# Patient Record
Sex: MALE | Race: WHITE | NOT HISPANIC OR LATINO
[De-identification: names, ages, dates, MRNs, and addresses within clinical notes are randomized per-mention and may not be internally consistent; named-entity substitution may affect disease eponyms.]

---

## 2017-02-03 ENCOUNTER — NON-APPOINTMENT (OUTPATIENT)
Age: 62
End: 2017-02-03

## 2017-02-03 ENCOUNTER — APPOINTMENT (OUTPATIENT)
Dept: FAMILY MEDICINE | Facility: CLINIC | Age: 62
End: 2017-02-03

## 2017-02-03 VITALS
OXYGEN SATURATION: 99 % | HEIGHT: 72 IN | BODY MASS INDEX: 28.71 KG/M2 | DIASTOLIC BLOOD PRESSURE: 74 MMHG | HEART RATE: 66 BPM | SYSTOLIC BLOOD PRESSURE: 120 MMHG | WEIGHT: 212 LBS

## 2017-02-03 DIAGNOSIS — Z87.898 PERSONAL HISTORY OF OTHER SPECIFIED CONDITIONS: ICD-10-CM

## 2017-02-03 DIAGNOSIS — Z13.220 ENCOUNTER FOR SCREENING FOR LIPOID DISORDERS: ICD-10-CM

## 2017-02-03 DIAGNOSIS — Z13.29 ENCOUNTER FOR SCREENING FOR OTHER SUSPECTED ENDOCRINE DISORDER: ICD-10-CM

## 2017-02-03 DIAGNOSIS — Z13.6 ENCOUNTER FOR SCREENING FOR CARDIOVASCULAR DISORDERS: ICD-10-CM

## 2017-02-03 DIAGNOSIS — T79.0XXA: ICD-10-CM

## 2017-02-03 DIAGNOSIS — Z13.1 ENCOUNTER FOR SCREENING FOR DIABETES MELLITUS: ICD-10-CM

## 2017-02-03 DIAGNOSIS — Z78.9 OTHER SPECIFIED HEALTH STATUS: ICD-10-CM

## 2017-02-05 PROBLEM — Z78.9 ALCOHOL INGESTION: Status: ACTIVE | Noted: 2017-02-05

## 2017-02-05 PROBLEM — T79.0XXA AIR EMBOLISM: Status: RESOLVED | Noted: 2017-02-05 | Resolved: 2017-02-05

## 2017-02-05 LAB
25(OH)D3 SERPL-MCNC: 6.5 NG/ML
ALBUMIN SERPL ELPH-MCNC: 3.8 G/DL
ALP BLD-CCNC: 108 U/L
ALT SERPL-CCNC: 8 U/L
ANION GAP SERPL CALC-SCNC: 15 MMOL/L
APPEARANCE: CLEAR
APTT BLD: 37.6 SEC
AST SERPL-CCNC: 15 U/L
BACTERIA: NEGATIVE
BASOPHILS # BLD AUTO: 0.02 K/UL
BASOPHILS NFR BLD AUTO: 0.3 %
BILIRUB SERPL-MCNC: 0.8 MG/DL
BILIRUBIN URINE: NEGATIVE
BLOOD URINE: NEGATIVE
BUN SERPL-MCNC: 12 MG/DL
CALCIUM SERPL-MCNC: 9.1 MG/DL
CHLORIDE SERPL-SCNC: 105 MMOL/L
CHOLEST SERPL-MCNC: 202 MG/DL
CHOLEST/HDLC SERPL: 3.7 RATIO
CO2 SERPL-SCNC: 24 MMOL/L
COLOR: YELLOW
CREAT SERPL-MCNC: 0.69 MG/DL
EOSINOPHIL # BLD AUTO: 0.23 K/UL
EOSINOPHIL NFR BLD AUTO: 3.9 %
GLUCOSE QUALITATIVE U: NORMAL MG/DL
GLUCOSE SERPL-MCNC: 91 MG/DL
HBA1C MFR BLD HPLC: 5.2 %
HCT VFR BLD CALC: 42.1 %
HDLC SERPL-MCNC: 54 MG/DL
HGB BLD-MCNC: 13.9 G/DL
IMM GRANULOCYTES NFR BLD AUTO: 0.2 %
INR PPP: 1.04 RATIO
KETONES URINE: NEGATIVE
LDLC SERPL CALC-MCNC: 132 MG/DL
LEUKOCYTE ESTERASE URINE: NEGATIVE
LYMPHOCYTES # BLD AUTO: 1.92 K/UL
LYMPHOCYTES NFR BLD AUTO: 32.9 %
MAN DIFF?: NORMAL
MCHC RBC-ENTMCNC: 32.6 PG
MCHC RBC-ENTMCNC: 33 GM/DL
MCV RBC AUTO: 98.8 FL
MICROSCOPIC-UA: NORMAL
MONOCYTES # BLD AUTO: 0.65 K/UL
MONOCYTES NFR BLD AUTO: 11.1 %
NEUTROPHILS # BLD AUTO: 3.01 K/UL
NEUTROPHILS NFR BLD AUTO: 51.6 %
NITRITE URINE: NEGATIVE
PH URINE: 6
PLATELET # BLD AUTO: 142 K/UL
POTASSIUM SERPL-SCNC: 4.4 MMOL/L
PROT SERPL-MCNC: 6.9 G/DL
PROTEIN URINE: NEGATIVE MG/DL
PT BLD: 11.7 SEC
RBC # BLD: 4.26 M/UL
RBC # FLD: 14.4 %
RED BLOOD CELLS URINE: 2 /HPF
SODIUM SERPL-SCNC: 144 MMOL/L
SPECIFIC GRAVITY URINE: 1.03
SQUAMOUS EPITHELIAL CELLS: 0 /HPF
TRIGL SERPL-MCNC: 80 MG/DL
TSH SERPL-ACNC: 1.62 UIU/ML
URATE SERPL-MCNC: 5.6 MG/DL
UROBILINOGEN URINE: NORMAL MG/DL
WBC # FLD AUTO: 5.84 K/UL
WHITE BLOOD CELLS URINE: 1 /HPF

## 2017-02-06 PROBLEM — F17.200 CURRENT EVERY DAY SMOKER: Status: ACTIVE | Noted: 2017-02-03

## 2017-02-09 ENCOUNTER — APPOINTMENT (OUTPATIENT)
Dept: CARDIOLOGY | Facility: CLINIC | Age: 62
End: 2017-02-09

## 2017-02-09 DIAGNOSIS — F17.200 NICOTINE DEPENDENCE, UNSPECIFIED, UNCOMPLICATED: ICD-10-CM

## 2017-02-21 ENCOUNTER — NON-APPOINTMENT (OUTPATIENT)
Age: 62
End: 2017-02-21

## 2017-02-21 ENCOUNTER — APPOINTMENT (OUTPATIENT)
Dept: CARDIOLOGY | Facility: CLINIC | Age: 62
End: 2017-02-21

## 2017-02-21 VITALS
SYSTOLIC BLOOD PRESSURE: 124 MMHG | WEIGHT: 213 LBS | BODY MASS INDEX: 28.85 KG/M2 | HEART RATE: 75 BPM | HEIGHT: 72 IN | OXYGEN SATURATION: 98 % | DIASTOLIC BLOOD PRESSURE: 87 MMHG

## 2017-03-01 ENCOUNTER — RESULT CHARGE (OUTPATIENT)
Age: 62
End: 2017-03-01

## 2017-03-09 ENCOUNTER — APPOINTMENT (OUTPATIENT)
Dept: CARDIOLOGY | Facility: CLINIC | Age: 62
End: 2017-03-09

## 2017-03-17 ENCOUNTER — OUTPATIENT (OUTPATIENT)
Dept: OUTPATIENT SERVICES | Facility: HOSPITAL | Age: 62
LOS: 1 days | End: 2017-03-17
Payer: MEDICARE

## 2017-03-17 DIAGNOSIS — I48.91 UNSPECIFIED ATRIAL FIBRILLATION: ICD-10-CM

## 2017-03-17 PROCEDURE — 93306 TTE W/DOPPLER COMPLETE: CPT

## 2017-03-17 PROCEDURE — 93306 TTE W/DOPPLER COMPLETE: CPT | Mod: 26

## 2017-03-20 DIAGNOSIS — I48.91 UNSPECIFIED ATRIAL FIBRILLATION: ICD-10-CM

## 2017-04-20 ENCOUNTER — NON-APPOINTMENT (OUTPATIENT)
Age: 62
End: 2017-04-20

## 2017-04-20 ENCOUNTER — APPOINTMENT (OUTPATIENT)
Dept: ELECTROPHYSIOLOGY | Facility: CLINIC | Age: 62
End: 2017-04-20

## 2017-04-20 VITALS
OXYGEN SATURATION: 99 % | HEIGHT: 72 IN | WEIGHT: 208 LBS | DIASTOLIC BLOOD PRESSURE: 82 MMHG | SYSTOLIC BLOOD PRESSURE: 142 MMHG | HEART RATE: 80 BPM | BODY MASS INDEX: 28.17 KG/M2

## 2017-04-20 RX ORDER — CARVEDILOL 3.12 MG/1
3.12 TABLET, FILM COATED ORAL TWICE DAILY
Qty: 60 | Refills: 1 | Status: DISCONTINUED | COMMUNITY
Start: 2017-02-03 | End: 2017-04-20

## 2017-05-18 ENCOUNTER — APPOINTMENT (OUTPATIENT)
Dept: ELECTROPHYSIOLOGY | Facility: CLINIC | Age: 62
End: 2017-05-18

## 2017-05-18 VITALS
DIASTOLIC BLOOD PRESSURE: 77 MMHG | OXYGEN SATURATION: 93 % | HEART RATE: 105 BPM | BODY MASS INDEX: 30.07 KG/M2 | WEIGHT: 222 LBS | SYSTOLIC BLOOD PRESSURE: 132 MMHG | HEIGHT: 72 IN

## 2017-05-18 RX ORDER — WARFARIN 5 MG/1
5 TABLET ORAL
Qty: 90 | Refills: 3 | Status: DISCONTINUED | COMMUNITY
Start: 2017-02-21 | End: 2017-05-18

## 2017-05-25 ENCOUNTER — APPOINTMENT (OUTPATIENT)
Dept: FAMILY MEDICINE | Facility: CLINIC | Age: 62
End: 2017-05-25

## 2017-05-26 ENCOUNTER — APPOINTMENT (OUTPATIENT)
Dept: FAMILY MEDICINE | Facility: CLINIC | Age: 62
End: 2017-05-26

## 2017-05-26 VITALS
BODY MASS INDEX: 30.34 KG/M2 | WEIGHT: 224 LBS | SYSTOLIC BLOOD PRESSURE: 130 MMHG | HEART RATE: 80 BPM | HEIGHT: 72 IN | OXYGEN SATURATION: 94 % | DIASTOLIC BLOOD PRESSURE: 82 MMHG

## 2017-05-30 LAB
25(OH)D3 SERPL-MCNC: 33.8 NG/ML
ALBUMIN SERPL ELPH-MCNC: 3.5 G/DL
ALP BLD-CCNC: 124 U/L
ALT SERPL-CCNC: 10 U/L
ANION GAP SERPL CALC-SCNC: 16 MMOL/L
AST SERPL-CCNC: 16 U/L
BASOPHILS # BLD AUTO: 0.03 K/UL
BASOPHILS NFR BLD AUTO: 0.3 %
BILIRUB SERPL-MCNC: 0.3 MG/DL
BUN SERPL-MCNC: 12 MG/DL
CALCIUM SERPL-MCNC: 9.2 MG/DL
CHLORIDE SERPL-SCNC: 100 MMOL/L
CO2 SERPL-SCNC: 27 MMOL/L
CREAT SERPL-MCNC: 0.73 MG/DL
EOSINOPHIL # BLD AUTO: 0.15 K/UL
EOSINOPHIL NFR BLD AUTO: 1.7 %
GLUCOSE SERPL-MCNC: 110 MG/DL
HCT VFR BLD CALC: 40.9 %
HGB BLD-MCNC: 12.8 G/DL
IMM GRANULOCYTES NFR BLD AUTO: 0.6 %
LYMPHOCYTES # BLD AUTO: 1.55 K/UL
LYMPHOCYTES NFR BLD AUTO: 17.3 %
MAN DIFF?: NORMAL
MCHC RBC-ENTMCNC: 31.3 GM/DL
MCHC RBC-ENTMCNC: 31.6 PG
MCV RBC AUTO: 101 FL
MONOCYTES # BLD AUTO: 0.73 K/UL
MONOCYTES NFR BLD AUTO: 8.1 %
NEUTROPHILS # BLD AUTO: 6.46 K/UL
NEUTROPHILS NFR BLD AUTO: 72 %
PLATELET # BLD AUTO: 321 K/UL
POTASSIUM SERPL-SCNC: 4.8 MMOL/L
PROT SERPL-MCNC: 6.8 G/DL
RBC # BLD: 4.05 M/UL
RBC # FLD: 14.1 %
SODIUM SERPL-SCNC: 143 MMOL/L
WBC # FLD AUTO: 8.97 K/UL

## 2017-06-01 RX ORDER — DIGOXIN 125 UG/1
125 TABLET ORAL
Qty: 30 | Refills: 1 | Status: DISCONTINUED | COMMUNITY
Start: 2017-02-03 | End: 2017-05-18

## 2017-06-05 ENCOUNTER — OTHER (OUTPATIENT)
Age: 62
End: 2017-06-05

## 2017-07-05 ENCOUNTER — RX RENEWAL (OUTPATIENT)
Age: 62
End: 2017-07-05

## 2017-08-17 ENCOUNTER — INPATIENT (INPATIENT)
Facility: HOSPITAL | Age: 62
LOS: 5 days | Discharge: HOME HEALTH | End: 2017-08-23
Attending: INTERNAL MEDICINE | Admitting: INTERNAL MEDICINE
Payer: MEDICARE

## 2017-08-17 ENCOUNTER — APPOINTMENT (OUTPATIENT)
Dept: ELECTROPHYSIOLOGY | Facility: CLINIC | Age: 62
End: 2017-08-17
Payer: MEDICARE

## 2017-08-17 VITALS — DIASTOLIC BLOOD PRESSURE: 75 MMHG | SYSTOLIC BLOOD PRESSURE: 136 MMHG

## 2017-08-17 VITALS
HEART RATE: 110 BPM | WEIGHT: 250 LBS | HEIGHT: 72 IN | BODY MASS INDEX: 33.86 KG/M2 | OXYGEN SATURATION: 90 % | SYSTOLIC BLOOD PRESSURE: 153 MMHG | DIASTOLIC BLOOD PRESSURE: 81 MMHG

## 2017-08-17 VITALS
DIASTOLIC BLOOD PRESSURE: 93 MMHG | OXYGEN SATURATION: 91 % | RESPIRATION RATE: 21 BRPM | TEMPERATURE: 98 F | HEART RATE: 106 BPM | SYSTOLIC BLOOD PRESSURE: 148 MMHG

## 2017-08-17 DIAGNOSIS — Z89.512 ACQUIRED ABSENCE OF LEFT LEG BELOW KNEE: Chronic | ICD-10-CM

## 2017-08-17 LAB
ALBUMIN SERPL ELPH-MCNC: 2.6 G/DL — LOW (ref 3.3–5)
ALP SERPL-CCNC: 146 U/L — HIGH (ref 40–120)
ALT FLD-CCNC: 19 U/L — SIGNIFICANT CHANGE UP (ref 12–78)
ANION GAP SERPL CALC-SCNC: 4 MMOL/L — LOW (ref 5–17)
APTT BLD: 34.4 SEC — SIGNIFICANT CHANGE UP (ref 27.5–37.4)
AST SERPL-CCNC: 19 U/L — SIGNIFICANT CHANGE UP (ref 15–37)
BASOPHILS # BLD AUTO: 0.1 K/UL — SIGNIFICANT CHANGE UP (ref 0–0.2)
BASOPHILS NFR BLD AUTO: 2 % — SIGNIFICANT CHANGE UP (ref 0–2)
BILIRUB SERPL-MCNC: 0.9 MG/DL — SIGNIFICANT CHANGE UP (ref 0.2–1.2)
BUN SERPL-MCNC: 12 MG/DL — SIGNIFICANT CHANGE UP (ref 7–23)
CALCIUM SERPL-MCNC: 8.5 MG/DL — SIGNIFICANT CHANGE UP (ref 8.5–10.1)
CHLORIDE SERPL-SCNC: 105 MMOL/L — SIGNIFICANT CHANGE UP (ref 96–108)
CK SERPL-CCNC: 46 U/L — SIGNIFICANT CHANGE UP (ref 26–308)
CO2 SERPL-SCNC: 35 MMOL/L — HIGH (ref 22–31)
CREAT SERPL-MCNC: 0.82 MG/DL — SIGNIFICANT CHANGE UP (ref 0.5–1.3)
EOSINOPHIL # BLD AUTO: 0.1 K/UL — SIGNIFICANT CHANGE UP (ref 0–0.5)
EOSINOPHIL NFR BLD AUTO: 1.6 % — SIGNIFICANT CHANGE UP (ref 0–6)
ETHANOL SERPL-MCNC: <10 MG/DL — SIGNIFICANT CHANGE UP (ref 0–10)
GLUCOSE SERPL-MCNC: 112 MG/DL — HIGH (ref 70–99)
HCT VFR BLD CALC: 39.9 % — SIGNIFICANT CHANGE UP (ref 39–50)
HGB BLD-MCNC: 12.8 G/DL — LOW (ref 13–17)
INR BLD: 1.33 RATIO — HIGH (ref 0.88–1.16)
LYMPHOCYTES # BLD AUTO: 1 K/UL — SIGNIFICANT CHANGE UP (ref 1–3.3)
LYMPHOCYTES # BLD AUTO: 14.2 % — SIGNIFICANT CHANGE UP (ref 13–44)
MCHC RBC-ENTMCNC: 30.9 PG — SIGNIFICANT CHANGE UP (ref 27–34)
MCHC RBC-ENTMCNC: 32.1 GM/DL — SIGNIFICANT CHANGE UP (ref 32–36)
MCV RBC AUTO: 96.2 FL — SIGNIFICANT CHANGE UP (ref 80–100)
MONOCYTES # BLD AUTO: 0.8 K/UL — SIGNIFICANT CHANGE UP (ref 0–0.9)
MONOCYTES NFR BLD AUTO: 10.4 % — SIGNIFICANT CHANGE UP (ref 2–14)
NEUTROPHILS # BLD AUTO: 5.3 K/UL — SIGNIFICANT CHANGE UP (ref 1.8–7.4)
NEUTROPHILS NFR BLD AUTO: 71.8 % — SIGNIFICANT CHANGE UP (ref 43–77)
NT-PROBNP SERPL-SCNC: 4694 PG/ML — HIGH (ref 0–125)
PLATELET # BLD AUTO: 166 K/UL — SIGNIFICANT CHANGE UP (ref 150–400)
POTASSIUM SERPL-MCNC: 4.2 MMOL/L — SIGNIFICANT CHANGE UP (ref 3.5–5.3)
POTASSIUM SERPL-SCNC: 4.2 MMOL/L — SIGNIFICANT CHANGE UP (ref 3.5–5.3)
PROT SERPL-MCNC: 5.8 GM/DL — LOW (ref 6–8.3)
PROTHROM AB SERPL-ACNC: 14.4 SEC — HIGH (ref 9.8–12.7)
RBC # BLD: 4.15 M/UL — LOW (ref 4.2–5.8)
RBC # FLD: 15.6 % — HIGH (ref 10.3–14.5)
SODIUM SERPL-SCNC: 144 MMOL/L — SIGNIFICANT CHANGE UP (ref 135–145)
TROPONIN I SERPL-MCNC: 0.02 NG/ML — SIGNIFICANT CHANGE UP (ref 0.01–0.04)
WBC # BLD: 7.3 K/UL — SIGNIFICANT CHANGE UP (ref 3.8–10.5)
WBC # FLD AUTO: 7.3 K/UL — SIGNIFICANT CHANGE UP (ref 3.8–10.5)

## 2017-08-17 PROCEDURE — 93000 ELECTROCARDIOGRAM COMPLETE: CPT

## 2017-08-17 PROCEDURE — 99285 EMERGENCY DEPT VISIT HI MDM: CPT

## 2017-08-17 PROCEDURE — 93970 EXTREMITY STUDY: CPT | Mod: 26

## 2017-08-17 PROCEDURE — 99215 OFFICE O/P EST HI 40 MIN: CPT

## 2017-08-17 PROCEDURE — 93010 ELECTROCARDIOGRAM REPORT: CPT

## 2017-08-17 PROCEDURE — 71010: CPT | Mod: 26

## 2017-08-17 RX ORDER — FUROSEMIDE 40 MG
40 TABLET ORAL ONCE
Qty: 0 | Refills: 0 | Status: COMPLETED | OUTPATIENT
Start: 2017-08-17 | End: 2017-08-17

## 2017-08-17 RX ORDER — DOCUSATE SODIUM 100 MG
100 CAPSULE ORAL THREE TIMES A DAY
Qty: 0 | Refills: 0 | Status: DISCONTINUED | OUTPATIENT
Start: 2017-08-17 | End: 2017-08-23

## 2017-08-17 RX ORDER — ASPIRIN/CALCIUM CARB/MAGNESIUM 324 MG
81 TABLET ORAL DAILY
Qty: 0 | Refills: 0 | Status: DISCONTINUED | OUTPATIENT
Start: 2017-08-18 | End: 2017-08-23

## 2017-08-17 RX ORDER — THIAMINE MONONITRATE (VIT B1) 100 MG
100 TABLET ORAL DAILY
Qty: 0 | Refills: 0 | Status: COMPLETED | OUTPATIENT
Start: 2017-08-17 | End: 2017-08-20

## 2017-08-17 RX ORDER — SODIUM CHLORIDE 9 MG/ML
3 INJECTION INTRAMUSCULAR; INTRAVENOUS; SUBCUTANEOUS ONCE
Qty: 0 | Refills: 0 | Status: COMPLETED | OUTPATIENT
Start: 2017-08-17 | End: 2017-08-17

## 2017-08-17 RX ORDER — ACETAMINOPHEN 500 MG
650 TABLET ORAL EVERY 6 HOURS
Qty: 0 | Refills: 0 | Status: DISCONTINUED | OUTPATIENT
Start: 2017-08-17 | End: 2017-08-23

## 2017-08-17 RX ORDER — AMOXICILLIN 250 MG/5ML
1000 SUSPENSION, RECONSTITUTED, ORAL (ML) ORAL ONCE
Qty: 0 | Refills: 0 | Status: COMPLETED | OUTPATIENT
Start: 2017-08-17 | End: 2017-08-17

## 2017-08-17 RX ORDER — FUROSEMIDE 40 MG
40 TABLET ORAL EVERY 12 HOURS
Qty: 0 | Refills: 0 | Status: COMPLETED | OUTPATIENT
Start: 2017-08-17 | End: 2017-08-18

## 2017-08-17 RX ORDER — METOPROLOL TARTRATE 50 MG
5 TABLET ORAL ONCE
Qty: 0 | Refills: 0 | Status: DISCONTINUED | OUTPATIENT
Start: 2017-08-17 | End: 2017-08-17

## 2017-08-17 RX ORDER — METOPROLOL TARTRATE 50 MG
5 TABLET ORAL ONCE
Qty: 0 | Refills: 0 | Status: COMPLETED | OUTPATIENT
Start: 2017-08-17 | End: 2017-08-17

## 2017-08-17 RX ORDER — SENNA PLUS 8.6 MG/1
2 TABLET ORAL AT BEDTIME
Qty: 0 | Refills: 0 | Status: DISCONTINUED | OUTPATIENT
Start: 2017-08-17 | End: 2017-08-23

## 2017-08-17 RX ORDER — NICOTINE POLACRILEX 2 MG
1 GUM BUCCAL DAILY
Qty: 0 | Refills: 0 | Status: DISCONTINUED | OUTPATIENT
Start: 2017-08-17 | End: 2017-08-23

## 2017-08-17 RX ORDER — METOPROLOL TARTRATE 50 MG
200 TABLET ORAL DAILY
Qty: 0 | Refills: 0 | Status: DISCONTINUED | OUTPATIENT
Start: 2017-08-18 | End: 2017-08-23

## 2017-08-17 RX ORDER — FOLIC ACID 0.8 MG
1 TABLET ORAL DAILY
Qty: 0 | Refills: 0 | Status: DISCONTINUED | OUTPATIENT
Start: 2017-08-17 | End: 2017-08-23

## 2017-08-17 RX ADMIN — Medication 650 MILLIGRAM(S): at 22:28

## 2017-08-17 RX ADMIN — Medication 40 MILLIGRAM(S): at 18:55

## 2017-08-17 RX ADMIN — Medication 5 MILLIGRAM(S): at 19:10

## 2017-08-17 RX ADMIN — Medication 325 MILLIGRAM(S): at 22:25

## 2017-08-17 RX ADMIN — Medication 1000 MILLIGRAM(S): at 19:10

## 2017-08-17 RX ADMIN — SODIUM CHLORIDE 3 MILLILITER(S): 9 INJECTION INTRAMUSCULAR; INTRAVENOUS; SUBCUTANEOUS at 16:56

## 2017-08-17 NOTE — H&P ADULT - ASSESSMENT
61 y/o M PMHx significant for Afib, chronic EtOH use, (+)smoker, s/p Lt BKA, who was referred to the     ED for further evaluation progressive shortness of breath associated w/ anarsarca -> lower extremity     edema and groin swelling. In the ED the patient was found to be hypoxic SaO2 91% on RA -> improved to     97% w/ 2L/min via NC, and in AFib w/ RVR. The patient was given Lasix 40mg IV x 1, and Metoprolol 5mg     IV x 1.      1)Peripheral Edema/Anarsarca; r/o CHF   ~admit to Telemetry  ~serial Priscilla/EKGs  ~f/u w/ Cardiology  ~cont. IV diuresis  ~strict I/Os  ~daily wts.  ~B/L LE Dopplers (-) for DVT    2)AFib w/ RVR  ~cont. ASA 81mg po daily  ~patients' [id number] score is 1    3)EtOH abuse  ~last drink was 5 days ago  ~cont. to monitor    4)Vte ppx  ~cont. Heparin sq 61 y/o M PMHx significant for Afib, chronic EtOH use, (+)smoker, s/p Lt BKA, who was referred to the ED for further evaluation progressive shortness of breath associated w/ anarsarca -> lower extremity edema and groin swelling. In the ED the patient was found to be hypoxic SaO2 91% on RA -> improved to 97% w/ 2L/min via NC, and in AFib w/ RVR. The patient was given Lasix 40mg IV x 1, and Metoprolol 5mg IV x 1.      1)Peripheral Edema/Anarsarca; r/o CHF   ~admit to Telemetry  ~serial Priscilla/EKGs  ~f/u w/ Cardiology  ~cont. IV diuresis  ~strict I/Os  ~daily wts.  ~B/L LE Dopplers (-) for DVT    2)AFib w/ RVR  ~cont. ASA 81mg po daily  ~patients' [id number] score is 1 (per family pt was taken off A/C 2/2 his extensive hx of EtOH abuse)  ~cont. rate control    3)EtOH abuse  ~per family the patient has a longstanding hx for EtOH abuse with prior hospitalizations w/ DTs  ~will start EtOH withdrawal protocol  ~seizure precautions  ~fall precautions    4)Vte ppx  ~cont. Heparin sq

## 2017-08-17 NOTE — ED ADULT TRIAGE NOTE - CHIEF COMPLAINT QUOTE
pt brought by family for eval of leg groin swelling and SOB. pt brought by family for eval of leg groin swelling and SOB. pt w/ left lower leg amputation

## 2017-08-17 NOTE — H&P ADULT - HISTORY OF PRESENT ILLNESS
61 y/o M PMHx significant for Afib, chronic EtOH use, (+)smoker, s/p Lt BKA, who was referred to the ED for further evaluation progressive shortness of breath associated w/ anarsarca -> lower extremity edema and groin swelling. In the ED the patient was found to be hypoxic SaO2 91% on RA -> improved to 97% w/ 2L/min via NC, and in AFib w/ RVR. The patient was given Lasix 40mg IV x 1, and Metoprolol 5mg IV x 1.

## 2017-08-17 NOTE — ED PROVIDER NOTE - MEDICAL DECISION MAKING DETAILS
63 y/o M s/p sob, left LE edema, to obtain labs, cxr, reassess. 63 y/o M s/p sob, LE edema, a.fib w/ RVR.   Swelling and SOB secondary to fluid overload from a.fib w/ RVR, CHF, and possibly liver failure given hx of EtO use. LE dopplers.  CXR, labs, cardiac workup.  IV lasix, continue BBs to lower HR.  Pt on Metoprolol.

## 2017-08-17 NOTE — H&P ADULT - NSHPPHYSICALEXAM_GEN_ALL_CORE
Vital Signs Last 24 Hrs  T(C): 37.3 (17 Aug 2017 19:16), Max: 37.3 (17 Aug 2017 19:16)  T(F): 99.1 (17 Aug 2017 19:16), Max: 99.1 (17 Aug 2017 19:16)  HR: 104 (17 Aug 2017 19:16) (104 - 106)  BP: 137/91 (17 Aug 2017 19:16) (137/91 - 148/93)  BP(mean): 104 (17 Aug 2017 15:58) (104 - 104)  RR: 20 (17 Aug 2017 19:16) (20 - 21)  SpO2: 96% (17 Aug 2017 19:16) (91% - 97%)

## 2017-08-17 NOTE — ED PROVIDER NOTE - OBJECTIVE STATEMENT
63 y/o M PMHx Afib, chronic etoh use, smokes, BKA after an accident, PMD Dr. Yang, presents to the ED s/p sob. The pt provides that he has been having SOB since the past 1 day, associated with left leg swelling since the past few days but has swelling over his testicles as well. The pt notes that he doesn't sleep on the bed and feels more comfortable when he sleeps upright on the couch. No h/o headache, fever, chills, dizziness, abd pain, nvd, cp, or urinary incontinence.

## 2017-08-17 NOTE — ED PROVIDER NOTE - PROGRESS NOTE DETAILS
HR improved to 105 w/ 5mg IV metoprolol.  Will give 5mg more.  Pt to be admitted.  IV lasix given.  Discussed results with patient.  Breathing comfortably.  Needs further diuresis. S/w Hospitalist who has evaluated the patient. Will send off VBG and Ammonia level. HR improved to 105 w/ 5mg IV metoprolol.  Will give 5mg more.  Pt to be admitted.  IV lasix given.  Discussed results with patient.  Breathing comfortably.  Needs further diuresis.  Has redness to skin in lower abd and upper legs.  Allergic to Ceclor, does not know allergy, will cover w/ amoxicillin PO for possible cellulitis.  No fever.

## 2017-08-17 NOTE — ED ADULT NURSE NOTE - OBJECTIVE STATEMENT
presents to the ED sent from cardiologist for SOB and swelling in lower extremities. daughter at bedside states that the pt has no hx of CHF but was once on a diaeretic but is no longer taking. also a current everyday smoker; ETOH use daily, last drink 5 days ago. noted to have swelling to b/l LE, wheezing. placed on 2L NC, cardiac monitor. EKG done. IVL placed and labs drawn.

## 2017-08-17 NOTE — ED PROVIDER NOTE - CHPI ED SYMPTOMS NEG
no dizziness/no syncope/no nausea/no cough/no fever/no chills/no diaphoresis/no back pain/no chest pain

## 2017-08-18 ENCOUNTER — APPOINTMENT (OUTPATIENT)
Dept: FAMILY MEDICINE | Facility: CLINIC | Age: 62
End: 2017-08-18

## 2017-08-18 DIAGNOSIS — F10.10 ALCOHOL ABUSE, UNCOMPLICATED: ICD-10-CM

## 2017-08-18 DIAGNOSIS — I48.2 CHRONIC ATRIAL FIBRILLATION: ICD-10-CM

## 2017-08-18 DIAGNOSIS — R60.1 GENERALIZED EDEMA: ICD-10-CM

## 2017-08-18 LAB
ALBUMIN SERPL ELPH-MCNC: 2.5 G/DL — LOW (ref 3.3–5)
ALP SERPL-CCNC: 147 U/L — HIGH (ref 40–120)
ALT FLD-CCNC: 16 U/L — SIGNIFICANT CHANGE UP (ref 12–78)
AMMONIA BLD-MCNC: 87 UMOL/L — HIGH (ref 11–32)
ANION GAP SERPL CALC-SCNC: 5 MMOL/L — SIGNIFICANT CHANGE UP (ref 5–17)
APTT BLD: 35.6 SEC — SIGNIFICANT CHANGE UP (ref 27.5–37.4)
AST SERPL-CCNC: 17 U/L — SIGNIFICANT CHANGE UP (ref 15–37)
BASE EXCESS BLDV CALC-SCNC: 7 MMOL/L — HIGH (ref -2–2)
BASOPHILS # BLD AUTO: 0.2 K/UL — SIGNIFICANT CHANGE UP (ref 0–0.2)
BASOPHILS NFR BLD AUTO: 2.9 % — HIGH (ref 0–2)
BILIRUB SERPL-MCNC: 0.7 MG/DL — SIGNIFICANT CHANGE UP (ref 0.2–1.2)
BUN SERPL-MCNC: 12 MG/DL — SIGNIFICANT CHANGE UP (ref 7–23)
CALCIUM SERPL-MCNC: 8.2 MG/DL — LOW (ref 8.5–10.1)
CHLORIDE SERPL-SCNC: 104 MMOL/L — SIGNIFICANT CHANGE UP (ref 96–108)
CO2 SERPL-SCNC: 32 MMOL/L — HIGH (ref 22–31)
CREAT SERPL-MCNC: 0.72 MG/DL — SIGNIFICANT CHANGE UP (ref 0.5–1.3)
EOSINOPHIL # BLD AUTO: 0.1 K/UL — SIGNIFICANT CHANGE UP (ref 0–0.5)
EOSINOPHIL NFR BLD AUTO: 2.1 % — SIGNIFICANT CHANGE UP (ref 0–6)
GLUCOSE SERPL-MCNC: 94 MG/DL — SIGNIFICANT CHANGE UP (ref 70–99)
HAV IGM SER-ACNC: SIGNIFICANT CHANGE UP
HBV CORE IGM SER-ACNC: SIGNIFICANT CHANGE UP
HBV SURFACE AG SER-ACNC: SIGNIFICANT CHANGE UP
HCO3 BLDV-SCNC: 36 MMOL/L — HIGH (ref 21–29)
HCT VFR BLD CALC: 40.8 % — SIGNIFICANT CHANGE UP (ref 39–50)
HCV AB S/CO SERPL IA: 0.09 S/CO — SIGNIFICANT CHANGE UP
HCV AB SERPL-IMP: SIGNIFICANT CHANGE UP
HGB BLD-MCNC: 12.6 G/DL — LOW (ref 13–17)
INR BLD: 1.33 RATIO — HIGH (ref 0.88–1.16)
LYMPHOCYTES # BLD AUTO: 0.8 K/UL — LOW (ref 1–3.3)
LYMPHOCYTES # BLD AUTO: 14.1 % — SIGNIFICANT CHANGE UP (ref 13–44)
MAGNESIUM SERPL-MCNC: 1.5 MG/DL — LOW (ref 1.6–2.6)
MCHC RBC-ENTMCNC: 30.5 PG — SIGNIFICANT CHANGE UP (ref 27–34)
MCHC RBC-ENTMCNC: 31 GM/DL — LOW (ref 32–36)
MCV RBC AUTO: 98.5 FL — SIGNIFICANT CHANGE UP (ref 80–100)
MONOCYTES # BLD AUTO: 0.5 K/UL — SIGNIFICANT CHANGE UP (ref 0–0.9)
MONOCYTES NFR BLD AUTO: 9 % — SIGNIFICANT CHANGE UP (ref 2–14)
NEUTROPHILS # BLD AUTO: 4.3 K/UL — SIGNIFICANT CHANGE UP (ref 1.8–7.4)
NEUTROPHILS NFR BLD AUTO: 71.8 % — SIGNIFICANT CHANGE UP (ref 43–77)
PCO2 BLDV: 77 MMHG — HIGH (ref 35–50)
PH BLDV: 7.29 — LOW (ref 7.35–7.45)
PLATELET # BLD AUTO: 185 K/UL — SIGNIFICANT CHANGE UP (ref 150–400)
PO2 BLDV: 164 MMHG — HIGH (ref 25–45)
POTASSIUM SERPL-MCNC: 4.2 MMOL/L — SIGNIFICANT CHANGE UP (ref 3.5–5.3)
POTASSIUM SERPL-SCNC: 4.2 MMOL/L — SIGNIFICANT CHANGE UP (ref 3.5–5.3)
PROT SERPL-MCNC: 6.1 GM/DL — SIGNIFICANT CHANGE UP (ref 6–8.3)
PROTHROM AB SERPL-ACNC: 14.5 SEC — HIGH (ref 9.8–12.7)
RBC # BLD: 4.14 M/UL — LOW (ref 4.2–5.8)
RBC # FLD: 16.1 % — HIGH (ref 10.3–14.5)
SAO2 % BLDV: 99 % — HIGH (ref 67–88)
SODIUM SERPL-SCNC: 141 MMOL/L — SIGNIFICANT CHANGE UP (ref 135–145)
TSH SERPL-MCNC: 1.26 UIU/ML — SIGNIFICANT CHANGE UP (ref 0.36–3.74)
WBC # BLD: 5.9 K/UL — SIGNIFICANT CHANGE UP (ref 3.8–10.5)
WBC # FLD AUTO: 5.9 K/UL — SIGNIFICANT CHANGE UP (ref 3.8–10.5)

## 2017-08-18 PROCEDURE — 93306 TTE W/DOPPLER COMPLETE: CPT | Mod: 26

## 2017-08-18 PROCEDURE — 99223 1ST HOSP IP/OBS HIGH 75: CPT

## 2017-08-18 PROCEDURE — 71250 CT THORAX DX C-: CPT | Mod: 26

## 2017-08-18 RX ORDER — LACTULOSE 10 G/15ML
20 SOLUTION ORAL THREE TIMES A DAY
Qty: 0 | Refills: 0 | Status: DISCONTINUED | OUTPATIENT
Start: 2017-08-18 | End: 2017-08-19

## 2017-08-18 RX ORDER — ALBUTEROL 90 UG/1
2.5 AEROSOL, METERED ORAL EVERY 6 HOURS
Qty: 0 | Refills: 0 | Status: DISCONTINUED | OUTPATIENT
Start: 2017-08-18 | End: 2017-08-23

## 2017-08-18 RX ORDER — MAGNESIUM OXIDE 400 MG ORAL TABLET 241.3 MG
400 TABLET ORAL
Qty: 0 | Refills: 0 | Status: COMPLETED | OUTPATIENT
Start: 2017-08-18 | End: 2017-08-21

## 2017-08-18 RX ORDER — ALBUTEROL 90 UG/1
2.5 AEROSOL, METERED ORAL
Qty: 0 | Refills: 0 | Status: DISCONTINUED | OUTPATIENT
Start: 2017-08-18 | End: 2017-08-23

## 2017-08-18 RX ORDER — FUROSEMIDE 40 MG
40 TABLET ORAL
Qty: 0 | Refills: 0 | Status: DISCONTINUED | OUTPATIENT
Start: 2017-08-18 | End: 2017-08-21

## 2017-08-18 RX ADMIN — LACTULOSE 20 GRAM(S): 10 SOLUTION ORAL at 20:16

## 2017-08-18 RX ADMIN — ALBUTEROL 2.5 MILLIGRAM(S): 90 AEROSOL, METERED ORAL at 13:57

## 2017-08-18 RX ADMIN — Medication 200 MILLIGRAM(S): at 05:24

## 2017-08-18 RX ADMIN — ALBUTEROL 2.5 MILLIGRAM(S): 90 AEROSOL, METERED ORAL at 19:57

## 2017-08-18 RX ADMIN — LACTULOSE 20 GRAM(S): 10 SOLUTION ORAL at 15:05

## 2017-08-18 RX ADMIN — Medication 40 MILLIGRAM(S): at 13:56

## 2017-08-18 RX ADMIN — Medication 40 MILLIGRAM(S): at 05:24

## 2017-08-18 RX ADMIN — Medication 2 MILLIGRAM(S): at 05:04

## 2017-08-18 RX ADMIN — MAGNESIUM OXIDE 400 MG ORAL TABLET 400 MILLIGRAM(S): 241.3 TABLET ORAL at 17:26

## 2017-08-18 RX ADMIN — Medication 1 MILLIGRAM(S): at 01:34

## 2017-08-18 RX ADMIN — Medication 1 PATCH: at 12:24

## 2017-08-18 NOTE — PROVIDER CONTACT NOTE (OTHER) - ASSESSMENT
Pt difficult to arouse. Able to make eye contact but unable to verbalize needs. Restless. Pt has course lung sounds and O2 saturation drops to 80's. Pt receiving Ativan PO 2mg Q4H. Received 2 doses, last dose 5am.

## 2017-08-18 NOTE — PROGRESS NOTE ADULT - SUBJECTIVE AND OBJECTIVE BOX
CC- Patient is a 62y old  Male who presents with a chief complaint of Shortness of Breath (17 Aug 2017 20:05)    HPI:  63 y/o M PMHx significant for Afib, chronic EtOH use, (+)smoker, s/p Lt BKA, who was referred to the ED for further evaluation progressive shortness of breath associated w/ anarsarca -> lower extremity edema and groin swelling. In the ED the patient was found to be hypoxic SaO2 91% on RA -> improved to 97% w/ 2L/min via NC, and in AFib w/ RVR. The patient was given Lasix 40mg IV x 1, and Metoprolol 5mg IV x 1. (17 Aug 2017 20:05)    17 pt is drowsy  Review of system- UTO    T(C): 37.1 (17 @ 12:10), Max: 37.4 (17 @ 22:26)  HR: 118 (17 @ 12:10) (94 - 140)  BP: 110/87 (17 @ 12:10) (94/61 - 148/93)  RR: 20 (17 @ 12:10) (18 - 24)  SpO2: 98% (17 @ 12:10) (91% - 98%)  Wt(kg): --    LABS:                        12.6   5.9   )-----------( 185      ( 18 Aug 2017 06:43 )             40.8         141  |  104  |  12  ----------------------------<  94  4.2   |  32<H>  |  0.72    Ca    8.2<L>      18 Aug 2017 06:43  Mg     1.5         TPro  6.1  /  Alb  2.5<L>  /  TBili  0.7  /  DBili  x   /  AST  17  /  ALT  16  /  AlkPhos  147<H>    PT/INR - ( 18 Aug 2017 06:43 )   PT: 14.5 sec;   INR: 1.33 ratio     PTT - ( 18 Aug 2017 06:43 )  PTT:35.6 sec    CARDIAC MARKERS ( 17 Aug 2017 20:02 )  0.016 ng/mL / x     / x     / x     / x      CARDIAC MARKERS ( 17 Aug 2017 16:21 )  <0.015 ng/mL / x     / 46 U/L / x     / x        RADIOLOGY & ADDITIONAL TESTS:  EXAM:  CHEST SINGLE VIEW FRONTAL                        PROCEDURE DATE:  2017    INTERPRETATION:  Portable chest radiograph dated 2017.  COMPARISON: None available.    CLINICAL INFORMATION: Chest pain.  FINDINGS:    The airway is midline.  There are no airspace consolidations.  There is no pleural effusion or pneumothorax.   Cardiomegaly..   The bones are normal.     IMPRESSION:  Cardiomegaly but no evidence of active diseases in the lungs.    PHYSICAL EXAM:  GENERAL: ill-looking  HEAD:  Atraumatic, Normocephalic  EYES: EOMI, PERRLA, conjunctiva and sclera clear  HEENT: Moist mucous membranes  NECK: Supple, No JVD  NERVOUS SYSTEM:  Lethargic  CHEST/LUNG: B/l rhonchi and rales  HEART: Regular rate and rhythm; No murmurs, rubs, or gallops  ABDOMEN: Soft, Nontender, Nondistended; Bowel sounds present  GENITOURINARY- Voiding, no palpable bladder  EXTREMITIES:  RLE swelling, LT- BKA  MUSCULOSKELTAL- No muscle tenderness, Muscle tone normal, No joint tenderness, no Joint swelling, Joint range of motion-normal  SKIN-no rash, no lesion    Daily Height in cm: 187.96 (17 Aug 2017 21:57)    Daily Weight in k.7 (18 Aug 2017 12:10)    acetaminophen   Tablet 650 milliGRAM(s) Oral every 6 hours PRN  acetaminophen   Tablet. 650 milliGRAM(s) Oral every 6 hours PRN  senna 2 Tablet(s) Oral at bedtime PRN  docusate sodium 100 milliGRAM(s) Oral three times a day PRN  metoprolol succinate  milliGRAM(s) Oral daily  aspirin enteric coated 81 milliGRAM(s) Oral daily  folic acid 1 milliGRAM(s) Oral daily  multivitamin 1 Tablet(s) Oral daily  thiamine 100 milliGRAM(s) Oral daily  nicotine -  14 mG/24Hr(s) Patch 1 Patch Transdermal daily  furosemide   Injectable 40 milliGRAM(s) IV Push two times a day  ALBUTerol    0.083% 2.5 milliGRAM(s) Nebulizer every 6 hours  ALBUTerol    0.083% 2.5 milliGRAM(s) Nebulizer every 2 hours PRN  levoFLOXacin IVPB 500 milliGRAM(s) IV Intermittent once  levoFLOXacin IVPB   IV Intermittent   lactulose Syrup 20 Gram(s) Oral three times a day  magnesium oxide 400 milliGRAM(s) Oral two times a day with meals      Assessment/Plan  #CHF exacerbation  Card elsy Hein  IV Lasix BID  ECHO to estimate EF  Monitor lytes    #Encephalopathy likely metabolic  Elevated ammonia- start on lactulose  Elevated CO2- ?underlying COPD (chronic smoker)- Pulmonary eval DR Lane  Start on BiPAP  Monitor ammonia level  Abd yuo  DEMI MULLIGAN protocol- not in withdrawal    #COPD/chronic vs acute on chronic bronchitis  Start on IV Levaquin  Pulm and ID eval  Nebs  CT chest w/o contrast    #New onset afib  Cont BB for rate control  Cont aspirin  Card elsy Hein    #Hypomagnesemia- replete    #Dispo- as above

## 2017-08-18 NOTE — CONSULT NOTE ADULT - PROBLEM SELECTOR RECOMMENDATION 3
No evidence of acute CHF at this time. Right sided structures not seen well on echo. Normal LV function on Echo

## 2017-08-19 LAB
AMMONIA BLD-MCNC: 55 UMOL/L — HIGH (ref 11–32)
ANION GAP SERPL CALC-SCNC: 1 MMOL/L — LOW (ref 5–17)
BASE EXCESS BLDA CALC-SCNC: 11 MMOL/L — HIGH (ref -2–2)
BUN SERPL-MCNC: 11 MG/DL — SIGNIFICANT CHANGE UP (ref 7–23)
CALCIUM SERPL-MCNC: 8.5 MG/DL — SIGNIFICANT CHANGE UP (ref 8.5–10.1)
CHLORIDE SERPL-SCNC: 96 MMOL/L — SIGNIFICANT CHANGE UP (ref 96–108)
CO2 SERPL-SCNC: 42 MMOL/L — HIGH (ref 22–31)
CREAT SERPL-MCNC: 0.72 MG/DL — SIGNIFICANT CHANGE UP (ref 0.5–1.3)
GLUCOSE SERPL-MCNC: 105 MG/DL — HIGH (ref 70–99)
HCO3 BLDA-SCNC: 40 MMOL/L — HIGH (ref 21–29)
HCT VFR BLD CALC: 42.5 % — SIGNIFICANT CHANGE UP (ref 39–50)
HGB BLD-MCNC: 13.4 G/DL — SIGNIFICANT CHANGE UP (ref 13–17)
HOROWITZ INDEX BLDA+IHG-RTO: 38 — SIGNIFICANT CHANGE UP
MCHC RBC-ENTMCNC: 30.8 PG — SIGNIFICANT CHANGE UP (ref 27–34)
MCHC RBC-ENTMCNC: 31.5 GM/DL — LOW (ref 32–36)
MCV RBC AUTO: 97.7 FL — SIGNIFICANT CHANGE UP (ref 80–100)
PCO2 BLDA: 84 MMHG — CRITICAL HIGH (ref 32–46)
PH BLDA: 7.3 — LOW (ref 7.35–7.45)
PLATELET # BLD AUTO: 237 K/UL — SIGNIFICANT CHANGE UP (ref 150–400)
PO2 BLDA: 82 — SIGNIFICANT CHANGE UP
POTASSIUM SERPL-MCNC: 4.2 MMOL/L — SIGNIFICANT CHANGE UP (ref 3.5–5.3)
POTASSIUM SERPL-SCNC: 4.2 MMOL/L — SIGNIFICANT CHANGE UP (ref 3.5–5.3)
RBC # BLD: 4.35 M/UL — SIGNIFICANT CHANGE UP (ref 4.2–5.8)
RBC # FLD: 15.8 % — HIGH (ref 10.3–14.5)
SAO2 % BLDA: 95 — SIGNIFICANT CHANGE UP
SODIUM SERPL-SCNC: 139 MMOL/L — SIGNIFICANT CHANGE UP (ref 135–145)
WBC # BLD: 8.6 K/UL — SIGNIFICANT CHANGE UP (ref 3.8–10.5)
WBC # FLD AUTO: 8.6 K/UL — SIGNIFICANT CHANGE UP (ref 3.8–10.5)

## 2017-08-19 PROCEDURE — 99233 SBSQ HOSP IP/OBS HIGH 50: CPT

## 2017-08-19 RX ORDER — LACTULOSE 10 G/15ML
20 SOLUTION ORAL EVERY 6 HOURS
Qty: 0 | Refills: 0 | Status: DISCONTINUED | OUTPATIENT
Start: 2017-08-19 | End: 2017-08-23

## 2017-08-19 RX ADMIN — Medication 100 MILLIGRAM(S): at 11:44

## 2017-08-19 RX ADMIN — Medication 40 MILLIGRAM(S): at 18:48

## 2017-08-19 RX ADMIN — Medication 81 MILLIGRAM(S): at 11:44

## 2017-08-19 RX ADMIN — MAGNESIUM OXIDE 400 MG ORAL TABLET 400 MILLIGRAM(S): 241.3 TABLET ORAL at 05:27

## 2017-08-19 RX ADMIN — MAGNESIUM OXIDE 400 MG ORAL TABLET 400 MILLIGRAM(S): 241.3 TABLET ORAL at 18:48

## 2017-08-19 RX ADMIN — Medication 1 TABLET(S): at 11:44

## 2017-08-19 RX ADMIN — Medication 40 MILLIGRAM(S): at 05:27

## 2017-08-19 RX ADMIN — Medication 1 PATCH: at 11:44

## 2017-08-19 RX ADMIN — Medication 1 MILLIGRAM(S): at 11:44

## 2017-08-19 RX ADMIN — LACTULOSE 20 GRAM(S): 10 SOLUTION ORAL at 21:23

## 2017-08-19 RX ADMIN — LACTULOSE 20 GRAM(S): 10 SOLUTION ORAL at 14:05

## 2017-08-19 RX ADMIN — ALBUTEROL 2.5 MILLIGRAM(S): 90 AEROSOL, METERED ORAL at 20:48

## 2017-08-19 RX ADMIN — LACTULOSE 20 GRAM(S): 10 SOLUTION ORAL at 05:27

## 2017-08-19 RX ADMIN — ALBUTEROL 2.5 MILLIGRAM(S): 90 AEROSOL, METERED ORAL at 14:37

## 2017-08-19 RX ADMIN — Medication 200 MILLIGRAM(S): at 05:28

## 2017-08-19 NOTE — CONSULT NOTE ADULT - ASSESSMENT
61 y/o M PMHx significant for Afib, chronic EtOH use, (+)smoker, s/p Lt BKA, admitted 8'/17 with progressive shortness of breath associated w/ anarsarca -> lower extremity edema and groin swelling. In the ED the patient was found to be hypoxic SaO2 91% on RA -> improved to 97% w/ 2L/min via NC, and in AFib w/ RVR. Over hospital course treated for afib/chf with improvement, noted to have worsening coughing w/ sputum, CT chest with b/l effusions/cardiomegaly, no obvious pna, some prior lung scarring, atelectasis was given IV levaquin for bronchitis.    1. bronchitis vs atelectasis/copd/hypoxic resp failure/chf exacerbation/afib  - no evidence of obvious pna on imaging  - sxs likey d/t bronchitis  - improvement noted since yesterday with initiation of levaquin  - can continue with levaquin for now 500mg daily #2   - switch to po tomorrow if stable and complete short 5 day course total  - fu cbc  - receiving diuresis for chf  - cardio following   - monitor temps  -tolerating abx well so far; no side effects noted  -reason for abx use and side effects reviewed with patient

## 2017-08-19 NOTE — PROGRESS NOTE ADULT - SUBJECTIVE AND OBJECTIVE BOX
CC- Patient is a 62y old  Male who presents with a chief complaint of Shortness of Breath (17 Aug 2017 20:05)    HPI:  61 y/o M PMHx significant for Afib, chronic EtOH use, (+)smoker, s/p Lt BKA, who was referred to the ED for further evaluation progressive shortness of breath associated w/ anarsarca -> lower extremity edema and groin swelling. In the ED the patient was found to be hypoxic SaO2 91% on RA -> improved to 97% w/ 2L/min via NC, and in AFib w/ RVR. The patient was given Lasix 40mg IV x 1, and Metoprolol 5mg IV x 1. (17 Aug 2017 20:05)    17 pt is drowsy  17 more awake and alert, did not hold BiPAP. On NC but desats when takes off    Review of system- UTO    Vital Signs Last 24 Hrs  T(C): 36.2 (19 Aug 2017 10:15), Max: 37.1 (18 Aug 2017 16:52)  T(F): 97.1 (19 Aug 2017 10:15), Max: 98.8 (18 Aug 2017 16:52)  HR: 101 (19 Aug 2017 10:15) (74 - 106)  BP: 100/65 (19 Aug 2017 10:15) (100/65 - 119/51)  BP(mean): --  RR: 19 (19 Aug 2017 10:15) (19 - 20)  SpO2: 92% (19 Aug 2017 10:15) (92% - 95%)    LABS:                        13.4   8.6   )-----------( 237      ( 19 Aug 2017 06:04 )             42.5     19 Aug 2017 06:04    139    |  96     |  11     ----------------------------<  105    4.2     |  42     |  0.72     Ca    8.5        19 Aug 2017 06:04  Mg     1.5       18 Aug 2017 06:43    TPro  6.1    /  Alb  2.5    /  TBili  0.7    /  DBili  x      /  AST  17     /  ALT  16     /  AlkPhos  147    18 Aug 2017 06:43    LIVER FUNCTIONS - ( 18 Aug 2017 06:43 )  Alb: 2.5 g/dL / Pro: 6.1 gm/dL / ALK PHOS: 147 U/L / ALT: 16 U/L / AST: 17 U/L / GGT: x         PT/INR - ( 18 Aug 2017 06:43 )   PT: 14.5 sec;   INR: 1.33 ratio     PTT - ( 18 Aug 2017 06:43 )  PTT:35.6 sec    CARDIAC MARKERS ( 17 Aug 2017 20:02 )  0.016 ng/mL / x     / x     / x     / x      CARDIAC MARKERS ( 17 Aug 2017 16:21 )  <0.015 ng/mL / x     / 46 U/L / x     / x        RADIOLOGY & ADDITIONAL TESTS:  EXAM:  CHEST SINGLE VIEW FRONTAL                        PROCEDURE DATE:  2017    INTERPRETATION:  Portable chest radiograph dated 2017.  COMPARISON: None available.    CLINICAL INFORMATION: Chest pain.  FINDINGS:    The airway is midline.  There are no airspace consolidations.  There is no pleural effusion or pneumothorax.   Cardiomegaly..   The bones are normal.     IMPRESSION:  Cardiomegaly but no evidence of active diseases in the lungs.    EXAM:  CT CHEST                        PROCEDURE DATE:  2017    INTERPRETATION:  HISTORY: Shortness of breath. Evaluate for pneumonia.    TECHNIQUE: A non-contrast CT scan of the chest was performed from the   thoracic inlet to the adrenal glands.  Coronal and sagittal reformatted   images are provided.    COMPARISON: None available.    FINDINGS:   There is motion artifact present which degrades image quality.  Allowing for motion there is no evidence for pneumonia. No suspicious   pulmonary nodule or mass is identified. Linear scarring versus   atelectasis is seen in the right upper lobe. There is a small right   pleural effusion with minimal adjacent compressive atelectasis. A trace   left pleural effusion is identified.Questionable diffuse mild   interstitial thickening.  The trachea and main bronchi are clear. There is no pneumothorax.  The heart is enlarged. There is a trace pericardial effusion. The   thoracic aorta is normal in caliber. The main pulmonary artery is dilated   t  The thyroid gland is unremarkable.  There is no significant axillary, mediastinal, or hilar lymphadenopathy.  The visualized bones do not demonstrate abnormality.  Limited images of the upper abdomen demonstrate nonspecific partially   visualized perinephric stranding. Punctate calcifications scattered   throughout the spleen, likely the sequela prior granulomatous disease.    IMPRESSION:   Motion degraded exam.  No gross evidence for pneumonia.  Cardiomegaly with small right and trace left pleural effusions.   Questionable minimal pulmonary edema.      PHYSICAL EXAM:  GENERAL: ill-looking  HEAD:  Atraumatic, Normocephalic  EYES: EOMI, PERRLA, conjunctiva and sclera clear  HEENT: Moist mucous membranes  NECK: Supple, No JVD  NERVOUS SYSTEM:  Awake and responsive  CHEST/LUNG: B/l rhonchi better  HEART: Regular rate and rhythm; No murmurs, rubs, or gallops  ABDOMEN: Soft, Nontender, Nondistended; Bowel sounds present  GENITOURINARY- Voiding, no palpable bladder  EXTREMITIES:  RLE swelling improving, LT- BKA  MUSCULOSKELTAL- No muscle tenderness, Muscle tone normal, No joint tenderness, no Joint swelling, Joint range of motion-normal  SKIN-no rash, no lesion    Daily Height in cm: 187.96 (17 Aug 2017 21:57)    Daily Weight in k.7 (18 Aug 2017 12:10)    acetaminophen   Tablet 650 milliGRAM(s) Oral every 6 hours PRN  acetaminophen   Tablet. 650 milliGRAM(s) Oral every 6 hours PRN  senna 2 Tablet(s) Oral at bedtime PRN  docusate sodium 100 milliGRAM(s) Oral three times a day PRN  metoprolol succinate  milliGRAM(s) Oral daily  aspirin enteric coated 81 milliGRAM(s) Oral daily  folic acid 1 milliGRAM(s) Oral daily  multivitamin 1 Tablet(s) Oral daily  thiamine 100 milliGRAM(s) Oral daily  nicotine -  14 mG/24Hr(s) Patch 1 Patch Transdermal daily  furosemide   Injectable 40 milliGRAM(s) IV Push two times a day  ALBUTerol    0.083% 2.5 milliGRAM(s) Nebulizer every 6 hours  ALBUTerol    0.083% 2.5 milliGRAM(s) Nebulizer every 2 hours PRN  levoFLOXacin IVPB 500 milliGRAM(s) IV Intermittent once  levoFLOXacin IVPB   IV Intermittent   lactulose Syrup 20 Gram(s) Oral three times a day  magnesium oxide 400 milliGRAM(s) Oral two times a day with meals      Assessment/Plan  #CHF exacerbation  Card elsy Hein appreciated  Cont IV Lasix BID  ECHO treviewed, unable to estimate EF  Monitor lytes    #Encephalopathy likely metabolic- improving  Elevated ammonia- started on lactulose  Monitor ammonia level  Abd emani  GI elsy STEPHENSWA protocol- not in withdrawal    #COPD/chronic vs acute on chronic bronchitis  Started on IV Levaquin  Pulm and ID elsy appreciated  Will cont IV abx over the weekend and switch to PO likely Monday  Nebs  CT chest w/o contrast reviewed, no pneumonia    #Chronic afib  Cont BB for rate control  Cont aspirin  Card elsy Hein appreciated    #Hypomagnesemia- replete    #Dispo- PT elsy

## 2017-08-19 NOTE — PROGRESS NOTE ADULT - SUBJECTIVE AND OBJECTIVE BOX
PCP:    REQUESTING PHYSICIAN:    REASON FOR CONSULT:    CHIEF COMPLAINT:    HPI:  63 y/o M PMHx significant for Afib, chronic EtOH use, (+)smoker, s/p Lt BKA (traumatic), who was referred to the ED for further evaluation progressive shortness of breath associated w/ anarsarca -> lower extremity edema and groin swelling. In the ED the patient was found to be hypoxic SaO2 91% on RA -> improved to 97% w/ 2L/min via NC, and in AFib w/ RVR. The patient was given Lasix 40mg IV x 1, and Metoprolol 5mg IV x 1. (17 Aug 2017 20:05) Pt is lethargic and unable to provide a cogent history. He was given sedation earlier.     17: Pt is awake and alert. He denies chest pain or shortness of breath. Rate controlled atrial fibrillation    PAST MEDICAL & SURGICAL HISTORY:  Alcohol abuse  Chronic atrial fibrillation  S/P BKA (below knee amputation) unilateral, left      SOCIAL HISTORY:    FAMILY HISTORY:  No pertinent family history in first degree relatives      ALLERGIES:  Allergies    Ceclor (Rash)    Intolerances        MEDICATIONS:    MEDICATIONS  (STANDING):  metoprolol succinate  milliGRAM(s) Oral daily  aspirin enteric coated 81 milliGRAM(s) Oral daily  folic acid 1 milliGRAM(s) Oral daily  multivitamin 1 Tablet(s) Oral daily  thiamine 100 milliGRAM(s) Oral daily  nicotine -  14 mG/24Hr(s) Patch 1 Patch Transdermal daily  furosemide   Injectable 40 milliGRAM(s) IV Push two times a day  ALBUTerol    0.083% 2.5 milliGRAM(s) Nebulizer every 6 hours  levoFLOXacin IVPB   IV Intermittent   magnesium oxide 400 milliGRAM(s) Oral two times a day with meals  levoFLOXacin IVPB 500 milliGRAM(s) IV Intermittent every 24 hours  lactulose Syrup 20 Gram(s) Oral every 6 hours    MEDICATIONS  (PRN):  acetaminophen   Tablet 650 milliGRAM(s) Oral every 6 hours PRN For Temp greater than 38 C (100.4 F)  acetaminophen   Tablet. 650 milliGRAM(s) Oral every 6 hours PRN Mild Pain (1 - 3)  senna 2 Tablet(s) Oral at bedtime PRN Constipation  docusate sodium 100 milliGRAM(s) Oral three times a day PRN Constipation  ALBUTerol    0.083% 2.5 milliGRAM(s) Nebulizer every 2 hours PRN Shortness of Breath and/or Wheezing      REVIEW OF SYSTEMS:    CONSTITUTIONAL: No weakness, fevers or chills  EYES/ENT: No visual changes;  No vertigo or throat pain   NECK: No pain or stiffness  RESPIRATORY: No cough, wheezing, hemoptysis; No shortness of breath  CARDIOVASCULAR: No chest pain or palpitations  GASTROINTESTINAL: No abdominal or epigastric pain. No nausea, vomiting, or hematemesis; No diarrhea or constipation. No melena or hematochezia.  GENITOURINARY: No dysuria, frequency or hematuria  NEUROLOGICAL: No numbness or weakness  SKIN: No itching, burning, rashes, or lesions   All other review of systems is negative unless indicated above    Vital Signs Last 24 Hrs  T(C): 36.2 (19 Aug 2017 10:15), Max: 37.1 (18 Aug 2017 16:52)  T(F): 97.1 (19 Aug 2017 10:15), Max: 98.8 (18 Aug 2017 16:52)  HR: 101 (19 Aug 2017 10:15) (74 - 106)  BP: 100/65 (19 Aug 2017 10:15) (100/65 - 119/51)  BP(mean): --  RR: 19 (19 Aug 2017 10:15) (19 - 20)  SpO2: 92% (19 Aug 2017 10:15) (92% - 95%)Daily     Daily Weight in k.7 (19 Aug 2017 10:15)I&O's Summary    18 Aug 2017 07:  -  19 Aug 2017 07:00  --------------------------------------------------------  IN: 100 mL / OUT: 1750 mL / NET: -1650 mL    19 Aug 2017 07:  -  19 Aug 2017 15:03  --------------------------------------------------------  IN: 0 mL / OUT: 250 mL / NET: -250 mL        PHYSICAL EXAM:    Constitutional: NAD, awake and alert, well-developed  HEENT: PERR, EOMI,  No oral cyananosis.  Neck:  supple,  No JVD  Respiratory: Breath sounds are clear bilaterally, No wheezing, rales or rhonchi  Cardiovascular: S1 and S2, regular rate and rhythm, no Murmurs, gallops or rubs  Gastrointestinal: Bowel Sounds present, soft, nontender.   Extremities: No peripheral edema. No clubbing or cyanosis.  Vascular: 2+ peripheral pulses  Neurological: A/O x 3, no focal deficits  Musculoskeletal: no calf tenderness.  Skin: No rashes.      LABS: All Labs Reviewed:                        13.4   8.6   )-----------( 237      ( 19 Aug 2017 06:04 )             42.5                         12.6   5.9   )-----------( 185      ( 18 Aug 2017 06:43 )             40.8                         12.8   7.3   )-----------( 166      ( 17 Aug 2017 16:21 )             39.9     19 Aug 2017 06:04    139    |  96     |  11     ----------------------------<  105    4.2     |  42     |  0.72   18 Aug 2017 06:43    141    |  104    |  12     ----------------------------<  94     4.2     |  32     |  0.72   17 Aug 2017 16:21    144    |  105    |  12     ----------------------------<  112    4.2     |  35     |  0.82     Ca    8.5        19 Aug 2017 06:04  Ca    8.2        18 Aug 2017 06:43  Ca    8.5        17 Aug 2017 16:21  Mg     1.5       18 Aug 2017 06:43    TPro  6.1    /  Alb  2.5    /  TBili  0.7    /  DBili  x      /  AST  17     /  ALT  16     /  AlkPhos  147    18 Aug 2017 06:43  TPro  5.8    /  Alb  2.6    /  TBili  0.9    /  DBili  x      /  AST  19     /  ALT  19     /  AlkPhos  146    17 Aug 2017 16:21    PT/INR - ( 18 Aug 2017 06:43 )   PT: 1< from: Transthoracic Echocardiogram (17 @ 09:47) >      < end of copied text >  4.5 sec;   INR: 1.33 ratio         PTT - ( 18 Aug 2017 06:43 )  PTT:35.6 sec  CARDIAC MARKERS ( 17 Aug 2017 20:02 )  0.016 ng/mL / x     / x     / x     / x      CARDIAC MARKERS ( 17 Aug 2017 16:21 )  <0.015 ng/mL / x     / 46 U/L / x     / x          Blood Culture:    @ 16:21  Pro Bnp [id number]     @ 06:43  TSH: 1.260      RADIOLOGY/EKG:< from: 12 Lead ECG (17 @ 16:19) >  Right axis deviation  Right ventricular hypertrophy  Septal infarct , age undetermined  Abnormal ECG  No previous ECGs available  Confirmed by VANGIE SMITH, ANDI ([id number]) on 2017 11:43:16 PM    < end of copied text >        ECHO/CARDIAC CATHTERIZATION/STRESS TEST:< from: Transthoracic Echocardiogram (17 @ 09:47) >  Moderate (2+) eccentric mitral regurgitation is present.   Normal aortic valve structure and function.   No aortic regurgitation is present.   The left atrium is moderately dilated.   Limited study due to patient being extremely combative   however, overall left ventricular size and systolic function appear   normal. Technically Difficult Study.   Due to the quality of the echocardiogram, a proper assessment of the   right   ventricular contractility (EF) cannot be made.   Trace pericardialeffusion is present.     Signature     ----------------------------------------------------------------   Electronically signed by Ignacia Arthur MD(Interpreting   physician) on 2017 03:52 PM   ----------------------------------------------------------------      < end of copied text >

## 2017-08-20 LAB
AMMONIA BLD-MCNC: 35 UMOL/L — HIGH (ref 11–32)
BASE EXCESS BLDA CALC-SCNC: 16 MMOL/L — HIGH (ref -2–2)
BUN SERPL-MCNC: 9 MG/DL — SIGNIFICANT CHANGE UP (ref 7–23)
CALCIUM SERPL-MCNC: 8.7 MG/DL — SIGNIFICANT CHANGE UP (ref 8.5–10.1)
CHLORIDE SERPL-SCNC: 92 MMOL/L — LOW (ref 96–108)
CO2 SERPL-SCNC: >45 MMOL/L — CRITICAL HIGH (ref 22–31)
CREAT SERPL-MCNC: 0.65 MG/DL — SIGNIFICANT CHANGE UP (ref 0.5–1.3)
GLUCOSE SERPL-MCNC: 101 MG/DL — HIGH (ref 70–99)
HCO3 BLDA-SCNC: 45 MMOL/L — HIGH (ref 21–29)
MAGNESIUM SERPL-MCNC: 1.5 MG/DL — LOW (ref 1.6–2.6)
PCO2 BLDA: 84 MMHG — CRITICAL HIGH (ref 32–46)
PH BLDA: 7.35 — SIGNIFICANT CHANGE UP (ref 7.35–7.45)
PHOSPHATE SERPL-MCNC: 3.1 MG/DL — SIGNIFICANT CHANGE UP (ref 2.5–4.5)
PO2 BLDA: 75 — SIGNIFICANT CHANGE UP
POTASSIUM SERPL-MCNC: 3.9 MMOL/L — SIGNIFICANT CHANGE UP (ref 3.5–5.3)
POTASSIUM SERPL-SCNC: 3.9 MMOL/L — SIGNIFICANT CHANGE UP (ref 3.5–5.3)
SAO2 % BLDA: 94 — SIGNIFICANT CHANGE UP
SODIUM SERPL-SCNC: 137 MMOL/L — SIGNIFICANT CHANGE UP (ref 135–145)

## 2017-08-20 PROCEDURE — 99233 SBSQ HOSP IP/OBS HIGH 50: CPT

## 2017-08-20 RX ORDER — MAGNESIUM SULFATE 500 MG/ML
1 VIAL (ML) INJECTION ONCE
Qty: 0 | Refills: 0 | Status: COMPLETED | OUTPATIENT
Start: 2017-08-20 | End: 2017-08-20

## 2017-08-20 RX ADMIN — ALBUTEROL 2.5 MILLIGRAM(S): 90 AEROSOL, METERED ORAL at 19:14

## 2017-08-20 RX ADMIN — LACTULOSE 20 GRAM(S): 10 SOLUTION ORAL at 13:29

## 2017-08-20 RX ADMIN — Medication 100 MILLIGRAM(S): at 13:30

## 2017-08-20 RX ADMIN — ALBUTEROL 2.5 MILLIGRAM(S): 90 AEROSOL, METERED ORAL at 21:06

## 2017-08-20 RX ADMIN — MAGNESIUM OXIDE 400 MG ORAL TABLET 400 MILLIGRAM(S): 241.3 TABLET ORAL at 08:57

## 2017-08-20 RX ADMIN — ALBUTEROL 2.5 MILLIGRAM(S): 90 AEROSOL, METERED ORAL at 14:00

## 2017-08-20 RX ADMIN — Medication 40 MILLIGRAM(S): at 18:34

## 2017-08-20 RX ADMIN — MAGNESIUM OXIDE 400 MG ORAL TABLET 400 MILLIGRAM(S): 241.3 TABLET ORAL at 18:34

## 2017-08-20 RX ADMIN — Medication 200 MILLIGRAM(S): at 06:27

## 2017-08-20 RX ADMIN — Medication 1 TABLET(S): at 13:29

## 2017-08-20 RX ADMIN — LACTULOSE 20 GRAM(S): 10 SOLUTION ORAL at 20:32

## 2017-08-20 RX ADMIN — ALBUTEROL 2.5 MILLIGRAM(S): 90 AEROSOL, METERED ORAL at 08:25

## 2017-08-20 RX ADMIN — Medication 40 MILLIGRAM(S): at 06:27

## 2017-08-20 RX ADMIN — Medication 81 MILLIGRAM(S): at 13:29

## 2017-08-20 RX ADMIN — ALBUTEROL 2.5 MILLIGRAM(S): 90 AEROSOL, METERED ORAL at 02:17

## 2017-08-20 RX ADMIN — Medication 1 PATCH: at 13:30

## 2017-08-20 RX ADMIN — Medication 100 GRAM(S): at 13:30

## 2017-08-20 RX ADMIN — LACTULOSE 20 GRAM(S): 10 SOLUTION ORAL at 08:57

## 2017-08-20 RX ADMIN — Medication 1 MILLIGRAM(S): at 13:29

## 2017-08-20 NOTE — PROGRESS NOTE ADULT - SUBJECTIVE AND OBJECTIVE BOX
PCP:    REQUESTING PHYSICIAN:    REASON FOR CONSULT:    CHIEF COMPLAINT:    HPI:  63 y/o M PMHx significant for Afib, chronic EtOH use, (+)smoker, s/p Lt BKA (traumatic), who was referred to the ED for further evaluation progressive shortness of breath associated w/ anarsarca -> lower extremity edema and groin swelling. In the ED the patient was found to be hypoxic SaO2 91% on RA -> improved to 97% w/ 2L/min via NC, and in AFib w/ RVR. The patient was given Lasix 40mg IV x 1, and Metoprolol 5mg IV x 1. (17 Aug 2017 20:05) Pt is lethargic and unable to provide a cogent history. He was given sedation earlier.     8/19/17: Pt is awake and alert. He denies chest pain or shortness of breath. Rate controlled atrial fibrillation  8/20/17: Pt is awake and alert. No chest pain or shortness of breath. Afib rate is controlled. Pt reports that he sees a cardiologist in the community but doesn't recall the name. His daughter was consulted and she was unaware of the cardiologist's name.    PAST MEDICAL & SURGICAL HISTORY:  Alcohol abuse  Chronic atrial fibrillation  S/P BKA (below knee amputation) unilateral, left      SOCIAL HISTORY:    FAMILY HISTORY:  No pertinent family history in first degree relatives      ALLERGIES:  Allergies    Ceclor (Rash)    Intolerances        MEDICATIONS:    MEDICATIONS  (STANDING):  metoprolol succinate  milliGRAM(s) Oral daily  aspirin enteric coated 81 milliGRAM(s) Oral daily  folic acid 1 milliGRAM(s) Oral daily  multivitamin 1 Tablet(s) Oral daily  nicotine -  14 mG/24Hr(s) Patch 1 Patch Transdermal daily  furosemide   Injectable 40 milliGRAM(s) IV Push two times a day  ALBUTerol    0.083% 2.5 milliGRAM(s) Nebulizer every 6 hours  magnesium oxide 400 milliGRAM(s) Oral two times a day with meals  lactulose Syrup 20 Gram(s) Oral every 6 hours  levoFLOXacin  Tablet 500 milliGRAM(s) Oral every 24 hours    MEDICATIONS  (PRN):  acetaminophen   Tablet 650 milliGRAM(s) Oral every 6 hours PRN For Temp greater than 38 C (100.4 F)  acetaminophen   Tablet. 650 milliGRAM(s) Oral every 6 hours PRN Mild Pain (1 - 3)  senna 2 Tablet(s) Oral at bedtime PRN Constipation  docusate sodium 100 milliGRAM(s) Oral three times a day PRN Constipation  ALBUTerol    0.083% 2.5 milliGRAM(s) Nebulizer every 2 hours PRN Shortness of Breath and/or Wheezing        Vital Signs Last 24 Hrs  T(C): 36.8 (20 Aug 2017 17:08), Max: 36.8 (20 Aug 2017 17:08)  T(F): 98.2 (20 Aug 2017 17:08), Max: 98.2 (20 Aug 2017 17:08)  HR: 76 (20 Aug 2017 17:08) (72 - 92)  BP: 87/55 (20 Aug 2017 17:08) (87/55 - 125/77)  BP(mean): --  RR: 18 (20 Aug 2017 17:08) (18 - 18)  SpO2: 94% (20 Aug 2017 17:08) (94% - 100%)Daily     Daily I&O's Summary    19 Aug 2017 07:01  -  20 Aug 2017 07:00  --------------------------------------------------------  IN: 600 mL / OUT: 2050 mL / NET: -1450 mL    20 Aug 2017 07:01  -  20 Aug 2017 19:11  --------------------------------------------------------  IN: 100 mL / OUT: 1300 mL / NET: -1200 mL        PHYSICAL EXAM:    Constitutional: NAD, awake and alert, well-developed  HEENT: PERR, EOMI,  No oral cyananosis.  Neck:  supple,  No JVD  Respiratory: Breath sounds are clear bilaterally, No wheezing, rales or rhonchi  Cardiovascular: S1 and S2, irregular  Gastrointestinal: Bowel Sounds present, soft, nontender.   Extremities: 2+ edema right lower extremity, bka left  Vascular: diminished  Neurological: A/O x 3, no focal deficits  Musculoskeletal: no calf tenderness.  Skin: No rashes.      LABS: All Labs Reviewed:                        13.4   8.6   )-----------( 237      ( 19 Aug 2017 06:04 )             42.5                         12.6   5.9   )-----------( 185      ( 18 Aug 2017 06:43 )             40.8     20 Aug 2017 06:18    137    |  92     |  9      ----------------------------<  101    3.9     |  >45    |  0.65   19 Aug 2017 06:04    139    |  96     |  11     ----------------------------<  105    4.2     |  42     |  0.72   18 Aug 2017 06:43    141    |  104    |  12     ----------------------------<  94     4.2     |  32     |  0.72     Ca    8.7        20 Aug 2017 06:18  Ca    8.5        19 Aug 2017 06:04  Ca    8.2        18 Aug 2017 06:43  Phos  3.1       20 Aug 2017 06:18  Mg     1.5       20 Aug 2017 06:18  Mg     1.5       18 Aug 2017 06:43    TPro  6.1    /  Alb  2.5    /  TBili  0.7    /  DBili  x      /  AST  17     /  ALT  16     /  AlkPhos  147    18 Aug 2017 06:43          Blood Culture:     08-18 @ 06:43  TSH: 1.260      RADIOLOGY/EKG:< from: 12 Lead ECG (08.17.17 @ 16:19) >  Diagnosis Line Atrial fibrillation with rapid ventricular response  Right axis deviation  Right ventricular hypertrophy  Septal infarct , age undetermined  Abnormal ECG  No previous ECGs available  Confirmed by VANGIE SMITH, ANDI ([id number]) on 8/17/2017 11:43:16 PM    < end of copied text >        ECHO/CARDIAC CATHTERIZATION/STRESS TEST:< from: Transthoracic Echocardiogram (08.18.17 @ 09:47) >  Moderate (2+) eccentric mitral regurgitation is present.   Normal aortic valve structure and function.   No aortic regurgitation is present.   The left atrium is moderately dilated.   Limited study due to patient being extremely combative   however, overall left ventricular size and systolic function appear   normal. Technically Difficult Study.   Due to the quality of the echocardiogram, a proper assessment of the   right   ventricular contractility (EF) cannot be made.   Trace pericardialeffusion is present.     Signature     ----------------------------------------------------------------   Electronically signed by RADHA FrnechInterpreting   physician) on 08/18/2017 03:52 PM   ----------------------------------------------------------------    < end of copied text >

## 2017-08-20 NOTE — PROGRESS NOTE ADULT - PROBLEM SELECTOR PLAN 2
Chads 1. Pt on aspirin and metoprolol. D/C monitor
Chads 1. Pt on aspirin and metoprolol. Continue to monitor

## 2017-08-20 NOTE — PROGRESS NOTE ADULT - PROBLEM SELECTOR PLAN 1
No evidence of withdrawal. Ventricular rate controlled. Continue ASA. Can discontinue telemetry
No evidence of withdrawal. Ventricular rate controlled

## 2017-08-20 NOTE — PROVIDER CONTACT NOTE (CRITICAL VALUE NOTIFICATION) - SITUATION
critical value pco2
critical lab value
Lab on downtime, results sent up to the floor previous to critical value being called. Results reported to Dr. Betts when report was sent to the floor

## 2017-08-20 NOTE — PROVIDER CONTACT NOTE (OTHER) - SITUATION
Consult made spoke with Marva from answering service
Consult made spoke with yolie from doctors office
Dr. Hein's office notified.
Spoke with Goldy
Pt difficult to arouse; coarse lung sounds and desaturating on pulse ox
Spoke with Dr. Cullen's answering service to request consult for AM.

## 2017-08-20 NOTE — PROGRESS NOTE ADULT - SUBJECTIVE AND OBJECTIVE BOX
Subjective:    pat was on bipap last night, not tolerated very well, still lathergic, will put bipap back for 2hrs.Serum ammonia level improving.    MEDICATIONS  (STANDING):  metoprolol succinate  milliGRAM(s) Oral daily  aspirin enteric coated 81 milliGRAM(s) Oral daily  folic acid 1 milliGRAM(s) Oral daily  multivitamin 1 Tablet(s) Oral daily  thiamine 100 milliGRAM(s) Oral daily  nicotine -  14 mG/24Hr(s) Patch 1 Patch Transdermal daily  furosemide   Injectable 40 milliGRAM(s) IV Push two times a day  ALBUTerol    0.083% 2.5 milliGRAM(s) Nebulizer every 6 hours  magnesium oxide 400 milliGRAM(s) Oral two times a day with meals  lactulose Syrup 20 Gram(s) Oral every 6 hours  magnesium sulfate  IVPB 1 Gram(s) IV Intermittent once  levoFLOXacin  Tablet 500 milliGRAM(s) Oral every 24 hours    MEDICATIONS  (PRN):  acetaminophen   Tablet 650 milliGRAM(s) Oral every 6 hours PRN For Temp greater than 38 C (100.4 F)  acetaminophen   Tablet. 650 milliGRAM(s) Oral every 6 hours PRN Mild Pain (1 - 3)  senna 2 Tablet(s) Oral at bedtime PRN Constipation  docusate sodium 100 milliGRAM(s) Oral three times a day PRN Constipation  ALBUTerol    0.083% 2.5 milliGRAM(s) Nebulizer every 2 hours PRN Shortness of Breath and/or Wheezing      Allergies    Ceclor (Rash)    Intolerances        Vital Signs Last 24 Hrs  T(C): 36.7 (20 Aug 2017 10:10), Max: 36.7 (19 Aug 2017 16:14)  T(F): 98 (20 Aug 2017 10:10), Max: 98.1 (20 Aug 2017 04:12)  HR: 90 (20 Aug 2017 10:10) (76 - 103)  BP: 109/55 (20 Aug 2017 10:10) (108/64 - 125/77)  BP(mean): --  RR: 18 (20 Aug 2017 10:10) (18 - 18)  SpO2: 100% (20 Aug 2017 10:10) (98% - 100%)    PHYSICAL EXAMINATION:    NECK:  Supple. No lymphadenopathy. Jugular venous pressure not elevated. Carotids equal.   HEART:   The cardiac impulse has a normal quality. Reg., Nl S1 and S2.  There are no murmurs, rubs or gallops noted  CHEST:  Chest is clear to auscultation. Normal respiratory effort.  ABDOMEN:  Soft and nontender.   EXTREMITIES:  There is no edema.       LABS:                        13.4   8.6   )-----------( 237      ( 19 Aug 2017 06:04 )             42.5     08-20    137  |  92<L>  |  9   ----------------------------<  101<H>  3.9   |  >45<HH>  |  0.65    Ca    8.7      20 Aug 2017 06:18  Phos  3.1     08-20  Mg     1.5     08-20    ABG - ( 19 Aug 2017 18:33 )  pH: 7.30  /  pCO2: 84    /  pO2: 82    / HCO3: 40    / Base Excess: 11    /  SaO2: 95

## 2017-08-20 NOTE — PROGRESS NOTE ADULT - PROBLEM SELECTOR PLAN 3
Echo reviewed, medical follow up. Continue diuresis. No acute cardiac issues, will follow up prn. Pt will follow up with his cardiologist as an opt
Echo reviewed, medical follow up. Continue diuresis.

## 2017-08-20 NOTE — PROGRESS NOTE ADULT - SUBJECTIVE AND OBJECTIVE BOX
CC- Patient is a 62y old  Male who presents with a chief complaint of Shortness of Breath (17 Aug 2017 20:05)    HPI:  61 y/o M PMHx significant for Afib, chronic EtOH use, (+)smoker, s/p Lt BKA, who was referred to the ED for further evaluation progressive shortness of breath associated w/ anarsarca -> lower extremity edema and groin swelling. In the ED the patient was found to be hypoxic SaO2 91% on RA -> improved to 97% w/ 2L/min via NC, and in AFib w/ RVR. The patient was given Lasix 40mg IV x 1, and Metoprolol 5mg IV x 1. (17 Aug 2017 20:05)    17 pt is drowsy  17 more awake and alert, did not hold BiPAP. On NC but desats when takes off  17 on BiPAP, more awake    Review of system- negative other than HPI    Vital Signs Last 24 Hrs  T(C): 36.7 (20 Aug 2017 10:10), Max: 36.7 (19 Aug 2017 16:14)  T(F): 98 (20 Aug 2017 10:10), Max: 98.1 (20 Aug 2017 04:12)  HR: 90 (20 Aug 2017 10:10) (76 - 103)  BP: 109/55 (20 Aug 2017 10:10) (108/64 - 125/77)  BP(mean): --  RR: 18 (20 Aug 2017 10:10) (18 - 18)  SpO2: 100% (20 Aug 2017 10:10) (98% - 100%)    LABS:                        13.4   8.6   )-----------( 237      ( 19 Aug 2017 06:04 )             42.5     20 Aug 2017 06:18    137    |  92     |  9      ----------------------------<  101    3.9     |  >45    |  0.65     Ca    8.7        20 Aug 2017 06:18  Phos  3.1       20 Aug 2017 06:18  Mg     1.5       20 Aug 2017 06:18    RADIOLOGY & ADDITIONAL TESTS:  EXAM:  CHEST SINGLE VIEW FRONTAL                        PROCEDURE DATE:  2017    INTERPRETATION:  Portable chest radiograph dated 2017.  COMPARISON: None available.    CLINICAL INFORMATION: Chest pain.  FINDINGS:    The airway is midline.  There are no airspace consolidations.  There is no pleural effusion or pneumothorax.   Cardiomegaly..   The bones are normal.     IMPRESSION:  Cardiomegaly but no evidence of active diseases in the lungs.    EXAM:  CT CHEST                        PROCEDURE DATE:  2017    INTERPRETATION:  HISTORY: Shortness of breath. Evaluate for pneumonia.    TECHNIQUE: A non-contrast CT scan of the chest was performed from the   thoracic inlet to the adrenal glands.  Coronal and sagittal reformatted   images are provided.    COMPARISON: None available.    FINDINGS:   There is motion artifact present which degrades image quality.  Allowing for motion there is no evidence for pneumonia. No suspicious   pulmonary nodule or mass is identified. Linear scarring versus   atelectasis is seen in the right upper lobe. There is a small right   pleural effusion with minimal adjacent compressive atelectasis. A trace   left pleural effusion is identified.Questionable diffuse mild   interstitial thickening.  The trachea and main bronchi are clear. There is no pneumothorax.  The heart is enlarged. There is a trace pericardial effusion. The   thoracic aorta is normal in caliber. The main pulmonary artery is dilated   t  The thyroid gland is unremarkable.  There is no significant axillary, mediastinal, or hilar lymphadenopathy.  The visualized bones do not demonstrate abnormality.  Limited images of the upper abdomen demonstrate nonspecific partially   visualized perinephric stranding. Punctate calcifications scattered   throughout the spleen, likely the sequela prior granulomatous disease.    IMPRESSION:   Motion degraded exam.  No gross evidence for pneumonia.  Cardiomegaly with small right and trace left pleural effusions.   Questionable minimal pulmonary edema.    PHYSICAL EXAM:  GENERAL: ill-looking  HEAD:  Atraumatic, Normocephalic  EYES: EOMI, PERRLA, conjunctiva and sclera clear  HEENT: Moist mucous membranes  NECK: Supple, No JVD  NERVOUS SYSTEM:  Awake and responsive  CHEST/LUNG: occasional rhonchi  HEART: Regular rate and rhythm; No murmurs, rubs, or gallops  ABDOMEN: Soft, Nontender, Nondistended; Bowel sounds present  GENITOURINARY- Voiding, no palpable bladder  EXTREMITIES:  RLE swelling improving, LT- BKA  MUSCULOSKELTAL- No muscle tenderness, Muscle tone normal, No joint tenderness, no Joint swelling, Joint range of motion-normal  SKIN-no rash, no lesion    Daily Height in cm: 187.96 (17 Aug 2017 21:57)    Daily Weight in k.7 (18 Aug 2017 12:10)    acetaminophen   Tablet 650 milliGRAM(s) Oral every 6 hours PRN  acetaminophen   Tablet. 650 milliGRAM(s) Oral every 6 hours PRN  senna 2 Tablet(s) Oral at bedtime PRN  docusate sodium 100 milliGRAM(s) Oral three times a day PRN  metoprolol succinate  milliGRAM(s) Oral daily  aspirin enteric coated 81 milliGRAM(s) Oral daily  folic acid 1 milliGRAM(s) Oral daily  multivitamin 1 Tablet(s) Oral daily  thiamine 100 milliGRAM(s) Oral daily  nicotine -  14 mG/24Hr(s) Patch 1 Patch Transdermal daily  furosemide   Injectable 40 milliGRAM(s) IV Push two times a day  ALBUTerol    0.083% 2.5 milliGRAM(s) Nebulizer every 6 hours  ALBUTerol    0.083% 2.5 milliGRAM(s) Nebulizer every 2 hours PRN  levoFLOXacin IVPB 500 milliGRAM(s) IV Intermittent once  levoFLOXacin IVPB   IV Intermittent   lactulose Syrup 20 Gram(s) Oral three times a day  magnesium oxide 400 milliGRAM(s) Oral two times a day with meals      Assessment/Plan  #CHF exacerbation  Card elsy Hein appreciated  Switch to PO lasix  ECHO reviewed, unable to estimate EF  Monitor lytes    #Encephalopathy likely metabolic- improving  Elevated ammonia- ?hepatic encephalopathy  Monitor ammonia level- improving  Abd sono- pending  GI elsy Tyson pending  [facility] protocol- not in withdrawal    #COPD/chronic vs acute on chronic bronchitis  Started on IV Levaquin- no pneumonia, switch to PO Levaquin  Pulm and ID elsy appreciated  Nebs  CT chest w/o contrast reviewed, no pneumonia    #Chronic afib  Cont BB for rate control  Cont aspirin  Card elsy Hein appreciated    #Hypomagnesemia- replete    #Dispo- can DC tele, PT elsy

## 2017-08-20 NOTE — PROGRESS NOTE ADULT - ASSESSMENT
PROBLEMS;    Ac on chronic hypercapnic & hypoxamic respiratory failure  OHS/VIJAY  CHF exacerbation  Encephalopathy likely metabolic/hepatic-Elevated ammonia  COPD/chronic vs acute on chronic bronchitis  Chronic afib    PLAN;    IV LEVAQUIN  BIPAP AS TOLERATED  REPEAT ABG  MONITOR AMMONIA LEVEL  SUPPORTIVE CARE  DVT PROPHYLASIX

## 2017-08-21 LAB
AMMONIA BLD-MCNC: 65 UMOL/L — HIGH (ref 11–32)
ANION GAP SERPL CALC-SCNC: 1 MMOL/L — LOW (ref 5–17)
BUN SERPL-MCNC: 13 MG/DL — SIGNIFICANT CHANGE UP (ref 7–23)
CALCIUM SERPL-MCNC: 8.3 MG/DL — LOW (ref 8.5–10.1)
CHLORIDE SERPL-SCNC: 94 MMOL/L — LOW (ref 96–108)
CO2 SERPL-SCNC: 40 MMOL/L — HIGH (ref 22–31)
CREAT SERPL-MCNC: 0.66 MG/DL — SIGNIFICANT CHANGE UP (ref 0.5–1.3)
GLUCOSE SERPL-MCNC: 92 MG/DL — SIGNIFICANT CHANGE UP (ref 70–99)
HCT VFR BLD CALC: 37.5 % — LOW (ref 39–50)
HGB BLD-MCNC: 12 G/DL — LOW (ref 13–17)
MAGNESIUM SERPL-MCNC: 1.7 MG/DL — SIGNIFICANT CHANGE UP (ref 1.6–2.6)
MCHC RBC-ENTMCNC: 30.4 PG — SIGNIFICANT CHANGE UP (ref 27–34)
MCHC RBC-ENTMCNC: 31.9 GM/DL — LOW (ref 32–36)
MCV RBC AUTO: 95.2 FL — SIGNIFICANT CHANGE UP (ref 80–100)
PLATELET # BLD AUTO: 183 K/UL — SIGNIFICANT CHANGE UP (ref 150–400)
POTASSIUM SERPL-MCNC: 3.9 MMOL/L — SIGNIFICANT CHANGE UP (ref 3.5–5.3)
POTASSIUM SERPL-SCNC: 3.9 MMOL/L — SIGNIFICANT CHANGE UP (ref 3.5–5.3)
RBC # BLD: 3.94 M/UL — LOW (ref 4.2–5.8)
RBC # FLD: 15.4 % — HIGH (ref 10.3–14.5)
SODIUM SERPL-SCNC: 135 MMOL/L — SIGNIFICANT CHANGE UP (ref 135–145)
WBC # BLD: 7.1 K/UL — SIGNIFICANT CHANGE UP (ref 3.8–10.5)
WBC # FLD AUTO: 7.1 K/UL — SIGNIFICANT CHANGE UP (ref 3.8–10.5)

## 2017-08-21 PROCEDURE — 76700 US EXAM ABDOM COMPLETE: CPT | Mod: 26

## 2017-08-21 RX ORDER — FUROSEMIDE 40 MG
40 TABLET ORAL DAILY
Qty: 0 | Refills: 0 | Status: DISCONTINUED | OUTPATIENT
Start: 2017-08-22 | End: 2017-08-23

## 2017-08-21 RX ADMIN — Medication 81 MILLIGRAM(S): at 12:04

## 2017-08-21 RX ADMIN — LACTULOSE 20 GRAM(S): 10 SOLUTION ORAL at 21:36

## 2017-08-21 RX ADMIN — LACTULOSE 20 GRAM(S): 10 SOLUTION ORAL at 15:26

## 2017-08-21 RX ADMIN — Medication 40 MILLIGRAM(S): at 05:43

## 2017-08-21 RX ADMIN — Medication 1 TABLET(S): at 12:04

## 2017-08-21 RX ADMIN — Medication 650 MILLIGRAM(S): at 03:07

## 2017-08-21 RX ADMIN — LACTULOSE 20 GRAM(S): 10 SOLUTION ORAL at 02:43

## 2017-08-21 RX ADMIN — ALBUTEROL 2.5 MILLIGRAM(S): 90 AEROSOL, METERED ORAL at 19:26

## 2017-08-21 RX ADMIN — ALBUTEROL 2.5 MILLIGRAM(S): 90 AEROSOL, METERED ORAL at 02:21

## 2017-08-21 RX ADMIN — Medication 1 MILLIGRAM(S): at 12:04

## 2017-08-21 RX ADMIN — LACTULOSE 20 GRAM(S): 10 SOLUTION ORAL at 08:36

## 2017-08-21 RX ADMIN — MAGNESIUM OXIDE 400 MG ORAL TABLET 400 MILLIGRAM(S): 241.3 TABLET ORAL at 08:36

## 2017-08-21 RX ADMIN — ALBUTEROL 2.5 MILLIGRAM(S): 90 AEROSOL, METERED ORAL at 13:48

## 2017-08-21 RX ADMIN — Medication 40 MILLIGRAM(S): at 17:29

## 2017-08-21 RX ADMIN — Medication 200 MILLIGRAM(S): at 05:44

## 2017-08-21 RX ADMIN — Medication 650 MILLIGRAM(S): at 21:59

## 2017-08-21 RX ADMIN — Medication 1 PATCH: at 12:04

## 2017-08-21 NOTE — CONSULT NOTE ADULT - SUBJECTIVE AND OBJECTIVE BOX
Patient is a 62y old  Male who presents with a chief complaint of Shortness of Breath (17 Aug 2017 20:05)      HPI:  63 y/o M PMHx significant for Afib, chronic EtOH use, (+)smoker, s/p Lt BKA, admitted 8'/17 with progressive shortness of breath associated w/ anarsarca -> lower extremity edema and groin swelling. In the ED the patient was found to be hypoxic SaO2 91% on RA -> improved to 97% w/ 2L/min via NC, and in AFib w/ RVR. Over hospital course treated for afib/chf with improvement, noted to have worsening coughing w/ sputum, CT chest with b/l effusions/cardiomegaly, no obvious pna, some prior lung scarring, atelectasis was given IV levaquin for bronchitis.      PMH: as above    PSH: as above    Meds: per reconciliation sheet, noted below    MEDICATIONS  (STANDING):  metoprolol succinate  milliGRAM(s) Oral daily  aspirin enteric coated 81 milliGRAM(s) Oral daily  folic acid 1 milliGRAM(s) Oral daily  multivitamin 1 Tablet(s) Oral daily  thiamine 100 milliGRAM(s) Oral daily  nicotine -  14 mG/24Hr(s) Patch 1 Patch Transdermal daily  furosemide   Injectable 40 milliGRAM(s) IV Push two times a day  ALBUTerol    0.083% 2.5 milliGRAM(s) Nebulizer every 6 hours  levoFLOXacin IVPB   IV Intermittent   lactulose Syrup 20 Gram(s) Oral three times a day  magnesium oxide 400 milliGRAM(s) Oral two times a day with meals  levoFLOXacin IVPB 500 milliGRAM(s) IV Intermittent every 24 hours      Allergies    Ceclor (Rash)    Intolerances        Social: no smoking, no alcohol, no illegal drugs; no recent travel, no exposure to TB    Family history:  No pertinent family history in first degree relatives      ROS: the patient denies fever, no chills, no HA, no dizziness, no sore throat, no blurry vision, no CP, no palpitations,  no abdominal pain, no diarrhea, no N/V, no dysuria, no leg pain, no claudication, no rash, no joint aches, no rectal pain or bleeding, no night sweats    Vital Signs Last 24 Hrs  T(C): 36.2 (19 Aug 2017 10:15), Max: 37.1 (18 Aug 2017 16:52)  T(F): 97.1 (19 Aug 2017 10:15), Max: 98.8 (18 Aug 2017 16:52)  HR: 101 (19 Aug 2017 10:15) (74 - 106)  BP: 100/65 (19 Aug 2017 10:15) (100/65 - 119/51)  BP(mean): --  RR: 19 (19 Aug 2017 10:15) (19 - 20)  SpO2: 92% (19 Aug 2017 10:15) (92% - 95%)      PE:  Constitutional: frail looking  HEENT: NC/AT, EOMI, PERRLA  Neck: supple  Back: no tenderness  Respiratory: decreased breath sounds, few exp wheezes  Cardiovascular: S1S2 regular, no murmurs  Abdomen: soft, not tender, not distended, positive BS  Genitourinary: deferred  Rectal: deferred  Musculoskeletal: no muscle tenderness, no joint swelling or tenderness  Extremities: pedal edema  Neurological: AxOx3, moving all extremities, no focal deficits  Skin: no rashes    Labs:                        13.4   8.6   )-----------( 237      ( 19 Aug 2017 06:04 )             42.5     08-19    139  |  96  |  11  ----------------------------<  105<H>  4.2   |  42<H>  |  0.72    Ca    8.5      19 Aug 2017 06:04  Mg     1.5     08-18    TPro  6.1  /  Alb  2.5<L>  /  TBili  0.7  /  DBili  x   /  AST  17  /  ALT  16  /  AlkPhos  147<H>  08-18     LIVER FUNCTIONS - ( 18 Aug 2017 06:43 )  Alb: 2.5 g/dL / Pro: 6.1 gm/dL / ALK PHOS: 147 U/L / ALT: 16 U/L / AST: 17 U/L / GGT: x                   Radiology: < from: CT Chest No Cont (08.18.17 @ 14:48) >    EXAM:  CT CHEST                            PROCEDURE DATE:  08/18/2017          INTERPRETATION:  HISTORY: Shortness of breath. Evaluate for pneumonia.    TECHNIQUE: A non-contrast CT scan of the chest was performed from the   thoracic inlet to the adrenal glands.  Coronal and sagittal reformatted   images are provided.    COMPARISON: None available.    FINDINGS:   There is motion artifact present which degrades image quality.    Allowing for motion there is no evidence for pneumonia. No suspicious   pulmonary nodule or mass is identified. Linear scarring versus   atelectasis is seen in the right upper lobe. There is a small right   pleural effusion with minimal adjacent compressive atelectasis. A trace   left pleural effusion is identified.Questionable diffuse mild   interstitial thickening.    The trachea and main bronchi are clear. There is no pneumothorax.    The heart is enlarged. There is a trace pericardial effusion. The   thoracic aorta is normal in caliber. The main pulmonary artery is dilated   to 4.0 cm at the level of the ascending aorta.    The thyroid gland is unremarkable.    There is no significant axillary, mediastinal, or hilar lymphadenopathy.    The visualized bones do not demonstrate abnormality.    Limited images of the upper abdomen demonstrate nonspecific partially   visualized perinephric stranding. Punctate calcifications scattered   throughout the spleen, likely the sequela prior granulomatous disease.    IMPRESSION:   Motion degraded exam.    No grossevidence for pneumonia.    Cardiomegaly with small right and trace left pleural effusions.   Questionable minimal pulmonary edema.    < end of copied text >      Advanced directives addressed: full resuscitation
HPI:  61 y/o M PMHx significant for Afib, chronic EtOH use, (+)smoker, s/p Lt BKA, who was referred to the ED for further evaluation progressive shortness of breath associated w/ anarsarca -> lower extremity edema and groin swelling. In the ED the patient was found to be hypoxic SaO2 91% on RA -> improved to 97% w/ 2L/min via NC, and in AFib w/ RVR. The patient was given Lasix 40mg IV x 1, and Metoprolol 5mg IV x 1.    Patient denies to me alcohol abuse and says no etoh for 4 weeks. Here, alk phos and ammonia both a little high but ast/alt/bili wnl. denies any prior liver issues      PAST MEDICAL & SURGICAL HISTORY:  Alcohol abuse  Chronic atrial fibrillation  S/P BKA (below knee amputation) unilateral, left      MEDICATIONS  (STANDING):  metoprolol succinate  milliGRAM(s) Oral daily  aspirin enteric coated 81 milliGRAM(s) Oral daily  folic acid 1 milliGRAM(s) Oral daily  multivitamin 1 Tablet(s) Oral daily  nicotine -  14 mG/24Hr(s) Patch 1 Patch Transdermal daily  furosemide   Injectable 40 milliGRAM(s) IV Push two times a day  ALBUTerol    0.083% 2.5 milliGRAM(s) Nebulizer every 6 hours  magnesium oxide 400 milliGRAM(s) Oral two times a day with meals  lactulose Syrup 20 Gram(s) Oral every 6 hours  levoFLOXacin  Tablet 500 milliGRAM(s) Oral every 24 hours    MEDICATIONS  (PRN):  acetaminophen   Tablet 650 milliGRAM(s) Oral every 6 hours PRN For Temp greater than 38 C (100.4 F)  acetaminophen   Tablet. 650 milliGRAM(s) Oral every 6 hours PRN Mild Pain (1 - 3)  senna 2 Tablet(s) Oral at bedtime PRN Constipation  docusate sodium 100 milliGRAM(s) Oral three times a day PRN Constipation  ALBUTerol    0.083% 2.5 milliGRAM(s) Nebulizer every 2 hours PRN Shortness of Breath and/or Wheezing      Allergies    Ceclor (Rash)    Intolerances          FAMILY HISTORY:  No pertinent family history in first degree relatives      ROS  As above  Otherwise unremarkable    Vital Signs Last 24 Hrs  T(C): 36.8 (21 Aug 2017 05:08), Max: 36.9 (20 Aug 2017 20:35)  T(F): 98.3 (21 Aug 2017 05:08), Max: 98.4 (20 Aug 2017 20:35)  HR: 86 (21 Aug 2017 05:08) (72 - 100)  BP: 102/58 (21 Aug 2017 05:08) (87/55 - 109/55)  BP(mean): --  RR: 18 (21 Aug 2017 05:08) (18 - 18)  SpO2: 97% (21 Aug 2017 05:08) (93% - 100%)    Constitutional: NAD, well-developed  Respiratory: CTAB  Cardiovascular: irreg  Gastrointestinal: BS+, soft, NT/ND  Extremities: RLE AKA, LLE edema  Psychiatric: Normal mood, normal affect  Skin: No rashes  Neuro A/O x 3, no flap    LABS:                        12.0   7.1   )-----------( 183      ( 21 Aug 2017 05:40 )             37.5     08-21    135  |  94<L>  |  13  ----------------------------<  92  3.9   |  40<H>  |  0.66    Ca    8.3<L>      21 Aug 2017 05:40  Phos  3.1     08-20  Mg     1.7     08-21
PCP:    REQUESTING PHYSICIAN:    REASON FOR CONSULT:    CHIEF COMPLAINT:    HPI:  63 y/o M PMHx significant for Afib, chronic EtOH use, (+)smoker, s/p Lt BKA (traumatic), who was referred to the ED for further evaluation progressive shortness of breath associated w/ anarsarca -> lower extremity edema and groin swelling. In the ED the patient was found to be hypoxic SaO2 91% on RA -> improved to 97% w/ 2L/min via NC, and in AFib w/ RVR. The patient was given Lasix 40mg IV x 1, and Metoprolol 5mg IV x 1. (17 Aug 2017 20:05) Pt is lethargic and unable to provide a cogent history. He was given sedation earlier.       PAST MEDICAL & SURGICAL HISTORY:  Alcohol abuse  Chronic atrial fibrillation  S/P BKA (below knee amputation) unilateral, left      Allergies    Ceclor (Rash)    Intolerances        SOCIAL HISTORY:    FAMILY HISTORY:  No pertinent family history in first degree relatives      MEDICATIONS:  MEDICATIONS  (STANDING):  metoprolol succinate  milliGRAM(s) Oral daily  aspirin enteric coated 81 milliGRAM(s) Oral daily  folic acid 1 milliGRAM(s) Oral daily  multivitamin 1 Tablet(s) Oral daily  thiamine 100 milliGRAM(s) Oral daily  nicotine -  14 mG/24Hr(s) Patch 1 Patch Transdermal daily  furosemide   Injectable 40 milliGRAM(s) IV Push two times a day  ALBUTerol    0.083% 2.5 milliGRAM(s) Nebulizer every 6 hours  levoFLOXacin IVPB   IV Intermittent   lactulose Syrup 20 Gram(s) Oral three times a day  magnesium oxide 400 milliGRAM(s) Oral two times a day with meals    MEDICATIONS  (PRN):  acetaminophen   Tablet 650 milliGRAM(s) Oral every 6 hours PRN For Temp greater than 38 C (100.4 F)  acetaminophen   Tablet. 650 milliGRAM(s) Oral every 6 hours PRN Mild Pain (1 - 3)  senna 2 Tablet(s) Oral at bedtime PRN Constipation  docusate sodium 100 milliGRAM(s) Oral three times a day PRN Constipation  ALBUTerol    0.083% 2.5 milliGRAM(s) Nebulizer every 2 hours PRN Shortness of Breath and/or Wheezing      Unable to obtain ROS due to patient's mental status    Vital Signs Last 24 Hrs  T(C): 36.7 (18 Aug 2017 21:10), Max: 37.4 (17 Aug 2017 22:26)  T(F): 98.1 (18 Aug 2017 21:10), Max: 99.4 (17 Aug 2017 22:26)  HR: 83 (18 Aug 2017 21:10) (74 - 140)  BP: 119/51 (18 Aug 2017 21:10) (103/51 - 147/80)  BP(mean): --  RR: 20 (18 Aug 2017 21:10) (18 - 24)  SpO2: 94% (18 Aug 2017 21:10) (92% - 98%)    I&O's Summary    17 Aug 2017 07:01  -  18 Aug 2017 07:00  --------------------------------------------------------  IN: 0 mL / OUT: 350 mL / NET: -350 mL    18 Aug 2017 07:01  -  18 Aug 2017 21:41  --------------------------------------------------------  IN: 100 mL / OUT: 1025 mL / NET: -925 mL        PHYSICAL EXAM:    Constitutional: somnolent  HEENT: PERR, EOMI,  No oral cyananosis.  Neck:  supple,  No JVD  Respiratory: Breath sounds are clear bilaterally, No wheezing, rales or rhonchi  Cardiovascular: S1 and S2, irregular  Gastrointestinal: Bowel Sounds present, soft, nontender.   Extremities:BKA left  Vascular: 1+ peripheral pulses  Neurological: A/O x 3, no focal deficits  Musculoskeletal: no calf tenderness.  Skin: No rashes.      LABS: All Labs Reviewed:                        12.6   5.9   )-----------( 185      ( 18 Aug 2017 06:43 )             40.8                         12.8   7.3   )-----------( 166      ( 17 Aug 2017 16:21 )             39.9     18 Aug 2017 06:43    141    |  104    |  12     ----------------------------<  94     4.2     |  32     |  0.72   17 Aug 2017 16:21    144    |  105    |  12     ----------------------------<  112    4.2     |  35     |  0.82     Ca    8.2        18 Aug 2017 06:43  Ca    8.5        17 Aug 2017 16:21  Mg     1.5       18 Aug 2017 06:43    TPro  6.1    /  Alb  2.5    /  TBili  0.7    /  DBili  x      /  AST  17     /  ALT  16     /  AlkPhos  147    18 Aug 2017 06:43  TPro  5.8    /  Alb  2.6    /  TBili  0.9    /  DBili  x      /  AST  19     /  ALT  19     /  AlkPhos  146    17 Aug 2017 16:21    PT/INR - ( 18 Aug 2017 06:43 )   PT: 14.5 sec;   INR: 1.33 ratio         PTT - ( 18 Aug 2017 06:43 )  PTT:35.6 sec  CARDIAC MARKERS ( 17 Aug 2017 20:02 )  0.016 ng/mL / x     / x     / x     / x      CARDIAC MARKERS ( 17 Aug 2017 16:21 )  <0.015 ng/mL / x     / 46 U/L / x     / x          Blood Culture:   08-17 @ 16:21  Pro Bnp [id number]    08-18 @ 06:43  TSH: 1.260  < from: Xray Chest 1 View AP/PA. (08.17.17 @ 17:43) >  IMPRESSION:  Cardiomegaly but no evidence of active diseases in the lungs.          < end of copied text >      RADIOLOGY/EKG:< from: 12 Lead ECG (08.17.17 @ 16:19) >  Diagnosis Line Atrial fibrillation with rapid ventricular response  Right axis deviation  Right ventricular hypertrophy  Septal infarct , age undetermined  Abnormal ECG  No previous ECGs available  Confirmed by VANGIE SMITH, ANDI ([id number]) on 8/17/2017 11:43:16 PM    < end of copied text >    < from: Transthoracic Echocardiogram (08.18.17 @ 09:47) >  Summary     Moderate (2+) eccentric mitral regurgitation is present.   Normal aortic valve structure and function.   No aortic regurgitation is present.   The left atrium is moderately dilated.   Limited study due to patient being extremely combative   however, overall left ventricular size and systolic function appear   normal. Technically Difficult Study.   Due to the quality of the echocardiogram, a proper assessment of the   right   ventricular contractility (EF) cannot be made.   Trace pericardialeffusion is present.     Signature     ----------------------------------------------------------------   Electronically signed by Ignacia Arthur MD(Interpreting   physician) on 08/18/2017 03:52 PM   ----------------------------------------------------------------    Valves    < end of copied text >
pat seen & examine & full consult dictated.

## 2017-08-21 NOTE — PROGRESS NOTE ADULT - SUBJECTIVE AND OBJECTIVE BOX
Subjective:    pat better, more awake, h/o smoking.    MEDICATIONS  (STANDING):  metoprolol succinate  milliGRAM(s) Oral daily  aspirin enteric coated 81 milliGRAM(s) Oral daily  folic acid 1 milliGRAM(s) Oral daily  multivitamin 1 Tablet(s) Oral daily  nicotine -  14 mG/24Hr(s) Patch 1 Patch Transdermal daily  ALBUTerol    0.083% 2.5 milliGRAM(s) Nebulizer every 6 hours  lactulose Syrup 20 Gram(s) Oral every 6 hours  levoFLOXacin  Tablet 500 milliGRAM(s) Oral every 24 hours  methylPREDNISolone sodium succinate Injectable 40 milliGRAM(s) IV Push every 12 hours    MEDICATIONS  (PRN):  acetaminophen   Tablet 650 milliGRAM(s) Oral every 6 hours PRN For Temp greater than 38 C (100.4 F)  acetaminophen   Tablet. 650 milliGRAM(s) Oral every 6 hours PRN Mild Pain (1 - 3)  senna 2 Tablet(s) Oral at bedtime PRN Constipation  docusate sodium 100 milliGRAM(s) Oral three times a day PRN Constipation  ALBUTerol    0.083% 2.5 milliGRAM(s) Nebulizer every 2 hours PRN Shortness of Breath and/or Wheezing      Allergies    Ceclor (Rash)    Intolerances        Vital Signs Last 24 Hrs  T(C): 36.8 (21 Aug 2017 05:08), Max: 36.9 (20 Aug 2017 20:35)  T(F): 98.3 (21 Aug 2017 05:08), Max: 98.4 (20 Aug 2017 20:35)  HR: 86 (21 Aug 2017 05:08) (72 - 100)  BP: 102/58 (21 Aug 2017 05:08) (87/55 - 109/55)  BP(mean): --  RR: 18 (21 Aug 2017 05:08) (18 - 18)  SpO2: 97% (21 Aug 2017 05:08) (93% - 100%)    PHYSICAL EXAMINATION:    NECK:  Supple. No lymphadenopathy. Jugular venous pressure not elevated. Carotids equal.   HEART:   The cardiac impulse has a normal quality. Reg., Nl S1 and S2.  There are no murmurs, rubs or gallops noted  CHEST:  Chest crackles to auscultation. Normal respiratory effort.  ABDOMEN:  Soft and nontender.   EXTREMITIES:  There is no edema.       LABS:                        12.0   7.1   )-----------( 183      ( 21 Aug 2017 05:40 )             37.5     08-21    135  |  94<L>  |  13  ----------------------------<  92  3.9   |  40<H>  |  0.66    Ca    8.3<L>      21 Aug 2017 05:40  Phos  3.1     08-20  Mg     1.7     08-21      ABG - ( 20 Aug 2017 18:44 )  pH: 7.35  /  pCO2: 84    /  pO2: 75    / HCO3: 45    / Base Excess: 16    /  SaO2: 94

## 2017-08-21 NOTE — PHYSICAL THERAPY INITIAL EVALUATION ADULT - ADDITIONAL COMMENTS
Pt. resides in an apartment w/ no stairs to ambulate. Pt. ambulates w/ a SAC and does not wear O2 at baseline.

## 2017-08-21 NOTE — PROGRESS NOTE ADULT - SUBJECTIVE AND OBJECTIVE BOX
CC- Patient is a 62y old  Male who presents with a chief complaint of Shortness of Breath (17 Aug 2017 20:05)    HPI:  63 y/o M PMHx significant for Afib, chronic EtOH use, (+)smoker, s/p Lt BKA, who was referred to the ED for further evaluation progressive shortness of breath associated w/ anarsarca -> lower extremity edema and groin swelling. In the ED the patient was found to be hypoxic SaO2 91% on RA -> improved to 97% w/ 2L/min via NC, and in AFib w/ RVR. The patient was given Lasix 40mg IV x 1, and Metoprolol 5mg IV x 1. (17 Aug 2017 20:05)    17 pt is drowsy  17 more awake and alert, did not hold BiPAP. On NC but desats when takes off  17 on BiPAP, more awake  17 awake, states cough is better. Denies SOB. Has some wheezing today    Review of system- negative other than HPI    Vital Signs Last 24 Hrs  T(C): 37.1 (21 Aug 2017 10:40), Max: 37.1 (21 Aug 2017 10:40)  T(F): 98.8 (21 Aug 2017 10:40), Max: 98.8 (21 Aug 2017 10:40)  HR: 80 (21 Aug 2017 14:06) (76 - 100)  BP: 98/46 (21 Aug 2017 10:40) (87/55 - 102/58)  BP(mean): --  RR: 18 (21 Aug 2017 10:40) (18 - 18)  SpO2: 92% (21 Aug 2017 10:40) (92% - 97%)    LABS:                        12.0   7.1   )-----------( 183      ( 21 Aug 2017 05:40 )             37.5     21 Aug 2017 05:40    135    |  94     |  13     ----------------------------<  92     3.9     |  40     |  0.66     Ca    8.3        21 Aug 2017 05:40  Phos  3.1       20 Aug 2017 06:18  Mg     1.7       21 Aug 2017 05:40    RADIOLOGY & ADDITIONAL TESTS:  EXAM:  CHEST SINGLE VIEW FRONTAL                        PROCEDURE DATE:  2017    INTERPRETATION:  Portable chest radiograph dated 2017.  COMPARISON: None available.    CLINICAL INFORMATION: Chest pain.  FINDINGS:    The airway is midline.  There are no airspace consolidations.  There is no pleural effusion or pneumothorax.   Cardiomegaly..   The bones are normal.     IMPRESSION:  Cardiomegaly but no evidence of active diseases in the lungs.    EXAM:  CT CHEST                        PROCEDURE DATE:  2017    INTERPRETATION:  HISTORY: Shortness of breath. Evaluate for pneumonia.    TECHNIQUE: A non-contrast CT scan of the chest was performed from the   thoracic inlet to the adrenal glands.  Coronal and sagittal reformatted   images are provided.    COMPARISON: None available.    FINDINGS:   There is motion artifact present which degrades image quality.  Allowing for motion there is no evidence for pneumonia. No suspicious   pulmonary nodule or mass is identified. Linear scarring versus   atelectasis is seen in the right upper lobe. There is a small right   pleural effusion with minimal adjacent compressive atelectasis. A trace   left pleural effusion is identified.Questionable diffuse mild   interstitial thickening.  The trachea and main bronchi are clear. There is no pneumothorax.  The heart is enlarged. There is a trace pericardial effusion. The   thoracic aorta is normal in caliber. The main pulmonary artery is dilated   t  The thyroid gland is unremarkable.  There is no significant axillary, mediastinal, or hilar lymphadenopathy.  The visualized bones do not demonstrate abnormality.  Limited images of the upper abdomen demonstrate nonspecific partially   visualized perinephric stranding. Punctate calcifications scattered   throughout the spleen, likely the sequela prior granulomatous disease.    IMPRESSION:   Motion degraded exam.  No gross evidence for pneumonia.  Cardiomegaly with small right and trace left pleural effusions.   Questionable minimal pulmonary edema.    EXAM:  US COMPLETE ABDOMEN SONOGRAM                        PROCEDURE DATE:  2017    INTERPRETATION:      REASON FOR EXAM:    62-year-old abdominal pain and distention.         TECHNIQUE:   Ultrasound evaluation of the abdomen was performed with   real-time ultrasonography and static grayscale imaging.    Exam is   limited due to patient's body habitus and poor cooperation    COMPARISON:   None.    FINDINGS:    Liver: There is prominent heterogeneous echogenicity of the hepatic   parenchyma with borderline hepatomegaly.  The liver measures 19.5 cm in   length.  There is no intrahepatic biliary ductal dilatation.  Gallbladder: Gallbladder is contracted and is limited in visualization.   There is no evidence of sonographic Milligan's sign.   Common Bile Duct: There is normal size of the common duct The common bile   duct measures 6 mm.  Spleen:  Spleen measures 13 cm.  Pancreas: There is limited visualization of the pancreas as its obscured   by overlying bowel gas but the visualized portions demonstrate no gross   lesions. If additional evaluation is needed consider CT.  Kidneys: There is normal size and echogenicity of the kidneys.  The right   kidney measures 10.9 cm, and the left kidney measures 14  cm in length.   There are no renal calculi. There is no hydronephrosis.  Vessels: There is normal caliber of the visualized abdominal aorta    .   The IVC is patent.    IMPRESSION:  Limited exam  Coarse heterogeneous hepatic parenchyma with  hepatosplenomegaly.  Contracted gallbladder.    PHYSICAL EXAM:  GENERAL: ill-looking  HEAD:  Atraumatic, Normocephalic  EYES: EOMI, PERRLA, conjunctiva and sclera clear  HEENT: Moist mucous membranes  NECK: Supple, No JVD  NERVOUS SYSTEM:  Awake and responsive  CHEST/LUNG: b/l scattered wheezing  HEART: Regular rate and rhythm; No murmurs, rubs, or gallops  ABDOMEN: Soft, Nontender, Nondistended; Bowel sounds present  GENITOURINARY- Voiding, no palpable bladder  EXTREMITIES:  RLE swelling improving, LT- BKA  MUSCULOSKELTAL- No muscle tenderness, Muscle tone normal, No joint tenderness, no Joint swelling, Joint range of motion-normal  SKIN-no rash, no lesion    Daily Height in cm: 187.96 (17 Aug 2017 21:57)    Daily Weight in k.7 (18 Aug 2017 12:10)    acetaminophen   Tablet 650 milliGRAM(s) Oral every 6 hours PRN  acetaminophen   Tablet. 650 milliGRAM(s) Oral every 6 hours PRN  senna 2 Tablet(s) Oral at bedtime PRN  docusate sodium 100 milliGRAM(s) Oral three times a day PRN  metoprolol succinate  milliGRAM(s) Oral daily  aspirin enteric coated 81 milliGRAM(s) Oral daily  folic acid 1 milliGRAM(s) Oral daily  multivitamin 1 Tablet(s) Oral daily  thiamine 100 milliGRAM(s) Oral daily  nicotine -  14 mG/24Hr(s) Patch 1 Patch Transdermal daily  furosemide   Injectable 40 milliGRAM(s) IV Push two times a day  ALBUTerol    0.083% 2.5 milliGRAM(s) Nebulizer every 6 hours  ALBUTerol    0.083% 2.5 milliGRAM(s) Nebulizer every 2 hours PRN  levoFLOXacin IVPB 500 milliGRAM(s) IV Intermittent once  levoFLOXacin IVPB   IV Intermittent   lactulose Syrup 20 Gram(s) Oral three times a day  magnesium oxide 400 milliGRAM(s) Oral two times a day with meals      Assessment/Plan  #CHF exacerbation  Card elsy Hein appreciated  Switch to PO lasix  ECHO reviewed, unable to estimate EF  Monitor lytes    #Encephalopathy likely metabolic- improving  Elevated ammonia- ?hepatic encephalopathy  Monitor ammonia level- improving  Abd sono- reviewed  GI elsy Tyson appreciated  DC DIWA protocol- not in withdrawal    #COPD exacerbation/chronic vs acute on chronic bronchitis/VIJAY  Started on IV steroids  PO Levaquin  Pulm and ID eval appreciated  Nebs  CT chest w/o contrast reviewed, no pneumonia  Patient states he had sleep studies done as outpatient and had BiPAP machine at home, but was not using it and does not know where it is. Daughter is questioning how to get    #Chronic afib  Cont BB for rate control  Cont aspirin  Card elsy Hein appreciated    #Hypomagnesemia- replete    #Dispo- can transfer to med-surg

## 2017-08-21 NOTE — PHYSICAL THERAPY INITIAL EVALUATION ADULT - ACTIVE RANGE OF MOTION EXAMINATION, REHAB EVAL
bilateral lower extremity Active ROM was WNL (within normal limits)/natasha. upper extremity Active ROM was WNL (within normal limits)

## 2017-08-21 NOTE — PROGRESS NOTE ADULT - ASSESSMENT
PROBLEMS;    Ac on chronic hypercapnic & hypoxamic respiratory failure  OHS/VIJAY  CHF exacerbation  Encephalopathy likely metabolic/hepatic-Elevated ammonia  COPD/chronic vs acute on chronic bronchitis  Chronic afib    PLAN;    IV LEVAQUIN  TRIAL OF SOLUMEDROLS  BIPAP AS TOLERATED  MONITOR AMMONIA LEVEL  SUPPORTIVE CARE  DVT PROPHYLASIX

## 2017-08-21 NOTE — CONSULT NOTE ADULT - ASSESSMENT
Imp:  some evidence of liver disease including high alk phos, ammonia  No evidence of hepatic enceph now    Rec:  Check abdominal ultrasound  Mostly will need outpatient follow up for additional workup including elastography, endoscopy, colonoscopy etc.

## 2017-08-22 LAB
AMMONIA BLD-MCNC: 46 UMOL/L — HIGH (ref 11–32)
ANION GAP SERPL CALC-SCNC: 5 MMOL/L — SIGNIFICANT CHANGE UP (ref 5–17)
BASE EXCESS BLDA CALC-SCNC: 8 MMOL/L — HIGH (ref -2–2)
BLOOD GAS COMMENTS ARTERIAL: SIGNIFICANT CHANGE UP
BUN SERPL-MCNC: 20 MG/DL — SIGNIFICANT CHANGE UP (ref 7–23)
CALCIUM SERPL-MCNC: 8.9 MG/DL — SIGNIFICANT CHANGE UP (ref 8.5–10.1)
CHLORIDE SERPL-SCNC: 95 MMOL/L — LOW (ref 96–108)
CO2 SERPL-SCNC: 38 MMOL/L — HIGH (ref 22–31)
CREAT SERPL-MCNC: 0.72 MG/DL — SIGNIFICANT CHANGE UP (ref 0.5–1.3)
GAS PNL BLDA: SIGNIFICANT CHANGE UP
GLUCOSE SERPL-MCNC: 196 MG/DL — HIGH (ref 70–99)
HCO3 BLDA-SCNC: 34 MMOL/L — HIGH (ref 21–29)
PCO2 BLDA: 52 MMHG — HIGH (ref 32–46)
PH BLDA: 7.42 — SIGNIFICANT CHANGE UP (ref 7.35–7.45)
PO2 BLDA: 80 — SIGNIFICANT CHANGE UP
POTASSIUM SERPL-MCNC: 3.9 MMOL/L — SIGNIFICANT CHANGE UP (ref 3.5–5.3)
POTASSIUM SERPL-SCNC: 3.9 MMOL/L — SIGNIFICANT CHANGE UP (ref 3.5–5.3)
SAO2 % BLDA: 95 — SIGNIFICANT CHANGE UP
SODIUM SERPL-SCNC: 138 MMOL/L — SIGNIFICANT CHANGE UP (ref 135–145)

## 2017-08-22 RX ADMIN — Medication 81 MILLIGRAM(S): at 11:13

## 2017-08-22 RX ADMIN — ALBUTEROL 2.5 MILLIGRAM(S): 90 AEROSOL, METERED ORAL at 14:27

## 2017-08-22 RX ADMIN — Medication 1 PATCH: at 11:14

## 2017-08-22 RX ADMIN — Medication 1 MILLIGRAM(S): at 11:13

## 2017-08-22 RX ADMIN — Medication 40 MILLIGRAM(S): at 05:41

## 2017-08-22 RX ADMIN — ALBUTEROL 2.5 MILLIGRAM(S): 90 AEROSOL, METERED ORAL at 19:40

## 2017-08-22 RX ADMIN — ALBUTEROL 2.5 MILLIGRAM(S): 90 AEROSOL, METERED ORAL at 07:40

## 2017-08-22 RX ADMIN — Medication 1 PATCH: at 11:19

## 2017-08-22 RX ADMIN — Medication 200 MILLIGRAM(S): at 05:40

## 2017-08-22 RX ADMIN — Medication 40 MILLIGRAM(S): at 17:54

## 2017-08-22 RX ADMIN — LACTULOSE 20 GRAM(S): 10 SOLUTION ORAL at 13:47

## 2017-08-22 RX ADMIN — ALBUTEROL 2.5 MILLIGRAM(S): 90 AEROSOL, METERED ORAL at 01:55

## 2017-08-22 RX ADMIN — LACTULOSE 20 GRAM(S): 10 SOLUTION ORAL at 20:18

## 2017-08-22 RX ADMIN — LACTULOSE 20 GRAM(S): 10 SOLUTION ORAL at 08:10

## 2017-08-22 RX ADMIN — Medication 1 TABLET(S): at 11:13

## 2017-08-22 RX ADMIN — Medication 40 MILLIGRAM(S): at 05:40

## 2017-08-22 NOTE — PROGRESS NOTE ADULT - SUBJECTIVE AND OBJECTIVE BOX
CC- Patient is a 62y old  Male who presents with a chief complaint of Shortness of Breath (17 Aug 2017 20:05)    HPI:  63 y/o M PMHx significant for Afib, chronic EtOH use, (+)smoker, s/p Lt BKA, who was referred to the ED for further evaluation progressive shortness of breath associated w/ anarsarca -> lower extremity edema and groin swelling. In the ED the patient was found to be hypoxic SaO2 91% on RA -> improved to 97% w/ 2L/min via NC, and in AFib w/ RVR. The patient was given Lasix 40mg IV x 1, and Metoprolol 5mg IV x 1. (17 Aug 2017 20:05)    17 pt is drowsy  17 more awake and alert, did not hold BiPAP. On NC but desats when takes off  17 on BiPAP, more awake  17 awake, states cough is better. Denies SOB. Has some wheezing today  17 doing good, denies SOB, denies cough    Review of system- negative other than HPI    Vital Signs Last 24 Hrs  T(C): 36.8 (22 Aug 2017 11:18), Max: 36.8 (22 Aug 2017 11:18)  T(F): 98.3 (22 Aug 2017 11:18), Max: 98.3 (22 Aug 2017 11:18)  HR: 78 (22 Aug 2017 14:25) (70 - 122)  BP: 108/92 (22 Aug 2017 11:18) (108/92 - 130/71)  BP(mean): --  RR: 18 (22 Aug 2017 11:18) (17 - 18)  SpO2: 100% (22 Aug 2017 11:18) (95% - 100%)    LABS:                                   12.0   7.1   )-----------( 183      ( 21 Aug 2017 05:40 )             37.5     22 Aug 2017 10:12    138    |  95     |  20     ----------------------------<  196    3.9     |  38     |  0.72     Ca    8.9        22 Aug 2017 10:12  Mg     1.7       21 Aug 2017 05:40    RADIOLOGY & ADDITIONAL TESTS:  EXAM:  CHEST SINGLE VIEW FRONTAL                        PROCEDURE DATE:  2017    INTERPRETATION:  Portable chest radiograph dated 2017.  COMPARISON: None available.    CLINICAL INFORMATION: Chest pain.  FINDINGS:    The airway is midline.  There are no airspace consolidations.  There is no pleural effusion or pneumothorax.   Cardiomegaly..   The bones are normal.     IMPRESSION:  Cardiomegaly but no evidence of active diseases in the lungs.    EXAM:  CT CHEST                        PROCEDURE DATE:  2017    INTERPRETATION:  HISTORY: Shortness of breath. Evaluate for pneumonia.    TECHNIQUE: A non-contrast CT scan of the chest was performed from the   thoracic inlet to the adrenal glands.  Coronal and sagittal reformatted   images are provided.    COMPARISON: None available.    FINDINGS:   There is motion artifact present which degrades image quality.  Allowing for motion there is no evidence for pneumonia. No suspicious   pulmonary nodule or mass is identified. Linear scarring versus   atelectasis is seen in the right upper lobe. There is a small right   pleural effusion with minimal adjacent compressive atelectasis. A trace   left pleural effusion is identified.Questionable diffuse mild   interstitial thickening.  The trachea and main bronchi are clear. There is no pneumothorax.  The heart is enlarged. There is a trace pericardial effusion. The   thoracic aorta is normal in caliber. The main pulmonary artery is dilated   t  The thyroid gland is unremarkable.  There is no significant axillary, mediastinal, or hilar lymphadenopathy.  The visualized bones do not demonstrate abnormality.  Limited images of the upper abdomen demonstrate nonspecific partially   visualized perinephric stranding. Punctate calcifications scattered   throughout the spleen, likely the sequela prior granulomatous disease.    IMPRESSION:   Motion degraded exam.  No gross evidence for pneumonia.  Cardiomegaly with small right and trace left pleural effusions.   Questionable minimal pulmonary edema.    EXAM:  US COMPLETE ABDOMEN SONOGRAM                        PROCEDURE DATE:  2017    INTERPRETATION:      REASON FOR EXAM:    62-year-old abdominal pain and distention.         TECHNIQUE:   Ultrasound evaluation of the abdomen was performed with   real-time ultrasonography and static grayscale imaging.    Exam is   limited due to patient's body habitus and poor cooperation    COMPARISON:   None.    FINDINGS:    Liver: There is prominent heterogeneous echogenicity of the hepatic   parenchyma with borderline hepatomegaly.  The liver measures 19.5 cm in   length.  There is no intrahepatic biliary ductal dilatation.  Gallbladder: Gallbladder is contracted and is limited in visualization.   There is no evidence of sonographic Milligan's sign.   Common Bile Duct: There is normal size of the common duct The common bile   duct measures 6 mm.  Spleen:  Spleen measures 13 cm.  Pancreas: There is limited visualization of the pancreas as its obscured   by overlying bowel gas but the visualized portions demonstrate no gross   lesions. If additional evaluation is needed consider CT.  Kidneys: There is normal size and echogenicity of the kidneys.  The right   kidney measures 10.9 cm, and the left kidney measures 14  cm in length.   There are no renal calculi. There is no hydronephrosis.  Vessels: There is normal caliber of the visualized abdominal aorta    .   The IVC is patent.    IMPRESSION:  Limited exam  Coarse heterogeneous hepatic parenchyma with  hepatosplenomegaly.  Contracted gallbladder.    PHYSICAL EXAM:  GENERAL: looks OK  HEAD:  Atraumatic, Normocephalic  EYES: EOMI, PERRLA, conjunctiva and sclera clear  HEENT: Moist mucous membranes  NECK: Supple, No JVD  NERVOUS SYSTEM:  Awake and responsive  CHEST/LUNG: clear b/l  HEART: Regular rate and rhythm; No murmurs, rubs, or gallops  ABDOMEN: Soft, Nontender, Nondistended; Bowel sounds present  GENITOURINARY- Voiding, no palpable bladder  EXTREMITIES:  RLE swelling improving, LT- BKA  MUSCULOSKELTAL- No muscle tenderness, Muscle tone normal, No joint tenderness, no Joint swelling, Joint range of motion-normal  SKIN-no rash, no lesion    Daily Height in cm: 187.96 (17 Aug 2017 21:57)    Daily Weight in k.7 (18 Aug 2017 12:10)    acetaminophen   Tablet 650 milliGRAM(s) Oral every 6 hours PRN  acetaminophen   Tablet. 650 milliGRAM(s) Oral every 6 hours PRN  senna 2 Tablet(s) Oral at bedtime PRN  docusate sodium 100 milliGRAM(s) Oral three times a day PRN  metoprolol succinate  milliGRAM(s) Oral daily  aspirin enteric coated 81 milliGRAM(s) Oral daily  folic acid 1 milliGRAM(s) Oral daily  multivitamin 1 Tablet(s) Oral daily  thiamine 100 milliGRAM(s) Oral daily  nicotine -  14 mG/24Hr(s) Patch 1 Patch Transdermal daily  furosemide   Injectable 40 milliGRAM(s) IV Push two times a day  ALBUTerol    0.083% 2.5 milliGRAM(s) Nebulizer every 6 hours  ALBUTerol    0.083% 2.5 milliGRAM(s) Nebulizer every 2 hours PRN  levoFLOXacin IVPB 500 milliGRAM(s) IV Intermittent once  levoFLOXacin IVPB   IV Intermittent   lactulose Syrup 20 Gram(s) Oral three times a day  magnesium oxide 400 milliGRAM(s) Oral two times a day with meals      Assessment/Plan  #CHF exacerbation- acute component resolved  Card elsy Hein appreciated  Switched to PO lasix  ECHO reviewed, unable to estimate EF  Monitor lytes    #Encephalopathy likely metabolic- resolved  Elevated ammonia- ?hepatic encephalopathy  Monitor ammonia level- improving  Abd sono- reviewed  GI eval DR Tyson appreciated  DC DIWA protocol- not in withdrawal    #COPD exacerbation/chronic vs acute on chronic bronchitis/VIJAY  Started on IV steroids- will switch to PO Prednisone in am, taper over short time period  PO Levaquin completed  Pulm and ID eval appreciated  Nebs  CT chest w/o contrast reviewed, no pneumonia  Patient states he had sleep studies done as outpatient and had BiPAP machine at home, but was not using it and does not know where it is. Daughter is questioning how to get. Patient instructed to f/u with DR Lane as outpatient to set up for BiPAP machine at home    #Chronic afib  Cont BB for rate control  Cont aspirin  Card eval DR Hein appreciated    #Hypomagnesemia- replete    #Dispo- ambulate, likely home in am

## 2017-08-22 NOTE — PROGRESS NOTE ADULT - SUBJECTIVE AND OBJECTIVE BOX
Subjective:    pat much better, more awake, no respiratory distress.    MEDICATIONS  (STANDING):  metoprolol succinate  milliGRAM(s) Oral daily  aspirin enteric coated 81 milliGRAM(s) Oral daily  folic acid 1 milliGRAM(s) Oral daily  multivitamin 1 Tablet(s) Oral daily  nicotine -  14 mG/24Hr(s) Patch 1 Patch Transdermal daily  ALBUTerol    0.083% 2.5 milliGRAM(s) Nebulizer every 6 hours  lactulose Syrup 20 Gram(s) Oral every 6 hours  furosemide    Tablet 40 milliGRAM(s) Oral daily  methylPREDNISolone sodium succinate Injectable 40 milliGRAM(s) IV Push every 12 hours    MEDICATIONS  (PRN):  acetaminophen   Tablet 650 milliGRAM(s) Oral every 6 hours PRN For Temp greater than 38 C (100.4 F)  acetaminophen   Tablet. 650 milliGRAM(s) Oral every 6 hours PRN Mild Pain (1 - 3)  senna 2 Tablet(s) Oral at bedtime PRN Constipation  docusate sodium 100 milliGRAM(s) Oral three times a day PRN Constipation  ALBUTerol    0.083% 2.5 milliGRAM(s) Nebulizer every 2 hours PRN Shortness of Breath and/or Wheezing      Allergies    Ceclor (Rash)    Intolerances        Vital Signs Last 24 Hrs  T(C): 36.6 (22 Aug 2017 04:51), Max: 37.1 (21 Aug 2017 10:40)  T(F): 97.9 (22 Aug 2017 04:51), Max: 98.8 (21 Aug 2017 10:40)  HR: 80 (22 Aug 2017 08:04) (80 - 122)  BP: 115/76 (22 Aug 2017 04:51) (98/46 - 115/76)  BP(mean): --  RR: 18 (22 Aug 2017 04:51) (18 - 18)  SpO2: 95% (22 Aug 2017 04:51) (92% - 97%)    PHYSICAL EXAMINATION:    NECK:  Supple. No lymphadenopathy. Jugular venous pressure not elevated. Carotids equal.   HEART:   The cardiac impulse has a normal quality. Reg., Nl S1 and S2.  There are no murmurs, rubs or gallops noted  CHEST:  Chest crackles to auscultation. Normal respiratory effort.  ABDOMEN:  Soft and nontender.   EXTREMITIES:  There is no edema.       LABS:                        12.0   7.1   )-----------( 183      ( 21 Aug 2017 05:40 )             37.5     08-21    135  |  94<L>  |  13  ----------------------------<  92  3.9   |  40<H>  |  0.66    Ca    8.3<L>      21 Aug 2017 05:40  Mg     1.7     08-21

## 2017-08-22 NOTE — PROGRESS NOTE ADULT - ASSESSMENT
PROBLEMS;    Ac on chronic hypercapnic & hypoxamic respiratory failure  OHS/VIJAY  CHF exacerbation  Encephalopathy likely metabolic/hepatic-Elevated ammonia  COPD/chronic vs acute on chronic bronchitis  Chronic afib    PLAN;    ABG today  IV LEVAQUIN  IV SOLUMEDROLS 40mg q12hr  BIPAP AS TOLERATED  MONITOR AMMONIA LEVEL  SUPPORTIVE CARE  DVT PROPHYLASIX

## 2017-08-23 ENCOUNTER — TRANSCRIPTION ENCOUNTER (OUTPATIENT)
Age: 62
End: 2017-08-23

## 2017-08-23 VITALS
SYSTOLIC BLOOD PRESSURE: 111 MMHG | DIASTOLIC BLOOD PRESSURE: 67 MMHG | OXYGEN SATURATION: 95 % | HEART RATE: 78 BPM | RESPIRATION RATE: 17 BRPM | TEMPERATURE: 98 F

## 2017-08-23 LAB
CULTURE RESULTS: SIGNIFICANT CHANGE UP
CULTURE RESULTS: SIGNIFICANT CHANGE UP
SPECIMEN SOURCE: SIGNIFICANT CHANGE UP
SPECIMEN SOURCE: SIGNIFICANT CHANGE UP

## 2017-08-23 RX ORDER — ALBUTEROL 90 UG/1
2 AEROSOL, METERED ORAL
Qty: 1 | Refills: 2 | OUTPATIENT
Start: 2017-08-23 | End: 2017-11-20

## 2017-08-23 RX ORDER — NICOTINE POLACRILEX 2 MG
1 GUM BUCCAL
Qty: 30 | Refills: 0 | OUTPATIENT
Start: 2017-08-23 | End: 2017-09-22

## 2017-08-23 RX ORDER — BUDESONIDE AND FORMOTEROL FUMARATE DIHYDRATE 160; 4.5 UG/1; UG/1
2 AEROSOL RESPIRATORY (INHALATION)
Qty: 1 | Refills: 0 | OUTPATIENT
Start: 2017-08-23 | End: 2017-09-22

## 2017-08-23 RX ADMIN — LACTULOSE 20 GRAM(S): 10 SOLUTION ORAL at 01:35

## 2017-08-23 RX ADMIN — Medication 81 MILLIGRAM(S): at 11:27

## 2017-08-23 RX ADMIN — ALBUTEROL 2.5 MILLIGRAM(S): 90 AEROSOL, METERED ORAL at 13:52

## 2017-08-23 RX ADMIN — Medication 1 PATCH: at 11:28

## 2017-08-23 RX ADMIN — LACTULOSE 20 GRAM(S): 10 SOLUTION ORAL at 07:23

## 2017-08-23 RX ADMIN — Medication 30 MILLIGRAM(S): at 05:23

## 2017-08-23 RX ADMIN — ALBUTEROL 2.5 MILLIGRAM(S): 90 AEROSOL, METERED ORAL at 08:01

## 2017-08-23 RX ADMIN — LACTULOSE 20 GRAM(S): 10 SOLUTION ORAL at 14:05

## 2017-08-23 RX ADMIN — Medication 40 MILLIGRAM(S): at 05:23

## 2017-08-23 RX ADMIN — Medication 1 MILLIGRAM(S): at 11:27

## 2017-08-23 RX ADMIN — Medication 200 MILLIGRAM(S): at 05:23

## 2017-08-23 RX ADMIN — Medication 1 PATCH: at 11:27

## 2017-08-23 RX ADMIN — Medication 1 TABLET(S): at 11:27

## 2017-08-23 NOTE — DISCHARGE NOTE ADULT - PATIENT PORTAL LINK FT
“You can access the [employer] Patient Portal, offered by [facility], by registering with the following website: [email or web address]”

## 2017-08-23 NOTE — DISCHARGE NOTE ADULT - CARE PROVIDER_API CALL
Ezra Lane), Critical Care Medicine; Internal Medicine; Pulmonary Disease; Sleep Medicine  [address] [place] [address]  Phone: [phone]  Fax: [phone]

## 2017-08-23 NOTE — DISCHARGE NOTE ADULT - HOSPITAL COURSE
61 y/o M PMHx significant for Afib, chronic EtOH use, (+)smoker, s/p Lt BKA, who was referred to the ED for further evaluation progressive shortness of breath associated w/ anarsarca -> lower extremity edema and groin swelling. In the ED the patient was found to be hypoxic SaO2 91% on RA -> improved to 97% w/ 2L/min via NC, and in AFib w/ RVR. The patient was given Lasix 40mg IV x 1, and Metoprolol 5mg IV x 1. (17 Aug 2017 20:05)    8/18/17 pt is drowsy  8/19/17 more awake and alert, did not hold BiPAP. On NC but desats when takes off  8/20/17 on BiPAP, more awake  8/21/17 awake, states cough is better. Denies SOB. Has some wheezing today  8/22/17 doing good, denies SOB, denies cough  8/23: No O/N events. No new complaints. Ambulated w/o O2, SpO2 95%. Eager to go home.     PHYSICAL EXAM:  GENERAL: looks OK  HEAD:  Atraumatic, Normocephalic  EYES: EOMI, PERRLA, conjunctiva and sclera clear  HEENT: Moist mucous membranes  NECK: Supple, No JVD  NERVOUS SYSTEM:  Awake and responsive  CHEST/LUNG: clear b/l  HEART: Regular rate and rhythm; No murmurs, rubs, or gallops  ABDOMEN: Soft, Nontender, Nondistended; Bowel sounds present  GENITOURINARY- Voiding, no palpable bladder  EXTREMITIES:  RLE swelling improving, LT- BKA  MUSCULOSKELTAL- No muscle tenderness, Muscle tone normal, No joint tenderness, no Joint swelling, Joint range of motion-normal  SKIN-no rash, no lesion    T(C): 36.7 (08-23-17 @ 11:23), Max: 36.9 (08-22-17 @ 21:17)  HR: 78 (08-23-17 @ 11:23) (78 - 93)  BP: 111/67 (08-23-17 @ 11:23) (111/67 - 129/65)  RR: 17 (08-23-17 @ 11:23) (17 - 18)  SpO2: 95% (08-23-17 @ 11:23) (92% - 95%)  Wt(kg): --    22 Aug 2017 10:12    138    |  95     |  20     ----------------------------<  196    3.9     |  38     |  0.72     Ca    8.9        22 Aug 2017 10:12    Assessment/Plan  # Aute on chronic joanna-CHF- resolved  - c/w PO Lasix   - low Na diet  - monitor weight    #Encephalopathy likely metabolic- resolved  Elevated ammonia- ?hepatic encephalopathy  Monitor ammonia level- improving  Abd sono- reviewed  GI elsy Tyson appreciated  DC ISAAK protocol- not in withdrawal    #COPD exacerbation/chronic vs acute on chronic bronchitis/VIJAY  - prednisone taper  - start high-dose Symbicort  - albuterol HFA PRN  - quit smoking- Zyban, accupuncture/pressure/auriculotherapy are all reasonable options    #Chronic afib  Cont BB for rate control  Cont aspirin  Card elsy Hein appreciated    #Hypomagnesemia- replete

## 2017-08-23 NOTE — DISCHARGE NOTE ADULT - MEDICATION SUMMARY - MEDICATIONS TO TAKE
I will START or STAY ON the medications listed below when I get home from the hospital:    predniSONE 10 mg oral tablet  -- 3 tab(s) by mouth once a day x 2 days  2 tab(s) by mouth once a day x 2 days  1 tab(s) by mouth once a day x 2 days  -- Indication: For COPD exacerbation    aspirin 81 mg oral tablet  -- 1 tab(s) by mouth once a day  -- Indication: For primary prevention    traZODone 50 mg oral tablet  -- 2 tab(s) by mouth once a day (at bedtime), As Needed sleep  -- Indication: For insomnia    Toprol- mg oral tablet, extended release  -- 2 tab(s) by mouth once a day  -- Indication: For HTN    Symbicort 160 mcg-4.5 mcg/inh inhalation aerosol  -- 2 puff(s) inhaled 2 times a day  -- Check with your doctor before becoming pregnant.  For inhalation only.  Rinse mouth thoroughly after use.    -- Indication: For COPD    ProAir HFA 90 mcg/inh inhalation aerosol  -- 2 puff(s) inhaled 4 times a day  -- For inhalation only.  It is very important that you take or use this exactly as directed.  Do not skip doses or discontinue unless directed by your doctor.  Obtain medical advice before taking any non-prescription drugs as some may affect the action of this medication.  Shake well before use.    -- Indication: For COPD    nicotine 14 mg/24 hr transdermal film, extended release  -- 1 patch by transdermal patch once a day  -- Indication: For tobacco abuse    Multiple Vitamins oral tablet  -- 1 tab(s) by mouth once a day  -- Indication: For general health     ergocalciferol 50,000 intl units (1.25 mg) oral capsule  -- 1 cap(s) by mouth once a week  -- Indication: For general health

## 2017-08-23 NOTE — DISCHARGE NOTE ADULT - PLAN OF CARE
resolve and prevent complications - taper prednisone slowly over the next 6 days  - start Symbicort twice daily, and albuterol as needed 3-4x daily for acute symptoms  - attempt to quit smoking- agree with trial of bupropion, anticipate 2-3 months use in order to assess efficacy  - followup with Dr. Lane in 1-2 weeks for pulmonary function testing and polysomnography quit smoking - trial of bupropion as above  - consider alternative therapies such as accupuncture/pressure, meditation, auriculotherapy

## 2017-08-23 NOTE — DISCHARGE NOTE ADULT - CARE PLAN
Principal Discharge DX:	COPD exacerbation  Goal:	resolve and prevent complications  Instructions for follow-up, activity and diet:	- taper prednisone slowly over the next 6 days  - start Symbicort twice daily, and albuterol as needed 3-4x daily for acute symptoms  - attempt to quit smoking- agree with trial of bupropion, anticipate 2-3 months use in order to assess efficacy  - followup with Dr. Lane in 1-2 weeks for pulmonary function testing and polysomnography  Secondary Diagnosis:	Tobacco abuse  Goal:	quit smoking  Instructions for follow-up, activity and diet:	- trial of bupropion as above  - consider alternative therapies such as accupuncture/pressure, meditation, auriculotherapy

## 2017-08-23 NOTE — PROGRESS NOTE ADULT - ASSESSMENT
PROBLEMS;    Ac on chronic hypercapnic & hypoxamic respiratory failure  OHS/VIJAY  CHF exacerbation  Encephalopathy likely metabolic/hepatic-Elevated ammonia  COPD/chronic vs acute on chronic bronchitis  Chronic afib    PLAN;    ABG reviewed  IV LEVAQUIN  TAPER IV SOLUMEDROLS 40mg q12hr  BIPAP AS TOLERATED  SUPPORTIVE CARE  DVT PROPHYLASIX

## 2017-08-23 NOTE — PROGRESS NOTE ADULT - SUBJECTIVE AND OBJECTIVE BOX
Subjective:    pat better, sitting in chair.    MEDICATIONS  (STANDING):  metoprolol succinate  milliGRAM(s) Oral daily  aspirin enteric coated 81 milliGRAM(s) Oral daily  folic acid 1 milliGRAM(s) Oral daily  multivitamin 1 Tablet(s) Oral daily  nicotine -  14 mG/24Hr(s) Patch 1 Patch Transdermal daily  ALBUTerol    0.083% 2.5 milliGRAM(s) Nebulizer every 6 hours  lactulose Syrup 20 Gram(s) Oral every 6 hours  furosemide    Tablet 40 milliGRAM(s) Oral daily  predniSONE   Tablet 30 milliGRAM(s) Oral daily    MEDICATIONS  (PRN):  acetaminophen   Tablet 650 milliGRAM(s) Oral every 6 hours PRN For Temp greater than 38 C (100.4 F)  acetaminophen   Tablet. 650 milliGRAM(s) Oral every 6 hours PRN Mild Pain (1 - 3)  senna 2 Tablet(s) Oral at bedtime PRN Constipation  docusate sodium 100 milliGRAM(s) Oral three times a day PRN Constipation  ALBUTerol    0.083% 2.5 milliGRAM(s) Nebulizer every 2 hours PRN Shortness of Breath and/or Wheezing      Allergies    Ceclor (Rash)    Intolerances        Vital Signs Last 24 Hrs  T(C): 36.7 (23 Aug 2017 05:25), Max: 36.9 (22 Aug 2017 21:17)  T(F): 98.1 (23 Aug 2017 05:25), Max: 98.4 (22 Aug 2017 21:17)  HR: 93 (23 Aug 2017 05:25) (70 - 103)  BP: 113/59 (23 Aug 2017 05:25) (108/92 - 130/71)  BP(mean): --  RR: 18 (23 Aug 2017 05:25) (17 - 18)  SpO2: 95% (23 Aug 2017 05:25) (92% - 100%)    PHYSICAL EXAMINATION:    NECK:  Supple. No lymphadenopathy. Jugular venous pressure not elevated. Carotids equal.   HEART:   The cardiac impulse has a normal quality. Reg., Nl S1 and S2.  There are no murmurs, rubs or gallops noted  CHEST:  Chest is clear to auscultation. Normal respiratory effort.  ABDOMEN:  Soft and nontender.   EXTREMITIES:  There is no edema.       LABS:    08-22    138  |  95<L>  |  20  ----------------------------<  196<H>  3.9   |  38<H>  |  0.72    Ca    8.9      22 Aug 2017 10:12      ABG - ( 22 Aug 2017 11:12 )  pH: 7.42  /  pCO2: 52    /  pO2: 80    / HCO3: 34    / Base Excess: 8     /  SaO2: 95

## 2017-08-28 ENCOUNTER — APPOINTMENT (OUTPATIENT)
Dept: FAMILY MEDICINE | Facility: CLINIC | Age: 62
End: 2017-08-28
Payer: MEDICARE

## 2017-08-28 VITALS
WEIGHT: 250 LBS | TEMPERATURE: 99.3 F | DIASTOLIC BLOOD PRESSURE: 78 MMHG | OXYGEN SATURATION: 95 % | SYSTOLIC BLOOD PRESSURE: 130 MMHG | HEIGHT: 72 IN | HEART RATE: 118 BPM | BODY MASS INDEX: 33.86 KG/M2

## 2017-08-28 DIAGNOSIS — R06.02 SHORTNESS OF BREATH: ICD-10-CM

## 2017-08-28 DIAGNOSIS — J96.21 ACUTE AND CHRONIC RESPIRATORY FAILURE WITH HYPOXIA: ICD-10-CM

## 2017-08-28 DIAGNOSIS — I50.33 ACUTE ON CHRONIC DIASTOLIC (CONGESTIVE) HEART FAILURE: ICD-10-CM

## 2017-08-28 DIAGNOSIS — J20.9 ACUTE BRONCHITIS, UNSPECIFIED: ICD-10-CM

## 2017-08-28 DIAGNOSIS — G93.41 METABOLIC ENCEPHALOPATHY: ICD-10-CM

## 2017-08-28 DIAGNOSIS — Z89.512 ACQUIRED ABSENCE OF LEFT LEG BELOW KNEE: ICD-10-CM

## 2017-08-28 DIAGNOSIS — Z88.8 ALLERGY STATUS TO OTHER DRUGS, MEDICAMENTS AND BIOLOGICAL SUBSTANCES: ICD-10-CM

## 2017-08-28 DIAGNOSIS — I48.2 CHRONIC ATRIAL FIBRILLATION: ICD-10-CM

## 2017-08-28 DIAGNOSIS — F10.10 ALCOHOL ABUSE, UNCOMPLICATED: ICD-10-CM

## 2017-08-28 DIAGNOSIS — Z79.899 OTHER LONG TERM (CURRENT) DRUG THERAPY: ICD-10-CM

## 2017-08-28 DIAGNOSIS — J44.1 CHRONIC OBSTRUCTIVE PULMONARY DISEASE WITH (ACUTE) EXACERBATION: ICD-10-CM

## 2017-08-28 DIAGNOSIS — G47.33 OBSTRUCTIVE SLEEP APNEA (ADULT) (PEDIATRIC): ICD-10-CM

## 2017-08-28 DIAGNOSIS — F17.210 NICOTINE DEPENDENCE, CIGARETTES, UNCOMPLICATED: ICD-10-CM

## 2017-08-28 DIAGNOSIS — E83.42 HYPOMAGNESEMIA: ICD-10-CM

## 2017-08-28 PROCEDURE — 99496 TRANSJ CARE MGMT HIGH F2F 7D: CPT

## 2017-08-30 ENCOUNTER — RX RENEWAL (OUTPATIENT)
Age: 62
End: 2017-08-30

## 2017-09-28 ENCOUNTER — APPOINTMENT (OUTPATIENT)
Dept: FAMILY MEDICINE | Facility: CLINIC | Age: 62
End: 2017-09-28
Payer: MEDICARE

## 2017-09-28 VITALS
HEART RATE: 77 BPM | OXYGEN SATURATION: 95 % | SYSTOLIC BLOOD PRESSURE: 130 MMHG | DIASTOLIC BLOOD PRESSURE: 78 MMHG | BODY MASS INDEX: 30.48 KG/M2 | WEIGHT: 225 LBS | HEIGHT: 72 IN

## 2017-09-28 DIAGNOSIS — R29.898 OTHER SYMPTOMS AND SIGNS INVOLVING THE MUSCULOSKELETAL SYSTEM: ICD-10-CM

## 2017-09-28 DIAGNOSIS — Z60.2 PROBLEMS RELATED TO LIVING ALONE: ICD-10-CM

## 2017-09-28 PROCEDURE — 99212 OFFICE O/P EST SF 10 MIN: CPT

## 2017-09-28 PROCEDURE — 99496 TRANSJ CARE MGMT HIGH F2F 7D: CPT

## 2017-09-28 RX ORDER — NICOTINE 21 MG/24HR
14 PATCH, TRANSDERMAL 24 HOURS TRANSDERMAL
Qty: 30 | Refills: 1 | Status: DISCONTINUED | COMMUNITY
Start: 2017-02-06 | End: 2017-09-28

## 2017-09-28 SDOH — SOCIAL STABILITY - SOCIAL INSECURITY: PROBLEMS RELATED TO LIVING ALONE: Z60.2

## 2017-09-29 LAB
25(OH)D3 SERPL-MCNC: 36 NG/ML
BASOPHILS # BLD AUTO: 0.02 K/UL
BASOPHILS NFR BLD AUTO: 0.3 %
EOSINOPHIL # BLD AUTO: 0.16 K/UL
EOSINOPHIL NFR BLD AUTO: 2 %
HBA1C MFR BLD HPLC: 5.7 %
HCT VFR BLD CALC: 41.3 %
HGB BLD-MCNC: 13 G/DL
IMM GRANULOCYTES NFR BLD AUTO: 0.4 %
LYMPHOCYTES # BLD AUTO: 1.69 K/UL
LYMPHOCYTES NFR BLD AUTO: 21.6 %
MAN DIFF?: NORMAL
MCHC RBC-ENTMCNC: 30.1 PG
MCHC RBC-ENTMCNC: 31.5 GM/DL
MCV RBC AUTO: 95.6 FL
MONOCYTES # BLD AUTO: 0.77 K/UL
MONOCYTES NFR BLD AUTO: 9.8 %
NEUTROPHILS # BLD AUTO: 5.15 K/UL
NEUTROPHILS NFR BLD AUTO: 65.9 %
PLATELET # BLD AUTO: 212 K/UL
RBC # BLD: 4.32 M/UL
RBC # FLD: 17.2 %
WBC # FLD AUTO: 7.82 K/UL

## 2017-10-02 LAB
ALBUMIN SERPL ELPH-MCNC: 3.8 G/DL
ALP BLD-CCNC: 149 U/L
ALT SERPL-CCNC: 9 U/L
ANION GAP SERPL CALC-SCNC: 12 MMOL/L
AST SERPL-CCNC: 15 U/L
BILIRUB SERPL-MCNC: 0.7 MG/DL
BUN SERPL-MCNC: 19 MG/DL
CALCIUM SERPL-MCNC: 9.5 MG/DL
CHLORIDE SERPL-SCNC: 99 MMOL/L
CO2 SERPL-SCNC: 28 MMOL/L
CREAT SERPL-MCNC: 0.83 MG/DL
GLUCOSE SERPL-MCNC: 129 MG/DL
POTASSIUM SERPL-SCNC: 4.6 MMOL/L
PROT SERPL-MCNC: 6.6 G/DL
SODIUM SERPL-SCNC: 139 MMOL/L

## 2017-10-03 ENCOUNTER — RX RENEWAL (OUTPATIENT)
Age: 62
End: 2017-10-03

## 2017-10-27 ENCOUNTER — INPATIENT (INPATIENT)
Facility: HOSPITAL | Age: 62
LOS: 47 days | Discharge: HOME | DRG: 3 | End: 2017-12-14
Attending: HOSPITALIST | Admitting: HOSPITALIST
Payer: MEDICARE

## 2017-10-27 VITALS
RESPIRATION RATE: 24 BRPM | WEIGHT: 229.94 LBS | SYSTOLIC BLOOD PRESSURE: 121 MMHG | HEIGHT: 72 IN | DIASTOLIC BLOOD PRESSURE: 88 MMHG | TEMPERATURE: 98 F | OXYGEN SATURATION: 96 % | HEART RATE: 90 BPM

## 2017-10-27 DIAGNOSIS — Z78.9 OTHER SPECIFIED HEALTH STATUS: Chronic | ICD-10-CM

## 2017-10-27 DIAGNOSIS — Z89.512 ACQUIRED ABSENCE OF LEFT LEG BELOW KNEE: Chronic | ICD-10-CM

## 2017-10-27 LAB
ALBUMIN SERPL ELPH-MCNC: 4.1 G/DL — SIGNIFICANT CHANGE UP (ref 3.3–5.2)
ALP SERPL-CCNC: 186 U/L — HIGH (ref 40–120)
ALT FLD-CCNC: 24 U/L — SIGNIFICANT CHANGE UP
ANION GAP SERPL CALC-SCNC: 14 MMOL/L — SIGNIFICANT CHANGE UP (ref 5–17)
APTT BLD: 33.3 SEC — SIGNIFICANT CHANGE UP (ref 27.5–37.4)
AST SERPL-CCNC: 57 U/L — HIGH
BASOPHILS # BLD AUTO: 0 K/UL — SIGNIFICANT CHANGE UP (ref 0–0.2)
BASOPHILS NFR BLD AUTO: 0.2 % — SIGNIFICANT CHANGE UP (ref 0–2)
BILIRUB SERPL-MCNC: 0.8 MG/DL — SIGNIFICANT CHANGE UP (ref 0.4–2)
BUN SERPL-MCNC: 21 MG/DL — HIGH (ref 8–20)
CALCIUM SERPL-MCNC: 8.7 MG/DL — SIGNIFICANT CHANGE UP (ref 8.6–10.2)
CHLORIDE SERPL-SCNC: 100 MMOL/L — SIGNIFICANT CHANGE UP (ref 98–107)
CK MB CFR SERPL CALC: 4 NG/ML — SIGNIFICANT CHANGE UP (ref 0–6.7)
CK SERPL-CCNC: 168 U/L — SIGNIFICANT CHANGE UP (ref 30–200)
CO2 SERPL-SCNC: 27 MMOL/L — SIGNIFICANT CHANGE UP (ref 22–29)
CREAT SERPL-MCNC: 0.59 MG/DL — SIGNIFICANT CHANGE UP (ref 0.5–1.3)
EOSINOPHIL # BLD AUTO: 0.3 K/UL — SIGNIFICANT CHANGE UP (ref 0–0.5)
EOSINOPHIL NFR BLD AUTO: 5 % — SIGNIFICANT CHANGE UP (ref 0–5)
ETHANOL SERPL-MCNC: 310 MG/DL — SIGNIFICANT CHANGE UP
GLUCOSE SERPL-MCNC: 104 MG/DL — SIGNIFICANT CHANGE UP (ref 70–115)
HCT VFR BLD CALC: 36.7 % — LOW (ref 42–52)
HGB BLD-MCNC: 12.3 G/DL — LOW (ref 14–18)
INR BLD: 1.08 RATIO — SIGNIFICANT CHANGE UP (ref 0.88–1.16)
LYMPHOCYTES # BLD AUTO: 1.4 K/UL — SIGNIFICANT CHANGE UP (ref 1–4.8)
LYMPHOCYTES # BLD AUTO: 26.3 % — SIGNIFICANT CHANGE UP (ref 20–55)
MCHC RBC-ENTMCNC: 30.8 PG — SIGNIFICANT CHANGE UP (ref 27–31)
MCHC RBC-ENTMCNC: 33.5 G/DL — SIGNIFICANT CHANGE UP (ref 32–36)
MCV RBC AUTO: 91.8 FL — SIGNIFICANT CHANGE UP (ref 80–94)
MONOCYTES # BLD AUTO: 0.4 K/UL — SIGNIFICANT CHANGE UP (ref 0–0.8)
MONOCYTES NFR BLD AUTO: 7.2 % — SIGNIFICANT CHANGE UP (ref 3–10)
NEUTROPHILS # BLD AUTO: 3.3 K/UL — SIGNIFICANT CHANGE UP (ref 1.8–8)
NEUTROPHILS NFR BLD AUTO: 61.1 % — SIGNIFICANT CHANGE UP (ref 37–73)
NT-PROBNP SERPL-SCNC: 1190 PG/ML — HIGH (ref 0–300)
PLATELET # BLD AUTO: 83 K/UL — LOW (ref 150–400)
POTASSIUM SERPL-MCNC: 4.2 MMOL/L — SIGNIFICANT CHANGE UP (ref 3.5–5.3)
POTASSIUM SERPL-SCNC: 4.2 MMOL/L — SIGNIFICANT CHANGE UP (ref 3.5–5.3)
PROT SERPL-MCNC: 7.6 G/DL — SIGNIFICANT CHANGE UP (ref 6.6–8.7)
PROTHROM AB SERPL-ACNC: 11.9 SEC — SIGNIFICANT CHANGE UP (ref 9.8–12.7)
RBC # BLD: 4 M/UL — LOW (ref 4.6–6.2)
RBC # FLD: 17.3 % — HIGH (ref 11–15.6)
SODIUM SERPL-SCNC: 141 MMOL/L — SIGNIFICANT CHANGE UP (ref 135–145)
TROPONIN T SERPL-MCNC: <0.01 NG/ML — SIGNIFICANT CHANGE UP (ref 0–0.06)
TROPONIN T SERPL-MCNC: <0.01 NG/ML — SIGNIFICANT CHANGE UP (ref 0–0.06)
WBC # BLD: 5.4 K/UL — SIGNIFICANT CHANGE UP (ref 4.8–10.8)
WBC # FLD AUTO: 5.4 K/UL — SIGNIFICANT CHANGE UP (ref 4.8–10.8)

## 2017-10-27 PROCEDURE — 71020: CPT | Mod: 26

## 2017-10-27 RX ORDER — ASPIRIN/CALCIUM CARB/MAGNESIUM 324 MG
162 TABLET ORAL ONCE
Qty: 0 | Refills: 0 | Status: COMPLETED | OUTPATIENT
Start: 2017-10-27 | End: 2017-10-27

## 2017-10-27 RX ORDER — FUROSEMIDE 40 MG
40 TABLET ORAL ONCE
Qty: 0 | Refills: 0 | Status: COMPLETED | OUTPATIENT
Start: 2017-10-27 | End: 2017-10-27

## 2017-10-27 RX ORDER — ALBUTEROL 90 UG/1
2.5 AEROSOL, METERED ORAL ONCE
Qty: 0 | Refills: 0 | Status: COMPLETED | OUTPATIENT
Start: 2017-10-27 | End: 2017-10-27

## 2017-10-27 RX ORDER — THIAMINE MONONITRATE (VIT B1) 100 MG
100 TABLET ORAL ONCE
Qty: 0 | Refills: 0 | Status: COMPLETED | OUTPATIENT
Start: 2017-10-27 | End: 2017-10-27

## 2017-10-27 RX ADMIN — Medication 100 MILLIGRAM(S): at 23:12

## 2017-10-27 RX ADMIN — Medication 40 MILLIGRAM(S): at 19:53

## 2017-10-27 RX ADMIN — ALBUTEROL 2.5 MILLIGRAM(S): 90 AEROSOL, METERED ORAL at 19:53

## 2017-10-27 RX ADMIN — Medication 50 MILLIGRAM(S): at 23:12

## 2017-10-27 NOTE — ED PROVIDER NOTE - MEDICAL DECISION MAKING DETAILS
Episode of SOB that has resolved.  Lower extremity edema noted patient is a poor historian and does not provide a good history.  ETOH use.  Will check labs, EKG, CXR and Re-eval.

## 2017-10-27 NOTE — ED PROVIDER NOTE - OBJECTIVE STATEMENT
This patient is a 62 year old man with a hx of COPD who presents to the ER c/o an episode of SOB that has resolved.  Patient states that he drinks every day.  He was drinking at home and had SOB so he called the ambulance.  Currently in the ER denies SOB, chest.  He is unable to give history of his lower extremity swelling.  No fever.

## 2017-10-27 NOTE — ED ADULT NURSE REASSESSMENT NOTE - NS ED NURSE REASSESS COMMENT FT1
Report rec'd. Pt reassessed.  Pt reports feeling better at rest.  Dyspnea w exertion.  No acute distress noted. Report rec'd. Pt reassessed.  Pt reports feeling better at rest.  Dyspnea w exertion.  No acute distress noted.  Pt has Left BTK amputation.  Redness noted to anterior right lower leg.

## 2017-10-27 NOTE — ED PROVIDER NOTE - CONSTITUTIONAL, MLM
normal... Well appearing, well nourished, awake, alert, oriented to person, place, time/situation and in no apparent distress.  ETOH on breath.

## 2017-10-27 NOTE — ED PROVIDER NOTE - CARE PLAN
Principal Discharge DX:	Congestive heart failure  Secondary Diagnosis:	Dyspnea  Secondary Diagnosis:	Alcohol intoxication

## 2017-10-27 NOTE — ED ADULT NURSE NOTE - OBJECTIVE STATEMENT
pt states that he was having some difficulty breathing today. pt also states that he drinks scotch everyday pt sitting up in cart awake and alert with o2 in place.

## 2017-10-27 NOTE — ED ADULT NURSE NOTE - PMH
Poor historian Afib    CHF (congestive heart failure)    Cirrhosis    Emphysema of lung    ETOH abuse    Poor historian Afib    Alcohol withdrawal    CHF (congestive heart failure)    Cirrhosis    Emphysema of lung    ETOH abuse    Poor historian

## 2017-10-28 DIAGNOSIS — Z89.512 ACQUIRED ABSENCE OF LEFT LEG BELOW KNEE: Chronic | ICD-10-CM

## 2017-10-28 DIAGNOSIS — I50.9 HEART FAILURE, UNSPECIFIED: ICD-10-CM

## 2017-10-28 LAB
ALBUMIN SERPL ELPH-MCNC: 3.9 G/DL — SIGNIFICANT CHANGE UP (ref 3.3–5.2)
ALP SERPL-CCNC: 172 U/L — HIGH (ref 40–120)
ALT FLD-CCNC: 21 U/L — SIGNIFICANT CHANGE UP
ANION GAP SERPL CALC-SCNC: 16 MMOL/L — SIGNIFICANT CHANGE UP (ref 5–17)
AST SERPL-CCNC: 42 U/L — HIGH
BASOPHILS # BLD AUTO: 0 K/UL — SIGNIFICANT CHANGE UP (ref 0–0.2)
BASOPHILS NFR BLD AUTO: 0.3 % — SIGNIFICANT CHANGE UP (ref 0–2)
BILIRUB SERPL-MCNC: 1.3 MG/DL — SIGNIFICANT CHANGE UP (ref 0.4–2)
BUN SERPL-MCNC: 19 MG/DL — SIGNIFICANT CHANGE UP (ref 8–20)
CALCIUM SERPL-MCNC: 8.9 MG/DL — SIGNIFICANT CHANGE UP (ref 8.6–10.2)
CHLORIDE SERPL-SCNC: 97 MMOL/L — LOW (ref 98–107)
CK SERPL-CCNC: 114 U/L — SIGNIFICANT CHANGE UP (ref 30–200)
CO2 SERPL-SCNC: 28 MMOL/L — SIGNIFICANT CHANGE UP (ref 22–29)
CREAT SERPL-MCNC: 0.61 MG/DL — SIGNIFICANT CHANGE UP (ref 0.5–1.3)
EOSINOPHIL # BLD AUTO: 0 K/UL — SIGNIFICANT CHANGE UP (ref 0–0.5)
EOSINOPHIL NFR BLD AUTO: 0.5 % — SIGNIFICANT CHANGE UP (ref 0–6)
GLUCOSE SERPL-MCNC: 141 MG/DL — HIGH (ref 70–115)
HCT VFR BLD CALC: 37.4 % — LOW (ref 42–52)
HGB BLD-MCNC: 11.9 G/DL — LOW (ref 14–18)
LYMPHOCYTES # BLD AUTO: 0.5 K/UL — LOW (ref 1–4.8)
LYMPHOCYTES # BLD AUTO: 12.1 % — LOW (ref 20–55)
MAGNESIUM SERPL-MCNC: 1.4 MG/DL — LOW (ref 1.6–2.6)
MCHC RBC-ENTMCNC: 29.2 PG — SIGNIFICANT CHANGE UP (ref 27–31)
MCHC RBC-ENTMCNC: 31.8 G/DL — LOW (ref 32–36)
MCV RBC AUTO: 91.9 FL — SIGNIFICANT CHANGE UP (ref 80–94)
MONOCYTES # BLD AUTO: 0.1 K/UL — SIGNIFICANT CHANGE UP (ref 0–0.8)
MONOCYTES NFR BLD AUTO: 2.3 % — LOW (ref 3–10)
NEUTROPHILS # BLD AUTO: 3.4 K/UL — SIGNIFICANT CHANGE UP (ref 1.8–8)
NEUTROPHILS NFR BLD AUTO: 84.5 % — HIGH (ref 37–73)
PHOSPHATE SERPL-MCNC: 4 MG/DL — SIGNIFICANT CHANGE UP (ref 2.4–4.7)
PLATELET # BLD AUTO: 75 K/UL — LOW (ref 150–400)
POTASSIUM SERPL-MCNC: 4 MMOL/L — SIGNIFICANT CHANGE UP (ref 3.5–5.3)
POTASSIUM SERPL-SCNC: 4 MMOL/L — SIGNIFICANT CHANGE UP (ref 3.5–5.3)
PROT SERPL-MCNC: 7.6 G/DL — SIGNIFICANT CHANGE UP (ref 6.6–8.7)
RBC # BLD: 4.07 M/UL — LOW (ref 4.6–6.2)
RBC # FLD: 17 % — HIGH (ref 11–15.6)
SODIUM SERPL-SCNC: 141 MMOL/L — SIGNIFICANT CHANGE UP (ref 135–145)
TROPONIN T SERPL-MCNC: <0.01 NG/ML — SIGNIFICANT CHANGE UP (ref 0–0.06)
WBC # BLD: 4 K/UL — LOW (ref 4.8–10.8)
WBC # FLD AUTO: 4 K/UL — LOW (ref 4.8–10.8)

## 2017-10-28 PROCEDURE — 73030 X-RAY EXAM OF SHOULDER: CPT | Mod: 26,LT

## 2017-10-28 PROCEDURE — 99223 1ST HOSP IP/OBS HIGH 75: CPT

## 2017-10-28 PROCEDURE — 99284 EMERGENCY DEPT VISIT MOD MDM: CPT

## 2017-10-28 PROCEDURE — 93010 ELECTROCARDIOGRAM REPORT: CPT

## 2017-10-28 RX ORDER — LISINOPRIL 2.5 MG/1
5 TABLET ORAL DAILY
Qty: 0 | Refills: 0 | Status: DISCONTINUED | OUTPATIENT
Start: 2017-10-28 | End: 2017-12-14

## 2017-10-28 RX ORDER — ALBUTEROL 90 UG/1
2.5 AEROSOL, METERED ORAL EVERY 4 HOURS
Qty: 0 | Refills: 0 | Status: DISCONTINUED | OUTPATIENT
Start: 2017-10-28 | End: 2017-11-20

## 2017-10-28 RX ORDER — LACTOBACILLUS ACIDOPHILUS 100MM CELL
1 CAPSULE ORAL
Qty: 0 | Refills: 0 | Status: DISCONTINUED | OUTPATIENT
Start: 2017-10-28 | End: 2017-10-29

## 2017-10-28 RX ORDER — TRAZODONE HCL 50 MG
0 TABLET ORAL
Qty: 0 | Refills: 0 | COMMUNITY

## 2017-10-28 RX ORDER — MAGNESIUM SULFATE 500 MG/ML
4 VIAL (ML) INJECTION ONCE
Qty: 0 | Refills: 0 | Status: COMPLETED | OUTPATIENT
Start: 2017-10-28 | End: 2017-10-28

## 2017-10-28 RX ORDER — INFLUENZA VIRUS VACCINE 15; 15; 15; 15 UG/.5ML; UG/.5ML; UG/.5ML; UG/.5ML
0.5 SUSPENSION INTRAMUSCULAR ONCE
Qty: 0 | Refills: 0 | Status: COMPLETED | OUTPATIENT
Start: 2017-10-28 | End: 2017-10-28

## 2017-10-28 RX ORDER — METOPROLOL TARTRATE 50 MG
200 TABLET ORAL DAILY
Qty: 0 | Refills: 0 | Status: DISCONTINUED | OUTPATIENT
Start: 2017-10-28 | End: 2017-10-28

## 2017-10-28 RX ORDER — METOPROLOL TARTRATE 50 MG
100 TABLET ORAL DAILY
Qty: 0 | Refills: 0 | Status: DISCONTINUED | OUTPATIENT
Start: 2017-10-28 | End: 2017-11-01

## 2017-10-28 RX ORDER — ACETAMINOPHEN 500 MG
650 TABLET ORAL EVERY 6 HOURS
Qty: 0 | Refills: 0 | Status: DISCONTINUED | OUTPATIENT
Start: 2017-10-28 | End: 2017-11-05

## 2017-10-28 RX ORDER — FOLIC ACID 0.8 MG
1 TABLET ORAL DAILY
Qty: 0 | Refills: 0 | Status: DISCONTINUED | OUTPATIENT
Start: 2017-10-28 | End: 2017-12-14

## 2017-10-28 RX ORDER — ONDANSETRON 8 MG/1
4 TABLET, FILM COATED ORAL EVERY 6 HOURS
Qty: 0 | Refills: 0 | Status: DISCONTINUED | OUTPATIENT
Start: 2017-10-28 | End: 2017-11-05

## 2017-10-28 RX ORDER — METOPROLOL TARTRATE 50 MG
0 TABLET ORAL
Qty: 0 | Refills: 0 | COMMUNITY

## 2017-10-28 RX ORDER — IPRATROPIUM/ALBUTEROL SULFATE 18-103MCG
3 AEROSOL WITH ADAPTER (GRAM) INHALATION EVERY 6 HOURS
Qty: 0 | Refills: 0 | Status: DISCONTINUED | OUTPATIENT
Start: 2017-10-28 | End: 2017-12-14

## 2017-10-28 RX ORDER — OXYCODONE AND ACETAMINOPHEN 5; 325 MG/1; MG/1
1 TABLET ORAL EVERY 4 HOURS
Qty: 0 | Refills: 0 | Status: DISCONTINUED | OUTPATIENT
Start: 2017-10-28 | End: 2017-10-31

## 2017-10-28 RX ORDER — ENOXAPARIN SODIUM 100 MG/ML
40 INJECTION SUBCUTANEOUS DAILY
Qty: 0 | Refills: 0 | Status: DISCONTINUED | OUTPATIENT
Start: 2017-10-28 | End: 2017-11-05

## 2017-10-28 RX ORDER — FUROSEMIDE 40 MG
0 TABLET ORAL
Qty: 0 | Refills: 0 | COMMUNITY

## 2017-10-28 RX ORDER — TRAZODONE HCL 50 MG
100 TABLET ORAL AT BEDTIME
Qty: 0 | Refills: 0 | Status: DISCONTINUED | OUTPATIENT
Start: 2017-10-28 | End: 2017-11-03

## 2017-10-28 RX ORDER — FUROSEMIDE 40 MG
40 TABLET ORAL DAILY
Qty: 0 | Refills: 0 | Status: DISCONTINUED | OUTPATIENT
Start: 2017-10-28 | End: 2017-10-29

## 2017-10-28 RX ORDER — THIAMINE MONONITRATE (VIT B1) 100 MG
100 TABLET ORAL DAILY
Qty: 0 | Refills: 0 | Status: COMPLETED | OUTPATIENT
Start: 2017-10-28 | End: 2017-10-30

## 2017-10-28 RX ORDER — ASPIRIN/CALCIUM CARB/MAGNESIUM 324 MG
81 TABLET ORAL DAILY
Qty: 0 | Refills: 0 | Status: DISCONTINUED | OUTPATIENT
Start: 2017-10-28 | End: 2017-11-21

## 2017-10-28 RX ORDER — MAGNESIUM OXIDE 400 MG ORAL TABLET 241.3 MG
400 TABLET ORAL
Qty: 0 | Refills: 0 | Status: COMPLETED | OUTPATIENT
Start: 2017-10-28 | End: 2017-10-30

## 2017-10-28 RX ORDER — PANTOPRAZOLE SODIUM 20 MG/1
40 TABLET, DELAYED RELEASE ORAL
Qty: 0 | Refills: 0 | Status: DISCONTINUED | OUTPATIENT
Start: 2017-10-28 | End: 2017-10-31

## 2017-10-28 RX ORDER — POTASSIUM CHLORIDE 20 MEQ
20 PACKET (EA) ORAL DAILY
Qty: 0 | Refills: 0 | Status: DISCONTINUED | OUTPATIENT
Start: 2017-10-28 | End: 2017-11-01

## 2017-10-28 RX ORDER — NICOTINE POLACRILEX 2 MG
1 GUM BUCCAL DAILY
Qty: 0 | Refills: 0 | Status: DISCONTINUED | OUTPATIENT
Start: 2017-10-28 | End: 2017-11-27

## 2017-10-28 RX ADMIN — Medication 20 MILLIEQUIVALENT(S): at 11:37

## 2017-10-28 RX ADMIN — Medication 3 MILLILITER(S): at 02:10

## 2017-10-28 RX ADMIN — Medication 100 MILLIGRAM(S): at 23:13

## 2017-10-28 RX ADMIN — Medication 40 MILLIGRAM(S): at 06:40

## 2017-10-28 RX ADMIN — OXYCODONE AND ACETAMINOPHEN 1 TABLET(S): 5; 325 TABLET ORAL at 06:38

## 2017-10-28 RX ADMIN — ENOXAPARIN SODIUM 40 MILLIGRAM(S): 100 INJECTION SUBCUTANEOUS at 11:37

## 2017-10-28 RX ADMIN — Medication 40 MILLIGRAM(S): at 17:29

## 2017-10-28 RX ADMIN — Medication 650 MILLIGRAM(S): at 08:41

## 2017-10-28 RX ADMIN — Medication 50 MILLIGRAM(S): at 11:38

## 2017-10-28 RX ADMIN — PANTOPRAZOLE SODIUM 40 MILLIGRAM(S): 20 TABLET, DELAYED RELEASE ORAL at 06:01

## 2017-10-28 RX ADMIN — Medication 1 TABLET(S): at 11:38

## 2017-10-28 RX ADMIN — Medication 50 MILLIGRAM(S): at 17:28

## 2017-10-28 RX ADMIN — OXYCODONE AND ACETAMINOPHEN 1 TABLET(S): 5; 325 TABLET ORAL at 18:54

## 2017-10-28 RX ADMIN — ONDANSETRON 4 MILLIGRAM(S): 8 TABLET, FILM COATED ORAL at 02:11

## 2017-10-28 RX ADMIN — Medication 40 MILLIGRAM(S): at 23:14

## 2017-10-28 RX ADMIN — Medication 50 MILLIGRAM(S): at 06:01

## 2017-10-28 RX ADMIN — Medication 1 TABLET(S): at 08:41

## 2017-10-28 RX ADMIN — Medication 50 MILLIGRAM(S): at 02:10

## 2017-10-28 RX ADMIN — MAGNESIUM OXIDE 400 MG ORAL TABLET 400 MILLIGRAM(S): 241.3 TABLET ORAL at 17:29

## 2017-10-28 RX ADMIN — Medication 3 MILLILITER(S): at 15:15

## 2017-10-28 RX ADMIN — Medication 1 TABLET(S): at 17:29

## 2017-10-28 RX ADMIN — Medication 650 MILLIGRAM(S): at 02:10

## 2017-10-28 RX ADMIN — Medication 650 MILLIGRAM(S): at 09:32

## 2017-10-28 RX ADMIN — Medication 100 MILLIGRAM(S): at 06:39

## 2017-10-28 RX ADMIN — Medication 3 MILLILITER(S): at 20:23

## 2017-10-28 RX ADMIN — Medication 1 PATCH: at 11:37

## 2017-10-28 RX ADMIN — Medication 650 MILLIGRAM(S): at 02:40

## 2017-10-28 RX ADMIN — Medication 162 MILLIGRAM(S): at 00:45

## 2017-10-28 RX ADMIN — MAGNESIUM OXIDE 400 MG ORAL TABLET 400 MILLIGRAM(S): 241.3 TABLET ORAL at 08:40

## 2017-10-28 RX ADMIN — Medication 40 MILLIGRAM(S): at 08:41

## 2017-10-28 RX ADMIN — Medication 50 MILLIGRAM(S): at 08:40

## 2017-10-28 RX ADMIN — MAGNESIUM OXIDE 400 MG ORAL TABLET 400 MILLIGRAM(S): 241.3 TABLET ORAL at 11:41

## 2017-10-28 RX ADMIN — OXYCODONE AND ACETAMINOPHEN 1 TABLET(S): 5; 325 TABLET ORAL at 07:44

## 2017-10-28 RX ADMIN — Medication 100 MILLIGRAM(S): at 11:41

## 2017-10-28 RX ADMIN — OXYCODONE AND ACETAMINOPHEN 1 TABLET(S): 5; 325 TABLET ORAL at 17:28

## 2017-10-28 RX ADMIN — LISINOPRIL 5 MILLIGRAM(S): 2.5 TABLET ORAL at 06:39

## 2017-10-28 RX ADMIN — Medication 100 GRAM(S): at 11:37

## 2017-10-28 RX ADMIN — Medication 40 MILLIGRAM(S): at 02:10

## 2017-10-28 RX ADMIN — Medication 50 MILLIGRAM(S): at 03:50

## 2017-10-28 RX ADMIN — Medication 1 MILLIGRAM(S): at 11:37

## 2017-10-28 RX ADMIN — Medication 40 MILLIGRAM(S): at 11:41

## 2017-10-28 RX ADMIN — Medication 81 MILLIGRAM(S): at 11:37

## 2017-10-28 NOTE — H&P ADULT - ASSESSMENT
62 years old male with PMH of A. Fib (not on AC due to falls), CHF, COPD and Alcoholism brought by EMS with shortness of breath. As per patient, he was drinking alcohol earlier today when he started having difficulty in breathing - so he called EMS. Denies chest pain, palpitations, cough, nausea, vomiting or dizziness. He has orthopnea and paroxysmal nocturnal dyspnea which are chronic. He also has chronic leg edema on right side - denies any change in it. He smokes 2 packs of cigarette per day for a long time. He is complaining of left shoulder pain - aggravates with shoulder movements. Does not remember if he got shoulder injury.   He was admitted at [facility] in August when he was treated for CHF and COPD. He was told that he also has cirrhosis likely secondary to alcoholism.   He has been in alcohol rehab in past and is willing to discuss that option again.     1) COPD Exacerbation  - Solumedrol 40 mg q 6 hours  - DuoNeb and Albuterol (PRN)  - Levofloxacin 500 mg  2) Smoking  - Counselling provided  - Nicotine Patch  3) Alcoholism  - CIWA Protocol with Librium taper  - Current CIWA score is 2.  4) Diastolic Heart Failure  - Lasix 40 mg, Metoprolol  mg and Lisinopril 5 mg  5) Chronic A. Fib  - Continue Aspirin 81 mg and Metoprolol  mg  - Not on AC due to falls  6) Elevated LFTs  - likely secondary to alcoholism / ? cirrhosis  DVT Prophylaxis -- Lovenox 40 mg 62 years old male with PMH of A. Fib (not on AC due to falls), CHF, COPD and Alcoholism brought by EMS with shortness of breath. As per patient, he was drinking alcohol earlier today when he started having difficulty in breathing - so he called EMS. Denies chest pain, palpitations, cough, nausea, vomiting or dizziness. He has orthopnea and paroxysmal nocturnal dyspnea which are chronic. He also has chronic leg edema on right side - denies any change in it. He smokes 2 packs of cigarette per day for a long time. He is complaining of left shoulder pain - aggravates with shoulder movements. Does not remember if he got shoulder injury.   He was admitted at [facility] in August when he was treated for CHF and COPD. He was told that he also has cirrhosis likely secondary to alcoholism.   He has been in alcohol rehab in past and is willing to discuss that option again.     1) COPD Exacerbation  - Solumedrol 40 mg q 6 hours  - DuoNeb and Albuterol (PRN)  - Levofloxacin 500 mg  2) Smoking  - Counselling provided  - Nicotine Patch  3) Alcoholism  - CIWA Protocol with Librium taper  - Current CIWA score is 2.  - Social Work Consult  4) Diastolic Heart Failure  - Lasix 40 mg, Metoprolol  mg and Lisinopril 5 mg  5) Chronic A. Fib  - Continue Aspirin 81 mg and Metoprolol  mg  - Not on AC due to falls  6) Elevated LFTs  - likely secondary to alcoholism / ? cirrhosis  DVT Prophylaxis -- Lovenox 40 mg

## 2017-10-28 NOTE — CONSULT NOTE ADULT - ATTENDING COMMENTS
Pt known from outpat office. 62 year old M with hx of HTN, alcohol abuse, recurrent traumatic falls s/p left BKA, and persistent atrial fibrillation not on anticoagulation (CHADSVASC =1). He has had CHF with preserved LVEF in past, and recently required hospitalization at [facility]. Now presents after another alcohol binge- per pt "I almost drank myself to death". In this setting he had dyspnea and bilateral neck pain. CHF with pulmonary congestion and small pleural effusion, consistent with CHF. elevated BNP, and 1+ LE edema.  Pt reports he has been noncompliant with home lasix and other meds.   Now feeling better this am.   -Agree with IV diuresis. Check I/Os, daily weights.   -Repeat TTE. MR murmur noted on exam  -Continue rate control with Toprol. If rates become poorly controlled (mostly over 100 bpm) would change to metoprolol 37.5mg q6 hours  -cont ASA  -CIWA protocol, MICHAEL chin regarding alcohol rehab. Pt reports agreeable for this at this time. Pt known from outpat office. 62 year old M with hx of HTN, alcohol abuse, recurrent traumatic falls s/p left BKA, and persistent atrial fibrillation not on anticoagulation (CHADSVASC =1). He has had CHF with preserved LVEF in past, and recently required hospitalization at [facility]. Now presents after another alcohol binge- per pt "I almost drank myself to death". In this setting he had dyspnea and bilateral neck pain. CHF with pulmonary congestion and small pleural effusion, consistent with CHF. elevated BNP, and 1+ LE edema.  Pt reports he has been noncompliant with home lasix and other meds.   Now feeling better this am.   Assessment is CHF exacerbation in setting of acute alcohol intoxication, poor medication compliance  -Agree with IV diuresis. Check I/Os, daily weights.   -Repeat TTE. MR murmur noted on exam  -Continue rate control with Toprol. If rates become poorly controlled (mostly over 100 bpm) would change to metoprolol 37.5mg q6 hours  -cont ASA  -CIWA protocol, MICHAEL eval regarding alcohol rehab. Pt reports agreeable for this at this time.  -stress evaluation prior to discharge to ensure no ischemic component to recurrent CHF Pt known from outpat office. 62 year old M with hx of HTN, alcohol abuse, recurrent traumatic falls s/p left BKA, and persistent atrial fibrillation not on anticoagulation (CHADSVASC =1). He has had CHF with preserved LVEF in past, and recently required hospitalization at [facility]. Now presents after another alcohol binge- per pt "I almost drank myself to death". In this setting he had dyspnea and bilateral neck pain. CHF with pulmonary congestion and small pleural effusion, consistent with CHF. elevated BNP, and 1+ LE edema.  Pt reports he has been noncompliant with home lasix and other meds.   Now feeling better this am.   Assessment is CHF exacerbation in setting of acute alcohol intoxication, poor medication compliance  -Agree with IV diuresis. Check I/Os, daily weights.   -Repeat TTE. MR murmur noted on exam  -Continue rate control with Toprol. If rates become poorly controlled (mostly over 100 bpm) would change to metoprolol 37.5mg q6 hours  -cont ASA  -CIWA protocol, MICHAEL eval regarding alcohol rehab. Pt reports agreeable for this at this time.  -Mg 1.4 this am. needs aggressive magnesium replacement. recommend 4g IV slowly today.  -stress evaluation prior to discharge to ensure no ischemic component to recurrent CHF Pt known from outpat office. 62 year old M with hx of HTN, alcohol abuse, recurrent traumatic falls s/p left BKA, and persistent atrial fibrillation not on anticoagulation (CHADSVASC =1). He has had CHF with preserved LVEF in past, and recently required hospitalization at [facility]. Now presents after another alcohol binge- per pt "I almost drank myself to death". In this setting he had dyspnea and bilateral neck pain. CHF with pulmonary congestion and small pleural effusion, consistent with CHF. elevated BNP, and 1+ LE edema.  Pt reports he has been noncompliant with home lasix and other meds.   Now feeling better this am.   Assessment is CHF exacerbation in setting of acute alcohol intoxication, poor medication compliance  -Agree with IV diuresis. Check I/Os, daily weights.   -Repeat TTE. MR murmur noted on exam  -Continue rate control with Toprol. If rates become poorly controlled (mostly over 100 bpm) would change to metoprolol 37.5mg q6 hours  -cont ASA  -CIWA protocol, MICHAEL eval regarding alcohol rehab. Pt reports agreeable for this at this time.  -Mg 1.4 this am. needs aggressive magnesium replacement. recommend 4g IV slowly today.  -stress evaluation prior to discharge to ensure no ischemic component to recurrent CHF. ECG with rate controlled AF and evidence of possible prior anteroseptal MI

## 2017-10-28 NOTE — H&P ADULT - PMH
Afib    Alcohol withdrawal    CHF (congestive heart failure)    Cirrhosis    Collapsed lung    Emphysema of lung    ETOH abuse    Falls    Meningitis    Poor historian

## 2017-10-28 NOTE — CONSULT NOTE ADULT - SUBJECTIVE AND OBJECTIVE BOX
HPI:   This patient is a 62 year old man with a hx of COPD who presents to the ER c/o an episode of SOB that has resolved.  Patient states that he drinks every day.  He was drinking at home and had SOB so he called the ambulance.  Currently in the ER denies SOB, chest pain. No fever.	  Patient  sitting up on ED stitcher NAD, denies SOB, Chest Pain, N/V/D, weakness. Patient states his was drinking daily and had increased SOB and shoulder and neck pain that is now gone. This pain was something new.     PAST MEDICAL & SURGICAL HISTORY:  Alcohol withdrawal  Emphysema of lung  ETOH abuse  Cirrhosis  Afib  CHF (congestive heart failure)  Poor historian  Poor historian    P/S + smoker history 20 YRS 1PPD, + ETOH use/abuse  Family HX: unknown secondary to history of adoption.       REVIEW OF SYSTEMS:  CONSTITUTIONAL: No fever, weight loss, or fatigue  EYES: No eye pain, visual disturbances, or discharge  ENMT:  No difficulty hearing, tinnitus, vertigo; No sinus or throat pain  NECK: + pain   RESPIRATORY: No cough, wheezing, chills or hemoptysis; + shortness of breath  CARDIOVASCULAR: No chest pain, palpitations, dizziness, or leg swelling  GASTROINTESTINAL: No abdominal or epigastric pain. No nausea, vomiting, or hematemesis; No diarrhea or constipation. No melena or hematochezia.  GENITOURINARY: No dysuria, frequency, hematuria, or incontinence  NEUROLOGICAL: No headaches,  + memory loss  SKIN: Chronic LE skin changes c/w PVD   Ext: LBKA      Allergies    No Known Allergies    Intolerances        MEDICATIONS  (STANDING):  ALBUTerol/ipratropium for Nebulization 3 milliLiter(s) Nebulizer every 6 hours  aspirin  chewable 81 milliGRAM(s) Oral daily  chlordiazePOXIDE   Oral   chlordiazePOXIDE 50 milliGRAM(s) Oral every 6 hours  enoxaparin Injectable 40 milliGRAM(s) SubCutaneous daily  folic acid 1 milliGRAM(s) Oral daily  furosemide   Injectable 40 milliGRAM(s) IV Push daily  lactobacillus acidophilus 1 Tablet(s) Oral two times a day with meals  levoFLOXacin  Tablet 500 milliGRAM(s) Oral every 24 hours  lisinopril 5 milliGRAM(s) Oral daily  magnesium oxide 400 milliGRAM(s) Oral three times a day with meals  methylPREDNISolone sodium succinate Injectable 40 milliGRAM(s) IV Push every 6 hours  metoprolol succinate  milliGRAM(s) Oral daily  multivitamin 1 Tablet(s) Oral daily  nicotine - 21 mG/24Hr(s) Patch 1 patch Transdermal daily  pantoprazole    Tablet 40 milliGRAM(s) Oral before breakfast  potassium chloride    Tablet ER 20 milliEquivalent(s) Oral daily  thiamine 100 milliGRAM(s) Oral daily  traZODone 100 milliGRAM(s) Oral at bedtime    MEDICATIONS  (PRN):  acetaminophen   Tablet 650 milliGRAM(s) Oral every 6 hours PRN For Temp greater than 38 C (100.4 F)  acetaminophen   Tablet. 650 milliGRAM(s) Oral every 6 hours PRN Headache or Bodyache  ALBUTerol    0.083% 2.5 milliGRAM(s) Nebulizer every 4 hours PRN Shortness of Breath and/or Wheezing  chlordiazePOXIDE 50 milliGRAM(s) Oral every 1 hour PRN CIWA-Ar score 8 or greater  chlordiazePOXIDE 50 milliGRAM(s) Oral every 1 hour PRN Symptom-triggered: each CIWA -Ar score 8 or GREATER  ondansetron Injectable 4 milliGRAM(s) IV Push every 6 hours PRN Nausea and/or Vomiting      Vital Signs Last 24 Hrs  T(C): 36.9 (28 Oct 2017 00:30), Max: 36.9 (28 Oct 2017 00:30)  T(F): 98.5 (28 Oct 2017 00:30), Max: 98.5 (28 Oct 2017 00:30)  HR: 96 (28 Oct 2017 00:30) (88 - 96)  BP: 133/73 (28 Oct 2017 00:30) (121/88 - 133/73)  BP(mean): --  RR: 20 (28 Oct 2017 00:30) (20 - 24)  SpO2: 98% (28 Oct 2017 00:30) (96% - 98%)    PHYSICAL EXAM:    GENERAL: NAD,  HEAD:  Atraumatic, Normocephalic  EYES: EOMI, PERRLA, conjunctiva and sclera clear  NECK: Supple, No JVD, Normal thyroid  NERVOUS SYSTEM:  Alert & Oriented X3,   CHEST/LUNG: Clear to percussion bilaterally; No rales, rhonchi, wheezing, or rubs  HEART: Regular rate and rhythm; No murmurs, rubs, or gallops  ABDOMEN: Soft, Nontender, Nondistended; Bowel sounds present  EXTREMITIES:  L BKA RLE + edema and chronic skin changes  SKIN: No rashes or lesions see above      CARDIAC MARKERS ( 27 Oct 2017 21:43 )  x     / <0.01 ng/mL / x     / x     / x      CARDIAC MARKERS ( 27 Oct 2017 15:47 )  x     / <0.01 ng/mL / 168 U/L / x     / 4.0 ng/mL                        12.3   5.4   )-----------( 83       ( 27 Oct 2017 15:47 )             36.7     27 Oct 2017 15:47    141    |  100    |  21.0   ----------------------------<  104    4.2     |  27.0   |  0.59     Ca    8.7        27 Oct 2017 15:47    TPro  7.6    /  Alb  4.1    /  TBili  0.8    /  DBili  x      /  AST  57     /  ALT  24     /  AlkPhos  186    27 Oct 2017 15:47    PT/INR - ( 27 Oct 2017 15:47 )   PT: 11.9 sec;   INR: 1.08 ratio         PTT - ( 27 Oct 2017 15:47 )  PTT:33.3 sec  CAPILLARY BLOOD GLUCOSE        LIVER FUNCTIONS - ( 27 Oct 2017 15:47 )  Alb: 4.1 g/dL / Pro: 7.6 g/dL / ALK PHOS: 186 U/L / ALT: 24 U/L / AST: 57 U/L / GGT: x           EKG: A FIB rate 75, RSR wave Lead III, V1, V2, V3 Q wave, no acute ischemic changes    CXR: marked cardiomyopathy, increased lung field marking ? compression atelectasis      ECHO 3/17/17  Summary:   1. Left ventricular ejection fraction, by visual estimation, is 60 to   65%.   2. Normal global left ventricular systolic function.   3. Mildly increased left ventricular internal cavity size.   4. Mildly increased LV wall thickness.   5. Severely dilated right atrium.   6. Mild mitral annular calcification.   7. Moderate mitral valve regurgitation.   8. Mild-moderate tricuspid regurgitation.   9. Severely enlarged left atrium.     Jamison Schuster MD Electronically signed on 3/17/2017 at 5:10:20 PM        Assessment: 61 Y/O male with history of Alcohol withdrawal, Emphysema of lung, ETOH abuse, Cirrhosis, Afib, CHF (congestive heart failure),Poor historian. Presented to the ED with SOB and neck/shoulder pain and alcohol intoxication.     Plan: Cardiomyopathy: ECHO in Am          Chest Pain and EKG changes: serial CE and EKGs HPI:   This patient is a 62 year old man with a hx of COPD who presents to the ER c/o an episode of SOB that has resolved.  Patient states that he drinks every day.  He was drinking at home and had SOB so he called the ambulance.  Currently in the ER denies SOB, chest pain. No fever.	  Patient  sitting up on ED stitcher NAD, denies SOB, Chest Pain, N/V/D, weakness. Patient states his was drinking daily and had increased SOB and shoulder and neck pain that is now gone. This pain was something new.     PAST MEDICAL & SURGICAL HISTORY:  Alcohol withdrawal  Emphysema of lung  ETOH abuse  Cirrhosis  Afib  CHF (congestive heart failure)  Left BKA secondary to infection  Poor historian      P/S + smoker history 20 YRS 1PPD, + ETOH use/abuse  Family HX: unknown secondary to history of adoption.       REVIEW OF SYSTEMS:  CONSTITUTIONAL: No fever, weight loss, or fatigue  EYES: No eye pain, visual disturbances, or discharge  ENMT:  No difficulty hearing, tinnitus, vertigo; No sinus or throat pain  NECK: + pain   RESPIRATORY: No cough, wheezing, chills or hemoptysis; + shortness of breath  CARDIOVASCULAR: No chest pain, palpitations, dizziness, or leg swelling  GASTROINTESTINAL: No abdominal or epigastric pain. No nausea, vomiting, or hematemesis; No diarrhea or constipation. No melena or hematochezia.  GENITOURINARY: No dysuria, frequency, hematuria, or incontinence  NEUROLOGICAL: No headaches,  + memory loss  SKIN: Chronic LE skin changes c/w PVD   Ext: LBKA      Allergies    No Known Allergies    Intolerances        MEDICATIONS  (STANDING):  ALBUTerol/ipratropium for Nebulization 3 milliLiter(s) Nebulizer every 6 hours  aspirin  chewable 81 milliGRAM(s) Oral daily  chlordiazePOXIDE   Oral   chlordiazePOXIDE 50 milliGRAM(s) Oral every 6 hours  enoxaparin Injectable 40 milliGRAM(s) SubCutaneous daily  folic acid 1 milliGRAM(s) Oral daily  furosemide   Injectable 40 milliGRAM(s) IV Push daily  lactobacillus acidophilus 1 Tablet(s) Oral two times a day with meals  levoFLOXacin  Tablet 500 milliGRAM(s) Oral every 24 hours  lisinopril 5 milliGRAM(s) Oral daily  magnesium oxide 400 milliGRAM(s) Oral three times a day with meals  methylPREDNISolone sodium succinate Injectable 40 milliGRAM(s) IV Push every 6 hours  metoprolol succinate  milliGRAM(s) Oral daily  multivitamin 1 Tablet(s) Oral daily  nicotine - 21 mG/24Hr(s) Patch 1 patch Transdermal daily  pantoprazole    Tablet 40 milliGRAM(s) Oral before breakfast  potassium chloride    Tablet ER 20 milliEquivalent(s) Oral daily  thiamine 100 milliGRAM(s) Oral daily  traZODone 100 milliGRAM(s) Oral at bedtime    MEDICATIONS  (PRN):  acetaminophen   Tablet 650 milliGRAM(s) Oral every 6 hours PRN For Temp greater than 38 C (100.4 F)  acetaminophen   Tablet. 650 milliGRAM(s) Oral every 6 hours PRN Headache or Bodyache  ALBUTerol    0.083% 2.5 milliGRAM(s) Nebulizer every 4 hours PRN Shortness of Breath and/or Wheezing  chlordiazePOXIDE 50 milliGRAM(s) Oral every 1 hour PRN CIWA-Ar score 8 or greater  chlordiazePOXIDE 50 milliGRAM(s) Oral every 1 hour PRN Symptom-triggered: each CIWA -Ar score 8 or GREATER  ondansetron Injectable 4 milliGRAM(s) IV Push every 6 hours PRN Nausea and/or Vomiting      Vital Signs Last 24 Hrs  T(C): 36.9 (28 Oct 2017 00:30), Max: 36.9 (28 Oct 2017 00:30)  T(F): 98.5 (28 Oct 2017 00:30), Max: 98.5 (28 Oct 2017 00:30)  HR: 96 (28 Oct 2017 00:30) (88 - 96)  BP: 133/73 (28 Oct 2017 00:30) (121/88 - 133/73)  BP(mean): --  RR: 20 (28 Oct 2017 00:30) (20 - 24)  SpO2: 98% (28 Oct 2017 00:30) (96% - 98%)    PHYSICAL EXAM:    GENERAL: NAD,  HEAD:  Atraumatic, Normocephalic  EYES: EOMI, PERRLA, conjunctiva and sclera clear  NECK: Supple, No JVD, Normal thyroid  NERVOUS SYSTEM:  Alert & Oriented X3,   CHEST/LUNG: Clear to percussion bilaterally; No rales, rhonchi, wheezing, or rubs  HEART: Regular rate and rhythm; No murmurs, rubs, or gallops  ABDOMEN: Soft, Nontender, Nondistended; Bowel sounds present  EXTREMITIES:  L BKA RLE + edema and chronic skin changes  SKIN: No rashes or lesions see above      CARDIAC MARKERS ( 27 Oct 2017 21:43 )  x     / <0.01 ng/mL / x     / x     / x      CARDIAC MARKERS ( 27 Oct 2017 15:47 )  x     / <0.01 ng/mL / 168 U/L / x     / 4.0 ng/mL                        12.3   5.4   )-----------( 83       ( 27 Oct 2017 15:47 )             36.7     27 Oct 2017 15:47    141    |  100    |  21.0   ----------------------------<  104    4.2     |  27.0   |  0.59     Ca    8.7        27 Oct 2017 15:47    TPro  7.6    /  Alb  4.1    /  TBili  0.8    /  DBili  x      /  AST  57     /  ALT  24     /  AlkPhos  186    27 Oct 2017 15:47    PT/INR - ( 27 Oct 2017 15:47 )   PT: 11.9 sec;   INR: 1.08 ratio         PTT - ( 27 Oct 2017 15:47 )  PTT:33.3 sec  CAPILLARY BLOOD GLUCOSE        LIVER FUNCTIONS - ( 27 Oct 2017 15:47 )  Alb: 4.1 g/dL / Pro: 7.6 g/dL / ALK PHOS: 186 U/L / ALT: 24 U/L / AST: 57 U/L / GGT: x           EKG: A FIB rate 75, RSR wave Lead III, V1, V2, V3 Q wave, no acute ischemic changes    CXR: marked cardiomyopathy, increased lung field marking ? compression atelectasis      ECHO 3/17/17  Summary:   1. Left ventricular ejection fraction, by visual estimation, is 60 to   65%.   2. Normal global left ventricular systolic function.   3. Mildly increased left ventricular internal cavity size.   4. Mildly increased LV wall thickness.   5. Severely dilated right atrium.   6. Mild mitral annular calcification.   7. Moderate mitral valve regurgitation.   8. Mild-moderate tricuspid regurgitation.   9. Severely enlarged left atrium.     Jamison Schuster MD Electronically signed on 3/17/2017 at 5:10:20 PM        Assessment: 61 Y/O male with history of Alcohol withdrawal, Emphysema of lung, ETOH abuse, Cirrhosis, Afib, CHF (congestive heart failure),Poor historian. Presented to the ED with SOB and neck/shoulder pain and alcohol intoxication.     Plan: Cardiomyopathy: ECHO in Am          Chest Pain and EKG changes: serial CE and EKGs

## 2017-10-28 NOTE — H&P ADULT - HISTORY OF PRESENT ILLNESS
62 years old male with PMH of A. Fib (not on AC due to falls), CHF, COPD and Alcoholism brought by EMS with shortness of breath. As per patient, he was drinking alcohol earlier today when he started having difficulty in breathing - so he called EMS. Denies chest pain, palpitations, cough, nausea, vomiting or dizziness. He has orthopnea and paroxysmal nocturnal dyspnea which are chronic. He also has chronic leg edema on right side - denies any change in it. He smokes 2 packs of cigarette per day for a long time. He is complaining of left shoulder pain - aggravates with shoulder movements. Does not remember if he got shoulder injury.   He was admitted at [facility] in August when he was treated for CHF and COPD.   He has been in alcohol rehab in past and is willing to discuss that option again. 62 years old male with PMH of A. Fib (not on AC due to falls), CHF, COPD and Alcoholism brought by EMS with shortness of breath. As per patient, he was drinking alcohol earlier today when he started having difficulty in breathing - so he called EMS. Denies chest pain, palpitations, cough, nausea, vomiting or dizziness. He has orthopnea and paroxysmal nocturnal dyspnea which are chronic. He also has chronic leg edema on right side - denies any change in it. He smokes 2 packs of cigarette per day for a long time. He is complaining of left shoulder pain - aggravates with shoulder movements. Does not remember if he got shoulder injury.   He was admitted at [facility] in August when he was treated for CHF and COPD. He was told that he also has cirrhosis likely secondary to alcoholism.   He has been in alcohol rehab in past and is willing to discuss that option again.

## 2017-10-28 NOTE — ED ADULT NURSE REASSESSMENT NOTE - NS ED NURSE REASSESS COMMENT FT1
Pt's daughter at bedside reports pt has hx of detox from alcohol in which he was trached and kept in an induced coma for approx 60 days.

## 2017-10-28 NOTE — PROGRESS NOTE ADULT - SUBJECTIVE AND OBJECTIVE BOX
XUUS TAMEZKOSTA  ----------------------------------------  The patient was seen and evaluated for CHF.  The patient is in no acute distress.  Denied any chest pain, palpitations, or abdominal pain.  Dyspnea improved. Reports left shoulder pain.    Vital Signs Last 24 Hrs  T(C): 36.8 (28 Oct 2017 07:55), Max: 37.1 (28 Oct 2017 05:01)  T(F): 98.2 (28 Oct 2017 07:55), Max: 98.7 (28 Oct 2017 05:01)  HR: 98 (28 Oct 2017 07:55) (88 - 112)  BP: 126/71 (28 Oct 2017 07:55) (121/88 - 133/73)  BP(mean): 96 (28 Oct 2017 05:01) (96 - 96)  RR: 19 (28 Oct 2017 07:55) (19 - 30)  SpO2: 94% (28 Oct 2017 07:55) (94% - 98%)    PHYSICAL EXAMINATION:  ----------------------------------------  General appearance: NAD, Awake, Alert  HEENT: NCAT, Conjunctiva clear, EOMI, Pupils reactive and equal  Neck: Supple, No JVD, No tenderness  Lungs: Clear to auscultation, Breath sound equal bilaterally, No wheezes, No rales  Cardiovascular: S1S2, Regular rhythm  Abdomen: Soft, Nontender, Nondistended, No guarding/rebound, Positive bowel sounds  Extremities: No clubbing, No cyanosis, No calf tenderness, Left below knee amputation, Mild right lower extremity edema  Neuro: Strength equal bilaterally, Minimal tremors  Psychiatric: Appropriate mood, Normal affect      LABORATORY STUDIES:  ----------------------------------------             11.9   4.0   )-----------( 75       ( 28 Oct 2017 08:28 )             37.4     10-28    141  |  97<L>  |  19.0  ----------------------------<  141<H>  4.0   |  28.0  |  0.61    Ca    8.9      28 Oct 2017 08:28  Phos  4.0     10-28  Mg     1.4     10-28    TPro  7.6  /  Alb  3.9  /  TBili  1.3  /  DBili  x   /  AST  42<H>  /  ALT  21  /  AlkPhos  172<H>  10-28    LIVER FUNCTIONS - ( 28 Oct 2017 08:28 )  Alb: 3.9 g/dL / Pro: 7.6 g/dL / ALK PHOS: 172 U/L / ALT: 21 U/L / AST: 42 U/L / GGT: x           PT/INR - ( 27 Oct 2017 15:47 )   PT: 11.9 sec;   INR: 1.08 ratio         PTT - ( 27 Oct 2017 15:47 )  PTT:33.3 sec  CARDIAC MARKERS ( 28 Oct 2017 08:28 )  x     / <0.01 ng/mL / 114 U/L / x     / x      CARDIAC MARKERS ( 27 Oct 2017 21:43 )  x     / <0.01 ng/mL / x     / x     / x      CARDIAC MARKERS ( 27 Oct 2017 15:47 )  x     / <0.01 ng/mL / 168 U/L / x     / 4.0 ng/mL    MEDICATIONS  (STANDING):  ALBUTerol/ipratropium for Nebulization 3 milliLiter(s) Nebulizer every 6 hours  aspirin  chewable 81 milliGRAM(s) Oral daily  chlordiazePOXIDE   Oral   chlordiazePOXIDE 50 milliGRAM(s) Oral every 6 hours  enoxaparin Injectable 40 milliGRAM(s) SubCutaneous daily  folic acid 1 milliGRAM(s) Oral daily  furosemide   Injectable 40 milliGRAM(s) IV Push daily  lactobacillus acidophilus 1 Tablet(s) Oral two times a day with meals  lisinopril 5 milliGRAM(s) Oral daily  magnesium oxide 400 milliGRAM(s) Oral three times a day with meals  magnesium sulfate  IVPB 4 Gram(s) IV Intermittent once  methylPREDNISolone sodium succinate Injectable 40 milliGRAM(s) IV Push every 6 hours  metoprolol succinate  milliGRAM(s) Oral daily  multivitamin 1 Tablet(s) Oral daily  nicotine - 21 mG/24Hr(s) Patch 1 patch Transdermal daily  pantoprazole    Tablet 40 milliGRAM(s) Oral before breakfast  potassium chloride    Tablet ER 20 milliEquivalent(s) Oral daily  thiamine 100 milliGRAM(s) Oral daily  traZODone 100 milliGRAM(s) Oral at bedtime    MEDICATIONS  (PRN):  acetaminophen   Tablet 650 milliGRAM(s) Oral every 6 hours PRN For Temp greater than 38 C (100.4 F)  acetaminophen   Tablet. 650 milliGRAM(s) Oral every 6 hours PRN Headache or Bodyache  ALBUTerol    0.083% 2.5 milliGRAM(s) Nebulizer every 4 hours PRN Shortness of Breath and/or Wheezing  chlordiazePOXIDE 50 milliGRAM(s) Oral every 1 hour PRN CIWA-Ar score 8 or greater  chlordiazePOXIDE 50 milliGRAM(s) Oral every 1 hour PRN Symptom-triggered: each CIWA -Ar score 8 or GREATER  ondansetron Injectable 4 milliGRAM(s) IV Push every 6 hours PRN Nausea and/or Vomiting  oxyCODONE    5 mG/acetaminophen 325 mG 1 Tablet(s) Oral every 4 hours PRN Severe Pain (7 - 10)      ASSESSMENT / PLAN:  ----------------------------------------  Acute diastolic heart failure - Cardiology consultation noted. The patient was noted to be non-compliant with medications at home. Improved with administration of furosemide.    Afib - On metoprolol. No anticoagulation due to history of falls and alcohol abuse.    Alcohol withdrawal - On chlordiazepoxide. Monitoring for symptoms of withdrawal. Alcohol cessation discussed.    Hypomagnesemia - Magnesium supplemented. Repeat laboratory studies ordered to monitor.    COPD - Inhaled bronchodilator therapy as needed. Smoking cessation discussed. Nicotine patch. XUUS TAMEZKOSTA  ----------------------------------------  The patient was seen and evaluated for CHF.  The patient is in no acute distress.  Denied any chest pain, palpitations, or abdominal pain.  Dyspnea improved. Reports left shoulder pain.    Vital Signs Last 24 Hrs  T(C): 36.8 (28 Oct 2017 07:55), Max: 37.1 (28 Oct 2017 05:01)  T(F): 98.2 (28 Oct 2017 07:55), Max: 98.7 (28 Oct 2017 05:01)  HR: 98 (28 Oct 2017 07:55) (88 - 112)  BP: 126/71 (28 Oct 2017 07:55) (121/88 - 133/73)  BP(mean): 96 (28 Oct 2017 05:01) (96 - 96)  RR: 19 (28 Oct 2017 07:55) (19 - 30)  SpO2: 94% (28 Oct 2017 07:55) (94% - 98%)    PHYSICAL EXAMINATION:  ----------------------------------------  General appearance: NAD, Awake, Alert  HEENT: NCAT, Conjunctiva clear, EOMI, Pupils reactive and equal  Neck: Supple, No JVD, No tenderness  Lungs: Clear to auscultation, Breath sound equal bilaterally, No wheezes, No rales  Cardiovascular: S1S2, Regular rhythm  Abdomen: Soft, Nontender, Nondistended, No guarding/rebound, Positive bowel sounds  Extremities: No clubbing, No cyanosis, No calf tenderness, Left below knee amputation, Mild right lower extremity edema  Neuro: Strength equal bilaterally, Minimal tremors  Psychiatric: Appropriate mood, Normal affect      LABORATORY STUDIES:  ----------------------------------------             11.9   4.0   )-----------( 75       ( 28 Oct 2017 08:28 )             37.4     10-28    141  |  97<L>  |  19.0  ----------------------------<  141<H>  4.0   |  28.0  |  0.61    Ca    8.9      28 Oct 2017 08:28  Phos  4.0     10-28  Mg     1.4     10-28    TPro  7.6  /  Alb  3.9  /  TBili  1.3  /  DBili  x   /  AST  42<H>  /  ALT  21  /  AlkPhos  172<H>  10-28    LIVER FUNCTIONS - ( 28 Oct 2017 08:28 )  Alb: 3.9 g/dL / Pro: 7.6 g/dL / ALK PHOS: 172 U/L / ALT: 21 U/L / AST: 42 U/L / GGT: x           PT/INR - ( 27 Oct 2017 15:47 )   PT: 11.9 sec;   INR: 1.08 ratio         PTT - ( 27 Oct 2017 15:47 )  PTT:33.3 sec  CARDIAC MARKERS ( 28 Oct 2017 08:28 )  x     / <0.01 ng/mL / 114 U/L / x     / x      CARDIAC MARKERS ( 27 Oct 2017 21:43 )  x     / <0.01 ng/mL / x     / x     / x      CARDIAC MARKERS ( 27 Oct 2017 15:47 )  x     / <0.01 ng/mL / 168 U/L / x     / 4.0 ng/mL    MEDICATIONS  (STANDING):  ALBUTerol/ipratropium for Nebulization 3 milliLiter(s) Nebulizer every 6 hours  aspirin  chewable 81 milliGRAM(s) Oral daily  chlordiazePOXIDE   Oral   chlordiazePOXIDE 50 milliGRAM(s) Oral every 6 hours  enoxaparin Injectable 40 milliGRAM(s) SubCutaneous daily  folic acid 1 milliGRAM(s) Oral daily  furosemide   Injectable 40 milliGRAM(s) IV Push daily  lactobacillus acidophilus 1 Tablet(s) Oral two times a day with meals  lisinopril 5 milliGRAM(s) Oral daily  magnesium oxide 400 milliGRAM(s) Oral three times a day with meals  magnesium sulfate  IVPB 4 Gram(s) IV Intermittent once  methylPREDNISolone sodium succinate Injectable 40 milliGRAM(s) IV Push every 6 hours  metoprolol succinate  milliGRAM(s) Oral daily  multivitamin 1 Tablet(s) Oral daily  nicotine - 21 mG/24Hr(s) Patch 1 patch Transdermal daily  pantoprazole    Tablet 40 milliGRAM(s) Oral before breakfast  potassium chloride    Tablet ER 20 milliEquivalent(s) Oral daily  thiamine 100 milliGRAM(s) Oral daily  traZODone 100 milliGRAM(s) Oral at bedtime    MEDICATIONS  (PRN):  acetaminophen   Tablet 650 milliGRAM(s) Oral every 6 hours PRN For Temp greater than 38 C (100.4 F)  acetaminophen   Tablet. 650 milliGRAM(s) Oral every 6 hours PRN Headache or Bodyache  ALBUTerol    0.083% 2.5 milliGRAM(s) Nebulizer every 4 hours PRN Shortness of Breath and/or Wheezing  chlordiazePOXIDE 50 milliGRAM(s) Oral every 1 hour PRN CIWA-Ar score 8 or greater  chlordiazePOXIDE 50 milliGRAM(s) Oral every 1 hour PRN Symptom-triggered: each CIWA -Ar score 8 or GREATER  ondansetron Injectable 4 milliGRAM(s) IV Push every 6 hours PRN Nausea and/or Vomiting  oxyCODONE    5 mG/acetaminophen 325 mG 1 Tablet(s) Oral every 4 hours PRN Severe Pain (7 - 10)      ASSESSMENT / PLAN:  ----------------------------------------  Acute diastolic heart failure - Cardiology consultation noted. The patient was noted to be non-compliant with medications at home. Improved with administration of furosemide. Planned for a stress test to further evaluate.	    Afib - On metoprolol. No anticoagulation due to history of falls and alcohol abuse.    Alcohol withdrawal - On chlordiazepoxide. Monitoring for symptoms of withdrawal. Alcohol cessation discussed.    Hypomagnesemia - Magnesium supplemented. Repeat laboratory studies ordered to monitor.    COPD - Inhaled bronchodilator therapy as needed. Smoking cessation discussed. Nicotine patch.

## 2017-10-28 NOTE — H&P ADULT - NSHPSOCIALHISTORY_GEN_ALL_CORE
Retired.  Lives alone.   Smokes 2 packs of cigarettes for a long time.   Drinks 3-4 drinks of scotch daily for last 5 years.  Denies illicit drug use.

## 2017-10-28 NOTE — H&P ADULT - PSH
S/P BKA (below knee amputation), left  secondary to worsening infection in lower leg. Had both ankle injuries from fall s/p repair - left had complication.

## 2017-10-28 NOTE — H&P ADULT - NSHPLABSRESULTS_GEN_ALL_CORE
LABS:                        12.3   5.4   )-----------( 83       ( 27 Oct 2017 15:47 )             36.7     10-27    141  |  100  |  21.0<H>  ----------------------------<  104  4.2   |  27.0  |  0.59    Ca    8.7      27 Oct 2017 15:47    TPro  7.6  /  Alb  4.1  /  TBili  0.8  /  DBili  x   /  AST  57<H>  /  ALT  24  /  AlkPhos  186<H>  10-27    PT/INR - ( 27 Oct 2017 15:47 )   PT: 11.9 sec;   INR: 1.08 ratio         PTT - ( 27 Oct 2017 15:47 )  PTT:33.3 sec  CARDIAC MARKERS ( 27 Oct 2017 21:43 )  x     / <0.01 ng/mL / x     / x     / x      CARDIAC MARKERS ( 27 Oct 2017 15:47 )  x     / <0.01 ng/mL / 168 U/L / x     / 4.0 ng/mL      EKG: A. Fib at 75 bpm.

## 2017-10-28 NOTE — H&P ADULT - NSHPPHYSICALEXAM_GEN_ALL_CORE
Vital Signs   T(C): 37.1 (28 Oct 2017 05:01), Max: 37.1 (28 Oct 2017 05:01)  T(F): 98.7 (28 Oct 2017 05:01), Max: 98.7 (28 Oct 2017 05:01)  HR: 112 (28 Oct 2017 05:01) (88 - 112)  BP: 131/68 (28 Oct 2017 05:01) (121/88 - 133/73)  BP(mean): 96 (28 Oct 2017 05:01) (96 - 96)  RR: 30 (28 Oct 2017 05:01) (20 - 30)  SpO2: 96% (28 Oct 2017 05:01) (96% - 98%)  General: Well developed. Well nourished. No acute distress  HEENT: PERRLA. EOMI. Clear conjunctivae. Moist mucus membrane  Neck: Supple. No JVD. No Thyromegaly   Chest: Decreased air entry bilaterally. Scattered wheezing. No rales or rhonchi. No chest wall tenderness.  Heart: S1 & S2 with regularly irregular rhythm.   Abdomen: Obese. Soft. Non-tender. + BS  RLE: Erythematous. No warmth or tenderness. 2+ pitting edema. No calf tenderness. LLE: BKA. Left Shoulder: Tender anteriorly. No erythema, warmth or obvious muscle injury. ROM intact.   Neuro: AAO x 3, No focal deficit, CN II-XII grossly WNL and no speech disorder  Skin: Warm and Dry  Psychiatry: Normal mood and affect

## 2017-10-29 LAB
ANION GAP SERPL CALC-SCNC: 10 MMOL/L — SIGNIFICANT CHANGE UP (ref 5–17)
BUN SERPL-MCNC: 24 MG/DL — HIGH (ref 8–20)
CALCIUM SERPL-MCNC: 8.8 MG/DL — SIGNIFICANT CHANGE UP (ref 8.6–10.2)
CHLORIDE SERPL-SCNC: 96 MMOL/L — LOW (ref 98–107)
CO2 SERPL-SCNC: 31 MMOL/L — HIGH (ref 22–29)
CREAT SERPL-MCNC: 0.6 MG/DL — SIGNIFICANT CHANGE UP (ref 0.5–1.3)
GLUCOSE SERPL-MCNC: 159 MG/DL — HIGH (ref 70–115)
MAGNESIUM SERPL-MCNC: 2.3 MG/DL — SIGNIFICANT CHANGE UP (ref 1.6–2.6)
POTASSIUM SERPL-MCNC: 4.3 MMOL/L — SIGNIFICANT CHANGE UP (ref 3.5–5.3)
POTASSIUM SERPL-SCNC: 4.3 MMOL/L — SIGNIFICANT CHANGE UP (ref 3.5–5.3)
SODIUM SERPL-SCNC: 137 MMOL/L — SIGNIFICANT CHANGE UP (ref 135–145)

## 2017-10-29 PROCEDURE — 99233 SBSQ HOSP IP/OBS HIGH 50: CPT

## 2017-10-29 RX ORDER — FUROSEMIDE 40 MG
20 TABLET ORAL DAILY
Qty: 0 | Refills: 0 | Status: DISCONTINUED | OUTPATIENT
Start: 2017-10-29 | End: 2017-11-14

## 2017-10-29 RX ADMIN — Medication 40 MILLIGRAM(S): at 05:39

## 2017-10-29 RX ADMIN — Medication 81 MILLIGRAM(S): at 13:07

## 2017-10-29 RX ADMIN — Medication 1 MILLIGRAM(S): at 13:07

## 2017-10-29 RX ADMIN — Medication 3 MILLILITER(S): at 20:38

## 2017-10-29 RX ADMIN — Medication 40 MILLIGRAM(S): at 22:09

## 2017-10-29 RX ADMIN — Medication 1 TABLET(S): at 13:07

## 2017-10-29 RX ADMIN — Medication 3 MILLILITER(S): at 15:07

## 2017-10-29 RX ADMIN — Medication 3 MILLILITER(S): at 09:48

## 2017-10-29 RX ADMIN — Medication 50 MILLIGRAM(S): at 22:09

## 2017-10-29 RX ADMIN — LISINOPRIL 5 MILLIGRAM(S): 2.5 TABLET ORAL at 05:39

## 2017-10-29 RX ADMIN — ENOXAPARIN SODIUM 40 MILLIGRAM(S): 100 INJECTION SUBCUTANEOUS at 13:06

## 2017-10-29 RX ADMIN — Medication 20 MILLIGRAM(S): at 18:38

## 2017-10-29 RX ADMIN — Medication 1 PATCH: at 11:00

## 2017-10-29 RX ADMIN — Medication 40 MILLIGRAM(S): at 13:07

## 2017-10-29 RX ADMIN — MAGNESIUM OXIDE 400 MG ORAL TABLET 400 MILLIGRAM(S): 241.3 TABLET ORAL at 13:07

## 2017-10-29 RX ADMIN — Medication 50 MILLIGRAM(S): at 13:07

## 2017-10-29 RX ADMIN — Medication 1 TABLET(S): at 09:53

## 2017-10-29 RX ADMIN — Medication 100 MILLIGRAM(S): at 05:39

## 2017-10-29 RX ADMIN — PANTOPRAZOLE SODIUM 40 MILLIGRAM(S): 20 TABLET, DELAYED RELEASE ORAL at 05:40

## 2017-10-29 RX ADMIN — Medication 50 MILLIGRAM(S): at 05:38

## 2017-10-29 RX ADMIN — Medication 1 PATCH: at 13:06

## 2017-10-29 RX ADMIN — Medication 100 MILLIGRAM(S): at 13:07

## 2017-10-29 RX ADMIN — MAGNESIUM OXIDE 400 MG ORAL TABLET 400 MILLIGRAM(S): 241.3 TABLET ORAL at 08:16

## 2017-10-29 RX ADMIN — Medication 100 MILLIGRAM(S): at 22:59

## 2017-10-29 RX ADMIN — Medication 20 MILLIEQUIVALENT(S): at 13:07

## 2017-10-29 RX ADMIN — MAGNESIUM OXIDE 400 MG ORAL TABLET 400 MILLIGRAM(S): 241.3 TABLET ORAL at 18:37

## 2017-10-29 NOTE — PROGRESS NOTE ADULT - ASSESSMENT
PT feeling much better today, denies SOB. Less edema.   -will change to PO lasix  -continue rate control. prefer to continue telemetry for HR monitoring  -TTE and stress   -Etoh rehab upon discharge

## 2017-10-29 NOTE — PROGRESS NOTE ADULT - SUBJECTIVE AND OBJECTIVE BOX
STONE JOE  ----------------------------------------  The patient was seen and evaluated for COPD.  The patient is in no acute distress.  Denied any chest pain, palpitations, or abdominal pain.  Reports improvement in dyspnea with persistent intermittent cough.    Vital Signs Last 24 Hrs  T(C): 36.4 (29 Oct 2017 05:31), Max: 37 (28 Oct 2017 16:08)  T(F): 97.6 (29 Oct 2017 05:31), Max: 98.6 (28 Oct 2017 16:08)  HR: 72 (29 Oct 2017 05:31) (72 - 99)  BP: 116/78 (29 Oct 2017 05:31) (116/78 - 131/72)  BP(mean): --  RR: 20 (29 Oct 2017 05:31) (10 - 20)  SpO2: 97% (28 Oct 2017 22:23) (94% - 97%)    PHYSICAL EXAMINATION:  ----------------------------------------  General appearance: NAD, Awake, Alert  HEENT: NCAT, Conjunctiva clear, EOMI, Pupils reactive and equal  Neck: Supple, No JVD, No tenderness  Lungs: Clear to auscultation, Breath sound equal bilaterally, No wheezes, No rales  Cardiovascular: S1S2, Regular rhythm  Abdomen: Soft, Nontender, Nondistended, No guarding/rebound, Positive bowel sounds  Extremities: No clubbing, No cyanosis, No calf tenderness, Left below knee amputation, Mild right lower extremity edema  Neuro: Strength equal bilaterally, Minimal tremors  Psychiatric: Appropriate mood, Normal affect    LABORATORY STUDIES:  ----------------------------------------                        11.9   4.0   )-----------( 75       ( 28 Oct 2017 08:28 )             37.4     10-29    137  |  96<L>  |  24.0<H>  ----------------------------<  159<H>  4.3   |  31.0<H>  |  0.60    Ca    8.8      29 Oct 2017 05:46  Phos  4.0     10-28  Mg     2.3     10-29    TPro  7.6  /  Alb  3.9  /  TBili  1.3  /  DBili  x   /  AST  42<H>  /  ALT  21  /  AlkPhos  172<H>  10-28    LIVER FUNCTIONS - ( 28 Oct 2017 08:28 )  Alb: 3.9 g/dL / Pro: 7.6 g/dL / ALK PHOS: 172 U/L / ALT: 21 U/L / AST: 42 U/L / GGT: x           PT/INR - ( 27 Oct 2017 15:47 )   PT: 11.9 sec;   INR: 1.08 ratio         PTT - ( 27 Oct 2017 15:47 )  PTT:33.3 sec  CARDIAC MARKERS ( 28 Oct 2017 08:28 )  x     / <0.01 ng/mL / 114 U/L / x     / x      CARDIAC MARKERS ( 27 Oct 2017 21:43 )  x     / <0.01 ng/mL / x     / x     / x      CARDIAC MARKERS ( 27 Oct 2017 15:47 )  x     / <0.01 ng/mL / 168 U/L / x     / 4.0 ng/mL              MEDICATIONS  (STANDING):  ALBUTerol/ipratropium for Nebulization 3 milliLiter(s) Nebulizer every 6 hours  aspirin  chewable 81 milliGRAM(s) Oral daily  chlordiazePOXIDE   Oral   chlordiazePOXIDE 50 milliGRAM(s) Oral every 8 hours  enoxaparin Injectable 40 milliGRAM(s) SubCutaneous daily  folic acid 1 milliGRAM(s) Oral daily  furosemide   Injectable 40 milliGRAM(s) IV Push daily  influenza   Vaccine 0.5 milliLiter(s) IntraMuscular once  lactobacillus acidophilus 1 Tablet(s) Oral two times a day with meals  lisinopril 5 milliGRAM(s) Oral daily  magnesium oxide 400 milliGRAM(s) Oral three times a day with meals  methylPREDNISolone sodium succinate Injectable 40 milliGRAM(s) IV Push every 6 hours  metoprolol succinate  milliGRAM(s) Oral daily  multivitamin 1 Tablet(s) Oral daily  nicotine - 21 mG/24Hr(s) Patch 1 patch Transdermal daily  pantoprazole    Tablet 40 milliGRAM(s) Oral before breakfast  potassium chloride    Tablet ER 20 milliEquivalent(s) Oral daily  thiamine 100 milliGRAM(s) Oral daily  traZODone 100 milliGRAM(s) Oral at bedtime    MEDICATIONS  (PRN):  acetaminophen   Tablet 650 milliGRAM(s) Oral every 6 hours PRN For Temp greater than 38 C (100.4 F)  acetaminophen   Tablet. 650 milliGRAM(s) Oral every 6 hours PRN Headache or Bodyache  ALBUTerol    0.083% 2.5 milliGRAM(s) Nebulizer every 4 hours PRN Shortness of Breath and/or Wheezing  chlordiazePOXIDE 50 milliGRAM(s) Oral every 1 hour PRN CIWA-Ar score 8 or greater  chlordiazePOXIDE 50 milliGRAM(s) Oral every 1 hour PRN Symptom-triggered: each CIWA -Ar score 8 or GREATER  ondansetron Injectable 4 milliGRAM(s) IV Push every 6 hours PRN Nausea and/or Vomiting  oxyCODONE    5 mG/acetaminophen 325 mG 1 Tablet(s) Oral every 4 hours PRN Severe Pain (7 - 10)      ASSESSMENT / PLAN:  ----------------------------------------  Acute diastolic heart failure - Improved on furosemide. Planned for a stress test to further evaluate. On lisinopril and metoprolol.    Afib - Rate controlled on metoprolol. No anticoagulation due to history of falls and alcohol abuse.    Alcohol withdrawal - On chlordiazepoxide taper. Alcohol and tobacco cessation discussed.    Hypomagnesemia - Magnesium supplemented with improvement on repeat studies.    COPD - Inhaled bronchodilator therapy as needed. On intravenous methylprednisolone. Smoking cessation discussed. Nicotine patch. STONE DIAZ  ----------------------------------------  The patient was seen and evaluated for CHF.  The patient is in no acute distress.  Denied any chest pain, palpitations, or abdominal pain.  Dyspnea improved. Reports left shoulder pain.    Vital Signs Last 24 Hrs  T(C): 36.8 (28 Oct 2017 07:55), Max: 37.1 (28 Oct 2017 05:01)  T(F): 98.2 (28 Oct 2017 07:55), Max: 98.7 (28 Oct 2017 05:01)  HR: 98 (28 Oct 2017 07:55) (88 - 112)  BP: 126/71 (28 Oct 2017 07:55) (121/88 - 133/73)  BP(mean): 96 (28 Oct 2017 05:01) (96 - 96)  RR: 19 (28 Oct 2017 07:55) (19 - 30)  SpO2: 94% (28 Oct 2017 07:55) (94% - 98%)    PHYSICAL EXAMINATION:  ----------------------------------------  General appearance: NAD, Awake, Alert  HEENT: NCAT, Conjunctiva clear, EOMI, Pupils reactive and equal  Neck: Supple, No JVD, No tenderness  Lungs: Clear to auscultation, Breath sound equal bilaterally, No wheezes, No rales  Cardiovascular: S1S2, Regular rhythm  Abdomen: Soft, Nontender, Nondistended, No guarding/rebound, Positive bowel sounds  Extremities: No clubbing, No cyanosis, No calf tenderness, Left below knee amputation, Mild right lower extremity edema  Neuro: Strength equal bilaterally, Minimal tremors  Psychiatric: Appropriate mood, Normal affect      LABORATORY STUDIES:  ----------------------------------------             11.9   4.0   )-----------( 75       ( 28 Oct 2017 08:28 )             37.4     10-28    141  |  97<L>  |  19.0  ----------------------------<  141<H>  4.0   |  28.0  |  0.61    Ca    8.9      28 Oct 2017 08:28  Phos  4.0     10-28  Mg     1.4     10-28    TPro  7.6  /  Alb  3.9  /  TBili  1.3  /  DBili  x   /  AST  42<H>  /  ALT  21  /  AlkPhos  172<H>  10-28    LIVER FUNCTIONS - ( 28 Oct 2017 08:28 )  Alb: 3.9 g/dL / Pro: 7.6 g/dL / ALK PHOS: 172 U/L / ALT: 21 U/L / AST: 42 U/L / GGT: x           PT/INR - ( 27 Oct 2017 15:47 )   PT: 11.9 sec;   INR: 1.08 ratio         PTT - ( 27 Oct 2017 15:47 )  PTT:33.3 sec  CARDIAC MARKERS ( 28 Oct 2017 08:28 )  x     / <0.01 ng/mL / 114 U/L / x     / x      CARDIAC MARKERS ( 27 Oct 2017 21:43 )  x     / <0.01 ng/mL / x     / x     / x      CARDIAC MARKERS ( 27 Oct 2017 15:47 )  x     / <0.01 ng/mL / 168 U/L / x     / 4.0 ng/mL    MEDICATIONS  (STANDING):  ALBUTerol/ipratropium for Nebulization 3 milliLiter(s) Nebulizer every 6 hours  aspirin  chewable 81 milliGRAM(s) Oral daily  chlordiazePOXIDE   Oral   chlordiazePOXIDE 50 milliGRAM(s) Oral every 6 hours  enoxaparin Injectable 40 milliGRAM(s) SubCutaneous daily  folic acid 1 milliGRAM(s) Oral daily  furosemide   Injectable 40 milliGRAM(s) IV Push daily  lactobacillus acidophilus 1 Tablet(s) Oral two times a day with meals  lisinopril 5 milliGRAM(s) Oral daily  magnesium oxide 400 milliGRAM(s) Oral three times a day with meals  magnesium sulfate  IVPB 4 Gram(s) IV Intermittent once  methylPREDNISolone sodium succinate Injectable 40 milliGRAM(s) IV Push every 6 hours  metoprolol succinate  milliGRAM(s) Oral daily  multivitamin 1 Tablet(s) Oral daily  nicotine - 21 mG/24Hr(s) Patch 1 patch Transdermal daily  pantoprazole    Tablet 40 milliGRAM(s) Oral before breakfast  potassium chloride    Tablet ER 20 milliEquivalent(s) Oral daily  thiamine 100 milliGRAM(s) Oral daily  traZODone 100 milliGRAM(s) Oral at bedtime    MEDICATIONS  (PRN):  acetaminophen   Tablet 650 milliGRAM(s) Oral every 6 hours PRN For Temp greater than 38 C (100.4 F)  acetaminophen   Tablet. 650 milliGRAM(s) Oral every 6 hours PRN Headache or Bodyache  ALBUTerol    0.083% 2.5 milliGRAM(s) Nebulizer every 4 hours PRN Shortness of Breath and/or Wheezing  chlordiazePOXIDE 50 milliGRAM(s) Oral every 1 hour PRN CIWA-Ar score 8 or greater  chlordiazePOXIDE 50 milliGRAM(s) Oral every 1 hour PRN Symptom-triggered: each CIWA -Ar score 8 or GREATER  ondansetron Injectable 4 milliGRAM(s) IV Push every 6 hours PRN Nausea and/or Vomiting  oxyCODONE    5 mG/acetaminophen 325 mG 1 Tablet(s) Oral every 4 hours PRN Severe Pain (7 - 10)      ASSESSMENT / PLAN:  ----------------------------------------  Acute diastolic heart failure - Cardiology consultation noted. The patient was noted to be non-compliant with medications at home. Improved with administration of furosemide.    Afib - On metoprolol. No anticoagulation due to history of falls and alcohol abuse.    Alcohol withdrawal - On chlordiazepoxide. Monitoring for symptoms of withdrawal. Alcohol cessation discussed.    Hypomagnesemia - Magnesium supplemented. Repeat laboratory studies ordered to monitor.    COPD - Inhaled bronchodilator therapy as needed. Smoking cessation discussed. Nicotine patch.

## 2017-10-29 NOTE — PROGRESS NOTE ADULT - SUBJECTIVE AND OBJECTIVE BOX
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       REVIEW OF SYSTEMS:  CONSTITUTIONAL: No fever, weight loss, or fatigue  EYES: No eye pain, visual disturbances, or discharge  RESPIRATORY: No cough, wheezing, chills or hemoptysis; + shortness of breath  CARDIOVASCULAR: see HPI  GASTROINTESTINAL: No abdominal or epigastric pain. No nausea, vomiting, or hematemesis; No diarrhea or constipation. No melena or hematochezia.  GENITOURINARY: No dysuria, frequency, hematuria, or incontinence  NEUROLOGICAL: No headaches,  + memory loss  SKIN: Chronic LE skin changes c/w PVD   Ext: LBKA      Allergies  No Known Allergies      MEDICATIONS  (STANDING):  ALBUTerol/ipratropium for Nebulization 3 milliLiter(s) Nebulizer every 6 hours  aspirin  chewable 81 milliGRAM(s) Oral daily  chlordiazePOXIDE   Oral   chlordiazePOXIDE 50 milliGRAM(s) Oral every 8 hours  enoxaparin Injectable 40 milliGRAM(s) SubCutaneous daily  folic acid 1 milliGRAM(s) Oral daily  furosemide   Injectable 40 milliGRAM(s) IV Push daily  influenza   Vaccine 0.5 milliLiter(s) IntraMuscular once  lisinopril 5 milliGRAM(s) Oral daily  magnesium oxide 400 milliGRAM(s) Oral three times a day with meals  methylPREDNISolone sodium succinate Injectable 40 milliGRAM(s) IV Push every 8 hours  metoprolol succinate  milliGRAM(s) Oral daily  multivitamin 1 Tablet(s) Oral daily  nicotine - 21 mG/24Hr(s) Patch 1 patch Transdermal daily  pantoprazole    Tablet 40 milliGRAM(s) Oral before breakfast  potassium chloride    Tablet ER 20 milliEquivalent(s) Oral daily  thiamine 100 milliGRAM(s) Oral daily  traZODone 100 milliGRAM(s) Oral at bedtime    MEDICATIONS  (PRN):  acetaminophen   Tablet 650 milliGRAM(s) Oral every 6 hours PRN For Temp greater than 38 C (100.4 F)  acetaminophen   Tablet. 650 milliGRAM(s) Oral every 6 hours PRN Headache or Bodyache  ALBUTerol    0.083% 2.5 milliGRAM(s) Nebulizer every 4 hours PRN Shortness of Breath and/or Wheezing  chlordiazePOXIDE 50 milliGRAM(s) Oral every 1 hour PRN CIWA-Ar score 8 or greater  chlordiazePOXIDE 50 milliGRAM(s) Oral every 1 hour PRN Symptom-triggered: each CIWA -Ar score 8 or GREATER  ondansetron Injectable 4 milliGRAM(s) IV Push every 6 hours PRN Nausea and/or Vomiting  oxyCODONE    5 mG/acetaminophen 325 mG 1 Tablet(s) Oral every 4 hours PRN Severe Pain (7 - 10)      Vital Signs Last 24 Hrs  T(C): 36.8 (29 Oct 2017 10:54), Max: 36.8 (28 Oct 2017 16:55)  T(F): 98.2 (29 Oct 2017 10:54), Max: 98.2 (28 Oct 2017 16:55)  HR: 106 (29 Oct 2017 10:54) (72 - 106)  BP: 102/64 (29 Oct 2017 10:54) (102/64 - 124/82)  RR: 20 (29 Oct 2017 10:54) (18 - 20)  SpO2: 97% (29 Oct 2017 10:54) (94% - 97%)T(C): 36.9 (28 Oct 2017 00:30), Max: 36.9 (28 Oct 2017 00:30)    PHYSICAL EXAM:  GENERAL: VSS, NAD, A&O x 3  CHEST/LUNG: CTA bilaterally; No rales, rhonchi, wheezing, or rubs  HEART: S1/S2, mildly tachycardic, irregular; II/VI HERMILA  ABDOMEN: Soft, Nontender, Nondistended; Bowel sounds present  EXTREMITIES:  L BKA, RLE 1+ pitting edema to knee and chronic skin changes    Labs:                       11.9   4.0   )-----------( 75       ( 28 Oct 2017 08:28 )             37.4     10-29    137  |  96<L>  |  24.0<H>  ----------------------------<  159<H>  4.3   |  31.0<H>  |  0.60    Ca    8.8      29 Oct 2017 05:46  Phos  4.0     10-28  Mg     2.3     10-29    TPro  7.6  /  Alb  3.9  /  TBili  1.3  /  DBili  x   /  AST  42<H>  /  ALT  21  /  AlkPhos  172<H>  10-28    CARDIAC MARKERS ( 28 Oct 2017 08:28 )  x     / <0.01 ng/mL / 114 U/L / x     / x      CARDIAC MARKERS ( 27 Oct 2017 21:43 )  x     / <0.01 ng/mL / x     / x     / x          ECHO 3/17/17  Summary:   1. Left ventricular ejection fraction, by visual estimation, is 60 to   65%.   2. Normal global left ventricular systolic function.   3. Mildly increased left ventricular internal cavity size.   4. Mildly increased LV wall thickness.   5. Severely dilated right atrium.   6. Mild mitral annular calcification.   7. Moderate mitral valve regurgitation.   8. Mild-moderate tricuspid regurgitation.   9. Severely enlarged left atrium.     Jamison Schuster MD Electronically signed on 3/17/2017 at 5:10:20 PM        Assessment: 61 Y/O male with history of Alcohol withdrawal, Emphysema of lung, ETOH abuse, Cirrhosis, Afib, CHF (congestive heart failure),Poor historian. Presented to the ED with SOB and neck/shoulder pain and alcohol intoxication.     Plan:  -Await repeat echo - likely tomorrow AM.   -Continue diuresis. Check I/Os, daily weights.   -Recommend pt be maintained on telemetry given AF w/ RVR and need to titrate med tx for appropriate rate control.   -Continue rate control with Toprol. If rates remain elevated (mostly over 100 bpm) would change to metoprolol 37.5mg q6 hours  -cont ASA  -CIWA protocol, SW eval regarding alcohol rehab. Pt reports agreeable for this at this time.  - continue electrolyte monitoring w/ repletion as needed to maintain Mg >2, K>4.   - stress evaluation prior to discharge to ensure no ischemic component to recurrent CHF

## 2017-10-30 ENCOUNTER — TRANSCRIPTION ENCOUNTER (OUTPATIENT)
Age: 62
End: 2017-10-30

## 2017-10-30 PROCEDURE — 93018 CV STRESS TEST I&R ONLY: CPT

## 2017-10-30 PROCEDURE — 93016 CV STRESS TEST SUPVJ ONLY: CPT

## 2017-10-30 PROCEDURE — 78452 HT MUSCLE IMAGE SPECT MULT: CPT | Mod: 26

## 2017-10-30 PROCEDURE — 99233 SBSQ HOSP IP/OBS HIGH 50: CPT

## 2017-10-30 RX ORDER — HALOPERIDOL DECANOATE 100 MG/ML
1 INJECTION INTRAMUSCULAR EVERY 4 HOURS
Qty: 0 | Refills: 0 | Status: DISCONTINUED | OUTPATIENT
Start: 2017-10-30 | End: 2017-10-31

## 2017-10-30 RX ADMIN — Medication 60 MILLIGRAM(S): at 18:41

## 2017-10-30 RX ADMIN — HALOPERIDOL DECANOATE 1 MILLIGRAM(S): 100 INJECTION INTRAMUSCULAR at 20:07

## 2017-10-30 RX ADMIN — PANTOPRAZOLE SODIUM 40 MILLIGRAM(S): 20 TABLET, DELAYED RELEASE ORAL at 06:19

## 2017-10-30 RX ADMIN — Medication 20 MILLIGRAM(S): at 06:19

## 2017-10-30 RX ADMIN — Medication 50 MILLIGRAM(S): at 23:49

## 2017-10-30 RX ADMIN — Medication 50 MILLIGRAM(S): at 19:41

## 2017-10-30 RX ADMIN — Medication 50 MILLIGRAM(S): at 18:41

## 2017-10-30 RX ADMIN — Medication 40 MILLIGRAM(S): at 06:20

## 2017-10-30 RX ADMIN — MAGNESIUM OXIDE 400 MG ORAL TABLET 400 MILLIGRAM(S): 241.3 TABLET ORAL at 18:41

## 2017-10-30 RX ADMIN — Medication 50 MILLIGRAM(S): at 21:01

## 2017-10-30 RX ADMIN — Medication 3 MILLILITER(S): at 09:09

## 2017-10-30 RX ADMIN — Medication 50 MILLIGRAM(S): at 22:21

## 2017-10-30 RX ADMIN — LISINOPRIL 5 MILLIGRAM(S): 2.5 TABLET ORAL at 06:20

## 2017-10-30 RX ADMIN — Medication 100 MILLIGRAM(S): at 07:05

## 2017-10-30 RX ADMIN — Medication 100 MILLIGRAM(S): at 21:00

## 2017-10-30 NOTE — DISCHARGE NOTE ADULT - PROVIDER TOKENS
FREE:[LAST:[Robby Aburto)],PHONE:[(   )    -],FAX:[(   )    -],ADDRESS:[follow up in 2 week,  you need CT chest in 2 months please have you lung doctor follow on that]],FREE:[LAST:[PCP],PHONE:[(   )    -],FAX:[(   )    -],ADDRESS:[Follow up with PCP in 1 week after discharge from Rehab]]

## 2017-10-30 NOTE — CHART NOTE - NSCHARTNOTEFT_GEN_A_CORE
Rapid Response PGY 1 Note  Patient is a 62y old  Male admitted for alcohol intoxication. Patient signed out AMA this afternoon around 1:00PM and was found disoriented in the hospital around 4:30PM. Patient had gone out and drank 4-5 2oz drinks of scotch between that time. Rapid response was called due to CIWA score of 21, aggressive behavior including throwing chair at people. Patient is on 1 to 1 observation. Patient had ripped out his IV line earlier. Patient was given one dose of Librium during the rapid response.    Patient was seen and examined at the bedside by the rapid response team.    Allergies: NKDA    PAST MEDICAL & SURGICAL HISTORY:  Falls  Meningitis  Collapsed lung  Alcohol withdrawal  Emphysema of lung  ETOH abuse  Cirrhosis  Afib  CHF (congestive heart failure)  Poor historian  S/P BKA (below knee amputation), left: secondary to worsening infection in lower leg. Had both ankle injuries from fall s/p repair - left had complication.      Vital Signs Last 24 Hrs  BP:  HR:  RR:  O2:  Temp:    GENERAL: The patient is awake and alert. AAOx2, patient not aware of his location, states he is at his residence. Agitated state.  HEENT: Head is normocephalic and atraumatic. Extraocular muscles are intact. Mucous membranes are moist.   LUNGS: Clear to auscultation BL without wheezing, rales or rhonchi; respirations unlabored  HEART: Regular rate and rhythm ,+S1/+S2, no murmurs, rubs, gallops  ABDOMEN: Soft, obese, nontender, no rebound, guarding rigidity, bowel sounds in all 4 quadrants  EXTREMITIES: Without any cyanosis, clubbing, rash, lesions or edema.  SKIN: No new rashes or lesions.  MSK: strength equal BL  NEUROLOGIC: Grossly intact.  PSYCH: No new changes.    10-29 @ 07:01  -  10-30 @ 07:00  --------------------------------------------------------  IN: 200 mL / OUT: 241 mL / NET: -41 mL          10-29    137  |  96<L>  |  24.0<H>  ----------------------------<  159<H>  4.3   |  31.0<H>  |  0.60    Ca    8.8      29 Oct 2017 05:46  Mg     2.3     10-29          Assessment- Rapid Response called for 62y year old Male with CIWA score of 21 and portraying signs of aggression.    Plan-  Patient given Librium during rapid  Haldol 1mg Q4H PRN for agitation  Continue current management of alcohol withdrawal  Discussed with SROC Dr. Sanders  Discussed case with nocturnist Dr. Vogt Rapid Response PGY 1 Note  Patient is a 62y old  Male admitted for alcohol intoxication. Patient signed out AMA this afternoon around 1:00PM and was found disoriented in the hospital around 4:30PM. Patient had gone out and drank 4-5 2oz drinks of scotch between that time. Rapid response was called due to CIWA score of 21, aggressive behavior including throwing chair at people. Patient is on 1 to 1 observation. Patient had ripped out his IV line earlier. Patient was given one dose of Librium during the rapid response.    Patient was seen and examined at the bedside by the rapid response team.    Allergies: NKDA    PAST MEDICAL & SURGICAL HISTORY:  Falls  Meningitis  Collapsed lung  Alcohol withdrawal  Emphysema of lung  ETOH abuse  Cirrhosis  Afib  CHF (congestive heart failure)  Poor historian  S/P BKA (below knee amputation), left: secondary to worsening infection in lower leg. Had both ankle injuries from fall s/p repair - left had complication.      Vital Signs Last 24 Hrs  T(C): 36.6 (30 Oct 2017 16:10), Max: 36.6 (30 Oct 2017 16:10)  T(F): 97.8 (30 Oct 2017 16:10), Max: 97.8 (30 Oct 2017 16:10)  HR: 87 (30 Oct 2017 20:06) (87 - 103)  BP: 118/74 (30 Oct 2017 20:06) (118/74 - 131/74)  BP(mean): --  RR: 18 (30 Oct 2017 16:10) (18 - 20)  SpO2: 96% (30 Oct 2017 16:10) (94% - 97%)    GENERAL: The patient is awake and alert. AAOx2, patient not aware of his location, states he is at his residence. Agitated state.  HEENT: Head is normocephalic and atraumatic. Extraocular muscles are intact. Mucous membranes are moist.   LUNGS: Clear to auscultation BL without wheezing, rales or rhonchi; respirations unlabored  HEART: Regular rate and rhythm ,+S1/+S2, no murmurs, rubs, gallops  ABDOMEN: Soft, obese, nontender, no rebound, guarding rigidity, bowel sounds in all 4 quadrants  EXTREMITIES: Without any cyanosis, clubbing, rash, lesions or edema.  SKIN: No new rashes or lesions.  MSK: strength equal BL  NEUROLOGIC: Grossly intact.  PSYCH: No new changes.    10-29 @ 07:01  -  10-30 @ 07:00  --------------------------------------------------------  IN: 200 mL / OUT: 241 mL / NET: -41 mL          10-29    137  |  96<L>  |  24.0<H>  ----------------------------<  159<H>  4.3   |  31.0<H>  |  0.60    Ca    8.8      29 Oct 2017 05:46  Mg     2.3     10-29          Assessment- Rapid Response called for 62y year old Male with CIWA score of 21 and portraying signs of aggression.    Plan-  Patient given Librium during rapid  Haldol 1mg Q4H PRN for agitation  Continue current management of alcohol withdrawal  Discussed with SROC Dr. Sanders  Discussed case with nocturnist Dr. Vogt Rapid Response PGY 1 Note  Patient is a 62y old  Male admitted for alcohol intoxication. Patient signed out AMA this afternoon around 1:00PM and was found disoriented in the hospital around 4:30PM. Patient had gone out and drank 4-5 2oz drinks of scotch between that time. Rapid response was called due to CIWA score of 21, aggressive behavior including throwing chair at people. Patient is on 1 to 1 observation. Patient had ripped out his IV line earlier. Patient was given one dose of Librium during the rapid response.    Patient was seen and examined at the bedside by the rapid response team.    Allergies: NKDA    PAST MEDICAL & SURGICAL HISTORY:  Falls  Meningitis  Collapsed lung  Alcohol withdrawal  Emphysema of lung  ETOH abuse  Cirrhosis  Afib  CHF (congestive heart failure)  Poor historian  S/P BKA (below knee amputation), left: secondary to worsening infection in lower leg. Had both ankle injuries from fall s/p repair - left had complication.      Vital Signs Last 24 Hrs  T(C): 36.6 (30 Oct 2017 16:10), Max: 36.6 (30 Oct 2017 16:10)  T(F): 97.8 (30 Oct 2017 16:10), Max: 97.8 (30 Oct 2017 16:10)  HR: 87 (30 Oct 2017 20:06) (87 - 103)  BP: 118/74 (30 Oct 2017 20:06) (118/74 - 131/74)  BP(mean): --  RR: 18 (30 Oct 2017 16:10) (18 - 20)  SpO2: 96% (30 Oct 2017 16:10) (94% - 97%)    GENERAL: The patient is awake and alert. AAOx2, patient not aware of his location, states he is at his residence. Agitated state.  HEENT: Head is normocephalic and atraumatic. Extraocular muscles are intact. Mucous membranes are moist.   LUNGS: Clear to auscultation BL without wheezing, rales or rhonchi; respirations unlabored  HEART: Regular rate and rhythm ,+S1/+S2, no murmurs, rubs, gallops  ABDOMEN: Soft, obese, nontender, no rebound, guarding rigidity, bowel sounds in all 4 quadrants  EXTREMITIES: Without any cyanosis, clubbing, rash, lesions or edema.  SKIN: No new rashes or lesions.  MSK: strength equal BL  NEUROLOGIC: Grossly intact.      10-29 @ 07:01  -  10-30 @ 07:00  --------------------------------------------------------  IN: 200 mL / OUT: 241 mL / NET: -41 mL          10-29    137  |  96<L>  |  24.0<H>  ----------------------------<  159<H>  4.3   |  31.0<H>  |  0.60    Ca    8.8      29 Oct 2017 05:46  Mg     2.3     10-29          Assessment- Rapid Response called for 62y year old Male with CIWA score of 21 and portraying signs of aggression.    Plan-  Patient given Librium during rapid  Haldol 1mg Q4H PRN for agitation  Continue current management of alcohol withdrawal  Discussed with SROC Dr. Sanders  Discussed case with nocturnist Dr. Vogt

## 2017-10-30 NOTE — DISCHARGE NOTE ADULT - MEDICATION SUMMARY - MEDICATIONS TO STOP TAKING
I will STOP taking the medications listed below when I get home from the hospital:    traZODone 100 mg oral tablet  -- 1 tab(s) by mouth once a day (at bedtime)    Lasix 40 mg oral tablet  -- 1 tab(s) by mouth once a day

## 2017-10-30 NOTE — DISCHARGE NOTE ADULT - CARE PLAN
Principal Discharge DX:	Acute respiratory failure  Goal:	Trach care  Instructions for follow-up, activity and diet:	Follow up with lung doctorreece  Secondary Diagnosis:	Afib  Goal:	continue with current meds  Instructions for follow-up, activity and diet:	Follow up with cardiologist  Secondary Diagnosis:	Alcohol intoxication  Goal:	Counselled to stop  Secondary Diagnosis:	Altered mental status  Goal:	resolved  Secondary Diagnosis:	Aspiration pneumonia  Goal:	resolved  Secondary Diagnosis:	Cardiomyopathy, alcoholic  Goal:	Follow up with cardiologist  Secondary Diagnosis:	PEG (percutaneous endoscopic gastrostomy) status  Goal:	Peg feeding as discribed bellow

## 2017-10-30 NOTE — DISCHARGE NOTE ADULT - ADDITIONAL INSTRUCTIONS
Speech therapy recommended soft diet, no liquids and once patient is transfer to rehab he can be tried on Nector and thin liquid with teaspoon only first with speech therapy,  he has passed the swallow eval and has been started on diet, Nutritionist recommended nocturnal feed of Jevity 1.5 at 80 ml /hr from 6pm-6am (1440kcal, 61 g pro) this will provide ~60 % of calorie needs and 50% protein needs.   If PO intake is less than 50% at meals, bolus 240 ml of Jevity 1.5 to ensure 100% of needs are met.  Continue free water bolus

## 2017-10-30 NOTE — DISCHARGE NOTE ADULT - CARE PROVIDER_API CALL
Robby Aburto),   follow up in 2 week,  you need CT chest in 2 months please have you lung doctor follow on that  Phone: (   )    -  Fax: (   )    -    PCP,   Follow up with PCP in 1 week after discharge from Rehab  Phone: (   )    -  Fax: (   )    -

## 2017-10-30 NOTE — DISCHARGE NOTE ADULT - PATIENT PORTAL LINK FT
“You can access the FollowHealth Patient Portal, offered by [facility], by registering with the following website: [email or web address]”

## 2017-10-30 NOTE — DISCHARGE NOTE ADULT - SECONDARY DIAGNOSIS.
- 2D echo on 8/2/2017 demonstrating a normal EF with elevated pulmonary arterial pressures, enlarged atrial and elevated central venous pressure  - 2D Echo on 8/28 revealed EF 50%, moderate to severe TR, normally functioning bioprosthesis in aortic valve position.   - Continue lasix 100 mg daily  - aggressively replacing K.  - Will continue to monitor volume status and adjust accordingly   Afib Alcohol intoxication Altered mental status Aspiration pneumonia Cardiomyopathy, alcoholic PEG (percutaneous endoscopic gastrostomy) status

## 2017-10-30 NOTE — DISCHARGE NOTE ADULT - PLAN OF CARE
Trach care Follow up with lung doctor, duo nebs continue with current meds Follow up with cardiologist Counselled to stop resolved Peg feeding as discribed bellow

## 2017-10-30 NOTE — CHART NOTE - NSCHARTNOTEFT_GEN_A_CORE
Rapid response follow up note.     Patient is resting comfortably in bed, Spoke with the 1:1 at bed side who states that patient has been confused, but not combative over the last 2 hours.     Continue current treatment.     Tom Sanders MD PGY3

## 2017-10-30 NOTE — DISCHARGE NOTE ADULT - HOSPITAL COURSE
62M presented with dyspnea. He also reported a history of chronic orthopnea and lower extremity edema. He was noted to have been admitted to [facility] two months prior for congestive heart failure and COPD and possible diagnosis of cirrhosis. On presentation, H/H(12.3/36.7), BUN/Cr(21/0.59), Alk Phos(186), AST/ALT(57/24), BNP(1190), ETOH(310). Intravenous furosemide was initiated for acute on chronic diastolic heart failure. Xray of the left shoulder noted calcification of the left AC joint, no acute radiographic osseous pathology. The patient was seen by Cardiology in consultation and noted to be noncompliant with his medications. Magnesium was supplemented for hypomagnesemia and the patient was continued on chlordiazepoxide for alcohol withdrawal. The patient reported improvement in his symptoms and underwent a nuclear stress test. The patient later removed the saline lock and stated that he did not wish to pursue further treatment in the hospital. The patient later left against medical advice prior to the results of the stress test. 62 year old old male with atrial fibrillation, cirrhosis, CHF, alcohol abuse admitted with shortness of breath, admitted for COPD exacerbation, acute diastolic CHF and alcohol abuse, started on steroids, Librium protocol. Cardiology consulted. ECHO and stress test was ordered. He signed out AMA, returned to the hospital agitated, intoxicated. RRT was called later as he was in alcohol withdrawal, ICU was consulted, placed on Valium and Precedex, intubated for airway protection. He was given antibiotics for aspiration, he self extubated on 11/2, but was re intubated on 11/4 for airway protection and altered mental status. ECHO revealed cardiomyopathy, severely increased left ventricular internal cavity size, Left ventricular ejection fraction, by visual estimation, is 55 to 60%, severely dilated right atrium, severely enlarged left atrium, There is anterior mitral leaflet prolapse. There is moderate to severe posteriorly directed MR, there is no evidence of pericardial effusion, Mental status attributed to encephalopathy and alcohol withdrawal. EEG negative for seizures. He self extubated on 11/7, requiring reintubation. MRI brain unremarkable. He did not tolerate SBT given mental status and copious secretions, CT surgery was consulted for tracheostomy, which was performed on 11/10. Antibiotics changed to Aztreonam for pseudomonas pneumonia. He was transferred out of ICU, rate control medications adjusted for a fib with RVR.  He developed code sepsis on 11/13, he was readmitted to ICU for further management. GI consulted for PEG placement. Cardizem and Lopressor given for A fib with RVR, PEG placed on 11/22, digoxin was added for rate control, his rate remained controlled, he is not a candidate for anticoagulation, He was transferred out of medical ICU on 11/29. He developed hypotension on 11/30 after rate control medications were given, BP improved post fluids, heart rate improved.    Patient continues to suffer from confusion requiring constant observation, he was also drowsy and sleepy, Psych consult as he was on Psych meds, Psych adjusted meds, Patient became more alert, he is more alert on Neurontin but must remain under constant observation, psyche increased olanzapine discontinued xanax, he became more and more alert and now completed AOX3 and able to communicate normal conversation, patient was followed closely by Psych team and they d/yael 1:1 as his encephalopathy has been resolved.   For his respiratory failure, Trach care with suction and followed by RT closely, his Trach has been changed to 6mm Shiley uncuffed, he tolerated the procedure, no bleeding, he is able to communicate as well, he was continued with humidified air, patient was seen and followed by Pulmonary team, he had CT chest done early in the course, showed no pulmonary emboli, Mediastinal left hilar adenopathy, recommended follow up CT chest in 2 months.   Since he has uncuffed trach, speech therapy consult was called and patient got modified barium swallow, speech therapy recommended soft diet, no liquids and once patient is transfer to rehab he can be tried on Nector and thin liquid with teaspoon only first with speech therapy, patient was given diet here, he tolerated it well, patient was on Peg feeding only since he has passed the swallow eval and has been started on diet, Nutritionist recommended nocturnal feed of Jevity 1.5 at 80 ml /hr from 6pm-6am (1440kcal, 61 g pro) this will provide ~60 % of calorie needs and 50% protein needs.   If PO intake is less than 50% at meals, bolus 240 ml of Jevity 1.5 to ensure 100% of needs are met.  Continue free water bolus      patient is doing well, he is being discharge to acute inpatient rehab in a stable condition.     Vital Signs Last 24 Hrs  T(C): 36.7 (14 Dec 2017 08:24), Max: 37.3 (13 Dec 2017 17:29)  T(F): 98.1 (14 Dec 2017 08:24), Max: 99.1 (13 Dec 2017 17:29)  HR: 68 (14 Dec 2017 08:24) (54 - 82)  BP: 100/52 (14 Dec 2017 12:00) (87/59 - 129/72)  RR: 18 (14 Dec 2017 12:00) (18 - 19)  SpO2: 98% (14 Dec 2017 12:00) (97% - 99%)    PHYSICAL EXAM:    GENERAL: middle age male looking comfortable  HEENT: PERRL, +EOMI  NECK: tach in place   CHEST/LUNG: clear to auscultate bilaterally; No wheezing  HEART: S1S2+, Regular rate and rhythm; No murmurs  ABDOMEN: Soft, Nontender, Nondistended; Bowel sounds present  EXTREMITIES:  1+ Peripheral Pulses, No edema, left BKA  SKIN: No rashes or lesions  NEURO: AAOX3, no focal deficits.   PSYCH: Pleasant mood    Total time spent 60minutes

## 2017-10-30 NOTE — DISCHARGE NOTE ADULT - MEDICATION SUMMARY - MEDICATIONS TO TAKE
I will START or STAY ON the medications listed below when I get home from the hospital:    aspirin 81 mg oral tablet  -- 1 tab(s) by mouth once a day  -- Indication: For Prophylaxis    acetaminophen 160 mg/5 mL oral suspension  -- 20.31 milliliter(s) by mouth every 6 hours, As needed, For Temp greater than 38 C (100.4 F)  -- Indication: For Pain or fever     lisinopril 5 mg oral tablet  -- 1 tab(s) by mouth once a day  -- Indication: For HTN (hypertension)    tamsulosin 0.4 mg oral capsule  -- 1 cap(s) by mouth once a day (at bedtime)  -- Indication: For BPH    dilTIAZem 60 mg oral tablet  -- 1 tab(s) by mouth every 6 hours  -- Indication: For Afib     digoxin 125 mcg (0.125 mg) oral tablet  -- 1 tab(s) by mouth once a day  -- Indication: For Afib     gabapentin 250 mg/5 mL oral solution  -- 6 milliliter(s) by mouth every 8 hours  -- Indication: For Pain    OLANZapine 10 mg oral tablet  -- 1 tab(s) by mouth 2 times a day  -- Indication: For Psych    metoprolol tartrate 25 mg oral tablet  -- 1 tab(s) by mouth every 8 hours  -- Indication: For HTN (hypertension)    ipratropium-albuterol 0.5 mg-2.5 mg/3 mLinhalation solution  -- 3 milliliter(s) inhaled every 6 hours  -- Indication: For SOB     bisacodyl 10 mg rectal suppository  -- 1 suppository(ies) rectally once a day, As needed, Constipation  -- Indication: For Constipation    lactulose 10 g/15 mL oral syrup  -- 15 milliliter(s) by mouth once a day  -- Indication: For Constipation     pantoprazole 40 mg oral granule, enteric coated  -- 40 milligram(s) by mouth once a day  -- Indication: For GERD    Multiple Vitamins oral tablet  -- 1 tab(s) by mouth once a day  -- Indication: For Supplement     folic acid 1 mg oral tablet  -- 1 tab(s) by mouth once a day  -- Indication: For Supplement    thiamine 100 mg oral tablet  -- 1 tab(s) by mouth once a day  -- Indication: For Supplement

## 2017-10-30 NOTE — PROGRESS NOTE ADULT - SUBJECTIVE AND OBJECTIVE BOX
CLAVANE DIAZ  ----------------------------------------  The patient was seen and evaluated for COPD.  The patient is in no acute distress.  Denied any chest pain, palpitations, or abdominal pain.  Dyspnea improved.    Vital Signs Last 24 Hrs  T(C): 36.5 (29 Oct 2017 22:27), Max: 36.5 (29 Oct 2017 22:27)  T(F): 97.7 (29 Oct 2017 22:27), Max: 97.7 (29 Oct 2017 22:27)  HR: 94 (29 Oct 2017 22:27) (94 - 105)  BP: 130/60 (30 Oct 2017 06:15) (130/60 - 133/77)  BP(mean): --  RR: 20 (29 Oct 2017 22:27) (20 - 20)  SpO2: 94% (29 Oct 2017 22:27) (94% - 94%)    PHYSICAL EXAMINATION:  ----------------------------------------  General appearance: NAD, Awake, Alert  HEENT: NCAT, Conjunctiva clear, EOMI  Neck: Supple, No JVD, No tenderness  Lungs: Clear to auscultation, Breath sound equal bilaterally, No wheezes, No rales  Cardiovascular: S1S2, Regular rhythm  Abdomen: Soft, Nontender, Nondistended, No guarding/rebound, Positive bowel sounds  Extremities: No clubbing, No cyanosis, No calf tenderness, Mild right lower extremity edema, Left below knee amputation  Neuro: Strength equal bilaterally, Minimal tremors  Psychiatric: Appropriate mood, Normal affect    LABORATORY STUDIES:  ----------------------------------------  10-29    137  |  96<L>  |  24.0<H>  ----------------------------<  159<H>  4.3   |  31.0<H>  |  0.60    Ca    8.8      29 Oct 2017 05:46  Mg     2.3     10-29    MEDICATIONS  (STANDING):  ALBUTerol/ipratropium for Nebulization 3 milliLiter(s) Nebulizer every 6 hours  aspirin  chewable 81 milliGRAM(s) Oral daily  chlordiazePOXIDE   Oral   chlordiazePOXIDE 50 milliGRAM(s) Oral every 12 hours  enoxaparin Injectable 40 milliGRAM(s) SubCutaneous daily  folic acid 1 milliGRAM(s) Oral daily  furosemide    Tablet 20 milliGRAM(s) Oral daily  influenza   Vaccine 0.5 milliLiter(s) IntraMuscular once  lisinopril 5 milliGRAM(s) Oral daily  magnesium oxide 400 milliGRAM(s) Oral three times a day with meals  metoprolol succinate  milliGRAM(s) Oral daily  multivitamin 1 Tablet(s) Oral daily  nicotine - 21 mG/24Hr(s) Patch 1 patch Transdermal daily  pantoprazole    Tablet 40 milliGRAM(s) Oral before breakfast  potassium chloride    Tablet ER 20 milliEquivalent(s) Oral daily  predniSONE   Tablet 60 milliGRAM(s) Oral daily  thiamine 100 milliGRAM(s) Oral daily  traZODone 100 milliGRAM(s) Oral at bedtime    MEDICATIONS  (PRN):  acetaminophen   Tablet 650 milliGRAM(s) Oral every 6 hours PRN For Temp greater than 38 C (100.4 F)  acetaminophen   Tablet. 650 milliGRAM(s) Oral every 6 hours PRN Headache or Bodyache  ALBUTerol    0.083% 2.5 milliGRAM(s) Nebulizer every 4 hours PRN Shortness of Breath and/or Wheezing  chlordiazePOXIDE 50 milliGRAM(s) Oral every 1 hour PRN CIWA-Ar score 8 or greater  chlordiazePOXIDE 50 milliGRAM(s) Oral every 1 hour PRN Symptom-triggered: each CIWA -Ar score 8 or GREATER  ondansetron Injectable 4 milliGRAM(s) IV Push every 6 hours PRN Nausea and/or Vomiting  oxyCODONE    5 mG/acetaminophen 325 mG 1 Tablet(s) Oral every 4 hours PRN Severe Pain (7 - 10)      ASSESSMENT / PLAN:  ----------------------------------------  Acute diastolic heart failure - Improved with furosemide. On lisinopril and metoprolol. Awaiting stress test results.    Afib - No anticoagulation due to history of falls and alcohol abuse.     Alcohol withdrawal - On chlordiazepoxide taper. Alcohol and tobacco cessation discussed.    Hypomagnesemia - Magnesium supplemented with improvement on repeat studies.    COPD - Solumedrol converted to prednisone. Inhaled bronchodilator therapy as needed. Nicotine patch for smoking cessation.

## 2017-10-31 DIAGNOSIS — J44.9 CHRONIC OBSTRUCTIVE PULMONARY DISEASE, UNSPECIFIED: ICD-10-CM

## 2017-10-31 DIAGNOSIS — F10.231 ALCOHOL DEPENDENCE WITH WITHDRAWAL DELIRIUM: ICD-10-CM

## 2017-10-31 DIAGNOSIS — I48.91 UNSPECIFIED ATRIAL FIBRILLATION: ICD-10-CM

## 2017-10-31 DIAGNOSIS — I50.32 CHRONIC DIASTOLIC (CONGESTIVE) HEART FAILURE: ICD-10-CM

## 2017-10-31 LAB
ALBUMIN SERPL ELPH-MCNC: 3.2 G/DL — LOW (ref 3.3–5.2)
ALP SERPL-CCNC: 104 U/L — SIGNIFICANT CHANGE UP (ref 40–120)
ALT FLD-CCNC: 19 U/L — SIGNIFICANT CHANGE UP
ANION GAP SERPL CALC-SCNC: 9 MMOL/L — SIGNIFICANT CHANGE UP (ref 5–17)
AST SERPL-CCNC: 21 U/L — SIGNIFICANT CHANGE UP
BILIRUB SERPL-MCNC: 0.7 MG/DL — SIGNIFICANT CHANGE UP (ref 0.4–2)
BUN SERPL-MCNC: 28 MG/DL — HIGH (ref 8–20)
CALCIUM SERPL-MCNC: 8.6 MG/DL — SIGNIFICANT CHANGE UP (ref 8.6–10.2)
CHLORIDE SERPL-SCNC: 100 MMOL/L — SIGNIFICANT CHANGE UP (ref 98–107)
CO2 SERPL-SCNC: 31 MMOL/L — HIGH (ref 22–29)
CREAT SERPL-MCNC: 0.65 MG/DL — SIGNIFICANT CHANGE UP (ref 0.5–1.3)
GAS PNL BLDA: SIGNIFICANT CHANGE UP
GAS PNL BLDA: SIGNIFICANT CHANGE UP
GLUCOSE BLDC GLUCOMTR-MCNC: 97 MG/DL — SIGNIFICANT CHANGE UP (ref 70–99)
GLUCOSE SERPL-MCNC: 147 MG/DL — HIGH (ref 70–115)
HCT VFR BLD CALC: 36.9 % — LOW (ref 42–52)
HGB BLD-MCNC: 11.9 G/DL — LOW (ref 14–18)
MAGNESIUM SERPL-MCNC: 2 MG/DL — SIGNIFICANT CHANGE UP (ref 1.6–2.6)
MCHC RBC-ENTMCNC: 30.2 PG — SIGNIFICANT CHANGE UP (ref 27–31)
MCHC RBC-ENTMCNC: 32.2 G/DL — SIGNIFICANT CHANGE UP (ref 32–36)
MCV RBC AUTO: 93.7 FL — SIGNIFICANT CHANGE UP (ref 80–94)
PHOSPHATE SERPL-MCNC: 3.6 MG/DL — SIGNIFICANT CHANGE UP (ref 2.4–4.7)
PLATELET # BLD AUTO: 92 K/UL — LOW (ref 150–400)
POTASSIUM SERPL-MCNC: 4.2 MMOL/L — SIGNIFICANT CHANGE UP (ref 3.5–5.3)
POTASSIUM SERPL-SCNC: 4.2 MMOL/L — SIGNIFICANT CHANGE UP (ref 3.5–5.3)
PROT SERPL-MCNC: 6.3 G/DL — LOW (ref 6.6–8.7)
RBC # BLD: 3.94 M/UL — LOW (ref 4.6–6.2)
RBC # FLD: 17.4 % — HIGH (ref 11–15.6)
SODIUM SERPL-SCNC: 140 MMOL/L — SIGNIFICANT CHANGE UP (ref 135–145)
WBC # BLD: 9.1 K/UL — SIGNIFICANT CHANGE UP (ref 4.8–10.8)
WBC # FLD AUTO: 9.1 K/UL — SIGNIFICANT CHANGE UP (ref 4.8–10.8)

## 2017-10-31 PROCEDURE — 71010: CPT | Mod: 26

## 2017-10-31 PROCEDURE — 99233 SBSQ HOSP IP/OBS HIGH 50: CPT

## 2017-10-31 PROCEDURE — 70450 CT HEAD/BRAIN W/O DYE: CPT | Mod: 26

## 2017-10-31 PROCEDURE — 99291 CRITICAL CARE FIRST HOUR: CPT

## 2017-10-31 RX ORDER — CHLORHEXIDINE GLUCONATE 213 G/1000ML
15 SOLUTION TOPICAL
Qty: 0 | Refills: 0 | Status: DISCONTINUED | OUTPATIENT
Start: 2017-10-31 | End: 2017-11-03

## 2017-10-31 RX ORDER — ETOMIDATE 2 MG/ML
20 INJECTION INTRAVENOUS ONCE
Qty: 0 | Refills: 0 | Status: COMPLETED | OUTPATIENT
Start: 2017-10-31 | End: 2017-10-31

## 2017-10-31 RX ORDER — DEXMEDETOMIDINE HYDROCHLORIDE IN 0.9% SODIUM CHLORIDE 4 UG/ML
0.3 INJECTION INTRAVENOUS
Qty: 200 | Refills: 0 | Status: DISCONTINUED | OUTPATIENT
Start: 2017-10-31 | End: 2017-11-03

## 2017-10-31 RX ORDER — PROPOFOL 10 MG/ML
20 INJECTION, EMULSION INTRAVENOUS ONCE
Qty: 0 | Refills: 0 | Status: COMPLETED | OUTPATIENT
Start: 2017-10-31 | End: 2017-10-31

## 2017-10-31 RX ORDER — HALOPERIDOL DECANOATE 100 MG/ML
2 INJECTION INTRAMUSCULAR EVERY 6 HOURS
Qty: 0 | Refills: 0 | Status: DISCONTINUED | OUTPATIENT
Start: 2017-10-31 | End: 2017-11-01

## 2017-10-31 RX ORDER — THIAMINE MONONITRATE (VIT B1) 100 MG
100 TABLET ORAL EVERY 8 HOURS
Qty: 0 | Refills: 0 | Status: DISCONTINUED | OUTPATIENT
Start: 2017-10-31 | End: 2017-11-01

## 2017-10-31 RX ADMIN — DEXMEDETOMIDINE HYDROCHLORIDE IN 0.9% SODIUM CHLORIDE 7.82 MICROGRAM(S)/KG/HR: 4 INJECTION INTRAVENOUS at 10:59

## 2017-10-31 RX ADMIN — Medication 50 MILLIGRAM(S): at 03:17

## 2017-10-31 RX ADMIN — HALOPERIDOL DECANOATE 1 MILLIGRAM(S): 100 INJECTION INTRAMUSCULAR at 03:34

## 2017-10-31 RX ADMIN — Medication 3 MILLILITER(S): at 21:30

## 2017-10-31 RX ADMIN — HALOPERIDOL DECANOATE 2 MILLIGRAM(S): 100 INJECTION INTRAMUSCULAR at 15:35

## 2017-10-31 RX ADMIN — DEXMEDETOMIDINE HYDROCHLORIDE IN 0.9% SODIUM CHLORIDE 7.82 MICROGRAM(S)/KG/HR: 4 INJECTION INTRAVENOUS at 15:35

## 2017-10-31 RX ADMIN — Medication 50 MILLIGRAM(S): at 01:04

## 2017-10-31 RX ADMIN — Medication 3 MILLILITER(S): at 08:17

## 2017-10-31 RX ADMIN — PROPOFOL 20 MILLIGRAM(S): 10 INJECTION, EMULSION INTRAVENOUS at 20:15

## 2017-10-31 RX ADMIN — Medication 81 MILLIGRAM(S): at 13:14

## 2017-10-31 RX ADMIN — Medication 3 MILLILITER(S): at 15:19

## 2017-10-31 RX ADMIN — Medication 50 MILLIGRAM(S): at 02:09

## 2017-10-31 RX ADMIN — Medication 1 PATCH: at 12:30

## 2017-10-31 RX ADMIN — Medication 100 MILLIGRAM(S): at 22:28

## 2017-10-31 RX ADMIN — HALOPERIDOL DECANOATE 2 MILLIGRAM(S): 100 INJECTION INTRAMUSCULAR at 08:15

## 2017-10-31 RX ADMIN — DEXMEDETOMIDINE HYDROCHLORIDE IN 0.9% SODIUM CHLORIDE 7.82 MICROGRAM(S)/KG/HR: 4 INJECTION INTRAVENOUS at 13:15

## 2017-10-31 RX ADMIN — ENOXAPARIN SODIUM 40 MILLIGRAM(S): 100 INJECTION SUBCUTANEOUS at 12:31

## 2017-10-31 RX ADMIN — Medication 50 MILLIGRAM(S): at 12:36

## 2017-10-31 RX ADMIN — ETOMIDATE 20 MILLIGRAM(S): 2 INJECTION INTRAVENOUS at 20:00

## 2017-10-31 NOTE — CONSULT NOTE ADULT - ATTENDING COMMENTS
Pt admitted overnight by [facility] as supervised by eICU physician. I have independently examined this patient this morning and agree with above assessment and plan.

## 2017-10-31 NOTE — CHART NOTE - NSCHARTNOTEFT_GEN_A_CORE
Rapid Response PGY 2 Note  This is the second rapid response for this 62y old  Male admitted for alcohol intoxication. Patient signed out AMA this afternoon around 1:00PM and was found disoriented in the hospital around 4:30PM. Patient had gone out and drank 4-5 2oz drinks of scotch between that time. Rapid response was called due to CIWA score of 16, agitated behavior, patient walking around our of his room. Patient is on 1 to 1 observation. Patient had ripped out his IV line earlier. Patient was given one dose of Haldol IM 1mg during the rapid response. ICU MERRILL Forrest came to consult patient, and agreed to take him to ICU.     Patient was seen and examined at the bedside by the rapid response team.    Allergies: NKDA    PAST MEDICAL & SURGICAL HISTORY:  Falls  Meningitis  Collapsed lung  Alcohol withdrawal  Emphysema of lung  ETOH abuse  Cirrhosis  Afib  CHF (congestive heart failure)  Poor historian  S/P BKA (below knee amputation), left: secondary to worsening infection in lower leg. Had both ankle injuries from fall s/p repair - left had complication.      Vital Signs Last 24 Hrs  T(C): 36.5 (31 Oct 2017 02:29), Max: 36.6 (30 Oct 2017 16:10)  T(F): 97.7 (31 Oct 2017 02:29), Max: 97.8 (30 Oct 2017 16:10)  HR: 91 (31 Oct 2017 02:29) (52 - 103)  BP: 137/82 (31 Oct 2017 02:29) (105/73 - 137/82)  BP(mean): --  RR: 20 (31 Oct 2017 02:29) (18 - 20)  SpO2: 96% (30 Oct 2017 16:10) (96% - 97%)    GENERAL: The patient is awake and alert. AAOx2, patient not aware of his location, states he is at his residence. Agitated state.  LUNGS: Clear to auscultation BL without wheezing, rales or rhonchi; respirations unlabored  HEART: Regular rate and rhythm ,+S1/+S2, no murmurs, rubs, gallops  ABDOMEN: Soft, obese, nontender, no rebound, guarding rigidity, bowel sounds in all 4 quadrants        10-29 @ 07:01  -  10-30 @ 07:00  --------------------------------------------------------  IN: 200 mL / OUT: 241 mL / NET: -41 mL          10-29    137  |  96<L>  |  24.0<H>  ----------------------------<  159<H>  4.3   |  31.0<H>  |  0.60    Ca    8.8      29 Oct 2017 05:46  Mg     2.3     10-29          Assessment- Rapid Response called for 62y year old Male with CIWA score of 16 and portraying signs of agitation.    Plan-  Patient given Haldol during rapid  ICU MERRILL Forrest came to consult patient, and agreed to take him to ICU for higher level of care.     Discussed case with nocturnist Dr. Vogt at bedside who agrees with plan.    Tom Sanders MD PGY3

## 2017-10-31 NOTE — PROGRESS NOTE ADULT - SUBJECTIVE AND OBJECTIVE BOX
Patient is a 62y old  Male who presents with a chief complaint of Shortness of breath (30 Oct 2017 15:03)    PAST MEDICAL & SURGICAL HISTORY:  Falls  Meningitis  Collapsed lung  Alcohol withdrawal  Emphysema of lung  ETOH abuse  Cirrhosis  Afib  CHF (congestive heart failure)  Poor historian  Alcohol abuse  Chronic atrial fibrillation  S/P BKA (below knee amputation), left: secondary to worsening infection in lower leg. Had both ankle injuries from fall s/p repair - left had complication.  S/P BKA (below knee amputation) unilateral, left    CLATUS CARUANA   62y    Male    BRIEF HOSPITAL COURSE:  62 years old male with PMH of A. Fib (not on AC due to falls), CHF, COPD and Alcoholism brought by EMS with shortness of breath. As per patient, he was drinking alcohol earlier today when he started having difficulty in breathing - so he called EMS. Denied chest pain, palpitations, cough, nausea, vomiting or dizziness. He had orthopnea and paroxysmal nocturnal dyspnea which were thought to be chronic. He was admitted for alcohol intoxication but then he signed out AMA yesterday  afternoon around 1:00PM and was found disoriented in the hospital around 4:30PM. Its reported that he had gone out and drank 4-5 2oz drinks of scotch between that time. Once back in hospital and on 5th floor he was a rapid response at aprox 19:30 due to CIWA score of 21 and aggressive behavior including throwing chair at people.  Patient had ripped out his IV line as well . Patient was given one dose of Librium during that rapid response, ICU was not called.     Second rapid response called for severe ETOH withdrawal, combative delirious.  RX with LA benzo's and precedex.  Patient obtunded now not protecting airway no response to deep pain         Review of Systems:    UATO obtunded                        ICU Vital Signs Last 24 Hrs  T(C): 36.4 (31 Oct 2017 16:48), Max: 38.1 (31 Oct 2017 08:00)  T(F): 97.6 (31 Oct 2017 16:48), Max: 100.6 (31 Oct 2017 08:00)  HR: 92 (31 Oct 2017 20:44) (52 - 164)  BP: 94/53 (31 Oct 2017 19:00) (94/53 - 169/84)  BP(mean): 67 (31 Oct 2017 19:00) (67 - 120)  ABP: --  ABP(mean): --  RR: 17 (31 Oct 2017 19:00) (16 - 36)  SpO2: 97% (31 Oct 2017 20:44) (91% - 100%)    Physical Examination:    General:  obtunded      HEENT:   perrl  varying from 2-4 MM but equal     PULM:  bilateral BS poor airmovement     CVS: s1 s2 ref   3/6 HERMILA LSB    ABD:  soft     EXT: L BKA     SKIN: warm    Neuro: as above, no wihtdrawal to pain,      ABG - ( 31 Oct 2017 19:58 )  pH: 7.32  /  pCO2: 70    /  pO2: 73    / HCO3: 31    / Base Excess: 7.6   /  SaO2: 92            Mode: AC/ CMV (Assist Control/ Continuous Mandatory Ventilation)  RR (machine): 14  TV (machine): 450  FiO2: 40  PEEP: 5  MAP: 10  PIP: 28    Mode: AC/ CMV (Assist Control/ Continuous Mandatory Ventilation), RR (machine): 14, TV (machine): 450, FiO2: 40, PEEP: 5, MAP: 10, PIP: 28  LABS:                        11.9   9.1   )-----------( 92       ( 31 Oct 2017 06:03 )             36.9     10-31    140  |  100  |  28.0<H>  ----------------------------<  147<H>  4.2   |  31.0<H>  |  0.65    Ca    8.6      31 Oct 2017 06:03  Phos  3.6     10-31  Mg     2.0     10-31    TPro  6.3<L>  /  Alb  3.2<L>  /  TBili  0.7  /  DBili  x   /  AST  21  /  ALT  19  /  AlkPhos  104  10-31      CAPILLARY BLOOD GLUCOSE      POCT Blood Glucose.: 97 mg/dL (31 Oct 2017 19:50)    Medications:    furosemide    Tablet 20 milliGRAM(s) Oral daily  lisinopril 5 milliGRAM(s) Oral daily  metoprolol succinate  milliGRAM(s) Oral daily  ALBUTerol    0.083% 2.5 milliGRAM(s) Nebulizer every 4 hours PRN  ALBUTerol/ipratropium for Nebulization 3 milliLiter(s) Nebulizer every 6 hours  acetaminophen   Tablet 650 milliGRAM(s) Oral every 6 hours PRN  acetaminophen   Tablet. 650 milliGRAM(s) Oral every 6 hours PRN  chlordiazePOXIDE 100 milliGRAM(s) Oral every 8 hours  dexmedetomidine Infusion 0.3 MICROgram(s)/kG/Hr IV Continuous <Continuous>  etomidate Injectable 20 milliGRAM(s) IV Push once  haloperidol    Injectable 2 milliGRAM(s) IV Push every 6 hours PRN  LORazepam   Injectable 4 milliGRAM(s) IV Push every 4 hours PRN  ondansetron Injectable 4 milliGRAM(s) IV Push every 6 hours PRN  propofol Injectable 20 milliGRAM(s) IV Push once  traZODone 100 milliGRAM(s) Oral at bedtime  aspirin  chewable 81 milliGRAM(s) Oral daily  enoxaparin Injectable 40 milliGRAM(s) SubCutaneous daily  folic acid 1 milliGRAM(s) Oral daily  multivitamin 1 Tablet(s) Oral daily  potassium chloride    Tablet ER 20 milliEquivalent(s) Oral daily  thiamine 100 milliGRAM(s) Oral every 8 hours  influenza   Vaccine 0.5 milliLiter(s) IntraMuscular once  chlorhexidine 0.12% Liquid 15 milliLiter(s) Swish and Spit two times a day  nicotine - 21 mG/24Hr(s) Patch 1 patch Transdermal daily      10-31 @ 07:01  -  10-31 @ 20:48  --------------------------------------------------------  IN: 183 mL / OUT: 625 mL / NET: -442 mL      RADIOLOGY/IMAGING/ECHO    < from: TTE Echo Complete w/Doppler (10.31.17 @ 09:26) >  Summary:   1. Limited bedside study due to patient's combative state.   2. Left ventricular ejection fraction, by visual estimation, is 55 to   60%.   3. Normal global left ventricular systolic function.   4. The mitral in-flow pattern reveals no discernable A-wave, therefore   no comment on diastolic function can be made.   5. Moderately enlarged right ventricle.   6. Severely dilated right atrium.   7. Moderate to severe mitral valve regurgitation.   8. Moderate tricuspid regurgitation.   9. Dilated pulmonary artery.  10. Severely enlarged left atrium.  11. There is no evidence of pericardial effusion.  12. Estimated pulmonary artery systolic pressure is 67.6 mmHg assuming a   right atrial pressure of 15 mmHg, which is consistent with severe   pulmonary hypertension.        Assessment/Plan:    62 years old male with PMH of A. Fib (not on AC due to falls), CHF, COPD and Alcoholism.  In ICU for severe ETOH withdrawal, now obtunded.   ABG not fully explainig AMS.      Acute on chronic hypercapnic resp failure.  Obtunded to deep pain not protecting airway.  Hold benzo's.  Use precedex as sole agent if needed until fullay awake.     Difficult intubation cords easily visualized, difficult to pass tube into trachea,  previous trach, suspect SG stenosis      IF dose not wake up to baseline within 30 minutes will CT scan the head.    Thrombocytopenia , PLT count up from yesterday arguing against HIT        D/M Dr Ponce   E-ICU   will notify family.    Minutes of Critical Care time: 45  (Reviewing data, imaging, discussing with multidisciplinary team, non inclusive of procedures, discussing goals of care with patient/family)

## 2017-10-31 NOTE — PROGRESS NOTE ADULT - PROBLEM SELECTOR PLAN 3
echo performed -f/u results  pt remains on cardiac meds including lasix but has missed doses due to his agitation  clinically not in failure

## 2017-10-31 NOTE — CONSULT NOTE ADULT - PROBLEM SELECTOR RECOMMENDATION 9
continue librium  continue valium   will consider Precedex if agitation continues as added medication to valium continue librium  continue valium   will consider Precedex if agitation continues as added medication to valium  Thiamine  Folic acid

## 2017-10-31 NOTE — PROGRESS NOTE ADULT - SUBJECTIVE AND OBJECTIVE BOX
Patient is a 62y old  Male who presents with a chief complaint of Shortness of breath (30 Oct 2017 15:03)      BRIEF HOSPITAL COURSE: Pt was agitated with tremors to hands and head with confusion, slightly tachycardic. Pt has been getting librium PO by the primary team but with increasing CIWA scores and increased agitation the need for benzodiazapine is great . The patient is accepted to ICU for alcohol withdrawal delirium with constant one to one observation and hemodynamic monitoring     Events last 24 hours: increasing agitation    PAST MEDICAL & SURGICAL HISTORY:  Falls  Meningitis  Collapsed lung  Alcohol withdrawal  Emphysema of lung  ETOH abuse  Cirrhosis  Afib  CHF (congestive heart failure)  Poor historian  Alcohol abuse  Chronic atrial fibrillation  S/P BKA (below knee amputation), left: secondary to worsening infection in lower leg. Had both ankle injuries from fall s/p repair - left had complication.  S/P BKA (below knee amputation) unilateral, left      Review of Systems:  Cannot be obtained as pt is not cooperative and cursing, refusing to answer questions or follow directions     Medications:    furosemide    Tablet 20 milliGRAM(s) Oral daily  lisinopril 5 milliGRAM(s) Oral daily  metoprolol succinate  milliGRAM(s) Oral daily    ALBUTerol    0.083% 2.5 milliGRAM(s) Nebulizer every 4 hours PRN  ALBUTerol/ipratropium for Nebulization 3 milliLiter(s) Nebulizer every 6 hours    acetaminophen   Tablet 650 milliGRAM(s) Oral every 6 hours PRN  acetaminophen   Tablet. 650 milliGRAM(s) Oral every 6 hours PRN  chlordiazePOXIDE 100 milliGRAM(s) Oral every 8 hours  chlordiazePOXIDE 100 milliGRAM(s) Oral every 8 hours  dexmedetomidine Infusion 0.3 MICROgram(s)/kG/Hr IV Continuous <Continuous>  diazepam  Injectable 5 milliGRAM(s) IV Push every 6 hours  haloperidol    Injectable 2 milliGRAM(s) IV Push every 6 hours PRN  ondansetron Injectable 4 milliGRAM(s) IV Push every 6 hours PRN  traZODone 100 milliGRAM(s) Oral at bedtime      aspirin  chewable 81 milliGRAM(s) Oral daily  enoxaparin Injectable 40 milliGRAM(s) SubCutaneous daily          folic acid 1 milliGRAM(s) Oral daily  multivitamin 1 Tablet(s) Oral daily  potassium chloride    Tablet ER 20 milliEquivalent(s) Oral daily  thiamine 100 milliGRAM(s) Oral every 8 hours    influenza   Vaccine 0.5 milliLiter(s) IntraMuscular once      nicotine - 21 mG/24Hr(s) Patch 1 patch Transdermal daily          ICU Vital Signs Last 24 Hrs  T(C): 37 (31 Oct 2017 12:01), Max: 38.1 (31 Oct 2017 08:00)  T(F): 98.6 (31 Oct 2017 12:01), Max: 100.6 (31 Oct 2017 08:00)  HR: 110 (31 Oct 2017 15:19) (52 - 164)  BP: 111/79 (31 Oct 2017 14:00) (99/78 - 169/84)  BP(mean): 99 (31 Oct 2017 14:00) (73 - 120)  ABP: --  ABP(mean): --  RR: 24 (31 Oct 2017 14:00) (16 - 35)  SpO2: 98% (31 Oct 2017 15:19) (91% - 100%)          I&O's Detail    31 Oct 2017 07:01  -  31 Oct 2017 15:32  --------------------------------------------------------  IN:    dexmedetomidine Infusion: 18.3 mL  Total IN: 18.3 mL    OUT:    Voided: 625 mL  Total OUT: 625 mL    Total NET: -606.7 mL            LABS:                        11.9   9.1   )-----------( 92       ( 31 Oct 2017 06:03 )             36.9     10-31    140  |  100  |  28.0<H>  ----------------------------<  147<H>  4.2   |  31.0<H>  |  0.65    Ca    8.6      31 Oct 2017 06:03  Phos  3.6     10-31  Mg     2.0     10-31    TPro  6.3<L>  /  Alb  3.2<L>  /  TBili  0.7  /  DBili  x   /  AST  21  /  ALT  19  /  AlkPhos  104  10-31          CAPILLARY BLOOD GLUCOSE            CULTURES:      Physical Examination:    General: No acute distress.      HEENT: Pupils equal, reactive to light.  Symmetric.    PULM: scattered wheeze to auscultation bilaterally, no significant sputum production    CVS: tachycardic, irregular     ABD: Soft, nondistended, nontender, normoactive bowel sounds, no masses    EXT: No edema, nontender, BKA left LE    SKIN: Warm and well perfused, no rashes noted.    NEURO: Agitated, uncooperative/not following commands, [place]    RADIOLOGY:     CRITICAL CARE TIME SPENT: 60

## 2017-10-31 NOTE — PROGRESS NOTE ADULT - ATTENDING COMMENTS
Pt remains in DTs and is labile with agitation and VS thus critically ill and will remain in ICU today.   Pt's daughter Adela Boone updated via phone (number on chart), she is coming in tomorrow.

## 2017-10-31 NOTE — PROGRESS NOTE ADULT - PROBLEM SELECTOR PLAN 1
pt initially responded to Valium but has been increasingly more agitated despite standing dose of Valium-Precedex gtts started- was calm enough to take PO Librium but requires higher dose. orders adjusted will continue to evaluate regimen

## 2017-10-31 NOTE — CONSULT NOTE ADULT - ASSESSMENT
62 year old male admitted to ICU for alcohol withdraw delirium with agitation     Critical Care time: 35 mins assessing presenting problems of acute illness that poses high probability of life threatening deterioration or end organ damage/dysfunction.  Medical decision making inculding Initiating plan of care, reviewing data, reviewing radiology, discussing with multidisciplinary team, non inclusive of procedures, discussing goals of care with patient/family

## 2017-10-31 NOTE — CONSULT NOTE ADULT - SUBJECTIVE AND OBJECTIVE BOX
62y  Male  No Known Allergies    cc: Patient is a 62y old  Male who presented with a chief complaint of Shortness of breath     HPI:   62 years old male with PMH of A. Fib (not on AC due to falls), CHF, COPD and Alcoholism brought by EMS with shortness of breath. As per patient, he was drinking alcohol earlier today when he started having difficulty in breathing - so he called EMS. Denied chest pain, palpitations, cough, nausea, vomiting or dizziness. He had orthopnea and paroxysmal nocturnal dyspnea which were thought to be chronic. He was admitted for alcohol intoxication but then he signed out AMA yesterday  afternoon around 1:00PM and was found disoriented in the hospital around 4:30PM. Patient had gone out and drank 4-5 2oz drinks of scotch between that time. Rapid response was called due to CIWA score of 21 earlier for aggressive behavior including throwing chair at people.  Patient had ripped out his IV line earlier. Patient was given one dose of Librium during the rapid response.    currently     PAST MEDICAL & SURGICAL HISTORY:  Falls  Meningitis  Collapsed lung  Alcohol withdrawal  Emphysema of lung  ETOH abuse  Cirrhosis  Afib  CHF (congestive heart failure)  Poor historian  S/P BKA (below knee amputation), left: secondary to worsening infection in lower leg. Had both ankle injuries from fall s/p repair - left had complication.    FAMILY HISTORY:  Family history unknown: Adopted      Vitals   Vital Signs Last 24 Hrs  T(C): 36.4 (31 Oct 2017 04:03), Max: 36.6 (30 Oct 2017 16:10)  T(F): 97.6 (31 Oct 2017 04:03), Max: 97.8 (30 Oct 2017 16:10)  HR: 87 (31 Oct 2017 04:03) (52 - 103)  BP: 121/69 (31 Oct 2017 04:03) (105/73 - 137/82)  BP(mean): 89 (31 Oct 2017 04:03) (89 - 89)  RR: 24 (31 Oct 2017 04:03) (18 - 24)  SpO2: 94% (31 Oct 2017 04:03) (94% - 97%)    I&O's Summary    29 Oct 2017 07:01  -  30 Oct 2017 07:00  --------------------------------------------------------  IN: 200 mL / OUT: 241 mL / NET: -41 mL        LABS  10-29    137  |  96<L>  |  24.0<H>  ----------------------------<  159<H>  4.3   |  31.0<H>  |  0.60    Ca    8.8      29 Oct 2017 05:46  Mg     2.3     10-29                CAPILLARY BLOOD GLUCOSE            VENT SETTINGS       Meds  MEDICATIONS  (STANDING):  ALBUTerol/ipratropium for Nebulization 3 milliLiter(s) Nebulizer every 6 hours  aspirin  chewable 81 milliGRAM(s) Oral daily  chlordiazePOXIDE 50 milliGRAM(s) Oral once  chlordiazePOXIDE   Oral   chlordiazePOXIDE 50 milliGRAM(s) Oral every 12 hours  diazepam  Injectable 10 milliGRAM(s) IV Push once  enoxaparin Injectable 40 milliGRAM(s) SubCutaneous daily  folic acid 1 milliGRAM(s) Oral daily  furosemide    Tablet 20 milliGRAM(s) Oral daily  influenza   Vaccine 0.5 milliLiter(s) IntraMuscular once  lisinopril 5 milliGRAM(s) Oral daily  metoprolol succinate  milliGRAM(s) Oral daily  multivitamin 1 Tablet(s) Oral daily  nicotine - 21 mG/24Hr(s) Patch 1 patch Transdermal daily  pantoprazole    Tablet 40 milliGRAM(s) Oral before breakfast  potassium chloride    Tablet ER 20 milliEquivalent(s) Oral daily  predniSONE   Tablet 60 milliGRAM(s) Oral daily  traZODone 100 milliGRAM(s) Oral at bedtime      REVIEW OF SYSTEMS:    CONSTITUTIONAL: No fever, weight loss, or fatigue  EYES: No eye pain, visual disturbances, or discharge  ENMT:  No difficulty hearing, tinnitus, vertigo; No sinus or throat pain  NECK: No pain or stiffness  BREASTS: No pain, masses, or nipple discharge  RESPIRATORY: No cough, wheezing, chills or hemoptysis; No shortness of breath  CARDIOVASCULAR: No chest pain, palpitations, dizziness, or leg swelling  GASTROINTESTINAL: No abdominal or epigastric pain. No nausea, vomiting, or hematemesis; No diarrhea or constipation. No melena or hematochezia.  GENITOURINARY: No dysuria, frequency, hematuria, or incontinence  NEUROLOGICAL: No headaches, memory loss, loss of strength, numbness, or tremors  SKIN: No itching, burning, rashes, or lesions   LYMPH NODES: No enlarged glands  ENDOCRINE: No heat or cold intolerance; No hair loss  MUSCULOSKELETAL: No joint pain or swelling; No muscle, back, or extremity pain  PSYCHIATRIC: No depression, anxiety, mood swings, or difficulty sleeping  HEME/LYMPH: No easy bruising, or bleeding gums  ALLERY AND IMMUNOLOGIC: No hives or eczema      Physicial Exam:     Constitutional: NAD, well-groomed, well-developed  HEENT: PERRLA, EOMI, no drainage or redness  Neck: No bruits; no thyromegaly or nodules,  No JVD  Back: Normal spine flexure, No CVA tenderness, No deformity or limitation of movement  Respiratory: Breath Sounds equal & clear to percussion & auscultation, no accessory muscle use  Cardiovascular: Regular rate & rhythm, normal S1, S2; no murmurs, gallops or rubs; no S3, S4  Gastrointestinal: Soft, non-tender, non distended no hepatosplenomegaly, normal bowel sounds  Extremities: No peripheral edema, No cyanosis, clubbing   Vascular: Equal and normal pulses: 2+ peripheral pulses throughout  Neurological: GCS:    A&O x 3; no sensory, motor  deficits, normal reflexes  Psychiatric: Normal mood, normal affect  Musculoskeletal: No joint pain, swelling or deformity; no limitation of movement  Skin: No rashes 62y  Male  No Known Allergies    cc: Patient is a 62y old  Male who presented with a chief complaint of Shortness of breath     HPI:   62 years old male with PMH of A. Fib (not on AC due to falls), CHF, COPD and Alcoholism brought by EMS with shortness of breath. As per patient, he was drinking alcohol earlier today when he started having difficulty in breathing - so he called EMS. Denied chest pain, palpitations, cough, nausea, vomiting or dizziness. He had orthopnea and paroxysmal nocturnal dyspnea which were thought to be chronic. He was admitted for alcohol intoxication but then he signed out AMA yesterday  afternoon around 1:00PM and was found disoriented in the hospital around 4:30PM. Its reported that he had gone out and drank 4-5 2oz drinks of scotch between that time. Once back in hospital and on 5th floor he was a rapid response at aprox 19:30 due to CIWA score of 21 and aggressive behavior including throwing chair at people.  Patient had ripped out his IV line as well . Patient was given one dose of Librium during that rapid response, ICU was not called.     Currently:  A second rapid response called tonight for agitation and delirium.  I was called to evaluate pt and find him out of bed in hallway stating he sees squirrels and that his brother needs to rescue him. Pt was agitated with tremors to hands and head with confusion, slightly tachycardic. Pt has been getting librium PO by the primary team but with increasing CIWA scores and increased agitation the need for benzodiazapine is great . The patient is accepted to ICU for alcohol withdrawal delirium with constant one to one observation and hemodynamic monitoring     PAST MEDICAL & SURGICAL HISTORY:  Falls  Meningitis  Collapsed lung  Alcohol withdrawal  Emphysema of lung  ETOH abuse  Cirrhosis  Afib  CHF (congestive heart failure)  Poor historian  S/P BKA (below knee amputation), left: secondary to worsening infection in lower leg. Had both ankle injuries from fall s/p repair - left had complication.    FAMILY HISTORY:  Family history unknown: Adopted      Vitals   Vital Signs Last 24 Hrs  T(C): 36.4 (31 Oct 2017 04:03), Max: 36.6 (30 Oct 2017 16:10)  T(F): 97.6 (31 Oct 2017 04:03), Max: 97.8 (30 Oct 2017 16:10)  HR: 87 (31 Oct 2017 04:03) (52 - 103)  BP: 121/69 (31 Oct 2017 04:03) (105/73 - 137/82)  BP(mean): 89 (31 Oct 2017 04:03) (89 - 89)  RR: 24 (31 Oct 2017 04:03) (18 - 24)  SpO2: 94% (31 Oct 2017 04:03) (94% - 97%)    I&O's Summary    29 Oct 2017 07:01  -  30 Oct 2017 07:00  --------------------------------------------------------  IN: 200 mL / OUT: 241 mL / NET: -41 mL        LABS  10-29    137  |  96<L>  |  24.0<H>  ----------------------------<  159<H>  4.3   |  31.0<H>  |  0.60    Ca    8.8      29 Oct 2017 05:46  Mg     2.3     10-29                CAPILLARY BLOOD GLUCOSE            VENT SETTINGS       Meds  MEDICATIONS  (STANDING):  ALBUTerol/ipratropium for Nebulization 3 milliLiter(s) Nebulizer every 6 hours  aspirin  chewable 81 milliGRAM(s) Oral daily  chlordiazePOXIDE 50 milliGRAM(s) Oral once  chlordiazePOXIDE   Oral   chlordiazePOXIDE 50 milliGRAM(s) Oral every 12 hours  diazepam  Injectable 10 milliGRAM(s) IV Push once  enoxaparin Injectable 40 milliGRAM(s) SubCutaneous daily  folic acid 1 milliGRAM(s) Oral daily  furosemide    Tablet 20 milliGRAM(s) Oral daily  influenza   Vaccine 0.5 milliLiter(s) IntraMuscular once  lisinopril 5 milliGRAM(s) Oral daily  metoprolol succinate  milliGRAM(s) Oral daily  multivitamin 1 Tablet(s) Oral daily  nicotine - 21 mG/24Hr(s) Patch 1 patch Transdermal daily  pantoprazole    Tablet 40 milliGRAM(s) Oral before breakfast  potassium chloride    Tablet ER 20 milliEquivalent(s) Oral daily  predniSONE   Tablet 60 milliGRAM(s) Oral daily  traZODone 100 milliGRAM(s) Oral at bedtime      REVIEW OF SYSTEMS:    CONSTITUTIONAL: No fever, weight loss, or fatigue  EYES: No eye pain, visual disturbances, or discharge  ENMT:  No difficulty hearing, tinnitus, vertigo; No sinus or throat pain  NECK: No pain or stiffness  BREASTS: No pain, masses, or nipple discharge  RESPIRATORY: No cough, wheezing, chills or hemoptysis; No shortness of breath  CARDIOVASCULAR: No chest pain, palpitations, dizziness, or leg swelling  GASTROINTESTINAL: No abdominal or epigastric pain. No nausea, vomiting, or hematemesis; No diarrhea or constipation. No melena or hematochezia.  GENITOURINARY: No dysuria, frequency, hematuria, or incontinence  NEUROLOGICAL: No headaches, memory loss, loss of strength, numbness, or tremors  SKIN: No itching, burning, rashes, or lesions   LYMPH NODES: No enlarged glands  ENDOCRINE: No heat or cold intolerance; No hair loss  MUSCULOSKELETAL: No joint pain or swelling; No muscle, back, or extremity pain  PSYCHIATRIC: No depression, anxiety, mood swings, or difficulty sleeping  HEME/LYMPH: No easy bruising, or bleeding gums  ALLERY AND IMMUNOLOGIC: No hives or eczema      Physicial Exam:     Constitutional: NAD, well-groomed, well-developed  HEENT: PERRLA, EOMI, no drainage or redness  Neck: No bruits; no thyromegaly or nodules,  No JVD  Back: Normal spine flexure, No CVA tenderness, No deformity or limitation of movement  Respiratory: Breath Sounds equal & clear to percussion & auscultation, no accessory muscle use  Cardiovascular: Regular rate & rhythm, normal S1, S2; no murmurs, gallops or rubs; no S3, S4  Gastrointestinal: Soft, non-tender, non distended no hepatosplenomegaly, normal bowel sounds  Extremities: No peripheral edema, No cyanosis, clubbing   Vascular: Equal and normal pulses: 2+ peripheral pulses throughout  Neurological: GCS:    A&O x 3; no sensory, motor  deficits, normal reflexes  Psychiatric: Normal mood, normal affect  Musculoskeletal: No joint pain, swelling or deformity; no limitation of movement  Skin: No rashes

## 2017-11-01 DIAGNOSIS — K74.60 UNSPECIFIED CIRRHOSIS OF LIVER: ICD-10-CM

## 2017-11-01 DIAGNOSIS — F10.239 ALCOHOL DEPENDENCE WITH WITHDRAWAL, UNSPECIFIED: ICD-10-CM

## 2017-11-01 DIAGNOSIS — J96.00 ACUTE RESPIRATORY FAILURE, UNSPECIFIED WHETHER WITH HYPOXIA OR HYPERCAPNIA: ICD-10-CM

## 2017-11-01 LAB
ANION GAP SERPL CALC-SCNC: 11 MMOL/L — SIGNIFICANT CHANGE UP (ref 5–17)
BUN SERPL-MCNC: 29 MG/DL — HIGH (ref 8–20)
CALCIUM SERPL-MCNC: 8.3 MG/DL — LOW (ref 8.6–10.2)
CHLORIDE SERPL-SCNC: 102 MMOL/L — SIGNIFICANT CHANGE UP (ref 98–107)
CO2 SERPL-SCNC: 31 MMOL/L — HIGH (ref 22–29)
CREAT SERPL-MCNC: 0.66 MG/DL — SIGNIFICANT CHANGE UP (ref 0.5–1.3)
GLUCOSE SERPL-MCNC: 102 MG/DL — SIGNIFICANT CHANGE UP (ref 70–115)
GRAM STN FLD: SIGNIFICANT CHANGE UP
HCT VFR BLD CALC: 39.5 % — LOW (ref 42–52)
HGB BLD-MCNC: 12.7 G/DL — LOW (ref 14–18)
MAGNESIUM SERPL-MCNC: 2 MG/DL — SIGNIFICANT CHANGE UP (ref 1.6–2.6)
MCHC RBC-ENTMCNC: 30.1 PG — SIGNIFICANT CHANGE UP (ref 27–31)
MCHC RBC-ENTMCNC: 32.2 G/DL — SIGNIFICANT CHANGE UP (ref 32–36)
MCV RBC AUTO: 93.6 FL — SIGNIFICANT CHANGE UP (ref 80–94)
PHOSPHATE SERPL-MCNC: 4.1 MG/DL — SIGNIFICANT CHANGE UP (ref 2.4–4.7)
PLATELET # BLD AUTO: 72 K/UL — LOW (ref 150–400)
POTASSIUM SERPL-MCNC: 3.8 MMOL/L — SIGNIFICANT CHANGE UP (ref 3.5–5.3)
POTASSIUM SERPL-SCNC: 3.8 MMOL/L — SIGNIFICANT CHANGE UP (ref 3.5–5.3)
RBC # BLD: 4.22 M/UL — LOW (ref 4.6–6.2)
RBC # FLD: 17.3 % — HIGH (ref 11–15.6)
SODIUM SERPL-SCNC: 144 MMOL/L — SIGNIFICANT CHANGE UP (ref 135–145)
SPECIMEN SOURCE: SIGNIFICANT CHANGE UP
WBC # BLD: 5.4 K/UL — SIGNIFICANT CHANGE UP (ref 4.8–10.8)
WBC # FLD AUTO: 5.4 K/UL — SIGNIFICANT CHANGE UP (ref 4.8–10.8)

## 2017-11-01 PROCEDURE — 99291 CRITICAL CARE FIRST HOUR: CPT

## 2017-11-01 RX ORDER — THIAMINE MONONITRATE (VIT B1) 100 MG
500 TABLET ORAL EVERY 12 HOURS
Qty: 0 | Refills: 0 | Status: COMPLETED | OUTPATIENT
Start: 2017-11-01 | End: 2017-11-04

## 2017-11-01 RX ORDER — SODIUM CHLORIDE 9 MG/ML
1000 INJECTION, SOLUTION INTRAVENOUS ONCE
Qty: 0 | Refills: 0 | Status: COMPLETED | OUTPATIENT
Start: 2017-11-01 | End: 2017-11-01

## 2017-11-01 RX ORDER — METOPROLOL TARTRATE 50 MG
100 TABLET ORAL
Qty: 0 | Refills: 0 | Status: DISCONTINUED | OUTPATIENT
Start: 2017-11-01 | End: 2017-11-05

## 2017-11-01 RX ORDER — SODIUM CHLORIDE 9 MG/ML
500 INJECTION, SOLUTION INTRAVENOUS ONCE
Qty: 0 | Refills: 0 | Status: COMPLETED | OUTPATIENT
Start: 2017-11-01 | End: 2017-11-01

## 2017-11-01 RX ORDER — PHENOBARBITAL 60 MG
35 TABLET ORAL ONCE
Qty: 0 | Refills: 0 | Status: DISCONTINUED | OUTPATIENT
Start: 2017-11-01 | End: 2017-11-02

## 2017-11-01 RX ORDER — METOPROLOL TARTRATE 50 MG
5 TABLET ORAL ONCE
Qty: 0 | Refills: 0 | Status: COMPLETED | OUTPATIENT
Start: 2017-11-01 | End: 2017-11-01

## 2017-11-01 RX ORDER — SODIUM CHLORIDE 9 MG/ML
1000 INJECTION, SOLUTION INTRAVENOUS
Qty: 0 | Refills: 0 | Status: DISCONTINUED | OUTPATIENT
Start: 2017-11-01 | End: 2017-11-05

## 2017-11-01 RX ORDER — PANTOPRAZOLE SODIUM 20 MG/1
40 TABLET, DELAYED RELEASE ORAL DAILY
Qty: 0 | Refills: 0 | Status: DISCONTINUED | OUTPATIENT
Start: 2017-11-01 | End: 2017-11-03

## 2017-11-01 RX ORDER — ACETAMINOPHEN 500 MG
650 TABLET ORAL EVERY 6 HOURS
Qty: 0 | Refills: 0 | Status: DISCONTINUED | OUTPATIENT
Start: 2017-11-01 | End: 2017-12-14

## 2017-11-01 RX ORDER — SODIUM CHLORIDE 9 MG/ML
1000 INJECTION, SOLUTION INTRAVENOUS
Qty: 0 | Refills: 0 | Status: DISCONTINUED | OUTPATIENT
Start: 2017-11-01 | End: 2017-11-01

## 2017-11-01 RX ADMIN — Medication 50 MILLIGRAM(S): at 11:24

## 2017-11-01 RX ADMIN — Medication 3 MILLILITER(S): at 03:12

## 2017-11-01 RX ADMIN — Medication 3 MILLILITER(S): at 14:28

## 2017-11-01 RX ADMIN — Medication 50 MILLIGRAM(S): at 23:51

## 2017-11-01 RX ADMIN — Medication 100 MILLIGRAM(S): at 23:47

## 2017-11-01 RX ADMIN — Medication 1 PATCH: at 11:15

## 2017-11-01 RX ADMIN — DEXMEDETOMIDINE HYDROCHLORIDE IN 0.9% SODIUM CHLORIDE 7.82 MICROGRAM(S)/KG/HR: 4 INJECTION INTRAVENOUS at 03:41

## 2017-11-01 RX ADMIN — Medication 2 MILLIGRAM(S): at 04:27

## 2017-11-01 RX ADMIN — Medication 50 MILLIGRAM(S): at 17:26

## 2017-11-01 RX ADMIN — Medication 1 TABLET(S): at 11:15

## 2017-11-01 RX ADMIN — SODIUM CHLORIDE 1000 MILLILITER(S): 9 INJECTION, SOLUTION INTRAVENOUS at 19:05

## 2017-11-01 RX ADMIN — Medication 1 MILLIGRAM(S): at 11:15

## 2017-11-01 RX ADMIN — PANTOPRAZOLE SODIUM 40 MILLIGRAM(S): 20 TABLET, DELAYED RELEASE ORAL at 11:15

## 2017-11-01 RX ADMIN — SODIUM CHLORIDE 2000 MILLILITER(S): 9 INJECTION, SOLUTION INTRAVENOUS at 22:00

## 2017-11-01 RX ADMIN — Medication 105 MILLIGRAM(S): at 17:52

## 2017-11-01 RX ADMIN — Medication 3 MILLILITER(S): at 20:02

## 2017-11-01 RX ADMIN — CHLORHEXIDINE GLUCONATE 15 MILLILITER(S): 213 SOLUTION TOPICAL at 17:27

## 2017-11-01 RX ADMIN — SODIUM CHLORIDE 1000 MILLILITER(S): 9 INJECTION, SOLUTION INTRAVENOUS at 20:00

## 2017-11-01 RX ADMIN — Medication 2 MILLIGRAM(S): at 06:44

## 2017-11-01 RX ADMIN — SODIUM CHLORIDE 100 MILLILITER(S): 9 INJECTION, SOLUTION INTRAVENOUS at 23:59

## 2017-11-01 RX ADMIN — ENOXAPARIN SODIUM 40 MILLIGRAM(S): 100 INJECTION SUBCUTANEOUS at 11:15

## 2017-11-01 RX ADMIN — SODIUM CHLORIDE 50 MILLILITER(S): 9 INJECTION, SOLUTION INTRAVENOUS at 06:56

## 2017-11-01 RX ADMIN — Medication 3 MILLILITER(S): at 08:11

## 2017-11-01 RX ADMIN — Medication 5 MILLIGRAM(S): at 14:53

## 2017-11-01 RX ADMIN — Medication 100 MILLIGRAM(S): at 05:08

## 2017-11-01 RX ADMIN — CHLORHEXIDINE GLUCONATE 15 MILLILITER(S): 213 SOLUTION TOPICAL at 05:03

## 2017-11-01 RX ADMIN — Medication 650 MILLIGRAM(S): at 14:53

## 2017-11-01 RX ADMIN — Medication 25 MILLIGRAM(S): at 05:08

## 2017-11-01 RX ADMIN — Medication 1 PATCH: at 11:16

## 2017-11-01 RX ADMIN — Medication 81 MILLIGRAM(S): at 11:15

## 2017-11-01 RX ADMIN — Medication 20 MILLIGRAM(S): at 05:08

## 2017-11-01 NOTE — DIETITIAN INITIAL EVALUATION ADULT. - DIET TYPE
Continuous feeds of Jevity 1.5 at 50ml/hr via nasogastric tube with total volume of 1200ml (1800kcal, 77g protein)

## 2017-11-01 NOTE — PROGRESS NOTE ADULT - ASSESSMENT
61 y/o M with a h/o COPD, a-fib (no a/c), CHF, alcoholic cirrhosis, EtOH abuse, EtOH withdrawal with EtOH withdrawal, acute hypoxic respiratory failure requiring intubation, hypotension, possible aspiration pneumonia, ?severe sepsis. 61 y/o M with a h/o COPD, a-fib (no a/c), CHF, alcoholic cirrhosis, EtOH abuse, EtOH withdrawal with EtOH withdrawal, acute hypoxic respiratory failure requiring intubation, hypotension, possible aspiration pneumonia.

## 2017-11-01 NOTE — PROGRESS NOTE ADULT - SUBJECTIVE AND OBJECTIVE BOX
Patient is a 62y old  Male who presents with a chief complaint of Shortness of breath (30 Oct 2017 15:03)      BRIEF HOSPITAL COURSE: Pt was agitated with tremors to hands and head with confusion, slightly tachycardic. Pt has been getting librium PO by the primary team but with increasing CIWA scores and increased agitation the need for benzodiazapine is great . The patient is accepted to ICU for alcohol withdrawal delirium with constant one to one observation and hemodynamic monitoring       Events last 24 hours: Intubated overnight due to encephalopathy and not protecting airway    PAST MEDICAL & SURGICAL HISTORY:  Falls  Meningitis  Collapsed lung  Alcohol withdrawal  Emphysema of lung  ETOH abuse  Cirrhosis  Afib  CHF (congestive heart failure)  Poor historian  Alcohol abuse  Chronic atrial fibrillation  S/P BKA (below knee amputation), left: secondary to worsening infection in lower leg. Had both ankle injuries from fall s/p repair - left had complication.  S/P BKA (below knee amputation) unilateral, left      Review of Systems:  unable to obtain    Medications:    furosemide    Tablet 20 milliGRAM(s) Oral daily  lisinopril 5 milliGRAM(s) Oral daily  metoprolol succinate  milliGRAM(s) Oral daily    ALBUTerol    0.083% 2.5 milliGRAM(s) Nebulizer every 4 hours PRN  ALBUTerol/ipratropium for Nebulization 3 milliLiter(s) Nebulizer every 6 hours    acetaminophen   Tablet 650 milliGRAM(s) Oral every 6 hours PRN  acetaminophen   Tablet. 650 milliGRAM(s) Oral every 6 hours PRN  chlordiazePOXIDE 25 milliGRAM(s) Oral every 8 hours  dexmedetomidine Infusion 0.3 MICROgram(s)/kG/Hr IV Continuous <Continuous>  LORazepam   Injectable 2 milliGRAM(s) IV Push every 1 hour PRN  ondansetron Injectable 4 milliGRAM(s) IV Push every 6 hours PRN  traZODone 100 milliGRAM(s) Oral at bedtime      aspirin  chewable 81 milliGRAM(s) Oral daily  enoxaparin Injectable 40 milliGRAM(s) SubCutaneous daily          folic acid 1 milliGRAM(s) Oral daily  multiple electrolytes Injection Type 1 1000 milliLiter(s) IV Continuous <Continuous>  multivitamin 1 Tablet(s) Oral daily  potassium chloride    Tablet ER 20 milliEquivalent(s) Oral daily  thiamine 100 milliGRAM(s) Oral every 8 hours    influenza   Vaccine 0.5 milliLiter(s) IntraMuscular once    chlorhexidine 0.12% Liquid 15 milliLiter(s) Swish and Spit two times a day    nicotine - 21 mG/24Hr(s) Patch 1 patch Transdermal daily      Mode: AC/ CMV (Assist Control/ Continuous Mandatory Ventilation)  RR (machine): 14  TV (machine): 450  FiO2: 40  PEEP: 5  MAP: 9  PIP: 23      ICU Vital Signs Last 24 Hrs  T(C): 37.1 (01 Nov 2017 08:00), Max: 37.1 (01 Nov 2017 08:00)  T(F): 98.7 (01 Nov 2017 08:00), Max: 98.7 (01 Nov 2017 08:00)  HR: 82 (01 Nov 2017 08:00) (69 - 164)  BP: 101/67 (01 Nov 2017 08:00) (86/53 - 169/84)  BP(mean): 79 (01 Nov 2017 08:00) (62 - 120)  ABP: --  ABP(mean): --  RR: 23 (01 Nov 2017 08:00) (12 - 39)  SpO2: 97% (01 Nov 2017 08:00) (91% - 99%)      ABG - ( 31 Oct 2017 21:55 )  pH: 7.38  /  pCO2: 59    /  pO2: 93    / HCO3: 31    / Base Excess: 7.6   /  SaO2: 97                  I&O's Detail    31 Oct 2017 07:01  -  01 Nov 2017 07:00  --------------------------------------------------------  IN:    dexmedetomidine Infusion: 194.4 mL    multiple electrolytes Injection Type 1: 100 mL  Total IN: 294.4 mL    OUT:    Incontinent per Condom Catheter: 410 mL    Nasoenteral Tube: 300 mL    Voided: 625 mL  Total OUT: 1335 mL    Total NET: -1040.6 mL      01 Nov 2017 07:01  -  01 Nov 2017 08:21  --------------------------------------------------------  IN:    dexmedetomidine Infusion: 7 mL    multiple electrolytes Injection Type 1: 50 mL  Total IN: 57 mL    OUT:  Total OUT: 0 mL    Total NET: 57 mL            LABS:                        12.7   5.4   )-----------( 72       ( 01 Nov 2017 05:39 )             39.5     11-01    144  |  102  |  29.0<H>  ----------------------------<  102  3.8   |  31.0<H>  |  0.66    Ca    8.3<L>      01 Nov 2017 05:39  Phos  4.1     11-01  Mg     2.0     11-01    TPro  6.3<L>  /  Alb  3.2<L>  /  TBili  0.7  /  DBili  x   /  AST  21  /  ALT  19  /  AlkPhos  104  10-31          CAPILLARY BLOOD GLUCOSE  97 (31 Oct 2017 20:00)      POCT Blood Glucose.: 97 mg/dL (31 Oct 2017 19:50)        CULTURES:      Physical Examination:    General: No acute distress.  Alert, oriented, interactive, nonfocal    HEENT: Pupils equal, reactive to light.  Symmetric.    PULM: Clear to auscultation bilaterally, no significant sputum production    CVS: Regular rate and rhythm, no murmurs, rubs, or gallops    ABD: Soft, nondistended, nontender, normoactive bowel sounds, no masses    EXT: No edema, nontender    SKIN: Warm and well perfused, no rashes noted.    RADIOLOGY: ***    CRITICAL CARE TIME SPENT: *** Patient is a 62y old  Male who presents with a chief complaint of Shortness of breath (30 Oct 2017 15:03)      BRIEF HOSPITAL COURSE: Pt was agitated with tremors to hands and head with confusion, slightly tachycardic. Pt has been getting librium PO by the primary team but with increasing CIWA scores and increased agitation the need for benzodiazapine is great . The patient is accepted to ICU for alcohol withdrawal delirium with constant one to one observation and hemodynamic monitoring       Events last 24 hours: Intubated overnight due to encephalopathy and not protecting airway    PAST MEDICAL & SURGICAL HISTORY:  Falls  Meningitis  Collapsed lung  Alcohol withdrawal  Emphysema of lung  ETOH abuse  Cirrhosis  Afib  CHF (congestive heart failure)  Poor historian  Alcohol abuse  Chronic atrial fibrillation  S/P BKA (below knee amputation), left: secondary to worsening infection in lower leg. Had both ankle injuries from fall s/p repair - left had complication.  S/P BKA (below knee amputation) unilateral, left      Review of Systems:  unable to obtain    Medications:    furosemide    Tablet 20 milliGRAM(s) Oral daily  lisinopril 5 milliGRAM(s) Oral daily  metoprolol succinate  milliGRAM(s) Oral daily    ALBUTerol    0.083% 2.5 milliGRAM(s) Nebulizer every 4 hours PRN  ALBUTerol/ipratropium for Nebulization 3 milliLiter(s) Nebulizer every 6 hours    acetaminophen   Tablet 650 milliGRAM(s) Oral every 6 hours PRN  acetaminophen   Tablet. 650 milliGRAM(s) Oral every 6 hours PRN  chlordiazePOXIDE 25 milliGRAM(s) Oral every 8 hours  dexmedetomidine Infusion 0.3 MICROgram(s)/kG/Hr IV Continuous <Continuous>  LORazepam   Injectable 2 milliGRAM(s) IV Push every 1 hour PRN  ondansetron Injectable 4 milliGRAM(s) IV Push every 6 hours PRN  traZODone 100 milliGRAM(s) Oral at bedtime      aspirin  chewable 81 milliGRAM(s) Oral daily  enoxaparin Injectable 40 milliGRAM(s) SubCutaneous daily          folic acid 1 milliGRAM(s) Oral daily  multiple electrolytes Injection Type 1 1000 milliLiter(s) IV Continuous <Continuous>  multivitamin 1 Tablet(s) Oral daily  potassium chloride    Tablet ER 20 milliEquivalent(s) Oral daily  thiamine 100 milliGRAM(s) Oral every 8 hours    influenza   Vaccine 0.5 milliLiter(s) IntraMuscular once    chlorhexidine 0.12% Liquid 15 milliLiter(s) Swish and Spit two times a day    nicotine - 21 mG/24Hr(s) Patch 1 patch Transdermal daily      Mode: AC/ CMV (Assist Control/ Continuous Mandatory Ventilation)  RR (machine): 14  TV (machine): 450  FiO2: 40  PEEP: 5  MAP: 9  PIP: 23      ICU Vital Signs Last 24 Hrs  T(C): 37.1 (01 Nov 2017 08:00), Max: 37.1 (01 Nov 2017 08:00)  T(F): 98.7 (01 Nov 2017 08:00), Max: 98.7 (01 Nov 2017 08:00)  HR: 82 (01 Nov 2017 08:00) (69 - 164)  BP: 101/67 (01 Nov 2017 08:00) (86/53 - 169/84)  BP(mean): 79 (01 Nov 2017 08:00) (62 - 120)  ABP: --  ABP(mean): --  RR: 23 (01 Nov 2017 08:00) (12 - 39)  SpO2: 97% (01 Nov 2017 08:00) (91% - 99%)      ABG - ( 31 Oct 2017 21:55 )  pH: 7.38  /  pCO2: 59    /  pO2: 93    / HCO3: 31    / Base Excess: 7.6   /  SaO2: 97                  I&O's Detail    31 Oct 2017 07:01  -  01 Nov 2017 07:00  --------------------------------------------------------  IN:    dexmedetomidine Infusion: 194.4 mL    multiple electrolytes Injection Type 1: 100 mL  Total IN: 294.4 mL    OUT:    Incontinent per Condom Catheter: 410 mL    Nasoenteral Tube: 300 mL    Voided: 625 mL  Total OUT: 1335 mL    Total NET: -1040.6 mL      01 Nov 2017 07:01  -  01 Nov 2017 08:21  --------------------------------------------------------  IN:    dexmedetomidine Infusion: 7 mL    multiple electrolytes Injection Type 1: 50 mL  Total IN: 57 mL    OUT:  Total OUT: 0 mL    Total NET: 57 mL            LABS:                        12.7   5.4   )-----------( 72       ( 01 Nov 2017 05:39 )             39.5     11-01    144  |  102  |  29.0<H>  ----------------------------<  102  3.8   |  31.0<H>  |  0.66    Ca    8.3<L>      01 Nov 2017 05:39  Phos  4.1     11-01  Mg     2.0     11-01    TPro  6.3<L>  /  Alb  3.2<L>  /  TBili  0.7  /  DBili  x   /  AST  21  /  ALT  19  /  AlkPhos  104  10-31          CAPILLARY BLOOD GLUCOSE  97 (31 Oct 2017 20:00)      POCT Blood Glucose.: 97 mg/dL (31 Oct 2017 19:50)        CULTURES:      Physical Examination:    General: No acute distress.  Intubated and sedated     HEENT: Pupils equal, reactive to light.  Symmetric.    PULM: Course, moderate secretions    CVS: Regular rate and rhythm, no murmurs, rubs, or gallops    ABD: Soft, nondistended, nontender, normoactive bowel sounds, no masses    EXT: No edema, nontender    SKIN: Warm and well perfused, no rashes noted.    RADIOLOGY: < from: Xray Chest 1 View AP/PA. (10.31.17 @ 21:37) >   EXAM:  CHEST SINGLE VIEW FRONTAL                          PROCEDURE DATE:  10/31/2017          INTERPRETATION:  HISTORY: Intubated  TECHNIQUE: Portable frontal view of the chest, 1 view.  COMPARISON: 10/27/2017.  FINDINGS:   The endotracheal tube is above the regina. The nasogastric tube courses   below the left hemidiaphragm, tip off edge of film.  HEART:  Enlarged  LUNGS: The right lung is clear. Retrocardiac region not well seen   consider PA and lateral chest when feasible    OSSEOUS STRUCTURES:: degenerative changes    IMPRESSION:     Cardiomegaly.    The right lung is clear. Retrocardiac region not well seen consider PA   and lateral chest when feasible.    CRITICAL CARE TIME SPENT: 45

## 2017-11-01 NOTE — DIETITIAN INITIAL EVALUATION ADULT. - OTHER INFO
Pt seen at bedside. Pt with endotracheal tube, unable to arouse. Receiving Jevity 1.5, 1200ml/day (1800kcal, 77g protein) via nasogastric tube. Pt presents with ETOH withdrawal, ETOH withdrawal delirium, Acute respiratory failure, and atrial fibrillation, COPD, and Afib.

## 2017-11-01 NOTE — PROGRESS NOTE ADULT - PROBLEM SELECTOR PLAN 1
increase libruim, add prn dose of phenobarbital, adivan prn (shortage of valium) increase thiamine, continue folate, MVI

## 2017-11-01 NOTE — PROGRESS NOTE ADULT - PROBLEM SELECTOR PLAN 2
Continue librium and precedex gtt. Monitor mental status. Continue thiamine, folate, and multivitamin.

## 2017-11-01 NOTE — PROGRESS NOTE ADULT - ASSESSMENT
62 yom with ETOH withdrawal, acute respiratory failure, cirrhosis 62 yom with ETOH withdrawal, acute respiratory failure, cirrhosis    Daughter coming today will update as needed

## 2017-11-01 NOTE — PROGRESS NOTE ADULT - PROBLEM SELECTOR PLAN 1
Intubated for airway protection. Continue mechanical ventilation. On full vent support. Will titrate to maintain SaO2 > 92% and wean as tolerated. Continue sedation with low dose precedex. Suction PRN.

## 2017-11-01 NOTE — PROGRESS NOTE ADULT - SUBJECTIVE AND OBJECTIVE BOX
Patient is a 62y old  Male who presents with a chief complaint of Shortness of breath (30 Oct 2017 15:03)      BRIEF HOSPITAL COURSE: ***    Events last 24 hours: ***    PAST MEDICAL & SURGICAL HISTORY:  Falls  Meningitis  Collapsed lung  Alcohol withdrawal  Emphysema of lung  ETOH abuse  Cirrhosis  Afib  CHF (congestive heart failure)  Poor historian  Alcohol abuse  Chronic atrial fibrillation  S/P BKA (below knee amputation), left: secondary to worsening infection in lower leg. Had both ankle injuries from fall s/p repair - left had complication.  S/P BKA (below knee amputation) unilateral, left      Review of Systems:  CONSTITUTIONAL: No fever, chills, or fatigue  EYES: No eye pain, visual disturbances, or discharge  ENMT:  No difficulty hearing, tinnitus, vertigo; No sinus or throat pain  NECK: No pain or stiffness  RESPIRATORY: No cough, wheezing, chills or hemoptysis; No shortness of breath  CARDIOVASCULAR: No chest pain, palpitations, dizziness, or leg swelling  GASTROINTESTINAL: No abdominal or epigastric pain. No nausea, vomiting, or hematemesis; No diarrhea or constipation. No melena or hematochezia.  GENITOURINARY: No dysuria, frequency, hematuria, or incontinence  NEUROLOGICAL: No headaches, memory loss, loss of strength, numbness, or tremors  SKIN: No itching, burning, rashes, or lesions   MUSCULOSKELETAL: No joint pain or swelling; No muscle, back, or extremity pain  PSYCHIATRIC: No depression, anxiety, mood swings, or difficulty sleeping      Medications:    furosemide    Tablet 20 milliGRAM(s) Oral daily  lisinopril 5 milliGRAM(s) Oral daily  metoprolol     tartrate 100 milliGRAM(s) Oral two times a day    ALBUTerol    0.083% 2.5 milliGRAM(s) Nebulizer every 4 hours PRN  ALBUTerol/ipratropium for Nebulization 3 milliLiter(s) Nebulizer every 6 hours    acetaminophen    Suspension 650 milliGRAM(s) Oral every 6 hours PRN  acetaminophen   Tablet 650 milliGRAM(s) Oral every 6 hours PRN  acetaminophen   Tablet. 650 milliGRAM(s) Oral every 6 hours PRN  chlordiazePOXIDE 50 milliGRAM(s) Oral every 6 hours  dexmedetomidine Infusion 0.3 MICROgram(s)/kG/Hr IV Continuous <Continuous>  LORazepam   Injectable 2 milliGRAM(s) IV Push every 1 hour PRN  ondansetron Injectable 4 milliGRAM(s) IV Push every 6 hours PRN  PHENobarbital Injectable 35 milliGRAM(s) IV Push once PRN  traZODone 100 milliGRAM(s) Oral at bedtime      aspirin  chewable 81 milliGRAM(s) Oral daily  enoxaparin Injectable 40 milliGRAM(s) SubCutaneous daily    pantoprazole  Injectable 40 milliGRAM(s) IV Push daily        folic acid 1 milliGRAM(s) Oral daily  multiple electrolytes Injection Type 1 Bolus 500 milliLiter(s) IV Bolus once  multiple electrolytes Injection Type 1 Bolus 1000 milliLiter(s) IV Bolus once  multivitamin 1 Tablet(s) Oral daily  thiamine IVPB 500 milliGRAM(s) IV Intermittent every 12 hours    influenza   Vaccine 0.5 milliLiter(s) IntraMuscular once    chlorhexidine 0.12% Liquid 15 milliLiter(s) Swish and Spit two times a day    nicotine - 21 mG/24Hr(s) Patch 1 patch Transdermal daily      Mode: AC/ CMV (Assist Control/ Continuous Mandatory Ventilation)  RR (machine): 14  TV (machine): 450  FiO2: 30  PEEP: 5  MAP: 10  PIP: 22      ICU Vital Signs Last 24 Hrs  T(C): 37.3 (01 Nov 2017 19:00), Max: 38.9 (01 Nov 2017 16:00)  T(F): 99.1 (01 Nov 2017 19:00), Max: 102.1 (01 Nov 2017 16:00)  HR: 93 (01 Nov 2017 21:00) (69 - 130)  BP: 87/49 (01 Nov 2017 21:00) (80/46 - 118/78)  BP(mean): 62 (01 Nov 2017 21:00) (58 - 91)  ABP: --  ABP(mean): --  RR: 23 (01 Nov 2017 21:00) (14 - 35)  SpO2: 100% (01 Nov 2017 21:00) (91% - 100%)      ABG - ( 31 Oct 2017 21:55 )  pH: 7.38  /  pCO2: 59    /  pO2: 93    / HCO3: 31    / Base Excess: 7.6   /  SaO2: 97                  I&O's Detail    31 Oct 2017 07:01  -  01 Nov 2017 07:00  --------------------------------------------------------  IN:    dexmedetomidine Infusion: 195.2 mL    multiple electrolytes Injection Type 1multiple electrolytes Injection Type 1: 100 mL  Total IN: 295.2 mL    OUT:    Incontinent per Condom Catheter: 410 mL    Nasoenteral Tube: 300 mL    Voided: 625 mL  Total OUT: 1335 mL    Total NET: -1039.8 mL      01 Nov 2017 07:01  -  01 Nov 2017 22:01  --------------------------------------------------------  IN:    dexmedetomidine Infusion: 36.2 mL    Enteral Tube Flush: 150 mL    IV PiggyBack: 500 mL    Jevity: 600 mL    multiple electrolytes Injection Type 1multiple electrolytes Injection Type 1: 150 mL    Solution: 1000 mL  Total IN: 2436.2 mL    OUT:    Incontinent per Condom Catheter: 800 mL  Total OUT: 800 mL    Total NET: 1636.2 mL            LABS:                        12.7   5.4   )-----------( 72       ( 01 Nov 2017 05:39 )             39.5     11-01    144  |  102  |  29.0<H>  ----------------------------<  102  3.8   |  31.0<H>  |  0.66    Ca    8.3<L>      01 Nov 2017 05:39  Phos  4.1     11-01  Mg     2.0     11-01    TPro  6.3<L>  /  Alb  3.2<L>  /  TBili  0.7  /  DBili  x   /  AST  21  /  ALT  19  /  AlkPhos  104  10-31          CAPILLARY BLOOD GLUCOSE  97 (31 Oct 2017 20:00)      POCT Blood Glucose.: 97 mg/dL (31 Oct 2017 19:50)        CULTURES:      Physical Examination:    General: No acute distress.  Alert, oriented, interactive, nonfocal    HEENT: Pupils equal, reactive to light.  Symmetric.    PULM: Clear to auscultation bilaterally, no significant sputum production    CVS: Regular rate and rhythm, no murmurs, rubs, or gallops    ABD: Soft, nondistended, nontender, normoactive bowel sounds, no masses    EXT: No edema, nontender    SKIN: Warm and well perfused, no rashes noted.    NEURO: A&Ox3, strength 5/5 all extremities, cranial nerves grossly intact, no focal deficits    RADIOLOGY: ***    CRITICAL CARE TIME SPENT: ***  Evaluating/treating patient, reviewing data/labs/imaging, discussing case with multidisciplinary team, discussing plan/goals of care with patient/family. Non-inclusive of procedure time. Patient is a 62y old  Male who presents with a chief complaint of Shortness of breath (30 Oct 2017 15:03)      BRIEF HOSPITAL COURSE: 63 y/o M with a h/o COPD, a-fib (no a/c), CHF, alcoholic cirrhosis, EtOH abuse, EtOH withdrawal (intubated in past for airway protection), initially admitted on 10/28 for COPD exacerbation and alcohol intoxication, signed out AMA and was found later on in hospital disoriented. Readmitted and RRT called later on when patient became tremulous, agitated, delirious, and began hallucinating. Transferred to MICU for further management with benzos and precedex gtt. Patient became obtunded, lost ability to protect airway, and was subsequently intubated. As per report, patient is highly sensitive to benzodiazepines.     Events last 24 hours: Patient remains intubated on full vent support. Obtunded on low dose precedex gtt, will intermittently open eyes to verbal stimuli, not following commands. CT head neg for acute events. Received patient hypotensive (MAP: 55-65) and in the process of finishing 2L IVF bolus. Additional 1.5L IVF bolus given with adequate BP response. Patient with brown sputum in ETT and spiking fevers. WBC trending upwards. As per report, had episode of a-fib with RVR today.    PAST MEDICAL & SURGICAL HISTORY:  Falls  Meningitis  Collapsed lung  Alcohol withdrawal  Emphysema of lung  ETOH abuse  Cirrhosis  Afib  CHF (congestive heart failure)  Poor historian  Alcohol abuse  Chronic atrial fibrillation  S/P BKA (below knee amputation), left: secondary to worsening infection in lower leg. Had both ankle injuries from fall s/p repair - left had complication.  S/P BKA (below knee amputation) unilateral, left      Review of Systems: Unable to obtain secondary to mental status, patient intubated        Medications:    furosemide    Tablet 20 milliGRAM(s) Oral daily  lisinopril 5 milliGRAM(s) Oral daily  metoprolol     tartrate 100 milliGRAM(s) Oral two times a day  ALBUTerol    0.083% 2.5 milliGRAM(s) Nebulizer every 4 hours PRN  ALBUTerol/ipratropium for Nebulization 3 milliLiter(s) Nebulizer every 6 hours  acetaminophen    Suspension 650 milliGRAM(s) Oral every 6 hours PRN  acetaminophen   Tablet 650 milliGRAM(s) Oral every 6 hours PRN  acetaminophen   Tablet. 650 milliGRAM(s) Oral every 6 hours PRN  chlordiazePOXIDE 50 milliGRAM(s) Oral every 6 hours  dexmedetomidine Infusion 0.3 MICROgram(s)/kG/Hr IV Continuous <Continuous>  LORazepam   Injectable 2 milliGRAM(s) IV Push every 1 hour PRN  ondansetron Injectable 4 milliGRAM(s) IV Push every 6 hours PRN  PHENobarbital Injectable 35 milliGRAM(s) IV Push once PRN  traZODone 100 milliGRAM(s) Oral at bedtime  aspirin  chewable 81 milliGRAM(s) Oral daily  enoxaparin Injectable 40 milliGRAM(s) SubCutaneous daily  pantoprazole  Injectable 40 milliGRAM(s) IV Push daily  folic acid 1 milliGRAM(s) Oral daily  multiple electrolytes Injection Type 1 Bolus 500 milliLiter(s) IV Bolus once  multiple electrolytes Injection Type 1 Bolus 1000 milliLiter(s) IV Bolus once  multivitamin 1 Tablet(s) Oral daily  thiamine IVPB 500 milliGRAM(s) IV Intermittent every 12 hours  influenza   Vaccine 0.5 milliLiter(s) IntraMuscular once  chlorhexidine 0.12% Liquid 15 milliLiter(s) Swish and Spit two times a day  nicotine - 21 mG/24Hr(s) Patch 1 patch Transdermal daily      Mode: AC/ CMV (Assist Control/ Continuous Mandatory Ventilation)  RR (machine): 14  TV (machine): 450  FiO2: 30  PEEP: 5  MAP: 10  PIP: 22      ICU Vital Signs Last 24 Hrs  T(C): 37.3 (01 Nov 2017 19:00), Max: 38.9 (01 Nov 2017 16:00)  T(F): 99.1 (01 Nov 2017 19:00), Max: 102.1 (01 Nov 2017 16:00)  HR: 93 (01 Nov 2017 21:00) (69 - 130)  BP: 87/49 (01 Nov 2017 21:00) (80/46 - 118/78)  BP(mean): 62 (01 Nov 2017 21:00) (58 - 91)  ABP: --  ABP(mean): --  RR: 23 (01 Nov 2017 21:00) (14 - 35)  SpO2: 100% (01 Nov 2017 21:00) (91% - 100%)      ABG - ( 31 Oct 2017 21:55 )  pH: 7.38  /  pCO2: 59    /  pO2: 93    / HCO3: 31    / Base Excess: 7.6   /  SaO2: 97                  I&O's Detail    31 Oct 2017 07:01  -  01 Nov 2017 07:00  --------------------------------------------------------  IN:    dexmedetomidine Infusion: 195.2 mL    multiple electrolytes Injection Type 1multiple electrolytes Injection Type 1: 100 mL  Total IN: 295.2 mL    OUT:    Incontinent per Condom Catheter: 410 mL    Nasoenteral Tube: 300 mL    Voided: 625 mL  Total OUT: 1335 mL    Total NET: -1039.8 mL      01 Nov 2017 07:01  -  01 Nov 2017 22:01  --------------------------------------------------------  IN:    dexmedetomidine Infusion: 36.2 mL    Enteral Tube Flush: 150 mL    IV PiggyBack: 500 mL    Jevity: 600 mL    multiple electrolytes Injection Type 1multiple electrolytes Injection Type 1: 150 mL    Solution: 1000 mL  Total IN: 2436.2 mL    OUT:    Incontinent per Condom Catheter: 800 mL  Total OUT: 800 mL    Total NET: 1636.2 mL            LABS:                        12.7   5.4   )-----------( 72       ( 01 Nov 2017 05:39 )             39.5     11-01    144  |  102  |  29.0<H>  ----------------------------<  102  3.8   |  31.0<H>  |  0.66    Ca    8.3<L>      01 Nov 2017 05:39  Phos  4.1     11-01  Mg     2.0     11-01    TPro  6.3<L>  /  Alb  3.2<L>  /  TBili  0.7  /  DBili  x   /  AST  21  /  ALT  19  /  AlkPhos  104  10-31          CAPILLARY BLOOD GLUCOSE  97 (31 Oct 2017 20:00)      POCT Blood Glucose.: 97 mg/dL (31 Oct 2017 19:50)        CULTURES:      Physical Examination:    General: No acute distress.  Obtunded, on very low dose sedation, intubated, on vent    HEENT: Pupils equal, reactive to light.  Symmetric.    PULM: coarse breath sounds bilaterally, moderate brown sputum production    CVS: tachycardic, irregularly irregular rhythm, no murmurs, rubs, or gallops    ABD: Soft, nondistended, nontender, normoactive bowel sounds, no masses    EXT: R LE edema, nontender    SKIN: Warm and well perfused, no rashes noted.    NEURO: obtunded, will intermittently open eyes to verbal stimuli, not following commands    RADIOLOGY:     < from: CT Head No Cont (10.31.17 @ 23:08) >  Impression:     Motion degraded study. No acute intracranial hemorrhage or mass effect,   given the extent of artifact. Evaluation of infarct is limited especially   in the lower brain and posterior fossa due to significant patient motion.   If clinically indicated and when patient is able to cooperate after   adequate sedation, a short-term follow-up or an MRI may be obtained for   further evaluation.     < from: Xray Chest 1 View AP/PA. (10.31.17 @ 21:37) >  IMPRESSION:     Cardiomegaly.    The right lung is clear. Retrocardiac region not well seen consider PA   and lateral chest when feasible.        CRITICAL CARE TIME SPENT: 45 mins  Evaluating/treating patient, reviewing data/labs/imaging, discussing case with multidisciplinary team, discussing plan/goals of care with patient/family. Non-inclusive of procedure time.

## 2017-11-01 NOTE — PROGRESS NOTE ADULT - PROBLEM SELECTOR PLAN 4
Monitor off antibiotics for now. Sputum cultures neg for growth so far. Suction PRN. F/u blood cultures.

## 2017-11-01 NOTE — PROGRESS NOTE ADULT - PROBLEM SELECTOR PLAN 3
Cannot exclude relation to lopressor and cardizem patient received for rapid a-fib. BP responded to 3.5L IVF. Will continue with gentle IVF hydration for now. Can this possibly be sepsis related given fevers and sputum? F/u blood cultures. Sputum cultures neg for growth so far. Procalcitonin ordered for morning.

## 2017-11-02 ENCOUNTER — APPOINTMENT (OUTPATIENT)
Dept: ELECTROPHYSIOLOGY | Facility: CLINIC | Age: 62
End: 2017-11-02

## 2017-11-02 DIAGNOSIS — I50.9 HEART FAILURE, UNSPECIFIED: ICD-10-CM

## 2017-11-02 DIAGNOSIS — I48.91 UNSPECIFIED ATRIAL FIBRILLATION: ICD-10-CM

## 2017-11-02 DIAGNOSIS — R50.9 FEVER, UNSPECIFIED: ICD-10-CM

## 2017-11-02 DIAGNOSIS — J96.01 ACUTE RESPIRATORY FAILURE WITH HYPOXIA: ICD-10-CM

## 2017-11-02 DIAGNOSIS — I95.9 HYPOTENSION, UNSPECIFIED: ICD-10-CM

## 2017-11-02 LAB
ANION GAP SERPL CALC-SCNC: 9 MMOL/L — SIGNIFICANT CHANGE UP (ref 5–17)
BUN SERPL-MCNC: 21 MG/DL — HIGH (ref 8–20)
CALCIUM SERPL-MCNC: 8 MG/DL — LOW (ref 8.6–10.2)
CHLORIDE SERPL-SCNC: 101 MMOL/L — SIGNIFICANT CHANGE UP (ref 98–107)
CO2 SERPL-SCNC: 32 MMOL/L — HIGH (ref 22–29)
CREAT SERPL-MCNC: 0.66 MG/DL — SIGNIFICANT CHANGE UP (ref 0.5–1.3)
GLUCOSE SERPL-MCNC: 135 MG/DL — HIGH (ref 70–115)
HCT VFR BLD CALC: 37.2 % — LOW (ref 42–52)
HGB BLD-MCNC: 11.7 G/DL — LOW (ref 14–18)
MAGNESIUM SERPL-MCNC: 2.1 MG/DL — SIGNIFICANT CHANGE UP (ref 1.6–2.6)
MCHC RBC-ENTMCNC: 29.7 PG — SIGNIFICANT CHANGE UP (ref 27–31)
MCHC RBC-ENTMCNC: 31.5 G/DL — LOW (ref 32–36)
MCV RBC AUTO: 94.4 FL — HIGH (ref 80–94)
PHOSPHATE SERPL-MCNC: 2.9 MG/DL — SIGNIFICANT CHANGE UP (ref 2.4–4.7)
PLATELET # BLD AUTO: 80 K/UL — LOW (ref 150–400)
POTASSIUM SERPL-MCNC: 3.3 MMOL/L — LOW (ref 3.5–5.3)
POTASSIUM SERPL-SCNC: 3.3 MMOL/L — LOW (ref 3.5–5.3)
PROCALCITONIN SERPL-MCNC: <0.05 NG/ML — SIGNIFICANT CHANGE UP (ref 0–0.04)
RBC # BLD: 3.94 M/UL — LOW (ref 4.6–6.2)
RBC # FLD: 17.8 % — HIGH (ref 11–15.6)
SODIUM SERPL-SCNC: 142 MMOL/L — SIGNIFICANT CHANGE UP (ref 135–145)
WBC # BLD: 6.1 K/UL — SIGNIFICANT CHANGE UP (ref 4.8–10.8)
WBC # FLD AUTO: 6.1 K/UL — SIGNIFICANT CHANGE UP (ref 4.8–10.8)

## 2017-11-02 PROCEDURE — 99233 SBSQ HOSP IP/OBS HIGH 50: CPT

## 2017-11-02 PROCEDURE — 99291 CRITICAL CARE FIRST HOUR: CPT

## 2017-11-02 RX ORDER — DIGOXIN 250 MCG
0.5 TABLET ORAL ONCE
Qty: 0 | Refills: 0 | Status: COMPLETED | OUTPATIENT
Start: 2017-11-02 | End: 2017-11-04

## 2017-11-02 RX ORDER — POTASSIUM CHLORIDE 20 MEQ
10 PACKET (EA) ORAL
Qty: 0 | Refills: 0 | Status: COMPLETED | OUTPATIENT
Start: 2017-11-02 | End: 2017-11-02

## 2017-11-02 RX ADMIN — PANTOPRAZOLE SODIUM 40 MILLIGRAM(S): 20 TABLET, DELAYED RELEASE ORAL at 11:36

## 2017-11-02 RX ADMIN — Medication 50 MILLIGRAM(S): at 23:00

## 2017-11-02 RX ADMIN — Medication 50 MILLIGRAM(S): at 17:40

## 2017-11-02 RX ADMIN — Medication 1 TABLET(S): at 13:01

## 2017-11-02 RX ADMIN — Medication 2 MILLIGRAM(S): at 19:06

## 2017-11-02 RX ADMIN — Medication 3 MILLILITER(S): at 08:01

## 2017-11-02 RX ADMIN — DEXMEDETOMIDINE HYDROCHLORIDE IN 0.9% SODIUM CHLORIDE 7.82 MICROGRAM(S)/KG/HR: 4 INJECTION INTRAVENOUS at 01:37

## 2017-11-02 RX ADMIN — Medication 1 PATCH: at 11:35

## 2017-11-02 RX ADMIN — Medication 20 MILLIGRAM(S): at 05:30

## 2017-11-02 RX ADMIN — Medication 50 MILLIGRAM(S): at 11:35

## 2017-11-02 RX ADMIN — Medication 1 PATCH: at 12:01

## 2017-11-02 RX ADMIN — Medication 1 MILLIGRAM(S): at 11:35

## 2017-11-02 RX ADMIN — SODIUM CHLORIDE 50 MILLILITER(S): 9 INJECTION, SOLUTION INTRAVENOUS at 21:51

## 2017-11-02 RX ADMIN — ENOXAPARIN SODIUM 40 MILLIGRAM(S): 100 INJECTION SUBCUTANEOUS at 11:36

## 2017-11-02 RX ADMIN — Medication 3 MILLILITER(S): at 20:26

## 2017-11-02 RX ADMIN — Medication 3 MILLILITER(S): at 03:37

## 2017-11-02 RX ADMIN — Medication 100 MILLIGRAM(S): at 21:50

## 2017-11-02 RX ADMIN — Medication 100 MILLIEQUIVALENT(S): at 14:04

## 2017-11-02 RX ADMIN — Medication 105 MILLIGRAM(S): at 05:26

## 2017-11-02 RX ADMIN — Medication 105 MILLIGRAM(S): at 17:41

## 2017-11-02 RX ADMIN — Medication 100 MILLIEQUIVALENT(S): at 13:01

## 2017-11-02 RX ADMIN — CHLORHEXIDINE GLUCONATE 15 MILLILITER(S): 213 SOLUTION TOPICAL at 05:29

## 2017-11-02 RX ADMIN — Medication 50 MILLIGRAM(S): at 05:29

## 2017-11-02 RX ADMIN — Medication 81 MILLIGRAM(S): at 11:36

## 2017-11-02 RX ADMIN — Medication 100 MILLIEQUIVALENT(S): at 11:38

## 2017-11-02 RX ADMIN — Medication 100 MILLIGRAM(S): at 17:41

## 2017-11-02 RX ADMIN — Medication 3 MILLILITER(S): at 14:39

## 2017-11-02 RX ADMIN — Medication 35 MILLIGRAM(S): at 14:14

## 2017-11-02 RX ADMIN — Medication 2 MILLIGRAM(S): at 15:14

## 2017-11-02 NOTE — PROGRESS NOTE ADULT - ASSESSMENT
63 Y/O male with history of Alcohol abuse, Emphysema of lung, Cirrhosis, Afib, CHF with preserved LV function. Now with EtoH withdrawal and respiratory depression, requiring intubation. Remains altered. AF rapid in the setting of EtOH withdrawal.   Elevated PA pressures and RV dysfunction noted on TTE. Will need to rule out PE once able to get a CT. Otherwise pulmonary HTN likely related to HFpEF and pulmonary disease.   -continue metoprolol  -can continue ASA alone for AF (risks of anticoagulation outweight benefits at this time). However, if PE noted on CT will need to start IV heparin.

## 2017-11-02 NOTE — PROGRESS NOTE ADULT - PROBLEM SELECTOR PLAN 1
libruim, ativan, phenobarbital as needed, precedex as needed, close monitoring of airway with treatment

## 2017-11-02 NOTE — PROGRESS NOTE ADULT - SUBJECTIVE AND OBJECTIVE BOX
Patient is a 62y old  Male who presents with a chief complaint of Shortness of breath (30 Oct 2017 15:03)      BRIEF HOSPITAL COURSE: Pt was agitated with tremors to hands and head with confusion, slightly tachycardic. Pt has been getting librium PO by the primary team but with increasing CIWA scores and increased agitation the need for benzodiazapine is great . The patient is accepted to ICU for alcohol withdrawal delirium with constant one to one observation and hemodynamic monitoring , required intubation 10/31 due to not protecting airway, also febrile yesterday pan cultured    Events last 24 hours: Self extubated this afternoon, in afib with rvr likely due to withdrawal will treat with understanding patient may worsen and need re-intubation    PAST MEDICAL & SURGICAL HISTORY:  Falls  Meningitis  Collapsed lung  Alcohol withdrawal  Emphysema of lung  ETOH abuse  Cirrhosis  Afib  CHF (congestive heart failure)  Poor historian  Alcohol abuse  Chronic atrial fibrillation  S/P BKA (below knee amputation), left: secondary to worsening infection in lower leg. Had both ankle injuries from fall s/p repair - left had complication.  S/P BKA (below knee amputation) unilateral, left      Review of Systems:  unable to obtain    Medications:    digoxin  Injectable 0.5 milliGRAM(s) IV Push once  furosemide    Tablet 20 milliGRAM(s) Oral daily  lisinopril 5 milliGRAM(s) Oral daily  metoprolol     tartrate 100 milliGRAM(s) Oral two times a day    ALBUTerol    0.083% 2.5 milliGRAM(s) Nebulizer every 4 hours PRN  ALBUTerol/ipratropium for Nebulization 3 milliLiter(s) Nebulizer every 6 hours    acetaminophen    Suspension 650 milliGRAM(s) Oral every 6 hours PRN  acetaminophen   Tablet 650 milliGRAM(s) Oral every 6 hours PRN  acetaminophen   Tablet. 650 milliGRAM(s) Oral every 6 hours PRN  chlordiazePOXIDE 50 milliGRAM(s) Oral every 6 hours  dexmedetomidine Infusion 0.3 MICROgram(s)/kG/Hr IV Continuous <Continuous>  LORazepam   Injectable 2 milliGRAM(s) IV Push every 1 hour PRN  ondansetron Injectable 4 milliGRAM(s) IV Push every 6 hours PRN  traZODone 100 milliGRAM(s) Oral at bedtime      aspirin  chewable 81 milliGRAM(s) Oral daily  enoxaparin Injectable 40 milliGRAM(s) SubCutaneous daily    pantoprazole  Injectable 40 milliGRAM(s) IV Push daily        folic acid 1 milliGRAM(s) Oral daily  multiple electrolytes Injection Type 1 1000 milliLiter(s) IV Continuous <Continuous>  multivitamin 1 Tablet(s) Oral daily  thiamine IVPB 500 milliGRAM(s) IV Intermittent every 12 hours    influenza   Vaccine 0.5 milliLiter(s) IntraMuscular once    chlorhexidine 0.12% Liquid 15 milliLiter(s) Swish and Spit two times a day    nicotine - 21 mG/24Hr(s) Patch 1 patch Transdermal daily      Mode: CPAP with PS  FiO2: 30  PEEP: 5  PS: 5  MAP: 7.7      ICU Vital Signs Last 24 Hrs  T(C): 37.2 (02 Nov 2017 18:00), Max: 37.7 (01 Nov 2017 23:00)  T(F): 98.9 (02 Nov 2017 18:00), Max: 99.8 (01 Nov 2017 23:00)  HR: 104 (02 Nov 2017 19:00) (80 - 160)  BP: 112/55 (02 Nov 2017 19:00) (83/51 - 133/70)  BP(mean): 79 (02 Nov 2017 19:00) (63 - 94)  ABP: --  ABP(mean): --  RR: 36 (02 Nov 2017 19:00) (17 - 42)  SpO2: 99% (02 Nov 2017 20:29) (92% - 100%)      ABG - ( 31 Oct 2017 21:55 )  pH: 7.38  /  pCO2: 59    /  pO2: 93    / HCO3: 31    / Base Excess: 7.6   /  SaO2: 97                  I&O's Detail    01 Nov 2017 07:01  -  02 Nov 2017 07:00  --------------------------------------------------------  IN:    dexmedetomidine Infusion: 38.7 mL    Enteral Tube Flush: 275 mL    Jevity: 1050 mL    multiple electrolytes Injection Type 1: 500 mL    multiple electrolytes Injection Type 1multiple electrolytes Injection Type 1: 150 mL    Solution: 500 mL    Solution: 2500 mL  Total IN: 5013.7 mL    OUT:    Incontinent per Condom Catheter: 1285 mL  Total OUT: 1285 mL    Total NET: 3728.7 mL      02 Nov 2017 07:01  -  02 Nov 2017 21:08  --------------------------------------------------------  IN:    dexmedetomidine Infusion: 20.8 mL    Enteral Tube Flush: 250 mL    Jevity: 100 mL    multiple electrolytes Injection Type 1: 650 mL    Solution: 300 mL  Total IN: 1320.8 mL    OUT:    Incontinent per Condom Catheter: 550 mL  Total OUT: 550 mL    Total NET: 770.8 mL            LABS:                        11.7   6.1   )-----------( 80       ( 02 Nov 2017 07:02 )             37.2     11-02    142  |  101  |  21.0<H>  ----------------------------<  135<H>  3.3<L>   |  32.0<H>  |  0.66    Ca    8.0<L>      02 Nov 2017 07:03  Phos  2.9     11-02  Mg     2.1     11-02            CAPILLARY BLOOD GLUCOSE            CULTURES:  Culture Results:   Moderate Gram Negative Rods  Few Routine respiratory raymundo present  Culture in progress (11-01-17 @ 15:10)      Physical Examination:    General: No acute distress. confused feels like he needs a drink    HEENT: Pupils equal, reactive to light.  Symmetric.    PULM: Clear to auscultation bilaterally, no significant sputum production    CVS: afib with RVR    ABD: Soft, nondistended, nontender, normoactive bowel sounds, no masses    EXT: No edema, nontender    SKIN: Warm and well perfused, no rashes noted.      CRITICAL CARE TIME SPENT: 45

## 2017-11-02 NOTE — PROGRESS NOTE ADULT - ASSESSMENT
62 yom with multiple medical problems biggest ETOH abuse now with ETOH withdrawal and delirium, afib, fever

## 2017-11-02 NOTE — PHYSICAL THERAPY INITIAL EVALUATION ADULT - LEVEL OF INDEPENDENCE: SUPINE/SIT, REHAB EVAL
Date: 9/23/2018    Patient Name: Len Dukes          To Whom it may concern: This letter has been written at the patient's request. The above patient was seen at the [facility] and the [facility] for treatment of a medical condition.     This patient maximum assist (25% patients effort)

## 2017-11-02 NOTE — PHYSICAL THERAPY INITIAL EVALUATION ADULT - RANGE OF MOTION EXAMINATION, REHAB EVAL
bilateral upper extremity ROM was WFL (within functional limits)/bilateral lower extremity ROM was WFL (within functional limits)/deficits as listed below/pt. is left BKA

## 2017-11-02 NOTE — PHYSICAL THERAPY INITIAL EVALUATION ADULT - ADDITIONAL COMMENTS
As per care coordination document, pt. lives alone with 1 step to enter with railing, and was independent PTA, with left prosthesis and cane.

## 2017-11-02 NOTE — PROGRESS NOTE ADULT - SUBJECTIVE AND OBJECTIVE BOX
pt self extubated after being intubated for respiratory distress.   Remains altered.   tele: AF with rapid rates     MEDICATIONS  (STANDING):  ALBUTerol/ipratropium for Nebulization 3 milliLiter(s) Nebulizer every 6 hours  aspirin  chewable 81 milliGRAM(s) Oral daily  chlordiazePOXIDE 50 milliGRAM(s) Oral every 6 hours  chlorhexidine 0.12% Liquid 15 milliLiter(s) Swish and Spit two times a day  dexmedetomidine Infusion 0.3 MICROgram(s)/kG/Hr (7.822 mL/Hr) IV Continuous <Continuous>  digoxin  Injectable 0.5 milliGRAM(s) IV Push once  enoxaparin Injectable 40 milliGRAM(s) SubCutaneous daily  folic acid 1 milliGRAM(s) Oral daily  furosemide    Tablet 20 milliGRAM(s) Oral daily  influenza   Vaccine 0.5 milliLiter(s) IntraMuscular once  lisinopril 5 milliGRAM(s) Oral daily  metoprolol     tartrate 100 milliGRAM(s) Oral two times a day  multiple electrolytes Injection Type 1 1000 milliLiter(s) (50 mL/Hr) IV Continuous <Continuous>  multivitamin 1 Tablet(s) Oral daily  nicotine - 21 mG/24Hr(s) Patch 1 patch Transdermal daily  pantoprazole  Injectable 40 milliGRAM(s) IV Push daily  thiamine IVPB 500 milliGRAM(s) IV Intermittent every 12 hours  traZODone 100 milliGRAM(s) Oral at bedtime    MEDICATIONS  (PRN):  acetaminophen    Suspension 650 milliGRAM(s) Oral every 6 hours PRN For Temp greater than 38 C (100.4 F)  acetaminophen   Tablet 650 milliGRAM(s) Oral every 6 hours PRN For Temp greater than 38 C (100.4 F)  acetaminophen   Tablet. 650 milliGRAM(s) Oral every 6 hours PRN Headache or Bodyache  ALBUTerol    0.083% 2.5 milliGRAM(s) Nebulizer every 4 hours PRN Shortness of Breath and/or Wheezing  LORazepam   Injectable 2 milliGRAM(s) IV Push every 1 hour PRN CIWA> 12  ondansetron Injectable 4 milliGRAM(s) IV Push every 6 hours PRN Nausea and/or Vomiting      Allergies    Ceclor (Rash)    Intolerances      PAST MEDICAL & SURGICAL HISTORY:  Falls  Meningitis  Collapsed lung  Alcohol withdrawal  Emphysema of lung  ETOH abuse  Cirrhosis  Afib  CHF (congestive heart failure)  Poor historian  Alcohol abuse  Chronic atrial fibrillation  S/P BKA (below knee amputation), left: secondary to worsening infection in lower leg. Had both ankle injuries from fall s/p repair - left had complication.  S/P BKA (below knee amputation) unilateral, left      Vital Signs Last 24 Hrs  T(C): 36.6 (02 Nov 2017 12:14), Max: 37.7 (01 Nov 2017 23:00)  T(F): 97.9 (02 Nov 2017 12:14), Max: 99.8 (01 Nov 2017 23:00)  HR: 137 (02 Nov 2017 17:17) (80 - 160)  BP: 133/70 (02 Nov 2017 17:17) (83/51 - 133/70)  BP(mean): 94 (02 Nov 2017 17:17) (59 - 94)  RR: 32 (02 Nov 2017 17:17) (17 - 38)  SpO2: 98% (02 Nov 2017 17:17) (98% - 100%)    Physical Exam:  Constitutional: NAD, confused  Cardiovascular: +S1S2 rapid irregularly irregular  Pulmonary: CTA b/l, unlabored  GI: soft NTND +BS  Extremities: no pedal edema, +distal pulses b/l  Neuro: non focal, MARIE x4    LABS:                        11.7   6.1   )-----------( 80       ( 02 Nov 2017 07:02 )             37.2     11-02    142  |  101  |  21.0<H>  ----------------------------<  135<H>  3.3<L>   |  32.0<H>  |  0.66    Ca    8.0<L>      02 Nov 2017 07:03  Phos  2.9     11-02  Mg     2.1     11-02      RADIOLOGY & ADDITIONAL TESTS:  < from: TTE Echo Complete w/Doppler (10.31.17 @ 09:26) >   1. Limited bedside study due to patient's combative state.   2. Left ventricular ejection fraction, by visual estimation, is 55 to   60%.   3. Normal global left ventricular systolic function.   4. The mitral in-flow pattern reveals no discernable A-wave, therefore   no comment on diastolic function can be made.   5. Moderately enlarged right ventricle.   6. Severely dilated right atrium.   7. Moderate to severe mitral valve regurgitation.   8. Moderate tricuspid regurgitation.   9. Dilated pulmonary artery.  10. Severely enlarged left atrium.  11. There is no evidence of pericardial effusion.  12. Estimated pulmonary artery systolic pressure is 67.6 mmHg assuming a   right atrial pressure of 15 mmHg, which is consistent with severe   pulmonary hypertension.    < end of copied text >

## 2017-11-02 NOTE — PHYSICAL THERAPY INITIAL EVALUATION ADULT - FOLLOWS COMMANDS/ANSWERS QUESTIONS, REHAB EVAL
difficulty understanding pt. secondary to low volume when pt. speaks/75% of the time/able to follow single-step instructions

## 2017-11-03 DIAGNOSIS — I42.6 ALCOHOLIC CARDIOMYOPATHY: ICD-10-CM

## 2017-11-03 LAB
-  AMIKACIN: SIGNIFICANT CHANGE UP
-  AZTREONAM: SIGNIFICANT CHANGE UP
-  CEFEPIME: SIGNIFICANT CHANGE UP
-  CEFTAZIDIME: SIGNIFICANT CHANGE UP
-  CIPROFLOXACIN: SIGNIFICANT CHANGE UP
-  GENTAMICIN: SIGNIFICANT CHANGE UP
-  IMIPENEM: SIGNIFICANT CHANGE UP
-  LEVOFLOXACIN: SIGNIFICANT CHANGE UP
-  MEROPENEM: SIGNIFICANT CHANGE UP
-  PIPERACILLIN/TAZOBACTAM: SIGNIFICANT CHANGE UP
-  TOBRAMYCIN: SIGNIFICANT CHANGE UP
ANION GAP SERPL CALC-SCNC: 10 MMOL/L — SIGNIFICANT CHANGE UP (ref 5–17)
BUN SERPL-MCNC: 13 MG/DL — SIGNIFICANT CHANGE UP (ref 8–20)
CALCIUM SERPL-MCNC: 8.4 MG/DL — LOW (ref 8.6–10.2)
CHLORIDE SERPL-SCNC: 101 MMOL/L — SIGNIFICANT CHANGE UP (ref 98–107)
CO2 SERPL-SCNC: 30 MMOL/L — HIGH (ref 22–29)
CREAT SERPL-MCNC: 0.62 MG/DL — SIGNIFICANT CHANGE UP (ref 0.5–1.3)
CULTURE RESULTS: SIGNIFICANT CHANGE UP
GAS PNL BLDA: SIGNIFICANT CHANGE UP
GLUCOSE SERPL-MCNC: 93 MG/DL — SIGNIFICANT CHANGE UP (ref 70–115)
HCT VFR BLD CALC: 38.5 % — LOW (ref 42–52)
HGB BLD-MCNC: 12.3 G/DL — LOW (ref 14–18)
MAGNESIUM SERPL-MCNC: 2 MG/DL — SIGNIFICANT CHANGE UP (ref 1.6–2.6)
MCHC RBC-ENTMCNC: 30.4 PG — SIGNIFICANT CHANGE UP (ref 27–31)
MCHC RBC-ENTMCNC: 31.9 G/DL — LOW (ref 32–36)
MCV RBC AUTO: 95.1 FL — HIGH (ref 80–94)
METHOD TYPE: SIGNIFICANT CHANGE UP
ORGANISM # SPEC MICROSCOPIC CNT: SIGNIFICANT CHANGE UP
ORGANISM # SPEC MICROSCOPIC CNT: SIGNIFICANT CHANGE UP
PHOSPHATE SERPL-MCNC: 3.4 MG/DL — SIGNIFICANT CHANGE UP (ref 2.4–4.7)
PLATELET # BLD AUTO: 93 K/UL — LOW (ref 150–400)
POTASSIUM SERPL-MCNC: 4 MMOL/L — SIGNIFICANT CHANGE UP (ref 3.5–5.3)
POTASSIUM SERPL-SCNC: 4 MMOL/L — SIGNIFICANT CHANGE UP (ref 3.5–5.3)
RBC # BLD: 4.05 M/UL — LOW (ref 4.6–6.2)
RBC # FLD: 17.3 % — HIGH (ref 11–15.6)
SODIUM SERPL-SCNC: 141 MMOL/L — SIGNIFICANT CHANGE UP (ref 135–145)
SPECIMEN SOURCE: SIGNIFICANT CHANGE UP
WBC # BLD: 7.6 K/UL — SIGNIFICANT CHANGE UP (ref 4.8–10.8)
WBC # FLD AUTO: 7.6 K/UL — SIGNIFICANT CHANGE UP (ref 4.8–10.8)

## 2017-11-03 PROCEDURE — 71275 CT ANGIOGRAPHY CHEST: CPT | Mod: 26

## 2017-11-03 PROCEDURE — 99291 CRITICAL CARE FIRST HOUR: CPT

## 2017-11-03 RX ORDER — PIPERACILLIN AND TAZOBACTAM 4; .5 G/20ML; G/20ML
3.38 INJECTION, POWDER, LYOPHILIZED, FOR SOLUTION INTRAVENOUS ONCE
Qty: 0 | Refills: 0 | Status: COMPLETED | OUTPATIENT
Start: 2017-11-03 | End: 2017-11-03

## 2017-11-03 RX ORDER — PIPERACILLIN AND TAZOBACTAM 4; .5 G/20ML; G/20ML
3.38 INJECTION, POWDER, LYOPHILIZED, FOR SOLUTION INTRAVENOUS EVERY 8 HOURS
Qty: 0 | Refills: 0 | Status: DISCONTINUED | OUTPATIENT
Start: 2017-11-03 | End: 2017-11-06

## 2017-11-03 RX ADMIN — Medication 105 MILLIGRAM(S): at 18:21

## 2017-11-03 RX ADMIN — Medication 20 MILLIGRAM(S): at 05:20

## 2017-11-03 RX ADMIN — Medication 3 MILLILITER(S): at 02:09

## 2017-11-03 RX ADMIN — Medication 105 MILLIGRAM(S): at 05:21

## 2017-11-03 RX ADMIN — CHLORHEXIDINE GLUCONATE 15 MILLILITER(S): 213 SOLUTION TOPICAL at 05:20

## 2017-11-03 RX ADMIN — Medication 3 MILLILITER(S): at 09:23

## 2017-11-03 RX ADMIN — PIPERACILLIN AND TAZOBACTAM 200 GRAM(S): 4; .5 INJECTION, POWDER, LYOPHILIZED, FOR SOLUTION INTRAVENOUS at 23:35

## 2017-11-03 RX ADMIN — Medication 1 PATCH: at 14:53

## 2017-11-03 RX ADMIN — Medication 100 MILLIGRAM(S): at 05:20

## 2017-11-03 RX ADMIN — LISINOPRIL 5 MILLIGRAM(S): 2.5 TABLET ORAL at 05:20

## 2017-11-03 RX ADMIN — SODIUM CHLORIDE 50 MILLILITER(S): 9 INJECTION, SOLUTION INTRAVENOUS at 23:35

## 2017-11-03 RX ADMIN — Medication 3 MILLILITER(S): at 20:29

## 2017-11-03 RX ADMIN — Medication 50 MILLIGRAM(S): at 05:23

## 2017-11-03 RX ADMIN — Medication 3 MILLILITER(S): at 15:00

## 2017-11-03 NOTE — PROGRESS NOTE ADULT - SUBJECTIVE AND OBJECTIVE BOX
Patient is a 62y old  Male who presents with a chief complaint of Shortness of breath (30 Oct 2017 15:03)      BRIEF HOSPITAL COURSE: Pt was agitated with tremors to hands and head with confusion, slightly tachycardic. Pt has been getting librium PO by the primary team but with increasing CIWA scores and increased agitation the need for benzodiazapine is great . The patient is accepted to ICU for alcohol withdrawal delirium with constant one to one observation and hemodynamic monitoring , required intubation 10/31 due to not protecting airway, also febrile was cultured. Pt self extubated 11/2.    Events last 24 hours: remains off vent, periods of somnolence      PAST MEDICAL & SURGICAL HISTORY:  Falls  Meningitis  Collapsed lung  Alcohol withdrawal  Emphysema of lung  ETOH abuse  Cirrhosis  Afib  CHF (congestive heart failure)  Poor historian  Alcohol abuse  Chronic atrial fibrillation  S/P BKA (below knee amputation), left: secondary to worsening infection in lower leg. Had both ankle injuries from fall s/p repair - left had complication.  S/P BKA (below knee amputation) unilateral, left      Review of Systems:  Cannot be obtained as pt too lethargic    Medications:    digoxin  Injectable 0.5 milliGRAM(s) IV Push once  furosemide    Tablet 20 milliGRAM(s) Oral daily  lisinopril 5 milliGRAM(s) Oral daily  metoprolol     tartrate 100 milliGRAM(s) Oral two times a day    ALBUTerol    0.083% 2.5 milliGRAM(s) Nebulizer every 4 hours PRN  ALBUTerol/ipratropium for Nebulization 3 milliLiter(s) Nebulizer every 6 hours    acetaminophen    Suspension 650 milliGRAM(s) Oral every 6 hours PRN  acetaminophen   Tablet 650 milliGRAM(s) Oral every 6 hours PRN  acetaminophen   Tablet. 650 milliGRAM(s) Oral every 6 hours PRN  chlordiazePOXIDE 50 milliGRAM(s) Oral every 6 hours  LORazepam   Injectable 2 milliGRAM(s) IV Push every 1 hour PRN  ondansetron Injectable 4 milliGRAM(s) IV Push every 6 hours PRN      aspirin  chewable 81 milliGRAM(s) Oral daily  enoxaparin Injectable 40 milliGRAM(s) SubCutaneous daily          folic acid 1 milliGRAM(s) Oral daily  multiple electrolytes Injection Type 1 1000 milliLiter(s) IV Continuous <Continuous>  multivitamin 1 Tablet(s) Oral daily  thiamine IVPB 500 milliGRAM(s) IV Intermittent every 12 hours    influenza   Vaccine 0.5 milliLiter(s) IntraMuscular once      nicotine - 21 mG/24Hr(s) Patch 1 patch Transdermal daily          ICU Vital Signs Last 24 Hrs  T(C): 37.1 (03 Nov 2017 18:00), Max: 37.8 (03 Nov 2017 12:01)  T(F): 98.7 (03 Nov 2017 18:00), Max: 100.1 (03 Nov 2017 12:01)  HR: 95 (03 Nov 2017 22:00) (80 - 119)  BP: 128/68 (03 Nov 2017 22:00) (108/57 - 166/77)  BP(mean): 93 (03 Nov 2017 22:00) (76 - 110)  ABP: --  ABP(mean): --  RR: 33 (03 Nov 2017 22:00) (29 - 45)  SpO2: 96% (03 Nov 2017 22:00) (80% - 100%)          I&O's Detail    02 Nov 2017 07:01  -  03 Nov 2017 07:00  --------------------------------------------------------  IN:    dexmedetomidine Infusion: 20.8 mL    Enteral Tube Flush: 250 mL    Jevity: 100 mL    multiple electrolytes Injection Type 1: 1200 mL    Solution: 300 mL  Total IN: 1870.8 mL    OUT:    Incontinent per Condom Catheter: 1175 mL  Total OUT: 1175 mL    Total NET: 695.8 mL      03 Nov 2017 07:01  -  03 Nov 2017 22:47  --------------------------------------------------------  IN:    multiple electrolytes Injection Type 1: 550 mL    Solution: 100 mL  Total IN: 650 mL    OUT:    Incontinent per Condom Catheter: 180 mL  Total OUT: 180 mL    Total NET: 470 mL            LABS:                        12.3   7.6   )-----------( 93       ( 03 Nov 2017 06:00 )             38.5     11-03    141  |  101  |  13.0  ----------------------------<  93  4.0   |  30.0<H>  |  0.62    Ca    8.4<L>      03 Nov 2017 06:00  Phos  3.4     11-03  Mg     2.0     11-03            CAPILLARY BLOOD GLUCOSE            CULTURES:  Culture Results:   Moderate Pseudomonas aeruginosa  Few Routine respiratory raymundo present (11-01 @ 15:10)  Culture Results:   No growth at 48 hours (11-01 @ 15:09)  Culture Results:   No growth at 48 hours (11-01 @ 15:08)      Physical Examination:    General: No acute distress.      HEENT: Pupils equal, reactive to light.  Symmetric.    PULM: Clear to auscultation bilaterally, no significant sputum production    CVS: Regular rate and rhythm, no murmurs, rubs, or gallops    ABD: Soft, nondistended, nontender, normoactive bowel sounds, no masses    EXT: No edema, nontender, L BKA    SKIN: Warm and well perfused, no rashes noted.    NEURO: lethargic, arouses, tracks to voice    RADIOLOGY: < from: CT Angio Chest w/ IV Cont (11.03.17 @ 17:10) >  FINDINGS:  No central pulmonary emboli seen. The lower lobar pulmonary arteries are   not adequately opacified to exclude peripheral lower lobe emboli. Upper   lobe pulmonary arteries are grossly patent. The  There is grosscardiomegaly with biventricular and left atrial   enlargement. No pericardial effusion. The  There is a mediastinal and left hilar adenopathy.  There are mild bilateral pleural effusions with basilar  left greater   than right peripheral basilar airspace consolidation..    Marginal osteophytes are noted at multiple disc spaces in the spine.   IMPRESSION:    No evidence of central pulmonary emboli. Peripheral lower lobe branches   cannot be adequately evaluated due to poor opacification..  Gross cardiomegaly.  Mediastinal left hilar adenopathy.  Bilateral mild pleural effusions with left greater than right bibasilar   airspace consolidations.    < end of copied text >      CRITICAL CARE TIME SPENT: ***

## 2017-11-03 NOTE — PROGRESS NOTE ADULT - PROBLEM SELECTOR PLAN 2
metoprolol, digoxin  rate now controlled rate control of AF is paramount  EP requested CTA Chest revealing all 4 chambers of heart dilated w suboptimal filling to rule out PE but no gross main PE

## 2017-11-03 NOTE — PROGRESS NOTE ADULT - ATTENDING COMMENTS
Pt remains at high risk for reintubation and hemodynamic instability and thus is critically ill. Close airway surviellance. Pt remains at high risk for reintubation and hemodynamic instability and thus is critically ill. Close airway surveillance.

## 2017-11-03 NOTE — PROGRESS NOTE ADULT - PROBLEM SELECTOR PLAN 4
sputum grew out pseudomonas and CT Chest shows LLL infiltrate  will start Zosyn   high risk for reintubation

## 2017-11-03 NOTE — PROGRESS NOTE ADULT - PROBLEM SELECTOR PLAN 3
pan cultured, wbc nl procalcitonin neg do not believe infection at this time metoprolol, digoxin  rate now controlled

## 2017-11-04 DIAGNOSIS — A41.9 SEPSIS, UNSPECIFIED ORGANISM: ICD-10-CM

## 2017-11-04 DIAGNOSIS — J69.0 PNEUMONITIS DUE TO INHALATION OF FOOD AND VOMIT: ICD-10-CM

## 2017-11-04 DIAGNOSIS — R41.82 ALTERED MENTAL STATUS, UNSPECIFIED: ICD-10-CM

## 2017-11-04 LAB
AMMONIA BLD-MCNC: 40 UMOL/L — SIGNIFICANT CHANGE UP (ref 11–55)
ANION GAP SERPL CALC-SCNC: 10 MMOL/L — SIGNIFICANT CHANGE UP (ref 5–17)
BUN SERPL-MCNC: 12 MG/DL — SIGNIFICANT CHANGE UP (ref 8–20)
CALCIUM SERPL-MCNC: 8.3 MG/DL — LOW (ref 8.6–10.2)
CHLORIDE SERPL-SCNC: 99 MMOL/L — SIGNIFICANT CHANGE UP (ref 98–107)
CO2 SERPL-SCNC: 30 MMOL/L — HIGH (ref 22–29)
CREAT SERPL-MCNC: 0.52 MG/DL — SIGNIFICANT CHANGE UP (ref 0.5–1.3)
GLUCOSE SERPL-MCNC: 82 MG/DL — SIGNIFICANT CHANGE UP (ref 70–115)
POTASSIUM SERPL-MCNC: 4.2 MMOL/L — SIGNIFICANT CHANGE UP (ref 3.5–5.3)
POTASSIUM SERPL-SCNC: 4.2 MMOL/L — SIGNIFICANT CHANGE UP (ref 3.5–5.3)
SODIUM SERPL-SCNC: 139 MMOL/L — SIGNIFICANT CHANGE UP (ref 135–145)

## 2017-11-04 PROCEDURE — 70450 CT HEAD/BRAIN W/O DYE: CPT | Mod: 26

## 2017-11-04 PROCEDURE — 71010: CPT | Mod: 26,76

## 2017-11-04 PROCEDURE — 99291 CRITICAL CARE FIRST HOUR: CPT

## 2017-11-04 RX ORDER — SODIUM CHLORIDE 9 MG/ML
1000 INJECTION, SOLUTION INTRAVENOUS ONCE
Qty: 0 | Refills: 0 | Status: COMPLETED | OUTPATIENT
Start: 2017-11-04 | End: 2017-11-04

## 2017-11-04 RX ORDER — FENTANYL CITRATE 50 UG/ML
25 INJECTION INTRAVENOUS ONCE
Qty: 0 | Refills: 0 | Status: DISCONTINUED | OUTPATIENT
Start: 2017-11-04 | End: 2017-11-05

## 2017-11-04 RX ORDER — METOPROLOL TARTRATE 50 MG
5 TABLET ORAL EVERY 6 HOURS
Qty: 0 | Refills: 0 | Status: DISCONTINUED | OUTPATIENT
Start: 2017-11-04 | End: 2017-11-04

## 2017-11-04 RX ORDER — DEXMEDETOMIDINE HYDROCHLORIDE IN 0.9% SODIUM CHLORIDE 4 UG/ML
0.5 INJECTION INTRAVENOUS
Qty: 200 | Refills: 0 | Status: DISCONTINUED | OUTPATIENT
Start: 2017-11-04 | End: 2017-11-04

## 2017-11-04 RX ADMIN — Medication 3 MILLILITER(S): at 15:10

## 2017-11-04 RX ADMIN — Medication 3 MILLILITER(S): at 21:29

## 2017-11-04 RX ADMIN — PIPERACILLIN AND TAZOBACTAM 25 GRAM(S): 4; .5 INJECTION, POWDER, LYOPHILIZED, FOR SOLUTION INTRAVENOUS at 06:01

## 2017-11-04 RX ADMIN — SODIUM CHLORIDE 2000 MILLILITER(S): 9 INJECTION, SOLUTION INTRAVENOUS at 09:20

## 2017-11-04 RX ADMIN — SODIUM CHLORIDE 50 MILLILITER(S): 9 INJECTION, SOLUTION INTRAVENOUS at 19:06

## 2017-11-04 RX ADMIN — Medication 100 MILLIGRAM(S): at 19:06

## 2017-11-04 RX ADMIN — Medication 650 MILLIGRAM(S): at 11:13

## 2017-11-04 RX ADMIN — Medication 0.5 MILLIGRAM(S): at 06:09

## 2017-11-04 RX ADMIN — Medication 3 MILLILITER(S): at 04:27

## 2017-11-04 RX ADMIN — Medication 1 MILLIGRAM(S): at 11:13

## 2017-11-04 RX ADMIN — PIPERACILLIN AND TAZOBACTAM 25 GRAM(S): 4; .5 INJECTION, POWDER, LYOPHILIZED, FOR SOLUTION INTRAVENOUS at 22:46

## 2017-11-04 RX ADMIN — Medication 81 MILLIGRAM(S): at 11:13

## 2017-11-04 RX ADMIN — Medication 3 MILLILITER(S): at 08:00

## 2017-11-04 RX ADMIN — Medication 105 MILLIGRAM(S): at 06:02

## 2017-11-04 RX ADMIN — Medication 1 TABLET(S): at 11:13

## 2017-11-04 RX ADMIN — Medication 1 PATCH: at 11:13

## 2017-11-04 RX ADMIN — ENOXAPARIN SODIUM 40 MILLIGRAM(S): 100 INJECTION SUBCUTANEOUS at 11:13

## 2017-11-04 RX ADMIN — PIPERACILLIN AND TAZOBACTAM 25 GRAM(S): 4; .5 INJECTION, POWDER, LYOPHILIZED, FOR SOLUTION INTRAVENOUS at 14:16

## 2017-11-04 NOTE — PROGRESS NOTE ADULT - PROBLEM SELECTOR PLAN 5
Continue antibiotic therapy with zosyn. Sputum cultures grew pseudomonas. Suction PRN. Blood cultures neg for growth.

## 2017-11-04 NOTE — PROGRESS NOTE ADULT - PROBLEM SELECTOR PLAN 4
Related to aspiration pneumonia. Continue antibiotic therapy. Blood cultures neg for growth. Lactate normal. Hemodynamically stable.

## 2017-11-04 NOTE — PROGRESS NOTE ADULT - SUBJECTIVE AND OBJECTIVE BOX
Patient is a 62y old  Male who presents with a chief complaint of Shortness of breath (30 Oct 2017 15:03)      BRIEF HOSPITAL COURSE: ***    Events last 24 hours: ***    PAST MEDICAL & SURGICAL HISTORY:  Falls  Meningitis  Collapsed lung  Alcohol withdrawal  Emphysema of lung  ETOH abuse  Cirrhosis  Afib  CHF (congestive heart failure)  Poor historian  Alcohol abuse  Chronic atrial fibrillation  S/P BKA (below knee amputation), left: secondary to worsening infection in lower leg. Had both ankle injuries from fall s/p repair - left had complication.  S/P BKA (below knee amputation) unilateral, left      Review of Systems:  CONSTITUTIONAL: No fever, chills, or fatigue  EYES: No eye pain, visual disturbances, or discharge  ENMT:  No difficulty hearing, tinnitus, vertigo; No sinus or throat pain  NECK: No pain or stiffness  RESPIRATORY: No cough, wheezing, chills or hemoptysis; No shortness of breath  CARDIOVASCULAR: No chest pain, palpitations, dizziness, or leg swelling  GASTROINTESTINAL: No abdominal or epigastric pain. No nausea, vomiting, or hematemesis; No diarrhea or constipation. No melena or hematochezia.  GENITOURINARY: No dysuria, frequency, hematuria, or incontinence  NEUROLOGICAL: No headaches, memory loss, loss of strength, numbness, or tremors  SKIN: No itching, burning, rashes, or lesions   MUSCULOSKELETAL: No joint pain or swelling; No muscle, back, or extremity pain  PSYCHIATRIC: No depression, anxiety, mood swings, or difficulty sleeping      Medications:  piperacillin/tazobactam IVPB. 3.375 Gram(s) IV Intermittent every 8 hours    furosemide    Tablet 20 milliGRAM(s) Oral daily  lisinopril 5 milliGRAM(s) Oral daily  metoprolol     tartrate 100 milliGRAM(s) Oral two times a day    ALBUTerol    0.083% 2.5 milliGRAM(s) Nebulizer every 4 hours PRN  ALBUTerol/ipratropium for Nebulization 3 milliLiter(s) Nebulizer every 6 hours    acetaminophen    Suspension 650 milliGRAM(s) Oral every 6 hours PRN  acetaminophen   Tablet 650 milliGRAM(s) Oral every 6 hours PRN  acetaminophen   Tablet. 650 milliGRAM(s) Oral every 6 hours PRN  chlordiazePOXIDE 50 milliGRAM(s) Oral every 6 hours  dexmedetomidine Infusion 0.5 MICROgram(s)/kG/Hr IV Continuous <Continuous>  LORazepam   Injectable 2 milliGRAM(s) IV Push every 1 hour PRN  ondansetron Injectable 4 milliGRAM(s) IV Push every 6 hours PRN      aspirin  chewable 81 milliGRAM(s) Oral daily  enoxaparin Injectable 40 milliGRAM(s) SubCutaneous daily          folic acid 1 milliGRAM(s) Oral daily  multiple electrolytes Injection Type 1 1000 milliLiter(s) IV Continuous <Continuous>  multiple electrolytes Injection Type 1 Bolus 1000 milliLiter(s) IV Bolus once  multivitamin 1 Tablet(s) Oral daily    influenza   Vaccine 0.5 milliLiter(s) IntraMuscular once      nicotine - 21 mG/24Hr(s) Patch 1 patch Transdermal daily      Mode: AC/ CMV (Assist Control/ Continuous Mandatory Ventilation)  RR (machine): 12  TV (machine): 480  FiO2: 40  PEEP: 5  MAP: 11  PIP: 25      ICU Vital Signs Last 24 Hrs  T(C): 37.4 (04 Nov 2017 08:01), Max: 37.4 (04 Nov 2017 08:01)  T(F): 99.4 (04 Nov 2017 08:01), Max: 99.4 (04 Nov 2017 08:01)  HR: 72 (04 Nov 2017 12:12) (72 - 139)  BP: 120/61 (04 Nov 2017 11:00) (78/43 - 160/72)  BP(mean): 84 (04 Nov 2017 11:00) (55 - 104)  ABP: --  ABP(mean): --  RR: 17 (04 Nov 2017 11:00) (17 - 43)  SpO2: 98% (04 Nov 2017 12:12) (84% - 100%)          I&O's Detail    03 Nov 2017 07:01  -  04 Nov 2017 07:00  --------------------------------------------------------  IN:    multiple electrolytes Injection Type 1: 550 mL    Solution: 100 mL  Total IN: 650 mL    OUT:    Incontinent per Condom Catheter: 180 mL  Total OUT: 180 mL    Total NET: 470 mL      04 Nov 2017 08:01  -  04 Nov 2017 13:07  --------------------------------------------------------  IN:    Enteral Tube Flush: 250 mL    Jevity: 30 mL    multiple electrolytes Injection Type 1: 200 mL    Solution: 1000 mL  Total IN: 1480 mL    OUT:  Total OUT: 0 mL    Total NET: 1480 mL            LABS:                        12.3   7.6   )-----------( 93       ( 03 Nov 2017 06:00 )             38.5     11-04    139  |  99  |  12.0  ----------------------------<  82  4.2   |  30.0<H>  |  0.52    Ca    8.3<L>      04 Nov 2017 07:27  Phos  3.4     11-03  Mg     2.0     11-03            CAPILLARY BLOOD GLUCOSE            CULTURES:  Culture Results:   Moderate Pseudomonas aeruginosa  Few Routine respiratory raymundo present (11-01-17 @ 15:10)  Culture Results:   No growth at 48 hours (11-01-17 @ 15:09)  Culture Results:   No growth at 48 hours (11-01-17 @ 15:08)      Physical Examination:    General: No acute distress.  Alert, oriented, interactive, nonfocal    HEENT: Pupils equal, reactive to light.  Symmetric.    PULM: Clear to auscultation bilaterally, no significant sputum production    CVS: Regular rate and rhythm, no murmurs, rubs, or gallops    ABD: Soft, nondistended, nontender, normoactive bowel sounds, no masses    EXT: No edema, nontender    SKIN: Warm and well perfused, no rashes noted.    NEURO: A&Ox3, strength 5/5 all extremities, cranial nerves grossly intact, no focal deficits    RADIOLOGY: ***    CRITICAL CARE TIME SPENT: ***  Evaluating/treating patient, reviewing data/labs/imaging, discussing case with multidisciplinary team, discussing plan/goals of care with patient/family. Non-inclusive of procedure time. Patient is a 62y old  Male who presents with a chief complaint of Shortness of breath (30 Oct 2017 15:03)      BRIEF HOSPITAL COURSE: 63 y/o M with a h/o COPD, a-fib (no a/c), CHF, alcoholic cirrhosis, EtOH abuse, EtOH withdrawal (intubated in past for airway protection), initially admitted on 10/28 for COPD exacerbation and alcohol intoxication, signed out AMA and was found later on in hospital disoriented. Readmitted and RRT called later on when patient became tremulous, agitated, delirious, and began hallucinating. Transferred to MICU for further management with benzos and precedex gtt. Patient became obtunded, lost ability to protect airway, and was subsequently intubated. As per report, patient is highly sensitive to benzodiazepines.  CT head neg for acute events. Course complicated by a-fib with RVR, sepsis, pseudomonas aspiration pneumonia. Self extubated on 11/2 without need for reintubation.    Events last 24 hours: Patient obtunded today, not responsive to verbal or noxious stimuli. Snoring respirations and difficulty managing secretions. Reintubated for airway protection.    PAST MEDICAL & SURGICAL HISTORY:  Falls  Meningitis  Collapsed lung  Alcohol withdrawal  Emphysema of lung  ETOH abuse  Cirrhosis  Afib  CHF (congestive heart failure)  Poor historian  Alcohol abuse  Chronic atrial fibrillation  S/P BKA (below knee amputation), left: secondary to worsening infection in lower leg. Had both ankle injuries from fall s/p repair - left had complication.  S/P BKA (below knee amputation) unilateral, left      Review of Systems:  CONSTITUTIONAL: No fever, chills, or fatigue  EYES: No eye pain, visual disturbances, or discharge  ENMT:  No difficulty hearing, tinnitus, vertigo; No sinus or throat pain  NECK: No pain or stiffness  RESPIRATORY: No cough, wheezing, chills or hemoptysis; No shortness of breath  CARDIOVASCULAR: No chest pain, palpitations, dizziness, or leg swelling  GASTROINTESTINAL: No abdominal or epigastric pain. No nausea, vomiting, or hematemesis; No diarrhea or constipation. No melena or hematochezia.  GENITOURINARY: No dysuria, frequency, hematuria, or incontinence  NEUROLOGICAL: No headaches, memory loss, loss of strength, numbness, or tremors  SKIN: No itching, burning, rashes, or lesions   MUSCULOSKELETAL: No joint pain or swelling; No muscle, back, or extremity pain  PSYCHIATRIC: No depression, anxiety, mood swings, or difficulty sleeping      Medications:  piperacillin/tazobactam IVPB. 3.375 Gram(s) IV Intermittent every 8 hours    furosemide    Tablet 20 milliGRAM(s) Oral daily  lisinopril 5 milliGRAM(s) Oral daily  metoprolol     tartrate 100 milliGRAM(s) Oral two times a day    ALBUTerol    0.083% 2.5 milliGRAM(s) Nebulizer every 4 hours PRN  ALBUTerol/ipratropium for Nebulization 3 milliLiter(s) Nebulizer every 6 hours    acetaminophen    Suspension 650 milliGRAM(s) Oral every 6 hours PRN  acetaminophen   Tablet 650 milliGRAM(s) Oral every 6 hours PRN  acetaminophen   Tablet. 650 milliGRAM(s) Oral every 6 hours PRN  chlordiazePOXIDE 50 milliGRAM(s) Oral every 6 hours  dexmedetomidine Infusion 0.5 MICROgram(s)/kG/Hr IV Continuous <Continuous>  LORazepam   Injectable 2 milliGRAM(s) IV Push every 1 hour PRN  ondansetron Injectable 4 milliGRAM(s) IV Push every 6 hours PRN      aspirin  chewable 81 milliGRAM(s) Oral daily  enoxaparin Injectable 40 milliGRAM(s) SubCutaneous daily          folic acid 1 milliGRAM(s) Oral daily  multiple electrolytes Injection Type 1 1000 milliLiter(s) IV Continuous <Continuous>  multiple electrolytes Injection Type 1 Bolus 1000 milliLiter(s) IV Bolus once  multivitamin 1 Tablet(s) Oral daily    influenza   Vaccine 0.5 milliLiter(s) IntraMuscular once      nicotine - 21 mG/24Hr(s) Patch 1 patch Transdermal daily      Mode: AC/ CMV (Assist Control/ Continuous Mandatory Ventilation)  RR (machine): 12  TV (machine): 480  FiO2: 40  PEEP: 5  MAP: 11  PIP: 25      ICU Vital Signs Last 24 Hrs  T(C): 37.4 (04 Nov 2017 08:01), Max: 37.4 (04 Nov 2017 08:01)  T(F): 99.4 (04 Nov 2017 08:01), Max: 99.4 (04 Nov 2017 08:01)  HR: 72 (04 Nov 2017 12:12) (72 - 139)  BP: 120/61 (04 Nov 2017 11:00) (78/43 - 160/72)  BP(mean): 84 (04 Nov 2017 11:00) (55 - 104)  ABP: --  ABP(mean): --  RR: 17 (04 Nov 2017 11:00) (17 - 43)  SpO2: 98% (04 Nov 2017 12:12) (84% - 100%)          I&O's Detail    03 Nov 2017 07:01  -  04 Nov 2017 07:00  --------------------------------------------------------  IN:    multiple electrolytes Injection Type 1: 550 mL    Solution: 100 mL  Total IN: 650 mL    OUT:    Incontinent per Condom Catheter: 180 mL  Total OUT: 180 mL    Total NET: 470 mL      04 Nov 2017 08:01  -  04 Nov 2017 13:07  --------------------------------------------------------  IN:    Enteral Tube Flush: 250 mL    Jevity: 30 mL    multiple electrolytes Injection Type 1: 200 mL    Solution: 1000 mL  Total IN: 1480 mL    OUT:  Total OUT: 0 mL    Total NET: 1480 mL            LABS:                        12.3   7.6   )-----------( 93       ( 03 Nov 2017 06:00 )             38.5     11-04    139  |  99  |  12.0  ----------------------------<  82  4.2   |  30.0<H>  |  0.52    Ca    8.3<L>      04 Nov 2017 07:27  Phos  3.4     11-03  Mg     2.0     11-03            CAPILLARY BLOOD GLUCOSE            CULTURES:  Culture Results:   Moderate Pseudomonas aeruginosa  Few Routine respiratory raymundo present (11-01-17 @ 15:10)  Culture Results:   No growth at 48 hours (11-01-17 @ 15:09)  Culture Results:   No growth at 48 hours (11-01-17 @ 15:08)      Physical Examination:    General: No acute distress.  Alert, oriented, interactive, nonfocal    HEENT: Pupils equal, reactive to light.  Symmetric.    PULM: Clear to auscultation bilaterally, no significant sputum production    CVS: Regular rate and rhythm, no murmurs, rubs, or gallops    ABD: Soft, nondistended, nontender, normoactive bowel sounds, no masses    EXT: No edema, nontender    SKIN: Warm and well perfused, no rashes noted.    NEURO: A&Ox3, strength 5/5 all extremities, cranial nerves grossly intact, no focal deficits    RADIOLOGY: ***    CRITICAL CARE TIME SPENT: ***  Evaluating/treating patient, reviewing data/labs/imaging, discussing case with multidisciplinary team, discussing plan/goals of care with patient/family. Non-inclusive of procedure time. Patient is a 62y old  Male who presents with a chief complaint of Shortness of breath (30 Oct 2017 15:03)      BRIEF HOSPITAL COURSE: 63 y/o M with a h/o COPD, a-fib (no a/c), CHF, alcoholic cirrhosis, EtOH abuse, EtOH withdrawal (intubated in past for airway protection), initially admitted on 10/28 for COPD exacerbation and alcohol intoxication, signed out AMA and was found later on in hospital disoriented. Readmitted and RRT called later on when patient became tremulous, agitated, delirious, and began hallucinating. Transferred to MICU for further management with benzos and precedex gtt. Patient became obtunded, lost ability to protect airway, and was subsequently intubated. As per report, patient is highly sensitive to benzodiazepines.  CT head neg for acute events. Course complicated by a-fib with RVR, sepsis, pseudomonas aspiration pneumonia. Self extubated on 11/2 without need for reintubation.    Events last 24 hours: Patient obtunded today, not responsive to verbal or noxious stimuli. Snoring respirations and difficulty managing secretions. Reintubated for airway protection. Patient has h/o tracheostomy and subglottic stenosis, 7.5 ETT would not advance well through trachea, however, was pulling good tidal volumes. Later on, decision made to reintubate with 7.0 ETT so it would be more secure.    PAST MEDICAL & SURGICAL HISTORY:  Falls  Meningitis  Collapsed lung  Alcohol withdrawal  Emphysema of lung  ETOH abuse  Cirrhosis  Afib  CHF (congestive heart failure)  Poor historian  Alcohol abuse  Chronic atrial fibrillation  S/P BKA (below knee amputation), left: secondary to worsening infection in lower leg. Had both ankle injuries from fall s/p repair - left had complication.  S/P BKA (below knee amputation) unilateral, left      Review of Systems: Unable to obtain secondary to mental status, patient now intubated        Medications:  piperacillin/tazobactam IVPB. 3.375 Gram(s) IV Intermittent every 8 hours  furosemide    Tablet 20 milliGRAM(s) Oral daily  lisinopril 5 milliGRAM(s) Oral daily  metoprolol     tartrate 100 milliGRAM(s) Oral two times a day  ALBUTerol    0.083% 2.5 milliGRAM(s) Nebulizer every 4 hours PRN  ALBUTerol/ipratropium for Nebulization 3 milliLiter(s) Nebulizer every 6 hours  acetaminophen    Suspension 650 milliGRAM(s) Oral every 6 hours PRN  acetaminophen   Tablet 650 milliGRAM(s) Oral every 6 hours PRN  acetaminophen   Tablet. 650 milliGRAM(s) Oral every 6 hours PRN  chlordiazePOXIDE 50 milliGRAM(s) Oral every 6 hours  dexmedetomidine Infusion 0.5 MICROgram(s)/kG/Hr IV Continuous <Continuous>  LORazepam   Injectable 2 milliGRAM(s) IV Push every 1 hour PRN  ondansetron Injectable 4 milliGRAM(s) IV Push every 6 hours PRN  aspirin  chewable 81 milliGRAM(s) Oral daily  enoxaparin Injectable 40 milliGRAM(s) SubCutaneous daily  folic acid 1 milliGRAM(s) Oral daily  multiple electrolytes Injection Type 1 1000 milliLiter(s) IV Continuous <Continuous>  multiple electrolytes Injection Type 1 Bolus 1000 milliLiter(s) IV Bolus once  multivitamin 1 Tablet(s) Oral daily  influenza   Vaccine 0.5 milliLiter(s) IntraMuscular once  nicotine - 21 mG/24Hr(s) Patch 1 patch Transdermal daily      Mode: AC/ CMV (Assist Control/ Continuous Mandatory Ventilation)  RR (machine): 12  TV (machine): 480  FiO2: 40  PEEP: 5  MAP: 11  PIP: 25      ICU Vital Signs Last 24 Hrs  T(C): 37.4 (04 Nov 2017 08:01), Max: 37.4 (04 Nov 2017 08:01)  T(F): 99.4 (04 Nov 2017 08:01), Max: 99.4 (04 Nov 2017 08:01)  HR: 72 (04 Nov 2017 12:12) (72 - 139)  BP: 120/61 (04 Nov 2017 11:00) (78/43 - 160/72)  BP(mean): 84 (04 Nov 2017 11:00) (55 - 104)  ABP: --  ABP(mean): --  RR: 17 (04 Nov 2017 11:00) (17 - 43)  SpO2: 98% (04 Nov 2017 12:12) (84% - 100%)          I&O's Detail    03 Nov 2017 07:01  -  04 Nov 2017 07:00  --------------------------------------------------------  IN:    multiple electrolytes Injection Type 1: 550 mL    Solution: 100 mL  Total IN: 650 mL    OUT:    Incontinent per Condom Catheter: 180 mL  Total OUT: 180 mL    Total NET: 470 mL      04 Nov 2017 08:01  -  04 Nov 2017 13:07  --------------------------------------------------------  IN:    Enteral Tube Flush: 250 mL    Jevity: 30 mL    multiple electrolytes Injection Type 1: 200 mL    Solution: 1000 mL  Total IN: 1480 mL    OUT:  Total OUT: 0 mL    Total NET: 1480 mL            LABS:                        12.3   7.6   )-----------( 93       ( 03 Nov 2017 06:00 )             38.5     11-04    139  |  99  |  12.0  ----------------------------<  82  4.2   |  30.0<H>  |  0.52    Ca    8.3<L>      04 Nov 2017 07:27  Phos  3.4     11-03  Mg     2.0     11-03            CAPILLARY BLOOD GLUCOSE            CULTURES:  Culture Results:   Moderate Pseudomonas aeruginosa  Few Routine respiratory raymundo present (11-01-17 @ 15:10)  Culture Results:   No growth at 48 hours (11-01-17 @ 15:09)  Culture Results:   No growth at 48 hours (11-01-17 @ 15:08)      Physical Examination:    General: Obtunded, now intubated, on vent    HEENT: Pupils equal, reactive to light.  Symmetric.    PULM: coarse breath sounds bilaterally, (+) significant sputum production    CVS: Regular rate, irregularly irregular rhythm, no murmurs, rubs, or gallops    ABD: Soft, nondistended, nontender, normoactive bowel sounds, no masses    EXT: No edema, nontender    SKIN: Warm and well perfused, no rashes noted.    NEURO: A&Ox0, obtunded, not responsive to verbal or noxious stimuli, moving all extremities spontaneously    RADIOLOGY:     < from: CT Angio Chest w/ IV Cont (11.03.17 @ 17:10) >  FINDINGS:  No central pulmonary emboli seen. The lower lobar pulmonary arteries are   not adequately opacified to exclude peripheral lower lobe emboli. Upper   lobe pulmonary arteries are grossly patent. The  There is grosscardiomegaly with biventricular and left atrial   enlargement. No pericardial effusion. The  There is a mediastinal and left hilar adenopathy.  There are mild bilateral pleural effusions with basilar  left greater   than right peripheral basilar airspace consolidation..    Marginal osteophytes are noted at multiple disc spaces in the spine.   IMPRESSION:    No evidence of central pulmonary emboli. Peripheral lower lobe branches   cannot be adequately evaluated due to poor opacification..  Gross cardiomegaly.  Mediastinal left hilar adenopathy.  Bilateral mild pleural effusions with left greater than right bibasilar   airspace consolidations.    < from: CT Head No Cont (10.31.17 @ 23:08) >  Impression:     Motion degraded study. No acute intracranial hemorrhage or mass effect,   given the extent of artifact. Evaluation of infarct is limited especially   in the lower brain and posterior fossa due to significant patient motion.   If clinically indicated and when patient is able to cooperate after   adequate sedation, a short-term follow-up or an MRI may be obtained for   further evaluation.         CRITICAL CARE TIME SPENT: 55 mins  Evaluating/treating patient, reviewing data/labs/imaging, discussing case with multidisciplinary team, discussing plan/goals of care with patient/family. Non-inclusive of procedure time.

## 2017-11-04 NOTE — PROGRESS NOTE ADULT - ATTENDING COMMENTS
Pt seen and examined on rounds with CCM PA, agree with above assessment and plan. Recurrent respiratory failure with aspiration in the setting of delirium sparked by acute alcohol withdrawal. ETOH Cardiomyopathy with AF now rate controlled. Pt's mental status not returning to baseline despite no sedatives for over 24 hours. CT head repeated and negative. Ammonia level WNL, pt has some component of delirium remaining but may be having subclinical seizures will obtain EEG. Both daughters were updated- Litzy is now back in town and lives locally [phone] updated at bedside as to plan as well as Adela updated via phone. Pt is critically ill on vent support with alteration of mental status and aspiration pneumonia.   total attending CCM time 60min

## 2017-11-04 NOTE — PROCEDURE NOTE - ADDITIONAL PROCEDURE DETAILS
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
Tube exchange over bougie with smaller ETT (7.0) completed without complication. Tube passed through subglottic stenosis fairly easily. Some bloody mucus noted in ETT- lavaged and suctioned.
Patient has h/o tracheostomy, scar tissue in trachea, tiny airway- difficult to advance ETT down airway. Initially was not pulling adequate tidal volumes so attempt was made to further advance tube. Now pulling good volumes.

## 2017-11-04 NOTE — PROGRESS NOTE ADULT - ASSESSMENT
61 y/o M with a h/o COPD, a-fib (no a/c), CHF, alcoholic cardiomyopathy, alcoholic cirrhosis, EtOH abuse, EtOH withdrawal with EtOH withdrawal, acute hypoxic respiratory failure requiring intubation and reintubation, AMS, sepsis, aspiration pneumonia.

## 2017-11-04 NOTE — PROGRESS NOTE ADULT - PROBLEM SELECTOR PLAN 3
Etiology not entirely clear. Cannot exclude component of toxic metabolic encephalopathy. Hold all sedative medications and continue closely monitoring mental status for changes. Now intubated as he was not protecting his airway secondary to obtundation. CT head from 10/31 was neg for acute events. Consider repeat. Aspiration precautions. Etiology not entirely clear. Cannot exclude component of toxic metabolic encephalopathy. Hold all sedative medications and continue closely monitoring mental status for changes. Now intubated as he was not protecting his airway secondary to obtundation. CT head from 10/31 was neg for acute events. Will repeat now. Aspiration precautions.

## 2017-11-04 NOTE — PROGRESS NOTE ADULT - PROBLEM SELECTOR PLAN 1
Reintubated for airway protection and then tube exchanged due to subglottic stenosis. Continue mechanical ventilation. On full vent support. Will titrate to maintain SaO2 > 92% and wean as tolerated. Will not sedate in order to monitor for improvement in mental status. Suction PRN.

## 2017-11-04 NOTE — PROGRESS NOTE ADULT - PROBLEM SELECTOR PLAN 2
No further signs of acute withdrawal. Will d/c all sedative medications and continue to monitor mental status.

## 2017-11-05 LAB
ANION GAP SERPL CALC-SCNC: 10 MMOL/L — SIGNIFICANT CHANGE UP (ref 5–17)
BUN SERPL-MCNC: 16 MG/DL — SIGNIFICANT CHANGE UP (ref 8–20)
CALCIUM SERPL-MCNC: 8.4 MG/DL — LOW (ref 8.6–10.2)
CHLORIDE SERPL-SCNC: 99 MMOL/L — SIGNIFICANT CHANGE UP (ref 98–107)
CO2 SERPL-SCNC: 30 MMOL/L — HIGH (ref 22–29)
CREAT SERPL-MCNC: 0.6 MG/DL — SIGNIFICANT CHANGE UP (ref 0.5–1.3)
GLUCOSE SERPL-MCNC: 121 MG/DL — HIGH (ref 70–115)
HCT VFR BLD CALC: 37.7 % — LOW (ref 42–52)
HGB BLD-MCNC: 12.2 G/DL — LOW (ref 14–18)
MAGNESIUM SERPL-MCNC: 1.9 MG/DL — SIGNIFICANT CHANGE UP (ref 1.6–2.6)
MCHC RBC-ENTMCNC: 30.5 PG — SIGNIFICANT CHANGE UP (ref 27–31)
MCHC RBC-ENTMCNC: 32.4 G/DL — SIGNIFICANT CHANGE UP (ref 32–36)
MCV RBC AUTO: 94.3 FL — HIGH (ref 80–94)
PHOSPHATE SERPL-MCNC: 2.6 MG/DL — SIGNIFICANT CHANGE UP (ref 2.4–4.7)
PLATELET # BLD AUTO: 120 K/UL — LOW (ref 150–400)
POTASSIUM SERPL-MCNC: 4.1 MMOL/L — SIGNIFICANT CHANGE UP (ref 3.5–5.3)
POTASSIUM SERPL-SCNC: 4.1 MMOL/L — SIGNIFICANT CHANGE UP (ref 3.5–5.3)
RBC # BLD: 4 M/UL — LOW (ref 4.6–6.2)
RBC # FLD: 16 % — HIGH (ref 11–15.6)
SODIUM SERPL-SCNC: 139 MMOL/L — SIGNIFICANT CHANGE UP (ref 135–145)
WBC # BLD: 6.7 K/UL — SIGNIFICANT CHANGE UP (ref 4.8–10.8)
WBC # FLD AUTO: 6.7 K/UL — SIGNIFICANT CHANGE UP (ref 4.8–10.8)

## 2017-11-05 PROCEDURE — 99291 CRITICAL CARE FIRST HOUR: CPT

## 2017-11-05 PROCEDURE — 99233 SBSQ HOSP IP/OBS HIGH 50: CPT

## 2017-11-05 RX ORDER — PANTOPRAZOLE SODIUM 20 MG/1
40 TABLET, DELAYED RELEASE ORAL DAILY
Qty: 0 | Refills: 0 | Status: DISCONTINUED | OUTPATIENT
Start: 2017-11-05 | End: 2017-11-29

## 2017-11-05 RX ORDER — MAGNESIUM SULFATE 500 MG/ML
1 VIAL (ML) INJECTION ONCE
Qty: 0 | Refills: 0 | Status: COMPLETED | OUTPATIENT
Start: 2017-11-05 | End: 2017-11-05

## 2017-11-05 RX ORDER — METOPROLOL TARTRATE 50 MG
25 TABLET ORAL EVERY 8 HOURS
Qty: 0 | Refills: 0 | Status: DISCONTINUED | OUTPATIENT
Start: 2017-11-05 | End: 2017-11-09

## 2017-11-05 RX ORDER — FENTANYL CITRATE 50 UG/ML
25 INJECTION INTRAVENOUS ONCE
Qty: 0 | Refills: 0 | Status: DISCONTINUED | OUTPATIENT
Start: 2017-11-05 | End: 2017-11-05

## 2017-11-05 RX ORDER — SENNA PLUS 8.6 MG/1
2 TABLET ORAL AT BEDTIME
Qty: 0 | Refills: 0 | Status: DISCONTINUED | OUTPATIENT
Start: 2017-11-05 | End: 2017-11-09

## 2017-11-05 RX ORDER — ACETAMINOPHEN 500 MG
650 TABLET ORAL EVERY 6 HOURS
Qty: 0 | Refills: 0 | Status: DISCONTINUED | OUTPATIENT
Start: 2017-11-05 | End: 2017-11-11

## 2017-11-05 RX ORDER — ENOXAPARIN SODIUM 100 MG/ML
40 INJECTION SUBCUTANEOUS DAILY
Qty: 0 | Refills: 0 | Status: DISCONTINUED | OUTPATIENT
Start: 2017-11-05 | End: 2017-11-16

## 2017-11-05 RX ORDER — DEXMEDETOMIDINE HYDROCHLORIDE IN 0.9% SODIUM CHLORIDE 4 UG/ML
0.3 INJECTION INTRAVENOUS
Qty: 200 | Refills: 0 | Status: DISCONTINUED | OUTPATIENT
Start: 2017-11-05 | End: 2017-11-09

## 2017-11-05 RX ORDER — CHLORHEXIDINE GLUCONATE 213 G/1000ML
15 SOLUTION TOPICAL
Qty: 0 | Refills: 0 | Status: DISCONTINUED | OUTPATIENT
Start: 2017-11-05 | End: 2017-12-14

## 2017-11-05 RX ADMIN — Medication 3 MILLILITER(S): at 15:40

## 2017-11-05 RX ADMIN — FENTANYL CITRATE 25 MICROGRAM(S): 50 INJECTION INTRAVENOUS at 04:07

## 2017-11-05 RX ADMIN — CHLORHEXIDINE GLUCONATE 15 MILLILITER(S): 213 SOLUTION TOPICAL at 17:02

## 2017-11-05 RX ADMIN — SENNA PLUS 2 TABLET(S): 8.6 TABLET ORAL at 22:29

## 2017-11-05 RX ADMIN — DEXMEDETOMIDINE HYDROCHLORIDE IN 0.9% SODIUM CHLORIDE 7.82 MICROGRAM(S)/KG/HR: 4 INJECTION INTRAVENOUS at 11:49

## 2017-11-05 RX ADMIN — Medication 100 GRAM(S): at 17:02

## 2017-11-05 RX ADMIN — Medication 81 MILLIGRAM(S): at 11:05

## 2017-11-05 RX ADMIN — Medication 25 MILLIGRAM(S): at 15:13

## 2017-11-05 RX ADMIN — Medication 3 MILLILITER(S): at 08:06

## 2017-11-05 RX ADMIN — PIPERACILLIN AND TAZOBACTAM 25 GRAM(S): 4; .5 INJECTION, POWDER, LYOPHILIZED, FOR SOLUTION INTRAVENOUS at 13:02

## 2017-11-05 RX ADMIN — DEXMEDETOMIDINE HYDROCHLORIDE IN 0.9% SODIUM CHLORIDE 7.82 MICROGRAM(S)/KG/HR: 4 INJECTION INTRAVENOUS at 22:29

## 2017-11-05 RX ADMIN — Medication 3 MILLILITER(S): at 21:38

## 2017-11-05 RX ADMIN — PIPERACILLIN AND TAZOBACTAM 25 GRAM(S): 4; .5 INJECTION, POWDER, LYOPHILIZED, FOR SOLUTION INTRAVENOUS at 22:28

## 2017-11-05 RX ADMIN — Medication 25 MILLIGRAM(S): at 22:29

## 2017-11-05 RX ADMIN — PIPERACILLIN AND TAZOBACTAM 25 GRAM(S): 4; .5 INJECTION, POWDER, LYOPHILIZED, FOR SOLUTION INTRAVENOUS at 05:19

## 2017-11-05 RX ADMIN — Medication 3 MILLILITER(S): at 03:12

## 2017-11-05 RX ADMIN — PANTOPRAZOLE SODIUM 40 MILLIGRAM(S): 20 TABLET, DELAYED RELEASE ORAL at 15:13

## 2017-11-05 RX ADMIN — ENOXAPARIN SODIUM 40 MILLIGRAM(S): 100 INJECTION SUBCUTANEOUS at 11:05

## 2017-11-05 RX ADMIN — Medication 1 TABLET(S): at 11:05

## 2017-11-05 RX ADMIN — Medication 20 MILLIGRAM(S): at 05:19

## 2017-11-05 RX ADMIN — FENTANYL CITRATE 25 MICROGRAM(S): 50 INJECTION INTRAVENOUS at 03:44

## 2017-11-05 RX ADMIN — Medication 1 PATCH: at 11:48

## 2017-11-05 RX ADMIN — Medication 1 PATCH: at 11:05

## 2017-11-05 RX ADMIN — DEXMEDETOMIDINE HYDROCHLORIDE IN 0.9% SODIUM CHLORIDE 7.82 MICROGRAM(S)/KG/HR: 4 INJECTION INTRAVENOUS at 09:04

## 2017-11-05 RX ADMIN — SODIUM CHLORIDE 50 MILLILITER(S): 9 INJECTION, SOLUTION INTRAVENOUS at 11:49

## 2017-11-05 RX ADMIN — Medication 1 MILLIGRAM(S): at 11:05

## 2017-11-05 NOTE — PROGRESS NOTE ADULT - PROBLEM SELECTOR PLAN 4
Related to aspiration pneumonia. Continue antibiotic therapy. Blood cultures neg for growth. Lactate normal. Hemodynamically stable.  Sputum growing pseudomonas =- continue Zosyn

## 2017-11-05 NOTE — PROGRESS NOTE ADULT - SUBJECTIVE AND OBJECTIVE BOX
Patient is a 62y old  Male who presents with a chief complaint of Shortness of breath (30 Oct 2017 15:03)      BRIEF HOSPITAL COURSE: 63 y/o M with a h/o COPD, a-fib (no a/c), CHF, alcoholic cirrhosis, EtOH abuse, EtOH withdrawal (intubated in past for airway protection), initially admitted on 10/28 for COPD exacerbation and alcohol intoxication, signed out AMA and was found later on in hospital disoriented. Readmitted and RRT called later on when patient became tremulous, agitated, delirious, and began hallucinating. Transferred to MICU for further management with benzos and precedex gtt. Patient became obtunded, lost ability to protect airway, and was subsequently intubated. As per report, patient is highly sensitive to benzodiazepines.  CT head neg for acute events. Course complicated by a-fib with RVR, sepsis, pseudomonas aspiration pneumonia. Self extubated on 11/2 without need for reintubation initially but on the morning of 11/4 patient obtunded  with difficulty managing secretions. Reintubated for airway protection.    Events last 24 hours: intubated, remained obtunded CT head performed-negative, some NSVT on monitor today     PAST MEDICAL & SURGICAL HISTORY:  Falls  Meningitis  Collapsed lung  Alcohol withdrawal  Emphysema of lung  ETOH abuse  Cirrhosis  Afib  CHF (congestive heart failure)  Poor historian  Alcohol abuse  Chronic atrial fibrillation  S/P BKA (below knee amputation), left: secondary to worsening infection in lower leg. Had both ankle injuries from fall s/p repair - left had complication.  S/P BKA (below knee amputation) unilateral, left      Review of Systems:  Cannot be obtained as pt is intubated.       Medications:  piperacillin/tazobactam IVPB. 3.375 Gram(s) IV Intermittent every 8 hours    furosemide    Tablet 20 milliGRAM(s) Oral daily  lisinopril 5 milliGRAM(s) Oral daily  metoprolol     tartrate 25 milliGRAM(s) Oral every 8 hours    ALBUTerol    0.083% 2.5 milliGRAM(s) Nebulizer every 4 hours PRN  ALBUTerol/ipratropium for Nebulization 3 milliLiter(s) Nebulizer every 6 hours    acetaminophen    Suspension 650 milliGRAM(s) Oral every 6 hours PRN  acetaminophen   Tablet 650 milliGRAM(s) Oral every 6 hours PRN  acetaminophen   Tablet. 650 milliGRAM(s) Oral every 6 hours PRN  dexmedetomidine Infusion 0.3 MICROgram(s)/kG/Hr IV Continuous <Continuous>      aspirin  chewable 81 milliGRAM(s) Oral daily  enoxaparin Injectable 40 milliGRAM(s) SubCutaneous daily    pantoprazole  Injectable 40 milliGRAM(s) IV Push daily        folic acid 1 milliGRAM(s) Oral daily  multiple electrolytes Injection Type 1 1000 milliLiter(s) IV Continuous <Continuous>  multivitamin 1 Tablet(s) Oral daily    influenza   Vaccine 0.5 milliLiter(s) IntraMuscular once    chlorhexidine 0.12% Liquid 15 milliLiter(s) Swish and Spit two times a day    nicotine - 21 mG/24Hr(s) Patch 1 patch Transdermal daily      Mode: AC/ CMV (Assist Control/ Continuous Mandatory Ventilation)  RR (machine): 12  TV (machine): 480  FiO2: 35  PEEP: 5  MAP: 9  PIP: 26      ICU Vital Signs Last 24 Hrs  T(C): 37.5 (05 Nov 2017 14:00), Max: 37.8 (05 Nov 2017 10:00)  T(F): 99.5 (05 Nov 2017 14:00), Max: 100 (05 Nov 2017 10:00)  HR: 68 (05 Nov 2017 14:00) (57 - 104)  BP: 136/69 (05 Nov 2017 14:00) (91/52 - 149/82)  BP(mean): 96 (05 Nov 2017 14:00) (65 - 109)  ABP: --  ABP(mean): --  RR: 17 (05 Nov 2017 14:00) (12 - 32)  SpO2: 98% (05 Nov 2017 14:00) (93% - 100%)          I&O's Detail    04 Nov 2017 08:01  -  05 Nov 2017 07:00  --------------------------------------------------------  IN:    Enteral Tube Flush: 250 mL    Jevity: 990 mL    multiple electrolytes Injection Type 1: 1200 mL    Solution: 150 mL    Solution: 1000 mL  Total IN: 3590 mL    OUT:    Indwelling Catheter - Urethral: 885 mL  Total OUT: 885 mL    Total NET: 2705 mL      05 Nov 2017 07:01  -  05 Nov 2017 14:25  --------------------------------------------------------  IN:    dexmedetomidine Infusion: 109.8 mL    Enteral Tube Flush: 50 mL    Jevity: 420 mL    multiple electrolytes Injection Type 1: 350 mL    Solution: 100 mL  Total IN: 1029.8 mL    OUT:    Indwelling Catheter - Urethral: 655 mL  Total OUT: 655 mL    Total NET: 374.8 mL            LABS:                        12.2   6.7   )-----------( 120      ( 05 Nov 2017 07:20 )             37.7     11-05    139  |  99  |  16.0  ----------------------------<  121<H>  4.1   |  30.0<H>  |  0.60    Ca    8.4<L>      05 Nov 2017 07:20  Phos  2.6     11-05  Mg     1.9     11-05            CAPILLARY BLOOD GLUCOSE            CULTURES:  Culture Results:   Moderate Pseudomonas aeruginosa  Few Routine respiratory raymundo present (11-01 @ 15:10)  Culture Results:   No growth at 48 hours (11-01 @ 15:09)  Culture Results:   No growth at 48 hours (11-01 @ 15:08)      Physical Examination:    General: No acute distress.      HEENT: Pupils equal, reactive to light.  Symmetric.    PULM: Course to auscultation bilaterally, moderate yellow/thick  sputum production    CVS: irregular    ABD: Soft, nondistended, nontender, normoactive bowel sounds, no masses    EXT: No edema, nontender, L BKA    SKIN: Warm and well perfused, no rashes noted.    NEURO: sedated, withdraws from noxious stim, Mansoor    RADIOLOGY: < from: CT Head No Cont (11.04.17 @ 16:55) >  COMPARISON: CT head 10/31/2017.    FINDINGS: No intracranial hemorrhage is seen. The grey-white   differentiation is maintained.     Ventricles and sulci are normal in size and configuration for patient's   age. No mass effect or midline shift is seen. Basal cisterns are not   effaced.    Moderate ethmoid and sphenoid sinuses also thickening with trace right   maxillary sinus mucosal thickening. No osseous abnormality seen.    IMPRESSION: No intracranial hemorrhage or mass effect.    < end of copied text >  < from: Xray Chest 1 View AP/PA. (11.04.17 @ 14:43) >  COMPARISON: No previous examinations are available for review.    FINDINGS: ET tube tip above tracheal bifurcation.  NG tube tip beyond GE junction.    The lungs  show left lower lobe airspace consolidation obscuring left   diaphragmatic contour. Left upper lobe and right lung parenchyma clear.       The  heart is enlarged in transverse diameter. No hilar mass. Trachea   midline.        Visualized osseous structures are intact.        IMPRESSION:   ET tube tip above tracheal bifurcation.  NG tube tip beyond GE junction.  . Left lower lobe airspace consolidation..          < end of copied text >    CRITICAL CARE TIME SPENT: 60min

## 2017-11-05 NOTE — PROGRESS NOTE ADULT - SUBJECTIVE AND OBJECTIVE BOX
[facility]                                                        Office: [address] [address]                                                       Telephone: [phone]. Fax: [phone]      CC: dizziness.     INTERVAL HISTORY: Now intubated.     MEDICATIONS  (STANDING):  ALBUTerol/ipratropium for Nebulization 3 milliLiter(s) Nebulizer every 6 hours  aspirin  chewable 81 milliGRAM(s) Oral daily  chlorhexidine 0.12% Liquid 15 milliLiter(s) Swish and Spit two times a day  dexmedetomidine Infusion 0.3 MICROgram(s)/kG/Hr (7.822 mL/Hr) IV Continuous <Continuous>  enoxaparin Injectable 40 milliGRAM(s) SubCutaneous daily  folic acid 1 milliGRAM(s) Oral daily  furosemide    Tablet 20 milliGRAM(s) Oral daily  influenza   Vaccine 0.5 milliLiter(s) IntraMuscular once  lisinopril 5 milliGRAM(s) Oral daily  magnesium sulfate  IVPB 1 Gram(s) IV Intermittent once  metoprolol     tartrate 25 milliGRAM(s) Oral every 8 hours  multivitamin 1 Tablet(s) Oral daily  nicotine - 21 mG/24Hr(s) Patch 1 patch Transdermal daily  pantoprazole  Injectable 40 milliGRAM(s) IV Push daily  piperacillin/tazobactam IVPB. 3.375 Gram(s) IV Intermittent every 8 hours    ROS: All others negative     PHYSICAL EXAM:  T(C): 37.5 (11-05-17 @ 16:00), Max: 37.8 (11-05-17 @ 10:00)  HR: 65 (11-05-17 @ 16:00) (57 - 104)  BP: 131/63 (11-05-17 @ 16:00) (91/52 - 149/82)  RR: 13 (11-05-17 @ 16:00) (12 - 32)  SpO2: 96% (11-05-17 @ 16:00) (93% - 100%)  Wt(kg): --  I&O's Summary    04 Nov 2017 08:01  -  05 Nov 2017 07:00  --------------------------------------------------------  IN: 3590 mL / OUT: 885 mL / NET: 2705 mL    05 Nov 2017 07:01  -  05 Nov 2017 16:56  --------------------------------------------------------  IN: 1299.4 mL / OUT: 800 mL / NET: 499.4 mL    Appearance: Normal	  HEENT:   Normal oral mucosa, PERRL, EOMI	  Lymphatic: No lymphadenopathy  Cardiovascular: Normal S1 S2, No JVD, No murmurs, No edema  Respiratory: Lungs clear to auscultation	  Psychiatry: A & O x 3, Mood & affect appropriate  Gastrointestinal:  Soft, Non-tender, + BS	  Skin: No rashes, No ecchymoses, No cyanosis  Neurologic: Non-focal  Extremities: Normal range of motion, No clubbing, cyanosis or edema  Vascular: Peripheral pulses palpable 2+ bilaterally    TELEMETRY: NSVT.       LABS:	 	                        12.2   6.7   )-----------( 120      ( 05 Nov 2017 07:20 )             37.7     11-05    139  |  99  |  16.0  ----------------------------<  121<H>  4.1   |  30.0<H>  |  0.60    Ca    8.4<L>      05 Nov 2017 07:20  Phos  2.6     11-05  Mg     1.9     11-05

## 2017-11-05 NOTE — PROGRESS NOTE ADULT - PROBLEM SELECTOR PLAN 3
Etiology not entirely clear. Likely hypoactive delirium with a component of toxic metabolic encephalopathy, cannot exclude NCSE plan for EEG

## 2017-11-05 NOTE — PROGRESS NOTE ADULT - PROBLEM SELECTOR PLAN 1
Reintubated for airway protection and then tube exchanged due to subglottic stenosis. Continue mechanical ventilation. On full vent support. Will titrate to maintain SaO2 > 92% and wean as tolerated  pt has required some light sedation for vent synchrony - continue precedex with daily spontaneous awakening trials and breathing trials

## 2017-11-05 NOTE — PROGRESS NOTE ADULT - PROBLEM SELECTOR PLAN 2
Now in sinus. No AC due to poor compliance. VT- unclear etiology, Mg 1.9. K 4.1. Give IV magnesium.   ? of new onset stress induced myopathy in the past few days after TTE was performed. (CT findings of cardiomyopathy) Will plan for repeat TTE.   use amiodarone if needed.

## 2017-11-05 NOTE — PROGRESS NOTE ADULT - ATTENDING COMMENTS
Pt is critically ill on vent support with alteration of mental status and aspiration pneumonia. Daughters updated via phone today.

## 2017-11-05 NOTE — PROGRESS NOTE ADULT - SUBJECTIVE AND OBJECTIVE BOX
Patient is a 62y old  Male who presents with a chief complaint of Shortness of breath (30 Oct 2017 15:03)      BRIEF HOSPITAL COURSE: 62 year old male PMHx. COPD, a-fib (no a/c), CHF, alcoholic cirrhosis, EtOH abuse, EtOH withdrawal (intubated in past for airway protection), initially admitted on 10/28 for COPD exacerbation and alcohol intoxication, signed out AMA and was found later on in hospital disoriented. Readmitted and RRT called later on when patient became tremulous, agitated, delirious, and began hallucinating. Transferred to MICU for further management with benzos and precedex gtt. Patient became obtunded, lost ability to protect airway, and was subsequently intubated. As per report, patient is highly sensitive to benzodiazepines.  CT head neg for acute events. Course complicated by a-fib with RVR, sepsis, pseudomonas aspiration pneumonia. Self extubated on 11/2 without need for reintubation initially but on the morning of 11/4 patient obtunded  with difficulty managing secretions. Reintubated for airway protection.    Events last 24 hours: Remains VDRF, Fio2 now 35%, MS still poor, lightly sedated for vent complince.    PAST MEDICAL & SURGICAL HISTORY:  Falls  Meningitis  Collapsed lung  Alcohol withdrawal  Emphysema of lung  ETOH abuse  Cirrhosis  Afib  CHF (congestive heart failure)  Poor historian  Alcohol abuse  Chronic atrial fibrillation  S/P BKA (below knee amputation), left: secondary to worsening infection in lower leg. Had both ankle injuries from fall s/p repair - left had complication.  S/P BKA (below knee amputation) unilateral, left      Review of Systems: Unable to access       Medications:  piperacillin/tazobactam IVPB. 3.375 Gram(s) IV Intermittent every 8 hours  furosemide    Tablet 20 milliGRAM(s) Oral daily  lisinopril 5 milliGRAM(s) Oral daily  metoprolol     tartrate 25 milliGRAM(s) Oral every 8 hours  ALBUTerol    0.083% 2.5 milliGRAM(s) Nebulizer every 4 hours PRN  ALBUTerol/ipratropium for Nebulization 3 milliLiter(s) Nebulizer every 6 hours  acetaminophen    Suspension 650 milliGRAM(s) Oral every 6 hours PRN  acetaminophen    Suspension 650 milliGRAM(s) Oral every 6 hours PRN  dexmedetomidine Infusion 0.3 MICROgram(s)/kG/Hr IV Continuous <Continuous>  aspirin  chewable 81 milliGRAM(s) Oral daily  enoxaparin Injectable 40 milliGRAM(s) SubCutaneous daily  pantoprazole  Injectable 40 milliGRAM(s) IV Push daily  senna 2 Tablet(s) Oral at bedtime  folic acid 1 milliGRAM(s) Oral daily  multivitamin 1 Tablet(s) Oral daily  influenza   Vaccine 0.5 milliLiter(s) IntraMuscular once  chlorhexidine 0.12% Liquid 15 milliLiter(s) Swish and Spit two times a day  nicotine - 21 mG/24Hr(s) Patch 1 patch Transdermal daily      Mode: AC/ CMV (Assist Control/ Continuous Mandatory Ventilation)  RR (machine): 12  TV (machine): 480  FiO2: 35  PEEP: 5  MAP: 11  PIP: 28      ICU Vital Signs Last 24 Hrs  T(C): 37.2 (06 Nov 2017 00:00), Max: 37.8 (05 Nov 2017 10:00)  T(F): 99 (06 Nov 2017 00:00), Max: 100 (05 Nov 2017 10:00)  HR: 59 (06 Nov 2017 01:09) (59 - 104)  BP: 140/76 (06 Nov 2017 01:00) (91/52 - 178/79)  BP(mean): 102 (06 Nov 2017 01:00) (68 - 113)  RR: 16 (06 Nov 2017 01:09) (13 - 32)  SpO2: 99% (06 Nov 2017 01:09) (96% - 100%)          I&O's Detail    04 Nov 2017 08:01  -  05 Nov 2017 07:00  --------------------------------------------------------  IN:    Enteral Tube Flush: 250 mL    Jevity: 990 mL    multiple electrolytes Injection Type 1multiple electrolytes Injection Type 1: 1200 mL    Solution: 150 mL    Solution: 1000 mL  Total IN: 3590 mL    OUT:    Indwelling Catheter - Urethral: 885 mL  Total OUT: 885 mL    Total NET: 2705 mL      05 Nov 2017 07:01  -  06 Nov 2017 02:28  --------------------------------------------------------  IN:    dexmedetomidine Infusion: 375.9 mL    Enteral Tube Flush: 220 mL    Jevity: 1080 mL    multiple electrolytes Injection Type 1multiple electrolytes Injection Type 1: 350 mL    Solution: 150 mL    Solution: 100 mL  Total IN: 2275.9 mL    OUT:    Indwelling Catheter - Urethral: 1185 mL  Total OUT: 1185 mL    Total NET: 1090.9 mL            LABS:                        12.2   6.7   )-----------( 120      ( 05 Nov 2017 07:20 )             37.7     11-05    139  |  99  |  16.0  ----------------------------<  121<H>  4.1   |  30.0<H>  |  0.60    Ca    8.4<L>      05 Nov 2017 07:20  Phos  2.6     11-05  Mg     1.9     11-05            CAPILLARY BLOOD GLUCOSE            CULTURES:  Culture Results:   Moderate Pseudomonas aeruginosa  Few Routine respiratory raymundo present (11-01 @ 15:10)  Culture Results:   No growth at 48 hours (11-01 @ 15:09)  Culture Results:   No growth at 48 hours (11-01 @ 15:08)            Physical Examination:    General: Sedated RASS -1 CAM unable to access.    HEENT: Pupils equal, reactive to light.  Symmetric.    PULM: Fine rhonchi  bilaterally, thick yellow sputum production    CVS: S1, S2 IRR, no murmurs, rubs, or gallops    ABD: Soft, nondistended, nontender, normoactive bowel sounds, no masses    EXT: No edema, nontender    SKIN: Warm and well perfused, no rashes noted.        RADIOLOGY:   < from: CT Head No Cont (11.04.17 @ 16:55) >   EXAM:  CT BRAIN                          PROCEDURE DATE:  11/04/2017          INTERPRETATION:  CLINICAL INFORMATION: Patient is obtunded    TECHNIQUE: Noncontrast axial CT images of the brain were acquired from   the base of skull to vertex.    COMPARISON: CT head 10/31/2017.    FINDINGS: No intracranial hemorrhage is seen. The grey-white   differentiation is maintained.     Ventricles and sulci are normal in size and configuration for patient's   age. No mass effect or midline shift is seen. Basal cisterns are not   effaced.    Moderate ethmoid and sphenoid sinuses also thickening with trace right   maxillary sinus mucosal thickening. No osseous abnormality seen.    IMPRESSION: No intracranial hemorrhage or mass effect.      < end of copied text >      CRITICAL CARE TIME SPENT: 35 minutes   (Reviewing data, imaging, discussing with multidisciplinary team, non inclusive of procedures, discussing goals of care with patient/family)

## 2017-11-05 NOTE — PROGRESS NOTE ADULT - PROBLEM SELECTOR PLAN 1
Continue CMV with lung protective settings   Vent bundle reviewed  Cont Precedex for vent compliance   Cont SATs and Weaning when MS improves

## 2017-11-06 DIAGNOSIS — F10.10 ALCOHOL ABUSE, UNCOMPLICATED: ICD-10-CM

## 2017-11-06 DIAGNOSIS — J69.0 PNEUMONITIS DUE TO INHALATION OF FOOD AND VOMIT: ICD-10-CM

## 2017-11-06 DIAGNOSIS — R41.82 ALTERED MENTAL STATUS, UNSPECIFIED: ICD-10-CM

## 2017-11-06 DIAGNOSIS — G93.40 ENCEPHALOPATHY, UNSPECIFIED: ICD-10-CM

## 2017-11-06 LAB
ANION GAP SERPL CALC-SCNC: 8 MMOL/L — SIGNIFICANT CHANGE UP (ref 5–17)
BUN SERPL-MCNC: 17 MG/DL — SIGNIFICANT CHANGE UP (ref 8–20)
CALCIUM SERPL-MCNC: 8.6 MG/DL — SIGNIFICANT CHANGE UP (ref 8.6–10.2)
CHLORIDE SERPL-SCNC: 101 MMOL/L — SIGNIFICANT CHANGE UP (ref 98–107)
CO2 SERPL-SCNC: 32 MMOL/L — HIGH (ref 22–29)
CREAT SERPL-MCNC: 0.59 MG/DL — SIGNIFICANT CHANGE UP (ref 0.5–1.3)
CULTURE RESULTS: SIGNIFICANT CHANGE UP
CULTURE RESULTS: SIGNIFICANT CHANGE UP
GLUCOSE SERPL-MCNC: 123 MG/DL — HIGH (ref 70–115)
HCT VFR BLD CALC: 38.2 % — LOW (ref 42–52)
HGB BLD-MCNC: 12.2 G/DL — LOW (ref 14–18)
MAGNESIUM SERPL-MCNC: 2 MG/DL — SIGNIFICANT CHANGE UP (ref 1.6–2.6)
MCHC RBC-ENTMCNC: 29.8 PG — SIGNIFICANT CHANGE UP (ref 27–31)
MCHC RBC-ENTMCNC: 31.9 G/DL — LOW (ref 32–36)
MCV RBC AUTO: 93.4 FL — SIGNIFICANT CHANGE UP (ref 80–94)
PHOSPHATE SERPL-MCNC: 3.4 MG/DL — SIGNIFICANT CHANGE UP (ref 2.4–4.7)
PLATELET # BLD AUTO: 135 K/UL — LOW (ref 150–400)
POTASSIUM SERPL-MCNC: 4.4 MMOL/L — SIGNIFICANT CHANGE UP (ref 3.5–5.3)
POTASSIUM SERPL-SCNC: 4.4 MMOL/L — SIGNIFICANT CHANGE UP (ref 3.5–5.3)
RBC # BLD: 4.09 M/UL — LOW (ref 4.6–6.2)
RBC # FLD: 16 % — HIGH (ref 11–15.6)
SODIUM SERPL-SCNC: 141 MMOL/L — SIGNIFICANT CHANGE UP (ref 135–145)
SPECIMEN SOURCE: SIGNIFICANT CHANGE UP
SPECIMEN SOURCE: SIGNIFICANT CHANGE UP
WBC # BLD: 8.4 K/UL — SIGNIFICANT CHANGE UP (ref 4.8–10.8)
WBC # FLD AUTO: 8.4 K/UL — SIGNIFICANT CHANGE UP (ref 4.8–10.8)

## 2017-11-06 PROCEDURE — 99291 CRITICAL CARE FIRST HOUR: CPT

## 2017-11-06 PROCEDURE — 93306 TTE W/DOPPLER COMPLETE: CPT | Mod: 26

## 2017-11-06 PROCEDURE — 95819 EEG AWAKE AND ASLEEP: CPT | Mod: 26

## 2017-11-06 RX ORDER — PIPERACILLIN AND TAZOBACTAM 4; .5 G/20ML; G/20ML
4.5 INJECTION, POWDER, LYOPHILIZED, FOR SOLUTION INTRAVENOUS EVERY 8 HOURS
Qty: 0 | Refills: 0 | Status: DISCONTINUED | OUTPATIENT
Start: 2017-11-06 | End: 2017-11-08

## 2017-11-06 RX ORDER — MAGNESIUM SULFATE 500 MG/ML
2 VIAL (ML) INJECTION ONCE
Qty: 0 | Refills: 0 | Status: COMPLETED | OUTPATIENT
Start: 2017-11-06 | End: 2017-11-06

## 2017-11-06 RX ORDER — FENTANYL CITRATE 50 UG/ML
25 INJECTION INTRAVENOUS ONCE
Qty: 0 | Refills: 0 | Status: DISCONTINUED | OUTPATIENT
Start: 2017-11-06 | End: 2017-11-06

## 2017-11-06 RX ADMIN — Medication 25 MILLIGRAM(S): at 07:10

## 2017-11-06 RX ADMIN — Medication 1 MILLIGRAM(S): at 11:49

## 2017-11-06 RX ADMIN — DEXMEDETOMIDINE HYDROCHLORIDE IN 0.9% SODIUM CHLORIDE 7.82 MICROGRAM(S)/KG/HR: 4 INJECTION INTRAVENOUS at 05:16

## 2017-11-06 RX ADMIN — Medication 20 MILLIGRAM(S): at 05:16

## 2017-11-06 RX ADMIN — PIPERACILLIN AND TAZOBACTAM 25 GRAM(S): 4; .5 INJECTION, POWDER, LYOPHILIZED, FOR SOLUTION INTRAVENOUS at 05:15

## 2017-11-06 RX ADMIN — Medication 1 TABLET(S): at 11:49

## 2017-11-06 RX ADMIN — Medication 1 PATCH: at 11:49

## 2017-11-06 RX ADMIN — ENOXAPARIN SODIUM 40 MILLIGRAM(S): 100 INJECTION SUBCUTANEOUS at 11:49

## 2017-11-06 RX ADMIN — DEXMEDETOMIDINE HYDROCHLORIDE IN 0.9% SODIUM CHLORIDE 7.82 MICROGRAM(S)/KG/HR: 4 INJECTION INTRAVENOUS at 21:50

## 2017-11-06 RX ADMIN — Medication 3 MILLILITER(S): at 20:39

## 2017-11-06 RX ADMIN — DEXMEDETOMIDINE HYDROCHLORIDE IN 0.9% SODIUM CHLORIDE 7.82 MICROGRAM(S)/KG/HR: 4 INJECTION INTRAVENOUS at 02:43

## 2017-11-06 RX ADMIN — Medication 25 MILLIGRAM(S): at 21:50

## 2017-11-06 RX ADMIN — ALBUTEROL 2.5 MILLIGRAM(S): 90 AEROSOL, METERED ORAL at 05:53

## 2017-11-06 RX ADMIN — Medication 3 MILLILITER(S): at 16:24

## 2017-11-06 RX ADMIN — LISINOPRIL 5 MILLIGRAM(S): 2.5 TABLET ORAL at 05:15

## 2017-11-06 RX ADMIN — PIPERACILLIN AND TAZOBACTAM 25 GRAM(S): 4; .5 INJECTION, POWDER, LYOPHILIZED, FOR SOLUTION INTRAVENOUS at 14:21

## 2017-11-06 RX ADMIN — DEXMEDETOMIDINE HYDROCHLORIDE IN 0.9% SODIUM CHLORIDE 7.82 MICROGRAM(S)/KG/HR: 4 INJECTION INTRAVENOUS at 12:06

## 2017-11-06 RX ADMIN — DEXMEDETOMIDINE HYDROCHLORIDE IN 0.9% SODIUM CHLORIDE 7.82 MICROGRAM(S)/KG/HR: 4 INJECTION INTRAVENOUS at 23:05

## 2017-11-06 RX ADMIN — Medication 25 MILLIGRAM(S): at 13:00

## 2017-11-06 RX ADMIN — FENTANYL CITRATE 25 MICROGRAM(S): 50 INJECTION INTRAVENOUS at 17:45

## 2017-11-06 RX ADMIN — FENTANYL CITRATE 25 MICROGRAM(S): 50 INJECTION INTRAVENOUS at 18:00

## 2017-11-06 RX ADMIN — PIPERACILLIN AND TAZOBACTAM 25 GRAM(S): 4; .5 INJECTION, POWDER, LYOPHILIZED, FOR SOLUTION INTRAVENOUS at 21:50

## 2017-11-06 RX ADMIN — CHLORHEXIDINE GLUCONATE 15 MILLILITER(S): 213 SOLUTION TOPICAL at 05:15

## 2017-11-06 RX ADMIN — PANTOPRAZOLE SODIUM 40 MILLIGRAM(S): 20 TABLET, DELAYED RELEASE ORAL at 11:49

## 2017-11-06 RX ADMIN — CHLORHEXIDINE GLUCONATE 15 MILLILITER(S): 213 SOLUTION TOPICAL at 17:03

## 2017-11-06 RX ADMIN — Medication 1 PATCH: at 12:22

## 2017-11-06 RX ADMIN — Medication 81 MILLIGRAM(S): at 11:49

## 2017-11-06 RX ADMIN — Medication 3 MILLILITER(S): at 03:15

## 2017-11-06 RX ADMIN — Medication 3 MILLILITER(S): at 08:24

## 2017-11-06 RX ADMIN — SENNA PLUS 2 TABLET(S): 8.6 TABLET ORAL at 21:50

## 2017-11-06 NOTE — PROGRESS NOTE ADULT - SUBJECTIVE AND OBJECTIVE BOX
Patient is a 62y old  Male who presents with a chief complaint of Shortness of breath (30 Oct 2017 15:03)      BRIEF HOSPITAL COURSE: 63 y/o M with a h/o COPD, a-fib (no a/c), CHF, alcoholic cirrhosis, EtOH abuse, EtOH withdrawal (intubated in past for airway protection), initially admitted on 10/28 for COPD exacerbation and alcohol intoxication, signed out AMA and was found later on in hospital disoriented. Readmitted and RRT called later on when patient became tremulous, agitated, delirious, and began hallucinating. Transferred to MICU for further management with benzos and precedex gtt. Patient became obtunded, lost ability to protect airway, and was subsequently intubated. As per report, patient is highly sensitive to benzodiazepines.  CT head neg for acute events. Course complicated by a-fib with RVR, sepsis, pseudomonas aspiration pneumonia. Self extubated on 11/2 without need for reintubation initially but on the morning of 11/4 patient obtunded  with difficulty managing secretions. Reintubated for airway protection.      Events last 24 hours: ***    PAST MEDICAL & SURGICAL HISTORY:  Falls  Meningitis  Collapsed lung  Alcohol withdrawal  Emphysema of lung  ETOH abuse  Cirrhosis  Afib  CHF (congestive heart failure)  Poor historian  Alcohol abuse  Chronic atrial fibrillation  S/P BKA (below knee amputation), left: secondary to worsening infection in lower leg. Had both ankle injuries from fall s/p repair - left had complication.  S/P BKA (below knee amputation) unilateral, left      Review of Systems:  CONSTITUTIONAL: No fever, chills, or fatigue  EYES: No eye pain, visual disturbances, or discharge  ENMT:  No difficulty hearing, tinnitus, vertigo; No sinus or throat pain  NECK: No pain or stiffness  RESPIRATORY: No cough, wheezing, chills or hemoptysis; No shortness of breath  CARDIOVASCULAR: No chest pain, palpitations, dizziness, or leg swelling  GASTROINTESTINAL: No abdominal or epigastric pain. No nausea, vomiting, or hematemesis; No diarrhea or constipation. No melena or hematochezia.  GENITOURINARY: No dysuria, frequency, hematuria, or incontinence  NEUROLOGICAL: No headaches, memory loss, loss of strength, numbness, or tremors  SKIN: No itching, burning, rashes, or lesions   MUSCULOSKELETAL: No joint pain or swelling; No muscle, back, or extremity pain  PSYCHIATRIC: No depression, anxiety, mood swings, or difficulty sleeping      Medications:  piperacillin/tazobactam IVPB. 3.375 Gram(s) IV Intermittent every 8 hours    furosemide    Tablet 20 milliGRAM(s) Oral daily  lisinopril 5 milliGRAM(s) Oral daily  metoprolol     tartrate 25 milliGRAM(s) Oral every 8 hours    ALBUTerol    0.083% 2.5 milliGRAM(s) Nebulizer every 4 hours PRN  ALBUTerol/ipratropium for Nebulization 3 milliLiter(s) Nebulizer every 6 hours    acetaminophen    Suspension 650 milliGRAM(s) Oral every 6 hours PRN  acetaminophen    Suspension 650 milliGRAM(s) Oral every 6 hours PRN  dexmedetomidine Infusion 0.3 MICROgram(s)/kG/Hr IV Continuous <Continuous>      aspirin  chewable 81 milliGRAM(s) Oral daily  enoxaparin Injectable 40 milliGRAM(s) SubCutaneous daily    pantoprazole  Injectable 40 milliGRAM(s) IV Push daily  senna 2 Tablet(s) Oral at bedtime        folic acid 1 milliGRAM(s) Oral daily  magnesium sulfate  IVPB 2 Gram(s) IV Intermittent once  multivitamin 1 Tablet(s) Oral daily    influenza   Vaccine 0.5 milliLiter(s) IntraMuscular once    chlorhexidine 0.12% Liquid 15 milliLiter(s) Swish and Spit two times a day    nicotine - 21 mG/24Hr(s) Patch 1 patch Transdermal daily      Mode: CPAP with PS  FiO2: 35  PEEP: 5  PS: 5  MAP: 8      ICU Vital Signs Last 24 Hrs  T(C): 37.5 (06 Nov 2017 08:00), Max: 37.8 (05 Nov 2017 10:00)  T(F): 99.5 (06 Nov 2017 08:00), Max: 100 (05 Nov 2017 10:00)  HR: 64 (06 Nov 2017 08:18) (52 - 104)  BP: 146/86 (06 Nov 2017 08:00) (115/57 - 178/79)  BP(mean): 111 (06 Nov 2017 08:00) (79 - 113)  ABP: --  ABP(mean): --  RR: 12 (06 Nov 2017 08:00) (12 - 29)  SpO2: 96% (06 Nov 2017 08:18) (95% - 100%)          I&O's Detail    05 Nov 2017 07:01  -  06 Nov 2017 07:00  --------------------------------------------------------  IN:    dexmedetomidine Infusion: 516.9 mL    Enteral Tube Flush: 280 mL    Jevity: 1440 mL    multiple electrolytes Injection Type 1multiple electrolytes Injection Type 1: 350 mL    Solution: 100 mL    Solution: 175 mL  Total IN: 2861.9 mL    OUT:    Indwelling Catheter - Urethral: 1525 mL  Total OUT: 1525 mL    Total NET: 1336.9 mL      06 Nov 2017 07:01  -  06 Nov 2017 08:30  --------------------------------------------------------  IN:    dexmedetomidine Infusion: 23.5 mL    Jevity: 60 mL  Total IN: 83.5 mL    OUT:    Indwelling Catheter - Urethral: 175 mL  Total OUT: 175 mL    Total NET: -91.5 mL            LABS:                        12.2   8.4   )-----------( 135      ( 06 Nov 2017 07:20 )             38.2     11-06    141  |  101  |  17.0  ----------------------------<  123<H>  4.4   |  32.0<H>  |  0.59    Ca    8.6      06 Nov 2017 07:20  Phos  3.4     11-06  Mg     2.0     11-06            CAPILLARY BLOOD GLUCOSE      CULTURES:  Culture Results:   Moderate Pseudomonas aeruginosa  Few Routine respiratory raymundo present (11-01-17 @ 15:10)  Culture Results:   No growth at 48 hours (11-01-17 @ 15:09)  Culture Results:   No growth at 48 hours (11-01-17 @ 15:08)      Physical Examination:    General: No acute distress.  Alert, oriented, interactive, nonfocal    HEENT: Pupils equal, reactive to light.  Symmetric.    PULM: Clear to auscultation bilaterally, no significant sputum production    CVS: Regular rate and rhythm, no murmurs, rubs, or gallops    ABD: Soft, nondistended, nontender, normoactive bowel sounds, no masses    EXT: No edema, nontender    SKIN: Warm and well perfused, no rashes noted.    RADIOLOGY: ***    CRITICAL CARE TIME SPENT: *** Patient is a 62y old  Male who presents with a chief complaint of Shortness of breath (30 Oct 2017 15:03)      BRIEF HOSPITAL COURSE: 61 y/o M with a h/o COPD, a-fib (no a/c), CHF, alcoholic cirrhosis, EtOH abuse, EtOH withdrawal (intubated in past for airway protection), initially admitted on 10/28 for COPD exacerbation and alcohol intoxication, signed out AMA and was found later on in hospital disoriented. Readmitted and RRT called later on when patient became tremulous, agitated, delirious, and began hallucinating. Transferred to MICU for further management with benzos and precedex gtt. Patient became obtunded, lost ability to protect airway, and was subsequently intubated. As per report, patient is highly sensitive to benzodiazepines.  CT head neg for acute events. Course complicated by a-fib with RVR, sepsis, pseudomonas aspiration pneumonia. Self extubated on 11/2 without need for reintubation initially but on the morning of 11/4 patient obtunded  with difficulty managing secretions. Reintubated for airway protection.      Events last 24 hours: still agitated once sedation lightened, talked with daughter Litzy at bedside about possible trach and peg     PAST MEDICAL & SURGICAL HISTORY:  Falls  Meningitis  Collapsed lung  Alcohol withdrawal  Emphysema of lung  ETOH abuse  Cirrhosis  Afib  CHF (congestive heart failure)  Poor historian  Alcohol abuse  Chronic atrial fibrillation  S/P BKA (below knee amputation), left: secondary to worsening infection in lower leg. Had both ankle injuries from fall s/p repair - left had complication.  S/P BKA (below knee amputation) unilateral, left      Review of Systems:  unable to obtain    Medications:  piperacillin/tazobactam IVPB. 3.375 Gram(s) IV Intermittent every 8 hours    furosemide    Tablet 20 milliGRAM(s) Oral daily  lisinopril 5 milliGRAM(s) Oral daily  metoprolol     tartrate 25 milliGRAM(s) Oral every 8 hours    ALBUTerol    0.083% 2.5 milliGRAM(s) Nebulizer every 4 hours PRN  ALBUTerol/ipratropium for Nebulization 3 milliLiter(s) Nebulizer every 6 hours    acetaminophen    Suspension 650 milliGRAM(s) Oral every 6 hours PRN  acetaminophen    Suspension 650 milliGRAM(s) Oral every 6 hours PRN  dexmedetomidine Infusion 0.3 MICROgram(s)/kG/Hr IV Continuous <Continuous>      aspirin  chewable 81 milliGRAM(s) Oral daily  enoxaparin Injectable 40 milliGRAM(s) SubCutaneous daily    pantoprazole  Injectable 40 milliGRAM(s) IV Push daily  senna 2 Tablet(s) Oral at bedtime        folic acid 1 milliGRAM(s) Oral daily  magnesium sulfate  IVPB 2 Gram(s) IV Intermittent once  multivitamin 1 Tablet(s) Oral daily    influenza   Vaccine 0.5 milliLiter(s) IntraMuscular once    chlorhexidine 0.12% Liquid 15 milliLiter(s) Swish and Spit two times a day    nicotine - 21 mG/24Hr(s) Patch 1 patch Transdermal daily      Mode: CPAP with PS  FiO2: 35  PEEP: 5  PS: 5  MAP: 8      ICU Vital Signs Last 24 Hrs  T(C): 37.5 (06 Nov 2017 08:00), Max: 37.8 (05 Nov 2017 10:00)  T(F): 99.5 (06 Nov 2017 08:00), Max: 100 (05 Nov 2017 10:00)  HR: 64 (06 Nov 2017 08:18) (52 - 104)  BP: 146/86 (06 Nov 2017 08:00) (115/57 - 178/79)  BP(mean): 111 (06 Nov 2017 08:00) (79 - 113)  ABP: --  ABP(mean): --  RR: 12 (06 Nov 2017 08:00) (12 - 29)  SpO2: 96% (06 Nov 2017 08:18) (95% - 100%)          I&O's Detail    05 Nov 2017 07:01  -  06 Nov 2017 07:00  --------------------------------------------------------  IN:    dexmedetomidine Infusion: 516.9 mL    Enteral Tube Flush: 280 mL    Jevity: 1440 mL    multiple electrolytes Injection Type 1multiple electrolytes Injection Type 1: 350 mL    Solution: 100 mL    Solution: 175 mL  Total IN: 2861.9 mL    OUT:    Indwelling Catheter - Urethral: 1525 mL  Total OUT: 1525 mL    Total NET: 1336.9 mL      06 Nov 2017 07:01  -  06 Nov 2017 08:30  --------------------------------------------------------  IN:    dexmedetomidine Infusion: 23.5 mL    Jevity: 60 mL  Total IN: 83.5 mL    OUT:    Indwelling Catheter - Urethral: 175 mL  Total OUT: 175 mL    Total NET: -91.5 mL            LABS:                        12.2   8.4   )-----------( 135      ( 06 Nov 2017 07:20 )             38.2     11-06    141  |  101  |  17.0  ----------------------------<  123<H>  4.4   |  32.0<H>  |  0.59    Ca    8.6      06 Nov 2017 07:20  Phos  3.4     11-06  Mg     2.0     11-06            CAPILLARY BLOOD GLUCOSE      CULTURES:  Culture Results:   Moderate Pseudomonas aeruginosa  Few Routine respiratory raymundo present (11-01-17 @ 15:10)  Culture Results:   No growth at 48 hours (11-01-17 @ 15:09)  Culture Results:   No growth at 48 hours (11-01-17 @ 15:08)      Physical Examination:    General: sedated not intubated    HEENT: Pupils equal, reactive to light.  Symmetric.    PULM: copious secretions    CVS: afib    ABD: Soft, nondistended, nontender, normoactive bowel sounds, no masses    EXT: No edema, nontender    SKIN: Warm and well perfused, no rashes noted.        CRITICAL CARE TIME SPENT: 50 mins

## 2017-11-06 NOTE — PROGRESS NOTE ADULT - PROBLEM SELECTOR PLAN 2
ETOH delerium likely passed, likely now ICU delerium, precedex titrate to RASS 0 - -1, gets oversedated with sedatives

## 2017-11-06 NOTE — PROGRESS NOTE ADULT - PROBLEM SELECTOR PLAN 1
failed SBT this am, likely with some tracheal stenosis, discussed trach and peg with one daughter will likely needed this hospital stay

## 2017-11-06 NOTE — CHART NOTE - NSCHARTNOTEFT_GEN_A_CORE
stand by while attmpting SBT trial pt wheezing and given stat tx  sbt trial continues see vent flow sheet

## 2017-11-06 NOTE — PROGRESS NOTE ADULT - ASSESSMENT
62 yom with multiple medicals problems ETOH, COPD, afib, cirrhosis with ETOH withdrawal, PSAR PNA, encephalopathy delerium, acute respiratory failure    updated daughter at bedside

## 2017-11-07 LAB
ANION GAP SERPL CALC-SCNC: 11 MMOL/L — SIGNIFICANT CHANGE UP (ref 5–17)
BUN SERPL-MCNC: 19 MG/DL — SIGNIFICANT CHANGE UP (ref 8–20)
CALCIUM SERPL-MCNC: 8.8 MG/DL — SIGNIFICANT CHANGE UP (ref 8.6–10.2)
CHLORIDE SERPL-SCNC: 99 MMOL/L — SIGNIFICANT CHANGE UP (ref 98–107)
CO2 SERPL-SCNC: 31 MMOL/L — HIGH (ref 22–29)
CREAT SERPL-MCNC: 0.62 MG/DL — SIGNIFICANT CHANGE UP (ref 0.5–1.3)
GLUCOSE SERPL-MCNC: 112 MG/DL — SIGNIFICANT CHANGE UP (ref 70–115)
HCT VFR BLD CALC: 37.2 % — LOW (ref 42–52)
HGB BLD-MCNC: 12.1 G/DL — LOW (ref 14–18)
MAGNESIUM SERPL-MCNC: 1.9 MG/DL — SIGNIFICANT CHANGE UP (ref 1.6–2.6)
MCHC RBC-ENTMCNC: 30.1 PG — SIGNIFICANT CHANGE UP (ref 27–31)
MCHC RBC-ENTMCNC: 32.5 G/DL — SIGNIFICANT CHANGE UP (ref 32–36)
MCV RBC AUTO: 92.5 FL — SIGNIFICANT CHANGE UP (ref 80–94)
PHOSPHATE SERPL-MCNC: 4.2 MG/DL — SIGNIFICANT CHANGE UP (ref 2.4–4.7)
PLATELET # BLD AUTO: 156 K/UL — SIGNIFICANT CHANGE UP (ref 150–400)
POTASSIUM SERPL-MCNC: 4.8 MMOL/L — SIGNIFICANT CHANGE UP (ref 3.5–5.3)
POTASSIUM SERPL-SCNC: 4.8 MMOL/L — SIGNIFICANT CHANGE UP (ref 3.5–5.3)
RBC # BLD: 4.02 M/UL — LOW (ref 4.6–6.2)
RBC # FLD: 16.2 % — HIGH (ref 11–15.6)
SODIUM SERPL-SCNC: 141 MMOL/L — SIGNIFICANT CHANGE UP (ref 135–145)
WBC # BLD: 10 K/UL — SIGNIFICANT CHANGE UP (ref 4.8–10.8)
WBC # FLD AUTO: 10 K/UL — SIGNIFICANT CHANGE UP (ref 4.8–10.8)

## 2017-11-07 PROCEDURE — 74000: CPT | Mod: 26

## 2017-11-07 PROCEDURE — 31500 INSERT EMERGENCY AIRWAY: CPT

## 2017-11-07 PROCEDURE — 71010: CPT | Mod: 26

## 2017-11-07 PROCEDURE — 70551 MRI BRAIN STEM W/O DYE: CPT | Mod: 26

## 2017-11-07 PROCEDURE — 99291 CRITICAL CARE FIRST HOUR: CPT | Mod: 25

## 2017-11-07 RX ORDER — ONDANSETRON 8 MG/1
8 TABLET, FILM COATED ORAL EVERY 6 HOURS
Qty: 0 | Refills: 0 | Status: DISCONTINUED | OUTPATIENT
Start: 2017-11-07 | End: 2017-11-07

## 2017-11-07 RX ORDER — RISPERIDONE 4 MG/1
0.25 TABLET ORAL AT BEDTIME
Qty: 0 | Refills: 0 | Status: DISCONTINUED | OUTPATIENT
Start: 2017-11-07 | End: 2017-11-09

## 2017-11-07 RX ORDER — FENTANYL CITRATE 50 UG/ML
50 INJECTION INTRAVENOUS ONCE
Qty: 0 | Refills: 0 | Status: DISCONTINUED | OUTPATIENT
Start: 2017-11-07 | End: 2017-11-07

## 2017-11-07 RX ORDER — ONDANSETRON 8 MG/1
8 TABLET, FILM COATED ORAL EVERY 6 HOURS
Qty: 0 | Refills: 0 | Status: DISCONTINUED | OUTPATIENT
Start: 2017-11-07 | End: 2017-11-21

## 2017-11-07 RX ORDER — POLYETHYLENE GLYCOL 3350 17 G/17G
17 POWDER, FOR SOLUTION ORAL DAILY
Qty: 0 | Refills: 0 | Status: DISCONTINUED | OUTPATIENT
Start: 2017-11-07 | End: 2017-11-09

## 2017-11-07 RX ORDER — LACTULOSE 10 G/15ML
10 SOLUTION ORAL EVERY 12 HOURS
Qty: 0 | Refills: 0 | Status: DISCONTINUED | OUTPATIENT
Start: 2017-11-07 | End: 2017-11-14

## 2017-11-07 RX ADMIN — Medication 81 MILLIGRAM(S): at 11:25

## 2017-11-07 RX ADMIN — FENTANYL CITRATE 50 MICROGRAM(S): 50 INJECTION INTRAVENOUS at 23:50

## 2017-11-07 RX ADMIN — DEXMEDETOMIDINE HYDROCHLORIDE IN 0.9% SODIUM CHLORIDE 7.82 MICROGRAM(S)/KG/HR: 4 INJECTION INTRAVENOUS at 05:46

## 2017-11-07 RX ADMIN — PIPERACILLIN AND TAZOBACTAM 25 GRAM(S): 4; .5 INJECTION, POWDER, LYOPHILIZED, FOR SOLUTION INTRAVENOUS at 22:02

## 2017-11-07 RX ADMIN — ENOXAPARIN SODIUM 40 MILLIGRAM(S): 100 INJECTION SUBCUTANEOUS at 11:25

## 2017-11-07 RX ADMIN — Medication 3 MILLILITER(S): at 03:39

## 2017-11-07 RX ADMIN — DEXMEDETOMIDINE HYDROCHLORIDE IN 0.9% SODIUM CHLORIDE 7.82 MICROGRAM(S)/KG/HR: 4 INJECTION INTRAVENOUS at 22:57

## 2017-11-07 RX ADMIN — DEXMEDETOMIDINE HYDROCHLORIDE IN 0.9% SODIUM CHLORIDE 7.82 MICROGRAM(S)/KG/HR: 4 INJECTION INTRAVENOUS at 02:16

## 2017-11-07 RX ADMIN — DEXMEDETOMIDINE HYDROCHLORIDE IN 0.9% SODIUM CHLORIDE 7.82 MICROGRAM(S)/KG/HR: 4 INJECTION INTRAVENOUS at 20:51

## 2017-11-07 RX ADMIN — DEXMEDETOMIDINE HYDROCHLORIDE IN 0.9% SODIUM CHLORIDE 7.82 MICROGRAM(S)/KG/HR: 4 INJECTION INTRAVENOUS at 14:02

## 2017-11-07 RX ADMIN — FENTANYL CITRATE 50 MICROGRAM(S): 50 INJECTION INTRAVENOUS at 23:55

## 2017-11-07 RX ADMIN — CHLORHEXIDINE GLUCONATE 15 MILLILITER(S): 213 SOLUTION TOPICAL at 05:45

## 2017-11-07 RX ADMIN — FENTANYL CITRATE 100 MICROGRAM(S): 50 INJECTION INTRAVENOUS at 23:45

## 2017-11-07 RX ADMIN — DEXMEDETOMIDINE HYDROCHLORIDE IN 0.9% SODIUM CHLORIDE 7.82 MICROGRAM(S)/KG/HR: 4 INJECTION INTRAVENOUS at 06:41

## 2017-11-07 RX ADMIN — Medication 3 MILLILITER(S): at 08:28

## 2017-11-07 RX ADMIN — Medication 1 MILLIGRAM(S): at 11:25

## 2017-11-07 RX ADMIN — PANTOPRAZOLE SODIUM 40 MILLIGRAM(S): 20 TABLET, DELAYED RELEASE ORAL at 11:26

## 2017-11-07 RX ADMIN — Medication 1 TABLET(S): at 11:25

## 2017-11-07 RX ADMIN — POLYETHYLENE GLYCOL 3350 17 GRAM(S): 17 POWDER, FOR SOLUTION ORAL at 11:51

## 2017-11-07 RX ADMIN — Medication 1 PATCH: at 11:26

## 2017-11-07 RX ADMIN — CHLORHEXIDINE GLUCONATE 15 MILLILITER(S): 213 SOLUTION TOPICAL at 18:53

## 2017-11-07 RX ADMIN — PIPERACILLIN AND TAZOBACTAM 25 GRAM(S): 4; .5 INJECTION, POWDER, LYOPHILIZED, FOR SOLUTION INTRAVENOUS at 06:41

## 2017-11-07 RX ADMIN — FENTANYL CITRATE 50 MICROGRAM(S): 50 INJECTION INTRAVENOUS at 15:30

## 2017-11-07 RX ADMIN — RISPERIDONE 0.25 MILLIGRAM(S): 4 TABLET ORAL at 22:02

## 2017-11-07 RX ADMIN — Medication 3 MILLILITER(S): at 20:13

## 2017-11-07 RX ADMIN — Medication 20 MILLIGRAM(S): at 05:46

## 2017-11-07 RX ADMIN — FENTANYL CITRATE 50 MICROGRAM(S): 50 INJECTION INTRAVENOUS at 15:15

## 2017-11-07 RX ADMIN — ONDANSETRON 8 MILLIGRAM(S): 8 TABLET, FILM COATED ORAL at 08:00

## 2017-11-07 RX ADMIN — PIPERACILLIN AND TAZOBACTAM 25 GRAM(S): 4; .5 INJECTION, POWDER, LYOPHILIZED, FOR SOLUTION INTRAVENOUS at 14:02

## 2017-11-07 RX ADMIN — LACTULOSE 10 GRAM(S): 10 SOLUTION ORAL at 18:53

## 2017-11-07 RX ADMIN — FENTANYL CITRATE 50 MICROGRAM(S): 50 INJECTION INTRAVENOUS at 14:02

## 2017-11-07 RX ADMIN — FENTANYL CITRATE 50 MICROGRAM(S): 50 INJECTION INTRAVENOUS at 13:50

## 2017-11-07 RX ADMIN — LISINOPRIL 5 MILLIGRAM(S): 2.5 TABLET ORAL at 05:45

## 2017-11-07 RX ADMIN — Medication 25 MILLIGRAM(S): at 05:46

## 2017-11-07 RX ADMIN — SENNA PLUS 2 TABLET(S): 8.6 TABLET ORAL at 22:03

## 2017-11-07 NOTE — PROCEDURE NOTE - NSPROCDETAILS_GEN_ALL_CORE
connected to ventilator/patient pre-oxygenated, tube inserted, placement confirmed
patient pre-oxygenated, tube inserted, placement confirmed/connected to ventilator
patient pre-oxygenated, tube inserted, placement confirmed/connected to ventilator
connected to ventilator/patient pre-oxygenated, tube inserted, placement confirmed
orogastric
orogastric

## 2017-11-07 NOTE — PROGRESS NOTE ADULT - SUBJECTIVE AND OBJECTIVE BOX
This is a 62 year old male with history of COPD, a-fib, CHF, alcohol abuse, alcoholic cirrhosis, multiple admissions for withdrawal and intubated in past for airway protection who was initially admitted for COPD exacerbation and alcohol intoxication, signed out against medical advise but Readmitted . He was then a rapid response while on the floor for withdrawal with delirium / hallucinating. Transferred to MICU for further management with benzos and precedex gtt but became obtunded and was intubated for airway protection. His hospital course complicated by Rapid a-fib, sepsis, aspiration pneumonia. He has Self extubated twice including this morning with reintubation.     Kathia I was called to bedside for hypoxia and pt not receiving tidal volumes on the ventilator. It was evident that the cuff on the ET tube was damaged due to the presence of air  leak heard in the oral pharynx and inability to poorly re-inflate the cuff. I took a quick look with the glidescope and visualized the tube going between the vocal cords confirming the tube was in place. The pt was getting tidal volumes in the low 100's and dropping 02 sats to low 80s so he was bagged quickly increased to high 90's maintained and the ET tube was changed over a bougie successfully secured in place with good bilateral lung sounds, no epigastric sounds and color change on co2 apparatus. Will obtain chest xray to confirm proper placement above the regina. This is a 62 year old male with history of COPD, a-fib, CHF, alcohol abuse, alcoholic cirrhosis, multiple admissions for withdrawal and intubated in past for airway protection who was initially admitted for COPD exacerbation and alcohol intoxication, signed out against medical advise but Readmitted . He was then a rapid response while on the floor for withdrawal with delirium / hallucinating. Transferred to MICU for further management with benzos and precedex gtt but became obtunded and was intubated for airway protection. His hospital course complicated by Rapid a-fib, sepsis, aspiration pneumonia. He has Self extubated twice including this morning with reintubation.     Kathia I was called to bedside for hypoxia and pt not receiving tidal volumes on the ventilator. It was evident that the cuff on the ET tube was damaged due to the presence of air  leak heard in the oral pharynx and inability to poorly re-inflate the cuff. I took a quick look with the glidescope and visualized the tube going between the vocal cords confirming the tube was in place. The pt was loosing  tidal volumes getting in the low 100's and dropping 02 sats to low 80s so he was bagged quickly increased to high 90's maintained and the ET tube was changed over a bougie successfully secured in place with good bilateral lung sounds, no epigastric sounds and color change on co2 apparatus. Will obtain chest xray to confirm proper placement above the regina.     Vital Signs Last 24 Hrs  T(C): 37.5 (08 Nov 2017 00:00), Max: 38 (07 Nov 2017 08:00)  T(F): 99.5 (08 Nov 2017 00:00), Max: 100.4 (07 Nov 2017 08:00)  HR: 63 (08 Nov 2017 02:00) (52 - 83)  BP: 125/68 (08 Nov 2017 02:00) (103/56 - 138/75)  BP(mean): 92 (08 Nov 2017 02:00) (76 - 103)  RR: 15 (08 Nov 2017 02:00) (4 - 24)  SpO2: 96% (08 Nov 2017 02:00) (91% - 100%)      Critical Care time: 35 mins assessing presenting problems of acute illness that poses high probability of life threatening deterioration or end organ damage/dysfunction.  Medical decision making inculding Initiating plan of care, reviewing data, reviewing radiology, discussing with multidisciplinary team, non inclusive of procedures, discussing goals of care with patient/family     Plan  Acute hypoxic respiratory failure  -Patient currently on Full vent support  -titrate settings to maintain SaO2 >90%, or pH >7.25  -consider low titdal volume ventilation strategy w/ goal Tv 4-6 cc/kg of ideal body weight  -plateu pressure goal <30  -Peridex oral care and VAP prophylaxis with HOB 30 degrees   -daily sedation vacation with spontaneous breathing trial if clinical condition warrants, discuss with respiratory therapy     Aspiration Pneumonia   -continue antibiotics  -likely will need trach     alcohol withdrwal  - post withdrawl treatment  - conituning sedation while intubated     prophylaxis measures  -protonix for GI  - lovenox for DVT  - head of bed at 30 degrees and peridex  for VAP This is a 62 year old male with history of COPD, a-fib, CHF, alcohol abuse, alcoholic cirrhosis, multiple admissions for withdrawal and intubated in past for airway protection who was initially admitted for COPD exacerbation and alcohol intoxication, signed out against medical advise but Readmitted . He was then a rapid response while on the floor for withdrawal with delirium / hallucinating. Transferred to MICU for further management with benzos and precedex gtt but became obtunded and was intubated for airway protection. His hospital course complicated by Rapid a-fib, sepsis, aspiration pneumonia. He has Self extubated twice including this morning with reintubation.     Kathia I was called to bedside for hypoxia and pt not receiving tidal volumes on the ventilator. It was evident that the cuff on the ET tube was damaged due to the presence of air  leak heard in the oral pharynx and inability to poorly re-inflate the cuff. I took a quick look with the glidescope and visualized the tube going between the vocal cords confirming the tube was in place. The pt was loosing  tidal volumes getting in the low 100's and dropping 02 sats to low 80s so he was bagged quickly increased to high 90's maintained and the ET tube was changed over a bougie successfully secured in place with good bilateral lung sounds, no epigastric sounds and color change on co2 apparatus. Will obtain chest xray to confirm proper placement above the regina.     Vital Signs Last 24 Hrs  T(C): 37.5 (08 Nov 2017 00:00), Max: 38 (07 Nov 2017 08:00)  T(F): 99.5 (08 Nov 2017 00:00), Max: 100.4 (07 Nov 2017 08:00)  HR: 63 (08 Nov 2017 02:00) (52 - 83)  BP: 125/68 (08 Nov 2017 02:00) (103/56 - 138/75)  BP(mean): 92 (08 Nov 2017 02:00) (76 - 103)  RR: 15 (08 Nov 2017 02:00) (4 - 24)  SpO2: 96% (08 Nov 2017 02:00) (91% - 100%)      Critical Care time: 40 mins assessing presenting problems of acute illness that poses high probability of life threatening deterioration or end organ damage/dysfunction.  Medical decision making inculding Initiating plan of care, reviewing data, reviewing radiology, discussing with multidisciplinary team, non inclusive of procedures, discussing goals of care with patient/family     Plan  Acute hypoxic respiratory failure  -Patient currently on Full vent support  -titrate settings to maintain SaO2 >90%, or pH >7.25  -consider low titdal volume ventilation strategy w/ goal Tv 4-6 cc/kg of ideal body weight  -plateu pressure goal <30  -Peridex oral care and VAP prophylaxis with HOB 30 degrees   -daily sedation vacation with spontaneous breathing trial if clinical condition warrants, discuss with respiratory therapy     Aspiration Pneumonia   -continue antibiotics  -likely will need trach     alcohol withdrwal  - post withdrawl treatment  - conituning sedation while intubated     prophylaxis measures  -protonix for GI  - lovenox for DVT  - head of bed at 30 degrees and peridex  for VAP

## 2017-11-07 NOTE — PROGRESS NOTE ADULT - SUBJECTIVE AND OBJECTIVE BOX
Patient is a 62y old  Male who presents with a chief complaint of Shortness of breath (30 Oct 2017 15:03)      BRIEF HOSPITAL COURSE: 63 y/o M with a h/o COPD, a-fib (no a/c), CHF, alcoholic cirrhosis, EtOH abuse, EtOH withdrawal (intubated in past for airway protection), initially admitted on 10/28 for COPD exacerbation and alcohol intoxication, signed out AMA and was found later on in hospital disoriented. Readmitted and RRT called later on when patient became tremulous, agitated, delirious, and began hallucinating. Transferred to MICU for further management with benzos and precedex gtt. Patient became obtunded, lost ability to protect airway, and was subsequently intubated. As per report, patient is highly sensitive to benzodiazepines.  CT head neg for acute events. Course complicated by a-fib with RVR, sepsis, pseudomonas aspiration pneumonia. Self extubated on 11/2 without need for reintubation initially but on the morning of 11/4 patient obtunded  with difficulty managing secretions. Reintubated for airway protection.    Events last 24 hours: coughed out ET tube this am and bite through og tube, still agitated with decrease in sedation    PAST MEDICAL & SURGICAL HISTORY:  Falls  Meningitis  Collapsed lung  Alcohol withdrawal  Emphysema of lung  ETOH abuse  Cirrhosis  Afib  CHF (congestive heart failure)  Poor historian  Alcohol abuse  Chronic atrial fibrillation  S/P BKA (below knee amputation), left: secondary to worsening infection in lower leg. Had both ankle injuries from fall s/p repair - left had complication.  S/P BKA (below knee amputation) unilateral, left      Review of Systems:  unable to obtain      Medications:  piperacillin/tazobactam IVPB. 4.5 Gram(s) IV Intermittent every 8 hours    furosemide    Tablet 20 milliGRAM(s) Oral daily  lisinopril 5 milliGRAM(s) Oral daily  metoprolol     tartrate 25 milliGRAM(s) Oral every 8 hours    ALBUTerol    0.083% 2.5 milliGRAM(s) Nebulizer every 4 hours PRN  ALBUTerol/ipratropium for Nebulization 3 milliLiter(s) Nebulizer every 6 hours    acetaminophen    Suspension 650 milliGRAM(s) Oral every 6 hours PRN  acetaminophen    Suspension 650 milliGRAM(s) Oral every 6 hours PRN  dexmedetomidine Infusion 0.3 MICROgram(s)/kG/Hr IV Continuous <Continuous>  ondansetron Injectable 8 milliGRAM(s) IV Push every 6 hours PRN  risperiDONE   Tablet 0.25 milliGRAM(s) Oral at bedtime      aspirin  chewable 81 milliGRAM(s) Oral daily  enoxaparin Injectable 40 milliGRAM(s) SubCutaneous daily    lactulose Syrup 10 Gram(s) Oral every 12 hours  pantoprazole  Injectable 40 milliGRAM(s) IV Push daily  polyethylene glycol 3350 17 Gram(s) Oral daily  senna 2 Tablet(s) Oral at bedtime        folic acid 1 milliGRAM(s) Oral daily  multivitamin 1 Tablet(s) Oral daily    influenza   Vaccine 0.5 milliLiter(s) IntraMuscular once    chlorhexidine 0.12% Liquid 15 milliLiter(s) Swish and Spit two times a day    nicotine - 21 mG/24Hr(s) Patch 1 patch Transdermal daily      Mode: AC/ CMV (Assist Control/ Continuous Mandatory Ventilation)  RR (machine): 12  TV (machine): 480  FiO2: 30  PEEP: 5  MAP: 10  PIP: 24      ICU Vital Signs Last 24 Hrs  T(C): 37.2 (07 Nov 2017 20:00), Max: 38 (07 Nov 2017 08:00)  T(F): 99 (07 Nov 2017 20:00), Max: 100.4 (07 Nov 2017 08:00)  HR: 52 (07 Nov 2017 20:08) (52 - 83)  BP: 125/67 (07 Nov 2017 20:00) (103/56 - 152/78)  BP(mean): 91 (07 Nov 2017 20:00) (76 - 105)  ABP: --  ABP(mean): --  RR: 21 (07 Nov 2017 20:00) (14 - 24)  SpO2: 97% (07 Nov 2017 20:14) (94% - 99%)          I&O's Detail    06 Nov 2017 07:01  -  07 Nov 2017 07:00  --------------------------------------------------------  IN:    dexmedetomidine Infusion: 613.3 mL    Enteral Tube Flush: 220 mL    Jevity: 1380 mL    Solution: 225 mL  Total IN: 2438.3 mL    OUT:    Indwelling Catheter - Urethral: 2360 mL  Total OUT: 2360 mL    Total NET: 78.3 mL      07 Nov 2017 07:01  -  07 Nov 2017 22:09  --------------------------------------------------------  IN:    dexmedetomidine Infusion: 313.2 mL    Enteral Tube Flush: 220 mL    Solution: 175 mL  Total IN: 708.2 mL    OUT:    Indwelling Catheter - Urethral: 780 mL  Total OUT: 780 mL    Total NET: -71.8 mL            LABS:                        12.1   10.0  )-----------( 156      ( 07 Nov 2017 05:09 )             37.2     11-07    141  |  99  |  19.0  ----------------------------<  112  4.8   |  31.0<H>  |  0.62    Ca    8.8      07 Nov 2017 05:09  Phos  4.2     11-07  Mg     1.9     11-07            CAPILLARY BLOOD GLUCOSE            CULTURES:  Culture Results:   Moderate Pseudomonas aeruginosa  Few Routine respiratory raymundo present (11-01-17 @ 15:10)  Culture Results:   No growth at 5 days. (11-01-17 @ 15:09)  Culture Results:   No growth at 5 days. (11-01-17 @ 15:08)      Physical Examination:    General: intubated sedated    HEENT: Pupils equal, reactive to light.  Symmetric.    PULM: Course    CVS: Regular rate and rhythm, no murmurs, rubs, or gallops    ABD: mild distension, + BS    EXT: edema, nontender    SKIN: Warm and well perfused, no rashes noted.    RADIOLOGY: < from: Xray Chest 1 View AP/PA. (11.07.17 @ 09:10) >  EXAM:  CHEST SINGLE VIEW FRONTAL                          PROCEDURE DATE:  11/07/2017          INTERPRETATION:  History: Vomiting. Abdominal pain. Intubated    Technique:  AP portable    Comparisons:  Chest x-ray dated 11/4/2017    Findings: ET tube in good position at the level of the clavicles.   Nasogastric tube in stomach. The lungs are clear. There are no   infiltrates, congestion or pleural effusions. The pulmonary vasculature   and aorta are normal for age. Moderate cardiomegaly unchanged.   Nasogastric tube in stomach. The thorax is normal for age.    Impression: No acute pulmonary disease. Cardiomegaly.          CRITICAL CARE TIME SPENT: ***

## 2017-11-07 NOTE — PROCEDURE NOTE - NSTRACHINTUBMED_RESP_A_CORE
propofol injectable/etomidate injectable
etomidate injectable/propofol injectable
200mg/propofol injectable
fentaNYL injectable/25mcg

## 2017-11-07 NOTE — PROCEDURE NOTE - NSTRACHPOSTINTU_RESP_A_CORE
Positive end tidal Co2 noted/Appropriate capnography/Breath sounds bilateral/Breath sounds equal/Chest X-Ray
Appropriate capnography/Breath sounds bilateral/Breath sounds equal/Chest excursion noted/Chest X-Ray
Appropriate capnography/Breath sounds bilateral/Breath sounds equal/Chest X-Ray/Chest excursion noted

## 2017-11-07 NOTE — PROGRESS NOTE ADULT - ASSESSMENT
62 yom with ETOH withdrawal, PSAR PNA, COPD, afib, cirrhosis acute respiratory failure acute encephalopathy    Long dicussion with daughter papo yesterday and today, called and left message with Adela discussed option of trach and peg which would be his second, they need to discuss with if this is what he would want

## 2017-11-08 LAB
ANION GAP SERPL CALC-SCNC: 11 MMOL/L — SIGNIFICANT CHANGE UP (ref 5–17)
BUN SERPL-MCNC: 18 MG/DL — SIGNIFICANT CHANGE UP (ref 8–20)
CALCIUM SERPL-MCNC: 8.9 MG/DL — SIGNIFICANT CHANGE UP (ref 8.6–10.2)
CHLORIDE SERPL-SCNC: 99 MMOL/L — SIGNIFICANT CHANGE UP (ref 98–107)
CO2 SERPL-SCNC: 30 MMOL/L — HIGH (ref 22–29)
CREAT SERPL-MCNC: 0.68 MG/DL — SIGNIFICANT CHANGE UP (ref 0.5–1.3)
GLUCOSE SERPL-MCNC: 110 MG/DL — SIGNIFICANT CHANGE UP (ref 70–115)
GRAM STN FLD: SIGNIFICANT CHANGE UP
HCT VFR BLD CALC: 38.2 % — LOW (ref 42–52)
HGB BLD-MCNC: 12.4 G/DL — LOW (ref 14–18)
MAGNESIUM SERPL-MCNC: 1.9 MG/DL — SIGNIFICANT CHANGE UP (ref 1.6–2.6)
MCHC RBC-ENTMCNC: 30.2 PG — SIGNIFICANT CHANGE UP (ref 27–31)
MCHC RBC-ENTMCNC: 32.5 G/DL — SIGNIFICANT CHANGE UP (ref 32–36)
MCV RBC AUTO: 92.9 FL — SIGNIFICANT CHANGE UP (ref 80–94)
PHOSPHATE SERPL-MCNC: 4.1 MG/DL — SIGNIFICANT CHANGE UP (ref 2.4–4.7)
PLATELET # BLD AUTO: 171 K/UL — SIGNIFICANT CHANGE UP (ref 150–400)
POTASSIUM SERPL-MCNC: 4.5 MMOL/L — SIGNIFICANT CHANGE UP (ref 3.5–5.3)
POTASSIUM SERPL-SCNC: 4.5 MMOL/L — SIGNIFICANT CHANGE UP (ref 3.5–5.3)
RBC # BLD: 4.11 M/UL — LOW (ref 4.6–6.2)
RBC # FLD: 16.2 % — HIGH (ref 11–15.6)
SODIUM SERPL-SCNC: 140 MMOL/L — SIGNIFICANT CHANGE UP (ref 135–145)
SPECIMEN SOURCE: SIGNIFICANT CHANGE UP
WBC # BLD: 8 K/UL — SIGNIFICANT CHANGE UP (ref 4.8–10.8)
WBC # FLD AUTO: 8 K/UL — SIGNIFICANT CHANGE UP (ref 4.8–10.8)

## 2017-11-08 PROCEDURE — 71010: CPT | Mod: 26,76

## 2017-11-08 PROCEDURE — 71010: CPT | Mod: 26,77

## 2017-11-08 PROCEDURE — 99291 CRITICAL CARE FIRST HOUR: CPT

## 2017-11-08 RX ORDER — AZTREONAM 2 G
2000 VIAL (EA) INJECTION EVERY 8 HOURS
Qty: 0 | Refills: 0 | Status: COMPLETED | OUTPATIENT
Start: 2017-11-08 | End: 2017-11-10

## 2017-11-08 RX ORDER — FENTANYL CITRATE 50 UG/ML
50 INJECTION INTRAVENOUS ONCE
Qty: 0 | Refills: 0 | Status: DISCONTINUED | OUTPATIENT
Start: 2017-11-07 | End: 2017-11-07

## 2017-11-08 RX ORDER — PROPOFOL 10 MG/ML
20 INJECTION, EMULSION INTRAVENOUS
Qty: 1000 | Refills: 0 | Status: DISCONTINUED | OUTPATIENT
Start: 2017-11-08 | End: 2017-11-11

## 2017-11-08 RX ORDER — PROPOFOL 10 MG/ML
10 INJECTION, EMULSION INTRAVENOUS
Qty: 1000 | Refills: 0 | Status: DISCONTINUED | OUTPATIENT
Start: 2017-11-08 | End: 2017-11-09

## 2017-11-08 RX ORDER — FENTANYL CITRATE 50 UG/ML
100 INJECTION INTRAVENOUS ONCE
Qty: 0 | Refills: 0 | Status: DISCONTINUED | OUTPATIENT
Start: 2017-11-07 | End: 2017-11-07

## 2017-11-08 RX ORDER — FENTANYL CITRATE 50 UG/ML
100 INJECTION INTRAVENOUS ONCE
Qty: 0 | Refills: 0 | Status: DISCONTINUED | OUTPATIENT
Start: 2017-11-08 | End: 2017-11-08

## 2017-11-08 RX ADMIN — Medication 3 MILLILITER(S): at 03:55

## 2017-11-08 RX ADMIN — RISPERIDONE 0.25 MILLIGRAM(S): 4 TABLET ORAL at 21:25

## 2017-11-08 RX ADMIN — PROPOFOL 12.52 MICROGRAM(S)/KG/MIN: 10 INJECTION, EMULSION INTRAVENOUS at 18:18

## 2017-11-08 RX ADMIN — POLYETHYLENE GLYCOL 3350 17 GRAM(S): 17 POWDER, FOR SOLUTION ORAL at 11:39

## 2017-11-08 RX ADMIN — ENOXAPARIN SODIUM 40 MILLIGRAM(S): 100 INJECTION SUBCUTANEOUS at 11:38

## 2017-11-08 RX ADMIN — CHLORHEXIDINE GLUCONATE 15 MILLILITER(S): 213 SOLUTION TOPICAL at 18:18

## 2017-11-08 RX ADMIN — Medication 20 MILLIGRAM(S): at 05:17

## 2017-11-08 RX ADMIN — Medication 10 MILLIGRAM(S): at 11:35

## 2017-11-08 RX ADMIN — LISINOPRIL 5 MILLIGRAM(S): 2.5 TABLET ORAL at 05:17

## 2017-11-08 RX ADMIN — Medication 1 TABLET(S): at 11:38

## 2017-11-08 RX ADMIN — DEXMEDETOMIDINE HYDROCHLORIDE IN 0.9% SODIUM CHLORIDE 7.82 MICROGRAM(S)/KG/HR: 4 INJECTION INTRAVENOUS at 02:22

## 2017-11-08 RX ADMIN — CHLORHEXIDINE GLUCONATE 15 MILLILITER(S): 213 SOLUTION TOPICAL at 05:17

## 2017-11-08 RX ADMIN — Medication 100 MILLIGRAM(S): at 21:24

## 2017-11-08 RX ADMIN — PANTOPRAZOLE SODIUM 40 MILLIGRAM(S): 20 TABLET, DELAYED RELEASE ORAL at 11:39

## 2017-11-08 RX ADMIN — SENNA PLUS 2 TABLET(S): 8.6 TABLET ORAL at 21:24

## 2017-11-08 RX ADMIN — Medication 1 PATCH: at 11:39

## 2017-11-08 RX ADMIN — Medication 81 MILLIGRAM(S): at 11:38

## 2017-11-08 RX ADMIN — FENTANYL CITRATE 100 MICROGRAM(S): 50 INJECTION INTRAVENOUS at 22:15

## 2017-11-08 RX ADMIN — Medication 1 PATCH: at 11:38

## 2017-11-08 RX ADMIN — Medication 3 MILLILITER(S): at 20:46

## 2017-11-08 RX ADMIN — Medication 25 MILLIGRAM(S): at 05:17

## 2017-11-08 RX ADMIN — DEXMEDETOMIDINE HYDROCHLORIDE IN 0.9% SODIUM CHLORIDE 7.82 MICROGRAM(S)/KG/HR: 4 INJECTION INTRAVENOUS at 04:32

## 2017-11-08 RX ADMIN — Medication 3 MILLILITER(S): at 15:16

## 2017-11-08 RX ADMIN — Medication 3 MILLILITER(S): at 08:28

## 2017-11-08 RX ADMIN — Medication 25 MILLIGRAM(S): at 21:24

## 2017-11-08 RX ADMIN — DEXMEDETOMIDINE HYDROCHLORIDE IN 0.9% SODIUM CHLORIDE 7.82 MICROGRAM(S)/KG/HR: 4 INJECTION INTRAVENOUS at 00:34

## 2017-11-08 RX ADMIN — DEXMEDETOMIDINE HYDROCHLORIDE IN 0.9% SODIUM CHLORIDE 7.82 MICROGRAM(S)/KG/HR: 4 INJECTION INTRAVENOUS at 18:18

## 2017-11-08 RX ADMIN — PIPERACILLIN AND TAZOBACTAM 25 GRAM(S): 4; .5 INJECTION, POWDER, LYOPHILIZED, FOR SOLUTION INTRAVENOUS at 05:18

## 2017-11-08 RX ADMIN — Medication 1 MILLIGRAM(S): at 11:38

## 2017-11-08 RX ADMIN — DEXMEDETOMIDINE HYDROCHLORIDE IN 0.9% SODIUM CHLORIDE 7.82 MICROGRAM(S)/KG/HR: 4 INJECTION INTRAVENOUS at 06:29

## 2017-11-08 RX ADMIN — LACTULOSE 10 GRAM(S): 10 SOLUTION ORAL at 05:16

## 2017-11-08 RX ADMIN — Medication 650 MILLIGRAM(S): at 14:56

## 2017-11-08 RX ADMIN — Medication 100 MILLIGRAM(S): at 14:55

## 2017-11-08 RX ADMIN — Medication 10 MILLIGRAM(S): at 18:01

## 2017-11-08 NOTE — PROGRESS NOTE ADULT - ASSESSMENT
61 y/o M with a h/o COPD, a-fib (no a/c), CHF, alcoholic cardiomyopathy, alcoholic cirrhosis, EtOH abuse, EtOH withdrawal with EtOH withdrawal, acute hypoxic respiratory failure requiring intubation and several reintubations, AMS, sepsis, aspiration pneumonia. 61 y/o M with a h/o COPD, a-fib (no a/c), CHF, alcoholic cardiomyopathy, alcoholic cirrhosis, EtOH abuse, EtOH withdrawal with EtOH withdrawal, acute hypoxic respiratory failure requiring intubation and several reintubations, AMS, sepsis, aspiration pneumonia.    Daughter, Litzy, updated in detail on patient's current situation. Expressed that her and other daughter have decided that if it came to it, they would want patient to undergo tracheostomy again.

## 2017-11-08 NOTE — PROGRESS NOTE ADULT - PROBLEM SELECTOR PLAN 1
Remains intubated for airway protection. Continue mechanical ventilation. On full vent support. Will titrate to maintain SaO2 > 92% and wean as tolerated. Ideally would like to monitor mental status off sedation but requiring Precedex secondary to attempts to self extubate- will wean as tolerated. Mental status not yet appropriate for extubation, especially in setting of multiple reintubations for airway protection. Suction PRN.

## 2017-11-08 NOTE — PROGRESS NOTE ADULT - PROBLEM SELECTOR PLAN 4
Antibiotic therapy changed to aztreonam. Sputum cultures grew pseudomonas. Suction PRN. Chest PT. Blood cultures neg for growth.

## 2017-11-08 NOTE — CONSULT NOTE ADULT - SUBJECTIVE AND OBJECTIVE BOX
HPI: The patient is a 62-year-old male w/afib, dilated cardiomyopathy, alcohol cirrhosis and history of intubation and tracheostomy who has been hospitalized since 10/28 for COPD exacerbation as well as alcohol withdrawal. He was intubated for acute hypoxic respiratory failure and acute alcohol withdrawal. The patient has had multiple self-extubations and need for re-intubations throughout this hospital stay, which has also been complicated by pseudomonas aspiration pneumonia  He is unable to be weaned from sedation or ventilatory support. He is currently intubated and sedated in the MICU. Was febrile to 101 today; blood cultures have had no growth for five days, but given recent fever repeat cultures were sent.        PAST MEDICAL & SURGICAL HISTORY:  Falls  Meningitis  Collapsed lung  Alcohol withdrawal  Emphysema of lung  ETOH abuse  Cirrhosis  Afib  CHF (congestive heart failure)  Poor historian  Alcohol abuse  Chronic atrial fibrillation  S/P BKA (below knee amputation), left: secondary to worsening infection in lower leg. Had both ankle injuries from fall s/p repair - left had complication.  S/P BKA (below knee amputation) unilateral, left      REVIEW OF SYSTEMS      General: currently intubated and sedated    MEDICATIONS  (STANDING):  ALBUTerol/ipratropium for Nebulization 3 milliLiter(s) Nebulizer every 6 hours  aspirin  chewable 81 milliGRAM(s) Oral daily  aztreonam  IVPB 2000 milliGRAM(s) IV Intermittent every 8 hours  bisacodyl Suppository 10 milliGRAM(s) Rectal two times a day  chlorhexidine 0.12% Liquid 15 milliLiter(s) Swish and Spit two times a day  dexmedetomidine Infusion 0.3 MICROgram(s)/kG/Hr (7.822 mL/Hr) IV Continuous <Continuous>  enoxaparin Injectable 40 milliGRAM(s) SubCutaneous daily  folic acid 1 milliGRAM(s) Oral daily  furosemide    Tablet 20 milliGRAM(s) Oral daily  influenza   Vaccine 0.5 milliLiter(s) IntraMuscular once  lactulose Syrup 10 Gram(s) Oral every 12 hours  lisinopril 5 milliGRAM(s) Oral daily  metoprolol     tartrate 25 milliGRAM(s) Oral every 8 hours  multivitamin 1 Tablet(s) Oral daily  nicotine - 21 mG/24Hr(s) Patch 1 patch Transdermal daily  pantoprazole  Injectable 40 milliGRAM(s) IV Push daily  polyethylene glycol 3350 17 Gram(s) Oral daily  propofol Infusion 20 MICROgram(s)/kG/Min (12.516 mL/Hr) IV Continuous <Continuous>  risperiDONE   Tablet 0.25 milliGRAM(s) Oral at bedtime  senna 2 Tablet(s) Oral at bedtime    MEDICATIONS  (PRN):  acetaminophen    Suspension 650 milliGRAM(s) Oral every 6 hours PRN For Temp greater than 38 C (100.4 F)  acetaminophen    Suspension 650 milliGRAM(s) Oral every 6 hours PRN For Temp greater than 38 C (100.4 F)  ALBUTerol    0.083% 2.5 milliGRAM(s) Nebulizer every 4 hours PRN Shortness of Breath and/or Wheezing  ondansetron Injectable 8 milliGRAM(s) IV Push every 6 hours PRN Vomiting      Allergies    Ceclor (Rash)    Intolerances        SOCIAL HISTORY:  Occupation:  Smoking Hx: denies  Etoh Hx: denies  IVDA Hx: denies    FAMILY HISTORY:  Family history unknown: Adopted  No pertinent family history in first degree relatives    unless noted, no significant family hx with Mother, Father, Siblings    Vital Signs Last 24 Hrs  T(C): 37.5 (08 Nov 2017 20:09), Max: 38.6 (08 Nov 2017 14:00)  T(F): 99.5 (08 Nov 2017 20:09), Max: 101.5 (08 Nov 2017 14:00)  HR: 86 (08 Nov 2017 20:09) (57 - 99)  BP: 95/55 (08 Nov 2017 20:09) (71/46 - 147/80)  BP(mean): 71 (08 Nov 2017 20:09) (52 - 103)  RR: 16 (08 Nov 2017 20:09) (4 - 26)  SpO2: 97% (08 Nov 2017 20:09) (90% - 100%)    Genera: intubated and sedated  HEENT: Pupils equal, reactive to light.  Symmetric.  Pulm: b/l rhoncerous breath sounds  CV: Irregularly irregular rhythm, rate in the 80s, no murmuers/gallops/rubs  Abdomen: softely distended, non-tender  Extremeties: b/l trace edema, s/p left BKA  NEURO: moves all extremities       LABS:                        12.4   8.0   )-----------( 171      ( 08 Nov 2017 04:21 )             38.2     11-08    140  |  99  |  18.0  ----------------------------<  110  4.5   |  30.0<H>  |  0.68    Ca    8.9      08 Nov 2017 04:21  Phos  4.1     11-08  Mg     1.9     11-08

## 2017-11-08 NOTE — PROGRESS NOTE ADULT - PROBLEM SELECTOR PLAN 5
No further signs of acute withdrawal. Avoid all sedative medications when possible and continue to monitor mental status.

## 2017-11-08 NOTE — CONSULT NOTE ADULT - PROBLEM SELECTOR RECOMMENDATION 9
- acute hypoxic respiratory failure complicated by pseudomonas aspiration pneumonia who is unable to be weaned from ventilatory support  - will be evaluated for tracheostomy  - d/w thoracic attending

## 2017-11-08 NOTE — PROGRESS NOTE ADULT - SUBJECTIVE AND OBJECTIVE BOX
Patient is a 62y old  Male who presents with a chief complaint of Shortness of breath (30 Oct 2017 15:03)      BRIEF HOSPITAL COURSE: ***    Events last 24 hours: ***    PAST MEDICAL & SURGICAL HISTORY:  Falls  Meningitis  Collapsed lung  Alcohol withdrawal  Emphysema of lung  ETOH abuse  Cirrhosis  Afib  CHF (congestive heart failure)  Poor historian  Alcohol abuse  Chronic atrial fibrillation  S/P BKA (below knee amputation), left: secondary to worsening infection in lower leg. Had both ankle injuries from fall s/p repair - left had complication.  S/P BKA (below knee amputation) unilateral, left      Review of Systems:  CONSTITUTIONAL: No fever, chills, or fatigue  EYES: No eye pain, visual disturbances, or discharge  ENMT:  No difficulty hearing, tinnitus, vertigo; No sinus or throat pain  NECK: No pain or stiffness  RESPIRATORY: No cough, wheezing, chills or hemoptysis; No shortness of breath  CARDIOVASCULAR: No chest pain, palpitations, dizziness, or leg swelling  GASTROINTESTINAL: No abdominal or epigastric pain. No nausea, vomiting, or hematemesis; No diarrhea or constipation. No melena or hematochezia.  GENITOURINARY: No dysuria, frequency, hematuria, or incontinence  NEUROLOGICAL: No headaches, memory loss, loss of strength, numbness, or tremors  SKIN: No itching, burning, rashes, or lesions   MUSCULOSKELETAL: No joint pain or swelling; No muscle, back, or extremity pain  PSYCHIATRIC: No depression, anxiety, mood swings, or difficulty sleeping      Medications:  aztreonam  IVPB 2000 milliGRAM(s) IV Intermittent every 8 hours    furosemide    Tablet 20 milliGRAM(s) Oral daily  lisinopril 5 milliGRAM(s) Oral daily  metoprolol     tartrate 25 milliGRAM(s) Oral every 8 hours    ALBUTerol    0.083% 2.5 milliGRAM(s) Nebulizer every 4 hours PRN  ALBUTerol/ipratropium for Nebulization 3 milliLiter(s) Nebulizer every 6 hours    acetaminophen    Suspension 650 milliGRAM(s) Oral every 6 hours PRN  acetaminophen    Suspension 650 milliGRAM(s) Oral every 6 hours PRN  dexmedetomidine Infusion 0.3 MICROgram(s)/kG/Hr IV Continuous <Continuous>  ondansetron Injectable 8 milliGRAM(s) IV Push every 6 hours PRN  risperiDONE   Tablet 0.25 milliGRAM(s) Oral at bedtime      aspirin  chewable 81 milliGRAM(s) Oral daily  enoxaparin Injectable 40 milliGRAM(s) SubCutaneous daily    bisacodyl Suppository 10 milliGRAM(s) Rectal two times a day  lactulose Syrup 10 Gram(s) Oral every 12 hours  pantoprazole  Injectable 40 milliGRAM(s) IV Push daily  polyethylene glycol 3350 17 Gram(s) Oral daily  senna 2 Tablet(s) Oral at bedtime        folic acid 1 milliGRAM(s) Oral daily  multivitamin 1 Tablet(s) Oral daily    influenza   Vaccine 0.5 milliLiter(s) IntraMuscular once    chlorhexidine 0.12% Liquid 15 milliLiter(s) Swish and Spit two times a day    nicotine - 21 mG/24Hr(s) Patch 1 patch Transdermal daily      Mode: CPAP with PS  FiO2: 30  PEEP: 5  PS: 10  MAP: 9  PIP: 17      ICU Vital Signs Last 24 Hrs  T(C): 37.6 (08 Nov 2017 12:00), Max: 37.6 (08 Nov 2017 11:53)  T(F): 99.7 (08 Nov 2017 12:00), Max: 99.7 (08 Nov 2017 11:53)  HR: 84 (08 Nov 2017 12:00) (52 - 88)  BP: 107/55 (08 Nov 2017 12:00) (103/56 - 147/80)  BP(mean): 102 (08 Nov 2017 06:00) (76 - 103)  ABP: --  ABP(mean): --  RR: 26 (08 Nov 2017 12:00) (4 - 26)  SpO2: 95% (08 Nov 2017 12:00) (91% - 100%)          I&O's Detail    07 Nov 2017 07:01  -  08 Nov 2017 07:00  --------------------------------------------------------  IN:    dexmedetomidine Infusion: 617.1 mL    Enteral Tube Flush: 220 mL    Solution: 175 mL  Total IN: 1012.1 mL    OUT:    Indwelling Catheter - Urethral: 1440 mL  Total OUT: 1440 mL    Total NET: -427.9 mL      08 Nov 2017 07:01  -  08 Nov 2017 12:01  --------------------------------------------------------  IN:    dexmedetomidine Infusion: 52.2 mL  Total IN: 52.2 mL    OUT:    Indwelling Catheter - Urethral: 775 mL  Total OUT: 775 mL    Total NET: -722.8 mL            LABS:                        12.4   8.0   )-----------( 171      ( 08 Nov 2017 04:21 )             38.2     11-08    140  |  99  |  18.0  ----------------------------<  110  4.5   |  30.0<H>  |  0.68    Ca    8.9      08 Nov 2017 04:21  Phos  4.1     11-08  Mg     1.9     11-08            CAPILLARY BLOOD GLUCOSE            CULTURES:  Culture Results:   Moderate Pseudomonas aeruginosa  Few Routine respiratory raymundo present (11-01-17 @ 15:10)  Culture Results:   No growth at 5 days. (11-01-17 @ 15:09)  Culture Results:   No growth at 5 days. (11-01-17 @ 15:08)      Physical Examination:    General: No acute distress.  Alert, oriented, interactive, nonfocal    HEENT: Pupils equal, reactive to light.  Symmetric.    PULM: Clear to auscultation bilaterally, no significant sputum production    CVS: Regular rate and rhythm, no murmurs, rubs, or gallops    ABD: Soft, nondistended, nontender, normoactive bowel sounds, no masses    EXT: No edema, nontender    SKIN: Warm and well perfused, no rashes noted.    NEURO: A&Ox3, strength 5/5 all extremities, cranial nerves grossly intact, no focal deficits    RADIOLOGY: ***    CRITICAL CARE TIME SPENT: ***  Evaluating/treating patient, reviewing data/labs/imaging, discussing case with multidisciplinary team, discussing plan/goals of care with patient/family. Non-inclusive of procedure time. Patient is a 62y old  Male who presents with a chief complaint of Shortness of breath (30 Oct 2017 15:03)      61 y/o M with a h/o COPD, a-fib (no a/c), CHF, alcoholic cirrhosis, EtOH abuse, EtOH withdrawal (intubated in past for airway protection), initially admitted on 10/28 for COPD exacerbation and alcohol intoxication, signed out AMA and was found later on in hospital disoriented. Readmitted and RRT called later on when patient became tremulous, agitated, delirious, and began hallucinating. Transferred to MICU for further management with benzos and precedex gtt. Patient became obtunded, lost ability to protect airway, and was subsequently intubated. As per report, patient is highly sensitive to benzodiazepines.  CT head neg for acute events. Course complicated by a-fib with RVR, sepsis, pseudomonas aspiration pneumonia. Self extubated on 11/2 without need for reintubation. Reintubated on 11/4 for obtundation, airway protection. Mental status has remained poor. EEG neg for nonconvulsive seizure activity.    Events last 24 hours: ETT cuff blown overnight, tube exchange completed successfully (this marks the 5th reintubation of this patient's hospital stay). Patient's mental status with some improvement today, will open eyes to name, will attempt to follow some basic commands. Very weak and slow moving. Mental status waxing and waning. Tolerating CPAP 10/5. Period of agitation, tachypnea, and attempting to self extubate- Precedex gtt restarted and placed back on full vent support. No BM since 10/30. MRI brain unremarkable.    PAST MEDICAL & SURGICAL HISTORY:  Falls  Meningitis  Collapsed lung  Alcohol withdrawal  Emphysema of lung  ETOH abuse  Cirrhosis  Afib  CHF (congestive heart failure)  Poor historian  Alcohol abuse  Chronic atrial fibrillation  S/P BKA (below knee amputation), left: secondary to worsening infection in lower leg. Had both ankle injuries from fall s/p repair - left had complication.  S/P BKA (below knee amputation) unilateral, left      Review of Systems: Unable to obtain secondary to mental status.        Medications:  aztreonam  IVPB 2000 milliGRAM(s) IV Intermittent every 8 hours  furosemide    Tablet 20 milliGRAM(s) Oral daily  lisinopril 5 milliGRAM(s) Oral daily  metoprolol     tartrate 25 milliGRAM(s) Oral every 8 hours  ALBUTerol    0.083% 2.5 milliGRAM(s) Nebulizer every 4 hours PRN  ALBUTerol/ipratropium for Nebulization 3 milliLiter(s) Nebulizer every 6 hours  acetaminophen    Suspension 650 milliGRAM(s) Oral every 6 hours PRN  acetaminophen    Suspension 650 milliGRAM(s) Oral every 6 hours PRN  dexmedetomidine Infusion 0.3 MICROgram(s)/kG/Hr IV Continuous <Continuous>  ondansetron Injectable 8 milliGRAM(s) IV Push every 6 hours PRN  risperiDONE   Tablet 0.25 milliGRAM(s) Oral at bedtime  aspirin  chewable 81 milliGRAM(s) Oral daily  enoxaparin Injectable 40 milliGRAM(s) SubCutaneous daily  bisacodyl Suppository 10 milliGRAM(s) Rectal two times a day  lactulose Syrup 10 Gram(s) Oral every 12 hours  pantoprazole  Injectable 40 milliGRAM(s) IV Push daily  polyethylene glycol 3350 17 Gram(s) Oral daily  senna 2 Tablet(s) Oral at bedtime  folic acid 1 milliGRAM(s) Oral daily  multivitamin 1 Tablet(s) Oral daily  influenza   Vaccine 0.5 milliLiter(s) IntraMuscular once  chlorhexidine 0.12% Liquid 15 milliLiter(s) Swish and Spit two times a day  nicotine - 21 mG/24Hr(s) Patch 1 patch Transdermal daily      Mode: CPAP with PS  FiO2: 30  PEEP: 5  PS: 10  MAP: 9  PIP: 17      ICU Vital Signs Last 24 Hrs  T(C): 37.6 (08 Nov 2017 12:00), Max: 37.6 (08 Nov 2017 11:53)  T(F): 99.7 (08 Nov 2017 12:00), Max: 99.7 (08 Nov 2017 11:53)  HR: 84 (08 Nov 2017 12:00) (52 - 88)  BP: 107/55 (08 Nov 2017 12:00) (103/56 - 147/80)  BP(mean): 102 (08 Nov 2017 06:00) (76 - 103)  ABP: --  ABP(mean): --  RR: 26 (08 Nov 2017 12:00) (4 - 26)  SpO2: 95% (08 Nov 2017 12:00) (91% - 100%)          I&O's Detail    07 Nov 2017 07:01  -  08 Nov 2017 07:00  --------------------------------------------------------  IN:    dexmedetomidine Infusion: 617.1 mL    Enteral Tube Flush: 220 mL    Solution: 175 mL  Total IN: 1012.1 mL    OUT:    Indwelling Catheter - Urethral: 1440 mL  Total OUT: 1440 mL    Total NET: -427.9 mL      08 Nov 2017 07:01  -  08 Nov 2017 12:01  --------------------------------------------------------  IN:    dexmedetomidine Infusion: 52.2 mL  Total IN: 52.2 mL    OUT:    Indwelling Catheter - Urethral: 775 mL  Total OUT: 775 mL    Total NET: -722.8 mL            LABS:                        12.4   8.0   )-----------( 171      ( 08 Nov 2017 04:21 )             38.2     11-08    140  |  99  |  18.0  ----------------------------<  110  4.5   |  30.0<H>  |  0.68    Ca    8.9      08 Nov 2017 04:21  Phos  4.1     11-08  Mg     1.9     11-08            CAPILLARY BLOOD GLUCOSE            CULTURES:  Culture Results:   Moderate Pseudomonas aeruginosa  Few Routine respiratory raymundo present (11-01-17 @ 15:10)  Culture Results:   No growth at 5 days. (11-01-17 @ 15:09)  Culture Results:   No growth at 5 days. (11-01-17 @ 15:08)      Physical Examination:    General: No acute distress.  Alert, oriented, interactive, nonfocal    HEENT: Pupils equal, reactive to light.  Symmetric.    PULM: Coarse breath sounds bilaterally, no significant sputum production    CVS: Regular rate, irregularly irregular rhythm, no murmurs, rubs, or gallops    ABD: Soft, mild distention, nontender, normoactive bowel sounds, no masses    EXT: No edema, nontender, left BKA    SKIN: Warm and well perfused, no rashes noted.    NEURO: see HPI, moves all extremities spontaneously    RADIOLOGY:     < from: Xray Chest 1 View AP/PA. (11.08.17 @ 01:22) >  Findings: Left lower lobe atelectasis. Lungs otherwise clear. No pleural   effusions. ET tube in good position at the level of the clavicles.   Nasogastric tube in stomach. The pulmonary vasculature and aorta are   normal for age. Cardiomegaly. The thorax is normal for age.    Impression: Left lower lobe atelectasis. Cardiomegaly unchanged.    < from: MRI Brain w/Diffusion (11.07.17 @ 16:51) >  FINDINGS: Moderate motion artifact.  There is no abnormal restricted diffusion to suggest acute infarction.   Scattered periventricular and subcortical white matter T2 /FLAIR   hyperintensities are seen without mass effect, nonspecific, likely   representing mild chronic microvascular changes.  Normal T2 flow-voids are seen within  the intracranial vasculature. The   lateral ventricles and cortical sulci are normal in size and   configuration. There is no mass, mass effect, or extra-axial fluid   collection. There is no susceptibility artifact to suggest hemorrhage.   Midline structures are normal.  Mild inflammatory mucosal changes are   seen throughout the various portions of the paranasal sinuses. The orbits   and mastoid air cells are unremarkable.       IMPRESSION: No acute infarction.        CRITICAL CARE TIME SPENT: 65 mins  Evaluating/treating patient, reviewing data/labs/imaging, discussing case with multidisciplinary team, discussing plan/goals of care with patient/family. Non-inclusive of procedure time.

## 2017-11-08 NOTE — CONSULT NOTE ADULT - ASSESSMENT
The patient is a 62-year-old male w/afib, dilated cardiomyopathy, alcohol cirrhosis and history of intubation and tracheostomy who has been intubated for acute hypoxic respiratory failure and acute alcohol withdrawal. The patient has had multiple self-extubations and need for re-intubations throughout this hospital stay and is unable to be weaned from sedation or ventilatory support.

## 2017-11-08 NOTE — PROGRESS NOTE ADULT - ATTENDING COMMENTS
Pt seen and examined with CCM PA on rounds , agree with above assessment and plan. Currently unable to obtain a steady calm state where pt is cooperative for SBT he is either agitated or completely sedated, remains with copious secretions, Risperdal started HS to promote sleep wake cycle, pt has self extubated numerous times and has had a trach in the past, the likelihood of him requiring repeat tracheostomy is high. His daughter Litzy is agreeable to trach should he fail another extubation attempt. Will contact CTS re:trach. Pt remains critically ill.   total attending CC time 60min

## 2017-11-09 LAB
ANION GAP SERPL CALC-SCNC: 14 MMOL/L — SIGNIFICANT CHANGE UP (ref 5–17)
BASOPHILS # BLD AUTO: 0 K/UL — SIGNIFICANT CHANGE UP (ref 0–0.2)
BASOPHILS NFR BLD AUTO: 0.1 % — SIGNIFICANT CHANGE UP (ref 0–2)
BUN SERPL-MCNC: 18 MG/DL — SIGNIFICANT CHANGE UP (ref 8–20)
CALCIUM SERPL-MCNC: 8.6 MG/DL — SIGNIFICANT CHANGE UP (ref 8.6–10.2)
CHLORIDE SERPL-SCNC: 99 MMOL/L — SIGNIFICANT CHANGE UP (ref 98–107)
CO2 SERPL-SCNC: 26 MMOL/L — SIGNIFICANT CHANGE UP (ref 22–29)
CREAT SERPL-MCNC: 0.63 MG/DL — SIGNIFICANT CHANGE UP (ref 0.5–1.3)
EOSINOPHIL # BLD AUTO: 0 K/UL — SIGNIFICANT CHANGE UP (ref 0–0.5)
EOSINOPHIL NFR BLD AUTO: 0.1 % — SIGNIFICANT CHANGE UP (ref 0–5)
GLUCOSE SERPL-MCNC: 94 MG/DL — SIGNIFICANT CHANGE UP (ref 70–115)
HCT VFR BLD CALC: 35.5 % — LOW (ref 42–52)
HGB BLD-MCNC: 11.8 G/DL — LOW (ref 14–18)
LYMPHOCYTES # BLD AUTO: 0.9 K/UL — LOW (ref 1–4.8)
LYMPHOCYTES # BLD AUTO: 7.5 % — LOW (ref 20–55)
MAGNESIUM SERPL-MCNC: 2 MG/DL — SIGNIFICANT CHANGE UP (ref 1.6–2.6)
MCHC RBC-ENTMCNC: 30.5 PG — SIGNIFICANT CHANGE UP (ref 27–31)
MCHC RBC-ENTMCNC: 33.2 G/DL — SIGNIFICANT CHANGE UP (ref 32–36)
MCV RBC AUTO: 91.7 FL — SIGNIFICANT CHANGE UP (ref 80–94)
MONOCYTES # BLD AUTO: 1 K/UL — HIGH (ref 0–0.8)
MONOCYTES NFR BLD AUTO: 8 % — SIGNIFICANT CHANGE UP (ref 3–10)
NEUTROPHILS # BLD AUTO: 10.3 K/UL — HIGH (ref 1.8–8)
NEUTROPHILS NFR BLD AUTO: 83.8 % — HIGH (ref 37–73)
PHOSPHATE SERPL-MCNC: 3 MG/DL — SIGNIFICANT CHANGE UP (ref 2.4–4.7)
PLATELET # BLD AUTO: 195 K/UL — SIGNIFICANT CHANGE UP (ref 150–400)
POTASSIUM SERPL-MCNC: 3.7 MMOL/L — SIGNIFICANT CHANGE UP (ref 3.5–5.3)
POTASSIUM SERPL-SCNC: 3.7 MMOL/L — SIGNIFICANT CHANGE UP (ref 3.5–5.3)
RBC # BLD: 3.87 M/UL — LOW (ref 4.6–6.2)
RBC # FLD: 16.3 % — HIGH (ref 11–15.6)
SODIUM SERPL-SCNC: 139 MMOL/L — SIGNIFICANT CHANGE UP (ref 135–145)
WBC # BLD: 12.3 K/UL — HIGH (ref 4.8–10.8)
WBC # FLD AUTO: 12.3 K/UL — HIGH (ref 4.8–10.8)

## 2017-11-09 PROCEDURE — 99291 CRITICAL CARE FIRST HOUR: CPT

## 2017-11-09 PROCEDURE — 99223 1ST HOSP IP/OBS HIGH 75: CPT

## 2017-11-09 RX ORDER — POTASSIUM CHLORIDE 20 MEQ
10 PACKET (EA) ORAL
Qty: 0 | Refills: 0 | Status: COMPLETED | OUTPATIENT
Start: 2017-11-09 | End: 2017-11-09

## 2017-11-09 RX ORDER — TRAZODONE HCL 50 MG
50 TABLET ORAL AT BEDTIME
Qty: 0 | Refills: 0 | Status: DISCONTINUED | OUTPATIENT
Start: 2017-11-09 | End: 2017-11-11

## 2017-11-09 RX ORDER — SCOPALAMINE 1 MG/3D
1.5 PATCH, EXTENDED RELEASE TRANSDERMAL
Qty: 0 | Refills: 0 | Status: DISCONTINUED | OUTPATIENT
Start: 2017-11-09 | End: 2017-11-11

## 2017-11-09 RX ORDER — TAMSULOSIN HYDROCHLORIDE 0.4 MG/1
0.4 CAPSULE ORAL AT BEDTIME
Qty: 0 | Refills: 0 | Status: DISCONTINUED | OUTPATIENT
Start: 2017-11-09 | End: 2017-12-14

## 2017-11-09 RX ORDER — METOPROLOL TARTRATE 50 MG
5 TABLET ORAL ONCE
Qty: 0 | Refills: 0 | Status: COMPLETED | OUTPATIENT
Start: 2017-11-09 | End: 2017-11-09

## 2017-11-09 RX ORDER — METOPROLOL TARTRATE 50 MG
25 TABLET ORAL ONCE
Qty: 0 | Refills: 0 | Status: COMPLETED | OUTPATIENT
Start: 2017-11-09 | End: 2017-11-09

## 2017-11-09 RX ORDER — METOPROLOL TARTRATE 50 MG
50 TABLET ORAL EVERY 8 HOURS
Qty: 0 | Refills: 0 | Status: DISCONTINUED | OUTPATIENT
Start: 2017-11-09 | End: 2017-11-12

## 2017-11-09 RX ADMIN — ENOXAPARIN SODIUM 40 MILLIGRAM(S): 100 INJECTION SUBCUTANEOUS at 11:39

## 2017-11-09 RX ADMIN — CHLORHEXIDINE GLUCONATE 15 MILLILITER(S): 213 SOLUTION TOPICAL at 05:15

## 2017-11-09 RX ADMIN — Medication 100 MILLIEQUIVALENT(S): at 10:39

## 2017-11-09 RX ADMIN — Medication 50 MILLIGRAM(S): at 16:17

## 2017-11-09 RX ADMIN — Medication 3 MILLILITER(S): at 08:21

## 2017-11-09 RX ADMIN — Medication 50 MILLIGRAM(S): at 22:45

## 2017-11-09 RX ADMIN — Medication 25 MILLIGRAM(S): at 05:15

## 2017-11-09 RX ADMIN — PROPOFOL 12.52 MICROGRAM(S)/KG/MIN: 10 INJECTION, EMULSION INTRAVENOUS at 01:05

## 2017-11-09 RX ADMIN — PROPOFOL 12.52 MICROGRAM(S)/KG/MIN: 10 INJECTION, EMULSION INTRAVENOUS at 22:49

## 2017-11-09 RX ADMIN — CHLORHEXIDINE GLUCONATE 15 MILLILITER(S): 213 SOLUTION TOPICAL at 17:33

## 2017-11-09 RX ADMIN — LISINOPRIL 5 MILLIGRAM(S): 2.5 TABLET ORAL at 05:15

## 2017-11-09 RX ADMIN — Medication 100 MILLIEQUIVALENT(S): at 09:35

## 2017-11-09 RX ADMIN — Medication 1 PATCH: at 11:39

## 2017-11-09 RX ADMIN — Medication 1 MILLIGRAM(S): at 11:39

## 2017-11-09 RX ADMIN — Medication 1 TABLET(S): at 11:39

## 2017-11-09 RX ADMIN — Medication 50 MILLIGRAM(S): at 22:49

## 2017-11-09 RX ADMIN — Medication 3 MILLILITER(S): at 15:08

## 2017-11-09 RX ADMIN — Medication 5 MILLIGRAM(S): at 12:05

## 2017-11-09 RX ADMIN — TAMSULOSIN HYDROCHLORIDE 0.4 MILLIGRAM(S): 0.4 CAPSULE ORAL at 22:45

## 2017-11-09 RX ADMIN — Medication 20 MILLIGRAM(S): at 05:14

## 2017-11-09 RX ADMIN — Medication 25 MILLIGRAM(S): at 11:40

## 2017-11-09 RX ADMIN — Medication 81 MILLIGRAM(S): at 11:39

## 2017-11-09 RX ADMIN — Medication 1 PATCH: at 11:40

## 2017-11-09 RX ADMIN — PROPOFOL 12.52 MICROGRAM(S)/KG/MIN: 10 INJECTION, EMULSION INTRAVENOUS at 14:58

## 2017-11-09 RX ADMIN — PANTOPRAZOLE SODIUM 40 MILLIGRAM(S): 20 TABLET, DELAYED RELEASE ORAL at 11:39

## 2017-11-09 RX ADMIN — SCOPALAMINE 1.5 MILLIGRAM(S): 1 PATCH, EXTENDED RELEASE TRANSDERMAL at 11:39

## 2017-11-09 RX ADMIN — Medication 3 MILLILITER(S): at 03:48

## 2017-11-09 RX ADMIN — Medication 100 MILLIGRAM(S): at 13:54

## 2017-11-09 RX ADMIN — Medication 100 MILLIGRAM(S): at 05:15

## 2017-11-09 RX ADMIN — Medication 3 MILLILITER(S): at 20:28

## 2017-11-09 RX ADMIN — Medication 100 MILLIEQUIVALENT(S): at 11:40

## 2017-11-09 RX ADMIN — Medication 100 MILLIGRAM(S): at 22:45

## 2017-11-09 NOTE — PROGRESS NOTE ADULT - PROBLEM SELECTOR PLAN 4
Antibiotic therapy changed to aztreonam. Sputum cultures grew pseudomonas. Will finish course of abx tomorrow.  Suction PRN. Chest PT. Blood cultures neg for growth.

## 2017-11-09 NOTE — PROGRESS NOTE ADULT - ASSESSMENT
61 y/o M with a h/o COPD, a-fib (no a/c), CHF, alcoholic cardiomyopathy, alcoholic cirrhosis, EtOH abuse, EtOH withdrawal with EtOH withdrawal, acute hypoxic respiratory failure requiring intubation and several reintubations, AMS, sepsis, aspiration pneumonia.

## 2017-11-09 NOTE — PROGRESS NOTE ADULT - SUBJECTIVE AND OBJECTIVE BOX
Patient is a 62y old  Male who presents with a chief complaint of Shortness of breath (30 Oct 2017 15:03)      BRIEF HOSPITAL COURSE: 63 y/o M with a h/o COPD, a-fib (no a/c), CHF, alcoholic cirrhosis, EtOH abuse, EtOH withdrawal (intubated in past for airway protection), initially admitted on 10/28 for COPD exacerbation and alcohol intoxication, signed out AMA and was found later on in hospital disoriented. Readmitted and RRT called later on when patient became tremulous, agitated, delirious, and began hallucinating. Transferred to MICU for further management with benzos and precedex gtt. Patient became obtunded, lost ability to protect airway, and was subsequently intubated. As per report, patient is highly sensitive to benzodiazepines.  CT head neg for acute events. Course complicated by a-fib with RVR, sepsis, pseudomonas aspiration pneumonia. Self extubated on 11/2 without need for reintubation initially but on the morning of 11/4 patient obtunded  with difficulty managing secretions. Reintubated for airway protection. Pt has had recurrent       Events last 24 hours: bit through  balloon, tube exchanged overnight, copious secretions remain    PAST MEDICAL & SURGICAL HISTORY:  Falls  Meningitis  Collapsed lung  Alcohol withdrawal  Emphysema of lung  ETOH abuse  Cirrhosis  Afib  CHF (congestive heart failure)  Poor historian  Alcohol abuse  Chronic atrial fibrillation  S/P BKA (below knee amputation), left: secondary to worsening infection in lower leg. Had both ankle injuries from fall s/p repair - left had complication.  S/P BKA (below knee amputation) unilateral, left      Review of Systems:  CONSTITUTIONAL: No fever, chills, or fatigue  EYES: No eye pain, visual disturbances, or discharge  ENMT:  No difficulty hearing, tinnitus, vertigo; No sinus or throat pain  NECK: No pain or stiffness  RESPIRATORY: No cough, wheezing, chills or hemoptysis; No shortness of breath  CARDIOVASCULAR: No chest pain, palpitations, dizziness, or leg swelling  GASTROINTESTINAL: No abdominal or epigastric pain. No nausea, vomiting, or hematemesis; No diarrhea or constipation. No melena or hematochezia.  GENITOURINARY: No dysuria, frequency, hematuria, or incontinence  NEUROLOGICAL: No headaches, memory loss, loss of strength, numbness, or tremors  SKIN: No itching, burning, rashes, or lesions   MUSCULOSKELETAL: No joint pain or swelling; No muscle, back, or extremity pain  PSYCHIATRIC: No depression, anxiety, mood swings, or difficulty sleeping      Medications:  aztreonam  IVPB 2000 milliGRAM(s) IV Intermittent every 8 hours    furosemide    Tablet 20 milliGRAM(s) Oral daily  lisinopril 5 milliGRAM(s) Oral daily  metoprolol     tartrate 50 milliGRAM(s) Oral every 8 hours  tamsulosin 0.4 milliGRAM(s) Oral at bedtime    ALBUTerol    0.083% 2.5 milliGRAM(s) Nebulizer every 4 hours PRN  ALBUTerol/ipratropium for Nebulization 3 milliLiter(s) Nebulizer every 6 hours    acetaminophen    Suspension 650 milliGRAM(s) Oral every 6 hours PRN  acetaminophen    Suspension 650 milliGRAM(s) Oral every 6 hours PRN  ondansetron Injectable 8 milliGRAM(s) IV Push every 6 hours PRN  propofol Infusion 20 MICROgram(s)/kG/Min IV Continuous <Continuous>  scopolamine   Patch 1.5 milliGRAM(s) Transdermal every 3 days  traZODone 50 milliGRAM(s) Oral at bedtime      aspirin  chewable 81 milliGRAM(s) Oral daily  enoxaparin Injectable 40 milliGRAM(s) SubCutaneous daily    bisacodyl Suppository 10 milliGRAM(s) Rectal daily PRN  lactulose Syrup 10 Gram(s) Oral every 12 hours  pantoprazole  Injectable 40 milliGRAM(s) IV Push daily        folic acid 1 milliGRAM(s) Oral daily  multivitamin 1 Tablet(s) Oral daily    influenza   Vaccine 0.5 milliLiter(s) IntraMuscular once    chlorhexidine 0.12% Liquid 15 milliLiter(s) Swish and Spit two times a day    nicotine - 21 mG/24Hr(s) Patch 1 patch Transdermal daily      Mode: AC/ CMV (Assist Control/ Continuous Mandatory Ventilation)  RR (machine): 12  TV (machine): 480  FiO2: 30  PEEP: 5  MAP: 10  PIP: 26      ICU Vital Signs Last 24 Hrs  T(C): 37.9 (09 Nov 2017 14:00), Max: 38.3 (09 Nov 2017 08:00)  T(F): 100.2 (09 Nov 2017 14:00), Max: 100.9 (09 Nov 2017 08:00)  HR: 98 (09 Nov 2017 15:05) (68 - 129)  BP: 98/62 (09 Nov 2017 14:00) (71/46 - 143/69)  BP(mean): 72 (09 Nov 2017 14:00) (52 - 99)  ABP: --  ABP(mean): --  RR: 20 (09 Nov 2017 14:00) (12 - 35)  SpO2: 93% (09 Nov 2017 15:05) (92% - 98%)          I&O's Detail    08 Nov 2017 07:01  -  09 Nov 2017 07:00  --------------------------------------------------------  IN:    dexmedetomidine Infusion: 160.5 mL    Enteral Tube Flush: 225 mL    propofol Infusion: 98.4 mL  Total IN: 483.9 mL    OUT:    Indwelling Catheter - Urethral: 2455 mL  Total OUT: 2455 mL    Total NET: -1971.1 mL      09 Nov 2017 07:01  -  09 Nov 2017 16:00  --------------------------------------------------------  IN:    Enteral Tube Flush: 150 mL    Jevity: 300 mL    propofol Infusion: 91.8 mL    Solution: 100 mL    Solution: 300 mL  Total IN: 941.8 mL    OUT:    Indwelling Catheter - Urethral: 555 mL  Total OUT: 555 mL    Total NET: 386.8 mL            LABS:                        11.8   12.3  )-----------( 195      ( 09 Nov 2017 06:29 )             35.5     11-09    139  |  99  |  18.0  ----------------------------<  94  3.7   |  26.0  |  0.63    Ca    8.6      09 Nov 2017 06:29  Phos  3.0     11-09  Mg     2.0     11-09            CAPILLARY BLOOD GLUCOSE            CULTURES:      Physical Examination:    General: No acute distress.      HEENT: Pupils equal, reactive to light.  Symmetric.    PULM: Clear to auscultation bilaterally, no significant sputum production    CVS: irregular (AF)    ABD: Soft, nondistended, nontender, normoactive bowel sounds, no masses    EXT: No edema, nontender, L BKA    SKIN: Warm and well perfused, no rashes noted.    NEURO: easily aroused, follows some simple commands, nonfocal    RADIOLOGY: < from: Xray Chest 1 View AP/PA. (11.08.17 @ 23:40) >  INTERPRETATION:  Portable chest radiograph        CLINICAL INFORMATION: ET tube placement.    TECHNIQUE:  Portable  AP view of the chest was obtained.    COMPARISON: 11/8/2017 taken 11:42 AM available for review.    FINDINGS:  ET tube tip above tracheal bifurcation.  NG tube tip beyond GE junction.    The lungs  are clear.  No pleural abnormality is seen.         The  heart is enlarged in transverse diameter. No hilar mass. Trachea   midline.        Visualized osseous structures are intact.        IMPRESSION:   No evidence of active chest disease.          ET tube tip above tracheal bifurcation.  NG tube tip beyond GE junction.    < end of copied text >      CRITICAL CARE TIME SPENT: 60min

## 2017-11-09 NOTE — PROGRESS NOTE ADULT - PROBLEM SELECTOR PLAN 1
Remains intubated for airway protection. able to tolerate some SBTs but becomes agitated and bites through endotracheal tubes and has required several reintubations, to optimize pulm toilet and de-escalate need for sedatives pt will require tracheostomy.

## 2017-11-09 NOTE — AIRWAY PLACEMENT NOTE ADULT - AIRWAY COMMENTS:
ETT exchanged by Critical care MERRILL Forrest.  Respiratory and nursing at bedside.  No occurrences noted.  O2 saturations levels maintained at 98%, Patient did not desaturate pre and post procedure.
7.5 ETT leaking cuff.   Tube exchanged with 7.5 ETT.  no complications noted

## 2017-11-09 NOTE — PROGRESS NOTE ADULT - SUBJECTIVE AND OBJECTIVE BOX
Patient is a 62y old  Male who presents with a chief complaint of Shortness of breath (30 Oct 2017 15:03)    PAST MEDICAL & SURGICAL HISTORY:  Falls  Meningitis  Collapsed lung  Alcohol withdrawal  Emphysema of lung  ETOH abuse  Cirrhosis  Afib  CHF (congestive heart failure)  Poor historian  Alcohol abuse  Chronic atrial fibrillation  S/P BKA (below knee amputation), left: secondary to worsening infection in lower leg. Had both ankle injuries from fall s/p repair - left had complication.  S/P BKA (below knee amputation) unilateral, left    CLATUS CARUANA   62y    Male    BRIEF HOSPITAL COURSE:    61 y/o M with a h/o COPD, a-fib (no a/c), CHF, alcoholic cirrhosis, EtOH abuse, EtOH withdrawal (intubated in past for airway protection), initially admitted on 10/28 for COPD exacerbation and alcohol intoxication, signed out AMA and was found later on in hospital disoriented. Readmitted and RRT called later on when patient became tremulous, agitated, delirious, and began hallucinating. Transferred to MICU for further management with benzos and precedex gtt. Patient became obtunded, lost ability to protect airway, and was subsequently intubated.   Has had multiple episodes of recurrent resp failure due to AMS secretion management issues,    Required reintubation yesterday via tube exchanger due  balloon rupture    ICU Vital Signs Last 24 Hrs  T(C): 37.7 (09 Nov 2017 20:00), Max: 38.3 (09 Nov 2017 08:00)  T(F): 99.8 (09 Nov 2017 20:00), Max: 100.9 (09 Nov 2017 08:00)  HR: 109 (09 Nov 2017 20:09) (95 - 129)  BP: 117/57 (09 Nov 2017 20:00) (76/45 - 143/69)  BP(mean): 81 (09 Nov 2017 20:00) (56 - 99)  ABP: --  ABP(mean): --  RR: 17 (09 Nov 2017 20:00) (12 - 35)  SpO2: 95% (09 Nov 2017 20:09) (93% - 98%)    Physical Examination:    General: sedate on vent    HEENT:  no JVD     PULM: bilateral BS     CVS: ireg/ireg  tachy     ABD: soft NT    EXT:  L BKA    SKIN:  warm    Neuro: sedate on propofol      Mode: AC/ CMV (Assist Control/ Continuous Mandatory Ventilation)  RR (machine): 12  TV (machine): 480  FiO2: 30  PEEP: 5  MAP: 11  PIP: 36    Mode: AC/ CMV (Assist Control/ Continuous Mandatory Ventilation), RR (machine): 12, TV (machine): 480, FiO2: 30, PEEP: 5, MAP: 11, PIP: 36  LABS:                        11.8   12.3  )-----------( 195      ( 09 Nov 2017 06:29 )             35.5     11-09    139  |  99  |  18.0  ----------------------------<  94  3.7   |  26.0  |  0.63    Ca    8.6      09 Nov 2017 06:29  Phos  3.0     11-09  Mg     2.0     11-09      CAPILLARY BLOOD GLUCOSE      CULTURES:  Culture Results:   Rare Gram Negative Rods Identification and susceptibility to follow.  Rare Routine respiratory raymundo present  Culture in progress (11-08 @ 15:50)      Medications:  aztreonam  IVPB 2000 milliGRAM(s) IV Intermittent every 8 hours  furosemide    Tablet 20 milliGRAM(s) Oral daily  lisinopril 5 milliGRAM(s) Oral daily  metoprolol     tartrate 50 milliGRAM(s) Oral every 8 hours  tamsulosin 0.4 milliGRAM(s) Oral at bedtime  ALBUTerol    0.083% 2.5 milliGRAM(s) Nebulizer every 4 hours PRN  ALBUTerol/ipratropium for Nebulization 3 milliLiter(s) Nebulizer every 6 hours  acetaminophen    Suspension 650 milliGRAM(s) Oral every 6 hours PRN  acetaminophen    Suspension 650 milliGRAM(s) Oral every 6 hours PRN  ondansetron Injectable 8 milliGRAM(s) IV Push every 6 hours PRN  propofol Infusion 20 MICROgram(s)/kG/Min IV Continuous <Continuous>  scopolamine   Patch 1.5 milliGRAM(s) Transdermal every 3 days  traZODone 50 milliGRAM(s) Oral at bedtime  aspirin  chewable 81 milliGRAM(s) Oral daily  enoxaparin Injectable 40 milliGRAM(s) SubCutaneous daily  bisacodyl Suppository 10 milliGRAM(s) Rectal daily PRN  lactulose Syrup 10 Gram(s) Oral every 12 hours  pantoprazole  Injectable 40 milliGRAM(s) IV Push daily  folic acid 1 milliGRAM(s) Oral daily  multivitamin 1 Tablet(s) Oral daily  influenza   Vaccine 0.5 milliLiter(s) IntraMuscular once  chlorhexidine 0.12% Liquid 15 milliLiter(s) Swish and Spit two times a day  nicotine - 21 mG/24Hr(s) Patch 1 patch Transdermal daily      11-08 @ 07:01  -  11-09 @ 07:00  --------------------------------------------------------  IN: 483.9 mL / OUT: 2455 mL / NET: -1971.1 mL    11-09 @ 07:01  -  11-09 @ 21:58  --------------------------------------------------------  IN: 1425 mL / OUT: 780 mL / NET: 645 mL      RADIOLOGY/IMAGING/ECHO    < from: Xray Chest 1 View AP/PA. (11.08.17 @ 23:40) >  IMPRESSION:   No evidence of active chest disease.          ET tube tip above tracheal bifurcation.  NG tube tip beyond GE junction.      Critical care point of care ultrasound:  A line predominant     Assessment/Plan:    62 years old male with PMH of A. Fib (not on AC due to falls), CHF, COPD and Alcoholism.  In ICU for severe ETOH withdrawal.  Multiple episodes of recurrent respiratory failure after extubation due to AMS and secretion issues.      Neuro         Aspiration pneumonitis  Afib with RVR  Trach tomorrow.    Minutes of Critical Care time: 36  (Reviewing data, imaging, discussing with multidisciplinary team, non inclusive of procedures, discussing goals of care with patient/family) Patient is a 62y old  Male who presents with a chief complaint of Shortness of breath (30 Oct 2017 15:03)    PAST MEDICAL & SURGICAL HISTORY:  Falls  Meningitis  Collapsed lung  Alcohol withdrawal  Emphysema of lung  ETOH abuse  Cirrhosis  Afib  CHF (congestive heart failure)  Poor historian  Alcohol abuse  Chronic atrial fibrillation  S/P BKA (below knee amputation), left: secondary to worsening infection in lower leg. Had both ankle injuries from fall s/p repair - left had complication.  S/P BKA (below knee amputation) unilateral, left    CLATUS CARUANA   62y    Male    BRIEF HOSPITAL COURSE:    63 y/o M with a h/o COPD, a-fib (no a/c), CHF, alcoholic cirrhosis, EtOH abuse, EtOH withdrawal (intubated in past for airway protection), initially admitted on 10/28 for COPD exacerbation and alcohol intoxication, signed out AMA and was found later on in hospital disoriented. Readmitted and RRT called later on when patient became tremulous, agitated, delirious, and began hallucinating. Transferred to MICU for further management with benzos and precedex gtt. Patient became obtunded, lost ability to protect airway, and was subsequently intubated.   Has had multiple episodes of recurrent resp failure due to AMS secretion management issues,    Required reintubation yesterday via tube exchanger due  balloon rupture    ICU Vital Signs Last 24 Hrs  T(C): 37.7 (09 Nov 2017 20:00), Max: 38.3 (09 Nov 2017 08:00)  T(F): 99.8 (09 Nov 2017 20:00), Max: 100.9 (09 Nov 2017 08:00)  HR: 109 (09 Nov 2017 20:09) (95 - 129)  BP: 117/57 (09 Nov 2017 20:00) (76/45 - 143/69)  BP(mean): 81 (09 Nov 2017 20:00) (56 - 99)  ABP: --  ABP(mean): --  RR: 17 (09 Nov 2017 20:00) (12 - 35)  SpO2: 95% (09 Nov 2017 20:09) (93% - 98%)    Physical Examination:    General: sedate on vent    HEENT:  no JVD     PULM: bilateral BS     CVS: ireg/ireg  tachy     ABD: soft NT    EXT:  L BKA    SKIN:  warm    Neuro: sedate on propofol      Mode: AC/ CMV (Assist Control/ Continuous Mandatory Ventilation)  RR (machine): 12  TV (machine): 480  FiO2: 30  PEEP: 5  MAP: 11  PIP: 36    Mode: AC/ CMV (Assist Control/ Continuous Mandatory Ventilation), RR (machine): 12, TV (machine): 480, FiO2: 30, PEEP: 5, MAP: 11, PIP: 36  LABS:                        11.8   12.3  )-----------( 195      ( 09 Nov 2017 06:29 )             35.5     11-09    139  |  99  |  18.0  ----------------------------<  94  3.7   |  26.0  |  0.63    Ca    8.6      09 Nov 2017 06:29  Phos  3.0     11-09  Mg     2.0     11-09      CAPILLARY BLOOD GLUCOSE      CULTURES:  Culture Results:   Rare Gram Negative Rods Identification and susceptibility to follow.  Rare Routine respiratory raymundo present  Culture in progress (11-08 @ 15:50)      Medications:  aztreonam  IVPB 2000 milliGRAM(s) IV Intermittent every 8 hours  furosemide    Tablet 20 milliGRAM(s) Oral daily  lisinopril 5 milliGRAM(s) Oral daily  metoprolol     tartrate 50 milliGRAM(s) Oral every 8 hours  tamsulosin 0.4 milliGRAM(s) Oral at bedtime  ALBUTerol    0.083% 2.5 milliGRAM(s) Nebulizer every 4 hours PRN  ALBUTerol/ipratropium for Nebulization 3 milliLiter(s) Nebulizer every 6 hours  acetaminophen    Suspension 650 milliGRAM(s) Oral every 6 hours PRN  acetaminophen    Suspension 650 milliGRAM(s) Oral every 6 hours PRN  ondansetron Injectable 8 milliGRAM(s) IV Push every 6 hours PRN  propofol Infusion 20 MICROgram(s)/kG/Min IV Continuous <Continuous>  scopolamine   Patch 1.5 milliGRAM(s) Transdermal every 3 days  traZODone 50 milliGRAM(s) Oral at bedtime  aspirin  chewable 81 milliGRAM(s) Oral daily  enoxaparin Injectable 40 milliGRAM(s) SubCutaneous daily  bisacodyl Suppository 10 milliGRAM(s) Rectal daily PRN  lactulose Syrup 10 Gram(s) Oral every 12 hours  pantoprazole  Injectable 40 milliGRAM(s) IV Push daily  folic acid 1 milliGRAM(s) Oral daily  multivitamin 1 Tablet(s) Oral daily  influenza   Vaccine 0.5 milliLiter(s) IntraMuscular once  chlorhexidine 0.12% Liquid 15 milliLiter(s) Swish and Spit two times a day  nicotine - 21 mG/24Hr(s) Patch 1 patch Transdermal daily      11-08 @ 07:01  -  11-09 @ 07:00  --------------------------------------------------------  IN: 483.9 mL / OUT: 2455 mL / NET: -1971.1 mL    11-09 @ 07:01  -  11-09 @ 21:58  --------------------------------------------------------  IN: 1425 mL / OUT: 780 mL / NET: 645 mL      RADIOLOGY/IMAGING/ECHO    < from: Xray Chest 1 View AP/PA. (11.08.17 @ 23:40) >  IMPRESSION:   No evidence of active chest disease.          ET tube tip above tracheal bifurcation.  NG tube tip beyond GE junction.      < from: TTE Echo Complete w/Doppler (11.06.17 @ 10:18) >   1. Severely increased left ventricular internal cavity size.   2. Normal global left ventricular systolic function. Left ventricular   ejection fraction, by visual estimation, is 55 to 60%.   3. Severely dilated right atrium.   4. Severely enlarged left atrium.   5. There is anterior mitral leaflet prolapse. There is moderate to   severe posteriorly directed MR.   6. There is no evidence of pericardial effusion.   7. ** Compared to TTE dated 10/31/17, no significant changes.      Critical care point of care ultrasound:  A line predominant IVC not dilated.      Assessment/Plan:    62 years old male with PMH of A. Fib (not on AC due to falls), CHF, COPD and Alcoholism.  In ICU for severe ETOH withdrawal.  Multiple episodes of recurrent respiratory failure after extubation due to AMS and secretion issues.  Aspiration pneumonitis  Afib with RVR    Trach tomorrow.        Neuro Sedate with propofol high risk for self extubation  Cor  chronic a fib no AC   HR 10 1 teens  Lopressor 50 q8.    Has Normal EF but mod to severe MR  Pulm:  Trach in AM   SBT post trach  GI NPO for Now.  ?? if peg needed, will see.  PPI  Renal Lytes OK no fulid overload on exam.  No B lines on Lung US.  Daily lasix    Heme:  Lovenox DVT P  no full AC due to fall risk  ID   low grade temps sputum with rare GNR  on Azactam  day 2  CXR without infiltrate.  Blood cultures if spikes.          Minutes of Critical Care time: 36  (Reviewing data, imaging, discussing with multidisciplinary team, non inclusive of procedures, discussing goals of care with patient/family)

## 2017-11-09 NOTE — AIRWAY PLACEMENT NOTE ADULT - DISPOSITION AFTER INTUBATION:
patient placed on mechanical ventilation

## 2017-11-09 NOTE — PROGRESS NOTE ADULT - SUBJECTIVE AND OBJECTIVE BOX
The patient is a 62y old  male who presents with a chief complaint of Shortness of breath. He   is presently intubated in the MICU.   (30 Oct 2017 15:03)    PAST MEDICAL HISTORY:  He is presently intubated in the MICU, vent settings are PEEP - 5, FIO2 = 30%. T.V.= 480 ml    Respiratory Rate = 12, assist control.  On CPAP Trials his a-fib goes to 180 b/min  Falls  Meningitis  Collapsed lung  Emphysema   Cirrhosis  Afib  CHF (congestive heart failure)  Poor historian  Alcohol abuse    PAST SURGICAL HISTORY:  Left below the knee amputation    MEDICATIONS  (STANDING):  ALBUTerol/ipratropium for Nebulization 3 milliLiter(s) Nebulizer every 6 hours  aspirin  chewable 81 milliGRAM(s) Oral daily  aztreonam  IVPB 2000 milliGRAM(s) IV Intermittent every 8 hours  chlorhexidine 0.12% Liquid 15 milliLiter(s) Swish and Spit two times a day  enoxaparin Injectable 40 milliGRAM(s) SubCutaneous daily  folic acid 1 milliGRAM(s) Oral daily  furosemide    Tablet 20 milliGRAM(s) Oral daily  influenza   Vaccine 0.5 milliLiter(s) IntraMuscular once  lactulose Syrup 10 Gram(s) Oral every 12 hours  lisinopril 5 milliGRAM(s) Oral daily  metoprolol     tartrate 50 milliGRAM(s) Oral every 8 hours  multivitamin 1 Tablet(s) Oral daily  nicotine - 21 mG/24Hr(s) Patch 1 patch Transdermal daily  pantoprazole  Injectable 40 milliGRAM(s) IV Push daily  propofol Infusion 20 MICROgram(s)/kG/Min (12.516 mL/Hr) IV Continuous <Continuous>  scopolamine   Patch 1.5 milliGRAM(s) Transdermal every 3 days  tamsulosin 0.4 milliGRAM(s) Oral at bedtime  traZODone 50 milliGRAM(s) Oral at bedtime    MEDICATIONS  (PRN):  acetaminophen    Suspension 650 milliGRAM(s) Oral every 6 hours PRN For Temp greater than 38 C (100.4 F)  acetaminophen    Suspension 650 milliGRAM(s) Oral every 6 hours PRN For Temp greater than 38 C (100.4 F)  ALBUTerol    0.083% 2.5 milliGRAM(s) Nebulizer every 4 hours PRN Shortness of Breath and/or Wheezing  bisacodyl Suppository 10 milliGRAM(s) Rectal daily PRN Constipation  ondansetron Injectable 8 milliGRAM(s) IV Push every 6 hours PRN Vomiting      Allergies:    Ceclor (Rash)      SOCIAL HISTORY:    The patient is an alcoholic.  Smoking and drug use is unknown.                          11.8   12.3  )-----------( 195      ( 09 Nov 2017 06:29 )             35.5       PT/INR - ( 27 Oct 2017 15:47 )   PT: 11.9 sec;   INR: 1.08 ratio         PTT - ( 27 Oct 2017 15:47 )  PTT:33.3 sec    11-09    139  |  99  |  18.0  ----------------------------<  94  3.7   |  26.0  |  0.63    Ca    8.6      09 Nov 2017 06:29  Phos  3.0     11-09  Mg     2.0     11-09    EKG:   10/29/2017  Atrial fibrillation with rapid ventricular response  Left posterior fascicular block  Possible Inferior infarct , age undetermined  Anteroseptal infarct , age undetermined  Abnormal ECG    TT ECHO:    11/6/2017   Summary:   1. Severely increased left ventricular internal cavity size.   2. Normal global left ventricular systolic function. Left ventricular        ejection fraction, by visual estimation, is 55 to 60%.   3. Severely dilated right atrium.   4. Severely enlarged left atrium.   5. There is anterior mitral leaflet prolapse. There is moderate to        severe posteriorly directed MR.   6. There is no evidence of pericardial effusion.    CHEST X-RAY  11/8/2017  The lungs  are clear.         The  heart is enlarged in transverse diameter.   IMPRESSION:   No evidence of active chest disease.          ASA # =  4  Mallampati # = unknown        He has 2 daughters  Naomy [phone] lives nearby                                                                                               Darling Boone [phone]  lives in  [facility] The patient is a 62y old  male who presents with a chief complaint of Shortness of breath. He   is presently intubated in the MICU.  He is scheduled to have a FLEXIBLE BRONCHOSCOPY,   REDO TRACHEOSTOMY.  (30 Oct 2017 15:03)    PAST MEDICAL HISTORY:  He is presently intubated in the MICU, vent settings are PEEP - 5, FIO2 = 30%. T.V.= 480 ml    Respiratory Rate = 12, assist control.  On CPAP Trials his a-fib goes to 180 b/min  Falls  Meningitis  Collapsed lung  Emphysema   Cirrhosis  Afib  CHF (congestive heart failure)  Poor historian  Alcohol abuse    PAST SURGICAL HISTORY:  Left below the knee amputation    MEDICATIONS  (STANDING):  ALBUTerol/ipratropium for Nebulization 3 milliLiter(s) Nebulizer every 6 hours  aspirin  chewable 81 milliGRAM(s) Oral daily  aztreonam  IVPB 2000 milliGRAM(s) IV Intermittent every 8 hours  chlorhexidine 0.12% Liquid 15 milliLiter(s) Swish and Spit two times a day  enoxaparin Injectable 40 milliGRAM(s) SubCutaneous daily  folic acid 1 milliGRAM(s) Oral daily  furosemide    Tablet 20 milliGRAM(s) Oral daily  influenza   Vaccine 0.5 milliLiter(s) IntraMuscular once  lactulose Syrup 10 Gram(s) Oral every 12 hours  lisinopril 5 milliGRAM(s) Oral daily  metoprolol     tartrate 50 milliGRAM(s) Oral every 8 hours  multivitamin 1 Tablet(s) Oral daily  nicotine - 21 mG/24Hr(s) Patch 1 patch Transdermal daily  pantoprazole  Injectable 40 milliGRAM(s) IV Push daily  propofol Infusion 20 MICROgram(s)/kG/Min (12.516 mL/Hr) IV Continuous <Continuous>  scopolamine   Patch 1.5 milliGRAM(s) Transdermal every 3 days  tamsulosin 0.4 milliGRAM(s) Oral at bedtime  traZODone 50 milliGRAM(s) Oral at bedtime    MEDICATIONS  (PRN):  acetaminophen    Suspension 650 milliGRAM(s) Oral every 6 hours PRN For Temp greater than 38 C (100.4 F)  acetaminophen    Suspension 650 milliGRAM(s) Oral every 6 hours PRN For Temp greater than 38 C (100.4 F)  ALBUTerol    0.083% 2.5 milliGRAM(s) Nebulizer every 4 hours PRN Shortness of Breath and/or Wheezing  bisacodyl Suppository 10 milliGRAM(s) Rectal daily PRN Constipation  ondansetron Injectable 8 milliGRAM(s) IV Push every 6 hours PRN Vomiting      Allergies:    Ceclor (Rash)      SOCIAL HISTORY:    The patient is an alcoholic.  Smoking and drug use is unknown.                          11.8   12.3  )-----------( 195      ( 09 Nov 2017 06:29 )             35.5       PT/INR - ( 27 Oct 2017 15:47 )   PT: 11.9 sec;   INR: 1.08 ratio         PTT - ( 27 Oct 2017 15:47 )  PTT:33.3 sec    11-09    139  |  99  |  18.0  ----------------------------<  94  3.7   |  26.0  |  0.63    Ca    8.6      09 Nov 2017 06:29  Phos  3.0     11-09  Mg     2.0     11-09    EKG:   10/29/2017  Atrial fibrillation with rapid ventricular response  Left posterior fascicular block  Possible Inferior infarct , age undetermined  Anteroseptal infarct , age undetermined  Abnormal ECG    TT ECHO:    11/6/2017   Summary:   1. Severely increased left ventricular internal cavity size.   2. Normal global left ventricular systolic function. Left ventricular        ejection fraction, by visual estimation, is 55 to 60%.   3. Severely dilated right atrium.   4. Severely enlarged left atrium.   5. There is anterior mitral leaflet prolapse. There is moderate to        severe posteriorly directed MR.   6. There is no evidence of pericardial effusion.    CHEST X-RAY  11/8/2017  The lungs  are clear.         The  heart is enlarged in transverse diameter.   IMPRESSION:   No evidence of active chest disease.          ASA # =  4  Mallampati # = unknown        He has 2 daughters  Naomy [phone] lives nearby                                                                                               Darling Boone [phone]  lives in  [facility]

## 2017-11-10 ENCOUNTER — APPOINTMENT (OUTPATIENT)
Dept: THORACIC SURGERY | Facility: HOSPITAL | Age: 62
End: 2017-11-10

## 2017-11-10 ENCOUNTER — TRANSCRIPTION ENCOUNTER (OUTPATIENT)
Age: 62
End: 2017-11-10

## 2017-11-10 PROBLEM — Z78.9 OTHER SPECIFIED HEALTH STATUS: Chronic | Status: ACTIVE | Noted: 2017-10-27

## 2017-11-10 LAB
-  AMIKACIN: SIGNIFICANT CHANGE UP
-  AZTREONAM: SIGNIFICANT CHANGE UP
-  CEFEPIME: SIGNIFICANT CHANGE UP
-  CEFTAZIDIME: SIGNIFICANT CHANGE UP
-  CIPROFLOXACIN: SIGNIFICANT CHANGE UP
-  GENTAMICIN: SIGNIFICANT CHANGE UP
-  IMIPENEM: SIGNIFICANT CHANGE UP
-  LEVOFLOXACIN: SIGNIFICANT CHANGE UP
-  MEROPENEM: SIGNIFICANT CHANGE UP
-  PIPERACILLIN/TAZOBACTAM: SIGNIFICANT CHANGE UP
-  TOBRAMYCIN: SIGNIFICANT CHANGE UP
ANION GAP SERPL CALC-SCNC: 9 MMOL/L — SIGNIFICANT CHANGE UP (ref 5–17)
APTT BLD: 31.6 SEC — SIGNIFICANT CHANGE UP (ref 27.5–37.4)
BLD GP AB SCN SERPL QL: SIGNIFICANT CHANGE UP
BUN SERPL-MCNC: 23 MG/DL — HIGH (ref 8–20)
CALCIUM SERPL-MCNC: 9.2 MG/DL — SIGNIFICANT CHANGE UP (ref 8.6–10.2)
CHLORIDE SERPL-SCNC: 104 MMOL/L — SIGNIFICANT CHANGE UP (ref 98–107)
CO2 SERPL-SCNC: 30 MMOL/L — HIGH (ref 22–29)
CREAT SERPL-MCNC: 0.76 MG/DL — SIGNIFICANT CHANGE UP (ref 0.5–1.3)
CULTURE RESULTS: NO GROWTH — SIGNIFICANT CHANGE UP
CULTURE RESULTS: SIGNIFICANT CHANGE UP
GLUCOSE SERPL-MCNC: 114 MG/DL — SIGNIFICANT CHANGE UP (ref 70–115)
HCT VFR BLD CALC: 39 % — LOW (ref 42–52)
HGB BLD-MCNC: 12.4 G/DL — LOW (ref 14–18)
INR BLD: 1.21 RATIO — HIGH (ref 0.88–1.16)
MAGNESIUM SERPL-MCNC: 2.1 MG/DL — SIGNIFICANT CHANGE UP (ref 1.6–2.6)
MCHC RBC-ENTMCNC: 30.1 PG — SIGNIFICANT CHANGE UP (ref 27–31)
MCHC RBC-ENTMCNC: 31.8 G/DL — LOW (ref 32–36)
MCV RBC AUTO: 94.7 FL — HIGH (ref 80–94)
METHOD TYPE: SIGNIFICANT CHANGE UP
ORGANISM # SPEC MICROSCOPIC CNT: SIGNIFICANT CHANGE UP
ORGANISM # SPEC MICROSCOPIC CNT: SIGNIFICANT CHANGE UP
PHOSPHATE SERPL-MCNC: 3.3 MG/DL — SIGNIFICANT CHANGE UP (ref 2.4–4.7)
PLATELET # BLD AUTO: 246 K/UL — SIGNIFICANT CHANGE UP (ref 150–400)
POTASSIUM SERPL-MCNC: 4.2 MMOL/L — SIGNIFICANT CHANGE UP (ref 3.5–5.3)
POTASSIUM SERPL-SCNC: 4.2 MMOL/L — SIGNIFICANT CHANGE UP (ref 3.5–5.3)
PROTHROM AB SERPL-ACNC: 13.4 SEC — HIGH (ref 9.8–12.7)
RBC # BLD: 4.12 M/UL — LOW (ref 4.6–6.2)
RBC # FLD: 16.6 % — HIGH (ref 11–15.6)
SODIUM SERPL-SCNC: 143 MMOL/L — SIGNIFICANT CHANGE UP (ref 135–145)
SPECIMEN SOURCE: SIGNIFICANT CHANGE UP
SPECIMEN SOURCE: SIGNIFICANT CHANGE UP
TRIGL SERPL-MCNC: 79 MG/DL — SIGNIFICANT CHANGE UP (ref 10–200)
TYPE + AB SCN PNL BLD: SIGNIFICANT CHANGE UP
WBC # BLD: 12 K/UL — HIGH (ref 4.8–10.8)
WBC # FLD AUTO: 12 K/UL — HIGH (ref 4.8–10.8)

## 2017-11-10 PROCEDURE — 99291 CRITICAL CARE FIRST HOUR: CPT

## 2017-11-10 PROCEDURE — 71010: CPT | Mod: 26

## 2017-11-10 PROCEDURE — 31600 PLANNED TRACHEOSTOMY: CPT

## 2017-11-10 PROCEDURE — 31624 DX BRONCHOSCOPE/LAVAGE: CPT

## 2017-11-10 RX ADMIN — LACTULOSE 10 GRAM(S): 10 SOLUTION ORAL at 17:56

## 2017-11-10 RX ADMIN — CHLORHEXIDINE GLUCONATE 15 MILLILITER(S): 213 SOLUTION TOPICAL at 06:18

## 2017-11-10 RX ADMIN — CHLORHEXIDINE GLUCONATE 15 MILLILITER(S): 213 SOLUTION TOPICAL at 17:56

## 2017-11-10 RX ADMIN — Medication 3 MILLILITER(S): at 08:22

## 2017-11-10 RX ADMIN — PROPOFOL 12.52 MICROGRAM(S)/KG/MIN: 10 INJECTION, EMULSION INTRAVENOUS at 06:19

## 2017-11-10 RX ADMIN — Medication 50 MILLIGRAM(S): at 06:19

## 2017-11-10 RX ADMIN — Medication 100 MILLIGRAM(S): at 06:20

## 2017-11-10 RX ADMIN — Medication 1 TABLET(S): at 15:19

## 2017-11-10 RX ADMIN — Medication 3 MILLILITER(S): at 15:10

## 2017-11-10 RX ADMIN — Medication 3 MILLILITER(S): at 20:30

## 2017-11-10 RX ADMIN — Medication 1 PATCH: at 15:19

## 2017-11-10 RX ADMIN — LISINOPRIL 5 MILLIGRAM(S): 2.5 TABLET ORAL at 06:19

## 2017-11-10 RX ADMIN — Medication 3 MILLILITER(S): at 02:48

## 2017-11-10 RX ADMIN — Medication 1 PATCH: at 15:20

## 2017-11-10 RX ADMIN — LACTULOSE 10 GRAM(S): 10 SOLUTION ORAL at 06:19

## 2017-11-10 RX ADMIN — Medication 81 MILLIGRAM(S): at 15:19

## 2017-11-10 RX ADMIN — Medication 1 MILLIGRAM(S): at 15:19

## 2017-11-10 RX ADMIN — Medication 50 MILLIGRAM(S): at 21:54

## 2017-11-10 RX ADMIN — TAMSULOSIN HYDROCHLORIDE 0.4 MILLIGRAM(S): 0.4 CAPSULE ORAL at 21:54

## 2017-11-10 RX ADMIN — PANTOPRAZOLE SODIUM 40 MILLIGRAM(S): 20 TABLET, DELAYED RELEASE ORAL at 15:20

## 2017-11-10 RX ADMIN — Medication 50 MILLIGRAM(S): at 15:19

## 2017-11-10 RX ADMIN — Medication 20 MILLIGRAM(S): at 06:19

## 2017-11-10 NOTE — PROGRESS NOTE ADULT - ATTENDING COMMENTS
Pt remains critically ill on ventilatory support with delirium and agitation. Both daughters updated today.  Goal to wake and wean pt from all sedative, psychotropic meds and then wean vent as able.

## 2017-11-10 NOTE — CHART NOTE - NSCHARTNOTEFT_GEN_A_CORE
Source: Patient [ ]  Family [ ]   other [ X] EMR    Current Diet: NPO with enteral feeds    Patient reports [ ] nausea  [ ] vomiting [ ] diarrhea [ ] constipation  [ ]chewing problems [ ] swallowing issues  [ ] other:     PO intake:  < 50% [ ]   50-75%  [ ]   %  [ ]  other :    Source for PO intake [ ] Patient [ ] family [ ] chart [ ] staff [ ] other    Enteral /Parenteral Nutrition: Glucerna 1.5cal at 60ml/hr x 24 hours via NGT currently on hold. Plan for trach placement today noted.    Current Weight: (11/09) 102kg    % Weight Change     Pertinent Medications: MEDICATIONS  (STANDING):  ALBUTerol/ipratropium for Nebulization 3 milliLiter(s) Nebulizer every 6 hours  aspirin  chewable 81 milliGRAM(s) Oral daily  chlorhexidine 0.12% Liquid 15 milliLiter(s) Swish and Spit two times a day  enoxaparin Injectable 40 milliGRAM(s) SubCutaneous daily  folic acid 1 milliGRAM(s) Oral daily  furosemide    Tablet 20 milliGRAM(s) Oral daily  influenza   Vaccine 0.5 milliLiter(s) IntraMuscular once  lactulose Syrup 10 Gram(s) Oral every 12 hours  lisinopril 5 milliGRAM(s) Oral daily  metoprolol     tartrate 50 milliGRAM(s) Oral every 8 hours  multivitamin 1 Tablet(s) Oral daily  nicotine - 21 mG/24Hr(s) Patch 1 patch Transdermal daily  pantoprazole  Injectable 40 milliGRAM(s) IV Push daily  propofol Infusion 20 MICROgram(s)/kG/Min (12.516 mL/Hr) IV Continuous <Continuous>  scopolamine   Patch 1.5 milliGRAM(s) Transdermal every 3 days  tamsulosin 0.4 milliGRAM(s) Oral at bedtime  traZODone 50 milliGRAM(s) Oral at bedtime    MEDICATIONS  (PRN):  acetaminophen    Suspension 650 milliGRAM(s) Oral every 6 hours PRN For Temp greater than 38 C (100.4 F)  acetaminophen    Suspension 650 milliGRAM(s) Oral every 6 hours PRN For Temp greater than 38 C (100.4 F)  ALBUTerol    0.083% 2.5 milliGRAM(s) Nebulizer every 4 hours PRN Shortness of Breath and/or Wheezing  bisacodyl Suppository 10 milliGRAM(s) Rectal daily PRN Constipation  ondansetron Injectable 8 milliGRAM(s) IV Push every 6 hours PRN Vomiting    Pertinent Labs: CBC Full  -  ( 10 Nov 2017 07:13 )  WBC Count : 12.0 K/uL  Hemoglobin : 12.4 g/dL  Hematocrit : 39.0 %    Skin: No breakdown noted.    Nutrition focused physical exam conducted - found signs of malnutrition [ ]absent [ ]present    Subcutaneous fat loss: [ ] Orbital fat pads region, [ ]Buccal fat region, [ ]Triceps region,  [ ]Ribs region    Muscle wasting: [ ]Temples region, [ ]Clavicle region, [ ]Shoulder region, [ ]Scapula region, [ ]Interosseous region,  [ ]thigh region, [ ]Calf region    Estimated Needs:   [ X] no change since previous assessment  [ ] recalculated:     Current Nutrition Diagnosis: Pt at nutritional risk secondary to increased nutrient needs related to ETOH withdrawal, respiratory failure, s/p trach as evidenced by increased estimated nutrient needs to optimize nutritional status.    Recommendations: Continue enteral feeds Glucerna at 70ml/hr x 20 hours (1400ml/daily) to provide 2100kcal and 116 grams protein daily.    Monitoring and Evaluation:   [ ] PO intake [ X] Tolerance to enteral feeds [X] Daily Weight  [X] Follow up per protocol [X] Labs: Monitor H/H, electrolytes.

## 2017-11-10 NOTE — PROGRESS NOTE ADULT - SUBJECTIVE AND OBJECTIVE BOX
Patient is a 62y old  Male who presents with a chief complaint of Shortness of breath (30 Oct 2017 15:03)      BRIEF HOSPITAL COURSE: 63 y/o M with a h/o COPD, a-fib (no a/c), CHF, alcoholic cirrhosis, EtOH abuse, EtOH withdrawal (intubated in past for airway protection), initially admitted on 10/28 for COPD exacerbation and alcohol intoxication, signed out AMA and was found later on in hospital disoriented. Readmitted and RRT called later on when patient became tremulous, agitated, delirious, and began hallucinating. Transferred to MICU for further management with benzos and precedex gtt. Patient became obtunded, lost ability to protect airway, and was subsequently intubated. As per report, patient is highly sensitive to benzodiazepines.  CT head neg for acute events. Course complicated by a-fib with RVR, sepsis, pseudomonas aspiration pneumonia. Self extubated on 11/2 without need for reintubation initially but on the morning of 11/4 patient obtunded  with difficulty managing secretions. Reintubated for airway protection. Pt has had recurrent ruptured  balloons and ETT breakage.    Events last 24 hours: went to OR for trach today    PAST MEDICAL & SURGICAL HISTORY:  Falls  Meningitis  Collapsed lung  Alcohol withdrawal  Emphysema of lung  ETOH abuse  Cirrhosis  Afib  CHF (congestive heart failure)  Poor historian  Alcohol abuse  Chronic atrial fibrillation  S/P BKA (below knee amputation), left: secondary to worsening infection in lower leg. Had both ankle injuries from fall s/p repair - left had complication.  S/P BKA (below knee amputation) unilateral, left      Review of Systems:  Cannot obtain pt is sedated and trached.    Medications:    furosemide    Tablet 20 milliGRAM(s) Oral daily  lisinopril 5 milliGRAM(s) Oral daily  metoprolol     tartrate 50 milliGRAM(s) Oral every 8 hours  tamsulosin 0.4 milliGRAM(s) Oral at bedtime    ALBUTerol    0.083% 2.5 milliGRAM(s) Nebulizer every 4 hours PRN  ALBUTerol/ipratropium for Nebulization 3 milliLiter(s) Nebulizer every 6 hours    acetaminophen    Suspension 650 milliGRAM(s) Oral every 6 hours PRN  acetaminophen    Suspension 650 milliGRAM(s) Oral every 6 hours PRN  ondansetron Injectable 8 milliGRAM(s) IV Push every 6 hours PRN  propofol Infusion 20 MICROgram(s)/kG/Min IV Continuous <Continuous>  scopolamine   Patch 1.5 milliGRAM(s) Transdermal every 3 days  traZODone 50 milliGRAM(s) Oral at bedtime      aspirin  chewable 81 milliGRAM(s) Oral daily  enoxaparin Injectable 40 milliGRAM(s) SubCutaneous daily    bisacodyl Suppository 10 milliGRAM(s) Rectal daily PRN  lactulose Syrup 10 Gram(s) Oral every 12 hours  pantoprazole  Injectable 40 milliGRAM(s) IV Push daily        folic acid 1 milliGRAM(s) Oral daily  multivitamin 1 Tablet(s) Oral daily    influenza   Vaccine 0.5 milliLiter(s) IntraMuscular once    chlorhexidine 0.12% Liquid 15 milliLiter(s) Swish and Spit two times a day    nicotine - 21 mG/24Hr(s) Patch 1 patch Transdermal daily      Mode: AC/ CMV (Assist Control/ Continuous Mandatory Ventilation)  RR (machine): 12  TV (machine): 480  FiO2: 30  PEEP: 5  MAP: 12  PIP: 32      ICU Vital Signs Last 24 Hrs  T(C): 38.3 (10 Nov 2017 12:02), Max: 38.3 (10 Nov 2017 12:02)  T(F): 100.9 (10 Nov 2017 12:02), Max: 100.9 (10 Nov 2017 12:02)  HR: 113 (10 Nov 2017 13:00) (97 - 123)  BP: 110/59 (10 Nov 2017 13:00) (94/50 - 125/68)  BP(mean): 78 (10 Nov 2017 13:00) (65 - 94)  ABP: --  ABP(mean): --  RR: 16 (10 Nov 2017 13:00) (12 - 25)  SpO2: 94% (10 Nov 2017 13:00) (92% - 98%)          I&O's Detail    09 Nov 2017 07:01  -  10 Nov 2017 07:00  --------------------------------------------------------  IN:    Enteral Tube Flush: 410 mL    Glucerna: 360 mL    Jevity: 600 mL    propofol Infusion: 299.2 mL    Solution: 300 mL    Solution: 300 mL  Total IN: 2269.2 mL    OUT:    Indwelling Catheter - Urethral: 1425 mL  Total OUT: 1425 mL    Total NET: 844.2 mL      10 Nov 2017 07:01  -  10 Nov 2017 15:23  --------------------------------------------------------  IN:    propofol Infusion: 55.2 mL  Total IN: 55.2 mL    OUT:    Indwelling Catheter - Urethral: 500 mL  Total OUT: 500 mL    Total NET: -444.8 mL            LABS:                        12.4   12.0  )-----------( 246      ( 10 Nov 2017 07:13 )             39.0     11-10    143  |  104  |  23.0<H>  ----------------------------<  114  4.2   |  30.0<H>  |  0.76    Ca    9.2      10 Nov 2017 07:13  Phos  3.3     11-10  Mg     2.1     11-10            CAPILLARY BLOOD GLUCOSE        PT/INR - ( 10 Nov 2017 07:13 )   PT: 13.4 sec;   INR: 1.21 ratio         PTT - ( 10 Nov 2017 07:13 )  PTT:31.6 sec    CULTURES:  Culture Results:   No growth (11-08 @ 23:23)  Culture Results:   Rare Pseudomonas aeruginosa  Rare Routine respiratory raymundo present (11-08 @ 15:50)      Physical Examination:    General: No acute distress.      HEENT: Pupils equal, reactive to light.  Symmetric.  8 shiley trach in place minimal blood at insertion site    PULM: Clear to auscultation bilaterally, no significant sputum production    CVS: irregular rapid rate    ABD: Soft, nondistended, nontender, normoactive bowel sounds, no masses    EXT: No edema, nontender, L BKA    SKIN: Warm and well perfused, no rashes noted.    NEURO: sedated    RADIOLOGY:     CRITICAL CARE TIME SPENT: 60

## 2017-11-10 NOTE — PROGRESS NOTE ADULT - PROBLEM SELECTOR PLAN 5
Continue routine diuresis. HR and BP control with metoprolol which has required some dose adjustment will continue to adjust as needed

## 2017-11-10 NOTE — PROGRESS NOTE ADULT - ASSESSMENT
63 y/o M with a h/o COPD, a-fib (no a/c), CHF, alcoholic cardiomyopathy, alcoholic cirrhosis, EtOH abuse, EtOH withdrawal, acute hypoxic respiratory failure and several reintubations for encephalopathy, sepsis, aspiration pneumonia.

## 2017-11-10 NOTE — PROGRESS NOTE ADULT - SUBJECTIVE AND OBJECTIVE BOX
Patient is a 62y old  Male who presents with a chief complaint of Shortness of breath (30 Oct 2017 15:03)    PAST MEDICAL & SURGICAL HISTORY:  Falls  Meningitis  Collapsed lung  Alcohol withdrawal  Emphysema of lung  ETOH abuse  Cirrhosis  Afib  CHF (congestive heart failure)  Poor historian  Alcohol abuse  Chronic atrial fibrillation  S/P BKA (below knee amputation), left: secondary to worsening infection in lower leg. Had both ankle injuries from fall s/p repair - left had complication.  S/P BKA (below knee amputation) unilateral, left    CLATUS CARUANA   62y    Male    BRIEF HOSPITAL COURSE:    Review of Systems:                       All other ROS are negative.    ICU Vital Signs Last 24 Hrs  T(C): 37.2 (10 Nov 2017 20:00), Max: 38.3 (10 Nov 2017 12:02)  T(F): 99 (10 Nov 2017 20:00), Max: 100.9 (10 Nov 2017 12:02)  HR: 99 (10 Nov 2017 22:00) (84 - 144)  BP: 127/74 (10 Nov 2017 22:00) (87/52 - 127/74)  BP(mean): 95 (10 Nov 2017 22:00) (66 - 95)  ABP: --  ABP(mean): --  RR: 17 (10 Nov 2017 22:00) (13 - 25)  SpO2: 100% (10 Nov 2017 22:00) (92% - 100%)    Physical Examination:    General:     HEENT:     PULM:     CVS:     ABD:     EXT:     SKIN:     Neuro:      Mode: AC/ CMV (Assist Control/ Continuous Mandatory Ventilation)  RR (machine): 12  TV (machine): 480  FiO2: 30  PEEP: 5  MAP: 10  PIP: 23    Mode: AC/ CMV (Assist Control/ Continuous Mandatory Ventilation), RR (machine): 12, TV (machine): 480, FiO2: 30, PEEP: 5, MAP: 10, PIP: 23  LABS:                        12.4   12.0  )-----------( 246      ( 10 Nov 2017 07:13 )             39.0     11-10    143  |  104  |  23.0<H>  ----------------------------<  114  4.2   |  30.0<H>  |  0.76    Ca    9.2      10 Nov 2017 07:13  Phos  3.3     11-10  Mg     2.1     11-10            CAPILLARY BLOOD GLUCOSE          PT/INR - ( 10 Nov 2017 07:13 )   PT: 13.4 sec;   INR: 1.21 ratio         PTT - ( 10 Nov 2017 07:13 )  PTT:31.6 sec    CULTURES:  Culture Results:   No growth (11-08 @ 23:23)  Culture Results:   Rare Pseudomonas aeruginosa  Rare Routine respiratory raymundo present (11-08 @ 15:50)  Culture Results:   No growth at 48 hours (11-08 @ 15:48)  Culture Results:   No growth at 48 hours (11-08 @ 15:47)      Medications:    furosemide    Tablet 20 milliGRAM(s) Oral daily  lisinopril 5 milliGRAM(s) Oral daily  metoprolol     tartrate 50 milliGRAM(s) Oral every 8 hours  tamsulosin 0.4 milliGRAM(s) Oral at bedtime    ALBUTerol    0.083% 2.5 milliGRAM(s) Nebulizer every 4 hours PRN  ALBUTerol/ipratropium for Nebulization 3 milliLiter(s) Nebulizer every 6 hours    acetaminophen    Suspension 650 milliGRAM(s) Oral every 6 hours PRN  acetaminophen    Suspension 650 milliGRAM(s) Oral every 6 hours PRN  ondansetron Injectable 8 milliGRAM(s) IV Push every 6 hours PRN  propofol Infusion 20 MICROgram(s)/kG/Min IV Continuous <Continuous>  scopolamine   Patch 1.5 milliGRAM(s) Transdermal every 3 days  traZODone 50 milliGRAM(s) Oral at bedtime      aspirin  chewable 81 milliGRAM(s) Oral daily  enoxaparin Injectable 40 milliGRAM(s) SubCutaneous daily    bisacodyl Suppository 10 milliGRAM(s) Rectal daily PRN  lactulose Syrup 10 Gram(s) Oral every 12 hours  pantoprazole  Injectable 40 milliGRAM(s) IV Push daily        folic acid 1 milliGRAM(s) Oral daily  multivitamin 1 Tablet(s) Oral daily    influenza   Vaccine 0.5 milliLiter(s) IntraMuscular once    chlorhexidine 0.12% Liquid 15 milliLiter(s) Swish and Spit two times a day    nicotine - 21 mG/24Hr(s) Patch 1 patch Transdermal daily          11-09 @ 07:01  -  11-10 @ 07:00  --------------------------------------------------------  IN: 2269.2 mL / OUT: 1425 mL / NET: 844.2 mL    11-10 @ 07:01  -  11-10 @ 23:18  --------------------------------------------------------  IN: 752.8 mL / OUT: 800 mL / NET: -47.2 mL        RADIOLOGY/IMAGING/ECHO    Critical care point of care ultrasound:    Assessment/Plan:        Minutes of Critical Care time:   (Reviewing data, imaging, discussing with multidisciplinary team, non inclusive of procedures, discussing goals of care with patient/family) Patient is a 62y old  Male who presents with a chief complaint of Shortness of breath (30 Oct 2017 15:03)    PAST MEDICAL & SURGICAL HISTORY:  Falls  Meningitis  Collapsed lung  Alcohol withdrawal  Emphysema of lung  ETOH abuse  Cirrhosis  Afib  CHF (congestive heart failure)  Poor historian  Alcohol abuse  Chronic atrial fibrillation  S/P BKA (below knee amputation), left: secondary to worsening infection in lower leg. Had both ankle injuries from fall s/p repair - left had complication.  S/P BKA (below knee amputation) unilateral, left    CLATUS CARUANA   62y    Male    BRIEF HOSPITAL COURSE:  63 y/o M with a h/o COPD, a-fib (no a/c), CHF, alcoholic cirrhosis, EtOH abuse, EtOH withdrawal (intubated in past for airway protection), initially admitted on 10/28 for COPD exacerbation and alcohol intoxication, signed out AMA and was found later on in hospital disoriented. Readmitted and RRT called later on when patient became tremulous, agitated, delirious, and began hallucinating. Transferred to MICU for further management with benzos and precedex gtt. Patient became obtunded, lost ability to protect airway, and was subsequently intubated.   Has had multiple episodes of recurrent resp failure due to AMS secretion management issues,    Required reintubation 11/9 via tube exchanger due  balloon rupture.  S/P Trach 11/10      Review of Systems:    UATO      All other ROS are negative.    ICU Vital Signs Last 24 Hrs  T(C): 37.2 (10 Nov 2017 20:00), Max: 38.3 (10 Nov 2017 12:02)  T(F): 99 (10 Nov 2017 20:00), Max: 100.9 (10 Nov 2017 12:02)  HR: 99 (10 Nov 2017 22:00) (84 - 144)  BP: 127/74 (10 Nov 2017 22:00) (87/52 - 127/74)  BP(mean): 95 (10 Nov 2017 22:00) (66 - 95)  ABP: --  ABP(mean): --  RR: 17 (10 Nov 2017 22:00) (13 - 25)  SpO2: 100% (10 Nov 2017 22:00) (92% - 100%)    Physical Examination:    General:  NAD    HEENT:  Trach site clean    PULM: bilateral BS no wheeze or rhonchi     CVS:  s1 s2 irreg    ABD: soft nt    EXT:  LBKA  UE edema     SKIN:  warm    Neuro:  awake follows commands weak      Mode: AC/ CMV (Assist Control/ Continuous Mandatory Ventilation)  RR (machine): 12  TV (machine): 480  FiO2: 30  PEEP: 5  MAP: 10  PIP: 23    Mode: AC/ CMV (Assist Control/ Continuous Mandatory Ventilation), RR (machine): 12, TV (machine): 480, FiO2: 30, PEEP: 5, MAP: 10, PIP: 23  LABS:                        12.4   12.0  )-----------( 246      ( 10 Nov 2017 07:13 )             39.0     11-10    143  |  104  |  23.0<H>  ----------------------------<  114  4.2   |  30.0<H>  |  0.76    Ca    9.2      10 Nov 2017 07:13  Phos  3.3     11-10  Mg     2.1     11-10      CAPILLARY BLOOD GLUCOSE    PT/INR - ( 10 Nov 2017 07:13 )   PT: 13.4 sec;   INR: 1.21 ratio         PTT - ( 10 Nov 2017 07:13 )  PTT:31.6 sec    CULTURES:  Culture Results:   No growth (11-08 @ 23:23)  Culture Results:   Rare Pseudomonas aeruginosa  Rare Routine respiratory raymundo present (11-08 @ 15:50)  Culture Results:   No growth at 48 hours (11-08 @ 15:48)  Culture Results:   No growth at 48 hours (11-08 @ 15:47)      Medications:    furosemide    Tablet 20 milliGRAM(s) Oral daily  lisinopril 5 milliGRAM(s) Oral daily  metoprolol     tartrate 50 milliGRAM(s) Oral every 8 hours  tamsulosin 0.4 milliGRAM(s) Oral at bedtime  ALBUTerol    0.083% 2.5 milliGRAM(s) Nebulizer every 4 hours PRN  ALBUTerol/ipratropium for Nebulization 3 milliLiter(s) Nebulizer every 6 hours  acetaminophen    Suspension 650 milliGRAM(s) Oral every 6 hours PRN  acetaminophen    Suspension 650 milliGRAM(s) Oral every 6 hours PRN  ondansetron Injectable 8 milliGRAM(s) IV Push every 6 hours PRN  propofol Infusion 20 MICROgram(s)/kG/Min IV Continuous <Continuous>  scopolamine   Patch 1.5 milliGRAM(s) Transdermal every 3 days  traZODone 50 milliGRAM(s) Oral at bedtime  aspirin  chewable 81 milliGRAM(s) Oral daily  enoxaparin Injectable 40 milliGRAM(s) SubCutaneous daily  bisacodyl Suppository 10 milliGRAM(s) Rectal daily PRN  lactulose Syrup 10 Gram(s) Oral every 12 hours  pantoprazole  Injectable 40 milliGRAM(s) IV Push daily  folic acid 1 milliGRAM(s) Oral daily  multivitamin 1 Tablet(s) Oral daily  influenza   Vaccine 0.5 milliLiter(s) IntraMuscular once  chlorhexidine 0.12% Liquid 15 milliLiter(s) Swish and Spit two times a day  nicotine - 21 mG/24Hr(s) Patch 1 patch Transdermal daily    11-09 @ 07:01  -  11-10 @ 07:00  --------------------------------------------------------  IN: 2269.2 mL / OUT: 1425 mL / NET: 844.2 mL    11-10 @ 07:01  -  11-10 @ 23:18  --------------------------------------------------------  IN: 752.8 mL / OUT: 800 mL / NET: -47.2 mL        RADIOLOGY/IMAGING/ECHO    < from: Xray Chest 1 View AP/PA. (11.08.17 @ 23:40) >  MPRESSION:   No evidence of active chest disease.          ET tube tip above tracheal bifurcation.  NG tube tip beyond GE junction.      Assessment/Plan:    62 years old male with PMH of A. Fib (not on AC due to falls), CHF, COPD and Alcoholism.  In ICU for severe ETOH withdrawal.  Multiple episodes of recurrent respiratory failure after extubation due to AMS and secretion issues.  Aspiration pneumonitis  Afib with RVR  now better controlled     s/p Trach 11/10        Neuro  extremely drowsy off sedation weak  likely CIP  will hold trazadone and scopolamine as MS offenders can resume if needed later   Cor  chronic a fib no AC   HR better controlled on lopressor    Has Normal EF but mod to severe MR  Pulm:  Trach in  start weaning   GI  glucerna to goal PPI   Renal  No issues keep even balance on lasix  Heme:  Lovenox DVT P  no full AC due to fall risk  ID   low grade temps sputum with rare pseudomonas no WBC's  on Azactam  day 2  CXR  without infiltrate.  Blood cultures if spikes.          Minutes of Critical Care time: 33  (Reviewing data, imaging, discussing with multidisciplinary team, non inclusive of procedures, discussing goals of care with patient/family)

## 2017-11-11 LAB
ANION GAP SERPL CALC-SCNC: 10 MMOL/L — SIGNIFICANT CHANGE UP (ref 5–17)
BUN SERPL-MCNC: 22 MG/DL — HIGH (ref 8–20)
CALCIUM SERPL-MCNC: 8.8 MG/DL — SIGNIFICANT CHANGE UP (ref 8.6–10.2)
CHLORIDE SERPL-SCNC: 105 MMOL/L — SIGNIFICANT CHANGE UP (ref 98–107)
CO2 SERPL-SCNC: 29 MMOL/L — SIGNIFICANT CHANGE UP (ref 22–29)
CREAT SERPL-MCNC: 0.57 MG/DL — SIGNIFICANT CHANGE UP (ref 0.5–1.3)
GLUCOSE SERPL-MCNC: 116 MG/DL — HIGH (ref 70–115)
HCT VFR BLD CALC: 35.8 % — LOW (ref 42–52)
HGB BLD-MCNC: 11.4 G/DL — LOW (ref 14–18)
MAGNESIUM SERPL-MCNC: 2 MG/DL — SIGNIFICANT CHANGE UP (ref 1.6–2.6)
MCHC RBC-ENTMCNC: 30.3 PG — SIGNIFICANT CHANGE UP (ref 27–31)
MCHC RBC-ENTMCNC: 31.8 G/DL — LOW (ref 32–36)
MCV RBC AUTO: 95.2 FL — HIGH (ref 80–94)
PHOSPHATE SERPL-MCNC: 2.7 MG/DL — SIGNIFICANT CHANGE UP (ref 2.4–4.7)
PLATELET # BLD AUTO: 239 K/UL — SIGNIFICANT CHANGE UP (ref 150–400)
POTASSIUM SERPL-MCNC: 4.2 MMOL/L — SIGNIFICANT CHANGE UP (ref 3.5–5.3)
POTASSIUM SERPL-SCNC: 4.2 MMOL/L — SIGNIFICANT CHANGE UP (ref 3.5–5.3)
RBC # BLD: 3.76 M/UL — LOW (ref 4.6–6.2)
RBC # FLD: 16.6 % — HIGH (ref 11–15.6)
SODIUM SERPL-SCNC: 144 MMOL/L — SIGNIFICANT CHANGE UP (ref 135–145)
WBC # BLD: 10.8 K/UL — SIGNIFICANT CHANGE UP (ref 4.8–10.8)
WBC # FLD AUTO: 10.8 K/UL — SIGNIFICANT CHANGE UP (ref 4.8–10.8)

## 2017-11-11 PROCEDURE — 99233 SBSQ HOSP IP/OBS HIGH 50: CPT

## 2017-11-11 RX ORDER — METOPROLOL TARTRATE 50 MG
5 TABLET ORAL ONCE
Qty: 0 | Refills: 0 | Status: COMPLETED | OUTPATIENT
Start: 2017-11-11 | End: 2017-11-11

## 2017-11-11 RX ADMIN — Medication 50 MILLIGRAM(S): at 21:26

## 2017-11-11 RX ADMIN — Medication 1 PATCH: at 12:28

## 2017-11-11 RX ADMIN — Medication 3 MILLILITER(S): at 20:14

## 2017-11-11 RX ADMIN — PANTOPRAZOLE SODIUM 40 MILLIGRAM(S): 20 TABLET, DELAYED RELEASE ORAL at 11:52

## 2017-11-11 RX ADMIN — Medication 50 MILLIGRAM(S): at 13:28

## 2017-11-11 RX ADMIN — Medication 5 MILLIGRAM(S): at 22:39

## 2017-11-11 RX ADMIN — Medication 3 MILLILITER(S): at 08:27

## 2017-11-11 RX ADMIN — CHLORHEXIDINE GLUCONATE 15 MILLILITER(S): 213 SOLUTION TOPICAL at 17:57

## 2017-11-11 RX ADMIN — Medication 1 MILLIGRAM(S): at 11:53

## 2017-11-11 RX ADMIN — LACTULOSE 10 GRAM(S): 10 SOLUTION ORAL at 17:57

## 2017-11-11 RX ADMIN — Medication 81 MILLIGRAM(S): at 11:52

## 2017-11-11 RX ADMIN — ENOXAPARIN SODIUM 40 MILLIGRAM(S): 100 INJECTION SUBCUTANEOUS at 11:53

## 2017-11-11 RX ADMIN — Medication 50 MILLIGRAM(S): at 06:13

## 2017-11-11 RX ADMIN — LISINOPRIL 5 MILLIGRAM(S): 2.5 TABLET ORAL at 06:13

## 2017-11-11 RX ADMIN — Medication 20 MILLIGRAM(S): at 06:13

## 2017-11-11 RX ADMIN — TAMSULOSIN HYDROCHLORIDE 0.4 MILLIGRAM(S): 0.4 CAPSULE ORAL at 21:26

## 2017-11-11 RX ADMIN — CHLORHEXIDINE GLUCONATE 15 MILLILITER(S): 213 SOLUTION TOPICAL at 06:13

## 2017-11-11 RX ADMIN — Medication 1 PATCH: at 11:52

## 2017-11-11 RX ADMIN — Medication 650 MILLIGRAM(S): at 11:52

## 2017-11-11 RX ADMIN — Medication 650 MILLIGRAM(S): at 21:26

## 2017-11-11 RX ADMIN — LACTULOSE 10 GRAM(S): 10 SOLUTION ORAL at 06:13

## 2017-11-11 RX ADMIN — Medication 1 TABLET(S): at 11:53

## 2017-11-11 RX ADMIN — Medication 3 MILLILITER(S): at 15:19

## 2017-11-11 RX ADMIN — Medication 3 MILLILITER(S): at 03:53

## 2017-11-11 NOTE — PROGRESS NOTE ADULT - ATTENDING COMMENTS
I saw this patient independently and agree with the above.  Finished course of antibiotics for pseudomonas; s/p tracheostomy 11/10; trying trach collar today; will go back on ventilator tonight.  Pulmonary medicine consultation.  OK for transition to 6T; anticipate if can be off ventilator that swallowing evaluations can take place to address dysphagia/?PEG.  For AFRVR, no anticoagulation at this point, but may require I saw this patient independently and agree with the above.  Finished course of antibiotics for pseudomonas; s/p tracheostomy 11/10; trying trach collar today; will go back on ventilator tonight.  Pulmonary medicine consultation.  OK for transition to 6T; anticipate if can be off ventilator that swallowing evaluations can take place to address dysphagia/?PEG.  For AFRVR, no anticoagulation at this point, but may require at some point.  His encephalopathy is improving steadily but he's nowhere near baseline.  Continues to be impulsive and requiring restraints to maintain safety.

## 2017-11-11 NOTE — PROGRESS NOTE ADULT - ASSESSMENT
63 y/o M with a h/o COPD, a-fib (no a/c), CHF, alcoholic cardiomyopathy, alcoholic cirrhosis, EtOH abuse, EtOH withdrawal, acute hypoxic respiratory failure and several reintubations for encephalopathy, sepsis, aspiration pneumonia.    Please see MICU transfer summary for details of Hospital stay and MICU Course     Acute Hypoxic Respiratory failure:  Unable to wean him off of vent  Currently Trached and on vent support  Pulm consulted for vent management    A fib, persistent with RVR:  Continue Metoprolol  currently slightly hypotensive and so rate control is challenging  shall consider Cardiology eval in am, for Possible amiodarone, if Hypotension continues    Aspiration PNA :  completed Antibiotic course 	    Alcohol withdrawal related delirium  No further signs of acute withdrawal. Periods of agitation and delirium continue  Seroquel    Alcoholic cardiomyopathy :  continue Lasix metoprolol and Lisinopril  last ECHO: 06/05/2017 : LVEF : 40-45 %    DVT Prophylaxis : continue SQ Lovenox    Transfer to Hospital medicine service.  Higher level of care with respiratory failure on full vent support 63 y/o M with a h/o COPD, a-fib (no a/c), CHF, alcoholic cardiomyopathy, alcoholic cirrhosis, EtOH abuse, EtOH withdrawal, acute hypoxic respiratory failure and several reintubations for encephalopathy, sepsis, aspiration pneumonia.    Per MICU team summary :    63 y/o M with a h/o COPD, a-fib (no a/c), CHF, alcoholic cirrhosis, EtOH abuse, EtOH withdrawal (intubated in past for airway protection), initially admitted on 10/28 for COPD exacerbation and alcohol intoxication, signed out AMA and was found later on in hospital disoriented. Readmitted and RRT called later on when patient became tremulous, agitated, delirious, and began hallucinating. Transferred to MICU for further management with benzos and precedex gtt. Patient became obtunded, lost ability to protect airway, and was subsequently intubated. As per report, patient is highly sensitive to benzodiazepines.  CT head neg for acute events. Course complicated by a-fib with RVR, sepsis, pseudomonas aspiration pneumonia. Self extubated on 11/2 without need for reintubation initially but on the morning of 11/4 patient obtunded  with difficulty managing secretions. Reintubated for airway protection. Pt has had recurrent ruptured  balloons and ETT breakage.  He had failed his weaning trials and had a tracheostomy placed on Thurs. 11/9.  He still has NGT and will eventually need PEG placement.          Acute Hypoxic Respiratory failure:  Unable to wean him off of vent  Currently Trached 11/9/17 and on vent support  Pulm consulted for vent management    A fib, persistent with RVR:  Continue Metoprolol  currently slightly hypotensive and so rate control is challenging  shall consider Cardiology eval in am, for Possible amiodarone, if Hypotension continues    Aspiration PNA :  completed Antibiotic course 	    Alcohol withdrawal related delirium  No further signs of acute withdrawal. Periods of agitation and delirium continue  Seroquel    Alcoholic cardiomyopathy :  continue Lasix metoprolol and Lisinopril  last ECHO: 06/05/2017 : LVEF : 40-45 %    Nutrition: continue NG Tube feeds  Need PEG eventually. will D/W surgeon    DVT Prophylaxis : continue SQ Lovenox    Transfer to Hospital medicine service.  Higher level of care with respiratory failure on full vent support

## 2017-11-11 NOTE — PROGRESS NOTE ADULT - SUBJECTIVE AND OBJECTIVE BOX
[facility]  ----------------------------------------  CC: EVALUATE FOR ICU TRANSFER: ADMITTED FOR RESPIRATORY FAILURE, ASP PNA    PT NON VERBAL  follows few commands  HR, A FIB with fluctuating HR's on tele  Slightly hypotensive    no family at bed side, when I saw him  low grade fevers over nite     The patient is in no acute distress.  Denied any chest pain, palpitations, dyspnea, or abdominal pain.    Vital Signs Last 24 Hrs  T(C): 38.3 (11 Nov 2017 12:07), Max: 38.3 (11 Nov 2017 12:07)  T(F): 100.9 (11 Nov 2017 12:07), Max: 100.9 (11 Nov 2017 12:07)  HR: 109 (11 Nov 2017 17:14) (84 - 127)  BP: 102/58 (11 Nov 2017 16:00) (91/54 - 146/96)  BP(mean): 76 (11 Nov 2017 16:00) (67 - 111)  RR: 25 (11 Nov 2017 16:08) (13 - 26)  SpO2: 97% (11 Nov 2017 17:14) (94% - 100%)    CAPILLARY BLOOD GLUCOSE      PHYSICAL EXAMINATION:  ----------------------------------------  General appearance: alert,  HEENT: + trach, connected to vent   + NG   Neck: Supple, No JVD  Lungs: decreased breath sounds bilaterally  Cardiovascular: S1S2, Regular rhythm  Abdomen: Soft, Nontender, Positive bowel sounds  Extremities: No clubbing, No cyanosis, No edema  CNS: alert      LABORATORY STUDIES:  ----------------------------------------                        11.4   10.8  )-----------( 239      ( 11 Nov 2017 06:44 )             35.8     11-11    144  |  105  |  22.0<H>  ----------------------------<  116<H>  4.2   |  29.0  |  0.57    Ca    8.8      11 Nov 2017 06:44  Phos  2.7     11-11  Mg     2.0     11-11        PT/INR - ( 10 Nov 2017 07:13 )   PT: 13.4 sec;   INR: 1.21 ratio         PTT - ( 10 Nov 2017 07:13 )  PTT:31.6 sec      Culture - Urine (collected 08 Nov 2017 23:23)  Source: .Urine Catheterized  Final Report (10 Nov 2017 11:13):    No growth        MEDICATIONS  (STANDING):    ALBUTerol/ipratropium for Nebulization 3 milliLiter(s) Nebulizer every 6 hours  aspirin  chewable 81 milliGRAM(s) Oral daily  chlorhexidine 0.12% Liquid 15 milliLiter(s) Swish and Spit two times a day  enoxaparin Injectable 40 milliGRAM(s) SubCutaneous daily  folic acid 1 milliGRAM(s) Oral daily  furosemide    Tablet 20 milliGRAM(s) Oral daily  influenza   Vaccine 0.5 milliLiter(s) IntraMuscular once  lactulose Syrup 10 Gram(s) Oral every 12 hours  lisinopril 5 milliGRAM(s) Oral daily  metoprolol     tartrate 50 milliGRAM(s) Oral every 8 hours  multivitamin 1 Tablet(s) Oral daily  nicotine - 21 mG/24Hr(s) Patch 1 patch Transdermal daily  pantoprazole  Injectable 40 milliGRAM(s) IV Push daily  tamsulosin 0.4 milliGRAM(s) Oral at bedtime    MEDICATIONS  (PRN):  acetaminophen    Suspension 650 milliGRAM(s) Oral every 6 hours PRN For Temp greater than 38 C (100.4 F)  ALBUTerol    0.083% 2.5 milliGRAM(s) Nebulizer every 4 hours PRN Shortness of Breath and/or Wheezing  bisacodyl Suppository 10 milliGRAM(s) Rectal daily PRN Constipation  ondansetron Injectable 8 milliGRAM(s) IV Push every 6 hours PRN Vomiting      ASSESSMENT / PLAN:  ----------------------------------------

## 2017-11-11 NOTE — PROGRESS NOTE ADULT - PROBLEM SELECTOR PLAN 1
tracheostomy care   wean vent as able to tolerate  [facility] called to evaluate now that pt will be going to 6T/chronic vent unit  will see tomorrow to help with long term management

## 2017-11-12 LAB
ALBUMIN SERPL ELPH-MCNC: 3.2 G/DL — LOW (ref 3.3–5.2)
ALP SERPL-CCNC: 140 U/L — HIGH (ref 40–120)
ALT FLD-CCNC: 10 U/L — SIGNIFICANT CHANGE UP
ANION GAP SERPL CALC-SCNC: 13 MMOL/L — SIGNIFICANT CHANGE UP (ref 5–17)
APTT BLD: 30.5 SEC — SIGNIFICANT CHANGE UP (ref 27.5–37.4)
AST SERPL-CCNC: 15 U/L — SIGNIFICANT CHANGE UP
BILIRUB SERPL-MCNC: 0.8 MG/DL — SIGNIFICANT CHANGE UP (ref 0.4–2)
BUN SERPL-MCNC: 22 MG/DL — HIGH (ref 8–20)
CALCIUM SERPL-MCNC: 9.3 MG/DL — SIGNIFICANT CHANGE UP (ref 8.6–10.2)
CHLORIDE SERPL-SCNC: 107 MMOL/L — SIGNIFICANT CHANGE UP (ref 98–107)
CO2 SERPL-SCNC: 29 MMOL/L — SIGNIFICANT CHANGE UP (ref 22–29)
CREAT SERPL-MCNC: 0.58 MG/DL — SIGNIFICANT CHANGE UP (ref 0.5–1.3)
GLUCOSE SERPL-MCNC: 124 MG/DL — HIGH (ref 70–115)
HCT VFR BLD CALC: 36.6 % — LOW (ref 42–52)
HGB BLD-MCNC: 11.5 G/DL — LOW (ref 14–18)
INR BLD: 1.22 RATIO — HIGH (ref 0.88–1.16)
MAGNESIUM SERPL-MCNC: 2 MG/DL — SIGNIFICANT CHANGE UP (ref 1.6–2.6)
MCHC RBC-ENTMCNC: 30.3 PG — SIGNIFICANT CHANGE UP (ref 27–31)
MCHC RBC-ENTMCNC: 31.4 G/DL — LOW (ref 32–36)
MCV RBC AUTO: 96.3 FL — HIGH (ref 80–94)
PHOSPHATE SERPL-MCNC: 3.5 MG/DL — SIGNIFICANT CHANGE UP (ref 2.4–4.7)
PLATELET # BLD AUTO: 285 K/UL — SIGNIFICANT CHANGE UP (ref 150–400)
POTASSIUM SERPL-MCNC: 4 MMOL/L — SIGNIFICANT CHANGE UP (ref 3.5–5.3)
POTASSIUM SERPL-SCNC: 4 MMOL/L — SIGNIFICANT CHANGE UP (ref 3.5–5.3)
PROT SERPL-MCNC: 6.9 G/DL — SIGNIFICANT CHANGE UP (ref 6.6–8.7)
PROTHROM AB SERPL-ACNC: 13.5 SEC — HIGH (ref 9.8–12.7)
RBC # BLD: 3.8 M/UL — LOW (ref 4.6–6.2)
RBC # FLD: 16.2 % — HIGH (ref 11–15.6)
SODIUM SERPL-SCNC: 149 MMOL/L — HIGH (ref 135–145)
WBC # BLD: 10.7 K/UL — SIGNIFICANT CHANGE UP (ref 4.8–10.8)
WBC # FLD AUTO: 10.7 K/UL — SIGNIFICANT CHANGE UP (ref 4.8–10.8)

## 2017-11-12 PROCEDURE — 71010: CPT | Mod: 26

## 2017-11-12 PROCEDURE — 99233 SBSQ HOSP IP/OBS HIGH 50: CPT

## 2017-11-12 PROCEDURE — 99223 1ST HOSP IP/OBS HIGH 75: CPT

## 2017-11-12 RX ORDER — METOPROLOL TARTRATE 50 MG
5 TABLET ORAL EVERY 6 HOURS
Qty: 0 | Refills: 0 | Status: DISCONTINUED | OUTPATIENT
Start: 2017-11-12 | End: 2017-11-16

## 2017-11-12 RX ORDER — ACETAMINOPHEN 500 MG
1000 TABLET ORAL ONCE
Qty: 0 | Refills: 0 | Status: COMPLETED | OUTPATIENT
Start: 2017-11-12 | End: 2017-11-12

## 2017-11-12 RX ORDER — VANCOMYCIN HCL 1 G
1000 VIAL (EA) INTRAVENOUS ONCE
Qty: 0 | Refills: 0 | Status: COMPLETED | OUTPATIENT
Start: 2017-11-12 | End: 2017-11-12

## 2017-11-12 RX ORDER — PIPERACILLIN AND TAZOBACTAM 4; .5 G/20ML; G/20ML
3.38 INJECTION, POWDER, LYOPHILIZED, FOR SOLUTION INTRAVENOUS EVERY 8 HOURS
Qty: 0 | Refills: 0 | Status: DISCONTINUED | OUTPATIENT
Start: 2017-11-12 | End: 2017-11-18

## 2017-11-12 RX ORDER — SODIUM CHLORIDE 9 MG/ML
500 INJECTION INTRAMUSCULAR; INTRAVENOUS; SUBCUTANEOUS ONCE
Qty: 0 | Refills: 0 | Status: COMPLETED | OUTPATIENT
Start: 2017-11-12 | End: 2017-11-12

## 2017-11-12 RX ADMIN — CHLORHEXIDINE GLUCONATE 15 MILLILITER(S): 213 SOLUTION TOPICAL at 05:20

## 2017-11-12 RX ADMIN — PIPERACILLIN AND TAZOBACTAM 25 GRAM(S): 4; .5 INJECTION, POWDER, LYOPHILIZED, FOR SOLUTION INTRAVENOUS at 22:27

## 2017-11-12 RX ADMIN — CHLORHEXIDINE GLUCONATE 15 MILLILITER(S): 213 SOLUTION TOPICAL at 12:57

## 2017-11-12 RX ADMIN — Medication 250 MILLIGRAM(S): at 18:24

## 2017-11-12 RX ADMIN — PANTOPRAZOLE SODIUM 40 MILLIGRAM(S): 20 TABLET, DELAYED RELEASE ORAL at 10:42

## 2017-11-12 RX ADMIN — Medication 400 MILLIGRAM(S): at 10:42

## 2017-11-12 RX ADMIN — SCOPALAMINE 1.5 MILLIGRAM(S): 1 PATCH, EXTENDED RELEASE TRANSDERMAL at 10:43

## 2017-11-12 RX ADMIN — Medication 3 MILLILITER(S): at 21:29

## 2017-11-12 RX ADMIN — Medication 20 MILLIGRAM(S): at 05:20

## 2017-11-12 RX ADMIN — ENOXAPARIN SODIUM 40 MILLIGRAM(S): 100 INJECTION SUBCUTANEOUS at 10:43

## 2017-11-12 RX ADMIN — Medication 3 MILLILITER(S): at 08:57

## 2017-11-12 RX ADMIN — Medication 1 PATCH: at 15:59

## 2017-11-12 RX ADMIN — LISINOPRIL 5 MILLIGRAM(S): 2.5 TABLET ORAL at 05:20

## 2017-11-12 RX ADMIN — Medication 5 MILLIGRAM(S): at 17:44

## 2017-11-12 RX ADMIN — LACTULOSE 10 GRAM(S): 10 SOLUTION ORAL at 05:20

## 2017-11-12 RX ADMIN — PIPERACILLIN AND TAZOBACTAM 25 GRAM(S): 4; .5 INJECTION, POWDER, LYOPHILIZED, FOR SOLUTION INTRAVENOUS at 11:36

## 2017-11-12 RX ADMIN — Medication 50 MILLIGRAM(S): at 05:20

## 2017-11-12 RX ADMIN — SODIUM CHLORIDE 500 MILLILITER(S): 9 INJECTION INTRAMUSCULAR; INTRAVENOUS; SUBCUTANEOUS at 17:03

## 2017-11-12 RX ADMIN — Medication 3 MILLILITER(S): at 14:14

## 2017-11-12 RX ADMIN — Medication 400 MILLIGRAM(S): at 16:23

## 2017-11-12 RX ADMIN — Medication 1 PATCH: at 10:43

## 2017-11-12 NOTE — PROGRESS NOTE ADULT - SUBJECTIVE AND OBJECTIVE BOX
XU JOE  ----------------------------------------      CC :   EVALUATE FOR ICU TRANSFER: ADMITTED FOR RESPIRATORY FAILURE, ASP PNA    PT NON VERBAL  HAD LOT OF SECRETIONS this am, removed DUB SHELLI, and currently holding tube feeds  Multiple temp spikes, sent Blood cx and started empirically zosyn for susected Aspiration PNA    Denied any chest pain, palpitations, dyspnea, or abdominal pain.    Vital Signs Last 24 Hrs  T(C): 37.9 (12 Nov 2017 12:52), Max: 38.3 (11 Nov 2017 20:00)  T(F): 100.2 (12 Nov 2017 12:52), Max: 100.9 (11 Nov 2017 20:00)  HR: 107 (12 Nov 2017 14:23) (96 - 135)  BP: 140/80 (12 Nov 2017 12:52) (102/58 - 152/86)  BP(mean): 80 (11 Nov 2017 23:00) (76 - 86)  RR: 18 (12 Nov 2017 12:52) (16 - 34)  SpO2: 93% (12 Nov 2017 14:23) (92% - 100%)    CAPILLARY BLOOD GLUCOSE        PHYSICAL EXAMINATION:  ----------------------------------------    General appearance: alert,  HEENT: + trach, connected to vent   + NG   Neck: Supple, No JVD  Lungs: decreased breath sounds bilaterally  Cardiovascular: S1S2, Regular rhythm  Abdomen: Soft, Nontender, Positive bowel sounds  Extremities: No clubbing, No cyanosis, No edema      LABORATORY STUDIES:  ----------------------------------------                        11.5   10.7  )-----------( 285      ( 12 Nov 2017 09:09 )             36.6     11-12    149<H>  |  107  |  22.0<H>  ----------------------------<  124<H>  4.0   |  29.0  |  0.58    Ca    9.3      12 Nov 2017 09:09  Phos  3.5     11-12  Mg     2.0     11-12    TPro  6.9  /  Alb  3.2<L>  /  TBili  0.8  /  DBili  x   /  AST  15  /  ALT  10  /  AlkPhos  140<H>  11-12    LIVER FUNCTIONS - ( 12 Nov 2017 09:09 )  Alb: 3.2 g/dL / Pro: 6.9 g/dL / ALK PHOS: 140 U/L / ALT: 10 U/L / AST: 15 U/L / GGT: x           PT/INR - ( 12 Nov 2017 12:51 )   PT: 13.5 sec;   INR: 1.22 ratio         PTT - ( 12 Nov 2017 12:51 )  PTT:30.5 sec      MEDICATIONS  (STANDING):  ALBUTerol/ipratropium for Nebulization 3 milliLiter(s) Nebulizer every 6 hours  aspirin  chewable 81 milliGRAM(s) Oral daily  chlorhexidine 0.12% Liquid 15 milliLiter(s) Swish and Spit two times a day  enoxaparin Injectable 40 milliGRAM(s) SubCutaneous daily  folic acid 1 milliGRAM(s) Oral daily  furosemide    Tablet 20 milliGRAM(s) Oral daily  influenza   Vaccine 0.5 milliLiter(s) IntraMuscular once  lactulose Syrup 10 Gram(s) Oral every 12 hours  lisinopril 5 milliGRAM(s) Oral daily  metoprolol     tartrate 50 milliGRAM(s) Oral every 8 hours  multivitamin 1 Tablet(s) Oral daily  nicotine - 21 mG/24Hr(s) Patch 1 patch Transdermal daily  pantoprazole  Injectable 40 milliGRAM(s) IV Push daily  piperacillin/tazobactam IVPB. 3.375 Gram(s) IV Intermittent every 8 hours  tamsulosin 0.4 milliGRAM(s) Oral at bedtime    MEDICATIONS  (PRN):  acetaminophen    Suspension 650 milliGRAM(s) Oral every 6 hours PRN For Temp greater than 38 C (100.4 F)  ALBUTerol    0.083% 2.5 milliGRAM(s) Nebulizer every 4 hours PRN Shortness of Breath and/or Wheezing  bisacodyl Suppository 10 milliGRAM(s) Rectal daily PRN Constipation  ondansetron Injectable 8 milliGRAM(s) IV Push every 6 hours PRN Vomiting      ASSESSMENT / PLAN:  ----------------------------------------

## 2017-11-12 NOTE — PROGRESS NOTE ADULT - SUBJECTIVE AND OBJECTIVE BOX
Patient is a 62y old  Male who presents with a chief complaint of Shortness of breath (30 Oct 2017 15:03)    PAST MEDICAL & SURGICAL HISTORY:  Falls  Meningitis  Collapsed lung  Alcohol withdrawal  Emphysema of lung  ETOH abuse  Cirrhosis  Afib  CHF (congestive heart failure)  Poor historian  Alcohol abuse  Chronic atrial fibrillation  S/P BKA (below knee amputation), left: secondary to worsening infection in lower leg. Had both ankle injuries from fall s/p repair - left had complication.  S/P BKA (below knee amputation) unilateral, left    CLATUS CARUANA   62y    Male    BRIEF HOSPITAL COURSE:    61 y/o M with a h/o COPD, a-fib (no a/c), CHF, alcoholic cirrhosis, EtOH abuse, EtOH withdrawal (intubated in past for airway protection), initially admitted on 10/28 for COPD exacerbation and alcohol intoxication, signed out AMA and was found later on in hospital disoriented. Readmitted and RRT called later on when patient became tremulous, agitated, delirious, and began hallucinating. Transferred to MICU for further management with benzos and precedex gtt. Patient became obtunded, lost ability to protect airway, and was subsequently intubated. As per report, patient is highly sensitive to benzodiazepines.  CT head neg for acute events. Course complicated by a-fib with RVR, sepsis, pseudomonas aspiration pneumonia. Self extubated on 11/2 without need for reintubation initially but on the morning of 11/4 patient obtunded  with difficulty managing secretions. Reintubated for airway protection. Pt has had recurrent ruptured  balloons and ETT breakage.  He had failed his weaning trials and had a tracheostomy placed on Thurs. 11/9.  He still has NGT and will eventually need PEG placement.      A fib with HR's to 130's      Review of Systems:     [facility]    vented    ICU Vital Signs Last 24 Hrs  T(C): 37.9 (11 Nov 2017 23:00), Max: 38.3 (11 Nov 2017 12:07)  T(F): 100.3 (11 Nov 2017 23:00), Max: 100.9 (11 Nov 2017 12:07)  HR: 100 (11 Nov 2017 23:00) (86 - 135)  BP: 109/59 (11 Nov 2017 23:00) (91/54 - 146/96)  BP(mean): 80 (11 Nov 2017 23:00) (67 - 111)  ABP: --  ABP(mean): --  RR: 16 (11 Nov 2017 23:00) (13 - 34)  SpO2: 97% (11 Nov 2017 23:00) (94% - 100%)      Physical Examination:    General:  NAD    HEENT:  Trach site clean    PULM: bilateral BS no wheeze or rhonchi     CVS:  s1 s2 irreg    ABD: soft nt    EXT:  LBKA  UE edema     SKIN:  warm    Neuro:  awake follows commands weak      Mode: standby    Mode: standby  LABS:                        11.4   10.8  )-----------( 239      ( 11 Nov 2017 06:44 )             35.8     11-11    144  |  105  |  22.0<H>  ----------------------------<  116<H>  4.2   |  29.0  |  0.57    Ca    8.8      11 Nov 2017 06:44  Phos  2.7     11-11  Mg     2.0     11-11      CAPILLARY BLOOD GLUCOSE    PT/INR - ( 10 Nov 2017 07:13 )   PT: 13.4 sec;   INR: 1.21 ratio         PTT - ( 10 Nov 2017 07:13 )  PTT:31.6 sec    CULTURES:  Culture Results:   No growth (11-08 @ 23:23)  Culture Results:   Rare Pseudomonas aeruginosa  Rare Routine respiratory raymundo present (11-08 @ 15:50)  Culture Results:   No growth at 48 hours (11-08 @ 15:48)  Culture Results:   No growth at 48 hours (11-08 @ 15:47)      Medications:    furosemide    Tablet 20 milliGRAM(s) Oral daily  lisinopril 5 milliGRAM(s) Oral daily  metoprolol     tartrate 50 milliGRAM(s) Oral every 8 hours  tamsulosin 0.4 milliGRAM(s) Oral at bedtime  ALBUTerol    0.083% 2.5 milliGRAM(s) Nebulizer every 4 hours PRN  ALBUTerol/ipratropium for Nebulization 3 milliLiter(s) Nebulizer every 6 hours  acetaminophen    Suspension 650 milliGRAM(s) Oral every 6 hours PRN  ondansetron Injectable 8 milliGRAM(s) IV Push every 6 hours PRN  aspirin  chewable 81 milliGRAM(s) Oral daily  enoxaparin Injectable 40 milliGRAM(s) SubCutaneous daily  bisacodyl Suppository 10 milliGRAM(s) Rectal daily PRN  lactulose Syrup 10 Gram(s) Oral every 12 hours  pantoprazole  Injectable 40 milliGRAM(s) IV Push daily  folic acid 1 milliGRAM(s) Oral daily  multivitamin 1 Tablet(s) Oral daily  influenza   Vaccine 0.5 milliLiter(s) IntraMuscular once  chlorhexidine 0.12% Liquid 15 milliLiter(s) Swish and Spit two times a day  nicotine - 21 mG/24Hr(s) Patch 1 patch Transdermal daily    11-10 @ 07:01 - 11-11 @ 07:00  --------------------------------------------------------  IN: 1292.8 mL / OUT: 1365 mL / NET: -72.2 mL    11-11 @ 07:01  - 11-12 @ 00:09  --------------------------------------------------------  IN: 1310 mL / OUT: 650 mL / NET: 660 mL      RADIOLOGY/IMAGING/ECHO    Critical care point of care ultrasound:    Assessment/Plan:    62 years old male with PMH of A. Fib (not on AC due to falls), CHF, COPD and Alcoholism.  In ICU for severe ETOH withdrawal.  Multiple episodes of recurrent respiratory failure after extubation due to AMS and secretion issues.  Aspiration pneumonitis  s/p RX  s/p Trach 11/10       VDRF  s/p Trach   transitioning to trach collar with nocturnal vent   A fib RVR  extra IVP lopressor given   can consider digoxin loading   No AC as outpatient due to falls and ETOH abuse.  ? if he can have while here     AMS improved.    ?? if needs peg.  To be determined needs swallow eval when off vent completely.

## 2017-11-12 NOTE — CONSULT NOTE ADULT - ASSESSMENT
Imp--Resp failure.  ETOH cardiomypathy,Pseudomonas in sputum, Etoh withdrawal.  Some bleeding around trach. ?high INR from cirrhosis  Plan--continue daytime TC, Noct CPAP.  Wean to TC further as darryl.  Check INR.

## 2017-11-12 NOTE — CONSULT NOTE ADULT - SUBJECTIVE AND OBJECTIVE BOX
PULMONARY CONSULT NOTE      STONE DIAZN-[id number]    Patient is a 62y old  Male who presents with a chief complaint of Shortness of breath (30 Oct 2017 15:03)      INTERVAL HPI/OVERNIGHT EVENTS:  63 y/o M with a h/o COPD, a-fib (no a/c), CHF, alcoholic cirrhosis, EtOH abuse, EtOH withdrawal (intubated in past for airway protection), initially admitted on 10/28 for COPD exacerbation and alcohol intoxication, signed out AMA and was found later on in hospital disoriented. Readmitted and RRT called later on when patient became tremulous, agitated, delirious, and began hallucinating. Transferred to MICU for further management with benzos and precedex gtt. Patient became obtunded, lost ability to protect airway, and was subsequently intubated. As per report, patient is highly sensitive to benzodiazepines.  CT head neg for acute events. Course complicated by a-fib with RVR, sepsis, pseudomonas aspiration pneumonia. Self extubated on 11/2 without need for reintubation initially but on the morning of 11/4 patient obtunded  with difficulty managing secretions. Reintubated for airway protection. Pt has had recurrent ruptured  balloons and ETT breakage.  He had failed his weaning trials and had a tracheostomy placed on Thurs. 11/9.  He still has NGT and will eventually need PEG placement.      Currently obn TC during day, CPAP at night.    MEDICATIONS  (STANDING):  ALBUTerol/ipratropium for Nebulization 3 milliLiter(s) Nebulizer every 6 hours  aspirin  chewable 81 milliGRAM(s) Oral daily  chlorhexidine 0.12% Liquid 15 milliLiter(s) Swish and Spit two times a day  enoxaparin Injectable 40 milliGRAM(s) SubCutaneous daily  folic acid 1 milliGRAM(s) Oral daily  furosemide    Tablet 20 milliGRAM(s) Oral daily  influenza   Vaccine 0.5 milliLiter(s) IntraMuscular once  lactulose Syrup 10 Gram(s) Oral every 12 hours  lisinopril 5 milliGRAM(s) Oral daily  metoprolol     tartrate 50 milliGRAM(s) Oral every 8 hours  multivitamin 1 Tablet(s) Oral daily  nicotine - 21 mG/24Hr(s) Patch 1 patch Transdermal daily  pantoprazole  Injectable 40 milliGRAM(s) IV Push daily  piperacillin/tazobactam IVPB. 3.375 Gram(s) IV Intermittent every 8 hours  tamsulosin 0.4 milliGRAM(s) Oral at bedtime      MEDICATIONS  (PRN):  acetaminophen    Suspension 650 milliGRAM(s) Oral every 6 hours PRN For Temp greater than 38 C (100.4 F)  ALBUTerol    0.083% 2.5 milliGRAM(s) Nebulizer every 4 hours PRN Shortness of Breath and/or Wheezing  bisacodyl Suppository 10 milliGRAM(s) Rectal daily PRN Constipation  ondansetron Injectable 8 milliGRAM(s) IV Push every 6 hours PRN Vomiting      Allergies    Ceclor (Rash)    Intolerances        PAST MEDICAL & SURGICAL HISTORY:  Falls  Meningitis  Collapsed lung  Alcohol withdrawal  Emphysema of lung  ETOH abuse  Cirrhosis  Afib  CHF (congestive heart failure)  Poor historian  Alcohol abuse  Chronic atrial fibrillation  S/P BKA (below knee amputation), left: secondary to worsening infection in lower leg. Had both ankle injuries from fall s/p repair - left had complication.  S/P BKA (below knee amputation) unilateral, left      FAMILY HISTORY:  Family history unknown: Adopted  No pertinent family history in first degree relatives      SOCIAL HISTORY  Smoking History: former    REVIEW OF SYSTEMS:    CONSTITUTIONAL:  As per HPI.    HEENT:  Eyes:  No diplopia or blurred vision. ENT:  No earache, sore throat or runny nose.    CARDIOVASCULAR:  No pressure, squeezing, tightness, heaviness or aching about the chest; no palpitations.    RESPIRATORY:  No cough, shortness of breath, PND or orthopnea. Mild SOBOE    GASTROINTESTINAL:  No nausea, vomiting or diarrhea.    GENITOURINARY:  No dysuria, frequency or urgency.    MUSCULOSKELETAL:  No joint pains    SKIN:  No new lesions.    NEUROLOGIC:  No paresthesias, fasciculations, seizures or weakness.    PSYCHIATRIC:  No disorder of thought or mood.    ENDOCRINE:  No heat or cold intolerance, polyuria or polydipsia.    HEMATOLOGICAL:  No easy bruising or bleeding.     Vital Signs Last 24 Hrs  T(C): 37.9 (12 Nov 2017 08:02), Max: 38.3 (11 Nov 2017 12:07)  T(F): 100.3 (12 Nov 2017 08:02), Max: 100.9 (11 Nov 2017 12:07)  HR: 96 (12 Nov 2017 08:02) (96 - 135)  BP: 148/85 (12 Nov 2017 08:02) (102/58 - 152/86)  BP(mean): 80 (11 Nov 2017 23:00) (72 - 89)  RR: 18 (12 Nov 2017 08:02) (16 - 34)  SpO2: 96% (12 Nov 2017 09:27) (94% - 100%)    PHYSICAL EXAMINATION:    GENERAL: The patient is a well-developed, well-nourished WM_in no apparent distress. On TC    HEENT: Head is normocephalic and atraumatic. Extraocular muscles are intact. Mucous membranes are moist.     NECK: Supple. Trach in place with bleeding around site    LUNGS: Clear to auscultation without wheezing, rales, or rhonchi. Respirations unlabored    HEART: Regular rate and rhythm without murmur.    ABDOMEN: Soft, nontender, and nondistended.  No hepatosplenomegaly is noted.    EXTREMITIES: Without any cyanosis, clubbing, rash, lesions or edema.    NEUROLOGIC: Grossly intact.    SKIN: No ulceration or induration present.      LABS:                        11.5   10.7  )-----------( 285      ( 12 Nov 2017 09:09 )             36.6     11-12    149<H>  |  107  |  22.0<H>  ----------------------------<  124<H>  4.0   |  29.0  |  0.58    Ca    9.3      12 Nov 2017 09:09  Phos  3.5     11-12  Mg     2.0     11-12    TPro  6.9  /  Alb  3.2<L>  /  TBili  0.8  /  DBili  x   /  AST  15  /  ALT  10  /  AlkPhos  140<H>  11-12                        MICROBIOLOGY:Culture - Sputum . (11.08.17 @ 15:50)    -  Piperacillin/Tazobactam: S <=16    -  Tobramycin: S <=4    -  Aztreonam: S 8    -  Cefepime: S 8    -  Ceftazidime: I 16    -  Ciprofloxacin: S <=1    -  Gentamicin: S <=4    -  Imipenem: S <=1    -  Levofloxacin: S <=2    -  Meropenem: S <=1    Gram Stain:   Few WBC's  No organisms seen    -  Amikacin: S <=16    Specimen Source: .Sputum Sputum    Culture Results:   Rare Pseudomonas aeruginosa  Rare Routine respiratory raymundo present    Organism Identification: Pseudomonas aeruginosa    Organism: Pseudomonas aeruginosa    Method Type: LADY    < from: TTE Echo Complete w/Doppler (11.06.17 @ 10:18) >     Summary:   1. Severely increased left ventricular internal cavity size.   2. Normal global left ventricular systolic function. Left ventricular   ejection fraction, by visual estimation, is 55 to 60%.   3. Severely dilated right atrium.   4. Severely enlarged left atrium.   5. There is anterior mitral leaflet prolapse. There is moderate to   severe posteriorly directed MR.   6. There is no evidence of pericardial effusion.   7. ** Compared to TTE dated 10/31/17, no significant changes.     Meera Meade DO Electronically signed on 11/6/2017 at 2:43:32 PM       < end of copied text >      RADIOLOGY & ADDITIONAL STUDIES:< from: Xray Chest 1 View AP/PA. (11.10.17 @ 16:39) >     EXAM:  CHEST SINGLE VIEW FRONTAL                          PROCEDURE DATE:  11/10/2017          INTERPRETATION:  Portable chest radiograph        CLINICAL INFORMATION: Status post tracheostomy placement.    TECHNIQUE:  Portable  AP view of the chest was obtained.    COMPARISON: 11/8/2017 available for review.    FINDINGS:  Tracheostomy tube in place.  NG tube tip beyond GE junction.  The lungs  are clear.  No pleural abnormality is seen.         The  heart is enlarged in transverse diameter. Nohilar mass. Trachea   midline.        .         Visualized osseous structures are intact.        IMPRESSION:   Catheters in place. Cardiomegaly. Lungs clear..                          MARK COATS M.D., ATTENDING RADIOLOGIST  This document has been electronically signed. Nov 11 2017  9:04AM        < end of copied text >  < from: CT Angio Chest w/ IV Cont (11.03.17 @ 17:10) >  IMPRESSION:    No evidence of central pulmonary emboli. Peripheral lower lobe branches   cannot be adequately evaluated due to poor opacification..  Gross cardiomegaly.  Mediastinal left hilar adenopathy.  Bilateral mild pleural effusions with left greater than right bibasilar   airspace consolidations.      < end of copied text >

## 2017-11-12 NOTE — PROGRESS NOTE ADULT - ASSESSMENT
63 y/o M with a h/o COPD, a-fib (no a/c), CHF, alcoholic cardiomyopathy, alcoholic cirrhosis, EtOH abuse, EtOH withdrawal, acute hypoxic respiratory failure and several reintubations for encephalopathy, sepsis, aspiration pneumonia.    Per MICU team summary :    63 y/o M with a h/o COPD, a-fib (no a/c), CHF, alcoholic cirrhosis, EtOH abuse, EtOH withdrawal (intubated in past for airway protection), initially admitted on 10/28 for COPD exacerbation and alcohol intoxication, signed out AMA and was found later on in hospital disoriented. Readmitted and RRT called later on when patient became tremulous, agitated, delirious, and began hallucinating. Transferred to MICU for further management with benzos and precedex gtt. Patient became obtunded, lost ability to protect airway, and was subsequently intubated. As per report, patient is highly sensitive to benzodiazepines.  CT head neg for acute events. Course complicated by a-fib with RVR, sepsis, pseudomonas aspiration pneumonia. Self extubated on 11/2 without need for reintubation initially but on the morning of 11/4 patient obtunded  with difficulty managing secretions. Reintubated for airway protection. Pt has had recurrent ruptured  balloons and ETT breakage.  He had failed his weaning trials and had a tracheostomy placed on Thurs. 11/9.  He still has NGT and will eventually need PEG placement.      Acute Hypoxic Respiratory failure:  Unable to wean him off of vent  Currently Trached 11/9/17 and on vent support  Pulm consulted for vent management    A fib, persistent with RVR:  Continue Metoprolol  shall consider Cardiology eval in am, for Possible amiodarone, if Hypotension continues    Suspected Aspiration PNA :  started zosyn 11/12 and sent blood cx  follow up	    Alcohol withdrawal related delirium  No further signs of acute withdrawal. Periods of agitation and delirium continue  Seroquel    Alcoholic cardiomyopathy :  continue Lasix metoprolol and Lisinopril  last ECHO: 06/05/2017 : LVEF : 40-45 %    Nutrition: Hold  NG Tube feeds for 24 hrs   Need PEG eventually. will D/W surgeon    DVT Prophylaxis : continue SQ Lovenox  .  Higher level of care with respiratory failure on full vent support

## 2017-11-13 LAB
-  COAGULASE NEGATIVE STAPHYLOCOCCUS: SIGNIFICANT CHANGE UP
ALBUMIN SERPL ELPH-MCNC: 2.9 G/DL — LOW (ref 3.3–5.2)
ALP SERPL-CCNC: 114 U/L — SIGNIFICANT CHANGE UP (ref 40–120)
ALT FLD-CCNC: 10 U/L — SIGNIFICANT CHANGE UP
ANION GAP SERPL CALC-SCNC: 12 MMOL/L — SIGNIFICANT CHANGE UP (ref 5–17)
ANION GAP SERPL CALC-SCNC: 14 MMOL/L — SIGNIFICANT CHANGE UP (ref 5–17)
APPEARANCE UR: CLEAR — SIGNIFICANT CHANGE UP
AST SERPL-CCNC: 17 U/L — SIGNIFICANT CHANGE UP
BACTERIA # UR AUTO: ABNORMAL
BASE EXCESS BLDA CALC-SCNC: 3.7 MMOL/L — HIGH (ref -3–3)
BASOPHILS # BLD AUTO: 0 K/UL — SIGNIFICANT CHANGE UP (ref 0–0.2)
BASOPHILS NFR BLD AUTO: 0.1 % — SIGNIFICANT CHANGE UP (ref 0–2)
BILIRUB SERPL-MCNC: 1.6 MG/DL — SIGNIFICANT CHANGE UP (ref 0.4–2)
BILIRUB UR-MCNC: NEGATIVE — SIGNIFICANT CHANGE UP
BLOOD GAS COMMENTS ARTERIAL: SIGNIFICANT CHANGE UP
BUN SERPL-MCNC: 29 MG/DL — HIGH (ref 8–20)
BUN SERPL-MCNC: 31 MG/DL — HIGH (ref 8–20)
CALCIUM SERPL-MCNC: 9.3 MG/DL — SIGNIFICANT CHANGE UP (ref 8.6–10.2)
CALCIUM SERPL-MCNC: 9.5 MG/DL — SIGNIFICANT CHANGE UP (ref 8.6–10.2)
CHLORIDE SERPL-SCNC: 108 MMOL/L — HIGH (ref 98–107)
CHLORIDE SERPL-SCNC: 110 MMOL/L — HIGH (ref 98–107)
CO2 SERPL-SCNC: 27 MMOL/L — SIGNIFICANT CHANGE UP (ref 22–29)
CO2 SERPL-SCNC: 28 MMOL/L — SIGNIFICANT CHANGE UP (ref 22–29)
COLOR SPEC: YELLOW — SIGNIFICANT CHANGE UP
CREAT SERPL-MCNC: 0.79 MG/DL — SIGNIFICANT CHANGE UP (ref 0.5–1.3)
CREAT SERPL-MCNC: 0.88 MG/DL — SIGNIFICANT CHANGE UP (ref 0.5–1.3)
CRP SERPL-MCNC: 17.1 MG/DL — HIGH (ref 0–0.4)
CULTURE RESULTS: SIGNIFICANT CHANGE UP
CULTURE RESULTS: SIGNIFICANT CHANGE UP
DIFF PNL FLD: NEGATIVE — SIGNIFICANT CHANGE UP
EOSINOPHIL # BLD AUTO: 0 K/UL — SIGNIFICANT CHANGE UP (ref 0–0.5)
EOSINOPHIL NFR BLD AUTO: 0 % — SIGNIFICANT CHANGE UP (ref 0–5)
GAS PNL BLDA: SIGNIFICANT CHANGE UP
GLUCOSE BLDC GLUCOMTR-MCNC: 105 MG/DL — HIGH (ref 70–99)
GLUCOSE BLDC GLUCOMTR-MCNC: 124 MG/DL — HIGH (ref 70–99)
GLUCOSE BLDC GLUCOMTR-MCNC: 125 MG/DL — HIGH (ref 70–99)
GLUCOSE SERPL-MCNC: 121 MG/DL — HIGH (ref 70–115)
GLUCOSE SERPL-MCNC: 124 MG/DL — HIGH (ref 70–115)
GLUCOSE UR QL: NEGATIVE MG/DL — SIGNIFICANT CHANGE UP
GRAM STN FLD: SIGNIFICANT CHANGE UP
HCO3 BLDA-SCNC: 28 MMOL/L — HIGH (ref 20–26)
HCT VFR BLD CALC: 33.6 % — LOW (ref 42–52)
HCT VFR BLD CALC: 36.5 % — LOW (ref 42–52)
HGB BLD-MCNC: 10.5 G/DL — LOW (ref 14–18)
HGB BLD-MCNC: 11.1 G/DL — LOW (ref 14–18)
HOROWITZ INDEX BLDA+IHG-RTO: 35 — SIGNIFICANT CHANGE UP
KETONES UR-MCNC: NEGATIVE — SIGNIFICANT CHANGE UP
LACTATE BLDV-MCNC: 1.1 MMOL/L — SIGNIFICANT CHANGE UP (ref 0.5–2)
LEUKOCYTE ESTERASE UR-ACNC: ABNORMAL
LYMPHOCYTES # BLD AUTO: 0.3 K/UL — LOW (ref 1–4.8)
LYMPHOCYTES # BLD AUTO: 2.6 % — LOW (ref 20–55)
MAGNESIUM SERPL-MCNC: 1.9 MG/DL — SIGNIFICANT CHANGE UP (ref 1.6–2.6)
MCHC RBC-ENTMCNC: 29.5 PG — SIGNIFICANT CHANGE UP (ref 27–31)
MCHC RBC-ENTMCNC: 29.9 PG — SIGNIFICANT CHANGE UP (ref 27–31)
MCHC RBC-ENTMCNC: 30.4 G/DL — LOW (ref 32–36)
MCHC RBC-ENTMCNC: 31.3 G/DL — LOW (ref 32–36)
MCV RBC AUTO: 95.7 FL — HIGH (ref 80–94)
MCV RBC AUTO: 97.1 FL — HIGH (ref 80–94)
METHOD TYPE: SIGNIFICANT CHANGE UP
MONOCYTES # BLD AUTO: 0.4 K/UL — SIGNIFICANT CHANGE UP (ref 0–0.8)
MONOCYTES NFR BLD AUTO: 3.1 % — SIGNIFICANT CHANGE UP (ref 3–10)
NEUTROPHILS # BLD AUTO: 11.8 K/UL — HIGH (ref 1.8–8)
NEUTROPHILS NFR BLD AUTO: 93.8 % — HIGH (ref 37–73)
NITRITE UR-MCNC: NEGATIVE — SIGNIFICANT CHANGE UP
PCO2 BLDA: 40 MMHG — SIGNIFICANT CHANGE UP (ref 35–45)
PH BLDA: 7.45 — SIGNIFICANT CHANGE UP (ref 7.35–7.45)
PH UR: 5 — SIGNIFICANT CHANGE UP (ref 5–8)
PHOSPHATE SERPL-MCNC: 3.5 MG/DL — SIGNIFICANT CHANGE UP (ref 2.4–4.7)
PLATELET # BLD AUTO: 266 K/UL — SIGNIFICANT CHANGE UP (ref 150–400)
PLATELET # BLD AUTO: 277 K/UL — SIGNIFICANT CHANGE UP (ref 150–400)
PO2 BLDA: 78 MMHG — LOW (ref 83–108)
POTASSIUM SERPL-MCNC: 3.7 MMOL/L — SIGNIFICANT CHANGE UP (ref 3.5–5.3)
POTASSIUM SERPL-MCNC: 3.8 MMOL/L — SIGNIFICANT CHANGE UP (ref 3.5–5.3)
POTASSIUM SERPL-SCNC: 3.7 MMOL/L — SIGNIFICANT CHANGE UP (ref 3.5–5.3)
POTASSIUM SERPL-SCNC: 3.8 MMOL/L — SIGNIFICANT CHANGE UP (ref 3.5–5.3)
PROCALCITONIN SERPL-MCNC: 0.05 NG/ML — SIGNIFICANT CHANGE UP (ref 0–0.04)
PROT SERPL-MCNC: 6.7 G/DL — SIGNIFICANT CHANGE UP (ref 6.6–8.7)
PROT UR-MCNC: 30 MG/DL
RBC # BLD: 3.51 M/UL — LOW (ref 4.6–6.2)
RBC # BLD: 3.76 M/UL — LOW (ref 4.6–6.2)
RBC # FLD: 16.4 % — HIGH (ref 11–15.6)
RBC # FLD: 16.5 % — HIGH (ref 11–15.6)
SAO2 % BLDA: 96 % — SIGNIFICANT CHANGE UP (ref 95–99)
SODIUM SERPL-SCNC: 149 MMOL/L — HIGH (ref 135–145)
SODIUM SERPL-SCNC: 150 MMOL/L — HIGH (ref 135–145)
SP GR SPEC: 1.02 — SIGNIFICANT CHANGE UP (ref 1.01–1.02)
SPECIMEN SOURCE: SIGNIFICANT CHANGE UP
UROBILINOGEN FLD QL: 4 MG/DL
WBC # BLD: 12.4 K/UL — HIGH (ref 4.8–10.8)
WBC # BLD: 12.6 K/UL — HIGH (ref 4.8–10.8)
WBC # FLD AUTO: 12.4 K/UL — HIGH (ref 4.8–10.8)
WBC # FLD AUTO: 12.6 K/UL — HIGH (ref 4.8–10.8)
WBC UR QL: SIGNIFICANT CHANGE UP

## 2017-11-13 PROCEDURE — 99233 SBSQ HOSP IP/OBS HIGH 50: CPT

## 2017-11-13 PROCEDURE — 93010 ELECTROCARDIOGRAM REPORT: CPT | Mod: 76

## 2017-11-13 PROCEDURE — 71010: CPT | Mod: 26

## 2017-11-13 PROCEDURE — 71010: CPT | Mod: 26,77

## 2017-11-13 PROCEDURE — 99291 CRITICAL CARE FIRST HOUR: CPT

## 2017-11-13 RX ORDER — VANCOMYCIN HCL 1 G
1000 VIAL (EA) INTRAVENOUS EVERY 12 HOURS
Qty: 0 | Refills: 0 | Status: DISCONTINUED | OUTPATIENT
Start: 2017-11-13 | End: 2017-11-13

## 2017-11-13 RX ORDER — SODIUM CHLORIDE 9 MG/ML
1000 INJECTION, SOLUTION INTRAVENOUS ONCE
Qty: 0 | Refills: 0 | Status: COMPLETED | OUTPATIENT
Start: 2017-11-13 | End: 2017-11-13

## 2017-11-13 RX ORDER — HALOPERIDOL DECANOATE 100 MG/ML
2 INJECTION INTRAMUSCULAR EVERY 6 HOURS
Qty: 0 | Refills: 0 | Status: DISCONTINUED | OUTPATIENT
Start: 2017-11-13 | End: 2017-11-16

## 2017-11-13 RX ORDER — VANCOMYCIN HCL 1 G
1000 VIAL (EA) INTRAVENOUS
Qty: 0 | Refills: 0 | Status: DISCONTINUED | OUTPATIENT
Start: 2017-11-13 | End: 2017-11-13

## 2017-11-13 RX ORDER — INSULIN HUMAN 100 [IU]/ML
INJECTION, SOLUTION SUBCUTANEOUS EVERY 6 HOURS
Qty: 0 | Refills: 0 | Status: DISCONTINUED | OUTPATIENT
Start: 2017-11-13 | End: 2017-11-17

## 2017-11-13 RX ORDER — SODIUM CHLORIDE 9 MG/ML
1000 INJECTION, SOLUTION INTRAVENOUS
Qty: 0 | Refills: 0 | Status: DISCONTINUED | OUTPATIENT
Start: 2017-11-13 | End: 2017-11-14

## 2017-11-13 RX ORDER — ACETAMINOPHEN 500 MG
1000 TABLET ORAL ONCE
Qty: 0 | Refills: 0 | Status: COMPLETED | OUTPATIENT
Start: 2017-11-13 | End: 2017-11-13

## 2017-11-13 RX ORDER — SODIUM CHLORIDE 9 MG/ML
1000 INJECTION, SOLUTION INTRAVENOUS ONCE
Qty: 0 | Refills: 0 | Status: DISCONTINUED | OUTPATIENT
Start: 2017-11-13 | End: 2017-11-13

## 2017-11-13 RX ADMIN — Medication 400 MILLIGRAM(S): at 15:00

## 2017-11-13 RX ADMIN — HALOPERIDOL DECANOATE 2 MILLIGRAM(S): 100 INJECTION INTRAMUSCULAR at 17:10

## 2017-11-13 RX ADMIN — Medication 5 MILLIGRAM(S): at 12:45

## 2017-11-13 RX ADMIN — Medication 3 MILLILITER(S): at 09:33

## 2017-11-13 RX ADMIN — PIPERACILLIN AND TAZOBACTAM 25 GRAM(S): 4; .5 INJECTION, POWDER, LYOPHILIZED, FOR SOLUTION INTRAVENOUS at 21:32

## 2017-11-13 RX ADMIN — TAMSULOSIN HYDROCHLORIDE 0.4 MILLIGRAM(S): 0.4 CAPSULE ORAL at 21:32

## 2017-11-13 RX ADMIN — SODIUM CHLORIDE 125 MILLILITER(S): 9 INJECTION, SOLUTION INTRAVENOUS at 21:33

## 2017-11-13 RX ADMIN — Medication 3 MILLILITER(S): at 20:22

## 2017-11-13 RX ADMIN — Medication 3 MILLILITER(S): at 04:18

## 2017-11-13 RX ADMIN — Medication 1 MILLIGRAM(S): at 17:53

## 2017-11-13 RX ADMIN — CHLORHEXIDINE GLUCONATE 15 MILLILITER(S): 213 SOLUTION TOPICAL at 06:34

## 2017-11-13 RX ADMIN — Medication 5 MILLIGRAM(S): at 00:41

## 2017-11-13 RX ADMIN — PIPERACILLIN AND TAZOBACTAM 25 GRAM(S): 4; .5 INJECTION, POWDER, LYOPHILIZED, FOR SOLUTION INTRAVENOUS at 16:00

## 2017-11-13 RX ADMIN — Medication 1000 MILLIGRAM(S): at 15:15

## 2017-11-13 RX ADMIN — Medication 1 PATCH: at 13:00

## 2017-11-13 RX ADMIN — LACTULOSE 10 GRAM(S): 10 SOLUTION ORAL at 21:32

## 2017-11-13 RX ADMIN — SODIUM CHLORIDE 2000 MILLILITER(S): 9 INJECTION, SOLUTION INTRAVENOUS at 11:15

## 2017-11-13 RX ADMIN — SODIUM CHLORIDE 125 MILLILITER(S): 9 INJECTION, SOLUTION INTRAVENOUS at 14:12

## 2017-11-13 RX ADMIN — SODIUM CHLORIDE 3000 MILLILITER(S): 9 INJECTION, SOLUTION INTRAVENOUS at 08:42

## 2017-11-13 RX ADMIN — CHLORHEXIDINE GLUCONATE 15 MILLILITER(S): 213 SOLUTION TOPICAL at 18:09

## 2017-11-13 RX ADMIN — PANTOPRAZOLE SODIUM 40 MILLIGRAM(S): 20 TABLET, DELAYED RELEASE ORAL at 18:09

## 2017-11-13 RX ADMIN — Medication 400 MILLIGRAM(S): at 09:17

## 2017-11-13 RX ADMIN — PIPERACILLIN AND TAZOBACTAM 25 GRAM(S): 4; .5 INJECTION, POWDER, LYOPHILIZED, FOR SOLUTION INTRAVENOUS at 06:33

## 2017-11-13 RX ADMIN — Medication 5 MILLIGRAM(S): at 06:34

## 2017-11-13 RX ADMIN — ENOXAPARIN SODIUM 40 MILLIGRAM(S): 100 INJECTION SUBCUTANEOUS at 14:14

## 2017-11-13 RX ADMIN — Medication 5 MILLIGRAM(S): at 18:10

## 2017-11-13 RX ADMIN — Medication 1 TABLET(S): at 17:52

## 2017-11-13 RX ADMIN — Medication 81 MILLIGRAM(S): at 17:53

## 2017-11-13 NOTE — CONSULT NOTE ADULT - SUBJECTIVE AND OBJECTIVE BOX
Patient is a 62y old  Male who presents with a chief complaint of Shortness of breath (30 Oct 2017 15:03)      BRIEF HOSPITAL COURSE: 63 y/o M with a h/o COPD, a-fib (no a/c), CHF, alcoholic cirrhosis, EtOH abuse, EtOH withdrawal (intubated in past for airway protection), initially admitted on 10/28 for COPD exacerbation and alcohol intoxication, signed out AMA and was found later on in hospital disoriented. Readmitted and RRT called later on when patient became tremulous, agitated, delirious, and began hallucinating. Transferred to MICU for further management with benzos and precedex gtt. Patient became obtunded, lost ability to protect airway, and was subsequently intubated. As per report, patient is highly sensitive to benzodiazepines.  CT head neg for acute events. Course complicated by a-fib with RVR, sepsis, pseudomonas aspiration pneumonia. Self extubated on 11/2 without need for reintubation initially but on the morning of 11/4 patient obtunded  with difficulty managing secretions. Reintubated for airway protection. Pt has had recurrent ruptured  balloons and ETT breakage.  He had failed his weaning trials and had a tracheostomy placed on Thurs. 11/9.  He still has NGT and will eventually need PEG placement.        Events last 24 hours: the patient has been having low grade fevers and this morning spiked a fever to 103 and had increased SVTs in the  140s. There was alos concern for potential aspiration. He continues to require full vent support on but his O2 sats are good and 35% FIO2     PAST MEDICAL & SURGICAL HISTORY:  Falls  Meningitis  Collapsed lung  Alcohol withdrawal  Emphysema of lung  ETOH abuse  Cirrhosis  Afib  CHF (congestive heart failure)  Poor historian  Alcohol abuse  Chronic atrial fibrillation  S/P BKA (below knee amputation), left: secondary to worsening infection in lower leg. Had both ankle injuries from fall s/p repair - left had complication.  S/P BKA (below knee amputation) unilateral, left    Allergies    Ceclor (Rash)    Intolerances      FAMILY HISTORY:  Family history unknown: Adopted  No pertinent family history in first degree relatives      Review of Systems:  CONSTITUTIONAL: No fever, chills, or fatigue  EYES: No eye pain, visual disturbances, or discharge  ENMT:  No difficulty hearing, tinnitus, vertigo; No sinus or throat pain  NECK: No pain or stiffness  RESPIRATORY: No cough, wheezing, chills or hemoptysis; No shortness of breath  CARDIOVASCULAR: No chest pain, palpitations, dizziness, or leg swelling  GASTROINTESTINAL: No abdominal or epigastric pain. No nausea, vomiting, or hematemesis; No diarrhea or constipation. No melena or hematochezia.  GENITOURINARY: No dysuria, frequency, hematuria, or incontinence  NEUROLOGICAL: No headaches, memory loss, loss of strength, numbness, or tremors  SKIN: No itching, burning, rashes, or lesions   MUSCULOSKELETAL: No joint pain or swelling; No muscle, back, or extremity pain  PSYCHIATRIC: No depression, anxiety, mood swings, or difficulty sleeping      Medications:  piperacillin/tazobactam IVPB. 3.375 Gram(s) IV Intermittent every 8 hours    furosemide    Tablet 20 milliGRAM(s) Oral daily  lisinopril 5 milliGRAM(s) Oral daily  metoprolol    tartrate Injectable 5 milliGRAM(s) IV Push every 6 hours  tamsulosin 0.4 milliGRAM(s) Oral at bedtime    ALBUTerol    0.083% 2.5 milliGRAM(s) Nebulizer every 4 hours PRN  ALBUTerol/ipratropium for Nebulization 3 milliLiter(s) Nebulizer every 6 hours    acetaminophen    Suspension 650 milliGRAM(s) Oral every 6 hours PRN  ondansetron Injectable 8 milliGRAM(s) IV Push every 6 hours PRN      aspirin  chewable 81 milliGRAM(s) Oral daily  enoxaparin Injectable 40 milliGRAM(s) SubCutaneous daily    bisacodyl Suppository 10 milliGRAM(s) Rectal daily PRN  lactulose Syrup 10 Gram(s) Oral every 12 hours  pantoprazole  Injectable 40 milliGRAM(s) IV Push daily      insulin regular  human corrective regimen sliding scale   SubCutaneous every 6 hours    folic acid 1 milliGRAM(s) Oral daily  multiple electrolytes Injection Type 1 1000 milliLiter(s) IV Continuous <Continuous>  multiple electrolytes Injection Type 1 Bolus 1000 milliLiter(s) IV Bolus once  multivitamin 1 Tablet(s) Oral daily    influenza   Vaccine 0.5 milliLiter(s) IntraMuscular once    chlorhexidine 0.12% Liquid 15 milliLiter(s) Swish and Spit two times a day    nicotine - 21 mG/24Hr(s) Patch 1 patch Transdermal daily      Mode: AC/ CMV (Assist Control/ Continuous Mandatory Ventilation)  RR (machine): 12  TV (machine): 480  FiO2: 35  PEEP: 5  MAP: 9  PIP: 25      ICU Vital Signs Last 24 Hrs  T(C): 39.7 (13 Nov 2017 08:40), Max: 39.7 (13 Nov 2017 08:40)  T(F): 103.4 (13 Nov 2017 08:40), Max: 103.4 (13 Nov 2017 08:40)  HR: 134 (13 Nov 2017 11:45) (83 - 146)  BP: 110/57 (13 Nov 2017 11:45) (95/51 - 140/80)  BP(mean): 78 (13 Nov 2017 11:45) (67 - 78)  ABP: --  ABP(mean): --  RR: 30 (13 Nov 2017 10:46) (18 - 31)  SpO2: 97% (13 Nov 2017 11:45) (92% - 98%)    Vital Signs Last 24 Hrs  T(C): 39.7 (13 Nov 2017 08:40), Max: 39.7 (13 Nov 2017 08:40)  T(F): 103.4 (13 Nov 2017 08:40), Max: 103.4 (13 Nov 2017 08:40)  HR: 134 (13 Nov 2017 11:45) (83 - 146)  BP: 110/57 (13 Nov 2017 11:45) (95/51 - 140/80)  BP(mean): 78 (13 Nov 2017 11:45) (67 - 78)  RR: 30 (13 Nov 2017 10:46) (18 - 31)  SpO2: 97% (13 Nov 2017 11:45) (92% - 98%)        I&O's Detail    12 Nov 2017 07:01  -  13 Nov 2017 07:00  --------------------------------------------------------  IN:    Sodium Chloride 0.9% IV Bolus: 500 mL    Solution: 100 mL    Solution: 250 mL  Total IN: 850 mL    OUT:  Total OUT: 0 mL    Total NET: 850 mL            LABS:                        10.5   12.6  )-----------( 266      ( 13 Nov 2017 09:17 )             33.6     11-13    150<H>  |  108<H>  |  31.0<H>  ----------------------------<  124<H>  3.8   |  28.0  |  0.88    Ca    9.5      13 Nov 2017 09:33  Phos  3.5     11-13  Mg     1.9     11-13    TPro  6.7  /  Alb  2.9<L>  /  TBili  1.6  /  DBili  x   /  AST  17  /  ALT  10  /  AlkPhos  114  11-13          CAPILLARY BLOOD GLUCOSE      POCT Blood Glucose.: 124 mg/dL (13 Nov 2017 08:45)    PT/INR - ( 12 Nov 2017 12:51 )   PT: 13.5 sec;   INR: 1.22 ratio         PTT - ( 12 Nov 2017 12:51 )  PTT:30.5 sec    CULTURES:  Culture Results:   No growth (11-08 @ 23:23)  Culture Results:   Rare Pseudomonas aeruginosa  Rare Routine respiratory raymundo present (11-08 @ 15:50)  Culture Results:   No growth at 48 hours (11-08 @ 15:48)  Culture Results:   No growth at 48 hours (11-08 @ 15:47)      Physical Examination:    General: No acute distress on full vent support..  Lethargic but responsive, ,agitated at times pulling at lines and tubes.     HEENT: Pupils equal, reactive to light.  Symmetric.    PULM: Scattered Rhonchi , no significant sputum production    CVS: Irregular rate and rhythm, no murmurs, rubs, or gallops    ABD: Soft, nondistended, nontender, normoactive bowel sounds, no masses    EXT: No edema, nontender    SKIN: Warm and well perfused, no rashes noted.    RADIOLOGY: CXR: No focal consolidations noted     CRITICAL CARE TIME SPENT: 40 MIns

## 2017-11-13 NOTE — CONSULT NOTE ADULT - ATTENDING COMMENTS
I have seen and evaluated the patietn and agree with the assessment and plan with the following additions:    fever concern for microaspiration sputum cx pending 1 bcx + after 27 hour awaiting pcr likely contaminate hold vancomycin  haldol for agitation if qtc <500  continue vent weaning as tolerated  replace NG tube for feeding I have seen and evaluated the patietn and agree with the assessment and plan with the following additions:    fever concern for microaspiration sputum cx pending 1 bcx + after 27 hour awaiting pcr likely contaminate hold vancomycin  haldol for agitation if qtc <500  continue vent weaning as tolerated  replace NG tube for feeding  hypernatremia free H20 and IVF  afib monitor fever control continue metoprolol

## 2017-11-13 NOTE — CONSULT NOTE ADULT - ASSESSMENT
Impression/Plan:    1) Ventilator dependant respiratory failure: Related to persistent aspiration from Alcohol Withdrawal/Deleruim Tremens, with multiple failed extubation attempts. Now Trached. Will Continue VAP bundle, Long term vent weaning, Will Ultimately need PEG. Continue vent weans as tolerated. COntinue IV Zosyn and check sputum culture.    2) Severe Alcoholism with Alcohol Withdrawal and Delirum Tremens: Acute withdrawal is no subsided. However the patient continues to have ICU delirium which is multifactorial in nature, related to sleep deprivation, vent dependancy and persistent  aspiration and fevers. This course is vaery similar to his previous hospitalizations from withdrawal Will ultimately require PEG.    3) Dehydration/Hypernatremia: Start IV Hydration and Free water replacement. F/U Labs     4) COPD: COntinue Nebs     5) SVT/Chronic Afib: Naun rehydrate, Control fever and then rate control with Beta and CA+ blockade. Not an anticoagulation due to high risks associated with severe alcoholism.    I spoke with his daughter Sarita and updated her regarding his care.

## 2017-11-13 NOTE — PROGRESS NOTE ADULT - ASSESSMENT
63 y/o M with a h/o COPD, a-fib (no a/c), CHF, alcoholic cardiomyopathy, alcoholic cirrhosis, EtOH abuse, EtOH withdrawal, acute hypoxic respiratory failure and several reintubations for encephalopathy, sepsis, aspiration pneumonia.    Per MICU team summary :    63 y/o M with a h/o COPD, a-fib (no a/c), CHF, alcoholic cirrhosis, EtOH abuse, EtOH withdrawal (intubated in past for airway protection), initially admitted on 10/28 for COPD exacerbation and alcohol intoxication, signed out AMA and was found later on in hospital disoriented. Readmitted and RRT called later on when patient became tremulous, agitated, delirious, and began hallucinating. Transferred to MICU for further management with benzos and precedex gtt. Patient became obtunded, lost ability to protect airway, and was subsequently intubated. As per report, patient is highly sensitive to benzodiazepines.  CT head neg for acute events. Course complicated by a-fib with RVR, sepsis, pseudomonas aspiration pneumonia. Self extubated on 11/2 without need for reintubation initially but on the morning of 11/4 patient obtunded  with difficulty managing secretions. Reintubated for airway protection. Pt has had recurrent ruptured  balloons and ETT breakage.  He had failed his weaning trials and had a tracheostomy placed on Thurs. 11/9.  He still has NGT and will eventually need PEG placement.      Acute Hypoxic Respiratory failure:  Unable to wean him off of vent  Currently Trache 11/9/17 and on vent support  Pulm consulted for vent management  Because of worsening pt's condition and laboured breathing, ICU team re consulted    Severe SEPSIS: Likely from Aspiration Pneumonia  Temp spike 103, elevated wbc count tachycardic  Blood cx Pending  UA and Cx sent today  serum lactate pending  repeat X ray chest pending  continue Vancomycin and zosyn ( 11/12 )    A fib, persistent with RVR:  Continue IV Lopressor  cardiology team re consulted for Assistance with HR control, as Pt with Hypotension  Might need amiodarone drip  Gave one time dose of IV Cardizem 10 mg    Alcohol withdrawal related delirium  No further signs of acute withdrawal. Periods of agitation and delirium continue    Alcoholic cardiomyopathy :  Lasix metoprolol and Lisinopril will be hold for 24 hrs because of Hypotension  last ECHO: 06/05/2017 : LVEF : 40-45 %    Nutrition: NPO    NG Tube d/c ed 11/12, for suspected aspiration and Improper Position  Need PEG eventually. will D/W surgeon    DVT Prophylaxis : continue SQ Lovenox  .  Total critical time spent 40 Minutes in direct patient care  tried calling daughter Adela to provide updates: she didn't lift the phone. will re attempt to call her again to provide updates

## 2017-11-13 NOTE — CHART NOTE - NSCHARTNOTEFT_GEN_A_CORE
Code Sepsis called for Temp, HR, RR, BP    HPI:  62 years old male with PMH of A. Fib (not on AC due to falls), CHF, COPD and Alcoholism brought by EMS with shortness of breath. As per patient, he was drinking alcohol earlier today when he started having difficulty in breathing - so he called EMS. Denies chest pain, palpitations, cough, nausea, vomiting or dizziness. He has orthopnea and paroxysmal nocturnal dyspnea which are chronic. He also has chronic leg edema on right side - denies any change in it. He smokes 2 packs of cigarette per day for a long time. He is complaining of left shoulder pain - aggravates with shoulder movements. Does not remember if he got shoulder injury.   He was admitted at [facility] in August when he was treated for CHF and COPD. He was told that he also has cirrhosis likely secondary to alcoholism.   He has been in alcohol rehab in past and is willing to discuss that option again. (28 Oct 2017 01:28)    PAST MEDICAL & SURGICAL HISTORY:  Falls  Meningitis  Collapsed lung  Alcohol withdrawal  Emphysema of lung  ETOH abuse  Cirrhosis  Afib  CHF (congestive heart failure)  Poor historian  Alcohol abuse  Chronic atrial fibrillation  S/P BKA (below knee amputation), left: secondary to worsening infection in lower leg. Had both ankle injuries from fall s/p repair - left had complication.  Poor historian  S/P BKA (below knee amputation) unilateral, left      Allergies    Ceclor (Rash)    Intolerances      MEDICATIONS  (STANDING):  ALBUTerol/ipratropium for Nebulization 3 milliLiter(s) Nebulizer every 6 hours  aspirin  chewable 81 milliGRAM(s) Oral daily  chlorhexidine 0.12% Liquid 15 milliLiter(s) Swish and Spit two times a day  diltiazem Injectable 10 milliGRAM(s) IV Push once  enoxaparin Injectable 40 milliGRAM(s) SubCutaneous daily  folic acid 1 milliGRAM(s) Oral daily  furosemide    Tablet 20 milliGRAM(s) Oral daily  influenza   Vaccine 0.5 milliLiter(s) IntraMuscular once  lactulose Syrup 10 Gram(s) Oral every 12 hours  lisinopril 5 milliGRAM(s) Oral daily  metoprolol    tartrate Injectable 5 milliGRAM(s) IV Push every 6 hours  multiple electrolytes Injection Type 1 Bolus 1000 milliLiter(s) IV Bolus once  multiple electrolytes Injection Type 1 Bolus 1000 milliLiter(s) IV Bolus once  multivitamin 1 Tablet(s) Oral daily  nicotine - 21 mG/24Hr(s) Patch 1 patch Transdermal daily  pantoprazole  Injectable 40 milliGRAM(s) IV Push daily  piperacillin/tazobactam IVPB. 3.375 Gram(s) IV Intermittent every 8 hours  tamsulosin 0.4 milliGRAM(s) Oral at bedtime    MEDICATIONS  (PRN):  acetaminophen    Suspension 650 milliGRAM(s) Oral every 6 hours PRN For Temp greater than 38 C (100.4 F)  ALBUTerol    0.083% 2.5 milliGRAM(s) Nebulizer every 4 hours PRN Shortness of Breath and/or Wheezing  bisacodyl Suppository 10 milliGRAM(s) Rectal daily PRN Constipation  ondansetron Injectable 8 milliGRAM(s) IV Push every 6 hours PRN Vomiting      Vital Signs Last 24 Hrs  T(F): 103.4F rectally  HR: 111  BP: 114/55  RR: 22   SpO2: 97% on Vent    Daily   I&O's Summary    12 Nov 2017 07:01  -  13 Nov 2017 07:00  --------------------------------------------------------  IN: 850 mL / OUT: 0 mL / NET: 850 mL        PHYSICAL EXAM:    GENERAL: patient is non-verbal w trach in place, warm to the touch, agitated w wrist restraints.     NECK: Supple, No JVD  NERVOUS SYSTEM:  Alert and awake,  Grossly moving all extremities   CHEST/LUNG: right lower rales, moderate rhonchi throughout b/l lung fields, no wheezing, or rubs  HEART: Iregular rate and rhythm; No murmurs, rubs, or gallops  ABDOMEN: difficult to assess as patient is non-verbal w Trach, obese abdomen, Soft, Nondistended; Bowel sounds present  EXTREMITIES:  left BKA, 2+ Peripheral Pulses, No clubbing, cyanosis, +2 right LE edema  SKIN: warm, turgor good,  capillary refill    <2 sec      Assessment-  1. Sepsis- a new suspected infection + 2 of ( Temp >101/ HR 90 or more/ RR >20/ WBC >12 K or < 4K/ Bands > 5% )  2. Severe sepsis/ Septic shock- Sepsis + (AMS/ SBP< 90/ MAP <65/ SBP reduced > 40 mmHg from baseline/ Plts <100K/ Cr > 2/ Cr > 50% from baseline/>50% FiO2 required for SaO2 >90%/ U Output < 30 ml/hr or < 240 ml in 8 hrs/ Bilirubin >2/ Lactate >2/ INR > 1.5/   PTT> 60 secs )    -----------------------------------------------------------------------------------------------------------------------------------------------------------------------------------  Assessment-  1. Sepsis                                                                                                                                             Time                      Intervention-  STAT Labs- CBC, CMP, S Lactate, Blood C/S x 2, PT/ PTT/ INR, UA, Urine C/S, CXR                          Time drawn  Antibiotic -                                                                                                                                           Time started  (Monotherapy- Aminoglycosides/ Cipro/ Aztreonam/ Cephalosporin/ Clinda/ Dapto/ Vanco/ Linezolid/ Macrolide/ PCN)    OTHER antibiotic due to-     Intervention - Fluids                                                                                                                          Time started  Crystalloid fluid( NS; Ringer's; Plasmalyte)  Rate (30ml/Kg in 500ml boluses Q 15 mins until goal)  Total Volume-    NOT given fluids due to-     If Lactate >2 repeat after fluid bolus    Repeat Lactate                                                                                                                                   Time drawn  ----------------------------------------------------------------------------------------------------------------------------------------------------------------------------------- Code Sepsis called for Temp, HR, RR, BP    HPI:  62 years old male with PMH of A. Fib (not on AC due to falls), CHF, COPD and Alcoholism brought by EMS with shortness of breath. As per patient, he was drinking alcohol earlier today when he started having difficulty in breathing - so he called EMS. Denies chest pain, palpitations, cough, nausea, vomiting or dizziness. He has orthopnea and paroxysmal nocturnal dyspnea which are chronic. He also has chronic leg edema on right side - denies any change in it. He smokes 2 packs of cigarette per day for a long time. He is complaining of left shoulder pain - aggravates with shoulder movements. Does not remember if he got shoulder injury.   He was admitted at [facility] in August when he was treated for CHF and COPD. He was told that he also has cirrhosis likely secondary to alcoholism.   He has been in alcohol rehab in past and is willing to discuss that option again. (28 Oct 2017 01:28)    PAST MEDICAL & SURGICAL HISTORY:  Falls  Meningitis  Collapsed lung  Alcohol withdrawal  Emphysema of lung  ETOH abuse  Cirrhosis  Afib  CHF (congestive heart failure)  Poor historian  Alcohol abuse  Chronic atrial fibrillation  S/P BKA (below knee amputation), left: secondary to worsening infection in lower leg. Had both ankle injuries from fall s/p repair - left had complication.  Poor historian  S/P BKA (below knee amputation) unilateral, left      Allergies    Ceclor (Rash)    Intolerances      MEDICATIONS  (STANDING):  ALBUTerol/ipratropium for Nebulization 3 milliLiter(s) Nebulizer every 6 hours  aspirin  chewable 81 milliGRAM(s) Oral daily  chlorhexidine 0.12% Liquid 15 milliLiter(s) Swish and Spit two times a day  diltiazem Injectable 10 milliGRAM(s) IV Push once  enoxaparin Injectable 40 milliGRAM(s) SubCutaneous daily  folic acid 1 milliGRAM(s) Oral daily  furosemide    Tablet 20 milliGRAM(s) Oral daily  influenza   Vaccine 0.5 milliLiter(s) IntraMuscular once  lactulose Syrup 10 Gram(s) Oral every 12 hours  lisinopril 5 milliGRAM(s) Oral daily  metoprolol    tartrate Injectable 5 milliGRAM(s) IV Push every 6 hours  multiple electrolytes Injection Type 1 Bolus 1000 milliLiter(s) IV Bolus once  multiple electrolytes Injection Type 1 Bolus 1000 milliLiter(s) IV Bolus once  multivitamin 1 Tablet(s) Oral daily  nicotine - 21 mG/24Hr(s) Patch 1 patch Transdermal daily  pantoprazole  Injectable 40 milliGRAM(s) IV Push daily  piperacillin/tazobactam IVPB. 3.375 Gram(s) IV Intermittent every 8 hours  tamsulosin 0.4 milliGRAM(s) Oral at bedtime    MEDICATIONS  (PRN):  acetaminophen    Suspension 650 milliGRAM(s) Oral every 6 hours PRN For Temp greater than 38 C (100.4 F)  ALBUTerol    0.083% 2.5 milliGRAM(s) Nebulizer every 4 hours PRN Shortness of Breath and/or Wheezing  bisacodyl Suppository 10 milliGRAM(s) Rectal daily PRN Constipation  ondansetron Injectable 8 milliGRAM(s) IV Push every 6 hours PRN Vomiting      Vital Signs Last 24 Hrs  T(F): 103.4F rectally  HR: 111  BP: 114/55  RR: 22   SpO2: 97% on Vent    Daily   I&O's Summary    12 Nov 2017 07:01  -  13 Nov 2017 07:00  --------------------------------------------------------  IN: 850 mL / OUT: 0 mL / NET: 850 mL        PHYSICAL EXAM:    GENERAL: patient is non-verbal w trach in place, warm to the touch, agitated w wrist restraints.     NECK: Supple, No JVD  NERVOUS SYSTEM:  Alert and awake,  Grossly moving all extremities   CHEST/LUNG: right lower rales, moderate rhonchi throughout b/l lung fields, no wheezing, or rubs  HEART: Iregular rate and rhythm; No murmurs, rubs, or gallops  ABDOMEN: difficult to assess as patient is non-verbal w Trach, obese abdomen, Soft, Nondistended; Bowel sounds present  EXTREMITIES:  left BKA, 2+ Peripheral Pulses, No clubbing, cyanosis, +2 right LE edema  SKIN: warm, turgor good,  capillary refill  <2 sec      Assessment-  1. Sepsis- a new suspected infection + 2 of ( Temp >101/ HR 90 or more/ RR >20/ WBC >12 K or < 4K/ Bands > 5% )      -----------------------------------------------------------------------------------------------------------------------------------------------------------------------------------  Assessment-  1. Sepsis                                                                                                                                             Time    08:42                  Intervention-  STAT Labs- CBC, CMP, S Lactate, Blood C/S x 2, PT/ PTT/ INR, UA, Urine C/S, CXR                          Time drawn  08:51  Antibiotic -  Vanco/Zosyn was started 24hrs. earlier  Will keep same Abx for now.                         Time started  (Monotherapy- Aminoglycosides/ Cipro/ Aztreonam/ Cephalosporin/ Clinda/ Dapto/ Vanco/ Linezolid/ Macrolide/ PCN)    Intervention - Fluids                                                                                                                          Time started  Crystalloid fluid( Plasmalyte)  Rate (30ml/Kg in 500ml boluses Q 15 mins until goal)  Total Volume- 3L      If Lactate >2 repeat after fluid bolus    1. Sepsis likely 2/2 tracheal aspiration  Plan:  -CBC, BMP, procalcitonin, CRP, lactate, blood cultures, urine culture,  repeat CXR  EKG: afib  3L bolus of plasmalyte  Attending:  Code Sepsis called for Temp 103F rectal, , RR 20, /55    HPI:  62 years old male with PMH of A. Fib (not on AC due to falls), CHF, COPD and Alcoholism brought by EMS with shortness of breath. As per patient, he was drinking alcohol earlier today when he started having difficulty in breathing - so he called EMS. Denies chest pain, palpitations, cough, nausea, vomiting or dizziness. He has orthopnea and paroxysmal nocturnal dyspnea which are chronic. He also has chronic leg edema on right side - denies any change in it. He smokes 2 packs of cigarette per day for a long time. He is complaining of left shoulder pain - aggravates with shoulder movements. Does not remember if he got shoulder injury.   He was admitted at [facility] in August when he was treated for CHF and COPD. He was told that he also has cirrhosis likely secondary to alcoholism.   He has been in alcohol rehab in past and is willing to discuss that option again. (28 Oct 2017 01:28)    PAST MEDICAL & SURGICAL HISTORY:  Falls  Meningitis  Collapsed lung  Alcohol withdrawal  Emphysema of lung  ETOH abuse  Cirrhosis  Afib  CHF (congestive heart failure)  Poor historian  Alcohol abuse  Chronic atrial fibrillation  S/P BKA (below knee amputation), left: secondary to worsening infection in lower leg. Had both ankle injuries from fall s/p repair - left had complication.  Poor historian  S/P BKA (below knee amputation) unilateral, left      Allergies    Ceclor (Rash)    Intolerances      MEDICATIONS  (STANDING):  ALBUTerol/ipratropium for Nebulization 3 milliLiter(s) Nebulizer every 6 hours  aspirin  chewable 81 milliGRAM(s) Oral daily  chlorhexidine 0.12% Liquid 15 milliLiter(s) Swish and Spit two times a day  diltiazem Injectable 10 milliGRAM(s) IV Push once  enoxaparin Injectable 40 milliGRAM(s) SubCutaneous daily  folic acid 1 milliGRAM(s) Oral daily  furosemide    Tablet 20 milliGRAM(s) Oral daily  influenza   Vaccine 0.5 milliLiter(s) IntraMuscular once  lactulose Syrup 10 Gram(s) Oral every 12 hours  lisinopril 5 milliGRAM(s) Oral daily  metoprolol    tartrate Injectable 5 milliGRAM(s) IV Push every 6 hours  multiple electrolytes Injection Type 1 Bolus 1000 milliLiter(s) IV Bolus once  multiple electrolytes Injection Type 1 Bolus 1000 milliLiter(s) IV Bolus once  multivitamin 1 Tablet(s) Oral daily  nicotine - 21 mG/24Hr(s) Patch 1 patch Transdermal daily  pantoprazole  Injectable 40 milliGRAM(s) IV Push daily  piperacillin/tazobactam IVPB. 3.375 Gram(s) IV Intermittent every 8 hours  tamsulosin 0.4 milliGRAM(s) Oral at bedtime    MEDICATIONS  (PRN):  acetaminophen    Suspension 650 milliGRAM(s) Oral every 6 hours PRN For Temp greater than 38 C (100.4 F)  ALBUTerol    0.083% 2.5 milliGRAM(s) Nebulizer every 4 hours PRN Shortness of Breath and/or Wheezing  bisacodyl Suppository 10 milliGRAM(s) Rectal daily PRN Constipation  ondansetron Injectable 8 milliGRAM(s) IV Push every 6 hours PRN Vomiting      Vital Signs Last 24 Hrs  T(F): 103.4F rectally  HR: 111  BP: 114/55  RR: 22   SpO2: 97% on Vent    Daily   I&O's Summary    12 Nov 2017 07:01  -  13 Nov 2017 07:00  --------------------------------------------------------  IN: 850 mL / OUT: 0 mL / NET: 850 mL        PHYSICAL EXAM:    GENERAL: patient is non-verbal w trach in place, warm to the touch, agitated w wrist restraints.     NECK: Supple, No JVD  NERVOUS SYSTEM:  Alert and awake,  Grossly moving all extremities   CHEST/LUNG: right lower rales, moderate rhonchi throughout b/l lung fields, no wheezing, or rubs  HEART: Iregular rate and rhythm; No murmurs, rubs, or gallops  ABDOMEN: difficult to assess as patient is non-verbal w Trach, obese abdomen, Soft, Nondistended; Bowel sounds present  EXTREMITIES:  left BKA, 2+ Peripheral Pulses, No clubbing, cyanosis, +2 right LE edema  SKIN: warm, turgor good,  capillary refill  <2 sec      Assessment-  1. Sepsis- a new suspected infection + 2 of ( Temp >101/ HR 90 or more/ RR >20/ WBC >12 K or < 4K/ Bands > 5% )      -----------------------------------------------------------------------------------------------------------------------------------------------------------------------------------  Assessment-  1. Sepsis                                                                                                                                             Time    08:42                  Intervention-  STAT Labs- CBC, CMP, S Lactate, Blood C/S x 2, PT/ PTT/ INR, UA, Urine C/S, CXR                          Time drawn  08:55  Antibiotic -  Vanco/Zosyn was started 24hrs. earlier  Will keep same Abx for now.                         Time started zosyn 11am on 11/12 vanco at 18:00 on 11/12      Intervention - Fluids                                                                                                              Time started  08:51  Crystalloid fluid( Plasmalyte)  Rate (30ml/Kg in 500ml boluses Q 15 mins until goal)  Total Volume- 3L      If Lactate >2 repeat after fluid bolus    1. Sepsis likely 2/2 tracheal aspiration  Plan:  -CBC, BMP, procalcitonin, CRP, lactate, blood cultures, urine culture,  repeat CXR  EKG: afib  3L bolus of plasmalyte  Attending: Dr. Ortiz at bedside  ICU reconsult as patient had just been transferred out of MICU onto medical [facility].  Patient may need higher level of care; SDU or MICU since patient is w afib and w trach on vent.  maintain same Abx; Vanco/zosyn as they were just started 1 day prior. Code Sepsis called for Temp 103F rectal, , RR 20, /55    HPI:  62 years old male with PMH of A. Fib (not on AC due to falls), CHF, COPD and Alcoholism brought by EMS with shortness of breath. As per patient, he was drinking alcohol earlier today when he started having difficulty in breathing - so he called EMS. Denies chest pain, palpitations, cough, nausea, vomiting or dizziness. He has orthopnea and paroxysmal nocturnal dyspnea which are chronic. He also has chronic leg edema on right side - denies any change in it. He smokes 2 packs of cigarette per day for a long time. He is complaining of left shoulder pain - aggravates with shoulder movements. Does not remember if he got shoulder injury.   He was admitted at [facility] in August when he was treated for CHF and COPD. He was told that he also has cirrhosis likely secondary to alcoholism.   He has been in alcohol rehab in past and is willing to discuss that option again. (28 Oct 2017 01:28)    PAST MEDICAL & SURGICAL HISTORY:  Falls  Meningitis  Collapsed lung  Alcohol withdrawal  Emphysema of lung  ETOH abuse  Cirrhosis  Afib  CHF (congestive heart failure)  Poor historian  Alcohol abuse  Chronic atrial fibrillation  S/P BKA (below knee amputation), left: secondary to worsening infection in lower leg. Had both ankle injuries from fall s/p repair - left had complication.  Poor historian  S/P BKA (below knee amputation) unilateral, left      Allergies    Ceclor (Rash)    Intolerances      MEDICATIONS  (STANDING):  ALBUTerol/ipratropium for Nebulization 3 milliLiter(s) Nebulizer every 6 hours  aspirin  chewable 81 milliGRAM(s) Oral daily  chlorhexidine 0.12% Liquid 15 milliLiter(s) Swish and Spit two times a day  diltiazem Injectable 10 milliGRAM(s) IV Push once  enoxaparin Injectable 40 milliGRAM(s) SubCutaneous daily  folic acid 1 milliGRAM(s) Oral daily  furosemide    Tablet 20 milliGRAM(s) Oral daily  influenza   Vaccine 0.5 milliLiter(s) IntraMuscular once  lactulose Syrup 10 Gram(s) Oral every 12 hours  lisinopril 5 milliGRAM(s) Oral daily  metoprolol    tartrate Injectable 5 milliGRAM(s) IV Push every 6 hours  multiple electrolytes Injection Type 1 Bolus 1000 milliLiter(s) IV Bolus once  multiple electrolytes Injection Type 1 Bolus 1000 milliLiter(s) IV Bolus once  multivitamin 1 Tablet(s) Oral daily  nicotine - 21 mG/24Hr(s) Patch 1 patch Transdermal daily  pantoprazole  Injectable 40 milliGRAM(s) IV Push daily  piperacillin/tazobactam IVPB. 3.375 Gram(s) IV Intermittent every 8 hours  tamsulosin 0.4 milliGRAM(s) Oral at bedtime    MEDICATIONS  (PRN):  acetaminophen    Suspension 650 milliGRAM(s) Oral every 6 hours PRN For Temp greater than 38 C (100.4 F)  ALBUTerol    0.083% 2.5 milliGRAM(s) Nebulizer every 4 hours PRN Shortness of Breath and/or Wheezing  bisacodyl Suppository 10 milliGRAM(s) Rectal daily PRN Constipation  ondansetron Injectable 8 milliGRAM(s) IV Push every 6 hours PRN Vomiting      Vital Signs Last 24 Hrs  T(F): 103.4F rectally  HR: 111  BP: 114/55  RR: 22   SpO2: 97% on Vent    Daily   I&O's Summary    12 Nov 2017 07:01  -  13 Nov 2017 07:00  --------------------------------------------------------  IN: 850 mL / OUT: 0 mL / NET: 850 mL        PHYSICAL EXAM:    GENERAL: patient is non-verbal w trach in place, warm to the touch, agitated w wrist restraints.     NECK: Supple, No JVD  NERVOUS SYSTEM:  Alert and awake,  Grossly moving all extremities   CHEST/LUNG: right lower rales, moderate rhonchi throughout b/l lung fields, no wheezing, or rubs  HEART: Iregular rate and rhythm; No murmurs, rubs, or gallops  ABDOMEN: difficult to assess as patient is non-verbal w Trach, obese abdomen, Soft, Nondistended; Bowel sounds present  EXTREMITIES:  left BKA, 2+ Peripheral Pulses, No clubbing, cyanosis, +2 right LE edema  SKIN: warm, turgor good,  capillary refill  <2 sec      Assessment-  1. Sepsis- a new suspected infection + 2 of ( Temp >101/ HR 90 or more/ RR >20/ WBC >12 K or < 4K/ Bands > 5% )      -----------------------------------------------------------------------------------------------------------------------------------------------------------------------------------  Assessment-  1. Sepsis                                                                                                                                             Time    08:42                  Intervention-  STAT Labs- CBC, CMP, S Lactate, Blood C/S x 2, PT/ PTT/ INR, UA, Urine C/S, CXR                          Time drawn  08:55  Antibiotic -  Vanco/Zosyn was started 24hrs. earlier  Will keep same Abx for now.                         Time started zosyn 11am on 11/12 vanco at 18:00 on 11/12      Intervention - Fluids                                                                                                              Time started  08:51  Crystalloid fluid( Plasmalyte)  Rate (30ml/Kg in 500ml boluses Q 15 mins until goal)  Total Volume- 3L      If Lactate >2 repeat after fluid bolus    1. Sepsis likely 2/2 tracheal aspiration  Plan:  -CBC, BMP, procalcitonin, CRP, lactate, blood cultures, urine culture,  repeat CXR  EKG: afib  3L bolus of plasmalyte  Attending: Dr. Ortiz at bedside; aware and agrees w plan.  ICU reconsult as patient had just been transferred out of MICU onto medical flood.  Patient may need higher level of care; MICU since patient is w afib and w trach on vent w sepsis.  Has been admitted to MICU again.  maintain same Abx; Vanco/zosyn as they were just started 1 day prior.

## 2017-11-13 NOTE — PROGRESS NOTE ADULT - SUBJECTIVE AND OBJECTIVE BOX
[facility]  ----------------------------------------  CC:  LETHARGY and SEPSIS    Pt had code sepsis with RRT this morning when I went to see him  Blood cultures pending  Called ICU eval  Blood gases, X ray chest and BMP pending  Temp spike 103 this am    on full VENT SUPPORT    Vital Signs Last 24 Hrs  T(C): 39.7 (13 Nov 2017 08:40), Max: 39.7 (13 Nov 2017 08:40)  T(F): 103.4 (13 Nov 2017 08:40), Max: 103.4 (13 Nov 2017 08:40)  HR: 140 (13 Nov 2017 08:40) (83 - 140)  BP: 114/55 (13 Nov 2017 08:40) (114/55 - 140/80)  BP(mean): --  RR: 22 (13 Nov 2017 08:40) (18 - 31)  SpO2: 97% (13 Nov 2017 08:40) (92% - 98%)    CAPILLARY BLOOD GLUCOSE      POCT Blood Glucose.: 124 mg/dL (13 Nov 2017 08:45)    PHYSICAL EXAMINATION:  ----------------------------------------    General appearance: lethargic   HEENT: + trach, connected to vent   Neck: Supple, No JVD  Lungs: decreased breath sounds bilaterally with congested breathing   Cardiovascular: S1S2, Regular rhythm  Abdomen: Soft, Nontender, Positive bowel sounds  Extremities: No clubbing, No cyanosis, No edema    LABORATORY STUDIES:  ----------------------------------------                        11.1   12.4  )-----------( 277      ( 13 Nov 2017 08:01 )             36.5     11-13    149<H>  |  110<H>  |  29.0<H>  ----------------------------<  121<H>  3.7   |  27.0  |  0.79    Ca    9.3      13 Nov 2017 08:01  Phos  3.5     11-12  Mg     2.0     11-12    TPro  6.9  /  Alb  3.2<L>  /  TBili  0.8  /  DBili  x   /  AST  15  /  ALT  10  /  AlkPhos  140<H>  11-12    LIVER FUNCTIONS - ( 12 Nov 2017 09:09 )  Alb: 3.2 g/dL / Pro: 6.9 g/dL / ALK PHOS: 140 U/L / ALT: 10 U/L / AST: 15 U/L / GGT: x           PT/INR - ( 12 Nov 2017 12:51 )   PT: 13.5 sec;   INR: 1.22 ratio         PTT - ( 12 Nov 2017 12:51 )  PTT:30.5 sec    MEDICATIONS  (STANDING):    ALBUTerol/ipratropium for Nebulization 3 milliLiter(s) Nebulizer every 6 hours  aspirin  chewable 81 milliGRAM(s) Oral daily  chlorhexidine 0.12% Liquid 15 milliLiter(s) Swish and Spit two times a day  diltiazem Injectable 10 milliGRAM(s) IV Push once  enoxaparin Injectable 40 milliGRAM(s) SubCutaneous daily  folic acid 1 milliGRAM(s) Oral daily  furosemide    Tablet 20 milliGRAM(s) Oral daily  influenza   Vaccine 0.5 milliLiter(s) IntraMuscular once  lactulose Syrup 10 Gram(s) Oral every 12 hours  lisinopril 5 milliGRAM(s) Oral daily  metoprolol    tartrate Injectable 5 milliGRAM(s) IV Push every 6 hours  multiple electrolytes Injection Type 1 Bolus 1000 milliLiter(s) IV Bolus once  multiple electrolytes Injection Type 1 Bolus 1000 milliLiter(s) IV Bolus once  multivitamin 1 Tablet(s) Oral daily  nicotine - 21 mG/24Hr(s) Patch 1 patch Transdermal daily  pantoprazole  Injectable 40 milliGRAM(s) IV Push daily  piperacillin/tazobactam IVPB. 3.375 Gram(s) IV Intermittent every 8 hours  tamsulosin 0.4 milliGRAM(s) Oral at bedtime    MEDICATIONS  (PRN):  acetaminophen    Suspension 650 milliGRAM(s) Oral every 6 hours PRN For Temp greater than 38 C (100.4 F)  ALBUTerol    0.083% 2.5 milliGRAM(s) Nebulizer every 4 hours PRN Shortness of Breath and/or Wheezing  bisacodyl Suppository 10 milliGRAM(s) Rectal daily PRN Constipation  ondansetron Injectable 8 milliGRAM(s) IV Push every 6 hours PRN Vomiting      ASSESSMENT / PLAN:  ----------------------------------------

## 2017-11-14 DIAGNOSIS — I48.91 UNSPECIFIED ATRIAL FIBRILLATION: ICD-10-CM

## 2017-11-14 DIAGNOSIS — E87.0 HYPEROSMOLALITY AND HYPERNATREMIA: ICD-10-CM

## 2017-11-14 LAB
ANION GAP SERPL CALC-SCNC: 13 MMOL/L — SIGNIFICANT CHANGE UP (ref 5–17)
BUN SERPL-MCNC: 27 MG/DL — HIGH (ref 8–20)
CALCIUM SERPL-MCNC: 8.9 MG/DL — SIGNIFICANT CHANGE UP (ref 8.6–10.2)
CHLORIDE SERPL-SCNC: 112 MMOL/L — HIGH (ref 98–107)
CO2 SERPL-SCNC: 26 MMOL/L — SIGNIFICANT CHANGE UP (ref 22–29)
CREAT SERPL-MCNC: 0.64 MG/DL — SIGNIFICANT CHANGE UP (ref 0.5–1.3)
GLUCOSE BLDC GLUCOMTR-MCNC: 116 MG/DL — HIGH (ref 70–99)
GLUCOSE BLDC GLUCOMTR-MCNC: 120 MG/DL — HIGH (ref 70–99)
GLUCOSE BLDC GLUCOMTR-MCNC: 92 MG/DL — SIGNIFICANT CHANGE UP (ref 70–99)
GLUCOSE BLDC GLUCOMTR-MCNC: 99 MG/DL — SIGNIFICANT CHANGE UP (ref 70–99)
GLUCOSE SERPL-MCNC: 116 MG/DL — HIGH (ref 70–115)
HCT VFR BLD CALC: 35.1 % — LOW (ref 42–52)
HGB BLD-MCNC: 11 G/DL — LOW (ref 14–18)
MAGNESIUM SERPL-MCNC: 2 MG/DL — SIGNIFICANT CHANGE UP (ref 1.6–2.6)
MCHC RBC-ENTMCNC: 30.1 PG — SIGNIFICANT CHANGE UP (ref 27–31)
MCHC RBC-ENTMCNC: 31.3 G/DL — LOW (ref 32–36)
MCV RBC AUTO: 95.9 FL — HIGH (ref 80–94)
PHOSPHATE SERPL-MCNC: 2.3 MG/DL — LOW (ref 2.4–4.7)
PLATELET # BLD AUTO: 188 K/UL — SIGNIFICANT CHANGE UP (ref 150–400)
POTASSIUM SERPL-MCNC: 4.1 MMOL/L — SIGNIFICANT CHANGE UP (ref 3.5–5.3)
POTASSIUM SERPL-SCNC: 4.1 MMOL/L — SIGNIFICANT CHANGE UP (ref 3.5–5.3)
RBC # BLD: 3.66 M/UL — LOW (ref 4.6–6.2)
RBC # FLD: 16.2 % — HIGH (ref 11–15.6)
SODIUM SERPL-SCNC: 151 MMOL/L — HIGH (ref 135–145)
WBC # BLD: 6.9 K/UL — SIGNIFICANT CHANGE UP (ref 4.8–10.8)
WBC # FLD AUTO: 6.9 K/UL — SIGNIFICANT CHANGE UP (ref 4.8–10.8)

## 2017-11-14 PROCEDURE — 71010: CPT | Mod: 26

## 2017-11-14 PROCEDURE — 76700 US EXAM ABDOM COMPLETE: CPT | Mod: 26

## 2017-11-14 PROCEDURE — 99233 SBSQ HOSP IP/OBS HIGH 50: CPT

## 2017-11-14 PROCEDURE — 99223 1ST HOSP IP/OBS HIGH 75: CPT

## 2017-11-14 PROCEDURE — 76705 ECHO EXAM OF ABDOMEN: CPT | Mod: 26

## 2017-11-14 RX ORDER — POTASSIUM PHOSPHATE, MONOBASIC POTASSIUM PHOSPHATE, DIBASIC 236; 224 MG/ML; MG/ML
15 INJECTION, SOLUTION INTRAVENOUS ONCE
Qty: 0 | Refills: 0 | Status: COMPLETED | OUTPATIENT
Start: 2017-11-14 | End: 2017-11-14

## 2017-11-14 RX ORDER — LACTULOSE 10 G/15ML
10 SOLUTION ORAL EVERY 12 HOURS
Qty: 0 | Refills: 0 | Status: DISCONTINUED | OUTPATIENT
Start: 2017-11-14 | End: 2017-11-24

## 2017-11-14 RX ORDER — SODIUM CHLORIDE 9 MG/ML
1000 INJECTION, SOLUTION INTRAVENOUS
Qty: 0 | Refills: 0 | Status: DISCONTINUED | OUTPATIENT
Start: 2017-11-14 | End: 2017-11-16

## 2017-11-14 RX ADMIN — SODIUM CHLORIDE 75 MILLILITER(S): 9 INJECTION, SOLUTION INTRAVENOUS at 10:13

## 2017-11-14 RX ADMIN — POTASSIUM PHOSPHATE, MONOBASIC POTASSIUM PHOSPHATE, DIBASIC 62.5 MILLIMOLE(S): 236; 224 INJECTION, SOLUTION INTRAVENOUS at 10:13

## 2017-11-14 RX ADMIN — Medication 1 PATCH: at 11:43

## 2017-11-14 RX ADMIN — ENOXAPARIN SODIUM 40 MILLIGRAM(S): 100 INJECTION SUBCUTANEOUS at 11:31

## 2017-11-14 RX ADMIN — PIPERACILLIN AND TAZOBACTAM 25 GRAM(S): 4; .5 INJECTION, POWDER, LYOPHILIZED, FOR SOLUTION INTRAVENOUS at 06:04

## 2017-11-14 RX ADMIN — Medication 650 MILLIGRAM(S): at 14:53

## 2017-11-14 RX ADMIN — Medication 3 MILLILITER(S): at 14:38

## 2017-11-14 RX ADMIN — Medication 1 MILLIGRAM(S): at 11:31

## 2017-11-14 RX ADMIN — Medication 5 MILLIGRAM(S): at 06:04

## 2017-11-14 RX ADMIN — Medication 3 MILLILITER(S): at 08:22

## 2017-11-14 RX ADMIN — CHLORHEXIDINE GLUCONATE 15 MILLILITER(S): 213 SOLUTION TOPICAL at 06:04

## 2017-11-14 RX ADMIN — Medication 1 PATCH: at 11:31

## 2017-11-14 RX ADMIN — Medication 5 MILLIGRAM(S): at 18:19

## 2017-11-14 RX ADMIN — Medication 20 MILLIGRAM(S): at 06:05

## 2017-11-14 RX ADMIN — Medication 3 MILLILITER(S): at 20:48

## 2017-11-14 RX ADMIN — Medication 1 PATCH: at 04:47

## 2017-11-14 RX ADMIN — SODIUM CHLORIDE 75 MILLILITER(S): 9 INJECTION, SOLUTION INTRAVENOUS at 22:31

## 2017-11-14 RX ADMIN — Medication 1 TABLET(S): at 11:30

## 2017-11-14 RX ADMIN — Medication 5 MILLIGRAM(S): at 00:29

## 2017-11-14 RX ADMIN — Medication 650 MILLIGRAM(S): at 04:18

## 2017-11-14 RX ADMIN — Medication 3 MILLILITER(S): at 02:56

## 2017-11-14 RX ADMIN — PIPERACILLIN AND TAZOBACTAM 25 GRAM(S): 4; .5 INJECTION, POWDER, LYOPHILIZED, FOR SOLUTION INTRAVENOUS at 14:32

## 2017-11-14 RX ADMIN — PIPERACILLIN AND TAZOBACTAM 25 GRAM(S): 4; .5 INJECTION, POWDER, LYOPHILIZED, FOR SOLUTION INTRAVENOUS at 22:30

## 2017-11-14 RX ADMIN — Medication 81 MILLIGRAM(S): at 11:31

## 2017-11-14 RX ADMIN — CHLORHEXIDINE GLUCONATE 15 MILLILITER(S): 213 SOLUTION TOPICAL at 18:19

## 2017-11-14 RX ADMIN — Medication 5 MILLIGRAM(S): at 11:31

## 2017-11-14 RX ADMIN — LISINOPRIL 5 MILLIGRAM(S): 2.5 TABLET ORAL at 06:05

## 2017-11-14 RX ADMIN — PANTOPRAZOLE SODIUM 40 MILLIGRAM(S): 20 TABLET, DELAYED RELEASE ORAL at 11:30

## 2017-11-14 NOTE — CONSULT NOTE ADULT - SUBJECTIVE AND OBJECTIVE BOX
HPI:  62 years old male with PMH of A. Fib (not on AC due to falls), CHF, COPD and Alcoholism brought by EMS with shortness of breath. As per patient, he was drinking alcohol earlier today when he started having difficulty in breathing - so he called EMS. Denies chest pain, palpitations, cough, nausea, vomiting or dizziness. He has orthopnea and paroxysmal nocturnal dyspnea which are chronic. He also has chronic leg edema on right side - denies any change in it. He smokes 2 packs of cigarette per day for a long time. He is complaining of left shoulder pain - aggravates with shoulder movements. Does not remember if he got shoulder injury.   He was admitted at [facility] in August when he was treated for CHF and COPD. He was told that he also has cirrhosis likely secondary to alcoholism.   He has been in alcohol rehab in past and is willing to discuss that option again. (28 Oct 2017 01:28)      PAST MEDICAL & SURGICAL HISTORY:  Falls  Meningitis  Collapsed lung  Alcohol withdrawal  Emphysema of lung  ETOH abuse  Cirrhosis  Afib  CHF (congestive heart failure)  Poor historian  Alcohol abuse  Chronic atrial fibrillation  S/P BKA (below knee amputation), left: secondary to worsening infection in lower leg. Had both ankle injuries from fall s/p repair - left had complication.  S/P BKA (below knee amputation) unilateral, left      ROS:  No Heartburn, regurgitation, dysphagia, odynophagia.  No dyspepsia  No abdominal pain.    No Nausea, vomiting.  No Bleeding.  No hematemesis.   No diarrhea.    No hematochesia.  No weight loss, anorexia.  No edema.      MEDICATIONS  (STANDING):  ALBUTerol/ipratropium for Nebulization 3 milliLiter(s) Nebulizer every 6 hours  aspirin  chewable 81 milliGRAM(s) Oral daily  chlorhexidine 0.12% Liquid 15 milliLiter(s) Swish and Spit two times a day  diltiazem    Tablet 30 milliGRAM(s) Oral three times a day  enoxaparin Injectable 40 milliGRAM(s) SubCutaneous daily  folic acid 1 milliGRAM(s) Oral daily  influenza   Vaccine 0.5 milliLiter(s) IntraMuscular once  insulin regular  human corrective regimen sliding scale   SubCutaneous every 6 hours  lisinopril 5 milliGRAM(s) Oral daily  metoprolol    tartrate Injectable 5 milliGRAM(s) IV Push every 6 hours  multivitamin 1 Tablet(s) Oral daily  nicotine - 21 mG/24Hr(s) Patch 1 patch Transdermal daily  pantoprazole  Injectable 40 milliGRAM(s) IV Push daily  piperacillin/tazobactam IVPB. 3.375 Gram(s) IV Intermittent every 8 hours  sodium chloride 0.45%. 1000 milliLiter(s) (75 mL/Hr) IV Continuous <Continuous>  tamsulosin 0.4 milliGRAM(s) Oral at bedtime    MEDICATIONS  (PRN):  acetaminophen    Suspension 650 milliGRAM(s) Oral every 6 hours PRN For Temp greater than 38 C (100.4 F)  ALBUTerol    0.083% 2.5 milliGRAM(s) Nebulizer every 4 hours PRN Shortness of Breath and/or Wheezing  bisacodyl Suppository 10 milliGRAM(s) Rectal daily PRN Constipation  haloperidol    Injectable 2 milliGRAM(s) IV Push every 6 hours PRN agitation  lactulose Syrup 10 Gram(s) Oral every 12 hours PRN No bowel Movements for 24 hours  ondansetron Injectable 8 milliGRAM(s) IV Push every 6 hours PRN Vomiting      Allergies    Ceclor (Rash)    Intolerances        SOCIAL HISTORY:    ENDOSCOPIC/GI HISTORY:    FAMILY HISTORY:  Family history unknown: Adopted  No pertinent family history in first degree relatives      Vital Signs Last 24 Hrs  T(C): 37.9 (2017 09:00), Max: 38.9 (2017 03:30)  T(F): 100.2 (2017 09:00), Max: 102 (2017 03:30)  HR: 100 (2017 12:00) (96 - 137)  BP: 97/56 (2017 12:00) (92/56 - 145/67)  BP(mean): 73 (2017 12:00) (66 - 97)  RR: 20 (2017 12:00) (20 - 33)  SpO2: 99% (2017 12:00) (85% - 100%)    PHYSICAL EXAM:    GENERAL: NAD, well-groomed, well-developed  HEAD:  Atraumatic, Normocephalic  EYES: EOMI, PERRLA, conjunctiva and sclera clear  ENMT: No tonsillar erythema, exudates, or enlargement; Moist mucous membranes, Good dentition, No lesions  NECK: Supple, No JVD, Normal thyroid  CHEST/LUNG: Clear to percussion bilaterally; No rales, rhonchi, wheezing, or rubs  HEART: Regular rate and rhythm; No murmurs, rubs, or gallops  ABDOMEN: Soft, Nontender, Nondistended; Bowel sounds present  EXTREMITIES:  2+ Peripheral Pulses, No clubbing, cyanosis, or edema  LYMPH: No lymphadenopathy noted  SKIN: No rashes or lesions      LABS:                        11.0   6.9   )-----------( 188      ( 2017 03:42 )             35.1     11-14    151<H>  |  112<H>  |  27.0<H>  ----------------------------<  116<H>  4.1   |  26.0  |  0.64    Ca    8.9      2017 03:42  Phos  2.3       Mg     2.0         TPro  6.7  /  Alb  2.9<L>  /  TBili  1.6  /  DBili  x   /  AST  17  /  ALT  10  /  AlkPhos  114         Urinalysis Basic - ( 2017 12:46 )    Color: Yellow / Appearance: Clear / S.020 / pH: x  Gluc: x / Ketone: Negative  / Bili: Negative / Urobili: 4 mg/dL   Blood: x / Protein: 30 mg/dL / Nitrite: Negative   Leuk Esterase: Trace / RBC: x / WBC 3-5   Sq Epi: x / Non Sq Epi: x / Bacteria: Occasional        LIVER FUNCTIONS - ( 2017 09:33 )  Alb: 2.9 g/dL / Pro: 6.7 g/dL / ALK PHOS: 114 U/L / ALT: 10 U/L / AST: 17 U/L / GGT: x               RADIOLOGY & ADDITIONAL STUDIES: HPI:  61 y/o M with a h/o COPD, a-fib (no a/c), CHF, alcoholic cirrhosis, EtOH abuse, EtOH withdrawal (intubated in past for airway protection), initially admitted on 10/28 for COPD exacerbation and alcohol intoxication, signed out AMA and was found later on in hospital disoriented. Readmitted and RRT called later on when patient became tremulous, agitated, delirious, and began hallucinating. Transferred to MICU for further management with benzos and precedex gtt. Patient became obtunded, lost ability to protect airway, and was subsequently intubated. As per report, patient is highly sensitive to benzodiazepines.  CT head neg for acute events. Course complicated by a-fib with RVR, sepsis, pseudomonas aspiration pneumonia. Self extubated on  without need for reintubation initially but on the morning of  patient obtunded  with difficulty managing secretions. Reintubated for airway protection. Pt has had recurrent ruptured  balloons and ETT breakage.  He had failed his weaning trials and had a tracheostomy placed on Thurs. .  Brought back to the ICU secondary to for fever, likely micro-aspiration, svt needed monitor only place for monitor and vent bed in the hospital is the ICU      PAST MEDICAL & SURGICAL HISTORY:  Falls  Meningitis  Collapsed lung  Alcohol withdrawal  Emphysema of lung  ETOH abuse  Cirrhosis  Afib  CHF (congestive heart failure)  Poor historian  Alcohol abuse  Chronic atrial fibrillation  S/P BKA (below knee amputation), left: secondary to worsening infection in lower leg. Had both ankle injuries from fall s/p repair - left had complication.  S/P BKA (below knee amputation) unilateral, left      ROS:  No Heartburn, regurgitation, dysphagia, odynophagia.  No dyspepsia  No abdominal pain.    No Nausea, vomiting.  No Bleeding.  No hematemesis.   No diarrhea.    No hematochesia.  No weight loss, anorexia.  No edema.      MEDICATIONS  (STANDING):  ALBUTerol/ipratropium for Nebulization 3 milliLiter(s) Nebulizer every 6 hours  aspirin  chewable 81 milliGRAM(s) Oral daily  chlorhexidine 0.12% Liquid 15 milliLiter(s) Swish and Spit two times a day  diltiazem    Tablet 30 milliGRAM(s) Oral three times a day  enoxaparin Injectable 40 milliGRAM(s) SubCutaneous daily  folic acid 1 milliGRAM(s) Oral daily  influenza   Vaccine 0.5 milliLiter(s) IntraMuscular once  insulin regular  human corrective regimen sliding scale   SubCutaneous every 6 hours  lisinopril 5 milliGRAM(s) Oral daily  metoprolol    tartrate Injectable 5 milliGRAM(s) IV Push every 6 hours  multivitamin 1 Tablet(s) Oral daily  nicotine - 21 mG/24Hr(s) Patch 1 patch Transdermal daily  pantoprazole  Injectable 40 milliGRAM(s) IV Push daily  piperacillin/tazobactam IVPB. 3.375 Gram(s) IV Intermittent every 8 hours  sodium chloride 0.45%. 1000 milliLiter(s) (75 mL/Hr) IV Continuous <Continuous>  tamsulosin 0.4 milliGRAM(s) Oral at bedtime    MEDICATIONS  (PRN):  acetaminophen    Suspension 650 milliGRAM(s) Oral every 6 hours PRN For Temp greater than 38 C (100.4 F)  ALBUTerol    0.083% 2.5 milliGRAM(s) Nebulizer every 4 hours PRN Shortness of Breath and/or Wheezing  bisacodyl Suppository 10 milliGRAM(s) Rectal daily PRN Constipation  haloperidol    Injectable 2 milliGRAM(s) IV Push every 6 hours PRN agitation  lactulose Syrup 10 Gram(s) Oral every 12 hours PRN No bowel Movements for 24 hours  ondansetron Injectable 8 milliGRAM(s) IV Push every 6 hours PRN Vomiting      Allergies    Ceclor (Rash)    Intolerances        SOCIAL HISTORY:    ENDOSCOPIC/GI HISTORY:    FAMILY HISTORY:  Family history unknown: Adopted  No pertinent family history in first degree relatives      Vital Signs Last 24 Hrs  T(C): 37.9 (2017 09:00), Max: 38.9 (2017 03:30)  T(F): 100.2 (2017 09:00), Max: 102 (2017 03:30)  HR: 100 (2017 12:00) (96 - 137)  BP: 97/56 (2017 12:00) (92/56 - 145/67)  BP(mean): 73 (2017 12:00) (66 - 97)  RR: 20 (2017 12:00) (20 - 33)  SpO2: 99% (2017 12:00) (85% - 100%)    PHYSICAL EXAM:    GENERAL: NAD, well-groomed, well-developed  HEAD:  Atraumatic, Normocephalic  EYES: EOMI, PERRLA, conjunctiva and sclera clear  ENMT: No tonsillar erythema, exudates, or enlargement; Moist mucous membranes, Good dentition, No lesions  NECK: Supple, No JVD, Normal thyroid  CHEST/LUNG: Clear to percussion bilaterally; No rales, rhonchi, wheezing, or rubs  HEART: Regular rate and rhythm; No murmurs, rubs, or gallops  ABDOMEN: Soft, Nontender, Nondistended; Bowel sounds present  EXTREMITIES:  2+ Peripheral Pulses, No clubbing, cyanosis, or edema  LYMPH: No lymphadenopathy noted  SKIN: No rashes or lesions      LABS:                        11.0   6.9   )-----------( 188      ( 2017 03:42 )             35.1     11-14    151<H>  |  112<H>  |  27.0<H>  ----------------------------<  116<H>  4.1   |  26.0  |  0.64    Ca    8.9      2017 03:42  Phos  2.3     11-14  Mg     2.0     11-14    TPro  6.7  /  Alb  2.9<L>  /  TBili  1.6  /  DBili  x   /  AST  17  /  ALT  10  /  AlkPhos  114  11-13       Urinalysis Basic - ( 2017 12:46 )    Color: Yellow / Appearance: Clear / S.020 / pH: x  Gluc: x / Ketone: Negative  / Bili: Negative / Urobili: 4 mg/dL   Blood: x / Protein: 30 mg/dL / Nitrite: Negative   Leuk Esterase: Trace / RBC: x / WBC 3-5   Sq Epi: x / Non Sq Epi: x / Bacteria: Occasional        LIVER FUNCTIONS - ( 2017 09:33 )  Alb: 2.9 g/dL / Pro: 6.7 g/dL / ALK PHOS: 114 U/L / ALT: 10 U/L / AST: 17 U/L / GGT: x           Acute Hepatitis Panel (17 @ 06:43)    Hepatitis C Virus Interpretation: Nonreact: Hepatitis C AB  S/CO Ratio                        Interpretation  < 1.0                                     Non-Reactive  1.0 - 4.9                           Weakly-Reactive  > 5.0                                 Reactive  Non-Reactive: A person witha non-reactive HCV antibody result is  considered uninfected.  No further action is needed unless recent  infection is suspected.  In these cases, consider repeat testing later to  detect seroconversion..  Weakly-Reactive: HCV antibody test is abnormal, HCV RNA Qualitative test  will follow.  Reactive: HCV antibody test is abnormal, HCV RNA Qualitative test will  follow.  Note: HCV antibody testing is performed on the Abbott  system.    Hepatitis C Virus S/CO Ratio: 0.09 S/CO    Hepatitis B Core IgM Antibody: Nonreact    Hepatitis B Surface Antigen: Nonreact    Hepatitis A IgM Antibody: Nonreact        RADIOLOGY & ADDITIONAL STUDIES:  < from: Xray Chest 1 View AP/PA. (17 @ 14:43) >    Findings:  A nasogastric tube has been inserted since the prior examination. The tip   is not visualized however is located inferior to the diaphragm.   Persistent cardiomegaly. No evidence ofpneumothorax..    Impression:  NG tube insertion since the prior exam..    < end of copied text >  < from: CT Angio Chest w/ IV Cont (17 @ 17:10) >  IMPRESSION:    No evidence of central pulmonary emboli. Peripheral lower lobe branches   cannot be adequately evaluated due to poor opacification..  Gross cardiomegaly.  Mediastinal left hilar adenopathy.  Bilateral mild pleural effusions with left greater than right bibasilar   airspace consolidations.    < end of copied text > HPI:  61 y/o M with a h/o COPD, a-fib (no a/c), CHF, alcoholic cirrhosis, EtOH abuse, EtOH withdrawal (intubated in past for airway protection), initially admitted on 10/28 for COPD exacerbation and alcohol intoxication, signed out AMA and was found later on in hospital disoriented. Readmitted and RRT called later on when patient became tremulous, agitated, delirious, and began hallucinating. Transferred to MICU for further management with benzos and precedex gtt. Patient became obtunded, lost ability to protect airway, and was subsequently intubated. As per report, patient is highly sensitive to benzodiazepines.  CT head neg for acute events. Course complicated by a-fib with RVR, sepsis, pseudomonas aspiration pneumonia. Self extubated on  without need for reintubation initially but on the morning of  patient obtunded  with difficulty managing secretions. Reintubated for airway protection. Pt has had recurrent ruptured  balloons and ETT breakage.  He had failed his weaning trials and had a tracheostomy placed on Thurs. .  Brought back to the ICU secondary to for fever, likely micro-aspiration, svt needed monitor only place for monitor and vent bed in the hospital is the ICU.    At this time, patient was getting NG tube feeding at this time. Passing brown colored bowel movement. He is on 2 point restraints.       PAST MEDICAL & SURGICAL HISTORY:  Falls  Meningitis  Collapsed lung  Alcohol withdrawal  Emphysema of lung  ETOH abuse  Cirrhosis  Afib  CHF (congestive heart failure)  Poor historian  Alcohol abuse  Chronic atrial fibrillation  S/P BKA (below knee amputation), left: secondary to worsening infection in lower leg. Had both ankle injuries from fall s/p repair - left had complication.  S/P BKA (below knee amputation) unilateral, left      ROS:  Could not be obtained.    MEDICATIONS  (STANDING):  ALBUTerol/ipratropium for Nebulization 3 milliLiter(s) Nebulizer every 6 hours  aspirin  chewable 81 milliGRAM(s) Oral daily  chlorhexidine 0.12% Liquid 15 milliLiter(s) Swish and Spit two times a day  diltiazem    Tablet 30 milliGRAM(s) Oral three times a day  enoxaparin Injectable 40 milliGRAM(s) SubCutaneous daily  folic acid 1 milliGRAM(s) Oral daily  influenza   Vaccine 0.5 milliLiter(s) IntraMuscular once  insulin regular  human corrective regimen sliding scale   SubCutaneous every 6 hours  lisinopril 5 milliGRAM(s) Oral daily  metoprolol    tartrate Injectable 5 milliGRAM(s) IV Push every 6 hours  multivitamin 1 Tablet(s) Oral daily  nicotine - 21 mG/24Hr(s) Patch 1 patch Transdermal daily  pantoprazole  Injectable 40 milliGRAM(s) IV Push daily  piperacillin/tazobactam IVPB. 3.375 Gram(s) IV Intermittent every 8 hours  sodium chloride 0.45%. 1000 milliLiter(s) (75 mL/Hr) IV Continuous <Continuous>  tamsulosin 0.4 milliGRAM(s) Oral at bedtime    MEDICATIONS  (PRN):  acetaminophen    Suspension 650 milliGRAM(s) Oral every 6 hours PRN For Temp greater than 38 C (100.4 F)  ALBUTerol    0.083% 2.5 milliGRAM(s) Nebulizer every 4 hours PRN Shortness of Breath and/or Wheezing  bisacodyl Suppository 10 milliGRAM(s) Rectal daily PRN Constipation  haloperidol    Injectable 2 milliGRAM(s) IV Push every 6 hours PRN agitation  lactulose Syrup 10 Gram(s) Oral every 12 hours PRN No bowel Movements for 24 hours  ondansetron Injectable 8 milliGRAM(s) IV Push every 6 hours PRN Vomiting      Allergies    Ceclor (Rash)    Intolerances        SOCIAL HISTORY:Alcohol and tobacco abuse.     ENDOSCOPIC/GI HISTORY: Unknown    FAMILY HISTORY:  Family history unknown: Adopted  No pertinent family history in first degree relatives      Vital Signs Last 24 Hrs  T(C): 37.9 (2017 09:00), Max: 38.9 (2017 03:30)  T(F): 100.2 (2017 09:00), Max: 102 (2017 03:30)  HR: 100 (2017 12:00) (96 - 137)  BP: 97/56 (2017 12:00) (92/56 - 145/67)  BP(mean): 73 (2017 12:00) (66 - 97)  RR: 20 (2017 12:00) (20 - 33)  SpO2: 99% (2017 12:00) (85% - 100%)    PHYSICAL EXAM:    GENERAL: Tracheostomy in place. Awake but confused.  HEAD:  Atraumatic, Normocephalic  EYES: EOMI, PERRLA, conjunctiva and sclera clear  ENMT: No tonsillar erythema, exudates, or enlargement; Moist mucous membranes, Good dentition, No lesions  NECK: Supple, No JVD, Normal thyroid  CHEST/LUNG: Clear to percussion bilaterally; No rales, rhonchi, wheezing, or rubs  HEART: Regular rate and rhythm; No murmurs, rubs, or gallops  ABDOMEN: Soft, Nontender, Nondistended; Bowel sounds present  RECTAL: Brown colored stools  EXTREMITIES:  2+ Peripheral Pulses, No clubbing, cyanosis, or edema  LYMPH: No lymphadenopathy noted  SKIN: No rashes or lesions      LABS:                        11.0   6.9   )-----------( 188      ( 2017 03:42 )             35.1     -14    151<H>  |  112<H>  |  27.0<H>  ----------------------------<  116<H>  4.1   |  26.0  |  0.64    Ca    8.9      2017 03:42  Phos  2.3     -  Mg     2.0         TPro  6.7  /  Alb  2.9<L>  /  TBili  1.6  /  DBili  x   /  AST  17  /  ALT  10  /  AlkPhos  114  11-13       Urinalysis Basic - ( 2017 12:46 )    Color: Yellow / Appearance: Clear / S.020 / pH: x  Gluc: x / Ketone: Negative  / Bili: Negative / Urobili: 4 mg/dL   Blood: x / Protein: 30 mg/dL / Nitrite: Negative   Leuk Esterase: Trace / RBC: x / WBC 3-5   Sq Epi: x / Non Sq Epi: x / Bacteria: Occasional      Prothrombin Time and INR, Plasma in AM (17 @ 12:51)    Prothrombin Time, Plasma: 13.5: Effective , the reference range for PT has changed. sec    INR: 1.22: RECOMMENDED RANGES FOR THERAPEUTIC INR:    2.0-3.0 for most medical and surgical thromboembolic states    2.0-3.0 for atrial fibrillation    2.0-3.0 for bileaflet mechanical valve in aortic position    2.5-3.5 for mechanical heart valves   Chest 2004;126:[id number]  The presence of direct thrombin inhibitors (argatroban, refludan)  may falsely increase results. ratio      LIVER FUNCTIONS - ( 2017 09:33 )  Alb: 2.9 g/dL / Pro: 6.7 g/dL / ALK PHOS: 114 U/L / ALT: 10 U/L / AST: 17 U/L / GGT: x           Acute Hepatitis Panel (17 @ 06:43)    Hepatitis C Virus Interpretation: Nonreact: Hepatitis C AB  S/CO Ratio                        Interpretation  < 1.0                                     Non-Reactive  1.0 - 4.9                           Weakly-Reactive  > 5.0                                 Reactive  Non-Reactive: A person witha non-reactive HCV antibody result is  considered uninfected.  No further action is needed unless recent  infection is suspected.  In these cases, consider repeat testing later to  detect seroconversion..  Weakly-Reactive: HCV antibody test is abnormal, HCV RNA Qualitative test  will follow.  Reactive: HCV antibody test is abnormal, HCV RNA Qualitative test will  follow.  Note: HCV antibody testing is performed on the Abbott  system.    Hepatitis C Virus S/CO Ratio: 0.09 S/CO    Hepatitis B Core IgM Antibody: Nonreact    Hepatitis B Surface Antigen: Nonreact    Hepatitis A IgM Antibody: Nonreact        RADIOLOGY & ADDITIONAL STUDIES:  < from: Xray Chest 1 View AP/PA. (17 @ 14:43) >    Findings:  A nasogastric tube has been inserted since the prior examination. The tip   is not visualized however is located inferior to the diaphragm.   Persistent cardiomegaly. No evidence ofpneumothorax..    Impression:  NG tube insertion since the prior exam..    < end of copied text >  < from: CT Angio Chest w/ IV Cont (17 @ 17:10) >  IMPRESSION:    No evidence of central pulmonary emboli. Peripheral lower lobe branches   cannot be adequately evaluated due to poor opacification..  Gross cardiomegaly.  Mediastinal left hilar adenopathy.  Bilateral mild pleural effusions with left greater than right bibasilar   airspace consolidations.    < end of copied text >

## 2017-11-14 NOTE — PROGRESS NOTE ADULT - PROBLEM SELECTOR PLAN 2
1 bcx CONS likely contaminate, repeat bx on 11/13 pending, sputum cx still in process doubt pna likely can dc zosyn in am, patietn likely microaspirating cause of possible fever

## 2017-11-14 NOTE — PROGRESS NOTE ADULT - SUBJECTIVE AND OBJECTIVE BOX
Patient is a 62y old  Male who presents with a chief complaint of Shortness of breath (30 Oct 2017 15:03)      BRIEF HOSPITAL COURSE: 63 y/o M with a h/o COPD, a-fib (no a/c), CHF, alcoholic cirrhosis, EtOH abuse, EtOH withdrawal (intubated in past for airway protection), initially admitted on 10/28 for COPD exacerbation and alcohol intoxication, signed out AMA and was found later on in hospital disoriented. Readmitted and RRT called later on when patient became tremulous, agitated, delirious, and began hallucinating. Transferred to MICU for further management with benzos and precedex gtt. Patient became obtunded, lost ability to protect airway, and was subsequently intubated. As per report, patient is highly sensitive to benzodiazepines.  CT head neg for acute events. Course complicated by a-fib with RVR, sepsis, pseudomonas aspiration pneumonia. Self extubated on  without need for reintubation initially but on the morning of  patient obtunded  with difficulty managing secretions. Reintubated for airway protection. Pt has had recurrent ruptured  balloons and ETT breakage.  He had failed his weaning trials and had a tracheostomy placed on Thurs. .  Brought back to the ICU secondary to for fever, likely micro-aspiration, svt needed monitor only place for monitor and vent bed in the hospital is the ICU    Events last 24 hours:     PAST MEDICAL & SURGICAL HISTORY:  Falls  Meningitis  Collapsed lung  Alcohol withdrawal  Emphysema of lung  ETOH abuse  Cirrhosis  Afib  CHF (congestive heart failure)  Poor historian  Alcohol abuse  Chronic atrial fibrillation  S/P BKA (below knee amputation), left: secondary to worsening infection in lower leg. Had both ankle injuries from fall s/p repair - left had complication.  S/P BKA (below knee amputation) unilateral, left      Review of Systems:  CONSTITUTIONAL: No fever, chills, or fatigue  EYES: No eye pain, visual disturbances, or discharge  ENMT:  No difficulty hearing, tinnitus, vertigo; No sinus or throat pain  NECK: No pain or stiffness  RESPIRATORY: No cough, wheezing, chills or hemoptysis; No shortness of breath  CARDIOVASCULAR: No chest pain, palpitations, dizziness, or leg swelling  GASTROINTESTINAL: No abdominal or epigastric pain. No nausea, vomiting, or hematemesis; No diarrhea or constipation. No melena or hematochezia.  GENITOURINARY: No dysuria, frequency, hematuria, or incontinence  NEUROLOGICAL: No headaches, memory loss, loss of strength, numbness, or tremors  SKIN: No itching, burning, rashes, or lesions   MUSCULOSKELETAL: No joint pain or swelling; No muscle, back, or extremity pain  PSYCHIATRIC: No depression, anxiety, mood swings, or difficulty sleeping      Medications:  piperacillin/tazobactam IVPB. 3.375 Gram(s) IV Intermittent every 8 hours    lisinopril 5 milliGRAM(s) Oral daily  metoprolol    tartrate Injectable 5 milliGRAM(s) IV Push every 6 hours  tamsulosin 0.4 milliGRAM(s) Oral at bedtime    ALBUTerol    0.083% 2.5 milliGRAM(s) Nebulizer every 4 hours PRN  ALBUTerol/ipratropium for Nebulization 3 milliLiter(s) Nebulizer every 6 hours    acetaminophen    Suspension 650 milliGRAM(s) Oral every 6 hours PRN  haloperidol    Injectable 2 milliGRAM(s) IV Push every 6 hours PRN  ondansetron Injectable 8 milliGRAM(s) IV Push every 6 hours PRN      aspirin  chewable 81 milliGRAM(s) Oral daily  enoxaparin Injectable 40 milliGRAM(s) SubCutaneous daily    bisacodyl Suppository 10 milliGRAM(s) Rectal daily PRN  lactulose Syrup 10 Gram(s) Oral every 12 hours  pantoprazole  Injectable 40 milliGRAM(s) IV Push daily      insulin regular  human corrective regimen sliding scale   SubCutaneous every 6 hours    folic acid 1 milliGRAM(s) Oral daily  multivitamin 1 Tablet(s) Oral daily  sodium chloride 0.45%. 1000 milliLiter(s) IV Continuous <Continuous>    influenza   Vaccine 0.5 milliLiter(s) IntraMuscular once    chlorhexidine 0.12% Liquid 15 milliLiter(s) Swish and Spit two times a day    nicotine - 21 mG/24Hr(s) Patch 1 patch Transdermal daily      Mode: CPAP with PS  PEEP: 5  PS: 10      ICU Vital Signs Last 24 Hrs  T(C): 38.9 (2017 03:30), Max: 39.7 (2017 08:40)  T(F): 102 (2017 03:30), Max: 103.4 (2017 08:40)  HR: 130 (2017 05:00) (96 - 146)  BP: 128/68 (2017 04:35) (93/61 - 145/67)  BP(mean): 91 (2017 04:35) (67 - 97)  ABP: --  ABP(mean): --  RR: 31 (2017 05:00) (22 - 32)  SpO2: 99% (2017 05:00) (85% - 100%)      ABG - ( 2017 12:03 )  pH: 7.45  /  pCO2: 40    /  pO2: 78    / HCO3: 28    / Base Excess: 3.7   /  SaO2: 96                  I&O's Detail    2017 07:01  -  2017 07:00  --------------------------------------------------------  IN:    Free Water: 250 mL    Glucerna: 595 mL    multiple electrolytes Injection Type 1multiple electrolytes Injection Type 1: 2375 mL    Solution: 200 mL    Solution: 1000 mL    Solution: 100 mL  Total IN: 4520 mL    OUT:  Total OUT: 0 mL    Total NET: 4520 mL            LABS:                        11.0   6.9   )-----------( 188      ( 2017 03:42 )             35.1     -    151<H>  |  112<H>  |  27.0<H>  ----------------------------<  116<H>  4.1   |  26.0  |  0.64    Ca    8.9      2017 03:42  Phos  2.3     -  Mg     2.0     -    TPro  6.7  /  Alb  2.9<L>  /  TBili  1.6  /  DBili  x   /  AST  17  /  ALT  10  /  AlkPhos  114  11-13          CAPILLARY BLOOD GLUCOSE  116 (2017 03:30)      POCT Blood Glucose.: 120 mg/dL (2017 07:28)    PT/INR - ( 2017 12:51 )   PT: 13.5 sec;   INR: 1.22 ratio         PTT - ( 2017 12:51 )  PTT:30.5 sec  Urinalysis Basic - ( 2017 12:46 )    Color: Yellow / Appearance: Clear / S.020 / pH: x  Gluc: x / Ketone: Negative  / Bili: Negative / Urobili: 4 mg/dL   Blood: x / Protein: 30 mg/dL / Nitrite: Negative   Leuk Esterase: Trace / RBC: x / WBC 3-5   Sq Epi: x / Non Sq Epi: x / Bacteria: Occasional      CULTURES:  Culture Results:   Growth in anaerobic bottle: Gram Positive Cocci in Clusters  Anaerobic Bottle: 27:25 Hours to positivity  Aerobic Bottle: No growth to date  .  ***Blood Panel PCR results on this specimen are available  approximately 3 hours after the Gram stain result.***  Gram stain, PCR, and/or culture results may not always  correspond due to difference in methodologies.  ************************************************************  This PCR assay was performed using [employer].  The following targets are tested for: Enterococcus,  vancomycin resistant enterococci, Listeria monocytogenes,  coagulase negative staphylococci, S. aureus,  methicillin resistant S. aureus, Streptococcus agalactiae  (Group B), S. pneumoniae, S. pyogenes (Group A),  Acinetobacter baumannii, Enterobacter cloacae, E. coli,  Klebsiella oxytoca, K. pneumoniae, Proteus sp.,  Serratia marcescens, Haemophilus influenzae,  Neisseria meningitidis, Pseudomonas aeruginosa, Candida  albicans, C. glabrata, C krusei, C parapsilosis,  C. tropicalis and the KPC resistance gene.  .  TYPE: (C=Critical, N=Notification, A=Abnormal) C  TESTS:  _ bld   DATE/TIME CALLED: _ 2017 15:17:52  CALLED TO: Ana Michelle RN  READ BACK (2 Patient Identifiers)(Y/N): _ Y  READ BACK VALUES (Y/N): _ Y  CALLED BY: _ shira (17 @ 10:58)  Culture Results:   No growth (17 @ 23:23)  Culture Results:   Rare Pseudomonas aeruginosa  Rare Routine respiratory raymundo present (17 @ 15:50)  Culture Results:   No growth at 5 days. (17 @ 15:48)  Culture Results:   No growth at 5 days. (17 @ 15:47)      Physical Examination:    General: No acute distress.  Alert, oriented, interactive, nonfocal    HEENT: Pupils equal, reactive to light.  Symmetric.    PULM: Clear to auscultation bilaterally, no significant sputum production    CVS: Regular rate and rhythm, no murmurs, rubs, or gallops    ABD: Soft, nondistended, nontender, normoactive bowel sounds, no masses    EXT: No edema, nontender    SKIN: Warm and well perfused, no rashes noted.    RADIOLOGY: ***    CRITICAL CARE TIME SPENT: *** Patient is a 62y old  Male who presents with a chief complaint of Shortness of breath (30 Oct 2017 15:03)      BRIEF HOSPITAL COURSE: 61 y/o M with a h/o COPD, a-fib (no a/c), CHF, alcoholic cirrhosis, EtOH abuse, EtOH withdrawal (intubated in past for airway protection), initially admitted on 10/28 for COPD exacerbation and alcohol intoxication, signed out AMA and was found later on in hospital disoriented. Readmitted and RRT called later on when patient became tremulous, agitated, delirious, and began hallucinating. Transferred to MICU for further management with benzos and precedex gtt. Patient became obtunded, lost ability to protect airway, and was subsequently intubated. As per report, patient is highly sensitive to benzodiazepines.  CT head neg for acute events. Course complicated by a-fib with RVR, sepsis, pseudomonas aspiration pneumonia. Self extubated on  without need for reintubation initially but on the morning of  patient obtunded  with difficulty managing secretions. Reintubated for airway protection. Pt has had recurrent ruptured  balloons and ETT breakage.  He had failed his weaning trials and had a tracheostomy placed on Thurs. .  Brought back to the ICU secondary to for fever, likely micro-aspiration, svt needed monitor only place for monitor and vent bed in the hospital is the ICU    Events last 24 hours: periods of agitation haldol prn, last fever yesterday, failed SBT 15/ tachypnea    PAST MEDICAL & SURGICAL HISTORY:  Falls  Meningitis  Collapsed lung  Alcohol withdrawal  Emphysema of lung  ETOH abuse  Cirrhosis  Afib  CHF (congestive heart failure)  Poor historian  Alcohol abuse  Chronic atrial fibrillation  S/P BKA (below knee amputation), left: secondary to worsening infection in lower leg. Had both ankle injuries from fall s/p repair - left had complication.  S/P BKA (below knee amputation) unilateral, left      Review of Systems:  unable to obtain      Medications:  piperacillin/tazobactam IVPB. 3.375 Gram(s) IV Intermittent every 8 hours    lisinopril 5 milliGRAM(s) Oral daily  metoprolol    tartrate Injectable 5 milliGRAM(s) IV Push every 6 hours  tamsulosin 0.4 milliGRAM(s) Oral at bedtime    ALBUTerol    0.083% 2.5 milliGRAM(s) Nebulizer every 4 hours PRN  ALBUTerol/ipratropium for Nebulization 3 milliLiter(s) Nebulizer every 6 hours    acetaminophen    Suspension 650 milliGRAM(s) Oral every 6 hours PRN  haloperidol    Injectable 2 milliGRAM(s) IV Push every 6 hours PRN  ondansetron Injectable 8 milliGRAM(s) IV Push every 6 hours PRN      aspirin  chewable 81 milliGRAM(s) Oral daily  enoxaparin Injectable 40 milliGRAM(s) SubCutaneous daily    bisacodyl Suppository 10 milliGRAM(s) Rectal daily PRN  lactulose Syrup 10 Gram(s) Oral every 12 hours  pantoprazole  Injectable 40 milliGRAM(s) IV Push daily      insulin regular  human corrective regimen sliding scale   SubCutaneous every 6 hours    folic acid 1 milliGRAM(s) Oral daily  multivitamin 1 Tablet(s) Oral daily  sodium chloride 0.45%. 1000 milliLiter(s) IV Continuous <Continuous>    influenza   Vaccine 0.5 milliLiter(s) IntraMuscular once    chlorhexidine 0.12% Liquid 15 milliLiter(s) Swish and Spit two times a day    nicotine - 21 mG/24Hr(s) Patch 1 patch Transdermal daily      Mode: CPAP with PS  PEEP: 5  PS: 10      ICU Vital Signs Last 24 Hrs  T(C): 38.9 (2017 03:30), Max: 39.7 (2017 08:40)  T(F): 102 (2017 03:30), Max: 103.4 (2017 08:40)  HR: 130 (2017 05:00) (96 - 146)  BP: 128/68 (2017 04:35) (93/61 - 145/67)  BP(mean): 91 (2017 04:35) (67 - 97)  ABP: --  ABP(mean): --  RR: 31 (2017 05:00) (22 - 32)  SpO2: 99% (2017 05:00) (85% - 100%)      ABG - ( 2017 12:03 )  pH: 7.45  /  pCO2: 40    /  pO2: 78    / HCO3: 28    / Base Excess: 3.7   /  SaO2: 96                  I&O's Detail    2017 07:01  -  2017 07:00  --------------------------------------------------------  IN:    Free Water: 250 mL    Glucerna: 595 mL    multiple electrolytes Injection Type 1multiple electrolytes Injection Type 1: 2375 mL    Solution: 200 mL    Solution: 1000 mL    Solution: 100 mL  Total IN: 4520 mL    OUT:  Total OUT: 0 mL    Total NET: 4520 mL            LABS:                        11.0   6.9   )-----------( 188      ( 2017 03:42 )             35.1     11-14    151<H>  |  112<H>  |  27.0<H>  ----------------------------<  116<H>  4.1   |  26.0  |  0.64    Ca    8.9      2017 03:42  Phos  2.3     11-14  Mg     2.0     -    TPro  6.7  /  Alb  2.9<L>  /  TBili  1.6  /  DBili  x   /  AST  17  /  ALT  10  /  AlkPhos  114  11-13          CAPILLARY BLOOD GLUCOSE  116 (2017 03:30)      POCT Blood Glucose.: 120 mg/dL (2017 07:28)    PT/INR - ( 2017 12:51 )   PT: 13.5 sec;   INR: 1.22 ratio         PTT - ( 2017 12:51 )  PTT:30.5 sec  Urinalysis Basic - ( 2017 12:46 )    Color: Yellow / Appearance: Clear / S.020 / pH: x  Gluc: x / Ketone: Negative  / Bili: Negative / Urobili: 4 mg/dL   Blood: x / Protein: 30 mg/dL / Nitrite: Negative   Leuk Esterase: Trace / RBC: x / WBC 3-5   Sq Epi: x / Non Sq Epi: x / Bacteria: Occasional      CULTURES:  Culture Results:   Growth in anaerobic bottle: Gram Positive Cocci in Clusters  Anaerobic Bottle: 27:25 Hours to positivity  Aerobic Bottle: No growth to date  .  ***Blood Panel PCR results on this specimen are available  approximately 3 hours after the Gram stain result.***  Gram stain, PCR, and/or culture results may not always  correspond due to difference in methodologies.  ************************************************************  This PCR assay was performed using [email or web address].  The following targets are tested for: Enterococcus,  vancomycin resistant enterococci, Listeria monocytogenes,  coagulase negative staphylococci, S. aureus,  methicillin resistant S. aureus, Streptococcus agalactiae  (Group B), S. pneumoniae, S. pyogenes (Group A),  Acinetobacter baumannii, Enterobacter cloacae, E. coli,  Klebsiella oxytoca, K. pneumoniae, Proteus sp.,  Serratia marcescens, Haemophilus influenzae,  Neisseria meningitidis, Pseudomonas aeruginosa, Candida  albicans, C. glabrata, C krusei, C parapsilosis,  C. tropicalis and the KPC resistance gene.  .  TYPE: (C=Critical, N=Notification, A=Abnormal) C  TESTS:  _ bld   DATE/TIME CALLED: _ 2017 15:17:52  CALLED TO: Ana Michelle RN  READ BACK (2 Patient Identifiers)(Y/N): _ Y  READ BACK VALUES (Y/N): _ Y  CALLED BY: Ana silva (17 @ 10:58)  Culture Results:   No growth (17 @ 23:23)  Culture Results:   Rare Pseudomonas aeruginosa  Rare Routine respiratory raymundo present (17 @ 15:50)  Culture Results:   No growth at 5 days. (17 @ 15:48)  Culture Results:   No growth at 5 days. (17 @ 15:47)      Physical Examination:    General: No acute distress.  opens eyes to name, will answer yes and now at times    HEENT: Pupils equal, reactive to light.  Symmetric.    PULM: diminshed    CVS: afib    ABD: Soft, nondistended, nontender, normoactive bowel sounds, no masses    EXT: No edema, nontender    SKIN: Warm and well perfused, no rashes noted.    RADIOLOGY: < from: Xray Chest 1 View AP/PA. (17 @ 14:43) >   EXAM:  CHEST SINGLE VIEW FRONTAL                          PROCEDURE DATE:  2017          INTERPRETATION:  CHEST AP PORTABLE:    History: NGT Placement.     Date and time of exam: 2017 2:06 PM.    Technique: A single AP view of the chest was obtained.    Comparison exam: 2017 9:38 AM.    Findings:  A nasogastric tube has been inserted since the prior examination. The tip   is not visualized however is located inferior to the diaphragm.   Persistent cardiomegaly. No evidence ofpneumothorax..    Impression:  NG tube insertion since the prior exam..    < end of copied text >

## 2017-11-14 NOTE — CONSULT NOTE ADULT - ASSESSMENT
Patient with above mentioned co morbid conditions s/p tracheostomy for respiratory failure and now being evaluated for PEG tube placement    1. Obtain US abdomen to assess for ascites  2. Obtain INR  3. Continue  management for aspiration pneumonia Patient with above mentioned co morbid conditions s/p tracheostomy for respiratory failure and now being evaluated for PEG tube placement    1. Obtain US abdomen to assess for ascites  2. Obtain INR  3. Continue  management for aspiration pneumonia  4. As patient appears to be encephalopathic, continue lactulose and titrate to 2-3 soft BM/day  5. Will also continue MVI. Add thiamine

## 2017-11-14 NOTE — PROGRESS NOTE ADULT - ASSESSMENT
61 y/o M with a h/o COPD, a-fib (no a/c), CHF, alcoholic cirrhosis, EtOH abuse, EtOH withdrawal, acute on chronic respiratory failure, fever, encephalopathy

## 2017-11-15 DIAGNOSIS — R13.19 OTHER DYSPHAGIA: ICD-10-CM

## 2017-11-15 DIAGNOSIS — Z51.5 ENCOUNTER FOR PALLIATIVE CARE: ICD-10-CM

## 2017-11-15 LAB
-  AMIKACIN: SIGNIFICANT CHANGE UP
-  AMPICILLIN/SULBACTAM: SIGNIFICANT CHANGE UP
-  AZTREONAM: SIGNIFICANT CHANGE UP
-  CEFAZOLIN: SIGNIFICANT CHANGE UP
-  CEFEPIME: SIGNIFICANT CHANGE UP
-  CEFTAZIDIME: SIGNIFICANT CHANGE UP
-  CIPROFLOXACIN: SIGNIFICANT CHANGE UP
-  CIPROFLOXACIN: SIGNIFICANT CHANGE UP
-  CLINDAMYCIN: SIGNIFICANT CHANGE UP
-  ERYTHROMYCIN: SIGNIFICANT CHANGE UP
-  GENTAMICIN: SIGNIFICANT CHANGE UP
-  GENTAMICIN: SIGNIFICANT CHANGE UP
-  IMIPENEM: SIGNIFICANT CHANGE UP
-  LEVOFLOXACIN: SIGNIFICANT CHANGE UP
-  LEVOFLOXACIN: SIGNIFICANT CHANGE UP
-  MEROPENEM: SIGNIFICANT CHANGE UP
-  MOXIFLOXACIN(AEROBIC): SIGNIFICANT CHANGE UP
-  OXACILLIN: SIGNIFICANT CHANGE UP
-  PENICILLIN: SIGNIFICANT CHANGE UP
-  PIPERACILLIN/TAZOBACTAM: SIGNIFICANT CHANGE UP
-  RIFAMPIN: SIGNIFICANT CHANGE UP
-  TETRACYCLINE: SIGNIFICANT CHANGE UP
-  TOBRAMYCIN: SIGNIFICANT CHANGE UP
-  TRIMETHOPRIM/SULFAMETHOXAZOLE: SIGNIFICANT CHANGE UP
-  VANCOMYCIN: SIGNIFICANT CHANGE UP
ANION GAP SERPL CALC-SCNC: 10 MMOL/L — SIGNIFICANT CHANGE UP (ref 5–17)
BUN SERPL-MCNC: 21 MG/DL — HIGH (ref 8–20)
CALCIUM SERPL-MCNC: 8.7 MG/DL — SIGNIFICANT CHANGE UP (ref 8.6–10.2)
CHLORIDE SERPL-SCNC: 109 MMOL/L — HIGH (ref 98–107)
CO2 SERPL-SCNC: 28 MMOL/L — SIGNIFICANT CHANGE UP (ref 22–29)
CREAT SERPL-MCNC: 0.56 MG/DL — SIGNIFICANT CHANGE UP (ref 0.5–1.3)
CULTURE RESULTS: SIGNIFICANT CHANGE UP
GLUCOSE BLDC GLUCOMTR-MCNC: 108 MG/DL — HIGH (ref 70–99)
GLUCOSE BLDC GLUCOMTR-MCNC: 111 MG/DL — HIGH (ref 70–99)
GLUCOSE BLDC GLUCOMTR-MCNC: 126 MG/DL — HIGH (ref 70–99)
GLUCOSE BLDC GLUCOMTR-MCNC: 85 MG/DL — SIGNIFICANT CHANGE UP (ref 70–99)
GLUCOSE SERPL-MCNC: 114 MG/DL — SIGNIFICANT CHANGE UP (ref 70–115)
HCT VFR BLD CALC: 29.7 % — LOW (ref 42–52)
HGB BLD-MCNC: 9.6 G/DL — LOW (ref 14–18)
INR BLD: 1.23 RATIO — HIGH (ref 0.88–1.16)
MAGNESIUM SERPL-MCNC: 1.8 MG/DL — SIGNIFICANT CHANGE UP (ref 1.6–2.6)
MCHC RBC-ENTMCNC: 30.6 PG — SIGNIFICANT CHANGE UP (ref 27–31)
MCHC RBC-ENTMCNC: 32.3 G/DL — SIGNIFICANT CHANGE UP (ref 32–36)
MCV RBC AUTO: 94.6 FL — HIGH (ref 80–94)
METHOD TYPE: SIGNIFICANT CHANGE UP
METHOD TYPE: SIGNIFICANT CHANGE UP
ORGANISM # SPEC MICROSCOPIC CNT: SIGNIFICANT CHANGE UP
ORGANISM # SPEC MICROSCOPIC CNT: SIGNIFICANT CHANGE UP
PHOSPHATE SERPL-MCNC: 3.3 MG/DL — SIGNIFICANT CHANGE UP (ref 2.4–4.7)
PLATELET # BLD AUTO: 211 K/UL — SIGNIFICANT CHANGE UP (ref 150–400)
POTASSIUM SERPL-MCNC: 3.6 MMOL/L — SIGNIFICANT CHANGE UP (ref 3.5–5.3)
POTASSIUM SERPL-SCNC: 3.6 MMOL/L — SIGNIFICANT CHANGE UP (ref 3.5–5.3)
PROTHROM AB SERPL-ACNC: 13.6 SEC — HIGH (ref 9.8–12.7)
RBC # BLD: 3.14 M/UL — LOW (ref 4.6–6.2)
RBC # FLD: 16.1 % — HIGH (ref 11–15.6)
SODIUM SERPL-SCNC: 147 MMOL/L — HIGH (ref 135–145)
SPECIMEN SOURCE: SIGNIFICANT CHANGE UP
WBC # BLD: 4.4 K/UL — LOW (ref 4.8–10.8)
WBC # FLD AUTO: 4.4 K/UL — LOW (ref 4.8–10.8)

## 2017-11-15 PROCEDURE — 99223 1ST HOSP IP/OBS HIGH 75: CPT

## 2017-11-15 PROCEDURE — 99233 SBSQ HOSP IP/OBS HIGH 50: CPT

## 2017-11-15 PROCEDURE — 99232 SBSQ HOSP IP/OBS MODERATE 35: CPT

## 2017-11-15 RX ORDER — MAGNESIUM SULFATE 500 MG/ML
2 VIAL (ML) INJECTION ONCE
Qty: 0 | Refills: 0 | Status: COMPLETED | OUTPATIENT
Start: 2017-11-15 | End: 2017-11-15

## 2017-11-15 RX ADMIN — Medication 1 TABLET(S): at 12:00

## 2017-11-15 RX ADMIN — Medication 3 MILLILITER(S): at 08:58

## 2017-11-15 RX ADMIN — Medication 1 PATCH: at 11:51

## 2017-11-15 RX ADMIN — PIPERACILLIN AND TAZOBACTAM 25 GRAM(S): 4; .5 INJECTION, POWDER, LYOPHILIZED, FOR SOLUTION INTRAVENOUS at 14:03

## 2017-11-15 RX ADMIN — Medication 650 MILLIGRAM(S): at 14:49

## 2017-11-15 RX ADMIN — Medication 5 MILLIGRAM(S): at 21:56

## 2017-11-15 RX ADMIN — Medication 3 MILLILITER(S): at 21:33

## 2017-11-15 RX ADMIN — CHLORHEXIDINE GLUCONATE 15 MILLILITER(S): 213 SOLUTION TOPICAL at 05:12

## 2017-11-15 RX ADMIN — Medication 3 MILLILITER(S): at 16:13

## 2017-11-15 RX ADMIN — Medication 650 MILLIGRAM(S): at 05:12

## 2017-11-15 RX ADMIN — LISINOPRIL 5 MILLIGRAM(S): 2.5 TABLET ORAL at 05:12

## 2017-11-15 RX ADMIN — Medication 1 PATCH: at 11:43

## 2017-11-15 RX ADMIN — PIPERACILLIN AND TAZOBACTAM 25 GRAM(S): 4; .5 INJECTION, POWDER, LYOPHILIZED, FOR SOLUTION INTRAVENOUS at 06:02

## 2017-11-15 RX ADMIN — Medication 50 GRAM(S): at 08:34

## 2017-11-15 RX ADMIN — Medication 650 MILLIGRAM(S): at 21:55

## 2017-11-15 RX ADMIN — CHLORHEXIDINE GLUCONATE 15 MILLILITER(S): 213 SOLUTION TOPICAL at 18:30

## 2017-11-15 RX ADMIN — PANTOPRAZOLE SODIUM 40 MILLIGRAM(S): 20 TABLET, DELAYED RELEASE ORAL at 11:50

## 2017-11-15 RX ADMIN — Medication 1 MILLIGRAM(S): at 11:51

## 2017-11-15 RX ADMIN — Medication 5 MILLIGRAM(S): at 00:16

## 2017-11-15 RX ADMIN — Medication 3 MILLILITER(S): at 02:35

## 2017-11-15 RX ADMIN — Medication 5 MILLIGRAM(S): at 05:12

## 2017-11-15 RX ADMIN — ENOXAPARIN SODIUM 40 MILLIGRAM(S): 100 INJECTION SUBCUTANEOUS at 11:51

## 2017-11-15 RX ADMIN — Medication 81 MILLIGRAM(S): at 11:51

## 2017-11-15 RX ADMIN — Medication 5 MILLIGRAM(S): at 11:51

## 2017-11-15 NOTE — PROGRESS NOTE ADULT - SUBJECTIVE AND OBJECTIVE BOX
Patient is a 62y old  Male who presents with a chief complaint of Shortness of breath (30 Oct 2017 15:03)      BRIEF HOSPITAL COURSE:  61 y/o M with a h/o COPD, a-fib (no a/c), CHF, alcoholic cirrhosis, EtOH abuse, EtOH withdrawal (intubated in past for airway protection), initially admitted on 10/28 for COPD exacerbation and alcohol intoxication, signed out AMA and was found later on in hospital disoriented. Readmitted and RRT called later on when patient became tremulous, agitated, delirious, and began hallucinating. Transferred to MICU for further management with benzos and precedex gtt. Patient became obtunded, lost ability to protect airway, and was subsequently intubated. As per report, patient is highly sensitive to benzodiazepines.  CT head neg for acute events. Course complicated by a-fib with RVR, sepsis, pseudomonas aspiration pneumonia. Self extubated on 11/2 without need for reintubation initially but on the morning of 11/4 patient obtunded  with difficulty managing secretions. Reintubated for airway protection. Pt has had recurrent ruptured  balloons and ETT breakage.  He had failed his weaning trials and had a tracheostomy placed on Thurs. 11/9.  Brought back to the ICU secondary to for fever, likely micro-aspiration, svt needed monitor only place for monitor and vent bed in the hospital is the ICU      Events last 24 hours: no acute issues didn't sleep well     PAST MEDICAL & SURGICAL HISTORY:  Falls  Meningitis  Collapsed lung  Alcohol withdrawal  Emphysema of lung  ETOH abuse  Cirrhosis  Afib  CHF (congestive heart failure)  Poor historian  Alcohol abuse  Chronic atrial fibrillation  S/P BKA (below knee amputation), left: secondary to worsening infection in lower leg. Had both ankle injuries from fall s/p repair - left had complication.  S/P BKA (below knee amputation) unilateral, left      Review of Systems:  unable to obtain    Medications:  piperacillin/tazobactam IVPB. 3.375 Gram(s) IV Intermittent every 8 hours    diltiazem    Tablet 30 milliGRAM(s) Oral three times a day  lisinopril 5 milliGRAM(s) Oral daily  metoprolol    tartrate Injectable 5 milliGRAM(s) IV Push every 6 hours  tamsulosin 0.4 milliGRAM(s) Oral at bedtime    ALBUTerol    0.083% 2.5 milliGRAM(s) Nebulizer every 4 hours PRN  ALBUTerol/ipratropium for Nebulization 3 milliLiter(s) Nebulizer every 6 hours    acetaminophen    Suspension 650 milliGRAM(s) Oral every 6 hours PRN  haloperidol    Injectable 2 milliGRAM(s) IV Push every 6 hours PRN  ondansetron Injectable 8 milliGRAM(s) IV Push every 6 hours PRN      aspirin  chewable 81 milliGRAM(s) Oral daily  enoxaparin Injectable 40 milliGRAM(s) SubCutaneous daily    bisacodyl Suppository 10 milliGRAM(s) Rectal daily PRN  lactulose Syrup 10 Gram(s) Oral every 12 hours PRN  pantoprazole  Injectable 40 milliGRAM(s) IV Push daily      insulin regular  human corrective regimen sliding scale   SubCutaneous every 6 hours    folic acid 1 milliGRAM(s) Oral daily  multivitamin 1 Tablet(s) Oral daily  sodium chloride 0.45%. 1000 milliLiter(s) IV Continuous <Continuous>    influenza   Vaccine 0.5 milliLiter(s) IntraMuscular once    chlorhexidine 0.12% Liquid 15 milliLiter(s) Swish and Spit two times a day    nicotine - 21 mG/24Hr(s) Patch 1 patch Transdermal daily      Mode: AC/ CMV (Assist Control/ Continuous Mandatory Ventilation)  RR (machine): 12  TV (machine): 480  FiO2: 35  PEEP: 5  MAP: 9  PIP: 27      ICU Vital Signs Last 24 Hrs  T(C): 37.7 (15 Nov 2017 11:00), Max: 39.1 (14 Nov 2017 14:00)  T(F): 99.9 (15 Nov 2017 11:00), Max: 102.3 (14 Nov 2017 14:00)  HR: 113 (15 Nov 2017 13:00) (86 - 122)  BP: 126/64 (15 Nov 2017 13:00) (108/71 - 130/56)  BP(mean): 90 (15 Nov 2017 13:00) (76 - 94)  ABP: --  ABP(mean): --  RR: 25 (15 Nov 2017 13:00) (18 - 36)  SpO2: 98% (15 Nov 2017 13:00) (83% - 100%)          I&O's Detail    14 Nov 2017 07:01  -  15 Nov 2017 07:00  --------------------------------------------------------  IN:    Free Water: 1000 mL    Glucerna: 900 mL    sodium chloride 0.45%.: 1650 mL    Solution: 250 mL    Solution: 300 mL  Total IN: 4100 mL    OUT:    Incontinent per Condom Catheter: 500 mL  Total OUT: 500 mL    Total NET: 3600 mL      15 Nov 2017 07:01  -  15 Nov 2017 13:39  --------------------------------------------------------  IN:    Free Water: 250 mL    Glucerna: 360 mL    sodium chloride 0.45%.: 450 mL    Solution: 50 mL  Total IN: 1110 mL    OUT:  Total OUT: 0 mL    Total NET: 1110 mL            LABS:                        9.6    4.4   )-----------( 211      ( 15 Nov 2017 03:52 )             29.7     11-15    147<H>  |  109<H>  |  21.0<H>  ----------------------------<  114  3.6   |  28.0  |  0.56    Ca    8.7      15 Nov 2017 03:52  Phos  3.3     11-15  Mg     1.8     11-15            CAPILLARY BLOOD GLUCOSE  85 (15 Nov 2017 12:00)      POCT Blood Glucose.: 85 mg/dL (15 Nov 2017 13:04)    PT/INR - ( 15 Nov 2017 03:52 )   PT: 13.6 sec;   INR: 1.23 ratio             CULTURES:  Culture Results:   Numerous Pseudomonas aeruginosa  Moderate Yeast  No Routine respiratory raymundo present (11-13-17 @ 12:46)  Culture Results:   No growth at 48 hours (11-13-17 @ 09:16)  Culture Results:   No growth at 48 hours (11-13-17 @ 09:16)  Culture Results:   Growth in anaerobic bottle: Gram Positive Cocci in Clusters  Anaerobic Bottle: 27:25 Hours to positivity  Aerobic Bottle: No growth to date  .  ***Blood Panel PCR results on this specimen are available  approximately 3 hours after the Gram stain result.***  Gram stain, PCR, and/or culture results may not always  correspond due to difference in methodologies.  ************************************************************  This PCR assay was performed using [employer].  The following targets are tested for: Enterococcus,  vancomycin resistant enterococci, Listeria monocytogenes,  coagulase negative staphylococci, S. aureus,  methicillin resistant S. aureus, Streptococcus agalactiae  (Group B), S. pneumoniae, S. pyogenes (Group A),  Acinetobacter baumannii, Enterobacter cloacae, E. coli,  Klebsiella oxytoca, K. pneumoniae, Proteus sp.,  Serratia marcescens, Haemophilus influenzae,  Neisseria meningitidis, Pseudomonas aeruginosa, Candida  albicans, C. glabrata, C krusei, C parapsilosis,  C. tropicalis and the KPC resistance gene.  .  TYPE: (C=Critical, N=Notification, A=Abnormal) C  TESTS:  _ bld gs  DATE/TIME CALLED: _ 11/13/2017 15:17:52  < from: US Abdomen Complete (11.14.17 @ 14:49) >       EXAM:  US ABDOMEN COMPLETE                          PROCEDURE DATE:  11/14/2017          INTERPRETATION:  CLINICAL INFORMATION: Abdominal distention. Possible   ascites    COMPARISON: Abdominal x-ray dated 11/7/2017    TECHNIQUE: Sonography of the abdomen.     FINDINGS:     Limited examination was performed in all 4 quadrants. No evidence of   ascites.    IMPRESSION:     No evidence of ascites.    < end of copied text >  CALLED TO: Ana Michelle RN  READ BACK (2 Patient Identifiers)(Y/N): _ Y  READ BACK VALUES (Y/N): _ Y  CALLED BY: Ana silva (11-12-17 @ 10:58)  Culture Results:   No growth (11-08-17 @ 23:23)  Culture Results:   Rare Pseudomonas aeruginosa  Rare Routine respiratory raymundo present (11-08-17 @ 15:50)  Culture Results:   No growth at 5 days. (11-08-17 @ 15:48)  Culture Results:   No growth at 5 days. (11-08-17 @ 15:47)      Physical Examination:    General: No acute distress.  Alert, will occasionally follow commands    HEENT: Pupils equal, reactive to light.  Symmetric.    PULM: course    CVS: afib    ABD: Soft, nondistended, nontender, normoactive bowel sounds, no masses    EXT: No edema, nontender, left BKA    SKIN: Warm and well perfused, no rashes noted.

## 2017-11-15 NOTE — CONSULT NOTE ADULT - ASSESSMENT
62M with EtOH cirrhosis, CHF, COPD, respiratory failure post trach, dysphagia with NG tube, encephalopathy.

## 2017-11-15 NOTE — CONSULT NOTE ADULT - PROBLEM SELECTOR PROBLEM 1
Acute respiratory failure with hypoxia
Alcohol withdrawal delirium
Acute respiratory failure with hypoxia

## 2017-11-15 NOTE — CONSULT NOTE ADULT - PROBLEM SELECTOR RECOMMENDATION 2
continue rate control with metoprolol   due to history fo falls no anticoagulant
-continue to see how he progressed clinically

## 2017-11-15 NOTE — PROGRESS NOTE ADULT - SUBJECTIVE AND OBJECTIVE BOX
PULMONARY PROGRESS NOTE      [id number]    Patient is a 62y old  Male who presents with a chief complaint of Shortness of breath (30 Oct 2017 15:03)      INTERVAL HPI/OVERNIGHT EVENTS:REmains on vent.  SBT this AM 5/15 darryl well.  No further bleeding from trach.    MEDICATIONS  (STANDING):  ALBUTerol/ipratropium for Nebulization 3 milliLiter(s) Nebulizer every 6 hours  aspirin  chewable 81 milliGRAM(s) Oral daily  chlorhexidine 0.12% Liquid 15 milliLiter(s) Swish and Spit two times a day  diltiazem    Tablet 30 milliGRAM(s) Oral three times a day  enoxaparin Injectable 40 milliGRAM(s) SubCutaneous daily  folic acid 1 milliGRAM(s) Oral daily  influenza   Vaccine 0.5 milliLiter(s) IntraMuscular once  insulin regular  human corrective regimen sliding scale   SubCutaneous every 6 hours  lisinopril 5 milliGRAM(s) Oral daily  metoprolol    tartrate Injectable 5 milliGRAM(s) IV Push every 6 hours  multivitamin 1 Tablet(s) Oral daily  nicotine - 21 mG/24Hr(s) Patch 1 patch Transdermal daily  pantoprazole  Injectable 40 milliGRAM(s) IV Push daily  piperacillin/tazobactam IVPB. 3.375 Gram(s) IV Intermittent every 8 hours  sodium chloride 0.45%. 1000 milliLiter(s) (75 mL/Hr) IV Continuous <Continuous>  tamsulosin 0.4 milliGRAM(s) Oral at bedtime      MEDICATIONS  (PRN):  acetaminophen    Suspension 650 milliGRAM(s) Oral every 6 hours PRN For Temp greater than 38 C (100.4 F)  ALBUTerol    0.083% 2.5 milliGRAM(s) Nebulizer every 4 hours PRN Shortness of Breath and/or Wheezing  bisacodyl Suppository 10 milliGRAM(s) Rectal daily PRN Constipation  haloperidol    Injectable 2 milliGRAM(s) IV Push every 6 hours PRN agitation  lactulose Syrup 10 Gram(s) Oral every 12 hours PRN No bowel Movements for 24 hours  ondansetron Injectable 8 milliGRAM(s) IV Push every 6 hours PRN Vomiting      Allergies    Ceclor (Rash)    Intolerances        PAST MEDICAL & SURGICAL HISTORY:  Falls  Meningitis  Collapsed lung  Alcohol withdrawal  Emphysema of lung  ETOH abuse  Cirrhosis  Afib  CHF (congestive heart failure)  Poor historian  Alcohol abuse  Chronic atrial fibrillation  S/P BKA (below knee amputation), left: secondary to worsening infection in lower leg. Had both ankle injuries from fall s/p repair - left had complication.  S/P BKA (below knee amputation) unilateral, left      SOCIAL HISTORY  Smoking History:       REVIEW OF SYSTEMS:    CONSTITUTIONAL:  No distress    unable  Vital Signs Last 24 Hrs  T(C): 38.1 (15 Nov 2017 05:00), Max: 39.1 (2017 14:00)  T(F): 100.6 (15 Nov 2017 05:00), Max: 102.3 (2017 14:00)  HR: 101 (15 Nov 2017 09:04) (86 - 122)  BP: 112/67 (15 Nov 2017 08:00) (97/56 - 130/56)  BP(mean): 82 (15 Nov 2017 08:00) (73 - 94)  RR: 22 (15 Nov 2017 08:00) (18 - 33)  SpO2: 100% (15 Nov 2017 09:04) (83% - 100%)    PHYSICAL EXAMINATION:    GENERAL: Awake, noncommunicative    HEENT: Head is normocephalic and atraumatic. Extraocular muscles are intact. Mucous membranes are moist.    NECK: Supple.    LUNGS: scattered rhonchi.    HEART: Regular rate and rhythm without murmur.    ABDOMEN: Soft, nontender, and nondistended.      EXTREMITIES: Without any cyanosis, clubbing, rash, lesions or edema.    NEUROLOGIC: Grossly intact.    SKIN: No ulceration or induration present.      LABS:                        9.6    4.4   )-----------( 211      ( 15 Nov 2017 03:52 )             29.7     11-15    147<H>  |  109<H>  |  21.0<H>  ----------------------------<  114  3.6   |  28.0  |  0.56    Ca    8.7      15 Nov 2017 03:52  Phos  3.3     11-15  Mg     1.8     11-15      PT/INR - ( 15 Nov 2017 03:52 )   PT: 13.6 sec;   INR: 1.23 ratio           Urinalysis Basic - ( 2017 12:46 )    Color: Yellow / Appearance: Clear / S.020 / pH: x  Gluc: x / Ketone: Negative  / Bili: Negative / Urobili: 4 mg/dL   Blood: x / Protein: 30 mg/dL / Nitrite: Negative   Leuk Esterase: Trace / RBC: x / WBC 3-5   Sq Epi: x / Non Sq Epi: x / Bacteria: Occasional      ABG - ( 2017 12:03 )  pH: 7.45  /  pCO2: 40    /  pO2: 78    / HCO3: 28    / Base Excess: 3.7   /  SaO2: 96                          Procalcitonin, Serum: 0.05 ng/mL (17 @ 09:25)      MICROBIOLOGY:    RADIOLOGY & ADDITIONAL STUDIES:< from: Xray Chest 1 View AP/PA. (17 @ 14:43) >     EXAM:  CHEST SINGLE VIEW FRONTAL                          PROCEDURE DATE:  2017          INTERPRETATION:  CHEST AP PORTABLE:    History: NGT Placement.     Date and time of exam: 2017 2:06 PM.    Technique: A single AP view of the chest was obtained.    Comparison exam: 2017 9:38 AM.    Findings:  A nasogastric tube has been inserted since the prior examination. The tip   is not visualized however is located inferior to the diaphragm.   Persistent cardiomegaly. No evidence ofpneumothorax..    Impression:  NG tube insertion since the prior exam..                RAISA MULLER M.D., ATTENDING RADIOLOGIST  This document has been electronically signed. 2017  3:01PM              < end of copied text >

## 2017-11-15 NOTE — CONSULT NOTE ADULT - ATTENDING COMMENTS
Thank you for the opportunity to assist with the care of this patient.   [facility] [phone].

## 2017-11-15 NOTE — CONSULT NOTE ADULT - SUBJECTIVE AND OBJECTIVE BOX
HPI: 62M with PMH as listed admitted 10/28 with difficulty breathing. RRT called on 10/31 for agitation/delirium, alcohol withdrawal, transferred to MICU. Urgently intubated on 10/31 for altered mental status/obtundation/hypercapneic respiratory failure. Patient had been trached in the past.  Extubated, but required reintubation . Patient was being treated for aspiration pnuemonia, and subsequently received trach on 11/10. Now tolerating NG tube feeds, gi holding off on PEG for now to see if his mental state improves.     PERTINENT PMH REVIEWED: Yes     PAST MEDICAL & SURGICAL HISTORY:  Falls  Meningitis  Collapsed lung  Alcohol withdrawal  Emphysema of lung  ETOH abuse  Cirrhosis  Afib  CHF (congestive heart failure)  Poor historian  Alcohol abuse  Chronic atrial fibrillation  S/P BKA (below knee amputation), left: secondary to worsening infection in lower leg. Had both ankle injuries from fall s/p repair - left had complication.  S/P BKA (below knee amputation) unilateral, left    SOCIAL HISTORY:  smoker, EtOH                                    Admitted from:  home     Surrogate -    FAMILY HISTORY:  Family history unknown: Adopted  No pertinent family history in first degree relatives      Baseline ADLs (prior to admission):  Independent/ Dependent      Allergies    Ceclor (Rash)    Intolerances        Present Symptoms:     Dyspnea: 0 1 2 3   Nausea/Vomiting: Yes No  Anxiety:  Yes No  Depression: Yes No  Fatigue: Yes No  Loss of appetite: Yes No    Pain:             Character-            Duration-            Effect-            Factors-            Frequency-            Location-            Severity-    Review of Systems: Reviewed                     Negative:                     Positive:  Unable to obtain due to poor mentation   All others negative    MEDICATIONS  (STANDING):  ALBUTerol/ipratropium for Nebulization 3 milliLiter(s) Nebulizer every 6 hours  aspirin  chewable 81 milliGRAM(s) Oral daily  chlorhexidine 0.12% Liquid 15 milliLiter(s) Swish and Spit two times a day  diltiazem    Tablet 30 milliGRAM(s) Oral three times a day  enoxaparin Injectable 40 milliGRAM(s) SubCutaneous daily  folic acid 1 milliGRAM(s) Oral daily  influenza   Vaccine 0.5 milliLiter(s) IntraMuscular once  insulin regular  human corrective regimen sliding scale   SubCutaneous every 6 hours  lisinopril 5 milliGRAM(s) Oral daily  metoprolol    tartrate Injectable 5 milliGRAM(s) IV Push every 6 hours  multivitamin 1 Tablet(s) Oral daily  nicotine - 21 mG/24Hr(s) Patch 1 patch Transdermal daily  pantoprazole  Injectable 40 milliGRAM(s) IV Push daily  piperacillin/tazobactam IVPB. 3.375 Gram(s) IV Intermittent every 8 hours  sodium chloride 0.45%. 1000 milliLiter(s) (75 mL/Hr) IV Continuous <Continuous>  tamsulosin 0.4 milliGRAM(s) Oral at bedtime    MEDICATIONS  (PRN):  acetaminophen    Suspension 650 milliGRAM(s) Oral every 6 hours PRN For Temp greater than 38 C (100.4 F)  ALBUTerol    0.083% 2.5 milliGRAM(s) Nebulizer every 4 hours PRN Shortness of Breath and/or Wheezing  bisacodyl Suppository 10 milliGRAM(s) Rectal daily PRN Constipation  haloperidol    Injectable 2 milliGRAM(s) IV Push every 6 hours PRN agitation  lactulose Syrup 10 Gram(s) Oral every 12 hours PRN No bowel Movements for 24 hours  ondansetron Injectable 8 milliGRAM(s) IV Push every 6 hours PRN Vomiting      PHYSICAL EXAM:    Vital Signs Last 24 Hrs  T(C): 38.1 (15 Nov 2017 05:00), Max: 39.1 (2017 14:00)  T(F): 100.6 (15 Nov 2017 05:00), Max: 102.3 (2017 14:00)  HR: 105 (15 Nov 2017 11:00) (86 - 122)  BP: 117/67 (15 Nov 2017 11:00) (105/55 - 130/56)  BP(mean): 86 (15 Nov 2017 11:00) (75 - 94)  RR: 24 (15 Nov 2017 11:00) (18 - 33)  SpO2: 99% (15 Nov 2017 11:00) (83% - 100%)    General: alert  oriented x ____ lethargic agitated                  cachexia  nonverbal  coma    Karnofsky:  %    HEENT: normal  dry mouth  ET tube/trach    Lungs: comfortable tachypnea/labored breathing  excessive secretions    CV: normal  tachycardia    GI: normal  distended  tender  no BS               PEG/NG/OG tube  constipation  last BM:     : normal  incontinent  oliguria/anuria  blair    MSK: normal  weakness  edema             ambulatory  bedbound/wheelchair bound    Skin: normal  pressure ulcers- Stage_____  no rash    LABS:                        9.6    4.4   )-----------( 211      ( 15 Nov 2017 03:52 )             29.7     11-15    147<H>  |  109<H>  |  21.0<H>  ----------------------------<  114  3.6   |  28.0  |  0.56    Ca    8.7      15 Nov 2017 03:52  Phos  3.3     -15  Mg     1.8     11-15    PT/INR - ( 15 Nov 2017 03:52 )   PT: 13.6 sec;   INR: 1.23 ratio       Urinalysis Basic - ( 2017 12:46 )    Color: Yellow / Appearance: Clear / S.020 / pH: x  Gluc: x / Ketone: Negative  / Bili: Negative / Urobili: 4 mg/dL   Blood: x / Protein: 30 mg/dL / Nitrite: Negative   Leuk Esterase: Trace / RBC: x / WBC 3-5   Sq Epi: x / Non Sq Epi: x / Bacteria: Occasional    I&O's Summary    2017 07:01  -  15 Nov 2017 07:00  --------------------------------------------------------  IN: 4100 mL / OUT: 500 mL / NET: 3600 mL    RADIOLOGY & ADDITIONAL STUDIES:    < from: US Abdomen Complete (17 @ 14:49) >    No evidence of ascites.    < from: MRI Brain w/Diffusion (17 @ 16:51) >    IMPRESSION: No acute infarction.    < from: Xray Chest 1 View AP/PA. (17 @ 11:12) >  No acute pulmonary disease. Cardiomegaly.  No significant change from prior exam.    < from: Xray Abdomen 1 View- AP Only (17 @ 09:11) >  Nonspecific gas pattern.    ADVANCE DIRECTIVES: Full Code HPI: 62M with PMH as listed admitted 10/28 with difficulty breathing. RRT called on 10/31 for agitation/delirium, alcohol withdrawal, transferred to MICU. Urgently intubated on 10/31 for altered mental status/obtundation/hypercapneic respiratory failure. Patient had been trached in the past.  Extubated, but required reintubation . Patient was being treated for aspiration pnuemonia, and subsequently received trach on 11/10. Now tolerating NG tube feeds, gi holding off on PEG for now to see if his mental state improves.     PERTINENT PMH REVIEWED: Yes     PAST MEDICAL & SURGICAL HISTORY:  Falls  Meningitis  Collapsed lung  Alcohol withdrawal  Emphysema of lung  ETOH abuse  Cirrhosis  Afib  CHF (congestive heart failure)  Poor historian  Alcohol abuse  Chronic atrial fibrillation  S/P BKA (below knee amputation), left: secondary to worsening infection in lower leg. Had both ankle injuries from fall s/p repair - left had complication.  S/P BKA (below knee amputation) unilateral, left    SOCIAL HISTORY:  smoker, EtOH                                    Admitted from:  home     Surrogate -    FAMILY HISTORY:  Family history unknown: Adopted  No pertinent family history in first degree relatives    Baseline ADLs (prior to admission):  Independent    Allergies    Ceclor (Rash)    Present Symptoms:     Dyspnea: vented trach   Nausea/Vomiting: No  Anxiety:  Yes - agitated at times   Depression: No  Fatigue: No  Loss of appetite: No    Pain: none             Character-            Duration-            Effect-            Factors-            Frequency-            Location-            Severity-    Review of Systems: Reviewed                     Negative:                     Positive:  Unable to obtain due to poor mentation   All others negative    MEDICATIONS  (STANDING):  ALBUTerol/ipratropium for Nebulization 3 milliLiter(s) Nebulizer every 6 hours  aspirin  chewable 81 milliGRAM(s) Oral daily  chlorhexidine 0.12% Liquid 15 milliLiter(s) Swish and Spit two times a day  diltiazem    Tablet 30 milliGRAM(s) Oral three times a day  enoxaparin Injectable 40 milliGRAM(s) SubCutaneous daily  folic acid 1 milliGRAM(s) Oral daily  influenza   Vaccine 0.5 milliLiter(s) IntraMuscular once  insulin regular  human corrective regimen sliding scale   SubCutaneous every 6 hours  lisinopril 5 milliGRAM(s) Oral daily  metoprolol    tartrate Injectable 5 milliGRAM(s) IV Push every 6 hours  multivitamin 1 Tablet(s) Oral daily  nicotine - 21 mG/24Hr(s) Patch 1 patch Transdermal daily  pantoprazole  Injectable 40 milliGRAM(s) IV Push daily  piperacillin/tazobactam IVPB. 3.375 Gram(s) IV Intermittent every 8 hours  sodium chloride 0.45%. 1000 milliLiter(s) (75 mL/Hr) IV Continuous <Continuous>  tamsulosin 0.4 milliGRAM(s) Oral at bedtime    MEDICATIONS  (PRN):  acetaminophen    Suspension 650 milliGRAM(s) Oral every 6 hours PRN For Temp greater than 38 C (100.4 F)  ALBUTerol    0.083% 2.5 milliGRAM(s) Nebulizer every 4 hours PRN Shortness of Breath and/or Wheezing  bisacodyl Suppository 10 milliGRAM(s) Rectal daily PRN Constipation  haloperidol    Injectable 2 milliGRAM(s) IV Push every 6 hours PRN agitation  lactulose Syrup 10 Gram(s) Oral every 12 hours PRN No bowel Movements for 24 hours  ondansetron Injectable 8 milliGRAM(s) IV Push every 6 hours PRN Vomiting      PHYSICAL EXAM:    Vital Signs Last 24 Hrs  T(C): 38.1 (15 Nov 2017 05:00), Max: 39.1 (2017 14:00)  T(F): 100.6 (15 Nov 2017 05:00), Max: 102.3 (2017 14:00)  HR: 105 (15 Nov 2017 11:00) (86 - 122)  BP: 117/67 (15 Nov 2017 11:00) (105/55 - 130/56)  BP(mean): 86 (15 Nov 2017 11:00) (75 - 94)  RR: 24 (15 Nov 2017 11:00) (18 - 33)  SpO2: 99% (15 Nov 2017 11:00) (83% - 100%)    General: alert  oriented x ____ lethargic agitated                  cachexia  nonverbal  coma    Karnofsky:  %    HEENT: normal  dry mouth  ET tube/trach    Lungs: comfortable tachypnea/labored breathing  excessive secretions    CV: normal  tachycardia    GI: normal  distended  tender  no BS               PEG/NG/OG tube  constipation  last BM:     : normal  incontinent  oliguria/anuria  blair    MSK: normal  weakness  edema             ambulatory  bedbound/wheelchair bound    Skin: normal  pressure ulcers- Stage_____  no rash    LABS:                        9.6    4.4   )-----------( 211      ( 15 Nov 2017 03:52 )             29.7     11-15    147<H>  |  109<H>  |  21.0<H>  ----------------------------<  114  3.6   |  28.0  |  0.56    Ca    8.7      15 Nov 2017 03:52  Phos  3.3     11-15  Mg     1.8     -15    PT/INR - ( 15 Nov 2017 03:52 )   PT: 13.6 sec;   INR: 1.23 ratio       Urinalysis Basic - ( 2017 12:46 )    Color: Yellow / Appearance: Clear / S.020 / pH: x  Gluc: x / Ketone: Negative  / Bili: Negative / Urobili: 4 mg/dL   Blood: x / Protein: 30 mg/dL / Nitrite: Negative   Leuk Esterase: Trace / RBC: x / WBC 3-5   Sq Epi: x / Non Sq Epi: x / Bacteria: Occasional    I&O's Summary    2017 07:01  -  15 Nov 2017 07:00  --------------------------------------------------------  IN: 4100 mL / OUT: 500 mL / NET: 3600 mL    RADIOLOGY & ADDITIONAL STUDIES:    < from: US Abdomen Complete (17 @ 14:49) >    No evidence of ascites.    < from: MRI Brain w/Diffusion (17 @ 16:51) >    IMPRESSION: No acute infarction.    < from: Xray Chest 1 View AP/PA. (17 @ 11:12) >  No acute pulmonary disease. Cardiomegaly.  No significant change from prior exam.    < from: Xray Abdomen 1 View- AP Only (17 @ 09:11) >  Nonspecific gas pattern.    ADVANCE DIRECTIVES: Full Code HPI: 62M with PMH as listed admitted 10/28 with difficulty breathing. RRT called on 10/31 for agitation/delirium, alcohol withdrawal, transferred to MICU. Urgently intubated on 10/31 for altered mental status/obtundation/hypercapneic respiratory failure. Patient had been trached in the past.  Extubated, but required reintubation . Patient was being treated for aspiration pnuemonia, and subsequently received trach on 11/10. Now tolerating NG tube feeds, gi holding off on PEG for now to see if his mental state improves.     PERTINENT PMH REVIEWED: Yes     PAST MEDICAL & SURGICAL HISTORY:  Falls  Meningitis  Collapsed lung  Alcohol withdrawal  Emphysema of lung  ETOH abuse  Cirrhosis  Afib  CHF (congestive heart failure)  Poor historian  Alcohol abuse  Chronic atrial fibrillation  S/P BKA (below knee amputation), left: secondary to worsening infection in lower leg. Had both ankle injuries from fall s/p repair - left had complication.  S/P BKA (below knee amputation) unilateral, left    SOCIAL HISTORY:  smoker, EtOH                                    Admitted from:  home     Surrogate -    FAMILY HISTORY:  Family history unknown: Adopted  No pertinent family history in first degree relatives    Baseline ADLs (prior to admission):  Independent    Allergies    Ceclor (Rash)    Present Symptoms:     Dyspnea: vented trach   Nausea/Vomiting: No  Anxiety:  Yes - agitated at times   Depression: No  Fatigue: No  Loss of appetite: No    Pain: none             Character-            Duration-            Effect-            Factors-            Frequency-            Location-            Severity-    Review of Systems: Reviewed                     Negative:                     Positive:  Unable to obtain due to poor mentation   All others negative    MEDICATIONS  (STANDING):  ALBUTerol/ipratropium for Nebulization 3 milliLiter(s) Nebulizer every 6 hours  aspirin  chewable 81 milliGRAM(s) Oral daily  chlorhexidine 0.12% Liquid 15 milliLiter(s) Swish and Spit two times a day  diltiazem    Tablet 30 milliGRAM(s) Oral three times a day  enoxaparin Injectable 40 milliGRAM(s) SubCutaneous daily  folic acid 1 milliGRAM(s) Oral daily  influenza   Vaccine 0.5 milliLiter(s) IntraMuscular once  insulin regular  human corrective regimen sliding scale   SubCutaneous every 6 hours  lisinopril 5 milliGRAM(s) Oral daily  metoprolol    tartrate Injectable 5 milliGRAM(s) IV Push every 6 hours  multivitamin 1 Tablet(s) Oral daily  nicotine - 21 mG/24Hr(s) Patch 1 patch Transdermal daily  pantoprazole  Injectable 40 milliGRAM(s) IV Push daily  piperacillin/tazobactam IVPB. 3.375 Gram(s) IV Intermittent every 8 hours  sodium chloride 0.45%. 1000 milliLiter(s) (75 mL/Hr) IV Continuous <Continuous>  tamsulosin 0.4 milliGRAM(s) Oral at bedtime    MEDICATIONS  (PRN):  acetaminophen    Suspension 650 milliGRAM(s) Oral every 6 hours PRN For Temp greater than 38 C (100.4 F)  ALBUTerol    0.083% 2.5 milliGRAM(s) Nebulizer every 4 hours PRN Shortness of Breath and/or Wheezing  bisacodyl Suppository 10 milliGRAM(s) Rectal daily PRN Constipation  haloperidol    Injectable 2 milliGRAM(s) IV Push every 6 hours PRN agitation  lactulose Syrup 10 Gram(s) Oral every 12 hours PRN No bowel Movements for 24 hours  ondansetron Injectable 8 milliGRAM(s) IV Push every 6 hours PRN Vomiting      PHYSICAL EXAM:    Vital Signs Last 24 Hrs  T(C): 38.1 (15 Nov 2017 05:00), Max: 39.1 (2017 14:00)  T(F): 100.6 (15 Nov 2017 05:00), Max: 102.3 (2017 14:00)  HR: 105 (15 Nov 2017 11:00) (86 - 122)  BP: 117/67 (15 Nov 2017 11:00) (105/55 - 130/56)  BP(mean): 86 (15 Nov 2017 11:00) (75 - 94)  RR: 24 (15 Nov 2017 11:00) (18 - 33)  SpO2: 99% (15 Nov 2017 11:00) (83% - 100%)    General: lethargic     Karnofsky:  30 %    HEENT:  trach    Lungs: comfortable    CV: normal      GI:  NG    : incontinent      MSK: weakness      Skin: no rash    LABS:                      9.6    4.4   )-----------( 211      ( 15 Nov 2017 03:52 )             29.7     11-15    147<H>  |  109<H>  |  21.0<H>  ----------------------------<  114  3.6   |  28.0  |  0.56    Ca    8.7      15 Nov 2017 03:52  Phos  3.3     11-15  Mg     1.8     11-15    PT/INR - ( 15 Nov 2017 03:52 )   PT: 13.6 sec;   INR: 1.23 ratio       Urinalysis Basic - ( 2017 12:46 )    Color: Yellow / Appearance: Clear / S.020 / pH: x  Gluc: x / Ketone: Negative  / Bili: Negative / Urobili: 4 mg/dL   Blood: x / Protein: 30 mg/dL / Nitrite: Negative   Leuk Esterase: Trace / RBC: x / WBC 3-5   Sq Epi: x / Non Sq Epi: x / Bacteria: Occasional    I&O's Summary    2017 07:01  -  15 Nov 2017 07:00  --------------------------------------------------------  IN: 4100 mL / OUT: 500 mL / NET: 3600 mL    RADIOLOGY & ADDITIONAL STUDIES:    < from: US Abdomen Complete (17 @ 14:49) >    No evidence of ascites.    < from: MRI Brain w/Diffusion (17 @ 16:51) >    IMPRESSION: No acute infarction.    < from: Xray Chest 1 View AP/PA. (17 @ 11:12) >  No acute pulmonary disease. Cardiomegaly.  No significant change from prior exam.    < from: Xray Abdomen 1 View- AP Only (17 @ 09:11) >  Nonspecific gas pattern.    ADVANCE DIRECTIVES: Full Code

## 2017-11-15 NOTE — PROGRESS NOTE ADULT - SUBJECTIVE AND OBJECTIVE BOX
INTERVAL HPI/OVERNIGHT EVENTS:FU for consideration for PEG tube placement.Patient tolerating NG tube feeding. No particular complaints as per RN. Had diarrhea and lactulose held.     MEDICATIONS  (STANDING):  ALBUTerol/ipratropium for Nebulization 3 milliLiter(s) Nebulizer every 6 hours  aspirin  chewable 81 milliGRAM(s) Oral daily  chlorhexidine 0.12% Liquid 15 milliLiter(s) Swish and Spit two times a day  diltiazem    Tablet 30 milliGRAM(s) Oral three times a day  enoxaparin Injectable 40 milliGRAM(s) SubCutaneous daily  folic acid 1 milliGRAM(s) Oral daily  influenza   Vaccine 0.5 milliLiter(s) IntraMuscular once  insulin regular  human corrective regimen sliding scale   SubCutaneous every 6 hours  lisinopril 5 milliGRAM(s) Oral daily  metoprolol    tartrate Injectable 5 milliGRAM(s) IV Push every 6 hours  multivitamin 1 Tablet(s) Oral daily  nicotine - 21 mG/24Hr(s) Patch 1 patch Transdermal daily  pantoprazole  Injectable 40 milliGRAM(s) IV Push daily  piperacillin/tazobactam IVPB. 3.375 Gram(s) IV Intermittent every 8 hours  sodium chloride 0.45%. 1000 milliLiter(s) (75 mL/Hr) IV Continuous <Continuous>  tamsulosin 0.4 milliGRAM(s) Oral at bedtime    MEDICATIONS  (PRN):  acetaminophen    Suspension 650 milliGRAM(s) Oral every 6 hours PRN For Temp greater than 38 C (100.4 F)  ALBUTerol    0.083% 2.5 milliGRAM(s) Nebulizer every 4 hours PRN Shortness of Breath and/or Wheezing  bisacodyl Suppository 10 milliGRAM(s) Rectal daily PRN Constipation  haloperidol    Injectable 2 milliGRAM(s) IV Push every 6 hours PRN agitation  lactulose Syrup 10 Gram(s) Oral every 12 hours PRN No bowel Movements for 24 hours  ondansetron Injectable 8 milliGRAM(s) IV Push every 6 hours PRN Vomiting      Allergies    Ceclor (Rash)    Intolerances        Vital Signs Last 24 Hrs  T(C): 38.1 (15 Nov 2017 05:00), Max: 39.1 (2017 14:00)  T(F): 100.6 (15 Nov 2017 05:00), Max: 102.3 (2017 14:00)  HR: 101 (15 Nov 2017 09:04) (86 - 122)  BP: 112/67 (15 Nov 2017 08:00) (97/56 - 130/56)  BP(mean): 82 (15 Nov 2017 08:00) (73 - 94)  RR: 22 (15 Nov 2017 08:00) (18 - 33)  SpO2: 100% (15 Nov 2017 09:04) (83% - 100%)    LABS:                        9.6    4.4   )-----------( 211      ( 15 Nov 2017 03:52 )             29.7     11-15    147<H>  |  109<H>  |  21.0<H>  ----------------------------<  114  3.6   |  28.0  |  0.56    Ca    8.7      15 Nov 2017 03:52  Phos  3.3     11-15  Mg     1.8     11-15    TPro  6.7  /  Alb  2.9<L>  /  TBili  1.6  /  DBili  x   /  AST  17  /  ALT  10  /  AlkPhos  114  11-13    PT/INR - ( 15 Nov 2017 03:52 )   PT: 13.6 sec;   INR: 1.23 ratio           Urinalysis Basic - ( 2017 12:46 )    Color: Yellow / Appearance: Clear / S.020 / pH: x  Gluc: x / Ketone: Negative  / Bili: Negative / Urobili: 4 mg/dL   Blood: x / Protein: 30 mg/dL / Nitrite: Negative   Leuk Esterase: Trace / RBC: x / WBC 3-5   Sq Epi: x / Non Sq Epi: x / Bacteria: Occasional        RADIOLOGY & ADDITIONAL TESTS:  < from: US Abdomen Complete (17 @ 14:49) >  COMPARISON: Abdominal x-ray dated 2017    TECHNIQUE: Sonography of theabdomen.     FINDINGS:     Limited examination was performed in all 4 quadrants. No evidence of   ascites.    IMPRESSION:     No evidence of ascites.    < end of copied text >

## 2017-11-15 NOTE — PROGRESS NOTE ADULT - ASSESSMENT
Patient with alcohol liver disease, aspiration pneumonia, resp failure on CPAP trial. At this time, patient is tolerating the NG feeding    1. Will recommend to hold off PEG tube placement and at least wait for the mental status to improve  2. If does not improve,  will consider PEG placement next week  3. Will follow peripherally

## 2017-11-15 NOTE — CONSULT NOTE ADULT - PROBLEM SELECTOR RECOMMENDATION 3
continue diuresis with Lasix
-likely to need eventual PEG, but holding off for now to see if he can improve enough to swallow on his own.

## 2017-11-15 NOTE — CONSULT NOTE ADULT - PROBLEM SELECTOR RECOMMENDATION 4
converted to PO prednisone   continue nebs PRN
- will continue to follow for support, see if patient can wean, and if he can improve enough to avoid PEG placement.

## 2017-11-15 NOTE — PROGRESS NOTE ADULT - SUBJECTIVE AND OBJECTIVE BOX
Patient is a 62y old  Male who presents with a chief complaint of Shortness of breath (30 Oct 2017 15:03)    PAST MEDICAL & SURGICAL HISTORY:  Falls  Meningitis  Collapsed lung  Alcohol withdrawal  Emphysema of lung  ETOH abuse  Cirrhosis  Afib  CHF (congestive heart failure)  Poor historian  Alcohol abuse  Chronic atrial fibrillation  S/P BKA (below knee amputation), left: secondary to worsening infection in lower leg. Had both ankle injuries from fall s/p repair - left had complication.  S/P BKA (below knee amputation) unilateral, left    CLATUS CARUANA   62y    Male    BRIEF HOSPITAL COURSE:    Review of Systems:                       All other ROS are negative.    ICU Vital Signs Last 24 Hrs  T(C): 37 (15 Nov 2017 20:00), Max: 38.7 (15 Nov 2017 15:00)  T(F): 98.6 (15 Nov 2017 20:00), Max: 101.7 (15 Nov 2017 15:00)  HR: 90 (15 Nov 2017 21:37) (76 - 116)  BP: 102/62 (15 Nov 2017 20:00) (89/54 - 126/64)  BP(mean): 76 (15 Nov 2017 20:00) (67 - 91)  ABP: --  ABP(mean): --  RR: 21 (15 Nov 2017 21:00) (17 - 36)  SpO2: 99% (15 Nov 2017 21:37) (97% - 100%)    Physical Examination:    General:     HEENT:     PULM:     CVS:     ABD:     EXT:     SKIN:     Neuro:      Mode: AC/ CMV (Assist Control/ Continuous Mandatory Ventilation)  RR (machine): 12  TV (machine): 480  FiO2: 35  PEEP: 5  MAP: 9  PIP: 23    Mode: AC/ CMV (Assist Control/ Continuous Mandatory Ventilation), RR (machine): 12, TV (machine): 480, FiO2: 35, PEEP: 5, MAP: 9, PIP: 23  LABS:                        9.6    4.4   )-----------( 211      ( 15 Nov 2017 03:52 )             29.7     11-15    147<H>  |  109<H>  |  21.0<H>  ----------------------------<  114  3.6   |  28.0  |  0.56    Ca    8.7      15 Nov 2017 03:52  Phos  3.3     11-15  Mg     1.8     11-15            CAPILLARY BLOOD GLUCOSE  85 (15 Nov 2017 12:00)      POCT Blood Glucose.: 111 mg/dL (15 Nov 2017 18:28)      PT/INR - ( 15 Nov 2017 03:52 )   PT: 13.6 sec;   INR: 1.23 ratio             CULTURES:  Culture Results:   Numerous Pseudomonas aeruginosa  Moderate Yeast  No Routine respiratory raymundo present (11-13 @ 12:46)  Culture Results:   No growth at 48 hours (11-13 @ 09:16)  Culture Results:   No growth at 48 hours (11-13 @ 09:16)  Culture Results:   Growth in anaerobic bottle: Gram Positive Cocci in Clusters  Anaerobic Bottle: 27:25 Hours to positivity  Aerobic Bottle: No growth to date  .  ***Blood Panel PCR results on this specimen are available  approximately 3 hours after the Gram stain result.***  Gram stain, PCR, and/or culture results may not always  correspond due to difference in methodologies.  ************************************************************  This PCR assay was performed using [employer].  The following targets are tested for: Enterococcus,  vancomycin resistant enterococci, Listeria monocytogenes,  coagulase negative staphylococci, S. aureus,  methicillin resistant S. aureus, Streptococcus agalactiae  (Group B), S. pneumoniae, S. pyogenes (Group A),  Acinetobacter baumannii, Enterobacter cloacae, E. coli,  Klebsiella oxytoca, K. pneumoniae, Proteus sp.,  Serratia marcescens, Haemophilus influenzae,  Neisseria meningitidis, Pseudomonas aeruginosa, Candida  albicans, C. glabrata, C krusei, C parapsilosis,  C. tropicalis and the KPC resistance gene.  .  TYPE: (C=Critical, N=Notification, A=Abnormal) C  TESTS:  _ bld   DATE/TIME CALLED: _ 11/13/2017 15:17:52  CALLED TO: Ana Michelle RN  READ BACK (2 Patient Identifiers)(Y/N): _ Y  READ BACK VALUES (Y/N): _ Y  CALLED BY: _ shira (11-12 @ 10:58)      Medications:  piperacillin/tazobactam IVPB. 3.375 Gram(s) IV Intermittent every 8 hours  diltiazem    Tablet 30 milliGRAM(s) Oral three times a day  lisinopril 5 milliGRAM(s) Oral daily  metoprolol    tartrate Injectable 5 milliGRAM(s) IV Push every 6 hours  tamsulosin 0.4 milliGRAM(s) Oral at bedtime  ALBUTerol    0.083% 2.5 milliGRAM(s) Nebulizer every 4 hours PRN  ALBUTerol/ipratropium for Nebulization 3 milliLiter(s) Nebulizer every 6 hours  aetaminophen    Suspension 650 milliGRAM(s) Oral every 6 hours PRN  haloperidol    Injectable 2 milliGRAM(s) IV Push every 6 hours PRN  ondansetron Injectable 8 milliGRAM(s) IV Push every 6 hours PRN  aspirin  chewable 81 milliGRAM(s) Oral daily  enoxaparin Injectable 40 milliGRAM(s) SubCutaneous daily  bisacodyl Suppository 10 milliGRAM(s) Rectal daily PRN  lactulose Syrup 10 Gram(s) Oral every 12 hours PRN  pantoprazole  Injectable 40 milliGRAM(s) IV Push daily  insulin regular  human corrective regimen sliding scale   SubCutaneous every 6 hours  folic acid 1 milliGRAM(s) Oral daily  multivitamin 1 Tablet(s) Oral daily  sodium chloride 0.45%. 1000 milliLiter(s) IV Continuous <Continuous>  influenza   Vaccine 0.5 milliLiter(s) IntraMuscular once  chlorhexidine 0.12% Liquid 15 milliLiter(s) Swish and Spit two times a day  nicotine - 21 mG/24Hr(s) Patch 1 patch Transdermal daily          11-14 @ 07:01  -  11-15 @ 07:00  --------------------------------------------------------  IN: 4100 mL / OUT: 500 mL / NET: 3600 mL    11-15 @ 07:01  -  11-15 @ 23:42  --------------------------------------------------------  IN: 2475 mL / OUT: 0 mL / NET: 2475 mL        RADIOLOGY/IMAGING/ECHO        Assessment/Plan: Patient is a 62y old  Male who presents with a chief complaint of Shortness of breath (30 Oct 2017 15:03)    PAST MEDICAL & SURGICAL HISTORY:  Falls  Meningitis  Collapsed lung  Alcohol withdrawal  Emphysema of lung  ETOH abuse  Cirrhosis  Afib  CHF (congestive heart failure)  Poor historian  Alcohol abuse  Chronic atrial fibrillation  S/P BKA (below knee amputation), left: secondary to worsening infection in lower leg. Had both ankle injuries from fall s/p repair - left had complication.  S/P BKA (below knee amputation) unilateral, left    CLATUS CARUANA   62y    Male    BRIEF HOSPITAL COURSE:    61 y/o M with a h/o COPD, a-fib (no a/c), CHF, alcoholic cirrhosis, EtOH abuse, EtOH withdrawal (intubated in past for airway protection), initially admitted on 10/28 for COPD exacerbation and alcohol intoxication, signed out AMA and was found later on in hospital disoriented. Readmitted and RRT called later on when patient became tremulous, agitated, delirious, and began hallucinating. Transferred to MICU for further management with benzos and precedex gtt. Patient became obtunded, lost ability to protect airway, and was subsequently intubated. As per report, patient is highly sensitive to benzodiazepines.  CT head neg for acute events. Course complicated by a-fib with RVR, sepsis, pseudomonas aspiration pneumonia. Self extubated on 11/2 without need for reintubation initially but on the morning of 11/4 patient obtunded  with difficulty managing secretions. Reintubated for airway protection. Pt has had recurrent ruptured  balloons and ETT breakage.  He had failed his weaning trials and had a tracheostomy placed on Thurs. 11/9.  Brought back to the ICU secondary to for fever, likely micro-aspiration, svt needed monitor only place for monitor and vent bed in the hospital is the ICU    Tolerating some SBT. Now on AC.  MS slowly improving  For Peg next week         Review of Systems:    UATO vent/track/ AMS     ICU Vital Signs Last 24 Hrs  T(C): 37 (15 Nov 2017 20:00), Max: 38.7 (15 Nov 2017 15:00)  T(F): 98.6 (15 Nov 2017 20:00), Max: 101.7 (15 Nov 2017 15:00)  HR: 90 (15 Nov 2017 21:37) (76 - 116)  BP: 102/62 (15 Nov 2017 20:00) (89/54 - 126/64)  BP(mean): 76 (15 Nov 2017 20:00) (67 - 91)  ABP: --  ABP(mean): --  RR: 21 (15 Nov 2017 21:00) (17 - 36)  SpO2: 99% (15 Nov 2017 21:37) (97% - 100%)    Physical Examination:    General:  NAD    HEENT:  Trach site clean    PULM: bilateral BS no wheeze or rhonchi     CVS:  s1 s2 irreg    ABD: soft nt    EXT:  LBKA  UE edema     SKIN:  warm        Mode: AC/ CMV (Assist Control/ Continuous Mandatory Ventilation)  RR (machine): 12  TV (machine): 480  FiO2: 35  PEEP: 5  MAP: 9  PIP: 23    Mode: AC/ CMV (Assist Control/ Continuous Mandatory Ventilation), RR (machine): 12, TV (machine): 480, FiO2: 35, PEEP: 5, MAP: 9, PIP: 23  LABS:                        9.6    4.4   )-----------( 211      ( 15 Nov 2017 03:52 )             29.7     11-15    147<H>  |  109<H>  |  21.0<H>  ----------------------------<  114  3.6   |  28.0  |  0.56    Ca    8.7      15 Nov 2017 03:52  Phos  3.3     11-15  Mg     1.8     11-15            CAPILLARY BLOOD GLUCOSE  85 (15 Nov 2017 12:00)      POCT Blood Glucose.: 111 mg/dL (15 Nov 2017 18:28)      PT/INR - ( 15 Nov 2017 03:52 )   PT: 13.6 sec;   INR: 1.23 ratio             CULTURES:  Culture Results:   Numerous Pseudomonas aeruginosa  Moderate Yeast  No Routine respiratory raymundo present (11-13 @ 12:46)  Culture Results:   No growth at 48 hours (11-13 @ 09:16)  Culture Results:   No growth at 48 hours (11-13 @ 09:16)  Culture Results:   Growth in anaerobic bottle: Gram Positive Cocci in Clusters  Anaerobic Bottle: 27:25 Hours to positivity  Aerobic Bottle: No growth to date  .  ***Blood Panel PCR results on this specimen are available  approximately 3 hours after the Gram stain result.***  Gram stain, PCR, and/or culture results may not always  correspond due to difference in methodologies.  ************************************************************  This PCR assay was performed using [employer].  The following targets are tested for: Enterococcus,  vancomycin resistant enterococci, Listeria monocytogenes,  coagulase negative staphylococci, S. aureus,  methicillin resistant S. aureus, Streptococcus agalactiae  (Group B), S. pneumoniae, S. pyogenes (Group A),  Acinetobacter baumannii, Enterobacter cloacae, E. coli,  Klebsiella oxytoca, K. pneumoniae, Proteus sp.,  Serratia marcescens, Haemophilus influenzae,  Neisseria meningitidis, Pseudomonas aeruginosa, Candida  albicans, C. glabrata, C krusei, C parapsilosis,  C. tropicalis and the KPC resistance gene.  .  TYPE: (C=Critical, N=Notification, A=Abnormal) C  TESTS:  _ bld   DATE/TIME CALLED: _ 11/13/2017 15:17:52  CALLED TO: Ana Michelle RN  READ BACK (2 Patient Identifiers)(Y/N): _ Y  READ BACK VALUES (Y/N): _ Y  CALLED BY: Ana silva (11-12 @ 10:58)      Medications:  piperacillin/tazobactam IVPB. 3.375 Gram(s) IV Intermittent every 8 hours  diltiazem    Tablet 30 milliGRAM(s) Oral three times a day  lisinopril 5 milliGRAM(s) Oral daily  metoprolol    tartrate Injectable 5 milliGRAM(s) IV Push every 6 hours  tamsulosin 0.4 milliGRAM(s) Oral at bedtime  ALBUTerol    0.083% 2.5 milliGRAM(s) Nebulizer every 4 hours PRN  ALBUTerol/ipratropium for Nebulization 3 milliLiter(s) Nebulizer every 6 hours  aetaminophen    Suspension 650 milliGRAM(s) Oral every 6 hours PRN  haloperidol    Injectable 2 milliGRAM(s) IV Push every 6 hours PRN  ondansetron Injectable 8 milliGRAM(s) IV Push every 6 hours PRN  aspirin  chewable 81 milliGRAM(s) Oral daily  enoxaparin Injectable 40 milliGRAM(s) SubCutaneous daily  bisacodyl Suppository 10 milliGRAM(s) Rectal daily PRN  lactulose Syrup 10 Gram(s) Oral every 12 hours PRN  pantoprazole  Injectable 40 milliGRAM(s) IV Push daily  insulin regular  human corrective regimen sliding scale   SubCutaneous every 6 hours  folic acid 1 milliGRAM(s) Oral daily  multivitamin 1 Tablet(s) Oral daily  sodium chloride 0.45%. 1000 milliLiter(s) IV Continuous <Continuous>  influenza   Vaccine 0.5 milliLiter(s) IntraMuscular once  chlorhexidine 0.12% Liquid 15 milliLiter(s) Swish and Spit two times a day  nicotine - 21 mG/24Hr(s) Patch 1 patch Transdermal daily          11-14 @ 07:01  -  11-15 @ 07:00  --------------------------------------------------------  IN: 4100 mL / OUT: 500 mL / NET: 3600 mL    11-15 @ 07:01  -  11-15 @ 23:42  --------------------------------------------------------  IN: 2475 mL / OUT: 0 mL / NET: 2475 mL        RADIOLOGY/IMAGING/ECHO      < from: US Abdomen Complete (11.14.17 @ 14:49) >  IMPRESSION:     No evidence of ascites.      < end of copied text >    Assessment/Plan:      VDRF s/p Trach  SBT as tolerated.    Afib with RVR  now rate controlled on diltiazem/metoprolol   AMS  rx empirically with lactulose   normal level last time checked.    Dysphagia   No ascites on US.  Peg next week .

## 2017-11-16 DIAGNOSIS — R50.9 FEVER, UNSPECIFIED: ICD-10-CM

## 2017-11-16 LAB
APPEARANCE UR: CLEAR — SIGNIFICANT CHANGE UP
BILIRUB UR-MCNC: NEGATIVE — SIGNIFICANT CHANGE UP
COLOR SPEC: YELLOW — SIGNIFICANT CHANGE UP
COMMENT - URINE: SIGNIFICANT CHANGE UP
DIFF PNL FLD: ABNORMAL
GLUCOSE BLDC GLUCOMTR-MCNC: 106 MG/DL — HIGH (ref 70–99)
GLUCOSE BLDC GLUCOMTR-MCNC: 109 MG/DL — HIGH (ref 70–99)
GLUCOSE BLDC GLUCOMTR-MCNC: 92 MG/DL — SIGNIFICANT CHANGE UP (ref 70–99)
GLUCOSE BLDC GLUCOMTR-MCNC: 94 MG/DL — SIGNIFICANT CHANGE UP (ref 70–99)
GLUCOSE UR QL: NEGATIVE MG/DL — SIGNIFICANT CHANGE UP
KETONES UR-MCNC: NEGATIVE — SIGNIFICANT CHANGE UP
LEUKOCYTE ESTERASE UR-ACNC: NEGATIVE — SIGNIFICANT CHANGE UP
NITRITE UR-MCNC: NEGATIVE — SIGNIFICANT CHANGE UP
PH UR: 8 — SIGNIFICANT CHANGE UP (ref 5–8)
PROT UR-MCNC: 15 MG/DL
SP GR SPEC: 1.01 — SIGNIFICANT CHANGE UP (ref 1.01–1.02)
UROBILINOGEN FLD QL: 8 MG/DL
WBC UR QL: SIGNIFICANT CHANGE UP

## 2017-11-16 PROCEDURE — 71010: CPT | Mod: 26,77

## 2017-11-16 PROCEDURE — 99233 SBSQ HOSP IP/OBS HIGH 50: CPT

## 2017-11-16 PROCEDURE — 71010: CPT | Mod: 26

## 2017-11-16 RX ORDER — METOPROLOL TARTRATE 50 MG
25 TABLET ORAL
Qty: 0 | Refills: 0 | Status: DISCONTINUED | OUTPATIENT
Start: 2017-11-16 | End: 2017-11-17

## 2017-11-16 RX ORDER — HYDROMORPHONE HYDROCHLORIDE 2 MG/ML
0.5 INJECTION INTRAMUSCULAR; INTRAVENOUS; SUBCUTANEOUS ONCE
Qty: 0 | Refills: 0 | Status: DISCONTINUED | OUTPATIENT
Start: 2017-11-16 | End: 2017-11-16

## 2017-11-16 RX ORDER — ENOXAPARIN SODIUM 100 MG/ML
40 INJECTION SUBCUTANEOUS DAILY
Qty: 0 | Refills: 0 | Status: DISCONTINUED | OUTPATIENT
Start: 2017-11-16 | End: 2017-11-21

## 2017-11-16 RX ORDER — FLUCONAZOLE 150 MG/1
400 TABLET ORAL ONCE
Qty: 0 | Refills: 0 | Status: COMPLETED | OUTPATIENT
Start: 2017-11-16 | End: 2017-11-16

## 2017-11-16 RX ORDER — ENOXAPARIN SODIUM 100 MG/ML
40 INJECTION SUBCUTANEOUS ONCE
Qty: 0 | Refills: 0 | Status: DISCONTINUED | OUTPATIENT
Start: 2017-11-16 | End: 2017-11-16

## 2017-11-16 RX ADMIN — TAMSULOSIN HYDROCHLORIDE 0.4 MILLIGRAM(S): 0.4 CAPSULE ORAL at 22:19

## 2017-11-16 RX ADMIN — HYDROMORPHONE HYDROCHLORIDE 0.5 MILLIGRAM(S): 2 INJECTION INTRAMUSCULAR; INTRAVENOUS; SUBCUTANEOUS at 16:48

## 2017-11-16 RX ADMIN — PIPERACILLIN AND TAZOBACTAM 25 GRAM(S): 4; .5 INJECTION, POWDER, LYOPHILIZED, FOR SOLUTION INTRAVENOUS at 17:50

## 2017-11-16 RX ADMIN — Medication 5 MILLIGRAM(S): at 05:04

## 2017-11-16 RX ADMIN — PIPERACILLIN AND TAZOBACTAM 25 GRAM(S): 4; .5 INJECTION, POWDER, LYOPHILIZED, FOR SOLUTION INTRAVENOUS at 12:00

## 2017-11-16 RX ADMIN — LISINOPRIL 5 MILLIGRAM(S): 2.5 TABLET ORAL at 05:04

## 2017-11-16 RX ADMIN — Medication 3 MILLILITER(S): at 08:26

## 2017-11-16 RX ADMIN — CHLORHEXIDINE GLUCONATE 15 MILLILITER(S): 213 SOLUTION TOPICAL at 05:04

## 2017-11-16 RX ADMIN — FLUCONAZOLE 100 MILLIGRAM(S): 150 TABLET ORAL at 11:00

## 2017-11-16 RX ADMIN — Medication 81 MILLIGRAM(S): at 14:27

## 2017-11-16 RX ADMIN — HYDROMORPHONE HYDROCHLORIDE 0.5 MILLIGRAM(S): 2 INJECTION INTRAMUSCULAR; INTRAVENOUS; SUBCUTANEOUS at 16:33

## 2017-11-16 RX ADMIN — Medication 3 MILLILITER(S): at 15:18

## 2017-11-16 RX ADMIN — Medication 3 MILLILITER(S): at 03:39

## 2017-11-16 RX ADMIN — Medication 25 MILLIGRAM(S): at 14:27

## 2017-11-16 RX ADMIN — Medication 1 MILLIGRAM(S): at 14:27

## 2017-11-16 RX ADMIN — ENOXAPARIN SODIUM 40 MILLIGRAM(S): 100 INJECTION SUBCUTANEOUS at 12:00

## 2017-11-16 RX ADMIN — Medication 25 MILLIGRAM(S): at 17:51

## 2017-11-16 RX ADMIN — Medication 3 MILLILITER(S): at 20:21

## 2017-11-16 RX ADMIN — Medication 1 TABLET(S): at 14:28

## 2017-11-16 RX ADMIN — CHLORHEXIDINE GLUCONATE 15 MILLILITER(S): 213 SOLUTION TOPICAL at 17:51

## 2017-11-16 RX ADMIN — Medication 650 MILLIGRAM(S): at 15:58

## 2017-11-16 RX ADMIN — Medication 1 PATCH: at 12:00

## 2017-11-16 RX ADMIN — PIPERACILLIN AND TAZOBACTAM 25 GRAM(S): 4; .5 INJECTION, POWDER, LYOPHILIZED, FOR SOLUTION INTRAVENOUS at 02:03

## 2017-11-16 RX ADMIN — PANTOPRAZOLE SODIUM 40 MILLIGRAM(S): 20 TABLET, DELAYED RELEASE ORAL at 12:00

## 2017-11-16 NOTE — PROGRESS NOTE ADULT - ATTENDING COMMENTS
Pt seen and examined on rounds with [facility], agree with above assessment and plan.  Pt with fever- on course of Zosyn for psuedomonas in sputum may give one dose diflucan for moderate yeast although should not be pathogen but is increasing from prior sputum. Also fever may be related to NGT induced sinusitis. F/u CXR and U/A today. Spoke at length with pt's daughter Litzy via phone this morning regarding overall plan of care and current issues. She expressed concern for his mobility and is requesting more physical therapy, will address today on multidisciplinary rounds. Pt will likely benefit from PEG sooner than later as he is unlikely to come off the vent enough to be able to have cuff down and swallow in the setting of copious secretions. Will f/u w GI team.   Total attending CC Time -60min

## 2017-11-16 NOTE — PROGRESS NOTE ADULT - PROBLEM SELECTOR PLAN 5
multifactorial limit sedatives behavioral therapy sensorium improving multifactorial limit sedatives behavioral therapy sensorium improving today

## 2017-11-16 NOTE — PROGRESS NOTE ADULT - PROBLEM SELECTOR PLAN 2
will attempt a second CPAP trial later today, failed am trial  getting nebs/chest PT and pulm toilet  needs to get OOB to chair   will need peg eventually

## 2017-11-16 NOTE — PROGRESS NOTE ADULT - ASSESSMENT
62 yom copd, afib, cirrhosis, ETOH abuse, ETOH withdrawal with acute respiratory failure, s/p trach 62 yom copd, afib, cirrhosis, ETOH abuse, ETOH withdrawal with acute respiratory failure, s/p trach now with recurrent fever

## 2017-11-16 NOTE — PROGRESS NOTE ADULT - SUBJECTIVE AND OBJECTIVE BOX
Patient is a 62y old  Male who presents with a chief complaint of Shortness of breath (30 Oct 2017 15:03)      BRIEF HOSPITAL COURSE:  63 y/o M with a h/o COPD, a-fib (no a/c), CHF, alcoholic cirrhosis, EtOH abuse, EtOH withdrawal (intubated in past for airway protection), initially admitted on 10/28 for COPD exacerbation and alcohol intoxication, signed out AMA and was found later on in hospital disoriented. Readmitted and RRT called later on when patient became tremulous, agitated, delirious, and began hallucinating. Transferred to MICU for further management with benzos and precedex gtt. Patient became obtunded, lost ability to protect airway, and was subsequently intubated. As per report, patient is highly sensitive to benzodiazepines.  CT head neg for acute events. Course complicated by a-fib with RVR, sepsis, pseudomonas aspiration pneumonia. Self extubated on 11/2 without need for reintubation initially but on the morning of 11/4 patient obtunded  with difficulty managing secretions. Reintubated for airway protection. Pt has had recurrent ruptured  balloons and ETT breakage.  He had failed his weaning trials and had a tracheostomy placed on Thurs. 11/9.  Brought back to the ICU secondary to for fever, likely micro-aspiration, svt needed monitor only place for monitor and vent bed in the hospital is the ICU      Events last 24 hours: Pt had Temp of 101.7 yesterday, now not tolerating CPAP trials this am.  Will Check CXR, UA, RN to notify if pt has T>100.5.  Continue nebs, chest PT, suctioning.  Will attempt another weaning trial this afternoon.  Pt awake.  Following commands well this am.  Asking for paper to write.    PAST MEDICAL & SURGICAL HISTORY:  Falls  Meningitis  Collapsed lung  Alcohol withdrawal  Emphysema of lung  ETOH abuse  Cirrhosis  Afib  CHF (congestive heart failure)  Poor historian  Alcohol abuse  Chronic atrial fibrillation  S/P BKA (below knee amputation), left: secondary to worsening infection in lower leg. Had both ankle injuries from fall s/p repair - left had complication.  S/P BKA (below knee amputation) unilateral, left      Review of Systems:  Answers yes/no questions  Denies HA  Denies feeling SOB  Denies palpitations  Denies pain        Physical Examination:    General: No acute distress.  Alert, follows commands well today    HEENT: Pupils equal, reactive to light.  Symmetric.    PULM: course with scattered rhonci b/l diffuse    CVS: afib, rate more well controlled today between , +S1+S2    ABD: Soft, nondistended, nontender, normoactive bowel sounds, no masses    EXT: No edema, nontender, left BKA, MARIE well    SKIN: Warm and well perfused, no rashes noted. Patient is a 62y old  Male who presents with a chief complaint of Shortness of breath (30 Oct 2017 15:03)      BRIEF HOSPITAL COURSE:  63 y/o M with a h/o COPD, a-fib (no a/c), CHF, alcoholic cirrhosis, EtOH abuse, EtOH withdrawal (intubated in past for airway protection), initially admitted on 10/28 for COPD exacerbation and alcohol intoxication, signed out AMA and was found later on in hospital disoriented. Readmitted and RRT called later on when patient became tremulous, agitated, delirious, and began hallucinating. Transferred to MICU for further management with benzos and precedex gtt. Patient became obtunded, lost ability to protect airway, and was subsequently intubated. As per report, patient is highly sensitive to benzodiazepines.  CT head neg for acute events. Course complicated by a-fib with RVR, sepsis, pseudomonas aspiration pneumonia. Self extubated on 11/2 without need for reintubation initially but on the morning of 11/4 patient obtunded  with difficulty managing secretions. Reintubated for airway protection. Pt has had recurrent ruptured  balloons and ETT breakage.  He had failed his weaning trials and had a tracheostomy placed on Thurs. 11/9.  Brought back to the ICU secondary to for fever, likely micro-aspiration, svt needed monitor only place for monitor and vent bed in the hospital is the ICU      Events last 24 hours: Pt had Temp of 101.7 yesterday, now not tolerating CPAP trials this am.  Will Check CXR, UA, RN to notify if pt has T>100.5.  Continue nebs, chest PT, suctioning.  Will attempt another weaning trial this afternoon.  Pt awake.  Following commands well this am.  Asking for paper to write.    PAST MEDICAL & SURGICAL HISTORY:  Falls  Meningitis  Collapsed lung  Alcohol withdrawal  Emphysema of lung  ETOH abuse  Cirrhosis  Afib  CHF (congestive heart failure)  Poor historian  Alcohol abuse  Chronic atrial fibrillation  S/P BKA (below knee amputation), left: secondary to worsening infection in lower leg. Had both ankle injuries from fall s/p repair - left had complication.  S/P BKA (below knee amputation) unilateral, left      Review of Systems:  Answers yes/no questions  Denies HA  Denies feeling SOB  Denies palpitations  Denies pain    MEDICATIONS  (STANDING):  ALBUTerol/ipratropium for Nebulization 3 milliLiter(s) Nebulizer every 6 hours  aspirin  chewable 81 milliGRAM(s) Oral daily  chlorhexidine 0.12% Liquid 15 milliLiter(s) Swish and Spit two times a day  diltiazem    Tablet 30 milliGRAM(s) Oral three times a day  folic acid 1 milliGRAM(s) Oral daily  influenza   Vaccine 0.5 milliLiter(s) IntraMuscular once  insulin regular  human corrective regimen sliding scale   SubCutaneous every 6 hours  lisinopril 5 milliGRAM(s) Oral daily  metoprolol    tartrate Injectable 5 milliGRAM(s) IV Push every 6 hours  multivitamin 1 Tablet(s) Oral daily  nicotine - 21 mG/24Hr(s) Patch 1 patch Transdermal daily  pantoprazole  Injectable 40 milliGRAM(s) IV Push daily  piperacillin/tazobactam IVPB. 3.375 Gram(s) IV Intermittent every 8 hours  tamsulosin 0.4 milliGRAM(s) Oral at bedtime        CAPILLARY BLOOD GLUCOSE  85 (15 Nov 2017 12:00)  Mode: AC/ CMV (Assist Control/ Continuous Mandatory Ventilation)  RR (machine): 12  TV (machine): 480  FiO2: 35  PEEP: 5  MAP: 11  PIP: 16    ICU Vital Signs Last 24 Hrs  T(C): 37.6 (16 Nov 2017 08:00), Max: 38.7 (15 Nov 2017 15:00)  T(F): 99.7 (16 Nov 2017 08:00), Max: 101.7 (15 Nov 2017 15:00)  HR: 110 (16 Nov 2017 08:37) (72 - 114)  BP: 112/64 (16 Nov 2017 07:00) (89/54 - 126/64)  BP(mean): 80 (16 Nov 2017 07:00) (65 - 93)  ABP: --  ABP(mean): --  RR: 32 (16 Nov 2017 07:00) (17 - 41)  SpO2: 96% (16 Nov 2017 08:37) (96% - 100%)      Physical Examination:    General: No acute distress.  Alert, follows commands well today    HEENT: Pupils equal, reactive to light.  Symmetric.    PULM: course with scattered rhonci b/l diffuse    CVS: afib, rate more well controlled today between , +S1+S2    ABD: Soft, nondistended, nontender, normoactive bowel sounds, no masses    EXT: No edema, nontender, left BKA, MARIE well    SKIN: Warm and well perfused, no rashes noted.

## 2017-11-17 LAB
ANION GAP SERPL CALC-SCNC: 9 MMOL/L — SIGNIFICANT CHANGE UP (ref 5–17)
BUN SERPL-MCNC: 18 MG/DL — SIGNIFICANT CHANGE UP (ref 8–20)
CALCIUM SERPL-MCNC: 8.5 MG/DL — LOW (ref 8.6–10.2)
CHLORIDE SERPL-SCNC: 105 MMOL/L — SIGNIFICANT CHANGE UP (ref 98–107)
CO2 SERPL-SCNC: 28 MMOL/L — SIGNIFICANT CHANGE UP (ref 22–29)
CREAT SERPL-MCNC: 0.6 MG/DL — SIGNIFICANT CHANGE UP (ref 0.5–1.3)
GLUCOSE BLDC GLUCOMTR-MCNC: 103 MG/DL — HIGH (ref 70–99)
GLUCOSE BLDC GLUCOMTR-MCNC: 96 MG/DL — SIGNIFICANT CHANGE UP (ref 70–99)
GLUCOSE SERPL-MCNC: 111 MG/DL — SIGNIFICANT CHANGE UP (ref 70–115)
HCT VFR BLD CALC: 32.1 % — LOW (ref 42–52)
HGB BLD-MCNC: 10.1 G/DL — LOW (ref 14–18)
MAGNESIUM SERPL-MCNC: 1.8 MG/DL — SIGNIFICANT CHANGE UP (ref 1.6–2.6)
MCHC RBC-ENTMCNC: 30 PG — SIGNIFICANT CHANGE UP (ref 27–31)
MCHC RBC-ENTMCNC: 31.5 G/DL — LOW (ref 32–36)
MCV RBC AUTO: 95.3 FL — HIGH (ref 80–94)
PHOSPHATE SERPL-MCNC: 2.8 MG/DL — SIGNIFICANT CHANGE UP (ref 2.4–4.7)
PLATELET # BLD AUTO: 195 K/UL — SIGNIFICANT CHANGE UP (ref 150–400)
POTASSIUM SERPL-MCNC: 4.1 MMOL/L — SIGNIFICANT CHANGE UP (ref 3.5–5.3)
POTASSIUM SERPL-SCNC: 4.1 MMOL/L — SIGNIFICANT CHANGE UP (ref 3.5–5.3)
RBC # BLD: 3.37 M/UL — LOW (ref 4.6–6.2)
RBC # FLD: 15.9 % — HIGH (ref 11–15.6)
SODIUM SERPL-SCNC: 142 MMOL/L — SIGNIFICANT CHANGE UP (ref 135–145)
WBC # BLD: 4.9 K/UL — SIGNIFICANT CHANGE UP (ref 4.8–10.8)
WBC # FLD AUTO: 4.9 K/UL — SIGNIFICANT CHANGE UP (ref 4.8–10.8)

## 2017-11-17 PROCEDURE — 99233 SBSQ HOSP IP/OBS HIGH 50: CPT

## 2017-11-17 PROCEDURE — 71010: CPT | Mod: 26

## 2017-11-17 RX ORDER — METOPROLOL TARTRATE 50 MG
50 TABLET ORAL EVERY 8 HOURS
Qty: 0 | Refills: 0 | Status: DISCONTINUED | OUTPATIENT
Start: 2017-11-17 | End: 2017-11-24

## 2017-11-17 RX ORDER — METOPROLOL TARTRATE 50 MG
5 TABLET ORAL ONCE
Qty: 0 | Refills: 0 | Status: COMPLETED | OUTPATIENT
Start: 2017-11-17 | End: 2017-11-17

## 2017-11-17 RX ORDER — OLANZAPINE 15 MG/1
10 TABLET, FILM COATED ORAL DAILY
Qty: 0 | Refills: 0 | Status: DISCONTINUED | OUTPATIENT
Start: 2017-11-17 | End: 2017-11-18

## 2017-11-17 RX ORDER — IBUPROFEN 200 MG
600 TABLET ORAL ONCE
Qty: 0 | Refills: 0 | Status: COMPLETED | OUTPATIENT
Start: 2017-11-17 | End: 2017-11-17

## 2017-11-17 RX ADMIN — Medication 1 TABLET(S): at 11:59

## 2017-11-17 RX ADMIN — Medication 650 MILLIGRAM(S): at 01:03

## 2017-11-17 RX ADMIN — Medication 5 MILLIGRAM(S): at 07:21

## 2017-11-17 RX ADMIN — Medication 50 MILLIGRAM(S): at 22:56

## 2017-11-17 RX ADMIN — Medication 1 PATCH: at 11:59

## 2017-11-17 RX ADMIN — Medication 50 MILLIGRAM(S): at 12:03

## 2017-11-17 RX ADMIN — CHLORHEXIDINE GLUCONATE 15 MILLILITER(S): 213 SOLUTION TOPICAL at 05:36

## 2017-11-17 RX ADMIN — Medication 1 MILLIGRAM(S): at 11:59

## 2017-11-17 RX ADMIN — Medication 5 MILLIGRAM(S): at 12:11

## 2017-11-17 RX ADMIN — PIPERACILLIN AND TAZOBACTAM 25 GRAM(S): 4; .5 INJECTION, POWDER, LYOPHILIZED, FOR SOLUTION INTRAVENOUS at 01:40

## 2017-11-17 RX ADMIN — PANTOPRAZOLE SODIUM 40 MILLIGRAM(S): 20 TABLET, DELAYED RELEASE ORAL at 11:24

## 2017-11-17 RX ADMIN — OLANZAPINE 10 MILLIGRAM(S): 15 TABLET, FILM COATED ORAL at 11:24

## 2017-11-17 RX ADMIN — Medication 650 MILLIGRAM(S): at 18:08

## 2017-11-17 RX ADMIN — PIPERACILLIN AND TAZOBACTAM 25 GRAM(S): 4; .5 INJECTION, POWDER, LYOPHILIZED, FOR SOLUTION INTRAVENOUS at 09:36

## 2017-11-17 RX ADMIN — Medication 650 MILLIGRAM(S): at 11:23

## 2017-11-17 RX ADMIN — LISINOPRIL 5 MILLIGRAM(S): 2.5 TABLET ORAL at 05:37

## 2017-11-17 RX ADMIN — Medication 81 MILLIGRAM(S): at 11:24

## 2017-11-17 RX ADMIN — Medication 3 MILLILITER(S): at 07:56

## 2017-11-17 RX ADMIN — Medication 1 PATCH: at 12:00

## 2017-11-17 RX ADMIN — Medication 3 MILLILITER(S): at 03:09

## 2017-11-17 RX ADMIN — CHLORHEXIDINE GLUCONATE 15 MILLILITER(S): 213 SOLUTION TOPICAL at 17:30

## 2017-11-17 RX ADMIN — PIPERACILLIN AND TAZOBACTAM 25 GRAM(S): 4; .5 INJECTION, POWDER, LYOPHILIZED, FOR SOLUTION INTRAVENOUS at 17:30

## 2017-11-17 RX ADMIN — Medication 3 MILLILITER(S): at 20:28

## 2017-11-17 RX ADMIN — TAMSULOSIN HYDROCHLORIDE 0.4 MILLIGRAM(S): 0.4 CAPSULE ORAL at 22:56

## 2017-11-17 RX ADMIN — ENOXAPARIN SODIUM 40 MILLIGRAM(S): 100 INJECTION SUBCUTANEOUS at 11:24

## 2017-11-17 RX ADMIN — Medication 25 MILLIGRAM(S): at 05:37

## 2017-11-17 RX ADMIN — Medication 3 MILLILITER(S): at 14:59

## 2017-11-17 RX ADMIN — Medication 600 MILLIGRAM(S): at 22:59

## 2017-11-17 NOTE — PROGRESS NOTE ADULT - ASSESSMENT
62 yom copd, afib, cirrhosis, ETOH abuse, ETOH withdrawal with acute respiratory failure, s/p trach now with recurrent fever

## 2017-11-17 NOTE — PROGRESS NOTE ADULT - ATTENDING COMMENTS
Pt remains critically ill - has required additional doses of Lopressor today for periods of AF RVR, still spiking temps with negative to date cultures to finish abx 11/19. Pt's daughter Darling was at bedside today, overall medical condition and plan of care reviewed with her. Questions answered.

## 2017-11-17 NOTE — PROGRESS NOTE ADULT - PROBLEM SELECTOR PLAN 4
multifactorial  added Zyprexa today to attempt to keep agitation at bay  pt has been either quite agitated with hyperactive delirium to somnolent when given medications  will attempt to strike a balance

## 2017-11-17 NOTE — PROGRESS NOTE ADULT - PROBLEM SELECTOR PLAN 3
sputum from Nov 13 with yeast and psuedomonas=- Zosyn until 11/19  one dose diflucan given  U/A and CXR negative    blood cultures testing

## 2017-11-17 NOTE — PROGRESS NOTE ADULT - PROBLEM SELECTOR PLAN 1
rate control continues to be an ongoing issue requiring IV doses of lopressor throughout today   increased both Diltiazem and Metoprolol today

## 2017-11-17 NOTE — CHART NOTE - NSCHARTNOTEFT_GEN_A_CORE
Source: Patient [ ]  Family [ ]   other [ x] chart    Current Diet: NPO with EN     Patient reports [ ] nausea  [ ] vomiting [ ] diarrhea [ ] constipation  [ ]chewing problems [ ] swallowing issues  [ ] other:     PO intake:  < 50% [ ]   50-75%  [ ]   %  [ ]  other :    Source for PO intake [ ] Patient [ ] family [ ] chart [ ] staff [ ] other    Enteral /Parenteral Nutrition: Glucerna @ 60ml/hr via OGT (1800kcal, 99g protein)    Current Weight:   11/17 229#  10/30 229#    % Weight Change: Weight remains stable     Pertinent Medications: MEDICATIONS  (STANDING):  ALBUTerol/ipratropium for Nebulization 3 milliLiter(s) Nebulizer every 6 hours  aspirin  chewable 81 milliGRAM(s) Oral daily  chlorhexidine 0.12% Liquid 15 milliLiter(s) Swish and Spit two times a day  diltiazem    Tablet 60 milliGRAM(s) Oral every 6 hours  enoxaparin Injectable 40 milliGRAM(s) SubCutaneous daily  folic acid 1 milliGRAM(s) Oral daily  influenza   Vaccine 0.5 milliLiter(s) IntraMuscular once  lisinopril 5 milliGRAM(s) Oral daily  metoprolol     tartrate 50 milliGRAM(s) Oral every 8 hours  multivitamin 1 Tablet(s) Oral daily  nicotine - 21 mG/24Hr(s) Patch 1 patch Transdermal daily  OLANZapine 10 milliGRAM(s) Oral daily  pantoprazole  Injectable 40 milliGRAM(s) IV Push daily  piperacillin/tazobactam IVPB. 3.375 Gram(s) IV Intermittent every 8 hours  tamsulosin 0.4 milliGRAM(s) Oral at bedtime    MEDICATIONS  (PRN):  acetaminophen    Suspension 650 milliGRAM(s) Oral every 6 hours PRN For Temp greater than 38 C (100.4 F)  ALBUTerol    0.083% 2.5 milliGRAM(s) Nebulizer every 4 hours PRN Shortness of Breath and/or Wheezing  bisacodyl Suppository 10 milliGRAM(s) Rectal daily PRN Constipation  lactulose Syrup 10 Gram(s) Oral every 12 hours PRN No bowel Movements for 24 hours  ondansetron Injectable 8 milliGRAM(s) IV Push every 6 hours PRN Vomiting    Pertinent Labs: CBC Full  -  ( 17 Nov 2017 05:31 )  WBC Count : 4.9 K/uL  Hemoglobin : 10.1 g/dL  Hematocrit : 32.1 %  Platelet Count - Automated : 195 K/uL  Mean Cell Volume : 95.3 fl  Mean Cell Hemoglobin : 30.0 pg  Mean Cell Hemoglobin Concentration : 31.5 g/dL  Auto Neutrophil # : x  Auto Lymphocyte # : x  Auto Monocyte # : x  Auto Eosinophil # : x  Auto Basophil # : x  Auto Neutrophil % : x  Auto Lymphocyte % : x  Auto Monocyte % : x  Auto Eosinophil % : x  Auto Basophil % : x    11-17 Na142 mmol/L Glu 111 mg/dL K+ 4.1 mmol/L Cr  0.60 mg/dL BUN 18.0 mg/dL Phos 2.8 mg/dL Alb n/a   PAB n/a         Skin: No pressure ulcers noted     Nutrition focused physical exam conducted - found signs of malnutrition [ ]absent [ ]present    Subcutaneous fat loss: [ ] Orbital fat pads region, [ ]Buccal fat region, [ ]Triceps region,  [ ]Ribs region    Muscle wasting: [ ]Temples region, [ ]Clavicle region, [ ]Shoulder region, [ ]Scapula region, [ ]Interosseous region,  [ ]thigh region, [ ]Calf region    Estimated Needs:   [x ] no change since previous assessment  [ ] recalculated:     Pt is s/p trach, possible plan for PEG in near future. Pt with altered mental status. Tolerating tube feedings via OGT.     Recommendations:   1) Continue current nutrition plan of care.     Monitoring and Evaluation:   [ ] PO intake [ ] Tolerance to diet prescription [X] Weights  [X] Follow up per protocol [X] Labs:    a

## 2017-11-17 NOTE — PROGRESS NOTE ADULT - SUBJECTIVE AND OBJECTIVE BOX
Patient is a 62y old  Male who presents with a chief complaint of Shortness of breath (30 Oct 2017 15:03)    PAST MEDICAL & SURGICAL HISTORY:  Falls  Meningitis  Collapsed lung  Alcohol withdrawal  Emphysema of lung  ETOH abuse  Cirrhosis  Afib  CHF (congestive heart failure)  Poor historian  Alcohol abuse  Chronic atrial fibrillation  S/P BKA (below knee amputation), left: secondary to worsening infection in lower leg. Had both ankle injuries from fall s/p repair - left had complication.  S/P BKA (below knee amputation) unilateral, left    CLATUS CARUANA   62y    Male    BRIEF HOSPITAL COURSE:  3 y/o M with a h/o COPD, a-fib (no a/c), CHF, alcoholic cirrhosis, EtOH abuse, EtOH withdrawal (intubated in past for airway protection), initially admitted on 10/28 for COPD exacerbation and alcohol intoxication, signed out AMA and was found later on in hospital disoriented. Readmitted and RRT called later on when patient became tremulous, agitated, delirious, and began hallucinating. Transferred to MICU for further management with benzos and precedex gtt. Patient became obtunded, lost ability to protect airway, and was subsequently intubated. As per report, patient is highly sensitive to benzodiazepines.  CT head neg for acute events. Course complicated by a-fib with RVR, sepsis, pseudomonas aspiration pneumonia. Self extubated on  without need for reintubation initially but on the morning of  patient obtunded  with difficulty managing secretions. Reintubated for airway protection. Pt has had recurrent ruptured  balloons and ETT breakage.  He had failed his weaning trials and had a tracheostomy placed on Thurs. .  Brought back to the ICU secondary to for fever, likely micro-aspiration, svt needed monitor only place for monitor and vent bed in the hospital is the ICU    Tolerating some SBT. Now on AC vent.  MS slowly improving  For Peg next week     Fever today   cultured earlier         Review of Systems:     UATO vented                    ICU Vital Signs Last 24 Hrs  T(C): 38.4 (2017 23:00), Max: 38.6 (2017 15:00)  T(F): 101.1 (2017 23:00), Max: 101.5 (2017 15:00)  HR: 102 (2017 01:00) (82 - 142)  BP: 106/57 (2017 01:00) (86/53 - 124/74)  BP(mean): 78 (2017 01:00) (65 - 95)  ABP: --  ABP(mean): --  RR: 31 (2017 01:00) (15 - 41)  SpO2: 97% (2017 01:00) (93% - 100%)    Physical Examination:    General:   NAD    HEENT:  no JVD  tach site clean     PULM:  bilateral BS     CVS:  s1 s2 irreg    ABD:  soft NT     EXT:  L BKA    SKIN:     Neuro:      Mode: AC/ CMV (Assist Control/ Continuous Mandatory Ventilation)  RR (machine): 12  TV (machine): 480  FiO2: 30  PEEP: 5  MAP: 12  PIP: 30    Mode: AC/ CMV (Assist Control/ Continuous Mandatory Ventilation), RR (machine): 12, TV (machine): 480, FiO2: 30, PEEP: 5, MAP: 12, PIP: 30  LABS:                        9.6    4.4   )-----------( 211      ( 15 Nov 2017 03:52 )             29.7     11-15    147<H>  |  109<H>  |  21.0<H>  ----------------------------<  114  3.6   |  28.0  |  0.56    Ca    8.7      15 Nov 2017 03:52  Phos  3.3     11-15  Mg     1.8     11-15        CAPILLARY BLOOD GLUCOSE  94 (2017 17:00)    POCT Blood Glucose.: 96 mg/dL (2017 00:10)    PT/INR - ( 15 Nov 2017 03:52 )   PT: 13.6 sec;   INR: 1.23 ratio      Urinalysis Basic - ( 2017 12:35 )    Color: Yellow / Appearance: Clear / S.015 / pH: x  Gluc: x / Ketone: Negative  / Bili: Negative / Urobili: 8 mg/dL   Blood: x / Protein: 15 mg/dL / Nitrite: Negative   Leuk Esterase: Negative / RBC: x / WBC 0-2   Sq Epi: x / Non Sq Epi: x / Bacteria: x      CULTURES:  Culture Results:   Numerous Pseudomonas aeruginosa  Moderate Yeast  No Routine respiratory raymundo present ( @ 12:46)  Culture Results:   No growth at 48 hours ( @ 09:16)  Culture Results:   No growth at 48 hours ( @ 09:16)  Culture Results:   Growth in anaerobic bottle: Gram Positive Cocci in Clusters  Anaerobic Bottle: 27:25 Hours to positivity  Aerobic Bottle: No growth to date  .  ***Blood Panel PCR results on this specimen are available  approximately 3 hours after the Gram stain result.***  Gram stain, PCR, and/or culture results may not always  correspond due to difference in methodologies.  ************************************************************  This PCR assay was performed using [employer].  The following targets are tested for: Enterococcus,  vancomycin resistant enterococci, Listeria monocytogenes,  coagulase negative staphylococci, S. aureus,  methicillin resistant S. aureus, Streptococcus agalactiae  (Group B), S. pneumoniae, S. pyogenes (Group A),  Acinetobacter baumannii, Enterobacter cloacae, E. coli,  Klebsiella oxytoca, K. pneumoniae, Proteus sp.,  Serratia marcescens, Haemophilus influenzae,  Neisseria meningitidis, Pseudomonas aeruginosa, Candida  albicans, C. glabrata, C krusei, C parapsilosis,  C. tropicalis and the KPC resistance gene.  .  TYPE: (C=Critical, N=Notification, A=Abnormal) C  TESTS:  _ bld gs  DATE/TIME CALLED: _ 2017 15:17:52  CALLED TO: Ana Michelle RN  READ BACK (2 Patient Identifiers)(Y/N): _ Y  READ BACK VALUES (Y/N): _ Y  CALLED BY: _ mikein ( @ 10:58)      Medications:  piperacillin/tazobactam IVPB. 3.375 Gram(s) IV Intermittent every 8 hours  diltiazem    Tablet 60 milliGRAM(s) Oral every 6 hours  lisinopril 5 milliGRAM(s) Oral daily  metoprolol     tartrate 25 milliGRAM(s) Oral two times a day  tamsulosin 0.4 milliGRAM(s) Oral at bedtime  ALBUTerol    0.083% 2.5 milliGRAM(s) Nebulizer every 4 hours PRN  ALBUTerol/ipratropium for Nebulization 3 milliLiter(s) Nebulizer every 6 hours  acetaminophen    Suspension 650 milliGRAM(s) Oral every 6 hours PRN  ondansetron Injectable 8 milliGRAM(s) IV Push every 6 hours PRN  aspirin  chewable 81 milliGRAM(s) Oral daily  enoxaparin Injectable 40 milliGRAM(s) SubCutaneous daily  bisacodyl Suppository 10 milliGRAM(s) Rectal daily PRN  lactulose Syrup 10 Gram(s) Oral every 12 hours PRN  pantoprazole  Injectable 40 milliGRAM(s) IV Push daily  insulin regular  human corrective regimen sliding scale   SubCutaneous every 6 hours  folic acid 1 milliGRAM(s) Oral daily  multivitamin 1 Tablet(s) Oral daily  influenza   Vaccine 0.5 milliLiter(s) IntraMuscular once  chlorhexidine 0.12% Liquid 15 milliLiter(s) Swish and Spit two times a day  nicotine - 21 mG/24Hr(s) Patch 1 patch Transdermal daily      11-15 @ 07:  -   @ 07:00  --------------------------------------------------------  IN: 3820 mL / OUT: 550 mL / NET: 3270 mL     @ 07:01  -  17 @ 03:02  --------------------------------------------------------  IN: 2445 mL / OUT: 1250 mL / NET: 1195 mL        RADIOLOGY/IMAGING/ECHO    cxr without infiltrate     Assessment/Plan:      VDRF s/p Trach  SBT as tolerated.  Slow to wean  Afib with RVR  now rate controlled on diltiazem/metoprolol   Goes higher with fever   AMS  rx empirically with lactulose   normal level last time checked.   MS improving daily  Dysphagia   No ascites on US.  Peg next week .   Fever on zosyn cxr and U/A negative  CNS on 12th blood culture f/u negative.  Recultured.

## 2017-11-17 NOTE — PROGRESS NOTE ADULT - PROBLEM SELECTOR PLAN 5
fever may be related to NGT, not weaning from the vent as quickly as possible which necessitates PEG placement   GI consulted

## 2017-11-17 NOTE — PROGRESS NOTE ADULT - SUBJECTIVE AND OBJECTIVE BOX
Patient is a 62y old  Male who presents with a chief complaint of Shortness of breath (30 Oct 2017 15:03)      BRIEF HOSPITAL COURSE: 61 y/o M with a h/o COPD, a-fib (no a/c), CHF, alcoholic cirrhosis, EtOH abuse, EtOH withdrawal (intubated in past for airway protection), initially admitted on 10/28 for COPD exacerbation and alcohol intoxication, signed out AMA and was found later on in hospital disoriented. Readmitted and RRT called later on when patient became tremulous, agitated, delirious, and began hallucinating. Transferred to MICU for further management with benzos and precedex gtt. Patient became obtunded, lost ability to protect airway, and was subsequently intubated. As per report, patient is highly sensitive to benzodiazepines.  CT head neg for acute events. Course complicated by a-fib with RVR, sepsis, pseudomonas aspiration pneumonia. Self extubated on  without need for reintubation initially but on the morning of  patient obtunded  with difficulty managing secretions. Reintubated for airway protection. Pt has had recurrent ruptured  balloons and ETT breakage.  He had failed his weaning trials and had a tracheostomy placed on Thurs. .  Brought back to the ICU secondary to for fever and poorly controlled AF     Events last 24 hours: febrile again with periods of AF w RVR, agitated did not tolerate SBT this am    PAST MEDICAL & SURGICAL HISTORY:  Falls  Meningitis  Collapsed lung  Alcohol withdrawal  Emphysema of lung  ETOH abuse  Cirrhosis  Afib  CHF (congestive heart failure)  Poor historian  Alcohol abuse  Chronic atrial fibrillation  S/P BKA (below knee amputation), left: secondary to worsening infection in lower leg. Had both ankle injuries from fall s/p repair - left had complication.  S/P BKA (below knee amputation) unilateral, left      Review of Systems:  Cannot obtain pt is trached to vent with periods of inattention      Medications:  piperacillin/tazobactam IVPB. 3.375 Gram(s) IV Intermittent every 8 hours    diltiazem    Tablet 60 milliGRAM(s) Oral every 6 hours  lisinopril 5 milliGRAM(s) Oral daily  metoprolol     tartrate 50 milliGRAM(s) Oral every 8 hours  tamsulosin 0.4 milliGRAM(s) Oral at bedtime    ALBUTerol    0.083% 2.5 milliGRAM(s) Nebulizer every 4 hours PRN  ALBUTerol/ipratropium for Nebulization 3 milliLiter(s) Nebulizer every 6 hours    acetaminophen    Suspension 650 milliGRAM(s) Oral every 6 hours PRN  OLANZapine 10 milliGRAM(s) Oral daily  ondansetron Injectable 8 milliGRAM(s) IV Push every 6 hours PRN      aspirin  chewable 81 milliGRAM(s) Oral daily  enoxaparin Injectable 40 milliGRAM(s) SubCutaneous daily    bisacodyl Suppository 10 milliGRAM(s) Rectal daily PRN  lactulose Syrup 10 Gram(s) Oral every 12 hours PRN  pantoprazole  Injectable 40 milliGRAM(s) IV Push daily        folic acid 1 milliGRAM(s) Oral daily  multivitamin 1 Tablet(s) Oral daily    influenza   Vaccine 0.5 milliLiter(s) IntraMuscular once    chlorhexidine 0.12% Liquid 15 milliLiter(s) Swish and Spit two times a day    nicotine - 21 mG/24Hr(s) Patch 1 patch Transdermal daily      Mode: AC/ CMV (Assist Control/ Continuous Mandatory Ventilation)  RR (machine): 12  TV (machine): 480  FiO2: 30  PEEP: 5  MAP: 11  PIP: 25      ICU Vital Signs Last 24 Hrs  T(C): 39.4 (2017 11:40), Max: 39.4 (2017 11:40)  T(F): 102.9 (2017 11:40), Max: 102.9 (2017 11:40)  HR: 97 (2017 16:00) (79 - 152)  BP: 80/55 (2017 15:00) (80/55 - 123/78)  BP(mean): 63 (2017 15:00) (63 - 97)  ABP: --  ABP(mean): --  RR: 21 (2017 15:00) (15 - 35)  SpO2: 100% (2017 16:00) (93% - 100%)          I&O's Detail    2017 07:01  -  2017 07:00  --------------------------------------------------------  IN:    Enteral Tube Flush: 100 mL    Free Water: 1000 mL    Glucerna: 1380 mL    sodium chloride 0.45%: 150 mL    Solution: 275 mL    Solution: 200 mL  Total IN: 3105 mL    OUT:    Incontinent per Condom Catheter: 1100 mL    Voided: 700 mL  Total OUT: 1800 mL    Total NET: 1305 mL      2017 07:01  -  2017 16:44  --------------------------------------------------------  IN:    Enteral Tube Flush: 50 mL    Free Water: 250 mL    Glucerna: 360 mL    Solution: 100 mL  Total IN: 760 mL    OUT:    Incontinent per Condom Catheter: 450 mL  Total OUT: 450 mL    Total NET: 310 mL            LABS:                        10.1   4.9   )-----------( 195      ( 2017 05:31 )             32.1         142  |  105  |  18.0  ----------------------------<  111  4.1   |  28.0  |  0.60    Ca    8.5<L>      2017 05:31  Phos  2.8       Mg     1.8                 CAPILLARY BLOOD GLUCOSE  94 (2017 17:00)      POCT Blood Glucose.: 103 mg/dL (2017 05:23)      Urinalysis Basic - ( 2017 12:35 )    Color: Yellow / Appearance: Clear / S.015 / pH: x  Gluc: x / Ketone: Negative  / Bili: Negative / Urobili: 8 mg/dL   Blood: x / Protein: 15 mg/dL / Nitrite: Negative   Leuk Esterase: Negative / RBC: x / WBC 0-2   Sq Epi: x / Non Sq Epi: x / Bacteria: x      CULTURES:  Culture Results:   Numerous Pseudomonas aeruginosa  Moderate Yeast  No Routine respiratory raymundo present ( @ 12:46)  Culture Results:   No growth at 48 hours ( @ 09:16)  Culture Results:   No growth at 48 hours ( @ 09:16)  Culture Results:   Growth in anaerobic bottle: Gram Positive Cocci in Clusters  Anaerobic Bottle: 27:25 Hours to positivity  Aerobic Bottle: No growth to date  .  ***Blood Panel PCR results on this specimen are available  approximately 3 hours after the Gram stain result.***  Gram stain, PCR, and/or culture results may not always  correspond due to difference in methodologies.  ************************************************************  This PCR assay was performed using [employer].  The following targets are tested for: Enterococcus,  vancomycin resistant enterococci, Listeria monocytogenes,  coagulase negative staphylococci, S. aureus,  methicillin resistant S. aureus, Streptococcus agalactiae  (Group B), S. pneumoniae, S. pyogenes (Group A),  Acinetobacter baumannii, Enterobacter cloacae, E. coli,  Klebsiella oxytoca, K. pneumoniae, Proteus sp.,  Serratia marcescens, Haemophilus influenzae,  Neisseria meningitidis, Pseudomonas aeruginosa, Candida  albicans, C. glabrata, C krusei, C parapsilosis,  C. tropicalis and the KPC resistance gene.  .  TYPE: (C=Critical, N=Notification, A=Abnormal) C  TESTS:  _ bld   DATE/TIME CALLED: _ 2017 15:17:52  CALLED TO: Ana Michelle RN  READ BACK (2 Patient Identifiers)(Y/N): _ Y  READ BACK VALUES (Y/N): _ Y  CALLED BY: Ana mckeonin ( @ 10:58)      Physical Examination:    General: No acute distress.      HEENT: Pupils equal, reactive to light.  Symmetric. trach in place, NGT in place    PULM: Copious thick secretions, coarse breath sounds bilat    CVS: irregular w bursts of  extreme tachycardia     ABD: Soft, nondistended, nontender, normoactive bowel sounds, no masses    EXT: No edema, nontender, L BKA    SKIN: Warm and well perfused, no rashes noted.    NEURO: Alert, periods of inattention    RADIOLOGY:     CRITICAL CARE TIME SPENT: 60

## 2017-11-18 DIAGNOSIS — J96.90 RESPIRATORY FAILURE, UNSPECIFIED, UNSPECIFIED WHETHER WITH HYPOXIA OR HYPERCAPNIA: ICD-10-CM

## 2017-11-18 LAB
ANION GAP SERPL CALC-SCNC: 8 MMOL/L — SIGNIFICANT CHANGE UP (ref 5–17)
BUN SERPL-MCNC: 18 MG/DL — SIGNIFICANT CHANGE UP (ref 8–20)
CALCIUM SERPL-MCNC: 8.5 MG/DL — LOW (ref 8.6–10.2)
CHLORIDE SERPL-SCNC: 105 MMOL/L — SIGNIFICANT CHANGE UP (ref 98–107)
CO2 SERPL-SCNC: 28 MMOL/L — SIGNIFICANT CHANGE UP (ref 22–29)
CREAT SERPL-MCNC: 0.57 MG/DL — SIGNIFICANT CHANGE UP (ref 0.5–1.3)
CULTURE RESULTS: SIGNIFICANT CHANGE UP
GLUCOSE SERPL-MCNC: 117 MG/DL — HIGH (ref 70–115)
GRAM STN FLD: SIGNIFICANT CHANGE UP
HCT VFR BLD CALC: 30.9 % — LOW (ref 42–52)
HGB BLD-MCNC: 9.8 G/DL — LOW (ref 14–18)
MAGNESIUM SERPL-MCNC: 1.8 MG/DL — SIGNIFICANT CHANGE UP (ref 1.8–2.6)
MCHC RBC-ENTMCNC: 29.9 PG — SIGNIFICANT CHANGE UP (ref 27–31)
MCHC RBC-ENTMCNC: 31.7 G/DL — LOW (ref 32–36)
MCV RBC AUTO: 94.2 FL — HIGH (ref 80–94)
ORGANISM # SPEC MICROSCOPIC CNT: SIGNIFICANT CHANGE UP
PHOSPHATE SERPL-MCNC: 3.5 MG/DL — SIGNIFICANT CHANGE UP (ref 2.4–4.7)
PLATELET # BLD AUTO: 184 K/UL — SIGNIFICANT CHANGE UP (ref 150–400)
POTASSIUM SERPL-MCNC: 4 MMOL/L — SIGNIFICANT CHANGE UP (ref 3.5–5.3)
POTASSIUM SERPL-SCNC: 4 MMOL/L — SIGNIFICANT CHANGE UP (ref 3.5–5.3)
PROCALCITONIN SERPL-MCNC: 0.45 NG/ML — HIGH (ref 0–0.04)
RBC # BLD: 3.28 M/UL — LOW (ref 4.6–6.2)
RBC # FLD: 15.9 % — HIGH (ref 11–15.6)
SODIUM SERPL-SCNC: 141 MMOL/L — SIGNIFICANT CHANGE UP (ref 135–145)
SPECIMEN SOURCE: SIGNIFICANT CHANGE UP
WBC # BLD: 5.7 K/UL — SIGNIFICANT CHANGE UP (ref 4.8–10.8)
WBC # FLD AUTO: 5.7 K/UL — SIGNIFICANT CHANGE UP (ref 4.8–10.8)

## 2017-11-18 PROCEDURE — 99233 SBSQ HOSP IP/OBS HIGH 50: CPT

## 2017-11-18 PROCEDURE — 99232 SBSQ HOSP IP/OBS MODERATE 35: CPT

## 2017-11-18 RX ORDER — OLANZAPINE 15 MG/1
5 TABLET, FILM COATED ORAL AT BEDTIME
Qty: 0 | Refills: 0 | Status: DISCONTINUED | OUTPATIENT
Start: 2017-11-19 | End: 2017-12-06

## 2017-11-18 RX ORDER — OLANZAPINE 15 MG/1
5 TABLET, FILM COATED ORAL AT BEDTIME
Qty: 0 | Refills: 0 | Status: DISCONTINUED | OUTPATIENT
Start: 2017-11-18 | End: 2017-11-18

## 2017-11-18 RX ADMIN — OLANZAPINE 10 MILLIGRAM(S): 15 TABLET, FILM COATED ORAL at 11:05

## 2017-11-18 RX ADMIN — Medication 650 MILLIGRAM(S): at 00:46

## 2017-11-18 RX ADMIN — Medication 3 MILLILITER(S): at 03:02

## 2017-11-18 RX ADMIN — PANTOPRAZOLE SODIUM 40 MILLIGRAM(S): 20 TABLET, DELAYED RELEASE ORAL at 11:05

## 2017-11-18 RX ADMIN — Medication 1 PATCH: at 11:05

## 2017-11-18 RX ADMIN — Medication 3 MILLILITER(S): at 20:31

## 2017-11-18 RX ADMIN — Medication 600 MILLIGRAM(S): at 00:00

## 2017-11-18 RX ADMIN — CHLORHEXIDINE GLUCONATE 15 MILLILITER(S): 213 SOLUTION TOPICAL at 06:11

## 2017-11-18 RX ADMIN — Medication 650 MILLIGRAM(S): at 17:35

## 2017-11-18 RX ADMIN — Medication 81 MILLIGRAM(S): at 11:04

## 2017-11-18 RX ADMIN — Medication 50 MILLIGRAM(S): at 06:14

## 2017-11-18 RX ADMIN — Medication 650 MILLIGRAM(S): at 11:30

## 2017-11-18 RX ADMIN — ENOXAPARIN SODIUM 40 MILLIGRAM(S): 100 INJECTION SUBCUTANEOUS at 11:05

## 2017-11-18 RX ADMIN — Medication 1 MILLIGRAM(S): at 11:04

## 2017-11-18 RX ADMIN — Medication 50 MILLIGRAM(S): at 21:20

## 2017-11-18 RX ADMIN — Medication 1 TABLET(S): at 11:04

## 2017-11-18 RX ADMIN — PIPERACILLIN AND TAZOBACTAM 25 GRAM(S): 4; .5 INJECTION, POWDER, LYOPHILIZED, FOR SOLUTION INTRAVENOUS at 01:23

## 2017-11-18 RX ADMIN — Medication 3 MILLILITER(S): at 08:30

## 2017-11-18 RX ADMIN — CHLORHEXIDINE GLUCONATE 15 MILLILITER(S): 213 SOLUTION TOPICAL at 17:35

## 2017-11-18 RX ADMIN — Medication 3 MILLILITER(S): at 14:08

## 2017-11-18 RX ADMIN — Medication 2 MILLIGRAM(S): at 02:00

## 2017-11-18 RX ADMIN — Medication 50 MILLIGRAM(S): at 14:55

## 2017-11-18 RX ADMIN — PIPERACILLIN AND TAZOBACTAM 25 GRAM(S): 4; .5 INJECTION, POWDER, LYOPHILIZED, FOR SOLUTION INTRAVENOUS at 09:36

## 2017-11-18 RX ADMIN — LISINOPRIL 5 MILLIGRAM(S): 2.5 TABLET ORAL at 06:11

## 2017-11-18 RX ADMIN — TAMSULOSIN HYDROCHLORIDE 0.4 MILLIGRAM(S): 0.4 CAPSULE ORAL at 21:19

## 2017-11-18 NOTE — PROGRESS NOTE ADULT - SUBJECTIVE AND OBJECTIVE BOX
Patient is a 62y old  Male who presents with a chief complaint of Shortness of breath (30 Oct 2017 15:03)      BRIEF HOSPITAL COURSE:     62M EtoH abuse/withdrawl, was admitted, end of october Signed out AMA, but was found severely altered on another hospital floor. Admitted again with severe DT's. Eventually intubated. Unable to wean and was trached.   -was transferred to [facility] (vent floor) but returned to MICU because of afib w/ RVR and fevers    Events last 24 hours:   -required 1 dose of benzo overnight for extreme agitation    PAST MEDICAL & SURGICAL HISTORY:  Falls  Meningitis  Collapsed lung  Alcohol withdrawal  Emphysema of lung  ETOH abuse  Cirrhosis  Afib  CHF (congestive heart failure)  Poor historian  Alcohol abuse  Chronic atrial fibrillation  S/P BKA (below knee amputation), left: secondary to worsening infection in lower leg. Had both ankle injuries from fall s/p repair - left had complication.  S/P BKA (below knee amputation) unilateral, left      Review of Systems:  N/A      Medications:  piperacillin/tazobactam IVPB. 3.375 Gram(s) IV Intermittent every 8 hours    diltiazem    Tablet 60 milliGRAM(s) Oral every 6 hours  lisinopril 5 milliGRAM(s) Oral daily  metoprolol     tartrate 50 milliGRAM(s) Oral every 8 hours  tamsulosin 0.4 milliGRAM(s) Oral at bedtime    ALBUTerol    0.083% 2.5 milliGRAM(s) Nebulizer every 4 hours PRN  ALBUTerol/ipratropium for Nebulization 3 milliLiter(s) Nebulizer every 6 hours    acetaminophen    Suspension 650 milliGRAM(s) Oral every 6 hours PRN  LORazepam   Injectable 2 milliGRAM(s) IV Push once  OLANZapine 10 milliGRAM(s) Oral daily  ondansetron Injectable 8 milliGRAM(s) IV Push every 6 hours PRN      aspirin  chewable 81 milliGRAM(s) Oral daily  enoxaparin Injectable 40 milliGRAM(s) SubCutaneous daily    bisacodyl Suppository 10 milliGRAM(s) Rectal daily PRN  lactulose Syrup 10 Gram(s) Oral every 12 hours PRN  pantoprazole  Injectable 40 milliGRAM(s) IV Push daily        folic acid 1 milliGRAM(s) Oral daily  multivitamin 1 Tablet(s) Oral daily    influenza   Vaccine 0.5 milliLiter(s) IntraMuscular once    chlorhexidine 0.12% Liquid 15 milliLiter(s) Swish and Spit two times a day    nicotine - 21 mG/24Hr(s) Patch 1 patch Transdermal daily      Mode: AC/ CMV (Assist Control/ Continuous Mandatory Ventilation)  RR (machine): 12  TV (machine): 480  FiO2: 30  PEEP: 5  MAP: 10  PIP: 21      ICU Vital Signs Last 24 Hrs  T(C): 38.9 (2017 00:00), Max: 39.4 (2017 11:40)  T(F): 102 (2017 00:00), Max: 102.9 (2017 11:40)  HR: 87 (2017 00:26) (79 - 152)  BP: 114/59 (2017 00:00) (80/55 - 132/69)  BP(mean): 81 (2017 00:00) (63 - 97)  ABP: --  ABP(mean): --  RR: 28 (2017 00:26) (17 - 35)  SpO2: 96% (2017 00:26) (96% - 100%)          I&O's Detail    2017 07:01  -  2017 07:00  --------------------------------------------------------  IN:    Enteral Tube Flush: 100 mL    Free Water: 1000 mL    Glucerna: 1380 mL    sodium chloride 0.45%: 150 mL    Solution: 275 mL    Solution: 200 mL  Total IN: 3105 mL    OUT:    Incontinent per Condom Catheter: 1100 mL    Voided: 700 mL  Total OUT: 1800 mL    Total NET: 1305 mL      2017 07:01  -  2017 02:07  --------------------------------------------------------  IN:    Enteral Tube Flush: 50 mL    Free Water: 750 mL    Glucerna: 900 mL    Solution: 200 mL  Total IN: 1900 mL    OUT:    Incontinent per Condom Catheter: 600 mL  Total OUT: 600 mL    Total NET: 1300 mL            LABS:                        10.1   4.9   )-----------( 195      ( 2017 05:31 )             32.1         142  |  105  |  18.0  ----------------------------<  111  4.1   |  28.0  |  0.60    Ca    8.5<L>      2017 05:31  Phos  2.8       Mg     1.8                 CAPILLARY BLOOD GLUCOSE  94 (2017 17:00)      POCT Blood Glucose.: 103 mg/dL (2017 05:23)      Urinalysis Basic - ( 2017 12:35 )    Color: Yellow / Appearance: Clear / S.015 / pH: x  Gluc: x / Ketone: Negative  / Bili: Negative / Urobili: 8 mg/dL   Blood: x / Protein: 15 mg/dL / Nitrite: Negative   Leuk Esterase: Negative / RBC: x / WBC 0-2   Sq Epi: x / Non Sq Epi: x / Bacteria: x      CULTURES:  Culture Results:   Numerous Pseudomonas aeruginosa  Moderate Yeast  No Routine respiratory raymundo present (17 @ 12:46)  Culture Results:   No growth at 48 hours (17 @ 09:16)  Culture Results:   No growth at 48 hours (17 @ 09:16)  Culture Results:   Growth in anaerobic bottle: Gram Positive Cocci in Clusters  Anaerobic Bottle: 27:25 Hours to positivity  Aerobic Bottle: No growth to date  .  ***Blood Panel PCR results on this specimen are available  approximately 3 hours after the Gram stain result.***  Gram stain, PCR, and/or culture results may not always  correspond due to difference in methodologies.  ************************************************************  This PCR assay was performed using [employer].  The following targets are tested for: Enterococcus,  vancomycin resistant enterococci, Listeria monocytogenes,  coagulase negative staphylococci, S. aureus,  methicillin resistant S. aureus, Streptococcus agalactiae  (Group B), S. pneumoniae, S. pyogenes (Group A),  Acinetobacter baumannii, Enterobacter cloacae, E. coli,  Klebsiella oxytoca, K. pneumoniae, Proteus sp.,  Serratia marcescens, Haemophilus influenzae,  Neisseria meningitidis, Pseudomonas aeruginosa, Candida  albicans, C. glabrata, C krusei, C parapsilosis,  C. tropicalis and the KPC resistance gene.  .  TYPE: (C=Critical, N=Notification, A=Abnormal) C  TESTS:  _ bld   DATE/TIME CALLED: _ 2017 15:17:52  CALLED TO: Ana Michelle RN  READ BACK (2 Patient Identifiers)(Y/N): _ Y  READ BACK VALUES (Y/N): _ Y  CALLED BY: Ana silva (17 @ 10:58)      Physical Examination:    General: Agitated, on vent via trach    HEENT: Pupils equal, reactive to light.  Symmetric.    PULM: diminished at bases  bilaterally, no significant sputum production    CVS: Regular rate and rhythm, no murmurs, rubs, or gallops    ABD: Soft, nondistended, nontender, normoactive bowel sounds, no masses    EXT: No edema, nontender. L BKA    SKIN: Warm and well perfused, no rashes noted.    RADIOLOGY: ***    CRITICAL CARE TIME SPENT: minutes spent evaluating/treating patient, reviewing labs/imaging, and discussing care with bedside team/family

## 2017-11-18 NOTE — PROGRESS NOTE ADULT - SUBJECTIVE AND OBJECTIVE BOX
Patient is a 62y old  Male who presents with a chief complaint of Shortness of breath (30 Oct 2017 15:03)      BRIEF HOSPITAL COURSE: 63 y/o M with a h/o COPD, a-fib (no a/c), CHF, alcoholic cirrhosis, EtOH abuse, EtOH withdrawal (intubated in past for airway protection), initially admitted on 10/28 for COPD exacerbation and alcohol intoxication, signed out AMA and was found later on in hospital disoriented. Readmitted and RRT called later on when patient became tremulous, agitated, delirious, and began hallucinating. Transferred to MICU for further management with benzos and precedex gtt. Patient became obtunded, lost ability to protect airway, and was subsequently intubated. As per report, patient is highly sensitive to benzodiazepines.  CT head neg for acute events. Course complicated by a-fib with RVR, sepsis, pseudomonas aspiration pneumonia. Self extubated on  without need for reintubation initially but on the morning of  patient obtunded  with difficulty managing secretions. Reintubated for airway protection. Pt has had recurrent ruptured  balloons and ETT breakage.  He had failed his weaning trials and had a tracheostomy placed on Thurs. .  Brought back to the ICU secondary to for fever and poorly controlled AF     Events last 24 hours: tolerated 18/5 for 45 minutes     PAST MEDICAL & SURGICAL HISTORY:  Falls  Meningitis  Collapsed lung  Alcohol withdrawal  Emphysema of lung  ETOH abuse  Cirrhosis  Afib  CHF (congestive heart failure)  Poor historian  Alcohol abuse  Chronic atrial fibrillation  S/P BKA (below knee amputation), left: secondary to worsening infection in lower leg. Had both ankle injuries from fall s/p repair - left had complication.  S/P BKA (below knee amputation) unilateral, left      Review of Systems:  unable to obtain      Medications:  piperacillin/tazobactam IVPB. 3.375 Gram(s) IV Intermittent every 8 hours    diltiazem    Tablet 60 milliGRAM(s) Oral every 6 hours  lisinopril 5 milliGRAM(s) Oral daily  metoprolol     tartrate 50 milliGRAM(s) Oral every 8 hours  tamsulosin 0.4 milliGRAM(s) Oral at bedtime    ALBUTerol    0.083% 2.5 milliGRAM(s) Nebulizer every 4 hours PRN  ALBUTerol/ipratropium for Nebulization 3 milliLiter(s) Nebulizer every 6 hours    acetaminophen    Suspension 650 milliGRAM(s) Oral every 6 hours PRN  OLANZapine 10 milliGRAM(s) Oral daily  ondansetron Injectable 8 milliGRAM(s) IV Push every 6 hours PRN      aspirin  chewable 81 milliGRAM(s) Oral daily  enoxaparin Injectable 40 milliGRAM(s) SubCutaneous daily    bisacodyl Suppository 10 milliGRAM(s) Rectal daily PRN  lactulose Syrup 10 Gram(s) Oral every 12 hours PRN  pantoprazole  Injectable 40 milliGRAM(s) IV Push daily        folic acid 1 milliGRAM(s) Oral daily  multivitamin 1 Tablet(s) Oral daily    influenza   Vaccine 0.5 milliLiter(s) IntraMuscular once    chlorhexidine 0.12% Liquid 15 milliLiter(s) Swish and Spit two times a day    nicotine - 21 mG/24Hr(s) Patch 1 patch Transdermal daily      Mode: AC/ CMV (Assist Control/ Continuous Mandatory Ventilation)  RR (machine): 12  TV (machine): 480  FiO2: 30  PEEP: 5      ICU Vital Signs Last 24 Hrs  T(C): 38.7 (2017 11:00), Max: 39.4 (2017 23:00)  T(F): 101.7 (2017 11:00), Max: 102.9 (2017 23:00)  HR: 94 (2017 11:00) (69 - 145)  BP: 113/58 (2017 11:00) (80/55 - 132/69)  BP(mean): 83 (2017 11:00) (62 - 92)  ABP: --  ABP(mean): --  RR: 33 (2017 11:00) (17 - 33)  SpO2: 98% (2017 11:00) (76% - 100%)          I&O's Detail    2017 07:01  -  2017 07:00  --------------------------------------------------------  IN:    Enteral Tube Flush: 50 mL    Free Water: 1000 mL    Glucerna: 1260 mL    Solution: 300 mL  Total IN: 2610 mL    OUT:    Incontinent per Condom Catheter: 1220 mL  Total OUT: 1220 mL    Total NET: 1390 mL      2017 07:01  -  2017 11:58  --------------------------------------------------------  IN:    Enteral Tube Flush: 50 mL    Glucerna: 240 mL    Solution: 50 mL  Total IN: 340 mL    OUT:  Total OUT: 0 mL    Total NET: 340 mL            LABS:                        9.8    5.7   )-----------( 184      ( 2017 08:49 )             30.9         141  |  105  |  18.0  ----------------------------<  117<H>  4.0   |  28.0  |  0.57    Ca    8.5<L>      2017 08:49  Phos  3.5       Mg     1.8                 CAPILLARY BLOOD GLUCOSE  94 (2017 17:00)      POCT Blood Glucose.: 103 mg/dL (2017 05:23)      Urinalysis Basic - ( 2017 12:35 )    Color: Yellow / Appearance: Clear / S.015 / pH: x  Gluc: x / Ketone: Negative  / Bili: Negative / Urobili: 8 mg/dL   Blood: x / Protein: 15 mg/dL / Nitrite: Negative   Leuk Esterase: Negative / RBC: x / WBC 0-2   Sq Epi: x / Non Sq Epi: x / Bacteria: x      CULTURES:  Culture Results:   Numerous Pseudomonas aeruginosa  Moderate Yeast  No Routine respiratory raymundo present (17 @ 12:46)  Culture Results:   No growth at 5 days. (17 @ 09:16)  Culture Results:   No growth at 5 days. (17 @ 09:16)  Culture Results:   Growth in anaerobic bottle: Gram Positive Cocci in Clusters  Anaerobic Bottle: 27:25 Hours to positivity  Aerobic Bottle: No growth to date  .  ***Blood Panel PCR results on this specimen are available  approximately 3 hours after the Gram stain result.***  Gram stain, PCR, and/or culture results may not always  correspond due to difference in methodologies.  ************************************************************  This PCR assay was performed using [employer].  The following targets are tested for: Enterococcus,  vancomycin resistant enterococci, Listeria monocytogenes,  coagulase negative staphylococci, S. aureus,  methicillin resistant S. aureus, Streptococcus agalactiae  (Group B), S. pneumoniae, S. pyogenes (Group A),  Acinetobacter baumannii, Enterobacter cloacae, E. coli,  Klebsiella oxytoca, K. pneumoniae, Proteus sp.,  Serratia marcescens, Haemophilus influenzae,  Neisseria meningitidis, Pseudomonas aeruginosa, Candida  albicans, C. glabrata, C krusei, C parapsilosis,  C. tropicalis and the KPC resistance gene.  .  TYPE: (C=Critical, N=Notification, A=Abnormal) C  TESTS:  _ bld   DATE/TIME CALLED: _ 2017 15:17:52  CALLED TO: Ana Michelle RN  READ BACK (2 Patient Identifiers)(Y/N): _ Y  READ BACK VALUES (Y/N): _ Y  CALLED BY: Ana silva (17 @ 10:58)      Physical Examination:    General: No acute distress.  resting comfortably    HEENT: Pupils equal, reactive to light.  Symmetric.    PULM: diminished    CVS: afib    ABD: Soft, nondistended, nontender, normoactive bowel sounds, no masses    EXT: No edema, nontender, left bka    SKIN: Warm and well perfused, no rashes noted.

## 2017-11-18 NOTE — PROGRESS NOTE ADULT - SUBJECTIVE AND OBJECTIVE BOX
Asked to reevaluate pt. for PEG tube placement for altered mental status which has not improved since we last saw him a few days ago. He is presently on TF for his altered mental status    MEDICATIONS:  MEDICATIONS  (STANDING):  ALBUTerol/ipratropium for Nebulization 3 milliLiter(s) Nebulizer every 6 hours  aspirin  chewable 81 milliGRAM(s) Oral daily  chlorhexidine 0.12% Liquid 15 milliLiter(s) Swish and Spit two times a day  diltiazem    Tablet 60 milliGRAM(s) Oral every 6 hours  enoxaparin Injectable 40 milliGRAM(s) SubCutaneous daily  folic acid 1 milliGRAM(s) Oral daily  influenza   Vaccine 0.5 milliLiter(s) IntraMuscular once  lisinopril 5 milliGRAM(s) Oral daily  metoprolol     tartrate 50 milliGRAM(s) Oral every 8 hours  multivitamin 1 Tablet(s) Oral daily  nicotine - 21 mG/24Hr(s) Patch 1 patch Transdermal daily  OLANZapine 10 milliGRAM(s) Oral daily  pantoprazole  Injectable 40 milliGRAM(s) IV Push daily  piperacillin/tazobactam IVPB. 3.375 Gram(s) IV Intermittent every 8 hours  tamsulosin 0.4 milliGRAM(s) Oral at bedtime    MEDICATIONS  (PRN):  acetaminophen    Suspension 650 milliGRAM(s) Oral every 6 hours PRN For Temp greater than 38 C (100.4 F)  ALBUTerol    0.083% 2.5 milliGRAM(s) Nebulizer every 4 hours PRN Shortness of Breath and/or Wheezing  bisacodyl Suppository 10 milliGRAM(s) Rectal daily PRN Constipation  lactulose Syrup 10 Gram(s) Oral every 12 hours PRN No bowel Movements for 24 hours  ondansetron Injectable 8 milliGRAM(s) IV Push every 6 hours PRN Vomiting      Allergies    Ceclor (Rash)    Intolerances        Vital Signs Last 24 Hrs  T(C): 38.7 (2017 11:00), Max: 39.4 (2017 23:00)  T(F): 101.7 (2017 11:00), Max: 102.9 (2017 23:00)  HR: 94 (2017 11:00) (69 - 116)  BP: 113/58 (2017 11:00) (80/55 - 132/69)  BP(mean): 83 (2017 11:00) (62 - 92)  RR: 33 (2017 11:00) (17 - 33)  SpO2: 98% (2017 11:00) (76% - 100%)     @ 07:  -   @ 07:00  --------------------------------------------------------  IN: 2610 mL / OUT: 1220 mL / NET: 1390 mL     @ 07:01   @ 12:06  --------------------------------------------------------  IN: 340 mL / OUT: 0 mL / NET: 340 mL        PHYSICAL EXAM:    HEENT: MMM, conjunctiva pink and sclera anicteric.  Gastrointestinal: Abdomen: Soft non-tender non-distended; Normal bowel sounds; No hepatosplenomegaly  Extremities: no cyanosis, clubbing or edema.  Skin: Warm and dry. No obvious rash.    LABS:      CBC Full  -  ( 2017 08:49 )  WBC Count : 5.7 K/uL  Hemoglobin : 9.8 g/dL  Hematocrit : 30.9 %  Platelet Count - Automated : 184 K/uL  Mean Cell Volume : 94.2 fl  Mean Cell Hemoglobin : 29.9 pg  Mean Cell Hemoglobin Concentration : 31.7 g/dL  Auto Neutrophil # : x  Auto Lymphocyte # : x  Auto Monocyte # : x  Auto Eosinophil # : x  Auto Basophil # : x  Auto Neutrophil % : x  Auto Lymphocyte % : x  Auto Monocyte % : x  Auto Eosinophil % : x  Auto Basophil % : x        141  |  105  |  18.0  ----------------------------<  117<H>  4.0   |  28.0  |  0.57    Ca    8.5<L>      2017 08:49  Phos  3.5       Mg     1.8     11-18            Urinalysis Basic - ( 2017 12:35 )    Color: Yellow / Appearance: Clear / S.015 / pH: x  Gluc: x / Ketone: Negative  / Bili: Negative / Urobili: 8 mg/dL   Blood: x / Protein: 15 mg/dL / Nitrite: Negative   Leuk Esterase: Negative / RBC: x / WBC 0-2   Sq Epi: x / Non Sq Epi: x / Bacteria: x                RADIOLOGY & ADDITIONAL STUDIES (The following images were personally reviewed): Asked to reevaluate pt. for PEG tube placement for altered mental status which has not improved since we last saw him a few days ago. He is presently on TF for his altered mental status and is trached and intubated.    MEDICATIONS:  MEDICATIONS  (STANDING):  ALBUTerol/ipratropium for Nebulization 3 milliLiter(s) Nebulizer every 6 hours  aspirin  chewable 81 milliGRAM(s) Oral daily  chlorhexidine 0.12% Liquid 15 milliLiter(s) Swish and Spit two times a day  diltiazem    Tablet 60 milliGRAM(s) Oral every 6 hours  enoxaparin Injectable 40 milliGRAM(s) SubCutaneous daily  folic acid 1 milliGRAM(s) Oral daily  influenza   Vaccine 0.5 milliLiter(s) IntraMuscular once  lisinopril 5 milliGRAM(s) Oral daily  metoprolol     tartrate 50 milliGRAM(s) Oral every 8 hours  multivitamin 1 Tablet(s) Oral daily  nicotine - 21 mG/24Hr(s) Patch 1 patch Transdermal daily  OLANZapine 10 milliGRAM(s) Oral daily  pantoprazole  Injectable 40 milliGRAM(s) IV Push daily  piperacillin/tazobactam IVPB. 3.375 Gram(s) IV Intermittent every 8 hours  tamsulosin 0.4 milliGRAM(s) Oral at bedtime    MEDICATIONS  (PRN):  acetaminophen    Suspension 650 milliGRAM(s) Oral every 6 hours PRN For Temp greater than 38 C (100.4 F)  ALBUTerol    0.083% 2.5 milliGRAM(s) Nebulizer every 4 hours PRN Shortness of Breath and/or Wheezing  bisacodyl Suppository 10 milliGRAM(s) Rectal daily PRN Constipation  lactulose Syrup 10 Gram(s) Oral every 12 hours PRN No bowel Movements for 24 hours  ondansetron Injectable 8 milliGRAM(s) IV Push every 6 hours PRN Vomiting      Allergies    Ceclor (Rash)    Intolerances        Vital Signs Last 24 Hrs  T(C): 38.7 (2017 11:00), Max: 39.4 (2017 23:00)  T(F): 101.7 (2017 11:00), Max: 102.9 (2017 23:00)  HR: 94 (2017 11:00) (69 - 116)  BP: 113/58 (2017 11:00) (80/55 - 132/69)  BP(mean): 83 (2017 11:00) (62 - 92)  RR: 33 (2017 11:00) (17 - 33)  SpO2: 98% (2017 11:00) (76% - 100%)     @ 07:01  -   @ 07:00  --------------------------------------------------------  IN: 2610 mL / OUT: 1220 mL / NET: 1390 mL     @ 07:01  -   @ 12:06  --------------------------------------------------------  IN: 340 mL / OUT: 0 mL / NET: 340 mL        PHYSICAL EXAM:    HEENT: MMM, conjunctiva pink and sclera anicteric.  Gastrointestinal: Abdomen: Soft non-tender non-distended; Normal bowel sounds; No hepatosplenomegaly  Extremities: no cyanosis, clubbing or edema.  Skin: Warm and dry. No obvious rash.    LABS:      CBC Full  -  ( 2017 08:49 )  WBC Count : 5.7 K/uL  Hemoglobin : 9.8 g/dL  Hematocrit : 30.9 %  Platelet Count - Automated : 184 K/uL  Mean Cell Volume : 94.2 fl  Mean Cell Hemoglobin : 29.9 pg  Mean Cell Hemoglobin Concentration : 31.7 g/dL  Auto Neutrophil # : x  Auto Lymphocyte # : x  Auto Monocyte # : x  Auto Eosinophil # : x  Auto Basophil # : x  Auto Neutrophil % : x  Auto Lymphocyte % : x  Auto Monocyte % : x  Auto Eosinophil % : x  Auto Basophil % : x        141  |  105  |  18.0  ----------------------------<  117<H>  4.0   |  28.0  |  0.57    Ca    8.5<L>      2017 08:49  Phos  3.5     11-18  Mg     1.8     11-18            Urinalysis Basic - ( 2017 12:35 )    Color: Yellow / Appearance: Clear / S.015 / pH: x  Gluc: x / Ketone: Negative  / Bili: Negative / Urobili: 8 mg/dL   Blood: x / Protein: 15 mg/dL / Nitrite: Negative   Leuk Esterase: Negative / RBC: x / WBC 0-2   Sq Epi: x / Non Sq Epi: x / Bacteria: x                RADIOLOGY & ADDITIONAL STUDIES (The following images were personally reviewed): Asked to reevaluate pt. for PEG tube placement for altered mental status which has not improved since we last saw him a few days ago. He is presently on TF for his altered mental status and is trached and intubated.    MEDICATIONS:  MEDICATIONS  (STANDING):  ALBUTerol/ipratropium for Nebulization 3 milliLiter(s) Nebulizer every 6 hours  aspirin  chewable 81 milliGRAM(s) Oral daily  chlorhexidine 0.12% Liquid 15 milliLiter(s) Swish and Spit two times a day  diltiazem    Tablet 60 milliGRAM(s) Oral every 6 hours  enoxaparin Injectable 40 milliGRAM(s) SubCutaneous daily  folic acid 1 milliGRAM(s) Oral daily  influenza   Vaccine 0.5 milliLiter(s) IntraMuscular once  lisinopril 5 milliGRAM(s) Oral daily  metoprolol     tartrate 50 milliGRAM(s) Oral every 8 hours  multivitamin 1 Tablet(s) Oral daily  nicotine - 21 mG/24Hr(s) Patch 1 patch Transdermal daily  OLANZapine 10 milliGRAM(s) Oral daily  pantoprazole  Injectable 40 milliGRAM(s) IV Push daily  piperacillin/tazobactam IVPB. 3.375 Gram(s) IV Intermittent every 8 hours  tamsulosin 0.4 milliGRAM(s) Oral at bedtime    MEDICATIONS  (PRN):  acetaminophen    Suspension 650 milliGRAM(s) Oral every 6 hours PRN For Temp greater than 38 C (100.4 F)  ALBUTerol    0.083% 2.5 milliGRAM(s) Nebulizer every 4 hours PRN Shortness of Breath and/or Wheezing  bisacodyl Suppository 10 milliGRAM(s) Rectal daily PRN Constipation  lactulose Syrup 10 Gram(s) Oral every 12 hours PRN No bowel Movements for 24 hours  ondansetron Injectable 8 milliGRAM(s) IV Push every 6 hours PRN Vomiting      Allergies    Ceclor (Rash)    Intolerances        Vital Signs Last 24 Hrs  T(C): 38.7 (2017 11:00), Max: 39.4 (2017 23:00)  T(F): 101.7 (2017 11:00), Max: 102.9 (2017 23:00)  HR: 94 (2017 11:00) (69 - 116)  BP: 113/58 (2017 11:00) (80/55 - 132/69)  BP(mean): 83 (2017 11:00) (62 - 92)  RR: 33 (2017 11:00) (17 - 33)  SpO2: 98% (2017 11:00) (76% - 100%)     @ 07:01  -   @ 07:00  --------------------------------------------------------  IN: 2610 mL / OUT: 1220 mL / NET: 1390 mL     @ 07:01  -   @ 12:06  --------------------------------------------------------  IN: 340 mL / OUT: 0 mL / NET: 340 mL        PHYSICAL EXAM:  General: Intubated trached in NAD when seen.  HEENT: MMM, conjunctiva pink and sclera anicteric.  Gastrointestinal: Abdomen: Soft non-tender non-distended; Normal bowel sounds; No hepatosplenomegaly  Extremities: no cyanosis, clubbing or edema.  Skin: Warm and dry. No obvious rash.    LABS:      CBC Full  -  ( 2017 08:49 )  WBC Count : 5.7 K/uL  Hemoglobin : 9.8 g/dL  Hematocrit : 30.9 %  Platelet Count - Automated : 184 K/uL  Mean Cell Volume : 94.2 fl  Mean Cell Hemoglobin : 29.9 pg  Mean Cell Hemoglobin Concentration : 31.7 g/dL  Auto Neutrophil # : x  Auto Lymphocyte # : x  Auto Monocyte # : x  Auto Eosinophil # : x  Auto Basophil # : x  Auto Neutrophil % : x  Auto Lymphocyte % : x  Auto Monocyte % : x  Auto Eosinophil % : x  Auto Basophil % : x        141  |  105  |  18.0  ----------------------------<  117<H>  4.0   |  28.0  |  0.57    Ca    8.5<L>      2017 08:49  Phos  3.5     11-18  Mg     1.8     11-18            Urinalysis Basic - ( 2017 12:35 )    Color: Yellow / Appearance: Clear / S.015 / pH: x  Gluc: x / Ketone: Negative  / Bili: Negative / Urobili: 8 mg/dL   Blood: x / Protein: 15 mg/dL / Nitrite: Negative   Leuk Esterase: Negative / RBC: x / WBC 0-2   Sq Epi: x / Non Sq Epi: x / Bacteria: x                RADIOLOGY & ADDITIONAL STUDIES (The following images were personally reviewed):

## 2017-11-18 NOTE — PROGRESS NOTE ADULT - PROBLEM SELECTOR PLAN 1
-Patient currently on Full vent support  -titrate settings to maintain SaO2 >90%, or pH >7.25  -consider low tidal volume ventilation strategy w/ goal Tv 4-6 cc/kg of ideal body weight  -plateu pressure goal <30  -Peridex oral care  -aggressive pulmonary toilet  -daily sedation vacation with spontaneous breathing trial if clinical condition warrants, discuss with respiratory therapy

## 2017-11-18 NOTE — PROGRESS NOTE ADULT - ASSESSMENT
62 yom copd, afib, cirrhosis, ETOH abuse, ETOH withdrawal with acute respiratory failure, s/p trach now with recurrent fever    Rass - 0  Cam - +  VAP bundle - yes  GI ppx - yes  DVT ppx - yes  Nutrition - TF    Invasive Devices: tracheostomy    Advanced directives: full code

## 2017-11-18 NOTE — PROGRESS NOTE ADULT - PROBLEM SELECTOR PLAN 2
Continue current IV antibiotics. Repeat CXR next week to re-evaluate pneumonia prior to consideration for PEG tube placement. Continue current IV antibiotics. Repeat CXR next week to re-evaluate pneumonia prior to consideration for PEG tube placement. Pulmonary re-evaluation and clearance for PEG tube placement early next week.

## 2017-11-18 NOTE — PROGRESS NOTE ADULT - PROBLEM SELECTOR PLAN 1
Will re-evaluate pt. early next week for possible PEG tube placement if he remains altered. Would need to hold daily ASA 81 mg. therapy if possible for eventual PEG tube placement. Continue current TF until then.

## 2017-11-19 LAB
ANION GAP SERPL CALC-SCNC: 10 MMOL/L — SIGNIFICANT CHANGE UP (ref 5–17)
BUN SERPL-MCNC: 20 MG/DL — SIGNIFICANT CHANGE UP (ref 8–20)
CALCIUM SERPL-MCNC: 8.6 MG/DL — SIGNIFICANT CHANGE UP (ref 8.6–10.2)
CHLORIDE SERPL-SCNC: 105 MMOL/L — SIGNIFICANT CHANGE UP (ref 98–107)
CO2 SERPL-SCNC: 28 MMOL/L — SIGNIFICANT CHANGE UP (ref 22–29)
CREAT SERPL-MCNC: 0.57 MG/DL — SIGNIFICANT CHANGE UP (ref 0.5–1.3)
GLUCOSE SERPL-MCNC: 105 MG/DL — SIGNIFICANT CHANGE UP (ref 70–115)
HCT VFR BLD CALC: 29.2 % — LOW (ref 42–52)
HGB BLD-MCNC: 9.2 G/DL — LOW (ref 14–18)
MAGNESIUM SERPL-MCNC: 1.9 MG/DL — SIGNIFICANT CHANGE UP (ref 1.6–2.6)
MCHC RBC-ENTMCNC: 30 PG — SIGNIFICANT CHANGE UP (ref 27–31)
MCHC RBC-ENTMCNC: 31.5 G/DL — LOW (ref 32–36)
MCV RBC AUTO: 95.1 FL — HIGH (ref 80–94)
PHOSPHATE SERPL-MCNC: 3 MG/DL — SIGNIFICANT CHANGE UP (ref 2.4–4.7)
PLATELET # BLD AUTO: 171 K/UL — SIGNIFICANT CHANGE UP (ref 150–400)
POTASSIUM SERPL-MCNC: 4.1 MMOL/L — SIGNIFICANT CHANGE UP (ref 3.5–5.3)
POTASSIUM SERPL-SCNC: 4.1 MMOL/L — SIGNIFICANT CHANGE UP (ref 3.5–5.3)
PROCALCITONIN SERPL-MCNC: 0.5 NG/ML — HIGH (ref 0–0.04)
RBC # BLD: 3.07 M/UL — LOW (ref 4.6–6.2)
RBC # FLD: 16.3 % — HIGH (ref 11–15.6)
SODIUM SERPL-SCNC: 143 MMOL/L — SIGNIFICANT CHANGE UP (ref 135–145)
WBC # BLD: 6.5 K/UL — SIGNIFICANT CHANGE UP (ref 4.8–10.8)
WBC # FLD AUTO: 6.5 K/UL — SIGNIFICANT CHANGE UP (ref 4.8–10.8)

## 2017-11-19 PROCEDURE — 99233 SBSQ HOSP IP/OBS HIGH 50: CPT

## 2017-11-19 RX ORDER — HALOPERIDOL DECANOATE 100 MG/ML
5 INJECTION INTRAMUSCULAR ONCE
Qty: 0 | Refills: 0 | Status: COMPLETED | OUTPATIENT
Start: 2017-11-19 | End: 2017-11-19

## 2017-11-19 RX ORDER — DEXMEDETOMIDINE HYDROCHLORIDE IN 0.9% SODIUM CHLORIDE 4 UG/ML
0.2 INJECTION INTRAVENOUS
Qty: 200 | Refills: 0 | Status: DISCONTINUED | OUTPATIENT
Start: 2017-11-19 | End: 2017-11-22

## 2017-11-19 RX ORDER — HALOPERIDOL DECANOATE 100 MG/ML
2.5 INJECTION INTRAMUSCULAR ONCE
Qty: 0 | Refills: 0 | Status: COMPLETED | OUTPATIENT
Start: 2017-11-19 | End: 2017-11-19

## 2017-11-19 RX ADMIN — Medication 1 TABLET(S): at 11:44

## 2017-11-19 RX ADMIN — PANTOPRAZOLE SODIUM 40 MILLIGRAM(S): 20 TABLET, DELAYED RELEASE ORAL at 11:44

## 2017-11-19 RX ADMIN — Medication 50 MILLIGRAM(S): at 14:38

## 2017-11-19 RX ADMIN — CHLORHEXIDINE GLUCONATE 15 MILLILITER(S): 213 SOLUTION TOPICAL at 05:33

## 2017-11-19 RX ADMIN — Medication 81 MILLIGRAM(S): at 11:44

## 2017-11-19 RX ADMIN — CHLORHEXIDINE GLUCONATE 15 MILLILITER(S): 213 SOLUTION TOPICAL at 17:48

## 2017-11-19 RX ADMIN — DEXMEDETOMIDINE HYDROCHLORIDE IN 0.9% SODIUM CHLORIDE 5.21 MICROGRAM(S)/KG/HR: 4 INJECTION INTRAVENOUS at 14:38

## 2017-11-19 RX ADMIN — OLANZAPINE 5 MILLIGRAM(S): 15 TABLET, FILM COATED ORAL at 22:26

## 2017-11-19 RX ADMIN — Medication 3 MILLILITER(S): at 20:46

## 2017-11-19 RX ADMIN — Medication 3 MILLILITER(S): at 09:40

## 2017-11-19 RX ADMIN — HALOPERIDOL DECANOATE 2.5 MILLIGRAM(S): 100 INJECTION INTRAMUSCULAR at 12:50

## 2017-11-19 RX ADMIN — Medication 50 MILLIGRAM(S): at 05:33

## 2017-11-19 RX ADMIN — Medication 3 MILLILITER(S): at 15:07

## 2017-11-19 RX ADMIN — DEXMEDETOMIDINE HYDROCHLORIDE IN 0.9% SODIUM CHLORIDE 5.21 MICROGRAM(S)/KG/HR: 4 INJECTION INTRAVENOUS at 21:02

## 2017-11-19 RX ADMIN — DEXMEDETOMIDINE HYDROCHLORIDE IN 0.9% SODIUM CHLORIDE 5.21 MICROGRAM(S)/KG/HR: 4 INJECTION INTRAVENOUS at 17:48

## 2017-11-19 RX ADMIN — LISINOPRIL 5 MILLIGRAM(S): 2.5 TABLET ORAL at 05:33

## 2017-11-19 RX ADMIN — HALOPERIDOL DECANOATE 5 MILLIGRAM(S): 100 INJECTION INTRAMUSCULAR at 17:10

## 2017-11-19 RX ADMIN — TAMSULOSIN HYDROCHLORIDE 0.4 MILLIGRAM(S): 0.4 CAPSULE ORAL at 22:27

## 2017-11-19 RX ADMIN — ENOXAPARIN SODIUM 40 MILLIGRAM(S): 100 INJECTION SUBCUTANEOUS at 11:44

## 2017-11-19 RX ADMIN — Medication 3 MILLILITER(S): at 03:16

## 2017-11-19 RX ADMIN — Medication 1 MILLIGRAM(S): at 11:44

## 2017-11-19 RX ADMIN — Medication 1 PATCH: at 11:45

## 2017-11-19 RX ADMIN — Medication 50 MILLIGRAM(S): at 22:27

## 2017-11-19 RX ADMIN — Medication 1 PATCH: at 11:46

## 2017-11-19 NOTE — PROGRESS NOTE ADULT - ASSESSMENT
61 yo male, PMHx COPD, AFib not on AC, CHF, alcoholic cirrhosis, EtOH abuse, EtOH withdrawal (intubated in past for airway protection), initially admitted on 10/28 for COPD exacerbation and alcohol intoxication, delirium tremens, AFib with RVR, Pseudomonas and aspiration PNA, acute respiratory failure now chronic vent-dependent respiratory failure s/p trach     PLAN:  RESPIRATORY FAILURE: Patient currently on full ventilatory support. Will titrate vent settings to maintain SaO2 >90%, or pH >7.25. Consider low tidal volume ventilation strategy w/ goal Tv 4-6 cc/kg of ideal body weight and plateau pressure goal < 30. VAP prophylaxis with Peridex oral care. Aggressive chest PT and suctioning. Daily sedation vacation with spontaneous breathing trial if clinical condition warrants, discuss with respiratory therapy, however pt unlikely to tolerate prolonged vent weaning due to profound encephalopathy and inability to follow commands.    AFIB: Rate controlled on current regimen (Lopressor and Cardizem). Continue to treat PRN with Lopressor IVP for HR > 140    ALTERED MENTAL STATUS: likely encephalopathy related to long-standing EtOH abuse vs ? sepsis. Waxes and wanes, continue to monitor.    FEVER OF UNKNOWN ORIGIN: Completed course of antibiotics today, blood cultures thus far negative however remains febrile > 103. Sputum culture with numerous GNR, however likely related to chronic ventilator-associated colonization, pending speciation. No other clear focus of infection exists at this time, ?consider indium scan. Observe off antibiotics for now. Continue Acetaminophen PRN temp > 101.4'F.

## 2017-11-19 NOTE — PROGRESS NOTE ADULT - SUBJECTIVE AND OBJECTIVE BOX
Patient is a 62y old  Male who presents with a chief complaint of Shortness of breath (30 Oct 2017 15:03)      BRIEF HOSPITAL COURSE: 63 yo male, PMHx COPD, AFib not on AC, CHF, alcoholic cirrhosis, EtOH abuse, EtOH withdrawal (intubated in past for airway protection), initially admitted on 10/28 for COPD exacerbation and alcohol intoxication, signed out AMA and was found later in hospital disoriented. Readmitted and RRT called for development of acute EtOH withdrawal. Transferred to MICU for further management of DTs. Patient subsequently became obtunded, lost ability to protect airway, and was intubated. CT head neg for acute events. Hospital course complicated by AFib with RVR, sepsis, Pseudomonas PNA, aspiration pneumonia. Self extubated on 11/2 without need for reintubation initially, but on the morning of 11/4 patient became obtunded with difficulty managing secretions requiring reintubation. Pt has had recurrent ruptured  balloons and ETT breakage, with multiple failed weaning trials. S/p tracheostomy 11/9.  Readmitted to the ICU secondary to for fever r/o sepsis and recurrent AFib with RVR.    Events last 24 hours: Patient remains trach to vent, encephalopathic, spontaneous eye opening to verbal stimuli, does not follow any other commands. Febrile 103.1'F 2 hours after receiving Tylenol    PAST MEDICAL & SURGICAL HISTORY:  Falls  Meningitis  Collapsed lung  Alcohol withdrawal  Emphysema of lung  ETOH abuse  Cirrhosis  Afib  CHF (congestive heart failure)  Poor historian  Alcohol abuse  Chronic atrial fibrillation  S/P BKA (below knee amputation), left: secondary to worsening infection in lower leg. Had both ankle injuries from fall s/p repair - left had complication.  S/P BKA (below knee amputation) unilateral, left      Review of Systems: unable to obtain due to altered mental status      Medications:  diltiazem    Tablet 60 milliGRAM(s) Oral every 6 hours  lisinopril 5 milliGRAM(s) Oral daily  metoprolol     tartrate 50 milliGRAM(s) Oral every 8 hours  tamsulosin 0.4 milliGRAM(s) Oral at bedtime  ALBUTerol    0.083% 2.5 milliGRAM(s) Nebulizer every 4 hours PRN  ALBUTerol/ipratropium for Nebulization 3 milliLiter(s) Nebulizer every 6 hours  acetaminophen    Suspension 650 milliGRAM(s) Oral every 6 hours PRN  ondansetron Injectable 8 milliGRAM(s) IV Push every 6 hours PRN  aspirin  chewable 81 milliGRAM(s) Oral daily  enoxaparin Injectable 40 milliGRAM(s) SubCutaneous daily  bisacodyl Suppository 10 milliGRAM(s) Rectal daily PRN  lactulose Syrup 10 Gram(s) Oral every 12 hours PRN  pantoprazole  Injectable 40 milliGRAM(s) IV Push daily  folic acid 1 milliGRAM(s) Oral daily  multivitamin 1 Tablet(s) Oral daily  influenza   Vaccine 0.5 milliLiter(s) IntraMuscular once  chlorhexidine 0.12% Liquid 15 milliLiter(s) Swish and Spit two times a day  nicotine - 21 mG/24Hr(s) Patch 1 patch Transdermal daily      Mode: AC/ CMV (Assist Control/ Continuous Mandatory Ventilation)  RR (machine): 12  TV (machine): 480  FiO2: 30  PEEP: 5  MAP: 14  PIP: 27      ICU Vital Signs Last 24 Hrs  T(C): 38.1 (18 Nov 2017 21:00), Max: 39.5 (18 Nov 2017 19:30)  T(F): 100.6 (18 Nov 2017 21:00), Max: 103.1 (18 Nov 2017 19:30)  HR: 96 (18 Nov 2017 21:00) (69 - 116)  BP: 95/50 (18 Nov 2017 21:00) (87/50 - 116/54)  BP(mean): 67 (18 Nov 2017 21:00) (62 - 83)  ABP: --  ABP(mean): --  RR: 15 (18 Nov 2017 21:00) (15 - 42)  SpO2: 96% (18 Nov 2017 21:00) (76% - 100%)          I&O's Detail    17 Nov 2017 07:01  -  18 Nov 2017 07:00  --------------------------------------------------------  IN:    Enteral Tube Flush: 50 mL    Free Water: 1000 mL    Glucerna: 1260 mL    Solution: 300 mL  Total IN: 2610 mL    OUT:    Incontinent per Condom Catheter: 1220 mL  Total OUT: 1220 mL    Total NET: 1390 mL      18 Nov 2017 07:01  -  19 Nov 2017 00:04  --------------------------------------------------------  IN:    Enteral Tube Flush: 150 mL    Free Water: 500 mL    Glucerna: 840 mL    Solution: 100 mL  Total IN: 1590 mL    OUT:    Incontinent per Condom Catheter: 450 mL  Total OUT: 450 mL    Total NET: 1140 mL            LABS:                        9.8    5.7   )-----------( 184      ( 18 Nov 2017 08:49 )             30.9     11-18    141  |  105  |  18.0  ----------------------------<  117<H>  4.0   |  28.0  |  0.57    Ca    8.5<L>      18 Nov 2017 08:49  Phos  3.5     11-18  Mg     1.8     11-18            CAPILLARY BLOOD GLUCOSE      POCT Blood Glucose.: 103 mg/dL (17 Nov 2017 05:23)        CULTURES:  Culture Results:   No growth at 48 hours (11-16 @ 15:37)  Culture Results:   No growth at 48 hours (11-16 @ 15:37)  Culture Results:   Numerous Pseudomonas aeruginosa  Moderate Yeast  No Routine respiratory raymundo present (11-13 @ 12:46)  Culture Results:   No growth at 5 days. (11-13 @ 09:16)  Culture Results:   No growth at 5 days. (11-13 @ 09:16)  Culture Results:   Growth in anaerobic bottle: Gram Positive Cocci in Clusters  Anaerobic Bottle: 27:25 Hours to positivity  Aerobic Bottle: No growth at 5 days.  .  ***Blood Panel PCR results on this specimen are available  approximately 3 hours after the Gram stainresult.***  Gram stain, PCR, and/or culture results may not always  correspond due to difference in methodologies.  ************************************************************  This PCR assay was performed using [employer].  The following targets are tested for: Enterococcus,  vancomycin resistant enterococci, Listeria monocytogenes,  coagulase negative staphylococci, S. aureus,  methicillin resistant S. aureus, Streptococcus agalactiae  (Group B), S. pneumoniae, S. pyogenes (Group A),  Acinetobacter baumannii, Enterobacter cloacae, E. coli,  Klebsiella oxytoca, K. pneumoniae, Proteus sp.,  Serratia marcescens, Haemophilus influenzae,  Neisseria meningitidis, Pseudomonas aeruginosa, Candida  albicans, C. glabrata, C krusei, C parapsilosis,  C. tropicalis and the KPC resistance gene.  .  TYPE: (C=Critical, N=Notification, A=Abnormal) C  TESTS:  _ bld gs  DATE/TIME CALLED: _ 11/13/2017 15:17:52  CALLED TO: Ana Michelle RN  READ BACK (2 Patient Identifiers)(Y/N): _ Y  READ BACK VALUES (Y/N): _ Y  CALLED BY: Ana silva (11-12 @ 10:58)      Physical Examination:    General: No acute distress.      HEENT: Pupils 3mm equal, sluggish reactive to light. Symmetric.    PULM: Trach to vent. Diminished to auscultation bilaterally, no respiratory distress    CVS: AFib regular rate, no murmurs, rubs, or gallops    ABD: Soft, nondistended, nontender, normoactive bowel sounds, no masses    EXT: L BKA. No edema, nontender    SKIN: Warm and well perfused, no rashes noted.    NEURO: RASS -2, spontaneous eye opening to verbal stimuli, moves all 4 extremities, does not follow commands    RADIOLOGY: None recent    CRITICAL CARE TIME SPENT: I have spent 32 minutes of critical care time evaluating and treating this patient, including reviewing charts, labs, imagining studies, and collaborating with interdisciplinary team.

## 2017-11-19 NOTE — PROGRESS NOTE ADULT - ASSESSMENT
62 yom copd, afib, cirrhosis, ETOH abuse, ETOH withdrawal with acute respiratory failure, s/p trach , vent dependent.    Resp: patient's agitation precludes trial of went wean  Neuro: Cam +, no benzos, Haldol PRN an precedex gtt. On MVI and folic acid. Etiology multifactorial including ICU delirium. will have him in sitting position, reorient. Discussed with daughter. Avoid benzos  ID: febrile, off antibiotics,   CVS: hemodynamically stable  GI: tolerating tube feeds, need PEG tube placement   Hem/onc stable  Renal: stable  Metabolic: stable  DVT prophylaxis    Dispo: requires ICU level of care

## 2017-11-20 LAB
-  AMIKACIN: SIGNIFICANT CHANGE UP
-  AZTREONAM: SIGNIFICANT CHANGE UP
-  CEFEPIME: SIGNIFICANT CHANGE UP
-  CEFTAZIDIME: SIGNIFICANT CHANGE UP
-  CIPROFLOXACIN: SIGNIFICANT CHANGE UP
-  GENTAMICIN: SIGNIFICANT CHANGE UP
-  IMIPENEM: SIGNIFICANT CHANGE UP
-  LEVOFLOXACIN: SIGNIFICANT CHANGE UP
-  MEROPENEM: SIGNIFICANT CHANGE UP
-  PIPERACILLIN/TAZOBACTAM: SIGNIFICANT CHANGE UP
-  TOBRAMYCIN: SIGNIFICANT CHANGE UP
ANION GAP SERPL CALC-SCNC: 10 MMOL/L — SIGNIFICANT CHANGE UP (ref 5–17)
BUN SERPL-MCNC: 18 MG/DL — SIGNIFICANT CHANGE UP (ref 8–20)
CALCIUM SERPL-MCNC: 8.8 MG/DL — SIGNIFICANT CHANGE UP (ref 8.6–10.2)
CHLORIDE SERPL-SCNC: 104 MMOL/L — SIGNIFICANT CHANGE UP (ref 98–107)
CO2 SERPL-SCNC: 28 MMOL/L — SIGNIFICANT CHANGE UP (ref 22–29)
CREAT SERPL-MCNC: 0.53 MG/DL — SIGNIFICANT CHANGE UP (ref 0.5–1.3)
CULTURE RESULTS: SIGNIFICANT CHANGE UP
GLUCOSE SERPL-MCNC: 118 MG/DL — HIGH (ref 70–115)
HCT VFR BLD CALC: 30 % — LOW (ref 42–52)
HGB BLD-MCNC: 9.5 G/DL — LOW (ref 14–18)
MAGNESIUM SERPL-MCNC: 1.8 MG/DL — SIGNIFICANT CHANGE UP (ref 1.6–2.6)
MCHC RBC-ENTMCNC: 29.8 PG — SIGNIFICANT CHANGE UP (ref 27–31)
MCHC RBC-ENTMCNC: 31.7 G/DL — LOW (ref 32–36)
MCV RBC AUTO: 94 FL — SIGNIFICANT CHANGE UP (ref 80–94)
METHOD TYPE: SIGNIFICANT CHANGE UP
ORGANISM # SPEC MICROSCOPIC CNT: SIGNIFICANT CHANGE UP
ORGANISM # SPEC MICROSCOPIC CNT: SIGNIFICANT CHANGE UP
PHOSPHATE SERPL-MCNC: 3.4 MG/DL — SIGNIFICANT CHANGE UP (ref 2.4–4.7)
PLATELET # BLD AUTO: 201 K/UL — SIGNIFICANT CHANGE UP (ref 150–400)
POTASSIUM SERPL-MCNC: 4.5 MMOL/L — SIGNIFICANT CHANGE UP (ref 3.5–5.3)
POTASSIUM SERPL-SCNC: 4.5 MMOL/L — SIGNIFICANT CHANGE UP (ref 3.5–5.3)
PROCALCITONIN SERPL-MCNC: 0.31 NG/ML — HIGH (ref 0–0.04)
RBC # BLD: 3.19 M/UL — LOW (ref 4.6–6.2)
RBC # FLD: 16.1 % — HIGH (ref 11–15.6)
SODIUM SERPL-SCNC: 142 MMOL/L — SIGNIFICANT CHANGE UP (ref 135–145)
SPECIMEN SOURCE: SIGNIFICANT CHANGE UP
WBC # BLD: 5.5 K/UL — SIGNIFICANT CHANGE UP (ref 4.8–10.8)
WBC # FLD AUTO: 5.5 K/UL — SIGNIFICANT CHANGE UP (ref 4.8–10.8)

## 2017-11-20 PROCEDURE — 99233 SBSQ HOSP IP/OBS HIGH 50: CPT

## 2017-11-20 PROCEDURE — 71010: CPT | Mod: 26

## 2017-11-20 RX ORDER — OLANZAPINE 15 MG/1
2.5 TABLET, FILM COATED ORAL DAILY
Qty: 0 | Refills: 0 | Status: DISCONTINUED | OUTPATIENT
Start: 2017-11-20 | End: 2017-12-06

## 2017-11-20 RX ORDER — HALOPERIDOL DECANOATE 100 MG/ML
2 INJECTION INTRAMUSCULAR EVERY 6 HOURS
Qty: 0 | Refills: 0 | Status: DISCONTINUED | OUTPATIENT
Start: 2017-11-20 | End: 2017-12-02

## 2017-11-20 RX ORDER — ALBUTEROL 90 UG/1
2.5 AEROSOL, METERED ORAL EVERY 6 HOURS
Qty: 0 | Refills: 0 | Status: DISCONTINUED | OUTPATIENT
Start: 2017-11-20 | End: 2017-11-20

## 2017-11-20 RX ORDER — ACETYLCYSTEINE 200 MG/ML
1 VIAL (ML) MISCELLANEOUS EVERY 8 HOURS
Qty: 0 | Refills: 0 | Status: DISCONTINUED | OUTPATIENT
Start: 2017-11-20 | End: 2017-11-21

## 2017-11-20 RX ORDER — METOPROLOL TARTRATE 50 MG
5 TABLET ORAL EVERY 6 HOURS
Qty: 0 | Refills: 0 | Status: DISCONTINUED | OUTPATIENT
Start: 2017-11-20 | End: 2017-12-14

## 2017-11-20 RX ORDER — DIGOXIN 250 MCG
0.5 TABLET ORAL ONCE
Qty: 0 | Refills: 0 | Status: DISCONTINUED | OUTPATIENT
Start: 2017-11-20 | End: 2017-11-20

## 2017-11-20 RX ORDER — MAGNESIUM SULFATE 500 MG/ML
2 VIAL (ML) INJECTION ONCE
Qty: 0 | Refills: 0 | Status: COMPLETED | OUTPATIENT
Start: 2017-11-20 | End: 2017-11-20

## 2017-11-20 RX ADMIN — Medication 50 MILLIGRAM(S): at 06:21

## 2017-11-20 RX ADMIN — PANTOPRAZOLE SODIUM 40 MILLIGRAM(S): 20 TABLET, DELAYED RELEASE ORAL at 12:53

## 2017-11-20 RX ADMIN — DEXMEDETOMIDINE HYDROCHLORIDE IN 0.9% SODIUM CHLORIDE 5.21 MICROGRAM(S)/KG/HR: 4 INJECTION INTRAVENOUS at 16:53

## 2017-11-20 RX ADMIN — DEXMEDETOMIDINE HYDROCHLORIDE IN 0.9% SODIUM CHLORIDE 5.21 MICROGRAM(S)/KG/HR: 4 INJECTION INTRAVENOUS at 00:59

## 2017-11-20 RX ADMIN — OLANZAPINE 5 MILLIGRAM(S): 15 TABLET, FILM COATED ORAL at 21:07

## 2017-11-20 RX ADMIN — CHLORHEXIDINE GLUCONATE 15 MILLILITER(S): 213 SOLUTION TOPICAL at 06:21

## 2017-11-20 RX ADMIN — Medication 3 MILLILITER(S): at 15:27

## 2017-11-20 RX ADMIN — CHLORHEXIDINE GLUCONATE 15 MILLILITER(S): 213 SOLUTION TOPICAL at 17:56

## 2017-11-20 RX ADMIN — Medication 50 MILLIGRAM(S): at 21:07

## 2017-11-20 RX ADMIN — Medication 1 PATCH: at 12:54

## 2017-11-20 RX ADMIN — Medication 50 GRAM(S): at 08:58

## 2017-11-20 RX ADMIN — Medication 81 MILLIGRAM(S): at 12:58

## 2017-11-20 RX ADMIN — OLANZAPINE 2.5 MILLIGRAM(S): 15 TABLET, FILM COATED ORAL at 11:09

## 2017-11-20 RX ADMIN — Medication 3 MILLILITER(S): at 19:47

## 2017-11-20 RX ADMIN — Medication 3 MILLILITER(S): at 09:20

## 2017-11-20 RX ADMIN — Medication 1 TABLET(S): at 14:09

## 2017-11-20 RX ADMIN — ENOXAPARIN SODIUM 40 MILLIGRAM(S): 100 INJECTION SUBCUTANEOUS at 12:54

## 2017-11-20 RX ADMIN — Medication 10 MILLIGRAM(S): at 11:09

## 2017-11-20 RX ADMIN — HALOPERIDOL DECANOATE 2 MILLIGRAM(S): 100 INJECTION INTRAMUSCULAR at 11:09

## 2017-11-20 RX ADMIN — DEXMEDETOMIDINE HYDROCHLORIDE IN 0.9% SODIUM CHLORIDE 5.21 MICROGRAM(S)/KG/HR: 4 INJECTION INTRAVENOUS at 09:06

## 2017-11-20 RX ADMIN — Medication 3 MILLILITER(S): at 02:58

## 2017-11-20 RX ADMIN — Medication 5 MILLIGRAM(S): at 12:53

## 2017-11-20 RX ADMIN — DEXMEDETOMIDINE HYDROCHLORIDE IN 0.9% SODIUM CHLORIDE 5.21 MICROGRAM(S)/KG/HR: 4 INJECTION INTRAVENOUS at 21:07

## 2017-11-20 RX ADMIN — TAMSULOSIN HYDROCHLORIDE 0.4 MILLIGRAM(S): 0.4 CAPSULE ORAL at 21:07

## 2017-11-20 RX ADMIN — Medication 1 MILLIGRAM(S): at 14:09

## 2017-11-20 RX ADMIN — DEXMEDETOMIDINE HYDROCHLORIDE IN 0.9% SODIUM CHLORIDE 5.21 MICROGRAM(S)/KG/HR: 4 INJECTION INTRAVENOUS at 05:15

## 2017-11-20 NOTE — PROGRESS NOTE ADULT - ASSESSMENT
62 yom with ETOH withdrawal, SVTs, afib trach will need peg, COPD, encephalopathy broading in the ICU due to need to monitor and vent bed    Rass - 0 to + 2  Cam - positive  VAP bundle - yes  GI ppx - yes  DVT ppx - yes  Nutrition - TF    Invasive Devices: trach    Advanced directives: full code

## 2017-11-20 NOTE — PROGRESS NOTE ADULT - SUBJECTIVE AND OBJECTIVE BOX
Patient seen and examined.  Re:  PEG placement.  Unable to be weaned from Vent.  Will require long term placement for vent support.  Awake but not oriented.  NGT in place for tube feeds.  Tolerates Glucerna 1.5 at 60 cc/ hr.  Sono last week showed no evidence for ascites.  Pt had a prior PEG distantly.  No family members currently available.      PAST MEDICAL & SURGICAL HISTORY:  Falls  Meningitis  Collapsed lung  Alcohol withdrawal  Emphysema of lung  ETOH abuse  Cirrhosis  Afib  CHF (congestive heart failure)  Poor historian  Alcohol abuse  Chronic atrial fibrillation  S/P BKA (below knee amputation), left: secondary to worsening infection in lower leg. Had both ankle injuries from fall s/p repair - left had complication.  S/P BKA (below knee amputation) unilateral, left      ROS:  No Heartburn, regurgitation, dysphagia, odynophagia.  No dyspepsia  No abdominal pain.    No Nausea, vomiting.  No Bleeding.  No hematemesis.   No diarrhea.    No hematochesia.  No weight loss, anorexia.  No edema.      MEDICATIONS  (STANDING):  ALBUTerol/ipratropium for Nebulization 3 milliLiter(s) Nebulizer every 6 hours  aspirin  chewable 81 milliGRAM(s) Oral daily  chlorhexidine 0.12% Liquid 15 milliLiter(s) Swish and Spit two times a day  dexmedetomidine Infusion 0.2 MICROgram(s)/kG/Hr (5.215 mL/Hr) IV Continuous <Continuous>  diltiazem    Tablet 60 milliGRAM(s) Oral every 6 hours  enoxaparin Injectable 40 milliGRAM(s) SubCutaneous daily  folic acid 1 milliGRAM(s) Oral daily  influenza   Vaccine 0.5 milliLiter(s) IntraMuscular once  lisinopril 5 milliGRAM(s) Oral daily  metoprolol     tartrate 50 milliGRAM(s) Oral every 8 hours  multivitamin 1 Tablet(s) Oral daily  nicotine - 21 mG/24Hr(s) Patch 1 patch Transdermal daily  OLANZapine 2.5 milliGRAM(s) Oral daily  OLANZapine 5 milliGRAM(s) Oral at bedtime  pantoprazole  Injectable 40 milliGRAM(s) IV Push daily  tamsulosin 0.4 milliGRAM(s) Oral at bedtime    MEDICATIONS  (PRN):  acetaminophen    Suspension 650 milliGRAM(s) Oral every 6 hours PRN For Temp greater than 38 C (100.4 F)  ALBUTerol    0.083% 2.5 milliGRAM(s) Nebulizer every 4 hours PRN Shortness of Breath and/or Wheezing  bisacodyl Suppository 10 milliGRAM(s) Rectal daily PRN Constipation  haloperidol    Injectable 2 milliGRAM(s) IV Push every 6 hours PRN agitation  lactulose Syrup 10 Gram(s) Oral every 12 hours PRN No bowel Movements for 24 hours  metoprolol    tartrate Injectable 5 milliGRAM(s) IV Push every 6 hours PRN HR > 120  ondansetron Injectable 8 milliGRAM(s) IV Push every 6 hours PRN Vomiting      Allergies  Ceclor (Rash)    Vital Signs Last 24 Hrs  T(C): 37.8 (20 Nov 2017 11:35), Max: 37.8 (20 Nov 2017 11:35)  T(F): 100.1 (20 Nov 2017 11:35), Max: 100.1 (20 Nov 2017 11:35)  HR: 127 (20 Nov 2017 12:00) (61 - 127)  BP: 127/81 (20 Nov 2017 12:00) (96/59 - 134/69)  BP(mean): 100 (20 Nov 2017 12:00) (72 - 100)  RR: 38 (20 Nov 2017 12:00) (10 - 45)  SpO2: 99% (20 Nov 2017 12:00) (91% - 99%)    PHYSICAL EXAM:    GENERAL: NAD, well-groomed, well-developed  HEAD:  Atraumatic, Normocephalic  EYES: EOMI, PERRLA, conjunctiva and sclera clear  ENMT: No tonsillar erythema, exudates, or enlargement; Moist mucous membranes, Good dentition, No lesions  NECK: Supple, No JVD, Normal thyroid  CHEST/LUNG: Clear to percussion bilaterally; No rales, rhonchi, wheezing, or rubs  HEART: Regular rate and rhythm; No murmurs, rubs, or gallops  ABDOMEN: Soft, Nontender, Nondistended; Bowel sounds present  EXTREMITIES:  2+ Peripheral Pulses, No clubbing, cyanosis, or edema  LYMPH: No lymphadenopathy noted  SKIN: No rashes or lesions      LABS:                        9.5    5.5   )-----------( 201      ( 20 Nov 2017 06:01 )             30.0     11-20    142  |  104  |  18.0  ----------------------------<  118<H>  4.5   |  28.0  |  0.53    Ca    8.8      20 Nov 2017 06:01  Phos  3.4     11-20  Mg     1.8     11-20      Pt / INR were normal last week.           RADIOLOGY & ADDITIONAL STUDIES:

## 2017-11-20 NOTE — PROGRESS NOTE ADULT - SUBJECTIVE AND OBJECTIVE BOX
Patient is a 62y old  Male who presents with a chief complaint of Shortness of breath (30 Oct 2017 15:03)      BRIEF HOSPITAL COURSE: 63 y/o M with a h/o COPD, a-fib (no a/c), CHF, alcoholic cirrhosis, EtOH abuse, EtOH withdrawal (intubated in past for airway protection), initially admitted on 10/28 for COPD exacerbation and alcohol intoxication, signed out AMA and was found later on in hospital disoriented. Readmitted and RRT called later on when patient became tremulous, agitated, delirious, and began hallucinating. Transferred to MICU for further management with benzos and precedex gtt. Patient became obtunded, lost ability to protect airway, and was subsequently intubated. As per report, patient is highly sensitive to benzodiazepines.  CT head neg for acute events. Course complicated by a-fib with RVR, sepsis, pseudomonas aspiration pneumonia. Self extubated on 11/2 without need for reintubation initially but on the morning of 11/4 patient obtunded  with difficulty managing secretions. Reintubated for airway protection. Pt has had recurrent ruptured  balloons and ETT breakage.  He had failed his weaning trials and had a tracheostomy placed on Thurs. 11/9.  Brought back to the ICU secondary to for fever and poorly controlled AF     Events last 24 hours: mucous plugging went into afib improved with suctioning    PAST MEDICAL & SURGICAL HISTORY:  Falls  Meningitis  Collapsed lung  Alcohol withdrawal  Emphysema of lung  ETOH abuse  Cirrhosis  Afib  CHF (congestive heart failure)  Poor historian  Alcohol abuse  Chronic atrial fibrillation  S/P BKA (below knee amputation), left: secondary to worsening infection in lower leg. Had both ankle injuries from fall s/p repair - left had complication.  S/P BKA (below knee amputation) unilateral, left      Review of Systems:  unable to obtain      Medications:    diltiazem    Tablet 60 milliGRAM(s) Oral every 6 hours  lisinopril 5 milliGRAM(s) Oral daily  metoprolol     tartrate 50 milliGRAM(s) Oral every 8 hours  metoprolol    tartrate Injectable 5 milliGRAM(s) IV Push every 6 hours PRN  tamsulosin 0.4 milliGRAM(s) Oral at bedtime    acetylcysteine 20% Inhalation 1 milliLiter(s) Inhalation every 8 hours  ALBUTerol    0.083% 2.5 milliGRAM(s) Nebulizer every 6 hours  ALBUTerol/ipratropium for Nebulization 3 milliLiter(s) Nebulizer every 6 hours    acetaminophen    Suspension 650 milliGRAM(s) Oral every 6 hours PRN  dexmedetomidine Infusion 0.2 MICROgram(s)/kG/Hr IV Continuous <Continuous>  haloperidol    Injectable 2 milliGRAM(s) IV Push every 6 hours PRN  OLANZapine 2.5 milliGRAM(s) Oral daily  OLANZapine 5 milliGRAM(s) Oral at bedtime  ondansetron Injectable 8 milliGRAM(s) IV Push every 6 hours PRN      aspirin  chewable 81 milliGRAM(s) Oral daily  enoxaparin Injectable 40 milliGRAM(s) SubCutaneous daily    bisacodyl Suppository 10 milliGRAM(s) Rectal daily PRN  lactulose Syrup 10 Gram(s) Oral every 12 hours PRN  pantoprazole  Injectable 40 milliGRAM(s) IV Push daily        folic acid 1 milliGRAM(s) Oral daily  multivitamin 1 Tablet(s) Oral daily    influenza   Vaccine 0.5 milliLiter(s) IntraMuscular once    chlorhexidine 0.12% Liquid 15 milliLiter(s) Swish and Spit two times a day    nicotine - 21 mG/24Hr(s) Patch 1 patch Transdermal daily      Mode: AC/ CMV (Assist Control/ Continuous Mandatory Ventilation)  RR (machine): 12  TV (machine): 480  FiO2: 30  PEEP: 5  MAP: 13  PIP: 31      ICU Vital Signs Last 24 Hrs  T(C): 37.8 (20 Nov 2017 16:00), Max: 37.8 (20 Nov 2017 11:35)  T(F): 100 (20 Nov 2017 16:00), Max: 100.1 (20 Nov 2017 11:35)  HR: 98 (20 Nov 2017 16:00) (61 - 161)  BP: 117/68 (20 Nov 2017 16:00) (96/59 - 141/63)  BP(mean): 85 (20 Nov 2017 16:00) (72 - 100)  ABP: --  ABP(mean): --  RR: 37 (20 Nov 2017 16:00) (10 - 45)  SpO2: 98% (20 Nov 2017 16:00) (91% - 99%)          I&O's Detail    19 Nov 2017 07:01  -  20 Nov 2017 07:00  --------------------------------------------------------  IN:    dexmedetomidine Infusion: 215 mL    Enteral Tube Flush: 100 mL    Free Water: 1000 mL    Glucerna: 1320 mL  Total IN: 2635 mL    OUT:    Incontinent per Condom Catheter: 250 mL  Total OUT: 250 mL    Total NET: 2385 mL      20 Nov 2017 07:01  -  20 Nov 2017 17:15  --------------------------------------------------------  IN:    Enteral Tube Flush: 80 mL    Free Water: 250 mL    Glucerna: 540 mL    Solution: 50 mL  Total IN: 920 mL    OUT:    Incontinent per Condom Catheter: 450 mL    Stool: 3 mL  Total OUT: 453 mL    Total NET: 467 mL            LABS:                        9.5    5.5   )-----------( 201      ( 20 Nov 2017 06:01 )             30.0     11-20    142  |  104  |  18.0  ----------------------------<  118<H>  4.5   |  28.0  |  0.53    Ca    8.8      20 Nov 2017 06:01  Phos  3.4     11-20  Mg     1.8     11-20            CAPILLARY BLOOD GLUCOSE            CULTURES:  Culture Results:   Numerous Pseudomonas aeruginosa  Moderate Routine respiratory raymundo present (11-18-17 @ 15:12)  Culture Results:   No growth at 48 hours (11-16-17 @ 15:37)  Culture Results:   No growth at 48 hours (11-16-17 @ 15:37)      Physical Examination:    General: No acute distress.  Alert, appropriate at times     HEENT: Pupils equal, reactive to light.  Symmetric.    PULM: diminished    CVS: Regular rate and rhythm, no murmurs, rubs, or gallops    ABD: Soft, nondistended, nontender, normoactive bowel sounds, no masses    EXT: No edema, nontender, left BKA    SKIN: Warm and well perfused, no rashes noted.

## 2017-11-21 LAB
ANION GAP SERPL CALC-SCNC: 8 MMOL/L — SIGNIFICANT CHANGE UP (ref 5–17)
APTT BLD: 29.3 SEC — SIGNIFICANT CHANGE UP (ref 27.5–37.4)
BUN SERPL-MCNC: 16 MG/DL — SIGNIFICANT CHANGE UP (ref 8–20)
CALCIUM SERPL-MCNC: 9 MG/DL — SIGNIFICANT CHANGE UP (ref 8.6–10.2)
CHLORIDE SERPL-SCNC: 105 MMOL/L — SIGNIFICANT CHANGE UP (ref 98–107)
CO2 SERPL-SCNC: 28 MMOL/L — SIGNIFICANT CHANGE UP (ref 22–29)
CREAT SERPL-MCNC: 0.5 MG/DL — SIGNIFICANT CHANGE UP (ref 0.5–1.3)
GLUCOSE SERPL-MCNC: 106 MG/DL — SIGNIFICANT CHANGE UP (ref 70–115)
HCT VFR BLD CALC: 32.6 % — LOW (ref 42–52)
HGB BLD-MCNC: 10.4 G/DL — LOW (ref 14–18)
INR BLD: 1.21 RATIO — HIGH (ref 0.88–1.16)
MAGNESIUM SERPL-MCNC: 1.9 MG/DL — SIGNIFICANT CHANGE UP (ref 1.6–2.6)
MCHC RBC-ENTMCNC: 29.4 PG — SIGNIFICANT CHANGE UP (ref 27–31)
MCHC RBC-ENTMCNC: 31.9 G/DL — LOW (ref 32–36)
MCV RBC AUTO: 92.1 FL — SIGNIFICANT CHANGE UP (ref 80–94)
PHOSPHATE SERPL-MCNC: 3.9 MG/DL — SIGNIFICANT CHANGE UP (ref 2.4–4.7)
PLATELET # BLD AUTO: 229 K/UL — SIGNIFICANT CHANGE UP (ref 150–400)
POTASSIUM SERPL-MCNC: 4.6 MMOL/L — SIGNIFICANT CHANGE UP (ref 3.5–5.3)
POTASSIUM SERPL-SCNC: 4.6 MMOL/L — SIGNIFICANT CHANGE UP (ref 3.5–5.3)
PROTHROM AB SERPL-ACNC: 13.4 SEC — HIGH (ref 9.8–12.7)
RBC # BLD: 3.54 M/UL — LOW (ref 4.6–6.2)
RBC # FLD: 16.2 % — HIGH (ref 11–15.6)
SODIUM SERPL-SCNC: 141 MMOL/L — SIGNIFICANT CHANGE UP (ref 135–145)
WBC # BLD: 6.4 K/UL — SIGNIFICANT CHANGE UP (ref 4.8–10.8)
WBC # FLD AUTO: 6.4 K/UL — SIGNIFICANT CHANGE UP (ref 4.8–10.8)

## 2017-11-21 PROCEDURE — 99232 SBSQ HOSP IP/OBS MODERATE 35: CPT

## 2017-11-21 PROCEDURE — 99233 SBSQ HOSP IP/OBS HIGH 50: CPT

## 2017-11-21 RX ADMIN — Medication 3 MILLILITER(S): at 15:15

## 2017-11-21 RX ADMIN — Medication 50 MILLIGRAM(S): at 21:47

## 2017-11-21 RX ADMIN — DEXMEDETOMIDINE HYDROCHLORIDE IN 0.9% SODIUM CHLORIDE 5.21 MICROGRAM(S)/KG/HR: 4 INJECTION INTRAVENOUS at 18:51

## 2017-11-21 RX ADMIN — OLANZAPINE 2.5 MILLIGRAM(S): 15 TABLET, FILM COATED ORAL at 13:02

## 2017-11-21 RX ADMIN — Medication 3 MILLILITER(S): at 09:10

## 2017-11-21 RX ADMIN — HALOPERIDOL DECANOATE 2 MILLIGRAM(S): 100 INJECTION INTRAMUSCULAR at 10:47

## 2017-11-21 RX ADMIN — Medication 1 MILLILITER(S): at 04:12

## 2017-11-21 RX ADMIN — Medication 1 TABLET(S): at 13:06

## 2017-11-21 RX ADMIN — PANTOPRAZOLE SODIUM 40 MILLIGRAM(S): 20 TABLET, DELAYED RELEASE ORAL at 13:05

## 2017-11-21 RX ADMIN — TAMSULOSIN HYDROCHLORIDE 0.4 MILLIGRAM(S): 0.4 CAPSULE ORAL at 21:47

## 2017-11-21 RX ADMIN — Medication 1 PATCH: at 15:24

## 2017-11-21 RX ADMIN — Medication 50 MILLIGRAM(S): at 15:24

## 2017-11-21 RX ADMIN — Medication 3 MILLILITER(S): at 04:09

## 2017-11-21 RX ADMIN — HALOPERIDOL DECANOATE 2 MILLIGRAM(S): 100 INJECTION INTRAMUSCULAR at 19:42

## 2017-11-21 RX ADMIN — Medication 1 PATCH: at 15:22

## 2017-11-21 RX ADMIN — OLANZAPINE 5 MILLIGRAM(S): 15 TABLET, FILM COATED ORAL at 21:47

## 2017-11-21 RX ADMIN — Medication 3 MILLILITER(S): at 20:51

## 2017-11-21 RX ADMIN — CHLORHEXIDINE GLUCONATE 15 MILLILITER(S): 213 SOLUTION TOPICAL at 18:37

## 2017-11-21 RX ADMIN — CHLORHEXIDINE GLUCONATE 15 MILLILITER(S): 213 SOLUTION TOPICAL at 05:41

## 2017-11-21 RX ADMIN — Medication 1 MILLIGRAM(S): at 13:05

## 2017-11-21 NOTE — PROGRESS NOTE ADULT - SUBJECTIVE AND OBJECTIVE BOX
INTERVAL HPI/OVERNIGHT EVENTS:    MEDICATIONS  (STANDING):  ALBUTerol/ipratropium for Nebulization 3 milliLiter(s) Nebulizer every 6 hours  chlorhexidine 0.12% Liquid 15 milliLiter(s) Swish and Spit two times a day  dexmedetomidine Infusion 0.2 MICROgram(s)/kG/Hr (5.215 mL/Hr) IV Continuous <Continuous>  diltiazem    Tablet 60 milliGRAM(s) Oral every 6 hours  folic acid 1 milliGRAM(s) Oral daily  influenza   Vaccine 0.5 milliLiter(s) IntraMuscular once  lisinopril 5 milliGRAM(s) Oral daily  metoprolol     tartrate 50 milliGRAM(s) Oral every 8 hours  multivitamin 1 Tablet(s) Oral daily  nicotine - 21 mG/24Hr(s) Patch 1 patch Transdermal daily  OLANZapine 2.5 milliGRAM(s) Oral daily  OLANZapine 5 milliGRAM(s) Oral at bedtime  pantoprazole  Injectable 40 milliGRAM(s) IV Push daily  tamsulosin 0.4 milliGRAM(s) Oral at bedtime    MEDICATIONS  (PRN):  acetaminophen    Suspension 650 milliGRAM(s) Oral every 6 hours PRN For Temp greater than 38 C (100.4 F)  bisacodyl Suppository 10 milliGRAM(s) Rectal daily PRN Constipation  haloperidol    Injectable 2 milliGRAM(s) IV Push every 6 hours PRN agitation  lactulose Syrup 10 Gram(s) Oral every 12 hours PRN No bowel Movements for 24 hours  metoprolol    tartrate Injectable 5 milliGRAM(s) IV Push every 6 hours PRN HR > 120      Allergies    Ceclor (Rash)    Intolerances        Vital Signs Last 24 Hrs  T(C): 37.3 (21 Nov 2017 07:50), Max: 37.8 (20 Nov 2017 11:35)  T(F): 99.2 (21 Nov 2017 07:50), Max: 100.1 (20 Nov 2017 11:35)  HR: 71 (21 Nov 2017 10:00) (60 - 161)  BP: 110/69 (21 Nov 2017 10:00) (94/58 - 141/63)  BP(mean): 84 (21 Nov 2017 10:00) (71 - 100)  RR: 19 (21 Nov 2017 10:00) (6 - 41)  SpO2: 96% (21 Nov 2017 10:00) (90% - 99%)    LABS:                        9.5    5.5   )-----------( 201      ( 20 Nov 2017 06:01 )             30.0     11-21    141  |  105  |  16.0  ----------------------------<  106  4.6   |  28.0  |  0.50    Ca    9.0      21 Nov 2017 06:24  Phos  3.9     11-21  Mg     1.9     11-21            RADIOLOGY & ADDITIONAL TESTS: INTERVAL HPI/OVERNIGHT EVENTS:Patient seen at bed side. He is still on restrains. PEG tube evaluation for chronic vent dependent respiratory failure. He is receiving the NG tube feeding.     MEDICATIONS  (STANDING):  ALBUTerol/ipratropium for Nebulization 3 milliLiter(s) Nebulizer every 6 hours  chlorhexidine 0.12% Liquid 15 milliLiter(s) Swish and Spit two times a day  dexmedetomidine Infusion 0.2 MICROgram(s)/kG/Hr (5.215 mL/Hr) IV Continuous <Continuous>  diltiazem    Tablet 60 milliGRAM(s) Oral every 6 hours  folic acid 1 milliGRAM(s) Oral daily  influenza   Vaccine 0.5 milliLiter(s) IntraMuscular once  lisinopril 5 milliGRAM(s) Oral daily  metoprolol     tartrate 50 milliGRAM(s) Oral every 8 hours  multivitamin 1 Tablet(s) Oral daily  nicotine - 21 mG/24Hr(s) Patch 1 patch Transdermal daily  OLANZapine 2.5 milliGRAM(s) Oral daily  OLANZapine 5 milliGRAM(s) Oral at bedtime  pantoprazole  Injectable 40 milliGRAM(s) IV Push daily  tamsulosin 0.4 milliGRAM(s) Oral at bedtime    MEDICATIONS  (PRN):  acetaminophen    Suspension 650 milliGRAM(s) Oral every 6 hours PRN For Temp greater than 38 C (100.4 F)  bisacodyl Suppository 10 milliGRAM(s) Rectal daily PRN Constipation  haloperidol    Injectable 2 milliGRAM(s) IV Push every 6 hours PRN agitation  lactulose Syrup 10 Gram(s) Oral every 12 hours PRN No bowel Movements for 24 hours  metoprolol    tartrate Injectable 5 milliGRAM(s) IV Push every 6 hours PRN HR > 120      Allergies    Ceclor (Rash)    Intolerances        Vital Signs Last 24 Hrs  T(C): 37.3 (21 Nov 2017 07:50), Max: 37.8 (20 Nov 2017 11:35)  T(F): 99.2 (21 Nov 2017 07:50), Max: 100.1 (20 Nov 2017 11:35)  HR: 71 (21 Nov 2017 10:00) (60 - 161)  BP: 110/69 (21 Nov 2017 10:00) (94/58 - 141/63)  BP(mean): 84 (21 Nov 2017 10:00) (71 - 100)  RR: 19 (21 Nov 2017 10:00) (6 - 41)  SpO2: 96% (21 Nov 2017 10:00) (90% - 99%)    LABS:                        9.5    5.5   )-----------( 201      ( 20 Nov 2017 06:01 )             30.0     11-21    141  |  105  |  16.0  ----------------------------<  106  4.6   |  28.0  |  0.50    Ca    9.0      21 Nov 2017 06:24  Phos  3.9     11-21  Mg     1.9     11-21      Prothrombin Time and INR, Plasma in AM (11.21.17 @ 11:34)    Prothrombin Time, Plasma: 13.4: Effective March 21st, the reference range for PT has changed. sec    INR: 1.21: RECOMMENDED RANGES FOR THERAPEUTIC INR:    2.0-3.0 for most medical and surgical thromboembolic states    2.0-3.0 for atrial fibrillation    2.0-3.0 for bileaflet mechanical valve in aortic position    2.5-3.5 for mechanical heart valves   Chest 2004;126:[id number]  The presence of direct thrombin inhibitors (argatroban, refludan)  may falsely increase results. ratio          RADIOLOGY & ADDITIONAL TESTS:

## 2017-11-21 NOTE — PROGRESS NOTE ADULT - ASSESSMENT
Patient with vent dependent resp failure, alcohol abuse, and PEG tube placement request    1. Ancef 1 gram preop  2. NPO past midnight  3. Will call the daughter for telephonic consent

## 2017-11-21 NOTE — PROGRESS NOTE ADULT - SUBJECTIVE AND OBJECTIVE BOX
Patient is a 62y old  Male who presents with a chief complaint of Shortness of breath.  The patient is scheduled to have a PEG Placement while in the [facility]   (30 Oct 2017 15:03)      PAST MEDICAL HISTORY:  Falls  Meningitis  Collapsed lung  Alcohol withdrawal  Emphysema of lung  ETOH abuse  Cirrhosis  Afib  CHF (congestive heart failure)  Poor historian  Alcohol abuse  Chronic atrial fibrillation      PAST SURGICAL HISTORY:  S/P BKA (below knee amputation), left  Poor historian  S/P BKA (below knee amputation) unilateral, left      MEDICATIONS  (STANDING):  ALBUTerol/ipratropium for Nebulization 3 milliLiter(s) Nebulizer every 6 hours  chlorhexidine 0.12% Liquid 15 milliLiter(s) Swish and Spit two times a day  clindamycin IVPB 600 milliGRAM(s) IV Intermittent once  dexmedetomidine Infusion 0.2 MICROgram(s)/kG/Hr (5.215 mL/Hr) IV Continuous <Continuous>  diltiazem    Tablet 60 milliGRAM(s) Oral every 6 hours  folic acid 1 milliGRAM(s) Oral daily  influenza   Vaccine 0.5 milliLiter(s) IntraMuscular once  lisinopril 5 milliGRAM(s) Oral daily  metoprolol     tartrate 50 milliGRAM(s) Oral every 8 hours  multivitamin 1 Tablet(s) Oral daily  nicotine - 21 mG/24Hr(s) Patch 1 patch Transdermal daily  OLANZapine 2.5 milliGRAM(s) Oral daily  OLANZapine 5 milliGRAM(s) Oral at bedtime  pantoprazole  Injectable 40 milliGRAM(s) IV Push daily  tamsulosin 0.4 milliGRAM(s) Oral at bedtime    MEDICATIONS  (PRN):  acetaminophen    Suspension 650 milliGRAM(s) Oral every 6 hours PRN For Temp greater than 38 C (100.4 F)  bisacodyl Suppository 10 milliGRAM(s) Rectal daily PRN Constipation  haloperidol    Injectable 2 milliGRAM(s) IV Push every 6 hours PRN agitation  lactulose Syrup 10 Gram(s) Oral every 12 hours PRN No bowel Movements for 24 hours  metoprolol    tartrate Injectable 5 milliGRAM(s) IV Push every 6 hours PRN HR > 120    Allergies:     Ceclor (Rash)    SOCIAL HISTORY:     Unknown                        10.4   6.4   )-----------( 229      ( 2017 11:34 )             32.6   PT/INR - ( 2017 11:34 )   PT: 13.4 sec;   INR: 1.21 ratio        PTT - ( 2017 11:34 )  PTT:29.3 sec        141  |  105  |  16.0  ----------------------------<  106  4.6   |  28.0  |  0.50    Ca    9.0      2017 06:24  Phos  3.9       Mg     1.9         [id number]   Atrial fibrillation with rapid ventricular response  Nonspecific ST abnormality  Abnormal ECG    TTE  2017   Summary:   1. Severely increased left ventricular internal cavity size.   2. Normal global left ventricular systolic function. Left ventricular   ejection fraction, by visual estimation, is 55 to 60%.   3. Severely dilated right atrium.   4. Severely enlarged left atrium.   5. There is anterior mitral leaflet prolapse. There is moderate to   severe posteriorly directed MR.   6. There is no evidence of pericardial effusion.    CHEST X-RAY   2017  Nasogastric tube, tracheostomy cannula in position. No change heart   mediastinum.  There is vascular congestion diffuse bilateral interstitial edema. No   gross focal infiltrate. Possible small left pleural effusion.    ASA # = 4  Mallampati # = trached

## 2017-11-21 NOTE — PROGRESS NOTE ADULT - ASSESSMENT
62 yom etoh abuse with encephalopathy, trached for respiratory failure now with chronic respiratory failure, encephalopathy improving plan for peg in am    Rass - 0 to +1  Cam - pos  VAP bundle - yes  GI ppx - yes  DVT ppx - yes, hold for procedure  Nutrition - TF, hold after midnight    Invasive Devices: trach    Advanced directives: full code

## 2017-11-21 NOTE — PROGRESS NOTE ADULT - SUBJECTIVE AND OBJECTIVE BOX
Patient is a 62y old  Male who presents with a chief complaint of Shortness of breath (30 Oct 2017 15:03)      BRIEF HOSPITAL COURSE: 61 y/o M with a h/o COPD, a-fib (no a/c), CHF, alcoholic cirrhosis, EtOH abuse, EtOH withdrawal (intubated in past for airway protection), initially admitted on 10/28 for COPD exacerbation and alcohol intoxication, signed out AMA and was found later on in hospital disoriented. Readmitted and RRT called later on when patient became tremulous, agitated, delirious, and began hallucinating. Transferred to MICU for further management with benzos and precedex gtt. Patient became obtunded, lost ability to protect airway, and was subsequently intubated. As per report, patient is highly sensitive to benzodiazepines.  CT head neg for acute events. Course complicated by a-fib with RVR, sepsis, pseudomonas aspiration pneumonia. Self extubated on 11/2 without need for reintubation initially but on the morning of 11/4 patient obtunded  with difficulty managing secretions. Reintubated for airway protection. Pt has had recurrent ruptured  balloons and ETT breakage.  He had failed his weaning trials and had a tracheostomy placed on Thurs. 11/9.  Brought back to the ICU secondary to for fever and poorly controlled AF     Events last 24 hours: doing better MS improved, OOB to chair    PAST MEDICAL & SURGICAL HISTORY:  Falls  Meningitis  Collapsed lung  Alcohol withdrawal  Emphysema of lung  ETOH abuse  Cirrhosis  Afib  CHF (congestive heart failure)  Poor historian  Alcohol abuse  Chronic atrial fibrillation  S/P BKA (below knee amputation), left: secondary to worsening infection in lower leg. Had both ankle injuries from fall s/p repair - left had complication.  S/P BKA (below knee amputation) unilateral, left      Review of Systems:  limited      Medications:  clindamycin IVPB 600 milliGRAM(s) IV Intermittent once    diltiazem    Tablet 60 milliGRAM(s) Oral every 6 hours  lisinopril 5 milliGRAM(s) Oral daily  metoprolol     tartrate 50 milliGRAM(s) Oral every 8 hours  metoprolol    tartrate Injectable 5 milliGRAM(s) IV Push every 6 hours PRN  tamsulosin 0.4 milliGRAM(s) Oral at bedtime    ALBUTerol/ipratropium for Nebulization 3 milliLiter(s) Nebulizer every 6 hours    acetaminophen    Suspension 650 milliGRAM(s) Oral every 6 hours PRN  dexmedetomidine Infusion 0.2 MICROgram(s)/kG/Hr IV Continuous <Continuous>  haloperidol    Injectable 2 milliGRAM(s) IV Push every 6 hours PRN  OLANZapine 2.5 milliGRAM(s) Oral daily  OLANZapine 5 milliGRAM(s) Oral at bedtime        bisacodyl Suppository 10 milliGRAM(s) Rectal daily PRN  lactulose Syrup 10 Gram(s) Oral every 12 hours PRN  pantoprazole  Injectable 40 milliGRAM(s) IV Push daily        folic acid 1 milliGRAM(s) Oral daily  multivitamin 1 Tablet(s) Oral daily    influenza   Vaccine 0.5 milliLiter(s) IntraMuscular once    chlorhexidine 0.12% Liquid 15 milliLiter(s) Swish and Spit two times a day    nicotine - 21 mG/24Hr(s) Patch 1 patch Transdermal daily      Mode: AC/ CMV (Assist Control/ Continuous Mandatory Ventilation)  RR (machine): 12  TV (machine): 480  FiO2: 30  PEEP: 5  MAP: 12  PIP: 27      ICU Vital Signs Last 24 Hrs  T(C): 37.8 (21 Nov 2017 20:31), Max: 37.8 (21 Nov 2017 20:00)  T(F): 100.1 (21 Nov 2017 20:31), Max: 100.1 (21 Nov 2017 20:00)  HR: 95 (21 Nov 2017 22:00) (60 - 111)  BP: 126/78 (21 Nov 2017 22:00) (94/58 - 143/74)  BP(mean): 96 (21 Nov 2017 22:00) (71 - 101)  ABP: --  ABP(mean): --  RR: 34 (21 Nov 2017 22:00) (6 - 38)  SpO2: 96% (21 Nov 2017 22:00) (92% - 100%)          I&O's Detail    20 Nov 2017 07:01  -  21 Nov 2017 07:00  --------------------------------------------------------  IN:    dexmedetomidine Infusion: 114.4 mL    Enteral Tube Flush: 80 mL    Free Water: 750 mL    Glucerna: 960 mL    Solution: 50 mL  Total IN: 1954.4 mL    OUT:    Incontinent per Condom Catheter: 1325 mL    Stool: 3 mL  Total OUT: 1328 mL    Total NET: 626.4 mL      21 Nov 2017 07:01  -  21 Nov 2017 22:46  --------------------------------------------------------  IN:    dexmedetomidine Infusion: 117 mL    Enteral Tube Flush: 410 mL    Free Water: 500 mL    Glucerna: 720 mL  Total IN: 1747 mL    OUT:    Incontinent per Condom Catheter: 300 mL  Total OUT: 300 mL    Total NET: 1447 mL            LABS:                        10.4   6.4   )-----------( 229      ( 21 Nov 2017 11:34 )             32.6     11-21    141  |  105  |  16.0  ----------------------------<  106  4.6   |  28.0  |  0.50    Ca    9.0      21 Nov 2017 06:24  Phos  3.9     11-21  Mg     1.9     11-21            CAPILLARY BLOOD GLUCOSE        PT/INR - ( 21 Nov 2017 11:34 )   PT: 13.4 sec;   INR: 1.21 ratio         PTT - ( 21 Nov 2017 11:34 )  PTT:29.3 sec    CULTURES:  Culture Results:   Numerous Pseudomonas aeruginosa  Moderate Routine respiratory raymundo present (11-18-17 @ 15:12)  Culture Results:   No growth at 48 hours (11-16-17 @ 15:37)  Culture Results:   No growth at 48 hours (11-16-17 @ 15:37)      Physical Examination:    General: No acute distress.  Alert    HEENT: Pupils equal, reactive to light.  Symmetric.    PULM: rhoncus auscultation bilaterally, moderate sputum production    CVS: Regular rate and rhythm, no murmurs, rubs, or gallops    ABD: Soft, nondistended, nontender, normoactive bowel sounds, no masses    EXT: No edema, nontender    SKIN: Warm and well perfused, no rashes noted.

## 2017-11-22 DIAGNOSIS — J98.9 RESPIRATORY DISORDER, UNSPECIFIED: ICD-10-CM

## 2017-11-22 LAB
ANION GAP SERPL CALC-SCNC: 13 MMOL/L — SIGNIFICANT CHANGE UP (ref 5–17)
BUN SERPL-MCNC: 14 MG/DL — SIGNIFICANT CHANGE UP (ref 8–20)
CALCIUM SERPL-MCNC: 9.4 MG/DL — SIGNIFICANT CHANGE UP (ref 8.6–10.2)
CHLORIDE SERPL-SCNC: 101 MMOL/L — SIGNIFICANT CHANGE UP (ref 98–107)
CO2 SERPL-SCNC: 26 MMOL/L — SIGNIFICANT CHANGE UP (ref 22–29)
CREAT SERPL-MCNC: 0.58 MG/DL — SIGNIFICANT CHANGE UP (ref 0.5–1.3)
CULTURE RESULTS: SIGNIFICANT CHANGE UP
CULTURE RESULTS: SIGNIFICANT CHANGE UP
GLUCOSE BLDC GLUCOMTR-MCNC: 99 MG/DL — SIGNIFICANT CHANGE UP (ref 70–99)
GLUCOSE SERPL-MCNC: 100 MG/DL — SIGNIFICANT CHANGE UP (ref 70–115)
HCT VFR BLD CALC: 31 % — LOW (ref 42–52)
HGB BLD-MCNC: 10 G/DL — LOW (ref 14–18)
INR BLD: 1.22 RATIO — HIGH (ref 0.88–1.16)
MAGNESIUM SERPL-MCNC: 1.8 MG/DL — SIGNIFICANT CHANGE UP (ref 1.6–2.6)
MCHC RBC-ENTMCNC: 29.2 PG — SIGNIFICANT CHANGE UP (ref 27–31)
MCHC RBC-ENTMCNC: 32.3 G/DL — SIGNIFICANT CHANGE UP (ref 32–36)
MCV RBC AUTO: 90.6 FL — SIGNIFICANT CHANGE UP (ref 80–94)
PHOSPHATE SERPL-MCNC: 3.6 MG/DL — SIGNIFICANT CHANGE UP (ref 2.4–4.7)
PLATELET # BLD AUTO: 205 K/UL — SIGNIFICANT CHANGE UP (ref 150–400)
POTASSIUM SERPL-MCNC: 4.4 MMOL/L — SIGNIFICANT CHANGE UP (ref 3.5–5.3)
POTASSIUM SERPL-SCNC: 4.4 MMOL/L — SIGNIFICANT CHANGE UP (ref 3.5–5.3)
PROTHROM AB SERPL-ACNC: 13.5 SEC — HIGH (ref 9.8–12.7)
RBC # BLD: 3.42 M/UL — LOW (ref 4.6–6.2)
RBC # FLD: 16 % — HIGH (ref 11–15.6)
SODIUM SERPL-SCNC: 140 MMOL/L — SIGNIFICANT CHANGE UP (ref 135–145)
SPECIMEN SOURCE: SIGNIFICANT CHANGE UP
SPECIMEN SOURCE: SIGNIFICANT CHANGE UP
WBC # BLD: 6.2 K/UL — SIGNIFICANT CHANGE UP (ref 4.8–10.8)
WBC # FLD AUTO: 6.2 K/UL — SIGNIFICANT CHANGE UP (ref 4.8–10.8)

## 2017-11-22 PROCEDURE — 99233 SBSQ HOSP IP/OBS HIGH 50: CPT

## 2017-11-22 PROCEDURE — 43246 EGD PLACE GASTROSTOMY TUBE: CPT

## 2017-11-22 RX ORDER — FENTANYL CITRATE 50 UG/ML
25 INJECTION INTRAVENOUS EVERY 4 HOURS
Qty: 0 | Refills: 0 | Status: DISCONTINUED | OUTPATIENT
Start: 2017-11-22 | End: 2017-11-27

## 2017-11-22 RX ORDER — VECURONIUM BROMIDE 20 MG/1
10 INJECTION, POWDER, FOR SOLUTION INTRAVENOUS ONCE
Qty: 0 | Refills: 0 | Status: COMPLETED | OUTPATIENT
Start: 2017-11-22 | End: 2017-11-22

## 2017-11-22 RX ORDER — DIGOXIN 250 MCG
0.5 TABLET ORAL ONCE
Qty: 0 | Refills: 0 | Status: COMPLETED | OUTPATIENT
Start: 2017-11-22 | End: 2017-11-22

## 2017-11-22 RX ORDER — ENOXAPARIN SODIUM 100 MG/ML
40 INJECTION SUBCUTANEOUS EVERY 24 HOURS
Qty: 0 | Refills: 0 | Status: DISCONTINUED | OUTPATIENT
Start: 2017-11-22 | End: 2017-12-14

## 2017-11-22 RX ORDER — METOPROLOL TARTRATE 50 MG
5 TABLET ORAL ONCE
Qty: 0 | Refills: 0 | Status: COMPLETED | OUTPATIENT
Start: 2017-11-22 | End: 2017-11-22

## 2017-11-22 RX ORDER — FENTANYL CITRATE 50 UG/ML
50 INJECTION INTRAVENOUS ONCE
Qty: 0 | Refills: 0 | Status: DISCONTINUED | OUTPATIENT
Start: 2017-11-22 | End: 2017-11-22

## 2017-11-22 RX ORDER — PROPOFOL 10 MG/ML
10 INJECTION, EMULSION INTRAVENOUS
Qty: 1000 | Refills: 0 | Status: DISCONTINUED | OUTPATIENT
Start: 2017-11-22 | End: 2017-11-22

## 2017-11-22 RX ORDER — MAGNESIUM SULFATE 500 MG/ML
2 VIAL (ML) INJECTION ONCE
Qty: 0 | Refills: 0 | Status: COMPLETED | OUTPATIENT
Start: 2017-11-22 | End: 2017-11-22

## 2017-11-22 RX ADMIN — Medication 1 PATCH: at 12:27

## 2017-11-22 RX ADMIN — PANTOPRAZOLE SODIUM 40 MILLIGRAM(S): 20 TABLET, DELAYED RELEASE ORAL at 12:29

## 2017-11-22 RX ADMIN — TAMSULOSIN HYDROCHLORIDE 0.4 MILLIGRAM(S): 0.4 CAPSULE ORAL at 21:26

## 2017-11-22 RX ADMIN — Medication 1 TABLET(S): at 12:28

## 2017-11-22 RX ADMIN — OLANZAPINE 5 MILLIGRAM(S): 15 TABLET, FILM COATED ORAL at 21:26

## 2017-11-22 RX ADMIN — Medication 1 PATCH: at 12:28

## 2017-11-22 RX ADMIN — Medication 3 MILLILITER(S): at 15:39

## 2017-11-22 RX ADMIN — FENTANYL CITRATE 25 MICROGRAM(S): 50 INJECTION INTRAVENOUS at 10:35

## 2017-11-22 RX ADMIN — Medication 100 MILLIGRAM(S): at 06:55

## 2017-11-22 RX ADMIN — Medication 3 MILLILITER(S): at 20:25

## 2017-11-22 RX ADMIN — PROPOFOL 6.26 MICROGRAM(S)/KG/MIN: 10 INJECTION, EMULSION INTRAVENOUS at 08:50

## 2017-11-22 RX ADMIN — Medication 3 MILLILITER(S): at 03:23

## 2017-11-22 RX ADMIN — ENOXAPARIN SODIUM 40 MILLIGRAM(S): 100 INJECTION SUBCUTANEOUS at 12:35

## 2017-11-22 RX ADMIN — CHLORHEXIDINE GLUCONATE 15 MILLILITER(S): 213 SOLUTION TOPICAL at 06:03

## 2017-11-22 RX ADMIN — OLANZAPINE 2.5 MILLIGRAM(S): 15 TABLET, FILM COATED ORAL at 12:28

## 2017-11-22 RX ADMIN — CHLORHEXIDINE GLUCONATE 15 MILLILITER(S): 213 SOLUTION TOPICAL at 17:43

## 2017-11-22 RX ADMIN — Medication 1 MILLIGRAM(S): at 12:28

## 2017-11-22 RX ADMIN — FENTANYL CITRATE 50 MICROGRAM(S): 50 INJECTION INTRAVENOUS at 08:55

## 2017-11-22 RX ADMIN — Medication 3 MILLILITER(S): at 09:29

## 2017-11-22 RX ADMIN — Medication 50 MILLIGRAM(S): at 21:26

## 2017-11-22 RX ADMIN — VECURONIUM BROMIDE 10 MILLIGRAM(S): 20 INJECTION, POWDER, FOR SOLUTION INTRAVENOUS at 08:55

## 2017-11-22 RX ADMIN — Medication 50 MILLIGRAM(S): at 14:55

## 2017-11-22 RX ADMIN — Medication 0.5 MILLIGRAM(S): at 10:27

## 2017-11-22 RX ADMIN — Medication 5 MILLIGRAM(S): at 10:20

## 2017-11-22 RX ADMIN — FENTANYL CITRATE 50 MICROGRAM(S): 50 INJECTION INTRAVENOUS at 08:50

## 2017-11-22 RX ADMIN — DEXMEDETOMIDINE HYDROCHLORIDE IN 0.9% SODIUM CHLORIDE 5.21 MICROGRAM(S)/KG/HR: 4 INJECTION INTRAVENOUS at 02:42

## 2017-11-22 RX ADMIN — Medication 50 GRAM(S): at 12:35

## 2017-11-22 RX ADMIN — FENTANYL CITRATE 25 MICROGRAM(S): 50 INJECTION INTRAVENOUS at 10:30

## 2017-11-22 NOTE — PROGRESS NOTE ADULT - ASSESSMENT
62 yom etoh abuse with encephalopathy, trached for respiratory failure now with chronic respiratory failure, encephalopathy improving plan for peg in am    Rass - 0 to +1  Cam - pos  VAP bundle - yes  GI ppx - yes  DVT ppx - yes  Nutrition - hold until am    Invasive Devices: trach    Advanced directives: full code, daughter given MOLST today PA discussed plans of possible LTAC for further weaning will assess monday

## 2017-11-22 NOTE — PROCEDURE NOTE - PROCEDURE DATE TIME, MLM
07-Nov-2017 08:00
04-Nov-2017 08:30
04-Nov-2017 09:00
04-Nov-2017 13:40
31-Oct-2017 20:46
22-Nov-2017 08:14
31-Oct-2017 20:41

## 2017-11-22 NOTE — BRIEF OPERATIVE NOTE - PRE-OP DX
Respiratory failure  11/10/2017    Active  Gurjit Aragon
Oropharyngeal dysphagia  11/22/2017    Active  Emigdio Lewis

## 2017-11-22 NOTE — PROGRESS NOTE ADULT - SUBJECTIVE AND OBJECTIVE BOX
Patient is a 62y old  Male who presents with a chief complaint of Shortness of breath (30 Oct 2017 15:03)      BRIEF HOSPITAL COURSE:  61 y/o M with a h/o COPD, a-fib (no a/c), CHF, alcoholic cirrhosis, EtOH abuse, EtOH withdrawal (intubated in past for airway protection), initially admitted on 10/28 for COPD exacerbation and alcohol intoxication, signed out AMA and was found later on in hospital disoriented. Readmitted and RRT called later on when patient became tremulous, agitated, delirious, and began hallucinating. Transferred to MICU for further management with benzos and precedex gtt. Patient became obtunded, lost ability to protect airway, and was subsequently intubated. As per report, patient is highly sensitive to benzodiazepines.  CT head neg for acute events. Course complicated by a-fib with RVR, sepsis, pseudomonas aspiration pneumonia. Self extubated on 11/2 without need for reintubation initially but on the morning of 11/4 patient obtunded  with difficulty managing secretions. Reintubated for airway protection. Pt has had recurrent ruptured  balloons and ETT breakage.  He had failed his weaning trials and had a tracheostomy placed on Thurs. 11/9.  Brought back to the ICU secondary to for fever and poorly controlled AF     Events last 24 hours: s/p peg this am, periods of afib with RVR missed morning meds secondary to peg - improved with digoxin and cardizem    PAST MEDICAL & SURGICAL HISTORY:  Falls  Meningitis  Collapsed lung  Alcohol withdrawal  Emphysema of lung  ETOH abuse  Cirrhosis  Afib  CHF (congestive heart failure)  Poor historian  Alcohol abuse  Chronic atrial fibrillation  S/P BKA (below knee amputation), left: secondary to worsening infection in lower leg. Had both ankle injuries from fall s/p repair - left had complication.  S/P BKA (below knee amputation) unilateral, left      Review of Systems:  unable to obtain      Medications:    diltiazem    Tablet 60 milliGRAM(s) Oral every 6 hours  lisinopril 5 milliGRAM(s) Oral daily  metoprolol     tartrate 50 milliGRAM(s) Oral every 8 hours  metoprolol    tartrate Injectable 5 milliGRAM(s) IV Push every 6 hours PRN  tamsulosin 0.4 milliGRAM(s) Oral at bedtime    ALBUTerol/ipratropium for Nebulization 3 milliLiter(s) Nebulizer every 6 hours    acetaminophen    Suspension 650 milliGRAM(s) Oral every 6 hours PRN  fentaNYL    Injectable 25 MICROGram(s) IV Push every 4 hours PRN  haloperidol    Injectable 2 milliGRAM(s) IV Push every 6 hours PRN  OLANZapine 2.5 milliGRAM(s) Oral daily  OLANZapine 5 milliGRAM(s) Oral at bedtime      enoxaparin Injectable 40 milliGRAM(s) SubCutaneous every 24 hours    bisacodyl Suppository 10 milliGRAM(s) Rectal daily PRN  lactulose Syrup 10 Gram(s) Oral every 12 hours PRN  pantoprazole  Injectable 40 milliGRAM(s) IV Push daily        folic acid 1 milliGRAM(s) Oral daily  multivitamin 1 Tablet(s) Oral daily    influenza   Vaccine 0.5 milliLiter(s) IntraMuscular once    chlorhexidine 0.12% Liquid 15 milliLiter(s) Swish and Spit two times a day    nicotine - 21 mG/24Hr(s) Patch 1 patch Transdermal daily      Mode: AC/ CMV (Assist Control/ Continuous Mandatory Ventilation)  RR (machine): 14  TV (machine): 480  FiO2: 40  PEEP: 5  MAP: 11  PIP: 30      ICU Vital Signs Last 24 Hrs  T(C): 36.9 (22 Nov 2017 11:20), Max: 37.8 (21 Nov 2017 20:00)  T(F): 98.5 (22 Nov 2017 11:20), Max: 100.1 (21 Nov 2017 20:00)  HR: 118 (22 Nov 2017 13:00) (75 - 167)  BP: 106/56 (22 Nov 2017 13:00) (104/64 - 143/74)  BP(mean): 73 (22 Nov 2017 13:00) (73 - 101)  ABP: --  ABP(mean): --  RR: 36 (22 Nov 2017 13:00) (19 - 48)  SpO2: 98% (22 Nov 2017 13:00) (92% - 100%)          I&O's Detail    21 Nov 2017 07:01  -  22 Nov 2017 07:00  --------------------------------------------------------  IN:    dexmedetomidine Infusion: 192.9 mL    Enteral Tube Flush: 410 mL    Free Water: 750 mL    Glucerna: 720 mL  Total IN: 2072.9 mL    OUT:    Incontinent per Condom Catheter: 850 mL  Total OUT: 850 mL    Total NET: 1222.9 mL      22 Nov 2017 07:01  -  22 Nov 2017 13:35  --------------------------------------------------------  IN:    dexmedetomidine Infusion: 10.5 mL    propofol Infusion: 16.5 mL  Total IN: 27 mL    OUT:    Incontinent per Condom Catheter: 750 mL  Total OUT: 750 mL    Total NET: -723 mL            LABS:                        10.0   6.2   )-----------( 205      ( 22 Nov 2017 06:01 )             31.0     11-22    140  |  101  |  14.0  ----------------------------<  100  4.4   |  26.0  |  0.58    Ca    9.4      22 Nov 2017 06:01  Phos  3.6     11-22  Mg     1.8     11-22            CAPILLARY BLOOD GLUCOSE        PT/INR - ( 22 Nov 2017 06:00 )   PT: 13.5 sec;   INR: 1.22 ratio         PTT - ( 21 Nov 2017 11:34 )  PTT:29.3 sec    CULTURES:  Culture Results:   Numerous Pseudomonas aeruginosa  Moderate Routine respiratory raymundo present (11-18-17 @ 15:12)  Culture Results:   No growth at 5 days. (11-16-17 @ 15:37)  Culture Results:   No growth at 5 days. (11-16-17 @ 15:37)      Physical Examination:    General: No acute distress.  Alert, oriented, interactive, nonfocal    HEENT: Pupils equal, reactive to light.  Symmetric.    PULM: Clear to auscultation bilaterally, no significant sputum production    CVS: Regular rate and rhythm, no murmurs, rubs, or gallops    ABD: Soft, nondistended, nontender, normoactive bowel sounds, no masses    EXT: No edema, nontender    SKIN: Warm and well perfused, no rashes noted.    RADIOLOGY: ***    CRITICAL CARE TIME SPENT: *** Patient is a 62y old  Male who presents with a chief complaint of Shortness of breath (30 Oct 2017 15:03)      BRIEF HOSPITAL COURSE:  63 y/o M with a h/o COPD, a-fib (no a/c), CHF, alcoholic cirrhosis, EtOH abuse, EtOH withdrawal (intubated in past for airway protection), initially admitted on 10/28 for COPD exacerbation and alcohol intoxication, signed out AMA and was found later on in hospital disoriented. Readmitted and RRT called later on when patient became tremulous, agitated, delirious, and began hallucinating. Transferred to MICU for further management with benzos and precedex gtt. Patient became obtunded, lost ability to protect airway, and was subsequently intubated. As per report, patient is highly sensitive to benzodiazepines.  CT head neg for acute events. Course complicated by a-fib with RVR, sepsis, pseudomonas aspiration pneumonia. Self extubated on 11/2 without need for reintubation initially but on the morning of 11/4 patient obtunded  with difficulty managing secretions. Reintubated for airway protection. Pt has had recurrent ruptured  balloons and ETT breakage.  He had failed his weaning trials and had a tracheostomy placed on Thurs. 11/9.  Brought back to the ICU secondary to for fever and poorly controlled AF     Events last 24 hours: s/p peg this am, periods of afib with RVR missed morning meds secondary to peg - improved with digoxin and cardizem    PAST MEDICAL & SURGICAL HISTORY:  Falls  Meningitis  Collapsed lung  Alcohol withdrawal  Emphysema of lung  ETOH abuse  Cirrhosis  Afib  CHF (congestive heart failure)  Poor historian  Alcohol abuse  Chronic atrial fibrillation  S/P BKA (below knee amputation), left: secondary to worsening infection in lower leg. Had both ankle injuries from fall s/p repair - left had complication.  S/P BKA (below knee amputation) unilateral, left      Review of Systems:  unable to obtain      Medications:    diltiazem    Tablet 60 milliGRAM(s) Oral every 6 hours  lisinopril 5 milliGRAM(s) Oral daily  metoprolol     tartrate 50 milliGRAM(s) Oral every 8 hours  metoprolol    tartrate Injectable 5 milliGRAM(s) IV Push every 6 hours PRN  tamsulosin 0.4 milliGRAM(s) Oral at bedtime    ALBUTerol/ipratropium for Nebulization 3 milliLiter(s) Nebulizer every 6 hours    acetaminophen    Suspension 650 milliGRAM(s) Oral every 6 hours PRN  fentaNYL    Injectable 25 MICROGram(s) IV Push every 4 hours PRN  haloperidol    Injectable 2 milliGRAM(s) IV Push every 6 hours PRN  OLANZapine 2.5 milliGRAM(s) Oral daily  OLANZapine 5 milliGRAM(s) Oral at bedtime      enoxaparin Injectable 40 milliGRAM(s) SubCutaneous every 24 hours    bisacodyl Suppository 10 milliGRAM(s) Rectal daily PRN  lactulose Syrup 10 Gram(s) Oral every 12 hours PRN  pantoprazole  Injectable 40 milliGRAM(s) IV Push daily        folic acid 1 milliGRAM(s) Oral daily  multivitamin 1 Tablet(s) Oral daily    influenza   Vaccine 0.5 milliLiter(s) IntraMuscular once    chlorhexidine 0.12% Liquid 15 milliLiter(s) Swish and Spit two times a day    nicotine - 21 mG/24Hr(s) Patch 1 patch Transdermal daily      Mode: AC/ CMV (Assist Control/ Continuous Mandatory Ventilation)  RR (machine): 14  TV (machine): 480  FiO2: 40  PEEP: 5  MAP: 11  PIP: 30      ICU Vital Signs Last 24 Hrs  T(C): 36.9 (22 Nov 2017 11:20), Max: 37.8 (21 Nov 2017 20:00)  T(F): 98.5 (22 Nov 2017 11:20), Max: 100.1 (21 Nov 2017 20:00)  HR: 118 (22 Nov 2017 13:00) (75 - 167)  BP: 106/56 (22 Nov 2017 13:00) (104/64 - 143/74)  BP(mean): 73 (22 Nov 2017 13:00) (73 - 101)  ABP: --  ABP(mean): --  RR: 36 (22 Nov 2017 13:00) (19 - 48)  SpO2: 98% (22 Nov 2017 13:00) (92% - 100%)          I&O's Detail    21 Nov 2017 07:01  -  22 Nov 2017 07:00  --------------------------------------------------------  IN:    dexmedetomidine Infusion: 192.9 mL    Enteral Tube Flush: 410 mL    Free Water: 750 mL    Glucerna: 720 mL  Total IN: 2072.9 mL    OUT:    Incontinent per Condom Catheter: 850 mL  Total OUT: 850 mL    Total NET: 1222.9 mL      22 Nov 2017 07:01  -  22 Nov 2017 13:35  --------------------------------------------------------  IN:    dexmedetomidine Infusion: 10.5 mL    propofol Infusion: 16.5 mL  Total IN: 27 mL    OUT:    Incontinent per Condom Catheter: 750 mL  Total OUT: 750 mL    Total NET: -723 mL            LABS:                        10.0   6.2   )-----------( 205      ( 22 Nov 2017 06:01 )             31.0     11-22    140  |  101  |  14.0  ----------------------------<  100  4.4   |  26.0  |  0.58    Ca    9.4      22 Nov 2017 06:01  Phos  3.6     11-22  Mg     1.8     11-22            CAPILLARY BLOOD GLUCOSE        PT/INR - ( 22 Nov 2017 06:00 )   PT: 13.5 sec;   INR: 1.22 ratio         PTT - ( 21 Nov 2017 11:34 )  PTT:29.3 sec    CULTURES:  Culture Results:   Numerous Pseudomonas aeruginosa  Moderate Routine respiratory raymundo present (11-18-17 @ 15:12)  Culture Results:   No growth at 5 days. (11-16-17 @ 15:37)  Culture Results:   No growth at 5 days. (11-16-17 @ 15:37)      Physical Examination:    General: No acute distress.  Alert,confused     HEENT: Pupils equal, reactive to light.  Symmetric.    PULM: diminished    CVS: afib    ABD: Soft, nondistended, nontender, normoactive bowel sounds, no masses    EXT: No edema, nontender, left bka    SKIN: Warm and well perfused, no rashes noted.

## 2017-11-22 NOTE — PROCEDURE NOTE - NSINDICATIONS_GEN_A_CORE
respiratory failure
Meds
drainage
critical patient/airway protection/mental status change
critical patient/airway protection/mental status change
procedural sedation during a procedure
mental status change/respiratory failure/airway protection

## 2017-11-22 NOTE — PROCEDURE NOTE - NSTOLERANCE_GEN_A_CORE
Patient tolerated procedure well./Reversal agents were not used.
Patient tolerated procedure well.

## 2017-11-22 NOTE — PROCEDURE NOTE - NSPROCNAME_GEN_A_CORE
Gastric Intubation/Gastric Lavage
Tracheal Intubation
Gastric Intubation/Gastric Lavage
Procedural Sedation
Tracheal Intubation

## 2017-11-22 NOTE — PROCEDURE NOTE - NSINFORMCONSENT_GEN_A_CORE
This was an emergent procedure.
Benefits, risks, and possible complications of procedure explained to patient/caregiver who verbalized understanding and gave written consent.
This was an emergent procedure.

## 2017-11-22 NOTE — BRIEF OPERATIVE NOTE - POST-OP DX
Respiratory failure  11/10/2017    Active  Gurjit Aragon
Normal appearance of tissue  11/22/2017    Active  Emigdio Lewis

## 2017-11-22 NOTE — PROCEDURE NOTE - NSSEDATIONDETAIL_GEN_A_CORE
There was continuous monitoring of patient's oxygen saturation, respiratory rate, heart rate, blood pressure, level of consciousness, and physiological status./End tidal CO2 was monitored./Oxygen therapy was applied.

## 2017-11-22 NOTE — BRIEF OPERATIVE NOTE - PROCEDURE
<<-----Click on this checkbox to enter Procedure EGD, with gastrostomy tube insertion  11/22/2017    Active  [id number]

## 2017-11-23 ENCOUNTER — TRANSCRIPTION ENCOUNTER (OUTPATIENT)
Age: 62
End: 2017-11-23

## 2017-11-23 DIAGNOSIS — F10.10 ALCOHOL ABUSE, UNCOMPLICATED: ICD-10-CM

## 2017-11-23 DIAGNOSIS — R13.13 DYSPHAGIA, PHARYNGEAL PHASE: ICD-10-CM

## 2017-11-23 LAB
ANION GAP SERPL CALC-SCNC: 11 MMOL/L — SIGNIFICANT CHANGE UP (ref 5–17)
BUN SERPL-MCNC: 13 MG/DL — SIGNIFICANT CHANGE UP (ref 8–20)
CALCIUM SERPL-MCNC: 8.7 MG/DL — SIGNIFICANT CHANGE UP (ref 8.6–10.2)
CHLORIDE SERPL-SCNC: 101 MMOL/L — SIGNIFICANT CHANGE UP (ref 98–107)
CO2 SERPL-SCNC: 25 MMOL/L — SIGNIFICANT CHANGE UP (ref 22–29)
CREAT SERPL-MCNC: 0.56 MG/DL — SIGNIFICANT CHANGE UP (ref 0.5–1.3)
GLUCOSE SERPL-MCNC: 92 MG/DL — SIGNIFICANT CHANGE UP (ref 70–115)
HCT VFR BLD CALC: 32.7 % — LOW (ref 42–52)
HGB BLD-MCNC: 10.5 G/DL — LOW (ref 14–18)
MAGNESIUM SERPL-MCNC: 1.8 MG/DL — SIGNIFICANT CHANGE UP (ref 1.6–2.6)
MCHC RBC-ENTMCNC: 29.3 PG — SIGNIFICANT CHANGE UP (ref 27–31)
MCHC RBC-ENTMCNC: 32.1 G/DL — SIGNIFICANT CHANGE UP (ref 32–36)
MCV RBC AUTO: 91.3 FL — SIGNIFICANT CHANGE UP (ref 80–94)
PHOSPHATE SERPL-MCNC: 3.8 MG/DL — SIGNIFICANT CHANGE UP (ref 2.4–4.7)
PLATELET # BLD AUTO: 194 K/UL — SIGNIFICANT CHANGE UP (ref 150–400)
POTASSIUM SERPL-MCNC: 4.4 MMOL/L — SIGNIFICANT CHANGE UP (ref 3.5–5.3)
POTASSIUM SERPL-SCNC: 4.4 MMOL/L — SIGNIFICANT CHANGE UP (ref 3.5–5.3)
RBC # BLD: 3.58 M/UL — LOW (ref 4.6–6.2)
RBC # FLD: 16 % — HIGH (ref 11–15.6)
SODIUM SERPL-SCNC: 137 MMOL/L — SIGNIFICANT CHANGE UP (ref 135–145)
WBC # BLD: 7.5 K/UL — SIGNIFICANT CHANGE UP (ref 4.8–10.8)
WBC # FLD AUTO: 7.5 K/UL — SIGNIFICANT CHANGE UP (ref 4.8–10.8)

## 2017-11-23 PROCEDURE — 99233 SBSQ HOSP IP/OBS HIGH 50: CPT

## 2017-11-23 PROCEDURE — 99232 SBSQ HOSP IP/OBS MODERATE 35: CPT

## 2017-11-23 RX ORDER — MAGNESIUM SULFATE 500 MG/ML
2 VIAL (ML) INJECTION ONCE
Qty: 0 | Refills: 0 | Status: COMPLETED | OUTPATIENT
Start: 2017-11-23 | End: 2017-11-23

## 2017-11-23 RX ADMIN — OLANZAPINE 5 MILLIGRAM(S): 15 TABLET, FILM COATED ORAL at 23:21

## 2017-11-23 RX ADMIN — ENOXAPARIN SODIUM 40 MILLIGRAM(S): 100 INJECTION SUBCUTANEOUS at 13:18

## 2017-11-23 RX ADMIN — Medication 1 MILLIGRAM(S): at 13:18

## 2017-11-23 RX ADMIN — Medication 1 PATCH: at 11:10

## 2017-11-23 RX ADMIN — Medication 650 MILLIGRAM(S): at 23:24

## 2017-11-23 RX ADMIN — CHLORHEXIDINE GLUCONATE 15 MILLILITER(S): 213 SOLUTION TOPICAL at 05:16

## 2017-11-23 RX ADMIN — Medication 3 MILLILITER(S): at 14:34

## 2017-11-23 RX ADMIN — Medication 3 MILLILITER(S): at 03:11

## 2017-11-23 RX ADMIN — Medication 1 TABLET(S): at 13:18

## 2017-11-23 RX ADMIN — CHLORHEXIDINE GLUCONATE 15 MILLILITER(S): 213 SOLUTION TOPICAL at 17:59

## 2017-11-23 RX ADMIN — Medication 50 MILLIGRAM(S): at 23:22

## 2017-11-23 RX ADMIN — TAMSULOSIN HYDROCHLORIDE 0.4 MILLIGRAM(S): 0.4 CAPSULE ORAL at 23:21

## 2017-11-23 RX ADMIN — PANTOPRAZOLE SODIUM 40 MILLIGRAM(S): 20 TABLET, DELAYED RELEASE ORAL at 13:19

## 2017-11-23 RX ADMIN — Medication 3 MILLILITER(S): at 09:15

## 2017-11-23 RX ADMIN — LISINOPRIL 5 MILLIGRAM(S): 2.5 TABLET ORAL at 05:16

## 2017-11-23 RX ADMIN — Medication 1 PATCH: at 13:19

## 2017-11-23 RX ADMIN — Medication 50 MILLIGRAM(S): at 05:16

## 2017-11-23 RX ADMIN — Medication 50 GRAM(S): at 08:58

## 2017-11-23 RX ADMIN — Medication 650 MILLIGRAM(S): at 04:25

## 2017-11-23 RX ADMIN — Medication 50 MILLIGRAM(S): at 14:11

## 2017-11-23 RX ADMIN — Medication 3 MILLILITER(S): at 20:18

## 2017-11-23 RX ADMIN — OLANZAPINE 2.5 MILLIGRAM(S): 15 TABLET, FILM COATED ORAL at 13:19

## 2017-11-23 NOTE — PROGRESS NOTE ADULT - ASSESSMENT
62 yom etoh abuse with encephalopathy, trached for respiratory failure now with chronic respiratory failure, encephalopathy improving, s/p PEG, Afib with RVR

## 2017-11-23 NOTE — PROGRESS NOTE ADULT - ATTENDING COMMENTS
Pt seen and examined with CCM PA on rounds today, agree with above assessment and plan. PEG inserted yesterday will resume feeds. Pt remains with delirium and agitation and requires frequent reorientation and observation for safety. We are continuing to wean the vent daily but the process has been slow. He requires monitoring for AF as his rate is often uncontrolled. Will start process of finding LTAC.

## 2017-11-23 NOTE — PROGRESS NOTE ADULT - SUBJECTIVE AND OBJECTIVE BOX
Patient is a 62y old  Male who presents with a chief complaint of Shortness of breath (30 Oct 2017 15:03)      BRIEF HOSPITAL COURSE:  61 y/o M with a h/o COPD, a-fib (no a/c), CHF, alcoholic cirrhosis, EtOH abuse, EtOH withdrawal (intubated in past for airway protection), initially admitted on 10/28 for COPD exacerbation and alcohol intoxication, signed out AMA and was found later on in hospital disoriented. Readmitted and RRT called later on when patient became tremulous, agitated, delirious, and began hallucinating. Transferred to MICU for further management with benzos and precedex gtt. Patient became obtunded, lost ability to protect airway, and was subsequently intubated. As per report, patient is highly sensitive to benzodiazepines.  CT head neg for acute events. Course complicated by a-fib with RVR, sepsis, pseudomonas aspiration pneumonia. Self extubated on 11/2 without need for reintubation initially but on the morning of 11/4 patient obtunded  with difficulty managing secretions. Reintubated for airway protection. Pt has had recurrent ruptured  balloons and ETT breakage.  He had failed his weaning trials and had a tracheostomy placed on Thurs. 11/9.  Brought back to the ICU secondary to for fever and poorly controlled AF     Events last 24 hours: s/p peg yesterday, still with periods of Afib with RVR, will continue to monitor in ICU, BP softer today likely due to high dose blockers needed to control HR, may need to adjust meds.    PAST MEDICAL & SURGICAL HISTORY:  Falls  Meningitis  Collapsed lung  Alcohol withdrawal  Emphysema of lung  ETOH abuse  Cirrhosis  Afib  CHF (congestive heart failure)  Poor historian  Alcohol abuse  Chronic atrial fibrillation  S/P BKA (below knee amputation), left: secondary to worsening infection in lower leg. Had both ankle injuries from fall s/p repair - left had complication.  S/P BKA (below knee amputation) unilateral, left      Review of Systems:  unable to obtain      MEDICATIONS  (STANDING):  ALBUTerol/ipratropium for Nebulization 3 milliLiter(s) Nebulizer every 6 hours  chlorhexidine 0.12% Liquid 15 milliLiter(s) Swish and Spit two times a day  diltiazem    Tablet 60 milliGRAM(s) Oral every 6 hours  enoxaparin Injectable 40 milliGRAM(s) SubCutaneous every 24 hours  folic acid 1 milliGRAM(s) Oral daily  influenza   Vaccine 0.5 milliLiter(s) IntraMuscular once  lisinopril 5 milliGRAM(s) Oral daily  metoprolol     tartrate 50 milliGRAM(s) Oral every 8 hours  multivitamin 1 Tablet(s) Oral daily  nicotine - 21 mG/24Hr(s) Patch 1 patch Transdermal daily  OLANZapine 2.5 milliGRAM(s) Oral daily  OLANZapine 5 milliGRAM(s) Oral at bedtime  pantoprazole  Injectable 40 milliGRAM(s) IV Push daily  tamsulosin 0.4 milliGRAM(s) Oral at bedtime                 10.5   7.5    )----------(  194       ( 23 Nov 2017 05:03 )               32.7      137    |  101    |  13.0   ----------------------------<  92         ( 23 Nov 2017 05:03 )  4.4     |  25.0   |  0.56     Ca    8.7        ( 23 Nov 2017 05:03 )  Phos  3.8       ( 23 Nov 2017 05:03 )  Mg     1.8       ( 23 Nov 2017 05:03 )        ICU Vital Signs Last 24 Hrs  T(C): 36.6 (23 Nov 2017 11:52), Max: 38.6 (23 Nov 2017 04:00)  T(F): 97.9 (23 Nov 2017 11:52), Max: 101.5 (23 Nov 2017 04:00)  HR: 118 (23 Nov 2017 13:25) (65 - 133)  BP: 103/55 (23 Nov 2017 08:00) (72/51 - 167/83)  BP(mean): 73 (23 Nov 2017 08:00) (57 - 111)  ABP: --  ABP(mean): --  RR: 32 (23 Nov 2017 08:00) (13 - 48)  SpO2: 100% (23 Nov 2017 13:25) (86% - 100%)      CAPILLARY BLOOD GLUCOSE          Physical Examination:    General: No acute distress.  Alert,confused, calm this am    HEENT: Pupils equal, reactive to light.  Symmetric.    PULM: diminished at bases b/l, otherwise clear    CVS: afib, irregular, +S1,S2    ABD: Soft, nondistended, PEG in place, dressing c/d/i, mild tenderness around PEG site, normoactive bowel sounds, no masses    EXT: No edema, nontender, left bka    SKIN: Warm and well perfused, no rashes noted. Patient is a 62y old  Male who presents with a chief complaint of Shortness of breath (30 Oct 2017 15:03)      BRIEF HOSPITAL COURSE:  61 y/o M with a h/o COPD, a-fib (no a/c), CHF, alcoholic cirrhosis, EtOH abuse, EtOH withdrawal (intubated in past for airway protection), initially admitted on 10/28 for COPD exacerbation and alcohol intoxication, signed out AMA and was found later on in hospital disoriented. Readmitted and RRT called later on when patient became tremulous, agitated, delirious, and began hallucinating. Transferred to MICU for further management with benzos and precedex gtt. Patient became obtunded, lost ability to protect airway, and was subsequently intubated. As per report, patient is highly sensitive to benzodiazepines.  CT head neg for acute events. Course complicated by a-fib with RVR, sepsis, pseudomonas aspiration pneumonia. Self extubated on 11/2 without need for reintubation initially but on the morning of 11/4 patient obtunded  with difficulty managing secretions. Reintubated for airway protection. Pt has had recurrent ruptured  balloons and ETT breakage.  He had failed his weaning trials and had a tracheostomy placed on Thurs. 11/9.  Brought back to the ICU secondary to for fever and poorly controlled AF     Events last 24 hours: s/p peg yesterday, still with periods of Afib with RVR, will continue to monitor in ICU, BP softer today likely due to high dose blockers needed to control HR, may need to adjust meds.    PAST MEDICAL & SURGICAL HISTORY:  Falls  Meningitis  Collapsed lung  Alcohol withdrawal  Emphysema of lung  ETOH abuse  Cirrhosis  Afib  CHF (congestive heart failure)  Poor historian  Alcohol abuse  Chronic atrial fibrillation  S/P BKA (below knee amputation), left: secondary to worsening infection in lower leg. Had both ankle injuries from fall s/p repair - left had complication.  S/P BKA (below knee amputation) unilateral, left      Review of Systems:  unable to obtain      MEDICATIONS  (STANDING):  ALBUTerol/ipratropium for Nebulization 3 milliLiter(s) Nebulizer every 6 hours  chlorhexidine 0.12% Liquid 15 milliLiter(s) Swish and Spit two times a day  diltiazem    Tablet 60 milliGRAM(s) Oral every 6 hours  enoxaparin Injectable 40 milliGRAM(s) SubCutaneous every 24 hours  folic acid 1 milliGRAM(s) Oral daily  influenza   Vaccine 0.5 milliLiter(s) IntraMuscular once  lisinopril 5 milliGRAM(s) Oral daily  metoprolol     tartrate 50 milliGRAM(s) Oral every 8 hours  multivitamin 1 Tablet(s) Oral daily  nicotine - 21 mG/24Hr(s) Patch 1 patch Transdermal daily  OLANZapine 2.5 milliGRAM(s) Oral daily  OLANZapine 5 milliGRAM(s) Oral at bedtime  pantoprazole  Injectable 40 milliGRAM(s) IV Push daily  tamsulosin 0.4 milliGRAM(s) Oral at bedtime                 10.5   7.5    )----------(  194       ( 23 Nov 2017 05:03 )               32.7      137    |  101    |  13.0   ----------------------------<  92         ( 23 Nov 2017 05:03 )  4.4     |  25.0   |  0.56     Ca    8.7        ( 23 Nov 2017 05:03 )  Phos  3.8       ( 23 Nov 2017 05:03 )  Mg     1.8       ( 23 Nov 2017 05:03 )        ICU Vital Signs Last 24 Hrs  T(C): 36.6 (23 Nov 2017 11:52), Max: 38.6 (23 Nov 2017 04:00)  T(F): 97.9 (23 Nov 2017 11:52), Max: 101.5 (23 Nov 2017 04:00)  HR: 118 (23 Nov 2017 13:25) (65 - 133)  BP: 103/55 (23 Nov 2017 08:00) (72/51 - 167/83)  BP(mean): 73 (23 Nov 2017 08:00) (57 - 111)  ABP: --  ABP(mean): --  RR: 32 (23 Nov 2017 08:00) (13 - 48)  SpO2: 100% (23 Nov 2017 13:25) (86% - 100%)      CAPILLARY BLOOD GLUCOSE          Physical Examination:    General: No acute distress.  Alert,confused, calm this am    HEENT: Pupils equal, reactive to light.  Symmetric.    PULM: diminished at bases b/l, otherwise clear    CVS: afib, irregular, +S1,S2    ABD: Soft, nondistended, PEG in place, dressing c/d/i, mild tenderness around PEG site, normoactive bowel sounds, no masses    EXT: No edema, nontender, left bka    SKIN: Warm and well perfused, no rashes noted.     Critical care time 30 min

## 2017-11-23 NOTE — PROGRESS NOTE ADULT - SUBJECTIVE AND OBJECTIVE BOX
Patient is a 62y old  Male who presents with a chief complaint of Shortness of breath (30 Oct 2017 15:03)      HPI: seen in ICU  62 years old male with PMH of A. Fib (not on AC due to falls), CHF, COPD and Alcoholism brought by EMS . Had ETOH withdrawl and aspiration PNA. + encephalopathy. Had Afib as well. Trached. Yesterday pt had  PE placed. He had fever 101.5 early this am. No afebrile. Pt is awake and alert. No complaints    REVIEW OF SYSTEMS:  Constitutional: No fever, weight loss or fatigue  ENMT:  No difficulty hearing, tinnitus, vertigo; No sinus or throat pain  Respiratory: No cough, wheezing, chills or hemoptysis  Cardiovascular: No chest pain, palpitations, dizziness or leg swelling  Gastrointestinal: No abdominal or epigastric pain. No nausea, vomiting or hematemesis; No diarrhea or constipation. No melena or hematochezia.  Skin: No itching, burning, rashes or lesions   Musculoskeletal: No joint pain or swelling; No muscle, back or extremity pain    PAST MEDICAL & SURGICAL HISTORY:  Falls  Meningitis  Collapsed lung  Alcohol withdrawal  Emphysema of lung  ETOH abuse  Cirrhosis  Afib  CHF (congestive heart failure)  Poor historian  Alcohol abuse  Chronic atrial fibrillation  S/P BKA (below knee amputation), left: secondary to worsening infection in lower leg. Had both ankle injuries from fall s/p repair - left had complication.  S/P BKA (below knee amputation) unilateral, left      FAMILY HISTORY:  Family history unknown: Adopted  No pertinent family history in first degree relatives      SOCIAL HISTORY:  Smoking Status: [ ] Current, [ ] Former, [ ] Never  Pack Years:  [  ] EtOH-no  [  ] IVDA-no    MEDICATIONS:  MEDICATIONS  (STANDING):  ALBUTerol/ipratropium for Nebulization 3 milliLiter(s) Nebulizer every 6 hours  chlorhexidine 0.12% Liquid 15 milliLiter(s) Swish and Spit two times a day  diltiazem    Tablet 60 milliGRAM(s) Oral every 6 hours  enoxaparin Injectable 40 milliGRAM(s) SubCutaneous every 24 hours  folic acid 1 milliGRAM(s) Oral daily  influenza   Vaccine 0.5 milliLiter(s) IntraMuscular once  lisinopril 5 milliGRAM(s) Oral daily  metoprolol     tartrate 50 milliGRAM(s) Oral every 8 hours  multivitamin 1 Tablet(s) Oral daily  nicotine - 21 mG/24Hr(s) Patch 1 patch Transdermal daily  OLANZapine 2.5 milliGRAM(s) Oral daily  OLANZapine 5 milliGRAM(s) Oral at bedtime  pantoprazole  Injectable 40 milliGRAM(s) IV Push daily  tamsulosin 0.4 milliGRAM(s) Oral at bedtime    MEDICATIONS  (PRN):  acetaminophen    Suspension 650 milliGRAM(s) Oral every 6 hours PRN For Temp greater than 38 C (100.4 F)  bisacodyl Suppository 10 milliGRAM(s) Rectal daily PRN Constipation  fentaNYL    Injectable 25 MICROGram(s) IV Push every 4 hours PRN Moderate Pain (4 - 6)  haloperidol    Injectable 2 milliGRAM(s) IV Push every 6 hours PRN agitation  lactulose Syrup 10 Gram(s) Oral every 12 hours PRN No bowel Movements for 24 hours  metoprolol    tartrate Injectable 5 milliGRAM(s) IV Push every 6 hours PRN HR > 120      Allergies    Ceclor (Rash)    Intolerances        Vital Signs Last 24 Hrs  T(C): 37.5 (23 Nov 2017 07:36), Max: 38.6 (23 Nov 2017 04:00)  T(F): 99.5 (23 Nov 2017 07:36), Max: 101.5 (23 Nov 2017 04:00)  HR: 94 (23 Nov 2017 09:16) (65 - 167)  BP: 103/55 (23 Nov 2017 08:00) (72/51 - 167/83)  BP(mean): 73 (23 Nov 2017 08:00) (57 - 111)  RR: 32 (23 Nov 2017 08:00) (13 - 48)  SpO2: 99% (23 Nov 2017 09:16) (86% - 100%)    11-22 @ 07:01  -  11-23 @ 07:00  --------------------------------------------------------  IN: 27 mL / OUT: 2250 mL / NET: -2223 mL      Mode: CPAP with PS  FiO2: 40  PEEP: 5  PS: 15  MAP: 9      PHYSICAL EXAM:    General: Well developed; well nourished; in no acute distress  HEENT: MMM, conjunctiva and sclera clear  H- RRR  L- CTA  Gastrointestinal: Soft,  non-distended; Normal bowel sounds; No rebound or guarding. PEG site appears clean and dry. Minimal tenderness at the PEG site  ( expected day one after PEG placement). NO pus  Extremities: Normal range of motion, No clubbing, cyanosis or edema  Neurological: Alert and oriented x3  Skin: Warm and dry. No obvious rash      LABS:                        10.5   7.5   )-----------( 194      ( 23 Nov 2017 05:03 )             32.7     23 Nov 2017 05:03    137    |  101    |  13.0   ----------------------------<  92     4.4     |  25.0   |  0.56     Ca    8.7        23 Nov 2017 05:03  Phos  3.8       23 Nov 2017 05:03  Mg     1.8       23 Nov 2017 05:03                RADIOLOGY & ADDITIONAL STUDIES:

## 2017-11-24 LAB
ANION GAP SERPL CALC-SCNC: 11 MMOL/L — SIGNIFICANT CHANGE UP (ref 5–17)
BUN SERPL-MCNC: 20 MG/DL — SIGNIFICANT CHANGE UP (ref 8–20)
CALCIUM SERPL-MCNC: 8.9 MG/DL — SIGNIFICANT CHANGE UP (ref 8.6–10.2)
CHLORIDE SERPL-SCNC: 102 MMOL/L — SIGNIFICANT CHANGE UP (ref 98–107)
CO2 SERPL-SCNC: 27 MMOL/L — SIGNIFICANT CHANGE UP (ref 22–29)
CREAT SERPL-MCNC: 0.65 MG/DL — SIGNIFICANT CHANGE UP (ref 0.5–1.3)
GLUCOSE SERPL-MCNC: 117 MG/DL — HIGH (ref 70–115)
HCT VFR BLD CALC: 31.1 % — LOW (ref 42–52)
HGB BLD-MCNC: 10.1 G/DL — LOW (ref 14–18)
MAGNESIUM SERPL-MCNC: 2.1 MG/DL — SIGNIFICANT CHANGE UP (ref 1.6–2.6)
MCHC RBC-ENTMCNC: 29.8 PG — SIGNIFICANT CHANGE UP (ref 27–31)
MCHC RBC-ENTMCNC: 32.5 G/DL — SIGNIFICANT CHANGE UP (ref 32–36)
MCV RBC AUTO: 91.7 FL — SIGNIFICANT CHANGE UP (ref 80–94)
PHOSPHATE SERPL-MCNC: 3.5 MG/DL — SIGNIFICANT CHANGE UP (ref 2.4–4.7)
PLATELET # BLD AUTO: 258 K/UL — SIGNIFICANT CHANGE UP (ref 150–400)
POTASSIUM SERPL-MCNC: 4.2 MMOL/L — SIGNIFICANT CHANGE UP (ref 3.5–5.3)
POTASSIUM SERPL-SCNC: 4.2 MMOL/L — SIGNIFICANT CHANGE UP (ref 3.5–5.3)
RBC # BLD: 3.39 M/UL — LOW (ref 4.6–6.2)
RBC # FLD: 16.4 % — HIGH (ref 11–15.6)
SODIUM SERPL-SCNC: 140 MMOL/L — SIGNIFICANT CHANGE UP (ref 135–145)
WBC # BLD: 9.8 K/UL — SIGNIFICANT CHANGE UP (ref 4.8–10.8)
WBC # FLD AUTO: 9.8 K/UL — SIGNIFICANT CHANGE UP (ref 4.8–10.8)

## 2017-11-24 PROCEDURE — 99232 SBSQ HOSP IP/OBS MODERATE 35: CPT

## 2017-11-24 PROCEDURE — 99233 SBSQ HOSP IP/OBS HIGH 50: CPT

## 2017-11-24 RX ORDER — DIGOXIN 250 MCG
0.12 TABLET ORAL DAILY
Qty: 0 | Refills: 0 | Status: DISCONTINUED | OUTPATIENT
Start: 2017-11-26 | End: 2017-12-14

## 2017-11-24 RX ORDER — METOPROLOL TARTRATE 50 MG
25 TABLET ORAL EVERY 8 HOURS
Qty: 0 | Refills: 0 | Status: DISCONTINUED | OUTPATIENT
Start: 2017-11-24 | End: 2017-12-14

## 2017-11-24 RX ORDER — DIGOXIN 250 MCG
0.25 TABLET ORAL EVERY 8 HOURS
Qty: 0 | Refills: 0 | Status: COMPLETED | OUTPATIENT
Start: 2017-11-25 | End: 2017-11-25

## 2017-11-24 RX ORDER — DIGOXIN 250 MCG
0.5 TABLET ORAL ONCE
Qty: 0 | Refills: 0 | Status: COMPLETED | OUTPATIENT
Start: 2017-11-24 | End: 2017-11-24

## 2017-11-24 RX ORDER — LACTULOSE 10 G/15ML
10 SOLUTION ORAL DAILY
Qty: 0 | Refills: 0 | Status: DISCONTINUED | OUTPATIENT
Start: 2017-11-24 | End: 2017-12-14

## 2017-11-24 RX ADMIN — CHLORHEXIDINE GLUCONATE 15 MILLILITER(S): 213 SOLUTION TOPICAL at 06:26

## 2017-11-24 RX ADMIN — Medication 1 TABLET(S): at 11:04

## 2017-11-24 RX ADMIN — Medication 0.5 MILLIGRAM(S): at 18:56

## 2017-11-24 RX ADMIN — Medication 1 MILLIGRAM(S): at 11:05

## 2017-11-24 RX ADMIN — Medication 1 PATCH: at 12:13

## 2017-11-24 RX ADMIN — Medication 3 MILLILITER(S): at 21:17

## 2017-11-24 RX ADMIN — HALOPERIDOL DECANOATE 2 MILLIGRAM(S): 100 INJECTION INTRAMUSCULAR at 18:56

## 2017-11-24 RX ADMIN — OLANZAPINE 5 MILLIGRAM(S): 15 TABLET, FILM COATED ORAL at 23:27

## 2017-11-24 RX ADMIN — PANTOPRAZOLE SODIUM 40 MILLIGRAM(S): 20 TABLET, DELAYED RELEASE ORAL at 11:06

## 2017-11-24 RX ADMIN — TAMSULOSIN HYDROCHLORIDE 0.4 MILLIGRAM(S): 0.4 CAPSULE ORAL at 23:27

## 2017-11-24 RX ADMIN — CHLORHEXIDINE GLUCONATE 15 MILLILITER(S): 213 SOLUTION TOPICAL at 17:37

## 2017-11-24 RX ADMIN — Medication 25 MILLIGRAM(S): at 23:26

## 2017-11-24 RX ADMIN — Medication 3 MILLILITER(S): at 10:40

## 2017-11-24 RX ADMIN — Medication 1 PATCH: at 11:04

## 2017-11-24 RX ADMIN — ENOXAPARIN SODIUM 40 MILLIGRAM(S): 100 INJECTION SUBCUTANEOUS at 11:06

## 2017-11-24 RX ADMIN — LACTULOSE 10 GRAM(S): 10 SOLUTION ORAL at 11:04

## 2017-11-24 RX ADMIN — Medication 3 MILLILITER(S): at 17:06

## 2017-11-24 RX ADMIN — Medication 3 MILLILITER(S): at 03:28

## 2017-11-24 RX ADMIN — OLANZAPINE 2.5 MILLIGRAM(S): 15 TABLET, FILM COATED ORAL at 11:04

## 2017-11-24 NOTE — CHART NOTE - NSCHARTNOTEFT_GEN_A_CORE
Source: Patient [ ]  Family [ ]   other [x ] chart    Current Diet: NPO with TF     Patient reports [ ] nausea  [ ] vomiting [ ] diarrhea [ ] constipation  [ ]chewing problems [ ] swallowing issues  [ ] other:     PO intake:  < 50% [ ]   50-75%  [ ]   %  [ ]  other :    Source for PO intake [ ] Patient [ ] family [ ] chart [ ] staff [ ] other    Enteral /Parenteral Nutrition: Glucerna @ 60ml/hr x 20 hrs via PEG tube     Current Weight:   11/23: 214#  11/17:  229#  10/20:  229# - will continue to monitor     % Weight Change     Pertinent Medications: MEDICATIONS  (STANDING):  ALBUTerol/ipratropium for Nebulization 3 milliLiter(s) Nebulizer every 6 hours  chlorhexidine 0.12% Liquid 15 milliLiter(s) Swish and Spit two times a day  diltiazem    Tablet 60 milliGRAM(s) Oral every 6 hours  enoxaparin Injectable 40 milliGRAM(s) SubCutaneous every 24 hours  folic acid 1 milliGRAM(s) Oral daily  influenza   Vaccine 0.5 milliLiter(s) IntraMuscular once  lactulose Syrup 10 Gram(s) Oral daily  lisinopril 5 milliGRAM(s) Oral daily  multivitamin 1 Tablet(s) Oral daily  nicotine - 21 mG/24Hr(s) Patch 1 patch Transdermal daily  OLANZapine 2.5 milliGRAM(s) Oral daily  OLANZapine 5 milliGRAM(s) Oral at bedtime  pantoprazole  Injectable 40 milliGRAM(s) IV Push daily  tamsulosin 0.4 milliGRAM(s) Oral at bedtime    MEDICATIONS  (PRN):  acetaminophen    Suspension 650 milliGRAM(s) Oral every 6 hours PRN For Temp greater than 38 C (100.4 F)  bisacodyl Suppository 10 milliGRAM(s) Rectal daily PRN Constipation  fentaNYL    Injectable 25 MICROGram(s) IV Push every 4 hours PRN Moderate Pain (4 - 6)  haloperidol    Injectable 2 milliGRAM(s) IV Push every 6 hours PRN agitation  metoprolol    tartrate Injectable 5 milliGRAM(s) IV Push every 6 hours PRN HR > 120    Pertinent Labs: CBC Full  -  ( 24 Nov 2017 05:40 )  WBC Count : 9.8 K/uL  Hemoglobin : 10.1 g/dL  Hematocrit : 31.1 %  Platelet Count - Automated : 258 K/uL  Mean Cell Volume : 91.7 fl  Mean Cell Hemoglobin : 29.8 pg  Mean Cell Hemoglobin Concentration : 32.5 g/dL  Auto Neutrophil # : x  Auto Lymphocyte # : x  Auto Monocyte # : x  Auto Eosinophil # : x  Auto Basophil # : x  Auto Neutrophil % : x  Auto Lymphocyte % : x  Auto Monocyte % : x  Auto Eosinophil % : x  Auto Basophil % : x    11-24 Na140 mmol/L Glu 117 mg/dL<H> K+ 4.2 mmol/L Cr  0.65 mg/dL BUN 20.0 mg/dL Phos 3.5 mg/dL Alb n/a   PAB n/a         Skin: no pressure ulcers noted     Nutrition focused physical exam NOT conducted - found signs of malnutrition [ ]absent [ ]present    Subcutaneous fat loss: [ ] Orbital fat pads region, [ ]Buccal fat region, [ ]Triceps region,  [ ]Ribs region    Muscle wasting: [ ]Temples region, [ ]Clavicle region, [ ]Shoulder region, [ ]Scapula region, [ ]Interosseous region,  [ ]thigh region, [ ]Calf region    Estimated Needs:   [x ] no change since previous assessment  [ ] recalculated:     Pt is s/p trach, PEG placed 11/22. EN restarted, pt tolerating it well. Noted per progress notes, pt will need LTAC. Weight discrepancy noted, will continue to monitor.     Recommendations:   1) Continue Glucerna @ 60ml/hr x 20 hrs via PEG    Monitoring and Evaluation:   [ ] PO intake [x ] Tolerance to diet prescription [X] Weights  [X] Follow up per protocol [X] Labs:

## 2017-11-24 NOTE — PROGRESS NOTE ADULT - PROBLEM SELECTOR PLAN 2
with periods of RVR  c/w rate and rhythm control agents with periods of RVR  c/w rate and rhythm control agents  scaled back Lopressor as pt BP on the lower side  may need to be on digoxin

## 2017-11-24 NOTE — PROGRESS NOTE ADULT - SUBJECTIVE AND OBJECTIVE BOX
Patient seen and examined;  POD #2 from PEG insertion; normal EGD.  PEG site looks good.  No bleeding or erythema.  Spins freely.  Again had Temp last night at 101.5.  Currently not on any antibiotics.  No obvious pneumonia or UTI.  No Chnge in status.  Glucerna 1.5 infusing at 60 cc/ hr.      PAST MEDICAL & SURGICAL HISTORY:  Falls  Meningitis  Collapsed lung  Alcohol withdrawal  Emphysema of lung  ETOH abuse  Cirrhosis  Afib  CHF (congestive heart failure)  Poor historian  Alcohol abuse  Chronic atrial fibrillation  S/P BKA (below knee amputation), left: secondary to worsening infection in lower leg. Had both ankle injuries from fall s/p repair - left had complication.  S/P BKA (below knee amputation) unilateral, left      ROS:  No Heartburn, regurgitation, dysphagia, odynophagia.  No dyspepsia  No abdominal pain.    No Nausea, vomiting.  No Bleeding.  No hematemesis.   No diarrhea.    No hematochesia.  No weight loss, anorexia.  No edema.      MEDICATIONS  (STANDING):  ALBUTerol/ipratropium for Nebulization 3 milliLiter(s) Nebulizer every 6 hours  chlorhexidine 0.12% Liquid 15 milliLiter(s) Swish and Spit two times a day  diltiazem    Tablet 60 milliGRAM(s) Oral every 6 hours  enoxaparin Injectable 40 milliGRAM(s) SubCutaneous every 24 hours  folic acid 1 milliGRAM(s) Oral daily  influenza   Vaccine 0.5 milliLiter(s) IntraMuscular once  lactulose Syrup 10 Gram(s) Oral daily  lisinopril 5 milliGRAM(s) Oral daily  metoprolol     tartrate 25 milliGRAM(s) Oral every 8 hours  multivitamin 1 Tablet(s) Oral daily  nicotine - 21 mG/24Hr(s) Patch 1 patch Transdermal daily  OLANZapine 2.5 milliGRAM(s) Oral daily  OLANZapine 5 milliGRAM(s) Oral at bedtime  pantoprazole  Injectable 40 milliGRAM(s) IV Push daily  tamsulosin 0.4 milliGRAM(s) Oral at bedtime    MEDICATIONS  (PRN):  acetaminophen    Suspension 650 milliGRAM(s) Oral every 6 hours PRN For Temp greater than 38 C (100.4 F)  bisacodyl Suppository 10 milliGRAM(s) Rectal daily PRN Constipation  fentaNYL    Injectable 25 MICROGram(s) IV Push every 4 hours PRN Moderate Pain (4 - 6)  haloperidol    Injectable 2 milliGRAM(s) IV Push every 6 hours PRN agitation  metoprolol    tartrate Injectable 5 milliGRAM(s) IV Push every 6 hours PRN HR > 120      Allergies    Ceclor (Rash)    Intolerances        Vital Signs Last 24 Hrs  T(C): 37.2 (24 Nov 2017 09:00), Max: 38.6 (23 Nov 2017 23:00)  T(F): 99 (24 Nov 2017 09:00), Max: 101.5 (23 Nov 2017 23:00)  HR: 102 (24 Nov 2017 10:43) (84 - 159)  BP: 87/53 (24 Nov 2017 09:00) (79/48 - 128/58)  BP(mean): 64 (24 Nov 2017 09:00) (59 - 95)  RR: 27 (24 Nov 2017 09:00) (16 - 38)  SpO2: 100% (24 Nov 2017 10:43) (100% - 100%)    PHYSICAL EXAM:    GENERAL: NAD, well-groomed, well-developed  HEAD:  Atraumatic, Normocephalic  EYES: EOMI, PERRLA, conjunctiva and sclera clear  ENMT: No tonsillar erythema, exudates, or enlargement; Moist mucous membranes, Good dentition, No lesions  NECK: Supple, No JVD, Normal thyroid  CHEST/LUNG: Clear to percussion bilaterally; No rales, rhonchi, wheezing, or rubs  HEART: Regular rate and rhythm; No murmurs, rubs, or gallops  ABDOMEN: Soft, Nontender, Nondistended; Bowel sounds present;  No HSM.  No MTR;  PEG site intact in LUQ.  No bleeding; small crusting.    EXTREMITIES:  2+ Peripheral Pulses, No clubbing, cyanosis, or edema  LYMPH: No lymphadenopathy noted  SKIN: No rashes or lesions      LABS:                        10.1   9.8   )-----------( 258      ( 24 Nov 2017 05:40 )             31.1     11-24    140  |  102  |  20.0  ----------------------------<  117<H>  4.2   |  27.0  |  0.65    Ca    8.9      24 Nov 2017 05:40  Phos  3.5     11-24  Mg     2.1     11-24               RADIOLOGY & ADDITIONAL STUDIES:

## 2017-11-24 NOTE — PROGRESS NOTE ADULT - ASSESSMENT
63 y/o male pmhx ETOH abuse with encephalopathy,  s/p trach for respiratory failure, s/p PEG, afib with RVR

## 2017-11-24 NOTE — PROGRESS NOTE ADULT - SUBJECTIVE AND OBJECTIVE BOX
Patient is a 62y old  Male who presents with a chief complaint of Shortness of breath (30 Oct 2017 15:03)      BRIEF HOSPITAL COURSE: 61 y/o M with a h/o COPD, a-fib (no a/c), CHF, alcoholic cirrhosis, EtOH abuse, EtOH withdrawal (intubated in past for airway protection), initially admitted on 10/28 for COPD exacerbation and alcohol intoxication, signed out AMA and was found later on in hospital disoriented. Readmitted and RRT called later on when patient became tremulous, agitated, delirious, and began hallucinating. Transferred to MICU for further management with benzos and precedex gtt. Patient became obtunded, lost ability to protect airway, and was subsequently intubated. As per report, patient is highly sensitive to benzodiazepines.  CT head neg for acute events. Course complicated by a-fib with RVR, sepsis, pseudomonas aspiration pneumonia. Self extubated on 11/2 without need for reintubation initially but on the morning of 11/4 patient obtunded  with difficulty managing secretions. Reintubated for airway protection. Pt has had recurrent ruptured  balloons and ETT breakage.  He had failed his weaning trials and had a tracheostomy placed on Thurs. 11/9.  Brought back to the ICU secondary to for fever and poorly controlled AF       Events last 24 hours: Patient failed SBT on CPAP15/5. BP continuing to soften but patient remained HD stable with MAP > 65, will continue ICU monitoring     PAST MEDICAL & SURGICAL HISTORY:  Falls  Meningitis  Collapsed lung  Alcohol withdrawal  Emphysema of lung  ETOH abuse  Cirrhosis  Afib  CHF (congestive heart failure)  Poor historian  Alcohol abuse  Chronic atrial fibrillation  S/P BKA (below knee amputation), left: secondary to worsening infection in lower leg. Had both ankle injuries from fall s/p repair - left had complication.  S/P BKA (below knee amputation) unilateral, left      Review of Systems:  CONSTITUTIONAL: No fever, chills, or fatigue  EYES: No eye pain, visual disturbances, or discharge  ENMT:  No difficulty hearing, tinnitus, vertigo; No sinus or throat pain  NECK: No pain or stiffness  RESPIRATORY: No cough, wheezing, chills or hemoptysis; No shortness of breath  CARDIOVASCULAR: No chest pain, palpitations, dizziness, or leg swelling  GASTROINTESTINAL: No abdominal or epigastric pain. No nausea, vomiting, or hematemesis; No diarrhea or constipation. No melena or hematochezia.  GENITOURINARY: No dysuria, frequency, hematuria, or incontinence  NEUROLOGICAL: No headaches, memory loss, loss of strength, numbness, or tremors  SKIN: No itching, burning, rashes, or lesions   MUSCULOSKELETAL: No joint pain or swelling; No muscle, back, or extremity pain  PSYCHIATRIC: No depression, anxiety, mood swings, or difficulty sleeping      Medications:    diltiazem    Tablet 60 milliGRAM(s) Oral every 6 hours  lisinopril 5 milliGRAM(s) Oral daily  metoprolol     tartrate 25 milliGRAM(s) Oral every 8 hours  metoprolol    tartrate Injectable 5 milliGRAM(s) IV Push every 6 hours PRN  tamsulosin 0.4 milliGRAM(s) Oral at bedtime  ALBUTerol/ipratropium for Nebulization 3 milliLiter(s) Nebulizer every 6 hours  acetaminophen    Suspension 650 milliGRAM(s) Oral every 6 hours PRN  fentaNYL    Injectable 25 MICROGram(s) IV Push every 4 hours PRN  haloperidol    Injectable 2 milliGRAM(s) IV Push every 6 hours PRN  OLANZapine 2.5 milliGRAM(s) Oral daily  OLANZapine 5 milliGRAM(s) Oral at bedtime  enoxaparin Injectable 40 milliGRAM(s) SubCutaneous every 24 hours  bisacodyl Suppository 10 milliGRAM(s) Rectal daily PRN  lactulose Syrup 10 Gram(s) Oral daily  pantoprazole  Injectable 40 milliGRAM(s) IV Push daily  folic acid 1 milliGRAM(s) Oral daily  multivitamin 1 Tablet(s) Oral daily  influenza   Vaccine 0.5 milliLiter(s) IntraMuscular once  chlorhexidine 0.12% Liquid 15 milliLiter(s) Swish and Spit two times a day  nicotine - 21 mG/24Hr(s) Patch 1 patch Transdermal daily      Mode: CPAP with PS  FiO2: 40  PEEP: 5  PS: 15  MAP: 11      ICU Vital Signs Last 24 Hrs  T(C): 36.9 (24 Nov 2017 15:22), Max: 38.6 (23 Nov 2017 23:00)  T(F): 98.4 (24 Nov 2017 15:22), Max: 101.5 (23 Nov 2017 23:00)  HR: 112 (24 Nov 2017 17:08) (84 - 139)  BP: 113/515 (24 Nov 2017 14:00) (79/48 - 128/58)  BP(mean): 597 (24 Nov 2017 14:00) (59 - 597)  ABP: --  ABP(mean): --  RR: 34 (24 Nov 2017 14:00) (6 - 34)  SpO2: 100% (24 Nov 2017 17:08) (98% - 100%)          I&O's Detail    23 Nov 2017 07:01  -  24 Nov 2017 07:00  --------------------------------------------------------  IN:    Enteral Tube Flush: 250 mL    Free Water: 500 mL    Glucerna: 1440 mL  Total IN: 2190 mL    OUT:    Incontinent per Condom Catheter: 900 mL  Total OUT: 900 mL    Total NET: 1290 mL      24 Nov 2017 07:01  -  24 Nov 2017 17:51  --------------------------------------------------------  IN:    Free Water: 250 mL    Glucerna: 300 mL  Total IN: 550 mL    OUT:    Incontinent per Condom Catheter: 450 mL  Total OUT: 450 mL    Total NET: 100 mL            LABS:                        10.1   9.8   )-----------( 258      ( 24 Nov 2017 05:40 )             31.1     11-24    140  |  102  |  20.0  ----------------------------<  117<H>  4.2   |  27.0  |  0.65    Ca    8.9      24 Nov 2017 05:40  Phos  3.5     11-24  Mg     2.1     11-24            CAPILLARY BLOOD GLUCOSE            CULTURES:  Culture Results:   Numerous Pseudomonas aeruginosa  Moderate Routine respiratory raymundo present (11-18-17 @ 15:12)      Physical Examination:    General: No acute distress.  Alert, oriented, interactive, nonfocal    HEENT: Pupils equal, reactive to light.  Symmetric.    PULM: Clear to auscultation bilaterally, no significant sputum production    CVS: Regular rate and rhythm, no murmurs, rubs, or gallops    ABD: Soft, nondistended, nontender, normoactive bowel sounds, no masses    EXT: No edema, nontender    SKIN: Warm and well perfused, no rashes noted.    NEURO: A&Ox3, strength 5/5 all extremities, cranial nerves grossly intact, no focal deficits    RADIOLOGY: ***    CRITICAL CARE TIME SPENT: ***  Evaluating/treating patient, reviewing data/labs/imaging, discussing case with multidisciplinary team, discussing plan/goals of care with patient/family. Non-inclusive of procedure time. Patient is a 62y old  Male who presents with a chief complaint of Shortness of breath (30 Oct 2017 15:03)      BRIEF HOSPITAL COURSE: 61 y/o M with a h/o COPD, a-fib (no a/c), CHF, alcoholic cirrhosis, EtOH abuse, EtOH withdrawal (intubated in past for airway protection), initially admitted on 10/28 for COPD exacerbation and alcohol intoxication, signed out AMA and was found later on in hospital disoriented. Readmitted and RRT called later on when patient became tremulous, agitated, delirious, and began hallucinating. Transferred to MICU for further management with benzos and precedex gtt. Patient became obtunded, lost ability to protect airway, and was subsequently intubated. As per report, patient is highly sensitive to benzodiazepines.  CT head neg for acute events. Course complicated by a-fib with RVR, sepsis, pseudomonas aspiration pneumonia. Self extubated on 11/2 without need for reintubation initially but on the morning of 11/4 patient obtunded  with difficulty managing secretions. Reintubated for airway protection. Pt has had recurrent ruptured  balloons and ETT breakage.  He had failed his weaning trials and had a tracheostomy placed on Thurs. 11/9.  Brought back to the ICU secondary to for fever and poorly controlled AF       Events last 24 hours: Patient failed SBT CPAP 15/5.  BP had periods of softening but patient remained HD stable with MAP > 65, will continue ICU monitoring     PAST MEDICAL & SURGICAL HISTORY:  Falls  Meningitis  Collapsed lung  Alcohol withdrawal  Emphysema of lung  ETOH abuse  Cirrhosis  Afib  CHF (congestive heart failure)  Poor historian  Alcohol abuse  Chronic atrial fibrillation  S/P BKA (below knee amputation), left: secondary to worsening infection in lower leg. Had both ankle injuries from fall s/p repair - left had complication.  S/P BKA (below knee amputation) unilateral, left      Review of Systems: unable to obtain     Medications:    diltiazem    Tablet 60 milliGRAM(s) Oral every 6 hours  lisinopril 5 milliGRAM(s) Oral daily  metoprolol     tartrate 25 milliGRAM(s) Oral every 8 hours  metoprolol    tartrate Injectable 5 milliGRAM(s) IV Push every 6 hours PRN  tamsulosin 0.4 milliGRAM(s) Oral at bedtime  ALBUTerol/ipratropium for Nebulization 3 milliLiter(s) Nebulizer every 6 hours  acetaminophen    Suspension 650 milliGRAM(s) Oral every 6 hours PRN  fentaNYL    Injectable 25 MICROGram(s) IV Push every 4 hours PRN  haloperidol    Injectable 2 milliGRAM(s) IV Push every 6 hours PRN  OLANZapine 2.5 milliGRAM(s) Oral daily  OLANZapine 5 milliGRAM(s) Oral at bedtime  enoxaparin Injectable 40 milliGRAM(s) SubCutaneous every 24 hours  bisacodyl Suppository 10 milliGRAM(s) Rectal daily PRN  lactulose Syrup 10 Gram(s) Oral daily  pantoprazole  Injectable 40 milliGRAM(s) IV Push daily  folic acid 1 milliGRAM(s) Oral daily  multivitamin 1 Tablet(s) Oral daily  influenza   Vaccine 0.5 milliLiter(s) IntraMuscular once  chlorhexidine 0.12% Liquid 15 milliLiter(s) Swish and Spit two times a day  nicotine - 21 mG/24Hr(s) Patch 1 patch Transdermal daily      Mode: CPAP with PS  FiO2: 40  PEEP: 5  PS: 15  MAP: 11      ICU Vital Signs Last 24 Hrs  T(C): 36.9 (24 Nov 2017 15:22), Max: 38.6 (23 Nov 2017 23:00)  T(F): 98.4 (24 Nov 2017 15:22), Max: 101.5 (23 Nov 2017 23:00)  HR: 112 (24 Nov 2017 17:08) (84 - 139)  BP: 113/515 (24 Nov 2017 14:00) (79/48 - 128/58)  BP(mean): 597 (24 Nov 2017 14:00) (59 - 597)  ABP: --  ABP(mean): --  RR: 34 (24 Nov 2017 14:00) (6 - 34)  SpO2: 100% (24 Nov 2017 17:08) (98% - 100%)          I&O's Detail    23 Nov 2017 07:01  -  24 Nov 2017 07:00  --------------------------------------------------------  IN:    Enteral Tube Flush: 250 mL    Free Water: 500 mL    Glucerna: 1440 mL  Total IN: 2190 mL    OUT:    Incontinent per Condom Catheter: 900 mL  Total OUT: 900 mL    Total NET: 1290 mL      24 Nov 2017 07:01  -  24 Nov 2017 17:51  --------------------------------------------------------  IN:    Free Water: 250 mL    Glucerna: 300 mL  Total IN: 550 mL    OUT:    Incontinent per Condom Catheter: 450 mL  Total OUT: 450 mL    Total NET: 100 mL            LABS:                        10.1   9.8   )-----------( 258      ( 24 Nov 2017 05:40 )             31.1     11-24    140  |  102  |  20.0  ----------------------------<  117<H>  4.2   |  27.0  |  0.65    Ca    8.9      24 Nov 2017 05:40  Phos  3.5     11-24  Mg     2.1     11-24            CAPILLARY BLOOD GLUCOSE            CULTURES:  Culture Results:   Numerous Pseudomonas aeruginosa  Moderate Routine respiratory raymundo present (11-18-17 @ 15:12)      Physical Examination:    General: No acute distress.  Alert, non responsive but calm      HEENT: Pupils equal, reactive to light.  Symmetric.    PULM: Clear to auscultation bilaterally, no significant sputum production    CVS: Irregular rhythm in 70-90s +s1, S2    ABD: Soft, nondistended, nontender, normoactive bowel sounds. PEG site clean with out erythema or discharge. C/D/I     EXT: No edema, nontender. left BKA    SKIN: Warm and well perfused, no rashes noted.          CRITICAL CARE TIME SPENT: 30 min   Evaluating/treating patient, reviewing data/labs/imaging, discussing case with multidisciplinary team, discussing plan/goals of care with patient/family. Non-inclusive of procedure time.

## 2017-11-24 NOTE — PROGRESS NOTE ADULT - ASSESSMENT
successful Re-insertion of PEG.  Site clean/ dry.  Persistent Fever, unclear etiology. Not from PEG site.  Further eval or Fever per ICU staff.   For routine nursing care of the PEG tube.  Keep site Clean/ dry.  Abdominal binder in place.    Signing off;  Reconsult GI PRN.

## 2017-11-24 NOTE — PROGRESS NOTE ADULT - ATTENDING COMMENTS
Pt seen and examined with CCM PA on rounds today, agree with above assessment and plan. Lopressor dose changed to 25mg q8 as BP has been low. HR remains low 100's. May benefit from Digoxin will start today, check level in 4 days.

## 2017-11-25 LAB
ANION GAP SERPL CALC-SCNC: 11 MMOL/L — SIGNIFICANT CHANGE UP (ref 5–17)
BUN SERPL-MCNC: 17 MG/DL — SIGNIFICANT CHANGE UP (ref 8–20)
CALCIUM SERPL-MCNC: 9 MG/DL — SIGNIFICANT CHANGE UP (ref 8.6–10.2)
CHLORIDE SERPL-SCNC: 105 MMOL/L — SIGNIFICANT CHANGE UP (ref 98–107)
CO2 SERPL-SCNC: 26 MMOL/L — SIGNIFICANT CHANGE UP (ref 22–29)
CREAT SERPL-MCNC: 0.47 MG/DL — LOW (ref 0.5–1.3)
GLUCOSE SERPL-MCNC: 112 MG/DL — SIGNIFICANT CHANGE UP (ref 70–115)
HCT VFR BLD CALC: 31.6 % — LOW (ref 42–52)
HGB BLD-MCNC: 10 G/DL — LOW (ref 14–18)
MAGNESIUM SERPL-MCNC: 2 MG/DL — SIGNIFICANT CHANGE UP (ref 1.6–2.6)
MCHC RBC-ENTMCNC: 29.2 PG — SIGNIFICANT CHANGE UP (ref 27–31)
MCHC RBC-ENTMCNC: 31.6 G/DL — LOW (ref 32–36)
MCV RBC AUTO: 92.1 FL — SIGNIFICANT CHANGE UP (ref 80–94)
PHOSPHATE SERPL-MCNC: 4.1 MG/DL — SIGNIFICANT CHANGE UP (ref 2.4–4.7)
PLATELET # BLD AUTO: 261 K/UL — SIGNIFICANT CHANGE UP (ref 150–400)
POTASSIUM SERPL-MCNC: 4.5 MMOL/L — SIGNIFICANT CHANGE UP (ref 3.5–5.3)
POTASSIUM SERPL-SCNC: 4.5 MMOL/L — SIGNIFICANT CHANGE UP (ref 3.5–5.3)
RBC # BLD: 3.43 M/UL — LOW (ref 4.6–6.2)
RBC # FLD: 16.3 % — HIGH (ref 11–15.6)
SODIUM SERPL-SCNC: 142 MMOL/L — SIGNIFICANT CHANGE UP (ref 135–145)
WBC # BLD: 7.7 K/UL — SIGNIFICANT CHANGE UP (ref 4.8–10.8)
WBC # FLD AUTO: 7.7 K/UL — SIGNIFICANT CHANGE UP (ref 4.8–10.8)

## 2017-11-25 PROCEDURE — 99233 SBSQ HOSP IP/OBS HIGH 50: CPT

## 2017-11-25 RX ADMIN — Medication 0.25 MILLIGRAM(S): at 10:28

## 2017-11-25 RX ADMIN — Medication 3 MILLILITER(S): at 04:15

## 2017-11-25 RX ADMIN — LACTULOSE 10 GRAM(S): 10 SOLUTION ORAL at 11:11

## 2017-11-25 RX ADMIN — ENOXAPARIN SODIUM 40 MILLIGRAM(S): 100 INJECTION SUBCUTANEOUS at 11:36

## 2017-11-25 RX ADMIN — FENTANYL CITRATE 25 MICROGRAM(S): 50 INJECTION INTRAVENOUS at 22:02

## 2017-11-25 RX ADMIN — FENTANYL CITRATE 25 MICROGRAM(S): 50 INJECTION INTRAVENOUS at 22:36

## 2017-11-25 RX ADMIN — TAMSULOSIN HYDROCHLORIDE 0.4 MILLIGRAM(S): 0.4 CAPSULE ORAL at 21:40

## 2017-11-25 RX ADMIN — OLANZAPINE 5 MILLIGRAM(S): 15 TABLET, FILM COATED ORAL at 21:40

## 2017-11-25 RX ADMIN — FENTANYL CITRATE 25 MICROGRAM(S): 50 INJECTION INTRAVENOUS at 16:15

## 2017-11-25 RX ADMIN — Medication 1 MILLIGRAM(S): at 11:13

## 2017-11-25 RX ADMIN — HALOPERIDOL DECANOATE 2 MILLIGRAM(S): 100 INJECTION INTRAMUSCULAR at 18:52

## 2017-11-25 RX ADMIN — Medication 1 PATCH: at 10:30

## 2017-11-25 RX ADMIN — FENTANYL CITRATE 25 MICROGRAM(S): 50 INJECTION INTRAVENOUS at 16:30

## 2017-11-25 RX ADMIN — Medication 25 MILLIGRAM(S): at 21:40

## 2017-11-25 RX ADMIN — Medication 3 MILLILITER(S): at 14:48

## 2017-11-25 RX ADMIN — CHLORHEXIDINE GLUCONATE 15 MILLILITER(S): 213 SOLUTION TOPICAL at 05:48

## 2017-11-25 RX ADMIN — FENTANYL CITRATE 25 MICROGRAM(S): 50 INJECTION INTRAVENOUS at 11:25

## 2017-11-25 RX ADMIN — LISINOPRIL 5 MILLIGRAM(S): 2.5 TABLET ORAL at 05:48

## 2017-11-25 RX ADMIN — Medication 3 MILLILITER(S): at 20:32

## 2017-11-25 RX ADMIN — PANTOPRAZOLE SODIUM 40 MILLIGRAM(S): 20 TABLET, DELAYED RELEASE ORAL at 11:12

## 2017-11-25 RX ADMIN — Medication 1 TABLET(S): at 11:11

## 2017-11-25 RX ADMIN — OLANZAPINE 2.5 MILLIGRAM(S): 15 TABLET, FILM COATED ORAL at 11:12

## 2017-11-25 RX ADMIN — Medication 3 MILLILITER(S): at 08:34

## 2017-11-25 RX ADMIN — Medication 1 PATCH: at 11:12

## 2017-11-25 RX ADMIN — Medication 25 MILLIGRAM(S): at 13:26

## 2017-11-25 RX ADMIN — CHLORHEXIDINE GLUCONATE 15 MILLILITER(S): 213 SOLUTION TOPICAL at 17:01

## 2017-11-25 RX ADMIN — HALOPERIDOL DECANOATE 2 MILLIGRAM(S): 100 INJECTION INTRAMUSCULAR at 10:25

## 2017-11-25 RX ADMIN — Medication 0.25 MILLIGRAM(S): at 02:14

## 2017-11-25 RX ADMIN — Medication 25 MILLIGRAM(S): at 05:48

## 2017-11-25 RX ADMIN — FENTANYL CITRATE 25 MICROGRAM(S): 50 INJECTION INTRAVENOUS at 11:09

## 2017-11-25 NOTE — PROGRESS NOTE ADULT - SUBJECTIVE AND OBJECTIVE BOX
Patient is a 62y old  Male who presents with a chief complaint of Shortness of breath (30 Oct 2017 15:03)      BRIEF HOSPITAL COURSE: 61 yo male, PMHx COPD, AFib not on AC, CHF, alcoholic cirrhosis, EtOH abuse, EtOH withdrawal (intubated in past for airway protection), initially admitted on 10/28 for COPD exacerbation and alcohol intoxication, signed out AMA and was found later in hospital disoriented. Readmitted and RRT called for development of acute EtOH withdrawal. Transferred to MICU for further management of DTs. Patient subsequently became obtunded, lost ability to protect airway, and was intubated. CT head neg for acute events. Hospital course complicated by AFib with RVR, sepsis, Pseudomonas PNA, aspiration pneumonia. Self extubated on 11/2 without need for reintubation initially, but on the morning of 11/4 patient became obtunded with difficulty managing secretions requiring reintubation. Pt has had recurrent ruptured  balloons and ETT breakage, with multiple failed weaning trials. S/p tracheostomy 11/9 and PEG 11/22. Readmitted to the ICU secondary to for fever r/o sepsis and recurrent AFib with RVR.    Events last 24 hours: Afebrile overnight. Periods of AFib with 's, but improved without further intervention. Digoxin loaded, HR now 90's.    PAST MEDICAL & SURGICAL HISTORY:  Falls  Meningitis  Collapsed lung  Alcohol withdrawal  Emphysema of lung  ETOH abuse  Cirrhosis  Afib  CHF (congestive heart failure)  Poor historian  Alcohol abuse  Chronic atrial fibrillation  S/P BKA (below knee amputation), left: secondary to worsening infection in lower leg. Had both ankle injuries from fall s/p repair - left had complication.  S/P BKA (below knee amputation) unilateral, left      Review of Systems: unable to obtain full ROS due to trach      Medications:  digoxin  Injectable 0.25 milliGRAM(s) IV Push every 8 hours  diltiazem    Tablet 60 milliGRAM(s) Oral every 6 hours  lisinopril 5 milliGRAM(s) Oral daily  metoprolol     tartrate 25 milliGRAM(s) Oral every 8 hours  metoprolol    tartrate Injectable 5 milliGRAM(s) IV Push every 6 hours PRN  tamsulosin 0.4 milliGRAM(s) Oral at bedtime  ALBUTerol/ipratropium for Nebulization 3 milliLiter(s) Nebulizer every 6 hours  acetaminophen    Suspension 650 milliGRAM(s) Oral every 6 hours PRN  fentaNYL    Injectable 25 MICROGram(s) IV Push every 4 hours PRN  haloperidol    Injectable 2 milliGRAM(s) IV Push every 6 hours PRN  OLANZapine 2.5 milliGRAM(s) Oral daily  OLANZapine 5 milliGRAM(s) Oral at bedtime  enoxaparin Injectable 40 milliGRAM(s) SubCutaneous every 24 hours  bisacodyl Suppository 10 milliGRAM(s) Rectal daily PRN  lactulose Syrup 10 Gram(s) Oral daily  pantoprazole  Injectable 40 milliGRAM(s) IV Push daily  folic acid 1 milliGRAM(s) Oral daily  multivitamin 1 Tablet(s) Oral daily  influenza   Vaccine 0.5 milliLiter(s) IntraMuscular once  chlorhexidine 0.12% Liquid 15 milliLiter(s) Swish and Spit two times a day  nicotine - 21 mG/24Hr(s) Patch 1 patch Transdermal daily      Mode: AC/ CMV (Assist Control/ Continuous Mandatory Ventilation)  FiO2: 40  PEEP: 5  PS: 15  MAP: 9      ICU Vital Signs Last 24 Hrs  T(C): 37.1 (24 Nov 2017 23:27), Max: 37.3 (24 Nov 2017 21:48)  T(F): 98.8 (24 Nov 2017 23:27), Max: 99.1 (24 Nov 2017 21:48)  HR: 86 (25 Nov 2017 01:00) (84 - 133)  BP: 99/68 (25 Nov 2017 01:00) (79/48 - 134/90)  BP(mean): 80 (25 Nov 2017 01:00) (59 - 597)  ABP: --  ABP(mean): --  RR: 14 (25 Nov 2017 01:00) (6 - 34)  SpO2: 100% (25 Nov 2017 01:00) (97% - 100%)          I&O's Detail    23 Nov 2017 07:01  -  24 Nov 2017 07:00  --------------------------------------------------------  IN:    Enteral Tube Flush: 250 mL    Free Water: 500 mL    Glucerna: 1440 mL  Total IN: 2190 mL    OUT:    Incontinent per Condom Catheter: 900 mL  Total OUT: 900 mL    Total NET: 1290 mL      24 Nov 2017 07:01  -  25 Nov 2017 02:12  --------------------------------------------------------  IN:    Enteral Tube Flush: 100 mL    Free Water: 750 mL    Glucerna: 1140 mL  Total IN: 1990 mL    OUT:    Incontinent per Condom Catheter: 1600 mL  Total OUT: 1600 mL    Total NET: 390 mL            LABS:                        10.1   9.8   )-----------( 258      ( 24 Nov 2017 05:40 )             31.1     11-24    140  |  102  |  20.0  ----------------------------<  117<H>  4.2   |  27.0  |  0.65    Ca    8.9      24 Nov 2017 05:40  Phos  3.5     11-24  Mg     2.1     11-24            CAPILLARY BLOOD GLUCOSE      CULTURES:  Culture Results:   Numerous Pseudomonas aeruginosa  Moderate Routine respiratory raymundo present (11-18 @ 15:12)      Physical Examination:    General: No acute distress.      HEENT: Pupils 3mm equal, sluggish reactive to light. Symmetric.    PULM: Trach to vent. Diminished to auscultation bilaterally, no respiratory distress    CVS: AFib regular rate, no murmurs, rubs, or gallops    ABD: Soft, nondistended, nontender, normoactive bowel sounds, no masses. PEG in place.    EXT: L BKA. No edema, nontender    SKIN: Warm and well perfused, no rashes noted.    NEURO: RASS 0, awake and alert, attempts to communicate verbally    RADIOLOGY: none recent    CRITICAL CARE TIME SPENT: I have spent 30 minutes of critical care time evaluating and treating this patient, including reviewing charts, labs, imagining studies, and collaborating with interdisciplinary team.

## 2017-11-25 NOTE — PROGRESS NOTE ADULT - ATTENDING COMMENTS
Pt requires ICU for telemetry monitoring for AF on chronic vent support with weaning as tolerated. Pt's daughters approached regarding GOC and will continue to discuss.

## 2017-11-25 NOTE — PROGRESS NOTE ADULT - ASSESSMENT
63 yo male, PMHx COPD, AFib not on AC, CHF, alcoholic cirrhosis, EtOH abuse, EtOH withdrawal (intubated in past for airway protection), initially admitted on 10/28 for COPD exacerbation and alcohol intoxication, delirium tremens, AFib with RVR, Pseudomonas and aspiration PNA, acute respiratory failure now chronic vent-dependent respiratory failure s/p trach and PEG    PLAN:  RESPIRATORY FAILURE: Patient currently on full ventilatory support. Will titrate vent settings to maintain SaO2 >90%, or pH >7.25. Consider low tidal volume ventilation strategy w/ goal Tv 4-6 cc/kg of ideal body weight and plateau pressure goal < 30. VAP prophylaxis with Peridex oral care. Aggressive chest PT and suctioning. Continue daily CPAP trials as tolerated.    AFIB: Rate controlled on current regimen (Lopressor and Cardizem). Receiving digoxin load. Continue to treat PRN with Lopressor IVP for HR > 140    ALTERED MENTAL STATUS: Possible encephalopathy related to long-standing EtOH abuse vs component of delirium from prolonged hospitalization. Waxes and wanes, continue to monitor. 1:1 constant observation for pt safety.    FEVER OF UNKNOWN ORIGIN: Completed course of antibiotics, blood cultures thus far negative. Sputum culture with numerous GNR, however likely related to chronic ventilator-associated colonization, pending speciation. No other clear focus of infection exists at this time, ?consider indium scan. Afebrile x 24h. Observe off antibiotics for now.

## 2017-11-25 NOTE — PROGRESS NOTE ADULT - SUBJECTIVE AND OBJECTIVE BOX
Patient is a 62y old  Male who presents with a chief complaint of Shortness of breath (30 Oct 2017 15:03)      BRIEF HOSPITAL COURSE: 61 yo male, PMHx COPD, AFib not on AC, CHF, alcoholic cirrhosis, EtOH abuse, EtOH withdrawal (intubated in past for airway protection), initially admitted on 10/28 for COPD exacerbation and alcohol intoxication, signed out AMA and was found later in hospital disoriented. Readmitted and RRT called for development of acute EtOH withdrawal. Transferred to MICU for further management of DTs. Patient subsequently became obtunded, lost ability to protect airway, and was intubated. CT head neg for acute events. Hospital course complicated by AFib with RVR, sepsis, Pseudomonas PNA, aspiration pneumonia. Self extubated on 11/2 without need for reintubation initially, but on the morning of 11/4 patient became obtunded with difficulty managing secretions requiring reintubation. Pt has had recurrent ruptured  balloons and ETT breakage, with multiple failed weaning trials. S/p tracheostomy 11/9 and PEG 11/22. Readmitted to the ICU secondary to for fever r/o sepsis and recurrent AFib with RVR.    Events last 24 hours: periods of low BP and high HR    PAST MEDICAL & SURGICAL HISTORY:  Falls  Meningitis  Collapsed lung  Alcohol withdrawal  Emphysema of lung  ETOH abuse  Cirrhosis  Afib  CHF (congestive heart failure)  Poor historian  Alcohol abuse  Chronic atrial fibrillation  S/P BKA (below knee amputation), left: secondary to worsening infection in lower leg. Had both ankle injuries from fall s/p repair - left had complication.  S/P BKA (below knee amputation) unilateral, left      Review of Systems:  Cannot be obtained as pt is trached     Medications:    diltiazem    Tablet 60 milliGRAM(s) Oral every 6 hours  lisinopril 5 milliGRAM(s) Oral daily  metoprolol     tartrate 25 milliGRAM(s) Oral every 8 hours  metoprolol    tartrate Injectable 5 milliGRAM(s) IV Push every 6 hours PRN  tamsulosin 0.4 milliGRAM(s) Oral at bedtime    ALBUTerol/ipratropium for Nebulization 3 milliLiter(s) Nebulizer every 6 hours    acetaminophen    Suspension 650 milliGRAM(s) Oral every 6 hours PRN  fentaNYL    Injectable 25 MICROGram(s) IV Push every 4 hours PRN  haloperidol    Injectable 2 milliGRAM(s) IV Push every 6 hours PRN  OLANZapine 2.5 milliGRAM(s) Oral daily  OLANZapine 5 milliGRAM(s) Oral at bedtime      enoxaparin Injectable 40 milliGRAM(s) SubCutaneous every 24 hours    bisacodyl Suppository 10 milliGRAM(s) Rectal daily PRN  lactulose Syrup 10 Gram(s) Oral daily  pantoprazole  Injectable 40 milliGRAM(s) IV Push daily        folic acid 1 milliGRAM(s) Oral daily  multivitamin 1 Tablet(s) Oral daily    influenza   Vaccine 0.5 milliLiter(s) IntraMuscular once    chlorhexidine 0.12% Liquid 15 milliLiter(s) Swish and Spit two times a day    nicotine - 21 mG/24Hr(s) Patch 1 patch Transdermal daily      Mode: AC/ CMV (Assist Control/ Continuous Mandatory Ventilation)  RR (machine): 14  TV (machine): 480  FiO2: 30  PEEP: 5  MAP: 10  PIP: 28      ICU Vital Signs Last 24 Hrs  T(C): 37.7 (25 Nov 2017 16:24), Max: 37.7 (25 Nov 2017 16:24)  T(F): 99.8 (25 Nov 2017 16:24), Max: 99.8 (25 Nov 2017 16:24)  HR: 68 (25 Nov 2017 18:00) (58 - 133)  BP: 117/58 (25 Nov 2017 18:00) (69/36 - 134/90)  BP(mean): 79 (25 Nov 2017 18:00) (48 - 102)  ABP: --  ABP(mean): --  RR: 12 (25 Nov 2017 18:00) (12 - 38)  SpO2: 99% (25 Nov 2017 18:00) (97% - 100%)          I&O's Detail    24 Nov 2017 07:01  -  25 Nov 2017 07:00  --------------------------------------------------------  IN:    Enteral Tube Flush: 200 mL    Free Water: 1000 mL    Glucerna: 1440 mL  Total IN: 2640 mL    OUT:    Incontinent per Condom Catheter: 1950 mL  Total OUT: 1950 mL    Total NET: 690 mL      25 Nov 2017 07:01  -  25 Nov 2017 19:20  --------------------------------------------------------  IN:    Free Water: 720 mL    Glucerna: 660 mL    Solution: 1000 mL  Total IN: 2380 mL    OUT:    Incontinent per Condom Catheter: 750 mL  Total OUT: 750 mL    Total NET: 1630 mL            LABS:                        10.0   7.7   )-----------( 261      ( 25 Nov 2017 06:10 )             31.6     11-25    142  |  105  |  17.0  ----------------------------<  112  4.5   |  26.0  |  0.47<L>    Ca    9.0      25 Nov 2017 06:10  Phos  4.1     11-25  Mg     2.0     11-25            CAPILLARY BLOOD GLUCOSE            CULTURES:      Physical Examination:    General: No acute distress.      HEENT: Pupils equal, reactive to light.  Symmetric.    PULM: Clear to auscultation bilaterally, no significant sputum production    CVS: Irregular rhythm in 70-90s +s1, S2    ABD: Soft, nondistended, nontender, normoactive bowel sounds. PEG site clean with out erythema or discharge. C/D/I     EXT: No edema, nontender. left BKA    SKIN: Warm and well perfused, no rashes noted.    Neuro: awake, agitated at times, [place]     RADIOLOGY: ***    CRITICAL CARE TIME SPENT: ***

## 2017-11-25 NOTE — PROGRESS NOTE ADULT - PROBLEM SELECTOR PLAN 2
with periods of RVR  c/w rate and rhythm control agents  scaled back Lopressor as pt BP on the lower side  loaded with digoxin check level on Tuesday

## 2017-11-26 PROCEDURE — 99233 SBSQ HOSP IP/OBS HIGH 50: CPT

## 2017-11-26 RX ORDER — ALBUMIN HUMAN 25 %
100 VIAL (ML) INTRAVENOUS ONCE
Qty: 0 | Refills: 0 | Status: COMPLETED | OUTPATIENT
Start: 2017-11-26 | End: 2017-11-26

## 2017-11-26 RX ADMIN — Medication 25 MILLIGRAM(S): at 12:55

## 2017-11-26 RX ADMIN — Medication 1 TABLET(S): at 12:53

## 2017-11-26 RX ADMIN — Medication 3 MILLILITER(S): at 21:27

## 2017-11-26 RX ADMIN — CHLORHEXIDINE GLUCONATE 15 MILLILITER(S): 213 SOLUTION TOPICAL at 06:18

## 2017-11-26 RX ADMIN — CHLORHEXIDINE GLUCONATE 15 MILLILITER(S): 213 SOLUTION TOPICAL at 17:31

## 2017-11-26 RX ADMIN — OLANZAPINE 2.5 MILLIGRAM(S): 15 TABLET, FILM COATED ORAL at 12:54

## 2017-11-26 RX ADMIN — Medication 1 MILLIGRAM(S): at 12:54

## 2017-11-26 RX ADMIN — Medication 50 MILLILITER(S): at 10:17

## 2017-11-26 RX ADMIN — Medication 3 MILLILITER(S): at 03:38

## 2017-11-26 RX ADMIN — Medication 1 PATCH: at 12:54

## 2017-11-26 RX ADMIN — Medication 3 MILLILITER(S): at 08:22

## 2017-11-26 RX ADMIN — Medication 0.12 MILLIGRAM(S): at 06:21

## 2017-11-26 RX ADMIN — Medication 3 MILLILITER(S): at 15:36

## 2017-11-26 RX ADMIN — LISINOPRIL 5 MILLIGRAM(S): 2.5 TABLET ORAL at 06:18

## 2017-11-26 RX ADMIN — Medication 1 PATCH: at 12:53

## 2017-11-26 RX ADMIN — Medication 25 MILLIGRAM(S): at 06:19

## 2017-11-26 RX ADMIN — PANTOPRAZOLE SODIUM 40 MILLIGRAM(S): 20 TABLET, DELAYED RELEASE ORAL at 12:54

## 2017-11-26 RX ADMIN — LACTULOSE 10 GRAM(S): 10 SOLUTION ORAL at 12:53

## 2017-11-26 RX ADMIN — Medication 25 MILLIGRAM(S): at 21:18

## 2017-11-26 RX ADMIN — OLANZAPINE 5 MILLIGRAM(S): 15 TABLET, FILM COATED ORAL at 21:18

## 2017-11-26 RX ADMIN — ENOXAPARIN SODIUM 40 MILLIGRAM(S): 100 INJECTION SUBCUTANEOUS at 12:53

## 2017-11-26 RX ADMIN — FENTANYL CITRATE 25 MICROGRAM(S): 50 INJECTION INTRAVENOUS at 05:15

## 2017-11-26 RX ADMIN — HALOPERIDOL DECANOATE 2 MILLIGRAM(S): 100 INJECTION INTRAMUSCULAR at 00:55

## 2017-11-26 RX ADMIN — TAMSULOSIN HYDROCHLORIDE 0.4 MILLIGRAM(S): 0.4 CAPSULE ORAL at 21:18

## 2017-11-26 RX ADMIN — FENTANYL CITRATE 25 MICROGRAM(S): 50 INJECTION INTRAVENOUS at 04:46

## 2017-11-26 NOTE — PROGRESS NOTE ADULT - ASSESSMENT
63 yo male, PMHx COPD, AFib not on AC, CHF, alcoholic cirrhosis, EtOH abuse, EtOH withdrawal (intubated in past for airway protection), initially admitted on 10/28 for COPD exacerbation and alcohol intoxication, delirium tremens, AFib with RVR, Pseudomonas and aspiration PNA, acute respiratory failure now chronic vent-dependent respiratory failure s/p trach and PEG    PLAN:  RESPIRATORY FAILURE: Patient currently on full ventilatory support. Will titrate vent settings to maintain SaO2 >90%, or pH >7.25. Consider low tidal volume ventilation strategy w/ goal Tv 4-6 cc/kg of ideal body weight and plateau pressure goal < 30. VAP prophylaxis with Peridex oral care. Aggressive chest PT and suctioning. Continue daily CPAP trials as tolerated. Will need planning for long term vent care facility.     AFIB: Rate controlled on current regimen (Lopressor and Cardizem). S/p Digoxin load. Continue to treat PRN with Lopressor IVP for HR > 140    ALTERED MENTAL STATUS: Possible encephalopathy related to long-standing EtOH abuse vs component of delirium from prolonged hospitalization. Waxes and wanes, continue to monitor. 1:1 constant observation for pt safety.    FEVER OF UNKNOWN ORIGIN: Completed course of antibiotics, blood cultures thus far negative. Sputum culture with numerous GNR, however likely related to chronic ventilator-associated colonization, pending speciation. No other clear focus of infection exists at this time, ?consider indium scan. Afebrile. Observe off antibiotics for now.     Maintain ICU level of care for cardiac monitoring with interventions for intermittent AFib with RVR as well as ventilator titration.

## 2017-11-26 NOTE — PROGRESS NOTE ADULT - SUBJECTIVE AND OBJECTIVE BOX
Patient is a 62y old  Male who presents with a chief complaint of Shortness of breath (30 Oct 2017 15:03)      BRIEF HOSPITAL COURSE: ***    Events last 24 hours: BP soft this am, meds held, albumin bolus given      PAST MEDICAL & SURGICAL HISTORY:  Falls  Meningitis  Collapsed lung  Alcohol withdrawal  Emphysema of lung  ETOH abuse  Cirrhosis  Afib  CHF (congestive heart failure)  Poor historian  Alcohol abuse  Chronic atrial fibrillation  S/P BKA (below knee amputation), left: secondary to worsening infection in lower leg. Had both ankle injuries from fall s/p repair - left had complication.  S/P BKA (below knee amputation) unilateral, left      Review of Systems:  CONSTITUTIONAL: No fever, chills, or fatigue  EYES: No eye pain, visual disturbances, or discharge  ENMT:  No difficulty hearing, tinnitus, vertigo; No sinus or throat pain  NECK: No pain or stiffness  RESPIRATORY: No cough, wheezing, chills or hemoptysis; No shortness of breath  CARDIOVASCULAR: No chest pain, palpitations, dizziness, or leg swelling  GASTROINTESTINAL: No abdominal or epigastric pain. No nausea, vomiting, or hematemesis; No diarrhea or constipation. No melena or hematochezia.  GENITOURINARY: No dysuria, frequency, hematuria, or incontinence  NEUROLOGICAL: No headaches, memory loss, loss of strength, numbness, or tremors  SKIN: No itching, burning, rashes, or lesions   MUSCULOSKELETAL: No joint pain or swelling; No muscle, back, or extremity pain  PSYCHIATRIC: No depression, anxiety, mood swings, or difficulty sleeping      Medications:    digoxin     Tablet 0.125 milliGRAM(s) Oral daily  diltiazem    Tablet 60 milliGRAM(s) Oral every 6 hours  lisinopril 5 milliGRAM(s) Oral daily  metoprolol     tartrate 25 milliGRAM(s) Oral every 8 hours  metoprolol    tartrate Injectable 5 milliGRAM(s) IV Push every 6 hours PRN  tamsulosin 0.4 milliGRAM(s) Oral at bedtime    ALBUTerol/ipratropium for Nebulization 3 milliLiter(s) Nebulizer every 6 hours    acetaminophen    Suspension 650 milliGRAM(s) Oral every 6 hours PRN  fentaNYL    Injectable 25 MICROGram(s) IV Push every 4 hours PRN  haloperidol    Injectable 2 milliGRAM(s) IV Push every 6 hours PRN  OLANZapine 2.5 milliGRAM(s) Oral daily  OLANZapine 5 milliGRAM(s) Oral at bedtime      enoxaparin Injectable 40 milliGRAM(s) SubCutaneous every 24 hours    bisacodyl Suppository 10 milliGRAM(s) Rectal daily PRN  lactulose Syrup 10 Gram(s) Oral daily  pantoprazole  Injectable 40 milliGRAM(s) IV Push daily        albumin human 25% IVPB 100 milliLiter(s) IV Intermittent once  folic acid 1 milliGRAM(s) Oral daily  multivitamin 1 Tablet(s) Oral daily    influenza   Vaccine 0.5 milliLiter(s) IntraMuscular once    chlorhexidine 0.12% Liquid 15 milliLiter(s) Swish and Spit two times a day    nicotine - 21 mG/24Hr(s) Patch 1 patch Transdermal daily      Mode: CPAP with PS  FiO2: 30  PEEP: 5  MAP: 9      ICU Vital Signs Last 24 Hrs  T(C): 36.9 (26 Nov 2017 07:00), Max: 37.7 (25 Nov 2017 16:24)  T(F): 98.5 (26 Nov 2017 07:00), Max: 99.8 (25 Nov 2017 16:24)  HR: 74 (26 Nov 2017 09:00) (58 - 101)  BP: 95/51 (26 Nov 2017 09:00) (69/36 - 153/66)  BP(mean): 67 (26 Nov 2017 09:00) (48 - 95)  ABP: --  ABP(mean): --  RR: 17 (26 Nov 2017 09:00) (12 - 37)  SpO2: 97% (26 Nov 2017 09:00) (96% - 99%)          I&O's Detail    25 Nov 2017 07:01  -  26 Nov 2017 07:00  --------------------------------------------------------  IN:    Enteral Tube Flush: 180 mL    Free Water: 1170 mL    Glucerna: 1320 mL    Solution: 1000 mL  Total IN: 3670 mL    OUT:    Incontinent per Condom Catheter: 1725 mL  Total OUT: 1725 mL    Total NET: 1945 mL      26 Nov 2017 07:01  -  26 Nov 2017 09:54  --------------------------------------------------------  IN:    Glucerna: 120 mL  Total IN: 120 mL    OUT:    Incontinent per Condom Catheter: 130 mL    Voided: 160 mL  Total OUT: 290 mL    Total NET: -170 mL            LABS:                        10.0   7.7   )-----------( 261      ( 25 Nov 2017 06:10 )             31.6     11-25    142  |  105  |  17.0  ----------------------------<  112  4.5   |  26.0  |  0.47<L>    Ca    9.0      25 Nov 2017 06:10  Phos  4.1     11-25  Mg     2.0     11-25            CAPILLARY BLOOD GLUCOSE            CULTURES:      Physical Examination:    General: No acute distress.      HEENT: Pupils equal, reactive to light.  Symmetric.    PULM: Clear to auscultation bilaterally, no significant sputum production    CVS: Regular rate and rhythm, no murmurs, rubs, or gallops    ABD: Soft, nondistended, nontender, normoactive bowel sounds, no masses    EXT: No edema, nontender    SKIN: Warm and well perfused, no rashes noted.    NEURO: Alert, oriented, interactive, nonfocal    RADIOLOGY: ***    CRITICAL CARE TIME SPENT: *** Patient is a 62y old  Male who presents with a chief complaint of Shortness of breath (30 Oct 2017 15:03)      BRIEF HOSPITAL COURSE: 63 yo male, PMHx COPD, AFib not on AC, CHF, alcoholic cirrhosis, EtOH abuse, EtOH withdrawal (intubated in past for airway protection), initially admitted on 10/28 for COPD exacerbation and alcohol intoxication, signed out AMA and was found later in hospital disoriented. Readmitted and RRT called for development of acute EtOH withdrawal. Transferred to MICU for further management of DTs. Patient subsequently became obtunded, lost ability to protect airway, and was intubated. CT head neg for acute events. Hospital course complicated by AFib with RVR, sepsis, Pseudomonas PNA, aspiration pneumonia. Self extubated on 11/2 without need for reintubation initially, but on the morning of 11/4 patient became obtunded with difficulty managing secretions requiring reintubation. Pt has had recurrent ruptured  balloons and ETT breakage, with multiple failed weaning trials. S/p tracheostomy 11/9 and PEG 11/22. Readmitted to the ICU secondary to for fever r/o sepsis and recurrent AFib with RVR.    Events last 24 hours: BP soft this am, meds held, albumin bolus given with good response, later on was able to go to [facility]       PAST MEDICAL & SURGICAL HISTORY:  Falls  Meningitis  Collapsed lung  Alcohol withdrawal  Emphysema of lung  ETOH abuse  Cirrhosis  Afib  CHF (congestive heart failure)  Poor historian  Alcohol abuse  Chronic atrial fibrillation  S/P BKA (below knee amputation), left: secondary to worsening infection in lower leg. Had both ankle injuries from fall s/p repair - left had complication.  S/P BKA (below knee amputation) unilateral, left      Review of Systems:  Cannot be obtained as pt is trached    Medications:    digoxin     Tablet 0.125 milliGRAM(s) Oral daily  diltiazem    Tablet 60 milliGRAM(s) Oral every 6 hours  lisinopril 5 milliGRAM(s) Oral daily  metoprolol     tartrate 25 milliGRAM(s) Oral every 8 hours  metoprolol    tartrate Injectable 5 milliGRAM(s) IV Push every 6 hours PRN  tamsulosin 0.4 milliGRAM(s) Oral at bedtime    ALBUTerol/ipratropium for Nebulization 3 milliLiter(s) Nebulizer every 6 hours    acetaminophen    Suspension 650 milliGRAM(s) Oral every 6 hours PRN  fentaNYL    Injectable 25 MICROGram(s) IV Push every 4 hours PRN  haloperidol    Injectable 2 milliGRAM(s) IV Push every 6 hours PRN  OLANZapine 2.5 milliGRAM(s) Oral daily  OLANZapine 5 milliGRAM(s) Oral at bedtime      enoxaparin Injectable 40 milliGRAM(s) SubCutaneous every 24 hours    bisacodyl Suppository 10 milliGRAM(s) Rectal daily PRN  lactulose Syrup 10 Gram(s) Oral daily  pantoprazole  Injectable 40 milliGRAM(s) IV Push daily        albumin human 25% IVPB 100 milliLiter(s) IV Intermittent once  folic acid 1 milliGRAM(s) Oral daily  multivitamin 1 Tablet(s) Oral daily    influenza   Vaccine 0.5 milliLiter(s) IntraMuscular once    chlorhexidine 0.12% Liquid 15 milliLiter(s) Swish and Spit two times a day    nicotine - 21 mG/24Hr(s) Patch 1 patch Transdermal daily      Mode: CPAP with PS  FiO2: 30  PEEP: 5  MAP: 9      ICU Vital Signs Last 24 Hrs  T(C): 36.9 (26 Nov 2017 07:00), Max: 37.7 (25 Nov 2017 16:24)  T(F): 98.5 (26 Nov 2017 07:00), Max: 99.8 (25 Nov 2017 16:24)  HR: 74 (26 Nov 2017 09:00) (58 - 101)  BP: 95/51 (26 Nov 2017 09:00) (69/36 - 153/66)  BP(mean): 67 (26 Nov 2017 09:00) (48 - 95)  ABP: --  ABP(mean): --  RR: 17 (26 Nov 2017 09:00) (12 - 37)  SpO2: 97% (26 Nov 2017 09:00) (96% - 99%)          I&O's Detail    25 Nov 2017 07:01  -  26 Nov 2017 07:00  --------------------------------------------------------  IN:    Enteral Tube Flush: 180 mL    Free Water: 1170 mL    Glucerna: 1320 mL    Solution: 1000 mL  Total IN: 3670 mL    OUT:    Incontinent per Condom Catheter: 1725 mL  Total OUT: 1725 mL    Total NET: 1945 mL      26 Nov 2017 07:01  -  26 Nov 2017 09:54  --------------------------------------------------------  IN:    Glucerna: 120 mL  Total IN: 120 mL    OUT:    Incontinent per Condom Catheter: 130 mL    Voided: 160 mL  Total OUT: 290 mL    Total NET: -170 mL            LABS:                        10.0   7.7   )-----------( 261      ( 25 Nov 2017 06:10 )             31.6     11-25    142  |  105  |  17.0  ----------------------------<  112  4.5   |  26.0  |  0.47<L>    Ca    9.0      25 Nov 2017 06:10  Phos  4.1     11-25  Mg     2.0     11-25            CAPILLARY BLOOD GLUCOSE            CULTURES:      Physical Examination:    General: No acute distress.      HEENT: Pupils equal, reactive to light.  Symmetric.    PULM: scattered rhonchi to auscultation bilaterally, scant sputum production    CVS: irregular rhythm regular rate no murmurs, rubs, or gallops    ABD: Soft, nondistended, nontender, normoactive bowel sounds, PEG in place     EXT: No edema, nontender, L BKA    SKIN: Warm and well perfused, no rashes noted.    NEURO: Alert, oriented at times, interactive, nonfocal    RADIOLOGY: ***    CRITICAL CARE TIME SPENT: ***

## 2017-11-26 NOTE — PROGRESS NOTE ADULT - PROBLEM SELECTOR PLAN 1
continuing vent weaning process has been on CPAP for long periods of time, today placed on trach collar  would rest on PS overnight

## 2017-11-26 NOTE — PROGRESS NOTE ADULT - PROBLEM SELECTOR PLAN 2
rate has maintained better control over the past few days but BP has been low, meds held at times for BP, continue with digoxin, metoprolol and cardizem  check Digoxin level on Tuesday  no a/c as pt has h./o falls

## 2017-11-27 DIAGNOSIS — J43.9 EMPHYSEMA, UNSPECIFIED: ICD-10-CM

## 2017-11-27 LAB
ANION GAP SERPL CALC-SCNC: 11 MMOL/L — SIGNIFICANT CHANGE UP (ref 5–17)
BUN SERPL-MCNC: 16 MG/DL — SIGNIFICANT CHANGE UP (ref 8–20)
CALCIUM SERPL-MCNC: 9.3 MG/DL — SIGNIFICANT CHANGE UP (ref 8.6–10.2)
CHLORIDE SERPL-SCNC: 99 MMOL/L — SIGNIFICANT CHANGE UP (ref 98–107)
CO2 SERPL-SCNC: 27 MMOL/L — SIGNIFICANT CHANGE UP (ref 22–29)
CREAT SERPL-MCNC: 0.57 MG/DL — SIGNIFICANT CHANGE UP (ref 0.5–1.3)
GLUCOSE SERPL-MCNC: 120 MG/DL — HIGH (ref 70–115)
HCT VFR BLD CALC: 31.9 % — LOW (ref 42–52)
HGB BLD-MCNC: 10.4 G/DL — LOW (ref 14–18)
MAGNESIUM SERPL-MCNC: 1.8 MG/DL — SIGNIFICANT CHANGE UP (ref 1.6–2.6)
MCHC RBC-ENTMCNC: 29.9 PG — SIGNIFICANT CHANGE UP (ref 27–31)
MCHC RBC-ENTMCNC: 32.6 G/DL — SIGNIFICANT CHANGE UP (ref 32–36)
MCV RBC AUTO: 91.7 FL — SIGNIFICANT CHANGE UP (ref 80–94)
PHOSPHATE SERPL-MCNC: 3.4 MG/DL — SIGNIFICANT CHANGE UP (ref 2.4–4.7)
PLATELET # BLD AUTO: 262 K/UL — SIGNIFICANT CHANGE UP (ref 150–400)
POTASSIUM SERPL-MCNC: 4.3 MMOL/L — SIGNIFICANT CHANGE UP (ref 3.5–5.3)
POTASSIUM SERPL-SCNC: 4.3 MMOL/L — SIGNIFICANT CHANGE UP (ref 3.5–5.3)
RBC # BLD: 3.48 M/UL — LOW (ref 4.6–6.2)
RBC # FLD: 15.4 % — SIGNIFICANT CHANGE UP (ref 11–15.6)
SODIUM SERPL-SCNC: 137 MMOL/L — SIGNIFICANT CHANGE UP (ref 135–145)
WBC # BLD: 6.2 K/UL — SIGNIFICANT CHANGE UP (ref 4.8–10.8)
WBC # FLD AUTO: 6.2 K/UL — SIGNIFICANT CHANGE UP (ref 4.8–10.8)

## 2017-11-27 PROCEDURE — 99233 SBSQ HOSP IP/OBS HIGH 50: CPT

## 2017-11-27 RX ORDER — MAGNESIUM SULFATE 500 MG/ML
2 VIAL (ML) INJECTION ONCE
Qty: 0 | Refills: 0 | Status: COMPLETED | OUTPATIENT
Start: 2017-11-27 | End: 2017-11-27

## 2017-11-27 RX ADMIN — Medication 25 MILLIGRAM(S): at 13:53

## 2017-11-27 RX ADMIN — Medication 1 TABLET(S): at 12:06

## 2017-11-27 RX ADMIN — Medication 25 MILLIGRAM(S): at 05:02

## 2017-11-27 RX ADMIN — Medication 3 MILLILITER(S): at 20:47

## 2017-11-27 RX ADMIN — PANTOPRAZOLE SODIUM 40 MILLIGRAM(S): 20 TABLET, DELAYED RELEASE ORAL at 12:06

## 2017-11-27 RX ADMIN — LISINOPRIL 5 MILLIGRAM(S): 2.5 TABLET ORAL at 05:02

## 2017-11-27 RX ADMIN — Medication 25 MILLIGRAM(S): at 22:48

## 2017-11-27 RX ADMIN — TAMSULOSIN HYDROCHLORIDE 0.4 MILLIGRAM(S): 0.4 CAPSULE ORAL at 22:48

## 2017-11-27 RX ADMIN — Medication 50 GRAM(S): at 08:19

## 2017-11-27 RX ADMIN — OLANZAPINE 5 MILLIGRAM(S): 15 TABLET, FILM COATED ORAL at 22:47

## 2017-11-27 RX ADMIN — Medication 3 MILLILITER(S): at 14:49

## 2017-11-27 RX ADMIN — HALOPERIDOL DECANOATE 2 MILLIGRAM(S): 100 INJECTION INTRAMUSCULAR at 12:07

## 2017-11-27 RX ADMIN — ENOXAPARIN SODIUM 40 MILLIGRAM(S): 100 INJECTION SUBCUTANEOUS at 12:06

## 2017-11-27 RX ADMIN — Medication 1 MILLIGRAM(S): at 12:06

## 2017-11-27 RX ADMIN — OLANZAPINE 2.5 MILLIGRAM(S): 15 TABLET, FILM COATED ORAL at 12:06

## 2017-11-27 RX ADMIN — Medication 3 MILLILITER(S): at 07:53

## 2017-11-27 RX ADMIN — Medication 0.12 MILLIGRAM(S): at 05:02

## 2017-11-27 RX ADMIN — Medication 1 PATCH: at 12:05

## 2017-11-27 RX ADMIN — Medication 3 MILLILITER(S): at 03:49

## 2017-11-27 RX ADMIN — LACTULOSE 10 GRAM(S): 10 SOLUTION ORAL at 12:06

## 2017-11-27 RX ADMIN — CHLORHEXIDINE GLUCONATE 15 MILLILITER(S): 213 SOLUTION TOPICAL at 17:22

## 2017-11-27 RX ADMIN — CHLORHEXIDINE GLUCONATE 15 MILLILITER(S): 213 SOLUTION TOPICAL at 05:02

## 2017-11-27 NOTE — PROGRESS NOTE ADULT - ASSESSMENT
63 yo male, PMHx COPD, AFib not on AC, CHF, alcoholic cirrhosis, EtOH abuse, EtOH withdrawal (intubated in past for airway protection), initially admitted on 10/28 for COPD exacerbation and alcohol intoxication, delirium tremens, AFib with RVR, Pseudomonas and aspiration PNA, acute respiratory failure now chronic vent-dependent respiratory failure s/p trach and PEG    PLAN:  RESPIRATORY FAILURE: Patient currently on pressure support. Return to full AC for hypoxemia or respiratory distress. Titrate vent settings to maintain SaO2 >90%, or pH >7.25. Lung protective strategy and plateau pressure goal < 30. VAP prophylaxis with Peridex oral care. Aggressive chest PT and suctioning. Trach collar as tolerated. Will need planning for long term vent care facility.    AFIB: Rate controlled on current regimen (Lopressor and Cardizem). S/p Digoxin load, check dig level in AM. Continue to treat PRN with Lopressor IVP for HR > 140    ALTERED MENTAL STATUS: Possible encephalopathy related to long-standing EtOH abuse vs component of delirium from prolonged hospitalization. Waxes and wanes, continue to monitor. 1:1 constant observation for pt safety.    FEVER OF UNKNOWN ORIGIN: Completed course of antibiotics. No clear focus of infection exists at this time. Has been afebrile since 11/23.     Maintain ICU level of care for cardiac monitoring with interventions for intermittent AFib with RVR as well as ventilator titration.

## 2017-11-27 NOTE — PROGRESS NOTE ADULT - ATTENDING COMMENTS
I saw this patient independently at 0845 and again on multidisciplinary rounds and agree with the above.  He has performed well on tracheostomy collar and requires ventilator support at night (or at least last night); remains quite encephalopathic with waxing and waning mental status.  He requires cardiac monitoring given the frequent rapid ventricular response episodes he has experienced during his stay; he is now receiving digoxin (peewee blockers were frequently held due to hypotension which prompted AFRVR episodes with some frequency).  Plan:  Digoxin level tomorrow  Continue trach collar daily and ventilator at night  discontinued fentanyl prn  continue antipsychotic therapy for now -- will consider psych consultation  Tolerating tube feeds; last bowel movement 11/26 and previous to that was 11/21 -- added lactulose to miralax and bisocodyl prn.  Contacted [facility] [phone] -- her concerns are that he hasn't been out of bed and would like additional attempts at out of bed.  plan on continuing ICU care until he doesn't require either monitor or ventilator -- if both then needs to remain in ICU for now.

## 2017-11-28 LAB
ANION GAP SERPL CALC-SCNC: 10 MMOL/L — SIGNIFICANT CHANGE UP (ref 5–17)
BUN SERPL-MCNC: 20 MG/DL — SIGNIFICANT CHANGE UP (ref 8–20)
CALCIUM SERPL-MCNC: 9 MG/DL — SIGNIFICANT CHANGE UP (ref 8.6–10.2)
CHLORIDE SERPL-SCNC: 98 MMOL/L — SIGNIFICANT CHANGE UP (ref 98–107)
CO2 SERPL-SCNC: 27 MMOL/L — SIGNIFICANT CHANGE UP (ref 22–29)
CREAT SERPL-MCNC: 0.56 MG/DL — SIGNIFICANT CHANGE UP (ref 0.5–1.3)
DIGOXIN SERPL-MCNC: 0.7 NG/ML — LOW (ref 0.8–2)
GLUCOSE SERPL-MCNC: 135 MG/DL — HIGH (ref 70–115)
MAGNESIUM SERPL-MCNC: 1.9 MG/DL — SIGNIFICANT CHANGE UP (ref 1.6–2.6)
PHOSPHATE SERPL-MCNC: 3.3 MG/DL — SIGNIFICANT CHANGE UP (ref 2.4–4.7)
POTASSIUM SERPL-MCNC: 4.9 MMOL/L — SIGNIFICANT CHANGE UP (ref 3.5–5.3)
POTASSIUM SERPL-SCNC: 4.9 MMOL/L — SIGNIFICANT CHANGE UP (ref 3.5–5.3)
SODIUM SERPL-SCNC: 135 MMOL/L — SIGNIFICANT CHANGE UP (ref 135–145)

## 2017-11-28 PROCEDURE — 99233 SBSQ HOSP IP/OBS HIGH 50: CPT

## 2017-11-28 RX ADMIN — Medication 25 MILLIGRAM(S): at 13:35

## 2017-11-28 RX ADMIN — Medication 25 MILLIGRAM(S): at 05:47

## 2017-11-28 RX ADMIN — OLANZAPINE 2.5 MILLIGRAM(S): 15 TABLET, FILM COATED ORAL at 11:27

## 2017-11-28 RX ADMIN — Medication 3 MILLILITER(S): at 03:13

## 2017-11-28 RX ADMIN — Medication 3 MILLILITER(S): at 20:24

## 2017-11-28 RX ADMIN — Medication 3 MILLILITER(S): at 08:26

## 2017-11-28 RX ADMIN — PANTOPRAZOLE SODIUM 40 MILLIGRAM(S): 20 TABLET, DELAYED RELEASE ORAL at 11:28

## 2017-11-28 RX ADMIN — LACTULOSE 10 GRAM(S): 10 SOLUTION ORAL at 11:27

## 2017-11-28 RX ADMIN — Medication 3 MILLILITER(S): at 14:33

## 2017-11-28 RX ADMIN — CHLORHEXIDINE GLUCONATE 15 MILLILITER(S): 213 SOLUTION TOPICAL at 17:37

## 2017-11-28 RX ADMIN — LISINOPRIL 5 MILLIGRAM(S): 2.5 TABLET ORAL at 05:47

## 2017-11-28 RX ADMIN — Medication 25 MILLIGRAM(S): at 21:21

## 2017-11-28 RX ADMIN — ENOXAPARIN SODIUM 40 MILLIGRAM(S): 100 INJECTION SUBCUTANEOUS at 11:29

## 2017-11-28 RX ADMIN — Medication 1 TABLET(S): at 11:27

## 2017-11-28 RX ADMIN — Medication 1 MILLIGRAM(S): at 11:27

## 2017-11-28 RX ADMIN — HALOPERIDOL DECANOATE 2 MILLIGRAM(S): 100 INJECTION INTRAMUSCULAR at 21:20

## 2017-11-28 RX ADMIN — Medication 0.12 MILLIGRAM(S): at 05:47

## 2017-11-28 RX ADMIN — OLANZAPINE 5 MILLIGRAM(S): 15 TABLET, FILM COATED ORAL at 21:21

## 2017-11-28 RX ADMIN — TAMSULOSIN HYDROCHLORIDE 0.4 MILLIGRAM(S): 0.4 CAPSULE ORAL at 21:21

## 2017-11-28 RX ADMIN — CHLORHEXIDINE GLUCONATE 15 MILLILITER(S): 213 SOLUTION TOPICAL at 05:47

## 2017-11-28 NOTE — PROGRESS NOTE ADULT - SUBJECTIVE AND OBJECTIVE BOX
Patient is a 62y old  Male who presents with a chief complaint of Shortness of breath (30 Oct 2017 15:03)      BRIEF HOSPITAL COURSE: 61 yo male, PMHx COPD, AFib not on AC, CHF, alcoholic cirrhosis, EtOH abuse, EtOH withdrawal (intubated in past for airway protection), initially admitted on 10/28 for COPD exacerbation and alcohol intoxication, signed out AMA and was found later in hospital disoriented. Readmitted and RRT called for development of acute EtOH withdrawal. Transferred to MICU for further management of DTs. Patient subsequently became obtunded, lost ability to protect airway, and was intubated. CT head neg for acute events. Hospital course complicated by AFib with RVR, sepsis, Pseudomonas PNA, aspiration pneumonia. Self extubated on 11/2 without need for reintubation initially, but on the morning of 11/4 patient became obtunded with difficulty managing secretions requiring reintubation. Pt has had recurrent ruptured  balloons and ETT breakage, with multiple failed weaning trials. S/p tracheostomy 11/9 and PEG 11/22. Readmitted to the ICU secondary to for fever r/o sepsis and recurrent AFib with RVR.    Events last 24 hours: Afebrile. HR stable with one short (8 beat) run of AFRVR at 0800. Tolerated trach collar for most of day, on CPAP overnight      PAST MEDICAL & SURGICAL HISTORY:  Falls  Meningitis  Collapsed lung  Alcohol withdrawal  Emphysema of lung  ETOH abuse  Cirrhosis  Afib  CHF (congestive heart failure)  Poor historian  Alcohol abuse  Chronic atrial fibrillation  S/P BKA (below knee amputation), left: secondary to worsening infection in lower leg. Had both ankle injuries from fall s/p repair - left had complication.  S/P BKA (below knee amputation) unilateral, left      Review of Systems:  unable, nonverbal    Medications:  MEDICATIONS  (STANDING):  ALBUTerol/ipratropium for Nebulization 3 milliLiter(s) Nebulizer every 6 hours  chlorhexidine 0.12% Liquid 15 milliLiter(s) Swish and Spit two times a day  digoxin     Tablet 0.125 milliGRAM(s) Oral daily  diltiazem    Tablet 60 milliGRAM(s) Oral every 6 hours  enoxaparin Injectable 40 milliGRAM(s) SubCutaneous every 24 hours  folic acid 1 milliGRAM(s) Oral daily  influenza   Vaccine 0.5 milliLiter(s) IntraMuscular once  lactulose Syrup 10 Gram(s) Oral daily  lisinopril 5 milliGRAM(s) Oral daily  metoprolol     tartrate 25 milliGRAM(s) Oral every 8 hours  multivitamin 1 Tablet(s) Oral daily  OLANZapine 2.5 milliGRAM(s) Oral daily  OLANZapine 5 milliGRAM(s) Oral at bedtime  pantoprazole  Injectable 40 milliGRAM(s) IV Push daily  tamsulosin 0.4 milliGRAM(s) Oral at bedtime    MEDICATIONS  (PRN):  acetaminophen    Suspension 650 milliGRAM(s) Oral every 6 hours PRN For Temp greater than 38 C (100.4 F)  bisacodyl Suppository 10 milliGRAM(s) Rectal daily PRN Constipation  haloperidol    Injectable 2 milliGRAM(s) IV Push every 6 hours PRN agitation  metoprolol    tartrate Injectable 5 milliGRAM(s) IV Push every 6 hours PRN HR > 120      Mode: CPAP with PS  FiO2: 30  PEEP: 5  PS: 10  MAP: 8      ICU Vital Signs Last 24 Hrs  T(C): 37.1 (28 Nov 2017 11:55), Max: 37.2 (28 Nov 2017 04:00)  T(F): 98.8 (28 Nov 2017 11:55), Max: 99 (28 Nov 2017 04:00)  HR: 91 (28 Nov 2017 19:00) (84 - 108)  BP: 104/54 (28 Nov 2017 19:00) (93/54 - 141/83)  BP(mean): 72 (28 Nov 2017 19:00) (70 - 101)  ABP: --  ABP(mean): --  RR: 30 (28 Nov 2017 19:00) (17 - 46)  SpO2: 98% (28 Nov 2017 20:26) (96% - 100%)        I&O's Detail    27 Nov 2017 07:01  -  28 Nov 2017 07:00  --------------------------------------------------------  IN:    Enteral Tube Flush: 220 mL    Free Water: 600 mL    Glucerna: 1380 mL    Solution: 50 mL  Total IN: 2250 mL    OUT:    Incontinent per Condom Catheter: 2050 mL  Total OUT: 2050 mL    Total NET: 200 mL      28 Nov 2017 07:01  -  28 Nov 2017 22:17  --------------------------------------------------------  IN:    Free Water: 100 mL    Glucerna: 720 mL  Total IN: 820 mL    OUT:    Incontinent per Condom Catheter: 300 mL  Total OUT: 300 mL    Total NET: 520 mL                LABS:                                   10.4   6.2   )-----------( 262      ( 27 Nov 2017 05:52 )             31.9   11-28    135  |  98  |  20.0  ----------------------------<  135<H>  4.9   |  27.0  |  0.56    Ca    9.0      28 Nov 2017 05:18  Phos  3.3     11-28  Mg     1.9     11-28              CAPILLARY BLOOD GLUCOSE            CULTURES:      Physical Examination:    General: No acute distress.      HEENT: Pupils 3mm equal, sluggish reactive to light. Symmetric.    PULM: Trach to vent on CPAP. Diminished to auscultation bilaterally, no respiratory distress    CVS: AFib regular rate, no murmurs, rubs, or gallops    ABD: Soft, nondistended, nontender, normoactive bowel sounds, no masses. PEG in place.    EXT: L BKA. No edema, nontender    SKIN: Warm and well perfused, no rashes noted.    NEURO: RASS 0, awake and alert, attempts to communicate verbally at times; somnulent at times; impulsive at times.  moves extremities.

## 2017-11-28 NOTE — PROGRESS NOTE ADULT - PROBLEM SELECTOR PLAN 2
improving slowly.  Longstanding alcohol/[place]? along with polypharmacy and underlying psychiatric issues.

## 2017-11-28 NOTE — PROGRESS NOTE ADULT - ASSESSMENT
63 yo male, PMHx COPD, AFib not on AC, CHF, alcoholic cirrhosis, EtOH abuse, EtOH withdrawal (intubated in past for airway protection), initially admitted on 10/28 for COPD exacerbation and alcohol intoxication, delirium tremens, AFib with RVR, Pseudomonas and aspiration PNA, acute respiratory failure now chronic vent-dependent respiratory failure s/p trach and PEG    PLAN:      AFIB: Rate controlled on current regimen (Lopressor and Cardizem). S/p Digoxin load, check dig level in AM. Continue to treat PRN with Lopressor IVP for HR > 140    FEVER OF UNKNOWN ORIGIN: Completed course of antibiotics. No clear focus of infection exists at this time. Has been afebrile since 11/23.     Maintain ICU level of care for cardiac monitoring with interventions for intermittent AFib with RVR as well as ventilator titration.

## 2017-11-28 NOTE — PROGRESS NOTE ADULT - ATTENDING COMMENTS
His mental status is slowly improving; would plan to move patient off monitor if no further events on telemetry given chronic controlled atrial fibrillation at this point.

## 2017-11-29 DIAGNOSIS — I10 ESSENTIAL (PRIMARY) HYPERTENSION: ICD-10-CM

## 2017-11-29 DIAGNOSIS — K74.60 UNSPECIFIED CIRRHOSIS OF LIVER: ICD-10-CM

## 2017-11-29 DIAGNOSIS — F10.231 ALCOHOL DEPENDENCE WITH WITHDRAWAL DELIRIUM: ICD-10-CM

## 2017-11-29 DIAGNOSIS — K70.30 ALCOHOLIC CIRRHOSIS OF LIVER WITHOUT ASCITES: ICD-10-CM

## 2017-11-29 PROCEDURE — 99233 SBSQ HOSP IP/OBS HIGH 50: CPT

## 2017-11-29 PROCEDURE — 99232 SBSQ HOSP IP/OBS MODERATE 35: CPT

## 2017-11-29 RX ORDER — PANTOPRAZOLE SODIUM 20 MG/1
40 TABLET, DELAYED RELEASE ORAL DAILY
Qty: 0 | Refills: 0 | Status: DISCONTINUED | OUTPATIENT
Start: 2017-11-29 | End: 2017-12-14

## 2017-11-29 RX ADMIN — Medication 3 MILLILITER(S): at 09:14

## 2017-11-29 RX ADMIN — TAMSULOSIN HYDROCHLORIDE 0.4 MILLIGRAM(S): 0.4 CAPSULE ORAL at 21:24

## 2017-11-29 RX ADMIN — Medication 3 MILLILITER(S): at 03:52

## 2017-11-29 RX ADMIN — Medication 3 MILLILITER(S): at 19:57

## 2017-11-29 RX ADMIN — Medication 1 MILLIGRAM(S): at 18:03

## 2017-11-29 RX ADMIN — CHLORHEXIDINE GLUCONATE 15 MILLILITER(S): 213 SOLUTION TOPICAL at 18:03

## 2017-11-29 RX ADMIN — Medication 25 MILLIGRAM(S): at 21:23

## 2017-11-29 RX ADMIN — Medication 1 TABLET(S): at 12:24

## 2017-11-29 RX ADMIN — PANTOPRAZOLE SODIUM 40 MILLIGRAM(S): 20 TABLET, DELAYED RELEASE ORAL at 12:28

## 2017-11-29 RX ADMIN — LACTULOSE 10 GRAM(S): 10 SOLUTION ORAL at 12:25

## 2017-11-29 RX ADMIN — Medication 0.12 MILLIGRAM(S): at 05:20

## 2017-11-29 RX ADMIN — CHLORHEXIDINE GLUCONATE 15 MILLILITER(S): 213 SOLUTION TOPICAL at 05:20

## 2017-11-29 RX ADMIN — OLANZAPINE 5 MILLIGRAM(S): 15 TABLET, FILM COATED ORAL at 21:24

## 2017-11-29 RX ADMIN — ENOXAPARIN SODIUM 40 MILLIGRAM(S): 100 INJECTION SUBCUTANEOUS at 12:26

## 2017-11-29 RX ADMIN — Medication 3 MILLILITER(S): at 15:41

## 2017-11-29 RX ADMIN — OLANZAPINE 2.5 MILLIGRAM(S): 15 TABLET, FILM COATED ORAL at 12:25

## 2017-11-29 RX ADMIN — Medication 25 MILLIGRAM(S): at 14:00

## 2017-11-29 RX ADMIN — LISINOPRIL 5 MILLIGRAM(S): 2.5 TABLET ORAL at 05:20

## 2017-11-29 RX ADMIN — Medication 25 MILLIGRAM(S): at 05:19

## 2017-11-29 NOTE — PROGRESS NOTE ADULT - ASSESSMENT
63 yo male, PMHx COPD, AFib not on AC, CHF, alcoholic cirrhosis, EtOH abuse, EtOH withdrawal (intubated in past for airway protection), initially admitted on 10/28 for COPD exacerbation and alcohol intoxication, signed out AMA and was found later in hospital disoriented. Readmitted and RRT called for development of acute EtOH withdrawal. Transferred to MICU for further management of DTs. Patient subsequently became obtunded, lost ability to protect airway, and was intubated. Hospital course c/b sepsis and A.Fib, failed weaning trials, underwent trach and PEG and now downgraded to medical floor.

## 2017-11-29 NOTE — PROGRESS NOTE ADULT - PROBLEM SELECTOR PLAN 2
chronic and failed previous attempts at counselling.  Will need social work referral for counseling at [place]

## 2017-11-29 NOTE — PROGRESS NOTE ADULT - PROBLEM SELECTOR PLAN 3
S/p Aspiration PNA ultimately requiring Trach/PEG. Now tolerating nocturnal vent only. Will continue weaning for longer periods off vent as tolerated. Needs Trach also for secretion clearance. pulmonary to follow. Continue with VAP bundle

## 2017-11-29 NOTE — PROGRESS NOTE ADULT - SUBJECTIVE AND OBJECTIVE BOX
Patient is a 62y old  Male who presents with a chief complaint of Shortness of breath (30 Oct 2017 15:03)      BRIEF HOSPITAL COURSE: BRIEF HOSPITAL COURSE: 61 yo male, PMHx COPD, AFib not on AC, CHF, alcoholic cirrhosis, EtOH abuse, EtOH withdrawal (intubated in past for airway protection), initially admitted on 10/28 for COPD exacerbation and alcohol intoxication, signed out AMA and was found later in hospital disoriented. Readmitted and RRT called for development of acute EtOH withdrawal. Transferred to MICU for further management of DTs. Patient subsequently became obtunded, lost ability to protect airway, and was intubated. CT head neg for acute events. Hospital course complicated by AFib with RVR, sepsis, Pseudomonas PNA, aspiration pneumonia. Self extubated on 11/2 without need for reintubation initially, but on the morning of 11/4 patient became obtunded with difficulty managing secretions requiring reintubation. Pt has had recurrent ruptured  balloons and ETT breakage, with multiple failed weaning trials. S/p tracheostomy 11/9 and PEG 11/22. Readmitted to the ICU secondary to for fever r/o sepsis and recurrent AFib with RVR.    Events last 24 hours: Afebrile. HR stable, no further episodes of RVR or hypotension. Tolerated trach collar for last 3 days, on CPAP overnight.      PAST MEDICAL & SURGICAL HISTORY:  Falls  Meningitis  Collapsed lung  Alcohol withdrawal  Emphysema of lung  ETOH abuse  Cirrhosis  Afib  CHF (congestive heart failure)  Poor historian  Alcohol abuse  Chronic atrial fibrillation  S/P BKA (below knee amputation), left: secondary to worsening infection in lower leg. Had both ankle injuries from fall s/p repair - left had complication.  S/P BKA (below knee amputation) unilateral, left      Review of Systems:  CONSTITUTIONAL: No fever, chills, or fatigue  EYES: No eye pain, visual disturbances, or discharge  ENMT:  No difficulty hearing, tinnitus, vertigo; No sinus or throat pain  NECK: No pain or stiffness  RESPIRATORY: No cough, wheezing, chills or hemoptysis; No shortness of breath  CARDIOVASCULAR: No chest pain, + palpitations, No dizziness, or leg swelling  GASTROINTESTINAL: No abdominal or epigastric pain. No nausea, vomiting, or hematemesis; No diarrhea or constipation. No melena or hematochezia.  GENITOURINARY: No dysuria, frequency, hematuria, or incontinence  NEUROLOGICAL: No headaches, + memory loss, no loss of strength, numbness,  + tremors  SKIN: No itching, burning, rashes, or lesions   MUSCULOSKELETAL: No joint pain or swelling; No muscle, back, or extremity pain  PSYCHIATRIC: No depression, + anxiety, + mood swings, No difficulty sleeping      Medications:    digoxin     Tablet 0.125 milliGRAM(s) Oral daily  diltiazem    Tablet 60 milliGRAM(s) Oral every 6 hours  lisinopril 5 milliGRAM(s) Oral daily  metoprolol     tartrate 25 milliGRAM(s) Oral every 8 hours  metoprolol    tartrate Injectable 5 milliGRAM(s) IV Push every 6 hours PRN  tamsulosin 0.4 milliGRAM(s) Oral at bedtime    ALBUTerol/ipratropium for Nebulization 3 milliLiter(s) Nebulizer every 6 hours    acetaminophen    Suspension 650 milliGRAM(s) Oral every 6 hours PRN  haloperidol    Injectable 2 milliGRAM(s) IV Push every 6 hours PRN  OLANZapine 2.5 milliGRAM(s) Oral daily  OLANZapine 5 milliGRAM(s) Oral at bedtime      enoxaparin Injectable 40 milliGRAM(s) SubCutaneous every 24 hours    bisacodyl Suppository 10 milliGRAM(s) Rectal daily PRN  lactulose Syrup 10 Gram(s) Oral daily  pantoprazole   Suspension 40 milliGRAM(s) Oral daily        folic acid 1 milliGRAM(s) Oral daily  multivitamin 1 Tablet(s) Oral daily    influenza   Vaccine 0.5 milliLiter(s) IntraMuscular once    chlorhexidine 0.12% Liquid 15 milliLiter(s) Swish and Spit two times a day        Mode: standby      ICU Vital Signs Last 24 Hrs  T(C): 36.9 (29 Nov 2017 15:00), Max: 37.4 (29 Nov 2017 07:27)  T(F): 98.5 (29 Nov 2017 15:00), Max: 99.3 (29 Nov 2017 07:27)  HR: 95 (29 Nov 2017 16:00) (91 - 118)  BP: 126/69 (29 Nov 2017 16:00) (97/63 - 131/59)  BP(mean): 91 (29 Nov 2017 16:00) (70 - 91)  ABP: --  ABP(mean): --  RR: 20 (29 Nov 2017 16:00) (11 - 39)  SpO2: 100% (29 Nov 2017 16:00) (94% - 100%)          I&O's Detail    28 Nov 2017 07:01  -  29 Nov 2017 07:00  --------------------------------------------------------  IN:    Free Water: 200 mL    Glucerna: 1320 mL  Total IN: 1520 mL    OUT:    Incontinent per Condom Catheter: 550 mL  Total OUT: 550 mL    Total NET: 970 mL      29 Nov 2017 07:01  -  29 Nov 2017 17:26  --------------------------------------------------------  IN:    Enteral Tube Flush: 60 mL    Free Water: 100 mL    Glucerna: 540 mL  Total IN: 700 mL    OUT:  Total OUT: 0 mL    Total NET: 700 mL            LABS:    11-28    135  |  98  |  20.0  ----------------------------<  135<H>  4.9   |  27.0  |  0.56    Ca    9.0      28 Nov 2017 05:18  Phos  3.3     11-28  Mg     1.9     11-28            CAPILLARY BLOOD GLUCOSE            CULTURES:      Physical Examination:    General: No acute distress.  Alert, intermitently oriented, interactive, unsettled, piking at things.      HEENT: Pupils equal, reactive to light.  Symmetric.    PULM: decreased BS bilaterally at bases, no significant sputum production    CVS: Regular rate and rhythm, no murmurs, rubs, or gallops    ABD: Soft, nondistended, nontender, normoactive bowel sounds, no masses +PEG in place     EXT: No edema, nontender    SKIN: Warm and well perfused, no rashes noted.    RADIOLOGY:  MRI Brain: IMPRESSION: No acute infarction.

## 2017-11-29 NOTE — PROGRESS NOTE ADULT - PROBLEM SELECTOR PLAN 1
Resolved now, with some residual encephalopathy which is multifactoral. related to ICU psychosis, previous sepsis, malnutrition, sleep deprivation, Resolved now, with some residual encephalopathy which is multifactoral. related to ICU psychosis, previous sepsis, malnutrition, sleep deprivation,.    Will continue with aggressive nutrition (tube feeds via PEG). Needs daily PT.  1:1 observation for re-orientation.

## 2017-11-29 NOTE — PROGRESS NOTE ADULT - SUBJECTIVE AND OBJECTIVE BOX
STONE DIAZ  Male   62y    [id number]        Patient is a 62y old  Male who presents with a chief complaint of Shortness of breath (30 Oct 2017 15:03)      INTERVAL HPI/OVERNIGHT EVENTS:  Pt seen and examined at bedside, no overnight event reported by night staff. Pt reported doing well and has no active complaints.    REVIEW OF SYSTEMS:  CONSTITUTIONAL: No fever, weight loss, or fatigue  RESPIRATORY: No cough, wheezing, hemoptysis; No shortness of breath  CARDIOVASCULAR: No chest pain, palpitations  GASTROINTESTINAL: No abdominal or epigastric pain. No nausea, vomiting  NEUROLOGICAL: No headaches,  loss of strength.  MISCELLANEOUS: No joint swelling or pain     PHYSICAL EXAM:    Vital Signs Last 24 Hrs  T(C): 36.8 (29 Nov 2017 12:00), Max: 37.4 (29 Nov 2017 07:27)  T(F): 98.3 (29 Nov 2017 12:00), Max: 99.3 (29 Nov 2017 07:27)  HR: 97 (29 Nov 2017 12:00) (91 - 118)  BP: 121/70 (29 Nov 2017 12:00) (93/54 - 131/59)  BP(mean): 89 (29 Nov 2017 12:00) (70 - 91)  RR: 18 (29 Nov 2017 12:00) (11 - 39)  SpO2: 99% (29 Nov 2017 12:00) (94% - 100%)    GENERAL: Elderly male looking   HEENT: PERRL, +EOMI  NECK: soft, Supple, No JVD,   CHEST/LUNG: Clear to auscultate bilaterally; No wheezing  HEART: S1S2+, Regular rate and rhythm; No murmurs, rubs, or gallops  ABDOMEN: Soft, Nontender, Nondistended; Bowel sounds present  EXTREMITIES:  2+ Peripheral Pulses, No clubbing, cyanosis, or edema  SKIN: No rashes or lesions  NEURO: AAOX3, no focal deficits, no motor r sensory loss  PSYCH: normal mood    I&O's Summary    28 Nov 2017 07:01  -  29 Nov 2017 07:00  --------------------------------------------------------  IN: 1520 mL / OUT: 550 mL / NET: 970 mL    29 Nov 2017 07:01  -  29 Nov 2017 13:09  --------------------------------------------------------  IN: 360 mL / OUT: 0 mL / NET: 360 mL        MEDICATIONS  (STANDING):  ALBUTerol/ipratropium for Nebulization 3 milliLiter(s) Nebulizer every 6 hours  chlorhexidine 0.12% Liquid 15 milliLiter(s) Swish and Spit two times a day  digoxin     Tablet 0.125 milliGRAM(s) Oral daily  diltiazem    Tablet 60 milliGRAM(s) Oral every 6 hours  enoxaparin Injectable 40 milliGRAM(s) SubCutaneous every 24 hours  folic acid 1 milliGRAM(s) Oral daily  influenza   Vaccine 0.5 milliLiter(s) IntraMuscular once  lactulose Syrup 10 Gram(s) Oral daily  lisinopril 5 milliGRAM(s) Oral daily  metoprolol     tartrate 25 milliGRAM(s) Oral every 8 hours  multivitamin 1 Tablet(s) Oral daily  OLANZapine 2.5 milliGRAM(s) Oral daily  OLANZapine 5 milliGRAM(s) Oral at bedtime  pantoprazole   Suspension 40 milliGRAM(s) Oral daily  tamsulosin 0.4 milliGRAM(s) Oral at bedtime    MEDICATIONS  (PRN):  acetaminophen    Suspension 650 milliGRAM(s) Oral every 6 hours PRN For Temp greater than 38 C (100.4 F)  bisacodyl Suppository 10 milliGRAM(s) Rectal daily PRN Constipation  haloperidol    Injectable 2 milliGRAM(s) IV Push every 6 hours PRN agitation  metoprolol    tartrate Injectable 5 milliGRAM(s) IV Push every 6 hours PRN HR > 120            LABS:    11-28    135  |  98  |  20.0  ----------------------------<  135<H>  4.9   |  27.0  |  0.56    Ca    9.0      28 Nov 2017 05:18  Phos  3.3     11-28  Mg     1.9     11-28 Acceptance Note.    STONE DIAZ  Male   62y    [id number]        61 yo male, PMHx COPD, AFib not on AC, CHF, alcoholic cirrhosis, EtOH abuse, EtOH withdrawal (intubated in past for airway protection), initially admitted on 10/28 for COPD exacerbation and alcohol intoxication, signed out AMA and was found later in hospital disoriented. Readmitted and RRT called for development of acute EtOH withdrawal. Transferred to MICU for further management of DTs. Patient subsequently became obtunded, lost ability to protect airway, and was intubated. CT head neg for acute events. Hospital course complicated by AFib with RVR, sepsis, Pseudomonas PNA, aspiration pneumonia. Self extubated on 11/2 without need for reintubation initially, but on the morning of 11/4 patient became obtunded with difficulty managing secretions requiring reintubation. Pt failed multiple weaning trials and underwent  tracheostomy 11/9 and PEG 11/22.  Patient is doing well and has been weaned off the vent during the day. he is downgraded to medical floor for continued management.    REVIEW OF SYSTEMS:  Trached, unable to obtain.    PHYSICAL EXAM:    Vital Signs Last 24 Hrs  T(C): 36.8 (29 Nov 2017 12:00), Max: 37.4 (29 Nov 2017 07:27)  T(F): 98.3 (29 Nov 2017 12:00), Max: 99.3 (29 Nov 2017 07:27)  HR: 97 (29 Nov 2017 12:00) (91 - 118)  BP: 121/70 (29 Nov 2017 12:00) (93/54 - 131/59)  BP(mean): 89 (29 Nov 2017 12:00) (70 - 91)  RR: 18 (29 Nov 2017 12:00) (11 - 39)  SpO2: 99% (29 Nov 2017 12:00) (94% - 100%)    GENERAL: NAD  HEENT: PERRL, +EOMI  NECK: Midline trach  CHEST/LUNG: Fair air movements, no rales or ronchi  HEART: S1S2+, Regular rate and rhythm; No murmurs  ABDOMEN: Soft, Nontender,  Bowel sounds present, PEG site clean.  EXTREMITIES:  s/p Left BKA  NEURO:  No focal deficit.    I&O's Summary    28 Nov 2017 07:01  -  29 Nov 2017 07:00  --------------------------------------------------------  IN: 1520 mL / OUT: 550 mL / NET: 970 mL    29 Nov 2017 07:01  -  29 Nov 2017 13:09  --------------------------------------------------------  IN: 360 mL / OUT: 0 mL / NET: 360 mL        MEDICATIONS  (STANDING):  ALBUTerol/ipratropium for Nebulization 3 milliLiter(s) Nebulizer every 6 hours  chlorhexidine 0.12% Liquid 15 milliLiter(s) Swish and Spit two times a day  digoxin     Tablet 0.125 milliGRAM(s) Oral daily  diltiazem    Tablet 60 milliGRAM(s) Oral every 6 hours  enoxaparin Injectable 40 milliGRAM(s) SubCutaneous every 24 hours  folic acid 1 milliGRAM(s) Oral daily  influenza   Vaccine 0.5 milliLiter(s) IntraMuscular once  lactulose Syrup 10 Gram(s) Oral daily  lisinopril 5 milliGRAM(s) Oral daily  metoprolol     tartrate 25 milliGRAM(s) Oral every 8 hours  multivitamin 1 Tablet(s) Oral daily  OLANZapine 2.5 milliGRAM(s) Oral daily  OLANZapine 5 milliGRAM(s) Oral at bedtime  pantoprazole   Suspension 40 milliGRAM(s) Oral daily  tamsulosin 0.4 milliGRAM(s) Oral at bedtime    MEDICATIONS  (PRN):  acetaminophen    Suspension 650 milliGRAM(s) Oral every 6 hours PRN For Temp greater than 38 C (100.4 F)  bisacodyl Suppository 10 milliGRAM(s) Rectal daily PRN Constipation  haloperidol    Injectable 2 milliGRAM(s) IV Push every 6 hours PRN agitation  metoprolol    tartrate Injectable 5 milliGRAM(s) IV Push every 6 hours PRN HR > 120            LABS:    11-28    135  |  98  |  20.0  ----------------------------<  135<H>  4.9   |  27.0  |  0.56    Ca    9.0      28 Nov 2017 05:18  Phos  3.3     11-28  Mg     1.9     11-28 Acceptance Note.    STONE DIAZ  Male   62y    [id number]        61 yo male, PMHx COPD, AFib not on AC, CHF, alcoholic cirrhosis, EtOH abuse, EtOH withdrawal (intubated in past for airway protection), initially admitted on 10/28 for COPD exacerbation and alcohol intoxication, signed out AMA and was found later in hospital disoriented. Readmitted and RRT called for development of acute EtOH withdrawal. Transferred to MICU for further management of DTs. Patient subsequently became obtunded, lost ability to protect airway, and was intubated. CT head neg for acute events. Hospital course complicated by AFib with RVR, sepsis, Pseudomonas PNA, aspiration pneumonia. Self extubated on 11/2 without need for reintubation initially, but on the morning of 11/4 patient became obtunded with difficulty managing secretions requiring reintubation. Pt failed multiple weaning trials and underwent  tracheostomy 11/9 and PEG 11/22.  Patient is doing well and has been weaned off the vent during the day and now downgraded to medical floor.    REVIEW OF SYSTEMS:  Trached, unable to obtain.    PHYSICAL EXAM:    Vital Signs Last 24 Hrs  T(C): 36.8 (29 Nov 2017 12:00), Max: 37.4 (29 Nov 2017 07:27)  T(F): 98.3 (29 Nov 2017 12:00), Max: 99.3 (29 Nov 2017 07:27)  HR: 97 (29 Nov 2017 12:00) (91 - 118)  BP: 121/70 (29 Nov 2017 12:00) (93/54 - 131/59)  BP(mean): 89 (29 Nov 2017 12:00) (70 - 91)  RR: 18 (29 Nov 2017 12:00) (11 - 39)  SpO2: 99% (29 Nov 2017 12:00) (94% - 100%)    GENERAL: NAD  HEENT: PERRL, +EOMI  NECK: Midline trach  CHEST/LUNG: Fair air movements, no rales or ronchi  HEART: S1S2+, Regular rate and rhythm; No murmurs  ABDOMEN: Soft, Nontender,  Bowel sounds present, PEG site clean.  EXTREMITIES:  s/p Left BKA  NEURO:  No focal deficit.    I&O's Summary    28 Nov 2017 07:01  -  29 Nov 2017 07:00  --------------------------------------------------------  IN: 1520 mL / OUT: 550 mL / NET: 970 mL    29 Nov 2017 07:01  -  29 Nov 2017 13:09  --------------------------------------------------------  IN: 360 mL / OUT: 0 mL / NET: 360 mL        MEDICATIONS  (STANDING):  ALBUTerol/ipratropium for Nebulization 3 milliLiter(s) Nebulizer every 6 hours  chlorhexidine 0.12% Liquid 15 milliLiter(s) Swish and Spit two times a day  digoxin     Tablet 0.125 milliGRAM(s) Oral daily  diltiazem    Tablet 60 milliGRAM(s) Oral every 6 hours  enoxaparin Injectable 40 milliGRAM(s) SubCutaneous every 24 hours  folic acid 1 milliGRAM(s) Oral daily  influenza   Vaccine 0.5 milliLiter(s) IntraMuscular once  lactulose Syrup 10 Gram(s) Oral daily  lisinopril 5 milliGRAM(s) Oral daily  metoprolol     tartrate 25 milliGRAM(s) Oral every 8 hours  multivitamin 1 Tablet(s) Oral daily  OLANZapine 2.5 milliGRAM(s) Oral daily  OLANZapine 5 milliGRAM(s) Oral at bedtime  pantoprazole   Suspension 40 milliGRAM(s) Oral daily  tamsulosin 0.4 milliGRAM(s) Oral at bedtime    MEDICATIONS  (PRN):  acetaminophen    Suspension 650 milliGRAM(s) Oral every 6 hours PRN For Temp greater than 38 C (100.4 F)  bisacodyl Suppository 10 milliGRAM(s) Rectal daily PRN Constipation  haloperidol    Injectable 2 milliGRAM(s) IV Push every 6 hours PRN agitation  metoprolol    tartrate Injectable 5 milliGRAM(s) IV Push every 6 hours PRN HR > 120            LABS:    11-28    135  |  98  |  20.0  ----------------------------<  135<H>  4.9   |  27.0  |  0.56    Ca    9.0      28 Nov 2017 05:18  Phos  3.3     11-28  Mg     1.9     11-28

## 2017-11-29 NOTE — PROGRESS NOTE ADULT - PROBLEM SELECTOR PROBLEM 6
Cirrhosis
Emphysema of lung
Atrial fibrillation with RVR
Cardiomyopathy, alcoholic
Cirrhosis
CHF (congestive heart failure)
CHF (congestive heart failure)
Cardiomyopathy, alcoholic
Cardiomyopathy, alcoholic
HTN (hypertension)
Emphysema of lung
Fever, unspecified fever cause
Atrial fibrillation with RVR

## 2017-11-29 NOTE — PROGRESS NOTE ADULT - PROBLEM SELECTOR PLAN 7
related to sepsis and SVTs, now resolved
still has some bursts of RVR and some NSVT today - lopressor redosed to 25mg q8 with parameters  cardiology following
titrating lopressor dose as needed, pt was on 100mg but doses held for lower BP a few days ago and have resumed at lower dose will continue to
Continue DuoNebs. Will add steroids for wheezing.
Currently rate controlled. Continue current regimen.
free h20 and 1/2 NS 75 ml/hr
on lactulose
Currently rate controlled. Continue current regimen.

## 2017-11-29 NOTE — PROVIDER CONTACT NOTE (OTHER) - BACKGROUND
pt vomited about 50 cc of tan emesis around 1200 today. Pt receiving glucerna at goal of 60 cc/hr with residuals assessed Q 4 hrs.

## 2017-11-29 NOTE — PROGRESS NOTE ADULT - PROBLEM SELECTOR PLAN 6
titrating lopressor dose as needed, pt was on 100mg but doses held for lower BP a few days ago and have resumed at lower dose will continue to tirate as HR/BP dictate
titrating lopressor dose as needed, pt was on 100mg but doses held for lower BP a few days ago and have resumed at lower dose will continue to tirate as HR/BP dictate
Cont off loading with lasix  Lopressor with good effect on rate control   Spoke with daughters tonight answered questions
Continue routine diuresis. HR and BP control with beta blockers- doses held yesterday as BP was on lower side, resumed Lopressor today at lower dose, should help with NSVT suppression
Continue routine diuresis. HR and BP control with metoprolol which has required some dose adjustment
stable on lactulose  no ascites noted
titrating lopressor dose as needed, pt was on 100mg but doses held for lower BP a few days ago and have resumed at lower dose will continue to tirate as HR/BP dictate
Continue routine diuresis. HR and BP control. Caution with IVF.
Continue with diuresis and HR/BP control. Caution with IVF.
controlled.  continue current medications.
ef 60% clinically slightly dry free H20 and IV fluids NS1/2
continue bronchodilators
stable, once extubated will need social work consult
Check UA and micro  CXR today  IF T>100.5 will repeat cultures and check cbc
Continue routine diuresis. HR and BP control. Caution with IVF.
rated controlled on Lopressor and Digoxin. No anticoagulation secondary to ETOH ,cirrhosis and bleeding risks.

## 2017-11-29 NOTE — PROGRESS NOTE ADULT - PROBLEM SELECTOR PROBLEM 7
Atrial fibrillation with RVR
Chronic obstructive pulmonary disease, unspecified COPD type
Hypernatremia
Liver cirrhosis
Hypotension

## 2017-11-29 NOTE — PROGRESS NOTE ADULT - SUBJECTIVE AND OBJECTIVE BOX
PULMONARY PROGRESS NOTE      [id number]    Patient is a 62y old  Male who presents with a chief complaint of Shortness of breath (30 Oct 2017 15:03)      INTERVAL HPI/OVERNIGHT EVENTS:  resting comfortably on t collar  no acute complaints  has 1;1  MEDICATIONS  (STANDING):  ALBUTerol/ipratropium for Nebulization 3 milliLiter(s) Nebulizer every 6 hours  chlorhexidine 0.12% Liquid 15 milliLiter(s) Swish and Spit two times a day  digoxin     Tablet 0.125 milliGRAM(s) Oral daily  diltiazem    Tablet 60 milliGRAM(s) Oral every 6 hours  enoxaparin Injectable 40 milliGRAM(s) SubCutaneous every 24 hours  folic acid 1 milliGRAM(s) Oral daily  influenza   Vaccine 0.5 milliLiter(s) IntraMuscular once  lactulose Syrup 10 Gram(s) Oral daily  lisinopril 5 milliGRAM(s) Oral daily  metoprolol     tartrate 25 milliGRAM(s) Oral every 8 hours  multivitamin 1 Tablet(s) Oral daily  OLANZapine 2.5 milliGRAM(s) Oral daily  OLANZapine 5 milliGRAM(s) Oral at bedtime  pantoprazole   Suspension 40 milliGRAM(s) Oral daily  tamsulosin 0.4 milliGRAM(s) Oral at bedtime      MEDICATIONS  (PRN):  acetaminophen    Suspension 650 milliGRAM(s) Oral every 6 hours PRN For Temp greater than 38 C (100.4 F)  bisacodyl Suppository 10 milliGRAM(s) Rectal daily PRN Constipation  haloperidol    Injectable 2 milliGRAM(s) IV Push every 6 hours PRN agitation  metoprolol    tartrate Injectable 5 milliGRAM(s) IV Push every 6 hours PRN HR > 120      Allergies    Ceclor (Rash)    Intolerances        PAST MEDICAL & SURGICAL HISTORY:  Falls  Meningitis  Collapsed lung  Alcohol withdrawal  Emphysema of lung  ETOH abuse  Cirrhosis  Afib  CHF (congestive heart failure)  Poor historian  Alcohol abuse  Chronic atrial fibrillation  S/P BKA (below knee amputation), left: secondary to worsening infection in lower leg. Had both ankle injuries from fall s/p repair - left had complication.  S/P BKA (below knee amputation) unilateral, left      SOCIAL HISTORY  Smoking History:       REVIEW OF SYSTEMS:    CONSTITUTIONAL:  No distress    HEENT:  Eyes:  No diplopia or blurred vision. ENT:  No earache, sore throat or runny nose.    CARDIOVASCULAR:  No pressure, squeezing, tightness, heaviness or aching about the chest; no palpitations.    RESPIRATORY:  see hpi    GASTROINTESTINAL:  No nausea, vomiting or diarrhea.    GENITOURINARY:  No dysuria, frequency or urgency.    NEUROLOGIC:  No paresthesias, fasciculations, seizures or weakness.    PSYCHIATRIC:  No disorder of thought or mood.    Vital Signs Last 24 Hrs  T(C): 36.9 (29 Nov 2017 15:00), Max: 37.4 (29 Nov 2017 07:27)  T(F): 98.5 (29 Nov 2017 15:00), Max: 99.3 (29 Nov 2017 07:27)  HR: 92 (29 Nov 2017 15:00) (91 - 118)  BP: 129/57 (29 Nov 2017 15:00) (93/54 - 131/59)  BP(mean): 82 (29 Nov 2017 15:00) (70 - 91)  RR: 22 (29 Nov 2017 15:00) (11 - 39)  SpO2: 100% (29 Nov 2017 15:00) (94% - 100%)    PHYSICAL EXAMINATION:    GENERAL: The patient is awake and alert in no apparent distress.     HEENT: Head is normocephalic and atraumatic. Extraocular muscles are intact. Mucous membranes are moist.    NECK: Supple.    LUNGS: moderate air entry bilat  without wheezing, rales or rhonchi; respirations unlabored    HEART: Regular rate and rhythm without murmur.    ABDOMEN: Soft, nontender, and nondistended.      EXTREMITIES:  L BKA.    NEUROLOGIC: Grossly intact.    LABS:    11-28    135  |  98  |  20.0  ----------------------------<  135<H>  4.9   |  27.0  |  0.56    Ca    9.0      28 Nov 2017 05:18  Phos  3.3     11-28  Mg     1.9     11-28                          MICROBIOLOGY:    RADIOLOGY & ADDITIONAL STUDIES:  Cxr and CT scan chest  reviewed  echo noted

## 2017-11-29 NOTE — PROGRESS NOTE ADULT - ASSESSMENT
chronic vent failure / tracheostomy  currently hemodynamics stable and weaning  post prolonged hospital course  [facility]  /   afib- presume not on AC secondary to cirrhosis/bleeding risk  etoh / cirrhosis / altered mental status- multifactorial /chronic  etoh related  COPD  continue nebs, weaning trials  cardiology evaluation  nutritional support  PT  prognosis guarded chronic vent failure / tracheostomy  currently hemodynamics stable and weaning  post prolonged hospital course  [facility]  /   afib- presume not on AC secondary to cirrhosis/bleeding risk- need re discuss with GI/ Cardio  etoh / cirrhosis / altered mental status- multifactorial /chronic  etoh related ( MRI neg CVA)  COPD  continue nebs, weaning trials  cardiology evaluation  nutritional support  PT  prognosis guarded chronic vent failure / tracheostomy  currently hemodynamics stable and weaning  post prolonged hospital course  [facility]  /   afib- presume not on AC secondary to cirrhosis/bleeding risk- need re discuss with GI/ Cardio  etoh / cirrhosis / altered mental status- multifactorial /chronic  etoh related ( MRI neg CVA)  COPD/ mediastinal , L hilar adenopathy  continue nebs, weaning trials  cardiology evaluation  nutritional support  PT  prognosis guarded

## 2017-11-29 NOTE — PROGRESS NOTE ADULT - ATTENDING COMMENTS
agree with above plan for transfer to floor with continued vent weaning may need vent weaning facility  afib rate controlled  pulm consulted for conitnue weaning

## 2017-11-29 NOTE — PROGRESS NOTE ADULT - ASSESSMENT
At this time the patient no longer requires ICU care and can be transferred to the Chronic Ventilator floor. Pulmonary will follow vent weaning.

## 2017-11-29 NOTE — PROGRESS NOTE ADULT - PROBLEM SELECTOR PROBLEM 5
Febrile
Chronic diastolic congestive heart failure
Chronic obstructive pulmonary disease, unspecified COPD type
Alcohol withdrawal delirium
Cardiomyopathy, alcoholic
Cardiomyopathy, alcoholic
Encephalopathy acute
Alcohol withdrawal
Alcohol withdrawal delirium
Altered mental status, unspecified altered mental status type
Aspiration pneumonia
Cardiomyopathy, alcoholic
Encephalopathy acute
Febrile
Fever, unspecified fever cause
Other dysphagia
Respiratory failure
Alcohol abuse
Alcohol withdrawal
Atrial fibrillation with RVR
Cirrhosis
Aspiration pneumonia
Chronic diastolic congestive heart failure
Sepsis

## 2017-11-30 PROCEDURE — 99233 SBSQ HOSP IP/OBS HIGH 50: CPT

## 2017-11-30 RX ORDER — SODIUM CHLORIDE 9 MG/ML
500 INJECTION INTRAMUSCULAR; INTRAVENOUS; SUBCUTANEOUS ONCE
Qty: 0 | Refills: 0 | Status: COMPLETED | OUTPATIENT
Start: 2017-11-30 | End: 2017-11-30

## 2017-11-30 RX ORDER — HALOPERIDOL DECANOATE 100 MG/ML
2 INJECTION INTRAMUSCULAR ONCE
Qty: 0 | Refills: 0 | Status: COMPLETED | OUTPATIENT
Start: 2017-11-30 | End: 2017-11-30

## 2017-11-30 RX ADMIN — Medication 1 MILLIGRAM(S): at 13:02

## 2017-11-30 RX ADMIN — LISINOPRIL 5 MILLIGRAM(S): 2.5 TABLET ORAL at 05:10

## 2017-11-30 RX ADMIN — PANTOPRAZOLE SODIUM 40 MILLIGRAM(S): 20 TABLET, DELAYED RELEASE ORAL at 13:04

## 2017-11-30 RX ADMIN — Medication 1 TABLET(S): at 13:03

## 2017-11-30 RX ADMIN — OLANZAPINE 5 MILLIGRAM(S): 15 TABLET, FILM COATED ORAL at 21:59

## 2017-11-30 RX ADMIN — Medication 3 MILLILITER(S): at 17:24

## 2017-11-30 RX ADMIN — Medication 0.12 MILLIGRAM(S): at 05:10

## 2017-11-30 RX ADMIN — HALOPERIDOL DECANOATE 2 MILLIGRAM(S): 100 INJECTION INTRAMUSCULAR at 21:59

## 2017-11-30 RX ADMIN — Medication 25 MILLIGRAM(S): at 05:11

## 2017-11-30 RX ADMIN — LACTULOSE 10 GRAM(S): 10 SOLUTION ORAL at 13:03

## 2017-11-30 RX ADMIN — Medication 3 MILLILITER(S): at 21:54

## 2017-11-30 RX ADMIN — Medication 3 MILLILITER(S): at 09:20

## 2017-11-30 RX ADMIN — TAMSULOSIN HYDROCHLORIDE 0.4 MILLIGRAM(S): 0.4 CAPSULE ORAL at 21:59

## 2017-11-30 RX ADMIN — ENOXAPARIN SODIUM 40 MILLIGRAM(S): 100 INJECTION SUBCUTANEOUS at 13:02

## 2017-11-30 RX ADMIN — OLANZAPINE 2.5 MILLIGRAM(S): 15 TABLET, FILM COATED ORAL at 13:02

## 2017-11-30 RX ADMIN — Medication 1 MILLIGRAM(S): at 05:08

## 2017-11-30 RX ADMIN — CHLORHEXIDINE GLUCONATE 15 MILLILITER(S): 213 SOLUTION TOPICAL at 05:09

## 2017-11-30 RX ADMIN — CHLORHEXIDINE GLUCONATE 15 MILLILITER(S): 213 SOLUTION TOPICAL at 18:55

## 2017-11-30 RX ADMIN — Medication 25 MILLIGRAM(S): at 22:06

## 2017-11-30 RX ADMIN — SODIUM CHLORIDE 2000 MILLILITER(S): 9 INJECTION INTRAMUSCULAR; INTRAVENOUS; SUBCUTANEOUS at 10:01

## 2017-11-30 NOTE — PROGRESS NOTE ADULT - PROBLEM SELECTOR PLAN 4
Will request cardiology follow up. On Cardizem and Digoxin, not on anticoagulation given liver cirrhosis and alcohol abuse. Will consider cardiology follow up. On Cardizem and Digoxin, not on anticoagulation given liver cirrhosis and alcohol abuse.

## 2017-11-30 NOTE — PROGRESS NOTE ADULT - ASSESSMENT
62 yr old male with atrial fibrillation, cirrhosis, CHF, alcohol abuse admitted with shortness of breath, admitted for COPD exacerbation, acute diastolic CHF and alcohol abuse, started on steroids, Librium protocol. Cardiology consulted. ECHO and stress test was ordered. He signed out AMA, returned to the hospital agitated, intoxicated. RRT was called later as he was in alcohol withdrawal, ICU was consulted, placed on Valium and Precedex, intubated for airway protection. He was given antibiotics for aspiration, he self extubated on 11/2, but was re intubated on 11/4 for airway protection and altered mental status. ECHO revealed cardiomyopathy. Mental status attributed to encephalopathy and alcohol withdrawal. EEG negative for seizures. He self extubated on 11/7, requiring reintubation. MRI brain unremarkable. He did not tolerate SBT given mental status and copious secretions. CT surgery was consulted for tracheostomy, which was performed on 11/10. Antibiotics changed to Aztreonam for pseudomonas pneumonia. He was transferred out of ICU, rate control medications adjusted for a fib with RVR.  He developed code sepsis on 11/13, he was readmitted to ICU for further management. GI consulted for PEG placement. Cardizem and Lopressor given for A fib with RVR. Medications started on encephalopathy. PEG placed on 11/22. He had episodes of fever, monitored off antibiotics. Digoxin was added for rate control. He was transferred out of medical ICU on 11/29. He developed hypotension on 11/30 after rate control medications were given.

## 2017-11-30 NOTE — PROGRESS NOTE ADULT - SUBJECTIVE AND OBJECTIVE BOX
INTERVAL HPI/OVERNIGHT EVENTS:    CC: s/p respiratory failure, s/p trach and PEG, aspiration pneumonia, alcohol abuse, atrial fibrillation    Chart and course reviewed. Episodes of hypotension today, per RN received rate control medications and ativan this am.     Vital Signs Last 24 Hrs  T(C): 36.9 (30 Nov 2017 08:00), Max: 37.3 (30 Nov 2017 03:46)  T(F): 98.5 (30 Nov 2017 08:00), Max: 99.1 (30 Nov 2017 03:46)  HR: 93 (30 Nov 2017 14:00) (78 - 142)  BP: 119/59 (30 Nov 2017 14:00) (66/45 - 129/57)  BP(mean): 85 (30 Nov 2017 14:00) (52 - 104)  RR: 24 (30 Nov 2017 14:00) (10 - 54)  SpO2: 100% (30 Nov 2017 14:00) (96% - 100%)    PHYSICAL EXAM:    GENERAL: Not in distress, drowsy but arousable, s/p trach  CHEST/LUNG: b/l air entry, coarse breath sounds.  HEART: irregular  ABDOMEN: Soft, BS+  EXTREMITIES:  no edema, left BKA, no tenderness.     MEDICATIONS  (STANDING):  ALBUTerol/ipratropium for Nebulization 3 milliLiter(s) Nebulizer every 6 hours  chlorhexidine 0.12% Liquid 15 milliLiter(s) Swish and Spit two times a day  digoxin     Tablet 0.125 milliGRAM(s) Oral daily  diltiazem    Tablet 60 milliGRAM(s) Oral every 6 hours  enoxaparin Injectable 40 milliGRAM(s) SubCutaneous every 24 hours  folic acid 1 milliGRAM(s) Oral daily  influenza   Vaccine 0.5 milliLiter(s) IntraMuscular once  lactulose Syrup 10 Gram(s) Oral daily  lisinopril 5 milliGRAM(s) Oral daily  metoprolol     tartrate 25 milliGRAM(s) Oral every 8 hours  multivitamin 1 Tablet(s) Oral daily  OLANZapine 2.5 milliGRAM(s) Oral daily  OLANZapine 5 milliGRAM(s) Oral at bedtime  pantoprazole   Suspension 40 milliGRAM(s) Oral daily  tamsulosin 0.4 milliGRAM(s) Oral at bedtime    MEDICATIONS  (PRN):  acetaminophen    Suspension 650 milliGRAM(s) Oral every 6 hours PRN For Temp greater than 38 C (100.4 F)  bisacodyl Suppository 10 milliGRAM(s) Rectal daily PRN Constipation  haloperidol    Injectable 2 milliGRAM(s) IV Push every 6 hours PRN agitation  metoprolol    tartrate Injectable 5 milliGRAM(s) IV Push every 6 hours PRN HR > 120      Allergies    Ceclor (Rash)    Intolerances          LABS:                  RADIOLOGY & ADDITIONAL TESTS:

## 2017-12-01 LAB
ANION GAP SERPL CALC-SCNC: 11 MMOL/L — SIGNIFICANT CHANGE UP (ref 5–17)
BUN SERPL-MCNC: 41 MG/DL — HIGH (ref 8–20)
CALCIUM SERPL-MCNC: 8.7 MG/DL — SIGNIFICANT CHANGE UP (ref 8.6–10.2)
CHLORIDE SERPL-SCNC: 104 MMOL/L — SIGNIFICANT CHANGE UP (ref 98–107)
CO2 SERPL-SCNC: 25 MMOL/L — SIGNIFICANT CHANGE UP (ref 22–29)
CREAT SERPL-MCNC: 0.57 MG/DL — SIGNIFICANT CHANGE UP (ref 0.5–1.3)
GLUCOSE BLDC GLUCOMTR-MCNC: 103 MG/DL — HIGH (ref 70–99)
GLUCOSE SERPL-MCNC: 109 MG/DL — SIGNIFICANT CHANGE UP (ref 70–115)
HCT VFR BLD CALC: 32.1 % — LOW (ref 42–52)
HGB BLD-MCNC: 10.7 G/DL — LOW (ref 14–18)
MAGNESIUM SERPL-MCNC: 1.9 MG/DL — SIGNIFICANT CHANGE UP (ref 1.6–2.6)
MCHC RBC-ENTMCNC: 30 PG — SIGNIFICANT CHANGE UP (ref 27–31)
MCHC RBC-ENTMCNC: 33.3 G/DL — SIGNIFICANT CHANGE UP (ref 32–36)
MCV RBC AUTO: 89.9 FL — SIGNIFICANT CHANGE UP (ref 80–94)
PHOSPHATE SERPL-MCNC: 3.9 MG/DL — SIGNIFICANT CHANGE UP (ref 2.4–4.7)
PLATELET # BLD AUTO: 161 K/UL — SIGNIFICANT CHANGE UP (ref 150–400)
POTASSIUM SERPL-MCNC: 5.4 MMOL/L — HIGH (ref 3.5–5.3)
POTASSIUM SERPL-SCNC: 5.4 MMOL/L — HIGH (ref 3.5–5.3)
RBC # BLD: 3.57 M/UL — LOW (ref 4.6–6.2)
RBC # FLD: 15.6 % — SIGNIFICANT CHANGE UP (ref 11–15.6)
SODIUM SERPL-SCNC: 140 MMOL/L — SIGNIFICANT CHANGE UP (ref 135–145)
WBC # BLD: 8.6 K/UL — SIGNIFICANT CHANGE UP (ref 4.8–10.8)
WBC # FLD AUTO: 8.6 K/UL — SIGNIFICANT CHANGE UP (ref 4.8–10.8)

## 2017-12-01 PROCEDURE — 99233 SBSQ HOSP IP/OBS HIGH 50: CPT

## 2017-12-01 PROCEDURE — 99232 SBSQ HOSP IP/OBS MODERATE 35: CPT

## 2017-12-01 RX ADMIN — Medication 1 MILLIGRAM(S): at 01:27

## 2017-12-01 RX ADMIN — LISINOPRIL 5 MILLIGRAM(S): 2.5 TABLET ORAL at 05:32

## 2017-12-01 RX ADMIN — Medication 3 MILLILITER(S): at 21:11

## 2017-12-01 RX ADMIN — OLANZAPINE 5 MILLIGRAM(S): 15 TABLET, FILM COATED ORAL at 21:31

## 2017-12-01 RX ADMIN — Medication 1 TABLET(S): at 12:40

## 2017-12-01 RX ADMIN — Medication 3 MILLILITER(S): at 03:43

## 2017-12-01 RX ADMIN — OLANZAPINE 2.5 MILLIGRAM(S): 15 TABLET, FILM COATED ORAL at 17:54

## 2017-12-01 RX ADMIN — ENOXAPARIN SODIUM 40 MILLIGRAM(S): 100 INJECTION SUBCUTANEOUS at 12:40

## 2017-12-01 RX ADMIN — Medication 1 MILLIGRAM(S): at 12:40

## 2017-12-01 RX ADMIN — TAMSULOSIN HYDROCHLORIDE 0.4 MILLIGRAM(S): 0.4 CAPSULE ORAL at 21:31

## 2017-12-01 RX ADMIN — HALOPERIDOL DECANOATE 2 MILLIGRAM(S): 100 INJECTION INTRAMUSCULAR at 09:22

## 2017-12-01 RX ADMIN — Medication 0.12 MILLIGRAM(S): at 05:32

## 2017-12-01 RX ADMIN — Medication 3 MILLILITER(S): at 15:26

## 2017-12-01 RX ADMIN — PANTOPRAZOLE SODIUM 40 MILLIGRAM(S): 20 TABLET, DELAYED RELEASE ORAL at 12:50

## 2017-12-01 RX ADMIN — LACTULOSE 10 GRAM(S): 10 SOLUTION ORAL at 12:40

## 2017-12-01 RX ADMIN — Medication 25 MILLIGRAM(S): at 05:32

## 2017-12-01 RX ADMIN — Medication 3 MILLILITER(S): at 09:14

## 2017-12-01 NOTE — PROGRESS NOTE ADULT - SUBJECTIVE AND OBJECTIVE BOX
PULMONARY PROGRESS NOTE      STONE DIAZ  MRN-[id number]    Patient is a 62y old  Male who presents with a chief complaint of Shortness of breath (30 Oct 2017 15:03)      INTERVAL HPI/OVERNIGHT EVENTS:    Patient is awake and alert  Comfortable on TC    MEDICATIONS  (STANDING):  ALBUTerol/ipratropium for Nebulization 3 milliLiter(s) Nebulizer every 6 hours  chlorhexidine 0.12% Liquid 15 milliLiter(s) Swish and Spit two times a day  digoxin     Tablet 0.125 milliGRAM(s) Oral daily  diltiazem    Tablet 60 milliGRAM(s) Oral every 6 hours  enoxaparin Injectable 40 milliGRAM(s) SubCutaneous every 24 hours  folic acid 1 milliGRAM(s) Oral daily  influenza   Vaccine 0.5 milliLiter(s) IntraMuscular once  lactulose Syrup 10 Gram(s) Oral daily  lisinopril 5 milliGRAM(s) Oral daily  metoprolol     tartrate 25 milliGRAM(s) Oral every 8 hours  multivitamin 1 Tablet(s) Oral daily  OLANZapine 2.5 milliGRAM(s) Oral daily  OLANZapine 5 milliGRAM(s) Oral at bedtime  pantoprazole   Suspension 40 milliGRAM(s) Oral daily  tamsulosin 0.4 milliGRAM(s) Oral at bedtime      MEDICATIONS  (PRN):  acetaminophen    Suspension 650 milliGRAM(s) Oral every 6 hours PRN For Temp greater than 38 C (100.4 F)  bisacodyl Suppository 10 milliGRAM(s) Rectal daily PRN Constipation  haloperidol    Injectable 2 milliGRAM(s) IV Push every 6 hours PRN agitation  metoprolol    tartrate Injectable 5 milliGRAM(s) IV Push every 6 hours PRN HR > 120      Allergies    Ceclor (Rash)    Intolerances        PAST MEDICAL & SURGICAL HISTORY:  Falls  Meningitis  Collapsed lung  Alcohol withdrawal  Emphysema of lung  ETOH abuse  Cirrhosis  Afib  CHF (congestive heart failure)  Poor historian  Alcohol abuse  Chronic atrial fibrillation  S/P BKA (below knee amputation), left: secondary to worsening infection in lower leg. Had both ankle injuries from fall s/p repair - left had complication.  S/P BKA (below knee amputation) unilateral, left        REVIEW OF SYSTEMS: Does not provide    Vital Signs Last 24 Hrs  T(C): 37.1 (01 Dec 2017 16:06), Max: 37.1 (01 Dec 2017 11:00)  T(F): 98.7 (01 Dec 2017 16:06), Max: 98.8 (01 Dec 2017 11:00)  HR: 101 (01 Dec 2017 16:06) (65 - 118)  BP: 114/57 (01 Dec 2017 16:06) (99/62 - 135/77)  BP(mean): --  RR: 18 (01 Dec 2017 16:06) (18 - 41)  SpO2: 100% (01 Dec 2017 11:00) (97% - 100%)    PHYSICAL EXAMINATION:    GENERAL: The patient is awake and alert in no apparent distress.     HEENT: Head is normocephalic and atraumatic. Extraocular muscles are intact. Mucous membranes are moist.    NECK: Supple. + trach    LUNGS: Mild rhonchi without wheezing, rales; respirations unlabored    HEART: Regular rate and rhythm without murmur.    ABDOMEN: Soft, nontender, and nondistended.      EXTREMITIES: Without any cyanosis, clubbing, rash, lesions or edema. L BKA    NEUROLOGIC: Confused.    LABS:                        10.7   8.6   )-----------( 161      ( 01 Dec 2017 09:34 )             32.1     12-01    140  |  104  |  41.0<H>  ----------------------------<  109  5.4<H>   |  25.0  |  0.57    Ca    8.7      01 Dec 2017 09:34  Phos  3.9     12-01  Mg     1.9     12-01      MICROBIOLOGY:    Culture - Sputum . (11.18.17 @ 15:12)    Gram Stain:   Moderate WBC's  Numerous Gram Negative Rods    -  Amikacin: S <=16    -  Aztreonam: I 16    -  Cefepime: I 16    -  Ceftazidime: R >16    -  Ciprofloxacin: S <=1    -  Gentamicin: S <=4    -  Imipenem: S <=1    -  Levofloxacin: S <=2    -  Meropenem: S 2    -  Piperacillin/Tazobactam: S <=16    -  Tobramycin: S <=4    Specimen Source: .Sputum Sputum    Culture Results:   Numerous Pseudomonas aeruginosa  Moderate Routine respiratory raymundo present    Organism Identification: Pseudomonas aeruginosa    Organism: Pseudomonas aeruginosa    Method Type: [facility]        RADIOLOGY & ADDITIONAL STUDIES:     EXAM:  CHEST SINGLE VIEW FRONTAL                          PROCEDURE DATE:  11/20/2017          INTERPRETATION:  Respiratory distress.    AP chest. Prior 11/17/2017.    Nasogastric tube, tracheostomy cannula in position. No change heart   mediastinum.    There is vascular congestion diffuse bilateral interstitial edema. No   gross focal infiltrate. Possible small left pleural effusion.    Impression: As above      CRISTA KHAN M.D., ATTENDING RADIOLOGIST  This document has been electronically signed. Nov 20 2017  2:51PM      ECHO:     Summary:   1. Severely increased left ventricular internal cavity size.   2. Normal global left ventricular systolic function. Left ventricular   ejection fraction, by visual estimation, is 55 to 60%.   3. Severely dilated right atrium.   4. Severely enlarged left atrium.   5. There is anterior mitral leaflet prolapse. There is moderate to   severe posteriorly directed MR.   6. There is no evidence of pericardial effusion.   7. ** Compared to TTE dated 10/31/17, no significant changes.     Meera Meade DO Electronically signed on 11/6/2017 at 2:43:32 PM

## 2017-12-01 NOTE — PROGRESS NOTE ADULT - SUBJECTIVE AND OBJECTIVE BOX
INTERVAL HPI/OVERNIGHT EVENTS:    CC: atrial fibrillation, alcohol abuse, s/p trach and PEG, s/p aspiration pneumonia    No overnight events, BP stable. No fever. More alert.     Vital Signs Last 24 Hrs  T(C): 36.7 (01 Dec 2017 06:00), Max: 37 (30 Nov 2017 20:06)  T(F): 98.1 (01 Dec 2017 06:00), Max: 98.6 (30 Nov 2017 20:06)  HR: 112 (01 Dec 2017 06:00) (65 - 118)  BP: 135/77 (01 Dec 2017 06:00) (74/44 - 135/77)  BP(mean): 75 (30 Nov 2017 18:00) (62 - 85)  RR: 24 (01 Dec 2017 06:00) (10 - 46)  SpO2: 97% (01 Dec 2017 08:27) (97% - 100%)    PHYSICAL EXAM:    GENERAL: alert, follows commands, s/p trach, restless  CHEST/LUNG: b/l air entry, clear  HEART: irregular  ABDOMEN: Soft, BS+  EXTREMITIES:  No edema, tenderness, left aka.    MEDICATIONS  (STANDING):  ALBUTerol/ipratropium for Nebulization 3 milliLiter(s) Nebulizer every 6 hours  chlorhexidine 0.12% Liquid 15 milliLiter(s) Swish and Spit two times a day  digoxin     Tablet 0.125 milliGRAM(s) Oral daily  diltiazem    Tablet 60 milliGRAM(s) Oral every 6 hours  enoxaparin Injectable 40 milliGRAM(s) SubCutaneous every 24 hours  folic acid 1 milliGRAM(s) Oral daily  influenza   Vaccine 0.5 milliLiter(s) IntraMuscular once  lactulose Syrup 10 Gram(s) Oral daily  lisinopril 5 milliGRAM(s) Oral daily  metoprolol     tartrate 25 milliGRAM(s) Oral every 8 hours  multivitamin 1 Tablet(s) Oral daily  OLANZapine 2.5 milliGRAM(s) Oral daily  OLANZapine 5 milliGRAM(s) Oral at bedtime  pantoprazole   Suspension 40 milliGRAM(s) Oral daily  tamsulosin 0.4 milliGRAM(s) Oral at bedtime    MEDICATIONS  (PRN):  acetaminophen    Suspension 650 milliGRAM(s) Oral every 6 hours PRN For Temp greater than 38 C (100.4 F)  bisacodyl Suppository 10 milliGRAM(s) Rectal daily PRN Constipation  haloperidol    Injectable 2 milliGRAM(s) IV Push every 6 hours PRN agitation  metoprolol    tartrate Injectable 5 milliGRAM(s) IV Push every 6 hours PRN HR > 120      Allergies    Ceclor (Rash)    Intolerances          LABS:                          10.7   8.6   )-----------( x        ( 01 Dec 2017 09:34 )             32.1                 RADIOLOGY & ADDITIONAL TESTS:

## 2017-12-01 NOTE — PROGRESS NOTE ADULT - ASSESSMENT
Chronic vent failure / tracheostomy  Tolerating TC  S/p prolonged hospital course  CHf with normal EF  AF - presume not on AC secondary to cirrhosis/bleeding risk given mild coagulopathy - would leave to discretion of cardio/GI  ETOH / cirrhosis / altered mental status- multifactorial but likely chronic ETOH related ( MRI neg CVA)  COPD  Mediastinal and L hilar adenopathy    Plan:  continue nebs  Wean as able  TC daytime; vent as needed  Consider cardiology evaluation  Optimize cardiac function  Rate control  Nutritional support  PT/mobilze  Prognosis guarded  Follow chest CT for [id number] - consider bx if persists  Trach care  GI/DVT prophylaxis

## 2017-12-01 NOTE — PROGRESS NOTE ADULT - ASSESSMENT
62 yr old male with atrial fibrillation, cirrhosis, CHF, alcohol abuse admitted with shortness of breath, admitted for COPD exacerbation, acute diastolic CHF and alcohol abuse, started on steroids, Librium protocol. Cardiology consulted. ECHO and stress test was ordered. He signed out AMA, returned to the hospital agitated, intoxicated. RRT was called later as he was in alcohol withdrawal, ICU was consulted, placed on Valium and Precedex, intubated for airway protection. He was given antibiotics for aspiration, he self extubated on 11/2, but was re intubated on 11/4 for airway protection and altered mental status. ECHO revealed cardiomyopathy. Mental status attributed to encephalopathy and alcohol withdrawal. EEG negative for seizures. He self extubated on 11/7, requiring reintubation. MRI brain unremarkable. He did not tolerate SBT given mental status and copious secretions. CT surgery was consulted for tracheostomy, which was performed on 11/10. Antibiotics changed to Aztreonam for pseudomonas pneumonia. He was transferred out of ICU, rate control medications adjusted for a fib with RVR.  He developed code sepsis on 11/13, he was readmitted to ICU for further management. GI consulted for PEG placement. Cardizem and Lopressor given for A fib with RVR. Medications started on encephalopathy. PEG placed on 11/22. He had episodes of fever, monitored off antibiotics. Digoxin was added for rate control. He was transferred out of medical ICU on 11/29. He developed hypotension on 11/30 after rate control medications were given. BP improved post fluids, heart rate improved.

## 2017-12-01 NOTE — PROGRESS NOTE ADULT - PROBLEM SELECTOR PLAN 4
Rate better controlled, on Digoxin and Cardizem, not a candidate for anticoagulation given alcohol abuse.

## 2017-12-02 LAB
ANION GAP SERPL CALC-SCNC: 10 MMOL/L — SIGNIFICANT CHANGE UP (ref 5–17)
BUN SERPL-MCNC: 31 MG/DL — HIGH (ref 8–20)
CALCIUM SERPL-MCNC: 8.9 MG/DL — SIGNIFICANT CHANGE UP (ref 8.6–10.2)
CHLORIDE SERPL-SCNC: 102 MMOL/L — SIGNIFICANT CHANGE UP (ref 98–107)
CO2 SERPL-SCNC: 27 MMOL/L — SIGNIFICANT CHANGE UP (ref 22–29)
CREAT SERPL-MCNC: 0.61 MG/DL — SIGNIFICANT CHANGE UP (ref 0.5–1.3)
GLUCOSE SERPL-MCNC: 100 MG/DL — SIGNIFICANT CHANGE UP (ref 70–115)
HCT VFR BLD CALC: 30.3 % — LOW (ref 42–52)
HGB BLD-MCNC: 9.6 G/DL — LOW (ref 14–18)
MAGNESIUM SERPL-MCNC: 1.9 MG/DL — SIGNIFICANT CHANGE UP (ref 1.6–2.6)
MCHC RBC-ENTMCNC: 29.6 PG — SIGNIFICANT CHANGE UP (ref 27–31)
MCHC RBC-ENTMCNC: 31.7 G/DL — LOW (ref 32–36)
MCV RBC AUTO: 93.5 FL — SIGNIFICANT CHANGE UP (ref 80–94)
PHOSPHATE SERPL-MCNC: 4 MG/DL — SIGNIFICANT CHANGE UP (ref 2.4–4.7)
PLATELET # BLD AUTO: 254 K/UL — SIGNIFICANT CHANGE UP (ref 150–400)
POTASSIUM SERPL-MCNC: 4.2 MMOL/L — SIGNIFICANT CHANGE UP (ref 3.5–5.3)
POTASSIUM SERPL-SCNC: 4.2 MMOL/L — SIGNIFICANT CHANGE UP (ref 3.5–5.3)
RBC # BLD: 3.24 M/UL — LOW (ref 4.6–6.2)
RBC # FLD: 14.9 % — SIGNIFICANT CHANGE UP (ref 11–15.6)
SODIUM SERPL-SCNC: 139 MMOL/L — SIGNIFICANT CHANGE UP (ref 135–145)
WBC # BLD: 6.7 K/UL — SIGNIFICANT CHANGE UP (ref 4.8–10.8)
WBC # FLD AUTO: 6.7 K/UL — SIGNIFICANT CHANGE UP (ref 4.8–10.8)

## 2017-12-02 PROCEDURE — 99222 1ST HOSP IP/OBS MODERATE 55: CPT | Mod: GC

## 2017-12-02 PROCEDURE — 99233 SBSQ HOSP IP/OBS HIGH 50: CPT

## 2017-12-02 RX ORDER — HALOPERIDOL DECANOATE 100 MG/ML
2 INJECTION INTRAMUSCULAR EVERY 6 HOURS
Qty: 0 | Refills: 0 | Status: DISCONTINUED | OUTPATIENT
Start: 2017-12-02 | End: 2017-12-14

## 2017-12-02 RX ORDER — SODIUM CHLORIDE 9 MG/ML
500 INJECTION INTRAMUSCULAR; INTRAVENOUS; SUBCUTANEOUS ONCE
Qty: 0 | Refills: 0 | Status: COMPLETED | OUTPATIENT
Start: 2017-12-02 | End: 2017-12-02

## 2017-12-02 RX ORDER — SODIUM CHLORIDE 9 MG/ML
500 INJECTION INTRAMUSCULAR; INTRAVENOUS; SUBCUTANEOUS ONCE
Qty: 0 | Refills: 0 | Status: DISCONTINUED | OUTPATIENT
Start: 2017-12-02 | End: 2017-12-02

## 2017-12-02 RX ADMIN — Medication 25 MILLIGRAM(S): at 05:31

## 2017-12-02 RX ADMIN — LACTULOSE 10 GRAM(S): 10 SOLUTION ORAL at 12:56

## 2017-12-02 RX ADMIN — Medication 3 MILLILITER(S): at 15:36

## 2017-12-02 RX ADMIN — PANTOPRAZOLE SODIUM 40 MILLIGRAM(S): 20 TABLET, DELAYED RELEASE ORAL at 12:57

## 2017-12-02 RX ADMIN — Medication 1 TABLET(S): at 12:56

## 2017-12-02 RX ADMIN — Medication 3 MILLILITER(S): at 08:44

## 2017-12-02 RX ADMIN — OLANZAPINE 5 MILLIGRAM(S): 15 TABLET, FILM COATED ORAL at 21:02

## 2017-12-02 RX ADMIN — LISINOPRIL 5 MILLIGRAM(S): 2.5 TABLET ORAL at 05:31

## 2017-12-02 RX ADMIN — HALOPERIDOL DECANOATE 2 MILLIGRAM(S): 100 INJECTION INTRAMUSCULAR at 16:12

## 2017-12-02 RX ADMIN — SODIUM CHLORIDE 2000 MILLILITER(S): 9 INJECTION INTRAMUSCULAR; INTRAVENOUS; SUBCUTANEOUS at 09:42

## 2017-12-02 RX ADMIN — TAMSULOSIN HYDROCHLORIDE 0.4 MILLIGRAM(S): 0.4 CAPSULE ORAL at 21:02

## 2017-12-02 RX ADMIN — OLANZAPINE 2.5 MILLIGRAM(S): 15 TABLET, FILM COATED ORAL at 12:57

## 2017-12-02 RX ADMIN — Medication 1 MILLIGRAM(S): at 12:57

## 2017-12-02 RX ADMIN — Medication 3 MILLILITER(S): at 21:21

## 2017-12-02 RX ADMIN — Medication 3 MILLILITER(S): at 03:24

## 2017-12-02 RX ADMIN — HALOPERIDOL DECANOATE 2 MILLIGRAM(S): 100 INJECTION INTRAMUSCULAR at 21:14

## 2017-12-02 RX ADMIN — ENOXAPARIN SODIUM 40 MILLIGRAM(S): 100 INJECTION SUBCUTANEOUS at 12:56

## 2017-12-02 RX ADMIN — Medication 0.12 MILLIGRAM(S): at 05:31

## 2017-12-02 NOTE — CHART NOTE - NSCHARTNOTEFT_GEN_A_CORE
Source: EMR - Pt agitated s/p trach and PEG     Current Diet: NPO with TF      Enteral /Parenteral Nutrition: Glucerna 1.5 @60ml/hr x24hr     Pt pulling on tubes, feedings on hold     Current Weight: (11/30/17) 98kg- stable       Pertinent Medications: MEDICATIONS  (STANDING):  ALBUTerol/ipratropium for Nebulization 3 milliLiter(s) Nebulizer every 6 hours  chlorhexidine 0.12% Liquid 15 milliLiter(s) Swish and Spit two times a day  digoxin     Tablet 0.125 milliGRAM(s) Oral daily  diltiazem    Tablet 60 milliGRAM(s) Oral every 6 hours  enoxaparin Injectable 40 milliGRAM(s) SubCutaneous every 24 hours  folic acid 1 milliGRAM(s) Oral daily  influenza   Vaccine 0.5 milliLiter(s) IntraMuscular once  lactulose Syrup 10 Gram(s) Oral daily  lisinopril 5 milliGRAM(s) Oral daily  metoprolol     tartrate 25 milliGRAM(s) Oral every 8 hours  multivitamin 1 Tablet(s) Oral daily  OLANZapine 2.5 milliGRAM(s) Oral daily  OLANZapine 5 milliGRAM(s) Oral at bedtime  pantoprazole   Suspension 40 milliGRAM(s) Oral daily  tamsulosin 0.4 milliGRAM(s) Oral at bedtime    MEDICATIONS  (PRN):  acetaminophen    Suspension 650 milliGRAM(s) Oral every 6 hours PRN For Temp greater than 38 C (100.4 F)  bisacodyl Suppository 10 milliGRAM(s) Rectal daily PRN Constipation  haloperidol    Injectable 2 milliGRAM(s) IV Push every 6 hours PRN agitation  metoprolol    tartrate Injectable 5 milliGRAM(s) IV Push every 6 hours PRN HR > 120    Pertinent Labs: CBC Full  -  ( 02 Dec 2017 08:55 )  WBC Count : 6.7 K/uL  Hemoglobin : 9.6 g/dL  Hematocrit : 30.3 %  Platelet Count - Automated : 254 K/uL  Mean Cell Volume : 93.5 fl  Mean Cell Hemoglobin : 29.6 pg  Mean Cell Hemoglobin Concentration : 31.7 g/dL      Skin: Intact     Estimated Needs:   [X] no change since previous assessment  [ ] recalculated:     Current Nutrition Diagnosis: Pt at nutritional risk secondary to increased nutrient needs related to ETOH withdrawal, respiratory failure, s/p trach as evidenced by increased estimated nutrient needs to optimize nutritional status.    Recommendations: Continue TF as tolerated     Monitoring and Evaluation:    [X] Weights  [X] Follow up per protocol [X] Labs:

## 2017-12-02 NOTE — CONSULT NOTE ADULT - SUBJECTIVE AND OBJECTIVE BOX
HPI:  Mr. Boone is a 62 year old male with a PMHx of atrial fibrillation (not on AC due to falls), CHF, COPD and EtOH abuse who was brought in by EMS with complaints of shortness of breath. Patient was drinking alcohol earlier that day when he started having difficulty breathing prompting him to call EMS. Started on steroids, Librium protocol. He initially signed out AMA but returned to the hospital agitated and intoxicated. Rapid response was called for alcohol withdrawal, requiring transfer to the ICU. He was placed on Valium and Precedex and intubated for airway protection. Started on antibiotics for aspiration pneumonia. Self extubated 11/2, but re-intubated 11/4. TTE showed cardiomyopathy. EEG negative for seizures, altered mental status believed to be related to encephalopathy and alcohol withdrawal. Self extubated once again 11/7, requiring reintubation. MRI brain unremarkable. s/p tracheostomy on 11/10. Antibiotics adjusted to Aztreonam for Pseudomonas pneumonia. Had episodes of a-fib with RVR requiring medication adjustments and addition of Digoxin. Code sepsis on 11/13. s/p PEG placement on 11/22. Had episodes of hypotension for which medications were once again adjusted.     Today,    REVIEW OF SYSTEMS    PAST MEDICAL & SURGICAL HISTORY  Hypertension  Cirrhosis  Chronic obstructive pulmonary disease  Chronic diastolic congestive heart failure  Atrial fibrillation  s/p left BKA     SOCIAL HISTORY  Smoking - 2ppd  EtOH - Denied   Drugs - Denied    FUNCTIONAL HISTORY  _______ hand dominant  Lives alone in a home with 1 LETY + HR and no stairs inside  Prior to hospitalization utilized left prosthesis and SAC for assistance with ambulation.     CURRENT FUNCTIONAL STATUS  Bed mobility - Min A x 2 using bedrails  Transfers - Max A x 2 using RW    FAMILY HISTORY   Reviewed and non-contributory    ALLERGIES  Ceclor     VITALS  T(C): 36.9 (12-02-17 @ 07:14)  T(F): 98.4 (12-02-17 @ 07:14), Max: 98.8 (12-01-17 @ 11:00)  HR: 74 (12-02-17 @ 07:45) (65 - 101)  BP: 100/60 (12-02-17 @ 09:42) (77/46 - 114/57)  RR:  (18 - 36)  SpO2:  (100% - 100%)  Wt(kg): --    PHYSICAL EXAM    RECENT LABS/IMAGING             9.6    6.7   )-----------( 254      ( 02 Dec 2017 08:55 )             30.3     139  |  102  |  31.0<H>  ----------------------------<  100     ( 02 Dec 2017 )  4.2   |  27.0  |  0.61    Ca    8.9      02 Dec 2017 08:53  Phos  4.0     12-02  Mg     1.9     12-02    Tri - 79     MEDICATIONS   MEDICATIONS  (STANDING):  ALBUTerol/ipratropium for Nebulization 3 milliLiter(s) Nebulizer every 6 hours  chlorhexidine 0.12% Liquid 15 milliLiter(s) Swish and Spit two times a day  digoxin     Tablet 0.125 milliGRAM(s) Oral daily  diltiazem    Tablet 60 milliGRAM(s) Oral every 6 hours  enoxaparin Injectable 40 milliGRAM(s) SubCutaneous every 24 hours  folic acid 1 milliGRAM(s) Oral daily  lactulose Syrup 10 Gram(s) Oral daily  lisinopril 5 milliGRAM(s) Oral daily  metoprolol     tartrate 25 milliGRAM(s) Oral every 8 hours  multivitamin 1 Tablet(s) Oral daily  OLANZapine 2.5 milliGRAM(s) Oral daily  OLANZapine 5 milliGRAM(s) Oral at bedtime  pantoprazole   Suspension 40 milliGRAM(s) Oral daily  tamsulosin 0.4 milliGRAM(s) Oral at bedtime    MEDICATIONS  (PRN):  acetaminophen    Suspension 650 milliGRAM(s) Oral every 6 hours PRN For Temp greater than 38 C (100.4 F)  bisacodyl Suppository 10 milliGRAM(s) Rectal daily PRN Constipation  haloperidol    Injectable 2 milliGRAM(s) IV Push every 6 hours PRN agitation  metoprolol    tartrate Injectable 5 milliGRAM(s) IV Push every 6 hours PRN HR > 120    ASSESSMENT/PLAN  61 y/o male with encephalopathy which may be related to alcohol withdrawal. Course complicated with respiratory failure and pneumonia requiring intubation and eventual tracheostomy. He is with endurance, breathing, swallowing, functional, gait, ADL impairments.    Disposition -  PT - ROM, Bed mobility, Transfers, Ambulation with assistive device  OT - ADLs, ROM  SLP - Dysphagia eval and treat  Precautions - Falls, Cardiac  Safety - 1:1 supervision  Mood - Zyprexa BID  DVT Prophylaxis - Lovenox, SCD's  Weight bearing status - Full weight bearing  Skin - Turn Q2hrs  Diet - NPO on tube feeds HPI:  Mr. Boone is a 62 year old male with a PMHx of atrial fibrillation (not on AC due to falls), CHF, COPD and EtOH abuse who was brought in by EMS with complaints of shortness of breath. Patient was drinking alcohol earlier that day when he started having difficulty breathing prompting him to call EMS. Started on steroids, Librium protocol. He initially signed out AMA but returned to the hospital agitated and intoxicated. Rapid response was called for alcohol withdrawal, requiring transfer to the ICU. He was placed on Valium and Precedex and intubated for airway protection. Started on antibiotics for aspiration pneumonia. Self extubated 11/2, but re-intubated 11/4. TTE showed cardiomyopathy. EEG negative for seizures, altered mental status believed to be related to encephalopathy and alcohol withdrawal. Self extubated once again 11/7, requiring reintubation. MRI brain unremarkable. s/p tracheostomy on 11/10. Antibiotics adjusted to Aztreonam for Pseudomonas pneumonia. Had episodes of a-fib with RVR requiring medication adjustments and addition of Digoxin. Code sepsis on 11/13. s/p PEG placement on 11/22. Had episodes of hypotension for which medications were once again adjusted.     Today, he is lethargic, able to open eyes intermittently to vocal stimuli. Follows simple commands intermittently such as raising hands and grasping. Remains on 1:1. Per nursing on CPAP overnight but tolerated trach collar during day well yesterday without desaturations.     REVIEW OF SYSTEMS  Unable to obtain secondary to level of arousal and trach    PAST MEDICAL & SURGICAL HISTORY (from documentation)  Hypertension  Cirrhosis  Chronic obstructive pulmonary disease  Chronic diastolic congestive heart failure  Atrial fibrillation  s/p left BKA     SOCIAL HISTORY (from documentation)  Smoking - 2ppd  EtOH - Denied   Drugs - Denied    FUNCTIONAL HISTORY (from documentation)  Lives alone in a home with 1 LETY + HR and no stairs inside  Prior to hospitalization utilized left prosthesis and SAC for assistance with ambulation.     CURRENT FUNCTIONAL STATUS  Bed mobility - Min A x 2 using bedrails  Transfers - Max A x 2 using RW    FAMILY HISTORY   Reviewed and non-contributory    ALLERGIES  Ceclor     VITALS  T(C): 36.9 (12-02-17 @ 07:14)  T(F): 98.4 (12-02-17 @ 07:14), Max: 98.7 (12-01-17 @ 16:06)  HR: 74 (12-02-17 @ 07:45) (65 - 101)  BP: 100/60 (12-02-17 @ 09:42) (77/46 - 114/57)  RR:  (18 - 36)  SpO2:  (100% - 100%)  Wt(kg): --    PHYSICAL EXAM  Constitutional - NAD, Comfortable  HEENT - NCAT, EOMI  Neck - +Trach  Chest - Coarse breathe sounds diffusely  Cardiovascular - S1S2  Abdomen - +PEG, BS+, Soft, NTND  Extremities - Left BKA  Neurologic Exam -                    Cognitive - Lethargic but intermittently arousal to vocal and tactile stimuli, Intermittently follows commands (raising hands and legs, grasping)     Communication - Mouths words but unable to produce sound likely secondary to trach     Motor - Difficult to assess but no focal deficits noted, moving all extremities freely against gravity     Reflexes - DTR intact and symmetrical, Negative Mcclelland's bilaterally, Negative Babinski's RLE  Skin - Healed left BKA    RECENT LABS/IMAGING             9.6    6.7   )-----------( 254      ( 02 Dec 2017 08:55 )             30.3     139  |  102  |  31.0<H>  ----------------------------<  100     ( 02 Dec 2017 )  4.2   |  27.0  |  0.61    Ca    8.9      02 Dec 2017 08:53  Phos  4.0     12-02  Mg     1.9     12-02    Tri - 79     MEDICATIONS   MEDICATIONS  (STANDING):  ALBUTerol/ipratropium for Nebulization 3 milliLiter(s) Nebulizer every 6 hours  chlorhexidine 0.12% Liquid 15 milliLiter(s) Swish and Spit two times a day  digoxin     Tablet 0.125 milliGRAM(s) Oral daily  diltiazem    Tablet 60 milliGRAM(s) Oral every 6 hours  enoxaparin Injectable 40 milliGRAM(s) SubCutaneous every 24 hours  folic acid 1 milliGRAM(s) Oral daily  lactulose Syrup 10 Gram(s) Oral daily  lisinopril 5 milliGRAM(s) Oral daily  metoprolol     tartrate 25 milliGRAM(s) Oral every 8 hours  multivitamin 1 Tablet(s) Oral daily  OLANZapine 2.5 milliGRAM(s) Oral daily  OLANZapine 5 milliGRAM(s) Oral at bedtime  pantoprazole   Suspension 40 milliGRAM(s) Oral daily  tamsulosin 0.4 milliGRAM(s) Oral at bedtime    MEDICATIONS  (PRN):  acetaminophen    Suspension 650 milliGRAM(s) Oral every 6 hours PRN For Temp greater than 38 C (100.4 F)  bisacodyl Suppository 10 milliGRAM(s) Rectal daily PRN Constipation  haloperidol    Injectable 2 milliGRAM(s) IV Push every 6 hours PRN agitation  metoprolol    tartrate Injectable 5 milliGRAM(s) IV Push every 6 hours PRN HR > 120    ASSESSMENT/PLAN  61 y/o male with encephalopathy which may be related to alcohol withdrawal. Course complicated with respiratory failure and pneumonia requiring intubation and eventual tracheostomy. He is with endurance, breathing, swallowing, functional, gait, ADL impairments.    Disposition -  PT - ROM, Bed mobility, Transfers, Ambulation with assistive device  OT - ADLs, ROM  SLP - Dysphagia eval and treat  Precautions - Falls, Cardiac  Safety - 1:1 supervision  Mood - Zyprexa BID  DVT Prophylaxis - Lovenox, SCD's  Weight bearing status - Full weight bearing  Skin - Turn Q2hrs  Diet - NPO on tube feeds HPI:  Mr. Boone is a 62 year old male with a PMHx of atrial fibrillation (not on AC due to falls), CHF, COPD and EtOH abuse who was brought in by EMS with complaints of shortness of breath. Patient was drinking alcohol earlier that day when he started having difficulty breathing prompting him to call EMS. Started on steroids, Librium protocol. He initially signed out AMA but returned to the hospital agitated and intoxicated. Rapid response was called for alcohol withdrawal, requiring transfer to the ICU. He was placed on Valium and Precedex and intubated for airway protection. Started on antibiotics for aspiration pneumonia. Self extubated 11/2, but re-intubated 11/4. TTE showed cardiomyopathy. EEG negative for seizures, altered mental status believed to be related to encephalopathy and alcohol withdrawal. Self extubated once again 11/7, requiring reintubation. MRI brain unremarkable. s/p tracheostomy on 11/10. Antibiotics adjusted to Aztreonam for Pseudomonas pneumonia. Had episodes of a-fib with RVR requiring medication adjustments and addition of Digoxin. Code sepsis on 11/13. s/p PEG placement on 11/22. Had episodes of hypotension for which medications were once again adjusted.     Today, he is lethargic, able to open eyes intermittently to vocal stimuli. Follows simple commands intermittently such as raising hands and grasping. Remains on 1:1. Per nursing on CPAP overnight but tolerated trach collar during day well yesterday without desaturations.     REVIEW OF SYSTEMS  Unable to obtain secondary to level of arousal and trach    PAST MEDICAL & SURGICAL HISTORY (from documentation)  Hypertension  Cirrhosis  Chronic obstructive pulmonary disease  Chronic diastolic congestive heart failure  Atrial fibrillation  s/p left BKA     SOCIAL HISTORY (from documentation)  Smoking - 2ppd  EtOH - Denied   Drugs - Denied    FUNCTIONAL HISTORY (from documentation)  Lives alone in a home with 1 LETY + HR and no stairs inside  Prior to hospitalization utilized left prosthesis and SAC for assistance with ambulation.     CURRENT FUNCTIONAL STATUS  Bed mobility - Min A x 2 using bedrails  Transfers - Max A x 2 using RW    FAMILY HISTORY   Reviewed and non-contributory    ALLERGIES  Ceclor     VITALS  T(C): 36.9 (12-02-17 @ 07:14)  T(F): 98.4 (12-02-17 @ 07:14), Max: 98.7 (12-01-17 @ 16:06)  HR: 74 (12-02-17 @ 07:45) (65 - 101)  BP: 100/60 (12-02-17 @ 09:42) (77/46 - 114/57)  RR:  (18 - 36)  SpO2:  (100% - 100%)  Wt(kg): --    PHYSICAL EXAM  Constitutional - NAD, Comfortable  HEENT - NCAT, EOMI  Neck - +Trach  Chest - Coarse breathe sounds diffusely  Cardiovascular - S1S2  Abdomen - +PEG, BS+, Soft, NTND  Extremities - Left BKA  Neurologic Exam -                    Cognitive - Lethargic but intermittently arousal to vocal and tactile stimuli, Intermittently follows commands (raising hands and legs, grasping)     Communication - Mouths words but unable to produce sound likely secondary to trach     Motor - Difficult to assess but no focal deficits noted, moving all extremities freely against gravity     Reflexes - DTR intact and symmetrical, Negative Mcclelland's bilaterally, Negative Babinski's RLE  Skin - Healed left BKA    RECENT LABS/IMAGING             9.6    6.7   )-----------( 254      ( 02 Dec 2017 08:55 )             30.3     139  |  102  |  31.0<H>  ----------------------------<  100     ( 02 Dec 2017 )  4.2   |  27.0  |  0.61    Ca    8.9      02 Dec 2017 08:53  Phos  4.0     12-02  Mg     1.9     12-02    Tri - 79     MEDICATIONS   MEDICATIONS  (STANDING):  ALBUTerol/ipratropium for Nebulization 3 milliLiter(s) Nebulizer every 6 hours  chlorhexidine 0.12% Liquid 15 milliLiter(s) Swish and Spit two times a day  digoxin     Tablet 0.125 milliGRAM(s) Oral daily  diltiazem    Tablet 60 milliGRAM(s) Oral every 6 hours  enoxaparin Injectable 40 milliGRAM(s) SubCutaneous every 24 hours  folic acid 1 milliGRAM(s) Oral daily  lactulose Syrup 10 Gram(s) Oral daily  lisinopril 5 milliGRAM(s) Oral daily  metoprolol     tartrate 25 milliGRAM(s) Oral every 8 hours  multivitamin 1 Tablet(s) Oral daily  OLANZapine 2.5 milliGRAM(s) Oral daily  OLANZapine 5 milliGRAM(s) Oral at bedtime  pantoprazole   Suspension 40 milliGRAM(s) Oral daily  tamsulosin 0.4 milliGRAM(s) Oral at bedtime    MEDICATIONS  (PRN):  acetaminophen    Suspension 650 milliGRAM(s) Oral every 6 hours PRN For Temp greater than 38 C (100.4 F)  bisacodyl Suppository 10 milliGRAM(s) Rectal daily PRN Constipation  haloperidol    Injectable 2 milliGRAM(s) IV Push every 6 hours PRN agitation  metoprolol    tartrate Injectable 5 milliGRAM(s) IV Push every 6 hours PRN HR > 120    ASSESSMENT/PLAN  61 y/o male with encephalopathy which may be related to alcohol withdrawal. Course complicated with respiratory failure and pneumonia requiring intubation and eventual tracheostomy. He is with endurance, breathing, swallowing, functional, gait, ADL impairments.    Disposition - Will speak to therapy to determine what patient has been able to tolerate at this time. Unsure if patient has previous prosthesis for left BKA. Will continue to follow patient to make final rehabilitation recommendations.  PT - ROM, Bed mobility, OOB to chair  OT - ADLs, ROM  SLP - Dysphagia eval and treat  Precautions - Falls, Cardiac  Safety - 1:1 supervision  Mood - Zyprexa BID  DVT Prophylaxis - Lovenox, SCD's  Weight bearing status - Full weight bearing  Skin - Turn Q2hrs  Diet - NPO on tube feeds

## 2017-12-02 NOTE — PROGRESS NOTE ADULT - SUBJECTIVE AND OBJECTIVE BOX
STONE DIAZ     Chief Complaint: Patient is a 62y old  Male who presents with a chief complaint of Shortness of breath (30 Oct 2017 15:03)      PAST MEDICAL & SURGICAL HISTORY:  Falls  Meningitis  Collapsed lung  Alcohol withdrawal  Emphysema of lung  ETOH abuse  Cirrhosis  Afib  CHF (congestive heart failure)  Poor historian  Alcohol abuse  Chronic atrial fibrillation  S/P BKA (below knee amputation), left: secondary to worsening infection in lower leg. Had both ankle injuries from fall s/p repair - left had complication.  S/P BKA (below knee amputation) unilateral, left      HPI/OVERNIGHT EVENTS: patient confused, pulling at his teacheostomy. I spoke to the daughter regarding his care.    MEDICATIONS  (STANDING):  ALBUTerol/ipratropium for Nebulization 3 milliLiter(s) Nebulizer every 6 hours  chlorhexidine 0.12% Liquid 15 milliLiter(s) Swish and Spit two times a day  digoxin     Tablet 0.125 milliGRAM(s) Oral daily  diltiazem    Tablet 60 milliGRAM(s) Oral every 6 hours  enoxaparin Injectable 40 milliGRAM(s) SubCutaneous every 24 hours  folic acid 1 milliGRAM(s) Oral daily  influenza   Vaccine 0.5 milliLiter(s) IntraMuscular once  lactulose Syrup 10 Gram(s) Oral daily  lisinopril 5 milliGRAM(s) Oral daily  metoprolol     tartrate 25 milliGRAM(s) Oral every 8 hours  multivitamin 1 Tablet(s) Oral daily  OLANZapine 2.5 milliGRAM(s) Oral daily  OLANZapine 5 milliGRAM(s) Oral at bedtime  pantoprazole   Suspension 40 milliGRAM(s) Oral daily  tamsulosin 0.4 milliGRAM(s) Oral at bedtime      Vital Signs Last 24 Hrs  T(C): 36.9 (02 Dec 2017 07:14), Max: 37.1 (01 Dec 2017 16:06)  T(F): 98.4 (02 Dec 2017 07:14), Max: 98.7 (01 Dec 2017 16:06)  HR: 74 (02 Dec 2017 07:45) (65 - 101)  BP: 100/60 (02 Dec 2017 09:42) (77/46 - 114/57)  BP(mean): --  RR: 22 (02 Dec 2017 07:45) (18 - 36)  SpO2: 96% (02 Dec 2017 15:37) (96% - 100%)    PHYSICAL EXAM:  Constitutional: NAD, well-groomed, well-developed  HEENT:  tracheostomy  Neck: No LAD, No JVD  Back: Normal spine flexure, No CVA tenderness  Respiratory: CTAB Cardiovascular: S1 and S2, RRR, no M/G/R  Gastrointestinal: BS+, soft, NT/ND  Extremities: No peripheral edema  Vascular: 2+ peripheral pulses  Neurological: confused    CAPILLARY BLOOD GLUCOSE    LABS:                        9.6    6.7   )-----------( 254      ( 02 Dec 2017 08:55 )             30.3     12-02    139  |  102  |  31.0<H>  ----------------------------<  100  4.2   |  27.0  |  0.61    Ca    8.9      02 Dec 2017 08:53  Phos  4.0     12-02  Mg     1.9     12-02            RADIOLOGY & ADDITIONAL TESTS:

## 2017-12-02 NOTE — PROGRESS NOTE ADULT - ASSESSMENT
62 yr old male with atrial fibrillation, cirrhosis, CHF, alcohol abuse admitted with shortness of breath, admitted for COPD exacerbation, acute diastolic CHF and alcohol abuse, started on steroids, Librium protocol. Cardiology consulted. ECHO and stress test was ordered. He signed out AMA, returned to the hospital agitated, intoxicated. RRT was called later as he was in alcohol withdrawal, ICU was consulted, placed on Valium and Precedex, intubated for airway protection. He was given antibiotics for aspiration, he self extubated on 11/2, but was re intubated on 11/4 for airway protection and altered mental status. ECHO revealed cardiomyopathy. Mental status attributed to encephalopathy and alcohol withdrawal. EEG negative for seizures. He self extubated on 11/7, requiring reintubation. MRI brain unremarkable. He did not tolerate SBT given mental status and copious secretions. CT surgery was consulted for tracheostomy, which was performed on 11/10. Antibiotics changed to Aztreonam for pseudomonas pneumonia. He was transferred out of ICU, rate control medications adjusted for a fib with RVR.  He developed code sepsis on 11/13, he was readmitted to ICU for further management. GI consulted for PEG placement. Cardizem and Lopressor given for A fib with RVR. Medications started on encephalopathy. PEG placed on 11/22. He had episodes of fever, monitored off antibiotics. Digoxin was added for rate control. He was transferred out of medical ICU on 11/29. He developed hypotension on 11/30 after rate control medications were given. BP improved post fluids, heart rate improved.   12/2/2017 Patient continues to suffer from confusion requiring constatnt observation.

## 2017-12-02 NOTE — CONSULT NOTE ADULT - ATTENDING COMMENTS
History from chart.   62 year old male seen for rehab consultation after hospitalization for AMS - due to ETOH withdrawal,   Patient with encephalopathy, now has a Trach, PEG, On 1:1 supervision.   Patient with limited cooperation on bedside exam.   Will discuss with therapy to coordinate program.   will follow.  Thank you

## 2017-12-03 LAB
HCT VFR BLD CALC: 29.8 % — LOW (ref 42–52)
HGB BLD-MCNC: 9.8 G/DL — LOW (ref 14–18)
MCHC RBC-ENTMCNC: 29.8 PG — SIGNIFICANT CHANGE UP (ref 27–31)
MCHC RBC-ENTMCNC: 32.9 G/DL — SIGNIFICANT CHANGE UP (ref 32–36)
MCV RBC AUTO: 90.6 FL — SIGNIFICANT CHANGE UP (ref 80–94)
PLATELET # BLD AUTO: 236 K/UL — SIGNIFICANT CHANGE UP (ref 150–400)
RBC # BLD: 3.29 M/UL — LOW (ref 4.6–6.2)
RBC # FLD: 15 % — SIGNIFICANT CHANGE UP (ref 11–15.6)
WBC # BLD: 7.8 K/UL — SIGNIFICANT CHANGE UP (ref 4.8–10.8)
WBC # FLD AUTO: 7.8 K/UL — SIGNIFICANT CHANGE UP (ref 4.8–10.8)

## 2017-12-03 PROCEDURE — 99233 SBSQ HOSP IP/OBS HIGH 50: CPT

## 2017-12-03 RX ORDER — SODIUM CHLORIDE 9 MG/ML
500 INJECTION INTRAMUSCULAR; INTRAVENOUS; SUBCUTANEOUS ONCE
Qty: 0 | Refills: 0 | Status: COMPLETED | OUTPATIENT
Start: 2017-12-03 | End: 2017-12-03

## 2017-12-03 RX ADMIN — Medication 1 MILLIGRAM(S): at 05:21

## 2017-12-03 RX ADMIN — ENOXAPARIN SODIUM 40 MILLIGRAM(S): 100 INJECTION SUBCUTANEOUS at 11:43

## 2017-12-03 RX ADMIN — PANTOPRAZOLE SODIUM 40 MILLIGRAM(S): 20 TABLET, DELAYED RELEASE ORAL at 11:43

## 2017-12-03 RX ADMIN — Medication 3 MILLILITER(S): at 08:37

## 2017-12-03 RX ADMIN — Medication 25 MILLIGRAM(S): at 13:45

## 2017-12-03 RX ADMIN — Medication 1 MILLIGRAM(S): at 11:43

## 2017-12-03 RX ADMIN — LACTULOSE 10 GRAM(S): 10 SOLUTION ORAL at 11:43

## 2017-12-03 RX ADMIN — TAMSULOSIN HYDROCHLORIDE 0.4 MILLIGRAM(S): 0.4 CAPSULE ORAL at 22:03

## 2017-12-03 RX ADMIN — OLANZAPINE 5 MILLIGRAM(S): 15 TABLET, FILM COATED ORAL at 22:03

## 2017-12-03 RX ADMIN — SODIUM CHLORIDE 500 MILLILITER(S): 9 INJECTION INTRAMUSCULAR; INTRAVENOUS; SUBCUTANEOUS at 00:42

## 2017-12-03 RX ADMIN — Medication 3 MILLILITER(S): at 15:25

## 2017-12-03 RX ADMIN — CHLORHEXIDINE GLUCONATE 15 MILLILITER(S): 213 SOLUTION TOPICAL at 05:21

## 2017-12-03 RX ADMIN — Medication 3 MILLILITER(S): at 03:44

## 2017-12-03 RX ADMIN — Medication 0.12 MILLIGRAM(S): at 05:21

## 2017-12-03 RX ADMIN — Medication 3 MILLILITER(S): at 21:41

## 2017-12-03 RX ADMIN — Medication 1 TABLET(S): at 11:43

## 2017-12-03 RX ADMIN — CHLORHEXIDINE GLUCONATE 15 MILLILITER(S): 213 SOLUTION TOPICAL at 17:18

## 2017-12-03 RX ADMIN — OLANZAPINE 2.5 MILLIGRAM(S): 15 TABLET, FILM COATED ORAL at 11:43

## 2017-12-03 NOTE — PROGRESS NOTE ADULT - ASSESSMENT
62 yr old male with atrial fibrillation, cirrhosis, CHF, alcohol abuse admitted with shortness of breath, admitted for COPD exacerbation, acute diastolic CHF and alcohol abuse, started on steroids, Librium protocol. Cardiology consulted. ECHO and stress test was ordered. He signed out AMA, returned to the hospital agitated, intoxicated. RRT was called later as he was in alcohol withdrawal, ICU was consulted, placed on Valium and Precedex, intubated for airway protection. He was given antibiotics for aspiration, he self extubated on 11/2, but was re intubated on 11/4 for airway protection and altered mental status. ECHO revealed cardiomyopathy. Mental status attributed to encephalopathy and alcohol withdrawal. EEG negative for seizures. He self extubated on 11/7, requiring reintubation. MRI brain unremarkable. He did not tolerate SBT given mental status and copious secretions. CT surgery was consulted for tracheostomy, which was performed on 11/10. Antibiotics changed to Aztreonam for pseudomonas pneumonia. He was transferred out of ICU, rate control medications adjusted for a fib with RVR.  He developed code sepsis on 11/13, he was readmitted to ICU for further management. GI consulted for PEG placement. Cardizem and Lopressor given for A fib with RVR. Medications started on encephalopathy. PEG placed on 11/22. He had episodes of fever, monitored off antibiotics. Digoxin was added for rate control. He was transferred out of medical ICU on 11/29. He developed hypotension on 11/30 after rate control medications were given. BP improved post fluids, heart rate improved.   12/2/2017 Patient continues to suffer from confusion requiring constatnt observation. On 12/3 He is more energetic, sitting up but continues to be confused, not following simple commands.

## 2017-12-03 NOTE — PROGRESS NOTE ADULT - SUBJECTIVE AND OBJECTIVE BOX
CLATUS CARMARCELINAKOSTA     Chief Complaint: Patient is a 62y old  Male who presents with a chief complaint of Shortness of breath (30 Oct 2017 15:03)      PAST MEDICAL & SURGICAL HISTORY:  Falls  Meningitis  Collapsed lung  Alcohol withdrawal  Emphysema of lung  ETOH abuse  Cirrhosis  Afib  CHF (congestive heart failure)  Poor historian  Alcohol abuse  Chronic atrial fibrillation  S/P BKA (below knee amputation), left: secondary to worsening infection in lower leg. Had both ankle injuries from fall s/p repair - left had complication.  S/P BKA (below knee amputation) unilateral, left      HPI/OVERNIGHT EVENTS: Patient sitting up. He does not follow simple commands.    MEDICATIONS  (STANDING):  ALBUTerol/ipratropium for Nebulization 3 milliLiter(s) Nebulizer every 6 hours  chlorhexidine 0.12% Liquid 15 milliLiter(s) Swish and Spit two times a day  digoxin     Tablet 0.125 milliGRAM(s) Oral daily  diltiazem    Tablet 60 milliGRAM(s) Oral every 6 hours  enoxaparin Injectable 40 milliGRAM(s) SubCutaneous every 24 hours  folic acid 1 milliGRAM(s) Oral daily  influenza   Vaccine 0.5 milliLiter(s) IntraMuscular once  lactulose Syrup 10 Gram(s) Oral daily  lisinopril 5 milliGRAM(s) Oral daily  metoprolol     tartrate 25 milliGRAM(s) Oral every 8 hours  multivitamin 1 Tablet(s) Oral daily  OLANZapine 2.5 milliGRAM(s) Oral daily  OLANZapine 5 milliGRAM(s) Oral at bedtime  pantoprazole   Suspension 40 milliGRAM(s) Oral daily  tamsulosin 0.4 milliGRAM(s) Oral at bedtime      Vital Signs Last 24 Hrs  T(C): 36.7 (03 Dec 2017 07:00), Max: 37.1 (02 Dec 2017 16:00)  T(F): 98.1 (03 Dec 2017 07:00), Max: 98.7 (02 Dec 2017 16:00)  HR: 62 (03 Dec 2017 13:46) (62 - 99)  BP: 110/66 (03 Dec 2017 13:46) (78/50 - 128/70)  BP(mean): --  RR: 18 (03 Dec 2017 07:00) (16 - 18)  SpO2: 98% (03 Dec 2017 09:34) (96% - 100%)    PHYSICAL EXAM:  Constitutional:  confused  HEENT:  tracheostomy  Neck: No LAD, No JVD  Back: Normal spine flexure, No CVA tenderness  Respiratory: CTAB Cardiovascular: S1 and S2, RRR, no M/G/R  Gastrointestinal: BS+, soft, NT/ND  Extremities: No peripheral edema  Vascular: 2+ peripheral pulses  Neurological:  confused    CAPILLARY BLOOD GLUCOSE    LABS:                        9.8    7.8   )-----------( 236      ( 03 Dec 2017 09:56 )             29.8     12-02    139  |  102  |  31.0<H>  ----------------------------<  100  4.2   |  27.0  |  0.61    Ca    8.9      02 Dec 2017 08:53  Phos  4.0     12-02  Mg     1.9     12-02            RADIOLOGY & ADDITIONAL TESTS:

## 2017-12-04 LAB
ANION GAP SERPL CALC-SCNC: 10 MMOL/L — SIGNIFICANT CHANGE UP (ref 5–17)
BUN SERPL-MCNC: 25 MG/DL — HIGH (ref 8–20)
CALCIUM SERPL-MCNC: 8.6 MG/DL — SIGNIFICANT CHANGE UP (ref 8.6–10.2)
CHLORIDE SERPL-SCNC: 101 MMOL/L — SIGNIFICANT CHANGE UP (ref 98–107)
CO2 SERPL-SCNC: 25 MMOL/L — SIGNIFICANT CHANGE UP (ref 22–29)
CREAT SERPL-MCNC: 0.61 MG/DL — SIGNIFICANT CHANGE UP (ref 0.5–1.3)
GLUCOSE SERPL-MCNC: 119 MG/DL — HIGH (ref 70–115)
HCT VFR BLD CALC: 30.2 % — LOW (ref 42–52)
HGB BLD-MCNC: 9.9 G/DL — LOW (ref 14–18)
MAGNESIUM SERPL-MCNC: 1.8 MG/DL — SIGNIFICANT CHANGE UP (ref 1.6–2.6)
MCHC RBC-ENTMCNC: 29.6 PG — SIGNIFICANT CHANGE UP (ref 27–31)
MCHC RBC-ENTMCNC: 32.8 G/DL — SIGNIFICANT CHANGE UP (ref 32–36)
MCV RBC AUTO: 90.1 FL — SIGNIFICANT CHANGE UP (ref 80–94)
PHOSPHATE SERPL-MCNC: 3.4 MG/DL — SIGNIFICANT CHANGE UP (ref 2.4–4.7)
PLATELET # BLD AUTO: 228 K/UL — SIGNIFICANT CHANGE UP (ref 150–400)
POTASSIUM SERPL-MCNC: 4.1 MMOL/L — SIGNIFICANT CHANGE UP (ref 3.5–5.3)
POTASSIUM SERPL-SCNC: 4.1 MMOL/L — SIGNIFICANT CHANGE UP (ref 3.5–5.3)
RBC # BLD: 3.35 M/UL — LOW (ref 4.6–6.2)
RBC # FLD: 15.3 % — SIGNIFICANT CHANGE UP (ref 11–15.6)
SODIUM SERPL-SCNC: 136 MMOL/L — SIGNIFICANT CHANGE UP (ref 135–145)
TSH SERPL-MCNC: 0.85 UIU/ML — SIGNIFICANT CHANGE UP (ref 0.27–4.2)
WBC # BLD: 7 K/UL — SIGNIFICANT CHANGE UP (ref 4.8–10.8)
WBC # FLD AUTO: 7 K/UL — SIGNIFICANT CHANGE UP (ref 4.8–10.8)

## 2017-12-04 PROCEDURE — 99222 1ST HOSP IP/OBS MODERATE 55: CPT

## 2017-12-04 PROCEDURE — 99233 SBSQ HOSP IP/OBS HIGH 50: CPT

## 2017-12-04 RX ORDER — THIAMINE MONONITRATE (VIT B1) 100 MG
100 TABLET ORAL DAILY
Qty: 0 | Refills: 0 | Status: DISCONTINUED | OUTPATIENT
Start: 2017-12-04 | End: 2017-12-14

## 2017-12-04 RX ORDER — GABAPENTIN 400 MG/1
100 CAPSULE ORAL EVERY 8 HOURS
Qty: 0 | Refills: 0 | Status: DISCONTINUED | OUTPATIENT
Start: 2017-12-04 | End: 2017-12-05

## 2017-12-04 RX ADMIN — Medication 1 MILLIGRAM(S): at 11:27

## 2017-12-04 RX ADMIN — Medication 1 TABLET(S): at 11:27

## 2017-12-04 RX ADMIN — CHLORHEXIDINE GLUCONATE 15 MILLILITER(S): 213 SOLUTION TOPICAL at 17:48

## 2017-12-04 RX ADMIN — PANTOPRAZOLE SODIUM 40 MILLIGRAM(S): 20 TABLET, DELAYED RELEASE ORAL at 11:27

## 2017-12-04 RX ADMIN — Medication 3 MILLILITER(S): at 14:40

## 2017-12-04 RX ADMIN — Medication 100 MILLIGRAM(S): at 17:26

## 2017-12-04 RX ADMIN — Medication 3 MILLILITER(S): at 09:10

## 2017-12-04 RX ADMIN — OLANZAPINE 2.5 MILLIGRAM(S): 15 TABLET, FILM COATED ORAL at 11:28

## 2017-12-04 RX ADMIN — Medication 0.12 MILLIGRAM(S): at 06:25

## 2017-12-04 RX ADMIN — CHLORHEXIDINE GLUCONATE 15 MILLILITER(S): 213 SOLUTION TOPICAL at 06:25

## 2017-12-04 RX ADMIN — Medication 3 MILLILITER(S): at 03:57

## 2017-12-04 RX ADMIN — Medication 25 MILLIGRAM(S): at 23:01

## 2017-12-04 RX ADMIN — ENOXAPARIN SODIUM 40 MILLIGRAM(S): 100 INJECTION SUBCUTANEOUS at 11:28

## 2017-12-04 RX ADMIN — OLANZAPINE 5 MILLIGRAM(S): 15 TABLET, FILM COATED ORAL at 23:01

## 2017-12-04 RX ADMIN — GABAPENTIN 100 MILLIGRAM(S): 400 CAPSULE ORAL at 21:38

## 2017-12-04 RX ADMIN — Medication 25 MILLIGRAM(S): at 14:05

## 2017-12-04 RX ADMIN — TAMSULOSIN HYDROCHLORIDE 0.4 MILLIGRAM(S): 0.4 CAPSULE ORAL at 23:01

## 2017-12-04 RX ADMIN — LACTULOSE 10 GRAM(S): 10 SOLUTION ORAL at 11:27

## 2017-12-04 NOTE — BEHAVIORAL HEALTH ASSESSMENT NOTE - NSBHCHARTREVIEWINVESTIGATE_PSY_A_CORE FT
< from: 12 Lead ECG (11.13.17 @ 15:53) >    Atrial fibrillation with rapid ventricular response  Nonspecific ST abnormality  Abnormal ECG    < end of copied text >

## 2017-12-04 NOTE — PROGRESS NOTE ADULT - ASSESSMENT
-Chronic vent failure / tracheostomy; Tolerating TC  -S/p prolonged hospital course  -CHf with normal EF  -AF - presume not on AC secondary to cirrhosis/bleeding risk given mild coagulopathy - would leave to discretion of cardio/GI  -ETOH / cirrhosis / altered mental status- multifactorial but likely chronic ETOH related ( MRI neg CVA)  -COPD  -Mediastinal and L hilar adenopathy    RECC:  continue nebs. Wean as able; will extend T/C to nighttime as well. Vent as needed. Optimize cardiac function. Nutritional support. PT/mobilze. Follow chest CT for KWADWO in 2 months- consider bx if persists. Trach care. GI/DVT prophylaxis

## 2017-12-04 NOTE — PROGRESS NOTE ADULT - SUBJECTIVE AND OBJECTIVE BOX
PULMONARY PROGRESS NOTE      [id number]    Patient is a 62y old  Male who presents with a chief complaint of Shortness of breath (30 Oct 2017 15:03)      INTERVAL HPI/OVERNIGHT EVENTS: Episodes of agitation. Currently on T/C. Vent QHS; CPAP. No distress now.     MEDICATIONS  (STANDING):  ALBUTerol/ipratropium for Nebulization 3 milliLiter(s) Nebulizer every 6 hours  chlorhexidine 0.12% Liquid 15 milliLiter(s) Swish and Spit two times a day  digoxin     Tablet 0.125 milliGRAM(s) Oral daily  diltiazem    Tablet 60 milliGRAM(s) Oral every 6 hours  enoxaparin Injectable 40 milliGRAM(s) SubCutaneous every 24 hours  folic acid 1 milliGRAM(s) Oral daily  influenza   Vaccine 0.5 milliLiter(s) IntraMuscular once  lactulose Syrup 10 Gram(s) Oral daily  lisinopril 5 milliGRAM(s) Oral daily  metoprolol     tartrate 25 milliGRAM(s) Oral every 8 hours  multivitamin 1 Tablet(s) Oral daily  OLANZapine 2.5 milliGRAM(s) Oral daily  OLANZapine 5 milliGRAM(s) Oral at bedtime  pantoprazole   Suspension 40 milliGRAM(s) Oral daily  tamsulosin 0.4 milliGRAM(s) Oral at bedtime      MEDICATIONS  (PRN):  acetaminophen    Suspension 650 milliGRAM(s) Oral every 6 hours PRN For Temp greater than 38 C (100.4 F)  bisacodyl Suppository 10 milliGRAM(s) Rectal daily PRN Constipation  haloperidol    Injectable 2 milliGRAM(s) IntraMuscular every 6 hours PRN For agitation  metoprolol    tartrate Injectable 5 milliGRAM(s) IV Push every 6 hours PRN HR > 120      Allergies    Ceclor (Rash)    Intolerances        PAST MEDICAL & SURGICAL HISTORY:  Falls  Meningitis  Collapsed lung  Alcohol withdrawal  Emphysema of lung  ETOH abuse  Cirrhosis  Afib  CHF (congestive heart failure)  Poor historian  Alcohol abuse  Chronic atrial fibrillation  S/P BKA (below knee amputation), left: secondary to worsening infection in lower leg. Had both ankle injuries from fall s/p repair - left had complication.  S/P BKA (below knee amputation) unilateral, left             REVIEW OF SYSTEMS:    not available    Vital Signs Last 24 Hrs  T(C): 36.7 (03 Dec 2017 23:55), Max: 36.7 (03 Dec 2017 23:55)  T(F): 98.1 (03 Dec 2017 23:55), Max: 98.1 (03 Dec 2017 23:55)  HR: 84 (04 Dec 2017 03:22) (62 - 95)  BP: 90/50 (04 Dec 2017 06:00) (90/50 - 110/66)  BP(mean): --  RR: 17 (03 Dec 2017 23:55) (17 - 17)  SpO2: 98% (04 Dec 2017 04:00) (96% - 100%)    PHYSICAL EXAMINATION:    GENERAL: The patient is awake and alert in no apparent distress.     HEENT: Head is normocephalic and atraumatic. Extraocular muscles are intact. Mucous membranes are moist.    NECK: trach.    LUNGS: Clear to auscultation without wheezing, rales or rhonchi; respirations unlabored    HEART: Regular rate and rhythm      ABDOMEN: Soft, nontender, and nondistended.      EXTREMITIES: Without any cyanosis, clubbing, rash, lesions or edema.         LABS:                        9.9    7.0   )-----------( 228      ( 04 Dec 2017 08:08 )             30.2     12-04    136  |  101  |  25.0<H>  ----------------------------<  119<H>  4.1   |  25.0  |  0.61    Ca    8.6      04 Dec 2017 08:08  Phos  3.4     12-04  Mg     1.8     12-04

## 2017-12-04 NOTE — BEHAVIORAL HEALTH ASSESSMENT NOTE - NSBHCHARTREVIEWVS_PSY_A_CORE FT
Vital Signs Last 24 Hrs  T(C): 36.6 (04 Dec 2017 11:34), Max: 36.7 (03 Dec 2017 23:55)  T(F): 97.9 (04 Dec 2017 11:34), Max: 98.1 (03 Dec 2017 23:55)  HR: 78 (04 Dec 2017 14:03) (68 - 95)  BP: 114/74 (04 Dec 2017 14:03) (90/50 - 118/72)  BP(mean): --  RR: 17 (03 Dec 2017 23:55) (17 - 17)  SpO2: 100% (04 Dec 2017 11:34) (96% - 100%)

## 2017-12-04 NOTE — PROGRESS NOTE ADULT - SUBJECTIVE AND OBJECTIVE BOX
STONE DIAZ     Chief Complaint: Patient is a 62y old  Male who presents with a chief complaint of Shortness of breath (30 Oct 2017 15:03)      PAST MEDICAL & SURGICAL HISTORY:  Falls  Meningitis  Collapsed lung  Alcohol withdrawal  Emphysema of lung  ETOH abuse  Cirrhosis  Afib  CHF (congestive heart failure)  Poor historian  Alcohol abuse  Chronic atrial fibrillation  S/P BKA (below knee amputation), left: secondary to worsening infection in lower leg. Had both ankle injuries from fall s/p repair - left had complication.  S/P BKA (below knee amputation) unilateral, left      HPI/OVERNIGHT EVENTS:    MEDICATIONS  (STANDING):  ALBUTerol/ipratropium for Nebulization 3 milliLiter(s) Nebulizer every 6 hours  chlorhexidine 0.12% Liquid 15 milliLiter(s) Swish and Spit two times a day  digoxin     Tablet 0.125 milliGRAM(s) Oral daily  diltiazem    Tablet 60 milliGRAM(s) Oral every 6 hours  enoxaparin Injectable 40 milliGRAM(s) SubCutaneous every 24 hours  folic acid 1 milliGRAM(s) Oral daily  influenza   Vaccine 0.5 milliLiter(s) IntraMuscular once  lactulose Syrup 10 Gram(s) Oral daily  lisinopril 5 milliGRAM(s) Oral daily  metoprolol     tartrate 25 milliGRAM(s) Oral every 8 hours  multivitamin 1 Tablet(s) Oral daily  OLANZapine 2.5 milliGRAM(s) Oral daily  OLANZapine 5 milliGRAM(s) Oral at bedtime  pantoprazole   Suspension 40 milliGRAM(s) Oral daily  tamsulosin 0.4 milliGRAM(s) Oral at bedtime      Vital Signs Last 24 Hrs  T(C): 36.6 (04 Dec 2017 11:34), Max: 36.7 (03 Dec 2017 23:55)  T(F): 97.9 (04 Dec 2017 11:34), Max: 98.1 (03 Dec 2017 23:55)  HR: 87 (04 Dec 2017 11:34) (62 - 95)  BP: 118/72 (04 Dec 2017 11:34) (90/50 - 118/72)  BP(mean): --  RR: 17 (03 Dec 2017 23:55) (17 - 17)  SpO2: 100% (04 Dec 2017 11:34) (96% - 100%)       HEENT:  tracheostomy  Neck: No LAD, No JVD  Back: Normal spine flexure, No CVA tenderness  Respiratory: CTAB Cardiovascular: S1 and S2, RRR, no M/G/R  Gastrointestinal: BS+, soft, NT/ND  Extremities: No peripheral edema  Vascular: 2+ peripheral pulses  Neurological: less confused today     CAPILLARY BLOOD GLUCOSE    LABS:                        9.9    7.0   )-----------( 228      ( 04 Dec 2017 08:08 )             30.2     12-04    136  |  101  |  25.0<H>  ----------------------------<  119<H>  4.1   |  25.0  |  0.61    Ca    8.6      04 Dec 2017 08:08  Phos  3.4     12-04  Mg     1.8     12-04            RADIOLOGY & ADDITIONAL TESTS:

## 2017-12-04 NOTE — BEHAVIORAL HEALTH ASSESSMENT NOTE - NSBHCHARTREVIEWLAB_PSY_A_CORE FT
9.9    7.0   )-----------( 228      ( 04 Dec 2017 08:08 )             30.2     12-04    136  |  101  |  25.0<H>  ----------------------------<  119<H>  4.1   |  25.0  |  0.61    Ca    8.6      04 Dec 2017 08:08  Phos  3.4     12-04  Mg     1.8     12-04

## 2017-12-04 NOTE — BEHAVIORAL HEALTH ASSESSMENT NOTE - HPI (INCLUDE ILLNESS QUALITY, SEVERITY, DURATION, TIMING, CONTEXT, MODIFYING FACTORS, ASSOCIATED SIGNS AND SYMPTOMS)
patient's hospital course reviewed with Dr Lucas Originally presented with SOB patient with COPD subsequently went into alcohol withdrawal was intubated now with trach collar  IN ICU treated with benzodiazepines fro ETOH withdrawal delirium Delirium has not cleared despite different sedatives remains agitated now on olanzapine received lactulose for minimally elevated ammonia level  haldol also has been used  attempted to call contact numbers in chart for daughter line repeatedly busy

## 2017-12-04 NOTE — BEHAVIORAL HEALTH ASSESSMENT NOTE - NSBHCONSULTRECOMMENDOTHER_PSY_A_CORE FT
Gabapentin for agitation / anxiety in alcohol withdrawal delirium with complication of cirrhosis and respiratory failure hopefully effect will calm patient and not exacerbate other medical conditions

## 2017-12-04 NOTE — PROGRESS NOTE ADULT - PROBLEM SELECTOR PLAN 3
Maintain CO 1:1, on Olanzapine. Likely secondary to alcohol abuse.  12/4 Patient continues to climb out of bed.

## 2017-12-04 NOTE — PROGRESS NOTE ADULT - ASSESSMENT
62 yr old male with atrial fibrillation, cirrhosis, CHF, alcohol abuse admitted with shortness of breath, admitted for COPD exacerbation, acute diastolic CHF and alcohol abuse, started on steroids, Librium protocol. Cardiology consulted. ECHO and stress test was ordered. He signed out AMA, returned to the hospital agitated, intoxicated. RRT was called later as he was in alcohol withdrawal, ICU was consulted, placed on Valium and Precedex, intubated for airway protection. He was given antibiotics for aspiration, he self extubated on 11/2, but was re intubated on 11/4 for airway protection and altered mental status. ECHO revealed cardiomyopathy. Mental status attributed to encephalopathy and alcohol withdrawal. EEG negative for seizures. He self extubated on 11/7, requiring reintubation. MRI brain unremarkable. He did not tolerate SBT given mental status and copious secretions. CT surgery was consulted for tracheostomy, which was performed on 11/10. Antibiotics changed to Aztreonam for pseudomonas pneumonia. He was transferred out of ICU, rate control medications adjusted for a fib with RVR.  He developed code sepsis on 11/13, he was readmitted to ICU for further management. GI consulted for PEG placement. Cardizem and Lopressor given for A fib with RVR. Medications started on encephalopathy. PEG placed on 11/22. He had episodes of fever, monitored off antibiotics. Digoxin was added for rate control. He was transferred out of medical ICU on 11/29. He developed hypotension on 11/30 after rate control medications were given. BP improved post fluids, heart rate improved.   12/2/2017 Patient continues to suffer from confusion requiring constatnt observation. On 12/3 He is more energetic, sitting up but continues to be confused, not following simple commands. 12/4 Patient is more alert today. He followed simple commands.

## 2017-12-04 NOTE — BEHAVIORAL HEALTH ASSESSMENT NOTE - SUMMARY
62 year old male with COPD A fib now with trach collar was treated with benzodiazepines for alcohol withdrawal delirium remains with hyperactive delirium confusion inability to follow multistage commands  psychiatric history is not reported

## 2017-12-04 NOTE — BEHAVIORAL HEALTH ASSESSMENT NOTE - NSBHCHARTREVIEWIMAGING_PSY_A_CORE FT
< from: MRI Brain w/Diffusion (11.07.17 @ 16:51) >    There is no abnormal restricted diffusion to suggest acute infarction.   Scattered periventricular and subcortical white matter T2 /FLAIR   hyper intensities are seen without mass effect, nonspecific, likely   representing mild chronic microvascular changes.    < end of copied text >

## 2017-12-05 DIAGNOSIS — F05 DELIRIUM DUE TO KNOWN PHYSIOLOGICAL CONDITION: ICD-10-CM

## 2017-12-05 PROCEDURE — 99233 SBSQ HOSP IP/OBS HIGH 50: CPT

## 2017-12-05 PROCEDURE — 99232 SBSQ HOSP IP/OBS MODERATE 35: CPT

## 2017-12-05 RX ORDER — GABAPENTIN 400 MG/1
200 CAPSULE ORAL EVERY 8 HOURS
Qty: 0 | Refills: 0 | Status: DISCONTINUED | OUTPATIENT
Start: 2017-12-05 | End: 2017-12-06

## 2017-12-05 RX ORDER — ALPRAZOLAM 0.25 MG
0.25 TABLET ORAL EVERY 6 HOURS
Qty: 0 | Refills: 0 | Status: DISCONTINUED | OUTPATIENT
Start: 2017-12-05 | End: 2017-12-06

## 2017-12-05 RX ORDER — GABAPENTIN 400 MG/1
200 CAPSULE ORAL EVERY 8 HOURS
Qty: 0 | Refills: 0 | Status: DISCONTINUED | OUTPATIENT
Start: 2017-12-05 | End: 2017-12-05

## 2017-12-05 RX ADMIN — Medication 0.12 MILLIGRAM(S): at 05:13

## 2017-12-05 RX ADMIN — PANTOPRAZOLE SODIUM 40 MILLIGRAM(S): 20 TABLET, DELAYED RELEASE ORAL at 11:28

## 2017-12-05 RX ADMIN — Medication 3 MILLILITER(S): at 22:02

## 2017-12-05 RX ADMIN — OLANZAPINE 5 MILLIGRAM(S): 15 TABLET, FILM COATED ORAL at 23:37

## 2017-12-05 RX ADMIN — Medication 100 MILLIGRAM(S): at 11:28

## 2017-12-05 RX ADMIN — GABAPENTIN 100 MILLIGRAM(S): 400 CAPSULE ORAL at 05:14

## 2017-12-05 RX ADMIN — Medication 3 MILLILITER(S): at 03:34

## 2017-12-05 RX ADMIN — LACTULOSE 10 GRAM(S): 10 SOLUTION ORAL at 11:27

## 2017-12-05 RX ADMIN — Medication 3 MILLILITER(S): at 08:38

## 2017-12-05 RX ADMIN — TAMSULOSIN HYDROCHLORIDE 0.4 MILLIGRAM(S): 0.4 CAPSULE ORAL at 23:37

## 2017-12-05 RX ADMIN — Medication 25 MILLIGRAM(S): at 23:38

## 2017-12-05 RX ADMIN — GABAPENTIN 100 MILLIGRAM(S): 400 CAPSULE ORAL at 13:18

## 2017-12-05 RX ADMIN — CHLORHEXIDINE GLUCONATE 15 MILLILITER(S): 213 SOLUTION TOPICAL at 05:13

## 2017-12-05 RX ADMIN — LISINOPRIL 5 MILLIGRAM(S): 2.5 TABLET ORAL at 05:13

## 2017-12-05 RX ADMIN — ENOXAPARIN SODIUM 40 MILLIGRAM(S): 100 INJECTION SUBCUTANEOUS at 11:27

## 2017-12-05 RX ADMIN — OLANZAPINE 2.5 MILLIGRAM(S): 15 TABLET, FILM COATED ORAL at 11:27

## 2017-12-05 RX ADMIN — Medication 0.25 MILLIGRAM(S): at 17:06

## 2017-12-05 RX ADMIN — Medication 1 TABLET(S): at 11:27

## 2017-12-05 RX ADMIN — Medication 650 MILLIGRAM(S): at 23:38

## 2017-12-05 RX ADMIN — Medication 25 MILLIGRAM(S): at 15:46

## 2017-12-05 RX ADMIN — GABAPENTIN 200 MILLIGRAM(S): 400 CAPSULE ORAL at 23:36

## 2017-12-05 RX ADMIN — Medication 3 MILLILITER(S): at 15:40

## 2017-12-05 RX ADMIN — Medication 0.25 MILLIGRAM(S): at 23:37

## 2017-12-05 RX ADMIN — Medication 25 MILLIGRAM(S): at 05:13

## 2017-12-05 RX ADMIN — CHLORHEXIDINE GLUCONATE 15 MILLILITER(S): 213 SOLUTION TOPICAL at 16:55

## 2017-12-05 RX ADMIN — Medication 1 MILLIGRAM(S): at 11:27

## 2017-12-05 NOTE — CONSULT NOTE ADULT - PROVIDER SPECIALTY LIST ADULT
CT Surgery
Cardiology
Critical Care
MICU
Palliative Care
Pulmonology
Rehab Medicine
Rehab Medicine
Gastroenterology

## 2017-12-05 NOTE — OCCUPATIONAL THERAPY INITIAL EVALUATION ADULT - PLANNED THERAPY INTERVENTIONS, OT EVAL
bed mobility training/ROM/strengthening/cognitive, visual perceptual/transfer training/motor coordination training/ADL retraining/IADL retraining/balance training

## 2017-12-05 NOTE — CONSULT NOTE ADULT - CONSULT REQUESTED DATE/TIME
08-Nov-2017 14:27
12-Nov-2017
13-Nov-2017 10:00
15-Nov-2017 12:07
28-Oct-2017 02:50
31-Oct-2017 04:13
14-Nov-2017 13:02
05-Dec-2017 12:11
02-Dec-2017 13:34

## 2017-12-05 NOTE — CONSULT NOTE ADULT - SUBJECTIVE AND OBJECTIVE BOX
Mr. Boone is a 62 year old male with a PMHx of atrial fibrillation (not on AC due to falls), CHF, COPD and EtOH abuse who was brought in by EMS with complaints of shortness of breath. Patient was drinking alcohol earlier that day when he started having difficulty breathing prompting him to call EMS. Started on steroids, Librium protocol. He initially signed out AMA but returned to the hospital agitated and intoxicated. Rapid response was called for alcohol withdrawal, requiring transfer to the ICU. He was placed on Valium and Precedex and intubated for airway protection. Started on antibiotics for aspiration pneumonia. Self extubated 11/2, but re-intubated 11/4. TTE showed cardiomyopathy. EEG negative for seizures, altered mental status believed to be related to encephalopathy and alcohol withdrawal. Self extubated once again 11/7, requiring reintubation. MRI brain unremarkable. s/p tracheostomy on 11/10. Antibiotics adjusted to Aztreonam for Pseudomonas pneumonia. Had episodes of a-fib with RVR requiring medication adjustments and addition of Digoxin. Code sepsis on 11/13. s/p PEG placement on 11/22. Had episodes of hypotension for which medications were once again adjusted.        FUNCTIONAL PROGRESS  Today, he is lethargic, able to open eyes intermittently to vocal stimuli. Follows simple commands intermittently such as raising hands and grasping. Per nurse, intermittently impulsive. Remains under constant supervision. Tolerating trach collar without desaturations. Seen by psych, on standing olanzapine and prn haldol, gabapentin was added for agitation/anxiety in setting of etoh withdrawal.      REVIEW OF SYSTEMS  Unable to obtain 2/2 level of arousal     VITALS  T(C): 36.4 (12-05-17 @ 09:30), Max: 36.7 (12-04-17 @ 17:06)  HR: 88 (12-05-17 @ 09:30) (66 - 88)  BP: 110/74 (12-05-17 @ 09:30) (105/60 - 118/72)  RR: 19 (12-05-17 @ 09:30) (18 - 19)  SpO2: 98% (12-05-17 @ 09:30) (98% - 100%)  Wt(kg): --    MEDICATIONS   acetaminophen    Suspension 650 milliGRAM(s) every 6 hours PRN  ALBUTerol/ipratropium for Nebulization 3 milliLiter(s) every 6 hours  bisacodyl Suppository 10 milliGRAM(s) daily PRN  chlorhexidine 0.12% Liquid 15 milliLiter(s) two times a day  digoxin     Tablet 0.125 milliGRAM(s) daily  diltiazem    Tablet 60 milliGRAM(s) every 6 hours  enoxaparin Injectable 40 milliGRAM(s) every 24 hours  folic acid 1 milliGRAM(s) daily  gabapentin   Solution 100 milliGRAM(s) every 8 hours  haloperidol    Injectable 2 milliGRAM(s) every 6 hours PRN  lactulose Syrup 10 Gram(s) daily  lisinopril 5 milliGRAM(s) daily  metoprolol     tartrate 25 milliGRAM(s) every 8 hours  metoprolol    tartrate Injectable 5 milliGRAM(s) every 6 hours PRN  multivitamin 1 Tablet(s) daily  OLANZapine 2.5 milliGRAM(s) daily  OLANZapine 5 milliGRAM(s) at bedtime  pantoprazole   Suspension 40 milliGRAM(s) daily  tamsulosin 0.4 milliGRAM(s) at bedtime  thiamine 100 milliGRAM(s) daily      RECENT LABS/IMAGING  CBC Full  -  ( 04 Dec 2017 08:08 )  WBC Count : 7.0 K/uL  Hemoglobin : 9.9 g/dL  Hematocrit : 30.2 %  Platelet Count - Automated : 228 K/uL  Mean Cell Volume : 90.1 fl  Mean Cell Hemoglobin : 29.6 pg  Mean Cell Hemoglobin Concentration : 32.8 g/dL  Auto Neutrophil # : x  Auto Lymphocyte # : x  Auto Monocyte # : x  Auto Eosinophil # : x  Auto Basophil # : x  Auto Neutrophil % : x  Auto Lymphocyte % : x  Auto Monocyte % : x  Auto Eosinophil % : x  Auto Basophil % : x    12-04    136  |  101  |  25.0<H>  ----------------------------<  119<H>  4.1   |  25.0  |  0.61    Ca    8.6      04 Dec 2017 08:08  Phos  3.4     12-04  Mg     1.8     12-04          ---------  PHYSICAL EXAM  Constitutional - NAD, Comfortable  HEENT - NCAT, EOMI  Neck - +Trach  Chest - Coarse breathe sounds diffusely  Cardiovascular - S1S2  Abdomen - +PEG, BS+, Soft, NTND  Extremities - Left BKA  Neurologic Exam -                    Cognitive - Lethargic but intermittently arousal to vocal and tactile stimuli, Intermittently follows commands (raising hands and legs, grasping)     Communication - Mouths words but unable to produce sound likely secondary to trach     Motor - Difficult to assess but no focal deficits noted, moving all extremities freely against gravity     Reflexes - DTR intact and symmetrical, Negative Mcclelland's bilaterally, Negative Babinski's RLE  Skin - Healed left BKA    ASSESSMENT/PLAN  61 y/o male with encephalopathy which may be related to alcohol withdrawal. Course complicated with respiratory failure and pneumonia requiring intubation and eventual tracheostomy.  Continues to be impulsive, danger to self, on constant supervision. Tolerating trach and TFs via PEG.  He is with endurance, breathing, swallowing, functional, gait, ADL impairments.    Disposition - Will speak to therapy to determine what patient has been able to tolerate at this time. Unsure if patient has previous prosthesis for left BKA. Will continue to follow patient to make final rehabilitation recommendations.  PT - ROM, Bed mobility, OOB to chair  OT - ADLs, ROM  SLP - Dysphagia eval and treat  Precautions - Falls, Cardiac  Safety - 1:1 supervision  Mood - Zyprexa BID, PRN Haldol, Gabapentin   DVT Prophylaxis - Lovenox, SCD's  Weight bearing status - Full weight bearing  Skin - Turn Q2hrs  Diet - NPO on tube feeds Mr. Boone is a 62 year old male with a PMHx of atrial fibrillation (not on AC due to falls), CHF, COPD and EtOH abuse who was brought in by EMS with complaints of shortness of breath. Patient was drinking alcohol earlier that day when he started having difficulty breathing prompting him to call EMS. Started on steroids, Librium protocol. He initially signed out AMA but returned to the hospital agitated and intoxicated. Rapid response was called for alcohol withdrawal, requiring transfer to the ICU. He was placed on Valium and Precedex and intubated for airway protection. Started on antibiotics for aspiration pneumonia. Self extubated 11/2, but re-intubated 11/4. TTE showed cardiomyopathy. EEG negative for seizures, altered mental status believed to be related to encephalopathy and alcohol withdrawal. Self extubated once again 11/7, requiring reintubation. MRI brain unremarkable. s/p tracheostomy on 11/10. Antibiotics adjusted to Aztreonam for Pseudomonas pneumonia. Had episodes of a-fib with RVR requiring medication adjustments and addition of Digoxin. Code sepsis on 11/13. s/p PEG placement on 11/22. Had episodes of hypotension for which medications were once again adjusted.        FUNCTIONAL PROGRESS  Today, he is more alert and interactive, sitting up in the chair. Follows simple commands and making attempts to communicate although voice is hypophonic and currently refuses to write.  Daughter brought his left leg prosthetic from home. Per nurse, intermittently impulsive. Remains under constant supervision. Tolerating trach collar without desaturations. Seen by psych, on standing olanzapine and prn haldol, gabapentin was added for agitation/anxiety in setting of etoh withdrawal.      REVIEW OF SYSTEMS  Unable to assess due to patient condition     VITALS  T(C): 36.4 (12-05-17 @ 09:30), Max: 36.7 (12-04-17 @ 17:06)  HR: 88 (12-05-17 @ 09:30) (66 - 88)  BP: 110/74 (12-05-17 @ 09:30) (105/60 - 118/72)  RR: 19 (12-05-17 @ 09:30) (18 - 19)  SpO2: 98% (12-05-17 @ 09:30) (98% - 100%)  Wt(kg): --    MEDICATIONS   acetaminophen    Suspension 650 milliGRAM(s) every 6 hours PRN  ALBUTerol/ipratropium for Nebulization 3 milliLiter(s) every 6 hours  bisacodyl Suppository 10 milliGRAM(s) daily PRN  chlorhexidine 0.12% Liquid 15 milliLiter(s) two times a day  digoxin     Tablet 0.125 milliGRAM(s) daily  diltiazem    Tablet 60 milliGRAM(s) every 6 hours  enoxaparin Injectable 40 milliGRAM(s) every 24 hours  folic acid 1 milliGRAM(s) daily  gabapentin   Solution 100 milliGRAM(s) every 8 hours  haloperidol    Injectable 2 milliGRAM(s) every 6 hours PRN  lactulose Syrup 10 Gram(s) daily  lisinopril 5 milliGRAM(s) daily  metoprolol     tartrate 25 milliGRAM(s) every 8 hours  metoprolol    tartrate Injectable 5 milliGRAM(s) every 6 hours PRN  multivitamin 1 Tablet(s) daily  OLANZapine 2.5 milliGRAM(s) daily  OLANZapine 5 milliGRAM(s) at bedtime  pantoprazole   Suspension 40 milliGRAM(s) daily  tamsulosin 0.4 milliGRAM(s) at bedtime  thiamine 100 milliGRAM(s) daily      RECENT LABS/IMAGING  CBC Full  -  ( 04 Dec 2017 08:08 )  WBC Count : 7.0 K/uL  Hemoglobin : 9.9 g/dL  Hematocrit : 30.2 %  Platelet Count - Automated : 228 K/uL  Mean Cell Volume : 90.1 fl  Mean Cell Hemoglobin : 29.6 pg  Mean Cell Hemoglobin Concentration : 32.8 g/dL  Auto Neutrophil # : x  Auto Lymphocyte # : x  Auto Monocyte # : x  Auto Eosinophil # : x  Auto Basophil # : x  Auto Neutrophil % : x  Auto Lymphocyte % : x  Auto Monocyte % : x  Auto Eosinophil % : x  Auto Basophil % : x    12-04    136  |  101  |  25.0<H>  ----------------------------<  119<H>  4.1   |  25.0  |  0.61    Ca    8.6      04 Dec 2017 08:08  Phos  3.4     12-04  Mg     1.8     12-04          ---------  PHYSICAL EXAM  Constitutional - NAD, Comfortable sitting in chair   HEENT - NCAT, EOMI  Neck - +Trach  Chest - Coarse breathe sounds  Cardiovascular - S1S2  Abdomen - +PEG, BS+, Soft, NTND  Extremities - Left BKA  Neurologic Exam -                    Cognitive -Alert, responsive to vocal and tactile stimuli, making attempts to communicate, following basic commands     Communication - Mouths words but unable to produce sound likely secondary to trach     Motor - Difficult to assess but no focal deficits noted, moving all extremities freely against gravity      Reflexes - DTR intact and symmetrical, Negative Mcclelland's bilaterally, Negative Babinski's RLE  Skin - Healed left BKA    ASSESSMENT/PLAN  61 y/o male with encephalopathy which may be related to alcohol withdrawal. Course complicated with respiratory failure and pneumonia requiring intubation and eventual tracheostomy.  More alert and attentive/interactive, although continues to be impulsive (pulling at tubes/lines occasionally), danger to self, on constant supervision. Tolerating trach and TFs via PEG.  He is with endurance, breathing, swallowing, functional, gait, ADL impairments.    Disposition - Will be in communication with therapy to determine appropriate goals.  Likely a good candidate for acute rehab.  Will continue to follow patient to make final rehabilitation recommendations.  PT - ROM, Bed mobility, OOB to chair  OT - ADLs, ROM  SLP - Dysphagia/swallow eval and treat  Precautions - Falls, Cardiac  Safety - 1:1 supervision  Mood - Zyprexa BID, PRN Haldol, Gabapentin   DVT Prophylaxis - Lovenox, SCD's  Weight bearing status - Full weight bearing  Skin - Turn Q2hrs  Diet - NPO on tube feeds for now, pending formal swallow evaluation Mr. Boone is a 62 year old male with a PMHx of atrial fibrillation (not on AC due to falls), CHF, COPD and EtOH abuse who was brought in by EMS with complaints of shortness of breath. Patient was drinking alcohol earlier that day when he started having difficulty breathing prompting him to call EMS. Started on steroids, Librium protocol. He initially signed out AMA but returned to the hospital agitated and intoxicated. Rapid response was called for alcohol withdrawal, requiring transfer to the ICU. He was placed on Valium and Precedex and intubated for airway protection. Started on antibiotics for aspiration pneumonia. Self extubated 11/2, but re-intubated 11/4. TTE showed cardiomyopathy. EEG negative for seizures, altered mental status believed to be related to encephalopathy and alcohol withdrawal. Self extubated once again 11/7, requiring reintubation. MRI brain unremarkable. s/p tracheostomy on 11/10. Antibiotics adjusted to Aztreonam for Pseudomonas pneumonia. Had episodes of a-fib with RVR requiring medication adjustments and addition of Digoxin. Code sepsis on 11/13. s/p PEG placement on 11/22. Had episodes of hypotension for which medications were once again adjusted.        FUNCTIONAL PROGRESS  Today, he is more alert and interactive, sitting up in the chair. Follows simple commands and making attempts to communicate although voice is hypophonic and currently refuses to write.  Daughter brought his left leg prosthetic from home. Per nurse, intermittently impulsive. Remains under constant supervision. Tolerating trach collar without desaturations. Seen by psych, on standing olanzapine and prn haldol, gabapentin was added for agitation/anxiety in setting of etoh withdrawal.      REVIEW OF SYSTEMS  Unable to assess due to patient condition     VITALS  T(C): 36.4 (12-05-17 @ 09:30), Max: 36.7 (12-04-17 @ 17:06)  HR: 88 (12-05-17 @ 09:30) (66 - 88)  BP: 110/74 (12-05-17 @ 09:30) (105/60 - 118/72)  RR: 19 (12-05-17 @ 09:30) (18 - 19)  SpO2: 98% (12-05-17 @ 09:30) (98% - 100%)  Wt(kg): --    MEDICATIONS   acetaminophen    Suspension 650 milliGRAM(s) every 6 hours PRN  ALBUTerol/ipratropium for Nebulization 3 milliLiter(s) every 6 hours  bisacodyl Suppository 10 milliGRAM(s) daily PRN  chlorhexidine 0.12% Liquid 15 milliLiter(s) two times a day  digoxin     Tablet 0.125 milliGRAM(s) daily  diltiazem    Tablet 60 milliGRAM(s) every 6 hours  enoxaparin Injectable 40 milliGRAM(s) every 24 hours  folic acid 1 milliGRAM(s) daily  gabapentin   Solution 100 milliGRAM(s) every 8 hours  haloperidol    Injectable 2 milliGRAM(s) every 6 hours PRN  lactulose Syrup 10 Gram(s) daily  lisinopril 5 milliGRAM(s) daily  metoprolol     tartrate 25 milliGRAM(s) every 8 hours  metoprolol    tartrate Injectable 5 milliGRAM(s) every 6 hours PRN  multivitamin 1 Tablet(s) daily  OLANZapine 2.5 milliGRAM(s) daily  OLANZapine 5 milliGRAM(s) at bedtime  pantoprazole   Suspension 40 milliGRAM(s) daily  tamsulosin 0.4 milliGRAM(s) at bedtime  thiamine 100 milliGRAM(s) daily      RECENT LABS/IMAGING  CBC Full  -  ( 04 Dec 2017 08:08 )  WBC Count : 7.0 K/uL  Hemoglobin : 9.9 g/dL  Hematocrit : 30.2 %  Platelet Count - Automated : 228 K/uL  Mean Cell Volume : 90.1 fl  Mean Cell Hemoglobin : 29.6 pg  Mean Cell Hemoglobin Concentration : 32.8 g/dL  Auto Neutrophil # : x  Auto Lymphocyte # : x  Auto Monocyte # : x  Auto Eosinophil # : x  Auto Basophil # : x  Auto Neutrophil % : x  Auto Lymphocyte % : x  Auto Monocyte % : x  Auto Eosinophil % : x  Auto Basophil % : x    12-04    136  |  101  |  25.0<H>  ----------------------------<  119<H>  4.1   |  25.0  |  0.61    Ca    8.6      04 Dec 2017 08:08  Phos  3.4     12-04  Mg     1.8     12-04          ---------  PHYSICAL EXAM  Constitutional - NAD, Comfortable sitting in chair   HEENT - NCAT, EOMI  Neck - +Trach  Chest - Coarse breathe sounds  Cardiovascular - S1S2  Abdomen - +PEG, BS+, Soft, NTND  Extremities - Left BKA  Neurologic Exam -                    Cognitive -Alert, responsive to vocal and tactile stimuli, making attempts to communicate, following basic commands     Communication - Mouths words but unable to produce sound likely secondary to trach     Motor - Difficult to assess but no focal deficits noted, moving all extremities freely against gravity      Reflexes - DTR intact and symmetrical, Negative Mcclelland's bilaterally, Negative Babinski's RLE  Skin - Healed left BKA    ASSESSMENT/PLAN  63 y/o male with encephalopathy which may be related to alcohol withdrawal. Course complicated with respiratory failure and pneumonia requiring intubation and eventual tracheostomy.  More alert and attentive/interactive, although continues to be impulsive (pulling at tubes/lines occasionally), danger to self, on constant supervision. Tolerating trach and TFs via PEG.  He is with limited endurance, breathing, swallowing, functional, gait, ADL impairments.    Disposition - Likely a good candidate for acute rehab.  Will continue to follow patient to make final rehabilitation recommendations.  PT - ROM, Bed mobility, OOB to chair  OT - ADLs, ROM  SLP - Dysphagia/swallow eval and treat  Precautions - Falls, Cardiac, trach, PEG, 1:1, left BKA - has prosthesis at bedside

## 2017-12-05 NOTE — PROGRESS NOTE ADULT - ASSESSMENT
62 yr old male with atrial fibrillation, cirrhosis, CHF, alcohol abuse admitted with shortness of breath, admitted for COPD exacerbation, acute diastolic CHF and alcohol abuse, started on steroids, Librium protocol. Cardiology consulted. ECHO and stress test was ordered. He signed out AMA, returned to the hospital agitated, intoxicated. RRT was called later as he was in alcohol withdrawal, ICU was consulted, placed on Valium and Precedex, intubated for airway protection. He was given antibiotics for aspiration, he self extubated on 11/2, but was re intubated on 11/4 for airway protection and altered mental status. ECHO revealed cardiomyopathy. Mental status attributed to encephalopathy and alcohol withdrawal. EEG negative for seizures. He self extubated on 11/7, requiring reintubation. MRI brain unremarkable. He did not tolerate SBT given mental status and copious secretions. CT surgery was consulted for tracheostomy, which was performed on 11/10. Antibiotics changed to Aztreonam for pseudomonas pneumonia. He was transferred out of ICU, rate control medications adjusted for a fib with RVR.  He developed code sepsis on 11/13, he was readmitted to ICU for further management. GI consulted for PEG placement. Cardizem and Lopressor given for A fib with RVR. Medications started on encephalopathy. PEG placed on 11/22. He had episodes of fever, monitored off antibiotics. Digoxin was added for rate control. He was transferred out of medical ICU on 11/29. He developed hypotension on 11/30 after rate control medications were given. BP improved post fluids, heart rate improved.   12/2/2017 Patient continues to suffer from confusion requiring constatnt observation. On 12/3 He is more energetic, sitting up but continues to be confused, not following simple commands. 12/4 Patient is more alert today. He followed simple commands. On 12/5 he is more alert on neurontin but must remain under constant observation.

## 2017-12-05 NOTE — PROGRESS NOTE ADULT - SUBJECTIVE AND OBJECTIVE BOX
XU [id number]     Chief Complaint: Patient is a 62y old  Male who presents with a chief complaint of Shortness of breath (30 Oct 2017 15:03)      PAST MEDICAL & SURGICAL HISTORY:  Falls  Meningitis  Collapsed lung  Alcohol withdrawal  Emphysema of lung  ETOH abuse  Cirrhosis  Afib  CHF (congestive heart failure)  Poor historian  Alcohol abuse  Chronic atrial fibrillation  S/P BKA (below knee amputation), left: secondary to worsening infection in lower leg. Had both ankle injuries from fall s/p repair - left had complication.  S/P BKA (below knee amputation) unilateral, left      HPI/OVERNIGHT EVENTS: Patient more alert today, but still climbing out of bed despite fall risk.    MEDICATIONS  (STANDING):  ALBUTerol/ipratropium for Nebulization 3 milliLiter(s) Nebulizer every 6 hours  chlorhexidine 0.12% Liquid 15 milliLiter(s) Swish and Spit two times a day  digoxin     Tablet 0.125 milliGRAM(s) Oral daily  diltiazem    Tablet 60 milliGRAM(s) Oral every 6 hours  enoxaparin Injectable 40 milliGRAM(s) SubCutaneous every 24 hours  folic acid 1 milliGRAM(s) Oral daily  gabapentin   Solution 100 milliGRAM(s) Oral every 8 hours  lactulose Syrup 10 Gram(s) Oral daily  lisinopril 5 milliGRAM(s) Oral daily  metoprolol     tartrate 25 milliGRAM(s) Oral every 8 hours  multivitamin 1 Tablet(s) Oral daily  OLANZapine 2.5 milliGRAM(s) Oral daily  OLANZapine 5 milliGRAM(s) Oral at bedtime  pantoprazole   Suspension 40 milliGRAM(s) Oral daily  tamsulosin 0.4 milliGRAM(s) Oral at bedtime  thiamine 100 milliGRAM(s) Oral daily      Vital Signs Last 24 Hrs  T(C): 36.4 (05 Dec 2017 09:30), Max: 36.7 (04 Dec 2017 17:06)  T(F): 97.5 (05 Dec 2017 09:30), Max: 98 (04 Dec 2017 17:06)  HR: 88 (05 Dec 2017 09:30) (66 - 88)  BP: 110/74 (05 Dec 2017 09:30) (105/60 - 116/62)  BP(mean): --  RR: 19 (05 Dec 2017 09:30) (18 - 19)  SpO2: 98% (05 Dec 2017 09:30) (98% - 98%)    PHYSICAL EXAM:  Constitutional: NAD, well-groomed, well-developed  HEENT: PERRLA, EOMI, Normal Hearing, MMM  Neck: No LAD, No JVD  Back: Normal spine flexure, No CVA tenderness  Respiratory: CTAB Cardiovascular: S1 and S2, RRR, no M/G/R  Gastrointestinal: BS+, soft, NT/ND  Extremities: No peripheral edema  Vascular: 2+ peripheral pulses  Neurological: confused    CAPILLARY BLOOD GLUCOSE    LABS:                        9.9    7.0   )-----------( 228      ( 04 Dec 2017 08:08 )             30.2     12-04    136  |  101  |  25.0<H>  ----------------------------<  119<H>  4.1   |  25.0  |  0.61    Ca    8.6      04 Dec 2017 08:08  Phos  3.4     12-04  Mg     1.8     12-04            RADIOLOGY & ADDITIONAL TESTS:

## 2017-12-05 NOTE — PROGRESS NOTE ADULT - SUBJECTIVE AND OBJECTIVE BOX
sleeping at present Aide reports restlessness all morning impulsive behaviors fair appetite started on gabapentin 100 mg q 8  discussed status earlier with Dr JOHN Lucas  Vital Signs Last 24 Hrs  T(C): 36.4 (05 Dec 2017 09:30), Max: 36.7 (04 Dec 2017 17:06)  T(F): 97.5 (05 Dec 2017 09:30), Max: 98 (04 Dec 2017 17:06)  HR: 88 (05 Dec 2017 09:30) (66 - 88)  BP: 110/74 (05 Dec 2017 09:30) (105/60 - 116/62)  BP(mean): --  RR: 19 (05 Dec 2017 09:30) (18 - 19)  SpO2: 98% (05 Dec 2017 09:30) (98% - 98%)                        9.9    7.0   )-----------( 228      ( 04 Dec 2017 08:08 )             30.2     12-04    136  |  101  |  25.0<H>  ----------------------------<  119<H>  4.1   |  25.0  |  0.61    Ca    8.6      04 Dec 2017 08:08  Phos  3.4     12-04  Mg     1.8     12-04

## 2017-12-05 NOTE — PROGRESS NOTE ADULT - ASSESSMENT
-Chronic vent failure / tracheostomy; Tolerating TC  -S/p prolonged hospital course  -CHf with normal EF  -AF - presume not on AC secondary to cirrhosis/bleeding risk given mild coagulopathy - would leave to discretion of cardio/GI  -ETOH / cirrhosis / altered mental status- multifactorial but likely chronic ETOH related ( MRI neg CVA)  -COPD  -Mediastinal and L hilar adenopathy  -Agitation.    RECC:  continue nebs. Continue on T/C as tolerates. Optimize cardiac function. Nutritional support. PT/mobilze. Follow chest CT for KWADWO in 2 months- consider bx if persists. Trach care. GI/DVT prophylaxis. Psychiatry f/u.

## 2017-12-05 NOTE — CONSULT NOTE ADULT - CONSULT REASON
Abnormal EKG and SOB
Aspiration
Chronic resp failure.
EtOH abuse, cirrhosis
Possible Tracheostomy
alcohol withdrawal with delirium
PEG tube placement
Rehab evaluation
Rehab evaluation

## 2017-12-05 NOTE — PROGRESS NOTE ADULT - PROBLEM SELECTOR PLAN 3
Maintain CO 1:1, on Olanzapine. Likely secondary to alcohol abuse.  12/4 Patient continues to climb out of bed. 12/5 Slightly better behaved on neurontin.

## 2017-12-05 NOTE — PROGRESS NOTE ADULT - SUBJECTIVE AND OBJECTIVE BOX
PULMONARY PROGRESS NOTE      [id number]    Patient is a 62y old  Male who presents with a chief complaint of Shortness of breath (30 Oct 2017 15:03)      INTERVAL HPI/OVERNIGHT EVENTS: Tolerated T/C 24 hrs. Agitated over breakfast. Sitting in chair.     MEDICATIONS  (STANDING):  ALBUTerol/ipratropium for Nebulization 3 milliLiter(s) Nebulizer every 6 hours  chlorhexidine 0.12% Liquid 15 milliLiter(s) Swish and Spit two times a day  digoxin     Tablet 0.125 milliGRAM(s) Oral daily  diltiazem    Tablet 60 milliGRAM(s) Oral every 6 hours  enoxaparin Injectable 40 milliGRAM(s) SubCutaneous every 24 hours  folic acid 1 milliGRAM(s) Oral daily  gabapentin   Solution 100 milliGRAM(s) Oral every 8 hours  lactulose Syrup 10 Gram(s) Oral daily  lisinopril 5 milliGRAM(s) Oral daily  metoprolol     tartrate 25 milliGRAM(s) Oral every 8 hours  multivitamin 1 Tablet(s) Oral daily  OLANZapine 2.5 milliGRAM(s) Oral daily  OLANZapine 5 milliGRAM(s) Oral at bedtime  pantoprazole   Suspension 40 milliGRAM(s) Oral daily  tamsulosin 0.4 milliGRAM(s) Oral at bedtime  thiamine 100 milliGRAM(s) Oral daily      MEDICATIONS  (PRN):  acetaminophen    Suspension 650 milliGRAM(s) Oral every 6 hours PRN For Temp greater than 38 C (100.4 F)  bisacodyl Suppository 10 milliGRAM(s) Rectal daily PRN Constipation  haloperidol    Injectable 2 milliGRAM(s) IntraMuscular every 6 hours PRN For agitation  metoprolol    tartrate Injectable 5 milliGRAM(s) IV Push every 6 hours PRN HR > 120      Allergies    Ceclor (Rash)    Intolerances        PAST MEDICAL & SURGICAL HISTORY:  Falls  Meningitis  Collapsed lung  Alcohol withdrawal  Emphysema of lung  ETOH abuse  Cirrhosis  Afib  CHF (congestive heart failure)  Poor historian  Alcohol abuse  Chronic atrial fibrillation  S/P BKA (below knee amputation), left: secondary to worsening infection in lower leg. Had both ankle injuries from fall s/p repair - left had complication.  S/P BKA (below knee amputation) unilateral, left                Vital Signs Last 24 Hrs  T(C): 36.4 (05 Dec 2017 09:30), Max: 36.7 (04 Dec 2017 17:06)  T(F): 97.5 (05 Dec 2017 09:30), Max: 98 (04 Dec 2017 17:06)  HR: 88 (05 Dec 2017 09:30) (66 - 88)  BP: 110/74 (05 Dec 2017 09:30) (105/60 - 118/72)  BP(mean): --  RR: 19 (05 Dec 2017 09:30) (18 - 19)  SpO2: 98% (05 Dec 2017 09:30) (98% - 100%)    PHYSICAL EXAMINATION:    GENERAL: The patient is sitting in chair on T/C. Awake and alert agitated. awake and alert in no apparent distress.     NECK: trach.

## 2017-12-05 NOTE — CONSULT NOTE ADULT - ATTENDING COMMENTS
Chart reviewed.    Patient seen at bedside. Seated in chair. Continues to be on 1:1.  Cooperative with exam, but appears frustrated. wants to eat.    Patient would benefit from acute rehab program: PT/OT/ST 3-4 hrs/day 5-6 days,. He will be able tolerate acute rehab program  Consider trach change/downsize if appropriate and swallow evaluation  Thank you. d/w patient's nurse.

## 2017-12-05 NOTE — CHART NOTE - NSCHARTNOTEFT_GEN_A_CORE
Source: EMR     Current Diet: NPO with TF      Enteral /Parenteral Nutrition: Glucerna 1.5 @60ml/hr x24hr     Pt pulling on tubes, feedings on hold     Current Weight: (11/30/17) 98kg- stable     Pertinent Medications: MEDICATIONS  (STANDING):  ALBUTerol/ipratropium for Nebulization 3 milliLiter(s) Nebulizer every 6 hours  chlorhexidine 0.12% Liquid 15 milliLiter(s) Swish and Spit two times a day  digoxin     Tablet 0.125 milliGRAM(s) Oral daily  diltiazem    Tablet 60 milliGRAM(s) Oral every 6 hours  enoxaparin Injectable 40 milliGRAM(s) SubCutaneous every 24 hours  folic acid 1 milliGRAM(s) Oral daily  influenza   Vaccine 0.5 milliLiter(s) IntraMuscular once  lactulose Syrup 10 Gram(s) Oral daily  lisinopril 5 milliGRAM(s) Oral daily  metoprolol     tartrate 25 milliGRAM(s) Oral every 8 hours  multivitamin 1 Tablet(s) Oral daily  OLANZapine 2.5 milliGRAM(s) Oral daily  OLANZapine 5 milliGRAM(s) Oral at bedtime  pantoprazole   Suspension 40 milliGRAM(s) Oral daily  tamsulosin 0.4 milliGRAM(s) Oral at bedtime    MEDICATIONS  (PRN):  acetaminophen    Suspension 650 milliGRAM(s) Oral every 6 hours PRN For Temp greater than 38 C (100.4 F)  bisacodyl Suppository 10 milliGRAM(s) Rectal daily PRN Constipation  haloperidol    Injectable 2 milliGRAM(s) IV Push every 6 hours PRN agitation  metoprolol    tartrate Injectable 5 milliGRAM(s) IV Push every 6 hours PRN HR > 120    Pertinent Labs: CBC Full  -  ( 02 Dec 2017 08:55 )  WBC Count : 6.7 K/uL  Hemoglobin : 9.6 g/dL  Hematocrit : 30.3 %  Platelet Count - Automated : 254 K/uL  Mean Cell Volume : 93.5 fl  Mean Cell Hemoglobin : 29.6 pg  Mean Cell Hemoglobin Concentration : 31.7 g/dL      Skin: Intact     Estimated Needs:   [X] no change since previous assessment  [ ] recalculated:     Current Nutrition Diagnosis: Pt at nutritional risk secondary to increased nutrient needs related to ETOH withdrawal, respiratory failure, s/p trach as evidenced by increased estimated nutrient needs to optimize nutritional status.    Recommendations: Continue TF as tolerated     PER RN REQUEST, IF BOLUS FEED DONE: 2 CANS OF GLUCERNA (474ml) TID for total of 1422ml daily.     Monitoring and Evaluation:    [X] Weights  [X] Follow up per protocol [X] Labs:

## 2017-12-06 LAB
ANION GAP SERPL CALC-SCNC: 12 MMOL/L — SIGNIFICANT CHANGE UP (ref 5–17)
BUN SERPL-MCNC: 34 MG/DL — HIGH (ref 8–20)
CALCIUM SERPL-MCNC: 9.1 MG/DL — SIGNIFICANT CHANGE UP (ref 8.6–10.2)
CHLORIDE SERPL-SCNC: 104 MMOL/L — SIGNIFICANT CHANGE UP (ref 98–107)
CO2 SERPL-SCNC: 26 MMOL/L — SIGNIFICANT CHANGE UP (ref 22–29)
CREAT SERPL-MCNC: 0.83 MG/DL — SIGNIFICANT CHANGE UP (ref 0.5–1.3)
GLUCOSE SERPL-MCNC: 90 MG/DL — SIGNIFICANT CHANGE UP (ref 70–115)
HCT VFR BLD CALC: 31.5 % — LOW (ref 42–52)
HGB BLD-MCNC: 10.3 G/DL — LOW (ref 14–18)
MAGNESIUM SERPL-MCNC: 1.8 MG/DL — SIGNIFICANT CHANGE UP (ref 1.6–2.6)
MCHC RBC-ENTMCNC: 30.1 PG — SIGNIFICANT CHANGE UP (ref 27–31)
MCHC RBC-ENTMCNC: 32.7 G/DL — SIGNIFICANT CHANGE UP (ref 32–36)
MCV RBC AUTO: 92.1 FL — SIGNIFICANT CHANGE UP (ref 80–94)
PHOSPHATE SERPL-MCNC: 3 MG/DL — SIGNIFICANT CHANGE UP (ref 2.4–4.7)
PLATELET # BLD AUTO: 243 K/UL — SIGNIFICANT CHANGE UP (ref 150–400)
POTASSIUM SERPL-MCNC: 4.4 MMOL/L — SIGNIFICANT CHANGE UP (ref 3.5–5.3)
POTASSIUM SERPL-SCNC: 4.4 MMOL/L — SIGNIFICANT CHANGE UP (ref 3.5–5.3)
RBC # BLD: 3.42 M/UL — LOW (ref 4.6–6.2)
RBC # FLD: 15.5 % — SIGNIFICANT CHANGE UP (ref 11–15.6)
SODIUM SERPL-SCNC: 142 MMOL/L — SIGNIFICANT CHANGE UP (ref 135–145)
WBC # BLD: 6.7 K/UL — SIGNIFICANT CHANGE UP (ref 4.8–10.8)
WBC # FLD AUTO: 6.7 K/UL — SIGNIFICANT CHANGE UP (ref 4.8–10.8)

## 2017-12-06 PROCEDURE — 99232 SBSQ HOSP IP/OBS MODERATE 35: CPT

## 2017-12-06 PROCEDURE — 99233 SBSQ HOSP IP/OBS HIGH 50: CPT

## 2017-12-06 RX ORDER — GABAPENTIN 400 MG/1
300 CAPSULE ORAL EVERY 8 HOURS
Qty: 0 | Refills: 0 | Status: DISCONTINUED | OUTPATIENT
Start: 2017-12-06 | End: 2017-12-14

## 2017-12-06 RX ORDER — OLANZAPINE 15 MG/1
10 TABLET, FILM COATED ORAL
Qty: 0 | Refills: 0 | Status: DISCONTINUED | OUTPATIENT
Start: 2017-12-06 | End: 2017-12-07

## 2017-12-06 RX ORDER — ALPRAZOLAM 0.25 MG
1 TABLET ORAL EVERY 6 HOURS
Qty: 0 | Refills: 0 | Status: DISCONTINUED | OUTPATIENT
Start: 2017-12-06 | End: 2017-12-07

## 2017-12-06 RX ADMIN — GABAPENTIN 200 MILLIGRAM(S): 400 CAPSULE ORAL at 06:11

## 2017-12-06 RX ADMIN — PANTOPRAZOLE SODIUM 40 MILLIGRAM(S): 20 TABLET, DELAYED RELEASE ORAL at 11:54

## 2017-12-06 RX ADMIN — GABAPENTIN 300 MILLIGRAM(S): 400 CAPSULE ORAL at 22:00

## 2017-12-06 RX ADMIN — Medication 3 MILLILITER(S): at 09:03

## 2017-12-06 RX ADMIN — TAMSULOSIN HYDROCHLORIDE 0.4 MILLIGRAM(S): 0.4 CAPSULE ORAL at 21:55

## 2017-12-06 RX ADMIN — ENOXAPARIN SODIUM 40 MILLIGRAM(S): 100 INJECTION SUBCUTANEOUS at 11:57

## 2017-12-06 RX ADMIN — CHLORHEXIDINE GLUCONATE 15 MILLILITER(S): 213 SOLUTION TOPICAL at 18:36

## 2017-12-06 RX ADMIN — CHLORHEXIDINE GLUCONATE 15 MILLILITER(S): 213 SOLUTION TOPICAL at 06:11

## 2017-12-06 RX ADMIN — Medication 1 TABLET(S): at 11:53

## 2017-12-06 RX ADMIN — LISINOPRIL 5 MILLIGRAM(S): 2.5 TABLET ORAL at 06:11

## 2017-12-06 RX ADMIN — Medication 3 MILLILITER(S): at 20:39

## 2017-12-06 RX ADMIN — Medication 1 MILLIGRAM(S): at 21:54

## 2017-12-06 RX ADMIN — Medication 0.25 MILLIGRAM(S): at 06:10

## 2017-12-06 RX ADMIN — Medication 25 MILLIGRAM(S): at 21:54

## 2017-12-06 RX ADMIN — Medication 1 MILLIGRAM(S): at 11:53

## 2017-12-06 RX ADMIN — Medication 1 MILLIGRAM(S): at 13:12

## 2017-12-06 RX ADMIN — Medication 3 MILLILITER(S): at 04:00

## 2017-12-06 RX ADMIN — Medication 25 MILLIGRAM(S): at 18:36

## 2017-12-06 RX ADMIN — Medication 3 MILLILITER(S): at 15:26

## 2017-12-06 RX ADMIN — GABAPENTIN 300 MILLIGRAM(S): 400 CAPSULE ORAL at 12:35

## 2017-12-06 RX ADMIN — LACTULOSE 10 GRAM(S): 10 SOLUTION ORAL at 11:57

## 2017-12-06 RX ADMIN — OLANZAPINE 10 MILLIGRAM(S): 15 TABLET, FILM COATED ORAL at 18:36

## 2017-12-06 RX ADMIN — Medication 0.12 MILLIGRAM(S): at 06:11

## 2017-12-06 RX ADMIN — Medication 25 MILLIGRAM(S): at 06:11

## 2017-12-06 RX ADMIN — Medication 100 MILLIGRAM(S): at 11:54

## 2017-12-06 NOTE — PROGRESS NOTE ADULT - ASSESSMENT
62 yr old male with atrial fibrillation, cirrhosis, CHF, alcohol abuse admitted with shortness of breath, admitted for COPD exacerbation, acute diastolic CHF and alcohol abuse, started on steroids, Librium protocol. Cardiology consulted. ECHO and stress test was ordered. He signed out AMA, returned to the hospital agitated, intoxicated. RRT was called later as he was in alcohol withdrawal, ICU was consulted, placed on Valium and Precedex, intubated for airway protection. He was given antibiotics for aspiration, he self extubated on 11/2, but was re intubated on 11/4 for airway protection and altered mental status. ECHO revealed cardiomyopathy. Mental status attributed to encephalopathy and alcohol withdrawal. EEG negative for seizures. He self extubated on 11/7, requiring reintubation. MRI brain unremarkable. He did not tolerate SBT given mental status and copious secretions. CT surgery was consulted for tracheostomy, which was performed on 11/10. Antibiotics changed to Aztreonam for pseudomonas pneumonia. He was transferred out of ICU, rate control medications adjusted for a fib with RVR.  He developed code sepsis on 11/13, he was readmitted to ICU for further management. GI consulted for PEG placement. Cardizem and Lopressor given for A fib with RVR. Medications started on encephalopathy. PEG placed on 11/22. He had episodes of fever, monitored off antibiotics. Digoxin was added for rate control. He was transferred out of medical ICU on 11/29. He developed hypotension on 11/30 after rate control medications were given. BP improved post fluids, heart rate improved.   12/2/2017 Patient continues to suffer from confusion requiring constatnt observation. On 12/3 He is more energetic, sitting up but continues to be confused, not following simple commands. 12/4 Patient is more alert today. He followed simple commands. On 12/5 he is more alert on neurontin but must remain under constant observation. On 12/6 psyche increased olanzapine.

## 2017-12-06 NOTE — PROGRESS NOTE ADULT - SUBJECTIVE AND OBJECTIVE BOX
STONE TAMEZKOSTA     Chief Complaint: Patient is a 62y old  Male who presents with a chief complaint of Shortness of breath (30 Oct 2017 15:03)      PAST MEDICAL & SURGICAL HISTORY:  Falls  Meningitis  Collapsed lung  Alcohol withdrawal  Emphysema of lung  ETOH abuse  Cirrhosis  Afib  CHF (congestive heart failure)  Poor historian  Alcohol abuse  Chronic atrial fibrillation  S/P BKA (below knee amputation), left: secondary to worsening infection in lower leg. Had both ankle injuries from fall s/p repair - left had complication.  S/P BKA (below knee amputation) unilateral, left      HPI/OVERNIGHT EVENTS: Patient was hyper this morning. Psych has increased olanzapine.    MEDICATIONS  (STANDING):  ALBUTerol/ipratropium for Nebulization 3 milliLiter(s) Nebulizer every 6 hours  chlorhexidine 0.12% Liquid 15 milliLiter(s) Swish and Spit two times a day  digoxin     Tablet 0.125 milliGRAM(s) Oral daily  diltiazem    Tablet 60 milliGRAM(s) Oral every 6 hours  enoxaparin Injectable 40 milliGRAM(s) SubCutaneous every 24 hours  folic acid 1 milliGRAM(s) Oral daily  gabapentin   Solution 300 milliGRAM(s) Oral every 8 hours  lactulose Syrup 10 Gram(s) Oral daily  lisinopril 5 milliGRAM(s) Oral daily  metoprolol     tartrate 25 milliGRAM(s) Oral every 8 hours  multivitamin 1 Tablet(s) Oral daily  OLANZapine 10 milliGRAM(s) Oral two times a day  pantoprazole   Suspension 40 milliGRAM(s) Oral daily  tamsulosin 0.4 milliGRAM(s) Oral at bedtime  thiamine 100 milliGRAM(s) Oral daily      Vital Signs Last 24 Hrs  T(C): 36.4 (06 Dec 2017 08:21), Max: 38.1 (05 Dec 2017 23:35)  T(F): 97.6 (06 Dec 2017 08:21), Max: 100.5 (05 Dec 2017 23:35)  HR: 83 (06 Dec 2017 08:21) (83 - 109)  BP: 115/56 (06 Dec 2017 08:21) (106/55 - 131/68)  BP(mean): --  RR: 18 (06 Dec 2017 08:21) (18 - 24)  SpO2: 99% (06 Dec 2017 08:21) (94% - 99%)    PHYSICAL EXAM:  Constitutional: NAD, well-groomed, well-developed  HEENT: tracheostomy  Neck: No LAD, No JVD  Back: Normal spine flexure, No CVA tenderness  Respiratory: CTAB Cardiovascular: S1 and S2, RRR, no M/G/R  Gastrointestinal:  peg  Extremities: No peripheral edema  Vascular: 2+ peripheral pulses  Neurological:  confused    CAPILLARY BLOOD GLUCOSE    LABS:                        10.3   6.7   )-----------( 243      ( 06 Dec 2017 08:39 )             31.5     12-06    142  |  104  |  34.0<H>  ----------------------------<  90  4.4   |  26.0  |  0.83    Ca    9.1      06 Dec 2017 08:39  Phos  3.0     12-06  Mg     1.8     12-06            RADIOLOGY & ADDITIONAL TESTS:

## 2017-12-07 LAB
ANION GAP SERPL CALC-SCNC: 10 MMOL/L — SIGNIFICANT CHANGE UP (ref 5–17)
BUN SERPL-MCNC: 30 MG/DL — HIGH (ref 8–20)
CALCIUM SERPL-MCNC: 8.7 MG/DL — SIGNIFICANT CHANGE UP (ref 8.6–10.2)
CHLORIDE SERPL-SCNC: 104 MMOL/L — SIGNIFICANT CHANGE UP (ref 98–107)
CO2 SERPL-SCNC: 25 MMOL/L — SIGNIFICANT CHANGE UP (ref 22–29)
CREAT SERPL-MCNC: 0.69 MG/DL — SIGNIFICANT CHANGE UP (ref 0.5–1.3)
GLUCOSE SERPL-MCNC: 98 MG/DL — SIGNIFICANT CHANGE UP (ref 70–115)
HCT VFR BLD CALC: 27.9 % — LOW (ref 42–52)
HGB BLD-MCNC: 9 G/DL — LOW (ref 14–18)
MCHC RBC-ENTMCNC: 30.2 PG — SIGNIFICANT CHANGE UP (ref 27–31)
MCHC RBC-ENTMCNC: 32.3 G/DL — SIGNIFICANT CHANGE UP (ref 32–36)
MCV RBC AUTO: 93.6 FL — SIGNIFICANT CHANGE UP (ref 80–94)
PLATELET # BLD AUTO: 203 K/UL — SIGNIFICANT CHANGE UP (ref 150–400)
POTASSIUM SERPL-MCNC: 4.3 MMOL/L — SIGNIFICANT CHANGE UP (ref 3.5–5.3)
POTASSIUM SERPL-SCNC: 4.3 MMOL/L — SIGNIFICANT CHANGE UP (ref 3.5–5.3)
RBC # BLD: 2.98 M/UL — LOW (ref 4.6–6.2)
RBC # FLD: 15.8 % — HIGH (ref 11–15.6)
SODIUM SERPL-SCNC: 139 MMOL/L — SIGNIFICANT CHANGE UP (ref 135–145)
WBC # BLD: 5.6 K/UL — SIGNIFICANT CHANGE UP (ref 4.8–10.8)
WBC # FLD AUTO: 5.6 K/UL — SIGNIFICANT CHANGE UP (ref 4.8–10.8)

## 2017-12-07 PROCEDURE — 99233 SBSQ HOSP IP/OBS HIGH 50: CPT

## 2017-12-07 PROCEDURE — 99232 SBSQ HOSP IP/OBS MODERATE 35: CPT

## 2017-12-07 RX ORDER — OLANZAPINE 15 MG/1
5 TABLET, FILM COATED ORAL
Qty: 0 | Refills: 0 | Status: DISCONTINUED | OUTPATIENT
Start: 2017-12-07 | End: 2017-12-08

## 2017-12-07 RX ORDER — ALPRAZOLAM 0.25 MG
0.5 TABLET ORAL EVERY 6 HOURS
Qty: 0 | Refills: 0 | Status: DISCONTINUED | OUTPATIENT
Start: 2017-12-07 | End: 2017-12-07

## 2017-12-07 RX ADMIN — Medication 3 MILLILITER(S): at 20:46

## 2017-12-07 RX ADMIN — Medication 1 MILLIGRAM(S): at 03:53

## 2017-12-07 RX ADMIN — Medication 25 MILLIGRAM(S): at 05:38

## 2017-12-07 RX ADMIN — GABAPENTIN 300 MILLIGRAM(S): 400 CAPSULE ORAL at 22:03

## 2017-12-07 RX ADMIN — Medication 3 MILLILITER(S): at 09:08

## 2017-12-07 RX ADMIN — TAMSULOSIN HYDROCHLORIDE 0.4 MILLIGRAM(S): 0.4 CAPSULE ORAL at 22:03

## 2017-12-07 RX ADMIN — OLANZAPINE 5 MILLIGRAM(S): 15 TABLET, FILM COATED ORAL at 18:30

## 2017-12-07 RX ADMIN — GABAPENTIN 300 MILLIGRAM(S): 400 CAPSULE ORAL at 05:38

## 2017-12-07 RX ADMIN — Medication 0.12 MILLIGRAM(S): at 05:38

## 2017-12-07 RX ADMIN — Medication 3 MILLILITER(S): at 16:00

## 2017-12-07 RX ADMIN — Medication 3 MILLILITER(S): at 03:40

## 2017-12-07 RX ADMIN — CHLORHEXIDINE GLUCONATE 15 MILLILITER(S): 213 SOLUTION TOPICAL at 05:38

## 2017-12-07 RX ADMIN — OLANZAPINE 10 MILLIGRAM(S): 15 TABLET, FILM COATED ORAL at 05:38

## 2017-12-07 NOTE — PROGRESS NOTE ADULT - SUBJECTIVE AND OBJECTIVE BOX
PULMONARY PROGRESS NOTE      STONE DIAZ  MRN-[id number]    Patient is a 62y old  Male who presents with a chief complaint of Shortness of breath (30 Oct 2017 15:03)      INTERVAL HPI/OVERNIGHT EVENTS:    Doing well off vent x 3 days  Tolerating TC and doing well  Remains agitated at times    MEDICATIONS  (STANDING):  ALBUTerol/ipratropium for Nebulization 3 milliLiter(s) Nebulizer every 6 hours  chlorhexidine 0.12% Liquid 15 milliLiter(s) Swish and Spit two times a day  digoxin     Tablet 0.125 milliGRAM(s) Oral daily  diltiazem    Tablet 60 milliGRAM(s) Oral every 6 hours  enoxaparin Injectable 40 milliGRAM(s) SubCutaneous every 24 hours  folic acid 1 milliGRAM(s) Oral daily  gabapentin   Solution 300 milliGRAM(s) Oral every 8 hours  lactulose Syrup 10 Gram(s) Oral daily  lisinopril 5 milliGRAM(s) Oral daily  metoprolol     tartrate 25 milliGRAM(s) Oral every 8 hours  multivitamin 1 Tablet(s) Oral daily  OLANZapine 5 milliGRAM(s) Oral two times a day  pantoprazole   Suspension 40 milliGRAM(s) Oral daily  tamsulosin 0.4 milliGRAM(s) Oral at bedtime  thiamine 100 milliGRAM(s) Oral daily      MEDICATIONS  (PRN):  acetaminophen    Suspension 650 milliGRAM(s) Oral every 6 hours PRN For Temp greater than 38 C (100.4 F)  bisacodyl Suppository 10 milliGRAM(s) Rectal daily PRN Constipation  haloperidol    Injectable 2 milliGRAM(s) IntraMuscular every 6 hours PRN For agitation  metoprolol    tartrate Injectable 5 milliGRAM(s) IV Push every 6 hours PRN HR > 120      Allergies    Ceclor (Rash)    Intolerances        PAST MEDICAL & SURGICAL HISTORY:  Falls  Meningitis  Collapsed lung  Alcohol withdrawal  Emphysema of lung  ETOH abuse  Cirrhosis  Afib  CHF (congestive heart failure)  Poor historian  Alcohol abuse  Chronic atrial fibrillation  S/P BKA (below knee amputation), left: secondary to worsening infection in lower leg. Had both ankle injuries from fall s/p repair - left had complication.  S/P BKA (below knee amputation) unilateral, left        REVIEW OF SYSTEMS: Unable to provide      Vital Signs Last 24 Hrs  T(C): 37.2 (07 Dec 2017 14:00), Max: 37.2 (07 Dec 2017 14:00)  T(F): 99 (07 Dec 2017 14:00), Max: 99 (07 Dec 2017 14:00)  HR: 64 (07 Dec 2017 14:00) (64 - 97)  BP: 103/52 (07 Dec 2017 14:00) (100/50 - 104/50)  BP(mean): --  RR: 18 (07 Dec 2017 14:00) (18 - 20)  SpO2: 98% (07 Dec 2017 14:00) (88% - 98%)    PHYSICAL EXAMINATION:    GENERAL: The patient is awake and alert in no apparent distress.     HEENT: Head is normocephalic and atraumatic. Extraocular muscles are intact. Mucous membranes are moist.    NECK: Supple. + trach    LUNGS: Clear to auscultation without wheezing, rales or rhonchi; respirations unlabored    HEART: Regular rate and rhythm without murmur.    ABDOMEN: Soft, nontender, and nondistended.      EXTREMITIES: Without any cyanosis, clubbing, rash, lesions or edema.    NEUROLOGIC: Grossly intact.    LABS:                        9.0    5.6   )-----------( 203      ( 07 Dec 2017 11:10 )             27.9     12-07    139  |  104  |  30.0<H>  ----------------------------<  98  4.3   |  25.0  |  0.69    Ca    8.7      07 Dec 2017 11:10  Phos  3.0     12-06  Mg     1.8     12-06

## 2017-12-07 NOTE — PROGRESS NOTE BEHAVIORAL HEALTH - NSBHFUPINTERVALHXFT_PSY_A_CORE
Recent h/p  extreme agitation which precludes SNF placement. Zyprexa, Neurontin and prn Xanax increased yesterday. RN reports patient was restless during the night and did not fall asleep until 5 am. Patient received prn Xanax at 3:53 this am. He is currently lethargic. RN held most am meds. Opens eyes and then falls back to sleep. Recent h/p  extreme agitation which precludes SNF placement. Zyprexa, Neurontin and prn Xanax increased yesterday. Patient was receiving Zyprexa 5 mg daily which was increased to 10 mg q12, Neurontin was increased from 200 mg tid to 300 mg tid, Xanax increased from 0.25 mg q8h prn to Xanax 1 mg q8h prn. RN reports patient was restless during the night and did not fall asleep until 5 am. Patient received prn Xanax 1 mg at 3:53 this am. He is currently lethargic. RN held most am meds 2/2 lethargy.  Opens eyes and then falls back to sleep. 62 year old male with COPD A fibBKA, now with trach collar was treated with benzodiazepines for alcohol withdrawal delirium remains with hyperactive delirium confusion inability to follow multistage commands psychiatric history is not reported. Recent h/p  extreme agitation which precludes SNF placement.   Zyprexa, Neurontin and prn Xanax increased yesterday. Patient was receiving Zyprexa 5 mg daily which was increased to 10 mg q12, Neurontin was increased from 200 mg tid to 300 mg tid, Xanax increased from 0.25 mg q8h prn to Xanax 1 mg q8h prn. RN reports patient was restless during the night and did not fall asleep until 5 am. Patient received prn Xanax 1 mg at 3:53 this am. He is currently lethargic. RN held most am meds 2/2 lethargy.  Opens eyes and then falls back to sleep.

## 2017-12-07 NOTE — PROGRESS NOTE ADULT - PROBLEM SELECTOR PLAN 3
Maintain CO 1:1, on Olanzapine. Likely secondary to alcohol abuse.  12/4 Patient continues to climb out of bed. 12/5 Slightly better behaved on neurontin. 12/6 olanzapine increased.

## 2017-12-07 NOTE — PROGRESS NOTE BEHAVIORAL HEALTH - PROBLEM SELECTOR PLAN 1
decreased Zyprexa to 5 mg bid, may need to increase to 5 mg qam and 10 mg hs if agitation returns.   increase gabapentin to 300 mg q 8
increase olanzapine to 10 mg q 12  increase gabapentin to 300 mg q 8

## 2017-12-07 NOTE — PROGRESS NOTE ADULT - ASSESSMENT
62 yr old male with atrial fibrillation, cirrhosis, CHF, alcohol abuse admitted with shortness of breath, admitted for COPD exacerbation, acute diastolic CHF and alcohol abuse, started on steroids, Librium protocol. Cardiology consulted. ECHO and stress test was ordered. He signed out AMA, returned to the hospital agitated, intoxicated. RRT was called later as he was in alcohol withdrawal, ICU was consulted, placed on Valium and Precedex, intubated for airway protection. He was given antibiotics for aspiration, he self extubated on 11/2, but was re intubated on 11/4 for airway protection and altered mental status. ECHO revealed cardiomyopathy. Mental status attributed to encephalopathy and alcohol withdrawal. EEG negative for seizures. He self extubated on 11/7, requiring reintubation. MRI brain unremarkable. He did not tolerate SBT given mental status and copious secretions. CT surgery was consulted for tracheostomy, which was performed on 11/10. Antibiotics changed to Aztreonam for pseudomonas pneumonia. He was transferred out of ICU, rate control medications adjusted for a fib with RVR.  He developed code sepsis on 11/13, he was readmitted to ICU for further management. GI consulted for PEG placement. Cardizem and Lopressor given for A fib with RVR. Medications started on encephalopathy. PEG placed on 11/22. He had episodes of fever, monitored off antibiotics. Digoxin was added for rate control. He was transferred out of medical ICU on 11/29. He developed hypotension on 11/30 after rate control medications were given. BP improved post fluids, heart rate improved.   12/2/2017 Patient continues to suffer from confusion requiring constatnt observation. On 12/3 He is more energetic, sitting up but continues to be confused, not following simple commands. 12/4 Patient is more alert today. He followed simple commands. On 12/5 he is more alert on neurontin but must remain under constant observation. On 12/6 psyche increased olanzapine. 12/7 Patient sedated reduce olanzapine to 5mg bid and discontinue xanax.

## 2017-12-07 NOTE — PROGRESS NOTE ADULT - SUBJECTIVE AND OBJECTIVE BOX
STONE DIAZ     Chief Complaint: Patient is a 62y old  Male who presents with a chief complaint of Shortness of breath (30 Oct 2017 15:03)      PAST MEDICAL & SURGICAL HISTORY:  Falls  Meningitis  Collapsed lung  Alcohol withdrawal  Emphysema of lung  ETOH abuse  Cirrhosis  Afib  CHF (congestive heart failure)  Poor historian  Alcohol abuse  Chronic atrial fibrillation  S/P BKA (below knee amputation), left: secondary to worsening infection in lower leg. Had both ankle injuries from fall s/p repair - left had complication.  S/P BKA (below knee amputation) unilateral, left      HPI/OVERNIGHT EVENTS: Patients is sleeping today and difficult to arouse.    MEDICATIONS  (STANDING):  ALBUTerol/ipratropium for Nebulization 3 milliLiter(s) Nebulizer every 6 hours  chlorhexidine 0.12% Liquid 15 milliLiter(s) Swish and Spit two times a day  digoxin     Tablet 0.125 milliGRAM(s) Oral daily  diltiazem    Tablet 60 milliGRAM(s) Oral every 6 hours  enoxaparin Injectable 40 milliGRAM(s) SubCutaneous every 24 hours  folic acid 1 milliGRAM(s) Oral daily  gabapentin   Solution 300 milliGRAM(s) Oral every 8 hours  lactulose Syrup 10 Gram(s) Oral daily  lisinopril 5 milliGRAM(s) Oral daily  metoprolol     tartrate 25 milliGRAM(s) Oral every 8 hours  multivitamin 1 Tablet(s) Oral daily  OLANZapine 5 milliGRAM(s) Oral two times a day  pantoprazole   Suspension 40 milliGRAM(s) Oral daily  tamsulosin 0.4 milliGRAM(s) Oral at bedtime  thiamine 100 milliGRAM(s) Oral daily      Vital Signs Last 24 Hrs  T(C): 37.2 (07 Dec 2017 14:00), Max: 37.2 (07 Dec 2017 14:00)  T(F): 99 (07 Dec 2017 14:00), Max: 99 (07 Dec 2017 14:00)  HR: 64 (07 Dec 2017 14:00) (64 - 97)  BP: 103/52 (07 Dec 2017 14:00) (100/50 - 104/50)  BP(mean): --  RR: 18 (07 Dec 2017 14:00) (18 - 20)  SpO2: 98% (07 Dec 2017 14:00) (88% - 98%)    PHYSICAL EXAM:  Constitutional: NAD, well-groomed, well-developed  HEENT: PERRLA, EOMI, Normal Hearing, MMM  Neck: tracheostomy  Back: Normal spine flexure, No CVA tenderness  Respiratory: CTAB Cardiovascular: S1 and S2, RRR, no M/G/R  Gastrointestinal: BS+, soft, NT/ND  Extremities: No peripheral edema  Vascular: 2+ peripheral pulses  Neurological: sedated    CAPILLARY BLOOD GLUCOSE    LABS:                        9.0    5.6   )-----------( 203      ( 07 Dec 2017 11:10 )             27.9     12-07    139  |  104  |  30.0<H>  ----------------------------<  98  4.3   |  25.0  |  0.69    Ca    8.7      07 Dec 2017 11:10  Phos  3.0     12-06  Mg     1.8     12-06            RADIOLOGY & ADDITIONAL TESTS:

## 2017-12-07 NOTE — PROGRESS NOTE ADULT - ASSESSMENT
Chronic vent failure / tracheostomy  Tolerating TC  Doing well off vent - will d/c vent  S/p prolonged hospital course  CHF with normal EF  AF - presume not on AC secondary to cirrhosis/bleeding risk given mild coagulopathy - would leave to discretion of cardio/GI  ETOH / cirrhosis / altered mental status- multifactorial but likely chronic ETOH related ( MRI neg CVA)  COPD  Mediastinal and L hilar adenopathy  Agitation.    Rec:  Drug nebs  Continue on T/C as tolerates  D/c vetn  Optimize cardiac function  Nutritional support  PT/mobilize   Follow chest CT for KWADWO in 2 months- consider bx if persists  Trach care  GI/DVT prophylaxis  Psychiatry f/u  For placement

## 2017-12-08 PROCEDURE — 99232 SBSQ HOSP IP/OBS MODERATE 35: CPT

## 2017-12-08 PROCEDURE — 99233 SBSQ HOSP IP/OBS HIGH 50: CPT

## 2017-12-08 RX ORDER — OLANZAPINE 15 MG/1
10 TABLET, FILM COATED ORAL
Qty: 0 | Refills: 0 | Status: DISCONTINUED | OUTPATIENT
Start: 2017-12-08 | End: 2017-12-14

## 2017-12-08 RX ORDER — ALPRAZOLAM 0.25 MG
0.5 TABLET ORAL ONCE
Qty: 0 | Refills: 0 | Status: DISCONTINUED | OUTPATIENT
Start: 2017-12-08 | End: 2017-12-08

## 2017-12-08 RX ADMIN — Medication 1 TABLET(S): at 13:10

## 2017-12-08 RX ADMIN — Medication 3 MILLILITER(S): at 03:08

## 2017-12-08 RX ADMIN — GABAPENTIN 300 MILLIGRAM(S): 400 CAPSULE ORAL at 05:16

## 2017-12-08 RX ADMIN — OLANZAPINE 5 MILLIGRAM(S): 15 TABLET, FILM COATED ORAL at 05:16

## 2017-12-08 RX ADMIN — HALOPERIDOL DECANOATE 2 MILLIGRAM(S): 100 INJECTION INTRAMUSCULAR at 13:08

## 2017-12-08 RX ADMIN — TAMSULOSIN HYDROCHLORIDE 0.4 MILLIGRAM(S): 0.4 CAPSULE ORAL at 23:08

## 2017-12-08 RX ADMIN — Medication 25 MILLIGRAM(S): at 13:09

## 2017-12-08 RX ADMIN — LACTULOSE 10 GRAM(S): 10 SOLUTION ORAL at 13:08

## 2017-12-08 RX ADMIN — Medication 0.5 MILLIGRAM(S): at 01:17

## 2017-12-08 RX ADMIN — PANTOPRAZOLE SODIUM 40 MILLIGRAM(S): 20 TABLET, DELAYED RELEASE ORAL at 13:09

## 2017-12-08 RX ADMIN — Medication 100 MILLIGRAM(S): at 13:09

## 2017-12-08 RX ADMIN — Medication 1 MILLIGRAM(S): at 13:08

## 2017-12-08 RX ADMIN — GABAPENTIN 300 MILLIGRAM(S): 400 CAPSULE ORAL at 23:07

## 2017-12-08 RX ADMIN — GABAPENTIN 300 MILLIGRAM(S): 400 CAPSULE ORAL at 13:08

## 2017-12-08 RX ADMIN — Medication 3 MILLILITER(S): at 15:36

## 2017-12-08 RX ADMIN — Medication 3 MILLILITER(S): at 20:41

## 2017-12-08 RX ADMIN — Medication 3 MILLILITER(S): at 09:09

## 2017-12-08 RX ADMIN — Medication 25 MILLIGRAM(S): at 05:16

## 2017-12-08 RX ADMIN — ENOXAPARIN SODIUM 40 MILLIGRAM(S): 100 INJECTION SUBCUTANEOUS at 13:10

## 2017-12-08 RX ADMIN — OLANZAPINE 10 MILLIGRAM(S): 15 TABLET, FILM COATED ORAL at 13:09

## 2017-12-08 RX ADMIN — CHLORHEXIDINE GLUCONATE 15 MILLILITER(S): 213 SOLUTION TOPICAL at 05:15

## 2017-12-08 RX ADMIN — Medication 0.12 MILLIGRAM(S): at 05:16

## 2017-12-08 NOTE — PROGRESS NOTE ADULT - PROBLEM SELECTOR PLAN 1
S/p trach collar, off antibiotics. Trach care, suctioning.  12/8 Patient tolerating trache collar constantly.

## 2017-12-08 NOTE — PROGRESS NOTE ADULT - ASSESSMENT
Chronic vent failure resolved  Tolerating TC  AF - presume not on AC secondary to cirrhosis/bleeding risk given mild coagulopathy - would leave to discretion of cardio/GI  ETOH / cirrhosis / altered mental status- multifactorial but likely chronic ETOH related ( MRI neg CVA)  COPD stable  Mediastinal and L hilar adenopathy  Agitation.    Rec:  Drug nebs  Continue on T/C as tolerates and will wean next week  Optimize cardiac function  Nutritional support  PT/mobilize   Follow chest CT for KWADWO in 2 months- consider bx if persists  Trach care  GI/DVT prophylaxis  Psychiatry f/u  For placement

## 2017-12-08 NOTE — PROGRESS NOTE ADULT - SUBJECTIVE AND OBJECTIVE BOX
PULMONARY PROGRESS NOTE      [id number]    Patient is a 62y old  Male who presents with a chief complaint of Shortness of breath (30 Oct 2017 15:03)      INTERVAL HPI/OVERNIGHT EVENTS:  comfortable on TC  Minimal secretions    MEDICATIONS  (STANDING):  ALBUTerol/ipratropium for Nebulization 3 milliLiter(s) Nebulizer every 6 hours  chlorhexidine 0.12% Liquid 15 milliLiter(s) Swish and Spit two times a day  digoxin     Tablet 0.125 milliGRAM(s) Oral daily  diltiazem    Tablet 60 milliGRAM(s) Oral every 6 hours  enoxaparin Injectable 40 milliGRAM(s) SubCutaneous every 24 hours  folic acid 1 milliGRAM(s) Oral daily  gabapentin   Solution 300 milliGRAM(s) Oral every 8 hours  lactulose Syrup 10 Gram(s) Oral daily  lisinopril 5 milliGRAM(s) Oral daily  metoprolol     tartrate 25 milliGRAM(s) Oral every 8 hours  multivitamin 1 Tablet(s) Oral daily  OLANZapine 10 milliGRAM(s) Oral two times a day  pantoprazole   Suspension 40 milliGRAM(s) Oral daily  tamsulosin 0.4 milliGRAM(s) Oral at bedtime  thiamine 100 milliGRAM(s) Oral daily      MEDICATIONS  (PRN):  acetaminophen    Suspension 650 milliGRAM(s) Oral every 6 hours PRN For Temp greater than 38 C (100.4 F)  bisacodyl Suppository 10 milliGRAM(s) Rectal daily PRN Constipation  haloperidol    Injectable 2 milliGRAM(s) IntraMuscular every 6 hours PRN For agitation  metoprolol    tartrate Injectable 5 milliGRAM(s) IV Push every 6 hours PRN HR > 120      Allergies    Ceclor (Rash)    Intolerances        PAST MEDICAL & SURGICAL HISTORY:  Falls  Meningitis  Collapsed lung  Alcohol withdrawal  Emphysema of lung  ETOH abuse  Cirrhosis  Afib  CHF (congestive heart failure)  Poor historian  Alcohol abuse  Chronic atrial fibrillation  S/P BKA (below knee amputation), left: secondary to worsening infection in lower leg. Had both ankle injuries from fall s/p repair - left had complication.  S/P BKA (below knee amputation) unilateral, left      SOCIAL HISTORY  Smoking History:       REVIEW OF SYSTEMS:    unable to provide ROS    Vital Signs Last 24 Hrs  T(C): 36.8 (08 Dec 2017 07:28), Max: 37.2 (07 Dec 2017 14:00)  T(F): 98.2 (08 Dec 2017 07:28), Max: 99 (07 Dec 2017 14:00)  HR: 100 (08 Dec 2017 13:06) (64 - 100)  BP: 105/58 (08 Dec 2017 13:06) (92/52 - 115/59)  BP(mean): --  RR: 19 (08 Dec 2017 13:06) (15 - 19)  SpO2: 100% (08 Dec 2017 13:06) (96% - 100%)    PHYSICAL EXAMINATION:    GENERAL: The patient is awake and alert in no apparent distress.     HEENT: Head is normocephalic and atraumatic. Extraocular muscles are intact. Mucous membranes are moist.    NECK: Supple.trach    LUNGS: Clear to auscultation without wheezing, rales or rhonchi; respirations unlabored    HEART: Regular rate and rhythm without murmur.    ABDOMEN: Soft, nontender, and nondistended.      EXTREMITIES: Without any cyanosis, clubbing, rash, lesions or edema.    NEUROLOGIC: Grossly intact.    LABS:                        9.0    5.6   )-----------( 203      ( 07 Dec 2017 11:10 )             27.9     12-07    139  |  104  |  30.0<H>  ----------------------------<  98  4.3   |  25.0  |  0.69    Ca    8.7      07 Dec 2017 11:10                          MICROBIOLOGY:    RADIOLOGY & ADDITIONAL STUDIES:

## 2017-12-08 NOTE — PROGRESS NOTE ADULT - SUBJECTIVE AND OBJECTIVE BOX
STONE DIAZ     Chief Complaint: Patient is a 62y old  Male who presents with a chief complaint of Shortness of breath (30 Oct 2017 15:03)      PAST MEDICAL & SURGICAL HISTORY:  Falls  Meningitis  Collapsed lung  Alcohol withdrawal  Emphysema of lung  ETOH abuse  Cirrhosis  Afib  CHF (congestive heart failure)  Poor historian  Alcohol abuse  Chronic atrial fibrillation  S/P BKA (below knee amputation), left: secondary to worsening infection in lower leg. Had both ankle injuries from fall s/p repair - left had complication.  S/P BKA (below knee amputation) unilateral, left      HPI/OVERNIGHT EVENTS: Patient is climbing out of bed, threatening to kick the staff.    MEDICATIONS  (STANDING):  ALBUTerol/ipratropium for Nebulization 3 milliLiter(s) Nebulizer every 6 hours  chlorhexidine 0.12% Liquid 15 milliLiter(s) Swish and Spit two times a day  digoxin     Tablet 0.125 milliGRAM(s) Oral daily  diltiazem    Tablet 60 milliGRAM(s) Oral every 6 hours  enoxaparin Injectable 40 milliGRAM(s) SubCutaneous every 24 hours  folic acid 1 milliGRAM(s) Oral daily  gabapentin   Solution 300 milliGRAM(s) Oral every 8 hours  lactulose Syrup 10 Gram(s) Oral daily  lisinopril 5 milliGRAM(s) Oral daily  metoprolol     tartrate 25 milliGRAM(s) Oral every 8 hours  multivitamin 1 Tablet(s) Oral daily  OLANZapine 10 milliGRAM(s) Oral two times a day  pantoprazole   Suspension 40 milliGRAM(s) Oral daily  tamsulosin 0.4 milliGRAM(s) Oral at bedtime  thiamine 100 milliGRAM(s) Oral daily      Vital Signs Last 24 Hrs  T(C): 36.8 (08 Dec 2017 07:28), Max: 37.2 (07 Dec 2017 14:00)  T(F): 98.2 (08 Dec 2017 07:28), Max: 99 (07 Dec 2017 14:00)  HR: 91 (08 Dec 2017 07:28) (64 - 93)  BP: 92/52 (08 Dec 2017 07:28) (92/52 - 115/59)  BP(mean): --  RR: 19 (08 Dec 2017 07:28) (15 - 19)  SpO2: 100% (08 Dec 2017 09:15) (96% - 100%)    PHYSICAL EXAM:  Constitutional: NAD, well-groomed, well-developed  HEENT:  tracheostomy  Neck: No LAD, No JVD  Back: Normal spine flexure, No CVA tenderness  Respiratory: CTAB Cardiovascular: S1 and S2, RRR, no M/G/R  Gastrointestinal: BS+, soft, NT/ND  Extremities:  left bka  Vascular: 2+ peripheral pulses  Neurological:  awake, does not follow commands     CAPILLARY BLOOD GLUCOSE    LABS:                        9.0    5.6   )-----------( 203      ( 07 Dec 2017 11:10 )             27.9     12-07    139  |  104  |  30.0<H>  ----------------------------<  98  4.3   |  25.0  |  0.69    Ca    8.7      07 Dec 2017 11:10            RADIOLOGY & ADDITIONAL TESTS:

## 2017-12-08 NOTE — PROGRESS NOTE BEHAVIORAL HEALTH - NSBHCHARTREVIEWLAB_PSY_A_CORE FT
10.3   6.7   )-----------( 243      ( 06 Dec 2017 08:39 )             31.5     12-06    142  |  104  |  34.0<H>  ----------------------------<  90  4.4   |  26.0  |  0.83    Ca    9.1      06 Dec 2017 08:39  Phos  3.0     12-06  Mg     1.8     12-06
10.3   6.7   )-----------( 243      ( 06 Dec 2017 08:39 )             31.5     12-06    142  |  104  |  34.0<H>  ----------------------------<  90  4.4   |  26.0  |  0.83    Ca    9.1      06 Dec 2017 08:39  Phos  3.0     12-06  Mg     1.8     12-06
9.0    5.6   )-----------( 203      ( 07 Dec 2017 11:10 )             27.9     12-07    139  |  104  |  30.0<H>  ----------------------------<  98  4.3   |  25.0  |  0.69    Ca    8.7      07 Dec 2017 11:10

## 2017-12-08 NOTE — PROGRESS NOTE ADULT - PROBLEM SELECTOR PLAN 3
Maintain CO 1:1, on Olanzapine. Likely secondary to alcohol abuse.  12/4 Patient continues to climb out of bed. 12/5 Slightly better behaved on neurontin. 12/6 olanzapine increased. 12/7 Patient oversedated and xanax and zyprexa reduced. 12/8 Patient is agitated, zyprexa increased, xanax seems to be the agent that sedates him too much.

## 2017-12-08 NOTE — PROGRESS NOTE ADULT - ASSESSMENT
62 year old old male with atrial fibrillation, cirrhosis, CHF, alcohol abuse admitted with shortness of breath, admitted for COPD exacerbation, acute diastolic CHF and alcohol abuse, started on steroids, Librium protocol. Cardiology consulted. ECHO and stress test was ordered. He signed out AMA, returned to the hospital agitated, intoxicated. RRT was called later as he was in alcohol withdrawal, ICU was consulted, placed on Valium and Precedex, intubated for airway protection. He was given antibiotics for aspiration, he self extubated on 11/2, but was re intubated on 11/4 for airway protection and altered mental status. ECHO revealed cardiomyopathy. Mental status attributed to encephalopathy and alcohol withdrawal. EEG negative for seizures. He self extubated on 11/7, requiring reintubation. MRI brain unremarkable. He did not tolerate SBT given mental status and copious secretions. CT surgery was consulted for tracheostomy, which was performed on 11/10. Antibiotics changed to Aztreonam for pseudomonas pneumonia. He was transferred out of ICU, rate control medications adjusted for a fib with RVR.  He developed code sepsis on 11/13, he was readmitted to ICU for further management. GI consulted for PEG placement. Cardizem and Lopressor given for A fib with RVR. Medications started on encephalopathy. PEG placed on 11/22. He had episodes of fever, monitored off antibiotics. Digoxin was added for rate control. He was transferred out of medical ICU on 11/29. He developed hypotension on 11/30 after rate control medications were given. BP improved post fluids, heart rate improved.   12/2/2017 Patient continues to suffer from confusion requiring constatnt observation. On 12/3 He is more energetic, sitting up but continues to be confused, not following simple commands. 12/4 Patient is more alert today. He followed simple commands. On 12/5 he is more alert on neurontin but must remain under constant observation. On 12/6 psyche increased olanzapine. 12/7 Patient sedated reduce olanzapine to 5mg bid and discontinue xanax.  On 12/8 He is more agitated off xanax. I increased his zyprexa to 10 mg bid and avoid xanax as that seems to be oversedating.  Monitor hgb closely. assess occult blood in stool and anemia eval.

## 2017-12-09 LAB
ANION GAP SERPL CALC-SCNC: 13 MMOL/L — SIGNIFICANT CHANGE UP (ref 5–17)
BUN SERPL-MCNC: 22 MG/DL — HIGH (ref 8–20)
CALCIUM SERPL-MCNC: 9.2 MG/DL — SIGNIFICANT CHANGE UP (ref 8.6–10.2)
CHLORIDE SERPL-SCNC: 101 MMOL/L — SIGNIFICANT CHANGE UP (ref 98–107)
CO2 SERPL-SCNC: 25 MMOL/L — SIGNIFICANT CHANGE UP (ref 22–29)
CREAT SERPL-MCNC: 0.68 MG/DL — SIGNIFICANT CHANGE UP (ref 0.5–1.3)
FERRITIN SERPL-MCNC: 370.3 NG/ML — SIGNIFICANT CHANGE UP (ref 30–400)
FOLATE SERPL-MCNC: >20 NG/ML — HIGH (ref 4–16)
GLUCOSE SERPL-MCNC: 107 MG/DL — SIGNIFICANT CHANGE UP (ref 70–115)
HCT VFR BLD CALC: 32.7 % — LOW (ref 42–52)
HGB BLD-MCNC: 10.6 G/DL — LOW (ref 14–18)
IRON SATN MFR SERPL: 13 % — LOW (ref 16–55)
IRON SATN MFR SERPL: 33 UG/DL — LOW (ref 59–158)
MCHC RBC-ENTMCNC: 29.8 PG — SIGNIFICANT CHANGE UP (ref 27–31)
MCHC RBC-ENTMCNC: 32.4 G/DL — SIGNIFICANT CHANGE UP (ref 32–36)
MCV RBC AUTO: 91.9 FL — SIGNIFICANT CHANGE UP (ref 80–94)
PLATELET # BLD AUTO: 219 K/UL — SIGNIFICANT CHANGE UP (ref 150–400)
POTASSIUM SERPL-MCNC: 4.7 MMOL/L — SIGNIFICANT CHANGE UP (ref 3.5–5.3)
POTASSIUM SERPL-SCNC: 4.7 MMOL/L — SIGNIFICANT CHANGE UP (ref 3.5–5.3)
RBC # BLD: 3.56 M/UL — LOW (ref 4.6–6.2)
RBC # FLD: 15.6 % — SIGNIFICANT CHANGE UP (ref 11–15.6)
SODIUM SERPL-SCNC: 139 MMOL/L — SIGNIFICANT CHANGE UP (ref 135–145)
TIBC SERPL-MCNC: 260 UG/DL — SIGNIFICANT CHANGE UP (ref 220–430)
TRANSFERRIN SERPL-MCNC: 182 MG/DL — SIGNIFICANT CHANGE UP (ref 180–329)
VIT B12 SERPL-MCNC: 447 PG/ML — SIGNIFICANT CHANGE UP (ref 180–914)
WBC # BLD: 7.5 K/UL — SIGNIFICANT CHANGE UP (ref 4.8–10.8)
WBC # FLD AUTO: 7.5 K/UL — SIGNIFICANT CHANGE UP (ref 4.8–10.8)

## 2017-12-09 PROCEDURE — 99233 SBSQ HOSP IP/OBS HIGH 50: CPT

## 2017-12-09 RX ADMIN — LACTULOSE 10 GRAM(S): 10 SOLUTION ORAL at 12:15

## 2017-12-09 RX ADMIN — OLANZAPINE 10 MILLIGRAM(S): 15 TABLET, FILM COATED ORAL at 12:17

## 2017-12-09 RX ADMIN — OLANZAPINE 10 MILLIGRAM(S): 15 TABLET, FILM COATED ORAL at 05:16

## 2017-12-09 RX ADMIN — Medication 3 MILLILITER(S): at 08:45

## 2017-12-09 RX ADMIN — GABAPENTIN 300 MILLIGRAM(S): 400 CAPSULE ORAL at 12:16

## 2017-12-09 RX ADMIN — PANTOPRAZOLE SODIUM 40 MILLIGRAM(S): 20 TABLET, DELAYED RELEASE ORAL at 12:16

## 2017-12-09 RX ADMIN — Medication 3 MILLILITER(S): at 21:04

## 2017-12-09 RX ADMIN — LISINOPRIL 5 MILLIGRAM(S): 2.5 TABLET ORAL at 05:16

## 2017-12-09 RX ADMIN — CHLORHEXIDINE GLUCONATE 15 MILLILITER(S): 213 SOLUTION TOPICAL at 05:16

## 2017-12-09 RX ADMIN — Medication 1 MILLIGRAM(S): at 12:15

## 2017-12-09 RX ADMIN — ENOXAPARIN SODIUM 40 MILLIGRAM(S): 100 INJECTION SUBCUTANEOUS at 12:16

## 2017-12-09 RX ADMIN — Medication 25 MILLIGRAM(S): at 12:15

## 2017-12-09 RX ADMIN — Medication 3 MILLILITER(S): at 15:35

## 2017-12-09 RX ADMIN — Medication 25 MILLIGRAM(S): at 22:03

## 2017-12-09 RX ADMIN — GABAPENTIN 300 MILLIGRAM(S): 400 CAPSULE ORAL at 22:03

## 2017-12-09 RX ADMIN — Medication 0.12 MILLIGRAM(S): at 05:16

## 2017-12-09 RX ADMIN — GABAPENTIN 300 MILLIGRAM(S): 400 CAPSULE ORAL at 05:16

## 2017-12-09 RX ADMIN — Medication 1 TABLET(S): at 12:15

## 2017-12-09 RX ADMIN — Medication 100 MILLIGRAM(S): at 12:15

## 2017-12-09 RX ADMIN — Medication 25 MILLIGRAM(S): at 05:16

## 2017-12-09 RX ADMIN — Medication 3 MILLILITER(S): at 03:40

## 2017-12-09 RX ADMIN — TAMSULOSIN HYDROCHLORIDE 0.4 MILLIGRAM(S): 0.4 CAPSULE ORAL at 22:03

## 2017-12-09 NOTE — PROGRESS NOTE ADULT - PROBLEM SELECTOR PLAN 1
S/p trach collar, off antibiotics. Trach care, suctioning, doing well, Patient is tolerating trache collar constantly, will continue with humidified air

## 2017-12-09 NOTE — PROGRESS NOTE ADULT - SUBJECTIVE AND OBJECTIVE BOX
STONE DIAZ    [id number]    62y      Male    Patient is a 62y old  Male who presents with a chief complaint of Shortness of breath (30 Oct 2017 15:03)      INTERVAL HPI/OVERNIGHT EVENTS:    REVIEW OF SYSTEMS:    CONSTITUTIONAL: No fever, weight loss, or fatigue  RESPIRATORY: No cough, wheezing, hemoptysis; No shortness of breath  CARDIOVASCULAR: No chest pain, palpitations  GASTROINTESTINAL: No abdominal or epigastric pain. No nausea, vomiting  NEUROLOGICAL: No headaches,  loss of strength.  MISCELLANEOUS: No joint swelling or pain       Vital Signs Last 24 Hrs  T(C): 36.4 (09 Dec 2017 05:00), Max: 36.4 (09 Dec 2017 05:00)  T(F): 97.5 (09 Dec 2017 05:00), Max: 97.5 (09 Dec 2017 05:00)  HR: 86 (09 Dec 2017 12:12) (84 - 100)  BP: 100/56 (09 Dec 2017 12:12) (98/54 - 113/62)  BP(mean): --  RR: 20 (09 Dec 2017 08:00) (19 - 20)  SpO2: 100% (09 Dec 2017 08:45) (95% - 100%)    PHYSICAL EXAM:    GENERAL: Elderly male looking   HEENT: PERRL, +EOMI  NECK: soft, Supple, No JVD,   CHEST/LUNG: Clear to auscultate bilaterally; No wheezing  HEART: S1S2+, Regular rate and rhythm; No murmurs, rubs, or gallops  ABDOMEN: Soft, Nontender, Nondistended; Bowel sounds present  EXTREMITIES:  2+ Peripheral Pulses, No clubbing, cyanosis, or edema  SKIN: No rashes or lesions  NEURO: AAOX3, no focal deficits, no motor r sensory loss  PSYCH: normal mood          I&O's Summary    08 Dec 2017 07:01  -  09 Dec 2017 07:00  --------------------------------------------------------  IN: 2540 mL / OUT: 0 mL / NET: 2540 mL        MEDICATIONS  (STANDING):  ALBUTerol/ipratropium for Nebulization 3 milliLiter(s) Nebulizer every 6 hours  chlorhexidine 0.12% Liquid 15 milliLiter(s) Swish and Spit two times a day  digoxin     Tablet 0.125 milliGRAM(s) Oral daily  diltiazem    Tablet 60 milliGRAM(s) Oral every 6 hours  enoxaparin Injectable 40 milliGRAM(s) SubCutaneous every 24 hours  folic acid 1 milliGRAM(s) Oral daily  gabapentin   Solution 300 milliGRAM(s) Oral every 8 hours  lactulose Syrup 10 Gram(s) Oral daily  lisinopril 5 milliGRAM(s) Oral daily  metoprolol     tartrate 25 milliGRAM(s) Oral every 8 hours  multivitamin 1 Tablet(s) Oral daily  OLANZapine 10 milliGRAM(s) Oral two times a day  pantoprazole   Suspension 40 milliGRAM(s) Oral daily  tamsulosin 0.4 milliGRAM(s) Oral at bedtime  thiamine 100 milliGRAM(s) Oral daily    MEDICATIONS  (PRN):  acetaminophen    Suspension 650 milliGRAM(s) Oral every 6 hours PRN For Temp greater than 38 C (100.4 F)  bisacodyl Suppository 10 milliGRAM(s) Rectal daily PRN Constipation  haloperidol    Injectable 2 milliGRAM(s) IntraMuscular every 6 hours PRN For agitation  metoprolol    tartrate Injectable 5 milliGRAM(s) IV Push every 6 hours PRN HR > 120 STONE DIAZ    [id number]    62y      Male    Patient is a 62y old  Male who presents with a chief complaint of Shortness of breath (30 Oct 2017 15:03)      INTERVAL HPI/OVERNIGHT EVENTS:    patient is doing ok, feeling better, has no complains as per nursing staff, patient has no agitation    REVIEW OF SYSTEMS:    limited as patient is little bit sleepy    Vital Signs Last 24 Hrs  T(C): 36.4 (09 Dec 2017 05:00), Max: 36.4 (09 Dec 2017 05:00)  T(F): 97.5 (09 Dec 2017 05:00), Max: 97.5 (09 Dec 2017 05:00)  HR: 86 (09 Dec 2017 12:12) (84 - 100)  BP: 100/56 (09 Dec 2017 12:12) (98/54 - 113/62)    RR: 20 (09 Dec 2017 08:00) (19 - 20)  SpO2: 100% (09 Dec 2017 08:45) (95% - 100%)    PHYSICAL EXAM:    GENERAL: middle age male looking comfortable  HEENT: PERRL, +EOMI  NECK: tach in place   CHEST/LUNG: referred soundsbilaterally; No wheezing  HEART: S1S2+, Regular rate and rhythm; No murmurs  ABDOMEN: Soft, Nontender, Nondistended; Bowel sounds present  EXTREMITIES:  1+ Peripheral Pulses, No edema  SKIN: No rashes or lesions  NEURO: Drowsy        I&O's Summary    08 Dec 2017 07:01  -  09 Dec 2017 07:00  --------------------------------------------------------  IN: 2540 mL / OUT: 0 mL / NET: 2540 mL        MEDICATIONS  (STANDING):  ALBUTerol/ipratropium for Nebulization 3 milliLiter(s) Nebulizer every 6 hours  chlorhexidine 0.12% Liquid 15 milliLiter(s) Swish and Spit two times a day  digoxin     Tablet 0.125 milliGRAM(s) Oral daily  diltiazem    Tablet 60 milliGRAM(s) Oral every 6 hours  enoxaparin Injectable 40 milliGRAM(s) SubCutaneous every 24 hours  folic acid 1 milliGRAM(s) Oral daily  gabapentin   Solution 300 milliGRAM(s) Oral every 8 hours  lactulose Syrup 10 Gram(s) Oral daily  lisinopril 5 milliGRAM(s) Oral daily  metoprolol     tartrate 25 milliGRAM(s) Oral every 8 hours  multivitamin 1 Tablet(s) Oral daily  OLANZapine 10 milliGRAM(s) Oral two times a day  pantoprazole   Suspension 40 milliGRAM(s) Oral daily  tamsulosin 0.4 milliGRAM(s) Oral at bedtime  thiamine 100 milliGRAM(s) Oral daily    MEDICATIONS  (PRN):  acetaminophen    Suspension 650 milliGRAM(s) Oral every 6 hours PRN For Temp greater than 38 C (100.4 F)  bisacodyl Suppository 10 milliGRAM(s) Rectal daily PRN Constipation  haloperidol    Injectable 2 milliGRAM(s) IntraMuscular every 6 hours PRN For agitation  metoprolol    tartrate Injectable 5 milliGRAM(s) IV Push every 6 hours PRN HR > 120

## 2017-12-09 NOTE — PROGRESS NOTE ADULT - PROBLEM SELECTOR PLAN 3
Maintain CO 1:1, on Olanzapine. Likely secondary to alcohol abuse.  12/4 Patient continues to climb out of bed. 12/5 Slightly better behaved on neurontin. 12/6 olanzapine increased. 12/7 Patient oversedated and xanax and zyprexa reduced. 12/8 Patient is agitated, zyprexa increased, xanax seems to be the agent that sedates him too much, looks stable, will continue to follow Psyhc team

## 2017-12-09 NOTE — CHART NOTE - NSCHARTNOTEFT_GEN_A_CORE
Source: Patient [ ]  Family []   other [ X]RN    Current Diet:  NPO, recommend bolus feedings of Jevity 1.5: 240ml every 4 hours ( 6 times/day) Providing 1440kcal, 2130kcal, 91g pro if 100% infused) which meets complete nutrient needs.       Patient reports [ ] nausea  [ ] vomiting [ ] diarrhea [ ] constipation  [ ]chewing problems [ X] swallowing issues  [ ] other:  poor appetite      Pertinent Medications: MEDICATIONS  (STANDING):  ALBUTerol/ipratropium for Nebulization 3 milliLiter(s) Nebulizer every 6 hours  chlorhexidine 0.12% Liquid 15 milliLiter(s) Swish and Spit two times a day  digoxin     Tablet 0.125 milliGRAM(s) Oral daily  diltiazem    Tablet 60 milliGRAM(s) Oral every 6 hours  enoxaparin Injectable 40 milliGRAM(s) SubCutaneous every 24 hours  folic acid 1 milliGRAM(s) Oral daily  gabapentin   Solution 300 milliGRAM(s) Oral every 8 hours  lactulose Syrup 10 Gram(s) Oral daily  lisinopril 5 milliGRAM(s) Oral daily  metoprolol     tartrate 25 milliGRAM(s) Oral every 8 hours  multivitamin 1 Tablet(s) Oral daily  OLANZapine 10 milliGRAM(s) Oral two times a day  pantoprazole   Suspension 40 milliGRAM(s) Oral daily  tamsulosin 0.4 milliGRAM(s) Oral at bedtime  thiamine 100 milliGRAM(s) Oral daily    MEDICATIONS  (PRN):  acetaminophen    Suspension 650 milliGRAM(s) Oral every 6 hours PRN For Temp greater than 38 C (100.4 F)  bisacodyl Suppository 10 milliGRAM(s) Rectal daily PRN Constipation  haloperidol    Injectable 2 milliGRAM(s) IntraMuscular every 6 hours PRN For agitation  metoprolol    tartrate Injectable 5 milliGRAM(s) IV Push every 6 hours PRN HR > 120   lopressor, senna, metformin, colace, Vitamin C, MVI, humalog, lantus    Labs:  (12/9)- Labs WNL except low H/H 10.6/32.7, elevated BUN 22, low Fe 33      Estimated Needs:   [ X] no change since previous assessment  [ ] recalculated:     Current Nutrition Diagnosis: Difficulty swallowing related to dysphagia as evidenced by NPO status and receiving bolus enteral feeds.    Recommendations: Bolus feedings of Jevity 1.5: 240ml every 4 hours ( 6 times/day) Providing 1440kcal, 2130kcal, 91g pro if 100% infused) which meets complete nutrient needs.       Monitoring and Evaluation:   [X ] Tolerance to TF regimen [X] Weights  [X] Follow up per protocol [X] Labs:

## 2017-12-10 PROCEDURE — 99232 SBSQ HOSP IP/OBS MODERATE 35: CPT

## 2017-12-10 RX ADMIN — Medication 1 TABLET(S): at 11:14

## 2017-12-10 RX ADMIN — GABAPENTIN 300 MILLIGRAM(S): 400 CAPSULE ORAL at 05:00

## 2017-12-10 RX ADMIN — CHLORHEXIDINE GLUCONATE 15 MILLILITER(S): 213 SOLUTION TOPICAL at 04:58

## 2017-12-10 RX ADMIN — ENOXAPARIN SODIUM 40 MILLIGRAM(S): 100 INJECTION SUBCUTANEOUS at 11:14

## 2017-12-10 RX ADMIN — Medication 1 MILLIGRAM(S): at 11:14

## 2017-12-10 RX ADMIN — Medication 3 MILLILITER(S): at 03:00

## 2017-12-10 RX ADMIN — Medication 100 MILLIGRAM(S): at 11:15

## 2017-12-10 RX ADMIN — Medication 25 MILLIGRAM(S): at 04:58

## 2017-12-10 RX ADMIN — GABAPENTIN 300 MILLIGRAM(S): 400 CAPSULE ORAL at 21:49

## 2017-12-10 RX ADMIN — OLANZAPINE 10 MILLIGRAM(S): 15 TABLET, FILM COATED ORAL at 04:58

## 2017-12-10 RX ADMIN — OLANZAPINE 10 MILLIGRAM(S): 15 TABLET, FILM COATED ORAL at 16:56

## 2017-12-10 RX ADMIN — Medication 3 MILLILITER(S): at 08:24

## 2017-12-10 RX ADMIN — PANTOPRAZOLE SODIUM 40 MILLIGRAM(S): 20 TABLET, DELAYED RELEASE ORAL at 11:14

## 2017-12-10 RX ADMIN — GABAPENTIN 300 MILLIGRAM(S): 400 CAPSULE ORAL at 11:15

## 2017-12-10 RX ADMIN — Medication 3 MILLILITER(S): at 21:07

## 2017-12-10 RX ADMIN — CHLORHEXIDINE GLUCONATE 15 MILLILITER(S): 213 SOLUTION TOPICAL at 15:47

## 2017-12-10 RX ADMIN — LACTULOSE 10 GRAM(S): 10 SOLUTION ORAL at 11:14

## 2017-12-10 RX ADMIN — Medication 0.12 MILLIGRAM(S): at 04:58

## 2017-12-10 RX ADMIN — Medication 3 MILLILITER(S): at 15:39

## 2017-12-10 RX ADMIN — TAMSULOSIN HYDROCHLORIDE 0.4 MILLIGRAM(S): 0.4 CAPSULE ORAL at 21:47

## 2017-12-10 NOTE — PROGRESS NOTE ADULT - SUBJECTIVE AND OBJECTIVE BOX
STONE DIAZ    [id number]    62y      Male    Patient is a 62y old  Male who presents with a chief complaint of Shortness of breath (30 Oct 2017 15:03)      INTERVAL HPI/OVERNIGHT EVENTS:  patient is doing ok, feeling better, able to answer questions appropriately, he denies having chest pain, fever, pain any where.      REVIEW OF SYSTEMS:    CONSTITUTIONAL: No fever, some fatigue  RESPIRATORY: No cough, shortness of breath is better  CARDIOVASCULAR: No chest pain, palpitations  GASTROINTESTINAL: No abdominal.   NEUROLOGICAL: No headaches  MISCELLANEOUS: No joint swelling or pain       Vital Signs Last 24 Hrs  T(C): 36.8 (10 Dec 2017 09:41), Max: 37.2 (09 Dec 2017 17:42)  T(F): 98.3 (10 Dec 2017 09:41), Max: 99 (09 Dec 2017 17:42)  HR: 99 (10 Dec 2017 09:44) (64 - 99)  BP: 85/56 (10 Dec 2017 09:44) (80/51 - 113/63)  RR: 18 (10 Dec 2017 09:41) (18 - 20)  SpO2: 95% (10 Dec 2017 09:41) (95% - 100%)    PHYSICAL EXAM:    GENERAL: middle age male looking comfortable  HEENT: PERRL, +EOMI  NECK: tach in place   CHEST/LUNG: referred sounds bilaterally; No wheezing  HEART: S1S2+, Regular rate and rhythm; No murmurs  ABDOMEN: Soft, Nontender, Nondistended; Bowel sounds present  EXTREMITIES:  1+ Peripheral Pulses, No edema, left BKA  SKIN: No rashes or lesions  NEURO: AAOX3, no focal deficits.   PSYCH: Pleasant mood      LABS:                        10.6   7.5   )-----------( 219      ( 09 Dec 2017 11:39 )             32.7     12-09    139  |  101  |  22.0<H>  ----------------------------<  107  4.7   |  25.0  |  0.68    Ca    9.2      09 Dec 2017 11:39              I&O's Summary    09 Dec 2017 07:01  -  10 Dec 2017 07:00  --------------------------------------------------------  IN: 1640 mL / OUT: 0 mL / NET: 1640 mL        MEDICATIONS  (STANDING):  ALBUTerol/ipratropium for Nebulization 3 milliLiter(s) Nebulizer every 6 hours  chlorhexidine 0.12% Liquid 15 milliLiter(s) Swish and Spit two times a day  digoxin     Tablet 0.125 milliGRAM(s) Oral daily  diltiazem    Tablet 60 milliGRAM(s) Oral every 6 hours  enoxaparin Injectable 40 milliGRAM(s) SubCutaneous every 24 hours  folic acid 1 milliGRAM(s) Oral daily  gabapentin   Solution 300 milliGRAM(s) Oral every 8 hours  lactulose Syrup 10 Gram(s) Oral daily  lisinopril 5 milliGRAM(s) Oral daily  metoprolol     tartrate 25 milliGRAM(s) Oral every 8 hours  multivitamin 1 Tablet(s) Oral daily  OLANZapine 10 milliGRAM(s) Oral two times a day  pantoprazole   Suspension 40 milliGRAM(s) Oral daily  tamsulosin 0.4 milliGRAM(s) Oral at bedtime  thiamine 100 milliGRAM(s) Oral daily    MEDICATIONS  (PRN):  acetaminophen    Suspension 650 milliGRAM(s) Oral every 6 hours PRN For Temp greater than 38 C (100.4 F)  bisacodyl Suppository 10 milliGRAM(s) Rectal daily PRN Constipation  haloperidol    Injectable 2 milliGRAM(s) IntraMuscular every 6 hours PRN For agitation  metoprolol    tartrate Injectable 5 milliGRAM(s) IV Push every 6 hours PRN HR > 120

## 2017-12-10 NOTE — PROGRESS NOTE ADULT - PROBLEM SELECTOR PLAN 3
Maintain CO 1:1, on Olanzapine. Likely secondary to alcohol abuse.  12/4 Patient continues to climb out of bed. 12/5 Slightly better behaved on neurontin. 12/6 olanzapine increased. 12/7 Patient oversedated and xanax and zyprexa reduced. 12/8 Patient is agitated, zyprexa increased, xanax seems to be the agent that sedates him too much, no more on Xanax looks stable, will continue to follow Psyhc team

## 2017-12-10 NOTE — PROGRESS NOTE ADULT - PROBLEM SELECTOR PLAN 1
S/p trach collar, off antibiotics, Trach care, suctioning, doing well, Patient is tolerating trache collar constantly, will continue with humidified air

## 2017-12-11 PROCEDURE — 99232 SBSQ HOSP IP/OBS MODERATE 35: CPT

## 2017-12-11 RX ADMIN — Medication 3 MILLILITER(S): at 03:49

## 2017-12-11 RX ADMIN — Medication 25 MILLIGRAM(S): at 21:27

## 2017-12-11 RX ADMIN — OLANZAPINE 10 MILLIGRAM(S): 15 TABLET, FILM COATED ORAL at 17:45

## 2017-12-11 RX ADMIN — GABAPENTIN 300 MILLIGRAM(S): 400 CAPSULE ORAL at 21:27

## 2017-12-11 RX ADMIN — CHLORHEXIDINE GLUCONATE 15 MILLILITER(S): 213 SOLUTION TOPICAL at 11:10

## 2017-12-11 RX ADMIN — OLANZAPINE 10 MILLIGRAM(S): 15 TABLET, FILM COATED ORAL at 05:02

## 2017-12-11 RX ADMIN — Medication 1 MILLIGRAM(S): at 11:09

## 2017-12-11 RX ADMIN — LACTULOSE 10 GRAM(S): 10 SOLUTION ORAL at 11:09

## 2017-12-11 RX ADMIN — Medication 100 MILLIGRAM(S): at 11:09

## 2017-12-11 RX ADMIN — PANTOPRAZOLE SODIUM 40 MILLIGRAM(S): 20 TABLET, DELAYED RELEASE ORAL at 11:10

## 2017-12-11 RX ADMIN — GABAPENTIN 300 MILLIGRAM(S): 400 CAPSULE ORAL at 05:03

## 2017-12-11 RX ADMIN — TAMSULOSIN HYDROCHLORIDE 0.4 MILLIGRAM(S): 0.4 CAPSULE ORAL at 21:27

## 2017-12-11 RX ADMIN — Medication 3 MILLILITER(S): at 20:29

## 2017-12-11 RX ADMIN — Medication 3 MILLILITER(S): at 15:14

## 2017-12-11 RX ADMIN — Medication 1 TABLET(S): at 11:09

## 2017-12-11 RX ADMIN — CHLORHEXIDINE GLUCONATE 15 MILLILITER(S): 213 SOLUTION TOPICAL at 05:02

## 2017-12-11 RX ADMIN — GABAPENTIN 300 MILLIGRAM(S): 400 CAPSULE ORAL at 11:11

## 2017-12-11 RX ADMIN — Medication 0.12 MILLIGRAM(S): at 05:03

## 2017-12-11 RX ADMIN — Medication 3 MILLILITER(S): at 08:19

## 2017-12-11 RX ADMIN — ENOXAPARIN SODIUM 40 MILLIGRAM(S): 100 INJECTION SUBCUTANEOUS at 11:10

## 2017-12-11 NOTE — PROGRESS NOTE ADULT - SUBJECTIVE AND OBJECTIVE BOX
HPI:  62 year old old male with atrial fibrillation, cirrhosis, CHF, alcohol abuse admitted with shortness of breath, admitted for COPD exacerbation, acute diastolic CHF and alcohol abuse, started on steroids, Librium protocol. He signed out AMA, returned to the hospital agitated, intoxicated. RRT was called later as he was in alcohol withdrawal, ICU stay.intubated on 11/4 for airway protection and altered mental status.  Mental status attributed to encephalopathy and alcohol withdrawal. EEG negative for seizures. He self extubated on 11/7, requiring reintubation. tracheostomy 11/10. Code sepsis on 11/13, readmitted to ICU. PEG   placed on 11/22.  Seen by Psych for agitation, needing medication adjustment. Now off xanax    INTERVAL SUBJECTIVE & REVIEW OF SYMPTOMS:  Chart reviewed. Patient seen at bedside. Medications for behaviour management still being adjusted.       REVIEW OF SYSTEMS:   Limited verbalisation due to trach  Denies HA/CP/abdominal pain/nausea  Still NPO      VITAL SIGNS  Vital Signs Last 24 Hrs  T(C): 36.3 (11 Dec 2017 07:54), Max: 37.3 (11 Dec 2017 04:34)  T(F): 97.4 (11 Dec 2017 07:54), Max: 99.2 (11 Dec 2017 04:34)  HR: 69 (11 Dec 2017 07:54) (69 - 93)  BP: 103/63 (11 Dec 2017 07:54) (80/59 - 116/66)  BP(mean): --  RR: 19 (11 Dec 2017 07:54) (18 - 19)  SpO2: 99% (11 Dec 2017 04:34) (98% - 100%)    PHYSICAL EXAM  General: Calm, pleasant and cooperative with exam, still has # 8 cuffed trach, cuff deflated. No secretions noted and patient able to phonate with trach occlusion. Patient on 1:1   Cardio: S1S2+  Resp: clear  Abdomen: soft, PEG site with old dried blood  Neuro: alert, awake, oriented with cues, motor 4-5/5  Extrem: no edema, left BKA     Functional Exam: Transfers at supervision/ ambulation with PT with prosthesis      MEDICATIONS  (STANDING):  ALBUTerol/ipratropium for Nebulization 3 milliLiter(s) Nebulizer every 6 hours  chlorhexidine 0.12% Liquid 15 milliLiter(s) Swish and Spit two times a day  digoxin     Tablet 0.125 milliGRAM(s) Oral daily  diltiazem    Tablet 60 milliGRAM(s) Oral every 6 hours  enoxaparin Injectable 40 milliGRAM(s) SubCutaneous every 24 hours  folic acid 1 milliGRAM(s) Oral daily  gabapentin   Solution 300 milliGRAM(s) Oral every 8 hours  lactulose Syrup 10 Gram(s) Oral daily  lisinopril 5 milliGRAM(s) Oral daily  metoprolol     tartrate 25 milliGRAM(s) Oral every 8 hours  multivitamin 1 Tablet(s) Oral daily  OLANZapine 10 milliGRAM(s) Oral two times a day  pantoprazole   Suspension 40 milliGRAM(s) Oral daily  tamsulosin 0.4 milliGRAM(s) Oral at bedtime  thiamine 100 milliGRAM(s) Oral daily    MEDICATIONS  (PRN):  acetaminophen    Suspension 650 milliGRAM(s) Oral every 6 hours PRN For Temp greater than 38 C (100.4 F)  bisacodyl Suppository 10 milliGRAM(s) Rectal daily PRN Constipation  haloperidol    Injectable 2 milliGRAM(s) IntraMuscular every 6 hours PRN For agitation  metoprolol    tartrate Injectable 5 milliGRAM(s) IV Push every 6 hours PRN HR > 120      RECENT LABS:                        10.6   7.5   )-----------( 219      ( 09 Dec 2017 11:39 )             32.7     12-09    139  |  101  |  22.0<H>  ----------------------------<  107  4.7   |  25.0  |  0.68    Ca    9.2      09 Dec 2017 11:39    A/P:    62 year old male with prolonged complex hospital course with COPD, alcohol withdrawal, encephalopathy, cardiomyopathy, afib, Trach PEG,   Hx of prior left BKA -   Patient on 1:1, appears calm, and cooperative with exam    1. Recommend acute rehab, -PT/OT/ST for impairments due to above- Gait is improving. But continued impairment with ADLs, NPO status at this time, with bolus feeds via PEG.  cognitive deficits,  on 1:1 supervision.    2. Recommend trach change to non -cuffed  and trial of Passey [place] valve- d/w Dr. Maloney. This will also assist with phonation and swallow evaluation.    3. As per case management, patient denied acute rehab. Will do peer to peer review. called [phone] ext [phone]    Thank you HPI:  62 year old old male with atrial fibrillation, cirrhosis, CHF, alcohol abuse admitted with shortness of breath, admitted for COPD exacerbation, acute diastolic CHF and alcohol abuse, started on steroids, Librium protocol. He signed out AMA, returned to the hospital agitated, intoxicated. RRT was called later as he was in alcohol withdrawal, ICU stay.intubated on 11/4 for airway protection and altered mental status.  Mental status attributed to encephalopathy and alcohol withdrawal. EEG negative for seizures. He self extubated on 11/7, requiring reintubation. tracheostomy 11/10. Code sepsis on 11/13, readmitted to ICU. PEG   placed on 11/22.  Seen by Psych for agitation, needing medication adjustment. Now off xanax    INTERVAL SUBJECTIVE & REVIEW OF SYMPTOMS:  Chart reviewed. Patient seen at bedside. Medications for behaviour management still being adjusted.       REVIEW OF SYSTEMS:   Limited verbalisation due to trach  Denies HA/CP/abdominal pain/nausea  Still NPO      VITAL SIGNS  Vital Signs Last 24 Hrs  T(C): 36.3 (11 Dec 2017 07:54), Max: 37.3 (11 Dec 2017 04:34)  T(F): 97.4 (11 Dec 2017 07:54), Max: 99.2 (11 Dec 2017 04:34)  HR: 69 (11 Dec 2017 07:54) (69 - 93)  BP: 103/63 (11 Dec 2017 07:54) (80/59 - 116/66)  BP(mean): --  RR: 19 (11 Dec 2017 07:54) (18 - 19)  SpO2: 99% (11 Dec 2017 04:34) (98% - 100%)    PHYSICAL EXAM  General: Calm, pleasant and cooperative with exam, still has # 8 cuffed trach, cuff deflated. No secretions noted and patient able to phonate with trach occlusion. Patient on 1:1   Cardio: S1S2+  Resp: clear  Abdomen: soft, PEG site with old dried blood  Neuro: alert, awake, oriented with cues, motor 4-5/5  Extrem: no edema, left BKA     Functional Exam: Transfers at supervision/ ambulation with PT with prosthesis      MEDICATIONS  (STANDING):  ALBUTerol/ipratropium for Nebulization 3 milliLiter(s) Nebulizer every 6 hours  chlorhexidine 0.12% Liquid 15 milliLiter(s) Swish and Spit two times a day  digoxin     Tablet 0.125 milliGRAM(s) Oral daily  diltiazem    Tablet 60 milliGRAM(s) Oral every 6 hours  enoxaparin Injectable 40 milliGRAM(s) SubCutaneous every 24 hours  folic acid 1 milliGRAM(s) Oral daily  gabapentin   Solution 300 milliGRAM(s) Oral every 8 hours  lactulose Syrup 10 Gram(s) Oral daily  lisinopril 5 milliGRAM(s) Oral daily  metoprolol     tartrate 25 milliGRAM(s) Oral every 8 hours  multivitamin 1 Tablet(s) Oral daily  OLANZapine 10 milliGRAM(s) Oral two times a day  pantoprazole   Suspension 40 milliGRAM(s) Oral daily  tamsulosin 0.4 milliGRAM(s) Oral at bedtime  thiamine 100 milliGRAM(s) Oral daily    MEDICATIONS  (PRN):  acetaminophen    Suspension 650 milliGRAM(s) Oral every 6 hours PRN For Temp greater than 38 C (100.4 F)  bisacodyl Suppository 10 milliGRAM(s) Rectal daily PRN Constipation  haloperidol    Injectable 2 milliGRAM(s) IntraMuscular every 6 hours PRN For agitation  metoprolol    tartrate Injectable 5 milliGRAM(s) IV Push every 6 hours PRN HR > 120      RECENT LABS:                        10.6   7.5   )-----------( 219      ( 09 Dec 2017 11:39 )             32.7     12-09    139  |  101  |  22.0<H>  ----------------------------<  107  4.7   |  25.0  |  0.68    Ca    9.2      09 Dec 2017 11:39    A/P:    62 year old male with prolonged complex hospital course with alcohol withdrawal, encephalopathy, cardiomyopathy, afib in intermittent RVR, Trach,  PEG,, episodes of agitation.    Hx of prior left BKA -   Patient on 1:1, appears calm, and cooperative with exam    1. Recommend acute rehab, -PT/OT/ST for impairments due to above- Gait is improving. But continued impairment with ADLs, NPO status at this time, with bolus feeds via PEG.  cognitive deficits,  on 1:1 supervision.    2. Recommend trach change to non -cuffed  and trial of Passey [place] valve- d/w Dr. Maloney. This will also assist with phonation and swallow evaluation.    3. As per case management, patient denied acute rehab. Will do peer to peer review. called [phone] ext [phone]    Thank you

## 2017-12-11 NOTE — CHART NOTE - NSCHARTNOTEFT_GEN_A_CORE
8 cuffed shiley changed to 6 uncuffed shiley at bedside without complication  Small amount of stomal irritation and bleed post insertion.    pt tolerated well

## 2017-12-11 NOTE — PROGRESS NOTE ADULT - SUBJECTIVE AND OBJECTIVE BOX
unable to speak or write alert mouthing words asking by gestures to eat  aide reports impulsivity and low frustration tolerance unable to obtain ROS  MSE restless seated in chair appears anxious perplexed no abnormal movements  Vital Signs Last 24 Hrs  T(C): 36.3 (11 Dec 2017 07:54), Max: 37.3 (11 Dec 2017 04:34)  T(F): 97.4 (11 Dec 2017 07:54), Max: 99.2 (11 Dec 2017 04:34)  HR: 77 (11 Dec 2017 11:11) (69 - 93)  BP: 102/60 (11 Dec 2017 11:11) (80/59 - 116/66)  BP(mean): --  RR: 19 (11 Dec 2017 11:11) (18 - 19)  SpO2: 100% (11 Dec 2017 13:32) (98% - 100%)

## 2017-12-11 NOTE — PROGRESS NOTE ADULT - SUBJECTIVE AND OBJECTIVE BOX
STONE DIAZ    [id number]    62y      Male    Patient is a 62y old  Male who presents with a chief complaint of Shortness of breath (30 Oct 2017 15:03)      INTERVAL HPI/OVERNIGHT EVENTS:    patient is doing ok, able to answer questions appropriately, he denies having chest pain, fever, pain any where.      REVIEW OF SYSTEMS:    CONSTITUTIONAL: No fever, some fatigue  RESPIRATORY: No cough, shortness of breath is better  CARDIOVASCULAR: No chest pain, palpitations  GASTROINTESTINAL: No abdominal.   NEUROLOGICAL: No headaches  MISCELLANEOUS: No joint swelling or pain       Vital Signs Last 24 Hrs  T(C): 36.3 (11 Dec 2017 07:54), Max: 37.3 (11 Dec 2017 04:34)  T(F): 97.4 (11 Dec 2017 07:54), Max: 99.2 (11 Dec 2017 04:34)  HR: 90 (11 Dec 2017 15:15) (69 - 93)  BP: 102/60 (11 Dec 2017 11:11) (97/67 - 116/66)  RR: 19 (11 Dec 2017 11:11) (18 - 19)  SpO2: 98% (11 Dec 2017 15:15) (98% - 100%)    PHYSICAL EXAM:    GENERAL: middle age male looking comfortable  HEENT: PERRL, +EOMI  NECK: tach in place   CHEST/LUNG: referred sounds bilaterally; No wheezing  HEART: S1S2+, Regular rate and rhythm; No murmurs  ABDOMEN: Soft, Nontender, Nondistended; Bowel sounds present  EXTREMITIES:  1+ Peripheral Pulses, No edema, left BKA  SKIN: No rashes or lesions  NEURO: AAOX3, no focal deficits.   PSYCH: Pleasant mood      10 Dec 2017 07:01  -  11 Dec 2017 07:00  --------------------------------------------------------  IN: 2600 mL / OUT: 0 mL / NET: 2600 mL        MEDICATIONS  (STANDING):  ALBUTerol/ipratropium for Nebulization 3 milliLiter(s) Nebulizer every 6 hours  chlorhexidine 0.12% Liquid 15 milliLiter(s) Swish and Spit two times a day  digoxin     Tablet 0.125 milliGRAM(s) Oral daily  diltiazem    Tablet 60 milliGRAM(s) Oral every 6 hours  enoxaparin Injectable 40 milliGRAM(s) SubCutaneous every 24 hours  folic acid 1 milliGRAM(s) Oral daily  gabapentin   Solution 300 milliGRAM(s) Oral every 8 hours  lactulose Syrup 10 Gram(s) Oral daily  lisinopril 5 milliGRAM(s) Oral daily  metoprolol     tartrate 25 milliGRAM(s) Oral every 8 hours  multivitamin 1 Tablet(s) Oral daily  OLANZapine 10 milliGRAM(s) Oral two times a day  pantoprazole   Suspension 40 milliGRAM(s) Oral daily  tamsulosin 0.4 milliGRAM(s) Oral at bedtime  thiamine 100 milliGRAM(s) Oral daily    MEDICATIONS  (PRN):  acetaminophen    Suspension 650 milliGRAM(s) Oral every 6 hours PRN For Temp greater than 38 C (100.4 F)  bisacodyl Suppository 10 milliGRAM(s) Rectal daily PRN Constipation  haloperidol    Injectable 2 milliGRAM(s) IntraMuscular every 6 hours PRN For agitation  metoprolol    tartrate Injectable 5 milliGRAM(s) IV Push every 6 hours PRN HR > 120

## 2017-12-11 NOTE — PROGRESS NOTE ADULT - PROBLEM SELECTOR PLAN 1
S/p trach collar, off antibiotics, Trach care, suctioning, Trach has been changed to 6mm Shilley, uncuffed, that will allow us to do swallow eval as well as phonation, he has not been on Vent,  has  will continue with humidified air.

## 2017-12-12 ENCOUNTER — RX RENEWAL (OUTPATIENT)
Age: 62
End: 2017-12-12

## 2017-12-12 PROCEDURE — 99232 SBSQ HOSP IP/OBS MODERATE 35: CPT

## 2017-12-12 RX ADMIN — Medication 1 MILLIGRAM(S): at 13:18

## 2017-12-12 RX ADMIN — Medication 100 MILLIGRAM(S): at 13:18

## 2017-12-12 RX ADMIN — CHLORHEXIDINE GLUCONATE 15 MILLILITER(S): 213 SOLUTION TOPICAL at 16:41

## 2017-12-12 RX ADMIN — Medication 0.12 MILLIGRAM(S): at 05:38

## 2017-12-12 RX ADMIN — Medication 3 MILLILITER(S): at 20:32

## 2017-12-12 RX ADMIN — Medication 3 MILLILITER(S): at 03:15

## 2017-12-12 RX ADMIN — GABAPENTIN 300 MILLIGRAM(S): 400 CAPSULE ORAL at 16:41

## 2017-12-12 RX ADMIN — LACTULOSE 10 GRAM(S): 10 SOLUTION ORAL at 13:18

## 2017-12-12 RX ADMIN — GABAPENTIN 300 MILLIGRAM(S): 400 CAPSULE ORAL at 05:38

## 2017-12-12 RX ADMIN — ENOXAPARIN SODIUM 40 MILLIGRAM(S): 100 INJECTION SUBCUTANEOUS at 12:18

## 2017-12-12 RX ADMIN — Medication 3 MILLILITER(S): at 08:04

## 2017-12-12 RX ADMIN — Medication 1 TABLET(S): at 13:18

## 2017-12-12 RX ADMIN — OLANZAPINE 10 MILLIGRAM(S): 15 TABLET, FILM COATED ORAL at 05:38

## 2017-12-12 RX ADMIN — Medication 3 MILLILITER(S): at 15:10

## 2017-12-12 RX ADMIN — TAMSULOSIN HYDROCHLORIDE 0.4 MILLIGRAM(S): 0.4 CAPSULE ORAL at 21:34

## 2017-12-12 RX ADMIN — PANTOPRAZOLE SODIUM 40 MILLIGRAM(S): 20 TABLET, DELAYED RELEASE ORAL at 13:18

## 2017-12-12 RX ADMIN — CHLORHEXIDINE GLUCONATE 15 MILLILITER(S): 213 SOLUTION TOPICAL at 05:38

## 2017-12-12 RX ADMIN — OLANZAPINE 10 MILLIGRAM(S): 15 TABLET, FILM COATED ORAL at 16:42

## 2017-12-12 RX ADMIN — GABAPENTIN 300 MILLIGRAM(S): 400 CAPSULE ORAL at 21:34

## 2017-12-12 NOTE — PROGRESS NOTE ADULT - PROBLEM SELECTOR PLAN 1
S/p trach collar, off antibiotics, Trach care, suctioning, Trach has been changed to 6mm Shilley, uncuffed, he tolerated the procedure, no more bleeding, will get swallow eval as well as phonation, he has not been on Vent,  has  will continue with humidified air.

## 2017-12-12 NOTE — PROGRESS NOTE ADULT - SUBJECTIVE AND OBJECTIVE BOX
HPI:  62 year old old male with atrial fibrillation, cirrhosis, CHF, alcohol abuse admitted with shortness of breath, admitted for COPD exacerbation, acute diastolic CHF and alcohol abuse, started on steroids, Librium protocol. He signed out AMA, returned to the hospital agitated, intoxicated. RRT was called later as he was in alcohol withdrawal, ICU stay.intubated on 11/4 for airway protection and altered mental status.  Mental status attributed to encephalopathy and alcohol withdrawal. EEG negative for seizures. He self extubated on 11/7, requiring reintubation. tracheostomy 11/10. Code sepsis on 11/13, readmitted to ICU. PEG   placed on 11/22.  Seen by Psych for agitation, needing medication adjustment. Now off xanax.     INTERVAL SUBJECTIVE & REVIEW OF SYMPTOMS:  Chart reviewed. Patient seen at bedside.  Trach changed to non cuffed.   1:1 at bedside.  ROS: Denies HA/CP  Wants to eat    Vital Signs Last 24 Hrs  T(C): 36.6 (12 Dec 2017 08:07), Max: 37.1 (11 Dec 2017 16:50)  T(F): 97.9 (12 Dec 2017 08:07), Max: 98.8 (12 Dec 2017 00:00)  HR: 80 (12 Dec 2017 08:07) (77 - 91)  BP: 101/60 (12 Dec 2017 08:07) (90/50 - 121/61)  BP(mean): --  RR: 19 (12 Dec 2017 08:07) (18 - 19)  SpO2: 98% (12 Dec 2017 08:05) (91% - 100%)       PHYSICAL EXAM  General: sleeping initially, but easily arousable, trach+ with trach collar.   cooperative with exam, but getting easily irritated  Cardio: S1S2+  Resp: clear  Abdomen: soft, PEG site with old dried blood  Neuro: alert, awake, oriented with cues, motor 4-5/5  Extrem: no edema, left BKA     Functional Exam: Transfers at supervision/ ambulation with PT with prosthesis    MEDICATIONS  (STANDING):  ALBUTerol/ipratropium for Nebulization 3 milliLiter(s) Nebulizer every 6 hours  chlorhexidine 0.12% Liquid 15 milliLiter(s) Swish and Spit two times a day  digoxin     Tablet 0.125 milliGRAM(s) Oral daily  diltiazem    Tablet 60 milliGRAM(s) Oral every 6 hours  enoxaparin Injectable 40 milliGRAM(s) SubCutaneous every 24 hours  folic acid 1 milliGRAM(s) Oral daily  gabapentin   Solution 300 milliGRAM(s) Oral every 8 hours  lactulose Syrup 10 Gram(s) Oral daily  lisinopril 5 milliGRAM(s) Oral daily  metoprolol     tartrate 25 milliGRAM(s) Oral every 8 hours  multivitamin 1 Tablet(s) Oral daily  OLANZapine 10 milliGRAM(s) Oral two times a day  pantoprazole   Suspension 40 milliGRAM(s) Oral daily  tamsulosin 0.4 milliGRAM(s) Oral at bedtime  thiamine 100 milliGRAM(s) Oral daily    MEDICATIONS  (PRN):  acetaminophen    Suspension 650 milliGRAM(s) Oral every 6 hours PRN For Temp greater than 38 C (100.4 F)  bisacodyl Suppository 10 milliGRAM(s) Rectal daily PRN Constipation  haloperidol    Injectable 2 milliGRAM(s) IntraMuscular every 6 hours PRN For agitation  metoprolol    tartrate Injectable 5 milliGRAM(s) IV Push every 6 hours PRN HR > 120      RECENT LABS:                            10.6   7.5   )-----------( 219      ( 09 Dec 2017 11:39 )             32.7     12-09    139  |  101  |  22.0<H>  ----------------------------<  107  4.7   |  25.0  |  0.68    Ca    9.2      09 Dec 2017 11:39    A/P:    62 year old male with prolonged complex hospital course with alcohol withdrawal, encephalopathy, cardiomyopathy, afib in intermittent RVR, Trach,  PEG,, episodes of agitation.    Hx of prior left BKA -   Patient on 1:1.     1. Recommend acute rehab, -PT/OT/ST for impairments due to above-     2. Trach change to non -cuffed  : May consider PMV Trial and swallow eval    3. Spoke to Dr. Soriano at insurance yesterday, who authorised acute rehab. D/W [facility]    Thank you

## 2017-12-12 NOTE — PROGRESS NOTE ADULT - PROBLEM SELECTOR PLAN 3
Maintain CO 1:1, on Olanzapine. Likely secondary to alcohol abuse.  12/4 Patient continues to climb out of bed. 12/5 Slightly better behaved on neurontin. 12/6 olanzapine increased. 12/7 Patient oversedated and xanax and zyprexa reduced. 12/8 Patient is agitated, zyprexa increased, xanax seems to be the agent that sedates him too much, no more on Xanax looks stable, as he is cooperative will discuss with psych team to see if 1:1 could be discontinued.

## 2017-12-12 NOTE — PROGRESS NOTE ADULT - SUBJECTIVE AND OBJECTIVE BOX
STONE DIAZ    [id number]    62y      Male    Patient is a 62y old  Male who presents with a chief complaint of Shortness of breath (30 Oct 2017 15:03)      INTERVAL HPI/OVERNIGHT EVENTS:    patient is doing ok, able to answer questions appropriately, he denies having chest pain, fever, pain any where, he tolerate procedure to changed his trac from cuffed to uncuffed.       REVIEW OF SYSTEMS:    CONSTITUTIONAL: No fever, some fatigue  RESPIRATORY: No cough, shortness of breath is better  CARDIOVASCULAR: No chest pain, palpitations  GASTROINTESTINAL: No abdominal.   NEUROLOGICAL: No headaches  MISCELLANEOUS: No joint swelling or pain         Vital Signs Last 24 Hrs  T(C): 36.6 (12 Dec 2017 08:07), Max: 37.1 (11 Dec 2017 16:50)  T(F): 97.9 (12 Dec 2017 08:07), Max: 98.8 (12 Dec 2017 00:00)  HR: 80 (12 Dec 2017 08:07) (77 - 91)  BP: 101/60 (12 Dec 2017 08:07) (90/50 - 121/61)  RR: 19 (12 Dec 2017 08:07) (18 - 19)  SpO2: 98% (12 Dec 2017 08:05) (91% - 100%)    PHYSICAL EXAM:    GENERAL: middle age male looking comfortable  HEENT: PERRL, +EOMI  NECK: tach in place   CHEST/LUNG: referred sounds bilaterally; No wheezing  HEART: S1S2+, Regular rate and rhythm; No murmurs  ABDOMEN: Soft, Nontender, Nondistended; Bowel sounds present  EXTREMITIES:  1+ Peripheral Pulses, No edema, left BKA  SKIN: No rashes or lesions  NEURO: AAOX3, no focal deficits.   PSYCH: Pleasant mood      LABS:                  I&O's Summary    11 Dec 2017 07:01  -  12 Dec 2017 07:00  --------------------------------------------------------  IN: 3480 mL / OUT: 0 mL / NET: 3480 mL        MEDICATIONS  (STANDING):  ALBUTerol/ipratropium for Nebulization 3 milliLiter(s) Nebulizer every 6 hours  chlorhexidine 0.12% Liquid 15 milliLiter(s) Swish and Spit two times a day  digoxin     Tablet 0.125 milliGRAM(s) Oral daily  diltiazem    Tablet 60 milliGRAM(s) Oral every 6 hours  enoxaparin Injectable 40 milliGRAM(s) SubCutaneous every 24 hours  folic acid 1 milliGRAM(s) Oral daily  gabapentin   Solution 300 milliGRAM(s) Oral every 8 hours  lactulose Syrup 10 Gram(s) Oral daily  lisinopril 5 milliGRAM(s) Oral daily  metoprolol     tartrate 25 milliGRAM(s) Oral every 8 hours  multivitamin 1 Tablet(s) Oral daily  OLANZapine 10 milliGRAM(s) Oral two times a day  pantoprazole   Suspension 40 milliGRAM(s) Oral daily  tamsulosin 0.4 milliGRAM(s) Oral at bedtime  thiamine 100 milliGRAM(s) Oral daily    MEDICATIONS  (PRN):  acetaminophen    Suspension 650 milliGRAM(s) Oral every 6 hours PRN For Temp greater than 38 C (100.4 F)  bisacodyl Suppository 10 milliGRAM(s) Rectal daily PRN Constipation  haloperidol    Injectable 2 milliGRAM(s) IntraMuscular every 6 hours PRN For agitation  metoprolol    tartrate Injectable 5 milliGRAM(s) IV Push every 6 hours PRN HR > 120

## 2017-12-12 NOTE — PROGRESS NOTE ADULT - ATTENDING COMMENTS
already got the auth for placement, will be waiting from psych clearance as well as swallow eval, then will transfer the patient.

## 2017-12-13 PROCEDURE — 99232 SBSQ HOSP IP/OBS MODERATE 35: CPT

## 2017-12-13 RX ADMIN — Medication 25 MILLIGRAM(S): at 05:00

## 2017-12-13 RX ADMIN — Medication 25 MILLIGRAM(S): at 22:33

## 2017-12-13 RX ADMIN — Medication 1 MILLIGRAM(S): at 12:45

## 2017-12-13 RX ADMIN — Medication 0.12 MILLIGRAM(S): at 05:00

## 2017-12-13 RX ADMIN — PANTOPRAZOLE SODIUM 40 MILLIGRAM(S): 20 TABLET, DELAYED RELEASE ORAL at 12:44

## 2017-12-13 RX ADMIN — Medication 3 MILLILITER(S): at 21:23

## 2017-12-13 RX ADMIN — CHLORHEXIDINE GLUCONATE 15 MILLILITER(S): 213 SOLUTION TOPICAL at 18:11

## 2017-12-13 RX ADMIN — GABAPENTIN 300 MILLIGRAM(S): 400 CAPSULE ORAL at 22:33

## 2017-12-13 RX ADMIN — CHLORHEXIDINE GLUCONATE 15 MILLILITER(S): 213 SOLUTION TOPICAL at 05:00

## 2017-12-13 RX ADMIN — Medication 100 MILLIGRAM(S): at 12:45

## 2017-12-13 RX ADMIN — Medication 1 TABLET(S): at 12:45

## 2017-12-13 RX ADMIN — LACTULOSE 10 GRAM(S): 10 SOLUTION ORAL at 12:45

## 2017-12-13 RX ADMIN — ENOXAPARIN SODIUM 40 MILLIGRAM(S): 100 INJECTION SUBCUTANEOUS at 12:44

## 2017-12-13 RX ADMIN — TAMSULOSIN HYDROCHLORIDE 0.4 MILLIGRAM(S): 0.4 CAPSULE ORAL at 22:33

## 2017-12-13 RX ADMIN — GABAPENTIN 300 MILLIGRAM(S): 400 CAPSULE ORAL at 13:00

## 2017-12-13 RX ADMIN — Medication 3 MILLILITER(S): at 03:27

## 2017-12-13 RX ADMIN — OLANZAPINE 10 MILLIGRAM(S): 15 TABLET, FILM COATED ORAL at 05:00

## 2017-12-13 RX ADMIN — OLANZAPINE 10 MILLIGRAM(S): 15 TABLET, FILM COATED ORAL at 18:46

## 2017-12-13 RX ADMIN — GABAPENTIN 300 MILLIGRAM(S): 400 CAPSULE ORAL at 05:00

## 2017-12-13 RX ADMIN — LISINOPRIL 5 MILLIGRAM(S): 2.5 TABLET ORAL at 05:00

## 2017-12-13 RX ADMIN — Medication 25 MILLIGRAM(S): at 13:00

## 2017-12-13 RX ADMIN — Medication 3 MILLILITER(S): at 08:51

## 2017-12-13 RX ADMIN — Medication 3 MILLILITER(S): at 15:44

## 2017-12-13 NOTE — PROGRESS NOTE BEHAVIORAL HEALTH - NSBHFUPINTERVALHXFT_PSY_A_CORE
Patient reports that he wants to leave. He reports that he does not care if he can't eat but he will eat anyway.  Patient was irritable.  Denies any S/H I/I/P.  Patient with 1:1at bedside.  As per nurse, patient has been impulsive and irritable.  He was agitated in am and attempted to get out of bed and pull out tube.  On at least two occasions 1:1 prevented patient from falling and redirected patient.

## 2017-12-13 NOTE — PROGRESS NOTE ADULT - SUBJECTIVE AND OBJECTIVE BOX
STONE DIAZ    [id number]    62y      Male    Patient is a 62y old  Male who presents with a chief complaint of Shortness of breath (30 Oct 2017 15:03)      INTERVAL HPI/OVERNIGHT EVENTS:    patient is doing ok, able to answer questions appropriately, he denies having chest pain, fever, pain any where, as per 1:1 observer he had some agitation, As per nurse, patient has been impulsive and irritable, He was agitated in am and attempted to get out of bed and pull out tube.  On at least two occasions 1:1 prevented patient from falling and redirected patient.    REVIEW OF SYSTEMS:    CONSTITUTIONAL: No fever, some fatigue  RESPIRATORY: No cough, shortness of breath is better  CARDIOVASCULAR: No chest pain, palpitations  GASTROINTESTINAL: No abdominal.   NEUROLOGICAL: No headaches  MISCELLANEOUS: No joint swelling or pain      Vital Signs Last 24 Hrs  T(C): 36.9 (13 Dec 2017 07:40), Max: 37.1 (12 Dec 2017 16:40)  T(F): 98.5 (13 Dec 2017 07:40), Max: 98.8 (12 Dec 2017 16:40)  HR: 85 (13 Dec 2017 07:40) (62 - 89)  BP: 110/74 (13 Dec 2017 07:40) (96/68 - 117/52)  RR: 19 (13 Dec 2017 07:40) (16 - 19)  SpO2: 99% (13 Dec 2017 07:40) (92% - 100%)    PHYSICAL EXAM:    GENERAL: middle age male looking comfortable  HEENT: PERRL, +EOMI  NECK: tach in place   CHEST/LUNG: referred sounds bilaterally; No wheezing  HEART: S1S2+, Regular rate and rhythm; No murmurs  ABDOMEN: Soft, Nontender, Nondistended; Bowel sounds present  EXTREMITIES:  1+ Peripheral Pulses, No edema, left BKA  SKIN: No rashes or lesions  NEURO: AAOX3, no focal deficits.   PSYCH: Pleasant mood      12 Dec 2017 07:01  -  13 Dec 2017 07:00  --------------------------------------------------------  IN: 1840 mL / OUT: 0 mL / NET: 1840 mL    13 Dec 2017 07:01  -  13 Dec 2017 15:56  --------------------------------------------------------  IN: 0 mL / OUT: 575 mL / NET: -575 mL        MEDICATIONS  (STANDING):  ALBUTerol/ipratropium for Nebulization 3 milliLiter(s) Nebulizer every 6 hours  chlorhexidine 0.12% Liquid 15 milliLiter(s) Swish and Spit two times a day  digoxin     Tablet 0.125 milliGRAM(s) Oral daily  diltiazem    Tablet 60 milliGRAM(s) Oral every 6 hours  enoxaparin Injectable 40 milliGRAM(s) SubCutaneous every 24 hours  folic acid 1 milliGRAM(s) Oral daily  gabapentin   Solution 300 milliGRAM(s) Oral every 8 hours  lactulose Syrup 10 Gram(s) Oral daily  lisinopril 5 milliGRAM(s) Oral daily  metoprolol     tartrate 25 milliGRAM(s) Oral every 8 hours  multivitamin 1 Tablet(s) Oral daily  OLANZapine 10 milliGRAM(s) Oral two times a day  pantoprazole   Suspension 40 milliGRAM(s) Oral daily  tamsulosin 0.4 milliGRAM(s) Oral at bedtime  thiamine 100 milliGRAM(s) Oral daily    MEDICATIONS  (PRN):  acetaminophen    Suspension 650 milliGRAM(s) Oral every 6 hours PRN For Temp greater than 38 C (100.4 F)  bisacodyl Suppository 10 milliGRAM(s) Rectal daily PRN Constipation  haloperidol    Injectable 2 milliGRAM(s) IntraMuscular every 6 hours PRN For agitation  metoprolol    tartrate Injectable 5 milliGRAM(s) IV Push every 6 hours PRN HR > 120

## 2017-12-13 NOTE — PROGRESS NOTE ADULT - PROBLEM SELECTOR PLAN 1
S/p trach collar, off antibiotics, Trach care, suctioning, Trach has been changed to 6mm Shilley, uncuffed, he tolerated the procedure, no more bleeding, will get modified barium swallow, as per speech therapy team, he is able to talk as well, he has not been on Vent,  has  will continue with humidified air.

## 2017-12-13 NOTE — PROGRESS NOTE ADULT - PROVIDER SPECIALTY LIST ADULT
Anesthesia
Cardiology
Critical Care
Gastroenterology
Hospitalist
MICU
Psychiatry
Psychiatry
Pulmonology
Rehab Medicine
Rehab Medicine
Thoracic Surgery
Anesthesia
Critical Care
Critical Care
Pulmonology
Critical Care
Critical Care
Gastroenterology
Critical Care
Gastroenterology
Hospitalist
Critical Care

## 2017-12-13 NOTE — PROGRESS NOTE ADULT - PROBLEM SELECTOR PROBLEM 4
Alcohol withdrawal delirium
Atrial fibrillation with RVR
Atrial fibrillation, unspecified type
ETOH abuse
Chronic obstructive pulmonary disease, unspecified COPD type
Afib
Alcohol abuse
Alcohol withdrawal
Alcohol withdrawal
Alcohol withdrawal delirium
Fever, unspecified fever cause
Afib
Alcohol withdrawal
Alcohol withdrawal delirium
Aspiration pneumonia
Chronic obstructive pulmonary disease, unspecified COPD type
Encephalopathy acute
Sepsis
Afib
Altered mental status, unspecified altered mental status type
Aspiration pneumonia
Febrile
R/O Aspiration pneumonia
ETOH abuse
Sepsis
Febrile
Aspiration pneumonia of upper lobe, unspecified aspiration pneumonia type, unspecified laterality

## 2017-12-13 NOTE — PROGRESS NOTE ADULT - PROBLEM SELECTOR PLAN 3
Maintain CO 1:1, on Olanzapine. Likely secondary to alcohol abuse.  12/4 Patient continues to climb out of bed, 12/6 olanzapine increased,12/7 Patient oversedated and xanax and zyprexa reduced, 12/8 Patient is agitated, zyprexa increased, xanax seemed to be the agent that sedates him too much and was discontinued, as he is cooperative will discuss with psych team to see if 1:1 could be discontinued.

## 2017-12-13 NOTE — PROGRESS NOTE ADULT - PROBLEM SELECTOR PROBLEM 1
Altered mental status, unspecified altered mental status type
Respiratory failure
Acute respiratory failure with hypoxia
Altered mental status
Atrial fibrillation, unspecified type
Encephalopathy acute
Pharyngeal dysphagia
Respiratory failure
Acute respiratory failure with hypoxia
Afib
Alcohol withdrawal
Alcohol withdrawal delirium
Atrial fibrillation, unspecified type
Atrial fibrillation, unspecified type
Chronic respiratory disease
Chronic respiratory disease
Respiratory failure
Subacute delirium due to underlying condition
Subacute delirium due to underlying condition
Acute respiratory failure with hypoxia
Acute respiratory failure with hypoxia
Chronic respiratory disease
Encephalopathy acute
Respiratory failure
Acute respiratory failure with hypoxia
Acute respiratory failure with hypoxia
Chronic respiratory disease
Delirium tremens
Chronic respiratory disease

## 2017-12-14 ENCOUNTER — INPATIENT (INPATIENT)
Facility: HOSPITAL | Age: 62
LOS: 19 days | Discharge: HOME HEALTH | DRG: 949 | End: 2018-01-03
Attending: PHYSICAL MEDICINE & REHABILITATION | Admitting: PHYSICAL MEDICINE & REHABILITATION
Payer: MEDICARE

## 2017-12-14 VITALS
OXYGEN SATURATION: 98 % | RESPIRATION RATE: 18 BRPM | DIASTOLIC BLOOD PRESSURE: 52 MMHG | SYSTOLIC BLOOD PRESSURE: 100 MMHG

## 2017-12-14 DIAGNOSIS — J96.10 CHRONIC RESPIRATORY FAILURE, UNSPECIFIED WHETHER WITH HYPOXIA OR HYPERCAPNIA: ICD-10-CM

## 2017-12-14 DIAGNOSIS — G93.40 ENCEPHALOPATHY, UNSPECIFIED: ICD-10-CM

## 2017-12-14 DIAGNOSIS — Z89.512 ACQUIRED ABSENCE OF LEFT LEG BELOW KNEE: Chronic | ICD-10-CM

## 2017-12-14 PROCEDURE — 87070 CULTURE OTHR SPECIMN AEROBIC: CPT

## 2017-12-14 PROCEDURE — 97110 THERAPEUTIC EXERCISES: CPT

## 2017-12-14 PROCEDURE — 83550 IRON BINDING TEST: CPT

## 2017-12-14 PROCEDURE — 96374 THER/PROPH/DIAG INJ IV PUSH: CPT

## 2017-12-14 PROCEDURE — 99232 SBSQ HOSP IP/OBS MODERATE 35: CPT

## 2017-12-14 PROCEDURE — 85027 COMPLETE CBC AUTOMATED: CPT

## 2017-12-14 PROCEDURE — 84443 ASSAY THYROID STIM HORMONE: CPT

## 2017-12-14 PROCEDURE — 87086 URINE CULTURE/COLONY COUNT: CPT

## 2017-12-14 PROCEDURE — 71045 X-RAY EXAM CHEST 1 VIEW: CPT

## 2017-12-14 PROCEDURE — 97530 THERAPEUTIC ACTIVITIES: CPT

## 2017-12-14 PROCEDURE — 97535 SELF CARE MNGMENT TRAINING: CPT

## 2017-12-14 PROCEDURE — 81001 URINALYSIS AUTO W/SCOPE: CPT

## 2017-12-14 PROCEDURE — 85610 PROTHROMBIN TIME: CPT

## 2017-12-14 PROCEDURE — 80048 BASIC METABOLIC PNL TOTAL CA: CPT

## 2017-12-14 PROCEDURE — 84478 ASSAY OF TRIGLYCERIDES: CPT

## 2017-12-14 PROCEDURE — 94640 AIRWAY INHALATION TREATMENT: CPT

## 2017-12-14 PROCEDURE — 86900 BLOOD TYPING SEROLOGIC ABO: CPT

## 2017-12-14 PROCEDURE — 97116 GAIT TRAINING THERAPY: CPT

## 2017-12-14 PROCEDURE — 74230 X-RAY XM SWLNG FUNCJ C+: CPT | Mod: 26

## 2017-12-14 PROCEDURE — 99239 HOSP IP/OBS DSCHRG MGMT >30: CPT

## 2017-12-14 PROCEDURE — 36415 COLL VENOUS BLD VENIPUNCTURE: CPT

## 2017-12-14 PROCEDURE — 87150 DNA/RNA AMPLIFIED PROBE: CPT

## 2017-12-14 PROCEDURE — 84100 ASSAY OF PHOSPHORUS: CPT

## 2017-12-14 PROCEDURE — 82553 CREATINE MB FRACTION: CPT

## 2017-12-14 PROCEDURE — 71046 X-RAY EXAM CHEST 2 VIEWS: CPT

## 2017-12-14 PROCEDURE — 94003 VENT MGMT INPAT SUBQ DAY: CPT

## 2017-12-14 PROCEDURE — 99222 1ST HOSP IP/OBS MODERATE 55: CPT

## 2017-12-14 PROCEDURE — 85730 THROMBOPLASTIN TIME PARTIAL: CPT

## 2017-12-14 PROCEDURE — 94002 VENT MGMT INPAT INIT DAY: CPT

## 2017-12-14 PROCEDURE — 82140 ASSAY OF AMMONIA: CPT

## 2017-12-14 PROCEDURE — 99231 SBSQ HOSP IP/OBS SF/LOW 25: CPT

## 2017-12-14 PROCEDURE — 82435 ASSAY OF BLOOD CHLORIDE: CPT

## 2017-12-14 PROCEDURE — 93306 TTE W/DOPPLER COMPLETE: CPT

## 2017-12-14 PROCEDURE — C1752: CPT

## 2017-12-14 PROCEDURE — 80307 DRUG TEST PRSMV CHEM ANLYZR: CPT

## 2017-12-14 PROCEDURE — 85014 HEMATOCRIT: CPT

## 2017-12-14 PROCEDURE — 94799 UNLISTED PULMONARY SVC/PX: CPT

## 2017-12-14 PROCEDURE — 83735 ASSAY OF MAGNESIUM: CPT

## 2017-12-14 PROCEDURE — 93017 CV STRESS TEST TRACING ONLY: CPT

## 2017-12-14 PROCEDURE — 87040 BLOOD CULTURE FOR BACTERIA: CPT

## 2017-12-14 PROCEDURE — 84145 PROCALCITONIN (PCT): CPT

## 2017-12-14 PROCEDURE — 83605 ASSAY OF LACTIC ACID: CPT

## 2017-12-14 PROCEDURE — 70450 CT HEAD/BRAIN W/O DYE: CPT

## 2017-12-14 PROCEDURE — 86901 BLOOD TYPING SEROLOGIC RH(D): CPT

## 2017-12-14 PROCEDURE — 86850 RBC ANTIBODY SCREEN: CPT

## 2017-12-14 PROCEDURE — P9047: CPT

## 2017-12-14 PROCEDURE — A9500: CPT

## 2017-12-14 PROCEDURE — 76700 US EXAM ABDOM COMPLETE: CPT

## 2017-12-14 PROCEDURE — 84466 ASSAY OF TRANSFERRIN: CPT

## 2017-12-14 PROCEDURE — 92611 MOTION FLUOROSCOPY/SWALLOW: CPT

## 2017-12-14 PROCEDURE — 84132 ASSAY OF SERUM POTASSIUM: CPT

## 2017-12-14 PROCEDURE — 97167 OT EVAL HIGH COMPLEX 60 MIN: CPT

## 2017-12-14 PROCEDURE — 71275 CT ANGIOGRAPHY CHEST: CPT

## 2017-12-14 PROCEDURE — 82746 ASSAY OF FOLIC ACID SERUM: CPT

## 2017-12-14 PROCEDURE — 83880 ASSAY OF NATRIURETIC PEPTIDE: CPT

## 2017-12-14 PROCEDURE — 87186 SC STD MICRODIL/AGAR DIL: CPT

## 2017-12-14 PROCEDURE — 84295 ASSAY OF SERUM SODIUM: CPT

## 2017-12-14 PROCEDURE — 99285 EMERGENCY DEPT VISIT HI MDM: CPT | Mod: 25

## 2017-12-14 PROCEDURE — 73030 X-RAY EXAM OF SHOULDER: CPT

## 2017-12-14 PROCEDURE — 80162 ASSAY OF DIGOXIN TOTAL: CPT

## 2017-12-14 PROCEDURE — 70551 MRI BRAIN STEM W/O DYE: CPT

## 2017-12-14 PROCEDURE — 82962 GLUCOSE BLOOD TEST: CPT

## 2017-12-14 PROCEDURE — C1889: CPT

## 2017-12-14 PROCEDURE — 82550 ASSAY OF CK (CPK): CPT

## 2017-12-14 PROCEDURE — 99221 1ST HOSP IP/OBS SF/LOW 40: CPT

## 2017-12-14 PROCEDURE — 82607 VITAMIN B-12: CPT

## 2017-12-14 PROCEDURE — 82947 ASSAY GLUCOSE BLOOD QUANT: CPT

## 2017-12-14 PROCEDURE — 86140 C-REACTIVE PROTEIN: CPT

## 2017-12-14 PROCEDURE — 94760 N-INVAS EAR/PLS OXIMETRY 1: CPT

## 2017-12-14 PROCEDURE — 96375 TX/PRO/DX INJ NEW DRUG ADDON: CPT

## 2017-12-14 PROCEDURE — 78452 HT MUSCLE IMAGE SPECT MULT: CPT

## 2017-12-14 PROCEDURE — 82330 ASSAY OF CALCIUM: CPT

## 2017-12-14 PROCEDURE — 74000: CPT

## 2017-12-14 PROCEDURE — 97163 PT EVAL HIGH COMPLEX 45 MIN: CPT

## 2017-12-14 PROCEDURE — 80053 COMPREHEN METABOLIC PANEL: CPT

## 2017-12-14 PROCEDURE — 82728 ASSAY OF FERRITIN: CPT

## 2017-12-14 PROCEDURE — 82803 BLOOD GASES ANY COMBINATION: CPT

## 2017-12-14 PROCEDURE — 84484 ASSAY OF TROPONIN QUANT: CPT

## 2017-12-14 PROCEDURE — 95819 EEG AWAKE AND ASLEEP: CPT

## 2017-12-14 PROCEDURE — 93005 ELECTROCARDIOGRAM TRACING: CPT

## 2017-12-14 PROCEDURE — 74230 X-RAY XM SWLNG FUNCJ C+: CPT

## 2017-12-14 PROCEDURE — 36600 WITHDRAWAL OF ARTERIAL BLOOD: CPT

## 2017-12-14 RX ORDER — FUROSEMIDE 40 MG
1 TABLET ORAL
Qty: 0 | Refills: 0 | COMMUNITY

## 2017-12-14 RX ORDER — PANTOPRAZOLE SODIUM 20 MG/1
40 TABLET, DELAYED RELEASE ORAL
Qty: 0 | Refills: 0 | DISCHARGE
Start: 2017-12-14

## 2017-12-14 RX ORDER — METOPROLOL TARTRATE 50 MG
1 TABLET ORAL
Qty: 0 | Refills: 0 | COMMUNITY
Start: 2017-12-14

## 2017-12-14 RX ORDER — LISINOPRIL 2.5 MG/1
1 TABLET ORAL
Qty: 0 | Refills: 0 | DISCHARGE
Start: 2017-12-14

## 2017-12-14 RX ORDER — TRAZODONE HCL 50 MG
1 TABLET ORAL
Qty: 0 | Refills: 0 | COMMUNITY

## 2017-12-14 RX ORDER — ACETAMINOPHEN 500 MG
20.31 TABLET ORAL
Qty: 0 | Refills: 0 | DISCHARGE
Start: 2017-12-14

## 2017-12-14 RX ORDER — FOLIC ACID 0.8 MG
1 TABLET ORAL
Qty: 0 | Refills: 0 | DISCHARGE
Start: 2017-12-14

## 2017-12-14 RX ORDER — THIAMINE MONONITRATE (VIT B1) 100 MG
1 TABLET ORAL
Qty: 0 | Refills: 0 | COMMUNITY
Start: 2017-12-14

## 2017-12-14 RX ORDER — GABAPENTIN 400 MG/1
6 CAPSULE ORAL
Qty: 0 | Refills: 0 | COMMUNITY
Start: 2017-12-14

## 2017-12-14 RX ORDER — TAMSULOSIN HYDROCHLORIDE 0.4 MG/1
1 CAPSULE ORAL
Qty: 0 | Refills: 0 | DISCHARGE
Start: 2017-12-14

## 2017-12-14 RX ORDER — DILTIAZEM HCL 120 MG
1 CAPSULE, EXT RELEASE 24 HR ORAL
Qty: 0 | Refills: 0 | DISCHARGE
Start: 2017-12-14

## 2017-12-14 RX ORDER — METOPROLOL TARTRATE 50 MG
1 TABLET ORAL
Qty: 0 | Refills: 0 | COMMUNITY

## 2017-12-14 RX ORDER — OLANZAPINE 15 MG/1
1 TABLET, FILM COATED ORAL
Qty: 0 | Refills: 0 | COMMUNITY
Start: 2017-12-14

## 2017-12-14 RX ORDER — ERGOCALCIFEROL 1.25 MG/1
1 CAPSULE ORAL
Qty: 0 | Refills: 0 | COMMUNITY

## 2017-12-14 RX ORDER — LACTULOSE 10 G/15ML
15 SOLUTION ORAL
Qty: 0 | Refills: 0 | COMMUNITY
Start: 2017-12-14

## 2017-12-14 RX ORDER — IPRATROPIUM/ALBUTEROL SULFATE 18-103MCG
3 AEROSOL WITH ADAPTER (GRAM) INHALATION
Qty: 0 | Refills: 0 | DISCHARGE
Start: 2017-12-14

## 2017-12-14 RX ORDER — BUDESONIDE AND FORMOTEROL FUMARATE DIHYDRATE 160; 4.5 UG/1; UG/1
2 AEROSOL RESPIRATORY (INHALATION)
Qty: 0 | Refills: 0 | COMMUNITY

## 2017-12-14 RX ORDER — ASPIRIN/CALCIUM CARB/MAGNESIUM 324 MG
1 TABLET ORAL
Qty: 0 | Refills: 0 | COMMUNITY

## 2017-12-14 RX ORDER — DILTIAZEM HCL 120 MG
1 CAPSULE, EXT RELEASE 24 HR ORAL
Qty: 0 | Refills: 0 | COMMUNITY
Start: 2017-12-14

## 2017-12-14 RX ORDER — METOPROLOL TARTRATE 50 MG
2 TABLET ORAL
Qty: 0 | Refills: 0 | COMMUNITY

## 2017-12-14 RX ORDER — ALBUTEROL 90 UG/1
2 AEROSOL, METERED ORAL
Qty: 0 | Refills: 0 | COMMUNITY

## 2017-12-14 RX ORDER — TRAZODONE HCL 50 MG
2 TABLET ORAL
Qty: 0 | Refills: 0 | COMMUNITY

## 2017-12-14 RX ORDER — DIGOXIN 250 MCG
1 TABLET ORAL
Qty: 0 | Refills: 0 | DISCHARGE
Start: 2017-12-14

## 2017-12-14 RX ADMIN — GABAPENTIN 300 MILLIGRAM(S): 400 CAPSULE ORAL at 05:18

## 2017-12-14 RX ADMIN — Medication 100 MILLIGRAM(S): at 12:36

## 2017-12-14 RX ADMIN — OLANZAPINE 10 MILLIGRAM(S): 15 TABLET, FILM COATED ORAL at 05:18

## 2017-12-14 RX ADMIN — Medication 1 MILLIGRAM(S): at 12:36

## 2017-12-14 RX ADMIN — LISINOPRIL 5 MILLIGRAM(S): 2.5 TABLET ORAL at 05:18

## 2017-12-14 RX ADMIN — Medication 25 MILLIGRAM(S): at 05:18

## 2017-12-14 RX ADMIN — Medication 3 MILLILITER(S): at 03:35

## 2017-12-14 RX ADMIN — CHLORHEXIDINE GLUCONATE 15 MILLILITER(S): 213 SOLUTION TOPICAL at 05:20

## 2017-12-14 RX ADMIN — LACTULOSE 10 GRAM(S): 10 SOLUTION ORAL at 12:11

## 2017-12-14 RX ADMIN — PANTOPRAZOLE SODIUM 40 MILLIGRAM(S): 20 TABLET, DELAYED RELEASE ORAL at 12:37

## 2017-12-14 RX ADMIN — Medication 1 TABLET(S): at 12:36

## 2017-12-14 RX ADMIN — Medication 0.12 MILLIGRAM(S): at 05:18

## 2017-12-14 RX ADMIN — ENOXAPARIN SODIUM 40 MILLIGRAM(S): 100 INJECTION SUBCUTANEOUS at 12:36

## 2017-12-14 NOTE — SWALLOW VFSS/MBS ASSESSMENT ADULT - DIAGNOSTIC IMPRESSIONS
Oral and pharyngeal stage of swallow for soft consistencies WFL.  Mild- moderate pharyngeal dysphagia for all liquids

## 2017-12-14 NOTE — PROGRESS NOTE BEHAVIORAL HEALTH - NSBHFUPINTERVALHXFT_PSY_A_CORE
impulsive  follows simple directions more cooperative with PT ambulates with assistance has prosthesis remains on 1 to 1 for safety concern No aggressive episodes documented compliant with personal care

## 2017-12-14 NOTE — PROGRESS NOTE BEHAVIORAL HEALTH - NSBHCHARTREVIEWVS_PSY_A_CORE FT
Vital Signs Last 24 Hrs  T(C): 36.4 (06 Dec 2017 08:21), Max: 38.1 (05 Dec 2017 23:35)  T(F): 97.6 (06 Dec 2017 08:21), Max: 100.5 (05 Dec 2017 23:35)  HR: 83 (06 Dec 2017 08:21) (83 - 109)  BP: 115/56 (06 Dec 2017 08:21) (100/56 - 131/68)  BP(mean): --  RR: 18 (06 Dec 2017 08:21) (18 - 24)  SpO2: 99% (06 Dec 2017 08:21) (94% - 99%)
Vital Signs Last 24 Hrs  T(C): 36.9 (13 Dec 2017 07:40), Max: 37.1 (12 Dec 2017 16:40)  T(F): 98.5 (13 Dec 2017 07:40), Max: 98.8 (12 Dec 2017 16:40)  HR: 85 (13 Dec 2017 07:40) (62 - 89)  BP: 110/74 (13 Dec 2017 07:40) (96/68 - 117/52)  BP(mean): --  RR: 19 (13 Dec 2017 07:40) (16 - 19)  SpO2: 99% (13 Dec 2017 07:40) (92% - 100%)
Vital Signs Last 24 Hrs  T(C): 36.7 (06 Dec 2017 21:00), Max: 36.7 (06 Dec 2017 21:00)  T(F): 98 (06 Dec 2017 21:00), Max: 98 (06 Dec 2017 21:00)  HR: 64 (07 Dec 2017 14:00) (64 - 97)  BP: 104/50 (07 Dec 2017 05:36) (100/50 - 104/50)  BP(mean): --  RR: 18 (07 Dec 2017 14:00) (18 - 20)  SpO2: 98% (07 Dec 2017 14:00) (88% - 98%)
Vital Signs Last 24 Hrs  T(C): 36.8 (08 Dec 2017 07:28), Max: 37.2 (07 Dec 2017 14:00)  T(F): 98.2 (08 Dec 2017 07:28), Max: 99 (07 Dec 2017 14:00)  HR: 91 (08 Dec 2017 07:28) (64 - 93)  BP: 92/52 (08 Dec 2017 07:28) (92/52 - 115/59)  BP(mean): --  RR: 19 (08 Dec 2017 07:28) (15 - 19)  SpO2: 100% (08 Dec 2017 09:15) (96% - 100%)
Vital Signs Last 24 Hrs  T(C): 36.7 (14 Dec 2017 08:24), Max: 37.3 (13 Dec 2017 17:29)  T(F): 98.1 (14 Dec 2017 08:24), Max: 99.1 (13 Dec 2017 17:29)  HR: 68 (14 Dec 2017 08:24) (54 - 82)  BP: 92/55 (14 Dec 2017 08:24) (87/59 - 129/72)  BP(mean): --  RR: 18 (14 Dec 2017 08:24) (18 - 19)  SpO2: 97% (14 Dec 2017 08:24) (97% - 99%)

## 2017-12-14 NOTE — SWALLOW VFSS/MBS ASSESSMENT ADULT - SLP GENERAL OBSERVATIONS
Pt alert awake Ox3 (grossly). +cognitive deficits. +trach cuffless fenestrated +O2 trach collar.  pt currently on a 1:1 for safety

## 2017-12-14 NOTE — SWALLOW VFSS/MBS ASSESSMENT ADULT - ROSENBEK'S PENETRATION ASPIRATION SCALE
(1) no aspiration, contrast does not enter airway (3) contrast remains above the vocal cords, visible residue remains (penetration) (7) contrast passes glottis, visible subglottic residue remains despite patient’s response (aspiration)

## 2017-12-14 NOTE — CHART NOTE - NSCHARTNOTEFT_GEN_A_CORE
Source: Patient [ ]  Family [ ]   other [ x]    Current Diet:  starting on soft diet today    Patient reports [ ] nausea  [ ] vomiting [ ] diarrhea [ ] constipation  [ ]chewing problems [ ] swallowing issues  [ ] other:     PO intake:  < 50% [ ]   50-75%  [ ]   %  [ ]  other :    Source for PO intake [ ] Patient [ ] family [ ] chart [ ] staff [ ] other    Enteral /Parenteral Nutrition:     Current Weight: 98 kg    % Weight Change     Pertinent Medications: MEDICATIONS  (STANDING):  ALBUTerol/ipratropium for Nebulization 3 milliLiter(s) Nebulizer every 6 hours  chlorhexidine 0.12% Liquid 15 milliLiter(s) Swish and Spit two times a day  digoxin     Tablet 0.125 milliGRAM(s) Oral daily  diltiazem    Tablet 60 milliGRAM(s) Oral every 6 hours  enoxaparin Injectable 40 milliGRAM(s) SubCutaneous every 24 hours  folic acid 1 milliGRAM(s) Oral daily  gabapentin   Solution 300 milliGRAM(s) Oral every 8 hours  lactulose Syrup 10 Gram(s) Oral daily  lisinopril 5 milliGRAM(s) Oral daily  metoprolol     tartrate 25 milliGRAM(s) Oral every 8 hours  multivitamin 1 Tablet(s) Oral daily  OLANZapine 10 milliGRAM(s) Oral two times a day  pantoprazole   Suspension 40 milliGRAM(s) Oral daily  tamsulosin 0.4 milliGRAM(s) Oral at bedtime  thiamine 100 milliGRAM(s) Oral daily    MEDICATIONS  (PRN):  acetaminophen    Suspension 650 milliGRAM(s) Oral every 6 hours PRN For Temp greater than 38 C (100.4 F)  bisacodyl Suppository 10 milliGRAM(s) Rectal daily PRN Constipation  haloperidol    Injectable 2 milliGRAM(s) IntraMuscular every 6 hours PRN For agitation  metoprolol    tartrate Injectable 5 milliGRAM(s) IV Push every 6 hours PRN HR > 120    Pertinent Labs:         Skin:     Nutrition focused physical exam conducted - found signs of malnutrition [ ]absent [ ]present    Subcutaneous fat loss: [ ] Orbital fat pads region, [ ]Buccal fat region, [ ]Triceps region,  [ ]Ribs region    Muscle wasting: [ ]Temples region, [ ]Clavicle region, [ ]Shoulder region, [ ]Scapula region, [ ]Interosseous region,  [ ]thigh region, [ ]Calf region    Estimated Needs:   [ ] no change since previous assessment  [ x] recalculated:  (5126-1837 kcal )118- 147 g pro    Current Nutrition Diagnosis: Pt is at risk secondary to swallowing issues related to complicated medical hx and hospital course involving Resp failure with trach, as evidenced by was reliant on artificial nutrition to meet needs. Pt starting on soft diet today and will be going to rehab.    Recommendations:   nocturnal feed of Jevity 1.5 at 80 ml /hr from 6pm-6am (1440kcal, 61 g pro) this will provide ~60 % of calorie needs and 50% protein needs.   If PO intake is less than 50% at meals, bolus 240 ml of Jevity 1.5 to ensure 100% of needs are met.  Continue free water bolus     Monitoring and Evaluation:   [ ] PO intake [ ] Tolerance to diet prescription [X] Weights  [X] Follow up per protocol [X] Labs:

## 2017-12-14 NOTE — SWALLOW VFSS/MBS ASSESSMENT ADULT - ORAL PHASE
Uncontrolled bolus / spillover in hypopharynx/Reduced anterior - posterior transport Uncontrolled bolus / spillover in hypopharynx within functional limits Delayed oral transit time

## 2017-12-14 NOTE — PROGRESS NOTE BEHAVIORAL HEALTH - NSBHCONSULTMEDS_PSY_A_CORE FT
olanzapine 10 mg q 12  gabapentin 300 mg q 8
olanzapine 10 mg via PEG q 12  gabapentin 300 mg q 8
gabapentin Olanzapine
gabapentin Olanzapine
olanzapine 10 mg q 12  gabapentin 300 mg q 8

## 2017-12-14 NOTE — PROGRESS NOTE BEHAVIORAL HEALTH - AXIS III
see hospitalist note

## 2017-12-14 NOTE — PROGRESS NOTE BEHAVIORAL HEALTH - NSBHATTESTSEENBY_PSY_A_CORE
attending Psychiatrist without NP/Trainee
NP with telephonic supervision from Attending Psychiatrist
attending Psychiatrist without NP/Trainee

## 2017-12-14 NOTE — PROGRESS NOTE BEHAVIORAL HEALTH - SUMMARY
behavior remains problematic medication adjustments in progress
Currently lethargic, opens eyes to stimuli. Case discussed with Dr Lucas  Recommend  decreased Zyprexa to 5 mg qam and hs,   avoid benzo's if possible- may contribute to delirium  continue Gabapentin 300mg q8h
omar on current regime No psychiatric contra-indication for transfer to inpatient rehab unit Advise psychiatric follow up for medication management
continues very agiated
Delirium resolving, calmer but still impulsive and intermittently engaging in behaviors that put him at risk.

## 2017-12-14 NOTE — PROGRESS NOTE BEHAVIORAL HEALTH - PRIMARY DX
Subacute delirium due to underlying condition

## 2017-12-14 NOTE — PROGRESS NOTE BEHAVIORAL HEALTH - NSBHCONSULTOBS_PSY_A_CORE
Constant observation

## 2017-12-14 NOTE — SWALLOW VFSS/MBS ASSESSMENT ADULT - RECOMMENDED FEEDING/EATING TECHNIQUES
crush medication (when feasible)/position upright (90 degrees)/small sips/bites/maintain upright posture during/after eating for 30 mins/oral hygiene

## 2017-12-14 NOTE — PROGRESS NOTE BEHAVIORAL HEALTH - NSBHCONSFOLLOWNEEDS_PSY_A_CORE
no psychiatric contraindications to discharge
Patient needs further psychiatric safety assessment prior to discharge

## 2017-12-14 NOTE — SWALLOW VFSS/MBS ASSESSMENT ADULT - SUCCESSFUL STRATEGIES TRIALED DURING PROCEDURE
tsp size/small cup sips eliminated penetration for nectar liquids small tsp size or small cup sips eliminated aspiration/penetration
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
## 2017-12-14 NOTE — SWALLOW VFSS/MBS ASSESSMENT ADULT - PHARYNGEAL PHASE COMMENTS
subsequent swallows cleared valleculae residue subsequent swallows cleared valleculae residue. Large cup sips lead to penetration when self fed large cups sips lead to penetration however tsp size or small cup sips eliminated aspiration/penetration

## 2017-12-14 NOTE — PROGRESS NOTE BEHAVIORAL HEALTH - NSBHCONSULTOBSREASON_PSY_A_CORE FT
very agitated potential for self injury
safety concerns fall  precaution monitor trach collar
Continues to be impulsive and intermittently agitated, requiring redirection from 1:1

## 2017-12-14 NOTE — PROGRESS NOTE BEHAVIORAL HEALTH - RISK ASSESSMENT
high risk of violent behaviors
NA
elevated risk of unintentional self injury due to impulsivity
NA
Moderate-given impulsive behavior, irritability

## 2017-12-15 PROCEDURE — 99233 SBSQ HOSP IP/OBS HIGH 50: CPT

## 2017-12-15 PROCEDURE — 99223 1ST HOSP IP/OBS HIGH 75: CPT

## 2017-12-16 PROCEDURE — 99232 SBSQ HOSP IP/OBS MODERATE 35: CPT

## 2017-12-17 PROCEDURE — 99232 SBSQ HOSP IP/OBS MODERATE 35: CPT

## 2017-12-18 PROCEDURE — 99233 SBSQ HOSP IP/OBS HIGH 50: CPT

## 2017-12-19 DIAGNOSIS — J96.10 CHRONIC RESPIRATORY FAILURE, UNSPECIFIED WHETHER WITH HYPOXIA OR HYPERCAPNIA: ICD-10-CM

## 2017-12-19 PROCEDURE — 99233 SBSQ HOSP IP/OBS HIGH 50: CPT

## 2017-12-20 PROCEDURE — 96116 NUBHVL XM PHYS/QHP 1ST HR: CPT

## 2017-12-20 PROCEDURE — 99233 SBSQ HOSP IP/OBS HIGH 50: CPT

## 2017-12-21 PROCEDURE — 99232 SBSQ HOSP IP/OBS MODERATE 35: CPT

## 2017-12-21 PROCEDURE — 74230 X-RAY XM SWLNG FUNCJ C+: CPT | Mod: 26

## 2017-12-22 PROCEDURE — 99232 SBSQ HOSP IP/OBS MODERATE 35: CPT

## 2017-12-23 PROCEDURE — 99232 SBSQ HOSP IP/OBS MODERATE 35: CPT | Mod: GC

## 2017-12-24 PROCEDURE — 99232 SBSQ HOSP IP/OBS MODERATE 35: CPT | Mod: GC

## 2017-12-25 PROCEDURE — 99232 SBSQ HOSP IP/OBS MODERATE 35: CPT | Mod: GC

## 2017-12-26 PROCEDURE — 99232 SBSQ HOSP IP/OBS MODERATE 35: CPT

## 2017-12-26 PROCEDURE — 99233 SBSQ HOSP IP/OBS HIGH 50: CPT

## 2017-12-27 PROCEDURE — 99232 SBSQ HOSP IP/OBS MODERATE 35: CPT

## 2017-12-28 PROCEDURE — 99232 SBSQ HOSP IP/OBS MODERATE 35: CPT

## 2017-12-29 PROCEDURE — 99232 SBSQ HOSP IP/OBS MODERATE 35: CPT

## 2017-12-30 PROCEDURE — 99232 SBSQ HOSP IP/OBS MODERATE 35: CPT

## 2017-12-31 PROCEDURE — 99232 SBSQ HOSP IP/OBS MODERATE 35: CPT

## 2018-01-01 PROCEDURE — 99232 SBSQ HOSP IP/OBS MODERATE 35: CPT

## 2018-01-02 PROCEDURE — 99232 SBSQ HOSP IP/OBS MODERATE 35: CPT

## 2018-01-03 PROCEDURE — 94640 AIRWAY INHALATION TREATMENT: CPT

## 2018-01-03 PROCEDURE — 85025 COMPLETE CBC W/AUTO DIFF WBC: CPT

## 2018-01-03 PROCEDURE — 92523 SPEECH SOUND LANG COMPREHEN: CPT

## 2018-01-03 PROCEDURE — 97530 THERAPEUTIC ACTIVITIES: CPT

## 2018-01-03 PROCEDURE — 94762 N-INVAS EAR/PLS OXIMTRY CONT: CPT

## 2018-01-03 PROCEDURE — 97116 GAIT TRAINING THERAPY: CPT

## 2018-01-03 PROCEDURE — 85027 COMPLETE CBC AUTOMATED: CPT

## 2018-01-03 PROCEDURE — 92610 EVALUATE SWALLOWING FUNCTION: CPT

## 2018-01-03 PROCEDURE — L8501: CPT

## 2018-01-03 PROCEDURE — 97167 OT EVAL HIGH COMPLEX 60 MIN: CPT

## 2018-01-03 PROCEDURE — 97542 WHEELCHAIR MNGMENT TRAINING: CPT

## 2018-01-03 PROCEDURE — 84100 ASSAY OF PHOSPHORUS: CPT

## 2018-01-03 PROCEDURE — 92507 TX SP LANG VOICE COMM INDIV: CPT

## 2018-01-03 PROCEDURE — 80053 COMPREHEN METABOLIC PANEL: CPT

## 2018-01-03 PROCEDURE — 36600 WITHDRAWAL OF ARTERIAL BLOOD: CPT

## 2018-01-03 PROCEDURE — 80048 BASIC METABOLIC PNL TOTAL CA: CPT

## 2018-01-03 PROCEDURE — 97535 SELF CARE MNGMENT TRAINING: CPT

## 2018-01-03 PROCEDURE — 92522 EVALUATE SPEECH PRODUCTION: CPT

## 2018-01-03 PROCEDURE — 82803 BLOOD GASES ANY COMBINATION: CPT

## 2018-01-03 PROCEDURE — 97110 THERAPEUTIC EXERCISES: CPT

## 2018-01-03 PROCEDURE — 92611 MOTION FLUOROSCOPY/SWALLOW: CPT

## 2018-01-03 PROCEDURE — 97163 PT EVAL HIGH COMPLEX 45 MIN: CPT

## 2018-01-03 PROCEDURE — 97112 NEUROMUSCULAR REEDUCATION: CPT

## 2018-01-03 PROCEDURE — 74230 X-RAY XM SWLNG FUNCJ C+: CPT

## 2018-01-03 PROCEDURE — 83735 ASSAY OF MAGNESIUM: CPT

## 2018-01-03 PROCEDURE — 92526 ORAL FUNCTION THERAPY: CPT

## 2018-01-03 PROCEDURE — 99239 HOSP IP/OBS DSCHRG MGMT >30: CPT

## 2018-01-13 DIAGNOSIS — R41.89 OTHER SYMPTOMS AND SIGNS INVOLVING COGNITIVE FUNCTIONS AND AWARENESS: ICD-10-CM

## 2018-01-13 DIAGNOSIS — G54.6 PHANTOM LIMB SYNDROME WITH PAIN: ICD-10-CM

## 2018-01-13 DIAGNOSIS — R13.10 DYSPHAGIA, UNSPECIFIED: ICD-10-CM

## 2018-01-13 DIAGNOSIS — I48.91 UNSPECIFIED ATRIAL FIBRILLATION: ICD-10-CM

## 2018-01-13 DIAGNOSIS — J44.9 CHRONIC OBSTRUCTIVE PULMONARY DISEASE, UNSPECIFIED: ICD-10-CM

## 2018-01-13 DIAGNOSIS — Z43.0 ENCOUNTER FOR ATTENTION TO TRACHEOSTOMY: ICD-10-CM

## 2018-01-13 DIAGNOSIS — J96.10 CHRONIC RESPIRATORY FAILURE, UNSPECIFIED WHETHER WITH HYPOXIA OR HYPERCAPNIA: ICD-10-CM

## 2018-01-13 DIAGNOSIS — Z51.89 ENCOUNTER FOR OTHER SPECIFIED AFTERCARE: ICD-10-CM

## 2018-01-13 DIAGNOSIS — K70.30 ALCOHOLIC CIRRHOSIS OF LIVER WITHOUT ASCITES: ICD-10-CM

## 2018-01-13 DIAGNOSIS — R45.87 IMPULSIVENESS: ICD-10-CM

## 2018-01-13 DIAGNOSIS — R26.9 UNSPECIFIED ABNORMALITIES OF GAIT AND MOBILITY: ICD-10-CM

## 2018-01-13 DIAGNOSIS — Z89.512 ACQUIRED ABSENCE OF LEFT LEG BELOW KNEE: ICD-10-CM

## 2018-01-13 DIAGNOSIS — I50.32 CHRONIC DIASTOLIC (CONGESTIVE) HEART FAILURE: ICD-10-CM

## 2018-01-13 DIAGNOSIS — I10 ESSENTIAL (PRIMARY) HYPERTENSION: ICD-10-CM

## 2018-01-13 DIAGNOSIS — F17.210 NICOTINE DEPENDENCE, CIGARETTES, UNCOMPLICATED: ICD-10-CM

## 2018-01-13 DIAGNOSIS — F10.10 ALCOHOL ABUSE, UNCOMPLICATED: ICD-10-CM

## 2018-01-13 DIAGNOSIS — Z43.1 ENCOUNTER FOR ATTENTION TO GASTROSTOMY: ICD-10-CM

## 2018-01-13 DIAGNOSIS — D63.8 ANEMIA IN OTHER CHRONIC DISEASES CLASSIFIED ELSEWHERE: ICD-10-CM

## 2018-01-19 ENCOUNTER — APPOINTMENT (OUTPATIENT)
Dept: FAMILY MEDICINE | Facility: CLINIC | Age: 63
End: 2018-01-19
Payer: MEDICARE

## 2018-01-19 VITALS
OXYGEN SATURATION: 92 % | TEMPERATURE: 97.6 F | HEART RATE: 101 BPM | WEIGHT: 237 LBS | BODY MASS INDEX: 32.1 KG/M2 | HEIGHT: 72 IN | SYSTOLIC BLOOD PRESSURE: 148 MMHG | DIASTOLIC BLOOD PRESSURE: 70 MMHG

## 2018-01-19 PROCEDURE — 99495 TRANSJ CARE MGMT MOD F2F 14D: CPT

## 2018-01-19 RX ORDER — METOPROLOL SUCCINATE 100 MG/1
100 TABLET, EXTENDED RELEASE ORAL
Qty: 60 | Refills: 1 | Status: DISCONTINUED | COMMUNITY
Start: 2017-04-20 | End: 2018-01-19

## 2018-01-19 RX ORDER — VARENICLINE TARTRATE 0.5 (11)-1
0.5 MG X 11 & KIT ORAL
Qty: 1 | Refills: 0 | Status: DISCONTINUED | COMMUNITY
Start: 2017-08-28 | End: 2018-01-19

## 2018-01-19 RX ORDER — ERGOCALCIFEROL 1.25 MG/1
1.25 MG CAPSULE, LIQUID FILLED ORAL
Qty: 12 | Refills: 0 | Status: DISCONTINUED | COMMUNITY
Start: 2017-02-05 | End: 2018-01-19

## 2018-02-05 ENCOUNTER — MEDICATION RENEWAL (OUTPATIENT)
Age: 63
End: 2018-02-05

## 2018-02-13 ENCOUNTER — CHART COPY (OUTPATIENT)
Age: 63
End: 2018-02-13

## 2018-02-16 ENCOUNTER — APPOINTMENT (OUTPATIENT)
Dept: FAMILY MEDICINE | Facility: CLINIC | Age: 63
End: 2018-02-16
Payer: MEDICARE

## 2018-02-16 VITALS
WEIGHT: 235 LBS | HEART RATE: 68 BPM | SYSTOLIC BLOOD PRESSURE: 132 MMHG | DIASTOLIC BLOOD PRESSURE: 80 MMHG | BODY MASS INDEX: 31.83 KG/M2 | TEMPERATURE: 97.8 F | OXYGEN SATURATION: 98 % | HEIGHT: 72 IN

## 2018-02-16 PROCEDURE — 36415 COLL VENOUS BLD VENIPUNCTURE: CPT

## 2018-02-16 PROCEDURE — 99214 OFFICE O/P EST MOD 30 MIN: CPT | Mod: 25

## 2018-02-18 LAB
ALBUMIN SERPL ELPH-MCNC: 3.8 G/DL
ALP BLD-CCNC: 118 U/L
ALT SERPL-CCNC: 9 U/L
ANION GAP SERPL CALC-SCNC: 13 MMOL/L
AST SERPL-CCNC: 16 U/L
BASOPHILS # BLD AUTO: 0.03 K/UL
BASOPHILS NFR BLD AUTO: 0.4 %
BILIRUB SERPL-MCNC: 0.3 MG/DL
BUN SERPL-MCNC: 20 MG/DL
CALCIUM SERPL-MCNC: 9.9 MG/DL
CHLORIDE SERPL-SCNC: 102 MMOL/L
CHOLEST SERPL-MCNC: 169 MG/DL
CHOLEST/HDLC SERPL: 4 RATIO
CO2 SERPL-SCNC: 23 MMOL/L
CREAT SERPL-MCNC: 0.94 MG/DL
DIGOXIN SERPL-MCNC: 0.4 NG/ML
EOSINOPHIL # BLD AUTO: 0.35 K/UL
EOSINOPHIL NFR BLD AUTO: 5 %
GLUCOSE SERPL-MCNC: 95 MG/DL
HCT VFR BLD CALC: 38.6 %
HDLC SERPL-MCNC: 42 MG/DL
HGB BLD-MCNC: 12.8 G/DL
IMM GRANULOCYTES NFR BLD AUTO: 0.1 %
LDLC SERPL CALC-MCNC: 108 MG/DL
LYMPHOCYTES # BLD AUTO: 1.82 K/UL
LYMPHOCYTES NFR BLD AUTO: 26.1 %
MAN DIFF?: NORMAL
MCHC RBC-ENTMCNC: 31.1 PG
MCHC RBC-ENTMCNC: 33.2 GM/DL
MCV RBC AUTO: 93.9 FL
MONOCYTES # BLD AUTO: 0.53 K/UL
MONOCYTES NFR BLD AUTO: 7.6 %
NEUTROPHILS # BLD AUTO: 4.23 K/UL
NEUTROPHILS NFR BLD AUTO: 60.8 %
PLATELET # BLD AUTO: 170 K/UL
POTASSIUM SERPL-SCNC: 4.9 MMOL/L
PROT SERPL-MCNC: 7.2 G/DL
RBC # BLD: 4.11 M/UL
RBC # FLD: 13.1 %
SODIUM SERPL-SCNC: 138 MMOL/L
TRIGL SERPL-MCNC: 93 MG/DL
WBC # FLD AUTO: 6.97 K/UL

## 2018-03-01 ENCOUNTER — APPOINTMENT (OUTPATIENT)
Dept: ELECTROPHYSIOLOGY | Facility: CLINIC | Age: 63
End: 2018-03-01
Payer: MEDICARE

## 2018-03-01 ENCOUNTER — NON-APPOINTMENT (OUTPATIENT)
Age: 63
End: 2018-03-01

## 2018-03-01 VITALS
SYSTOLIC BLOOD PRESSURE: 109 MMHG | WEIGHT: 228 LBS | BODY MASS INDEX: 30.88 KG/M2 | HEIGHT: 72 IN | DIASTOLIC BLOOD PRESSURE: 56 MMHG | OXYGEN SATURATION: 94 % | HEART RATE: 55 BPM

## 2018-03-01 PROCEDURE — 99215 OFFICE O/P EST HI 40 MIN: CPT

## 2018-03-01 PROCEDURE — 93000 ELECTROCARDIOGRAM COMPLETE: CPT

## 2018-03-01 RX ORDER — DIGOXIN 125 UG/1
125 TABLET ORAL DAILY
Qty: 30 | Refills: 0 | Status: DISCONTINUED | COMMUNITY
Start: 2018-02-05 | End: 2018-03-01

## 2018-04-12 ENCOUNTER — APPOINTMENT (OUTPATIENT)
Dept: FAMILY MEDICINE | Facility: CLINIC | Age: 63
End: 2018-04-12
Payer: MEDICARE

## 2018-04-12 VITALS
HEIGHT: 72 IN | SYSTOLIC BLOOD PRESSURE: 120 MMHG | OXYGEN SATURATION: 95 % | BODY MASS INDEX: 32.23 KG/M2 | HEART RATE: 94 BPM | WEIGHT: 238 LBS | DIASTOLIC BLOOD PRESSURE: 68 MMHG

## 2018-04-12 DIAGNOSIS — H53.9 UNSPECIFIED VISUAL DISTURBANCE: ICD-10-CM

## 2018-04-12 PROCEDURE — 99214 OFFICE O/P EST MOD 30 MIN: CPT

## 2018-04-19 ENCOUNTER — APPOINTMENT (OUTPATIENT)
Dept: OPHTHALMOLOGY | Facility: CLINIC | Age: 63
End: 2018-04-19
Payer: MEDICARE

## 2018-04-19 DIAGNOSIS — H25.13 AGE-RELATED NUCLEAR CATARACT, BILATERAL: ICD-10-CM

## 2018-04-19 PROCEDURE — 92004 COMPRE OPH EXAM NEW PT 1/>: CPT

## 2018-04-20 ENCOUNTER — APPOINTMENT (OUTPATIENT)
Dept: PULMONOLOGY | Facility: CLINIC | Age: 63
End: 2018-04-20
Payer: MEDICARE

## 2018-04-20 VITALS
OXYGEN SATURATION: 95 % | HEIGHT: 74 IN | DIASTOLIC BLOOD PRESSURE: 72 MMHG | SYSTOLIC BLOOD PRESSURE: 122 MMHG | HEART RATE: 82 BPM | TEMPERATURE: 98.1 F | RESPIRATION RATE: 18 BRPM | BODY MASS INDEX: 30.54 KG/M2 | WEIGHT: 238 LBS

## 2018-04-20 PROCEDURE — 94010 BREATHING CAPACITY TEST: CPT

## 2018-04-20 PROCEDURE — 99214 OFFICE O/P EST MOD 30 MIN: CPT | Mod: 25

## 2018-04-20 PROCEDURE — 99204 OFFICE O/P NEW MOD 45 MIN: CPT | Mod: 25

## 2018-04-22 PROBLEM — H53.9 VISUAL DISTURBANCE: Status: ACTIVE | Noted: 2018-04-22

## 2018-04-30 ENCOUNTER — OTHER (OUTPATIENT)
Age: 63
End: 2018-04-30

## 2018-05-08 ENCOUNTER — RX RENEWAL (OUTPATIENT)
Age: 63
End: 2018-05-08

## 2018-05-15 ENCOUNTER — RX RENEWAL (OUTPATIENT)
Age: 63
End: 2018-05-15

## 2018-06-14 ENCOUNTER — APPOINTMENT (OUTPATIENT)
Dept: ELECTROPHYSIOLOGY | Facility: CLINIC | Age: 63
End: 2018-06-14

## 2018-06-15 NOTE — BRIEF OPERATIVE NOTE - PROCEDURE POST
<<-----Click on this checkbox to enter Post-Op Dx
<<-----Click on this checkbox to enter Post-Op Dx
Aunt  Still living? Unknown  Family history of bipolar disorder, Age at diagnosis: Age Unknown

## 2018-07-05 ENCOUNTER — RX RENEWAL (OUTPATIENT)
Age: 63
End: 2018-07-05

## 2018-07-23 ENCOUNTER — RX RENEWAL (OUTPATIENT)
Age: 63
End: 2018-07-23

## 2018-07-28 PROBLEM — Z13.1 SCREENING FOR DIABETES MELLITUS: Status: ACTIVE | Noted: 2017-02-03

## 2018-08-27 ENCOUNTER — RX RENEWAL (OUTPATIENT)
Age: 63
End: 2018-08-27

## 2018-08-29 ENCOUNTER — RX RENEWAL (OUTPATIENT)
Age: 63
End: 2018-08-29

## 2018-08-29 ENCOUNTER — APPOINTMENT (OUTPATIENT)
Dept: FAMILY MEDICINE | Facility: CLINIC | Age: 63
End: 2018-08-29
Payer: MEDICARE

## 2018-08-29 VITALS
HEIGHT: 74 IN | DIASTOLIC BLOOD PRESSURE: 74 MMHG | WEIGHT: 250 LBS | OXYGEN SATURATION: 94 % | SYSTOLIC BLOOD PRESSURE: 122 MMHG | BODY MASS INDEX: 32.08 KG/M2 | HEART RATE: 64 BPM

## 2018-08-29 DIAGNOSIS — G89.29 OTHER CHRONIC PAIN: ICD-10-CM

## 2018-08-29 DIAGNOSIS — R73.09 OTHER ABNORMAL GLUCOSE: ICD-10-CM

## 2018-08-29 DIAGNOSIS — E55.9 VITAMIN D DEFICIENCY, UNSPECIFIED: ICD-10-CM

## 2018-08-29 PROBLEM — I48.2 CHRONIC ATRIAL FIBRILLATION: Chronic | Status: ACTIVE | Noted: 2017-08-17

## 2018-08-29 PROBLEM — K74.60 UNSPECIFIED CIRRHOSIS OF LIVER: Chronic | Status: ACTIVE | Noted: 2017-10-28

## 2018-08-29 PROBLEM — I50.9 HEART FAILURE, UNSPECIFIED: Chronic | Status: ACTIVE | Noted: 2017-10-28
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
## 2018-08-29 PROBLEM — F10.10 ALCOHOL ABUSE, UNCOMPLICATED: Chronic | Status: ACTIVE | Noted: 2017-08-17

## 2018-08-29 PROBLEM — G03.9 MENINGITIS, UNSPECIFIED: Chronic | Status: ACTIVE | Noted: 2017-10-28

## 2018-08-29 PROBLEM — J98.19 OTHER PULMONARY COLLAPSE: Chronic | Status: ACTIVE | Noted: 2017-10-28

## 2018-08-29 PROBLEM — J43.9 EMPHYSEMA, UNSPECIFIED: Chronic | Status: ACTIVE | Noted: 2017-10-28

## 2018-08-29 PROBLEM — F10.239 ALCOHOL DEPENDENCE WITH WITHDRAWAL, UNSPECIFIED: Chronic | Status: ACTIVE | Noted: 2017-10-28

## 2018-08-29 PROCEDURE — 36415 COLL VENOUS BLD VENIPUNCTURE: CPT

## 2018-08-29 PROCEDURE — 99214 OFFICE O/P EST MOD 30 MIN: CPT | Mod: 25

## 2018-08-29 RX ORDER — ASPIRIN 81 MG/1
81 TABLET, COATED ORAL
Qty: 90 | Refills: 0 | Status: COMPLETED | COMMUNITY
Start: 2018-05-15 | End: 2018-08-29

## 2018-08-29 RX ORDER — ALBUTEROL SULFATE 90 UG/1
108 (90 BASE) AEROSOL, METERED RESPIRATORY (INHALATION)
Qty: 1 | Refills: 4 | Status: COMPLETED | COMMUNITY
Start: 2018-04-20 | End: 2018-08-29

## 2018-08-29 RX ORDER — DILTIAZEM HYDROCHLORIDE 240 MG/1
240 CAPSULE, EXTENDED RELEASE ORAL
Qty: 90 | Refills: 3 | Status: COMPLETED | COMMUNITY
Start: 2018-03-01 | End: 2018-08-29

## 2018-08-29 NOTE — PHYSICAL EXAM
[No Acute Distress] : no acute distress [Well Nourished] : well nourished [Well Developed] : well developed [EOMI] : extraocular movements intact [Normal Outer Ear/Nose] : the outer ears and nose were normal in appearance [Supple] : supple [No Respiratory Distress] : no respiratory distress  [Clear to Auscultation] : lungs were clear to auscultation bilaterally [No Accessory Muscle Use] : no accessory muscle use [Non-distended] : non-distended [No CVA Tenderness] : no CVA  tenderness [No Rash] : no rash [Coordination Grossly Intact] : coordination grossly intact [Normal Affect] : the affect was normal [Normal Mood] : the mood was normal [Normal Insight/Judgement] : insight and judgment were intact [de-identified] : RRR at this time +murmur  [de-identified] : RLE w/o edema  BKA LLE  [de-identified] : BRANDON BRANDT  [de-identified] : B

## 2018-08-29 NOTE — ASSESSMENT
[FreeTextEntry1] : Trial of increasing Gabapentin 300mg q8h to 400mg q8h \par see med orders\par \par see lab orders

## 2018-08-29 NOTE — HISTORY OF PRESENT ILLNESS
[FreeTextEntry1] : follow up on Vit D/a fib/ Insomnia  [de-identified] : 62 yo male c hx of COPD/CHF/AFib presents for f/u.  pt states quit smoking 2 days ago!!!!! Denies CP improved SOB.   \par \par pt currently on Cartia XT 240mg qd ,  pt denies palpitations and/or dizziness \par HR today if office 56-64 bpm.     \par \par pt sole c/o persisting "phantom pain" despite Gabapentin 300mg tid

## 2018-09-07 ENCOUNTER — RX RENEWAL (OUTPATIENT)
Age: 63
End: 2018-09-07

## 2018-09-10 ENCOUNTER — APPOINTMENT (OUTPATIENT)
Dept: FAMILY MEDICINE | Facility: CLINIC | Age: 63
End: 2018-09-10

## 2018-09-11 LAB
25(OH)D3 SERPL-MCNC: 34.1 NG/ML
ALBUMIN SERPL ELPH-MCNC: 3.9 G/DL
ALP BLD-CCNC: 109 U/L
ALT SERPL-CCNC: 7 U/L
ANION GAP SERPL CALC-SCNC: 13 MMOL/L
AST SERPL-CCNC: 16 U/L
BASOPHILS # BLD AUTO: 0.02 K/UL
BASOPHILS NFR BLD AUTO: 0.3 %
BILIRUB SERPL-MCNC: 1.6 MG/DL
BUN SERPL-MCNC: 16 MG/DL
CALCIUM SERPL-MCNC: 9 MG/DL
CHLORIDE SERPL-SCNC: 102 MMOL/L
CHOLEST SERPL-MCNC: 153 MG/DL
CHOLEST/HDLC SERPL: 3.3 RATIO
CK SERPL-CCNC: 64 U/L
CO2 SERPL-SCNC: 26 MMOL/L
CREAT SERPL-MCNC: 1 MG/DL
DIGOXIN SERPL-MCNC: <0.3 NG/ML
EOSINOPHIL # BLD AUTO: 0.24 K/UL
EOSINOPHIL NFR BLD AUTO: 3.6 %
GGT SERPL-CCNC: 57 U/L
GLUCOSE SERPL-MCNC: 98 MG/DL
HBA1C MFR BLD HPLC: 5.4 %
HCT VFR BLD CALC: 34.9 %
HDLC SERPL-MCNC: 46 MG/DL
HGB BLD-MCNC: 10.9 G/DL
IMM GRANULOCYTES NFR BLD AUTO: 0.3 %
LDLC SERPL CALC-MCNC: 95 MG/DL
LYMPHOCYTES # BLD AUTO: 1.05 K/UL
LYMPHOCYTES NFR BLD AUTO: 15.8 %
MAN DIFF?: NORMAL
MCHC RBC-ENTMCNC: 30.2 PG
MCHC RBC-ENTMCNC: 31.2 GM/DL
MCV RBC AUTO: 96.7 FL
MONOCYTES # BLD AUTO: 0.52 K/UL
MONOCYTES NFR BLD AUTO: 7.8 %
NEUTROPHILS # BLD AUTO: 4.8 K/UL
NEUTROPHILS NFR BLD AUTO: 72.2 %
PLATELET # BLD AUTO: 137 K/UL
POTASSIUM SERPL-SCNC: 4.2 MMOL/L
PROT SERPL-MCNC: 6.5 G/DL
RBC # BLD: 3.61 M/UL
RBC # FLD: 16.5 %
SODIUM SERPL-SCNC: 141 MMOL/L
T3 SERPL-MCNC: 97 NG/DL
T4 SERPL-MCNC: 6.6 UG/DL
TRIGL SERPL-MCNC: 58 MG/DL
TSH SERPL-ACNC: 1.9 UIU/ML
URATE SERPL-MCNC: 5.1 MG/DL
WBC # FLD AUTO: 6.65 K/UL

## 2018-09-12 ENCOUNTER — RX RENEWAL (OUTPATIENT)
Age: 63
End: 2018-09-12

## 2018-09-13 ENCOUNTER — RX RENEWAL (OUTPATIENT)
Age: 63
End: 2018-09-13

## 2018-09-14 ENCOUNTER — RX RENEWAL (OUTPATIENT)
Age: 63
End: 2018-09-14

## 2018-09-18 ENCOUNTER — MEDICATION RENEWAL (OUTPATIENT)
Age: 63
End: 2018-09-18

## 2018-09-23 ENCOUNTER — RX RENEWAL (OUTPATIENT)
Age: 63
End: 2018-09-23

## 2018-09-26 ENCOUNTER — RX RENEWAL (OUTPATIENT)
Age: 63
End: 2018-09-26

## 2018-09-27 ENCOUNTER — APPOINTMENT (OUTPATIENT)
Dept: ELECTROPHYSIOLOGY | Facility: CLINIC | Age: 63
End: 2018-09-27
Payer: MEDICARE

## 2018-09-27 VITALS
WEIGHT: 258 LBS | HEIGHT: 74 IN | HEART RATE: 83 BPM | BODY MASS INDEX: 33.11 KG/M2 | SYSTOLIC BLOOD PRESSURE: 106 MMHG | OXYGEN SATURATION: 91 % | DIASTOLIC BLOOD PRESSURE: 58 MMHG

## 2018-09-27 PROCEDURE — 93000 ELECTROCARDIOGRAM COMPLETE: CPT

## 2018-09-27 PROCEDURE — 99215 OFFICE O/P EST HI 40 MIN: CPT

## 2018-09-27 RX ORDER — METOPROLOL TARTRATE 25 MG/1
25 TABLET, FILM COATED ORAL
Qty: 270 | Refills: 0 | Status: DISCONTINUED | COMMUNITY
Start: 2018-02-05 | End: 2018-09-27

## 2018-09-27 RX ORDER — ASPIRIN 81 MG/1
81 TABLET ORAL DAILY
Qty: 90 | Refills: 3 | Status: DISCONTINUED | COMMUNITY
Start: 2018-02-05 | End: 2018-09-27

## 2018-09-28 ENCOUNTER — RX RENEWAL (OUTPATIENT)
Age: 63
End: 2018-09-28

## 2018-10-01 ENCOUNTER — APPOINTMENT (OUTPATIENT)
Dept: FAMILY MEDICINE | Facility: CLINIC | Age: 63
End: 2018-10-01
Payer: MEDICARE

## 2018-10-01 VITALS
BODY MASS INDEX: 33.5 KG/M2 | HEART RATE: 72 BPM | WEIGHT: 261 LBS | HEIGHT: 74 IN | SYSTOLIC BLOOD PRESSURE: 104 MMHG | OXYGEN SATURATION: 93 % | DIASTOLIC BLOOD PRESSURE: 64 MMHG

## 2018-10-01 PROCEDURE — 90686 IIV4 VACC NO PRSV 0.5 ML IM: CPT

## 2018-10-01 PROCEDURE — 90471 IMMUNIZATION ADMIN: CPT

## 2018-10-01 PROCEDURE — 99214 OFFICE O/P EST MOD 30 MIN: CPT | Mod: 25

## 2018-10-01 PROCEDURE — 36415 COLL VENOUS BLD VENIPUNCTURE: CPT

## 2018-10-01 RX ORDER — OLANZAPINE 10 MG/1
10 TABLET, FILM COATED ORAL TWICE DAILY
Qty: 60 | Refills: 0 | Status: DISCONTINUED | COMMUNITY
Start: 2018-02-05 | End: 2018-10-01

## 2018-10-01 RX ORDER — DILTIAZEM HYDROCHLORIDE 60 MG/1
60 TABLET ORAL EVERY 6 HOURS
Qty: 360 | Refills: 0 | Status: DISCONTINUED | COMMUNITY
Start: 2018-02-05 | End: 2018-10-01

## 2018-10-02 ENCOUNTER — OTHER (OUTPATIENT)
Age: 63
End: 2018-10-02

## 2018-10-02 NOTE — HISTORY OF PRESENT ILLNESS
[FreeTextEntry1] : follow up on decreased H/H  [de-identified] : patient is here for follow up with his daughter\par cardio note form recent visit reviewed\par patient is back on anticoagulation (although admits to continued alcohol consumption) at least 2 days a week and multiple drinks on those days\par patient has been taking diuretic\par he states he feels his legs are less swollen\par he denies SOB at this time\par his daughters are tending to his health care needs as much as possible and are keeping his meds current and supplied\par patient offers no new complaints today

## 2018-10-02 NOTE — PHYSICAL EXAM

## 2018-10-02 NOTE — PLAN
[FreeTextEntry1] : discussed high risk of drinking on anticoagulant and high risk of fall/bleed\par \par patient not reliable to stay off alcohol\par \par referred to general cardiology (dr. Daley) for updated echo\par \par recommend patient speak to Dr. Clark again regarding watchman procedure, as patient is very high risk of bleed on chronic anticoagulation

## 2018-10-09 ENCOUNTER — FORM ENCOUNTER (OUTPATIENT)
Age: 63
End: 2018-10-09

## 2018-10-10 ENCOUNTER — APPOINTMENT (OUTPATIENT)
Dept: RADIOLOGY | Facility: CLINIC | Age: 63
End: 2018-10-10
Payer: MEDICARE

## 2018-10-10 ENCOUNTER — OUTPATIENT (OUTPATIENT)
Dept: OUTPATIENT SERVICES | Facility: HOSPITAL | Age: 63
LOS: 1 days | End: 2018-10-10
Payer: MEDICARE

## 2018-10-10 DIAGNOSIS — Z89.512 ACQUIRED ABSENCE OF LEFT LEG BELOW KNEE: Chronic | ICD-10-CM

## 2018-10-10 DIAGNOSIS — Z00.8 ENCOUNTER FOR OTHER GENERAL EXAMINATION: ICD-10-CM

## 2018-10-10 PROCEDURE — 71046 X-RAY EXAM CHEST 2 VIEWS: CPT | Mod: 26

## 2018-10-10 PROCEDURE — 71046 X-RAY EXAM CHEST 2 VIEWS: CPT

## 2018-10-29 ENCOUNTER — APPOINTMENT (OUTPATIENT)
Dept: CARDIOLOGY | Facility: CLINIC | Age: 63
End: 2018-10-29
Payer: MEDICARE

## 2018-10-29 ENCOUNTER — NON-APPOINTMENT (OUTPATIENT)
Age: 63
End: 2018-10-29

## 2018-10-29 VITALS
DIASTOLIC BLOOD PRESSURE: 76 MMHG | WEIGHT: 258 LBS | HEART RATE: 84 BPM | SYSTOLIC BLOOD PRESSURE: 130 MMHG | OXYGEN SATURATION: 92 % | BODY MASS INDEX: 33.11 KG/M2 | HEIGHT: 74 IN
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
## 2018-10-29 PROCEDURE — 99214 OFFICE O/P EST MOD 30 MIN: CPT | Mod: 25

## 2018-10-29 PROCEDURE — 99406 BEHAV CHNG SMOKING 3-10 MIN: CPT

## 2018-10-29 PROCEDURE — 93308 TTE F-UP OR LMTD: CPT

## 2018-10-29 PROCEDURE — 93000 ELECTROCARDIOGRAM COMPLETE: CPT

## 2018-10-29 PROCEDURE — 93321 DOPPLER ECHO F-UP/LMTD STD: CPT

## 2018-11-13 ENCOUNTER — OTHER (OUTPATIENT)
Age: 63
End: 2018-11-13

## 2018-11-13 ENCOUNTER — RESULT CHARGE (OUTPATIENT)
Age: 63
End: 2018-11-13

## 2018-11-14 NOTE — PROGRESS NOTE BEHAVIORAL HEALTH - ORIENTED TO TIME
I will START or STAY ON the medications listed below when I get home from the hospital:    aspirin 81 mg oral tablet  -- 1 tab(s) by mouth once a day  -- Indication: For Prophylaxis    acetaminophen-codeine 300 mg-30 mg oral tablet  -- 1 tab(s) by mouth every 6 hours, As needed, Moderate Pain (4 - 6) MDD:4  -- Indication: For Moderate pain    atenolol 25 mg oral tablet  -- 1 tab(s) by mouth once a day  -- Indication: For HTN
No
Yes

## 2018-11-19 LAB
ALBUMIN SERPL ELPH-MCNC: 3.7 G/DL
ALP BLD-CCNC: 136 U/L
ALT SERPL-CCNC: 10 U/L
ANION GAP SERPL CALC-SCNC: 15 MMOL/L
AST SERPL-CCNC: 15 U/L
BASOPHILS # BLD AUTO: 0.03 K/UL
BASOPHILS NFR BLD AUTO: 0.4 %
BILIRUB SERPL-MCNC: 0.5 MG/DL
BUN SERPL-MCNC: 11 MG/DL
CALCIUM SERPL-MCNC: 8.9 MG/DL
CHLORIDE SERPL-SCNC: 102 MMOL/L
CO2 SERPL-SCNC: 24 MMOL/L
CREAT SERPL-MCNC: 0.72 MG/DL
EOSINOPHIL # BLD AUTO: 0.32 K/UL
EOSINOPHIL NFR BLD AUTO: 4.5 %
GLUCOSE SERPL-MCNC: 91 MG/DL
HCT VFR BLD CALC: 33.6 %
HGB BLD-MCNC: 10.6 G/DL
IMM GRANULOCYTES NFR BLD AUTO: 0.3 %
LYMPHOCYTES # BLD AUTO: 1.36 K/UL
LYMPHOCYTES NFR BLD AUTO: 19.2 %
MAN DIFF?: NORMAL
MCHC RBC-ENTMCNC: 29.7 PG
MCHC RBC-ENTMCNC: 31.5 GM/DL
MCV RBC AUTO: 94.1 FL
MONOCYTES # BLD AUTO: 0.73 K/UL
MONOCYTES NFR BLD AUTO: 10.3 %
NEUTROPHILS # BLD AUTO: 4.64 K/UL
NEUTROPHILS NFR BLD AUTO: 65.3 %
NT-PROBNP SERPL-MCNC: 1240 PG/ML
PLATELET # BLD AUTO: 178 K/UL
POTASSIUM SERPL-SCNC: 4.2 MMOL/L
PROT SERPL-MCNC: 6.7 G/DL
RBC # BLD: 3.57 M/UL
RBC # FLD: 16 %
SODIUM SERPL-SCNC: 141 MMOL/L
WBC # FLD AUTO: 7.1 K/UL

## 2018-11-20 ENCOUNTER — NON-APPOINTMENT (OUTPATIENT)
Age: 63
End: 2018-11-20
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
## 2018-11-21 ENCOUNTER — APPOINTMENT (OUTPATIENT)
Dept: FAMILY MEDICINE | Facility: CLINIC | Age: 63
End: 2018-11-21

## 2018-11-28 ENCOUNTER — APPOINTMENT (OUTPATIENT)
Dept: CARDIOLOGY | Facility: CLINIC | Age: 63
End: 2018-11-28

## 2018-12-03 ENCOUNTER — APPOINTMENT (OUTPATIENT)
Dept: FAMILY MEDICINE | Facility: CLINIC | Age: 63
End: 2018-12-03
Payer: MEDICARE

## 2018-12-03 VITALS
HEART RATE: 88 BPM | WEIGHT: 255 LBS | SYSTOLIC BLOOD PRESSURE: 128 MMHG | OXYGEN SATURATION: 92 % | HEIGHT: 74 IN | DIASTOLIC BLOOD PRESSURE: 76 MMHG | BODY MASS INDEX: 32.73 KG/M2

## 2018-12-03 PROCEDURE — 36415 COLL VENOUS BLD VENIPUNCTURE: CPT

## 2018-12-03 PROCEDURE — 99214 OFFICE O/P EST MOD 30 MIN: CPT | Mod: 25

## 2018-12-03 RX ORDER — RIVAROXABAN 20 MG/1
20 TABLET, FILM COATED ORAL
Qty: 30 | Refills: 3 | Status: COMPLETED | COMMUNITY
Start: 2018-09-27 | End: 2018-12-03

## 2018-12-04 LAB
ALBUMIN SERPL ELPH-MCNC: 3.8 G/DL
ALP BLD-CCNC: 149 U/L
ALT SERPL-CCNC: 17 U/L
ANION GAP SERPL CALC-SCNC: 15 MMOL/L
AST SERPL-CCNC: 18 U/L
BILIRUB SERPL-MCNC: 0.2 MG/DL
BUN SERPL-MCNC: 10 MG/DL
CALCIUM SERPL-MCNC: 8.7 MG/DL
CHLORIDE SERPL-SCNC: 104 MMOL/L
CO2 SERPL-SCNC: 22 MMOL/L
CREAT SERPL-MCNC: 0.7 MG/DL
FERRITIN SERPL-MCNC: 87 NG/ML
GLUCOSE SERPL-MCNC: 97 MG/DL
IRON SERPL-MCNC: 32 UG/DL
NT-PROBNP SERPL-MCNC: 766 PG/ML
POTASSIUM SERPL-SCNC: 4 MMOL/L
PROT SERPL-MCNC: 6.7 G/DL
SODIUM SERPL-SCNC: 141 MMOL/L

## 2018-12-04 NOTE — HISTORY OF PRESENT ILLNESS
[FreeTextEntry1] : follow up on anemia/afib [de-identified] : patient is here with daughter for follow up\par patient admits to still drinking and smoking\par he saw dr casarez and it was agreed to take him off xarelto due to fall rsik\par he is still on full dose aspirin\par heis daughter states he is "mostly compliant with medications" except if he has been drinking\par

## 2018-12-04 NOTE — ASSESSMENT
[FreeTextEntry1] : check cbc\par may need endoscopy to rule out bleed\par \par reiterate need for patient to stop etoh use but patient not motivated\par \par \par has follow up appointment scheduled with dr guillen next month

## 2018-12-05 LAB
BASOPHILS # BLD AUTO: 0.05 K/UL
BASOPHILS NFR BLD AUTO: 1 %
EOSINOPHIL # BLD AUTO: 0.3 K/UL
EOSINOPHIL NFR BLD AUTO: 5.7 %
HCT VFR BLD CALC: 38.2 %
HGB BLD-MCNC: 11.8 G/DL
IMM GRANULOCYTES NFR BLD AUTO: 0.2 %
LYMPHOCYTES # BLD AUTO: 1.86 K/UL
LYMPHOCYTES NFR BLD AUTO: 35.6 %
MAN DIFF?: NORMAL
MCHC RBC-ENTMCNC: 29 PG
MCHC RBC-ENTMCNC: 30.9 GM/DL
MCV RBC AUTO: 93.9 FL
MONOCYTES # BLD AUTO: 0.48 K/UL
MONOCYTES NFR BLD AUTO: 9.2 %
NEUTROPHILS # BLD AUTO: 2.52 K/UL
NEUTROPHILS NFR BLD AUTO: 48.3 %
PLATELET # BLD AUTO: 253 K/UL
RBC # BLD: 4.07 M/UL
RBC # FLD: 16.3 %
WBC # FLD AUTO: 5.22 K/UL

## 2018-12-07 ENCOUNTER — RX RENEWAL (OUTPATIENT)
Age: 63
End: 2018-12-07

## 2018-12-12 ENCOUNTER — APPOINTMENT (OUTPATIENT)
Dept: CARDIOLOGY | Facility: CLINIC | Age: 63
End: 2018-12-12
Payer: MEDICARE

## 2018-12-12 PROCEDURE — 93017 CV STRESS TEST TRACING ONLY: CPT

## 2018-12-12 PROCEDURE — 93018 CV STRESS TEST I&R ONLY: CPT

## 2018-12-12 PROCEDURE — A9500: CPT

## 2018-12-12 PROCEDURE — 78452 HT MUSCLE IMAGE SPECT MULT: CPT

## 2018-12-14 ENCOUNTER — OTHER (OUTPATIENT)
Age: 63
End: 2018-12-14

## 2018-12-27 NOTE — ED PROVIDER NOTE - NS_ATTENDINGSCRIBE_ED_ALL_ED
color consistent with ethnicity/race
I personally performed the service described in the documentation recorded by the scribe in my presence, and it accurately and completely records my words and actions.

## 2018-12-31 ENCOUNTER — RX RENEWAL (OUTPATIENT)
Age: 63
End: 2018-12-31

## 2019-01-07 ENCOUNTER — APPOINTMENT (OUTPATIENT)
Dept: FAMILY MEDICINE | Facility: CLINIC | Age: 64
End: 2019-01-07
Payer: MEDICARE

## 2019-01-07 VITALS
OXYGEN SATURATION: 93 % | SYSTOLIC BLOOD PRESSURE: 108 MMHG | DIASTOLIC BLOOD PRESSURE: 64 MMHG | HEIGHT: 74 IN | WEIGHT: 249 LBS | BODY MASS INDEX: 31.95 KG/M2 | HEART RATE: 88 BPM

## 2019-01-07 PROCEDURE — 99214 OFFICE O/P EST MOD 30 MIN: CPT
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
## 2019-01-22 ENCOUNTER — RX RENEWAL (OUTPATIENT)
Age: 64
End: 2019-01-22

## 2019-01-28 ENCOUNTER — APPOINTMENT (OUTPATIENT)
Dept: PULMONOLOGY | Facility: CLINIC | Age: 64
End: 2019-01-28
Payer: MEDICARE

## 2019-01-28 VITALS
TEMPERATURE: 98.4 F | DIASTOLIC BLOOD PRESSURE: 78 MMHG | HEART RATE: 114 BPM | SYSTOLIC BLOOD PRESSURE: 118 MMHG | OXYGEN SATURATION: 91 %

## 2019-01-28 PROCEDURE — 99214 OFFICE O/P EST MOD 30 MIN: CPT

## 2019-01-28 NOTE — PHYSICAL EXAM
[General Appearance - Well Developed] : well developed [Normal Appearance] : normal appearance [Well Groomed] : well groomed [General Appearance - Well Nourished] : well nourished [No Deformities] : no deformities [General Appearance - In No Acute Distress] : no acute distress [Normal Conjunctiva] : the conjunctiva exhibited no abnormalities [Eyelids - No Xanthelasma] : the eyelids demonstrated no xanthelasmas [Normal Oropharynx] : normal oropharynx [Neck Appearance] : the appearance of the neck was normal [Neck Cervical Mass (___cm)] : no neck mass was observed [Jugular Venous Distention Increased] : there was no jugular-venous distention [Thyroid Diffuse Enlargement] : the thyroid was not enlarged [Thyroid Nodule] : there were no palpable thyroid nodules [Heart Rate And Rhythm] : heart rate and rhythm were normal [Heart Sounds] : normal S1 and S2 [Murmurs] : no murmurs present [Respiration, Rhythm And Depth] : normal respiratory rhythm and effort [Exaggerated Use Of Accessory Muscles For Inspiration] : no accessory muscle use [Auscultation Breath Sounds / Voice Sounds] : lungs were clear to auscultation bilaterally [Abdomen Soft] : soft [Abdomen Tenderness] : non-tender [] : no hepato-splenomegaly [Abdomen Mass (___ Cm)] : no abdominal mass palpated [Abnormal Walk] : normal gait [Gait - Sufficient For Exercise Testing] : the gait was sufficient for exercise testing [Deep Tendon Reflexes (DTR)] : deep tendon reflexes were 2+ and symmetric [Sensation] : the sensory exam was normal to light touch and pinprick [No Focal Deficits] : no focal deficits [Oriented To Time, Place, And Person] : oriented to person, place, and time [Impaired Insight] : insight and judgment were intact [Affect] : the affect was normal [FreeTextEntry1] : s/p left BKA

## 2019-01-28 NOTE — HISTORY OF PRESENT ILLNESS
[FreeTextEntry1] : Patient is a 63-year-old gentleman unfortunately a persistent smoker and a persistent drinker who has COPD\par \par He is still smoking as much as a pack a day\par The patient takes Spiriva once a day albuterol via nebulization at least once a day and occasionally uses albuterol MDI\par \par Respiratory dysfunction does not appear to be a major part of his problem at this time however

## 2019-01-28 NOTE — ASSESSMENT
[FreeTextEntry1] : 63-year-old gentleman with persistent smoking and COPD\par He is on a regimen of Spiriva once a day and p.r.n. use of albuterol via nebs or MDI\par \par Patient has a history of atrial fibrillation and is currently treated with only aspirin due to continued worries about fall risk due to alcohol abuse\par \par I renewed his medications\par Continue to recommend smoking cessation\par We'll see the patient again in 3 months

## 2019-01-30 ENCOUNTER — RX RENEWAL (OUTPATIENT)
Age: 64
End: 2019-01-30

## 2019-02-11 ENCOUNTER — APPOINTMENT (OUTPATIENT)
Dept: FAMILY MEDICINE | Facility: CLINIC | Age: 64
End: 2019-02-11
Payer: MEDICARE

## 2019-02-11 VITALS
OXYGEN SATURATION: 91 % | HEART RATE: 102 BPM | HEIGHT: 74 IN | DIASTOLIC BLOOD PRESSURE: 70 MMHG | WEIGHT: 254 LBS | SYSTOLIC BLOOD PRESSURE: 110 MMHG | BODY MASS INDEX: 32.6 KG/M2

## 2019-02-11 PROCEDURE — 99214 OFFICE O/P EST MOD 30 MIN: CPT

## 2019-02-11 RX ORDER — BUDESONIDE 0.5 MG/2ML
0.5 INHALANT ORAL TWICE DAILY
Qty: 1 | Refills: 2 | Status: COMPLETED | COMMUNITY
Start: 2019-01-07 | End: 2019-02-11

## 2019-02-11 RX ORDER — GABAPENTIN 300 MG/1
300 CAPSULE ORAL
Qty: 90 | Refills: 0 | Status: COMPLETED | COMMUNITY
Start: 2018-02-05 | End: 2019-02-11

## 2019-02-11 NOTE — ASSESSMENT
[FreeTextEntry1] : encourage smoking cessation\par recommend patient continue to use patch to help with nicotine withdrawal but counseled the patient must not smoke if patch is on\par \par \par patient is to schedule follow up with Dr. Daley\par \par as far as tooth extraction\par patients health conditions are stable at present time and there is no absolute contraindication to procedure.\par \par If patient has to be off aspirin for procedure that must be cleared by cardiology\par

## 2019-02-11 NOTE — HISTORY OF PRESENT ILLNESS
[FreeTextEntry1] : follow up on afib/insomnia  [de-identified] : patient here for routine follow up\par since his last visit he was seen by pulmonary and had breathing medications renewed\par he is trying to stop smoking and states he has not had a cigarette in 5 days--patient states it was time to quit\par per daughter he has been more anxious than usual and so she put a nicotine patch on him today\par (21 mcg)\par per daughter patient needs to get 6 teeth extracted and oral surgeon is requiring medical and cardiac clearance\par the date of extraction has not yet been scheduled\par patient saw Dr Daley in october and did have a stress test (negative)\par he is due to follow up with her now\par

## 2019-02-14 ENCOUNTER — APPOINTMENT (OUTPATIENT)
Dept: ELECTROPHYSIOLOGY | Facility: CLINIC | Age: 64
End: 2019-02-14
Payer: MEDICARE

## 2019-02-14 VITALS
OXYGEN SATURATION: 96 % | SYSTOLIC BLOOD PRESSURE: 119 MMHG | HEIGHT: 72 IN | HEART RATE: 60 BPM | BODY MASS INDEX: 33.59 KG/M2 | DIASTOLIC BLOOD PRESSURE: 68 MMHG | WEIGHT: 248 LBS

## 2019-02-14 PROCEDURE — 93000 ELECTROCARDIOGRAM COMPLETE: CPT

## 2019-02-14 PROCEDURE — 99215 OFFICE O/P EST HI 40 MIN: CPT | Mod: 25

## 2019-02-15 NOTE — PHYSICAL EXAM
[General Appearance - Well Developed] : well developed [Normal Appearance] : normal appearance [Well Groomed] : well groomed [General Appearance - Well Nourished] : well nourished [No Deformities] : no deformities [General Appearance - In No Acute Distress] : no acute distress [Normal Conjunctiva] : the conjunctiva exhibited no abnormalities [Eyelids - No Xanthelasma] : the eyelids demonstrated no xanthelasmas [Normal Oral Mucosa] : normal oral mucosa [No Oral Pallor] : no oral pallor [No Oral Cyanosis] : no oral cyanosis [] : no respiratory distress [Respiration, Rhythm And Depth] : normal respiratory rhythm and effort [Exaggerated Use Of Accessory Muscles For Inspiration] : no accessory muscle use [Auscultation Breath Sounds / Voice Sounds] : lungs were clear to auscultation bilaterally [Oriented To Time, Place, And Person] : oriented to person, place, and time [Affect] : the affect was normal [Mood] : the mood was normal [No Anxiety] : not feeling anxious [FreeTextEntry1] : left BKA with LE prosthesis

## 2019-02-15 NOTE — HISTORY OF PRESENT ILLNESS
[FreeTextEntry1] : 63 year old gentleman with history of persistent atrial fibrillation on rate control, HTN, alcoholism, recurrent falls s/p BKA, and CHF with preserved LV function. HE has been in AF with reasonable rate control which has not been very symptomatic. He has been on Toprol and diltiazem, as well as digoxin (though the later was planned to be stopped).  However despite this and CHADSVASc score of 4 (HTN, CVA, CHF) he has not been on anticoagulation (he has been on ASA alone) given his severe alcohol abuse and multiple recurrent falls. At last visit with Dr. Clark they did discuss the initiation of anticoagulation given elevated CHADsVASc score but after discussion with PMD and cardiologist Dr. Daley it was stopped due to patients unsteady gain and hx of multiple falls especially given BKA and alcohol use.  At last visit the  potential for Watchman procedure was also discussed.  At this point the patient and daughter are eager to proceed with Watchman procedure. ECG today reveals AF with average rate 66 bpm and narrow QRS.

## 2019-02-15 NOTE — REASON FOR VISIT
[Follow-Up - Clinic] : a clinic follow-up of [Atrial Fibrillation] : atrial fibrillation [Family Member] : family member

## 2019-02-15 NOTE — REVIEW OF SYSTEMS
[Dyspnea on exertion] : dyspnea during exertion [see HPI] : see HPI [Negative] : Heme/Lymph [Fever] : no fever [Headache] : no headache [Chills] : no chills [Feeling Fatigued] : not feeling fatigued [Blurry Vision] : no blurred vision [Seeing Double (Diplopia)] : no diplopia [Shortness Of Breath] : no shortness of breath [Chest  Pressure] : no chest pressure [Chest Pain] : no chest pain [Leg Claudication] : no intermittent leg claudication [Palpitations] : no palpitations [Nausea] : no nausea [Vomiting] : no vomiting [Change in Appetite] : no change in appetite [Change In The Stool] : no change in stool [Dizziness] : no dizziness [Convulsions] : no convulsions

## 2019-02-15 NOTE — ADDENDUM
[FreeTextEntry1] : I was present for the above evaluation and agree with the history, physical exam, assessment and plan as above. Pt still with persistent atrial fibrillation, and elevated thromboembolic risk in setting of his HTN, CHF, and ischemic disease noted on prior brain imaging. He had been restarted on anticoagulation, but given ongoing alcohol use and frequent traumatic falls, is considered too high risk for long-term anticoagulation. We therefore again reviewed the potential for left atrial appendage occlusion eg with the Watchman procedure as an alternative to long-term anticoagulation. The patient and his daughter are anxious to proceed with this possibility, given concern for risks on anticoagulation. Will review with his cardiologist as well. We reviewed the risks and benefits of this approach, including procedure related risks of bleeding, vascular injury, and stroke.\par -Will proceed with planning for Watchman procedure, including assessment of LA anatomy with CT (or MYA). Will need short-term anticoagulation after the implant, likely with Xarelto or other NOAC (preferable to coumadin given his difficulty with medication).

## 2019-02-15 NOTE — DISCUSSION/SUMMARY
[FreeTextEntry1] : 63 year old gentleman with history of persistent atrial fibrillation on rate control, HTN, alcoholism, recurrent falls s/p BKA, and CHF with preserved LV function and pulmonary hypertension previously noted. \par  -high risk of AF related thromboembolism (CHADSVASc =4), unable to tolerate long term anticoagulation due to unsteady gait and falls. \par - discussed Watchman procedure as alternative to long-term anticoagulation.  We discussed short term course of anticoagulation following Watchman and patient is agreeable. \par -will plan for CT heart for structural evaluation pre watchman\par -will discuss above with Dr. Daley and plan for MYA with Watchman device \par \par Lisette Taylor ANP-C

## 2019-02-18 ENCOUNTER — RX RENEWAL (OUTPATIENT)
Age: 64
End: 2019-02-18

## 2019-02-23 NOTE — PROGRESS NOTE ADULT - ASSESSMENT
62 yom with multiple medical problems biggest ETOH abuse now with ETOH withdrawal and delirium, afib, fever Ambulatory

## 2019-02-28 ENCOUNTER — RX RENEWAL (OUTPATIENT)
Age: 64
End: 2019-02-28

## 2019-03-03 ENCOUNTER — RX RENEWAL (OUTPATIENT)
Age: 64
End: 2019-03-03

## 2019-03-04 ENCOUNTER — RX RENEWAL (OUTPATIENT)
Age: 64
End: 2019-03-04

## 2019-03-04 ENCOUNTER — APPOINTMENT (OUTPATIENT)
Dept: CARDIOLOGY | Facility: CLINIC | Age: 64
End: 2019-03-04
Payer: MEDICARE

## 2019-03-08 ENCOUNTER — RX RENEWAL (OUTPATIENT)
Age: 64
End: 2019-03-08

## 2019-03-11 ENCOUNTER — NON-APPOINTMENT (OUTPATIENT)
Age: 64
End: 2019-03-11

## 2019-03-11 ENCOUNTER — APPOINTMENT (OUTPATIENT)
Dept: CARDIOLOGY | Facility: CLINIC | Age: 64
End: 2019-03-11
Payer: MEDICARE

## 2019-03-11 VITALS — HEART RATE: 94 BPM | DIASTOLIC BLOOD PRESSURE: 70 MMHG | SYSTOLIC BLOOD PRESSURE: 118 MMHG | OXYGEN SATURATION: 93 %

## 2019-03-11 PROCEDURE — 93000 ELECTROCARDIOGRAM COMPLETE: CPT

## 2019-03-11 PROCEDURE — 99214 OFFICE O/P EST MOD 30 MIN: CPT | Mod: 25

## 2019-03-11 PROCEDURE — 99406 BEHAV CHNG SMOKING 3-10 MIN: CPT

## 2019-03-11 NOTE — REASON FOR VISIT
[Follow-Up - Clinic] : a clinic follow-up of [Atrial Fibrillation] : atrial fibrillation [Hypertension] : hypertension [Other: _____] : [unfilled]

## 2019-03-12 ENCOUNTER — NON-APPOINTMENT (OUTPATIENT)
Age: 64
End: 2019-03-12

## 2019-03-12 NOTE — HISTORY OF PRESENT ILLNESS
[FreeTextEntry1] : 63 f/o M accompanied by his daughter, with history of persistent atrial fibrillation on rate control, HTN, alcoholism, recurrent falls s/p BKA, and CHF with preserved LV function. He has been in AF with reasonable rate control which has not been very symptomatic. He has been on Toprol and diltiazem, as well as digoxin. 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 He is no longer on AC due to history of alcohol abuse and multiple major falls.  \par He follows with Dr Clark and plan is for Watchman device 04/02/2019.\par Oral surgery 03/25/2019 at [facility].  \par He states that he feels well overall and has no complaints.  He denies CP, SOB, diaphoresis, palpitations, dizziness, syncope, LE edema, PND. \par Exercise tolerance is poor - has leg pain at stump site and becomes SOB with exertion\par \par Of note, 2018 he had a prolonged hospitalization with respiratory decompensation and had very elevated pulmonary pressures at that time. Repeat TTE was planned but not yet performed. \par Smokes 1/2 PPD\par Drinks scotch 3x/week - 2-3 drinks\par

## 2019-03-12 NOTE — PHYSICAL EXAM
[General Appearance - Well Developed] : well developed [Normal Appearance] : normal appearance [General Appearance - Well Nourished] : well nourished [General Appearance - In No Acute Distress] : no acute distress [Normal Conjunctiva] : the conjunctiva exhibited no abnormalities [Eyelids - No Xanthelasma] : the eyelids demonstrated no xanthelasmas [Normal Oral Mucosa] : normal oral mucosa [No Oral Pallor] : no oral pallor [No Oral Cyanosis] : no oral cyanosis [JVD Elevated _____cm] : JVD elevated [unfilled] ~U cm above clavicle [Normal Jugular Venous A Waves Present] : normal jugular venous A waves present [No Jugular Venous [place] A Waves] : no jugular venous [place] A waves [Respiration, Rhythm And Depth] : normal respiratory rhythm and effort [Exaggerated Use Of Accessory Muscles For Inspiration] : no accessory muscle use [Heart Sounds] : normal S1 and S2 [Murmurs] : no murmurs present [Abdomen Soft] : soft [Abdomen Tenderness] : non-tender [Abdomen Mass (___ Cm)] : no abdominal mass palpated [Abnormal Walk] : normal gait [Gait - Sufficient For Exercise Testing] : the gait was sufficient for exercise testing [Nail Clubbing] : no clubbing of the fingernails [Cyanosis, Localized] : no localized cyanosis [Petechial Hemorrhages (___cm)] : no petechial hemorrhages [] : no ischemic changes [Oriented To Time, Place, And Person] : oriented to person, place, and time [FreeTextEntry1] : bilateral tight edema, mild erythema, lateral aspect of right ankle with open sore

## 2019-03-12 NOTE — DISCUSSION/SUMMARY
[FreeTextEntry1] : 63 year old gentleman with history of persistent atrial fibrillation on rate control, HTN, alcoholism, recurrent falls s/p BKA, and CHF with preserved LV function and pulmonary hypertension previously noted. \par -He has prior stroke noted on MRI and high risk of AF related thromboembolism (CHADSVASc =4). Although AC would be ideal, he is a poor candidate given his alcohol abuse and multiple falls\par -continue rate control for AF with Toprol 100 and diltiazem 240 mg qd for now\par -TTE 10/29/18 shows low-normal LV function, mild MS, mod-sev MR, sev LAE, sev TR, sev pHTN. \par - He does not need antibiotic prophylaxis prior to dental procedure\par - Nuclear stress test 12/2018 showed normal myocardial perfusion\par - This pt is a moderate cardiac risk for general anesthesia - would prefer routine local anesthesia with sedation.  Monitor hemodynamics carefully and be aware of his history of substance abuse\par - He is currently NOT on Xarelto.  If necessary for procedure, he may stop ASA 7 days prior to decrease bleeding risk.   \par -f/u with me in 3 mo \par -The described plan was discussed with the pt and daughter.  All questions and concerns were addressed to the best of my knowledge. \par

## 2019-03-12 NOTE — REVIEW OF SYSTEMS
[see HPI] : see HPI [Lower Ext Edema] : lower extremity edema [Negative] : Heme/Lymph [Fever] : no fever [Recent Weight Gain (___ Lbs)] : no recent weight gain [Chills] : no chills [Feeling Fatigued] : not feeling fatigued [Shortness Of Breath] : no shortness of breath [Dyspnea on exertion] : dyspnea during exertion [Chest Pain] : no chest pain [Palpitations] : no palpitations [Dizziness] : no dizziness [Convulsions] : no convulsions

## 2019-03-24 ENCOUNTER — FORM ENCOUNTER (OUTPATIENT)
Age: 64
End: 2019-03-24

## 2019-03-25 ENCOUNTER — APPOINTMENT (OUTPATIENT)
Dept: FAMILY MEDICINE | Facility: CLINIC | Age: 64
End: 2019-03-25
Payer: MEDICARE

## 2019-03-25 ENCOUNTER — OUTPATIENT (OUTPATIENT)
Dept: OUTPATIENT SERVICES | Facility: HOSPITAL | Age: 64
LOS: 1 days | End: 2019-03-25
Payer: MEDICARE

## 2019-03-25 VITALS
SYSTOLIC BLOOD PRESSURE: 118 MMHG | OXYGEN SATURATION: 98 % | DIASTOLIC BLOOD PRESSURE: 79 MMHG | HEIGHT: 72 IN | HEART RATE: 98 BPM | RESPIRATION RATE: 18 BRPM | TEMPERATURE: 99 F | WEIGHT: 250 LBS

## 2019-03-25 VITALS
SYSTOLIC BLOOD PRESSURE: 110 MMHG | DIASTOLIC BLOOD PRESSURE: 70 MMHG | WEIGHT: 250 LBS | BODY MASS INDEX: 33.86 KG/M2 | HEART RATE: 76 BPM | OXYGEN SATURATION: 93 % | HEIGHT: 72 IN

## 2019-03-25 VITALS
HEIGHT: 72 IN | SYSTOLIC BLOOD PRESSURE: 118 MMHG | WEIGHT: 250 LBS | HEART RATE: 98 BPM | RESPIRATION RATE: 20 BRPM | OXYGEN SATURATION: 96 % | DIASTOLIC BLOOD PRESSURE: 79 MMHG

## 2019-03-25 DIAGNOSIS — Z89.512 ACQUIRED ABSENCE OF LEFT LEG BELOW KNEE: Chronic | ICD-10-CM

## 2019-03-25 DIAGNOSIS — Z01.810 ENCOUNTER FOR PREPROCEDURAL CARDIOVASCULAR EXAMINATION: ICD-10-CM

## 2019-03-25 DIAGNOSIS — I48.91 UNSPECIFIED ATRIAL FIBRILLATION: ICD-10-CM

## 2019-03-25 DIAGNOSIS — Z01.818 ENCOUNTER FOR OTHER PREPROCEDURAL EXAMINATION: ICD-10-CM

## 2019-03-25 LAB
ANION GAP SERPL CALC-SCNC: 11 MMOL/L — SIGNIFICANT CHANGE UP (ref 5–17)
APTT BLD: 31.4 SEC — SIGNIFICANT CHANGE UP (ref 27.5–36.3)
BLD GP AB SCN SERPL QL: SIGNIFICANT CHANGE UP
BUN SERPL-MCNC: 21 MG/DL — HIGH (ref 8–20)
CALCIUM SERPL-MCNC: 9.4 MG/DL — SIGNIFICANT CHANGE UP (ref 8.6–10.2)
CHLORIDE SERPL-SCNC: 96 MMOL/L — LOW (ref 98–107)
CO2 SERPL-SCNC: 26 MMOL/L — SIGNIFICANT CHANGE UP (ref 22–29)
CREAT SERPL-MCNC: 0.87 MG/DL — SIGNIFICANT CHANGE UP (ref 0.5–1.3)
GLUCOSE SERPL-MCNC: 110 MG/DL — SIGNIFICANT CHANGE UP (ref 70–115)
HCT VFR BLD CALC: 40.5 % — LOW (ref 42–52)
HGB BLD-MCNC: 13.3 G/DL — LOW (ref 14–18)
INR BLD: 1.11 RATIO — SIGNIFICANT CHANGE UP (ref 0.88–1.16)
MAGNESIUM SERPL-MCNC: 1.2 MG/DL — LOW (ref 1.8–2.6)
MCHC RBC-ENTMCNC: 31.1 PG — HIGH (ref 27–31)
MCHC RBC-ENTMCNC: 32.8 G/DL — SIGNIFICANT CHANGE UP (ref 32–36)
MCV RBC AUTO: 94.8 FL — HIGH (ref 80–94)
PLATELET # BLD AUTO: 140 K/UL — LOW (ref 150–400)
POTASSIUM SERPL-MCNC: 4.7 MMOL/L — SIGNIFICANT CHANGE UP (ref 3.5–5.3)
POTASSIUM SERPL-SCNC: 4.7 MMOL/L — SIGNIFICANT CHANGE UP (ref 3.5–5.3)
PROTHROM AB SERPL-ACNC: 12.8 SEC — SIGNIFICANT CHANGE UP (ref 10–12.9)
RBC # BLD: 4.27 M/UL — LOW (ref 4.6–6.2)
RBC # FLD: 16 % — HIGH (ref 11–15.6)
SODIUM SERPL-SCNC: 133 MMOL/L — LOW (ref 135–145)
TYPE + AB SCN PNL BLD: SIGNIFICANT CHANGE UP
WBC # BLD: 3.8 K/UL — LOW (ref 4.8–10.8)
WBC # FLD AUTO: 3.8 K/UL — LOW (ref 4.8–10.8)

## 2019-03-25 PROCEDURE — 75572 CT HRT W/3D IMAGE: CPT

## 2019-03-25 PROCEDURE — 99214 OFFICE O/P EST MOD 30 MIN: CPT

## 2019-03-25 PROCEDURE — 75572 CT HRT W/3D IMAGE: CPT | Mod: 26

## 2019-03-25 PROCEDURE — 93010 ELECTROCARDIOGRAM REPORT: CPT

## 2019-03-25 NOTE — H&P PST ADULT - NSICDXPASTMEDICALHX_GEN_ALL_CORE_FT
PAST MEDICAL HISTORY:  Alcohol abuse     Alcohol withdrawal     Anemia     CHF (congestive heart failure)     Chronic atrial fibrillation     Chronic obstructive pulmonary disease (COPD)     Cirrhosis     Collapsed lung     Emphysema of lung     Falls     GERD (gastroesophageal reflux disease)     Hypertension     Meningitis     Poor historian

## 2019-03-25 NOTE — H&P PST ADULT - ASSESSMENT
63 year old man with history of GERD, COPD, HTN, alcoholism, CHF with preserved LV function, recurrent falls and persistent atrial fibrillation.  He has CHADSVASC score of 4 but due to his alcohol abuse and multiple recurrent falls he is high risk for long term anticoagulation.  He presents today for PST in anticipation of elective RUDOLPH occlusion via Watchman device.     Plan:  Watchman device 4/2/19 9:30AM arrival   -cardiac CTA today  -NPO after midnight night before procedure   Discharge education initiated. 63 year old man with history of GERD, COPD, HTN, alcoholism, CHF with preserved LV function, recurrent falls and persistent atrial fibrillation.  He has CHADSVASC score of 4 but due to his alcohol abuse and multiple recurrent falls he is high risk for long term anticoagulation.  He presents today for PST in anticipation of elective RUDOLPH occlusion via Watchman device.     Plan:  Watchman device 4/2/19 9:30AM arrival   -CT heart without coronaries w/IV contrast today   -NPO after midnight night before procedure   Discharge education initiated.

## 2019-03-25 NOTE — ASU PATIENT PROFILE, ADULT - PMH
Alcohol abuse    Alcohol withdrawal    Anemia    CHF (congestive heart failure)    Chronic atrial fibrillation    Chronic obstructive pulmonary disease (COPD)    Cirrhosis    Collapsed lung    Emphysema of lung    Falls    GERD (gastroesophageal reflux disease)    Hypertension    Meningitis    Poor historian

## 2019-03-25 NOTE — H&P PST ADULT - HISTORY OF PRESENT ILLNESS
63 year old man with history of GERD, COPD, HTN, alcoholism, CHF with preserved LV function, recurrent falls and persistent atrial fibrillation.  He has CHADSVASC score of 4 but due to his alcohol abuse and multiple recurrent falls he is high risk for long term anticoagulation.  He presents today for PST in anticipation of MYA and RUDOLPH occlusion via Watchman device.        Cardiology summary:   Echo: 11/6/17, LVEF 50-55%, severely increased LV size, severely dilated RA, severely enlarged LA, mod to severe MR, mild-mod MR 63 year old man with history of GERD, COPD, HTN, alcoholism, CHF with preserved LV function, recurrent falls and persistent atrial fibrillation.  He has CHADSVASC score of 4 but due to his alcohol abuse and multiple recurrent falls he is high risk for long term anticoagulation.  He presents today for PST in anticipation of elective RUDOLPH occlusion via Watchman device.  Pt denies any complaints at this time.        Cardiology summary:   Echo: 11/6/17, LVEF 50-55%, severely increased LV size, severely dilated RA, severely enlarged LA, mod to severe MR, mild-mod MR

## 2019-03-25 NOTE — ASU PATIENT PROFILE, ADULT - NSALCOHOLFREQ_GEN_A_CORE_SD
History & Physical  Gynecology      SUBJECTIVE:     Chief Complaint: IUD Check       History of Present Illness:  Ms. Michaels is a 41 y.o. female who returns 4 weeks after an IUD placement.  She has complaints today of partner discomfort during intercourse.  No spotting, cramping or bleeding currently.  Overall, pleased with IUD.     Review of Systems:  Review of Systems   Genitourinary: Negative for dysmenorrhea, dyspareunia, menorrhagia, menstrual problem, pelvic pain, vaginal bleeding, vaginal discharge, vaginal pain and vaginal odor.        OBJECTIVE:     Physical Exam:  Physical Exam   Constitutional: She is oriented to person, place, and time. She appears well-developed and well-nourished.   Pulmonary/Chest: Effort normal.   Genitourinary: Vagina normal. No labial fusion. There is no rash, tenderness, lesion or injury on the right labia. There is no rash, tenderness, lesion or injury on the left labia. Cervix exhibits no motion tenderness, no discharge and no friability. No erythema, tenderness or bleeding in the vagina. No foreign body in the vagina. No signs of injury around the vagina. No vaginal discharge found.   Genitourinary Comments: Strings visualized and trimmed   Neurological: She is alert and oriented to person, place, and time.   Psychiatric: She has a normal mood and affect. Her behavior is normal. Judgment and thought content normal.   Nursing note and vitals reviewed.      Chaperoned by: Samia    ASSESSMENT:       ICD-10-CM ICD-9-CM    1. IUD check up Z30.431 V25.42           Plan:      Ashli was seen today for iud check.    Diagnoses and all orders for this visit:    IUD check up        No orders of the defined types were placed in this encounter.      IUD strings visualized on exam.  Strings did require trimming.    Follow up for annual exam.     Shanon Romo      
daily

## 2019-03-26 PROBLEM — Z01.818 PRE-OP EXAM: Status: ACTIVE | Noted: 2019-03-26

## 2019-03-26 PROBLEM — I48.91 UNSPECIFIED ATRIAL FIBRILLATION: Chronic | Status: INACTIVE | Noted: 2017-10-28 | Resolved: 2019-03-25

## 2019-03-26 PROBLEM — F10.10 ALCOHOL ABUSE, UNCOMPLICATED: Chronic | Status: INACTIVE | Noted: 2017-10-28 | Resolved: 2019-03-25

## 2019-03-26 NOTE — HISTORY OF PRESENT ILLNESS
[Atrial Fibrillation] : atrial fibrillation [COPD] : COPD [No Adverse Anesthesia Reaction] : no adverse anesthesia reaction in self or family member [FreeTextEntry1] : teeth extraction  [FreeTextEntry2] : 3/26/19 [FreeTextEntry3] : Dr. Spring  [FreeTextEntry4] : 63 year old male here for medical clearance for tooth extraction scheduled for tomorrow. Ada has received cardiac clearance. He feels well at present. He has stopped asirin. He is accompanied today by daughter

## 2019-03-26 NOTE — ASSESSMENT
[Patient Optimized for Surgery] : Patient optimized for surgery [FreeTextEntry4] : 63 year old male with multiple chronic medical conditions\par patient at moderate risk for produre under genral anesthesia but is optimized for surgery pending any pre-surgical lab testing.

## 2019-03-26 NOTE — PLAN
[FreeTextEntry1] : Note-oral surgeon is out of network and family has decided to postpone tooth extraction and obtain consulation with a covered provider in order to prevent unecessary expenses. PAtient understands\par clearance is good for 30 days after visit only.

## 2019-03-27 ENCOUNTER — OTHER (OUTPATIENT)
Age: 64
End: 2019-03-27

## 2019-04-02 ENCOUNTER — INPATIENT (INPATIENT)
Facility: HOSPITAL | Age: 64
LOS: 0 days | Discharge: HOME | DRG: 274 | End: 2019-04-03
Attending: STUDENT IN AN ORGANIZED HEALTH CARE EDUCATION/TRAINING PROGRAM | Admitting: STUDENT IN AN ORGANIZED HEALTH CARE EDUCATION/TRAINING PROGRAM
Payer: MEDICARE

## 2019-04-02 ENCOUNTER — TRANSCRIPTION ENCOUNTER (OUTPATIENT)
Age: 64
End: 2019-04-02

## 2019-04-02 VITALS
HEART RATE: 69 BPM | SYSTOLIC BLOOD PRESSURE: 103 MMHG | DIASTOLIC BLOOD PRESSURE: 57 MMHG | RESPIRATION RATE: 20 BRPM | OXYGEN SATURATION: 92 %

## 2019-04-02 DIAGNOSIS — I48.91 UNSPECIFIED ATRIAL FIBRILLATION: ICD-10-CM

## 2019-04-02 DIAGNOSIS — Z89.512 ACQUIRED ABSENCE OF LEFT LEG BELOW KNEE: Chronic | ICD-10-CM

## 2019-04-02 LAB — MAGNESIUM SERPL-MCNC: 1.3 MG/DL — LOW (ref 1.6–2.6)

## 2019-04-02 PROCEDURE — 86923 COMPATIBILITY TEST ELECTRIC: CPT

## 2019-04-02 PROCEDURE — 86900 BLOOD TYPING SEROLOGIC ABO: CPT

## 2019-04-02 PROCEDURE — 76376 3D RENDER W/INTRP POSTPROCES: CPT | Mod: 26

## 2019-04-02 PROCEDURE — 80048 BASIC METABOLIC PNL TOTAL CA: CPT

## 2019-04-02 PROCEDURE — 93010 ELECTROCARDIOGRAM REPORT: CPT

## 2019-04-02 PROCEDURE — 93312 ECHO TRANSESOPHAGEAL: CPT | Mod: 26

## 2019-04-02 PROCEDURE — 85027 COMPLETE CBC AUTOMATED: CPT

## 2019-04-02 PROCEDURE — 36415 COLL VENOUS BLD VENIPUNCTURE: CPT

## 2019-04-02 PROCEDURE — 93325 DOPPLER ECHO COLOR FLOW MAPG: CPT | Mod: 26

## 2019-04-02 PROCEDURE — 93005 ELECTROCARDIOGRAM TRACING: CPT

## 2019-04-02 PROCEDURE — 86901 BLOOD TYPING SEROLOGIC RH(D): CPT

## 2019-04-02 PROCEDURE — G0463: CPT

## 2019-04-02 PROCEDURE — 83735 ASSAY OF MAGNESIUM: CPT

## 2019-04-02 PROCEDURE — 86850 RBC ANTIBODY SCREEN: CPT

## 2019-04-02 PROCEDURE — 85610 PROTHROMBIN TIME: CPT

## 2019-04-02 PROCEDURE — 33340 PERQ CLSR TCAT L ATR APNDGE: CPT | Mod: Q0

## 2019-04-02 PROCEDURE — 93320 DOPPLER ECHO COMPLETE: CPT | Mod: 26

## 2019-04-02 PROCEDURE — 85730 THROMBOPLASTIN TIME PARTIAL: CPT

## 2019-04-02 RX ORDER — ALBUTEROL 90 UG/1
2 AEROSOL, METERED ORAL EVERY 6 HOURS
Qty: 0 | Refills: 0 | Status: DISCONTINUED | OUTPATIENT
Start: 2019-04-02 | End: 2019-04-03

## 2019-04-02 RX ORDER — LISINOPRIL 2.5 MG/1
5 TABLET ORAL DAILY
Qty: 0 | Refills: 0 | Status: DISCONTINUED | OUTPATIENT
Start: 2019-04-02 | End: 2019-04-03

## 2019-04-02 RX ORDER — TIOTROPIUM BROMIDE 18 UG/1
1 CAPSULE ORAL; RESPIRATORY (INHALATION) DAILY
Qty: 0 | Refills: 0 | Status: DISCONTINUED | OUTPATIENT
Start: 2019-04-02 | End: 2019-04-03

## 2019-04-02 RX ORDER — RIVAROXABAN 15 MG-20MG
20 KIT ORAL EVERY 24 HOURS
Qty: 0 | Refills: 0 | Status: DISCONTINUED | OUTPATIENT
Start: 2019-04-03 | End: 2019-04-03

## 2019-04-02 RX ORDER — FUROSEMIDE 40 MG
40 TABLET ORAL DAILY
Qty: 0 | Refills: 0 | Status: DISCONTINUED | OUTPATIENT
Start: 2019-04-02 | End: 2019-04-03

## 2019-04-02 RX ORDER — GABAPENTIN 400 MG/1
400 CAPSULE ORAL THREE TIMES A DAY
Qty: 0 | Refills: 0 | Status: DISCONTINUED | OUTPATIENT
Start: 2019-04-02 | End: 2019-04-03

## 2019-04-02 RX ORDER — METOPROLOL TARTRATE 50 MG
100 TABLET ORAL DAILY
Qty: 0 | Refills: 0 | Status: DISCONTINUED | OUTPATIENT
Start: 2019-04-02 | End: 2019-04-03

## 2019-04-02 RX ORDER — ASPIRIN/CALCIUM CARB/MAGNESIUM 324 MG
81 TABLET ORAL DAILY
Qty: 0 | Refills: 0 | Status: DISCONTINUED | OUTPATIENT
Start: 2019-04-02 | End: 2019-04-03

## 2019-04-02 RX ORDER — TRAZODONE HCL 50 MG
50 TABLET ORAL AT BEDTIME
Qty: 0 | Refills: 0 | Status: DISCONTINUED | OUTPATIENT
Start: 2019-04-02 | End: 2019-04-03

## 2019-04-02 RX ORDER — DILTIAZEM HCL 120 MG
240 CAPSULE, EXT RELEASE 24 HR ORAL DAILY
Qty: 0 | Refills: 0 | Status: DISCONTINUED | OUTPATIENT
Start: 2019-04-02 | End: 2019-04-03

## 2019-04-02 RX ORDER — TAMSULOSIN HYDROCHLORIDE 0.4 MG/1
0.4 CAPSULE ORAL AT BEDTIME
Qty: 0 | Refills: 0 | Status: DISCONTINUED | OUTPATIENT
Start: 2019-04-02 | End: 2019-04-03

## 2019-04-02 RX ORDER — MAGNESIUM SULFATE 500 MG/ML
2 VIAL (ML) INJECTION ONCE
Qty: 0 | Refills: 0 | Status: COMPLETED | OUTPATIENT
Start: 2019-04-02 | End: 2019-04-02

## 2019-04-02 RX ORDER — FOLIC ACID 0.8 MG
1 TABLET ORAL DAILY
Qty: 0 | Refills: 0 | Status: DISCONTINUED | OUTPATIENT
Start: 2019-04-02 | End: 2019-04-03

## 2019-04-02 RX ADMIN — GABAPENTIN 400 MILLIGRAM(S): 400 CAPSULE ORAL at 16:19

## 2019-04-02 RX ADMIN — Medication 50 GRAM(S): at 11:30

## 2019-04-02 RX ADMIN — TAMSULOSIN HYDROCHLORIDE 0.4 MILLIGRAM(S): 0.4 CAPSULE ORAL at 21:10

## 2019-04-02 RX ADMIN — GABAPENTIN 400 MILLIGRAM(S): 400 CAPSULE ORAL at 21:11

## 2019-04-02 NOTE — DISCHARGE NOTE PROVIDER - NSDCCPTREATMENT_GEN_ALL_CORE_FT
PRINCIPAL PROCEDURE  Procedure: Insertion, Watchman left atrial appendage closure device  Findings and Treatment: - Bruising at the groin, sometimes extending down the leg, and/or a small lump under the skin at the groin access site is normal and will resolve within 2 – 3 weeks.   - Occasional skipped beats or palpitations that last for a few beats are common and generally resolve within 1-2 months.   - You may walk and take stairs at a regular pace.   - Do not perform any exercise more strenuous than walking for 1 week.   - Do not strain or lift heavy objects for 1 week.  - You may shower the day after the procedure.  - Do not soak in water (such as tub baths, hot tubs, swimming, etc.) for 1 week.   - You may resume all other activities the day after the procedure.  Call your doctor if:   - you notice bleeding, redness, drainage, swelling, increased tenderness or a hot sensation around the catheter insertion site.   - your temperature is greater than 100 degrees F for more than 24 hours.  - your rapid heart rhythm returns.  - you have any questions or concerns regarding the procedure.  If significant bleeding and/or a large lump (the size of a golf ball or bigger) occurs:  - Lie flat and apply continuous direct pressure just above the puncture site for at least 10 minutes  - If the issue resolves, notify your physician immediately.    - If the bleeding cannot be controlled, please seek immediate medical attention.  If you experience increased difficulty breathing or chest pain, or if you faint or have dizzy spells, please seek immediate medical attention.

## 2019-04-02 NOTE — DISCHARGE NOTE PROVIDER - CARE PROVIDER_API CALL
Tien Clark (MD)  Cardiology; Internal Medicine  [address], [address]  [place] [address]  Phone: [phone]  Fax: [phone]  Follow Up Time:     Brittani Daley (DO)  Internal Medicine  [address], [address]  [place] [address]  Phone: [phone]  Fax: [phone]  Follow Up Time:

## 2019-04-02 NOTE — DISCHARGE NOTE PROVIDER - NSDCFUADDINST_GEN_ALL_CORE_FT
Follow up with Dr. Clark in 2 weeks.  Our office will contact you in 3-5 days to schedule this appointment. Please call [phone] with questions or concerns.

## 2019-04-02 NOTE — DISCHARGE NOTE PROVIDER - CARE PROVIDERS DIRECT ADDRESSES
[email or web address] [email or web address]

## 2019-04-02 NOTE — DISCHARGE NOTE PROVIDER - HOSPITAL COURSE
63 year old man with history of GERD, COPD, HTN, alcoholism, CHF with preserved LV function, recurrent falls and persistent atrial fibrillation.  He has CHADSVASC score of 4 but due to his alcohol abuse and multiple recurrent falls he is high risk for long term anticoagulation.  He is now status post elective RUDOLPH occlusion via Watchman device.  Pt will start Xarelto 20mg PO daily and follow up with Dr. Clark in 2 weeks.  Plan for repeat MYA post Watchman in approx 6 weeks.

## 2019-04-03 ENCOUNTER — TRANSCRIPTION ENCOUNTER (OUTPATIENT)
Age: 64
End: 2019-04-03

## 2019-04-03 VITALS
DIASTOLIC BLOOD PRESSURE: 83 MMHG | RESPIRATION RATE: 18 BRPM | TEMPERATURE: 98 F | OXYGEN SATURATION: 96 % | HEART RATE: 83 BPM | SYSTOLIC BLOOD PRESSURE: 118 MMHG

## 2019-04-03 LAB
ANION GAP SERPL CALC-SCNC: 12 MMOL/L — SIGNIFICANT CHANGE UP (ref 5–17)
BUN SERPL-MCNC: 22 MG/DL — HIGH (ref 8–20)
CALCIUM SERPL-MCNC: 9 MG/DL — SIGNIFICANT CHANGE UP (ref 8.6–10.2)
CHLORIDE SERPL-SCNC: 98 MMOL/L — SIGNIFICANT CHANGE UP (ref 98–107)
CO2 SERPL-SCNC: 23 MMOL/L — SIGNIFICANT CHANGE UP (ref 22–29)
CREAT SERPL-MCNC: 1.07 MG/DL — SIGNIFICANT CHANGE UP (ref 0.5–1.3)
GLUCOSE SERPL-MCNC: 171 MG/DL — HIGH (ref 70–115)
HCT VFR BLD CALC: 37 % — LOW (ref 42–52)
HGB BLD-MCNC: 12.5 G/DL — LOW (ref 14–18)
MAGNESIUM SERPL-MCNC: 1.7 MG/DL — SIGNIFICANT CHANGE UP (ref 1.6–2.6)
MCHC RBC-ENTMCNC: 31.3 PG — HIGH (ref 27–31)
MCHC RBC-ENTMCNC: 33.8 G/DL — SIGNIFICANT CHANGE UP (ref 32–36)
MCV RBC AUTO: 92.5 FL — SIGNIFICANT CHANGE UP (ref 80–94)
PLATELET # BLD AUTO: 227 K/UL — SIGNIFICANT CHANGE UP (ref 150–400)
POTASSIUM SERPL-MCNC: 4.6 MMOL/L — SIGNIFICANT CHANGE UP (ref 3.5–5.3)
POTASSIUM SERPL-SCNC: 4.6 MMOL/L — SIGNIFICANT CHANGE UP (ref 3.5–5.3)
RBC # BLD: 4 M/UL — LOW (ref 4.6–6.2)
RBC # FLD: 14.3 % — SIGNIFICANT CHANGE UP (ref 11–15.6)
SODIUM SERPL-SCNC: 133 MMOL/L — LOW (ref 135–145)
WBC # BLD: 6.6 K/UL — SIGNIFICANT CHANGE UP (ref 4.8–10.8)
WBC # FLD AUTO: 6.6 K/UL — SIGNIFICANT CHANGE UP (ref 4.8–10.8)

## 2019-04-03 PROCEDURE — C1887: CPT

## 2019-04-03 PROCEDURE — C1769: CPT

## 2019-04-03 PROCEDURE — C1893: CPT

## 2019-04-03 PROCEDURE — 93312 ECHO TRANSESOPHAGEAL: CPT

## 2019-04-03 PROCEDURE — C1889: CPT

## 2019-04-03 PROCEDURE — 93325 DOPPLER ECHO COLOR FLOW MAPG: CPT

## 2019-04-03 PROCEDURE — 80048 BASIC METABOLIC PNL TOTAL CA: CPT

## 2019-04-03 PROCEDURE — 93010 ELECTROCARDIOGRAM REPORT: CPT

## 2019-04-03 PROCEDURE — 36415 COLL VENOUS BLD VENIPUNCTURE: CPT

## 2019-04-03 PROCEDURE — 93005 ELECTROCARDIOGRAM TRACING: CPT

## 2019-04-03 PROCEDURE — 85027 COMPLETE CBC AUTOMATED: CPT

## 2019-04-03 PROCEDURE — C1894: CPT

## 2019-04-03 PROCEDURE — 93320 DOPPLER ECHO COMPLETE: CPT

## 2019-04-03 PROCEDURE — 83735 ASSAY OF MAGNESIUM: CPT

## 2019-04-03 RX ORDER — MAGNESIUM SULFATE 500 MG/ML
2 VIAL (ML) INJECTION ONCE
Qty: 0 | Refills: 0 | Status: COMPLETED | OUTPATIENT
Start: 2019-04-03 | End: 2019-04-03

## 2019-04-03 RX ORDER — ALPRAZOLAM 0.25 MG
0.5 TABLET ORAL ONCE
Qty: 0 | Refills: 0 | Status: DISCONTINUED | OUTPATIENT
Start: 2019-04-03 | End: 2019-04-03

## 2019-04-03 RX ORDER — RIVAROXABAN 15 MG-20MG
1 KIT ORAL
Qty: 30 | Refills: 3
Start: 2019-04-03 | End: 2019-07-31

## 2019-04-03 RX ADMIN — Medication 0.5 MILLIGRAM(S): at 03:39

## 2019-04-03 RX ADMIN — Medication 40 MILLIGRAM(S): at 06:26

## 2019-04-03 RX ADMIN — GABAPENTIN 400 MILLIGRAM(S): 400 CAPSULE ORAL at 06:25

## 2019-04-03 RX ADMIN — Medication 100 MILLIGRAM(S): at 06:25

## 2019-04-03 RX ADMIN — LISINOPRIL 5 MILLIGRAM(S): 2.5 TABLET ORAL at 06:26

## 2019-04-03 RX ADMIN — Medication 81 MILLIGRAM(S): at 09:45

## 2019-04-03 RX ADMIN — Medication 240 MILLIGRAM(S): at 06:26

## 2019-04-03 RX ADMIN — RIVAROXABAN 20 MILLIGRAM(S): KIT at 07:39

## 2019-04-03 RX ADMIN — Medication 50 GRAM(S): at 09:35

## 2019-04-03 NOTE — PROGRESS NOTE ADULT - SUBJECTIVE AND OBJECTIVE BOX
Patient seen today in bed. Figure 8 suture removed from right groin without complication. Telemetry reviewed. Magnesium supplemented.     EKG: AFib with rate of 72bpm; qRSD 98ms  TELE: AFib with well controlled rates. Short pauses while in AFib around 3 seconds in duration    MEDICATIONS  (STANDING):  aspirin enteric coated 81 milliGRAM(s) Oral daily  diltiazem    milliGRAM(s) Oral daily  folic acid 1 milliGRAM(s) Oral daily  furosemide    Tablet 40 milliGRAM(s) Oral daily  gabapentin 400 milliGRAM(s) Oral three times a day  lisinopril 5 milliGRAM(s) Oral daily  magnesium sulfate  IVPB 2 Gram(s) IV Intermittent once  metoprolol succinate  milliGRAM(s) Oral daily  multivitamin 1 Tablet(s) Oral daily  rivaroxaban 20 milliGRAM(s) Oral every 24 hours  tamsulosin 0.4 milliGRAM(s) Oral at bedtime    MEDICATIONS  (PRN):  ALBUTerol    90 MICROgram(s) HFA Inhaler 2 Puff(s) Inhalation every 6 hours PRN Bronchospasm  tiotropium 18 MICROgram(s) Capsule 1 Capsule(s) Inhalation daily PRN dyspnea/bronchospasm  traZODone 50 milliGRAM(s) Oral at bedtime PRN insomnia    Allergies  Ceclor (Rash)  Duricef (Rash)    PAST MEDICAL & SURGICAL HISTORY:  Hypertension  GERD (gastroesophageal reflux disease)  Chronic obstructive pulmonary disease (COPD)  Anemia  Falls  Meningitis  Collapsed lung  Alcohol withdrawal  Emphysema of lung  Cirrhosis  CHF (congestive heart failure)  Poor historian  Alcohol abuse  Chronic atrial fibrillation  S/P BKA (below knee amputation) unilateral, left    Vital Signs Last 24 Hrs  T(C): 36.4 (03 Apr 2019 07:52), Max: 36.4 (03 Apr 2019 07:52)  T(F): 97.6 (03 Apr 2019 07:52), Max: 97.6 (03 Apr 2019 07:52)  HR: 83 (03 Apr 2019 07:52) (58 - 91)  BP: 118/83 (03 Apr 2019 07:52) (94/53 - 131/60)  RR: 18 (03 Apr 2019 07:52) (16 - 20)  SpO2: 96% (03 Apr 2019 07:52) (92% - 99%)    Physical Exam:  Constitutional: NAD, AAOx3  Cardiovascular: +S1S2 IRIR  Pulmonary: CTA b/l, unlabored  GI: soft NTND +BS  Extremities: no pedal edema,   Right Groin: No hematoma.   Neuro: non focal, MARIE x4    LABS:                        12.5   6.6   )-----------( 227      ( 03 Apr 2019 04:53 )             37.0     04-03    133<L>  |  98  |  22.0<H>  ----------------------------<  171<H>  4.6   |  23.0  |  1.07  Ca    9.0      03 Apr 2019 04:53  Mg     1.7     04-03    A/P  63 year old man with history of GERD, COPD, HTN, alcoholism, CHF with preserved LV function, recurrent falls and persistent atrial fibrillation.  He has CHADSVASC score of 4 but due to his alcohol abuse and multiple recurrent falls he is high risk for long term anticoagulation.  He is now status post uncomplicated RUDOLPH occlusion with Watchman device.    - Magnesium supplemented.   - Discharge home today           RADIOLOGY & ADDITIONAL TESTS:
Admission Criteria  Please admit the patient to the following service: CARDIOLOGY    Major Criteria:  - Continuous EKG monitoring is required for condition causing arrhythmia (hyperkalemia, etc)  - Significant volume load > 200 ml    Admit to: 3GUL     Patient is being admitted to the inpatient service due to high risk characteristics and need for further management/monitoring and is considered to be at a significantly increased risk of major adverse cardiac and vascular events if discharged.
PROCEDURE: RUDOLPH Occlusion via Watchman Device    ELECTROPHYSIOLOGIST: Tien Clark MD         COMPLICATIONS:  none        DISPOSITION:  observation     CONDITION: stable      Pt doing well s/p Watchman device implant.  Pt denies complaint post procedure.       MEDICATIONS  (STANDING):  aspirin enteric coated 81 milliGRAM(s) Oral daily  diltiazem    milliGRAM(s) Oral daily  folic acid 1 milliGRAM(s) Oral daily  furosemide    Tablet 40 milliGRAM(s) Oral daily  gabapentin 400 milliGRAM(s) Oral three times a day  lisinopril 5 milliGRAM(s) Oral daily  magnesium sulfate  IVPB 2 Gram(s) IV Intermittent once  metoprolol succinate  milliGRAM(s) Oral daily  multivitamin 1 Tablet(s) Oral daily  tamsulosin 0.4 milliGRAM(s) Oral at bedtime    MEDICATIONS  (PRN):  ALBUTerol    90 MICROgram(s) HFA Inhaler 2 Puff(s) Inhalation every 6 hours PRN Bronchospasm  tiotropium 18 MICROgram(s) Capsule 1 Capsule(s) Inhalation daily PRN dyspnea/bronchospasm  traZODone 50 milliGRAM(s) Oral at bedtime PRN insomnia      Allergies:  Ceclor (Rash)  Duricef (Rash)    Exam:   T(C): --  HR: 69 (04-02-19 @ 13:25) (69 - 69)  BP: 103/57 (04-02-19 @ 13:25) (103/57 - 103/57)  RR: 20 (04-02-19 @ 13:25) (20 - 20)  SpO2: 92% (04-02-19 @ 13:25) (92% - 92%)    VSS, NAD, A&O x 3  Card: S1/S2, RRR, no m/g/r  Resp: lungs CTA b/l  Abd: S/NT/ND  Groin (right only): hemostatic suture in place; site C/D/I; no bleeding, hematoma, erythema, exudate or edema  Ext: no edema; distal pulses intact    ECG: atrial fibrillation, rate 67bpm     Assessment:   63 year old man with history of GERD, COPD, HTN, alcoholism, CHF with preserved LV function, recurrent falls and persistent atrial fibrillation.  He has CHADSVASC score of 4 but due to his alcohol abuse and multiple recurrent falls he is high risk for long term anticoagulation.  He is now status post uncomplicated RUDOLPH occlusion with Watchman device.      Plan:   Bedrest x 6 hours, then OOB with assistance and progress as tolerated.   Groin suture to be removed by EP service in AM.   Radial art line to be removed once pt fully awake with stable vitals >1 hour post op.   Pending groin status: Start Xarelto 20mg PO daily tomorrow AM.   Continue other home medications.   Strict I/Os.  Please encourage incentive spirometry and ambulation once able.  Observation and monitoring on telemetry overnight with anticipated discharge in the AM and outpt follow up in 1 month.

## 2019-04-03 NOTE — DISCHARGE NOTE NURSING/CASE MANAGEMENT/SOCIAL WORK - NSDCDPATPORTLINK_GEN_ALL_CORE
You can access the [facility] Patient Portal, offered by [facility], by registering with the following website: [email or web address]

## 2019-04-05 PROBLEM — I10 ESSENTIAL (PRIMARY) HYPERTENSION: Chronic | Status: ACTIVE | Noted: 2019-03-25

## 2019-04-05 PROBLEM — K21.9 GASTRO-ESOPHAGEAL REFLUX DISEASE WITHOUT ESOPHAGITIS: Chronic | Status: ACTIVE | Noted: 2019-03-25

## 2019-04-05 PROBLEM — D64.9 ANEMIA, UNSPECIFIED: Chronic | Status: ACTIVE | Noted: 2019-03-25

## 2019-04-05 PROBLEM — J44.9 CHRONIC OBSTRUCTIVE PULMONARY DISEASE, UNSPECIFIED: Chronic | Status: ACTIVE | Noted: 2019-03-25

## 2019-04-15 ENCOUNTER — APPOINTMENT (OUTPATIENT)
Dept: FAMILY MEDICINE | Facility: CLINIC | Age: 64
End: 2019-04-15
Payer: MEDICARE

## 2019-04-15 ENCOUNTER — RX RENEWAL (OUTPATIENT)
Age: 64
End: 2019-04-15

## 2019-04-15 VITALS
HEART RATE: 86 BPM | BODY MASS INDEX: 34.54 KG/M2 | OXYGEN SATURATION: 94 % | DIASTOLIC BLOOD PRESSURE: 80 MMHG | HEIGHT: 72 IN | SYSTOLIC BLOOD PRESSURE: 134 MMHG | WEIGHT: 255 LBS

## 2019-04-15 DIAGNOSIS — R63.5 ABNORMAL WEIGHT GAIN: ICD-10-CM

## 2019-04-15 PROCEDURE — 99214 OFFICE O/P EST MOD 30 MIN: CPT

## 2019-04-18 PROBLEM — R63.5 WEIGHT GAIN: Status: ACTIVE | Noted: 2019-04-18

## 2019-04-18 NOTE — HISTORY OF PRESENT ILLNESS
[FreeTextEntry1] : follow up on insomnia/anemia/ vit d def  [de-identified] : s/p watchman procedure\par feels well no complications with procedure\par currently on anticoagulation for short period of time post procedue\par patient is trying to not drink alcohol and is interested in attending AA meeting

## 2019-04-18 NOTE — PHYSICAL EXAM
[No Acute Distress] : no acute distress [Well Nourished] : well nourished [Well Developed] : well developed [Well-Appearing] : well-appearing [PERRL] : pupils equal round and reactive to light [Normal Sclera/Conjunctiva] : normal sclera/conjunctiva [EOMI] : extraocular movements intact [Normal Oropharynx] : the oropharynx was normal [Normal Outer Ear/Nose] : the outer ears and nose were normal in appearance [No JVD] : no jugular venous distention [Supple] : supple [No Lymphadenopathy] : no lymphadenopathy [No Respiratory Distress] : no respiratory distress  [Thyroid Normal, No Nodules] : the thyroid was normal and there were no nodules present [Clear to Auscultation] : lungs were clear to auscultation bilaterally [No Accessory Muscle Use] : no accessory muscle use [Regular Rhythm] : with a regular rhythm [Normal Rate] : normal rate  [Normal S1, S2] : normal S1 and S2 [No Murmur] : no murmur heard [No Carotid Bruits] : no carotid bruits [No Abdominal Bruit] : a ~M bruit was not heard ~T in the abdomen [No Varicosities] : no varicosities [Pedal Pulses Present] : the pedal pulses are present [No Edema] : there was no peripheral edema [No Extremity Clubbing/Cyanosis] : no extremity clubbing/cyanosis [No Palpable Aorta] : no palpable aorta [Soft] : abdomen soft [Non Tender] : non-tender [No Masses] : no abdominal mass palpated [No HSM] : no HSM [Non-distended] : non-distended [Normal Bowel Sounds] : normal bowel sounds [Normal Posterior Cervical Nodes] : no posterior cervical lymphadenopathy [Normal Anterior Cervical Nodes] : no anterior cervical lymphadenopathy [No CVA Tenderness] : no CVA  tenderness [No Spinal Tenderness] : no spinal tenderness [No Joint Swelling] : no joint swelling [Grossly Normal Strength/Tone] : grossly normal strength/tone [Normal Gait] : normal gait [No Rash] : no rash [No Focal Deficits] : no focal deficits [Coordination Grossly Intact] : coordination grossly intact [Normal Affect] : the affect was normal [Normal Insight/Judgement] : insight and judgment were intact [Deep Tendon Reflexes (DTR)] : deep tendon reflexes were 2+ and symmetric

## 2019-04-22 ENCOUNTER — APPOINTMENT (OUTPATIENT)
Dept: ELECTROPHYSIOLOGY | Facility: CLINIC | Age: 64
End: 2019-04-22
Payer: MEDICARE

## 2019-04-22 VITALS
OXYGEN SATURATION: 91 % | SYSTOLIC BLOOD PRESSURE: 95 MMHG | BODY MASS INDEX: 34.54 KG/M2 | WEIGHT: 255 LBS | DIASTOLIC BLOOD PRESSURE: 60 MMHG | HEIGHT: 72 IN | HEART RATE: 61 BPM

## 2019-04-22 VITALS — DIASTOLIC BLOOD PRESSURE: 60 MMHG | SYSTOLIC BLOOD PRESSURE: 90 MMHG

## 2019-04-22 PROCEDURE — 99213 OFFICE O/P EST LOW 20 MIN: CPT

## 2019-04-26 ENCOUNTER — RX RENEWAL (OUTPATIENT)
Age: 64
End: 2019-04-26

## 2019-05-03 NOTE — PHYSICAL EXAM
[General Appearance - Well Developed] : well developed [Well Groomed] : well groomed [Normal Appearance] : normal appearance [General Appearance - Well Nourished] : well nourished [General Appearance - In No Acute Distress] : no acute distress [No Deformities] : no deformities [Auscultation Breath Sounds / Voice Sounds] : lungs were clear to auscultation bilaterally [Respiration, Rhythm And Depth] : normal respiratory rhythm and effort [Heart Sounds] : normal S1 and S2 [Abdomen Soft] : soft [Irregularly Irregular] : the rhythm was irregularly irregular [Abdomen Tenderness] : non-tender [Skin Lesions] : no skin lesions [] : no rash [No Venous Stasis] : no venous stasis [Skin Color & Pigmentation] : normal skin color and pigmentation [No Skin Ulcers] : no skin ulcer [No Xanthoma] : no  xanthoma was observed [Oriented To Time, Place, And Person] : oriented to person, place, and time [Affect] : the affect was normal [Mood] : the mood was normal [No Anxiety] : not feeling anxious [FreeTextEntry1] : left lower extremity prosthesis

## 2019-05-03 NOTE — REVIEW OF SYSTEMS
[Feeling Fatigued] : feeling fatigued [Negative] : Neurological [Headache] : no headache [Chills] : no chills [Fever] : no fever [Easy Bleeding] : no tendency for easy bleeding [Easy Bruising] : no tendency for easy bruising

## 2019-05-03 NOTE — DISCUSSION/SUMMARY
[FreeTextEntry1] : 64 year old man with history of GERD, COPD, HTN, alcoholism, CHF with preserved LV function, recurrent falls and persistent atrial fibrillation.  He has CHADSVASC score of 4 but due to his alcohol abuse and multiple recurrent falls he is high risk for long term anticoagulation.  He is now status post uncomplicated RUDOLPH occlusion with Watchman device.\par -MYA at implant displayed well seated device. \par -Will repeat 45 day post watchman MYA. If appropriate with discontinue Xarelto and maintain ASA and Plavix for 6 months then ASA alone.\par -re-educated patient on the importance of safety and not using alcohol while on short course of anticoagulation. \par \par Lisette Propper ANP-C

## 2019-05-08 ENCOUNTER — RX RENEWAL (OUTPATIENT)
Age: 64
End: 2019-05-08

## 2019-05-19 ENCOUNTER — FORM ENCOUNTER (OUTPATIENT)
Age: 64
End: 2019-05-19

## 2019-05-20 ENCOUNTER — TRANSCRIPTION ENCOUNTER (OUTPATIENT)
Age: 64
End: 2019-05-20

## 2019-05-20 ENCOUNTER — OUTPATIENT (OUTPATIENT)
Dept: OUTPATIENT SERVICES | Facility: HOSPITAL | Age: 64
LOS: 1 days | End: 2019-05-20
Payer: MEDICARE

## 2019-05-20 VITALS
TEMPERATURE: 98 F | HEART RATE: 107 BPM | WEIGHT: 253.53 LBS | HEIGHT: 72 IN | RESPIRATION RATE: 20 BRPM | OXYGEN SATURATION: 95 % | SYSTOLIC BLOOD PRESSURE: 129 MMHG | DIASTOLIC BLOOD PRESSURE: 59 MMHG

## 2019-05-20 DIAGNOSIS — I48.91 UNSPECIFIED ATRIAL FIBRILLATION: ICD-10-CM

## 2019-05-20 DIAGNOSIS — Z89.512 ACQUIRED ABSENCE OF LEFT LEG BELOW KNEE: Chronic | ICD-10-CM

## 2019-05-20 LAB
ANION GAP SERPL CALC-SCNC: 12 MMOL/L — SIGNIFICANT CHANGE UP (ref 5–17)
APTT BLD: 30 SEC — SIGNIFICANT CHANGE UP (ref 27.5–36.3)
BUN SERPL-MCNC: 16 MG/DL — SIGNIFICANT CHANGE UP (ref 8–20)
CALCIUM SERPL-MCNC: 9.4 MG/DL — SIGNIFICANT CHANGE UP (ref 8.6–10.2)
CHLORIDE SERPL-SCNC: 100 MMOL/L — SIGNIFICANT CHANGE UP (ref 98–107)
CO2 SERPL-SCNC: 25 MMOL/L — SIGNIFICANT CHANGE UP (ref 22–29)
CREAT SERPL-MCNC: 0.72 MG/DL — SIGNIFICANT CHANGE UP (ref 0.5–1.3)
GLUCOSE SERPL-MCNC: 111 MG/DL — SIGNIFICANT CHANGE UP (ref 70–115)
HCT VFR BLD CALC: 38 % — LOW (ref 42–52)
HGB BLD-MCNC: 12.8 G/DL — LOW (ref 14–18)
INR BLD: 1.09 RATIO — SIGNIFICANT CHANGE UP (ref 0.88–1.16)
MCHC RBC-ENTMCNC: 32.1 PG — HIGH (ref 27–31)
MCHC RBC-ENTMCNC: 33.7 G/DL — SIGNIFICANT CHANGE UP (ref 32–36)
MCV RBC AUTO: 95.2 FL — HIGH (ref 80–94)
PLATELET # BLD AUTO: 177 K/UL — SIGNIFICANT CHANGE UP (ref 150–400)
POTASSIUM SERPL-MCNC: 4.4 MMOL/L — SIGNIFICANT CHANGE UP (ref 3.5–5.3)
POTASSIUM SERPL-SCNC: 4.4 MMOL/L — SIGNIFICANT CHANGE UP (ref 3.5–5.3)
PROTHROM AB SERPL-ACNC: 12.6 SEC — SIGNIFICANT CHANGE UP (ref 10–12.9)
RBC # BLD: 3.99 M/UL — LOW (ref 4.6–6.2)
RBC # FLD: 15.4 % — SIGNIFICANT CHANGE UP (ref 11–15.6)
SODIUM SERPL-SCNC: 137 MMOL/L — SIGNIFICANT CHANGE UP (ref 135–145)
WBC # BLD: 7.8 K/UL — SIGNIFICANT CHANGE UP (ref 4.8–10.8)
WBC # FLD AUTO: 7.8 K/UL — SIGNIFICANT CHANGE UP (ref 4.8–10.8)

## 2019-05-20 PROCEDURE — 93320 DOPPLER ECHO COMPLETE: CPT

## 2019-05-20 PROCEDURE — 36415 COLL VENOUS BLD VENIPUNCTURE: CPT

## 2019-05-20 PROCEDURE — 93312 ECHO TRANSESOPHAGEAL: CPT

## 2019-05-20 PROCEDURE — 93005 ELECTROCARDIOGRAM TRACING: CPT

## 2019-05-20 PROCEDURE — 93312 ECHO TRANSESOPHAGEAL: CPT | Mod: 26

## 2019-05-20 PROCEDURE — 85027 COMPLETE CBC AUTOMATED: CPT

## 2019-05-20 PROCEDURE — 93320 DOPPLER ECHO COMPLETE: CPT | Mod: 26

## 2019-05-20 PROCEDURE — 80048 BASIC METABOLIC PNL TOTAL CA: CPT

## 2019-05-20 PROCEDURE — 76376 3D RENDER W/INTRP POSTPROCES: CPT | Mod: 26

## 2019-05-20 PROCEDURE — 85730 THROMBOPLASTIN TIME PARTIAL: CPT

## 2019-05-20 PROCEDURE — 85610 PROTHROMBIN TIME: CPT

## 2019-05-20 PROCEDURE — 93325 DOPPLER ECHO COLOR FLOW MAPG: CPT | Mod: 26

## 2019-05-20 PROCEDURE — 93325 DOPPLER ECHO COLOR FLOW MAPG: CPT

## 2019-05-20 PROCEDURE — 93010 ELECTROCARDIOGRAM REPORT: CPT

## 2019-05-20 RX ORDER — CLOPIDOGREL BISULFATE 75 MG/1
75 TABLET, FILM COATED ORAL DAILY
Refills: 0 | Status: DISCONTINUED | OUTPATIENT
Start: 2019-05-20 | End: 2019-06-04

## 2019-05-20 RX ORDER — CLOPIDOGREL BISULFATE 75 MG/1
1 TABLET, FILM COATED ORAL
Qty: 90 | Refills: 0
Start: 2019-05-20

## 2019-05-20 NOTE — DISCHARGE NOTE NURSING/CASE MANAGEMENT/SOCIAL WORK - NSDCDPATPORTLINK_GEN_ALL_CORE
Telephone Encounter by Juliette Ruiz MD at 07/18/18 01:54 PM     Author:  Juliette Ruiz MD Service:  (none) Author Type:  Physician     Filed:  07/18/18 01:54 PM Encounter Date:  7/13/2018 Status:  Signed     :  Juliette Ruiz MD (Physician)            If pt has refills already to get back to me in office, please hold refill request.[KD1.1M]       Revision History        User Key Date/Time User Provider Type Action    > KD1.1 07/18/18 01:54 PM Juliette Ruiz MD Physician Sign    M - Manual             You can access the [facility] Portal, offered by [facility], by registering with the following website: [email or web address]

## 2019-05-20 NOTE — H&P PST ADULT - NSICDXPASTMEDICALHX_GEN_ALL_CORE_FT
PAST MEDICAL HISTORY:  Alcohol abuse     Alcohol withdrawal     Anemia     CHF (congestive heart failure)     Chronic atrial fibrillation     Chronic obstructive pulmonary disease (COPD)     Cirrhosis     Collapsed lung     Emphysema of lung     Falls     GERD (gastroesophageal reflux disease)     Hypertension     Meningitis     Poor historian     Presence of Watchman left atrial appendage closure device

## 2019-05-20 NOTE — DISCHARGE NOTE PROVIDER - CARE PROVIDER_API CALL
Tien Clark)  Cardiology; Internal Medicine  [address] [facility], [address] [place] [address]  Phone: [phone]  Fax: [phone]  Follow Up Time: 2 weeks

## 2019-05-20 NOTE — DISCHARGE NOTE PROVIDER - NSDCCPTREATMENT_GEN_ALL_CORE_FT
PRINCIPAL PROCEDURE  Procedure: Transesophageal echocardiogram  Findings and Treatment: no significant leak around watchman; see full report

## 2019-05-20 NOTE — DISCHARGE NOTE PROVIDER - NSDCCPCAREPLAN_GEN_ALL_CORE_FT
PRINCIPAL DISCHARGE DIAGNOSIS  Diagnosis: Afib  Assessment and Plan of Treatment: S/P MYA watchman device no significant leak

## 2019-05-20 NOTE — DISCHARGE NOTE PROVIDER - HOSPITAL COURSE
64 year old man with history of GERD, COPD, HTN, alcoholism, CHF with preserved LV function, recurrent falls and persistent atrial fibrillation.  He has CHADSVASC score of 4 but due to his alcohol abuse and multiple recurrent falls he is high risk for long term anticoagulation.  Now S/P MYA, no significant leak around watchman device. Will d/c xarelto and start plavix 75mg po daily. Patient awake and alert without complaints. Denies chest pain, sob, palps.             Neuro: A&OX3    Lungs: CTA B/L    CV: S1, S2, no murmur, Irreg    Abd: Soft    Extremity: + distal pulses            A/P: 64y Male s/p MYA    1. Do not drive or make important decisions today    2. Continue current meds    3. Follow up as an outpatient with cardiologist    4. D/C xarelto. Start plavix 75mg po daily x 6 months    5. Discharge at 1430

## 2019-05-20 NOTE — H&P PST ADULT - ASSESSMENT
63 yo male s/p watchman device implant, here for MYA to assess RUDOLPH  -Proceed with procedure as planned

## 2019-05-20 NOTE — H&P PST ADULT - HISTORY OF PRESENT ILLNESS
64 year old man with history of GERD, COPD, HTN, alcoholism, CHF with preserved LV function, recurrent falls and persistent atrial fibrillation.  He has CHADSVASC score of 4 but due to his alcohol abuse and multiple recurrent falls he is high risk for long term anticoagulation.  Now S/P RUDOLPH occlusion via Watchman device, presents for MYA to assess RUDOLPH.  Pt denies any complaints at this time.        Cardiology summary:   Echo: 11/6/17, LVEF 50-55%, severely increased LV size, severely dilated RA, severely enlarged LA, mod to severe MR, mild-mod MR

## 2019-05-26 ENCOUNTER — INPATIENT (INPATIENT)
Facility: HOSPITAL | Age: 64
LOS: 5 days | Discharge: HOME | DRG: 394 | End: 2019-06-01
Attending: INTERNAL MEDICINE | Admitting: STUDENT IN AN ORGANIZED HEALTH CARE EDUCATION/TRAINING PROGRAM
Payer: MEDICARE

## 2019-05-26 VITALS
RESPIRATION RATE: 24 BRPM | DIASTOLIC BLOOD PRESSURE: 71 MMHG | SYSTOLIC BLOOD PRESSURE: 111 MMHG | OXYGEN SATURATION: 100 % | HEART RATE: 128 BPM

## 2019-05-26 DIAGNOSIS — D62 ACUTE POSTHEMORRHAGIC ANEMIA: ICD-10-CM

## 2019-05-26 DIAGNOSIS — Z95.818 PRESENCE OF OTHER CARDIAC IMPLANTS AND GRAFTS: Chronic | ICD-10-CM

## 2019-05-26 DIAGNOSIS — I25.10 ATHEROSCLEROTIC HEART DISEASE OF NATIVE CORONARY ARTERY WITHOUT ANGINA PECTORIS: ICD-10-CM

## 2019-05-26 DIAGNOSIS — Z89.512 ACQUIRED ABSENCE OF LEFT LEG BELOW KNEE: Chronic | ICD-10-CM

## 2019-05-26 DIAGNOSIS — S88.119A COMPLETE TRAUMATIC AMPUTATION AT LEVEL BETWEEN KNEE AND ANKLE, UNSPECIFIED LOWER LEG, INITIAL ENCOUNTER: Chronic | ICD-10-CM

## 2019-05-26 DIAGNOSIS — K92.2 GASTROINTESTINAL HEMORRHAGE, UNSPECIFIED: ICD-10-CM

## 2019-05-26 LAB
ALBUMIN SERPL ELPH-MCNC: 3.3 G/DL — SIGNIFICANT CHANGE UP (ref 3.3–5.2)
ALP SERPL-CCNC: 105 U/L — SIGNIFICANT CHANGE UP (ref 40–120)
ALT FLD-CCNC: 26 U/L — SIGNIFICANT CHANGE UP
ANION GAP SERPL CALC-SCNC: 11 MMOL/L — SIGNIFICANT CHANGE UP (ref 5–17)
ANION GAP SERPL CALC-SCNC: 16 MMOL/L — SIGNIFICANT CHANGE UP (ref 5–17)
APTT BLD: 23.9 SEC — LOW (ref 27.5–36.3)
AST SERPL-CCNC: 23 U/L — SIGNIFICANT CHANGE UP
BASOPHILS # BLD AUTO: 0 K/UL — SIGNIFICANT CHANGE UP (ref 0–0.2)
BASOPHILS NFR BLD AUTO: 0.1 % — SIGNIFICANT CHANGE UP (ref 0–2)
BILIRUB SERPL-MCNC: 0.6 MG/DL — SIGNIFICANT CHANGE UP (ref 0.4–2)
BLD GP AB SCN SERPL QL: SIGNIFICANT CHANGE UP
BUN SERPL-MCNC: 28 MG/DL — HIGH (ref 8–20)
BUN SERPL-MCNC: 28 MG/DL — HIGH (ref 8–20)
CALCIUM SERPL-MCNC: 8.5 MG/DL — LOW (ref 8.6–10.2)
CALCIUM SERPL-MCNC: 8.6 MG/DL — SIGNIFICANT CHANGE UP (ref 8.6–10.2)
CHLORIDE SERPL-SCNC: 101 MMOL/L — SIGNIFICANT CHANGE UP (ref 98–107)
CHLORIDE SERPL-SCNC: 99 MMOL/L — SIGNIFICANT CHANGE UP (ref 98–107)
CO2 SERPL-SCNC: 21 MMOL/L — LOW (ref 22–29)
CO2 SERPL-SCNC: 23 MMOL/L — SIGNIFICANT CHANGE UP (ref 22–29)
CREAT SERPL-MCNC: 1.17 MG/DL — SIGNIFICANT CHANGE UP (ref 0.5–1.3)
CREAT SERPL-MCNC: 1.61 MG/DL — HIGH (ref 0.5–1.3)
EOSINOPHIL # BLD AUTO: 0 K/UL — SIGNIFICANT CHANGE UP (ref 0–0.5)
EOSINOPHIL NFR BLD AUTO: 0.2 % — SIGNIFICANT CHANGE UP (ref 0–5)
GLUCOSE SERPL-MCNC: 171 MG/DL — HIGH (ref 70–115)
GLUCOSE SERPL-MCNC: 176 MG/DL — HIGH (ref 70–115)
HCT VFR BLD CALC: 25.1 % — LOW (ref 42–52)
HCT VFR BLD CALC: 32.9 % — LOW (ref 42–52)
HGB BLD-MCNC: 10.9 G/DL — LOW (ref 14–18)
HGB BLD-MCNC: 8.2 G/DL — LOW (ref 14–18)
INR BLD: 1.13 RATIO — SIGNIFICANT CHANGE UP (ref 0.88–1.16)
LYMPHOCYTES # BLD AUTO: 1.1 K/UL — SIGNIFICANT CHANGE UP (ref 1–4.8)
LYMPHOCYTES # BLD AUTO: 8.1 % — LOW (ref 20–55)
MCHC RBC-ENTMCNC: 29.9 PG — SIGNIFICANT CHANGE UP (ref 27–31)
MCHC RBC-ENTMCNC: 31.3 PG — HIGH (ref 27–31)
MCHC RBC-ENTMCNC: 32.7 G/DL — SIGNIFICANT CHANGE UP (ref 32–36)
MCHC RBC-ENTMCNC: 33.1 G/DL — SIGNIFICANT CHANGE UP (ref 32–36)
MCV RBC AUTO: 90.1 FL — SIGNIFICANT CHANGE UP (ref 80–94)
MCV RBC AUTO: 95.8 FL — HIGH (ref 80–94)
MONOCYTES # BLD AUTO: 0.6 K/UL — SIGNIFICANT CHANGE UP (ref 0–0.8)
MONOCYTES NFR BLD AUTO: 4.1 % — SIGNIFICANT CHANGE UP (ref 3–10)
NEUTROPHILS # BLD AUTO: 12 K/UL — HIGH (ref 1.8–8)
NEUTROPHILS NFR BLD AUTO: 86.7 % — HIGH (ref 37–73)
NT-PROBNP SERPL-SCNC: 660 PG/ML — HIGH (ref 0–300)
OB PNL STL: POSITIVE
PLATELET # BLD AUTO: 196 K/UL — SIGNIFICANT CHANGE UP (ref 150–400)
PLATELET # BLD AUTO: 301 K/UL — SIGNIFICANT CHANGE UP (ref 150–400)
POTASSIUM SERPL-MCNC: 4.6 MMOL/L — SIGNIFICANT CHANGE UP (ref 3.5–5.3)
POTASSIUM SERPL-MCNC: 5.2 MMOL/L — SIGNIFICANT CHANGE UP (ref 3.5–5.3)
POTASSIUM SERPL-SCNC: 4.6 MMOL/L — SIGNIFICANT CHANGE UP (ref 3.5–5.3)
POTASSIUM SERPL-SCNC: 5.2 MMOL/L — SIGNIFICANT CHANGE UP (ref 3.5–5.3)
PROT SERPL-MCNC: 5.9 G/DL — LOW (ref 6.6–8.7)
PROTHROM AB SERPL-ACNC: 13 SEC — HIGH (ref 10–12.9)
RBC # BLD: 2.62 M/UL — LOW (ref 4.6–6.2)
RBC # BLD: 3.65 M/UL — LOW (ref 4.6–6.2)
RBC # FLD: 15.1 % — SIGNIFICANT CHANGE UP (ref 11–15.6)
RBC # FLD: 17.2 % — HIGH (ref 11–15.6)
SODIUM SERPL-SCNC: 135 MMOL/L — SIGNIFICANT CHANGE UP (ref 135–145)
SODIUM SERPL-SCNC: 136 MMOL/L — SIGNIFICANT CHANGE UP (ref 135–145)
TROPONIN T SERPL-MCNC: <0.01 NG/ML — SIGNIFICANT CHANGE UP (ref 0–0.06)
WBC # BLD: 11.9 K/UL — HIGH (ref 4.8–10.8)
WBC # BLD: 13.9 K/UL — HIGH (ref 4.8–10.8)
WBC # FLD AUTO: 11.9 K/UL — HIGH (ref 4.8–10.8)
WBC # FLD AUTO: 13.9 K/UL — HIGH (ref 4.8–10.8)

## 2019-05-26 PROCEDURE — 70450 CT HEAD/BRAIN W/O DYE: CPT | Mod: 26

## 2019-05-26 PROCEDURE — 71045 X-RAY EXAM CHEST 1 VIEW: CPT | Mod: 26

## 2019-05-26 PROCEDURE — 99223 1ST HOSP IP/OBS HIGH 75: CPT | Mod: GC

## 2019-05-26 PROCEDURE — 99291 CRITICAL CARE FIRST HOUR: CPT

## 2019-05-26 PROCEDURE — 93010 ELECTROCARDIOGRAM REPORT: CPT

## 2019-05-26 PROCEDURE — 74174 CTA ABD&PLVS W/CONTRAST: CPT | Mod: 26

## 2019-05-26 RX ORDER — IPRATROPIUM/ALBUTEROL SULFATE 18-103MCG
3 AEROSOL WITH ADAPTER (GRAM) INHALATION EVERY 24 HOURS
Refills: 0 | Status: DISCONTINUED | OUTPATIENT
Start: 2019-05-26 | End: 2019-05-28

## 2019-05-26 RX ORDER — PANTOPRAZOLE SODIUM 20 MG/1
40 TABLET, DELAYED RELEASE ORAL EVERY 12 HOURS
Refills: 0 | Status: DISCONTINUED | OUTPATIENT
Start: 2019-05-26 | End: 2019-05-27

## 2019-05-26 RX ORDER — ACETAMINOPHEN 500 MG
1000 TABLET ORAL ONCE
Refills: 0 | Status: COMPLETED | OUTPATIENT
Start: 2019-05-26 | End: 2019-05-26

## 2019-05-26 RX ADMIN — Medication 400 MILLIGRAM(S): at 16:00

## 2019-05-26 RX ADMIN — Medication 1000 MILLIGRAM(S): at 16:10

## 2019-05-26 NOTE — H&P ADULT - ASSESSMENT
65 yo male with pmhx of Afib on CardioXT s/p watchman on 4/2/19, CHF, and COPD presents with bright red blood per rectum admitted for likely lower GI bleed.     Admit to stepdown unit, medicine resident service, Dr. Charles Khan q4h, diet clears/NPO after midnight, activity as tolerated, dvt ppx: none    Bright red blood per rectum likely 2/2 Lower GI bleed  -s/p PRBC 4 units, plasma 1 unit, platelets 1 unit in ED  -H/H responded from 8.2 to 10.9  -    Colitis complicated by sepsis 63 yo male with pmhx of Afib on CardioXT s/p watchman on 4/2/19, CHF, and COPD presents with bright red blood per rectum admitted for likely lower GI bleed.     Admit to stepdown unit, medicine resident service, Dr. Charles Khan q4h, diet clears/NPO after midnight, activity as tolerated, dvt ppx: none    Bright red blood per rectum likely 2/2 Lower GI bleed  -s/p PRBC 4 units, plasma 1 unit, platelets 1 unit in ED  -H/H responded from 8.2 to 10.9  -currently asymptomatic without further episodes since this morning  -CT abd shows right-sided colitis/cecitis  -GI consult pending      Colitis complicated by sepsis  -CT abd shows right sided colitis/cecitis and pt has downtrending wbc ct.   -start clear liquids, npo after midnight for possible procedure    Atrial fibrillation  -will hold Cardia XT given soft bp 63 yo male with pmhx of Afib on CardioXT s/p watchman on 4/2/19, CHF, and COPD presents with bright red blood per rectum admitted for likely lower GI bleed.     Admit to stepdown unit, medicine resident service, Dr. Charles Khan q4h, diet clears/NPO after midnight, activity as tolerated, dvt ppx: none    Bright red blood per rectum likely 2/2 Lower GI bleed  -s/p PRBC 4 units, plasma 1 unit, platelets 1 unit in ED  -H/H responded from 8.2 to 10.9  -currently asymptomatic without further episodes since this morning  -CT abd shows right-sided colitis/cecitis  -serial H/H q6h for now  -start protonix 40 mg iv bid  -GI consult pending    Colitis   -CT abd shows right sided colitis/cecitis and pt has downtrending wbc ct.   -start clear liquids, npo after midnight for possible procedure  -supportive care  -consider abx therapy if wbc ct worsens or pt becomes symptomatic    Atrial fibrillation  -will hold Cardia XT given soft bp  -continue to monitor    Chronic CHF  -will hold lasix 40 mg po qdaily hold med given soft bp  -strict I/Os, daily weights    COPD  -not on home O2  -c/w home med, Albuterol neb q24h    Leukocytosis  -likely reactive, downtrending  -will repeat in am    Elevated BUN  -likely due to hypovolemia  -will repeat in AM    Preventive measure  -dvt ppx: holding AC due to bleed risk, vcd boots 65 yo male with pmhx of Afib on CardioXT s/p watchman on 4/2/19, CHF, and COPD presents with bright red blood per rectum admitted for likely lower GI bleed.     Admit to stepdown unit, medicine resident service, Dr. Charles Khan q4h, diet clears/NPO after midnight, activity as tolerated, dvt ppx: none    Bright red blood per rectum likely 2/2 Lower GI bleed  -s/p PRBC 4 units, plasma 1 unit, platelets 1 unit in ED  -H/H responded from 8.2 to 10.9  -currently asymptomatic without further episodes since this morning  -CT abd shows right-sided colitis/cecitis  -serial H/H q6h for now  -start protonix 40 mg iv bid  -GI consult pending    Colitis   -CT abd shows right sided colitis/cecitis and pt has downtrending wbc ct.   -start clear liquids, npo after midnight for possible procedure  -start ciprofloxacin 400 mg iv bid  -start metronidazole 500 mg iv q8h    Atrial fibrillation  -will hold Cardia XT given soft bp  -continue to monitor    Chronic CHF  -will hold lasix 40 mg po qdaily hold med given soft bp  -strict I/Os, daily weights    COPD  -not on home O2  -c/w home med, Albuterol neb q24h    Leukocytosis  -likely reactive, downtrending  -will repeat in am    Elevated BUN  -likely due to hypovolemia  -will repeat in AM    Preventive measure  -dvt ppx: holding AC due to bleed risk, vcd boots

## 2019-05-26 NOTE — H&P ADULT - RS GEN PE MLT RESP DETAILS PC
no rhonchi/no wheezes/respirations non-labored/no rales/no intercostal retractions/clear to auscultation bilaterally/breath sounds equal/good air movement

## 2019-05-26 NOTE — CONSULT NOTE ADULT - ASSESSMENT
65 y/o male pmhx CAD ( daily ASA) , CHF, HTN, HLD, GERD, BPH, afib ( has been off Xarelto due for 6 weeks now) presented to ED today w/ complaints of bright red blood per rectum. Pt admitted w/ lower GI bleed, acute blood loss anemia, At this time patient is s/p 4 PRBC, has remained HD stable in ED, w/out any signs of active bleeding, has had no BM's aince arrival to hospital, CT w/out any signs of active bleeding. Patient does not require an ICU admission, okay to admit to step down. Please re consult if patients condition deteriorates.    Case discussed w/ ICU Dr. Oliva

## 2019-05-26 NOTE — H&P ADULT - NSHPPHYSICALEXAM_GEN_ALL_CORE
Vital Signs Last 24 Hrs  T(C): 36.7 (26 May 2019 20:15), Max: 37 (26 May 2019 17:07)  T(F): 98.1 (26 May 2019 20:15), Max: 98.6 (26 May 2019 17:07)  HR: 106 (26 May 2019 20:15) (87 - 148)  BP: 129/56 (26 May 2019 20:15) (61/29 - 144/60)  RR: 20 (26 May 2019 20:15) (18 - 24)  SpO2: 100% (26 May 2019 20:15) (94% - 100%) on NC 2L

## 2019-05-26 NOTE — ED PROVIDER NOTE - CLINICAL SUMMARY MEDICAL DECISION MAKING FREE TEXT BOX
Pt. with sudden onset of SOB and large amount of rectal bleeding earlier today. Pt. hypotensive and tachycardic. Suspecting GI bleed. Pt. requiring blood.

## 2019-05-26 NOTE — ED ADULT NURSE REASSESSMENT NOTE - NS ED NURSE REASSESS COMMENT FT1
1st PRBC completed at 1514  2nd PRBC started at 1515.  Pt remains A&OX4.  Remains on cardiac monitor.

## 2019-05-26 NOTE — H&P ADULT - NSHPSOCIALHISTORY_GEN_ALL_CORE
Pt drinks occasionally, CAGE 0/4. Smokes 1ppd for 30 yrs. Denies illicit drug use. Lives alone. Retired, was a .

## 2019-05-26 NOTE — CONSULT NOTE ADULT - SUBJECTIVE AND OBJECTIVE BOX
Patient is a 64y old  Male who presents with a chief complaint of     65 y/o male pmhx CAD ( daily ASA) , CHF, HTN, HLD, GERD, BPH, afib ( has been off Xarelto due for 6 weeks now) presented to ED today w/ complaints of bright red blood per rectum  and SOB. Pt states he had large bright red blood BM this morning. On arrival to ED was hypotensive and tachycardic. Hg ~8, has not had any BM's since arrival to ED. Now s/p 4 PRBC and 2 FFP. CT abd/pelvis w/ contrast w/ no signs of active bleeding. He denies any SOB/CP, N/V, fever/chills. Admits to mild diffuse abdominal pain      PAST MEDICAL & SURGICAL HISTORY:      Review of Systems:  CONSTITUTIONAL: No fever, chills, or fatigue  EYES: No eye pain, visual disturbances, or discharge  ENMT:  No difficulty hearing, tinnitus, vertigo; No sinus or throat pain  NECK: No pain or stiffness  RESPIRATORY: No cough, wheezing, chills or hemoptysis; No shortness of breath  CARDIOVASCULAR: No chest pain, palpitations, dizziness, or leg swelling  GASTROINTESTINAL: + abd pain. No nausea, vomiting, or hematemesis; No diarrhea or constipation. + hematochezia.  GENITOURINARY: No dysuria, frequency, hematuria, or incontinence  NEUROLOGICAL: No headaches, memory loss, loss of strength, numbness, or tremors  SKIN: No itching, burning, rashes, or lesions   MUSCULOSKELETAL: No joint pain or swelling; No muscle, back, or extremity pain  PSYCHIATRIC: No depression, anxiety, mood swings, or difficulty sleeping      Medications:                                  ICU Vital Signs Last 24 Hrs  T(C): 37 (26 May 2019 17:07), Max: 37 (26 May 2019 17:07)  T(F): 98.6 (26 May 2019 17:07), Max: 98.6 (26 May 2019 17:07)  HR: 95 (26 May 2019 19:15) (87 - 148)  BP: 144/60 (26 May 2019 19:15) (61/29 - 144/60)  BP(mean): --  ABP: --  ABP(mean): --  RR: 18 (26 May 2019 19:15) (18 - 24)  SpO2: 100% (26 May 2019 19:15) (94% - 100%)          I&O's Detail        LABS:                        8.2    13.9  )-----------( 301      ( 26 May 2019 14:57 )             25.1     05-26    136  |  99  |  28.0<H>  ----------------------------<  176<H>  4.6   |  21.0<L>  |  1.61<H>    Ca    8.5<L>      26 May 2019 14:57    TPro  5.9<L>  /  Alb  3.3  /  TBili  0.6  /  DBili  x   /  AST  23  /  ALT  26  /  AlkPhos  105  05-26      CARDIAC MARKERS ( 26 May 2019 14:57 )  x     / <0.01 ng/mL / x     / x     / x          CAPILLARY BLOOD GLUCOSE        PT/INR - ( 26 May 2019 14:57 )   PT: 13.0 sec;   INR: 1.13 ratio         PTT - ( 26 May 2019 14:57 )  PTT:23.9 sec    CULTURES:      Physical Examination:    General: No acute distress.  Alert, oriented, interactive, nonfocal    HEENT: Pupils equal, reactive to light.  Symmetric.    PULM: Clear to auscultation bilaterally, no significant sputum production    CVS: Regular rate and rhythm, no murmurs, rubs, or gallops    ABD: Soft, nondistended, + diffuse tenderness, normoactive bowel sounds, no masses    EXT: No edema, nontender. s/p L AKA     SKIN: Warm and well perfused, no rashes noted.    NEURO: A&Ox3, strength 5/5 all extremities, cranial nerves grossly intact, no focal deficits    RADIOLOGY: < from: CT Angio Abdomen and Pelvis w/ IV Cont (05.26.19 @ 18:10) >     EXAM:  CT ANGIO ABD PELV (W)AW IC                          PROCEDURE DATE:  05/26/2019          INTERPRETATION:    CTA (CT ANGIOGRAPHY) of the abdomen and pelvis    INDICATION: Gastrointestinal bleeding. Evaluate for gastrointestinal   bleed.    TECHNIQUE: CTA (CT ANGIOGRAPHY) of the abdomen and pelvis was performed.   A preliminary scan was performed without intravenous contrast material.   Intravenous contrast was then administered and images were obtained   during the arterial phase and during a delayed phase. Image   postprocessing was performed including the production of axial, coronal,   and sagittal multiplanar reformatted images as well as sagittal and   coronal maximum intensity projections (MIPs).     CONTRAST: 91 cc of Omnipaque 350 was demonstrated intravenously. 0 cc   discarded.    COMPARISON: None.    FINDINGS:    Active GI bleeding: None    Others:   lower chest: Unremarkable.    Liver: Question cirrhosis. No focal mass.     Biliary system: Gallbladder is normal in size. No calcified gallstones.   No biliary ductal dilatation.    Pancreas: Unremarkable.    Spleen: Punctate granuloma.    Adrenal glands: Unremarkable.    Kidneys: Normal size.. No hydronephrosis. Nonobstructing intrarenal   calculi.     Urinary Bladder: Unremarkable.    Reproductive organs: Unremarkable.     Bowel/Peritoneum: Thickened cecum and distal ascending colon.. Normal   appendix. Remaining bowel unremarkable. No ascites or extraluminal gas.     Lymph nodes: No lymphadenopathy.    Aorta/IVC: Diffusely atherosclerotic. Mesenteric arteries patent with   single origin of the SMA and celiac.    Abdominal wall: No hernia.    Bones/Soft tissues: No acute abnormality.          IMPRESSION:    No active GI bleeding.    Right-sided colitis/cecitis. Follow-up colonoscopy after resolution of   symptoms suggested.        < end of copied text >       TIME SPENT:  45 min  Evaluating/treating patient, reviewing data/labs/imaging, discussing case with multidisciplinary team, discussing plan/goals of care with patient/family. Non-inclusive of procedure time.

## 2019-05-26 NOTE — ED ADULT NURSE NOTE - OBJECTIVE STATEMENT
Pt BIBA for SOB and bleeding from rectum, bright red blood since this morning with sob which has been worsening since this AM. Pt received 200cc of IVF N/S via EMS. Pt is currently A&OX4, on tele monitor. VS /71 RR 24 SP02 100%  on NRM.  Left BKA. Pt BIBA for SOB and bleeding from rectum, bright red blood since this morning with sob which has been worsening since this AM. Pt received 200cc of IVF N/S via EMS. Pt is currently A&OX4, on tele monitor. VS /71 RR 24 SP02 100%  on NRM.  Left BKA.  Rapid blood transfusion initiated.  Pt states he only takes aspirin. No blood thinners. Pt BIBA for SOB and bleeding from rectum, bright red blood since this morning with sob which has been worsening since this AM. Pt received 200cc of IVF N/S via EMS. Pt is currently A&OX4, on tele monitor. VS /71 RR 24 SP02 100%  on NRM.  Left BKA.  Rapid blood transfusion initiated.  Pt states he only takes aspirin. Call to pharmacy reveals, pt takes Xarelto, Plavix and Aspirin.

## 2019-05-26 NOTE — ED PROVIDER NOTE - PROGRESS NOTE DETAILS
Emergent blood release ordered due to hypotension and SOB. Concerned for rapib blood lost. ICU consulted. No active GI bleeding. Pt. not accepted to ICU. Pt. to go to step down unit. Spoke with hospitalist on call for admission.

## 2019-05-26 NOTE — H&P ADULT - NSICDXPASTSURGICALHX_GEN_ALL_CORE_FT
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

## 2019-05-26 NOTE — H&P ADULT - ATTENDING COMMENTS
64yoM hx Afib, previously on Xarelto, now s/p Watchman procedure 4/2019 and plavix, currently only on ASA presenting with multiple episodes of rectal bleeding with admission Hgb of 8 (alternate MRN shows previously was ~12) and hypotension on admission s/p 4pRBC with improvements in BP. CT showing right sided colitis and cecitis.  Admit to step down for frequent BP check given recent hypotension, hold ASA and lasix, trend CBC q6-hr, GI eval, IV antibiotics for colitis.

## 2019-05-26 NOTE — PHARMACOTHERAPY INTERVENTION NOTE - COMMENTS
Patient a poor historian. Called pharmacy to obtain full medication list. Is currently on aspirin, clopidogrel, and Xarelto. As per pharmacy, patient has regularly picked up Xarelto since 9/2018.

## 2019-05-26 NOTE — ED ADULT NURSE NOTE - NSIMPLEMENTINTERV_GEN_ALL_ED
Implemented All Fall with Harm Risk Interventions:  [place] to call system. Call bell, personal items and telephone within reach. Instruct patient to call for assistance. Room bathroom lighting operational. Non-slip footwear when patient is off stretcher. Physically safe environment: no spills, clutter or unnecessary equipment. Stretcher in lowest position, wheels locked, appropriate side rails in place. Provide visual cue, wrist band, yellow gown, etc. Monitor gait and stability. Monitor for mental status changes and reorient to person, place, and time. Review medications for side effects contributing to fall risk. Reinforce activity limits and safety measures with patient and family. Provide visual clues: red socks.

## 2019-05-26 NOTE — H&P ADULT - HISTORY OF PRESENT ILLNESS
63 yo male with pmhx of Afib on CardioXT s/p watchman on 4/2/19, CHF on lasix, and COPD (not on home O2) presents to ED biba due to bright red blood per rectum. Pt reports that at 5 am today he had a BM that was largely bright red blood. He says that toilet was filled with blood. He had 2 further episodes consecutively. He felt lightheaded and weak. He also felt SOB that was not relieved by rest. He could not walk and needed to sit down after which he called EMS. He has had no previous episodes. He denies mucous or stool in toilet. He received 4 units PRBC in ED and says he felt significantly better. He denies fever, chills, CP, palpitations, abd pain, N/V, or calf pain. 63 yo male with pmhx of Afib on CardioXT s/p watchman on 4/2/19, CHF on lasix, and COPD (not on home O2) presents to ED biba due to bright red blood per rectum. Pt reports that at 5 am today he had a BM that was largely bright red blood. He says that toilet was filled with blood. He had 2 further episodes consecutively. He felt lightheaded and weak. He also felt SOB that was not relieved by rest. He could not walk and needed to sit down after which he called EMS. He has had no previous episodes. He denies mucous or stool in toilet. On admission, was hypotension, Hgb was 8 (had different MRN that shows Hgb was previously 12), he received 4 units PRBC in ED and says he felt significantly better. He denies fever, chills, CP, palpitations, abd pain, N/V, or calf pain.

## 2019-05-26 NOTE — ED ADULT NURSE REASSESSMENT NOTE - NS ED NURSE REASSESS COMMENT FT1
patient given food and tolerated well. Hospitalist at bedside for admission. patient remains on monitor

## 2019-05-26 NOTE — ED ADULT NURSE NOTE - CHPI ED NUR SYMPTOMS NEG
no vomiting/no hematuria/no dysuria/no fever/no nausea/no burning urination/no chills/no abdominal distension

## 2019-05-26 NOTE — ED ADULT NURSE REASSESSMENT NOTE - NS ED NURSE REASSESS COMMENT FT1
3rd PRBC completed at 1549  4th PRBC started at 1550  Pt is A&OX4  Interacting with staff.  States he feels more comfortable.

## 2019-05-26 NOTE — ED ADULT NURSE REASSESSMENT NOTE - NS ED NURSE REASSESS COMMENT FT1
ptrec'd at this time ptalert and coopertive. platlets infusing wellto right ac nasalo2 in use patient remains on monitor patient deneis any pain at presen ttime. will monitormandchart changes

## 2019-05-27 DIAGNOSIS — K62.5 HEMORRHAGE OF ANUS AND RECTUM: ICD-10-CM

## 2019-05-27 DIAGNOSIS — K52.9 NONINFECTIVE GASTROENTERITIS AND COLITIS, UNSPECIFIED: ICD-10-CM

## 2019-05-27 LAB
ALBUMIN SERPL ELPH-MCNC: 2.9 G/DL — LOW (ref 3.3–5.2)
ALBUMIN SERPL ELPH-MCNC: 3.2 G/DL — LOW (ref 3.3–5.2)
ALP SERPL-CCNC: 86 U/L — SIGNIFICANT CHANGE UP (ref 40–120)
ALP SERPL-CCNC: 86 U/L — SIGNIFICANT CHANGE UP (ref 40–120)
ALT FLD-CCNC: 17 U/L — SIGNIFICANT CHANGE UP
ALT FLD-CCNC: 21 U/L — SIGNIFICANT CHANGE UP
ANION GAP SERPL CALC-SCNC: 10 MMOL/L — SIGNIFICANT CHANGE UP (ref 5–17)
ANION GAP SERPL CALC-SCNC: 9 MMOL/L — SIGNIFICANT CHANGE UP (ref 5–17)
ANISOCYTOSIS BLD QL: SLIGHT — SIGNIFICANT CHANGE UP
AST SERPL-CCNC: 14 U/L — SIGNIFICANT CHANGE UP
AST SERPL-CCNC: 26 U/L — SIGNIFICANT CHANGE UP
BILIRUB DIRECT SERPL-MCNC: 0.2 MG/DL — SIGNIFICANT CHANGE UP (ref 0–0.3)
BILIRUB INDIRECT FLD-MCNC: 0.3 MG/DL — SIGNIFICANT CHANGE UP (ref 0.2–1)
BILIRUB SERPL-MCNC: 0.5 MG/DL — SIGNIFICANT CHANGE UP (ref 0.4–2)
BILIRUB SERPL-MCNC: 1 MG/DL — SIGNIFICANT CHANGE UP (ref 0.4–2)
BUN SERPL-MCNC: 16 MG/DL — SIGNIFICANT CHANGE UP (ref 8–20)
BUN SERPL-MCNC: 21 MG/DL — HIGH (ref 8–20)
CALCIUM SERPL-MCNC: 8.3 MG/DL — LOW (ref 8.6–10.2)
CALCIUM SERPL-MCNC: 8.5 MG/DL — LOW (ref 8.6–10.2)
CHLORIDE SERPL-SCNC: 100 MMOL/L — SIGNIFICANT CHANGE UP (ref 98–107)
CHLORIDE SERPL-SCNC: 102 MMOL/L — SIGNIFICANT CHANGE UP (ref 98–107)
CO2 SERPL-SCNC: 24 MMOL/L — SIGNIFICANT CHANGE UP (ref 22–29)
CO2 SERPL-SCNC: 26 MMOL/L — SIGNIFICANT CHANGE UP (ref 22–29)
CREAT SERPL-MCNC: 0.73 MG/DL — SIGNIFICANT CHANGE UP (ref 0.5–1.3)
CREAT SERPL-MCNC: 0.74 MG/DL — SIGNIFICANT CHANGE UP (ref 0.5–1.3)
ELLIPTOCYTES BLD QL SMEAR: SLIGHT — SIGNIFICANT CHANGE UP
EOSINOPHIL # BLD AUTO: 0.2 K/UL — SIGNIFICANT CHANGE UP (ref 0–0.5)
EOSINOPHIL NFR BLD AUTO: 2.3 % — SIGNIFICANT CHANGE UP (ref 0–5)
GLUCOSE SERPL-MCNC: 127 MG/DL — HIGH (ref 70–115)
GLUCOSE SERPL-MCNC: 93 MG/DL — SIGNIFICANT CHANGE UP (ref 70–115)
HCT VFR BLD CALC: 25.4 % — LOW (ref 42–52)
HCT VFR BLD CALC: 26.5 % — LOW (ref 42–52)
HCT VFR BLD CALC: 27.3 % — LOW (ref 42–52)
HCT VFR BLD CALC: 29 % — LOW (ref 42–52)
HGB BLD-MCNC: 8.5 G/DL — LOW (ref 14–18)
HGB BLD-MCNC: 8.8 G/DL — LOW (ref 14–18)
HGB BLD-MCNC: 9 G/DL — LOW (ref 14–18)
HGB BLD-MCNC: 9.8 G/DL — LOW (ref 14–18)
HYPOCHROMIA BLD QL: SLIGHT — SIGNIFICANT CHANGE UP
LYMPHOCYTES # BLD AUTO: 1.5 K/UL — SIGNIFICANT CHANGE UP (ref 1–4.8)
LYMPHOCYTES # BLD AUTO: 14.5 % — LOW (ref 20–55)
MACROCYTES BLD QL: SLIGHT — SIGNIFICANT CHANGE UP
MAGNESIUM SERPL-MCNC: 1.3 MG/DL — LOW (ref 1.6–2.6)
MAGNESIUM SERPL-MCNC: 1.7 MG/DL — SIGNIFICANT CHANGE UP (ref 1.6–2.6)
MCHC RBC-ENTMCNC: 29 PG — SIGNIFICANT CHANGE UP (ref 27–31)
MCHC RBC-ENTMCNC: 33 G/DL — SIGNIFICANT CHANGE UP (ref 32–36)
MCV RBC AUTO: 88.1 FL — SIGNIFICANT CHANGE UP (ref 80–94)
MICROCYTES BLD QL: SLIGHT — SIGNIFICANT CHANGE UP
MONOCYTES # BLD AUTO: 0.9 K/UL — HIGH (ref 0–0.8)
MONOCYTES NFR BLD AUTO: 8.8 % — SIGNIFICANT CHANGE UP (ref 3–10)
NEUTROPHILS # BLD AUTO: 7.4 K/UL — SIGNIFICANT CHANGE UP (ref 1.8–8)
NEUTROPHILS NFR BLD AUTO: 72.2 % — SIGNIFICANT CHANGE UP (ref 37–73)
OVALOCYTES BLD QL SMEAR: SLIGHT — SIGNIFICANT CHANGE UP
PHOSPHATE SERPL-MCNC: 2.6 MG/DL — SIGNIFICANT CHANGE UP (ref 2.4–4.7)
PHOSPHATE SERPL-MCNC: 2.9 MG/DL — SIGNIFICANT CHANGE UP (ref 2.4–4.7)
PLAT MORPH BLD: ABNORMAL
PLATELET # BLD AUTO: 121 K/UL — LOW (ref 150–400)
PLATELET CLUMP BLD QL SMEAR: SIGNIFICANT CHANGE UP
POIKILOCYTOSIS BLD QL AUTO: SLIGHT — SIGNIFICANT CHANGE UP
POTASSIUM SERPL-MCNC: 4.3 MMOL/L — SIGNIFICANT CHANGE UP (ref 3.5–5.3)
POTASSIUM SERPL-MCNC: 4.6 MMOL/L — SIGNIFICANT CHANGE UP (ref 3.5–5.3)
POTASSIUM SERPL-SCNC: 4.3 MMOL/L — SIGNIFICANT CHANGE UP (ref 3.5–5.3)
POTASSIUM SERPL-SCNC: 4.6 MMOL/L — SIGNIFICANT CHANGE UP (ref 3.5–5.3)
PROT SERPL-MCNC: 5.6 G/DL — LOW (ref 6.6–8.7)
PROT SERPL-MCNC: 5.8 G/DL — LOW (ref 6.6–8.7)
RBC # BLD: 3.1 M/UL — LOW (ref 4.6–6.2)
RBC # FLD: 17.4 % — HIGH (ref 11–15.6)
RBC BLD AUTO: ABNORMAL
SODIUM SERPL-SCNC: 135 MMOL/L — SIGNIFICANT CHANGE UP (ref 135–145)
SODIUM SERPL-SCNC: 136 MMOL/L — SIGNIFICANT CHANGE UP (ref 135–145)
WBC # BLD: 10.1 K/UL — SIGNIFICANT CHANGE UP (ref 4.8–10.8)
WBC # FLD AUTO: 10.1 K/UL — SIGNIFICANT CHANGE UP (ref 4.8–10.8)

## 2019-05-27 PROCEDURE — 99223 1ST HOSP IP/OBS HIGH 75: CPT

## 2019-05-27 PROCEDURE — 99233 SBSQ HOSP IP/OBS HIGH 50: CPT | Mod: GC

## 2019-05-27 RX ORDER — SODIUM CHLORIDE 9 MG/ML
500 INJECTION INTRAMUSCULAR; INTRAVENOUS; SUBCUTANEOUS ONCE
Refills: 0 | Status: COMPLETED | OUTPATIENT
Start: 2019-05-27 | End: 2019-05-27

## 2019-05-27 RX ORDER — PANTOPRAZOLE SODIUM 20 MG/1
40 TABLET, DELAYED RELEASE ORAL
Refills: 0 | Status: DISCONTINUED | OUTPATIENT
Start: 2019-05-27 | End: 2019-05-27

## 2019-05-27 RX ORDER — METRONIDAZOLE 500 MG
TABLET ORAL
Refills: 0 | Status: DISCONTINUED | OUTPATIENT
Start: 2019-05-27 | End: 2019-06-01

## 2019-05-27 RX ORDER — SACCHAROMYCES BOULARDII 250 MG
250 POWDER IN PACKET (EA) ORAL
Refills: 0 | Status: DISCONTINUED | OUTPATIENT
Start: 2019-05-27 | End: 2019-06-01

## 2019-05-27 RX ORDER — CIPROFLOXACIN LACTATE 400MG/40ML
VIAL (ML) INTRAVENOUS
Refills: 0 | Status: DISCONTINUED | OUTPATIENT
Start: 2019-05-27 | End: 2019-06-01

## 2019-05-27 RX ORDER — MAGNESIUM SULFATE 500 MG/ML
2 VIAL (ML) INJECTION ONCE
Refills: 0 | Status: COMPLETED | OUTPATIENT
Start: 2019-05-27 | End: 2019-05-27

## 2019-05-27 RX ORDER — PANTOPRAZOLE SODIUM 20 MG/1
40 TABLET, DELAYED RELEASE ORAL
Refills: 0 | Status: DISCONTINUED | OUTPATIENT
Start: 2019-05-27 | End: 2019-06-01

## 2019-05-27 RX ORDER — METRONIDAZOLE 500 MG
500 TABLET ORAL ONCE
Refills: 0 | Status: COMPLETED | OUTPATIENT
Start: 2019-05-27 | End: 2019-05-27

## 2019-05-27 RX ORDER — CIPROFLOXACIN LACTATE 400MG/40ML
400 VIAL (ML) INTRAVENOUS EVERY 12 HOURS
Refills: 0 | Status: DISCONTINUED | OUTPATIENT
Start: 2019-05-27 | End: 2019-06-01

## 2019-05-27 RX ORDER — METRONIDAZOLE 500 MG
500 TABLET ORAL EVERY 8 HOURS
Refills: 0 | Status: DISCONTINUED | OUTPATIENT
Start: 2019-05-27 | End: 2019-06-01

## 2019-05-27 RX ORDER — ACETAMINOPHEN 500 MG
650 TABLET ORAL EVERY 6 HOURS
Refills: 0 | Status: DISCONTINUED | OUTPATIENT
Start: 2019-05-27 | End: 2019-06-01

## 2019-05-27 RX ORDER — METOPROLOL TARTRATE 50 MG
100 TABLET ORAL DAILY
Refills: 0 | Status: DISCONTINUED | OUTPATIENT
Start: 2019-05-27 | End: 2019-06-01

## 2019-05-27 RX ORDER — CIPROFLOXACIN LACTATE 400MG/40ML
400 VIAL (ML) INTRAVENOUS ONCE
Refills: 0 | Status: COMPLETED | OUTPATIENT
Start: 2019-05-27 | End: 2019-05-27

## 2019-05-27 RX ORDER — METOPROLOL TARTRATE 50 MG
5 TABLET ORAL ONCE
Refills: 0 | Status: COMPLETED | OUTPATIENT
Start: 2019-05-27 | End: 2019-05-27

## 2019-05-27 RX ADMIN — Medication 50 GRAM(S): at 08:42

## 2019-05-27 RX ADMIN — Medication 3 MILLILITER(S): at 09:25

## 2019-05-27 RX ADMIN — Medication 100 MILLIGRAM(S): at 12:16

## 2019-05-27 RX ADMIN — Medication 250 MILLIGRAM(S): at 17:17

## 2019-05-27 RX ADMIN — PANTOPRAZOLE SODIUM 40 MILLIGRAM(S): 20 TABLET, DELAYED RELEASE ORAL at 05:13

## 2019-05-27 RX ADMIN — Medication 100 MILLIGRAM(S): at 05:11

## 2019-05-27 RX ADMIN — PANTOPRAZOLE SODIUM 40 MILLIGRAM(S): 20 TABLET, DELAYED RELEASE ORAL at 08:31

## 2019-05-27 RX ADMIN — Medication 100 MILLIGRAM(S): at 08:55

## 2019-05-27 RX ADMIN — Medication 200 MILLIGRAM(S): at 05:08

## 2019-05-27 RX ADMIN — Medication 5 MILLIGRAM(S): at 05:59

## 2019-05-27 RX ADMIN — Medication 100 MILLIGRAM(S): at 19:37

## 2019-05-27 RX ADMIN — Medication 650 MILLIGRAM(S): at 10:28

## 2019-05-27 RX ADMIN — Medication 200 MILLIGRAM(S): at 17:23

## 2019-05-27 RX ADMIN — SODIUM CHLORIDE 1000 MILLILITER(S): 9 INJECTION INTRAMUSCULAR; INTRAVENOUS; SUBCUTANEOUS at 04:21

## 2019-05-27 RX ADMIN — Medication 650 MILLIGRAM(S): at 10:14

## 2019-05-27 NOTE — PROGRESS NOTE ADULT - SUBJECTIVE AND OBJECTIVE BOX
CC: Patient is a 64y old  Male who presents with a chief complaint of GI bleed (26 May 2019 22:36)    Patient seen and examined at bedside, No acute overnight events. Pt reports no further bloody bowel movements, abd pain, N/V or fever.   Patient not ambulating, NPO, voiding and last BM prior to admission.    ROS: Denies fever, chest pain, SOB, abdominal pain, diarrhea, constipation, calf pain.    VS:   Vital Signs Last 24 Hrs  T(C): 36.9 (27 May 2019 01:25), Max: 37.1 (26 May 2019 21:55)  T(F): 98.5 (27 May 2019 01:25), Max: 98.8 (26 May 2019 21:55)  HR: 98 (27 May 2019 01:25) (87 - 148)  BP: 119/68 (27 May 2019 01:25) (61/29 - 149/64)  RR: 18 (27 May 2019 01:25) (18 - 24)  SpO2: 97% (27 May 2019 01:25) (94% - 100%)    Physical Exam:   Gen: NAD  HEENT: NCAT, EOMI, PERRLA, Pupils_  CVS: RRR, +S1/S2, no murmurs, rubs or gallops appreciated  Lungs: CTAB, no wheeze, rales, rhonchi  Abdomen: +BS, soft, ND, NT. no palpable flank tenderness or mass, no CVA tenderness  Ext: No cyanosis, edema or calf tenderness  Neuro: AAOx3, no focal deficits.    Labs:                        9.8    x     )-----------( x        ( 27 May 2019 02:16 )             29.0   05-26    135  |  101  |  28.0<H>  ----------------------------<  171<H>  5.2   |  23.0  |  1.17    Ca    8.6      26 May 2019 19:26    TPro  5.9<L>  /  Alb  3.3  /  TBili  0.6  /  DBili  x   /  AST  23  /  ALT  26  /  AlkPhos  105  05-26        Radiology:  < from: CT Angio Abdomen and Pelvis w/ IV Cont (05.26.19 @ 18:10) >  IMPRESSION:    No active GI bleeding.    Right-sided colitis/cecitis. Follow-up colonoscopy after resolution of   symptoms suggested.    < end of copied text >        Medications:  MEDICATIONS  (STANDING):  ALBUTerol/ipratropium for Nebulization 3 milliLiter(s) Nebulizer every 24 hours  ciprofloxacin   IVPB      ciprofloxacin   IVPB 400 milliGRAM(s) IV Intermittent every 12 hours  metroNIDAZOLE  IVPB 500 milliGRAM(s) IV Intermittent every 8 hours  metroNIDAZOLE  IVPB      pantoprazole  Injectable 40 milliGRAM(s) IV Push every 12 hours    MEDICATIONS  (PRN): CC: Patient is a 64y old  Male who presents with a chief complaint of GI bleed     Patient seen and examined at bedside, No acute overnight events. Pt reports no further bloody bowel movements, abd pain, N/V or fever.   Patient not ambulating, NPO, voiding and last BM prior to admission.    ROS: Denies fever, chest pain, SOB, abdominal pain, diarrhea, constipation, calf pain.    VS:   Vital Signs Last 24 Hrs  T(C): 36.9 (27 May 2019 01:25), Max: 37.1 (26 May 2019 21:55)  T(F): 98.5 (27 May 2019 01:25), Max: 98.8 (26 May 2019 21:55)  HR: 98 (27 May 2019 01:25) (87 - 148)  BP: 119/68 (27 May 2019 01:25) (61/29 - 149/64)  RR: 18 (27 May 2019 01:25) (18 - 24)  SpO2: 97% (27 May 2019 01:25) (94% - 100%)    Physical Exam:   Gen: NAD  HEENT: NCAT, EOMI, PERRLA, Pupils_  CVS: RRR, +S1/S2, no murmurs, rubs or gallops appreciated  Lungs: CTAB, no wheeze, rales, rhonchi  Abdomen: +BS, soft, ND, NT. no palpable flank tenderness or mass, no CVA tenderness  Ext: No cyanosis, edema or calf tenderness  Neuro: AAOx3, no focal deficits.    Labs:                        9.8    x     )-----------( x        ( 27 May 2019 02:16 )             29.0   05-26    135  |  101  |  28.0<H>  ----------------------------<  171<H>  5.2   |  23.0  |  1.17    Ca    8.6      26 May 2019 19:26    TPro  5.9<L>  /  Alb  3.3  /  TBili  0.6  /  DBili  x   /  AST  23  /  ALT  26  /  AlkPhos  105  05-26        Radiology:  < from: CT Angio Abdomen and Pelvis w/ IV Cont (05.26.19 @ 18:10) >  IMPRESSION:    No active GI bleeding.    Right-sided colitis/cecitis. Follow-up colonoscopy after resolution of   symptoms suggested.    < end of copied text >        Medications:  MEDICATIONS  (STANDING):  ALBUTerol/ipratropium for Nebulization 3 milliLiter(s) Nebulizer every 24 hours  ciprofloxacin   IVPB      ciprofloxacin   IVPB 400 milliGRAM(s) IV Intermittent every 12 hours  metroNIDAZOLE  IVPB 500 milliGRAM(s) IV Intermittent every 8 hours  metroNIDAZOLE  IVPB      pantoprazole  Injectable 40 milliGRAM(s) IV Push every 12 hours    MEDICATIONS  (PRN):

## 2019-05-27 NOTE — CONSULT NOTE ADULT - PROBLEM SELECTOR RECOMMENDATION 9
Secondary to cecitis seen on CT, ? etiology. Agree with IV Cipro and Flagyl. He has had no further hematochezia since yesterday AM. OK to feed pt. and observe for re-bleeding with re-feeding. Eventual [facility] colonoscopy in 4 to 6 weeks.
likely lower GI bleed w/ BRBPR  Protonix BID   s/p 4 PRBC, repeat CBC q6hr. Transfuse PRN for Hg <7   s/p 2FFP   GI consult

## 2019-05-27 NOTE — PROGRESS NOTE ADULT - ASSESSMENT
65 yo male with pmhx of Afib on CardioXT s/p watchman on 4/2/19, CHF, and COPD presents with bright red blood per rectum admitted for likely lower GI bleed.     Bright red blood per rectum likely 2/2 Lower GI bleed  -s/p PRBC 4 units, plasma 1 unit, platelets 1 unit in ED  -H/H responded from 8.2 to 10.9  -currently asymptomatic without further episodes since this morning  -CT abd shows right-sided colitis/cecitis  -serial H/H q6h for now  -start protonix 40 mg iv bid  -GI consult pending    Colitis   -CT abd shows right sided colitis/cecitis and pt has downtrending wbc ct.   -start clear liquids, npo after midnight for possible procedure  -start ciprofloxacin 400 mg iv bid  -start metronidazole 500 mg iv q8h    Atrial fibrillation  -will hold Cardia XT given soft bp  -continue to monitor    Chronic CHF  -will hold lasix 40 mg po qdaily hold med given soft bp  -strict I/Os, daily weights    COPD  -not on home O2  -c/w home med, Albuterol neb q24h    Leukocytosis  -likely reactive, downtrending  -will repeat in am    Elevated BUN  -likely due to hypovolemia  -will repeat in AM    Preventive measure  -dvt ppx: holding AC due to bleed risk, vcd boots 65 yo male with pmhx of Afib on CardioXT s/p watchman on 4/2/19, CHF, and COPD presents with bright red blood per rectum admitted for likely lower GI bleed.     Bright red blood per rectum likely 2/2 Lower GI bleed  -s/p PRBC 4 units, plasma 1 unit, platelets 1 unit in ED  -H/H responded from 8.2 to 10.9  -currently asymptomatic without further episodes since this morning  -CT abd shows right-sided colitis/cecitis  -serial H/H q6h for now  -start protonix 40 mg q daily  -GI consult consulted and appreciated. OK to re feed and observe for rebleed and f/u outpatient for colonoscopy 4-6 weeks after. D/C    Right sided Colitis   -CT abd shows right sided colitis/cecitis and pt has downtrending wbc ct.   -start clear liquids, npo after midnight for possible procedure  -start ciprofloxacin 400 mg iv bid  -start metronidazole 500 mg iv q8h    Atrial fibrillation  -will hold Cardia XT given soft bp  -continue to monitor    Chronic CHF  -will hold lasix 40 mg po qdaily hold med given soft bp  -strict I/Os, daily weights    COPD  -not on home O2  -c/w home med, Albuterol neb q24h    Leukocytosis  -likely reactive, downtrending  -will repeat in am    Elevated BUN  -likely due to hypovolemia  -will repeat in AM    Preventive measure  -dvt ppx: holding AC due to bleed risk, vcd boots 65 yo male with pmhx of Afib on CardioXT s/p watchman on 4/2/19, CHF, and COPD presents with bright red blood per rectum admitted for likely lower GI bleed.     Bright red blood per rectum likely 2/2 Lower GI bleed  -s/p PRBC 4 units, plasma 1 unit, platelets 1 unit in ED  -H/H responded from 8.2 to 10.9  -currently asymptomatic without further episodes since this morning  -CT abd shows right-sided colitis/cecitis  -serial H/H q6h for now  -start protonix 40 mg q daily  -GI consult consulted and appreciated. OK to re feed and observe for rebleed and f/u outpatient for colonoscopy 4-6 weeks after. D/C    Right sided Colitis   -CT abd shows right sided colitis/cecitis and pt has downtrending wbc ct.   -start clear liquids, npo after midnight for possible procedure  -start ciprofloxacin 400 mg iv bid  -start metronidazole 500 mg iv q8h    Atrial fibrillation/ chronic   -will hold Cardia XT given soft bp  -continue to monitor    Chronic CHF/ stable   -will hold lasix 40 mg po qdaily hold med given soft bp  -strict I/Os, daily weights    COPD/ stable   -not on home O2  -c/w home med, Albuterol neb q24h    Elevated BUN  -likely due to hypovolemia  -will repeat in AM    Preventive measure  -dvt ppx: holding AC due to bleed risk, vcd boots

## 2019-05-27 NOTE — CONSULT NOTE ADULT - SUBJECTIVE AND OBJECTIVE BOX
Patient is a 64y old  Male who presents with a chief complaint of rectal bleeding      HPI:  65 yo male with pmhx of Afib on CardioXT s/p watchman on 4/2/19, CHF on lasix, and COPD (not on home O2) presents to ED biba due to bright red blood per rectum. Pt reports that at 5 am today he had a BM that was largely bright red blood. He says that toilet was filled with blood. He had 2 further episodes consecutively. He felt lightheaded and weak. He also felt SOB that was not relieved by rest. He could not walk and needed to sit down after which he called EMS. He has had no previous episodes. He denies mucous or stool in toilet. He received 4 units PRBC in ED and says he felt significantly better. He denies fever, chills, CP, palpitations, abd pain, N/V, or calf pain. He has had no prior colonoscopies and his last bloody BM was yesterday AM. He is s/p Watchman procedure for atrial fibrillation 04/02 and has been off of Xarelto for roughly 3 weeks. He has had no prior episodes of rectal bleeding and denies any associated abdominal or rectal pain with the above hematochezia. CT Angio of abdomen and pelvis showed cecitis w/o active bleeding.      REVIEW OF SYSTEMS:  Constitutional: No fever, weight loss or fatigue  ENMT:  No difficulty hearing, tinnitus, vertigo; No sinus or throat pain  Respiratory: No cough, wheezing, chills or hemoptysis  Cardiovascular: No chest pain, palpitations, dizziness or leg swelling  Gastrointestinal: As per HPI. No abdominal or epigastric pain. No nausea, vomiting or hematemesis; No diarrhea or constipation. No melena.  Skin: No itching, burning, rashes or lesions   Musculoskeletal: No joint pain or swelling; No muscle, back or extremity pain  Patient has no cardiopulmonary, peripheral vascular, musculoskeletal, dermatological, neurological, or psychological symptoms or complaints at this time.    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    MEDICATIONS:  MEDICATIONS  (STANDING):  ALBUTerol/ipratropium for Nebulization 3 milliLiter(s) Nebulizer every 24 hours  ciprofloxacin   IVPB      ciprofloxacin   IVPB 400 milliGRAM(s) IV Intermittent every 12 hours  metroNIDAZOLE  IVPB 500 milliGRAM(s) IV Intermittent every 8 hours  metroNIDAZOLE  IVPB      pantoprazole  Injectable 40 milliGRAM(s) IV Push every 12 hours    MEDICATIONS  (PRN):      Allergies    Duricef (Rash)    Intolerances        Vital Signs Last 24 Hrs  T(C): 37.1 (27 May 2019 05:17), Max: 37.1 (26 May 2019 21:55)  T(F): 98.7 (27 May 2019 05:17), Max: 98.8 (26 May 2019 21:55)  HR: 132 (27 May 2019 05:44) (87 - 148)  BP: 110/65 (27 May 2019 05:44) (61/29 - 149/64)  BP(mean): --  RR: 18 (27 May 2019 05:17) (18 - 24)  SpO2: 99% (27 May 2019 05:17) (94% - 100%)        PHYSICAL EXAM:    General: Well developed; overweight in no acute distress  HEENT: MMM, conjunctiva pink and sclera anicteric.  Lungs: Clear bilaterally  Cor: RRR S1, S2 only  Gastrointestinal: Soft, obese, mild right sided abdominal tenderness, non-distended; Normal bowel sounds; No rebound or guarding or palpable HSM  TRACEY: Brown stool w/o blood or melena. No tenderness, + prostatic enlargement  Extremities: Normal range of motion, No clubbing, cyanosis or edema  Neurological: Alert and oriented x3  Skin: Warm and dry. No obvious rash      LABS:                        9.8    x     )-----------( x        ( 27 May 2019 02:16 )             29.0     05-26    135  |  101  |  28.0<H>  ----------------------------<  171<H>  5.2   |  23.0  |  1.17    Ca    8.6      26 May 2019 19:26    TPro  5.9<L>  /  Alb  3.3  /  TBili  0.6  /  DBili  x   /  AST  23  /  ALT  26  /  AlkPhos  105  05-26          RADIOLOGY & ADDITIONAL STUDIES:   CT angio results noted. + cecitis

## 2019-05-27 NOTE — CONSULT NOTE ADULT - PROBLEM SELECTOR RECOMMENDATION 2
See above. Eventual [facility] colonoscopy in 4 to 6 weeks.
Plan as above  transfuse for Hg <7 or if patient becomes HD unstable w/ active bleeding

## 2019-05-28 ENCOUNTER — TRANSCRIPTION ENCOUNTER (OUTPATIENT)
Age: 64
End: 2019-05-28

## 2019-05-28 LAB
ANION GAP SERPL CALC-SCNC: 10 MMOL/L — SIGNIFICANT CHANGE UP (ref 5–17)
BASOPHILS # BLD AUTO: 0 K/UL — SIGNIFICANT CHANGE UP (ref 0–0.2)
BASOPHILS NFR BLD AUTO: 0.2 % — SIGNIFICANT CHANGE UP (ref 0–2)
BUN SERPL-MCNC: 12 MG/DL — SIGNIFICANT CHANGE UP (ref 8–20)
CALCIUM SERPL-MCNC: 8.3 MG/DL — LOW (ref 8.6–10.2)
CHLORIDE SERPL-SCNC: 101 MMOL/L — SIGNIFICANT CHANGE UP (ref 98–107)
CO2 SERPL-SCNC: 24 MMOL/L — SIGNIFICANT CHANGE UP (ref 22–29)
CREAT SERPL-MCNC: 0.71 MG/DL — SIGNIFICANT CHANGE UP (ref 0.5–1.3)
EOSINOPHIL # BLD AUTO: 0.2 K/UL — SIGNIFICANT CHANGE UP (ref 0–0.5)
EOSINOPHIL NFR BLD AUTO: 3.2 % — SIGNIFICANT CHANGE UP (ref 0–5)
GLUCOSE SERPL-MCNC: 128 MG/DL — HIGH (ref 70–115)
HCT VFR BLD CALC: 22.4 % — LOW (ref 42–52)
HCT VFR BLD CALC: 23.4 % — LOW (ref 42–52)
HCV AB S/CO SERPL IA: 0.09 S/CO — SIGNIFICANT CHANGE UP (ref 0–0.99)
HCV AB SERPL-IMP: SIGNIFICANT CHANGE UP
HGB BLD-MCNC: 7.4 G/DL — LOW (ref 14–18)
HGB BLD-MCNC: 7.8 G/DL — LOW (ref 14–18)
LYMPHOCYTES # BLD AUTO: 0.9 K/UL — LOW (ref 1–4.8)
LYMPHOCYTES # BLD AUTO: 15.9 % — LOW (ref 20–55)
MAGNESIUM SERPL-MCNC: 1.3 MG/DL — LOW (ref 1.6–2.6)
MCHC RBC-ENTMCNC: 29.7 PG — SIGNIFICANT CHANGE UP (ref 27–31)
MCHC RBC-ENTMCNC: 33 G/DL — SIGNIFICANT CHANGE UP (ref 32–36)
MCV RBC AUTO: 90 FL — SIGNIFICANT CHANGE UP (ref 80–94)
MONOCYTES # BLD AUTO: 0.5 K/UL — SIGNIFICANT CHANGE UP (ref 0–0.8)
MONOCYTES NFR BLD AUTO: 8.5 % — SIGNIFICANT CHANGE UP (ref 3–10)
NEUTROPHILS # BLD AUTO: 4.2 K/UL — SIGNIFICANT CHANGE UP (ref 1.8–8)
NEUTROPHILS NFR BLD AUTO: 71.9 % — SIGNIFICANT CHANGE UP (ref 37–73)
PHOSPHATE SERPL-MCNC: 2.8 MG/DL — SIGNIFICANT CHANGE UP (ref 2.4–4.7)
PLATELET # BLD AUTO: 149 K/UL — LOW (ref 150–400)
POTASSIUM SERPL-MCNC: 4.3 MMOL/L — SIGNIFICANT CHANGE UP (ref 3.5–5.3)
POTASSIUM SERPL-SCNC: 4.3 MMOL/L — SIGNIFICANT CHANGE UP (ref 3.5–5.3)
RBC # BLD: 2.49 M/UL — LOW (ref 4.6–6.2)
RBC # FLD: 17.1 % — HIGH (ref 11–15.6)
SODIUM SERPL-SCNC: 135 MMOL/L — SIGNIFICANT CHANGE UP (ref 135–145)
WBC # BLD: 5.9 K/UL — SIGNIFICANT CHANGE UP (ref 4.8–10.8)
WBC # FLD AUTO: 5.9 K/UL — SIGNIFICANT CHANGE UP (ref 4.8–10.8)

## 2019-05-28 PROCEDURE — 99233 SBSQ HOSP IP/OBS HIGH 50: CPT | Mod: GC

## 2019-05-28 PROCEDURE — 99233 SBSQ HOSP IP/OBS HIGH 50: CPT

## 2019-05-28 PROCEDURE — 99223 1ST HOSP IP/OBS HIGH 75: CPT

## 2019-05-28 RX ORDER — MAGNESIUM OXIDE 400 MG ORAL TABLET 241.3 MG
400 TABLET ORAL ONCE
Refills: 0 | Status: COMPLETED | OUTPATIENT
Start: 2019-05-28 | End: 2019-05-28

## 2019-05-28 RX ORDER — MORPHINE SULFATE 50 MG/1
2 CAPSULE, EXTENDED RELEASE ORAL EVERY 6 HOURS
Refills: 0 | Status: DISCONTINUED | OUTPATIENT
Start: 2019-05-28 | End: 2019-06-01

## 2019-05-28 RX ORDER — MORPHINE SULFATE 50 MG/1
1 CAPSULE, EXTENDED RELEASE ORAL EVERY 4 HOURS
Refills: 0 | Status: DISCONTINUED | OUTPATIENT
Start: 2019-05-28 | End: 2019-06-01

## 2019-05-28 RX ORDER — MAGNESIUM SULFATE 500 MG/ML
2 VIAL (ML) INJECTION ONCE
Refills: 0 | Status: COMPLETED | OUTPATIENT
Start: 2019-05-28 | End: 2019-05-28

## 2019-05-28 RX ORDER — LIDOCAINE 4 G/100G
1 CREAM TOPICAL DAILY
Refills: 0 | Status: DISCONTINUED | OUTPATIENT
Start: 2019-05-28 | End: 2019-06-01

## 2019-05-28 RX ORDER — SOD SULF/SODIUM/NAHCO3/KCL/PEG
4000 SOLUTION, RECONSTITUTED, ORAL ORAL ONCE
Refills: 0 | Status: COMPLETED | OUTPATIENT
Start: 2019-05-28 | End: 2019-05-28

## 2019-05-28 RX ORDER — FUROSEMIDE 40 MG
40 TABLET ORAL DAILY
Refills: 0 | Status: DISCONTINUED | OUTPATIENT
Start: 2019-05-28 | End: 2019-06-01

## 2019-05-28 RX ADMIN — MORPHINE SULFATE 2 MILLIGRAM(S): 50 CAPSULE, EXTENDED RELEASE ORAL at 14:36

## 2019-05-28 RX ADMIN — Medication 200 MILLIGRAM(S): at 06:06

## 2019-05-28 RX ADMIN — MAGNESIUM OXIDE 400 MG ORAL TABLET 400 MILLIGRAM(S): 241.3 TABLET ORAL at 13:59

## 2019-05-28 RX ADMIN — Medication 50 GRAM(S): at 19:26

## 2019-05-28 RX ADMIN — Medication 4000 MILLILITER(S): at 22:39

## 2019-05-28 RX ADMIN — Medication 250 MILLIGRAM(S): at 04:48

## 2019-05-28 RX ADMIN — Medication 250 MILLIGRAM(S): at 19:28

## 2019-05-28 RX ADMIN — LIDOCAINE 1 PATCH: 4 CREAM TOPICAL at 13:54

## 2019-05-28 RX ADMIN — Medication 200 MILLIGRAM(S): at 21:11

## 2019-05-28 RX ADMIN — MORPHINE SULFATE 2 MILLIGRAM(S): 50 CAPSULE, EXTENDED RELEASE ORAL at 21:10

## 2019-05-28 RX ADMIN — LIDOCAINE 1 PATCH: 4 CREAM TOPICAL at 19:33

## 2019-05-28 RX ADMIN — Medication 100 MILLIGRAM(S): at 04:55

## 2019-05-28 RX ADMIN — Medication 100 MILLIGRAM(S): at 21:11

## 2019-05-28 RX ADMIN — Medication 100 MILLIGRAM(S): at 04:48

## 2019-05-28 RX ADMIN — Medication 50 GRAM(S): at 19:27

## 2019-05-28 RX ADMIN — PANTOPRAZOLE SODIUM 40 MILLIGRAM(S): 20 TABLET, DELAYED RELEASE ORAL at 19:28

## 2019-05-28 RX ADMIN — MORPHINE SULFATE 2 MILLIGRAM(S): 50 CAPSULE, EXTENDED RELEASE ORAL at 14:25

## 2019-05-28 RX ADMIN — PANTOPRAZOLE SODIUM 40 MILLIGRAM(S): 20 TABLET, DELAYED RELEASE ORAL at 04:48

## 2019-05-28 NOTE — PROGRESS NOTE ADULT - ASSESSMENT
Persistent hematochezia; multiply transfused.  No diverticulitis.  Offered colonoscopy; procedure/ R/B/Prep reviewed with pt who understands and agrees to proceed. ASA #2.  EKG still shows A Fib post Watchman procedure.  Cardiology clearance for procedure requested.    Arrangements made.  Orders for prep written.

## 2019-05-28 NOTE — CONSULT NOTE ADULT - SUBJECTIVE AND OBJECTIVE BOX
Patient is a 64y old  Male who presents with a chief complaint of GI bleed; hematochezia. (28 May 2019 13:41)      HPI: 63 y/o M with a PMHx of HFpEF (EF on MYA 05/20/2019 EF 60-65%), pulmonary HTN on lasix, COPD (not on home O2), Afib (s/p Watchman 04/02/19, off Xarelto), BPH, HTN, HLD, GERD, and EtOH abuse who presented to the ED with bright red blood per rectum. Patient states he was in his usual state of health until May 26th when he suddenly had multiple large bloody BM's. Patient states that he immediately felt lightheaded and short of breath, and was unable to ambulate due to severe weakness so he called EMS. Patient was found to have right sided colitis currently on cipro and flagyl, and anemia secondary to BRBPR s/p multiple PRBC transfusions. Patient is now being evaluated for colonoscopy, GI is requesting cardiac clearance. Patient states that prior to this event he was feeling at his baseline. Patient states he always has a degree of orthopnea, but no worsening of symptoms. Patient notes some right sided rib pain since admission, but otherwise is feeling a little better after blood transfusions. Pt denies any fevers, chills, CP, PND, LE swelling, N/V, headache, vision changes, or dysuria.     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 Left ventricular ejection   fraction, by visual estimation, is 60 to 65%.  Right Ventricle: The right ventricular size is normal. RV systolic   function is low normal.  Left Atrium: Severely enlarged left atrium. There is no spontaneous echo   contrast or thrombus seen in the left atrium. A Watchman device is seen   well-positioned, stable, within the left atrial appendage, without   evidence for rocking motion. There is trace jose-device leak seen   posteriorly. The leak is too small to measure a vena contracta. All four   pulmonary veins are seen draining into the left atrium with   non-obstructed flow pattern.  Right Atrium: There is no spontaneous echo contrast or thrombus in the   right atrium. Color Doppler interrogation of the interatrial septum   demonstrates an interatrial shunt, from prior septal puncture.  Pericardium: Trivial pericardial effusion is present. There is a   significant pericardial fat pad present.  Mitral Valve: Structurally normal mitral valve, with normal leaflet   excursion. No evidence of mitral stenosis. Moderate mitral valve   regurgitation is seen.  Tricuspid Valve: The tricuspid valve is normal in structure. Moderate   tricuspid regurgitation is visualized. Peak TR gradient = 54 mmHg. There   is moderate to severe pulmonary hypertension.  Aortic Valve: Normal trileaflet aortic valve with normal opening. No   evidence of aortic stenosis. Trivial aortic valve regurgitation is seen.  Pulmonic Valve: The pulmonic valve is normal. Trace pulmonic valve   regurgitation.  Aorta: The visualized portions of the aortic root, ascending aorta,   aortic arch, and descending thoracic aorta are normal in size. There is   mild, non-mobile atheroma in the thoracic aorta.  In comparison to the previous echocardiogram(s): Prior examinations are   available and were reviewed for comparison purposes.       Summary:   1. Normal biventricular systolic function. Left ventricular ejection   fraction, by visual estimation, is 60 to 65%.   2. Moderate tricuspid regurgitation.   3. Moderate to severe pulmonary hypertension.   4. Moderate mitral regurgitation   5. Interatrial shunt   6. Stable Watchman device with trace jose-device leak.   7. No intracardiac thrombus   8. Trace pericardial effusion.    Meera Meade DO Electronically signed on 5/20/2019 at 6:58:47 PM         STRESS  FINDINGS:  Stress with Dr. Brittani Williamson 12/12/2018  Nuclear Pharmacologic Stress test: No evidence of infarct or inducible ischemia      Allergies    Duricef (Rash)    Intolerances    MEDICATIONS  (STANDING):  ciprofloxacin   IVPB      ciprofloxacin   IVPB 400 milliGRAM(s) IV Intermittent every 12 hours  lidocaine   Patch 1 Patch Transdermal daily  metoprolol succinate  milliGRAM(s) Oral daily  metroNIDAZOLE  IVPB 500 milliGRAM(s) IV Intermittent every 8 hours  metroNIDAZOLE  IVPB      pantoprazole  Injectable 40 milliGRAM(s) IV Push two times a day  polyethylene glycol/electrolyte Solution. 4000 milliLiter(s) Oral once  saccharomyces boulardii 250 milliGRAM(s) Oral two times a day    MEDICATIONS  (PRN):  acetaminophen   Tablet .. 650 milliGRAM(s) Oral every 6 hours PRN Temp greater or equal to 38C (100.4F), Mild Pain (1 - 3)  LORazepam   Injectable 2 milliGRAM(s) IV Push every 1 hour PRN CIWA-Ar score 8 or greater  morphine  - Injectable 1 milliGRAM(s) IV Push every 4 hours PRN Moderate Pain (4 - 6)  morphine  - Injectable 2 milliGRAM(s) IV Push every 6 hours PRN Severe Pain (7 - 10)    Home Medications:  albuterol 2.5 mg/3 mL (0.083%) inhalation solution: 3 milliliter(s) inhaled every 6 hours, As Needed -for shortness of breath and/or wheezing (26 May 2019 22:19)  aspirin 81 mg oral tablet: 1 tab(s) orally once a day (26 May 2019 22:19)  Cartia  mg/24 hours oral capsule, extended release: 1 cap(s) orally once a day (26 May 2019 22:19)  folic acid 1 mg oral tablet: 1 tab(s) orally once a day (26 May 2019 22:19)  furosemide 40 mg oral tablet: 1 tab(s) orally once a day (26 May 2019 22:19)  gabapentin 300 mg oral capsule: 1 cap(s) orally 3 times a day (26 May 2019 22:19)  lisinopril 5 mg oral tablet: 1 tab(s) orally once a day (26 May 2019 22:19)  metoprolol succinate 100 mg oral tablet, extended release: 1 tab(s) orally once a day (26 May 2019 22:19)  Multiple Vitamins oral tablet: 1 tab(s) orally once a day (26 May 2019 22:19)  pantoprazole 40 mg oral delayed release tablet: 1 tab(s) orally once a day (26 May 2019 22:19)  Spiriva 18 mcg inhalation capsule: 1 cap(s) inhaled once a day (26 May 2019 22:19)  tamsulosin 0.4 mg oral capsule: 1 cap(s) orally once a day (26 May 2019 22:19)  thiamine 100 mg oral tablet: 1 tab(s) orally once a day (26 May 2019 22:19)  traZODone 50 mg oral tablet: 1 tab(s) orally once a day (at bedtime), As Needed for insomnia (26 May 2019 22:19)    FAMILY HISTORY:  No pertinent family history in first degree relatives      SOCIAL HISTORY: Lives home alone    CIGARETTES: Former smoker, 1 ppd x 30 years    ALCOHOL: Heavy, daily EtOH use    REVIEW OF SYSTEMS:  CONSTITUTIONAL: No fever, weight loss, or fatigue  Cardiovascular:  AS PER HPI  Respiratory:   AS PER HPI  Genitourinary:  No dysuria, no hematuria;   Gastrointestinal:  AS PER HPI  Neurological: No headache, no dizziness, no slurred speech;    Psychiatric: No agitation, no anxiety.    ALL OTHER REVIEW OF SYSTEMS ARE NEGATIVE.    Vital Signs Last 24 Hrs  T(C): 37.2 (28 May 2019 07:45), Max: 37.3 (27 May 2019 23:17)  T(F): 98.9 (28 May 2019 07:45), Max: 99.2 (27 May 2019 23:17)  HR: 85 (28 May 2019 07:45) (73 - 109)  BP: 87/57 (28 May 2019 07:45) (86/73 - 133/80)  BP(mean): 79 (27 May 2019 19:55) (74 - 83)  RR: 19 (28 May 2019 07:45) (18 - 28)  SpO2: 98% (28 May 2019 07:45) (97% - 99%)      PHYSICAL EXAM:  Appearance: Normal, well nourished	  HEENT:   Normal oral mucosa, PERRL, EOMI, sclera non-icteric	  Lymphatic: No cervical lymphadenopathy  Cardiovascular:  Irregularly irregular, No JVD, II/VI systolic murmur at lsb, No carotid bruits, No peripheral edema  Respiratory: Trace rales at b/l bases  Psychiatry: A & O x 3, Mood & affect appropriate  Gastrointestinal:  Soft, Non-tender, + BS, no bruits	  Skin: No rashes, No ecchymoses, No cyanosis  Neurologic: Grossly non-focal with full strength in all four extremities  Extremities: Normal range of motion, No clubbing, cyanosis or edema  Vascular: Peripheral pulses palpable 2+ bilaterally      INTERPRETATION OF TELEMETRY: Afib with HR from 80-150s    ECG: Afib with RVR, qs wave V1-V2, PVCs, NSST/T wave abnormalities    LABS:                        7.4    5.9   )-----------( 149      ( 28 May 2019 06:57 )             22.4     05-28    135  |  101  |  12.0  ----------------------------<  128<H>  4.3   |  24.0  |  0.71    Ca    8.3<L>      28 May 2019 06:57  Phos  2.8     05-28  Mg     1.3     05-28    TPro  5.6<L>  /  Alb  3.2<L>  /  TBili  0.5  /  DBili  0.2  /  AST  14  /  ALT  17  /  AlkPhos  86  05-27      I&O's Summary    27 May 2019 07:01  -  28 May 2019 07:00  --------------------------------------------------------  IN: 400 mL / OUT: 1701 mL / NET: -1301 mL      BNP  Serum Pro-Brain Natriuretic Peptide: 660 pg/mL (05-26-19 @ 20:02)    RADIOLOGY & ADDITIONAL STUDIES:  < from: CT Angio Abdomen and Pelvis w/ IV Cont (05.26.19 @ 18:10) >   EXAM:  CT ANGIO ABD PELV (W)AW IC                          PROCEDURE DATE:  05/26/2019          INTERPRETATION:    CTA (CT ANGIOGRAPHY) of the abdomen and pelvis    INDICATION: Gastrointestinal bleeding. Evaluate for gastrointestinal   bleed.    TECHNIQUE: CTA (CT ANGIOGRAPHY) of the abdomen and pelvis was performed.   A preliminary scan was performed without intravenous contrast material.   Intravenous contrast was then administered and images were obtained   during the arterial phase and during a delayed phase. Image   postprocessing was performed including the production of axial, coronal,   and sagittal multiplanar reformatted images as well as sagittal and   coronal maximum intensity projections (MIPs).     CONTRAST: 91 cc of Omnipaque 350 was demonstrated intravenously. 0 cc   discarded.    COMPARISON: None.    FINDINGS:    Active GI bleeding: None    Others:   lower chest: Unremarkable.    Liver: Question cirrhosis. No focal mass.     Biliary system: Gallbladder is normal in size. No calcified gallstones.   No biliary ductal dilatation.    Pancreas: Unremarkable.    Spleen: Punctate granuloma.    Adrenal glands: Unremarkable.    Kidneys: Normal size.. No hydronephrosis. Nonobstructing intrarenal   calculi.     Urinary Bladder: Unremarkable.    Reproductive organs: Unremarkable.     Bowel/Peritoneum: Thickened cecum and distal ascending colon.. Normal   appendix. Remaining bowel unremarkable. No ascites or extraluminal gas.     Lymph nodes: No lymphadenopathy.    Aorta/IVC: Diffusely atherosclerotic. Mesenteric arteries patent with   single origin of the SMA and celiac.    Abdominal wall: No hernia.    Bones/Soft tissues: No acute abnormality.          IMPRESSION:    No active GI bleeding.    Right-sided colitis/cecitis. Follow-up colonoscopy after resolution of   symptoms suggested.    < end of copied text > Patient is a 64y old  Male who presents with a chief complaint of GI bleed; hematochezia. (28 May 2019 13:41)      HPI: 63 y/o M with a PMHx of HFpEF (EF on MYA 05/20/2019 EF 60-65%), pulmonary HTN on lasix, COPD (not on home O2), Afib (s/p Watchman 04/02/19, off Xarelto), BPH, HTN, HLD, GERD, and EtOH abuse who presented to the ED with bright red blood per rectum. Patient states he was in his usual state of health until May 26th when he suddenly had multiple large bloody BM's. Patient states that he immediately felt lightheaded and short of breath, and was unable to ambulate due to severe weakness so he called EMS. Patient was found to have right sided colitis currently on cipro and flagyl, and anemia secondary to BRBPR s/p multiple PRBC transfusions. Patient is now being evaluated for colonoscopy, GI is requesting cardiac clearance. Patient states that prior to this event he was feeling at his baseline. Patient states he always has a degree of orthopnea, but no worsening of symptoms. Patient notes some right sided rib pain since admission, but otherwise is feeling a little better after blood transfusions. Pt denies any fevers, chills, CP, PND, LE swelling, N/V, headache, vision changes, or dysuria.     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 Left ventricular ejection   fraction, by visual estimation, is 60 to 65%.  Right Ventricle: The right ventricular size is normal. RV systolic   function is low normal.  Left Atrium: Severely enlarged left atrium. There is no spontaneous echo   contrast or thrombus seen in the left atrium. A Watchman device is seen   well-positioned, stable, within the left atrial appendage, without   evidence for rocking motion. There is trace jose-device leak seen   posteriorly. The leak is too small to measure a vena contracta. All four   pulmonary veins are seen draining into the left atrium with   non-obstructed flow pattern.  Right Atrium: There is no spontaneous echo contrast or thrombus in the   right atrium. Color Doppler interrogation of the interatrial septum   demonstrates an interatrial shunt, from prior septal puncture.  Pericardium: Trivial pericardial effusion is present. There is a   significant pericardial fat pad present.  Mitral Valve: Structurally normal mitral valve, with normal leaflet   excursion. No evidence of mitral stenosis. Moderate mitral valve   regurgitation is seen.  Tricuspid Valve: The tricuspid valve is normal in structure. Moderate   tricuspid regurgitation is visualized. Peak TR gradient = 54 mmHg. There   is moderate to severe pulmonary hypertension.  Aortic Valve: Normal trileaflet aortic valve with normal opening. No   evidence of aortic stenosis. Trivial aortic valve regurgitation is seen.  Pulmonic Valve: The pulmonic valve is normal. Trace pulmonic valve   regurgitation.  Aorta: The visualized portions of the aortic root, ascending aorta,   aortic arch, and descending thoracic aorta are normal in size. There is   mild, non-mobile atheroma in the thoracic aorta.  In comparison to the previous echocardiogram(s): Prior examinations are   available and were reviewed for comparison purposes.       Summary:   1. Normal biventricular systolic function. Left ventricular ejection   fraction, by visual estimation, is 60 to 65%.   2. Moderate tricuspid regurgitation.   3. Moderate to severe pulmonary hypertension.   4. Moderate mitral regurgitation   5. Interatrial shunt   6. Stable Watchman device with trace jose-device leak.   7. No intracardiac thrombus   8. Trace pericardial effusion.    Meera Meade DO Electronically signed on 5/20/2019 at 6:58:47 PM     STRESS  FINDINGS:  Stress with Dr. Brittani Williamson 12/12/2018  Nuclear Pharmacologic Stress test: No evidence of infarct or inducible ischemia    Allergies  Duricef (Rash)    MEDICATIONS  (STANDING):  ciprofloxacin   IVPB      ciprofloxacin   IVPB 400 milliGRAM(s) IV Intermittent every 12 hours  lidocaine   Patch 1 Patch Transdermal daily  metoprolol succinate  milliGRAM(s) Oral daily  metroNIDAZOLE  IVPB 500 milliGRAM(s) IV Intermittent every 8 hours  metroNIDAZOLE  IVPB      pantoprazole  Injectable 40 milliGRAM(s) IV Push two times a day  polyethylene glycol/electrolyte Solution. 4000 milliLiter(s) Oral once  saccharomyces boulardii 250 milliGRAM(s) Oral two times a day    MEDICATIONS  (PRN):  acetaminophen   Tablet .. 650 milliGRAM(s) Oral every 6 hours PRN Temp greater or equal to 38C (100.4F), Mild Pain (1 - 3)  LORazepam   Injectable 2 milliGRAM(s) IV Push every 1 hour PRN CIWA-Ar score 8 or greater  morphine  - Injectable 1 milliGRAM(s) IV Push every 4 hours PRN Moderate Pain (4 - 6)  morphine  - Injectable 2 milliGRAM(s) IV Push every 6 hours PRN Severe Pain (7 - 10)    Home Medications:  albuterol 2.5 mg/3 mL (0.083%) inhalation solution: 3 milliliter(s) inhaled every 6 hours, As Needed -for shortness of breath and/or wheezing (26 May 2019 22:19)  aspirin 81 mg oral tablet: 1 tab(s) orally once a day (26 May 2019 22:19)  Cartia  mg/24 hours oral capsule, extended release: 1 cap(s) orally once a day (26 May 2019 22:19)  folic acid 1 mg oral tablet: 1 tab(s) orally once a day (26 May 2019 22:19)  furosemide 40 mg oral tablet: 1 tab(s) orally once a day (26 May 2019 22:19)  gabapentin 300 mg oral capsule: 1 cap(s) orally 3 times a day (26 May 2019 22:19)  lisinopril 5 mg oral tablet: 1 tab(s) orally once a day (26 May 2019 22:19)  metoprolol succinate 100 mg oral tablet, extended release: 1 tab(s) orally once a day (26 May 2019 22:19)  Multiple Vitamins oral tablet: 1 tab(s) orally once a day (26 May 2019 22:19)  pantoprazole 40 mg oral delayed release tablet: 1 tab(s) orally once a day (26 May 2019 22:19)  Spiriva 18 mcg inhalation capsule: 1 cap(s) inhaled once a day (26 May 2019 22:19)  tamsulosin 0.4 mg oral capsule: 1 cap(s) orally once a day (26 May 2019 22:19)  thiamine 100 mg oral tablet: 1 tab(s) orally once a day (26 May 2019 22:19)  traZODone 50 mg oral tablet: 1 tab(s) orally once a day (at bedtime), As Needed for insomnia (26 May 2019 22:19)    FAMILY HISTORY:  No pertinent family history in first degree relatives    SOCIAL HISTORY: Lives home alone  CIGARETTES: Former smoker, 1 ppd x 30 years  ALCOHOL: Heavy, daily EtOH use    REVIEW OF SYSTEMS:  CONSTITUTIONAL: No fever, weight loss, or fatigue  Cardiovascular:  AS PER HPI  Respiratory:   AS PER HPI  Genitourinary:  No dysuria, no hematuria;   Gastrointestinal:  AS PER HPI  Neurological: No headache, no dizziness, no slurred speech;    Psychiatric: No agitation, no anxiety.    ALL OTHER REVIEW OF SYSTEMS ARE NEGATIVE.    Vital Signs Last 24 Hrs  T(C): 37.2 (28 May 2019 07:45), Max: 37.3 (27 May 2019 23:17)  T(F): 98.9 (28 May 2019 07:45), Max: 99.2 (27 May 2019 23:17)  HR: 85 (28 May 2019 07:45) (73 - 109)  BP: 87/57 (28 May 2019 07:45) (86/73 - 133/80)  BP(mean): 79 (27 May 2019 19:55) (74 - 83)  RR: 19 (28 May 2019 07:45) (18 - 28)  SpO2: 98% (28 May 2019 07:45) (97% - 99%)    PHYSICAL EXAM:  Appearance: Normal, well nourished	  HEENT:   Normal oral mucosa, PERRL, EOMI, sclera non-icteric	  Lymphatic: No cervical lymphadenopathy  Cardiovascular:  Irregularly irregular, No JVD, II/VI systolic murmur at lsb, No carotid bruits, No peripheral edema  Respiratory: Trace rales at b/l bases  Psychiatry: A & O x 3, Mood & affect appropriate  Gastrointestinal:  Soft, Non-tender, + BS, no bruits	  Skin: No rashes, No ecchymoses, No cyanosis  Neurologic: Grossly non-focal with full strength in all four extremities  Extremities: Normal range of motion, No clubbing, cyanosis or edema  Vascular: Peripheral pulses palpable 2+ bilaterally    INTERPRETATION OF TELEMETRY: Afib with HR from 80-150s    ECG: Afib with RVR, qs wave V1-V2, PVCs, NSST/T wave abnormalities    LABS:                      7.4    5.9   )-----------( 149      ( 28 May 2019 06:57 )             22.4     05-28    135  |  101  |  12.0  ----------------------------<  128<H>  4.3   |  24.0  |  0.71    Ca    8.3<L>      28 May 2019 06:57  Phos  2.8     05-28  Mg     1.3     05-28    TPro  5.6<L>  /  Alb  3.2<L>  /  TBili  0.5  /  DBili  0.2  /  AST  14  /  ALT  17  /  AlkPhos  86  05-27      I&O's Summary    27 May 2019 07:01  -  28 May 2019 07:00  --------------------------------------------------------  IN: 400 mL / OUT: 1701 mL / NET: -1301 mL    BNP  Serum Pro-Brain Natriuretic Peptide: 660 pg/mL (05-26-19 @ 20:02)    RADIOLOGY & ADDITIONAL STUDIES:  < from: CT Angio Abdomen and Pelvis w/ IV Cont (05.26.19 @ 18:10) >   EXAM:  CT ANGIO ABD PELV (W)AW IC                          PROCEDURE DATE:  05/26/2019          INTERPRETATION:    CTA (CT ANGIOGRAPHY) of the abdomen and pelvis    INDICATION: Gastrointestinal bleeding. Evaluate for gastrointestinal   bleed.    TECHNIQUE: CTA (CT ANGIOGRAPHY) of the abdomen and pelvis was performed.   A preliminary scan was performed without intravenous contrast material.   Intravenous contrast was then administered and images were obtained   during the arterial phase and during a delayed phase. Image   postprocessing was performed including the production of axial, coronal,   and sagittal multiplanar reformatted images as well as sagittal and   coronal maximum intensity projections (MIPs).     CONTRAST: 91 cc of Omnipaque 350 was demonstrated intravenously. 0 cc   discarded.    COMPARISON: None.    FINDINGS:    Active GI bleeding: None    Others:   lower chest: Unremarkable.    Liver: Question cirrhosis. No focal mass.     Biliary system: Gallbladder is normal in size. No calcified gallstones.   No biliary ductal dilatation.    Pancreas: Unremarkable.    Spleen: Punctate granuloma.    Adrenal glands: Unremarkable.    Kidneys: Normal size.. No hydronephrosis. Nonobstructing intrarenal   calculi.     Urinary Bladder: Unremarkable.    Reproductive organs: Unremarkable.     Bowel/Peritoneum: Thickened cecum and distal ascending colon.. Normal   appendix. Remaining bowel unremarkable. No ascites or extraluminal gas.     Lymph nodes: No lymphadenopathy.    Aorta/IVC: Diffusely atherosclerotic. Mesenteric arteries patent with   single origin of the SMA and celiac.    Abdominal wall: No hernia.    Bones/Soft tissues: No acute abnormality.          IMPRESSION:    No active GI bleeding.    Right-sided colitis/cecitis. Follow-up colonoscopy after resolution of   symptoms suggested.    < end of copied text >

## 2019-05-28 NOTE — PROGRESS NOTE ADULT - ASSESSMENT
65 yo male with pmhx of Afib on CardioXT s/p watchman on 4/2/19, CHF, and COPD presents with bright red blood per rectum admitted for likely lower GI bleed. Pt initially responded to PRBC'S. Am of 5/28 pts H/H dropped from 8.6 to 7.4. Ordered 2 more PRBC's and will repeat this afternoon. Will await GI for further recs and management.     Bright red blood per rectum likely 2/2 Lower GI bleed  -s/p PRBC 4 units, plasma 1 unit, platelets 1 unit in ED  -Initially H/H responded from 8.2 to 10.9  -CT abd shows right-sided colitis/cecitis  -serial H/H q6h for now  -c/w protonix 40 mg q daily  -GI consult consulted and appreciated. who wanted to observe pt for 24 hour after feeding  - Pt continues to have bloody BM with symptoms of dizziness when ambulating. H/H dropped from 8.5 yo 7.4 Transfused 2 more PRBC's and repeat H/H pending this afternoon.     Right sided Colitis   -CT abd shows right sided colitis/cecitis and pt has downtrending wbc ct.   -start clear liquids, npo after midnight for possible procedure  -c/w  ciprofloxacin 400 mg iv bid  -c/w metronidazole 500 mg iv q8h    Atrial fibrillation/ chronic   -holding Cardia XT given soft bp  -continue to monitor    Chronic CHF/ stable   -will hold lasix 40 mg po qdaily hold med given soft bp  -strict I/Os, daily weights    COPD/ stable   -not on home O2  - d/c'd albuterol for now and will monitor     Elevated BUN  -likely due to hypovolemia  -stable now. Will continue to monitor     Preventive measure  -dvt ppx: holding AC due to bleed risk, vcd boots

## 2019-05-28 NOTE — PROGRESS NOTE ADULT - SUBJECTIVE AND OBJECTIVE BOX
Patient seen and examined; chart reviewed. Ct scan reviewed and management discussed with Dr Soto.    Still having BRB with every BM about 3x's per day.  No fever.  Minimal abdominal pain.  No tenderness.  Hgb again dropped to 7.4 today from 9 post initial 4 units transfusion.  Currently receiving 5th unit prbc's.  Still NPO.      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  No dyspepsia  No abdominal pain.    No Nausea, vomiting.  No Bleeding.  No hematemesis.   No diarrhea.    No hematochesia.  No weight loss, anorexia.  No edema.      MEDICATIONS  (STANDING):  ciprofloxacin   IVPB      ciprofloxacin   IVPB 400 milliGRAM(s) IV Intermittent every 12 hours  lidocaine   Patch 1 Patch Transdermal daily  magnesium oxide 400 milliGRAM(s) Oral once  metoprolol succinate  milliGRAM(s) Oral daily  metroNIDAZOLE  IVPB 500 milliGRAM(s) IV Intermittent every 8 hours  metroNIDAZOLE  IVPB      pantoprazole  Injectable 40 milliGRAM(s) IV Push two times a day  polyethylene glycol/electrolyte Solution. 4000 milliLiter(s) Oral once  saccharomyces boulardii 250 milliGRAM(s) Oral two times a day    MEDICATIONS  (PRN):  acetaminophen   Tablet .. 650 milliGRAM(s) Oral every 6 hours PRN Temp greater or equal to 38C (100.4F), Mild Pain (1 - 3)  LORazepam   Injectable 2 milliGRAM(s) IV Push every 1 hour PRN CIWA-Ar score 8 or greater  morphine  - Injectable 1 milliGRAM(s) IV Push every 4 hours PRN Moderate Pain (4 - 6)  morphine  - Injectable 2 milliGRAM(s) IV Push every 6 hours PRN Severe Pain (7 - 10)      Allergies    Duricef (Rash)    Intolerances        Vital Signs Last 24 Hrs  T(C): 37.2 (28 May 2019 07:45), Max: 37.3 (27 May 2019 23:17)  T(F): 98.9 (28 May 2019 07:45), Max: 99.2 (27 May 2019 23:17)  HR: 85 (28 May 2019 07:45) (73 - 109)  BP: 87/57 (28 May 2019 07:45) (86/73 - 133/80)  BP(mean): 79 (27 May 2019 19:55) (74 - 83)  RR: 19 (28 May 2019 07:45) (18 - 28)  SpO2: 98% (28 May 2019 07:45) (97% - 100%)    PHYSICAL EXAM:    GENERAL: NAD, well-groomed, well-developed  HEAD:  Atraumatic, Normocephalic  EYES: EOMI, PERRLA, conjunctiva and sclera clear  ENMT: No tonsillar erythema, exudates, or enlargement; Moist mucous membranes, Good dentition, No lesions  NECK: Supple, No JVD, Normal thyroid  CHEST/LUNG: Clear to percussion bilaterally; No rales, rhonchi, wheezing, or rubs  HEART: Regular rate and rhythm; No murmurs, rubs, or gallops  ABDOMEN: Soft, Nontender, Nondistended; Bowel sounds present;  No HSM.  no MTR.  No distention.  TRACEY:  No lesions.  Small ext hemorrhoids.  Small amount of BRB.    EXTREMITIES:  2+ Peripheral Pulses, No clubbing, cyanosis, or edema.    LYMPH: No lymphadenopathy noted  SKIN: No rashes or lesions      LABS:                        7.4    5.9   )-----------( 149      ( 28 May 2019 06:57 )             22.4     05-28    135  |  101  |  12.0  ----------------------------<  128<H>  4.3   |  24.0  |  0.71    Ca    8.3<L>      28 May 2019 06:57  Phos  2.8     05-28  Mg     1.3     05-28    TPro  5.6<L>  /  Alb  3.2<L>  /  TBili  0.5  /  DBili  0.2  /  AST  14  /  ALT  17  /  AlkPhos  86  05-27    PT/INR - ( 26 May 2019 14:57 )   PT: 13.0 sec;   INR: 1.13 ratio         PTT - ( 26 May 2019 14:57 )  PTT:23.9 sec         RADIOLOGY & ADDITIONAL STUDIES:  CT scan: Mild diffuse cecal wall inflammation with surrounding dirty fat.  No perf or abscess.  Same for proximal AC.  No lesion in distal AC.  Rest of colon was normal.

## 2019-05-28 NOTE — CONSULT NOTE ADULT - ASSESSMENT
A/P: 63 y/o M with a PMHx of HFpEF (EF on MYA 05/20/2019 EF 60-65%), pulmonary HTN on lasix, COPD (not on home O2), Afib (s/p Watchman 04/02/19, off Xarelto), BPH, HTN, HLD, GERD, and EtOH abuse who presented to the ED with bright red blood per rectum. Patient states he was in his usual state of health until May 26th when he suddenly had multiple large bloody BM's. Patient states that he immediately felt lightheaded and short of breath, and was unable to ambulate due to severe weakness so he called EMS. Patient was found to have right sided colitis currently on cipro and flagyl, and anemia secondary to BRBPR s/p multiple PRBC transfusions. Patient is now being evaluated for colonoscopy, GI is requesting cardiac clearance. Patient states that prior to this event he was feeling at his baseline. Patient states he always has a degree of orthopnea, but no worsening of symptoms. Patient notes some right sided rib pain since admission, but otherwise is feeling a little better after blood transfusions. Pt denies any fevers, chills, CP, PND, LE swelling, N/V, headache, vision changes, or dysuria.   Troponin neg x 1    1. Constance-operative Cardiac Risk Stratification   - RCRI risk 0.9%  - Some rales and orthopnea on exam, would give Lasix 40mg PO today due to multiple PRBC infusions  - NST 12/2018 negative for ischemia  - No active chest pain, evidence of ischemia, decompensated CHF, significant valvular abnormality, or unstable arrhythmia and therefore no absolute cardiac contraindication to the planned surgical procedure.    2. HFpEF with Pulmonary HTN  - Patient intravascularly depleted but with some rales on exam  - Would give Lasix 40mg PO today, and evaluate fluid status daily  - Continue Toprol XL , Lisinopril  - ASA on hold due to GI bleed    3. Afib  - S/p Watchman 04/02/19  - Off Xarelto  - Afib with RVR with any ambulation, likely due to low H/H   - Continue Toprol Xl  - Restart Cartia when BP is stable

## 2019-05-28 NOTE — CONSULT NOTE ADULT - ATTENDING COMMENTS
I reviewed the above, reviewed the chart and spoke to PA in length. Pt seen and examined. I agree with a/p.

## 2019-05-28 NOTE — PROGRESS NOTE ADULT - SUBJECTIVE AND OBJECTIVE BOX
CC: Patient is a 64y old  Male who presents with a chief complaint of GI bleed     Patient seen and examined at bedside, Pt continues to complain of right sided chest pain, likely costochondritis. Pt also stated he continues to have bloody bowel movements. Hemoglobin dropped from 8.5 to 7.4. Repeat h/h ordered for this afternoon. As pt states he has some dizziness advised to only ambulate with assistance.   Patient not ambulating, NPO, voiding and last BM prior to admission.    ROS: Denies fever, chest pain, SOB, abdominal pain, diarrhea, constipation, calf pain.    VS:   Vital Signs Last 24 Hrs  T(C): 37.2 (28 May 2019 07:45), Max: 37.3 (27 May 2019 23:17)  T(F): 98.9 (28 May 2019 07:45), Max: 99.2 (27 May 2019 23:17)  HR: 85 (28 May 2019 07:45) (73 - 109)  BP: 87/57 (28 May 2019 07:45) (86/73 - 133/80)  BP(mean): 79 (27 May 2019 19:55) (74 - 83)  RR: 19 (28 May 2019 07:45) (18 - 28)  SpO2: 98% (28 May 2019 07:45) (97% - 100%)    Physical Exam:   Gen: NAD  HEENT: NCAT, EOMI, PERRLA, Pupils_  CVS: RRR, +S1/S2, no murmurs, rubs or gallops appreciated. Pain on right rib cage @ angle of 12th rib.   RESP: CTAB, no wheeze, rales, rhonchi  Abdomen: +BS, soft, ND, NT. no palpable flank tenderness or mass, no CVA tenderness. Ecchymosis on b/l lower back from previous fall   Ext: No cyanosis, edema or calf tenderness  Neuro: AAOx3, no focal deficits.    Labs:                        9.8    x     )-----------( x        ( 27 May 2019 02:16 )             29.0   05-26    135  |  101  |  28.0<H>  ----------------------------<  171<H>  5.2   |  23.0  |  1.17    Ca    8.6      26 May 2019 19:26    TPro  5.9<L>  /  Alb  3.3  /  TBili  0.6  /  DBili  x   /  AST  23  /  ALT  26  /  AlkPhos  105  05-26        Radiology:  < from: CT Angio Abdomen and Pelvis w/ IV Cont (05.26.19 @ 18:10) >  IMPRESSION:    No active GI bleeding.    Right-sided colitis/cecitis. Follow-up colonoscopy after resolution of   symptoms suggested.    < end of copied text >        Medications:  MEDICATIONS  (STANDING):  ALBUTerol/ipratropium for Nebulization 3 milliLiter(s) Nebulizer every 24 hours  ciprofloxacin   IVPB      ciprofloxacin   IVPB 400 milliGRAM(s) IV Intermittent every 12 hours  metroNIDAZOLE  IVPB 500 milliGRAM(s) IV Intermittent every 8 hours  metroNIDAZOLE  IVPB      pantoprazole  Injectable 40 milliGRAM(s) IV Push every 12 hours    MEDICATIONS  (PRN):
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
## 2019-05-29 ENCOUNTER — RESULT REVIEW (OUTPATIENT)
Age: 64
End: 2019-05-29

## 2019-05-29 LAB
ANION GAP SERPL CALC-SCNC: 10 MMOL/L — SIGNIFICANT CHANGE UP (ref 5–17)
BUN SERPL-MCNC: 10 MG/DL — SIGNIFICANT CHANGE UP (ref 8–20)
CALCIUM SERPL-MCNC: 8.2 MG/DL — LOW (ref 8.6–10.2)
CHLORIDE SERPL-SCNC: 103 MMOL/L — SIGNIFICANT CHANGE UP (ref 98–107)
CO2 SERPL-SCNC: 26 MMOL/L — SIGNIFICANT CHANGE UP (ref 22–29)
CREAT SERPL-MCNC: 0.68 MG/DL — SIGNIFICANT CHANGE UP (ref 0.5–1.3)
GLUCOSE SERPL-MCNC: 98 MG/DL — SIGNIFICANT CHANGE UP (ref 70–115)
HCT VFR BLD CALC: 24.7 % — LOW (ref 42–52)
HGB BLD-MCNC: 8.1 G/DL — LOW (ref 14–18)
MAGNESIUM SERPL-MCNC: 1.7 MG/DL — LOW (ref 1.8–2.6)
POTASSIUM SERPL-MCNC: 4.2 MMOL/L — SIGNIFICANT CHANGE UP (ref 3.5–5.3)
POTASSIUM SERPL-SCNC: 4.2 MMOL/L — SIGNIFICANT CHANGE UP (ref 3.5–5.3)
SODIUM SERPL-SCNC: 139 MMOL/L — SIGNIFICANT CHANGE UP (ref 135–145)

## 2019-05-29 PROCEDURE — 88342 IMHCHEM/IMCYTCHM 1ST ANTB: CPT | Mod: 26

## 2019-05-29 PROCEDURE — 45380 COLONOSCOPY AND BIOPSY: CPT

## 2019-05-29 PROCEDURE — 88305 TISSUE EXAM BY PATHOLOGIST: CPT | Mod: 26

## 2019-05-29 PROCEDURE — 99232 SBSQ HOSP IP/OBS MODERATE 35: CPT | Mod: GC

## 2019-05-29 RX ORDER — MAGNESIUM OXIDE 400 MG ORAL TABLET 241.3 MG
400 TABLET ORAL ONCE
Refills: 0 | Status: COMPLETED | OUTPATIENT
Start: 2019-05-29 | End: 2019-05-29

## 2019-05-29 RX ADMIN — Medication 100 MILLIGRAM(S): at 20:06

## 2019-05-29 RX ADMIN — MORPHINE SULFATE 2 MILLIGRAM(S): 50 CAPSULE, EXTENDED RELEASE ORAL at 12:02

## 2019-05-29 RX ADMIN — Medication 100 MILLIGRAM(S): at 12:06

## 2019-05-29 RX ADMIN — PANTOPRAZOLE SODIUM 40 MILLIGRAM(S): 20 TABLET, DELAYED RELEASE ORAL at 07:07

## 2019-05-29 RX ADMIN — Medication 40 MILLIGRAM(S): at 07:06

## 2019-05-29 RX ADMIN — Medication 200 MILLIGRAM(S): at 07:06

## 2019-05-29 RX ADMIN — MAGNESIUM OXIDE 400 MG ORAL TABLET 400 MILLIGRAM(S): 241.3 TABLET ORAL at 17:49

## 2019-05-29 RX ADMIN — MORPHINE SULFATE 2 MILLIGRAM(S): 50 CAPSULE, EXTENDED RELEASE ORAL at 19:55

## 2019-05-29 RX ADMIN — MORPHINE SULFATE 2 MILLIGRAM(S): 50 CAPSULE, EXTENDED RELEASE ORAL at 03:42

## 2019-05-29 RX ADMIN — Medication 250 MILLIGRAM(S): at 17:50

## 2019-05-29 RX ADMIN — MORPHINE SULFATE 2 MILLIGRAM(S): 50 CAPSULE, EXTENDED RELEASE ORAL at 12:52

## 2019-05-29 RX ADMIN — Medication 100 MILLIGRAM(S): at 05:07

## 2019-05-29 RX ADMIN — LIDOCAINE 1 PATCH: 4 CREAM TOPICAL at 17:51

## 2019-05-29 RX ADMIN — PANTOPRAZOLE SODIUM 40 MILLIGRAM(S): 20 TABLET, DELAYED RELEASE ORAL at 17:50

## 2019-05-29 RX ADMIN — Medication 250 MILLIGRAM(S): at 07:07

## 2019-05-29 RX ADMIN — Medication 200 MILLIGRAM(S): at 17:50

## 2019-05-29 RX ADMIN — LIDOCAINE 1 PATCH: 4 CREAM TOPICAL at 12:03

## 2019-05-29 NOTE — PROGRESS NOTE ADULT - SUBJECTIVE AND OBJECTIVE BOX
CC: Patient is a 64y old  Male who presents with a chief complaint of GI bleed     Patient seen and examined at bedside, S/P colonoscopy which showed internal hemorrhoids and ulceration of cecum. Biopsies taken and patient states feeling better. Currently pending repeat H/H. Pt had two BM's yesterday with blood. No lightheadedness today.     ROS: Denies fever, chest pain, SOB, abdominal pain, diarrhea, constipation, calf pain.    Vital Signs Last 24 Hrs  T(C): 36.9 (29 May 2019 08:20), Max: 37.1 (28 May 2019 15:10)  T(F): 98.4 (29 May 2019 08:20), Max: 98.7 (28 May 2019 15:10)  HR: 95 (29 May 2019 08:20) (70 - 102)  BP: 102/67 (29 May 2019 08:20) (95/60 - 129/69)  RR: 19 (29 May 2019 08:20) (19 - 20)  SpO2: 95% (29 May 2019 08:20) (95% - 97%)    Physical Exam:   Gen: NAD  HEENT: NCAT, EOMI, PERRLA, Pupils_  CVS: RRR, +S1/S2, no murmurs, rubs or gallops appreciated. Pain on right rib cage @ angle of 12th rib.   RESP: CTAB, no wheeze, rales, rhonchi  Abdomen: +BS, soft, ND, NT. no palpable flank tenderness or mass, no CVA tenderness. Ecchymosis on b/l lower back from previous fall   Ext: No cyanosis, edema or calf tenderness  Neuro: AAOx3, no focal deficits.    Labs:                        7.8    x     )-----------( x        ( 28 May 2019 22:23 )             23.4   05-28    135  |  101  |  12.0  ----------------------------<  128<H>  4.3   |  24.0  |  0.71    Ca    8.3<L>      28 May 2019 06:57  Phos  2.8     05-28  Mg     1.3     05-28    TPro  5.6<L>  /  Alb  3.2<L>  /  TBili  0.5  /  DBili  0.2  /  AST  14  /  ALT  17  /  AlkPhos  86  05-27      Radiology:  < from: CT Angio Abdomen and Pelvis w/ IV Cont (05.26.19 @ 18:10) >  IMPRESSION:    No active GI bleeding.    Right-sided colitis/cecitis. Follow-up colonoscopy after resolution of   symptoms suggested.    < end of copied text >        Medications:  MEDICATIONS  (STANDING):  ALBUTerol/ipratropium for Nebulization 3 milliLiter(s) Nebulizer every 24 hours  ciprofloxacin   IVPB      ciprofloxacin   IVPB 400 milliGRAM(s) IV Intermittent every 12 hours  metroNIDAZOLE  IVPB 500 milliGRAM(s) IV Intermittent every 8 hours  metroNIDAZOLE  IVPB      pantoprazole  Injectable 40 milliGRAM(s) IV Push every 12 hours    MEDICATIONS  (PRN): CC: Patient is a 64y old  Male who presents with a chief complaint of GI bleed     Patient seen and examined at bedside, S/P colonoscopy which showed internal hemorrhoids and ulceration of cecum. Biopsies taken and patient states feeling better. Currently pending repeat H/H. Pt had two BM's yesterday with blood. No lightheadedness today.     ROS: Denies fever, chest pain, SOB, abdominal pain, diarrhea, constipation, calf pain.    Vital Signs Last 24 Hrs  T(C): 36.9 (29 May 2019 08:20), Max: 37.1 (28 May 2019 15:10)  T(F): 98.4 (29 May 2019 08:20), Max: 98.7 (28 May 2019 15:10)  HR: 95 (29 May 2019 08:20) (70 - 102)  BP: 102/67 (29 May 2019 08:20) (95/60 - 129/69)  BP(mean): --  RR: 19 (29 May 2019 08:20) (19 - 20)  SpO2: 95% (29 May 2019 08:20) (95% - 97%)    Physical Exam:   Gen: NAD  HEENT: NCAT, EOMI, PERRLA, Pupils_  CVS: RRR, +S1/S2, no murmurs, rubs or gallops appreciated. Pain on right rib cage @ angle of 12th rib.   RESP: CTAB, no wheeze, rales, rhonchi  Abdomen: +BS, soft, ND, NT. no palpable flank tenderness or mass, no CVA tenderness. Ecchymosis on b/l lower back from previous fall   Ext: No cyanosis, edema or calf tenderness  Neuro: AAOx3, no focal deficits.    Labs:                        7.8    x     )-----------( x        ( 28 May 2019 22:23 )             23.4   05-28    135  |  101  |  12.0  ----------------------------<  128<H>  4.3   |  24.0  |  0.71    Ca    8.3<L>      28 May 2019 06:57  Phos  2.8     05-28  Mg     1.3     05-28    TPro  5.6<L>  /  Alb  3.2<L>  /  TBili  0.5  /  DBili  0.2  /  AST  14  /  ALT  17  /  AlkPhos  86  05-27      Radiology:  < from: CT Angio Abdomen and Pelvis w/ IV Cont (05.26.19 @ 18:10) >  IMPRESSION:    No active GI bleeding.    Right-sided colitis/cecitis. Follow-up colonoscopy after resolution of   symptoms suggested.    < end of copied text >        Medications:  MEDICATIONS  (STANDING):  ALBUTerol/ipratropium for Nebulization 3 milliLiter(s) Nebulizer every 24 hours  ciprofloxacin   IVPB      ciprofloxacin   IVPB 400 milliGRAM(s) IV Intermittent every 12 hours  metroNIDAZOLE  IVPB 500 milliGRAM(s) IV Intermittent every 8 hours  metroNIDAZOLE  IVPB      pantoprazole  Injectable 40 milliGRAM(s) IV Push every 12 hours    MEDICATIONS  (PRN):

## 2019-05-29 NOTE — BRIEF OPERATIVE NOTE - COMMENTS
A/P  Ulcerated cecum (almost whole cecum involved). No active bleeding. Biopsies taken to r/o adenocarcinoma.   Hold anticoagulation for now  - may start clear liquids today A/P  Ulcerated cecum (almost whole cecum involved). No active bleeding. Biopsies taken to r/o adenocarcinoma.   hemorrhoids  Hold anticoagulation for now  - may start clear liquids today A/P  Ulcerated cecum (almost whole cecum involved). Possibly ischemic ulcer. No active bleeding. Biopsies taken to r/o adenocarcinoma.   hemorrhoids  Hold anticoagulation for now  - may start clear liquids today

## 2019-05-29 NOTE — PROGRESS NOTE ADULT - ASSESSMENT
65 yo male with pmhx of Afib on CardioXT s/p watchman on 4/2/19, CHF, and COPD presents with bright red blood per rectum admitted for likely lower GI bleed. Pt initially responded to PRBC'S. Am of 5/28 pts H/H dropped from 8.6 to 7.4. Ordered 2 more PRBC's and Hemoglobin improved to 7.8 S/P colonoscopy on 5/29 which showed internal hemmoroids and ulceration of cecum. GI advanced pts diet and will observe for 24 hours pending repeat H/H.     Bright red blood per rectum likely 2/2 Lower GI bleed  -s/p PRBC 4 units, plasma 1 unit, platelets 1 unit in ED  -Initially H/H responded from 8.2 to 10.9  -CT abd shows right-sided colitis/cecitis  -serial H/H q6h for now  -c/w protonix 40 mg q daily  -GI consult consulted and appreciated. who wanted to observe pt for 24 hour after feeding initially but pt had bloody BM's with H/H drop from 8.6 to 7.4 GI performed colonoscopy on 5/29 and found internal hemmoroids and ulceration of cecum.   - observing for 24 hours with diet advanced to clears.     Right sided Colitis   -CT abd shows right sided colitis/cecitis and pt has downtrending wbc ct.   -start clear liquids, npo after midnight for possible procedure  -c/w  ciprofloxacin 400 mg iv bid  -c/w metronidazole 500 mg iv q8h  -Colonoscopy shows ulceration of cecum with possible ischemic ulcer without current bleeding.   - PRN morphine for pain q 4 for moderate to severe     Atrial fibrillation/ chronic   - Cardio consulted for GI clearance  -holding Cardia XT given soft bp. May restart once BP is stable   - may continue with toprol   -continue to monitor    Chronic CHF/ stable   -will hold lasix 40 mg po qdaily hold med given soft bp  -strict I/Os, daily weights    COPD/ stable   -not on home O2  - d/c'd albuterol for now and will monitor     Elevated BUN  -likely due to hypovolemia  -stable now. Will continue to monitor     Preventive measure  -dvt ppx: holding AC due to bleed risk, vcd boots

## 2019-05-29 NOTE — BRIEF OPERATIVE NOTE - OPERATION/FINDINGS
IN the cecum, there was friable ulcer (?lesion) which was through the entire cecum.  NO active bleeding. Biopsies taken to R/O adenocarcinoma. IN the cecum, there was friable ulcer (?lesion) which was through the entire cecum.  NO active bleeding. Biopsies taken to R/O adenocarcinoma. the transverse, sigmoid and descending colon normal. + internal hemorrohoids IN the cecum, there was friable ulcer (?lesion) which was through the entire cecum.  NO active bleeding. Biopsies taken to R/O adenocarcinoma, R/O ischemia.  The ascending , transverse, sigmoid and descending colon normal. + internal hemorrohoids

## 2019-05-30 LAB
ANION GAP SERPL CALC-SCNC: 8 MMOL/L — SIGNIFICANT CHANGE UP (ref 5–17)
BASOPHILS # BLD AUTO: 0 K/UL — SIGNIFICANT CHANGE UP (ref 0–0.2)
BASOPHILS NFR BLD AUTO: 0.2 % — SIGNIFICANT CHANGE UP (ref 0–2)
BUN SERPL-MCNC: 9 MG/DL — SIGNIFICANT CHANGE UP (ref 8–20)
CALCIUM SERPL-MCNC: 8.5 MG/DL — LOW (ref 8.6–10.2)
CHLORIDE SERPL-SCNC: 102 MMOL/L — SIGNIFICANT CHANGE UP (ref 98–107)
CO2 SERPL-SCNC: 26 MMOL/L — SIGNIFICANT CHANGE UP (ref 22–29)
CREAT SERPL-MCNC: 0.75 MG/DL — SIGNIFICANT CHANGE UP (ref 0.5–1.3)
EOSINOPHIL # BLD AUTO: 0.3 K/UL — SIGNIFICANT CHANGE UP (ref 0–0.5)
EOSINOPHIL NFR BLD AUTO: 5.7 % — HIGH (ref 0–5)
GLUCOSE SERPL-MCNC: 101 MG/DL — SIGNIFICANT CHANGE UP (ref 70–115)
HCT VFR BLD CALC: 24.3 % — LOW (ref 42–52)
HGB BLD-MCNC: 8.1 G/DL — LOW (ref 14–18)
LYMPHOCYTES # BLD AUTO: 1 K/UL — SIGNIFICANT CHANGE UP (ref 1–4.8)
LYMPHOCYTES # BLD AUTO: 19.6 % — LOW (ref 20–55)
MAGNESIUM SERPL-MCNC: 1.7 MG/DL — SIGNIFICANT CHANGE UP (ref 1.6–2.6)
MCHC RBC-ENTMCNC: 30.5 PG — SIGNIFICANT CHANGE UP (ref 27–31)
MCHC RBC-ENTMCNC: 33.3 G/DL — SIGNIFICANT CHANGE UP (ref 32–36)
MCV RBC AUTO: 91.4 FL — SIGNIFICANT CHANGE UP (ref 80–94)
MONOCYTES # BLD AUTO: 0.5 K/UL — SIGNIFICANT CHANGE UP (ref 0–0.8)
MONOCYTES NFR BLD AUTO: 10.2 % — HIGH (ref 3–10)
NEUTROPHILS # BLD AUTO: 3.1 K/UL — SIGNIFICANT CHANGE UP (ref 1.8–8)
NEUTROPHILS NFR BLD AUTO: 63.9 % — SIGNIFICANT CHANGE UP (ref 37–73)
PHOSPHATE SERPL-MCNC: 3.8 MG/DL — SIGNIFICANT CHANGE UP (ref 2.4–4.7)
PLATELET # BLD AUTO: 139 K/UL — LOW (ref 150–400)
POTASSIUM SERPL-MCNC: 3.9 MMOL/L — SIGNIFICANT CHANGE UP (ref 3.5–5.3)
POTASSIUM SERPL-SCNC: 3.9 MMOL/L — SIGNIFICANT CHANGE UP (ref 3.5–5.3)
RBC # BLD: 2.66 M/UL — LOW (ref 4.6–6.2)
RBC # FLD: 16.7 % — HIGH (ref 11–15.6)
SODIUM SERPL-SCNC: 136 MMOL/L — SIGNIFICANT CHANGE UP (ref 135–145)
WBC # BLD: 4.9 K/UL — SIGNIFICANT CHANGE UP (ref 4.8–10.8)
WBC # FLD AUTO: 4.9 K/UL — SIGNIFICANT CHANGE UP (ref 4.8–10.8)

## 2019-05-30 PROCEDURE — 99233 SBSQ HOSP IP/OBS HIGH 50: CPT | Mod: GC

## 2019-05-30 PROCEDURE — 99232 SBSQ HOSP IP/OBS MODERATE 35: CPT

## 2019-05-30 RX ORDER — DILTIAZEM HCL 120 MG
60 CAPSULE, EXT RELEASE 24 HR ORAL EVERY 6 HOURS
Refills: 0 | Status: DISCONTINUED | OUTPATIENT
Start: 2019-05-30 | End: 2019-05-31

## 2019-05-30 RX ORDER — METOPROLOL TARTRATE 50 MG
5 TABLET ORAL ONCE
Refills: 0 | Status: COMPLETED | OUTPATIENT
Start: 2019-05-30 | End: 2019-05-30

## 2019-05-30 RX ORDER — DILTIAZEM HCL 120 MG
240 CAPSULE, EXT RELEASE 24 HR ORAL DAILY
Refills: 0 | Status: DISCONTINUED | OUTPATIENT
Start: 2019-05-30 | End: 2019-05-30

## 2019-05-30 RX ADMIN — Medication 40 MILLIGRAM(S): at 05:27

## 2019-05-30 RX ADMIN — Medication 60 MILLIGRAM(S): at 17:47

## 2019-05-30 RX ADMIN — MORPHINE SULFATE 2 MILLIGRAM(S): 50 CAPSULE, EXTENDED RELEASE ORAL at 02:14

## 2019-05-30 RX ADMIN — Medication 5 MILLIGRAM(S): at 09:27

## 2019-05-30 RX ADMIN — Medication 250 MILLIGRAM(S): at 17:47

## 2019-05-30 RX ADMIN — MORPHINE SULFATE 2 MILLIGRAM(S): 50 CAPSULE, EXTENDED RELEASE ORAL at 14:23

## 2019-05-30 RX ADMIN — PANTOPRAZOLE SODIUM 40 MILLIGRAM(S): 20 TABLET, DELAYED RELEASE ORAL at 05:27

## 2019-05-30 RX ADMIN — Medication 100 MILLIGRAM(S): at 05:27

## 2019-05-30 RX ADMIN — Medication 250 MILLIGRAM(S): at 05:27

## 2019-05-30 RX ADMIN — MORPHINE SULFATE 2 MILLIGRAM(S): 50 CAPSULE, EXTENDED RELEASE ORAL at 08:21

## 2019-05-30 RX ADMIN — LIDOCAINE 1 PATCH: 4 CREAM TOPICAL at 20:57

## 2019-05-30 RX ADMIN — Medication 240 MILLIGRAM(S): at 09:46

## 2019-05-30 RX ADMIN — MORPHINE SULFATE 2 MILLIGRAM(S): 50 CAPSULE, EXTENDED RELEASE ORAL at 15:00

## 2019-05-30 RX ADMIN — MORPHINE SULFATE 2 MILLIGRAM(S): 50 CAPSULE, EXTENDED RELEASE ORAL at 21:21

## 2019-05-30 RX ADMIN — Medication 200 MILLIGRAM(S): at 05:26

## 2019-05-30 RX ADMIN — LIDOCAINE 1 PATCH: 4 CREAM TOPICAL at 22:33

## 2019-05-30 RX ADMIN — MORPHINE SULFATE 2 MILLIGRAM(S): 50 CAPSULE, EXTENDED RELEASE ORAL at 21:06

## 2019-05-30 RX ADMIN — MORPHINE SULFATE 2 MILLIGRAM(S): 50 CAPSULE, EXTENDED RELEASE ORAL at 08:36

## 2019-05-30 RX ADMIN — Medication 100 MILLIGRAM(S): at 05:26

## 2019-05-30 RX ADMIN — PANTOPRAZOLE SODIUM 40 MILLIGRAM(S): 20 TABLET, DELAYED RELEASE ORAL at 17:47

## 2019-05-30 RX ADMIN — Medication 100 MILLIGRAM(S): at 12:22

## 2019-05-30 RX ADMIN — Medication 200 MILLIGRAM(S): at 17:46

## 2019-05-30 RX ADMIN — Medication 100 MILLIGRAM(S): at 22:32

## 2019-05-30 RX ADMIN — LIDOCAINE 1 PATCH: 4 CREAM TOPICAL at 11:49

## 2019-05-30 NOTE — PROGRESS NOTE ADULT - ASSESSMENT
63 yo male with pmhx of Afib on CardioXT s/p watchman on 4/2/19, CHF, and COPD presents with bright red blood per rectum admitted for likely lower GI bleed. Pt initially responded to PRBC'S. Am of 5/28 pts H/H dropped from 8.6 to 7.4. Ordered 2 more PRBC's and Hemoglobin improved to 7.8 S/P colonoscopy on 5/29 which showed internal hemmoroids and ulceration of cecum. GI states ulcers are deep and likely ischemic. Would like 48 hour monitoring. Diet advanced from clears to soft with observation.    Bright red blood per rectum likely 2/2 Lower GI bleed  -s/p PRBC 4 units, plasma 1 unit, platelets 1 unit in ED  -Initially H/H responded from 8.2 to 10.9  -CT abd shows right-sided colitis/cecitis  -serial H/H q6h for now  -c/w protonix 40 mg q daily  -GI consult consulted and appreciated. who wanted to observe pt for 24 hour after feeding initially but pt had bloody BM's with H/H drop from 8.6 to 7.4 GI performed colonoscopy on 5/29 and found internal hemmoroids and ulceration of cecum.   - observing fo r48 hours. Diet advanced from clears to soft     Right sided Colitis   -CT abd shows right sided colitis/cecitis and pt has downtrending wbc ct.   -start clear liquids, npo after midnight for possible procedure  -c/w  ciprofloxacin 400 mg iv bid  -c/w metronidazole 500 mg iv q8h  -Colonoscopy shows ulceration of cecum with possible ischemic ulcer without current bleeding.   - PRN morphine for pain q 4 for moderate to severe     Atrial fibrillation/ chronic   - Cardio consulted for GI clearance  -holding Cardia XT given soft bp. May restart once BP is stable   - may continue with toprol   -continue to monitor    Chronic CHF/ stable   -will hold lasix 40 mg po qdaily hold med given soft bp  -strict I/Os, daily weights    COPD/ stable   -not on home O2  - d/c'd albuterol for now and will monitor     Elevated BUN  -likely due to hypovolemia  -stable now. Will continue to monitor     Preventive measure  -dvt ppx: holding AC due to bleed risk, vcd boots 63 yo male with pmhx of Afib on CardioXT s/p watchman on 4/2/19, CHF, and COPD presents with bright red blood per rectum admitted for likely lower GI bleed. Pt initially responded to PRBC'S. Am of 5/28 pts H/H dropped from 8.6 to 7.4. Ordered 2 more PRBC's and Hemoglobin improved to 7.8 S/P colonoscopy on 5/29 which showed internal hemmoroids and ulceration of cecum. GI states ulcers are deep and likely ischemic. Would like 48 hour monitoring. Diet advanced from clears to soft with observation.    Bright red blood per rectum likely 2/2 Lower GI bleed  -s/p PRBC 4 units, plasma 1 unit, platelets 1 unit in ED  -Initially H/H responded from 8.2 to 10.9  -CT abd shows right-sided colitis/cecitis  -serial H/H q6h for now  -c/w protonix 40 mg q daily  -GI consult consulted and appreciated. who wanted to observe pt for 24 hour after feeding initially but pt had bloody BM's with H/H drop from 8.6 to 7.4 GI performed colonoscopy on 5/29 and found internal hemmoroids and ulceration of cecum.   - observing fo r48 hours. Diet advanced from clears to soft     Right sided Colitis   -CT abd shows right sided colitis/cecitis and pt has downtrending wbc ct.   -start clear liquids, npo after midnight for possible procedure  -c/w  ciprofloxacin 400 mg iv bid  -c/w metronidazole 500 mg iv q8h  -Colonoscopy shows ulceration of cecum with possible ischemic ulcer without current bleeding.   - PRN morphine for pain q 4 for moderate to severe     Atrial fibrillation/ with rapid HR   - Cardio consulted for GI clearance  -Cardia XT was held earlier given soft bp. will restart   - may continue with toprol   -transfer to [facility]     Chronic CHF/ stable   -will hold lasix 40 mg po qdaily hold med given soft bp  -strict I/Os, daily weights    COPD/ stable   -not on home O2  - d/c'd albuterol for now and will monitor     Elevated BUN  -likely due to hypovolemia  -stable now. Will continue to monitor     Preventive measure  -dvt ppx: holding AC due to bleed risk, vcd boots

## 2019-05-30 NOTE — PROGRESS NOTE ADULT - ASSESSMENT
Seemingly resolved lower GI bleeding.  Endoscopic findings suggest ischemic colitis or the Cecum.    S/P Tx 5 units prbc's.  Stable Hgb.  No more clinical bleeding.    Suggest monitor for tolerance of the DASH/ soft diet.  Follow Hgb's. Would observe in house for another day to insure stability.  Awaiting path.

## 2019-05-30 NOTE — PROGRESS NOTE ADULT - SUBJECTIVE AND OBJECTIVE BOX
CC: Patient is a 64y old  Male who presents with a chief complaint of GI bleed     Patient seen and examined at bedside, S/P colonoscopy on 5/29 which showed internal hemorrhoids and ulceration of cecum. As per Dr. Yanez ulceration and deep and ischemic. Pt explained he needs two days of monitoring. Pt claims he had no blood in his 3 BM's over night. Requesting food. Diet advance to soft, and will observe. No other complaints outside of right sided abdominal pain.      ROS: Denies fever, chest pain, SOB, abdominal pain, diarrhea, constipation, calf pain.    Vital Signs Last 24 Hrs    T(C): 36.9 (29 May 2019 23:23), Max: 37 (29 May 2019 15:10)  T(F): 98.5 (29 May 2019 23:23), Max: 98.6 (29 May 2019 15:10)  HR: 103 (30 May 2019 05:11) (92 - 103)  BP: 130/80 (30 May 2019 05:11) (102/67 - 130/80)  RR: 20 (29 May 2019 23:23) (19 - 20)  SpO2: 95% (29 May 2019 23:23) (95% - 100%)    Physical Exam:   Gen: NAD  HEENT: NCAT, EOMI, PERRLA, Pupils_  CVS: RRR, +S1/S2, no murmurs, rubs or gallops appreciated. Pain on right rib cage @ angle of 12th rib.   RESP: CTAB, no wheeze, rales, rhonchi  Abdomen: +BS, soft, ND, NT. no palpable flank tenderness or mass, no CVA tenderness. Ecchymosis on b/l lower back from previous fall   Ext: No cyanosis, edema or calf tenderness  Neuro: AAOx3, no focal deficits.    Labs:                                   8.1    x     )-----------( x        ( 29 May 2019 13:34 )             24.7   05-29    139  |  103  |  10.0  ----------------------------<  98  4.2   |  26.0  |  0.68    Ca    8.2<L>      29 May 2019 13:30  Mg     1.7     05-29        Radiology:  < from: CT Angio Abdomen and Pelvis w/ IV Cont (05.26.19 @ 18:10) >  IMPRESSION:    No active GI bleeding.    Right-sided colitis/cecitis. Follow-up colonoscopy after resolution of   symptoms suggested.    < end of copied text >        Medications:  MEDICATIONS  (STANDING):  ALBUTerol/ipratropium for Nebulization 3 milliLiter(s) Nebulizer every 24 hours  ciprofloxacin   IVPB      ciprofloxacin   IVPB 400 milliGRAM(s) IV Intermittent every 12 hours  metroNIDAZOLE  IVPB 500 milliGRAM(s) IV Intermittent every 8 hours  metroNIDAZOLE  IVPB      pantoprazole  Injectable 40 milliGRAM(s) IV Push every 12 hours    MEDICATIONS  (PRN):

## 2019-05-30 NOTE — PROGRESS NOTE ADULT - SUBJECTIVE AND OBJECTIVE BOX
Patient seen and examined; chart reviewed.  Results of yesterday's colonoscopy were noted.  Tolerated well.  Pictures appear to show findings of ischemic colitis.  No overt tumor.  Path pending.  Denies any new abdominal pain.  Still with persistent right flank pain.  Reports that he had 3 brown small stools after the colonoscopy.  No bleeding post procedure.  Tolerated Clears OK.  Just advanced to DASH Soft Diet.   Last Hgb: 8.1 yesterday at 1330.  S/p Transfusion of 5 units prbc's, total.      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  No dyspepsia  No abdominal pain.    No Nausea, vomiting.  No Bleeding.  No hematemesis.   No diarrhea.    No hematochesia.  No weight loss, anorexia.  No edema.      MEDICATIONS  (STANDING):  ciprofloxacin   IVPB      ciprofloxacin   IVPB 400 milliGRAM(s) IV Intermittent every 12 hours  furosemide    Tablet 40 milliGRAM(s) Oral daily  lidocaine   Patch 1 Patch Transdermal daily  metoprolol succinate  milliGRAM(s) Oral daily  metroNIDAZOLE  IVPB 500 milliGRAM(s) IV Intermittent every 8 hours  metroNIDAZOLE  IVPB      pantoprazole  Injectable 40 milliGRAM(s) IV Push two times a day  saccharomyces boulardii 250 milliGRAM(s) Oral two times a day    MEDICATIONS  (PRN):  acetaminophen   Tablet .. 650 milliGRAM(s) Oral every 6 hours PRN Temp greater or equal to 38C (100.4F), Mild Pain (1 - 3)  LORazepam   Injectable 2 milliGRAM(s) IV Push every 1 hour PRN CIWA-Ar score 8 or greater  morphine  - Injectable 1 milliGRAM(s) IV Push every 4 hours PRN Moderate Pain (4 - 6)  morphine  - Injectable 2 milliGRAM(s) IV Push every 6 hours PRN Severe Pain (7 - 10)      Allergies    Duricef (Rash)    Intolerances        Vital Signs Last 24 Hrs  T(C): 36.9 (29 May 2019 23:23), Max: 37 (29 May 2019 15:10)  T(F): 98.5 (29 May 2019 23:23), Max: 98.6 (29 May 2019 15:10)  HR: 103 (30 May 2019 05:11) (92 - 103)  BP: 130/80 (30 May 2019 05:11) (102/67 - 130/80)  BP(mean): --  RR: 20 (29 May 2019 23:23) (19 - 20)  SpO2: 95% (29 May 2019 23:23) (95% - 100%)    PHYSICAL EXAM:    GENERAL: NAD, well-groomed, well-developed  HEAD:  Atraumatic, Normocephalic  EYES: EOMI, PERRLA, conjunctiva and sclera clear  ENMT: No tonsillar erythema, exudates, or enlargement; Moist mucous membranes, Good dentition, No lesions  NECK: Supple, No JVD, Normal thyroid  CHEST/LUNG: Clear to percussion bilaterally; No rales, rhonchi, wheezing, or rubs  HEART: Regular rate and rhythm; No murmurs, rubs, or gallops  ABDOMEN: Soft, Nontender, Nondistended; Bowel sounds present  EXTREMITIES:  2+ Peripheral Pulses, No clubbing, cyanosis, or edema  LYMPH: No lymphadenopathy noted  SKIN: No rashes or lesions      LABS:                        8.1    x     )-----------( x        ( 29 May 2019 13:34 )             24.7     05-29    139  |  103  |  10.0  ----------------------------<  98  4.2   |  26.0  |  0.68    Ca    8.2<L>      29 May 2019 13:30  Mg     1.7     05-29               RADIOLOGY & ADDITIONAL STUDIES:

## 2019-05-30 NOTE — PROGRESS NOTE ADULT - SUBJECTIVE AND OBJECTIVE BOX
pt known to me from outpatient setting and s/p recent Watchman procedure. He is now off anticoagulation s/p successful Watchman implant. Typically would continue ASA and plavix until 6 mo post Watchman implant, and then ASA indefinately.   Given severe GI bleed reasonable to hold antiplatelet agents for now. Would restart ASA 81mg qd when feasible from GI perspective.

## 2019-05-31 LAB — SURGICAL PATHOLOGY STUDY: SIGNIFICANT CHANGE UP

## 2019-05-31 PROCEDURE — 99232 SBSQ HOSP IP/OBS MODERATE 35: CPT | Mod: GC

## 2019-05-31 PROCEDURE — 99232 SBSQ HOSP IP/OBS MODERATE 35: CPT

## 2019-05-31 RX ORDER — ASPIRIN/CALCIUM CARB/MAGNESIUM 324 MG
81 TABLET ORAL DAILY
Refills: 0 | Status: DISCONTINUED | OUTPATIENT
Start: 2019-05-31 | End: 2019-06-01

## 2019-05-31 RX ORDER — DILTIAZEM HCL 120 MG
30 CAPSULE, EXT RELEASE 24 HR ORAL EVERY 6 HOURS
Refills: 0 | Status: DISCONTINUED | OUTPATIENT
Start: 2019-05-31 | End: 2019-06-01

## 2019-05-31 RX ORDER — SODIUM CHLORIDE 9 MG/ML
500 INJECTION INTRAMUSCULAR; INTRAVENOUS; SUBCUTANEOUS
Refills: 0 | Status: COMPLETED | OUTPATIENT
Start: 2019-05-31 | End: 2019-05-31

## 2019-05-31 RX ORDER — ACETAMINOPHEN 500 MG
1000 TABLET ORAL ONCE
Refills: 0 | Status: COMPLETED | OUTPATIENT
Start: 2019-05-31 | End: 2019-05-31

## 2019-05-31 RX ORDER — SODIUM CHLORIDE 9 MG/ML
1000 INJECTION INTRAMUSCULAR; INTRAVENOUS; SUBCUTANEOUS
Refills: 0 | Status: DISCONTINUED | OUTPATIENT
Start: 2019-05-31 | End: 2019-06-01

## 2019-05-31 RX ORDER — ACETAMINOPHEN 500 MG
975 TABLET ORAL ONCE
Refills: 0 | Status: COMPLETED | OUTPATIENT
Start: 2019-05-31 | End: 2019-05-31

## 2019-05-31 RX ADMIN — PANTOPRAZOLE SODIUM 40 MILLIGRAM(S): 20 TABLET, DELAYED RELEASE ORAL at 17:21

## 2019-05-31 RX ADMIN — LIDOCAINE 1 PATCH: 4 CREAM TOPICAL at 23:46

## 2019-05-31 RX ADMIN — Medication 975 MILLIGRAM(S): at 10:52

## 2019-05-31 RX ADMIN — SODIUM CHLORIDE 75 MILLILITER(S): 9 INJECTION INTRAMUSCULAR; INTRAVENOUS; SUBCUTANEOUS at 17:00

## 2019-05-31 RX ADMIN — Medication 200 MILLIGRAM(S): at 06:55

## 2019-05-31 RX ADMIN — LIDOCAINE 1 PATCH: 4 CREAM TOPICAL at 21:05

## 2019-05-31 RX ADMIN — Medication 100 MILLIGRAM(S): at 05:43

## 2019-05-31 RX ADMIN — LIDOCAINE 1 PATCH: 4 CREAM TOPICAL at 11:14

## 2019-05-31 RX ADMIN — Medication 975 MILLIGRAM(S): at 09:52

## 2019-05-31 RX ADMIN — Medication 250 MILLIGRAM(S): at 05:43

## 2019-05-31 RX ADMIN — Medication 200 MILLIGRAM(S): at 17:21

## 2019-05-31 RX ADMIN — Medication 100 MILLIGRAM(S): at 13:14

## 2019-05-31 RX ADMIN — Medication 250 MILLIGRAM(S): at 17:21

## 2019-05-31 RX ADMIN — PANTOPRAZOLE SODIUM 40 MILLIGRAM(S): 20 TABLET, DELAYED RELEASE ORAL at 05:42

## 2019-05-31 RX ADMIN — Medication 1000 MILLIGRAM(S): at 20:25

## 2019-05-31 RX ADMIN — Medication 100 MILLIGRAM(S): at 21:11

## 2019-05-31 RX ADMIN — SODIUM CHLORIDE 999.9 MILLILITER(S): 9 INJECTION INTRAMUSCULAR; INTRAVENOUS; SUBCUTANEOUS at 13:01

## 2019-05-31 RX ADMIN — Medication 81 MILLIGRAM(S): at 13:14

## 2019-05-31 RX ADMIN — Medication 400 MILLIGRAM(S): at 19:41

## 2019-05-31 RX ADMIN — MORPHINE SULFATE 2 MILLIGRAM(S): 50 CAPSULE, EXTENDED RELEASE ORAL at 03:40

## 2019-05-31 RX ADMIN — MORPHINE SULFATE 2 MILLIGRAM(S): 50 CAPSULE, EXTENDED RELEASE ORAL at 03:55

## 2019-05-31 RX ADMIN — Medication 40 MILLIGRAM(S): at 05:43

## 2019-05-31 NOTE — PROGRESS NOTE ADULT - NSHPATTENDINGPLANDISCUSS_GEN_ALL_CORE
patient , resident team, rn, cm
patient, GI, resident team, rn
patient, resident team , rn
patient, resident team, rn , cardio, gi
patient, resident team, rn at bedside

## 2019-05-31 NOTE — PROGRESS NOTE ADULT - ASSESSMENT
65 yo male with pmhx of Afib on CardioXT s/p watchman on 4/2/19, CHF, and COPD presents with bright red blood per rectum admitted for likely lower GI bleed. Pt initially responded to PRBC'S. Am of 5/28 pts H/H dropped from 8.6 to 7.4. Ordered 2 more PRBC's and Hemoglobin improved to 7.8 S/P colonoscopy on 5/29 which showed internal hemmoroids and ulceration of cecum. GI states ulcers are deep and likely ischemic. Pt had soft bp and low heart rate this am so pt to be monitored for 24 more hours and ASA 81 may be restarted.     Bright red blood per rectum likely 2/2 Lower GI bleed  -s/p PRBC 4 units, plasma 1 unit, platelets 1 unit in ED  -Initially H/H responded from 8.2 to 10.9  -CT abd shows right-sided colitis/cecitis  -serial H/H q6h for now  -c/w protonix 40 mg q daily  -GI consult consulted and appreciated. who wanted to observe pt for 24 hour after feeding initially but pt had bloody BM's with H/H drop from 8.6 to 7.4 GI performed colonoscopy on 5/29 and found internal hemmoroids and ulceration of cecum.   - Cleared by GI but heart rate low this am so will observe for 24 more hours after restarting asa 81.     Right sided Colitis   -CT abd shows right sided colitis/cecitis and pt has downtrending wbc ct.   -start clear liquids, npo after midnight for possible procedure  -c/w  ciprofloxacin 400 mg iv bid  -c/w metronidazole 500 mg iv q8h  -Colonoscopy shows ulceration of cecum with possible ischemic ulcer without current bleeding.   - PRN morphine for pain q 4 for moderate to severe     Atrial fibrillation/ with rapid HR   - Cardio consulted for GI clearance  -Cardia XT was held earlier given soft bp. will restart   - may continue with toprol   -transfer to [facility]     Chronic CHF/ stable   -will hold lasix 40 mg po qdaily hold med given soft bp  -strict I/Os, daily weights    COPD/ stable   -not on home O2  - d/c'd albuterol for now and will monitor     Elevated BUN  -likely due to hypovolemia  -stable now. Will continue to monitor     Preventive measure  -dvt ppx: holding AC due to bleed risk, vcd boots 63 yo male with pmhx of Afib on CardioXT s/p watchman on 4/2/19, CHF, and COPD presents with bright red blood per rectum admitted for likely lower GI bleed. Pt initially responded to PRBC'S. Am of 5/28 pts H/H dropped from 8.6 to 7.4. Ordered 2 more PRBC's and Hemoglobin improved to 7.8 S/P colonoscopy on 5/29 which showed internal hemmoroids and ulceration of cecum. GI states ulcers are deep and likely ischemic. Pt had soft bp and low heart rate this am so pt to be monitored for 24 more hours and ASA 81 may be restarted.     Bright red blood per rectum likely 2/2 Lower GI bleed  -s/p PRBC 4 units, plasma 1 unit, platelets 1 unit in ED  -Initially H/H responded from 8.2 to 10.9  -CT abd shows right-sided colitis/cecitis  -serial H/H q6h for now  -c/w protonix 40 mg q daily  -GI consult consulted and appreciated. who wanted to observe pt for 24 hour after feeding initially but pt had bloody BM's with H/H drop from 8.6 to 7.4 GI performed colonoscopy on 5/29 and found internal hemmoroids and ulceration of cecum.   - Cleared by GI but heart rate low this am so will observe for 24 more hours after restarting asa 81.     Right sided Colitis   -CT abd shows right sided colitis/cecitis and pt has downtrending wbc ct.   -start clear liquids, npo after midnight for possible procedure  -c/w  ciprofloxacin 400 mg iv bid  -c/w metronidazole 500 mg iv q8h  -Colonoscopy shows ulceration of cecum with possible ischemic ulcer without current bleeding.   - PRN morphine for pain q 4 for moderate to severe   - Pt to follow up with GI as outpatient     Atrial fibrillation/ with rapid HR   - Cardio consulted for GI clearance  -Cardia XT was held earlier given soft bp. will restart   - may continue with toprol   -transfer to [facility]   - cardizem 30mg q 6 due to soft bp and heart rate  -restart asa81  - monitor for 24 hours     Chronic CHF/ stable   -will hold lasix 40 mg po qdaily hold med given soft bp  -strict I/Os, daily weights    COPD/ stable   -not on home O2  - d/c'd albuterol for now and will monitor     Elevated BUN  -likely due to hypovolemia  -stable now. Will continue to monitor     Preventive measure  -dvt ppx: holding AC due to bleed risk, vcd boots 65 yo male with pmhx of Afib on CardioXT s/p watchman on 4/2/19, CHF, and COPD presents with bright red blood per rectum admitted for likely lower GI bleed. Pt initially responded to PRBC'S. Am of 5/28 pts H/H dropped from 8.6 to 7.4. Ordered 2 more PRBC's and Hemoglobin improved to 7.8 S/P colonoscopy on 5/29 which showed internal hemmoroids and ulceration of cecum. GI states ulcers are deep and likely ischemic. Pt had soft bp and low heart rate this am so pt to be monitored for 24 more hours and ASA 81 may be restarted.     Bright red blood per rectum likely 2/2 Lower GI bleed  -s/p PRBC 4 units, plasma 1 unit, platelets 1 unit in ED  -Initially H/H responded from 8.2 to 10.9  -CT abd shows right-sided colitis/cecitis  -serial H/H q6h for now  -c/w protonix 40 mg q daily  -GI consult consulted and appreciated. who wanted to observe pt for 24 hour after feeding initially but pt had bloody BM's with H/H drop from 8.6 to 7.4 GI performed colonoscopy on 5/29 and found internal hemmoroids and ulceration of cecum.   - Cleared by GI but heart rate low this am so will observe for 24 more hours after restarting asa 81.     Right sided Colitis   -CT abd shows right sided colitis/cecitis and pt has downtrending wbc ct.   -start clear liquids, npo after midnight for possible procedure  -c/w  ciprofloxacin 400 mg iv bid  -c/w metronidazole 500 mg iv q8h  -Colonoscopy shows ulceration of cecum with possible ischemic ulcer without current bleeding.   - PRN morphine for pain q 4 for moderate to severe   - Pt to follow up with GI as outpatient     Atrial fibrillation/ with rvr / hr better controlled now   - Cardio consulted for GI clearance  -Cardia XT was held earlier given soft bp. will restart   - may continue with toprol   -transfer to [facility]   - cardizem 30mg q 6 due to soft bp and heart rate  -restart asa81 as per GI/ cardio   - monitor for 24 hours     Chronic CHF/ stable   -will hold lasix 40 mg po qdaily hold med given soft bp  -strict I/Os, daily weights    COPD/ stable   -not on home O2  - d/c'd albuterol for now and will monitor     Elevated BUN  -likely due to hypovolemia  -stable now. Will continue to monitor     Preventive measure  -dvt ppx: holding AC due to bleed risk, vcd boots

## 2019-05-31 NOTE — PROGRESS NOTE ADULT - SUBJECTIVE AND OBJECTIVE BOX
Patient seen and examined; chart reviewed.  Feels well.  No further bowel movements in last day.  no overt bleeding.  Denies abdominal pain.  Still with chronic right flank pain.   HGG stable at 8.1.       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  No dyspepsia  No abdominal pain.    No Nausea, vomiting.  No Bleeding.  No hematemesis.   No diarrhea.    No hematochesia.  No weight loss, anorexia.  No edema.      MEDICATIONS  (STANDING):  ciprofloxacin   IVPB      ciprofloxacin   IVPB 400 milliGRAM(s) IV Intermittent every 12 hours  diltiazem    Tablet 60 milliGRAM(s) Oral every 6 hours  furosemide    Tablet 40 milliGRAM(s) Oral daily  lidocaine   Patch 1 Patch Transdermal daily  metoprolol succinate  milliGRAM(s) Oral daily  metroNIDAZOLE  IVPB 500 milliGRAM(s) IV Intermittent every 8 hours  metroNIDAZOLE  IVPB      pantoprazole  Injectable 40 milliGRAM(s) IV Push two times a day  saccharomyces boulardii 250 milliGRAM(s) Oral two times a day    MEDICATIONS  (PRN):  acetaminophen   Tablet .. 650 milliGRAM(s) Oral every 6 hours PRN Temp greater or equal to 38C (100.4F), Mild Pain (1 - 3)  LORazepam   Injectable 2 milliGRAM(s) IV Push every 1 hour PRN CIWA-Ar score 8 or greater  morphine  - Injectable 1 milliGRAM(s) IV Push every 4 hours PRN Moderate Pain (4 - 6)  morphine  - Injectable 2 milliGRAM(s) IV Push every 6 hours PRN Severe Pain (7 - 10)      Allergies    Duricef (Rash)    Intolerances        Vital Signs Last 24 Hrs  T(C): 36.4 (31 May 2019 05:36), Max: 36.8 (30 May 2019 21:04)  T(F): 97.5 (31 May 2019 05:36), Max: 98.3 (30 May 2019 21:04)  HR: 83 (31 May 2019 05:36) (74 - 125)  BP: 102/52 (31 May 2019 05:36) (95/60 - 144/75)  BP(mean): --  RR: 19 (31 May 2019 05:36) (18 - 19)  SpO2: 95% (31 May 2019 05:36) (95% - 97%)    PHYSICAL EXAM:    GENERAL: NAD, well-groomed, well-developed  HEAD:  Atraumatic, Normocephalic  EYES: EOMI, PERRLA, conjunctiva and sclera clear  ENMT: No tonsillar erythema, exudates, or enlargement; Moist mucous membranes, Good dentition, No lesions  NECK: Supple, No JVD, Normal thyroid  CHEST/LUNG: Clear to percussion bilaterally; No rales, rhonchi, wheezing, or rubs  HEART: Regular rate and rhythm; No murmurs, rubs, or gallops  ABDOMEN: Soft, Nontender, Nondistended; Bowel sounds present;  No HSM.  no MTR.    EXTREMITIES:  2+ Peripheral Pulses, No clubbing, cyanosis, or edema  LYMPH: No lymphadenopathy noted  SKIN: No rashes or lesions      LABS:                        8.1    4.9   )-----------( 139      ( 30 May 2019 07:34 )             24.3     05-30    136  |  102  |  9.0  ----------------------------<  101  3.9   |  26.0  |  0.75    Ca    8.5<L>      30 May 2019 07:34  Phos  3.8     05-30  Mg     1.7     05-30               RADIOLOGY & ADDITIONAL STUDIES:

## 2019-05-31 NOTE — PROGRESS NOTE ADULT - SUBJECTIVE AND OBJECTIVE BOX
CC: Patient is a 64y old  Male who presents with a chief complaint of GI bleed     Patient seen and examined at bedside, S/P colonoscopy on 5/29 which showed internal hemorrhoids and ulceration of cecum. Pts heart rate has been low and blood pressure was low and Cardizem held. Pt explained that will need to be observed for 24 more hours after restarting ASA and readjusting medications.      ROS: Denies fever, chest pain, SOB, abdominal pain, diarrhea, constipation, calf pain.    Vital Signs Last 24 Hrs    T(C): 37.1 (31 May 2019 09:46), Max: 37.1 (31 May 2019 09:46)  T(F): 98.7 (31 May 2019 09:46), Max: 98.7 (31 May 2019 09:46)  HR: 67 (31 May 2019 11:12) (65 - 97)  BP: 92/55 (31 May 2019 11:12) (92/55 - 124/58)  RR: 16 (31 May 2019 11:12) (16 - 19)  SpO2: 96% (31 May 2019 11:12) (95% - 97%)    Physical Exam:   Gen: NAD  HEENT: NCAT, EOMI, PERRLA, Pupils_  CVS: RRR, +S1/S2, no murmurs, rubs or gallops appreciated. Pain on right rib cage @ angle of 12th rib.   RESP: CTAB, no wheeze, rales, rhonchi  Abdomen: +BS, soft, ND, NT. no palpable flank tenderness or mass, no CVA tenderness. Ecchymosis on b/l lower back from previous fall   Ext: No cyanosis, edema or calf tenderness  Neuro: AAOx3, no focal deficits.    Labs:                                              8.1    4.9   )-----------( 139      ( 30 May 2019 07:34 )             24.3   05-30    136  |  102  |  9.0  ----------------------------<  101  3.9   |  26.0  |  0.75    Ca    8.5<L>      30 May 2019 07:34  Phos  3.8     05-30  Mg     1.7     05-30        Radiology:  < from: CT Angio Abdomen and Pelvis w/ IV Cont (05.26.19 @ 18:10) >  IMPRESSION:    No active GI bleeding.    Right-sided colitis/cecitis. Follow-up colonoscopy after resolution of   symptoms suggested.    < end of copied text >        Medications:  MEDICATIONS  (STANDING):  ALBUTerol/ipratropium for Nebulization 3 milliLiter(s) Nebulizer every 24 hours  ciprofloxacin   IVPB      ciprofloxacin   IVPB 400 milliGRAM(s) IV Intermittent every 12 hours  metroNIDAZOLE  IVPB 500 milliGRAM(s) IV Intermittent every 8 hours  metroNIDAZOLE  IVPB      pantoprazole  Injectable 40 milliGRAM(s) IV Push every 12 hours    MEDICATIONS  (PRN):

## 2019-05-31 NOTE — PROGRESS NOTE ADULT - ASSESSMENT
Acute ischemic colitis of the cecum.  Awaiting biopsies.  Clinically stable.  OK for DC from a GI POV, on Aspirin and Iron supplements.  GI office follow up for Path results with Dr Yanez.

## 2019-06-01 ENCOUNTER — TRANSCRIPTION ENCOUNTER (OUTPATIENT)
Age: 64
End: 2019-06-01

## 2019-06-01 VITALS
SYSTOLIC BLOOD PRESSURE: 108 MMHG | TEMPERATURE: 99 F | OXYGEN SATURATION: 96 % | HEART RATE: 82 BPM | DIASTOLIC BLOOD PRESSURE: 54 MMHG

## 2019-06-01 LAB
ANION GAP SERPL CALC-SCNC: 11 MMOL/L — SIGNIFICANT CHANGE UP (ref 5–17)
BASOPHILS # BLD AUTO: 0 K/UL — SIGNIFICANT CHANGE UP (ref 0–0.2)
BASOPHILS NFR BLD AUTO: 0.4 % — SIGNIFICANT CHANGE UP (ref 0–2)
BUN SERPL-MCNC: 7 MG/DL — LOW (ref 8–20)
CALCIUM SERPL-MCNC: 8.6 MG/DL — SIGNIFICANT CHANGE UP (ref 8.6–10.2)
CHLORIDE SERPL-SCNC: 103 MMOL/L — SIGNIFICANT CHANGE UP (ref 98–107)
CO2 SERPL-SCNC: 24 MMOL/L — SIGNIFICANT CHANGE UP (ref 22–29)
CREAT SERPL-MCNC: 0.72 MG/DL — SIGNIFICANT CHANGE UP (ref 0.5–1.3)
EOSINOPHIL # BLD AUTO: 0.2 K/UL — SIGNIFICANT CHANGE UP (ref 0–0.5)
EOSINOPHIL NFR BLD AUTO: 4.2 % — SIGNIFICANT CHANGE UP (ref 0–6)
GLUCOSE SERPL-MCNC: 102 MG/DL — SIGNIFICANT CHANGE UP (ref 70–115)
HCT VFR BLD CALC: 25.7 % — LOW (ref 42–52)
HGB BLD-MCNC: 8.2 G/DL — LOW (ref 14–18)
LYMPHOCYTES # BLD AUTO: 0.9 K/UL — LOW (ref 1–4.8)
LYMPHOCYTES # BLD AUTO: 17.9 % — LOW (ref 20–55)
MAGNESIUM SERPL-MCNC: 1.6 MG/DL — SIGNIFICANT CHANGE UP (ref 1.6–2.6)
MCHC RBC-ENTMCNC: 29.8 PG — SIGNIFICANT CHANGE UP (ref 27–31)
MCHC RBC-ENTMCNC: 31.9 G/DL — LOW (ref 32–36)
MCV RBC AUTO: 93.5 FL — SIGNIFICANT CHANGE UP (ref 80–94)
MONOCYTES # BLD AUTO: 0.6 K/UL — SIGNIFICANT CHANGE UP (ref 0–0.8)
MONOCYTES NFR BLD AUTO: 11.9 % — HIGH (ref 3–10)
NEUTROPHILS # BLD AUTO: 3.4 K/UL — SIGNIFICANT CHANGE UP (ref 1.8–8)
NEUTROPHILS NFR BLD AUTO: 65.2 % — SIGNIFICANT CHANGE UP (ref 37–73)
PHOSPHATE SERPL-MCNC: 3.4 MG/DL — SIGNIFICANT CHANGE UP (ref 2.4–4.7)
PLATELET # BLD AUTO: 127 K/UL — LOW (ref 150–400)
POTASSIUM SERPL-MCNC: 3.6 MMOL/L — SIGNIFICANT CHANGE UP (ref 3.5–5.3)
POTASSIUM SERPL-SCNC: 3.6 MMOL/L — SIGNIFICANT CHANGE UP (ref 3.5–5.3)
RBC # BLD: 2.75 M/UL — LOW (ref 4.6–6.2)
RBC # FLD: 16.5 % — HIGH (ref 11–15.6)
SODIUM SERPL-SCNC: 138 MMOL/L — SIGNIFICANT CHANGE UP (ref 135–145)
WBC # BLD: 5.2 K/UL — SIGNIFICANT CHANGE UP (ref 4.8–10.8)
WBC # FLD AUTO: 5.2 K/UL — SIGNIFICANT CHANGE UP (ref 4.8–10.8)

## 2019-06-01 PROCEDURE — 99239 HOSP IP/OBS DSCHRG MGMT >30: CPT | Mod: GC

## 2019-06-01 RX ORDER — DILTIAZEM HCL 120 MG
1 CAPSULE, EXT RELEASE 24 HR ORAL
Qty: 30 | Refills: 0
Start: 2019-06-01 | End: 2019-06-30

## 2019-06-01 RX ORDER — METRONIDAZOLE 500 MG
1 TABLET ORAL
Qty: 6 | Refills: 0
Start: 2019-06-01 | End: 2019-06-02

## 2019-06-01 RX ORDER — ASPIRIN/CALCIUM CARB/MAGNESIUM 324 MG
1 TABLET ORAL
Qty: 0 | Refills: 0 | DISCHARGE
Start: 2019-06-01

## 2019-06-01 RX ORDER — FERROUS SULFATE 325(65) MG
1 TABLET ORAL
Qty: 30 | Refills: 0
Start: 2019-06-01 | End: 2019-06-30

## 2019-06-01 RX ORDER — PANTOPRAZOLE SODIUM 20 MG/1
1 TABLET, DELAYED RELEASE ORAL
Qty: 30 | Refills: 0
Start: 2019-06-01 | End: 2019-06-30

## 2019-06-01 RX ORDER — MOXIFLOXACIN HYDROCHLORIDE TABLETS, 400 MG 400 MG/1
1 TABLET, FILM COATED ORAL
Qty: 4 | Refills: 0
Start: 2019-06-01 | End: 2019-06-02

## 2019-06-01 RX ORDER — FUROSEMIDE 40 MG
1 TABLET ORAL
Qty: 0 | Refills: 0 | DISCHARGE
Start: 2019-06-01

## 2019-06-01 RX ORDER — FERROUS SULFATE 325(65) MG
325 TABLET ORAL DAILY
Refills: 0 | Status: DISCONTINUED | OUTPATIENT
Start: 2019-06-01 | End: 2019-06-01

## 2019-06-01 RX ORDER — PANTOPRAZOLE SODIUM 20 MG/1
40 TABLET, DELAYED RELEASE ORAL
Refills: 0 | Status: DISCONTINUED | OUTPATIENT
Start: 2019-06-01 | End: 2019-06-01

## 2019-06-01 RX ORDER — METOPROLOL TARTRATE 50 MG
1 TABLET ORAL
Qty: 0 | Refills: 0 | DISCHARGE
Start: 2019-06-01

## 2019-06-01 RX ADMIN — Medication 325 MILLIGRAM(S): at 14:20

## 2019-06-01 RX ADMIN — Medication 40 MILLIGRAM(S): at 05:31

## 2019-06-01 RX ADMIN — Medication 30 MILLIGRAM(S): at 14:17

## 2019-06-01 RX ADMIN — PANTOPRAZOLE SODIUM 40 MILLIGRAM(S): 20 TABLET, DELAYED RELEASE ORAL at 05:32

## 2019-06-01 RX ADMIN — Medication 650 MILLIGRAM(S): at 06:31

## 2019-06-01 RX ADMIN — Medication 100 MILLIGRAM(S): at 05:22

## 2019-06-01 RX ADMIN — Medication 650 MILLIGRAM(S): at 05:32

## 2019-06-01 RX ADMIN — Medication 81 MILLIGRAM(S): at 14:17

## 2019-06-01 RX ADMIN — LIDOCAINE 1 PATCH: 4 CREAM TOPICAL at 14:16

## 2019-06-01 RX ADMIN — Medication 100 MILLIGRAM(S): at 05:31

## 2019-06-01 RX ADMIN — Medication 30 MILLIGRAM(S): at 05:31

## 2019-06-01 RX ADMIN — Medication 200 MILLIGRAM(S): at 06:48

## 2019-06-01 RX ADMIN — Medication 250 MILLIGRAM(S): at 05:31

## 2019-06-01 NOTE — PROGRESS NOTE ADULT - SUBJECTIVE AND OBJECTIVE BOX
STONE DIAZ  MRN: [id number]    BRIEF HOSPITAL COURSE:  Briefly this is a 63 yo male with pmhx of Afib s/p watchman on 4/2/19, CHF, and COPD a/w LGIB.    Events last 24 hours:   H/H stable, no further bleeding.    Review of Systems: negative    MEDICATIONS  (STANDING):  aspirin  chewable 81 milliGRAM(s) Oral daily  ciprofloxacin   IVPB      ciprofloxacin   IVPB 400 milliGRAM(s) IV Intermittent every 12 hours  diltiazem    Tablet 30 milliGRAM(s) Oral every 6 hours  furosemide    Tablet 40 milliGRAM(s) Oral daily  lidocaine   Patch 1 Patch Transdermal daily  metoprolol succinate  milliGRAM(s) Oral daily  metroNIDAZOLE  IVPB 500 milliGRAM(s) IV Intermittent every 8 hours  metroNIDAZOLE  IVPB      pantoprazole  Injectable 40 milliGRAM(s) IV Push two times a day  saccharomyces boulardii 250 milliGRAM(s) Oral two times a day  sodium chloride 0.9%. 1000 milliLiter(s) (75 mL/Hr) IV Continuous <Continuous>    MEDICATIONS  (PRN):  acetaminophen   Tablet .. 650 milliGRAM(s) Oral every 6 hours PRN Temp greater or equal to 38C (100.4F), Mild Pain (1 - 3)  LORazepam   Injectable 2 milliGRAM(s) IV Push every 1 hour PRN CIWA-Ar score 8 or greater  morphine  - Injectable 1 milliGRAM(s) IV Push every 4 hours PRN Moderate Pain (4 - 6)  morphine  - Injectable 2 milliGRAM(s) IV Push every 6 hours PRN Severe Pain (7 - 10)      Vital Signs Last 24 Hrs  T(C): 36.7 (01 Jun 2019 05:28), Max: 37.1 (31 May 2019 09:46)  T(F): 98 (01 Jun 2019 05:28), Max: 98.7 (31 May 2019 09:46)  HR: 78 (01 Jun 2019 05:28) (65 - 79)  BP: 118/70 (01 Jun 2019 05:28) (90/46 - 118/70)  BP(mean): --  RR: 18 (01 Jun 2019 05:28) (16 - 18)  SpO2: 98% (01 Jun 2019 05:28) (96% - 99%)    I&O's Detail    31 May 2019 07:01 - 01 Jun 2019 07:00  --------------------------------------------------------  IN:    Oral Fluid: 860 mL  Total IN: 860 mL    OUT:    Voided: 1650 mL  Total OUT: 1650 mL    Total NET: -790 mL                                8.1    4.9   )-----------( 139      ( 30 May 2019 07:34 )             24.3     05-30    136  |  102  |  9.0  ----------------------------<  101  3.9   |  26.0  |  0.75    Ca    8.5<L>      30 May 2019 07:34  Phos  3.8     05-30  Mg     1.7     05-30        Physical Examination:    General: No acute distress.    HEENT: Pupils equal, reactive to light.  Symmetric.  NECK: Supple, no lymphadenopathy, trachea midline  RESP: Clear to auscultation bilaterally, no significant sputum production  CV: Regular rate and rhythm, no murmurs, rubs, or gallops  ABD: Soft, nondistended, nontender, normoactive bowel sounds, no masses  EXT: No edema, nontender  SKIN: Warm and well perfused, no rashes noted.  NEURO: Alert, oriented, interactive, nonfocal

## 2019-06-01 NOTE — PROGRESS NOTE ADULT - PROVIDER SPECIALTY LIST ADULT
Family Medicine
Gastroenterology
Anesthesia
Electrophysiology
Family Medicine
Gastroenterology
Gastroenterology

## 2019-06-01 NOTE — CHART NOTE - NSCHARTNOTEFT_GEN_A_CORE
Asked by RN to assess LUE IV infiltrate. LUE is noted w mild swelling w induration, taut skin, no erythema/streaking/warmth. Infiltrate was normal saline. Will prescribe warm compresses for 20 minutes on q hourly X 4 hours as well as elevate the LUE on pillows. Patient is an alert and oriented gentleman. Advised patient to alert RN if no improvement or if there is any worsening. Recall PA as needed.

## 2019-06-01 NOTE — PROGRESS NOTE ADULT - ASSESSMENT
65 yo male with pmhx of Afib s/p watchman on 4/2/19, CHF, and COPD presents with bright red blood per rectum admitted for likely lower GI bleed. Pt initially responded to PRBC'S. Am of 5/28 pts H/H dropped from 8.6 to 7.4. Ordered 2 more PRBC's and Hemoglobin improved to 7.8 S/P colonoscopy on 5/29 which showed internal hemmoroids and ulceration of cecum.     Bright red blood per rectum likely 2/2 Lower GI bleed  -s/p PRBC 4 units, plasma 1 unit, platelets 1 unit in ED  -Initially H/H responded from 8.2 to 10.9  -CT abd shows right-sided colitis/cecitis  -serial H/H q6h for now  -c/w protonix 40 mg q daily  -GI consult consulted and appreciated. who wanted to observe pt for 24 hour after feeding initially but pt had bloody BM's with H/H drop from 8.6 to 7.4 GI performed colonoscopy on 5/29 and found internal hemmoroids and ulceration of cecum.   - anticipate d/c home    Right sided Colitis   -CT abd shows right sided colitis/cecitis and pt has downtrending wbc ct on abx  -c/w  ciprofloxacin 400 mg iv bid  -c/w metronidazole 500 mg iv q8h  -Colonoscopy shows ulceration of cecum with possible ischemic ulcer without current bleeding.   - Pt to follow up with GI as outpatient     Atrial fibrillation/ with rvr / hr better controlled now   - stable  - BB  - CCB    Chronic CHF/ stable   -lasix  -strict I/Os, daily weights    COPD/ stable   -not on home O2  -stable    Elevated BUN  -stable now. Will continue to monitor     Preventive measure  -dvt ppx: SCDs

## 2019-06-01 NOTE — DISCHARGE NOTE PROVIDER - NSDCCPCAREPLAN_GEN_ALL_CORE_FT
PRINCIPAL DISCHARGE DIAGNOSIS  Diagnosis: Acute ischemic colitis  Assessment and Plan of Treatment: Please continue with medications with protonic and iron supplementation as prescribed. FInish course of antibiotics and please follow up with gastroenterologist in 1 week to receive biopsy results. Avoid advil, ibuprofen/ alleve.      SECONDARY DISCHARGE DIAGNOSES  Diagnosis: Atrial fibrillation  Assessment and Plan of Treatment: Continue with home dose of metoprolol and your diltiazem was decreased. Please follow up with your cardiologist in 1 week.    Diagnosis: Hypertension  Assessment and Plan of Treatment: Your diltiazem was reduced, your lisinopril was discontinued. Please monitor your blood pressure closely.    Diagnosis: Hyperlipidemia  Assessment and Plan of Treatment: Continue with home medications.    Diagnosis: Chronic obstructive pulmonary disease (COPD)  Assessment and Plan of Treatment: Continue with home medications.    Diagnosis: Chronic diastolic heart failure  Assessment and Plan of Treatment: Continue with home medications.    Diagnosis: BPH (benign prostatic hyperplasia)  Assessment and Plan of Treatment: Continue with home medications.

## 2019-06-01 NOTE — DISCHARGE NOTE NURSING/CASE MANAGEMENT/SOCIAL WORK - NSDCDPATPORTLINK_GEN_ALL_CORE
You can access the [facility] Patient Portal, offered by [facility], by registering with the following website: [email or web address]

## 2019-06-01 NOTE — DISCHARGE NOTE PROVIDER - NSDCFUADDAPPT_GEN_ALL_CORE_FT
Please follow up with your primary care physician in 3-5 days. Please follow up with the gastroenterologist in 1 week.

## 2019-06-01 NOTE — DISCHARGE NOTE PROVIDER - HOSPITAL COURSE
65 y/o M with a PMHx of HFpEF (EF on MYA 05/20/2019 EF 60-65%), pulmonary HTN on lasix, COPD (not on home O2), Afib (s/p Watchman 04/02/19, off Xarelto), BPH, HTN, HLD, GERD, and EtOH abuse who presented to the ED with bright red blood per rectum. Noted hgb ~8 s/p 6 prbc u, 1 plt and 1 plasma. Pt evaluated by GI and recommended for colonoscopy. Which showed ulcerated cecum (almost whole cecum involved). Possibly ischemic ulcer. No active bleeding. Biopsies taken to r/o adenocarcinoma. Also noted with internal hemorrhoids. Pt was empirically on cipro/ flagyl and will c/w 2 more days. Pt h/h remained stable post procedure currently with hgb ~ 8 and no further active episodes of bleeding. Pt with advancement of diet without any complications.  Pt cleared by GI for dispo, pt medically stable to be discharged home.             Dishcarge planning took 47 minutes

## 2019-06-01 NOTE — DISCHARGE NOTE NURSING/CASE MANAGEMENT/SOCIAL WORK - NSDCPEWEB_GEN_ALL_CORE
[place] website --- [email or web address] for Tobacco Control website --- [email or web address]

## 2019-06-01 NOTE — DISCHARGE NOTE NURSING/CASE MANAGEMENT/SOCIAL WORK - NSDCPEEMAIL_GEN_ALL_CORE
[facility] for Tobacco Control email [email or web address]

## 2019-06-01 NOTE — PROGRESS NOTE ADULT - REASON FOR ADMISSION
GI bleed
GI bleed;  Ischemic colitis of the Cecum.
GI bleed
GI bleed; ischemic colitis
GI bleed; hematochezia.

## 2019-06-01 NOTE — DISCHARGE NOTE NURSING/CASE MANAGEMENT/SOCIAL WORK - NSDCPEPT PROEDHF_GEN_ALL_CORE
Call primary care provider for follow up after discharge/Report signs and symptoms to primary care provider/Low salt diet/Monitor weight daily/Activities as tolerated

## 2019-06-06 ENCOUNTER — RX RENEWAL (OUTPATIENT)
Age: 64
End: 2019-06-06

## 2019-06-06 RX ORDER — DILTIAZEM HCL 120 MG
1 CAPSULE, EXT RELEASE 24 HR ORAL
Qty: 0 | Refills: 0 | DISCHARGE

## 2019-06-06 RX ORDER — PANTOPRAZOLE SODIUM 20 MG/1
1 TABLET, DELAYED RELEASE ORAL
Qty: 0 | Refills: 0 | DISCHARGE

## 2019-06-06 RX ORDER — ASPIRIN/CALCIUM CARB/MAGNESIUM 324 MG
1 TABLET ORAL
Qty: 0 | Refills: 0 | DISCHARGE

## 2019-06-06 RX ORDER — RIVAROXABAN 15 MG-20MG
1 KIT ORAL
Qty: 0 | Refills: 0 | DISCHARGE

## 2019-06-06 RX ORDER — LISINOPRIL 2.5 MG/1
1 TABLET ORAL
Qty: 0 | Refills: 0 | DISCHARGE

## 2019-06-06 RX ORDER — METOPROLOL TARTRATE 50 MG
1 TABLET ORAL
Qty: 0 | Refills: 0 | DISCHARGE

## 2019-06-06 RX ORDER — FUROSEMIDE 40 MG
1 TABLET ORAL
Qty: 0 | Refills: 0 | DISCHARGE

## 2019-06-07 PROBLEM — Z95.818 PRESENCE OF OTHER CARDIAC IMPLANTS AND GRAFTS: Chronic | Status: ACTIVE | Noted: 2019-05-20

## 2019-06-10 ENCOUNTER — NON-APPOINTMENT (OUTPATIENT)
Age: 64
End: 2019-06-10

## 2019-06-10 ENCOUNTER — APPOINTMENT (OUTPATIENT)
Dept: CARDIOLOGY | Facility: CLINIC | Age: 64
End: 2019-06-10
Payer: MEDICARE

## 2019-06-10 ENCOUNTER — INPATIENT (INPATIENT)
Facility: HOSPITAL | Age: 64
LOS: 6 days | Discharge: HOME HEALTH | End: 2019-06-17
Attending: FAMILY MEDICINE | Admitting: FAMILY MEDICINE
Payer: MEDICARE

## 2019-06-10 ENCOUNTER — APPOINTMENT (OUTPATIENT)
Dept: FAMILY MEDICINE | Facility: CLINIC | Age: 64
End: 2019-06-10
Payer: MEDICARE

## 2019-06-10 VITALS
SYSTOLIC BLOOD PRESSURE: 102 MMHG | DIASTOLIC BLOOD PRESSURE: 60 MMHG | HEIGHT: 72 IN | BODY MASS INDEX: 33.46 KG/M2 | WEIGHT: 247 LBS | OXYGEN SATURATION: 97 % | HEART RATE: 48 BPM

## 2019-06-10 VITALS
HEIGHT: 72 IN | BODY MASS INDEX: 33.46 KG/M2 | WEIGHT: 247 LBS | DIASTOLIC BLOOD PRESSURE: 48 MMHG | SYSTOLIC BLOOD PRESSURE: 87 MMHG | HEART RATE: 64 BPM | OXYGEN SATURATION: 97 %

## 2019-06-10 VITALS — WEIGHT: 246.92 LBS | HEIGHT: 72 IN

## 2019-06-10 DIAGNOSIS — R00.1 BRADYCARDIA, UNSPECIFIED: ICD-10-CM

## 2019-06-10 DIAGNOSIS — Z95.818 PRESENCE OF OTHER CARDIAC IMPLANTS AND GRAFTS: Chronic | ICD-10-CM

## 2019-06-10 DIAGNOSIS — R55 SYNCOPE AND COLLAPSE: ICD-10-CM

## 2019-06-10 DIAGNOSIS — I95.9 HYPOTENSION, UNSPECIFIED: ICD-10-CM

## 2019-06-10 DIAGNOSIS — Z89.512 ACQUIRED ABSENCE OF LEFT LEG BELOW KNEE: Chronic | ICD-10-CM

## 2019-06-10 DIAGNOSIS — S88.119A COMPLETE TRAUMATIC AMPUTATION AT LEVEL BETWEEN KNEE AND ANKLE, UNSPECIFIED LOWER LEG, INITIAL ENCOUNTER: Chronic | ICD-10-CM

## 2019-06-10 PROBLEM — J44.9 CHRONIC OBSTRUCTIVE PULMONARY DISEASE, UNSPECIFIED: Chronic | Status: ACTIVE | Noted: 2019-05-26

## 2019-06-10 PROBLEM — I50.9 HEART FAILURE, UNSPECIFIED: Chronic | Status: ACTIVE | Noted: 2019-05-26

## 2019-06-10 PROBLEM — I48.91 UNSPECIFIED ATRIAL FIBRILLATION: Chronic | Status: ACTIVE | Noted: 2019-05-26

## 2019-06-10 LAB
ADD ON TEST-SPECIMEN IN LAB: SIGNIFICANT CHANGE UP
ALBUMIN SERPL ELPH-MCNC: 2.7 G/DL — LOW (ref 3.3–5)
ALP SERPL-CCNC: 102 U/L — SIGNIFICANT CHANGE UP (ref 40–120)
ALT FLD-CCNC: 21 U/L — SIGNIFICANT CHANGE UP (ref 12–78)
ANION GAP SERPL CALC-SCNC: 7 MMOL/L — SIGNIFICANT CHANGE UP (ref 5–17)
APTT BLD: 26.2 SEC — LOW (ref 27.5–36.3)
AST SERPL-CCNC: 22 U/L — SIGNIFICANT CHANGE UP (ref 15–37)
BILIRUB SERPL-MCNC: 0.5 MG/DL — SIGNIFICANT CHANGE UP (ref 0.2–1.2)
BLD GP AB SCN SERPL QL: SIGNIFICANT CHANGE UP
BUN SERPL-MCNC: 17 MG/DL — SIGNIFICANT CHANGE UP (ref 7–23)
CALCIUM SERPL-MCNC: 8.7 MG/DL — SIGNIFICANT CHANGE UP (ref 8.5–10.1)
CHLORIDE SERPL-SCNC: 105 MMOL/L — SIGNIFICANT CHANGE UP (ref 96–108)
CO2 SERPL-SCNC: 25 MMOL/L — SIGNIFICANT CHANGE UP (ref 22–31)
CREAT SERPL-MCNC: 1.33 MG/DL — HIGH (ref 0.5–1.3)
GLUCOSE SERPL-MCNC: 149 MG/DL — HIGH (ref 70–99)
HCT VFR BLD CALC: 23.1 % — LOW (ref 39–50)
HGB BLD-MCNC: 7.2 G/DL — LOW (ref 13–17)
INR BLD: 1.17 RATIO — HIGH (ref 0.88–1.16)
MAGNESIUM SERPL-MCNC: 1.6 MG/DL — SIGNIFICANT CHANGE UP (ref 1.6–2.6)
MCHC RBC-ENTMCNC: 30.9 PG — SIGNIFICANT CHANGE UP (ref 27–34)
MCHC RBC-ENTMCNC: 31.2 GM/DL — LOW (ref 32–36)
MCV RBC AUTO: 99.1 FL — SIGNIFICANT CHANGE UP (ref 80–100)
PLATELET # BLD AUTO: 210 K/UL — SIGNIFICANT CHANGE UP (ref 150–400)
POTASSIUM SERPL-MCNC: 4.5 MMOL/L — SIGNIFICANT CHANGE UP (ref 3.5–5.3)
POTASSIUM SERPL-SCNC: 4.5 MMOL/L — SIGNIFICANT CHANGE UP (ref 3.5–5.3)
PROT SERPL-MCNC: 6.2 GM/DL — SIGNIFICANT CHANGE UP (ref 6–8.3)
PROTHROM AB SERPL-ACNC: 13.1 SEC — HIGH (ref 10–12.9)
RBC # BLD: 2.33 M/UL — LOW (ref 4.2–5.8)
RBC # FLD: 18 % — HIGH (ref 10.3–14.5)
SODIUM SERPL-SCNC: 137 MMOL/L — SIGNIFICANT CHANGE UP (ref 135–145)
TROPONIN I SERPL-MCNC: <0.015 NG/ML — SIGNIFICANT CHANGE UP (ref 0.01–0.04)
TYPE + AB SCN PNL BLD: SIGNIFICANT CHANGE UP
WBC # BLD: 7.5 K/UL — SIGNIFICANT CHANGE UP (ref 3.8–10.5)
WBC # FLD AUTO: 7.5 K/UL — SIGNIFICANT CHANGE UP (ref 3.8–10.5)

## 2019-06-10 PROCEDURE — 93000 ELECTROCARDIOGRAM COMPLETE: CPT

## 2019-06-10 PROCEDURE — 99291 CRITICAL CARE FIRST HOUR: CPT

## 2019-06-10 PROCEDURE — 93010 ELECTROCARDIOGRAM REPORT: CPT | Mod: 76

## 2019-06-10 PROCEDURE — 99214 OFFICE O/P EST MOD 30 MIN: CPT | Mod: 25

## 2019-06-10 PROCEDURE — 71045 X-RAY EXAM CHEST 1 VIEW: CPT | Mod: 26

## 2019-06-10 PROCEDURE — 86077 PHYS BLOOD BANK SERV XMATCH: CPT

## 2019-06-10 PROCEDURE — 99496 TRANSJ CARE MGMT HIGH F2F 7D: CPT

## 2019-06-10 PROCEDURE — 93306 TTE W/DOPPLER COMPLETE: CPT | Mod: 26

## 2019-06-10 RX ORDER — TIOTROPIUM BROMIDE 18 UG/1
1 CAPSULE ORAL; RESPIRATORY (INHALATION)
Qty: 0 | Refills: 0 | DISCHARGE

## 2019-06-10 RX ORDER — PHENYLEPHRINE HYDROCHLORIDE 10 MG/ML
0.1 INJECTION INTRAVENOUS
Qty: 40 | Refills: 0 | Status: DISCONTINUED | OUTPATIENT
Start: 2019-06-10 | End: 2019-06-11

## 2019-06-10 RX ORDER — ATROPINE SULFATE 0.1 MG/ML
1 SYRINGE (ML) INJECTION ONCE
Refills: 0 | Status: COMPLETED | OUTPATIENT
Start: 2019-06-10 | End: 2019-06-10

## 2019-06-10 RX ORDER — ACETAMINOPHEN 500 MG
1000 TABLET ORAL ONCE
Refills: 0 | Status: COMPLETED | OUTPATIENT
Start: 2019-06-10 | End: 2019-06-10

## 2019-06-10 RX ORDER — CLOPIDOGREL BISULFATE 75 MG/1
75 TABLET, FILM COATED ORAL DAILY
Refills: 0 | Status: DISCONTINUED | OUTPATIENT
Start: 2019-06-10 | End: 2019-06-17

## 2019-06-10 RX ORDER — ASPIRIN/CALCIUM CARB/MAGNESIUM 324 MG
81 TABLET ORAL DAILY
Refills: 0 | Status: DISCONTINUED | OUTPATIENT
Start: 2019-06-10 | End: 2019-06-17

## 2019-06-10 RX ORDER — TAMSULOSIN HYDROCHLORIDE 0.4 MG/1
1 CAPSULE ORAL
Qty: 0 | Refills: 0 | DISCHARGE

## 2019-06-10 RX ORDER — TRAZODONE HCL 50 MG
1 TABLET ORAL
Qty: 0 | Refills: 0 | DISCHARGE

## 2019-06-10 RX ORDER — DOPAMINE HYDROCHLORIDE 40 MG/ML
3 INJECTION, SOLUTION, CONCENTRATE INTRAVENOUS
Qty: 400 | Refills: 0 | Status: DISCONTINUED | OUTPATIENT
Start: 2019-06-10 | End: 2019-06-12

## 2019-06-10 RX ORDER — CHLORHEXIDINE GLUCONATE 213 G/1000ML
1 SOLUTION TOPICAL
Refills: 0 | Status: DISCONTINUED | OUTPATIENT
Start: 2019-06-10 | End: 2019-06-11

## 2019-06-10 RX ORDER — DOPAMINE HYDROCHLORIDE 40 MG/ML
3 INJECTION, SOLUTION, CONCENTRATE INTRAVENOUS
Qty: 400 | Refills: 0 | Status: DISCONTINUED | OUTPATIENT
Start: 2019-06-10 | End: 2019-06-10

## 2019-06-10 RX ORDER — FOLIC ACID 0.8 MG
1 TABLET ORAL
Qty: 0 | Refills: 0 | DISCHARGE

## 2019-06-10 RX ORDER — GLUCAGON INJECTION, SOLUTION 0.5 MG/.1ML
10 INJECTION, SOLUTION SUBCUTANEOUS ONCE
Refills: 0 | Status: COMPLETED | OUTPATIENT
Start: 2019-06-10 | End: 2019-06-10

## 2019-06-10 RX ORDER — ONDANSETRON 8 MG/1
4 TABLET, FILM COATED ORAL ONCE
Refills: 0 | Status: COMPLETED | OUTPATIENT
Start: 2019-06-10 | End: 2019-06-10

## 2019-06-10 RX ORDER — HEPARIN SODIUM 5000 [USP'U]/ML
5000 INJECTION INTRAVENOUS; SUBCUTANEOUS EVERY 12 HOURS
Refills: 0 | Status: DISCONTINUED | OUTPATIENT
Start: 2019-06-10 | End: 2019-06-11

## 2019-06-10 RX ORDER — DILTIAZEM HYDROCHLORIDE 240 MG/1
240 CAPSULE, EXTENDED RELEASE ORAL
Refills: 0 | Status: DISCONTINUED | COMMUNITY
End: 2019-06-10

## 2019-06-10 RX ORDER — CALCIUM GLUCONATE 100 MG/ML
1 VIAL (ML) INTRAVENOUS ONCE
Refills: 0 | Status: COMPLETED | OUTPATIENT
Start: 2019-06-10 | End: 2019-06-10

## 2019-06-10 RX ORDER — GABAPENTIN 400 MG/1
1 CAPSULE ORAL
Qty: 0 | Refills: 0 | DISCHARGE

## 2019-06-10 RX ORDER — METOPROLOL TARTRATE 50 MG
1 TABLET ORAL
Qty: 0 | Refills: 0 | DISCHARGE

## 2019-06-10 RX ADMIN — Medication 1 MILLIGRAM(S): at 19:36

## 2019-06-10 RX ADMIN — Medication 1000 MILLIGRAM(S): at 21:25

## 2019-06-10 RX ADMIN — Medication 1 GRAM(S): at 21:40

## 2019-06-10 RX ADMIN — GLUCAGON INJECTION, SOLUTION 10 MILLIGRAM(S): 0.5 INJECTION, SOLUTION SUBCUTANEOUS at 21:02

## 2019-06-10 RX ADMIN — Medication 1 MILLIGRAM(S): at 18:45

## 2019-06-10 RX ADMIN — DOPAMINE HYDROCHLORIDE 12.6 MICROGRAM(S)/KG/MIN: 40 INJECTION, SOLUTION, CONCENTRATE INTRAVENOUS at 21:20

## 2019-06-10 RX ADMIN — ONDANSETRON 4 MILLIGRAM(S): 8 TABLET, FILM COATED ORAL at 21:05

## 2019-06-10 RX ADMIN — Medication 400 MILLIGRAM(S): at 21:11

## 2019-06-10 RX ADMIN — Medication 200 GRAM(S): at 21:08

## 2019-06-10 RX ADMIN — Medication 1 MILLIGRAM(S): at 21:46

## 2019-06-10 NOTE — ED PROVIDER NOTE - OBJECTIVE STATEMENT
63 y/o male with a PMHx of EtOH abuse, A-Fib, anemia, CHF, COPD, GERD, HTN, s/p left BKA presents to the ED s/p syncopal episode. +mild abd pain. Pt was recently discharged from [place] after having GI bleed on 5/26/19, last hemoglobin was 8.2 on 6/1. Denies recent melena, hematochezia, CP. 6 units prbc 1 plat 1plas ulcerateed cecum wo active blerding internal hemorrhoids cipro flagyl last hgb 8.2. june 1. 63 y/o male with a PMHx of EtOH abuse, A-Fib, anemia, CHF, COPD, GERD, HTN, s/p left BKA presents to the ED s/p near syncopal episode today in Cardiologist office. +lightheadedness +mild abd pain. Pt noted to be pale by family at bedside. Pt was recently discharged from [place] after having GI bleed on 5/26/19, last hemoglobin was 8.2 on 6/1. Denies recent melena, hematochezia, CP. BM's have been normal. While in [place], pt received 6 units PRBC's, 1 unit platelets and 1 unit plasma. Pt had colonoscopy while admitted showing +internal hemorrhoids, and a friable ulcer in the cecum. Pt noted to have low heart rate on 5/31, pt was monitored for another day, remained stable and was discharged. Pt had Watchman procedure in March 2019 by EP- Dr. Clark. Cardio- Dr. Vasquez. 63 y/o male with a PMHx of EtOH abuse, A-Fib, anemia, CHF, COPD, GERD, HTN, s/p left BKA presents to the ED s/p near syncopal episode today in Cardiologist office. +lightheadedness +mild abd pain. Pt noted to be pale by family at bedside. Pt was recently discharged from [place] after having GI bleed on 5/26/19, last hemoglobin was 8.2 on 6/1. Denies recent melena, hematochezia, CP. BM's have been normal. While in [place], pt received 6 units PRBC's, 1 unit platelets and 1 unit plasma. Pt had colonoscopy while admitted showing +internal hemorrhoids, and a friable ulcer in the cecum. Pt noted to have low heart rate on 5/31, pt was monitored for another day, remained stable and was discharged. Pt had Watchman procedure in March 2019 by EP- Dr. Clark. Cardio- Dr. Daley. PMD- Dr. Vasquez.

## 2019-06-10 NOTE — H&P ADULT - NSHPLABSRESULTS_GEN_ALL_CORE
Labs:                        7.2    7.50  )-----------( 210      ( 10 Jackson 2019 18:50 )             23.1     06-10    137  |  105  |  17  ----------------------------<  149<H>  4.5   |  25  |  1.33<H>    Ca    8.7      10 Jackson 2019 18:50  Mg     1.6     06-10    TPro  6.2  /  Alb  2.7<L>  /  TBili  0.5  /  DBili  x   /  AST  22  /  ALT  21  /  AlkPhos  102  06-10    PT/INR - ( 10 Jackson 2019 18:50 )   PT: 13.1 sec;   INR: 1.17 ratio         PTT - ( 10 Jackson 2019 18:50 )  PTT:26.2 sec

## 2019-06-10 NOTE — END OF VISIT
[FreeTextEntry3] : Medical record entries made by the scribe today today, were at my direction and personally dictated to them by me, Dr. Augusta Vasquez on Jackson 10, 2019. I have reviewed the chart and agree that the record accurately reflects my personal performance of the history, physical exam, assessment, and plan.\par \par

## 2019-06-10 NOTE — PHYSICAL EXAM
[General Appearance - Well Developed] : well developed [Normal Appearance] : normal appearance [General Appearance - Well Nourished] : well nourished [General Appearance - In No Acute Distress] : no acute distress [Normal Conjunctiva] : the conjunctiva exhibited no abnormalities [Eyelids - No Xanthelasma] : the eyelids demonstrated no xanthelasmas [No Oral Pallor] : no oral pallor [Normal Oral Mucosa] : normal oral mucosa [JVD Elevated _____cm] : JVD elevated [unfilled] ~U cm above clavicle [No Oral Cyanosis] : no oral cyanosis [No Jugular Venous [place] A Waves] : no jugular venous [place] A waves [Normal Jugular Venous A Waves Present] : normal jugular venous A waves present [Respiration, Rhythm And Depth] : normal respiratory rhythm and effort [Exaggerated Use Of Accessory Muscles For Inspiration] : no accessory muscle use [Murmurs] : no murmurs present [Heart Sounds] : normal S1 and S2 [Abdomen Soft] : soft [Abdomen Tenderness] : non-tender [Abdomen Mass (___ Cm)] : no abdominal mass palpated [Nail Clubbing] : no clubbing of the fingernails [Petechial Hemorrhages (___cm)] : no petechial hemorrhages [Cyanosis, Localized] : no localized cyanosis [] : no ischemic changes [Oriented To Time, Place, And Person] : oriented to person, place, and time [FreeTextEntry1] : s/p left BKA with LE prosthesis [Impaired Insight] : insight and judgment were intact

## 2019-06-10 NOTE — ED PROVIDER NOTE - PSH
Presence of Watchman left atrial appendage closure device    S/P BKA (below knee amputation) unilateral, left    Unilateral amputation of lower extremity below knee

## 2019-06-10 NOTE — H&P ADULT - NSICDXPASTSURGICALHX_GEN_ALL_CORE_FT
PAST SURGICAL HISTORY:  Presence of Watchman left atrial appendage closure device     S/P BKA (below knee amputation) unilateral, left     Unilateral amputation of lower extremity below knee

## 2019-06-10 NOTE — ADDENDUM
[FreeTextEntry1] : I, Mary Carmona acting as a scribe for Dr. Augusta Vasquez on Jackson 10, 2019  at 5:09 PM\par

## 2019-06-10 NOTE — ED PROVIDER NOTE - SKIN, MLM
Skin normal color for race, warm, dry and intact. No evidence of rash. Skin warm, dry and intact. No evidence of rash. +pale

## 2019-06-10 NOTE — H&P ADULT - NSICDXFAMILYHX_GEN_ALL_CORE_FT
FAMILY HISTORY:  Family history unknown, Adopted  No pertinent family history in first degree relatives

## 2019-06-10 NOTE — HISTORY OF PRESENT ILLNESS
[Post-hospitalization from ___ Hospital] : Post-hospitalization from [unfilled] Hospital [Admitted on: ___] : The patient was admitted on [unfilled] [Discharged on ___] : discharged on [unfilled] [FreeTextEntry2] : STONE is a 64 year male previously admitted to [facility] [facility] on 5/27 due to a GI bleed. Patient underwent colonoscopy, endoscopy and MRI. Due to excessive blood loss, he underwent blood transfusions. Patient was discharged 6/1/19 with a hgb of 5.2. Recent adjustments of BP medication due to low BP. Was told to follow up with a GI doctor. He presents pallor and with SOB, BP of 102/60, HR 48. \par

## 2019-06-10 NOTE — REVIEW OF SYSTEMS
[Fainting] : fainting [Shortness Of Breath] : shortness of breath [Negative] : Heme/Lymph [FreeTextEntry2] : Pallor

## 2019-06-10 NOTE — ED ADULT TRIAGE NOTE - CHIEF COMPLAINT QUOTE
Patient sent from [place] for syncopal episodes, lightheadedness, and pale.  Recently discharged from [facility] for GI bleed.

## 2019-06-10 NOTE — ED ADULT NURSE NOTE - NSIMPLEMENTINTERV_GEN_ALL_ED
Implemented All Fall with Harm Risk Interventions:  [place] to call system. Call bell, personal items and telephone within reach. Instruct patient to call for assistance. Room bathroom lighting operational. Non-slip footwear when patient is off stretcher. Physically safe environment: no spills, clutter or unnecessary equipment. Stretcher in lowest position, wheels locked, appropriate side rails in place. Provide visual cue, wrist band, yellow gown, etc. Monitor gait and stability. Monitor for mental status changes and reorient to person, place, and time. Review medications for side effects contributing to fall risk. Reinforce activity limits and safety measures with patient and family. Provide visual clues: red socks.

## 2019-06-10 NOTE — PLAN
[FreeTextEntry1] : Patient presents near syncope likely due to anemia, low bp and heart rate. Advised patient to be evaluated in the ER for further workup and medication adjustment.

## 2019-06-10 NOTE — PATIENT PROFILE ADULT - FUNCTIONAL SCREEN CURRENT LEVEL: SWALLOWING (IF SCORE 2 OR MORE FOR ANY ITEM, CONSULT REHAB SERVICES), MLM)
PT NPO at present.  Pt's daughter states that pt has no difficulty chewing or swallowing"./0 = swallows foods/liquids without difficulty

## 2019-06-10 NOTE — ED ADULT NURSE NOTE - OBJECTIVE STATEMENT
pt is a 65 y/o male w/ pmhx of afib with watchman device, CHF, COPD and GI bleed  w/ blood transfusion x6 approx 2 weeks ago, presenting to ED for eval of weakness, near syncope, diaphoresis and dizziness. pt denies CP, palpitations, notes normal GI output and no coffee ground stool. pt pale upon arrival.

## 2019-06-10 NOTE — ED PROVIDER NOTE - PROGRESS NOTE DETAILS
Scribe CD for ED attending, Doctor Estrella. 1mg atropine given with improvement to 60-70s heart rate. EP paged. Repeat blood pressure was hypotensive to 70s. IV line established. Scribe CD for ED attending, Doctor Estrella. Pt given another 1mg atropine because became bradycardic again. Will start dopamine drip. Still pending EP consult, Pt is on pacer pads but not currently paced. Scribe CD for ED attending, Doctor Sameer. Rectal exam- brown stool. Guaiac negative. QC reactive. Lot 147. Expiration 12/31/19. Scribe CD for ED attending, Doctor Sameer. Spoke with EP. Heart rate is normal at this time- doesn't think hypotension is due to HR now that HR is corrected. Agrees with dopamine drip. Recommends volume. Does not think pt is a candidate for temporary pacer at this time. Scribe CD for ED attending, Doctor Sameer. Spoke with EP Dr. Wetzel. Heart rate is normal at this time- doesn't think hypotension is due to HR now that HR is corrected. Agrees with dopamine drip. Recommends volume. Does not think pt is a candidate for temporary pacer at this time. Isrrael CD for ED attending, Doctor Sameer. Spoke with ICU- Dr. Ulloa coming down to see pt. Scribe CD for ED attending, Doctor Sameer. Spoke with Dr. Hein- Cardiology. States to hold Cardizem and metoprolol. Agrees with ICU consult. Aware of echo being performed. Scribe CD for ED attending, Doctor Estrella. ICU at bedside. Scribe CD for ED attending, Doctor Sameer. ICU Dr. Bae agrees with calcium gluconate. Continue with blood and dopamine. Likely CCU admit. Scribe CD for ED attending, Doctor Sameer. Spoke with Dr. Hein- echo normal. Mildly dilated left atrium. Scribe CD for ED attending, Doctor Sameer. Dr. Bae accepted pt to CCU. Requests that we hold off on CT angio to spare kidneys. Scribe CD for ED attending, Doctor Sameer. Nurse concerned about possible withdrawal sx. Will do formal CIWA and let ICU know. Scribe CD for ED attending, Doctor Sameer. Dr. Bae accepted pt to CCU. Requests that we hold off on CT angio to spare worsening kidney funciton.

## 2019-06-10 NOTE — ED PROCEDURE NOTE - CPROC ED ARTER LINE DETAIL1
Line was sutured in place./Using sterile technique, the correct location was identified, and a needle was inserted into the artery (specify in FT)./Positive blood return was obtained via the catheter.

## 2019-06-10 NOTE — REVIEW OF SYSTEMS
[see HPI] : see HPI [Dyspnea on exertion] : dyspnea during exertion [Negative] : Heme/Lymph [Fever] : no fever [Recent Weight Gain (___ Lbs)] : no recent weight gain [Chills] : no chills [Feeling Fatigued] : not feeling fatigued [Shortness Of Breath] : no shortness of breath [Chest Pain] : no chest pain [Palpitations] : no palpitations [Lower Ext Edema] : no extremity edema [Dizziness] : no dizziness [Convulsions] : no convulsions

## 2019-06-10 NOTE — H&P ADULT - HISTORY OF PRESENT ILLNESS
Mr. Boone is a 64 year old male with PMHx of EtOH abuse, A-Fib s/p watchman procedure on 4/2/19, anemia, CHF, COPD, GERD, cirrhosis, alcohol abuse, HTN, s/p left BKA presented to the ED on 6/10/19 s/p near syncopal episode in his cardiologists office. He had lightheadness and mild abd pain. He was recently discharged from [place] after having a GI bleed on 5/26/19; his last hemoglobin was 8.2 on 6/1. He denies recent melena, hematochezia, and CP. While at [place], pt received 6 units PRBC's, 1 unit platelets and 1 unit plasma, and he had a colonoscopy while admitted showing internal hemorrhoids and a friable ulcer in the cecum. During that hospitalization, he was also noted to have low heart rate on 5/31, so he was monitored for another day, remained stable and was discharged.    In the ED, he was noted to be bradycardic and hypotensive  He received atropine x 2, calcium gluconate 1 g, glucagon 10 mg, dopamine, 2 units RBC and IV lorazepam for anxiety/EtOH  In the ED, patient received right sided fem A line. Pulled this out while in the CCU  Per daughter patient can have a bottle of vodka when drinking. His last drink was yesterday

## 2019-06-10 NOTE — ED PROVIDER NOTE - PMH
Alcohol abuse    Alcohol withdrawal    Anemia    Atrial fibrillation    CHF (congestive heart failure)    Chronic atrial fibrillation    Chronic CHF    Chronic obstructive pulmonary disease (COPD)    Cirrhosis    Collapsed lung    COPD without exacerbation    Emphysema of lung    Falls    GERD (gastroesophageal reflux disease)    Hypertension    Meningitis    Poor historian    Presence of Watchman left atrial appendage closure device

## 2019-06-10 NOTE — ED ADULT NURSE REASSESSMENT NOTE - NS ED NURSE REASSESS COMMENT FT1
Pt transferred to CCU with myself and Nora from CCU. En route pt pulled out right femoral A line. Pressure immediately applied to site. Pressure dressing applied on arrival to CCU.

## 2019-06-10 NOTE — H&P ADULT - NSICDXPASTMEDICALHX_GEN_ALL_CORE_FT
PAST MEDICAL HISTORY:  Alcohol abuse     Alcohol withdrawal     Anemia     Atrial fibrillation     CHF (congestive heart failure)     Chronic atrial fibrillation     Chronic CHF     Chronic obstructive pulmonary disease (COPD)     Cirrhosis     Collapsed lung     COPD without exacerbation     Emphysema of lung     Falls     GERD (gastroesophageal reflux disease)     Hypertension     Meningitis     Poor historian     Presence of Watchman left atrial appendage closure device

## 2019-06-10 NOTE — ED PROVIDER NOTE - CLINICAL SUMMARY MEDICAL DECISION MAKING FREE TEXT BOX
Pt with afib, slow response to 30's-40's, and hypotensive on arrival to 60-70's systolic.  Noted to be on Metoprolol and Cardizem.  Given total 2 mg atropine with improvement in HR to 50-60's, however remained hypotensive.  Pt anemic to Hgb 7, however no active bleeding on exam, given 2U PRBC.  Pt was started on dopamine drip, which was titrated up with improvement in BP (a-line placed for more accurate BP reads), and stable HR in 50-70's range.  Pt also noted to have ANUSHKA, which could be causing his cardiac meds to be lingering around longer, cause hypotension.  Glucagon and calcium gluconate given.  I discussed findings with Dr. Peters, stated pt not a temporary pacer candidate, likely cardiac meds, reccommendations to hold, and continue resuscitation as already planned.  Discussed with Dr. Hein who reviewed a stat Echo on the patient, which was normal.  Discussed with Dr. Munoz, who saw patient in ED, reviewed EKG and labs, and admitted to CCU.  Prior to going up to CCU, pt noted to have tremor, reported daily drinking, and was scored CIWA 7.  Admitted to CCU in stable condition, BP improved  systolic, with HR in 50-70's.

## 2019-06-10 NOTE — DISCUSSION/SUMMARY
[FreeTextEntry1] : 63 year old gentleman with history of persistent atrial fibrillation on rate control now s/p Watchman device, HTN, alcoholism, recurrent falls s/p BKA, CHF with preserved LV function and pulmonary hypertension previously.  Recent hospitalization for GIB - now off AC\par -He has prior stroke noted on MRI and high risk of AF related thromboembolism (CHADSVASc =4). Although AC would be ideal, he is a poor candidate given his alcohol abuse and multiple falls and is now s/p Watchman\par - c/w ASA\par -continue rate control for AF with Toprol 100 and diltiazem \par - BP is low will decrease diltiazem - monitor HR and BP\par -TTE 10/29/18 shows low-normal LV function, mild MS, mod-sev MR, sev LAE, sev TR, sev pHTN. \par - Nuclear stress test 12/2018 showed normal myocardial perfusion\par -f/u with me in 1 mo \par -The described plan was discussed with the pt and daughter.  All questions and concerns were addressed to the best of my knowledge. \par

## 2019-06-10 NOTE — ED ADULT NURSE NOTE - CHIEF COMPLAINT QUOTE
Patient sent from [place] for syncopal episodes, lightheadedness, and pale.  Recently discharged from [facility] for GI bleed.

## 2019-06-10 NOTE — ED ADULT TRIAGE NOTE - NS ED NURSE DIRECT TO ROOM YN
CC:  Alexys Guzman is here today for lower back left sided pain since Saturday 02/03/18. He was shoveling. Did not try any OTC meds yet.    Medications: medications verified and updated  Refills needed today?  NO  denies Latex allergy or sensitivity  Tobacco history: verified  Health Maintenance Due   Topic Date Due   • Colorectal Cancer Screening-Colonoscopy  01/11/2018   • Medicare Wellness 65+  05/09/2018     Patient is due for topics as listed above, he wishes to defer to PCP.   Yes

## 2019-06-10 NOTE — PHYSICAL EXAM
[Well Nourished] : well nourished [Well Developed] : well developed [Normal Sclera/Conjunctiva] : normal sclera/conjunctiva [PERRL] : pupils equal round and reactive to light [Normal Outer Ear/Nose] : the outer ears and nose were normal in appearance [EOMI] : extraocular movements intact [Normal Oropharynx] : the oropharynx was normal [Supple] : supple [No JVD] : no jugular venous distention [No Lymphadenopathy] : no lymphadenopathy [Thyroid Normal, No Nodules] : the thyroid was normal and there were no nodules present [No Respiratory Distress] : no respiratory distress  [Clear to Auscultation] : lungs were clear to auscultation bilaterally [No Accessory Muscle Use] : no accessory muscle use [Regular Rhythm] : with a regular rhythm [No Carotid Bruits] : no carotid bruits [No Murmur] : no murmur heard [Normal S1, S2] : normal S1 and S2 [No Abdominal Bruit] : a ~M bruit was not heard ~T in the abdomen [No Varicosities] : no varicosities [Pedal Pulses Present] : the pedal pulses are present [No Edema] : there was no peripheral edema [No Palpable Aorta] : no palpable aorta [No Extremity Clubbing/Cyanosis] : no extremity clubbing/cyanosis [Soft] : abdomen soft [Non-distended] : non-distended [Non Tender] : non-tender [No HSM] : no HSM [No Masses] : no abdominal mass palpated [Normal Bowel Sounds] : normal bowel sounds [Normal Posterior Cervical Nodes] : no posterior cervical lymphadenopathy [Normal Anterior Cervical Nodes] : no anterior cervical lymphadenopathy [No Spinal Tenderness] : no spinal tenderness [No CVA Tenderness] : no CVA  tenderness [Grossly Normal Strength/Tone] : grossly normal strength/tone [No Rash] : no rash [No Joint Swelling] : no joint swelling [Normal Gait] : normal gait [Coordination Grossly Intact] : coordination grossly intact [No Focal Deficits] : no focal deficits [Normal Affect] : the affect was normal [Deep Tendon Reflexes (DTR)] : deep tendon reflexes were 2+ and symmetric [Normal Insight/Judgement] : insight and judgment were intact [de-identified] : near syncope likely due to anemia, low bp and heart rate [de-identified] : Pallor, grey appearance

## 2019-06-10 NOTE — H&P ADULT - ASSESSMENT
64 year old male with PMHx of EtOH abuse, A-Fib s/p watchman procedure on 4/2/19, anemia, CHF, COPD, GERD, cirrhosis, alcohol abuse, HTN, s/p left BKA presented to the ED on 6/10/19 s/p near syncopal episode in his cardiologists office found to have hypotension and bradycardia, unclear etiology  #Neuro: Possible alcohol withdrawal  -Monitor for signs of withdrawal, if agitated, will give ativan, provided BP allows  -Wrist restraints  #Resp: COPD, not in exacerbation  -Continue duonebs  #Cardio: Hypotension, bradycardia. Not secondary to anemia. Can be secondary to med overdose? Responded somewhat to glucagon and calcium  -Hold metoprolol, diltiazem  -Titrate off dopamine  -Start phenylephrine  -Trend troponin  -Trend lactic  -F/u echocardiogram read  -Aspirin, plavix  -Cardiology, EP consulted  #GI: Cirrhosis. Encourage EtOH cessation  #Renal: ANUSHKA, Cr 0.72 --> 1.33  -Likely prerenal, f/u repeat Cr s/p fluids  #Heme: Elevated INR  -Likely secondary to hepatic dysfunction. Continue to monitor  -Acute on chronic anemia, s/p 2 U PRBC  -No evidence of acute bleed, so will continue aspirin, plavix  #Endo: Diabetes, follow blood glucose, sliding scale  #FEN: NPO, hold vitamins  #Code: Full  #PPx: Hep sub q BID  #Dispo: Inpatient CCU for hypotension, bradycardia 64 year old male with PMHx of EtOH abuse, A-Fib s/p watchman procedure on 4/2/19, anemia, CHF, COPD, GERD, cirrhosis, alcohol abuse, HTN, s/p left BKA presented to the ED on 6/10/19 s/p near syncopal episode in his cardiologists office found to have hypotension and bradycardia, unclear etiology  #Neuro: Possible alcohol withdrawal  -Monitor for signs of withdrawal, if agitated, will give ativan, provided BP allows  -Wrist restraints  -Utox  #Resp: COPD, not in exacerbation  -Continue duonebs  #Cardio: Hypotension, bradycardia. Not secondary to anemia. Can be secondary to med overdose? Responded somewhat to glucagon and calcium  -Hold metoprolol, diltiazem  -Titrate off dopamine  -Start phenylephrine  -Trend troponin  -Trend lactic  -F/u echocardiogram read  -Aspirin, plavix  -Cardiology, EP consulted  #GI: Cirrhosis. Encourage EtOH cessation  #Renal: ANUSHKA, Cr 0.72 --> 1.33  -Likely prerenal, f/u repeat Cr s/p fluids  #Heme: Elevated INR  -Likely secondary to hepatic dysfunction. Continue to monitor  -Acute on chronic anemia, s/p 2 U PRBC  -No evidence of acute bleed, so will continue aspirin, plavix  #Endo: Diabetes, follow blood glucose, sliding scale  #FEN: NPO, hold vitamins  #Code: Full  #PPx: Hep sub q BID  #Dispo: Inpatient CCU for hypotension, bradycardia 64 year old male with PMHx of EtOH abuse, A-Fib s/p watchman procedure on 4/2/19, anemia, CHF, COPD, GERD, cirrhosis, alcohol abuse, HTN, s/p left BKA presented to the ED on 6/10/19 s/p near syncopal episode in his cardiologists office found to have hypotension and bradycardia, unclear etiology  #Neuro: Possible alcohol withdrawal  -Monitor for signs of withdrawal, if agitated, will give ativan, provided BP allows  -Wrist restraints  -Utox  #Resp: COPD, not in exacerbation  -Continue duonebs  #Cardio: Hypotension, bradycardia. Not secondary to anemia. Can be secondary to med overdose? Responded somewhat to glucagon and calcium  -Hold metoprolol, diltiazem  -Titrate off dopamine  -Start phenylephrine  -Trend troponin  -Trend lactic  -F/u echocardiogram read  -Aspirin, plavix  -Cardiology, EP consulted  #GI: Cirrhosis. Encourage EtOH cessation  #Renal: ANUSHKA, Cr 0.72 --> 1.33  -Likely prerenal, f/u repeat Cr s/p fluids  #Heme: Elevated INR  -Likely secondary to hepatic dysfunction. Continue to monitor  -Acute on chronic anemia, s/p 2 U PRBC  -No evidence of acute bleed, so will continue aspirin, plavix  #FEN: NPO, hold vitamins  #Code: Full  #PPx: Hep sub q BID  #Dispo: Inpatient CCU for hypotension, bradycardia

## 2019-06-10 NOTE — ED PROVIDER NOTE - MUSCULOSKELETAL, MLM
Spine appears normal, range of motion is not limited, no muscle or joint tenderness Spine appears normal, range of motion is not limited, no muscle or joint tenderness. LLE amputated +prosthesis

## 2019-06-10 NOTE — ED PROVIDER NOTE - CARDIAC, MLM
Normal rate, regular rhythm.  Heart sounds S1, S2.  No murmurs, rubs or gallops. +bradycardic, regular rhythm.  Heart sounds S1, S2.  No murmurs, rubs or gallops.

## 2019-06-10 NOTE — PATIENT PROFILE ADULT - NSPROPOAURINARYCATHETER_GEN_A_NUR
Dr Liliam MOODY ANDREW Carmona     no

## 2019-06-10 NOTE — H&P ADULT - NSHPPHYSICALEXAM_GEN_ALL_CORE
Physical Exam  T(C): 36.1 (06-10-19 @ 22:30), Max: 36.8 (06-10-19 @ 18:24)  HR: 68 (06-10-19 @ 22:30) (42 - 87)  BP: 93/49 (06-10-19 @ 22:30) (67/38 - 93/49)  RR: 20 (06-10-19 @ 22:30) (19 - 30)  SpO2: 97% (06-10-19 @ 22:30) (95% - 100%)  Gen: Diaphoretic, mild distress, anxious  HEENT: Anicteric sclera, moist mucous membranes, no JVD, no lymphadenopathy, dilated pupils  Cardio: Bradycardic, normal S1S2, no murmurs  Resp: Clear to auscultation bilaterally, no wheezing or rhonchi  GI: Nontender, nondistended, normoactive bowel sounds  Ext: L BKA, right A line (later on removed)  Neuro: Nonfocal

## 2019-06-10 NOTE — PATIENT PROFILE ADULT - NSPROGENDIFFINTUB_GEN_A_NUR
previously intubated - problems/Pt trached x2 as per daughter Adela.  Adela states "He has a lot of scar tissue from being trached".

## 2019-06-10 NOTE — HISTORY OF PRESENT ILLNESS
[FreeTextEntry1] : 64 f/o M smoker accompanied by his daughter, with history of persistent atrial fibrillation on rate control, HTN, alcoholism, recurrent falls s/p BKA, and CHF with preserved LV function.  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 He recently had Watchman device placed and was started on AC.  Shortly after he had GIB requiring several blood transfusions.  He had repeat MYA done which showed well seated Watchman device and no evidence thrombus - AC has since been stopped and he is now only on ASA.\par He notes that last week had episode lightheadedness and that his BP has been low.  Also notes exertional dyspnea\par He follows with Dr Clark \par \par Of note, 2018 he had a prolonged hospitalization with respiratory decompensation and had very elevated pulmonary pressures at that time. \par Smokes 1/2 PPD\par States last Etoh was 14 days ago\par

## 2019-06-11 DIAGNOSIS — R00.1 BRADYCARDIA, UNSPECIFIED: ICD-10-CM

## 2019-06-11 DIAGNOSIS — I48.2 CHRONIC ATRIAL FIBRILLATION: ICD-10-CM

## 2019-06-11 DIAGNOSIS — F10.10 ALCOHOL ABUSE, UNCOMPLICATED: ICD-10-CM

## 2019-06-11 DIAGNOSIS — I95.9 HYPOTENSION, UNSPECIFIED: ICD-10-CM

## 2019-06-11 DIAGNOSIS — D64.9 ANEMIA, UNSPECIFIED: ICD-10-CM

## 2019-06-11 LAB
AMPHET UR-MCNC: NEGATIVE — SIGNIFICANT CHANGE UP
ANION GAP SERPL CALC-SCNC: 6 MMOL/L — SIGNIFICANT CHANGE UP (ref 5–17)
APPEARANCE UR: CLEAR — SIGNIFICANT CHANGE UP
BACTERIA # UR AUTO: ABNORMAL
BARBITURATES UR SCN-MCNC: NEGATIVE — SIGNIFICANT CHANGE UP
BENZODIAZ UR-MCNC: NEGATIVE — SIGNIFICANT CHANGE UP
BILIRUB UR-MCNC: NEGATIVE — SIGNIFICANT CHANGE UP
BUN SERPL-MCNC: 14 MG/DL — SIGNIFICANT CHANGE UP (ref 7–23)
CALCIUM SERPL-MCNC: 8.9 MG/DL — SIGNIFICANT CHANGE UP (ref 8.5–10.1)
CHLORIDE SERPL-SCNC: 110 MMOL/L — HIGH (ref 96–108)
CO2 SERPL-SCNC: 25 MMOL/L — SIGNIFICANT CHANGE UP (ref 22–31)
COCAINE METAB.OTHER UR-MCNC: NEGATIVE — SIGNIFICANT CHANGE UP
COLOR SPEC: YELLOW — SIGNIFICANT CHANGE UP
CREAT SERPL-MCNC: 0.95 MG/DL — SIGNIFICANT CHANGE UP (ref 0.5–1.3)
DIFF PNL FLD: ABNORMAL
EPI CELLS # UR: NEGATIVE — SIGNIFICANT CHANGE UP
GLUCOSE SERPL-MCNC: 116 MG/DL — HIGH (ref 70–99)
GLUCOSE UR QL: NEGATIVE MG/DL — SIGNIFICANT CHANGE UP
HCT VFR BLD CALC: 32.1 % — LOW (ref 39–50)
HGB BLD-MCNC: 10.2 G/DL — LOW (ref 13–17)
KETONES UR-MCNC: NEGATIVE — SIGNIFICANT CHANGE UP
LACTATE SERPL-SCNC: 0.6 MMOL/L — LOW (ref 0.7–2)
LEUKOCYTE ESTERASE UR-ACNC: NEGATIVE — SIGNIFICANT CHANGE UP
MCHC RBC-ENTMCNC: 30.3 PG — SIGNIFICANT CHANGE UP (ref 27–34)
MCHC RBC-ENTMCNC: 31.8 GM/DL — LOW (ref 32–36)
MCV RBC AUTO: 95.3 FL — SIGNIFICANT CHANGE UP (ref 80–100)
METHADONE UR-MCNC: NEGATIVE — SIGNIFICANT CHANGE UP
NITRITE UR-MCNC: NEGATIVE — SIGNIFICANT CHANGE UP
OPIATES UR-MCNC: NEGATIVE — SIGNIFICANT CHANGE UP
PCP SPEC-MCNC: SIGNIFICANT CHANGE UP
PCP UR-MCNC: NEGATIVE — SIGNIFICANT CHANGE UP
PH UR: 6 — SIGNIFICANT CHANGE UP (ref 5–8)
PLATELET # BLD AUTO: 222 K/UL — SIGNIFICANT CHANGE UP (ref 150–400)
POTASSIUM SERPL-MCNC: 4.1 MMOL/L — SIGNIFICANT CHANGE UP (ref 3.5–5.3)
POTASSIUM SERPL-SCNC: 4.1 MMOL/L — SIGNIFICANT CHANGE UP (ref 3.5–5.3)
PROT UR-MCNC: NEGATIVE MG/DL — SIGNIFICANT CHANGE UP
RBC # BLD: 3.37 M/UL — LOW (ref 4.2–5.8)
RBC # FLD: 18.3 % — HIGH (ref 10.3–14.5)
RBC CASTS # UR COMP ASSIST: ABNORMAL /HPF (ref 0–4)
SODIUM SERPL-SCNC: 141 MMOL/L — SIGNIFICANT CHANGE UP (ref 135–145)
SP GR SPEC: 1.01 — SIGNIFICANT CHANGE UP (ref 1.01–1.02)
THC UR QL: POSITIVE — SIGNIFICANT CHANGE UP
TROPONIN I SERPL-MCNC: <0.015 NG/ML — SIGNIFICANT CHANGE UP (ref 0.01–0.04)
UROBILINOGEN FLD QL: NEGATIVE MG/DL — SIGNIFICANT CHANGE UP
WBC # BLD: 9.28 K/UL — SIGNIFICANT CHANGE UP (ref 3.8–10.5)
WBC # FLD AUTO: 9.28 K/UL — SIGNIFICANT CHANGE UP (ref 3.8–10.5)
WBC UR QL: SIGNIFICANT CHANGE UP

## 2019-06-11 PROCEDURE — 99223 1ST HOSP IP/OBS HIGH 75: CPT

## 2019-06-11 RX ORDER — GABAPENTIN 400 MG/1
400 CAPSULE ORAL THREE TIMES A DAY
Refills: 0 | Status: DISCONTINUED | OUTPATIENT
Start: 2019-06-11 | End: 2019-06-17

## 2019-06-11 RX ORDER — DEXTROSE 50 % IN WATER 50 %
15 SYRINGE (ML) INTRAVENOUS ONCE
Refills: 0 | Status: DISCONTINUED | OUTPATIENT
Start: 2019-06-11 | End: 2019-06-11

## 2019-06-11 RX ORDER — INSULIN LISPRO 100/ML
VIAL (ML) SUBCUTANEOUS
Refills: 0 | Status: DISCONTINUED | OUTPATIENT
Start: 2019-06-11 | End: 2019-06-11

## 2019-06-11 RX ORDER — DEXTROSE 50 % IN WATER 50 %
12.5 SYRINGE (ML) INTRAVENOUS ONCE
Refills: 0 | Status: DISCONTINUED | OUTPATIENT
Start: 2019-06-11 | End: 2019-06-11

## 2019-06-11 RX ORDER — HEPARIN SODIUM 5000 [USP'U]/ML
5000 INJECTION INTRAVENOUS; SUBCUTANEOUS EVERY 8 HOURS
Refills: 0 | Status: DISCONTINUED | OUTPATIENT
Start: 2019-06-11 | End: 2019-06-17

## 2019-06-11 RX ORDER — TIOTROPIUM BROMIDE 18 UG/1
1 CAPSULE ORAL; RESPIRATORY (INHALATION) DAILY
Refills: 0 | Status: DISCONTINUED | OUTPATIENT
Start: 2019-06-11 | End: 2019-06-17

## 2019-06-11 RX ORDER — IPRATROPIUM/ALBUTEROL SULFATE 18-103MCG
3 AEROSOL WITH ADAPTER (GRAM) INHALATION EVERY 6 HOURS
Refills: 0 | Status: DISCONTINUED | OUTPATIENT
Start: 2019-06-11 | End: 2019-06-17

## 2019-06-11 RX ORDER — ACETAMINOPHEN 500 MG
650 TABLET ORAL EVERY 6 HOURS
Refills: 0 | Status: DISCONTINUED | OUTPATIENT
Start: 2019-06-11 | End: 2019-06-17

## 2019-06-11 RX ORDER — DEXTROSE 50 % IN WATER 50 %
25 SYRINGE (ML) INTRAVENOUS ONCE
Refills: 0 | Status: DISCONTINUED | OUTPATIENT
Start: 2019-06-11 | End: 2019-06-11

## 2019-06-11 RX ORDER — INSULIN LISPRO 100/ML
VIAL (ML) SUBCUTANEOUS AT BEDTIME
Refills: 0 | Status: DISCONTINUED | OUTPATIENT
Start: 2019-06-11 | End: 2019-06-11

## 2019-06-11 RX ORDER — SODIUM CHLORIDE 9 MG/ML
1000 INJECTION, SOLUTION INTRAVENOUS
Refills: 0 | Status: DISCONTINUED | OUTPATIENT
Start: 2019-06-11 | End: 2019-06-12

## 2019-06-11 RX ORDER — SODIUM CHLORIDE 9 MG/ML
1000 INJECTION, SOLUTION INTRAVENOUS
Refills: 0 | Status: DISCONTINUED | OUTPATIENT
Start: 2019-06-11 | End: 2019-06-11

## 2019-06-11 RX ORDER — TAMSULOSIN HYDROCHLORIDE 0.4 MG/1
0.4 CAPSULE ORAL AT BEDTIME
Refills: 0 | Status: DISCONTINUED | OUTPATIENT
Start: 2019-06-11 | End: 2019-06-17

## 2019-06-11 RX ORDER — GLUCAGON INJECTION, SOLUTION 0.5 MG/.1ML
1 INJECTION, SOLUTION SUBCUTANEOUS ONCE
Refills: 0 | Status: DISCONTINUED | OUTPATIENT
Start: 2019-06-11 | End: 2019-06-11

## 2019-06-11 RX ADMIN — HEPARIN SODIUM 5000 UNIT(S): 5000 INJECTION INTRAVENOUS; SUBCUTANEOUS at 21:54

## 2019-06-11 RX ADMIN — PHENYLEPHRINE HYDROCHLORIDE 4.2 MICROGRAM(S)/KG/MIN: 10 INJECTION INTRAVENOUS at 04:07

## 2019-06-11 RX ADMIN — Medication 3 MILLILITER(S): at 07:40

## 2019-06-11 RX ADMIN — SODIUM CHLORIDE 75 MILLILITER(S): 9 INJECTION, SOLUTION INTRAVENOUS at 13:52

## 2019-06-11 RX ADMIN — DOPAMINE HYDROCHLORIDE 12.6 MICROGRAM(S)/KG/MIN: 40 INJECTION, SOLUTION, CONCENTRATE INTRAVENOUS at 07:23

## 2019-06-11 RX ADMIN — GABAPENTIN 400 MILLIGRAM(S): 400 CAPSULE ORAL at 13:52

## 2019-06-11 RX ADMIN — Medication 650 MILLIGRAM(S): at 15:35

## 2019-06-11 RX ADMIN — DOPAMINE HYDROCHLORIDE 12.6 MICROGRAM(S)/KG/MIN: 40 INJECTION, SOLUTION, CONCENTRATE INTRAVENOUS at 01:20

## 2019-06-11 RX ADMIN — GABAPENTIN 400 MILLIGRAM(S): 400 CAPSULE ORAL at 21:54

## 2019-06-11 RX ADMIN — Medication 81 MILLIGRAM(S): at 08:30

## 2019-06-11 RX ADMIN — Medication 3 MILLILITER(S): at 20:13

## 2019-06-11 RX ADMIN — DOPAMINE HYDROCHLORIDE 12.6 MICROGRAM(S)/KG/MIN: 40 INJECTION, SOLUTION, CONCENTRATE INTRAVENOUS at 13:52

## 2019-06-11 RX ADMIN — HEPARIN SODIUM 5000 UNIT(S): 5000 INJECTION INTRAVENOUS; SUBCUTANEOUS at 08:31

## 2019-06-11 RX ADMIN — Medication 650 MILLIGRAM(S): at 18:08

## 2019-06-11 RX ADMIN — PHENYLEPHRINE HYDROCHLORIDE 4.2 MICROGRAM(S)/KG/MIN: 10 INJECTION INTRAVENOUS at 01:02

## 2019-06-11 RX ADMIN — Medication 3 MILLILITER(S): at 03:11

## 2019-06-11 RX ADMIN — CLOPIDOGREL BISULFATE 75 MILLIGRAM(S): 75 TABLET, FILM COATED ORAL at 08:30

## 2019-06-11 NOTE — CONSULT NOTE ADULT - SUBJECTIVE AND OBJECTIVE BOX
HPI:  Mr. Boone is a 64 year old male with PMHx HFPEF (60% 2018), EtOH abuse, A-Fib s/p watchman procedure on 19, anemia, CHF, COPD, GERD, cirrhosis, HTN, s/p left BKA, NL Nuclear stress test 2018 who now presented to the ED s/p near syncopal episode. He reports he became lightheaded and pale and had mild abd pain and then passed out.  He was recently discharged from [facility] after having a GI bleed on 19; his last hemoglobin was 8.2 on . He denies recent melena, hematochezia, or CP. While at [facility], pt received 6 units PRBC's, 1 unit platelets and 1 unit plasma, and he had a colonoscopy while admitted showing internal hemorrhoids and a friable ulcer in the cecum. During that hospitalization, he was also noted to have low heart rate on , so he was monitored for another day, remained stable and was discharged. Here in ER was noted to be bradycardic and hypotensive. He received atropine x 2, calcium gluconate 1 g, glucagon 10 mg, dopamine, 2 units RBC and IV lorazepam for anxiety/EtOH. In the ED, patient received right sided fem A line. Pulled this out while in the CCU.     PAST MEDICAL & SURGICAL HISTORY:  HFpEF  COPD without exacerbation  Atrial fibrillation  Presence of Watchman left atrial appendage closure device  Hypertension  GERD (gastroesophageal reflux disease)  Chronic obstructive pulmonary disease (COPD)  Anemia  Falls  Meningitis  Collapsed lung  Alcohol withdrawal  Emphysema of lung  Cirrhosis  Poor historian  Alcohol abuse  Chronic atrial fibrillation  Unilateral amputation of lower extremity below knee  S/P BKA (below knee amputation) unilateral, left    SOCIAL HISTORY: Former Smoker/ ETOH abuse (1 bottle a vodka regularly)/ No Ilicit Drug use.    FAMILY HISTORY:  Family history unknown: Adopted    Allergies  Ceclor (Rash)  Duricef (Rash)    Home Medications:  albuterol 2.5 mg/3 mL (0.083%) inhalation solution: 3 milliliter(s) inhaled every 6 hours, As Needed -for shortness of breath and/or wheezing (2019 06:10)  aspirin 81 mg oral tablet: 1 tab(s) orally once a day (2019 06:10)  Cardizem  mg/24 hours oral capsule, extended release: 1 cap(s) orally once a day (2019 06:10)  Daily-Alexi Men&#x27;s Formula oral tablet: 1 tab(s) orally once a day (2019 06:10)  DilTIAZem Hydrochloride  mg/24 hours oral capsule, extended release: 1 cap(s) orally once a day (2019 06:10)  folic acid 1 mg oral tablet: 1 tab(s) orally once a day (2019 06:10)  furosemide 40 mg oral tablet: 1 tab(s) orally once a day (2019 06:10)  gabapentin 400 mg oral capsule: 1 cap(s) orally 3 times a day (2019 06:10)  ipratropium-albuterol 0.5 mg-2.5 mg/3 mLinhalation solution: 3 milliliter(s) inhaled every 6 hours (2019 06:10)  lisinopril 5 mg oral tablet: 1 tab(s) orally once a day (2019 06:10)  metoprolol succinate 100 mg oral tablet, extended release: 1 tab(s) orally once a day (2019 06:10)  Multiple Vitamins oral tablet: 1 tab(s) orally once a day (2019 06:10)  pantoprazole 40 mg oral granule, enteric coated: 40 milligram(s) orally once a day (2019 06:10)  Spiriva 18 mcg inhalation capsule: 1 cap(s) inhaled once a day (2019 06:10)  tamsulosin 0.4 mg oral capsule: 1 cap(s) orally once a day (at bedtime) (2019 06:10)  thiamine 100 mg oral tablet: 1 tab(s) orally once a day (2019 06:10)  traZODone 50 mg oral tablet: 1 tab(s) orally once a day (at bedtime), As Needed (2019 06:10)      HOSPITAL MEDICATIONS:   MEDICATIONS  (STANDING):  ALBUTerol/ipratropium for Nebulization 3 milliLiter(s) Nebulizer every 6 hours  aspirin  chewable 81 milliGRAM(s) Oral daily  clopidogrel Tablet 75 milliGRAM(s) Oral daily  DOPamine Infusion 3 MICROgram(s)/kG/Min (12.6 mL/Hr) IV Continuous <Continuous>  heparin  Injectable 5000 Unit(s) SubCutaneous every 8 hours  multivitamin/thiamine/folic acid in sodium chloride 0.9% 1000 milliLiter(s) (75 mL/Hr) IV Continuous <Continuous>    MEDICATIONS  (PRN):      REVIEW OF SYSTEMS: 13 systems were reviewed and all negative except for comments above.    Vital Signs Last 24 Hrs  T(C): 35.8 (2019 08:07), Max: 36.8 (10 [facility] 2019 18:24)  T(F): 96.5 (2019 08:07), Max: 98.2 (10 [facility] 2019 18:24)  HR: 108 (:) (42 - 108)  BP: 110/53 (:) (60/42 - 114/61)  BP(mean): 67 (:) (46 - 79)  RR: 23 (:) (17 - 30)  SpO2: 98% (:) (90% - 100%)Daily Height in cm: 182.88 (10 [facility] 2019 18:21)    Daily Weight in k.1 (2019 08:07)I&O's Summary    10 [facility] 2019 07:  -  2019 07:00  --------------------------------------------------------  IN: 0 mL / OUT: 1850 mL / NET: -1850 mL    2019 07:01  -  2019 10:13  --------------------------------------------------------  IN: 30 mL / OUT: 550 mL / NET: -520 mL        PHYSICAL EXAM:  Constitutional: NAD, awake and alert, well-developed  HEENT: PERRLA, EOMI,  No oral cyanosis. Oropharynx Clean and Dry.  Neck:  supple,  No JVD, No Thyroid enlargement. No Carotid Bruits bilaterally.  Respiratory: Breath sounds are clear bilaterally, No wheezing, rales or rhonchi  Cardiovascular: NL S1 and S2, RRR, no Murmurs, gallops or rubs  Gastrointestinal: Bowel Sounds present, soft, NT, ND, No HSM.   Extremities: No peripheral edema. No clubbing or cyanosis.  Barbeau Test performed in R+L UE and Negative.  Vascular: 2+ peripheral pulses in LE   Neurological: A/O x 3, no focal motor or sensory deficits  Musculoskeletal: no calf tenderness or joint swelling.  Skin: No rashes or lessions.      LABS: All Labs Reviewed:                        10.2   9.28  )-----------( 222      ( 2019 05:39 )             32.1                         7.2    7.50  )-----------( 210      ( 10 [facility] 2019 18:50 )             23.1     2019 05:39    141    |  110    |  14     ----------------------------<  116    4.1     |  25     |  0.95   10 [facility] 2019 18:50    137    |  105    |  17     ----------------------------<  149    4.5     |  25     |  1.33     Ca    8.9        2019 05:39  Ca    8.7        10 [facility] 2019 18:50  Mg     1.6       10 [facility] 2019 18:50    TPro  6.2    /  Alb  2.7    /  TBili  0.5    /  DBili  x      /  AST  22     /  ALT  21     /  AlkPhos  102    10 [facility] 2019 18:50    PT/INR - ( 10 [facility] 2019 18:50 )   PT: 13.1 sec;   INR: 1.17 ratio         PTT - ( 10 [facility] 2019 18:50 )  PTT:26.2 sec  CARDIAC MARKERS ( 2019 01:25 )  <0.015 ng/mL / x     / x     / x     / x      CARDIAC MARKERS ( 10 [facility] 2019 18:50 )  <0.015 ng/mL / x     / x     / x     / x                RADIOLOGY:    EKG:    ECHO:    CARDIAC CATHTERIZATION/STRESS TEST: HPI:  Mr. Boone is a 64 year old male with PMHx HFPEF (60% 2018), EtOH abuse, A-Fib s/p watchman procedure on 19, anemia, CHF, COPD, GERD, cirrhosis, HTN, s/p left BKA, NL Nuclear stress test 2018 who now presented to the ED s/p near syncopal episode. He reports he became lightheaded and pale and had mild abd pain and then passed out.  He was recently discharged from [facility] after having a GI bleed on 19; his last hemoglobin was 8.2 on . He denies recent melena, hematochezia, or CP. While at [facility], pt received 6 units PRBC's, 1 unit platelets and 1 unit plasma, and he had a colonoscopy while admitted showing internal hemorrhoids and a friable ulcer in the cecum. During that hospitalization, he was also noted to have low heart rate on , so he was monitored for another day, remained stable and was discharged. Here in ER was noted to be bradycardic and hypotensive. He received atropine x 2, calcium gluconate 1 g, glucagon 10 mg, dopamine, 2 units RBC and IV lorazepam for anxiety/EtOH. In the ED, patient received right sided fem A line. Pulled this out while in the CCU.     PAST MEDICAL & SURGICAL HISTORY:  HFpEF  COPD without exacerbation  Atrial fibrillation  Presence of Watchman left atrial appendage closure device  Hypertension  GERD (gastroesophageal reflux disease)  Chronic obstructive pulmonary disease (COPD)  Anemia  Falls  Meningitis  Collapsed lung  Alcohol withdrawal  Emphysema of lung  Cirrhosis  Poor historian  Alcohol abuse  Chronic atrial fibrillation  Unilateral amputation of lower extremity below knee  S/P BKA (below knee amputation) unilateral, left    SOCIAL HISTORY: Smoker/ ETOH abuse (1 bottle a vodka regularly)/ No Ilicit Drug use.    FAMILY HISTORY:  Family history unknown: Adopted    Allergies  Ceclor (Rash)  Duricef (Rash)    Home Medications:  albuterol 2.5 mg/3 mL (0.083%) inhalation solution: 3 milliliter(s) inhaled every 6 hours, As Needed -for shortness of breath and/or wheezing (2019 06:10)  aspirin 81 mg oral tablet: 1 tab(s) orally once a day (2019 06:10)  Cardizem  mg/24 hours oral capsule, extended release: 1 cap(s) orally once a day (2019 06:10)  Daily-Alexi Men&#x27;s Formula oral tablet: 1 tab(s) orally once a day (2019 06:10)  DilTIAZem Hydrochloride  mg/24 hours oral capsule, extended release: 1 cap(s) orally once a day (2019 06:10)  folic acid 1 mg oral tablet: 1 tab(s) orally once a day (2019 06:10)  furosemide 40 mg oral tablet: 1 tab(s) orally once a day (2019 06:10)  gabapentin 400 mg oral capsule: 1 cap(s) orally 3 times a day (2019 06:10)  ipratropium-albuterol 0.5 mg-2.5 mg/3 mLinhalation solution: 3 milliliter(s) inhaled every 6 hours (2019 06:10)  lisinopril 5 mg oral tablet: 1 tab(s) orally once a day (2019 06:10)  metoprolol succinate 100 mg oral tablet, extended release: 1 tab(s) orally once a day (2019 06:10)  Multiple Vitamins oral tablet: 1 tab(s) orally once a day (2019 06:10)  pantoprazole 40 mg oral granule, enteric coated: 40 milligram(s) orally once a day (2019 06:10)  Spiriva 18 mcg inhalation capsule: 1 cap(s) inhaled once a day (2019 06:10)  tamsulosin 0.4 mg oral capsule: 1 cap(s) orally once a day (at bedtime) (2019 06:10)  thiamine 100 mg oral tablet: 1 tab(s) orally once a day (2019 06:10)  traZODone 50 mg oral tablet: 1 tab(s) orally once a day (at bedtime), As Needed (2019 06:10)      HOSPITAL MEDICATIONS:   MEDICATIONS  (STANDING):  ALBUTerol/ipratropium for Nebulization 3 milliLiter(s) Nebulizer every 6 hours  aspirin  chewable 81 milliGRAM(s) Oral daily  clopidogrel Tablet 75 milliGRAM(s) Oral daily  DOPamine Infusion 3 MICROgram(s)/kG/Min (12.6 mL/Hr) IV Continuous <Continuous>  heparin  Injectable 5000 Unit(s) SubCutaneous every 8 hours  multivitamin/thiamine/folic acid in sodium chloride 0.9% 1000 milliLiter(s) (75 mL/Hr) IV Continuous <Continuous>    MEDICATIONS  (PRN):      REVIEW OF SYSTEMS: 13 systems were reviewed and all negative except for comments above.    Vital Signs Last 24 Hrs  T(C): 35.8 (2019 08:07), Max: 36.8 (10 [facility] 2019 18:24)  T(F): 96.5 (2019 08:07), Max: 98.2 (10 [facility] 2019 18:24)  HR: 108 (2019 09:00) (42 - 108)  BP: 110/53 (:) (60/42 - 114/61)  BP(mean): 67 (:) (46 - 79)  RR: 23 (:00) (17 - 30)  SpO2: 98% (:) (90% - 100%)Daily Height in cm: 182.88 (10 [facility] 2019 18:21)    Daily Weight in k.1 (2019 08:07)I&O's Summary    10 [facility] 2019 07:  -  2019 07:00  --------------------------------------------------------  IN: 0 mL / OUT: 1850 mL / NET: -1850 mL    2019 07:01  -  2019 10:13  --------------------------------------------------------  IN: 30 mL / OUT: 550 mL / NET: -520 mL        PHYSICAL EXAM:  Constitutional: NAD, awake and alert, well-developed  HEENT: PERRLA, EOMI,  No oral cyanosis. Oropharynx Clean and Dry.  Neck:  supple,  No JVD, No Thyroid enlargement. No Carotid Bruits bilaterally.  Respiratory: Breath sounds are clear bilaterally, No wheezing, rales or rhonchi  Cardiovascular: NL S1 and S2, IR, IR, 2/6 HERMILA to LLSB  Gastrointestinal: Bowel Sounds present, soft, Guiac negative in ER.   Extremities: No peripheral edema. No clubbing or cyanosis.  s/p amputation in LLE  Vascular: 1+ peripheral pulses in RLE   Neurological: A/O x 3, no focal motor deficits  Musculoskeletal: no calf tenderness.  Skin: No rashes.      LABS: All Labs Reviewed:                        10.2   9.28  )-----------( 222      ( 2019 05:39 )             32.1                         7.2    7.50  )-----------( 210      ( 10 [facility] 2019 18:50 )             23.1     2019 05:39    141    |  110    |  14     ----------------------------<  116    4.1     |  25     |  0.95   10 [facility] 2019 18:50    137    |  105    |  17     ----------------------------<  149    4.5     |  25     |  1.33     Ca    8.9        2019 05:39  Ca    8.7        10 [facility] 2019 18:50  Mg     1.6       10 [facility] 2019 18:50    TPro  6.2    /  Alb  2.7    /  TBili  0.5    /  DBili  x      /  AST  22     /  ALT  21     /  AlkPhos  102    10 [facility] 2019 18:50    PT/INR - ( 10 [facility] 2019 18:50 )   PT: 13.1 sec;   INR: 1.17 ratio         PTT - ( 10 [facility] 2019 18:50 )  PTT:26.2 sec  CARDIAC MARKERS ( 2019 01:25 )  <0.015 ng/mL / x     / x     / x     / x      CARDIAC MARKERS ( 10 [facility] 2019 18:50 )  <0.015 ng/mL / x     / x     / x     / x            EKG:    ECHO:    CARDIAC CATHTERIZATION/STRESS TEST: HPI:  Mr. Boone is a 64 year old male with PMHx HFPEF (60% 2018), EtOH abuse, A-Fib s/p watchman procedure on 19, anemia, CHF, COPD, GERD, cirrhosis, HTN, s/p left BKA, NL Nuclear stress test 2018 who now presented to the ED s/p near syncopal episode. He reports he became lightheaded and pale and had mild abd pain and then passed out.  He was recently discharged from [facility] after having a GI bleed on 19; his last hemoglobin was 8.2 on . He denies recent melena, hematochezia, or CP. While at [facility], pt received 6 units PRBC's, 1 unit platelets and 1 unit plasma, and he had a colonoscopy while admitted showing internal hemorrhoids and a friable ulcer in the cecum. During that hospitalization, he was also noted to have low heart rate on , so he was monitored for another day, remained stable and was discharged. Here in ER was noted to be bradycardic and hypotensive. He received atropine x 2, calcium gluconate 1 g, glucagon 10 mg, dopamine, 2 units RBC and IV lorazepam for anxiety/EtOH. In the ED, patient received right sided fem A line. Pulled this out while in the CCU.     After further investigation with pharmacist, Pt. was prescribed 180 mg of cardizem instead of 120 mg and started this on 6/10/19.      PAST MEDICAL & SURGICAL HISTORY:  HFpEF  COPD without exacerbation  Atrial fibrillation  Presence of Watchman left atrial appendage closure device  Hypertension  GERD (gastroesophageal reflux disease)  Chronic obstructive pulmonary disease (COPD)  Anemia  Falls  Meningitis  Collapsed lung  Alcohol withdrawal  Emphysema of lung  Cirrhosis  Poor historian  Alcohol abuse  Chronic atrial fibrillation  Unilateral amputation of lower extremity below knee  S/P BKA (below knee amputation) unilateral, left    SOCIAL HISTORY: Smoker/ ETOH abuse (1 bottle a vodka regularly)/ No Ilicit Drug use.    FAMILY HISTORY:  Family history unknown: Adopted    Allergies  Ceclor (Rash)  Duricef (Rash)    Home Medications:  albuterol 2.5 mg/3 mL (0.083%) inhalation solution: 3 milliliter(s) inhaled every 6 hours, As Needed -for shortness of breath and/or wheezing (2019 06:10)  aspirin 81 mg oral tablet: 1 tab(s) orally once a day (2019 06:10)  Cardizem  mg/24 hours oral capsule, extended release: 1 cap(s) orally once a day (2019 06:10)  Daily-Alexi Men&#x27;s Formula oral tablet: 1 tab(s) orally once a day (2019 06:10)  folic acid 1 mg oral tablet: 1 tab(s) orally once a day (2019 06:10)  furosemide 40 mg oral tablet: 1 tab(s) orally once a day (2019 06:10)  gabapentin 400 mg oral capsule: 1 cap(s) orally 3 times a day (2019 06:10)  ipratropium-albuterol 0.5 mg-2.5 mg/3 mLinhalation solution: 3 milliliter(s) inhaled every 6 hours (2019 06:10)  lisinopril 5 mg oral tablet: 1 tab(s) orally once a day (2019 06:10)  metoprolol succinate 100 mg oral tablet, extended release: 1 tab(s) orally once a day (2019 06:10)  Multiple Vitamins oral tablet: 1 tab(s) orally once a day (2019 06:10)  pantoprazole 40 mg oral granule, enteric coated: 40 milligram(s) orally once a day (2019 06:10)  Spiriva 18 mcg inhalation capsule: 1 cap(s) inhaled once a day (2019 06:10)  tamsulosin 0.4 mg oral capsule: 1 cap(s) orally once a day (at bedtime) (2019 06:10)  thiamine 100 mg oral tablet: 1 tab(s) orally once a day (2019 06:10)  traZODone 50 mg oral tablet: 1 tab(s) orally once a day (at bedtime), As Needed (2019 06:10)      HOSPITAL MEDICATIONS:   MEDICATIONS  (STANDING):  ALBUTerol/ipratropium for Nebulization 3 milliLiter(s) Nebulizer every 6 hours  aspirin  chewable 81 milliGRAM(s) Oral daily  clopidogrel Tablet 75 milliGRAM(s) Oral daily  DOPamine Infusion 3 MICROgram(s)/kG/Min (12.6 mL/Hr) IV Continuous <Continuous>  heparin  Injectable 5000 Unit(s) SubCutaneous every 8 hours  multivitamin/thiamine/folic acid in sodium chloride 0.9% 1000 milliLiter(s) (75 mL/Hr) IV Continuous <Continuous>    MEDICATIONS  (PRN):      REVIEW OF SYSTEMS: 13 systems were reviewed and all negative except for comments above.    Vital Signs Last 24 Hrs  T(C): 35.8 (2019 08:07), Max: 36.8 (10 [facility] 2019 18:24)  T(F): 96.5 (2019 08:07), Max: 98.2 (10 [facility] 2019 18:24)  HR: 108 (2019 09:00) (42 - 108)  BP: 110/53 (:) (60/42 - 114/61)  BP(mean): 67 (:) (46 - 79)  RR: 23 (:00) (17 - 30)  SpO2: 98% (:) (90% - 100%)Daily Height in cm: 182.88 (10 [facility] 2019 18:21)    Daily Weight in k.1 (2019 08:07)I&O's Summary    10 [facility] 2019 07:  -  2019 07:00  --------------------------------------------------------  IN: 0 mL / OUT: 1850 mL / NET: -1850 mL    2019 07:01  -  2019 10:13  --------------------------------------------------------  IN: 30 mL / OUT: 550 mL / NET: -520 mL        PHYSICAL EXAM:  Constitutional: NAD, awake and alert, well-developed  HEENT: PERRLA, EOMI,  No oral cyanosis. Oropharynx Clean and Dry.  Neck:  supple,  No JVD, No Thyroid enlargement. No Carotid Bruits bilaterally.  Respiratory: Breath sounds are clear bilaterally, No wheezing, rales or rhonchi  Cardiovascular: NL S1 and S2, IR, IR, 2/6 HERMILA to LLSB  Gastrointestinal: Bowel Sounds present, soft, Guiac negative in ER.   Extremities: No peripheral edema. No clubbing or cyanosis.  s/p amputation in LLE  Vascular: 1+ peripheral pulses in RLE   Neurological: A/O x 3, no focal motor deficits  Musculoskeletal: no calf tenderness.  Skin: No rashes.      LABS: All Labs Reviewed:                        10.2   9.28  )-----------( 222      ( 2019 05:39 )             32.1                         7.2    7.50  )-----------( 210      ( 10 [facility] 2019 18:50 )             23.1     2019 05:39    141    |  110    |  14     ----------------------------<  116    4.1     |  25     |  0.95   10 [facility] 2019 18:50    137    |  105    |  17     ----------------------------<  149    4.5     |  25     |  1.33     Ca    8.9        2019 05:39  Ca    8.7        10 [facility] 2019 18:50  Mg     1.6       10 [facility] 2019 18:50    TPro  6.2    /  Alb  2.7    /  TBili  0.5    /  DBili  x      /  AST  22     /  ALT  21     /  AlkPhos  102    10 [facility] 2019 18:50    PT/INR - ( 10 [facility] 2019 18:50 )   PT: 13.1 sec;   INR: 1.17 ratio         PTT - ( 10 [facility] 2019 18:50 )  PTT:26.2 sec  CARDIAC MARKERS ( 2019 01:25 )  <0.015 ng/mL / x     / x     / x     / x      CARDIAC MARKERS ( 10 [facility] 2019 18:50 )  <0.015 ng/mL / x     / x     / x     / x        EKG:  Afib with SVR, ASWMI    ECHO:  < from: Transthoracic Echocardiogram (06.10.19 @ 20:49) >   Summary     Estimated left ventricular ejection fraction is 60-65 %.   The left ventricle is normal in wall thickness, wall motion and   contractility as seen in limited views.   The left ventricle cavity is moderately dilated.   The left atrium is severely dilated.   The right atrium appears severely dilated.   Normal appearing right ventricle structure and function.   Normal aortic valve structure and function.   The mitral valve leaflets appear thin and normal.   Moderate (2+) mitral regurgitation is present.   Normal appearing tricuspid valve structure.   Mild to Moderate Tricuspid regurgitation is present.   Pulmonic valve not well seen.   No evidence of pericardial effusion.   No evidence of pleural effusion.   IVC is dilated and not collapsing with inspiration.    < end of copied text >    STRESS TEST:  < from: Nuclear Stress Test-Pharmacologic (10.30.17 @ 07:19) >  Normal myocardial perfusion scan, with no evidence of  infarction or inducible ischemia.  Gated wall motion analysis shows normal wall motion with  LVEF of 65%.    < end of copied text >

## 2019-06-11 NOTE — PROGRESS NOTE ADULT - SUBJECTIVE AND OBJECTIVE BOX
Mr. Boone is a 64 year old male with PMHx of EtOH abuse, A-Fib s/p watchman procedure on 19, anemia, CHF, COPD, GERD, cirrhosis, alcohol abuse, HTN, s/p left BKA presented to the ED on 6/10/19 s/p near syncopal episode in his cardiologists office. He had lightheadedness and mild abd pain. He was recently discharged from [facility] after having a GI bleed on 19; his last hemoglobin was 8.2 on . He denies recent melena, hematochezia, and CP. While at [facility], pt received 6 units PRBC's, 1 unit platelets and 1 unit plasma, and he had a colonoscopy while admitted showing internal hemorrhoids and a friable ulcer in the cecum. During that hospitalization, he was also noted to have low heart rate on , so he was monitored for another day, remained stable and was discharged.    In the ED, he was noted to be bradycardic and hypotensive  He received atropine x 2, calcium gluconate 1 g, glucagon 10 mg, dopamine, 2 units RBC and IV lorazepam for anxiety/EtOH  In the ED, patient received right sided fem A line. Pulled this out while in the CCU  Per daughter patient can have a bottle of vodka when drinking. His last drink was yesterday    : Patient seen in the CCU and remains on a Dopamine drip for Bradycardia.           PAST MEDICAL & SURGICAL HISTORY:  Chronic CHF  COPD without exacerbation  Atrial fibrillation  Presence of Watchman left atrial appendage closure device  Hypertension  GERD (gastroesophageal reflux disease)  Chronic obstructive pulmonary disease (COPD)  Anemia  Falls  Meningitis  Collapsed lung  Alcohol withdrawal  Emphysema of lung  Cirrhosis  CHF (congestive heart failure)  Poor historian  Alcohol abuse  Chronic atrial fibrillation  Unilateral amputation of lower extremity below knee  Presence of Watchman left atrial appendage closure device  S/P BKA (below knee amputation) unilateral, left      FAMILY HISTORY:  No pertinent family history in first degree relatives  Family history unknown: Adopted      Social Hx:    Allergies    Ceclor (Rash)  Duricef (Rash)    Intolerances        Height (cm): 182.88 (06-10 @ 18:21)  Weight (kg): 112 (06-10 @ 18:21)  BMI (kg/m2): 33.5 (06-10 @ 18:21)    ICU Vital Signs Last 24 Hrs  T(C): 35.8 (2019 08:07), Max: 36.8 (10 [facility] 2019 18:24)  T(F): 96.5 (2019 08:07), Max: 98.2 (10 [facility] 2019 18:24)  HR: 108 (:) (42 - 108)  BP: 110/53 (:) (60/42 - 114/61)  BP(mean): 67 (:) (46 - 79)  ABP: 94/50 (10 [facility] 2019 21:27) (90/49 - 99/52)  ABP(mean): --  RR: 23 (:) (17 - 30)  SpO2: 98% (:) (90% - 100%)          I&O's Summary    10 [facility] 2019 07:01  -  2019 07:00  --------------------------------------------------------  IN: 0 mL / OUT: 1850 mL / NET: -1850 mL    2019 07:01  -  2019 09:34  --------------------------------------------------------  IN: 30 mL / OUT: 550 mL / NET: -520 mL                              10.2   9.28  )-----------( 222      ( 2019 05:39 )             32.1       06-11    141  |  110<H>  |  14  ----------------------------<  116<H>  4.1   |  25  |  0.95    Ca    8.9      2019 05:39  Mg     1.6     06-10    TPro  6.2  /  Alb  2.7<L>  /  TBili  0.5  /  DBili  x   /  AST  22  /  ALT  21  /  AlkPhos  102  06-10      CARDIAC MARKERS ( 2019 01:25 )  <0.015 ng/mL / x     / x     / x     / x      CARDIAC MARKERS ( 10 [facility] 2019 18:50 )  <0.015 ng/mL / x     / x     / x     / x                Urinalysis Basic - ( 2019 00:30 )    Color: Yellow / Appearance: Clear / S.015 / pH: x  Gluc: x / Ketone: Negative  / Bili: Negative / Urobili: Negative mg/dL   Blood: x / Protein: Negative mg/dL / Nitrite: Negative   Leuk Esterase: Negative / RBC: 11-25 /HPF / WBC 3-5   Sq Epi: x / Non Sq Epi: Negative / Bacteria: Occasional        MEDICATIONS  (STANDING):  ALBUTerol/ipratropium for Nebulization 3 milliLiter(s) Nebulizer every 6 hours  aspirin  chewable 81 milliGRAM(s) Oral daily  clopidogrel Tablet 75 milliGRAM(s) Oral daily  DOPamine Infusion 3 MICROgram(s)/kG/Min (12.6 mL/Hr) IV Continuous <Continuous>  heparin  Injectable 5000 Unit(s) SubCutaneous every 12 hours  phenylephrine    Infusion 0.1 MICROgram(s)/kG/Min (4.2 mL/Hr) IV Continuous <Continuous>    MEDICATIONS  (PRN):      DVT Prophylaxis: SQH    Advanced Directives:  Discussed with:    Visit Information: 30 min    ** Time is exclusive of billed procedures and/or teaching and/or routine family updates.

## 2019-06-11 NOTE — CONSULT NOTE ADULT - SUBJECTIVE AND OBJECTIVE BOX
64 year old male admitted for near syncope at his cardiologists office. He was found to be hypotensive   and bradycardic.  He received two unites of PC in ED.  The cardiac monitor shows AT Fib  frequent episodes of Bradycardia till approximately 5:13 am.  He is now on a Dopamine drip and is in AT Fib with -115 BPM.    on 2019 he was admitted to [facility]     Mr. Boone is a 64 year old male with PMHx of EtOH abuse, A-Fib s/p watchman procedure on 19, anemia, CHF, COPD, GERD, cirrhosis, alcohol abuse, HTN, s/p left BKA presented to the ED on 6/10/19 s/p near syncopal episode in his cardiologists office. He had lightheadness and mild abd pain. He was recently discharged from [facility] after having a GI bleed on 19; his last hemoglobin was 8.2 on . He denies recent melena, hematochezia, and CP. While at [facility], pt received 6 units PRBC's, 1 unit platelets and 1 unit plasma, and he had a colonoscopy while admitted showing internal hemorrhoids and a friable ulcer in the cecum. During that hospitalization, he was also noted to have low heart rate on , so he was monitored for another day, remained stable and was discharged.    In the ED, he was noted to be bradycardic and hypotensive  He received atropine x 2, calcium gluconate 1 g, glucagon 10 mg, dopamine, 2 units RBC and IV lorazepam for anxiety/EtOH  In the ED, patient received right sided fem A line. Pulled this out while in the CCU  Per daughter patient can have a bottle of vodka when drinking. His last drink was yesterday (10 [facility] 2019 23:35)        PAST MEDICAL & SURGICAL HISTORY:  Chronic CHF  COPD without exacerbation  Atrial fibrillation  Presence of Watchman left atrial appendage closure device  Hypertension  GERD (gastroesophageal reflux disease)  Chronic obstructive pulmonary disease (COPD)  Anemia  Falls  Meningitis  Collapsed lung  Alcohol withdrawal  Emphysema of lung  Cirrhosis  CHF (congestive heart failure)  Poor historian  Alcohol abuse  Chronic atrial fibrillation  Unilateral amputation of lower extremity below knee  Presence of Watchman left atrial appendage closure device  S/P BKA (below knee amputation) unilateral, left      MEDICATIONS  (STANDING):  ALBUTerol/ipratropium for Nebulization 3 milliLiter(s) Nebulizer every 6 hours  aspirin  chewable 81 milliGRAM(s) Oral daily  clopidogrel Tablet 75 milliGRAM(s) Oral daily  DOPamine Infusion 3 MICROgram(s)/kG/Min (12.6 mL/Hr) IV Continuous <Continuous>  heparin  Injectable 5000 Unit(s) SubCutaneous every 8 hours  multivitamin/thiamine/folic acid in sodium chloride 0.9% 1000 milliLiter(s) (75 mL/Hr) IV Continuous <Continuous>    MEDICATIONS  (PRN):      Allergies    Ceclor (Rash)  Duricef (Rash)    Intolerances        SOCIAL HISTORY: Denies tobacco, etoh abuse or illicit drug use    FAMILY HISTORY:  No pertinent family history in first degree relatives  Family history unknown: Adopted      Vital Signs Last 24 Hrs  T(C): 35.8 (2019 08:07), Max: 36.8 (10 [facility] 2019 18:24)  T(F): 96.5 (2019 08:07), Max: 98.2 (10 [facility] 2019 18:24)  HR: 108 (2019 09:00) (42 - 108)  BP: 110/53 (2019 09:00) (60/42 - 114/61)  BP(mean): 67 (2019 07:00) (46 - 79)  RR: 23 (2019 09:00) (17 - 30)  SpO2: 98% (2019 09:00) (90% - 100%)    REVIEW OF SYSTEMS:    CONSTITUTIONAL:  As per HPI.  HEENT:  Eyes:  No diplopia or blurred vision. ENT:  No earache, sore throat or runny nose.  CARDIOVASCULAR:  No pressure, squeezing, strangling, tightness, heaviness or aching about the chest, neck, axilla or epigastrium.  RESPIRATORY:  No cough, shortness of breath, PND or orthopnea.  GASTROINTESTINAL:  No nausea, vomiting or diarrhea.  GENITOURINARY:  No dysuria, frequency or urgency.  MUSCULOSKELETAL:  As per HPI.  SKIN:  No change in skin, hair or nails.  NEUROLOGIC:  No paresthesias, fasciculations, seizures or weakness.  PSYCHIATRIC:  No disorder of thought or mood.  ENDOCRINE:  No heat or cold intolerance, polyuria or polydipsia.  HEMATOLOGICAL:  No easy bruising or bleedings:  .     PHYSICAL EXAMINATION:    GENERAL APPEARANCE:  Pt. is not currently dyspneic, in no distress. Pt. is alert, oriented, and pleasant.  HEENT:  Pupils are normal and react normally. No icterus. Mucous membranes well colored.  NECK:  Supple. No lymphadenopathy. Jugular venous pressure not elevated. Carotids equal.   HEART:   The cardiac impulse has a normal quality. There are no murmurs, rubs or gallops noted  CHEST:  Chest is clear to auscultation. Normal respiratory effort.  ABDOMEN:  Soft and nontender.   EXTREMITIES:  There is no edema.   SKIN:  No rash or significant lesions are noted.    I&O's Summary    10 [facility] 2019 07:  -  2019 07:00  --------------------------------------------------------  IN: 0 mL / OUT: 1850 mL / NET: -1850 mL    2019 07:01  -  2019 10:10  --------------------------------------------------------  IN: 30 mL / OUT: 550 mL / NET: -520 mL        LABS:                        10.2   9.28  )-----------( 222      ( 2019 05:39 )             32.1     06-11    141  |  110<H>  |  14  ----------------------------<  116<H>  4.1   |  25  |  0.95    Ca    8.9      2019 05:39  Mg     1.6     06-10    TPro  6.2  /  Alb  2.7<L>  /  TBili  0.5  /  DBili  x   /  AST  22  /  ALT  21  /  AlkPhos  102  06-10    LIVER FUNCTIONS - ( 10 [facility] 2019 18:50 )  Alb: 2.7 g/dL / Pro: 6.2 gm/dL / ALK PHOS: 102 U/L / ALT: 21 U/L / AST: 22 U/L / GGT: x           PT/INR - ( 10 [facility] 2019 18:50 )   PT: 13.1 sec;   INR: 1.17 ratio         PTT - ( 10 [facility] 2019 18:50 )  PTT:26.2 sec  CARDIAC MARKERS ( 2019 01:25 )  <0.015 ng/mL / x     / x     / x     / x      CARDIAC MARKERS ( 10 [facility] 2019 18:50 )  <0.015 ng/mL / x     / x     / x     / x          Urinalysis Basic - ( 2019 00:30 )    Color: Yellow / Appearance: Clear / S.015 / pH: x  Gluc: x / Ketone: Negative  / Bili: Negative / Urobili: Negative mg/dL   Blood: x / Protein: Negative mg/dL / Nitrite: Negative   Leuk Esterase: Negative / RBC: 11-25 /HPF / WBC 3-5   Sq Epi: x / Non Sq Epi: Negative / Bacteria: Occasional          EKG:    TELEMETRY:    CARDIAC TESTS:    RADIOLOGY & ADDITIONAL STUDIES:    ASSESSMENT & PLAN:      Thank-you for letting the EP Service  participate in the care of your patient. 64 year old male admitted for near syncope at his cardiologists office. He was found to be hypotensive   and bradycardic.  He received two unites of PC in ED.  The cardiac monitor shows AT Fib  frequent episodes of Bradycardia till approximately 5:13 am.  He is now on a Dopamine drip and is in AT Fib with -115 BPM.  He ia alert and appears to be comfortable. In the ED he had received AS 2 mgs, Glucagon and Ca Gluconate. He has a present history of alcohol abuse. He pulled out an arterial line while in the CCU this admission.  He had a watchman implant.    on 2019 he was admitted to [facility] where 2 also received blood, he is noted to have a ulcer in his  cecum.  During that hospitalization he was noted to be bradycardic as well.     PAST MEDICAL & SURGICAL HISTORY:  Chronic CHF  COPD without exacerbation  Atrial fibrillation  Presence of Watchman left atrial appendage closure device  Hypertension  GERD (gastroesophageal reflux disease)  Chronic obstructive pulmonary disease (COPD)  Anemia  Falls  Meningitis  Collapsed lung  Alcohol withdrawal  Emphysema of lung  Cirrhosis  CHF (congestive heart failure)  Poor historian  Alcohol abuse  Chronic atrial fibrillation  Unilateral amputation of lower extremity below knee  Presence of Watchman left atrial appendage closure device  S/P BKA (below knee amputation) unilateral, left      MEDICATIONS  (STANDING):  ALBUTerol/ipratropium for Nebulization 3 milliLiter(s) Nebulizer every 6 hours  aspirin  chewable 81 milliGRAM(s) Oral daily  clopidogrel Tablet 75 milliGRAM(s) Oral daily  DOPamine Infusion 3 MICROgram(s)/kG/Min (12.6 mL/Hr) IV Continuous <Continuous>  heparin  Injectable 5000 Unit(s) SubCutaneous every 8 hours  multivitamin/thiamine/folic acid in sodium chloride 0.9% 1000 milliLiter(s) (75 mL/Hr) IV Continuous <Continuous>    MEDICATIONS  (PRN):      Allergies    Ceclor (Rash)  Duricef (Rash)    Intolerances        SOCIAL HISTORY: Denies tobacco, illicit drug use.  He is known to have alcohol abuse.  He states his daughters help him with his medications    FAMILY HISTORY:  No pertinent family history in first degree relatives  Family history unknown: Adopted      Vital Signs Last 24 Hrs  T(C): 35.8 (2019 08:07), Max: 36.8 (10 [facility] 2019 18:24)  T(F): 96.5 (2019 08:07), Max: 98.2 (10 [facility] 2019 18:24)  HR: 108 (2019 09:00) (42 - 108)  BP: 110/53 (2019 09:00) (60/42 - 114/61)  BP(mean): 67 (2019 07:00) (46 - 79)  RR: 23 (2019 09:00) (17 - 30)  SpO2: 98% (:) (90% - 100%)    REVIEW OF SYSTEMS:    CONSTITUTIONAL:  As per HPI.  HEENT:  Eyes:  No diplopia or blurred vision. ENT:  No earache, sore throat or runny nose.  CARDIOVASCULAR:  No pressure, squeezing, strangling, tightness, heaviness or aching about the chest, neck, axilla or epigastrium.  RESPIRATORY:  No cough, shortness of breath, PND or orthopnea.  GASTROINTESTINAL:  No nausea, vomiting or diarrhea.  GENITOURINARY:  No dysuria, frequency or urgency.  MUSCULOSKELETAL:  As per HPI.  SKIN:  No change in skin, hair or nails.  NEUROLOGIC:  No paresthesias, fasciculations, seizures or weakness, he is a poor historian  PSYCHIATRIC:  No disorder of thought or mood.  ENDOCRINE:  No heat or cold intolerance, polyuria or polydipsia.  HEMATOLOGICAL:  No easy bruising or bleedings:  .     PHYSICAL EXAMINATION:    GENERAL APPEARANCE:  Pt. is not currently dyspneic, in no distress. Pt. is alert, oriented, and pleasant.  HEENT:  Pupils are normal and react normally. No icterus. Mucous membranes well colored.  NECK:  Supple. No lymphadenopathy. Jugular venous pressure not elevated. Carotids equal.   HEART:   The cardiac impulse has a normal quality. There are no murmurs, rubs or gallops noted  CHEST:  Chest is clear to auscultation. Normal respiratory effort.  ABDOMEN:  Soft and nontender.   EXTREMITIES:  There is no edema., left BKA Amputation  SKIN:  No rash or significant lesions are noted.    I&O's Summary    10 [facility] 2019 07:01  -  2019 07:00  --------------------------------------------------------  IN: 0 mL / OUT: 1850 mL / NET: -1850 mL    2019 07:01  -  2019 10:10  --------------------------------------------------------  IN: 30 mL / OUT: 550 mL / NET: -520 mL        LABS:                        10.2   9.28  )-----------( 222      ( 2019 05:39 )             32.1     06-11    141  |  110<H>  |  14  ----------------------------<  116<H>  4.1   |  25  |  0.95    Ca    8.9      2019 05:39  Mg     1.6     06-10    TPro  6.2  /  Alb  2.7<L>  /  TBili  0.5  /  DBili  x   /  AST  22  /  ALT  21  /  AlkPhos  102  06-10    LIVER FUNCTIONS - ( 10 [facility] 2019 18:50 )  Alb: 2.7 g/dL / Pro: 6.2 gm/dL / ALK PHOS: 102 U/L / ALT: 21 U/L / AST: 22 U/L / GGT: x           PT/INR - ( 10 [facility] 2019 18:50 )   PT: 13.1 sec;   INR: 1.17 ratio         PTT - ( 10 [facility] 2019 18:50 )  PTT:26.2 sec  CARDIAC MARKERS ( 2019 01:25 )  <0.015 ng/mL / x     / x     / x     / x      CARDIAC MARKERS ( 10 [facility] 2019 18:50 )  <0.015 ng/mL / x     / x     / x     / x          Urinalysis Basic - ( 2019 00:30 )    Color: Yellow / Appearance: Clear / S.015 / pH: x  Gluc: x / Ketone: Negative  / Bili: Negative / Urobili: Negative mg/dL   Blood: x / Protein: Negative mg/dL / Nitrite: Negative   Leuk Esterase: Negative / RBC: 11-25 /HPF / WBC 3-5   Sq Epi: x / Non Sq Epi: Negative / Bacteria: Occasional          EK/10/2019 At Fib with VR 43 BPM . possible septal infarction: age undetermined.     TELEMETRY:  This pt is in chronic t Fib,  presently his CM shows AT Fib with -110 BPM (while on Dopamine drip)    CARDIAC TESTS:    RADIOLOGY & ADDITIONAL STUDIES:  < from: Transthoracic Echocardiogram (06.10.19 @ 20:49) >   Impression     Summary     Estimated left ventricular ejection fraction is 60-65 %.   The left ventricle is normal in wall thickness, wall motion and   contractility as seen in limited views.   The left ventricle cavity is moderately dilated.   The left atrium is severely dilated.   The right atrium appears severely dilated.   Normal appearing right ventricle structure and function.   Normal aortic valve structure and function.   The mitral valve leaflets appear thin and normal.   Moderate (2+) mitral regurgitation is present.   Normal appearing tricuspid valve structure.   Mild to Moderate Tricuspid regurgitation is present.   Pulmonic valve not well seen.   No evidence of pericardial effusion.   No evidence of pleural effusion.   IVC is dilated and not collapsing with inspiration.    < end of copied text > 64 year old male admitted for near syncope at his cardiologists office. He was found to be hypotensive   and bradycardic.  He received two unites of PC in ED.  The cardiac monitor shows AT Fib  frequent episodes of Bradycardia till approximately 5:13 am.  He is now on a Dopamine drip and is in AT Fib with -115 BPM.  He ia alert and appears to be comfortable. In the ED he had received AS 2 mgs, Glucagon and Ca Gluconate. He has a present history of alcohol abuse. He pulled out an arterial line while in the CCU this admission.  He had a watchman implant from  of this year.    on 2019 he was admitted to [facility] where 2 also received blood, he is noted to have a ulcer in his  cecum.  During that hospitalization he was noted to be bradycardic as well.     PAST MEDICAL & SURGICAL HISTORY:  Chronic CHF  COPD without exacerbation  Atrial fibrillation  Presence of Watchman left atrial appendage closure device  Hypertension  GERD (gastroesophageal reflux disease)  Chronic obstructive pulmonary disease (COPD)  Anemia  Falls  Meningitis  Collapsed lung  Alcohol withdrawal  Emphysema of lung  Cirrhosis  CHF (congestive heart failure)  Poor historian  Alcohol abuse  Chronic atrial fibrillation  Unilateral amputation of lower extremity below knee  Presence of Watchman left atrial appendage closure device  S/P BKA (below knee amputation) unilateral, left      MEDICATIONS  (STANDING):  ALBUTerol/ipratropium for Nebulization 3 milliLiter(s) Nebulizer every 6 hours  aspirin  chewable 81 milliGRAM(s) Oral daily  clopidogrel Tablet 75 milliGRAM(s) Oral daily  DOPamine Infusion 3 MICROgram(s)/kG/Min (12.6 mL/Hr) IV Continuous <Continuous>  heparin  Injectable 5000 Unit(s) SubCutaneous every 8 hours  multivitamin/thiamine/folic acid in sodium chloride 0.9% 1000 milliLiter(s) (75 mL/Hr) IV Continuous <Continuous>    MEDICATIONS  (PRN):      Allergies    Ceclor (Rash)  Duricef (Rash)    Intolerances        SOCIAL HISTORY: Denies tobacco, illicit drug use.  He is known to have alcohol abuse.  He states his daughters help him with his medications    FAMILY HISTORY:  No pertinent family history in first degree relatives  Family history unknown: Adopted      Vital Signs Last 24 Hrs  T(C): 35.8 (2019 08:07), Max: 36.8 (10 [facility] 2019 18:24)  T(F): 96.5 (2019 08:07), Max: 98.2 (10 [facility] 2019 18:24)  HR: 108 (2019 09:00) (42 - 108)  BP: 110/53 (2019 09:00) (60/42 - 114/61)  BP(mean): 67 (2019 07:00) (46 - 79)  RR: 23 (2019 09:) (17 - 30)  SpO2: 98% (:) (90% - 100%)    REVIEW OF SYSTEMS:    CONSTITUTIONAL:  As per HPI.  HEENT:  Eyes:  No diplopia or blurred vision. ENT:  No earache, sore throat or runny nose.  CARDIOVASCULAR:  No pressure, squeezing, strangling, tightness, heaviness or aching about the chest, neck, axilla or epigastrium.  RESPIRATORY:  No cough, shortness of breath, PND or orthopnea.  GASTROINTESTINAL:  No nausea, vomiting or diarrhea.  GENITOURINARY:  No dysuria, frequency or urgency.  MUSCULOSKELETAL:  As per HPI.  SKIN:  No change in skin, hair or nails.  NEUROLOGIC:  No paresthesias, fasciculations, seizures or weakness, he is a poor historian  PSYCHIATRIC:  No disorder of thought or mood.  ENDOCRINE:  No heat or cold intolerance, polyuria or polydipsia.  HEMATOLOGICAL:  No easy bruising or bleedings:  .     PHYSICAL EXAMINATION:    GENERAL APPEARANCE:  Pt. is not currently dyspneic, in no distress. Pt. is alert, oriented, and pleasant.  HEENT:  Pupils are normal and react normally. No icterus. Mucous membranes well colored.  NECK:  Supple. No lymphadenopathy. Jugular venous pressure not elevated. Carotids equal.   HEART:   The cardiac impulse has a normal quality. There are no murmurs, rubs or gallops noted  CHEST:  Chest is clear to auscultation. Normal respiratory effort.  ABDOMEN:  Soft and nontender.   EXTREMITIES:  There is no edema., left BKA Amputation  SKIN:  No rash or significant lesions are noted.    I&O's Summary    10 [facility] 2019 07:01  -  2019 07:00  --------------------------------------------------------  IN: 0 mL / OUT: 1850 mL / NET: -1850 mL    2019 07:01  -  2019 10:10  --------------------------------------------------------  IN: 30 mL / OUT: 550 mL / NET: -520 mL        LABS:                        10.2   9.28  )-----------( 222      ( 2019 05:39 )             32.1     06-11    141  |  110<H>  |  14  ----------------------------<  116<H>  4.1   |  25  |  0.95    Ca    8.9      2019 05:39  Mg     1.6     06-10    TPro  6.2  /  Alb  2.7<L>  /  TBili  0.5  /  DBili  x   /  AST  22  /  ALT  21  /  AlkPhos  102  06-10    LIVER FUNCTIONS - ( 10 [facility] 2019 18:50 )  Alb: 2.7 g/dL / Pro: 6.2 gm/dL / ALK PHOS: 102 U/L / ALT: 21 U/L / AST: 22 U/L / GGT: x           PT/INR - ( 10 [facility] 2019 18:50 )   PT: 13.1 sec;   INR: 1.17 ratio         PTT - ( 10 [facility] 2019 18:50 )  PTT:26.2 sec  CARDIAC MARKERS ( 2019 01:25 )  <0.015 ng/mL / x     / x     / x     / x      CARDIAC MARKERS ( 10 [facility] 2019 18:50 )  <0.015 ng/mL / x     / x     / x     / x          Urinalysis Basic - ( 2019 00:30 )    Color: Yellow / Appearance: Clear / S.015 / pH: x  Gluc: x / Ketone: Negative  / Bili: Negative / Urobili: Negative mg/dL   Blood: x / Protein: Negative mg/dL / Nitrite: Negative   Leuk Esterase: Negative / RBC: 11-25 /HPF / WBC 3-5   Sq Epi: x / Non Sq Epi: Negative / Bacteria: Occasional          EK/10/2019 At Fib with VR 43 BPM . possible septal infarction: age undetermined.     TELEMETRY:  This pt is in chronic t Fib,  presently his CM shows AT Fib with -110 BPM (while on Dopamine drip)    CARDIAC TESTS:    RADIOLOGY & ADDITIONAL STUDIES:  < from: Transthoracic Echocardiogram (06.10.19 @ 20:49) >   Impression     Summary     Estimated left ventricular ejection fraction is 60-65 %.   The left ventricle is normal in wall thickness, wall motion and   contractility as seen in limited views.   The left ventricle cavity is moderately dilated.   The left atrium is severely dilated.   The right atrium appears severely dilated.   Normal appearing right ventricle structure and function.   Normal aortic valve structure and function.   The mitral valve leaflets appear thin and normal.   Moderate (2+) mitral regurgitation is present.   Normal appearing tricuspid valve structure.   Mild to Moderate Tricuspid regurgitation is present.   Pulmonic valve not well seen.   No evidence of pericardial effusion.   No evidence of pleural effusion.   IVC is dilated and not collapsing with inspiration.    < end of copied text >

## 2019-06-11 NOTE — CONSULT NOTE ADULT - PROBLEM SELECTOR RECOMMENDATION 9
Likely related to overdosing off cardizem in combo of being anemic, possible GI bleeding, and possible ETOH use.  Hold all rate controlling drugs.  Will taper dopamine.  Monitor BP and HR.

## 2019-06-11 NOTE — CONSULT NOTE ADULT - ASSESSMENT
ASSESSMENT & PLAN:   64 year old male with Bradycardia and hypotension who is a known alcoholic and drank 1 liter of vodka within 72 hours of admission (according to his daughter).  He is in Chronic AT [facility], [facility], had a watchman procedure 4/2/19  He was given BB antidotes in ED along with Atropine Sulfate, along with 2 units of blood.  CM shows AT [facility], [facility] with -120 BPM presently while Dopamine is being infused.  HFpEF     Continue to monitor this pt  Hydration  Maintain normal lytes and Mg level  Titrate off Dopamine    Thank-you for letting the EP Service  participate in the care of your patient. ASSESSMENT & PLAN:   64 year old male with Bradycardia and hypotension who is a known alcoholic and drank 1 liter of vodka within 72 hours of admission (according to his daughter).  He is in Chronic AT [facility], had a watchman procedure 4/2/19  He was given BB antidotes in ED along with Atropine Sulfate, along with 2 units of blood.  Blood work positive for THC and it is unclear if  pt has taken to much of his BB.  CM shows AT [facility] with -120 BPM presently while Dopamine is being infused.  HFpEF     Continue to monitor this pt  Hydration  Maintain normal lytes and Mg level  Titrate off Dopamine    Thank-you for letting the EP Service  participate in the care of your patient.

## 2019-06-11 NOTE — PROGRESS NOTE ADULT - ASSESSMENT
64 year old male with PMHx of EtOH abuse, A-Fib s/p watchman procedure on 4/2/19, anemia, CHF, COPD, GERD, cirrhosis, alcohol abuse, HTN, s/p left BKA presented to the ED on 6/10/19 s/p near syncopal episode in his cardiologists office found to have hypotension and bradycardia, unclear etiology      Plan:  CCU    PULM: Current Smoker, continue BPDs    Cardio: Continue Dopa for rolando.  EP consulted, CCB and BBx D/C for now.  Continue ASA and Plavix    Renal: Was in [place] likely from ATN.  Cr now improved    GI: Hx Cirrhosis and LGIB.  Guaiac negative in ED.     PSY: ETOH abuse.  Watch for withdrawal.  ADD banana bag     Change SQH to Q8H

## 2019-06-12 LAB
ANION GAP SERPL CALC-SCNC: 4 MMOL/L — LOW (ref 5–17)
BUN SERPL-MCNC: 9 MG/DL — SIGNIFICANT CHANGE UP (ref 7–23)
CALCIUM SERPL-MCNC: 8.4 MG/DL — LOW (ref 8.5–10.1)
CHLORIDE SERPL-SCNC: 108 MMOL/L — SIGNIFICANT CHANGE UP (ref 96–108)
CO2 SERPL-SCNC: 27 MMOL/L — SIGNIFICANT CHANGE UP (ref 22–31)
CREAT SERPL-MCNC: 0.85 MG/DL — SIGNIFICANT CHANGE UP (ref 0.5–1.3)
GLUCOSE SERPL-MCNC: 100 MG/DL — HIGH (ref 70–99)
HCT VFR BLD CALC: 25.8 % — LOW (ref 39–50)
HGB BLD-MCNC: 8.1 G/DL — LOW (ref 13–17)
MAGNESIUM SERPL-MCNC: 1.7 MG/DL — SIGNIFICANT CHANGE UP (ref 1.6–2.6)
MCHC RBC-ENTMCNC: 30.3 PG — SIGNIFICANT CHANGE UP (ref 27–34)
MCHC RBC-ENTMCNC: 31.4 GM/DL — LOW (ref 32–36)
MCV RBC AUTO: 96.6 FL — SIGNIFICANT CHANGE UP (ref 80–100)
PHOSPHATE SERPL-MCNC: 3.3 MG/DL — SIGNIFICANT CHANGE UP (ref 2.5–4.5)
PLATELET # BLD AUTO: 169 K/UL — SIGNIFICANT CHANGE UP (ref 150–400)
POTASSIUM SERPL-MCNC: 4.1 MMOL/L — SIGNIFICANT CHANGE UP (ref 3.5–5.3)
POTASSIUM SERPL-SCNC: 4.1 MMOL/L — SIGNIFICANT CHANGE UP (ref 3.5–5.3)
RBC # BLD: 2.67 M/UL — LOW (ref 4.2–5.8)
RBC # FLD: 17.9 % — HIGH (ref 10.3–14.5)
SODIUM SERPL-SCNC: 139 MMOL/L — SIGNIFICANT CHANGE UP (ref 135–145)
WBC # BLD: 5.46 K/UL — SIGNIFICANT CHANGE UP (ref 3.8–10.5)
WBC # FLD AUTO: 5.46 K/UL — SIGNIFICANT CHANGE UP (ref 3.8–10.5)

## 2019-06-12 PROCEDURE — 99233 SBSQ HOSP IP/OBS HIGH 50: CPT

## 2019-06-12 RX ORDER — OXYCODONE HYDROCHLORIDE 5 MG/1
5 TABLET ORAL EVERY 4 HOURS
Refills: 0 | Status: DISCONTINUED | OUTPATIENT
Start: 2019-06-12 | End: 2019-06-17

## 2019-06-12 RX ORDER — DILTIAZEM HCL 120 MG
60 CAPSULE, EXT RELEASE 24 HR ORAL THREE TIMES A DAY
Refills: 0 | Status: DISCONTINUED | OUTPATIENT
Start: 2019-06-12 | End: 2019-06-14

## 2019-06-12 RX ORDER — DILTIAZEM HCL 120 MG
60 CAPSULE, EXT RELEASE 24 HR ORAL ONCE
Refills: 0 | Status: COMPLETED | OUTPATIENT
Start: 2019-06-12 | End: 2019-06-12

## 2019-06-12 RX ADMIN — GABAPENTIN 400 MILLIGRAM(S): 400 CAPSULE ORAL at 22:05

## 2019-06-12 RX ADMIN — Medication 3 MILLILITER(S): at 08:44

## 2019-06-12 RX ADMIN — OXYCODONE HYDROCHLORIDE 5 MILLIGRAM(S): 5 TABLET ORAL at 22:06

## 2019-06-12 RX ADMIN — Medication 60 MILLIGRAM(S): at 16:45

## 2019-06-12 RX ADMIN — TIOTROPIUM BROMIDE 1 CAPSULE(S): 18 CAPSULE ORAL; RESPIRATORY (INHALATION) at 08:44

## 2019-06-12 RX ADMIN — CLOPIDOGREL BISULFATE 75 MILLIGRAM(S): 75 TABLET, FILM COATED ORAL at 11:19

## 2019-06-12 RX ADMIN — Medication 650 MILLIGRAM(S): at 00:40

## 2019-06-12 RX ADMIN — Medication 3 MILLILITER(S): at 13:49

## 2019-06-12 RX ADMIN — HEPARIN SODIUM 5000 UNIT(S): 5000 INJECTION INTRAVENOUS; SUBCUTANEOUS at 22:06

## 2019-06-12 RX ADMIN — GABAPENTIN 400 MILLIGRAM(S): 400 CAPSULE ORAL at 05:40

## 2019-06-12 RX ADMIN — GABAPENTIN 400 MILLIGRAM(S): 400 CAPSULE ORAL at 13:13

## 2019-06-12 RX ADMIN — Medication 3 MILLILITER(S): at 01:50

## 2019-06-12 RX ADMIN — TAMSULOSIN HYDROCHLORIDE 0.4 MILLIGRAM(S): 0.4 CAPSULE ORAL at 22:05

## 2019-06-12 RX ADMIN — HEPARIN SODIUM 5000 UNIT(S): 5000 INJECTION INTRAVENOUS; SUBCUTANEOUS at 05:40

## 2019-06-12 RX ADMIN — Medication 3 MILLILITER(S): at 21:24

## 2019-06-12 RX ADMIN — OXYCODONE HYDROCHLORIDE 5 MILLIGRAM(S): 5 TABLET ORAL at 23:00

## 2019-06-12 RX ADMIN — TAMSULOSIN HYDROCHLORIDE 0.4 MILLIGRAM(S): 0.4 CAPSULE ORAL at 00:40

## 2019-06-12 RX ADMIN — HEPARIN SODIUM 5000 UNIT(S): 5000 INJECTION INTRAVENOUS; SUBCUTANEOUS at 13:13

## 2019-06-12 RX ADMIN — Medication 81 MILLIGRAM(S): at 11:19

## 2019-06-12 RX ADMIN — Medication 60 MILLIGRAM(S): at 22:05

## 2019-06-12 NOTE — PROGRESS NOTE ADULT - SUBJECTIVE AND OBJECTIVE BOX
Mr. Boone is a 64 year old male with PMHx of EtOH abuse, A-Fib s/p watchman procedure on 19, anemia, CHF, COPD, GERD, cirrhosis, alcohol abuse, HTN, s/p left BKA presented to the ED on 6/10/19 s/p near syncopal episode in his cardiologists office. He had lightheadedness and mild abd pain. He was recently discharged from [facility] after having a GI bleed on 19; his last hemoglobin was 8.2 on . He denies recent melena, hematochezia, and CP. While at [facility], pt received 6 units PRBC's, 1 unit platelets and 1 unit plasma, and he had a colonoscopy while admitted showing internal hemorrhoids and a friable ulcer in the cecum. During that hospitalization, he was also noted to have low heart rate on , so he was monitored for another day, remained stable and was discharged.    In the ED, he was noted to be bradycardic and hypotensive  He received atropine x 2, calcium gluconate 1 g, glucagon 10 mg, dopamine, 2 units RBC and IV lorazepam for anxiety/EtOH  In the ED, patient received right sided fem A line. Pulled this out while in the CCU  Per daughter patient can have a bottle of vodka when drinking. His last drink was yesterday    : Patient seen in the CCU and remains on a Dopamine drip for Bradycardia.      : No bradycardia or hypotension off Dopa.  No melena or BRBPR         PAST MEDICAL & SURGICAL HISTORY:  Chronic CHF  COPD without exacerbation  Atrial fibrillation  Presence of Watchman left atrial appendage closure device  Hypertension  GERD (gastroesophageal reflux disease)  Chronic obstructive pulmonary disease (COPD)  Anemia  Falls  Meningitis  Collapsed lung  Alcohol withdrawal  Emphysema of lung  Cirrhosis  CHF (congestive heart failure)  Poor historian  Alcohol abuse  Chronic atrial fibrillation  Unilateral amputation of lower extremity below knee  Presence of Watchman left atrial appendage closure device  S/P BKA (below knee amputation) unilateral, left      FAMILY HISTORY:  No pertinent family history in first degree relatives  Family history unknown: Adopted      Social Hx:    Allergies    Ceclor (Rash)  Duricef (Rash)    Intolerances            ICU Vital Signs Last 24 Hrs  T(C): 36.4 (2019 08:45), Max: 36.6 (2019 21:32)  T(F): 97.6 (2019 08:45), Max: 97.9 (2019 21:32)  HR: 95 (2019 09:00) (58 - 118)  BP: 118/60 (:) (80/40 - 134/77)  BP(mean): 75 (2019 09:00) (34 - 89)  ABP: --  ABP(mean): --  RR: 25 (2019 09:00) (19 - 31)  SpO2: 96% (2019 09:) (81% - 100%)          I&O's Summary    2019 07:01  -  2019 07:00  --------------------------------------------------------  IN: 1930 mL / OUT: 3950 mL / NET: -2020 mL    2019 07:01  -  2019 09:53  --------------------------------------------------------  IN: 540 mL / OUT: 650 mL / NET: -110 mL                              8.1    5.46  )-----------( 169      ( 2019 06:08 )             25.8       06-12    139  |  108  |  9   ----------------------------<  100<H>  4.1   |  27  |  0.85    Ca    8.4<L>      2019 06:08  Phos  3.3     06-12  Mg     1.7     06-12    TPro  6.2  /  Alb  2.7<L>  /  TBili  0.5  /  DBili  x   /  AST  22  /  ALT  21  /  AlkPhos  102  06-10      CARDIAC MARKERS ( 2019 01:25 )  <0.015 ng/mL / x     / x     / x     / x      CARDIAC MARKERS ( 10 Jackson 2019 18:50 )  <0.015 ng/mL / x     / x     / x     / x                Urinalysis Basic - ( 2019 00:30 )    Color: Yellow / Appearance: Clear / S.015 / pH: x  Gluc: x / Ketone: Negative  / Bili: Negative / Urobili: Negative mg/dL   Blood: x / Protein: Negative mg/dL / Nitrite: Negative   Leuk Esterase: Negative / RBC: 11-25 /HPF / WBC 3-5   Sq Epi: x / Non Sq Epi: Negative / Bacteria: Occasional        MEDICATIONS  (STANDING):  ALBUTerol/ipratropium for Nebulization 3 milliLiter(s) Nebulizer every 6 hours  aspirin  chewable 81 milliGRAM(s) Oral daily  clopidogrel Tablet 75 milliGRAM(s) Oral daily  gabapentin 400 milliGRAM(s) Oral three times a day  heparin  Injectable 5000 Unit(s) SubCutaneous every 8 hours  tamsulosin 0.4 milliGRAM(s) Oral at bedtime  tiotropium 18 MICROgram(s) Capsule 1 Capsule(s) Inhalation daily    MEDICATIONS  (PRN):  acetaminophen   Tablet .. 650 milliGRAM(s) Oral every 6 hours PRN Mild Pain (1 - 3)      DVT Prophylaxis: SQH    Advanced Directives:  Discussed with:    Visit Information:    ** Time is exclusive of billed procedures and/or teaching and/or routine family updates.

## 2019-06-12 NOTE — PROGRESS NOTE ADULT - SUBJECTIVE AND OBJECTIVE BOX
PCP:    REQUESTING PHYSICIAN:    REASON FOR CONSULT:    CHIEF COMPLAINT:    HPI:  Mr. Boone is a 64 year old male with PMHx of EtOH abuse, A-Fib s/p watchman procedure on 19, anemia, CHF, COPD, GERD, cirrhosis, alcohol abuse, HTN, s/p left BKA presented to the ED on 6/10/19 s/p near syncopal episode in his cardiologists office. He had lightheadness and mild abd pain. He was recently discharged from [facility] after having a GI bleed on 19; his last hemoglobin was 8.2 on . He denies recent melena, hematochezia, and CP. While at [facility], pt received 6 units PRBC's, 1 unit platelets and 1 unit plasma, and he had a colonoscopy while admitted showing internal hemorrhoids and a friable ulcer in the cecum. During that hospitalization, he was also noted to have low heart rate on , so he was monitored for another day, remained stable and was discharged.    In the ED, he was noted to be bradycardic and hypotensive  He received atropine x 2, calcium gluconate 1 g, glucagon 10 mg, dopamine, 2 units RBC and IV lorazepam for anxiety/EtOH  In the ED, patient received right sided fem A line. Pulled this out while in the CCU  Per daughter patient can have a bottle of vodka when drinking. His last drink was yesterday (10 Renny 2019 23:35)  19 Pt denies chest pain or shortness of breath. No arrhythmias      PAST MEDICAL & SURGICAL HISTORY:  Chronic CHF  COPD without exacerbation  Atrial fibrillation  Presence of Watchman left atrial appendage closure device  Hypertension  GERD (gastroesophageal reflux disease)  Chronic obstructive pulmonary disease (COPD)  Anemia  Falls  Meningitis  Collapsed lung  Alcohol withdrawal  Emphysema of lung  Cirrhosis  CHF (congestive heart failure)  Poor historian  Alcohol abuse  Chronic atrial fibrillation  Unilateral amputation of lower extremity below knee  Presence of Watchman left atrial appendage closure device  S/P BKA (below knee amputation) unilateral, left      SOCIAL HISTORY:    FAMILY HISTORY:  No pertinent family history in first degree relatives  Family history unknown: Adopted      ALLERGIES:  Allergies    Ceclor (Rash)  Duricef (Rash)    Intolerances        MEDICATIONS:    MEDICATIONS  (STANDING):  ALBUTerol/ipratropium for Nebulization 3 milliLiter(s) Nebulizer every 6 hours  aspirin  chewable 81 milliGRAM(s) Oral daily  clopidogrel Tablet 75 milliGRAM(s) Oral daily  gabapentin 400 milliGRAM(s) Oral three times a day  heparin  Injectable 5000 Unit(s) SubCutaneous every 8 hours  tamsulosin 0.4 milliGRAM(s) Oral at bedtime  tiotropium 18 MICROgram(s) Capsule 1 Capsule(s) Inhalation daily    MEDICATIONS  (PRN):  acetaminophen   Tablet .. 650 milliGRAM(s) Oral every 6 hours PRN Mild Pain (1 - 3)        Vital Signs Last 24 Hrs  T(C): 36.4 (2019 08:45), Max: 36.6 (2019 21:32)  T(F): 97.6 (2019 08:45), Max: 97.9 (2019 21:32)  HR: 95 (2019 09:00) (58 - 95)  BP: 118/60 (2019 09:00) (82/55 - 134/77)  BP(mean): 75 (2019 09:00) (40 - 89)  RR: 25 (2019 09:00) (19 - 31)  SpO2: 96% (2019 09:00) (81% - 100%)Daily     Daily Weight in k.6 (2019 05:00)I&O's Summary    2019 07:  -  2019 07:00  --------------------------------------------------------  IN: 1930 mL / OUT: 3950 mL / NET: -2020 mL    2019 07:01  -  2019 12:43  --------------------------------------------------------  IN: 540 mL / OUT: 650 mL / NET: -110 mL        PHYSICAL EXAM:    Constitutional: NAD, awake and alert, well-developed  HEENT: PERR, EOMI,  No oral cyananosis.  Neck:  supple,  No JVD  Respiratory: Breath sounds are clear bilaterally, No wheezing, rales or rhonchi  Cardiovascular: S1 and S2, irregular  Gastrointestinal: Bowel Sounds present, soft, nontender.   Extremities: Left amputation  Vascular: 2+ peripheral pulses  Neurological: A/O x 3, no focal deficits  Musculoskeletal: no calf tenderness.  Skin: No rashes.      LABS: All Labs Reviewed:                        8.1    5.46  )-----------( 169      ( 2019 06:08 )             25.8                         10.2   9.28  )-----------( 222      ( 2019 05:39 )             32.1                         7.2    7.50  )-----------( 210      ( 10 Renny 2019 18:50 )             23.1     2019 06:08    139    |  108    |  9      ----------------------------<  100    4.1     |  27     |  0.85   2019 05:39    141    |  110    |  14     ----------------------------<  116    4.1     |  25     |  0.95   10 Renny 2019 18:50    137    |  105    |  17     ----------------------------<  149    4.5     |  25     |  1.33     Ca    8.4        2019 06:08  Ca    8.9        2019 05:39  Ca    8.7        10 Renny 2019 18:50  Phos  3.3       2019 06:08  Mg     1.7       2019 06:08  Mg     1.6       10 Renny 2019 18:50    TPro  6.2    /  Alb  2.7    /  TBili  0.5    /  DBili  x      /  AST  22     /  ALT  21     /  AlkPhos  102    10 Renny 2019 18:50    PT/INR - ( 10 Renny 2019 18:50 )   PT: 13.1 sec;   INR: 1.17 ratio         PTT - ( 10 Renny 2019 18:50 )  PTT:26.2 sec  CARDIAC MARKERS ( 2019 01:25 )  <0.015 ng/mL / x     / x     / x     / x      CARDIAC MARKERS ( 10 Renny 2019 18:50 )  <0.015 ng/mL / x     / x     / x     / x          Blood Culture:         RADIOLOGY/EKG:< from: 12 Lead ECG (06.10.19 @ 20:48) >  Diagnosis Line Atrial fibrillation  Septal infarct (cited on or before 17-AUG-2017)  Abnormal ECG  When compared with ECG of 10-RENNY-2019 18:29,  QT has lengthened  Confirmed by FLOYD SMITH, DAMEON QUESADA ([id number]) on 2019 2:09:54 PM    < end of copied text >        ECHO/CARDIAC CATHTERIZATION/STRESS TEST:< from: Transthoracic Echocardiogram (06.10.19 @ 20:49) >     Impression     Summary     Estimated left ventricular ejection fraction is 60-65 %.   The left ventricle is normal in wall thickness, wall motion and   contractility as seen in limited views.   The left ventricle cavity is moderately dilated.   The left atrium is severely dilated.   The right atrium appears severely dilated.   Normal appearing right ventricle structure and function.   Normal aortic valve structure and function.   The mitral valve leaflets appear thin and normal.   Moderate (2+) mitral regurgitation is present.   Normal appearing tricuspid valve structure.   Mild to Moderate Tricuspid regurgitation is present.   Pulmonic valve not well seen.   No evidence of pericardial effusion.   No evidence of pleural effusion.   IVC is dilated and not collapsing with inspiration.     Signature     ----------------------------------------------------------------   Electronically signed by Lon Hein MD(Interpreting    < end of copied text >

## 2019-06-12 NOTE — PROGRESS NOTE ADULT - RS GEN PE MLT RESP DETAILS PC
breath sounds equal/no wheezes/no rales/good air movement/no rhonchi
no wheezes/no rhonchi/no rales/breath sounds equal

## 2019-06-12 NOTE — CHART NOTE - NSCHARTNOTEFT_GEN_A_CORE
This 64 year old male who might have accidently took extra AV Blocking agents, with anemia found to be hypotensive and bradycardic.  He is now off his Dopamine drip and stable from EP Point of view.   He was recently admitted to [facility] where he received  PC as well.    PAST MEDICAL & SURGICAL HISTORY:  Chronic CHF  COPD without exacerbation  Atrial fibrillation  Presence of Watchman left atrial appendage closure device  Hypertension  GERD (gastroesophageal reflux disease)  Chronic obstructive pulmonary disease (COPD)  Anemia  Falls  Meningitis  Collapsed lung  Alcohol withdrawal  Emphysema of lung  Cirrhosis  CHF (congestive heart failure)  Poor historian  Alcohol abuse  Chronic atrial fibrillation  Unilateral amputation of lower extremity below knee  Presence of Watchman left atrial appendage closure device  S/P BKA (below knee amputation) unilateral, left       EP will arrange for out pt monitor and f/u.  Ep will see this pt PRN as he is stable.

## 2019-06-12 NOTE — CHART NOTE - NSCHARTNOTEFT_GEN_A_CORE
Called by the staff for this pt who was found to be in At Fib with RVR.  BP systolic 130 .  AV Blocking agents have been held.  Will resume Cardizem 60 TID and continue to monitor this pt.  Pt Pt will need MCOT upon discharge.  Arraignments through our office will be made.

## 2019-06-12 NOTE — PROGRESS NOTE ADULT - ASSESSMENT
64 year old male with PMHx of EtOH abuse, A-Fib s/p watchman procedure on 4/2/19, anemia, CHF, COPD, GERD, cirrhosis, alcohol abuse, HTN, s/p left BKA presented to the ED on 6/10/19 s/p near syncopal episode in his cardiologists office found to have hypotension and bradycardia, unclear etiology      Plan:  CCU    PULM: Current Smoker, continue BPDs    Cardio:  EP consult noted, CCB and BBx D/C for now.  Continue ASA and Plavix.  Cardio will resume rate controling meds    Renal: Was in [place]KA likely from ATN.  Cr now improved    GI: Hx Cirrhosis and LGIB.  Guaiac negative in ED. Repeat Guaiac PND.  S/P 2 U PRBC    PSY: ETOH abuse.  Watch for withdrawal.    DVT prophylaxis SQH    Transfer to reg floor  D/W Hospitalist

## 2019-06-13 LAB
ALBUMIN SERPL ELPH-MCNC: 2.6 G/DL — LOW (ref 3.3–5)
ALP SERPL-CCNC: 98 U/L — SIGNIFICANT CHANGE UP (ref 40–120)
ALT FLD-CCNC: 13 U/L — SIGNIFICANT CHANGE UP (ref 12–78)
ANION GAP SERPL CALC-SCNC: 5 MMOL/L — SIGNIFICANT CHANGE UP (ref 5–17)
ANION GAP SERPL CALC-SCNC: 5 MMOL/L — SIGNIFICANT CHANGE UP (ref 5–17)
AST SERPL-CCNC: 15 U/L — SIGNIFICANT CHANGE UP (ref 15–37)
BILIRUB DIRECT SERPL-MCNC: 0.1 MG/DL — SIGNIFICANT CHANGE UP (ref 0–0.2)
BILIRUB INDIRECT FLD-MCNC: 0.5 MG/DL — SIGNIFICANT CHANGE UP (ref 0.2–1)
BILIRUB SERPL-MCNC: 0.6 MG/DL — SIGNIFICANT CHANGE UP (ref 0.2–1.2)
BUN SERPL-MCNC: 11 MG/DL — SIGNIFICANT CHANGE UP (ref 7–23)
BUN SERPL-MCNC: 9 MG/DL — SIGNIFICANT CHANGE UP (ref 7–23)
CALCIUM SERPL-MCNC: 8.5 MG/DL — SIGNIFICANT CHANGE UP (ref 8.5–10.1)
CALCIUM SERPL-MCNC: 8.8 MG/DL — SIGNIFICANT CHANGE UP (ref 8.5–10.1)
CHLORIDE SERPL-SCNC: 100 MMOL/L — SIGNIFICANT CHANGE UP (ref 96–108)
CHLORIDE SERPL-SCNC: 105 MMOL/L — SIGNIFICANT CHANGE UP (ref 96–108)
CO2 SERPL-SCNC: 27 MMOL/L — SIGNIFICANT CHANGE UP (ref 22–31)
CO2 SERPL-SCNC: 32 MMOL/L — HIGH (ref 22–31)
CREAT SERPL-MCNC: 0.77 MG/DL — SIGNIFICANT CHANGE UP (ref 0.5–1.3)
CREAT SERPL-MCNC: 0.86 MG/DL — SIGNIFICANT CHANGE UP (ref 0.5–1.3)
GLUCOSE SERPL-MCNC: 100 MG/DL — HIGH (ref 70–99)
GLUCOSE SERPL-MCNC: 92 MG/DL — SIGNIFICANT CHANGE UP (ref 70–99)
HCT VFR BLD CALC: 27.8 % — LOW (ref 39–50)
HGB BLD-MCNC: 8.8 G/DL — LOW (ref 13–17)
MAGNESIUM SERPL-MCNC: 1.6 MG/DL — SIGNIFICANT CHANGE UP (ref 1.6–2.6)
MAGNESIUM SERPL-MCNC: 1.6 MG/DL — SIGNIFICANT CHANGE UP (ref 1.6–2.6)
MCHC RBC-ENTMCNC: 30.4 PG — SIGNIFICANT CHANGE UP (ref 27–34)
MCHC RBC-ENTMCNC: 31.7 GM/DL — LOW (ref 32–36)
MCV RBC AUTO: 96.2 FL — SIGNIFICANT CHANGE UP (ref 80–100)
PHOSPHATE SERPL-MCNC: 3.1 MG/DL — SIGNIFICANT CHANGE UP (ref 2.5–4.5)
PHOSPHATE SERPL-MCNC: 4.1 MG/DL — SIGNIFICANT CHANGE UP (ref 2.5–4.5)
PLATELET # BLD AUTO: 160 K/UL — SIGNIFICANT CHANGE UP (ref 150–400)
POTASSIUM SERPL-MCNC: 3.8 MMOL/L — SIGNIFICANT CHANGE UP (ref 3.5–5.3)
POTASSIUM SERPL-MCNC: 4.4 MMOL/L — SIGNIFICANT CHANGE UP (ref 3.5–5.3)
POTASSIUM SERPL-SCNC: 3.8 MMOL/L — SIGNIFICANT CHANGE UP (ref 3.5–5.3)
POTASSIUM SERPL-SCNC: 4.4 MMOL/L — SIGNIFICANT CHANGE UP (ref 3.5–5.3)
PROT SERPL-MCNC: 6.4 GM/DL — SIGNIFICANT CHANGE UP (ref 6–8.3)
RBC # BLD: 2.89 M/UL — LOW (ref 4.2–5.8)
RBC # FLD: 17.4 % — HIGH (ref 10.3–14.5)
SODIUM SERPL-SCNC: 137 MMOL/L — SIGNIFICANT CHANGE UP (ref 135–145)
SODIUM SERPL-SCNC: 137 MMOL/L — SIGNIFICANT CHANGE UP (ref 135–145)
WBC # BLD: 5.58 K/UL — SIGNIFICANT CHANGE UP (ref 3.8–10.5)
WBC # FLD AUTO: 5.58 K/UL — SIGNIFICANT CHANGE UP (ref 3.8–10.5)

## 2019-06-13 RX ORDER — PANTOPRAZOLE SODIUM 20 MG/1
40 TABLET, DELAYED RELEASE ORAL
Refills: 0 | Status: DISCONTINUED | OUTPATIENT
Start: 2019-06-13 | End: 2019-06-17

## 2019-06-13 RX ORDER — TRAZODONE HCL 50 MG
50 TABLET ORAL AT BEDTIME
Refills: 0 | Status: DISCONTINUED | OUTPATIENT
Start: 2019-06-13 | End: 2019-06-17

## 2019-06-13 RX ORDER — FOLIC ACID 0.8 MG
1 TABLET ORAL DAILY
Refills: 0 | Status: DISCONTINUED | OUTPATIENT
Start: 2019-06-13 | End: 2019-06-17

## 2019-06-13 RX ORDER — THIAMINE MONONITRATE (VIT B1) 100 MG
100 TABLET ORAL DAILY
Refills: 0 | Status: COMPLETED | OUTPATIENT
Start: 2019-06-13 | End: 2019-06-16

## 2019-06-13 RX ADMIN — Medication 60 MILLIGRAM(S): at 06:48

## 2019-06-13 RX ADMIN — Medication 50 MILLIGRAM(S): at 23:54

## 2019-06-13 RX ADMIN — Medication 60 MILLIGRAM(S): at 14:27

## 2019-06-13 RX ADMIN — GABAPENTIN 400 MILLIGRAM(S): 400 CAPSULE ORAL at 14:27

## 2019-06-13 RX ADMIN — HEPARIN SODIUM 5000 UNIT(S): 5000 INJECTION INTRAVENOUS; SUBCUTANEOUS at 21:29

## 2019-06-13 RX ADMIN — TAMSULOSIN HYDROCHLORIDE 0.4 MILLIGRAM(S): 0.4 CAPSULE ORAL at 21:29

## 2019-06-13 RX ADMIN — TIOTROPIUM BROMIDE 1 CAPSULE(S): 18 CAPSULE ORAL; RESPIRATORY (INHALATION) at 08:39

## 2019-06-13 RX ADMIN — OXYCODONE HYDROCHLORIDE 5 MILLIGRAM(S): 5 TABLET ORAL at 10:55

## 2019-06-13 RX ADMIN — HEPARIN SODIUM 5000 UNIT(S): 5000 INJECTION INTRAVENOUS; SUBCUTANEOUS at 14:27

## 2019-06-13 RX ADMIN — Medication 3 MILLILITER(S): at 08:38

## 2019-06-13 RX ADMIN — OXYCODONE HYDROCHLORIDE 5 MILLIGRAM(S): 5 TABLET ORAL at 21:31

## 2019-06-13 RX ADMIN — Medication 100 MILLIGRAM(S): at 21:29

## 2019-06-13 RX ADMIN — Medication 3 MILLILITER(S): at 14:59

## 2019-06-13 RX ADMIN — CLOPIDOGREL BISULFATE 75 MILLIGRAM(S): 75 TABLET, FILM COATED ORAL at 12:00

## 2019-06-13 RX ADMIN — HEPARIN SODIUM 5000 UNIT(S): 5000 INJECTION INTRAVENOUS; SUBCUTANEOUS at 06:48

## 2019-06-13 RX ADMIN — Medication 81 MILLIGRAM(S): at 12:00

## 2019-06-13 RX ADMIN — Medication 3 MILLILITER(S): at 20:32

## 2019-06-13 RX ADMIN — GABAPENTIN 400 MILLIGRAM(S): 400 CAPSULE ORAL at 21:29

## 2019-06-13 RX ADMIN — Medication 1 TABLET(S): at 17:59

## 2019-06-13 RX ADMIN — Medication 60 MILLIGRAM(S): at 21:29

## 2019-06-13 RX ADMIN — GABAPENTIN 400 MILLIGRAM(S): 400 CAPSULE ORAL at 06:48

## 2019-06-13 RX ADMIN — Medication 1 MILLIGRAM(S): at 17:59

## 2019-06-13 RX ADMIN — Medication 3 MILLILITER(S): at 03:01

## 2019-06-13 RX ADMIN — OXYCODONE HYDROCHLORIDE 5 MILLIGRAM(S): 5 TABLET ORAL at 22:40

## 2019-06-13 NOTE — PROGRESS NOTE ADULT - ASSESSMENT
1. Atrial fibrillation w/ RVR, s/p Watchman  -patient restarted on cardizem 60mg q8h  -can uptitrate CCB as BP allows for uncontrolled VR  -monitor electrolytes, maintain K~4, Mg~2  -patient appears euvolemic, monitor volume status  -patient will need cardiac monitor on discharge 1. Atrial fibrillation w/ RVR, s/p Watchman  -VR improving  -patient restarted on cardizem 60mg q8h, can uptitrate as BP allows  -monitor electrolytes, maintain K~4, Mg~2  -patient appears euvolemic, monitor volume status  -patient will need cardiac monitor on discharge

## 2019-06-13 NOTE — PROGRESS NOTE ADULT - SUBJECTIVE AND OBJECTIVE BOX
HOSPITALIST PROGRESS NOTE:  SUBJECTIVE:  PCP:   Chief Complaint: Patient is a 64y old  Male who presents with a chief complaint of Hypotension, bradycardia (13 Jun 2019 10:13)      HPI:  Mr. Boone is a 64 year old male with PMHx of EtOH abuse, A-Fib s/p watchman procedure on 4/2/19, anemia, CHF, COPD, GERD, cirrhosis, alcohol abuse, HTN, s/p left BKA presented to the ED on 6/10/19 s/p near syncopal episode in his cardiologists office. He had lightheadness and mild abd pain. He was recently discharged from [place] after having a GI bleed on 5/26/19; his last hemoglobin was 8.2 on 6/1. He denies recent melena, hematochezia, and CP. While at [facility], pt received 6 units PRBC's, 1 unit platelets and 1 unit plasma, and he had a colonoscopy while admitted showing internal hemorrhoids and a friable ulcer in the cecum. During that hospitalization, he was also noted to have low heart rate on 5/31, so he was monitored for another day, remained stable and was discharged.  In the ED, he was noted to be bradycardic and hypotensive  He received atropine x 2, calcium gluconate 1 g, glucagon 10 mg, dopamine, 2 units RBC and IV lorazepam for anxiety/EtOH  In the ED, patient received right sided fem A line. Pulled this out while in the CCU  Per daughter patient can have a bottle of vodka when drinking. His last drink was yesterday (10 Jackson 2019 23:35)    6/13: Above reviewed; patient has no complaints; denies any blood in his stool or abd pain; States that he has not drank alcohol for 2 months; yesterday patient went into AFIB with RVR and EP restarted Diltiazem       Allergies:  Ceclor (Rash)  Duricef (Rash)    REVIEW OF SYSTEMS:  See HPI. All other review of systems is negative unless indicated above.     OBJECTIVE  Physical Exam:  Vital Signs:    Vital Signs Last 24 Hrs  T(C): 36.8 (13 Jun 2019 10:25), Max: 36.8 (13 Jun 2019 10:25)  T(F): 98.3 (13 Jun 2019 10:25), Max: 98.3 (13 Jun 2019 10:25)  HR: 83 (13 Jun 2019 10:25) (83 - 105)  BP: 133/66 (13 Jun 2019 10:25) (124/62 - 145/72)  BP(mean): 89 (12 Jun 2019 20:00) (89 - 89)  RR: 17 (13 Jun 2019 10:25) (17 - 23)  SpO2: 95% (13 Jun 2019 10:25) (95% - 99%)  I&O's Summary    12 Jun 2019 07:01  -  13 Jun 2019 07:00  --------------------------------------------------------  IN: 1509 mL / OUT: 3775 mL / NET: -2266 mL    13 Jun 2019 07:01  -  13 Jun 2019 16:08  --------------------------------------------------------  IN: 0 mL / OUT: 300 mL / NET: -300 mL        Constitutional: NAD, awake and alert, well-developed  Neurological: AAO x 3, no focal deficits  HEENT: PERRLA, EOMI, MMM  Neck: Soft and supple, No LAD, No JVD  Respiratory: Breath sounds are clear bilaterally, No wheezing, rales or rhonchi  Cardiovascular: S1 and S2, regular rate and rhythm; no Murmurs, gallops or rubs  Gastrointestinal: Bowel Sounds present, soft, nontender, nondistended, no guarding, no rebound tenderness  Back: No CVA tenderness   Extremities: No peripheral edema; Left BKA  Vascular: 2+ peripheral pulses  Musculoskeletal: 5/5 strength b/l upper and lower extremities  Skin: No rashes  Breast: Deferred  Rectal: Deferred    MEDICATIONS  (STANDING):  ALBUTerol/ipratropium for Nebulization 3 milliLiter(s) Nebulizer every 6 hours  aspirin  chewable 81 milliGRAM(s) Oral daily  clopidogrel Tablet 75 milliGRAM(s) Oral daily  diltiazem    Tablet 60 milliGRAM(s) Oral three times a day  gabapentin 400 milliGRAM(s) Oral three times a day  heparin  Injectable 5000 Unit(s) SubCutaneous every 8 hours  tamsulosin 0.4 milliGRAM(s) Oral at bedtime  tiotropium 18 MICROgram(s) Capsule 1 Capsule(s) Inhalation daily      LABS: All Labs Reviewed:                        8.8    5.58  )-----------( 160      ( 13 Jun 2019 07:42 )             27.8     06-13    137  |  105  |  9   ----------------------------<  100<H>  3.8   |  27  |  0.77    Ca    8.5      13 Jun 2019 07:42  Phos  3.1     06-13  Mg     1.6     06-13        RADIOLOGY/EKG:    < from: Xray Chest 1 View- PORTABLE-Urgent (06.10.19 @ 19:32) >    Pression:    Cardiomegaly. No evidence of acute cardiopulmonary disease.    < end of copied text >

## 2019-06-13 NOTE — PROGRESS NOTE ADULT - ASSESSMENT
*Bradycardia unsure of Etiology  -transfer from ICU s/P Dopamine Drip  -EP consult noted   -Initially CCB and BBx was D/C; restarted Diltiazem 60TID  due to AFIB with RVR  -Continue ASA and Plavix  -EP will arrange for out pt monitor and f/u.; Pt will need MCOT upon discharge.    *ANUSHKA likely from ATN.    -Cr now improved  -continue to monitor     *Anemia of blood Loss   *Hx Cirrhosis and LGIB.  -S/P 2 Units of PRBC  -FU repeat Occult Blood  -GI consult     *Atrial fibrillation  -No AC as watchman in place.   -restart Diltiazem 60mg TID      *Alcohol withdrawal  -continue CIWA protocol with ativan PRN    *DVT ppx

## 2019-06-13 NOTE — PROGRESS NOTE ADULT - SUBJECTIVE AND OBJECTIVE BOX
CARDIOLOGY PROGRESS NOTE   ([facility])                             Reason for follow up:                             Overnight: No new events.       Subjective: Patient seen and examined at bedside this AM. Restarted on cardizem yesterday. Patient denies any acute cardiac complaints,. Denies CP, SOB, dizziness, palpitations.     HPI: 64 year old male admitted for near syncope at his cardiologists office. He was found to be hypotensive  and bradycardic.  He received two unites of PC in ED.  The cardiac monitor shows AT Fib  frequent episodes of Bradycardia till approximately 5:13 am.  He is now on a Dopamine drip and is in AT Fib with -115 BPM.  He ia alert and appears to be comfortable. In the ED he had received AS 2 mgs, Glucagon and Ca Gluconate. He has a present history of alcohol abuse. He pulled out an arterial line while in the CCU this admission.  He had a watchman implant from 4/19 of this year. On 5/29/2019 he was admitted to [facility] where 2 also received blood, he is noted to have a ulcer in his  cecum.  During that hospitalization he was noted to be bradycardic as well.     PAST MEDICAL & SURGICAL HISTORY:  Chronic CHF  COPD without exacerbation  Atrial fibrillation  Presence of Watchman left atrial appendage closure device  Hypertension  GERD (gastroesophageal reflux disease)  Chronic obstructive pulmonary disease (COPD)  Anemia  Falls  Meningitis  Collapsed lung  Alcohol withdrawal  Emphysema of lung  Cirrhosis  CHF (congestive heart failure)  Poor historian  Alcohol abuse  Chronic atrial fibrillation  Unilateral amputation of lower extremity below knee  Presence of Watchman left atrial appendage closure device  S/P BKA (below knee amputation) unilateral, left  	  Vitals:  T(C): 36.5 (06-13-19 @ 04:49), Max: 36.6 (06-12-19 @ 20:28)  HR: 100 (06-13-19 @ 04:49) (86 - 135)  BP: 127/67 (06-13-19 @ 04:49) (124/62 - 145/72)  RR: 20 (06-12-19 @ 20:28) (20 - 29)  SpO2: 97% (06-13-19 @ 04:49) (89% - 99%)  Wt(kg): --  I&O's Summary    12 Jun 2019 07:01  -  13 Jun 2019 07:00  --------------------------------------------------------  IN: 1509 mL / OUT: 3775 mL / NET: -2266 mL    13 Jun 2019 07:01  -  13 Jun 2019 10:13  --------------------------------------------------------  IN: 0 mL / OUT: 300 mL / NET: -300 mL      Weight (kg): 112 (06-10 @ 18:21)    PHYSICAL EXAM:  Appearance: Comfortable. No acute distress  HEENT:  Head and neck: Atraumatic. Normocephalic.  Normal oral mucosa  Neck: Supple, No JVD, no carotid bruit  Neurologic: A & O x 3, no focal deficits  Lymphatic: No cervical lymphadenopathy  Cardiovascular: Irregularly irregular. No rubs/gallops  Respiratory: Lungs clear to auscultation  Gastrointestinal:  Soft, Non-tender, + BS  Lower Extremities: No edema, Left BKA  Psychiatry: Patient is calm. No agitation. Mood & affect appropriate  Skin: No rashes, No ecchymoses, No cyanosis    CURRENT MEDICATIONS:  diltiazem    Tablet 60 milliGRAM(s) Oral three times a day  tamsulosin 0.4 milliGRAM(s) Oral at bedtime    ALBUTerol/ipratropium for Nebulization  tiotropium 18 MICROgram(s) Capsule  gabapentin  aspirin  chewable  clopidogrel Tablet  heparin  Injectable    Allergies  Ceclor (Rash)  Cjicef (Rash)    SOCIAL HISTORY: Denies tobacco, illicit drug use.  He is known to have alcohol abuse.  He states his daughters help him with his medications    FAMILY HISTORY:  No pertinent family history in first degree relatives  Family history unknown: Adopted      LABS:	 	  CARDIAC MARKERS:  Troponin I: <0.015 x 2                          8.8    5.58  )-----------( 160      ( 13 Jun 2019 07:42 )             27.8     06-13    137  |  105  |  9   ----------------------------<  100<H>  3.8   |  27  |  0.77    Ca    8.5      13 Jun 2019 07:42  Phos  3.1     06-13  Mg     1.6     06-13      TELEMETRY: atrial fib  ECG:  atrial fib, septal q waves    DIAGNOSTIC TESTING:  Echocardiogram 6/10/19:  Estimated left ventricular ejection fraction is 60-65%.The left ventricle is normal in wall thickness, wall motion and contractility as seen in limited views. The left ventricle cavity is moderately dilated. The left atrium is severely dilated. The right atrium appears severely dilated. Normal appearing right ventricle structure and function. Normal aortic valve structure and function. The mitral valve leaflets appear thin and normal. Moderate (2+) mitral regurgitation is present. Normal appearing tricuspid valve structure. Mild to Moderate Tricuspid regurgitation is present. Pulmonic valve not well seen. No evidence of pericardial effusion. No evidence of pleural effusion. IVC is dilated and not collapsing with inspiration. CARDIOLOGY PROGRESS NOTE   ([facility])                             Reason for follow up:                             Overnight: No new events.       Subjective: Patient seen and examined at bedside this AM. Restarted on cardizem yesterday. Patient denies any acute cardiac complaints,. Denies CP, SOB, dizziness, palpitations.     HPI: 64 year old male admitted for near syncope at his cardiologists office. He was found to be hypotensive  and bradycardic.  He received two unites of PC in ED.  The cardiac monitor shows AT Fib  frequent episodes of Bradycardia till approximately 5:13 am.  He is now on a Dopamine drip and is in AT Fib with -115 BPM.  He ia alert and appears to be comfortable. In the ED he had received AS 2 mgs, Glucagon and Ca Gluconate. He has a present history of alcohol abuse. He pulled out an arterial line while in the CCU this admission.  He had a watchman implant from 4/19 of this year. On 5/29/2019 he was admitted to [facility] where 2 also received blood, he is noted to have a ulcer in his  cecum.  During that hospitalization he was noted to be bradycardic as well.     PAST MEDICAL & SURGICAL HISTORY:  Chronic CHF  COPD without exacerbation  Atrial fibrillation  Presence of Watchman left atrial appendage closure device  Hypertension  GERD (gastroesophageal reflux disease)  Chronic obstructive pulmonary disease (COPD)  Anemia  Falls  Meningitis  Collapsed lung  Alcohol withdrawal  Emphysema of lung  Cirrhosis  CHF (congestive heart failure)  Poor historian  Alcohol abuse  Chronic atrial fibrillation  Unilateral amputation of lower extremity below knee  Presence of Watchman left atrial appendage closure device  S/P BKA (below knee amputation) unilateral, left  	  Vitals:  T(C): 36.5 (06-13-19 @ 04:49), Max: 36.6 (06-12-19 @ 20:28)  HR: 100 (06-13-19 @ 04:49) (86 - 135)  BP: 127/67 (06-13-19 @ 04:49) (124/62 - 145/72)  RR: 20 (06-12-19 @ 20:28) (20 - 29)  SpO2: 97% (06-13-19 @ 04:49) (89% - 99%)  Wt(kg): --  I&O's Summary    12 Jun 2019 07:01  -  13 Jun 2019 07:00  --------------------------------------------------------  IN: 1509 mL / OUT: 3775 mL / NET: -2266 mL    13 Jun 2019 07:01  -  13 Jun 2019 10:13  --------------------------------------------------------  IN: 0 mL / OUT: 300 mL / NET: -300 mL      Weight (kg): 112 (06-10 @ 18:21)    PHYSICAL EXAM:  Appearance: Comfortable. No acute distress  HEENT:  Head and neck: Atraumatic. Normocephalic.  Normal oral mucosa  Neck: Supple, No JVD, no carotid bruit  Neurologic: A & O x 3, no focal deficits  Lymphatic: No cervical lymphadenopathy  Cardiovascular: Irregularly irregular. No rubs/gallops  Respiratory: Lungs clear to auscultation  Gastrointestinal:  Soft, Non-tender, + BS  Lower Extremities: No edema, Left BKA  Psychiatry: Patient is calm. No agitation. Mood & affect appropriate  Skin: No rashes, No ecchymoses, No cyanosis    CURRENT MEDICATIONS:  diltiazem    Tablet 60 milliGRAM(s) Oral three times a day  tamsulosin 0.4 milliGRAM(s) Oral at bedtime    ALBUTerol/ipratropium for Nebulization  tiotropium 18 MICROgram(s) Capsule  gabapentin  aspirin  chewable  clopidogrel Tablet  heparin  Injectable    Allergies  Ceclor (Rash)  Cjicef (Rash)    SOCIAL HISTORY: Denies tobacco, illicit drug use.  He is known to have alcohol abuse.  He states his daughters help him with his medications    FAMILY HISTORY:  No pertinent family history in first degree relatives  Family history unknown: Adopted      LABS:	 	  CARDIAC MARKERS:  Troponin I: <0.015 x 2                          8.8    5.58  )-----------( 160      ( 13 Jun 2019 07:42 )             27.8     06-13    137  |  105  |  9   ----------------------------<  100<H>  3.8   |  27  |  0.77    Ca    8.5      13 Jun 2019 07:42  Phos  3.1     06-13  Mg     1.6     06-13      TELEMETRY: atrial fib, HR 80s-110  ECG:  atrial fib, septal q waves    DIAGNOSTIC TESTING:  Echocardiogram 6/10/19:  Estimated left ventricular ejection fraction is 60-65%.The left ventricle is normal in wall thickness, wall motion and contractility as seen in limited views. The left ventricle cavity is moderately dilated. The left atrium is severely dilated. The right atrium appears severely dilated. Normal appearing right ventricle structure and function. Normal aortic valve structure and function. The mitral valve leaflets appear thin and normal. Moderate (2+) mitral regurgitation is present. Normal appearing tricuspid valve structure. Mild to Moderate Tricuspid regurgitation is present. Pulmonic valve not well seen. No evidence of pericardial effusion. No evidence of pleural effusion. IVC is dilated and not collapsing with inspiration.

## 2019-06-14 LAB
ANION GAP SERPL CALC-SCNC: 7 MMOL/L — SIGNIFICANT CHANGE UP (ref 5–17)
BUN SERPL-MCNC: 9 MG/DL — SIGNIFICANT CHANGE UP (ref 7–23)
CALCIUM SERPL-MCNC: 8.9 MG/DL — SIGNIFICANT CHANGE UP (ref 8.5–10.1)
CHLORIDE SERPL-SCNC: 102 MMOL/L — SIGNIFICANT CHANGE UP (ref 96–108)
CO2 SERPL-SCNC: 28 MMOL/L — SIGNIFICANT CHANGE UP (ref 22–31)
CREAT SERPL-MCNC: 0.73 MG/DL — SIGNIFICANT CHANGE UP (ref 0.5–1.3)
GLUCOSE SERPL-MCNC: 96 MG/DL — SIGNIFICANT CHANGE UP (ref 70–99)
HCT VFR BLD CALC: 27.8 % — LOW (ref 39–50)
HGB BLD-MCNC: 8.9 G/DL — LOW (ref 13–17)
MAGNESIUM SERPL-MCNC: 1.8 MG/DL — SIGNIFICANT CHANGE UP (ref 1.6–2.6)
MCHC RBC-ENTMCNC: 30.4 PG — SIGNIFICANT CHANGE UP (ref 27–34)
MCHC RBC-ENTMCNC: 32 GM/DL — SIGNIFICANT CHANGE UP (ref 32–36)
MCV RBC AUTO: 94.9 FL — SIGNIFICANT CHANGE UP (ref 80–100)
PHOSPHATE SERPL-MCNC: 4.3 MG/DL — SIGNIFICANT CHANGE UP (ref 2.5–4.5)
PLATELET # BLD AUTO: 163 K/UL — SIGNIFICANT CHANGE UP (ref 150–400)
POTASSIUM SERPL-MCNC: 4.1 MMOL/L — SIGNIFICANT CHANGE UP (ref 3.5–5.3)
POTASSIUM SERPL-SCNC: 4.1 MMOL/L — SIGNIFICANT CHANGE UP (ref 3.5–5.3)
RBC # BLD: 2.93 M/UL — LOW (ref 4.2–5.8)
RBC # FLD: 17.3 % — HIGH (ref 10.3–14.5)
SODIUM SERPL-SCNC: 137 MMOL/L — SIGNIFICANT CHANGE UP (ref 135–145)
WBC # BLD: 6.26 K/UL — SIGNIFICANT CHANGE UP (ref 3.8–10.5)
WBC # FLD AUTO: 6.26 K/UL — SIGNIFICANT CHANGE UP (ref 3.8–10.5)

## 2019-06-14 RX ORDER — DILTIAZEM HCL 120 MG
90 CAPSULE, EXT RELEASE 24 HR ORAL THREE TIMES A DAY
Refills: 0 | Status: DISCONTINUED | OUTPATIENT
Start: 2019-06-14 | End: 2019-06-17

## 2019-06-14 RX ADMIN — HEPARIN SODIUM 5000 UNIT(S): 5000 INJECTION INTRAVENOUS; SUBCUTANEOUS at 14:13

## 2019-06-14 RX ADMIN — CLOPIDOGREL BISULFATE 75 MILLIGRAM(S): 75 TABLET, FILM COATED ORAL at 12:38

## 2019-06-14 RX ADMIN — HEPARIN SODIUM 5000 UNIT(S): 5000 INJECTION INTRAVENOUS; SUBCUTANEOUS at 05:59

## 2019-06-14 RX ADMIN — GABAPENTIN 400 MILLIGRAM(S): 400 CAPSULE ORAL at 21:20

## 2019-06-14 RX ADMIN — Medication 3 MILLILITER(S): at 15:02

## 2019-06-14 RX ADMIN — Medication 90 MILLIGRAM(S): at 21:21

## 2019-06-14 RX ADMIN — Medication 1 MILLIGRAM(S): at 12:38

## 2019-06-14 RX ADMIN — Medication 3 MILLILITER(S): at 01:59

## 2019-06-14 RX ADMIN — Medication 60 MILLIGRAM(S): at 14:13

## 2019-06-14 RX ADMIN — PANTOPRAZOLE SODIUM 40 MILLIGRAM(S): 20 TABLET, DELAYED RELEASE ORAL at 05:59

## 2019-06-14 RX ADMIN — Medication 1 TABLET(S): at 12:38

## 2019-06-14 RX ADMIN — OXYCODONE HYDROCHLORIDE 5 MILLIGRAM(S): 5 TABLET ORAL at 14:00

## 2019-06-14 RX ADMIN — Medication 3 MILLILITER(S): at 09:23

## 2019-06-14 RX ADMIN — OXYCODONE HYDROCHLORIDE 5 MILLIGRAM(S): 5 TABLET ORAL at 12:37

## 2019-06-14 RX ADMIN — GABAPENTIN 400 MILLIGRAM(S): 400 CAPSULE ORAL at 05:59

## 2019-06-14 RX ADMIN — GABAPENTIN 400 MILLIGRAM(S): 400 CAPSULE ORAL at 14:13

## 2019-06-14 RX ADMIN — TIOTROPIUM BROMIDE 1 CAPSULE(S): 18 CAPSULE ORAL; RESPIRATORY (INHALATION) at 09:23

## 2019-06-14 RX ADMIN — OXYCODONE HYDROCHLORIDE 5 MILLIGRAM(S): 5 TABLET ORAL at 21:20

## 2019-06-14 RX ADMIN — TAMSULOSIN HYDROCHLORIDE 0.4 MILLIGRAM(S): 0.4 CAPSULE ORAL at 21:20

## 2019-06-14 RX ADMIN — OXYCODONE HYDROCHLORIDE 5 MILLIGRAM(S): 5 TABLET ORAL at 21:21

## 2019-06-14 RX ADMIN — Medication 100 MILLIGRAM(S): at 12:33

## 2019-06-14 RX ADMIN — Medication 81 MILLIGRAM(S): at 12:38

## 2019-06-14 RX ADMIN — HEPARIN SODIUM 5000 UNIT(S): 5000 INJECTION INTRAVENOUS; SUBCUTANEOUS at 21:21

## 2019-06-14 RX ADMIN — Medication 60 MILLIGRAM(S): at 05:59

## 2019-06-14 NOTE — PROGRESS NOTE ADULT - ASSESSMENT
*Bradycardia unsure of Etiology  -transfer from ICU S/P Dopamine Drip  -EP consult appreciated  -Initially CCB and BBx was D/C;   restarted and increase Diltiazem 90TID  due to AFIB with RVR  -Continue ASA and Plavix  -EP will arrange for out pt monitor and f/u.; Pt will need MCOT upon discharge.    *ANUSHKA likely from ATN.    -Cr now improved  -continue to monitor     *Anemia of blood Loss   *Hx Cirrhosis and LGIB most likely 2ndry to right-sided ischemic colitis  -S/P 2 Units of PRBC  -FU repeat Occult Blood  -GI consult appreciated - should have colonoscopy in 1-2 months to document resolution of cecal process and rule out malignancy; consider screening EGD given history of cirrhosis, rule out varices and portal gastropathy;    *Atrial fibrillation  -No AC as watchman in place.   -restart Diltiazem 90mg TID    *Alcohol withdrawal  -continue CIWA protocol with ativan PRN    *DVT ppx

## 2019-06-14 NOTE — PROGRESS NOTE ADULT - ASSESSMENT
1. Atrial fibrillation w/ RVR, s/p Watchman  -currently on cardizem 60mg q8h  -can increase to 90mg q8h if HR >100, monitor HR  -monitor electrolytes, monitor for withdrawal  -will arrange MCOT on discharge    Thank you for allowing us to participate in the care of Mr. Boone. Please call with any questions. 1. Atrial fibrillation w/ RVR, s/p Watchman  -currently on cardizem 60mg q8h, increase to 90mg q8h  -monitor HR, can adjust AV peewee agents as needed  -monitor electrolytes, monitor for withdrawal  -patient does not wish for MCOT at this time, can followup as outpatient    Thank you for allowing us to participate in the care of Mr. Boone. Please call with any questions.

## 2019-06-14 NOTE — PROGRESS NOTE ADULT - SUBJECTIVE AND OBJECTIVE BOX
CARDIOLOGY PROGRESS NOTE   ([facility])                             Reason for follow up:                             Overnight: No new events.     Subjective: Patient seen and examined at bedside this AM. Restarted on cardizem yesterday. Patient denies any acute cardiac complaints,. Denies CP, SOB, dizziness, palpitations.     HPI: 64 year old male admitted for near syncope at his cardiologists office. He was found to be hypotensive  and bradycardic.  He received two unites of PC in ED.  The cardiac monitor shows AT Fib  frequent episodes of Bradycardia till approximately 5:13 am.  He is now on a Dopamine drip and is in AT Fib with -115 BPM.  He ia alert and appears to be comfortable. In the ED he had received AS 2 mgs, Glucagon and Ca Gluconate. He has a present history of alcohol abuse. He pulled out an arterial line while in the CCU this admission.  He had a watchman implant from 4/19 of this year. On 5/29/2019 he was admitted to [facility] where 2 also received blood, he is noted to have a ulcer in his  cecum.  During that hospitalization he was noted to be bradycardic as well.     PMH, PSH, Social and Fam Hx: No updates    	  Vitals:  T(C): 36.9 (06-14-19 @ 04:52), Max: 36.9 (06-14-19 @ 04:52)  HR: 77 (06-14-19 @ 04:52) (77 - 111)  BP: 118/58 (06-14-19 @ 04:52) (118/58 - 133/66)  RR: 17 (06-14-19 @ 04:52) (17 - 18)  SpO2: 91% (06-14-19 @ 04:52) (91% - 95%)  Wt(kg): --  I&O's Summary    13 Jun 2019 07:01  -  14 Jun 2019 07:00  --------------------------------------------------------  IN: 0 mL / OUT: 1323 mL / NET: -1323 mL      Weight (kg): 112 (06-10 @ 18:21)    PHYSICAL EXAM:  Appearance: Comfortable. No acute distress  HEENT:  Head and neck: Atraumatic. Normocephalic.  Normal oral mucosa  Neck: Supple, No JVD, no carotid bruit  Neurologic: A & O x 3, no focal deficits  Lymphatic: No cervical lymphadenopathy  Cardiovascular: Irregularly irregular. No rubs/gallops  Respiratory: Lungs clear to auscultation  Gastrointestinal:  Soft, Non-tender, + BS  Lower Extremities: No edema, Left BKA  Psychiatry: Patient is calm. No agitation. Mood & affect appropriate  Skin: No rashes, No ecchymoses, No cyanosis    CURRENT MEDICATIONS:  diltiazem    Tablet 60 milliGRAM(s) Oral three times a day  tamsulosin 0.4 milliGRAM(s) Oral at bedtime    ALBUTerol/ipratropium for Nebulization  tiotropium 18 MICROgram(s) Capsule  gabapentin  pantoprazole    Tablet  aspirin  chewable  clopidogrel Tablet  folic acid  heparin  Injectable  multivitamin  thiamine      LABS:	 	  CARDIAC MARKERS:  Troponin I: <0.015 x 2                             8.9    6.26  )-----------( 163      ( 14 Jun 2019 06:58 )             27.8     06-14    137  |  102  |  9   ----------------------------<  96  4.1   |  28  |  0.73    Ca    8.9      14 Jun 2019 06:58  Phos  4.3     06-14  Mg     1.8     06-14    TPro  6.4  /  Alb  2.6<L>  /  TBili  0.6  /  DBili  0.1  /  AST  15  /  ALT  13  /  AlkPhos  98  06-13    proBNP:   Lipid Profile:   HgA1c: TSH:     TELEMETRY: atrial fib	    ECG: Reviewed. 	    DIAGNOSTIC TESTING:  Echocardiogram 6/10/19:  Estimated left ventricular ejection fraction is 60-65%.The left ventricle is normal in wall thickness, wall motion and contractility as seen in limited views. The left ventricle cavity is moderately dilated. The left atrium is severely dilated. The right atrium appears severely dilated. Normal appearing right ventricle structure and function. Normal aortic valve structure and function. The mitral valve leaflets appear thin and normal. Moderate (2+) mitral regurgitation is present. Normal appearing tricuspid valve structure. Mild to Moderate Tricuspid regurgitation is present. Pulmonic valve not well seen. No evidence of pericardial effusion. No evidence of pleural effusion. IVC is dilated and not collapsing with inspiration. CARDIOLOGY PROGRESS NOTE   ([facility])                             Reason for follow up:                             Overnight: No new events.     Subjective: Patient seen and examined at bedside this AM. Restarted on cardizem yesterday. Patient denies any acute cardiac complaints,. Denies CP, SOB, dizziness, palpitations.     HPI: 64 year old male admitted for near syncope at his cardiologists office. He was found to be hypotensive  and bradycardic.  He received two unites of PC in ED.  The cardiac monitor shows AT Fib  frequent episodes of Bradycardia till approximately 5:13 am.  He is now on a Dopamine drip and is in AT Fib with -115 BPM.  He ia alert and appears to be comfortable. In the ED he had received AS 2 mgs, Glucagon and Ca Gluconate. He has a present history of alcohol abuse. He pulled out an arterial line while in the CCU this admission.  He had a watchman implant from 4/19 of this year. On 5/29/2019 he was admitted to [facility] where 2 also received blood, he is noted to have a ulcer in his  cecum.  During that hospitalization he was noted to be bradycardic as well.     PMH, PSH, Social and Fam Hx: No updates    	  Vitals:  T(C): 36.9 (06-14-19 @ 04:52), Max: 36.9 (06-14-19 @ 04:52)  HR: 77 (06-14-19 @ 04:52) (77 - 111)  BP: 118/58 (06-14-19 @ 04:52) (118/58 - 133/66)  RR: 17 (06-14-19 @ 04:52) (17 - 18)  SpO2: 91% (06-14-19 @ 04:52) (91% - 95%)  Wt(kg): --  I&O's Summary    13 Jun 2019 07:01  -  14 Jun 2019 07:00  --------------------------------------------------------  IN: 0 mL / OUT: 1323 mL / NET: -1323 mL      Weight (kg): 112 (06-10 @ 18:21)    PHYSICAL EXAM:  Appearance: Comfortable. No acute distress  HEENT:  Head and neck: Atraumatic. Normocephalic.  Normal oral mucosa  Neck: Supple, No JVD, no carotid bruit  Neurologic: A & O x 3, no focal deficits  Lymphatic: No cervical lymphadenopathy  Cardiovascular: Irregularly irregular. No rubs/gallops  Respiratory: Lungs clear to auscultation  Gastrointestinal:  Soft, Non-tender, + BS  Lower Extremities: No edema, Left BKA  Psychiatry: Patient is calm. No agitation. Mood & affect appropriate  Skin: No rashes, No ecchymoses, No cyanosis    CURRENT MEDICATIONS:  diltiazem    Tablet 60 milliGRAM(s) Oral three times a day  tamsulosin 0.4 milliGRAM(s) Oral at bedtime    ALBUTerol/ipratropium for Nebulization  tiotropium 18 MICROgram(s) Capsule  gabapentin  pantoprazole    Tablet  aspirin  chewable  clopidogrel Tablet  folic acid  heparin  Injectable  multivitamin  thiamine      LABS:	 	  CARDIAC MARKERS:  Troponin I: <0.015 x 2                             8.9    6.26  )-----------( 163      ( 14 Jun 2019 06:58 )             27.8     06-14    137  |  102  |  9   ----------------------------<  96  4.1   |  28  |  0.73    Ca    8.9      14 Jun 2019 06:58  Phos  4.3     06-14  Mg     1.8     06-14    TPro  6.4  /  Alb  2.6<L>  /  TBili  0.6  /  DBili  0.1  /  AST  15  /  ALT  13  /  AlkPhos  98  06-13    proBNP:   Lipid Profile:   HgA1c: TSH:     TELEMETRY: atrial fib, HR up to 170, wide complex 2/2 atrial fib w/ aberrancy	    ECG: Reviewed. 	    DIAGNOSTIC TESTING:  Echocardiogram 6/10/19:  Estimated left ventricular ejection fraction is 60-65%.The left ventricle is normal in wall thickness, wall motion and contractility as seen in limited views. The left ventricle cavity is moderately dilated. The left atrium is severely dilated. The right atrium appears severely dilated. Normal appearing right ventricle structure and function. Normal aortic valve structure and function. The mitral valve leaflets appear thin and normal. Moderate (2+) mitral regurgitation is present. Normal appearing tricuspid valve structure. Mild to Moderate Tricuspid regurgitation is present. Pulmonic valve not well seen. No evidence of pericardial effusion. No evidence of pleural effusion. IVC is dilated and not collapsing with inspiration.

## 2019-06-14 NOTE — PROGRESS NOTE ADULT - SUBJECTIVE AND OBJECTIVE BOX
HOSPITALIST PROGRESS NOTE:  SUBJECTIVE:  PCP:   Chief Complaint: Patient is a 64y old  Male who presents with a chief complaint of Hypotension, bradycardia (13 Jun 2019 10:13)      HPI:  Mr. Boone is a 64 year old male with PMHx of EtOH abuse, A-Fib s/p watchman procedure on 4/2/19, anemia, CHF, COPD, GERD, cirrhosis, alcohol abuse, HTN, s/p left BKA presented to the ED on 6/10/19 s/p near syncopal episode in his cardiologists office. He had lightheadness and mild abd pain. He was recently discharged from [facility] after having a GI bleed on 5/26/19; his last hemoglobin was 8.2 on 6/1. He denies recent melena, hematochezia, and CP. While at [facility], pt received 6 units PRBC's, 1 unit platelets and 1 unit plasma, and he had a colonoscopy while admitted showing internal hemorrhoids and a friable ulcer in the cecum. During that hospitalization, he was also noted to have low heart rate on 5/31, so he was monitored for another day, remained stable and was discharged.  In the ED, he was noted to be bradycardic and hypotensive  He received atropine x 2, calcium gluconate 1 g, glucagon 10 mg, dopamine, 2 units RBC and IV lorazepam for anxiety/EtOH  In the ED, patient received right sided fem A line. Pulled this out while in the CCU  Per daughter patient can have a bottle of vodka when drinking. His last drink was yesterday (10 Jackson 2019 23:35)    6/13: Above reviewed; patient has no complaints; denies any blood in his stool or abd pain; States that he has not drank alcohol for 2 months; yesterday patient went into AFIB with RVR and EP restarted Diltiazem   6/14:  Patient HR has been going up to 170s overnight; No Cp pr dyspnea     Allergies:  Ceclor (Rash)  Duricef (Rash)    REVIEW OF SYSTEMS:  See HPI. All other review of systems is negative unless indicated above.     OBJECTIVE  Physical Exam:  Vital Signs Last 24 Hrs  T(C): 37.1 (14 Jun 2019 10:37), Max: 37.1 (14 Jun 2019 10:37)  T(F): 98.7 (14 Jun 2019 10:37), Max: 98.7 (14 Jun 2019 10:37)  HR: 97 (14 Jun 2019 10:37) (77 - 111)  BP: 118/48 (14 Jun 2019 10:37) (118/48 - 128/52)  BP(mean): --  RR: 18 (14 Jun 2019 10:37) (17 - 18)  SpO2: 94% (14 Jun 2019 10:37) (91% - 94%)    Constitutional: NAD, awake and alert, well-developed  Neurological: AAO x 3, no focal deficits  HEENT: PERRLA, EOMI, MMM  Neck: Soft and supple, No LAD, No JVD  Respiratory: Breath sounds are clear bilaterally, No wheezing, rales or rhonchi  Cardiovascular: S1 and S2, regular rate and rhythm; no Murmurs, gallops or rubs  Gastrointestinal: Bowel Sounds present, soft, nontender, nondistended, no guarding, no rebound tenderness  Back: No CVA tenderness   Extremities: No peripheral edema; Left BKA  Vascular: 2+ peripheral pulses  Musculoskeletal: 5/5 strength b/l upper and lower extremities  Skin: No rashes  Breast: Deferred  Rectal: Deferred    MEDICATIONS  (STANDING):  ALBUTerol/ipratropium for Nebulization 3 milliLiter(s) Nebulizer every 6 hours  aspirin  chewable 81 milliGRAM(s) Oral daily  clopidogrel Tablet 75 milliGRAM(s) Oral daily  diltiazem    Tablet 60 milliGRAM(s) Oral three times a day  gabapentin 400 milliGRAM(s) Oral three times a day  heparin  Injectable 5000 Unit(s) SubCutaneous every 8 hours  tamsulosin 0.4 milliGRAM(s) Oral at bedtime  tiotropium 18 MICROgram(s) Capsule 1 Capsule(s) Inhalation daily    Lab Results:  CBC  CBC Full  -  ( 14 Jun 2019 06:58 )  WBC Count : 6.26 K/uL  RBC Count : 2.93 M/uL  Hemoglobin : 8.9 g/dL  Hematocrit : 27.8 %  Platelet Count - Automated : 163 K/uL  Mean Cell Volume : 94.9 fl  Mean Cell Hemoglobin : 30.4 pg  Mean Cell Hemoglobin Concentration : 32.0 gm/dL  Auto Neutrophil # : x  Auto Lymphocyte # : x  Auto Monocyte # : x  Auto Eosinophil # : x  Auto Basophil # : x  Auto Neutrophil % : x  Auto Lymphocyte % : x  Auto Monocyte % : x  Auto Eosinophil % : x  Auto Basophil % : x    .		Differential:	[] Automated		[] Manual  Chemistry                        8.9    6.26  )-----------( 163      ( 14 Jun 2019 06:58 )             27.8     06-14    137  |  102  |  9   ----------------------------<  96  4.1   |  28  |  0.73    Ca    8.9      14 Jun 2019 06:58  Phos  4.3     06-14  Mg     1.8     06-14    TPro  6.4  /  Alb  2.6<L>  /  TBili  0.6  /  DBili  0.1  /  AST  15  /  ALT  13  /  AlkPhos  98  06-13    LIVER FUNCTIONS - ( 13 Jun 2019 19:50 )  Alb: 2.6 g/dL / Pro: 6.4 gm/dL / ALK PHOS: 98 U/L / ALT: 13 U/L / AST: 15 U/L / GGT: x             RADIOLOGY/EKG:    < from: Xray Chest 1 View- PORTABLE-Urgent (06.10.19 @ 19:32) >    Pression:    Cardiomegaly. No evidence of acute cardiopulmonary disease.    < end of copied text >

## 2019-06-14 NOTE — PHYSICAL THERAPY INITIAL EVALUATION ADULT - REFERRING PHYSICIAN, REHAB EVAL
Charlotte Bryant Charlotte Bryant - Safe for OOB and gait assessment despite active bedrest order as per telephone conversation with Dr. Bryant (6/14/19 @ 11:22 AM)

## 2019-06-14 NOTE — PHYSICAL THERAPY INITIAL EVALUATION ADULT - MODALITIES TREATMENT COMMENTS
Pt left supine in bed; HM intact; CBIR; Pt instructed to not get up alone; Bed alarm activated; ANTONINO Vickers made aware pt refused further OOB PT assessment and left supine in bed

## 2019-06-14 NOTE — CONSULT NOTE ADULT - ASSESSMENT
64-year-old man with cirrhosis, on anticoagulation and with recent GI bleeding likely due to right-sided ischemic colitis.  CT angiogram negative for focal lesion in the SMA.  Currently without overt bleeding but did require transfusion for anemia.    Recommendations:    -Trend CBC  -monitor for bleeding  -should have colonoscopy in 1-2 months to document resolution of cecal process and rule out malignancy since index biopsies were nondiagnostic as no mucosa present  -consider screening EGD given history of cirrhosis, rule out varices and portal gastropathy  -will follow over weekend and if needs endoscopic testing will schedule next week

## 2019-06-14 NOTE — PHYSICAL THERAPY INITIAL EVALUATION ADULT - GENERAL OBSERVATIONS, REHAB EVAL
Pt rec'd supine in bed; HM intact HR (100's-120's); Pt reported 6/10 pain on R side ribcage s/p previous fall separate from admission reason Pt rec'd supine in bed; HM intact HR (100's-120's); Pt reported 6/10 pain on R side ribcage s/p previous fall separate from admission reason; ANTONINO Vickers made aware of pt's pain level

## 2019-06-14 NOTE — CONSULT NOTE ADULT - SUBJECTIVE AND OBJECTIVE BOX
This is a 65-year-old man with a history of alcoholic cirrhosis, who recently underwent a watchman procedure and is on anticoagulation, who was admitted with shortness of breath on [place] 10.  I was called to evaluate anemia.  On May 27 he was admitted to [facility] with several episodes of red blood per rectum, right lower quadrant pain, weakness and anemia.  He was found to have cecal inflammation on CAT scan, and the colonoscopy was performed on May 29 showing what appeared to be ischemic changes of the cecum, ulceration, but could not rule out malignancy.  Biopsies were obtained but just showed inflamed granulation tissue and no mucosa was present in the biopsy samples.  The patient's right lower quadrant pain resolved and he has had no further bleeding since that time.  He did require several units of blood transfusion at that time.  During this admission he required blood transfusion for anemia, initially with good response but now his hemoglobin has declined again.  He still has no overt bleeding and no abdominal pain.  He denies nausea or vomiting.  There is no fever.  He denies weight loss.  He has not had upper endoscopy that he can recall.    PAST MEDICAL & SURGICAL HISTORY:  Chronic CHF  COPD without exacerbation  Atrial fibrillation  Presence of Watchman left atrial appendage closure device  Hypertension  GERD (gastroesophageal reflux disease)  Chronic obstructive pulmonary disease (COPD)  Anemia  Falls  Meningitis  Collapsed lung  Alcohol withdrawal  Emphysema of lung  Cirrhosis  CHF (congestive heart failure)  Poor historian  Alcohol abuse  Chronic atrial fibrillation  Unilateral amputation of lower extremity below knee  Presence of Watchman left atrial appendage closure device  S/P BKA (below knee amputation) unilateral, left    MEDICATIONS  (STANDING):  ALBUTerol/ipratropium for Nebulization 3 milliLiter(s) Nebulizer every 6 hours  aspirin  chewable 81 milliGRAM(s) Oral daily  clopidogrel Tablet 75 milliGRAM(s) Oral daily  diltiazem    Tablet 60 milliGRAM(s) Oral three times a day  folic acid 1 milliGRAM(s) Oral daily  gabapentin 400 milliGRAM(s) Oral three times a day  heparin  Injectable 5000 Unit(s) SubCutaneous every 8 hours  multivitamin 1 Tablet(s) Oral daily  pantoprazole    Tablet 40 milliGRAM(s) Oral before breakfast  tamsulosin 0.4 milliGRAM(s) Oral at bedtime  thiamine 100 milliGRAM(s) Oral daily  tiotropium 18 MICROgram(s) Capsule 1 Capsule(s) Inhalation daily    Allergies    Ceclor (Rash)  Duricef (Rash)    Intolerances    FAMILY HISTORY:  No pertinent family history in first degree relatives  Family history unknown: Adopted    Soc: recent EtOH use, no tob    ROS: otherwise negative, all systems reviewed    PHYSICAL EXAM:  Vital Signs Last 24 Hrs  T(C): 37.1 (14 Jun 2019 10:37), Max: 37.1 (14 Jun 2019 10:37)  T(F): 98.7 (14 Jun 2019 10:37), Max: 98.7 (14 Jun 2019 10:37)  HR: 97 (14 Jun 2019 10:37) (77 - 111)  BP: 118/48 (14 Jun 2019 10:37) (118/48 - 128/52)  BP(mean): --  RR: 18 (14 Jun 2019 10:37) (17 - 18)  SpO2: 94% (14 Jun 2019 10:37) (91% - 94%)    Constitutional: No acute distress, alert and oriented ×3    Eyes: Anicteric    Neck: Supple, no JVD    Respiratory: Clear to auscultation bilaterally, breathing comfortably    Cardiovascular: Regular rate and rhythm, no murmur    Gastrointestinal: Soft.  Nontender, bulging flanks, obese, nondistended, bowel sounds present.  No mass.      Rectal: Deferred    Extremities: RLE warm, well-perfused, no edema; left BKA noted    Neurological: Grossly intact, no focal deficits    Skin: No lesion or rash    Lymph Nodes: No cervical or supraclavicular lymphadenopathy    Psychiatric: Mood and affect seem normal                          8.9    6.26  )-----------( 163      ( 14 Jun 2019 06:58 )             27.8     06-14    137  |  102  |  9   ----------------------------<  96  4.1   |  28  |  0.73    Ca    8.9      14 Jun 2019 06:58  Phos  4.3     06-14  Mg     1.8     06-14    TPro  6.4  /  Alb  2.6<L>  /  TBili  0.6  /  DBili  0.1  /  AST  15  /  ALT  13  /  AlkPhos  98  06-13    CTA a/p 5/26 reviewed:    Aorta/IVC: Diffusely atherosclerotic. Mesenteric arteries patent with   single origin of the SMA and celiac.    Abdominal wall: No hernia.    Bones/Soft tissues: No acute abnormality.      IMPRESSION:    No active GI bleeding.    Right-sided colitis/cecitis. Follow-up colonoscopy after resolution of   symptoms suggested.

## 2019-06-15 LAB
HCT VFR BLD CALC: 28.7 % — LOW (ref 39–50)
HGB BLD-MCNC: 9.2 G/DL — LOW (ref 13–17)
PLATELET # BLD AUTO: 159 K/UL — SIGNIFICANT CHANGE UP (ref 150–400)

## 2019-06-15 PROCEDURE — 76700 US EXAM ABDOM COMPLETE: CPT | Mod: 26

## 2019-06-15 RX ADMIN — CLOPIDOGREL BISULFATE 75 MILLIGRAM(S): 75 TABLET, FILM COATED ORAL at 12:08

## 2019-06-15 RX ADMIN — Medication 3 MILLILITER(S): at 20:33

## 2019-06-15 RX ADMIN — HEPARIN SODIUM 5000 UNIT(S): 5000 INJECTION INTRAVENOUS; SUBCUTANEOUS at 21:21

## 2019-06-15 RX ADMIN — OXYCODONE HYDROCHLORIDE 5 MILLIGRAM(S): 5 TABLET ORAL at 02:34

## 2019-06-15 RX ADMIN — Medication 1 MILLIGRAM(S): at 12:08

## 2019-06-15 RX ADMIN — HEPARIN SODIUM 5000 UNIT(S): 5000 INJECTION INTRAVENOUS; SUBCUTANEOUS at 05:53

## 2019-06-15 RX ADMIN — Medication 81 MILLIGRAM(S): at 12:08

## 2019-06-15 RX ADMIN — Medication 3 MILLILITER(S): at 08:14

## 2019-06-15 RX ADMIN — OXYCODONE HYDROCHLORIDE 5 MILLIGRAM(S): 5 TABLET ORAL at 19:47

## 2019-06-15 RX ADMIN — Medication 90 MILLIGRAM(S): at 05:53

## 2019-06-15 RX ADMIN — Medication 100 MILLIGRAM(S): at 12:09

## 2019-06-15 RX ADMIN — Medication 1 TABLET(S): at 12:08

## 2019-06-15 RX ADMIN — TAMSULOSIN HYDROCHLORIDE 0.4 MILLIGRAM(S): 0.4 CAPSULE ORAL at 21:21

## 2019-06-15 RX ADMIN — PANTOPRAZOLE SODIUM 40 MILLIGRAM(S): 20 TABLET, DELAYED RELEASE ORAL at 05:53

## 2019-06-15 RX ADMIN — Medication 90 MILLIGRAM(S): at 21:21

## 2019-06-15 RX ADMIN — HEPARIN SODIUM 5000 UNIT(S): 5000 INJECTION INTRAVENOUS; SUBCUTANEOUS at 14:39

## 2019-06-15 RX ADMIN — GABAPENTIN 400 MILLIGRAM(S): 400 CAPSULE ORAL at 05:53

## 2019-06-15 RX ADMIN — Medication 650 MILLIGRAM(S): at 19:46

## 2019-06-15 RX ADMIN — GABAPENTIN 400 MILLIGRAM(S): 400 CAPSULE ORAL at 21:21

## 2019-06-15 RX ADMIN — GABAPENTIN 400 MILLIGRAM(S): 400 CAPSULE ORAL at 14:39

## 2019-06-15 RX ADMIN — Medication 90 MILLIGRAM(S): at 14:39

## 2019-06-15 RX ADMIN — OXYCODONE HYDROCHLORIDE 5 MILLIGRAM(S): 5 TABLET ORAL at 05:53

## 2019-06-15 NOTE — PROGRESS NOTE ADULT - ASSESSMENT
*Bradycardia unsure of Etiology  -transfer from ICU S/P Dopamine Drip  -EP consult appreciated  -Initially CCB and BBx was D/C;  -restarted and increase Diltiazem 90TID  due to AFIB with RVR  -Continue ASA and Plavix  -EP will arrange for out pt monitor and f/u.; Pt will need MCOT upon discharge.    *ANUSHKA likely from [facility].    -Cr now improved  -continue to monitor     *Anemia of blood Loss   *Hx Cirrhosis and LGIB most likely 2ndry to right-sided ischemic colitis  -S/P 2 Units of PRBC  -FU repeat Occult Blood  -GI consult appreciated - should have colonoscopy in 1-2 months to document resolution of cecal process and rule out malignancy; consider screening EGD given history of cirrhosis, rule out varices and portal gastropathy;    *Atrial fibrillation  -No AC as watchman in place.   -restart Diltiazem 90mg TID    *Alcohol withdrawal  -continue CIWA protocol with ativan PRN  -currently patient is not scoring     *RUQ pain  -FU abd us - no gallstones or biliary dilatiation    *DVT ppx

## 2019-06-15 NOTE — PROGRESS NOTE ADULT - SUBJECTIVE AND OBJECTIVE BOX
HOSPITALIST PROGRESS NOTE:  SUBJECTIVE:  PCP:   Chief Complaint: Patient is a 64y old  Male who presents with a chief complaint of Hypotension, bradycardia (13 Jun 2019 10:13)      HPI:  Mr. Boone is a 64 year old male with PMHx of EtOH abuse, A-Fib s/p watchman procedure on 4/2/19, anemia, CHF, COPD, GERD, cirrhosis, alcohol abuse, HTN, s/p left BKA presented to the ED on 6/10/19 s/p near syncopal episode in his cardiologists office. He had lightheadness and mild abd pain. He was recently discharged from [place] after having a GI bleed on 5/26/19; his last hemoglobin was 8.2 on 6/1. He denies recent melena, hematochezia, and CP. While at [place], pt received 6 units PRBC's, 1 unit platelets and 1 unit plasma, and he had a colonoscopy while admitted showing internal hemorrhoids and a friable ulcer in the cecum. During that hospitalization, he was also noted to have low heart rate on 5/31, so he was monitored for another day, remained stable and was discharged.  In the ED, he was noted to be bradycardic and hypotensive  He received atropine x 2, calcium gluconate 1 g, glucagon 10 mg, dopamine, 2 units RBC and IV lorazepam for anxiety/EtOH  In the ED, patient received right sided fem A line. Pulled this out while in the CCU  Per daughter patient can have a bottle of vodka when drinking. His last drink was yesterday (10 Jackson 2019 23:35)    6/13: Above reviewed; patient has no complaints; denies any blood in his stool or abd pain; States that he has not drank alcohol for 2 months; yesterday patient went into AFIB with RVR and EP restarted Diltiazem   6/14:  Patient HR has been going up to 170s overnight; No Cp pr dyspnea   6/15:  HR improved occassionally goes up to the 120's still c/o right sided rib or RUQ pain    Allergies:  Ceclor (Rash)  Duricef (Rash)    REVIEW OF SYSTEMS:  See HPI. All other review of systems is negative unless indicated above.     OBJECTIVE  Physical Exam:  Vital Signs Last 24 Hrs  T(C): 36.3 (15 Jackson 2019 16:35), Max: 36.8 (15 Jackson 2019 05:32)  T(F): 97.4 (15 Jackson 2019 16:35), Max: 98.2 (15 Jackson 2019 05:32)  HR: 85 (15 Jackson 2019 16:35) (80 - 94)  BP: 125/61 (15 Jackson 2019 16:35) (123/68 - 128/64)  BP(mean): --  RR: 18 (15 Jackson 2019 10:35) (17 - 18)  SpO2: 93% (15 Jackson 2019 16:35) (92% - 98%)    Constitutional: NAD, awake and alert, well-developed  Neurological: AAO x 3, no focal deficits  HEENT: PERRLA, EOMI, MMM  Neck: Soft and supple, No LAD, No JVD  Respiratory: Breath sounds are clear bilaterally, No wheezing, rales or rhonchi  Cardiovascular: S1 and S2, regular rate and rhythm; no Murmurs, gallops or rubs  Gastrointestinal: Bowel Sounds present, soft, nontender, nondistended, no guarding, no rebound tenderness  Back: No CVA tenderness   Extremities: No peripheral edema; Left BKA  Vascular: 2+ peripheral pulses  Musculoskeletal: 5/5 strength b/l upper and lower extremities  Skin: No rashes  Breast: Deferred  Rectal: Deferred    MEDICATIONS  (STANDING):  ALBUTerol/ipratropium for Nebulization 3 milliLiter(s) Nebulizer every 6 hours  aspirin  chewable 81 milliGRAM(s) Oral daily  clopidogrel Tablet 75 milliGRAM(s) Oral daily  diltiazem    Tablet 60 milliGRAM(s) Oral three times a day  gabapentin 400 milliGRAM(s) Oral three times a day  heparin  Injectable 5000 Unit(s) SubCutaneous every 8 hours  tamsulosin 0.4 milliGRAM(s) Oral at bedtime  tiotropium 18 MICROgram(s) Capsule 1 Capsule(s) Inhalation daily    Lab Results:  CBC  CBC Full  -  ( 15 Jackson 2019 07:31 )  WBC Count : x  RBC Count : x  Hemoglobin : 9.2 g/dL  Hematocrit : 28.7 %  Platelet Count - Automated : 159 K/uL  Mean Cell Volume : x  Mean Cell Hemoglobin : x  Mean Cell Hemoglobin Concentration : x  Auto Neutrophil # : x  Auto Lymphocyte # : x  Auto Monocyte # : x  Auto Eosinophil # : x  Auto Basophil # : x  Auto Neutrophil % : x  Auto Lymphocyte % : x  Auto Monocyte % : x  Auto Eosinophil % : x  Auto Basophil % : x    .		Differential:	[] Automated		[] Manual  Chemistry                        9.2    x     )-----------( 159      ( 15 Jackson 2019 07:31 )             28.7     06-14    137  |  102  |  9   ----------------------------<  96  4.1   |  28  |  0.73    Ca    8.9      14 Jun 2019 06:58  Phos  4.3     06-14  Mg     1.8     06-14    TPro  6.4  /  Alb  2.6<L>  /  TBili  0.6  /  DBili  0.1  /  AST  15  /  ALT  13  /  AlkPhos  98  06-13    LIVER FUNCTIONS - ( 13 Jun 2019 19:50 )  Alb: 2.6 g/dL / Pro: 6.4 gm/dL / ALK PHOS: 98 U/L / ALT: 13 U/L / AST: 15 U/L / GGT: x             RADIOLOGY/EKG:    < from: Xray Chest 1 View- PORTABLE-Urgent (06.10.19 @ 19:32) >    Pression:    Cardiomegaly. No evidence of acute cardiopulmonary disease.    < end of copied text >    < from: US Abdomen Complete (06.15.19 @ 13:41) >    IMPRESSION:    No gallstones or biliary dilatation     < end of copied text >

## 2019-06-16 LAB
HCT VFR BLD CALC: 27.5 % — LOW (ref 39–50)
HGB BLD-MCNC: 8.7 G/DL — LOW (ref 13–17)
MAGNESIUM SERPL-MCNC: 2 MG/DL — SIGNIFICANT CHANGE UP (ref 1.6–2.6)
PHOSPHATE SERPL-MCNC: 4.2 MG/DL — SIGNIFICANT CHANGE UP (ref 2.5–4.5)
PLATELET # BLD AUTO: 157 K/UL — SIGNIFICANT CHANGE UP (ref 150–400)
POTASSIUM SERPL-MCNC: 3.9 MMOL/L — SIGNIFICANT CHANGE UP (ref 3.5–5.3)
POTASSIUM SERPL-SCNC: 3.9 MMOL/L — SIGNIFICANT CHANGE UP (ref 3.5–5.3)

## 2019-06-16 RX ADMIN — Medication 650 MILLIGRAM(S): at 14:44

## 2019-06-16 RX ADMIN — GABAPENTIN 400 MILLIGRAM(S): 400 CAPSULE ORAL at 06:21

## 2019-06-16 RX ADMIN — OXYCODONE HYDROCHLORIDE 5 MILLIGRAM(S): 5 TABLET ORAL at 14:43

## 2019-06-16 RX ADMIN — Medication 650 MILLIGRAM(S): at 19:49

## 2019-06-16 RX ADMIN — PANTOPRAZOLE SODIUM 40 MILLIGRAM(S): 20 TABLET, DELAYED RELEASE ORAL at 06:21

## 2019-06-16 RX ADMIN — GABAPENTIN 400 MILLIGRAM(S): 400 CAPSULE ORAL at 13:01

## 2019-06-16 RX ADMIN — Medication 90 MILLIGRAM(S): at 14:47

## 2019-06-16 RX ADMIN — OXYCODONE HYDROCHLORIDE 5 MILLIGRAM(S): 5 TABLET ORAL at 19:49

## 2019-06-16 RX ADMIN — TAMSULOSIN HYDROCHLORIDE 0.4 MILLIGRAM(S): 0.4 CAPSULE ORAL at 21:31

## 2019-06-16 RX ADMIN — Medication 650 MILLIGRAM(S): at 08:40

## 2019-06-16 RX ADMIN — CLOPIDOGREL BISULFATE 75 MILLIGRAM(S): 75 TABLET, FILM COATED ORAL at 13:01

## 2019-06-16 RX ADMIN — GABAPENTIN 400 MILLIGRAM(S): 400 CAPSULE ORAL at 21:34

## 2019-06-16 RX ADMIN — Medication 90 MILLIGRAM(S): at 06:21

## 2019-06-16 RX ADMIN — OXYCODONE HYDROCHLORIDE 5 MILLIGRAM(S): 5 TABLET ORAL at 01:38

## 2019-06-16 RX ADMIN — Medication 100 MILLIGRAM(S): at 13:01

## 2019-06-16 RX ADMIN — Medication 81 MILLIGRAM(S): at 13:01

## 2019-06-16 RX ADMIN — Medication 1 MILLIGRAM(S): at 13:01

## 2019-06-16 RX ADMIN — Medication 90 MILLIGRAM(S): at 21:31

## 2019-06-16 RX ADMIN — OXYCODONE HYDROCHLORIDE 5 MILLIGRAM(S): 5 TABLET ORAL at 13:05

## 2019-06-16 RX ADMIN — Medication 650 MILLIGRAM(S): at 13:05

## 2019-06-16 RX ADMIN — Medication 3 MILLILITER(S): at 20:01

## 2019-06-16 RX ADMIN — Medication 650 MILLIGRAM(S): at 01:38

## 2019-06-16 RX ADMIN — HEPARIN SODIUM 5000 UNIT(S): 5000 INJECTION INTRAVENOUS; SUBCUTANEOUS at 21:31

## 2019-06-16 RX ADMIN — Medication 1 TABLET(S): at 13:00

## 2019-06-16 RX ADMIN — HEPARIN SODIUM 5000 UNIT(S): 5000 INJECTION INTRAVENOUS; SUBCUTANEOUS at 14:47

## 2019-06-16 RX ADMIN — Medication 3 MILLILITER(S): at 02:49

## 2019-06-16 RX ADMIN — HEPARIN SODIUM 5000 UNIT(S): 5000 INJECTION INTRAVENOUS; SUBCUTANEOUS at 06:21

## 2019-06-16 NOTE — PROGRESS NOTE ADULT - ASSESSMENT
*Bradycardia unsure of Etiology  -transfer from ICU S/P Dopamine Drip  -EP consult appreciated  -Initially CCB and BBx was D/C;  -restarted and increase Diltiazem 90TID  due to AFIB with RVR  -Continue ASA and Plavix  -EP will arrange for out pt monitor and f/u.; Pt will need MCOT upon discharge.    *ANUSHKA likely from [facility].    -Cr now improved  -continue to monitor     *Anemia of blood Loss   *Hx Cirrhosis and LGIB most likely 2ndry to right-sided ischemic colitis  -S/P 2 Units of PRBC  -FU repeat Occult Blood  -GI consult appreciated - should have colonoscopy in 1-2 months to document resolution of cecal process and rule out malignancy; consider screening EGD given history of cirrhosis, rule out varices and portal gastropathy;    *Atrial fibrillation  -No AC as watchman in place.   -restart Diltiazem 90mg TID    *Alcohol withdrawal  -continue CIWA protocol with ativan PRN  -currently patient is not scoring     *RUQ pain  -abd us - no gallstones or biliary dilatiation    *DVT ppx

## 2019-06-16 NOTE — PROGRESS NOTE ADULT - SUBJECTIVE AND OBJECTIVE BOX
HOSPITALIST PROGRESS NOTE:  SUBJECTIVE:  PCP:   Chief Complaint: Patient is a 64y old  Male who presents with a chief complaint of Hypotension, bradycardia (13 Jun 2019 10:13)      HPI:  Mr. Boone is a 64 year old male with PMHx of EtOH abuse, A-Fib s/p watchman procedure on 4/2/19, anemia, CHF, COPD, GERD, cirrhosis, alcohol abuse, HTN, s/p left BKA presented to the ED on 6/10/19 s/p near syncopal episode in his cardiologists office. He had lightheadness and mild abd pain. He was recently discharged from [place] after having a GI bleed on 5/26/19; his last hemoglobin was 8.2 on 6/1. He denies recent melena, hematochezia, and CP. While at [place], pt received 6 units PRBC's, 1 unit platelets and 1 unit plasma, and he had a colonoscopy while admitted showing internal hemorrhoids and a friable ulcer in the cecum. During that hospitalization, he was also noted to have low heart rate on 5/31, so he was monitored for another day, remained stable and was discharged.  In the ED, he was noted to be bradycardic and hypotensive  He received atropine x 2, calcium gluconate 1 g, glucagon 10 mg, dopamine, 2 units RBC and IV lorazepam for anxiety/EtOH  In the ED, patient received right sided fem A line. Pulled this out while in the CCU  Per daughter patient can have a bottle of vodka when drinking. His last drink was yesterday (10 Jackson 2019 23:35)    6/13: Above reviewed; patient has no complaints; denies any blood in his stool or abd pain; States that he has not drank alcohol for 2 months; yesterday patient went into AFIB with RVR and EP restarted Diltiazem   6/14:  Patient HR has been going up to 170s overnight; No Cp pr dyspnea   6/15:  HR improved occassionally goes up to the 120's still c/o right sided rib or RUQ pain  6/16:  HR better controlled; No overnight events     Allergies:  Ceclor (Rash)  Duricef (Rash)    REVIEW OF SYSTEMS:  See HPI. All other review of systems is negative unless indicated above.     OBJECTIVE  Physical Exam:  Vital Signs Last 24 Hrs  T(C): 36.3 (16 Jun 2019 10:22), Max: 36.4 (16 Jun 2019 05:01)  T(F): 97.4 (16 Jun 2019 10:22), Max: 97.5 (16 Jun 2019 05:01)  HR: 83 (16 Jun 2019 10:22) (80 - 88)  BP: 125/52 (16 Jun 2019 10:22) (97/66 - 125/61)  BP(mean): --  RR: 18 (16 Jun 2019 10:22) (18 - 18)  SpO2: 93% (16 Jun 2019 10:22) (93% - 94%)    Constitutional: NAD, awake and alert, well-developed  Neurological: AAO x 3, no focal deficits  HEENT: PERRLA, EOMI, MMM  Neck: Soft and supple, No LAD, No JVD  Respiratory: Breath sounds are clear bilaterally, No wheezing, rales or rhonchi  Cardiovascular: S1 and S2, regular rate and rhythm; no Murmurs, gallops or rubs  Gastrointestinal: Bowel Sounds present, soft, nontender, nondistended, no guarding, no rebound tenderness  Back: No CVA tenderness   Extremities: No peripheral edema; Left BKA  Vascular: 2+ peripheral pulses  Musculoskeletal: 5/5 strength b/l upper and lower extremities  Skin: No rashes  Breast: Deferred  Rectal: Deferred    MEDICATIONS  (STANDING):  ALBUTerol/ipratropium for Nebulization 3 milliLiter(s) Nebulizer every 6 hours  aspirin  chewable 81 milliGRAM(s) Oral daily  clopidogrel Tablet 75 milliGRAM(s) Oral daily  diltiazem    Tablet 60 milliGRAM(s) Oral three times a day  gabapentin 400 milliGRAM(s) Oral three times a day  heparin  Injectable 5000 Unit(s) SubCutaneous every 8 hours  tamsulosin 0.4 milliGRAM(s) Oral at bedtime  tiotropium 18 MICROgram(s) Capsule 1 Capsule(s) Inhalation daily    Lab Results:  CBC  CBC Full  -  ( 16 Jun 2019 07:14 )  WBC Count : x  RBC Count : x  Hemoglobin : 8.7 g/dL  Hematocrit : 27.5 %  Platelet Count - Automated : 157 K/uL  Mean Cell Volume : x  Mean Cell Hemoglobin : x  Mean Cell Hemoglobin Concentration : x  Auto Neutrophil # : x  Auto Lymphocyte # : x  Auto Monocyte # : x  Auto Eosinophil # : x  Auto Basophil # : x  Auto Neutrophil % : x  Auto Lymphocyte % : x  Auto Monocyte % : x  Auto Eosinophil % : x  Auto Basophil % : x    .		Differential:	[] Automated		[] Manual  Chemistry                        8.7    x     )-----------( 157      ( 16 Jun 2019 07:14 )             27.5     06-16    x   |  x   |  x   ----------------------------<  x   3.9   |  x   |  x     Phos  4.2     06-16  Mg     2.0     06-16        RADIOLOGY/EKG:    < from: Xray Chest 1 View- PORTABLE-Urgent (06.10.19 @ 19:32) >    Pression:    Cardiomegaly. No evidence of acute cardiopulmonary disease.    < end of copied text >    < from: US Abdomen Complete (06.15.19 @ 13:41) >    IMPRESSION:    No gallstones or biliary dilatation     < end of copied text >

## 2019-06-17 ENCOUNTER — TRANSCRIPTION ENCOUNTER (OUTPATIENT)
Age: 64
End: 2019-06-17

## 2019-06-17 VITALS — DIASTOLIC BLOOD PRESSURE: 72 MMHG | HEART RATE: 90 BPM | SYSTOLIC BLOOD PRESSURE: 135 MMHG

## 2019-06-17 LAB
HCT VFR BLD CALC: 29.4 % — LOW (ref 39–50)
HGB BLD-MCNC: 9.4 G/DL — LOW (ref 13–17)

## 2019-06-17 RX ORDER — DILTIAZEM HCL 120 MG
1 CAPSULE, EXT RELEASE 24 HR ORAL
Qty: 90 | Refills: 0
Start: 2019-06-17 | End: 2019-07-16

## 2019-06-17 RX ORDER — DILTIAZEM HCL 120 MG
1 CAPSULE, EXT RELEASE 24 HR ORAL
Qty: 0 | Refills: 0 | DISCHARGE

## 2019-06-17 RX ADMIN — OXYCODONE HYDROCHLORIDE 5 MILLIGRAM(S): 5 TABLET ORAL at 06:44

## 2019-06-17 RX ADMIN — Medication 650 MILLIGRAM(S): at 02:50

## 2019-06-17 RX ADMIN — Medication 90 MILLIGRAM(S): at 14:49

## 2019-06-17 RX ADMIN — PANTOPRAZOLE SODIUM 40 MILLIGRAM(S): 20 TABLET, DELAYED RELEASE ORAL at 05:52

## 2019-06-17 RX ADMIN — GABAPENTIN 400 MILLIGRAM(S): 400 CAPSULE ORAL at 14:49

## 2019-06-17 RX ADMIN — Medication 650 MILLIGRAM(S): at 02:08

## 2019-06-17 RX ADMIN — CLOPIDOGREL BISULFATE 75 MILLIGRAM(S): 75 TABLET, FILM COATED ORAL at 11:53

## 2019-06-17 RX ADMIN — Medication 3 MILLILITER(S): at 07:48

## 2019-06-17 RX ADMIN — Medication 1 MILLIGRAM(S): at 11:53

## 2019-06-17 RX ADMIN — OXYCODONE HYDROCHLORIDE 5 MILLIGRAM(S): 5 TABLET ORAL at 02:08

## 2019-06-17 RX ADMIN — Medication 81 MILLIGRAM(S): at 11:53

## 2019-06-17 RX ADMIN — Medication 90 MILLIGRAM(S): at 05:52

## 2019-06-17 RX ADMIN — Medication 1 TABLET(S): at 11:54

## 2019-06-17 RX ADMIN — TIOTROPIUM BROMIDE 1 CAPSULE(S): 18 CAPSULE ORAL; RESPIRATORY (INHALATION) at 07:47

## 2019-06-17 RX ADMIN — Medication 3 MILLILITER(S): at 13:55

## 2019-06-17 RX ADMIN — GABAPENTIN 400 MILLIGRAM(S): 400 CAPSULE ORAL at 05:52

## 2019-06-17 RX ADMIN — HEPARIN SODIUM 5000 UNIT(S): 5000 INJECTION INTRAVENOUS; SUBCUTANEOUS at 05:52

## 2019-06-17 RX ADMIN — Medication 3 MILLILITER(S): at 01:20

## 2019-06-17 NOTE — DISCHARGE NOTE PROVIDER - CARE PROVIDERS DIRECT ADDRESSES
[email or web address]

## 2019-06-17 NOTE — DISCHARGE NOTE PROVIDER - HOSPITAL COURSE
HOSPITALIST PROGRESS NOTE:    SUBJECTIVE:    PCP:     Chief Complaint: Patient is a 64y old  Male who presents with a chief complaint of Hypotension, bradycardia (13 Jun 2019 10:13)            HPI:    Mr. Boone is a 64 year old male with PMHx of EtOH abuse, A-Fib s/p watchman procedure on 4/2/19, anemia, CHF, COPD, GERD, cirrhosis, alcohol abuse, HTN, s/p left BKA presented to the ED on 6/10/19 s/p near syncopal episode in his cardiologists office. He had lightheadness and mild abd pain. He was recently discharged from [place] after having a GI bleed on 5/26/19; his last hemoglobin was 8.2 on 6/1. He denies recent melena, hematochezia, and CP. While at [place], pt received 6 units PRBC's, 1 unit platelets and 1 unit plasma, and he had a colonoscopy while admitted showing internal hemorrhoids and a friable ulcer in the cecum. During that hospitalization, he was also noted to have low heart rate on 5/31, so he was monitored for another day, remained stable and was discharged.    In the ED, he was noted to be bradycardic and hypotensive    He received atropine x 2, calcium gluconate 1 g, glucagon 10 mg, dopamine, 2 units RBC and IV lorazepam for anxiety/EtOH    In the ED, patient received right sided fem A line. Pulled this out while in the CCU    Per daughter patient can have a bottle of vodka when drinking. His last drink was yesterday (10 Jackson 2019 23:35)        6/13: Above reviewed; patient has no complaints; denies any blood in his stool or abd pain; States that he has not drank alcohol for 2 months; yesterday patient went into AFIB with RVR and EP restarted Diltiazem     6/14:  Patient HR has been going up to 170s overnight; No Cp pr dyspnea     6/15:  HR improved occassionally goes up to the 120's still c/o right sided rib or RUQ pain    6/16:  HR better controlled; No overnight events     6/17:  Patient has no complaints.  HR better controlled; NO nolberto             *Bradycardia unsure of Etiology    -transfer from ICU S/P Dopamine Drip    -EP consult appreciated    -Initially CCB and BBx was D/C;    -restarted and increase Diltiazem 90TID  due to AFIB with RVR now controlled     -Continue ASA and Plavix    -EP will arrange for out pt monitor and f/u.; Pt will need MCOT upon discharge.        *ANUSHKA likely from [facility].      -Cr now improved    -continue to monitor         *Anemia of blood Loss     *Hx Cirrhosis and LGIB most likely 2ndry to right-sided ischemic colitis    -S/P 2 Units of PRBC    -FU repeat Occult Blood    -GI consult appreciated - should have colonoscopy in 1-2 months to document resolution of cecal process and rule out malignancy; consider screening EGD given history of cirrhosis, rule out varices and portal gastropathy;        *Atrial fibrillation    -No AC as watchman in place.     -restart Diltiazem 90mg TID        *Alcohol withdrawal    -continue CIWA protocol with ativan PRN    -currently patient is not scoring         *RUQ pain    -abd us - no gallstones or biliary dilatiation        *DVT ppx        total time : 45 minutes

## 2019-06-17 NOTE — DISCHARGE NOTE PROVIDER - NSDCCPCAREPLAN_GEN_ALL_CORE_FT
PRINCIPAL DISCHARGE DIAGNOSIS  Diagnosis: Bradycardia  Assessment and Plan of Treatment: Stop metoprolol  changed cardizem 90 Q8H  FU with EP and cardion      SECONDARY DISCHARGE DIAGNOSES  Diagnosis: Acute renal failure  Assessment and Plan of Treatment: stop lisinopril, also due to Bp being on the lower side   continue lasix and monior renal function with PCP;    Diagnosis: Anemia  Assessment and Plan of Treatment: Anemia of blood Loss   *Hx Cirrhosis and LGIB most likely 2ndry to right-sided ischemic colitis  -S/P 2 Units of PRBC  -Fu with GI consult - should have colonoscopy in 1-2 months to document resolution of cecal process and rule out malignancy; consider screening EGD given history of cirrhosis, rule out varices and portal gastropathy;

## 2019-06-17 NOTE — PROGRESS NOTE ADULT - ASSESSMENT
*Bradycardia unsure of Etiology  -transfer from ICU S/P Dopamine Drip  -EP consult appreciated  -Initially CCB and BBx was D/C;  -restarted and increase Diltiazem 90TID  due to AFIB with RVR now controlled   -Continue ASA and Plavix  -EP will arrange for out pt monitor and f/u.; Pt will need MCOT upon discharge.    *ANUSHKA likely from [facility].    -Cr now improved  -continue to monitor     *Anemia of blood Loss   *Hx Cirrhosis and LGIB most likely 2ndry to right-sided ischemic colitis  -S/P 2 Units of PRBC  -FU repeat Occult Blood  -GI consult appreciated - should have colonoscopy in 1-2 months to document resolution of cecal process and rule out malignancy; consider screening EGD given history of cirrhosis, rule out varices and portal gastropathy;    *Atrial fibrillation  -No AC as watchman in place.   -restart Diltiazem 90mg TID    *Alcohol withdrawal  -continue CIWA protocol with ativan PRN  -currently patient is not scoring     *RUQ pain  -abd us - no gallstones or biliary dilatiation    *DVT ppx

## 2019-06-17 NOTE — PHARMACOTHERAPY INTERVENTION NOTE - COMMENTS
med history complete, confirmed medications with doctor first med profile
reviewed medication changes with patient answered all questions

## 2019-06-17 NOTE — PROGRESS NOTE ADULT - ASSESSMENT
64-year-old man with cirrhosis, on anticoagulation and with recent GI bleeding likely due to right-sided ischemic colitis.  CT angiogram negative for focal lesion in the SMA.  Currently without overt bleeding but did require transfusion for anemia.  Has been stable now in hospital.    Recommendations:    -should have colonoscopy in 1-2 months to document resolution of cecal process and rule out malignancy since index biopsies were nondiagnostic as no mucosa present; instructed pt to follow up with original gastroenterologist after discharge  -consider screening EGD given history of cirrhosis, rule out varices and portal gastropathy  -no GI objection to discharge

## 2019-06-17 NOTE — PROGRESS NOTE ADULT - REASON FOR ADMISSION
Hypotension, bradycardia

## 2019-06-17 NOTE — PROGRESS NOTE ADULT - SUBJECTIVE AND OBJECTIVE BOX
HOSPITALIST PROGRESS NOTE:  SUBJECTIVE:  PCP:   Chief Complaint: Patient is a 64y old  Male who presents with a chief complaint of Hypotension, bradycardia (13 Jun 2019 10:13)      HPI:  Mr. Boone is a 64 year old male with PMHx of EtOH abuse, A-Fib s/p watchman procedure on 4/2/19, anemia, CHF, COPD, GERD, cirrhosis, alcohol abuse, HTN, s/p left BKA presented to the ED on 6/10/19 s/p near syncopal episode in his cardiologists office. He had lightheadness and mild abd pain. He was recently discharged from [facility] after having a GI bleed on 5/26/19; his last hemoglobin was 8.2 on 6/1. He denies recent melena, hematochezia, and CP. While at [facility], pt received 6 units PRBC's, 1 unit platelets and 1 unit plasma, and he had a colonoscopy while admitted showing internal hemorrhoids and a friable ulcer in the cecum. During that hospitalization, he was also noted to have low heart rate on 5/31, so he was monitored for another day, remained stable and was discharged.  In the ED, he was noted to be bradycardic and hypotensive  He received atropine x 2, calcium gluconate 1 g, glucagon 10 mg, dopamine, 2 units RBC and IV lorazepam for anxiety/EtOH  In the ED, patient received right sided fem A line. Pulled this out while in the CCU  Per daughter patient can have a bottle of vodka when drinking. His last drink was yesterday (10 Jackson 2019 23:35)    6/13: Above reviewed; patient has no complaints; denies any blood in his stool or abd pain; States that he has not drank alcohol for 2 months; yesterday patient went into AFIB with RVR and EP restarted Diltiazem   6/14:  Patient HR has been going up to 170s overnight; No Cp pr dyspnea   6/15:  HR improved occassionally goes up to the 120's still c/o right sided rib or RUQ pain  6/16:  HR better controlled; No overnight events   6/17:  Patient has no complaints.  HR better controlled; NO nolberto     Allergies:  Ceclor (Rash)  Duricef (Rash)    REVIEW OF SYSTEMS:  See HPI. All other review of systems is negative unless indicated above.     OBJECTIVE  Physical Exam:  Vital Signs Last 24 Hrs  T(C): 36.6 (17 Jun 2019 05:06), Max: 36.6 (17 Jun 2019 05:06)  T(F): 97.9 (17 Jun 2019 05:06), Max: 97.9 (17 Jun 2019 05:06)  HR: 82 (17 Jun 2019 07:50) (80 - 97)  BP: 132/52 (17 Jun 2019 05:06) (124/56 - 139/59)  BP(mean): --  RR: 18 (16 Jun 2019 16:25) (18 - 18)  SpO2: 93% (17 Jun 2019 05:06) (92% - 93%)    Constitutional: NAD, awake and alert, well-developed  Neurological: AAO x 3, no focal deficits  HEENT: PERRLA, EOMI, MMM  Neck: Soft and supple, No LAD, No JVD  Respiratory: Breath sounds are clear bilaterally, No wheezing, rales or rhonchi  Cardiovascular: S1 and S2, regular rate and rhythm; no Murmurs, gallops or rubs  Gastrointestinal: Bowel Sounds present, soft, nontender, nondistended, no guarding, no rebound tenderness  Back: No CVA tenderness   Extremities: No peripheral edema; Left BKA  Vascular: 2+ peripheral pulses  Musculoskeletal: 5/5 strength b/l upper and lower extremities  Skin: No rashes  Breast: Deferred  Rectal: Deferred    MEDICATIONS  (STANDING):  ALBUTerol/ipratropium for Nebulization 3 milliLiter(s) Nebulizer every 6 hours  aspirin  chewable 81 milliGRAM(s) Oral daily  clopidogrel Tablet 75 milliGRAM(s) Oral daily  diltiazem    Tablet 60 milliGRAM(s) Oral three times a day  gabapentin 400 milliGRAM(s) Oral three times a day  heparin  Injectable 5000 Unit(s) SubCutaneous every 8 hours  tamsulosin 0.4 milliGRAM(s) Oral at bedtime  tiotropium 18 MICROgram(s) Capsule 1 Capsule(s) Inhalation daily    Lab Results:  CBC  CBC Full  -  ( 17 Jun 2019 08:38 )  WBC Count : x  RBC Count : x  Hemoglobin : 9.4 g/dL  Hematocrit : 29.4 %  Platelet Count - Automated : x  Mean Cell Volume : x  Mean Cell Hemoglobin : x  Mean Cell Hemoglobin Concentration : x  Auto Neutrophil # : x  Auto Lymphocyte # : x  Auto Monocyte # : x  Auto Eosinophil # : x  Auto Basophil # : x  Auto Neutrophil % : x  Auto Lymphocyte % : x  Auto Monocyte % : x  Auto Eosinophil % : x  Auto Basophil % : x    .		Differential:	[] Automated		[] Manual  Chemistry                        9.4    x     )-----------( x        ( 17 Jun 2019 08:38 )             29.4     06-16    x   |  x   |  x   ----------------------------<  x   3.9   |  x   |  x     Phos  4.2     06-16  Mg     2.0     06-16      RADIOLOGY/EKG:    < from: Xray Chest 1 View- PORTABLE-Urgent (06.10.19 @ 19:32) >    Pression:    Cardiomegaly. No evidence of acute cardiopulmonary disease.    < end of copied text >    < from: US Abdomen Complete (06.15.19 @ 13:41) >    IMPRESSION:    No gallstones or biliary dilatation     < end of copied text >

## 2019-06-17 NOTE — PROGRESS NOTE ADULT - PROVIDER SPECIALTY LIST ADULT
Cardiology
Critical Care
Electrophysiology
Electrophysiology
Gastroenterology
Hospitalist
Critical Care

## 2019-06-17 NOTE — PROVIDER CONTACT NOTE (OTHER) - SITUATION
Cardiac Electrophysiologist aware of consult
Dr. Estrella spoke with Dr. Hein regarding pt case.
appointment made for July 26 at 330pm due to biotel ordered need 21 days
service aware of consult

## 2019-06-17 NOTE — PROGRESS NOTE ADULT - SUBJECTIVE AND OBJECTIVE BOX
GI    feels well  no rectal bleeding or melena  no add'l transfusion requirement  no n/v  darryl PO  no abd pain    ROS: otherwise negative, all systems reviewed    PHYSICAL EXAM:  Vital Signs Last 24 Hrs  T(C): 36.6 (17 Jun 2019 05:06), Max: 36.6 (17 Jun 2019 05:06)  T(F): 97.9 (17 Jun 2019 05:06), Max: 97.9 (17 Jun 2019 05:06)  HR: 82 (17 Jun 2019 07:50) (80 - 97)  BP: 132/52 (17 Jun 2019 05:06) (124/56 - 139/59)  BP(mean): --  RR: 18 (16 Jun 2019 16:25) (18 - 18)  SpO2: 93% (17 Jun 2019 05:06) (92% - 93%)    Constitutional: No acute distress, alert and oriented ×3    Eyes: Anicteric    Neck: Supple, no JVD    Respiratory: Clear to auscultation bilaterally, breathing comfortably    Cardiovascular: Regular rate and rhythm, no murmur    Gastrointestinal: Soft.  Nontender, bulging flanks, obese, nondistended, bowel sounds present.  No mass.      Extremities: RLE warm, well-perfused, no edema; left BKA noted    Neurological: Grossly intact, no focal deficits    Skin: No lesion or rash    Lymph Nodes: No cervical or supraclavicular lymphadenopathy    Psychiatric: Mood and affect seem normal                                             9.4    x     )-----------( x        ( 17 Jun 2019 08:38 )             29.4

## 2019-06-17 NOTE — DISCHARGE NOTE NURSING/CASE MANAGEMENT/SOCIAL WORK - NSDCDPATPORTLINK_GEN_ALL_CORE
You can access the [facility] Patient Portal, offered by [facility], by registering with the following website: [email or web address]

## 2019-06-20 DIAGNOSIS — J43.9 EMPHYSEMA, UNSPECIFIED: ICD-10-CM

## 2019-06-20 DIAGNOSIS — Z88.8 ALLERGY STATUS TO OTHER DRUGS, MEDICAMENTS AND BIOLOGICAL SUBSTANCES: ICD-10-CM

## 2019-06-20 DIAGNOSIS — N17.0 ACUTE KIDNEY FAILURE WITH TUBULAR NECROSIS: ICD-10-CM

## 2019-06-20 DIAGNOSIS — F10.239 ALCOHOL DEPENDENCE WITH WITHDRAWAL, UNSPECIFIED: ICD-10-CM

## 2019-06-20 DIAGNOSIS — K74.60 UNSPECIFIED CIRRHOSIS OF LIVER: ICD-10-CM

## 2019-06-20 DIAGNOSIS — D50.0 IRON DEFICIENCY ANEMIA SECONDARY TO BLOOD LOSS (CHRONIC): ICD-10-CM

## 2019-06-20 DIAGNOSIS — Z89.512 ACQUIRED ABSENCE OF LEFT LEG BELOW KNEE: ICD-10-CM

## 2019-06-20 DIAGNOSIS — R00.1 BRADYCARDIA, UNSPECIFIED: ICD-10-CM

## 2019-06-20 DIAGNOSIS — I48.2 CHRONIC ATRIAL FIBRILLATION: ICD-10-CM

## 2019-06-20 DIAGNOSIS — F41.9 ANXIETY DISORDER, UNSPECIFIED: ICD-10-CM

## 2019-06-20 DIAGNOSIS — K21.9 GASTRO-ESOPHAGEAL REFLUX DISEASE WITHOUT ESOPHAGITIS: ICD-10-CM

## 2019-06-20 DIAGNOSIS — I11.0 HYPERTENSIVE HEART DISEASE WITH HEART FAILURE: ICD-10-CM

## 2019-06-20 DIAGNOSIS — K55.9 VASCULAR DISORDER OF INTESTINE, UNSPECIFIED: ICD-10-CM

## 2019-06-20 DIAGNOSIS — Z79.82 LONG TERM (CURRENT) USE OF ASPIRIN: ICD-10-CM

## 2019-06-20 DIAGNOSIS — I50.32 CHRONIC DIASTOLIC (CONGESTIVE) HEART FAILURE: ICD-10-CM

## 2019-06-20 DIAGNOSIS — K64.8 OTHER HEMORRHOIDS: ICD-10-CM

## 2019-06-20 DIAGNOSIS — T46.1X1A POISONING BY CALCIUM-CHANNEL BLOCKERS, ACCIDENTAL (UNINTENTIONAL), INITIAL ENCOUNTER: ICD-10-CM

## 2019-06-20 DIAGNOSIS — I95.9 HYPOTENSION, UNSPECIFIED: ICD-10-CM

## 2019-06-20 DIAGNOSIS — I08.1 RHEUMATIC DISORDERS OF BOTH MITRAL AND TRICUSPID VALVES: ICD-10-CM

## 2019-06-20 DIAGNOSIS — F17.200 NICOTINE DEPENDENCE, UNSPECIFIED, UNCOMPLICATED: ICD-10-CM

## 2019-06-20 DIAGNOSIS — R55 SYNCOPE AND COLLAPSE: ICD-10-CM

## 2019-07-10 ENCOUNTER — APPOINTMENT (OUTPATIENT)
Dept: FAMILY MEDICINE | Facility: CLINIC | Age: 64
End: 2019-07-10
Payer: MEDICARE

## 2019-07-10 VITALS
SYSTOLIC BLOOD PRESSURE: 104 MMHG | HEIGHT: 72 IN | TEMPERATURE: 97.6 F | BODY MASS INDEX: 34.4 KG/M2 | OXYGEN SATURATION: 96 % | WEIGHT: 254 LBS | HEART RATE: 82 BPM | DIASTOLIC BLOOD PRESSURE: 68 MMHG

## 2019-07-10 PROCEDURE — 99214 OFFICE O/P EST MOD 30 MIN: CPT

## 2019-07-10 RX ORDER — LISINOPRIL 5 MG/1
5 TABLET ORAL
Qty: 90 | Refills: 0 | Status: DISCONTINUED | COMMUNITY
Start: 2018-02-05 | End: 2019-07-10

## 2019-07-10 RX ORDER — METOPROLOL SUCCINATE 100 MG/1
100 TABLET, EXTENDED RELEASE ORAL
Qty: 90 | Refills: 0 | Status: DISCONTINUED | COMMUNITY
Start: 2018-03-01 | End: 2019-07-10

## 2019-07-10 NOTE — HISTORY OF PRESENT ILLNESS
[Admitted on: ___] : The patient was admitted on [unfilled] [Post-hospitalization from ___ Hospital] : Post-hospitalization from [unfilled] Hospital [Discharged on ___] : discharged on [unfilled] [Radiology Findings] : radiology findings [Pertinent Labs] : pertinent labs [Discharge Summary] : discharge summary [Discharge Med List] : discharge medication list [Med Reconciliation] : medication reconciliation has been completed [Patient Contacted By: ____] : and contacted by [unfilled] [FreeTextEntry2] : STONE is a 64 year male here for HFU. Was admitted to [facility] 6/10/19 and discharged 6/17/19 for internal bleeding due to stomach ulcer. Lost 5 liters of blood. Pt has d/c EtOH/tobacco use since discharge. Diltiazem decreased to 90 mg. Lisinopril and metoprolol d/c. \par

## 2019-07-10 NOTE — PHYSICAL EXAM
[No Acute Distress] : no acute distress [Well Nourished] : well nourished [Well Developed] : well developed [Well-Appearing] : well-appearing [Normal Sclera/Conjunctiva] : normal sclera/conjunctiva [PERRL] : pupils equal round and reactive to light [EOMI] : extraocular movements intact [Normal Outer Ear/Nose] : the outer ears and nose were normal in appearance [No JVD] : no jugular venous distention [Normal Oropharynx] : the oropharynx was normal [No Lymphadenopathy] : no lymphadenopathy [Supple] : supple [Thyroid Normal, No Nodules] : the thyroid was normal and there were no nodules present [No Respiratory Distress] : no respiratory distress  [Clear to Auscultation] : lungs were clear to auscultation bilaterally [No Accessory Muscle Use] : no accessory muscle use [Normal Rate] : normal rate  [Normal S1, S2] : normal S1 and S2 [No Murmur] : no murmur heard [Regular Rhythm] : with a regular rhythm [No Carotid Bruits] : no carotid bruits [No Abdominal Bruit] : a ~M bruit was not heard ~T in the abdomen [No Varicosities] : no varicosities [Pedal Pulses Present] : the pedal pulses are present [No Edema] : there was no peripheral edema [No Palpable Aorta] : no palpable aorta [No Extremity Clubbing/Cyanosis] : no extremity clubbing/cyanosis [Soft] : abdomen soft [Non Tender] : non-tender [Non-distended] : non-distended [No HSM] : no HSM [No Masses] : no abdominal mass palpated [Normal Posterior Cervical Nodes] : no posterior cervical lymphadenopathy [Normal Bowel Sounds] : normal bowel sounds [Normal Anterior Cervical Nodes] : no anterior cervical lymphadenopathy [No CVA Tenderness] : no CVA  tenderness [No Spinal Tenderness] : no spinal tenderness [No Joint Swelling] : no joint swelling [No Rash] : no rash [Grossly Normal Strength/Tone] : grossly normal strength/tone [Coordination Grossly Intact] : coordination grossly intact [Normal Gait] : normal gait [No Focal Deficits] : no focal deficits [Deep Tendon Reflexes (DTR)] : deep tendon reflexes were 2+ and symmetric [Normal Affect] : the affect was normal [Normal Insight/Judgement] : insight and judgment were intact

## 2019-07-10 NOTE — PLAN
[FreeTextEntry1] : - Blood work today\par - Follow up with cardiologist 7/15/19\par - Follow up with  in two weeks\par - Follow up with GI ()\par - GI referral\par - Iron supplements daily \par - Follow up in 1 month\par

## 2019-07-10 NOTE — END OF VISIT
[FreeTextEntry3] : Medical record entries made by the scribe today today, were at my direction and personally dictated to them by me, Dr. Augusta Vasquez on Jul 10, 2019. I have reviewed the chart and agree that the record accurately reflects my personal performance of the history, physical exam, assessment, and plan.\par \par

## 2019-07-10 NOTE — ADDENDUM
[FreeTextEntry1] : I, Mary Carmona acting as a scribe for Dr. Augusta Vasquez on Jul 10, 2019  at 10:57 AM\par

## 2019-07-11 LAB
ALBUMIN SERPL ELPH-MCNC: 3.4 G/DL
ALP BLD-CCNC: 98 U/L
ALT SERPL-CCNC: 8 U/L
ANION GAP SERPL CALC-SCNC: 11 MMOL/L
AST SERPL-CCNC: 14 U/L
BASOPHILS # BLD AUTO: 0.01 K/UL
BASOPHILS NFR BLD AUTO: 0.1 %
BILIRUB SERPL-MCNC: 0.6 MG/DL
BUN SERPL-MCNC: 12 MG/DL
CALCIUM SERPL-MCNC: 8.3 MG/DL
CHLORIDE SERPL-SCNC: 96 MMOL/L
CO2 SERPL-SCNC: 27 MMOL/L
CREAT SERPL-MCNC: 0.86 MG/DL
EOSINOPHIL # BLD AUTO: 0.48 K/UL
EOSINOPHIL NFR BLD AUTO: 6.6 %
FERRITIN SERPL-MCNC: 43 NG/ML
GLUCOSE SERPL-MCNC: 132 MG/DL
HCT VFR BLD CALC: 30.7 %
HGB BLD-MCNC: 9 G/DL
IMM GRANULOCYTES NFR BLD AUTO: 0.3 %
IRON SATN MFR SERPL: 5 %
IRON SERPL-MCNC: 18 UG/DL
LYMPHOCYTES # BLD AUTO: 1.24 K/UL
LYMPHOCYTES NFR BLD AUTO: 17 %
MAN DIFF?: NORMAL
MCHC RBC-ENTMCNC: 28 PG
MCHC RBC-ENTMCNC: 29.3 GM/DL
MCV RBC AUTO: 95.6 FL
MONOCYTES # BLD AUTO: 0.54 K/UL
MONOCYTES NFR BLD AUTO: 7.4 %
NEUTROPHILS # BLD AUTO: 5 K/UL
NEUTROPHILS NFR BLD AUTO: 68.6 %
PLATELET # BLD AUTO: 154 K/UL
POTASSIUM SERPL-SCNC: 3.5 MMOL/L
PROT SERPL-MCNC: 6.3 G/DL
RBC # BLD: 3.21 M/UL
RBC # FLD: 14.9 %
SODIUM SERPL-SCNC: 134 MMOL/L
TIBC SERPL-MCNC: 342 UG/DL
UIBC SERPL-MCNC: 324 UG/DL
WBC # FLD AUTO: 7.29 K/UL

## 2019-07-15 ENCOUNTER — APPOINTMENT (OUTPATIENT)
Dept: CARDIOLOGY | Facility: CLINIC | Age: 64
End: 2019-07-15

## 2019-07-22 ENCOUNTER — NON-APPOINTMENT (OUTPATIENT)
Age: 64
End: 2019-07-22

## 2019-07-22 ENCOUNTER — APPOINTMENT (OUTPATIENT)
Dept: CARDIOLOGY | Facility: CLINIC | Age: 64
End: 2019-07-22
Payer: MEDICARE

## 2019-07-22 VITALS
SYSTOLIC BLOOD PRESSURE: 115 MMHG | OXYGEN SATURATION: 94 % | HEART RATE: 88 BPM | BODY MASS INDEX: 34.27 KG/M2 | WEIGHT: 253 LBS | HEIGHT: 72 IN | DIASTOLIC BLOOD PRESSURE: 61 MMHG

## 2019-07-22 DIAGNOSIS — Z72.0 TOBACCO USE: ICD-10-CM

## 2019-07-22 PROCEDURE — 93000 ELECTROCARDIOGRAM COMPLETE: CPT

## 2019-07-22 PROCEDURE — 99214 OFFICE O/P EST MOD 30 MIN: CPT | Mod: 25

## 2019-07-22 NOTE — REASON FOR VISIT
[Atrial Fibrillation] : atrial fibrillation [Follow-Up - Clinic] : a clinic follow-up of [Hypertension] : hypertension [Other: _____] : [unfilled]

## 2019-07-26 ENCOUNTER — APPOINTMENT (OUTPATIENT)
Dept: ELECTROPHYSIOLOGY | Facility: CLINIC | Age: 64
End: 2019-07-26

## 2019-08-02 ENCOUNTER — APPOINTMENT (OUTPATIENT)
Dept: FAMILY MEDICINE | Facility: CLINIC | Age: 64
End: 2019-08-02

## 2019-08-13 PROBLEM — Z72.0 TOBACCO ABUSE: Status: ACTIVE | Noted: 2017-08-28

## 2019-08-13 NOTE — DISCUSSION/SUMMARY
[FreeTextEntry1] : 64 year old gentleman with history of persistent atrial fibrillation on rate control now s/p Watchman device, HTN, alcoholism, recurrent falls s/p BKA, CHF with preserved LV function and pulmonary hypertension previously.  Recent hospitalization for GIB - now off AC\par -He has prior stroke noted on MRI and high risk of AF related thromboembolism (CHADSVASc =4). Although AC would be ideal, he is a poor candidate given his alcohol abuse and multiple falls and is now s/p Watchman\par - c/w ASA\par -continue rate control for AF with diltiazem \par -TTE 10/29/18 shows low-normal LV function, mild MS, mod-sev MR, sev LAE, sev TR, sev pHTN. \par - Nuclear stress test 12/2018 showed normal myocardial perfusion\par -f/u with me in 4 mnths \par -The described plan was discussed with the pt.  All questions and concerns were addressed to the best of my knowledge. \par

## 2019-08-13 NOTE — PHYSICAL EXAM
[General Appearance - Well Developed] : well developed [Normal Appearance] : normal appearance [General Appearance - Well Nourished] : well nourished [General Appearance - In No Acute Distress] : no acute distress [Normal Conjunctiva] : the conjunctiva exhibited no abnormalities [Eyelids - No Xanthelasma] : the eyelids demonstrated no xanthelasmas [Normal Oral Mucosa] : normal oral mucosa [No Oral Pallor] : no oral pallor [No Oral Cyanosis] : no oral cyanosis [JVD Elevated _____cm] : JVD elevated [unfilled] ~U cm above clavicle [Normal Jugular Venous A Waves Present] : normal jugular venous A waves present [No Jugular Venous [place] A Waves] : no jugular venous [place] A waves [Respiration, Rhythm And Depth] : normal respiratory rhythm and effort [Exaggerated Use Of Accessory Muscles For Inspiration] : no accessory muscle use [Heart Sounds] : normal S1 and S2 [Murmurs] : no murmurs present [Abdomen Soft] : soft [Abdomen Tenderness] : non-tender [Abdomen Mass (___ Cm)] : no abdominal mass palpated [Nail Clubbing] : no clubbing of the fingernails [Cyanosis, Localized] : no localized cyanosis [Petechial Hemorrhages (___cm)] : no petechial hemorrhages [] : no ischemic changes [Oriented To Time, Place, And Person] : oriented to person, place, and time [Impaired Insight] : insight and judgment were intact [FreeTextEntry1] : s/p left BKA with LE prosthesis

## 2019-08-13 NOTE — HISTORY OF PRESENT ILLNESS
[FreeTextEntry1] : 63 y/o M smoker (1PPD) who presents today after recent hospitalization for GIB - needed multiple transfusions.  He is feeling better since discharge. He still drinks 1-2 times per week, states 1-2 drinks. \par He is unsure exactly what medications he is currently taking but is compliant with meds.  Has no specific complaints today\par  \par He has PMH of persistent atrial fibrillation on rate control, HTN, alcoholism, recurrent falls s/p BKA, and CHF with preserved LV function.  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 He recently had Watchman device placed and was started on AC.  Shortly after he had GIB requiring several blood transfusions.  He had repeat MYA done which showed well seated Watchman device and no evidence thrombus - AC has since been stopped and he is now only on ASA.\par He notes that last week had episode lightheadedness and that his BP has been low.  Also notes exertional dyspnea\par He follows with Dr Clark \par \par Of note, 2018 he had a prolonged hospitalization with respiratory decompensation and had very elevated pulmonary pressures at that time. \par Smokes 1 PPD\par \par

## 2019-08-13 NOTE — REVIEW OF SYSTEMS
[see HPI] : see HPI [Negative] : Endocrine [Fever] : no fever [Recent Weight Gain (___ Lbs)] : no recent weight gain [Chills] : no chills [Feeling Fatigued] : not feeling fatigued [Shortness Of Breath] : no shortness of breath [Dyspnea on exertion] : not dyspnea during exertion [Chest Pain] : no chest pain [Lower Ext Edema] : no extremity edema [Palpitations] : no palpitations [Dizziness] : no dizziness [Convulsions] : no convulsions

## 2019-08-24 ENCOUNTER — INPATIENT (INPATIENT)
Facility: HOSPITAL | Age: 64
LOS: 9 days | Discharge: HOME | DRG: 291 | End: 2019-09-03
Attending: INTERNAL MEDICINE | Admitting: INTERNAL MEDICINE
Payer: MEDICARE

## 2019-08-24 VITALS
HEIGHT: 72 IN | RESPIRATION RATE: 20 BRPM | SYSTOLIC BLOOD PRESSURE: 152 MMHG | WEIGHT: 248.9 LBS | HEART RATE: 126 BPM | OXYGEN SATURATION: 96 % | DIASTOLIC BLOOD PRESSURE: 83 MMHG | TEMPERATURE: 100 F

## 2019-08-24 DIAGNOSIS — Z89.512 ACQUIRED ABSENCE OF LEFT LEG BELOW KNEE: Chronic | ICD-10-CM

## 2019-08-24 DIAGNOSIS — I48.91 UNSPECIFIED ATRIAL FIBRILLATION: ICD-10-CM

## 2019-08-24 DIAGNOSIS — Z95.818 PRESENCE OF OTHER CARDIAC IMPLANTS AND GRAFTS: Chronic | ICD-10-CM

## 2019-08-24 DIAGNOSIS — S88.119A COMPLETE TRAUMATIC AMPUTATION AT LEVEL BETWEEN KNEE AND ANKLE, UNSPECIFIED LOWER LEG, INITIAL ENCOUNTER: Chronic | ICD-10-CM

## 2019-08-24 LAB
ALBUMIN SERPL ELPH-MCNC: 3.8 G/DL — SIGNIFICANT CHANGE UP (ref 3.3–5.2)
ALP SERPL-CCNC: 115 U/L — SIGNIFICANT CHANGE UP (ref 40–120)
ALT FLD-CCNC: 7 U/L — SIGNIFICANT CHANGE UP
ANION GAP SERPL CALC-SCNC: 13 MMOL/L — SIGNIFICANT CHANGE UP (ref 5–17)
APPEARANCE UR: CLEAR — SIGNIFICANT CHANGE UP
APTT BLD: 32.4 SEC — SIGNIFICANT CHANGE UP (ref 27.5–36.3)
AST SERPL-CCNC: 16 U/L — SIGNIFICANT CHANGE UP
BACTERIA # UR AUTO: NEGATIVE — SIGNIFICANT CHANGE UP
BASOPHILS # BLD AUTO: 0.03 K/UL — SIGNIFICANT CHANGE UP (ref 0–0.2)
BASOPHILS NFR BLD AUTO: 0.3 % — SIGNIFICANT CHANGE UP (ref 0–2)
BILIRUB SERPL-MCNC: 1.1 MG/DL — SIGNIFICANT CHANGE UP (ref 0.4–2)
BILIRUB UR-MCNC: NEGATIVE — SIGNIFICANT CHANGE UP
BUN SERPL-MCNC: 13 MG/DL — SIGNIFICANT CHANGE UP (ref 8–20)
CALCIUM SERPL-MCNC: 9.1 MG/DL — SIGNIFICANT CHANGE UP (ref 8.6–10.2)
CHLORIDE SERPL-SCNC: 98 MMOL/L — SIGNIFICANT CHANGE UP (ref 98–107)
CO2 SERPL-SCNC: 27 MMOL/L — SIGNIFICANT CHANGE UP (ref 22–29)
COLOR SPEC: YELLOW — SIGNIFICANT CHANGE UP
CREAT SERPL-MCNC: 0.65 MG/DL — SIGNIFICANT CHANGE UP (ref 0.5–1.3)
DIFF PNL FLD: ABNORMAL
EOSINOPHIL # BLD AUTO: 0.15 K/UL — SIGNIFICANT CHANGE UP (ref 0–0.5)
EOSINOPHIL NFR BLD AUTO: 1.7 % — SIGNIFICANT CHANGE UP (ref 0–6)
EPI CELLS # UR: NEGATIVE — SIGNIFICANT CHANGE UP
GLUCOSE SERPL-MCNC: 121 MG/DL — HIGH (ref 70–115)
GLUCOSE UR QL: NEGATIVE MG/DL — SIGNIFICANT CHANGE UP
HCT VFR BLD CALC: 31.9 % — LOW (ref 39–50)
HGB BLD-MCNC: 9.5 G/DL — LOW (ref 13–17)
IMM GRANULOCYTES NFR BLD AUTO: 0.5 % — SIGNIFICANT CHANGE UP (ref 0–1.5)
INR BLD: 1.49 RATIO — HIGH (ref 0.88–1.16)
KETONES UR-MCNC: NEGATIVE — SIGNIFICANT CHANGE UP
LACTATE BLDV-MCNC: 1.4 MMOL/L — SIGNIFICANT CHANGE UP (ref 0.5–2)
LEUKOCYTE ESTERASE UR-ACNC: NEGATIVE — SIGNIFICANT CHANGE UP
LYMPHOCYTES # BLD AUTO: 0.71 K/UL — LOW (ref 1–3.3)
LYMPHOCYTES # BLD AUTO: 8.2 % — LOW (ref 13–44)
MCHC RBC-ENTMCNC: 24.1 PG — LOW (ref 27–34)
MCHC RBC-ENTMCNC: 29.8 GM/DL — LOW (ref 32–36)
MCV RBC AUTO: 80.8 FL — SIGNIFICANT CHANGE UP (ref 80–100)
MONOCYTES # BLD AUTO: 0.65 K/UL — SIGNIFICANT CHANGE UP (ref 0–0.9)
MONOCYTES NFR BLD AUTO: 7.5 % — SIGNIFICANT CHANGE UP (ref 2–14)
NEUTROPHILS # BLD AUTO: 7.06 K/UL — SIGNIFICANT CHANGE UP (ref 1.8–7.4)
NEUTROPHILS NFR BLD AUTO: 81.8 % — HIGH (ref 43–77)
NITRITE UR-MCNC: NEGATIVE — SIGNIFICANT CHANGE UP
PH UR: 7 — SIGNIFICANT CHANGE UP (ref 5–8)
PLATELET # BLD AUTO: 150 K/UL — SIGNIFICANT CHANGE UP (ref 150–400)
POTASSIUM SERPL-MCNC: 3.7 MMOL/L — SIGNIFICANT CHANGE UP (ref 3.5–5.3)
POTASSIUM SERPL-SCNC: 3.7 MMOL/L — SIGNIFICANT CHANGE UP (ref 3.5–5.3)
PROT SERPL-MCNC: 7.5 G/DL — SIGNIFICANT CHANGE UP (ref 6.6–8.7)
PROT UR-MCNC: 30 MG/DL
PROTHROM AB SERPL-ACNC: 17.4 SEC — HIGH (ref 10–12.9)
RBC # BLD: 3.95 M/UL — LOW (ref 4.2–5.8)
RBC # FLD: 16.7 % — HIGH (ref 10.3–14.5)
RBC CASTS # UR COMP ASSIST: NEGATIVE /HPF — SIGNIFICANT CHANGE UP (ref 0–4)
SODIUM SERPL-SCNC: 138 MMOL/L — SIGNIFICANT CHANGE UP (ref 135–145)
SP GR SPEC: 1 — LOW (ref 1.01–1.02)
TROPONIN T SERPL-MCNC: <0.01 NG/ML — SIGNIFICANT CHANGE UP (ref 0–0.06)
UROBILINOGEN FLD QL: NEGATIVE MG/DL — SIGNIFICANT CHANGE UP
WBC # BLD: 8.64 K/UL — SIGNIFICANT CHANGE UP (ref 3.8–10.5)
WBC # FLD AUTO: 8.64 K/UL — SIGNIFICANT CHANGE UP (ref 3.8–10.5)
WBC UR QL: SIGNIFICANT CHANGE UP

## 2019-08-24 PROCEDURE — 71250 CT THORAX DX C-: CPT | Mod: 26

## 2019-08-24 PROCEDURE — 71045 X-RAY EXAM CHEST 1 VIEW: CPT | Mod: 26

## 2019-08-24 PROCEDURE — 99053 MED SERV 10PM-8AM 24 HR FAC: CPT

## 2019-08-24 PROCEDURE — 99223 1ST HOSP IP/OBS HIGH 75: CPT

## 2019-08-24 PROCEDURE — 99285 EMERGENCY DEPT VISIT HI MDM: CPT

## 2019-08-24 PROCEDURE — 93010 ELECTROCARDIOGRAM REPORT: CPT

## 2019-08-24 RX ORDER — SODIUM CHLORIDE 9 MG/ML
1000 INJECTION INTRAMUSCULAR; INTRAVENOUS; SUBCUTANEOUS
Refills: 0 | Status: DISCONTINUED | OUTPATIENT
Start: 2019-08-24 | End: 2019-08-25

## 2019-08-24 RX ORDER — DILTIAZEM HCL 120 MG
20 CAPSULE, EXT RELEASE 24 HR ORAL ONCE
Refills: 0 | Status: COMPLETED | OUTPATIENT
Start: 2019-08-24 | End: 2019-08-24

## 2019-08-24 RX ORDER — DILTIAZEM HCL 120 MG
90 CAPSULE, EXT RELEASE 24 HR ORAL THREE TIMES A DAY
Refills: 0 | Status: DISCONTINUED | OUTPATIENT
Start: 2019-08-24 | End: 2019-09-03

## 2019-08-24 RX ORDER — GABAPENTIN 400 MG/1
400 CAPSULE ORAL THREE TIMES A DAY
Refills: 0 | Status: DISCONTINUED | OUTPATIENT
Start: 2019-08-24 | End: 2019-09-03

## 2019-08-24 RX ORDER — DILTIAZEM HCL 120 MG
30 CAPSULE, EXT RELEASE 24 HR ORAL ONCE
Refills: 0 | Status: COMPLETED | OUTPATIENT
Start: 2019-08-24 | End: 2019-08-24

## 2019-08-24 RX ORDER — ACETAMINOPHEN 500 MG
975 TABLET ORAL ONCE
Refills: 0 | Status: COMPLETED | OUTPATIENT
Start: 2019-08-24 | End: 2019-08-24

## 2019-08-24 RX ORDER — ALPRAZOLAM 0.25 MG
0.5 TABLET ORAL ONCE
Refills: 0 | Status: DISCONTINUED | OUTPATIENT
Start: 2019-08-24 | End: 2019-08-24

## 2019-08-24 RX ORDER — LEVALBUTEROL 1.25 MG/.5ML
1.25 SOLUTION, CONCENTRATE RESPIRATORY (INHALATION) ONCE
Refills: 0 | Status: COMPLETED | OUTPATIENT
Start: 2019-08-24 | End: 2019-08-24

## 2019-08-24 RX ORDER — FUROSEMIDE 40 MG
40 TABLET ORAL DAILY
Refills: 0 | Status: DISCONTINUED | OUTPATIENT
Start: 2019-08-24 | End: 2019-09-03

## 2019-08-24 RX ORDER — FERROUS SULFATE 325(65) MG
325 TABLET ORAL DAILY
Refills: 0 | Status: DISCONTINUED | OUTPATIENT
Start: 2019-08-24 | End: 2019-09-03

## 2019-08-24 RX ORDER — KETOROLAC TROMETHAMINE 30 MG/ML
30 SYRINGE (ML) INJECTION ONCE
Refills: 0 | Status: DISCONTINUED | OUTPATIENT
Start: 2019-08-24 | End: 2019-08-24

## 2019-08-24 RX ORDER — IPRATROPIUM/ALBUTEROL SULFATE 18-103MCG
3 AEROSOL WITH ADAPTER (GRAM) INHALATION EVERY 4 HOURS
Refills: 0 | Status: DISCONTINUED | OUTPATIENT
Start: 2019-08-24 | End: 2019-09-01

## 2019-08-24 RX ORDER — PANTOPRAZOLE SODIUM 20 MG/1
40 TABLET, DELAYED RELEASE ORAL DAILY
Refills: 0 | Status: DISCONTINUED | OUTPATIENT
Start: 2019-08-24 | End: 2019-09-03

## 2019-08-24 RX ORDER — ENOXAPARIN SODIUM 100 MG/ML
40 INJECTION SUBCUTANEOUS DAILY
Refills: 0 | Status: DISCONTINUED | OUTPATIENT
Start: 2019-08-24 | End: 2019-09-03

## 2019-08-24 RX ORDER — IPRATROPIUM BROMIDE 0.2 MG/ML
500 SOLUTION, NON-ORAL INHALATION ONCE
Refills: 0 | Status: COMPLETED | OUTPATIENT
Start: 2019-08-24 | End: 2019-08-24

## 2019-08-24 RX ORDER — TAMSULOSIN HYDROCHLORIDE 0.4 MG/1
0.4 CAPSULE ORAL AT BEDTIME
Refills: 0 | Status: DISCONTINUED | OUTPATIENT
Start: 2019-08-24 | End: 2019-09-03

## 2019-08-24 RX ORDER — SODIUM CHLORIDE 9 MG/ML
3500 INJECTION, SOLUTION INTRAVENOUS ONCE
Refills: 0 | Status: COMPLETED | OUTPATIENT
Start: 2019-08-24 | End: 2019-08-24

## 2019-08-24 RX ORDER — CLOPIDOGREL BISULFATE 75 MG/1
75 TABLET, FILM COATED ORAL DAILY
Refills: 0 | Status: DISCONTINUED | OUTPATIENT
Start: 2019-08-24 | End: 2019-09-03

## 2019-08-24 RX ADMIN — GABAPENTIN 400 MILLIGRAM(S): 400 CAPSULE ORAL at 14:24

## 2019-08-24 RX ADMIN — SODIUM CHLORIDE 100 MILLILITER(S): 9 INJECTION INTRAMUSCULAR; INTRAVENOUS; SUBCUTANEOUS at 14:23

## 2019-08-24 RX ADMIN — CLOPIDOGREL BISULFATE 75 MILLIGRAM(S): 75 TABLET, FILM COATED ORAL at 12:02

## 2019-08-24 RX ADMIN — Medication 325 MILLIGRAM(S): at 12:01

## 2019-08-24 RX ADMIN — Medication 30 MILLIGRAM(S): at 04:41

## 2019-08-24 RX ADMIN — Medication 3 MILLILITER(S): at 21:05

## 2019-08-24 RX ADMIN — Medication 40 MILLIGRAM(S): at 19:57

## 2019-08-24 RX ADMIN — TAMSULOSIN HYDROCHLORIDE 0.4 MILLIGRAM(S): 0.4 CAPSULE ORAL at 21:50

## 2019-08-24 RX ADMIN — Medication 90 MILLIGRAM(S): at 21:49

## 2019-08-24 RX ADMIN — Medication 30 MILLIGRAM(S): at 02:14

## 2019-08-24 RX ADMIN — LEVALBUTEROL 1.25 MILLIGRAM(S): 1.25 SOLUTION, CONCENTRATE RESPIRATORY (INHALATION) at 05:02

## 2019-08-24 RX ADMIN — PANTOPRAZOLE SODIUM 40 MILLIGRAM(S): 20 TABLET, DELAYED RELEASE ORAL at 12:01

## 2019-08-24 RX ADMIN — Medication 125 MILLIGRAM(S): at 08:34

## 2019-08-24 RX ADMIN — ENOXAPARIN SODIUM 40 MILLIGRAM(S): 100 INJECTION SUBCUTANEOUS at 12:01

## 2019-08-24 RX ADMIN — SODIUM CHLORIDE 3500 MILLILITER(S): 9 INJECTION, SOLUTION INTRAVENOUS at 04:44

## 2019-08-24 RX ADMIN — Medication 30 MILLIGRAM(S): at 05:20

## 2019-08-24 RX ADMIN — SODIUM CHLORIDE 100 MILLILITER(S): 9 INJECTION INTRAMUSCULAR; INTRAVENOUS; SUBCUTANEOUS at 19:56

## 2019-08-24 RX ADMIN — Medication 40 MILLIGRAM(S): at 14:23

## 2019-08-24 RX ADMIN — SODIUM CHLORIDE 100 MILLILITER(S): 9 INJECTION INTRAMUSCULAR; INTRAVENOUS; SUBCUTANEOUS at 08:53

## 2019-08-24 RX ADMIN — SODIUM CHLORIDE 3500 MILLILITER(S): 9 INJECTION, SOLUTION INTRAVENOUS at 02:14

## 2019-08-24 RX ADMIN — Medication 3 MILLILITER(S): at 15:51

## 2019-08-24 RX ADMIN — Medication 20 MILLIGRAM(S): at 02:14

## 2019-08-24 RX ADMIN — Medication 975 MILLIGRAM(S): at 02:14

## 2019-08-24 RX ADMIN — Medication 0.5 MILLIGRAM(S): at 07:17

## 2019-08-24 RX ADMIN — Medication 500 MICROGRAM(S): at 04:41

## 2019-08-24 RX ADMIN — Medication 975 MILLIGRAM(S): at 04:19

## 2019-08-24 RX ADMIN — GABAPENTIN 400 MILLIGRAM(S): 400 CAPSULE ORAL at 21:49

## 2019-08-24 RX ADMIN — Medication 90 MILLIGRAM(S): at 14:25

## 2019-08-24 RX ADMIN — Medication 3 MILLILITER(S): at 12:39

## 2019-08-24 NOTE — H&P ADULT - HISTORY OF PRESENT ILLNESS
64 year old male with PMH of afib s/p watchman device , history of alcoholism in past , now sober , h/o tobacco abuse stopped many years ago , COPD , hx of BKA due to injury , diastolic CHF presented to hospital with c/o SOB that started suddenly progressively got worse , he felt chills , night sweats , no fever at home but was febrile in ED , code sepsis called , cultures sent , started on antibiotics .  at the time of my evaluation patient was resting in bed , speaking in full sentences , denies sick contacts , no palpitations . no significant cough , no phlegm production .  no nausea vomiting , no diarrhea , constipation.   CT chest negative for PE . showed pulmonary fibrosis , pulmonary HTN

## 2019-08-24 NOTE — H&P ADULT - NSHPPHYSICALEXAM_GEN_ALL_CORE
GENERAL:  Well-appearing, not in acute distress, on nasal cannula   EYES:  Clear conjuctiva, extraocular movement intact  ENT: Moist mucous membranes  RESP:  Non-labored breathing pattern, lungs diminished air entry , rhonchi /rales BL at bases   CV: irregular rhythm , tachycardic on and off   GI: Soft, non-tender, non-distended  NEURO: Awake, alert, conversant, upper and lower extremity strength 5/5, light touch sensation grossly intact  PSYCH: Calm, cooperative  SKIN: No rash or lesions, warm and dry  s/p left BKA

## 2019-08-24 NOTE — H&P ADULT - ASSESSMENT
1. Possible CAP with sepsis   code sepsis in ER , cultures sent .  on levaquin as patient is allergic to cephalosporin.  urine pneumococcus and legionella sent.   and tele monitoring.    2. COPD execerbation  on duoneb , .  steroid IV .   close monitoring of respiratory status.    3. Acute hypoxic respiratory failure   on 3 L NC .  related to PNA and COPD .  continue with supplemental oxygen , wean as tolerated .    4. hx of afib   did present with RVR due to sepsis .  asymptomatic.  s/p watchman device , not maintained on blood thinners .  only on aspirin. continue with cardizem.    5. hx of alcohol abuse  has been sober.    6. hx of anemia   on ferrous sulfate .    7. hx of diastolic CHF   on lasix , will repeat ECHO.  if abnormal will request for cardio consult.    8.GERD  Protonix.    9.BPH   on flomax .    10. DVT prophylaxis   will start lovenox

## 2019-08-24 NOTE — ED ADULT NURSE NOTE - OBJECTIVE STATEMENT
Pt. c/o sob and dry cough x 1 day.  Pt. states cough is dry.  Pt. noted to be in rapid afib and febrile on arrival to ED. Code sepsis initiated and protocol followed.

## 2019-08-24 NOTE — ED ADULT NURSE NOTE - CHIEF COMPLAINT QUOTE
Pt c/o SOB since 530 pm, hx COPD. Pt slightly tachypneic in triage. Stated he is starting to feel better. Hx of COPD. Tachycardic. Pt placed in Critical care for EKG.

## 2019-08-24 NOTE — H&P ADULT - NSHPLABSRESULTS_GEN_ALL_CORE
LABS:                        9.5    8.64  )-----------( 150      ( 24 Aug 2019 02:11 )             31.9         138  |  98  |  13.0  ----------------------------<  121<H>  3.7   |  27.0  |  0.65    Ca    9.1      24 Aug 2019 02:11    TPro  7.5  /  Alb  3.8  /  TBili  1.1  /  DBili  x   /  AST  16  /  ALT  7   /  AlkPhos  115      PT/INR - ( 24 Aug 2019 02:11 )   PT: 17.4 sec;   INR: 1.49 ratio         PTT - ( 24 Aug 2019 02:11 )  PTT:32.4 sec    LIVER FUNCTIONS - ( 24 Aug 2019 02:11 )  Alb: 3.8 g/dL / Pro: 7.5 g/dL / ALK PHOS: 115 U/L / ALT: 7 U/L / AST: 16 U/L / GGT: x           Urinalysis Basic - ( 24 Aug 2019 04:44 )    Color: Yellow / Appearance: Clear / S.005 / pH: x  Gluc: x / Ketone: Negative  / Bili: Negative / Urobili: Negative mg/dL   Blood: x / Protein: 30 mg/dL / Nitrite: Negative   Leuk Esterase: Negative / RBC: Negative /HPF / WBC 0-2   Sq Epi: x / Non Sq Epi: Negative / Bacteria: Negative

## 2019-08-24 NOTE — ED PROVIDER NOTE - OBJECTIVE STATEMENT
63 y/o M pt with hx of EtOH abuse, anemia, Afib, CHF, COPD, cirrhosis, HTN, meningitis, and L BKA presents to ED c/o SOB that began today. Pt is not on O2 at home. Denies chills, CP, abd pain, n/v/d. No further complaints at this time.

## 2019-08-24 NOTE — ED PROVIDER NOTE - CLINICAL SUMMARY MEDICAL DECISION MAKING FREE TEXT BOX
pt with rapid afib, now rate controlled, requiring supplemental oxygen likely secondary to COPd/pna.

## 2019-08-24 NOTE — ED PROVIDER NOTE - PROGRESS NOTE DETAILS
Artie Chinchilla MD: Patient c/o worsening anxious symptoms, [place] Lorenzo Lagunas. Ordered 0.5 Xanax.

## 2019-08-24 NOTE — ED ADULT NURSE REASSESSMENT NOTE - NS ED NURSE REASSESS COMMENT FT1
pt status unchanged, refer to flowsheet and chart, pt safety maintained, pt hemodynamically stable, pt to be admitted, will continue to monitor

## 2019-08-24 NOTE — ED ADULT NURSE NOTE - NSIMPLEMENTINTERV_GEN_ALL_ED
Implemented All Fall Risk Interventions:  [place] to call system. Call bell, personal items and telephone within reach. Instruct patient to call for assistance. Room bathroom lighting operational. Non-slip footwear when patient is off stretcher. Physically safe environment: no spills, clutter or unnecessary equipment. Stretcher in lowest position, wheels locked, appropriate side rails in place. Provide visual cue, wrist band, yellow gown, etc. Monitor gait and stability. Monitor for mental status changes and reorient to person, place, and time. Review medications for side effects contributing to fall risk. Reinforce activity limits and safety measures with patient and family.

## 2019-08-24 NOTE — ED ADULT NURSE NOTE - BREATH SOUNDS, RIGHT
[facility]      Patient Name:  Paris Nguyen  /Age:  1982 (36 year old)      Intervention: The writer attempted to reach out to the patient regarding psychotherapy referral placed by Chin Cerda MD on 18. Left a voice message and providing clinic contact information and asking the patient to please call back if she is interested in scheduling.    Status of Referral: Pending     Plan: The writer will attempt to reach the patient again in several days.      Linnette Dickerson,      Psychiatry Clinic  
wheezes

## 2019-08-24 NOTE — PATIENT PROFILE ADULT - NSPROGENDIFFINTUB_GEN_A_NUR
Pt trached x2 as per daughter Adela.  Adela states "He has a lot of scar tissue from being trached"./previously intubated - problems

## 2019-08-24 NOTE — ED ADULT TRIAGE NOTE - CHIEF COMPLAINT QUOTE
Pt c/o SOB since 530 pm, hx COPD. Pt slightly tachypneic in triage. Stated he is starting to feel better. Hx of COPD. Tachycardic. Pt placed in Critical care for EKG. Pt c/o SOB since 530 pm, hx COPD. Pt slightly tachypneic in triage. Stated he is starting to feel better. Hx of COPD. Tachycardic. Pt placed in Critical care for EKG. Code sepsis called.

## 2019-08-24 NOTE — PATIENT PROFILE ADULT - FUNCTIONAL SCREEN CURRENT LEVEL: SWALLOWING (IF SCORE 2 OR MORE FOR ANY ITEM, CONSULT REHAB SERVICES), MLM)
0 = swallows foods/liquids without difficulty/PT NPO at present.  Pt's daughter states that pt has no difficulty chewing or swallowing".

## 2019-08-25 DIAGNOSIS — I50.32 CHRONIC DIASTOLIC (CONGESTIVE) HEART FAILURE: ICD-10-CM

## 2019-08-25 DIAGNOSIS — I27.20 PULMONARY HYPERTENSION, UNSPECIFIED: ICD-10-CM

## 2019-08-25 DIAGNOSIS — I48.91 UNSPECIFIED ATRIAL FIBRILLATION: ICD-10-CM

## 2019-08-25 LAB
CULTURE RESULTS: SIGNIFICANT CHANGE UP
GRAM STN FLD: SIGNIFICANT CHANGE UP
NT-PROBNP SERPL-SCNC: 3649 PG/ML — HIGH (ref 0–300)
SPECIMEN SOURCE: SIGNIFICANT CHANGE UP
SPECIMEN SOURCE: SIGNIFICANT CHANGE UP

## 2019-08-25 PROCEDURE — 93010 ELECTROCARDIOGRAM REPORT: CPT

## 2019-08-25 PROCEDURE — 99233 SBSQ HOSP IP/OBS HIGH 50: CPT | Mod: GC

## 2019-08-25 PROCEDURE — 99223 1ST HOSP IP/OBS HIGH 75: CPT

## 2019-08-25 RX ORDER — FUROSEMIDE 40 MG
40 TABLET ORAL ONCE
Refills: 0 | Status: COMPLETED | OUTPATIENT
Start: 2019-08-25 | End: 2019-08-25

## 2019-08-25 RX ORDER — IBUPROFEN 200 MG
400 TABLET ORAL ONCE
Refills: 0 | Status: COMPLETED | OUTPATIENT
Start: 2019-08-25 | End: 2019-08-25

## 2019-08-25 RX ORDER — ACETAMINOPHEN 500 MG
650 TABLET ORAL ONCE
Refills: 0 | Status: COMPLETED | OUTPATIENT
Start: 2019-08-25 | End: 2019-08-25

## 2019-08-25 RX ADMIN — GABAPENTIN 400 MILLIGRAM(S): 400 CAPSULE ORAL at 13:47

## 2019-08-25 RX ADMIN — Medication 3 MILLILITER(S): at 16:34

## 2019-08-25 RX ADMIN — Medication 3 MILLILITER(S): at 20:16

## 2019-08-25 RX ADMIN — ENOXAPARIN SODIUM 40 MILLIGRAM(S): 100 INJECTION SUBCUTANEOUS at 13:47

## 2019-08-25 RX ADMIN — Medication 650 MILLIGRAM(S): at 01:32

## 2019-08-25 RX ADMIN — TAMSULOSIN HYDROCHLORIDE 0.4 MILLIGRAM(S): 0.4 CAPSULE ORAL at 21:02

## 2019-08-25 RX ADMIN — Medication 40 MILLIGRAM(S): at 18:52

## 2019-08-25 RX ADMIN — SODIUM CHLORIDE 100 MILLILITER(S): 9 INJECTION INTRAMUSCULAR; INTRAVENOUS; SUBCUTANEOUS at 10:09

## 2019-08-25 RX ADMIN — Medication 650 MILLIGRAM(S): at 02:15

## 2019-08-25 RX ADMIN — Medication 400 MILLIGRAM(S): at 05:27

## 2019-08-25 RX ADMIN — Medication 90 MILLIGRAM(S): at 21:02

## 2019-08-25 RX ADMIN — Medication 40 MILLIGRAM(S): at 05:02

## 2019-08-25 RX ADMIN — GABAPENTIN 400 MILLIGRAM(S): 400 CAPSULE ORAL at 05:02

## 2019-08-25 RX ADMIN — Medication 90 MILLIGRAM(S): at 05:02

## 2019-08-25 RX ADMIN — GABAPENTIN 400 MILLIGRAM(S): 400 CAPSULE ORAL at 21:05

## 2019-08-25 RX ADMIN — Medication 325 MILLIGRAM(S): at 13:47

## 2019-08-25 RX ADMIN — PANTOPRAZOLE SODIUM 40 MILLIGRAM(S): 20 TABLET, DELAYED RELEASE ORAL at 13:46

## 2019-08-25 RX ADMIN — Medication 650 MILLIGRAM(S): at 20:25

## 2019-08-25 RX ADMIN — Medication 40 MILLIGRAM(S): at 13:47

## 2019-08-25 RX ADMIN — Medication 650 MILLIGRAM(S): at 19:55

## 2019-08-25 RX ADMIN — Medication 3 MILLILITER(S): at 01:09

## 2019-08-25 RX ADMIN — CLOPIDOGREL BISULFATE 75 MILLIGRAM(S): 75 TABLET, FILM COATED ORAL at 13:46

## 2019-08-25 RX ADMIN — Medication 90 MILLIGRAM(S): at 13:47

## 2019-08-25 RX ADMIN — Medication 3 MILLILITER(S): at 13:44

## 2019-08-25 RX ADMIN — Medication 3 MILLILITER(S): at 05:11

## 2019-08-25 RX ADMIN — Medication 3 MILLILITER(S): at 08:51

## 2019-08-25 NOTE — CONSULT NOTE ADULT - PROBLEM SELECTOR RECOMMENDATION 2
dyspnea: Multifactoria:  COPD/pulm Fibrosis, pulm HTN, and CHF  Transthoracic echocardiogram   diuresis.   nothing per orally after midnight. NPO after midnight. Nuclear stress test   oxygen.   consider right heart cath to confirm pulm pressures.  Secondary pulm HTN.  will not  as no Pulm HTN meds available in his case.   We can seee if he is vasoreactive.

## 2019-08-25 NOTE — CONSULT NOTE ADULT - PROBLEM SELECTOR RECOMMENDATION 9
ct current rate control meds. .ccbs   s/p watchman. not on anticoagulation   failed prior cardioversion.

## 2019-08-25 NOTE — PROGRESS NOTE ADULT - ASSESSMENT
64 year old male with PMH of afib s/p watchman device , history of alcoholism in past , now sober , h/o tobacco abuse stopped many years ago , COPD , hx of Left BKA due to injury , diastolic CHF presented to hospital with c/o SOB that started suddenly progressively got worse , he felt chills , night sweats , no fever at home but was febrile in ED , code sepsis called , cultures sent , started on antibiotics     1. Acute hypoxic respiratory failure likely due to combination of ADHF HFpEF + IPF - stopped IV, given lasix, on IV steroids, will need pulmonary eval in am, no evidence of PNA on CT chest, sepsis ruled out    2. Afib with RVR - rate controlled, s/p Watchman off AC    3. Anemia due to chronic Dx with Hx of LGIB and cirrhosis    4. Hx of ETOH abuse with cirrhosis    5. Right thyroid 1.4 cm lesion - outpt US    6. VTE proph - LMWH

## 2019-08-25 NOTE — PROGRESS NOTE ADULT - SUBJECTIVE AND OBJECTIVE BOX
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  CT chest negative for PE . showed pulmonary fibrosis , pulmonary HTN (24 Aug 2019 15:53)    INTERVAL HPI/OVERNIGHT EVENTS: denies SOB/palpitations, fevers resolved, Other ROS reviewed and neg     Vital Signs Last 24 Hrs  T(C): 37 (25 Aug 2019 15:23), Max: 37 (25 Aug 2019 15:23)  T(F): 98.6 (25 Aug 2019 15:23), Max: 98.6 (25 Aug 2019 15:23)  HR: 106 (25 Aug 2019 15:23) (80 - 106)  BP: 129/60 (25 Aug 2019 15:23) (120/77 - 133/70)  RR: 18 (25 Aug 2019 15:23) (17 - 18)  SpO2: 97% (25 Aug 2019 15:23) (96% - 99%)    CARDIAC MARKERS ( 24 Aug 2019 05:06 )  x     / <0.01 ng/mL / x     / x     / x                            9.5    8.64  )-----------( 150      ( 24 Aug 2019 02:11 )             31.9     24 Aug 2019 02:11    138    |  98     |  13.0   ----------------------------<  121    3.7     |  27.0   |  0.65     Ca    9.1        24 Aug 2019 02:11    TPro  7.5    /  Alb  3.8    /  TBili  1.1    /  DBili  x      /  AST  16     /  ALT  7      /  AlkPhos  115    24 Aug 2019 02:11    PT/INR - ( 24 Aug 2019 02:11 )   PT: 17.4 sec;   INR: 1.49 ratio       PTT - ( 24 Aug 2019 02:11 )  PTT:32.4 sec  CAPILLARY BLOOD GLUCOSE    LIVER FUNCTIONS - ( 24 Aug 2019 02:11 )  Alb: 3.8 g/dL / Pro: 7.5 g/dL / ALK PHOS: 115 U/L / ALT: 7 U/L / AST: 16 U/L / GGT: x           Urinalysis Basic - ( 24 Aug 2019 04:44 )    Color: Yellow / Appearance: Clear / S.005 / pH: x  Gluc: x / Ketone: Negative  / Bili: Negative / Urobili: Negative mg/dL   Blood: x / Protein: 30 mg/dL / Nitrite: Negative   Leuk Esterase: Negative / RBC: Negative /HPF / WBC 0-2   Sq Epi: x / Non Sq Epi: Negative / Bacteria: Negative      MEDICATIONS  (STANDING):  ALBUTerol/ipratropium for Nebulization 3 milliLiter(s) Nebulizer every 4 hours  clopidogrel Tablet 75 milliGRAM(s) Oral daily  diltiazem    Tablet 90 milliGRAM(s) Oral three times a day  enoxaparin Injectable 40 milliGRAM(s) SubCutaneous daily  ferrous sulfate Oral Tab/Cap - Peds 325 milliGRAM(s) Oral daily  furosemide    Tablet 40 milliGRAM(s) Oral daily  gabapentin 400 milliGRAM(s) Oral three times a day  methylPREDNISolone sodium succinate Injectable 40 milliGRAM(s) IV Push every 12 hours  pantoprazole   Suspension 40 milliGRAM(s) Oral daily  tamsulosin Oral Tab/Cap - Peds 0.4 milliGRAM(s) Oral at bedtime    RADIOLOGY & ADDITIONAL TESTS: personally visualized    PHYSICAL EXAM:    General: obese mle in no acute distress  Eyes: PERRLA, EOMI; conjunctiva and sclera clear  Head: Normocephalic; atraumatic  ENMT: No nasal discharge; airway clear  Neck: Supple; non tender; no masses  Respiratory: No wheezes, rales or rhonchi  Cardiovascular: irregular rate and rhythm. S1 and S2   Gastrointestinal: Soft non-tender non-distended; Normal bowel sounds  Genitourinary: No costovertebral angle tenderness  Extremities: No clubbing, cyanosis, trace RLE edema, left BKA  Vascular: Peripheral pulses palpable 2+ bilaterally  Neurological: Alert and oriented x4  Skin: Warm and dry.   Musculoskeletal: Normal tone, without deformities  Psychiatric: Cooperative and appropriate

## 2019-08-26 LAB
ANION GAP SERPL CALC-SCNC: 10 MMOL/L — SIGNIFICANT CHANGE UP (ref 5–17)
BUN SERPL-MCNC: 22 MG/DL — HIGH (ref 8–20)
CALCIUM SERPL-MCNC: 8.9 MG/DL — SIGNIFICANT CHANGE UP (ref 8.6–10.2)
CHLORIDE SERPL-SCNC: 99 MMOL/L — SIGNIFICANT CHANGE UP (ref 98–107)
CO2 SERPL-SCNC: 30 MMOL/L — HIGH (ref 22–29)
CREAT SERPL-MCNC: 0.76 MG/DL — SIGNIFICANT CHANGE UP (ref 0.5–1.3)
GLUCOSE BLDC GLUCOMTR-MCNC: 184 MG/DL — HIGH (ref 70–99)
GLUCOSE SERPL-MCNC: 159 MG/DL — HIGH (ref 70–115)
HCT VFR BLD CALC: 29.5 % — LOW (ref 39–50)
HGB BLD-MCNC: 8.4 G/DL — LOW (ref 13–17)
MCHC RBC-ENTMCNC: 23.6 PG — LOW (ref 27–34)
MCHC RBC-ENTMCNC: 28.5 GM/DL — LOW (ref 32–36)
MCV RBC AUTO: 82.9 FL — SIGNIFICANT CHANGE UP (ref 80–100)
PLATELET # BLD AUTO: 160 K/UL — SIGNIFICANT CHANGE UP (ref 150–400)
POTASSIUM SERPL-MCNC: 3.8 MMOL/L — SIGNIFICANT CHANGE UP (ref 3.5–5.3)
POTASSIUM SERPL-SCNC: 3.8 MMOL/L — SIGNIFICANT CHANGE UP (ref 3.5–5.3)
RBC # BLD: 3.56 M/UL — LOW (ref 4.2–5.8)
RBC # FLD: 16.9 % — HIGH (ref 10.3–14.5)
SODIUM SERPL-SCNC: 139 MMOL/L — SIGNIFICANT CHANGE UP (ref 135–145)
WBC # BLD: 9.99 K/UL — SIGNIFICANT CHANGE UP (ref 3.8–10.5)
WBC # FLD AUTO: 9.99 K/UL — SIGNIFICANT CHANGE UP (ref 3.8–10.5)

## 2019-08-26 PROCEDURE — 99232 SBSQ HOSP IP/OBS MODERATE 35: CPT

## 2019-08-26 PROCEDURE — 78452 HT MUSCLE IMAGE SPECT MULT: CPT | Mod: 26

## 2019-08-26 PROCEDURE — 93018 CV STRESS TEST I&R ONLY: CPT

## 2019-08-26 PROCEDURE — 93016 CV STRESS TEST SUPVJ ONLY: CPT

## 2019-08-26 PROCEDURE — 99233 SBSQ HOSP IP/OBS HIGH 50: CPT

## 2019-08-26 RX ORDER — ACETAMINOPHEN 500 MG
650 TABLET ORAL EVERY 6 HOURS
Refills: 0 | Status: DISCONTINUED | OUTPATIENT
Start: 2019-08-26 | End: 2019-09-03

## 2019-08-26 RX ORDER — ACETAMINOPHEN 500 MG
650 TABLET ORAL ONCE
Refills: 0 | Status: COMPLETED | OUTPATIENT
Start: 2019-08-26 | End: 2019-08-26

## 2019-08-26 RX ADMIN — Medication 90 MILLIGRAM(S): at 05:29

## 2019-08-26 RX ADMIN — Medication 3 MILLILITER(S): at 23:52

## 2019-08-26 RX ADMIN — Medication 3 MILLILITER(S): at 03:20

## 2019-08-26 RX ADMIN — Medication 650 MILLIGRAM(S): at 05:15

## 2019-08-26 RX ADMIN — Medication 3 MILLILITER(S): at 08:36

## 2019-08-26 RX ADMIN — Medication 3 MILLILITER(S): at 15:16

## 2019-08-26 RX ADMIN — Medication 40 MILLIGRAM(S): at 05:29

## 2019-08-26 RX ADMIN — ENOXAPARIN SODIUM 40 MILLIGRAM(S): 100 INJECTION SUBCUTANEOUS at 23:23

## 2019-08-26 RX ADMIN — Medication 3 MILLILITER(S): at 00:37

## 2019-08-26 RX ADMIN — Medication 3 MILLILITER(S): at 20:28

## 2019-08-26 RX ADMIN — Medication 90 MILLIGRAM(S): at 23:24

## 2019-08-26 RX ADMIN — Medication 90 MILLIGRAM(S): at 13:36

## 2019-08-26 RX ADMIN — CLOPIDOGREL BISULFATE 75 MILLIGRAM(S): 75 TABLET, FILM COATED ORAL at 13:35

## 2019-08-26 RX ADMIN — Medication 325 MILLIGRAM(S): at 13:35

## 2019-08-26 RX ADMIN — Medication 40 MILLIGRAM(S): at 17:38

## 2019-08-26 RX ADMIN — GABAPENTIN 400 MILLIGRAM(S): 400 CAPSULE ORAL at 23:24

## 2019-08-26 RX ADMIN — GABAPENTIN 400 MILLIGRAM(S): 400 CAPSULE ORAL at 13:34

## 2019-08-26 RX ADMIN — GABAPENTIN 400 MILLIGRAM(S): 400 CAPSULE ORAL at 05:29

## 2019-08-26 RX ADMIN — PANTOPRAZOLE SODIUM 40 MILLIGRAM(S): 20 TABLET, DELAYED RELEASE ORAL at 13:34

## 2019-08-26 RX ADMIN — TAMSULOSIN HYDROCHLORIDE 0.4 MILLIGRAM(S): 0.4 CAPSULE ORAL at 23:23

## 2019-08-26 RX ADMIN — Medication 650 MILLIGRAM(S): at 04:30

## 2019-08-26 NOTE — PROGRESS NOTE ADULT - SUBJECTIVE AND OBJECTIVE BOX
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 CT chest negative for PE and showed pulmonary fibrosis , pulmonary HTN.   patient had stress and tte today. patient seen at bedside and states feeling better.       REVIEW OF SYSTEMS:    CONSTITUTIONAL: No fever, weight loss, or fatigue  RESPIRATORY: No cough, wheezing, hemoptysis; No shortness of breath  CARDIOVASCULAR: No chest pain, palpitations  GASTROINTESTINAL: No abdominal or epigastric pain. No nausea, vomiting  NEUROLOGICAL: No headaches, memory loss, loss of strength.  MISCELLANEOUS:      Vital Signs Last 24 Hrs  T(C): 36.5 (26 Aug 2019 13:32), Max: 37 (25 Aug 2019 15:23)  T(F): 97.7 (26 Aug 2019 13:32), Max: 98.6 (25 Aug 2019 15:23)  HR: 79 (26 Aug 2019 13:32) (72 - 106)  BP: 103/62 (26 Aug 2019 13:32) (103/62 - 129/60)  BP(mean): --  RR: 18 (26 Aug 2019 13:32) (18 - 24)  SpO2: 97% (26 Aug 2019 08:38) (97% - 99%)    PHYSICAL EXAM:    GENERAL: NAD, well-groomed  HEENT: PERRL, +EOMI  NECK: soft, Supple, No JVD,   CHEST/LUNG: crackles  HEART: S1S2+, Regular rate and rhythm; No murmurs, rubs, or gallops  ABDOMEN: Soft, Nontender, Nondistended; Bowel sounds present  EXTREMITIES:  left bka  SKIN: No rashes or lesions  NEURO: AAOX3, no focal deficits,   PSYCH: normal mood      LABS:                        8.4    9.99  )-----------( 160      ( 26 Aug 2019 05:55 )             29.5     08-26    139  |  99  |  22.0<H>  ----------------------------<  159<H>  3.8   |  30.0<H>  |  0.76    Ca    8.9      26 Aug 2019 05:55        MEDICATIONS  (STANDING):  ALBUTerol/ipratropium for Nebulization 3 milliLiter(s) Nebulizer every 4 hours  clopidogrel Tablet 75 milliGRAM(s) Oral daily  diltiazem    Tablet 90 milliGRAM(s) Oral three times a day  enoxaparin Injectable 40 milliGRAM(s) SubCutaneous daily  ferrous sulfate Oral Tab/Cap - Peds 325 milliGRAM(s) Oral daily  furosemide    Tablet 40 milliGRAM(s) Oral daily  gabapentin 400 milliGRAM(s) Oral three times a day  methylPREDNISolone sodium succinate Injectable 40 milliGRAM(s) IV Push every 12 hours  pantoprazole   Suspension 40 milliGRAM(s) Oral daily  tamsulosin Oral Tab/Cap - Peds 0.4 milliGRAM(s) Oral at bedtime    MEDICATIONS  (PRN):  acetaminophen   Tablet .. 650 milliGRAM(s) Oral every 6 hours PRN Temp greater or equal to 38C (100.4F), Mild Pain (1 - 3)      RADIOLOGY & ADDITIONAL TESTS:

## 2019-08-26 NOTE — PROGRESS NOTE ADULT - SUBJECTIVE AND OBJECTIVE BOX
[place] [facility]                                                        Office: [address], [place] [address]                                                       Telephone: [phone]. Fax:[phone]                                                                             PROGRESS NOTE   Reason for follow up: Shortness of breath                            Overnight: No new events.   Update: Patient remains with SOB, desats when NC O2 is off to eat, dry cough, and diffuse wheeze, though he reports he has been improving.    Subjective: " I feel better"   Complains of:   Review of symptoms: Cardiac:  No chest pain. No dyspnea. No palpitations.  Respiratory: no cough. No dyspnea  Gastrointestinal: No diarrhea. No abdominal pain. No bleeding.     Past medical history: No updates.   Chronic conditions:  Hypertension: controlled. CHF: Stable. CAD: Stable ischemic heart disease. DM: Stable;    	  Vitals:  T(C): 36.7 (08-26-19 @ 15:06), Max: 37 (08-25-19 @ 15:23)  HR: 101 (08-26-19 @ 15:06) (72 - 106)  BP: 120/70 (08-26-19 @ 15:06) (103/62 - 129/60)  RR: 20 (08-26-19 @ 15:06) (18 - 24)  SpO2: 90% (08-26-19 @ 15:06) (90% - 99%)  Wt(kg): --  I&O's Summary    25 Aug 2019 07:01  -  26 Aug 2019 07:00  --------------------------------------------------------  IN: 0 mL / OUT: 300 mL / NET: -300 mL    26 Aug 2019 07:01  -  26 Aug 2019 15:12  --------------------------------------------------------  IN: 0 mL / OUT: 400 mL / NET: -400 mL      Weight (kg): 119.1 (08-26 @ 05:24)    PHYSICAL EXAM:  Appearance: Comfortable. No acute distress  HEENT:  Head and neck: Atraumatic. Normocephalic.  Normal oral mucosa, PERRL, Neck is supple. No JVD, No carotid bruit.   Neurologic: A & O x 3, no focal deficits. EOMI , Cranial nerves are intact.  Lymphatic: No cervical lymphadenopathy  Cardiovascular: Normal S1 S2, No murmur, rubs/gallops. No JVD, No edema  Respiratory: diffuse expiratory wheeze  Gastrointestinal:  Soft, Non-tender, + BS  Lower Extremities: RLE 1+ edema, L BKA  Psychiatry: Patient is calm. No agitation. Mood & affect appropriate  Skin: No rashes/ ecchymoses/cyanosis/ulcers visualized on the face, hands or feet.    CURRENT MEDICATIONS:  diltiazem    Tablet 90 milliGRAM(s) Oral three times a day  furosemide    Tablet 40 milliGRAM(s) Oral daily  tamsulosin Oral Tab/Cap - Peds 0.4 milliGRAM(s) Oral at bedtime    ALBUTerol/ipratropium for Nebulization  gabapentin  pantoprazole   Suspension  methylPREDNISolone sodium succinate Injectable  clopidogrel Tablet  enoxaparin Injectable  ferrous sulfate Oral Tab/Cap - Peds      LABS:	 	  CARDIAC MARKERS ( 25 Aug 2019 10:52 )  x     / x     / x     / x     / x      p-BNP 25 Aug 2019 10:52: 3649 pg/mL, CARDIAC MARKERS ( 24 Aug 2019 05:06 )  x     / <0.01 ng/mL / x     / x     / x      p-BNP 24 Aug 2019 05:06: x                                8.4    9.99  )-----------( 160      ( 26 Aug 2019 05:55 )             29.5     08-26    139  |  99  |  22.0<H>  ----------------------------<  159<H>  3.8   |  30.0<H>  |  0.76    Ca    8.9      26 Aug 2019 05:55      proBNP: Serum Pro-Brain Natriuretic Peptide: 3649 pg/mL (08-25 @ 10:52)    Lipid Profile:   HgA1c: TSH:     TELEMETRY: RAFib 100-110s  ECG:    < from: 12 Lead ECG (08.25.19 @ 11:44) >  Ventricular Rate 94 BPM    Atrial Rate 108 BPM    QRS Duration 96 ms    Q-T Interval 360 ms    QTC Calculation(Bezet) 450 ms    R Axis 73 degrees    T Axis 65 degrees    Diagnosis Line Atrial fibrillation  Anteroseptal infarct , age undetermined    Confirmed by PRANAY LOPES ([id number]) on 8/25/2019 3:02:55 PM    < end of copied text >  	    DIAGNOSTIC TESTING:  [x] Echocardiogram:   < from: TTE Echo w/Cont Complete (08.26.19 @ 09:09) >  Summary:   1. Left ventricular ejection fraction, by visual estimation, is 60 to   65%.   2. Normal global left ventricular systolic function.   3. The mitral in-flow pattern reveals no discernable A-wave, therefore   no comment on diastolic function can be made.   4. Mild mitral annular calcification.   5. Mild thickening of the anterior and posterior mitral valve leaflets.   6. The tricuspid valve was poorly seen. In the subcostal view it   apepared nl. No gross TR was noted.   7. Dilatation of the ascending aorta.   8. Estimated pulmonary artery systolic pressure is 57.8 mmHg assuming a   right atrial pressure of 15 mmHg, which is consistent with moderate   pulmonary hypertension.   9. Pulmonary hypertension is present.    MD Geri Electronically signed on 8/26/2019 at 10:56:00 AM       < end of copied text >    [x] Stress Test:    pending results    OTHER: 	  < from: Xray Chest 1 View-PORTABLE IMMEDIATE (08.24.19 @ 02:46) >  EXAM:  XR CHEST PORTABLE IMMED 1V                          PROCEDURE DATE:  08/24/2019          INTERPRETATION:  Portable chest radiograph        CLINICAL INFORMATION: Sepsis. Shortness of breath  Assess for pneumonia  TECHNIQUE:  Portable  AP view of the chest was obtained.    COMPARISON: 6/10/2019 chest radiograph available for review.    FINDINGS:    The lungs  are clear.  No pleural abnormality is seen.    The  heart is enlarged in transverse diameter. No hilar mass. Trachea   midline.   Visualized osseous structures are intact.        IMPRESSION:   No evidence of active chest disease.    < end of copied text >  < from: CT Chest No Cont (08.24.19 @ 06:33) >  IMPRESSION:   -Diffuse peripheral reticular opacities, which likely reflect pulmonary   interstitial fibrosis. Right pleural thickening and/or subsegmental   atelectasis, difficult to distinguish due to motion. No lobar   consolidation.    -Enlarged main pulmonary artery measuring 5.2 cm, indicative of pulmonary   hypertension.    -Nonspecific mediastinal lymphadenopathy measuring up to 1.6 cm.    -1.4 cm hypodense right thyroid lesion, for which nonemergent thyroid   ultrasound recommended for further characterization.    -Nodular hepatic contour, suspicious for cirrhosis.      < end of copied text >

## 2019-08-26 NOTE — PROGRESS NOTE ADULT - ASSESSMENT
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 Acute hypoxic respiratory failure likely due to combination of ADHF HFpEF + IPF - stopped IV, given lasix, on IV steroids, will need pulmonary eval in am, no evidence of PNA on CT chest, sepsis ruled out  --> observe off abx  --> taper abx    2. Afib with RVR - rate controlled, s/p Watchman off AC    3. Anemia due to chronic Dx with Hx of LGIB and cirrhosis  -> check iron panel    4. Hx of ETOH abuse with cirrhosis  -> stable    5. Right thyroid 1.4 cm lesion - outpt US

## 2019-08-27 ENCOUNTER — TRANSCRIPTION ENCOUNTER (OUTPATIENT)
Age: 64
End: 2019-08-27

## 2019-08-27 DIAGNOSIS — J44.1 CHRONIC OBSTRUCTIVE PULMONARY DISEASE WITH (ACUTE) EXACERBATION: ICD-10-CM

## 2019-08-27 LAB
ALBUMIN SERPL ELPH-MCNC: 3.3 G/DL — SIGNIFICANT CHANGE UP (ref 3.3–5.2)
ALP SERPL-CCNC: 95 U/L — SIGNIFICANT CHANGE UP (ref 40–120)
ALT FLD-CCNC: 28 U/L — SIGNIFICANT CHANGE UP
ANION GAP SERPL CALC-SCNC: 10 MMOL/L — SIGNIFICANT CHANGE UP (ref 5–17)
AST SERPL-CCNC: 29 U/L — SIGNIFICANT CHANGE UP
BILIRUB SERPL-MCNC: 0.3 MG/DL — LOW (ref 0.4–2)
BUN SERPL-MCNC: 19 MG/DL — SIGNIFICANT CHANGE UP (ref 8–20)
CALCIUM SERPL-MCNC: 9 MG/DL — SIGNIFICANT CHANGE UP (ref 8.6–10.2)
CHLORIDE SERPL-SCNC: 96 MMOL/L — LOW (ref 98–107)
CHOLEST SERPL-MCNC: 149 MG/DL — SIGNIFICANT CHANGE UP (ref 110–199)
CO2 SERPL-SCNC: 32 MMOL/L — HIGH (ref 22–29)
CREAT SERPL-MCNC: 0.73 MG/DL — SIGNIFICANT CHANGE UP (ref 0.5–1.3)
CULTURE RESULTS: SIGNIFICANT CHANGE UP
FERRITIN SERPL-MCNC: 45 NG/ML — SIGNIFICANT CHANGE UP (ref 30–400)
GLUCOSE SERPL-MCNC: 175 MG/DL — HIGH (ref 70–115)
HBA1C BLD-MCNC: 5.4 % — SIGNIFICANT CHANGE UP (ref 4–5.6)
HCT VFR BLD CALC: 31.5 % — LOW (ref 39–50)
HDLC SERPL-MCNC: 50 MG/DL — SIGNIFICANT CHANGE UP
HGB BLD-MCNC: 9.2 G/DL — LOW (ref 13–17)
IRON SATN MFR SERPL: 18 UG/DL — LOW (ref 59–158)
IRON SATN MFR SERPL: 4 % — LOW (ref 16–55)
LEGIONELLA AG UR QL: NEGATIVE — SIGNIFICANT CHANGE UP
LIPID PNL WITH DIRECT LDL SERPL: 89 MG/DL — SIGNIFICANT CHANGE UP
MAGNESIUM SERPL-MCNC: 1.5 MG/DL — LOW (ref 1.6–2.6)
MCHC RBC-ENTMCNC: 24 PG — LOW (ref 27–34)
MCHC RBC-ENTMCNC: 29.2 GM/DL — LOW (ref 32–36)
MCV RBC AUTO: 82.2 FL — SIGNIFICANT CHANGE UP (ref 80–100)
PLATELET # BLD AUTO: 173 K/UL — SIGNIFICANT CHANGE UP (ref 150–400)
POTASSIUM SERPL-MCNC: 3.6 MMOL/L — SIGNIFICANT CHANGE UP (ref 3.5–5.3)
POTASSIUM SERPL-SCNC: 3.6 MMOL/L — SIGNIFICANT CHANGE UP (ref 3.5–5.3)
PROT SERPL-MCNC: 6.4 G/DL — LOW (ref 6.6–8.7)
RBC # BLD: 3.83 M/UL — LOW (ref 4.2–5.8)
RBC # FLD: 17.4 % — HIGH (ref 10.3–14.5)
SODIUM SERPL-SCNC: 138 MMOL/L — SIGNIFICANT CHANGE UP (ref 135–145)
SPECIMEN SOURCE: SIGNIFICANT CHANGE UP
TIBC SERPL-MCNC: 403 UG/DL — SIGNIFICANT CHANGE UP (ref 220–430)
TOTAL CHOLESTEROL/HDL RATIO MEASUREMENT: 3 RATIO — LOW (ref 3.4–9.6)
TRANSFERRIN SERPL-MCNC: 282 MG/DL — SIGNIFICANT CHANGE UP (ref 180–329)
TRIGL SERPL-MCNC: 51 MG/DL — SIGNIFICANT CHANGE UP (ref 10–200)
TSH SERPL-MCNC: 0.28 UIU/ML — SIGNIFICANT CHANGE UP (ref 0.27–4.2)
WBC # BLD: 8.87 K/UL — SIGNIFICANT CHANGE UP (ref 3.8–10.5)
WBC # FLD AUTO: 8.87 K/UL — SIGNIFICANT CHANGE UP (ref 3.8–10.5)

## 2019-08-27 PROCEDURE — 99233 SBSQ HOSP IP/OBS HIGH 50: CPT

## 2019-08-27 PROCEDURE — 99232 SBSQ HOSP IP/OBS MODERATE 35: CPT

## 2019-08-27 RX ORDER — FUROSEMIDE 40 MG
40 TABLET ORAL ONCE
Refills: 0 | Status: COMPLETED | OUTPATIENT
Start: 2019-08-27 | End: 2019-08-27

## 2019-08-27 RX ORDER — ALPRAZOLAM 0.25 MG
1 TABLET ORAL
Qty: 10 | Refills: 0
Start: 2019-08-27 | End: 2019-08-31

## 2019-08-27 RX ORDER — FERROUS SULFATE 325(65) MG
1 TABLET ORAL
Qty: 30 | Refills: 0
Start: 2019-08-27 | End: 2019-09-25

## 2019-08-27 RX ORDER — ALPRAZOLAM 0.25 MG
0.5 TABLET ORAL EVERY 12 HOURS
Refills: 0 | Status: DISCONTINUED | OUTPATIENT
Start: 2019-08-27 | End: 2019-09-01

## 2019-08-27 RX ORDER — POTASSIUM CHLORIDE 20 MEQ
40 PACKET (EA) ORAL ONCE
Refills: 0 | Status: COMPLETED | OUTPATIENT
Start: 2019-08-27 | End: 2019-08-27

## 2019-08-27 RX ORDER — IRON SUCROSE 20 MG/ML
200 INJECTION, SOLUTION INTRAVENOUS EVERY 24 HOURS
Refills: 0 | Status: DISCONTINUED | OUTPATIENT
Start: 2019-08-27 | End: 2019-08-27

## 2019-08-27 RX ORDER — ASPIRIN/CALCIUM CARB/MAGNESIUM 324 MG
1 TABLET ORAL
Qty: 0 | Refills: 0 | DISCHARGE

## 2019-08-27 RX ORDER — CLOPIDOGREL BISULFATE 75 MG/1
1 TABLET, FILM COATED ORAL
Qty: 30 | Refills: 0
Start: 2019-08-27 | End: 2019-09-25

## 2019-08-27 RX ORDER — MAGNESIUM SULFATE 500 MG/ML
2 VIAL (ML) INJECTION ONCE
Refills: 0 | Status: COMPLETED | OUTPATIENT
Start: 2019-08-27 | End: 2019-08-27

## 2019-08-27 RX ORDER — ASPIRIN/CALCIUM CARB/MAGNESIUM 324 MG
1 TABLET ORAL
Qty: 30 | Refills: 0
Start: 2019-08-27 | End: 2019-09-25

## 2019-08-27 RX ORDER — FERROUS SULFATE 325(65) MG
325 TABLET ORAL
Refills: 0 | Status: DISCONTINUED | OUTPATIENT
Start: 2019-08-27 | End: 2019-09-03

## 2019-08-27 RX ADMIN — Medication 650 MILLIGRAM(S): at 07:57

## 2019-08-27 RX ADMIN — Medication 40 MILLIGRAM(S): at 17:08

## 2019-08-27 RX ADMIN — Medication 40 MILLIGRAM(S): at 17:07

## 2019-08-27 RX ADMIN — GABAPENTIN 400 MILLIGRAM(S): 400 CAPSULE ORAL at 22:25

## 2019-08-27 RX ADMIN — IRON SUCROSE 110 MILLIGRAM(S): 20 INJECTION, SOLUTION INTRAVENOUS at 13:11

## 2019-08-27 RX ADMIN — Medication 3 MILLILITER(S): at 20:43

## 2019-08-27 RX ADMIN — Medication 40 MILLIGRAM(S): at 06:41

## 2019-08-27 RX ADMIN — ENOXAPARIN SODIUM 40 MILLIGRAM(S): 100 INJECTION SUBCUTANEOUS at 22:25

## 2019-08-27 RX ADMIN — Medication 90 MILLIGRAM(S): at 22:25

## 2019-08-27 RX ADMIN — Medication 3 MILLILITER(S): at 03:54

## 2019-08-27 RX ADMIN — Medication 3 MILLILITER(S): at 11:45

## 2019-08-27 RX ADMIN — Medication 90 MILLIGRAM(S): at 06:41

## 2019-08-27 RX ADMIN — GABAPENTIN 400 MILLIGRAM(S): 400 CAPSULE ORAL at 13:12

## 2019-08-27 RX ADMIN — TAMSULOSIN HYDROCHLORIDE 0.4 MILLIGRAM(S): 0.4 CAPSULE ORAL at 22:25

## 2019-08-27 RX ADMIN — Medication 50 GRAM(S): at 09:23

## 2019-08-27 RX ADMIN — PANTOPRAZOLE SODIUM 40 MILLIGRAM(S): 20 TABLET, DELAYED RELEASE ORAL at 09:17

## 2019-08-27 RX ADMIN — Medication 40 MILLIEQUIVALENT(S): at 17:43

## 2019-08-27 RX ADMIN — Medication 325 MILLIGRAM(S): at 09:17

## 2019-08-27 RX ADMIN — Medication 3 MILLILITER(S): at 16:41

## 2019-08-27 RX ADMIN — CLOPIDOGREL BISULFATE 75 MILLIGRAM(S): 75 TABLET, FILM COATED ORAL at 09:17

## 2019-08-27 RX ADMIN — Medication 650 MILLIGRAM(S): at 07:00

## 2019-08-27 RX ADMIN — Medication 325 MILLIGRAM(S): at 17:08

## 2019-08-27 RX ADMIN — Medication 90 MILLIGRAM(S): at 13:12

## 2019-08-27 RX ADMIN — GABAPENTIN 400 MILLIGRAM(S): 400 CAPSULE ORAL at 06:41

## 2019-08-27 RX ADMIN — Medication 3 MILLILITER(S): at 08:19

## 2019-08-27 RX ADMIN — Medication 40 MILLIGRAM(S): at 06:40

## 2019-08-27 NOTE — DISCHARGE NOTE PROVIDER - NSDCCPCAREPLAN_GEN_ALL_CORE_FT
PRINCIPAL DISCHARGE DIAGNOSIS  Diagnosis: Atrial fibrillation with RVR  Assessment and Plan of Treatment:       SECONDARY DISCHARGE DIAGNOSES  Diagnosis: COPD exacerbation  Assessment and Plan of Treatment: PRINCIPAL DISCHARGE DIAGNOSIS  Diagnosis: COPD exacerbation  Assessment and Plan of Treatment:       SECONDARY DISCHARGE DIAGNOSES  Diagnosis: Smoker  Assessment and Plan of Treatment:     Diagnosis: Chronic diastolic congestive heart failure  Assessment and Plan of Treatment: Chronic diastolic congestive heart failure    Diagnosis: COPD exacerbation  Assessment and Plan of Treatment:

## 2019-08-27 NOTE — DISCHARGE NOTE PROVIDER - HOSPITAL COURSE
64 year old male with PMH of afib s/p watchman device , history of alcoholism in past , now sober , h/o tobacco abuse stopped many years ago , COPD , hx of BKA due to injury , diastolic CHF presented to hospital with c/o SOB that started suddenly progressively got worse , he felt chills , night sweats , no fever at home but was febrile in ED , code sepsis called , cultures sent , started on antibiotics .    at the time of my evaluation patient was resting in bed , speaking in full sentences , denies sick contacts , no palpitations . no significant cough , no phlegm production .    no nausea vomiting , no diarrhea , constipation.     CT chest negative for PE . showed pulmonary fibrosis , pulmonary HTN         patient admitted to medicine and seen by cardio. patient had stress test on 8/26 and tte. patient if cleared by cardio is ready for dc home.         time spent on dc 32 minutes 64 year old male with PMH of afib s/p watchman device , history of alcoholism in past , now sober , h/o tobacco abuse stopped many years ago , COPD , hx of BKA due to injury , diastolic CHF presented to hospital with c/o SOB that started suddenly progressively got worse , he felt chills , night sweats , no fever at home but was febrile in ED , code sepsis called , cultures sent , started on antibiotics .    at the time of my evaluation patient was resting in bed , speaking in full sentences , denies sick contacts , no palpitations . no significant cough , no phlegm production .    no nausea vomiting , no diarrhea , constipation.     CT chest negative for PE . showed pulmonary fibrosis , pulmonary HTN         patient admitted to medicine and seen by cardio. patient had stress test on 8/26 which was negative. tte showed diastolic heart failure. patient is now stable for dc home    patient advised dietary compliance        time spent on dc 32 minutes 64 year old male with PMH of afib s/p watchman device , history of alcoholism in past , now sober , h/o tobacco abuse stopped many years ago , COPD , hx of BKA due to injury , diastolic CHF presented to hospital with c/o SOB that started suddenly progressively got worse , he felt chills , night sweats , no fever at home but was febrile in ED , code sepsis called , cultures sent , started on antibiotics .    at the time of my evaluation patient was resting in bed , speaking in full sentences , denies sick contacts , no palpitations . no significant cough , no phlegm production .    no nausea vomiting , no diarrhea , constipation.     CT chest negative for PE . showed pulmonary fibrosis , pulmonary HTN         patient admitted to medicine and seen by cardio. patient had stress test on 8/26 which was negative. tte showed diastolic heart failure. 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  Admitted for hypoxic respiratory failure due to CHF / COPD / Pulmonary Fibrosis.  He also recalls h/o VIJAY, but not on CPAP due to claustrophobia.          Interval History:    Continued swelling at L Stump.  No erythema / pain.  Denies SOB.  SaO2 93% on RA, noted 84% while sleeping per RN.  On further questioning, his daughter Sarita states pt remains an active smoker.          1. Acute hypoxic respiratory failure likely due to combination of ADHF HFpEF + pulmonary fibrosis.  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  No evidence of PNA on CT chest, sepsis ruled out.  Taper IV Steroids.  Will likely need outpatient PFTs, Sleep study.  Pulmonary evaluation requested.         1b. VIJAY - discussed reevaluation as outpatient and refitting for CPAP device.  Pt acknowledged understanding.      2. Afib with RVR - rate controlled, s/p Watchman off AC.  Outpatient cardiology followup discussed.     3. Anemia - Monitor CBC.  On Fe supplementation. CBC stable.     4. Hx of ETOH abuse with cirrhosis - -> stable    5. Right thyroid 1.4 cm lesion, mediastinal lymphadenopathy - Discussed possibilities including malignancy.  outpt workup including U/S.  He agreed and acknowledged understanding.    6. H/o BKA - PT followup.  Ordered ACE wrap to assist with fitment of stump into prosthetic.     7. Smoker - Nicotine Patch.  I strongly encouraged the patient to quit smoking.  I outlined the extensive negative impact of smoking on quality of life and the numerous consequences of continued smoking including lung disease, heart attack, stroke, vascular disease, malignancy, increased mortality, etc.  The patient understood these effects and agreed on smoking cessation.     > DVT Prophylaxis - Lovenox subcut        Pt refusing TANVIR placement.    Stable for d/c home.     Discharge d/w daughter with pt's permission [phone]    Disposition: Stable for discharge.  Outpatient followup discussed.    Total time spent on discharge is  55 minutes.

## 2019-08-27 NOTE — DISCHARGE NOTE PROVIDER - NSDCFUSCHEDAPPT_GEN_ALL_CORE_FT
STONE DIAZ ; 11/20/2019 ; NPP Cardio 9 [facility]  STONE DIAZ ; 11/20/2019 ; NPP Cardio 9 [facility]  STONE DIAZ ; 11/20/2019 ; NPP Cardio 9 [facility]  STONE DIAZ ; 11/20/2019 ; NPP Cardio 9 [facility]  STONE DIAZ ; 11/20/2019 ; NPP Cardio 9 [facility]  STONE DIAZ ; 11/20/2019 ; NPP Cardio 9 [facility]  STONE DIAZ ; 11/20/2019 ; NPP Cardio 9 [facility]

## 2019-08-27 NOTE — PROGRESS NOTE ADULT - SUBJECTIVE AND OBJECTIVE BOX
STONE DIAZ    [id number]    64y      Male    INTERVAL HPI/OVERNIGHT EVENTS:    64 year old male with PMH of afib s/p watchman device , history of alcoholism in past , now sober , h/o tobacco abuse stopped many years ago , COPD , hx of Left BKA due to injury , diastolic CHF presented to hospital with c/o SOB. CT chest negative for PE and showed pulmonary fibrosis , pulmonary HTN.   patient had a stress test which was negative but continues to complain of sob.     REVIEW OF SYSTEMS:    CONSTITUTIONAL: No fever, weight loss, or fatigue  RESPIRATORY: sob  CARDIOVASCULAR: No chest pain, palpitations  GASTROINTESTINAL: No abdominal or epigastric pain. No nausea, vomiting  NEUROLOGICAL: No headaches, memory loss, loss of strength.  MISCELLANEOUS:      Vital Signs Last 24 Hrs  T(C): 36.7 (28 Aug 2019 05:44), Max: 37 (27 Aug 2019 10:44)  T(F): 98 (28 Aug 2019 05:44), Max: 98.6 (27 Aug 2019 10:44)  HR: 95 (28 Aug 2019 05:44) (95 - 109)  BP: 137/69 (28 Aug 2019 05:44) (125/70 - 154/73)  BP(mean): --  RR: 18 (28 Aug 2019 05:44) (18 - 24)  SpO2: 95% (28 Aug 2019 05:44) (86% - 98%)    PHYSICAL EXAM:    GENERAL: NAD, well-groomed  HEENT: PERRL, +EOMI  NECK: soft, Supple, No JVD,   CHEST/LUNG: clear  HEART: S1S2+, Regular rate and rhythm; No murmurs, rubs, or gallops  ABDOMEN: Soft, Nontender, Nondistended; Bowel sounds present  EXTREMITIES:  left bka  SKIN: No rashes or lesions  NEURO: AAOX3, no focal deficits,   PSYCH: normal mood      LABS:                        9.2    8.87  )-----------( 173      ( 27 Aug 2019 06:06 )             31.5     08-27    138  |  96<L>  |  19.0  ----------------------------<  175<H>  3.6   |  32.0<H>  |  0.73    Ca    9.0      27 Aug 2019 06:06  Mg     1.5     08-27    TPro  6.4<L>  /  Alb  3.3  /  TBili  0.3<L>  /  DBili  x   /  AST  29  /  ALT  28  /  AlkPhos  95  08-27            MEDICATIONS  (STANDING):  ALBUTerol/ipratropium for Nebulization 3 milliLiter(s) Nebulizer every 4 hours  clopidogrel Tablet 75 milliGRAM(s) Oral daily  diltiazem    Tablet 90 milliGRAM(s) Oral three times a day  enoxaparin Injectable 40 milliGRAM(s) SubCutaneous daily  ferrous    sulfate 325 milliGRAM(s) Oral two times a day  ferrous sulfate Oral Tab/Cap - Peds 325 milliGRAM(s) Oral daily  furosemide    Tablet 40 milliGRAM(s) Oral daily  gabapentin 400 milliGRAM(s) Oral three times a day  methylPREDNISolone sodium succinate Injectable 40 milliGRAM(s) IV Push every 12 hours  pantoprazole   Suspension 40 milliGRAM(s) Oral daily  tamsulosin Oral Tab/Cap - Peds 0.4 milliGRAM(s) Oral at bedtime    MEDICATIONS  (PRN):  acetaminophen   Tablet .. 650 milliGRAM(s) Oral every 6 hours PRN Temp greater or equal to 38C (100.4F), Mild Pain (1 - 3)  ALPRAZolam 0.5 milliGRAM(s) Oral every 12 hours PRN anxiety      RADIOLOGY & ADDITIONAL TESTS:

## 2019-08-27 NOTE — PROGRESS NOTE ADULT - SUBJECTIVE AND OBJECTIVE BOX
[facility]  [facility]ULITY PRACT[facility]  [address] [id number]    REASON FOR VISIT:    UPDATE:  TELEMETRY MONITORING:    ROS:  No fever chills  Cardiac  No cp sob or palp  Resp  no cough no mucus production  Gi  no abd pain no melana  Ext No calf tenderness, no edema    08-27    138  |  96<L>  |  19.0  ----------------------------<  175<H>  3.6   |  32.0<H>  |  0.73    Ca    9.0      27 Aug 2019 06:06  Mg     1.5     08-27    TPro  6.4<L>  /  Alb  3.3  /  TBili  0.3<L>  /  DBili  x   /  AST  29  /  ALT  28  /  AlkPhos  95  08-27    LIVER FUNCTIONS - ( 27 Aug 2019 06:06 )  Alb: 3.3 g/dL / Pro: 6.4 g/dL / ALK PHOS: 95 U/L / ALT: 28 U/L / AST: 29 U/L / GGT: x               08-26-19 @ 07:01 - 08-27-19 @ 07:00  --------------------------------------------------------  IN: 300 mL / OUT: 1000 mL / NET: -700 mL    08-27-19 @ 07:01 - 08-27-19 @ 16:39  --------------------------------------------------------  IN: 770 mL / OUT: 1000 mL / NET: -230 mL    MEDICATIONS  (STANDING):  ALBUTerol/ipratropium for Nebulization 3 milliLiter(s) Nebulizer every 4 hours  clopidogrel Tablet 75 milliGRAM(s) Oral daily  diltiazem    Tablet 90 milliGRAM(s) Oral three times a day  enoxaparin Injectable 40 milliGRAM(s) SubCutaneous daily  ferrous    sulfate 325 milliGRAM(s) Oral two times a day  ferrous sulfate Oral Tab/Cap - Peds 325 milliGRAM(s) Oral daily  furosemide    Tablet 40 milliGRAM(s) Oral daily  gabapentin 400 milliGRAM(s) Oral three times a day  methylPREDNISolone sodium succinate Injectable 40 milliGRAM(s) IV Push every 12 hours  pantoprazole   Suspension 40 milliGRAM(s) Oral daily  tamsulosin Oral Tab/Cap - Peds 0.4 milliGRAM(s) Oral at bedtime  Vital Signs Last 24 Hrs  T(C): 36.8 (27 Aug 2019 15:20), Max: 37 (27 Aug 2019 10:44)  T(F): 98.2 (27 Aug 2019 15:20), Max: 98.6 (27 Aug 2019 10:44)  HR: 109 (27 Aug 2019 15:20) (94 - 111)  BP: 125/70 (27 Aug 2019 15:20) (114/56 - 154/73)  BP(mean): --  RR: 19 (27 Aug 2019 15:20) (18 - 24)  SpO2: 95% (27 Aug 2019 15:20) (86% - 97%)  T(C): 36.8 (08-27-19 @ 15:20), Max: 37 (08-27-19 @ 10:44)  HR: 109 (08-27-19 @ 15:20) (94 - 111)  BP: 125/70 (08-27-19 @ 15:20) (114/56 - 154/73)  RR: 19 (08-27-19 @ 15:20) (18 - 24)  SpO2: 95% (08-27-19 @ 15:20) (86% - 97%)    HEENT Head atraumatic eomi, oral mucosa moist  CV S1&S2      No r/g/ m   RESP    GI  Soft active bowel sounds non tender  EXT  No clubbing/Cyanosis /Edema  NEURO  Alert oriented  No gross motor or sensory deficits    < from: TTE Echo w/Cont Complete (08.26.19 @ 09:09) >   1. Left ventricular ejection fraction, by visual estimation, is 60 to   65%.   2. Normal global left ventricular systolic function.   3. The mitral in-flow pattern reveals no discernable A-wave, therefore   no comment on diastolic function can be made.   4. Mild mitral annular calcification.   5. Mild thickening of the anterior and posterior mitral valve leaflets.   6. The tricuspid valve was poorly seen. In the subcostal view it   apepared nl. No gross TR was noted.   7. Dilatation of the ascending aorta.   8. Estimated pulmonary artery systolic pressure is 57.8 mmHg assuming a   right atrial pressure of 15 mmHg, which is consistent with moderate   pulmonary hypertension.   9. Pulmonary hypertension is present.      < from: TTE Echo w/Cont Complete (08.26.19 @ 09:09) >   1. Left ventricular ejection fraction, by visual estimation, is 60 to   65%.   2. Normal global left ventricular systolic function.   3. The mitral in-flow pattern reveals no discernable A-wave, therefore   no comment on diastolic function can be made.   4. Mild mitral annular calcification.   5. Mild thickening of the anterior and posterior mitral valve leaflets.   6. The tricuspid valve was poorly seen. In the subcostal view it   apepared nl. No gross TR was noted.   7. Dilatation of the ascending aorta.   8. Estimated pulmonary artery systolic pressure is 57.8 mmHg assuming a   right atrial pressure of 15 mmHg, which is consistent with moderate   pulmonary hypertension.   9. Pulmonary hypertension is present. [facility]  [facility]ACT[facility]  [address], [place] [address]    REASON FOR VISIT:  Folllow up on chf  UPDATE:  Sob and wheezing this afternoon  TELEMETRY MONITORING:  Atrial fib with controlled ventricular response    ROS:  No fever chills  Cardiac  No cp sob or palp  Resp  no cough no mucus production  ++ wheeze  Gi  no abd pain no melana  08-27    138  |  96<L>  |  19.0  ----------------------------<  175<H>  3.6   |  32.0<H>  |  0.73    Ca    9.0      27 Aug 2019 06:06  Mg     1.5     08-27    TPro  6.4<L>  /  Alb  3.3  /  TBili  0.3<L>  /  DBili  x   /  AST  29  /  ALT  28  /  AlkPhos  95  08-27    LIVER FUNCTIONS - ( 27 Aug 2019 06:06 )  Alb: 3.3 g/dL / Pro: 6.4 g/dL / ALK PHOS: 95 U/L / ALT: 28 U/L / AST: 29 U/L / GGT: x           08-26-19 @ 07:01  -  08-27-19 @ 07:00  --------------------------------------------------------  IN: 300 mL / OUT: 1000 mL / NET: -700 mL    08-27-19 @ 07:01 - 08-27-19 @ 16:39  --------------------------------------------------------  IN: 770 mL / OUT: 1000 mL / NET: -230 mL    MEDICATIONS  (STANDING):  ALBUTerol/ipratropium for Nebulization 3 milliLiter(s) Nebulizer every 4 hours  clopidogrel Tablet 75 milliGRAM(s) Oral daily  diltiazem    Tablet 90 milliGRAM(s) Oral three times a day  enoxaparin Injectable 40 milliGRAM(s) SubCutaneous daily  ferrous    sulfate 325 milliGRAM(s) Oral two times a day  ferrous sulfate Oral Tab/Cap - Peds 325 milliGRAM(s) Oral daily  furosemide    Tablet 40 milliGRAM(s) Oral daily  gabapentin 400 milliGRAM(s) Oral three times a day  methylPREDNISolone sodium succinate Injectable 40 milliGRAM(s) IV Push every 12 hours  pantoprazole   Suspension 40 milliGRAM(s) Oral daily  tamsulosin Oral Tab/Cap - Peds 0.4 milliGRAM(s) Oral at bedtime      Vital Signs Last 24 Hrs  T(C): 36.8 (27 Aug 2019 15:20), Max: 37 (27 Aug 2019 10:44)  T(F): 98.2 (27 Aug 2019 15:20), Max: 98.6 (27 Aug 2019 10:44)  HR: 109 (27 Aug 2019 15:20) (94 - 111)  BP: 125/70 (27 Aug 2019 15:20) (114/56 - 154/73)  BP(mean): --  RR: 19 (27 Aug 2019 15:20) (18 - 24)  SpO2: 95% (27 Aug 2019 15:20) (86% - 97%)  T(C): 36.8 (08-27-19 @ 15:20), Max: 37 (08-27-19 @ 10:44)  HR: 109 (08-27-19 @ 15:20) (94 - 111)  BP: 125/70 (08-27-19 @ 15:20) (114/56 - 154/73)  RR: 19 (08-27-19 @ 15:20) (18 - 24)  SpO2: 95% (08-27-19 @ 15:20) (86% - 97%)    HEENT Head atraumatic eomi, oral mucosa moist  CV S1& s2 irregular  RESP  Expiratory wheeze  GI  Soft active bowel sounds non tender  EXT  No clubbing/Cyanosis /Edema  NEURO  Alert oriented  s/p right bka  left one plus edema    < from: TTE Echo w/Cont Complete (08.26.19 @ 09:09) >   1. Left ventricular ejection fraction, by visual estimation, is 60 to   65%.   2. Normal global left ventricular systolic function.   3. The mitral in-flow pattern reveals no discernable A-wave, therefore   no comment on diastolic function can be made.   4. Mild mitral annular calcification.   5. Mild thickening of the anterior and posterior mitral valve leaflets.   6. The tricuspid valve was poorly seen. In the subcostal view it   appeared nl. No gross TR was noted.   7. Dilatation of the ascending aorta.   8. Estimated pulmonary artery systolic pressure is 57.8 mmHg assuming a   right atrial pressure of 15 mmHg, which is consistent with moderate   pulmonary hypertension.   9. Pulmonary hypertension is present.      Nst negative for ischemia [facility]  [facility] PRACT[facility]  [address], [place] [address]    REASON FOR VISIT:  Folllow up on chf  UPDATE:  Sob and wheezing this afternoon  TELEMETRY MONITORING:  Atrial fib with controlled ventricular response    ROS:  No fever chills  Cardiac  No cp or palp  + sob  Resp  no cough no mucus production  ++ wheeze  Gi  no abd pain no melana  08-27    138  |  96<L>  |  19.0  ----------------------------<  175<H>  3.6   |  32.0<H>  |  0.73    Ca    9.0      27 Aug 2019 06:06  Mg     1.5     08-27    TPro  6.4<L>  /  Alb  3.3  /  TBili  0.3<L>  /  DBili  x   /  AST  29  /  ALT  28  /  AlkPhos  95  08-27    LIVER FUNCTIONS - ( 27 Aug 2019 06:06 )  Alb: 3.3 g/dL / Pro: 6.4 g/dL / ALK PHOS: 95 U/L / ALT: 28 U/L / AST: 29 U/L / GGT: x           08-26-19 @ 07:01 - 08-27-19 @ 07:00  --------------------------------------------------------  IN: 300 mL / OUT: 1000 mL / NET: -700 mL    08-27-19 @ 07:01 - 08-27-19 @ 16:39  --------------------------------------------------------  IN: 770 mL / OUT: 1000 mL / NET: -230 mL    MEDICATIONS  (STANDING):  ALBUTerol/ipratropium for Nebulization 3 milliLiter(s) Nebulizer every 4 hours  clopidogrel Tablet 75 milliGRAM(s) Oral daily  diltiazem    Tablet 90 milliGRAM(s) Oral three times a day  enoxaparin Injectable 40 milliGRAM(s) SubCutaneous daily  ferrous    sulfate 325 milliGRAM(s) Oral two times a day  ferrous sulfate Oral Tab/Cap - Peds 325 milliGRAM(s) Oral daily  furosemide    Tablet 40 milliGRAM(s) Oral daily  gabapentin 400 milliGRAM(s) Oral three times a day  methylPREDNISolone sodium succinate Injectable 40 milliGRAM(s) IV Push every 12 hours  pantoprazole   Suspension 40 milliGRAM(s) Oral daily  tamsulosin Oral Tab/Cap - Peds 0.4 milliGRAM(s) Oral at bedtime      Vital Signs Last 24 Hrs  T(C): 36.8 (27 Aug 2019 15:20), Max: 37 (27 Aug 2019 10:44)  T(F): 98.2 (27 Aug 2019 15:20), Max: 98.6 (27 Aug 2019 10:44)  HR: 109 (27 Aug 2019 15:20) (94 - 111)  BP: 125/70 (27 Aug 2019 15:20) (114/56 - 154/73)  BP(mean): --  RR: 19 (27 Aug 2019 15:20) (18 - 24)  SpO2: 95% (27 Aug 2019 15:20) (86% - 97%)  T(C): 36.8 (08-27-19 @ 15:20), Max: 37 (08-27-19 @ 10:44)  HR: 109 (08-27-19 @ 15:20) (94 - 111)  BP: 125/70 (08-27-19 @ 15:20) (114/56 - 154/73)  RR: 19 (08-27-19 @ 15:20) (18 - 24)  SpO2: 95% (08-27-19 @ 15:20) (86% - 97%)    HEENT Head atraumatic eomi, oral mucosa moist  CV S1& s2 irregular  RESP  Expiratory wheeze  GI  Soft active bowel sounds non tender  EXT  No clubbing/Cyanosis /Edema  NEURO  Alert oriented  s/p right bka  left one plus edema    < from: TTE Echo w/Cont Complete (08.26.19 @ 09:09) >   1. Left ventricular ejection fraction, by visual estimation, is 60 to   65%.   2. Normal global left ventricular systolic function.   3. The mitral in-flow pattern reveals no discernable A-wave, therefore   no comment on diastolic function can be made.   4. Mild mitral annular calcification.   5. Mild thickening of the anterior and posterior mitral valve leaflets.   6. The tricuspid valve was poorly seen. In the subcostal view it   appeared nl. No gross TR was noted.   7. Dilatation of the ascending aorta.   8. Estimated pulmonary artery systolic pressure is 57.8 mmHg assuming a   right atrial pressure of 15 mmHg, which is consistent with moderate   pulmonary hypertension.   9. Pulmonary hypertension is present.      Nst negative for ischemia

## 2019-08-27 NOTE — DISCHARGE NOTE PROVIDER - CARE PROVIDERS DIRECT ADDRESSES
,[email or web address],DirectAddress_Unknown ,[email or web address],DirectAddress_Unknown,DirectAddress_Unknown

## 2019-08-27 NOTE — PROGRESS NOTE ADULT - ASSESSMENT
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 Acute hypoxic respiratory failure likely due to combination of ADHF HFpEF + IPF - stopped IV, given lasix, on IV steroids  -->, no evidence of PNA on CT chest, sepsis ruled out  --> observe off abx    2. Afib with RVR - rate controlled, s/p Watchman off AC    3. Anemia   ->start iron     4. Hx of ETOH abuse with cirrhosis  -> stable    5. Right thyroid 1.4 cm lesion - outpt US

## 2019-08-27 NOTE — PROGRESS NOTE ADULT - ASSESSMENT
63yo male with PMH of afib s/p watchman device , history of alcoholism in past , now sober , h/o tobacco abuse stopped many years ago , COPD , hx of BKA due to injury , diastolic CHF (EF 60-65%) presents to the ED with c/o SOB that started suddenly progressively got worse , he felt chills , night sweats , no fever at home but was febrile in ED , progressively worsening shortness of breathe, increase weight gain, orthopnea, he sits up to sleep, no significant LE edema. 65yo male with PMH of afib s/p watchman device , history of alcoholism in past , now sober , h/o tobacco abuse stopped many years ago , COPD , hx of BKA due to injury , diastolic CHF (EF 60-65%) presents to the ED with c/o SOB that started suddenly progressively got worse , he felt chills , night sweats , no fever at home but was febrile in ED , progressively worsening shortness of breath,  weight gain and orthopnea

## 2019-08-27 NOTE — DISCHARGE NOTE PROVIDER - CARE PROVIDER_API CALL
Antoni Beal)  Cardiology; Internal Medicine  [address] [place] [address], [address] [id number]  Phone: [phone]  Fax: [phone]  Follow Up Time:     primary care,   Phone: (   )    -  Fax: (   )    -  Follow Up Time: Atnoni Beal)  Cardiology; Internal Medicine  [address] [facility], [address]  [place], [place] [id number]  Phone: [phone]  Fax: [phone]  Follow Up Time:     primary care,   Phone: (   )    -  Fax: (   )    -  Follow Up Time:     Huy Coats)  Internal Medicine; Pulmonary Disease  [address] [place] [address]  Phone: [phone]  Fax: [phone]  Follow Up Time:

## 2019-08-27 NOTE — DISCHARGE NOTE PROVIDER - PROVIDER TOKENS
PROVIDER:[TOKEN:[[id number]]],FREE:[LAST:[primary care],PHONE:[(   )    -],FAX:[(   )    -]] PROVIDER:[TOKEN:[[id number]]],FREE:[LAST:[primary care],PHONE:[(   )    -],FAX:[(   )    -]],PROVIDER:[TOKEN:[[id number]]]

## 2019-08-28 LAB
ANION GAP SERPL CALC-SCNC: 10 MMOL/L — SIGNIFICANT CHANGE UP (ref 5–17)
BUN SERPL-MCNC: 25 MG/DL — HIGH (ref 8–20)
CALCIUM SERPL-MCNC: 8.9 MG/DL — SIGNIFICANT CHANGE UP (ref 8.6–10.2)
CHLORIDE SERPL-SCNC: 93 MMOL/L — LOW (ref 98–107)
CO2 SERPL-SCNC: 33 MMOL/L — HIGH (ref 22–29)
CREAT SERPL-MCNC: 0.71 MG/DL — SIGNIFICANT CHANGE UP (ref 0.5–1.3)
GLUCOSE SERPL-MCNC: 145 MG/DL — HIGH (ref 70–115)
MAGNESIUM SERPL-MCNC: 1.7 MG/DL — LOW (ref 1.8–2.6)
POTASSIUM SERPL-MCNC: 4.4 MMOL/L — SIGNIFICANT CHANGE UP (ref 3.5–5.3)
POTASSIUM SERPL-SCNC: 4.4 MMOL/L — SIGNIFICANT CHANGE UP (ref 3.5–5.3)
SODIUM SERPL-SCNC: 136 MMOL/L — SIGNIFICANT CHANGE UP (ref 135–145)

## 2019-08-28 PROCEDURE — 71046 X-RAY EXAM CHEST 2 VIEWS: CPT | Mod: 26

## 2019-08-28 PROCEDURE — 99233 SBSQ HOSP IP/OBS HIGH 50: CPT

## 2019-08-28 PROCEDURE — 99232 SBSQ HOSP IP/OBS MODERATE 35: CPT

## 2019-08-28 RX ORDER — ACETAZOLAMIDE 250 MG/1
250 TABLET ORAL ONCE
Refills: 0 | Status: COMPLETED | OUTPATIENT
Start: 2019-08-28 | End: 2019-08-28

## 2019-08-28 RX ORDER — MAGNESIUM OXIDE 400 MG ORAL TABLET 241.3 MG
400 TABLET ORAL
Refills: 0 | Status: COMPLETED | OUTPATIENT
Start: 2019-08-28 | End: 2019-08-29

## 2019-08-28 RX ADMIN — MAGNESIUM OXIDE 400 MG ORAL TABLET 400 MILLIGRAM(S): 241.3 TABLET ORAL at 11:52

## 2019-08-28 RX ADMIN — CLOPIDOGREL BISULFATE 75 MILLIGRAM(S): 75 TABLET, FILM COATED ORAL at 11:52

## 2019-08-28 RX ADMIN — Medication 3 MILLILITER(S): at 00:19

## 2019-08-28 RX ADMIN — Medication 0.5 MILLIGRAM(S): at 02:21

## 2019-08-28 RX ADMIN — Medication 90 MILLIGRAM(S): at 11:52

## 2019-08-28 RX ADMIN — GABAPENTIN 400 MILLIGRAM(S): 400 CAPSULE ORAL at 11:52

## 2019-08-28 RX ADMIN — PANTOPRAZOLE SODIUM 40 MILLIGRAM(S): 20 TABLET, DELAYED RELEASE ORAL at 11:52

## 2019-08-28 RX ADMIN — Medication 90 MILLIGRAM(S): at 23:21

## 2019-08-28 RX ADMIN — Medication 325 MILLIGRAM(S): at 23:22

## 2019-08-28 RX ADMIN — Medication 3 MILLILITER(S): at 15:31

## 2019-08-28 RX ADMIN — Medication 325 MILLIGRAM(S): at 05:46

## 2019-08-28 RX ADMIN — Medication 40 MILLIGRAM(S): at 05:46

## 2019-08-28 RX ADMIN — GABAPENTIN 400 MILLIGRAM(S): 400 CAPSULE ORAL at 05:46

## 2019-08-28 RX ADMIN — Medication 40 MILLIGRAM(S): at 05:45

## 2019-08-28 RX ADMIN — Medication 3 MILLILITER(S): at 08:34

## 2019-08-28 RX ADMIN — Medication 3 MILLILITER(S): at 12:35

## 2019-08-28 RX ADMIN — TAMSULOSIN HYDROCHLORIDE 0.4 MILLIGRAM(S): 0.4 CAPSULE ORAL at 23:22

## 2019-08-28 RX ADMIN — Medication 40 MILLIGRAM(S): at 18:31

## 2019-08-28 RX ADMIN — ENOXAPARIN SODIUM 40 MILLIGRAM(S): 100 INJECTION SUBCUTANEOUS at 23:21

## 2019-08-28 RX ADMIN — ACETAZOLAMIDE 105 MILLIGRAM(S): 250 TABLET ORAL at 13:21

## 2019-08-28 RX ADMIN — Medication 650 MILLIGRAM(S): at 02:21

## 2019-08-28 RX ADMIN — Medication 325 MILLIGRAM(S): at 11:52

## 2019-08-28 RX ADMIN — Medication 3 MILLILITER(S): at 04:01

## 2019-08-28 RX ADMIN — Medication 3 MILLILITER(S): at 20:39

## 2019-08-28 RX ADMIN — MAGNESIUM OXIDE 400 MG ORAL TABLET 400 MILLIGRAM(S): 241.3 TABLET ORAL at 23:22

## 2019-08-28 RX ADMIN — MAGNESIUM OXIDE 400 MG ORAL TABLET 400 MILLIGRAM(S): 241.3 TABLET ORAL at 18:30

## 2019-08-28 RX ADMIN — Medication 90 MILLIGRAM(S): at 05:45

## 2019-08-28 RX ADMIN — Medication 650 MILLIGRAM(S): at 03:30

## 2019-08-28 RX ADMIN — GABAPENTIN 400 MILLIGRAM(S): 400 CAPSULE ORAL at 23:22

## 2019-08-28 NOTE — PROGRESS NOTE ADULT - SUBJECTIVE AND OBJECTIVE BOX
[facility]  [facility]AC[facility]ITY PRACT[facility]  [address], [place] [address]    REASON FOR VISIT:  Folllow up on chf  UPDATE:  still hypoxic and anemic.   TELEMETRY MONITORING:  Atrial fib with controlled ventricular response    ROS:  No fever chills  Cardiac  No cp or palp  + sob  Resp  no cough no mucus production  ++ wheeze  Gi  no abd pain no melana  08-27                        9.2    8.87  )-----------( 173      ( 27 Aug 2019 06:06 )             31.5   N=x    ; L=x        28 Aug 2019 08:09    136    |  93     |  25.0   ----------------------------<  145    4.4     |  33.0   |  0.71     Ca    8.9        28 Aug 2019 08:09  Mg     1.7       28 Aug 2019 08:09    TPro  6.4    /  Alb  3.3    /  TBili  0.3    /  DBili  x      /  AST  29     /  ALT  28     /  AlkPhos  95     27 Aug 2019 06:06      Hepatic panel: 27 Aug 2019 06:06  6.4   | 3.3                            0.3   | 0.3  /x                              29    | 28                                /95   \par                               MEDICATIONS  (STANDING):  diltiazem    Tablet 90 milliGRAM(s) Oral three times a day  furosemide    Tablet 40 milliGRAM(s) Oral daily  tamsulosin Oral Tab/Cap - Peds 0.4 milliGRAM(s) Oral at bedtime    ALBUTerol/ipratropium for Nebulization  pantoprazole   Suspension  clopidogrel Tablet  enoxaparin Injectable  ferrous    sulfate  ferrous sulfate Oral Tab/Cap - Peds  gabapentin  magnesium oxide  methylPREDNISolone sodium succinate Injectable    PRN: acetaminophen   Tablet .. PRN  ALPRAZolam PRN    Vital Signs Last 24 Hrs  T(C): 30 (08-28-19 @ 15:23), Max: 36.7 (08-28-19 @ 05:44)  T(F): 86 (08-28-19 @ 15:23), Max: 98 (08-28-19 @ 05:44)  HR: 93 (08-28-19 @ 20:40) (74 - 107)  BP: 131/72 (08-28-19 @ 15:23) (131/72 - 137/69)  BP(mean): --  RR: 18 (08-28-19 @ 15:23) (18 - 18)  SpO2: 96% (08-28-19 @ 20:40) (94% - 98%)      HEENT Head atraumatic eomi, oral mucosa moist  CV S1& s2 irregular  RESP  Expiratory wheeze  GI  Soft active bowel sounds non tender  EXT  No clubbing/Cyanosis /Edema  NEURO  Alert oriented  s/p right bka  left one plus edema    < from: TTE Echo w/Cont Complete (08.26.19 @ 09:09) >   1. Left ventricular ejection fraction, by visual estimation, is 60 to   65%.   2. Normal global left ventricular systolic function.   3. The mitral in-flow pattern reveals no discernable A-wave, therefore   no comment on diastolic function can be made.   4. Mild mitral annular calcification.   5. Mild thickening of the anterior and posterior mitral valve leaflets.   6. The tricuspid valve was poorly seen. In the subcostal view it   appeared nl. No gross TR was noted.   7. Dilatation of the ascending aorta.   8. Estimated pulmonary artery systolic pressure is 57.8 mmHg assuming a   right atrial pressure of 15 mmHg, which is consistent with moderate   pulmonary hypertension.   9. Pulmonary hypertension is present.      Nst negative for ischemia

## 2019-08-28 NOTE — PROGRESS NOTE ADULT - ASSESSMENT
63yo male with PMH of afib s/p watchman device , history of alcoholism in past , now sober , h/o tobacco abuse stopped many years ago , COPD , hx of BKA due to injury , diastolic CHF (EF 60-65%) presents to the ED with c/o SOB that started suddenly progressively got worse , he felt chills , night sweats , no fever at home but was febrile in ED , progressively worsening shortness of breath,  weight gain and orthopnea

## 2019-08-28 NOTE — PROGRESS NOTE ADULT - SUBJECTIVE AND OBJECTIVE BOX
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 CT chest negative for PE and showed pulmonary fibrosis , pulmonary HTN.   patient had a stress test which was negative    patient states feeling better    REVIEW OF SYSTEMS:    CONSTITUTIONAL: No fever, weight loss, or fatigue  RESPIRATORY: No cough, wheezing, hemoptysis; No shortness of breath  CARDIOVASCULAR: No chest pain, palpitations  GASTROINTESTINAL: No abdominal or epigastric pain. No nausea, vomiting  NEUROLOGICAL: No headaches, memory loss, loss of strength.  MISCELLANEOUS:      Vital Signs Last 24 Hrs  T(C): 36.6 (29 Aug 2019 06:32), Max: 36.7 (28 Aug 2019 11:00)  T(F): 97.8 (29 Aug 2019 06:32), Max: 98 (28 Aug 2019 11:00)  HR: 108 (29 Aug 2019 06:32) (74 - 108)  BP: 134/78 (29 Aug 2019 06:32) (131/72 - 138/62)  BP(mean): --  RR: 18 (29 Aug 2019 06:32) (18 - 18)  SpO2: 98% (29 Aug 2019 06:32) (94% - 98%)    PHYSICAL EXAM:    GENERAL: NAD, well-groomed  HEENT: PERRL, +EOMI  NECK: soft, Supple, No JVD,   CHEST/LUNG: clear  HEART: S1S2+, Regular rate and rhythm; No murmurs, rubs, or gallops  ABDOMEN: Soft, Nontender, Nondistended; Bowel sounds present  EXTREMITIES:  left bka  SKIN: No rashes or lesions  NEURO: AAOX3, no focal deficits,   PSYCH: normal mood        LABS:    08-28    136  |  93<L>  |  25.0<H>  ----------------------------<  145<H>  4.4   |  33.0<H>  |  0.71    Ca    8.9      28 Aug 2019 08:09  Mg     1.7     08-28        MEDICATIONS  (STANDING):  ALBUTerol/ipratropium for Nebulization 3 milliLiter(s) Nebulizer every 4 hours  clopidogrel Tablet 75 milliGRAM(s) Oral daily  diltiazem    Tablet 90 milliGRAM(s) Oral three times a day  enoxaparin Injectable 40 milliGRAM(s) SubCutaneous daily  ferrous    sulfate 325 milliGRAM(s) Oral two times a day  ferrous sulfate Oral Tab/Cap - Peds 325 milliGRAM(s) Oral daily  furosemide    Tablet 40 milliGRAM(s) Oral daily  gabapentin 400 milliGRAM(s) Oral three times a day  magnesium oxide 400 milliGRAM(s) Oral three times a day with meals  pantoprazole   Suspension 40 milliGRAM(s) Oral daily  tamsulosin Oral Tab/Cap - Peds 0.4 milliGRAM(s) Oral at bedtime    MEDICATIONS  (PRN):  acetaminophen   Tablet .. 650 milliGRAM(s) Oral every 6 hours PRN Temp greater or equal to 38C (100.4F), Mild Pain (1 - 3)  ALPRAZolam 0.5 milliGRAM(s) Oral every 12 hours PRN anxiety      RADIOLOGY & ADDITIONAL TESTS:

## 2019-08-28 NOTE — PROGRESS NOTE ADULT - ASSESSMENT
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 Acute hypoxic respiratory failure likely due to combination of ADHF HFpEF + IPF - stopped IV,  -->, no evidence of PNA on CT chest, sepsis ruled out  --> resolved    2. Afib with RVR - rate controlled, s/p Watchman off AC    3. Anemia   ->iron    4. Hx of ETOH abuse with cirrhosis  -> stable    5. Right thyroid 1.4 cm lesion - outpt US    dc

## 2019-08-29 PROCEDURE — 99233 SBSQ HOSP IP/OBS HIGH 50: CPT

## 2019-08-29 PROCEDURE — 99232 SBSQ HOSP IP/OBS MODERATE 35: CPT

## 2019-08-29 RX ORDER — ACETAZOLAMIDE 250 MG/1
250 TABLET ORAL ONCE
Refills: 0 | Status: COMPLETED | OUTPATIENT
Start: 2019-08-29 | End: 2019-08-29

## 2019-08-29 RX ADMIN — PANTOPRAZOLE SODIUM 40 MILLIGRAM(S): 20 TABLET, DELAYED RELEASE ORAL at 13:08

## 2019-08-29 RX ADMIN — GABAPENTIN 400 MILLIGRAM(S): 400 CAPSULE ORAL at 21:25

## 2019-08-29 RX ADMIN — MAGNESIUM OXIDE 400 MG ORAL TABLET 400 MILLIGRAM(S): 241.3 TABLET ORAL at 06:03

## 2019-08-29 RX ADMIN — Medication 90 MILLIGRAM(S): at 21:25

## 2019-08-29 RX ADMIN — Medication 40 MILLIGRAM(S): at 06:03

## 2019-08-29 RX ADMIN — GABAPENTIN 400 MILLIGRAM(S): 400 CAPSULE ORAL at 06:03

## 2019-08-29 RX ADMIN — Medication 3 MILLILITER(S): at 15:03

## 2019-08-29 RX ADMIN — CLOPIDOGREL BISULFATE 75 MILLIGRAM(S): 75 TABLET, FILM COATED ORAL at 13:05

## 2019-08-29 RX ADMIN — Medication 3 MILLILITER(S): at 00:10

## 2019-08-29 RX ADMIN — Medication 3 MILLILITER(S): at 13:23

## 2019-08-29 RX ADMIN — ENOXAPARIN SODIUM 40 MILLIGRAM(S): 100 INJECTION SUBCUTANEOUS at 21:25

## 2019-08-29 RX ADMIN — Medication 3 MILLILITER(S): at 10:15

## 2019-08-29 RX ADMIN — Medication 90 MILLIGRAM(S): at 13:05

## 2019-08-29 RX ADMIN — MAGNESIUM OXIDE 400 MG ORAL TABLET 400 MILLIGRAM(S): 241.3 TABLET ORAL at 13:05

## 2019-08-29 RX ADMIN — Medication 3 MILLILITER(S): at 20:29

## 2019-08-29 RX ADMIN — Medication 650 MILLIGRAM(S): at 06:09

## 2019-08-29 RX ADMIN — TAMSULOSIN HYDROCHLORIDE 0.4 MILLIGRAM(S): 0.4 CAPSULE ORAL at 21:25

## 2019-08-29 RX ADMIN — Medication 325 MILLIGRAM(S): at 12:45

## 2019-08-29 RX ADMIN — Medication 650 MILLIGRAM(S): at 06:40

## 2019-08-29 RX ADMIN — GABAPENTIN 400 MILLIGRAM(S): 400 CAPSULE ORAL at 13:05

## 2019-08-29 RX ADMIN — Medication 90 MILLIGRAM(S): at 06:03

## 2019-08-29 RX ADMIN — ACETAZOLAMIDE 105 MILLIGRAM(S): 250 TABLET ORAL at 13:02

## 2019-08-29 RX ADMIN — Medication 3 MILLILITER(S): at 04:05

## 2019-08-29 NOTE — PROGRESS NOTE ADULT - SUBJECTIVE AND OBJECTIVE BOX
STONE DIAZ    [id number]    64y      Male    INTERVAL HPI/OVERNIGHT EVENTS:    64 year old male with PMH of afib s/p watchman device , history of alcoholism in past , now sober , h/o tobacco abuse stopped many years ago , COPD , hx of Left BKA due to injury , diastolic CHF presented to hospital with c/o SOB. CT chest negative for PE and showed pulmonary fibrosis , pulmonary HTN.   patient had a stress test which was negative and was cleared by cardio for dc home. Patient seen at bedside and states he doesnt feel ready to go home.   Patient denies chest pain     REVIEW OF SYSTEMS:    CONSTITUTIONAL: No fever, weight loss, or fatigue  RESPIRATORY: No cough, wheezing, hemoptysis; No shortness of breath  CARDIOVASCULAR: No chest pain, palpitations  GASTROINTESTINAL: No abdominal or epigastric pain. No nausea, vomiting  NEUROLOGICAL: No headaches, memory loss, loss of strength.  MISCELLANEOUS:      Vital Signs Last 24 Hrs  T(C): 36.4 (29 Aug 2019 10:22), Max: 36.6 (29 Aug 2019 06:32)  T(F): 97.6 (29 Aug 2019 10:22), Max: 97.8 (29 Aug 2019 06:32)  HR: 102 (29 Aug 2019 13:01) (82 - 108)  BP: 132/70 (29 Aug 2019 13:01) (113/62 - 138/62)  BP(mean): --  RR: 18 (29 Aug 2019 13:01) (18 - 18)  SpO2: 97% (29 Aug 2019 13:01) (94% - 98%)    PHYSICAL EXAM:    HEENT: PERRL, +EOMI  NECK: soft, Supple, No JVD,   CHEST/LUNG: clear  HEART: S1S2+, Regular rate and rhythm; No murmurs, rubs, or gallops  ABDOMEN: Soft, Nontender, Nondistended; Bowel sounds present  EXTREMITIES:  left bka  SKIN: No rashes or lesions  NEURO: AAOX3, no focal deficits,   PSYCH: normal mood      LABS:    08-28    136  |  93<L>  |  25.0<H>  ----------------------------<  145<H>  4.4   |  33.0<H>  |  0.71    Ca    8.9      28 Aug 2019 08:09  Mg     1.7     08-28        MEDICATIONS  (STANDING):  ALBUTerol/ipratropium for Nebulization 3 milliLiter(s) Nebulizer every 4 hours  clopidogrel Tablet 75 milliGRAM(s) Oral daily  diltiazem    Tablet 90 milliGRAM(s) Oral three times a day  enoxaparin Injectable 40 milliGRAM(s) SubCutaneous daily  ferrous    sulfate 325 milliGRAM(s) Oral two times a day  ferrous sulfate Oral Tab/Cap - Peds 325 milliGRAM(s) Oral daily  furosemide    Tablet 40 milliGRAM(s) Oral daily  gabapentin 400 milliGRAM(s) Oral three times a day  magnesium oxide 400 milliGRAM(s) Oral three times a day with meals  pantoprazole   Suspension 40 milliGRAM(s) Oral daily  tamsulosin Oral Tab/Cap - Peds 0.4 milliGRAM(s) Oral at bedtime    MEDICATIONS  (PRN):  acetaminophen   Tablet .. 650 milliGRAM(s) Oral every 6 hours PRN Temp greater or equal to 38C (100.4F), Mild Pain (1 - 3)  ALPRAZolam 0.5 milliGRAM(s) Oral every 12 hours PRN anxiety      RADIOLOGY & ADDITIONAL TESTS:

## 2019-08-29 NOTE — PROGRESS NOTE ADULT - ASSESSMENT
1. Acute hypoxic respiratory failure likely due to combination of ADHF HFpEF + IPF - stopped IV,  -->, no evidence of PNA on CT chest, sepsis ruled out  --> patient had pulmonary fibrosis on ct likely from prior work exposure,   will speak to oxygen people and will likely need home o2 on dc     2. Afib with RVR - rate controlled, s/p Watchman off AC    3. Anemia   ->iron    4. Hx of ETOH abuse with cirrhosis  -> stable    5. Right thyroid 1.4 cm lesion - outpt US    dc tomorrow with resumption of home aides. patient will likley need home o2

## 2019-08-29 NOTE — PROGRESS NOTE ADULT - SUBJECTIVE AND OBJECTIVE BOX
[facility]  [address], [place] [address]    REASON FOR VISIT:  Folllow up on chf  UPDATE:  still hypoxic and anemic.   responded to diamox   TELEMETRY MONITORING:  Atrial fib with controlled ventricular response    ROS:  No fever chills  Cardiac  No cp or palp  + sob  Resp  no cough no mucus production  ++ wheeze  Gi  no abd pain no melana  08-27    28 Aug 2019 08:09    136    |  93     |  25.0   ----------------------------<  145    4.4     |  33.0   |  0.71     MEDICATIONS  (STANDING):  diltiazem    Tablet 90 milliGRAM(s) Oral three times a day  furosemide    T    ablet 40 milliGRAM(s) Oral daily  tamsulosin Oral Tab/Cap - Peds 0.4 milliGRAM(s) Oral at bedtime    ALBUTerol/ipratropium for Nebulization  pantoprazole   Suspension  clopidogrel Tablet  enoxaparin Injectable  ferrous    sulfate  ferrous sulfate Oral Tab/Cap - Peds  gabapentin  magnesium oxide    PRN: acetaminophen   Tablet .. PRN    Vital Signs Last 24 Hrs  T(C): 36.4 (08-29-19 @ 15:59), Max: 36.6 (08-29-19 @ 06:32)  T(F): 97.5 (08-29-19 @ 15:59), Max: 97.8 (08-29-19 @ 06:32)  HR: 115 (08-29-19 @ 15:59) (82 - 115)  BP: 121/65 (08-29-19 @ 15:59) (113/62 - 138/62)  BP(mean): --  RR: 18 (08-29-19 @ 15:59) (18 - 18)  SpO2: 94% (08-29-19 @ 15:59) (94% - 98%)      HEENT Head atraumatic eomi, oral mucosa moist  CV S1& s2 irregular  RESP  Expiratory wheeze  GI  Soft active bowel sounds non tender  EXT  No clubbing/Cyanosis /Edema  NEURO  Alert oriented  s/p right bka  left one plus edema    < from: TTE Echo w/Cont Complete (08.26.19 @ 09:09) >   1. Left ventricular ejection fraction, by visual estimation, is 60 to   65%.   2. Normal global left ventricular systolic function.   3. The mitral in-flow pattern reveals no discernable A-wave, therefore   no comment on diastolic function can be made.   4. Mild mitral annular calcification.   5. Mild thickening of the anterior and posterior mitral valve leaflets.   6. The tricuspid valve was poorly seen. In the subcostal view it   appeared nl. No gross TR was noted.   7. Dilatation of the ascending aorta.   8. Estimated pulmonary artery systolic pressure is 57.8 mmHg assuming a   right atrial pressure of 15 mmHg, which is consistent with moderate   pulmonary hypertension.   9. Pulmonary hypertension is present.      Nst negative for ischemia

## 2019-08-29 NOTE — PROGRESS NOTE ADULT - ASSESSMENT
65yo male with PMH of afib s/p watchman device , history of alcoholism in past , now sober , h/o tobacco abuse stopped many years ago , COPD , hx of BKA due to injury , diastolic CHF (EF 60-65%) presents to the ED with c/o SOB that started suddenly progressively got worse , he felt chills , night sweats , no fever at home but was febrile in ED , progressively worsening shortness of breath,  weight gain and orthopnea

## 2019-08-30 LAB
ALBUMIN SERPL ELPH-MCNC: 3.4 G/DL — SIGNIFICANT CHANGE UP (ref 3.3–5.2)
ALP SERPL-CCNC: 92 U/L — SIGNIFICANT CHANGE UP (ref 40–120)
ALT FLD-CCNC: 31 U/L — SIGNIFICANT CHANGE UP
ANION GAP SERPL CALC-SCNC: 9 MMOL/L — SIGNIFICANT CHANGE UP (ref 5–17)
AST SERPL-CCNC: 21 U/L — SIGNIFICANT CHANGE UP
BILIRUB SERPL-MCNC: 0.3 MG/DL — LOW (ref 0.4–2)
BUN SERPL-MCNC: 30 MG/DL — HIGH (ref 8–20)
CALCIUM SERPL-MCNC: 8.7 MG/DL — SIGNIFICANT CHANGE UP (ref 8.6–10.2)
CHLORIDE SERPL-SCNC: 97 MMOL/L — LOW (ref 98–107)
CO2 SERPL-SCNC: 31 MMOL/L — HIGH (ref 22–29)
CREAT SERPL-MCNC: 0.95 MG/DL — SIGNIFICANT CHANGE UP (ref 0.5–1.3)
DEPRECATED S PNEUM 1 IGG SER-MCNC: 1 MCG/ML — SIGNIFICANT CHANGE UP
DEPRECATED S PNEUM12 IGG SER-MCNC: <0.4 MCG/ML — SIGNIFICANT CHANGE UP
DEPRECATED S PNEUM14 IGG SER-MCNC: 0.6 MCG/ML — SIGNIFICANT CHANGE UP
DEPRECATED S PNEUM17 IGG SER-MCNC: 4.7 MCG/ML — SIGNIFICANT CHANGE UP
DEPRECATED S PNEUM19 IGG SER-MCNC: 1.7 MCG/ML — SIGNIFICANT CHANGE UP
DEPRECATED S PNEUM19 IGG SER-MCNC: 12.8 MCG/ML — SIGNIFICANT CHANGE UP
DEPRECATED S PNEUM2 IGG SER-MCNC: 14.3 MCG/ML — SIGNIFICANT CHANGE UP
DEPRECATED S PNEUM20 IGG SER-MCNC: 0.9 MCG/ML — SIGNIFICANT CHANGE UP
DEPRECATED S PNEUM22 IGG SER-MCNC: 4.2 MCG/ML — SIGNIFICANT CHANGE UP
DEPRECATED S PNEUM23 IGG SER-MCNC: SIGNIFICANT CHANGE UP MCG/ML
DEPRECATED S PNEUM3 IGG SER-MCNC: 0.4 MCG/ML — SIGNIFICANT CHANGE UP
DEPRECATED S PNEUM4 IGG SER-MCNC: 0.4 MCG/ML — SIGNIFICANT CHANGE UP
DEPRECATED S PNEUM5 IGG SER-MCNC: 0.9 MCG/ML — SIGNIFICANT CHANGE UP
DEPRECATED S PNEUM8 IGG SER-MCNC: 1.4 MCG/ML — SIGNIFICANT CHANGE UP
DEPRECATED S PNEUM9 IGG SER-MCNC: 1 MCG/ML — SIGNIFICANT CHANGE UP
DEPRECATED S PNEUM9 IGG SER-MCNC: 1.7 MCG/ML — SIGNIFICANT CHANGE UP
GLUCOSE SERPL-MCNC: 109 MG/DL — SIGNIFICANT CHANGE UP (ref 70–115)
POTASSIUM SERPL-MCNC: 4.3 MMOL/L — SIGNIFICANT CHANGE UP (ref 3.5–5.3)
POTASSIUM SERPL-SCNC: 4.3 MMOL/L — SIGNIFICANT CHANGE UP (ref 3.5–5.3)
PROT SERPL-MCNC: 6.3 G/DL — LOW (ref 6.6–8.7)
S PNEUM SEROTYPE IGG SER-IMP: 0.4 MCG/ML — SIGNIFICANT CHANGE UP
S PNEUM SEROTYPE IGG SER-IMP: 1 MCG/ML — SIGNIFICANT CHANGE UP
S PNEUM SEROTYPE IGG SER-IMP: 1.7 MCG/ML — SIGNIFICANT CHANGE UP
S PNEUM SEROTYPE IGG SER-IMP: 2 MCG/ML — SIGNIFICANT CHANGE UP
S PNEUM SEROTYPE IGG SER-IMP: 2.5 MCG/ML — SIGNIFICANT CHANGE UP
S PNEUM SEROTYPE IGG SER-IMP: 3.2 MCG/ML — SIGNIFICANT CHANGE UP
S PNEUM SEROTYPE IGG SER-IMP: <0.4 MCG/ML — SIGNIFICANT CHANGE UP
SODIUM SERPL-SCNC: 137 MMOL/L — SIGNIFICANT CHANGE UP (ref 135–145)

## 2019-08-30 PROCEDURE — 99223 1ST HOSP IP/OBS HIGH 75: CPT

## 2019-08-30 PROCEDURE — 99233 SBSQ HOSP IP/OBS HIGH 50: CPT

## 2019-08-30 RX ADMIN — Medication 650 MILLIGRAM(S): at 00:34

## 2019-08-30 RX ADMIN — Medication 325 MILLIGRAM(S): at 18:43

## 2019-08-30 RX ADMIN — ENOXAPARIN SODIUM 40 MILLIGRAM(S): 100 INJECTION SUBCUTANEOUS at 12:14

## 2019-08-30 RX ADMIN — Medication 0.5 MILLIGRAM(S): at 00:33

## 2019-08-30 RX ADMIN — Medication 3 MILLILITER(S): at 12:33

## 2019-08-30 RX ADMIN — Medication 325 MILLIGRAM(S): at 12:15

## 2019-08-30 RX ADMIN — GABAPENTIN 400 MILLIGRAM(S): 400 CAPSULE ORAL at 14:56

## 2019-08-30 RX ADMIN — GABAPENTIN 400 MILLIGRAM(S): 400 CAPSULE ORAL at 21:06

## 2019-08-30 RX ADMIN — Medication 90 MILLIGRAM(S): at 14:57

## 2019-08-30 RX ADMIN — Medication 40 MILLIGRAM(S): at 05:48

## 2019-08-30 RX ADMIN — TAMSULOSIN HYDROCHLORIDE 0.4 MILLIGRAM(S): 0.4 CAPSULE ORAL at 21:06

## 2019-08-30 RX ADMIN — Medication 3 MILLILITER(S): at 09:45

## 2019-08-30 RX ADMIN — Medication 650 MILLIGRAM(S): at 01:31

## 2019-08-30 RX ADMIN — GABAPENTIN 400 MILLIGRAM(S): 400 CAPSULE ORAL at 05:48

## 2019-08-30 RX ADMIN — Medication 90 MILLIGRAM(S): at 05:48

## 2019-08-30 RX ADMIN — Medication 3 MILLILITER(S): at 20:22

## 2019-08-30 RX ADMIN — Medication 3 MILLILITER(S): at 00:01

## 2019-08-30 RX ADMIN — Medication 325 MILLIGRAM(S): at 05:48

## 2019-08-30 RX ADMIN — Medication 40 MILLIGRAM(S): at 18:44

## 2019-08-30 RX ADMIN — Medication 90 MILLIGRAM(S): at 21:06

## 2019-08-30 RX ADMIN — CLOPIDOGREL BISULFATE 75 MILLIGRAM(S): 75 TABLET, FILM COATED ORAL at 12:14

## 2019-08-30 RX ADMIN — Medication 650 MILLIGRAM(S): at 06:35

## 2019-08-30 RX ADMIN — Medication 3 MILLILITER(S): at 04:15

## 2019-08-30 RX ADMIN — Medication 650 MILLIGRAM(S): at 05:48

## 2019-08-30 RX ADMIN — PANTOPRAZOLE SODIUM 40 MILLIGRAM(S): 20 TABLET, DELAYED RELEASE ORAL at 12:15

## 2019-08-30 NOTE — PROGRESS NOTE ADULT - ASSESSMENT
. Acute hypoxic respiratory failure likely due to combination of ADHF HFpEF + pulmonary fibrosis - stopped IV,  -->, no evidence of PNA on CT chest, sepsis ruled out  --> patient had pulmonary fibrosis on ct likely from prior work exposure- has been on steroid   will speak to oxygen people and will likely need home o2 on dc     2. Afib with RVR - rate controlled, s/p Watchman off AC    3. Anemia   ->iron    4. Hx of ETOH abuse with cirrhosis  -> stable    5. Right thyroid 1.4 cm lesion - outpt US

## 2019-08-30 NOTE — PROGRESS NOTE ADULT - SUBJECTIVE AND OBJECTIVE BOX
STONE DIAZ Patient is a 64y old  Male who presents with a chief complaint of shortness of breath (29 Aug 2019 18:21)     HPI:  64 year old male with PMH of afib s/p watchman device , history of alcoholism in past , now sober , h/o tobacco abuse stopped many years ago , COPD , hx of BKA due to injury , diastolic CHF presented to hospital with c/o SOB that started suddenly progressively got worse , he felt chills , night sweats , no fever at home but was febrile in ED , code sepsis called , cultures sent , started on antibiotics .  at the time of my evaluation patient was resting in bed , speaking in full sentences , denies sick contacts , no palpitations . no significant cough , no phlegm production .  no nausea vomiting , no diarrhea , constipation.   CT chest negative for PE . showed pulmonary fibrosis , pulmonary HTN (24 Aug 2019 15:53)    The patient was seen and evaluated   The patient is in no acute distress.  Denied any fever chest pain, palpitations, shortness of breath, abdominal pain, fever, dysuria, cough, edema   Complains of weakness , doesn't know if he's ready to go , states is still SOB    I&O's Summary    29 Aug 2019 07:01  -  30 Aug 2019 07:00  --------------------------------------------------------  IN: 100 mL / OUT: 4750 mL / NET: -4650 mL      Allergies    Ceclor (Rash)  Duricef (Rash)    Intolerances      HEALTH ISSUES - PROBLEM Dx:  COPD exacerbation: COPD exacerbation  Pulmonary hypertension: Pulmonary hypertension  Chronic diastolic congestive heart failure: Chronic diastolic congestive heart failure  Atrial fibrillation with RVR: Atrial fibrillation with RVR        PAST MEDICAL & SURGICAL HISTORY:  Chronic CHF  COPD without exacerbation  Atrial fibrillation  Presence of Watchman left atrial appendage closure device  Hypertension  GERD (gastroesophageal reflux disease)  Chronic obstructive pulmonary disease (COPD)  Anemia  Falls  Meningitis  Collapsed lung  Alcohol withdrawal  Emphysema of lung  Cirrhosis  CHF (congestive heart failure)  Poor historian  Alcohol abuse  Chronic atrial fibrillation  Unilateral amputation of lower extremity below knee  Presence of Watchman left atrial appendage closure device  S/P BKA (below knee amputation) unilateral, left          Vital Signs Last 24 Hrs  T(C): 36.4 (30 Aug 2019 10:30), Max: 36.6 (30 Aug 2019 05:45)  T(F): 97.5 (30 Aug 2019 10:30), Max: 97.8 (30 Aug 2019 05:45)  HR: 94 (30 Aug 2019 12:34) (81 - 115)  BP: 133/80 (30 Aug 2019 10:30) (121/65 - 136/62)  BP(mean): --  RR: 18 (30 Aug 2019 10:30) (18 - 18)  SpO2: 97% (30 Aug 2019 12:34) (92% - 97%)T(C): 36.4 (08-30-19 @ 10:30), Max: 36.6 (08-30-19 @ 05:45)  HR: 94 (08-30-19 @ 12:34) (81 - 115)  BP: 133/80 (08-30-19 @ 10:30) (121/65 - 136/62)  RR: 18 (08-30-19 @ 10:30) (18 - 18)  SpO2: 97% (08-30-19 @ 12:34) (92% - 97%)  Wt(kg): --    PHYSICAL EXAM:    GENERAL: NAD,   HEAD:  Atraumatic, Normocephalic  EYES: EOMI, PERRL  NERVOUS SYSTEM:  Alert & Oriented X3,  Moves upper and lower extremities; CNS-II-XII  CHEST/LUNG: Clear to auscultation bilaterally; No rales, rhonchi, wheezing,   HEART: Regular rate and rhythm; No murmurs,   ABDOMEN: Soft, Nontender, Nondistended; Bowel sounds present  EXTREMITIES:  Peripheral Pulses ,left BKA  Psychiatry- mood and affect appropriate Insight and judgement intact     acetaminophen   Tablet .. 650 milliGRAM(s) Oral every 6 hours PRN  ALBUTerol/ipratropium for Nebulization 3 milliLiter(s) Nebulizer every 4 hours  ALPRAZolam 0.5 milliGRAM(s) Oral every 12 hours PRN  clopidogrel Tablet 75 milliGRAM(s) Oral daily  diltiazem    Tablet 90 milliGRAM(s) Oral three times a day  enoxaparin Injectable 40 milliGRAM(s) SubCutaneous daily  ferrous    sulfate 325 milliGRAM(s) Oral two times a day  ferrous sulfate Oral Tab/Cap - Peds 325 milliGRAM(s) Oral daily  furosemide    Tablet 40 milliGRAM(s) Oral daily  gabapentin 400 milliGRAM(s) Oral three times a day  magnesium oxide 400 milliGRAM(s) Oral three times a day with meals  pantoprazole   Suspension 40 milliGRAM(s) Oral daily  tamsulosin Oral Tab/Cap - Peds 0.4 milliGRAM(s) Oral at bedtime      LABS:      08-30    137  |  97<L>  |  30.0<H>  ----------------------------<  109  4.3   |  31.0<H>  |  0.95    Ca    8.7      30 Aug 2019 11:03    TPro  6.3<L>  /  Alb  3.4  /  TBili  0.3<L>  /  DBili  x   /  AST  21  /  ALT  31  /  AlkPhos  92  08-30    LIVER FUNCTIONS - ( 30 Aug 2019 11:03 )  Alb: 3.4 g/dL / Pro: 6.3 g/dL / ALK PHOS: 92 U/L / ALT: 31 U/L / AST: 21 U/L / GGT: x                   CAPILLARY BLOOD GLUCOSE          RADIOLOGY & ADDITIONAL TESTS:      Consultant notes reviewed    Case discussed with consultant/provider/ family /patient

## 2019-08-31 PROCEDURE — 99233 SBSQ HOSP IP/OBS HIGH 50: CPT

## 2019-08-31 RX ADMIN — Medication 90 MILLIGRAM(S): at 22:15

## 2019-08-31 RX ADMIN — Medication 3 MILLILITER(S): at 15:42

## 2019-08-31 RX ADMIN — Medication 3 MILLILITER(S): at 03:11

## 2019-08-31 RX ADMIN — Medication 3 MILLILITER(S): at 00:10

## 2019-08-31 RX ADMIN — GABAPENTIN 400 MILLIGRAM(S): 400 CAPSULE ORAL at 12:35

## 2019-08-31 RX ADMIN — Medication 650 MILLIGRAM(S): at 22:45

## 2019-08-31 RX ADMIN — Medication 40 MILLIGRAM(S): at 12:34

## 2019-08-31 RX ADMIN — Medication 3 MILLILITER(S): at 23:49

## 2019-08-31 RX ADMIN — TAMSULOSIN HYDROCHLORIDE 0.4 MILLIGRAM(S): 0.4 CAPSULE ORAL at 22:15

## 2019-08-31 RX ADMIN — Medication 325 MILLIGRAM(S): at 05:29

## 2019-08-31 RX ADMIN — PANTOPRAZOLE SODIUM 40 MILLIGRAM(S): 20 TABLET, DELAYED RELEASE ORAL at 12:35

## 2019-08-31 RX ADMIN — Medication 650 MILLIGRAM(S): at 22:15

## 2019-08-31 RX ADMIN — Medication 325 MILLIGRAM(S): at 17:40

## 2019-08-31 RX ADMIN — Medication 40 MILLIGRAM(S): at 05:29

## 2019-08-31 RX ADMIN — Medication 3 MILLILITER(S): at 21:01

## 2019-08-31 RX ADMIN — Medication 3 MILLILITER(S): at 08:09

## 2019-08-31 RX ADMIN — GABAPENTIN 400 MILLIGRAM(S): 400 CAPSULE ORAL at 22:15

## 2019-08-31 RX ADMIN — CLOPIDOGREL BISULFATE 75 MILLIGRAM(S): 75 TABLET, FILM COATED ORAL at 12:34

## 2019-08-31 RX ADMIN — GABAPENTIN 400 MILLIGRAM(S): 400 CAPSULE ORAL at 05:29

## 2019-08-31 RX ADMIN — Medication 90 MILLIGRAM(S): at 12:35

## 2019-08-31 RX ADMIN — Medication 325 MILLIGRAM(S): at 12:35

## 2019-08-31 RX ADMIN — Medication 90 MILLIGRAM(S): at 05:29

## 2019-08-31 RX ADMIN — Medication 650 MILLIGRAM(S): at 05:29

## 2019-08-31 RX ADMIN — ENOXAPARIN SODIUM 40 MILLIGRAM(S): 100 INJECTION SUBCUTANEOUS at 22:15

## 2019-08-31 NOTE — PROGRESS NOTE ADULT - SUBJECTIVE AND OBJECTIVE BOX
Patient: STONE DIAZ [id number] 64y Male                           Internal Medicine Hospitalist Progress Note    Initial HPI:  64 year old male with PMH of afib s/p watchman device , history of alcoholism in past , now sober , h/o tobacco abuse stopped many years ago , COPD , hx of LBKA due to injury , diastolic CHF presented to hospital with c/o SOB that started suddenly progressively got worse.  Admitted for hypoxic respiratory failure due to CHF / COPD / Pulmonary Fibrosis.    Interval History:  Reports feeling much better.  no SOB / cough.  No fever / chills.      ____________________PHYSICAL EXAM:  Vitals reviewed as indicated below  GENERAL:  NAD Alert and Oriented x 3   HEENT: NCAT  CARDIOVASCULAR:  S1, S2  LUNGS: coarse BS b/l  ABDOMEN:  soft, (-) tenderness, (-) distension, (+) bowel sounds, (-) guarding, (-) rebound (-) rigidity  EXTREMITIES:  no cyanosis / clubbing / edema.   ____________________      BACKGROUND:  HEALTH ISSUES - PROBLEM Dx:  COPD exacerbation: COPD exacerbation  Pulmonary hypertension: Pulmonary hypertension  Chronic diastolic congestive heart failure: Chronic diastolic congestive heart failure  Atrial fibrillation with RVR: Atrial fibrillation with RVR        Allergies    Ceclor (Rash)  Duricef (Rash)    Intolerances      PAST MEDICAL & SURGICAL HISTORY:  Chronic CHF  COPD without exacerbation  Atrial fibrillation  Presence of Watchman left atrial appendage closure device  Hypertension  GERD (gastroesophageal reflux disease)  Chronic obstructive pulmonary disease (COPD)  Anemia  Falls  Meningitis  Collapsed lung  Alcohol withdrawal  Emphysema of lung  Cirrhosis  CHF (congestive heart failure)  Poor historian  Alcohol abuse  Chronic atrial fibrillation  Unilateral amputation of lower extremity below knee  Presence of Watchman left atrial appendage closure device  S/P BKA (below knee amputation) unilateral, left        VITALS:  Vital Signs Last 24 Hrs  T(C): 36.4 (31 Aug 2019 10:16), Max: 36.6 (30 Aug 2019 19:51)  T(F): 97.5 (31 Aug 2019 10:16), Max: 97.8 (30 Aug 2019 19:51)  HR: 95 (31 Aug 2019 12:40) (75 - 96)  BP: 150/78 (31 Aug 2019 12:40) (126/52 - 150/78)  BP(mean): --  RR: 20 (31 Aug 2019 12:40) (14 - 20)  SpO2: 84% (31 Aug 2019 11:49) (84% - 98%) Daily     Daily Weight in k.8 (31 Aug 2019 14:54)  CAPILLARY BLOOD GLUCOSE        I&O's Summary    30 Aug 2019 07:01  -  31 Aug 2019 07:00  --------------------------------------------------------  IN: 0 mL / OUT: 3800 mL / NET: -3800 mL          LABS:        137  |  97<L>  |  30.0<H>  ----------------------------<  109  4.3   |  31.0<H>  |  0.95    Ca    8.7      30 Aug 2019 11:03    TPro  6.3<L>  /  Alb  3.4  /  TBili  0.3<L>  /  DBili  x   /  AST  21  /  ALT  31  /  AlkPhos  92        LIVER FUNCTIONS - ( 30 Aug 2019 11:03 )  Alb: 3.4 g/dL / Pro: 6.3 g/dL / ALK PHOS: 92 U/L / ALT: 31 U/L / AST: 21 U/L / GGT: x                     MEDICATIONS:  MEDICATIONS  (STANDING):  ALBUTerol/ipratropium for Nebulization 3 milliLiter(s) Nebulizer every 4 hours  clopidogrel Tablet 75 milliGRAM(s) Oral daily  diltiazem    Tablet 90 milliGRAM(s) Oral three times a day  enoxaparin Injectable 40 milliGRAM(s) SubCutaneous daily  ferrous    sulfate 325 milliGRAM(s) Oral two times a day  ferrous sulfate Oral Tab/Cap - Peds 325 milliGRAM(s) Oral daily  furosemide    Tablet 40 milliGRAM(s) Oral daily  gabapentin 400 milliGRAM(s) Oral three times a day  magnesium oxide 400 milliGRAM(s) Oral three times a day with meals  pantoprazole   Suspension 40 milliGRAM(s) Oral daily  predniSONE   Tablet 40 milliGRAM(s) Oral daily  tamsulosin Oral Tab/Cap - Peds 0.4 milliGRAM(s) Oral at bedtime    MEDICATIONS  (PRN):  acetaminophen   Tablet .. 650 milliGRAM(s) Oral every 6 hours PRN Temp greater or equal to 38C (100.4F), Mild Pain (1 - 3)  ALPRAZolam 0.5 milliGRAM(s) Oral every 12 hours PRN anxiety

## 2019-08-31 NOTE — PROGRESS NOTE ADULT - ASSESSMENT
1. Acute hypoxic respiratory failure likely due to combination of ADHF HFpEF + pulmonary fibrosis.  No evidence of PNA on CT chest, sepsis ruled out.  check SaO2 on RA.  Taper IV Steroids.  Discussed need for outpatient pulmonary followup.  Pt acknowledged understanding.     2. Afib with RVR - rate controlled, s/p Watchman off AC.  Outpatient cardiology followup discussed.     3. Anemia - Monitor CBC.  On Fe supplementation.     4. Hx of ETOH abuse with cirrhosis  -> stable    5. Right thyroid 1.4 cm lesion - Discussed possibilities including malignancy.  outpt US.      > DVT Prophylaxis - Lovenox subcut

## 2019-08-31 NOTE — DIETITIAN INITIAL EVALUATION ADULT. - OTHER INFO
64 year old male with PMH of afib s/p watchman device , history of alcoholism in past, now sober, h/o tobacco abuse stopped many years ago, COPD, hx of BKA due to injury , diastolic CHF presented to hospital with c/o SOB-  Acute hypoxic respiratory failure likely due to combination of ADHF HFpEF + pulmonary fibrosis.    Pt tolerating diet, intake great 100% PO. Pt not interested in weight loss education at this time.   RD to remain available.

## 2019-09-01 LAB
ANION GAP SERPL CALC-SCNC: 10 MMOL/L — SIGNIFICANT CHANGE UP (ref 5–17)
BUN SERPL-MCNC: 25 MG/DL — HIGH (ref 8–20)
CALCIUM SERPL-MCNC: 8.2 MG/DL — LOW (ref 8.6–10.2)
CHLORIDE SERPL-SCNC: 96 MMOL/L — LOW (ref 98–107)
CO2 SERPL-SCNC: 31 MMOL/L — HIGH (ref 22–29)
CREAT SERPL-MCNC: 0.74 MG/DL — SIGNIFICANT CHANGE UP (ref 0.5–1.3)
GLUCOSE SERPL-MCNC: 158 MG/DL — HIGH (ref 70–115)
HCT VFR BLD CALC: 36.1 % — LOW (ref 39–50)
HGB BLD-MCNC: 10.7 G/DL — LOW (ref 13–17)
MCHC RBC-ENTMCNC: 24.4 PG — LOW (ref 27–34)
MCHC RBC-ENTMCNC: 29.6 GM/DL — LOW (ref 32–36)
MCV RBC AUTO: 82.2 FL — SIGNIFICANT CHANGE UP (ref 80–100)
PLATELET # BLD AUTO: 213 K/UL — SIGNIFICANT CHANGE UP (ref 150–400)
POTASSIUM SERPL-MCNC: 4.4 MMOL/L — SIGNIFICANT CHANGE UP (ref 3.5–5.3)
POTASSIUM SERPL-SCNC: 4.4 MMOL/L — SIGNIFICANT CHANGE UP (ref 3.5–5.3)
RBC # BLD: 4.39 M/UL — SIGNIFICANT CHANGE UP (ref 4.2–5.8)
RBC # FLD: 19.9 % — HIGH (ref 10.3–14.5)
SODIUM SERPL-SCNC: 137 MMOL/L — SIGNIFICANT CHANGE UP (ref 135–145)
WBC # BLD: 9.54 K/UL — SIGNIFICANT CHANGE UP (ref 3.8–10.5)
WBC # FLD AUTO: 9.54 K/UL — SIGNIFICANT CHANGE UP (ref 3.8–10.5)

## 2019-09-01 PROCEDURE — 99222 1ST HOSP IP/OBS MODERATE 55: CPT

## 2019-09-01 PROCEDURE — 99232 SBSQ HOSP IP/OBS MODERATE 35: CPT

## 2019-09-01 RX ORDER — FUROSEMIDE 40 MG
20 TABLET ORAL ONCE
Refills: 0 | Status: COMPLETED | OUTPATIENT
Start: 2019-09-01 | End: 2019-09-01

## 2019-09-01 RX ORDER — IPRATROPIUM/ALBUTEROL SULFATE 18-103MCG
3 AEROSOL WITH ADAPTER (GRAM) INHALATION EVERY 6 HOURS
Refills: 0 | Status: DISCONTINUED | OUTPATIENT
Start: 2019-09-01 | End: 2019-09-03

## 2019-09-01 RX ADMIN — PANTOPRAZOLE SODIUM 40 MILLIGRAM(S): 20 TABLET, DELAYED RELEASE ORAL at 12:24

## 2019-09-01 RX ADMIN — Medication 40 MILLIGRAM(S): at 05:29

## 2019-09-01 RX ADMIN — Medication 0.5 MILLIGRAM(S): at 00:11

## 2019-09-01 RX ADMIN — TAMSULOSIN HYDROCHLORIDE 0.4 MILLIGRAM(S): 0.4 CAPSULE ORAL at 22:45

## 2019-09-01 RX ADMIN — Medication 3 MILLILITER(S): at 08:15

## 2019-09-01 RX ADMIN — Medication 325 MILLIGRAM(S): at 18:04

## 2019-09-01 RX ADMIN — Medication 90 MILLIGRAM(S): at 12:24

## 2019-09-01 RX ADMIN — Medication 0.5 MILLIGRAM(S): at 12:24

## 2019-09-01 RX ADMIN — Medication 90 MILLIGRAM(S): at 05:30

## 2019-09-01 RX ADMIN — Medication 3 MILLILITER(S): at 03:01

## 2019-09-01 RX ADMIN — Medication 650 MILLIGRAM(S): at 14:31

## 2019-09-01 RX ADMIN — GABAPENTIN 400 MILLIGRAM(S): 400 CAPSULE ORAL at 22:45

## 2019-09-01 RX ADMIN — Medication 20 MILLIGRAM(S): at 12:27

## 2019-09-01 RX ADMIN — Medication 3 MILLILITER(S): at 12:54

## 2019-09-01 RX ADMIN — GABAPENTIN 400 MILLIGRAM(S): 400 CAPSULE ORAL at 05:28

## 2019-09-01 RX ADMIN — ENOXAPARIN SODIUM 40 MILLIGRAM(S): 100 INJECTION SUBCUTANEOUS at 22:45

## 2019-09-01 RX ADMIN — Medication 650 MILLIGRAM(S): at 22:45

## 2019-09-01 RX ADMIN — Medication 40 MILLIGRAM(S): at 05:30

## 2019-09-01 RX ADMIN — Medication 325 MILLIGRAM(S): at 12:24

## 2019-09-01 RX ADMIN — Medication 90 MILLIGRAM(S): at 22:45

## 2019-09-01 RX ADMIN — Medication 650 MILLIGRAM(S): at 23:00

## 2019-09-01 RX ADMIN — GABAPENTIN 400 MILLIGRAM(S): 400 CAPSULE ORAL at 12:24

## 2019-09-01 RX ADMIN — Medication 650 MILLIGRAM(S): at 12:24

## 2019-09-01 RX ADMIN — Medication 325 MILLIGRAM(S): at 05:29

## 2019-09-01 RX ADMIN — CLOPIDOGREL BISULFATE 75 MILLIGRAM(S): 75 TABLET, FILM COATED ORAL at 12:24

## 2019-09-01 NOTE — PROGRESS NOTE ADULT - ASSESSMENT
1. Acute hypoxic respiratory failure likely due to combination of ADHF HFpEF + pulmonary fibrosis.  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  No evidence of PNA on CT chest, sepsis ruled out.  Taper IV Steroids.  Will likely need outpatient PFTs, Sleep study.  Pulmonary evaluation requested.     1b. VIJAY - discussed reevaluation as outpatient and refitting for CPAP device.  Pt acknowledged understanding.      2. Afib with RVR - rate controlled, s/p Watchman off AC.  Outpatient cardiology followup discussed.   3. Anemia - Monitor CBC.  On Fe supplementation. CBC stable.   4. Hx of ETOH abuse with cirrhosis - -> stable  5. Right thyroid 1.4 cm lesion, mediastinal lymphadenopathy - Discussed possibilities including malignancy.  outpt workup including U/S.  He agreed and acknowledged understanding.  6. H/o BKA - PT followup.  Will add dose of diuretic.  > DVT Prophylaxis - Lovenox subcut    Probable d/c in am pending fitment of prosthetic.

## 2019-09-01 NOTE — CONSULT NOTE ADULT - SUBJECTIVE AND OBJECTIVE BOX
[facility]  INFECTIOUS DISEASES AND INTERNAL MEDICINE at [place]  =======================================================  Kevin Bragg MD  Diplomates American Board of Internal Medicine and Infectious Diseases  Tel: [phone]      Fax: [phone]  =======================================================      [id number]  STONE DIAZ     CC: Patient is a 64y old  Male who presents with a chief complaint of shortness of breath (30 Aug 2019 14:29)    63y/o  Male with h/o of afib s/p watchman device , history of alcoholism in past and now sober, Former smoker, COPD, s/p Left BKA due to injury, chronic diastolic CHF. Patient presented to the ER with c/o SOB that started suddenly progressively got worse. In the ER patient was febrile to 101.2F, no leukocytosis. CT chest negative for PE and showed pulmonary fibrosis , pulmonary HTN. Patient was on Levaquin initially but was stopped once CT Chest reported no pneumonia. Patient was seen by cardiology and had a stress test which was negative. No fevers rest of the hospitalization. No leukocytosis. Patient remains SOB and hypoxic. ID input requested.        Past Medical & Surgical Hx:  Chronic CHF  COPD without exacerbation  Atrial fibrillation  Presence of Watchman left atrial appendage closure device  Hypertension  GERD (gastroesophageal reflux disease)  Chronic obstructive pulmonary disease (COPD)  Anemia  Falls  Meningitis  Collapsed lung  Alcohol withdrawal  Emphysema of lung  Cirrhosis  CHF (congestive heart failure)  Poor historian  Alcohol abuse  Chronic atrial fibrillation  Unilateral amputation of lower extremity below knee  Presence of Watchman left atrial appendage closure device  S/P BKA (below knee amputation) unilateral, left      Social Hx:  Former smoker. Prior ETOH. Denies drug use       FAMILY HISTORY:  Family history unknown: Patient is Adopted      Allergies  Ceclor (Rash)  Duricef (Rash)      Antibiotics:  None       REVIEW OF SYSTEMS:  CONSTITUTIONAL:  No Fever or chills  HEENT:  No diplopia or blurred vision.  No earache, sore throat or runny nose.  CARDIOVASCULAR:  No pressure, squeezing, strangling, tightness, heaviness or aching about the chest, neck, axilla or epigastrium.  RESPIRATORY:  + chronic dry cough, + shortness of breath  GASTROINTESTINAL:  No nausea, vomiting or diarrhea.  GENITOURINARY:  No dysuria, frequency or urgency.   MUSCULOSKELETAL:  no joint aches, no muscle pain  SKIN:  No change in skin, hair or nails.  NEUROLOGIC:  No Headaches, seizures or weakness.  PSYCHIATRIC:  No disorder of thought or mood.  ENDOCRINE:  No heat or cold intolerance  HEMATOLOGICAL:  No easy bruising or bleeding.       Physical Exam:  Vital Signs Last 24 Hrs  T(C): 36.4 (30 Aug 2019 10:30), Max: 36.6 (30 Aug 2019 05:45)  T(F): 97.5 (30 Aug 2019 10:30), Max: 97.8 (30 Aug 2019 05:45)  HR: 94 (30 Aug 2019 12:34) (81 - 115)  BP: 133/80 (30 Aug 2019 10:30) (121/65 - 136/62)  RR: 18 (30 Aug 2019 10:30) (18 - 18)  SpO2: 97% (30 Aug 2019 12:34) (92% - 97%)      GEN: NAD, pleasant  HEENT: normocephalic and atraumatic. EOMI. PERRL.  Anicteric  NECK: Supple.   LUNGS: Expiratory wheezing  HEART: Regular rate and rhythm  ABDOMEN: Soft, nontender, and Obese.  Positive bowel sounds.    : No CVA tenderness  EXTREMITIES: Without any edema.  MSK: No joint swelling  NEUROLOGIC: No Focal Deficits  PSYCHIATRIC: Appropriate affect .  SKIN: No Rash      Labs:  08-30    137  |  97<L>  |  30.0<H>  ----------------------------<  109  4.3   |  31.0<H>  |  0.95    Ca    8.7      30 Aug 2019 11:03    TPro  6.3<L>  /  Alb  3.4  /  TBili  0.3<L>  /  DBili  x   /  AST  21  /  ALT  31  /  AlkPhos  92  08-30      LIVER FUNCTIONS - ( 30 Aug 2019 11:03 )  Alb: 3.4 g/dL / Pro: 6.3 g/dL / ALK PHOS: 92 U/L / ALT: 31 U/L / AST: 21 U/L / GGT: x             RECENT CULTURES:  08-24 @ 22:14 .Sputum     Few Routine respiratory raymundo present  Few White blood cells  Few Gram Positive Cocci in Pairs and Chains  Few Gram Positive Rods      08-24 @ 04:45 .Urine     <10,000 CFU/ml Gram Positive Cocci in Clusters      08-24 @ 02:13 .Blood     No growth at 5 days.        < from: Xray Chest 2 Views PA/Lat (08.28.19 @ 10:24) >  EXAM:  XR CHEST PA LAT 2V                          PROCEDURE DATE:  08/28/2019      INTERPRETATION:  Chest 2 views    HISTORY: Hypoxia and A. fib    Comparison: 8/24/2019    Frontal and lateral views of the chest were obtained with suboptimal   inspiration. With allowance for this, the heart is similarly enlarged in   size and the lungs show basilar infiltrate with fluid which is seen best   in the lateral examination. There is no evidence of pneumothorax.    IMPRESSION: Basilar infiltrate/fluid.    Thank you for this referral.    < end of copied text >      < from: TTE Echo w/Cont Complete (08.26.19 @ 09:09) >  Summary:   1. Left ventricular ejection fraction, by visual estimation, is 60 to 65%.   2. Normal global left ventricular systolic function.   3. The mitral in-flow pattern reveals no discernable A-wave, therefore   no comment on diastolic function can be made.   4. Mild mitral annular calcification.   5. Mild thickening of the anterior and posterior mitral valve leaflets.   6. The tricuspid valve was poorly seen. In the subcostal view it   apepared nl. No gross TR was noted.   7. Dilatation of the ascending aorta.   8. Estimated pulmonary artery systolic pressure is 57.8 mmHg assuming a   right atrial pressure of 15 mmHg, which is consistent with moderate   pulmonary hypertension.   9. Pulmonary hypertension is present.  < end of copied text >
[place] [facility]                                                        Office: [address] [address]                                                       Telephone: [phone]. Fax:[phone]                                                              CARDIOLOGY CONSULTATION NOTE                                                                                             Consult requested by:  Jethro Rosado MD (Hospitalist)     Reason for Consultation: dyspnea, .and atrial fibrillation     History obtained by: Patient and medical record     obtained: No    Chief complaint:    Patient is a 64y old  Male who presents with a chief complaint of shortness of breath (24 Aug 2019 15:53)      HPI:  64 year old male with PMH of afib s/p watchman device , history of alcoholism in past , now sober , h/o tobacco abuse stopped many years ago , COPD , hx of BKA due to injury , diastolic CHF presented to hospital with c/o SOB that started suddenly progressively got worse , he felt chills , night sweats , no fever at home but was febrile in ED , code sepsis called , cultures sent , started on antibiotics .  at the time of my evaluation patient was resting in bed , speaking in full sentences , denies sick contacts , no palpitations . no significant cough , no phlegm production .  no nausea vomiting , no diarrhea , constipation.   CT chest negative for PE . showed pulmonary fibrosis , pulmonary HTN (24 Aug 2019 15:53)    Above history reviewed.   This is a64 year old male with history of smoking, COPD, left BKA, alcohol abuse, diastolic CHF, afib s/p watchman due to multiple falls here with fdyspnea  States he has Patient complains of progressively worsening shortness of breathe, increase weight gain, orthopnea  . he sits up to sleep.  Cannot lay flat. No significant LE edema.  no cough. no fevers. no phlegm.     REVIEW OF SYMPTOMS: Cardiovascular:  See HPI. No chest pain +  dyspnea,  No syncope,  No palpitations, No dizziness, +  Orthopnea,      No Paroxsymal nocturnal dyspnea;  Respiratory:  +  Dyspnea, No cough,     Genitourinary:  No dysuria, no hematuria; Gastrointestinal:  No nausea, no vomiting. No diarrhea.  No abdominal pain. No dark color stool, no melena ; Neurological: No headache, no dizziness, no slurred speech;  Psychiatric: No agitation, no anxiety.  ALL OTHER REVIEW OF SYSTEMS ARE NEGATIVE.    ALLERGIES: Allergies    Ceclor (Rash)  Duricef (Rash)    Intolerances          CURRENT MEDICATIONS:  diltiazem    Tablet 90 milliGRAM(s) Oral three times a day  furosemide    Tablet 40 milliGRAM(s) Oral daily  tamsulosin Oral Tab/Cap - Peds 0.4 milliGRAM(s) Oral at bedtime    ALBUTerol/ipratropium for Nebulization   gabapentin  pantoprazole   Suspension  clopidogrel Tablet  enoxaparin Injectable  ferrous sulfate Oral Tab/Cap - Peds  methylPREDNISolone sodium succinate Injectable      HOME MEDICATIONS:    PAST MEDICAL HISTORY  Chronic CHF  COPD without exacerbation  Atrial fibrillation  Presence of Watchman left atrial appendage closure device  Hypertension  GERD (gastroesophageal reflux disease)  Chronic obstructive pulmonary disease (COPD)  Anemia  Falls  Meningitis  Collapsed lung  Alcohol withdrawal  Emphysema of lung  ETOH abuse  Cirrhosis  Afib  CHF (congestive heart failure)  Poor historian  Alcohol abuse  Chronic atrial fibrillation      PAST SURGICAL HISTORY  Unilateral amputation of lower extremity below knee  Presence of Watchman left atrial appendage closure device  S/P BKA (below knee amputation), left  Poor historian  S/P BKA (below knee amputation) unilateral, left      FAMILY HISTORY:  No pertinent family history in first degree relatives  Family history unknown: Adopted      SOCIAL HISTORY:  Denies smoking/alcohol/drugs      Vital Signs Last 24 Hrs  T(C): 36.4 (25 Aug 2019 08:52), Max: 36.5 (24 Aug 2019 20:10)  T(F): 97.6 (25 Aug 2019 08:52), Max: 97.7 (24 Aug 2019 20:10)  HR: 100 (25 Aug 2019 08:51) (80 - 110)  BP: 125/72 (25 Aug 2019 08:52) (120/77 - 148/72)  BP(mean): --  RR: 17 (25 Aug 2019 08:52) (17 - 20)  SpO2: 99% (25 Aug 2019 08:52) (96% - 99%)      PHYSICAL EXAM:  Constitutional: Comfortable . No acute distress.   HEENT: Atraumatic and normcephalic , neck is supple . no JVD. No carotid bruit. PEERL   CNS: A&Ox3. No focal deficits. EOMI.   Lymph Nodes: Cervical : Not palpable.  Respiratory:  bialteral basal crepts.   Cardiovascular: S1S2 RRR. No murmur/rubs or gallop.  Gastrointestinal: Soft non-tender and non distended . +Bowel sounds. negative Milligan's sign.  Extremities: N1 + edema right leg. Left leg BKA.   Psychiatric: Calm . no agitation.  Skin: No skin rash/ulcers visualized to face, hands or feet.    Intake and output:     LABS:                        9.5    8.64  )-----------( 150      ( 24 Aug 2019 02:11 )             31.9         138  |  98  |  13.0  ----------------------------<  121<H>  3.7   |  27.0  |  0.65    Ca    9.1      24 Aug 2019 02:11    TPro  7.5  /  Alb  3.8  /  TBili  1.1  /  DBili  x   /  AST  16  /  ALT  7   /  AlkPhos  115      CARDIAC MARKERS ( 24 Aug 2019 05:06 )  x     / <0.01 ng/mL / x     / x     / x        ;p-BNP=Serum Pro-Brain Natriuretic Peptide: 3649 pg/mL ( @ 10:52)    PT/INR - ( 24 Aug 2019 02:11 )   PT: 17.4 sec;   INR: 1.49 ratio         PTT - ( 24 Aug 2019 02:11 )  PTT:32.4 sec  Urinalysis Basic - ( 24 Aug 2019 04:44 )    Color: Yellow / Appearance: Clear / S.005 / pH: x  Gluc: x / Ketone: Negative  / Bili: Negative / Urobili: Negative mg/dL   Blood: x / Protein: 30 mg/dL / Nitrite: Negative   Leuk Esterase: Negative / RBC: Negative /HPF / WBC 0-2   Sq Epi: x / Non Sq Epi: Negative / Bacteria: Negative        INTERPRETATION OF TELEMETRY: Reviewed by me.   ECG: Reviewed by me. < from: 12 Lead ECG (19 @ 01:41) >  Diagnosis Line Atrial fibrillation with rapid ventricular response  Rightwardaxis  Anteroseptal infarct , age undetermined    < end of copied text >      RADIOLOGY & ADDITIONAL STUDIES:    X-ray:  reviewed by me.   CT scan: < from: CT Chest No Cont (19 @ 06:33) >  IMPRESSION:   -Diffuse peripheral reticular opacities, which likely reflect pulmonary   interstitial fibrosis. Right pleural thickening and/or subsegmental   atelectasis, difficult to distinguish due to motion. No lobar   consolidation.    -Enlarged main pulmonary artery measuring 5.2 cm, indicative of pulmonary   hypertension.    -Nonspecific mediastinal lymphadenopathy measuring up to 1.6 cm.    -1.4 cm hypodense right thyroid lesion, for which nonemergent thyroid   ultrasound recommended for further characterization.    -Nodular hepatic contour, suspicious for cirrhosis.    < end of copied text >    MRI:   ECHO : 2019: Moderate MR., moderate TR. normal PALP. LVEF normal/
PULMONARY CONSULT NOTE      STONE DIAZ  MRN-[id number]    Patient is a 64y old  Male who presents with a chief complaint of shortness of breath (01 Sep 2019 10:16)      HISTORY OF PRESENT ILLNESS:    64 year old male with PMH of afib s/p watchman device, history of alcoholism in past with liver cirrhosis, now sober, h/o tobacco abuse stopped many years ago, COPD, hx of BKA due to injury, diastolic CHF presented to hospital with c/o SOB that started suddenly progressively got worse, he felt chills, night sweats, no fever at home but was febrile in ED, code sepsis called, cultures sent and was started on antibiotics. He is followed by Dr. Coats of pul for moderate to severe COPD and is maintained on Spiriva as an outpt per office notes. He has an FEV1 of 1.59 (41%), FVC of 2.59 (51%), and FEV1/FVC of 61%. On admission, CT chest was negative for PE but with pulmonary fibrosis and pulmonary HTN. He is an ex-smoker of 1ppd x 40 years, quit one year ago. He now feels much better but not quite back to baseline. He reportedly also desaturates with sleep, suspicious for VIJAY. He reports a prior h/o VIJAY.       MEDICATIONS  (STANDING):  ALBUTerol/ipratropium for Nebulization 3 milliLiter(s) Nebulizer every 4 hours  clopidogrel Tablet 75 milliGRAM(s) Oral daily  diltiazem    Tablet 90 milliGRAM(s) Oral three times a day  enoxaparin Injectable 40 milliGRAM(s) SubCutaneous daily  ferrous    sulfate 325 milliGRAM(s) Oral two times a day  ferrous sulfate Oral Tab/Cap - Peds 325 milliGRAM(s) Oral daily  furosemide    Tablet 40 milliGRAM(s) Oral daily  furosemide   Injectable 20 milliGRAM(s) IV Push once  gabapentin 400 milliGRAM(s) Oral three times a day  magnesium oxide 400 milliGRAM(s) Oral three times a day with meals  pantoprazole   Suspension 40 milliGRAM(s) Oral daily  predniSONE   Tablet 40 milliGRAM(s) Oral daily  tamsulosin Oral Tab/Cap - Peds 0.4 milliGRAM(s) Oral at bedtime      MEDICATIONS  (PRN):  acetaminophen   Tablet .. 650 milliGRAM(s) Oral every 6 hours PRN Temp greater or equal to 38C (100.4F), Mild Pain (1 - 3)  ALPRAZolam 0.5 milliGRAM(s) Oral every 12 hours PRN anxiety      Allergies    Ceclor (Rash)  Duricef (Rash)    Intolerances        PAST MEDICAL & SURGICAL HISTORY:  Chronic CHF  COPD without exacerbation  Atrial fibrillation  Presence of Watchman left atrial appendage closure device  Hypertension  GERD (gastroesophageal reflux disease)  Chronic obstructive pulmonary disease (COPD)  Anemia  Falls  Meningitis  Collapsed lung  Alcohol withdrawal  Emphysema of lung  Cirrhosis  CHF (congestive heart failure)  Poor historian  Alcohol abuse  Chronic atrial fibrillation  Unilateral amputation of lower extremity below knee  Presence of Watchman left atrial appendage closure device  S/P BKA (below knee amputation) unilateral, left      FAMILY HISTORY:  No pertinent family history in first degree relatives  Family history unknown: Adopted      SOCIAL HISTORY  Smoking History: as above    REVIEW OF SYSTEMS:    CONSTITUTIONAL:  No fevers, chills, sweats    HEENT:  Eyes:  No diplopia or blurred vision. ENT:  No earache, sore throat or runny nose.    CARDIOVASCULAR:  No pressure, squeezing, tightness, or heaviness about the chest; no palpitations.    RESPIRATORY:  Per HPI    GASTROINTESTINAL:  No abdominal pain, nausea, vomiting or diarrhea.    GENITOURINARY:  No dysuria, frequency or urgency.    NEUROLOGIC:  No paresthesias, fasciculations, seizures or weakness.    PSYCHIATRIC:  No disorder of thought or mood.    Vital Signs Last 24 Hrs  T(C): 36.4 (01 Sep 2019 11:06), Max: 36.6 (31 Aug 2019 21:45)  T(F): 97.5 (01 Sep 2019 11:06), Max: 97.8 (31 Aug 2019 21:45)  HR: 88 (01 Sep 2019 08:31) (80 - 99)  BP: 136/68 (01 Sep 2019 11:06) (130/69 - 150/78)  BP(mean): --  RR: 18 (01 Sep 2019 11:06) (18 - 20)  SpO2: 96% (01 Sep 2019 11:06) (94% - 100%)    PHYSICAL EXAMINATION:    GENERAL: The patient is a well-developed, well-nourished obese male in no apparent distress.     HEENT: Head is normocephalic and atraumatic. Extraocular muscles are intact. Mucous membranes are moist.     NECK: Supple.     LUNGS: Clear to auscultation without wheezing, rales, or rhonchi. Respirations unlabored    HEART: Irregular rate and rhythm without murmur.    ABDOMEN: Soft, nontender, and nondistended.  No hepatosplenomegaly is noted.    EXTREMITIES: Without any cyanosis, clubbing, rash, lesions or edema. + L BKA    NEUROLOGIC: Grossly intact.      LABS:                        10.7   9.54  )-----------( 213      ( 01 Sep 2019 05:41 )             36.1     09-01    137  |  96<L>  |  25.0<H>  ----------------------------<  158<H>  4.4   |  31.0<H>  |  0.74    Ca    8.2<L>      01 Sep 2019 05:41          MICROBIOLOGY:    Culture - Sputum (08.24.19 @ 22:14)    Gram Stain:   Few White blood cells  Few Gram Positive Cocci in Pairs and Chains  Few Gram Positive Rods    Specimen Source: .Sputum    Culture Results:   Few Routine respiratory raymundo present        RADIOLOGY & ADDITIONAL STUDIES:       EXAM:  XR CHEST PA LAT 2V                          PROCEDURE DATE:  08/28/2019          INTERPRETATION:  Chest 2 views    HISTORY: Hypoxia and A. fib    Comparison: 8/24/2019    Frontal and lateral views of the chest were obtained with suboptimal   inspiration. With allowance for this, the heart is similarly enlarged in   size and the lungs show basilar infiltrate with fluid which is seen best   in the lateral examination. There is no evidence of pneumothorax.    IMPRESSION: Basilar infiltrate/fluid.    Thank you for this referral.          FELICIA QUIROZ M.D., ATTENDING RADIOLOGIST  This document has been electronically signed. Aug 28 2019  1:39PM           EXAM:  CT CHEST                          PROCEDURE DATE:  08/24/2019          INTERPRETATION:  CLINICAL INFORMATION: Cough and fever.    COMPARISON: None.    TECHNIQUE: Noncontrast CT scan of the chest was performed from the   thoracic inlet to the adrenal glands with coronal and sagittal reformats.     COMPARISON: CT pulmonary angiogram 11/3/2017.    FINDINGS:   Evaluation of the mediastinum, pleura and soft tissues is limited without   intravenous contrast.    Lungs and airways: The trachea and main bronchi are patent. Diffuse   peripheral reticular opacities which likely reflect interstitial   fibrosis. Respiratory motion limits evaluation of the lower lungs. No   lobar consolidation. No pneumothorax. Right pleural thickening and/or   subsegmental atelectasis, difficult to distinguish due to motion.     Heart and vessels: Multichamber cardiomegaly. Mitral valve replacement.   Coronary artery and aortic root calcifications. Normal caliber thoracic   aorta. Enlarged main pulmonary artery measuring 5.2 cm, indicative of   pulmonary hypertension.    Mediastinum and soft tissues: 1.4 cm hypodense right thyroid lesion. No   axillary lymphadenopathy. Shotty mediastinal and bilateral hilar   lymphadenopathy with prevascular lymph node measuring up to 1.2 cm and   right lower paratracheal lymph node containing coarse calcification,   measuring up to 1.6 cm.     Upper abdomen: Nodular hepatic contour, suspicious for cirrhosis. Splenic   calcified granulomas, likely from old granulomatous infection or   inflammation.     Bones: Old nonunited left posterior 10th and 11th rib fractures. Old left   posterior 12th rib fracture. Old right lateral 9th and 10th rib   fractures. Generalized osteopenia. Multilevel Schmorl's nodes and   degenerative disc disease, most pronounced in the mid thoracic spine.    IMPRESSION:   -Diffuse peripheral reticular opacities, which likely reflect pulmonary   interstitial fibrosis. Right pleural thickening and/or subsegmental   atelectasis, difficult to distinguish due to motion. No lobar   consolidation.    -Enlarged main pulmonary artery measuring 5.2 cm, indicative of pulmonary   hypertension.    -Nonspecific mediastinal lymphadenopathy measuring up to 1.6 cm.    -1.4 cm hypodense right thyroid lesion, for which nonemergent thyroid   ultrasound recommended for further characterization.    -Nodular hepatic contour, suspicious for cirrhosis.          SARAHY ABREU M.D., ATTENDING RADIOLOGIST  This document has been electronically signed. Aug 24 2019  7:27AM            ECHO:      Summary:   1. Left ventricular ejection fraction, by visual estimation, is 60 to   65%.   2. Normal global left ventricular systolic function.   3. The mitral in-flow pattern reveals no discernable A-wave, therefore   no comment on diastolic function can be made.   4. Mild mitral annular calcification.   5. Mild thickening of the anterior and posterior mitral valve leaflets.   6. The tricuspid valve was poorly seen. In the subcostal view it   apepared nl. No gross TR was noted.   7. Dilatation of the ascending aorta.   8. Estimated pulmonary artery systolic pressure is 57.8 mmHg assuming a   right atrial pressure of 15 mmHg, which is consistent with moderate   pulmonary hypertension.   9. Pulmonary hypertension is present.    MD Geri Electronically signed on 8/26/2019 at 10:56:00 AM

## 2019-09-01 NOTE — CONSULT NOTE ADULT - ASSESSMENT
63y/o  Male with h/o of afib s/p watchman device , history of alcoholism in past and now sober, Former smoker, COPD, s/p Left BKA due to injury, chronic diastolic CHF. Patient presented to the ER with c/o SOB that started suddenly progressively got worse. In the ER patient was febrile to 101.2F, no leukocytosis. CT chest negative for PE and showed pulmonary fibrosis , pulmonary HTN. Patient was on Levaquin initially but was stopped once CT Chest reported no pneumonia. Patient was seen by cardiology and had a stress test which was negative. No fevers rest of the hospitalization. No leukocytosis. Patient remains SOB and hypoxic.      Acute hypoxic respiratory failure  r/o Pneumonia  Pulm HTN  Pulm fibrosis  COPD  Former smoker      - Blood cultures no growth  - Sputum cultures  with normal raymundo  - Urine for legionella negative  - CXR reviewed, no pneumonia noted  - Monitor off antibiotics  - Follow up cardiology and pulmonary   - Trend Fever  - Trend WBC      Will Sign off. please call PRN.
Moderate to severe COPD on outpt spirometry  Pulm HTN on echo  Peripheral fibrotic changes on chest CT  Reported VIJAY with desaturation with sleep  Obese  AF  Former smoker  H/o alcohol abuse    Rec:    Drug nebs - consider Anoro or equivalent as outpt  Prednisone taper  On Lovenox/PPI  Optimize cardiac function  Rate control  PSG as outpt  Pulm f/u with Dr. Coats after d/c    D/w Dr. Rogel
his is a64 year old male with history of smoking, COPD, left BKA, alcohol abuse, diastolic CHF, afib s/p watchman due to multiple falls here with fdyspnea

## 2019-09-01 NOTE — PROGRESS NOTE ADULT - SUBJECTIVE AND OBJECTIVE BOX
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  Admitted for hypoxic respiratory failure due to CHF / COPD / Pulmonary Fibrosis.  He also recalls h/o VIJAY, but not on CPAP due to claustrophobia.      Interval History:  Reports difficulty ambulating due to mild edema at L stump.  No pain / trauma.  Denies SOB.  SaO2 93% on RA, noted 84% while sleeping per RN.      ____________________PHYSICAL EXAM:  Vitals reviewed as indicated below  GENERAL:  NAD Alert and Oriented x 3   HEENT: NCAT  CARDIOVASCULAR:  S1, S2  LUNGS: coarse BS b/l  ABDOMEN:  soft, (-) tenderness, (-) distension, (+) bowel sounds, (-) guarding, (-) rebound (-) rigidity  EXTREMITIES:  no cyanosis / clubbing.  Mild edema.  L BKA.    ____________________     VITALS:  Vital Signs Last 24 Hrs  T(C): 36.6 (01 Sep 2019 05:34), Max: 36.6 (31 Aug 2019 21:45)  T(F): 97.8 (01 Sep 2019 05:34), Max: 97.8 (31 Aug 2019 21:45)  HR: 88 (01 Sep 2019 08:31) (75 - 99)  BP: 138/78 (01 Sep 2019 05:34) (130/69 - 150/78)  BP(mean): --  RR: 18 (01 Sep 2019 05:34) (14 - 20)  SpO2: 94% (01 Sep 2019 08:31) (84% - 100%) Daily     Daily Weight in k.3 (01 Sep 2019 05:30)  CAPILLARY BLOOD GLUCOSE        I&O's Summary    31 Aug 2019 07:01  -  01 Sep 2019 07:00  --------------------------------------------------------  IN: 360 mL / OUT: 2850 mL / NET: -2490 mL    01 Sep 2019 07:01  -  01 Sep 2019 10:21  --------------------------------------------------------  IN: 0 mL / OUT: 1000 mL / NET: -1000 mL        LABS:                        10.7   9.54  )-----------( 213      ( 01 Sep 2019 05:41 )             36.1         137  |  96<L>  |  25.0<H>  ----------------------------<  158<H>  4.4   |  31.0<H>  |  0.74    Ca    8.2<L>      01 Sep 2019 05:41    TPro  6.3<L>  /  Alb  3.4  /  TBili  0.3<L>  /  DBili  x   /  AST  21  /  ALT  31  /  AlkPhos  92  08-30      LIVER FUNCTIONS - ( 30 Aug 2019 11:03 )  Alb: 3.4 g/dL / Pro: 6.3 g/dL / ALK PHOS: 92 U/L / ALT: 31 U/L / AST: 21 U/L / GGT: x                     MEDICATIONS:  acetaminophen   Tablet .. 650 milliGRAM(s) Oral every 6 hours PRN  ALBUTerol/ipratropium for Nebulization 3 milliLiter(s) Nebulizer every 4 hours  ALPRAZolam 0.5 milliGRAM(s) Oral every 12 hours PRN  clopidogrel Tablet 75 milliGRAM(s) Oral daily  diltiazem    Tablet 90 milliGRAM(s) Oral three times a day  enoxaparin Injectable 40 milliGRAM(s) SubCutaneous daily  ferrous    sulfate 325 milliGRAM(s) Oral two times a day  ferrous sulfate Oral Tab/Cap - Peds 325 milliGRAM(s) Oral daily  furosemide    Tablet 40 milliGRAM(s) Oral daily  gabapentin 400 milliGRAM(s) Oral three times a day  magnesium oxide 400 milliGRAM(s) Oral three times a day with meals  pantoprazole   Suspension 40 milliGRAM(s) Oral daily  predniSONE   Tablet 40 milliGRAM(s) Oral daily  tamsulosin Oral Tab/Cap - Peds 0.4 milliGRAM(s) Oral at bedtime      < from: CT Chest No Cont (19 @ 06:33) >  IMPRESSION:   -Diffuse peripheral reticular opacities, which likely reflect pulmonary   interstitial fibrosis. Right pleural thickening and/or subsegmental   atelectasis, difficult to distinguish due to motion. No lobar   consolidation.    -Enlarged main pulmonary artery measuring 5.2 cm, indicative of pulmonary   hypertension.    -Nonspecific mediastinal lymphadenopathy measuring up to 1.6 cm.    -1.4 cm hypodense right thyroid lesion, for which nonemergent thyroid   ultrasound recommended for further characterization.    -Nodular hepatic contour, suspicious for cirrhosis.    < end of copied text >

## 2019-09-02 PROCEDURE — 99232 SBSQ HOSP IP/OBS MODERATE 35: CPT

## 2019-09-02 RX ADMIN — ENOXAPARIN SODIUM 40 MILLIGRAM(S): 100 INJECTION SUBCUTANEOUS at 21:44

## 2019-09-02 RX ADMIN — Medication 40 MILLIGRAM(S): at 06:37

## 2019-09-02 RX ADMIN — GABAPENTIN 400 MILLIGRAM(S): 400 CAPSULE ORAL at 06:37

## 2019-09-02 RX ADMIN — CLOPIDOGREL BISULFATE 75 MILLIGRAM(S): 75 TABLET, FILM COATED ORAL at 14:37

## 2019-09-02 RX ADMIN — GABAPENTIN 400 MILLIGRAM(S): 400 CAPSULE ORAL at 21:43

## 2019-09-02 RX ADMIN — Medication 90 MILLIGRAM(S): at 21:44

## 2019-09-02 RX ADMIN — Medication 325 MILLIGRAM(S): at 17:34

## 2019-09-02 RX ADMIN — Medication 650 MILLIGRAM(S): at 21:43

## 2019-09-02 RX ADMIN — Medication 325 MILLIGRAM(S): at 06:37

## 2019-09-02 RX ADMIN — TAMSULOSIN HYDROCHLORIDE 0.4 MILLIGRAM(S): 0.4 CAPSULE ORAL at 21:44

## 2019-09-02 RX ADMIN — GABAPENTIN 400 MILLIGRAM(S): 400 CAPSULE ORAL at 14:37

## 2019-09-02 RX ADMIN — PANTOPRAZOLE SODIUM 40 MILLIGRAM(S): 20 TABLET, DELAYED RELEASE ORAL at 14:37

## 2019-09-02 RX ADMIN — Medication 90 MILLIGRAM(S): at 06:37

## 2019-09-02 RX ADMIN — Medication 90 MILLIGRAM(S): at 14:37

## 2019-09-02 NOTE — PHYSICAL THERAPY INITIAL EVALUATION ADULT - PERTINENT HX OF CURRENT PROBLEM, REHAB EVAL
Pt is a 65 yo male with PMH of afib s/p watchman device admitted for hypoxic respiratory failure due to CHF/COPD/pulmonary fibrosis

## 2019-09-02 NOTE — PROGRESS NOTE ADULT - SUBJECTIVE AND OBJECTIVE BOX
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  Admitted for hypoxic respiratory failure due to CHF / COPD / Pulmonary Fibrosis.  He also recalls h/o VIJAY, but not on CPAP due to claustrophobia.      Interval History:  Continued swelling at L Stump.  No erythema / pain.  Denies SOB.  SaO2 93% on RA, noted 84% while sleeping per RN.      ____________________PHYSICAL EXAM:  Vitals reviewed as indicated below  GENERAL:  NAD Alert and Oriented x 3   HEENT: NCAT  CARDIOVASCULAR:  S1, S2  LUNGS: coarse BS b/l  ABDOMEN:  soft, (-) tenderness, (-) distension, (+) bowel sounds, (-) guarding, (-) rebound (-) rigidity  EXTREMITIES:  no cyanosis / clubbing.  Mild edema.  L BKA.    ____________________     VITALS:  Vital Signs Last 24 Hrs  T(C): 36.5 (02 Sep 2019 10:51), Max: 36.7 (01 Sep 2019 15:24)  T(F): 97.7 (02 Sep 2019 10:51), Max: 98 (01 Sep 2019 15:24)  HR: 88 (02 Sep 2019 10:51) (87 - 104)  BP: 107/66 (02 Sep 2019 10:51) (107/66 - 152/86)  BP(mean): --  RR: 18 (02 Sep 2019 10:51) (18 - 18)  SpO2: 93% (02 Sep 2019 10:51) (93% - 100%) Daily     Daily Weight in k.2 (02 Sep 2019 04:12)  CAPILLARY BLOOD GLUCOSE        I&O's Summary    01 Sep 2019 07:01  -  02 Sep 2019 07:00  --------------------------------------------------------  IN: 4320 mL / OUT: 4850 mL / NET: -530 mL    02 Sep 2019 07:01  -  02 Sep 2019 12:41  --------------------------------------------------------  IN: 0 mL / OUT: 950 mL / NET: -950 mL        LABS:                        10.7   9.54  )-----------( 213      ( 01 Sep 2019 05:41 )             36.1     09-01    137  |  96<L>  |  25.0<H>  ----------------------------<  158<H>  4.4   |  31.0<H>  |  0.74    Ca    8.2<L>      01 Sep 2019 05:41                    MEDICATIONS:  acetaminophen   Tablet .. 650 milliGRAM(s) Oral every 6 hours PRN  ALBUTerol/ipratropium for Nebulization 3 milliLiter(s) Nebulizer every 6 hours PRN  ALPRAZolam 0.5 milliGRAM(s) Oral every 12 hours PRN  clopidogrel Tablet 75 milliGRAM(s) Oral daily  diltiazem    Tablet 90 milliGRAM(s) Oral three times a day  enoxaparin Injectable 40 milliGRAM(s) SubCutaneous daily  ferrous    sulfate 325 milliGRAM(s) Oral two times a day  ferrous sulfate Oral Tab/Cap - Peds 325 milliGRAM(s) Oral daily  furosemide    Tablet 40 milliGRAM(s) Oral daily  gabapentin 400 milliGRAM(s) Oral three times a day  pantoprazole   Suspension 40 milliGRAM(s) Oral daily  predniSONE   Tablet 40 milliGRAM(s) Oral daily  tamsulosin Oral Tab/Cap - Peds 0.4 milliGRAM(s) Oral at bedtime

## 2019-09-02 NOTE — PHYSICAL THERAPY INITIAL EVALUATION ADULT - DISCHARGE DISPOSITION, PT EVAL
pt refused functional evaluation at this time reporting his prosthesis doesn't fit due to his swelling, PT department to reattempt function evaluation for discharge recommendations

## 2019-09-02 NOTE — PHYSICAL THERAPY INITIAL EVALUATION ADULT - ADDITIONAL COMMENTS
Pt lives a long in a single level home. No stairs to enter and no stairs inside. Pt has a RW but primarily utilized a SAC prior to admission.

## 2019-09-02 NOTE — PHYSICAL THERAPY INITIAL EVALUATION ADULT - LEVEL OF INDEPENDENCE: SUPINE/SIT, REHAB EVAL
pt refused functional assessment at this time, reporting he is too tired and needs to go back to sleep

## 2019-09-02 NOTE — PROGRESS NOTE ADULT - ASSESSMENT
1. Acute hypoxic respiratory failure likely due to combination of ADHF HFpEF + pulmonary fibrosis.  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  No evidence of PNA on CT chest, sepsis ruled out.  Taper IV Steroids.  Will likely need outpatient PFTs, Sleep study.  Pulmonary evaluation requested.     1b. VIJAY - discussed reevaluation as outpatient and refitting for CPAP device.  Pt acknowledged understanding.      2. Afib with RVR - rate controlled, s/p Watchman off AC.  Outpatient cardiology followup discussed.   3. Anemia - Monitor CBC.  On Fe supplementation. CBC stable.   4. Hx of ETOH abuse with cirrhosis - -> stable  5. Right thyroid 1.4 cm lesion, mediastinal lymphadenopathy - Discussed possibilities including malignancy.  outpt workup including U/S.  He agreed and acknowledged understanding.  6. H/o BKA - PT followup.  Ordered ACE wrap to assist with fitment of stump into prosthetic.   > DVT Prophylaxis - Lovenox subcut    Probable d/c in am pending fitment of prosthetic.

## 2019-09-02 NOTE — PHYSICAL THERAPY INITIAL EVALUATION ADULT - GENERAL OBSERVATIONS, REHAB EVAL
Pt received supine in bed sleeping on 4 CTU +telemonitor, easily awoken and agreeable to PT evaluation.

## 2019-09-03 ENCOUNTER — TRANSCRIPTION ENCOUNTER (OUTPATIENT)
Age: 64
End: 2019-09-03

## 2019-09-03 VITALS
HEART RATE: 92 BPM | OXYGEN SATURATION: 94 % | DIASTOLIC BLOOD PRESSURE: 68 MMHG | SYSTOLIC BLOOD PRESSURE: 148 MMHG | RESPIRATION RATE: 18 BRPM | TEMPERATURE: 98 F

## 2019-09-03 DIAGNOSIS — I48.91 UNSPECIFIED ATRIAL FIBRILLATION: ICD-10-CM

## 2019-09-03 DIAGNOSIS — G47.33 OBSTRUCTIVE SLEEP APNEA (ADULT) (PEDIATRIC): ICD-10-CM

## 2019-09-03 DIAGNOSIS — Z95.818 PRESENCE OF OTHER CARDIAC IMPLANTS AND GRAFTS: ICD-10-CM

## 2019-09-03 PROCEDURE — 83540 ASSAY OF IRON: CPT

## 2019-09-03 PROCEDURE — 83880 ASSAY OF NATRIURETIC PEPTIDE: CPT

## 2019-09-03 PROCEDURE — 82962 GLUCOSE BLOOD TEST: CPT

## 2019-09-03 PROCEDURE — 71046 X-RAY EXAM CHEST 2 VIEWS: CPT

## 2019-09-03 PROCEDURE — 80053 COMPREHEN METABOLIC PANEL: CPT

## 2019-09-03 PROCEDURE — 96365 THER/PROPH/DIAG IV INF INIT: CPT

## 2019-09-03 PROCEDURE — 86317 IMMUNOASSAY INFECTIOUS AGENT: CPT

## 2019-09-03 PROCEDURE — 99239 HOSP IP/OBS DSCHRG MGMT >30: CPT

## 2019-09-03 PROCEDURE — 86581 STRPTCS PNEUM ANTB SEROT IA: CPT

## 2019-09-03 PROCEDURE — 36415 COLL VENOUS BLD VENIPUNCTURE: CPT

## 2019-09-03 PROCEDURE — 84484 ASSAY OF TROPONIN QUANT: CPT

## 2019-09-03 PROCEDURE — 99233 SBSQ HOSP IP/OBS HIGH 50: CPT

## 2019-09-03 PROCEDURE — 83605 ASSAY OF LACTIC ACID: CPT

## 2019-09-03 PROCEDURE — 96366 THER/PROPH/DIAG IV INF ADDON: CPT

## 2019-09-03 PROCEDURE — 81001 URINALYSIS AUTO W/SCOPE: CPT

## 2019-09-03 PROCEDURE — 97112 NEUROMUSCULAR REEDUCATION: CPT

## 2019-09-03 PROCEDURE — 87070 CULTURE OTHR SPECIMN AEROBIC: CPT

## 2019-09-03 PROCEDURE — 83735 ASSAY OF MAGNESIUM: CPT

## 2019-09-03 PROCEDURE — 85027 COMPLETE CBC AUTOMATED: CPT

## 2019-09-03 PROCEDURE — 80061 LIPID PANEL: CPT

## 2019-09-03 PROCEDURE — 94640 AIRWAY INHALATION TREATMENT: CPT

## 2019-09-03 PROCEDURE — 87086 URINE CULTURE/COLONY COUNT: CPT

## 2019-09-03 PROCEDURE — 84466 ASSAY OF TRANSFERRIN: CPT

## 2019-09-03 PROCEDURE — 93017 CV STRESS TEST TRACING ONLY: CPT

## 2019-09-03 PROCEDURE — 71250 CT THORAX DX C-: CPT

## 2019-09-03 PROCEDURE — 94760 N-INVAS EAR/PLS OXIMETRY 1: CPT

## 2019-09-03 PROCEDURE — 96375 TX/PRO/DX INJ NEW DRUG ADDON: CPT

## 2019-09-03 PROCEDURE — C8929: CPT

## 2019-09-03 PROCEDURE — 99285 EMERGENCY DEPT VISIT HI MDM: CPT | Mod: 25

## 2019-09-03 PROCEDURE — 97530 THERAPEUTIC ACTIVITIES: CPT

## 2019-09-03 PROCEDURE — 85730 THROMBOPLASTIN TIME PARTIAL: CPT

## 2019-09-03 PROCEDURE — A9500: CPT

## 2019-09-03 PROCEDURE — 82728 ASSAY OF FERRITIN: CPT

## 2019-09-03 PROCEDURE — 83036 HEMOGLOBIN GLYCOSYLATED A1C: CPT

## 2019-09-03 PROCEDURE — 71045 X-RAY EXAM CHEST 1 VIEW: CPT

## 2019-09-03 PROCEDURE — 83550 IRON BINDING TEST: CPT

## 2019-09-03 PROCEDURE — 80048 BASIC METABOLIC PNL TOTAL CA: CPT

## 2019-09-03 PROCEDURE — 97163 PT EVAL HIGH COMPLEX 45 MIN: CPT

## 2019-09-03 PROCEDURE — 78452 HT MUSCLE IMAGE SPECT MULT: CPT

## 2019-09-03 PROCEDURE — 93005 ELECTROCARDIOGRAM TRACING: CPT

## 2019-09-03 PROCEDURE — 84443 ASSAY THYROID STIM HORMONE: CPT

## 2019-09-03 PROCEDURE — 87040 BLOOD CULTURE FOR BACTERIA: CPT

## 2019-09-03 PROCEDURE — 87449 NOS EACH ORGANISM AG IA: CPT

## 2019-09-03 PROCEDURE — 99232 SBSQ HOSP IP/OBS MODERATE 35: CPT

## 2019-09-03 PROCEDURE — 85610 PROTHROMBIN TIME: CPT

## 2019-09-03 RX ORDER — ACETAZOLAMIDE 250 MG/1
1 TABLET ORAL
Qty: 30 | Refills: 3
Start: 2019-09-03

## 2019-09-03 RX ORDER — ACETAZOLAMIDE 250 MG/1
1 TABLET ORAL
Qty: 0 | Refills: 0 | DISCHARGE
Start: 2019-09-03

## 2019-09-03 RX ORDER — ACETAZOLAMIDE 250 MG/1
125 TABLET ORAL DAILY
Refills: 0 | Status: DISCONTINUED | OUTPATIENT
Start: 2019-09-03 | End: 2019-09-03

## 2019-09-03 RX ORDER — NICOTINE POLACRILEX 2 MG
1 GUM BUCCAL
Qty: 30 | Refills: 0
Start: 2019-09-03

## 2019-09-03 RX ADMIN — CLOPIDOGREL BISULFATE 75 MILLIGRAM(S): 75 TABLET, FILM COATED ORAL at 12:02

## 2019-09-03 RX ADMIN — Medication 325 MILLIGRAM(S): at 06:44

## 2019-09-03 RX ADMIN — GABAPENTIN 400 MILLIGRAM(S): 400 CAPSULE ORAL at 06:44

## 2019-09-03 RX ADMIN — GABAPENTIN 400 MILLIGRAM(S): 400 CAPSULE ORAL at 15:27

## 2019-09-03 RX ADMIN — Medication 90 MILLIGRAM(S): at 06:44

## 2019-09-03 RX ADMIN — Medication 325 MILLIGRAM(S): at 12:02

## 2019-09-03 RX ADMIN — PANTOPRAZOLE SODIUM 40 MILLIGRAM(S): 20 TABLET, DELAYED RELEASE ORAL at 12:02

## 2019-09-03 RX ADMIN — Medication 40 MILLIGRAM(S): at 06:44

## 2019-09-03 RX ADMIN — ACETAZOLAMIDE 125 MILLIGRAM(S): 250 TABLET ORAL at 15:28

## 2019-09-03 RX ADMIN — Medication 90 MILLIGRAM(S): at 15:28

## 2019-09-03 NOTE — PROGRESS NOTE ADULT - ATTENDING COMMENTS
nuclear negative for ischemia. moderate pulm HTN.,  likely due to COPD.  ct diuresis. no further cardiac work up is needed.   No further in-patient cardiac work-up/management is needed.  Follow-up in cardiology office in 2 weeks.
No further in-patient cardiac work-up/management is needed.  Follow-up in cardiology office in 2 weeks.
extra lasix 40 mg IV once. ct PO.   Multifactorial dyspnea. negative stress test. moderate pulmonary hypertension on echo.

## 2019-09-03 NOTE — PROGRESS NOTE ADULT - PROBLEM SELECTOR PLAN 2
+ edema  bnp >3000  Lasix 40mg extra dose today.   replace k and magnesium  I&0  Low sodium diet
+ edema  bnp >3000  axetazolamide extra dose today.   replace k and magnesium  I&0  Low sodium diet
diamox 125  daily . lasix 40 mg PO daily.
+ edema  bnp >3000  Lasix 40mg ivp now  replace k and magnesium  I&0  Low sodium diet

## 2019-09-03 NOTE — DISCHARGE NOTE NURSING/CASE MANAGEMENT/SOCIAL WORK - PATIENT PORTAL LINK FT
You can access the FollowMyHealth Patient Portal offered by [facility] by registering at the following website: [email or web address]. By joining [employer]’s FollowMyHealth portal, you will also be able to view your health information using other applications (apps) compatible with our system.

## 2019-09-03 NOTE — PROGRESS NOTE ADULT - ASSESSMENT
1. Acute hypoxic respiratory failure likely due to combination of ADHF HFpEF + pulmonary fibrosis.  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  No evidence of PNA on CT chest, sepsis ruled out.  Taper IV Steroids.  Will likely need outpatient PFTs, Sleep study.  Pulmonary evaluation requested.     1b. VIJAY - discussed reevaluation as outpatient and refitting for CPAP device.  Pt acknowledged understanding.    2. Afib with RVR - rate controlled, s/p Watchman off AC.  Outpatient cardiology followup discussed.   3. Anemia - Monitor CBC.  On Fe supplementation. CBC stable.   4. Hx of ETOH abuse with cirrhosis - -> stable  5. Right thyroid 1.4 cm lesion, mediastinal lymphadenopathy - Discussed possibilities including malignancy.  outpt workup including U/S.  He agreed and acknowledged understanding.  6. H/o BKA - PT followup.  Ordered ACE wrap to assist with fitment of stump into prosthetic.   7. Smoker - Nicotine Patch.  I strongly encouraged the patient to quit smoking.  I outlined the extensive negative impact of smoking on quality of life and the numerous consequences of continued smoking including lung disease, heart attack, stroke, vascular disease, malignancy, increased mortality, etc.  The patient understood these effects and agreed on smoking cessation.   > DVT Prophylaxis - Lovenox subcut    Pt refusing TANVIR placement.  Stable for d/c home.

## 2019-09-03 NOTE — PROGRESS NOTE ADULT - PROBLEM SELECTOR PROBLEM 3
Presence of Watchman left atrial appendage closure device
Atrial fibrillation with RVR

## 2019-09-03 NOTE — PROGRESS NOTE ADULT - PROBLEM SELECTOR PROBLEM 2
Atrial fibrillation with RVR
Chronic diastolic congestive heart failure

## 2019-09-03 NOTE — PROGRESS NOTE ADULT - SUBJECTIVE AND OBJECTIVE BOX
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  Admitted for hypoxic respiratory failure due to CHF / COPD / Pulmonary Fibrosis.  He also recalls h/o VIJAY, but not on CPAP due to claustrophobia.      Interval History:  Continued swelling at L Stump.  No erythema / pain.  Denies SOB.  SaO2 93% on RA, noted 84% while sleeping per RN.  On further questioning, his daughter Sarita states pt remains an active smoker.      ____________________PHYSICAL EXAM:  Vitals reviewed as indicated below  GENERAL:  NAD Alert and Oriented x 3   HEENT: NCAT  CARDIOVASCULAR:  S1, S2  LUNGS: coarse BS b/l  ABDOMEN:  soft, (-) tenderness, (-) distension, (+) bowel sounds, (-) guarding, (-) rebound (-) rigidity  EXTREMITIES:  no cyanosis / clubbing.  Mild edema.  L BKA.    ____________________     VITALS:  Vital Signs Last 24 Hrs  T(C): 36.9 (03 Sep 2019 06:47), Max: 36.9 (03 Sep 2019 06:47)  T(F): 98.4 (03 Sep 2019 06:47), Max: 98.4 (03 Sep 2019 06:47)  HR: 80 (03 Sep 2019 06:47) (74 - 85)  BP: 155/80 (03 Sep 2019 06:47) (134/77 - 155/80)  BP(mean): --  RR: 18 (03 Sep 2019 06:47) (18 - 18)  SpO2: 94% (03 Sep 2019 06:47) (94% - 95%) Daily     Daily Weight in k.3 (03 Sep 2019 04:34)  CAPILLARY BLOOD GLUCOSE        I&O's Summary    02 Sep 2019 07:01  -  03 Sep 2019 07:00  --------------------------------------------------------  IN: 600 mL / OUT: 3500 mL / NET: -2900 mL        LABS:                        MEDICATIONS:  acetaminophen   Tablet .. 650 milliGRAM(s) Oral every 6 hours PRN  ALBUTerol/ipratropium for Nebulization 3 milliLiter(s) Nebulizer every 6 hours PRN  ALPRAZolam 0.5 milliGRAM(s) Oral every 12 hours PRN  clopidogrel Tablet 75 milliGRAM(s) Oral daily  diltiazem    Tablet 90 milliGRAM(s) Oral three times a day  enoxaparin Injectable 40 milliGRAM(s) SubCutaneous daily  ferrous    sulfate 325 milliGRAM(s) Oral two times a day  ferrous sulfate Oral Tab/Cap - Peds 325 milliGRAM(s) Oral daily  furosemide    Tablet 40 milliGRAM(s) Oral daily  gabapentin 400 milliGRAM(s) Oral three times a day  pantoprazole   Suspension 40 milliGRAM(s) Oral daily  predniSONE   Tablet 40 milliGRAM(s) Oral daily  tamsulosin Oral Tab/Cap - Peds 0.4 milliGRAM(s) Oral at bedtime

## 2019-09-03 NOTE — PROGRESS NOTE ADULT - PROBLEM SELECTOR PLAN 1
c/w nebulizer  c/w solumedrol 40mg bid
c/w nebulizer  c/w solumedrol 40mg bid
inhalesr and steroids and abx
c/w nebulizer  c/w solumedrol 40mg bid

## 2019-09-03 NOTE — PROGRESS NOTE ADULT - PROBLEM SELECTOR PLAN 3
Rate controlled  Watchman is in place  cardizem 90mg tid  was not on long acting prep at home likely due to cost  c/w 90 tid

## 2019-09-03 NOTE — PROGRESS NOTE ADULT - SUBJECTIVE AND OBJECTIVE BOX
PULMONARY PROGRESS NOTE      [id number]    Patient is a 64y old  Male who presents with a chief complaint of shortness of breath (03 Sep 2019 12:02)      INTERVAL HPI/OVERNIGHT EVENTS:    MEDICATIONS  (STANDING):  acetazolamide    Tablet 125 milliGRAM(s) Oral daily  clopidogrel Tablet 75 milliGRAM(s) Oral daily  diltiazem    Tablet 90 milliGRAM(s) Oral three times a day  enoxaparin Injectable 40 milliGRAM(s) SubCutaneous daily  ferrous    sulfate 325 milliGRAM(s) Oral two times a day  ferrous sulfate Oral Tab/Cap - Peds 325 milliGRAM(s) Oral daily  furosemide    Tablet 40 milliGRAM(s) Oral daily  gabapentin 400 milliGRAM(s) Oral three times a day  pantoprazole   Suspension 40 milliGRAM(s) Oral daily  predniSONE   Tablet 40 milliGRAM(s) Oral daily  tamsulosin Oral Tab/Cap - Peds 0.4 milliGRAM(s) Oral at bedtime      MEDICATIONS  (PRN):  acetaminophen   Tablet .. 650 milliGRAM(s) Oral every 6 hours PRN Temp greater or equal to 38C (100.4F), Mild Pain (1 - 3)  ALBUTerol/ipratropium for Nebulization 3 milliLiter(s) Nebulizer every 6 hours PRN Shortness of Breath and/or Wheezing  ALPRAZolam 0.5 milliGRAM(s) Oral every 12 hours PRN anxiety      Allergies    Ceclor (Rash)  Duricef (Rash)    Intolerances        PAST MEDICAL & SURGICAL HISTORY:  Chronic CHF  COPD without exacerbation  Atrial fibrillation  Presence of Watchman left atrial appendage closure device  Hypertension  GERD (gastroesophageal reflux disease)  Chronic obstructive pulmonary disease (COPD)  Anemia  Falls  Meningitis  Collapsed lung  Alcohol withdrawal  Emphysema of lung  Cirrhosis  CHF (congestive heart failure)  Poor historian  Alcohol abuse  Chronic atrial fibrillation  Unilateral amputation of lower extremity below knee  Presence of Watchman left atrial appendage closure device  S/P BKA (below knee amputation) unilateral, left      SOCIAL HISTORY  Smoking History:       REVIEW OF SYSTEMS:    CONSTITUTIONAL:  No distress    HEENT:  Eyes:  No diplopia or blurred vision. ENT:  No earache, sore throat or runny nose.    CARDIOVASCULAR:  No pressure, squeezing, tightness, heaviness or aching about the chest; no palpitations.    RESPIRATORY:  No cough, shortness of breath, PND or orthopnea. Mild SOBOE    GASTROINTESTINAL:  No nausea, vomiting or diarrhea.    GENITOURINARY:  No dysuria, frequency or urgency.    MUSCULOSKELETAL:  No joint pain    SKIN:  No new lesions.    NEUROLOGIC:  No paresthesias, fasciculations, seizures or weakness.    PSYCHIATRIC:  No disorder of thought or mood.    ENDOCRINE:  No heat or cold intolerance, polyuria or polydipsia.    HEMATOLOGICAL:  No easy bruising or bleeding.     Vital Signs Last 24 Hrs  T(C): 36.8 (03 Sep 2019 10:09), Max: 36.9 (03 Sep 2019 06:47)  T(F): 98.2 (03 Sep 2019 10:09), Max: 98.4 (03 Sep 2019 06:47)  HR: 78 (03 Sep 2019 10:09) (74 - 85)  BP: 148/77 (03 Sep 2019 10:09) (134/77 - 155/80)  BP(mean): --  RR: 18 (03 Sep 2019 10:09) (18 - 18)  SpO2: 95% (03 Sep 2019 10:09) (94% - 95%)    PHYSICAL EXAMINATION:    GENERAL: The patient is awake and alert in no apparent distress.     HEENT: Head is normocephalic and atraumatic. Extraocular muscles are intact. Mucous membranes are moist.    NECK: Supple.    LUNGS: Clear to auscultation without wheezing, rales or rhonchi; respirations unlabored    HEART: Regular rate and rhythm without murmur.    ABDOMEN: Soft, nontender, and nondistended.      EXTREMITIES: Without any cyanosis, clubbing, rash, lesions or edema.    NEUROLOGIC: Grossly intact.    SKIN: No ulceration or induration present.      LABS:                              MICROBIOLOGY:    RADIOLOGY & ADDITIONAL STUDIES:< from: CT Chest No Cont (08.24.19 @ 06:33) >    IMPRESSION:   -Diffuse peripheral reticular opacities, which likely reflect pulmonary   interstitial fibrosis. Right pleural thickening and/or subsegmental   atelectasis, difficult to distinguish due to motion. No lobar   consolidation.    -Enlarged main pulmonary artery measuring 5.2 cm, indicative of pulmonary   hypertension.    -Nonspecific mediastinal lymphadenopathy measuring up to 1.6 cm.    -1.4 cm hypodense right thyroid lesion, for which nonemergent thyroid   ultrasound recommended for further characterization.      < end of copied text >

## 2019-09-03 NOTE — PROGRESS NOTE ADULT - ASSESSMENT
65 yo male with hx afib, pulmon HT, s/p watchman  and HFpEF  Definitelyy has  VIJAY and requires polysomnogram as outpatient  Agree with discharge on CPAP, I will see in office and order PSG with sleep f/u with my partners

## 2019-09-23 ENCOUNTER — APPOINTMENT (OUTPATIENT)
Dept: FAMILY MEDICINE | Facility: CLINIC | Age: 64
End: 2019-09-23

## 2019-10-07 ENCOUNTER — RX RENEWAL (OUTPATIENT)
Age: 64
End: 2019-10-07

## 2019-10-21 ENCOUNTER — APPOINTMENT (OUTPATIENT)
Dept: FAMILY MEDICINE | Facility: CLINIC | Age: 64
End: 2019-10-21

## 2019-10-22 ENCOUNTER — RX RENEWAL (OUTPATIENT)
Age: 64
End: 2019-10-22

## 2019-10-22 DIAGNOSIS — R60.9 EDEMA, UNSPECIFIED: ICD-10-CM

## 2019-11-18 ENCOUNTER — APPOINTMENT (OUTPATIENT)
Dept: FAMILY MEDICINE | Facility: CLINIC | Age: 64
End: 2019-11-18
Payer: MEDICARE

## 2019-11-18 ENCOUNTER — INPATIENT (INPATIENT)
Facility: HOSPITAL | Age: 64
LOS: 7 days | Discharge: HOME HEALTH | DRG: 189 | End: 2019-11-26
Attending: INTERNAL MEDICINE | Admitting: INTERNAL MEDICINE
Payer: MEDICARE

## 2019-11-18 VITALS
HEART RATE: 122 BPM | BODY MASS INDEX: 36.03 KG/M2 | HEIGHT: 72 IN | OXYGEN SATURATION: 84 % | WEIGHT: 266 LBS | SYSTOLIC BLOOD PRESSURE: 126 MMHG | DIASTOLIC BLOOD PRESSURE: 80 MMHG

## 2019-11-18 VITALS
HEART RATE: 110 BPM | RESPIRATION RATE: 17 BRPM | TEMPERATURE: 98 F | OXYGEN SATURATION: 96 % | HEIGHT: 72 IN | DIASTOLIC BLOOD PRESSURE: 66 MMHG | WEIGHT: 261.91 LBS | SYSTOLIC BLOOD PRESSURE: 111 MMHG

## 2019-11-18 DIAGNOSIS — J44.1 CHRONIC OBSTRUCTIVE PULMONARY DISEASE WITH (ACUTE) EXACERBATION: ICD-10-CM

## 2019-11-18 DIAGNOSIS — Z95.818 PRESENCE OF OTHER CARDIAC IMPLANTS AND GRAFTS: Chronic | ICD-10-CM

## 2019-11-18 DIAGNOSIS — S88.119A COMPLETE TRAUMATIC AMPUTATION AT LEVEL BETWEEN KNEE AND ANKLE, UNSPECIFIED LOWER LEG, INITIAL ENCOUNTER: Chronic | ICD-10-CM

## 2019-11-18 DIAGNOSIS — Z89.512 ACQUIRED ABSENCE OF LEFT LEG BELOW KNEE: Chronic | ICD-10-CM

## 2019-11-18 LAB
ALBUMIN SERPL ELPH-MCNC: 3.4 G/DL — SIGNIFICANT CHANGE UP (ref 3.3–5)
ALBUMIN SERPL ELPH-MCNC: 3.5 G/DL — SIGNIFICANT CHANGE UP (ref 3.3–5)
ALP SERPL-CCNC: 137 U/L — HIGH (ref 40–120)
ALP SERPL-CCNC: 152 U/L — HIGH (ref 40–120)
ALT FLD-CCNC: 13 U/L — SIGNIFICANT CHANGE UP (ref 12–78)
ALT FLD-CCNC: 14 U/L — SIGNIFICANT CHANGE UP (ref 12–78)
ANION GAP SERPL CALC-SCNC: 4 MMOL/L — LOW (ref 5–17)
APTT BLD: 26.9 SEC — LOW (ref 27.5–36.3)
AST SERPL-CCNC: 12 U/L — LOW (ref 15–37)
AST SERPL-CCNC: 14 U/L — LOW (ref 15–37)
BILIRUB DIRECT SERPL-MCNC: 0.2 MG/DL — SIGNIFICANT CHANGE UP (ref 0–0.2)
BILIRUB INDIRECT FLD-MCNC: 0.3 MG/DL — SIGNIFICANT CHANGE UP (ref 0.2–1)
BILIRUB SERPL-MCNC: 0.4 MG/DL — SIGNIFICANT CHANGE UP (ref 0.2–1.2)
BILIRUB SERPL-MCNC: 0.5 MG/DL — SIGNIFICANT CHANGE UP (ref 0.2–1.2)
BUN SERPL-MCNC: 18 MG/DL — SIGNIFICANT CHANGE UP (ref 7–23)
CALCIUM SERPL-MCNC: 8.8 MG/DL — SIGNIFICANT CHANGE UP (ref 8.5–10.1)
CHLORIDE SERPL-SCNC: 105 MMOL/L — SIGNIFICANT CHANGE UP (ref 96–108)
CO2 SERPL-SCNC: 30 MMOL/L — SIGNIFICANT CHANGE UP (ref 22–31)
CREAT SERPL-MCNC: 0.99 MG/DL — SIGNIFICANT CHANGE UP (ref 0.5–1.3)
GLUCOSE SERPL-MCNC: 123 MG/DL — HIGH (ref 70–99)
HCT VFR BLD CALC: 44.1 % — SIGNIFICANT CHANGE UP (ref 39–50)
HGB BLD-MCNC: 13 G/DL — SIGNIFICANT CHANGE UP (ref 13–17)
INR BLD: 1.14 RATIO — SIGNIFICANT CHANGE UP (ref 0.88–1.16)
MAGNESIUM SERPL-MCNC: 1.6 MG/DL — SIGNIFICANT CHANGE UP (ref 1.6–2.6)
MAGNESIUM SERPL-MCNC: 1.7 MG/DL — SIGNIFICANT CHANGE UP (ref 1.6–2.6)
MCHC RBC-ENTMCNC: 28.2 PG — SIGNIFICANT CHANGE UP (ref 27–34)
MCHC RBC-ENTMCNC: 29.5 GM/DL — LOW (ref 32–36)
MCV RBC AUTO: 95.7 FL — SIGNIFICANT CHANGE UP (ref 80–100)
NT-PROBNP SERPL-SCNC: 757 PG/ML — HIGH (ref 0–125)
PLATELET # BLD AUTO: 148 K/UL — LOW (ref 150–400)
POTASSIUM SERPL-MCNC: 4.1 MMOL/L — SIGNIFICANT CHANGE UP (ref 3.5–5.3)
POTASSIUM SERPL-SCNC: 4.1 MMOL/L — SIGNIFICANT CHANGE UP (ref 3.5–5.3)
PROT SERPL-MCNC: 7.4 GM/DL — SIGNIFICANT CHANGE UP (ref 6–8.3)
PROT SERPL-MCNC: 7.5 GM/DL — SIGNIFICANT CHANGE UP (ref 6–8.3)
PROTHROM AB SERPL-ACNC: 12.7 SEC — SIGNIFICANT CHANGE UP (ref 10–12.9)
RBC # BLD: 4.61 M/UL — SIGNIFICANT CHANGE UP (ref 4.2–5.8)
RBC # FLD: 20.6 % — HIGH (ref 10.3–14.5)
SODIUM SERPL-SCNC: 139 MMOL/L — SIGNIFICANT CHANGE UP (ref 135–145)
TROPONIN I SERPL-MCNC: <0.015 NG/ML — SIGNIFICANT CHANGE UP (ref 0.01–0.04)
TROPONIN I SERPL-MCNC: <0.015 NG/ML — SIGNIFICANT CHANGE UP (ref 0.01–0.04)
WBC # BLD: 6.4 K/UL — SIGNIFICANT CHANGE UP (ref 3.8–10.5)
WBC # FLD AUTO: 6.4 K/UL — SIGNIFICANT CHANGE UP (ref 3.8–10.5)

## 2019-11-18 PROCEDURE — 71275 CT ANGIOGRAPHY CHEST: CPT | Mod: 26

## 2019-11-18 PROCEDURE — 93005 ELECTROCARDIOGRAM TRACING: CPT

## 2019-11-18 PROCEDURE — 87070 CULTURE OTHR SPECIMN AEROBIC: CPT

## 2019-11-18 PROCEDURE — 80048 BASIC METABOLIC PNL TOTAL CA: CPT

## 2019-11-18 PROCEDURE — G0008: CPT

## 2019-11-18 PROCEDURE — 80076 HEPATIC FUNCTION PANEL: CPT

## 2019-11-18 PROCEDURE — 83880 ASSAY OF NATRIURETIC PEPTIDE: CPT

## 2019-11-18 PROCEDURE — 84484 ASSAY OF TROPONIN QUANT: CPT

## 2019-11-18 PROCEDURE — 80053 COMPREHEN METABOLIC PANEL: CPT

## 2019-11-18 PROCEDURE — 36600 WITHDRAWAL OF ARTERIAL BLOOD: CPT

## 2019-11-18 PROCEDURE — 94640 AIRWAY INHALATION TREATMENT: CPT

## 2019-11-18 PROCEDURE — 94660 CPAP INITIATION&MGMT: CPT

## 2019-11-18 PROCEDURE — 94761 N-INVAS EAR/PLS OXIMETRY MLT: CPT

## 2019-11-18 PROCEDURE — 99214 OFFICE O/P EST MOD 30 MIN: CPT

## 2019-11-18 PROCEDURE — 36415 COLL VENOUS BLD VENIPUNCTURE: CPT

## 2019-11-18 PROCEDURE — 71045 X-RAY EXAM CHEST 1 VIEW: CPT | Mod: 26

## 2019-11-18 PROCEDURE — 93971 EXTREMITY STUDY: CPT | Mod: RT

## 2019-11-18 PROCEDURE — 90686 IIV4 VACC NO PRSV 0.5 ML IM: CPT

## 2019-11-18 PROCEDURE — 85025 COMPLETE CBC W/AUTO DIFF WBC: CPT

## 2019-11-18 PROCEDURE — 82803 BLOOD GASES ANY COMBINATION: CPT

## 2019-11-18 PROCEDURE — 93010 ELECTROCARDIOGRAM REPORT: CPT

## 2019-11-18 PROCEDURE — 82140 ASSAY OF AMMONIA: CPT

## 2019-11-18 PROCEDURE — 83735 ASSAY OF MAGNESIUM: CPT

## 2019-11-18 RX ORDER — DILTIAZEM HCL 120 MG
10 CAPSULE, EXT RELEASE 24 HR ORAL ONCE
Refills: 0 | Status: DISCONTINUED | OUTPATIENT
Start: 2019-11-18 | End: 2019-11-18

## 2019-11-18 RX ORDER — INFLUENZA VIRUS VACCINE 15; 15; 15; 15 UG/.5ML; UG/.5ML; UG/.5ML; UG/.5ML
0.5 SUSPENSION INTRAMUSCULAR ONCE
Refills: 0 | Status: COMPLETED | OUTPATIENT
Start: 2019-11-18 | End: 2019-11-21

## 2019-11-18 RX ORDER — IPRATROPIUM BROMIDE 0.2 MG/ML
500 SOLUTION, NON-ORAL INHALATION EVERY 6 HOURS
Refills: 0 | Status: DISCONTINUED | OUTPATIENT
Start: 2019-11-18 | End: 2019-11-19

## 2019-11-18 RX ORDER — TRAZODONE HCL 50 MG
100 TABLET ORAL AT BEDTIME
Refills: 0 | Status: DISCONTINUED | OUTPATIENT
Start: 2019-11-18 | End: 2019-11-26

## 2019-11-18 RX ORDER — FUROSEMIDE 40 MG
40 TABLET ORAL DAILY
Refills: 0 | Status: DISCONTINUED | OUTPATIENT
Start: 2019-11-18 | End: 2019-11-22

## 2019-11-18 RX ORDER — FERROUS SULFATE 325(65) MG
325 TABLET ORAL DAILY
Refills: 0 | Status: DISCONTINUED | OUTPATIENT
Start: 2019-11-18 | End: 2019-11-26

## 2019-11-18 RX ORDER — CLOPIDOGREL BISULFATE 75 MG/1
75 TABLET, FILM COATED ORAL DAILY
Refills: 0 | Status: DISCONTINUED | OUTPATIENT
Start: 2019-11-18 | End: 2019-11-26

## 2019-11-18 RX ORDER — ALBUTEROL 90 UG/1
2 AEROSOL, METERED ORAL EVERY 4 HOURS
Refills: 0 | Status: DISCONTINUED | OUTPATIENT
Start: 2019-11-18 | End: 2019-11-26

## 2019-11-18 RX ORDER — TAMSULOSIN HYDROCHLORIDE 0.4 MG/1
0.4 CAPSULE ORAL AT BEDTIME
Refills: 0 | Status: DISCONTINUED | OUTPATIENT
Start: 2019-11-18 | End: 2019-11-26

## 2019-11-18 RX ORDER — IPRATROPIUM/ALBUTEROL SULFATE 18-103MCG
3 AEROSOL WITH ADAPTER (GRAM) INHALATION ONCE
Refills: 0 | Status: COMPLETED | OUTPATIENT
Start: 2019-11-18 | End: 2019-11-18

## 2019-11-18 RX ORDER — PANTOPRAZOLE SODIUM 20 MG/1
40 TABLET, DELAYED RELEASE ORAL DAILY
Refills: 0 | Status: DISCONTINUED | OUTPATIENT
Start: 2019-11-18 | End: 2019-11-19

## 2019-11-18 RX ORDER — IPRATROPIUM/ALBUTEROL SULFATE 18-103MCG
3 AEROSOL WITH ADAPTER (GRAM) INHALATION EVERY 6 HOURS
Refills: 0 | Status: DISCONTINUED | OUTPATIENT
Start: 2019-11-18 | End: 2019-11-26

## 2019-11-18 RX ORDER — ACETAZOLAMIDE 250 MG/1
125 TABLET ORAL DAILY
Refills: 0 | Status: DISCONTINUED | OUTPATIENT
Start: 2019-11-18 | End: 2019-11-26

## 2019-11-18 RX ORDER — THIAMINE MONONITRATE (VIT B1) 100 MG
1 TABLET ORAL
Qty: 0 | Refills: 0 | DISCHARGE

## 2019-11-18 RX ORDER — TRAZODONE HCL 50 MG
1 TABLET ORAL
Qty: 0 | Refills: 0 | DISCHARGE

## 2019-11-18 RX ORDER — DILTIAZEM HCL 120 MG
120 CAPSULE, EXT RELEASE 24 HR ORAL DAILY
Refills: 0 | Status: DISCONTINUED | OUTPATIENT
Start: 2019-11-18 | End: 2019-11-22

## 2019-11-18 RX ORDER — ALBUTEROL 90 UG/1
1 AEROSOL, METERED ORAL EVERY 4 HOURS
Refills: 0 | Status: DISCONTINUED | OUTPATIENT
Start: 2019-11-18 | End: 2019-11-26

## 2019-11-18 RX ORDER — DILTIAZEM HCL 120 MG
5 CAPSULE, EXT RELEASE 24 HR ORAL ONCE
Refills: 0 | Status: COMPLETED | OUTPATIENT
Start: 2019-11-18 | End: 2019-11-18

## 2019-11-18 RX ORDER — BUDESONIDE AND FORMOTEROL FUMARATE DIHYDRATE 160; 4.5 UG/1; UG/1
2 AEROSOL RESPIRATORY (INHALATION)
Refills: 0 | Status: DISCONTINUED | OUTPATIENT
Start: 2019-11-18 | End: 2019-11-26

## 2019-11-18 RX ORDER — GABAPENTIN 400 MG/1
400 CAPSULE ORAL THREE TIMES A DAY
Refills: 0 | Status: DISCONTINUED | OUTPATIENT
Start: 2019-11-18 | End: 2019-11-26

## 2019-11-18 RX ORDER — TIOTROPIUM BROMIDE 18 UG/1
1 CAPSULE ORAL; RESPIRATORY (INHALATION) DAILY
Refills: 0 | Status: DISCONTINUED | OUTPATIENT
Start: 2019-11-18 | End: 2019-11-26

## 2019-11-18 RX ORDER — ASPIRIN/CALCIUM CARB/MAGNESIUM 324 MG
81 TABLET ORAL ONCE
Refills: 0 | Status: COMPLETED | OUTPATIENT
Start: 2019-11-18 | End: 2019-11-18

## 2019-11-18 RX ORDER — ASPIRIN/CALCIUM CARB/MAGNESIUM 324 MG
81 TABLET ORAL DAILY
Refills: 0 | Status: DISCONTINUED | OUTPATIENT
Start: 2019-11-18 | End: 2019-11-26

## 2019-11-18 RX ADMIN — Medication 3 MILLILITER(S): at 17:24

## 2019-11-18 RX ADMIN — Medication 125 MILLIGRAM(S): at 17:23

## 2019-11-18 RX ADMIN — Medication 5 MILLIGRAM(S): at 18:07

## 2019-11-18 RX ADMIN — Medication 81 MILLIGRAM(S): at 17:23

## 2019-11-18 NOTE — PHARMACOTHERAPY INTERVENTION NOTE - COMMENTS
Med rec is complete. checked [facility] for med list and confirmed with patients daughter, Snehal over the phone. She states that they are unaware of which strength the patient should be on for the Diltiazem  or 240 mg. They were waiting for Dr. Juarez to confirm the dose.

## 2019-11-18 NOTE — ED PROVIDER NOTE - NEUROLOGICAL, MLM
Alert and oriented, no focal deficits, no motor or sensory deficits. Alert and oriented, no focal deficits, no motor or sensory deficits. CNs 2-12 intact. No nuchal rigidity.

## 2019-11-18 NOTE — ADDENDUM
[FreeTextEntry1] : I, Mary Carmona acting as a scribe for Dr. Augusta Vasquez on Nov 18, 2019  at 3:15 PM\par

## 2019-11-18 NOTE — ED ADULT NURSE NOTE - NSIMPLEMENTINTERV_GEN_ALL_ED
Implemented All Fall Risk Interventions:  [place] to call system. Call bell, personal items and telephone within reach. Instruct patient to call for assistance. Room bathroom lighting operational. Non-slip footwear when patient is off stretcher. Physically safe environment: no spills, clutter or unnecessary equipment. Stretcher in lowest position, wheels locked, appropriate side rails in place. Provide visual cue, wrist band, yellow gown, etc. Monitor gait and stability. Monitor for mental status changes and reorient to person, place, and time. Review medications for side effects contributing to fall risk. Reinforce activity limits and safety measures with patient and family. Implemented All Universal Safety Interventions:  [place] to call system. Call bell, personal items and telephone within reach. Instruct patient to call for assistance. Room bathroom lighting operational. Non-slip footwear when patient is off stretcher. Physically safe environment: no spills, clutter or unnecessary equipment. Stretcher in lowest position, wheels locked, appropriate side rails in place.

## 2019-11-18 NOTE — ED PROVIDER NOTE - CLINICAL SUMMARY MEDICAL DECISION MAKING FREE TEXT BOX
63 y/o male presents to the ED for SOB worse with exertion. Will obtain labs, CXR, EKG, albuterol and solumedrol treatment, CTA to r/o PE and admit for likely CHF exacerbation due to pt being hypoxic on RA.

## 2019-11-18 NOTE — ED PROVIDER NOTE - SKIN, MLM
+red discoloration to RLE likely related to PVD +red discoloration to RLE likely related to chronic PVD

## 2019-11-18 NOTE — H&P ADULT - HISTORY OF PRESENT ILLNESS
64 year old male patient with 27 pack year smoking history presented to the ED complaining of 2 day history fo dyspnea on exertion. Endorses profound dyspnea if he ambulated more than 20 to 30 feet.  Dyspnea preceded by a dry cough. Patient endorsed recent car ride to [facility] but denies any chest pain, palpitations, leg swelling, fever, chills, sick contacts, recent cigarette use(quit smoking 1 week prior)

## 2019-11-18 NOTE — HISTORY OF PRESENT ILLNESS
[Family Member] : family member [FreeTextEntry1] : follow up on afib/insomnia/anemia  [de-identified] : STONE is a 64 year male here for f/u. Patient accompanied by daughter. Pt c/o SOB since this morning. States that from the car into my office he was very short of breath. Denies sickness. Coughing up phlegm from lungs. Pt does not have O2 at home due to him being a smoker. Pt is up 13 lbs since 7/22/19. Hx CHF.

## 2019-11-18 NOTE — ED PROVIDER NOTE - ATTENDING CONTRIBUTION TO CARE
I Nikunj Leon MD saw and examined the patient. MLP saw and examined the patient under my supervision. I discussed the care of the patient with MLP and agree with MLP's plan, assessment and care of the patient while in the ED.

## 2019-11-18 NOTE — PLAN
[FreeTextEntry1] : - Pt sent to ED via ambulance due to O2 sat being 84 and \par - Encouraged to bring Aide at next visit to ensure proper plan \par - Pt needs to f/u q month

## 2019-11-18 NOTE — PHYSICAL EXAM
[No Acute Distress] : no acute distress [Well Nourished] : well nourished [Well Developed] : well developed [Well-Appearing] : well-appearing [PERRL] : pupils equal round and reactive to light [Normal Sclera/Conjunctiva] : normal sclera/conjunctiva [EOMI] : extraocular movements intact [Normal Outer Ear/Nose] : the outer ears and nose were normal in appearance [Normal Oropharynx] : the oropharynx was normal [No Lymphadenopathy] : no lymphadenopathy [No JVD] : no jugular venous distention [Thyroid Normal, No Nodules] : the thyroid was normal and there were no nodules present [Supple] : supple [No Accessory Muscle Use] : no accessory muscle use [No Respiratory Distress] : no respiratory distress  [Clear to Auscultation] : lungs were clear to auscultation bilaterally [Normal Rate] : normal rate  [Regular Rhythm] : with a regular rhythm [No Murmur] : no murmur heard [No Carotid Bruits] : no carotid bruits [Normal S1, S2] : normal S1 and S2 [No Abdominal Bruit] : a ~M bruit was not heard ~T in the abdomen [No Varicosities] : no varicosities [No Edema] : there was no peripheral edema [Pedal Pulses Present] : the pedal pulses are present [No Extremity Clubbing/Cyanosis] : no extremity clubbing/cyanosis [No Palpable Aorta] : no palpable aorta [Soft] : abdomen soft [Non-distended] : non-distended [Non Tender] : non-tender [No HSM] : no HSM [No Masses] : no abdominal mass palpated [Normal Posterior Cervical Nodes] : no posterior cervical lymphadenopathy [Normal Bowel Sounds] : normal bowel sounds [Normal Anterior Cervical Nodes] : no anterior cervical lymphadenopathy [No CVA Tenderness] : no CVA  tenderness [No Spinal Tenderness] : no spinal tenderness [Grossly Normal Strength/Tone] : grossly normal strength/tone [No Joint Swelling] : no joint swelling [No Rash] : no rash [No Focal Deficits] : no focal deficits [Coordination Grossly Intact] : coordination grossly intact [Deep Tendon Reflexes (DTR)] : deep tendon reflexes were 2+ and symmetric [Normal Gait] : normal gait [Normal Insight/Judgement] : insight and judgment were intact [Normal Affect] : the affect was normal

## 2019-11-18 NOTE — ED PROVIDER NOTE - OBJECTIVE STATEMENT
65 y/o male with PMHx of Afib, EtOH abuse with h/o withdrawal, cirrhosis, anemia, COPD, GERD, HTN, CHF, s/p left BKA, and presence of Watchman left atrial appendage closure device presents to the ED BIBEMS from PMD office for +SOB. SOB worsens with exertion, +ALANIZ. Denies CP, fever, cough, abd pain, or leg swelling. Recent car ride to [facility], 7 hours long 2 weeks ago. Last ETOH use yesterday. No hx of seizures with alcohol withdrawal. Current every day smoker. Allergic to Ceclor and Duricef. PCP: Dr. Augusta Juarez.

## 2019-11-18 NOTE — H&P ADULT - ASSESSMENT
64 year old male patient with ALANIZ and hypoxia (no documented hypoxic readings recorded) on room air    -Admit to [facility]      #COPD exacerbation   -on supplemental oxygen via NC  -start solumedrol 40 mg BID  -standing duonebs  Spiriva  -consider Pulm consult if no improvement    #Acute hypoxemic respiratory failure  -DDX: COPD exacerbation, PE, PNA  -likely 2/2 to COPD  -CTA chest negative for PE, volume overload or effusion.    #Thrombocytopenia  -no signs of bleeding  -trend plts    #Elevated Alk Phos  -no current GI symptoms  -f/u morning cmp and consider sono if still elevated    #Diastolic CHF  -Last EF 65 percent   -currently euvolemic  -on lasix    #Afib  -on cardizem for rate control  -pt had left atrial appendage procedure done/Watchman    #ETOH abuse  -states he does not drink as much as he usually does  -last drink a few hours prior to admission  -will start daily librium    #HTN  -on lasix     #BPH  -on flomax    #DVT ppx  -ambulation 64 year old male patient with ALANIZ and hypoxia (no documented hypoxic readings recorded) on room air    -Admit to [facility]      #COPD exacerbation   -on supplemental oxygen via NC  -start solumedrol 40 mg BID  -standing duonebs  Spiriva  -consider Pulm consult if no improvement    #Acute hypoxemic respiratory failure  -DDX: COPD exacerbation, PE, PNA  -likely 2/2 to COPD  -CTA chest negative for PE, volume overload or effusion.    #Thrombocytopenia  -no signs of bleeding  -trend plts    #Elevated Alk Phos  -no current GI symptoms  -f/u morning cmp and consider sono if still elevated    #Diastolic CHF  -Last EF 65 percent   -currently euvolemic  -on lasix    #Afib  -on cardizem for rate control  -pt had left atrial appendage procedure done/Watchman    #ETOH abuse  -states he does not drink as much as he usually does  -last drink a few hours prior to admission  -will start daily librium    #HTN  -on lasix     #VIJAY  -pt on CPAP but does not remember the settings  -daughter to confirm settings    #BPH  -on flomax    #DVT ppx  -ambulation

## 2019-11-18 NOTE — H&P ADULT - NSHPSOCIALHISTORY_GEN_ALL_CORE
Patient states he smoked 1 PPD for 27 years. quit one week ago  has had periods of alcohol use in the past- states he rarely consumes alcohol as of late

## 2019-11-18 NOTE — END OF VISIT
[FreeTextEntry3] : Medical record entries made by the scribe today today, were at my direction and personally dictated to them by me, Dr. Augusta Vasquez on Nov 18, 2019. I have reviewed the chart and agree that the record accurately reflects my personal performance of the history, physical exam, assessment, and plan.\par \par

## 2019-11-18 NOTE — ED ADULT NURSE NOTE - OBJECTIVE STATEMENT
see trauma sheet Patient presents to ED complaining of SOB. Patient complaining of SOB and productive cough x 2 weeks. Patient states SOb worsens with exertion. Patient was seen by PCP today and sent to ED for further evaluation. Patient states recently stopped smoking and has had increased cough. Patient denies CP, fever, chills, NVD, dizziness, HA

## 2019-11-18 NOTE — H&P ADULT - NSHPPHYSICALEXAM_GEN_ALL_CORE
Vital Signs Last 24 Hrs  T(C): 36.5 (18 Nov 2019 21:51), Max: 36.7 (18 Nov 2019 16:49)  T(F): 97.7 (18 Nov 2019 21:51), Max: 98 (18 Nov 2019 16:49)  HR: 93 (18 Nov 2019 21:51) (93 - 112)  BP: 103/81 (18 Nov 2019 21:51) (103/81 - 118/62)  BP(mean): --  RR: 18 (18 Nov 2019 21:51) (17 - 18)  SpO2: 95% (18 Nov 2019 21:51) (95% - 97%)

## 2019-11-18 NOTE — ED PROVIDER NOTE - PROGRESS NOTE DETAILS
63 yo male with a PMH of copd ((not on home O2), current smoker, etoh abuse, s/p cirrhosis of the liver, a fib on plavix, htn, chf, L BKA from work related accident presents with SOB. Per daughter, pt was walking to his PMD and felt SOB, worse with exertion. Pt was called an ambulance. Denies cp, abd pain, n.v.dm f/c/sweating. Labs, EKG, CXR, CTA, meds, Reeval. -Gurjit Mcclain PA-C Pt with af ib RVR. Will order 5mg cardiazem. -Gurjit Mcclain PA-C 63 yo male with a PMH of copd ((not on home O2), current smoker, etoh abuse, s/p cirrhosis of the liver, a fib on plavix, htn, chf, L BKA from work related accident presents with SOB. Per daughter, pt was walking to his PMD and felt SOB, worse with exertion. Pt was called an ambulance. Reent 7 hour long car ride 2 weeks ago to the [facility]. Denies cp, abd pain, n.v.dm f/c/sweating. Labs, EKG, CXR, CTA, meds, Reeval. -Gurjit Mcclain PA-C Pt with afib RVR. Will order 5mg cardiazem. -Gurjit Mcclain PA-C

## 2019-11-18 NOTE — ED PROVIDER NOTE - CARDIAC, MLM
tachycardiac rate, irregularly irregular rhythm.  Heart sounds S1, S2.  No murmurs, rubs or gallops. tachycardiac rate, irregularly irregular rhythm.  Heart sounds S1, S2.  No rubs or gallops.

## 2019-11-18 NOTE — ED PROVIDER NOTE - NS_ ATTENDINGSCRIBEDETAILS _ED_A_ED_FT
I Nikunj Leon MD saw and examined the patient. Scribe documented for me and under my supervision. I have modified the scribe's documentation where necessary to reflect my history, physical exam and other relevant documentations pertinent to the care of the patient.

## 2019-11-19 LAB
ALBUMIN SERPL ELPH-MCNC: 3.3 G/DL — SIGNIFICANT CHANGE UP (ref 3.3–5)
ALP SERPL-CCNC: 137 U/L — HIGH (ref 40–120)
ALT FLD-CCNC: 14 U/L — SIGNIFICANT CHANGE UP (ref 12–78)
ANION GAP SERPL CALC-SCNC: 4 MMOL/L — LOW (ref 5–17)
AST SERPL-CCNC: 13 U/L — LOW (ref 15–37)
BASOPHILS # BLD AUTO: 0 K/UL — SIGNIFICANT CHANGE UP (ref 0–0.2)
BASOPHILS NFR BLD AUTO: 0 % — SIGNIFICANT CHANGE UP (ref 0–2)
BILIRUB SERPL-MCNC: 0.5 MG/DL — SIGNIFICANT CHANGE UP (ref 0.2–1.2)
BUN SERPL-MCNC: 16 MG/DL — SIGNIFICANT CHANGE UP (ref 7–23)
CALCIUM SERPL-MCNC: 8.8 MG/DL — SIGNIFICANT CHANGE UP (ref 8.5–10.1)
CHLORIDE SERPL-SCNC: 104 MMOL/L — SIGNIFICANT CHANGE UP (ref 96–108)
CO2 SERPL-SCNC: 28 MMOL/L — SIGNIFICANT CHANGE UP (ref 22–31)
CREAT SERPL-MCNC: 0.9 MG/DL — SIGNIFICANT CHANGE UP (ref 0.5–1.3)
EOSINOPHIL # BLD AUTO: 0 K/UL — SIGNIFICANT CHANGE UP (ref 0–0.5)
EOSINOPHIL NFR BLD AUTO: 0 % — SIGNIFICANT CHANGE UP (ref 0–6)
GLUCOSE SERPL-MCNC: 180 MG/DL — HIGH (ref 70–99)
GRAM STN FLD: SIGNIFICANT CHANGE UP
HCT VFR BLD CALC: 42 % — SIGNIFICANT CHANGE UP (ref 39–50)
HGB BLD-MCNC: 12.8 G/DL — LOW (ref 13–17)
IMM GRANULOCYTES NFR BLD AUTO: 0.4 % — SIGNIFICANT CHANGE UP (ref 0–1.5)
LYMPHOCYTES # BLD AUTO: 0.29 K/UL — LOW (ref 1–3.3)
LYMPHOCYTES # BLD AUTO: 5.7 % — LOW (ref 13–44)
MCHC RBC-ENTMCNC: 28.7 PG — SIGNIFICANT CHANGE UP (ref 27–34)
MCHC RBC-ENTMCNC: 30.5 GM/DL — LOW (ref 32–36)
MCV RBC AUTO: 94.2 FL — SIGNIFICANT CHANGE UP (ref 80–100)
MONOCYTES # BLD AUTO: 0.08 K/UL — SIGNIFICANT CHANGE UP (ref 0–0.9)
MONOCYTES NFR BLD AUTO: 1.6 % — LOW (ref 2–14)
NEUTROPHILS # BLD AUTO: 4.71 K/UL — SIGNIFICANT CHANGE UP (ref 1.8–7.4)
NEUTROPHILS NFR BLD AUTO: 92.3 % — HIGH (ref 43–77)
PLATELET # BLD AUTO: 166 K/UL — SIGNIFICANT CHANGE UP (ref 150–400)
POTASSIUM SERPL-MCNC: 4.1 MMOL/L — SIGNIFICANT CHANGE UP (ref 3.5–5.3)
POTASSIUM SERPL-SCNC: 4.1 MMOL/L — SIGNIFICANT CHANGE UP (ref 3.5–5.3)
PROT SERPL-MCNC: 7.4 GM/DL — SIGNIFICANT CHANGE UP (ref 6–8.3)
RBC # BLD: 4.46 M/UL — SIGNIFICANT CHANGE UP (ref 4.2–5.8)
RBC # FLD: 20 % — HIGH (ref 10.3–14.5)
SODIUM SERPL-SCNC: 136 MMOL/L — SIGNIFICANT CHANGE UP (ref 135–145)
SPECIMEN SOURCE: SIGNIFICANT CHANGE UP
WBC # BLD: 5.1 K/UL — SIGNIFICANT CHANGE UP (ref 3.8–10.5)
WBC # FLD AUTO: 5.1 K/UL — SIGNIFICANT CHANGE UP (ref 3.8–10.5)

## 2019-11-19 PROCEDURE — 93971 EXTREMITY STUDY: CPT | Mod: 26,RT

## 2019-11-19 PROCEDURE — 93010 ELECTROCARDIOGRAM REPORT: CPT

## 2019-11-19 RX ORDER — HEPARIN SODIUM 5000 [USP'U]/ML
5000 INJECTION INTRAVENOUS; SUBCUTANEOUS EVERY 8 HOURS
Refills: 0 | Status: DISCONTINUED | OUTPATIENT
Start: 2019-11-19 | End: 2019-11-24

## 2019-11-19 RX ORDER — NICOTINE POLACRILEX 2 MG
1 GUM BUCCAL DAILY
Refills: 0 | Status: DISCONTINUED | OUTPATIENT
Start: 2019-11-19 | End: 2019-11-26

## 2019-11-19 RX ORDER — ACETAMINOPHEN 500 MG
650 TABLET ORAL EVERY 6 HOURS
Refills: 0 | Status: DISCONTINUED | OUTPATIENT
Start: 2019-11-19 | End: 2019-11-26

## 2019-11-19 RX ORDER — PANTOPRAZOLE SODIUM 20 MG/1
40 TABLET, DELAYED RELEASE ORAL
Refills: 0 | Status: DISCONTINUED | OUTPATIENT
Start: 2019-11-19 | End: 2019-11-26

## 2019-11-19 RX ADMIN — Medication 1 PATCH: at 12:46

## 2019-11-19 RX ADMIN — Medication 600 MILLIGRAM(S): at 12:46

## 2019-11-19 RX ADMIN — Medication 650 MILLIGRAM(S): at 12:06

## 2019-11-19 RX ADMIN — Medication 3 MILLILITER(S): at 08:20

## 2019-11-19 RX ADMIN — PANTOPRAZOLE SODIUM 40 MILLIGRAM(S): 20 TABLET, DELAYED RELEASE ORAL at 12:48

## 2019-11-19 RX ADMIN — Medication 40 MILLIGRAM(S): at 05:28

## 2019-11-19 RX ADMIN — TAMSULOSIN HYDROCHLORIDE 0.4 MILLIGRAM(S): 0.4 CAPSULE ORAL at 21:00

## 2019-11-19 RX ADMIN — GABAPENTIN 400 MILLIGRAM(S): 400 CAPSULE ORAL at 21:00

## 2019-11-19 RX ADMIN — ACETAZOLAMIDE 125 MILLIGRAM(S): 250 TABLET ORAL at 12:47

## 2019-11-19 RX ADMIN — Medication 650 MILLIGRAM(S): at 21:26

## 2019-11-19 RX ADMIN — Medication 600 MILLIGRAM(S): at 21:00

## 2019-11-19 RX ADMIN — HEPARIN SODIUM 5000 UNIT(S): 5000 INJECTION INTRAVENOUS; SUBCUTANEOUS at 21:00

## 2019-11-19 RX ADMIN — Medication 3 MILLILITER(S): at 20:09

## 2019-11-19 RX ADMIN — Medication 120 MILLIGRAM(S): at 05:28

## 2019-11-19 RX ADMIN — Medication 3 MILLILITER(S): at 02:26

## 2019-11-19 RX ADMIN — GABAPENTIN 400 MILLIGRAM(S): 400 CAPSULE ORAL at 14:37

## 2019-11-19 RX ADMIN — Medication 325 MILLIGRAM(S): at 12:47

## 2019-11-19 RX ADMIN — CLOPIDOGREL BISULFATE 75 MILLIGRAM(S): 75 TABLET, FILM COATED ORAL at 12:47

## 2019-11-19 RX ADMIN — Medication 40 MILLIGRAM(S): at 17:04

## 2019-11-19 RX ADMIN — Medication 1 PATCH: at 21:05

## 2019-11-19 RX ADMIN — GABAPENTIN 400 MILLIGRAM(S): 400 CAPSULE ORAL at 05:27

## 2019-11-19 RX ADMIN — Medication 100 MILLIGRAM(S): at 21:00

## 2019-11-19 RX ADMIN — Medication 81 MILLIGRAM(S): at 12:47

## 2019-11-19 RX ADMIN — Medication 3 MILLILITER(S): at 13:32

## 2019-11-19 RX ADMIN — Medication 40 MILLIGRAM(S): at 12:50

## 2019-11-19 RX ADMIN — Medication 650 MILLIGRAM(S): at 20:56

## 2019-11-19 RX ADMIN — Medication 5 MILLIGRAM(S): at 04:59

## 2019-11-19 RX ADMIN — HEPARIN SODIUM 5000 UNIT(S): 5000 INJECTION INTRAVENOUS; SUBCUTANEOUS at 14:37

## 2019-11-19 NOTE — PROGRESS NOTE ADULT - SUBJECTIVE AND OBJECTIVE BOX
cc: sob  hpi: 64y male w/ pmh copd p/w worsening sob, cough    ros    Vital Signs Last 24 Hrs  T(C): 36.6 (19 Nov 2019 04:01), Max: 36.9 (18 Nov 2019 22:40)  T(F): 97.9 (19 Nov 2019 04:01), Max: 98.4 (18 Nov 2019 22:40)  HR: 76 (19 Nov 2019 05:24) (76 - 112)  BP: 122/64 (19 Nov 2019 05:24) (103/81 - 134/66)  BP(mean): --  RR: 18 (19 Nov 2019 05:24) (17 - 18)  SpO2: 97% (19 Nov 2019 05:24) (95% - 97%)    physical          LABS: All Labs Reviewed:                        13.0   6.40  )-----------( 148      ( 18 Nov 2019 16:55 )             44.1     11-18    139  |  105  |  18  ----------------------------<  123<H>  4.1   |  30  |  0.99    Ca    8.8      18 Nov 2019 16:55  Mg     1.6     11-18    TPro  7.5  /  Alb  3.5  /  TBili  0.5  /  DBili  0.2  /  AST  12<L>  /  ALT  13  /  AlkPhos  152<H>  11-18    PT/INR - ( 18 Nov 2019 16:55 )   PT: 12.7 sec;   INR: 1.14 ratio         PTT - ( 18 Nov 2019 16:55 )  PTT:26.9 sec  CARDIAC MARKERS ( 18 Nov 2019 20:18 )  <0.015 ng/mL / x     / x     / x     / x      CARDIAC MARKERS ( 18 Nov 2019 16:55 )  <0.015 ng/mL / x     / x     / x     / x          < from: CT Angio Chest PE Protocol w/ IV Cont (11.18.19 @ 18:58) >    IMPRESSION:     No pulmonary embolism.  Marked cardiomegaly.  Mild interstitial lung disease.  Pulmonary hypertension.    < end of copied text >        MEDICATIONS  (STANDING):  acetaZOLAMIDE    Tablet 125 milliGRAM(s) Oral daily  ALBUTerol    90 MICROgram(s) HFA Inhaler 1 Puff(s) Inhalation every 4 hours  albuterol/ipratropium for Nebulization 3 milliLiter(s) Nebulizer every 6 hours  aspirin  chewable 81 milliGRAM(s) Oral daily  budesonide 160 MICROgram(s)/formoterol 4.5 MICROgram(s) Inhaler 2 Puff(s) Inhalation two times a day  chlordiazePOXIDE 5 milliGRAM(s) Oral daily  clopidogrel Tablet 75 milliGRAM(s) Oral daily  diltiazem    milliGRAM(s) Oral daily  ferrous sulfate Oral Tab/Cap - Peds 325 milliGRAM(s) Oral daily  furosemide    Tablet 40 milliGRAM(s) Oral daily  gabapentin 400 milliGRAM(s) Oral three times a day  influenza   Vaccine 0.5 milliLiter(s) IntraMuscular once  ipratropium    for Nebulization 500 MICROGram(s) Nebulizer every 6 hours  pantoprazole   Suspension 40 milliGRAM(s) Oral daily  predniSONE   Tablet 40 milliGRAM(s) Oral two times a day  tamsulosin Oral Tab/Cap - Peds 0.4 milliGRAM(s) Oral at bedtime  tiotropium 18 MICROgram(s) Capsule 1 Capsule(s) Inhalation daily  traZODone 100 milliGRAM(s) Oral at bedtime    MEDICATIONS  (PRN):  ALBUTerol    90 MICROgram(s) HFA Inhaler 2 Puff(s) Inhalation every 4 hours PRN Shortness of Breath            Assessment and Plan:   64y male w/     1. acute on chronic resp failure due to COPD exacerbation  - CTA no pna or PE  - change to iv steroids  - nebs  - VIJAY on cpap    2. chronic diastolic chf  - stable, continue home meds    3. afib  - s/p watchman device  - cardizem     4. dvt px  heparin sc cc: sob  hpi: 64y male w/ pmh copd p/w worsening sob, cough.   Still coughing a lot this morning- productive yellow sputum.  Sob w/ cough.  Rt leg discomfort, swelling.      REVIEW OF SYSTEMS:  General: No weakness, fevers, chills  HEENT: No HA, neck pain, no visual changes, no hearing loss   Resp: +cough; no wheezing, hemoptysis;  sob w/ cough  CV: No chest pain or palpitations  GI: No abdominal pain, no n/v/d, no blood in stool  : No f/u/d  Neuro: No weakness or numbness  MSK: rt leg pain, redness  All other ROS negative unless indicated above.        Vital Signs Last 24 Hrs  T(C): 36.6 (19 Nov 2019 04:01), Max: 36.9 (18 Nov 2019 22:40)  T(F): 97.9 (19 Nov 2019 04:01), Max: 98.4 (18 Nov 2019 22:40)  HR: 76 (19 Nov 2019 05:24) (76 - 112)  BP: 122/64 (19 Nov 2019 05:24) (103/81 - 134/66)  BP(mean): --  RR: 18 (19 Nov 2019 05:24) (17 - 18)  SpO2: 97% (19 Nov 2019 05:24) (95% - 97%)      PHYSICAL EXAM:    General: NAD, comfortable  Neuro: AAOx3, no focal deficits  HEENT: NCAT, EOMI  Neck: Soft and supple, No JVD  Respiratory: decreased airway movement b/l; no w/r/r  Cardiovascular: S1 and S2, RRR  Gastrointestinal: +BS, soft, NTND  Extremities: RLE edema, erythema, warm; LLE bka  Musculoskeletal: strength 5/5       LABS: All Labs Reviewed:                        13.0   6.40  )-----------( 148      ( 18 Nov 2019 16:55 )             44.1     11-18    139  |  105  |  18  ----------------------------<  123<H>  4.1   |  30  |  0.99    Ca    8.8      18 Nov 2019 16:55  Mg     1.6     11-18    TPro  7.5  /  Alb  3.5  /  TBili  0.5  /  DBili  0.2  /  AST  12<L>  /  ALT  13  /  AlkPhos  152<H>  11-18    PT/INR - ( 18 Nov 2019 16:55 )   PT: 12.7 sec;   INR: 1.14 ratio         PTT - ( 18 Nov 2019 16:55 )  PTT:26.9 sec  CARDIAC MARKERS ( 18 Nov 2019 20:18 )  <0.015 ng/mL / x     / x     / x     / x      CARDIAC MARKERS ( 18 Nov 2019 16:55 )  <0.015 ng/mL / x     / x     / x     / x          < from: CT Angio Chest PE Protocol w/ IV Cont (11.18.19 @ 18:58) >    IMPRESSION:     No pulmonary embolism.  Marked cardiomegaly.  Mild interstitial lung disease.  Pulmonary hypertension.    < end of copied text >        MEDICATIONS  (STANDING):  acetaZOLAMIDE    Tablet 125 milliGRAM(s) Oral daily  ALBUTerol    90 MICROgram(s) HFA Inhaler 1 Puff(s) Inhalation every 4 hours  albuterol/ipratropium for Nebulization 3 milliLiter(s) Nebulizer every 6 hours  aspirin  chewable 81 milliGRAM(s) Oral daily  budesonide 160 MICROgram(s)/formoterol 4.5 MICROgram(s) Inhaler 2 Puff(s) Inhalation two times a day  chlordiazePOXIDE 5 milliGRAM(s) Oral daily  clopidogrel Tablet 75 milliGRAM(s) Oral daily  diltiazem    milliGRAM(s) Oral daily  ferrous sulfate Oral Tab/Cap - Peds 325 milliGRAM(s) Oral daily  furosemide    Tablet 40 milliGRAM(s) Oral daily  gabapentin 400 milliGRAM(s) Oral three times a day  influenza   Vaccine 0.5 milliLiter(s) IntraMuscular once  ipratropium    for Nebulization 500 MICROGram(s) Nebulizer every 6 hours  pantoprazole   Suspension 40 milliGRAM(s) Oral daily  predniSONE   Tablet 40 milliGRAM(s) Oral two times a day  tamsulosin Oral Tab/Cap - Peds 0.4 milliGRAM(s) Oral at bedtime  tiotropium 18 MICROgram(s) Capsule 1 Capsule(s) Inhalation daily  traZODone 100 milliGRAM(s) Oral at bedtime    MEDICATIONS  (PRN):  ALBUTerol    90 MICROgram(s) HFA Inhaler 2 Puff(s) Inhalation every 4 hours PRN Shortness of Breath            Assessment and Plan:   64y male w/     1. acute on chronic resp failure due to COPD exacerbation  - CTA no pna or PE  - change to iv steroids  - nebs  - VIJAY on cpap  - sputum culture  - smoking cessation discussed (pt quit 8 days ago);  nicotine patch    2. chronic diastolic chf  - stable, continue home meds    3. afib  - s/p watchman device  - cardizem     4. dvt px  heparin sc    doppler RLE to r/o dvt

## 2019-11-20 ENCOUNTER — APPOINTMENT (OUTPATIENT)
Dept: CARDIOLOGY | Facility: CLINIC | Age: 64
End: 2019-11-20

## 2019-11-20 LAB
AMMONIA BLD-MCNC: 56 UMOL/L — HIGH (ref 11–32)
BASE EXCESS BLDA CALC-SCNC: -0.9 MMOL/L — SIGNIFICANT CHANGE UP (ref -2–2)
BLOOD GAS COMMENTS ARTERIAL: SIGNIFICANT CHANGE UP
GAS PNL BLDA: SIGNIFICANT CHANGE UP
HCO3 BLDA-SCNC: 27 MMOL/L — SIGNIFICANT CHANGE UP (ref 21–29)
PCO2 BLDA: 63 MMHG — HIGH (ref 32–46)
PH BLDA: 7.26 — LOW (ref 7.35–7.45)
PO2 BLDA: 80 MMHG — SIGNIFICANT CHANGE UP (ref 74–108)
SAO2 % BLDA: 94 % — SIGNIFICANT CHANGE UP (ref 92–96)

## 2019-11-20 PROCEDURE — 93010 ELECTROCARDIOGRAM REPORT: CPT

## 2019-11-20 RX ORDER — LACTULOSE 10 G/15ML
30 SOLUTION ORAL THREE TIMES A DAY
Refills: 0 | Status: DISCONTINUED | OUTPATIENT
Start: 2019-11-20 | End: 2019-11-21

## 2019-11-20 RX ADMIN — Medication 3 MILLILITER(S): at 08:08

## 2019-11-20 RX ADMIN — TAMSULOSIN HYDROCHLORIDE 0.4 MILLIGRAM(S): 0.4 CAPSULE ORAL at 21:27

## 2019-11-20 RX ADMIN — Medication 1 PATCH: at 11:54

## 2019-11-20 RX ADMIN — Medication 3 MILLILITER(S): at 01:50

## 2019-11-20 RX ADMIN — Medication 40 MILLIGRAM(S): at 05:58

## 2019-11-20 RX ADMIN — ACETAZOLAMIDE 125 MILLIGRAM(S): 250 TABLET ORAL at 11:53

## 2019-11-20 RX ADMIN — Medication 81 MILLIGRAM(S): at 11:25

## 2019-11-20 RX ADMIN — PANTOPRAZOLE SODIUM 40 MILLIGRAM(S): 20 TABLET, DELAYED RELEASE ORAL at 05:59

## 2019-11-20 RX ADMIN — Medication 100 MILLIGRAM(S): at 21:27

## 2019-11-20 RX ADMIN — Medication 600 MILLIGRAM(S): at 18:11

## 2019-11-20 RX ADMIN — GABAPENTIN 400 MILLIGRAM(S): 400 CAPSULE ORAL at 21:27

## 2019-11-20 RX ADMIN — Medication 3 MILLILITER(S): at 20:24

## 2019-11-20 RX ADMIN — LACTULOSE 30 GRAM(S): 10 SOLUTION ORAL at 21:28

## 2019-11-20 RX ADMIN — HEPARIN SODIUM 5000 UNIT(S): 5000 INJECTION INTRAVENOUS; SUBCUTANEOUS at 05:59

## 2019-11-20 RX ADMIN — HEPARIN SODIUM 5000 UNIT(S): 5000 INJECTION INTRAVENOUS; SUBCUTANEOUS at 15:26

## 2019-11-20 RX ADMIN — LACTULOSE 30 GRAM(S): 10 SOLUTION ORAL at 15:28

## 2019-11-20 RX ADMIN — Medication 40 MILLIGRAM(S): at 11:25

## 2019-11-20 RX ADMIN — Medication 40 MILLIGRAM(S): at 18:11

## 2019-11-20 RX ADMIN — Medication 120 MILLIGRAM(S): at 05:59

## 2019-11-20 RX ADMIN — GABAPENTIN 400 MILLIGRAM(S): 400 CAPSULE ORAL at 15:26

## 2019-11-20 RX ADMIN — CLOPIDOGREL BISULFATE 75 MILLIGRAM(S): 75 TABLET, FILM COATED ORAL at 11:25

## 2019-11-20 RX ADMIN — Medication 1 PATCH: at 11:31

## 2019-11-20 RX ADMIN — Medication 325 MILLIGRAM(S): at 11:25

## 2019-11-20 RX ADMIN — Medication 3 MILLILITER(S): at 13:50

## 2019-11-20 RX ADMIN — GABAPENTIN 400 MILLIGRAM(S): 400 CAPSULE ORAL at 05:59

## 2019-11-20 RX ADMIN — Medication 600 MILLIGRAM(S): at 05:59

## 2019-11-20 RX ADMIN — HEPARIN SODIUM 5000 UNIT(S): 5000 INJECTION INTRAVENOUS; SUBCUTANEOUS at 21:27

## 2019-11-20 NOTE — PROGRESS NOTE ADULT - SUBJECTIVE AND OBJECTIVE BOX
cc: sob  hpi: 64y male w/ pmh copd p/w worsening sob, cough.   Still coughing a lot this morning- productive yellow sputum.  Sob w/ cough.  Rt leg discomfort, swelling.    11/20-      REVIEW OF SYSTEMS:  General: No weakness, fevers, chills  HEENT: No HA, neck pain, no visual changes, no hearing loss   Resp: +cough; no wheezing, hemoptysis;  sob w/ cough  CV: No chest pain or palpitations  GI: No abdominal pain, no n/v/d, no blood in stool  : No f/u/d  Neuro: No weakness or numbness  MSK: rt leg pain, redness  All other ROS negative unless indicated above.      Vital Signs Last 24 Hrs  T(C): 36.4 (20 Nov 2019 05:43), Max: 36.8 (19 Nov 2019 19:07)  T(F): 97.6 (20 Nov 2019 05:43), Max: 98.3 (19 Nov 2019 19:07)  HR: 79 (20 Nov 2019 05:43) (79 - 100)  BP: 117/66 (20 Nov 2019 05:43) (117/66 - 134/60)  BP(mean): --  RR: 18 (20 Nov 2019 05:43) (17 - 18)  SpO2: 95% (20 Nov 2019 05:43) (88% - 96%)  T(C): 36.6 (19 Nov 2019 04:01), Max: 36.9 (18 Nov 2019 22:40)  T(F): 97.9 (19 Nov 2019 04:01), Max: 98.4 (18 Nov 2019 22:40)        PHYSICAL EXAM:    General: NAD, comfortable  Neuro: AAOx3, no focal deficits  HEENT: NCAT, EOMI  Neck: Soft and supple, No JVD  Respiratory: decreased airway movement b/l; no w/r/r  Cardiovascular: S1 and S2, RRR  Gastrointestinal: +BS, soft, NTND  Extremities: RLE edema, erythema, warm; LLE bka  Musculoskeletal: strength 5/5       LABS: All Labs Reviewed:                        13.0   6.40  )-----------( 148      ( 18 Nov 2019 16:55 )             44.1     11-18    139  |  105  |  18  ----------------------------<  123<H>  4.1   |  30  |  0.99    Ca    8.8      18 Nov 2019 16:55  Mg     1.6     11-18    TPro  7.5  /  Alb  3.5  /  TBili  0.5  /  DBili  0.2  /  AST  12<L>  /  ALT  13  /  AlkPhos  152<H>  11-18    PT/INR - ( 18 Nov 2019 16:55 )   PT: 12.7 sec;   INR: 1.14 ratio         PTT - ( 18 Nov 2019 16:55 )  PTT:26.9 sec  CARDIAC MARKERS ( 18 Nov 2019 20:18 )  <0.015 ng/mL / x     / x     / x     / x      CARDIAC MARKERS ( 18 Nov 2019 16:55 )  <0.015 ng/mL / x     / x     / x     / x          < from: CT Angio Chest PE Protocol w/ IV Cont (11.18.19 @ 18:58) >    IMPRESSION:     No pulmonary embolism.  Marked cardiomegaly.  Mild interstitial lung disease.  Pulmonary hypertension.    < end of copied text >        MEDICATIONS  (STANDING):  acetaZOLAMIDE    Tablet 125 milliGRAM(s) Oral daily  ALBUTerol    90 MICROgram(s) HFA Inhaler 1 Puff(s) Inhalation every 4 hours  albuterol/ipratropium for Nebulization 3 milliLiter(s) Nebulizer every 6 hours  aspirin  chewable 81 milliGRAM(s) Oral daily  budesonide 160 MICROgram(s)/formoterol 4.5 MICROgram(s) Inhaler 2 Puff(s) Inhalation two times a day  chlordiazePOXIDE 5 milliGRAM(s) Oral daily  clopidogrel Tablet 75 milliGRAM(s) Oral daily  diltiazem    milliGRAM(s) Oral daily  ferrous sulfate Oral Tab/Cap - Peds 325 milliGRAM(s) Oral daily  furosemide    Tablet 40 milliGRAM(s) Oral daily  gabapentin 400 milliGRAM(s) Oral three times a day  influenza   Vaccine 0.5 milliLiter(s) IntraMuscular once  ipratropium    for Nebulization 500 MICROGram(s) Nebulizer every 6 hours  pantoprazole   Suspension 40 milliGRAM(s) Oral daily  predniSONE   Tablet 40 milliGRAM(s) Oral two times a day  tamsulosin Oral Tab/Cap - Peds 0.4 milliGRAM(s) Oral at bedtime  tiotropium 18 MICROgram(s) Capsule 1 Capsule(s) Inhalation daily  traZODone 100 milliGRAM(s) Oral at bedtime    MEDICATIONS  (PRN):  ALBUTerol    90 MICROgram(s) HFA Inhaler 2 Puff(s) Inhalation every 4 hours PRN Shortness of Breath            Assessment and Plan:   64y male w/     1. acute on chronic resp failure due to COPD exacerbation  - CTA no pna or PE  - IV steroids  - nebs  - VIJAY on cpap  - sputum culture pending   - check O2 sats on room air   - smoking cessation discussed (pt quit 8 days ago);  nicotine patch    2. chronic diastolic chf  - stable, continue home meds    3. afib  - s/p watchman device  - cardizem     4. dvt px  heparin sc    doppler RLE no dvt cc: sob  hpi: 64y male w/ pmh copd p/w worsening sob, cough.   Still coughing a lot this morning- productive yellow sputum.  Sob w/ cough.  Rt leg discomfort, swelling.    11/20-called b/c pt lethargic.  Pt seen at bedside- c/o feeling very tired and not sleeping well.  Discussed recent admission at other [facility] in Sept recommended cpap qhs for VIJAY.  Pt states he never started it.  No cp, sob, palp.  States cough and wheeze improving.   Again discussed etoh use and pt denies daily use- reports 3-4 drinks per week.       REVIEW OF SYSTEMS: as per hpi, other 10 point ros negative    Vital Signs Last 24 Hrs  T(C): 36.4 (20 Nov 2019 05:43), Max: 36.8 (19 Nov 2019 19:07)  T(F): 97.6 (20 Nov 2019 05:43), Max: 98.3 (19 Nov 2019 19:07)  HR: 79 (20 Nov 2019 05:43) (79 - 100)  BP: 117/66 (20 Nov 2019 05:43) (117/66 - 134/60)  BP(mean): --  RR: 18 (20 Nov 2019 05:43) (17 - 18)  SpO2: 95% (20 Nov 2019 05:43) (88% - 96%)  T(C): 36.6 (19 Nov 2019 04:01), Max: 36.9 (18 Nov 2019 22:40)  T(F): 97.9 (19 Nov 2019 04:01), Max: 98.4 (18 Nov 2019 22:40)    PHYSICAL EXAM:  General: NAD, comfortable- seated in bed  Neuro: lethargic but seated and conversing, AAOX3, no focal deficits  HEENT: NCAT, EOMI  Neck: Soft and supple  Respiratory: decreased airway movement b/l; no w/r/r  Cardiovascular: S1 and S2, RRR  Gastrointestinal: +BS, soft, NTND  Extremities: RLE edema, erythema, warm; LLE bka  Musculoskeletal: strength 5/5           LABS: All Labs Reviewed:                        12.8   5.10  )-----------( 166      ( 19 Nov 2019 08:10 )             42.0     11-19    136  |  104  |  16  ----------------------------<  180<H>  4.1   |  28  |  0.90    Ca    8.8      19 Nov 2019 08:10  Mg     1.8     11-19    TPro  7.4  /  Alb  3.3  /  TBili  0.5  /  DBili  x   /  AST  13<L>  /  ALT  14  /  AlkPhos  137<H>  11-19    PT/INR - ( 18 Nov 2019 16:55 )   PT: 12.7 sec;   INR: 1.14 ratio         PTT - ( 18 Nov 2019 16:55 )  PTT:26.9 sec  CARDIAC MARKERS ( 18 Nov 2019 20:18 )  <0.015 ng/mL / x     / x     / x     / x      CARDIAC MARKERS ( 18 Nov 2019 16:55 )  <0.015 ng/mL / x     / x     / x     / x          Culture - Sputum . (11.19.19 @ 14:50)    Gram Stain:   Rare polymorphonuclear leukocytes per low power field  Few Squamous epithelial cells per low power field  Moderate Gram Variable Cocci per oil power field  Few Gram Negative Rods per oil power field    Specimen Source: .Sputum Sputum        < from: CT Angio Chest PE Protocol w/ IV Cont (11.18.19 @ 18:58) >    IMPRESSION:     No pulmonary embolism.  Marked cardiomegaly.  Mild interstitial lung disease.  Pulmonary hypertension.    < end of copied text >        MEDICATIONS  (STANDING):  acetaZOLAMIDE    Tablet 125 milliGRAM(s) Oral daily  ALBUTerol    90 MICROgram(s) HFA Inhaler 1 Puff(s) Inhalation every 4 hours  albuterol/ipratropium for Nebulization 3 milliLiter(s) Nebulizer every 6 hours  aspirin  chewable 81 milliGRAM(s) Oral daily  budesonide 160 MICROgram(s)/formoterol 4.5 MICROgram(s) Inhaler 2 Puff(s) Inhalation two times a day  clopidogrel Tablet 75 milliGRAM(s) Oral daily  diltiazem    milliGRAM(s) Oral daily  ferrous sulfate Oral Tab/Cap - Peds 325 milliGRAM(s) Oral daily  furosemide    Tablet 40 milliGRAM(s) Oral daily  gabapentin 400 milliGRAM(s) Oral three times a day  guaiFENesin  milliGRAM(s) Oral every 12 hours  heparin  Injectable 5000 Unit(s) SubCutaneous every 8 hours  influenza   Vaccine 0.5 milliLiter(s) IntraMuscular once  lactulose Syrup 30 Gram(s) Oral three times a day  methylPREDNISolone sodium succinate Injectable 40 milliGRAM(s) IV Push two times a day  nicotine - 21 mG/24Hr(s) Patch 1 patch Transdermal daily  pantoprazole    Tablet 40 milliGRAM(s) Oral before breakfast  tamsulosin Oral Tab/Cap - Peds 0.4 milliGRAM(s) Oral at bedtime  tiotropium 18 MICROgram(s) Capsule 1 Capsule(s) Inhalation daily  traZODone 100 milliGRAM(s) Oral at bedtime    MEDICATIONS  (PRN):  acetaminophen   Tablet .. 650 milliGRAM(s) Oral every 6 hours PRN Temp greater or equal to 38C (100.4F), Mild Pain (1 - 3)  ALBUTerol    90 MICROgram(s) HFA Inhaler 2 Puff(s) Inhalation every 4 hours PRN Shortness of Breath              Assessment and Plan:   64y male w/     1. acute on chronic hypercapneic respiratory failure due to COPD exacerbation  - CTA no pna or PE  - IV steroids  - nebs  - VIJAY on cpap  - sputum culture pending;  no fevers, no pna - no need for abx  - check O2 sats on room air   - smoking cessation discussed (pt quit 8 days ago);  nicotine patch  11/20- more lethargic, hx vijay and not using cpap, abg respiratory acidosis,  start bipap, discussed w/ Pulm /consulted    2. chronic diastolic chf  - stable, continue home meds    3. afib  - s/p watchman device  - cardizem     4. dvt px  heparin sc    doppler RLE no dvt     5. hyperammonemia  - CT 5/26/19 reviewed, questionable cirrhosis and pt was due for GI f/u   - lactulose ordered, will repeat level in am    6. VIJAY  - start NIPPV qhs

## 2019-11-21 LAB
ADD ON TEST-SPECIMEN IN LAB: SIGNIFICANT CHANGE UP
ALBUMIN SERPL ELPH-MCNC: 3.3 G/DL — SIGNIFICANT CHANGE UP (ref 3.3–5)
ALP SERPL-CCNC: 133 U/L — HIGH (ref 40–120)
ALT FLD-CCNC: 51 U/L — SIGNIFICANT CHANGE UP (ref 12–78)
ANION GAP SERPL CALC-SCNC: 4 MMOL/L — LOW (ref 5–17)
AST SERPL-CCNC: 34 U/L — SIGNIFICANT CHANGE UP (ref 15–37)
BASE EXCESS BLDA CALC-SCNC: -0.5 MMOL/L — SIGNIFICANT CHANGE UP (ref -2–2)
BILIRUB SERPL-MCNC: 0.4 MG/DL — SIGNIFICANT CHANGE UP (ref 0.2–1.2)
BUN SERPL-MCNC: 25 MG/DL — HIGH (ref 7–23)
CALCIUM SERPL-MCNC: 8.7 MG/DL — SIGNIFICANT CHANGE UP (ref 8.5–10.1)
CHLORIDE SERPL-SCNC: 104 MMOL/L — SIGNIFICANT CHANGE UP (ref 96–108)
CO2 SERPL-SCNC: 30 MMOL/L — SIGNIFICANT CHANGE UP (ref 22–31)
CREAT SERPL-MCNC: 0.96 MG/DL — SIGNIFICANT CHANGE UP (ref 0.5–1.3)
CULTURE RESULTS: SIGNIFICANT CHANGE UP
GAS PNL BLDA: SIGNIFICANT CHANGE UP
GLUCOSE SERPL-MCNC: 157 MG/DL — HIGH (ref 70–99)
HCO3 BLDA-SCNC: 27 MMOL/L — SIGNIFICANT CHANGE UP (ref 21–29)
PCO2 BLDA: 64 MMHG — HIGH (ref 32–46)
PH BLDA: 7.26 — LOW (ref 7.35–7.45)
PO2 BLDA: 95 MMHG — SIGNIFICANT CHANGE UP (ref 74–108)
POTASSIUM SERPL-MCNC: 4.2 MMOL/L — SIGNIFICANT CHANGE UP (ref 3.5–5.3)
POTASSIUM SERPL-SCNC: 4.2 MMOL/L — SIGNIFICANT CHANGE UP (ref 3.5–5.3)
PROT SERPL-MCNC: 7 GM/DL — SIGNIFICANT CHANGE UP (ref 6–8.3)
SAO2 % BLDA: 96 % — SIGNIFICANT CHANGE UP (ref 92–96)
SODIUM SERPL-SCNC: 138 MMOL/L — SIGNIFICANT CHANGE UP (ref 135–145)
SPECIMEN SOURCE: SIGNIFICANT CHANGE UP

## 2019-11-21 PROCEDURE — 93010 ELECTROCARDIOGRAM REPORT: CPT

## 2019-11-21 RX ADMIN — CLOPIDOGREL BISULFATE 75 MILLIGRAM(S): 75 TABLET, FILM COATED ORAL at 11:52

## 2019-11-21 RX ADMIN — Medication 650 MILLIGRAM(S): at 15:09

## 2019-11-21 RX ADMIN — GABAPENTIN 400 MILLIGRAM(S): 400 CAPSULE ORAL at 21:16

## 2019-11-21 RX ADMIN — Medication 40 MILLIGRAM(S): at 11:52

## 2019-11-21 RX ADMIN — PANTOPRAZOLE SODIUM 40 MILLIGRAM(S): 20 TABLET, DELAYED RELEASE ORAL at 05:15

## 2019-11-21 RX ADMIN — Medication 100 MILLIGRAM(S): at 21:17

## 2019-11-21 RX ADMIN — Medication 1 PATCH: at 19:23

## 2019-11-21 RX ADMIN — Medication 650 MILLIGRAM(S): at 16:00

## 2019-11-21 RX ADMIN — HEPARIN SODIUM 5000 UNIT(S): 5000 INJECTION INTRAVENOUS; SUBCUTANEOUS at 21:16

## 2019-11-21 RX ADMIN — Medication 325 MILLIGRAM(S): at 11:52

## 2019-11-21 RX ADMIN — GABAPENTIN 400 MILLIGRAM(S): 400 CAPSULE ORAL at 14:54

## 2019-11-21 RX ADMIN — Medication 1 PATCH: at 11:53

## 2019-11-21 RX ADMIN — LACTULOSE 30 GRAM(S): 10 SOLUTION ORAL at 05:14

## 2019-11-21 RX ADMIN — TAMSULOSIN HYDROCHLORIDE 0.4 MILLIGRAM(S): 0.4 CAPSULE ORAL at 21:16

## 2019-11-21 RX ADMIN — Medication 3 MILLILITER(S): at 20:00

## 2019-11-21 RX ADMIN — Medication 3 MILLILITER(S): at 08:30

## 2019-11-21 RX ADMIN — Medication 120 MILLIGRAM(S): at 05:13

## 2019-11-21 RX ADMIN — Medication 600 MILLIGRAM(S): at 17:53

## 2019-11-21 RX ADMIN — ACETAZOLAMIDE 125 MILLIGRAM(S): 250 TABLET ORAL at 11:53

## 2019-11-21 RX ADMIN — HEPARIN SODIUM 5000 UNIT(S): 5000 INJECTION INTRAVENOUS; SUBCUTANEOUS at 05:14

## 2019-11-21 RX ADMIN — Medication 3 MILLILITER(S): at 14:08

## 2019-11-21 RX ADMIN — INFLUENZA VIRUS VACCINE 0.5 MILLILITER(S): 15; 15; 15; 15 SUSPENSION INTRAMUSCULAR at 14:53

## 2019-11-21 RX ADMIN — Medication 81 MILLIGRAM(S): at 11:52

## 2019-11-21 RX ADMIN — Medication 600 MILLIGRAM(S): at 05:13

## 2019-11-21 RX ADMIN — Medication 3 MILLILITER(S): at 01:29

## 2019-11-21 RX ADMIN — GABAPENTIN 400 MILLIGRAM(S): 400 CAPSULE ORAL at 05:14

## 2019-11-21 RX ADMIN — Medication 1 PATCH: at 12:39

## 2019-11-21 RX ADMIN — Medication 40 MILLIGRAM(S): at 21:16

## 2019-11-21 RX ADMIN — Medication 40 MILLIGRAM(S): at 05:14

## 2019-11-21 RX ADMIN — HEPARIN SODIUM 5000 UNIT(S): 5000 INJECTION INTRAVENOUS; SUBCUTANEOUS at 14:54

## 2019-11-21 NOTE — CONSULT NOTE ADULT - ASSESSMENT
PROBLEMS;    Ac on chronic hypercapnic & hypoxamic respiratory failure  OHS/VIJAY  AC flare of COPD/chronic vs acute on chronic bronchitis  Mild interstitial lung disease-NSIP h/o smoking  Pulmonary hypertension  Chronic afib    PLAN;    iv solumedrols 40mg q12hr  aerosols  bipap qhs  Need sleep study as outpat & pap titration  supportive care  nicotine patch  weight lost  dvt prophylasix

## 2019-11-21 NOTE — PROGRESS NOTE ADULT - SUBJECTIVE AND OBJECTIVE BOX
cc: sob  hpi: 64y male w/ pmh copd p/w worsening sob, cough.   Still coughing a lot this morning- productive yellow sputum.  Sob w/ cough.  Rt leg discomfort, swelling.    11/20-called b/c pt lethargic.  Pt seen at bedside- c/o feeling very tired and not sleeping well.  Discussed recent admission at other [facility] in Sept recommended cpap qhs for VIJAY.  Pt states he never started it.  No cp, sob, palp.  States cough and wheeze improving.   Again discussed etoh use and pt denies daily use- reports 3-4 drinks per week.     11/21- denies sob,  Still coughing but not productive.  Hears himself wheezing at times.       REVIEW OF SYSTEMS: as per hpi, other 10 point ros negative    Vital Signs Last 24 Hrs  T(C): 36.6 (21 Nov 2019 11:27), Max: 36.6 (21 Nov 2019 11:27)  T(F): 97.8 (21 Nov 2019 11:27), Max: 97.8 (21 Nov 2019 11:27)  HR: 76 (21 Nov 2019 14:17) (76 - 110)  BP: 122/69 (21 Nov 2019 11:27) (111/58 - 127/62)  BP(mean): --  RR: 19 (21 Nov 2019 11:27) (19 - 20)  SpO2: 97% (21 Nov 2019 14:17) (91% - 97%) on NC    PHYSICAL EXAM:  General: NAD, comfortable- seated in bed  Neuro: AAOX3, no focal deficits  HEENT: NCAT, EOMI  Neck: Soft and supple  Respiratory: decreased airway movement b/l; no w/r/r  Cardiovascular: S1 and S2, RRR  Gastrointestinal: +BS, soft, NTND  Extremities: RLE edema, erythema, warm; LLE bka  Musculoskeletal: strength 5/5           LABS: All Labs Reviewed:                        12.8   5.10  )-----------( 166      ( 19 Nov 2019 08:10 )             42.0     11-19    136  |  104  |  16  ----------------------------<  180<H>  4.1   |  28  |  0.90    Ca    8.8      19 Nov 2019 08:10  Mg     1.8     11-19    TPro  7.4  /  Alb  3.3  /  TBili  0.5  /  DBili  x   /  AST  13<L>  /  ALT  14  /  AlkPhos  137<H>  11-19    PT/INR - ( 18 Nov 2019 16:55 )   PT: 12.7 sec;   INR: 1.14 ratio         PTT - ( 18 Nov 2019 16:55 )  PTT:26.9 sec  CARDIAC MARKERS ( 18 Nov 2019 20:18 )  <0.015 ng/mL / x     / x     / x     / x      CARDIAC MARKERS ( 18 Nov 2019 16:55 )  <0.015 ng/mL / x     / x     / x     / x          Culture - Sputum . (11.19.19 @ 14:50)    Gram Stain:   Rare polymorphonuclear leukocytes per low power field  Few Squamous epithelial cells per low power field  Moderate Gram Variable Cocci per oil power field  Few Gram Negative Rods per oil power field    Specimen Source: .Sputum Sputum        < from: CT Angio Chest PE Protocol w/ IV Cont (11.18.19 @ 18:58) >    IMPRESSION:     No pulmonary embolism.  Marked cardiomegaly.  Mild interstitial lung disease.  Pulmonary hypertension.    < end of copied text >        MEDICATIONS  (STANDING):  acetaZOLAMIDE    Tablet 125 milliGRAM(s) Oral daily  ALBUTerol    90 MICROgram(s) HFA Inhaler 1 Puff(s) Inhalation every 4 hours  albuterol/ipratropium for Nebulization 3 milliLiter(s) Nebulizer every 6 hours  aspirin  chewable 81 milliGRAM(s) Oral daily  budesonide 160 MICROgram(s)/formoterol 4.5 MICROgram(s) Inhaler 2 Puff(s) Inhalation two times a day  clopidogrel Tablet 75 milliGRAM(s) Oral daily  diltiazem    milliGRAM(s) Oral daily  ferrous sulfate Oral Tab/Cap - Peds 325 milliGRAM(s) Oral daily  furosemide    Tablet 40 milliGRAM(s) Oral daily  gabapentin 400 milliGRAM(s) Oral three times a day  guaiFENesin  milliGRAM(s) Oral every 12 hours  heparin  Injectable 5000 Unit(s) SubCutaneous every 8 hours  methylPREDNISolone sodium succinate Injectable 40 milliGRAM(s) IV Push two times a day  nicotine - 21 mG/24Hr(s) Patch 1 patch Transdermal daily  pantoprazole    Tablet 40 milliGRAM(s) Oral before breakfast  tamsulosin Oral Tab/Cap - Peds 0.4 milliGRAM(s) Oral at bedtime  tiotropium 18 MICROgram(s) Capsule 1 Capsule(s) Inhalation daily  traZODone 100 milliGRAM(s) Oral at bedtime    MEDICATIONS  (PRN):  acetaminophen   Tablet .. 650 milliGRAM(s) Oral every 6 hours PRN Temp greater or equal to 38C (100.4F), Mild Pain (1 - 3)  ALBUTerol    90 MICROgram(s) HFA Inhaler 2 Puff(s) Inhalation every 4 hours PRN Shortness of Breath              Assessment and Plan:   64y male w/     1. acute on chronic hypercapneic respiratory failure due to COPD exacerbation  - CTA no pna or PE  - IV steroids  - nebs  - VIJAY on cpap  - sputum culture pending;  no fevers, no pna - no need for abx  - check O2 sats on room air   - smoking cessation discussed (pt quit 8 days ago);  nicotine patch  11/20- more lethargic, hx vijay and not using cpap, abg respiratory acidosis,  start bipap, discussed w/ Pulm /consulted  11/21- Pulm eval appreciated.   increase steroids     2. chronic diastolic chf  - stable, continue home meds    3. afib  - s/p watchman device  - cardizem     4. dvt px  heparin sc    doppler RLE no dvt     5. hyperammonemia  - CT 5/26/19 reviewed, questionable cirrhosis and pt was due for GI f/u   - lactulose ordered, will repeat level in am--> normal.   Discussed this and CT w/ patient .  He has new GI doc that he will follow up with - reports having recent normal colonoscopy at [place]     6. VIJAY  - start NIPPV qhs cc: sob  hpi: 64y male w/ pmh copd p/w worsening sob, cough.   Still coughing a lot this morning- productive yellow sputum.  Sob w/ cough.  Rt leg discomfort, swelling.    11/20-called b/c pt lethargic.  Pt seen at bedside- c/o feeling very tired and not sleeping well.  Discussed recent admission at other [facility] in Sept recommended cpap qhs for VIJAY.  Pt states he never started it.  No cp, sob, palp.  States cough and wheeze improving.   Again discussed etoh use and pt denies daily use- reports 3-4 drinks per week.     11/21- denies sob,  Still coughing but not productive.  Hears himself wheezing at times.       REVIEW OF SYSTEMS: as per hpi, other 10 point ros negative    Vital Signs Last 24 Hrs  T(C): 36.6 (21 Nov 2019 11:27), Max: 36.6 (21 Nov 2019 11:27)  T(F): 97.8 (21 Nov 2019 11:27), Max: 97.8 (21 Nov 2019 11:27)  HR: 76 (21 Nov 2019 14:17) (76 - 110)  BP: 122/69 (21 Nov 2019 11:27) (111/58 - 127/62)  BP(mean): --  RR: 19 (21 Nov 2019 11:27) (19 - 20)  SpO2: 97% (21 Nov 2019 14:17) (91% - 97%) on NC    PHYSICAL EXAM:  General: NAD, comfortable- seated in bed  Neuro: AAOX3, no focal deficits  HEENT: NCAT, EOMI  Neck: Soft and supple  Respiratory: expiratory wheezing b/l   Cardiovascular: S1 and S2, RRR  Gastrointestinal: +BS, soft, NTND  Extremities: RLE edema, erythema, warm; LLE bka  Musculoskeletal: strength 5/5           LABS: All Labs Reviewed:                        12.8   5.10  )-----------( 166      ( 19 Nov 2019 08:10 )             42.0     11-19    136  |  104  |  16  ----------------------------<  180<H>  4.1   |  28  |  0.90    Ca    8.8      19 Nov 2019 08:10  Mg     1.8     11-19    TPro  7.4  /  Alb  3.3  /  TBili  0.5  /  DBili  x   /  AST  13<L>  /  ALT  14  /  AlkPhos  137<H>  11-19    PT/INR - ( 18 Nov 2019 16:55 )   PT: 12.7 sec;   INR: 1.14 ratio         PTT - ( 18 Nov 2019 16:55 )  PTT:26.9 sec  CARDIAC MARKERS ( 18 Nov 2019 20:18 )  <0.015 ng/mL / x     / x     / x     / x      CARDIAC MARKERS ( 18 Nov 2019 16:55 )  <0.015 ng/mL / x     / x     / x     / x          Culture - Sputum . (11.19.19 @ 14:50)    Gram Stain:   Rare polymorphonuclear leukocytes per low power field  Few Squamous epithelial cells per low power field  Moderate Gram Variable Cocci per oil power field  Few Gram Negative Rods per oil power field    Specimen Source: .Sputum Sputum        < from: CT Angio Chest PE Protocol w/ IV Cont (11.18.19 @ 18:58) >    IMPRESSION:     No pulmonary embolism.  Marked cardiomegaly.  Mild interstitial lung disease.  Pulmonary hypertension.    < end of copied text >        MEDICATIONS  (STANDING):  acetaZOLAMIDE    Tablet 125 milliGRAM(s) Oral daily  ALBUTerol    90 MICROgram(s) HFA Inhaler 1 Puff(s) Inhalation every 4 hours  albuterol/ipratropium for Nebulization 3 milliLiter(s) Nebulizer every 6 hours  aspirin  chewable 81 milliGRAM(s) Oral daily  budesonide 160 MICROgram(s)/formoterol 4.5 MICROgram(s) Inhaler 2 Puff(s) Inhalation two times a day  clopidogrel Tablet 75 milliGRAM(s) Oral daily  diltiazem    milliGRAM(s) Oral daily  ferrous sulfate Oral Tab/Cap - Peds 325 milliGRAM(s) Oral daily  furosemide    Tablet 40 milliGRAM(s) Oral daily  gabapentin 400 milliGRAM(s) Oral three times a day  guaiFENesin  milliGRAM(s) Oral every 12 hours  heparin  Injectable 5000 Unit(s) SubCutaneous every 8 hours  methylPREDNISolone sodium succinate Injectable 40 milliGRAM(s) IV Push two times a day  nicotine - 21 mG/24Hr(s) Patch 1 patch Transdermal daily  pantoprazole    Tablet 40 milliGRAM(s) Oral before breakfast  tamsulosin Oral Tab/Cap - Peds 0.4 milliGRAM(s) Oral at bedtime  tiotropium 18 MICROgram(s) Capsule 1 Capsule(s) Inhalation daily  traZODone 100 milliGRAM(s) Oral at bedtime    MEDICATIONS  (PRN):  acetaminophen   Tablet .. 650 milliGRAM(s) Oral every 6 hours PRN Temp greater or equal to 38C (100.4F), Mild Pain (1 - 3)  ALBUTerol    90 MICROgram(s) HFA Inhaler 2 Puff(s) Inhalation every 4 hours PRN Shortness of Breath              Assessment and Plan:   64y male w/     1. acute on chronic hypercapneic respiratory failure due to COPD exacerbation  - CTA no pna or PE  - IV steroids  - nebs  - VIJAY on cpap  - sputum culture pending;  no fevers, no pna - no need for abx  - check O2 sats on room air   - smoking cessation discussed (pt quit 8 days ago);  nicotine patch  11/20- more lethargic, hx vijay and not using cpap, abg respiratory acidosis,  start bipap, discussed w/ Pulm /consulted  11/21- Pulm eval appreciated.   increase steroids     2. chronic diastolic chf  - stable, continue home meds    3. afib  - s/p watchman device  - cardizem     4. dvt px  heparin sc    doppler RLE no dvt     5. hyperammonemia  - CT 5/26/19 reviewed, questionable cirrhosis and pt was due for GI f/u   - lactulose ordered, will repeat level in am--> normal.   Discussed this and CT w/ patient .  He has new GI doc that he will follow up with - reports having recent normal colonoscopy at [place]     6. VIJAY  - start NIPPV qhs

## 2019-11-22 LAB
ALBUMIN SERPL ELPH-MCNC: 3.1 G/DL — LOW (ref 3.3–5)
ALP SERPL-CCNC: 136 U/L — HIGH (ref 40–120)
ALT FLD-CCNC: 67 U/L — SIGNIFICANT CHANGE UP (ref 12–78)
ANION GAP SERPL CALC-SCNC: 4 MMOL/L — LOW (ref 5–17)
AST SERPL-CCNC: 39 U/L — HIGH (ref 15–37)
BILIRUB SERPL-MCNC: 0.5 MG/DL — SIGNIFICANT CHANGE UP (ref 0.2–1.2)
BUN SERPL-MCNC: 26 MG/DL — HIGH (ref 7–23)
CALCIUM SERPL-MCNC: 8.9 MG/DL — SIGNIFICANT CHANGE UP (ref 8.5–10.1)
CHLORIDE SERPL-SCNC: 102 MMOL/L — SIGNIFICANT CHANGE UP (ref 96–108)
CO2 SERPL-SCNC: 30 MMOL/L — SIGNIFICANT CHANGE UP (ref 22–31)
CREAT SERPL-MCNC: 0.87 MG/DL — SIGNIFICANT CHANGE UP (ref 0.5–1.3)
GLUCOSE SERPL-MCNC: 147 MG/DL — HIGH (ref 70–99)
NT-PROBNP SERPL-SCNC: 1451 PG/ML — HIGH (ref 0–125)
POTASSIUM SERPL-MCNC: 4 MMOL/L — SIGNIFICANT CHANGE UP (ref 3.5–5.3)
POTASSIUM SERPL-SCNC: 4 MMOL/L — SIGNIFICANT CHANGE UP (ref 3.5–5.3)
PROT SERPL-MCNC: 6.7 GM/DL — SIGNIFICANT CHANGE UP (ref 6–8.3)
SODIUM SERPL-SCNC: 136 MMOL/L — SIGNIFICANT CHANGE UP (ref 135–145)

## 2019-11-22 RX ORDER — FUROSEMIDE 40 MG
40 TABLET ORAL ONCE
Refills: 0 | Status: COMPLETED | OUTPATIENT
Start: 2019-11-22 | End: 2019-11-22

## 2019-11-22 RX ORDER — DILTIAZEM HCL 120 MG
180 CAPSULE, EXT RELEASE 24 HR ORAL DAILY
Refills: 0 | Status: DISCONTINUED | OUTPATIENT
Start: 2019-11-23 | End: 2019-11-26

## 2019-11-22 RX ORDER — FUROSEMIDE 40 MG
40 TABLET ORAL
Refills: 0 | Status: DISCONTINUED | OUTPATIENT
Start: 2019-11-22 | End: 2019-11-24

## 2019-11-22 RX ORDER — DILTIAZEM HCL 120 MG
60 CAPSULE, EXT RELEASE 24 HR ORAL ONCE
Refills: 0 | Status: COMPLETED | OUTPATIENT
Start: 2019-11-22 | End: 2019-11-22

## 2019-11-22 RX ORDER — METOPROLOL TARTRATE 50 MG
25 TABLET ORAL DAILY
Refills: 0 | Status: DISCONTINUED | OUTPATIENT
Start: 2019-11-22 | End: 2019-11-22

## 2019-11-22 RX ADMIN — Medication 100 MILLIGRAM(S): at 17:56

## 2019-11-22 RX ADMIN — Medication 40 MILLIGRAM(S): at 21:05

## 2019-11-22 RX ADMIN — GABAPENTIN 400 MILLIGRAM(S): 400 CAPSULE ORAL at 15:01

## 2019-11-22 RX ADMIN — Medication 40 MILLIGRAM(S): at 15:00

## 2019-11-22 RX ADMIN — HEPARIN SODIUM 5000 UNIT(S): 5000 INJECTION INTRAVENOUS; SUBCUTANEOUS at 15:00

## 2019-11-22 RX ADMIN — Medication 120 MILLIGRAM(S): at 05:07

## 2019-11-22 RX ADMIN — Medication 1 PATCH: at 15:00

## 2019-11-22 RX ADMIN — Medication 40 MILLIGRAM(S): at 05:06

## 2019-11-22 RX ADMIN — PANTOPRAZOLE SODIUM 40 MILLIGRAM(S): 20 TABLET, DELAYED RELEASE ORAL at 05:07

## 2019-11-22 RX ADMIN — Medication 81 MILLIGRAM(S): at 12:09

## 2019-11-22 RX ADMIN — Medication 3 MILLILITER(S): at 08:17

## 2019-11-22 RX ADMIN — Medication 100 MILLIGRAM(S): at 21:06

## 2019-11-22 RX ADMIN — Medication 1 PATCH: at 19:51

## 2019-11-22 RX ADMIN — Medication 650 MILLIGRAM(S): at 22:00

## 2019-11-22 RX ADMIN — Medication 600 MILLIGRAM(S): at 17:56

## 2019-11-22 RX ADMIN — Medication 650 MILLIGRAM(S): at 21:06

## 2019-11-22 RX ADMIN — Medication 1 PATCH: at 17:31

## 2019-11-22 RX ADMIN — ACETAZOLAMIDE 125 MILLIGRAM(S): 250 TABLET ORAL at 12:09

## 2019-11-22 RX ADMIN — Medication 40 MILLIGRAM(S): at 12:18

## 2019-11-22 RX ADMIN — HEPARIN SODIUM 5000 UNIT(S): 5000 INJECTION INTRAVENOUS; SUBCUTANEOUS at 05:06

## 2019-11-22 RX ADMIN — Medication 60 MILLIGRAM(S): at 09:44

## 2019-11-22 RX ADMIN — GABAPENTIN 400 MILLIGRAM(S): 400 CAPSULE ORAL at 05:07

## 2019-11-22 RX ADMIN — HEPARIN SODIUM 5000 UNIT(S): 5000 INJECTION INTRAVENOUS; SUBCUTANEOUS at 21:05

## 2019-11-22 RX ADMIN — Medication 3 MILLILITER(S): at 19:35

## 2019-11-22 RX ADMIN — GABAPENTIN 400 MILLIGRAM(S): 400 CAPSULE ORAL at 21:05

## 2019-11-22 RX ADMIN — TAMSULOSIN HYDROCHLORIDE 0.4 MILLIGRAM(S): 0.4 CAPSULE ORAL at 21:05

## 2019-11-22 RX ADMIN — CLOPIDOGREL BISULFATE 75 MILLIGRAM(S): 75 TABLET, FILM COATED ORAL at 12:09

## 2019-11-22 RX ADMIN — Medication 325 MILLIGRAM(S): at 12:09

## 2019-11-22 RX ADMIN — Medication 600 MILLIGRAM(S): at 05:07

## 2019-11-22 RX ADMIN — Medication 40 MILLIGRAM(S): at 14:59

## 2019-11-22 RX ADMIN — Medication 3 MILLILITER(S): at 01:06

## 2019-11-22 RX ADMIN — Medication 1 PATCH: at 12:30

## 2019-11-22 RX ADMIN — Medication 3 MILLILITER(S): at 14:10

## 2019-11-22 NOTE — PROGRESS NOTE ADULT - SUBJECTIVE AND OBJECTIVE BOX
Subjective:    pat better, sitting in chair, used bipap last night for three hours.    Home Medications:  albuterol 2.5 mg/3 mL (0.083%) inhalation solution: 3 milliliter(s) inhaled every 6 hours, As Needed -for shortness of breath and/or wheezing (18 Nov 2019 22:02)  dilTIAZem 24 hour extended release: 1 tab(s) orally once a day  **pt is unsure if its 120 or 240 mg** (18 Nov 2019 22:02)  furosemide 40 mg oral tablet: 1 tab(s) orally once a day (18 Nov 2019 22:02)  gabapentin 400 mg oral capsule: 1 cap(s) orally 3 times a day (18 Nov 2019 22:02)  pantoprazole 40 mg oral granule, enteric coated: 40 milligram(s) orally once a day (18 Nov 2019 22:02)  Spiriva 18 mcg inhalation capsule: 1 cap(s) inhaled once a day (18 Nov 2019 22:02)  tamsulosin 0.4 mg oral capsule: 1 cap(s) orally once a day (at bedtime) (18 Nov 2019 22:02)  traZODone 50 mg oral tablet: 2 tab(s) orally once a day (at bedtime), As Needed (18 Nov 2019 22:02)    MEDICATIONS  (STANDING):  acetaZOLAMIDE    Tablet 125 milliGRAM(s) Oral daily  ALBUTerol    90 MICROgram(s) HFA Inhaler 1 Puff(s) Inhalation every 4 hours  albuterol/ipratropium for Nebulization 3 milliLiter(s) Nebulizer every 6 hours  aspirin  chewable 81 milliGRAM(s) Oral daily  budesonide 160 MICROgram(s)/formoterol 4.5 MICROgram(s) Inhaler 2 Puff(s) Inhalation two times a day  clopidogrel Tablet 75 milliGRAM(s) Oral daily  ferrous sulfate Oral Tab/Cap - Peds 325 milliGRAM(s) Oral daily  furosemide    Tablet 40 milliGRAM(s) Oral daily  gabapentin 400 milliGRAM(s) Oral three times a day  guaiFENesin  milliGRAM(s) Oral every 12 hours  heparin  Injectable 5000 Unit(s) SubCutaneous every 8 hours  methylPREDNISolone sodium succinate Injectable 40 milliGRAM(s) IV Push every 8 hours  nicotine - 21 mG/24Hr(s) Patch 1 patch Transdermal daily  pantoprazole    Tablet 40 milliGRAM(s) Oral before breakfast  tamsulosin Oral Tab/Cap - Peds 0.4 milliGRAM(s) Oral at bedtime  tiotropium 18 MICROgram(s) Capsule 1 Capsule(s) Inhalation daily  traZODone 100 milliGRAM(s) Oral at bedtime    MEDICATIONS  (PRN):  acetaminophen   Tablet .. 650 milliGRAM(s) Oral every 6 hours PRN Temp greater or equal to 38C (100.4F), Mild Pain (1 - 3)  ALBUTerol    90 MICROgram(s) HFA Inhaler 2 Puff(s) Inhalation every 4 hours PRN Shortness of Breath      Allergies    Ceclor (Rash)  Duricef (Rash)    Intolerances        Vital Signs Last 24 Hrs  T(C): 36.5 (22 Nov 2019 11:36), Max: 36.5 (22 Nov 2019 11:36)  T(F): 97.7 (22 Nov 2019 11:36), Max: 97.7 (22 Nov 2019 11:36)  HR: 104 (22 Nov 2019 11:36) (76 - 117)  BP: 137/84 (22 Nov 2019 11:36) (129/71 - 149/71)  BP(mean): --  RR: 19 (22 Nov 2019 11:36) (19 - 20)  SpO2: 90% (22 Nov 2019 11:36) (90% - 98%)      PHYSICAL EXAMINATION:    NECK:  Supple. No lymphadenopathy. Jugular venous pressure not elevated. Carotids equal.   HEART:   The cardiac impulse has a normal quality. Reg., Nl S1 and S2.  There are no murmurs, rubs or gallops noted  CHEST:  Chest crackles to auscultation. Normal respiratory effort.  ABDOMEN:  Soft and nontender.   EXTREMITIES:  There is no edema.       LABS:    11-22    136  |  102  |  26<H>  ----------------------------<  147<H>  4.0   |  30  |  0.87    Ca    8.9      22 Nov 2019 06:39    TPro  6.7  /  Alb  3.1<L>  /  TBili  0.5  /  DBili  x   /  AST  39<H>  /  ALT  67  /  AlkPhos  136<H>  11-22

## 2019-11-22 NOTE — PROGRESS NOTE ADULT - SUBJECTIVE AND OBJECTIVE BOX
cc: sob  hpi: 64y male w/ pmh copd p/w worsening sob, cough.   Still coughing a lot this morning- productive yellow sputum.  Sob w/ cough.  Rt leg discomfort, swelling.    11/20-called b/c pt lethargic.  Pt seen at bedside- c/o feeling very tired and not sleeping well.  Discussed recent admission at other [facility] in Sept recommended cpap qhs for SHIRA.  Pt states he never started it.  No cp, sob, palp.  States cough and wheeze improving.   Again discussed etoh use and pt denies daily use- reports 3-4 drinks per week.     11/21- denies sob,  Still coughing but not productive.  Hears himself wheezing at times.   11/22- tired, not sleeping well b/c bipap.  Intermittent wheezing.  discussed steroids, lasix.      REVIEW OF SYSTEMS: as per hpi, other 10 point ros negative    Vital Signs Last 24 Hrs  T(C): 36.5 (22 Nov 2019 11:36), Max: 36.5 (22 Nov 2019 11:36)  T(F): 97.7 (22 Nov 2019 11:36), Max: 97.7 (22 Nov 2019 11:36)  HR: 104 (22 Nov 2019 11:36) (76 - 117)  BP: 137/84 (22 Nov 2019 11:36) (129/71 - 149/71)  BP(mean): --  RR: 19 (22 Nov 2019 11:36) (19 - 20)  SpO2: 90% (22 Nov 2019 11:36) (90% - 98%)  T(C): 36.6 (21 Nov 2019 11:27), Max: 36.6 (21 Nov 2019 11:27)  T(F): 97.8 (21 Nov 2019 11:27), Max: 97.8 (21 Nov 2019 11:27)  HR: 76 (21 Nov 2019 14:17) (76 - 110)  BP: 122/69 (21 Nov 2019 11:27) (111/58 - 127/62)  BP(mean): --  RR: 19 (21 Nov 2019 11:27) (19 - 20)  SpO2: 97% (21 Nov 2019 14:17) (91% - 97%) on NC    PHYSICAL EXAM:  General: NAD, comfortable- seated in bed  Neuro: AAOX3, no focal deficits  HEENT: NCAT, EOMI  Neck: Soft and supple  Respiratory: b/l expiratory wheezing; bibasilar rales   Cardiovascular: S1 and S2, RRR  Gastrointestinal: +BS, soft, NTND  Extremities: RLE edema, erythema, warm; LLE bka  Musculoskeletal: strength 5/5             LABS: All Labs Reviewed:    11-22    136  |  102  |  26<H>  ----------------------------<  147<H>  4.0   |  30  |  0.87    Ca    8.9      22 Nov 2019 06:39    TPro  6.7  /  Alb  3.1<L>  /  TBili  0.5  /  DBili  x   /  AST  39<H>  /  ALT  67  /  AlkPhos  136<H>  11-22      PTT - ( 18 Nov 2019 16:55 )  PTT:26.9 sec  CARDIAC MARKERS ( 18 Nov 2019 20:18 )  <0.015 ng/mL / x     / x     / x     / x      CARDIAC MARKERS ( 18 Nov 2019 16:55 )  <0.015 ng/mL / x     / x     / x     / x          Culture - Sputum . (11.19.19 @ 14:50)    Gram Stain:   Rare polymorphonuclear leukocytes per low power field  Few Squamous epithelial cells per low power field  Moderate Gram Variable Cocci per oil power field  Few Gram Negative Rods per oil power field    Specimen Source: .Sputum Sputum        < from: CT Angio Chest PE Protocol w/ IV Cont (11.18.19 @ 18:58) >    IMPRESSION:     No pulmonary embolism.  Marked cardiomegaly.  Mild interstitial lung disease.  Pulmonary hypertension.    < end of copied text >      MEDICATIONS  (STANDING):  acetaZOLAMIDE    Tablet 125 milliGRAM(s) Oral daily  ALBUTerol    90 MICROgram(s) HFA Inhaler 1 Puff(s) Inhalation every 4 hours  albuterol/ipratropium for Nebulization 3 milliLiter(s) Nebulizer every 6 hours  aspirin  chewable 81 milliGRAM(s) Oral daily  budesonide 160 MICROgram(s)/formoterol 4.5 MICROgram(s) Inhaler 2 Puff(s) Inhalation two times a day  clopidogrel Tablet 75 milliGRAM(s) Oral daily  doxycycline hyclate Capsule 100 milliGRAM(s) Oral every 12 hours  ferrous sulfate Oral Tab/Cap - Peds 325 milliGRAM(s) Oral daily  furosemide   Injectable 40 milliGRAM(s) IV Push two times a day  gabapentin 400 milliGRAM(s) Oral three times a day  guaiFENesin  milliGRAM(s) Oral every 12 hours  heparin  Injectable 5000 Unit(s) SubCutaneous every 8 hours  methylPREDNISolone sodium succinate Injectable 40 milliGRAM(s) IV Push every 8 hours  nicotine - 21 mG/24Hr(s) Patch 1 patch Transdermal daily  pantoprazole    Tablet 40 milliGRAM(s) Oral before breakfast  tamsulosin Oral Tab/Cap - Peds 0.4 milliGRAM(s) Oral at bedtime  tiotropium 18 MICROgram(s) Capsule 1 Capsule(s) Inhalation daily  traZODone 100 milliGRAM(s) Oral at bedtime    MEDICATIONS  (PRN):  acetaminophen   Tablet .. 650 milliGRAM(s) Oral every 6 hours PRN Temp greater or equal to 38C (100.4F), Mild Pain (1 - 3)  ALBUTerol    90 MICROgram(s) HFA Inhaler 2 Puff(s) Inhalation every 4 hours PRN Shortness of Breath              Assessment and Plan:   64y male w/     1. acute on chronic hypercapneic respiratory failure due to COPD exacerbation  - CTA no pna or PE  - IV steroids, nebs  - SHIRA on cpap  - sputum cx normal raymundo  - smoking cessation discussed (pt quit 8 days ago);  nicotine patch  11/20- more lethargic, hx shira and not using cpap, abg respiratory acidosis,  start bipap, discussed w/ Pulm /consulted  11/21- Pulm eval appreciated.   increase steroids   11/22- doxy added for possible URI    2. acute on chronic diastolic chf  - IV lasix  - I/Os, daily weights       3. afib  - s/p watchman device  - cardizem --> 11/22 increase cardizem for better rate control    4. dvt px  heparin sc    doppler RLE no dvt     5. hyperammonemia  - CT 5/26/19 reviewed, questionable cirrhosis and pt was due for GI f/u   - lactulose ordered, will repeat level in am--> normal.   Discussed this and CT w/ patient .  He has new GI doc that he will follow up with - reports having recent normal colonoscopy at [place]     6. SHIRA  - start NIPPV qhs

## 2019-11-22 NOTE — SBIRT NOTE ADULT - NSSBIRTBRIEFINTDET_GEN_A_CORE
Pt agrees he has a problem with alcohol use and has been in treatment in the past.  Pt plans to begin AA meetings post discharge for ongoing support with abstinence.

## 2019-11-22 NOTE — PROGRESS NOTE ADULT - ASSESSMENT
PROBLEMS;    Ac on chronic hypercapnic & hypoxamic respiratory failure  OHS/VIJAY  AC flare of COPD/chronic vs acute on chronic bronchitis  Mild interstitial lung disease-NSIP h/o smoking  Pulmonary hypertension  Chronic afib    PLAN;    trial of po abx with doxycylin for acute excerbation of COPD flare  iv solumedrols 40mg q 8hr  aerosols  bipap qhs  Need sleep study as outpat & pap titration  supportive care  nicotine patch  weight lost  dvt prophylasix

## 2019-11-23 LAB
ANION GAP SERPL CALC-SCNC: 3 MMOL/L — LOW (ref 5–17)
BASOPHILS # BLD AUTO: 0.01 K/UL — SIGNIFICANT CHANGE UP (ref 0–0.2)
BASOPHILS NFR BLD AUTO: 0.1 % — SIGNIFICANT CHANGE UP (ref 0–2)
BUN SERPL-MCNC: 30 MG/DL — HIGH (ref 7–23)
CALCIUM SERPL-MCNC: 8.7 MG/DL — SIGNIFICANT CHANGE UP (ref 8.5–10.1)
CHLORIDE SERPL-SCNC: 100 MMOL/L — SIGNIFICANT CHANGE UP (ref 96–108)
CO2 SERPL-SCNC: 33 MMOL/L — HIGH (ref 22–31)
CREAT SERPL-MCNC: 0.92 MG/DL — SIGNIFICANT CHANGE UP (ref 0.5–1.3)
EOSINOPHIL # BLD AUTO: 0 K/UL — SIGNIFICANT CHANGE UP (ref 0–0.5)
EOSINOPHIL NFR BLD AUTO: 0 % — SIGNIFICANT CHANGE UP (ref 0–6)
GLUCOSE SERPL-MCNC: 133 MG/DL — HIGH (ref 70–99)
HCT VFR BLD CALC: 44.4 % — SIGNIFICANT CHANGE UP (ref 39–50)
HGB BLD-MCNC: 13.8 G/DL — SIGNIFICANT CHANGE UP (ref 13–17)
IMM GRANULOCYTES NFR BLD AUTO: 0.8 % — SIGNIFICANT CHANGE UP (ref 0–1.5)
LYMPHOCYTES # BLD AUTO: 0.28 K/UL — LOW (ref 1–3.3)
LYMPHOCYTES # BLD AUTO: 3.5 % — LOW (ref 13–44)
MCHC RBC-ENTMCNC: 29.4 PG — SIGNIFICANT CHANGE UP (ref 27–34)
MCHC RBC-ENTMCNC: 31.1 GM/DL — LOW (ref 32–36)
MCV RBC AUTO: 94.5 FL — SIGNIFICANT CHANGE UP (ref 80–100)
MONOCYTES # BLD AUTO: 0.31 K/UL — SIGNIFICANT CHANGE UP (ref 0–0.9)
MONOCYTES NFR BLD AUTO: 3.9 % — SIGNIFICANT CHANGE UP (ref 2–14)
NEUTROPHILS # BLD AUTO: 7.26 K/UL — SIGNIFICANT CHANGE UP (ref 1.8–7.4)
NEUTROPHILS NFR BLD AUTO: 91.7 % — HIGH (ref 43–77)
PLATELET # BLD AUTO: 156 K/UL — SIGNIFICANT CHANGE UP (ref 150–400)
POTASSIUM SERPL-MCNC: 3.7 MMOL/L — SIGNIFICANT CHANGE UP (ref 3.5–5.3)
POTASSIUM SERPL-SCNC: 3.7 MMOL/L — SIGNIFICANT CHANGE UP (ref 3.5–5.3)
RBC # BLD: 4.7 M/UL — SIGNIFICANT CHANGE UP (ref 4.2–5.8)
RBC # FLD: 20.2 % — HIGH (ref 10.3–14.5)
SODIUM SERPL-SCNC: 136 MMOL/L — SIGNIFICANT CHANGE UP (ref 135–145)
WBC # BLD: 7.92 K/UL — SIGNIFICANT CHANGE UP (ref 3.8–10.5)
WBC # FLD AUTO: 7.92 K/UL — SIGNIFICANT CHANGE UP (ref 3.8–10.5)

## 2019-11-23 RX ADMIN — HEPARIN SODIUM 5000 UNIT(S): 5000 INJECTION INTRAVENOUS; SUBCUTANEOUS at 13:42

## 2019-11-23 RX ADMIN — Medication 3 MILLILITER(S): at 14:21

## 2019-11-23 RX ADMIN — ACETAZOLAMIDE 125 MILLIGRAM(S): 250 TABLET ORAL at 12:15

## 2019-11-23 RX ADMIN — Medication 600 MILLIGRAM(S): at 05:03

## 2019-11-23 RX ADMIN — PANTOPRAZOLE SODIUM 40 MILLIGRAM(S): 20 TABLET, DELAYED RELEASE ORAL at 05:01

## 2019-11-23 RX ADMIN — HEPARIN SODIUM 5000 UNIT(S): 5000 INJECTION INTRAVENOUS; SUBCUTANEOUS at 05:03

## 2019-11-23 RX ADMIN — Medication 100 MILLIGRAM(S): at 18:14

## 2019-11-23 RX ADMIN — Medication 1 PATCH: at 19:00

## 2019-11-23 RX ADMIN — Medication 1 PATCH: at 12:15

## 2019-11-23 RX ADMIN — Medication 650 MILLIGRAM(S): at 08:00

## 2019-11-23 RX ADMIN — GABAPENTIN 400 MILLIGRAM(S): 400 CAPSULE ORAL at 05:01

## 2019-11-23 RX ADMIN — Medication 3 MILLILITER(S): at 01:17

## 2019-11-23 RX ADMIN — TAMSULOSIN HYDROCHLORIDE 0.4 MILLIGRAM(S): 0.4 CAPSULE ORAL at 21:12

## 2019-11-23 RX ADMIN — Medication 81 MILLIGRAM(S): at 12:16

## 2019-11-23 RX ADMIN — Medication 40 MILLIGRAM(S): at 05:03

## 2019-11-23 RX ADMIN — GABAPENTIN 400 MILLIGRAM(S): 400 CAPSULE ORAL at 13:42

## 2019-11-23 RX ADMIN — Medication 3 MILLILITER(S): at 19:38

## 2019-11-23 RX ADMIN — Medication 3 MILLILITER(S): at 08:50

## 2019-11-23 RX ADMIN — GABAPENTIN 400 MILLIGRAM(S): 400 CAPSULE ORAL at 21:12

## 2019-11-23 RX ADMIN — CLOPIDOGREL BISULFATE 75 MILLIGRAM(S): 75 TABLET, FILM COATED ORAL at 12:16

## 2019-11-23 RX ADMIN — Medication 100 MILLIGRAM(S): at 05:03

## 2019-11-23 RX ADMIN — Medication 40 MILLIGRAM(S): at 18:14

## 2019-11-23 RX ADMIN — Medication 1 PATCH: at 06:32

## 2019-11-23 RX ADMIN — Medication 40 MILLIGRAM(S): at 21:12

## 2019-11-23 RX ADMIN — HEPARIN SODIUM 5000 UNIT(S): 5000 INJECTION INTRAVENOUS; SUBCUTANEOUS at 21:13

## 2019-11-23 RX ADMIN — Medication 325 MILLIGRAM(S): at 12:15

## 2019-11-23 RX ADMIN — Medication 180 MILLIGRAM(S): at 05:03

## 2019-11-23 RX ADMIN — Medication 1 PATCH: at 12:17

## 2019-11-23 RX ADMIN — Medication 600 MILLIGRAM(S): at 18:14

## 2019-11-23 RX ADMIN — Medication 40 MILLIGRAM(S): at 13:42

## 2019-11-23 NOTE — PROGRESS NOTE ADULT - ASSESSMENT
PROBLEMS;    Ac on chronic hypercapnic & hypoxamic respiratory failure  OHS/VIJAY  AC flare of COPD/chronic vs acute on chronic bronchitis  Mild interstitial lung disease-NSIP h/o smoking  Pulmonary hypertension  Chronic afib    PLAN;    po abx with doxycylin for acute excerbation of COPD flare  iv solumedrols 40mg q 8hr  aerosols  bipap qhs  Need sleep study as outpat & pap titration  supportive care  nicotine patch  weight lost  dvt prophylasix

## 2019-11-23 NOTE — PROGRESS NOTE ADULT - SUBJECTIVE AND OBJECTIVE BOX
cc: sob  hpi: 64y male w/ pmh copd p/w worsening sob, cough.   Still coughing a lot this morning- productive yellow sputum.  Sob w/ cough.  Rt leg discomfort, swelling.    11/20-called b/c pt lethargic.  Pt seen at bedside- c/o feeling very tired and not sleeping well.  Discussed recent admission at other [facility] in Sept recommended cpap qhs for SHIRA.  Pt states he never started it.  No cp, sob, palp.  States cough and wheeze improving.   Again discussed etoh use and pt denies daily use- reports 3-4 drinks per week.     11/21- denies sob,  Still coughing but not productive.  Hears himself wheezing at times.   11/22- tired, not sleeping well b/c bipap.  Intermittent wheezing.  discussed steroids, lasix.  11/23- sob w/ exertion- O2 sats 91% after ambulating to bathroom w/o O2 and then 95% once 2LNC applied.  No cough.  No further wheezing.        REVIEW OF SYSTEMS: as per hpi, other 10 point ros negative    Vital Signs Last 24 Hrs  T(C): 36.7 (23 Nov 2019 11:35), Max: 37.1 (22 Nov 2019 20:36)  T(F): 98 (23 Nov 2019 11:35), Max: 98.7 (22 Nov 2019 20:36)  HR: 102 (23 Nov 2019 11:35) (74 - 108)  BP: 144/73 (23 Nov 2019 11:35) (131/79 - 148/77)  BP(mean): --  RR: 18 (23 Nov 2019 11:35) (18 - 19)  SpO2: 95% (23 Nov 2019 11:35) (95% - 97%)  T(C): 36.5 (22 Nov 2019 11:36), Max: 36.5 (22 Nov 2019 11:36)      PHYSICAL EXAM:  General: NAD, comfortable- seated in bed  Neuro: AAOX3, no focal deficits  HEENT: NCAT, EOMI  Neck: Soft and supple  Respiratory: no further wheezing; still b/l coarse sounds  Cardiovascular: S1 and S2, RRR  Gastrointestinal: +BS, soft, NTND  Extremities: RLE edema, erythema, warm; LLE bka  Musculoskeletal:  strength 5/5           LABS: All Labs Reviewed:                        13.8   7.92  )-----------( 156      ( 23 Nov 2019 06:12 )             44.4     11-23    136  |  100  |  30<H>  ----------------------------<  133<H>  3.7   |  33<H>  |  0.92    Ca    8.7      23 Nov 2019 06:12    TPro  6.7  /  Alb  3.1<L>  /  TBili  0.5  /  DBili  x   /  AST  39<H>  /  ALT  67  /  AlkPhos  136<H>  11-22        Culture - Sputum . (11.19.19 @ 14:50)    Gram Stain:   Rare polymorphonuclear leukocytes per low power field  Few Squamous epithelial cells per low power field  Moderate Gram Variable Cocci per oil power field  Few Gram Negative Rods per oil power field    Specimen Source: .Sputum Sputum        < from: CT Angio Chest PE Protocol w/ IV Cont (11.18.19 @ 18:58) >    IMPRESSION:     No pulmonary embolism.  Marked cardiomegaly.  Mild interstitial lung disease.  Pulmonary hypertension.    < end of copied text >    MEDICATIONS  (STANDING):  acetaZOLAMIDE    Tablet 125 milliGRAM(s) Oral daily  ALBUTerol    90 MICROgram(s) HFA Inhaler 1 Puff(s) Inhalation every 4 hours  albuterol/ipratropium for Nebulization 3 milliLiter(s) Nebulizer every 6 hours  aspirin  chewable 81 milliGRAM(s) Oral daily  budesonide 160 MICROgram(s)/formoterol 4.5 MICROgram(s) Inhaler 2 Puff(s) Inhalation two times a day  clopidogrel Tablet 75 milliGRAM(s) Oral daily  diltiazem    milliGRAM(s) Oral daily  doxycycline hyclate Capsule 100 milliGRAM(s) Oral every 12 hours  ferrous sulfate Oral Tab/Cap - Peds 325 milliGRAM(s) Oral daily  furosemide   Injectable 40 milliGRAM(s) IV Push two times a day  gabapentin 400 milliGRAM(s) Oral three times a day  guaiFENesin  milliGRAM(s) Oral every 12 hours  heparin  Injectable 5000 Unit(s) SubCutaneous every 8 hours  methylPREDNISolone sodium succinate Injectable 40 milliGRAM(s) IV Push every 8 hours  nicotine - 21 mG/24Hr(s) Patch 1 patch Transdermal daily  pantoprazole    Tablet 40 milliGRAM(s) Oral before breakfast  tamsulosin Oral Tab/Cap - Peds 0.4 milliGRAM(s) Oral at bedtime  tiotropium 18 MICROgram(s) Capsule 1 Capsule(s) Inhalation daily  traZODone 100 milliGRAM(s) Oral at bedtime    MEDICATIONS  (PRN):  acetaminophen   Tablet .. 650 milliGRAM(s) Oral every 6 hours PRN Temp greater or equal to 38C (100.4F), Mild Pain (1 - 3)  ALBUTerol    90 MICROgram(s) HFA Inhaler 2 Puff(s) Inhalation every 4 hours PRN Shortness of Breath              Assessment and Plan:   64y male w/     1. acute on chronic hypercapneic respiratory failure due to COPD exacerbation  - CTA no pna or PE  - IV steroids, nebs  - SHIRA on cpap  - sputum cx normal raymundo  - smoking cessation discussed (pt quit 8 days ago);  nicotine patch  11/20- more lethargic, hx shira and not using cpap, abg respiratory acidosis,  start bipap, discussed w/ Pulm /consulted  11/21- Pulm eval appreciated.   increase steroids   11/22- doxy added for possible URI  11/23- feeling better, O2 sats improving,  desats to 91% on room air after exertion    2. acute on chronic diastolic chf  - IV lasix  - I/Os, daily weights   11/23- conitnue iv lasix another day and reassess      3. afib  - s/p watchman device  - cardizem --> 11/22 increase cardizem for better rate control    4. dvt px  heparin sc    doppler RLE no dvt     5. hyperammonemia  - CT 5/26/19 reviewed, questionable cirrhosis and pt was due for GI f/u   - lactulose ordered, will repeat level in am--> normal.   Discussed this and CT w/ patient .  He has new GI doc that he will follow up with - reports having recent normal colonoscopy at [place]     6. SHIRA  - start NIPPV qhs        PT eval

## 2019-11-23 NOTE — PROGRESS NOTE ADULT - SUBJECTIVE AND OBJECTIVE BOX
Subjective:    pat better, no new complaints.    Home Medications:  albuterol 2.5 mg/3 mL (0.083%) inhalation solution: 3 milliliter(s) inhaled every 6 hours, As Needed -for shortness of breath and/or wheezing (18 Nov 2019 22:02)  dilTIAZem 24 hour extended release: 1 tab(s) orally once a day  **pt is unsure if its 120 or 240 mg** (18 Nov 2019 22:02)  furosemide 40 mg oral tablet: 1 tab(s) orally once a day (18 Nov 2019 22:02)  gabapentin 400 mg oral capsule: 1 cap(s) orally 3 times a day (18 Nov 2019 22:02)  pantoprazole 40 mg oral granule, enteric coated: 40 milligram(s) orally once a day (18 Nov 2019 22:02)  Spiriva 18 mcg inhalation capsule: 1 cap(s) inhaled once a day (18 Nov 2019 22:02)  tamsulosin 0.4 mg oral capsule: 1 cap(s) orally once a day (at bedtime) (18 Nov 2019 22:02)  traZODone 50 mg oral tablet: 2 tab(s) orally once a day (at bedtime), As Needed (18 Nov 2019 22:02)    MEDICATIONS  (STANDING):  acetaZOLAMIDE    Tablet 125 milliGRAM(s) Oral daily  ALBUTerol    90 MICROgram(s) HFA Inhaler 1 Puff(s) Inhalation every 4 hours  albuterol/ipratropium for Nebulization 3 milliLiter(s) Nebulizer every 6 hours  aspirin  chewable 81 milliGRAM(s) Oral daily  budesonide 160 MICROgram(s)/formoterol 4.5 MICROgram(s) Inhaler 2 Puff(s) Inhalation two times a day  clopidogrel Tablet 75 milliGRAM(s) Oral daily  diltiazem    milliGRAM(s) Oral daily  doxycycline hyclate Capsule 100 milliGRAM(s) Oral every 12 hours  ferrous sulfate Oral Tab/Cap - Peds 325 milliGRAM(s) Oral daily  furosemide   Injectable 40 milliGRAM(s) IV Push two times a day  gabapentin 400 milliGRAM(s) Oral three times a day  guaiFENesin  milliGRAM(s) Oral every 12 hours  heparin  Injectable 5000 Unit(s) SubCutaneous every 8 hours  methylPREDNISolone sodium succinate Injectable 40 milliGRAM(s) IV Push every 8 hours  nicotine - 21 mG/24Hr(s) Patch 1 patch Transdermal daily  pantoprazole    Tablet 40 milliGRAM(s) Oral before breakfast  tamsulosin Oral Tab/Cap - Peds 0.4 milliGRAM(s) Oral at bedtime  tiotropium 18 MICROgram(s) Capsule 1 Capsule(s) Inhalation daily  traZODone 100 milliGRAM(s) Oral at bedtime    MEDICATIONS  (PRN):  acetaminophen   Tablet .. 650 milliGRAM(s) Oral every 6 hours PRN Temp greater or equal to 38C (100.4F), Mild Pain (1 - 3)  ALBUTerol    90 MICROgram(s) HFA Inhaler 2 Puff(s) Inhalation every 4 hours PRN Shortness of Breath      Allergies    Ceclor (Rash)  Duricef (Rash)    Intolerances        Vital Signs Last 24 Hrs  T(C): 36.7 (23 Nov 2019 11:35), Max: 37.1 (22 Nov 2019 20:36)  T(F): 98 (23 Nov 2019 11:35), Max: 98.7 (22 Nov 2019 20:36)  HR: 102 (23 Nov 2019 11:35) (74 - 108)  BP: 144/73 (23 Nov 2019 11:35) (131/79 - 148/77)  BP(mean): --  RR: 18 (23 Nov 2019 11:35) (18 - 19)  SpO2: 95% (23 Nov 2019 11:35) (95% - 97%)      PHYSICAL EXAMINATION:    NECK:  Supple. No lymphadenopathy. Jugular venous pressure not elevated. Carotids equal.   HEART:   The cardiac impulse has a normal quality. Reg., Nl S1 and S2.  There are no murmurs, rubs or gallops noted  CHEST:  Chest is clear to auscultation. Normal respiratory effort.  ABDOMEN:  Soft and nontender.   EXTREMITIES:  There is no edema.       LABS:                        13.8   7.92  )-----------( 156      ( 23 Nov 2019 06:12 )             44.4     11-23    136  |  100  |  30<H>  ----------------------------<  133<H>  3.7   |  33<H>  |  0.92    Ca    8.7      23 Nov 2019 06:12    TPro  6.7  /  Alb  3.1<L>  /  TBili  0.5  /  DBili  x   /  AST  39<H>  /  ALT  67  /  AlkPhos  136<H>  11-22

## 2019-11-24 LAB
ANION GAP SERPL CALC-SCNC: 8 MMOL/L — SIGNIFICANT CHANGE UP (ref 5–17)
BUN SERPL-MCNC: 36 MG/DL — HIGH (ref 7–23)
CALCIUM SERPL-MCNC: 8.4 MG/DL — LOW (ref 8.5–10.1)
CHLORIDE SERPL-SCNC: 100 MMOL/L — SIGNIFICANT CHANGE UP (ref 96–108)
CO2 SERPL-SCNC: 30 MMOL/L — SIGNIFICANT CHANGE UP (ref 22–31)
CREAT SERPL-MCNC: 1.08 MG/DL — SIGNIFICANT CHANGE UP (ref 0.5–1.3)
GLUCOSE SERPL-MCNC: 178 MG/DL — HIGH (ref 70–99)
POTASSIUM SERPL-MCNC: 3.4 MMOL/L — LOW (ref 3.5–5.3)
POTASSIUM SERPL-SCNC: 3.4 MMOL/L — LOW (ref 3.5–5.3)
SODIUM SERPL-SCNC: 138 MMOL/L — SIGNIFICANT CHANGE UP (ref 135–145)

## 2019-11-24 RX ORDER — ENOXAPARIN SODIUM 100 MG/ML
40 INJECTION SUBCUTANEOUS DAILY
Refills: 0 | Status: DISCONTINUED | OUTPATIENT
Start: 2019-11-24 | End: 2019-11-26

## 2019-11-24 RX ORDER — POTASSIUM CHLORIDE 20 MEQ
40 PACKET (EA) ORAL ONCE
Refills: 0 | Status: COMPLETED | OUTPATIENT
Start: 2019-11-24 | End: 2019-11-24

## 2019-11-24 RX ORDER — FUROSEMIDE 40 MG
40 TABLET ORAL
Refills: 0 | Status: DISCONTINUED | OUTPATIENT
Start: 2019-11-24 | End: 2019-11-26

## 2019-11-24 RX ADMIN — Medication 100 MILLIGRAM(S): at 18:23

## 2019-11-24 RX ADMIN — GABAPENTIN 400 MILLIGRAM(S): 400 CAPSULE ORAL at 21:26

## 2019-11-24 RX ADMIN — Medication 650 MILLIGRAM(S): at 15:16

## 2019-11-24 RX ADMIN — GABAPENTIN 400 MILLIGRAM(S): 400 CAPSULE ORAL at 13:34

## 2019-11-24 RX ADMIN — Medication 325 MILLIGRAM(S): at 12:34

## 2019-11-24 RX ADMIN — CLOPIDOGREL BISULFATE 75 MILLIGRAM(S): 75 TABLET, FILM COATED ORAL at 12:34

## 2019-11-24 RX ADMIN — PANTOPRAZOLE SODIUM 40 MILLIGRAM(S): 20 TABLET, DELAYED RELEASE ORAL at 06:42

## 2019-11-24 RX ADMIN — Medication 650 MILLIGRAM(S): at 08:55

## 2019-11-24 RX ADMIN — TAMSULOSIN HYDROCHLORIDE 0.4 MILLIGRAM(S): 0.4 CAPSULE ORAL at 21:25

## 2019-11-24 RX ADMIN — Medication 1 PATCH: at 19:34

## 2019-11-24 RX ADMIN — Medication 3 MILLILITER(S): at 08:17

## 2019-11-24 RX ADMIN — Medication 100 MILLIGRAM(S): at 21:27

## 2019-11-24 RX ADMIN — Medication 40 MILLIGRAM(S): at 06:43

## 2019-11-24 RX ADMIN — ENOXAPARIN SODIUM 40 MILLIGRAM(S): 100 INJECTION SUBCUTANEOUS at 12:33

## 2019-11-24 RX ADMIN — Medication 3 MILLILITER(S): at 02:24

## 2019-11-24 RX ADMIN — Medication 100 MILLIGRAM(S): at 06:42

## 2019-11-24 RX ADMIN — Medication 1 PATCH: at 12:33

## 2019-11-24 RX ADMIN — Medication 3 MILLILITER(S): at 20:36

## 2019-11-24 RX ADMIN — Medication 81 MILLIGRAM(S): at 12:34

## 2019-11-24 RX ADMIN — ACETAZOLAMIDE 125 MILLIGRAM(S): 250 TABLET ORAL at 12:34

## 2019-11-24 RX ADMIN — Medication 180 MILLIGRAM(S): at 06:43

## 2019-11-24 RX ADMIN — GABAPENTIN 400 MILLIGRAM(S): 400 CAPSULE ORAL at 06:42

## 2019-11-24 RX ADMIN — Medication 600 MILLIGRAM(S): at 06:43

## 2019-11-24 RX ADMIN — Medication 40 MILLIEQUIVALENT(S): at 08:55

## 2019-11-24 RX ADMIN — Medication 3 MILLILITER(S): at 14:43

## 2019-11-24 RX ADMIN — Medication 40 MILLIGRAM(S): at 18:23

## 2019-11-24 RX ADMIN — Medication 40 MILLIGRAM(S): at 18:22

## 2019-11-24 RX ADMIN — Medication 650 MILLIGRAM(S): at 02:30

## 2019-11-24 RX ADMIN — Medication 600 MILLIGRAM(S): at 18:23

## 2019-11-24 RX ADMIN — Medication 650 MILLIGRAM(S): at 21:45

## 2019-11-24 RX ADMIN — Medication 650 MILLIGRAM(S): at 21:27

## 2019-11-24 RX ADMIN — HEPARIN SODIUM 5000 UNIT(S): 5000 INJECTION INTRAVENOUS; SUBCUTANEOUS at 06:43

## 2019-11-24 NOTE — PROGRESS NOTE ADULT - SUBJECTIVE AND OBJECTIVE BOX
cc: sob  hpi: 64y male w/ pmh copd p/w worsening sob, cough.   Still coughing a lot this morning- productive yellow sputum.  Sob w/ cough.  Rt leg discomfort, swelling.    11/20-called b/c pt lethargic.  Pt seen at bedside- c/o feeling very tired and not sleeping well.  Discussed recent admission at other [facility] in Sept recommended cpap qhs for SHIRA.  Pt states he never started it.  No cp, sob, palp.  States cough and wheeze improving.   Again discussed etoh use and pt denies daily use- reports 3-4 drinks per week.     11/21- denies sob,  Still coughing but not productive.  Hears himself wheezing at times.   11/22- tired, not sleeping well b/c bipap.  Intermittent wheezing.  discussed steroids, lasix.  11/23- sob w/ exertion- O2 sats 91% after ambulating to bathroom w/o O2 and then 95% once 2LNC applied.  No cough.  No further wheezing.    11/24-  feeling much better today. Less sob       REVIEW OF SYSTEMS: as per hpi, other 10 point ros negative    Vital Signs Last 24 Hrs  T(C): 36.6 (24 Nov 2019 11:38), Max: 36.8 (23 Nov 2019 22:18)  T(F): 97.8 (24 Nov 2019 11:38), Max: 98.3 (23 Nov 2019 22:18)  HR: 91 (24 Nov 2019 11:38) (78 - 100)  BP: 123/78 (24 Nov 2019 11:38) (123/62 - 152/73)  BP(mean): --  RR: 18 (24 Nov 2019 11:38) (18 - 18)  SpO2: 97% (24 Nov 2019 11:38) (94% - 97%) on 2LNC  T(C): 36.7 (23 Nov 2019 11:35), Max: 37.1 (22 Nov 2019 20:36)      PHYSICAL EXAM:  General: NAD, comfortable- seated in bed  Neuro: AAOX3, no focal deficits  HEENT: NCAT, EOMI  Neck: Soft and supple  Respiratory: cta b/l ; no w/r/r  Cardiovascular: S1 and S2, RRR  Gastrointestinal: +BS, soft, NTND  Extremities: RLE edema, erythema, warm; LLE bka  Musculoskeletal:  strength 5/5           LABS: All Labs Reviewed:                        13.8   7.92  )-----------( 156      ( 23 Nov 2019 06:12 )             44.4     11-24    138  |  100  |  36<H>  ----------------------------<  178<H>  3.4<L>   |  30  |  1.08    Ca    8.4<L>      24 Nov 2019 06:11            Culture - Sputum . (11.19.19 @ 14:50)    Gram Stain:   Rare polymorphonuclear leukocytes per low power field  Few Squamous epithelial cells per low power field  Moderate Gram Variable Cocci per oil power field  Few Gram Negative Rods per oil power field    Specimen Source: .Sputum Sputum        < from: CT Angio Chest PE Protocol w/ IV Cont (11.18.19 @ 18:58) >    IMPRESSION:     No pulmonary embolism.  Marked cardiomegaly.  Mild interstitial lung disease.  Pulmonary hypertension.    < end of copied text >    MEDICATIONS  (STANDING):  acetaZOLAMIDE    Tablet 125 milliGRAM(s) Oral daily  ALBUTerol    90 MICROgram(s) HFA Inhaler 1 Puff(s) Inhalation every 4 hours  albuterol/ipratropium for Nebulization 3 milliLiter(s) Nebulizer every 6 hours  aspirin  chewable 81 milliGRAM(s) Oral daily  budesonide 160 MICROgram(s)/formoterol 4.5 MICROgram(s) Inhaler 2 Puff(s) Inhalation two times a day  clopidogrel Tablet 75 milliGRAM(s) Oral daily  diltiazem    milliGRAM(s) Oral daily  doxycycline hyclate Capsule 100 milliGRAM(s) Oral every 12 hours  ferrous sulfate Oral Tab/Cap - Peds 325 milliGRAM(s) Oral daily  furosemide   Injectable 40 milliGRAM(s) IV Push two times a day  gabapentin 400 milliGRAM(s) Oral three times a day  guaiFENesin  milliGRAM(s) Oral every 12 hours  heparin  Injectable 5000 Unit(s) SubCutaneous every 8 hours  methylPREDNISolone sodium succinate Injectable 40 milliGRAM(s) IV Push every 8 hours  nicotine - 21 mG/24Hr(s) Patch 1 patch Transdermal daily  pantoprazole    Tablet 40 milliGRAM(s) Oral before breakfast  tamsulosin Oral Tab/Cap - Peds 0.4 milliGRAM(s) Oral at bedtime  tiotropium 18 MICROgram(s) Capsule 1 Capsule(s) Inhalation daily  traZODone 100 milliGRAM(s) Oral at bedtime    MEDICATIONS  (PRN):  acetaminophen   Tablet .. 650 milliGRAM(s) Oral every 6 hours PRN Temp greater or equal to 38C (100.4F), Mild Pain (1 - 3)  ALBUTerol    90 MICROgram(s) HFA Inhaler 2 Puff(s) Inhalation every 4 hours PRN Shortness of Breath            Assessment and Plan:   64y male w/     1. acute on chronic hypercapneic respiratory failure due to COPD exacerbation  - CTA no pna or PE  - IV steroids, nebs  - SHIRA on cpap  - sputum cx normal raymundo  - smoking cessation discussed (pt quit 8 days ago);  nicotine patch  11/20- more lethargic, hx shira and not using cpap, abg respiratory acidosis,  start bipap, discussed w/ Pulm /consulted  11/21- Pulm eval appreciated.   increase steroids   11/22- doxy added for possible URI  11/23- feeling better, O2 sats improving,  desats to 91% on room air after exertion  11/24- decrease steroids    2. acute on chronic diastolic chf  - IV lasix  - I/Os, daily weights   11/23- conitnue iv lasix another day and reassess  11/24- change to po lasix      3. afib  - s/p watchman device  - cardizem --> 11/22 increase cardizem for better rate control    4. dvt px  heparin sc    doppler RLE no dvt     5. hyperammonemia  - CT 5/26/19 reviewed, questionable cirrhosis and pt was due for GI f/u   - lactulose ordered, will repeat level in am--> normal.   Discussed this and CT w/ patient .  He has new GI doc that he will follow up with - reports having recent normal colonoscopy at [place]     6. SHIRA  - start NIPPV qhs        PT eval

## 2019-11-24 NOTE — PROGRESS NOTE ADULT - SUBJECTIVE AND OBJECTIVE BOX
Subjective:    pat better, sitting in bed.    Home Medications:  albuterol 2.5 mg/3 mL (0.083%) inhalation solution: 3 milliliter(s) inhaled every 6 hours, As Needed -for shortness of breath and/or wheezing (18 Nov 2019 22:02)  dilTIAZem 24 hour extended release: 1 tab(s) orally once a day  **pt is unsure if its 120 or 240 mg** (18 Nov 2019 22:02)  furosemide 40 mg oral tablet: 1 tab(s) orally once a day (18 Nov 2019 22:02)  gabapentin 400 mg oral capsule: 1 cap(s) orally 3 times a day (18 Nov 2019 22:02)  pantoprazole 40 mg oral granule, enteric coated: 40 milligram(s) orally once a day (18 Nov 2019 22:02)  Spiriva 18 mcg inhalation capsule: 1 cap(s) inhaled once a day (18 Nov 2019 22:02)  tamsulosin 0.4 mg oral capsule: 1 cap(s) orally once a day (at bedtime) (18 Nov 2019 22:02)  traZODone 50 mg oral tablet: 2 tab(s) orally once a day (at bedtime), As Needed (18 Nov 2019 22:02)    MEDICATIONS  (STANDING):  acetaZOLAMIDE    Tablet 125 milliGRAM(s) Oral daily  ALBUTerol    90 MICROgram(s) HFA Inhaler 1 Puff(s) Inhalation every 4 hours  albuterol/ipratropium for Nebulization 3 milliLiter(s) Nebulizer every 6 hours  aspirin  chewable 81 milliGRAM(s) Oral daily  budesonide 160 MICROgram(s)/formoterol 4.5 MICROgram(s) Inhaler 2 Puff(s) Inhalation two times a day  clopidogrel Tablet 75 milliGRAM(s) Oral daily  diltiazem    milliGRAM(s) Oral daily  doxycycline hyclate Capsule 100 milliGRAM(s) Oral every 12 hours  enoxaparin Injectable 40 milliGRAM(s) SubCutaneous daily  ferrous sulfate Oral Tab/Cap - Peds 325 milliGRAM(s) Oral daily  furosemide    Tablet 40 milliGRAM(s) Oral two times a day  gabapentin 400 milliGRAM(s) Oral three times a day  guaiFENesin  milliGRAM(s) Oral every 12 hours  methylPREDNISolone sodium succinate Injectable 40 milliGRAM(s) IV Push two times a day  nicotine - 21 mG/24Hr(s) Patch 1 patch Transdermal daily  pantoprazole    Tablet 40 milliGRAM(s) Oral before breakfast  tamsulosin Oral Tab/Cap - Peds 0.4 milliGRAM(s) Oral at bedtime  tiotropium 18 MICROgram(s) Capsule 1 Capsule(s) Inhalation daily  traZODone 100 milliGRAM(s) Oral at bedtime    MEDICATIONS  (PRN):  acetaminophen   Tablet .. 650 milliGRAM(s) Oral every 6 hours PRN Temp greater or equal to 38C (100.4F), Mild Pain (1 - 3)  ALBUTerol    90 MICROgram(s) HFA Inhaler 2 Puff(s) Inhalation every 4 hours PRN Shortness of Breath      Allergies    Ceclor (Rash)  Duricef (Rash)    Intolerances        Vital Signs Last 24 Hrs  T(C): 36.6 (24 Nov 2019 11:38), Max: 36.8 (23 Nov 2019 22:18)  T(F): 97.8 (24 Nov 2019 11:38), Max: 98.3 (23 Nov 2019 22:18)  HR: 91 (24 Nov 2019 11:38) (78 - 100)  BP: 123/78 (24 Nov 2019 11:38) (123/62 - 152/73)  BP(mean): --  RR: 18 (24 Nov 2019 11:38) (18 - 18)  SpO2: 97% (24 Nov 2019 11:38) (94% - 97%)      PHYSICAL EXAMINATION:    NECK:  Supple. No lymphadenopathy. Jugular venous pressure not elevated. Carotids equal.   HEART:   The cardiac impulse has a normal quality. Reg., Nl S1 and S2.  There are no murmurs, rubs or gallops noted  CHEST:  Chest crackles to auscultation. Normal respiratory effort.  ABDOMEN:  Soft and nontender.   EXTREMITIES:  There is no edema.       LABS:                        13.8   7.92  )-----------( 156      ( 23 Nov 2019 06:12 )             44.4     11-24    138  |  100  |  36<H>  ----------------------------<  178<H>  3.4<L>   |  30  |  1.08    Ca    8.4<L>      24 Nov 2019 06:11

## 2019-11-24 NOTE — PROGRESS NOTE ADULT - ASSESSMENT
PROBLEMS;    Ac on chronic hypercapnic & hypoxamic respiratory failure  [facility]/VIJAY  AC flare of COPD/chronic vs acute on chronic bronchitis  Mild interstitial lung disease-NSIP h/o smoking  Pulmonary hypertension  Chronic afib    PLAN;    pulmonary better decd planning  po doxycylin   iv solumedrols 40mg q 12hr  aerosols  bipap qhs  Need sleep study as outpat & pap titration  supportive care  nicotine patch  weight lost  dvt prophylasix

## 2019-11-25 ENCOUNTER — TRANSCRIPTION ENCOUNTER (OUTPATIENT)
Age: 64
End: 2019-11-25

## 2019-11-25 LAB
ANION GAP SERPL CALC-SCNC: 4 MMOL/L — LOW (ref 5–17)
BASE EXCESS BLDA CALC-SCNC: 3.9 MMOL/L — HIGH (ref -2–2)
BLOOD GAS COMMENTS ARTERIAL: SIGNIFICANT CHANGE UP
BUN SERPL-MCNC: 28 MG/DL — HIGH (ref 7–23)
CALCIUM SERPL-MCNC: 8.3 MG/DL — LOW (ref 8.5–10.1)
CHLORIDE SERPL-SCNC: 100 MMOL/L — SIGNIFICANT CHANGE UP (ref 96–108)
CO2 SERPL-SCNC: 33 MMOL/L — HIGH (ref 22–31)
CREAT SERPL-MCNC: 0.97 MG/DL — SIGNIFICANT CHANGE UP (ref 0.5–1.3)
GAS PNL BLDA: SIGNIFICANT CHANGE UP
GLUCOSE SERPL-MCNC: 129 MG/DL — HIGH (ref 70–99)
HCO3 BLDA-SCNC: 31 MMOL/L — HIGH (ref 21–29)
PCO2 BLDA: 60 MMHG — HIGH (ref 32–46)
PH BLDA: 7.33 — LOW (ref 7.35–7.45)
PO2 BLDA: 102 MMHG — SIGNIFICANT CHANGE UP (ref 74–108)
POTASSIUM SERPL-MCNC: 3.7 MMOL/L — SIGNIFICANT CHANGE UP (ref 3.5–5.3)
POTASSIUM SERPL-SCNC: 3.7 MMOL/L — SIGNIFICANT CHANGE UP (ref 3.5–5.3)
SAO2 % BLDA: 97 % — HIGH (ref 92–96)
SODIUM SERPL-SCNC: 137 MMOL/L — SIGNIFICANT CHANGE UP (ref 135–145)

## 2019-11-25 RX ORDER — FUROSEMIDE 40 MG
1 TABLET ORAL
Qty: 60 | Refills: 0
Start: 2019-11-25 | End: 2019-12-24

## 2019-11-25 RX ORDER — NICOTINE POLACRILEX 2 MG
0 GUM BUCCAL
Qty: 0 | Refills: 0 | DISCHARGE
Start: 2019-11-25

## 2019-11-25 RX ORDER — DILTIAZEM HCL 120 MG
1 CAPSULE, EXT RELEASE 24 HR ORAL
Qty: 30 | Refills: 0
Start: 2019-11-25 | End: 2019-12-24

## 2019-11-25 RX ORDER — FUROSEMIDE 40 MG
1 TABLET ORAL
Qty: 0 | Refills: 0 | DISCHARGE

## 2019-11-25 RX ADMIN — Medication 3 MILLILITER(S): at 01:31

## 2019-11-25 RX ADMIN — GABAPENTIN 400 MILLIGRAM(S): 400 CAPSULE ORAL at 13:43

## 2019-11-25 RX ADMIN — Medication 1 PATCH: at 06:46

## 2019-11-25 RX ADMIN — ACETAZOLAMIDE 125 MILLIGRAM(S): 250 TABLET ORAL at 12:31

## 2019-11-25 RX ADMIN — Medication 40 MILLIGRAM(S): at 18:31

## 2019-11-25 RX ADMIN — Medication 100 MILLIGRAM(S): at 05:20

## 2019-11-25 RX ADMIN — Medication 40 MILLIGRAM(S): at 05:19

## 2019-11-25 RX ADMIN — Medication 1 PATCH: at 12:41

## 2019-11-25 RX ADMIN — Medication 100 MILLIGRAM(S): at 21:28

## 2019-11-25 RX ADMIN — Medication 180 MILLIGRAM(S): at 05:20

## 2019-11-25 RX ADMIN — CLOPIDOGREL BISULFATE 75 MILLIGRAM(S): 75 TABLET, FILM COATED ORAL at 12:30

## 2019-11-25 RX ADMIN — Medication 3 MILLILITER(S): at 19:25

## 2019-11-25 RX ADMIN — PANTOPRAZOLE SODIUM 40 MILLIGRAM(S): 20 TABLET, DELAYED RELEASE ORAL at 05:19

## 2019-11-25 RX ADMIN — Medication 325 MILLIGRAM(S): at 12:30

## 2019-11-25 RX ADMIN — TAMSULOSIN HYDROCHLORIDE 0.4 MILLIGRAM(S): 0.4 CAPSULE ORAL at 21:28

## 2019-11-25 RX ADMIN — ENOXAPARIN SODIUM 40 MILLIGRAM(S): 100 INJECTION SUBCUTANEOUS at 12:30

## 2019-11-25 RX ADMIN — Medication 40 MILLIGRAM(S): at 05:20

## 2019-11-25 RX ADMIN — Medication 600 MILLIGRAM(S): at 05:20

## 2019-11-25 RX ADMIN — Medication 1 PATCH: at 12:30

## 2019-11-25 RX ADMIN — GABAPENTIN 400 MILLIGRAM(S): 400 CAPSULE ORAL at 05:19

## 2019-11-25 RX ADMIN — Medication 600 MILLIGRAM(S): at 18:31

## 2019-11-25 RX ADMIN — Medication 81 MILLIGRAM(S): at 12:30

## 2019-11-25 RX ADMIN — GABAPENTIN 400 MILLIGRAM(S): 400 CAPSULE ORAL at 21:28

## 2019-11-25 RX ADMIN — Medication 1 PATCH: at 19:38

## 2019-11-25 RX ADMIN — Medication 650 MILLIGRAM(S): at 13:44

## 2019-11-25 RX ADMIN — Medication 100 MILLIGRAM(S): at 18:31

## 2019-11-25 NOTE — DISCHARGE NOTE PROVIDER - CARE PROVIDER_API CALL
Ezra Lane)  Critical Care Medicine; Internal Medicine; Pulmonary Disease; Sleep Medicine  [address] [place] [address]  Phone: [phone]  Fax: [phone]  Follow Up Time:

## 2019-11-25 NOTE — PROGRESS NOTE ADULT - SUBJECTIVE AND OBJECTIVE BOX
cc: sob  hpi: 64y male w/ pmh copd p/w worsening sob, cough.   Still coughing a lot this morning- productive yellow sputum.  Sob w/ cough.  Rt leg discomfort, swelling.    11/20-called b/c pt lethargic.  Pt seen at bedside- c/o feeling very tired and not sleeping well.  Discussed recent admission at other [facility] in Sept recommended cpap qhs for SHIRA.  Pt states he never started it.  No cp, sob, palp.  States cough and wheeze improving.   Again discussed etoh use and pt denies daily use- reports 3-4 drinks per week.     11/21- denies sob,  Still coughing but not productive.  Hears himself wheezing at times.   11/22- tired, not sleeping well b/c bipap.  Intermittent wheezing.  discussed steroids, lasix.  11/23- sob w/ exertion- O2 sats 91% after ambulating to bathroom w/o O2 and then 95% once 2LNC applied.  No cough.  No further wheezing.    11/24-  feeling much better today. Less sob   11/25- intermittent sob,  needs O2 sats w/ exertion to assess for home O2       REVIEW OF SYSTEMS: as per hpi, other 10 point ros negative      Vital Signs Last 24 Hrs  T(C): 36.3 (25 Nov 2019 11:16), Max: 36.7 (24 Nov 2019 19:36)  T(F): 97.3 (25 Nov 2019 11:16), Max: 98.1 (24 Nov 2019 19:36)  HR: 106 (25 Nov 2019 11:16) (86 - 113)  BP: 147/63 (25 Nov 2019 11:16) (140/74 - 154/67)  BP(mean): --  RR: 18 (25 Nov 2019 11:16) (18 - 18)  SpO2: 93% (25 Nov 2019 11:16) (93% - 99%)  on NC   T(C): 36.6 (24 Nov 2019 11:38), Max: 36.8 (23 Nov 2019 22:18)  T(F): 97.8 (24 Nov 2019 11:38), Max: 98.3 (23 Nov 2019 22:18)    PHYSICAL EXAM:  General: NAD, comfortable- seated in bed  Neuro: AAOX3, no focal deficits  HEENT: NCAT, EOMI  Neck: Soft and supple  Respiratory: cta b/l ; no w/r/r  Cardiovascular: S1 and S2, RRR  Gastrointestinal: +BS, soft, NTND  Extremities: RLE edema, erythema, warm; LLE bka  Musculoskeletal:  strength 5/5           LABS: All Labs Reviewed:    11-25    137  |  100  |  28<H>  ----------------------------<  129<H>  3.7   |  33<H>  |  0.97    Ca    8.3<L>      25 Nov 2019 06:39            Culture - Sputum . (11.19.19 @ 14:50)    Gram Stain:   Rare polymorphonuclear leukocytes per low power field  Few Squamous epithelial cells per low power field  Moderate Gram Variable Cocci per oil power field  Few Gram Negative Rods per oil power field    Specimen Source: .Sputum Sputum        < from: CT Angio Chest PE Protocol w/ IV Cont (11.18.19 @ 18:58) >    IMPRESSION:     No pulmonary embolism.  Marked cardiomegaly.  Mild interstitial lung disease.  Pulmonary hypertension.    < end of copied text >    MEDICATIONS  (STANDING):  acetaZOLAMIDE    Tablet 125 milliGRAM(s) Oral daily  ALBUTerol    90 MICROgram(s) HFA Inhaler 1 Puff(s) Inhalation every 4 hours  albuterol/ipratropium for Nebulization 3 milliLiter(s) Nebulizer every 6 hours  aspirin  chewable 81 milliGRAM(s) Oral daily  budesonide 160 MICROgram(s)/formoterol 4.5 MICROgram(s) Inhaler 2 Puff(s) Inhalation two times a day  clopidogrel Tablet 75 milliGRAM(s) Oral daily  diltiazem    milliGRAM(s) Oral daily  doxycycline hyclate Capsule 100 milliGRAM(s) Oral every 12 hours  enoxaparin Injectable 40 milliGRAM(s) SubCutaneous daily  ferrous sulfate Oral Tab/Cap - Peds 325 milliGRAM(s) Oral daily  furosemide    Tablet 40 milliGRAM(s) Oral two times a day  gabapentin 400 milliGRAM(s) Oral three times a day  guaiFENesin  milliGRAM(s) Oral every 12 hours  methylPREDNISolone sodium succinate Injectable 40 milliGRAM(s) IV Push two times a day  nicotine - 21 mG/24Hr(s) Patch 1 patch Transdermal daily  pantoprazole    Tablet 40 milliGRAM(s) Oral before breakfast  tamsulosin Oral Tab/Cap - Peds 0.4 milliGRAM(s) Oral at bedtime  tiotropium 18 MICROgram(s) Capsule 1 Capsule(s) Inhalation daily  traZODone 100 milliGRAM(s) Oral at bedtime    MEDICATIONS  (PRN):  acetaminophen   Tablet .. 650 milliGRAM(s) Oral every 6 hours PRN Temp greater or equal to 38C (100.4F), Mild Pain (1 - 3)  ALBUTerol    90 MICROgram(s) HFA Inhaler 2 Puff(s) Inhalation every 4 hours PRN Shortness of Breath            Assessment and Plan:   64y male w/     1. acute on chronic hypercapneic respiratory failure due to COPD exacerbation  - CTA no pna or PE  - IV steroids, nebs  - SHIRA on cpap  - sputum cx normal raymundo  - smoking cessation discussed (pt quit 8 days ago);  nicotine patch  11/20- more lethargic, hx shira and not using cpap, abg respiratory acidosis,  start bipap, discussed w/ Pulm /consulted  11/21- Pulm eval appreciated.   increase steroids   11/22- doxy added for possible URI  11/23- feeling better, O2 sats improving,  desats to 91% on room air after exertion  11/25- taper iv steroids, check O2 sats w/ exertion    2. acute on chronic diastolic chf  - s/p iv lasix now on po bid       3. afib  - s/p watchman device  - cardizem --> 11/22 increase cardizem for better rate control    4. dvt px  heparin sc    doppler RLE no dvt     5. hyperammonemia  - CT 5/26/19 reviewed, questionable cirrhosis and pt was due for GI f/u   - lactulose ordered, will repeat level in am--> normal.   Discussed this and CT w/ patient .  He has new GI doc that he will follow up with - reports having recent normal colonoscopy at [place]     6. SHIRA  - start NIPPV qhs      Dispo 11/25-  64y male from home w/ hypercapneic resp failure, copd exac improving w/ iv steroids.   Check O2 sats w/ exertion to assess home O2 needs.  Needs close follow up w/ Dr. Lane, sleep study, cpap to be set up outpatient. cc: sob  hpi: 64y male w/ pmh copd p/w worsening sob, cough.   Still coughing a lot this morning- productive yellow sputum.  Sob w/ cough.  Rt leg discomfort, swelling.    11/20-called b/c pt lethargic.  Pt seen at bedside- c/o feeling very tired and not sleeping well.  Discussed recent admission at other [facility] in Sept recommended cpap qhs for SHIRA.  Pt states he never started it.  No cp, sob, palp.  States cough and wheeze improving.   Again discussed etoh use and pt denies daily use- reports 3-4 drinks per week.     11/21- denies sob,  Still coughing but not productive.  Hears himself wheezing at times.   11/22- tired, not sleeping well b/c bipap.  Intermittent wheezing.  discussed steroids, lasix.  11/23- sob w/ exertion- O2 sats 91% after ambulating to bathroom w/o O2 and then 95% once 2LNC applied.  No cough.  No further wheezing.    11/24-  feeling much better today. Less sob   11/25- intermittent sob, but overall feels better.  Hoping to get home by Wednesday.   needs O2 sats w/ exertion to assess for home O2       REVIEW OF SYSTEMS: as per hpi, other 10 point ros negative      Vital Signs Last 24 Hrs  T(C): 36.3 (25 Nov 2019 11:16), Max: 36.7 (24 Nov 2019 19:36)  T(F): 97.3 (25 Nov 2019 11:16), Max: 98.1 (24 Nov 2019 19:36)  HR: 106 (25 Nov 2019 11:16) (86 - 113)  BP: 147/63 (25 Nov 2019 11:16) (140/74 - 154/67)  BP(mean): --  RR: 18 (25 Nov 2019 11:16) (18 - 18)  SpO2: 93% (25 Nov 2019 11:16) (93% - 99%)  on NC   T(C): 36.6 (24 Nov 2019 11:38), Max: 36.8 (23 Nov 2019 22:18)  T(F): 97.8 (24 Nov 2019 11:38), Max: 98.3 (23 Nov 2019 22:18)    PHYSICAL EXAM:  General: NAD, comfortable- seated in bed  Neuro: AAOX3, no focal deficits  HEENT: NCAT, EOMI  Neck: Soft and supple  Respiratory: cta b/l ; no w/r/r  Cardiovascular: S1 and S2, RRR  Gastrointestinal: +BS, soft, NTND  Extremities: RLE edema, erythema, warm; LLE bka  Musculoskeletal:  strength 5/5           LABS: All Labs Reviewed:    11-25    137  |  100  |  28<H>  ----------------------------<  129<H>  3.7   |  33<H>  |  0.97    Ca    8.3<L>      25 Nov 2019 06:39            Culture - Sputum . (11.19.19 @ 14:50)    Gram Stain:   Rare polymorphonuclear leukocytes per low power field  Few Squamous epithelial cells per low power field  Moderate Gram Variable Cocci per oil power field  Few Gram Negative Rods per oil power field    Specimen Source: .Sputum Sputum        < from: CT Angio Chest PE Protocol w/ IV Cont (11.18.19 @ 18:58) >    IMPRESSION:     No pulmonary embolism.  Marked cardiomegaly.  Mild interstitial lung disease.  Pulmonary hypertension.    < end of copied text >    MEDICATIONS  (STANDING):  acetaZOLAMIDE    Tablet 125 milliGRAM(s) Oral daily  ALBUTerol    90 MICROgram(s) HFA Inhaler 1 Puff(s) Inhalation every 4 hours  albuterol/ipratropium for Nebulization 3 milliLiter(s) Nebulizer every 6 hours  aspirin  chewable 81 milliGRAM(s) Oral daily  budesonide 160 MICROgram(s)/formoterol 4.5 MICROgram(s) Inhaler 2 Puff(s) Inhalation two times a day  clopidogrel Tablet 75 milliGRAM(s) Oral daily  diltiazem    milliGRAM(s) Oral daily  doxycycline hyclate Capsule 100 milliGRAM(s) Oral every 12 hours  enoxaparin Injectable 40 milliGRAM(s) SubCutaneous daily  ferrous sulfate Oral Tab/Cap - Peds 325 milliGRAM(s) Oral daily  furosemide    Tablet 40 milliGRAM(s) Oral two times a day  gabapentin 400 milliGRAM(s) Oral three times a day  guaiFENesin  milliGRAM(s) Oral every 12 hours  methylPREDNISolone sodium succinate Injectable 40 milliGRAM(s) IV Push two times a day  nicotine - 21 mG/24Hr(s) Patch 1 patch Transdermal daily  pantoprazole    Tablet 40 milliGRAM(s) Oral before breakfast  tamsulosin Oral Tab/Cap - Peds 0.4 milliGRAM(s) Oral at bedtime  tiotropium 18 MICROgram(s) Capsule 1 Capsule(s) Inhalation daily  traZODone 100 milliGRAM(s) Oral at bedtime    MEDICATIONS  (PRN):  acetaminophen   Tablet .. 650 milliGRAM(s) Oral every 6 hours PRN Temp greater or equal to 38C (100.4F), Mild Pain (1 - 3)  ALBUTerol    90 MICROgram(s) HFA Inhaler 2 Puff(s) Inhalation every 4 hours PRN Shortness of Breath            Assessment and Plan:   64y male w/     1. acute on chronic hypercapneic respiratory failure due to COPD exacerbation possible URI  - CTA no pna or PE  - IV steroids, nebs  - SHIRA on cpap  - sputum cx normal raymundo  - smoking cessation discussed (pt quit 8 days ago);  nicotine patch  11/20- more lethargic, hx shira and not using cpap, abg respiratory acidosis,  start bipap, discussed w/ Pulm /consulted  11/22- doxy added for possible URI  11/23- feeling better, O2 sats improving,  desats to 91% on room air after exertion  11/25- taper iv steroids, check O2 sats w/ exertion    2. acute on chronic diastolic chf  - s/p iv lasix now on po bid       3. afib  - s/p watchman device  - cardizem --> 11/22 increase cardizem for better rate control, possibly tachy due to frequent nebs    4. dvt px  heparin sc    doppler RLE no dvt     5. hyperammonemia- resolved  - CT 5/26/19 reviewed, questionable cirrhosis and pt was due for GI f/u   - lactulose ordered, will repeat level in am--> normal.   Discussed this and CT w/ patient .  He has new GI doc that he will follow up with - reports having recent normal colonoscopy at [place]         Dispo 11/25-  64y male from home w/ hypercapneic resp failure, copd exac improving w/ iv steroids.  Po doxy for possible URI.   Check O2 sats w/ exertion to assess home O2 needs.  Needs close follow up w/ Dr. Lane, sleep study, cpap to be set up outpatient.

## 2019-11-25 NOTE — DISCHARGE NOTE PROVIDER - NSDCCPCAREPLAN_GEN_ALL_CORE_FT
PRINCIPAL DISCHARGE DIAGNOSIS  Diagnosis: Respiratory failure  Assessment and Plan of Treatment: acute on chronic hypercapneic respiratory failure due to COPD exacerbation  - CTA no pna or PE  - steroids, nebs  - smoking cessation discussed  - needs outpatient sleep study, cpap        SECONDARY DISCHARGE DIAGNOSES  Diagnosis: Cirrhosis  Assessment and Plan of Treatment: we discussed  CT 5/26/19 showing questionable cirrhosis - please follow up with your GI doc for further workup    Diagnosis: Afib  Assessment and Plan of Treatment: increased dose of cardizem to 180mg for better rate control  s/p watchman device    Diagnosis: Chronic CHF  Assessment and Plan of Treatment: increased lasix to 40mg twice daily

## 2019-11-25 NOTE — PROGRESS NOTE ADULT - SUBJECTIVE AND OBJECTIVE BOX
Subjective:    pat better, no new complaint.    Home Medications:  albuterol 2.5 mg/3 mL (0.083%) inhalation solution: 3 milliliter(s) inhaled every 6 hours, As Needed -for shortness of breath and/or wheezing (18 Nov 2019 22:02)  dilTIAZem 24 hour extended release: 1 tab(s) orally once a day  **pt is unsure if its 120 or 240 mg** (18 Nov 2019 22:02)  furosemide 40 mg oral tablet: 1 tab(s) orally once a day (18 Nov 2019 22:02)  gabapentin 400 mg oral capsule: 1 cap(s) orally 3 times a day (18 Nov 2019 22:02)  pantoprazole 40 mg oral granule, enteric coated: 40 milligram(s) orally once a day (18 Nov 2019 22:02)  Spiriva 18 mcg inhalation capsule: 1 cap(s) inhaled once a day (18 Nov 2019 22:02)  tamsulosin 0.4 mg oral capsule: 1 cap(s) orally once a day (at bedtime) (18 Nov 2019 22:02)  traZODone 50 mg oral tablet: 2 tab(s) orally once a day (at bedtime), As Needed (18 Nov 2019 22:02)    MEDICATIONS  (STANDING):  acetaZOLAMIDE    Tablet 125 milliGRAM(s) Oral daily  ALBUTerol    90 MICROgram(s) HFA Inhaler 1 Puff(s) Inhalation every 4 hours  albuterol/ipratropium for Nebulization 3 milliLiter(s) Nebulizer every 6 hours  aspirin  chewable 81 milliGRAM(s) Oral daily  budesonide 160 MICROgram(s)/formoterol 4.5 MICROgram(s) Inhaler 2 Puff(s) Inhalation two times a day  clopidogrel Tablet 75 milliGRAM(s) Oral daily  diltiazem    milliGRAM(s) Oral daily  doxycycline hyclate Capsule 100 milliGRAM(s) Oral every 12 hours  enoxaparin Injectable 40 milliGRAM(s) SubCutaneous daily  ferrous sulfate Oral Tab/Cap - Peds 325 milliGRAM(s) Oral daily  furosemide    Tablet 40 milliGRAM(s) Oral two times a day  gabapentin 400 milliGRAM(s) Oral three times a day  guaiFENesin  milliGRAM(s) Oral every 12 hours  methylPREDNISolone sodium succinate Injectable 40 milliGRAM(s) IV Push two times a day  nicotine - 21 mG/24Hr(s) Patch 1 patch Transdermal daily  pantoprazole    Tablet 40 milliGRAM(s) Oral before breakfast  tamsulosin Oral Tab/Cap - Peds 0.4 milliGRAM(s) Oral at bedtime  tiotropium 18 MICROgram(s) Capsule 1 Capsule(s) Inhalation daily  traZODone 100 milliGRAM(s) Oral at bedtime    MEDICATIONS  (PRN):  acetaminophen   Tablet .. 650 milliGRAM(s) Oral every 6 hours PRN Temp greater or equal to 38C (100.4F), Mild Pain (1 - 3)  ALBUTerol    90 MICROgram(s) HFA Inhaler 2 Puff(s) Inhalation every 4 hours PRN Shortness of Breath      Allergies    Ceclor (Rash)  Duricef (Rash)    Intolerances        Vital Signs Last 24 Hrs  T(C): 36.3 (25 Nov 2019 11:16), Max: 36.7 (24 Nov 2019 19:36)  T(F): 97.3 (25 Nov 2019 11:16), Max: 98.1 (24 Nov 2019 19:36)  HR: 106 (25 Nov 2019 11:16) (86 - 113)  BP: 147/63 (25 Nov 2019 11:16) (123/78 - 154/67)  BP(mean): --  RR: 18 (25 Nov 2019 11:16) (18 - 18)  SpO2: 93% (25 Nov 2019 11:16) (93% - 99%)      PHYSICAL EXAMINATION:    NECK:  Supple. No lymphadenopathy. Jugular venous pressure not elevated. Carotids equal.   HEART:   The cardiac impulse has a normal quality. Reg., Nl S1 and S2.  There are no murmurs, rubs or gallops noted  CHEST:  Chest crackles to auscultation. Normal respiratory effort.  ABDOMEN:  Soft and nontender.   EXTREMITIES:  There is no edema.       LABS:    11-25    137  |  100  |  28<H>  ----------------------------<  129<H>  3.7   |  33<H>  |  0.97    Ca    8.3<L>      25 Nov 2019 06:39

## 2019-11-25 NOTE — DISCHARGE NOTE PROVIDER - HOSPITAL COURSE
hpi: 64y male w/ pmh copd p/w worsening sob, cough.   Still coughing a lot this morning- productive yellow sputum.  Sob w/ cough.  Rt leg discomfort, swelling.      11/20-called b/c pt lethargic.  Pt seen at bedside- c/o feeling very tired and not sleeping well.  Discussed recent admission at other [facility] facility in Sept recommended cpap qhs for VIJAY.  Pt states he never started it.  No cp, sob, palp.  States cough and wheeze improving.   Again discussed etoh use and pt denies daily use- reports 3-4 drinks per week.         11/25- intermittent sob, but overall feels better.  Hoping to get home by Wednesday.   needs O2 sats w/ exertion to assess for home O2     11.26: OSat after ambulation 92% on Room Air        Vital Signs Last 24 Hrs    T(C): 36.3 (25 Nov 2019 11:16), Max: 36.7 (24 Nov 2019 19:36)    T(F): 97.3 (25 Nov 2019 11:16), Max: 98.1 (24 Nov 2019 19:36)    HR: 106 (25 Nov 2019 11:16) (86 - 113)    BP: 147/63 (25 Nov 2019 11:16) (140/74 - 154/67)    RR: 18 (25 Nov 2019 11:16) (18 - 18)    SpO2: 93% (25 Nov 2019 11:16) (93% - 99%)  on NC     T(C): 36.6 (24 Nov 2019 11:38), Max: 36.8 (23 Nov 2019 22:18)    T(F): 97.8 (24 Nov 2019 11:38), Max: 98.3 (23 Nov 2019 22:18)        PHYSICAL EXAM:    General: NAD, comfortable- seated in bed    Neuro: AAOX3, no focal deficits    HEENT: NCAT, EOMI    Neck: Soft and supple    Respiratory: good air entry bilaterally, +mild wheezing     Cardiovascular: S1 and S2, RRR    Gastrointestinal: +BS, soft, NTND    Extremities: RLE edema, erythema, warm; LLE bka    Musculoskeletal:  strength 5/5             Assessment and Plan:         1. acute on chronic hypercapneic respiratory failure due to COPD exacerbation possible URI    - CTA no pna or PE    -c/w steroid taper as outpt , complete Doxy as outpt     - VIJAY on cpap at night    - sputum cx normal raymundo    - smoking cessation discussed (pt quit 8 days ago);  nicotine patch    -outpt f/u with pulmonary for sleep study         2. acute on chronic diastolic chf    - s/p iv lasix now on po bid     -outpt         3. afib    - s/p watchman device    - cardizem --> 11/22 increased cardizem for better rate control, possibly tachy due to frequent nebs        4. hyperammonemia- resolved    - CT 5/26/19 reviewed, questionable cirrhosis and pt was due for GI f/u     - lactulose ordered, will repeat level in am--> normal.   Discussed this and CT w/ patient .      He has new GI doc that he will follow up with - reports having recent normal colonoscopy at [facility], time 39min

## 2019-11-25 NOTE — DISCHARGE NOTE PROVIDER - NSDCMRMEDTOKEN_GEN_ALL_CORE_FT
acetaZOLAMIDE 125 mg oral tablet: 1 tab(s) orally once a day  albuterol 2.5 mg/3 mL (0.083%) inhalation solution: 3 milliliter(s) inhaled every 6 hours, As Needed -for shortness of breath and/or wheezing  aspirin 81 mg oral tablet: 1 tab(s) orally once a day  clopidogrel 75 mg oral tablet: 1 tab(s) orally once a day  dilTIAZem 180 mg/24 hours oral capsule, extended release: 1 cap(s) orally once a day  ferrous sulfate 325 mg (65 mg elemental iron) oral tablet: 1 tab(s) orally once a day -  furosemide 40 mg oral tablet: 1 tab(s) orally 2 times a day  gabapentin 400 mg oral capsule: 1 cap(s) orally 3 times a day  nicotine 21 mg/24 hr transdermal film, extended release:  transdermal   pantoprazole 40 mg oral granule, enteric coated: 40 milligram(s) orally once a day  Spiriva 18 mcg inhalation capsule: 1 cap(s) inhaled once a day  tamsulosin 0.4 mg oral capsule: 1 cap(s) orally once a day (at bedtime)  traZODone 50 mg oral tablet: 2 tab(s) orally once a day (at bedtime), As Needed acetaZOLAMIDE 125 mg oral tablet: 1 tab(s) orally once a day  albuterol 2.5 mg/3 mL (0.083%) inhalation solution: 3 milliliter(s) inhaled every 6 hours, As Needed -for shortness of breath and/or wheezing  aspirin 81 mg oral tablet: 1 tab(s) orally once a day  clopidogrel 75 mg oral tablet: 1 tab(s) orally once a day  dilTIAZem 180 mg/24 hours oral capsule, extended release: 1 cap(s) orally once a day  doxycycline monohydrate 100 mg oral capsule: 1 cap(s) orally every 12 hours  ferrous sulfate 325 mg (65 mg elemental iron) oral tablet: 1 tab(s) orally once a day -  furosemide 40 mg oral tablet: 1 tab(s) orally 2 times a day  gabapentin 400 mg oral capsule: 1 cap(s) orally 3 times a day  nicotine 21 mg/24 hr transdermal film, extended release:  transdermal   pantoprazole 40 mg oral granule, enteric coated: 40 milligram(s) orally once a day  predniSONE 10 mg oral tablet: 4 tab(s) orally once a day MDD:4tb po daily x 2d, 3tb po daily x 2d, 2tb po daily x 2d, 1tb po daily x 2d  Spiriva 18 mcg inhalation capsule: 1 cap(s) inhaled once a day  tamsulosin 0.4 mg oral capsule: 1 cap(s) orally once a day (at bedtime)  traZODone 50 mg oral tablet: 2 tab(s) orally once a day (at bedtime), As Needed

## 2019-11-25 NOTE — DISCHARGE NOTE PROVIDER - NSDCHHNEEDSERVICE_GEN_ALL_CORE
Teaching and training/Rehabilitation services/Medication teaching and assessment/Observation and assessment

## 2019-11-25 NOTE — PROGRESS NOTE ADULT - ASSESSMENT
PROBLEMS;    Ac on chronic hypercapnic & hypoxamic respiratory failure  OHS/VIJAY  AC flare of COPD/chronic vs acute on chronic bronchitis  Mild interstitial lung disease-NSIP h/o smoking  Pulmonary hypertension  Chronic afib    PLAN;    pulmonary better decd planning  po doxycylin   iv solumedrols 40mg q 12hr  aerosols  bipap qhs  Need sleep study as outpat & pap titration  supportive care  nicotine patch  weight lost  d/w pat in detail  dvt prophylasix

## 2019-11-25 NOTE — DISCHARGE NOTE PROVIDER - PROVIDER RX CONTACT NUMBER
Scripts 30 month supply eprescribed to pharmacy per MD  Pt. Transferred to PSR to schedule f/u appt  Pt verbalized understanding.  
[phone]

## 2019-11-26 ENCOUNTER — TRANSCRIPTION ENCOUNTER (OUTPATIENT)
Age: 64
End: 2019-11-26

## 2019-11-26 VITALS — RESPIRATION RATE: 21 BRPM | OXYGEN SATURATION: 92 %

## 2019-11-26 RX ADMIN — Medication 325 MILLIGRAM(S): at 12:18

## 2019-11-26 RX ADMIN — Medication 100 MILLIGRAM(S): at 05:17

## 2019-11-26 RX ADMIN — Medication 81 MILLIGRAM(S): at 12:18

## 2019-11-26 RX ADMIN — PANTOPRAZOLE SODIUM 40 MILLIGRAM(S): 20 TABLET, DELAYED RELEASE ORAL at 05:17

## 2019-11-26 RX ADMIN — ACETAZOLAMIDE 125 MILLIGRAM(S): 250 TABLET ORAL at 12:18

## 2019-11-26 RX ADMIN — ENOXAPARIN SODIUM 40 MILLIGRAM(S): 100 INJECTION SUBCUTANEOUS at 12:18

## 2019-11-26 RX ADMIN — Medication 3 MILLILITER(S): at 02:05

## 2019-11-26 RX ADMIN — GABAPENTIN 400 MILLIGRAM(S): 400 CAPSULE ORAL at 15:24

## 2019-11-26 RX ADMIN — Medication 3 MILLILITER(S): at 09:14

## 2019-11-26 RX ADMIN — Medication 650 MILLIGRAM(S): at 09:05

## 2019-11-26 RX ADMIN — Medication 1 PATCH: at 12:19

## 2019-11-26 RX ADMIN — Medication 600 MILLIGRAM(S): at 05:17

## 2019-11-26 RX ADMIN — GABAPENTIN 400 MILLIGRAM(S): 400 CAPSULE ORAL at 05:17

## 2019-11-26 RX ADMIN — Medication 180 MILLIGRAM(S): at 05:16

## 2019-11-26 RX ADMIN — Medication 40 MILLIGRAM(S): at 05:17

## 2019-11-26 RX ADMIN — CLOPIDOGREL BISULFATE 75 MILLIGRAM(S): 75 TABLET, FILM COATED ORAL at 12:18

## 2019-11-26 NOTE — PROGRESS NOTE ADULT - SUBJECTIVE AND OBJECTIVE BOX
Subjective:    pat better, sitting in bd, no new complaint.    Home Medications:  albuterol 2.5 mg/3 mL (0.083%) inhalation solution: 3 milliliter(s) inhaled every 6 hours, As Needed -for shortness of breath and/or wheezing (18 Nov 2019 22:02)  gabapentin 400 mg oral capsule: 1 cap(s) orally 3 times a day (18 Nov 2019 22:02)  nicotine 21 mg/24 hr transdermal film, extended release:  transdermal  (25 Nov 2019 16:06)  pantoprazole 40 mg oral granule, enteric coated: 40 milligram(s) orally once a day (18 Nov 2019 22:02)  Spiriva 18 mcg inhalation capsule: 1 cap(s) inhaled once a day (18 Nov 2019 22:02)  tamsulosin 0.4 mg oral capsule: 1 cap(s) orally once a day (at bedtime) (18 Nov 2019 22:02)  traZODone 50 mg oral tablet: 2 tab(s) orally once a day (at bedtime), As Needed (18 Nov 2019 22:02)    MEDICATIONS  (STANDING):  acetaZOLAMIDE    Tablet 125 milliGRAM(s) Oral daily  ALBUTerol    90 MICROgram(s) HFA Inhaler 1 Puff(s) Inhalation every 4 hours  albuterol/ipratropium for Nebulization 3 milliLiter(s) Nebulizer every 6 hours  aspirin  chewable 81 milliGRAM(s) Oral daily  budesonide 160 MICROgram(s)/formoterol 4.5 MICROgram(s) Inhaler 2 Puff(s) Inhalation two times a day  clopidogrel Tablet 75 milliGRAM(s) Oral daily  diltiazem    milliGRAM(s) Oral daily  doxycycline hyclate Capsule 100 milliGRAM(s) Oral every 12 hours  enoxaparin Injectable 40 milliGRAM(s) SubCutaneous daily  ferrous sulfate Oral Tab/Cap - Peds 325 milliGRAM(s) Oral daily  furosemide    Tablet 40 milliGRAM(s) Oral two times a day  gabapentin 400 milliGRAM(s) Oral three times a day  guaiFENesin  milliGRAM(s) Oral every 12 hours  methylPREDNISolone sodium succinate Injectable 40 milliGRAM(s) IV Push two times a day  nicotine - 21 mG/24Hr(s) Patch 1 patch Transdermal daily  pantoprazole    Tablet 40 milliGRAM(s) Oral before breakfast  tamsulosin Oral Tab/Cap - Peds 0.4 milliGRAM(s) Oral at bedtime  tiotropium 18 MICROgram(s) Capsule 1 Capsule(s) Inhalation daily  traZODone 100 milliGRAM(s) Oral at bedtime    MEDICATIONS  (PRN):  acetaminophen   Tablet .. 650 milliGRAM(s) Oral every 6 hours PRN Temp greater or equal to 38C (100.4F), Mild Pain (1 - 3)  ALBUTerol    90 MICROgram(s) HFA Inhaler 2 Puff(s) Inhalation every 4 hours PRN Shortness of Breath      Allergies    Ceclor (Rash)  Duricef (Rash)    Intolerances        Vital Signs Last 24 Hrs  T(C): 36.6 (26 Nov 2019 05:14), Max: 36.6 (26 Nov 2019 05:14)  T(F): 97.8 (26 Nov 2019 05:14), Max: 97.8 (26 Nov 2019 05:14)  HR: 84 (26 Nov 2019 09:14) (84 - 104)  BP: 139/71 (26 Nov 2019 05:14) (139/71 - 154/85)  BP(mean): --  RR: 18 (26 Nov 2019 05:14) (18 - 18)  SpO2: 95% (26 Nov 2019 05:14) (95% - 98%)      PHYSICAL EXAMINATION:    NECK:  Supple. No lymphadenopathy. Jugular venous pressure not elevated. Carotids equal.   HEART:   The cardiac impulse has a normal quality. Reg., Nl S1 and S2.  There are no murmurs, rubs or gallops noted  CHEST:  Chest crackles to auscultation. Normal respiratory effort.  ABDOMEN:  Soft and nontender.   EXTREMITIES:  There is no edema.       LABS:    11-25    137  |  100  |  28<H>  ----------------------------<  129<H>  3.7   |  33<H>  |  0.97    Ca    8.3<L>      25 Nov 2019 06:39

## 2019-11-26 NOTE — PROGRESS NOTE ADULT - ASSESSMENT
PROBLEMS;    Ac on chronic hypercapnic & hypoxamic respiratory failure  OHS/VIJAY  AC flare of COPD/chronic vs acute on chronic bronchitis  Mild interstitial lung disease-NSIP h/o smoking  Pulmonary hypertension  Chronic afib    PLAN;    pulmonary better decd planning  po doxycylin   iv solumedrols 40mg q 12hr  aerosols  Need sleep study as outpat & pap titration  supportive care  nicotine patch  weight lost  d/w pat in detail  dvt prophylasix

## 2019-11-26 NOTE — DISCHARGE NOTE NURSING/CASE MANAGEMENT/SOCIAL WORK - PATIENT PORTAL LINK FT
You can access the FollowMyHealth Patient Portal offered by [facility] by registering at the following website: [email or web address]. By joining [facility]’s FollowMyHealth portal, you will also be able to view your health information using other applications (apps) compatible with our system.

## 2019-11-26 NOTE — PROGRESS NOTE ADULT - REASON FOR ADMISSION
COPD exacerbation

## 2019-12-02 DIAGNOSIS — N40.0 BENIGN PROSTATIC HYPERPLASIA WITHOUT LOWER URINARY TRACT SYMPTOMS: ICD-10-CM

## 2019-12-02 DIAGNOSIS — J96.22 ACUTE AND CHRONIC RESPIRATORY FAILURE WITH HYPERCAPNIA: ICD-10-CM

## 2019-12-02 DIAGNOSIS — G47.33 OBSTRUCTIVE SLEEP APNEA (ADULT) (PEDIATRIC): ICD-10-CM

## 2019-12-02 DIAGNOSIS — E72.20 DISORDER OF UREA CYCLE METABOLISM, UNSPECIFIED: ICD-10-CM

## 2019-12-02 DIAGNOSIS — I48.20 CHRONIC ATRIAL FIBRILLATION, UNSPECIFIED: ICD-10-CM

## 2019-12-02 DIAGNOSIS — I11.0 HYPERTENSIVE HEART DISEASE WITH HEART FAILURE: ICD-10-CM

## 2019-12-02 DIAGNOSIS — J84.9 INTERSTITIAL PULMONARY DISEASE, UNSPECIFIED: ICD-10-CM

## 2019-12-02 DIAGNOSIS — F10.10 ALCOHOL ABUSE, UNCOMPLICATED: ICD-10-CM

## 2019-12-02 DIAGNOSIS — J06.9 ACUTE UPPER RESPIRATORY INFECTION, UNSPECIFIED: ICD-10-CM

## 2019-12-02 DIAGNOSIS — F17.210 NICOTINE DEPENDENCE, CIGARETTES, UNCOMPLICATED: ICD-10-CM

## 2019-12-02 DIAGNOSIS — I50.33 ACUTE ON CHRONIC DIASTOLIC (CONGESTIVE) HEART FAILURE: ICD-10-CM

## 2019-12-02 DIAGNOSIS — I27.20 PULMONARY HYPERTENSION, UNSPECIFIED: ICD-10-CM

## 2019-12-02 DIAGNOSIS — K74.60 UNSPECIFIED CIRRHOSIS OF LIVER: ICD-10-CM

## 2019-12-02 DIAGNOSIS — J96.21 ACUTE AND CHRONIC RESPIRATORY FAILURE WITH HYPOXIA: ICD-10-CM

## 2019-12-02 DIAGNOSIS — D69.6 THROMBOCYTOPENIA, UNSPECIFIED: ICD-10-CM

## 2019-12-02 DIAGNOSIS — K21.9 GASTRO-ESOPHAGEAL REFLUX DISEASE WITHOUT ESOPHAGITIS: ICD-10-CM

## 2019-12-02 DIAGNOSIS — J44.1 CHRONIC OBSTRUCTIVE PULMONARY DISEASE WITH (ACUTE) EXACERBATION: ICD-10-CM

## 2019-12-10 ENCOUNTER — APPOINTMENT (OUTPATIENT)
Dept: FAMILY MEDICINE | Facility: CLINIC | Age: 64
End: 2019-12-10
Payer: MEDICARE

## 2019-12-10 VITALS
DIASTOLIC BLOOD PRESSURE: 80 MMHG | HEART RATE: 115 BPM | WEIGHT: 272 LBS | BODY MASS INDEX: 36.84 KG/M2 | OXYGEN SATURATION: 94 % | HEIGHT: 72 IN | SYSTOLIC BLOOD PRESSURE: 132 MMHG

## 2019-12-10 VITALS — OXYGEN SATURATION: 95 %

## 2019-12-10 PROCEDURE — 99495 TRANSJ CARE MGMT MOD F2F 14D: CPT | Mod: 25

## 2019-12-10 PROCEDURE — 36415 COLL VENOUS BLD VENIPUNCTURE: CPT

## 2019-12-10 NOTE — ADDENDUM
[FreeTextEntry1] : I, Mary Carmona acting as a scribe for Dr. Augusta Vasquez on Dec 10, 2019  at 1:50 PM\par

## 2019-12-10 NOTE — PHYSICAL EXAM
[No Acute Distress] : no acute distress [Well Developed] : well developed [Well Nourished] : well nourished [Well-Appearing] : well-appearing [PERRL] : pupils equal round and reactive to light [Normal Sclera/Conjunctiva] : normal sclera/conjunctiva [EOMI] : extraocular movements intact [Normal Oropharynx] : the oropharynx was normal [Normal Outer Ear/Nose] : the outer ears and nose were normal in appearance [Supple] : supple [No JVD] : no jugular venous distention [No Lymphadenopathy] : no lymphadenopathy [No Respiratory Distress] : no respiratory distress  [Thyroid Normal, No Nodules] : the thyroid was normal and there were no nodules present [Normal Rate] : normal rate  [Clear to Auscultation] : lungs were clear to auscultation bilaterally [No Accessory Muscle Use] : no accessory muscle use [No Murmur] : no murmur heard [Normal S1, S2] : normal S1 and S2 [Regular Rhythm] : with a regular rhythm [No Carotid Bruits] : no carotid bruits [No Abdominal Bruit] : a ~M bruit was not heard ~T in the abdomen [Pedal Pulses Present] : the pedal pulses are present [No Varicosities] : no varicosities [No Edema] : there was no peripheral edema [No Palpable Aorta] : no palpable aorta [No Extremity Clubbing/Cyanosis] : no extremity clubbing/cyanosis [Non Tender] : non-tender [Soft] : abdomen soft [Non-distended] : non-distended [No Masses] : no abdominal mass palpated [No HSM] : no HSM [Normal Bowel Sounds] : normal bowel sounds [Normal Posterior Cervical Nodes] : no posterior cervical lymphadenopathy [Normal Anterior Cervical Nodes] : no anterior cervical lymphadenopathy [No CVA Tenderness] : no CVA  tenderness [No Spinal Tenderness] : no spinal tenderness [No Joint Swelling] : no joint swelling [Coordination Grossly Intact] : coordination grossly intact [Grossly Normal Strength/Tone] : grossly normal strength/tone [No Rash] : no rash [No Focal Deficits] : no focal deficits [Normal Gait] : normal gait [Deep Tendon Reflexes (DTR)] : deep tendon reflexes were 2+ and symmetric [Normal Affect] : the affect was normal [Normal Insight/Judgement] : insight and judgment were intact

## 2019-12-10 NOTE — PLAN
[FreeTextEntry1] : - Blood work today \par - Follow up with cardiologist\par - Follow up with pulmonologist \par - Lidocaine patches ordered\par

## 2019-12-10 NOTE — HISTORY OF PRESENT ILLNESS
[Admitted on: ___] : The patient was admitted on [unfilled] [Post-hospitalization from ___ Hospital] : Post-hospitalization from [unfilled] Hospital [Discharged on ___] : discharged on [unfilled] [Discharge Summary] : discharge summary [Radiology Findings] : radiology findings [Discharge Med List] : discharge medication list [Pertinent Labs] : pertinent labs [Med Reconciliation] : medication reconciliation has been completed [Patient Contacted By: ____] : and contacted by [unfilled] [FreeTextEntry2] : STONE is a 64 year male here for HFU. Pt was brought [facility] via ambulance from my office 11/18/19 due to elevated HR and low O2. Pt was admitted 11/18/19 due to COPD exacerbation. Off note, he was treated with IV steroids, nebulizers and bipap. He was discharged 11/26/19 with albuterol inhaler and Prednisone was encouraged to have sleep study as he is non compliant with CPEP machine. He has finished Prednisone course. Taking Furosemide 40 mg x2 day and Diltiazem was increased from 90 mg to 120 mg. Reports he has no f/u appointments set up yet. Reports he is smoking very little. Is wearing nicotine patches. Pt c/o phantom pain and is requesting lidocaine patches. Pt has home nurse coming x1 month. Reports he is weighing himself a few times a week.

## 2019-12-10 NOTE — END OF VISIT
[FreeTextEntry3] : Medical record entries made by the scribe today today, were at my direction and personally dictated to them by me, Dr. Augusta Vasquez on Dec 10, 2019. I have reviewed the chart and agree that the record accurately reflects my personal performance of the history, physical exam, assessment, and plan.\par \par

## 2019-12-11 LAB
ALBUMIN SERPL ELPH-MCNC: 3.9 G/DL
ALP BLD-CCNC: 123 U/L
ALT SERPL-CCNC: 13 U/L
ANION GAP SERPL CALC-SCNC: 10 MMOL/L
AST SERPL-CCNC: 12 U/L
BASOPHILS # BLD AUTO: 0.02 K/UL
BASOPHILS NFR BLD AUTO: 0.3 %
BILIRUB SERPL-MCNC: 0.7 MG/DL
BUN SERPL-MCNC: 21 MG/DL
CALCIUM SERPL-MCNC: 9.1 MG/DL
CHLORIDE SERPL-SCNC: 106 MMOL/L
CO2 SERPL-SCNC: 26 MMOL/L
CREAT SERPL-MCNC: 0.8 MG/DL
EOSINOPHIL # BLD AUTO: 0.18 K/UL
EOSINOPHIL NFR BLD AUTO: 2.6 %
GLUCOSE SERPL-MCNC: 112 MG/DL
HCT VFR BLD CALC: 44.4 %
HGB BLD-MCNC: 14 G/DL
IMM GRANULOCYTES NFR BLD AUTO: 0.3 %
LYMPHOCYTES # BLD AUTO: 1.1 K/UL
LYMPHOCYTES NFR BLD AUTO: 15.8 %
MAN DIFF?: NORMAL
MCHC RBC-ENTMCNC: 29.7 PG
MCHC RBC-ENTMCNC: 31.5 GM/DL
MCV RBC AUTO: 94.3 FL
MONOCYTES # BLD AUTO: 0.46 K/UL
MONOCYTES NFR BLD AUTO: 6.6 %
NEUTROPHILS # BLD AUTO: 5.2 K/UL
NEUTROPHILS NFR BLD AUTO: 74.4 %
NT-PROBNP SERPL-MCNC: 693 PG/ML
PLATELET # BLD AUTO: 119 K/UL
POTASSIUM SERPL-SCNC: 4.1 MMOL/L
PROT SERPL-MCNC: 6.2 G/DL
RBC # BLD: 4.71 M/UL
RBC # FLD: 18.3 %
SODIUM SERPL-SCNC: 142 MMOL/L
WBC # FLD AUTO: 6.98 K/UL

## 2020-01-02 NOTE — PROGRESS NOTE ADULT - PROBLEM SELECTOR PLAN 1
Pt was last seen on 11/02/2018 pt has any appointment scheduled for 02/25/2020 Patient  had fever  this am. Sepsis workup as per ICU team  PEG site looks good.   may start tube feeds and meds

## 2020-01-14 ENCOUNTER — APPOINTMENT (OUTPATIENT)
Dept: FAMILY MEDICINE | Facility: CLINIC | Age: 65
End: 2020-01-14

## 2020-02-23 ENCOUNTER — RX RENEWAL (OUTPATIENT)
Age: 65
End: 2020-02-23

## 2020-03-11 NOTE — PROGRESS NOTE ADULT - SUBJECTIVE AND OBJECTIVE BOX
Patient is a 62y old  Male who presents with a chief complaint of Shortness of breath (30 Oct 2017 15:03)      BRIEF HOSPITAL COURSE: 61 y/o M with a h/o COPD, a-fib (no a/c), CHF, alcoholic cirrhosis, EtOH abuse, EtOH withdrawal (intubated in past for airway protection), initially admitted on 10/28 for COPD exacerbation and alcohol intoxication, signed out AMA and was found later on in hospital disoriented. Readmitted and RRT called later on when patient became tremulous, agitated, delirious, and began hallucinating. Transferred to MICU for further management with benzos and precedex gtt. Patient became obtunded, lost ability to protect airway, and was subsequently intubated. As per report, patient is highly sensitive to benzodiazepines.  CT head neg for acute events. Course complicated by a-fib with RVR, sepsis, pseudomonas aspiration pneumonia. Self extubated on 11/2 without need for reintubation initially but on the morning of 11/4 patient obtunded  with difficulty managing secretions. Reintubated for airway protection. Pt has had recurrent ruptured  balloons and ETT breakage.  He had failed his weaning trials and had a tracheostomy placed on Thurs. 11/9.  He still has NGT and will eventually need PEG placement.      Events last 24 hours: Trach, continued weaning trials, mental state somewhat improving    PAST MEDICAL & SURGICAL HISTORY:  Falls  Meningitis  Collapsed lung  Alcohol withdrawal  Emphysema of lung  ETOH abuse  Cirrhosis  Afib  CHF (congestive heart failure)  Poor historian  Alcohol abuse  Chronic atrial fibrillation  S/P BKA (below knee amputation), left: secondary to worsening infection in lower leg. Had both ankle injuries from fall s/p repair - left had complication.  S/P BKA (below knee amputation) unilateral, left      Review of Systems:  Cannot obtain pt  trached.    Medications:    furosemide    Tablet 20 milliGRAM(s) Oral daily  lisinopril 5 milliGRAM(s) Oral daily  metoprolol     tartrate 50 milliGRAM(s) Oral every 8 hours  tamsulosin 0.4 milliGRAM(s) Oral at bedtime    ALBUTerol    0.083% 2.5 milliGRAM(s) Nebulizer every 4 hours PRN  ALBUTerol/ipratropium for Nebulization 3 milliLiter(s) Nebulizer every 6 hours    acetaminophen    Suspension 650 milliGRAM(s) Oral every 6 hours PRN  acetaminophen    Suspension 650 milliGRAM(s) Oral every 6 hours PRN  ondansetron Injectable 8 milliGRAM(s) IV Push every 6 hours PRN  propofol Infusion 20 MICROgram(s)/kG/Min IV Continuous <Continuous>  scopolamine   Patch 1.5 milliGRAM(s) Transdermal every 3 days  traZODone 50 milliGRAM(s) Oral at bedtime      aspirin  chewable 81 milliGRAM(s) Oral daily  enoxaparin Injectable 40 milliGRAM(s) SubCutaneous daily    bisacodyl Suppository 10 milliGRAM(s) Rectal daily PRN  lactulose Syrup 10 Gram(s) Oral every 12 hours  pantoprazole  Injectable 40 milliGRAM(s) IV Push daily        folic acid 1 milliGRAM(s) Oral daily  multivitamin 1 Tablet(s) Oral daily    influenza   Vaccine 0.5 milliLiter(s) IntraMuscular once    chlorhexidine 0.12% Liquid 15 milliLiter(s) Swish and Spit two times a day    nicotine - 21 mG/24Hr(s) Patch 1 patch Transdermal daily      Mode: AC/ CMV (Assist Control/ Continuous Mandatory Ventilation)  RR (machine): 12  TV (machine): 480  FiO2: 30  PEEP: 5  MAP: 12  PIP: 32    ICU Vital Signs Last 24 Hrs  T(C): 38.3 (11 Nov 2017 12:07), Max: 38.3 (11 Nov 2017 12:07)  T(F): 100.9 (11 Nov 2017 12:07), Max: 100.9 (11 Nov 2017 12:07)  HR: 103 (11 Nov 2017 16:43) (84 - 127)  BP: 102/58 (11 Nov 2017 16:00) (91/54 - 146/96)  BP(mean): 76 (11 Nov 2017 16:00) (67 - 111)  ABP: --  ABP(mean): --  RR: 25 (11 Nov 2017 16:08) (13 - 26)  SpO2: 96% (11 Nov 2017 16:43) (94% - 100%)    MEDICATIONS  (STANDING):  ALBUTerol/ipratropium for Nebulization 3 milliLiter(s) Nebulizer every 6 hours  aspirin  chewable 81 milliGRAM(s) Oral daily  chlorhexidine 0.12% Liquid 15 milliLiter(s) Swish and Spit two times a day  enoxaparin Injectable 40 milliGRAM(s) SubCutaneous daily  folic acid 1 milliGRAM(s) Oral daily  furosemide    Tablet 20 milliGRAM(s) Oral daily  influenza   Vaccine 0.5 milliLiter(s) IntraMuscular once  lactulose Syrup 10 Gram(s) Oral every 12 hours  lisinopril 5 milliGRAM(s) Oral daily  metoprolol     tartrate 50 milliGRAM(s) Oral every 8 hours  multivitamin 1 Tablet(s) Oral daily  nicotine - 21 mG/24Hr(s) Patch 1 patch Transdermal daily  pantoprazole  Injectable 40 milliGRAM(s) IV Push daily  tamsulosin 0.4 milliGRAM(s) Oral at bedtime                 11.4   10.8   )----------(  239       ( 11 Nov 2017 06:44 )               35.8      144    |  105    |  22.0   ----------------------------<  116        ( 11 Nov 2017 06:44 )  4.2     |  29.0   |  0.57     Ca    8.8        ( 11 Nov 2017 06:44 )  Phos  2.7       ( 11 Nov 2017 06:44 )  Mg     2.0       ( 11 Nov 2017 06:44 )            CAPILLARY BLOOD GLUCOSE          Physical Examination:    General: No acute distress.  Awake, follows some simple commands today.  MARIE    HEENT: Pupils equal, reactive to light.  Symmetric.  8 shiley trach in place minimal blood at insertion site    PULM: Clear to auscultation bilaterally, no significant sputum production    CVS: irregular rate, +S1S2, +3/6 systolic murmur    ABD: Soft, nondistended, nontender, normoactive bowel sounds, no masses    EXT: No edema, nontender, L BKA    SKIN: Warm and well perfused, no rashes noted.    NEURO: much more alert today    RADIOLOGY:     CRITICAL CARE TIME SPENT: 60 Patient is a 62y old  Male who presents with a chief complaint of Shortness of breath (30 Oct 2017 15:03)      BRIEF HOSPITAL COURSE: 61 y/o M with a h/o COPD, a-fib (no a/c), CHF, alcoholic cirrhosis, EtOH abuse, EtOH withdrawal (intubated in past for airway protection), initially admitted on 10/28 for COPD exacerbation and alcohol intoxication, signed out AMA and was found later on in hospital disoriented. Readmitted and RRT called later on when patient became tremulous, agitated, delirious, and began hallucinating. Transferred to MICU for further management with benzos and precedex gtt. Patient became obtunded, lost ability to protect airway, and was subsequently intubated. As per report, patient is highly sensitive to benzodiazepines.  CT head neg for acute events. Course complicated by a-fib with RVR, sepsis, pseudomonas aspiration pneumonia. Self extubated on 11/2 without need for reintubation initially but on the morning of 11/4 patient obtunded  with difficulty managing secretions. Reintubated for airway protection. Pt has had recurrent ruptured  balloons and ETT breakage.  He had failed his weaning trials and had a tracheostomy placed on Thurs. 11/9.  He still has NGT and will eventually need PEG placement.      Events last 24 hours: Trach, continued weaning trials, mental state somewhat improving    PAST MEDICAL & SURGICAL HISTORY:  Falls  Meningitis  Collapsed lung  Alcohol withdrawal  Emphysema of lung  ETOH abuse  Cirrhosis  Afib  CHF (congestive heart failure)  Poor historian  Alcohol abuse  Chronic atrial fibrillation  S/P BKA (below knee amputation), left: secondary to worsening infection in lower leg. Had both ankle injuries from fall s/p repair - left had complication.  S/P BKA (below knee amputation) unilateral, left      Review of Systems:  Cannot obtain pt  trached.    Mode: AC/ CMV (Assist Control/ Continuous Mandatory Ventilation)  RR (machine): 12  TV (machine): 480  FiO2: 30  PEEP: 5  MAP: 12  PIP: 32    ICU Vital Signs Last 24 Hrs  T(C): 38.3 (11 Nov 2017 12:07), Max: 38.3 (11 Nov 2017 12:07)  T(F): 100.9 (11 Nov 2017 12:07), Max: 100.9 (11 Nov 2017 12:07)  HR: 103 (11 Nov 2017 16:43) (84 - 127)  BP: 102/58 (11 Nov 2017 16:00) (91/54 - 146/96)  BP(mean): 76 (11 Nov 2017 16:00) (67 - 111)  ABP: --  ABP(mean): --  RR: 25 (11 Nov 2017 16:08) (13 - 26)  SpO2: 96% (11 Nov 2017 16:43) (94% - 100%)    MEDICATIONS  (STANDING):  ALBUTerol/ipratropium for Nebulization 3 milliLiter(s) Nebulizer every 6 hours  aspirin  chewable 81 milliGRAM(s) Oral daily  chlorhexidine 0.12% Liquid 15 milliLiter(s) Swish and Spit two times a day  enoxaparin Injectable 40 milliGRAM(s) SubCutaneous daily  folic acid 1 milliGRAM(s) Oral daily  furosemide    Tablet 20 milliGRAM(s) Oral daily  influenza   Vaccine 0.5 milliLiter(s) IntraMuscular once  lactulose Syrup 10 Gram(s) Oral every 12 hours  lisinopril 5 milliGRAM(s) Oral daily  metoprolol     tartrate 50 milliGRAM(s) Oral every 8 hours  multivitamin 1 Tablet(s) Oral daily  nicotine - 21 mG/24Hr(s) Patch 1 patch Transdermal daily  pantoprazole  Injectable 40 milliGRAM(s) IV Push daily  tamsulosin 0.4 milliGRAM(s) Oral at bedtime                 11.4   10.8   )----------(  239       ( 11 Nov 2017 06:44 )               35.8      144    |  105    |  22.0   ----------------------------<  116        ( 11 Nov 2017 06:44 )  4.2     |  29.0   |  0.57     Ca    8.8        ( 11 Nov 2017 06:44 )  Phos  2.7       ( 11 Nov 2017 06:44 )  Mg     2.0       ( 11 Nov 2017 06:44 )            CAPILLARY BLOOD GLUCOSE          Physical Examination:    General: No acute distress.  Awake, follows some simple commands today.  MARIE    HEENT: Pupils equal, reactive to light.  Symmetric.  8 shiley trach in place minimal blood at insertion site    PULM: Clear to auscultation bilaterally, no significant sputum production    CVS: irregular rate, +S1S2, +3/6 systolic murmur    ABD: Soft, nondistended, nontender, normoactive bowel sounds, no masses    EXT: No edema, nontender, L BKA    SKIN: Warm and well perfused, no rashes noted.    NEURO: much more alert today    RADIOLOGY:     CRITICAL CARE TIME SPENT: 60 Unna Boot Text: An Unna boot was placed to help immobilize the limb and facilitate more rapid healing.

## 2020-03-30 ENCOUNTER — RX RENEWAL (OUTPATIENT)
Age: 65
End: 2020-03-30

## 2020-04-07 ENCOUNTER — RX RENEWAL (OUTPATIENT)
Age: 65
End: 2020-04-07

## 2020-04-18 ENCOUNTER — INPATIENT (INPATIENT)
Facility: HOSPITAL | Age: 65
LOS: 80 days | Discharge: NURSING, REHAB OR LONG-TERM CARE | DRG: 4 | End: 2020-07-08
Attending: INTERNAL MEDICINE | Admitting: INTERNAL MEDICINE
Payer: MEDICARE

## 2020-04-18 VITALS
HEIGHT: 72 IN | DIASTOLIC BLOOD PRESSURE: 85 MMHG | SYSTOLIC BLOOD PRESSURE: 161 MMHG | TEMPERATURE: 99 F | HEART RATE: 99 BPM | OXYGEN SATURATION: 97 % | RESPIRATION RATE: 25 BRPM | WEIGHT: 259.93 LBS

## 2020-04-18 DIAGNOSIS — Z88.8 ALLERGY STATUS TO OTHER DRUGS, MEDICAMENTS AND BIOLOGICAL SUBSTANCES: ICD-10-CM

## 2020-04-18 DIAGNOSIS — Z95.818 PRESENCE OF OTHER CARDIAC IMPLANTS AND GRAFTS: Chronic | ICD-10-CM

## 2020-04-18 DIAGNOSIS — Z89.512 ACQUIRED ABSENCE OF LEFT LEG BELOW KNEE: Chronic | ICD-10-CM

## 2020-04-18 DIAGNOSIS — R06.03 ACUTE RESPIRATORY DISTRESS: ICD-10-CM

## 2020-04-18 DIAGNOSIS — S88.119A COMPLETE TRAUMATIC AMPUTATION AT LEVEL BETWEEN KNEE AND ANKLE, UNSPECIFIED LOWER LEG, INITIAL ENCOUNTER: Chronic | ICD-10-CM

## 2020-04-18 LAB
ADD ON TEST-SPECIMEN IN LAB: SIGNIFICANT CHANGE UP
ALBUMIN SERPL ELPH-MCNC: 3.7 G/DL — SIGNIFICANT CHANGE UP (ref 3.3–5)
ALP SERPL-CCNC: 162 U/L — HIGH (ref 40–120)
ALT FLD-CCNC: 22 U/L — SIGNIFICANT CHANGE UP (ref 12–78)
ANION GAP SERPL CALC-SCNC: 7 MMOL/L — SIGNIFICANT CHANGE UP (ref 5–17)
APPEARANCE UR: CLEAR — SIGNIFICANT CHANGE UP
APTT BLD: 34.8 SEC — SIGNIFICANT CHANGE UP (ref 27.5–36.3)
AST SERPL-CCNC: 26 U/L — SIGNIFICANT CHANGE UP (ref 15–37)
BASE EXCESS BLDA CALC-SCNC: -1.7 MMOL/L — SIGNIFICANT CHANGE UP (ref -2–2)
BASOPHILS # BLD AUTO: 0.08 K/UL — SIGNIFICANT CHANGE UP (ref 0–0.2)
BASOPHILS NFR BLD AUTO: 1 % — SIGNIFICANT CHANGE UP (ref 0–2)
BILIRUB SERPL-MCNC: 0.5 MG/DL — SIGNIFICANT CHANGE UP (ref 0.2–1.2)
BILIRUB UR-MCNC: NEGATIVE — SIGNIFICANT CHANGE UP
BLOOD GAS COMMENTS ARTERIAL: SIGNIFICANT CHANGE UP
BUN SERPL-MCNC: 9 MG/DL — SIGNIFICANT CHANGE UP (ref 7–23)
CALCIUM SERPL-MCNC: 8.3 MG/DL — LOW (ref 8.5–10.1)
CHLORIDE SERPL-SCNC: 102 MMOL/L — SIGNIFICANT CHANGE UP (ref 96–108)
CK SERPL-CCNC: 302 U/L — SIGNIFICANT CHANGE UP (ref 26–308)
CO2 SERPL-SCNC: 27 MMOL/L — SIGNIFICANT CHANGE UP (ref 22–31)
COLOR SPEC: YELLOW — SIGNIFICANT CHANGE UP
CREAT SERPL-MCNC: 0.72 MG/DL — SIGNIFICANT CHANGE UP (ref 0.5–1.3)
D DIMER BLD IA.RAPID-MCNC: <150 NG/ML DDU — SIGNIFICANT CHANGE UP
DIFF PNL FLD: ABNORMAL
EOSINOPHIL # BLD AUTO: 0.46 K/UL — SIGNIFICANT CHANGE UP (ref 0–0.5)
EOSINOPHIL NFR BLD AUTO: 5.7 % — SIGNIFICANT CHANGE UP (ref 0–6)
GAS PNL BLDA: SIGNIFICANT CHANGE UP
GLUCOSE SERPL-MCNC: 141 MG/DL — HIGH (ref 70–99)
GLUCOSE UR QL: NEGATIVE MG/DL — SIGNIFICANT CHANGE UP
HCO3 BLDA-SCNC: 26 MMOL/L — SIGNIFICANT CHANGE UP (ref 21–29)
HCT VFR BLD CALC: 47.3 % — SIGNIFICANT CHANGE UP (ref 39–50)
HGB BLD-MCNC: 15.4 G/DL — SIGNIFICANT CHANGE UP (ref 13–17)
IMM GRANULOCYTES NFR BLD AUTO: 0.4 % — SIGNIFICANT CHANGE UP (ref 0–1.5)
INR BLD: 1.05 RATIO — SIGNIFICANT CHANGE UP (ref 0.88–1.16)
KETONES UR-MCNC: NEGATIVE — SIGNIFICANT CHANGE UP
LACTATE SERPL-SCNC: 1.5 MMOL/L — SIGNIFICANT CHANGE UP (ref 0.7–2)
LDH SERPL L TO P-CCNC: 267 U/L — HIGH (ref 84–241)
LEUKOCYTE ESTERASE UR-ACNC: NEGATIVE — SIGNIFICANT CHANGE UP
LYMPHOCYTES # BLD AUTO: 0.94 K/UL — LOW (ref 1–3.3)
LYMPHOCYTES # BLD AUTO: 11.6 % — LOW (ref 13–44)
MCHC RBC-ENTMCNC: 32.2 PG — SIGNIFICANT CHANGE UP (ref 27–34)
MCHC RBC-ENTMCNC: 32.6 GM/DL — SIGNIFICANT CHANGE UP (ref 32–36)
MCV RBC AUTO: 98.7 FL — SIGNIFICANT CHANGE UP (ref 80–100)
MONOCYTES # BLD AUTO: 0.66 K/UL — SIGNIFICANT CHANGE UP (ref 0–0.9)
MONOCYTES NFR BLD AUTO: 8.1 % — SIGNIFICANT CHANGE UP (ref 2–14)
NEUTROPHILS # BLD AUTO: 5.94 K/UL — SIGNIFICANT CHANGE UP (ref 1.8–7.4)
NEUTROPHILS NFR BLD AUTO: 73.2 % — SIGNIFICANT CHANGE UP (ref 43–77)
NITRITE UR-MCNC: NEGATIVE — SIGNIFICANT CHANGE UP
PCO2 BLDA: 57 MMHG — HIGH (ref 32–46)
PH BLDA: 7.28 — LOW (ref 7.35–7.45)
PH UR: 6.5 — SIGNIFICANT CHANGE UP (ref 5–8)
PLATELET # BLD AUTO: 144 K/UL — LOW (ref 150–400)
PO2 BLDA: 163 MMHG — HIGH (ref 74–108)
POTASSIUM SERPL-MCNC: 3.8 MMOL/L — SIGNIFICANT CHANGE UP (ref 3.5–5.3)
POTASSIUM SERPL-SCNC: 3.8 MMOL/L — SIGNIFICANT CHANGE UP (ref 3.5–5.3)
PROCALCITONIN SERPL-MCNC: 0.07 NG/ML — SIGNIFICANT CHANGE UP (ref 0.02–0.1)
PROT SERPL-MCNC: 8 GM/DL — SIGNIFICANT CHANGE UP (ref 6–8.3)
PROT UR-MCNC: NEGATIVE MG/DL — SIGNIFICANT CHANGE UP
PROTHROM AB SERPL-ACNC: 11.7 SEC — SIGNIFICANT CHANGE UP (ref 10–12.9)
RBC # BLD: 4.79 M/UL — SIGNIFICANT CHANGE UP (ref 4.2–5.8)
RBC # FLD: 13.2 % — SIGNIFICANT CHANGE UP (ref 10.3–14.5)
SAO2 % BLDA: 99 % — HIGH (ref 92–96)
SARS-COV-2 RNA SPEC QL NAA+PROBE: SIGNIFICANT CHANGE UP
SODIUM SERPL-SCNC: 136 MMOL/L — SIGNIFICANT CHANGE UP (ref 135–145)
SP GR SPEC: 1.01 — SIGNIFICANT CHANGE UP (ref 1.01–1.02)
TROPONIN I SERPL-MCNC: <0.015 NG/ML — SIGNIFICANT CHANGE UP (ref 0.01–0.04)
UROBILINOGEN FLD QL: NEGATIVE MG/DL — SIGNIFICANT CHANGE UP
WBC # BLD: 8.11 K/UL — SIGNIFICANT CHANGE UP (ref 3.8–10.5)
WBC # FLD AUTO: 8.11 K/UL — SIGNIFICANT CHANGE UP (ref 3.8–10.5)

## 2020-04-18 PROCEDURE — 93312 ECHO TRANSESOPHAGEAL: CPT

## 2020-04-18 PROCEDURE — 80048 BASIC METABOLIC PNL TOTAL CA: CPT

## 2020-04-18 PROCEDURE — 85730 THROMBOPLASTIN TIME PARTIAL: CPT

## 2020-04-18 PROCEDURE — 36600 WITHDRAWAL OF ARTERIAL BLOOD: CPT

## 2020-04-18 PROCEDURE — C9113: CPT

## 2020-04-18 PROCEDURE — 70496 CT ANGIOGRAPHY HEAD: CPT

## 2020-04-18 PROCEDURE — 71250 CT THORAX DX C-: CPT

## 2020-04-18 PROCEDURE — 94640 AIRWAY INHALATION TREATMENT: CPT

## 2020-04-18 PROCEDURE — 86850 RBC ANTIBODY SCREEN: CPT

## 2020-04-18 PROCEDURE — 94002 VENT MGMT INPAT INIT DAY: CPT

## 2020-04-18 PROCEDURE — 70450 CT HEAD/BRAIN W/O DYE: CPT

## 2020-04-18 PROCEDURE — 93325 DOPPLER ECHO COLOR FLOW MAPG: CPT

## 2020-04-18 PROCEDURE — 83735 ASSAY OF MAGNESIUM: CPT

## 2020-04-18 PROCEDURE — 36415 COLL VENOUS BLD VENIPUNCTURE: CPT

## 2020-04-18 PROCEDURE — 74018 RADEX ABDOMEN 1 VIEW: CPT

## 2020-04-18 PROCEDURE — 93306 TTE W/DOPPLER COMPLETE: CPT

## 2020-04-18 PROCEDURE — 80162 ASSAY OF DIGOXIN TOTAL: CPT

## 2020-04-18 PROCEDURE — 83605 ASSAY OF LACTIC ACID: CPT

## 2020-04-18 PROCEDURE — 93320 DOPPLER ECHO COMPLETE: CPT

## 2020-04-18 PROCEDURE — 84100 ASSAY OF PHOSPHORUS: CPT

## 2020-04-18 PROCEDURE — 81001 URINALYSIS AUTO W/SCOPE: CPT

## 2020-04-18 PROCEDURE — 87070 CULTURE OTHR SPECIMN AEROBIC: CPT

## 2020-04-18 PROCEDURE — 92526 ORAL FUNCTION THERAPY: CPT | Mod: GN

## 2020-04-18 PROCEDURE — 92523 SPEECH SOUND LANG COMPREHEN: CPT | Mod: GN

## 2020-04-18 PROCEDURE — 84146 ASSAY OF PROLACTIN: CPT

## 2020-04-18 PROCEDURE — 82962 GLUCOSE BLOOD TEST: CPT

## 2020-04-18 PROCEDURE — 92610 EVALUATE SWALLOWING FUNCTION: CPT | Mod: GN

## 2020-04-18 PROCEDURE — 83036 HEMOGLOBIN GLYCOSYLATED A1C: CPT

## 2020-04-18 PROCEDURE — 80202 ASSAY OF VANCOMYCIN: CPT

## 2020-04-18 PROCEDURE — 87186 SC STD MICRODIL/AGAR DIL: CPT

## 2020-04-18 PROCEDURE — 87040 BLOOD CULTURE FOR BACTERIA: CPT

## 2020-04-18 PROCEDURE — 86900 BLOOD TYPING SEROLOGIC ABO: CPT

## 2020-04-18 PROCEDURE — 97530 THERAPEUTIC ACTIVITIES: CPT | Mod: GP

## 2020-04-18 PROCEDURE — 86901 BLOOD TYPING SEROLOGIC RH(D): CPT

## 2020-04-18 PROCEDURE — 93308 TTE F-UP OR LMTD: CPT

## 2020-04-18 PROCEDURE — 95819 EEG AWAKE AND ASLEEP: CPT

## 2020-04-18 PROCEDURE — 87150 DNA/RNA AMPLIFIED PROBE: CPT

## 2020-04-18 PROCEDURE — 93970 EXTREMITY STUDY: CPT

## 2020-04-18 PROCEDURE — 71045 X-RAY EXAM CHEST 1 VIEW: CPT | Mod: 26

## 2020-04-18 PROCEDURE — 74178 CT ABD&PLV WO CNTR FLWD CNTR: CPT

## 2020-04-18 PROCEDURE — 87086 URINE CULTURE/COLONY COUNT: CPT

## 2020-04-18 PROCEDURE — 70498 CT ANGIOGRAPHY NECK: CPT

## 2020-04-18 PROCEDURE — 97163 PT EVAL HIGH COMPLEX 45 MIN: CPT | Mod: GP

## 2020-04-18 PROCEDURE — 71045 X-RAY EXAM CHEST 1 VIEW: CPT

## 2020-04-18 PROCEDURE — 83615 LACTATE (LD) (LDH) ENZYME: CPT

## 2020-04-18 PROCEDURE — U0003: CPT

## 2020-04-18 PROCEDURE — 82803 BLOOD GASES ANY COMBINATION: CPT

## 2020-04-18 PROCEDURE — 97116 GAIT TRAINING THERAPY: CPT | Mod: GP

## 2020-04-18 PROCEDURE — 85610 PROTHROMBIN TIME: CPT

## 2020-04-18 PROCEDURE — 87635 SARS-COV-2 COVID-19 AMP PRB: CPT

## 2020-04-18 PROCEDURE — 94003 VENT MGMT INPAT SUBQ DAY: CPT

## 2020-04-18 PROCEDURE — 86140 C-REACTIVE PROTEIN: CPT

## 2020-04-18 PROCEDURE — 82248 BILIRUBIN DIRECT: CPT

## 2020-04-18 PROCEDURE — 97110 THERAPEUTIC EXERCISES: CPT | Mod: GP

## 2020-04-18 PROCEDURE — 85025 COMPLETE CBC W/AUTO DIFF WBC: CPT

## 2020-04-18 PROCEDURE — 85027 COMPLETE CBC AUTOMATED: CPT

## 2020-04-18 PROCEDURE — L8699: CPT

## 2020-04-18 PROCEDURE — 82140 ASSAY OF AMMONIA: CPT

## 2020-04-18 PROCEDURE — 93010 ELECTROCARDIOGRAM REPORT: CPT

## 2020-04-18 PROCEDURE — 84484 ASSAY OF TROPONIN QUANT: CPT

## 2020-04-18 PROCEDURE — 82550 ASSAY OF CK (CPK): CPT

## 2020-04-18 PROCEDURE — 84145 PROCALCITONIN (PCT): CPT

## 2020-04-18 PROCEDURE — 94660 CPAP INITIATION&MGMT: CPT

## 2020-04-18 PROCEDURE — 92507 TX SP LANG VOICE COMM INDIV: CPT | Mod: GN

## 2020-04-18 PROCEDURE — 80053 COMPREHEN METABOLIC PANEL: CPT

## 2020-04-18 PROCEDURE — 76857 US EXAM PELVIC LIMITED: CPT

## 2020-04-18 PROCEDURE — 84478 ASSAY OF TRIGLYCERIDES: CPT

## 2020-04-18 RX ORDER — NITROGLYCERIN 6.5 MG
0.5 CAPSULE, EXTENDED RELEASE ORAL ONCE
Refills: 0 | Status: COMPLETED | OUTPATIENT
Start: 2020-04-18 | End: 2020-04-18

## 2020-04-18 RX ORDER — ALBUTEROL 90 UG/1
4 AEROSOL, METERED ORAL ONCE
Refills: 0 | Status: COMPLETED | OUTPATIENT
Start: 2020-04-18 | End: 2020-04-18

## 2020-04-18 RX ORDER — FUROSEMIDE 40 MG
40 TABLET ORAL ONCE
Refills: 0 | Status: COMPLETED | OUTPATIENT
Start: 2020-04-18 | End: 2020-04-18

## 2020-04-18 RX ORDER — MAGNESIUM SULFATE 500 MG/ML
1 VIAL (ML) INJECTION ONCE
Refills: 0 | Status: COMPLETED | OUTPATIENT
Start: 2020-04-18 | End: 2020-04-18

## 2020-04-18 RX ADMIN — Medication 125 MILLIGRAM(S): at 20:05

## 2020-04-18 RX ADMIN — Medication 40 MILLIGRAM(S): at 20:05

## 2020-04-18 RX ADMIN — Medication 0.5 INCH(S): at 20:05

## 2020-04-18 RX ADMIN — Medication 1 GRAM(S): at 21:30

## 2020-04-18 RX ADMIN — Medication 100 GRAM(S): at 20:05

## 2020-04-18 RX ADMIN — ALBUTEROL 4 PUFF(S): 90 AEROSOL, METERED ORAL at 20:05

## 2020-04-18 NOTE — ED PROVIDER NOTE - CARE PLAN
Principal Discharge DX:	Respiratory distress  Secondary Diagnosis:	Acute on chronic congestive heart failure, unspecified heart failure type  Secondary Diagnosis:	COPD exacerbation

## 2020-04-18 NOTE — ED PROVIDER NOTE - CLINICAL SUMMARY MEDICAL DECISION MAKING FREE TEXT BOX
Pt with hx COPD, CHF, has a Watchman procedure for Afib, noncompliant, +smoker, not using CPAP machine, c/o SOB since this am. Could be COPD exacerbation vs CHF, nonrebreather, labs, steroids, magnesium, Albuterol, Lasix, nitroglycerin, pt may need intubation, will give a trial and give ABG.

## 2020-04-18 NOTE — ED ADULT NURSE NOTE - NSIMPLEMENTINTERV_GEN_ALL_ED
Implemented All Fall with Harm Risk Interventions:  [place] to call system. Call bell, personal items and telephone within reach. Instruct patient to call for assistance. Room bathroom lighting operational. Non-slip footwear when patient is off stretcher. Physically safe environment: no spills, clutter or unnecessary equipment. Stretcher in lowest position, wheels locked, appropriate side rails in place. Provide visual cue, wrist band, yellow gown, etc. Monitor gait and stability. Monitor for mental status changes and reorient to person, place, and time. Review medications for side effects contributing to fall risk. Reinforce activity limits and safety measures with patient and family. Provide visual clues: red socks.

## 2020-04-18 NOTE — ED PROVIDER NOTE - CONSTITUTIONAL, MLM
normal... +obese white male groaning, speaking at times, pink in color, in moderate respiratory distress.

## 2020-04-18 NOTE — ED PROVIDER NOTE - OBJECTIVE STATEMENT
66 y/o male with a PMHx of COPD and CHF presents to ED c/o SOB since this AM. Denies fevers, cough, chest pain. States never experienced similar sx before. Pt reports he is supposed to be on home o2 but isn't and supposed to use CPAP machine at night and isn't.

## 2020-04-18 NOTE — ED ADULT NURSE NOTE - OBJECTIVE STATEMENT
Patient presents to ED complaining of SOB. Patient complaining of SOB and ALANIZ starting this morning. Patient tachypneic, tachycardic on arrival. COughing and audible wheezing on arrival. Per EMS, pt pt was in rapid afib in 180s upon EMS arrival with O2 sat in 80s. pt placed on NRB via EMS now with O2 sat of 97. pt given 125 solumedrol and 30 mg cardizem via EMS. PMHx of COPD, CHF

## 2020-04-18 NOTE — ED ADULT TRIAGE NOTE - CHIEF COMPLAINT QUOTE
pt presents to ED with complaints of SOB. per EMS, pt has hx of copd, afib, and chf. pt was in rapid afib in 180s upon EMS arrival with O2 sat in 80s. pt placed on NRB via EMS now with O2 sat of 97. pt given 125 solumedrol and 30 mg cardizem via EMS.

## 2020-04-18 NOTE — ED PROVIDER NOTE - MUSCULOSKELETAL, MLM
Spine appears normal, range of motion is not limited, no muscle or joint tenderness. +Extremities with trace edema. Spine appears normal, range of motion is not limited, no muscle or joint tenderness. +Left BKA.

## 2020-04-18 NOTE — ED PROVIDER NOTE - PROGRESS NOTE DETAILS
Pt much improved after meds. Urinated. Po 98 % on nasal O2. States is starving and wants to eat. Willa SMITH Pt has increased pCO2 on abg but clinically improve. Has hx chronic respiratory Co2 retention. Pt is supposed to use cpap at night but is noncompliant. Discussed with Dr. Durán. Pt is covid neg tonight. Has neg cxr except for big heart read by radiology . No sign of covid. Dr. Durán suggests putting pt on bipap for short while in ER to help decrease pCO2. Discussed with nursing and respiratory therapy and pt placed in neg pressure room. Pt informed of plan and is amenable. Discussed with Dr. Shea who is in agreement with plan. Willa SMITH

## 2020-04-19 LAB
ADD ON TEST-SPECIMEN IN LAB: SIGNIFICANT CHANGE UP
ANION GAP SERPL CALC-SCNC: 7 MMOL/L — SIGNIFICANT CHANGE UP (ref 5–17)
BASE EXCESS BLDA CALC-SCNC: 0.8 MMOL/L — SIGNIFICANT CHANGE UP (ref -2–2)
BASOPHILS # BLD AUTO: 0.01 K/UL — SIGNIFICANT CHANGE UP (ref 0–0.2)
BASOPHILS NFR BLD AUTO: 0.2 % — SIGNIFICANT CHANGE UP (ref 0–2)
BLOOD GAS COMMENTS ARTERIAL: SIGNIFICANT CHANGE UP
BUN SERPL-MCNC: 12 MG/DL — SIGNIFICANT CHANGE UP (ref 7–23)
CALCIUM SERPL-MCNC: 8.8 MG/DL — SIGNIFICANT CHANGE UP (ref 8.5–10.1)
CHLORIDE SERPL-SCNC: 98 MMOL/L — SIGNIFICANT CHANGE UP (ref 96–108)
CO2 SERPL-SCNC: 29 MMOL/L — SIGNIFICANT CHANGE UP (ref 22–31)
CREAT SERPL-MCNC: 0.99 MG/DL — SIGNIFICANT CHANGE UP (ref 0.5–1.3)
CRP SERPL-MCNC: 1.12 MG/DL — HIGH (ref 0–0.4)
EOSINOPHIL # BLD AUTO: 0 K/UL — SIGNIFICANT CHANGE UP (ref 0–0.5)
EOSINOPHIL NFR BLD AUTO: 0 % — SIGNIFICANT CHANGE UP (ref 0–6)
FERRITIN SERPL-MCNC: 151 NG/ML — SIGNIFICANT CHANGE UP (ref 30–400)
GAS PNL BLDA: SIGNIFICANT CHANGE UP
GLUCOSE SERPL-MCNC: 186 MG/DL — HIGH (ref 70–99)
HCO3 BLDA-SCNC: 30 MMOL/L — HIGH (ref 21–29)
HCT VFR BLD CALC: 45.3 % — SIGNIFICANT CHANGE UP (ref 39–50)
HGB BLD-MCNC: 14.7 G/DL — SIGNIFICANT CHANGE UP (ref 13–17)
IMM GRANULOCYTES NFR BLD AUTO: 0.4 % — SIGNIFICANT CHANGE UP (ref 0–1.5)
LACTATE SERPL-SCNC: 2.8 MMOL/L — HIGH (ref 0.7–2)
LYMPHOCYTES # BLD AUTO: 0.26 K/UL — LOW (ref 1–3.3)
LYMPHOCYTES # BLD AUTO: 5.5 % — LOW (ref 13–44)
MAGNESIUM SERPL-MCNC: 1.8 MG/DL — SIGNIFICANT CHANGE UP (ref 1.6–2.6)
MCHC RBC-ENTMCNC: 32 PG — SIGNIFICANT CHANGE UP (ref 27–34)
MCHC RBC-ENTMCNC: 32.5 GM/DL — SIGNIFICANT CHANGE UP (ref 32–36)
MCV RBC AUTO: 98.7 FL — SIGNIFICANT CHANGE UP (ref 80–100)
MONOCYTES # BLD AUTO: 0.07 K/UL — SIGNIFICANT CHANGE UP (ref 0–0.9)
MONOCYTES NFR BLD AUTO: 1.5 % — LOW (ref 2–14)
NEUTROPHILS # BLD AUTO: 4.35 K/UL — SIGNIFICANT CHANGE UP (ref 1.8–7.4)
NEUTROPHILS NFR BLD AUTO: 92.4 % — HIGH (ref 43–77)
PCO2 BLDA: 70 MMHG — CRITICAL HIGH (ref 32–46)
PH BLDA: 7.26 — LOW (ref 7.35–7.45)
PLATELET # BLD AUTO: 139 K/UL — LOW (ref 150–400)
PO2 BLDA: 89 MMHG — SIGNIFICANT CHANGE UP (ref 74–108)
POTASSIUM SERPL-MCNC: 3.7 MMOL/L — SIGNIFICANT CHANGE UP (ref 3.5–5.3)
POTASSIUM SERPL-SCNC: 3.7 MMOL/L — SIGNIFICANT CHANGE UP (ref 3.5–5.3)
RBC # BLD: 4.59 M/UL — SIGNIFICANT CHANGE UP (ref 4.2–5.8)
RBC # FLD: 13.2 % — SIGNIFICANT CHANGE UP (ref 10.3–14.5)
SAO2 % BLDA: 95 % — SIGNIFICANT CHANGE UP (ref 92–96)
SARS-COV-2 RNA SPEC QL NAA+PROBE: SIGNIFICANT CHANGE UP
SODIUM SERPL-SCNC: 134 MMOL/L — LOW (ref 135–145)
WBC # BLD: 4.71 K/UL — SIGNIFICANT CHANGE UP (ref 3.8–10.5)
WBC # FLD AUTO: 4.71 K/UL — SIGNIFICANT CHANGE UP (ref 3.8–10.5)

## 2020-04-19 PROCEDURE — 12345: CPT | Mod: NC

## 2020-04-19 PROCEDURE — 71250 CT THORAX DX C-: CPT | Mod: 26

## 2020-04-19 PROCEDURE — 99223 1ST HOSP IP/OBS HIGH 75: CPT

## 2020-04-19 PROCEDURE — 71045 X-RAY EXAM CHEST 1 VIEW: CPT | Mod: 26

## 2020-04-19 PROCEDURE — 93970 EXTREMITY STUDY: CPT | Mod: 26

## 2020-04-19 RX ORDER — ACETAZOLAMIDE 250 MG/1
125 TABLET ORAL DAILY
Refills: 0 | Status: DISCONTINUED | OUTPATIENT
Start: 2020-04-19 | End: 2020-04-24

## 2020-04-19 RX ORDER — TRAZODONE HCL 50 MG
100 TABLET ORAL AT BEDTIME
Refills: 0 | Status: DISCONTINUED | OUTPATIENT
Start: 2020-04-19 | End: 2020-04-28

## 2020-04-19 RX ORDER — ENOXAPARIN SODIUM 100 MG/ML
40 INJECTION SUBCUTANEOUS EVERY 12 HOURS
Refills: 0 | Status: DISCONTINUED | OUTPATIENT
Start: 2020-04-19 | End: 2020-04-28

## 2020-04-19 RX ORDER — DILTIAZEM HCL 120 MG
180 CAPSULE, EXT RELEASE 24 HR ORAL DAILY
Refills: 0 | Status: DISCONTINUED | OUTPATIENT
Start: 2020-04-19 | End: 2020-04-22

## 2020-04-19 RX ORDER — TAMSULOSIN HYDROCHLORIDE 0.4 MG/1
0.4 CAPSULE ORAL AT BEDTIME
Refills: 0 | Status: DISCONTINUED | OUTPATIENT
Start: 2020-04-19 | End: 2020-04-23

## 2020-04-19 RX ORDER — FUROSEMIDE 40 MG
40 TABLET ORAL
Refills: 0 | Status: DISCONTINUED | OUTPATIENT
Start: 2020-04-19 | End: 2020-04-21

## 2020-04-19 RX ORDER — ALBUTEROL 90 UG/1
2 AEROSOL, METERED ORAL EVERY 4 HOURS
Refills: 0 | Status: DISCONTINUED | OUTPATIENT
Start: 2020-04-19 | End: 2020-07-08

## 2020-04-19 RX ORDER — ACETAMINOPHEN 500 MG
650 TABLET ORAL EVERY 6 HOURS
Refills: 0 | Status: DISCONTINUED | OUTPATIENT
Start: 2020-04-19 | End: 2020-04-23

## 2020-04-19 RX ORDER — GABAPENTIN 400 MG/1
400 CAPSULE ORAL THREE TIMES A DAY
Refills: 0 | Status: DISCONTINUED | OUTPATIENT
Start: 2020-04-19 | End: 2020-06-22

## 2020-04-19 RX ORDER — ALBUTEROL 90 UG/1
2 AEROSOL, METERED ORAL EVERY 6 HOURS
Refills: 0 | Status: DISCONTINUED | OUTPATIENT
Start: 2020-04-19 | End: 2020-04-19

## 2020-04-19 RX ORDER — BUDESONIDE AND FORMOTEROL FUMARATE DIHYDRATE 160; 4.5 UG/1; UG/1
2 AEROSOL RESPIRATORY (INHALATION)
Refills: 0 | Status: DISCONTINUED | OUTPATIENT
Start: 2020-04-19 | End: 2020-07-08

## 2020-04-19 RX ORDER — ASPIRIN/CALCIUM CARB/MAGNESIUM 324 MG
81 TABLET ORAL DAILY
Refills: 0 | Status: DISCONTINUED | OUTPATIENT
Start: 2020-04-19 | End: 2020-04-29

## 2020-04-19 RX ORDER — PANTOPRAZOLE SODIUM 20 MG/1
40 TABLET, DELAYED RELEASE ORAL
Refills: 0 | Status: DISCONTINUED | OUTPATIENT
Start: 2020-04-19 | End: 2020-04-23

## 2020-04-19 RX ORDER — BENZOCAINE AND MENTHOL 5; 1 G/100ML; G/100ML
1 LIQUID ORAL ONCE
Refills: 0 | Status: COMPLETED | OUTPATIENT
Start: 2020-04-19 | End: 2020-04-19

## 2020-04-19 RX ORDER — FUROSEMIDE 40 MG
40 TABLET ORAL
Refills: 0 | Status: DISCONTINUED | OUTPATIENT
Start: 2020-04-19 | End: 2020-04-19

## 2020-04-19 RX ORDER — CLOPIDOGREL BISULFATE 75 MG/1
75 TABLET, FILM COATED ORAL DAILY
Refills: 0 | Status: DISCONTINUED | OUTPATIENT
Start: 2020-04-19 | End: 2020-05-12

## 2020-04-19 RX ORDER — DILTIAZEM HCL 120 MG
10 CAPSULE, EXT RELEASE 24 HR ORAL ONCE
Refills: 0 | Status: COMPLETED | OUTPATIENT
Start: 2020-04-19 | End: 2020-04-19

## 2020-04-19 RX ORDER — TIOTROPIUM BROMIDE 18 UG/1
1 CAPSULE ORAL; RESPIRATORY (INHALATION) DAILY
Refills: 0 | Status: DISCONTINUED | OUTPATIENT
Start: 2020-04-19 | End: 2020-07-08

## 2020-04-19 RX ADMIN — Medication 40 MILLIGRAM(S): at 21:48

## 2020-04-19 RX ADMIN — Medication 650 MILLIGRAM(S): at 18:47

## 2020-04-19 RX ADMIN — ENOXAPARIN SODIUM 40 MILLIGRAM(S): 100 INJECTION SUBCUTANEOUS at 21:48

## 2020-04-19 RX ADMIN — Medication 40 MILLIGRAM(S): at 06:19

## 2020-04-19 RX ADMIN — Medication 650 MILLIGRAM(S): at 21:52

## 2020-04-19 RX ADMIN — Medication 40 MILLIGRAM(S): at 06:20

## 2020-04-19 RX ADMIN — TIOTROPIUM BROMIDE 1 CAPSULE(S): 18 CAPSULE ORAL; RESPIRATORY (INHALATION) at 14:01

## 2020-04-19 RX ADMIN — Medication 0.5 INCH(S): at 07:56

## 2020-04-19 RX ADMIN — GABAPENTIN 400 MILLIGRAM(S): 400 CAPSULE ORAL at 06:20

## 2020-04-19 RX ADMIN — TAMSULOSIN HYDROCHLORIDE 0.4 MILLIGRAM(S): 0.4 CAPSULE ORAL at 21:49

## 2020-04-19 RX ADMIN — Medication 81 MILLIGRAM(S): at 11:25

## 2020-04-19 RX ADMIN — ALBUTEROL 2 PUFF(S): 90 AEROSOL, METERED ORAL at 23:50

## 2020-04-19 RX ADMIN — GABAPENTIN 400 MILLIGRAM(S): 400 CAPSULE ORAL at 14:01

## 2020-04-19 RX ADMIN — Medication 40 MILLIGRAM(S): at 17:43

## 2020-04-19 RX ADMIN — Medication 10 MILLIGRAM(S): at 03:53

## 2020-04-19 RX ADMIN — BUDESONIDE AND FORMOTEROL FUMARATE DIHYDRATE 2 PUFF(S): 160; 4.5 AEROSOL RESPIRATORY (INHALATION) at 21:48

## 2020-04-19 RX ADMIN — ACETAZOLAMIDE 125 MILLIGRAM(S): 250 TABLET ORAL at 11:25

## 2020-04-19 RX ADMIN — ENOXAPARIN SODIUM 40 MILLIGRAM(S): 100 INJECTION SUBCUTANEOUS at 06:20

## 2020-04-19 RX ADMIN — ALBUTEROL 2 PUFF(S): 90 AEROSOL, METERED ORAL at 21:48

## 2020-04-19 RX ADMIN — Medication 40 MILLIGRAM(S): at 14:01

## 2020-04-19 RX ADMIN — GABAPENTIN 400 MILLIGRAM(S): 400 CAPSULE ORAL at 21:48

## 2020-04-19 RX ADMIN — Medication 650 MILLIGRAM(S): at 06:20

## 2020-04-19 RX ADMIN — PANTOPRAZOLE SODIUM 40 MILLIGRAM(S): 20 TABLET, DELAYED RELEASE ORAL at 06:20

## 2020-04-19 RX ADMIN — CLOPIDOGREL BISULFATE 75 MILLIGRAM(S): 75 TABLET, FILM COATED ORAL at 14:00

## 2020-04-19 RX ADMIN — Medication 180 MILLIGRAM(S): at 06:20

## 2020-04-19 NOTE — CONSULT NOTE ADULT - SUBJECTIVE AND OBJECTIVE BOX
HPI:    65 y.o.m w chronic AFib w Watchman device, CHF, COPD, HTN, obesity admitted with c/o SOB, as per EMS, pt was in rapid afib in 180s upon EMS arrival with O2 sat in 80s. pt placed on NRB via EMS with O2 sat of 97.  pt given 125 solumedrol and 30 mg cardizem via EMS. denies fevers, cough, chest pain. States never experienced similar sx before. Pt reports he is supposed to be on home O2 but isn't and supposed to use CPAP machine at night and isn't. In ED /85  HR 99  RR 25  T 98.6  97% sat NRB 15 L given solumedrol 125 mg by ED as well as albuterol x 1. magnesium IV, CXR clear; COVID-19 not detected, ABG revealed hypercapnea x 2 & given a trial of BiPAP, pat seen for pulmonary, getting increasingly sob, waitng ct angio, & will transfer for to COVID floor for further screening.      PAST MEDICAL HX  Alcohol abuse    Alcohol withdrawal    Anemia    AFIB atrial fibrillation    CHF (congestive heart failure)    Chronic atrial fibrillation    Chronic CHF    Chronic obstructive pulmonary disease (COPD)    Cirrhosis    Collapsed lung    COPD without exacerbation    Emphysema of lung    Falls    GERD (gastroesophageal reflux disease)    HTN hypertension    Meningitis    Poor historian    Presence of Watchman left atrial appendage closure device.    PAST SURGICAL HX  Presence of Watchman left atrial appendage closure device    S/P BKA (below knee amputation) unilateral, left    Unilateral amputation of lower extremity below knee.      FAMILY HX  Family history unknown: Adopted      SOCIAL HX  lives alone  former smoker  alcohol use last 2-3 days ago (2020 03:19)      PAST MEDICAL & SURGICAL HISTORY:  Chronic CHF  COPD without exacerbation  Atrial fibrillation  Presence of Watchman left atrial appendage closure device  Hypertension  GERD (gastroesophageal reflux disease)  Chronic obstructive pulmonary disease (COPD)  Anemia  Falls  Meningitis  Collapsed lung  Alcohol withdrawal  Emphysema of lung  Cirrhosis  CHF (congestive heart failure)  Poor historian  Alcohol abuse  Chronic atrial fibrillation  Unilateral amputation of lower extremity below knee  Presence of Watchman left atrial appendage closure device  S/P BKA (below knee amputation) unilateral, left      Home Medications:  albuterol 2.5 mg/3 mL (0.083%) inhalation solution: 3 milliliter(s) inhaled every 6 hours, As Needed -for shortness of breath and/or wheezing (2020 03:12)  gabapentin 400 mg oral capsule: 1 cap(s) orally 3 times a day (2020 03:12)  pantoprazole 40 mg oral granule, enteric coated: 40 milligram(s) orally once a day (2020 03:12)  Spiriva 18 mcg inhalation capsule: 1 cap(s) inhaled once a day (2020 03:12)  tamsulosin 0.4 mg oral capsule: 1 cap(s) orally once a day (at bedtime) (2020 03:12)  traZODone 50 mg oral tablet: 2 tab(s) orally once a day (at bedtime), As Needed (2020 03:12)      MEDICATIONS  (STANDING):  acetaZOLAMIDE    Tablet 125 milliGRAM(s) Oral daily  aspirin enteric coated 81 milliGRAM(s) Oral daily  clopidogrel Tablet 75 milliGRAM(s) Oral daily  diltiazem    milliGRAM(s) Oral daily  enoxaparin Injectable 40 milliGRAM(s) SubCutaneous every 12 hours  furosemide    Tablet 40 milliGRAM(s) Oral two times a day  gabapentin 400 milliGRAM(s) Oral three times a day  methylPREDNISolone sodium succinate Injectable 40 milliGRAM(s) IV Push every 8 hours  pantoprazole    Tablet 40 milliGRAM(s) Oral before breakfast  tamsulosin 0.4 milliGRAM(s) Oral at bedtime  tiotropium 18 MICROgram(s) Capsule 1 Capsule(s) Inhalation daily    MEDICATIONS  (PRN):  acetaminophen   Tablet .. 650 milliGRAM(s) Oral every 6 hours PRN Temp greater or equal to 38C (100.4F), Mild Pain (1 - 3)  aluminum hydroxide/magnesium hydroxide/simethicone Suspension 30 milliLiter(s) Oral every 4 hours PRN Dyspepsia  traZODone 100 milliGRAM(s) Oral at bedtime PRN insomnia      Allergies    Ceclor (Rash)  Duricef (Rash)    Intolerances        SOCIAL HISTORY: Denies tobacco, etoh abuse or illicit drug use    FAMILY HISTORY:  No pertinent family history in first degree relatives  Family history unknown: Adopted      Vital Signs Last 24 Hrs  T(C): 36.2 (2020 11:02), Max: 37 (2020 19:32)  T(F): 97.2 (2020 11:02), Max: 98.6 (2020 19:32)  HR: 98 (2020 11:02) (98 - 133)  BP: 134/64 (2020 11:02) (123/68 - 161/85)  BP(mean): 94 (2020 21:45) (94 - 94)  RR: 26 (2020 11:02) (20 - 27)  SpO2: 96% (2020 11:02) (94% - 98%)        REVIEW OF SYSTEMS:    CONSTITUTIONAL:  As per HPI.  HEENT:  Eyes:  No diplopia or blurred vision. ENT:  No earache, sore throat or runny nose.  CARDIOVASCULAR:  No pressure, squeezing, tightness, heaviness or aching about the chest, neck, axilla or epigastrium.  RESPIRATORY:  No cough, _shortness of breath, PND or orthopnea.  GASTROINTESTINAL:  No nausea, vomiting or diarrhea.  GENITOURINARY:  No dysuria, frequency or urgency.  MUSCULOSKELETAL:  As per HPI.  SKIN:  No change in skin, hair or nails.  NEUROLOGIC:  No paresthesias, fasciculations, seizures or weakness.  PSYCHIATRIC:  No disorder of thought or mood.  ENDOCRINE:  No heat or cold intolerance, polyuria or polydipsia.  HEMATOLOGICAL:  No easy bruising or bleedings:  .     PHYSICAL EXAMINATION:    GENERAL APPEARANCE:  Pt. is not currently dyspneic, in no distress. Pt. is alert, oriented, and pleasant.  HEENT:  Pupils are normal and react normally. No icterus. Mucous membranes well colored.  NECK:  Supple. No lymphadenopathy. Jugular venous pressure not elevated. Carotids equal.   HEART:   The cardiac impulse has a normal quality. Regular. Normal S1 and S2. There are no murmurs, rubs or gallops noted  CHEST:  Chest poor air entry to auscultation. Normal respiratory effort.  ABDOMEN:  Soft and nontender.   EXTREMITIES:  There is no cyanosis, clubbing or edema.   SKIN:  No rash or significant lesions are noted.    LABS:                        14.7   4.71  )-----------( 139      ( 2020 08:19 )             45.3     -19    134<L>  |  98  |  12  ----------------------------<  186<H>  3.7   |  29  |  0.99    Ca    8.8      2020 08:19  Mg     1.8         TPro  8.0  /  Alb  3.7  /  TBili  0.5  /  DBili  x   /  AST  26  /  ALT  22  /  AlkPhos  162<H>      LIVER FUNCTIONS - ( 2020 19:43 )  Alb: 3.7 g/dL / Pro: 8.0 gm/dL / ALK PHOS: 162 U/L / ALT: 22 U/L / AST: 26 U/L / GGT: x           PT/INR - ( 2020 19:43 )   PT: 11.7 sec;   INR: 1.05 ratio         PTT - ( 2020 19:43 )  PTT:34.8 sec  CARDIAC MARKERS ( :43 )  <0.015 ng/mL / x     / 302 U/L / x     / x          Urinalysis Basic - ( 2020 19:43 )    Color: Yellow / Appearance: Clear / S.010 / pH: x  Gluc: x / Ketone: Negative  / Bili: Negative / Urobili: Negative mg/dL   Blood: x / Protein: Negative mg/dL / Nitrite: Negative   Leuk Esterase: Negative / RBC: >50 /HPF / WBC 0-2   Sq Epi: x / Non Sq Epi: Negative / Bacteria: Negative      ABG - ( 2020 23:48 )  pH, Arterial: 7.26  pH, Blood: x     /  pCO2: 70    /  pO2: 89    / HCO3: 30    / Base Excess: .8    /  SaO2: 95        RADIOLOGY & ADDITIONAL STUDIES:     Xray Chest 1 View-PORTABLE IMMEDIATE (20 @ 20:35) >  IMPRESSION:    Clear lungs.    Enlarged cardiac silhouette.

## 2020-04-19 NOTE — H&P ADULT - PSYCHIATRIC
PROVIDER:[TOKEN:[[id number]]] PROVIDER:[TOKEN:[[id number]]],PROVIDER:[TOKEN:[[id number]]] negative Affect and characteristics of appearance, verbalizations, behaviors are appropriate

## 2020-04-19 NOTE — H&P ADULT - ASSESSMENT
65 year old male w chronic AFib w Watchman device, CHF, COPD, HTN, obesity came to ED w EMS c/o SOB since the morning of 04-18    #Acute  on chronic hypercapnic + hypoxemic respiratory failure  #OHS/VIJAY not compliant  #Acute flare of COPD/chronic vs acute on chronic bronchitis  #COVID negative  1. admit to med  2. pulse ox q 4 hrs  3. s/p solumedrol 125 mg by EMS and then by ED  4. solumedrol 40 mg q 8 hrs  5. spiriva 18 mg   6. albuterol MDI  7. pulm consulted    #Chronic AF w Watchman device  #Tachycardic  1. diltiazem 10 mg IV x 1  2. diltiazem  mg daily    #CHF  R heart failure  1. lasix 40 mg BID  2. acetazolamide  3. daily weights  4. asa 81 mg  5. plavix 75 mg      #BPH  1. flomax 0.4 mg    #GERD  1. protonix 40 mg    #fall risk  L BKA has prosthesis w him  1. to consider PT evaluation when improved       IMPROVE VTE Individual Risk Assessment    RISK                                                                Points    [  ] Previous VTE                                                  3    [  ] Thrombophilia                                               2    [  ] Lower limb paralysis                                      2        (unable to hold up >15 seconds)      [  ] Current Cancer                                              2         (within 6 months)    [  ] Immobilization > 24 hrs                                1    [  ] ICU/CCU stay > 24 hours                              1    [ X ] Age > 60                                                      1    IMPROVE VTE Score _______1__    IMPROVE Score 0-1: Low Risk, No VTE prophylaxis required for most patients, encourage ambulation.   IMPROVE Score 2-3: At risk, pharmacologic VTE prophylaxis is indicated for most patients (in the absence of a contraindication)  IMPROVE Score > or = 4: High Risk, pharmacologic VTE prophylaxis is indicated for most patients (in the absence of a contraindication)

## 2020-04-19 NOTE — CONSULT NOTE ADULT - ASSESSMENT
PROBLEMS;    Ac on chronic hypercapnic & hypoxamic respiratory failure  OHS/VIJAY  AC flare of COPD/chronic vs acute on chronic bronchitis  Mild interstitial lung disease-NSIP h/o smoking  COVID negative  Pulmonary hypertension  Chronic afib w Watchman device  CHF  BPH  GERD    PLAN;    iv solumedrols 40mg q8hr  aerosols  ct chest pending  100% NRB-totrate  ABG  supportive care  covid isolation & repeat testing  d/w staff  dvt prophylasix

## 2020-04-19 NOTE — PROGRESS NOTE ADULT - ASSESSMENT
65 year old male w chronic AFib w Watchman device, CHF, COPD, HTN, obesity came to ED w EMS c/o SOB since the morning of 04-18    #Acute on chronic hypercapnic + hypoxemic respiratory failure  - Likely COPD exacerbation vs acute on chronic bronchitis  - COVID negative   - Pulse ox q 4 hrs  - Supplemental O2  - s/p solumedrol 125 mg by EMS and then by ED  - c/w solumedrol 40 mg q 8 hrs  - Spiriva 18 mg   - albuterol MDI  - Pulm consulted    #VIJAY  - Not compliant with CPAP/BiPAP  - Administer CPAP/BiPAP at bedtime    #Chronic AF w Watchman device  - Tachycardic to 110s  - s/p diltiazem 10 mg IV x 1  - c/w diltiazem  mg daily  - Monitor VS q8h    #CHF  - R heart failure; EF 60%  - Lasix 40 mg BID  - Acetazolamide  - Daily weights  - ASA 81 mg  - Plavix 75 mg    #BPH  - c/w Flomax 0.4 mg    #GERD  - c/w Protonix 40 mg    #Fall risk  - L BKA has prosthesis w him  - Consider PT evaluation when improved     #Preventive Measures  - VTE: Lovenox  - Diet: DASH Diet 65 year old male w chronic AFib w Watchman device, CHF, COPD, HTN, obesity came to ED w EMS c/o SOB since the morning of 04-18    #Acute on chronic hypercapnic + hypoxemic respiratory failure  - Likely COPD exacerbation vs acute on chronic bronchitis  - COVID negative   - Pulse ox q 4 hrs  - Supplemental O2  - s/p solumedrol 125 mg by EMS and then by ED  - c/w solumedrol 40 mg q 8 hrs  - Spiriva 18 mg   - albuterol MDI  - Pulm consulted    #VIJAY  - Not compliant with CPAP/BiPAP  - Administer CPAP/BiPAP at bedtime    #Chronic AF w Watchman device  - s/p diltiazem 10 mg IV x 1  - c/w diltiazem  mg daily  - Monitor VS q8h    #CHF  - R heart failure; EF 60%  - Lasix 40 mg BID  - Acetazolamide  - Daily weights  - ASA 81 mg  - Plavix 75 mg    #BPH  - c/w Flomax 0.4 mg    #GERD  - c/w Protonix 40 mg    #Fall risk  - L BKA has prosthesis w him  - Consider PT evaluation when improved     #Preventive Measures  - VTE: Lovenox  - Diet: DASH Diet 65 year old male w chronic AFib w Watchman device, CHF, COPD, HTN, obesity came to ED w EMS c/o SOB since the morning of 04-18    #Acute on chronic hypercapnic + hypoxemic respiratory failure  - Likely COPD exacerbation vs acute on chronic bronchitis  - Lymphopenia: 5.5 (4/19) < 11.6 (4/18)   - COVID negative. f/u repeat COVID PCR  - Repeat CXR  - CT Chest  - Pulse ox q 4 hrs  - Supplemental O2  - s/p solumedrol 125 mg by EMS and then by ED  - c/w solumedrol 40 mg q 8 hrs  - Spiriva 18 mg   - albuterol MDI  - Pulm consulted    #VIJAY  - Not compliant with CPAP/BiPAP  - Administer CPAP/BiPAP at bedtime    #Chronic AF w Watchman device  - s/p diltiazem 10 mg IV x 1  - c/w diltiazem  mg daily  - Monitor VS q8h    #CHF  - R heart failure; EF 60%  - Lasix 40 mg BID  - Acetazolamide  - Daily weights  - ASA 81 mg  - Plavix 75 mg    #BPH  - c/w Flomax 0.4 mg    #GERD  - c/w Protonix 40 mg    #Fall risk  - L BKA has prosthesis w him  - Consider PT evaluation when improved     #Preventive Measures  - VTE: Lovenox  - Diet: DASH Diet 65 year old male w chronic AFib w Watchman device, CHF, COPD, HTN, obesity came to ED w EMS c/o SOB since the morning of 04-18.  Found to have acute on chronic respiratory failure due to COPD exacerbation/ rapid AFIB/ CHF exacerbation.      #Acute on chronic hypercapnic + hypoxemic respiratory failure:    - Suspect uncontrolled afib/ CHF triggering COPD exacerabtion  - COVID negative x2.    - CT chest negative-- no PNA/ no COVID changes.    - DDimer <150  - S/p NRB and BIPAP on admission  - Supplemental O2 NC @ 6L    - c/w solumedrol 40 mg q 8 hrs  - Spiriva 18 mg   - Add advair BID  - albuterol MDI q4 hours  - Pulm consulted  - will need o2 determination prior to d/c.      #Acute on Chronic Systolic CHF exacerbation:    - suspect more right heart failure.    - previous ECHO noted.    - improved with BIPAP on admission  - start lasix 40 IV BID.    - cont home Acetazolamide to help with diuresis and contraction alkalosis  - Daily weights  - ASA 81 mg  - Plavix 75 mg  - cardio eval     #Persistent afib with uncontrolled rate on admission:    - 's on admission-- improved after IV diltiazem.    - Cont home diltiazem  mg daily  - transfer to tele to r/o uncontrolled afib contributing to hypoxia/ SOB/ CHF   - cardio eval    #VIJAY  - Not compliant with CPAP @ home    #BPH  - c/w Flomax 0.4 mg    #GERD  - c/w Protonix 40 mg    #Preventive Measures  - VTE: Lovenox

## 2020-04-19 NOTE — H&P ADULT - HISTORY OF PRESENT ILLNESS
65 year old male w chronic AFib w Watchman device, CHF, COPD, HTN, obesity came to ED w EMS c/o SOB since the morning of 04-18    per EMS, pt was in rapid afib in 180s upon EMS arrival with O2 sat in 80s. pt placed on NRB via EMS now with O2 sat of 97.   pt given 125 solumedrol and 30 mg cardizem via EMS.  denies fevers, cough, chest pain. States never experienced similar sx before. Pt reports he is supposed to be on home O2 but isn't and supposed to use CPAP machine at night and isn't.    In ED /85  HR 99  RR 25  T 98.6  97% sat NRB 15 L  given solumedrol 125 mg by ED as well as albuterol x 1. magnesium IV  CXR clear; COVID-19 not detected  ABG revealed hypercapnea x 2; trial of BiPAP        PAST MEDICAL HX  Alcohol abuse    Alcohol withdrawal    Anemia    AFIB atrial fibrillation    CHF (congestive heart failure)    Chronic atrial fibrillation    Chronic CHF    Chronic obstructive pulmonary disease (COPD)    Cirrhosis    Collapsed lung    COPD without exacerbation    Emphysema of lung    Falls    GERD (gastroesophageal reflux disease)    HTN hypertension    Meningitis    Poor historian    Presence of Watchman left atrial appendage closure device.    PAST SURGICAL HX  Presence of Watchman left atrial appendage closure device    S/P BKA (below knee amputation) unilateral, left    Unilateral amputation of lower extremity below knee.      FAMILY HX  Family history unknown: Adopted      SOCIAL HX  lives alone  former smoker  alcohol use last 2-3 days ago

## 2020-04-19 NOTE — H&P ADULT - NSHPPHYSICALEXAM_GEN_ALL_CORE
Vital Signs Last 24 Hrs  T(C): 36.7 (18 Apr 2020 21:45), Max: 37 (18 Apr 2020 19:32)  T(F): 98.1 (18 Apr 2020 21:45), Max: 98.6 (18 Apr 2020 19:32)  HR: 105 (19 Apr 2020 02:51) (99 - 133)  BP: 123/68 (19 Apr 2020 02:51) (123/68 - 161/85)  BP(mean): 94 (18 Apr 2020 21:45) (94 - 94)  RR: 25 (19 Apr 2020 02:51) (20 - 25)  SpO2: 94% (19 Apr 2020 02:51) (94% - 97%)

## 2020-04-19 NOTE — H&P ADULT - RS GEN PE MLT RESP DETAILS PC
expiratory/no intercostal retractions/diminished breath sounds, R/wheezes/diminished breath sounds, L

## 2020-04-19 NOTE — PROGRESS NOTE ADULT - SUBJECTIVE AND OBJECTIVE BOX
Subjective:  Patient is a 65y old  Male who presents with a chief complaint of acute hypoxemic, hypercapneic resp failure, COPD exacerbation (19 Apr 2020 03:19)    HPI:  65 year old male w chronic AFib w Watchman device, CHF, COPD, HTN, obesity came to ED w EMS c/o SOB since the morning of 04-18    Per EMS, pt was in rapid afib in 180s upon EMS arrival with O2 sat in 80s. pt placed on NRB via EMS now with O2 sat of 97.   pt given 125 solumedrol and 30 mg cardizem via EMS.  denies fevers, cough, chest pain. States never experienced similar sx before. Pt reports he is supposed to be on home O2 but isn't and supposed to use CPAP machine at night and isn't.    In ED /85  HR 99  RR 25  T 98.6  97% sat NRB 15 L  given solumedrol 125 mg by ED as well as albuterol x 1. magnesium IV  CXR clear; COVID-19 not detected  ABG revealed hypercapnea x 2; trial of BiPAP    PAST MEDICAL HX  Alcohol abuse    Alcohol withdrawal    Anemia    AFIB atrial fibrillation    CHF (congestive heart failure)    Chronic atrial fibrillation    Chronic CHF    Chronic obstructive pulmonary disease (COPD)    Cirrhosis    Collapsed lung    COPD without exacerbation    Emphysema of lung    Falls    GERD (gastroesophageal reflux disease)    HTN hypertension    Meningitis    Poor historian    Presence of Watchman left atrial appendage closure device.    PAST SURGICAL HX  Presence of Watchman left atrial appendage closure device    S/P BKA (below knee amputation) unilateral, left    Unilateral amputation of lower extremity below knee.    FAMILY HX  Family history unknown: Adopted    SOCIAL HX  lives alone  former smoker  alcohol use last 2-3 days ago (19 Apr 2020 03:19)    Patient seen and examined at bedside earlier today,     Review of system- Rest of the review of system are negative except mentioned in HPI    OBJECTIVE:   T(C): 36.9 (04-19-20 @ 05:11), Max: 37 (04-18-20 @ 19:32)  HR: 112 (04-19-20 @ 05:11) (99 - 133)  BP: 145/78 (04-19-20 @ 05:11) (123/68 - 161/85)  RR: 27 (04-19-20 @ 05:11) (20 - 27)  SpO2: 98% on 6L NC (04-19-20 @ 05:11) (94% - 98%)    PHYSICAL EXAM:  GENERAL: NAD  NERVOUS SYSTEM:  Alert & Oriented X3, non- focal exam, Motor Strength 5/5 B/L upper and lower extremities; DTRs 2+ intact and symmetric  HEAD:  Atraumatic, Normocephalic  EYES: EOMI, PERRLA, conjunctiva and sclera clear  HEENT: Moist mucous membranes  NECK: Supple, No JVD  CHEST/LUNG: Clear to auscultation bilaterally; No rales, no rhonchi, no wheezing, or rubs  HEART: Regular rate and rhythm; No murmurs, rubs, or gallops  ABDOMEN: Soft, Nontender, Nondistended; Bowel sounds present  GENITOURINARY- Voiding, no suprapubic tenderness  EXTREMITIES:  2+ Peripheral Pulses, No clubbing, cyanosis, or edema  MUSCULOSKELETAL:- No muscle tenderness, Muscle tone normal, No joint tenderness, no Joint swelling, Joint range of motion-normal  SKIN-no rash, no lesion    LABS: 4/19 Labs Pending    RADIOLOGY & ADDITIONAL TESTS:  < from: Xray Chest 1 View-PORTABLE IMMEDIATE (04.18.20 @ 20:35) >  COMPARISON:  11/18/2019.    FINDINGS:  The lungs are clear without focal or diffuse airspace opacity. The hemidiaphragms are sharp; no evidence of a pleural effusion. Cardiac silhouette is enlarged despite AP projection. Mediastinal contours are within normal limits. There are degenerative changes of the spine.    IMPRESSION: Clear lungs. Enlarged cardiac silhouette.  < end of copied text >    MEDICATIONS  (STANDING):  acetaZOLAMIDE    Tablet 125 milliGRAM(s) Oral daily  aspirin enteric coated 81 milliGRAM(s) Oral daily  clopidogrel Tablet 75 milliGRAM(s) Oral daily  diltiazem    milliGRAM(s) Oral daily  enoxaparin Injectable 40 milliGRAM(s) SubCutaneous every 12 hours  furosemide    Tablet 40 milliGRAM(s) Oral two times a day  gabapentin 400 milliGRAM(s) Oral three times a day  methylPREDNISolone sodium succinate Injectable 40 milliGRAM(s) IV Push every 8 hours  pantoprazole    Tablet 40 milliGRAM(s) Oral before breakfast  tamsulosin 0.4 milliGRAM(s) Oral at bedtime  tiotropium 18 MICROgram(s) Capsule 1 Capsule(s) Inhalation daily    MEDICATIONS  (PRN):  acetaminophen   Tablet .. 650 milliGRAM(s) Oral every 6 hours PRN Temp greater or equal to 38C (100.4F), Mild Pain (1 - 3)  aluminum hydroxide/magnesium hydroxide/simethicone Suspension 30 milliLiter(s) Oral every 4 hours PRN Dyspepsia  traZODone 100 milliGRAM(s) Oral at bedtime PRN insomnia Subjective:  Patient is a 65y old  Male who presents with a chief complaint of acute hypoxemic, hypercapneic resp failure, COPD exacerbation (19 Apr 2020 03:19)    HPI:  65 year old male w chronic AFib w Watchman device, CHF, COPD, HTN, obesity came to ED w EMS c/o SOB since the morning of 04-18    Per EMS, pt was in rapid afib in 180s upon EMS arrival with O2 sat in 80s. pt placed on NRB via EMS now with O2 sat of 97.   pt given 125 solumedrol and 30 mg cardizem via EMS.  denies fevers, cough, chest pain. States never experienced similar sx before. Pt reports he is supposed to be on home O2 but isn't and supposed to use CPAP machine at night and isn't.    In ED /85  HR 99  RR 25  T 98.6  97% sat NRB 15 L  given solumedrol 125 mg by ED as well as albuterol x 1. magnesium IV  CXR clear; COVID-19 not detected  ABG revealed hypercapnea x 2; trial of BiPAP    PAST MEDICAL HX  Alcohol abuse    Alcohol withdrawal    Anemia    AFIB atrial fibrillation    CHF (congestive heart failure)    Chronic atrial fibrillation    Chronic CHF    Chronic obstructive pulmonary disease (COPD)    Cirrhosis    Collapsed lung    COPD without exacerbation    Emphysema of lung    Falls    GERD (gastroesophageal reflux disease)    HTN hypertension    Meningitis    Poor historian    Presence of Watchman left atrial appendage closure device.    PAST SURGICAL HX  Presence of Watchman left atrial appendage closure device    S/P BKA (below knee amputation) unilateral, left    Unilateral amputation of lower extremity below knee.    FAMILY HX  Family history unknown: Adopted    SOCIAL HX  lives alone  former smoker  alcohol use last 2-3 days ago (19 Apr 2020 03:19)    Patient seen and examined at bedside earlier today,     Review of system- Rest of the review of system are negative except mentioned in HPI    OBJECTIVE:   T(C): 36.9 (04-19-20 @ 05:11), Max: 37 (04-18-20 @ 19:32)  HR: 112 (04-19-20 @ 05:11) (99 - 133)  BP: 145/78 (04-19-20 @ 05:11) (123/68 - 161/85)  RR: 27 (04-19-20 @ 05:11) (20 - 27)  SpO2: 98% on 6L NC (04-19-20 @ 05:11) (94% - 98%)    PHYSICAL EXAM:  GENERAL: NAD  NERVOUS SYSTEM:  Alert & Oriented X3, non- focal exam, Motor Strength 5/5 B/L upper and lower extremities; DTRs 2+ intact and symmetric  HEAD:  Atraumatic, Normocephalic  EYES: EOMI, PERRLA, conjunctiva and sclera clear  HEENT: Moist mucous membranes  NECK: Supple, No JVD  CHEST/LUNG: Clear to auscultation bilaterally; No rales, no rhonchi, no wheezing, or rubs  HEART: Regular rate and rhythm; No murmurs, rubs, or gallops  ABDOMEN: Soft, Nontender, Nondistended; Bowel sounds present  GENITOURINARY- Voiding, no suprapubic tenderness  EXTREMITIES:  2+ Peripheral Pulses, No clubbing, cyanosis, or edema  MUSCULOSKELETAL:- No muscle tenderness, Muscle tone normal, No joint tenderness, no Joint swelling, Joint range of motion-normal  SKIN-no rash, no lesion    LABS: 4/19 Labs Pending    RADIOLOGY & ADDITIONAL TESTS:  < from: Xray Chest 1 View-PORTABLE IMMEDIATE (04.18.20 @ 20:35) >  COMPARISON:  11/18/2019.    FINDINGS:  The lungs are clear without focal or diffuse airspace opacity. The hemidiaphragms are sharp; no evidence of a pleural effusion. Cardiac silhouette is enlarged despite AP projection. Mediastinal contours are within normal limits. There are degenerative changes of the spine.    IMPRESSION: Clear lungs. Enlarged cardiac silhouette.  < end of copied text >    Current Medications:   acetaminophen   Tablet .. 650 milliGRAM(s) Oral every 6 hours PRN  acetaZOLAMIDE    Tablet 125 milliGRAM(s) Oral daily  aluminum hydroxide/magnesium hydroxide/simethicone Suspension 30 milliLiter(s) Oral every 4 hours PRN  aspirin enteric coated 81 milliGRAM(s) Oral daily  clopidogrel Tablet 75 milliGRAM(s) Oral daily  diltiazem    milliGRAM(s) Oral daily  enoxaparin Injectable 40 milliGRAM(s) SubCutaneous every 12 hours  furosemide    Tablet 40 milliGRAM(s) Oral two times a day  gabapentin 400 milliGRAM(s) Oral three times a day  methylPREDNISolone sodium succinate Injectable 40 milliGRAM(s) IV Push every 8 hours  pantoprazole    Tablet 40 milliGRAM(s) Oral before breakfast  tamsulosin 0.4 milliGRAM(s) Oral at bedtime  tiotropium 18 MICROgram(s) Capsule 1 Capsule(s) Inhalation daily  traZODone 100 milliGRAM(s) Oral at bedtime PRN Subjective:  Patient is a 65y old  Male who presents with a chief complaint of acute hypoxemic, hypercapneic resp failure COPD exacerbation (2020 07:41)    HPI:  65 year old male w chronic AFib w Watchman device, CHF, COPD, HTN, obesity came to ED w EMS c/o SOB since the morning of     Per EMS, pt was in rapid afib in 180s upon EMS arrival with O2 sat in 80s. pt placed on NRB via EMS now with O2 sat of 97.   Pt given 125 solumedrol and 30 mg cardizem via EMS.  Denies fevers, cough, chest pain. States never experienced similar sx before. Pt reports he is supposed to be on home O2 but isn't and supposed to use CPAP machine at night and isn't.    In ED /85  HR 99  RR 25  T 98.6  97% sat NRB 15 L  Given solumedrol 125 mg by ED as well as albuterol x 1. magnesium IV  CXR clear; COVID-19 not detected  ABG revealed hypercapnea x 2; trial of BiPAP    Patient seen and examined at bedside earlier today,   :     Review of system- Rest of the review of system are negative except mentioned in HPI    OBJECTIVE:   Vital Signs Last 24 Hrs  T(C): 36.2 (2020 11:02), Max: 37 (2020 19:32)  T(F): 97.2 (2020 11:02), Max: 98.6 (2020 19:32)  HR: 98 (2020 11:02) (98 - 133)  BP: 134/64 (2020 11:02) (123/68 - 161/85)  BP(mean): 94 (2020 21:45) (94 - 94)  RR: 26 (2020 11:02) (20 - 27)  SpO2: 96% on 6L NC (2020 11:02) (94% - 98%)    PHYSICAL EXAM:  GENERAL: NAD  NERVOUS SYSTEM:  Alert & Oriented X3, non- focal exam, Motor Strength 5/5 B/L upper and lower extremities; DTRs 2+ intact and symmetric  HEAD:  Atraumatic, Normocephalic  EYES: EOMI, PERRLA, conjunctiva and sclera clear  HEENT: Moist mucous membranes  NECK: Supple, No JVD  CHEST/LUNG: Clear to auscultation bilaterally; No rales, no rhonchi, no wheezing, or rubs  HEART: Regular rate and rhythm; No murmurs, rubs, or gallops  ABDOMEN: Soft, Nontender, Nondistended; Bowel sounds present  GENITOURINARY- Voiding, no suprapubic tenderness  EXTREMITIES:  2+ Peripheral Pulses, No clubbing, cyanosis, or edema  MUSCULOSKELETAL:- No muscle tenderness, Muscle tone normal, No joint tenderness, no Joint swelling, Joint range of motion-normal  SKIN-no rash, no lesion    LABS:                        14.7   4.71  )-----------( 139      ( 2020 08:19 )             45.3         134<L>  |  98  |  12  ----------------------------<  186<H>  3.7   |  29  |  0.99    Ca    8.8      2020 08:19  Mg     1.8         TPro  8.0  /  Alb  3.7  /  TBili  0.5  /  DBili  x   /  AST  26  /  ALT  22  /  AlkPhos  162<H>  18    PT/INR - ( 2020 19:43 )   PT: 11.7 sec;   INR: 1.05 ratio    PTT - ( 2020 19:43 )  PTT:34.8 sec    Lactate: 1.7 () < 2.8 ()    CARDIAC MARKERS ( 2020 19:43 )  <0.015 ng/mL / x     / 302 U/L / x     / x        Urinalysis Basic - ( 2020 19:43 )  Color: Yellow / Appearance: Clear / S.010 / pH: x  Gluc: x / Ketone: Negative  / Bili: Negative / Urobili: Negative mg/dL   Blood: x / Protein: Negative mg/dL / Nitrite: Negative   Leuk Esterase: Negative / RBC: >50 /HPF / WBC 0-2   Sq Epi: x / Non Sq Epi: Negative / Bacteria: Negative    ABG - ( 2020 23:48 )  pH, Arterial: 7.26  pH, Blood: x     /  pCO2: 70    /  pO2: 89    / HCO3: 30    / Base Excess: .8    /  SaO2: 95        CURRENT MEDICATIONS  acetaminophen   Tablet .. 650 milliGRAM(s) Oral every 6 hours PRN  acetaZOLAMIDE    Tablet 125 milliGRAM(s) Oral daily  aluminum hydroxide/magnesium hydroxide/simethicone Suspension 30 milliLiter(s) Oral every 4 hours PRN  aspirin enteric coated 81 milliGRAM(s) Oral daily  clopidogrel Tablet 75 milliGRAM(s) Oral daily  diltiazem    milliGRAM(s) Oral daily  enoxaparin Injectable 40 milliGRAM(s) SubCutaneous every 12 hours  furosemide    Tablet 40 milliGRAM(s) Oral two times a day  gabapentin 400 milliGRAM(s) Oral three times a day  methylPREDNISolone sodium succinate Injectable 40 milliGRAM(s) IV Push every 8 hours  pantoprazole    Tablet 40 milliGRAM(s) Oral before breakfast  tamsulosin 0.4 milliGRAM(s) Oral at bedtime  tiotropium 18 MICROgram(s) Capsule 1 Capsule(s) Inhalation daily  traZODone 100 milliGRAM(s) Oral at bedtime PRN Subjective:  Patient is a 65y old  Male who presents with a chief complaint of acute hypoxemic, hypercapneic resp failure COPD exacerbation (2020 07:41)    HPI:  65 year old male w chronic AFib w Watchman device, CHF, COPD, HTN, obesity came to ED w EMS c/o SOB since the morning of     Per EMS, pt was in rapid afib in 180s upon EMS arrival with O2 sat in 80s. pt placed on NRB via EMS now with O2 sat of 97.   Pt given 125 solumedrol and 30 mg cardizem via EMS.  Denies fevers, cough, chest pain. States never experienced similar sx before. Pt reports he is supposed to be on home O2 but isn't and supposed to use CPAP machine at night and isn't.    In ED /85  HR 99  RR 25  T 98.6  97% sat NRB 15 L  Given solumedrol 125 mg by ED as well as albuterol x 1. magnesium IV  CXR clear; COVID-19 not detected  ABG revealed hypercapnea x 2; trial of BiPAP    Patient seen and examined at bedside earlier today,   :     Review of system- Rest of the review of system are negative except mentioned in HPI    OBJECTIVE:   Vital Signs Last 24 Hrs  T(C): 36.2 (2020 11:02), Max: 37 (2020 19:32)  T(F): 97.2 (2020 11:02), Max: 98.6 (2020 19:32)  HR: 98 (2020 11:02) (98 - 133)  BP: 134/64 (2020 11:02) (123/68 - 161/85)  BP(mean): 94 (2020 21:45) (94 - 94)  RR: 26 (2020 11:02) (20 - 27)  SpO2: 96% on 6L NC (2020 11:02) (94% - 98%)    PHYSICAL EXAM:  GENERAL: NAD  NERVOUS SYSTEM:  Alert & Oriented X3, non- focal exam, Motor Strength 5/5 B/L upper and lower extremities; DTRs 2+ intact and symmetric  HEAD:  Atraumatic, Normocephalic  EYES: EOMI, PERRLA, conjunctiva and sclera clear  HEENT: Moist mucous membranes  NECK: Supple, No JVD  CHEST/LUNG: Clear to auscultation bilaterally; No rales, no rhonchi, no wheezing, or rubs  HEART: Regular rate and rhythm; No murmurs, rubs, or gallops  ABDOMEN: Soft, Nontender, Nondistended; Bowel sounds present  GENITOURINARY- Voiding, no suprapubic tenderness  EXTREMITIES:  2+ Peripheral Pulses, No clubbing, cyanosis, or edema  MUSCULOSKELETAL:- No muscle tenderness, Muscle tone normal, No joint tenderness, no Joint swelling, Joint range of motion-normal  SKIN-no rash, no lesion    LABS:                        14.7   4.71  )-----------( 139      ( 2020 08:19 )             45.3     Auto Lymph % 5.5 () < 11.6 ()        134<L>  |  98  |  12  ----------------------------<  186<H>  3.7   |  29  |  0.99    Ca    8.8      2020 08:19  Mg     1.8         TPro  8.0  /  Alb  3.7  /  TBili  0.5  /  DBili  x   /  AST  26  /  ALT  22  /  AlkPhos  162<H>      PT/INR - ( 2020 19:43 )   PT: 11.7 sec;   INR: 1.05 ratio    PTT - ( 2020 19:43 )  PTT:34.8 sec    Lactate: 1.7 () < 2.8 ()    CARDIAC MARKERS ( 2020 19:43 )  <0.015 ng/mL / x     / 302 U/L / x     / x        Urinalysis Basic - ( 2020 19:43 )  Color: Yellow / Appearance: Clear / S.010 / pH: x  Gluc: x / Ketone: Negative  / Bili: Negative / Urobili: Negative mg/dL   Blood: x / Protein: Negative mg/dL / Nitrite: Negative   Leuk Esterase: Negative / RBC: >50 /HPF / WBC 0-2   Sq Epi: x / Non Sq Epi: Negative / Bacteria: Negative    ABG - ( 2020 23:48 )  pH, Arterial: 7.26  pH, Blood: x     /  pCO2: 70    /  pO2: 89    / HCO3: 30    / Base Excess: .8    /  SaO2: 95        CURRENT MEDICATIONS  acetaminophen   Tablet .. 650 milliGRAM(s) Oral every 6 hours PRN  acetaZOLAMIDE    Tablet 125 milliGRAM(s) Oral daily  aluminum hydroxide/magnesium hydroxide/simethicone Suspension 30 milliLiter(s) Oral every 4 hours PRN  aspirin enteric coated 81 milliGRAM(s) Oral daily  clopidogrel Tablet 75 milliGRAM(s) Oral daily  diltiazem    milliGRAM(s) Oral daily  enoxaparin Injectable 40 milliGRAM(s) SubCutaneous every 12 hours  furosemide    Tablet 40 milliGRAM(s) Oral two times a day  gabapentin 400 milliGRAM(s) Oral three times a day  methylPREDNISolone sodium succinate Injectable 40 milliGRAM(s) IV Push every 8 hours  pantoprazole    Tablet 40 milliGRAM(s) Oral before breakfast  tamsulosin 0.4 milliGRAM(s) Oral at bedtime  tiotropium 18 MICROgram(s) Capsule 1 Capsule(s) Inhalation daily  traZODone 100 milliGRAM(s) Oral at bedtime PRN Subjective:  Patient is a 65y old  Male who presents with a chief complaint of acute hypoxemic, hypercapneic resp failure COPD exacerbation (2020 07:41)    HPI:  65 year old male w chronic AFib w Watchman device, CHF, COPD, HTN, VIJAY, obesity came to ED w EMS c/o acute onset of SOB since the morning of .  Per EMS, pt was in rapid afib in 180s upon EMS arrival with O2 sat in 80s. pt placed on NRB via EMS now with O2 sat of 97.  Pt given 125 solumedrol and 30 mg cardizem via EMS.  Denies fevers, cough, chest pain. States never experienced similar sx before.  In ED /85  HR 99  RR 25  T 98.6  97% sat NRB 15 L.  Given solumedrol 125 mg by ED as well as albuterol x 1. magnesium IV.  CXR clear; COVID-19 not detected.  ABG revealed hypercapnea x 2; trial of BiPAP.      :  Pt seen.  Feeling much better than yesterday.  S/p NRB in ambulance/ ER and then BIPAP with improvement in sx.      Review of system- Rest of the review of system are negative except mentioned in HPI.      OBJECTIVE:   Vital Signs Last 24 Hrs  T(C): 36.2 (2020 11:02), Max: 37 (2020 19:32)  T(F): 97.2 (2020 11:02), Max: 98.6 (2020 19:32)  HR: 98 (2020 11:02) (98 - 133)  BP: 134/64 (2020 11:02) (123/68 - 161/85)  BP(mean): 94 (2020 21:45) (94 - 94)  RR: 26 (2020 11:02) (20 - 27)  SpO2: 96% on 6L NC (2020 11:02) (94% - 98%)    PHYSICAL EXAM:  GENERAL: NAD  NERVOUS SYSTEM:  Alert & Oriented X3, non- focal exam, Motor Strength 5/5 B/L upper and lower extremities; DTRs 2+ intact and symmetric  HEAD:  Atraumatic, Normocephalic  EYES: EOMI, PERRLA, conjunctiva and sclera clear  HEENT: Moist mucous membranes  NECK: Supple, No JVD  CHEST/LUNG: prolonged expiration  HEART: irregularly irregular  ABDOMEN: Soft, Nontender, Nondistended; Bowel sounds present  GENITOURINARY- Voiding, no suprapubic tenderness  EXTREMITIES:  AKA on left.  Edema 2+ on right.    MUSCULOSKELETAL:- No muscle tenderness, Muscle tone normal, No joint tenderness, no Joint swelling, Joint range of motion-normal  SKIN-no rash, no lesion    LABS:                        14.7   4.71  )-----------( 139      ( 2020 08:19 )             45.3     Auto Lymph % 5.5 () < 11.6 ()        134<L>  |  98  |  12  ----------------------------<  186<H>  3.7   |  29  |  0.99    Ca    8.8      2020 08:19  Mg     1.8         TPro  8.0  /  Alb  3.7  /  TBili  0.5  /  DBili  x   /  AST  26  /  ALT  22  /  AlkPhos  162<H>  18    PT/INR - ( 2020 19:43 )   PT: 11.7 sec;   INR: 1.05 ratio    PTT - ( 2020 19:43 )  PTT:34.8 sec    Lactate: 1.7 () < 2.8 ()    CARDIAC MARKERS ( 2020 19:43 )  <0.015 ng/mL / x     / 302 U/L / x     / x        Urinalysis Basic - ( 2020 19:43 )  Color: Yellow / Appearance: Clear / S.010 / pH: x  Gluc: x / Ketone: Negative  / Bili: Negative / Urobili: Negative mg/dL   Blood: x / Protein: Negative mg/dL / Nitrite: Negative   Leuk Esterase: Negative / RBC: >50 /HPF / WBC 0-2   Sq Epi: x / Non Sq Epi: Negative / Bacteria: Negative    ABG - ( 2020 23:48 )  pH, Arterial: 7.26  pH, Blood: x     /  pCO2: 70    /  pO2: 89    / HCO3: 30    / Base Excess: .8    /  SaO2: 95        MEDICATIONS  (STANDING):  acetaZOLAMIDE    Tablet 125 milliGRAM(s) Oral daily  ALBUTerol    90 MICROgram(s) HFA Inhaler 2 Puff(s) Inhalation every 4 hours  aspirin enteric coated 81 milliGRAM(s) Oral daily  budesonide 160 MICROgram(s)/formoterol 4.5 MICROgram(s) Inhaler 2 Puff(s) Inhalation two times a day  clopidogrel Tablet 75 milliGRAM(s) Oral daily  diltiazem    milliGRAM(s) Oral daily  enoxaparin Injectable 40 milliGRAM(s) SubCutaneous every 12 hours  furosemide   Injectable 40 milliGRAM(s) IV Push two times a day  gabapentin 400 milliGRAM(s) Oral three times a day  methylPREDNISolone sodium succinate Injectable 40 milliGRAM(s) IV Push every 8 hours  pantoprazole    Tablet 40 milliGRAM(s) Oral before breakfast  tamsulosin 0.4 milliGRAM(s) Oral at bedtime  tiotropium 18 MICROgram(s) Capsule 1 Capsule(s) Inhalation daily    MEDICATIONS  (PRN):  acetaminophen   Tablet .. 650 milliGRAM(s) Oral every 6 hours PRN Temp greater or equal to 38C (100.4F), Mild Pain (1 - 3)  aluminum hydroxide/magnesium hydroxide/simethicone Suspension 30 milliLiter(s) Oral every 4 hours PRN Dyspepsia  traZODone 100 milliGRAM(s) Oral at bedtime PRN insomnia

## 2020-04-20 DIAGNOSIS — I50.9 HEART FAILURE, UNSPECIFIED: ICD-10-CM

## 2020-04-20 DIAGNOSIS — E66.9 OBESITY, UNSPECIFIED: ICD-10-CM

## 2020-04-20 DIAGNOSIS — I48.20 CHRONIC ATRIAL FIBRILLATION, UNSPECIFIED: ICD-10-CM

## 2020-04-20 DIAGNOSIS — J44.1 CHRONIC OBSTRUCTIVE PULMONARY DISEASE WITH (ACUTE) EXACERBATION: ICD-10-CM

## 2020-04-20 DIAGNOSIS — I10 ESSENTIAL (PRIMARY) HYPERTENSION: ICD-10-CM

## 2020-04-20 DIAGNOSIS — R06.03 ACUTE RESPIRATORY DISTRESS: ICD-10-CM

## 2020-04-20 LAB
ANION GAP SERPL CALC-SCNC: 6 MMOL/L — SIGNIFICANT CHANGE UP (ref 5–17)
BUN SERPL-MCNC: 21 MG/DL — SIGNIFICANT CHANGE UP (ref 7–23)
CALCIUM SERPL-MCNC: 8.5 MG/DL — SIGNIFICANT CHANGE UP (ref 8.5–10.1)
CHLORIDE SERPL-SCNC: 99 MMOL/L — SIGNIFICANT CHANGE UP (ref 96–108)
CO2 SERPL-SCNC: 30 MMOL/L — SIGNIFICANT CHANGE UP (ref 22–31)
CREAT SERPL-MCNC: 0.99 MG/DL — SIGNIFICANT CHANGE UP (ref 0.5–1.3)
GLUCOSE SERPL-MCNC: 216 MG/DL — HIGH (ref 70–99)
POTASSIUM SERPL-MCNC: 3.3 MMOL/L — LOW (ref 3.5–5.3)
POTASSIUM SERPL-SCNC: 3.3 MMOL/L — LOW (ref 3.5–5.3)
SODIUM SERPL-SCNC: 135 MMOL/L — SIGNIFICANT CHANGE UP (ref 135–145)

## 2020-04-20 PROCEDURE — 99233 SBSQ HOSP IP/OBS HIGH 50: CPT

## 2020-04-20 PROCEDURE — 99223 1ST HOSP IP/OBS HIGH 75: CPT

## 2020-04-20 RX ORDER — AZITHROMYCIN 500 MG/1
500 TABLET, FILM COATED ORAL ONCE
Refills: 0 | Status: COMPLETED | OUTPATIENT
Start: 2020-04-20 | End: 2020-04-20

## 2020-04-20 RX ORDER — ALPRAZOLAM 0.25 MG
0.25 TABLET ORAL EVERY 12 HOURS
Refills: 0 | Status: DISCONTINUED | OUTPATIENT
Start: 2020-04-20 | End: 2020-04-23

## 2020-04-20 RX ORDER — AZITHROMYCIN 500 MG/1
500 TABLET, FILM COATED ORAL EVERY 24 HOURS
Refills: 0 | Status: DISCONTINUED | OUTPATIENT
Start: 2020-04-21 | End: 2020-04-25

## 2020-04-20 RX ORDER — AZITHROMYCIN 500 MG/1
TABLET, FILM COATED ORAL
Refills: 0 | Status: DISCONTINUED | OUTPATIENT
Start: 2020-04-20 | End: 2020-04-25

## 2020-04-20 RX ORDER — POTASSIUM CHLORIDE 20 MEQ
40 PACKET (EA) ORAL ONCE
Refills: 0 | Status: COMPLETED | OUTPATIENT
Start: 2020-04-20 | End: 2020-04-20

## 2020-04-20 RX ADMIN — Medication 40 MILLIGRAM(S): at 05:03

## 2020-04-20 RX ADMIN — Medication 650 MILLIGRAM(S): at 12:12

## 2020-04-20 RX ADMIN — CLOPIDOGREL BISULFATE 75 MILLIGRAM(S): 75 TABLET, FILM COATED ORAL at 12:12

## 2020-04-20 RX ADMIN — Medication 100 MILLIGRAM(S): at 21:18

## 2020-04-20 RX ADMIN — ENOXAPARIN SODIUM 40 MILLIGRAM(S): 100 INJECTION SUBCUTANEOUS at 22:25

## 2020-04-20 RX ADMIN — ALBUTEROL 2 PUFF(S): 90 AEROSOL, METERED ORAL at 08:30

## 2020-04-20 RX ADMIN — Medication 0.25 MILLIGRAM(S): at 12:49

## 2020-04-20 RX ADMIN — BUDESONIDE AND FORMOTEROL FUMARATE DIHYDRATE 2 PUFF(S): 160; 4.5 AEROSOL RESPIRATORY (INHALATION) at 08:12

## 2020-04-20 RX ADMIN — BENZOCAINE AND MENTHOL 1 LOZENGE: 5; 1 LIQUID ORAL at 01:52

## 2020-04-20 RX ADMIN — ALBUTEROL 2 PUFF(S): 90 AEROSOL, METERED ORAL at 12:00

## 2020-04-20 RX ADMIN — ACETAZOLAMIDE 125 MILLIGRAM(S): 250 TABLET ORAL at 12:07

## 2020-04-20 RX ADMIN — Medication 100 MILLIGRAM(S): at 01:52

## 2020-04-20 RX ADMIN — GABAPENTIN 400 MILLIGRAM(S): 400 CAPSULE ORAL at 21:18

## 2020-04-20 RX ADMIN — Medication 180 MILLIGRAM(S): at 05:02

## 2020-04-20 RX ADMIN — ENOXAPARIN SODIUM 40 MILLIGRAM(S): 100 INJECTION SUBCUTANEOUS at 12:13

## 2020-04-20 RX ADMIN — GABAPENTIN 400 MILLIGRAM(S): 400 CAPSULE ORAL at 14:54

## 2020-04-20 RX ADMIN — Medication 0.25 MILLIGRAM(S): at 21:19

## 2020-04-20 RX ADMIN — GABAPENTIN 400 MILLIGRAM(S): 400 CAPSULE ORAL at 05:03

## 2020-04-20 RX ADMIN — BUDESONIDE AND FORMOTEROL FUMARATE DIHYDRATE 2 PUFF(S): 160; 4.5 AEROSOL RESPIRATORY (INHALATION) at 20:08

## 2020-04-20 RX ADMIN — Medication 40 MILLIGRAM(S): at 21:19

## 2020-04-20 RX ADMIN — AZITHROMYCIN 255 MILLIGRAM(S): 500 TABLET, FILM COATED ORAL at 18:43

## 2020-04-20 RX ADMIN — ALBUTEROL 2 PUFF(S): 90 AEROSOL, METERED ORAL at 20:08

## 2020-04-20 RX ADMIN — ALBUTEROL 2 PUFF(S): 90 AEROSOL, METERED ORAL at 03:29

## 2020-04-20 RX ADMIN — Medication 40 MILLIGRAM(S): at 18:43

## 2020-04-20 RX ADMIN — ALBUTEROL 2 PUFF(S): 90 AEROSOL, METERED ORAL at 16:10

## 2020-04-20 RX ADMIN — Medication 81 MILLIGRAM(S): at 12:12

## 2020-04-20 RX ADMIN — PANTOPRAZOLE SODIUM 40 MILLIGRAM(S): 20 TABLET, DELAYED RELEASE ORAL at 05:03

## 2020-04-20 RX ADMIN — Medication 40 MILLIGRAM(S): at 14:54

## 2020-04-20 RX ADMIN — Medication 40 MILLIEQUIVALENT(S): at 12:12

## 2020-04-20 RX ADMIN — TAMSULOSIN HYDROCHLORIDE 0.4 MILLIGRAM(S): 0.4 CAPSULE ORAL at 21:18

## 2020-04-20 NOTE — CONSULT NOTE ADULT - PROBLEM SELECTOR RECOMMENDATION 6
morbid obesity , hx of VIJAY , not compliant to cpap ,  machine was returned to company , patient was advised to follow up  Pulmonary . weight loss recommended ,

## 2020-04-20 NOTE — CONSULT NOTE ADULT - PROBLEM SELECTOR RECOMMENDATION 2
possible component of his symptoms , diastolic HF  due to afib with rapid rate , mild elevated BNP , continue diuretic , monitor renal function

## 2020-04-20 NOTE — PROGRESS NOTE ADULT - SUBJECTIVE AND OBJECTIVE BOX
Subjective:    pat better, sitting in chair, no new complaint.    Home Medications:  albuterol 2.5 mg/3 mL (0.083%) inhalation solution: 3 milliliter(s) inhaled every 6 hours, As Needed -for shortness of breath and/or wheezing (2020 03:12)  gabapentin 400 mg oral capsule: 1 cap(s) orally 3 times a day (2020 03:12)  pantoprazole 40 mg oral granule, enteric coated: 40 milligram(s) orally once a day (2020 03:12)  Spiriva 18 mcg inhalation capsule: 1 cap(s) inhaled once a day (2020 03:12)  tamsulosin 0.4 mg oral capsule: 1 cap(s) orally once a day (at bedtime) (2020 03:12)  traZODone 50 mg oral tablet: 2 tab(s) orally once a day (at bedtime), As Needed (2020 03:12)    MEDICATIONS  (STANDING):  acetaZOLAMIDE    Tablet 125 milliGRAM(s) Oral daily  ALBUTerol    90 MICROgram(s) HFA Inhaler 2 Puff(s) Inhalation every 4 hours  aspirin enteric coated 81 milliGRAM(s) Oral daily  budesonide 160 MICROgram(s)/formoterol 4.5 MICROgram(s) Inhaler 2 Puff(s) Inhalation two times a day  clopidogrel Tablet 75 milliGRAM(s) Oral daily  diltiazem    milliGRAM(s) Oral daily  enoxaparin Injectable 40 milliGRAM(s) SubCutaneous every 12 hours  furosemide   Injectable 40 milliGRAM(s) IV Push two times a day  gabapentin 400 milliGRAM(s) Oral three times a day  methylPREDNISolone sodium succinate Injectable 40 milliGRAM(s) IV Push every 8 hours  pantoprazole    Tablet 40 milliGRAM(s) Oral before breakfast  tamsulosin 0.4 milliGRAM(s) Oral at bedtime  tiotropium 18 MICROgram(s) Capsule 1 Capsule(s) Inhalation daily    MEDICATIONS  (PRN):  acetaminophen   Tablet .. 650 milliGRAM(s) Oral every 6 hours PRN Temp greater or equal to 38C (100.4F), Mild Pain (1 - 3)  ALPRAZolam 0.25 milliGRAM(s) Oral every 12 hours PRN anxiety  aluminum hydroxide/magnesium hydroxide/simethicone Suspension 30 milliLiter(s) Oral every 4 hours PRN Dyspepsia  traZODone 100 milliGRAM(s) Oral at bedtime PRN insomnia      Allergies    Ceclor (Rash)  Duricef (Rash)    Intolerances        Vital Signs Last 24 Hrs  T(C): 36.4 (2020 10:05), Max: 38.9 (2020 21:50)  T(F): 97.5 (2020 10:05), Max: 102 (2020 21:50)  HR: 93 (2020 10:05) (81 - 93)  BP: 108/61 (2020 10:05) (108/61 - 124/58)  BP(mean): 72 (2020 21:50) (72 - 72)  RR: 24 (2020 10:05) (24 - 26)  SpO2: 96% (2020 10:05) (94% - 97%)      PHYSICAL EXAMINATION:    NECK:  Supple. No lymphadenopathy. Jugular venous pressure not elevated. Carotids equal.   HEART:   The cardiac impulse has a normal quality. Reg., Nl S1 and S2.  There are no murmurs, rubs or gallops noted  CHEST:  Chest crackles to auscultation. Normal respiratory effort.  ABDOMEN:  Soft and nontender.   EXTREMITIES:  There is no edema.       LABS:                        14.7   4.71  )-----------( 139      ( 2020 08:19 )             45.3     -    135  |  99  |  21  ----------------------------<  216<H>  3.3<L>   |  30  |  0.99    Ca    8.5      2020 07:26  Mg     1.8         TPro  8.0  /  Alb  3.7  /  TBili  0.5  /  DBili  x   /  AST  26  /  ALT  22  /  AlkPhos  162<H>  04-18    PT/INR - ( 2020 19:43 )   PT: 11.7 sec;   INR: 1.05 ratio         PTT - ( 2020 19:43 )  PTT:34.8 sec  Urinalysis Basic - ( 2020 19:43 )    Color: Yellow / Appearance: Clear / S.010 / pH: x  Gluc: x / Ketone: Negative  / Bili: Negative / Urobili: Negative mg/dL   Blood: x / Protein: Negative mg/dL / Nitrite: Negative   Leuk Esterase: Negative / RBC: >50 /HPF / WBC 0-2   Sq Epi: x / Non Sq Epi: Negative / Bacteria: Negative    US Duplex Venous Lower Ext Complete, Bilateral (20 @ 16:17) >  IMPRESSION:     No evidence of deep venous thrombosis in either lower extremity.    CT Chest No Cont (20 @ 13:20) >  IMPRESSION:     Basilar atelectasis.

## 2020-04-20 NOTE — CONSULT NOTE ADULT - PROBLEM SELECTOR RECOMMENDATION 9
multifactorial mostly due to exacerbation of COPD , possible acute on chronic diastolic heart failure associated with afib with rapid rate ,  now improving

## 2020-04-20 NOTE — CONSULT NOTE ADULT - PROBLEM SELECTOR RECOMMENDATION 3
still wheezing ,  continue bronchodilator and steroid ,  further plan as per hospitalist and pulmonary ,

## 2020-04-20 NOTE — PROGRESS NOTE ADULT - ASSESSMENT
PROBLEMS;    Ac on chronic hypercapnic & hypoxamic respiratory failure  OHS/VIJAY  AC flare of COPD/chronic vs acute on chronic bronchitis  Mild interstitial lung disease-NSIP h/o smoking  COVID negativex2  Pulmonary hypertension  Chronic afib w Watchman device  CHF  BPH  GERD    PLAN;    pulmonary better  iv solumedrols 40mg q8hr  aerosols  supportive care  covid repeat testing-neg  d/w staff  dvt prophylasix

## 2020-04-20 NOTE — CONSULT NOTE ADULT - SUBJECTIVE AND OBJECTIVE BOX
CHIEF COMPLAINT:    HPI:Mr. Boone is a 65 year old male with PMHx HFPEF (60% 12/2018), EtOH abuse, A-Fib s/p watchman procedure on 4/2/19, anemia, CHF, COPD, GERD, cirrhosis, HTN, s/p left BKA, NL Nuclear stress test 12/2018   obesity came to ED w EMS c/o SOB since the morning of 04-18 which got worsen , patient did have fever for two days he says , with cough, patient apparently had ordered food from outside day before ,  Patient was diagnosed to have VIJAY however he does not cpap at home  he uses nebulizer treatment often     per EMS, pt was in rapid afib in 180s upon EMS arrival with O2 sat in 80s. pt placed on NRB via EMS now with O2 sat of 97.   pt given 125 solumedrol and 30 mg cardizem via EMS.    In ED /85  HR 99  RR 25  T 98.6  97% sat NRB 15 L  given solumedrol 125 mg by ED as well as albuterol x 1. magnesium IV  CXR clear; COVID-19 not detected  ABG revealed hypercapnia x 2; trial of BiPAP    patient is feeling much better ,  his heart rate is controlled , patient denies any fever or chills since he came in ,  patient does have chronic right LE swelling , patient was on ventilator/tracheostomy due to trauma , his left BKA was done due to traumatic injury with complication ,  no hx of PAD .  Patient denies any chest pain           PAST MEDICAL HX  Alcohol abuse    Anemia    CHF (congestive heart failure)  diastolic   Chronic atrial fibrillation    Chronic obstructive pulmonary disease (COPD)    Cirrhosis    Collapsed lung    Falls    GERD (gastroesophageal reflux disease)    HTN hypertension    Meningitis    Poor historian    Presence of Watchman left atrial appendage closure device.  S/P BKA (below knee amputation) unilateral, left    Unilateral amputation of lower extremity below knee.      FAMILY HX  Family history unknown: Adopted      SOCIAL HX  lives alone  former smoker  alcohol use last 2-3 days ago (19 Apr 2020 03:19)          Allergies    Ceclor (Rash)  Duricef (Rash)    Intolerances        SOCIAL HISTORY: former smoker     FAMILY HISTORY:  No pertinent family history in first degree relatives  Family history unknown: Adopted      MEDICATIONS:  MEDICATIONS  (STANDING):  acetaZOLAMIDE    Tablet 125 milliGRAM(s) Oral daily  ALBUTerol    90 MICROgram(s) HFA Inhaler 2 Puff(s) Inhalation every 4 hours  aspirin enteric coated 81 milliGRAM(s) Oral daily  budesonide 160 MICROgram(s)/formoterol 4.5 MICROgram(s) Inhaler 2 Puff(s) Inhalation two times a day  clopidogrel Tablet 75 milliGRAM(s) Oral daily  diltiazem    milliGRAM(s) Oral daily  enoxaparin Injectable 40 milliGRAM(s) SubCutaneous every 12 hours  furosemide   Injectable 40 milliGRAM(s) IV Push two times a day  gabapentin 400 milliGRAM(s) Oral three times a day  methylPREDNISolone sodium succinate Injectable 40 milliGRAM(s) IV Push every 8 hours  pantoprazole    Tablet 40 milliGRAM(s) Oral before breakfast  potassium chloride    Tablet ER 40 milliEquivalent(s) Oral once  tamsulosin 0.4 milliGRAM(s) Oral at bedtime  tiotropium 18 MICROgram(s) Capsule 1 Capsule(s) Inhalation daily    MEDICATIONS  (PRN):  acetaminophen   Tablet .. 650 milliGRAM(s) Oral every 6 hours PRN Temp greater or equal to 38C (100.4F), Mild Pain (1 - 3)  aluminum hydroxide/magnesium hydroxide/simethicone Suspension 30 milliLiter(s) Oral every 4 hours PRN Dyspepsia  traZODone 100 milliGRAM(s) Oral at bedtime PRN insomnia      REVIEW OF SYSTEMS:    CONSTITUTIONAL: No weakness, fevers or chills  EYES/ENT: No visual changes;  No vertigo or throat pain   NECK: No pain or stiffness  RESPIRATORY:  sob better   CARDIOVASCULAR: No chest pain or palpitations  GASTROINTESTINAL: No abdominal or epigastric pain. No nausea, vomiting, or hematemesis; No diarrhea or constipation. No melena or hematochezia.  GENITOURINARY: No dysuria, frequency or hematuria  NEUROLOGICAL: No numbness or weakness  SKIN: No itching, burning, rashes, or lesions   All other review of systems is negative unless indicated above    Vital Signs Last 24 Hrs  T(C): 36.4 (20 Apr 2020 10:05), Max: 38.9 (19 Apr 2020 21:50)  T(F): 97.5 (20 Apr 2020 10:05), Max: 102 (19 Apr 2020 21:50)  HR: 93 (20 Apr 2020 10:05) (81 - 98)  BP: 108/61 (20 Apr 2020 10:05) (108/61 - 134/64)  BP(mean): 72 (19 Apr 2020 21:50) (72 - 72)  RR: 24 (20 Apr 2020 10:05) (24 - 26)  SpO2: 96% (20 Apr 2020 10:05) (94% - 97%)    I&O's Summary    19 Apr 2020 07:01  -  20 Apr 2020 07:00  --------------------------------------------------------  IN: 0 mL / OUT: 300 mL / NET: -300 mL        PHYSICAL EXAM:    Constitutional: NAD, awake and alert, Obesity   HEENT: PERR, EOMI,  No oral cyananosis.  Neck:  supple,  No JVD  Respiratory: Breath sounds are equal scattered wheeze , prolonged expiration diffusely   Cardiovascular: S1 and S2, IR IR , no Murmurs, gallops or rubs  Gastrointestinal: Bowel Sounds present, soft, nontender.   Extremities:  2 plus edema right LE   Left BKA   Vascular:  RLE feeble ,   Neurological: A/O x 3, no focal deficits  Musculoskeletal: no calf tenderness.  Skin: stasis erythremia       LABS: All Labs Reviewed:                        14.7   4.71  )-----------( 139      ( 19 Apr 2020 08:19 )             45.3                         15.4   8.11  )-----------( 144      ( 18 Apr 2020 19:43 )             47.3     20 Apr 2020 07:26    135    |  99     |  21     ----------------------------<  216    3.3     |  30     |  0.99   19 Apr 2020 08:19    134    |  98     |  12     ----------------------------<  186    3.7     |  29     |  0.99   18 Apr 2020 19:43    136    |  102    |  9      ----------------------------<  141    3.8     |  27     |  0.72     Ca    8.5        20 Apr 2020 07:26  Ca    8.8        19 Apr 2020 08:19  Ca    8.3        18 Apr 2020 19:43  Mg     1.8       19 Apr 2020 08:19    TPro  8.0    /  Alb  3.7    /  TBili  0.5    /  DBili  x      /  AST  26     /  ALT  22     /  AlkPhos  162    18 Apr 2020 19:43    PT/INR - ( 18 Apr 2020 19:43 )   PT: 11.7 sec;   INR: 1.05 ratio         PTT - ( 18 Apr 2020 19:43 )  PTT:34.8 sec  CARDIAC MARKERS ( 18 Apr 2020 19:43 )  <0.015 ng/mL / x     / 302 U/L / x     / x          Blood Culture: Organism --  Gram Stain Blood -- Gram Stain --  Specimen Source .Blood Blood-Peripheral  Culture-Blood --      04-18 @ 19:43  Pro Bnp [id number]            RADIOLOGY/EKG:  < from: 12 Lead ECG (04.18.20 @ 19:44) >  Diagnosis Line *** Poor data quality, interpretation may be adversely affected  Atrial fibrillation with rapid ventricular response  Rightward axis  Septal infarct (cited on or before 17-AUG-2017)  Abnormal ECG  Confirmed by LINDA KABA MD [id number]) on 4/19/2020 11:06:37 AM    < end of copied text >      Monitor afib with rat    < from: CT Chest No Cont (04.19.20 @ 13:20) >  IMPRESSION:     Basilar atelectasis.    < end of copied text >

## 2020-04-20 NOTE — PROGRESS NOTE ADULT - SUBJECTIVE AND OBJECTIVE BOX
Subjective:  Patient is a 65y old  Male who presents with a chief complaint of acute hypoxemic, hypercapneic resp failure COPD exacerbation (2020 07:41)    HPI:  65 year old male w chronic AFib w Watchman device, CHF, COPD, HTN, VIJAY, obesity came to ED w EMS c/o acute onset of SOB since the morning of .  Per EMS, pt was in rapid afib in 180s upon EMS arrival with O2 sat in 80s. pt placed on NRB via EMS now with O2 sat of 97.  Pt given 125 solumedrol and 30 mg cardizem via EMS.  Denies fevers, cough, chest pain. States never experienced similar sx before.  In ED /85  HR 99  RR 25  T 98.6  97% sat NRB 15 L.  Given solumedrol 125 mg by ED as well as albuterol x 1. magnesium IV.  CXR clear; COVID-19 not detected.  ABG revealed hypercapnea x 2; trial of BiPAP.      :  Pt seen.  Feeling much better than yesterday.  S/p NRB in ambulance/ ER and then BIPAP with improvement in sx.    :  Pt seen.  Slow improvement.  5L NC.      Review of system- Rest of the review of system are negative except mentioned in HPI.      Vital Signs Last 24 Hrs  T(C): 36.3 (2020 16:50), Max: 38.9 (2020 21:50)  T(F): 97.4 (2020 16:50), Max: 102 (2020 21:50)  HR: 73 (2020 16:50) (73 - 93)  BP: 131/55 (2020 16:50) (108/61 - 131/55)  BP(mean): 72 (2020 21:50) (72 - 72)  RR: 17 (2020 16:50) (17 - 26)  SpO2: 96% (2020 16:50) (94% - 96%)    PHYSICAL EXAM:  GENERAL: NAD  NERVOUS SYSTEM:  Alert & Oriented X3, non- focal exam, Motor Strength 5/5 B/L upper and lower extremities; DTRs 2+ intact and symmetric  HEAD:  Atraumatic, Normocephalic  EYES: EOMI, PERRLA, conjunctiva and sclera clear  HEENT: Moist mucous membranes  NECK: Supple, No JVD  CHEST/LUNG: prolonged expiration.  + wheeze  HEART: irregularly irregular  ABDOMEN: Soft, Nontender, Nondistended; Bowel sounds present  GENITOURINARY- Voiding, no suprapubic tenderness  EXTREMITIES:  AKA on left.  Edema 2+ on right.    MUSCULOSKELETAL:- No muscle tenderness, Muscle tone normal, No joint tenderness, no Joint swelling, Joint range of motion-normal  SKIN-no rash, no lesion    LABS:      135  |  99  |  21  ----------------------------<  216<H>  3.3<L>   |  30  |  0.99    Ca    8.5      2020 07:26  Mg     1.8         TPro  8.0  /  Alb  3.7  /  TBili  0.5  /  DBili  x   /  AST  26  /  ALT  22  /  AlkPhos  162<H>                              14.7   4.71  )-----------( 139      ( 2020 08:19 )             45.3       CARDIAC MARKERS ( 2020 19:43 )  <0.015 ng/mL / x     / 302 U/L / x     / x            LIVER FUNCTIONS - ( 2020 19:43 )  Alb: 3.7 g/dL / Pro: 8.0 gm/dL / ALK PHOS: 162 U/L / ALT: 22 U/L / AST: 26 U/L / GGT: x             PT/INR - ( 2020 19:43 )   PT: 11.7 sec;   INR: 1.05 ratio         PTT - ( 2020 19:43 )  PTT:34.8 sec    Urinalysis Basic - ( 2020 19:43 )    Color: Yellow / Appearance: Clear / S.010 / pH: x  Gluc: x / Ketone: Negative  / Bili: Negative / Urobili: Negative mg/dL   Blood: x / Protein: Negative mg/dL / Nitrite: Negative   Leuk Esterase: Negative / RBC: >50 /HPF / WBC 0-2   Sq Epi: x / Non Sq Epi: Negative / Bacteria: Negative      MEDICATIONS  (STANDING):  acetaZOLAMIDE    Tablet 125 milliGRAM(s) Oral daily  ALBUTerol    90 MICROgram(s) HFA Inhaler 2 Puff(s) Inhalation every 4 hours  aspirin enteric coated 81 milliGRAM(s) Oral daily  budesonide 160 MICROgram(s)/formoterol 4.5 MICROgram(s) Inhaler 2 Puff(s) Inhalation two times a day  clopidogrel Tablet 75 milliGRAM(s) Oral daily  diltiazem    milliGRAM(s) Oral daily  enoxaparin Injectable 40 milliGRAM(s) SubCutaneous every 12 hours  furosemide   Injectable 40 milliGRAM(s) IV Push two times a day  gabapentin 400 milliGRAM(s) Oral three times a day  methylPREDNISolone sodium succinate Injectable 40 milliGRAM(s) IV Push every 8 hours  pantoprazole    Tablet 40 milliGRAM(s) Oral before breakfast  tamsulosin 0.4 milliGRAM(s) Oral at bedtime  tiotropium 18 MICROgram(s) Capsule 1 Capsule(s) Inhalation daily    MEDICATIONS  (PRN):  acetaminophen   Tablet .. 650 milliGRAM(s) Oral every 6 hours PRN Temp greater or equal to 38C (100.4F), Mild Pain (1 - 3)  ALPRAZolam 0.25 milliGRAM(s) Oral every 12 hours PRN anxiety  aluminum hydroxide/magnesium hydroxide/simethicone Suspension 30 milliLiter(s) Oral every 4 hours PRN Dyspepsia  traZODone 100 milliGRAM(s) Oral at bedtime PRN insomnia

## 2020-04-20 NOTE — PROGRESS NOTE ADULT - ASSESSMENT
65 year old male w chronic AFib w Watchman device, CHF, COPD, HTN, obesity came to ED w EMS c/o SOB since the morning of 04-18.  Found to have acute on chronic respiratory failure due to COPD exacerbation/ rapid AFIB/ CHF exacerbation.      #Acute on chronic hypercapnic + hypoxemic respiratory failure:    - Suspect uncontrolled afib/ CHF triggering COPD exacerabtion  - COVID negative x2.    - CT chest negative-- no PNA/ no COVID changes.    - DDimer <150  - S/p NRB and BIPAP on admission  - Supplemental O2 NC @ 6L    - c/w solumedrol 40 mg q 8 hrs  - Spiriva 18 mg   - Add advair BID  - Add azithro  - albuterol MDI q4 hours  - Pulm consulted  - will need o2 determination prior to d/c.      #Acute on Chronic Systolic CHF exacerbation:    - suspect more right heart failure.    - previous ECHO noted.    - improved with BIPAP on admission  - start lasix 40 IV BID.    - cont home Acetazolamide to help with diuresis and contraction alkalosis  - Daily weights  - ASA 81 mg  - Plavix 75 mg  - cardio eval     #Hypokalemia:    Replete and recheck.      #Persistent afib with uncontrolled rate on admission:    - 's on admission-- improved after IV diltiazem.    - Cont home diltiazem  mg daily  - cont tele for now.    - cardio eval    #VIJAY  - Not compliant with CPAP @ home    #BPH  - c/w Flomax 0.4 mg    #GERD  - c/w Protonix 40 mg    #Preventive Measures  - VTE: Lovenox

## 2020-04-21 ENCOUNTER — RX RENEWAL (OUTPATIENT)
Age: 65
End: 2020-04-21

## 2020-04-21 DIAGNOSIS — R31.9 HEMATURIA, UNSPECIFIED: ICD-10-CM

## 2020-04-21 LAB
ANION GAP SERPL CALC-SCNC: 5 MMOL/L — SIGNIFICANT CHANGE UP (ref 5–17)
APPEARANCE UR: CLEAR — SIGNIFICANT CHANGE UP
BILIRUB UR-MCNC: NEGATIVE — SIGNIFICANT CHANGE UP
BUN SERPL-MCNC: 21 MG/DL — SIGNIFICANT CHANGE UP (ref 7–23)
CALCIUM SERPL-MCNC: 8.8 MG/DL — SIGNIFICANT CHANGE UP (ref 8.5–10.1)
CHLORIDE SERPL-SCNC: 97 MMOL/L — SIGNIFICANT CHANGE UP (ref 96–108)
CO2 SERPL-SCNC: 33 MMOL/L — HIGH (ref 22–31)
COLOR SPEC: YELLOW — SIGNIFICANT CHANGE UP
CREAT SERPL-MCNC: 0.85 MG/DL — SIGNIFICANT CHANGE UP (ref 0.5–1.3)
DIFF PNL FLD: ABNORMAL
GLUCOSE SERPL-MCNC: 125 MG/DL — HIGH (ref 70–99)
GLUCOSE UR QL: NEGATIVE MG/DL — SIGNIFICANT CHANGE UP
HCT VFR BLD CALC: 45.2 % — SIGNIFICANT CHANGE UP (ref 39–50)
HGB BLD-MCNC: 14.5 G/DL — SIGNIFICANT CHANGE UP (ref 13–17)
KETONES UR-MCNC: NEGATIVE — SIGNIFICANT CHANGE UP
LEUKOCYTE ESTERASE UR-ACNC: NEGATIVE — SIGNIFICANT CHANGE UP
MCHC RBC-ENTMCNC: 32.1 GM/DL — SIGNIFICANT CHANGE UP (ref 32–36)
MCHC RBC-ENTMCNC: 32.2 PG — SIGNIFICANT CHANGE UP (ref 27–34)
MCV RBC AUTO: 100.2 FL — HIGH (ref 80–100)
NITRITE UR-MCNC: NEGATIVE — SIGNIFICANT CHANGE UP
PH UR: 6.5 — SIGNIFICANT CHANGE UP (ref 5–8)
PLATELET # BLD AUTO: 129 K/UL — LOW (ref 150–400)
POTASSIUM SERPL-MCNC: 3.3 MMOL/L — LOW (ref 3.5–5.3)
POTASSIUM SERPL-SCNC: 3.3 MMOL/L — LOW (ref 3.5–5.3)
PROT UR-MCNC: NEGATIVE MG/DL — SIGNIFICANT CHANGE UP
RBC # BLD: 4.51 M/UL — SIGNIFICANT CHANGE UP (ref 4.2–5.8)
RBC # FLD: 13.3 % — SIGNIFICANT CHANGE UP (ref 10.3–14.5)
SODIUM SERPL-SCNC: 135 MMOL/L — SIGNIFICANT CHANGE UP (ref 135–145)
SP GR SPEC: 1 — LOW (ref 1.01–1.02)
UROBILINOGEN FLD QL: NEGATIVE MG/DL — SIGNIFICANT CHANGE UP
WBC # BLD: 9.51 K/UL — SIGNIFICANT CHANGE UP (ref 3.8–10.5)
WBC # FLD AUTO: 9.51 K/UL — SIGNIFICANT CHANGE UP (ref 3.8–10.5)

## 2020-04-21 PROCEDURE — 99231 SBSQ HOSP IP/OBS SF/LOW 25: CPT

## 2020-04-21 PROCEDURE — 93308 TTE F-UP OR LMTD: CPT | Mod: 26

## 2020-04-21 PROCEDURE — 99233 SBSQ HOSP IP/OBS HIGH 50: CPT

## 2020-04-21 PROCEDURE — 99232 SBSQ HOSP IP/OBS MODERATE 35: CPT

## 2020-04-21 RX ORDER — FUROSEMIDE 40 MG
40 TABLET ORAL
Refills: 0 | Status: DISCONTINUED | OUTPATIENT
Start: 2020-04-21 | End: 2020-04-22

## 2020-04-21 RX ORDER — POTASSIUM CHLORIDE 20 MEQ
40 PACKET (EA) ORAL EVERY 4 HOURS
Refills: 0 | Status: COMPLETED | OUTPATIENT
Start: 2020-04-21 | End: 2020-04-21

## 2020-04-21 RX ADMIN — AZITHROMYCIN 255 MILLIGRAM(S): 500 TABLET, FILM COATED ORAL at 17:35

## 2020-04-21 RX ADMIN — ENOXAPARIN SODIUM 40 MILLIGRAM(S): 100 INJECTION SUBCUTANEOUS at 22:07

## 2020-04-21 RX ADMIN — Medication 180 MILLIGRAM(S): at 04:56

## 2020-04-21 RX ADMIN — Medication 40 MILLIGRAM(S): at 04:56

## 2020-04-21 RX ADMIN — PANTOPRAZOLE SODIUM 40 MILLIGRAM(S): 20 TABLET, DELAYED RELEASE ORAL at 04:56

## 2020-04-21 RX ADMIN — Medication 60 MILLIGRAM(S): at 22:07

## 2020-04-21 RX ADMIN — Medication 100 MILLIGRAM(S): at 22:07

## 2020-04-21 RX ADMIN — ENOXAPARIN SODIUM 40 MILLIGRAM(S): 100 INJECTION SUBCUTANEOUS at 11:31

## 2020-04-21 RX ADMIN — ALBUTEROL 2 PUFF(S): 90 AEROSOL, METERED ORAL at 04:57

## 2020-04-21 RX ADMIN — GABAPENTIN 400 MILLIGRAM(S): 400 CAPSULE ORAL at 22:07

## 2020-04-21 RX ADMIN — TAMSULOSIN HYDROCHLORIDE 0.4 MILLIGRAM(S): 0.4 CAPSULE ORAL at 22:07

## 2020-04-21 RX ADMIN — Medication 60 MILLIGRAM(S): at 14:24

## 2020-04-21 RX ADMIN — GABAPENTIN 400 MILLIGRAM(S): 400 CAPSULE ORAL at 04:56

## 2020-04-21 RX ADMIN — GABAPENTIN 400 MILLIGRAM(S): 400 CAPSULE ORAL at 13:53

## 2020-04-21 RX ADMIN — Medication 40 MILLIEQUIVALENT(S): at 17:35

## 2020-04-21 RX ADMIN — Medication 81 MILLIGRAM(S): at 11:32

## 2020-04-21 RX ADMIN — Medication 0.25 MILLIGRAM(S): at 22:07

## 2020-04-21 RX ADMIN — Medication 40 MILLIGRAM(S): at 17:35

## 2020-04-21 RX ADMIN — TIOTROPIUM BROMIDE 1 CAPSULE(S): 18 CAPSULE ORAL; RESPIRATORY (INHALATION) at 11:31

## 2020-04-21 RX ADMIN — CLOPIDOGREL BISULFATE 75 MILLIGRAM(S): 75 TABLET, FILM COATED ORAL at 11:33

## 2020-04-21 RX ADMIN — Medication 40 MILLIEQUIVALENT(S): at 13:53

## 2020-04-21 RX ADMIN — ACETAZOLAMIDE 125 MILLIGRAM(S): 250 TABLET ORAL at 11:32

## 2020-04-21 RX ADMIN — Medication 40 MILLIGRAM(S): at 04:57

## 2020-04-21 NOTE — PROGRESS NOTE ADULT - SUBJECTIVE AND OBJECTIVE BOX
Patient is a 65y old  Male who presents with a chief complaint of acute hypoxemic, hypercapneic resp failure  COPD exacerbation (2020 08:12)      SUBJECTIVE / OVERNIGHT EVENTS:  Pt seen and examined at bedside  No acute events overnight  Pt reports that he continues to have hematuria. Denies any suprapubic discomfort, dysuria or increased frequency. States this has happened in the past  Reports that he is slowly feeling better since admission with regards to his breathing  He denies F/C, CP, Diarrhea, Abdominal pain    Tele: Hr        MEDICATIONS  (STANDING):  acetaZOLAMIDE    Tablet 125 milliGRAM(s) Oral daily  ALBUTerol    90 MICROgram(s) HFA Inhaler 2 Puff(s) Inhalation every 4 hours  aspirin enteric coated 81 milliGRAM(s) Oral daily  azithromycin  IVPB      azithromycin  IVPB 500 milliGRAM(s) IV Intermittent every 24 hours  budesonide 160 MICROgram(s)/formoterol 4.5 MICROgram(s) Inhaler 2 Puff(s) Inhalation two times a day  clopidogrel Tablet 75 milliGRAM(s) Oral daily  diltiazem    milliGRAM(s) Oral daily  enoxaparin Injectable 40 milliGRAM(s) SubCutaneous every 12 hours  furosemide    Tablet 40 milliGRAM(s) Oral two times a day  gabapentin 400 milliGRAM(s) Oral three times a day  methylPREDNISolone sodium succinate Injectable 40 milliGRAM(s) IV Push every 8 hours  pantoprazole    Tablet 40 milliGRAM(s) Oral before breakfast  potassium chloride    Tablet ER 40 milliEquivalent(s) Oral every 4 hours  tamsulosin 0.4 milliGRAM(s) Oral at bedtime  tiotropium 18 MICROgram(s) Capsule 1 Capsule(s) Inhalation daily    MEDICATIONS  (PRN):  acetaminophen   Tablet .. 650 milliGRAM(s) Oral every 6 hours PRN Temp greater or equal to 38C (100.4F), Mild Pain (1 - 3)  ALPRAZolam 0.25 milliGRAM(s) Oral every 12 hours PRN anxiety  aluminum hydroxide/magnesium hydroxide/simethicone Suspension 30 milliLiter(s) Oral every 4 hours PRN Dyspepsia  traZODone 100 milliGRAM(s) Oral at bedtime PRN insomnia      Vital Signs Last 24 Hrs  T(C): 36.6 (2020 11:10), Max: 36.7 (2020 20:49)  T(F): 97.8 (2020 11:10), Max: 98.1 (2020 20:49)  HR: 109 (2020 11:10) (73 - 109)  BP: 120/61 (2020 11:10) (120/61 - 131/67)  RR: 20 (2020 11:10) (17 - 20)  SpO2: 93% (2020 11:10) (93% - 97%)      I&O's Summary    2020 07:01  -  2020 07:00  --------------------------------------------------------  IN: 1600 mL / OUT: 2290 mL / NET: -690 mL      PHYSICAL EXAM  GENERAL: NAD, well-developed  HEAD:  Atraumatic, Normocephalic  EYES: EOMI, PERRLA, conjunctiva and sclera clear  NECK: Supple, No JVD  CHEST/LUNG: Diffuse expiratory wheezing. NRD. No use of accessory muscles. No Rhonchi. 4L O2 via NC sating in the 90's  HEART: Regular rate and rhythm; No murmurs, rubs, or gallops  ABDOMEN: Soft, Nontender, Nondistended; Bowel sounds present  EXTREMITIES:  2+ Peripheral Pulses, No clubbing, cyanosis, or edema  PSYCH: AAOx3  SKIN: No rashes or lesions      LABS:        135  |  97  |  21  ----------------------------<  125<H>  3.3<L>   |  33<H>  |  0.85    Ca    8.8      2020 07:22      Urinalysis Basic - ( 2020 06:01 )    Color: Yellow / Appearance: Clear / S.005 / pH: x  Gluc: x / Ketone: Negative  / Bili: Negative / Urobili: Negative mg/dL   Blood: x / Protein: Negative mg/dL / Nitrite: Negative   Leuk Esterase: Negative / RBC: >50 /HPF / WBC 0-2   Sq Epi: x / Non Sq Epi: Negative / Bacteria: Negative        Culture - Blood (collected 2020 19:43)  Source: .Blood Blood-Peripheral  Preliminary Report (2020 01:01):    No growth to date.        RADIOLOGY & ADDITIONAL TESTS:    Imaging Personally Reviewed:  Consultant(s) Notes Reviewed:    Care Discussed with Consultants/Other Providers:

## 2020-04-21 NOTE — PROGRESS NOTE ADULT - SUBJECTIVE AND OBJECTIVE BOX
CHIEF COMPLAINT:    HPI:Mr. Boone is a 65 year old male with PMHx HFPEF (60% 2018), EtOH abuse, A-Fib s/p watchman procedure on 19, anemia, CHF, COPD, GERD, cirrhosis, HTN, s/p left BKA, NL Nuclear stress test 2018   obesity came to ED w EMS c/o SOB since the morning of  which got worsen , patient did have fever for two days he says , with cough, patient apparently had ordered food from outside day before ,  Patient was diagnosed to have VIJAY however he does not cpap at home  he uses nebulizer treatment often     per EMS, pt was in rapid afib in 180s upon EMS arrival with O2 sat in 80s. pt placed on NRB via EMS now with O2 sat of 97.   pt given 125 solumedrol and 30 mg cardizem via EMS.    In ED /85  HR 99  RR 25  T 98.6  97% sat NRB 15 L  given solumedrol 125 mg by ED as well as albuterol x 1. magnesium IV  CXR clear; COVID-19 not detected  ABG revealed hypercapnia x 2; trial of BiPAP    patient is feeling much better ,  his heart rate is controlled , patient denies any fever or chills since he came in ,  patient does have chronic right LE swelling , patient was on ventilator/tracheostomy due to trauma , his left BKA was done due to traumatic injury with complication ,  no hx of PAD .  Patient denies any chest pain     20 Patient complain of wheezing ,no sob at rest , on 4 L NC O2  despite of being on IV lasix ,  heart rate is 80 to 100s            PAST MEDICAL HX  Alcohol abuse    Anemia    CHF (congestive heart failure)  diastolic   Chronic atrial fibrillation    Chronic obstructive pulmonary disease (COPD)    Cirrhosis    Collapsed lung    Falls    GERD (gastroesophageal reflux disease)    HTN hypertension    Meningitis    Poor historian    Presence of Watchman left atrial appendage closure device.  S/P BKA (below knee amputation) unilateral, left    Unilateral amputation of lower extremity below knee.          SOCIAL HX  lives alone  former smoker  MEDICATIONS  (STANDING):  acetaZOLAMIDE    Tablet 125 milliGRAM(s) Oral daily  ALBUTerol    90 MICROgram(s) HFA Inhaler 2 Puff(s) Inhalation every 4 hours  aspirin enteric coated 81 milliGRAM(s) Oral daily  azithromycin  IVPB      azithromycin  IVPB 500 milliGRAM(s) IV Intermittent every 24 hours  budesonide 160 MICROgram(s)/formoterol 4.5 MICROgram(s) Inhaler 2 Puff(s) Inhalation two times a day  clopidogrel Tablet 75 milliGRAM(s) Oral daily  diltiazem    milliGRAM(s) Oral daily  enoxaparin Injectable 40 milliGRAM(s) SubCutaneous every 12 hours  furosemide   Injectable 40 milliGRAM(s) IV Push two times a day  gabapentin 400 milliGRAM(s) Oral three times a day  methylPREDNISolone sodium succinate Injectable 40 milliGRAM(s) IV Push every 8 hours  pantoprazole    Tablet 40 milliGRAM(s) Oral before breakfast  tamsulosin 0.4 milliGRAM(s) Oral at bedtime  tiotropium 18 MICROgram(s) Capsule 1 Capsule(s) Inhalation daily    MEDICATIONS  (PRN):  acetaminophen   Tablet .. 650 milliGRAM(s) Oral every 6 hours PRN Temp greater or equal to 38C (100.4F), Mild Pain (1 - 3)  ALPRAZolam 0.25 milliGRAM(s) Oral every 12 hours PRN anxiety  aluminum hydroxide/magnesium hydroxide/simethicone Suspension 30 milliLiter(s) Oral every 4 hours PRN Dyspepsia  traZODone 100 milliGRAM(s) Oral at bedtime PRN insomnia  alcohol use last 2-3 days ago (2020 03:19)          REVIEW OF SYSTEMS:    hx of falls ,  wheezing   All other review of systems is negative unless indicated above    Vital Signs Last 24 Hrs  T(C): 36.3 (2020 04:46), Max: 36.7 (2020 20:49)  T(F): 97.4 (2020 04:46), Max: 98.1 (2020 20:49)  HR: 99 (2020 04:46) (73 - 103)  BP: 130/65 (2020 04:46) (108/61 - 131/67)  BP(mean): --  RR: 19 (2020 04:46) (17 - 24)  SpO2: 97% (2020 04:46) (96% - 97%)    I&O's Summary    2020 07:01  -  2020 07:00  --------------------------------------------------------  IN: 1600 mL / OUT: 2290 mL / NET: -690 mL          PHYSICAL EXAM:    Constitutional: NAD, awake and alert, Obesity   HEENT: PERR, EOMI,  No oral cyananosis.  Neck:  supple,  No JVD  Respiratory: Breath sounds are equal , mild diffuse expiratory  wheeze , prolonged expiration diffusely   Cardiovascular: S1 and S2, IR IR , no Murmurs, gallops or rubs  Gastrointestinal: Bowel Sounds present, soft, nontender.   Extremities:  2 plus edema right LE   Left BKA   Vascular:  RLE feeble ,   Neurological: A/O x 3, no focal deficits  Musculoskeletal: no calf tenderness.  Skin: stasis erythremia       LABS: All Labs Reviewed:                            14.7   4.71  )-----------( 139      ( 2020 08:19 )             45.3         135  |  99  |  21  ----------------------------<  216<H>  3.3<L>   |  30  |  0.99    Ca    8.5      2020 07:26  Mg     1.8                   Urinalysis Basic - ( 2020 06:01 )    Color: Yellow / Appearance: Clear / S.005 / pH: x  Gluc: x / Ketone: Negative  / Bili: Negative / Urobili: Negative mg/dL   Blood: x / Protein: Negative mg/dL / Nitrite: Negative   Leuk Esterase: Negative / RBC: >50 /HPF / WBC 0-2   Sq Epi: x / Non Sq Epi: Negative / Bacteria: Negative        Culture Results:   No growth to date. (20 @ 19:43)              RADIOLOGY/EKG:  < from: 12 Lead ECG (20 @ 19:44) >  Diagnosis Line *** Poor data quality, interpretation may be adversely affected  Atrial fibrillation with rapid ventricular response  Rightward axis  Septal infarct (cited on or before 17-AUG-2017)  Abnormal ECG  Confirmed by LINDA KABA MD ([id number]) on 2020 11:06:37 AM    < end of copied text >      Monitor afib with rat    < from: CT Chest No Cont (20 @ 13:20) >  IMPRESSION:     Basilar atelectasis.    < end of copied text >    < from: Transthoracic Echocardiogram (06.10.19 @ 20:49) >   Estimated left ventricular ejection fraction is 60-65 %.   The left ventricle is normal in wall thickness, wall motion and   contractility as seen in limited views.   The left ventricle cavity is moderately dilated.   The left atrium is severely dilated.   The right atrium appears severely dilated.   Normal appearing right ventricle structure and function.   Normal aortic valve structure and function.   The mitral valve leaflets appear thin and normal.   Moderate (2+) mitral regurgitation is present.   Normal appearing tricuspid valve structure.   Mild to Moderate Tricuspid regurgitation is present.   Pulmonic valve not well seen.   No evidence of pericardial effusion.   No evidence of pleural effusion.   IVC is dilated and not collapsing with inspiration.    < end of copied text >

## 2020-04-21 NOTE — PROGRESS NOTE ADULT - ASSESSMENT
PROBLEMS;    Ac on chronic hypercapnic & hypoxamic respiratory failure  OHS/VIJAY  AC flare of COPD/chronic vs acute on chronic bronchitis  Mild interstitial lung disease-NSIP h/o smoking  COVID negativex2  Pulmonary hypertension  Chronic afib w Watchman device  CHF  BPH  GERD    PLAN;    pulmonary slow improvemients  increase iv solumedrols 60mg q6hr first dose stat  aerosols  supportive care  covid repeat testing-neg  d/w staff  dvt prophylasix PROBLEMS;    Ac on chronic hypercapnic & hypoxamic respiratory failure  OHS/VIJAY  AC flare of COPD/chronic vs acute on chronic bronchitis  Mild interstitial lung disease-NSIP h/o smoking  COVID negativex2  Pulmonary hypertension  Chronic afib w Watchman device  CHF  BPH  GERD    PLAN;    pulmonary slow improvemients  increase iv solumedrols 60mg q6hr first dose stat  iv azithromycin  aerosols  supportive care  covid repeat testing-neg  d/w staff  dvt prophylasix

## 2020-04-21 NOTE — PROGRESS NOTE ADULT - SUBJECTIVE AND OBJECTIVE BOX
Subjective:    pat more sob, & wheezy today, coughing episodes.    Home Medications:  albuterol 2.5 mg/3 mL (0.083%) inhalation solution: 3 milliliter(s) inhaled every 6 hours, As Needed -for shortness of breath and/or wheezing (2020 03:12)  gabapentin 400 mg oral capsule: 1 cap(s) orally 3 times a day (2020 03:12)  pantoprazole 40 mg oral granule, enteric coated: 40 milligram(s) orally once a day (2020 03:12)  Spiriva 18 mcg inhalation capsule: 1 cap(s) inhaled once a day (2020 03:12)  tamsulosin 0.4 mg oral capsule: 1 cap(s) orally once a day (at bedtime) (2020 03:12)  traZODone 50 mg oral tablet: 2 tab(s) orally once a day (at bedtime), As Needed (2020 03:12)    MEDICATIONS  (STANDING):  acetaZOLAMIDE    Tablet 125 milliGRAM(s) Oral daily  ALBUTerol    90 MICROgram(s) HFA Inhaler 2 Puff(s) Inhalation every 4 hours  aspirin enteric coated 81 milliGRAM(s) Oral daily  azithromycin  IVPB      azithromycin  IVPB 500 milliGRAM(s) IV Intermittent every 24 hours  budesonide 160 MICROgram(s)/formoterol 4.5 MICROgram(s) Inhaler 2 Puff(s) Inhalation two times a day  clopidogrel Tablet 75 milliGRAM(s) Oral daily  diltiazem    milliGRAM(s) Oral daily  enoxaparin Injectable 40 milliGRAM(s) SubCutaneous every 12 hours  furosemide    Tablet 40 milliGRAM(s) Oral two times a day  gabapentin 400 milliGRAM(s) Oral three times a day  methylPREDNISolone sodium succinate Injectable 40 milliGRAM(s) IV Push every 8 hours  pantoprazole    Tablet 40 milliGRAM(s) Oral before breakfast  tamsulosin 0.4 milliGRAM(s) Oral at bedtime  tiotropium 18 MICROgram(s) Capsule 1 Capsule(s) Inhalation daily    MEDICATIONS  (PRN):  acetaminophen   Tablet .. 650 milliGRAM(s) Oral every 6 hours PRN Temp greater or equal to 38C (100.4F), Mild Pain (1 - 3)  ALPRAZolam 0.25 milliGRAM(s) Oral every 12 hours PRN anxiety  aluminum hydroxide/magnesium hydroxide/simethicone Suspension 30 milliLiter(s) Oral every 4 hours PRN Dyspepsia  traZODone 100 milliGRAM(s) Oral at bedtime PRN insomnia      Allergies    Ceclor (Rash)  Duricef (Rash)    Intolerances        Vital Signs Last 24 Hrs  T(C): 36.6 (2020 11:10), Max: 36.7 (2020 20:49)  T(F): 97.8 (2020 11:10), Max: 98.1 (2020 20:49)  HR: 109 (2020 11:10) (73 - 109)  BP: 120/61 (2020 11:10) (120/61 - 131/67)  BP(mean): --  RR: 20 (2020 11:10) (17 - 20)  SpO2: 93% (2020 11:10) (93% - 97%)      PHYSICAL EXAMINATION:    NECK:  Supple. No lymphadenopathy. Jugular venous pressure not elevated. Carotids equal.   HEART:   The cardiac impulse has a normal quality. Reg., Nl S1 and S2.  There are no murmurs, rubs or gallops noted  CHEST:  Chest rhonchi to auscultation. Normal respiratory effort.  ABDOMEN:  Soft and nontender.   EXTREMITIES:  There is no edema.       LABS:        135  |  97  |  21  ----------------------------<  125<H>  3.3<L>   |  33<H>  |  0.85    Ca    8.8      2020 07:22        Urinalysis Basic - ( 2020 06:01 )    Color: Yellow / Appearance: Clear / S.005 / pH: x  Gluc: x / Ketone: Negative  / Bili: Negative / Urobili: Negative mg/dL   Blood: x / Protein: Negative mg/dL / Nitrite: Negative   Leuk Esterase: Negative / RBC: >50 /HPF / WBC 0-2   Sq Epi: x / Non Sq Epi: Negative / Bacteria: Negative

## 2020-04-21 NOTE — CONSULT NOTE ADULT - SUBJECTIVE AND OBJECTIVE BOX
CHIEF COMPLAINT:Hematuria today    HISTORY OF PRESENT ILLNESS:Above, no voiding issues    PAST MEDICAL & SURGICAL HISTORY:  Chronic CHF  COPD without exacerbation  Atrial fibrillation  Presence of Watchman left atrial appendage closure device  Hypertension  GERD (gastroesophageal reflux disease)  Chronic obstructive pulmonary disease (COPD)  Anemia  Falls  Meningitis  Collapsed lung  Alcohol withdrawal  Emphysema of lung  Cirrhosis  CHF (congestive heart failure)  Poor historian  Alcohol abuse  Chronic atrial fibrillation  Unilateral amputation of lower extremity below knee  Presence of Watchman left atrial appendage closure device  S/P BKA (below knee amputation) unilateral, left      REVIEW OF SYSTEMS:    CONSTITUTIONAL: No weakness, fevers or chills  EYES/ENT: No visual changes;  No vertigo or throat pain   NECK: No pain or stiffness  RESPIRATORY: No cough, wheezing, hemoptysis; No shortness of breath  CARDIOVASCULAR: No chest pain or palpitations  GASTROINTESTINAL: No abdominal or epigastric pain. No nausea, vomiting, or hematemesis; No diarrhea or constipation. No melena or hematochezia.  GENITOURINARY: No dysuria, frequency or hematuria  NEUROLOGICAL: No numbness or weakness  SKIN: No itching, burning, rashes, or lesions   All other review of systems is negative unless indicated above.    MEDICATIONS  (STANDING):  acetaZOLAMIDE    Tablet 125 milliGRAM(s) Oral daily  ALBUTerol    90 MICROgram(s) HFA Inhaler 2 Puff(s) Inhalation every 4 hours  aspirin enteric coated 81 milliGRAM(s) Oral daily  azithromycin  IVPB      azithromycin  IVPB 500 milliGRAM(s) IV Intermittent every 24 hours  budesonide 160 MICROgram(s)/formoterol 4.5 MICROgram(s) Inhaler 2 Puff(s) Inhalation two times a day  clopidogrel Tablet 75 milliGRAM(s) Oral daily  diltiazem    milliGRAM(s) Oral daily  enoxaparin Injectable 40 milliGRAM(s) SubCutaneous every 12 hours  furosemide    Tablet 40 milliGRAM(s) Oral two times a day  gabapentin 400 milliGRAM(s) Oral three times a day  methylPREDNISolone sodium succinate Injectable 60 milliGRAM(s) IV Push every 6 hours  pantoprazole    Tablet 40 milliGRAM(s) Oral before breakfast  tamsulosin 0.4 milliGRAM(s) Oral at bedtime  tiotropium 18 MICROgram(s) Capsule 1 Capsule(s) Inhalation daily    MEDICATIONS  (PRN):  acetaminophen   Tablet .. 650 milliGRAM(s) Oral every 6 hours PRN Temp greater or equal to 38C (100.4F), Mild Pain (1 - 3)  ALPRAZolam 0.25 milliGRAM(s) Oral every 12 hours PRN anxiety  aluminum hydroxide/magnesium hydroxide/simethicone Suspension 30 milliLiter(s) Oral every 4 hours PRN Dyspepsia  traZODone 100 milliGRAM(s) Oral at bedtime PRN insomnia      Allergies    Ceclor (Rash)  Duricef (Rash)    Intolerances        SOCIAL HISTORY:    FAMILY HISTORY:  No pertinent family history in first degree relatives  Family history unknown: Adopted      Vital Signs Last 24 Hrs  T(C): 36.7 (2020 16:22), Max: 36.7 (2020 20:49)  T(F): 98.1 (2020 16:22), Max: 98.1 (2020 20:49)  HR: 103 (2020 16:22) (99 - 109)  BP: 146/61 (2020 16:22) (120/61 - 146/61)  BP(mean): --  RR: 19 (2020 16:22) (19 - 20)  SpO2: 93% (2020 16:22) (93% - 97%)    PHYSICAL EXAM:    Constitutional: NAD, well-developed  HEENT: PRATIMA, EOMI, Normal Hearing, MMM  Neck: No LAD, No JVD  Back: Normal spine flexure, No CVA tenderness  Respiratory: CTAB   Cardiovascular: S1 and S2, RRR, no M/G/R  Abd: BS+, soft, NT/ND, No CVAT  : Normal phallus,open meatus,bilateral descended testes, no masses  TRACEY: Normal prostate, no masses  Extremities: No peripheral edema  Vascular: 2+ peripheral pulses  Neurological: A/O x 3, no focal deficits  Psychiatric: Normal mood, normal affect  Musculoskeletal: 5/5 strength b/l upper and lower extremities  Skin: No rashes    LABS:                        14.5   9.51  )-----------( 129      ( 2020 07:22 )             45.2     04-21    135  |  97  |    ----------------------------<  125<H>  3.3<L>   |  33<H>  |  0.85    Ca    8.8      2020 07:22        Urinalysis Basic - ( 2020 06:01 )    Color: Yellow / Appearance: Clear / S.005 / pH: x  Gluc: x / Ketone: Negative  / Bili: Negative / Urobili: Negative mg/dL   Blood: x / Protein: Negative mg/dL / Nitrite: Negative   Leuk Esterase: Negative / RBC: >50 /HPF / WBC 0-2   Sq Epi: x / Non Sq Epi: Negative / Bacteria: Negative      Urine Culture:     RADIOLOGY & ADDITIONAL STUDIES:

## 2020-04-21 NOTE — PROGRESS NOTE ADULT - PROBLEM SELECTOR PLAN 2
as  above , now pre renal azotemic , consider decreasing Diuretic dose , echo for follow up Mitral regurgitation

## 2020-04-21 NOTE — PROGRESS NOTE ADULT - ASSESSMENT
66yo obese Male w/ PMH Chronic AFib (s/p Watchman device), CHF, COPD and HTN whom presented to the ED via EMS w/ c/o SOB since the morning of 04-18.  Pt found to have acute on chronic respiratory failure due to COPD exacerbation vs CHF exacerbation in accordance with Rapid A-Fib    #Acute on chronic hypercapnic + hypoxemic respiratory failure:  2/2 presumed COPD/Cute on Chronic CHF exacerbation.   COVID negative x2.  CT chest negative for PNA or signs of COVID. D-Dimer <150  Pt now on supplemental O2 via NC @ 4L (Pt is s/p NRB and BIPAP on admission)  Slowly improving. Being followed by both cardio & pulm.   - C/w Supplemental O2 via NC w/ goal O2% >92%    - C/w Solumedrol 40mg IV Q8hrs. Not ready to titrate at this time.   - C/w Spiriva 18mg, Advair BID & Albuterol MDI q4 hours  - C/w Azithromycin for total 5day course given COPD exacerbation  - Pulm consulted. Continued recs appreciated    #Acute on Chronic Systolic CHF exacerbation:    Improving. Cardio following. Lasix reduced to 40mg PO BID from 40mg IV given pre-renal azotemia.   - C/w Lasix 40mg POBID as per cardio   - C/w home Acetazolamide  - Daily weights  - C/w ASA 81 mg & Plavix 75 mg  - Continued cardio recs appreciated    # Hematuria: No gross bleeding noted. Denies dysuria or increased frequency. This was noted on admission. VS WNL  - F/u Uro consult  - F/u H&H in AM   - If worsening can consider holding DAPT    #Hypokalemia:   Replete and recheck.      #Persistent afib with uncontrolled rate on admission:    Improved after IV diltiazem. Now rate controlled.   - C/w home diltiazem  mg daily  - C/w tele monitoring for now.    - Continued Cardio recs appreciated    #VIJAY  - Not compliant with CPAP @ home    #BPH  - c/w Flomax 0.4 mg    #GERD  - c/w Protonix 40 mg    #Preventive Measures  - VTE: Lovenox 66yo obese Male w/ PMH Chronic AFib (s/p Watchman device), CHF, COPD and HTN whom presented to the ED via EMS w/ c/o SOB since the morning of 04-18.  Pt found to have acute on chronic respiratory failure due to COPD exacerbation vs CHF exacerbation in accordance with Rapid A-Fib    #Acute on chronic hypercapnic + hypoxemic respiratory failure:  2/2 presumed COPD/Cute on Chronic CHF exacerbation.   COVID negative x2.  CT chest negative for PNA or signs of COVID. D-Dimer <150  Pt now on supplemental O2 via NC @ 4L (Pt is s/p NRB and BIPAP on admission)  Slowly improving. Being followed by both cardio & pulm.   - C/w Supplemental O2 via NC w/ goal O2% >92%    - Per Pulm, Increase IV Solumedrol from 40mg IV Q8hrs to 60mg Q6.   - C/w Spiriva 18mg, Advair BID & Albuterol MDI q4 hours  - C/w Azithromycin for total 5day course given COPD exacerbation  - Pulm consulted. Continued recs appreciated    #Acute on Chronic Systolic CHF exacerbation:    Improving. Cardio following. Lasix reduced to 40mg PO BID from 40mg IV given pre-renal azotemia.   - C/w Lasix 40mg POBID as per cardio   - C/w home Acetazolamide  - Daily weights  - C/w ASA 81 mg & Plavix 75 mg  - Continued cardio recs appreciated    # Hematuria: No gross bleeding noted. Denies dysuria or increased frequency. This was noted on admission. VS WNL  - F/u Uro consult  - F/u H&H in AM   - If worsening can consider holding DAPT    #Hypokalemia:   Replete and recheck.      #Persistent afib with uncontrolled rate on admission:    Improved after IV diltiazem. Now rate controlled.   - C/w home diltiazem  mg daily  - C/w tele monitoring for now.    - Continued Cardio recs appreciated    #VIJAY  - Not compliant with CPAP @ home    #BPH  - c/w Flomax 0.4 mg    #GERD  - c/w Protonix 40 mg    #Preventive Measures  - VTE: Lovenox

## 2020-04-22 DIAGNOSIS — J96.21 ACUTE AND CHRONIC RESPIRATORY FAILURE WITH HYPOXIA: ICD-10-CM

## 2020-04-22 LAB
ALBUMIN SERPL ELPH-MCNC: 3.2 G/DL — LOW (ref 3.3–5)
ALP SERPL-CCNC: 95 U/L — SIGNIFICANT CHANGE UP (ref 40–120)
ALT FLD-CCNC: 55 U/L — SIGNIFICANT CHANGE UP (ref 12–78)
ANION GAP SERPL CALC-SCNC: 3 MMOL/L — LOW (ref 5–17)
AST SERPL-CCNC: 38 U/L — HIGH (ref 15–37)
BILIRUB SERPL-MCNC: 0.6 MG/DL — SIGNIFICANT CHANGE UP (ref 0.2–1.2)
BUN SERPL-MCNC: 27 MG/DL — HIGH (ref 7–23)
CALCIUM SERPL-MCNC: 8.6 MG/DL — SIGNIFICANT CHANGE UP (ref 8.5–10.1)
CHLORIDE SERPL-SCNC: 101 MMOL/L — SIGNIFICANT CHANGE UP (ref 96–108)
CO2 SERPL-SCNC: 33 MMOL/L — HIGH (ref 22–31)
CREAT SERPL-MCNC: 0.87 MG/DL — SIGNIFICANT CHANGE UP (ref 0.5–1.3)
CULTURE RESULTS: SIGNIFICANT CHANGE UP
GLUCOSE SERPL-MCNC: 124 MG/DL — HIGH (ref 70–99)
HCT VFR BLD CALC: 44.5 % — SIGNIFICANT CHANGE UP (ref 39–50)
HGB BLD-MCNC: 14.3 G/DL — SIGNIFICANT CHANGE UP (ref 13–17)
MCHC RBC-ENTMCNC: 32.1 GM/DL — SIGNIFICANT CHANGE UP (ref 32–36)
MCHC RBC-ENTMCNC: 32.1 PG — SIGNIFICANT CHANGE UP (ref 27–34)
MCV RBC AUTO: 100 FL — SIGNIFICANT CHANGE UP (ref 80–100)
PLATELET # BLD AUTO: 129 K/UL — LOW (ref 150–400)
POTASSIUM SERPL-MCNC: 4.2 MMOL/L — SIGNIFICANT CHANGE UP (ref 3.5–5.3)
POTASSIUM SERPL-SCNC: 4.2 MMOL/L — SIGNIFICANT CHANGE UP (ref 3.5–5.3)
PROT SERPL-MCNC: 7 GM/DL — SIGNIFICANT CHANGE UP (ref 6–8.3)
RBC # BLD: 4.45 M/UL — SIGNIFICANT CHANGE UP (ref 4.2–5.8)
RBC # FLD: 13.2 % — SIGNIFICANT CHANGE UP (ref 10.3–14.5)
SODIUM SERPL-SCNC: 137 MMOL/L — SIGNIFICANT CHANGE UP (ref 135–145)
SPECIMEN SOURCE: SIGNIFICANT CHANGE UP
WBC # BLD: 8.39 K/UL — SIGNIFICANT CHANGE UP (ref 3.8–10.5)
WBC # FLD AUTO: 8.39 K/UL — SIGNIFICANT CHANGE UP (ref 3.8–10.5)

## 2020-04-22 PROCEDURE — 74178 CT ABD&PLV WO CNTR FLWD CNTR: CPT | Mod: 26

## 2020-04-22 PROCEDURE — 99232 SBSQ HOSP IP/OBS MODERATE 35: CPT

## 2020-04-22 PROCEDURE — 76857 US EXAM PELVIC LIMITED: CPT | Mod: 26

## 2020-04-22 PROCEDURE — 70450 CT HEAD/BRAIN W/O DYE: CPT | Mod: 26,59

## 2020-04-22 PROCEDURE — 70496 CT ANGIOGRAPHY HEAD: CPT | Mod: 26

## 2020-04-22 PROCEDURE — 70498 CT ANGIOGRAPHY NECK: CPT | Mod: 26

## 2020-04-22 PROCEDURE — 99233 SBSQ HOSP IP/OBS HIGH 50: CPT

## 2020-04-22 PROCEDURE — 99223 1ST HOSP IP/OBS HIGH 75: CPT

## 2020-04-22 RX ORDER — DILTIAZEM HCL 120 MG
240 CAPSULE, EXT RELEASE 24 HR ORAL DAILY
Refills: 0 | Status: DISCONTINUED | OUTPATIENT
Start: 2020-04-23 | End: 2020-04-23

## 2020-04-22 RX ORDER — FUROSEMIDE 40 MG
40 TABLET ORAL DAILY
Refills: 0 | Status: DISCONTINUED | OUTPATIENT
Start: 2020-04-22 | End: 2020-04-23

## 2020-04-22 RX ADMIN — Medication 60 MILLIGRAM(S): at 11:02

## 2020-04-22 RX ADMIN — ENOXAPARIN SODIUM 40 MILLIGRAM(S): 100 INJECTION SUBCUTANEOUS at 11:01

## 2020-04-22 RX ADMIN — ACETAZOLAMIDE 125 MILLIGRAM(S): 250 TABLET ORAL at 11:02

## 2020-04-22 RX ADMIN — ALBUTEROL 2 PUFF(S): 90 AEROSOL, METERED ORAL at 10:36

## 2020-04-22 RX ADMIN — ENOXAPARIN SODIUM 40 MILLIGRAM(S): 100 INJECTION SUBCUTANEOUS at 22:02

## 2020-04-22 RX ADMIN — GABAPENTIN 400 MILLIGRAM(S): 400 CAPSULE ORAL at 13:44

## 2020-04-22 RX ADMIN — Medication 40 MILLIGRAM(S): at 17:10

## 2020-04-22 RX ADMIN — PANTOPRAZOLE SODIUM 40 MILLIGRAM(S): 20 TABLET, DELAYED RELEASE ORAL at 04:59

## 2020-04-22 RX ADMIN — GABAPENTIN 400 MILLIGRAM(S): 400 CAPSULE ORAL at 22:02

## 2020-04-22 RX ADMIN — Medication 60 MILLIGRAM(S): at 22:02

## 2020-04-22 RX ADMIN — Medication 2 MILLIGRAM(S): at 23:39

## 2020-04-22 RX ADMIN — CLOPIDOGREL BISULFATE 75 MILLIGRAM(S): 75 TABLET, FILM COATED ORAL at 11:04

## 2020-04-22 RX ADMIN — TIOTROPIUM BROMIDE 1 CAPSULE(S): 18 CAPSULE ORAL; RESPIRATORY (INHALATION) at 11:05

## 2020-04-22 RX ADMIN — Medication 81 MILLIGRAM(S): at 11:03

## 2020-04-22 RX ADMIN — Medication 180 MILLIGRAM(S): at 04:58

## 2020-04-22 RX ADMIN — Medication 60 MILLIGRAM(S): at 04:59

## 2020-04-22 RX ADMIN — GABAPENTIN 400 MILLIGRAM(S): 400 CAPSULE ORAL at 04:58

## 2020-04-22 RX ADMIN — TAMSULOSIN HYDROCHLORIDE 0.4 MILLIGRAM(S): 0.4 CAPSULE ORAL at 22:02

## 2020-04-22 RX ADMIN — BUDESONIDE AND FORMOTEROL FUMARATE DIHYDRATE 2 PUFF(S): 160; 4.5 AEROSOL RESPIRATORY (INHALATION) at 10:36

## 2020-04-22 RX ADMIN — ALBUTEROL 2 PUFF(S): 90 AEROSOL, METERED ORAL at 15:01

## 2020-04-22 RX ADMIN — AZITHROMYCIN 255 MILLIGRAM(S): 500 TABLET, FILM COATED ORAL at 17:11

## 2020-04-22 RX ADMIN — Medication 60 MILLIGRAM(S): at 17:11

## 2020-04-22 RX ADMIN — Medication 40 MILLIGRAM(S): at 04:59

## 2020-04-22 NOTE — PROGRESS NOTE ADULT - SUBJECTIVE AND OBJECTIVE BOX
Patient is a 65y old  Male who presents with a chief complaint of acute hypoxemic, hypercapneic resp failure  COPD exacerbation (2020 08:12)    SUBJECTIVE / OVERNIGHT EVENTS:      Tele: Hr        MEDICATIONS  (STANDING):  acetaZOLAMIDE    Tablet 125 milliGRAM(s) Oral daily  ALBUTerol    90 MICROgram(s) HFA Inhaler 2 Puff(s) Inhalation every 4 hours  aspirin enteric coated 81 milliGRAM(s) Oral daily  azithromycin  IVPB      azithromycin  IVPB 500 milliGRAM(s) IV Intermittent every 24 hours  budesonide 160 MICROgram(s)/formoterol 4.5 MICROgram(s) Inhaler 2 Puff(s) Inhalation two times a day  clopidogrel Tablet 75 milliGRAM(s) Oral daily  enoxaparin Injectable 40 milliGRAM(s) SubCutaneous every 12 hours  furosemide    Tablet 40 milliGRAM(s) Oral two times a day  gabapentin 400 milliGRAM(s) Oral three times a day  methylPREDNISolone sodium succinate Injectable 60 milliGRAM(s) IV Push every 6 hours  pantoprazole    Tablet 40 milliGRAM(s) Oral before breakfast  tamsulosin 0.4 milliGRAM(s) Oral at bedtime  tiotropium 18 MICROgram(s) Capsule 1 Capsule(s) Inhalation daily    MEDICATIONS  (PRN):  acetaminophen   Tablet .. 650 milliGRAM(s) Oral every 6 hours PRN Temp greater or equal to 38C (100.4F), Mild Pain (1 - 3)  ALPRAZolam 0.25 milliGRAM(s) Oral every 12 hours PRN anxiety  aluminum hydroxide/magnesium hydroxide/simethicone Suspension 30 milliLiter(s) Oral every 4 hours PRN Dyspepsia  traZODone 100 milliGRAM(s) Oral at bedtime PRN insomnia    Vital Signs Last 24 Hrs  T(C): 36.3 (2020 05:00), Max: 36.7 (2020 16:22)  T(F): 97.4 (2020 05:00), Max: 98.1 (2020 16:22)  HR: 100 (2020 05:00) (100 - 109)  BP: 135/71 (2020 05:00) (120/61 - 146/61)  RR: 20 (2020 05:00) (19 - 20)  SpO2: 93% on 4L NC (2020 05:00) (93% - 93%)      04- @ 07:01  -   @ 07:00  --------------------------------------------------------  IN: 0 mL / OUT: 1400 mL / NET: -1400 mL    PHYSICAL EXAM  GENERAL: NAD, well-developed  HEAD:  Atraumatic, Normocephalic  EYES: EOMI, PERRLA, conjunctiva and sclera clear  NECK: Supple, No JVD  CHEST/LUNG: Diffuse expiratory wheezing. NRD. No use of accessory muscles. No Rhonchi. 4L O2 via NC sating in the 90's  HEART: Regular rate and rhythm; No murmurs, rubs, or gallops  ABDOMEN: Soft, Nontender, Nondistended; Bowel sounds present  EXTREMITIES:  2+ Peripheral Pulses, No clubbing, cyanosis, or edema  PSYCH: AAOx3  SKIN: No rashes or lesions      LABS:     Labs Pending    Urinalysis Basic - ( 2020 06:01 )  Color: Yellow / Appearance: Clear / S.005 / pH: x  Gluc: x / Ketone: Negative  / Bili: Negative / Urobili: Negative mg/dL   Blood: x / Protein: Negative mg/dL / Nitrite: Negative   Leuk Esterase: Negative / RBC: >50 /HPF / WBC 0-2   Sq Epi: x / Non Sq Epi: Negative / Bacteria: Negative    Culture - Blood (collected 2020 19:43)  Source: .Blood Blood-Peripheral  Preliminary Report (2020 01:01):    No growth to date. Patient is a 65y old  Male who presents with a chief complaint of acute hypoxemic, hypercapneic resp failure  COPD exacerbation (2020 08:12)    SUBJECTIVE / OVERNIGHT EVENTS: Patient states he continues to have difficulty breathing and thinks that current rate of supplemental O2 is not enough. Coughs multiple times through every sentence. Complains of some flank and suprapubic soreness after US.     Tele: Hr      MEDICATIONS  (STANDING):  acetaZOLAMIDE    Tablet 125 milliGRAM(s) Oral daily  ALBUTerol    90 MICROgram(s) HFA Inhaler 2 Puff(s) Inhalation every 4 hours  aspirin enteric coated 81 milliGRAM(s) Oral daily  azithromycin  IVPB      azithromycin  IVPB 500 milliGRAM(s) IV Intermittent every 24 hours  budesonide 160 MICROgram(s)/formoterol 4.5 MICROgram(s) Inhaler 2 Puff(s) Inhalation two times a day  clopidogrel Tablet 75 milliGRAM(s) Oral daily  enoxaparin Injectable 40 milliGRAM(s) SubCutaneous every 12 hours  furosemide    Tablet 40 milliGRAM(s) Oral two times a day  gabapentin 400 milliGRAM(s) Oral three times a day  methylPREDNISolone sodium succinate Injectable 60 milliGRAM(s) IV Push every 6 hours  pantoprazole    Tablet 40 milliGRAM(s) Oral before breakfast  tamsulosin 0.4 milliGRAM(s) Oral at bedtime  tiotropium 18 MICROgram(s) Capsule 1 Capsule(s) Inhalation daily    MEDICATIONS  (PRN):  acetaminophen   Tablet .. 650 milliGRAM(s) Oral every 6 hours PRN Temp greater or equal to 38C (100.4F), Mild Pain (1 - 3)  ALPRAZolam 0.25 milliGRAM(s) Oral every 12 hours PRN anxiety  aluminum hydroxide/magnesium hydroxide/simethicone Suspension 30 milliLiter(s) Oral every 4 hours PRN Dyspepsia  traZODone 100 milliGRAM(s) Oral at bedtime PRN insomnia    Vital Signs Last 24 Hrs  T(C): 36.3 (2020 05:00), Max: 36.7 (2020 16:22)  T(F): 97.4 (2020 05:00), Max: 98.1 (2020 16:22)  HR: 100 (2020 05:00) (100 - 109)  BP: 135/71 (2020 05:00) (120/61 - 146/61)  RR: 20 (2020 05:00) (19 - 20)  SpO2: 93% on 4L NC (2020 05:00) (93% - 93%)       @ 07:01  -   @ 07:00  --------------------------------------------------------  IN: 0 mL / OUT: 1400 mL / NET: -1400 mL    PHYSICAL EXAM  GENERAL: NAD, well-developed  HEAD:  Atraumatic, Normocephalic  EYES: EOMI, PERRLA, conjunctiva and sclera clear  NECK: Supple, No JVD  CHEST/LUNG: Diffuse expiratory wheezing. NRD. No use of accessory muscles. No Rhonchi. 4L O2 via NC sating in the 90's  HEART: Regular rate and rhythm; No murmurs, rubs, or gallops  ABDOMEN: Soft, Nontender, Nondistended; Bowel sounds present  EXTREMITIES:  2+ Peripheral Pulses, No clubbing, cyanosis, or edema  PSYCH: AAOx3  SKIN: No rashes or lesions      LABS:             14.3   8.39  )-----------( 129      (  @ 08:05 )             44.5      @ 08:05    137  |  101  |  27  ----------------------------<  124  4.2   |  33  |  0.87    Ca    8.6       @ 08:05    TPro  7.0  /  Alb  3.2  /  TBili  0.6  /  DBili  x   /  AST  38  /  ALT  55  /  AlkPhos  95   @ 08:05      Urinalysis Basic - ( 2020 06:01 )  Color: Yellow / Appearance: Clear / S.005 / pH: x  Gluc: x / Ketone: Negative  / Bili: Negative / Urobili: Negative mg/dL   Blood: x / Protein: Negative mg/dL / Nitrite: Negative   Leuk Esterase: Negative / RBC: >50 /HPF / WBC 0-2   Sq Epi: x / Non Sq Epi: Negative / Bacteria: Negative    Culture - Blood (collected 2020 19:43)  Source: .Blood Blood-Peripheral  Preliminary Report (2020 01:01):    No growth to date. Patient is a 65y old  Male who presents with a chief complaint of acute hypoxemic, hypercapneic resp failure  COPD exacerbation (2020 08:12)    SUBJECTIVE / OVERNIGHT EVENTS: Patient states he continues to have difficulty breathing and thinks that current rate of supplemental O2 is not enough. Coughs multiple times through every sentence. Complains of some flank and suprapubic soreness after US.     Tele: Afib 90s-100; occasionally into unsustained 120s    MEDICATIONS  (STANDING):  acetaZOLAMIDE    Tablet 125 milliGRAM(s) Oral daily  ALBUTerol    90 MICROgram(s) HFA Inhaler 2 Puff(s) Inhalation every 4 hours  aspirin enteric coated 81 milliGRAM(s) Oral daily  azithromycin  IVPB      azithromycin  IVPB 500 milliGRAM(s) IV Intermittent every 24 hours  budesonide 160 MICROgram(s)/formoterol 4.5 MICROgram(s) Inhaler 2 Puff(s) Inhalation two times a day  clopidogrel Tablet 75 milliGRAM(s) Oral daily  enoxaparin Injectable 40 milliGRAM(s) SubCutaneous every 12 hours  furosemide    Tablet 40 milliGRAM(s) Oral two times a day  gabapentin 400 milliGRAM(s) Oral three times a day  methylPREDNISolone sodium succinate Injectable 60 milliGRAM(s) IV Push every 6 hours  pantoprazole    Tablet 40 milliGRAM(s) Oral before breakfast  tamsulosin 0.4 milliGRAM(s) Oral at bedtime  tiotropium 18 MICROgram(s) Capsule 1 Capsule(s) Inhalation daily    MEDICATIONS  (PRN):  acetaminophen   Tablet .. 650 milliGRAM(s) Oral every 6 hours PRN Temp greater or equal to 38C (100.4F), Mild Pain (1 - 3)  ALPRAZolam 0.25 milliGRAM(s) Oral every 12 hours PRN anxiety  aluminum hydroxide/magnesium hydroxide/simethicone Suspension 30 milliLiter(s) Oral every 4 hours PRN Dyspepsia  traZODone 100 milliGRAM(s) Oral at bedtime PRN insomnia    Vital Signs Last 24 Hrs  T(C): 36.3 (2020 05:00), Max: 36.7 (2020 16:22)  T(F): 97.4 (2020 05:00), Max: 98.1 (2020 16:22)  HR: 100 (2020 05:00) (100 - 109)  BP: 135/71 (2020 05:00) (120/61 - 146/61)  RR: 20 (2020 05:00) (19 - 20)  SpO2: 93% on 4L NC (2020 05:00) (93% - 93%)       @ 07:01  -   @ 07:00  --------------------------------------------------------  IN: 0 mL / OUT: 1400 mL / NET: -1400 mL    PHYSICAL EXAM  GENERAL: NAD, well-developed  HEAD:  Atraumatic, Normocephalic  EYES: EOMI, PERRLA, conjunctiva and sclera clear  NECK: Supple, No JVD  CHEST/LUNG: Diffuse expiratory wheezing. NRD. No use of accessory muscles. No Rhonchi. 4L O2 via NC sating in the 90's  HEART: Regular rate and rhythm; No murmurs, rubs, or gallops  ABDOMEN: Soft, Nontender, Nondistended; Bowel sounds present  EXTREMITIES:  2+ Peripheral Pulses, No clubbing, cyanosis, or edema  PSYCH: AAOx3  SKIN: No rashes or lesions      LABS:             14.3   8.39  )-----------( 129      (  @ 08:05 )             44.5      @ 08:05    137  |  101  |  27  ----------------------------<  124  4.2   |  33  |  0.87    Ca    8.6       @ 08:05    TPro  7.0  /  Alb  3.2  /  TBili  0.6  /  DBili  x   /  AST  38  /  ALT  55  /  AlkPhos  95   @ 08:05      Urinalysis Basic - ( 2020 06:01 )  Color: Yellow / Appearance: Clear / S.005 / pH: x  Gluc: x / Ketone: Negative  / Bili: Negative / Urobili: Negative mg/dL   Blood: x / Protein: Negative mg/dL / Nitrite: Negative   Leuk Esterase: Negative / RBC: >50 /HPF / WBC 0-2   Sq Epi: x / Non Sq Epi: Negative / Bacteria: Negative    Culture - Blood (collected 2020 19:43)  Source: .Blood Blood-Peripheral  Preliminary Report (2020 01:01):    No growth to date. Patient is a 65y old  Male who presents with a chief complaint of acute hypoxemic, hypercapneic resp failure  COPD exacerbation (2020 08:12)    SUBJECTIVE / OVERNIGHT EVENTS: No acute events overnight. Pt seen at bedside. He states he's continuing to have difficulty breathing and he does not think his supplemental O2 is adequate. However, at time of interview, he had just returned from transport. Complains of some flank and suprapubic soreness after US. Denies CP, abdominal pain, or dysuria.     Tele: Afib 90s-100; occasionally into unsustained 120s    MEDICATIONS  (STANDING):  acetaZOLAMIDE    Tablet 125 milliGRAM(s) Oral daily  ALBUTerol    90 MICROgram(s) HFA Inhaler 2 Puff(s) Inhalation every 4 hours  aspirin enteric coated 81 milliGRAM(s) Oral daily  azithromycin  IVPB      azithromycin  IVPB 500 milliGRAM(s) IV Intermittent every 24 hours  budesonide 160 MICROgram(s)/formoterol 4.5 MICROgram(s) Inhaler 2 Puff(s) Inhalation two times a day  clopidogrel Tablet 75 milliGRAM(s) Oral daily  enoxaparin Injectable 40 milliGRAM(s) SubCutaneous every 12 hours  furosemide    Tablet 40 milliGRAM(s) Oral two times a day  gabapentin 400 milliGRAM(s) Oral three times a day  methylPREDNISolone sodium succinate Injectable 60 milliGRAM(s) IV Push every 6 hours  pantoprazole    Tablet 40 milliGRAM(s) Oral before breakfast  tamsulosin 0.4 milliGRAM(s) Oral at bedtime  tiotropium 18 MICROgram(s) Capsule 1 Capsule(s) Inhalation daily    MEDICATIONS  (PRN):  acetaminophen   Tablet .. 650 milliGRAM(s) Oral every 6 hours PRN Temp greater or equal to 38C (100.4F), Mild Pain (1 - 3)  ALPRAZolam 0.25 milliGRAM(s) Oral every 12 hours PRN anxiety  aluminum hydroxide/magnesium hydroxide/simethicone Suspension 30 milliLiter(s) Oral every 4 hours PRN Dyspepsia  traZODone 100 milliGRAM(s) Oral at bedtime PRN insomnia    Vital Signs Last 24 Hrs  T(C): 36.3 (2020 05:00), Max: 36.7 (2020 16:22)  T(F): 97.4 (2020 05:00), Max: 98.1 (2020 16:22)  HR: 100 (2020 05:00) (100 - 109)  BP: 135/71 (2020 05:00) (120/61 - 146/61)  RR: 20 (2020 05:00) (19 - 20)  SpO2: 93% on 4L NC (2020 05:00) (93% - 93%)       @ 07:01  -   @ 07:00  --------------------------------------------------------  IN: 0 mL / OUT: 1400 mL / NET: -1400 mL    PHYSICAL EXAM  GENERAL: NAD, well-developed  HEAD:  Atraumatic, Normocephalic  EYES: EOMI, PERRLA, conjunctiva and sclera clear  NECK: Supple, No JVD  CHEST/LUNG: Diffuse expiratory wheezing. NRD. No use of accessory muscles. No Rhonchi. 4L O2 via NC sating in the 90's  HEART: Regular rate and rhythm; No murmurs, rubs, or gallops  ABDOMEN: Soft, Nontender, Nondistended; Bowel sounds present  EXTREMITIES:  2+ Peripheral Pulses, No clubbing, cyanosis, or edema  PSYCH: AAOx3  SKIN: No rashes or lesions      LABS:             14.3   8.39  )-----------( 129      (  @ 08:05 )             44.5      @ 08:05    137  |  101  |  27  ----------------------------<  124  4.2   |  33  |  0.87    Ca    8.6       @ 08:05    TPro  7.0  /  Alb  3.2  /  TBili  0.6  /  DBili  x   /  AST  38  /  ALT  55  /  AlkPhos  95   @ 08:05      Urinalysis Basic - ( 2020 06:01 )  Color: Yellow / Appearance: Clear / S.005 / pH: x  Gluc: x / Ketone: Negative  / Bili: Negative / Urobili: Negative mg/dL   Blood: x / Protein: Negative mg/dL / Nitrite: Negative   Leuk Esterase: Negative / RBC: >50 /HPF / WBC 0-2   Sq Epi: x / Non Sq Epi: Negative / Bacteria: Negative    Culture - Blood (collected 2020 19:43)  Source: .Blood Blood-Peripheral  Preliminary Report (2020 01:01):    No growth to date. Patient is a 65y old  Male who presents with a chief complaint of acute hypoxemic, hypercapneic resp failure  COPD exacerbation (2020 08:12)    SUBJECTIVE / OVERNIGHT EVENTS: No acute events overnight. Pt seen at bedside. He states continues to have difficulty breathing, although better when compared with yesterday. Sometimes skips rescue inhaler doses because pt does not feel like he needs it. Complains of flank and suprapubic soreness, although patient just returned from US. Denies CP, abdominal pain, or dysuria.     Tele: Afib 90s-100; occasionally into unsustained 120s    MEDICATIONS  (STANDING):  acetaZOLAMIDE    Tablet 125 milliGRAM(s) Oral daily  ALBUTerol    90 MICROgram(s) HFA Inhaler 2 Puff(s) Inhalation every 4 hours  aspirin enteric coated 81 milliGRAM(s) Oral daily  azithromycin  IVPB      azithromycin  IVPB 500 milliGRAM(s) IV Intermittent every 24 hours  budesonide 160 MICROgram(s)/formoterol 4.5 MICROgram(s) Inhaler 2 Puff(s) Inhalation two times a day  clopidogrel Tablet 75 milliGRAM(s) Oral daily  enoxaparin Injectable 40 milliGRAM(s) SubCutaneous every 12 hours  furosemide    Tablet 40 milliGRAM(s) Oral two times a day  gabapentin 400 milliGRAM(s) Oral three times a day  methylPREDNISolone sodium succinate Injectable 60 milliGRAM(s) IV Push every 6 hours  pantoprazole    Tablet 40 milliGRAM(s) Oral before breakfast  tamsulosin 0.4 milliGRAM(s) Oral at bedtime  tiotropium 18 MICROgram(s) Capsule 1 Capsule(s) Inhalation daily    MEDICATIONS  (PRN):  acetaminophen   Tablet .. 650 milliGRAM(s) Oral every 6 hours PRN Temp greater or equal to 38C (100.4F), Mild Pain (1 - 3)  ALPRAZolam 0.25 milliGRAM(s) Oral every 12 hours PRN anxiety  aluminum hydroxide/magnesium hydroxide/simethicone Suspension 30 milliLiter(s) Oral every 4 hours PRN Dyspepsia  traZODone 100 milliGRAM(s) Oral at bedtime PRN insomnia    Vital Signs Last 24 Hrs  T(C): 36.3 (2020 05:00), Max: 36.7 (2020 16:22)  T(F): 97.4 (2020 05:00), Max: 98.1 (2020 16:22)  HR: 100 (2020 05:00) (100 - 109)  BP: 135/71 (2020 05:00) (120/61 - 146/61)  RR: 20 (2020 05:00) (19 - 20)  SpO2: 93% on 4L NC (2020 05:00) (93% - 93%)       @ 07:01  -   @ 07:00  --------------------------------------------------------  IN: 0 mL / OUT: 1400 mL / NET: -1400 mL    PHYSICAL EXAM  GENERAL: NAD, well-developed  HEAD:  Atraumatic, Normocephalic  EYES: EOMI, PERRLA, conjunctiva and sclera clear  NECK: Supple, No JVD  CHEST/LUNG: Diffuse expiratory wheezing. NRD. No use of accessory muscles. No Rhonchi. 4L O2 via NC sating in the 90's  HEART: Regular rate and rhythm; No murmurs, rubs, or gallops  ABDOMEN: Soft, Nontender, Nondistended; Bowel sounds present  EXTREMITIES:  2+ Peripheral Pulses, No clubbing, cyanosis, or edema  PSYCH: AAOx3  SKIN: No rashes or lesions      LABS:             14.3   8.39  )-----------( 129      (  @ 08:05 )             44.5      @ 08:05    137  |  101  |  27  ----------------------------<  124  4.2   |  33  |  0.87    Ca    8.6       @ 08:05    TPro  7.0  /  Alb  3.2  /  TBili  0.6  /  DBili  x   /  AST  38  /  ALT  55  /  AlkPhos  95   @ 08:05      Urinalysis Basic - ( 2020 06:01 )  Color: Yellow / Appearance: Clear / S.005 / pH: x  Gluc: x / Ketone: Negative  / Bili: Negative / Urobili: Negative mg/dL   Blood: x / Protein: Negative mg/dL / Nitrite: Negative   Leuk Esterase: Negative / RBC: >50 /HPF / WBC 0-2   Sq Epi: x / Non Sq Epi: Negative / Bacteria: Negative    Culture - Blood (collected 2020 19:43)  Source: .Blood Blood-Peripheral  Preliminary Report (2020 01:01):    No growth to date.    < from: CT Abdomen and Pelvis w/wo IV Cont (20 @ 09:09) >  FINDINGS:  ABDOMEN/PELVIS:  Lower Chest: Cardiomegaly. Coronary artery calcifications.  Liver: Cirrhotic morphology..  Gallbladder/Billary: Partially distended, shadowing calculus  Pancreas: Within normal limits  Spleen: Mildly enlarged. Calcified granulomata.  Adrenal Glands: Within normal limits  Kidneys: Symmetric size, enhancement and excretion of contrast. There is a nonobstructing 3 mm stone in the left ureterovesicular junction. Nonobstructing stone in the lower pole right kidney measures7 mm..  GI Tract: No bowel obstruction or abnormal distention. Normal appendix.  Mesentery/Peritoneum: No free air or ascites.  Vasculature: Dense calcified plaque in the abdominal aorta without aneurysm.  Lymph Nodes: No enlarged lymph node measuring greater than 10 mm in short axis  Abdominal Wall: Soft tissue density and thickening of the right rectus abdominis along the anterior sheath, from the level of the umbilicus to the inferior myotendinous junction, suspicious for hemorrhage and estimated to measure 9.8 cm transverse by 1.6 cm AP. Fat-containing umbilical hernia.  Bladder: Within normal limits  Reproductive: Prostate is normal in size  Musculoskeletal: Osteopenia. No acute fracture    IMPRESSION:  1.  3 mm nonobstructing stone in the left ureterovesicular junction.  2.  7 mm nonobstructing stone in the lower pole right kidney.  3.  Subacute hemorrhage in the right rectus abdominis along the anterior sheath, measures 1.6 cm in thickness and extends from the umbilicus to the inferior myotendinous junction.  4.   Cirrhosis. Mild splenomegaly is new from the prior and suggestive of portal venous hypertension.  < end of copied text >    < from: US Urinary Bladder (20 @ 08:43) >  FINDINGS:  Bladder: Partially distended with a volume of 103 mL, with unremarkable grayscale appearance. Left ureteral jet is seen. Right ureteral jet is not visualized.  Miscellaneous: Limited imaging of the kidneys demonstrates normal echotexture without evidence of hydronephrosis.    IMPRESSION:   1. Normal sonographic appearance of the urinary bladder.  2. Right ureteral jet is not visualized, of uncertain significance.  3. No hydronephrosis.  < end of copied text > Patient is a 65y old  Male who presents with a chief complaint of acute hypoxemic, hypercapneic resp failure  COPD exacerbation (2020 08:12)    SUBJECTIVE / OVERNIGHT EVENTS:   Pt seen and examined at bedside  No acute events overnight.   Today, pt reports that he continues to have difficulty breathing, although better when compared with yesterday/time of admission. Sometimes skips rescue inhaler doses because pt does not feel like he needs it.   Pt denies CP, abdominal pain, F/C, or dysuria.     Tele: Afib 90s-100; occasionally into unsustained 120s    MEDICATIONS  (STANDING):  acetaZOLAMIDE    Tablet 125 milliGRAM(s) Oral daily  ALBUTerol    90 MICROgram(s) HFA Inhaler 2 Puff(s) Inhalation every 4 hours  aspirin enteric coated 81 milliGRAM(s) Oral daily  azithromycin  IVPB      azithromycin  IVPB 500 milliGRAM(s) IV Intermittent every 24 hours  budesonide 160 MICROgram(s)/formoterol 4.5 MICROgram(s) Inhaler 2 Puff(s) Inhalation two times a day  clopidogrel Tablet 75 milliGRAM(s) Oral daily  enoxaparin Injectable 40 milliGRAM(s) SubCutaneous every 12 hours  furosemide    Tablet 40 milliGRAM(s) Oral two times a day  gabapentin 400 milliGRAM(s) Oral three times a day  methylPREDNISolone sodium succinate Injectable 60 milliGRAM(s) IV Push every 6 hours  pantoprazole    Tablet 40 milliGRAM(s) Oral before breakfast  tamsulosin 0.4 milliGRAM(s) Oral at bedtime  tiotropium 18 MICROgram(s) Capsule 1 Capsule(s) Inhalation daily    MEDICATIONS  (PRN):  acetaminophen   Tablet .. 650 milliGRAM(s) Oral every 6 hours PRN Temp greater or equal to 38C (100.4F), Mild Pain (1 - 3)  ALPRAZolam 0.25 milliGRAM(s) Oral every 12 hours PRN anxiety  aluminum hydroxide/magnesium hydroxide/simethicone Suspension 30 milliLiter(s) Oral every 4 hours PRN Dyspepsia  traZODone 100 milliGRAM(s) Oral at bedtime PRN insomnia    Vital Signs Last 24 Hrs  T(C): 36.3 (2020 05:00), Max: 36.7 (2020 16:22)  T(F): 97.4 (2020 05:00), Max: 98.1 (2020 16:22)  HR: 100 (2020 05:00) (100 - 109)  BP: 135/71 (2020 05:00) (120/61 - 146/61)  RR: 20 (2020 05:00) (19 - 20)  SpO2: 93% on 4L NC (2020 05:00) (93% - 93%)       @ 07:01  -   @ 07:00  --------------------------------------------------------  IN: 0 mL / OUT: 1400 mL / NET: -1400 mL    PHYSICAL EXAM  GENERAL: NAD, well-developed  HEAD:  Atraumatic, Normocephalic  EYES: EOMI, PERRLA, conjunctiva and sclera clear  NECK: Supple, No JVD  CHEST/LUNG: Diffuse expiratory wheezing. NRD. No use of accessory muscles. No Rhonchi. 4L O2 via NC sating in the 90's  HEART: Regular rate and rhythm; No murmurs, rubs, or gallops  ABDOMEN: Soft, Nontender, Nondistended; Bowel sounds present  EXTREMITIES:  2+ Peripheral Pulses, No clubbing, cyanosis, or edema  PSYCH: AAOx3  SKIN: No rashes or lesions      LABS:             14.3   8.39  )-----------( 129      (  @ 08:05 )             44.5      @ 08:05    137  |  101  |  27  ----------------------------<  124  4.2   |  33  |  0.87    Ca    8.6       @ 08:05    TPro  7.0  /  Alb  3.2  /  TBili  0.6  /  DBili  x   /  AST  38  /  ALT  55  /  AlkPhos  95   @ 08:05      Urinalysis Basic - ( 2020 06:01 )  Color: Yellow / Appearance: Clear / S.005 / pH: x  Gluc: x / Ketone: Negative  / Bili: Negative / Urobili: Negative mg/dL   Blood: x / Protein: Negative mg/dL / Nitrite: Negative   Leuk Esterase: Negative / RBC: >50 /HPF / WBC 0-2   Sq Epi: x / Non Sq Epi: Negative / Bacteria: Negative    Culture - Blood (collected 2020 19:43)  Source: .Blood Blood-Peripheral  Preliminary Report (2020 01:01):    No growth to date.    < from: CT Abdomen and Pelvis w/wo IV Cont (20 @ 09:09) >  FINDINGS:  ABDOMEN/PELVIS:  Lower Chest: Cardiomegaly. Coronary artery calcifications.  Liver: Cirrhotic morphology..  Gallbladder/Billary: Partially distended, shadowing calculus  Pancreas: Within normal limits  Spleen: Mildly enlarged. Calcified granulomata.  Adrenal Glands: Within normal limits  Kidneys: Symmetric size, enhancement and excretion of contrast. There is a nonobstructing 3 mm stone in the left ureterovesicular junction. Nonobstructing stone in the lower pole right kidney measures7 mm..  GI Tract: No bowel obstruction or abnormal distention. Normal appendix.  Mesentery/Peritoneum: No free air or ascites.  Vasculature: Dense calcified plaque in the abdominal aorta without aneurysm.  Lymph Nodes: No enlarged lymph node measuring greater than 10 mm in short axis  Abdominal Wall: Soft tissue density and thickening of the right rectus abdominis along the anterior sheath, from the level of the umbilicus to the inferior myotendinous junction, suspicious for hemorrhage and estimated to measure 9.8 cm transverse by 1.6 cm AP. Fat-containing umbilical hernia.  Bladder: Within normal limits  Reproductive: Prostate is normal in size  Musculoskeletal: Osteopenia. No acute fracture    IMPRESSION:  1.  3 mm nonobstructing stone in the left ureterovesicular junction.  2.  7 mm nonobstructing stone in the lower pole right kidney.  3.  Subacute hemorrhage in the right rectus abdominis along the anterior sheath, measures 1.6 cm in thickness and extends from the umbilicus to the inferior myotendinous junction.  4.   Cirrhosis. Mild splenomegaly is new from the prior and suggestive of portal venous hypertension.  < end of copied text >    < from: US Urinary Bladder (20 @ 08:43) >  FINDINGS:  Bladder: Partially distended with a volume of 103 mL, with unremarkable grayscale appearance. Left ureteral jet is seen. Right ureteral jet is not visualized.  Miscellaneous: Limited imaging of the kidneys demonstrates normal echotexture without evidence of hydronephrosis.    IMPRESSION:   1. Normal sonographic appearance of the urinary bladder.  2. Right ureteral jet is not visualized, of uncertain significance.  3. No hydronephrosis.  < end of copied text >

## 2020-04-22 NOTE — PROGRESS NOTE ADULT - ASSESSMENT
PROBLEMS;    Ac on chronic hypercapnic & hypoxamic respiratory failure  OHS/VIJAY  AC flare of COPD/chronic vs acute on chronic bronchitis  Mild interstitial lung disease-NSIP h/o smoking  COVID negativex2  Pulmonary hypertension  nonobstructing stone in the left ureterovesicular junction/ nonobstructing stone in the lower pole right kidney.  Subacute hemorrhage in the right rectus abdominis along the anterior sheath, measures 1.6 cm in thickness and extends from the umbilicus to the inferior myotendinous junction  Cirrhosis  Mild splenomegaly-portal venous hypertension.  Chronic afib w Watchman device  CHF  BPH  GERD    PLAN;    monitor rectus abd bleed-hold anti-coagulants  iv solumedrols 60mg q6hr first dose stat  iv azithromycin  aerosols  supportive care  covid repeat testing-neg  d/w staff  dvt prophylasix

## 2020-04-22 NOTE — CONSULT NOTE ADULT - SUBJECTIVE AND OBJECTIVE BOX
CHIEF COMPLAINT: Shortness of Breath    Consult: Surgery consulted for a right abdominal wall hematoma found on CT A/P imaging    HPI:  65 year old male with PMHx HFPEF (60% 12/2018), EtOH abuse, A-Fib s/p watchman procedure on 4/2/19, anemia, CHF, COPD, GERD, cirrhosis, HTN, s/p left BKA (18 years ago, traumatic injury), morbid obesity, came to ED with c/o SOB since the morning of 04-18 which got worse. Patient did have fever for two days with cough upon arrival. Upon arrival in ED, pt was in rapid afib in 180s upon EMS arrival with O2 sat in 80s. Pt placed on NRB via EMS now with O2 sat of 97. Pt was given 125 solumedrol and 30 mg cardizem via EMS.    Today, patient is feeling better but overall short of breath, denies any acute event, currently on NC oxygen 4L. Patient denies any abdominal pain to and just complain of shortness of breath and some chronic pain in his left leg.    PAST MEDICAL HX  Alcohol abuse    Anemia    CHF (congestive heart failure)  diastolic   Chronic atrial fibrillation    Chronic obstructive pulmonary disease (COPD)    Cirrhosis    Collapsed lung    Falls    GERD (gastroesophageal reflux disease)    HTN hypertension    Meningitis    Poor historian    Presence of Watchman left atrial appendage closure device.  S/P BKA (below knee amputation) unilateral, left    Unilateral amputation of lower extremity below knee.    FAMILY HX  Family history unknown: Adopted    SOCIAL HX  lives alone  former smoker  alcohol use last 2-3 days ago (19 Apr 2020 03:19)    Allergies    Ceclor (Rash)  Duricef (Rash)    MEDICATIONS:  MEDICATIONS  (STANDING):  acetaZOLAMIDE    Tablet 125 milliGRAM(s) Oral daily  ALBUTerol    90 MICROgram(s) HFA Inhaler 2 Puff(s) Inhalation every 4 hours  aspirin enteric coated 81 milliGRAM(s) Oral daily  budesonide 160 MICROgram(s)/formoterol 4.5 MICROgram(s) Inhaler 2 Puff(s) Inhalation two times a day  clopidogrel Tablet 75 milliGRAM(s) Oral daily  diltiazem    milliGRAM(s) Oral daily  enoxaparin Injectable 40 milliGRAM(s) SubCutaneous every 12 hours  furosemide   Injectable 40 milliGRAM(s) IV Push two times a day  gabapentin 400 milliGRAM(s) Oral three times a day  methylPREDNISolone sodium succinate Injectable 40 milliGRAM(s) IV Push every 8 hours  pantoprazole    Tablet 40 milliGRAM(s) Oral before breakfast  potassium chloride    Tablet ER 40 milliEquivalent(s) Oral once  tamsulosin 0.4 milliGRAM(s) Oral at bedtime  tiotropium 18 MICROgram(s) Capsule 1 Capsule(s) Inhalation daily    MEDICATIONS  (PRN):  acetaminophen   Tablet .. 650 milliGRAM(s) Oral every 6 hours PRN Temp greater or equal to 38C (100.4F), Mild Pain (1 - 3)  aluminum hydroxide/magnesium hydroxide/simethicone Suspension 30 milliLiter(s) Oral every 4 hours PRN Dyspepsia  traZODone 100 milliGRAM(s) Oral at bedtime PRN insomnia      REVIEW OF SYSTEMS:    CONSTITUTIONAL: No weakness, fevers or chills  EYES/ENT: No visual changes;  No vertigo or throat pain   NECK: No pain or stiffness  RESPIRATORY:  sob better   CARDIOVASCULAR: No chest pain or palpitations  GASTROINTESTINAL: No abdominal or epigastric pain. No nausea, vomiting, or hematemesis; No diarrhea or constipation. No melena or hematochezia.  GENITOURINARY: No dysuria, frequency or hematuria  NEUROLOGICAL: No numbness or weakness  SKIN: No itching, burning, rashes, or lesions   All other review of systems is negative unless indicated above    Vital Signs Last 24 Hrs  T(C): 36.7 (22 Apr 2020 10:51), Max: 36.7 (21 Apr 2020 16:22)  T(F): 98.1 (22 Apr 2020 10:51), Max: 98.1 (21 Apr 2020 16:22)  HR: 115 (22 Apr 2020 10:51) (100 - 115)  BP: 136/82 (22 Apr 2020 10:51) (135/71 - 146/61)  RR: 22 (22 Apr 2020 10:51) (19 - 22)  SpO2: 94% (22 Apr 2020 10:51) (93% - 94%)    PHYSICAL EXAM:    Constitutional: NAD, awake and alert, Obese   HEENT: atraumatic, normocephalic, PERRLA, EOMI  Neck:  supple,  No JVD  Chest: nontender, no echymmosis noted  Cardiovascular: S1 and S2, irregular rate and rhythm   Gastrointestinal: soft, large abd girth, nontender, bowel sounds present, three ecchymosis present on RLE abdomen at VTE injection sites  Extremities:  peripheral edema right LE, Left BKA   Vascular:  RLE feeble, nonpalpable pulses   Neurological: A/O x 3, no focal deficits  Musculoskeletal: no calf tenderness, LLE BKA  Skin: RLE stasis mild erythema      LABS: All Labs Reviewed:                                   14.3   8.39  )-----------( 129      ( 22 Apr 2020 08:05 )             44.5   04-22    137  |  101  |  27<H>  ----------------------------<  124<H>  4.2   |  33<H>  |  0.87    Ca    8.6      22 Apr 2020 08:05    TPro  7.0  /  Alb  3.2<L>  /  TBili  0.6  /  DBili  x   /  AST  38<H>  /  ALT  55  /  AlkPhos  95  04-22      RADIOLOGY:    < from: CT Abdomen and Pelvis w/wo IV Cont (04.22.20 @ 09:09) >  Abdominal Wall: Soft tissue density and thickening of the right rectus abdominis along the anterior sheath, from the level of the umbilicus to the inferior myotendinous junction, suspicious for hemorrhage and estimated to measure 9.8 cm transverse by 1.6 cm AP. Fat-containing umbilical hernia.      1.  3 mm nonobstructing stone in the left ureterovesicular junction.  2.  7 mm nonobstructing stone in the lower pole right kidney.  3.  Subacute hemorrhage in the right rectus abdominis along the anterior sheath, measures 1.6 cm in thickness and extends from the umbilicus to the inferior myotendinous junction.  4.   Cirrhosis. Mild splenomegaly is new from the prior and suggestive of portal venous hypertension.    < end of copied text >

## 2020-04-22 NOTE — PROGRESS NOTE ADULT - ASSESSMENT
66yo obese Male w/ PMH Chronic AFib (s/p Watchman device), CHF, COPD and HTN whom presented to the ED via EMS w/ c/o SOB since the morning of 04-18.  Pt found to have acute on chronic respiratory failure due to COPD exacerbation vs CHF exacerbation in accordance with Rapid A-Fib    #Acute on chronic hypercapnic + hypoxemic respiratory failure:  2/2 presumed COPD/Cute on Chronic CHF exacerbation.   COVID negative x2.  CT chest negative for PNA or signs of COVID. D-Dimer <150  Pt now on supplemental O2 via NC @ 4L (Pt is s/p NRB and BIPAP on admission)  Slowly improving. Being followed by both cardio & pulm.   - C/w Supplemental O2 via NC w/ goal O2% >92%    - Per Pulm, Increase IV Solumedrol from 40mg IV Q8hrs to 60mg Q6.   - C/w Spiriva 18mg, Advair BID & Albuterol MDI q4 hours  - C/w Azithromycin for total 5day course given COPD exacerbation. Day 3/5  - Pulm consulted. Continued recs appreciated    #Acute on Chronic Systolic CHF exacerbation:    Improving. Cardio following.  - Per cardio, decrease Lasix 40mg BID to 40mg QD   - C/w home Acetazolamide  - Daily weights  - C/w ASA 81 mg & Plavix 75 mg  - Continued cardio recs appreciated    # Hematuria: No gross bleeding noted. Denies dysuria or increased frequency. This was noted on admission. VS WNL  - F/u CT A/P and US bladder, ordered by urology  - F/u H&H in AM   - If worsening can consider holding DAPT    #Hypokalemia:   Replete and recheck.      #Persistent afib with uncontrolled rate on admission:    Improved, although ventricular rate often >100.   - Per cardiology, increase diltiazem to 240mg   - C/w tele monitoring for now.    - Continued Cardio recs appreciated    #VIJAY  - Not compliant with CPAP @ home    #BPH  - c/w Flomax 0.4 mg    #GERD  - c/w Protonix 40 mg    #Preventive Measures  - VTE: Lovenox 64yo obese Male w/ PMH Chronic AFib (s/p Watchman device), CHF, COPD and HTN whom presented to the ED via EMS w/ c/o SOB since the morning of 04-18.  Pt found to have acute on chronic respiratory failure due to COPD exacerbation vs CHF exacerbation in accordance with Rapid A-Fib    #Acute on chronic hypercapnic + hypoxemic respiratory failure:  2/2 presumed COPD/Cute on Chronic CHF exacerbation.   COVID negative x2.  CT chest negative for PNA or signs of COVID. D-Dimer <150  Pt now on supplemental O2 via NC @ 4L (Pt is s/p NRB and BIPAP on admission)  Slowly improving. Being followed by both cardio & pulm.   - Wean supplemental O2 as tolerated, keep O2 sat >92%  - Per Pulm, c/w IV Solumedrol 60mg Q6.   - C/w Spiriva 18mg, Advair BID & Albuterol MDI q4 hours  - C/w Azithromycin for total 5day course given COPD exacerbation. Day 3/5  - Pulm consulted. Continued recs appreciated    #Acute on Chronic Systolic CHF exacerbation:    Improving. Cardio following.  - Per cardio, decrease Lasix 40mg BID to 40mg QD   - C/w home Acetazolamide  - Daily weights  - C/w ASA 81 mg & Plavix 75 mg  - Continued cardio recs appreciated    # Hematuria: No gross bleeding noted. Denies dysuria or increased frequency. This was noted on admission. VS WNL  - CT A/P shows non-obstructing nephrolithiasis (2 stones; <7mm)   - H&H stable at 14/45  - If worsening can consider holding DAPT    #Hypokalemia:   - s/p repletion  - Resolved, K=4.2    #Persistent afib with uncontrolled rate on admission:    Improved, although ventricular rate often >100.   - Per cardiology, increase diltiazem to 240mg   - C/w tele monitoring for now.    - Continued Cardio recs appreciated    #VIJAY  - Not compliant with CPAP @ home    #BPH  - c/w Flomax 0.4 mg    #GERD  - c/w Protonix 40 mg    #Preventive Measures  - VTE: Lovenox 66yo obese Male w/ PMH Chronic AFib (s/p Watchman device), CHF, COPD and HTN whom presented to the ED via EMS w/ c/o SOB since the morning of 04-18.  Pt found to have acute on chronic respiratory failure due to COPD exacerbation vs CHF exacerbation in accordance with Rapid A-Fib    #Acute on chronic hypercapnic + hypoxemic respiratory failure:  2/2 presumed COPD/Acute on Chronic mild CHF exacerbation.   COVID negative x2.  CT chest negative for PNA or signs of COVID. D-Dimer <150  Pt remains on supplemental O2 via NC @ 4L  Slowly improving. Being followed by both cardio & pulm.  - Wean supplemental O2 as tolerated, keep O2 sat >92%  - C/w Azithromycin for total 5day course given COPD exacerbation. Day 3/5  - C/w IV Solumedrol 60mg Q6  - C/w Spiriva 18mg, Advair BID & Albuterol MDI q4 hours  - Continued Pulm recs appreciated    #Acute on Chronic Systolic CHF exacerbation:    Improving. Cardio following.  - Per cardio, decrease Lasix from 40mg PO BID to 40mg PO QD   - C/w home Acetazolamide  - Daily weights  - C/w ASA 81 mg & Plavix 75 mg  - Continued cardio recs appreciated    # Hematuria: No gross bleeding noted. Denies dysuria or increased frequency. This was noted on admission. VS WNL  - CT A/P shows non-obstructing nephrolithiasis (2 stones; <7mm)   - H&H stable at 14/45  - If worsening can consider holding DAPT  - Appreciate additional recs from Uro consultation    # Deep subacute hematoma in the right abdominal wall rectus abdominis muscle along anterior sheath  Pt hemodynamically stable. Stable H&H. Gen Surg consulted and recommend continuing to Trend H/H, Consider VTE injection sites in a different area, Ok to c/w Antiplatelet therapy as their is no contraindication  at this time. no surgical intervention at this time    # Hypokalemia: Resolved, K=4.2  - Continue to monitor     #Persistent afib with uncontrolled rate on admission:    Improved, although ventricular rate often >100.   - Per cardiology, increase diltiazem to 240mg   - C/w tele monitoring for now.    - Continued Cardio recs appreciated    #VIJAY  - Not compliant with CPAP @ home    #BPH:  - c/w Flomax 0.4 mg    #GERD:  - c/w Protonix 40 mg    #Preventive Measures  - VTE: Lovenox

## 2020-04-22 NOTE — CONSULT NOTE ADULT - ASSESSMENT
65 M with multiple comorbidities including CAD, CHF and AFib, currently on Lovenox 40 BID, Plavix and ASA daily, found to have a 9.8cm x 1.6cm deep subacute hematoma in the right abdominal wall rectus abdominis muscle along anterior sheath    -can trend H/H but has been stable in past few days (Hb ~14)  -ice compress for comfort care  -consider VTE injection sites in a different area  -no contraindication to antiplatelet therapy at this time  -no surgical intervention at this time  -thank you for the consult    Plan discussed with surgery attending, Dr Pereira

## 2020-04-22 NOTE — PROGRESS NOTE ADULT - SUBJECTIVE AND OBJECTIVE BOX
Subjective:    pat ct abd s/q bleed in rectus abdminis, non-obstructive stone.    Home Medications:  albuterol 2.5 mg/3 mL (0.083%) inhalation solution: 3 milliliter(s) inhaled every 6 hours, As Needed -for shortness of breath and/or wheezing (2020 03:12)  gabapentin 400 mg oral capsule: 1 cap(s) orally 3 times a day (2020 03:12)  pantoprazole 40 mg oral granule, enteric coated: 40 milligram(s) orally once a day (2020 03:12)  Spiriva 18 mcg inhalation capsule: 1 cap(s) inhaled once a day (2020 03:12)  tamsulosin 0.4 mg oral capsule: 1 cap(s) orally once a day (at bedtime) (2020 03:12)  traZODone 50 mg oral tablet: 2 tab(s) orally once a day (at bedtime), As Needed (2020 03:12)    MEDICATIONS  (STANDING):  acetaZOLAMIDE    Tablet 125 milliGRAM(s) Oral daily  ALBUTerol    90 MICROgram(s) HFA Inhaler 2 Puff(s) Inhalation every 4 hours  aspirin enteric coated 81 milliGRAM(s) Oral daily  azithromycin  IVPB      azithromycin  IVPB 500 milliGRAM(s) IV Intermittent every 24 hours  budesonide 160 MICROgram(s)/formoterol 4.5 MICROgram(s) Inhaler 2 Puff(s) Inhalation two times a day  clopidogrel Tablet 75 milliGRAM(s) Oral daily  enoxaparin Injectable 40 milliGRAM(s) SubCutaneous every 12 hours  furosemide    Tablet 40 milliGRAM(s) Oral two times a day  gabapentin 400 milliGRAM(s) Oral three times a day  methylPREDNISolone sodium succinate Injectable 60 milliGRAM(s) IV Push every 6 hours  pantoprazole    Tablet 40 milliGRAM(s) Oral before breakfast  tamsulosin 0.4 milliGRAM(s) Oral at bedtime  tiotropium 18 MICROgram(s) Capsule 1 Capsule(s) Inhalation daily    MEDICATIONS  (PRN):  acetaminophen   Tablet .. 650 milliGRAM(s) Oral every 6 hours PRN Temp greater or equal to 38C (100.4F), Mild Pain (1 - 3)  ALPRAZolam 0.25 milliGRAM(s) Oral every 12 hours PRN anxiety  aluminum hydroxide/magnesium hydroxide/simethicone Suspension 30 milliLiter(s) Oral every 4 hours PRN Dyspepsia  traZODone 100 milliGRAM(s) Oral at bedtime PRN insomnia      Allergies    Ceclor (Rash)  Duricef (Rash)    Intolerances        Vital Signs Last 24 Hrs  T(C): 36.7 (2020 10:51), Max: 36.7 (2020 16:22)  T(F): 98.1 (2020 10:51), Max: 98.1 (2020 16:22)  HR: 115 (2020 10:51) (100 - 115)  BP: 136/82 (2020 10:51) (135/71 - 146/61)  BP(mean): --  RR: 22 (2020 10:51) (19 - 22)  SpO2: 94% (2020 10:51) (93% - 94%)      PHYSICAL EXAMINATION:    NECK:  Supple. No lymphadenopathy. Jugular venous pressure not elevated. Carotids equal.   HEART:   The cardiac impulse has a normal quality. Reg., Nl S1 and S2.  There are no murmurs, rubs or gallops noted  CHEST:  Chest rhonchi to auscultation. Normal respiratory effort.  ABDOMEN:  Soft and nontender.   EXTREMITIES:  There is no edema.       LABS:                        14.3   8.39  )-----------( 129      ( 2020 08:05 )             44.5     -    137  |  101  |  27<H>  ----------------------------<  124<H>  4.2   |  33<H>  |  0.87    Ca    8.6      2020 08:05    TPro  7.0  /  Alb  3.2<L>  /  TBili  0.6  /  DBili  x   /  AST  38<H>  /  ALT  55  /  AlkPhos  95        Urinalysis Basic - ( 2020 06:01 )    Color: Yellow / Appearance: Clear / S.005 / pH: x  Gluc: x / Ketone: Negative  / Bili: Negative / Urobili: Negative mg/dL   Blood: x / Protein: Negative mg/dL / Nitrite: Negative   Leuk Esterase: Negative / RBC: >50 /HPF / WBC 0-2   Sq Epi: x / Non Sq Epi: Negative / Bacteria: Negative      CT Abdomen and Pelvis w/wo IV Cont (20 @ 09:09) >    1.  3 mm nonobstructing stone in the left ureterovesicular junction.  2.  7 mm nonobstructing stone in the lower pole right kidney.  3.  Subacute hemorrhage in the right rectus abdominis along the anterior sheath, measures 1.6 cm in thickness and extends from the umbilicus to the inferior myotendinous junction.  4.   Cirrhosis. Mild splenomegaly is new from the prior and suggestive of portal venous hypertension.

## 2020-04-22 NOTE — PROGRESS NOTE ADULT - SUBJECTIVE AND OBJECTIVE BOX
REASON FOR VISIT: AF, Rx management    HPI:  65 year old man with a history of chronic AF, Watchman device (implanted 4/2019), HTN, CVAs, alcoholism, tobacco abuse, COPD, HFpEF (60% 12/2018), GERD, cirrhosis, left BKA following traumatic injury admitted on 4/18/2020 with  acute on chronic hypercapnic & hypoxemic respiratory failure due to suspected exacerbation of COPD and/or bronchitis.    4/21/20 Patient complain of wheezing ,no sob at rest , on 4 L NC O2  despite of being on IV lasix ,  heart rate is 80 to 100s    4/22/2020:  "I'm still wheezing."  No new complaints.    MEDICATIONS  (STANDING):  acetaZOLAMIDE    Tablet 125 milliGRAM(s) Oral daily  ALBUTerol    90 MICROgram(s) HFA Inhaler 2 Puff(s) Inhalation every 4 hours  aspirin enteric coated 81 milliGRAM(s) Oral daily  azithromycin  IVPB 500 milliGRAM(s) IV Intermittent every 24 hours  budesonide 160 MICROgram(s)/formoterol 4.5 MICROgram(s) Inhaler 2 Puff(s) Inhalation two times a day  clopidogrel Tablet 75 milliGRAM(s) Oral daily  diltiazem    milliGRAM(s) Oral daily  enoxaparin Injectable 40 milliGRAM(s) SubCutaneous every 12 hours  furosemide    Tablet 40 milliGRAM(s) Oral two times a day  gabapentin 400 milliGRAM(s) Oral three times a day  methylPREDNISolone sodium succinate Injectable 60 milliGRAM(s) IV Push every 6 hours  pantoprazole    Tablet 40 milliGRAM(s) Oral before breakfast  tamsulosin 0.4 milliGRAM(s) Oral at bedtime  tiotropium 18 MICROgram(s) Capsule 1 Capsule(s) Inhalation daily    Vital Signs Last 24 Hrs  T(C): 36.3 (22 Apr 2020 05:00), Max: 36.7 (21 Apr 2020 16:22)  T(F): 97.4 (22 Apr 2020 05:00), Max: 98.1 (21 Apr 2020 16:22)  HR: 100 (22 Apr 2020 05:00) (100 - 109)  BP: 135/71 (22 Apr 2020 05:00) (120/61 - 146/61)  RR: 20 (22 Apr 2020 05:00) (19 - 20)  SpO2: 93% (22 Apr 2020 05:00) (93% - 93%)    PHYSICAL EXAM:  Constitutional: Seated in bed, no distress, obese   Respiratory: Nonlabored, + wheeze and rhonchi   Cardiovascular: Irregular  Gastrointestinal: Abdomen is soft, nontender.   Extremities:  + leg edema (bilateral) with stasis erythema    LABS:               14.5   9.51  )-----------( 129      ( 21 Apr 2020 07:22 )             45.2     135  |  97  |  21  ----------------------------<  125<H>  3.3<L>   |  33<H>  |  0.85    Transthoracic Echocardiogram (06.10.19 @ 20:49):   Estimated left ventricular ejection fraction is 60-65 %. The left ventricle is normal in wall thickness, wall motion and contractility as seen in limited views.   The left ventricle cavity is moderately dilated.   The left atrium is severely dilated.   The right atrium appears severely dilated.   Normal appearing right ventricle structure and function.   Moderate (2+) mitral regurgitation.   Mild to Moderate Tricuspid regurgitation.     US Duplex Venous Lower Ext Complete, Bilateral (04.19.20 @ 16:17):  No evidence of deep venous thrombosis in either lower extremity.    Tele: AF

## 2020-04-22 NOTE — PROGRESS NOTE ADULT - SUBJECTIVE AND OBJECTIVE BOX
Code Stroke Eval:    Patient is a 65y old  Male who presents with a chief complaint of acute hypoxemic, hypercapneic resp failure  COPD exacerbation (22 Apr 2020 14:07)    HPI:  65 year old male w chronic AFib w Watchman device, CHF, COPD, HTN, obesity came to ED w EMS c/o SOB since the morning of 04-18.  Per EMS, pt was in rapid afib in 180s upon EMS arrival with O2 sat in 80s. pt placed on NRB via EMS now with O2 sat of 97.   pt given 125 solumedrol and 30 mg cardizem via EMS.  Pt denies fevers, cough, chest pain. States never experienced similar sx before. Pt reports he is supposed to be on home O2 but isn't and supposed to use CPAP machine at night and isn't.    In ED /85  HR 99  RR 25  T 98.6  97% sat NRB 15 L  given solumedrol 125 mg by ED as well as albuterol x 1. magnesium IV  CXR clear; COVID-19 not detected  ABG revealed hypercapnea x 2; trial of BiPAP    ---------------------------------------------------    4/22 evening/night: Code Stroke called for lethargy, disorientation w/ slurred speech. Pt on Dual therapy (ASA and Plavix) along with Lovenox 40 BID (not full therapeutic dose).  Pt with NIHSS of 4 and sent for CTH/CTA head and neck to eval.  Pt with 's initially repeat was .  Irregular / irregular c/w Afib  (+ watchmans procedure).  Echo with MR/TR 2+.  CT abd / pelvis with abdominal rectus heme 2/2 lovenox dosing.  Also pt noted to have hematuria with significant renal stone identification - this would negate TPA considerations.  Neuroscience team responded to evaluate patient accordingly.    NIHSS: Score of 4  1A: Level of consciousness —> 1 = Arouses to minor stimulation  1B: Ask month and age —> 1 = 1 question right  1C: 'Blink eyes' & 'squeeze hands' —> 0 = Performs both tasks  2: Horizontal extraocular movements —> 0 = Normal  3: Visual fields —> 0 = No visual loss  4: Facial palsy —> 0 = Normal symmetry  5A: Left arm motor drift —> 0 = No drift for 10 seconds  5B: Right arm motor drift —> 0 = No drift for 10 seconds  6A: Left leg motor drift —> 0 = No drift for 5 seconds  6B: Right leg motor drift —> 0 = No drift for 5 seconds  7: Limb Ataxia —> 0 = No ataxia  8: Sensation —> 0 = Normal; no sensory loss  9: Language/aphasia —> 1 = Mild-moderate aphasia: some obvious changes, without significant limitation  10: Dysarthria —> 1 = Mild-moderate dysarthria: slurring but can be understood  11: Extinction/inattention —> 0 = No abnormality    PAST MEDICAL HX  Alcohol abuse    Alcohol withdrawal    Anemia    AFIB atrial fibrillation    CHF (congestive heart failure)    Chronic atrial fibrillation    Chronic CHF    Chronic obstructive pulmonary disease (COPD)    Cirrhosis    Collapsed lung    COPD without exacerbation    Emphysema of lung    Falls    GERD (gastroesophageal reflux disease)    HTN hypertension    Meningitis    Poor historian    Presence of Watchman left atrial appendage closure device.    PAST SURGICAL HX  Presence of Watchman left atrial appendage closure device    S/P BKA (below knee amputation) unilateral, left    Unilateral amputation of lower extremity below knee.    FAMILY HX  Family history unknown: Adopted    SOCIAL HX  lives alone  former smoker  alcohol use last 2-3 days ago (19 Apr 2020 03:19)    PAST MEDICAL & SURGICAL HISTORY:  Chronic CHF  COPD without exacerbation  Atrial fibrillation  Presence of Watchman left atrial appendage closure device  Hypertension  GERD (gastroesophageal reflux disease)  Chronic obstructive pulmonary disease (COPD)  Anemia  Falls  Meningitis  Collapsed lung  Alcohol withdrawal  Emphysema of lung  Cirrhosis  CHF (congestive heart failure)  Poor historian  Alcohol abuse  Chronic atrial fibrillation  Unilateral amputation of lower extremity below knee  Presence of Watchman left atrial appendage closure device  S/P BKA (below knee amputation) unilateral, left    FAMILY HISTORY:  No pertinent family history in first degree relatives  Family history unknown: Adopted     Allergies  Ceclor (Rash)  Duricef (Rash)    MEDICATIONS  (STANDING):  acetaZOLAMIDE    Tablet 125 milliGRAM(s) Oral daily  ALBUTerol    90 MICROgram(s) HFA Inhaler 2 Puff(s) Inhalation every 4 hours  aspirin enteric coated 81 milliGRAM(s) Oral daily  azithromycin  IVPB      azithromycin  IVPB 500 milliGRAM(s) IV Intermittent every 24 hours  budesonide 160 MICROgram(s)/formoterol 4.5 MICROgram(s) Inhaler 2 Puff(s) Inhalation two times a day  clopidogrel Tablet 75 milliGRAM(s) Oral daily  enoxaparin Injectable 40 milliGRAM(s) SubCutaneous every 12 hours  furosemide    Tablet 40 milliGRAM(s) Oral daily  gabapentin 400 milliGRAM(s) Oral three times a day  methylPREDNISolone sodium succinate Injectable 60 milliGRAM(s) IV Push every 6 hours  pantoprazole    Tablet 40 milliGRAM(s) Oral before breakfast  tamsulosin 0.4 milliGRAM(s) Oral at bedtime  tiotropium 18 MICROgram(s) Capsule 1 Capsule(s) Inhalation daily     Review of Symptoms  	Gen: no fevers, chills, sweats, weight loss, fatigue  	Visual: no recent changes in vision, no blurriness, no seeing spots  	Cardiovascular: no chest pain, no palpitations, no orthopnea, no leg swelling  	Respiratory: no shortness of breath, no exertional dyspnea, no cough, no rhinorrhea, no nasal congestion  	GI: no difficulty swallowing, no nausea, no vomiting, no abdominal pain, no diarrhea, no constipation, no melana  	: no dysuria, no increased freq, no hematuria, no malodorous urine  	Derm: no wounds, no rashes  	Heme: no easy bleeding or bruising  	MSK: no joint pain, no joint swelling or redness, no extremity pain               Neuro: no headache, + aphasia and dysarthria / challenges in following 2 step commands. O to year, not to president.    Vital Signs Last 24 Hrs  T(C): 36.7 (22 Apr 2020 16:48), Max: 36.7 (22 Apr 2020 10:51)  T(F): 98 (22 Apr 2020 16:48), Max: 98.1 (22 Apr 2020 10:51)  HR: 107 (22 Apr 2020 16:48) (100 - 115)  BP: 136/76 (22 Apr 2020 16:48) (135/71 - 136/82)  BP(mean): --  RR: 20 (22 Apr 2020 16:48) (20 - 22)  SpO2: 96% (22 Apr 2020 16:48) (93% - 96%)    Labs:                        14.3   8.39  )-----------( 129      ( 22 Apr 2020 08:05 )             44.5     04-22    137  |  101  |  27<H>  ----------------------------<  124<H>  4.2   |  33<H>  |  0.87    Ca    8.6      22 Apr 2020 08:05    TPro  7.0  /  Alb  3.2<L>  /  TBili  0.6  /  DBili  x   /  AST  38<H>  /  ALT  55  /  AlkPhos  95  04-22            Radiology report:  - CT head:    Physical Exam:  Constitutional: Awake / alert  HEENT: PERRLA, EOMI  Neck: Supple  Respiratory: Breath sounds are clear bilaterally  Cardiovascular: S1 and S2, regular rhythm  Gastrointestinal: Soft, NT/ND  Extremities:  no edema  Vascular: No carotid Bruit  Musculoskeletal: no joint swelling/tenderness, no abnormal movements  Skin: No rashes    Neurological Exam:  HF: A x O x 3, appropriately interactive, normal affect, speech fluent, no aphasia or paraphasic errors. Naming /repetition intact   CN: PERRL, EOMI, VFF, facial sensation normal, no NLFD, tongue midline  Motor: No pronator drift, Strength 5/5 in all 4 ext, normal bulk and tone, no tremor, rigidity or bradykinesia  Sens: Intact to light touch  Reflexes: Symmetric and normal, downgoing toes b/l  Coord:  No FNFA, dysmetria, ANA intact   Gait/Balance: Cannot test    A/P:  65 year old male w chronic AFib w Watchman device, CHF, COPD, HTN, obesity came to ED w EMS c/o SOB since the morning of 04-18.  Per EMS, pt was in rapid afib in 180s upon EMS arrival with O2 sat in 80s.  HD#4 this evening code stroke for change in MS, speech dysarthria and aphasia / slurred speech.    - No IV tpa given as history of hematuria observed this admission with known sizeable renal stones on CT a/p 4/21.   - ASA/ PLavix will continue dual therapy  - pt with Watchmans and recent echo completed significant MR/TR 2+ - typically no cardiogenic cause (Watchmans in place) but still possibility could exist (very unlikely) - will review CTA of neck and head also once complete  - Statin therapy recommend after am Lipid profile obtained  - HbA1C and TFT's  - Dysphagia screen  - DVT prophylaxis  - MRI/A brain-carotids  - Blood glucose bedside 180's, would continue to aim for -180  - neuro checks q 4 hr	  - PT eval  - EKG now and Trop, CK also  - Critical care eval for step down status overnight  - Speech/swallow eval  - d/w Dr. Menon - may d/w Endovascular team accordingly to see if pt is candidate for intervention after CTA findings obtained.  No TPA due to recent hematuria and known kidney stones.    Total Critical Care Time spent >  62 minutes in evaluating patient, medical record, imaging studies and implementing neuro protective measures to avoid further neurological insult, injury or collapse.  All above recommendations will potentially avoid further stroke progression / expansion / brain injury. Code Stroke Eval:    Patient is a 65y old  Male who presents with a chief complaint of acute hypoxemic, hypercapneic resp failure  COPD exacerbation (22 Apr 2020 14:07)    HPI:  65 year old male w chronic AFib w Watchman device, CHF, COPD, HTN, obesity came to ED w EMS c/o SOB since the morning of 04-18.  Per EMS, pt was in rapid afib in 180s upon EMS arrival with O2 sat in 80s. pt placed on NRB via EMS now with O2 sat of 97.   pt given 125 solumedrol and 30 mg cardizem via EMS.  Pt denies fevers, cough, chest pain. States never experienced similar sx before. Pt reports he is supposed to be on home O2 but isn't and supposed to use CPAP machine at night and isn't.    In ED /85  HR 99  RR 25  T 98.6  97% sat NRB 15 L  given solumedrol 125 mg by ED as well as albuterol x 1. magnesium IV  CXR clear; COVID-19 not detected  ABG revealed hypercapnea x 2; trial of BiPAP    ---------------------------------------------------    4/22 evening/night: Code Stroke called for lethargy, disorientation w/ slurred speech. Pt on Dual therapy (ASA and Plavix) along with Lovenox 40 BID (not full therapeutic dose).  Pt with NIHSS of 4 and sent for CTH/CTA head and neck to eval.  Pt with 's initially repeat was .  Irregular / irregular c/w Afib  (+ watchmans procedure).  Echo with MR/TR 2+.  CT abd / pelvis with abdominal rectus heme 2/2 lovenox dosing.  Also pt noted to have hematuria with significant renal stone identification - this would negate TPA considerations.  Neuroscience team responded to evaluate patient accordingly.    NIHSS: Score of 4  1A: Level of consciousness —> 1 = Arouses to minor stimulation  1B: Ask month and age —> 1 = 1 question right  1C: 'Blink eyes' & 'squeeze hands' —> 0 = Performs both tasks  2: Horizontal extraocular movements —> 0 = Normal  3: Visual fields —> 0 = No visual loss  4: Facial palsy —> 0 = Normal symmetry  5A: Left arm motor drift —> 0 = No drift for 10 seconds  5B: Right arm motor drift —> 0 = No drift for 10 seconds  6A: Left leg motor drift —> 0 = No drift for 5 seconds  6B: Right leg motor drift —> 0 = No drift for 5 seconds  7: Limb Ataxia —> 0 = No ataxia  8: Sensation —> 0 = Normal; no sensory loss  9: Language/aphasia —> 1 = Mild-moderate aphasia: some obvious changes, without significant limitation  10: Dysarthria —> 1 = Mild-moderate dysarthria: slurring but can be understood  11: Extinction/inattention —> 0 = No abnormality    PAST MEDICAL HX  Alcohol abuse    Alcohol withdrawal    Anemia    AFIB atrial fibrillation    CHF (congestive heart failure)    Chronic atrial fibrillation    Chronic CHF    Chronic obstructive pulmonary disease (COPD)    Cirrhosis    Collapsed lung    COPD without exacerbation    Emphysema of lung    Falls    GERD (gastroesophageal reflux disease)    HTN hypertension    Meningitis    Poor historian    Presence of Watchman left atrial appendage closure device.    PAST SURGICAL HX  Presence of Watchman left atrial appendage closure device    S/P BKA (below knee amputation) unilateral, left    Unilateral amputation of lower extremity below knee.    FAMILY HX  Family history unknown: Adopted    SOCIAL HX  lives alone  former smoker  alcohol use last 2-3 days ago (19 Apr 2020 03:19)    PAST MEDICAL & SURGICAL HISTORY:  Chronic CHF  COPD without exacerbation  Atrial fibrillation  Presence of Watchman left atrial appendage closure device  Hypertension  GERD (gastroesophageal reflux disease)  Chronic obstructive pulmonary disease (COPD)  Anemia  Falls  Meningitis  Collapsed lung  Alcohol withdrawal  Emphysema of lung  Cirrhosis  CHF (congestive heart failure)  Poor historian  Alcohol abuse  Chronic atrial fibrillation  Unilateral amputation of lower extremity below knee  Presence of Watchman left atrial appendage closure device  S/P BKA (below knee amputation) unilateral, left    FAMILY HISTORY:  No pertinent family history in first degree relatives  Family history unknown: Adopted     Allergies  Ceclor (Rash)  Duricef (Rash)    MEDICATIONS  (STANDING):  acetaZOLAMIDE    Tablet 125 milliGRAM(s) Oral daily  ALBUTerol    90 MICROgram(s) HFA Inhaler 2 Puff(s) Inhalation every 4 hours  aspirin enteric coated 81 milliGRAM(s) Oral daily  azithromycin  IVPB      azithromycin  IVPB 500 milliGRAM(s) IV Intermittent every 24 hours  budesonide 160 MICROgram(s)/formoterol 4.5 MICROgram(s) Inhaler 2 Puff(s) Inhalation two times a day  clopidogrel Tablet 75 milliGRAM(s) Oral daily  enoxaparin Injectable 40 milliGRAM(s) SubCutaneous every 12 hours  furosemide    Tablet 40 milliGRAM(s) Oral daily  gabapentin 400 milliGRAM(s) Oral three times a day  methylPREDNISolone sodium succinate Injectable 60 milliGRAM(s) IV Push every 6 hours  pantoprazole    Tablet 40 milliGRAM(s) Oral before breakfast  tamsulosin 0.4 milliGRAM(s) Oral at bedtime  tiotropium 18 MICROgram(s) Capsule 1 Capsule(s) Inhalation daily     Review of Symptoms  	Gen: no fevers, chills, sweats, weight loss, fatigue  	Visual: no recent changes in vision, no blurriness, no seeing spots  	Cardiovascular: no chest pain, no palpitations, no orthopnea, no leg swelling  	Respiratory: no shortness of breath, no exertional dyspnea, no cough, no rhinorrhea, no nasal congestion  	GI: no difficulty swallowing, no nausea, no vomiting, no abdominal pain, no diarrhea, no constipation, no melana  	: no dysuria, no increased freq, no hematuria, no malodorous urine  	Derm: no wounds, no rashes  	Heme: no easy bleeding or bruising  	MSK: no joint pain, no joint swelling or redness, no extremity pain               Neuro: no headache, + aphasia and dysarthria / challenges in following 2 step commands. O to year, not to president.    Vital Signs Last 24 Hrs  T(C): 36.7 (22 Apr 2020 16:48), Max: 36.7 (22 Apr 2020 10:51)  T(F): 98 (22 Apr 2020 16:48), Max: 98.1 (22 Apr 2020 10:51)  HR: 107 (22 Apr 2020 16:48) (100 - 115)  BP: 136/76 (22 Apr 2020 16:48) (135/71 - 136/82)  BP(mean): --  RR: 20 (22 Apr 2020 16:48) (20 - 22)  SpO2: 96% (22 Apr 2020 16:48) (93% - 96%)    Labs:                        14.3   8.39  )-----------( 129      ( 22 Apr 2020 08:05 )             44.5     04-22    137  |  101  |  27<H>  ----------------------------<  124<H>  4.2   |  33<H>  |  0.87    Ca    8.6      22 Apr 2020 08:05    TPro  7.0  /  Alb  3.2<L>  /  TBili  0.6  /  DBili  x   /  AST  38<H>  /  ALT  55  /  AlkPhos  95  04-22            Radiology report:  - CT head:    Physical Exam:  Constitutional: Awake / alert  HEENT: PERRLA, EOMI  Neck: Supple  Respiratory: Breath sounds are clear bilaterally  Cardiovascular: S1 and S2, regular rhythm  Gastrointestinal: Soft, NT/ND  Extremities:  no edema  Vascular: No carotid Bruit  Musculoskeletal: no joint swelling/tenderness, no abnormal movements  Skin: No rashes    Neurological Exam:  HF: A x O x 3, appropriately interactive, normal affect, speech fluent, no aphasia or paraphasic errors. Naming /repetition intact   CN: PERRL, EOMI, VFF, facial sensation normal, no NLFD, tongue midline  Motor: No pronator drift, Strength 5/5 in all 4 ext, normal bulk and tone, no tremor, rigidity or bradykinesia  Sens: Intact to light touch  Reflexes: Symmetric and normal, downgoing toes b/l  Coord:  No FNFA, dysmetria, ANA intact   Gait/Balance: Cannot test    A/P:  65 year old male w chronic AFib w Watchman device, CHF, COPD, HTN, obesity came to ED w EMS c/o SOB since the morning of 04-18.  Per EMS, pt was in rapid afib in 180s upon EMS arrival with O2 sat in 80s.  HD#4 this evening code stroke for change in MS, speech dysarthria and aphasia / slurred speech.    - TPA to be prepared but on hold until results of CTA and f/u exam of NIHSS.  Pt with hematuria observed this admission with known sizeable renal stones on CT a/p 4/21.   Urology does not believe of any significance.  Urine per Nursing staff no yan blood identified in urinal.  - ASA/ PLavix will continue dual therapy  - pt with Watchmans and recent echo completed significant MR/TR 2+ - typically no cardiogenic cause (Watchmans in place) but still possibility could exist (very unlikely) - will review CTA of neck and head also once complete  - Statin therapy recommend after am Lipid profile obtained  - HbA1C and TFT's  - Dysphagia screen  - DVT prophylaxis  - MRI/A brain-carotids  - Blood glucose bedside 180's, would continue to aim for -180  - neuro checks q 4 hr	  - PT eval  - EKG now and Trop, CK also  - Critical care eval for step down status overnight  - Speech/swallow eval  - d/w Dr. Menon - will re-evaluate pt after return from CTA to see if candidate for TPA intervention.  TPA on hold due to recent hematuria and known kidney stones and further evaluation / follow up of NIHSS.    Total Critical Care Time spent >  62 minutes in evaluating patient, medical record, imaging studies and implementing neuro protective measures to avoid further neurological insult, injury or collapse.  All above recommendations will potentially avoid further stroke progression / expansion / brain injury. Code Stroke Eval:    Patient is a 65y old  Male who presents with a chief complaint of acute hypoxemic, hypercapneic resp failure  COPD exacerbation (22 Apr 2020 14:07)    HPI:  65 year old male w chronic AFib w Watchman device, CHF, COPD, HTN, obesity came to ED w EMS c/o SOB since the morning of 04-18.  Per EMS, pt was in rapid afib in 180s upon EMS arrival with O2 sat in 80s. pt placed on NRB via EMS now with O2 sat of 97.   pt given 125 solumedrol and 30 mg cardizem via EMS.  Pt denies fevers, cough, chest pain. States never experienced similar sx before. Pt reports he is supposed to be on home O2 but isn't and supposed to use CPAP machine at night and isn't.    In ED /85  HR 99  RR 25  T 98.6  97% sat NRB 15 L  given solumedrol 125 mg by ED as well as albuterol x 1. magnesium IV  CXR clear; COVID-19 not detected  ABG revealed hypercapnea x 2; trial of BiPAP    ---------------------------------------------------    4/22 evening/night: Code Stroke called for lethargy, disorientation w/ slurred speech. Pt on Dual therapy (ASA and Plavix) along with Lovenox 40 BID (not full therapeutic dose).  Pt with NIHSS of 4 and sent for CTH/CTA head and neck to eval.  Pt with 's initially repeat was .  Irregular / irregular c/w Afib  (+ watchmans procedure).  Echo with MR/TR 2+.  CT abd / pelvis with abdominal rectus heme 2/2 lovenox dosing.  Also pt noted to have hematuria with significant renal stone identification - this would negate TPA considerations.  Neuroscience team responded to evaluate patient accordingly.    NIHSS: Score of 4  1A: Level of consciousness —> 1 = Arouses to minor stimulation  1B: Ask month and age —> 1 = 1 question right  1C: 'Blink eyes' & 'squeeze hands' —> 0 = Performs both tasks  2: Horizontal extraocular movements —> 0 = Normal  3: Visual fields —> 0 = No visual loss  4: Facial palsy —> 0 = Normal symmetry  5A: Left arm motor drift —> 0 = No drift for 10 seconds  5B: Right arm motor drift —> 0 = No drift for 10 seconds  6A: Left leg motor drift —> 0 = No drift for 5 seconds  6B: Right leg motor drift —> 0 = No drift for 5 seconds  7: Limb Ataxia —> 0 = No ataxia  8: Sensation —> 0 = Normal; no sensory loss  9: Language/aphasia —> 1 = Mild-moderate aphasia: some obvious changes, without significant limitation  10: Dysarthria —> 1 = Mild-moderate dysarthria: slurring but can be understood  11: Extinction/inattention —> 0 = No abnormality    PAST MEDICAL HX  Alcohol abuse    Alcohol withdrawal    Anemia    AFIB atrial fibrillation    CHF (congestive heart failure)    Chronic atrial fibrillation    Chronic CHF    Chronic obstructive pulmonary disease (COPD)    Cirrhosis    Collapsed lung    COPD without exacerbation    Emphysema of lung    Falls    GERD (gastroesophageal reflux disease)    HTN hypertension    Meningitis    Poor historian    Presence of Watchman left atrial appendage closure device.    PAST SURGICAL HX  Presence of Watchman left atrial appendage closure device    S/P BKA (below knee amputation) unilateral, left    Unilateral amputation of lower extremity below knee.    FAMILY HX  Family history unknown: Adopted    SOCIAL HX  lives alone  former smoker  alcohol use last 2-3 days ago (19 Apr 2020 03:19)    PAST MEDICAL & SURGICAL HISTORY:  Chronic CHF  COPD without exacerbation  Atrial fibrillation  Presence of Watchman left atrial appendage closure device  Hypertension  GERD (gastroesophageal reflux disease)  Chronic obstructive pulmonary disease (COPD)  Anemia  Falls  Meningitis  Collapsed lung  Alcohol withdrawal  Emphysema of lung  Cirrhosis  CHF (congestive heart failure)  Poor historian  Alcohol abuse  Chronic atrial fibrillation  Unilateral amputation of lower extremity below knee  Presence of Watchman left atrial appendage closure device  S/P BKA (below knee amputation) unilateral, left    FAMILY HISTORY:  No pertinent family history in first degree relatives  Family history unknown: Adopted     Allergies  Ceclor (Rash)  Duricef (Rash)    MEDICATIONS  (STANDING):  acetaZOLAMIDE    Tablet 125 milliGRAM(s) Oral daily  ALBUTerol    90 MICROgram(s) HFA Inhaler 2 Puff(s) Inhalation every 4 hours  aspirin enteric coated 81 milliGRAM(s) Oral daily  azithromycin  IVPB      azithromycin  IVPB 500 milliGRAM(s) IV Intermittent every 24 hours  budesonide 160 MICROgram(s)/formoterol 4.5 MICROgram(s) Inhaler 2 Puff(s) Inhalation two times a day  clopidogrel Tablet 75 milliGRAM(s) Oral daily  enoxaparin Injectable 40 milliGRAM(s) SubCutaneous every 12 hours  furosemide    Tablet 40 milliGRAM(s) Oral daily  gabapentin 400 milliGRAM(s) Oral three times a day  methylPREDNISolone sodium succinate Injectable 60 milliGRAM(s) IV Push every 6 hours  pantoprazole    Tablet 40 milliGRAM(s) Oral before breakfast  tamsulosin 0.4 milliGRAM(s) Oral at bedtime  tiotropium 18 MICROgram(s) Capsule 1 Capsule(s) Inhalation daily     Review of Symptoms  	Gen: no fevers, chills, sweats, weight loss, fatigue  	Visual: no recent changes in vision, no blurriness, no seeing spots  	Cardiovascular: no chest pain, no palpitations, no orthopnea, no leg swelling  	Respiratory: no shortness of breath, no exertional dyspnea, no cough, no rhinorrhea, no nasal congestion  	GI: no difficulty swallowing, no nausea, no vomiting, no abdominal pain, no diarrhea, no constipation, no melana  	: no dysuria, no increased freq, no hematuria, no malodorous urine  	Derm: no wounds, no rashes  	Heme: no easy bleeding or bruising  	MSK: no joint pain, no joint swelling or redness, no extremity pain               Neuro: no headache, + aphasia and dysarthria / challenges in following 2 step commands. O to year, not to president.    Vital Signs Last 24 Hrs  T(C): 36.7 (22 Apr 2020 16:48), Max: 36.7 (22 Apr 2020 10:51)  T(F): 98 (22 Apr 2020 16:48), Max: 98.1 (22 Apr 2020 10:51)  HR: 107 (22 Apr 2020 16:48) (100 - 115)  BP: 136/76 (22 Apr 2020 16:48) (135/71 - 136/82)  BP(mean): --  RR: 20 (22 Apr 2020 16:48) (20 - 22)  SpO2: 96% (22 Apr 2020 16:48) (93% - 96%)    Labs:                        14.3   8.39  )-----------( 129      ( 22 Apr 2020 08:05 )             44.5     04-22    137  |  101  |  27<H>  ----------------------------<  124<H>  4.2   |  33<H>  |  0.87    Ca    8.6      22 Apr 2020 08:05    TPro  7.0  /  Alb  3.2<L>  /  TBili  0.6  /  DBili  x   /  AST  38<H>  /  ALT  55  /  AlkPhos  95  04-22      Radiology report:  - CT head:  Impression: No acute hemorrhage, mass effect or extra-axial collections.  -  CTA pending results    Physical Exam:  Constitutional: Awake / alert  HEENT: PERRLA, EOMI  Neck: Supple  Respiratory: Breath sounds are clear bilaterally  Cardiovascular: S1 and S2, regular rhythm  Gastrointestinal: Soft, NT/ND  Extremities:  no edema  Vascular: No carotid Bruit  Musculoskeletal: no joint swelling/tenderness, no abnormal movements  Skin: No rashes    Neurological Exam:  NIHS 4   GCS E3,V5,M6  HF: A x O x 2 (2023, Unsure of President, Dysarthric and minor Aphasia with speech),Sleepy but easily arousable to name / light touch.  Pt with aphasia and challenges in Naming /repetition.  ** Pt cannot follow 2 step commands.  CN: PERRL, EOMI, VFF, facial sensation normal, no NLFD, tongue midline  Motor: No pronator drift, Strength 5/5 in all 4 ext, normal bulk and tone, no tremor, rigidity or bradykinesia  Sens: Intact to light touch  Reflexes: Symmetric and normal, downgoing toes b/l  Coord:  No FNFA, dysmetria, ANA intact   Gait/Balance: Cannot test    A/P:  65 year old male w chronic AFib w Watchman device, CHF, COPD, HTN, obesity came to ED w EMS c/o SOB since the morning of 04-18.  Per EMS, pt was in rapid afib in 180s upon EMS arrival with O2 sat in 80s.  HD#4 this evening code stroke for change in MS, speech dysarthria and aphasia / slurred speech.    - TPA to be prepared but on hold until results of CTA and f/u exam of NIHSS.  Pt with hematuria observed this admission with known sizeable renal stones on CT a/p 4/21.   Urology does not believe of any significance.  Urine per Nursing staff no yan blood identified in urinal.  - ASA/ PLavix will continue dual therapy  - pt with Watchmans and recent echo completed significant MR/TR 2+ - typically no cardiogenic cause (Watchmans in place) but still possibility could exist (very unlikely) - will review CTA of neck and head also once complete  - Statin therapy recommend after am Lipid profile obtained  - HbA1C and TFT's  - Dysphagia screen  - DVT prophylaxis  - MRI/A brain-carotids  - Blood glucose bedside 180's, would continue to aim for -180  - neuro checks q 4 hr	  - PT eval  - EKG now and Trop, CK also  - Critical care eval for step down status overnight  - Speech/swallow eval  - d/w Dr. Menon - will re-evaluate pt after return from CTA to see if candidate for TPA intervention.  TPA on hold due to recent hematuria and known kidney stones and further evaluation / follow up of NIHSS.    Total Critical Care Time spent >  62 minutes in evaluating patient, medical record, imaging studies and implementing neuro protective measures to avoid further neurological insult, injury or collapse.  All above recommendations will potentially avoid further stroke progression / expansion / brain injury. Code Stroke Eval:    Patient is a 65y old  Male who presents with a chief complaint of acute hypoxemic, hypercapneic resp failure  COPD exacerbation (22 Apr 2020 14:07)    HPI:  65 year old male w chronic AFib w Watchman device, CHF, COPD, HTN, obesity came to ED w EMS c/o SOB since the morning of 04-18.  Per EMS, pt was in rapid afib in 180s upon EMS arrival with O2 sat in 80s. pt placed on NRB via EMS now with O2 sat of 97.   pt given 125 solumedrol and 30 mg cardizem via EMS.  Pt denies fevers, cough, chest pain. States never experienced similar sx before. Pt reports he is supposed to be on home O2 but isn't and supposed to use CPAP machine at night and isn't.    In ED /85  HR 99  RR 25  T 98.6  97% sat NRB 15 L  given solumedrol 125 mg by ED as well as albuterol x 1. magnesium IV  CXR clear; COVID-19 not detected  ABG revealed hypercapnea x 2; trial of BiPAP    ---------------------------------------------------    4/22 evening/night: Code Stroke called for lethargy, disorientation w/ slurred speech. Pt on Dual therapy (ASA and Plavix) along with Lovenox 40 BID (not full therapeutic dose).  Pt with NIHSS of 4 and sent for CTH/CTA head and neck to eval.  Pt with 's initially repeat was .  Irregular / irregular c/w Afib  (+ watchmans procedure).  Echo with MR/TR 2+.  CT abd / pelvis with abdominal rectus heme 2/2 lovenox dosing.  Also pt noted to have hematuria with significant renal stone identification - this would negate TPA considerations.  Neuroscience team responded to evaluate patient accordingly.    NIHSS: Score of 4  1A: Level of consciousness —> 1 = Arouses to minor stimulation  1B: Ask month and age —> 1 = 1 question right  1C: 'Blink eyes' & 'squeeze hands' —> 0 = Performs both tasks  2: Horizontal extraocular movements —> 0 = Normal  3: Visual fields —> 0 = No visual loss  4: Facial palsy —> 0 = Normal symmetry  5A: Left arm motor drift —> 0 = No drift for 10 seconds  5B: Right arm motor drift —> 0 = No drift for 10 seconds  6A: Left leg motor drift —> 0 = No drift for 5 seconds  6B: Right leg motor drift —> 0 = No drift for 5 seconds  7: Limb Ataxia —> 0 = No ataxia  8: Sensation —> 0 = Normal; no sensory loss  9: Language/aphasia —> 1 = Mild-moderate aphasia: some obvious changes, without significant limitation  10: Dysarthria —> 1 = Mild-moderate dysarthria: slurring but can be understood  11: Extinction/inattention —> 0 = No abnormality    PAST MEDICAL HX  Alcohol abuse    Alcohol withdrawal    Anemia    AFIB atrial fibrillation    CHF (congestive heart failure)    Chronic atrial fibrillation    Chronic CHF    Chronic obstructive pulmonary disease (COPD)    Cirrhosis    Collapsed lung    COPD without exacerbation    Emphysema of lung    Falls    GERD (gastroesophageal reflux disease)    HTN hypertension    Meningitis    Poor historian    Presence of Watchman left atrial appendage closure device.    PAST SURGICAL HX  Presence of Watchman left atrial appendage closure device    S/P BKA (below knee amputation) unilateral, left    Unilateral amputation of lower extremity below knee.    FAMILY HX  Family history unknown: Adopted    SOCIAL HX  lives alone  former smoker  alcohol use last 2-3 days ago (19 Apr 2020 03:19)  pt with abuse history also in past / cirrhosis    PAST MEDICAL & SURGICAL HISTORY:  Chronic CHF  COPD without exacerbation  Atrial fibrillation  Presence of Watchman left atrial appendage closure device  Hypertension  GERD (gastroesophageal reflux disease)  Chronic obstructive pulmonary disease (COPD)  Anemia  Falls  Meningitis  Collapsed lung  Alcohol withdrawal  Emphysema of lung  Cirrhosis  CHF (congestive heart failure)  Poor historian  Alcohol abuse  Chronic atrial fibrillation  Unilateral amputation of lower extremity below knee  Presence of Watchman left atrial appendage closure device  S/P BKA (below knee amputation) unilateral, left    FAMILY HISTORY:  No pertinent family history in first degree relatives  Family history unknown: Adopted     Allergies  Ceclor (Rash)  Duricef (Rash)    MEDICATIONS  (STANDING):  acetaZOLAMIDE    Tablet 125 milliGRAM(s) Oral daily  ALBUTerol    90 MICROgram(s) HFA Inhaler 2 Puff(s) Inhalation every 4 hours  aspirin enteric coated 81 milliGRAM(s) Oral daily  azithromycin  IVPB      azithromycin  IVPB 500 milliGRAM(s) IV Intermittent every 24 hours  budesonide 160 MICROgram(s)/formoterol 4.5 MICROgram(s) Inhaler 2 Puff(s) Inhalation two times a day  clopidogrel Tablet 75 milliGRAM(s) Oral daily  enoxaparin Injectable 40 milliGRAM(s) SubCutaneous every 12 hours  furosemide    Tablet 40 milliGRAM(s) Oral daily  gabapentin 400 milliGRAM(s) Oral three times a day  methylPREDNISolone sodium succinate Injectable 60 milliGRAM(s) IV Push every 6 hours  pantoprazole    Tablet 40 milliGRAM(s) Oral before breakfast  tamsulosin 0.4 milliGRAM(s) Oral at bedtime  tiotropium 18 MICROgram(s) Capsule 1 Capsule(s) Inhalation daily     Review of Symptoms  	Gen: no fevers, chills, sweats, weight loss, fatigue  	Visual: no recent changes in vision, no blurriness, no seeing spots  	Cardiovascular: no chest pain, no palpitations, no orthopnea, no leg swelling  	Respiratory: no shortness of breath, no exertional dyspnea, no cough, no rhinorrhea, no nasal congestion  	GI: no difficulty swallowing, no nausea, no vomiting, no abdominal pain, no diarrhea, no constipation, no melana  	: no dysuria, no increased freq, no hematuria, no malodorous urine  	Derm: no wounds, no rashes  	Heme: no easy bleeding or bruising  	MSK: no joint pain, no joint swelling or redness, no extremity pain               Neuro: no headache, + aphasia and dysarthria / challenges in following 2 step commands. O to year, not to president.    Vital Signs Last 24 Hrs  T(C): 36.7 (22 Apr 2020 16:48), Max: 36.7 (22 Apr 2020 10:51)  T(F): 98 (22 Apr 2020 16:48), Max: 98.1 (22 Apr 2020 10:51)  HR: 107 (22 Apr 2020 16:48) (100 - 115)  BP: 136/76 (22 Apr 2020 16:48) (135/71 - 136/82)  BP(mean): --  RR: 20 (22 Apr 2020 16:48) (20 - 22)  SpO2: 96% (22 Apr 2020 16:48) (93% - 96%)    Labs:                        14.3   8.39  )-----------( 129      ( 22 Apr 2020 08:05 )             44.5     04-22    137  |  101  |  27<H>  ----------------------------<  124<H>  4.2   |  33<H>  |  0.87    Ca    8.6      22 Apr 2020 08:05    TPro  7.0  /  Alb  3.2<L>  /  TBili  0.6  /  DBili  x   /  AST  38<H>  /  ALT  55  /  AlkPhos  95  04-22      Radiology report:  - CT head:  Impression: No acute hemorrhage, mass effect or extra-axial collections.  -  CTA pending results    Physical Exam:  Constitutional: Awake / alert  HEENT: PERRLA, EOMI  Neck: Supple  Respiratory: Breath sounds are clear bilaterally  Cardiovascular: S1 and S2, regular rhythm  Gastrointestinal: Soft, NT/ND  Extremities:  no edema  Vascular: No carotid Bruit  Musculoskeletal: no joint swelling/tenderness, no abnormal movements  Skin: No rashes    Neurological Exam:  NIHS 4   GCS E3,V5,M6  HF: A x O x 2 (2023, Unsure of President, Dysarthric and minor Aphasia with speech),Sleepy but easily arousable to name / light touch.  Pt with aphasia and challenges in Naming /repetition.  ** Pt cannot follow 2 step commands.  CN: PERRL, EOMI, VFF, facial sensation normal, no NLFD, tongue midline  Motor: No pronator drift, Strength 5/5 in all 4 ext, normal bulk and tone, no tremor, rigidity or bradykinesia  Sens: Intact to light touch  Reflexes: Symmetric and normal, downgoing toes b/l  Coord:  No FNFA, dysmetria, ANA intact   Gait/Balance: Cannot test    A/P:  65 year old male w chronic AFib w Watchman device, CHF, COPD, HTN, obesity came to ED w EMS c/o SOB since the morning of 04-18.  Per EMS, pt was in rapid afib in 180s upon EMS arrival with O2 sat in 80s.  HD#4 this evening code stroke for change in MS, speech dysarthria and aphasia / slurred speech.    - TPA to be prepared but on hold until results of CTA and f/u exam of NIHSS.  Pt with hematuria observed this admission with known sizeable renal stones on CT a/p 4/21.   Urology does not believe of any significance.  Urine per Nursing staff no yan blood identified in urinal.  - ASA/ PLavix will continue dual therapy  - pt with Watchmans and recent echo completed significant MR/TR 2+ - typically no cardiogenic cause (Watchmans in place) but still possibility could exist (very unlikely) - will review CTA of neck and head also once complete  - Statin therapy recommend after am Lipid profile obtained  - HbA1C and TFT's  - Hx of EtOH abuse in past / Ascites.  Last drink claim 2-3 days PTA.  Monitor for withdrawal.  - Dysphagia screen  - DVT prophylaxis  - MRI/A brain-carotids  - Blood glucose bedside 180's, would continue to aim for -180  - neuro checks q 4 hr	  - PT eval  - EKG now and Trop, CK also  - Critical care eval for step down status overnight  - Speech/swallow eval  - d/w Dr. Menon - will re-evaluate pt after return from CTA to see if candidate for TPA intervention.  TPA on hold due to recent hematuria and known kidney stones and further evaluation / follow up of NIHSS.  Would also monitor for EtOH withdrawal, as pt with tremulous UEs in holding cup in his hands.  Pt is HD#4.    Total Critical Care Time spent >  62 minutes in evaluating patient, medical record, imaging studies and implementing neuro protective measures to avoid further neurological insult, injury or collapse.  All above recommendations will potentially avoid further stroke progression / expansion / brain injury.

## 2020-04-23 DIAGNOSIS — R41.82 ALTERED MENTAL STATUS, UNSPECIFIED: ICD-10-CM

## 2020-04-23 LAB
ADD ON TEST-SPECIMEN IN LAB: SIGNIFICANT CHANGE UP
AMMONIA BLD-MCNC: 62 UMOL/L — HIGH (ref 11–32)
ANION GAP SERPL CALC-SCNC: 3 MMOL/L — LOW (ref 5–17)
BASE EXCESS BLDA CALC-SCNC: 3.8 MMOL/L — HIGH (ref -2–2)
BASE EXCESS BLDA CALC-SCNC: 6 MMOL/L — HIGH (ref -2–2)
BASOPHILS # BLD AUTO: 0 K/UL — SIGNIFICANT CHANGE UP (ref 0–0.2)
BASOPHILS NFR BLD AUTO: 0 % — SIGNIFICANT CHANGE UP (ref 0–2)
BLOOD GAS COMMENTS ARTERIAL: SIGNIFICANT CHANGE UP
BLOOD GAS COMMENTS ARTERIAL: SIGNIFICANT CHANGE UP
BUN SERPL-MCNC: 40 MG/DL — HIGH (ref 7–23)
CALCIUM SERPL-MCNC: 8.2 MG/DL — LOW (ref 8.5–10.1)
CHLORIDE SERPL-SCNC: 102 MMOL/L — SIGNIFICANT CHANGE UP (ref 96–108)
CO2 SERPL-SCNC: 37 MMOL/L — HIGH (ref 22–31)
CREAT SERPL-MCNC: 0.94 MG/DL — SIGNIFICANT CHANGE UP (ref 0.5–1.3)
EOSINOPHIL # BLD AUTO: 0 K/UL — SIGNIFICANT CHANGE UP (ref 0–0.5)
EOSINOPHIL NFR BLD AUTO: 0 % — SIGNIFICANT CHANGE UP (ref 0–6)
GAS PNL BLDA: SIGNIFICANT CHANGE UP
GAS PNL BLDA: SIGNIFICANT CHANGE UP
GLUCOSE SERPL-MCNC: 134 MG/DL — HIGH (ref 70–99)
HCO3 BLDA-SCNC: 34 MMOL/L — HIGH (ref 21–29)
HCO3 BLDA-SCNC: 34 MMOL/L — HIGH (ref 21–29)
HCT VFR BLD CALC: 48.2 % — SIGNIFICANT CHANGE UP (ref 39–50)
HGB BLD-MCNC: 14.8 G/DL — SIGNIFICANT CHANGE UP (ref 13–17)
LYMPHOCYTES # BLD AUTO: 0.11 K/UL — LOW (ref 1–3.3)
LYMPHOCYTES # BLD AUTO: 1 % — LOW (ref 13–44)
MAGNESIUM SERPL-MCNC: 2.2 MG/DL — SIGNIFICANT CHANGE UP (ref 1.6–2.6)
MCHC RBC-ENTMCNC: 30.7 GM/DL — LOW (ref 32–36)
MCHC RBC-ENTMCNC: 31.8 PG — SIGNIFICANT CHANGE UP (ref 27–34)
MCV RBC AUTO: 103.4 FL — HIGH (ref 80–100)
MONOCYTES # BLD AUTO: 0.45 K/UL — SIGNIFICANT CHANGE UP (ref 0–0.9)
MONOCYTES NFR BLD AUTO: 4 % — SIGNIFICANT CHANGE UP (ref 2–14)
NEUTROPHILS # BLD AUTO: 10.29 K/UL — HIGH (ref 1.8–7.4)
NEUTROPHILS NFR BLD AUTO: 92 % — HIGH (ref 43–77)
NRBC # BLD: SIGNIFICANT CHANGE UP /100 WBCS (ref 0–0)
PCO2 BLDA: 67 MMHG — HIGH (ref 32–46)
PCO2 BLDA: 87 MMHG — CRITICAL HIGH (ref 32–46)
PH BLDA: 7.22 — LOW (ref 7.35–7.45)
PH BLDA: 7.32 — LOW (ref 7.35–7.45)
PHOSPHATE SERPL-MCNC: 6.9 MG/DL — HIGH (ref 2.5–4.5)
PLATELET # BLD AUTO: 136 K/UL — LOW (ref 150–400)
PO2 BLDA: 61 MMHG — LOW (ref 74–108)
PO2 BLDA: 80 MMHG — SIGNIFICANT CHANGE UP (ref 74–108)
POTASSIUM SERPL-MCNC: 4.5 MMOL/L — SIGNIFICANT CHANGE UP (ref 3.5–5.3)
POTASSIUM SERPL-SCNC: 4.5 MMOL/L — SIGNIFICANT CHANGE UP (ref 3.5–5.3)
PROLACTIN SERPL-MCNC: 17.5 NG/ML — SIGNIFICANT CHANGE UP (ref 4.1–18.4)
RBC # BLD: 4.66 M/UL — SIGNIFICANT CHANGE UP (ref 4.2–5.8)
RBC # FLD: 13.3 % — SIGNIFICANT CHANGE UP (ref 10.3–14.5)
SAO2 % BLDA: 90 % — LOW (ref 92–96)
SAO2 % BLDA: 94 % — SIGNIFICANT CHANGE UP (ref 92–96)
SODIUM SERPL-SCNC: 142 MMOL/L — SIGNIFICANT CHANGE UP (ref 135–145)
WBC # BLD: 11.18 K/UL — HIGH (ref 3.8–10.5)
WBC # FLD AUTO: 11.18 K/UL — HIGH (ref 3.8–10.5)

## 2020-04-23 PROCEDURE — 99291 CRITICAL CARE FIRST HOUR: CPT

## 2020-04-23 PROCEDURE — 71045 X-RAY EXAM CHEST 1 VIEW: CPT | Mod: 26

## 2020-04-23 PROCEDURE — 99232 SBSQ HOSP IP/OBS MODERATE 35: CPT

## 2020-04-23 PROCEDURE — 95819 EEG AWAKE AND ASLEEP: CPT | Mod: 26

## 2020-04-23 PROCEDURE — 99233 SBSQ HOSP IP/OBS HIGH 50: CPT

## 2020-04-23 RX ORDER — METOPROLOL TARTRATE 50 MG
5 TABLET ORAL ONCE
Refills: 0 | Status: COMPLETED | OUTPATIENT
Start: 2020-04-23 | End: 2020-04-23

## 2020-04-23 RX ORDER — PANTOPRAZOLE SODIUM 20 MG/1
40 TABLET, DELAYED RELEASE ORAL DAILY
Refills: 0 | Status: DISCONTINUED | OUTPATIENT
Start: 2020-04-23 | End: 2020-04-28

## 2020-04-23 RX ORDER — CHLORHEXIDINE GLUCONATE 213 G/1000ML
15 SOLUTION TOPICAL EVERY 12 HOURS
Refills: 0 | Status: DISCONTINUED | OUTPATIENT
Start: 2020-04-23 | End: 2020-04-26

## 2020-04-23 RX ORDER — PROPOFOL 10 MG/ML
5 INJECTION, EMULSION INTRAVENOUS
Qty: 1000 | Refills: 0 | Status: DISCONTINUED | OUTPATIENT
Start: 2020-04-23 | End: 2020-04-26

## 2020-04-23 RX ORDER — LEVETIRACETAM 250 MG/1
500 TABLET, FILM COATED ORAL ONCE
Refills: 0 | Status: COMPLETED | OUTPATIENT
Start: 2020-04-23 | End: 2020-04-23

## 2020-04-23 RX ORDER — DILTIAZEM HCL 120 MG
60 CAPSULE, EXT RELEASE 24 HR ORAL EVERY 6 HOURS
Refills: 0 | Status: DISCONTINUED | OUTPATIENT
Start: 2020-04-23 | End: 2020-04-27

## 2020-04-23 RX ADMIN — Medication 2 MILLIGRAM(S): at 11:50

## 2020-04-23 RX ADMIN — AZITHROMYCIN 255 MILLIGRAM(S): 500 TABLET, FILM COATED ORAL at 19:01

## 2020-04-23 RX ADMIN — Medication 60 MILLIGRAM(S): at 22:13

## 2020-04-23 RX ADMIN — Medication 60 MILLIGRAM(S): at 23:34

## 2020-04-23 RX ADMIN — GABAPENTIN 400 MILLIGRAM(S): 400 CAPSULE ORAL at 21:50

## 2020-04-23 RX ADMIN — Medication 2 MILLIGRAM(S): at 21:51

## 2020-04-23 RX ADMIN — ENOXAPARIN SODIUM 40 MILLIGRAM(S): 100 INJECTION SUBCUTANEOUS at 21:51

## 2020-04-23 RX ADMIN — Medication 60 MILLIGRAM(S): at 19:00

## 2020-04-23 RX ADMIN — PANTOPRAZOLE SODIUM 40 MILLIGRAM(S): 20 TABLET, DELAYED RELEASE ORAL at 11:50

## 2020-04-23 RX ADMIN — CHLORHEXIDINE GLUCONATE 15 MILLILITER(S): 213 SOLUTION TOPICAL at 19:00

## 2020-04-23 RX ADMIN — Medication 81 MILLIGRAM(S): at 15:25

## 2020-04-23 RX ADMIN — LEVETIRACETAM 400 MILLIGRAM(S): 250 TABLET, FILM COATED ORAL at 09:29

## 2020-04-23 RX ADMIN — Medication 5 MILLIGRAM(S): at 03:00

## 2020-04-23 RX ADMIN — Medication 60 MILLIGRAM(S): at 11:49

## 2020-04-23 RX ADMIN — PROPOFOL 3.54 MICROGRAM(S)/KG/MIN: 10 INJECTION, EMULSION INTRAVENOUS at 22:14

## 2020-04-23 RX ADMIN — ACETAZOLAMIDE 125 MILLIGRAM(S): 250 TABLET ORAL at 19:01

## 2020-04-23 RX ADMIN — Medication 2 MILLIGRAM(S): at 03:03

## 2020-04-23 RX ADMIN — Medication 2 MILLIGRAM(S): at 15:27

## 2020-04-23 RX ADMIN — Medication 60 MILLIGRAM(S): at 06:48

## 2020-04-23 RX ADMIN — PROPOFOL 3.54 MICROGRAM(S)/KG/MIN: 10 INJECTION, EMULSION INTRAVENOUS at 09:29

## 2020-04-23 RX ADMIN — ENOXAPARIN SODIUM 40 MILLIGRAM(S): 100 INJECTION SUBCUTANEOUS at 11:50

## 2020-04-23 RX ADMIN — CLOPIDOGREL BISULFATE 75 MILLIGRAM(S): 75 TABLET, FILM COATED ORAL at 15:27

## 2020-04-23 RX ADMIN — Medication 2 MILLIGRAM(S): at 19:00

## 2020-04-23 RX ADMIN — Medication 2 MILLIGRAM(S): at 01:48

## 2020-04-23 RX ADMIN — GABAPENTIN 400 MILLIGRAM(S): 400 CAPSULE ORAL at 15:26

## 2020-04-23 RX ADMIN — Medication 40 MILLIGRAM(S): at 15:26

## 2020-04-23 NOTE — PROGRESS NOTE ADULT - ASSESSMENT
65 M with multiple comorbidities including CAD, CHF and AFib, currently on Lovenox 40 BID, Plavix and ASA daily, found to have a 9.8cm x 1.6cm subacute hematoma in the right abdominal wall rectus abdominis muscle along anterior sheath. Overall stable, no expansion noted that would suggest active hemorrhage.     -no further H/H trend needed, stable in past few days (Hb ~14)  -can do ice compress for comfort care  -consider VTE injection sites in a different area  -no contraindication to antiplatelet therapy at this time  -no surgical intervention indicated at this time  -thank you for the consult    Discussed with surgery attending, Dr Pereira

## 2020-04-23 NOTE — PROGRESS NOTE ADULT - SUBJECTIVE AND OBJECTIVE BOX
Patient is a 65y old  Male who presents with a chief complaint of acute hypoxemic, hypercapneic resp failure  COPD exacerbation (23 Apr 2020 14:28)      BRIEF HOSPITAL COURSE: 64 y/o male pmhx  chronic Afib s/p Watchman device ( 4/19), HTN, prior CVAs, ETOH abuse, smoker, COPD, HFpEF, Cirrhosis, s/p L BKA admitted on 4/18 w/ acute COPD exacerbation. Hospital course complicated by ETOH withdrawal requiring IV Ativan. Pt was RRT early this am due AMS and respiratory distress.  CT head done negative for any acute pathology. ABG done w/ hypercapnic respiratory failure .He was intubated by Anesthesia    Tonight able to wean down FiO2 to 40% and PEEP5. Deeply sedated on propofol.   Lasix changed to 40mg PO daily.         PAST MEDICAL & SURGICAL HISTORY:  Chronic CHF  COPD without exacerbation  Atrial fibrillation  Presence of Watchman left atrial appendage closure device  Hypertension  GERD (gastroesophageal reflux disease)  Chronic obstructive pulmonary disease (COPD)  Anemia  Falls  Meningitis  Collapsed lung  Alcohol withdrawal  Emphysema of lung  Cirrhosis  CHF (congestive heart failure)  Poor historian  Alcohol abuse  Chronic atrial fibrillation  Unilateral amputation of lower extremity below knee  Presence of Watchman left atrial appendage closure device  S/P BKA (below knee amputation) unilateral, left      Review of Systems:  Unable to obtain due to clinical condition     Medications:  azithromycin  IVPB      azithromycin  IVPB 500 milliGRAM(s) IV Intermittent every 24 hours  acetaZOLAMIDE    Tablet 125 milliGRAM(s) Oral daily  diltiazem    Tablet 60 milliGRAM(s) Enteral Tube every 6 hours  ALBUTerol    90 MICROgram(s) HFA Inhaler 2 Puff(s) Inhalation every 4 hours  budesonide 160 MICROgram(s)/formoterol 4.5 MICROgram(s) Inhaler 2 Puff(s) Inhalation two times a day  tiotropium 18 MICROgram(s) Capsule 1 Capsule(s) Inhalation daily  gabapentin 400 milliGRAM(s) Oral three times a day  LORazepam   Injectable 2 milliGRAM(s) IV Push every 4 hours  propofol Infusion 5 MICROgram(s)/kG/Min IV Continuous <Continuous>  traZODone 100 milliGRAM(s) Oral at bedtime SD  aspirin enteric coated 81 milliGRAM(s) Oral daily  clopidogrel Tablet 75 milliGRAM(s) Oral daily  enoxaparin Injectable 40 milliGRAM(s) SubCutaneous every 12 hours  pantoprazole  Injectable 40 milliGRAM(s) IV Push daily  methylPREDNISolone sodium succinate Injectable 60 milliGRAM(s) IV Push every 6 hours        chlorhexidine 0.12% Liquid 15 milliLiter(s) Oral Mucosa every 12 hours        Mode: AC/ CMV (Assist Control/ Continuous Mandatory Ventilation)  RR (machine): 12  TV (machine): 550  FiO2: 50  PEEP: 5  ITime: 1.2  PIP: 24      ICU Vital Signs Last 24 Hrs  T(C): 36.5 (23 Apr 2020 23:42), Max: 37.1 (23 Apr 2020 02:54)  T(F): 97.7 (23 Apr 2020 23:42), Max: 98.7 (23 Apr 2020 02:54)  HR: 88 (24 Apr 2020 00:30) (78 - 109)  BP: 90/65 (24 Apr 2020 00:30) (90/62 - 138/73)  BP(mean): 69 (24 Apr 2020 00:30) (65 - 80)  RR: 22 (24 Apr 2020 00:30) (12 - 33)  SpO2: 96% (24 Apr 2020 00:30) (92% - 99%)      ABG - ( 23 Apr 2020 12:32 )  pH, Arterial: 7.32  pH, Blood: x     /  pCO2: 67    /  pO2: 61    / HCO3: 34    / Base Excess: 6.0   /  SaO2: 90                  I&O's Detail    23 Apr 2020 07:01  -  24 Apr 2020 01:11  --------------------------------------------------------  IN:    propofol Infusion: 97 mL  Total IN: 97 mL    OUT:  Total OUT: 0 mL    Total NET: 97 mL            LABS:                        14.8   11.18 )-----------( 136      ( 23 Apr 2020 07:51 )             48.2     04-23    142  |  102  |  40<H>  ----------------------------<  134<H>  4.5   |  37<H>  |  0.94    Ca    8.2<L>      23 Apr 2020 14:38  Phos  6.9     04-23  Mg     2.2     04-23    TPro  7.2  /  Alb  3.5  /  TBili  0.8  /  DBili  0.3<H>  /  AST  40<H>  /  ALT  60  /  AlkPhos  93  04-23      CARDIAC MARKERS ( 23 Apr 2020 07:51 )  x     / x     / 464 U/L / x     / x          CAPILLARY BLOOD GLUCOSE      POCT Blood Glucose.: 142 mg/dL (23 Apr 2020 07:10)        CULTURES:  Culture Results:   <10,000 CFU/mL Normal Urogenital Faith (04-21-20 @ 15:51)  Culture Results:   No Growth Final (04-18-20 @ 19:43)      Physical Examination:    General:  No acute distress.     HEENT: Pupils equal, reactive to light.  Symmetric.    PULM: Diminished breath sounds B/L. end expiratory wheeze B/L.     CVS: Irregular rate. + S1/S2.     ABD: Soft, nondistended, nontender, normoactive bowel sounds, no masses    EXT: s/p L BKA. R lower extremity edema.     SKIN: Warm and well perfused, no rashes noted.    NEURO: Intubated/ Sedated. Withdraws to painful stimuli.     RADIOLOGY: < from: CT Angio Head w/ IV Cont (04.22.20 @ 21:58) >  EXAM:  CT ANGIO NECK (W)AW IC                          EXAM:  CT ANGIO BRAIN (W)AW IC                            PROCEDURE DATE:  04/22/2020          INTERPRETATION:  CT ANGIOGRAPHY OF THE HEAD AND NECK    HISTORY: 65 years old. Male. acute CVA expected.    COMPARISON: Noncontrast CT head 4/22/2020.    TECHNIQUE: CT angiographic evaluation of the head and neck was performed consisting of contiguous axial sections scanned from the vertex to the level of the aortic arch following the intravenous administration of iodinated contrast. The acquired data was post-processed to generate 3D/MIP coronal, and sagittal reformats of the head and neck arterial vasculature. Vascular stenosis is measured by NASCET criteria comparing the narrowest segment with the distal luminal diameter as related to the reported measure of arterial narrowing.  90 cc Omnipaque 350 was administered.     FINDINGS:  The proximal great vessels are not well evaluated due to streak artifact.    Great vessel origins are patent. Innominate and visualized subclavian arteries are patent.    Anterior circulation:  The common carotid arteries are patent without significant stenosis.    Mild to moderate irregular right and mild left atherosclerotic calcifications along the carotid bifurcations without significant stenosis. The internal carotid arteries are patent without significant stenosis. Dense atherosclerotic calcifications along the bilateral petrous, precavernous and cavernous segments without significant stenosis.    The anterior and middle cerebral arteries are patent without significant stenosis.    The right posterior communicating artery is seen.    Posterior circulation:   Bilateral vertebral arteries are unremarkable from their origins to their intracranial segments.    The basilar artery is patent without significant stenosis. The posterior cerebral arteries are patent without significant stenosis.     Other:  No enlarged cervical adenopathy by size criteria.    The visualized lung apices are clear.    Multilevel degenerative changes of the cervical spine are noted.    IMPRESSION:  Patent cervical and intracranial major arterial vasculature without evidence of abrupt vessel cut off, hemodynamically significant stenosis, aneurysm, or dissection.    < end of copied text >      CRITICAL CARE TIME SPENT: 34 min  Evaluating/treating patient, reviewing data/labs/imaging, discussing case with multidisciplinary team, discussing plan/goals of care with patient/family. Non-inclusive of procedure time.

## 2020-04-23 NOTE — PROGRESS NOTE ADULT - ASSESSMENT
A/P: 65 male with acute respiratory failure from altered awareness and sedation  COPD  AFIB s/p watchman  CHF  HTN  ETOH abuse      Plan:   CICU      PULM: No weaning today, ET tube was advanced, BPDs, Steroids    Cardio: BP stable, continue ASA, Plavix, Cardizem    Renal: Repeat BMP because of hyperkalemia    GI: NPO, PPI    PSY: Ativan ATC, Thiamine for chronic ETOH abuse and thiamine deficiency folate    Lovenox DVT Prophylaxis

## 2020-04-23 NOTE — CONSULT NOTE ADULT - SUBJECTIVE AND OBJECTIVE BOX
Patient is a 65y old  Male who presents with a chief complaint of acute hypoxemic, hypercapneic resp failure  COPD exacerbation \      HPI:  65 year old male w chronic AFib w Watchman device, CHF, COPD, HTN, obesity came to ED w EMS c/o SOB since the morning of 04-18 .per EMS, pt was in rapid afib in 180s upon EMS arrival with O2 sat in 80s. pt placed on NRB via EMS now with O2 sat of 97.  pt given 125 solumedrol and 30 mg cardizem via EMS. Seen By NS PA last night  code stroke For acute AMS and slurred speech which later improved NIHSS scores 4 improved to1 and CT/ CTA brain reported neg and started on CIWA protocol and this morning found to be in Resp failure and intubated . Currently sedated and responds to stimuli.          PAST MEDICAL HX  Alcohol abuse    Alcohol withdrawal    Anemia    AFIB atrial fibrillation    CHF (congestive heart failure)    Chronic atrial fibrillation    Chronic CHF    Chronic obstructive pulmonary disease (COPD)    Cirrhosis    Collapsed lung    COPD without exacerbation    Emphysema of lung    Falls    GERD (gastroesophageal reflux disease)    HTN hypertension    Meningitis    Poor historian    Presence of Watchman left atrial appendage closure device.    PAST SURGICAL HX  Presence of Watchman left atrial appendage closure device    S/P BKA (below knee amputation) unilateral, left    Unilateral amputation of lower extremity below knee.      FAMILY HX  Family history unknown: Adopted      SOCIAL HX  lives alone  former smoker  alcohol use last 2-3 days ago (19 Apr 2020 03:19)      PAST MEDICAL & SURGICAL HISTORY:  Chronic CHF  COPD without exacerbation  Atrial fibrillation  Presence of Watchman left atrial appendage closure device  Hypertension  GERD (gastroesophageal reflux disease)  Chronic obstructive pulmonary disease (COPD)  Anemia  Falls  Meningitis  Collapsed lung  Alcohol withdrawal  Emphysema of lung  Cirrhosis  CHF (congestive heart failure)  Poor historian  Alcohol abuse  Chronic atrial fibrillation  Unilateral amputation of lower extremity below knee  Presence of Watchman left atrial appendage closure device  S/P BKA (below knee amputation) unilateral, left      FAMILY HISTORY:  No pertinent family history in first degree relatives  Family history unknown: Adopted      Social Hx:  Nonsmoker, no drug or alcohol use    MEDICATIONS  (STANDING):  acetaZOLAMIDE    Tablet 125 milliGRAM(s) Oral daily  ALBUTerol    90 MICROgram(s) HFA Inhaler 2 Puff(s) Inhalation every 4 hours  aspirin enteric coated 81 milliGRAM(s) Oral daily  azithromycin  IVPB      azithromycin  IVPB 500 milliGRAM(s) IV Intermittent every 24 hours  budesonide 160 MICROgram(s)/formoterol 4.5 MICROgram(s) Inhaler 2 Puff(s) Inhalation two times a day  chlorhexidine 0.12% Liquid 15 milliLiter(s) Oral Mucosa every 12 hours  clopidogrel Tablet 75 milliGRAM(s) Oral daily  diltiazem    Tablet 60 milliGRAM(s) Enteral Tube every 6 hours  enoxaparin Injectable 40 milliGRAM(s) SubCutaneous every 12 hours  furosemide    Tablet 40 milliGRAM(s) Oral daily  gabapentin 400 milliGRAM(s) Oral three times a day  LORazepam   Injectable 2 milliGRAM(s) IV Push every 4 hours  methylPREDNISolone sodium succinate Injectable 60 milliGRAM(s) IV Push every 6 hours  pantoprazole  Injectable 40 milliGRAM(s) IV Push daily  propofol Infusion 5 MICROgram(s)/kG/Min (3.54 mL/Hr) IV Continuous <Continuous>  tiotropium 18 MICROgram(s) Capsule 1 Capsule(s) Inhalation daily       Allergies    Ceclor (Rash)  Duricef (Rash)    ROS: Pertinent positives in HPI, all other ROS were reviewed and are negative.      Vital Signs Last 24 Hrs  T(C): 36.7 (23 Apr 2020 06:52), Max: 37.1 (23 Apr 2020 02:54)  T(F): 98.1 (23 Apr 2020 06:52), Max: 98.7 (23 Apr 2020 02:54)  HR: 88 (23 Apr 2020 06:52) (88 - 115)  BP: 118/45 (23 Apr 2020 06:52) (118/45 - 138/73)  BP(mean): --  RR: 18 (23 Apr 2020 06:52) (18 - 22)  SpO2: 93% (23 Apr 2020 06:52) (92% - 97%)        Constitutional: On Vent, sedated, unresponsive to commands  HEENT: PERRLA, EOMI,   Neck: Supple.  Respiratory: Breath sounds are clear bilaterally  Cardiovascular: S1 and S2,  irregular rhythm  Gastrointestinal: soft, nontender  Extremities:  no edema  Vascular:  Carotid Bruit - no  Musculoskeletal: no joint swelling/tenderness   Skin: No rashes    Neurological exam:  HF: Unresponsive, on vent, sedated, on propofol.    CN: PRATIMA, EOMI, Gag not tested  Motor: Sedated limited exam. Moves UE to stimuli LLE Amputated RT LE no response    Sens: responds to stimuli moves BUE  Reflexes: Symmetric +1 BUE  No response RT LE LLE amputee  Coord:  can not test  Gait/Balance: Cannot test    Labs:   04-23    139  |  101  |  40<H>  ----------------------------<  166<H>  5.5<H>   |  35<H>  |  1.13    Ca    8.3<L>      23 Apr 2020 07:51  Phos  6.9     04-23  Mg     2.2     04-23    TPro  7.2  /  Alb  3.5  /  TBili  0.8  /  DBili  0.3<H>  /  AST  40<H>  /  ALT  60  /  AlkPhos  93  04-23                              14.8   11.18 )-----------( 136      ( 23 Apr 2020 07:51 )             48.2       Radiology:  - CT Head:    < from: CT Brain Stroke Protocol (04.22.20 @ 20:29) >  Impression:    No acute hemorrhage, mass effect or extra-axial collections.      < from: CT Angio Neck w/ IV Cont (04.22.20 @ 21:58) >  IMPRESSION:  Patent cervical and intracranial major arterial vasculature without evidence of abrupt vessel cut off, hemodynamically significant stenosis, aneurysm, or dissection.

## 2020-04-23 NOTE — PROGRESS NOTE ADULT - PROBLEM SELECTOR PLAN 2
HR mildly elevated; change Cardizem to short-acting formulation to allow OGT administration; he is not anticoagulated for AF and is s/p past Watchman device placement.

## 2020-04-23 NOTE — AIRWAY PLACEMENT NOTE ADULT - AIRWAY COMMENTS:
upon opening the mouth, dry blood visualized on the tongue and mucous seen on the glidescope screen as blade positioned for intubation

## 2020-04-23 NOTE — PROGRESS NOTE ADULT - SUBJECTIVE AND OBJECTIVE BOX
REASON FOR VISIT: AF, Rx management    HPI:  65 year old man with a history of chronic AF, Watchman device (implanted 4/2019), HTN, CVAs, alcoholism, tobacco abuse, COPD, HFpEF (60% 12/2018), GERD, cirrhosis, left BKA following traumatic injury admitted on 4/18/2020 with  acute on chronic hypercapnic & hypoxemic respiratory failure due to suspected exacerbation of COPD and/or bronchitis.    4/21/20 Patient complain of wheezing ,no sob at rest , on 4 L NC O2  despite of being on IV lasix ,  heart rate is 80 to 100s    4/22/2020:  "I'm still wheezing."  No new complaints.  4/23/2020:  Overnight events noted and case discussed with Dr Crews; sedated on vent in CICU.    MEDICATIONS  (STANDING):  acetaZOLAMIDE    Tablet 125 milliGRAM(s) Oral daily  ALBUTerol    90 MICROgram(s) HFA Inhaler 2 Puff(s) Inhalation every 4 hours  aspirin enteric coated 81 milliGRAM(s) Oral daily  azithromycin  IVPB 500 milliGRAM(s) IV Intermittent every 24 hours  budesonide 160 MICROgram(s)/formoterol 4.5 MICROgram(s) Inhaler 2 Puff(s) Inhalation two times a day  chlorhexidine 0.12% Liquid 15 milliLiter(s) Oral Mucosa every 12 hours  clopidogrel Tablet 75 milliGRAM(s) Oral daily  diltiazem    milliGRAM(s) Oral daily  enoxaparin Injectable 40 milliGRAM(s) SubCutaneous every 12 hours  furosemide    Tablet 40 milliGRAM(s) Oral daily  gabapentin 400 milliGRAM(s) Oral three times a day  levETIRAcetam  IVPB 500 milliGRAM(s) IV Intermittent once  LORazepam   Injectable 2 milliGRAM(s) IV Push every 4 hours  methylPREDNISolone sodium succinate Injectable 60 milliGRAM(s) IV Push every 6 hours  pantoprazole  Injectable 40 milliGRAM(s) IV Push daily  propofol Infusion 5 MICROgram(s)/kG/Min (3.54 mL/Hr) IV Continuous <Continuous>  tiotropium 18 MICROgram(s) Capsule 1 Capsule(s) Inhalation daily    Vital Signs Last 24 Hrs  T(C): 36.7 (23 Apr 2020 06:52), Max: 37.1 (23 Apr 2020 02:54)  T(F): 98.1 (23 Apr 2020 06:52), Max: 98.7 (23 Apr 2020 02:54)  HR: 88 (23 Apr 2020 06:52) (88 - 115)  BP: 118/45 (23 Apr 2020 06:52) (118/45 - 138/73)  RR: 18 (23 Apr 2020 06:52) (18 - 22)  SpO2: 93% (23 Apr 2020 06:52) (92% - 97%)    PHYSICAL EXAM:  Constitutional: Semirecumbent in bed, sedated   Respiratory: Scattered rhonchi, ETT to vent  Cardiovascular: Irregular, rate ~ 100 bpm at time of encounter  Gastrointestinal: Abdomen is soft, obese, nontender.   Extremities:  + leg edema (bilateral) with stasis erythema; s/p LLE amputation    LABS:                14.8   11.18 )-----------( 136      ( 23 Apr 2020 07:51 )             48.2     139  |  101  |  40<H>  ----------------------------<  166<H>  5.5<H>   |  35<H>  |  1.13    Transthoracic Echocardiogram (06.10.19 @ 20:49):   Estimated left ventricular ejection fraction is 60-65 %. The left ventricle is normal in wall thickness, wall motion and contractility as seen in limited views.   The left ventricle cavity is moderately dilated.   The left atrium is severely dilated.   The right atrium appears severely dilated.   Normal appearing right ventricle structure and function.   Moderate (2+) mitral regurgitation.   Mild to Moderate Tricuspid regurgitation.     US Duplex Venous Lower Ext Complete, Bilateral (04.19.20 @ 16:17):  No evidence of deep venous thrombosis in either lower extremity.    Tele: AF    CT Brain Stroke Protocol (04.22.20 @ 20:29):  No acute hemorrhage, mass effect or extra-axial collections.    CT Abdomen and Pelvis w/wo IV Cont (04.22.20 @ 09:09) >  1.  3 mm nonobstructing stone in the left ureterovesicular junction.  2.  7 mm nonobstructing stone in the lower pole right kidney.  3.  Subacute hemorrhage in the right rectus abdominis along the anterior sheath, measures 1.6 cm in thickness and extends from the umbilicus to the inferior myotendinous junction.  4.  Cirrhosis. Mild splenomegaly is new from the prior and suggestive of portal venous hypertension.

## 2020-04-23 NOTE — PROGRESS NOTE ADULT - SUBJECTIVE AND OBJECTIVE BOX
Surgery consulted for a right abdominal wall hematoma found on CT A/P imaging. Patient was seen and reevaluated this am by surgery team. Patient is doing wel and denies any abdominal discomfort. Denies any acute event overnight.    HPI:  65 year old male with PMHx HFPEF (60% 12/2018), EtOH abuse, A-Fib s/p watchman procedure on 4/2/19, anemia, CHF, COPD, GERD, cirrhosis, HTN, s/p left BKA (18 years ago, traumatic injury), morbid obesity, came to ED with c/o SOB since the morning of 04-18 which got worse. Patient did have fever for two days with cough upon arrival. Upon arrival in ED, pt was in rapid afib in 180s upon EMS arrival with O2 sat in 80s. Pt placed on NRB via EMS now with O2 sat of 97. Pt was given 125 solumedrol and 30 mg cardizem via EMS.    PHYSICAL EXAM:    Vital Signs Last 24 Hrs  T(C): 36.7 (23 Apr 2020 06:52), Max: 37.1 (23 Apr 2020 02:54)  T(F): 98.1 (23 Apr 2020 06:52), Max: 98.7 (23 Apr 2020 02:54)  HR: 88 (23 Apr 2020 06:52) (88 - 115)  BP: 118/45 (23 Apr 2020 06:52) (118/45 - 138/73)  BP(mean): --  RR: 18 (23 Apr 2020 06:52) (18 - 22)  SpO2: 93% (23 Apr 2020 06:52) (92% - 97%)    Constitutional: NAD, awake and alert, obese   HEENT: atraumatic, normocephalic, PERRLA, EOMI  Neck:  supple,  No JVD  Chest: nontender, no echymmosis noted  Cardiovascular: S1 and S2, irregular rate and rhythm   Gastrointestinal: soft, large abd girth, nontender, bowel sounds present, three ecchymosis present on RLE abdomen at VTE injection sites  Extremities:  peripheral edema right LE, Left BKA   Vascular:  RLE feeble, nonpalpable pulses   Neurological: A/O x 3, no focal deficits  Musculoskeletal: no calf tenderness, LLE BKA  Skin: RLE stasis mild erythema    LABS:                                    14.8   11.18 )-----------( 136      ( 23 Apr 2020 07:51 )             48.2     8.39  )-----------( 129      ( 22 Apr 2020 08:05 )             44.5   04-22 04-22    137  |  101  |  27<H>  ----------------------------<  124<H>  4.2   |  33<H>  |  0.87    Ca    8.6      22 Apr 2020 08:05    TPro  7.0  /  Alb  3.2<L>  /  TBili  0.6  /  DBili  x   /  AST  38<H>  /  ALT  55  /  AlkPhos  95  04-22    137  |  101  |  27<H>  ----------------------------<  124<H>  4.2   |  33<H>  |  0.87    Ca    8.6      22 Apr 2020 08:05    TPro  7.0  /  Alb  3.2<L>  /  TBili  0.6  /  DBili  x   /  AST  38<H>  /  ALT  55  /  AlkPhos  95  04-22      RADIOLOGY:    < from: CT Abdomen and Pelvis w/wo IV Cont (04.22.20 @ 09:09) >  Abdominal Wall: Soft tissue density and thickening of the right rectus abdominis along the anterior sheath, from the level of the umbilicus to the inferior myotendinous junction, suspicious for hemorrhage and estimated to measure 9.8 cm transverse by 1.6 cm AP. Fat-containing umbilical hernia.      1.  3 mm nonobstructing stone in the left ureterovesicular junction.  2.  7 mm nonobstructing stone in the lower pole right kidney.  3.  Subacute hemorrhage in the right rectus abdominis along the anterior sheath, measures 1.6 cm in thickness and extends from the umbilicus to the inferior myotendinous junction.  4.   Cirrhosis. Mild splenomegaly is new from the prior and suggestive of portal venous hypertension.    < end of copied text >

## 2020-04-23 NOTE — PROGRESS NOTE ADULT - ASSESSMENT
66 y/o male pmhx  chronic Afib s/p Watchman device ( 4/19), HTN, prior CVAs, ETOH abuse, smoker, COPD, HFpEF, Cirrhosis, s/p L BKA admitted on 4/18 w/ acute COPD exacerbation.  Complicated by acute hypercapnic respiratory failure, AMS requiring intubation.     Plan:  Neuro: Encephalopathy, due to hypercapnia. CT head/ CTA head negative for any acute pathology.   - ETOH withdrawal,  Ativan 2mg q4hr while intubated.  Added Thiamine/ MV/ Folate daily.   CV: Afib, s/p Watchmen. Rate controlled. Continue w/ Cardizem 60mg q6hr.   Resp: Acute hypercapnic respiratory failure, due to COPD exacerbation and respiratory drive depression from benzos. Titrating FIO2 to maintain O2 sat >88%. Avoid hyperoxia in COPD patient. Repeat ABG in am. Solumedrol 60mg q8hr. Albuterol MDI q4hr. Plan for SBT in am. If AM ABG improved will bridge off propofol w/ Precedex for SBT/ anticipated extubation.  Vent bundle reviewed.   GI: PPI daily. If unable to extubate in am, consider starting feeds  Renal: Serum CO2 elevated,  multifactorial from chronic CO2 retaining and contraction alkalosis from diuresis. Lasix de escalated to 40mg PO daily. Diamox PO Daily. Net Negative on I&Os.  ID: Azithromycin x 5 days for COPD exacerbation COVID Negative.      Lovenox qd DVT ppx

## 2020-04-23 NOTE — PROGRESS NOTE ADULT - SUBJECTIVE AND OBJECTIVE BOX
Subjective:    pat RR today, with a hx of alcohol abuse and started in CIWA protocol yesterday, received 6 mg IV ativan overnight. pat intubated for lathergy & worsening mental status. ABG post-intubation ABG - ( 23 Apr 2020 12:32 )  pH, Arterial: 7.32  pH, Blood: x     /  pCO2: 67    /  pO2: 61    / HCO3: 34    / Base Excess: 6.0   /  SaO2: 90        Home Medications:  albuterol 2.5 mg/3 mL (0.083%) inhalation solution: 3 milliliter(s) inhaled every 6 hours, As Needed -for shortness of breath and/or wheezing (19 Apr 2020 03:12)  gabapentin 400 mg oral capsule: 1 cap(s) orally 3 times a day (19 Apr 2020 03:12)  pantoprazole 40 mg oral granule, enteric coated: 40 milligram(s) orally once a day (19 Apr 2020 03:12)  Spiriva 18 mcg inhalation capsule: 1 cap(s) inhaled once a day (19 Apr 2020 03:12)  tamsulosin 0.4 mg oral capsule: 1 cap(s) orally once a day (at bedtime) (19 Apr 2020 03:12)  traZODone 50 mg oral tablet: 2 tab(s) orally once a day (at bedtime), As Needed (19 Apr 2020 03:12)    MEDICATIONS  (STANDING):  acetaZOLAMIDE    Tablet 125 milliGRAM(s) Oral daily  ALBUTerol    90 MICROgram(s) HFA Inhaler 2 Puff(s) Inhalation every 4 hours  aspirin enteric coated 81 milliGRAM(s) Oral daily  azithromycin  IVPB      azithromycin  IVPB 500 milliGRAM(s) IV Intermittent every 24 hours  budesonide 160 MICROgram(s)/formoterol 4.5 MICROgram(s) Inhaler 2 Puff(s) Inhalation two times a day  chlorhexidine 0.12% Liquid 15 milliLiter(s) Oral Mucosa every 12 hours  clopidogrel Tablet 75 milliGRAM(s) Oral daily  diltiazem    Tablet 60 milliGRAM(s) Enteral Tube every 6 hours  enoxaparin Injectable 40 milliGRAM(s) SubCutaneous every 12 hours  furosemide    Tablet 40 milliGRAM(s) Oral daily  gabapentin 400 milliGRAM(s) Oral three times a day  LORazepam   Injectable 2 milliGRAM(s) IV Push every 4 hours  methylPREDNISolone sodium succinate Injectable 60 milliGRAM(s) IV Push every 6 hours  pantoprazole  Injectable 40 milliGRAM(s) IV Push daily  propofol Infusion 5 MICROgram(s)/kG/Min (3.54 mL/Hr) IV Continuous <Continuous>  tiotropium 18 MICROgram(s) Capsule 1 Capsule(s) Inhalation daily    MEDICATIONS  (PRN):  traZODone 100 milliGRAM(s) Oral at bedtime PRN insomnia      Allergies    Ceclor (Rash)  Duricef (Rash)    Intolerances        Vital Signs Last 24 Hrs  T(C): 36.4 (23 Apr 2020 09:00), Max: 37.1 (23 Apr 2020 02:54)  T(F): 97.6 (23 Apr 2020 09:00), Max: 98.7 (23 Apr 2020 02:54)  HR: 89 (23 Apr 2020 14:12) (88 - 109)  BP: 106/58 (23 Apr 2020 10:00) (106/58 - 138/73)  BP(mean): 69 (23 Apr 2020 10:00) (69 - 74)  RR: 20 (23 Apr 2020 10:00) (18 - 20)  SpO2: 96% (23 Apr 2020 14:12) (92% - 99%)  Mode: AC/ CMV (Assist Control/ Continuous Mandatory Ventilation)  RR (machine): 12  TV (machine): 550  FiO2: 50  PEEP: 5  ITime: 1.2  PIP: 26      PHYSICAL EXAMINATION:    NECK:  Supple. No lymphadenopathy. Jugular venous pressure not elevated. Carotids equal.   HEART:   The cardiac impulse has a normal quality. Reg., Nl S1 and S2.  There are no murmurs, rubs or gallops noted  CHEST:  Chest crackles to auscultation. Normal respiratory effort.  ABDOMEN:  Soft and nontender.   EXTREMITIES:  There is no edema.       LABS:                        14.8   11.18 )-----------( 136      ( 23 Apr 2020 07:51 )             48.2     04-23    139  |  101  |  40<H>  ----------------------------<  166<H>  5.5<H>   |  35<H>  |  1.13    Ca    8.3<L>      23 Apr 2020 07:51  Phos  6.9     04-23  Mg     2.2     04-23    TPro  7.2  /  Alb  3.5  /  TBili  0.8  /  DBili  0.3<H>  /  AST  40<H>  /  ALT  60  /  AlkPhos  93  04-23

## 2020-04-23 NOTE — CHART NOTE - NSCHARTNOTEFT_GEN_A_CORE
Rapid response called due to AMS    64 y/o M with PMHx of , CHF and admitted due to acute respiratory failure with hypercapnia due to COPD exacerbation is evaluated at bedside after a RRT called due to AMS. At arrival patient found lethargic and minimal responsive to sternal rub. He was in NRB with a pO2 90-92%. A code stroke was called  overnight due to lethargy, disorienting ans slurred speech with a NIHSS score 4. A CT head performed on 4/22/20 showed no evidence of stroke or acute hemorrage. CT angio of head and neck also performed and showed no abnormality in brain vasculature.  Pt also with a hx of alcohol abuse and started in CIWA protocol one day ago, received 6 mg IV ativan overnight. No other events reported.       Vital Signs Last 24 Hrs  T(C): 36.7 (23 Apr 2020 06:52), Max: 37.1 (23 Apr 2020 02:54)  T(F): 98.1 (23 Apr 2020 06:52), Max: 98.7 (23 Apr 2020 02:54)  HR: 88 (23 Apr 2020 06:52) (88 - 115)  BP: 118/45 (23 Apr 2020 06:52) (118/45 - 138/73)  BP(mean): --  RR: 18 (23 Apr 2020 06:52) (18 - 22)  SpO2: 93% (23 Apr 2020 06:52) (93% - 96%)    Physical exam:  General: lethargic, not alert   Lungs: Matthew crackles   Heart: Irregular and tachycardic, no murmurs    Assessment and plan:    64 y/o M with PMHx of , CHF and admitted due to acute respiratory failure with hypercapnia due to COPD exacerbation and AMS.  - Low GGC requiring protection of airway. Pt will be transfer to ICU for mechanical intubation  - ABG stat   - Lasix 40 mg IV stat   - Chest X ray  - Keppra 500 mg IV one time   - Neuro consult

## 2020-04-23 NOTE — CONSULT NOTE ADULT - ASSESSMENT
65 year old male w chronic AFib w Watchman device, CHF, COPD, HTN, obesity came to ED w EMS c/o SOB since the morning of 04-18.  Per EMS, pt was in rapid afib in 180s upon EMS arrival with O2 sat in 80 acute respiratory failure with hypercapnia due to COPD exacerbation .     * Hypoxic / Metabolic Encephalopathy     -Rec EEG if sedation can be stopped  or hold.  - Repeat CT brain if stable medically.  - Check labs .    *AF with RVR   Per Cardiology  -Not on Ac with Watchman procedure.  -continue present meds    * COPD  Per Pulmonologist  Will follow up.  Discussed with Staff at bedside.

## 2020-04-23 NOTE — PROGRESS NOTE ADULT - SUBJECTIVE AND OBJECTIVE BOX
Patient seen at a  this morning for AMS and hypoxic  Patient had been admitted with a COPD exacerbation.  It was believed that he went into ETOH withdrawal and had a total of 6MG Ativan over night.  He required intubation this morning.      PAST MEDICAL HX  Alcohol abuse    Alcohol withdrawal    Anemia    AFIB atrial fibrillation    CHF (congestive heart failure)    Chronic atrial fibrillation    Chronic CHF    Chronic obstructive pulmonary disease (COPD)    Cirrhosis    Collapsed lung    COPD without exacerbation    Emphysema of lung    Falls    GERD (gastroesophageal reflux disease)    HTN hypertension    Meningitis    Poor historian    Presence of Watchman left atrial appendage closure device.    PAST SURGICAL HX  Presence of Watchman left atrial appendage closure device    S/P BKA (below knee amputation) unilateral, left    Unilateral amputation of lower extremity below knee.      FAMILY HX  Family history unknown: Adopted      SOCIAL HX  lives alone  former smoker  alcohol use last 2-3 days ago (19 Apr 2020 03:19)       PAST MEDICAL & SURGICAL HISTORY:  Chronic CHF  COPD without exacerbation  Atrial fibrillation  Presence of Watchman left atrial appendage closure device  Hypertension  GERD (gastroesophageal reflux disease)  Chronic obstructive pulmonary disease (COPD)  Anemia  Falls  Meningitis  Collapsed lung  Alcohol withdrawal  Emphysema of lung  Cirrhosis  CHF (congestive heart failure)  Poor historian  Alcohol abuse  Chronic atrial fibrillation  Unilateral amputation of lower extremity below knee  Presence of Watchman left atrial appendage closure device  S/P BKA (below knee amputation) unilateral, left      FAMILY HISTORY:  No pertinent family history in first degree relatives  Family history unknown: Adopted      Social Hx:    Allergies    Ceclor (Rash)  Duricef (Rash)    Intolerances            ICU Vital Signs Last 24 Hrs  T(C): 36.4 (23 Apr 2020 09:00), Max: 37.1 (23 Apr 2020 02:54)  T(F): 97.6 (23 Apr 2020 09:00), Max: 98.7 (23 Apr 2020 02:54)  HR: 90 (23 Apr 2020 10:00) (88 - 109)  BP: 106/58 (23 Apr 2020 10:00) (106/58 - 138/73)  BP(mean): 69 (23 Apr 2020 10:00) (69 - 74)  ABP: --  ABP(mean): --  RR: 20 (23 Apr 2020 10:00) (18 - 20)  SpO2: 99% (23 Apr 2020 10:00) (92% - 99%)      Mode: AC/ CMV (Assist Control/ Continuous Mandatory Ventilation)  RR (machine): 12  TV (machine): 550  PEEP: 5  ITime: 1.2  PIP: 26      I&O's Summary                            14.8   11.18 )-----------( 136      ( 23 Apr 2020 07:51 )             48.2       04-23    139  |  101  |  40<H>  ----------------------------<  166<H>  5.5<H>   |  35<H>  |  1.13    Ca    8.3<L>      23 Apr 2020 07:51  Phos  6.9     04-23  Mg     2.2     04-23    TPro  7.2  /  Alb  3.5  /  TBili  0.8  /  DBili  0.3<H>  /  AST  40<H>  /  ALT  60  /  AlkPhos  93  04-23      CARDIAC MARKERS ( 23 Apr 2020 07:51 )  x     / x     / 464 U/L / x     / x            ABG - ( 23 Apr 2020 12:32 )  pH, Arterial: 7.32  pH, Blood: x     /  pCO2: 67    /  pO2: 61    / HCO3: 34    / Base Excess: 6.0   /  SaO2: 90                      MEDICATIONS  (STANDING):  acetaZOLAMIDE    Tablet 125 milliGRAM(s) Oral daily  ALBUTerol    90 MICROgram(s) HFA Inhaler 2 Puff(s) Inhalation every 4 hours  aspirin enteric coated 81 milliGRAM(s) Oral daily  azithromycin  IVPB      azithromycin  IVPB 500 milliGRAM(s) IV Intermittent every 24 hours  budesonide 160 MICROgram(s)/formoterol 4.5 MICROgram(s) Inhaler 2 Puff(s) Inhalation two times a day  chlorhexidine 0.12% Liquid 15 milliLiter(s) Oral Mucosa every 12 hours  clopidogrel Tablet 75 milliGRAM(s) Oral daily  diltiazem    Tablet 60 milliGRAM(s) Enteral Tube every 6 hours  enoxaparin Injectable 40 milliGRAM(s) SubCutaneous every 12 hours  furosemide    Tablet 40 milliGRAM(s) Oral daily  gabapentin 400 milliGRAM(s) Oral three times a day  LORazepam   Injectable 2 milliGRAM(s) IV Push every 4 hours  methylPREDNISolone sodium succinate Injectable 60 milliGRAM(s) IV Push every 6 hours  pantoprazole  Injectable 40 milliGRAM(s) IV Push daily  propofol Infusion 5 MICROgram(s)/kG/Min (3.54 mL/Hr) IV Continuous <Continuous>  tiotropium 18 MICROgram(s) Capsule 1 Capsule(s) Inhalation daily    MEDICATIONS  (PRN):  traZODone 100 milliGRAM(s) Oral at bedtime PRN insomnia      DVT Prophylaxis:    Advanced Directives:  Discussed with:    Visit Information: 30 min    ** Time is exclusive of billed procedures and/or teaching and/or routine family updates.

## 2020-04-23 NOTE — PROGRESS NOTE ADULT - ASSESSMENT
PROBLEMS;    Ac on chronic hypercapnic & hypoxamic respiratory failure-s/p intubation  [facility]  AC flare of COPD/chronic vs acute on chronic bronchitis  Mild interstitial lung disease-NSIP h/o smoking  COVID negativex2  Pulmonary hypertension  nonobstructing stone in the left ureterovesicular junction/ nonobstructing stone in the lower pole right kidney.  Subacute hemorrhage in the right rectus abdominis along the anterior sheath, measures 1.6 cm in thickness and extends from the umbilicus to the inferior myotendinous junction  Cirrhosis  Mild splenomegaly-portal venous hypertension.  Chronic afib w Watchman device  CHF  BPH  GERD    PLAN;    vent support  repeat ABG  monitor rectus abd bleed-hold anti-coagulants  iv solumedrols 60mg q6hr first dose stat  iv azithromycin  aerosols  supportive care  covid repeat testing-neg  d/w staff

## 2020-04-24 DIAGNOSIS — S30.1XXA CONTUSION OF ABDOMINAL WALL, INITIAL ENCOUNTER: ICD-10-CM

## 2020-04-24 LAB
ANION GAP SERPL CALC-SCNC: 5 MMOL/L — SIGNIFICANT CHANGE UP (ref 5–17)
BASE EXCESS BLDA CALC-SCNC: 7.8 MMOL/L — HIGH (ref -2–2)
BLOOD GAS COMMENTS ARTERIAL: SIGNIFICANT CHANGE UP
BUN SERPL-MCNC: 39 MG/DL — HIGH (ref 7–23)
CALCIUM SERPL-MCNC: 8.1 MG/DL — LOW (ref 8.5–10.1)
CHLORIDE SERPL-SCNC: 101 MMOL/L — SIGNIFICANT CHANGE UP (ref 96–108)
CK SERPL-CCNC: 116 U/L — SIGNIFICANT CHANGE UP (ref 26–308)
CO2 SERPL-SCNC: 35 MMOL/L — HIGH (ref 22–31)
CREAT SERPL-MCNC: 0.89 MG/DL — SIGNIFICANT CHANGE UP (ref 0.5–1.3)
CULTURE RESULTS: SIGNIFICANT CHANGE UP
GAS PNL BLDA: SIGNIFICANT CHANGE UP
GLUCOSE SERPL-MCNC: 153 MG/DL — HIGH (ref 70–99)
HCO3 BLDA-SCNC: 36 MMOL/L — HIGH (ref 21–29)
HCT VFR BLD CALC: 42.9 % — SIGNIFICANT CHANGE UP (ref 39–50)
HGB BLD-MCNC: 13.8 G/DL — SIGNIFICANT CHANGE UP (ref 13–17)
MAGNESIUM SERPL-MCNC: 2.6 MG/DL — SIGNIFICANT CHANGE UP (ref 1.6–2.6)
MCHC RBC-ENTMCNC: 32.1 PG — SIGNIFICANT CHANGE UP (ref 27–34)
MCHC RBC-ENTMCNC: 32.2 GM/DL — SIGNIFICANT CHANGE UP (ref 32–36)
MCV RBC AUTO: 99.8 FL — SIGNIFICANT CHANGE UP (ref 80–100)
PCO2 BLDA: 67 MMHG — HIGH (ref 32–46)
PH BLDA: 7.35 — SIGNIFICANT CHANGE UP (ref 7.35–7.45)
PHOSPHATE SERPL-MCNC: 4.1 MG/DL — SIGNIFICANT CHANGE UP (ref 2.5–4.5)
PLATELET # BLD AUTO: 111 K/UL — LOW (ref 150–400)
PO2 BLDA: 86 MMHG — SIGNIFICANT CHANGE UP (ref 74–108)
POTASSIUM SERPL-MCNC: 4.1 MMOL/L — SIGNIFICANT CHANGE UP (ref 3.5–5.3)
POTASSIUM SERPL-SCNC: 4.1 MMOL/L — SIGNIFICANT CHANGE UP (ref 3.5–5.3)
RBC # BLD: 4.3 M/UL — SIGNIFICANT CHANGE UP (ref 4.2–5.8)
RBC # FLD: 13.1 % — SIGNIFICANT CHANGE UP (ref 10.3–14.5)
SAO2 % BLDA: 96 % — SIGNIFICANT CHANGE UP (ref 92–96)
SODIUM SERPL-SCNC: 141 MMOL/L — SIGNIFICANT CHANGE UP (ref 135–145)
SPECIMEN SOURCE: SIGNIFICANT CHANGE UP
TROPONIN I SERPL-MCNC: <0.015 NG/ML — SIGNIFICANT CHANGE UP (ref 0.01–0.04)
WBC # BLD: 7.79 K/UL — SIGNIFICANT CHANGE UP (ref 3.8–10.5)
WBC # FLD AUTO: 7.79 K/UL — SIGNIFICANT CHANGE UP (ref 3.8–10.5)

## 2020-04-24 PROCEDURE — 99291 CRITICAL CARE FIRST HOUR: CPT

## 2020-04-24 PROCEDURE — 99233 SBSQ HOSP IP/OBS HIGH 50: CPT

## 2020-04-24 PROCEDURE — 99232 SBSQ HOSP IP/OBS MODERATE 35: CPT

## 2020-04-24 RX ORDER — DEXMEDETOMIDINE HYDROCHLORIDE IN 0.9% SODIUM CHLORIDE 4 UG/ML
0.5 INJECTION INTRAVENOUS
Qty: 200 | Refills: 0 | Status: DISCONTINUED | OUTPATIENT
Start: 2020-04-24 | End: 2020-04-26

## 2020-04-24 RX ORDER — FOLIC ACID 0.8 MG
1 TABLET ORAL DAILY
Refills: 0 | Status: DISCONTINUED | OUTPATIENT
Start: 2020-04-24 | End: 2020-04-26

## 2020-04-24 RX ORDER — THIAMINE MONONITRATE (VIT B1) 100 MG
100 TABLET ORAL DAILY
Refills: 0 | Status: DISCONTINUED | OUTPATIENT
Start: 2020-04-24 | End: 2020-05-14

## 2020-04-24 RX ORDER — FUROSEMIDE 40 MG
40 TABLET ORAL DAILY
Refills: 0 | Status: DISCONTINUED | OUTPATIENT
Start: 2020-04-24 | End: 2020-04-26

## 2020-04-24 RX ADMIN — Medication 100 MILLIGRAM(S): at 12:13

## 2020-04-24 RX ADMIN — Medication 1 MILLIGRAM(S): at 12:13

## 2020-04-24 RX ADMIN — DEXMEDETOMIDINE HYDROCHLORIDE IN 0.9% SODIUM CHLORIDE 14.7 MICROGRAM(S)/KG/HR: 4 INJECTION INTRAVENOUS at 18:11

## 2020-04-24 RX ADMIN — CHLORHEXIDINE GLUCONATE 15 MILLILITER(S): 213 SOLUTION TOPICAL at 18:07

## 2020-04-24 RX ADMIN — Medication 81 MILLIGRAM(S): at 12:11

## 2020-04-24 RX ADMIN — CHLORHEXIDINE GLUCONATE 15 MILLILITER(S): 213 SOLUTION TOPICAL at 04:59

## 2020-04-24 RX ADMIN — Medication 2 MILLIGRAM(S): at 21:32

## 2020-04-24 RX ADMIN — AZITHROMYCIN 255 MILLIGRAM(S): 500 TABLET, FILM COATED ORAL at 18:06

## 2020-04-24 RX ADMIN — Medication 60 MILLIGRAM(S): at 18:06

## 2020-04-24 RX ADMIN — PROPOFOL 3.54 MICROGRAM(S)/KG/MIN: 10 INJECTION, EMULSION INTRAVENOUS at 06:32

## 2020-04-24 RX ADMIN — ACETAZOLAMIDE 125 MILLIGRAM(S): 250 TABLET ORAL at 12:12

## 2020-04-24 RX ADMIN — Medication 2 MILLIGRAM(S): at 18:07

## 2020-04-24 RX ADMIN — Medication 1 TABLET(S): at 12:11

## 2020-04-24 RX ADMIN — Medication 40 MILLIGRAM(S): at 04:57

## 2020-04-24 RX ADMIN — ENOXAPARIN SODIUM 40 MILLIGRAM(S): 100 INJECTION SUBCUTANEOUS at 12:12

## 2020-04-24 RX ADMIN — Medication 60 MILLIGRAM(S): at 04:57

## 2020-04-24 RX ADMIN — ENOXAPARIN SODIUM 40 MILLIGRAM(S): 100 INJECTION SUBCUTANEOUS at 21:32

## 2020-04-24 RX ADMIN — Medication 2 MILLIGRAM(S): at 01:58

## 2020-04-24 RX ADMIN — Medication 60 MILLIGRAM(S): at 12:10

## 2020-04-24 RX ADMIN — Medication 2 MILLIGRAM(S): at 04:57

## 2020-04-24 RX ADMIN — Medication 2 MILLIGRAM(S): at 12:12

## 2020-04-24 RX ADMIN — DEXMEDETOMIDINE HYDROCHLORIDE IN 0.9% SODIUM CHLORIDE 14.7 MICROGRAM(S)/KG/HR: 4 INJECTION INTRAVENOUS at 06:31

## 2020-04-24 RX ADMIN — Medication 40 MILLIGRAM(S): at 18:06

## 2020-04-24 RX ADMIN — PANTOPRAZOLE SODIUM 40 MILLIGRAM(S): 20 TABLET, DELAYED RELEASE ORAL at 12:10

## 2020-04-24 RX ADMIN — Medication 2 MILLIGRAM(S): at 15:53

## 2020-04-24 RX ADMIN — Medication 60 MILLIGRAM(S): at 12:11

## 2020-04-24 RX ADMIN — GABAPENTIN 400 MILLIGRAM(S): 400 CAPSULE ORAL at 04:57

## 2020-04-24 RX ADMIN — CLOPIDOGREL BISULFATE 75 MILLIGRAM(S): 75 TABLET, FILM COATED ORAL at 12:11

## 2020-04-24 NOTE — PROGRESS NOTE ADULT - SUBJECTIVE AND OBJECTIVE BOX
Subjective:    pat on vent, iv propofol, iv lorazepam, iv dexmedetomidine.    Home Medications:  albuterol 2.5 mg/3 mL (0.083%) inhalation solution: 3 milliliter(s) inhaled every 6 hours, As Needed -for shortness of breath and/or wheezing (19 Apr 2020 03:12)  gabapentin 400 mg oral capsule: 1 cap(s) orally 3 times a day (19 Apr 2020 03:12)  pantoprazole 40 mg oral granule, enteric coated: 40 milligram(s) orally once a day (19 Apr 2020 03:12)  Spiriva 18 mcg inhalation capsule: 1 cap(s) inhaled once a day (19 Apr 2020 03:12)  tamsulosin 0.4 mg oral capsule: 1 cap(s) orally once a day (at bedtime) (19 Apr 2020 03:12)  traZODone 50 mg oral tablet: 2 tab(s) orally once a day (at bedtime), As Needed (19 Apr 2020 03:12)    MEDICATIONS  (STANDING):  acetaZOLAMIDE    Tablet 125 milliGRAM(s) Oral daily  ALBUTerol    90 MICROgram(s) HFA Inhaler 2 Puff(s) Inhalation every 4 hours  aspirin enteric coated 81 milliGRAM(s) Oral daily  azithromycin  IVPB      azithromycin  IVPB 500 milliGRAM(s) IV Intermittent every 24 hours  budesonide 160 MICROgram(s)/formoterol 4.5 MICROgram(s) Inhaler 2 Puff(s) Inhalation two times a day  chlorhexidine 0.12% Liquid 15 milliLiter(s) Oral Mucosa every 12 hours  clopidogrel Tablet 75 milliGRAM(s) Oral daily  dexMEDEtomidine Infusion 0.5 MICROgram(s)/kG/Hr (14.7 mL/Hr) IV Continuous <Continuous>  diltiazem    Tablet 60 milliGRAM(s) Enteral Tube every 6 hours  enoxaparin Injectable 40 milliGRAM(s) SubCutaneous every 12 hours  folic acid 1 milliGRAM(s) Oral daily  furosemide    Tablet 40 milliGRAM(s) Oral daily  gabapentin 400 milliGRAM(s) Oral three times a day  LORazepam   Injectable 2 milliGRAM(s) IV Push every 4 hours  methylPREDNISolone sodium succinate Injectable 60 milliGRAM(s) IV Push every 6 hours  multivitamin 1 Tablet(s) Oral daily  pantoprazole  Injectable 40 milliGRAM(s) IV Push daily  propofol Infusion 5 MICROgram(s)/kG/Min (3.54 mL/Hr) IV Continuous <Continuous>  thiamine 100 milliGRAM(s) Oral daily  tiotropium 18 MICROgram(s) Capsule 1 Capsule(s) Inhalation daily    MEDICATIONS  (PRN):  traZODone 100 milliGRAM(s) Oral at bedtime PRN insomnia      Allergies    Ceclor (Rash)  Duricef (Rash)    Intolerances        Vital Signs Last 24 Hrs  T(C): 36.6 (24 Apr 2020 10:50), Max: 37.1 (23 Apr 2020 20:11)  T(F): 97.8 (24 Apr 2020 10:50), Max: 98.7 (23 Apr 2020 20:11)  HR: 80 (24 Apr 2020 07:00) (80 - 110)  BP: 103/66 (24 Apr 2020 07:00) (90/65 - 124/65)  BP(mean): 73 (24 Apr 2020 07:00) (65 - 85)  RR: 17 (24 Apr 2020 07:00) (12 - 33)  SpO2: 91% (24 Apr 2020 07:00) (90% - 100%)  Mode: AC/ CMV (Assist Control/ Continuous Mandatory Ventilation)  RR (machine): 12  TV (machine): 550  FiO2: 50  PEEP: 5  ITime: 1  PIP: 28      PHYSICAL EXAMINATION:    NECK:  Supple. No lymphadenopathy. Jugular venous pressure not elevated. Carotids equal.   HEART:   The cardiac impulse has a normal quality. Reg., Nl S1 and S2.  There are no murmurs, rubs or gallops noted  CHEST:  Chest few rhonchi to auscultation. Normal respiratory effort.  ABDOMEN:  Soft and nontender.   EXTREMITIES:  There is no edema.       LABS:                        13.8   7.79  )-----------( 111      ( 24 Apr 2020 06:52 )             42.9     04-24    141  |  101  |  39<H>  ----------------------------<  153<H>  4.1   |  35<H>  |  0.89    Ca    8.1<L>      24 Apr 2020 06:52  Phos  4.1     04-24  Mg     2.6     04-24    TPro  7.2  /  Alb  3.5  /  TBili  0.8  /  DBili  0.3<H>  /  AST  40<H>  /  ALT  60  /  AlkPhos  93  04-23      ABG - ( 24 Apr 2020 04:25 )  pH, Arterial: 7.35  pH, Blood: x     /  pCO2: 67    /  pO2: 86    / HCO3: 36    / Base Excess: 7.8   /  SaO2: 96        CT Angio Neck w/ IV Cont (04.22.20 @ 21:58) >  IMPRESSION:  Patent cervical and intracranial major arterial vasculature without evidence of abrupt vessel cut off, hemodynamically significant stenosis, aneurysm, or dissection.

## 2020-04-24 NOTE — PROGRESS NOTE ADULT - SUBJECTIVE AND OBJECTIVE BOX
· Reason for Admission	acute hypoxemic, hypercapneic resp failure COPD exacerbation	  4/24/20 : Pt on vent, sedated, responds to stimuli. Not following to commands.   MEDICATIONS  (STANDING):  acetaZOLAMIDE    Tablet 125 milliGRAM(s) Oral daily  ALBUTerol    90 MICROgram(s) HFA Inhaler 2 Puff(s) Inhalation every 4 hours  aspirin enteric coated 81 milliGRAM(s) Oral daily  azithromycin  IVPB      azithromycin  IVPB 500 milliGRAM(s) IV Intermittent every 24 hours  budesonide 160 MICROgram(s)/formoterol 4.5 MICROgram(s) Inhaler 2 Puff(s) Inhalation two times a day  chlorhexidine 0.12% Liquid 15 milliLiter(s) Oral Mucosa every 12 hours  clopidogrel Tablet 75 milliGRAM(s) Oral daily  dexMEDEtomidine Infusion 0.5 MICROgram(s)/kG/Hr (14.7 mL/Hr) IV Continuous <Continuous>  diltiazem    Tablet 60 milliGRAM(s) Enteral Tube every 6 hours  enoxaparin Injectable 40 milliGRAM(s) SubCutaneous every 12 hours  folic acid 1 milliGRAM(s) Oral daily  furosemide    Tablet 40 milliGRAM(s) Oral daily  gabapentin 400 milliGRAM(s) Oral three times a day  LORazepam   Injectable 2 milliGRAM(s) IV Push every 4 hours  methylPREDNISolone sodium succinate Injectable 60 milliGRAM(s) IV Push every 6 hours  multivitamin 1 Tablet(s) Oral daily  pantoprazole  Injectable 40 milliGRAM(s) IV Push daily  propofol Infusion 5 MICROgram(s)/kG/Min (3.54 mL/Hr) IV Continuous <Continuous>  thiamine 100 milliGRAM(s) Oral daily  tiotropium 18 MICROgram(s) Capsule 1 Capsule(s) Inhalation daily      Vital Signs Last 24 Hrs  T(C): 36.4 (24 Apr 2020 07:55), Max: 37.1 (23 Apr 2020 20:11)  T(F): 97.6 (24 Apr 2020 07:55), Max: 98.7 (23 Apr 2020 20:11)  HR: 80 (24 Apr 2020 07:00) (78 - 110)  BP: 103/66 (24 Apr 2020 07:00) (90/62 - 124/65)  BP(mean): 73 (24 Apr 2020 07:00) (65 - 85)  RR: 17 (24 Apr 2020 07:00) (12 - 33)  SpO2: 91% (24 Apr 2020 07:00) (90% - 100%)    Neurological exam:  HF: Unresponsive, on vent, sedated, on propofol.    CN: PRATIMA, EOMI, Gag not tested  Motor: Sedated limited exam. Moves UE to stimuli LLE Amputated RT LE no response    Sens: responds to stimuli moves BUE  Reflexes: Symmetric +1 BUE  No response RT LE LLE amputee  Coord:  can not test  Gait/Balance: Cannot test                        13.8   7.79  )-----------( 111      ( 24 Apr 2020 06:52 )             42.9     04-24    141  |  101  |  39<H>  ----------------------------<  153<H>  4.1   |  35<H>  |  0.89    Ca    8.1<L>      24 Apr 2020 06:52  Phos  4.1     04-24  Mg     2.6     04-24    TPro  7.2  /  Alb  3.5  /  TBili  0.8  /  DBili  0.3<H>  /  AST  40<H>  /  ALT  60  /  AlkPhos  93  04-23      Radiology report:  - CT Head  < from: CT Brain Stroke Protocol (04.22.20 @ 20:29) >  Impression:    No acute hemorrhage, mass effect or extra-axial collections.      < from: CT Angio Neck w/ IV Cont (04.22.20 @ 21:58) >  IMPRESSION:  Patent cervical and intracranial major arterial vasculature without evidence of abrupt vessel cut off, hemodynamically significant stenosis, aneurysm, or dissection.    EEG :   < from: EEG Awake and Asleep (04.23.20 @ 10:30) >  Impression:  This is an mild abnormal EEG due to the presence of  1. Mild generalized background slowing may be on the basis of underlying cerebral dysfunction may be on the basis of underlying metabolic, toxic or structural pathology or due to sedation.  2. Excess beta activity noted related to medication effect. Clinical correlation recommended.  3. No epileptiform activity noted. Clinical correlation recommended.  Repeat study without sedation if clinically indicated.

## 2020-04-24 NOTE — PROGRESS NOTE ADULT - SUBJECTIVE AND OBJECTIVE BOX
REASON FOR VISIT: AF, Rx management    HPI:  65 year old man with a history of chronic AF, Watchman device (implanted 4/2019), HTN, CVAs, alcoholism, tobacco abuse, COPD, HFpEF (60% 12/2018), GERD, cirrhosis, left BKA following traumatic injury admitted on 4/18/2020 with  acute on chronic hypercapnic & hypoxemic respiratory failure due to suspected exacerbation of COPD and/or bronchitis.    4/21/20 Patient complain of wheezing ,no sob at rest , on 4 L NC O2  despite of being on IV lasix ,  heart rate is 80 to 100s    4/22/2020:  "I'm still wheezing."  No new complaints.  4/23/2020:  Overnight events noted and case discussed with Dr Crews; sedated on vent in CICU.  4/24/20  Patient is remain intubated , his heart rate is controlled , rectus sheath hematoma without significant drop in hemoglobin level ,     MEDICATIONS  (STANDING):  acetaZOLAMIDE    Tablet 125 milliGRAM(s) Oral daily  ALBUTerol    90 MICROgram(s) HFA Inhaler 2 Puff(s) Inhalation every 4 hours  aspirin enteric coated 81 milliGRAM(s) Oral daily  azithromycin  IVPB      azithromycin  IVPB 500 milliGRAM(s) IV Intermittent every 24 hours  budesonide 160 MICROgram(s)/formoterol 4.5 MICROgram(s) Inhaler 2 Puff(s) Inhalation two times a day  chlorhexidine 0.12% Liquid 15 milliLiter(s) Oral Mucosa every 12 hours  clopidogrel Tablet 75 milliGRAM(s) Oral daily  dexMEDEtomidine Infusion 0.5 MICROgram(s)/kG/Hr (14.7 mL/Hr) IV Continuous <Continuous>  diltiazem    Tablet 60 milliGRAM(s) Enteral Tube every 6 hours  enoxaparin Injectable 40 milliGRAM(s) SubCutaneous every 12 hours  folic acid 1 milliGRAM(s) Oral daily  furosemide    Tablet 40 milliGRAM(s) Oral daily  gabapentin 400 milliGRAM(s) Oral three times a day  LORazepam   Injectable 2 milliGRAM(s) IV Push every 4 hours  methylPREDNISolone sodium succinate Injectable 60 milliGRAM(s) IV Push every 6 hours  multivitamin 1 Tablet(s) Oral daily  pantoprazole  Injectable 40 milliGRAM(s) IV Push daily  propofol Infusion 5 MICROgram(s)/kG/Min (3.54 mL/Hr) IV Continuous <Continuous>  thiamine 100 milliGRAM(s) Oral daily  tiotropium 18 MICROgram(s) Capsule 1 Capsule(s) Inhalation daily    MEDICATIONS  (PRN):  traZODone 100 milliGRAM(s) Oral at bedtime PRN insomnia      ICU Vital Signs Last 24 Hrs  T(C): 36.4 (24 Apr 2020 07:55), Max: 37.1 (23 Apr 2020 20:11)  T(F): 97.6 (24 Apr 2020 07:55), Max: 98.7 (23 Apr 2020 20:11)  HR: 80 (24 Apr 2020 07:00) (78 - 110)  BP: 103/66 (24 Apr 2020 07:00) (90/62 - 124/65)  BP(mean): 73 (24 Apr 2020 07:00) (65 - 85)  ABP: --  ABP(mean): --  RR: 17 (24 Apr 2020 07:00) (12 - 33)  SpO2: 91% (24 Apr 2020 07:00) (90% - 100%)    PHYSICAL EXAM:  Constitutional: Semirecumbent in bed, sedated   Respiratory: Scattered rhonchi, ETT to vent  Cardiovascular: Irregular, rate ~ 100 bpm at time of encounter  Gastrointestinal: Abdomen is soft, obese, nontender.   Extremities:  + leg edema (bilateral) with stasis erythema; s/p LLE amputation    LABS:                            13.8   7.79  )-----------( 111      ( 24 Apr 2020 06:52 )             42.9     04-24    141  |  101  |  39<H>  ----------------------------<  153<H>  4.1   |  35<H>  |  0.89    Ca    8.1<L>      24 Apr 2020 06:52  Phos  4.1     04-24  Mg     2.6     04-24    TPro  7.2  /  Alb  3.5  /  TBili  0.8  /  DBili  0.3<H>  /  AST  40<H>  /  ALT  60  /  AlkPhos  93  04-23    CARDIAC MARKERS ( 24 Apr 2020 06:52 )  <0.015 ng/mL / x     / 116 U/L / x     / x      CARDIAC MARKERS ( 23 Apr 2020 07:51 )  x     / x     / 464 U/L / x     / x          LIVER FUNCTIONS - ( 23 Apr 2020 07:51 )  Alb: 3.5 g/dL / Pro: 7.2 gm/dL / ALK PHOS: 93 U/L / ALT: 60 U/L / AST: 40 U/L / GGT: x                 Culture Results:   <10,000 CFU/mL Normal Urogenital Faith (04-21-20 @ 15:51)    Transthoracic Echocardiogram (06.10.19 @ 20:49):   Estimated left ventricular ejection fraction is 60-65 %. The left ventricle is normal in wall thickness, wall motion and contractility as seen in limited views.   The left ventricle cavity is moderately dilated.   The left atrium is severely dilated.   The right atrium appears severely dilated.   Normal appearing right ventricle structure and function.   Moderate (2+) mitral regurgitation.   Mild to Moderate Tricuspid regurgitation.     US Duplex Venous Lower Ext Complete, Bilateral (04.19.20 @ 16:17):  No evidence of deep venous thrombosis in either lower extremity.    Tele: AF    CT Brain Stroke Protocol (04.22.20 @ 20:29):  No acute hemorrhage, mass effect or extra-axial collections.    CT Abdomen and Pelvis w/wo IV Cont (04.22.20 @ 09:09) >  1.  3 mm nonobstructing stone in the left ureterovesicular junction.  2.  7 mm nonobstructing stone in the lower pole right kidney.  3.  Subacute hemorrhage in the right rectus abdominis along the anterior sheath, measures 1.6 cm in thickness and extends from the umbilicus to the inferior myotendinous junction.  4.  Cirrhosis. Mild splenomegaly is new from the prior and suggestive of portal venous hypertension.

## 2020-04-24 NOTE — PROGRESS NOTE ADULT - SUBJECTIVE AND OBJECTIVE BOX
Patient seen at a  this morning for AMS and hypoxic  Patient had been admitted with a COPD exacerbation.  It was believed that he went into ETOH withdrawal and had a total of 6MG Ativan over night.  He required intubation this morning.        4/24: he remains intubated.  Will try to wean this afternoon.              PAST MEDICAL & SURGICAL HISTORY:  Chronic CHF  COPD without exacerbation  Atrial fibrillation  Presence of Watchman left atrial appendage closure device  Hypertension  GERD (gastroesophageal reflux disease)  Chronic obstructive pulmonary disease (COPD)  Anemia  Falls  Meningitis  Collapsed lung  Alcohol withdrawal  Emphysema of lung  Cirrhosis  CHF (congestive heart failure)  Poor historian  Alcohol abuse  Chronic atrial fibrillation  Unilateral amputation of lower extremity below knee  Presence of Watchman left atrial appendage closure device  S/P BKA (below knee amputation) unilateral, left      FAMILY HISTORY:  No pertinent family history in first degree relatives  Family history unknown: Adopted      Social Hx:    Allergies    Ceclor (Rash)  Duricef (Rash)    Intolerances            ICU Vital Signs Last 24 Hrs  T(C): 36.6 (24 Apr 2020 10:50), Max: 37.1 (23 Apr 2020 20:11)  T(F): 97.8 (24 Apr 2020 10:50), Max: 98.7 (23 Apr 2020 20:11)  HR: 86 (24 Apr 2020 14:00) (80 - 110)  BP: 141/83 (24 Apr 2020 14:00) (90/65 - 141/83)  BP(mean): 98 (24 Apr 2020 14:00) (65 - 100)  ABP: --  ABP(mean): --  RR: 21 (24 Apr 2020 14:00) (12 - 33)  SpO2: 94% (24 Apr 2020 14:00) (90% - 100%)      Mode: AC/ CMV (Assist Control/ Continuous Mandatory Ventilation)  RR (machine): 12  TV (machine): 550  FiO2: 40  PEEP: 5  ITime: 1.4  MAP: 11  PIP: 25      I&O's Summary    23 Apr 2020 07:01  -  24 Apr 2020 07:00  --------------------------------------------------------  IN: 534 mL / OUT: 550 mL / NET: -16 mL    24 Apr 2020 07:01  -  24 Apr 2020 15:23  --------------------------------------------------------  IN: 0 mL / OUT: 400 mL / NET: -400 mL                              13.8   7.79  )-----------( 111      ( 24 Apr 2020 06:52 )             42.9       04-24    141  |  101  |  39<H>  ----------------------------<  153<H>  4.1   |  35<H>  |  0.89    Ca    8.1<L>      24 Apr 2020 06:52  Phos  4.1     04-24  Mg     2.6     04-24    TPro  7.2  /  Alb  3.5  /  TBili  0.8  /  DBili  0.3<H>  /  AST  40<H>  /  ALT  60  /  AlkPhos  93  04-23      CARDIAC MARKERS ( 24 Apr 2020 06:52 )  <0.015 ng/mL / x     / 116 U/L / x     / x      CARDIAC MARKERS ( 23 Apr 2020 07:51 )  x     / x     / 464 U/L / x     / x            ABG - ( 24 Apr 2020 04:25 )  pH, Arterial: 7.35  pH, Blood: x     /  pCO2: 67    /  pO2: 86    / HCO3: 36    / Base Excess: 7.8   /  SaO2: 96                      MEDICATIONS  (STANDING):  acetaZOLAMIDE    Tablet 125 milliGRAM(s) Oral daily  ALBUTerol    90 MICROgram(s) HFA Inhaler 2 Puff(s) Inhalation every 4 hours  aspirin enteric coated 81 milliGRAM(s) Oral daily  azithromycin  IVPB      azithromycin  IVPB 500 milliGRAM(s) IV Intermittent every 24 hours  budesonide 160 MICROgram(s)/formoterol 4.5 MICROgram(s) Inhaler 2 Puff(s) Inhalation two times a day  chlorhexidine 0.12% Liquid 15 milliLiter(s) Oral Mucosa every 12 hours  clopidogrel Tablet 75 milliGRAM(s) Oral daily  dexMEDEtomidine Infusion 0.5 MICROgram(s)/kG/Hr (14.7 mL/Hr) IV Continuous <Continuous>  diltiazem    Tablet 60 milliGRAM(s) Enteral Tube every 6 hours  enoxaparin Injectable 40 milliGRAM(s) SubCutaneous every 12 hours  folic acid 1 milliGRAM(s) Oral daily  furosemide    Tablet 40 milliGRAM(s) Oral daily  gabapentin 400 milliGRAM(s) Oral three times a day  LORazepam   Injectable 2 milliGRAM(s) IV Push every 4 hours  methylPREDNISolone sodium succinate Injectable 40 milliGRAM(s) IV Push every 6 hours  multivitamin 1 Tablet(s) Oral daily  pantoprazole  Injectable 40 milliGRAM(s) IV Push daily  propofol Infusion 5 MICROgram(s)/kG/Min (3.54 mL/Hr) IV Continuous <Continuous>  thiamine 100 milliGRAM(s) Oral daily  tiotropium 18 MICROgram(s) Capsule 1 Capsule(s) Inhalation daily    MEDICATIONS  (PRN):  traZODone 100 milliGRAM(s) Oral at bedtime PRN insomnia      DVT Prophylaxis:    Advanced Directives:  Discussed with:    Visit Information: 30 min    ** Time is exclusive of billed procedures and/or teaching and/or routine family updates.

## 2020-04-24 NOTE — PROGRESS NOTE ADULT - SUBJECTIVE AND OBJECTIVE BOX
Patient is a 65y old  Male who presents with a chief complaint of acute hypoxemic, hypercapneic resp failure  COPD exacerbation (24 Apr 2020 15:23)    PAST MEDICAL & SURGICAL HISTORY:  Chronic CHF  COPD without exacerbation  Atrial fibrillation  Presence of Watchman left atrial appendage closure device  Hypertension  GERD (gastroesophageal reflux disease)  Chronic obstructive pulmonary disease (COPD)  Anemia  Falls  Meningitis  Collapsed lung  Alcohol withdrawal  Emphysema of lung  Cirrhosis  CHF (congestive heart failure)  Poor historian  Alcohol abuse  Chronic atrial fibrillation  Unilateral amputation of lower extremity below knee  Presence of Watchman left atrial appendage closure device  S/P BKA (below knee amputation) unilateral, left    CLATUS CARUANA   65y    Male      Review of Systems:     UATO vented                   All other ROS are negative.    Allergies    Ceclor (Rash)  Duricef (Rash)    Intolerances          ICU Vital Signs Last 24 Hrs  T(C): 37.2 (24 Apr 2020 18:00), Max: 38.4 (24 Apr 2020 17:00)  T(F): 99 (24 Apr 2020 18:00), Max: 101.2 (24 Apr 2020 17:00)  HR: 78 (24 Apr 2020 20:00) (78 - 110)  BP: 133/74 (24 Apr 2020 20:00) (90/65 - 152/96)  BP(mean): 88 (24 Apr 2020 20:00) (65 - 110)  ABP: --  ABP(mean): --  RR: 15 (24 Apr 2020 20:00) (13 - 24)  SpO2: 93% (24 Apr 2020 20:00) (90% - 100%)    Physical Examination:    General: sedate     HEENT: no JVD     PULM: bilateral bs diminished     CVS: s1 s2 irreg    ABD: obese soft ecchymosis abd wall due to lovenox     EXT:  L KKA  + edema     SKIN:     Neuro: sedate moves 4     ABG - ( 24 Apr 2020 04:25 )  pH, Arterial: 7.35  pH, Blood: x     /  pCO2: 67    /  pO2: 86    / HCO3: 36    / Base Excess: 7.8   /  SaO2: 96                Mode: AC/ CMV (Assist Control/ Continuous Mandatory Ventilation)  RR (machine): 12  TV (machine): 550  FiO2: 30  PEEP: 5  ITime: 1.4  MAP: 11  PIP: 28    Mode: AC/ CMV (Assist Control/ Continuous Mandatory Ventilation), RR (machine): 12, TV (machine): 550, FiO2: 30, PEEP: 5, ITime: 1.4, MAP: 11, PIP: 28  LABS:                        13.8   7.79  )-----------( 111      ( 24 Apr 2020 06:52 )             42.9     04-24    141  |  101  |  39<H>  ----------------------------<  153<H>  4.1   |  35<H>  |  0.89    Ca    8.1<L>      24 Apr 2020 06:52  Phos  4.1     04-24  Mg     2.6     04-24    TPro  7.2  /  Alb  3.5  /  TBili  0.8  /  DBili  0.3<H>  /  AST  40<H>  /  ALT  60  /  AlkPhos  93  04-23      CARDIAC MARKERS ( 24 Apr 2020 06:52 )  <0.015 ng/mL / x     / 116 U/L / x     / x      CARDIAC MARKERS ( 23 Apr 2020 07:51 )  x     / x     / 464 U/L / x     / x          CAPILLARY BLOOD GLUCOSE            CULTURES:  Culture Results:   <10,000 CFU/mL Normal Urogenital Faith (04-21 @ 15:51)  Culture Results:   No Growth Final (04-18 @ 19:43)      Medications:  MEDICATIONS  (STANDING):  acetaZOLAMIDE    Tablet 125 milliGRAM(s) Oral daily  ALBUTerol    90 MICROgram(s) HFA Inhaler 2 Puff(s) Inhalation every 4 hours  aspirin enteric coated 81 milliGRAM(s) Oral daily  azithromycin  IVPB      azithromycin  IVPB 500 milliGRAM(s) IV Intermittent every 24 hours  budesonide 160 MICROgram(s)/formoterol 4.5 MICROgram(s) Inhaler 2 Puff(s) Inhalation two times a day  chlorhexidine 0.12% Liquid 15 milliLiter(s) Oral Mucosa every 12 hours  clopidogrel Tablet 75 milliGRAM(s) Oral daily  dexMEDEtomidine Infusion 0.5 MICROgram(s)/kG/Hr (14.7 mL/Hr) IV Continuous <Continuous>  diltiazem    Tablet 60 milliGRAM(s) Enteral Tube every 6 hours  enoxaparin Injectable 40 milliGRAM(s) SubCutaneous every 12 hours  folic acid 1 milliGRAM(s) Oral daily  furosemide    Tablet 40 milliGRAM(s) Oral daily  gabapentin 400 milliGRAM(s) Oral three times a day  LORazepam   Injectable 2 milliGRAM(s) IV Push every 4 hours  methylPREDNISolone sodium succinate Injectable 40 milliGRAM(s) IV Push every 6 hours  multivitamin 1 Tablet(s) Oral daily  pantoprazole  Injectable 40 milliGRAM(s) IV Push daily  propofol Infusion 5 MICROgram(s)/kG/Min (3.54 mL/Hr) IV Continuous <Continuous>  thiamine 100 milliGRAM(s) Oral daily  tiotropium 18 MICROgram(s) Capsule 1 Capsule(s) Inhalation daily    MEDICATIONS  (PRN):  traZODone 100 milliGRAM(s) Oral at bedtime PRN insomnia        04-23 @ 07:01  -  04-24 @ 07:00  --------------------------------------------------------  IN: 534 mL / OUT: 550 mL / NET: -16 mL    04-24 @ 07:01  -  04-24 @ 21:57  --------------------------------------------------------  IN: 350.4 mL / OUT: 400 mL / NET: -49.6 mL        RADIOLOGY/IMAGING/ECHO      < from: CT Brain Stroke Protocol (04.22.20 @ 20:29) >    Impression:    No acute hemorrhage, mass effect or extra-axial collections.        < from: CT Chest No Cont (04.19.20 @ 13:20) >  IMPRESSION:     Basilar atelectasis.        < end of copied text >        Assessment/Plan:    65 year old man with a history of chronic AF, Watchman device (implanted 4/2019), HTN, CVAs, alcoholism, tobacco abuse, COPD, HFpEF (60% 12/2018), GERD, cirrhosis, left BKA following traumatic injury admitted on 4/18/2020 with  acute on chronic hypercapnic & hypoxemic respiratory failure due to suspected exacerbation of COPD and/or bronchitis.    RX for AECOPD and ETOH withdrawal      Yesterday obtunded with benzo's> 7.22/87  intubated.    Acute on chronic hypercapnic resp failure       Sedate on Precedex and propofol.    Taper steroids and benzo's    B vitamins    DVT P     Get rid of diamox.      SBT plan to extubate   Nocturnal CPAP  VIJAY hx    Hospital day 6  Vent day  4/23      CRITICAL CARE TIME SPENT: 37 minutes assessing presenting problems of acute illness, which pose high probability of life threatening deterioration or end organ damage/dysfunction, as well as medical decision making including initiating plan of care, reviewing data, reviewing radiologic exams, discussing with multidisciplinary team,  discussing goals of care with patient/family, and writing this note.  Non-inclusive of procedures performed,

## 2020-04-24 NOTE — PROGRESS NOTE ADULT - ASSESSMENT
· Assessment		  65 year old male w chronic AFib w Watchman device, CHF, COPD, HTN, obesity came to ED w EMS c/o SOB since the morning of 04-18.  Per EMS, pt was in rapid afib in 180s upon EMS arrival with O2 sat in 80 acute respiratory failure with hypercapnia due to COPD exacerbation .     * Hypoxic / Metabolic Encephalopathy     - EEG bilat slow sedated.  - Repeat CT brain  or MRI when stable  - Check labs .    *AF with RVR   Per Cardiology  -Not on Ac with Watchman procedure.  -continue present meds  Discussed with Dr. Palla.    * COPD  Per Pulmonologist  Will follow up.  Discussed with Staff .    Follow up with Dr. Gurrola from tomorrow.

## 2020-04-24 NOTE — PROGRESS NOTE ADULT - ASSESSMENT
PROBLEMS;    Ac on chronic hypercapnic & hypoxamic respiratory failure-s/p intubation  OHS/VIJAY  AC flare of COPD/chronic vs acute on chronic bronchitis  Mild interstitial lung disease-NSIP h/o smoking  COVID negativex2  Pulmonary hypertension  Nonobstructing stone in the left ureterovesicular junction/ nonobstructing stone in the lower pole right kidney.  Subacute hemorrhage in the right rectus abdominis along the anterior sheath, measures 1.6 cm in thickness and extends from the umbilicus to the inferior myotendinous junction  Cirrhosis  Mild splenomegaly-portal venous hypertension.  Chronic afib w Watchman device  CHF  BPH  GERD  ETOH abuse  seizures disorder    PLAN;    vent support-weaning as tolerated  monitor rectus abd bleed-hold anti-coagulants  iv solumedrols 60mg q6hr  iv azithromycin  aerosols  supportive care  covid repeat testing-neg  d/w staff

## 2020-04-24 NOTE — PROGRESS NOTE ADULT - ASSESSMENT
A/P: 65 male with acute respiratory failure from altered awareness and sedation  COPD  AFIB s/p watchman  CHF  HTN  ETOH abuse      Plan:   CICU    PULM: PSV wean today, BPDs, Steroids decreased    Cardio: BP stable, continue ASA, Plavix, Cardizem    Renal: Cr. Stable    GI: Start feeds if not extubated, PPI    PSY: Ativan ATC, Thiamine for chronic ETOH abuse and thiamine deficiency folate    Lovenox DVT Prophylaxis    D/W the patients daughter

## 2020-04-25 LAB
ANION GAP SERPL CALC-SCNC: 5 MMOL/L — SIGNIFICANT CHANGE UP (ref 5–17)
BASE EXCESS BLDA CALC-SCNC: 6.8 MMOL/L — HIGH (ref -2–2)
BLOOD GAS COMMENTS ARTERIAL: SIGNIFICANT CHANGE UP
BUN SERPL-MCNC: 43 MG/DL — HIGH (ref 7–23)
CALCIUM SERPL-MCNC: 8 MG/DL — LOW (ref 8.5–10.1)
CHLORIDE SERPL-SCNC: 105 MMOL/L — SIGNIFICANT CHANGE UP (ref 96–108)
CO2 SERPL-SCNC: 33 MMOL/L — HIGH (ref 22–31)
CREAT SERPL-MCNC: 0.91 MG/DL — SIGNIFICANT CHANGE UP (ref 0.5–1.3)
GAS PNL BLDA: SIGNIFICANT CHANGE UP
GLUCOSE SERPL-MCNC: 157 MG/DL — HIGH (ref 70–99)
HCO3 BLDA-SCNC: 31 MMOL/L — HIGH (ref 21–29)
HCT VFR BLD CALC: 46.3 % — SIGNIFICANT CHANGE UP (ref 39–50)
HGB BLD-MCNC: 15.4 G/DL — SIGNIFICANT CHANGE UP (ref 13–17)
MAGNESIUM SERPL-MCNC: 2.7 MG/DL — HIGH (ref 1.6–2.6)
MCHC RBC-ENTMCNC: 32.1 PG — SIGNIFICANT CHANGE UP (ref 27–34)
MCHC RBC-ENTMCNC: 33.3 GM/DL — SIGNIFICANT CHANGE UP (ref 32–36)
MCV RBC AUTO: 96.5 FL — SIGNIFICANT CHANGE UP (ref 80–100)
PCO2 BLDA: 45 MMHG — SIGNIFICANT CHANGE UP (ref 32–46)
PH BLDA: 7.46 — HIGH (ref 7.35–7.45)
PHOSPHATE SERPL-MCNC: 3.8 MG/DL — SIGNIFICANT CHANGE UP (ref 2.5–4.5)
PLATELET # BLD AUTO: 96 K/UL — LOW (ref 150–400)
PO2 BLDA: 66 MMHG — LOW (ref 74–108)
POTASSIUM SERPL-MCNC: 3.9 MMOL/L — SIGNIFICANT CHANGE UP (ref 3.5–5.3)
POTASSIUM SERPL-SCNC: 3.9 MMOL/L — SIGNIFICANT CHANGE UP (ref 3.5–5.3)
RBC # BLD: 4.8 M/UL — SIGNIFICANT CHANGE UP (ref 4.2–5.8)
RBC # FLD: 12.9 % — SIGNIFICANT CHANGE UP (ref 10.3–14.5)
SAO2 % BLDA: 93 % — SIGNIFICANT CHANGE UP (ref 92–96)
SODIUM SERPL-SCNC: 143 MMOL/L — SIGNIFICANT CHANGE UP (ref 135–145)
WBC # BLD: 8.06 K/UL — SIGNIFICANT CHANGE UP (ref 3.8–10.5)
WBC # FLD AUTO: 8.06 K/UL — SIGNIFICANT CHANGE UP (ref 3.8–10.5)

## 2020-04-25 PROCEDURE — 99291 CRITICAL CARE FIRST HOUR: CPT

## 2020-04-25 PROCEDURE — 99232 SBSQ HOSP IP/OBS MODERATE 35: CPT

## 2020-04-25 PROCEDURE — 99233 SBSQ HOSP IP/OBS HIGH 50: CPT

## 2020-04-25 PROCEDURE — 71045 X-RAY EXAM CHEST 1 VIEW: CPT | Mod: 26

## 2020-04-25 RX ADMIN — ENOXAPARIN SODIUM 40 MILLIGRAM(S): 100 INJECTION SUBCUTANEOUS at 11:58

## 2020-04-25 RX ADMIN — Medication 40 MILLIGRAM(S): at 05:57

## 2020-04-25 RX ADMIN — Medication 1 TABLET(S): at 11:57

## 2020-04-25 RX ADMIN — ENOXAPARIN SODIUM 40 MILLIGRAM(S): 100 INJECTION SUBCUTANEOUS at 22:06

## 2020-04-25 RX ADMIN — Medication 2 MILLIGRAM(S): at 13:51

## 2020-04-25 RX ADMIN — Medication 2 MILLIGRAM(S): at 22:06

## 2020-04-25 RX ADMIN — Medication 40 MILLIGRAM(S): at 17:22

## 2020-04-25 RX ADMIN — Medication 100 MILLIGRAM(S): at 11:57

## 2020-04-25 RX ADMIN — Medication 60 MILLIGRAM(S): at 01:41

## 2020-04-25 RX ADMIN — Medication 60 MILLIGRAM(S): at 05:57

## 2020-04-25 RX ADMIN — Medication 81 MILLIGRAM(S): at 11:57

## 2020-04-25 RX ADMIN — PANTOPRAZOLE SODIUM 40 MILLIGRAM(S): 20 TABLET, DELAYED RELEASE ORAL at 11:58

## 2020-04-25 RX ADMIN — CHLORHEXIDINE GLUCONATE 15 MILLILITER(S): 213 SOLUTION TOPICAL at 17:23

## 2020-04-25 RX ADMIN — Medication 40 MILLIGRAM(S): at 11:58

## 2020-04-25 RX ADMIN — Medication 40 MILLIGRAM(S): at 01:41

## 2020-04-25 RX ADMIN — Medication 2 MILLIGRAM(S): at 05:57

## 2020-04-25 RX ADMIN — CHLORHEXIDINE GLUCONATE 15 MILLILITER(S): 213 SOLUTION TOPICAL at 05:57

## 2020-04-25 RX ADMIN — Medication 1 MILLIGRAM(S): at 11:57

## 2020-04-25 RX ADMIN — CLOPIDOGREL BISULFATE 75 MILLIGRAM(S): 75 TABLET, FILM COATED ORAL at 11:57

## 2020-04-25 RX ADMIN — Medication 60 MILLIGRAM(S): at 17:22

## 2020-04-25 RX ADMIN — Medication 60 MILLIGRAM(S): at 11:57

## 2020-04-25 NOTE — PROGRESS NOTE ADULT - ASSESSMENT
PROBLEMS;    Ac on chronic hypercapnic & hypoxamic respiratory failure-s/p intubation  OHS/VIJAY  AC flare of COPD/chronic vs acute on chronic bronchitis  Mild interstitial lung disease-NSIP h/o smoking  COVID negativex2  Pulmonary hypertension  Nonobstructing stone in the left ureterovesicular junction/ nonobstructing stone in the lower pole right kidney.  Subacute hemorrhage in the right rectus abdominis along the anterior sheath, measures 1.6 cm in thickness and extends from the umbilicus to the inferior myotendinous junction  Cirrhosis  Mild splenomegaly-portal venous hypertension.  Chronic afib w Watchman device  CHF  BPH  GERD  ETOH abuse  seizures disorder    PLAN;    vent support-weaning as tolerated  monitor rectus abd bleed-hold anti-coagulants  taper iv solumedrols 40mg q6hr  iv azithromycin  aerosols  supportive care  covid repeat testing-neg  d/w staff

## 2020-04-25 NOTE — PROGRESS NOTE ADULT - SUBJECTIVE AND OBJECTIVE BOX
Patient seen at a  this morning for AMS and hypoxic  Patient had been admitted with a COPD exacerbation.  It was believed that he went into ETOH withdrawal and had a total of 6MG Ativan over night.  He required intubation this morning.        4/24: he remains intubated.  Will try to wean this afternoon.      4/25: He weaned this morning and failed with a decreased Tv.  But close to extubation.      PAST MEDICAL HX  Alcohol abuse    Alcohol withdrawal    Anemia    AFIB atrial fibrillation    CHF (congestive heart failure)    Chronic atrial fibrillation    Chronic CHF    Chronic obstructive pulmonary disease (COPD)    Cirrhosis    Collapsed lung    COPD without exacerbation    Emphysema of lung    Falls    GERD (gastroesophageal reflux disease)    HTN hypertension    Meningitis    Poor historian    Presence of Watchman left atrial appendage closure device.    PAST SURGICAL HX  Presence of Watchman left atrial appendage closure device    S/P BKA (below knee amputation) unilateral, left    Unilateral amputation of lower extremity below knee.      FAMILY HX  Family history unknown: Adopted      SOCIAL HX  lives alone  former smoker  alcohol use last 2-3 days ago (19 Apr 2020 03:19)       PAST MEDICAL & SURGICAL HISTORY:  Chronic CHF  COPD without exacerbation  Atrial fibrillation  Presence of Watchman left atrial appendage closure device  Hypertension  GERD (gastroesophageal reflux disease)  Chronic obstructive pulmonary disease (COPD)  Anemia  Falls  Meningitis  Collapsed lung  Alcohol withdrawal  Emphysema of lung  Cirrhosis  CHF (congestive heart failure)  Poor historian  Alcohol abuse  Chronic atrial fibrillation  Unilateral amputation of lower extremity below knee  Presence of Watchman left atrial appendage closure device  S/P BKA (below knee amputation) unilateral, left      FAMILY HISTORY:  No pertinent family history in first degree relatives  Family history unknown: Adopted      Social Hx:    Allergies    Ceclor (Rash)  Duricef (Rash)    Intolerances            ICU Vital Signs Last 24 Hrs  T(C): 37.7 (25 Apr 2020 11:00), Max: 38.4 (24 Apr 2020 17:00)  T(F): 99.8 (25 Apr 2020 11:00), Max: 101.2 (24 Apr 2020 17:00)  HR: 77 (25 Apr 2020 14:00) (77 - 100)  BP: 114/92 (25 Apr 2020 14:00) (114/92 - 167/94)  BP(mean): 97 (25 Apr 2020 14:00) (83 - 119)  ABP: --  ABP(mean): --  RR: 22 (25 Apr 2020 14:00) (14 - 27)  SpO2: 92% (25 Apr 2020 14:00) (89% - 97%)      Mode: CPAP with PS  PEEP: 5  PS: 10      I&O's Summary    24 Apr 2020 07:01  -  25 Apr 2020 07:00  --------------------------------------------------------  IN: 350.4 mL / OUT: 1300 mL / NET: -949.6 mL                              15.4   8.06  )-----------( 96       ( 25 Apr 2020 06:42 )             46.3       04-25    143  |  105  |  43<H>  ----------------------------<  157<H>  3.9   |  33<H>  |  0.91    Ca    8.0<L>      25 Apr 2020 06:42  Phos  3.8     04-25  Mg     2.7     04-25        CARDIAC MARKERS ( 24 Apr 2020 06:52 )  <0.015 ng/mL / x     / 116 U/L / x     / x            ABG - ( 25 Apr 2020 05:12 )  pH, Arterial: 7.46  pH, Blood: x     /  pCO2: 45    /  pO2: 66    / HCO3: 31    / Base Excess: 6.8   /  SaO2: 93                      MEDICATIONS  (STANDING):  ALBUTerol    90 MICROgram(s) HFA Inhaler 2 Puff(s) Inhalation every 4 hours  aspirin enteric coated 81 milliGRAM(s) Oral daily  azithromycin  IVPB      azithromycin  IVPB 500 milliGRAM(s) IV Intermittent every 24 hours  budesonide 160 MICROgram(s)/formoterol 4.5 MICROgram(s) Inhaler 2 Puff(s) Inhalation two times a day  chlorhexidine 0.12% Liquid 15 milliLiter(s) Oral Mucosa every 12 hours  clopidogrel Tablet 75 milliGRAM(s) Oral daily  dexMEDEtomidine Infusion 0.5 MICROgram(s)/kG/Hr (14.7 mL/Hr) IV Continuous <Continuous>  diltiazem    Tablet 60 milliGRAM(s) Enteral Tube every 6 hours  enoxaparin Injectable 40 milliGRAM(s) SubCutaneous every 12 hours  folic acid 1 milliGRAM(s) Oral daily  furosemide    Tablet 40 milliGRAM(s) Oral daily  gabapentin 400 milliGRAM(s) Oral three times a day  LORazepam   Injectable 2 milliGRAM(s) IV Push every 8 hours  methylPREDNISolone sodium succinate Injectable 40 milliGRAM(s) IV Push every 6 hours  multivitamin 1 Tablet(s) Oral daily  pantoprazole  Injectable 40 milliGRAM(s) IV Push daily  propofol Infusion 5 MICROgram(s)/kG/Min (3.54 mL/Hr) IV Continuous <Continuous>  thiamine 100 milliGRAM(s) Oral daily  tiotropium 18 MICROgram(s) Capsule 1 Capsule(s) Inhalation daily    MEDICATIONS  (PRN):  traZODone 100 milliGRAM(s) Oral at bedtime PRN insomnia      DVT Prophylaxis:    Advanced Directives:  Discussed with:    Visit Information: 30 min    ** Time is exclusive of billed procedures and/or teaching and/or routine family updates.

## 2020-04-25 NOTE — PROGRESS NOTE ADULT - SUBJECTIVE AND OBJECTIVE BOX
Subjective:    pat on cpap only 300 tidal volume, can not tolerate off sedation    Home Medications:  albuterol 2.5 mg/3 mL (0.083%) inhalation solution: 3 milliliter(s) inhaled every 6 hours, As Needed -for shortness of breath and/or wheezing (19 Apr 2020 03:12)  gabapentin 400 mg oral capsule: 1 cap(s) orally 3 times a day (19 Apr 2020 03:12)  pantoprazole 40 mg oral granule, enteric coated: 40 milligram(s) orally once a day (19 Apr 2020 03:12)  Spiriva 18 mcg inhalation capsule: 1 cap(s) inhaled once a day (19 Apr 2020 03:12)  tamsulosin 0.4 mg oral capsule: 1 cap(s) orally once a day (at bedtime) (19 Apr 2020 03:12)  traZODone 50 mg oral tablet: 2 tab(s) orally once a day (at bedtime), As Needed (19 Apr 2020 03:12)    MEDICATIONS  (STANDING):  ALBUTerol    90 MICROgram(s) HFA Inhaler 2 Puff(s) Inhalation every 4 hours  aspirin enteric coated 81 milliGRAM(s) Oral daily  budesonide 160 MICROgram(s)/formoterol 4.5 MICROgram(s) Inhaler 2 Puff(s) Inhalation two times a day  chlorhexidine 0.12% Liquid 15 milliLiter(s) Oral Mucosa every 12 hours  clopidogrel Tablet 75 milliGRAM(s) Oral daily  dexMEDEtomidine Infusion 0.5 MICROgram(s)/kG/Hr (14.7 mL/Hr) IV Continuous <Continuous>  diltiazem    Tablet 60 milliGRAM(s) Enteral Tube every 6 hours  enoxaparin Injectable 40 milliGRAM(s) SubCutaneous every 12 hours  folic acid 1 milliGRAM(s) Oral daily  furosemide    Tablet 40 milliGRAM(s) Oral daily  gabapentin 400 milliGRAM(s) Oral three times a day  LORazepam   Injectable 2 milliGRAM(s) IV Push every 8 hours  methylPREDNISolone sodium succinate Injectable 40 milliGRAM(s) IV Push every 6 hours  multivitamin 1 Tablet(s) Oral daily  pantoprazole  Injectable 40 milliGRAM(s) IV Push daily  propofol Infusion 5 MICROgram(s)/kG/Min (3.54 mL/Hr) IV Continuous <Continuous>  thiamine 100 milliGRAM(s) Oral daily  tiotropium 18 MICROgram(s) Capsule 1 Capsule(s) Inhalation daily    MEDICATIONS  (PRN):  traZODone 100 milliGRAM(s) Oral at bedtime PRN insomnia      Allergies    Ceclor (Rash)  Duricef (Rash)    Intolerances        Vital Signs Last 24 Hrs  T(C): 37.9 (25 Apr 2020 15:58), Max: 38.4 (24 Apr 2020 17:00)  T(F): 100.3 (25 Apr 2020 15:58), Max: 101.2 (24 Apr 2020 17:00)  HR: 75 (25 Apr 2020 16:00) (75 - 100)  BP: 142/85 (25 Apr 2020 16:00) (114/92 - 167/94)  BP(mean): 99 (25 Apr 2020 16:00) (83 - 119)  RR: 16 (25 Apr 2020 16:00) (14 - 27)  SpO2: 90% (25 Apr 2020 16:00) (89% - 95%)  Mode: CPAP with PS  PEEP: 5  PS: 10      PHYSICAL EXAMINATION:    NECK:  Supple. No lymphadenopathy. Jugular venous pressure not elevated. Carotids equal.   HEART:   The cardiac impulse has a normal quality. Reg., Nl S1 and S2.  There are no murmurs, rubs or gallops noted  CHEST:  Chest crackles to auscultation. Normal respiratory effort.  ABDOMEN:  Soft and nontender.   EXTREMITIES:  There is no edema.       LABS:                        15.4   8.06  )-----------( 96       ( 25 Apr 2020 06:42 )             46.3     04-25    143  |  105  |  43<H>  ----------------------------<  157<H>  3.9   |  33<H>  |  0.91    Ca    8.0<L>      25 Apr 2020 06:42  Phos  3.8     04-25  Mg     2.7     04-25    ABG - ( 25 Apr 2020 05:12 )  pH, Arterial: 7.46  pH, Blood: x     /  pCO2: 45    /  pO2: 66    / HCO3: 31    / Base Excess: 6.8   /  SaO2: 93            Chest 1 View AP/PA. (04.25.20 @ 10:12) >  Impression:  1.  Stable exam      DISCRETE X-RAY DATA:  Percent of LEFT lung opacification: Clear  Percent of RIGHT lung opacification: Clear  Change in lung opacification from most recent x-ray (<=3 days): Stable  Change from prior dated 3 or more days (same admission): No Prior

## 2020-04-25 NOTE — PROGRESS NOTE ADULT - ASSESSMENT
· Assessment		  65 year old male w chronic AFib w Watchman device, CHF, COPD, HTN, alcohol abuse, obesity came to ED w EMS c/o SOB since the morning of 04-18.  Per EMS, pt was in rapid afib in 180s upon EMS arrival with O2 sat in 80 acute respiratory failure with hypercapnia due to COPD exacerbation .    Neurology called after episode of altered mental status and slurred speech on 4/22.    -CT head negative for any acute stroke.  -Likely hypoxic/metabolic encephalopathy  -EEG showing evidence of encephalopathy but no epileptiform activity.   -Repeat CT head or MRI when stable  -Wean sedation as tolerated    *AF with RVR   Per Cardiology  -Not on Ac with Watchman procedure.  -continue present meds      * COPD/Respiratory failure  Per Pulmonologist    Will follow up.

## 2020-04-25 NOTE — PROGRESS NOTE ADULT - SUBJECTIVE AND OBJECTIVE BOX
Interval History:  4/25/20: Patient became agitated when sedation lowered.    MEDICATIONS  (STANDING):  ALBUTerol    90 MICROgram(s) HFA Inhaler 2 Puff(s) Inhalation every 4 hours  aspirin enteric coated 81 milliGRAM(s) Oral daily  azithromycin  IVPB      azithromycin  IVPB 500 milliGRAM(s) IV Intermittent every 24 hours  budesonide 160 MICROgram(s)/formoterol 4.5 MICROgram(s) Inhaler 2 Puff(s) Inhalation two times a day  chlorhexidine 0.12% Liquid 15 milliLiter(s) Oral Mucosa every 12 hours  clopidogrel Tablet 75 milliGRAM(s) Oral daily  dexMEDEtomidine Infusion 0.5 MICROgram(s)/kG/Hr (14.7 mL/Hr) IV Continuous <Continuous>  diltiazem    Tablet 60 milliGRAM(s) Enteral Tube every 6 hours  enoxaparin Injectable 40 milliGRAM(s) SubCutaneous every 12 hours  folic acid 1 milliGRAM(s) Oral daily  furosemide    Tablet 40 milliGRAM(s) Oral daily  gabapentin 400 milliGRAM(s) Oral three times a day  LORazepam   Injectable 2 milliGRAM(s) IV Push every 8 hours  methylPREDNISolone sodium succinate Injectable 40 milliGRAM(s) IV Push every 6 hours  multivitamin 1 Tablet(s) Oral daily  pantoprazole  Injectable 40 milliGRAM(s) IV Push daily  propofol Infusion 5 MICROgram(s)/kG/Min (3.54 mL/Hr) IV Continuous <Continuous>  thiamine 100 milliGRAM(s) Oral daily  tiotropium 18 MICROgram(s) Capsule 1 Capsule(s) Inhalation daily    MEDICATIONS  (PRN):  traZODone 100 milliGRAM(s) Oral at bedtime PRN insomnia      Allergies    Ceclor (Rash)  Duricef (Rash)    Intolerances        PHYSICAL EXAM:  Vital Signs Last 24 Hrs  T(F): 99.8 (04-25-20 @ 11:00)  HR: 84 (04-25-20 @ 11:00)  BP: 139/78 (04-25-20 @ 11:00)  RR: 15 (04-25-20 @ 11:00)    GENERAL: NAD, well-groomed, well-developed  HEAD:  Atraumatic, Normocephalic  Neuro:  Intubated. On ventilator. On sedation.  Breathing over the vent.  Pupils reactive  + oculocephalics  + corneals  Grimaces and withdraws to noxious stimuli in upper extremities and right lower extremity (left lower leg amputation)    LABS:                        15.4   8.06  )-----------( 96       ( 25 Apr 2020 06:42 )             46.3     04-25    143  |  105  |  43<H>  ----------------------------<  157<H>  3.9   |  33<H>  |  0.91    Ca    8.0<L>      25 Apr 2020 06:42  Phos  3.8     04-25  Mg     2.7     04-25            RADIOLOGY & ADDITIONAL STUDIES:    CT Head  < from: CT Brain Stroke Protocol (04.22.20 @ 20:29) >  Impression:    No acute hemorrhage, mass effect or extra-axial collections.      < from: CT Angio Neck w/ IV Cont (04.22.20 @ 21:58) >  IMPRESSION:  Patent cervical and intracranial major arterial vasculature without evidence of abrupt vessel cut off, hemodynamically significant stenosis, aneurysm, or dissection.    EEG :   < from: EEG Awake and Asleep (04.23.20 @ 10:30) >  Impression:  This is an mild abnormal EEG due to the presence of  1. Mild generalized background slowing may be on the basis of underlying cerebral dysfunction may be on the basis of underlying metabolic, toxic or structural pathology or due to sedation.  2. Excess beta activity noted related to medication effect. Clinical correlation recommended.  3. No epileptiform activity noted. Clinical correlation recommended.  Repeat study without sedation if clinically indicated.

## 2020-04-25 NOTE — PROGRESS NOTE ADULT - SUBJECTIVE AND OBJECTIVE BOX
Patient is a 65y old  Male who presents with a chief complaint of acute hypoxemic, hypercapneic resp failure  COPD exacerbation (25 Apr 2020 16:16)    PAST MEDICAL & SURGICAL HISTORY:  Chronic CHF  COPD without exacerbation  Atrial fibrillation  Presence of Watchman left atrial appendage closure device  Hypertension  GERD (gastroesophageal reflux disease)  Chronic obstructive pulmonary disease (COPD)  Anemia  Falls  Meningitis  Collapsed lung  Alcohol withdrawal  Emphysema of lung  Cirrhosis  CHF (congestive heart failure)  Poor historian  Alcohol abuse  Chronic atrial fibrillation  Unilateral amputation of lower extremity below knee  Presence of Watchman left atrial appendage closure device  S/P BKA (below knee amputation) unilateral, left    CLATUS CARUANA   65y    Male      Review of Systems:       UATO                All other ROS are negative.    Allergies    Ceclor (Rash)  Duricef (Rash)    Intolerances          ICU Vital Signs Last 24 Hrs  T(C): 37.9 (25 Apr 2020 15:58), Max: 38.3 (25 Apr 2020 04:43)  T(F): 100.3 (25 Apr 2020 15:58), Max: 101 (25 Apr 2020 04:43)  HR: 87 (25 Apr 2020 22:00) (75 - 102)  BP: 141/79 (25 Apr 2020 22:00) (114/92 - 167/94)  BP(mean): 94 (25 Apr 2020 22:00) (83 - 113)  ABP: --  ABP(mean): --  RR: 5 (25 Apr 2020 22:00) (5 - 27)  SpO2: 93% (25 Apr 2020 22:00) (90% - 94%)    Physical Examination:    General: sedate     HEENT: no JVD     PULM: bilateral bs diminished     CVS: s1 s2 irreg    ABD: obese soft ecchymosis abd wall due to lovenox     EXT:  L KKA  + edema     SKIN:     Neuro: sedate moves 4     ABG - ( 25 Apr 2020 05:12 )  pH, Arterial: 7.46  pH, Blood: x     /  pCO2: 45    /  pO2: 66    / HCO3: 31    / Base Excess: 6.8   /  SaO2: 93                Mode: AC/ CMV (Assist Control/ Continuous Mandatory Ventilation)  RR (machine): 12  TV (machine): 550  FiO2: 30  PEEP: 5  PIP: 26    Mode: AC/ CMV (Assist Control/ Continuous Mandatory Ventilation), RR (machine): 12, TV (machine): 550, FiO2: 30, PEEP: 5, PIP: 26  LABS:                        15.4   8.06  )-----------( 96       ( 25 Apr 2020 06:42 )             46.3     04-25    143  |  105  |  43<H>  ----------------------------<  157<H>  3.9   |  33<H>  |  0.91    Ca    8.0<L>      25 Apr 2020 06:42  Phos  3.8     04-25  Mg     2.7     04-25        CARDIAC MARKERS ( 24 Apr 2020 06:52 )  <0.015 ng/mL / x     / 116 U/L / x     / x          CAPILLARY BLOOD GLUCOSE            CULTURES:  Culture Results:   <10,000 CFU/mL Normal Urogenital Faith (04-21 @ 15:51)      Medications:  MEDICATIONS  (STANDING):  ALBUTerol    90 MICROgram(s) HFA Inhaler 2 Puff(s) Inhalation every 4 hours  aspirin enteric coated 81 milliGRAM(s) Oral daily  budesonide 160 MICROgram(s)/formoterol 4.5 MICROgram(s) Inhaler 2 Puff(s) Inhalation two times a day  chlorhexidine 0.12% Liquid 15 milliLiter(s) Oral Mucosa every 12 hours  clopidogrel Tablet 75 milliGRAM(s) Oral daily  dexMEDEtomidine Infusion 0.5 MICROgram(s)/kG/Hr (14.7 mL/Hr) IV Continuous <Continuous>  diltiazem    Tablet 60 milliGRAM(s) Enteral Tube every 6 hours  enoxaparin Injectable 40 milliGRAM(s) SubCutaneous every 12 hours  folic acid 1 milliGRAM(s) Oral daily  furosemide    Tablet 40 milliGRAM(s) Oral daily  gabapentin 400 milliGRAM(s) Oral three times a day  LORazepam   Injectable 2 milliGRAM(s) IV Push every 8 hours  methylPREDNISolone sodium succinate Injectable 40 milliGRAM(s) IV Push every 6 hours  multivitamin 1 Tablet(s) Oral daily  pantoprazole  Injectable 40 milliGRAM(s) IV Push daily  propofol Infusion 5 MICROgram(s)/kG/Min (3.54 mL/Hr) IV Continuous <Continuous>  thiamine 100 milliGRAM(s) Oral daily  tiotropium 18 MICROgram(s) Capsule 1 Capsule(s) Inhalation daily    MEDICATIONS  (PRN):  traZODone 100 milliGRAM(s) Oral at bedtime PRN insomnia        04-24 @ 07:01  -  04-25 @ 07:00  --------------------------------------------------------  IN: 350.4 mL / OUT: 1300 mL / NET: -949.6 mL    04-25 @ 07:01  - 04-25 @ 23:32  --------------------------------------------------------  IN: 0 mL / OUT: 1000 mL / NET: -1000 mL        RADIOLOGY/IMAGING/ECHO    < from: Xray Chest 1 View AP/PA. (04.25.20 @ 10:12) >  Impression:  1.  Stable exam      DISCRETE X-RAY DATA:  Percent of LEFT lung opacification: Clear  Percent of RIGHT lung opacification: Clear  Change in lung opacification from most recent x-ray (<=3 days): Stable  Change from prior dated 3 or more days (same admission): No Prior        Assessment/Plan:    65 year old man with a history of chronic AF, Watchman device (implanted 4/2019), HTN, CVAs, alcoholism, tobacco abuse, COPD, HFpEF (60% 12/2018), GERD, cirrhosis, left BKA following traumatic injury admitted on 4/18/2020 with  acute on chronic hypercapnic & hypoxemic respiratory failure due to suspected exacerbation of COPD and/or bronchitis.    RX for AECOPD and ETOH withdrawal      4/23  obtunded with benzo's> 7.22/87  intubated.    Acute on chronic hypercapnic resp failure     Febrile    send cultures COVID testing.  ABX if covid neg  CXR today without infiltrates      Sedate on Precedex and propofol.    Taper steroids and benzo's    B vitamins    DVT P     SBT plan to extubate   Nocturnal CPAP  VIJAY hx    Hospital day 7  Vent day  4/23            CRITICAL CARE TIME SPENT: 31 minutes assessing presenting problems of acute illness, which pose high probability of life threatening deterioration or end organ damage/dysfunction, as well as medical decision making including initiating plan of care, reviewing data, reviewing radiologic exams, discussing with multidisciplinary team,  discussing goals of care with patient/family, and writing this note.  Non-inclusive of procedures performed,

## 2020-04-25 NOTE — PROGRESS NOTE ADULT - SUBJECTIVE AND OBJECTIVE BOX
REASON FOR VISIT: AF, Rx management    HPI:  65 year old man with a history of chronic AF, Watchman device (implanted 2019), HTN, CVAs, alcoholism, tobacco abuse, COPD, HFpEF (60% 2018), GERD, cirrhosis, left BKA following traumatic injury admitted on 2020 with  acute on chronic hypercapnic & hypoxemic respiratory failure due to suspected exacerbation of COPD and/or bronchitis.    20 Patient complain of wheezing ,no sob at rest , on 4 L NC O2  despite of being on IV lasix ,  heart rate is 80 to 100s    2020:  "I'm still wheezing."  No new complaints.  2020:  Overnight events noted and case discussed with Dr Crews; sedated on vent in CICU.  20  Patient is remain intubated , his heart rate is controlled , rectus sheath hematoma without significant drop in hemoglobin level ,   20: no real changes from yesterday and remains intubated and sedated.     MEDICATIONS  (STANDING):  ALBUTerol    90 MICROgram(s) HFA Inhaler 2 Puff(s) Inhalation every 4 hours  aspirin enteric coated 81 milliGRAM(s) Oral daily  azithromycin  IVPB      azithromycin  IVPB 500 milliGRAM(s) IV Intermittent every 24 hours  budesonide 160 MICROgram(s)/formoterol 4.5 MICROgram(s) Inhaler 2 Puff(s) Inhalation two times a day  chlorhexidine 0.12% Liquid 15 milliLiter(s) Oral Mucosa every 12 hours  clopidogrel Tablet 75 milliGRAM(s) Oral daily  dexMEDEtomidine Infusion 0.5 MICROgram(s)/kG/Hr (14.7 mL/Hr) IV Continuous <Continuous>  diltiazem    Tablet 60 milliGRAM(s) Enteral Tube every 6 hours  enoxaparin Injectable 40 milliGRAM(s) SubCutaneous every 12 hours  folic acid 1 milliGRAM(s) Oral daily  furosemide    Tablet 40 milliGRAM(s) Oral daily  gabapentin 400 milliGRAM(s) Oral three times a day  LORazepam   Injectable 2 milliGRAM(s) IV Push every 8 hours  methylPREDNISolone sodium succinate Injectable 40 milliGRAM(s) IV Push every 6 hours  multivitamin 1 Tablet(s) Oral daily  pantoprazole  Injectable 40 milliGRAM(s) IV Push daily  propofol Infusion 5 MICROgram(s)/kG/Min (3.54 mL/Hr) IV Continuous <Continuous>  thiamine 100 milliGRAM(s) Oral daily  tiotropium 18 MICROgram(s) Capsule 1 Capsule(s) Inhalation daily    MEDICATIONS  (PRN):  traZODone 100 milliGRAM(s) Oral at bedtime PRN insomnia      Vital Signs Last 24 Hrs  T(C): 38.3 (2020 04:43), Max: 38.4 (2020 17:00)  T(F): 101 (2020 04:43), Max: 101.2 (2020 17:00)  HR: 81 (2020 08:00) (78 - 100)  BP: 149/93 (2020 08:00) (120/68 - 167/94)  BP(mean): 107 (2020 08:00) (81 - 119)  RR: 18 (2020 08:00) (13 - 23)  SpO2: 94% (2020 08:00) (89% - 100%)    I&O's Detail    2020 07:01  -  2020 07:00  --------------------------------------------------------  IN:    dexmedetomidine Infusion: 247.6 mL    propofol Infusion: 102.8 mL  Total IN: 350.4 mL    OUT:    Voided: 1300 mL  Total OUT: 1300 mL    Total NET: -949.6 mL          Daily     Daily Weight in k (2020 04:43))    PHYSICAL EXAM:  Constitutional: Laying in bed, sedated   Respiratory: Scattered rhonchi, rhoncurus BS  Cardiovascular: S1, S2, IR, IR   Gastrointestinal: Abdomen is soft. +BS (scant)   Extremities:  + leg edema (bilateral) with stasis erythema; s/p LLE amputation    LABS:                          15.4   8.06  )-----------( 96       ( 2020 06:42 )             46.3     04-    143  |  105  |  43<H>  ----------------------------<  157<H>  3.9   |  33<H>  |  0.91    Ca    8.0<L>      2020 06:42  Phos  3.8     04-25  Mg     2.7     04-25      CARDIAC MARKERS ( 2020 06:52 )  <0.015 ng/mL / x     / 116 U/L / x     / x        Transthoracic Echocardiogram (06.10.19 @ 20:49):   Estimated left ventricular ejection fraction is 60-65 %. The left ventricle is normal in wall thickness, wall motion and contractility as seen in limited views.   The left ventricle cavity is moderately dilated.   The left atrium is severely dilated.   The right atrium appears severely dilated.   Normal appearing right ventricle structure and function.   Moderate (2+) mitral regurgitation.   Mild to Moderate Tricuspid regurgitation.     US Duplex Venous Lower Ext Complete, Bilateral (20 @ 16:17):  No evidence of deep venous thrombosis in either lower extremity.    Tele: AF    CT Brain Stroke Protocol (20 @ 20:29):  No acute hemorrhage, mass effect or extra-axial collections.    CT Abdomen and Pelvis w/wo IV Cont (20 @ 09:09) >  1.  3 mm nonobstructing stone in the left ureterovesicular junction.  2.  7 mm nonobstructing stone in the lower pole right kidney.  3.  Subacute hemorrhage in the right rectus abdominis along the anterior sheath, measures 1.6 cm in thickness and extends from the umbilicus to the inferior myotendinous junction.  4.  Cirrhosis. Mild splenomegaly is new from the prior and suggestive of portal venous hypertension.

## 2020-04-25 NOTE — PROGRESS NOTE ADULT - ASSESSMENT
A/P: 65 male with acute respiratory failure from altered awareness and sedation  COPD  AFIB s/p watchman  CHF  HTN  ETOH abuse      Plan:   CICU    PULM: PSV wean today--failed, BPDs, Steroids decreased 4/24.  Sedate with Precedex and Diprivan    Cardio: BP stable, continue ASA, Plavix, Cardizem    Renal: Cr. Stable    GI: Feeds, PPI    PSY: Ativan ATC, Thiamine for chronic ETOH abuse and thiamine deficiency folate    Lovenox DVT Prophylaxis    D/W the patients daughter

## 2020-04-26 LAB
ANION GAP SERPL CALC-SCNC: 5 MMOL/L — SIGNIFICANT CHANGE UP (ref 5–17)
APPEARANCE UR: CLEAR — SIGNIFICANT CHANGE UP
BASE EXCESS BLDA CALC-SCNC: 5 MMOL/L — HIGH (ref -2–2)
BASE EXCESS BLDA CALC-SCNC: 6.1 MMOL/L — HIGH (ref -2–2)
BASE EXCESS BLDA CALC-SCNC: 6.3 MMOL/L — HIGH (ref -2–2)
BILIRUB UR-MCNC: NEGATIVE — SIGNIFICANT CHANGE UP
BLOOD GAS COMMENTS ARTERIAL: SIGNIFICANT CHANGE UP
BUN SERPL-MCNC: 42 MG/DL — HIGH (ref 7–23)
CALCIUM SERPL-MCNC: 8 MG/DL — LOW (ref 8.5–10.1)
CHLORIDE SERPL-SCNC: 108 MMOL/L — SIGNIFICANT CHANGE UP (ref 96–108)
CO2 SERPL-SCNC: 34 MMOL/L — HIGH (ref 22–31)
COLOR SPEC: YELLOW — SIGNIFICANT CHANGE UP
CREAT SERPL-MCNC: 0.99 MG/DL — SIGNIFICANT CHANGE UP (ref 0.5–1.3)
DIFF PNL FLD: ABNORMAL
GAS PNL BLDA: SIGNIFICANT CHANGE UP
GLUCOSE SERPL-MCNC: 157 MG/DL — HIGH (ref 70–99)
GLUCOSE UR QL: NEGATIVE MG/DL — SIGNIFICANT CHANGE UP
GRAM STN FLD: SIGNIFICANT CHANGE UP
HCO3 BLDA-SCNC: 31 MMOL/L — HIGH (ref 21–29)
HCO3 BLDA-SCNC: 33 MMOL/L — HIGH (ref 21–29)
HCO3 BLDA-SCNC: 33 MMOL/L — HIGH (ref 21–29)
HCT VFR BLD CALC: 48 % — SIGNIFICANT CHANGE UP (ref 39–50)
HGB BLD-MCNC: 15.7 G/DL — SIGNIFICANT CHANGE UP (ref 13–17)
KETONES UR-MCNC: NEGATIVE — SIGNIFICANT CHANGE UP
LEUKOCYTE ESTERASE UR-ACNC: NEGATIVE — SIGNIFICANT CHANGE UP
MAGNESIUM SERPL-MCNC: 2.8 MG/DL — HIGH (ref 1.6–2.6)
MCHC RBC-ENTMCNC: 32 PG — SIGNIFICANT CHANGE UP (ref 27–34)
MCHC RBC-ENTMCNC: 32.7 GM/DL — SIGNIFICANT CHANGE UP (ref 32–36)
MCV RBC AUTO: 97.8 FL — SIGNIFICANT CHANGE UP (ref 80–100)
NITRITE UR-MCNC: NEGATIVE — SIGNIFICANT CHANGE UP
PCO2 BLDA: 46 MMHG — SIGNIFICANT CHANGE UP (ref 32–46)
PCO2 BLDA: 56 MMHG — HIGH (ref 32–46)
PCO2 BLDA: 64 MMHG — HIGH (ref 32–46)
PH BLDA: 7.34 — LOW (ref 7.35–7.45)
PH BLDA: 7.39 — SIGNIFICANT CHANGE UP (ref 7.35–7.45)
PH BLDA: 7.44 — SIGNIFICANT CHANGE UP (ref 7.35–7.45)
PH UR: 7 — SIGNIFICANT CHANGE UP (ref 5–8)
PHOSPHATE SERPL-MCNC: 4.2 MG/DL — SIGNIFICANT CHANGE UP (ref 2.5–4.5)
PLATELET # BLD AUTO: 96 K/UL — LOW (ref 150–400)
PO2 BLDA: 73 MMHG — LOW (ref 74–108)
PO2 BLDA: 84 MMHG — SIGNIFICANT CHANGE UP (ref 74–108)
PO2 BLDA: 87 MMHG — SIGNIFICANT CHANGE UP (ref 74–108)
POTASSIUM SERPL-MCNC: 4 MMOL/L — SIGNIFICANT CHANGE UP (ref 3.5–5.3)
POTASSIUM SERPL-SCNC: 4 MMOL/L — SIGNIFICANT CHANGE UP (ref 3.5–5.3)
PROCALCITONIN SERPL-MCNC: 0.05 NG/ML — SIGNIFICANT CHANGE UP (ref 0.02–0.1)
PROT UR-MCNC: 15 MG/DL
RBC # BLD: 4.91 M/UL — SIGNIFICANT CHANGE UP (ref 4.2–5.8)
RBC # FLD: 13.1 % — SIGNIFICANT CHANGE UP (ref 10.3–14.5)
SAO2 % BLDA: 94 % — SIGNIFICANT CHANGE UP (ref 92–96)
SAO2 % BLDA: 94 % — SIGNIFICANT CHANGE UP (ref 92–96)
SAO2 % BLDA: 96 % — SIGNIFICANT CHANGE UP (ref 92–96)
SARS-COV-2 RNA SPEC QL NAA+PROBE: SIGNIFICANT CHANGE UP
SODIUM SERPL-SCNC: 147 MMOL/L — HIGH (ref 135–145)
SP GR SPEC: 1.01 — SIGNIFICANT CHANGE UP (ref 1.01–1.02)
SPECIMEN SOURCE: SIGNIFICANT CHANGE UP
UROBILINOGEN FLD QL: 4 MG/DL
WBC # BLD: 8.12 K/UL — SIGNIFICANT CHANGE UP (ref 3.8–10.5)
WBC # FLD AUTO: 8.12 K/UL — SIGNIFICANT CHANGE UP (ref 3.8–10.5)

## 2020-04-26 PROCEDURE — 99232 SBSQ HOSP IP/OBS MODERATE 35: CPT

## 2020-04-26 PROCEDURE — 99291 CRITICAL CARE FIRST HOUR: CPT

## 2020-04-26 PROCEDURE — 99233 SBSQ HOSP IP/OBS HIGH 50: CPT

## 2020-04-26 RX ORDER — ACETAMINOPHEN 500 MG
1000 TABLET ORAL ONCE
Refills: 0 | Status: COMPLETED | OUTPATIENT
Start: 2020-04-26 | End: 2020-04-26

## 2020-04-26 RX ORDER — FOLIC ACID 0.8 MG
1 TABLET ORAL DAILY
Refills: 0 | Status: DISCONTINUED | OUTPATIENT
Start: 2020-04-26 | End: 2020-04-28

## 2020-04-26 RX ORDER — FUROSEMIDE 40 MG
40 TABLET ORAL DAILY
Refills: 0 | Status: DISCONTINUED | OUTPATIENT
Start: 2020-04-26 | End: 2020-04-29

## 2020-04-26 RX ORDER — DILTIAZEM HCL 120 MG
15 CAPSULE, EXT RELEASE 24 HR ORAL ONCE
Refills: 0 | Status: COMPLETED | OUTPATIENT
Start: 2020-04-26 | End: 2020-04-26

## 2020-04-26 RX ORDER — DILTIAZEM HCL 120 MG
10 CAPSULE, EXT RELEASE 24 HR ORAL
Qty: 125 | Refills: 0 | Status: DISCONTINUED | OUTPATIENT
Start: 2020-04-26 | End: 2020-04-30

## 2020-04-26 RX ADMIN — ENOXAPARIN SODIUM 40 MILLIGRAM(S): 100 INJECTION SUBCUTANEOUS at 21:56

## 2020-04-26 RX ADMIN — Medication 60 MILLIGRAM(S): at 01:18

## 2020-04-26 RX ADMIN — Medication 2 MILLIGRAM(S): at 05:30

## 2020-04-26 RX ADMIN — Medication 60 MILLIGRAM(S): at 05:30

## 2020-04-26 RX ADMIN — ENOXAPARIN SODIUM 40 MILLIGRAM(S): 100 INJECTION SUBCUTANEOUS at 15:32

## 2020-04-26 RX ADMIN — Medication 40 MILLIGRAM(S): at 21:58

## 2020-04-26 RX ADMIN — Medication 2 MILLIGRAM(S): at 21:56

## 2020-04-26 RX ADMIN — Medication 40 MILLIGRAM(S): at 05:30

## 2020-04-26 RX ADMIN — Medication 400 MILLIGRAM(S): at 22:00

## 2020-04-26 RX ADMIN — CHLORHEXIDINE GLUCONATE 15 MILLILITER(S): 213 SOLUTION TOPICAL at 05:30

## 2020-04-26 RX ADMIN — Medication 40 MILLIGRAM(S): at 21:56

## 2020-04-26 RX ADMIN — Medication 40 MILLIGRAM(S): at 05:29

## 2020-04-26 RX ADMIN — PANTOPRAZOLE SODIUM 40 MILLIGRAM(S): 20 TABLET, DELAYED RELEASE ORAL at 15:32

## 2020-04-26 RX ADMIN — Medication 15 MILLIGRAM(S): at 12:00

## 2020-04-26 RX ADMIN — Medication 40 MILLIGRAM(S): at 15:32

## 2020-04-26 RX ADMIN — Medication 40 MILLIGRAM(S): at 01:18

## 2020-04-26 NOTE — PROGRESS NOTE ADULT - ASSESSMENT
PROBLEMS;    Ac on chronic hypercapnic & hypoxamic respiratory failure-s/p extubation-worsening ventilation  afib with RVR  OHS/VIJAY  AC flare of COPD/chronic vs acute on chronic bronchitis  Mild interstitial lung disease-NSIP h/o smoking  COVID negativex2  Pulmonary hypertension  Nonobstructing stone in the left ureterovesicular junction/ nonobstructing stone in the lower pole right kidney.  Subacute hemorrhage in the right rectus abdominis along the anterior sheath, measures 1.6 cm in thickness and extends from the umbilicus to the inferior myotendinous junction  Cirrhosis  Mild splenomegaly-portal venous hypertension.  Chronic afib w Watchman device  CHF  BPH  GERD  ETOH abuse  seizures disorder    PLAN;    s/p extubation-worsening ABG trial of bipap & repeat ABg d/w Dr sheehan & staff  iv solumedrols 40mg q6hr  iv cardizem for rapid afib  off azithromycin  aerosols  supportive care  covid repeat testing-neg  d/w staff

## 2020-04-26 NOTE — PROGRESS NOTE ADULT - SUBJECTIVE AND OBJECTIVE BOX
REASON FOR VISIT: AF, Rx management    HPI:  65 year old man with a history of chronic AF, Watchman device (implanted 2019), HTN, CVAs, alcoholism, tobacco abuse, COPD, HFpEF (60% 2018), GERD, cirrhosis, left BKA following traumatic injury admitted on 2020 with  acute on chronic hypercapnic & hypoxemic respiratory failure due to suspected exacerbation of COPD and/or bronchitis.    20 Patient complain of wheezing ,no sob at rest , on 4 L NC O2  despite of being on IV lasix ,  heart rate is 80 to 100s    2020:  "I'm still wheezing."  No new complaints.  2020:  Overnight events noted and case discussed with Dr Crews; sedated on vent in CICU.  20  Patient is remain intubated , his heart rate is controlled , rectus sheath hematoma without significant drop in hemoglobin level ,   20: no real changes from yesterday and remains intubated and sedated.   20: Mental status not improved and now extubated.  In rapid atrial fib AM.  Cannot take po due to mental status.      MEDICATIONS  (STANDING):  ALBUTerol    90 MICROgram(s) HFA Inhaler 2 Puff(s) Inhalation every 4 hours  aspirin enteric coated 81 milliGRAM(s) Oral daily  budesonide 160 MICROgram(s)/formoterol 4.5 MICROgram(s) Inhaler 2 Puff(s) Inhalation two times a day  chlorhexidine 0.12% Liquid 15 milliLiter(s) Oral Mucosa every 12 hours  clopidogrel Tablet 75 milliGRAM(s) Oral daily  dexMEDEtomidine Infusion 0.5 MICROgram(s)/kG/Hr (14.7 mL/Hr) IV Continuous <Continuous>  diltiazem    Tablet 60 milliGRAM(s) Enteral Tube every 6 hours  diltiazem Infusion 10 mG/Hr (10 mL/Hr) IV Continuous <Continuous>  diltiazem Injectable 15 milliGRAM(s) IV Push once  enoxaparin Injectable 40 milliGRAM(s) SubCutaneous every 12 hours  folic acid 1 milliGRAM(s) Oral daily  furosemide    Tablet 40 milliGRAM(s) Oral daily  gabapentin 400 milliGRAM(s) Oral three times a day  LORazepam   Injectable 2 milliGRAM(s) IV Push every 8 hours  methylPREDNISolone sodium succinate Injectable 40 milliGRAM(s) IV Push every 6 hours  multivitamin 1 Tablet(s) Oral daily  pantoprazole  Injectable 40 milliGRAM(s) IV Push daily  propofol Infusion 5 MICROgram(s)/kG/Min (3.54 mL/Hr) IV Continuous <Continuous>  thiamine 100 milliGRAM(s) Oral daily  tiotropium 18 MICROgram(s) Capsule 1 Capsule(s) Inhalation daily    MEDICATIONS  (PRN):  traZODone 100 milliGRAM(s) Oral at bedtime PRN insomnia      Vital Signs Last 24 Hrs  T(C): 37.5 (2020 04:05), Max: 39.5 (2020 00:02)  T(F): 99.5 (2020 04:05), Max: 103.1 (2020 00:02)  HR: 73 (2020 09:00) (67 - 102)  BP: 157/80 (2020 09:00) (112/73 - 159/74)  BP(mean): 99 (2020 09:00) (83 - 104)  RR: 15 (2020 09:00) (5 - 26)  SpO2: 97% (2020 09:00) (89% - 97%)    I&O's Detail    2020 07:01  -  2020 07:00  --------------------------------------------------------  IN:  Total IN: 0 mL    OUT:    Voided: 2450 mL  Total OUT: 2450 mL    Total NET: -2450 mL          Daily     Daily Weight in k.5 (2020 06:54)    PHYSICAL EXAM:  Constitutional: Laying in bed, no responsive to questions  Respiratory: Scattered rhonchi, rhoncurus BS  Cardiovascular: S1, S2, IR, IR   Gastrointestinal: Abdomen is soft. +BS (scant)   Extremities:  + leg edema (bilateral) with stasis erythema; s/p LLE amputation    LABS:                          15.7   8.12  )-----------( 96       ( 2020 06:18 )             48.0     04-26    147<H>  |  108  |  42<H>  ----------------------------<  157<H>  4.0   |  34<H>  |  0.99    Ca    8.0<L>      2020 06:18  Phos  4.2       Mg     2.8         Transthoracic Echocardiogram (06.10.19 @ 20:49):   Estimated left ventricular ejection fraction is 60-65 %. The left ventricle is normal in wall thickness, wall motion and contractility as seen in limited views.   The left ventricle cavity is moderately dilated.   The left atrium is severely dilated.   The right atrium appears severely dilated.   Normal appearing right ventricle structure and function.   Moderate (2+) mitral regurgitation.   Mild to Moderate Tricuspid regurgitation.     US Duplex Venous Lower Ext Complete, Bilateral (20 @ 16:17):  No evidence of deep venous thrombosis in either lower extremity.    Tele: AF    CT Brain Stroke Protocol (20 @ 20:29):  No acute hemorrhage, mass effect or extra-axial collections.    CT Abdomen and Pelvis w/wo IV Cont (20 @ 09:09) >  1.  3 mm nonobstructing stone in the left ureterovesicular junction.  2.  7 mm nonobstructing stone in the lower pole right kidney.  3.  Subacute hemorrhage in the right rectus abdominis along the anterior sheath, measures 1.6 cm in thickness and extends from the umbilicus to the inferior myotendinous junction.  4.  Cirrhosis. Mild splenomegaly is new from the prior and suggestive of portal venous hypertension.

## 2020-04-26 NOTE — PROGRESS NOTE ADULT - SUBJECTIVE AND OBJECTIVE BOX
Interval History:  20: Patient extubated about 10 minutes prior to exam.     MEDICATIONS  (STANDING):  ALBUTerol    90 MICROgram(s) HFA Inhaler 2 Puff(s) Inhalation every 4 hours  aspirin enteric coated 81 milliGRAM(s) Oral daily  budesonide 160 MICROgram(s)/formoterol 4.5 MICROgram(s) Inhaler 2 Puff(s) Inhalation two times a day  chlorhexidine 0.12% Liquid 15 milliLiter(s) Oral Mucosa every 12 hours  clopidogrel Tablet 75 milliGRAM(s) Oral daily  dexMEDEtomidine Infusion 0.5 MICROgram(s)/kG/Hr (14.7 mL/Hr) IV Continuous <Continuous>  diltiazem    Tablet 60 milliGRAM(s) Enteral Tube every 6 hours  enoxaparin Injectable 40 milliGRAM(s) SubCutaneous every 12 hours  folic acid 1 milliGRAM(s) Oral daily  furosemide    Tablet 40 milliGRAM(s) Oral daily  gabapentin 400 milliGRAM(s) Oral three times a day  LORazepam   Injectable 2 milliGRAM(s) IV Push every 8 hours  methylPREDNISolone sodium succinate Injectable 40 milliGRAM(s) IV Push every 6 hours  multivitamin 1 Tablet(s) Oral daily  pantoprazole  Injectable 40 milliGRAM(s) IV Push daily  propofol Infusion 5 MICROgram(s)/kG/Min (3.54 mL/Hr) IV Continuous <Continuous>  thiamine 100 milliGRAM(s) Oral daily  tiotropium 18 MICROgram(s) Capsule 1 Capsule(s) Inhalation daily    MEDICATIONS  (PRN):  traZODone 100 milliGRAM(s) Oral at bedtime PRN insomnia      Allergies    Ceclor (Rash)  Duricef (Rash)    Intolerances        PHYSICAL EXAM:  Vital Signs Last 24 Hrs  T(F): 99.5 (20 @ 04:05)  HR: 73 (20 @ 09:00)  BP: 157/80 (20 @ 09:00)  RR: 15 (20 @ 09:00)    GENERAL: NAD, well-groomed, well-developed  HEAD:  Atraumatic, Normocephalic    Neuro:  Propofol stopped about 15 minutes prior to exam.  Precedex still on  Extubated but remains unresponsive. Not following commands  Pupils equally round and reactive to light  + oculocephalics  + corneals  no withdrawal to noxious stimuli    LABS:                        15.7   8.12  )-----------( 96       ( 2020 06:18 )             48.0     -    147<H>  |  108  |  42<H>  ----------------------------<  157<H>  4.0   |  34<H>  |  0.99    Ca    8.0<L>      2020 06:18  Phos  4.2       Mg     2.8             Urinalysis Basic - ( 2020 05:15 )    Color: Yellow / Appearance: Clear / S.015 / pH: x  Gluc: x / Ketone: Negative  / Bili: Negative / Urobili: 4 mg/dL   Blood: x / Protein: 15 mg/dL / Nitrite: Negative   Leuk Esterase: Negative / RBC: 11-25 /HPF / WBC 3-5   Sq Epi: x / Non Sq Epi: Negative / Bacteria: Few        RADIOLOGY & ADDITIONAL STUDIES:      CT Head  < from: CT Brain Stroke Protocol (20 @ 20:29) >  Impression:    No acute hemorrhage, mass effect or extra-axial collections.      < from: CT Angio Neck w/ IV Cont (20 @ 21:58) >  IMPRESSION:  Patent cervical and intracranial major arterial vasculature without evidence of abrupt vessel cut off, hemodynamically significant stenosis, aneurysm, or dissection.    EEG :   < from: EEG Awake and Asleep (20 @ 10:30) >  Impression:  This is an mild abnormal EEG due to the presence of  1. Mild generalized background slowing may be on the basis of underlying cerebral dysfunction may be on the basis of underlying metabolic, toxic or structural pathology or due to sedation.  2. Excess beta activity noted related to medication effect. Clinical correlation recommended.  3. No epileptiform activity noted. Clinical correlation recommended.  Repeat study without sedation if clinically indicated.

## 2020-04-26 NOTE — PROGRESS NOTE ADULT - SUBJECTIVE AND OBJECTIVE BOX
Subjective:    pat s/p extubated, sat 93%, lathergic, tachycardic 130s-R afib.    Home Medications:  albuterol 2.5 mg/3 mL (0.083%) inhalation solution: 3 milliliter(s) inhaled every 6 hours, As Needed -for shortness of breath and/or wheezing (2020 03:12)  gabapentin 400 mg oral capsule: 1 cap(s) orally 3 times a day (2020 03:12)  pantoprazole 40 mg oral granule, enteric coated: 40 milligram(s) orally once a day (2020 03:12)  Spiriva 18 mcg inhalation capsule: 1 cap(s) inhaled once a day (2020 03:12)  tamsulosin 0.4 mg oral capsule: 1 cap(s) orally once a day (at bedtime) (2020 03:12)  traZODone 50 mg oral tablet: 2 tab(s) orally once a day (at bedtime), As Needed (2020 03:12)    MEDICATIONS  (STANDING):  ALBUTerol    90 MICROgram(s) HFA Inhaler 2 Puff(s) Inhalation every 4 hours  aspirin enteric coated 81 milliGRAM(s) Oral daily  budesonide 160 MICROgram(s)/formoterol 4.5 MICROgram(s) Inhaler 2 Puff(s) Inhalation two times a day  chlorhexidine 0.12% Liquid 15 milliLiter(s) Oral Mucosa every 12 hours  clopidogrel Tablet 75 milliGRAM(s) Oral daily  dexMEDEtomidine Infusion 0.5 MICROgram(s)/kG/Hr (14.7 mL/Hr) IV Continuous <Continuous>  diltiazem    Tablet 60 milliGRAM(s) Enteral Tube every 6 hours  diltiazem Infusion 10 mG/Hr (10 mL/Hr) IV Continuous <Continuous>  diltiazem Injectable 15 milliGRAM(s) IV Push once  enoxaparin Injectable 40 milliGRAM(s) SubCutaneous every 12 hours  folic acid 1 milliGRAM(s) Oral daily  furosemide    Tablet 40 milliGRAM(s) Oral daily  gabapentin 400 milliGRAM(s) Oral three times a day  LORazepam   Injectable 2 milliGRAM(s) IV Push every 8 hours  methylPREDNISolone sodium succinate Injectable 40 milliGRAM(s) IV Push every 6 hours  multivitamin 1 Tablet(s) Oral daily  pantoprazole  Injectable 40 milliGRAM(s) IV Push daily  propofol Infusion 5 MICROgram(s)/kG/Min (3.54 mL/Hr) IV Continuous <Continuous>  thiamine 100 milliGRAM(s) Oral daily  tiotropium 18 MICROgram(s) Capsule 1 Capsule(s) Inhalation daily    MEDICATIONS  (PRN):  traZODone 100 milliGRAM(s) Oral at bedtime PRN insomnia      Allergies    Ceclor (Rash)  Duricef (Rash)    Intolerances        Vital Signs Last 24 Hrs  T(C): 36.7 (2020 13:26), Max: 39.5 (2020 00:02)  T(F): 98 (2020 13:26), Max: 103.1 (2020 00:02)  HR: 137 (2020 13:) (67 - 137)  BP: 133/88 (2020 13:) (110/61 - 159/74)  BP(mean): 92 (2020 13:00) (71 - 107)  RR: 31 (2020 13:) (5 - 36)  SpO2: 93% (2020 13:) (89% - 97%)  Mode: AC/ CMV (Assist Control/ Continuous Mandatory Ventilation)  RR (machine): 12  TV (machine): 550  FiO2: 30  PEEP: 5  PIP: 29      PHYSICAL EXAMINATION:    NECK:  Supple. No lymphadenopathy. Jugular venous pressure not elevated. Carotids equal.   HEART:   The cardiac impulse has a normal quality. Reg., Nl S1 and S2.  There are no murmurs, rubs or gallops noted  CHEST:  Chest poor air entry to auscultation. Normal respiratory effort.  ABDOMEN:  Soft and nontender.   EXTREMITIES:  There is no edema.       LABS:                        15.7   8.12  )-----------( 96       ( 2020 06:18 )             48.0     04-26    147<H>  |  108  |  42<H>  ----------------------------<  157<H>  4.0   |  34<H>  |  0.99    Ca    8.0<L>      2020 06:18  Phos  4.2     -26  Mg     2.8     04-26        Urinalysis Basic - ( 2020 05:15 )    Color: Yellow / Appearance: Clear / S.015 / pH: x  Gluc: x / Ketone: Negative  / Bili: Negative / Urobili: 4 mg/dL   Blood: x / Protein: 15 mg/dL / Nitrite: Negative   Leuk Esterase: Negative / RBC: 11-25 /HPF / WBC 3-5   Sq Epi: x / Non Sq Epi: Negative / Bacteria: Few    Blood Gas Profile - Arterial in AM (20 @ 06:47)    pH, Arterial: 7.44    pCO2, Arterial: 46 mmHg    pO2, Arterial: 73 mmHg    HCO3, Arterial: 31 mmoL/L    Base Excess, Arterial: 6.3 mmol/L    Oxygen Saturation, Arterial: 94 %    Blood Gas Comments Arterial: FIO2:_  MODE:_   VT:_   RATE:_   PEEP:_  Comment:_12/550/30%+5    Blood Gas Source Arterial: Arterial    ABG - ( 2020 11:13 )  pH, Arterial: 7.34  pH, Blood: x     /  pCO2: 64    /  pO2: 84    / HCO3: 33    / Base Excess: 5.0   /  SaO2: 94

## 2020-04-26 NOTE — CHART NOTE - NSCHARTNOTEFT_GEN_A_CORE
Spoke with patient's daughter Adela [phone]. Updated on clinic condition. Now extubated. Went into rapid a.fib. All questions answered. thankful for call.

## 2020-04-26 NOTE — PROGRESS NOTE ADULT - SUBJECTIVE AND OBJECTIVE BOX
Patient seen at a  this morning for AMS and hypoxic  Patient had been admitted with a COPD exacerbation.  It was believed that he went into ETOH withdrawal and had a total of 6MG Ativan over night.  He required intubation this morning.        : he remains intubated.  Will try to wean this afternoon.      : He weaned this morning and failed with a decreased Tv.  But close to extubation.    : Patient weaned well this morning and was extubated.            PAST MEDICAL HX  Alcohol abuse    Alcohol withdrawal    Anemia    AFIB atrial fibrillation    CHF (congestive heart failure)    Chronic atrial fibrillation    Chronic CHF    Chronic obstructive pulmonary disease (COPD)    Cirrhosis    Collapsed lung    COPD without exacerbation    Emphysema of lung    Falls    GERD (gastroesophageal reflux disease)    HTN hypertension    Meningitis    Poor historian    Presence of Watchman left atrial appendage closure device.    PAST SURGICAL HX  Presence of Watchman left atrial appendage closure device    S/P BKA (below knee amputation) unilateral, left    Unilateral amputation of lower extremity below knee.      FAMILY HX  Family history unknown: Adopted      SOCIAL HX  lives alone  former smoker  alcohol use last 2-3 days ago (2020 03:19)       PAST MEDICAL & SURGICAL HISTORY:  Chronic CHF  COPD without exacerbation  Atrial fibrillation  Presence of Watchman left atrial appendage closure device  Hypertension  GERD (gastroesophageal reflux disease)  Chronic obstructive pulmonary disease (COPD)  Anemia  Falls  Meningitis  Collapsed lung  Alcohol withdrawal  Emphysema of lung  Cirrhosis  CHF (congestive heart failure)  Poor historian  Alcohol abuse  Chronic atrial fibrillation  Unilateral amputation of lower extremity below knee  Presence of Watchman left atrial appendage closure device  S/P BKA (below knee amputation) unilateral, left      FAMILY HISTORY:  No pertinent family history in first degree relatives  Family history unknown: Adopted      Social Hx:    Allergies    Ceclor (Rash)  Duricef (Rash)    Intolerances            ICU Vital Signs Last 24 Hrs  T(C): 36.7 (2020 13:26), Max: 39.5 (2020 00:02)  T(F): 98 (2020 13:26), Max: 103.1 (2020 00:02)  HR: 137 (2020 13:00) (67 - 137)  BP: 133/88 (2020 13:00) (110/61 - 159/74)  BP(mean): 92 (2020 13:00) (71 - 107)  ABP: --  ABP(mean): --  RR: 31 (2020 13:00) (5 - 36)  SpO2: 93% (2020 13:00) (89% - 97%)      Mode: AC/ CMV (Assist Control/ Continuous Mandatory Ventilation)  RR (machine): 12  TV (machine): 550  FiO2: 30  PEEP: 5  PIP: 29      I&O's Summary    2020 07:01  -  2020 07:00  --------------------------------------------------------  IN: 0 mL / OUT: 2450 mL / NET: -2450 mL                              15.7   8.12  )-----------( 96       ( 2020 06:18 )             48.0           147<H>  |  108  |  42<H>  ----------------------------<  157<H>  4.0   |  34<H>  |  0.99    Ca    8.0<L>      2020 06:18  Phos  4.2       Mg     2.8                   ABG - ( 2020 11:13 )  pH, Arterial: 7.34  pH, Blood: x     /  pCO2: 64    /  pO2: 84    / HCO3: 33    / Base Excess: 5.0   /  SaO2: 94                  Urinalysis Basic - ( 2020 05:15 )    Color: Yellow / Appearance: Clear / S.015 / pH: x  Gluc: x / Ketone: Negative  / Bili: Negative / Urobili: 4 mg/dL   Blood: x / Protein: 15 mg/dL / Nitrite: Negative   Leuk Esterase: Negative / RBC: 11-25 /HPF / WBC 3-5   Sq Epi: x / Non Sq Epi: Negative / Bacteria: Few        MEDICATIONS  (STANDING):  ALBUTerol    90 MICROgram(s) HFA Inhaler 2 Puff(s) Inhalation every 4 hours  aspirin enteric coated 81 milliGRAM(s) Oral daily  budesonide 160 MICROgram(s)/formoterol 4.5 MICROgram(s) Inhaler 2 Puff(s) Inhalation two times a day  clopidogrel Tablet 75 milliGRAM(s) Oral daily  dexMEDEtomidine Infusion 0.5 MICROgram(s)/kG/Hr (14.7 mL/Hr) IV Continuous <Continuous>  diltiazem    Tablet 60 milliGRAM(s) Enteral Tube every 6 hours  diltiazem Infusion 10 mG/Hr (10 mL/Hr) IV Continuous <Continuous>  enoxaparin Injectable 40 milliGRAM(s) SubCutaneous every 12 hours  folic acid 1 milliGRAM(s) Oral daily  furosemide    Tablet 40 milliGRAM(s) Oral daily  gabapentin 400 milliGRAM(s) Oral three times a day  LORazepam   Injectable 2 milliGRAM(s) IV Push every 8 hours  methylPREDNISolone sodium succinate Injectable 40 milliGRAM(s) IV Push every 6 hours  multivitamin 1 Tablet(s) Oral daily  pantoprazole  Injectable 40 milliGRAM(s) IV Push daily  propofol Infusion 5 MICROgram(s)/kG/Min (3.54 mL/Hr) IV Continuous <Continuous>  thiamine 100 milliGRAM(s) Oral daily  tiotropium 18 MICROgram(s) Capsule 1 Capsule(s) Inhalation daily    MEDICATIONS  (PRN):  traZODone 100 milliGRAM(s) Oral at bedtime PRN insomnia      DVT Prophylaxis:    Advanced Directives:  Discussed with:    Visit Information: 30     ** Time is exclusive of billed procedures and/or teaching and/or routine family updates.

## 2020-04-26 NOTE — PROGRESS NOTE ADULT - ASSESSMENT
A/P: 65 male with acute respiratory failure from altered awareness and sedation  COPD  AFIB s/p watchman  CHF  HTN  ETOH abuse      Plan:   CICU    PULM: Extubated today-- BPDs, Steroids decreased 4/24.  Repeat ABG at 4PM    Cardio: BP stable, continue ASA, Plavix, Cardizem--Added Cardizem drip for R-AFIB    Renal: Cr. Stable    GI: Feeds, PPI    PSY: Ativan ATC, Thiamine for chronic ETOH abuse and thiamine deficiency folate    Lovenox DVT Prophylaxis    D/W the patients daughter

## 2020-04-26 NOTE — PROGRESS NOTE ADULT - ASSESSMENT
· Assessment		  65 year old male w chronic AFib w Watchman device, CHF, COPD, HTN, alcohol abuse, obesity came to ED w EMS c/o SOB since the morning of 04-18.  Per EMS, pt was in rapid afib in 180s upon EMS arrival with O2 sat in 80 acute respiratory failure with hypercapnia due to COPD exacerbation .    Neurology called after episode of altered mental status and slurred speech on 4/22.    -CT head negative for any acute stroke.  -Likely hypoxic/metabolic encephalopathy  -EEG showing evidence of encephalopathy but no epileptiform activity.   -Repeat CT head or MRI when stable  -Sedation stopped this morning. Will continue to observe  -Remains on CIWA protocol. May require more benzos now that propofol discontinued.    *AF with RVR   Per Cardiology  -Not on Ac with Watchman procedure.  -continue present meds      * COPD/Respiratory failure  Per Pulmonologist    Will follow up as needed.

## 2020-04-27 DIAGNOSIS — R31.29 OTHER MICROSCOPIC HEMATURIA: ICD-10-CM

## 2020-04-27 DIAGNOSIS — N40.0 BENIGN PROSTATIC HYPERPLASIA WITHOUT LOWER URINARY TRACT SYMPMS: ICD-10-CM

## 2020-04-27 DIAGNOSIS — N20.1 CALCULUS OF URETER: ICD-10-CM

## 2020-04-27 LAB
ANION GAP SERPL CALC-SCNC: 6 MMOL/L — SIGNIFICANT CHANGE UP (ref 5–17)
BUN SERPL-MCNC: 48 MG/DL — HIGH (ref 7–23)
CALCIUM SERPL-MCNC: 8.4 MG/DL — LOW (ref 8.5–10.1)
CHLORIDE SERPL-SCNC: 111 MMOL/L — HIGH (ref 96–108)
CO2 SERPL-SCNC: 35 MMOL/L — HIGH (ref 22–31)
CREAT SERPL-MCNC: 1.06 MG/DL — SIGNIFICANT CHANGE UP (ref 0.5–1.3)
GLUCOSE SERPL-MCNC: 152 MG/DL — HIGH (ref 70–99)
HCT VFR BLD CALC: 52.5 % — HIGH (ref 39–50)
HGB BLD-MCNC: 17.2 G/DL — HIGH (ref 13–17)
MAGNESIUM SERPL-MCNC: 2.8 MG/DL — HIGH (ref 1.6–2.6)
MCHC RBC-ENTMCNC: 32.1 PG — SIGNIFICANT CHANGE UP (ref 27–34)
MCHC RBC-ENTMCNC: 32.8 GM/DL — SIGNIFICANT CHANGE UP (ref 32–36)
MCV RBC AUTO: 97.9 FL — SIGNIFICANT CHANGE UP (ref 80–100)
PHOSPHATE SERPL-MCNC: 3.2 MG/DL — SIGNIFICANT CHANGE UP (ref 2.5–4.5)
PLATELET # BLD AUTO: 131 K/UL — LOW (ref 150–400)
POTASSIUM SERPL-MCNC: 3.3 MMOL/L — LOW (ref 3.5–5.3)
POTASSIUM SERPL-SCNC: 3.3 MMOL/L — LOW (ref 3.5–5.3)
RBC # BLD: 5.36 M/UL — SIGNIFICANT CHANGE UP (ref 4.2–5.8)
RBC # FLD: 13.4 % — SIGNIFICANT CHANGE UP (ref 10.3–14.5)
SODIUM SERPL-SCNC: 152 MMOL/L — HIGH (ref 135–145)
WBC # BLD: 12.86 K/UL — HIGH (ref 3.8–10.5)
WBC # FLD AUTO: 12.86 K/UL — HIGH (ref 3.8–10.5)

## 2020-04-27 PROCEDURE — 99231 SBSQ HOSP IP/OBS SF/LOW 25: CPT

## 2020-04-27 PROCEDURE — 99233 SBSQ HOSP IP/OBS HIGH 50: CPT

## 2020-04-27 PROCEDURE — 99232 SBSQ HOSP IP/OBS MODERATE 35: CPT

## 2020-04-27 RX ORDER — DIGOXIN 250 MCG
0.25 TABLET ORAL EVERY 4 HOURS
Refills: 0 | Status: COMPLETED | OUTPATIENT
Start: 2020-04-27 | End: 2020-04-27

## 2020-04-27 RX ORDER — SODIUM CHLORIDE 9 MG/ML
1000 INJECTION, SOLUTION INTRAVENOUS
Refills: 0 | Status: DISCONTINUED | OUTPATIENT
Start: 2020-04-27 | End: 2020-04-27

## 2020-04-27 RX ORDER — ACETAMINOPHEN 500 MG
1000 TABLET ORAL ONCE
Refills: 0 | Status: COMPLETED | OUTPATIENT
Start: 2020-04-27 | End: 2020-04-27

## 2020-04-27 RX ORDER — POTASSIUM CHLORIDE 20 MEQ
10 PACKET (EA) ORAL
Refills: 0 | Status: COMPLETED | OUTPATIENT
Start: 2020-04-27 | End: 2020-04-27

## 2020-04-27 RX ORDER — DIGOXIN 250 MCG
0.12 TABLET ORAL DAILY
Refills: 0 | Status: DISCONTINUED | OUTPATIENT
Start: 2020-04-28 | End: 2020-05-03

## 2020-04-27 RX ADMIN — ENOXAPARIN SODIUM 40 MILLIGRAM(S): 100 INJECTION SUBCUTANEOUS at 10:26

## 2020-04-27 RX ADMIN — BUDESONIDE AND FORMOTEROL FUMARATE DIHYDRATE 2 PUFF(S): 160; 4.5 AEROSOL RESPIRATORY (INHALATION) at 21:49

## 2020-04-27 RX ADMIN — Medication 0.25 MILLIGRAM(S): at 17:46

## 2020-04-27 RX ADMIN — ENOXAPARIN SODIUM 40 MILLIGRAM(S): 100 INJECTION SUBCUTANEOUS at 21:39

## 2020-04-27 RX ADMIN — PANTOPRAZOLE SODIUM 40 MILLIGRAM(S): 20 TABLET, DELAYED RELEASE ORAL at 10:26

## 2020-04-27 RX ADMIN — GABAPENTIN 400 MILLIGRAM(S): 400 CAPSULE ORAL at 21:39

## 2020-04-27 RX ADMIN — Medication 0.25 MILLIGRAM(S): at 14:35

## 2020-04-27 RX ADMIN — Medication 100 MILLIEQUIVALENT(S): at 17:47

## 2020-04-27 RX ADMIN — Medication 1 MILLIGRAM(S): at 12:18

## 2020-04-27 RX ADMIN — Medication 400 MILLIGRAM(S): at 16:27

## 2020-04-27 RX ADMIN — GABAPENTIN 400 MILLIGRAM(S): 400 CAPSULE ORAL at 12:18

## 2020-04-27 RX ADMIN — Medication 40 MILLIGRAM(S): at 04:28

## 2020-04-27 RX ADMIN — Medication 100 MILLIEQUIVALENT(S): at 16:30

## 2020-04-27 RX ADMIN — Medication 2 MILLIGRAM(S): at 04:28

## 2020-04-27 RX ADMIN — Medication 10 MG/HR: at 20:09

## 2020-04-27 RX ADMIN — Medication 40 MILLIGRAM(S): at 17:47

## 2020-04-27 RX ADMIN — Medication 2 MILLIGRAM(S): at 12:18

## 2020-04-27 RX ADMIN — ALBUTEROL 2 PUFF(S): 90 AEROSOL, METERED ORAL at 23:30

## 2020-04-27 RX ADMIN — Medication 2 MILLIGRAM(S): at 21:39

## 2020-04-27 RX ADMIN — ALBUTEROL 2 PUFF(S): 90 AEROSOL, METERED ORAL at 21:49

## 2020-04-27 RX ADMIN — Medication 100 MILLIEQUIVALENT(S): at 19:37

## 2020-04-27 RX ADMIN — Medication 4 MILLIGRAM(S): at 16:27

## 2020-04-27 RX ADMIN — Medication 40 MILLIGRAM(S): at 04:29

## 2020-04-27 RX ADMIN — Medication 0.25 MILLIGRAM(S): at 10:26

## 2020-04-27 NOTE — PROGRESS NOTE ADULT - SUBJECTIVE AND OBJECTIVE BOX
Subjective:    pat better, more responsive, ABG - ( 2020 15:59 )  pH, Arterial: 7.39  pH, Blood: x     /  pCO2: 56    /  pO2: 87    / HCO3: 33    / Base Excess: 6.1   /  SaO2: 96        Home Medications:  albuterol 2.5 mg/3 mL (0.083%) inhalation solution: 3 milliliter(s) inhaled every 6 hours, As Needed -for shortness of breath and/or wheezing (2020 03:12)  gabapentin 400 mg oral capsule: 1 cap(s) orally 3 times a day (2020 03:12)  pantoprazole 40 mg oral granule, enteric coated: 40 milligram(s) orally once a day (2020 03:12)  Spiriva 18 mcg inhalation capsule: 1 cap(s) inhaled once a day (2020 03:12)  tamsulosin 0.4 mg oral capsule: 1 cap(s) orally once a day (at bedtime) (2020 03:12)  traZODone 50 mg oral tablet: 2 tab(s) orally once a day (at bedtime), As Needed (2020 03:12)    MEDICATIONS  (STANDING):  ALBUTerol    90 MICROgram(s) HFA Inhaler 2 Puff(s) Inhalation every 4 hours  aspirin enteric coated 81 milliGRAM(s) Oral daily  budesonide 160 MICROgram(s)/formoterol 4.5 MICROgram(s) Inhaler 2 Puff(s) Inhalation two times a day  clopidogrel Tablet 75 milliGRAM(s) Oral daily  digoxin  Injectable 0.25 milliGRAM(s) IV Push every 4 hours  diltiazem Infusion 10 mG/Hr (10 mL/Hr) IV Continuous <Continuous>  enoxaparin Injectable 40 milliGRAM(s) SubCutaneous every 12 hours  folic acid Injectable 1 milliGRAM(s) IV Push daily  furosemide   Injectable 40 milliGRAM(s) IV Push daily  gabapentin 400 milliGRAM(s) Oral three times a day  LORazepam   Injectable 2 milliGRAM(s) IV Push every 8 hours  methylPREDNISolone sodium succinate Injectable 40 milliGRAM(s) IV Push every 12 hours  multivitamin 1 Tablet(s) Oral daily  pantoprazole  Injectable 40 milliGRAM(s) IV Push daily  thiamine 100 milliGRAM(s) Oral daily  tiotropium 18 MICROgram(s) Capsule 1 Capsule(s) Inhalation daily    MEDICATIONS  (PRN):  traZODone 100 milliGRAM(s) Oral at bedtime PRN insomnia      Allergies    Ceclor (Rash)  Duricef (Rash)    Intolerances        Vital Signs Last 24 Hrs  T(C): 38.1 (2020 06:23), Max: 38.2 (2020 04:19)  T(F): 100.6 (2020 06:23), Max: 100.8 (2020 04:19)  HR: 128 (2020 12:00) (122 - 149)  BP: 166/91 (2020 12:00) (112/45 - 189/79)  BP(mean): 108 (2020 12:00) (60 - 137)  RR: 28 (2020 12:00) (18 - 37)  SpO2: 94% (2020 12:00) (91% - 97%)      PHYSICAL EXAMINATION:    NECK:  Supple. No lymphadenopathy. Jugular venous pressure not elevated. Carotids equal.   HEART:   The cardiac impulse has a normal quality. Reg., Nl S1 and S2.  There are no murmurs, rubs or gallops noted  CHEST:  Chest rhonchi to auscultation. Normal respiratory effort.  ABDOMEN:  Soft and nontender.   EXTREMITIES:  There is no edema.       LABS:                        17.2   12.86 )-----------( 131      ( 2020 06:53 )             52.5     -27    152<H>  |  111<H>  |  48<H>  ----------------------------<  152<H>  3.3<L>   |  35<H>  |  1.06    Ca    8.4<L>      2020 06:53  Phos  3.2       Mg     2.8             Urinalysis Basic - ( 2020 05:15 )    Color: Yellow / Appearance: Clear / S.015 / pH: x  Gluc: x / Ketone: Negative  / Bili: Negative / Urobili: 4 mg/dL   Blood: x / Protein: 15 mg/dL / Nitrite: Negative   Leuk Esterase: Negative / RBC: 11-25 /HPF / WBC 3-5   Sq Epi: x / Non Sq Epi: Negative / Bacteria: Few

## 2020-04-27 NOTE — PROGRESS NOTE ADULT - SUBJECTIVE AND OBJECTIVE BOX
· Subjective and Objective: 	  Patient seen at a  this morning for AMS and hypoxic  Patient had been admitted with a COPD exacerbation.  It was believed that he went into ETOH withdrawal and had a total of 6MG Ativan over night.  He required intubation this morning.        : he remains intubated.  Will try to wean this afternoon.      : He weaned this morning and failed with a decreased Tv.  But close to extubation.    : Patient weaned well this morning and was extubated.    : digoxin ordered for rate control by cardio, awaiting speech and swallow exam to initiate possible PO feeds, d/c steroids.          PAST MEDICAL HX  Alcohol abuse    Alcohol withdrawal    Anemia    AFIB atrial fibrillation    CHF (congestive heart failure)    Chronic atrial fibrillation    Chronic CHF    Chronic obstructive pulmonary disease (COPD)    Cirrhosis    Collapsed lung    COPD without exacerbation    Emphysema of lung    Falls    GERD (gastroesophageal reflux disease)    HTN hypertension    Meningitis    Poor historian    Presence of Watchman left atrial appendage closure device.    PAST SURGICAL HX  Presence of Watchman left atrial appendage closure device    S/P BKA (below knee amputation) unilateral, left    Unilateral amputation of lower extremity below knee.      FAMILY HX  Family history unknown: Adopted      SOCIAL HX  lives alone  former smoker  alcohol use last 2-3 days ago (2020 03:19)       PAST MEDICAL & SURGICAL HISTORY:  Chronic CHF  COPD without exacerbation  Atrial fibrillation  Presence of Watchman left atrial appendage closure device  Hypertension  GERD (gastroesophageal reflux disease)  Chronic obstructive pulmonary disease (COPD)  Anemia  Falls  Meningitis  Collapsed lung  Alcohol withdrawal  Emphysema of lung  Cirrhosis  CHF (congestive heart failure)  Poor historian  Alcohol abuse  Chronic atrial fibrillation  Unilateral amputation of lower extremity below knee  Presence of Watchman left atrial appendage closure device  S/P BKA (below knee amputation) unilateral, left      FAMILY HISTORY:  No pertinent family history in first degree relatives  Family history unknown: Adopted      Social Hx:    Allergies    Ceclor (Rash)  Duricef (Rash)    Intolerances            ICU Vital Signs Last 24 Hrs  ICU Vital Signs Last 24 Hrs  T(C): 38.1 (2020 06:23), Max: 38.2 (2020 04:19)  T(F): 100.6 (2020 06:23), Max: 100.8 (2020 04:19)  HR: 126 (2020 10:00) (122 - 149)  BP: 156/109 (2020 10:00) (110/61 - 189/79)  BP(mean): 114 (2020 10:00) (60 - 137)  ABP: --  ABP(mean): --  RR: 35 (2020 10:00) (18 - 40)  SpO2: 92% (2020 10:00) (91% - 97%)      Phosphorus Level, Serum in AM (20 @ 06:53)    Phosphorus Level, Serum: 3.2 mg/dL    Magnesium, Serum in AM (20 @ 06:53)    Magnesium, Serum: 2.8 mg/dL    Basic Metabolic Panel in AM (20 @ 06:53)    Sodium, Serum: 152 mmol/L    Potassium, Serum: 3.3 mmol/L    Chloride, Serum: 111 mmol/L    Carbon Dioxide, Serum: 35 mmol/L    Anion Gap, Serum: 6 mmol/L    Blood Urea Nitrogen, Serum: 48 mg/dL    Creatinine, Serum: 1.06 mg/dL    Glucose, Serum: 152 mg/dL    Calcium, Total Serum: 8.4 mg/dL    Complete Blood Count in AM (20 @ 06:53)    WBC Count: 12.86 K/uL    RBC Count: 5.36 M/uL    Hemoglobin: 17.2 g/dL    Hematocrit: 52.5 %    Mean Cell Volume: 97.9 fl    Mean Cell Hemoglobin: 32.1 pg    Mean Cell Hemoglobin Conc: 32.8 gm/dL    Red Cell Distrib Width: 13.4 %    Platelet Count - Automated: 131 K/uL    ABG - ( 2020 11:13 )  pH, Arterial: 7.34  pH, Blood: x     /  pCO2: 64    /  pO2: 84    / HCO3: 33    / Base Excess: 5.0   /  SaO2: 94        Urinalysis Basic - ( 2020 05:15 )    Color: Yellow / Appearance: Clear / S.015 / pH: x  Gluc: x / Ketone: Negative  / Bili: Negative / Urobili: 4 mg/dL   Blood: x / Protein: 15 mg/dL / Nitrite: Negative   Leuk Esterase: Negative / RBC: 11-25 /HPF / WBC 3-5   Sq Epi: x / Non Sq Epi: Negative / Bacteria: Few        MEDICATIONS  (STANDING):  ALBUTerol    90 MICROgram(s) HFA Inhaler 2 Puff(s) Inhalation every 4 hours  aspirin enteric coated 81 milliGRAM(s) Oral daily  budesonide 160 MICROgram(s)/formoterol 4.5 MICROgram(s) Inhaler 2 Puff(s) Inhalation two times a day  clopidogrel Tablet 75 milliGRAM(s) Oral daily  dexMEDEtomidine Infusion 0.5 MICROgram(s)/kG/Hr (14.7 mL/Hr) IV Continuous <Continuous>  diltiazem    Tablet 60 milliGRAM(s) Enteral Tube every 6 hours  diltiazem Infusion 10 mG/Hr (10 mL/Hr) IV Continuous <Continuous>  enoxaparin Injectable 40 milliGRAM(s) SubCutaneous every 12 hours  folic acid 1 milliGRAM(s) Oral daily  furosemide    Tablet 40 milliGRAM(s) Oral daily  gabapentin 400 milliGRAM(s) Oral three times a day  LORazepam   Injectable 2 milliGRAM(s) IV Push every 8 hours  methylPREDNISolone sodium succinate Injectable 40 milliGRAM(s) IV Push every 6 hours  multivitamin 1 Tablet(s) Oral daily  pantoprazole  Injectable 40 milliGRAM(s) IV Push daily  propofol Infusion 5 MICROgram(s)/kG/Min (3.54 mL/Hr) IV Continuous <Continuous>  thiamine 100 milliGRAM(s) Oral daily  tiotropium 18 MICROgram(s) Capsule 1 Capsule(s) Inhalation daily    MEDICATIONS  (PRN):  traZODone 100 milliGRAM(s) Oral at bedtime PRN insomnia      DVT Prophylaxis:    Advanced Directives:  Discussed with:    Visit Information: 30     ** Time is exclusive of billed procedures and/or teaching and/or routine family updates.            Physical Exam:   · Respiratory	detailed exam	  · Respiratory Details	breath sounds equal; no rales; no rhonchi	  · Cardiovascular	detailed exam	  · Cardiovascular Details	regular rate and rhythm	  · Cardiovascular Details	positive S1; positive S2	  · Gastrointestinal	detailed exam	  · GI Normal	soft	    Assessment and Plan:   · Assessment		  A/P: 65 male with acute respiratory failure from altered awareness and sedation  COPD  AFIB s/p watchman  CHF  HTN  ETOH abuse    Plan:   CICU    PULM: Steroids stopped, tolerating extubation    Cardio: BP stable, continue ASA, Plavix, Cardizem--Added Cardizem drip for R-AFIB    Renal: Cr. Stable    GI: awaiting speech and swallow, PPI    PSY: Ativan ATC, Thiamine for chronic ETOH abuse and thiamine deficiency folate    Lovenox DVT Prophylaxis

## 2020-04-27 NOTE — SWALLOW BEDSIDE ASSESSMENT ADULT - PHARYNGEAL PHASE
Swallow trigger was timely to mildly latent. Laryngeal lift on palpation during swallowing trials was moderately reduced but fel to be grossly functional with some of the above modified food consistencies. Post prandial coughing was demonstrated with thin liquids and less frequently nectar thick liquids, despite cues to employ compensatory swallowing maneuvers. NO behavioral aspiration signs exhibited with honey thick liquids, pureed foods and mechanical soft solids. Swallow trigger was timely to mildly latent. Laryngeal lift on palpation during swallowing trials was moderately reduced but felt to be grossly functional with some of the above modified food consistencies. Post prandial coughing was demonstrated with thin liquids and less frequently nectar thick liquids, despite cues to employ compensatory swallowing maneuvers. NO behavioral aspiration signs exhibited with honey thick liquids, pureed foods and mechanical soft solids.

## 2020-04-27 NOTE — SWALLOW BEDSIDE ASSESSMENT ADULT - SWALLOW EVAL: DIAGNOSIS
1) Pt exhibits periodically reduced alertness for/orientation to feeding atop Oropharyngeal Dysphagia which subjectively appeared to be a grossly functional condition with a restricted inventory of modified food consistencies when he is alert/cognizant enough to be fed which is not always the case. Overt aspiration signs noted with liquids less viscous than honey consistency. 2) Pt is arousable but lethargic, communicatively passive & internally distractible. Eye opening is fleeting & aperture of eye opening was reduced. Unable to direct to communication tasks but he did occasionally follow 1 step commands. Further, he often moaned without clear intent while interspersing periodic cackling episodes.  On occasion, he also occasionally responsively produced brief verbalizations when asked a personally relevant question. At these times, his articulatory effort was reduced when  attempting to talk & his verbalizations were brief/variably contextually inappropriate c/w encephalopathy. 1) Pt exhibits periodically reduced alertness for/orientation to feeding atop Oropharyngeal Dysphagia which subjectively appeared to be a grossly functional condition with a restricted inventory of modified food consistencies when he is alert/cognizant enough to be fed which is not always the case. Overt aspiration signs noted with liquids less viscous than honey consistency. 2) Pt is arousable but lethargic, communicatively passive & internally distractible. Eye opening is fleeting & aperture of eye opening was reduced. Unable to direct to communication tasks but he did occasionally follow 1 step commands. Further, he often moaned without clear intent while interspersing periodic cackling episodes.  On occasion, he also occasionally responsively produced brief verbalizations when asked a personally relevant question. At these times, his articulatory effort was reduced when attempting to talk & his verbalizations were brief/variably contextually inappropriate c/w encephalopathy.

## 2020-04-27 NOTE — SWALLOW BEDSIDE ASSESSMENT ADULT - ADDITIONAL RECOMMENDATIONS
1)  NUTRITION FOLLOW UP. PT WITH HTN, CHF, GERD AND DYSPHAGIA. HE IS AT NUTRITION RISK.    2)  TOTAL ASSISTANCE IS NEEDED TO FEED. FEED ONLY WHEN ALERT WHICH MAY NOT ALWAYS BE THE CASE. 1)  NUTRITION FOLLOW UP. PT WITH HTN, CHF, GERD AND DYSPHAGIA. HE IS AT NUTRITION RISK.    2)  TOTAL ASSISTANCE IS NEEDED TO FEED. FEED ONLY WHEN ALERT WHICH MAY NOT ALWAYS BE THE CASE. PT'S FEEDABILITY AND SEVERITY OF DYSPHAGIA ARE APT TO FLUCTUATE WITH CHANGES IN ALERTNESS/MENTATION/MEDICAL STATUS.

## 2020-04-27 NOTE — SWALLOW BEDSIDE ASSESSMENT ADULT - SWALLOW EVAL: RECOMMENDED FEEDING/EATING TECHNIQUES
check mouth frequently for oral residue/pocketing/crush medication (when feasible)/small sips/bites/maintain upright posture during/after eating for 30 mins

## 2020-04-27 NOTE — PROGRESS NOTE ADULT - SUBJECTIVE AND OBJECTIVE BOX
Interval History:  20: Patient is unable to express complaints. When asked questions, he just says, "food"    MEDICATIONS  (STANDING):  ALBUTerol    90 MICROgram(s) HFA Inhaler 2 Puff(s) Inhalation every 4 hours  aspirin enteric coated 81 milliGRAM(s) Oral daily  budesonide 160 MICROgram(s)/formoterol 4.5 MICROgram(s) Inhaler 2 Puff(s) Inhalation two times a day  clopidogrel Tablet 75 milliGRAM(s) Oral daily  digoxin  Injectable 0.25 milliGRAM(s) IV Push every 4 hours  diltiazem Infusion 10 mG/Hr (10 mL/Hr) IV Continuous <Continuous>  enoxaparin Injectable 40 milliGRAM(s) SubCutaneous every 12 hours  folic acid Injectable 1 milliGRAM(s) IV Push daily  furosemide   Injectable 40 milliGRAM(s) IV Push daily  gabapentin 400 milliGRAM(s) Oral three times a day  LORazepam   Injectable 2 milliGRAM(s) IV Push every 8 hours  methylPREDNISolone sodium succinate Injectable 40 milliGRAM(s) IV Push every 6 hours  multivitamin 1 Tablet(s) Oral daily  pantoprazole  Injectable 40 milliGRAM(s) IV Push daily  thiamine 100 milliGRAM(s) Oral daily  tiotropium 18 MICROgram(s) Capsule 1 Capsule(s) Inhalation daily    MEDICATIONS  (PRN):  traZODone 100 milliGRAM(s) Oral at bedtime PRN insomnia      Allergies    Ceclor (Rash)  Duricef (Rash)    Intolerances        PHYSICAL EXAM:  Vital Signs Last 24 Hrs  T(F): 100.6 (20 @ 06:23)  HR: 133 (20 @ 08:00)  BP: 160/93 (20 @ 08:00)  RR: 37 (20 @ 08:00)    GENERAL: NAD, well-groomed, well-developed  HEAD:  Atraumatic, Normocephalic  Neuro:  Awake, alert, unable to answer questions appropriately. Perseverates, "food" to any questions  CN: PERRL, EOMI, no nystagmus, no facial weakness  motor: Left lower extremity amputation, otherwise moves all extremities but not cooperative with confrontation testing  coord: unable to cooperate    LABS:                        17.2   12.86 )-----------( 131      ( 2020 06:53 )             52.5         152<H>  |  111<H>  |  48<H>  ----------------------------<  152<H>  3.3<L>   |  35<H>  |  1.06    Ca    8.4<L>      2020 06:53  Phos  3.2       Mg     2.8             Urinalysis Basic - ( 2020 05:15 )    Color: Yellow / Appearance: Clear / S.015 / pH: x  Gluc: x / Ketone: Negative  / Bili: Negative / Urobili: 4 mg/dL   Blood: x / Protein: 15 mg/dL / Nitrite: Negative   Leuk Esterase: Negative / RBC: 11-25 /HPF / WBC 3-5   Sq Epi: x / Non Sq Epi: Negative / Bacteria: Few        RADIOLOGY & ADDITIONAL STUDIES:      CT Head  < from: CT Brain Stroke Protocol (20 @ 20:29) >  Impression:    No acute hemorrhage, mass effect or extra-axial collections.      < from: CT Angio Neck w/ IV Cont (20 @ 21:58) >  IMPRESSION:  Patent cervical and intracranial major arterial vasculature without evidence of abrupt vessel cut off, hemodynamically significant stenosis, aneurysm, or dissection.    EEG :   < from: EEG Awake and Asleep (20 @ 10:30) >  Impression:  This is an mild abnormal EEG due to the presence of  1. Mild generalized background slowing may be on the basis of underlying cerebral dysfunction may be on the basis of underlying metabolic, toxic or structural pathology or due to sedation.  2. Excess beta activity noted related to medication effect. Clinical correlation recommended.  3. No epileptiform activity noted. Clinical correlation recommended.  Repeat study without sedation if clinically indicated.

## 2020-04-27 NOTE — PROGRESS NOTE ADULT - SUBJECTIVE AND OBJECTIVE BOX
REASON FOR VISIT: AF, Rx management    HPI:  65 year old man with a history of chronic AF, Watchman device (implanted 2019), HTN, CVAs, alcoholism, tobacco abuse, COPD, HFpEF (60% 2018), GERD, cirrhosis, left BKA following traumatic injury admitted on 2020 with  acute on chronic hypercapnic & hypoxemic respiratory failure due to suspected exacerbation of COPD and/or bronchitis.    20 Patient complain of wheezing ,no sob at rest , on 4 L NC O2  despite of being on IV lasix ,  heart rate is 80 to 100s    2020:  "I'm still wheezing."  No new complaints.  2020:  Overnight events noted and case discussed with Dr Crews; sedated on vent in CICU.  20  Patient is remain intubated , his heart rate is controlled , rectus sheath hematoma without significant drop in hemoglobin level ,   20: no real changes from yesterday and remains intubated and sedated.   20: Mental status not improved and now extubated.  In rapid atrial fib AM.  Cannot take po due to mental status.    20: Mental status still not improved enough to take medication oral and despite maximum cardizem     MEDICATIONS  (STANDING):  ALBUTerol    90 MICROgram(s) HFA Inhaler 2 Puff(s) Inhalation every 4 hours  budesonide 160 MICROgram(s)/formoterol 4.5 MICROgram(s) Inhaler 2 Puff(s) Inhalation two times a day  diltiazem Infusion 10 mG/Hr (10 mL/Hr) IV Continuous <Continuous>  enoxaparin Injectable 40 milliGRAM(s) SubCutaneous every 12 hours  folic acid Injectable 1 milliGRAM(s) IV Push daily  furosemide   Injectable 40 milliGRAM(s) IV Push daily  gabapentin 400 milliGRAM(s) Oral three times a day  LORazepam   Injectable 2 milliGRAM(s) IV Push every 8 hours  methylPREDNISolone sodium succinate Injectable 40 milliGRAM(s) IV Push every 6 hours  multivitamin 1 Tablet(s) Oral daily  pantoprazole  Injectable 40 milliGRAM(s) IV Push daily  thiamine 100 milliGRAM(s) Oral daily  tiotropium 18 MICROgram(s) Capsule 1 Capsule(s) Inhalation daily    MEDICATIONS  (PRN):  traZODone 100 milliGRAM(s) Oral at bedtime PRN insomnia      Vital Signs Last 24 Hrs  T(C): 38.1 (2020 06:23), Max: 38.2 (2020 04:19)  T(F): 100.6 (2020 06:23), Max: 100.8 (2020 04:19)  HR: 133 (2020 08:00) (82 - 149)  BP: 160/93 (2020 08:00) (110/61 - 189/79)  BP(mean): 109 (2020 08:00) (60 - 137)  RR: 37 (2020 08:00) (18 - 40)  SpO2: 92% (2020 08:00) (91% - 97%)    I&O's Detail    2020 07:01  -  2020 07:00  --------------------------------------------------------  IN:  Total IN: 0 mL    OUT:    Voided: 1000 mL  Total OUT: 1000 mL    Total NET: -1000 mL          Daily     Daily Weight in k.5 (2020 04:19)  Daily Weight in k.5 (2020 06:54)    PHYSICAL EXAM:  Constitutional: Laying in bed, no responsive to questions  Respiratory: Scattered rhonchi, rhoncurus BS  Cardiovascular: S1, S2, IR, IR   Gastrointestinal: Abdomen is soft. +BS (scant)   Extremities:  + leg edema (bilateral) with stasis erythema; s/p LLE amputation    LABS:                          17.2   12.86 )-----------( 131      ( 2020 06:53 )             52.5     0427    152<H>  |  111<H>  |  48<H>  ----------------------------<  152<H>  3.3<L>   |  35<H>  |  1.06    Ca    8.4<L>      2020 06:53  Phos  3.2       Mg     2.8         Transthoracic Echocardiogram (06.10.19 @ 20:49):   Estimated left ventricular ejection fraction is 60-65 %. The left ventricle is normal in wall thickness, wall motion and contractility as seen in limited views.   The left ventricle cavity is moderately dilated.   The left atrium is severely dilated.   The right atrium appears severely dilated.   Normal appearing right ventricle structure and function.   Moderate (2+) mitral regurgitation.   Mild to Moderate Tricuspid regurgitation.     US Duplex Venous Lower Ext Complete, Bilateral (20 @ 16:17):  No evidence of deep venous thrombosis in either lower extremity.    Tele: AF    CT Brain Stroke Protocol (20 @ 20:29):  No acute hemorrhage, mass effect or extra-axial collections.    CT Abdomen and Pelvis w/wo IV Cont (20 @ 09:09) >  1.  3 mm nonobstructing stone in the left ureterovesicular junction.  2.  7 mm nonobstructing stone in the lower pole right kidney.  3.  Subacute hemorrhage in the right rectus abdominis along the anterior sheath, measures 1.6 cm in thickness and extends from the umbilicus to the inferior myotendinous junction.  4.  Cirrhosis. Mild splenomegaly is new from the prior and suggestive of portal venous hypertension.

## 2020-04-27 NOTE — PROGRESS NOTE ADULT - ASSESSMENT
PROBLEMS;    Ac on chronic hypercapnic & hypoxamic respiratory failure-s/p extubation-worsening ventilation  afib with RVR  OHS/VIJAY  AC flare of COPD/chronic vs acute on chronic bronchitis  Mild interstitial lung disease-NSIP h/o smoking  COVID negativex2  Pulmonary hypertension  Nonobstructing stone in the left ureterovesicular junction/ nonobstructing stone in the lower pole right kidney.  Subacute hemorrhage in the right rectus abdominis along the anterior sheath, measures 1.6 cm in thickness and extends from the umbilicus to the inferior myotendinous junction  Cirrhosis  Mild splenomegaly-portal venous hypertension.  Chronic afib w Watchman device  CHF  BPH  GERD  ETOH abuse  seizures disorder    PLAN;    pulmonary better  s/p extubation- ABG monitoring more awake  taper iv solumedrols 40mg q12hr  iv cardizem for rapid afib  off azithromycin  aerosols  supportive care  covid repeat testing-neg  d/w staff

## 2020-04-27 NOTE — SWALLOW BEDSIDE ASSESSMENT ADULT - COMMENTS
The pt's selected hospital course is notable for encephalopathy with generalized slowing on EEG, ETOH withdrawl, COPD exacerbation with respiratory failure s/p need for transient mechanical ventilation, and rapid A-Fib. He has an underlying history of A-Fib s/p Watchman Device, CHF, COPD, emphysema, HTN, ETOH abuse, cirrhosis, Dysphagia, GERD, past meningitis, previous collapsed lung, prior need for tracheostomy/PEG placements when acutely deconditioned, both of which were removed. Additionally, pt is s/p a left BKA. The patient's selected hospital course is notable for encephalopathy with generalized slowing on EEG, ETOH withdrawl, COPD exacerbation with respiratory failure s/p need for transient mechanical ventilation, and rapid A-Fib. He has an underlying history of A-Fib s/p Watchman Device, CHF, COPD, emphysema, HTN, ETOH abuse, cirrhosis, Dysphagia, GERD, past meningitis, previous collapsed lung, and prior need for tracheostomy/PEG placements when acutely deconditioned, both of which were removed. Additionally, pt is s/p a left BKA.

## 2020-04-27 NOTE — SWALLOW BEDSIDE ASSESSMENT ADULT - SWALLOW EVAL: RECOMMENDED DIET
SUGGEST A DYSPHAGIA 1 DIET WITH HONEY THICK LIQUIDS AS TOLERATED. THIS IS THE LEAST RESTRICTIVE TOLERABLE DIET CONSISTENCIES FROM AN OROPHARYNGEAL SWALLOWING STANCE WHEN IN AN ALERT STATE. PT MUST BE ALERT WHEN BEING FED. SUGGEST A DYSPHAGIA 1 DIET WITH HONEY THICK LIQUIDS AS TOLERATED. THIS IS THE LEAST RESTRICTIVE TOLERABLE DIET CONSISTENCIES FROM AN OROPHARYNGEAL SWALLOWING STANCE WHEN IN AN ALERT STATE. PT MUST BE ALERT WHEN BEING FED WHICH IS NOT ALWAYS THE CASE.

## 2020-04-27 NOTE — SWALLOW BEDSIDE ASSESSMENT ADULT - NS SPL SWALLOW CLINIC TRIAL FT
Coarse solids not offered given dysphagic profile. Odynophagia was denied. Effects of acute medical deconditioning are contributory to his feeding compromise/Dysphagia(i.e ETOH withdrawl, rapid A-Fib, COPD exacerbation with respiratory failure warranting transient mechanical ventilation).

## 2020-04-27 NOTE — SWALLOW BEDSIDE ASSESSMENT ADULT - SLP GENERAL OBSERVATIONS
On encounter, wrist restraints were in place. A scarred indentation was noted below site of thyroid notch which is his suspected tracheal decannulation site. The pt is arousable but lethargic, communicatively passive and internally distractible. Eye opening is fleeting and aperture of eye opening was reduced. Unable to direct to communication tasks but he did occasionally follow 1 step commands. Further, he often moaned without clear intent while interspersing periodic cackling episodes.  On occasion, he also occasionally responsively produced brief verbalizations when asked a personally relevant question. At these times, his articulatory effort was reduced when attempting to talk and his verbalizations were brief/variably contextually inappropriate c/w encephalopathy.

## 2020-04-27 NOTE — PROGRESS NOTE ADULT - ASSESSMENT
· Assessment		  65 year old male w chronic AFib w Watchman device, CHF, COPD, HTN, alcohol abuse, obesity came to ED w EMS c/o SOB since the morning of 04-18.  Per EMS, pt was in rapid afib in 180s upon EMS arrival with O2 sat in 80 acute respiratory failure with hypercapnia due to COPD exacerbation .    Neurology called after episode of altered mental status and slurred speech on 4/22.    -CT head negative for any acute stroke.  -Likely hypoxic/metabolic encephalopathy  -EEG showing evidence of encephalopathy but no epileptiform activity.   -Repeat CT head or MRI when stable  -Sedation stopped on 4/26. Still confused. Will continue to observe  -Remains on CIWA protocol.    *AF with RVR   Per Cardiology  -Not on Ac with Watchman procedure.  -continue present meds      * COPD/Respiratory failure  Per Pulmonologist    Will follow up as needed.

## 2020-04-28 LAB
-  AMIKACIN: SIGNIFICANT CHANGE UP
-  AMOXICILLIN/CLAVULANIC ACID: SIGNIFICANT CHANGE UP
-  AMPICILLIN/SULBACTAM: SIGNIFICANT CHANGE UP
-  AMPICILLIN: SIGNIFICANT CHANGE UP
-  AZTREONAM: SIGNIFICANT CHANGE UP
-  CEFAZOLIN: SIGNIFICANT CHANGE UP
-  CEFEPIME: SIGNIFICANT CHANGE UP
-  CEFTAZIDIME: SIGNIFICANT CHANGE UP
-  CEFTRIAXONE: SIGNIFICANT CHANGE UP
-  CIPROFLOXACIN: SIGNIFICANT CHANGE UP
-  CIPROFLOXACIN: SIGNIFICANT CHANGE UP
-  CLINDAMYCIN: SIGNIFICANT CHANGE UP
-  DAPTOMYCIN: SIGNIFICANT CHANGE UP
-  ERYTHROMYCIN: SIGNIFICANT CHANGE UP
-  GENTAMICIN: SIGNIFICANT CHANGE UP
-  GENTAMICIN: SIGNIFICANT CHANGE UP
-  IMIPENEM: SIGNIFICANT CHANGE UP
-  LEVOFLOXACIN: SIGNIFICANT CHANGE UP
-  LEVOFLOXACIN: SIGNIFICANT CHANGE UP
-  LINEZOLID: SIGNIFICANT CHANGE UP
-  MEROPENEM: SIGNIFICANT CHANGE UP
-  MEROPENEM: SIGNIFICANT CHANGE UP
-  MOXIFLOXACIN(AEROBIC): SIGNIFICANT CHANGE UP
-  OXACILLIN: SIGNIFICANT CHANGE UP
-  PENICILLIN: SIGNIFICANT CHANGE UP
-  PIPERACILLIN/TAZOBACTAM: SIGNIFICANT CHANGE UP
-  RIFAMPIN: SIGNIFICANT CHANGE UP
-  TETRACYCLINE: SIGNIFICANT CHANGE UP
-  TOBRAMYCIN: SIGNIFICANT CHANGE UP
-  TRIMETHOPRIM/SULFAMETHOXAZOLE: SIGNIFICANT CHANGE UP
-  VANCOMYCIN: SIGNIFICANT CHANGE UP
AMMONIA BLD-MCNC: 19 UMOL/L — SIGNIFICANT CHANGE UP (ref 11–32)
ANION GAP SERPL CALC-SCNC: 4 MMOL/L — LOW (ref 5–17)
BASOPHILS # BLD AUTO: 0 K/UL — SIGNIFICANT CHANGE UP (ref 0–0.2)
BASOPHILS NFR BLD AUTO: 0 % — SIGNIFICANT CHANGE UP (ref 0–2)
BUN SERPL-MCNC: 50 MG/DL — HIGH (ref 7–23)
CALCIUM SERPL-MCNC: 8.6 MG/DL — SIGNIFICANT CHANGE UP (ref 8.5–10.1)
CHLORIDE SERPL-SCNC: 114 MMOL/L — HIGH (ref 96–108)
CO2 SERPL-SCNC: 36 MMOL/L — HIGH (ref 22–31)
CREAT SERPL-MCNC: 1.07 MG/DL — SIGNIFICANT CHANGE UP (ref 0.5–1.3)
CULTURE RESULTS: SIGNIFICANT CHANGE UP
DIGOXIN SERPL-MCNC: 0.87 NG/ML — SIGNIFICANT CHANGE UP (ref 0.8–2)
EOSINOPHIL # BLD AUTO: 0 K/UL — SIGNIFICANT CHANGE UP (ref 0–0.5)
EOSINOPHIL NFR BLD AUTO: 0 % — SIGNIFICANT CHANGE UP (ref 0–6)
GLUCOSE SERPL-MCNC: 172 MG/DL — HIGH (ref 70–99)
HCT VFR BLD CALC: 54.5 % — HIGH (ref 39–50)
HGB BLD-MCNC: 17.7 G/DL — HIGH (ref 13–17)
LYMPHOCYTES # BLD AUTO: 0.42 K/UL — LOW (ref 1–3.3)
LYMPHOCYTES # BLD AUTO: 3 % — LOW (ref 13–44)
MAGNESIUM SERPL-MCNC: 2.8 MG/DL — HIGH (ref 1.6–2.6)
MANUAL SMEAR VERIFICATION: SIGNIFICANT CHANGE UP
MCHC RBC-ENTMCNC: 32.5 GM/DL — SIGNIFICANT CHANGE UP (ref 32–36)
MCHC RBC-ENTMCNC: 32.5 PG — SIGNIFICANT CHANGE UP (ref 27–34)
MCV RBC AUTO: 100.2 FL — HIGH (ref 80–100)
METHOD TYPE: SIGNIFICANT CHANGE UP
METHOD TYPE: SIGNIFICANT CHANGE UP
MONOCYTES # BLD AUTO: 0 K/UL — SIGNIFICANT CHANGE UP (ref 0–0.9)
MONOCYTES NFR BLD AUTO: 0 % — LOW (ref 2–14)
NEUTROPHILS # BLD AUTO: 13.63 K/UL — HIGH (ref 1.8–7.4)
NEUTROPHILS NFR BLD AUTO: 97 % — HIGH (ref 43–77)
NRBC # BLD: 0 /100 — SIGNIFICANT CHANGE UP (ref 0–0)
NRBC # BLD: SIGNIFICANT CHANGE UP /100 WBCS (ref 0–0)
PHOSPHATE SERPL-MCNC: 3.2 MG/DL — SIGNIFICANT CHANGE UP (ref 2.5–4.5)
PLAT MORPH BLD: NORMAL — SIGNIFICANT CHANGE UP
PLATELET # BLD AUTO: 133 K/UL — LOW (ref 150–400)
POTASSIUM SERPL-MCNC: 3.8 MMOL/L — SIGNIFICANT CHANGE UP (ref 3.5–5.3)
POTASSIUM SERPL-SCNC: 3.8 MMOL/L — SIGNIFICANT CHANGE UP (ref 3.5–5.3)
RBC # BLD: 5.44 M/UL — SIGNIFICANT CHANGE UP (ref 4.2–5.8)
RBC # FLD: 13.4 % — SIGNIFICANT CHANGE UP (ref 10.3–14.5)
RBC BLD AUTO: NORMAL — SIGNIFICANT CHANGE UP
SODIUM SERPL-SCNC: 154 MMOL/L — HIGH (ref 135–145)
WBC # BLD: 14.05 K/UL — HIGH (ref 3.8–10.5)
WBC # FLD AUTO: 14.05 K/UL — HIGH (ref 3.8–10.5)

## 2020-04-28 PROCEDURE — 99233 SBSQ HOSP IP/OBS HIGH 50: CPT

## 2020-04-28 PROCEDURE — 74018 RADEX ABDOMEN 1 VIEW: CPT | Mod: 26

## 2020-04-28 PROCEDURE — 43752 NASAL/OROGASTRIC W/TUBE PLMT: CPT

## 2020-04-28 PROCEDURE — 99232 SBSQ HOSP IP/OBS MODERATE 35: CPT

## 2020-04-28 RX ORDER — DILTIAZEM HCL 120 MG
30 CAPSULE, EXT RELEASE 24 HR ORAL EVERY 6 HOURS
Refills: 0 | Status: DISCONTINUED | OUTPATIENT
Start: 2020-04-28 | End: 2020-04-28

## 2020-04-28 RX ORDER — FUROSEMIDE 40 MG
80 TABLET ORAL ONCE
Refills: 0 | Status: COMPLETED | OUTPATIENT
Start: 2020-04-28 | End: 2020-04-28

## 2020-04-28 RX ORDER — VANCOMYCIN HCL 1 G
1000 VIAL (EA) INTRAVENOUS EVERY 12 HOURS
Refills: 0 | Status: DISCONTINUED | OUTPATIENT
Start: 2020-04-28 | End: 2020-04-30

## 2020-04-28 RX ORDER — POLYETHYLENE GLYCOL 3350 17 G/17G
17 POWDER, FOR SOLUTION ORAL DAILY
Refills: 0 | Status: DISCONTINUED | OUTPATIENT
Start: 2020-04-28 | End: 2020-05-18

## 2020-04-28 RX ORDER — DILTIAZEM HCL 120 MG
60 CAPSULE, EXT RELEASE 24 HR ORAL EVERY 6 HOURS
Refills: 0 | Status: DISCONTINUED | OUTPATIENT
Start: 2020-04-28 | End: 2020-05-01

## 2020-04-28 RX ORDER — CIPROFLOXACIN LACTATE 400MG/40ML
400 VIAL (ML) INTRAVENOUS EVERY 12 HOURS
Refills: 0 | Status: DISCONTINUED | OUTPATIENT
Start: 2020-04-28 | End: 2020-05-06

## 2020-04-28 RX ORDER — SODIUM CHLORIDE 9 MG/ML
1000 INJECTION, SOLUTION INTRAVENOUS
Refills: 0 | Status: DISCONTINUED | OUTPATIENT
Start: 2020-04-28 | End: 2020-04-28

## 2020-04-28 RX ORDER — MULTIVIT-MIN/FERROUS GLUCONATE 9 MG/15 ML
15 LIQUID (ML) ORAL DAILY
Refills: 0 | Status: DISCONTINUED | OUTPATIENT
Start: 2020-04-28 | End: 2020-05-14

## 2020-04-28 RX ORDER — HYDRALAZINE HCL 50 MG
25 TABLET ORAL EVERY 6 HOURS
Refills: 0 | Status: DISCONTINUED | OUTPATIENT
Start: 2020-04-28 | End: 2020-04-29

## 2020-04-28 RX ORDER — ENOXAPARIN SODIUM 100 MG/ML
40 INJECTION SUBCUTANEOUS DAILY
Refills: 0 | Status: DISCONTINUED | OUTPATIENT
Start: 2020-04-28 | End: 2020-04-29

## 2020-04-28 RX ORDER — FOLIC ACID 0.8 MG
1 TABLET ORAL DAILY
Refills: 0 | Status: DISCONTINUED | OUTPATIENT
Start: 2020-04-28 | End: 2020-05-14

## 2020-04-28 RX ADMIN — Medication 80 MILLIGRAM(S): at 22:28

## 2020-04-28 RX ADMIN — GABAPENTIN 400 MILLIGRAM(S): 400 CAPSULE ORAL at 14:23

## 2020-04-28 RX ADMIN — SODIUM CHLORIDE 75 MILLILITER(S): 9 INJECTION, SOLUTION INTRAVENOUS at 14:26

## 2020-04-28 RX ADMIN — CLOPIDOGREL BISULFATE 75 MILLIGRAM(S): 75 TABLET, FILM COATED ORAL at 14:23

## 2020-04-28 RX ADMIN — Medication 10 MG/HR: at 05:13

## 2020-04-28 RX ADMIN — Medication 40 MILLIGRAM(S): at 05:12

## 2020-04-28 RX ADMIN — PANTOPRAZOLE SODIUM 40 MILLIGRAM(S): 20 TABLET, DELAYED RELEASE ORAL at 14:24

## 2020-04-28 RX ADMIN — Medication 25 MILLIGRAM(S): at 14:31

## 2020-04-28 RX ADMIN — Medication 1 TABLET(S): at 14:23

## 2020-04-28 RX ADMIN — Medication 250 MILLIGRAM(S): at 19:56

## 2020-04-28 RX ADMIN — Medication 81 MILLIGRAM(S): at 14:23

## 2020-04-28 RX ADMIN — Medication 2 MILLIGRAM(S): at 05:12

## 2020-04-28 RX ADMIN — Medication 25 MILLIGRAM(S): at 19:55

## 2020-04-28 RX ADMIN — Medication 0.12 MILLIGRAM(S): at 14:23

## 2020-04-28 RX ADMIN — Medication 100 MILLIGRAM(S): at 14:22

## 2020-04-28 RX ADMIN — Medication 200 MILLIGRAM(S): at 19:56

## 2020-04-28 RX ADMIN — ENOXAPARIN SODIUM 40 MILLIGRAM(S): 100 INJECTION SUBCUTANEOUS at 14:26

## 2020-04-28 RX ADMIN — Medication 40 MILLIGRAM(S): at 19:56

## 2020-04-28 RX ADMIN — Medication 2 MILLIGRAM(S): at 19:56

## 2020-04-28 RX ADMIN — Medication 2 MILLIGRAM(S): at 14:24

## 2020-04-28 RX ADMIN — Medication 1 MILLIGRAM(S): at 16:00

## 2020-04-28 RX ADMIN — Medication 30 MILLIGRAM(S): at 19:56

## 2020-04-28 RX ADMIN — Medication 2 MILLIGRAM(S): at 00:48

## 2020-04-28 NOTE — PROGRESS NOTE ADULT - SUBJECTIVE AND OBJECTIVE BOX
REASON FOR VISIT: AF, Rx management    HPI:  65 year old man with a history of chronic AF, Watchman device (implanted 4/2019), HTN, CVAs, alcoholism, tobacco abuse, COPD, HFpEF (60% 12/2018), GERD, cirrhosis, left BKA following traumatic injury admitted on 4/18/2020 with  acute on chronic hypercapnic & hypoxemic respiratory failure due to suspected exacerbation of COPD and/or bronchitis.    4/21/20 Patient complain of wheezing ,no sob at rest , on 4 L NC O2  despite of being on IV lasix ,  heart rate is 80 to 100s    4/22/2020:  "I'm still wheezing."  No new complaints.  4/23/2020:  Overnight events noted and case discussed with Dr Crews; sedated on vent in CICU.  4/24/20  Patient is remain intubated , his heart rate is controlled , rectus sheath hematoma without significant drop in hemoglobin level ,   4/25/20: no real changes from yesterday and remains intubated and sedated.   4/26/20: Mental status not improved and now extubated.  In rapid atrial fib AM.  Cannot take po due to mental status.    4/27/20: Mental status still not improved enough to take medication oral and despite maximum cardizem   4/28/20 Patient is lethargic arousble confused , hypertensive , patient had low grade fever did  recieve IV tylenol , patient heart rate is better after starting him on digoxin , and on maximum cardizem drip patient hypernatremic , poor oral intake      MEDICATIONS Home Medications:  albuterol 2.5 mg/3 mL (0.083%) inhalation solution: 3 milliliter(s) inhaled every 6 hours, As Needed -for shortness of breath and/or wheezing (19 Apr 2020 03:12)  gabapentin 400 mg oral capsule: 1 cap(s) orally 3 times a day (19 Apr 2020 03:12)  pantoprazole 40 mg oral granule, enteric coated: 40 milligram(s) orally once a day (19 Apr 2020 03:12)  Spiriva 18 mcg inhalation capsule: 1 cap(s) inhaled once a day (19 Apr 2020 03:12)  tamsulosin 0.4 mg oral capsule: 1 cap(s) orally once a day (at bedtime) (19 Apr 2020 03:12)  traZODone 50 mg oral tablet: 2 tab(s) orally once a day (at bedtime), As Needed (19 Apr 2020 03:12)       (STANDING):  ALBUTerol    90 MICROgram(s) HFA Inhaler 2 Puff(s) Inhalation every 4 hours  aspirin enteric coated 81 milliGRAM(s) Oral daily  budesonide 160 MICROgram(s)/formoterol 4.5 MICROgram(s) Inhaler 2 Puff(s) Inhalation two times a day  clopidogrel Tablet 75 milliGRAM(s) Oral daily  digoxin  Injectable 0.125 milliGRAM(s) IV Push daily  diltiazem Infusion 10 mG/Hr (10 mL/Hr) IV Continuous <Continuous>  enoxaparin Injectable 40 milliGRAM(s) SubCutaneous every 12 hours  folic acid Injectable 1 milliGRAM(s) IV Push daily  furosemide   Injectable 40 milliGRAM(s) IV Push daily  gabapentin 400 milliGRAM(s) Oral three times a day  LORazepam   Injectable 2 milliGRAM(s) IV Push every 6 hours  methylPREDNISolone sodium succinate Injectable 40 milliGRAM(s) IV Push every 12 hours  multivitamin 1 Tablet(s) Oral daily  pantoprazole  Injectable 40 milliGRAM(s) IV Push daily  thiamine 100 milliGRAM(s) Oral daily  tiotropium 18 MICROgram(s) Capsule 1 Capsule(s) Inhalation daily    MEDICATIONS  (PRN):  traZODone 100 milliGRAM(s) Oral at bedtime PRN insomnia      Vital Signs Last 24 Hrs  T(C): 37.4 (28 Apr 2020 04:00), Max: 38.4 (27 Apr 2020 14:00)  T(F): 99.3 (28 Apr 2020 04:00), Max: 101.1 (27 Apr 2020 14:00)  HR: 113 (28 Apr 2020 10:00) (99 - 132)  BP: 169/88 (28 Apr 2020 10:00) (129/80 - 176/105)  BP(mean): 107 (28 Apr 2020 10:00) (80 - 126)  RR: 32 (28 Apr 2020 10:00) (25 - 42)  SpO2: 96% (28 Apr 2020 10:00) (91% - 97%)    I&O's Summary    27 Apr 2020 07:01  -  28 Apr 2020 07:00  --------------------------------------------------------  IN: 420 mL / OUT: 1300 mL / NET: -880 mL        )    PHYSICAL EXAM:  Constitutional: Laying in bed,  opens his eyes  not in distress    Respiratory: Scattered rhonchi, rhoncurus BS  Cardiovascular: S1, S2, IR, IR  tachycardic   Gastrointestinal: Abdomen is soft. +BS (scant)   Extremities:  + leg edema (bilateral) with stasis erythema; s/p LLE amputation    LABS:                                       17.7   14.05 )-----------( 133      ( 28 Apr 2020 06:48 )             54.5     04-28    154<H>  |  114<H>  |  50<H>  ----------------------------<  172<H>  3.8   |  36<H>  |  1.07    Ca    8.6      28 Apr 2020 06:48  Phos  3.2     04-28  Mg     2.8     04-28                  Culture Results:   No growth to date. (04-26-20 @ 01:01)  Culture Results:   No growth to date. (04-26-20 @ 00:56)  Culture Results:   Few Pseudomonas aeruginosa (Carbapenem Resistant)  Moderate Methicillin resistant Staphylococcus aureus  Normal Respiratory Faith present (04-25-20 @ 01:00)    Transthoracic Echocardiogram (06.10.19 @ 20:49):   Estimated left ventricular ejection fraction is 60-65 %. The left ventricle is normal in wall thickness, wall motion and contractility as seen in limited views.   The left ventricle cavity is moderately dilated.   The left atrium is severely dilated.   The right atrium appears severely dilated.   Normal appearing right ventricle structure and function.   Moderate (2+) mitral regurgitation.   Mild to Moderate Tricuspid regurgitation.     US Duplex Venous Lower Ext Complete, Bilateral (04.19.20 @ 16:17):  No evidence of deep venous thrombosis in either lower extremity.    Tele: AF with mild RVR     CT Brain Stroke Protocol (04.22.20 @ 20:29):  No acute hemorrhage, mass effect or extra-axial collections.    CT Abdomen and Pelvis w/wo IV Cont (04.22.20 @ 09:09) >  1.  3 mm nonobstructing stone in the left ureterovesicular junction.  2.  7 mm nonobstructing stone in the lower pole right kidney.  3.  Subacute hemorrhage in the right rectus abdominis along the anterior sheath, measures 1.6 cm in thickness and extends from the umbilicus to the inferior myotendinous junction.  4.  Cirrhosis. Mild splenomegaly is new from the prior and suggestive of portal venous hypertension.

## 2020-04-28 NOTE — PROGRESS NOTE ADULT - ASSESSMENT
· Assessment		  65 year old male w chronic AFib w Watchman device, CHF, COPD, HTN, alcohol abuse, obesity came to ED w EMS c/o SOB since the morning of 04-18.  Per EMS, pt was in rapid afib in 180s upon EMS arrival with O2 sat in 80 acute respiratory failure with hypercapnia due to COPD exacerbation .    Neurology called after episode of altered mental status and slurred speech on 4/22.    -CT head negative for any acute stroke.  -Likely hypoxic/metabolic encephalopathy  -EEG showing evidence of encephalopathy but no epileptiform activity.   -Repeat CT head or MRI when stable. At this time he would be unable to tolerate MRI.  -Sedation stopped on 4/26. Still confused. Will continue to observe  - CIWA protocol.    *AF with RVR   Per Cardiology  -Not on Ac with Watchman procedure.  -continue present meds      * COPD/Respiratory failure  Per Pulmonologist    Will follow up as needed.

## 2020-04-28 NOTE — CHART NOTE - NSCHARTNOTEFT_GEN_A_CORE
Enteral nutrition recommendations:      Pt currently on po diet with extremely poor intake. Since admission patient with inadequate intake/NPO x 10 days. If feasible recommend advancing to enteral nutrition until po intake >50% of ENN.       · Enteral Recommendations  1) Continuous enteral feeds via NGT or Corpak- Jevity 1.5 @ 20cc/hr and increase Q4 hours 10cc/hr until goal rate of 50cc/hr (total volume 1000ml). + 6 packs of prosource TF daily. (total provided 1740 calories/ 760 ml free water in enteral formula).   2) Additional water flush @ 60cc/hr (total volume 1200ml daily)      · Additional Recommendations  1) initiate nutrition support until po intake meets >50% of ENN.   2) monitor daily weights   3) Maintain aspiration precautions, back of bed >35 degrees.   4) monitor labs/lytes and replete as needed.   5) po supplements if unable to initiate enteral feeds- Gelatein plus jello po BID (40gm protein), Honey thick Ensure plus shake 8oz po BID.   6) initiate bowel regimen.   7) monitor strict I & O's

## 2020-04-28 NOTE — PROGRESS NOTE ADULT - SUBJECTIVE AND OBJECTIVE BOX
Interval History:  4/28/20: On BPAP at this time.     MEDICATIONS  (STANDING):  ALBUTerol    90 MICROgram(s) HFA Inhaler 2 Puff(s) Inhalation every 4 hours  aspirin enteric coated 81 milliGRAM(s) Oral daily  budesonide 160 MICROgram(s)/formoterol 4.5 MICROgram(s) Inhaler 2 Puff(s) Inhalation two times a day  clopidogrel Tablet 75 milliGRAM(s) Oral daily  digoxin  Injectable 0.125 milliGRAM(s) IV Push daily  diltiazem Infusion 10 mG/Hr (10 mL/Hr) IV Continuous <Continuous>  enoxaparin Injectable 40 milliGRAM(s) SubCutaneous every 12 hours  folic acid Injectable 1 milliGRAM(s) IV Push daily  furosemide   Injectable 40 milliGRAM(s) IV Push daily  gabapentin 400 milliGRAM(s) Oral three times a day  LORazepam   Injectable 2 milliGRAM(s) IV Push every 6 hours  methylPREDNISolone sodium succinate Injectable 40 milliGRAM(s) IV Push every 12 hours  multivitamin 1 Tablet(s) Oral daily  pantoprazole  Injectable 40 milliGRAM(s) IV Push daily  thiamine 100 milliGRAM(s) Oral daily  tiotropium 18 MICROgram(s) Capsule 1 Capsule(s) Inhalation daily    MEDICATIONS  (PRN):  traZODone 100 milliGRAM(s) Oral at bedtime PRN insomnia      Allergies    Ceclor (Rash)  Duricef (Rash)    Intolerances        PHYSICAL EXAM:  Vital Signs Last 24 Hrs  T(F): 99.3 (04-28-20 @ 04:00)  HR: 107 (04-28-20 @ 06:00)  BP: 144/95 (04-28-20 @ 06:00)  RR: 29 (04-28-20 @ 06:00)    GENERAL: NAD, well-groomed, well-developed  HEAD:  Atraumatic, Normocephalic  Neuro:  On BPAP making communication difficult  Awake, looks at examiner. Does follow simple commands such as squeezing hands b/l  CN: PERRL. EOMI. Difficult to assess facial weakness due to BPAP mask  Motor: moving b/l upper extremities and right lower extremity. Left lower leg amputation    LABS:                        17.7   14.05 )-----------( 133      ( 28 Apr 2020 06:48 )             54.5     04-28    154<H>  |  114<H>  |  50<H>  ----------------------------<  172<H>  3.8   |  36<H>  |  1.07    Ca    8.6      28 Apr 2020 06:48  Phos  3.2     04-28  Mg     2.8     04-28            RADIOLOGY & ADDITIONAL STUDIES:    CT Head  < from: CT Brain Stroke Protocol (04.22.20 @ 20:29) >  Impression:    No acute hemorrhage, mass effect or extra-axial collections.      < from: CT Angio Neck w/ IV Cont (04.22.20 @ 21:58) >  IMPRESSION:  Patent cervical and intracranial major arterial vasculature without evidence of abrupt vessel cut off, hemodynamically significant stenosis, aneurysm, or dissection.    EEG :   < from: EEG Awake and Asleep (04.23.20 @ 10:30) >  Impression:  This is an mild abnormal EEG due to the presence of  1. Mild generalized background slowing may be on the basis of underlying cerebral dysfunction may be on the basis of underlying metabolic, toxic or structural pathology or due to sedation.  2. Excess beta activity noted related to medication effect. Clinical correlation recommended.  3. No epileptiform activity noted. Clinical correlation recommended.  Repeat study without sedation if clinically indicated.

## 2020-04-28 NOTE — CONSULT NOTE ADULT - ASSESSMENT
66 y/o male with h/o chronic A.Fib with Watchman device, CHF, COPD, HTN, obesity was admitted on 4/18 for worsening SOB. In ER he was found with rapid A.fib and was placed on NRB. He tested COVID-19 PCR negative x 3. Noted with hypercapnea and placed on BiPAP, then intubated and placed on ventilatory support. He was extubated on 4/26. He is reported with MDRO's in sputum c/s. He was treated with azithromycin from 4/20 to 4/25.     1. Acute respiratory failure. Febrile syndrome. 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 Allergy to PCN and cephalosporines.  -leukocytosis  -obtain BC x 2  -sputum c/s reviewed  -start vancomycin 1 gm IV q12h and cipro 400 mg IV q12h  -reason for abx use and side effects reviewed with patient; monitor BMP and vancomycin trough levels   -respiratory care  -old chart reviewed to assess prior cultures  -monitor temps  -f/u CBC  -supportive care  2. Other issues:   -care per medicine 64 y/o male with h/o chronic A.Fib with Watchman device, CHF, COPD, HTN, obesity was admitted on 4/18 for worsening SOB. In ER he was found with rapid A.fib and was placed on NRB. He tested COVID-19 PCR negative x 3. Noted with hypercapnea and placed on BiPAP, then intubated and placed on ventilatory support. He was extubated on 4/26. He is reported with MDRO's in sputum c/s. He was treated with azithromycin from 4/20 to 4/25.     1. Acute respiratory failure. Febrile syndrome. 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 Allergy to PCN and cephalosporines.  -leukocytosis  -obtain BC x 2  -sputum c/s reviewed  -start vancomycin 1 gm IV q12h and cipro 400 mg IV q12h  -reason for abx use and side effects reviewed with patient; monitor BMP and vancomycin trough levels   -respiratory care  -old chart reviewed to assess prior cultures  -obtain CXR  -monitor temps  -f/u CBC  -supportive care  2. Other issues:   -care per medicine

## 2020-04-28 NOTE — DIETITIAN INITIAL EVALUATION ADULT. - ADD RECOMMEND
1) initiate nutrition support until po intake meets >50% of ENN. 2) monitor daily weights 3) Maintain aspiration precautions, back of bed >35 degrees. 4) monitor labs/lytes and replete as needed. 5) po supplements if unable to initiate enteral feeds- Gelatein plus jello po BID (40gm protein), Honey thick Ensure plus shake 8oz po BID. 1) initiate nutrition support until po intake meets >50% of ENN. 2) monitor daily weights 3) Maintain aspiration precautions, back of bed >35 degrees. 4) monitor labs/lytes and replete as needed. 5) po supplements if unable to initiate enteral feeds- Gelatein plus jello po BID (40gm protein), Honey thick Ensure plus shake 8oz po BID. 6) initiate bowel regimen. 7) monitor strict I & O's

## 2020-04-28 NOTE — DIETITIAN INITIAL EVALUATION ADULT. - OTHER INFO
65 year old male w chronic AFib w Watchman device, CHF, COPD, HTN, alcohol abuse, obesity came to ED w EMS c/o SOB since the morning of 04-18.  Per EMS, pt was in rapid afib in 180s upon EMS arrival with O2 sat in 80 acute respiratory failure with hypercapnia due to COPD exacerbation . CT scan negative for stroke. CIWA protocol. MVI, Folic acid, and thiamine ordered. 65 year old male w chronic AFib w Watchman device, CHF, COPD, HTN, alcohol abuse, obesity came to ED w EMS c/o SOB since the morning of 04-18.  Per EMS, pt was in rapid afib in 180s upon EMS arrival with O2 sat in 80 acute respiratory failure with hypercapnia due to COPD exacerbation . CT scan negative for stroke. CIWA protocol. MVI, Folic acid, and thiamine ordered. Currently on BIPAP, pt with poor mentation and as per RN pt with poor intake. Discussed inadequate nutrition x 10 days with PA, will either place CORPAK or start PPN if needed. S/P SLP evaluation with recommendations of Pureed diet with honey thick liquids with total assistance. BKA noted (Weight adjusted for amputation). MAP >65. I & O's- Last BM documented 4/21 x 7 days (no bowel regimen noted). 65 year old male w chronic AFib w Watchman device, CHF, COPD, HTN, alcohol abuse, obesity came to ED w EMS c/o SOB since the morning of 04-18.  Per EMS, pt was in rapid afib in 180s upon EMS arrival with O2 sat in 80 acute respiratory failure with hypercapnia due to COPD exacerbation . CT scan negative for stroke. CIWA protocol. MVI, Folic acid, and thiamine ordered. Currently on BIPAP, pt with poor mentation and as per RN pt with poor intake (<10%). Discussed inadequate nutrition x 10 days with PA, will either place CORPAK or start PPN if needed. S/P SLP evaluation with recommendations of Pureed diet with honey thick liquids with total assistance. BKA noted (Weight adjusted for amputation). MAP >65. I & O's- Last BM documented 4/21 x 7 days (no bowel regimen noted). Unable to perform NFPE due to mentation status. Significant weight loss noted x 4 days 4%/12#- possibly due to fluid shifting and suboptimal nutrition. Will place suggestions of enteral orders to meet pts nutritional needs, see below.

## 2020-04-28 NOTE — DIETITIAN INITIAL EVALUATION ADULT. - PERTINENT LABORATORY DATA
04-28 Na154 mmol/L<H> Glu 172 mg/dL<H> K+ 3.8 mmol/L Cr  1.07 mg/dL BUN 50 mg/dL<H> Phos 3.2 mg/dL Alb n/a   PAB n/a

## 2020-04-28 NOTE — DIETITIAN INITIAL EVALUATION ADULT. - PHYSICAL APPEARANCE
other (specify) Skin intact with +2 generalized edema documented. Jitendra score-10 (high risk for skin breakdown)

## 2020-04-28 NOTE — PROGRESS NOTE ADULT - PROBLEM SELECTOR PLAN 2
Rate not controlled. possibly due to fever and dehydration , increase free water   continue  iv Digoxin to help. AC not in place as has Watchman.

## 2020-04-28 NOTE — PROGRESS NOTE ADULT - ASSESSMENT
66 y/o male pmhx  chronic Afib s/p Watchman device ( 4/19), HTN, prior CVAs, ETOH abuse, smoker, COPD, HFpEF, Cirrhosis, s/p L BKA admitted on 4/18 w/ acute COPD exacerbation.  Complicated by acute hypercapnic respiratory failure, AMS requiring intubation. Further complicated by afib w/ RVR and MSSA/pseudomonas in the sputum.    Plan:  Neuro: Encephalopathic, resolving. Hold off on any sedation for now  Resp: Acute on chronic respiratory failure, BiPAP PRN and qhs. Respiratory status remains tenuous. High risk for re intubation Nebs. Further taper steroids tomorrow.   CV: HFrEF, on lasix daily.   - Afib w/ RVR. On Cardizem infusion, titrating for HR <100.  Started on PO Cardizem in attempt to taper off gtt. Dig loaded. No AC as had watchman in the past.   GI: Start tube feeds   ID: MSSA/ carbapenem resistant pseudomonas in sputum. Started on Vanc  and Cipro today. Remains febrile w/ rising WBC.  Send procal in am,. ID following

## 2020-04-28 NOTE — PROGRESS NOTE ADULT - ASSESSMENT
PROBLEMS;    Ac on chronic hypercapnic & hypoxamic respiratory failure-s/p extubation-worsening ventilation  sputum MRSA  afib with RVR  OHS/VIJAY  AC flare of COPD/chronic vs acute on chronic bronchitis  Mild interstitial lung disease-NSIP h/o smoking  COVID negativex2  Pulmonary hypertension  Nonobstructing stone in the left ureterovesicular junction/ nonobstructing stone in the lower pole right kidney.  Subacute hemorrhage in the right rectus abdominis along the anterior sheath, measures 1.6 cm in thickness and extends from the umbilicus to the inferior myotendinous junction  Cirrhosis  Mild splenomegaly-portal venous hypertension.  Chronic afib w Watchman device  CHF  BPH  GERD  ETOH abuse  seizures disorder    PLAN;    pulmonary better  bipap 16/6-35%  ABG monitoring  iv solumedrols 40mg q12hr  iv cardizem for rapid afib  iv vanco/cipr0  aerosols  supportive care  covid repeat testing-neg  d/w staff

## 2020-04-28 NOTE — CONSULT NOTE ADULT - SUBJECTIVE AND OBJECTIVE BOX
Patient is a 65y old  Male who presents with a chief complaint of acute hypoxemic, hypercapneic resp failure  COPD exacerbation    HPI:  64 y/o male with h/o chronic A.Fib with Watchman device, CHF, COPD, HTN, obesity was admitted on 4/18 for worsening SOB. In ER he was found with rapid A.fib and was placed on NRB. He tested COVID-19 PCR negative x 3. Noted with hypercapnea and placed on BiPAP, then intubated and placed on ventilatory support. He was extubated on 4/26. He is reported with MDRO's in sputum c/s. He was treated with azithromycin from 4/20 to 4/25.       PAST MEDICAL HX  Alcohol abuse    Alcohol withdrawal    Anemia    AFIB atrial fibrillation    CHF (congestive heart failure)    Chronic atrial fibrillation    Chronic CHF    Chronic obstructive pulmonary disease (COPD)    Cirrhosis    Collapsed lung    COPD without exacerbation    Emphysema of lung    Falls    GERD (gastroesophageal reflux disease)    HTN hypertension    Meningitis    Poor historian    Presence of Watchman left atrial appendage closure device.    PAST SURGICAL HX  Presence of Watchman left atrial appendage closure device    S/P BKA (below knee amputation) unilateral, left    Unilateral amputation of lower extremity below knee.    Meds: per reconciliation sheet, noted below  MEDICATIONS  (STANDING):  ALBUTerol    90 MICROgram(s) HFA Inhaler 2 Puff(s) Inhalation every 4 hours  aspirin enteric coated 81 milliGRAM(s) Oral daily  budesonide 160 MICROgram(s)/formoterol 4.5 MICROgram(s) Inhaler 2 Puff(s) Inhalation two times a day  clopidogrel Tablet 75 milliGRAM(s) Oral daily  dextrose 5% + sodium chloride 0.45%. 1000 milliLiter(s) (75 mL/Hr) IV Continuous <Continuous>  digoxin  Injectable 0.125 milliGRAM(s) IV Push daily  diltiazem Infusion 10 mG/Hr (10 mL/Hr) IV Continuous <Continuous>  enoxaparin Injectable 40 milliGRAM(s) SubCutaneous every 12 hours  folic acid Injectable 1 milliGRAM(s) IV Push daily  furosemide   Injectable 40 milliGRAM(s) IV Push daily  gabapentin 400 milliGRAM(s) Oral three times a day  hydrALAZINE 25 milliGRAM(s) Oral every 6 hours  LORazepam   Injectable 2 milliGRAM(s) IV Push every 6 hours  methylPREDNISolone sodium succinate Injectable 40 milliGRAM(s) IV Push every 12 hours  multivitamin 1 Tablet(s) Oral daily  pantoprazole  Injectable 40 milliGRAM(s) IV Push daily  thiamine 100 milliGRAM(s) Oral daily  tiotropium 18 MICROgram(s) Capsule 1 Capsule(s) Inhalation daily    MEDICATIONS  (PRN):  traZODone 100 milliGRAM(s) Oral at bedtime PRN insomnia    Allergies    Ceclor (Rash)  Duricef (Rash)    Intolerances      Social: former smoking, prior alcohol abuse, no illegal drugs; no recent travel, no exposure to TB  FAMILY HISTORY:  No pertinent family history in first degree relatives  Family history unknown: Adopted    no history of premature cardiovascular disease in first degree relatives    ROS: the patient denies HA, no seizures, no dizziness, no sore throat, no nasal congestion, no blurry vision, no CP, no palpitations, has SOB, has cough, no abdominal pain, no diarrhea, no N/V, no dysuria, no leg pain, no claudication, no rash, no joint aches, no rectal pain or bleeding, no night sweats  All other systems reviewed and are negative    Vital Signs Last 24 Hrs  T(C): 37.4 (28 Apr 2020 04:00), Max: 38.4 (27 Apr 2020 14:00)  T(F): 99.3 (28 Apr 2020 04:00), Max: 101.1 (27 Apr 2020 14:00)  HR: 113 (28 Apr 2020 10:00) (99 - 132)  BP: 169/88 (28 Apr 2020 10:00) (129/80 - 176/105)  BP(mean): 107 (28 Apr 2020 10:00) (80 - 126)  RR: 32 (28 Apr 2020 10:00) (25 - 42)  SpO2: 96% (28 Apr 2020 10:00) (91% - 97%)  Daily     Daily     PE:    Constitutional:  No acute distress  HEENT: NC/AT, EOMI, PERRLA, conjunctivae clear; ears and nose atraumatic; pharynx benign  Neck: supple; thyroid not palpable  Back: no tenderness  Respiratory: respiratory effort normal; b/l rhonchi  Cardiovascular: S1S2 regular, no murmurs  Abdomen: soft, not tender, not distended, positive BS; no liver or spleen organomegaly  Genitourinary: no suprapubic tenderness  Lymphatic: no LN palpable  Musculoskeletal: no muscle tenderness, no joint swelling or tenderness  Extremities: no pedal edema  Neurological/ Psychiatric: AxOx3, judgement and insight impaired; moving all extremities  Skin: no rashes; no palpable lesions    Labs: all available labs reviewed                        17.7   14.05 )-----------( 133      ( 28 Apr 2020 06:48 )             54.5     04-28    154<H>  |  114<H>  |  50<H>  ----------------------------<  172<H>  3.8   |  36<H>  |  1.07    Ca    8.6      28 Apr 2020 06:48  Phos  3.2     04-28  Mg     2.8     04-28    COVID-19 PCR . (04.26.20 @ 00:02)    COVID-19 PCR: NotDetec      Culture - Blood (collected 26 Apr 2020 01:01)  Source: .Blood None  Preliminary Report (27 Apr 2020 10:02):    No growth to date.    Culture - Blood (collected 26 Apr 2020 00:56)  Source: .Blood None  Preliminary Report (27 Apr 2020 10:02):    No growth to date.    Culture - Sputum (collected 25 Apr 2020 01:00)  Source: .Sputum Sputum  trap  Gram Stain (26 Apr 2020 12:29):    Moderate polymorphonuclear leukocytes per low power field    No Squamous epithelial cells per low power field    Rare Gram Positive Rods per oil power field    Rare Gram Positive Cocci in Pairs and Chains per oil power field  Final Report (28 Apr 2020 10:53):    Few Pseudomonas aeruginosa (Carbapenem Resistant)    Moderate Methicillin resistant Staphylococcus aureus    Normal Respiratory Faith present  Organism: Pseudomonas aeruginosa (Carbapenem Resistant)  Methicillin resistant Staphylococcus aureus (28 Apr 2020 10:44)  Organism: Pseudomonas aeruginosa (Carbapenem Resistant) (28 Apr 2020 10:44)      -  Amikacin: S <=16      -  Aztreonam: R >16      -  Cefepime: R >16      -  Ceftazidime: R >16      -  Ciprofloxacin: S <=0.25      -  Gentamicin: S 4      -  Imipenem: R >8      -  Levofloxacin: S <=0.5      -  Meropenem: R >8      -  Piperacillin/Tazobactam: R >64      -  Tobramycin: S <=2      Method Type: LADY  Organism: Methicillin resistant Staphylococcus aureus (28 Apr 2020 10:40)      -  Amoxicillin/Clavulanic Acid: R >4/2      -  Ampicillin: R >8      -  Ampicillin/Sulbactam: R <=8/4      -  Cefazolin: R >16      -  Ceftriaxone: R >32      -  Ciprofloxacin: R >2      -  Clindamycin: R >4      -  Daptomycin: S 0.5      -  Erythromycin: R >4      -  Gentamicin: S <=1 Should not be used as monotherapy      -  Levofloxacin: R >4      -  Linezolid: S 4      -  Meropenem: R >8      -  Moxifloxacin(Aerobic): R >4      -  Oxacillin: R >2      -  Penicillin: R >8      -  RIF- Rifampin: S <=1 Should not be used as monotherapy      -  Tetra/Doxy: S 2      -  Trimethoprim/Sulfamethoxazole: S <=0.5/9.5      -  Vancomycin: S 2      Method Type: LADY    Culture - Urine (collected 21 Apr 2020 15:51)  Source: .Urine Clean Catch (Midstream)  Final Report (22 Apr 2020 17:03):    <10,000 CFU/mL Normal Urogenital Faith    Culture - Blood (collected 18 Apr 2020 19:43)  Source: .Blood Blood-Peripheral  Final Report (24 Apr 2020 01:01):    No Growth Final    Radiology: all available radiological tests reviewed    < from: Xray Chest 1 View AP/PA. (04.25.20 @ 10:12) >  Percent of LEFT lung opacification: Clear  Percent of RIGHT lung opacification: Clear  Change in lung opacification from most recent x-ray (<=3 days): Stable  Change from prior dated 3 or more days (same admission): No Prior    < end of copied text >      Advanced directives addressed: full resuscitation Patient is a 65y old  Male who presents with a chief complaint of acute hypoxemic, hypercapneic resp failure  COPD exacerbation    HPI:  66 y/o male with h/o chronic A.Fib with Watchman device, CHF, COPD, HTN, obesity was admitted on 4/18 for worsening SOB. In ER he was found with rapid A.fib and was placed on NRB. He tested COVID-19 PCR negative x 3. Noted with hypercapnea and placed on BiPAP, then intubated and placed on ventilatory support. He was extubated on 4/26. He is reported with MDRO's in sputum c/s. He was treated with azithromycin from 4/20 to 4/25.       PAST MEDICAL HX  Alcohol abuse    Alcohol withdrawal    Anemia    AFIB atrial fibrillation    CHF (congestive heart failure)    Chronic atrial fibrillation    Chronic CHF    Chronic obstructive pulmonary disease (COPD)    Cirrhosis    Collapsed lung    COPD without exacerbation    Emphysema of lung    Falls    GERD (gastroesophageal reflux disease)    HTN hypertension    Meningitis    Poor historian    Presence of Watchman left atrial appendage closure device.    PAST SURGICAL HX  Presence of Watchman left atrial appendage closure device    S/P BKA (below knee amputation) unilateral, left    Unilateral amputation of lower extremity below knee.    Meds: per reconciliation sheet, noted below  MEDICATIONS  (STANDING):  ALBUTerol    90 MICROgram(s) HFA Inhaler 2 Puff(s) Inhalation every 4 hours  aspirin enteric coated 81 milliGRAM(s) Oral daily  budesonide 160 MICROgram(s)/formoterol 4.5 MICROgram(s) Inhaler 2 Puff(s) Inhalation two times a day  clopidogrel Tablet 75 milliGRAM(s) Oral daily  dextrose 5% + sodium chloride 0.45%. 1000 milliLiter(s) (75 mL/Hr) IV Continuous <Continuous>  digoxin  Injectable 0.125 milliGRAM(s) IV Push daily  diltiazem Infusion 10 mG/Hr (10 mL/Hr) IV Continuous <Continuous>  enoxaparin Injectable 40 milliGRAM(s) SubCutaneous every 12 hours  folic acid Injectable 1 milliGRAM(s) IV Push daily  furosemide   Injectable 40 milliGRAM(s) IV Push daily  gabapentin 400 milliGRAM(s) Oral three times a day  hydrALAZINE 25 milliGRAM(s) Oral every 6 hours  LORazepam   Injectable 2 milliGRAM(s) IV Push every 6 hours  methylPREDNISolone sodium succinate Injectable 40 milliGRAM(s) IV Push every 12 hours  multivitamin 1 Tablet(s) Oral daily  pantoprazole  Injectable 40 milliGRAM(s) IV Push daily  thiamine 100 milliGRAM(s) Oral daily  tiotropium 18 MICROgram(s) Capsule 1 Capsule(s) Inhalation daily    MEDICATIONS  (PRN):  traZODone 100 milliGRAM(s) Oral at bedtime PRN insomnia    Allergies    Ceclor (Rash)  Duricef (Rash)    Intolerances      Social: former smoking, prior alcohol abuse, no illegal drugs; no recent travel, no exposure to TB  FAMILY HISTORY:  No pertinent family history in first degree relatives  Family history unknown: Adopted    no history of premature cardiovascular disease in first degree relatives    ROS: the patient is poorly verbal; on NRB; unobtainable  All other systems reviewed and are negative    Vital Signs Last 24 Hrs  T(C): 37.4 (28 Apr 2020 04:00), Max: 38.4 (27 Apr 2020 14:00)  T(F): 99.3 (28 Apr 2020 04:00), Max: 101.1 (27 Apr 2020 14:00)  HR: 113 (28 Apr 2020 10:00) (99 - 132)  BP: 169/88 (28 Apr 2020 10:00) (129/80 - 176/105)  BP(mean): 107 (28 Apr 2020 10:00) (80 - 126)  RR: 32 (28 Apr 2020 10:00) (25 - 42)  SpO2: 96% (28 Apr 2020 10:00) (91% - 97%)  Daily     Daily     PE:    Constitutional:  No acute distress  HEENT: NC/AT, EOMI, PERRLA, conjunctivae clear; ears and nose atraumatic; pharynx benign  Neck: supple; thyroid not palpable  Back: no tenderness  Respiratory: respiratory effort normal; b/l rhonchi  Cardiovascular: S1S2 regular, no murmurs  Abdomen: soft, not tender, not distended, positive BS; no liver or spleen organomegaly  Genitourinary: no suprapubic tenderness  Lymphatic: no LN palpable  Musculoskeletal: no muscle tenderness, no joint swelling or tenderness  Extremities: no pedal edema  Neurological/ Psychiatric: AxOx2, judgement and insight impaired; moving all extremities  Skin: no rashes; no palpable lesions    Labs: all available labs reviewed                        17.7   14.05 )-----------( 133      ( 28 Apr 2020 06:48 )             54.5     04-28    154<H>  |  114<H>  |  50<H>  ----------------------------<  172<H>  3.8   |  36<H>  |  1.07    Ca    8.6      28 Apr 2020 06:48  Phos  3.2     04-28  Mg     2.8     04-28    COVID-19 PCR . (04.26.20 @ 00:02)    COVID-19 PCR: NotDetec      Culture - Blood (collected 26 Apr 2020 01:01)  Source: .Blood None  Preliminary Report (27 Apr 2020 10:02):    No growth to date.    Culture - Blood (collected 26 Apr 2020 00:56)  Source: .Blood None  Preliminary Report (27 Apr 2020 10:02):    No growth to date.    Culture - Sputum (collected 25 Apr 2020 01:00)  Source: .Sputum Sputum  trap  Gram Stain (26 Apr 2020 12:29):    Moderate polymorphonuclear leukocytes per low power field    No Squamous epithelial cells per low power field    Rare Gram Positive Rods per oil power field    Rare Gram Positive Cocci in Pairs and Chains per oil power field  Final Report (28 Apr 2020 10:53):    Few Pseudomonas aeruginosa (Carbapenem Resistant)    Moderate Methicillin resistant Staphylococcus aureus    Normal Respiratory Faith present  Organism: Pseudomonas aeruginosa (Carbapenem Resistant)  Methicillin resistant Staphylococcus aureus (28 Apr 2020 10:44)  Organism: Pseudomonas aeruginosa (Carbapenem Resistant) (28 Apr 2020 10:44)      -  Amikacin: S <=16      -  Aztreonam: R >16      -  Cefepime: R >16      -  Ceftazidime: R >16      -  Ciprofloxacin: S <=0.25      -  Gentamicin: S 4      -  Imipenem: R >8      -  Levofloxacin: S <=0.5      -  Meropenem: R >8      -  Piperacillin/Tazobactam: R >64      -  Tobramycin: S <=2      Method Type: LADY  Organism: Methicillin resistant Staphylococcus aureus (28 Apr 2020 10:40)      -  Amoxicillin/Clavulanic Acid: R >4/2      -  Ampicillin: R >8      -  Ampicillin/Sulbactam: R <=8/4      -  Cefazolin: R >16      -  Ceftriaxone: R >32      -  Ciprofloxacin: R >2      -  Clindamycin: R >4      -  Daptomycin: S 0.5      -  Erythromycin: R >4      -  Gentamicin: S <=1 Should not be used as monotherapy      -  Levofloxacin: R >4      -  Linezolid: S 4      -  Meropenem: R >8      -  Moxifloxacin(Aerobic): R >4      -  Oxacillin: R >2      -  Penicillin: R >8      -  RIF- Rifampin: S <=1 Should not be used as monotherapy      -  Tetra/Doxy: S 2      -  Trimethoprim/Sulfamethoxazole: S <=0.5/9.5      -  Vancomycin: S 2      Method Type: LADY    Culture - Urine (collected 21 Apr 2020 15:51)  Source: .Urine Clean Catch (Midstream)  Final Report (22 Apr 2020 17:03):    <10,000 CFU/mL Normal Urogenital Faith    Culture - Blood (collected 18 Apr 2020 19:43)  Source: .Blood Blood-Peripheral  Final Report (24 Apr 2020 01:01):    No Growth Final    Radiology: all available radiological tests reviewed    < from: Xray Chest 1 View AP/PA. (04.25.20 @ 10:12) >  Percent of LEFT lung opacification: Clear  Percent of RIGHT lung opacification: Clear  Change in lung opacification from most recent x-ray (<=3 days): Stable  Change from prior dated 3 or more days (same admission): No Prior    < end of copied text >      Advanced directives addressed: full resuscitation

## 2020-04-28 NOTE — PROGRESS NOTE ADULT - SUBJECTIVE AND OBJECTIVE BOX
Patient is a 65y old  Male who presents with a chief complaint of acute hypoxemic, hypercapneic resp failure  COPD exacerbation (28 Apr 2020 18:06)      BRIEF HOSPITAL COURSE: 64 y/o male pmhx  chronic Afib s/p Watchman device ( 4/19), HTN, prior CVAs, ETOH abuse, smoker, COPD, HFpEF, Cirrhosis, s/p L BKA admitted on 4/18 w/ acute COPD exacerbation. Hospital course complicated by ETOH withdrawal requiring IV Ativan. Pt was RRT early this am due AMS and respiratory distress.  CT head done negative for any acute pathology. ABG done w/ hypercapnic respiratory failure .He was intubated by Anesthesia. Extubated on 4/26. Complicated by afib w/ RVR     Events last 24 hours: Remains on Cardizem infusion. Appears lethargic this am, but wake up, follows commands AAOx3. Placed Corpak for meds and feeds. Able to come off BiPAP this am. Dig loaded today. Started on abx for MSSA/ pseudomonas in sputum       PAST MEDICAL & SURGICAL HISTORY:  Chronic CHF  COPD without exacerbation  Atrial fibrillation  Presence of Watchman left atrial appendage closure device  Hypertension  GERD (gastroesophageal reflux disease)  Chronic obstructive pulmonary disease (COPD)  Anemia  Falls  Meningitis  Collapsed lung  Alcohol withdrawal  Emphysema of lung  Cirrhosis  CHF (congestive heart failure)  Poor historian  Alcohol abuse  Chronic atrial fibrillation  Unilateral amputation of lower extremity below knee  Presence of Watchman left atrial appendage closure device  S/P BKA (below knee amputation) unilateral, left      Review of Systems:  CONSTITUTIONAL: No fever, chills, or fatigue  EYES: No eye pain, visual disturbances, or discharge  ENMT:  No difficulty hearing, tinnitus, vertigo; No sinus or throat pain  NECK: No pain or stiffness  RESPIRATORY: No cough, wheezing, chills or hemoptysis; No shortness of breath  CARDIOVASCULAR: No chest pain, palpitations, dizziness, or leg swelling  GASTROINTESTINAL: No abdominal or epigastric pain. No nausea, vomiting, or hematemesis; No diarrhea or constipation. No melena or hematochezia.  GENITOURINARY: No dysuria, frequency, hematuria, or incontinence  NEUROLOGICAL: No headaches, memory loss, loss of strength, numbness, or tremors  SKIN: No itching, burning, rashes, or lesions   MUSCULOSKELETAL: No joint pain or swelling; No muscle, back, or extremity pain  PSYCHIATRIC: No depression, anxiety, mood swings, or difficulty sleeping      Medications:  ciprofloxacin   IVPB 400 milliGRAM(s) IV Intermittent every 12 hours  vancomycin  IVPB 1000 milliGRAM(s) IV Intermittent every 12 hours    digoxin  Injectable 0.125 milliGRAM(s) IV Push daily  diltiazem    Tablet 30 milliGRAM(s) Oral every 6 hours  diltiazem Infusion 10 mG/Hr IV Continuous <Continuous>  furosemide   Injectable 40 milliGRAM(s) IV Push daily  hydrALAZINE 25 milliGRAM(s) Oral every 6 hours    ALBUTerol    90 MICROgram(s) HFA Inhaler 2 Puff(s) Inhalation every 4 hours  budesonide 160 MICROgram(s)/formoterol 4.5 MICROgram(s) Inhaler 2 Puff(s) Inhalation two times a day  tiotropium 18 MICROgram(s) Capsule 1 Capsule(s) Inhalation daily    gabapentin 400 milliGRAM(s) Oral three times a day  LORazepam   Injectable 2 milliGRAM(s) IV Push every 6 hours  traZODone 100 milliGRAM(s) Oral at bedtime PRN      aspirin enteric coated 81 milliGRAM(s) Oral daily  clopidogrel Tablet 75 milliGRAM(s) Oral daily  enoxaparin Injectable 40 milliGRAM(s) SubCutaneous every 12 hours    pantoprazole  Injectable 40 milliGRAM(s) IV Push daily  polyethylene glycol 3350 17 Gram(s) Oral daily PRN      methylPREDNISolone sodium succinate Injectable 40 milliGRAM(s) IV Push every 12 hours    folic acid Injectable 1 milliGRAM(s) IV Push daily  multivitamin 1 Tablet(s) Oral daily  thiamine 100 milliGRAM(s) Oral daily                ICU Vital Signs Last 24 Hrs  T(C): 37.8 (28 Apr 2020 15:29), Max: 37.8 (28 Apr 2020 15:29)  T(F): 100 (28 Apr 2020 15:29), Max: 100 (28 Apr 2020 15:29)  HR: 104 (28 Apr 2020 17:00) (99 - 122)  BP: 129/64 (28 Apr 2020 17:00) (129/64 - 185/95)  BP(mean): 80 (28 Apr 2020 17:00) (80 - 126)  ABP: --  ABP(mean): --  RR: 37 (28 Apr 2020 17:00) (25 - 42)  SpO2: 98% (28 Apr 2020 17:00) (91% - 99%)          I&O's Detail    27 Apr 2020 07:01  -  28 Apr 2020 07:00  --------------------------------------------------------  IN:    diltiazem Infusion: 420 mL  Total IN: 420 mL    OUT:    Voided: 1300 mL  Total OUT: 1300 mL    Total NET: -880 mL            LABS:                        17.7   14.05 )-----------( 133      ( 28 Apr 2020 06:48 )             54.5     04-28    154<H>  |  114<H>  |  50<H>  ----------------------------<  172<H>  3.8   |  36<H>  |  1.07    Ca    8.6      28 Apr 2020 06:48  Phos  3.2     04-28  Mg     2.8     04-28            CAPILLARY BLOOD GLUCOSE            CULTURES:  Culture Results:   No growth to date. (04-26-20 @ 01:01)  Culture Results:   No growth to date. (04-26-20 @ 00:56)  Culture Results:   Few Pseudomonas aeruginosa (Carbapenem Resistant)  Moderate Methicillin resistant Staphylococcus aureus  Normal Respiratory Faith present (04-25-20 @ 01:00)      Physical Examination:    General: Mild resp distress. AAOX3. Lethargic     HEENT: Pupils equal, reactive to light.  Symmetric.    PULM: Diminshed breath sounds B/L.      CVS: Afib w/ RVR     ABD: Soft, nondistended, nontender, normoactive bowel sounds, no masses    EXT: No edema, nontender    SKIN: Warm and well perfused, no rashes noted.    NEURO: A&Ox3, follows commands can move all extremities spontaneously     RADIOLOGY: < from: Xray Kidney Ureter Bladder (04.28.20 @ 13:55) >  EXAM:  XR KUB 1 VIEW                            PROCEDURE DATE:  04/28/2020          INTERPRETATION:  Chest one view    HISTORY: Feeding tube placement    COMPARISON STUDY: 4/25/2020    Frontal expiratory view of the lower chest shows the heart to be similarly enlarged in size. Feeding tube tip crosses the expected level of the GE junction.    The lungs show partial clearing of the right upper lobe and there is no evidence of pneumothorax nor pleural effusion.    IMPRESSION:  Feeding tube as noted.    < end of copied text >      CRITICAL CARE TIME SPENT: 45 min  Evaluating/treating patient, reviewing data/labs/imaging, discussing case with multidisciplinary team, discussing plan/goals of care with patient/family. Non-inclusive of procedure time.

## 2020-04-28 NOTE — DIETITIAN INITIAL EVALUATION ADULT. - ENTERAL
1) Continuous enteral feeds via NGT or Corpak- Jevity 1.5 @ 20cc/hr and increase Q4 hours 10cc/hr until goal rate of 50cc/hr (total volume 1000ml). + 6 packs of prosource TF daily. (total provided 1740 calories/ 760 ml free water in enteral formula). 2) Additional water flush @ 60cc/hr (total volume 1200ml daily)

## 2020-04-28 NOTE — DIETITIAN INITIAL EVALUATION ADULT. - 20 CAL FROM
Pt. has not had a bowel movement in 2 days. Parents have tried to treat at home today with a Glycerin Suppository @ 0400, but has yet to pass stool. Parents deny fevers or difficulty POing. Pt. crying with tears and wet diaper in triage. Vaccines UTD, no pmhx.
[id number]

## 2020-04-28 NOTE — CHART NOTE - NSCHARTNOTEFT_GEN_A_CORE
Upon Nutritional Assessment by the Registered Dietitian your patient was determined to meet criteria / has evidence of the following diagnosis/diagnoses:          [ ]  Mild Protein Calorie Malnutrition        [ ]  Moderate Protein Calorie Malnutrition        [x ] Severe Protein Calorie Malnutrition        [ ] Unspecified Protein Calorie Malnutrition        [ ] Underweight / BMI <19        [ ] Morbid Obesity / BMI > 40      Findings as based on:  •  Comprehensive nutrition assessment and consultation  •  Calorie counts (nutrient intake analysis)  •  Food acceptance and intake status from observations by staff  •  Follow up  •  Patient education  •  Intervention secondary to interdisciplinary rounds  •   concerns      *********Malnutrition severe protein/calorie malnutrition in context of acute on chronic illness.   Etiology AEB suboptimal intake to meet nutrient needs 2/2 acute respiratory distress.   Signs/Symptoms poor intake/NPO x 10 days, significant weight loss, +2 edema.   Goal/Expected Outcome Tolerance of po dt and if needed enteral nutrition to meet >80% of ENN. Reduced s/s of malnutrition.    · Additional Recommendations  1) initiate nutrition support until po intake meets >50% of ENN.  2) monitor daily weights   3) Maintain aspiration precautions, back of bed >35 degrees.   4) monitor labs/lytes and replete as needed.   5) po supplements if unable to initiate enteral feeds- Gelatein plus jello po BID (40gm protein), Honey thick Ensure plus shake 8oz po BID.  6) initiate bowel regimen.  7) monitor strict I & O's         PROVIDER Section:     By signing this assessment you are acknowledging and agree with the diagnosis/diagnoses assigned by the Registered Dietitian    Comments:

## 2020-04-28 NOTE — DIETITIAN INITIAL EVALUATION ADULT. - PERTINENT MEDS FT
MEDICATIONS  (STANDING):  ALBUTerol    90 MICROgram(s) HFA Inhaler 2 Puff(s) Inhalation every 4 hours  aspirin enteric coated 81 milliGRAM(s) Oral daily  budesonide 160 MICROgram(s)/formoterol 4.5 MICROgram(s) Inhaler 2 Puff(s) Inhalation two times a day  clopidogrel Tablet 75 milliGRAM(s) Oral daily  dextrose 5% + sodium chloride 0.45%. 1000 milliLiter(s) (75 mL/Hr) IV Continuous <Continuous>  digoxin  Injectable 0.125 milliGRAM(s) IV Push daily  diltiazem Infusion 10 mG/Hr (10 mL/Hr) IV Continuous <Continuous>  enoxaparin Injectable 40 milliGRAM(s) SubCutaneous every 12 hours  folic acid Injectable 1 milliGRAM(s) IV Push daily  furosemide   Injectable 40 milliGRAM(s) IV Push daily  gabapentin 400 milliGRAM(s) Oral three times a day  hydrALAZINE 25 milliGRAM(s) Oral every 6 hours  LORazepam   Injectable 2 milliGRAM(s) IV Push every 6 hours  methylPREDNISolone sodium succinate Injectable 40 milliGRAM(s) IV Push every 12 hours  multivitamin 1 Tablet(s) Oral daily  pantoprazole  Injectable 40 milliGRAM(s) IV Push daily  thiamine 100 milliGRAM(s) Oral daily  tiotropium 18 MICROgram(s) Capsule 1 Capsule(s) Inhalation daily    MEDICATIONS  (PRN):  traZODone 100 milliGRAM(s) Oral at bedtime PRN insomnia

## 2020-04-28 NOTE — PROGRESS NOTE ADULT - SUBJECTIVE AND OBJECTIVE BOX
Subjective:    pat on bipap 16/6 35%, lathergy, ABG - ( 26 Apr 2020 15:59 )  pH, Arterial: 7.39  pH, Blood: x     /  pCO2: 56    /  pO2: 87    / HCO3: 33    / Base Excess: 6.1   /  SaO2: 96        Home Medications:  albuterol 2.5 mg/3 mL (0.083%) inhalation solution: 3 milliliter(s) inhaled every 6 hours, As Needed -for shortness of breath and/or wheezing (19 Apr 2020 03:12)  gabapentin 400 mg oral capsule: 1 cap(s) orally 3 times a day (19 Apr 2020 03:12)  pantoprazole 40 mg oral granule, enteric coated: 40 milligram(s) orally once a day (19 Apr 2020 03:12)  Spiriva 18 mcg inhalation capsule: 1 cap(s) inhaled once a day (19 Apr 2020 03:12)  tamsulosin 0.4 mg oral capsule: 1 cap(s) orally once a day (at bedtime) (19 Apr 2020 03:12)  traZODone 50 mg oral tablet: 2 tab(s) orally once a day (at bedtime), As Needed (19 Apr 2020 03:12)    MEDICATIONS  (STANDING):  ALBUTerol    90 MICROgram(s) HFA Inhaler 2 Puff(s) Inhalation every 4 hours  aspirin enteric coated 81 milliGRAM(s) Oral daily  budesonide 160 MICROgram(s)/formoterol 4.5 MICROgram(s) Inhaler 2 Puff(s) Inhalation two times a day  ciprofloxacin   IVPB 400 milliGRAM(s) IV Intermittent every 12 hours  clopidogrel Tablet 75 milliGRAM(s) Oral daily  dextrose 5% + sodium chloride 0.45%. 1000 milliLiter(s) (75 mL/Hr) IV Continuous <Continuous>  digoxin  Injectable 0.125 milliGRAM(s) IV Push daily  diltiazem    Tablet 30 milliGRAM(s) Oral every 6 hours  diltiazem Infusion 10 mG/Hr (10 mL/Hr) IV Continuous <Continuous>  enoxaparin Injectable 40 milliGRAM(s) SubCutaneous every 12 hours  folic acid Injectable 1 milliGRAM(s) IV Push daily  furosemide   Injectable 40 milliGRAM(s) IV Push daily  gabapentin 400 milliGRAM(s) Oral three times a day  hydrALAZINE 25 milliGRAM(s) Oral every 6 hours  LORazepam   Injectable 2 milliGRAM(s) IV Push every 6 hours  methylPREDNISolone sodium succinate Injectable 40 milliGRAM(s) IV Push every 12 hours  multivitamin 1 Tablet(s) Oral daily  pantoprazole  Injectable 40 milliGRAM(s) IV Push daily  thiamine 100 milliGRAM(s) Oral daily  tiotropium 18 MICROgram(s) Capsule 1 Capsule(s) Inhalation daily  vancomycin  IVPB 1000 milliGRAM(s) IV Intermittent every 12 hours    MEDICATIONS  (PRN):  traZODone 100 milliGRAM(s) Oral at bedtime PRN insomnia      Allergies    Ceclor (Rash)  Duricef (Rash)    Intolerances        Vital Signs Last 24 Hrs  T(C): 37.5 (28 Apr 2020 12:01), Max: 37.5 (28 Apr 2020 12:01)  T(F): 99.5 (28 Apr 2020 12:01), Max: 99.5 (28 Apr 2020 12:01)  HR: 111 (28 Apr 2020 13:00) (99 - 132)  BP: 150/90 (28 Apr 2020 13:00) (129/80 - 176/105)  BP(mean): 104 (28 Apr 2020 13:00) (80 - 126)  RR: 28 (28 Apr 2020 13:00) (25 - 42)  SpO2: 96% (28 Apr 2020 13:00) (91% - 97%)      PHYSICAL EXAMINATION:    NECK:  Supple. No lymphadenopathy. Jugular venous pressure not elevated. Carotids equal.   HEART:   The cardiac impulse has a normal quality. Reg., Nl S1 and S2.  There are no murmurs, rubs or gallops noted  CHEST:  Chest few rhonchi to auscultation. Normal respiratory effort.  ABDOMEN:  Soft and nontender.   EXTREMITIES:  There is no edema.       LABS:                        17.7   14.05 )-----------( 133      ( 28 Apr 2020 06:48 )             54.5     04-28    154<H>  |  114<H>  |  50<H>  ----------------------------<  172<H>  3.8   |  36<H>  |  1.07    Ca    8.6      28 Apr 2020 06:48  Phos  3.2     04-28  Mg     2.8     04-28    -  Aztreonam: R >16 (04.25.20 @ 01:00)    Culture - Sputum . (04.25.20 @ 01:00)    -  Trimethoprim/Sulfamethoxazole: S <=0.5/9.5    -  Vancomycin: S 2    -  Tobramycin: S <=2    -  Piperacillin/Tazobactam: R >64    -  Tetra/Doxy: S 2    -  RIF- Rifampin: S <=1 Should not be used as monotherapy    Gram Stain:   Moderate polymorphonuclear leukocytes per low power field  No Squamous epithelial cells per low power field  Rare Gram Positive Rods per oil power field  Rare Gram Positive Cocci in Pairs and Chains per oil power field    -  Aztreonam: R >16    -  Amikacin: S <=16    -  Amoxicillin/Clavulanic Acid: R >4/2    -  Ampicillin: R >8    -  Ampicillin/Sulbactam: R <=8/4    -  Ciprofloxacin: R >2    -  Cefazolin: R >16    -  Ciprofloxacin: S <=0.25    -  Cefepime: R >16    -  Ceftazidime: R >16    -  Ceftriaxone: R >32    -  Meropenem: R >8    -  Meropenem: R >8    -  Levofloxacin: R >4    -  Levofloxacin: S <=0.5    -  Penicillin: R >8    -  Oxacillin: R >2    -  Moxifloxacin(Aerobic): R >4    -  Linezolid: S 4    -  Imipenem: R >8    -  Clindamycin: R >4    -  Daptomycin: S 0.5    -  Erythromycin: R >4    -  Gentamicin: S <=1 Should not be used as monotherapy    -  Gentamicin: S 4    Specimen Source: .Sputum Sputum  trap    Culture Results:   Few Pseudomonas aeruginosa (Carbapenem Resistant)  Moderate Methicillin resistant Staphylococcus aureus  Normal Respiratory Faith present    Organism Identification: Pseudomonas aeruginosa (Carbapenem Resistant)  Methicillin resistant Staphylococcus aureus    Organism: Methicillin resistant Staphylococcus aureus    Organism: Pseudomonas aeruginosa (Carbapenem Resistant)    Method Type: LADY    Method Type: LADY

## 2020-04-29 LAB
ANION GAP SERPL CALC-SCNC: 5 MMOL/L — SIGNIFICANT CHANGE UP (ref 5–17)
BASE EXCESS BLDA CALC-SCNC: 10.6 MMOL/L — HIGH (ref -2–2)
BLOOD GAS COMMENTS ARTERIAL: SIGNIFICANT CHANGE UP
BLOOD GAS COMMENTS ARTERIAL: SIGNIFICANT CHANGE UP
BUN SERPL-MCNC: 67 MG/DL — HIGH (ref 7–23)
CALCIUM SERPL-MCNC: 8.3 MG/DL — LOW (ref 8.5–10.1)
CHLORIDE SERPL-SCNC: 107 MMOL/L — SIGNIFICANT CHANGE UP (ref 96–108)
CO2 SERPL-SCNC: 37 MMOL/L — HIGH (ref 22–31)
CREAT SERPL-MCNC: 1.58 MG/DL — HIGH (ref 0.5–1.3)
GAS PNL BLDA: SIGNIFICANT CHANGE UP
GLUCOSE SERPL-MCNC: 181 MG/DL — HIGH (ref 70–99)
GRAM STN FLD: SIGNIFICANT CHANGE UP
HCO3 BLDA-SCNC: 38 MMOL/L — HIGH (ref 21–29)
HCT VFR BLD CALC: 58.3 % — CRITICAL HIGH (ref 39–50)
HGB BLD-MCNC: 18 G/DL — HIGH (ref 13–17)
MAGNESIUM SERPL-MCNC: 2.9 MG/DL — HIGH (ref 1.6–2.6)
MCHC RBC-ENTMCNC: 30.9 GM/DL — LOW (ref 32–36)
MCHC RBC-ENTMCNC: 31.9 PG — SIGNIFICANT CHANGE UP (ref 27–34)
MCV RBC AUTO: 103.2 FL — HIGH (ref 80–100)
ORGANISM # SPEC MICROSCOPIC CNT: SIGNIFICANT CHANGE UP
PCO2 BLDA: 58 MMHG — HIGH (ref 32–46)
PH BLDA: 7.43 — SIGNIFICANT CHANGE UP (ref 7.35–7.45)
PHOSPHATE SERPL-MCNC: 6.9 MG/DL — HIGH (ref 2.5–4.5)
PLATELET # BLD AUTO: 126 K/UL — LOW (ref 150–400)
PO2 BLDA: 260 MMHG — HIGH (ref 74–108)
POTASSIUM SERPL-MCNC: 4.3 MMOL/L — SIGNIFICANT CHANGE UP (ref 3.5–5.3)
POTASSIUM SERPL-SCNC: 4.3 MMOL/L — SIGNIFICANT CHANGE UP (ref 3.5–5.3)
PROCALCITONIN SERPL-MCNC: 0.18 NG/ML — HIGH (ref 0.02–0.1)
RBC # BLD: 5.65 M/UL — SIGNIFICANT CHANGE UP (ref 4.2–5.8)
RBC # FLD: 13.2 % — SIGNIFICANT CHANGE UP (ref 10.3–14.5)
SAO2 % BLDA: 99 % — HIGH (ref 92–96)
SODIUM SERPL-SCNC: 149 MMOL/L — HIGH (ref 135–145)
SPECIMEN SOURCE: SIGNIFICANT CHANGE UP
WBC # BLD: 27.13 K/UL — HIGH (ref 3.8–10.5)
WBC # FLD AUTO: 27.13 K/UL — HIGH (ref 3.8–10.5)

## 2020-04-29 PROCEDURE — 99233 SBSQ HOSP IP/OBS HIGH 50: CPT

## 2020-04-29 PROCEDURE — 99232 SBSQ HOSP IP/OBS MODERATE 35: CPT

## 2020-04-29 PROCEDURE — 70450 CT HEAD/BRAIN W/O DYE: CPT | Mod: 26

## 2020-04-29 PROCEDURE — 99231 SBSQ HOSP IP/OBS SF/LOW 25: CPT

## 2020-04-29 PROCEDURE — 71045 X-RAY EXAM CHEST 1 VIEW: CPT | Mod: 26

## 2020-04-29 RX ORDER — DEXMEDETOMIDINE HYDROCHLORIDE IN 0.9% SODIUM CHLORIDE 4 UG/ML
0.1 INJECTION INTRAVENOUS
Qty: 200 | Refills: 0 | Status: DISCONTINUED | OUTPATIENT
Start: 2020-04-29 | End: 2020-04-29

## 2020-04-29 RX ORDER — ASPIRIN/CALCIUM CARB/MAGNESIUM 324 MG
81 TABLET ORAL DAILY
Refills: 0 | Status: DISCONTINUED | OUTPATIENT
Start: 2020-04-30 | End: 2020-05-12

## 2020-04-29 RX ORDER — NOREPINEPHRINE BITARTRATE/D5W 8 MG/250ML
0.05 PLASTIC BAG, INJECTION (ML) INTRAVENOUS
Qty: 8 | Refills: 0 | Status: DISCONTINUED | OUTPATIENT
Start: 2020-04-29 | End: 2020-04-30

## 2020-04-29 RX ORDER — DEXMEDETOMIDINE HYDROCHLORIDE IN 0.9% SODIUM CHLORIDE 4 UG/ML
0.5 INJECTION INTRAVENOUS
Qty: 200 | Refills: 0 | Status: DISCONTINUED | OUTPATIENT
Start: 2020-04-29 | End: 2020-04-30

## 2020-04-29 RX ORDER — PANTOPRAZOLE SODIUM 20 MG/1
40 TABLET, DELAYED RELEASE ORAL DAILY
Refills: 0 | Status: DISCONTINUED | OUTPATIENT
Start: 2020-04-29 | End: 2020-06-08

## 2020-04-29 RX ORDER — PROPOFOL 10 MG/ML
40 INJECTION, EMULSION INTRAVENOUS
Qty: 1000 | Refills: 0 | Status: DISCONTINUED | OUTPATIENT
Start: 2020-04-29 | End: 2020-05-01

## 2020-04-29 RX ORDER — SODIUM CHLORIDE 9 MG/ML
500 INJECTION, SOLUTION INTRAVENOUS ONCE
Refills: 0 | Status: COMPLETED | OUTPATIENT
Start: 2020-04-29 | End: 2020-04-29

## 2020-04-29 RX ORDER — HEPARIN SODIUM 5000 [USP'U]/ML
5000 INJECTION INTRAVENOUS; SUBCUTANEOUS EVERY 8 HOURS
Refills: 0 | Status: DISCONTINUED | OUTPATIENT
Start: 2020-04-30 | End: 2020-05-08

## 2020-04-29 RX ORDER — ACETAMINOPHEN 500 MG
650 TABLET ORAL EVERY 6 HOURS
Refills: 0 | Status: DISCONTINUED | OUTPATIENT
Start: 2020-04-29 | End: 2020-04-29

## 2020-04-29 RX ORDER — ACETAMINOPHEN 500 MG
650 TABLET ORAL EVERY 6 HOURS
Refills: 0 | Status: DISCONTINUED | OUTPATIENT
Start: 2020-04-29 | End: 2020-07-08

## 2020-04-29 RX ORDER — CHLORHEXIDINE GLUCONATE 213 G/1000ML
15 SOLUTION TOPICAL EVERY 12 HOURS
Refills: 0 | Status: DISCONTINUED | OUTPATIENT
Start: 2020-04-29 | End: 2020-05-29

## 2020-04-29 RX ADMIN — Medication 0.12 MILLIGRAM(S): at 11:14

## 2020-04-29 RX ADMIN — Medication 11.1 MICROGRAM(S)/KG/MIN: at 18:08

## 2020-04-29 RX ADMIN — SODIUM CHLORIDE 500 MILLILITER(S): 9 INJECTION, SOLUTION INTRAVENOUS at 18:08

## 2020-04-29 RX ADMIN — GABAPENTIN 400 MILLIGRAM(S): 400 CAPSULE ORAL at 14:41

## 2020-04-29 RX ADMIN — Medication 2 MILLIGRAM(S): at 02:24

## 2020-04-29 RX ADMIN — PROPOFOL 28.3 MICROGRAM(S)/KG/MIN: 10 INJECTION, EMULSION INTRAVENOUS at 18:08

## 2020-04-29 RX ADMIN — Medication 250 MILLIGRAM(S): at 17:44

## 2020-04-29 RX ADMIN — Medication 200 MILLIGRAM(S): at 17:34

## 2020-04-29 RX ADMIN — Medication 40 MILLIGRAM(S): at 05:28

## 2020-04-29 RX ADMIN — ENOXAPARIN SODIUM 40 MILLIGRAM(S): 100 INJECTION SUBCUTANEOUS at 11:12

## 2020-04-29 RX ADMIN — Medication 81 MILLIGRAM(S): at 11:12

## 2020-04-29 RX ADMIN — Medication 25 MILLIGRAM(S): at 00:39

## 2020-04-29 RX ADMIN — PANTOPRAZOLE SODIUM 40 MILLIGRAM(S): 20 TABLET, DELAYED RELEASE ORAL at 11:17

## 2020-04-29 RX ADMIN — Medication 2 MILLIGRAM(S): at 13:54

## 2020-04-29 RX ADMIN — Medication 60 MILLIGRAM(S): at 05:28

## 2020-04-29 RX ADMIN — Medication 25 MILLIGRAM(S): at 05:28

## 2020-04-29 RX ADMIN — Medication 650 MILLIGRAM(S): at 18:07

## 2020-04-29 RX ADMIN — Medication 2 MILLIGRAM(S): at 05:28

## 2020-04-29 RX ADMIN — Medication 200 MILLIGRAM(S): at 05:28

## 2020-04-29 RX ADMIN — Medication 2 MILLIGRAM(S): at 00:39

## 2020-04-29 RX ADMIN — Medication 1 MILLIGRAM(S): at 11:19

## 2020-04-29 RX ADMIN — CHLORHEXIDINE GLUCONATE 15 MILLILITER(S): 213 SOLUTION TOPICAL at 17:31

## 2020-04-29 RX ADMIN — Medication 250 MILLIGRAM(S): at 09:09

## 2020-04-29 RX ADMIN — Medication 10 MG/HR: at 05:27

## 2020-04-29 RX ADMIN — Medication 100 MILLIGRAM(S): at 11:19

## 2020-04-29 RX ADMIN — DEXMEDETOMIDINE HYDROCHLORIDE IN 0.9% SODIUM CHLORIDE 14.7 MICROGRAM(S)/KG/HR: 4 INJECTION INTRAVENOUS at 14:10

## 2020-04-29 RX ADMIN — Medication 15 MILLILITER(S): at 11:20

## 2020-04-29 RX ADMIN — CLOPIDOGREL BISULFATE 75 MILLIGRAM(S): 75 TABLET, FILM COATED ORAL at 11:13

## 2020-04-29 RX ADMIN — Medication 60 MILLIGRAM(S): at 00:39

## 2020-04-29 NOTE — PROGRESS NOTE ADULT - SUBJECTIVE AND OBJECTIVE BOX
Patient is a 65y old  Male who presents with a chief complaint of acute hypoxemic, hypercapneic resp failure  COPD exacerbation (28 Apr 2020 18:06)      BRIEF HOSPITAL COURSE: 64 y/o male PMHx  chronic Afib s/p Watchman's device (4/19), HTN, prior CVAs, ETOH abuse, smoker, COPD, HFpEF, Cirrhosis, s/p L BKA admitted on 4/18 with acute COPD exacerbation. Hospital course complicated by ETOH withdrawal, delirium tremens. Pt was RRT due AMS and respiratory distress. CT head negative for any acute pathology. ABG with acute on chronic hypercapnic respiratory failure. Patient was intubated and then extubated on 4/26. Course complicated by AFib w/ RVR on Cardizem infusion, and ongoing encephalopathy        Events last 24 hours: Encephalopathic, lethargic. Afib RVR on Cardizem gtt. Febrile. Hypoxic with escalating FiO2 requirements, on 50% VM with SpO2 90%. IVF d/c and given Zaroxolyn/Lasix         PAST MEDICAL & SURGICAL HISTORY:  Chronic CHF  COPD without exacerbation  Atrial fibrillation  Presence of Watchman left atrial appendage closure device  Hypertension  GERD (gastroesophageal reflux disease)  Chronic obstructive pulmonary disease (COPD)  Anemia  Falls  Meningitis  Collapsed lung  Alcohol withdrawal  Emphysema of lung  Cirrhosis  CHF (congestive heart failure)  Poor historian  Alcohol abuse  Chronic atrial fibrillation  Unilateral amputation of lower extremity below knee  Presence of Watchman left atrial appendage closure device  S/P BKA (below knee amputation) unilateral, left        SOCIAL HISTORY: UATO due to AMS      Review of Systems: Due to altered mental status, subjective information was not able to be obtained from the patient. History was obtained, to the extent possible, from review of the chart and collateral sources of information.      Medications:  ciprofloxacin   IVPB 400 milliGRAM(s) IV Intermittent every 12 hours  vancomycin  IVPB 1000 milliGRAM(s) IV Intermittent every 12 hours  digoxin  Injectable 0.125 milliGRAM(s) IV Push daily  diltiazem    Tablet 60 milliGRAM(s) Oral every 6 hours  diltiazem Infusion 10 mG/Hr IV Continuous <Continuous>  furosemide   Injectable 40 milliGRAM(s) IV Push daily  hydrALAZINE 25 milliGRAM(s) Oral every 6 hours  ALBUTerol    90 MICROgram(s) HFA Inhaler 2 Puff(s) Inhalation every 4 hours  budesonide 160 MICROgram(s)/formoterol 4.5 MICROgram(s) Inhaler 2 Puff(s) Inhalation two times a day  tiotropium 18 MICROgram(s) Capsule 1 Capsule(s) Inhalation daily  gabapentin 400 milliGRAM(s) Oral three times a day  LORazepam   Injectable 2 milliGRAM(s) IV Push every 6 hours  aspirin enteric coated 81 milliGRAM(s) Oral daily  clopidogrel Tablet 75 milliGRAM(s) Oral daily  enoxaparin Injectable 40 milliGRAM(s) SubCutaneous daily  polyethylene glycol 3350 17 Gram(s) Oral daily PRN  methylPREDNISolone sodium succinate Injectable 40 milliGRAM(s) IV Push every 12 hours  folic acid 1 milliGRAM(s) Oral daily  multivitamin/minerals/iron Oral Solution (CENTRUM) 15 milliLiter(s) Oral daily  thiamine 100 milliGRAM(s) Oral daily        ICU Vital Signs Last 24 Hrs  T(C): 38.3 (28 Apr 2020 23:07), Max: 38.9 (28 Apr 2020 20:00)  T(F): 101 (28 Apr 2020 23:07), Max: 102 (28 Apr 2020 20:00)  HR: 102 (29 Apr 2020 00:00) (99 - 122)  BP: 135/63 (29 Apr 2020 00:00) (129/64 - 185/95)  BP(mean): 79 (29 Apr 2020 00:00) (79 - 126)  ABP: --  ABP(mean): --  RR: 34 (29 Apr 2020 00:00) (25 - 42)  SpO2: 91% (29 Apr 2020 00:00) (90% - 99%)          I&O's Detail    27 Apr 2020 07:01  -  28 Apr 2020 07:00  --------------------------------------------------------  IN:    diltiazem Infusion: 420 mL  Total IN: 420 mL    OUT:    Voided: 1300 mL  Total OUT: 1300 mL    Total NET: -880 mL      28 Apr 2020 07:01  -  29 Apr 2020 00:01  --------------------------------------------------------  IN:    IV PiggyBack: 450 mL  Total IN: 450 mL    OUT:    Voided: 1 mL  Total OUT: 1 mL    Total NET: 449 mL            LABS:                        17.7   14.05 )-----------( 133      ( 28 Apr 2020 06:48 )             54.5     04-28    154<H>  |  114<H>  |  50<H>  ----------------------------<  172<H>  3.8   |  36<H>  |  1.07    Ca    8.6      28 Apr 2020 06:48  Phos  3.2     04-28  Mg     2.8     04-28            CAPILLARY BLOOD GLUCOSE            CULTURES:  Culture Results:   No growth to date. (04-26 @ 01:01)  Culture Results:   No growth to date. (04-26 @ 00:56)  Culture Results:   Few Pseudomonas aeruginosa (Carbapenem Resistant)  Moderate Methicillin resistant Staphylococcus aureus  Normal Respiratory Faith present (04-25 @ 01:00)        Physical Examination:    General: Toxic, encephalopathic    HEENT: Pupils equal, reactive to light.  Symmetric.    PULM: Diffuse ronchi and expiratory wheezes    CVS: AFib 's    ABD: Softly distended, nontender    EXT: Mild generalized anasarca. LLE BKA    SKIN: Warm and well perfused, skin flushed diffusely     NEURO: RASS -3        RADIOLOGY: prior images reviewed     INVASIVE LINES:   INDWELLING NOVAK:   VTE PROPHYLAXIS: Lovenox   CAM ICU: +   CODE STATUS: FULL      CRITICAL CARE TIME SPENT: 60 minutes spent performing frequent bedside reassessments and augmenting plan of care to address problems of acute critical illness that pose high probability of life threatening deterioration and/or end organ damage/dysfunction, non-inclusive of time spent on procedures performed.

## 2020-04-29 NOTE — PROGRESS NOTE ADULT - PROBLEM SELECTOR PLAN 2
Rate not controlled. possibly due to fever and dehydration, cont. free water, continue  iv Digoxin to help. Has Watchman, no anticoag indicated. Rate controlled.  cont. dig & iv Digoxin.  Has Watchman, no anticoag indicated.

## 2020-04-29 NOTE — PROCEDURE NOTE - NSTRACHPOSTINTU_RESP_A_CORE
Positive end tidal Co2 noted/Appropriate capnography/Breath sounds bilateral/Breath sounds equal/Chest excursion noted/Chest X-Ray

## 2020-04-29 NOTE — PROGRESS NOTE ADULT - SUBJECTIVE AND OBJECTIVE BOX
Subjective:    pat re-intubated & on vent sedated    Home Medications:  albuterol 2.5 mg/3 mL (0.083%) inhalation solution: 3 milliliter(s) inhaled every 6 hours, As Needed -for shortness of breath and/or wheezing (19 Apr 2020 03:12)  gabapentin 400 mg oral capsule: 1 cap(s) orally 3 times a day (19 Apr 2020 03:12)  pantoprazole 40 mg oral granule, enteric coated: 40 milligram(s) orally once a day (19 Apr 2020 03:12)  Spiriva 18 mcg inhalation capsule: 1 cap(s) inhaled once a day (19 Apr 2020 03:12)  tamsulosin 0.4 mg oral capsule: 1 cap(s) orally once a day (at bedtime) (19 Apr 2020 03:12)  traZODone 50 mg oral tablet: 2 tab(s) orally once a day (at bedtime), As Needed (19 Apr 2020 03:12)    MEDICATIONS  (STANDING):  ALBUTerol    90 MICROgram(s) HFA Inhaler 2 Puff(s) Inhalation every 4 hours  budesonide 160 MICROgram(s)/formoterol 4.5 MICROgram(s) Inhaler 2 Puff(s) Inhalation two times a day  chlorhexidine 0.12% Liquid 15 milliLiter(s) Oral Mucosa every 12 hours  ciprofloxacin   IVPB 400 milliGRAM(s) IV Intermittent every 12 hours  clopidogrel Tablet 75 milliGRAM(s) Oral daily  dexMEDEtomidine Infusion 0.5 MICROgram(s)/kG/Hr (14.7 mL/Hr) IV Continuous <Continuous>  digoxin  Injectable 0.125 milliGRAM(s) IV Push daily  diltiazem    Tablet 60 milliGRAM(s) Oral every 6 hours  diltiazem Infusion 10 mG/Hr (10 mL/Hr) IV Continuous <Continuous>  folic acid 1 milliGRAM(s) Oral daily  gabapentin 400 milliGRAM(s) Oral three times a day  lactated ringers Bolus 500 milliLiter(s) IV Bolus once  LORazepam   Injectable 2 milliGRAM(s) IV Push every 6 hours  multivitamin/minerals/iron Oral Solution (CENTRUM) 15 milliLiter(s) Oral daily  pantoprazole  Injectable 40 milliGRAM(s) IV Push daily  propofol Infusion 40 MICROgram(s)/kG/Min (28.3 mL/Hr) IV Continuous <Continuous>  thiamine 100 milliGRAM(s) Oral daily  tiotropium 18 MICROgram(s) Capsule 1 Capsule(s) Inhalation daily  vancomycin  IVPB 1000 milliGRAM(s) IV Intermittent every 12 hours    MEDICATIONS  (PRN):  polyethylene glycol 3350 17 Gram(s) Oral daily PRN Constipation      Allergies    Ceclor (Rash)  Duricef (Rash)    Intolerances        Vital Signs Last 24 Hrs  T(C): 38.4 (29 Apr 2020 15:05), Max: 38.9 (28 Apr 2020 20:00)  T(F): 101.2 (29 Apr 2020 15:05), Max: 102 (28 Apr 2020 20:00)  HR: 68 (29 Apr 2020 15:00) (68 - 117)  BP: 76/46 (29 Apr 2020 15:00) (76/46 - 165/85)  BP(mean): 52 (29 Apr 2020 15:00) (52 - 101)  RR: 16 (29 Apr 2020 15:00) (16 - 42)  SpO2: 95% (29 Apr 2020 15:00) (84% - 100%)  Mode: AC/ CMV (Assist Control/ Continuous Mandatory Ventilation)  RR (machine): 16  TV (machine): 550  FiO2: 50  PEEP: 5  ITime: 1  MAP: 11  PIP: 29      PHYSICAL EXAMINATION:    NECK:  Supple. No lymphadenopathy. Jugular venous pressure not elevated. Carotids equal.   HEART:   The cardiac impulse has a normal quality. Reg., Nl S1 and S2.  There are no murmurs, rubs or gallops noted  CHEST:  Chest rhonchi to auscultation. Normal respiratory effort.  ABDOMEN:  Soft and nontender.   EXTREMITIES:  There is no edema.       LABS:                        18.0   27.13 )-----------( 126      ( 29 Apr 2020 07:28 )             58.3     04-29    149<H>  |  107  |  67<H>  ----------------------------<  181<H>  4.3   |  37<H>  |  1.58<H>    Ca    8.3<L>      29 Apr 2020 07:28  Phos  6.9     04-29  Mg     2.9     04-29      Culture - Sputum . (04.25.20 @ 01:00)    -  Gentamicin: S <=1 Should not be used as monotherapy    -  Gentamicin: S 4    -  Levofloxacin: R >4    -  Levofloxacin: S <=0.5    -  Imipenem: R >8    -  Linezolid: S 4    -  Meropenem: R >8    -  Meropenem: R >8    -  Moxifloxacin(Aerobic): R >4    -  Oxacillin: R >2    -  Penicillin: R >8    -  Piperacillin/Tazobactam: R >64    -  RIF- Rifampin: S <=1 Should not be used as monotherapy    -  Tetra/Doxy: S 2    -  Tobramycin: S <=2    -  Trimethoprim/Sulfamethoxazole: S <=0.5/9.5    -  Vancomycin: S 2    Gram Stain:   Moderate polymorphonuclear leukocytes per low power field  No Squamous epithelial cells per low power field  Rare Gram Positive Rods per oil power field  Rare Gram Positive Cocci in Pairs and Chains per oil power field    -  Amikacin: S <=16    -  Amoxicillin/Clavulanic Acid: R >4/2    -  Ampicillin: R >8    -  Ampicillin/Sulbactam: R <=8/4    -  Aztreonam: R >16    -  Cefazolin: R >16    -  Cefepime: R >16    -  Ceftazidime: R >16    -  Ceftriaxone: R >32    -  Ciprofloxacin: R >2    -  Clindamycin: R >4    -  Daptomycin: S 0.5    -  Erythromycin: R >4    -  Ciprofloxacin: S <=0.25    Specimen Source: .Sputum Sputum  trap    Culture Results:   Few Pseudomonas aeruginosa (Carbapenem Resistant)  Moderate Methicillin resistant Staphylococcus aureus  Normal Respiratory Faith present    Organism Identification: Pseudomonas aeruginosa (Carbapenem Resistant)  Methicillin resistant Staphylococcus aureus    Organism: Pseudomonas aeruginosa (Carbapenem Resistant)    Organism: Methicillin resistant Staphylococcus aureus    Method Type: LADY    Method Type: LADY

## 2020-04-29 NOTE — PROGRESS NOTE ADULT - ASSESSMENT
64 y/o male with h/o chronic A.Fib with Watchman device, CHF, COPD, HTN, obesity was admitted on 4/18 for worsening SOB. In ER he was found with rapid A.fib and was placed on NRB. He tested COVID-19 PCR negative x 3. Noted with hypercapnea and placed on BiPAP, then intubated and placed on ventilatory support. He was extubated on 4/26. He is reported with MDRO's in sputum c/s. He was treated with azithromycin from 4/20 to 4/25.     1. Acute respiratory failure. Febrile syndrome. 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 Allergy to PCN and cephalosporines.  -leukocytosis is worse  -respiratory worse  -encephalopathy  -cultures reviewed  -on vancomycin 1 gm IV q12h and cipro 400 mg IV q12h # 2  -tolerating abx well so far; no side effects noted  -obtain vancomycin trough level   -respiratory care  -continue abx coverage  -repeat CXR reviewed - increased infiltrates  -continue IV abx coverage   -monitor temps  -f/u CBC  -supportive care  2. Other issues:   -care per medicine

## 2020-04-29 NOTE — PROGRESS NOTE ADULT - ASSESSMENT
64 y/o male PMHx  chronic Afib s/p Watchman's device (4/19), HTN, prior CVAs, ETOH abuse, smoker, COPD, HFpEF, Cirrhosis, s/p L BKA admitted on 4/18 with acute COPD exacerbation, with hospital course complicated by ETOH withdrawal, delirium tremens, acute on chronic hypercapnic respiratory failure necessitating emergent intubation, AFib with RVR. Now with severe sepsis secondary to MRSA and CRE Pseudomonas pneumonia.      Neuro: Toxic metabolic encephalopathy, multifactorial secondary to severe sepsis and DTs, as well as necessary treatment with benzodiazepines for active alcohol withdrawal with hallucinations. Ammonia level normal.    Pulm: Worsening hypoxemia, suspect acute on chronic diastolic heart failure with pulmonary edema. D/C IV fluids, diuresing with Zaroxolyn and Lasix. High risk for intubation with worsening hypoxemia, underlying pneumonia, and poor mentation. Tapering steroids.    CV: Afib RVR in the setting of ongoing sepsis, fevers. On Cardizem gtt @ 20 mg/hr, PO Cardizem, and Digoxin. Cardizem PO increased to 60mg q6h. Supplementing lytes to K >4, Mg >2 for optimal arrhythmia suppression. HTN better controlled with addition of Hydralazine. Increasing PO Cardizem to optimize rate control, will continue to monitor BP and increase Hydralazine as needed     GI: Start Tube Feeds via NGT in lieu of IV fluids. D/C IV PPI     Renal: Monitoring renal indices. Free water added for hyperchloremic hypernatremia.    ID: Severe sepsis MRSA and CRE Pseudomonas PNA, on Vancomycin and Cipro, respectively. Patient has PCN allergy- Called [facility] to check for sensitivity to Colistin as alternate agent. F/u sensitivity testing. Blood cultures thus far negative to date. Trending fever curve.    Heme: Lovenox SC for DVT ppx, change to once daily. Continue Aspirin and Plavix

## 2020-04-29 NOTE — PROGRESS NOTE ADULT - ASSESSMENT
PROBLEMS;    Ac on chronic hypercapnic & hypoxamic respiratory failure-s/p extubation-worsening ventilation  sputum-Few Pseudomonas aeruginosa (Carbapenem Resistant)/Moderate Methicillin resistant Staphylococcus aureus  afib with RVR  [facility]/VI[facility]AY  AC flare of COPD/chronic vs acute on chronic bronchitis  Mild interstitial lung disease-NSIP h/o smoking  COVID negativex2  Pulmonary hypertension  Nonobstructing stone in the left ureterovesicular junction/ nonobstructing stone in the lower pole right kidney.  Subacute hemorrhage in the right rectus abdominis along the anterior sheath, measures 1.6 cm in thickness and extends from the umbilicus to the inferior myotendinous junction  Cirrhosis  Mild splenomegaly-portal venous hypertension.  Chronic afib w Watchman device  CHF  BPH  GERD  ETOH abuse  seizures disorder    PLAN;    vent support  po prednisone  iv cardizem for rapid afib  iv vanco/iv cipr0  aerosols  supportive care  covid repeat testing-neg  d/w staff

## 2020-04-29 NOTE — PROGRESS NOTE ADULT - PROBLEM SELECTOR PROBLEM 6
Obesity
Rectus sheath hematoma

## 2020-04-29 NOTE — PROGRESS NOTE ADULT - SUBJECTIVE AND OBJECTIVE BOX
Date of service: 04-29-20 @ 12:25    Events noted  Intubated on ventilatory support  Febrile to 102F  Poorly responsive    ROS: unobtainable    MEDICATIONS  (STANDING):  ALBUTerol    90 MICROgram(s) HFA Inhaler 2 Puff(s) Inhalation every 4 hours  aspirin enteric coated 81 milliGRAM(s) Oral daily  budesonide 160 MICROgram(s)/formoterol 4.5 MICROgram(s) Inhaler 2 Puff(s) Inhalation two times a day  chlorhexidine 0.12% Liquid 15 milliLiter(s) Oral Mucosa every 12 hours  ciprofloxacin   IVPB 400 milliGRAM(s) IV Intermittent every 12 hours  clopidogrel Tablet 75 milliGRAM(s) Oral daily  digoxin  Injectable 0.125 milliGRAM(s) IV Push daily  diltiazem    Tablet 60 milliGRAM(s) Oral every 6 hours  diltiazem Infusion 10 mG/Hr (10 mL/Hr) IV Continuous <Continuous>  enoxaparin Injectable 40 milliGRAM(s) SubCutaneous daily  folic acid 1 milliGRAM(s) Oral daily  furosemide   Injectable 40 milliGRAM(s) IV Push daily  gabapentin 400 milliGRAM(s) Oral three times a day  hydrALAZINE 25 milliGRAM(s) Oral every 6 hours  LORazepam   Injectable 2 milliGRAM(s) IV Push every 6 hours  multivitamin/minerals/iron Oral Solution (CENTRUM) 15 milliLiter(s) Oral daily  pantoprazole  Injectable 40 milliGRAM(s) IV Push daily  thiamine 100 milliGRAM(s) Oral daily  tiotropium 18 MICROgram(s) Capsule 1 Capsule(s) Inhalation daily  vancomycin  IVPB 1000 milliGRAM(s) IV Intermittent every 12 hours      Vital Signs Last 24 Hrs  T(C): 37.9 (29 Apr 2020 04:47), Max: 38.9 (28 Apr 2020 20:00)  T(F): 100.2 (29 Apr 2020 04:47), Max: 102 (28 Apr 2020 20:00)  HR: 77 (29 Apr 2020 09:00) (77 - 122)  BP: 104/65 (29 Apr 2020 09:00) (102/59 - 185/95)  BP(mean): 74 (29 Apr 2020 09:00) (64 - 114)  RR: 16 (29 Apr 2020 09:00) (16 - 42)  SpO2: 100% (29 Apr 2020 09:00) (84% - 100%)    Mode: AC/ CMV (Assist Control/ Continuous Mandatory Ventilation), RR (machine): 16, TV (machine): 550, FiO2: 100, PEEP: 5, ITime: 1, MAP: 12, PIP: 33    Physical exam:    Constitutional:  No acute distress  HEENT: NC/AT, EOMI, PERRLA, conjunctivae clear  Neck: supple; thyroid not palpable  Back: no tenderness  Respiratory: respiratory effort normal; b/l rhonchi  Cardiovascular: S1S2 regular, no murmurs  Abdomen: soft, not tender, not distended, positive BS  Genitourinary: no suprapubic tenderness  Lymphatic: no LN palpable  Musculoskeletal: no muscle tenderness, no joint swelling or tenderness  Extremities: no pedal edema  Neurological/ Psychiatric: confused; moving all extremities  Skin: no rashes; no palpable lesions    Labs: reviewed                        18.0   27.13 )-----------( 126      ( 29 Apr 2020 07:28 )             58.3     04-29    149<H>  |  107  |  67<H>  ----------------------------<  181<H>  4.3   |  37<H>  |  1.58<H>    Ca    8.3<L>      29 Apr 2020 07:28  Phos  6.9     04-29  Mg     2.9     04-29      C-Reactive Protein, Serum: 1.14 mg/dL (04-19-20 @ 11:33)  C-Reactive Protein, Serum: 1.12 mg/dL (04-18-20 @ 19:43)  D-Dimer Assay, Quantitative: <150 ng/mL DDU (04-18-20 @ 19:43)  Ferritin, Serum: 151 ng/mL (04-18-20 @ 19:43)                        17.7   14.05 )-----------( 133      ( 28 Apr 2020 06:48 )             54.5     04-28    154<H>  |  114<H>  |  50<H>  ----------------------------<  172<H>  3.8   |  36<H>  |  1.07    Ca    8.6      28 Apr 2020 06:48  Phos  3.2     04-28  Mg     2.8     04-28    COVID-19 PCR . (04.26.20 @ 00:02)    COVID-19 PCR: NotDetec      Culture - Blood (collected 26 Apr 2020 01:01)  Source: .Blood None  Preliminary Report (27 Apr 2020 10:02):    No growth to date.    Culture - Blood (collected 26 Apr 2020 00:56)  Source: .Blood None  Preliminary Report (27 Apr 2020 10:02):    No growth to date.    Culture - Sputum (collected 25 Apr 2020 01:00)  Source: .Sputum Sputum  trap  Gram Stain (26 Apr 2020 12:29):    Moderate polymorphonuclear leukocytes per low power field    No Squamous epithelial cells per low power field    Rare Gram Positive Rods per oil power field    Rare Gram Positive Cocci in Pairs and Chains per oil power field  Final Report (28 Apr 2020 10:53):    Few Pseudomonas aeruginosa (Carbapenem Resistant)    Moderate Methicillin resistant Staphylococcus aureus    Normal Respiratory Faith present  Organism: Pseudomonas aeruginosa (Carbapenem Resistant)  Methicillin resistant Staphylococcus aureus (28 Apr 2020 10:44)  Organism: Pseudomonas aeruginosa (Carbapenem Resistant) (28 Apr 2020 10:44)      -  Amikacin: S <=16      -  Aztreonam: R >16      -  Cefepime: R >16      -  Ceftazidime: R >16      -  Ciprofloxacin: S <=0.25      -  Gentamicin: S 4      -  Imipenem: R >8      -  Levofloxacin: S <=0.5      -  Meropenem: R >8      -  Piperacillin/Tazobactam: R >64      -  Tobramycin: S <=2      Method Type: LADY  Organism: Methicillin resistant Staphylococcus aureus (28 Apr 2020 10:40)      -  Amoxicillin/Clavulanic Acid: R >4/2      -  Ampicillin: R >8      -  Ampicillin/Sulbactam: R <=8/4      -  Cefazolin: R >16      -  Ceftriaxone: R >32      -  Ciprofloxacin: R >2      -  Clindamycin: R >4      -  Daptomycin: S 0.5      -  Erythromycin: R >4      -  Gentamicin: S <=1 Should not be used as monotherapy      -  Levofloxacin: R >4      -  Linezolid: S 4      -  Meropenem: R >8      -  Moxifloxacin(Aerobic): R >4      -  Oxacillin: R >2      -  Penicillin: R >8      -  RIF- Rifampin: S <=1 Should not be used as monotherapy      -  Tetra/Doxy: S 2      -  Trimethoprim/Sulfamethoxazole: S <=0.5/9.5      -  Vancomycin: S 2      Method Type: LADY    Culture - Urine (collected 21 Apr 2020 15:51)  Source: .Urine Clean Catch (Midstream)  Final Report (22 Apr 2020 17:03):    <10,000 CFU/mL Normal Urogenital Faith    Culture - Blood (collected 18 Apr 2020 19:43)  Source: .Blood Blood-Peripheral  Final Report (24 Apr 2020 01:01):    No Growth Final    Radiology: all available radiological tests reviewed    < from: Xray Chest 1 View AP/PA. (04.25.20 @ 10:12) >  Percent of LEFT lung opacification: Clear  Percent of RIGHT lung opacification: Clear  Change in lung opacification from most recent x-ray (<=3 days): Stable  Change from prior dated 3 or more days (same admission): No Prior    < end of copied text >    < from: Xray Chest 1 View-PORTABLE IMMEDIATE (04.29.20 @ 07:36) >  Percent of LEFT lung opacification: %  Percent of RIGHT lung opacification: Clear  Change in lung opacification from most recent x-ray (<=3 days): Increase  Change from prior dated 3or more days (same admission): Increase    < end of copied text >      Advanced directives addressed: full resuscitation

## 2020-04-29 NOTE — PROGRESS NOTE ADULT - PROBLEM SELECTOR PROBLEM 5
Hypertension
Acute on chronic respiratory failure with hypoxia and hypercapnia

## 2020-04-29 NOTE — PROGRESS NOTE ADULT - SUBJECTIVE AND OBJECTIVE BOX
Interval History:  4/29/20: Patient reintubated this AM with respiratory distress and worsening mentation.      MEDICATIONS  (STANDING):  ALBUTerol    90 MICROgram(s) HFA Inhaler 2 Puff(s) Inhalation every 4 hours  aspirin enteric coated 81 milliGRAM(s) Oral daily  budesonide 160 MICROgram(s)/formoterol 4.5 MICROgram(s) Inhaler 2 Puff(s) Inhalation two times a day  chlorhexidine 0.12% Liquid 15 milliLiter(s) Oral Mucosa every 12 hours  ciprofloxacin   IVPB 400 milliGRAM(s) IV Intermittent every 12 hours  clopidogrel Tablet 75 milliGRAM(s) Oral daily  dexMEDEtomidine Infusion 0.1 MICROgram(s)/kG/Hr (2.95 mL/Hr) IV Continuous <Continuous>  digoxin  Injectable 0.125 milliGRAM(s) IV Push daily  diltiazem    Tablet 60 milliGRAM(s) Oral every 6 hours  diltiazem Infusion 10 mG/Hr (10 mL/Hr) IV Continuous <Continuous>  enoxaparin Injectable 40 milliGRAM(s) SubCutaneous daily  folic acid 1 milliGRAM(s) Oral daily  furosemide   Injectable 40 milliGRAM(s) IV Push daily  gabapentin 400 milliGRAM(s) Oral three times a day  hydrALAZINE 25 milliGRAM(s) Oral every 6 hours  LORazepam   Injectable 2 milliGRAM(s) IV Push every 6 hours  multivitamin/minerals/iron Oral Solution (CENTRUM) 15 milliLiter(s) Oral daily  pantoprazole  Injectable 40 milliGRAM(s) IV Push daily  thiamine 100 milliGRAM(s) Oral daily  tiotropium 18 MICROgram(s) Capsule 1 Capsule(s) Inhalation daily  vancomycin  IVPB 1000 milliGRAM(s) IV Intermittent every 12 hours    MEDICATIONS  (PRN):  polyethylene glycol 3350 17 Gram(s) Oral daily PRN Constipation      Allergies    Ceclor (Rash)  Duricef (Rash)    Intolerances        PHYSICAL EXAM:  Vital Signs Last 24 Hrs  T(F): 100.2 (04-29-20 @ 04:47)  HR: 83 (04-29-20 @ 08:00)  BP: 106/69 (04-29-20 @ 08:00)  RR: 16 (04-29-20 @ 08:00)    GENERAL: NAD, well-groomed, well-developed  HEAD:  Atraumatic, Normocephalic  Neuro:  Intubated, on ventilator.  Received some sedation at time of intubation but not currently running.  Does not respond to verbal stimuli  Pupils equally round and reactive to light  Negative oculocephalics  + corneals  Opens eyes to noxious stimuli, but does not withdraw extremities    LABS:                        18.0   27.13 )-----------( 126      ( 29 Apr 2020 07:28 )             58.3     04-29    149<H>  |  107  |  67<H>  ----------------------------<  181<H>  4.3   |  37<H>  |  1.58<H>    Ca    8.3<L>      29 Apr 2020 07:28  Phos  6.9     04-29  Mg     2.9     04-29            RADIOLOGY & ADDITIONAL STUDIES:    CT Head  < from: CT Brain Stroke Protocol (04.22.20 @ 20:29) >  Impression:    No acute hemorrhage, mass effect or extra-axial collections.      < from: CT Angio Neck w/ IV Cont (04.22.20 @ 21:58) >  IMPRESSION:  Patent cervical and intracranial major arterial vasculature without evidence of abrupt vessel cut off, hemodynamically significant stenosis, aneurysm, or dissection.    EEG :   < from: EEG Awake and Asleep (04.23.20 @ 10:30) >  Impression:  This is an mild abnormal EEG due to the presence of  1. Mild generalized background slowing may be on the basis of underlying cerebral dysfunction may be on the basis of underlying metabolic, toxic or structural pathology or due to sedation.  2. Excess beta activity noted related to medication effect. Clinical correlation recommended.  3. No epileptiform activity noted. Clinical correlation recommended.  Repeat study without sedation if clinically indicated.

## 2020-04-29 NOTE — PROGRESS NOTE ADULT - ASSESSMENT
· Assessment		  65 year old male w chronic AFib w Watchman device, CHF, COPD, HTN, alcohol abuse, obesity came to ED w EMS c/o SOB since the morning of 04-18.  Per EMS, pt was in rapid afib in 180s upon EMS arrival with O2 sat in 80 acute respiratory failure with hypercapnia due to COPD exacerbation .    Neurology called after episode of altered mental status and slurred speech on 4/22.    Hospital course complicated by ETOH withdrawal.  Patient reintubated 4/29 with worsening mental status and respiratory distress.    -Earlier in hospital course patient had CT head which was negative for stroke and EEG suggestive of encephalopathy.  -AMS multifactorial including hypoxic encephalopathy and toxic metabolic encephalopathy as well as ETOH withdrawal  -Now being treated for probable pneumonia with MRSA.  -AF with RVR. Has watchman procedure and not on anticoagulation    Will follow as needed.

## 2020-04-29 NOTE — PROGRESS NOTE ADULT - PROBLEM SELECTOR PLAN 6
Patient to present to the  emergency department for admission following discharge from [facility].    Return here immediately for new or emergent concerns.  
Still on triple therapy as receiving asa and plavix and lovenox 40 mg BID. HGB stable.
possible due lovenox injections , stable hemoglobin , while on lovenox ,  ecotrin , plavix , as patient has hx of CVA would continue medication , will hold plavix if there is evidence acute significant drop in hemoglobin
On ASA & plavix & DVT prophylaxis.  Hb stable.

## 2020-04-29 NOTE — PROGRESS NOTE ADULT - SUBJECTIVE AND OBJECTIVE BOX
Patient is a 65y old  Male who presents with a chief complaint of acute hypoxemic, hypercapneic resp failure  COPD exacerbation (28 Apr 2020 18:06)  BRIEF HOSPITAL COURSE: 64 y/o male PMHx  chronic Afib s/p Watchman's device (4/19), HTN, prior CVAs, ETOH abuse, smoker, COPD, HFpEF, Cirrhosis, s/p L BKA admitted on 4/18 with acute COPD exacerbation. Hospital course complicated by ETOH withdrawal, delirium tremens. Pt was RRT due AMS and respiratory distress. CT head negative for any acute pathology. ABG with acute on chronic hypercapnic respiratory failure. Patient was intubated and then extubated on 4/26. Course complicated by AFib w/ RVR on Cardizem infusion, and ongoing encephalopathy        Events last 24 hours: Encephalopathic, lethargic. Afib RVR on Cardizem gtt. Febrile. Hypoxic with escalating FiO2 requirements, on 50% VM with SpO2 90%. IVF d/c and given Zaroxolyn/Lasix. Pt found struggling to breathe on his own and was reintubated. Noted increased white count this am, pt not on any sedation with minimal neurologic responses.     PAST MEDICAL & SURGICAL HISTORY:  Chronic CHF  COPD without exacerbation  Atrial fibrillation  Presence of Watchman left atrial appendage closure device  Hypertension  GERD (gastroesophageal reflux disease)  Chronic obstructive pulmonary disease (COPD)  Anemia  Falls  Meningitis  Collapsed lung  Alcohol withdrawal  Emphysema of lung  Cirrhosis  CHF (congestive heart failure)  Poor historian  Alcohol abuse  Chronic atrial fibrillation  Unilateral amputation of lower extremity below knee  Presence of Watchman left atrial appendage closure device  S/P BKA (below knee amputation) unilateral, left        SOCIAL HISTORY: UATO due to AMS      Review of Systems: Due to altered mental status, subjective information was not able to be obtained from the patient. History was obtained, to the extent possible, from review of the chart and collateral sources of information.      Medications:  ciprofloxacin   IVPB 400 milliGRAM(s) IV Intermittent every 12 hours  vancomycin  IVPB 1000 milliGRAM(s) IV Intermittent every 12 hours  digoxin  Injectable 0.125 milliGRAM(s) IV Push daily  diltiazem    Tablet 60 milliGRAM(s) Oral every 6 hours  diltiazem Infusion 10 mG/Hr IV Continuous <Continuous>  furosemide   Injectable 40 milliGRAM(s) IV Push daily  hydrALAZINE 25 milliGRAM(s) Oral every 6 hours  ALBUTerol    90 MICROgram(s) HFA Inhaler 2 Puff(s) Inhalation every 4 hours  budesonide 160 MICROgram(s)/formoterol 4.5 MICROgram(s) Inhaler 2 Puff(s) Inhalation two times a day  tiotropium 18 MICROgram(s) Capsule 1 Capsule(s) Inhalation daily  gabapentin 400 milliGRAM(s) Oral three times a day  LORazepam   Injectable 2 milliGRAM(s) IV Push every 6 hours  aspirin enteric coated 81 milliGRAM(s) Oral daily  clopidogrel Tablet 75 milliGRAM(s) Oral daily  enoxaparin Injectable 40 milliGRAM(s) SubCutaneous daily  polyethylene glycol 3350 17 Gram(s) Oral daily PRN  methylPREDNISolone sodium succinate Injectable 40 milliGRAM(s) IV Push every 12 hours  folic acid 1 milliGRAM(s) Oral daily  multivitamin/minerals/iron Oral Solution (CENTRUM) 15 milliLiter(s) Oral daily  thiamine 100 milliGRAM(s) Oral daily        ICU Vital Signs Last 24 Hrs  T(C): 38.3 (28 Apr 2020 23:07), Max: 38.9 (28 Apr 2020 20:00)  T(F): 101 (28 Apr 2020 23:07), Max: 102 (28 Apr 2020 20:00)  HR: 102 (29 Apr 2020 00:00) (99 - 122)  BP: 135/63 (29 Apr 2020 00:00) (129/64 - 185/95)  BP(mean): 79 (29 Apr 2020 00:00) (79 - 126)  ABP: --  ABP(mean): --  RR: 34 (29 Apr 2020 00:00) (25 - 42)  SpO2: 91% (29 Apr 2020 00:00) (90% - 99%)          I&O's Detail    27 Apr 2020 07:01  -  28 Apr 2020 07:00  --------------------------------------------------------  IN:    diltiazem Infusion: 420 mL  Total IN: 420 mL    OUT:    Voided: 1300 mL  Total OUT: 1300 mL    Total NET: -880 mL      28 Apr 2020 07:01  -  29 Apr 2020 00:01  --------------------------------------------------------  IN:    IV PiggyBack: 450 mL  Total IN: 450 mL    OUT:    Voided: 1 mL  Total OUT: 1 mL    Total NET: 449 mL            LABS:  Blood Gas Profile - Arterial (04.29.20 @ 08:18)    pH, Arterial: 7.43    pCO2, Arterial: 58 mmHg    pO2, Arterial: 260 mmHg    HCO3, Arterial: 38 mmoL/L    Base Excess, Arterial: 10.6 mmol/L    Oxygen Saturation, Arterial: 99 %    Blood Gas Comments Arterial: peep 5    Blood Gas Source Arterial: Arterial    Basic Metabolic Panel in AM (04.29.20 @ 07:28)    Sodium, Serum: 149 mmol/L    Potassium, Serum: 4.3 mmol/L    Chloride, Serum: 107 mmol/L    Carbon Dioxide, Serum: 37 mmol/L    Anion Gap, Serum: 5 mmol/L    Blood Urea Nitrogen, Serum: 67 mg/dL    Creatinine, Serum: 1.58 mg/dL    Glucose, Serum: 181 mg/dL    Calcium, Total Serum: 8.3 mg/dL                          18.0   27.13 )-----------( 126      ( 29 Apr 2020 07:28 )             58.3                            CULTURES:  Culture Results:   No growth to date. (04-26 @ 01:01)  Culture Results:   No growth to date. (04-26 @ 00:56)  Culture Results:   Few Pseudomonas aeruginosa (Carbapenem Resistant)  Moderate Methicillin resistant Staphylococcus aureus  Normal Respiratory Faith present (04-25 @ 01:00)        Physical Examination:    General: Toxic, encephalopathic    HEENT: Pupils equal, reactive to light.  Symmetric.    PULM: Diffuse ronchi and expiratory wheezes    CVS: AFib 's    ABD: Softly distended, nontender    EXT: Mild generalized anasarca. LLE BKA    SKIN: Warm and well perfused, skin flushed diffusely     NEURO: RASS -3        RADIOLOGY: prior images reviewed       INDWELLING NOVAK:   VTE PROPHYLAXIS: Lovenox   CAM ICU: +   CODE STATUS: FULL      Assessment and Plan:   · Assessment		  64 y/o male PMHx  chronic Afib s/p Watchman's device (4/19), HTN, prior CVAs, ETOH abuse, smoker, COPD, HFpEF, Cirrhosis, s/p L BKA admitted on 4/18 with acute COPD exacerbation, with hospital course complicated by ETOH withdrawal, delirium tremens, acute on chronic hypercapnic respiratory failure necessitating emergent intubation, AFib with RVR. Now with severe sepsis secondary to MRSA and CRE Pseudomonas pneumonia.      Neuro: Toxic metabolic encephalopathy, Pt with minimal neurologic responses despite no sedation, pt to have CT head    Pulm:  D/C IV fluids, diuresing with Zaroxolyn and Lasix. Intubated last night 4/28/20, AC mode mech vent. ABG post intubation with good PaO2- FIO2 decreased to 60%, pt tolerating well.     CV: Afib RVR in the setting of ongoing sepsis, fevers. On Cardizem gtt @ 20 mg/hr, PO Cardizem, and Digoxin. Cardizem PO increased to 60mg q6h. Supplementing lytes to K >4, Mg >2 for optimal arrhythmia suppression. HTN better controlled with addition of Hydralazine. Increasing PO Cardizem to optimize rate control, will continue to monitor BP and increase Hydralazine as needed- Cardio following    GI: Start Tube Feeds via NGT in lieu of IV fluids.      Renal: Monitoring renal indices. Free water added for hyperchloremic hypernatremia.    ID: Severe sepsis MRSA and CRE Pseudomonas PNA, on Vancomycin and Cipro, respectively. Patient has PCN allergy- Called [facility] to check for sensitivity to Colistin as alternate agent. F/u sensitivity testing. Blood cultures thus far negative to date. Escalating white count, repeat blood cultures    Heme: Lovenox SC for DVT ppx, change to once daily. Continue Aspirin and Plavix, pt s/p watchman procedure

## 2020-04-29 NOTE — PROGRESS NOTE ADULT - SUBJECTIVE AND OBJECTIVE BOX
HPI: 64 y/o male PMHx  chronic Afib s/p Watchman's device (4/19), HTN, prior CVAs, ETOH abuse, smoker, COPD, HFpEF, Cirrhosis, s/p L BKA admitted on 4/18 with acute COPD exacerbation, with hospital course complicated by ETOH withdrawal, delirium tremens, acute on chronic hypercapnic respiratory failure necessitating emergent intubation, AFib with RVR. Now with severe sepsis secondary to MRSA and CRE Pseudomonas pneumonia.    EVENTS:  Re-evaluated patient closely throughout the night  Was trialed on BiPAP developed respiratory distress and was promptly removed by RN, placed back on 50% VM  Oxygenation had remained stable 88-91% on 50% VM  This AM his mentation further declined, now obtunded with agonal respirations  ABG obtained, however was insufficient specimen  SpO2 85% on 50% VM, started on 100% NRB with improvement to 94%    Patient was noted to have prior tracheostomy, chart reviewed- trached in 2017 at [facility] due to acute COPD exacerbation / delirium tremens had been intubated 3x on admission, with failure to wean from mechanical ventilation. Not known to be a difficult intubation since, and was easily reintubated this admission.    At this point, he does not have adequate mentation to protect his airway on BiPAP and is a high aspiration risk  Patient was emergently intubated by myself for acute on chronic hypercapnic hypoxemic respiratory failure with altered mental status likely CO2 narcosis via Glidescope on first pass attempt        Additional 25 minutes of critical care time, non-inclusive of time spent on procedures performed.

## 2020-04-29 NOTE — PROGRESS NOTE ADULT - SUBJECTIVE AND OBJECTIVE BOX
REASON FOR VISIT: AF, Rx management    HPI:  65 year old man with a history of chronic AF, Watchman device (implanted 2019), HTN, CVAs, alcoholism, tobacco abuse, COPD, HFpEF (60% 2018), GERD, cirrhosis, left BKA following traumatic injury admitted on 2020 with  acute on chronic hypercapnic & hypoxemic respiratory failure due to suspected exacerbation of COPD and/or bronchitis.    20 Patient complain of wheezing ,no sob at rest , on 4 L NC O2  despite of being on IV lasix ,  heart rate is 80 to 100s    2020:  "I'm still wheezing."  No new complaints.  2020:  Overnight events noted and case discussed with Dr Crews; sedated on vent in CICU.  20  Patient is remain intubated , his heart rate is controlled , rectus sheath hematoma without significant drop in hemoglobin level ,   20: no real changes from yesterday and remains intubated and sedated.   20: Mental status not improved and now extubated.  In rapid atrial fib AM.  Cannot take po due to mental status.    20: Mental status still not improved enough to take medication oral and despite maximum cardizem   20 Patient is lethargic arousble confused , hypertensive , patient had low grade fever did  recieve IV tylenol , patient heart rate is better after starting him on digoxin , and on maximum cardizem drip patient hypernatremic , poor oral intake    20: Hypoxic this AM w/ agonal respirations, emergently intubated.    MEDICATIONS:    MEDICATIONS  (STANDING):  ALBUTerol    90 MICROgram(s) HFA Inhaler 2 Puff(s) Inhalation every 4 hours  budesonide 160 MICROgram(s)/formoterol 4.5 MICROgram(s) Inhaler 2 Puff(s) Inhalation two times a day  chlorhexidine 0.12% Liquid 15 milliLiter(s) Oral Mucosa every 12 hours  ciprofloxacin   IVPB 400 milliGRAM(s) IV Intermittent every 12 hours  clopidogrel Tablet 75 milliGRAM(s) Oral daily  dexMEDEtomidine Infusion 0.5 MICROgram(s)/kG/Hr (14.7 mL/Hr) IV Continuous <Continuous>  digoxin  Injectable 0.125 milliGRAM(s) IV Push daily  diltiazem    Tablet 60 milliGRAM(s) Oral every 6 hours  diltiazem Infusion 10 mG/Hr (10 mL/Hr) IV Continuous <Continuous>  folic acid 1 milliGRAM(s) Oral daily  furosemide   Injectable 40 milliGRAM(s) IV Push daily  gabapentin 400 milliGRAM(s) Oral three times a day  hydrALAZINE 25 milliGRAM(s) Oral every 6 hours  lactated ringers Bolus 500 milliLiter(s) IV Bolus once  LORazepam   Injectable 2 milliGRAM(s) IV Push every 6 hours  multivitamin/minerals/iron Oral Solution (CENTRUM) 15 milliLiter(s) Oral daily  pantoprazole  Injectable 40 milliGRAM(s) IV Push daily  thiamine 100 milliGRAM(s) Oral daily  tiotropium 18 MICROgram(s) Capsule 1 Capsule(s) Inhalation daily  vancomycin  IVPB 1000 milliGRAM(s) IV Intermittent every 12 hours    MEDICATIONS  (PRN):  polyethylene glycol 3350 17 Gram(s) Oral daily PRN Constipation      REVIEW OF SYSTEMS:    CONSTITUTIONAL: No weakness, fevers or chills  EYES/ENT: No visual changes;  No vertigo or throat pain   NECK: No pain or stiffness  RESPIRATORY: No cough, wheezing, hemoptysis; No shortness of breath  CARDIOVASCULAR: No chest pain or palpitations  GASTROINTESTINAL: No abdominal or epigastric pain. No nausea, vomiting, or hematemesis; No diarrhea or constipation. No melena or hematochezia.  GENITOURINARY: No dysuria, frequency or hematuria  NEUROLOGICAL: No numbness or weakness  SKIN: No itching, burning, rashes, or lesions   All other review of systems is negative unless indicated above    Vital Signs Last 24 Hrs  T(C): 38.4 (2020 15:05), Max: 38.9 (2020 20:00)  T(F): 101.2 (2020 15:05), Max: 102 (2020 20:00)  HR: 68 (2020 15:00) (68 - 117)  BP: 76/46 (2020 15:00) (76/46 - 165/85)  BP(mean): 52 (2020 15:00) (52 - 101)  RR: 16 (2020 15:00) (16 - 42)  SpO2: 95% (2020 15:00) (84% - 100%)Daily     Daily Weight in k.9 (2020 04:47)I&O's Summary    2020 07:01  -  2020 07:00  --------------------------------------------------------  IN: 840 mL / OUT: 1750 mL / NET: -910 mL        PHYSICAL EXAM:    Constitutional: NAD, awake and alert, well-developed  HEENT: PERR, EOMI,  No oral cyananosis.  Neck:  supple,  No JVD  Respiratory: Breath sounds are clear bilaterally, No wheezing, rales or rhonchi  Cardiovascular: S1 and S2, regular rate and rhythm, no Murmurs, gallops or rubs  Gastrointestinal: Bowel Sounds present, soft, nontender.   Extremities: No peripheral edema. No clubbing or cyanosis.  Vascular: 2+ peripheral pulses  Neurological: A/O x 3, no focal deficits  Musculoskeletal: no calf tenderness.  Skin: No rashes.      LABS: All Labs Reviewed:                        18.0   27.13 )-----------( 126      ( 2020 07:28 )             58.3                         17.7   14.05 )-----------( 133      ( 2020 06:48 )             54.5                         17.2   12.86 )-----------( 131      ( 2020 06:53 )             52.5     2020 07:28    149    |  107    |  67     ----------------------------<  181    4.3     |  37     |  1.58   2020 06:48    154    |  114    |  50     ----------------------------<  172    3.8     |  36     |  1.07   2020 06:53    152    |  111    |  48     ----------------------------<  152    3.3     |  35     |  1.06     Ca    8.3        2020 07:28  Ca    8.6        2020 06:48  Ca    8.4        2020 06:53  Phos  6.9       2020 07:28  Phos  3.2       2020 06:48  Phos  3.2       2020 06:53  Mg     2.9       2020 07:28  Mg     2.8       2020 06:48  Mg     2.8       2020 06:53            Blood Culture: Organism --  Gram Stain Blood -- Gram Stain --  Specimen Source .Blood None  Culture-Blood --    Organism --  Gram Stain Blood -- Gram Stain --  Specimen Source .Blood None  Culture-Blood --    Organism Pseudomonas aeruginosa (Carbapenem Resistant)  Gram Stain Blood -- Gram Stain   Moderate polymorphonuclear leukocytes per low power field  No Squamous epithelial cells per low power field  Rare Gram Positive Rods per oil power field  Rare Gram Positive Cocci in Pairs and Chains per oil power field  Specimen Source .Sputum Sputum  trap  Culture-Blood --            RADIOLOGY/EKG:      ECHO/CARDIAC CATHTERIZATION/STRESS TEST: REASON FOR VISIT: AF, Rx management    HPI:  65 year old man with a history of chronic AF, Watchman device (implanted 2019), HTN, CVAs, alcoholism, tobacco abuse, COPD, HFpEF (60% 2018), GERD, cirrhosis, left BKA following traumatic injury admitted on 2020 with  acute on chronic hypercapnic & hypoxemic respiratory failure due to suspected exacerbation of COPD and/or bronchitis.    20 Patient complain of wheezing ,no sob at rest , on 4 L NC O2  despite of being on IV lasix ,  heart rate is 80 to 100s    2020:  "I'm still wheezing."  No new complaints.  2020:  Overnight events noted and case discussed with Dr Crews; sedated on vent in CICU.  20  Patient is remain intubated , his heart rate is controlled , rectus sheath hematoma without significant drop in hemoglobin level ,   20: no real changes from yesterday and remains intubated and sedated.   20: Mental status not improved and now extubated.  In rapid atrial fib AM.  Cannot take po due to mental status.    20: Mental status still not improved enough to take medication oral and despite maximum cardizem   20 Patient is lethargic arousble confused , hypertensive , patient had low grade fever did  recieve IV tylenol , patient heart rate is better after starting him on digoxin , and on maximum cardizem drip patient hypernatremic , poor oral intake    20: Hypoxic this AM w/ agonal respirations, emergently intubated.  Currently sedated.    MEDICATIONS:    MEDICATIONS  (STANDING):  ALBUTerol    90 MICROgram(s) HFA Inhaler 2 Puff(s) Inhalation every 4 hours  budesonide 160 MICROgram(s)/formoterol 4.5 MICROgram(s) Inhaler 2 Puff(s) Inhalation two times a day  chlorhexidine 0.12% Liquid 15 milliLiter(s) Oral Mucosa every 12 hours  ciprofloxacin   IVPB 400 milliGRAM(s) IV Intermittent every 12 hours  clopidogrel Tablet 75 milliGRAM(s) Oral daily  dexMEDEtomidine Infusion 0.5 MICROgram(s)/kG/Hr (14.7 mL/Hr) IV Continuous <Continuous>  digoxin  Injectable 0.125 milliGRAM(s) IV Push daily  diltiazem    Tablet 60 milliGRAM(s) Oral every 6 hours  diltiazem Infusion 10 mG/Hr (10 mL/Hr) IV Continuous <Continuous>  folic acid 1 milliGRAM(s) Oral daily  furosemide   Injectable 40 milliGRAM(s) IV Push daily  gabapentin 400 milliGRAM(s) Oral three times a day  hydrALAZINE 25 milliGRAM(s) Oral every 6 hours  lactated ringers Bolus 500 milliLiter(s) IV Bolus once  LORazepam   Injectable 2 milliGRAM(s) IV Push every 6 hours  multivitamin/minerals/iron Oral Solution (CENTRUM) 15 milliLiter(s) Oral daily  pantoprazole  Injectable 40 milliGRAM(s) IV Push daily  thiamine 100 milliGRAM(s) Oral daily  tiotropium 18 MICROgram(s) Capsule 1 Capsule(s) Inhalation daily  vancomycin  IVPB 1000 milliGRAM(s) IV Intermittent every 12 hours    MEDICATIONS  (PRN):  polyethylene glycol 3350 17 Gram(s) Oral daily PRN Constipation      Vital Signs Last 24 Hrs  T(C): 38.4 (2020 15:05), Max: 38.9 (2020 20:00)  T(F): 101.2 (2020 15:05), Max: 102 (2020 20:00)  HR: 68 (2020 15:00) (68 - 117)  BP: 76/46 (2020 15:00) (76/46 - 165/85)  BP(mean): 52 (2020 15:00) (52 - 101)  RR: 16 (2020 15:00) (16 - 42)  SpO2: 95% (2020 15:00) (84% - 100%)Daily     Daily Weight in k.9 (2020 04:47)I&O's Summary    2020 07:01  -  2020 07:00  --------------------------------------------------------  IN: 840 mL / OUT: 1750 mL / NET: -910 mL        PHYSICAL EXAM:    Constitutional: intubated, sedated.  HEENT: PERR, EOMI,   Neck:  supple,  No JVD  Respiratory: Breath sounds are clear bilaterally, No wheezing, rales or rhonchi  Cardiovascular: S1 and S2, regular rate and rhythm, no Murmurs, gallops or rubs  Gastrointestinal: Bowel Sounds present, soft, nontender.   Extremities: No peripheral edema. No clubbing or cyanosis.  Vascular: 2+ peripheral pulses      LABS: All Labs Reviewed:                        18.0   27.13 )-----------( 126      ( 2020 07:28 )             58.3                         17.7   14.05 )-----------( 133      ( 2020 06:48 )             54.5                         17.2   12.86 )-----------( 131      ( 2020 06:53 )             52.5     2020 07:28    149    |  107    |  67     ----------------------------<  181    4.3     |  37     |  1.58   2020 06:48    154    |  114    |  50     ----------------------------<  172    3.8     |  36     |  1.07   2020 06:53    152    |  111    |  48     ----------------------------<  152    3.3     |  35     |  1.06     Ca    8.3        2020 07:28  Ca    8.6        2020 06:48  Ca    8.4        2020 06:53  Phos  6.9       2020 07:28  Phos  3.2       2020 06:48  Phos  3.2       2020 06:53  Mg     2.9       2020 07:28  Mg     2.8       2020 06:48  Mg     2.8       2020 06:53            Blood Culture: Organism --  Gram Stain Blood -- Gram Stain --  Specimen Source .Blood None  Culture-Blood --    Organism --  Gram Stain Blood -- Gram Stain --  Specimen Source .Blood None  Culture-Blood --    Organism Pseudomonas aeruginosa (Carbapenem Resistant)  Gram Stain Blood -- Gram Stain   Moderate polymorphonuclear leukocytes per low power field  No Squamous epithelial cells per low power field  Rare Gram Positive Rods per oil power field  Rare Gram Positive Cocci in Pairs and Chains per oil power field  Specimen Source .Sputum Sputum  trap  Culture-Blood --      Transthoracic Echocardiogram (06.10.19 @ 20:49):   Estimated left ventricular ejection fraction is 60-65 %. The left ventricle is normal in wall thickness, wall motion and contractility as seen in limited views.   The left ventricle cavity is moderately dilated.   The left atrium is severely dilated.   The right atrium appears severely dilated.   Normal appearing right ventricle structure and function.   Moderate (2+) mitral regurgitation.   Mild to Moderate Tricuspid regurgitation.     US Duplex Venous Lower Ext Complete, Bilateral (20 @ 16:17):  No evidence of deep venous thrombosis in either lower extremity.    Tele: AF with mild RVR     CT Brain Stroke Protocol (20 @ 20:29):  No acute hemorrhage, mass effect or extra-axial collections.    CT Abdomen and Pelvis w/wo IV Cont (20 @ 09:09) >  1.  3 mm nonobstructing stone in the left ureterovesicular junction.  2.  7 mm nonobstructing stone in the lower pole right kidney.  3.  Subacute hemorrhage in the right rectus abdominis along the anterior sheath, measures 1.6 cm in thickness and extends from the umbilicus to the inferior myotendinous junction.  4.  Cirrhosis. Mild splenomegaly is new from the prior and suggestive of portal venous hypertension. REASON FOR VISIT: AF, Rx management    HPI:  65 year old man with a history of chronic AF, Watchman device (implanted 2019), HTN, CVAs, alcoholism, tobacco abuse, COPD, HFpEF (60% 2018), GERD, cirrhosis, left BKA following traumatic injury admitted on 2020 with  acute on chronic hypercapnic & hypoxemic respiratory failure due to suspected exacerbation of COPD and/or bronchitis.    20 Patient complain of wheezing ,no sob at rest , on 4 L NC O2  despite of being on IV lasix ,  heart rate is 80 to 100s    2020:  "I'm still wheezing."  No new complaints.  2020:  Overnight events noted and case discussed with Dr Crews; sedated on vent in CICU.  20  Patient is remain intubated , his heart rate is controlled , rectus sheath hematoma without significant drop in hemoglobin level ,   20: no real changes from yesterday and remains intubated and sedated.   20: Mental status not improved and now extubated.  In rapid atrial fib AM.  Cannot take po due to mental status.    20: Mental status still not improved enough to take medication oral and despite maximum cardizem   20 Patient is lethargic arousble confused , hypertensive , patient had low grade fever did  recieve IV tylenol , patient heart rate is better after starting him on digoxin , and on maximum cardizem drip patient hypernatremic , poor oral intake    20: Hypoxic this AM w/ agonal respirations, emergently intubated.  Currently sedated.    MEDICATIONS:    MEDICATIONS  (STANDING):  ALBUTerol    90 MICROgram(s) HFA Inhaler 2 Puff(s) Inhalation every 4 hours  budesonide 160 MICROgram(s)/formoterol 4.5 MICROgram(s) Inhaler 2 Puff(s) Inhalation two times a day  chlorhexidine 0.12% Liquid 15 milliLiter(s) Oral Mucosa every 12 hours  ciprofloxacin   IVPB 400 milliGRAM(s) IV Intermittent every 12 hours  clopidogrel Tablet 75 milliGRAM(s) Oral daily  dexMEDEtomidine Infusion 0.5 MICROgram(s)/kG/Hr (14.7 mL/Hr) IV Continuous <Continuous>  digoxin  Injectable 0.125 milliGRAM(s) IV Push daily  diltiazem    Tablet 60 milliGRAM(s) Oral every 6 hours  diltiazem Infusion 10 mG/Hr (10 mL/Hr) IV Continuous <Continuous>  folic acid 1 milliGRAM(s) Oral daily  furosemide   Injectable 40 milliGRAM(s) IV Push daily  gabapentin 400 milliGRAM(s) Oral three times a day  hydrALAZINE 25 milliGRAM(s) Oral every 6 hours  lactated ringers Bolus 500 milliLiter(s) IV Bolus once  LORazepam   Injectable 2 milliGRAM(s) IV Push every 6 hours  multivitamin/minerals/iron Oral Solution (CENTRUM) 15 milliLiter(s) Oral daily  pantoprazole  Injectable 40 milliGRAM(s) IV Push daily  thiamine 100 milliGRAM(s) Oral daily  tiotropium 18 MICROgram(s) Capsule 1 Capsule(s) Inhalation daily  vancomycin  IVPB 1000 milliGRAM(s) IV Intermittent every 12 hours    MEDICATIONS  (PRN):  polyethylene glycol 3350 17 Gram(s) Oral daily PRN Constipation      Vital Signs Last 24 Hrs  T(C): 38.4 (2020 15:05), Max: 38.9 (2020 20:00)  T(F): 101.2 (2020 15:05), Max: 102 (2020 20:00)  HR: 68 (2020 15:00) (68 - 117)  BP: 76/46 (2020 15:00) (76/46 - 165/85)  BP(mean): 52 (2020 15:00) (52 - 101)  RR: 16 (2020 15:00) (16 - 42)  SpO2: 95% (2020 15:00) (84% - 100%)Daily     Daily Weight in k.9 (2020 04:47)I&O's Summary    2020 07:01  -  2020 07:00  --------------------------------------------------------  IN: 840 mL / OUT: 1750 mL / NET: -910 mL        PHYSICAL EXAM:    Constitutional: intubated, sedated.  HEENT: PERR, EOMI,   Neck:  supple,  No JVD  Respiratory: coarse BS bilat., No wheezing, rales or rhonchi  Cardiovascular: S1 and S2, regular rate and rhythm, no Murmurs, gallops or rubs  Gastrointestinal: Bowel Sounds present, soft, nontender.   Extremities: No peripheral edema. No clubbing or cyanosis.  Vascular: 2+ peripheral pulses      LABS: All Labs Reviewed:                        18.0   27.13 )-----------( 126      ( 2020 07:28 )             58.3                         17.7   14.05 )-----------( 133      ( 2020 06:48 )             54.5                         17.2   12.86 )-----------( 131      ( 2020 06:53 )             52.5     2020 07:28    149    |  107    |  67     ----------------------------<  181    4.3     |  37     |  1.58   2020 06:48    154    |  114    |  50     ----------------------------<  172    3.8     |  36     |  1.07   2020 06:53    152    |  111    |  48     ----------------------------<  152    3.3     |  35     |  1.06     Ca    8.3        2020 07:28  Ca    8.6        2020 06:48  Ca    8.4        2020 06:53  Phos  6.9       2020 07:28  Phos  3.2       2020 06:48  Phos  3.2       2020 06:53  Mg     2.9       2020 07:28  Mg     2.8       2020 06:48  Mg     2.8       2020 06:53            Blood Culture: Organism --  Gram Stain Blood -- Gram Stain --  Specimen Source .Blood None  Culture-Blood --    Organism --  Gram Stain Blood -- Gram Stain --  Specimen Source .Blood None  Culture-Blood --    Organism Pseudomonas aeruginosa (Carbapenem Resistant)  Gram Stain Blood -- Gram Stain   Moderate polymorphonuclear leukocytes per low power field  No Squamous epithelial cells per low power field  Rare Gram Positive Rods per oil power field  Rare Gram Positive Cocci in Pairs and Chains per oil power field  Specimen Source .Sputum Sputum  trap  Culture-Blood --      Transthoracic Echocardiogram (06.10.19 @ 20:49):   Estimated left ventricular ejection fraction is 60-65 %. The left ventricle is normal in wall thickness, wall motion and contractility as seen in limited views.   The left ventricle cavity is moderately dilated.   The left atrium is severely dilated.   The right atrium appears severely dilated.   Normal appearing right ventricle structure and function.   Moderate (2+) mitral regurgitation.   Mild to Moderate Tricuspid regurgitation.     US Duplex Venous Lower Ext Complete, Bilateral (20 @ 16:17):  No evidence of deep venous thrombosis in either lower extremity.    Tele: AF with mild RVR     CT Brain Stroke Protocol (20 @ 20:29):  No acute hemorrhage, mass effect or extra-axial collections.    CT Abdomen and Pelvis w/wo IV Cont (20 @ 09:09) >  1.  3 mm nonobstructing stone in the left ureterovesicular junction.  2.  7 mm nonobstructing stone in the lower pole right kidney.  3.  Subacute hemorrhage in the right rectus abdominis along the anterior sheath, measures 1.6 cm in thickness and extends from the umbilicus to the inferior myotendinous junction.  4.  Cirrhosis. Mild splenomegaly is new from the prior and suggestive of portal venous hypertension.

## 2020-04-30 DIAGNOSIS — J96.91 RESPIRATORY FAILURE, UNSPECIFIED WITH HYPOXIA: ICD-10-CM

## 2020-04-30 LAB
-  COLOSTIN: SIGNIFICANT CHANGE UP
ANION GAP SERPL CALC-SCNC: 4 MMOL/L — LOW (ref 5–17)
BASE EXCESS BLDA CALC-SCNC: 12.7 MMOL/L — HIGH (ref -2–2)
BLOOD GAS COMMENTS ARTERIAL: SIGNIFICANT CHANGE UP
BUN SERPL-MCNC: 61 MG/DL — HIGH (ref 7–23)
CALCIUM SERPL-MCNC: 7.8 MG/DL — LOW (ref 8.5–10.1)
CHLORIDE SERPL-SCNC: 104 MMOL/L — SIGNIFICANT CHANGE UP (ref 96–108)
CO2 SERPL-SCNC: 38 MMOL/L — HIGH (ref 22–31)
CREAT SERPL-MCNC: 1.22 MG/DL — SIGNIFICANT CHANGE UP (ref 0.5–1.3)
GLUCOSE SERPL-MCNC: 205 MG/DL — HIGH (ref 70–99)
HCO3 BLDA-SCNC: 39 MMOL/L — HIGH (ref 21–29)
HCT VFR BLD CALC: 51.6 % — HIGH (ref 39–50)
HGB BLD-MCNC: 16.3 G/DL — SIGNIFICANT CHANGE UP (ref 13–17)
MAGNESIUM SERPL-MCNC: 2.8 MG/DL — HIGH (ref 1.6–2.6)
MCHC RBC-ENTMCNC: 31.6 GM/DL — LOW (ref 32–36)
MCHC RBC-ENTMCNC: 31.8 PG — SIGNIFICANT CHANGE UP (ref 27–34)
MCV RBC AUTO: 100.8 FL — HIGH (ref 80–100)
METHOD TYPE: SIGNIFICANT CHANGE UP
ORGANISM # SPEC MICROSCOPIC CNT: SIGNIFICANT CHANGE UP
PCO2 BLDA: 53 MMHG — HIGH (ref 32–46)
PH BLDA: 7.48 — HIGH (ref 7.35–7.45)
PHOSPHATE SERPL-MCNC: 2.8 MG/DL — SIGNIFICANT CHANGE UP (ref 2.5–4.5)
PLATELET # BLD AUTO: 76 K/UL — LOW (ref 150–400)
PO2 BLDA: 62 MMHG — LOW (ref 74–108)
POTASSIUM SERPL-MCNC: 3.1 MMOL/L — LOW (ref 3.5–5.3)
POTASSIUM SERPL-SCNC: 3.1 MMOL/L — LOW (ref 3.5–5.3)
RBC # BLD: 5.12 M/UL — SIGNIFICANT CHANGE UP (ref 4.2–5.8)
RBC # FLD: 12.9 % — SIGNIFICANT CHANGE UP (ref 10.3–14.5)
SAO2 % BLDA: 91 % — LOW (ref 92–96)
SODIUM SERPL-SCNC: 146 MMOL/L — HIGH (ref 135–145)
VANCOMYCIN TROUGH SERPL-MCNC: 11.4 UG/ML — SIGNIFICANT CHANGE UP (ref 10–20)
VANCOMYCIN TROUGH SERPL-MCNC: 39.2 UG/ML — CRITICAL HIGH (ref 10–20)
WBC # BLD: 17.93 K/UL — HIGH (ref 3.8–10.5)
WBC # FLD AUTO: 17.93 K/UL — HIGH (ref 3.8–10.5)

## 2020-04-30 PROCEDURE — 99232 SBSQ HOSP IP/OBS MODERATE 35: CPT

## 2020-04-30 PROCEDURE — 99233 SBSQ HOSP IP/OBS HIGH 50: CPT

## 2020-04-30 RX ORDER — VANCOMYCIN HCL 1 G
1250 VIAL (EA) INTRAVENOUS EVERY 12 HOURS
Refills: 0 | Status: DISCONTINUED | OUTPATIENT
Start: 2020-04-30 | End: 2020-05-10

## 2020-04-30 RX ORDER — POTASSIUM CHLORIDE 20 MEQ
10 PACKET (EA) ORAL
Refills: 0 | Status: COMPLETED | OUTPATIENT
Start: 2020-04-30 | End: 2020-04-30

## 2020-04-30 RX ADMIN — Medication 60 MILLIGRAM(S): at 23:50

## 2020-04-30 RX ADMIN — GABAPENTIN 400 MILLIGRAM(S): 400 CAPSULE ORAL at 00:01

## 2020-04-30 RX ADMIN — Medication 100 MILLIEQUIVALENT(S): at 10:57

## 2020-04-30 RX ADMIN — CLOPIDOGREL BISULFATE 75 MILLIGRAM(S): 75 TABLET, FILM COATED ORAL at 12:24

## 2020-04-30 RX ADMIN — Medication 100 MILLIEQUIVALENT(S): at 12:25

## 2020-04-30 RX ADMIN — GABAPENTIN 400 MILLIGRAM(S): 400 CAPSULE ORAL at 05:18

## 2020-04-30 RX ADMIN — PANTOPRAZOLE SODIUM 40 MILLIGRAM(S): 20 TABLET, DELAYED RELEASE ORAL at 12:24

## 2020-04-30 RX ADMIN — Medication 15 MILLILITER(S): at 12:25

## 2020-04-30 RX ADMIN — Medication 60 MILLIGRAM(S): at 00:01

## 2020-04-30 RX ADMIN — CHLORHEXIDINE GLUCONATE 15 MILLILITER(S): 213 SOLUTION TOPICAL at 17:55

## 2020-04-30 RX ADMIN — CHLORHEXIDINE GLUCONATE 15 MILLILITER(S): 213 SOLUTION TOPICAL at 05:48

## 2020-04-30 RX ADMIN — Medication 11.1 MICROGRAM(S)/KG/MIN: at 00:02

## 2020-04-30 RX ADMIN — Medication 166.67 MILLIGRAM(S): at 19:40

## 2020-04-30 RX ADMIN — Medication 0.12 MILLIGRAM(S): at 12:24

## 2020-04-30 RX ADMIN — Medication 60 MILLIGRAM(S): at 12:23

## 2020-04-30 RX ADMIN — Medication 200 MILLIGRAM(S): at 05:18

## 2020-04-30 RX ADMIN — Medication 2 MILLIGRAM(S): at 12:24

## 2020-04-30 RX ADMIN — Medication 60 MILLIGRAM(S): at 17:55

## 2020-04-30 RX ADMIN — HEPARIN SODIUM 5000 UNIT(S): 5000 INJECTION INTRAVENOUS; SUBCUTANEOUS at 21:52

## 2020-04-30 RX ADMIN — Medication 1 MILLIGRAM(S): at 12:23

## 2020-04-30 RX ADMIN — Medication 100 MILLIGRAM(S): at 12:23

## 2020-04-30 RX ADMIN — GABAPENTIN 400 MILLIGRAM(S): 400 CAPSULE ORAL at 21:52

## 2020-04-30 RX ADMIN — HEPARIN SODIUM 5000 UNIT(S): 5000 INJECTION INTRAVENOUS; SUBCUTANEOUS at 13:26

## 2020-04-30 RX ADMIN — Medication 60 MILLIGRAM(S): at 05:18

## 2020-04-30 RX ADMIN — PROPOFOL 28.3 MICROGRAM(S)/KG/MIN: 10 INJECTION, EMULSION INTRAVENOUS at 05:17

## 2020-04-30 RX ADMIN — Medication 250 MILLIGRAM(S): at 05:18

## 2020-04-30 RX ADMIN — PROPOFOL 28.3 MICROGRAM(S)/KG/MIN: 10 INJECTION, EMULSION INTRAVENOUS at 00:02

## 2020-04-30 RX ADMIN — Medication 200 MILLIGRAM(S): at 17:55

## 2020-04-30 RX ADMIN — HEPARIN SODIUM 5000 UNIT(S): 5000 INJECTION INTRAVENOUS; SUBCUTANEOUS at 05:17

## 2020-04-30 RX ADMIN — Medication 20 MILLIGRAM(S): at 05:48

## 2020-04-30 RX ADMIN — Medication 100 MILLIEQUIVALENT(S): at 09:20

## 2020-04-30 RX ADMIN — Medication 81 MILLIGRAM(S): at 12:24

## 2020-04-30 NOTE — PROGRESS NOTE ADULT - ASSESSMENT
PROBLEMS;    Ac on chronic hypercapnic & hypoxamic respiratory failure-s/p extubation-worsening ventilation  sputum-Few Pseudomonas aeruginosa (Carbapenem Resistant)/Moderate Methicillin resistant Staphylococcus aureus  afib with RVR  [facility]  AC flare of COPD/chronic vs acute on chronic bronchitis  Mild interstitial lung disease-NSIP h/o smoking  COVID negativex2  Pulmonary hypertension  Nonobstructing stone in the left ureterovesicular junction/ nonobstructing stone in the lower pole right kidney.  Subacute hemorrhage in the right rectus abdominis along the anterior sheath, measures 1.6 cm in thickness and extends from the umbilicus to the inferior myotendinous junction  Cirrhosis  Mild splenomegaly-portal venous hypertension.  Chronic afib w Watchman device  CHF  BPH  GERD  ETOH abuse  seizures disorder    PLAN;    vent support-weaning as tolerated  po prednisone  off iv cardizem   off pressors  iv vanco/iv [id number]  aerosols  supportive care  covid repeat testing-neg  d/w staff

## 2020-04-30 NOTE — PROGRESS NOTE ADULT - SUBJECTIVE AND OBJECTIVE BOX
REASON FOR VISIT: AF, Rx management    HPI:  65 year old man with a history of chronic AF, Watchman device (implanted 4/2019), HTN, CVAs, alcoholism, tobacco abuse, COPD, HFpEF (60% 12/2018), GERD, cirrhosis, left BKA following traumatic injury admitted on 4/18/2020 with  acute on chronic hypercapnic & hypoxemic respiratory failure due to suspected exacerbation of COPD and/or bronchitis.  His hospital course has been complicated by encephalopathy and recurrent hypoxia / respiratory failure.    4/21/20 Patient complain of wheezing ,no sob at rest , on 4 L NC O2  despite of being on IV lasix ,  heart rate is 80 to 100s    4/22/2020:  "I'm still wheezing."  No new complaints.  4/23/2020:  Overnight events noted and case discussed with Dr Crews; sedated on vent in CICU.  4/24/20  Patient is remain intubated , his heart rate is controlled , rectus sheath hematoma without significant drop in hemoglobin level ,   4/25/20: no real changes from yesterday and remains intubated and sedated.   4/26/20: Mental status not improved and now extubated.  In rapid atrial fib AM.  Cannot take po due to mental status.    4/27/20: Mental status still not improved enough to take medication oral and despite maximum cardizem   4/28/20 Patient is lethargic arousble confused , hypertensive , patient had low grade fever did  recieve IV tylenol , patient heart rate is better after starting him on digoxin , and on maximum cardizem drip patient hypernatremic , poor oral intake    4/29/20: Hypoxic this AM w/ agonal respirations, emergently intubated.  4/30/20:  Sedated on ventilator.    MEDICATIONS  (STANDING):  ALBUTerol    90 MICROgram(s) HFA Inhaler 2 Puff(s) Inhalation every 4 hours  aspirin  chewable 81 milliGRAM(s) Oral daily  budesonide 160 MICROgram(s)/formoterol 4.5 MICROgram(s) Inhaler 2 Puff(s) Inhalation two times a day  chlorhexidine 0.12% Liquid 15 milliLiter(s) Oral Mucosa every 12 hours  ciprofloxacin   IVPB 400 milliGRAM(s) IV Intermittent every 12 hours  clopidogrel Tablet 75 milliGRAM(s) Oral daily  dexMEDEtomidine Infusion 0.5 MICROgram(s)/kG/Hr (14.7 mL/Hr) IV Continuous <Continuous>  digoxin  Injectable 0.125 milliGRAM(s) IV Push daily  diltiazem    Tablet 60 milliGRAM(s) Oral every 6 hours  diltiazem Infusion 10 mG/Hr (10 mL/Hr) IV Continuous <Continuous>  folic acid 1 milliGRAM(s) Oral daily  gabapentin 400 milliGRAM(s) Oral three times a day  heparin   Injectable 5000 Unit(s) SubCutaneous every 8 hours  LORazepam   Injectable 2 milliGRAM(s) IV Push every 6 hours  multivitamin/minerals/iron Oral Solution (CENTRUM) 15 milliLiter(s) Oral daily  norepinephrine Infusion 0.05 MICROgram(s)/kG/Min (11.1 mL/Hr) IV Continuous <Continuous>  pantoprazole  Injectable 40 milliGRAM(s) IV Push daily  potassium chloride  10 mEq/100 mL IVPB 10 milliEquivalent(s) IV Intermittent every 1 hour  predniSONE   Tablet 20 milliGRAM(s) Oral daily  propofol Infusion 40 MICROgram(s)/kG/Min (28.3 mL/Hr) IV Continuous <Continuous>  thiamine 100 milliGRAM(s) Oral daily  tiotropium 18 MICROgram(s) Capsule 1 Capsule(s) Inhalation daily  vancomycin  IVPB 1000 milliGRAM(s) IV Intermittent every 12 hours    Vital Signs Last 24 Hrs  T(C): 37.2 (30 Apr 2020 05:17), Max: 38.9 (29 Apr 2020 17:00)  T(F): 98.9 (30 Apr 2020 05:17), Max: 102 (29 Apr 2020 17:00)  HR: 88 (30 Apr 2020 06:25) (63 - 93)  BP: 112/63 (30 Apr 2020 06:00) (76/46 - 129/71)  BP(mean): 72 (30 Apr 2020 06:00) (52 - 82)  RR: 17 (30 Apr 2020 06:00) (16 - 20)  SpO2: 91% (30 Apr 2020 06:25) (88% - 100%)    PHYSICAL EXAM:  Constitutional: intubated, sedated.  Neck:  supple,  No JVD  Respiratory: ETT to vent; no wheezing  Cardiovascular: Irregular, normal rate  Gastrointestinal: Bowel Sounds present, soft, nontender.   Extremities: No peripheral edema. No clubbing or cyanosis.    LABS:              16.3   17.93 )-----------( 76       ( 30 Apr 2020 06:53 )             51.6     146<H>  |  104  |  61<H>  ----------------------------<  205<H>  3.1<L>   |  38<H>  |  1.22    Transthoracic Echocardiogram (06.10.19 @ 20:49):   Estimated left ventricular ejection fraction is 60-65 %. The left ventricle is normal in wall thickness, wall motion and contractility as seen in limited views.   The left ventricle cavity is moderately dilated.   The left atrium is severely dilated.   The right atrium appears severely dilated.   Normal appearing right ventricle structure and function.   Moderate (2+) mitral regurgitation.   Mild to Moderate Tricuspid regurgitation.     US Duplex Venous Lower Ext Complete, Bilateral (04.19.20 @ 16:17):  No evidence of deep venous thrombosis in either lower extremity.    Tele: AF     CT Brain Stroke Protocol (04.22.20 @ 20:29):  No acute hemorrhage, mass effect or extra-axial collections.    CT Head No Cont (04.29.20 @ 12:17):   1.  No acute intracranial hemorrhage, extra-axial collection, hydrocephalus, midline shift or space-occupying mass lesion.  2.  No loss of gray-white junction or wedge-shaped infarct to suggest recent ischemic changes.  3.  Slightly greater fullness of the bodies of lateral ventricles compared with prior without evidence of hydrocephalus at this time.  4. No interval change in chronic and incidental findings.    CT Abdomen and Pelvis w/wo IV Cont (04.22.20 @ 09:09) >  1.  3 mm nonobstructing stone in the left ureterovesicular junction.  2.  7 mm nonobstructing stone in the lower pole right kidney.  3.  Subacute hemorrhage in the right rectus abdominis along the anterior sheath, measures 1.6 cm in thickness and extends from the umbilicus to the inferior myotendinous junction.  4.  Cirrhosis. Mild splenomegaly is new from the prior and suggestive of portal venous hypertension.

## 2020-04-30 NOTE — PROGRESS NOTE ADULT - SUBJECTIVE AND OBJECTIVE BOX
Patient is a 65y old  Male who presents with a chief complaint of acute hypoxemic, hypercapneic resp failure  COPD exacerbation (30 Apr 2020 09:01)      BRIEF HOSPITAL COURSE: 66 y/o male PMHx  chronic Afib s/p Watchman's device (4/19), HTN, prior CVAs, ETOH abuse, smoker, COPD, HFpEF, Cirrhosis, s/p L BKA admitted on 4/18 with acute COPD exacerbation. Hospital course complicated by ETOH withdrawal, delirium tremens. Pt was RRT due AMS and respiratory distress. CT head negative for any acute pathology. ABG with acute on chronic hypercapnic respiratory failure. Patient was intubated and then extubated on 4/26. Course complicated by AFib w/ RVR on Cardizem infusion, and ongoing encephalopathy. Intubated urgently 4/29 AM for worsening mental status, obtundation, inability to protect airway.        Events last 24 hours: Remains intubated on full vent support. Shock resolved, off pressors. AFib rate controlled, off Cardizem gtt. Febrile, Tmax 102'F last evening, now fever curve and WBC downtrending. ATN improving. Hypokalemia, supplemented.      PAST MEDICAL & SURGICAL HISTORY:  Chronic CHF  COPD without exacerbation  Atrial fibrillation  Presence of Watchman left atrial appendage closure device  Hypertension  GERD (gastroesophageal reflux disease)  Chronic obstructive pulmonary disease (COPD)  Anemia  Falls  Meningitis  Collapsed lung  Alcohol withdrawal  Emphysema of lung  Cirrhosis  CHF (congestive heart failure)  Poor historian  Alcohol abuse  Chronic atrial fibrillation  Unilateral amputation of lower extremity below knee  Presence of Watchman left atrial appendage closure device  S/P BKA (below knee amputation) unilateral, left        SOCIAL HISTORY: UATO due to AMS      Review of Systems: Due to altered mental status/intubation, subjective information was not able to be obtained from the patient. History was obtained, to the extent possible, from review of the chart and collateral sources of information.      Medications:  ciprofloxacin   IVPB 400 milliGRAM(s) IV Intermittent every 12 hours  vancomycin  IVPB 1000 milliGRAM(s) IV Intermittent every 12 hours  digoxin  Injectable 0.125 milliGRAM(s) IV Push daily  diltiazem    Tablet 60 milliGRAM(s) Oral every 6 hours  diltiazem Infusion 10 mG/Hr IV Continuous <Continuous>  norepinephrine Infusion 0.05 MICROgram(s)/kG/Min IV Continuous <Continuous>  ALBUTerol    90 MICROgram(s) HFA Inhaler 2 Puff(s) Inhalation every 4 hours  budesonide 160 MICROgram(s)/formoterol 4.5 MICROgram(s) Inhaler 2 Puff(s) Inhalation two times a day  tiotropium 18 MICROgram(s) Capsule 1 Capsule(s) Inhalation daily  acetaminophen   Tablet .. 650 milliGRAM(s) Oral every 6 hours PRN  dexMEDEtomidine Infusion 0.5 MICROgram(s)/kG/Hr IV Continuous <Continuous>  gabapentin 400 milliGRAM(s) Oral three times a day  LORazepam   Injectable 2 milliGRAM(s) IV Push every 6 hours  propofol Infusion 40 MICROgram(s)/kG/Min IV Continuous <Continuous>  aspirin  chewable 81 milliGRAM(s) Oral daily  clopidogrel Tablet 75 milliGRAM(s) Oral daily  heparin   Injectable 5000 Unit(s) SubCutaneous every 8 hours  pantoprazole  Injectable 40 milliGRAM(s) IV Push daily  polyethylene glycol 3350 17 Gram(s) Oral daily PRN  predniSONE   Tablet 20 milliGRAM(s) Oral daily  folic acid 1 milliGRAM(s) Oral daily  multivitamin/minerals/iron Oral Solution (CENTRUM) 15 milliLiter(s) Oral daily  potassium chloride  10 mEq/100 mL IVPB 10 milliEquivalent(s) IV Intermittent every 1 hour  thiamine 100 milliGRAM(s) Oral daily  chlorhexidine 0.12% Liquid 15 milliLiter(s) Oral Mucosa every 12 hours        Mode: AC/ CMV (Assist Control/ Continuous Mandatory Ventilation)  RR (machine): 16  TV (machine): 550  FiO2: 40  PEEP: 5  ITime: 1  MAP: 12  PIP: 29      ICU Vital Signs Last 24 Hrs  T(C): 37.2 (30 Apr 2020 05:17), Max: 38.9 (29 Apr 2020 17:00)  T(F): 98.9 (30 Apr 2020 05:17), Max: 102 (29 Apr 2020 17:00)  HR: 88 (30 Apr 2020 06:25) (63 - 93)  BP: 112/63 (30 Apr 2020 06:00) (76/46 - 129/71)  BP(mean): 72 (30 Apr 2020 06:00) (52 - 82)  ABP: --  ABP(mean): --  RR: 17 (30 Apr 2020 06:00) (16 - 20)  SpO2: 91% (30 Apr 2020 06:25) (88% - 100%)      ABG - ( 30 Apr 2020 06:26 )  pH, Arterial: 7.48  pH, Blood: x     /  pCO2: 53    /  pO2: 62    / HCO3: 39    / Base Excess: 12.7  /  SaO2: 91                  I&O's Detail    29 Apr 2020 07:01  -  30 Apr 2020 07:00  --------------------------------------------------------  IN:    Free Water: 1200 mL    IV PiggyBack: 1150 mL    norepinephrine Infusion: 144.3 mL    ns in tub fed  [id number]: 740 mL    propofol Infusion: 316.6 mL  Total IN: 3550.9 mL    OUT:    Voided: 2075 mL  Total OUT: 2075 mL    Total NET: 1475.9 mL            LABS:                        16.3   17.93 )-----------( 76       ( 30 Apr 2020 06:53 )             51.6     04-30    146<H>  |  104  |  61<H>  ----------------------------<  205<H>  3.1<L>   |  38<H>  |  1.22    Ca    7.8<L>      30 Apr 2020 06:53  Phos  2.8     04-30  Mg     2.8     04-30            CAPILLARY BLOOD GLUCOSE            CULTURES:  Culture Results:   No growth to date. (04-26 @ 01:01)  Culture Results:   No growth to date. (04-26 @ 00:56)  Culture Results:   Few Pseudomonas aeruginosa (Carbapenem Resistant)  Moderate Methicillin resistant Staphylococcus aureus  Normal Respiratory Faith present (04-25 @ 01:00)        Physical Examination:    General: Toxic, encephalopathic    HEENT: Pupils equal, reactive to light.  Symmetric.    PULM: Diffuse ronchi and expiratory wheezes    CVS: AFib rate controlled    ABD: Softly distended    EXT: Mild generalized anasarca. LLE BKA    SKIN: Warm and well perfused, skin flushed diffusely     NEURO: RASS -3        RADIOLOGY: < from: CT Head No Cont (04.29.20 @ 12:17) >    EXAM:  CT BRAIN                            PROCEDURE DATE:  04/29/2020          INTERPRETATION:  CT HEAD     History:  65-year-old; altered decreased mental status    Technique:  Computed tomography of the head was performed without intravenous contrast.  Sagittal and coronal 2-D reformatted images were also obtained.    Comparison:  Prior head CT dated 4/22/2020.    Findings:  Brain: No acute intracranial hemorrhage or large vessel infarct is identified; the gray-white junction is intact. No large mass, mass effect, or midline shift is seen. The midline structures are unremarkable. The brain demonstrates unremarkable morphology and volume for age.    Ventricles: No hydrocephalus. Slightly greater fullness of the bodies of the lateral ventricles compared with prior  Extra-axial spaces: No subdural or epidural collection. The convexity sulci and basal cisterns are preserved.   Vascular, intracranial: Mild intracranial atherosclerotic changes are noted.    Surgical Changes: Partially visualized left-sided nasogastric tube in place.  Calvarium: The calvarium is intact without focal osseous pathology. No significant scalp contusion identified.   Skull base: The middle ears and mastoid air cells are clear. TMJs are aligned.     Orbits/facial bones: Non dedicated views of the orbits are grossly unremarkable. No visualized fracture.   Paranasal sinuses: Minimal marginal patchy mucosal thickening. The visualized paranasal sinuses are generally well aerated. Moderate rightward deviation of the bony nasal septum with a 5 millimeter right sided impinging nasal septal spur.    Impression:  Head CT without contrast  1.  No acute intracranial hemorrhage, extra-axial collection, hydrocephalus, midline shift or space-occupying mass lesion.  2.  No loss of gray-white junction or wedge-shaped infarct to suggest recent ischemic changes.  3.  Slightly greater fullness of the bodies of lateral ventricles compared with prior without evidence of hydrocephalus at this time.  4. No interval change in chronic and incidental findings.    FELICIA ROSADO   This document has been electronically signed. Apr 29 2020 12:42PM            CRITICAL CARE TIME SPENT: 50 minutes spent performing frequent bedside reassessments and augmenting plan of care to address problems of acute critical illness that pose high probability of life threatening deterioration and/or end organ damage/dysfunction, non-inclusive of time spent on procedures performed.

## 2020-04-30 NOTE — PROGRESS NOTE ADULT - ASSESSMENT
64 y/o male PMHx  chronic Afib s/p Watchman's device (4/19), HTN, prior CVAs, ETOH abuse, smoker, COPD, HFpEF, Cirrhosis, s/p L BKA admitted on 4/18 with acute COPD exacerbation, with hospital course complicated by ETOH withdrawal, delirium tremens, acute on chronic hypercapnic respiratory failure necessitating emergent intubation, AFib with RVR. Now with severe sepsis secondary to MRSA and CRE Pseudomonas pneumonia.      Neuro: Toxic metabolic encephalopathy, multifactorial secondary to severe sepsis and DTs, as well as necessary treatment with benzodiazepines for active alcohol withdrawal with hallucinations. Ammonia level normal. Sedated comfortably on Propofol gtt.     Pulm: Intubated for acute on chronic hypercapnic hypoxemic respiratory failure. Augmenting vent to pH >7.2 and paO2 55-80. Overcompensated, RR decreased, allowing for permissive hypercapnea given chronic hypercapnea. Tapering steroids.    CV: Distributive shock, resolved, off Levophed. Afib RVR in the setting of ongoing sepsis, fevers. Rate controlled PO Cardizem and Digoxin, off Cardizem gtt. Supplementing lytes to K >4, Mg >2 for optimal arrhythmia suppression.     GI: Tube Feeds via NGT in lieu of IV fluids. PPI GI ppx.    Renal: Monitoring renal indices, ATN improving today. Free water for hyperchloremic hypernatremia, improving. Hypokalemic, supplemented.     ID: Severe sepsis MRSA and CRE Pseudomonas PNA, on Vancomycin and Cipro, respectively. Vanco trough supratherapeutic today, however was drawn immediately following administration of AM vancomycin dose. Will d/c for now, and repeat trough before PM dose. Will re-dose pending level. Patient has PCN allergy- Called [facility] to check for sensitivity to Colistin as alternate agent. Pending sensitivity testing. Blood cultures thus far negative to date. Trending fever curve.     Heme: Lovenox SC for DVT ppx. Continue Aspirin and Plavix

## 2020-04-30 NOTE — PROGRESS NOTE ADULT - ASSESSMENT
66 y/o male with h/o chronic A.Fib with Watchman device, CHF, COPD, HTN, obesity was admitted on 4/18 for worsening SOB. In ER he was found with rapid A.fib and was placed on NRB. He tested COVID-19 PCR negative x 3. Noted with hypercapnea and placed on BiPAP, then intubated and placed on ventilatory support. He was extubated on 4/26. He is reported with MDRO's in sputum c/s. He was treated with azithromycin from 4/20 to 4/25.     1. Acute respiratory failure. Febrile syndrome. 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 Allergy to PCN and cephalosporines.  -leukocytosis is improving  -respiratory poor  -encephalopathy  -cultures reviewed  -on vancomycin 1 gm IV q12h and cipro 400 mg IV q12h # 3  -tolerating abx well so far; no side effects noted  -obtain vancomycin trough level   -respiratory care  -continue IV abx coverage   -monitor temps  -f/u CBC  -supportive care  2. Other issues:   -care per medicine 66 y/o male with h/o chronic A.Fib with Watchman device, CHF, COPD, HTN, obesity was admitted on 4/18 for worsening SOB. In ER he was found with rapid A.fib and was placed on NRB. He tested COVID-19 PCR negative x 3. Noted with hypercapnea and placed on BiPAP, then intubated and placed on ventilatory support. He was extubated on 4/26. He is reported with MDRO's in sputum c/s. He was treated with azithromycin from 4/20 to 4/25.     1. Acute respiratory failure. Febrile syndrome. 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 Allergy to PCN and cephalosporines.  -leukocytosis is improving  -respiratory poor  -encephalopathy  -cultures reviewed  -on vancomycin 1 gm IV q12h and cipro 400 mg IV q12h # 3  -tolerating abx well so far; no side effects noted  -vancomycin trough level reported high; but on further investigation it is noted that the vancomycin level was collected after the vancomycin dose was given; this represents a peak level  -repeat vancomycin trough level at 16:30 today; vancomycin is on hold for now  -respiratory care  -continue IV abx coverage   -monitor temps  -f/u CBC  -supportive care  2. Other issues:   -care per medicine

## 2020-04-30 NOTE — CHART NOTE - NSCHARTNOTEFT_GEN_A_CORE
Spoke with patient's daughter, Litzy. Updated on clinical status, remains on ventilator, stable. Undergoing treatment for pneumonia, low grade fevers. HR improved, BP improved and stabilized.  Plan for SAT in AM, with SBT if mentation appropriate.

## 2020-04-30 NOTE — PROGRESS NOTE ADULT - SUBJECTIVE AND OBJECTIVE BOX
Interval History:  4/30/20: Remains intubated. Sedated with propofol.    MEDICATIONS  (STANDING):  ALBUTerol    90 MICROgram(s) HFA Inhaler 2 Puff(s) Inhalation every 4 hours  aspirin  chewable 81 milliGRAM(s) Oral daily  budesonide 160 MICROgram(s)/formoterol 4.5 MICROgram(s) Inhaler 2 Puff(s) Inhalation two times a day  chlorhexidine 0.12% Liquid 15 milliLiter(s) Oral Mucosa every 12 hours  ciprofloxacin   IVPB 400 milliGRAM(s) IV Intermittent every 12 hours  clopidogrel Tablet 75 milliGRAM(s) Oral daily  dexMEDEtomidine Infusion 0.5 MICROgram(s)/kG/Hr (14.7 mL/Hr) IV Continuous <Continuous>  digoxin  Injectable 0.125 milliGRAM(s) IV Push daily  diltiazem    Tablet 60 milliGRAM(s) Oral every 6 hours  diltiazem Infusion 10 mG/Hr (10 mL/Hr) IV Continuous <Continuous>  folic acid 1 milliGRAM(s) Oral daily  gabapentin 400 milliGRAM(s) Oral three times a day  heparin   Injectable 5000 Unit(s) SubCutaneous every 8 hours  LORazepam   Injectable 2 milliGRAM(s) IV Push every 6 hours  multivitamin/minerals/iron Oral Solution (CENTRUM) 15 milliLiter(s) Oral daily  norepinephrine Infusion 0.05 MICROgram(s)/kG/Min (11.1 mL/Hr) IV Continuous <Continuous>  pantoprazole  Injectable 40 milliGRAM(s) IV Push daily  potassium chloride  10 mEq/100 mL IVPB 10 milliEquivalent(s) IV Intermittent every 1 hour  predniSONE   Tablet 20 milliGRAM(s) Oral daily  propofol Infusion 40 MICROgram(s)/kG/Min (28.3 mL/Hr) IV Continuous <Continuous>  thiamine 100 milliGRAM(s) Oral daily  tiotropium 18 MICROgram(s) Capsule 1 Capsule(s) Inhalation daily  vancomycin  IVPB 1000 milliGRAM(s) IV Intermittent every 12 hours    MEDICATIONS  (PRN):  acetaminophen   Tablet .. 650 milliGRAM(s) Oral every 6 hours PRN Temp greater or equal to 38.5C (101.3F)  polyethylene glycol 3350 17 Gram(s) Oral daily PRN Constipation      Allergies    Ceclor (Rash)  Duricef (Rash)    Intolerances        PHYSICAL EXAM:  Vital Signs Last 24 Hrs  T(F): 98.9 (04-30-20 @ 05:17)  HR: 88 (04-30-20 @ 06:25)  BP: 112/63 (04-30-20 @ 06:00)  RR: 17 (04-30-20 @ 06:00)    GENERAL: NAD, well-groomed, well-developed  HEAD:  Atraumatic, Normocephalic  Neuro:  Intubated, on ventilator. Sedated with propofol.  Pupils reactive to light  Negative oculoceaphalics  + corneals  minimal gag  no withdrawal to noxious stimuli    LABS:                        16.3   17.93 )-----------( 76       ( 30 Apr 2020 06:53 )             51.6     04-30    146<H>  |  104  |  61<H>  ----------------------------<  205<H>  3.1<L>   |  38<H>  |  1.22    Ca    7.8<L>      30 Apr 2020 06:53  Phos  2.8     04-30  Mg     2.8     04-30            RADIOLOGY & ADDITIONAL STUDIES:    CT Head  < from: CT Brain Stroke Protocol (04.22.20 @ 20:29) >  Impression:    No acute hemorrhage, mass effect or extra-axial collections.      < from: CT Angio Neck w/ IV Cont (04.22.20 @ 21:58) >  IMPRESSION:  Patent cervical and intracranial major arterial vasculature without evidence of abrupt vessel cut off, hemodynamically significant stenosis, aneurysm, or dissection.    EEG :   < from: EEG Awake and Asleep (04.23.20 @ 10:30) >  Impression:  This is an mild abnormal EEG due to the presence of  1. Mild generalized background slowing may be on the basis of underlying cerebral dysfunction may be on the basis of underlying metabolic, toxic or structural pathology or due to sedation.  2. Excess beta activity noted related to medication effect. Clinical correlation recommended.  3. No epileptiform activity noted. Clinical correlation recommended.  Repeat study without sedation if clinically indicated.

## 2020-04-30 NOTE — PROGRESS NOTE ADULT - SUBJECTIVE AND OBJECTIVE BOX
Subjective:    pat on vent, off pressors, off cardizem, sedated, iv propofol/lorazem.    Home Medications:  albuterol 2.5 mg/3 mL (0.083%) inhalation solution: 3 milliliter(s) inhaled every 6 hours, As Needed -for shortness of breath and/or wheezing (19 Apr 2020 03:12)  gabapentin 400 mg oral capsule: 1 cap(s) orally 3 times a day (19 Apr 2020 03:12)  pantoprazole 40 mg oral granule, enteric coated: 40 milligram(s) orally once a day (19 Apr 2020 03:12)  Spiriva 18 mcg inhalation capsule: 1 cap(s) inhaled once a day (19 Apr 2020 03:12)  tamsulosin 0.4 mg oral capsule: 1 cap(s) orally once a day (at bedtime) (19 Apr 2020 03:12)  traZODone 50 mg oral tablet: 2 tab(s) orally once a day (at bedtime), As Needed (19 Apr 2020 03:12)    MEDICATIONS  (STANDING):  ALBUTerol    90 MICROgram(s) HFA Inhaler 2 Puff(s) Inhalation every 4 hours  aspirin  chewable 81 milliGRAM(s) Oral daily  budesonide 160 MICROgram(s)/formoterol 4.5 MICROgram(s) Inhaler 2 Puff(s) Inhalation two times a day  chlorhexidine 0.12% Liquid 15 milliLiter(s) Oral Mucosa every 12 hours  ciprofloxacin   IVPB 400 milliGRAM(s) IV Intermittent every 12 hours  clopidogrel Tablet 75 milliGRAM(s) Oral daily  digoxin  Injectable 0.125 milliGRAM(s) IV Push daily  diltiazem    Tablet 60 milliGRAM(s) Oral every 6 hours  folic acid 1 milliGRAM(s) Oral daily  gabapentin 400 milliGRAM(s) Oral three times a day  heparin   Injectable 5000 Unit(s) SubCutaneous every 8 hours  LORazepam   Injectable 2 milliGRAM(s) IV Push every 6 hours  multivitamin/minerals/iron Oral Solution (CENTRUM) 15 milliLiter(s) Oral daily  pantoprazole  Injectable 40 milliGRAM(s) IV Push daily  predniSONE   Tablet 20 milliGRAM(s) Oral daily  propofol Infusion 40 MICROgram(s)/kG/Min (28.3 mL/Hr) IV Continuous <Continuous>  thiamine 100 milliGRAM(s) Oral daily  tiotropium 18 MICROgram(s) Capsule 1 Capsule(s) Inhalation daily    MEDICATIONS  (PRN):  acetaminophen   Tablet .. 650 milliGRAM(s) Oral every 6 hours PRN Temp greater or equal to 38.5C (101.3F)  polyethylene glycol 3350 17 Gram(s) Oral daily PRN Constipation      Allergies    Ceclor (Rash)  Duricef (Rash)    Intolerances        Vital Signs Last 24 Hrs  T(C): 37.3 (30 Apr 2020 11:00), Max: 38.9 (29 Apr 2020 17:00)  T(F): 99.1 (30 Apr 2020 11:00), Max: 102 (29 Apr 2020 17:00)  HR: 107 (30 Apr 2020 11:00) (63 - 107)  BP: 124/74 (30 Apr 2020 11:00) (76/46 - 129/71)  BP(mean): 84 (30 Apr 2020 11:00) (52 - 84)  RR: 18 (30 Apr 2020 11:00) (16 - 20)  SpO2: 95% (30 Apr 2020 11:00) (88% - 100%)  Mode: AC/ CMV (Assist Control/ Continuous Mandatory Ventilation)  RR (machine): 12  TV (machine): 550  FiO2: 40  PEEP: 5  ITime: 1  MAP: 13  PIP: 31      PHYSICAL EXAMINATION:    NECK:  Supple. No lymphadenopathy. Jugular venous pressure not elevated. Carotids equal.   HEART:   The cardiac impulse has a normal quality. Reg., Nl S1 and S2.  There are no murmurs, rubs or gallops noted  CHEST:  Chest poor air entry to auscultation. Normal respiratory effort.  ABDOMEN:  Soft and nontender.   EXTREMITIES:  There is no edema.       LABS:                        16.3   17.93 )-----------( 76       ( 30 Apr 2020 06:53 )             51.6     04-30    146<H>  |  104  |  61<H>  ----------------------------<  205<H>  3.1<L>   |  38<H>  |  1.22    Ca    7.8<L>      30 Apr 2020 06:53  Phos  2.8     04-30  Mg     2.8     04-30    Culture - Sputum (collected 04-29-20 @ 15:00)  Source: .Sputum Sputum  Gram Stain (04-29-20 @ 22:35):    Numerous polymorphonuclear leukocytes per low power field    No Squamous epithelial cells per low power field    Numerous Gram Positive Cocci in Clusters per oil power field    Few Gram Positive Rods per oil power field    Few Gram Negative Rods per oil power field

## 2020-04-30 NOTE — PROGRESS NOTE ADULT - ASSESSMENT
· Assessment		  65 year old male w chronic AFib w Watchman device, CHF, COPD, HTN, alcohol abuse, obesity came to ED w EMS c/o SOB since the morning of 04-18.  Per EMS, pt was in rapid afib in 180s upon EMS arrival with O2 sat in 80 acute respiratory failure with hypercapnia due to COPD exacerbation .    Neurology called after episode of altered mental status and slurred speech on 4/22.    Hospital course complicated by ETOH withdrawal.  Patient reintubated 4/29 with worsening mental status and respiratory distress.    -Earlier in hospital course patient had CT head which was negative for stroke and EEG suggestive of encephalopathy.  -AMS multifactorial including hypoxic encephalopathy and toxic metabolic encephalopathy as well as ETOH withdrawal  -Now being treated for probable pneumonia with MRSA.  -AF with RVR. Has watchman procedure and not on anticoagulation    At this time he remains sedated making neuro exam difficult.  Will reassess as needed.

## 2020-04-30 NOTE — PROGRESS NOTE ADULT - SUBJECTIVE AND OBJECTIVE BOX
Date of service: 04-30-20 @ 11:04    Lying in bed in NAD  Intubated on ventilatory support  Febrile to 102F  Lethargic    ROS: unobtainable    MEDICATIONS  (STANDING):  ALBUTerol    90 MICROgram(s) HFA Inhaler 2 Puff(s) Inhalation every 4 hours  aspirin  chewable 81 milliGRAM(s) Oral daily  budesonide 160 MICROgram(s)/formoterol 4.5 MICROgram(s) Inhaler 2 Puff(s) Inhalation two times a day  chlorhexidine 0.12% Liquid 15 milliLiter(s) Oral Mucosa every 12 hours  ciprofloxacin   IVPB 400 milliGRAM(s) IV Intermittent every 12 hours  clopidogrel Tablet 75 milliGRAM(s) Oral daily  digoxin  Injectable 0.125 milliGRAM(s) IV Push daily  diltiazem    Tablet 60 milliGRAM(s) Oral every 6 hours  folic acid 1 milliGRAM(s) Oral daily  gabapentin 400 milliGRAM(s) Oral three times a day  heparin   Injectable 5000 Unit(s) SubCutaneous every 8 hours  LORazepam   Injectable 2 milliGRAM(s) IV Push every 6 hours  multivitamin/minerals/iron Oral Solution (CENTRUM) 15 milliLiter(s) Oral daily  pantoprazole  Injectable 40 milliGRAM(s) IV Push daily  potassium chloride  10 mEq/100 mL IVPB 10 milliEquivalent(s) IV Intermittent every 1 hour  predniSONE   Tablet 20 milliGRAM(s) Oral daily  propofol Infusion 40 MICROgram(s)/kG/Min (28.3 mL/Hr) IV Continuous <Continuous>  thiamine 100 milliGRAM(s) Oral daily  tiotropium 18 MICROgram(s) Capsule 1 Capsule(s) Inhalation daily      Vital Signs Last 24 Hrs  T(C): 37.2 (30 Apr 2020 05:17), Max: 38.9 (29 Apr 2020 17:00)  T(F): 98.9 (30 Apr 2020 05:17), Max: 102 (29 Apr 2020 17:00)  HR: 101 (30 Apr 2020 10:57) (63 - 101)  BP: 112/63 (30 Apr 2020 06:00) (76/46 - 129/71)  BP(mean): 72 (30 Apr 2020 06:00) (52 - 82)  RR: 17 (30 Apr 2020 06:00) (16 - 20)  SpO2: 97% (30 Apr 2020 10:57) (88% - 100%)    Mode: AC/ CMV (Assist Control/ Continuous Mandatory Ventilation), RR (machine): 12, TV (machine): 550, FiO2: 40, PEEP: 5, ITime: 1, MAP: 13, PIP: 31    Physical exam:    Constitutional:  No acute distress  HEENT: NC/AT, EOMI, PERRLA, conjunctivae clear  Neck: supple; thyroid not palpable  Back: no tenderness  Respiratory: respiratory effort normal; b/l rhonchi  Cardiovascular: S1S2 regular, no murmurs  Abdomen: soft, not tender, not distended, positive BS  Genitourinary: no suprapubic tenderness  Lymphatic: no LN palpable  Musculoskeletal: no muscle tenderness, no joint swelling or tenderness  Extremities: no pedal edema  Neurological/ Psychiatric: confused; moving all extremities  Skin: no rashes; no palpable lesions    Labs: reviewed                        16.3   17.93 )-----------( 76       ( 30 Apr 2020 06:53 )             51.6     04-30    146<H>  |  104  |  61<H>  ----------------------------<  205<H>  3.1<L>   |  38<H>  |  1.22    Ca    7.8<L>      30 Apr 2020 06:53  Phos  2.8     04-30  Mg     2.8     04-30    Vancomycin Level, Trough: 39.2 ug/mL (04-30 @ 06:53)    C-Reactive Protein, Serum: 1.14 mg/dL (04-19-20 @ 11:33)  C-Reactive Protein, Serum: 1.12 mg/dL (04-18-20 @ 19:43)  D-Dimer Assay, Quantitative: <150 ng/mL DDU (04-18-20 @ 19:43)  Ferritin, Serum: 151 ng/mL (04-18-20 @ 19:43)                        18.0   27.13 )-----------( 126      ( 29 Apr 2020 07:28 )             58.3     04-29    149<H>  |  107  |  67<H>  ----------------------------<  181<H>  4.3   |  37<H>  |  1.58<H>    Ca    8.3<L>      29 Apr 2020 07:28  Phos  6.9     04-29  Mg     2.9     04-29                 17.7   14.05 )-----------( 133      ( 28 Apr 2020 06:48 )             54.5     04-28    154<H>  |  114<H>  |  50<H>  ----------------------------<  172<H>  3.8   |  36<H>  |  1.07    Ca    8.6      28 Apr 2020 06:48  Phos  3.2     04-28  Mg     2.8     04-28    COVID-19 PCR . (04.26.20 @ 00:02)    COVID-19 PCR: NotDetec      Culture - Blood (collected 26 Apr 2020 01:01)  Source: .Blood None  Preliminary Report (27 Apr 2020 10:02):    No growth to date.    Culture - Blood (collected 26 Apr 2020 00:56)  Source: .Blood None  Preliminary Report (27 Apr 2020 10:02):    No growth to date.    Culture - Sputum (collected 25 Apr 2020 01:00)  Source: .Sputum Sputum  trap  Gram Stain (26 Apr 2020 12:29):    Moderate polymorphonuclear leukocytes per low power field    No Squamous epithelial cells per low power field    Rare Gram Positive Rods per oil power field    Rare Gram Positive Cocci in Pairs and Chains per oil power field  Final Report (28 Apr 2020 10:53):    Few Pseudomonas aeruginosa (Carbapenem Resistant)    Moderate Methicillin resistant Staphylococcus aureus    Normal Respiratory Faith present  Organism: Pseudomonas aeruginosa (Carbapenem Resistant)  Methicillin resistant Staphylococcus aureus (28 Apr 2020 10:44)  Organism: Pseudomonas aeruginosa (Carbapenem Resistant) (28 Apr 2020 10:44)      -  Amikacin: S <=16      -  Aztreonam: R >16      -  Cefepime: R >16      -  Ceftazidime: R >16      -  Ciprofloxacin: S <=0.25      -  Gentamicin: S 4      -  Imipenem: R >8      -  Levofloxacin: S <=0.5      -  Meropenem: R >8      -  Piperacillin/Tazobactam: R >64      -  Tobramycin: S <=2      Method Type: LADY  Organism: Methicillin resistant Staphylococcus aureus (28 Apr 2020 10:40)      -  Amoxicillin/Clavulanic Acid: R >4/2      -  Ampicillin: R >8      -  Ampicillin/Sulbactam: R <=8/4      -  Cefazolin: R >16      -  Ceftriaxone: R >32      -  Ciprofloxacin: R >2      -  Clindamycin: R >4      -  Daptomycin: S 0.5      -  Erythromycin: R >4      -  Gentamicin: S <=1 Should not be used as monotherapy      -  Levofloxacin: R >4      -  Linezolid: S 4      -  Meropenem: R >8      -  Moxifloxacin(Aerobic): R >4      -  Oxacillin: R >2      -  Penicillin: R >8      -  RIF- Rifampin: S <=1 Should not be used as monotherapy      -  Tetra/Doxy: S 2      -  Trimethoprim/Sulfamethoxazole: S <=0.5/9.5      -  Vancomycin: S 2      Method Type: LADY    Culture - Urine (collected 21 Apr 2020 15:51)  Source: .Urine Clean Catch (Midstream)  Final Report (22 Apr 2020 17:03):    <10,000 CFU/mL Normal Urogenital Faith    Culture - Blood (collected 18 Apr 2020 19:43)  Source: .Blood Blood-Peripheral  Final Report (24 Apr 2020 01:01):    No Growth Final    Radiology: all available radiological tests reviewed    < from: Xray Chest 1 View AP/PA. (04.25.20 @ 10:12) >  Percent of LEFT lung opacification: Clear  Percent of RIGHT lung opacification: Clear  Change in lung opacification from most recent x-ray (<=3 days): Stable  Change from prior dated 3 or more days (same admission): No Prior    < end of copied text >    < from: Xray Chest 1 View-PORTABLE IMMEDIATE (04.29.20 @ 07:36) >  Percent of LEFT lung opacification: %  Percent of RIGHT lung opacification: Clear  Change in lung opacification from most recent x-ray (<=3 days): Increase  Change from prior dated 3or more days (same admission): Increase    < end of copied text >      Advanced directives addressed: full resuscitation Date of service: 04-30-20 @ 11:04    Lying in bed in NAD  Intubated on ventilatory support  Febrile to 102F  Lethargic  Vancomycin level is reported high    ROS: unobtainable    MEDICATIONS  (STANDING):  ALBUTerol    90 MICROgram(s) HFA Inhaler 2 Puff(s) Inhalation every 4 hours  aspirin  chewable 81 milliGRAM(s) Oral daily  budesonide 160 MICROgram(s)/formoterol 4.5 MICROgram(s) Inhaler 2 Puff(s) Inhalation two times a day  chlorhexidine 0.12% Liquid 15 milliLiter(s) Oral Mucosa every 12 hours  ciprofloxacin   IVPB 400 milliGRAM(s) IV Intermittent every 12 hours  clopidogrel Tablet 75 milliGRAM(s) Oral daily  digoxin  Injectable 0.125 milliGRAM(s) IV Push daily  diltiazem    Tablet 60 milliGRAM(s) Oral every 6 hours  folic acid 1 milliGRAM(s) Oral daily  gabapentin 400 milliGRAM(s) Oral three times a day  heparin   Injectable 5000 Unit(s) SubCutaneous every 8 hours  LORazepam   Injectable 2 milliGRAM(s) IV Push every 6 hours  multivitamin/minerals/iron Oral Solution (CENTRUM) 15 milliLiter(s) Oral daily  pantoprazole  Injectable 40 milliGRAM(s) IV Push daily  potassium chloride  10 mEq/100 mL IVPB 10 milliEquivalent(s) IV Intermittent every 1 hour  predniSONE   Tablet 20 milliGRAM(s) Oral daily  propofol Infusion 40 MICROgram(s)/kG/Min (28.3 mL/Hr) IV Continuous <Continuous>  thiamine 100 milliGRAM(s) Oral daily  tiotropium 18 MICROgram(s) Capsule 1 Capsule(s) Inhalation daily      Vital Signs Last 24 Hrs  T(C): 37.2 (30 Apr 2020 05:17), Max: 38.9 (29 Apr 2020 17:00)  T(F): 98.9 (30 Apr 2020 05:17), Max: 102 (29 Apr 2020 17:00)  HR: 101 (30 Apr 2020 10:57) (63 - 101)  BP: 112/63 (30 Apr 2020 06:00) (76/46 - 129/71)  BP(mean): 72 (30 Apr 2020 06:00) (52 - 82)  RR: 17 (30 Apr 2020 06:00) (16 - 20)  SpO2: 97% (30 Apr 2020 10:57) (88% - 100%)    Mode: AC/ CMV (Assist Control/ Continuous Mandatory Ventilation), RR (machine): 12, TV (machine): 550, FiO2: 40, PEEP: 5, ITime: 1, MAP: 13, PIP: 31    Physical exam:    Constitutional:  No acute distress  HEENT: NC/AT, EOMI, PERRLA, conjunctivae clear  Neck: supple; thyroid not palpable  Back: no tenderness  Respiratory: respiratory effort normal; b/l rhonchi  Cardiovascular: S1S2 regular, no murmurs  Abdomen: soft, not tender, not distended, positive BS  Genitourinary: no suprapubic tenderness  Lymphatic: no LN palpable  Musculoskeletal: no muscle tenderness, no joint swelling or tenderness  Extremities: no pedal edema  Neurological/ Psychiatric: confused; moving all extremities  Skin: no rashes; no palpable lesions    Labs: reviewed                        16.3   17.93 )-----------( 76       ( 30 Apr 2020 06:53 )             51.6     04-30    146<H>  |  104  |  61<H>  ----------------------------<  205<H>  3.1<L>   |  38<H>  |  1.22    Ca    7.8<L>      30 Apr 2020 06:53  Phos  2.8     04-30  Mg     2.8     04-30    Vancomycin Level, Trough: 39.2 ug/mL (04-30 @ 06:53)    C-Reactive Protein, Serum: 1.14 mg/dL (04-19-20 @ 11:33)  C-Reactive Protein, Serum: 1.12 mg/dL (04-18-20 @ 19:43)  D-Dimer Assay, Quantitative: <150 ng/mL DDU (04-18-20 @ 19:43)  Ferritin, Serum: 151 ng/mL (04-18-20 @ 19:43)                        18.0   27.13 )-----------( 126      ( 29 Apr 2020 07:28 )             58.3     04-29    149<H>  |  107  |  67<H>  ----------------------------<  181<H>  4.3   |  37<H>  |  1.58<H>    Ca    8.3<L>      29 Apr 2020 07:28  Phos  6.9     04-29  Mg     2.9     04-29                 17.7   14.05 )-----------( 133      ( 28 Apr 2020 06:48 )             54.5     04-28    154<H>  |  114<H>  |  50<H>  ----------------------------<  172<H>  3.8   |  36<H>  |  1.07    Ca    8.6      28 Apr 2020 06:48  Phos  3.2     04-28  Mg     2.8     04-28    COVID-19 PCR . (04.26.20 @ 00:02)    COVID-19 PCR: NotDetec      Culture - Blood (collected 26 Apr 2020 01:01)  Source: .Blood None  Preliminary Report (27 Apr 2020 10:02):    No growth to date.    Culture - Blood (collected 26 Apr 2020 00:56)  Source: .Blood None  Preliminary Report (27 Apr 2020 10:02):    No growth to date.    Culture - Sputum (collected 25 Apr 2020 01:00)  Source: .Sputum Sputum  trap  Gram Stain (26 Apr 2020 12:29):    Moderate polymorphonuclear leukocytes per low power field    No Squamous epithelial cells per low power field    Rare Gram Positive Rods per oil power field    Rare Gram Positive Cocci in Pairs and Chains per oil power field  Final Report (28 Apr 2020 10:53):    Few Pseudomonas aeruginosa (Carbapenem Resistant)    Moderate Methicillin resistant Staphylococcus aureus    Normal Respiratory Faith present  Organism: Pseudomonas aeruginosa (Carbapenem Resistant)  Methicillin resistant Staphylococcus aureus (28 Apr 2020 10:44)  Organism: Pseudomonas aeruginosa (Carbapenem Resistant) (28 Apr 2020 10:44)      -  Amikacin: S <=16      -  Aztreonam: R >16      -  Cefepime: R >16      -  Ceftazidime: R >16      -  Ciprofloxacin: S <=0.25      -  Gentamicin: S 4      -  Imipenem: R >8      -  Levofloxacin: S <=0.5      -  Meropenem: R >8      -  Piperacillin/Tazobactam: R >64      -  Tobramycin: S <=2      Method Type: LADY  Organism: Methicillin resistant Staphylococcus aureus (28 Apr 2020 10:40)      -  Amoxicillin/Clavulanic Acid: R >4/2      -  Ampicillin: R >8      -  Ampicillin/Sulbactam: R <=8/4      -  Cefazolin: R >16      -  Ceftriaxone: R >32      -  Ciprofloxacin: R >2      -  Clindamycin: R >4      -  Daptomycin: S 0.5      -  Erythromycin: R >4      -  Gentamicin: S <=1 Should not be used as monotherapy      -  Levofloxacin: R >4      -  Linezolid: S 4      -  Meropenem: R >8      -  Moxifloxacin(Aerobic): R >4      -  Oxacillin: R >2      -  Penicillin: R >8      -  RIF- Rifampin: S <=1 Should not be used as monotherapy      -  Tetra/Doxy: S 2      -  Trimethoprim/Sulfamethoxazole: S <=0.5/9.5      -  Vancomycin: S 2      Method Type: LADY    Culture - Urine (collected 21 Apr 2020 15:51)  Source: .Urine Clean Catch (Midstream)  Final Report (22 Apr 2020 17:03):    <10,000 CFU/mL Normal Urogenital Faith    Culture - Blood (collected 18 Apr 2020 19:43)  Source: .Blood Blood-Peripheral  Final Report (24 Apr 2020 01:01):    No Growth Final    Radiology: all available radiological tests reviewed    < from: Xray Chest 1 View AP/PA. (04.25.20 @ 10:12) >  Percent of LEFT lung opacification: Clear  Percent of RIGHT lung opacification: Clear  Change in lung opacification from most recent x-ray (<=3 days): Stable  Change from prior dated 3 or more days (same admission): No Prior    < end of copied text >    < from: Xray Chest 1 View-PORTABLE IMMEDIATE (04.29.20 @ 07:36) >  Percent of LEFT lung opacification: %  Percent of RIGHT lung opacification: Clear  Change in lung opacification from most recent x-ray (<=3 days): Increase  Change from prior dated 3or more days (same admission): Increase    < end of copied text >      Advanced directives addressed: full resuscitation

## 2020-05-01 LAB
-  AMPICILLIN/SULBACTAM: SIGNIFICANT CHANGE UP
-  CEFAZOLIN: SIGNIFICANT CHANGE UP
-  CLINDAMYCIN: SIGNIFICANT CHANGE UP
-  ERYTHROMYCIN: SIGNIFICANT CHANGE UP
-  GENTAMICIN: SIGNIFICANT CHANGE UP
-  LINEZOLID: SIGNIFICANT CHANGE UP
-  OXACILLIN: SIGNIFICANT CHANGE UP
-  PENICILLIN: SIGNIFICANT CHANGE UP
-  RIFAMPIN: SIGNIFICANT CHANGE UP
-  TETRACYCLINE: SIGNIFICANT CHANGE UP
-  TRIMETHOPRIM/SULFAMETHOXAZOLE: SIGNIFICANT CHANGE UP
-  VANCOMYCIN: SIGNIFICANT CHANGE UP
ANION GAP SERPL CALC-SCNC: 4 MMOL/L — LOW (ref 5–17)
BUN SERPL-MCNC: 61 MG/DL — HIGH (ref 7–23)
CALCIUM SERPL-MCNC: 8 MG/DL — LOW (ref 8.5–10.1)
CHLORIDE SERPL-SCNC: 102 MMOL/L — SIGNIFICANT CHANGE UP (ref 96–108)
CO2 SERPL-SCNC: 38 MMOL/L — HIGH (ref 22–31)
CREAT SERPL-MCNC: 1 MG/DL — SIGNIFICANT CHANGE UP (ref 0.5–1.3)
CULTURE RESULTS: SIGNIFICANT CHANGE UP
GLUCOSE SERPL-MCNC: 150 MG/DL — HIGH (ref 70–99)
HCT VFR BLD CALC: 52.7 % — HIGH (ref 39–50)
HGB BLD-MCNC: 16.6 G/DL — SIGNIFICANT CHANGE UP (ref 13–17)
MAGNESIUM SERPL-MCNC: 2.9 MG/DL — HIGH (ref 1.6–2.6)
MCHC RBC-ENTMCNC: 31.5 GM/DL — LOW (ref 32–36)
MCHC RBC-ENTMCNC: 31.9 PG — SIGNIFICANT CHANGE UP (ref 27–34)
MCV RBC AUTO: 101.2 FL — HIGH (ref 80–100)
METHOD TYPE: SIGNIFICANT CHANGE UP
ORGANISM # SPEC MICROSCOPIC CNT: SIGNIFICANT CHANGE UP
ORGANISM # SPEC MICROSCOPIC CNT: SIGNIFICANT CHANGE UP
PHOSPHATE SERPL-MCNC: 3.1 MG/DL — SIGNIFICANT CHANGE UP (ref 2.5–4.5)
PLATELET # BLD AUTO: 75 K/UL — LOW (ref 150–400)
POTASSIUM SERPL-MCNC: 3.6 MMOL/L — SIGNIFICANT CHANGE UP (ref 3.5–5.3)
POTASSIUM SERPL-SCNC: 3.6 MMOL/L — SIGNIFICANT CHANGE UP (ref 3.5–5.3)
RBC # BLD: 5.21 M/UL — SIGNIFICANT CHANGE UP (ref 4.2–5.8)
RBC # FLD: 13.1 % — SIGNIFICANT CHANGE UP (ref 10.3–14.5)
SODIUM SERPL-SCNC: 144 MMOL/L — SIGNIFICANT CHANGE UP (ref 135–145)
SPECIMEN SOURCE: SIGNIFICANT CHANGE UP
WBC # BLD: 21.69 K/UL — HIGH (ref 3.8–10.5)
WBC # FLD AUTO: 21.69 K/UL — HIGH (ref 3.8–10.5)

## 2020-05-01 PROCEDURE — 99233 SBSQ HOSP IP/OBS HIGH 50: CPT

## 2020-05-01 PROCEDURE — 71045 X-RAY EXAM CHEST 1 VIEW: CPT | Mod: 26

## 2020-05-01 PROCEDURE — 99232 SBSQ HOSP IP/OBS MODERATE 35: CPT

## 2020-05-01 RX ORDER — MIDODRINE HYDROCHLORIDE 2.5 MG/1
10 TABLET ORAL EVERY 8 HOURS
Refills: 0 | Status: DISCONTINUED | OUTPATIENT
Start: 2020-05-01 | End: 2020-05-02

## 2020-05-01 RX ORDER — NOREPINEPHRINE BITARTRATE/D5W 8 MG/250ML
0.05 PLASTIC BAG, INJECTION (ML) INTRAVENOUS
Qty: 8 | Refills: 0 | Status: DISCONTINUED | OUTPATIENT
Start: 2020-05-01 | End: 2020-05-09

## 2020-05-01 RX ORDER — DEXMEDETOMIDINE HYDROCHLORIDE IN 0.9% SODIUM CHLORIDE 4 UG/ML
1 INJECTION INTRAVENOUS
Qty: 200 | Refills: 0 | Status: DISCONTINUED | OUTPATIENT
Start: 2020-05-01 | End: 2020-05-08

## 2020-05-01 RX ADMIN — PANTOPRAZOLE SODIUM 40 MILLIGRAM(S): 20 TABLET, DELAYED RELEASE ORAL at 12:29

## 2020-05-01 RX ADMIN — GABAPENTIN 400 MILLIGRAM(S): 400 CAPSULE ORAL at 06:17

## 2020-05-01 RX ADMIN — Medication 650 MILLIGRAM(S): at 09:35

## 2020-05-01 RX ADMIN — Medication 81 MILLIGRAM(S): at 12:28

## 2020-05-01 RX ADMIN — CLOPIDOGREL BISULFATE 75 MILLIGRAM(S): 75 TABLET, FILM COATED ORAL at 12:28

## 2020-05-01 RX ADMIN — Medication 200 MILLIGRAM(S): at 17:01

## 2020-05-01 RX ADMIN — Medication 166.67 MILLIGRAM(S): at 07:18

## 2020-05-01 RX ADMIN — Medication 100 MILLIGRAM(S): at 12:28

## 2020-05-01 RX ADMIN — Medication 60 MILLIGRAM(S): at 12:28

## 2020-05-01 RX ADMIN — CHLORHEXIDINE GLUCONATE 15 MILLILITER(S): 213 SOLUTION TOPICAL at 06:17

## 2020-05-01 RX ADMIN — Medication 1 MILLIGRAM(S): at 12:28

## 2020-05-01 RX ADMIN — GABAPENTIN 400 MILLIGRAM(S): 400 CAPSULE ORAL at 12:29

## 2020-05-01 RX ADMIN — Medication 200 MILLIGRAM(S): at 07:18

## 2020-05-01 RX ADMIN — Medication 0.12 MILLIGRAM(S): at 12:43

## 2020-05-01 RX ADMIN — MIDODRINE HYDROCHLORIDE 10 MILLIGRAM(S): 2.5 TABLET ORAL at 22:43

## 2020-05-01 RX ADMIN — DEXMEDETOMIDINE HYDROCHLORIDE IN 0.9% SODIUM CHLORIDE 29.5 MICROGRAM(S)/KG/HR: 4 INJECTION INTRAVENOUS at 09:35

## 2020-05-01 RX ADMIN — Medication 60 MILLIGRAM(S): at 06:17

## 2020-05-01 RX ADMIN — HEPARIN SODIUM 5000 UNIT(S): 5000 INJECTION INTRAVENOUS; SUBCUTANEOUS at 06:17

## 2020-05-01 RX ADMIN — Medication 20 MILLIGRAM(S): at 06:17

## 2020-05-01 RX ADMIN — CHLORHEXIDINE GLUCONATE 15 MILLILITER(S): 213 SOLUTION TOPICAL at 17:02

## 2020-05-01 RX ADMIN — HEPARIN SODIUM 5000 UNIT(S): 5000 INJECTION INTRAVENOUS; SUBCUTANEOUS at 22:45

## 2020-05-01 RX ADMIN — GABAPENTIN 400 MILLIGRAM(S): 400 CAPSULE ORAL at 22:45

## 2020-05-01 RX ADMIN — HEPARIN SODIUM 5000 UNIT(S): 5000 INJECTION INTRAVENOUS; SUBCUTANEOUS at 12:29

## 2020-05-01 RX ADMIN — Medication 166.67 MILLIGRAM(S): at 17:02

## 2020-05-01 NOTE — PROGRESS NOTE ADULT - ASSESSMENT
66 y/o male PMHx  chronic Afib s/p Watchman's device (4/19), HTN, prior CVAs, ETOH abuse, smoker, COPD, HFpEF, Cirrhosis, s/p L BKA admitted on 4/18 with acute COPD exacerbation, with hospital course complicated by ETOH withdrawal, delirium tremens, acute on chronic hypercapnic respiratory failure necessitating emergent intubation, AFib with RVR. Now with severe sepsis secondary to MRSA and CRE Pseudomonas pneumonia.      Neuro: Toxic metabolic encephalopathy, multifactorial secondary to severe sepsis and DTs, as well as necessary treatment with benzodiazepines for active alcohol withdrawal with hallucinations. Ammonia level normal. Sedated on Propofol gtt, transitioned to Precedex today for SAT. Remains deeply sedated, advised RN to wean down Precedex. D/C standing Ativan.     Pulm: Intubated for acute on chronic hypercapnic hypoxemic respiratory failure. Augmenting vent to pH >7.2 and paO2 55-80. Not a candidate for SBT given lack of mentation. Tapering steroids.    CV: Distributive shock initially resolved, developed recurrent shock today started back on Levophed. Afib RVR in the setting of ongoing sepsis, fevers, now with slow ventricular response concurrent with hypotension (likely HR was sustaining CO). Received Digoxin today, check level in AM, d/c PO cardizem for shock and bradycardia HR 50's. Supplementing lytes to K >4, Mg >2 for optimal arrhythmia suppression.     GI: Tube Feeds via NGT in lieu of IV fluids. PPI GI ppx.    Renal: Monitoring renal indices, ATN improving today. Free water for hyperchloremic hypernatremia, improved.     ID: Severe sepsis MRSA and CRE Pseudomonas PNA, on Vancomycin and Cipro, respectively. Vanco trough subtherapeutic, redosed at 1.25g IVPB BID. Will continue to dose by level. Patient has PCN allergy- Called [facility] to check for sensitivity to Colistin as alternate agent. Pending sensitivity testing. Blood cultures thus far negative to date. Still febrile, though Vanco has been subtherapeutic. D/W ID Dr. Best, will continue to monitor.    Heme: Heparin SC for DVT in the setting of ANUSHKA. Continue Aspirin and Plavix

## 2020-05-01 NOTE — PROGRESS NOTE ADULT - SUBJECTIVE AND OBJECTIVE BOX
Patient is a 65y old  Male who presents with a chief complaint of acute hypoxemic, hypercapneic resp failure  COPD exacerbation (01 May 2020 13:33)      BRIEF HOSPITAL COURSE: 66 y/o male PMHx  chronic Afib s/p Watchman's device (4/19), HTN, prior CVAs, ETOH abuse, smoker, COPD, HFpEF, Cirrhosis, s/p L BKA admitted on 4/18 with acute COPD exacerbation. Hospital course complicated by ETOH withdrawal, delirium tremens. Pt was RRT due AMS and respiratory distress. CT head negative for any acute pathology. ABG with acute on chronic hypercapnic respiratory failure. Patient was intubated and then extubated on 4/26. Course complicated by AFib w/ RVR on Cardizem infusion, and ongoing encephalopathy. Intubated urgently 4/29 AM for worsening mental status, obtundation, inability to protect airway.      Events last 24 hours: Remains intubated on full vent support. Developed recurrent shock requiring initiation of vasopressors. Still febrile >102'F      PAST MEDICAL & SURGICAL HISTORY:  Chronic CHF  COPD without exacerbation  Atrial fibrillation  Presence of Watchman left atrial appendage closure device  Hypertension  GERD (gastroesophageal reflux disease)  Chronic obstructive pulmonary disease (COPD)  Anemia  Falls  Meningitis  Collapsed lung  Alcohol withdrawal  Emphysema of lung  Cirrhosis  CHF (congestive heart failure)  Poor historian  Alcohol abuse  Chronic atrial fibrillation  Unilateral amputation of lower extremity below knee  Presence of Watchman left atrial appendage closure device  S/P BKA (below knee amputation) unilateral, left          Medications:  ciprofloxacin   IVPB 400 milliGRAM(s) IV Intermittent every 12 hours  vancomycin  IVPB 1250 milliGRAM(s) IV Intermittent every 12 hours  digoxin  Injectable 0.125 milliGRAM(s) IV Push daily  diltiazem    Tablet 60 milliGRAM(s) Oral every 6 hours  norepinephrine Infusion 0.05 MICROgram(s)/kG/Min IV Continuous <Continuous>  ALBUTerol    90 MICROgram(s) HFA Inhaler 2 Puff(s) Inhalation every 4 hours  budesonide 160 MICROgram(s)/formoterol 4.5 MICROgram(s) Inhaler 2 Puff(s) Inhalation two times a day  tiotropium 18 MICROgram(s) Capsule 1 Capsule(s) Inhalation daily  acetaminophen   Tablet .. 650 milliGRAM(s) Oral every 6 hours PRN  dexMEDEtomidine Infusion 1 MICROgram(s)/kG/Hr IV Continuous <Continuous>  gabapentin 400 milliGRAM(s) Oral three times a day  LORazepam   Injectable 1 milliGRAM(s) IV Push every 6 hours PRN  aspirin  chewable 81 milliGRAM(s) Oral daily  clopidogrel Tablet 75 milliGRAM(s) Oral daily  heparin   Injectable 5000 Unit(s) SubCutaneous every 8 hours  pantoprazole  Injectable 40 milliGRAM(s) IV Push daily  polyethylene glycol 3350 17 Gram(s) Oral daily PRN  predniSONE   Tablet   Oral   folic acid 1 milliGRAM(s) Oral daily  multivitamin/minerals/iron Oral Solution (CENTRUM) 15 milliLiter(s) Oral daily  thiamine 100 milliGRAM(s) Oral daily  chlorhexidine 0.12% Liquid 15 milliLiter(s) Oral Mucosa every 12 hours        Mode: AC/ CMV (Assist Control/ Continuous Mandatory Ventilation)  RR (machine): 12  TV (machine): 550  FiO2: 40  PEEP: 5  ITime: 0.1  MAP: 11  PIP: 24      ICU Vital Signs Last 24 Hrs  T(C): 37.5 (01 May 2020 16:00), Max: 39.4 (01 May 2020 12:00)  T(F): 99.5 (01 May 2020 16:00), Max: 103 (01 May 2020 13:00)  HR: 59 (01 May 2020 17:00) (55 - 119)  BP: 140/64 (01 May 2020 17:00) (63/45 - 140/64)  BP(mean): 82 (01 May 2020 17:00) (49 - 82)  ABP: --  ABP(mean): --  RR: 15 (01 May 2020 17:00) (15 - 22)  SpO2: 100% (01 May 2020 17:00) (94% - 100%)      ABG - ( 30 Apr 2020 06:26 )  pH, Arterial: 7.48  pH, Blood: x     /  pCO2: 53    /  pO2: 62    / HCO3: 39    / Base Excess: 12.7  /  SaO2: 91                  I&O's Detail    30 Apr 2020 07:01  -  01 May 2020 07:00  --------------------------------------------------------  IN:    Free Water: 1200 mL    ns in tub fed  [id number]: 1200 mL    propofol Infusion: 512.2 mL  Total IN: 2912.2 mL    OUT:    Voided: 1715 mL  Total OUT: 1715 mL    Total NET: 1197.2 mL      01 May 2020 07:01  -  01 May 2020 18:20  --------------------------------------------------------  IN:    Free Water: 600 mL  Total IN: 600 mL    OUT:    Voided: 375 mL  Total OUT: 375 mL    Total NET: 225 mL            LABS:                        16.6   21.69 )-----------( 75       ( 01 May 2020 06:43 )             52.7     05-01    144  |  102  |  61<H>  ----------------------------<  150<H>  3.6   |  38<H>  |  1.00    Ca    8.0<L>      01 May 2020 06:43  Phos  3.1     05-01  Mg     2.9     05-01            CAPILLARY BLOOD GLUCOSE            CULTURES:  Culture Results:   Numerous Methicillin resistant Staphylococcus aureus  Normal Respiratory Faith present (04-29 @ 15:00)  Culture Results:   No growth to date. (04-29 @ 11:29)  Culture Results:   No growth to date. (04-29 @ 11:29)  Culture Results:   No Growth Final (04-26 @ 01:01)  Culture Results:   No Growth Final (04-26 @ 00:56)  Culture Results:   Few Pseudomonas aeruginosa (Carbapenem Resistant)  Moderate Methicillin resistant Staphylococcus aureus  Normal Respiratory Faith present (04-25 @ 01:00)        Physical Examination:    General: Toxic, encephalopathic    HEENT: Pupils equal, reactive to light. Symmetric.    PULM: Diffuse ronchi and expiratory wheezes    CVS: AFib with slow ventricular response    ABD: Softly distended    EXT: Mild generalized anasarca. LLE BKA    SKIN: Warm and well perfused, skin flushed diffusely     NEURO: RASS -5        INVASIVE LINES:  INDWELLING NOVAK: Y   VTE PROPHYLAXIS: heparin SC   CAM ICU: +   CODE STATUS: FULL      CRITICAL CARE TIME SPENT: 47 minutes spent performing frequent bedside reassessments and augmenting plan of care to address problems of acute critical illness that pose high probability of life threatening deterioration and/or end organ damage/dysfunction and discussing goals of care with patient's family, non-inclusive of time spent on procedures performed.

## 2020-05-01 NOTE — PROVIDER CONTACT NOTE (CRITICAL VALUE NOTIFICATION) - TEST AND RESULT REPORTED:
+ sputum cx few pseudomonas aeruginosa carbapenam resistant MRSA, normal respiratory raymundo present

## 2020-05-01 NOTE — PROGRESS NOTE ADULT - SUBJECTIVE AND OBJECTIVE BOX
Date of service: 05-01-20 @ 11:56    Intubated on ventilatory support  Febrile to 101.8F  Lethargic    ROS: unobtainable    MEDICATIONS  (STANDING):  ALBUTerol    90 MICROgram(s) HFA Inhaler 2 Puff(s) Inhalation every 4 hours  aspirin  chewable 81 milliGRAM(s) Oral daily  budesonide 160 MICROgram(s)/formoterol 4.5 MICROgram(s) Inhaler 2 Puff(s) Inhalation two times a day  chlorhexidine 0.12% Liquid 15 milliLiter(s) Oral Mucosa every 12 hours  ciprofloxacin   IVPB 400 milliGRAM(s) IV Intermittent every 12 hours  clopidogrel Tablet 75 milliGRAM(s) Oral daily  dexMEDEtomidine Infusion 1 MICROgram(s)/kG/Hr (29.5 mL/Hr) IV Continuous <Continuous>  digoxin  Injectable 0.125 milliGRAM(s) IV Push daily  diltiazem    Tablet 60 milliGRAM(s) Oral every 6 hours  folic acid 1 milliGRAM(s) Oral daily  gabapentin 400 milliGRAM(s) Oral three times a day  heparin   Injectable 5000 Unit(s) SubCutaneous every 8 hours  LORazepam   Injectable 2 milliGRAM(s) IV Push every 6 hours  multivitamin/minerals/iron Oral Solution (CENTRUM) 15 milliLiter(s) Oral daily  pantoprazole  Injectable 40 milliGRAM(s) IV Push daily  predniSONE   Tablet   Oral   propofol Infusion 40 MICROgram(s)/kG/Min (28.3 mL/Hr) IV Continuous <Continuous>  thiamine 100 milliGRAM(s) Oral daily  tiotropium 18 MICROgram(s) Capsule 1 Capsule(s) Inhalation daily  vancomycin  IVPB 1250 milliGRAM(s) IV Intermittent every 12 hours      Vital Signs Last 24 Hrs  T(C): 38.8 (01 May 2020 09:00), Max: 38.8 (01 May 2020 09:00)  T(F): 101.8 (01 May 2020 09:00), Max: 101.8 (01 May 2020 09:00)  HR: 110 (01 May 2020 10:00) (93 - 119)  BP: 97/60 (01 May 2020 10:00) (92/60 - 125/77)  BP(mean): 69 (01 May 2020 10:00) (68 - 88)  RR: 21 (01 May 2020 10:00) (16 - 22)  SpO2: 97% (01 May 2020 10:00) (94% - 97%)    Mode: AC/ CMV (Assist Control/ Continuous Mandatory Ventilation), RR (machine): 12, TV (machine): 550, FiO2: 40, PEEP: 5, ITime: 0.1, MAP: 14, PIP: 26    Physical exam:    Constitutional:  No acute distress  HEENT: NC/AT, EOMI, PERRLA, conjunctivae clear  Neck: supple; thyroid not palpable  Back: no tenderness  Respiratory: respiratory effort normal; b/l rhonchi  Cardiovascular: S1S2 regular, no murmurs  Abdomen: soft, not tender, not distended, positive BS  Genitourinary: no suprapubic tenderness  Lymphatic: no LN palpable  Musculoskeletal: no muscle tenderness, no joint swelling or tenderness  Extremities: no pedal edema  Neurological/ Psychiatric: confused; moving all extremities  Skin: no rashes; no palpable lesions    Labs: reviewed                        16.6   21.69 )-----------( 75       ( 01 May 2020 06:43 )             52.7     05-01    144  |  102  |  61<H>  ----------------------------<  150<H>  3.6   |  38<H>  |  1.00    Ca    8.0<L>      01 May 2020 06:43  Phos  3.1     05-01  Mg     2.9     05-01    Vancomycin Level, Trough: 11.4 ug/mL (04-30 @ 17:45)  Vancomycin Level, Trough: 39.2 ug/mL (04-30 @ 06:53)                          16.3   17.93 )-----------( 76       ( 30 Apr 2020 06:53 )             51.6     04-30    146<H>  |  104  |  61<H>  ----------------------------<  205<H>  3.1<L>   |  38<H>  |  1.22    Ca    7.8<L>      30 Apr 2020 06:53  Phos  2.8     04-30  Mg     2.8     04-30    Vancomycin Level, Trough: 39.2 ug/mL (04-30 @ 06:53)    C-Reactive Protein, Serum: 1.14 mg/dL (04-19-20 @ 11:33)  C-Reactive Protein, Serum: 1.12 mg/dL (04-18-20 @ 19:43)  D-Dimer Assay, Quantitative: <150 ng/mL DDU (04-18-20 @ 19:43)  Ferritin, Serum: 151 ng/mL (04-18-20 @ 19:43)                        18.0   27.13 )-----------( 126      ( 29 Apr 2020 07:28 )             58.3     04-29    149<H>  |  107  |  67<H>  ----------------------------<  181<H>  4.3   |  37<H>  |  1.58<H>    Ca    8.3<L>      29 Apr 2020 07:28  Phos  6.9     04-29  Mg     2.9     04-29                 17.7   14.05 )-----------( 133      ( 28 Apr 2020 06:48 )             54.5     04-28    154<H>  |  114<H>  |  50<H>  ----------------------------<  172<H>  3.8   |  36<H>  |  1.07    Ca    8.6      28 Apr 2020 06:48  Phos  3.2     04-28  Mg     2.8     04-28    COVID-19 PCR . (04.26.20 @ 00:02)    COVID-19 PCR: NotDete      Culture - Blood (collected 26 Apr 2020 01:01)  Source: .Blood None  Preliminary Report (27 Apr 2020 10:02):    No growth to date.    Culture - Blood (collected 26 Apr 2020 00:56)  Source: .Blood None  Preliminary Report (27 Apr 2020 10:02):    No growth to date.    Culture - Sputum (collected 25 Apr 2020 01:00)  Source: .Sputum Sputum  trap  Gram Stain (26 Apr 2020 12:29):    Moderate polymorphonuclear leukocytes per low power field    No Squamous epithelial cells per low power field    Rare Gram Positive Rods per oil power field    Rare Gram Positive Cocci in Pairs and Chains per oil power field  Final Report (28 Apr 2020 10:53):    Few Pseudomonas aeruginosa (Carbapenem Resistant)    Moderate Methicillin resistant Staphylococcus aureus    Normal Respiratory Faith present  Organism: Pseudomonas aeruginosa (Carbapenem Resistant)  Methicillin resistant Staphylococcus aureus (28 Apr 2020 10:44)  Organism: Pseudomonas aeruginosa (Carbapenem Resistant) (28 Apr 2020 10:44)      -  Amikacin: S <=16      -  Aztreonam: R >16      -  Cefepime: R >16      -  Ceftazidime: R >16      -  Ciprofloxacin: S <=0.25      -  Gentamicin: S 4      -  Imipenem: R >8      -  Levofloxacin: S <=0.5      -  Meropenem: R >8      -  Piperacillin/Tazobactam: R >64      -  Tobramycin: S <=2      Method Type: LADY  Organism: Methicillin resistant Staphylococcus aureus (28 Apr 2020 10:40)      -  Amoxicillin/Clavulanic Acid: R >4/2      -  Ampicillin: R >8      -  Ampicillin/Sulbactam: R <=8/4      -  Cefazolin: R >16      -  Ceftriaxone: R >32      -  Ciprofloxacin: R >2      -  Clindamycin: R >4      -  Daptomycin: S 0.5      -  Erythromycin: R >4      -  Gentamicin: S <=1 Should not be used as monotherapy      -  Levofloxacin: R >4      -  Linezolid: S 4      -  Meropenem: R >8      -  Moxifloxacin(Aerobic): R >4      -  Oxacillin: R >2      -  Penicillin: R >8      -  RIF- Rifampin: S <=1 Should not be used as monotherapy      -  Tetra/Doxy: S 2      -  Trimethoprim/Sulfamethoxazole: S <=0.5/9.5      -  Vancomycin: S 2      Method Type: LADY    Culture - Urine (collected 21 Apr 2020 15:51)  Source: .Urine Clean Catch (Midstream)  Final Report (22 Apr 2020 17:03):    <10,000 CFU/mL Normal Urogenital Faith    Culture - Blood (collected 18 Apr 2020 19:43)  Source: .Blood Blood-Peripheral  Final Report (24 Apr 2020 01:01):    No Growth Final    Radiology: all available radiological tests reviewed    < from: Xray Chest 1 View AP/PA. (04.25.20 @ 10:12) >  Percent of LEFT lung opacification: Clear  Percent of RIGHT lung opacification: Clear  Change in lung opacification from most recent x-ray (<=3 days): Stable  Change from prior dated 3 or more days (same admission): No Prior    < end of copied text >    < from: Xray Chest 1 View-PORTABLE IMMEDIATE (04.29.20 @ 07:36) >  Percent of LEFT lung opacification: %  Percent of RIGHT lung opacification: Clear  Change in lung opacification from most recent x-ray (<=3 days): Increase  Change from prior dated 3or more days (same admission): Increase    < end of copied text >      Advanced directives addressed: full resuscitation

## 2020-05-01 NOTE — PROGRESS NOTE ADULT - SUBJECTIVE AND OBJECTIVE BOX
Subjective:    pat sedated on vent, on iv propophyl, iv lorazepam.    Home Medications:  albuterol 2.5 mg/3 mL (0.083%) inhalation solution: 3 milliliter(s) inhaled every 6 hours, As Needed -for shortness of breath and/or wheezing (19 Apr 2020 03:12)  gabapentin 400 mg oral capsule: 1 cap(s) orally 3 times a day (19 Apr 2020 03:12)  pantoprazole 40 mg oral granule, enteric coated: 40 milligram(s) orally once a day (19 Apr 2020 03:12)  Spiriva 18 mcg inhalation capsule: 1 cap(s) inhaled once a day (19 Apr 2020 03:12)  tamsulosin 0.4 mg oral capsule: 1 cap(s) orally once a day (at bedtime) (19 Apr 2020 03:12)  traZODone 50 mg oral tablet: 2 tab(s) orally once a day (at bedtime), As Needed (19 Apr 2020 03:12)    MEDICATIONS  (STANDING):  ALBUTerol    90 MICROgram(s) HFA Inhaler 2 Puff(s) Inhalation every 4 hours  aspirin  chewable 81 milliGRAM(s) Oral daily  budesonide 160 MICROgram(s)/formoterol 4.5 MICROgram(s) Inhaler 2 Puff(s) Inhalation two times a day  chlorhexidine 0.12% Liquid 15 milliLiter(s) Oral Mucosa every 12 hours  ciprofloxacin   IVPB 400 milliGRAM(s) IV Intermittent every 12 hours  clopidogrel Tablet 75 milliGRAM(s) Oral daily  dexMEDEtomidine Infusion 1 MICROgram(s)/kG/Hr (29.5 mL/Hr) IV Continuous <Continuous>  digoxin  Injectable 0.125 milliGRAM(s) IV Push daily  diltiazem    Tablet 60 milliGRAM(s) Oral every 6 hours  folic acid 1 milliGRAM(s) Oral daily  gabapentin 400 milliGRAM(s) Oral three times a day  heparin   Injectable 5000 Unit(s) SubCutaneous every 8 hours  LORazepam   Injectable 2 milliGRAM(s) IV Push every 6 hours  multivitamin/minerals/iron Oral Solution (CENTRUM) 15 milliLiter(s) Oral daily  norepinephrine Infusion 0.05 MICROgram(s)/kG/Min (11.1 mL/Hr) IV Continuous <Continuous>  pantoprazole  Injectable 40 milliGRAM(s) IV Push daily  predniSONE   Tablet   Oral   propofol Infusion 40 MICROgram(s)/kG/Min (28.3 mL/Hr) IV Continuous <Continuous>  thiamine 100 milliGRAM(s) Oral daily  tiotropium 18 MICROgram(s) Capsule 1 Capsule(s) Inhalation daily  vancomycin  IVPB 1250 milliGRAM(s) IV Intermittent every 12 hours    MEDICATIONS  (PRN):  acetaminophen   Tablet .. 650 milliGRAM(s) Oral every 6 hours PRN Temp greater or equal to 38.5C (101.3F)  polyethylene glycol 3350 17 Gram(s) Oral daily PRN Constipation      Allergies    Ceclor (Rash)  Duricef (Rash)    Intolerances        Vital Signs Last 24 Hrs  T(C): 39.4 (01 May 2020 12:00), Max: 39.4 (01 May 2020 12:00)  T(F): 102.9 (01 May 2020 12:00), Max: 102.9 (01 May 2020 12:00)  HR: 75 (01 May 2020 12:00) (75 - 119)  BP: 111/55 (01 May 2020 12:00) (63/45 - 125/77)  BP(mean): 70 (01 May 2020 12:00) (49 - 88)  RR: 20 (01 May 2020 12:00) (16 - 22)  SpO2: 98% (01 May 2020 12:00) (94% - 98%)  Mode: AC/ CMV (Assist Control/ Continuous Mandatory Ventilation)  RR (machine): 12  TV (machine): 550  FiO2: 40  PEEP: 5  ITime: 0.1  MAP: 14  PIP: 26      PHYSICAL EXAMINATION:    NECK:  Supple. No lymphadenopathy. Jugular venous pressure not elevated. Carotids equal.   HEART:   The cardiac impulse has a normal quality. Reg., Nl S1 and S2.  There are no murmurs, rubs or gallops noted  CHEST:  Chest few rhonchi to auscultation. Normal respiratory effort.  ABDOMEN:  Soft and nontender.   EXTREMITIES:  There is no edema.       LABS:                        16.6   21.69 )-----------( 75       ( 01 May 2020 06:43 )             52.7     05-01    144  |  102  |  61<H>  ----------------------------<  150<H>  3.6   |  38<H>  |  1.00    Ca    8.0<L>      01 May 2020 06:43  Phos  3.1     05-01  Mg     2.9     05-01

## 2020-05-01 NOTE — PROGRESS NOTE ADULT - ASSESSMENT
· Assessment		  65 year old male w chronic AFib w Watchman device, CHF, COPD, HTN, alcohol abuse, obesity came to ED w EMS c/o SOB since the morning of 04-18.  Per EMS, pt was in rapid afib in 180s upon EMS arrival with O2 sat in 80 acute respiratory failure with hypercapnia due to COPD exacerbation .    Neurology called after episode of altered mental status and slurred speech on 4/22.    Hospital course complicated by ETOH withdrawal.  Patient reintubated 4/29 with worsening mental status and respiratory distress.    -Earlier in hospital course patient had CT head which was negative for stroke and EEG suggestive of encephalopathy.  -AMS multifactorial including hypoxic encephalopathy and toxic metabolic encephalopathy as well as ETOH withdrawal  -Now being treated for probable pneumonia with MRSA.  -AF with RVR. Has watchman procedure and not on anticoagulation    At this time he remains sedated making neuro exam difficult.  He has evidence of brainstem reflexes. However, it is unclear if poor mental status is related to encephalopathy or other neurological insult or to sedation.    Dr. Napoles will take over coverage of neurology service on 5/2/20. Please call if needed.

## 2020-05-01 NOTE — PROGRESS NOTE ADULT - SUBJECTIVE AND OBJECTIVE BOX
REASON FOR VISIT: AF, Rx management    HPI:  65 year old man with a history of chronic AF, Watchman device (implanted 4/2019), HTN, CVAs, alcoholism, tobacco abuse, COPD, HFpEF (60% 12/2018), GERD, cirrhosis, left BKA following traumatic injury admitted on 4/18/2020 with  acute on chronic hypercapnic & hypoxemic respiratory failure due to suspected exacerbation of COPD and/or bronchitis.  His hospital course has been complicated by encephalopathy and recurrent hypoxia / respiratory failure.    4/21/20 Patient complain of wheezing ,no sob at rest , on 4 L NC O2  despite of being on IV lasix ,  heart rate is 80 to 100s    4/22/2020:  "I'm still wheezing."  No new complaints.  4/23/2020:  Overnight events noted and case discussed with Dr Crews; sedated on vent in CICU.  4/24/20  Patient is remain intubated , his heart rate is controlled , rectus sheath hematoma without significant drop in hemoglobin level ,   4/25/20: no real changes from yesterday and remains intubated and sedated.   4/26/20: Mental status not improved and now extubated.  In rapid atrial fib AM.  Cannot take po due to mental status.    4/27/20: Mental status still not improved enough to take medication oral and despite maximum cardizem   4/28/20 Patient is lethargic arousble confused , hypertensive , patient had low grade fever did  receive IV Tylenol , patient heart rate is better after starting him on digoxin , and on maximum cardizem drip patient hypernatremic , poor oral intake    4/29/20: Hypoxic this AM w/ agonal respirations, emergently intubated.  4/30/20:  Sedated on ventilator.  5/1/2020:  Sedated with propofol.      MEDICATIONS  (STANDING):  ALBUTerol    90 MICROgram(s) HFA Inhaler 2 Puff(s) Inhalation every 4 hours  aspirin  chewable 81 milliGRAM(s) Oral daily  budesonide 160 MICROgram(s)/formoterol 4.5 MICROgram(s) Inhaler 2 Puff(s) Inhalation two times a day  chlorhexidine 0.12% Liquid 15 milliLiter(s) Oral Mucosa every 12 hours  ciprofloxacin   IVPB 400 milliGRAM(s) IV Intermittent every 12 hours  clopidogrel Tablet 75 milliGRAM(s) Oral daily  dexMEDEtomidine Infusion 1 MICROgram(s)/kG/Hr (29.5 mL/Hr) IV Continuous <Continuous>  digoxin  Injectable 0.125 milliGRAM(s) IV Push daily  diltiazem    Tablet 60 milliGRAM(s) Oral every 6 hours  folic acid 1 milliGRAM(s) Oral daily  gabapentin 400 milliGRAM(s) Oral three times a day  heparin   Injectable 5000 Unit(s) SubCutaneous every 8 hours  LORazepam   Injectable 2 milliGRAM(s) IV Push every 6 hours  multivitamin/minerals/iron Oral Solution (CENTRUM) 15 milliLiter(s) Oral daily  pantoprazole  Injectable 40 milliGRAM(s) IV Push daily  predniSONE   Tablet   Oral   propofol Infusion 40 MICROgram(s)/kG/Min (28.3 mL/Hr) IV Continuous <Continuous>  thiamine 100 milliGRAM(s) Oral daily  tiotropium 18 MICROgram(s) Capsule 1 Capsule(s) Inhalation daily  vancomycin  IVPB 1250 milliGRAM(s) IV Intermittent every 12 hours    MEDICATIONS  (PRN):  acetaminophen   Tablet .. 650 milliGRAM(s) Oral every 6 hours PRN Temp greater or equal to 38.5C (101.3F)  polyethylene glycol 3350 17 Gram(s) Oral daily PRN Constipation    Vital Signs Last 24 Hrs  T(C): 37.4 (01 May 2020 04:39), Max: 38.1 (30 Apr 2020 13:00)  T(F): 99.4 (01 May 2020 04:39), Max: 100.6 (30 Apr 2020 13:00)  HR: 110 (01 May 2020 07:00) (92 - 112)  BP: 105/66 (01 May 2020 07:00) (92/60 - 125/77)  BP(mean): 74 (01 May 2020 07:00) (68 - 88)  RR: 18 (01 May 2020 07:00) (16 - 21)  SpO2: 95% (01 May 2020 03:00) (92% - 97%)    PHYSICAL EXAM:  Constitutional: intubated, sedated, semirecumbent  Respiratory: ETT to vent  Cardiovascular: Irregular, HR ~ 100 at time of exam  Gastrointestinal: Abdomen is soft.   Extremities: No peripheral edema.    LABS:                  16.6   21.69 )-----------( 75       ( 01 May 2020 06:43 )             52.7     146<H>  |  104  |  61<H>  ----------------------------<  205<H>  3.1<L>   |  38<H>  |  1.22    Ca    7.8<L>      30 Apr 2020 06:53  Phos  2.8     04-30  Mg     2.8     04-30    Transthoracic Echocardiogram (06.10.19 @ 20:49):   Estimated left ventricular ejection fraction is 60-65 %. The left ventricle is normal in wall thickness, wall motion and contractility as seen in limited views.   The left ventricle cavity is moderately dilated.   The left atrium is severely dilated.   The right atrium appears severely dilated.   Normal appearing right ventricle structure and function.   Moderate (2+) mitral regurgitation.   Mild to Moderate Tricuspid regurgitation.     US Duplex Venous Lower Ext Complete, Bilateral (04.19.20 @ 16:17):  No evidence of deep venous thrombosis in either lower extremity.    Tele: AF     CT Brain Stroke Protocol (04.22.20 @ 20:29):  No acute hemorrhage, mass effect or extra-axial collections.    CT Head No Cont (04.29.20 @ 12:17):   1.  No acute intracranial hemorrhage, extra-axial collection, hydrocephalus, midline shift or space-occupying mass lesion.  2.  No loss of gray-white junction or wedge-shaped infarct to suggest recent ischemic changes.  3.  Slightly greater fullness of the bodies of lateral ventricles compared with prior without evidence of hydrocephalus at this time.  4. No interval change in chronic and incidental findings.    CT Abdomen and Pelvis w/wo IV Cont (04.22.20 @ 09:09) >  1.  3 mm nonobstructing stone in the left ureterovesicular junction.  2.  7 mm nonobstructing stone in the lower pole right kidney.  3.  Subacute hemorrhage in the right rectus abdominis along the anterior sheath, measures 1.6 cm in thickness and extends from the umbilicus to the inferior myotendinous junction.  4.  Cirrhosis. Mild splenomegaly is new from the prior and suggestive of portal venous hypertension.

## 2020-05-01 NOTE — PROGRESS NOTE ADULT - ASSESSMENT
66 y/o M with a h/o Afib s/p Watchman's device (4/19), HTN, prior CVAs, ETOH abuse, smoker, COPD, HFpEF, Cirrhosis, s/p L BKA, with acute hypercapnic respiratory failure, acute COPD exacerbation, MRSA/CRE pseudomonas pneumonia, septic shock, AF with RVR, EtOH withdrawal.    - emergently reintubated yesterday, actively titrating ventilator settings to maintain SaO2 > 90%, will attempt to wean sedation and perform spontaneous breathing trial, however, expect significant encephalopathy will preclude extubation, consider transitioning propofol to dexmedetomidine to lighten sedation load    - continue inhaled bronchodilators, wean steroids    - MRSA continues to grow in second sputum culture, vancomycin trough was low, dose was increased, continue ciprofloxacin, ID is following    - weaned from norepinephrine infusion although BPs are soft, will reinitiate as necessary to maintain a MAP > 65    - still tachycardic but HR more controlled on PO diltiazem and digoxin, weaned from diltiazem infusion, will escalate PO dose as hemodynamically tolerated

## 2020-05-01 NOTE — PROGRESS NOTE ADULT - ASSESSMENT
PROBLEMS;    Ac on chronic hypercapnic & hypoxamic respiratory failure-s/p extubation-worsening ventilation  sputum-Few Pseudomonas aeruginosa (Carbapenem Resistant)/Moderate Methicillin resistant Staphylococcus aureus  afib with RVR  OHS/VIJAY  AC flare of COPD/chronic vs acute on chronic bronchitis  Mild interstitial lung disease-NSIP h/o smoking  COVID negativex2  Pulmonary hypertension  Nonobstructing stone in the left ureterovesicular junction/ nonobstructing stone in the lower pole right kidney.  Subacute hemorrhage in the right rectus abdominis along the anterior sheath, measures 1.6 cm in thickness and extends from the umbilicus to the inferior myotendinous junction  Cirrhosis  Mild splenomegaly-portal venous hypertension.  Chronic afib w Watchman device  CHF  BPH  GERD  ETOH abuse  seizures disorder    PLAN;    vent support-weaning as tolerated  po prednisone  change iv propophyl to iv percedex  off iv cardizem   off pressors  iv vanco/iv cipr0  aerosols  supportive care  covid repeat testing-neg  d/w staff

## 2020-05-01 NOTE — PROGRESS NOTE ADULT - SUBJECTIVE AND OBJECTIVE BOX
Interval History:  5/1/20: Patient is febrile this AM.      MEDICATIONS  (STANDING):  ALBUTerol    90 MICROgram(s) HFA Inhaler 2 Puff(s) Inhalation every 4 hours  aspirin  chewable 81 milliGRAM(s) Oral daily  budesonide 160 MICROgram(s)/formoterol 4.5 MICROgram(s) Inhaler 2 Puff(s) Inhalation two times a day  chlorhexidine 0.12% Liquid 15 milliLiter(s) Oral Mucosa every 12 hours  ciprofloxacin   IVPB 400 milliGRAM(s) IV Intermittent every 12 hours  clopidogrel Tablet 75 milliGRAM(s) Oral daily  dexMEDEtomidine Infusion 1 MICROgram(s)/kG/Hr (29.5 mL/Hr) IV Continuous <Continuous>  digoxin  Injectable 0.125 milliGRAM(s) IV Push daily  diltiazem    Tablet 60 milliGRAM(s) Oral every 6 hours  folic acid 1 milliGRAM(s) Oral daily  gabapentin 400 milliGRAM(s) Oral three times a day  heparin   Injectable 5000 Unit(s) SubCutaneous every 8 hours  LORazepam   Injectable 2 milliGRAM(s) IV Push every 6 hours  multivitamin/minerals/iron Oral Solution (CENTRUM) 15 milliLiter(s) Oral daily  pantoprazole  Injectable 40 milliGRAM(s) IV Push daily  predniSONE   Tablet   Oral   propofol Infusion 40 MICROgram(s)/kG/Min (28.3 mL/Hr) IV Continuous <Continuous>  thiamine 100 milliGRAM(s) Oral daily  tiotropium 18 MICROgram(s) Capsule 1 Capsule(s) Inhalation daily  vancomycin  IVPB 1250 milliGRAM(s) IV Intermittent every 12 hours    MEDICATIONS  (PRN):  acetaminophen   Tablet .. 650 milliGRAM(s) Oral every 6 hours PRN Temp greater or equal to 38.5C (101.3F)  polyethylene glycol 3350 17 Gram(s) Oral daily PRN Constipation      Allergies    Ceclor (Rash)  Duricef (Rash)    Intolerances        PHYSICAL EXAM:  Vital Signs Last 24 Hrs  T(F): 101.8 (05-01-20 @ 09:00)  HR: 119 (05-01-20 @ 09:00)  BP: 124/68 (05-01-20 @ 09:00)  RR: 22 (05-01-20 @ 09:00)    GENERAL: NAD, well-groomed, well-developed  HEAD:  Atraumatic, Normocephalic  Neuro:  Intubated on ventilator. On Precedex  Pupils equally round and reactive to light.   Neg oculocephalics  + corneals  No gag  No withdrawal to noxious stimuli    LABS:                        16.6   21.69 )-----------( 75       ( 01 May 2020 06:43 )             52.7     05-01    144  |  102  |  61<H>  ----------------------------<  150<H>  3.6   |  38<H>  |  1.00    Ca    8.0<L>      01 May 2020 06:43  Phos  3.1     05-01  Mg     2.9     05-01            RADIOLOGY & ADDITIONAL STUDIES:  CT Head  < from: CT Brain Stroke Protocol (04.22.20 @ 20:29) >  Impression:    No acute hemorrhage, mass effect or extra-axial collections.      < from: CT Angio Neck w/ IV Cont (04.22.20 @ 21:58) >  IMPRESSION:  Patent cervical and intracranial major arterial vasculature without evidence of abrupt vessel cut off, hemodynamically significant stenosis, aneurysm, or dissection.    EEG :   < from: EEG Awake and Asleep (04.23.20 @ 10:30) >  Impression:  This is an mild abnormal EEG due to the presence of  1. Mild generalized background slowing may be on the basis of underlying cerebral dysfunction may be on the basis of underlying metabolic, toxic or structural pathology or due to sedation.  2. Excess beta activity noted related to medication effect. Clinical correlation recommended.  3. No epileptiform activity noted. Clinical correlation recommended.  Repeat study without sedation if clinically indicated.

## 2020-05-01 NOTE — PROGRESS NOTE ADULT - ASSESSMENT
64 y/o male with h/o chronic A.Fib with Watchman device, CHF, COPD, HTN, obesity was admitted on 4/18 for worsening SOB. In ER he was found with rapid A.fib and was placed on NRB. He tested COVID-19 PCR negative x 3. Noted with hypercapnea and placed on BiPAP, then intubated and placed on ventilatory support. He was extubated on 4/26. He is reported with MDRO's in sputum c/s. He was treated with azithromycin from 4/20 to 4/25.     1. Acute respiratory failure. Febrile syndrome. 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 Allergy to PCN and cephalosporines.  -fever is persistent  -leukocytosis is worse  -respiratory poor  -encephalopathy  -cultures reviewed  -on vancomycin 1 gm IV q12h and cipro 400 mg IV q12h # 4  -tolerating abx well so far; no side effects noted  -vancomycin trough level is therapeutic  -respiratory care  -continue IV abx coverage   -monitor temps  -f/u CBC  -supportive care  2. Other issues:   -care per medicine

## 2020-05-02 LAB
ANION GAP SERPL CALC-SCNC: 5 MMOL/L — SIGNIFICANT CHANGE UP (ref 5–17)
BUN SERPL-MCNC: 45 MG/DL — HIGH (ref 7–23)
CALCIUM SERPL-MCNC: 8.1 MG/DL — LOW (ref 8.5–10.1)
CHLORIDE SERPL-SCNC: 104 MMOL/L — SIGNIFICANT CHANGE UP (ref 96–108)
CO2 SERPL-SCNC: 34 MMOL/L — HIGH (ref 22–31)
CREAT SERPL-MCNC: 0.75 MG/DL — SIGNIFICANT CHANGE UP (ref 0.5–1.3)
DIGOXIN SERPL-MCNC: 0.75 NG/ML — LOW (ref 0.8–2)
GLUCOSE SERPL-MCNC: 242 MG/DL — HIGH (ref 70–99)
HCT VFR BLD CALC: 48.8 % — SIGNIFICANT CHANGE UP (ref 39–50)
HGB BLD-MCNC: 15.9 G/DL — SIGNIFICANT CHANGE UP (ref 13–17)
MAGNESIUM SERPL-MCNC: 2.4 MG/DL — SIGNIFICANT CHANGE UP (ref 1.6–2.6)
MCHC RBC-ENTMCNC: 32.3 PG — SIGNIFICANT CHANGE UP (ref 27–34)
MCHC RBC-ENTMCNC: 32.6 GM/DL — SIGNIFICANT CHANGE UP (ref 32–36)
MCV RBC AUTO: 99 FL — SIGNIFICANT CHANGE UP (ref 80–100)
PHOSPHATE SERPL-MCNC: 2.3 MG/DL — LOW (ref 2.5–4.5)
PLATELET # BLD AUTO: 69 K/UL — LOW (ref 150–400)
POTASSIUM SERPL-MCNC: 3.6 MMOL/L — SIGNIFICANT CHANGE UP (ref 3.5–5.3)
POTASSIUM SERPL-SCNC: 3.6 MMOL/L — SIGNIFICANT CHANGE UP (ref 3.5–5.3)
RBC # BLD: 4.93 M/UL — SIGNIFICANT CHANGE UP (ref 4.2–5.8)
RBC # FLD: 12.7 % — SIGNIFICANT CHANGE UP (ref 10.3–14.5)
SODIUM SERPL-SCNC: 143 MMOL/L — SIGNIFICANT CHANGE UP (ref 135–145)
VANCOMYCIN TROUGH SERPL-MCNC: 10.1 UG/ML — SIGNIFICANT CHANGE UP (ref 10–20)
VANCOMYCIN TROUGH SERPL-MCNC: 26.9 UG/ML — CRITICAL HIGH (ref 10–20)
WBC # BLD: 18.43 K/UL — HIGH (ref 3.8–10.5)
WBC # FLD AUTO: 18.43 K/UL — HIGH (ref 3.8–10.5)

## 2020-05-02 PROCEDURE — 99291 CRITICAL CARE FIRST HOUR: CPT

## 2020-05-02 PROCEDURE — 99233 SBSQ HOSP IP/OBS HIGH 50: CPT

## 2020-05-02 RX ORDER — MIDODRINE HYDROCHLORIDE 2.5 MG/1
10 TABLET ORAL EVERY 8 HOURS
Refills: 0 | Status: DISCONTINUED | OUTPATIENT
Start: 2020-05-02 | End: 2020-05-11

## 2020-05-02 RX ADMIN — GABAPENTIN 400 MILLIGRAM(S): 400 CAPSULE ORAL at 05:17

## 2020-05-02 RX ADMIN — Medication 1 MILLIGRAM(S): at 07:54

## 2020-05-02 RX ADMIN — Medication 650 MILLIGRAM(S): at 08:44

## 2020-05-02 RX ADMIN — DEXMEDETOMIDINE HYDROCHLORIDE IN 0.9% SODIUM CHLORIDE 29.5 MICROGRAM(S)/KG/HR: 4 INJECTION INTRAVENOUS at 16:45

## 2020-05-02 RX ADMIN — DEXMEDETOMIDINE HYDROCHLORIDE IN 0.9% SODIUM CHLORIDE 29.5 MICROGRAM(S)/KG/HR: 4 INJECTION INTRAVENOUS at 17:22

## 2020-05-02 RX ADMIN — Medication 0.12 MILLIGRAM(S): at 11:36

## 2020-05-02 RX ADMIN — Medication 100 MILLIGRAM(S): at 11:36

## 2020-05-02 RX ADMIN — Medication 200 MILLIGRAM(S): at 05:13

## 2020-05-02 RX ADMIN — Medication 15 MILLILITER(S): at 11:36

## 2020-05-02 RX ADMIN — HEPARIN SODIUM 5000 UNIT(S): 5000 INJECTION INTRAVENOUS; SUBCUTANEOUS at 13:28

## 2020-05-02 RX ADMIN — Medication 1 MILLIGRAM(S): at 11:36

## 2020-05-02 RX ADMIN — DEXMEDETOMIDINE HYDROCHLORIDE IN 0.9% SODIUM CHLORIDE 29.5 MICROGRAM(S)/KG/HR: 4 INJECTION INTRAVENOUS at 05:13

## 2020-05-02 RX ADMIN — Medication 10 MILLIGRAM(S): at 05:16

## 2020-05-02 RX ADMIN — HEPARIN SODIUM 5000 UNIT(S): 5000 INJECTION INTRAVENOUS; SUBCUTANEOUS at 05:14

## 2020-05-02 RX ADMIN — Medication 166.67 MILLIGRAM(S): at 18:40

## 2020-05-02 RX ADMIN — MIDODRINE HYDROCHLORIDE 10 MILLIGRAM(S): 2.5 TABLET ORAL at 13:29

## 2020-05-02 RX ADMIN — DEXMEDETOMIDINE HYDROCHLORIDE IN 0.9% SODIUM CHLORIDE 29.5 MICROGRAM(S)/KG/HR: 4 INJECTION INTRAVENOUS at 07:54

## 2020-05-02 RX ADMIN — Medication 166.67 MILLIGRAM(S): at 05:13

## 2020-05-02 RX ADMIN — Medication 200 MILLIGRAM(S): at 17:23

## 2020-05-02 RX ADMIN — MIDODRINE HYDROCHLORIDE 10 MILLIGRAM(S): 2.5 TABLET ORAL at 05:14

## 2020-05-02 RX ADMIN — CHLORHEXIDINE GLUCONATE 15 MILLILITER(S): 213 SOLUTION TOPICAL at 05:13

## 2020-05-02 RX ADMIN — GABAPENTIN 400 MILLIGRAM(S): 400 CAPSULE ORAL at 13:28

## 2020-05-02 RX ADMIN — GABAPENTIN 400 MILLIGRAM(S): 400 CAPSULE ORAL at 21:21

## 2020-05-02 RX ADMIN — DEXMEDETOMIDINE HYDROCHLORIDE IN 0.9% SODIUM CHLORIDE 29.5 MICROGRAM(S)/KG/HR: 4 INJECTION INTRAVENOUS at 13:29

## 2020-05-02 RX ADMIN — HEPARIN SODIUM 5000 UNIT(S): 5000 INJECTION INTRAVENOUS; SUBCUTANEOUS at 21:23

## 2020-05-02 RX ADMIN — MIDODRINE HYDROCHLORIDE 10 MILLIGRAM(S): 2.5 TABLET ORAL at 21:24

## 2020-05-02 RX ADMIN — Medication 1 MILLIGRAM(S): at 01:06

## 2020-05-02 RX ADMIN — PANTOPRAZOLE SODIUM 40 MILLIGRAM(S): 20 TABLET, DELAYED RELEASE ORAL at 11:36

## 2020-05-02 RX ADMIN — DEXMEDETOMIDINE HYDROCHLORIDE IN 0.9% SODIUM CHLORIDE 29.5 MICROGRAM(S)/KG/HR: 4 INJECTION INTRAVENOUS at 18:40

## 2020-05-02 RX ADMIN — Medication 1 MILLIGRAM(S): at 22:11

## 2020-05-02 RX ADMIN — Medication 81 MILLIGRAM(S): at 11:36

## 2020-05-02 RX ADMIN — CLOPIDOGREL BISULFATE 75 MILLIGRAM(S): 75 TABLET, FILM COATED ORAL at 11:36

## 2020-05-02 RX ADMIN — DEXMEDETOMIDINE HYDROCHLORIDE IN 0.9% SODIUM CHLORIDE 29.5 MICROGRAM(S)/KG/HR: 4 INJECTION INTRAVENOUS at 15:01

## 2020-05-02 RX ADMIN — Medication 1 MILLIGRAM(S): at 15:02

## 2020-05-02 RX ADMIN — CHLORHEXIDINE GLUCONATE 15 MILLILITER(S): 213 SOLUTION TOPICAL at 17:23

## 2020-05-02 RX ADMIN — DEXMEDETOMIDINE HYDROCHLORIDE IN 0.9% SODIUM CHLORIDE 29.5 MICROGRAM(S)/KG/HR: 4 INJECTION INTRAVENOUS at 10:27

## 2020-05-02 NOTE — PROGRESS NOTE ADULT - SUBJECTIVE AND OBJECTIVE BOX
Patient is a 65y old  Male who presents with a chief complaint of acute hypoxemic, hypercapneic resp failure  COPD exacerbation (01 May 2020 18:20)      BRIEF HOSPITAL COURSE: 66 y/o M with a h/o Afib s/p Watchman's device (4/19), HTN, prior CVAs, ETOH abuse, smoker, COPD, HFpEF, Cirrhosis, s/p L BKA, admitted on 4/18 with acute hypercapnic respiratory failure secondary to COPD exacerbation. Required endotracheal intubation and was extubated on 4/26, however required re-intubation on 4/29 for recurrent respiratory failure. Hospital course complicated by AF with RVR, EtOH withdrawal, septic shock, and MRSA/CRE pseudomonas pneumonia,    Events last 24 hours: Patient remains requiring full mechanical ventilator support. Sedated on dexmedetomidine infusion. HR well controlled. Restarted on IV vasopressor support for recurrent shock state.        PAST MEDICAL & SURGICAL HISTORY:  Chronic CHF  COPD without exacerbation  Atrial fibrillation  Presence of Watchman left atrial appendage closure device  Hypertension  GERD (gastroesophageal reflux disease)  Chronic obstructive pulmonary disease (COPD)  Anemia  Falls  Meningitis  Collapsed lung  Alcohol withdrawal  Emphysema of lung  Cirrhosis  CHF (congestive heart failure)  Poor historian  Alcohol abuse  Chronic atrial fibrillation  Unilateral amputation of lower extremity below knee  Presence of Watchman left atrial appendage closure device  S/P BKA (below knee amputation) unilateral, left      Review of Systems:  Unable to obtain secondary to sedation/intubation      Medications:  ciprofloxacin   IVPB 400 milliGRAM(s) IV Intermittent every 12 hours  vancomycin  IVPB 1250 milliGRAM(s) IV Intermittent every 12 hours  digoxin  Injectable 0.125 milliGRAM(s) IV Push daily  midodrine 10 milliGRAM(s) Oral every 8 hours  norepinephrine Infusion 0.05 MICROgram(s)/kG/Min IV Continuous <Continuous>  ALBUTerol    90 MICROgram(s) HFA Inhaler 2 Puff(s) Inhalation every 4 hours  budesonide 160 MICROgram(s)/formoterol 4.5 MICROgram(s) Inhaler 2 Puff(s) Inhalation two times a day  tiotropium 18 MICROgram(s) Capsule 1 Capsule(s) Inhalation daily  acetaminophen   Tablet .. 650 milliGRAM(s) Oral every 6 hours PRN  dexMEDEtomidine Infusion 1 MICROgram(s)/kG/Hr IV Continuous <Continuous>  gabapentin 400 milliGRAM(s) Oral three times a day  LORazepam   Injectable 1 milliGRAM(s) IV Push every 6 hours PRN  aspirin  chewable 81 milliGRAM(s) Oral daily  clopidogrel Tablet 75 milliGRAM(s) Oral daily  heparin   Injectable 5000 Unit(s) SubCutaneous every 8 hours  pantoprazole  Injectable 40 milliGRAM(s) IV Push daily  polyethylene glycol 3350 17 Gram(s) Oral daily PRN  predniSONE   Tablet   Oral   predniSONE   Tablet 10 milliGRAM(s) Oral daily  folic acid 1 milliGRAM(s) Oral daily  multivitamin/minerals/iron Oral Solution (CENTRUM) 15 milliLiter(s) Oral daily  thiamine 100 milliGRAM(s) Oral daily  chlorhexidine 0.12% Liquid 15 milliLiter(s) Oral Mucosa every 12 hours        Mode: AC/ CMV (Assist Control/ Continuous Mandatory Ventilation)  RR (machine): 12  TV (machine): 550  FiO2: 40  PEEP: 5  ITime: 0.1  MAP: 11  PIP: 24      ICU Vital Signs Last 24 Hrs  T(C): 37.8 (02 May 2020 01:51), Max: 39.4 (01 May 2020 12:00)  T(F): 100 (02 May 2020 01:51), Max: 103 (01 May 2020 13:00)  HR: 78 (02 May 2020 02:00) (55 - 119)  BP: 132/70 (02 May 2020 02:00) (63/45 - 142/72)  BP(mean): 85 (02 May 2020 02:00) (49 - 123)  ABP: --  ABP(mean): --  RR: 21 (02 May 2020 02:00) (15 - 23)  SpO2: 94% (02 May 2020 02:00) (94% - 100%)      ABG - ( 30 Apr 2020 06:26 )  pH, Arterial: 7.48  pH, Blood: x     /  pCO2: 53    /  pO2: 62    / HCO3: 39    / Base Excess: 12.7  /  SaO2: 91                  I&O's Detail    30 Apr 2020 07:01  -  01 May 2020 07:00  --------------------------------------------------------  IN:    Free Water: 1200 mL    ns in tub fed  [id number]: 1200 mL    propofol Infusion: 512.2 mL  Total IN: 2912.2 mL    OUT:    Voided: 1715 mL  Total OUT: 1715 mL    Total NET: 1197.2 mL      01 May 2020 07:01  -  02 May 2020 03:58  --------------------------------------------------------  IN:    dexmedetomidine Infusion: 229 mL    Free Water: 600 mL    IV PiggyBack: 450 mL    norepinephrine Infusion: 65 mL    ns in tub fed  [id number]: 600 mL  Total IN: 1944 mL    OUT:    Voided: 525 mL  Total OUT: 525 mL    Total NET: 1419 mL            LABS:                        16.6   21.69 )-----------( 75       ( 01 May 2020 06:43 )             52.7     05-01    144  |  102  |  61<H>  ----------------------------<  150<H>  3.6   |  38<H>  |  1.00    Ca    8.0<L>      01 May 2020 06:43  Phos  3.1     05-01  Mg     2.9     05-01            CAPILLARY BLOOD GLUCOSE            CULTURES:  Culture Results:   Numerous Methicillin resistant Staphylococcus aureus  Normal Respiratory Faith present (04-29-20 @ 15:00)  Culture Results:   No growth to date. (04-29-20 @ 11:29)  Culture Results:   No growth to date. (04-29-20 @ 11:29)  Culture Results:   No Growth Final (04-26-20 @ 01:01)  Culture Results:   No Growth Final (04-26-20 @ 00:56)        Physical Examination:    General: No acute distress.  sedated, intubated    HEENT: Pupils equal, reactive to light.  Symmetric.    PULM: diminished bilaterally, no significant sputum production    CVS: reg rate, irregularly irregular rhythm, no murmurs, rubs, or gallops    ABD: Soft, nondistended, nontender, normoactive bowel sounds, no masses    EXT: No edema, nontender    SKIN: Warm and well perfused, no rashes noted.    NEURO: sedated, moves all extremities spontaneously        RADIOLOGY:     < from: Xray Chest 1 View- PORTABLE-Routine (05.01.20 @ 09:35) >  Findings:    Endotracheal tube and gastric tube are noted.    Lungs: There are bilateral lower lung lung opacities, decreased from previous study.  Heart: There is cardiomegaly.  Mediastinum: The mediastinum is within normal limits.    Impression:  1.  Decreasedlung opacities.          CRITICAL CARE TIME SPENT: 31 mins  Time spent evaluating/treating patient with medical issues that pose a high risk for life threatening deterioration and/or end-organ damage, reviewing data/labs/imaging, discussing case with multidisciplinary team, discussing plan/goals of care with patient/family. Non-inclusive of procedure time.

## 2020-05-02 NOTE — PROGRESS NOTE ADULT - SUBJECTIVE AND OBJECTIVE BOX
Patient seen at a  this morning for AMS and hypoxic  Patient had been admitted with a COPD exacerbation.  It was believed that he went into ETOH withdrawal and had a total of 6MG Ativan over night.  He required intubation this morning.        4/24: he remains intubated.  Will try to wean this afternoon.      4/25: He weaned this morning and failed with a decreased Tv.  But close to extubation.    4/26: Patient weaned well this morning and was extubated.      5/2: Chart reviewed, Patient was reintubated, failed weaning this morning, CXR with improving infiltrates      PAST MEDICAL HX  Alcohol abuse    Alcohol withdrawal    Anemia    AFIB atrial fibrillation    CHF (congestive heart failure)    Chronic atrial fibrillation    Chronic CHF    Chronic obstructive pulmonary disease (COPD)    Cirrhosis    Collapsed lung    COPD without exacerbation    Emphysema of lung    Falls    GERD (gastroesophageal reflux disease)    HTN hypertension    Meningitis    Poor historian    Presence of Watchman left atrial appendage closure device.    PAST SURGICAL HX  Presence of Watchman left atrial appendage closure device    S/P BKA (below knee amputation) unilateral, left    Unilateral amputation of lower extremity below knee.      FAMILY HX  Family history unknown: Adopted      SOCIAL HX  lives alone  former smoker  alcohol use last 2-3 days ago (19 Apr 2020 03:19)       PAST MEDICAL & SURGICAL HISTORY:  Chronic CHF  COPD without exacerbation  Atrial fibrillation  Presence of Watchman left atrial appendage closure device  Hypertension  GERD (gastroesophageal reflux disease)  Chronic obstructive pulmonary disease (COPD)  Anemia  Falls  Meningitis  Collapsed lung  Alcohol withdrawal  Emphysema of lung  Cirrhosis  CHF (congestive heart failure)  Poor historian  Alcohol abuse  Chronic atrial fibrillation  Unilateral amputation of lower extremity below knee  Presence of Watchman left atrial appendage closure device  S/P BKA (below knee amputation) unilateral, left      FAMILY HISTORY:  No pertinent family history in first degree relatives  Family history unknown: Adopted      Social Hx:    Allergies    Ceclor (Rash)  Duricef (Rash)    Intolerances            ICU Vital Signs Last 24 Hrs  T(C): 37.1 (02 May 2020 14:00), Max: 38.4 (02 May 2020 08:00)  T(F): 98.8 (02 May 2020 14:00), Max: 101.1 (02 May 2020 08:00)  HR: 78 (02 May 2020 15:00) (55 - 93)  BP: 152/69 (02 May 2020 15:00) (94/62 - 152/69)  BP(mean): 87 (02 May 2020 15:00) (60 - 123)  ABP: --  ABP(mean): --  RR: 18 (02 May 2020 15:00) (15 - 23)  SpO2: 96% (02 May 2020 15:00) (90% - 100%)      Mode: AC/ CMV (Assist Control/ Continuous Mandatory Ventilation)  RR (machine): 12  TV (machine): 550  FiO2: 30  PEEP: 5  ITime: 1  MAP: 14  PIP: 28      I&O's Summary    01 May 2020 07:01  -  02 May 2020 07:00  --------------------------------------------------------  IN: 4070 mL / OUT: 2025 mL / NET: 2045 mL                              15.9   18.43 )-----------( 69       ( 02 May 2020 06:13 )             48.8       05-02    143  |  104  |  45<H>  ----------------------------<  242<H>  3.6   |  34<H>  |  0.75    Ca    8.1<L>      02 May 2020 06:13  Phos  2.3     05-02  Mg     2.4     05-02                      MEDICATIONS  (STANDING):  ALBUTerol    90 MICROgram(s) HFA Inhaler 2 Puff(s) Inhalation every 4 hours  aspirin  chewable 81 milliGRAM(s) Oral daily  budesonide 160 MICROgram(s)/formoterol 4.5 MICROgram(s) Inhaler 2 Puff(s) Inhalation two times a day  chlorhexidine 0.12% Liquid 15 milliLiter(s) Oral Mucosa every 12 hours  ciprofloxacin   IVPB 400 milliGRAM(s) IV Intermittent every 12 hours  clopidogrel Tablet 75 milliGRAM(s) Oral daily  dexMEDEtomidine Infusion 1 MICROgram(s)/kG/Hr (29.5 mL/Hr) IV Continuous <Continuous>  digoxin  Injectable 0.125 milliGRAM(s) IV Push daily  folic acid 1 milliGRAM(s) Oral daily  gabapentin 400 milliGRAM(s) Oral three times a day  heparin   Injectable 5000 Unit(s) SubCutaneous every 8 hours  midodrine 10 milliGRAM(s) Oral every 8 hours  multivitamin/minerals/iron Oral Solution (CENTRUM) 15 milliLiter(s) Oral daily  norepinephrine Infusion 0.05 MICROgram(s)/kG/Min (11.1 mL/Hr) IV Continuous <Continuous>  pantoprazole  Injectable 40 milliGRAM(s) IV Push daily  predniSONE   Tablet   Oral   predniSONE   Tablet 10 milliGRAM(s) Oral daily  thiamine 100 milliGRAM(s) Oral daily  tiotropium 18 MICROgram(s) Capsule 1 Capsule(s) Inhalation daily  vancomycin  IVPB 1250 milliGRAM(s) IV Intermittent every 12 hours    MEDICATIONS  (PRN):  acetaminophen   Tablet .. 650 milliGRAM(s) Oral every 6 hours PRN Temp greater or equal to 38.5C (101.3F)  LORazepam   Injectable 1 milliGRAM(s) IV Push every 6 hours PRN Agitation or Anxiety  polyethylene glycol 3350 17 Gram(s) Oral daily PRN Constipation      DVT Prophylaxis:    Advanced Directives:  Discussed with:    Visit Information: 30 min    ** Time is exclusive of billed procedures and/or teaching and/or routine family updates.

## 2020-05-02 NOTE — PROGRESS NOTE ADULT - ASSESSMENT
PROBLEMS;    Ac on chronic hypercapnic & hypoxamic respiratory failure-s/p extubation-worsening ventilation  sputum-Few Pseudomonas aeruginosa (Carbapenem Resistant)/Moderate Methicillin resistant Staphylococcus aureus  afib with RVR  OHS/VIJAY  AC flare of COPD/chronic vs acute on chronic bronchitis  Mild interstitial lung disease-NSIP h/o smoking  COVID negativex2  Pulmonary hypertension  Nonobstructing stone in the left ureterovesicular junction/ nonobstructing stone in the lower pole right kidney.  Subacute hemorrhage in the right rectus abdominis along the anterior sheath, measures 1.6 cm in thickness and extends from the umbilicus to the inferior myotendinous junction  Cirrhosis  Mild splenomegaly-portal venous hypertension.  Chronic afib w Watchman device  CHF  BPH  GERD  ETOH abuse  seizures disorder    PLAN;    vent support-weaning as tolerated  po prednisone  iv percedex  iv levophed-titrate  iv vanco/iv cipr0  aerosols  supportive care  covid repeat testing-neg  d/w staff

## 2020-05-02 NOTE — PROGRESS NOTE ADULT - SUBJECTIVE AND OBJECTIVE BOX
Subjective:    pat on vent, iv percedex & low dose levophed.    Home Medications:  albuterol 2.5 mg/3 mL (0.083%) inhalation solution: 3 milliliter(s) inhaled every 6 hours, As Needed -for shortness of breath and/or wheezing (19 Apr 2020 03:12)  gabapentin 400 mg oral capsule: 1 cap(s) orally 3 times a day (19 Apr 2020 03:12)  pantoprazole 40 mg oral granule, enteric coated: 40 milligram(s) orally once a day (19 Apr 2020 03:12)  Spiriva 18 mcg inhalation capsule: 1 cap(s) inhaled once a day (19 Apr 2020 03:12)  tamsulosin 0.4 mg oral capsule: 1 cap(s) orally once a day (at bedtime) (19 Apr 2020 03:12)  traZODone 50 mg oral tablet: 2 tab(s) orally once a day (at bedtime), As Needed (19 Apr 2020 03:12)    MEDICATIONS  (STANDING):  ALBUTerol    90 MICROgram(s) HFA Inhaler 2 Puff(s) Inhalation every 4 hours  aspirin  chewable 81 milliGRAM(s) Oral daily  budesonide 160 MICROgram(s)/formoterol 4.5 MICROgram(s) Inhaler 2 Puff(s) Inhalation two times a day  chlorhexidine 0.12% Liquid 15 milliLiter(s) Oral Mucosa every 12 hours  ciprofloxacin   IVPB 400 milliGRAM(s) IV Intermittent every 12 hours  clopidogrel Tablet 75 milliGRAM(s) Oral daily  dexMEDEtomidine Infusion 1 MICROgram(s)/kG/Hr (29.5 mL/Hr) IV Continuous <Continuous>  digoxin  Injectable 0.125 milliGRAM(s) IV Push daily  folic acid 1 milliGRAM(s) Oral daily  gabapentin 400 milliGRAM(s) Oral three times a day  heparin   Injectable 5000 Unit(s) SubCutaneous every 8 hours  midodrine 10 milliGRAM(s) Oral every 8 hours  multivitamin/minerals/iron Oral Solution (CENTRUM) 15 milliLiter(s) Oral daily  norepinephrine Infusion 0.05 MICROgram(s)/kG/Min (11.1 mL/Hr) IV Continuous <Continuous>  pantoprazole  Injectable 40 milliGRAM(s) IV Push daily  predniSONE   Tablet   Oral   predniSONE   Tablet 10 milliGRAM(s) Oral daily  thiamine 100 milliGRAM(s) Oral daily  tiotropium 18 MICROgram(s) Capsule 1 Capsule(s) Inhalation daily  vancomycin  IVPB 1250 milliGRAM(s) IV Intermittent every 12 hours    MEDICATIONS  (PRN):  acetaminophen   Tablet .. 650 milliGRAM(s) Oral every 6 hours PRN Temp greater or equal to 38.5C (101.3F)  LORazepam   Injectable 1 milliGRAM(s) IV Push every 6 hours PRN Agitation or Anxiety  polyethylene glycol 3350 17 Gram(s) Oral daily PRN Constipation      Allergies    Ceclor (Rash)  Duricef (Rash)    Intolerances        Vital Signs Last 24 Hrs  T(C): 38.4 (02 May 2020 08:00), Max: 39.4 (01 May 2020 12:00)  T(F): 101.1 (02 May 2020 08:00), Max: 103 (01 May 2020 13:00)  HR: 73 (02 May 2020 11:00) (55 - 93)  BP: 126/51 (02 May 2020 11:00) (80/55 - 142/72)  BP(mean): 66 (02 May 2020 11:00) (59 - 123)  RR: 19 (02 May 2020 11:00) (15 - 23)  SpO2: 96% (02 May 2020 11:00) (90% - 100%)  Mode: AC/ CMV (Assist Control/ Continuous Mandatory Ventilation)  RR (machine): 12  TV (machine): 550  FiO2: 30  PEEP: 5  ITime: 1  MAP: 14  PIP: 28      PHYSICAL EXAMINATION:    NECK:  Supple. No lymphadenopathy. Jugular venous pressure not elevated. Carotids equal.   HEART:   The cardiac impulse has a normal quality. Reg., Nl S1 and S2.  There are no murmurs, rubs or gallops noted  CHEST:  Chest decerase air entry to auscultation. Normal respiratory effort.  ABDOMEN:  Soft and nontender.   EXTREMITIES:  There is no edema.       LABS:                        15.9   18.43 )-----------( 69       ( 02 May 2020 06:13 )             48.8     05-02    143  |  104  |  45<H>  ----------------------------<  242<H>  3.6   |  34<H>  |  0.75    Ca    8.1<L>      02 May 2020 06:13  Phos  2.3     05-02  Mg     2.4     05-02

## 2020-05-02 NOTE — PROGRESS NOTE ADULT - ASSESSMENT
66 y/o male with h/o chronic A.Fib with Watchman device, CHF, COPD, HTN, obesity was admitted on 4/18 for worsening SOB. In ER he was found with rapid A.fib and was placed on NRB. He tested COVID-19 PCR negative x 3. Noted with hypercapnea and placed on BiPAP, then intubated and placed on ventilatory support. He was extubated on 4/26. He is reported with MDRO's in sputum c/s. He was treated with azithromycin from 4/20 to 4/25.     1. Acute respiratory failure. Febrile syndrome. 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 Allergy to PCN and cephalosporines.  -fever is persistent  -leukocytosis is persistent  -encephalopathy  -cultures reviewed; repeat sputum c/s shows MRSA and normal respiratory raymundo  -on vancomycin 1 gm IV q12h and cipro 400 mg IV q12h # 5  -tolerating abx well so far; no side effects noted  -vancomycin trough level is reported high; ?collected during vancomycin infusion  -repeat vancomycin level before PM dose  -respiratory care  -continue IV abx coverage   -repeat BC x 2  -monitor temps  -f/u CBC  -supportive care  2. Other issues:   -care per medicine

## 2020-05-02 NOTE — PROGRESS NOTE ADULT - ASSESSMENT
A/P: 65 male with acute respiratory failure from altered awareness and sedation  COPD  AFIB s/p watchman  CHF  HTN  ETOH abuse      Plan:   CICU    PULM: Failed weaning with aan increased RR-- ABG in AM    Cardio: Wean levo, continue ASA, Plavix,     Renal: Cr. Stable    GI: Feeds, PPI    ID: Vanco MRSA PNA    PSY: Ativan 1mg  ATC, Thiamine for chronic ETOH abuse and thiamine deficiency folate    Lovenox DVT Prophylaxis    D/W the patients daughter

## 2020-05-02 NOTE — PROGRESS NOTE ADULT - ASSESSMENT
66 y/o M with a h/o Afib s/p Watchman's device (4/19), HTN, prior CVAs, ETOH abuse, smoker, COPD, HFpEF, Cirrhosis, s/p L BKA, with acute hypercapnic respiratory failure, acute COPD exacerbation, MRSA/CRE pseudomonas pneumonia, septic shock, AF with RVR, EtOH withdrawal.    - emergently reintubated on 4/29, actively titrating ventilator settings to maintain SaO2 > 90%, transitioned propofol to dexmedetomidine to lighten sedation load in order to optimize for potential extubation, although still remains with significant encephalopathy that is complicating this. Wean sedation off again this morning and will trial on PSV. Favor leaving sedation off if he is comfortable and synchronous with the ventilator.    - continue inhaled bronchodilators, wean steroids    - restarted on norepinephrine infusion for recurrent septic shock, actively titrating to maintain a MAP > 65, add midodrine, CCB was d/c'd as this may have been blunting compensatory response to vasodilatory shock state (noted to have been in slow a-fib at onset of recurrent hypotension)    - MRSA continues to grow in second sputum culture, persistent fever, vancomycin trough was low on 4/30, dose was increased, f/u repeat level today and adjust as necessary, continue ciprofloxacin, ID is following

## 2020-05-02 NOTE — PROGRESS NOTE ADULT - SUBJECTIVE AND OBJECTIVE BOX
PCP:    REQUESTING PHYSICIAN:    REASON FOR CONSULT:    CHIEF COMPLAINT:    HPI:  65 year old man with a history of chronic AF, Watchman device (implanted 2019), HTN, CVAs, alcoholism, tobacco abuse, COPD, HFpEF (60% 2018), GERD, cirrhosis, left BKA following traumatic injury admitted on 2020 with  acute on chronic hypercapnic & hypoxemic respiratory failure due to suspected exacerbation of COPD and/or bronchitis.  His hospital course has been complicated by encephalopathy and recurrent hypoxia / respiratory failure.    20 Patient complain of wheezing ,no sob at rest , on 4 L NC O2  despite of being on IV lasix ,  heart rate is 80 to 100s    2020:  "I'm still wheezing."  No new complaints.  2020:  Overnight events noted and case discussed with Dr Crews; sedated on vent in CICU.  20  Patient is remain intubated , his heart rate is controlled , rectus sheath hematoma without significant drop in hemoglobin level ,   20: no real changes from yesterday and remains intubated and sedated.   20: Mental status not improved and now extubated.  In rapid atrial fib AM.  Cannot take po due to mental status.    20: Mental status still not improved enough to take medication oral and despite maximum cardizem   20 Patient is lethargic arousble confused , hypertensive , patient had low grade fever did  receive IV Tylenol , patient heart rate is better after starting him on digoxin , and on maximum cardizem drip patient hypernatremic , poor oral intake    20: Hypoxic this AM w/ agonal respirations, emergently intubated.  20:  Sedated on ventilator.  2020:  Sedated with propofol.    20: Sedated, intubated. Afib 70s-80s      PAST MEDICAL HX  Alcohol abuse    Alcohol withdrawal    Anemia    AFIB atrial fibrillation    CHF (congestive heart failure)    Chronic atrial fibrillation    Chronic CHF    Chronic obstructive pulmonary disease (COPD)    Cirrhosis    Collapsed lung    COPD without exacerbation    Emphysema of lung    Falls    GERD (gastroesophageal reflux disease)    HTN hypertension    Meningitis    Poor historian    Presence of Watchman left atrial appendage closure device.    PAST SURGICAL HX  Presence of Watchman left atrial appendage closure device    S/P BKA (below knee amputation) unilateral, left    Unilateral amputation of lower extremity below knee.      FAMILY HX  Family history unknown: Adopted      SOCIAL HX  lives alone  former smoker  alcohol use last 2-3 days ago (2020 03:19)      PAST MEDICAL & SURGICAL HISTORY:  Chronic CHF  COPD without exacerbation  Atrial fibrillation  Presence of Watchman left atrial appendage closure device  Hypertension  GERD (gastroesophageal reflux disease)  Chronic obstructive pulmonary disease (COPD)  Anemia  Falls  Meningitis  Collapsed lung  Alcohol withdrawal  Emphysema of lung  Cirrhosis  CHF (congestive heart failure)  Poor historian  Alcohol abuse  Chronic atrial fibrillation  Unilateral amputation of lower extremity below knee  Presence of Watchman left atrial appendage closure device  S/P BKA (below knee amputation) unilateral, left      SOCIAL HISTORY:    FAMILY HISTORY:  No pertinent family history in first degree relatives  Family history unknown: Adopted      ALLERGIES:  Allergies    Ceclor (Rash)  Duricef (Rash)    Intolerances        MEDICATIONS:    MEDICATIONS  (STANDING):  ALBUTerol    90 MICROgram(s) HFA Inhaler 2 Puff(s) Inhalation every 4 hours  aspirin  chewable 81 milliGRAM(s) Oral daily  budesonide 160 MICROgram(s)/formoterol 4.5 MICROgram(s) Inhaler 2 Puff(s) Inhalation two times a day  chlorhexidine 0.12% Liquid 15 milliLiter(s) Oral Mucosa every 12 hours  ciprofloxacin   IVPB 400 milliGRAM(s) IV Intermittent every 12 hours  clopidogrel Tablet 75 milliGRAM(s) Oral daily  dexMEDEtomidine Infusion 1 MICROgram(s)/kG/Hr (29.5 mL/Hr) IV Continuous <Continuous>  digoxin  Injectable 0.125 milliGRAM(s) IV Push daily  folic acid 1 milliGRAM(s) Oral daily  gabapentin 400 milliGRAM(s) Oral three times a day  heparin   Injectable 5000 Unit(s) SubCutaneous every 8 hours  midodrine 10 milliGRAM(s) Oral every 8 hours  multivitamin/minerals/iron Oral Solution (CENTRUM) 15 milliLiter(s) Oral daily  norepinephrine Infusion 0.05 MICROgram(s)/kG/Min (11.1 mL/Hr) IV Continuous <Continuous>  pantoprazole  Injectable 40 milliGRAM(s) IV Push daily  predniSONE   Tablet   Oral   predniSONE   Tablet 10 milliGRAM(s) Oral daily  thiamine 100 milliGRAM(s) Oral daily  tiotropium 18 MICROgram(s) Capsule 1 Capsule(s) Inhalation daily  vancomycin  IVPB 1250 milliGRAM(s) IV Intermittent every 12 hours    MEDICATIONS  (PRN):  acetaminophen   Tablet .. 650 milliGRAM(s) Oral every 6 hours PRN Temp greater or equal to 38.5C (101.3F)  LORazepam   Injectable 1 milliGRAM(s) IV Push every 6 hours PRN Agitation or Anxiety  polyethylene glycol 3350 17 Gram(s) Oral daily PRN Constipation        Vital Signs Last 24 Hrs  T(C): 38.4 (02 May 2020 08:00), Max: 39.4 (01 May 2020 12:00)  T(F): 101.1 (02 May 2020 08:00), Max: 103 (01 May 2020 13:00)  HR: 73 (02 May 2020 11:00) (55 - 93)  BP: 126/51 (02 May 2020 11:00) (80/55 - 142/72)  BP(mean): 66 (02 May 2020 11:00) (59 - 123)  RR: 19 (02 May 2020 11:00) (15 - 23)  SpO2: 96% (02 May 2020 11:00) (90% - 100%)Daily     Daily Weight in k.5 (02 May 2020 04:26)I&O's Summary    01 May 2020 07:01  -  02 May 2020 07:00  --------------------------------------------------------  IN: 4070 mL / OUT: 2025 mL / NET: 2045 mL        PHYSICAL EXAM:    Constitutional: sedated  HEENT: PERR, EOMI,  No oral cyananosis.  Neck:  supple,  No JVD  Respiratory: Breath sounds are clear bilaterally, No wheezing, rales or rhonchi  Cardiovascular: S1 and S2, irregular  Gastrointestinal: Bowel Sounds present, soft, nontender.   Extremities: left BKA  Vascular: diminished  Neurological: not examined    LABS: All Labs Reviewed:                        15.9   18.43 )-----------( 69       ( 02 May 2020 06:13 )             48.8                         16.6   21.69 )-----------( 75       ( 01 May 2020 06:43 )             52.7                         16.3   17.93 )-----------( 76       ( 2020 06:53 )             51.6     02 May 2020 06:13    143    |  104    |  45     ----------------------------<  242    3.6     |  34     |  0.75   01 May 2020 06:43    144    |  102    |  61     ----------------------------<  150    3.6     |  38     |  1.00   2020 06:53    146    |  104    |  61     ----------------------------<  205    3.1     |  38     |  1.22     Ca    8.1        02 May 2020 06:13  Ca    8.0        01 May 2020 06:43  Ca    7.8        2020 06:53  Phos  2.3       02 May 2020 06:13  Phos  3.1       01 May 2020 06:43  Phos  2.8       2020 06:53  Mg     2.4       02 May 2020 06:13  Mg     2.9       01 May 2020 06:43  Mg     2.8       2020 06:53            Blood Culture: Organism Methicillin resistant Staphylococcus aureus  Gram Stain Blood -- Gram Stain   Numerous polymorphonuclear leukocytes per low power field  No Squamous epithelial cells per low power field  Numerous Gram Positive Cocci in Clusters per oil power field  Few Gram Positive Rods per oil power field  Few Gram Negative Rods per oil power field  Specimen Source .Sputum Sputum  Culture-Blood --    Organism --  Gram Stain Blood -- Gram Stain --  Specimen Source .Blood None  Culture-Blood --            RADIOLOGY/EKG:< from: 12 Lead ECG (20 @ 19:44) >  Diagnosis Line *** Poor data quality, interpretation may be adversely affected  Atrial fibrillation with rapid ventricular response  Rightward axis  Septal infarct (cited on or before 17-AUG-2017)  Abnormal ECG  Confirmed by LINDA KABA MD ([id number]) on 2020 11:06:37 AM    < end of copied text >        ECHO/CARDIAC CATHTERIZATION/STRESS TEST:  < from: Transthoracic Echocardiogram Follow Up (20 @ 09:10) >   Impression     Summary     Moderate (2+) mitral regurgitation is present.   Normal aortic valve structure and function.   Moderate (2+) tricuspid valve regurgitation is present.   Normal appearing pulmonic valve structure and function.   The left atrium is moderately dilated.   The left ventricle cavity is moderately dilated.   Normal left ventricular systolic function.   Mild concentric left ventricular hypertrophy is present.   Estimated left ventricular ejection fraction is 60-65 %.   The right atrium appears moderately dilated.   Normal appearing right ventricle structure and function.   All visualized extra cardiac structures appears to be normal.   No evidence of pericardial effusion.     Signature     ----------------------------------------------------------------   Electronically signed by Mike Lennon MD(Interpreting   physician) on 2020 09:09 AM   ----------------------------------------------------------------      < end of copied text >

## 2020-05-02 NOTE — PROGRESS NOTE ADULT - SUBJECTIVE AND OBJECTIVE BOX
Date of service: 05-02-20 @ 10:17    Intubated on ventilatory support  Febrile to 103F  Lethargic  Vancomycin reported high    ROS: unobtainable    MEDICATIONS  (STANDING):  ALBUTerol    90 MICROgram(s) HFA Inhaler 2 Puff(s) Inhalation every 4 hours  aspirin  chewable 81 milliGRAM(s) Oral daily  budesonide 160 MICROgram(s)/formoterol 4.5 MICROgram(s) Inhaler 2 Puff(s) Inhalation two times a day  chlorhexidine 0.12% Liquid 15 milliLiter(s) Oral Mucosa every 12 hours  ciprofloxacin   IVPB 400 milliGRAM(s) IV Intermittent every 12 hours  clopidogrel Tablet 75 milliGRAM(s) Oral daily  dexMEDEtomidine Infusion 1 MICROgram(s)/kG/Hr (29.5 mL/Hr) IV Continuous <Continuous>  digoxin  Injectable 0.125 milliGRAM(s) IV Push daily  folic acid 1 milliGRAM(s) Oral daily  gabapentin 400 milliGRAM(s) Oral three times a day  heparin   Injectable 5000 Unit(s) SubCutaneous every 8 hours  midodrine 10 milliGRAM(s) Oral every 8 hours  multivitamin/minerals/iron Oral Solution (CENTRUM) 15 milliLiter(s) Oral daily  norepinephrine Infusion 0.05 MICROgram(s)/kG/Min (11.1 mL/Hr) IV Continuous <Continuous>  pantoprazole  Injectable 40 milliGRAM(s) IV Push daily  predniSONE   Tablet   Oral   predniSONE   Tablet 10 milliGRAM(s) Oral daily  thiamine 100 milliGRAM(s) Oral daily  tiotropium 18 MICROgram(s) Capsule 1 Capsule(s) Inhalation daily  vancomycin  IVPB 1250 milliGRAM(s) IV Intermittent every 12 hours      Vital Signs Last 24 Hrs  T(C): 38.4 (02 May 2020 08:00), Max: 39.4 (01 May 2020 12:00)  T(F): 101.1 (02 May 2020 08:00), Max: 103 (01 May 2020 13:00)  HR: 81 (02 May 2020 10:13) (55 - 93)  BP: 112/49 (02 May 2020 10:00) (63/45 - 142/72)  BP(mean): 64 (02 May 2020 10:00) (49 - 123)  RR: 20 (02 May 2020 10:00) (15 - 23)  SpO2: 93% (02 May 2020 10:13) (90% - 100%)    Mode: AC/ CMV (Assist Control/ Continuous Mandatory Ventilation), RR (machine): 12, TV (machine): 550, FiO2: 30, PEEP: 5, ITime: 1, MAP: 14, PIP: 28    Physical exam:    Constitutional:  No acute distress  HEENT: NC/AT, EOMI, PERRLA, conjunctivae clear  Neck: supple; thyroid not palpable  Back: no tenderness  Respiratory: respiratory effort normal; b/l rhonchi  Cardiovascular: S1S2 regular, no murmurs  Abdomen: soft, not tender, not distended, positive BS  Genitourinary: no suprapubic tenderness  Lymphatic: no LN palpable  Musculoskeletal: no muscle tenderness, no joint swelling or tenderness  Extremities: no pedal edema  Neurological/ Psychiatric: confused; moving all extremities  Skin: no rashes; no palpable lesions    Labs: reviewed                        15.9   18.43 )-----------( 69       ( 02 May 2020 06:13 )             48.8     05-02    143  |  104  |  45<H>  ----------------------------<  242<H>  3.6   |  34<H>  |  0.75    Ca    8.1<L>      02 May 2020 06:13  Phos  2.3     05-02  Mg     2.4     05-02      Vancomycin Level, Trough: 26.9 ug/mL (05-02 @ 06:13)  Vancomycin Level, Trough: 11.4 ug/mL (04-30 @ 17:45)                        16.6   21.69 )-----------( 75       ( 01 May 2020 06:43 )             52.7     05-01    144  |  102  |  61<H>  ----------------------------<  150<H>  3.6   |  38<H>  |  1.00    Ca    8.0<L>      01 May 2020 06:43  Phos  3.1     05-01  Mg     2.9     05-01    Vancomycin Level, Trough: 11.4 ug/mL (04-30 @ 17:45)  Vancomycin Level, Trough: 39.2 ug/mL (04-30 @ 06:53)                          16.3   17.93 )-----------( 76       ( 30 Apr 2020 06:53 )             51.6     04-30    146<H>  |  104  |  61<H>  ----------------------------<  205<H>  3.1<L>   |  38<H>  |  1.22    Ca    7.8<L>      30 Apr 2020 06:53  Phos  2.8     04-30  Mg     2.8     04-30    Vancomycin Level, Trough: 39.2 ug/mL (04-30 @ 06:53)    C-Reactive Protein, Serum: 1.14 mg/dL (04-19-20 @ 11:33)  C-Reactive Protein, Serum: 1.12 mg/dL (04-18-20 @ 19:43)  D-Dimer Assay, Quantitative: <150 ng/mL DDU (04-18-20 @ 19:43)  Ferritin, Serum: 151 ng/mL (04-18-20 @ 19:43)                        18.0   27.13 )-----------( 126      ( 29 Apr 2020 07:28 )             58.3     04-29    149<H>  |  107  |  67<H>  ----------------------------<  181<H>  4.3   |  37<H>  |  1.58<H>    Ca    8.3<L>      29 Apr 2020 07:28  Phos  6.9     04-29  Mg     2.9     04-29                 17.7   14.05 )-----------( 133      ( 28 Apr 2020 06:48 )             54.5     04-28    154<H>  |  114<H>  |  50<H>  ----------------------------<  172<H>  3.8   |  36<H>  |  1.07    Ca    8.6      28 Apr 2020 06:48  Phos  3.2     04-28  Mg     2.8     04-28    COVID-19 PCR . (04.26.20 @ 00:02)    COVID-19 PCR: NotDetec      Culture - Blood (collected 26 Apr 2020 01:01)  Source: .Blood None  Preliminary Report (27 Apr 2020 10:02):    No growth to date.    Culture - Blood (collected 26 Apr 2020 00:56)  Source: .Blood None  Preliminary Report (27 Apr 2020 10:02):    No growth to date.    Culture - Sputum (collected 25 Apr 2020 01:00)  Source: .Sputum Sputum  trap  Gram Stain (26 Apr 2020 12:29):    Moderate polymorphonuclear leukocytes per low power field    No Squamous epithelial cells per low power field    Rare Gram Positive Rods per oil power field    Rare Gram Positive Cocci in Pairs and Chains per oil power field  Final Report (28 Apr 2020 10:53):    Few Pseudomonas aeruginosa (Carbapenem Resistant)    Moderate Methicillin resistant Staphylococcus aureus    Normal Respiratory Faith present  Organism: Pseudomonas aeruginosa (Carbapenem Resistant)  Methicillin resistant Staphylococcus aureus (28 Apr 2020 10:44)  Organism: Pseudomonas aeruginosa (Carbapenem Resistant) (28 Apr 2020 10:44)      -  Amikacin: S <=16      -  Aztreonam: R >16      -  Cefepime: R >16      -  Ceftazidime: R >16      -  Ciprofloxacin: S <=0.25      -  Gentamicin: S 4      -  Imipenem: R >8      -  Levofloxacin: S <=0.5      -  Meropenem: R >8      -  Piperacillin/Tazobactam: R >64      -  Tobramycin: S <=2      Method Type: LADY  Organism: Methicillin resistant Staphylococcus aureus (28 Apr 2020 10:40)      -  Amoxicillin/Clavulanic Acid: R >4/2      -  Ampicillin: R >8      -  Ampicillin/Sulbactam: R <=8/4      -  Cefazolin: R >16      -  Ceftriaxone: R >32      -  Ciprofloxacin: R >2      -  Clindamycin: R >4      -  Daptomycin: S 0.5      -  Erythromycin: R >4      -  Gentamicin: S <=1 Should not be used as monotherapy      -  Levofloxacin: R >4      -  Linezolid: S 4      -  Meropenem: R >8      -  Moxifloxacin(Aerobic): R >4      -  Oxacillin: R >2      -  Penicillin: R >8      -  RIF- Rifampin: S <=1 Should not be used as monotherapy      -  Tetra/Doxy: S 2      -  Trimethoprim/Sulfamethoxazole: S <=0.5/9.5      -  Vancomycin: S 2      Method Type: LADY    Culture - Urine (collected 21 Apr 2020 15:51)  Source: .Urine Clean Catch (Midstream)  Final Report (22 Apr 2020 17:03):    <10,000 CFU/mL Normal Urogenital Faith    Culture - Blood (collected 18 Apr 2020 19:43)  Source: .Blood Blood-Peripheral  Final Report (24 Apr 2020 01:01):    No Growth Final    Culture - Sputum . (04.29.20 @ 15:00)    -  Clindamycin: R >4    -  Erythromycin: R >4    -  Gentamicin: S <=1 Should not be used as monotherapy    -  Linezolid: S 4    -  Oxacillin: R >2    -  Penicillin: R >8    -  RIF- Rifampin: S <=1 Should not be used as monotherapy    -  Tetra/Doxy: S 2    -  Trimethoprim/Sulfamethoxazole: S <=0.5/9.5    -  Vancomycin: S 2    -  Ampicillin/Sulbactam: R <=8/4    Gram Stain:   Numerous polymorphonuclear leukocytes per low power field  No Squamous epithelial cells per low power field  Numerous Gram Positive Cocci in Clusters per oil power field  Few Gram Positive Rods per oil power field  Few Gram Negative Rods per oil power field    -  Cefazolin: R >16    Specimen Source: .Sputum Sputum    Culture Results:   Numerous Methicillin resistant Staphylococcus aureus  Normal Respiratory Faith present    Organism Identification: Methicillin resistant Staphylococcus aureus    Organism: Methicillin resistant Staphylococcus aureus    Method Type: LADY        Radiology: all available radiological tests reviewed    < from: Xray Chest 1 View AP/PA. (04.25.20 @ 10:12) >  Percent of LEFT lung opacification: Clear  Percent of RIGHT lung opacification: Clear  Change in lung opacification from most recent x-ray (<=3 days): Stable  Change from prior dated 3 or more days (same admission): No Prior    < end of copied text >    < from: Xray Chest 1 View-PORTABLE IMMEDIATE (04.29.20 @ 07:36) >  Percent of LEFT lung opacification: %  Percent of RIGHT lung opacification: Clear  Change in lung opacification from most recent x-ray (<=3 days): Increase  Change from prior dated 3or more days (same admission): Increase    < end of copied text >      Advanced directives addressed: full resuscitation

## 2020-05-03 LAB
BASE EXCESS BLDA CALC-SCNC: 7.1 MMOL/L — HIGH (ref -2–2)
BLOOD GAS COMMENTS ARTERIAL: SIGNIFICANT CHANGE UP
GAS PNL BLDA: SIGNIFICANT CHANGE UP
HCO3 BLDA-SCNC: 31 MMOL/L — HIGH (ref 21–29)
HCT VFR BLD CALC: 45.9 % — SIGNIFICANT CHANGE UP (ref 39–50)
HGB BLD-MCNC: 15.4 G/DL — SIGNIFICANT CHANGE UP (ref 13–17)
MCHC RBC-ENTMCNC: 32.9 PG — SIGNIFICANT CHANGE UP (ref 27–34)
MCHC RBC-ENTMCNC: 33.6 GM/DL — SIGNIFICANT CHANGE UP (ref 32–36)
MCV RBC AUTO: 98.1 FL — SIGNIFICANT CHANGE UP (ref 80–100)
PCO2 BLDA: 44 MMHG — SIGNIFICANT CHANGE UP (ref 32–46)
PH BLDA: 7.46 — HIGH (ref 7.35–7.45)
PLATELET # BLD AUTO: 73 K/UL — LOW (ref 150–400)
PO2 BLDA: 69 MMHG — LOW (ref 74–108)
RBC # BLD: 4.68 M/UL — SIGNIFICANT CHANGE UP (ref 4.2–5.8)
RBC # FLD: 12.8 % — SIGNIFICANT CHANGE UP (ref 10.3–14.5)
SAO2 % BLDA: 93 % — SIGNIFICANT CHANGE UP (ref 92–96)
WBC # BLD: 15.99 K/UL — HIGH (ref 3.8–10.5)
WBC # FLD AUTO: 15.99 K/UL — HIGH (ref 3.8–10.5)

## 2020-05-03 PROCEDURE — 71045 X-RAY EXAM CHEST 1 VIEW: CPT | Mod: 26

## 2020-05-03 PROCEDURE — 99233 SBSQ HOSP IP/OBS HIGH 50: CPT

## 2020-05-03 PROCEDURE — 99291 CRITICAL CARE FIRST HOUR: CPT

## 2020-05-03 RX ORDER — SENNA PLUS 8.6 MG/1
2 TABLET ORAL AT BEDTIME
Refills: 0 | Status: DISCONTINUED | OUTPATIENT
Start: 2020-05-03 | End: 2020-05-22

## 2020-05-03 RX ORDER — PROPOFOL 10 MG/ML
10 INJECTION, EMULSION INTRAVENOUS
Qty: 1000 | Refills: 0 | Status: DISCONTINUED | OUTPATIENT
Start: 2020-05-03 | End: 2020-05-11

## 2020-05-03 RX ORDER — FENTANYL CITRATE 50 UG/ML
100 INJECTION INTRAVENOUS ONCE
Refills: 0 | Status: DISCONTINUED | OUTPATIENT
Start: 2020-05-03 | End: 2020-05-03

## 2020-05-03 RX ADMIN — HEPARIN SODIUM 5000 UNIT(S): 5000 INJECTION INTRAVENOUS; SUBCUTANEOUS at 15:22

## 2020-05-03 RX ADMIN — GABAPENTIN 400 MILLIGRAM(S): 400 CAPSULE ORAL at 05:15

## 2020-05-03 RX ADMIN — GABAPENTIN 400 MILLIGRAM(S): 400 CAPSULE ORAL at 21:06

## 2020-05-03 RX ADMIN — MIDODRINE HYDROCHLORIDE 10 MILLIGRAM(S): 2.5 TABLET ORAL at 05:15

## 2020-05-03 RX ADMIN — CHLORHEXIDINE GLUCONATE 15 MILLILITER(S): 213 SOLUTION TOPICAL at 17:56

## 2020-05-03 RX ADMIN — PROPOFOL 7.07 MICROGRAM(S)/KG/MIN: 10 INJECTION, EMULSION INTRAVENOUS at 08:29

## 2020-05-03 RX ADMIN — FENTANYL CITRATE 100 MICROGRAM(S): 50 INJECTION INTRAVENOUS at 01:43

## 2020-05-03 RX ADMIN — Medication 100 MILLIGRAM(S): at 08:30

## 2020-05-03 RX ADMIN — Medication 166.67 MILLIGRAM(S): at 17:55

## 2020-05-03 RX ADMIN — CHLORHEXIDINE GLUCONATE 15 MILLILITER(S): 213 SOLUTION TOPICAL at 05:14

## 2020-05-03 RX ADMIN — Medication 1 MILLIGRAM(S): at 04:24

## 2020-05-03 RX ADMIN — Medication 200 MILLIGRAM(S): at 17:55

## 2020-05-03 RX ADMIN — CLOPIDOGREL BISULFATE 75 MILLIGRAM(S): 75 TABLET, FILM COATED ORAL at 08:29

## 2020-05-03 RX ADMIN — POLYETHYLENE GLYCOL 3350 17 GRAM(S): 17 POWDER, FOR SOLUTION ORAL at 01:25

## 2020-05-03 RX ADMIN — Medication 1 MILLIGRAM(S): at 08:30

## 2020-05-03 RX ADMIN — MIDODRINE HYDROCHLORIDE 10 MILLIGRAM(S): 2.5 TABLET ORAL at 15:25

## 2020-05-03 RX ADMIN — Medication 10 MILLIGRAM(S): at 05:15

## 2020-05-03 RX ADMIN — Medication 15 MILLILITER(S): at 08:30

## 2020-05-03 RX ADMIN — MIDODRINE HYDROCHLORIDE 10 MILLIGRAM(S): 2.5 TABLET ORAL at 21:06

## 2020-05-03 RX ADMIN — DEXMEDETOMIDINE HYDROCHLORIDE IN 0.9% SODIUM CHLORIDE 29.5 MICROGRAM(S)/KG/HR: 4 INJECTION INTRAVENOUS at 05:34

## 2020-05-03 RX ADMIN — HEPARIN SODIUM 5000 UNIT(S): 5000 INJECTION INTRAVENOUS; SUBCUTANEOUS at 05:15

## 2020-05-03 RX ADMIN — Medication 81 MILLIGRAM(S): at 08:29

## 2020-05-03 RX ADMIN — GABAPENTIN 400 MILLIGRAM(S): 400 CAPSULE ORAL at 15:26

## 2020-05-03 RX ADMIN — Medication 0.12 MILLIGRAM(S): at 08:30

## 2020-05-03 RX ADMIN — DEXMEDETOMIDINE HYDROCHLORIDE IN 0.9% SODIUM CHLORIDE 29.5 MICROGRAM(S)/KG/HR: 4 INJECTION INTRAVENOUS at 08:29

## 2020-05-03 RX ADMIN — Medication 166.67 MILLIGRAM(S): at 05:15

## 2020-05-03 RX ADMIN — HEPARIN SODIUM 5000 UNIT(S): 5000 INJECTION INTRAVENOUS; SUBCUTANEOUS at 21:06

## 2020-05-03 RX ADMIN — Medication 200 MILLIGRAM(S): at 05:14

## 2020-05-03 RX ADMIN — SENNA PLUS 2 TABLET(S): 8.6 TABLET ORAL at 21:06

## 2020-05-03 RX ADMIN — PANTOPRAZOLE SODIUM 40 MILLIGRAM(S): 20 TABLET, DELAYED RELEASE ORAL at 08:29

## 2020-05-03 NOTE — PROGRESS NOTE ADULT - ASSESSMENT
A/P: 65 male with acute respiratory failure from altered awareness and sedation  COPD  AFIB s/p watchman  CHF  HTN  ETOH abuse      Plan:   CICU    PULM: Failed weaning with an increased RR and low TV.-- ABG fine today on 30% Fi O2    Cardio: Off levo, continue ASA, Plavix,     Renal: Cr. Stable    GI: Feeds, PPI    ID: Vanco MRSA PNA    PSY: Ativan 1mg  ATC PRN, Thiamine for chronic ETOH abuse and thiamine deficiency folate    Lovenox DVT Prophylaxis    D/W the patients daughter

## 2020-05-03 NOTE — AIRWAY PLACEMENT NOTE ADULT - POST AIRWAY PLACEMENT ASSESSMENT:
breath sounds equal/skin color improved/breath sounds bilateral/positive end tidal CO2 noted/CXR pending/chest excursion noted
chest excursion noted/CXR pending/positive end tidal CO2 noted

## 2020-05-03 NOTE — PROGRESS NOTE ADULT - ASSESSMENT
64 y/o male with h/o chronic A.Fib with Watchman device, CHF, COPD, HTN, obesity was admitted on 4/18 for worsening SOB. In ER he was found with rapid A.fib and was placed on NRB. He tested COVID-19 PCR negative x 3. Noted with hypercapnea and placed on BiPAP, then intubated and placed on ventilatory support. He was extubated on 4/26. He is reported with MDRO's in sputum c/s. He was treated with azithromycin from 4/20 to 4/25.     1. Acute respiratory failure. Febrile syndrome improving. 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 Allergy to PCN and cephalosporines.  -fever is down  -leukocytosis is persistent  -encephalopathy  -cultures reviewed; repeat sputum c/s shows MRSA and normal respiratory raymundo  -on vancomycin 1 gm IV q12h and cipro 400 mg IV q12h # 6  -tolerating abx well so far; no side effects noted  -repeat vancomycin trough level is rtherapeutic  -respiratory care  -continue IV abx coverage   -weaning trial  -monitor temps  -f/u CBC  -supportive care  2. Other issues:   -care per medicine

## 2020-05-03 NOTE — PROGRESS NOTE ADULT - SUBJECTIVE AND OBJECTIVE BOX
Patient seen at a  this morning for AMS and hypoxic  Patient had been admitted with a COPD exacerbation.  It was believed that he went into ETOH withdrawal and had a total of 6MG Ativan over night.  He required intubation this morning.        4/24: he remains intubated.  Will try to wean this afternoon.      4/25: He weaned this morning and failed with a decreased Tv.  But close to extubation.    4/26: Patient weaned well this morning and was extubated.      5/2: Chart reviewed, Patient was reintubated, failed weaning this morning, CXR with improving infiltrates  5/3: Patient remains on the vent failed weaning x3 today with increased RR and low TV, Off Levophed now    PAST MEDICAL HX  Alcohol abuse    Alcohol withdrawal    Anemia    AFIB atrial fibrillation    CHF (congestive heart failure)    Chronic atrial fibrillation    Chronic CHF    Chronic obstructive pulmonary disease (COPD)    Cirrhosis    Collapsed lung    COPD without exacerbation    Emphysema of lung    Falls    GERD (gastroesophageal reflux disease)    HTN hypertension    Meningitis    Poor historian    Presence of Watchman left atrial appendage closure device.    PAST SURGICAL HX  Presence of Watchman left atrial appendage closure device    S/P BKA (below knee amputation) unilateral, left    Unilateral amputation of lower extremity below knee.      FAMILY HX  Family history unknown: Adopted      SOCIAL HX  lives alone  former smoker  alcohol use last 2-3 days ago (19 Apr 2020 03:19)       PAST MEDICAL & SURGICAL HISTORY:  Chronic CHF  COPD without exacerbation  Atrial fibrillation  Presence of Watchman left atrial appendage closure device  Hypertension  GERD (gastroesophageal reflux disease)  Chronic obstructive pulmonary disease (COPD)  Anemia  Falls  Meningitis  Collapsed lung  Alcohol withdrawal  Emphysema of lung  Cirrhosis  CHF (congestive heart failure)  Poor historian  Alcohol abuse  Chronic atrial fibrillation  Unilateral amputation of lower extremity below knee  Presence of Watchman left atrial appendage closure device  S/P BKA (below knee amputation) unilateral, left      FAMILY HISTORY:  No pertinent family history in first degree relatives  Family history unknown: Adopted      Social Hx:    Allergies    Ceclor (Rash)  Duricef (Rash)    Intolerances            ICU Vital Signs Last 24 Hrs  T(C): 38.2 (03 May 2020 07:46), Max: 38.4 (02 May 2020 20:00)  T(F): 100.8 (03 May 2020 07:46), Max: 101.1 (02 May 2020 20:00)  HR: 75 (03 May 2020 11:02) (52 - 78)  BP: 114/58 (03 May 2020 10:00) (98/51 - 152/69)  BP(mean): 67 (03 May 2020 10:00) (59 - 87)  ABP: --  ABP(mean): --  RR: 17 (03 May 2020 10:00) (15 - 26)  SpO2: 97% (03 May 2020 11:02) (93% - 99%)      Mode: AC/ CMV (Assist Control/ Continuous Mandatory Ventilation)  RR (machine): 12  TV (machine): 550  FiO2: 30  PEEP: 5  ITime: 1  MAP: 17  PIP: 26      I&O's Summary    02 May 2020 07:01  -  03 May 2020 07:00  --------------------------------------------------------  IN: 2206.9 mL / OUT: 1700 mL / NET: 506.9 mL                              15.4   15.99 )-----------( 73       ( 03 May 2020 08:48 )             45.9       05-02    143  |  104  |  45<H>  ----------------------------<  242<H>  3.6   |  34<H>  |  0.75    Ca    8.1<L>      02 May 2020 06:13  Phos  2.3     05-02  Mg     2.4     05-02              ABG - ( 03 May 2020 05:44 )  pH, Arterial: 7.46  pH, Blood: x     /  pCO2: 44    /  pO2: 69    / HCO3: 31    / Base Excess: 7.1   /  SaO2: 93                      MEDICATIONS  (STANDING):  ALBUTerol    90 MICROgram(s) HFA Inhaler 2 Puff(s) Inhalation every 4 hours  aspirin  chewable 81 milliGRAM(s) Oral daily  budesonide 160 MICROgram(s)/formoterol 4.5 MICROgram(s) Inhaler 2 Puff(s) Inhalation two times a day  chlorhexidine 0.12% Liquid 15 milliLiter(s) Oral Mucosa every 12 hours  ciprofloxacin   IVPB 400 milliGRAM(s) IV Intermittent every 12 hours  clopidogrel Tablet 75 milliGRAM(s) Oral daily  dexMEDEtomidine Infusion 1 MICROgram(s)/kG/Hr (29.5 mL/Hr) IV Continuous <Continuous>  folic acid 1 milliGRAM(s) Oral daily  gabapentin 400 milliGRAM(s) Oral three times a day  heparin   Injectable 5000 Unit(s) SubCutaneous every 8 hours  midodrine 10 milliGRAM(s) Oral every 8 hours  multivitamin/minerals/iron Oral Solution (CENTRUM) 15 milliLiter(s) Oral daily  norepinephrine Infusion 0.05 MICROgram(s)/kG/Min (11.1 mL/Hr) IV Continuous <Continuous>  pantoprazole  Injectable 40 milliGRAM(s) IV Push daily  predniSONE   Tablet   Oral   propofol Infusion 10 MICROgram(s)/kG/Min (7.07 mL/Hr) IV Continuous <Continuous>  senna 2 Tablet(s) Oral at bedtime  thiamine 100 milliGRAM(s) Oral daily  tiotropium 18 MICROgram(s) Capsule 1 Capsule(s) Inhalation daily  vancomycin  IVPB 1250 milliGRAM(s) IV Intermittent every 12 hours    MEDICATIONS  (PRN):  acetaminophen   Tablet .. 650 milliGRAM(s) Oral every 6 hours PRN Temp greater or equal to 38.5C (101.3F)  LORazepam   Injectable 1 milliGRAM(s) IV Push every 6 hours PRN Agitation or Anxiety  polyethylene glycol 3350 17 Gram(s) Oral daily PRN Constipation      DVT Prophylaxis:    Advanced Directives:  Discussed with:    Visit Information: 30 min    ** Time is exclusive of billed procedures and/or teaching and/or routine family updates.

## 2020-05-03 NOTE — PROGRESS NOTE ADULT - SUBJECTIVE AND OBJECTIVE BOX
PCP:    REQUESTING PHYSICIAN:    REASON FOR CONSULT:    CHIEF COMPLAINT:    HPI:  65 year old man with a history of chronic AF, Watchman device (implanted 4/2019), HTN, CVAs, alcoholism, tobacco abuse, COPD, HFpEF (60% 12/2018), GERD, cirrhosis, left BKA following traumatic injury admitted on 4/18/2020 with  acute on chronic hypercapnic & hypoxemic respiratory failure due to suspected exacerbation of COPD and/or bronchitis.  His hospital course has been complicated by encephalopathy and recurrent hypoxia / respiratory failure.    4/21/20 Patient complain of wheezing ,no sob at rest , on 4 L NC O2  despite of being on IV lasix ,  heart rate is 80 to 100s    4/22/2020:  "I'm still wheezing."  No new complaints.  4/23/2020:  Overnight events noted and case discussed with Dr Crews; sedated on vent in CICU.  4/24/20  Patient is remain intubated , his heart rate is controlled , rectus sheath hematoma without significant drop in hemoglobin level ,   4/25/20: no real changes from yesterday and remains intubated and sedated.   4/26/20: Mental status not improved and now extubated.  In rapid atrial fib AM.  Cannot take po due to mental status.    4/27/20: Mental status still not improved enough to take medication oral and despite maximum cardizem   4/28/20 Patient is lethargic arousble confused , hypertensive , patient had low grade fever did  receive IV Tylenol , patient heart rate is better after starting him on digoxin , and on maximum cardizem drip patient hypernatremic , poor oral intake    4/29/20: Hypoxic this AM w/ agonal respirations, emergently intubated.  4/30/20:  Sedated on ventilator.  5/1/2020:  Sedated with propofol.    5/2/20: Sedated, intubated. Afib 70s-80s  5/3/20: Sedated, bradycardic to the 30s Afib            PAST MEDICAL HX  Alcohol abuse    Alcohol withdrawal    Anemia    AFIB atrial fibrillation    CHF (congestive heart failure)    Chronic atrial fibrillation    Chronic CHF    Chronic obstructive pulmonary disease (COPD)    Cirrhosis    Collapsed lung    COPD without exacerbation    Emphysema of lung    Falls    GERD (gastroesophageal reflux disease)    HTN hypertension    Meningitis    Poor historian    Presence of Watchman left atrial appendage closure device.    PAST SURGICAL HX  Presence of Watchman left atrial appendage closure device    S/P BKA (below knee amputation) unilateral, left    Unilateral amputation of lower extremity below knee.      FAMILY HX  Family history unknown: Adopted      SOCIAL HX  lives alone  former smoker  alcohol use last 2-3 days ago (19 Apr 2020 03:19)      PAST MEDICAL & SURGICAL HISTORY:  Chronic CHF  COPD without exacerbation  Atrial fibrillation  Presence of Watchman left atrial appendage closure device  Hypertension  GERD (gastroesophageal reflux disease)  Chronic obstructive pulmonary disease (COPD)  Anemia  Falls  Meningitis  Collapsed lung  Alcohol withdrawal  Emphysema of lung  Cirrhosis  CHF (congestive heart failure)  Poor historian  Alcohol abuse  Chronic atrial fibrillation  Unilateral amputation of lower extremity below knee  Presence of Watchman left atrial appendage closure device  S/P BKA (below knee amputation) unilateral, left      SOCIAL HISTORY:    FAMILY HISTORY:  No pertinent family history in first degree relatives  Family history unknown: Adopted      ALLERGIES:  Allergies    Ceclor (Rash)  Duricef (Rash)    Intolerances        MEDICATIONS:    MEDICATIONS  (STANDING):  ALBUTerol    90 MICROgram(s) HFA Inhaler 2 Puff(s) Inhalation every 4 hours  aspirin  chewable 81 milliGRAM(s) Oral daily  budesonide 160 MICROgram(s)/formoterol 4.5 MICROgram(s) Inhaler 2 Puff(s) Inhalation two times a day  chlorhexidine 0.12% Liquid 15 milliLiter(s) Oral Mucosa every 12 hours  ciprofloxacin   IVPB 400 milliGRAM(s) IV Intermittent every 12 hours  clopidogrel Tablet 75 milliGRAM(s) Oral daily  dexMEDEtomidine Infusion 1 MICROgram(s)/kG/Hr (29.5 mL/Hr) IV Continuous <Continuous>  folic acid 1 milliGRAM(s) Oral daily  gabapentin 400 milliGRAM(s) Oral three times a day  heparin   Injectable 5000 Unit(s) SubCutaneous every 8 hours  midodrine 10 milliGRAM(s) Oral every 8 hours  multivitamin/minerals/iron Oral Solution (CENTRUM) 15 milliLiter(s) Oral daily  norepinephrine Infusion 0.05 MICROgram(s)/kG/Min (11.1 mL/Hr) IV Continuous <Continuous>  pantoprazole  Injectable 40 milliGRAM(s) IV Push daily  predniSONE   Tablet   Oral   propofol Infusion 10 MICROgram(s)/kG/Min (7.07 mL/Hr) IV Continuous <Continuous>  senna 2 Tablet(s) Oral at bedtime  thiamine 100 milliGRAM(s) Oral daily  tiotropium 18 MICROgram(s) Capsule 1 Capsule(s) Inhalation daily  vancomycin  IVPB 1250 milliGRAM(s) IV Intermittent every 12 hours    MEDICATIONS  (PRN):  acetaminophen   Tablet .. 650 milliGRAM(s) Oral every 6 hours PRN Temp greater or equal to 38.5C (101.3F)  LORazepam   Injectable 1 milliGRAM(s) IV Push every 6 hours PRN Agitation or Anxiety  polyethylene glycol 3350 17 Gram(s) Oral daily PRN Constipation          Vital Signs Last 24 Hrs  T(C): 38.2 (03 May 2020 07:46), Max: 38.4 (02 May 2020 20:00)  T(F): 100.8 (03 May 2020 07:46), Max: 101.1 (02 May 2020 20:00)  HR: 65 (03 May 2020 10:00) (52 - 78)  BP: 114/58 (03 May 2020 10:00) (98/51 - 152/69)  BP(mean): 67 (03 May 2020 10:00) (59 - 87)  RR: 17 (03 May 2020 10:00) (15 - 26)  SpO2: 98% (03 May 2020 10:00) (93% - 99%)Daily     Daily I&O's Summary    02 May 2020 07:01  -  03 May 2020 07:00  --------------------------------------------------------  IN: 2206.9 mL / OUT: 1700 mL / NET: 506.9 mL        PHYSICAL EXAM:    Constitutional: intubated and sedated  HEENT: PERR, EOMI,  No oral cyananosis.  Neck:  supple,  No JVD  Respiratory: Breath sounds are clear bilaterally,   Cardiovascular: S1 and S2,irregular  Gastrointestinal: Bowel Sounds present, soft, nontender.   Extremities: edema, BKA  Vascular: decreased      LABS: All Labs Reviewed:                        15.4   15.99 )-----------( 73       ( 03 May 2020 08:48 )             45.9                         15.9   18.43 )-----------( 69       ( 02 May 2020 06:13 )             48.8                         16.6   21.69 )-----------( 75       ( 01 May 2020 06:43 )             52.7     02 May 2020 06:13    143    |  104    |  45     ----------------------------<  242    3.6     |  34     |  0.75   01 May 2020 06:43    144    |  102    |  61     ----------------------------<  150    3.6     |  38     |  1.00     Ca    8.1        02 May 2020 06:13  Ca    8.0        01 May 2020 06:43  Phos  2.3       02 May 2020 06:13  Phos  3.1       01 May 2020 06:43  Mg     2.4       02 May 2020 06:13  Mg     2.9       01 May 2020 06:43            Blood Culture: Organism Methicillin resistant Staphylococcus aureus  Gram Stain Blood -- Gram Stain   Numerous polymorphonuclear leukocytes per low power field  No Squamous epithelial cells per low power field  Numerous Gram Positive Cocci in Clusters per oil power field  Few Gram Positive Rods per oil power field  Few Gram Negative Rods per oil power field  Specimen Source .Sputum Sputum  Culture-Blood --    Organism --  Gram Stain Blood -- Gram Stain --  Specimen Source .Blood None  Culture-Blood --            RADIOLOGY/EKG:< from: 12 Lead ECG (04.18.20 @ 19:44) >  Diagnosis Line *** Poor data quality, interpretation may be adversely affected  Atrial fibrillation with rapid ventricular response  Rightward axis  Septal infarct (cited on or before 17-AUG-2017)  Abnormal ECG  Confirmed by SESAR SMITH, LINDA ([id number]) on 4/19/2020 11:06:37 AM    < end of copied text >        ECHO/CARDIAC CATHTERIZATION/STRESS TEST:  < from: Transthoracic Echocardiogram Follow Up (04.21.20 @ 09:10) >  Impression     Summary     Moderate (2+) mitral regurgitation is present.   Normal aortic valve structure and function.   Moderate (2+) tricuspid valve regurgitation is present.   Normal appearing pulmonic valve structure and function.   The left atrium is moderately dilated.   The left ventricle cavity is moderately dilated.   Normal left ventricular systolic function.   Mild concentric left ventricular hypertrophy is present.   Estimated left ventricular ejection fraction is 60-65 %.   The right atrium appears moderately dilated.   Normal appearing right ventricle structure and function.   All visualized extra cardiac structures appears to be normal.   No evidence of pericardial effusion.     Signature     ----------------------------------------------------------------   Electronically signed by Mike Lennon MD([facility]   physician) on 04/22/2020 09:09 AM   ----------------------------------------------------------------    < end of copied text >

## 2020-05-03 NOTE — PROGRESS NOTE ADULT - SUBJECTIVE AND OBJECTIVE BOX
Subjective:    pat on vent, sedated, iv propohyl/percedex/lorazepam, off pressors.    Home Medications:  albuterol 2.5 mg/3 mL (0.083%) inhalation solution: 3 milliliter(s) inhaled every 6 hours, As Needed -for shortness of breath and/or wheezing (19 Apr 2020 03:12)  gabapentin 400 mg oral capsule: 1 cap(s) orally 3 times a day (19 Apr 2020 03:12)  pantoprazole 40 mg oral granule, enteric coated: 40 milligram(s) orally once a day (19 Apr 2020 03:12)  Spiriva 18 mcg inhalation capsule: 1 cap(s) inhaled once a day (19 Apr 2020 03:12)  tamsulosin 0.4 mg oral capsule: 1 cap(s) orally once a day (at bedtime) (19 Apr 2020 03:12)  traZODone 50 mg oral tablet: 2 tab(s) orally once a day (at bedtime), As Needed (19 Apr 2020 03:12)    MEDICATIONS  (STANDING):  ALBUTerol    90 MICROgram(s) HFA Inhaler 2 Puff(s) Inhalation every 4 hours  aspirin  chewable 81 milliGRAM(s) Oral daily  budesonide 160 MICROgram(s)/formoterol 4.5 MICROgram(s) Inhaler 2 Puff(s) Inhalation two times a day  chlorhexidine 0.12% Liquid 15 milliLiter(s) Oral Mucosa every 12 hours  ciprofloxacin   IVPB 400 milliGRAM(s) IV Intermittent every 12 hours  clopidogrel Tablet 75 milliGRAM(s) Oral daily  dexMEDEtomidine Infusion 1 MICROgram(s)/kG/Hr (29.5 mL/Hr) IV Continuous <Continuous>  folic acid 1 milliGRAM(s) Oral daily  gabapentin 400 milliGRAM(s) Oral three times a day  heparin   Injectable 5000 Unit(s) SubCutaneous every 8 hours  midodrine 10 milliGRAM(s) Oral every 8 hours  multivitamin/minerals/iron Oral Solution (CENTRUM) 15 milliLiter(s) Oral daily  norepinephrine Infusion 0.05 MICROgram(s)/kG/Min (11.1 mL/Hr) IV Continuous <Continuous>  pantoprazole  Injectable 40 milliGRAM(s) IV Push daily  predniSONE   Tablet   Oral   propofol Infusion 10 MICROgram(s)/kG/Min (7.07 mL/Hr) IV Continuous <Continuous>  senna 2 Tablet(s) Oral at bedtime  thiamine 100 milliGRAM(s) Oral daily  tiotropium 18 MICROgram(s) Capsule 1 Capsule(s) Inhalation daily  vancomycin  IVPB 1250 milliGRAM(s) IV Intermittent every 12 hours    MEDICATIONS  (PRN):  acetaminophen   Tablet .. 650 milliGRAM(s) Oral every 6 hours PRN Temp greater or equal to 38.5C (101.3F)  LORazepam   Injectable 1 milliGRAM(s) IV Push every 6 hours PRN Agitation or Anxiety  polyethylene glycol 3350 17 Gram(s) Oral daily PRN Constipation      Allergies    Ceclor (Rash)  Duricef (Rash)    Intolerances        Vital Signs Last 24 Hrs  T(C): 38.2 (03 May 2020 07:46), Max: 38.4 (02 May 2020 20:00)  T(F): 100.8 (03 May 2020 07:46), Max: 101.1 (02 May 2020 20:00)  HR: 75 (03 May 2020 11:02) (52 - 78)  BP: 114/58 (03 May 2020 10:00) (98/51 - 152/69)  BP(mean): 67 (03 May 2020 10:00) (59 - 87)  RR: 17 (03 May 2020 10:00) (15 - 26)  SpO2: 97% (03 May 2020 11:02) (93% - 99%)  Mode: AC/ CMV (Assist Control/ Continuous Mandatory Ventilation)  RR (machine): 12  TV (machine): 550  FiO2: 30  PEEP: 5  ITime: 1  MAP: 17  PIP: 26      PHYSICAL EXAMINATION:    NECK:  Supple. No lymphadenopathy. Jugular venous pressure not elevated. Carotids equal.   HEART:   The cardiac impulse has a normal quality. Reg., Nl S1 and S2.  There are no murmurs, rubs or gallops noted  CHEST:  Chest poor air entry to auscultation. Normal respiratory effort.  ABDOMEN:  Soft and nontender.   EXTREMITIES:  There is no edema.       LABS:                        15.4   15.99 )-----------( 73       ( 03 May 2020 08:48 )             45.9     05-02    143  |  104  |  45<H>  ----------------------------<  242<H>  3.6   |  34<H>  |  0.75    Ca    8.1<L>      02 May 2020 06:13  Phos  2.3     05-02  Mg     2.4     05-02

## 2020-05-03 NOTE — PROGRESS NOTE ADULT - SUBJECTIVE AND OBJECTIVE BOX
Date of service: 05-03-20 @ 11:58    Lying in bed on ventilatory support  Lethargic  Febrile to 101.1F    ROS: unobtainable    MEDICATIONS  (STANDING):  ALBUTerol    90 MICROgram(s) HFA Inhaler 2 Puff(s) Inhalation every 4 hours  aspirin  chewable 81 milliGRAM(s) Oral daily  budesonide 160 MICROgram(s)/formoterol 4.5 MICROgram(s) Inhaler 2 Puff(s) Inhalation two times a day  chlorhexidine 0.12% Liquid 15 milliLiter(s) Oral Mucosa every 12 hours  ciprofloxacin   IVPB 400 milliGRAM(s) IV Intermittent every 12 hours  clopidogrel Tablet 75 milliGRAM(s) Oral daily  dexMEDEtomidine Infusion 1 MICROgram(s)/kG/Hr (29.5 mL/Hr) IV Continuous <Continuous>  folic acid 1 milliGRAM(s) Oral daily  gabapentin 400 milliGRAM(s) Oral three times a day  heparin   Injectable 5000 Unit(s) SubCutaneous every 8 hours  midodrine 10 milliGRAM(s) Oral every 8 hours  multivitamin/minerals/iron Oral Solution (CENTRUM) 15 milliLiter(s) Oral daily  norepinephrine Infusion 0.05 MICROgram(s)/kG/Min (11.1 mL/Hr) IV Continuous <Continuous>  pantoprazole  Injectable 40 milliGRAM(s) IV Push daily  predniSONE   Tablet   Oral   propofol Infusion 10 MICROgram(s)/kG/Min (7.07 mL/Hr) IV Continuous <Continuous>  senna 2 Tablet(s) Oral at bedtime  thiamine 100 milliGRAM(s) Oral daily  tiotropium 18 MICROgram(s) Capsule 1 Capsule(s) Inhalation daily  vancomycin  IVPB 1250 milliGRAM(s) IV Intermittent every 12 hours      Vital Signs Last 24 Hrs  T(C): 38.2 (03 May 2020 07:46), Max: 38.4 (02 May 2020 20:00)  T(F): 100.8 (03 May 2020 07:46), Max: 101.1 (02 May 2020 20:00)  HR: 65 (03 May 2020 10:00) (52 - 78)  BP: 114/58 (03 May 2020 10:00) (98/51 - 152/69)  BP(mean): 67 (03 May 2020 10:00) (59 - 87)  RR: 17 (03 May 2020 10:00) (15 - 26)  SpO2: 98% (03 May 2020 10:00) (93% - 99%)    Mode: AC/ CMV (Assist Control/ Continuous Mandatory Ventilation), RR (machine): 12, TV (machine): 550, FiO2: 30, PEEP: 5, ITime: 1, MAP: 16, PIP: 25    Physical exam:    Constitutional:  No acute distress  HEENT: NC/AT, EOMI, PERRLA, conjunctivae clear  Neck: supple; thyroid not palpable  Back: no tenderness  Respiratory: respiratory effort normal; b/l rhonchi  Cardiovascular: S1S2 regular, no murmurs  Abdomen: soft, not tender, not distended, positive BS  Genitourinary: no suprapubic tenderness  Lymphatic: no LN palpable  Musculoskeletal: no muscle tenderness, no joint swelling or tenderness  Extremities: no pedal edema  Neurological/ Psychiatric: confused; moving all extremities  Skin: no rashes; no palpable lesions    Labs: reviewed                        15.4   15.99 )-----------( 73       ( 03 May 2020 08:48 )             45.9     05-02    143  |  104  |  45<H>  ----------------------------<  242<H>  3.6   |  34<H>  |  0.75    Ca    8.1<L>      02 May 2020 06:13  Phos  2.3     05-02  Mg     2.4     05-02      Vancomycin Level, Trough: 10.1 ug/mL (05-02 @ 16:59)  Vancomycin Level, Trough: 26.9 ug/mL (05-02 @ 06:13)                          16.3   17.93 )-----------( 76       ( 30 Apr 2020 06:53 )             51.6     04-30    146<H>  |  104  |  61<H>  ----------------------------<  205<H>  3.1<L>   |  38<H>  |  1.22    Ca    7.8<L>      30 Apr 2020 06:53  Phos  2.8     04-30  Mg     2.8     04-30    Vancomycin Level, Trough: 39.2 ug/mL (04-30 @ 06:53)    C-Reactive Protein, Serum: 1.14 mg/dL (04-19-20 @ 11:33)  C-Reactive Protein, Serum: 1.12 mg/dL (04-18-20 @ 19:43)  D-Dimer Assay, Quantitative: <150 ng/mL DDU (04-18-20 @ 19:43)  Ferritin, Serum: 151 ng/mL (04-18-20 @ 19:43)                        18.0   27.13 )-----------( 126      ( 29 Apr 2020 07:28 )             58.3     04-29    149<H>  |  107  |  67<H>  ----------------------------<  181<H>  4.3   |  37<H>  |  1.58<H>    Ca    8.3<L>      29 Apr 2020 07:28  Phos  6.9     04-29  Mg     2.9     04-29                 17.7   14.05 )-----------( 133      ( 28 Apr 2020 06:48 )             54.5     04-28    154<H>  |  114<H>  |  50<H>  ----------------------------<  172<H>  3.8   |  36<H>  |  1.07    Ca    8.6      28 Apr 2020 06:48  Phos  3.2     04-28  Mg     2.8     04-28    COVID-19 PCR . (04.26.20 @ 00:02)    COVID-19 PCR: NotDete      Culture - Blood (collected 26 Apr 2020 01:01)  Source: .Blood None  Preliminary Report (27 Apr 2020 10:02):    No growth to date.    Culture - Blood (collected 26 Apr 2020 00:56)  Source: .Blood None  Preliminary Report (27 Apr 2020 10:02):    No growth to date.    Culture - Sputum (collected 25 Apr 2020 01:00)  Source: .Sputum Sputum  trap  Gram Stain (26 Apr 2020 12:29):    Moderate polymorphonuclear leukocytes per low power field    No Squamous epithelial cells per low power field    Rare Gram Positive Rods per oil power field    Rare Gram Positive Cocci in Pairs and Chains per oil power field  Final Report (28 Apr 2020 10:53):    Few Pseudomonas aeruginosa (Carbapenem Resistant)    Moderate Methicillin resistant Staphylococcus aureus    Normal Respiratory Faith present  Organism: Pseudomonas aeruginosa (Carbapenem Resistant)  Methicillin resistant Staphylococcus aureus (28 Apr 2020 10:44)  Organism: Pseudomonas aeruginosa (Carbapenem Resistant) (28 Apr 2020 10:44)      -  Amikacin: S <=16      -  Aztreonam: R >16      -  Cefepime: R >16      -  Ceftazidime: R >16      -  Ciprofloxacin: S <=0.25      -  Gentamicin: S 4      -  Imipenem: R >8      -  Levofloxacin: S <=0.5      -  Meropenem: R >8      -  Piperacillin/Tazobactam: R >64      -  Tobramycin: S <=2      Method Type: LADY  Organism: Methicillin resistant Staphylococcus aureus (28 Apr 2020 10:40)      -  Amoxicillin/Clavulanic Acid: R >4/2      -  Ampicillin: R >8      -  Ampicillin/Sulbactam: R <=8/4      -  Cefazolin: R >16      -  Ceftriaxone: R >32      -  Ciprofloxacin: R >2      -  Clindamycin: R >4      -  Daptomycin: S 0.5      -  Erythromycin: R >4      -  Gentamicin: S <=1 Should not be used as monotherapy      -  Levofloxacin: R >4      -  Linezolid: S 4      -  Meropenem: R >8      -  Moxifloxacin(Aerobic): R >4      -  Oxacillin: R >2      -  Penicillin: R >8      -  RIF- Rifampin: S <=1 Should not be used as monotherapy      -  Tetra/Doxy: S 2      -  Trimethoprim/Sulfamethoxazole: S <=0.5/9.5      -  Vancomycin: S 2      Method Type: LADY    Culture - Urine (collected 21 Apr 2020 15:51)  Source: .Urine Clean Catch (Midstream)  Final Report (22 Apr 2020 17:03):    <10,000 CFU/mL Normal Urogenital Faith    Culture - Blood (collected 18 Apr 2020 19:43)  Source: .Blood Blood-Peripheral  Final Report (24 Apr 2020 01:01):    No Growth Final    Culture - Sputum . (04.29.20 @ 15:00)    -  Trimethoprim/Sulfamethoxazole: S <=0.5/9.5    -  Vancomycin: S 2    -  Tetra/Doxy: S 2    -  Gentamicin: S <=1 Should not be used as monotherapy    -  Erythromycin: R >4    -  Clindamycin: R >4    -  Cefazolin: R >16    -  Linezolid: S 4    -  Oxacillin: R >2    -  Penicillin: R >8    -  RIF- Rifampin: S <=1 Should not be used as monotherapy    -  Ampicillin/Sulbactam: R <=8/4    Gram Stain:   Numerous polymorphonuclear leukocytes per low power field  No Squamous epithelial cells per low power field  Numerous Gram Positive Cocci in Clusters per oil power field  Few Gram Positive Rods per oil power field  Few Gram Negative Rods per oil power field    Specimen Source: .Sputum Sputum    Culture Results:   Numerous Methicillin resistant Staphylococcus aureus  Normal Respiratory Faith present    Organism Identification: Methicillin resistant Staphylococcus aureus    Organism: Methicillin resistant Staphylococcus aureus    Method Type: LADY        Radiology: all available radiological tests reviewed    < from: Xray Chest 1 View AP/PA. (04.25.20 @ 10:12) >  Percent of LEFT lung opacification: Clear  Percent of RIGHT lung opacification: Clear  Change in lung opacification from most recent x-ray (<=3 days): Stable  Change from prior dated 3 or more days (same admission): No Prior    < end of copied text >    < from: Xray Chest 1 View-PORTABLE IMMEDIATE (04.29.20 @ 07:36) >  Percent of LEFT lung opacification: %  Percent of RIGHT lung opacification: Clear  Change in lung opacification from most recent x-ray (<=3 days): Increase  Change from prior dated 3or more days (same admission): Increase    < end of copied text >      Advanced directives addressed: full resuscitation

## 2020-05-04 LAB
CULTURE RESULTS: SIGNIFICANT CHANGE UP
CULTURE RESULTS: SIGNIFICANT CHANGE UP
HCT VFR BLD CALC: 44 % — SIGNIFICANT CHANGE UP (ref 39–50)
HGB BLD-MCNC: 14.3 G/DL — SIGNIFICANT CHANGE UP (ref 13–17)
MCHC RBC-ENTMCNC: 32.3 PG — SIGNIFICANT CHANGE UP (ref 27–34)
MCHC RBC-ENTMCNC: 32.5 GM/DL — SIGNIFICANT CHANGE UP (ref 32–36)
MCV RBC AUTO: 99.3 FL — SIGNIFICANT CHANGE UP (ref 80–100)
PLATELET # BLD AUTO: SIGNIFICANT CHANGE UP (ref 150–400)
RBC # BLD: 4.43 M/UL — SIGNIFICANT CHANGE UP (ref 4.2–5.8)
RBC # FLD: 12.6 % — SIGNIFICANT CHANGE UP (ref 10.3–14.5)
SPECIMEN SOURCE: SIGNIFICANT CHANGE UP
SPECIMEN SOURCE: SIGNIFICANT CHANGE UP
WBC # BLD: 11.47 K/UL — HIGH (ref 3.8–10.5)
WBC # FLD AUTO: 11.47 K/UL — HIGH (ref 3.8–10.5)

## 2020-05-04 PROCEDURE — 99233 SBSQ HOSP IP/OBS HIGH 50: CPT

## 2020-05-04 PROCEDURE — 99291 CRITICAL CARE FIRST HOUR: CPT

## 2020-05-04 RX ORDER — MULTIVIT WITH MIN/MFOLATE/K2 340-15/3 G
1 POWDER (GRAM) ORAL ONCE
Refills: 0 | Status: COMPLETED | OUTPATIENT
Start: 2020-05-04 | End: 2020-05-04

## 2020-05-04 RX ADMIN — Medication 166.67 MILLIGRAM(S): at 17:56

## 2020-05-04 RX ADMIN — Medication 200 MILLIGRAM(S): at 17:56

## 2020-05-04 RX ADMIN — GABAPENTIN 400 MILLIGRAM(S): 400 CAPSULE ORAL at 05:06

## 2020-05-04 RX ADMIN — GABAPENTIN 400 MILLIGRAM(S): 400 CAPSULE ORAL at 13:20

## 2020-05-04 RX ADMIN — MIDODRINE HYDROCHLORIDE 10 MILLIGRAM(S): 2.5 TABLET ORAL at 13:20

## 2020-05-04 RX ADMIN — HEPARIN SODIUM 5000 UNIT(S): 5000 INJECTION INTRAVENOUS; SUBCUTANEOUS at 13:20

## 2020-05-04 RX ADMIN — DEXMEDETOMIDINE HYDROCHLORIDE IN 0.9% SODIUM CHLORIDE 29.5 MICROGRAM(S)/KG/HR: 4 INJECTION INTRAVENOUS at 03:32

## 2020-05-04 RX ADMIN — CHLORHEXIDINE GLUCONATE 15 MILLILITER(S): 213 SOLUTION TOPICAL at 17:56

## 2020-05-04 RX ADMIN — Medication 100 MILLIGRAM(S): at 11:36

## 2020-05-04 RX ADMIN — CLOPIDOGREL BISULFATE 75 MILLIGRAM(S): 75 TABLET, FILM COATED ORAL at 11:36

## 2020-05-04 RX ADMIN — Medication 5 MILLIGRAM(S): at 05:06

## 2020-05-04 RX ADMIN — Medication 1 MILLIGRAM(S): at 11:36

## 2020-05-04 RX ADMIN — POLYETHYLENE GLYCOL 3350 17 GRAM(S): 17 POWDER, FOR SOLUTION ORAL at 11:36

## 2020-05-04 RX ADMIN — HEPARIN SODIUM 5000 UNIT(S): 5000 INJECTION INTRAVENOUS; SUBCUTANEOUS at 21:06

## 2020-05-04 RX ADMIN — Medication 1 MILLIGRAM(S): at 13:10

## 2020-05-04 RX ADMIN — Medication 1 MILLIGRAM(S): at 06:31

## 2020-05-04 RX ADMIN — SENNA PLUS 2 TABLET(S): 8.6 TABLET ORAL at 21:04

## 2020-05-04 RX ADMIN — Medication 1 MILLIGRAM(S): at 21:17

## 2020-05-04 RX ADMIN — GABAPENTIN 400 MILLIGRAM(S): 400 CAPSULE ORAL at 21:04

## 2020-05-04 RX ADMIN — Medication 1 BOTTLE: at 05:06

## 2020-05-04 RX ADMIN — PANTOPRAZOLE SODIUM 40 MILLIGRAM(S): 20 TABLET, DELAYED RELEASE ORAL at 11:36

## 2020-05-04 RX ADMIN — PROPOFOL 7.07 MICROGRAM(S)/KG/MIN: 10 INJECTION, EMULSION INTRAVENOUS at 03:33

## 2020-05-04 RX ADMIN — MIDODRINE HYDROCHLORIDE 10 MILLIGRAM(S): 2.5 TABLET ORAL at 21:04

## 2020-05-04 RX ADMIN — CHLORHEXIDINE GLUCONATE 15 MILLILITER(S): 213 SOLUTION TOPICAL at 07:20

## 2020-05-04 RX ADMIN — MIDODRINE HYDROCHLORIDE 10 MILLIGRAM(S): 2.5 TABLET ORAL at 05:06

## 2020-05-04 RX ADMIN — Medication 200 MILLIGRAM(S): at 05:05

## 2020-05-04 RX ADMIN — Medication 166.67 MILLIGRAM(S): at 05:05

## 2020-05-04 RX ADMIN — Medication 81 MILLIGRAM(S): at 11:37

## 2020-05-04 RX ADMIN — Medication 15 MILLILITER(S): at 11:36

## 2020-05-04 RX ADMIN — HEPARIN SODIUM 5000 UNIT(S): 5000 INJECTION INTRAVENOUS; SUBCUTANEOUS at 05:06

## 2020-05-04 RX ADMIN — Medication 650 MILLIGRAM(S): at 05:06

## 2020-05-04 NOTE — PROGRESS NOTE ADULT - SUBJECTIVE AND OBJECTIVE BOX
Attempted to call patient's emergency contact Adela with no answer and no option to leave a message. Called patient's next of kin Litzy Boone (daughter) [phone]. Discussed the patient's current condition as well as recent events. Discussed how we will attempt weaning patient safely off the vent. All questions answered.

## 2020-05-04 NOTE — PROGRESS NOTE ADULT - SUBJECTIVE AND OBJECTIVE BOX
Date of service: 05-04-20 @ 13:26    Lying in bed in NAD  Intubated on ventilatory support  Febrile to 102.8F  Lethargic    ROS: unobtainable    MEDICATIONS  (STANDING):  ALBUTerol    90 MICROgram(s) HFA Inhaler 2 Puff(s) Inhalation every 4 hours  aspirin  chewable 81 milliGRAM(s) Oral daily  budesonide 160 MICROgram(s)/formoterol 4.5 MICROgram(s) Inhaler 2 Puff(s) Inhalation two times a day  chlorhexidine 0.12% Liquid 15 milliLiter(s) Oral Mucosa every 12 hours  ciprofloxacin   IVPB 400 milliGRAM(s) IV Intermittent every 12 hours  clopidogrel Tablet 75 milliGRAM(s) Oral daily  dexMEDEtomidine Infusion 1 MICROgram(s)/kG/Hr (29.5 mL/Hr) IV Continuous <Continuous>  folic acid 1 milliGRAM(s) Oral daily  gabapentin 400 milliGRAM(s) Oral three times a day  heparin   Injectable 5000 Unit(s) SubCutaneous every 8 hours  midodrine 10 milliGRAM(s) Oral every 8 hours  multivitamin/minerals/iron Oral Solution (CENTRUM) 15 milliLiter(s) Oral daily  norepinephrine Infusion 0.05 MICROgram(s)/kG/Min (11.1 mL/Hr) IV Continuous <Continuous>  pantoprazole  Injectable 40 milliGRAM(s) IV Push daily  propofol Infusion 10 MICROgram(s)/kG/Min (7.07 mL/Hr) IV Continuous <Continuous>  senna 2 Tablet(s) Oral at bedtime  thiamine 100 milliGRAM(s) Oral daily  tiotropium 18 MICROgram(s) Capsule 1 Capsule(s) Inhalation daily  vancomycin  IVPB 1250 milliGRAM(s) IV Intermittent every 12 hours      Vital Signs Last 24 Hrs  T(C): 37.1 (04 May 2020 09:00), Max: 39.3 (04 May 2020 05:00)  T(F): 98.8 (04 May 2020 09:00), Max: 102.8 (04 May 2020 05:00)  HR: 52 (04 May 2020 09:00) (51 - 86)  BP: 103/48 (04 May 2020 09:00) (94/45 - 125/79)  BP(mean): 60 (04 May 2020 09:00) (56 - 98)  RR: 18 (04 May 2020 09:00) (12 - 30)  SpO2: 95% (04 May 2020 09:00) (93% - 100%)    Mode: AC/ CMV (Assist Control/ Continuous Mandatory Ventilation), RR (machine): 12, TV (machine): 550, FiO2: 30, PEEP: 5, ITime: 1, MAP: 14, PIP: 21    Physical exam:    Constitutional:  No acute distress  HEENT: NC/AT, EOMI, PERRLA, conjunctivae clear  Neck: supple; thyroid not palpable  Back: no tenderness  Respiratory: respiratory effort normal; b/l rhonchi  Cardiovascular: S1S2 regular, no murmurs  Abdomen: soft, not tender, not distended, positive BS  Genitourinary: no suprapubic tenderness  Lymphatic: no LN palpable  Musculoskeletal: no muscle tenderness, no joint swelling or tenderness  Extremities: no pedal edema  Neurological/ Psychiatric: confused; moving all extremities  Skin: no rashes; no palpable lesions    Labs: reviewed                        13.9   13.49 )-----------( 112      ( 04 May 2020 08:54 )             42.2     05-04    142  |  105  |  34<H>  ----------------------------<  141<H>  3.9   |  34<H>  |  0.82    Ca    8.3<L>      04 May 2020 08:54  Phos  3.0     05-04  Mg     2.3     05-04    Vancomycin Level, Trough: 10.1 ug/mL (05-02 @ 16:59)                        15.4   15.99 )-----------( 73       ( 03 May 2020 08:48 )             45.9     05-02    143  |  104  |  45<H>  ----------------------------<  242<H>  3.6   |  34<H>  |  0.75    Ca    8.1<L>      02 May 2020 06:13  Phos  2.3     05-02  Mg     2.4     05-02      Vancomycin Level, Trough: 10.1 ug/mL (05-02 @ 16:59)  Vancomycin Level, Trough: 26.9 ug/mL (05-02 @ 06:13)                        18.0   27.13 )-----------( 126      ( 29 Apr 2020 07:28 )             58.3     04-29    149<H>  |  107  |  67<H>  ----------------------------<  181<H>  4.3   |  37<H>  |  1.58<H>    Ca    8.3<L>      29 Apr 2020 07:28  Phos  6.9     04-29  Mg     2.9     04-29                 17.7   14.05 )-----------( 133      ( 28 Apr 2020 06:48 )             54.5     04-28    154<H>  |  114<H>  |  50<H>  ----------------------------<  172<H>  3.8   |  36<H>  |  1.07    Ca    8.6      28 Apr 2020 06:48  Phos  3.2     04-28  Mg     2.8     04-28    COVID-19 PCR . (04.26.20 @ 00:02)    COVID-19 PCR: NotDetec      Culture - Blood (collected 26 Apr 2020 01:01)  Source: .Blood None  Preliminary Report (27 Apr 2020 10:02):    No growth to date.    Culture - Blood (collected 26 Apr 2020 00:56)  Source: .Blood None  Preliminary Report (27 Apr 2020 10:02):    No growth to date.    Culture - Sputum (collected 25 Apr 2020 01:00)  Source: .Sputum Sputum  trap  Gram Stain (26 Apr 2020 12:29):    Moderate polymorphonuclear leukocytes per low power field    No Squamous epithelial cells per low power field    Rare Gram Positive Rods per oil power field    Rare Gram Positive Cocci in Pairs and Chains per oil power field  Final Report (28 Apr 2020 10:53):    Few Pseudomonas aeruginosa (Carbapenem Resistant)    Moderate Methicillin resistant Staphylococcus aureus    Normal Respiratory Faith present  Organism: Pseudomonas aeruginosa (Carbapenem Resistant)  Methicillin resistant Staphylococcus aureus (28 Apr 2020 10:44)  Organism: Pseudomonas aeruginosa (Carbapenem Resistant) (28 Apr 2020 10:44)      -  Amikacin: S <=16      -  Aztreonam: R >16      -  Cefepime: R >16      -  Ceftazidime: R >16      -  Ciprofloxacin: S <=0.25      -  Gentamicin: S 4      -  Imipenem: R >8      -  Levofloxacin: S <=0.5      -  Meropenem: R >8      -  Piperacillin/Tazobactam: R >64      -  Tobramycin: S <=2      Method Type: LADY  Organism: Methicillin resistant Staphylococcus aureus (28 Apr 2020 10:40)      -  Amoxicillin/Clavulanic Acid: R >4/2      -  Ampicillin: R >8      -  Ampicillin/Sulbactam: R <=8/4      -  Cefazolin: R >16      -  Ceftriaxone: R >32      -  Ciprofloxacin: R >2      -  Clindamycin: R >4      -  Daptomycin: S 0.5      -  Erythromycin: R >4      -  Gentamicin: S <=1 Should not be used as monotherapy      -  Levofloxacin: R >4      -  Linezolid: S 4      -  Meropenem: R >8      -  Moxifloxacin(Aerobic): R >4      -  Oxacillin: R >2      -  Penicillin: R >8      -  RIF- Rifampin: S <=1 Should not be used as monotherapy      -  Tetra/Doxy: S 2      -  Trimethoprim/Sulfamethoxazole: S <=0.5/9.5      -  Vancomycin: S 2      Method Type: LADY    Culture - Urine (collected 21 Apr 2020 15:51)  Source: .Urine Clean Catch (Midstream)  Final Report (22 Apr 2020 17:03):    <10,000 CFU/mL Normal Urogenital Faith    Culture - Blood (collected 18 Apr 2020 19:43)  Source: .Blood Blood-Peripheral  Final Report (24 Apr 2020 01:01):    No Growth Final    Culture - Sputum . (04.29.20 @ 15:00)    -  Trimethoprim/Sulfamethoxazole: S <=0.5/9.5    -  Vancomycin: S 2    -  Tetra/Doxy: S 2    -  Gentamicin: S <=1 Should not be used as monotherapy    -  Erythromycin: R >4    -  Clindamycin: R >4    -  Cefazolin: R >16    -  Linezolid: S 4    -  Oxacillin: R >2    -  Penicillin: R >8    -  RIF- Rifampin: S <=1 Should not be used as monotherapy    -  Ampicillin/Sulbactam: R <=8/4    Gram Stain:   Numerous polymorphonuclear leukocytes per low power field  No Squamous epithelial cells per low power field  Numerous Gram Positive Cocci in Clusters per oil power field  Few Gram Positive Rods per oil power field  Few Gram Negative Rods per oil power field    Specimen Source: .Sputum Sputum    Culture Results:   Numerous Methicillin resistant Staphylococcus aureus  Normal Respiratory Faith present    Organism Identification: Methicillin resistant Staphylococcus aureus    Organism: Methicillin resistant Staphylococcus aureus    Method Type: LADY        Radiology: all available radiological tests reviewed    < from: Xray Chest 1 View AP/PA. (04.25.20 @ 10:12) >  Percent of LEFT lung opacification: Clear  Percent of RIGHT lung opacification: Clear  Change in lung opacification from most recent x-ray (<=3 days): Stable  Change from prior dated 3 or more days (same admission): No Prior    < end of copied text >    < from: Xray Chest 1 View-PORTABLE IMMEDIATE (04.29.20 @ 07:36) >  Percent of LEFT lung opacification: %  Percent of RIGHT lung opacification: Clear  Change in lung opacification from most recent x-ray (<=3 days): Increase  Change from prior dated 3or more days (same admission): Increase    < end of copied text >      Advanced directives addressed: full resuscitation

## 2020-05-04 NOTE — PROGRESS NOTE ADULT - SUBJECTIVE AND OBJECTIVE BOX
Patient seen at a  this morning for AMS and hypoxic  Patient had been admitted with a COPD exacerbation.  It was believed that he went into ETOH withdrawal and had a total of 6MG Ativan over night.  He required intubation this morning.        : he remains intubated.  Will try to wean this afternoon.      : He weaned this morning and failed with a decreased Tv.  But close to extubation.    : Patient weaned well this morning and was extubated.      : Chart reviewed, Patient was reintubated, failed weaning this morning, CXR with improving infiltrates  5/3: Patient remains on the vent failed weaning x3 today with increased RR and low TV, Off Levophed now  : Failed weaning yesterday, had to change his ET tube yesterday because he chewed through it,           PAST MEDICAL HX  Alcohol abuse    Alcohol withdrawal    Anemia    AFIB atrial fibrillation    CHF (congestive heart failure)    Chronic atrial fibrillation    Chronic CHF    Chronic obstructive pulmonary disease (COPD)    Cirrhosis    Collapsed lung    COPD without exacerbation    Emphysema of lung    Falls    GERD (gastroesophageal reflux disease)    HTN hypertension    Meningitis    Poor historian    Presence of Watchman left atrial appendage closure device.    PAST SURGICAL HX  Presence of Watchman left atrial appendage closure device    S/P BKA (below knee amputation) unilateral, left    Unilateral amputation of lower extremity below knee.      FAMILY HX  Family history unknown: Adopted      SOCIAL HX  lives alone  former smoker  alcohol use last 2-3 days ago (2020 03:19)      Allergies    Ceclor (Rash)  Duricef (Rash)    Intolerances            ICU Vital Signs Last 24 Hrs  T(C): 37.1 (04 May 2020 09:00), Max: 39.3 (04 May 2020 05:00)  T(F): 98.8 (04 May 2020 09:00), Max: 102.8 (04 May 2020 05:00)  HR: 52 (04 May 2020 09:00) (51 - 86)  BP: 103/48 (04 May 2020 09:00) (94/45 - 125/79)  BP(mean): 60 (04 May 2020 09:00) (56 - 98)  ABP: --  ABP(mean): --  RR: 18 (04 May 2020 09:00) (12 - 30)  SpO2: 95% (04 May 2020 09:00) (93% - 100%)      Mode: AC/ CMV (Assist Control/ Continuous Mandatory Ventilation)  RR (machine): 12  TV (machine): 550  FiO2: 30  PEEP: 5  ITime: 1  MAP: 14  PIP: 21      I&O's Summary    03 May 2020 07:01  -  04 May 2020 07:00  --------------------------------------------------------  IN: 3924 mL / OUT: 1150 mL / NET: 2774 mL        Date/Time:  @ 08:54  Hematocrit: 42.2 % [39.0 - 50.0]  Hemoglobin: 13.9 g/dL [13.0 - 17.0]  WBC: 13.49 K/uL<H> [3.80 - 10.50]  Platlets: 112 K/uL<L> [150 - 400]  ALT/SGPT: --  Albumin: --  Alk Phos: --  Anion Gap: 3 mmol/L<L> [5 - 17]  AST/SGOT: --  Bilirubin Direct: --  Bilirubin Total: --  BUN: 34 mg/dL<H> [7 - 23]  Ca: 8.3 mg/dL<L> [8.5 - 10.1]  CO2: 34 mmol/L<H> [22 - 31]  Cl: 105 mmol/L [96 - 108]  Creatinine: 0.82 mg/dL [0.50 - 1.30]  eGFR : 109 mL/min/1.73M2  eGFR Non-: 94 mL/min/1.73M2  Glucose: 141 mg/dL<H> [70 - 99]  K: 3.9 mmol/L [3.5 - 5.3]  Protein Total: --  Na: 142 mmol/L [135 - 145]  M.3 mg/dL [1.6 - 2.6]  Phosphorus: 3.0 mg/dL [2.5 - 4.5]  Lactate: --  Date/Time:  @ 06:38  Hematocrit: See note [39.0 - 50.0]  Hemoglobin: See note [13.0 - 17.0]  WBC: Clotted [3.80 - 10.50]  Platlets: Clotted [150 - 400]  ALT/SGPT: --  Albumin: --  Alk Phos: --  Anion Gap: --  AST/SGOT: --  Bilirubin Direct: --  Bilirubin Total: --  BUN: --  Ca: --  CO2: --  Cl: --  Creatinine: --  eGFR : --  eGFR Non-African American: --  Glucose: --  K: --  Protein Total: --  Na: --  Mg: --  Phosphorus: --  Lactate: --  Date/Time:  @ 17:46  Hematocrit: --  Hemoglobin: --  WBC: --  Platlets: --  ALT/SGPT: --  Albumin: --  Alk Phos: --  Anion Gap: 3 mmol/L<L> [5 - 17]  AST/SGOT: --  Bilirubin Direct: --  Bilirubin Total: --  BUN: 35 mg/dL<H> [7 - 23]  Ca: 8.3 mg/dL<L> [8.5 - 10.1]  CO2: 31 mmol/L [22 - 31]  Cl: 107 mmol/L [96 - 108]  Creatinine: 0.56 mg/dL [0.50 - 1.30]  eGFR African American: 128 mL/min/1.73M2  eGFR Non-African American: 110 mL/min/1.73M2  Glucose: 144 mg/dL<H> [70 - 99]  K: 3.9 mmol/L [3.5 - 5.3]  Protein Total: --  Na: 141 mmol/L [135 - 145]  M.0 mg/dL [1.6 - 2.6]  Phosphorus: 3.0 mg/dL [2.5 - 4.5]  Lactate: --            ABG - ( 03 May 2020 05:44 )  pH, Arterial: 7.46  pH, Blood: x     /  pCO2: 44    /  pO2: 69    / HCO3: 31    / Base Excess: 7.1   /  SaO2: 93                  DVT Prophylaxis:    Advanced Directives:  Discussed with:    Visit Information: 30     ** Time is exclusive of billed procedures and/or teaching and/or routine family updates.

## 2020-05-04 NOTE — PROGRESS NOTE ADULT - ASSESSMENT
64 y/o male with h/o chronic A.Fib with Watchman device, CHF, COPD, HTN, obesity was admitted on 4/18 for worsening SOB. In ER he was found with rapid A.fib and was placed on NRB. He tested COVID-19 PCR negative x 3. Noted with hypercapnea and placed on BiPAP, then intubated and placed on ventilatory support. He was extubated on 4/26. He is reported with MDRO's in sputum c/s. He was treated with azithromycin from 4/20 to 4/25.     1. Acute respiratory failure. Febrile syndrome improving. 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 Allergy to PCN and cephalosporines.  -recurrent fever  -leukocytosis is improving  -encephalopathy  -cultures reviewed; repeat sputum c/s shows MRSA and normal respiratory raymundo  -on vancomycin 1 gm IV q12h and cipro 400 mg IV q12h # 7  -tolerating abx well so far; no side effects noted  -repeat vancomycin trough level is rtherapeutic  -respiratory care  -continue IV abx coverage   -weaning trial  -monitor temps  -f/u CBC  -supportive care  2. Other issues:   -care per medicine

## 2020-05-04 NOTE — PROGRESS NOTE ADULT - SUBJECTIVE AND OBJECTIVE BOX
PCP:    REQUESTING PHYSICIAN:    REASON FOR CONSULT:    CHIEF COMPLAINT:    HPI:  65 year old man with a history of chronic AF, Watchman device (implanted 2019), HTN, CVAs, alcoholism, tobacco abuse, COPD, HFpEF (60% 2018), GERD, cirrhosis, left BKA following traumatic injury admitted on 2020 with  acute on chronic hypercapnic & hypoxemic respiratory failure due to suspected exacerbation of COPD and/or bronchitis.  His hospital course has been complicated by encephalopathy and recurrent hypoxia / respiratory failure.    20 Patient complain of wheezing ,no sob at rest , on 4 L NC O2  despite of being on IV lasix ,  heart rate is 80 to 100s    2020:  "I'm still wheezing."  No new complaints.  2020:  Overnight events noted and case discussed with Dr Crews; sedated on vent in CICU.  20  Patient is remain intubated , his heart rate is controlled , rectus sheath hematoma without significant drop in hemoglobin level ,   20: no real changes from yesterday and remains intubated and sedated.   20: Mental status not improved and now extubated.  In rapid atrial fib AM.  Cannot take po due to mental status.    20: Mental status still not improved enough to take medication oral and despite maximum cardizem   20 Patient is lethargic arousble confused , hypertensive , patient had low grade fever did  receive IV Tylenol , patient heart rate is better after starting him on digoxin , and on maximum cardizem drip patient hypernatremic , poor oral intake    20: Hypoxic this AM w/ agonal respirations, emergently intubated.  20:  Sedated on ventilator.  2020:  Sedated with propofol.    20: Sedated, intubated. Afib 70s-80s  5/3/20: Sedated, bradycardic to the 30s Afib  20: Reintubated over night.  HR in 40's likely from presedex.      PAST MEDICAL HX  Alcohol abuse    Alcohol withdrawal    Anemia    AFIB atrial fibrillation    CHF (congestive heart failure)    Chronic atrial fibrillation    Chronic CHF    Chronic obstructive pulmonary disease (COPD)    Cirrhosis    Collapsed lung    COPD without exacerbation    Emphysema of lung    Falls    GERD (gastroesophageal reflux disease)    HTN hypertension    Meningitis    Poor historian    Presence of Watchman left atrial appendage closure device.    PAST SURGICAL HX  Presence of Watchman left atrial appendage closure device    S/P BKA (below knee amputation) unilateral, left    Unilateral amputation of lower extremity below knee.      FAMILY HX  Family history unknown: Adopted      SOCIAL HX  lives alone  former smoker  alcohol use last 2-3 days ago (2020 03:19)      PAST MEDICAL & SURGICAL HISTORY:  Chronic CHF  COPD without exacerbation  Atrial fibrillation  Presence of Watchman left atrial appendage closure device  Hypertension  GERD (gastroesophageal reflux disease)  Chronic obstructive pulmonary disease (COPD)  Anemia  Falls  Meningitis  Collapsed lung  Alcohol withdrawal  Emphysema of lung  Cirrhosis  CHF (congestive heart failure)  Poor historian  Alcohol abuse  Chronic atrial fibrillation  Unilateral amputation of lower extremity below knee  Presence of Watchman left atrial appendage closure device  S/P BKA (below knee amputation) unilateral, left      SOCIAL HISTORY:    FAMILY HISTORY:  No pertinent family history in first degree relatives  Family history unknown: Adopted      ALLERGIES:  Allergies    Ceclor (Rash)  Duricef (Rash)    Intolerances    MEDICATIONS  (STANDING):  ALBUTerol    90 MICROgram(s) HFA Inhaler 2 Puff(s) Inhalation every 4 hours  aspirin  chewable 81 milliGRAM(s) Oral daily  budesonide 160 MICROgram(s)/formoterol 4.5 MICROgram(s) Inhaler 2 Puff(s) Inhalation two times a day  chlorhexidine 0.12% Liquid 15 milliLiter(s) Oral Mucosa every 12 hours  ciprofloxacin   IVPB 400 milliGRAM(s) IV Intermittent every 12 hours  clopidogrel Tablet 75 milliGRAM(s) Oral daily  dexMEDEtomidine Infusion 1 MICROgram(s)/kG/Hr (29.5 mL/Hr) IV Continuous <Continuous>  folic acid 1 milliGRAM(s) Oral daily  gabapentin 400 milliGRAM(s) Oral three times a day  heparin   Injectable 5000 Unit(s) SubCutaneous every 8 hours  midodrine 10 milliGRAM(s) Oral every 8 hours  multivitamin/minerals/iron Oral Solution (CENTRUM) 15 milliLiter(s) Oral daily  norepinephrine Infusion 0.05 MICROgram(s)/kG/Min (11.1 mL/Hr) IV Continuous <Continuous>  pantoprazole  Injectable 40 milliGRAM(s) IV Push daily  propofol Infusion 10 MICROgram(s)/kG/Min (7.07 mL/Hr) IV Continuous <Continuous>  senna 2 Tablet(s) Oral at bedtime  thiamine 100 milliGRAM(s) Oral daily  tiotropium 18 MICROgram(s) Capsule 1 Capsule(s) Inhalation daily  vancomycin  IVPB 1250 milliGRAM(s) IV Intermittent every 12 hours    MEDICATIONS  (PRN):  acetaminophen   Tablet .. 650 milliGRAM(s) Oral every 6 hours PRN Temp greater or equal to 38.5C (101.3F)  LORazepam   Injectable 1 milliGRAM(s) IV Push every 6 hours PRN Agitation or Anxiety  polyethylene glycol 3350 17 Gram(s) Oral daily PRN Constipation      Vital Signs Last 24 Hrs  T(C): 37.1 (04 May 2020 09:00), Max: 39.3 (04 May 2020 05:00)  T(F): 98.8 (04 May 2020 09:00), Max: 102.8 (04 May 2020 05:00)  HR: 52 (04 May 2020 09:00) (51 - 86)  BP: 103/48 (04 May 2020 09:00) (94/45 - 125/79)  BP(mean): 60 (04 May 2020 09:00) (56 - 98)  RR: 18 (04 May 2020 09:00) (12 - 30)  SpO2: 95% (04 May 2020 09:00) (93% - 100%)    I&O's Detail    03 May 2020 07:01  -  04 May 2020 07:00  --------------------------------------------------------  IN:    dexmedetomidine Infusion: 530 mL    Enteral Tube Flush: 800 mL    Free Water: 900 mL    IV PiggyBack: 200 mL    ns in tub fed  myyige18: 1219 mL    propofol Infusion: 275 mL  Total IN: 3924 mL    OUT:    Incontinent per Condom Catheter: 1150 mL  Total OUT: 1150 mL    Total NET: 2774 mL          Daily     Daily Weight in k (04 May 2020 11:24)        PHYSICAL EXAM:    Constitutional: intubated and sedated  HEENT: PERR, EOMI,  No oral cyananosis.  Neck:  supple,  No JVD  Respiratory: Breath sounds are clear bilaterally,   Cardiovascular: S1 and S2,irregular  Gastrointestinal: Bowel Sounds present, soft, nontender.   Extremities: edema, BKA  Vascular: decreased      LABS: All Labs Reviewed:                          13.9   13.49 )-----------( 112      ( 04 May 2020 08:54 )             42.2     05-04    142  |  105  |  34<H>  ----------------------------<  141<H>  3.9   |  34<H>  |  0.82    Ca    8.3<L>      04 May 2020 08:54  Phos  3.0     05-04  Mg     2.3     05-04    Blood Culture: Organism Methicillin resistant Staphylococcus aureus  Gram Stain Blood -- Gram Stain   Numerous polymorphonuclear leukocytes per low power field  No Squamous epithelial cells per low power field  Numerous Gram Positive Cocci in Clusters per oil power field  Few Gram Positive Rods per oil power field  Few Gram Negative Rods per oil power field  Specimen Source .Sputum Sputum  Culture-Blood --    Organism --  Gram Stain Blood -- Gram Stain --  Specimen Source .Blood None  Culture-Blood --            RADIOLOGY/EKG:< from: 12 Lead ECG (20 @ 19:44) >  Diagnosis Line *** Poor data quality, interpretation may be adversely affected  Atrial fibrillation with rapid ventricular response  Rightward axis  Septal infarct (cited on or before 17-AUG-2017)  Abnormal ECG  Confirmed by SESAR SMITH, LINDA ([id number]) on 2020 11:06:37 AM    < end of copied text >        ECHO/CARDIAC CATHTERIZATION/STRESS TEST:  < from: Transthoracic Echocardiogram Follow Up (20 @ 09:10) >  Impression     Summary     Moderate (2+) mitral regurgitation is present.   Normal aortic valve structure and function.   Moderate (2+) tricuspid valve regurgitation is present.   Normal appearing pulmonic valve structure and function.   The left atrium is moderately dilated.   The left ventricle cavity is moderately dilated.   Normal left ventricular systolic function.   Mild concentric left ventricular hypertrophy is present.   Estimated left ventricular ejection fraction is 60-65 %.   The right atrium appears moderately dilated.   Normal appearing right ventricle structure and function.   All visualized extra cardiac structures appears to be normal.   No evidence of pericardial effusion.    < end of copied text >

## 2020-05-04 NOTE — PROGRESS NOTE ADULT - SUBJECTIVE AND OBJECTIVE BOX
Subjective:    pat on vent, re-intubated for chewing the tube.    Home Medications:  albuterol 2.5 mg/3 mL (0.083%) inhalation solution: 3 milliliter(s) inhaled every 6 hours, As Needed -for shortness of breath and/or wheezing (19 Apr 2020 03:12)  gabapentin 400 mg oral capsule: 1 cap(s) orally 3 times a day (19 Apr 2020 03:12)  pantoprazole 40 mg oral granule, enteric coated: 40 milligram(s) orally once a day (19 Apr 2020 03:12)  Spiriva 18 mcg inhalation capsule: 1 cap(s) inhaled once a day (19 Apr 2020 03:12)  tamsulosin 0.4 mg oral capsule: 1 cap(s) orally once a day (at bedtime) (19 Apr 2020 03:12)  traZODone 50 mg oral tablet: 2 tab(s) orally once a day (at bedtime), As Needed (19 Apr 2020 03:12)    MEDICATIONS  (STANDING):  ALBUTerol    90 MICROgram(s) HFA Inhaler 2 Puff(s) Inhalation every 4 hours  aspirin  chewable 81 milliGRAM(s) Oral daily  budesonide 160 MICROgram(s)/formoterol 4.5 MICROgram(s) Inhaler 2 Puff(s) Inhalation two times a day  chlorhexidine 0.12% Liquid 15 milliLiter(s) Oral Mucosa every 12 hours  ciprofloxacin   IVPB 400 milliGRAM(s) IV Intermittent every 12 hours  clopidogrel Tablet 75 milliGRAM(s) Oral daily  dexMEDEtomidine Infusion 1 MICROgram(s)/kG/Hr (29.5 mL/Hr) IV Continuous <Continuous>  folic acid 1 milliGRAM(s) Oral daily  gabapentin 400 milliGRAM(s) Oral three times a day  heparin   Injectable 5000 Unit(s) SubCutaneous every 8 hours  midodrine 10 milliGRAM(s) Oral every 8 hours  multivitamin/minerals/iron Oral Solution (CENTRUM) 15 milliLiter(s) Oral daily  norepinephrine Infusion 0.05 MICROgram(s)/kG/Min (11.1 mL/Hr) IV Continuous <Continuous>  pantoprazole  Injectable 40 milliGRAM(s) IV Push daily  propofol Infusion 10 MICROgram(s)/kG/Min (7.07 mL/Hr) IV Continuous <Continuous>  senna 2 Tablet(s) Oral at bedtime  thiamine 100 milliGRAM(s) Oral daily  tiotropium 18 MICROgram(s) Capsule 1 Capsule(s) Inhalation daily  vancomycin  IVPB 1250 milliGRAM(s) IV Intermittent every 12 hours    MEDICATIONS  (PRN):  acetaminophen   Tablet .. 650 milliGRAM(s) Oral every 6 hours PRN Temp greater or equal to 38.5C (101.3F)  LORazepam   Injectable 1 milliGRAM(s) IV Push every 6 hours PRN Agitation or Anxiety  polyethylene glycol 3350 17 Gram(s) Oral daily PRN Constipation      Allergies    Ceclor (Rash)  Duricef (Rash)    Intolerances        Vital Signs Last 24 Hrs  T(C): 36.7 (04 May 2020 14:00), Max: 39.3 (04 May 2020 05:00)  T(F): 98.1 (04 May 2020 14:00), Max: 102.8 (04 May 2020 05:00)  HR: 48 (04 May 2020 14:00) (48 - 86)  BP: 133/54 (04 May 2020 14:00) (94/45 - 144/55)  BP(mean): 72 (04 May 2020 14:00) (56 - 98)  RR: 16 (04 May 2020 14:00) (12 - 31)  SpO2: 97% (04 May 2020 14:00) (93% - 100%)  Mode: AC/ CMV (Assist Control/ Continuous Mandatory Ventilation)  RR (machine): 12  TV (machine): 550  FiO2: 30  PEEP: 5  ITime: 1  MAP: 14  PIP: 21      PHYSICAL EXAMINATION:    NECK:  Supple. No lymphadenopathy. Jugular venous pressure not elevated. Carotids equal.   HEART:   The cardiac impulse has a normal quality. Reg., Nl S1 and S2.  There are no murmurs, rubs or gallops noted  CHEST:  Chest crackles to auscultation. Normal respiratory effort.  ABDOMEN:  Soft and nontender.   EXTREMITIES:  There is no edema.       LABS:                        13.9   13.49 )-----------( 112      ( 04 May 2020 08:54 )             42.2     05-04    142  |  105  |  34<H>  ----------------------------<  141<H>  3.9   |  34<H>  |  0.82    Ca    8.3<L>      04 May 2020 08:54  Phos  3.0     05-04  Mg     2.3     05-04

## 2020-05-04 NOTE — PROGRESS NOTE ADULT - ASSESSMENT
A/P: 65 male with acute respiratory failure from altered awareness and sedation  COPD  AFIB s/p watchman  CHF  HTN  ETOH abuse      Plan:   CICU    PULM: Wean today.-- ABG fine today on 30% Fi O2    Cardio: Continue ASA, Plavix,     Renal: Cr. Stable    GI: Feeds, PPI    ID: Vanco MRSA PNA    PSY: Ativan 1mg  ATC PRN, Thiamine for chronic ETOH abuse and thiamine deficiency folate    Lovenox DVT Prophylaxis    D/W the patients daughter

## 2020-05-05 LAB
ANION GAP SERPL CALC-SCNC: 6 MMOL/L — SIGNIFICANT CHANGE UP (ref 5–17)
BASE EXCESS BLDA CALC-SCNC: 8.1 MMOL/L — HIGH (ref -2–2)
BUN SERPL-MCNC: 28 MG/DL — HIGH (ref 7–23)
CALCIUM SERPL-MCNC: 8 MG/DL — LOW (ref 8.5–10.1)
CHLORIDE SERPL-SCNC: 98 MMOL/L — SIGNIFICANT CHANGE UP (ref 96–108)
CO2 SERPL-SCNC: 33 MMOL/L — HIGH (ref 22–31)
CREAT SERPL-MCNC: 0.63 MG/DL — SIGNIFICANT CHANGE UP (ref 0.5–1.3)
GAS PNL BLDA: SIGNIFICANT CHANGE UP
GLUCOSE SERPL-MCNC: 232 MG/DL — HIGH (ref 70–99)
HCO3 BLDA-SCNC: 33 MMOL/L — HIGH (ref 21–29)
HCT VFR BLD CALC: 39.4 % — SIGNIFICANT CHANGE UP (ref 39–50)
HGB BLD-MCNC: 13.1 G/DL — SIGNIFICANT CHANGE UP (ref 13–17)
MAGNESIUM SERPL-MCNC: 2 MG/DL — SIGNIFICANT CHANGE UP (ref 1.6–2.6)
MCHC RBC-ENTMCNC: 32.6 PG — SIGNIFICANT CHANGE UP (ref 27–34)
MCHC RBC-ENTMCNC: 33.2 GM/DL — SIGNIFICANT CHANGE UP (ref 32–36)
MCV RBC AUTO: 98 FL — SIGNIFICANT CHANGE UP (ref 80–100)
PCO2 BLDA: 48 MMHG — HIGH (ref 32–46)
PH BLDA: 7.46 — HIGH (ref 7.35–7.45)
PHOSPHATE SERPL-MCNC: 2.7 MG/DL — SIGNIFICANT CHANGE UP (ref 2.5–4.5)
PLATELET # BLD AUTO: 119 K/UL — LOW (ref 150–400)
PO2 BLDA: 72 MMHG — LOW (ref 74–108)
POTASSIUM SERPL-MCNC: 3.8 MMOL/L — SIGNIFICANT CHANGE UP (ref 3.5–5.3)
POTASSIUM SERPL-SCNC: 3.8 MMOL/L — SIGNIFICANT CHANGE UP (ref 3.5–5.3)
RBC # BLD: 4.02 M/UL — LOW (ref 4.2–5.8)
RBC # FLD: 12.4 % — SIGNIFICANT CHANGE UP (ref 10.3–14.5)
SAO2 % BLDA: 94 % — SIGNIFICANT CHANGE UP (ref 92–96)
SODIUM SERPL-SCNC: 137 MMOL/L — SIGNIFICANT CHANGE UP (ref 135–145)
WBC # BLD: 8.45 K/UL — SIGNIFICANT CHANGE UP (ref 3.8–10.5)
WBC # FLD AUTO: 8.45 K/UL — SIGNIFICANT CHANGE UP (ref 3.8–10.5)

## 2020-05-05 PROCEDURE — 99233 SBSQ HOSP IP/OBS HIGH 50: CPT

## 2020-05-05 PROCEDURE — 99291 CRITICAL CARE FIRST HOUR: CPT

## 2020-05-05 RX ORDER — INSULIN LISPRO 100/ML
VIAL (ML) SUBCUTANEOUS EVERY 6 HOURS
Refills: 0 | Status: DISCONTINUED | OUTPATIENT
Start: 2020-05-05 | End: 2020-05-10

## 2020-05-05 RX ADMIN — HEPARIN SODIUM 5000 UNIT(S): 5000 INJECTION INTRAVENOUS; SUBCUTANEOUS at 05:11

## 2020-05-05 RX ADMIN — Medication 166.67 MILLIGRAM(S): at 05:10

## 2020-05-05 RX ADMIN — PROPOFOL 7.07 MICROGRAM(S)/KG/MIN: 10 INJECTION, EMULSION INTRAVENOUS at 14:22

## 2020-05-05 RX ADMIN — Medication 15 MILLILITER(S): at 12:39

## 2020-05-05 RX ADMIN — CHLORHEXIDINE GLUCONATE 15 MILLILITER(S): 213 SOLUTION TOPICAL at 05:11

## 2020-05-05 RX ADMIN — HEPARIN SODIUM 5000 UNIT(S): 5000 INJECTION INTRAVENOUS; SUBCUTANEOUS at 12:40

## 2020-05-05 RX ADMIN — GABAPENTIN 400 MILLIGRAM(S): 400 CAPSULE ORAL at 12:39

## 2020-05-05 RX ADMIN — Medication 166.67 MILLIGRAM(S): at 18:29

## 2020-05-05 RX ADMIN — MIDODRINE HYDROCHLORIDE 10 MILLIGRAM(S): 2.5 TABLET ORAL at 12:40

## 2020-05-05 RX ADMIN — Medication 200 MILLIGRAM(S): at 05:10

## 2020-05-05 RX ADMIN — HEPARIN SODIUM 5000 UNIT(S): 5000 INJECTION INTRAVENOUS; SUBCUTANEOUS at 21:36

## 2020-05-05 RX ADMIN — GABAPENTIN 400 MILLIGRAM(S): 400 CAPSULE ORAL at 21:36

## 2020-05-05 RX ADMIN — Medication 81 MILLIGRAM(S): at 12:38

## 2020-05-05 RX ADMIN — Medication 200 MILLIGRAM(S): at 17:23

## 2020-05-05 RX ADMIN — PROPOFOL 7.07 MICROGRAM(S)/KG/MIN: 10 INJECTION, EMULSION INTRAVENOUS at 05:11

## 2020-05-05 RX ADMIN — DEXMEDETOMIDINE HYDROCHLORIDE IN 0.9% SODIUM CHLORIDE 29.5 MICROGRAM(S)/KG/HR: 4 INJECTION INTRAVENOUS at 12:38

## 2020-05-05 RX ADMIN — Medication 2: at 23:49

## 2020-05-05 RX ADMIN — DEXMEDETOMIDINE HYDROCHLORIDE IN 0.9% SODIUM CHLORIDE 29.5 MICROGRAM(S)/KG/HR: 4 INJECTION INTRAVENOUS at 05:11

## 2020-05-05 RX ADMIN — DEXMEDETOMIDINE HYDROCHLORIDE IN 0.9% SODIUM CHLORIDE 29.5 MICROGRAM(S)/KG/HR: 4 INJECTION INTRAVENOUS at 17:22

## 2020-05-05 RX ADMIN — CLOPIDOGREL BISULFATE 75 MILLIGRAM(S): 75 TABLET, FILM COATED ORAL at 12:38

## 2020-05-05 RX ADMIN — PANTOPRAZOLE SODIUM 40 MILLIGRAM(S): 20 TABLET, DELAYED RELEASE ORAL at 12:39

## 2020-05-05 RX ADMIN — SENNA PLUS 2 TABLET(S): 8.6 TABLET ORAL at 21:36

## 2020-05-05 RX ADMIN — Medication 1 MILLIGRAM(S): at 12:39

## 2020-05-05 RX ADMIN — MIDODRINE HYDROCHLORIDE 10 MILLIGRAM(S): 2.5 TABLET ORAL at 05:11

## 2020-05-05 RX ADMIN — Medication 100 MILLIGRAM(S): at 12:39

## 2020-05-05 RX ADMIN — MIDODRINE HYDROCHLORIDE 10 MILLIGRAM(S): 2.5 TABLET ORAL at 21:36

## 2020-05-05 RX ADMIN — GABAPENTIN 400 MILLIGRAM(S): 400 CAPSULE ORAL at 05:11

## 2020-05-05 RX ADMIN — DEXMEDETOMIDINE HYDROCHLORIDE IN 0.9% SODIUM CHLORIDE 29.5 MICROGRAM(S)/KG/HR: 4 INJECTION INTRAVENOUS at 10:50

## 2020-05-05 RX ADMIN — PROPOFOL 7.07 MICROGRAM(S)/KG/MIN: 10 INJECTION, EMULSION INTRAVENOUS at 10:50

## 2020-05-05 RX ADMIN — CHLORHEXIDINE GLUCONATE 15 MILLILITER(S): 213 SOLUTION TOPICAL at 17:23

## 2020-05-05 NOTE — PROGRESS NOTE ADULT - ASSESSMENT
A/P: 65 male with acute respiratory failure from altered awareness and sedation  COPD  AFIB s/p watchman  CHF  HTN  ETOH abuse    Plan:   PULM: Wean today.-- ABG fine today on 30% Fi O2  Cardio: Continue ASA, Plavix  Renal: Cr. Stable  GI: Feeds, PPI  ID: Vanco, MRSA PNA, blood cultures negative   PSY: Ativan 1mg  ATC PRN, Thiamine for chronic ETOH abuse and thiamine deficiency folate    Lovenox DVT Prophylaxis    D/W the patients daughter A/P: 65 male with acute respiratory failure from altered awareness and sedation  COPD  AFIB s/p watchman  CHF  HTN  ETOH abuse    Plan:   PULM: Failed Wean today low TV and RR in the 40s-- ABG fine today on 30% Fi O2  Cardio: Continue ASA, Plavix  Renal: Cr. Stable  GI: Feeds, PPI  ID: Vanco, MRSA PNA, blood cultures negative   PSY: Ativan 1mg  ATC PRN, Thiamine for chronic ETOH abuse and thiamine deficiency folate    Lovenox DVT Prophylaxis    D/W the patients daughter

## 2020-05-05 NOTE — PROGRESS NOTE ADULT - SUBJECTIVE AND OBJECTIVE BOX
HPI:  65 year old man with a history of chronic AF, Watchman device (implanted 4/2019), HTN, CVAs, alcoholism, tobacco abuse, COPD, HFpEF (60% 12/2018), GERD, cirrhosis, left BKA following traumatic injury admitted on 4/18/2020 with  acute on chronic hypercapnic & hypoxemic respiratory failure due to suspected exacerbation of COPD and/or bronchitis.  His hospital course has been complicated by encephalopathy and recurrent hypoxia / respiratory failure.    4/21/20 Patient complain of wheezing ,no sob at rest , on 4 L NC O2  despite of being on IV lasix ,  heart rate is 80 to 100s    4/22/2020:  "I'm still wheezing."  No new complaints.  4/23/2020:  Overnight events noted and case discussed with Dr Crews; sedated on vent in CICU.  4/24/20  Patient is remain intubated , his heart rate is controlled , rectus sheath hematoma without significant drop in hemoglobin level ,   4/25/20: no real changes from yesterday and remains intubated and sedated.   4/26/20: Mental status not improved and now extubated.  In rapid atrial fib AM.  Cannot take po due to mental status.    4/27/20: Mental status still not improved enough to take medication oral and despite maximum cardizem   4/28/20 Patient is lethargic arousble confused , hypertensive , patient had low grade fever did  receive IV Tylenol , patient heart rate is better after starting him on digoxin , and on maximum cardizem drip patient hypernatremic , poor oral intake    4/29/20: Hypoxic this AM w/ agonal respirations, emergently intubated.  4/30/20:  Sedated on ventilator.  5/1/2020:  Sedated with propofol.    5/2/20: Sedated, intubated. Afib 70s-80s  5/3/20: Sedated, bradycardic to the 30s Afib  5/4/20: Reintubated over night.  HR in 40's likely from presedex.  5/5/20 Patient is intubated sedated , heart rate is controlled  episodes of slow ventricular rate . low grade temp      PAST MEDICAL HX  Alcohol abuse    Alcohol withdrawal    Anemia    AFIB atrial fibrillation    CHF (congestive heart failure)    Chronic atrial fibrillation    Chronic CHF    Chronic obstructive pulmonary disease (COPD)    Cirrhosis    Collapsed lung    COPD without exacerbation    Emphysema of lung    Falls    GERD (gastroesophageal reflux disease)    HTN hypertension    Meningitis    Poor historian    Presence of Watchman left atrial appendage closure device.    PAST SURGICAL HX  Presence of Watchman left atrial appendage closure device    S/P BKA (below knee amputation) unilateral, left    Unilateral amputation of lower extremity below knee.      FAMILY HX  Family history unknown: Adopted      SOCIAL HX  lives alone  former smoker  alcohol use last 2-3 days ago (19 Apr 2020 03:19)      PAST MEDICAL & SURGICAL HISTORY:  Chronic CHF  COPD without exacerbation  Atrial fibrillation  Presence of Watchman left atrial appendage closure device  Hypertension  GERD (gastroesophageal reflux disease)  Chronic obstructive pulmonary disease (COPD)  Anemia  Falls  Meningitis  Collapsed lung  Alcohol withdrawal  Emphysema of lung  Cirrhosis  CHF (congestive heart failure)  Poor historian  Alcohol abuse  Chronic atrial fibrillation  Unilateral amputation of lower extremity below knee  Presence of Watchman left atrial appendage closure device  S/P BKA (below knee amputation) unilateral, left      MEDICATIONS  (STANDING):  ALBUTerol    90 MICROgram(s) HFA Inhaler 2 Puff(s) Inhalation every 4 hours  aspirin  chewable 81 milliGRAM(s) Oral daily  budesonide 160 MICROgram(s)/formoterol 4.5 MICROgram(s) Inhaler 2 Puff(s) Inhalation two times a day  chlorhexidine 0.12% Liquid 15 milliLiter(s) Oral Mucosa every 12 hours  ciprofloxacin   IVPB 400 milliGRAM(s) IV Intermittent every 12 hours  clopidogrel Tablet 75 milliGRAM(s) Oral daily  dexMEDEtomidine Infusion 1 MICROgram(s)/kG/Hr (29.5 mL/Hr) IV Continuous <Continuous>  folic acid 1 milliGRAM(s) Oral daily  gabapentin 400 milliGRAM(s) Oral three times a day  heparin   Injectable 5000 Unit(s) SubCutaneous every 8 hours  midodrine 10 milliGRAM(s) Oral every 8 hours  multivitamin/minerals/iron Oral Solution (CENTRUM) 15 milliLiter(s) Oral daily  norepinephrine Infusion 0.05 MICROgram(s)/kG/Min (11.1 mL/Hr) IV Continuous <Continuous>  pantoprazole  Injectable 40 milliGRAM(s) IV Push daily  propofol Infusion 10 MICROgram(s)/kG/Min (7.07 mL/Hr) IV Continuous <Continuous>  senna 2 Tablet(s) Oral at bedtime  thiamine 100 milliGRAM(s) Oral daily  tiotropium 18 MICROgram(s) Capsule 1 Capsule(s) Inhalation daily  vancomycin  IVPB 1250 milliGRAM(s) IV Intermittent every 12 hours      Vital Signs Last 24 Hrs  T(C): 37.4 (05 May 2020 04:55), Max: 38 (04 May 2020 18:06)  T(F): 99.4 (05 May 2020 04:55), Max: 100.4 (04 May 2020 18:06)  HR: 60 (05 May 2020 07:00) (45 - 84)  BP: 130/57 (05 May 2020 07:00) (90/39 - 144/55)  BP(mean): 73 (05 May 2020 07:00) (51 - 80)  RR: 15 (05 May 2020 07:00) (14 - 31)  SpO2: 97% (05 May 2020 07:00) (91% - 100%)    I&O's Summary    04 May 2020 07:01  -  05 May 2020 07:00  --------------------------------------------------------  IN: 3167 mL / OUT: 1200 mL / NET: 1967 mL          PHYSICAL EXAM:    Constitutional: intubated and sedated  HEENT: PERR, EOMI,  No oral cyananosis.  Neck:  supple,  No JVD  Respiratory: Breath sounds are clear bilaterally,   Cardiovascular: S1 and S2,irregular  Gastrointestinal: Bowel Sounds present, soft, nontender.   Extremities: edema, BKA  Vascular: decreased      LABS: All Labs Reviewed:                          13.9   13.49 )-----------( 112      ( 04 May 2020 08:54 )             42.2     05-04    142  |  105  |  34<H>  ----------------------------<  141<H>  3.9   |  34<H>  |  0.82    Ca    8.3<L>      04 May 2020 08:54  Phos  3.0     05-04  Mg     2.3     05-04        Culture Results:   No growth to date. (05-03-20 @ 17:46)  Culture Results:   No growth to date. (05-03-20 @ 08:48)  Culture Results:   No growth to date. (05-02-20 @ 06:13)      RADIOLOGY/EKG:< from: 12 Lead ECG (04.18.20 @ 19:44) >  Diagnosis Line *** Poor data quality, interpretation may be adversely affected  Atrial fibrillation with rapid ventricular response  Rightward axis  Septal infarct (cited on or before 17-AUG-2017)  Abnormal ECG  Confirmed by LINDA KABA MD ([id number]) on 4/19/2020 11:06:37 AM    < end of copied text >    Monitor  afib     ECHO/CARDIAC CATHTERIZATION/STRESS TEST:  < from: Transthoracic Echocardiogram Follow Up (04.21.20 @ 09:10) >  Impression     Summary     Moderate (2+) mitral regurgitation is present.   Normal aortic valve structure and function.   Moderate (2+) tricuspid valve regurgitation is present.   Normal appearing pulmonic valve structure and function.   The left atrium is moderately dilated.   The left ventricle cavity is moderately dilated.   Normal left ventricular systolic function.   Mild concentric left ventricular hypertrophy is present.   Estimated left ventricular ejection fraction is 60-65 %.   The right atrium appears moderately dilated.   Normal appearing right ventricle structure and function.   All visualized extra cardiac structures appears to be normal.   No evidence of pericardial effusion.    < end of copied text >

## 2020-05-05 NOTE — PROGRESS NOTE ADULT - SUBJECTIVE AND OBJECTIVE BOX
HPI: Patient seen at a  this morning for AMS and hypoxic  Patient had been admitted with a COPD exacerbation.  It was believed that he went into ETOH withdrawal and had a total of 6MG Ativan over night.  He required intubation 4/23.    4/24: he remains intubated.  Will try to wean this afternoon.      4/25: He weaned this morning and failed with a decreased Tv.  But close to extubation.    4/26: Patient weaned well this morning and was extubated.      5/2: Chart reviewed, Patient was reintubated, failed weaning this morning, CXR with improving infiltrates  5/3: Patient remains on the vent failed weaning x3 today with increased RR and low TV, Off Levophed now  5/4: Failed weaning yesterday, had to change his ET tube yesterday because he chewed through it,     5/5: Pt on Precedex and Diprivan for sedation, VENT settings AC12, 550, 30%, PEEP 5+  Trial of weaning planned for today       Vital Signs Last 24 Hrs  T(C): 37.4 (05 May 2020 04:55), Max: 38 (04 May 2020 18:06)  T(F): 99.4 (05 May 2020 04:55), Max: 100.4 (04 May 2020 18:06)  HR: 52 (05 May 2020 10:00) (45 - 84)  BP: 127/54 (05 May 2020 10:00) (90/39 - 144/55)  BP(mean): 72 (05 May 2020 10:00) (51 - 80)  RR: 14 (05 May 2020 10:00) (14 - 31)  SpO2: 98% (05 May 2020 10:00) (91% - 100%)    MEDICATIONS  (STANDING):  ALBUTerol    90 MICROgram(s) HFA Inhaler 2 Puff(s) Inhalation every 4 hours  aspirin  chewable 81 milliGRAM(s) Oral daily  budesonide 160 MICROgram(s)/formoterol 4.5 MICROgram(s) Inhaler 2 Puff(s) Inhalation two times a day  chlorhexidine 0.12% Liquid 15 milliLiter(s) Oral Mucosa every 12 hours  ciprofloxacin   IVPB 400 milliGRAM(s) IV Intermittent every 12 hours  clopidogrel Tablet 75 milliGRAM(s) Oral daily  dexMEDEtomidine Infusion 1 MICROgram(s)/kG/Hr (29.5 mL/Hr) IV Continuous <Continuous>  folic acid 1 milliGRAM(s) Oral daily  gabapentin 400 milliGRAM(s) Oral three times a day  heparin   Injectable 5000 Unit(s) SubCutaneous every 8 hours  insulin lispro (HumaLOG) corrective regimen sliding scale.   SubCutaneous every 6 hours  midodrine 10 milliGRAM(s) Oral every 8 hours  multivitamin/minerals/iron Oral Solution (CENTRUM) 15 milliLiter(s) Oral daily  norepinephrine Infusion 0.05 MICROgram(s)/kG/Min (11.1 mL/Hr) IV Continuous <Continuous>  pantoprazole  Injectable 40 milliGRAM(s) IV Push daily  propofol Infusion 10 MICROgram(s)/kG/Min (7.07 mL/Hr) IV Continuous <Continuous>  senna 2 Tablet(s) Oral at bedtime  thiamine 100 milliGRAM(s) Oral daily  tiotropium 18 MICROgram(s) Capsule 1 Capsule(s) Inhalation daily  vancomycin  IVPB 1250 milliGRAM(s) IV Intermittent every 12 hours    MEDICATIONS  (PRN):  acetaminophen   Tablet .. 650 milliGRAM(s) Oral every 6 hours PRN Temp greater or equal to 38.5C (101.3F)  LORazepam   Injectable 1 milliGRAM(s) IV Push every 6 hours PRN Agitation or Anxiety  polyethylene glycol 3350 17 Gram(s) Oral daily PRN Constipation    PHYSICAL EXAM:  Intubated   Sedated     I&O's Detail  04 May 2020 07:01  -  05 May 2020 07:00  --------------------------------------------------------  IN:    dexmedetomidine Infusion: 377 mL    Free Water: 1200 mL    ns in tub fed  [id number]: 1242 mL    propofol Infusion: 348 mL  Total IN: 3167 mL    OUT:    Incontinent per Condom Catheter: 1200 mL  Total OUT: 1200 mL    Total NET: 1967 mL      LABS:                         13.1   8.45  )-----------( 119      ( 05 May 2020 06:53 )             39.4     05-05    137  |  98  |  28<H>  ----------------------------<  232<H>  3.8   |  33<H>  |  0.63    Ca    8.0<L>      05 May 2020 06:53  Phos  2.7     05-05  Mg     2.0     05-05    ABG - ( 05 May 2020 05:39 )  pH, Arterial: 7.46  pH, Blood: x     /  pCO2: 48    /  pO2: 72    / HCO3: 33    / Base Excess: 8.1   /  SaO2: 94        RADIOLOGY:  < from: Xray Chest 1 View- PORTABLE-Urgent (05.03.20 @ 17:33) >  Impression:  1.  Endotracheal and enteric tubes in appropriate position.  2.  Bibasilar opacities could be on the basis of either atelectasis or pneumonia.      DISCRETE X-RAY DATA:  Percent of LEFT lung opacification: 1-33%  Percent of RIGHT lung opacification: 1-33%  Change in lung opacification from most recent x-ray (<=3 days): Increase  Change from prior dated 3 or more days (same admission): Decrease HPI: Patient seen at a  this morning for AMS and hypoxic  Patient had been admitted with a COPD exacerbation.  It was believed that he went into ETOH withdrawal and had a total of 6MG Ativan over night.  He required intubation 4/23.    4/24: he remains intubated.  Will try to wean this afternoon.      4/25: He weaned this morning and failed with a decreased Tv.  But close to extubation.    4/26: Patient weaned well this morning and was extubated.      5/2: Chart reviewed, Patient was reintubated, failed weaning this morning, CXR with improving infiltrates  5/3: Patient remains on the vent failed weaning x3 today with increased RR and low TV, Off Levophed now  5/4: Failed weaning yesterday, had to change his ET tube yesterday because he chewed through it,     5/5: Pt on Precedex and Diprivan for sedation, PAtient failed weaning again today      Vital Signs Last 24 Hrs  T(C): 37.4 (05 May 2020 04:55), Max: 38 (04 May 2020 18:06)  T(F): 99.4 (05 May 2020 04:55), Max: 100.4 (04 May 2020 18:06)  HR: 52 (05 May 2020 10:00) (45 - 84)  BP: 127/54 (05 May 2020 10:00) (90/39 - 144/55)  BP(mean): 72 (05 May 2020 10:00) (51 - 80)  RR: 14 (05 May 2020 10:00) (14 - 31)  SpO2: 98% (05 May 2020 10:00) (91% - 100%)    MEDICATIONS  (STANDING):  ALBUTerol    90 MICROgram(s) HFA Inhaler 2 Puff(s) Inhalation every 4 hours  aspirin  chewable 81 milliGRAM(s) Oral daily  budesonide 160 MICROgram(s)/formoterol 4.5 MICROgram(s) Inhaler 2 Puff(s) Inhalation two times a day  chlorhexidine 0.12% Liquid 15 milliLiter(s) Oral Mucosa every 12 hours  ciprofloxacin   IVPB 400 milliGRAM(s) IV Intermittent every 12 hours  clopidogrel Tablet 75 milliGRAM(s) Oral daily  dexMEDEtomidine Infusion 1 MICROgram(s)/kG/Hr (29.5 mL/Hr) IV Continuous <Continuous>  folic acid 1 milliGRAM(s) Oral daily  gabapentin 400 milliGRAM(s) Oral three times a day  heparin   Injectable 5000 Unit(s) SubCutaneous every 8 hours  insulin lispro (HumaLOG) corrective regimen sliding scale.   SubCutaneous every 6 hours  midodrine 10 milliGRAM(s) Oral every 8 hours  multivitamin/minerals/iron Oral Solution (CENTRUM) 15 milliLiter(s) Oral daily  norepinephrine Infusion 0.05 MICROgram(s)/kG/Min (11.1 mL/Hr) IV Continuous <Continuous>  pantoprazole  Injectable 40 milliGRAM(s) IV Push daily  propofol Infusion 10 MICROgram(s)/kG/Min (7.07 mL/Hr) IV Continuous <Continuous>  senna 2 Tablet(s) Oral at bedtime  thiamine 100 milliGRAM(s) Oral daily  tiotropium 18 MICROgram(s) Capsule 1 Capsule(s) Inhalation daily  vancomycin  IVPB 1250 milliGRAM(s) IV Intermittent every 12 hours    MEDICATIONS  (PRN):  acetaminophen   Tablet .. 650 milliGRAM(s) Oral every 6 hours PRN Temp greater or equal to 38.5C (101.3F)  LORazepam   Injectable 1 milliGRAM(s) IV Push every 6 hours PRN Agitation or Anxiety  polyethylene glycol 3350 17 Gram(s) Oral daily PRN Constipation    PHYSICAL EXAM:  Intubated   Sedated     I&O's Detail  04 May 2020 07:01  -  05 May 2020 07:00  --------------------------------------------------------  IN:    dexmedetomidine Infusion: 377 mL    Free Water: 1200 mL    ns in tub fed  [id number]: 1242 mL    propofol Infusion: 348 mL  Total IN: 3167 mL    OUT:    Incontinent per Condom Catheter: 1200 mL  Total OUT: 1200 mL    Total NET: 1967 mL      LABS:                         13.1   8.45  )-----------( 119      ( 05 May 2020 06:53 )             39.4     05-05    137  |  98  |  28<H>  ----------------------------<  232<H>  3.8   |  33<H>  |  0.63    Ca    8.0<L>      05 May 2020 06:53  Phos  2.7     05-05  Mg     2.0     05-05    ABG - ( 05 May 2020 05:39 )  pH, Arterial: 7.46  pH, Blood: x     /  pCO2: 48    /  pO2: 72    / HCO3: 33    / Base Excess: 8.1   /  SaO2: 94        RADIOLOGY:  < from: Xray Chest 1 View- PORTABLE-Urgent (05.03.20 @ 17:33) >  Impression:  1.  Endotracheal and enteric tubes in appropriate position.  2.  Bibasilar opacities could be on the basis of either atelectasis or pneumonia.      DISCRETE X-RAY DATA:  Percent of LEFT lung opacification: 1-33%  Percent of RIGHT lung opacification: 1-33%  Change in lung opacification from most recent x-ray (<=3 days): Increase  Change from prior dated 3 or more days (same admission): Decrease

## 2020-05-05 NOTE — PROGRESS NOTE ADULT - SUBJECTIVE AND OBJECTIVE BOX
Subjective:    pat on vent, sedated.    Home Medications:  albuterol 2.5 mg/3 mL (0.083%) inhalation solution: 3 milliliter(s) inhaled every 6 hours, As Needed -for shortness of breath and/or wheezing (19 Apr 2020 03:12)  gabapentin 400 mg oral capsule: 1 cap(s) orally 3 times a day (19 Apr 2020 03:12)  pantoprazole 40 mg oral granule, enteric coated: 40 milligram(s) orally once a day (19 Apr 2020 03:12)  Spiriva 18 mcg inhalation capsule: 1 cap(s) inhaled once a day (19 Apr 2020 03:12)  tamsulosin 0.4 mg oral capsule: 1 cap(s) orally once a day (at bedtime) (19 Apr 2020 03:12)  traZODone 50 mg oral tablet: 2 tab(s) orally once a day (at bedtime), As Needed (19 Apr 2020 03:12)    MEDICATIONS  (STANDING):  ALBUTerol    90 MICROgram(s) HFA Inhaler 2 Puff(s) Inhalation every 4 hours  aspirin  chewable 81 milliGRAM(s) Oral daily  budesonide 160 MICROgram(s)/formoterol 4.5 MICROgram(s) Inhaler 2 Puff(s) Inhalation two times a day  chlorhexidine 0.12% Liquid 15 milliLiter(s) Oral Mucosa every 12 hours  ciprofloxacin   IVPB 400 milliGRAM(s) IV Intermittent every 12 hours  clopidogrel Tablet 75 milliGRAM(s) Oral daily  dexMEDEtomidine Infusion 1 MICROgram(s)/kG/Hr (29.5 mL/Hr) IV Continuous <Continuous>  folic acid 1 milliGRAM(s) Oral daily  gabapentin 400 milliGRAM(s) Oral three times a day  heparin   Injectable 5000 Unit(s) SubCutaneous every 8 hours  insulin lispro (HumaLOG) corrective regimen sliding scale.   SubCutaneous every 6 hours  midodrine 10 milliGRAM(s) Oral every 8 hours  multivitamin/minerals/iron Oral Solution (CENTRUM) 15 milliLiter(s) Oral daily  norepinephrine Infusion 0.05 MICROgram(s)/kG/Min (11.1 mL/Hr) IV Continuous <Continuous>  pantoprazole  Injectable 40 milliGRAM(s) IV Push daily  propofol Infusion 10 MICROgram(s)/kG/Min (7.07 mL/Hr) IV Continuous <Continuous>  senna 2 Tablet(s) Oral at bedtime  thiamine 100 milliGRAM(s) Oral daily  tiotropium 18 MICROgram(s) Capsule 1 Capsule(s) Inhalation daily  vancomycin  IVPB 1250 milliGRAM(s) IV Intermittent every 12 hours    MEDICATIONS  (PRN):  acetaminophen   Tablet .. 650 milliGRAM(s) Oral every 6 hours PRN Temp greater or equal to 38.5C (101.3F)  LORazepam   Injectable 1 milliGRAM(s) IV Push every 6 hours PRN Agitation or Anxiety  polyethylene glycol 3350 17 Gram(s) Oral daily PRN Constipation      Allergies    Ceclor (Rash)  Duricef (Rash)    Intolerances        Vital Signs Last 24 Hrs  T(C): 36.1 (05 May 2020 11:00), Max: 38 (04 May 2020 18:06)  T(F): 97 (05 May 2020 11:00), Max: 100.4 (04 May 2020 18:06)  HR: 50 (05 May 2020 11:00) (45 - 74)  BP: 129/55 (05 May 2020 11:00) (90/39 - 140/55)  BP(mean): 72 (05 May 2020 11:00) (51 - 80)  RR: 15 (05 May 2020 11:00) (14 - 19)  SpO2: 98% (05 May 2020 11:00) (91% - 100%)  Mode: AC/ CMV (Assist Control/ Continuous Mandatory Ventilation)  RR (machine): 12  TV (machine): 550  FiO2: 30  PEEP: 5  ITime: 1  MAP: 14  PIP: 33      PHYSICAL EXAMINATION:    NECK:  Supple. No lymphadenopathy. Jugular venous pressure not elevated. Carotids equal.   HEART:   The cardiac impulse has a normal quality. Reg., Nl S1 and S2.  There are no murmurs, rubs or gallops noted  CHEST:  Chest crackles to auscultation. Normal respiratory effort.  ABDOMEN:  Soft and nontender.   EXTREMITIES:  There is no edema.       LABS:                        13.1   8.45  )-----------( 119      ( 05 May 2020 06:53 )             39.4     05-05    137  |  98  |  28<H>  ----------------------------<  232<H>  3.8   |  33<H>  |  0.63    Ca    8.0<L>      05 May 2020 06:53  Phos  2.7     05-05  Mg     2.0     05-05

## 2020-05-05 NOTE — PROGRESS NOTE ADULT - ASSESSMENT
PROBLEMS;    Ac on chronic hypercapnic & hypoxamic respiratory failure-s/p extubation-worsening ventilation  sputum-Few Pseudomonas aeruginosa (Carbapenem Resistant)/Moderate Methicillin resistant Staphylococcus aureus  afib with RVR  OHS/VIJAY  AC flare of COPD/chronic vs acute on chronic bronchitis  Mild interstitial lung disease-NSIP h/o smoking  COVID negativex2  Pulmonary hypertension  Nonobstructing stone in the left ureterovesicular junction/ nonobstructing stone in the lower pole right kidney.  Subacute hemorrhage in the right rectus abdominis along the anterior sheath, measures 1.6 cm in thickness and extends from the umbilicus to the inferior myotendinous junction  Cirrhosis  Mild splenomegaly-portal venous hypertension.  Chronic afib w Watchman device  CHF  BPH  GERD  ETOH abuse  seizures disorder    PLAN;    vent support-weaning as tolerated  po prednisone  iv percedex  iv levophed-decd  iv vanco/iv cipr0  aerosols  supportive care  covid repeat testing-neg  d/w staff

## 2020-05-06 LAB
BASE EXCESS BLDA CALC-SCNC: 3.1 MMOL/L — HIGH (ref -2–2)
BLOOD GAS COMMENTS ARTERIAL: SIGNIFICANT CHANGE UP
GAS PNL BLDA: SIGNIFICANT CHANGE UP
HCO3 BLDA-SCNC: 30 MMOL/L — HIGH (ref 21–29)
PCO2 BLDA: 59 MMHG — HIGH (ref 32–46)
PH BLDA: 7.32 — LOW (ref 7.35–7.45)
PO2 BLDA: 202 MMHG — HIGH (ref 74–108)
SAO2 % BLDA: 99 % — HIGH (ref 92–96)

## 2020-05-06 PROCEDURE — 99232 SBSQ HOSP IP/OBS MODERATE 35: CPT

## 2020-05-06 PROCEDURE — 71045 X-RAY EXAM CHEST 1 VIEW: CPT | Mod: 26

## 2020-05-06 PROCEDURE — 99291 CRITICAL CARE FIRST HOUR: CPT

## 2020-05-06 RX ORDER — SODIUM CHLORIDE 9 MG/ML
1000 INJECTION INTRAMUSCULAR; INTRAVENOUS; SUBCUTANEOUS ONCE
Refills: 0 | Status: COMPLETED | OUTPATIENT
Start: 2020-05-06 | End: 2020-05-06

## 2020-05-06 RX ORDER — PHENYLEPHRINE HYDROCHLORIDE 10 MG/ML
0.1 INJECTION INTRAVENOUS
Qty: 40 | Refills: 0 | Status: DISCONTINUED | OUTPATIENT
Start: 2020-05-06 | End: 2020-05-09

## 2020-05-06 RX ADMIN — GABAPENTIN 400 MILLIGRAM(S): 400 CAPSULE ORAL at 12:19

## 2020-05-06 RX ADMIN — SODIUM CHLORIDE 1000 MILLILITER(S): 9 INJECTION INTRAMUSCULAR; INTRAVENOUS; SUBCUTANEOUS at 15:30

## 2020-05-06 RX ADMIN — HEPARIN SODIUM 5000 UNIT(S): 5000 INJECTION INTRAVENOUS; SUBCUTANEOUS at 12:19

## 2020-05-06 RX ADMIN — SENNA PLUS 2 TABLET(S): 8.6 TABLET ORAL at 22:14

## 2020-05-06 RX ADMIN — MIDODRINE HYDROCHLORIDE 10 MILLIGRAM(S): 2.5 TABLET ORAL at 13:34

## 2020-05-06 RX ADMIN — Medication 1 MILLIGRAM(S): at 12:20

## 2020-05-06 RX ADMIN — Medication 100 MILLIGRAM(S): at 12:20

## 2020-05-06 RX ADMIN — CHLORHEXIDINE GLUCONATE 15 MILLILITER(S): 213 SOLUTION TOPICAL at 05:23

## 2020-05-06 RX ADMIN — Medication 200 MILLIGRAM(S): at 05:21

## 2020-05-06 RX ADMIN — PANTOPRAZOLE SODIUM 40 MILLIGRAM(S): 20 TABLET, DELAYED RELEASE ORAL at 12:19

## 2020-05-06 RX ADMIN — GABAPENTIN 400 MILLIGRAM(S): 400 CAPSULE ORAL at 22:13

## 2020-05-06 RX ADMIN — MIDODRINE HYDROCHLORIDE 10 MILLIGRAM(S): 2.5 TABLET ORAL at 22:14

## 2020-05-06 RX ADMIN — GABAPENTIN 400 MILLIGRAM(S): 400 CAPSULE ORAL at 05:22

## 2020-05-06 RX ADMIN — CLOPIDOGREL BISULFATE 75 MILLIGRAM(S): 75 TABLET, FILM COATED ORAL at 12:19

## 2020-05-06 RX ADMIN — Medication 166.67 MILLIGRAM(S): at 05:21

## 2020-05-06 RX ADMIN — Medication 166.67 MILLIGRAM(S): at 17:58

## 2020-05-06 RX ADMIN — HEPARIN SODIUM 5000 UNIT(S): 5000 INJECTION INTRAVENOUS; SUBCUTANEOUS at 22:13

## 2020-05-06 RX ADMIN — MIDODRINE HYDROCHLORIDE 10 MILLIGRAM(S): 2.5 TABLET ORAL at 05:25

## 2020-05-06 RX ADMIN — HEPARIN SODIUM 5000 UNIT(S): 5000 INJECTION INTRAVENOUS; SUBCUTANEOUS at 05:22

## 2020-05-06 RX ADMIN — Medication 15 MILLILITER(S): at 12:19

## 2020-05-06 RX ADMIN — Medication 81 MILLIGRAM(S): at 12:19

## 2020-05-06 RX ADMIN — CHLORHEXIDINE GLUCONATE 15 MILLILITER(S): 213 SOLUTION TOPICAL at 17:58

## 2020-05-06 NOTE — PROGRESS NOTE ADULT - SUBJECTIVE AND OBJECTIVE BOX
HPI: Patient seen at a  this morning for AMS and hypoxic  Patient had been admitted with a COPD exacerbation.  It was believed that he went into ETOH withdrawal and had a total of 6MG Ativan over night.  He required intubation 4/23.    4/24: he remains intubated.  Will try to wean this afternoon.      4/25: He weaned this morning and failed with a decreased Tv.  But close to extubation.    4/26: Patient weaned well this morning and was extubated.      5/2: Chart reviewed, Patient was reintubated, failed weaning this morning, CXR with improving infiltrates  5/3: Patient remains on the vent failed weaning x3 today with increased RR and low TV, Off Levophed now  5/4: Failed weaning yesterday, had to change his ET tube yesterday because he chewed through it,     5/5: Pt on Precedex and Diprivan for sedation, Patient failed weaning again today  5/6: Patient failed weaning today with a low TV and High RR      PAST MEDICAL HX  Alcohol abuse    Alcohol withdrawal    Anemia    AFIB atrial fibrillation    CHF (congestive heart failure)    Chronic atrial fibrillation    Chronic CHF    Chronic obstructive pulmonary disease (COPD)    Cirrhosis    Collapsed lung    COPD without exacerbation    Emphysema of lung    Falls    GERD (gastroesophageal reflux disease)    HTN hypertension    Meningitis    Poor historian    Presence of Watchman left atrial appendage closure device.    PAST SURGICAL HX  Presence of Watchman left atrial appendage closure device    S/P BKA (below knee amputation) unilateral, left    Unilateral amputation of lower extremity below knee.      FAMILY HX  Family history unknown: Adopted      SOCIAL HX  lives alone  former smoker  alcohol use last 2-3 days ago (19 Apr 2020 03:19)       PAST MEDICAL & SURGICAL HISTORY:  Chronic CHF  COPD without exacerbation  Atrial fibrillation  Presence of Watchman left atrial appendage closure device  Hypertension  GERD (gastroesophageal reflux disease)  Chronic obstructive pulmonary disease (COPD)  Anemia  Falls  Meningitis  Collapsed lung  Alcohol withdrawal  Emphysema of lung  Cirrhosis  CHF (congestive heart failure)  Poor historian  Alcohol abuse  Chronic atrial fibrillation  Unilateral amputation of lower extremity below knee  Presence of Watchman left atrial appendage closure device  S/P BKA (below knee amputation) unilateral, left      FAMILY HISTORY:  No pertinent family history in first degree relatives  Family history unknown: Adopted      Social Hx:    Allergies    Ceclor (Rash)  Duricef (Rash)    Intolerances            ICU Vital Signs Last 24 Hrs  T(C): 38.3 (06 May 2020 11:00), Max: 38.3 (06 May 2020 11:00)  T(F): 101 (06 May 2020 11:00), Max: 101 (06 May 2020 11:00)  HR: 115 (06 May 2020 13:00) (40 - 127)  BP: 147/76 (06 May 2020 13:00) (103/53 - 150/74)  BP(mean): 90 (06 May 2020 13:00) (58 - 90)  ABP: --  ABP(mean): --  RR: 23 (06 May 2020 13:00) (14 - 23)  SpO2: 98% (06 May 2020 13:00) (94% - 99%)      Mode: AC/ CMV (Assist Control/ Continuous Mandatory Ventilation)  RR (machine): 12  TV (machine): 550  FiO2: 30  PEEP: 5  ITime: 1  MAP: 18  PIP: 30      I&O's Summary    05 May 2020 07:01  -  06 May 2020 07:00  --------------------------------------------------------  IN: 3577.6 mL / OUT: 1915 mL / NET: 1662.6 mL    06 May 2020 07:01  -  06 May 2020 13:21  --------------------------------------------------------  IN: 688.5 mL / OUT: 400 mL / NET: 288.5 mL                              13.1   8.45  )-----------( 119      ( 05 May 2020 06:53 )             39.4       05-05    137  |  98  |  28<H>  ----------------------------<  232<H>  3.8   |  33<H>  |  0.63    Ca    8.0<L>      05 May 2020 06:53  Phos  2.7     05-05  Mg     2.0     05-05              ABG - ( 05 May 2020 05:39 )  pH, Arterial: 7.46  pH, Blood: x     /  pCO2: 48    /  pO2: 72    / HCO3: 33    / Base Excess: 8.1   /  SaO2: 94                      MEDICATIONS  (STANDING):  ALBUTerol    90 MICROgram(s) HFA Inhaler 2 Puff(s) Inhalation every 4 hours  aspirin  chewable 81 milliGRAM(s) Oral daily  budesonide 160 MICROgram(s)/formoterol 4.5 MICROgram(s) Inhaler 2 Puff(s) Inhalation two times a day  chlorhexidine 0.12% Liquid 15 milliLiter(s) Oral Mucosa every 12 hours  clopidogrel Tablet 75 milliGRAM(s) Oral daily  dexMEDEtomidine Infusion 1 MICROgram(s)/kG/Hr (29.5 mL/Hr) IV Continuous <Continuous>  folic acid 1 milliGRAM(s) Oral daily  gabapentin 400 milliGRAM(s) Oral three times a day  heparin   Injectable 5000 Unit(s) SubCutaneous every 8 hours  insulin lispro (HumaLOG) corrective regimen sliding scale.   SubCutaneous every 6 hours  midodrine 10 milliGRAM(s) Oral every 8 hours  multivitamin/minerals/iron Oral Solution (CENTRUM) 15 milliLiter(s) Oral daily  norepinephrine Infusion 0.05 MICROgram(s)/kG/Min (11.1 mL/Hr) IV Continuous <Continuous>  pantoprazole  Injectable 40 milliGRAM(s) IV Push daily  propofol Infusion 10 MICROgram(s)/kG/Min (7.07 mL/Hr) IV Continuous <Continuous>  senna 2 Tablet(s) Oral at bedtime  thiamine 100 milliGRAM(s) Oral daily  tiotropium 18 MICROgram(s) Capsule 1 Capsule(s) Inhalation daily  vancomycin  IVPB 1250 milliGRAM(s) IV Intermittent every 12 hours    MEDICATIONS  (PRN):  acetaminophen   Tablet .. 650 milliGRAM(s) Oral every 6 hours PRN Temp greater or equal to 38.5C (101.3F)  LORazepam   Injectable 1 milliGRAM(s) IV Push every 6 hours PRN Agitation or Anxiety  polyethylene glycol 3350 17 Gram(s) Oral daily PRN Constipation      DVT Prophylaxis:    Advanced Directives:  Discussed with:    Visit Information: 30 min    ** Time is exclusive of billed procedures and/or teaching and/or routine family updates.

## 2020-05-06 NOTE — PROGRESS NOTE ADULT - ASSESSMENT
PROBLEMS;    Ac on chronic hypercapnic & hypoxamic respiratory failure-s/p extubation-worsening ventilation  sputum-Few Pseudomonas aeruginosa (Carbapenem Resistant)/Moderate Methicillin resistant Staphylococcus aureus  afib with RVR  OHS/VIJAY  AC flare of COPD/chronic vs acute on chronic bronchitis  Mild interstitial lung disease-NSIP h/o smoking  COVID negativex2  Pulmonary hypertension  Nonobstructing stone in the left ureterovesicular junction/ nonobstructing stone in the lower pole right kidney.  Subacute hemorrhage in the right rectus abdominis along the anterior sheath, measures 1.6 cm in thickness and extends from the umbilicus to the inferior myotendinous junction  Cirrhosis  Mild splenomegaly-portal venous hypertension.  Chronic afib w Watchman device  CHF  BPH  GERD  ETOH abuse  seizures disorder    PLAN;    vent support may need a trach  iv percedex  iv vanco  aerosols  supportive care  covid repeat testing-neg  d/w staff

## 2020-05-06 NOTE — PROGRESS NOTE ADULT - SUBJECTIVE AND OBJECTIVE BOX
Date of service: 05-06-20 @ 12:42    Intubated on ventilatory support  Arousable  Febrile to 101F    ROS: unobtainable    MEDICATIONS  (STANDING):  ALBUTerol    90 MICROgram(s) HFA Inhaler 2 Puff(s) Inhalation every 4 hours  aspirin  chewable 81 milliGRAM(s) Oral daily  budesonide 160 MICROgram(s)/formoterol 4.5 MICROgram(s) Inhaler 2 Puff(s) Inhalation two times a day  chlorhexidine 0.12% Liquid 15 milliLiter(s) Oral Mucosa every 12 hours  clopidogrel Tablet 75 milliGRAM(s) Oral daily  dexMEDEtomidine Infusion 1 MICROgram(s)/kG/Hr (29.5 mL/Hr) IV Continuous <Continuous>  folic acid 1 milliGRAM(s) Oral daily  gabapentin 400 milliGRAM(s) Oral three times a day  heparin   Injectable 5000 Unit(s) SubCutaneous every 8 hours  insulin lispro (HumaLOG) corrective regimen sliding scale.   SubCutaneous every 6 hours  midodrine 10 milliGRAM(s) Oral every 8 hours  multivitamin/minerals/iron Oral Solution (CENTRUM) 15 milliLiter(s) Oral daily  norepinephrine Infusion 0.05 MICROgram(s)/kG/Min (11.1 mL/Hr) IV Continuous <Continuous>  pantoprazole  Injectable 40 milliGRAM(s) IV Push daily  propofol Infusion 10 MICROgram(s)/kG/Min (7.07 mL/Hr) IV Continuous <Continuous>  senna 2 Tablet(s) Oral at bedtime  thiamine 100 milliGRAM(s) Oral daily  tiotropium 18 MICROgram(s) Capsule 1 Capsule(s) Inhalation daily  vancomycin  IVPB 1250 milliGRAM(s) IV Intermittent every 12 hours      Vital Signs Last 24 Hrs  T(C): 38.3 (06 May 2020 11:00), Max: 38.3 (06 May 2020 11:00)  T(F): 101 (06 May 2020 11:00), Max: 101 (06 May 2020 11:00)  HR: 118 (06 May 2020 12:00) (40 - 118)  BP: 133/65 (06 May 2020 12:00) (77/42 - 150/74)  BP(mean): 83 (06 May 2020 12:00) (48 - 90)  RR: 19 (06 May 2020 12:00) (12 - 21)  SpO2: 95% (06 May 2020 12:00) (95% - 100%)    Mode: AC/ CMV (Assist Control/ Continuous Mandatory Ventilation), RR (machine): 12, TV (machine): 550, FiO2: 30, PEEP: 5, ITime: 1, MAP: 12, PIP: 28    Physical exam:    Constitutional:  No acute distress  HEENT: NC/AT, EOMI, PERRLA, conjunctivae clear  Neck: supple; thyroid not palpable  Back: no tenderness  Respiratory: respiratory effort normal; b/l rhonchi  Cardiovascular: S1S2 regular, no murmurs  Abdomen: soft, not tender, not distended, positive BS  Genitourinary: no suprapubic tenderness  Lymphatic: no LN palpable  Musculoskeletal: no muscle tenderness, no joint swelling or tenderness  Extremities: no pedal edema  Neurological/ Psychiatric: confused; moving all extremities  Skin: no rashes; no palpable lesions    Labs: reviewed                        13.1   8.45  )-----------( 119      ( 05 May 2020 06:53 )             39.4     05-05    137  |  98  |  28<H>  ----------------------------<  232<H>  3.8   |  33<H>  |  0.63    Ca    8.0<L>      05 May 2020 06:53  Phos  2.7     05-05  Mg     2.0     05-05      Vancomycin Level, Trough: 10.1 ug/mL (05-02 @ 16:59)                        15.4   15.99 )-----------( 73       ( 03 May 2020 08:48 )             45.9     05-02    143  |  104  |  45<H>  ----------------------------<  242<H>  3.6   |  34<H>  |  0.75    Ca    8.1<L>      02 May 2020 06:13  Phos  2.3     05-02  Mg     2.4     05-02      Vancomycin Level, Trough: 10.1 ug/mL (05-02 @ 16:59)  Vancomycin Level, Trough: 26.9 ug/mL (05-02 @ 06:13)                        18.0   27.13 )-----------( 126      ( 29 Apr 2020 07:28 )             58.3     04-29    149<H>  |  107  |  67<H>  ----------------------------<  181<H>  4.3   |  37<H>  |  1.58<H>    Ca    8.3<L>      29 Apr 2020 07:28  Phos  6.9     04-29  Mg     2.9     04-29                 17.7   14.05 )-----------( 133      ( 28 Apr 2020 06:48 )             54.5     04-28    154<H>  |  114<H>  |  50<H>  ----------------------------<  172<H>  3.8   |  36<H>  |  1.07    Ca    8.6      28 Apr 2020 06:48  Phos  3.2     04-28  Mg     2.8     04-28    COVID-19 PCR . (04.26.20 @ 00:02)    COVID-19 PCR: NotDetec      Culture - Blood (collected 26 Apr 2020 01:01)  Source: .Blood None  Preliminary Report (27 Apr 2020 10:02):    No growth to date.    Culture - Blood (collected 26 Apr 2020 00:56)  Source: .Blood None  Preliminary Report (27 Apr 2020 10:02):    No growth to date.    Culture - Sputum (collected 25 Apr 2020 01:00)  Source: .Sputum Sputum  trap  Gram Stain (26 Apr 2020 12:29):    Moderate polymorphonuclear leukocytes per low power field    No Squamous epithelial cells per low power field    Rare Gram Positive Rods per oil power field    Rare Gram Positive Cocci in Pairs and Chains per oil power field  Final Report (28 Apr 2020 10:53):    Few Pseudomonas aeruginosa (Carbapenem Resistant)    Moderate Methicillin resistant Staphylococcus aureus    Normal Respiratory Faith present  Organism: Pseudomonas aeruginosa (Carbapenem Resistant)  Methicillin resistant Staphylococcus aureus (28 Apr 2020 10:44)  Organism: Pseudomonas aeruginosa (Carbapenem Resistant) (28 Apr 2020 10:44)      -  Amikacin: S <=16      -  Aztreonam: R >16      -  Cefepime: R >16      -  Ceftazidime: R >16      -  Ciprofloxacin: S <=0.25      -  Gentamicin: S 4      -  Imipenem: R >8      -  Levofloxacin: S <=0.5      -  Meropenem: R >8      -  Piperacillin/Tazobactam: R >64      -  Tobramycin: S <=2      Method Type: LADY  Organism: Methicillin resistant Staphylococcus aureus (28 Apr 2020 10:40)      -  Amoxicillin/Clavulanic Acid: R >4/2      -  Ampicillin: R >8      -  Ampicillin/Sulbactam: R <=8/4      -  Cefazolin: R >16      -  Ceftriaxone: R >32      -  Ciprofloxacin: R >2      -  Clindamycin: R >4      -  Daptomycin: S 0.5      -  Erythromycin: R >4      -  Gentamicin: S <=1 Should not be used as monotherapy      -  Levofloxacin: R >4      -  Linezolid: S 4      -  Meropenem: R >8      -  Moxifloxacin(Aerobic): R >4      -  Oxacillin: R >2      -  Penicillin: R >8      -  RIF- Rifampin: S <=1 Should not be used as monotherapy      -  Tetra/Doxy: S 2      -  Trimethoprim/Sulfamethoxazole: S <=0.5/9.5      -  Vancomycin: S 2      Method Type: LADY    Culture - Urine (collected 21 Apr 2020 15:51)  Source: .Urine Clean Catch (Midstream)  Final Report (22 Apr 2020 17:03):    <10,000 CFU/mL Normal Urogenital Faith    Culture - Blood (collected 18 Apr 2020 19:43)  Source: .Blood Blood-Peripheral  Final Report (24 Apr 2020 01:01):    No Growth Final    Culture - Sputum . (04.29.20 @ 15:00)    -  Trimethoprim/Sulfamethoxazole: S <=0.5/9.5    -  Vancomycin: S 2    -  Tetra/Doxy: S 2    -  Gentamicin: S <=1 Should not be used as monotherapy    -  Erythromycin: R >4    -  Clindamycin: R >4    -  Cefazolin: R >16    -  Linezolid: S 4    -  Oxacillin: R >2    -  Penicillin: R >8    -  RIF- Rifampin: S <=1 Should not be used as monotherapy    -  Ampicillin/Sulbactam: R <=8/4    Gram Stain:   Numerous polymorphonuclear leukocytes per low power field  No Squamous epithelial cells per low power field  Numerous Gram Positive Cocci in Clusters per oil power field  Few Gram Positive Rods per oil power field  Few Gram Negative Rods per oil power field    Specimen Source: .Sputum Sputum    Culture Results:   Numerous Methicillin resistant Staphylococcus aureus  Normal Respiratory Faith present    Organism Identification: Methicillin resistant Staphylococcus aureus    Organism: Methicillin resistant Staphylococcus aureus    Method Type: LADY        Radiology: all available radiological tests reviewed    < from: Xray Chest 1 View AP/PA. (04.25.20 @ 10:12) >  Percent of LEFT lung opacification: Clear  Percent of RIGHT lung opacification: Clear  Change in lung opacification from most recent x-ray (<=3 days): Stable  Change from prior dated 3 or more days (same admission): No Prior    < end of copied text >    < from: Xray Chest 1 View-PORTABLE IMMEDIATE (04.29.20 @ 07:36) >  Percent of LEFT lung opacification: %  Percent of RIGHT lung opacification: Clear  Change in lung opacification from most recent x-ray (<=3 days): Increase  Change from prior dated 3or more days (same admission): Increase    < end of copied text >      Advanced directives addressed: full resuscitation

## 2020-05-06 NOTE — PROGRESS NOTE ADULT - PROBLEM SELECTOR PROBLEM 3
COPD exacerbation
Acute on chronic congestive heart failure, unspecified heart failure type
Chronic atrial fibrillation
Hypertension
Acute on chronic congestive heart failure, unspecified heart failure type

## 2020-05-06 NOTE — PROGRESS NOTE ADULT - PROBLEM SELECTOR PLAN 4
Fair control.
Fair control.
Improved.
Persistent wheezing and dyspnea; receiving methylPREDNISolone, azithromycin, albuterol, tiotropium.
Respiratory failure associated with altered mental status and possible etOH withdrawal and  pneumonia; vent management as per critical care.
Respiratory failure associated with altered mental status and possible etOH withdrawal and pneumonia; poor weaning from vent to date.
Respiratory failure associated with altered mental status and possible etOH withdrawal and possible pneumonia; vent management as per critical care.
Respiratory failure associated with altered mental status and possible etOH withdrawal; vent management as per critical care.
uncontrolled , would give hydralazine 25 mg po q 6 hours
Hypotensive this PM, d/c hydralazine.

## 2020-05-06 NOTE — PROGRESS NOTE ADULT - PROBLEM SELECTOR PROBLEM 4
Chronic atrial fibrillation
Acute on chronic respiratory failure with hypoxia and hypercapnia
Hypertension
Respiratory failure with hypoxia and hypercapnia, unspecified chronicity
Hypertension

## 2020-05-06 NOTE — PROGRESS NOTE ADULT - SUBJECTIVE AND OBJECTIVE BOX
REASON FOR VISIT: AF    HPI:  65 year old man with a history of chronic AF, Watchman device (implanted 4/2019), HTN, CVAs, alcoholism, tobacco abuse, COPD, HFpEF (60% 12/2018), GERD, cirrhosis, left BKA following traumatic injury admitted on 4/18/2020 with  acute on chronic hypercapnic & hypoxemic respiratory failure due to suspected exacerbation of COPD and/or bronchitis.  His hospital course has been complicated by encephalopathy and recurrent hypoxia / respiratory failure.    4/21/20 Patient complain of wheezing ,no sob at rest , on 4 L NC O2  despite of being on IV lasix ,  heart rate is 80 to 100s    4/22/2020:  "I'm still wheezing."  No new complaints.  4/23/2020:  Overnight events noted and case discussed with Dr Crews; sedated on vent in CICU.  4/24/20  Patient is remain intubated , his heart rate is controlled , rectus sheath hematoma without significant drop in hemoglobin level ,   4/25/20: no real changes from yesterday and remains intubated and sedated.   4/26/20: Mental status not improved and now extubated.  In rapid atrial fib AM.  Cannot take po due to mental status.    4/27/20: Mental status still not improved enough to take medication oral and despite maximum cardizem   4/28/20 Patient is lethargic arousble confused , hypertensive , patient had low grade fever did  receive IV Tylenol , patient heart rate is better after starting him on digoxin , and on maximum cardizem drip patient hypernatremic , poor oral intake    4/29/20: Hypoxic this AM w/ agonal respirations, emergently intubated.  4/30/20:  Sedated on ventilator.  5/1/2020:  Sedated with propofol.    5/2/20: Sedated, intubated. Afib 70s-80s  5/3/20: Sedated, bradycardic to the 30s Afib  5/4/20: Reintubated over night.  HR in 40's likely from presedex.  5/5/20 Patient is intubated sedated , heart rate is controlled  episodes of slow ventricular rate . low grade temp  5/6/2020:  Sedated (Propofol and Precedex).    CARDIAC MEDICATIONS  (STANDING):  aspirin  chewable 81 milliGRAM(s) Oral daily  clopidogrel Tablet 75 milliGRAM(s) Oral daily  heparin   Injectable 5000 Unit(s) SubCutaneous every 8 hours  midodrine 10 milliGRAM(s) Oral every 8 hours    Vital Signs Last 24 Hrs  T(C): 38.3 (06 May 2020 11:00), Max: 38.3 (06 May 2020 11:00)  T(F): 101 (06 May 2020 11:00), Max: 101 (06 May 2020 11:00)  HR: 115 (06 May 2020 13:00) (40 - 127)  BP: 147/76 (06 May 2020 13:00) (103/53 - 150/74)  BP(mean): 90 (06 May 2020 13:00) (58 - 90)  RR: 23 (06 May 2020 13:00) (14 - 23)  SpO2: 98% (06 May 2020 13:00) (94% - 99%)    PHYSICAL EXAM:  Constitutional: intubated and sedated / unresponsive on vent  HEENT: Eyes open, ETT to vent  Respiratory: Coarse breath sounds with rhonchi  Cardiovascular: Irregular, normal S2  Gastrointestinal: Bowel Sounds present, abdomen is obese, soft.   Extremities:  L BKA    LABS:                       13.1   8.45  )-----------( 119      ( 05 May 2020 06:53 )             39.4     137  |  98  |  28<H>  ----------------------------<  232<H>  3.8   |  33<H>  |  0.63    Ca    8.0<L>      05 May 2020 06:53  Phos  2.7     05-05  Mg     2.0     05-05    Transthoracic Echocardiogram Follow Up (04.21.20 @ 09:10):   Moderate (2+) mitral regurgitation.   Moderate (2+) tricuspid valve regurgitation.   The left atrium is moderately dilated.   The left ventricle cavity is moderately dilated.   Normal left ventricular systolic function. Mild concentric left ventricular hypertrophy is present. Estimated left ventricular ejection fraction is 60-65 %.   The right atrium appears moderately dilated.   Normal appearing right ventricle structure and function.

## 2020-05-06 NOTE — PROGRESS NOTE ADULT - ASSESSMENT
A/P: 65 male with acute respiratory failure from altered awareness and sedation  COPD  AFIB s/p watchman  CHF  HTN  ETOH abuse    Plan:   PULM: Failed Wean today low TV and RR in the 40s-- ABG fine today on 30% Fi O2,  Will D/W the patient daughter about a Trach again  Cardio: Continue ASA, Plavix  Renal: Cr. Stable  GI: Feeds, PPI  ID: Vanco, off cipro MRSA PNA, blood cultures negative   PSY: Ativan 1mg  ATC PRN, Thiamine for chronic ETOH abuse and thiamine deficiency folate    Lovenox DVT Prophylaxis    D/W the patients daughter

## 2020-05-06 NOTE — PROGRESS NOTE ADULT - ASSESSMENT
66 y/o male with h/o chronic A.Fib with Watchman device, CHF, COPD, HTN, obesity was admitted on 4/18 for worsening SOB. In ER he was found with rapid A.fib and was placed on NRB. He tested COVID-19 PCR negative x 3. Noted with hypercapnea and placed on BiPAP, then intubated and placed on ventilatory support. He was extubated on 4/26. He is reported with MDRO's in sputum c/s. He was treated with azithromycin from 4/20 to 4/25.     1. Acute respiratory failure. Febrile syndrome improving. 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 Allergy to PCN and cephalosporines.  -still febrile  -leukocytosis is resolving  -encephalopathy imroving  -cultures reviewed; repeat sputum c/s shows MRSA and normal respiratory raymundo  -on vancomycin 1 gm IV q12h and cipro 400 mg IV q12h # 9  -tolerating abx well so far; no side effects noted  -d/c cipro  -respiratory care  -continue IV abx coverage with vancomycin  -weaning trial  -monitor temps  -f/u CBC  -supportive care  2. Other issues:   -care per medicine

## 2020-05-06 NOTE — PROGRESS NOTE ADULT - SUBJECTIVE AND OBJECTIVE BOX
Subjective:    pat on vent, sedated, not tolerating weaning.    Home Medications:  albuterol 2.5 mg/3 mL (0.083%) inhalation solution: 3 milliliter(s) inhaled every 6 hours, As Needed -for shortness of breath and/or wheezing (19 Apr 2020 03:12)  gabapentin 400 mg oral capsule: 1 cap(s) orally 3 times a day (19 Apr 2020 03:12)  pantoprazole 40 mg oral granule, enteric coated: 40 milligram(s) orally once a day (19 Apr 2020 03:12)  Spiriva 18 mcg inhalation capsule: 1 cap(s) inhaled once a day (19 Apr 2020 03:12)  tamsulosin 0.4 mg oral capsule: 1 cap(s) orally once a day (at bedtime) (19 Apr 2020 03:12)  traZODone 50 mg oral tablet: 2 tab(s) orally once a day (at bedtime), As Needed (19 Apr 2020 03:12)    MEDICATIONS  (STANDING):  ALBUTerol    90 MICROgram(s) HFA Inhaler 2 Puff(s) Inhalation every 4 hours  aspirin  chewable 81 milliGRAM(s) Oral daily  budesonide 160 MICROgram(s)/formoterol 4.5 MICROgram(s) Inhaler 2 Puff(s) Inhalation two times a day  chlorhexidine 0.12% Liquid 15 milliLiter(s) Oral Mucosa every 12 hours  clopidogrel Tablet 75 milliGRAM(s) Oral daily  dexMEDEtomidine Infusion 1 MICROgram(s)/kG/Hr (29.5 mL/Hr) IV Continuous <Continuous>  folic acid 1 milliGRAM(s) Oral daily  gabapentin 400 milliGRAM(s) Oral three times a day  heparin   Injectable 5000 Unit(s) SubCutaneous every 8 hours  insulin lispro (HumaLOG) corrective regimen sliding scale.   SubCutaneous every 6 hours  midodrine 10 milliGRAM(s) Oral every 8 hours  multivitamin/minerals/iron Oral Solution (CENTRUM) 15 milliLiter(s) Oral daily  norepinephrine Infusion 0.05 MICROgram(s)/kG/Min (11.1 mL/Hr) IV Continuous <Continuous>  pantoprazole  Injectable 40 milliGRAM(s) IV Push daily  propofol Infusion 10 MICROgram(s)/kG/Min (7.07 mL/Hr) IV Continuous <Continuous>  senna 2 Tablet(s) Oral at bedtime  thiamine 100 milliGRAM(s) Oral daily  tiotropium 18 MICROgram(s) Capsule 1 Capsule(s) Inhalation daily  vancomycin  IVPB 1250 milliGRAM(s) IV Intermittent every 12 hours    MEDICATIONS  (PRN):  acetaminophen   Tablet .. 650 milliGRAM(s) Oral every 6 hours PRN Temp greater or equal to 38.5C (101.3F)  LORazepam   Injectable 1 milliGRAM(s) IV Push every 6 hours PRN Agitation or Anxiety  polyethylene glycol 3350 17 Gram(s) Oral daily PRN Constipation      Allergies    Ceclor (Rash)  Duricef (Rash)    Intolerances        Vital Signs Last 24 Hrs  T(C): 38.3 (06 May 2020 11:00), Max: 38.3 (06 May 2020 11:00)  T(F): 101 (06 May 2020 11:00), Max: 101 (06 May 2020 11:00)  HR: 115 (06 May 2020 13:00) (40 - 127)  BP: 147/76 (06 May 2020 13:00) (103/53 - 150/74)  BP(mean): 90 (06 May 2020 13:00) (58 - 90)  RR: 23 (06 May 2020 13:00) (14 - 23)  SpO2: 98% (06 May 2020 13:00) (94% - 99%)  Mode: AC/ CMV (Assist Control/ Continuous Mandatory Ventilation)  RR (machine): 12  TV (machine): 550  FiO2: 30  PEEP: 5  ITime: 1  MAP: 18  PIP: 30      PHYSICAL EXAMINATION:    NECK:  Supple. No lymphadenopathy. Jugular venous pressure not elevated. Carotids equal.   HEART:   The cardiac impulse has a normal quality. Reg., Nl S1 and S2.  There are no murmurs, rubs or gallops noted  CHEST:  Chest rhonchi to auscultation. Normal respiratory effort.  ABDOMEN:  Soft and nontender.   EXTREMITIES:  There is no edema.       LABS:                        13.1   8.45  )-----------( 119      ( 05 May 2020 06:53 )             39.4     05-05    137  |  98  |  28<H>  ----------------------------<  232<H>  3.8   |  33<H>  |  0.63    Ca    8.0<L>      05 May 2020 06:53  Phos  2.7     05-05  Mg     2.0     05-05

## 2020-05-06 NOTE — PROGRESS NOTE ADULT - PROBLEM SELECTOR PLAN 3
Controlled.
Fair control.
Improved and will need to continue on current diuretic dose.
Probable mild exacerbation of diastolic HF (chronic); change furosemide to once-daily dosing.
Probable mild exacerbation of diastolic HF (chronic); change furosemide to once-daily dosing.
Satisfactory control.
improved ventricular rate,  continue cardizem , plavix ,  (hx of Watchman device placement due to multiple falls )
Hypotensive this PM hold lasix IV.

## 2020-05-07 LAB
ANION GAP SERPL CALC-SCNC: 6 MMOL/L — SIGNIFICANT CHANGE UP (ref 5–17)
BASE EXCESS BLDA CALC-SCNC: 4.6 MMOL/L — HIGH (ref -2–2)
BLOOD GAS COMMENTS ARTERIAL: SIGNIFICANT CHANGE UP
BUN SERPL-MCNC: 33 MG/DL — HIGH (ref 7–23)
CALCIUM SERPL-MCNC: 8.5 MG/DL — SIGNIFICANT CHANGE UP (ref 8.5–10.1)
CHLORIDE SERPL-SCNC: 100 MMOL/L — SIGNIFICANT CHANGE UP (ref 96–108)
CO2 SERPL-SCNC: 31 MMOL/L — SIGNIFICANT CHANGE UP (ref 22–31)
CREAT SERPL-MCNC: 0.83 MG/DL — SIGNIFICANT CHANGE UP (ref 0.5–1.3)
CULTURE RESULTS: SIGNIFICANT CHANGE UP
GAS PNL BLDA: SIGNIFICANT CHANGE UP
GLUCOSE SERPL-MCNC: 114 MG/DL — HIGH (ref 70–99)
HCO3 BLDA-SCNC: 29 MMOL/L — SIGNIFICANT CHANGE UP (ref 21–29)
HCT VFR BLD CALC: 37.6 % — LOW (ref 39–50)
HGB BLD-MCNC: 12.5 G/DL — LOW (ref 13–17)
MAGNESIUM SERPL-MCNC: 2.4 MG/DL — SIGNIFICANT CHANGE UP (ref 1.6–2.6)
MCHC RBC-ENTMCNC: 32 PG — SIGNIFICANT CHANGE UP (ref 27–34)
MCHC RBC-ENTMCNC: 33.2 GM/DL — SIGNIFICANT CHANGE UP (ref 32–36)
MCV RBC AUTO: 96.2 FL — SIGNIFICANT CHANGE UP (ref 80–100)
PCO2 BLDA: 43 MMHG — SIGNIFICANT CHANGE UP (ref 32–46)
PH BLDA: 7.44 — SIGNIFICANT CHANGE UP (ref 7.35–7.45)
PHOSPHATE SERPL-MCNC: 4.2 MG/DL — SIGNIFICANT CHANGE UP (ref 2.5–4.5)
PLATELET # BLD AUTO: 197 K/UL — SIGNIFICANT CHANGE UP (ref 150–400)
PO2 BLDA: 118 MMHG — HIGH (ref 74–108)
POTASSIUM SERPL-MCNC: 4.9 MMOL/L — SIGNIFICANT CHANGE UP (ref 3.5–5.3)
POTASSIUM SERPL-SCNC: 4.9 MMOL/L — SIGNIFICANT CHANGE UP (ref 3.5–5.3)
RBC # BLD: 3.91 M/UL — LOW (ref 4.2–5.8)
RBC # FLD: 12.6 % — SIGNIFICANT CHANGE UP (ref 10.3–14.5)
SAO2 % BLDA: 98 % — HIGH (ref 92–96)
SODIUM SERPL-SCNC: 137 MMOL/L — SIGNIFICANT CHANGE UP (ref 135–145)
SPECIMEN SOURCE: SIGNIFICANT CHANGE UP
WBC # BLD: 8.31 K/UL — SIGNIFICANT CHANGE UP (ref 3.8–10.5)
WBC # FLD AUTO: 8.31 K/UL — SIGNIFICANT CHANGE UP (ref 3.8–10.5)

## 2020-05-07 PROCEDURE — 74018 RADEX ABDOMEN 1 VIEW: CPT | Mod: 26

## 2020-05-07 PROCEDURE — 71045 X-RAY EXAM CHEST 1 VIEW: CPT | Mod: 26,76

## 2020-05-07 PROCEDURE — 99291 CRITICAL CARE FIRST HOUR: CPT

## 2020-05-07 RX ORDER — MIDAZOLAM HYDROCHLORIDE 1 MG/ML
0.02 INJECTION, SOLUTION INTRAMUSCULAR; INTRAVENOUS
Qty: 100 | Refills: 0 | Status: DISCONTINUED | OUTPATIENT
Start: 2020-05-07 | End: 2020-05-08

## 2020-05-07 RX ADMIN — GABAPENTIN 400 MILLIGRAM(S): 400 CAPSULE ORAL at 12:51

## 2020-05-07 RX ADMIN — HEPARIN SODIUM 5000 UNIT(S): 5000 INJECTION INTRAVENOUS; SUBCUTANEOUS at 12:51

## 2020-05-07 RX ADMIN — MIDODRINE HYDROCHLORIDE 10 MILLIGRAM(S): 2.5 TABLET ORAL at 05:10

## 2020-05-07 RX ADMIN — PROPOFOL 7.07 MICROGRAM(S)/KG/MIN: 10 INJECTION, EMULSION INTRAVENOUS at 05:26

## 2020-05-07 RX ADMIN — Medication 81 MILLIGRAM(S): at 12:50

## 2020-05-07 RX ADMIN — PROPOFOL 7.07 MICROGRAM(S)/KG/MIN: 10 INJECTION, EMULSION INTRAVENOUS at 17:38

## 2020-05-07 RX ADMIN — MIDAZOLAM HYDROCHLORIDE 2.36 MG/KG/HR: 1 INJECTION, SOLUTION INTRAMUSCULAR; INTRAVENOUS at 22:15

## 2020-05-07 RX ADMIN — PROPOFOL 7.07 MICROGRAM(S)/KG/MIN: 10 INJECTION, EMULSION INTRAVENOUS at 12:51

## 2020-05-07 RX ADMIN — Medication 166.67 MILLIGRAM(S): at 17:37

## 2020-05-07 RX ADMIN — MIDODRINE HYDROCHLORIDE 10 MILLIGRAM(S): 2.5 TABLET ORAL at 12:50

## 2020-05-07 RX ADMIN — CHLORHEXIDINE GLUCONATE 15 MILLILITER(S): 213 SOLUTION TOPICAL at 17:37

## 2020-05-07 RX ADMIN — CLOPIDOGREL BISULFATE 75 MILLIGRAM(S): 75 TABLET, FILM COATED ORAL at 12:50

## 2020-05-07 RX ADMIN — HEPARIN SODIUM 5000 UNIT(S): 5000 INJECTION INTRAVENOUS; SUBCUTANEOUS at 05:09

## 2020-05-07 RX ADMIN — Medication 650 MILLIGRAM(S): at 16:54

## 2020-05-07 RX ADMIN — HEPARIN SODIUM 5000 UNIT(S): 5000 INJECTION INTRAVENOUS; SUBCUTANEOUS at 21:35

## 2020-05-07 RX ADMIN — GABAPENTIN 400 MILLIGRAM(S): 400 CAPSULE ORAL at 21:35

## 2020-05-07 RX ADMIN — Medication 100 MILLIGRAM(S): at 12:50

## 2020-05-07 RX ADMIN — Medication 166.67 MILLIGRAM(S): at 05:00

## 2020-05-07 RX ADMIN — Medication 1 MILLIGRAM(S): at 12:50

## 2020-05-07 RX ADMIN — Medication 650 MILLIGRAM(S): at 04:59

## 2020-05-07 RX ADMIN — CHLORHEXIDINE GLUCONATE 15 MILLILITER(S): 213 SOLUTION TOPICAL at 04:56

## 2020-05-07 RX ADMIN — GABAPENTIN 400 MILLIGRAM(S): 400 CAPSULE ORAL at 05:09

## 2020-05-07 RX ADMIN — Medication 15 MILLILITER(S): at 12:50

## 2020-05-07 RX ADMIN — MIDODRINE HYDROCHLORIDE 10 MILLIGRAM(S): 2.5 TABLET ORAL at 21:35

## 2020-05-07 RX ADMIN — PANTOPRAZOLE SODIUM 40 MILLIGRAM(S): 20 TABLET, DELAYED RELEASE ORAL at 12:50

## 2020-05-07 RX ADMIN — DEXMEDETOMIDINE HYDROCHLORIDE IN 0.9% SODIUM CHLORIDE 29.5 MICROGRAM(S)/KG/HR: 4 INJECTION INTRAVENOUS at 05:26

## 2020-05-07 NOTE — CHART NOTE - NSCHARTNOTEFT_GEN_A_CORE
HPI:  65 male with acute respiratory failure from altered awareness and sedation, COPD, AFIB s/p watchman, CHF, HTN, ETOH abuse.    Current status: Failed weaning on 5/6. To discuss with patient's daughter regarding Trach placement. TF on hold secondary to patient biting through OGtube. Discussed CORPAK placement during rounds today to continue with enteral nutrition.     Current weight 5/7-262.7# Weight 4/.9#- 8# weight loss x 2 weeks (3%). Pt continues with moderate/severe edema (+2/+3). Skin intact. Jitendra score-11(high risk for skin breakdown).  Off pressors. Sedation: Propofol infusing at 21.2ml/24h providing additional 560 calories which is included in daily enteral feeds.   I & O's- urine incontinent x 1 on 5/7 and catheter- 1200ml. Last BM on 5/6 x 1 day ago. Free water as per flowsheet: 300ml.     Pt remains on Folic acid, thiamine, Ativan 2/2 history of ETOH.     Will continue to follow monitor tolerance of enteral feeds s/p placement of CORPAK.     Recommendations:  1) Restart enteral feeds via CORPAK. Jevity 1.5 45cc/hr (total volume 1000ml) + 6 packs of prosource TF daily. (total provided including calories from propofol- 2150 calories/ 124gm protein/ 684ml free water from enteral formula)  2) Additional continuous water flush 60cc/hr (total volume 1200ml daily). Total daily water volume including free water from enteral formula= 1884ml daily.   3) Maintain aspiration precautions, back of bed >35 degrees.   4) Monitor residuals if >250ml consider initiating PPI and or increasing dosage.   5) monitor daily weights  6) monitor triglycerides weekly due to propofol  7) monitor labs/lytes and replete as needed  8) consider PEG placement for long term nutrition support when medically feasible        Diet Presciption: Diet, NPO with Tube Feed:   Tube Feeding Modality: Orogastric  Jevity 1.5 Randall (JEVITY1.5)  Total Volume for 24 Hours (mL): 1200  Continuous  Starting Tube Feed Rate {mL per Hour}: 50  Increase Tube Feed Rate by (mL): 0     Every hour  Until Goal Tube Feed Rate (mL per Hour): 50  Tube Feed Duration (in Hours): 24  Tube Feed Start Time: 00:00  No Carb Prosource TF     Qty per Day:  6 (04-30-20 @ 19:04)      Wt Hx: 5/7- 262.7#  Height (cm): 182.88 (04-18-20 @ 19:32)  Weight (kg): 117.9 (04-18-20 @ 19:32)  BMI (kg/m2): 35.3 (04-18-20 @ 19:32)        Weight Used for Calculation: Adjusted body weight 87.5Kg IBW used for protein needs 77.5Kg     Estimated Energy Needs (20-25 calories/kg): 6600-1158 calories  Estimated Protein Needs ( 1.6-1.8 gm/kg): 124-140gm protein  Estimated Fluid Needs (<25 ml/kg): 1500ml daily      Labs: 05-07 Na137 mmol/L Glu 114 mg/dL<H> K+ 4.9 mmol/L Cr  0.83 mg/dL BUN 33 mg/dL<H> 05-07 Phos 4.2 mg/dL  POCT Blood Glucose.: 104 mg/dL (07 May 2020 05:14)  POCT Blood Glucose.: 124 mg/dL (07 May 2020 01:00)  POCT Blood Glucose.: 82 mg/dL (06 May 2020 17:07)  POCT Blood Glucose.: 119 mg/dL (06 May 2020 11:19)      Skin: jitendra score = 11 (high risk for skin breakdown)  Edema-+2 +3   Skin intact

## 2020-05-07 NOTE — PROGRESS NOTE ADULT - SUBJECTIVE AND OBJECTIVE BOX
Date of service: 05-07-20 @ 11:38    Intubated on ventilatory support  Febrile to 101.1F  Lethargic    ROS: unobtainable    MEDICATIONS  (STANDING):  ALBUTerol    90 MICROgram(s) HFA Inhaler 2 Puff(s) Inhalation every 4 hours  aspirin  chewable 81 milliGRAM(s) Oral daily  budesonide 160 MICROgram(s)/formoterol 4.5 MICROgram(s) Inhaler 2 Puff(s) Inhalation two times a day  chlorhexidine 0.12% Liquid 15 milliLiter(s) Oral Mucosa every 12 hours  clopidogrel Tablet 75 milliGRAM(s) Oral daily  dexMEDEtomidine Infusion 1 MICROgram(s)/kG/Hr (29.5 mL/Hr) IV Continuous <Continuous>  folic acid 1 milliGRAM(s) Oral daily  gabapentin 400 milliGRAM(s) Oral three times a day  heparin   Injectable 5000 Unit(s) SubCutaneous every 8 hours  insulin lispro (HumaLOG) corrective regimen sliding scale.   SubCutaneous every 6 hours  midodrine 10 milliGRAM(s) Oral every 8 hours  multivitamin/minerals/iron Oral Solution (CENTRUM) 15 milliLiter(s) Oral daily  norepinephrine Infusion 0.05 MICROgram(s)/kG/Min (11.1 mL/Hr) IV Continuous <Continuous>  pantoprazole  Injectable 40 milliGRAM(s) IV Push daily  phenylephrine    Infusion 0.1 MICROgram(s)/kG/Min (4.42 mL/Hr) IV Continuous <Continuous>  propofol Infusion 10 MICROgram(s)/kG/Min (7.07 mL/Hr) IV Continuous <Continuous>  senna 2 Tablet(s) Oral at bedtime  thiamine 100 milliGRAM(s) Oral daily  tiotropium 18 MICROgram(s) Capsule 1 Capsule(s) Inhalation daily  vancomycin  IVPB 1250 milliGRAM(s) IV Intermittent every 12 hours      Vital Signs Last 24 Hrs  T(C): 37.9 (07 May 2020 08:00), Max: 38.4 (07 May 2020 04:00)  T(F): 100.2 (07 May 2020 08:00), Max: 101.1 (07 May 2020 04:00)  HR: 83 (07 May 2020 11:00) (54 - 127)  BP: 100/68 (07 May 2020 11:00) (80/53 - 147/76)  BP(mean): 76 (07 May 2020 11:00) (53 - 90)  RR: 18 (07 May 2020 11:00) (14 - 36)  SpO2: 96% (07 May 2020 11:00) (86% - 100%)    Mode: AC/ CMV (Assist Control/ Continuous Mandatory Ventilation), RR (machine): 12, TV (machine): 550, FiO2: 60, PEEP: 8, ITime: 1, MAP: 15, PIP: 33    Physical exam:    Constitutional:  No acute distress  HEENT: NC/AT, EOMI, PERRLA, conjunctivae clear  Neck: supple; thyroid not palpable  Back: no tenderness  Respiratory: respiratory effort normal; b/l rhonchi  Cardiovascular: S1S2 regular, no murmurs  Abdomen: soft, not tender, not distended, positive BS  Genitourinary: no suprapubic tenderness  Lymphatic: no LN palpable  Musculoskeletal: no muscle tenderness, no joint swelling or tenderness  Extremities: no pedal edema  Neurological/ Psychiatric: confused; moving all extremities  Skin: no rashes; no palpable lesions    Labs: reviewed                        12.5   8.31  )-----------( 197      ( 07 May 2020 08:15 )             37.6     05-07    137  |  100  |  33<H>  ----------------------------<  114<H>  4.9   |  31  |  0.83    Ca    8.5      07 May 2020 08:15  Phos  4.2     05-07  Mg     2.4     05-07    Vancomycin Level, Trough: 10.1 ug/mL (05-02 @ 16:59)                        15.4   15.99 )-----------( 73       ( 03 May 2020 08:48 )             45.9     05-02    143  |  104  |  45<H>  ----------------------------<  242<H>  3.6   |  34<H>  |  0.75    Ca    8.1<L>      02 May 2020 06:13  Phos  2.3     05-02  Mg     2.4     05-02                 17.7   14.05 )-----------( 133      ( 28 Apr 2020 06:48 )             54.5     04-28    154<H>  |  114<H>  |  50<H>  ----------------------------<  172<H>  3.8   |  36<H>  |  1.07    Ca    8.6      28 Apr 2020 06:48  Phos  3.2     04-28  Mg     2.8     04-28    COVID-19 PCR . (04.26.20 @ 00:02)    COVID-19 PCR: NotDetec      Culture - Blood (collected 26 Apr 2020 01:01)  Source: .Blood None  Preliminary Report (27 Apr 2020 10:02):    No growth to date.    Culture - Blood (collected 26 Apr 2020 00:56)  Source: .Blood None  Preliminary Report (27 Apr 2020 10:02):    No growth to date.    Culture - Sputum (collected 25 Apr 2020 01:00)  Source: .Sputum Sputum  trap  Gram Stain (26 Apr 2020 12:29):    Moderate polymorphonuclear leukocytes per low power field    No Squamous epithelial cells per low power field    Rare Gram Positive Rods per oil power field    Rare Gram Positive Cocci in Pairs and Chains per oil power field  Final Report (28 Apr 2020 10:53):    Few Pseudomonas aeruginosa (Carbapenem Resistant)    Moderate Methicillin resistant Staphylococcus aureus    Normal Respiratory Faith present  Organism: Pseudomonas aeruginosa (Carbapenem Resistant)  Methicillin resistant Staphylococcus aureus (28 Apr 2020 10:44)  Organism: Pseudomonas aeruginosa (Carbapenem Resistant) (28 Apr 2020 10:44)      -  Amikacin: S <=16      -  Aztreonam: R >16      -  Cefepime: R >16      -  Ceftazidime: R >16      -  Ciprofloxacin: S <=0.25      -  Gentamicin: S 4      -  Imipenem: R >8      -  Levofloxacin: S <=0.5      -  Meropenem: R >8      -  Piperacillin/Tazobactam: R >64      -  Tobramycin: S <=2      Method Type: LADY  Organism: Methicillin resistant Staphylococcus aureus (28 Apr 2020 10:40)      -  Amoxicillin/Clavulanic Acid: R >4/2      -  Ampicillin: R >8      -  Ampicillin/Sulbactam: R <=8/4      -  Cefazolin: R >16      -  Ceftriaxone: R >32      -  Ciprofloxacin: R >2      -  Clindamycin: R >4      -  Daptomycin: S 0.5      -  Erythromycin: R >4      -  Gentamicin: S <=1 Should not be used as monotherapy      -  Levofloxacin: R >4      -  Linezolid: S 4      -  Meropenem: R >8      -  Moxifloxacin(Aerobic): R >4      -  Oxacillin: R >2      -  Penicillin: R >8      -  RIF- Rifampin: S <=1 Should not be used as monotherapy      -  Tetra/Doxy: S 2      -  Trimethoprim/Sulfamethoxazole: S <=0.5/9.5      -  Vancomycin: S 2      Method Type: LADY    Culture - Urine (collected 21 Apr 2020 15:51)  Source: .Urine Clean Catch (Midstream)  Final Report (22 Apr 2020 17:03):    <10,000 CFU/mL Normal Urogenital Faith    Culture - Blood (collected 18 Apr 2020 19:43)  Source: .Blood Blood-Peripheral  Final Report (24 Apr 2020 01:01):    No Growth Final    Culture - Sputum . (04.29.20 @ 15:00)    -  Trimethoprim/Sulfamethoxazole: S <=0.5/9.5    -  Vancomycin: S 2    -  Tetra/Doxy: S 2    -  Gentamicin: S <=1 Should not be used as monotherapy    -  Erythromycin: R >4    -  Clindamycin: R >4    -  Cefazolin: R >16    -  Linezolid: S 4    -  Oxacillin: R >2    -  Penicillin: R >8    -  RIF- Rifampin: S <=1 Should not be used as monotherapy    -  Ampicillin/Sulbactam: R <=8/4    Gram Stain:   Numerous polymorphonuclear leukocytes per low power field  No Squamous epithelial cells per low power field  Numerous Gram Positive Cocci in Clusters per oil power field  Few Gram Positive Rods per oil power field  Few Gram Negative Rods per oil power field    Specimen Source: .Sputum Sputum    Culture Results:   Numerous Methicillin resistant Staphylococcus aureus  Normal Respiratory Faith present    Organism Identification: Methicillin resistant Staphylococcus aureus    Organism: Methicillin resistant Staphylococcus aureus    Method Type: LADY        Radiology: all available radiological tests reviewed    < from: Xray Chest 1 View AP/PA. (04.25.20 @ 10:12) >  Percent of LEFT lung opacification: Clear  Percent of RIGHT lung opacification: Clear  Change in lung opacification from most recent x-ray (<=3 days): Stable  Change from prior dated 3 or more days (same admission): No Prior    < end of copied text >    < from: Xray Chest 1 View-PORTABLE IMMEDIATE (04.29.20 @ 07:36) >  Percent of LEFT lung opacification: %  Percent of RIGHT lung opacification: Clear  Change in lung opacification from most recent x-ray (<=3 days): Increase  Change from prior dated 3or more days (same admission): Increase    < end of copied text >      Advanced directives addressed: full resuscitation

## 2020-05-07 NOTE — PROGRESS NOTE ADULT - ASSESSMENT
66 y/o male with h/o chronic A.Fib with Watchman device, CHF, COPD, HTN, obesity was admitted on 4/18 for worsening SOB. In ER he was found with rapid A.fib and was placed on NRB. He tested COVID-19 PCR negative x 3. Noted with hypercapnea and placed on BiPAP, then intubated and placed on ventilatory support. He was extubated on 4/26. He is reported with MDRO's in sputum c/s. He was treated with azithromycin from 4/20 to 4/25.     1. Acute respiratory failure. Febrile syndrome improving. 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 Allergy to PCN and cephalosporines.  -still febrile  -unable to wean yet  -leukocytosis is resolved  -encephalopathy   -cultures reviewed; repeat sputum c/s shows MRSA and normal respiratory raymundo  -s/p cipro IV # 9  -on vancomycin 1 gm IV q12h # 10  -tolerating abx well so far; no side effects noted  -respiratory care  -continue IV abx coverage for now  -weaning trial  -monitor temps  -f/u CBC  -supportive care  2. Other issues:   -care per medicine

## 2020-05-07 NOTE — PROGRESS NOTE ADULT - ASSESSMENT
PROBLEMS;    Ac on chronic hypercapnic & hypoxamic respiratory failure-s/p extubation-worsening ventilation  sputum-Few Pseudomonas aeruginosa (Carbapenem Resistant)/Moderate Methicillin resistant Staphylococcus aureus  afib with RVR  OHS/VIJAY  AC flare of COPD/chronic vs acute on chronic bronchitis  Mild interstitial lung disease-NSIP h/o smoking  COVID negativex2  Pulmonary hypertension  Nonobstructing stone in the left ureterovesicular junction/ nonobstructing stone in the lower pole right kidney.  Subacute hemorrhage in the right rectus abdominis along the anterior sheath, measures 1.6 cm in thickness and extends from the umbilicus to the inferior myotendinous junction  Cirrhosis  Mild splenomegaly-portal venous hypertension.  Chronic afib w Watchman device  CHF  BPH  GERD  ETOH abuse  seizures disorder    PLAN;    vent support-pat multiple failed weanin- need a trach  iv percedex/prophyl  iv levo/angelic for Bp   iv vanco  aerosols  supportive care  covid repeat testing-neg  d/w staff

## 2020-05-07 NOTE — PROGRESS NOTE ADULT - SUBJECTIVE AND OBJECTIVE BOX
HPI: Patient seen at a  this morning for AMS and hypoxic  Patient had been admitted with a COPD exacerbation.  It was believed that he went into ETOH withdrawal and had a total of 6MG Ativan over night.  He required intubation 4/23.    4/24: he remains intubated.  Will try to wean this afternoon.      4/25: He weaned this morning and failed with a decreased Tv.  But close to extubation.    4/26: Patient weaned well this morning and was extubated.      5/2: Chart reviewed, Patient was reintubated, failed weaning this morning, CXR with improving infiltrates  5/3: Patient remains on the vent failed weaning x3 today with increased RR and low TV, Off Levophed now  5/4: Failed weaning yesterday, had to change his ET tube yesterday because he chewed through it,     5/5: Pt on Precedex and Diprivan for sedation, Patient failed weaning again today  5/6: Patient failed weaning today with a low TV and High RR  5/7: He again failed weaning, NGT replaced, Fi O2 back to 40%, WBC normal,         PAST MEDICAL HX  Alcohol abuse    Alcohol withdrawal    Anemia    AFIB atrial fibrillation    CHF (congestive heart failure)    Chronic atrial fibrillation    Chronic CHF    Chronic obstructive pulmonary disease (COPD)    Cirrhosis    Collapsed lung    COPD without exacerbation    Emphysema of lung    Falls    GERD (gastroesophageal reflux disease)    HTN hypertension    Meningitis    Poor historian    Presence of Watchman left atrial appendage closure device.    PAST SURGICAL HX  Presence of Watchman left atrial appendage closure device    S/P BKA (below knee amputation) unilateral, left    Unilateral amputation of lower extremity below knee.      FAMILY HX  Family history unknown: Adopted      SOCIAL HX  lives alone  former smoker  alcohol use last 2-3 days ago (19 Apr 2020 03:19)       PAST MEDICAL & SURGICAL HISTORY:  Chronic CHF  COPD without exacerbation  Atrial fibrillation  Presence of Watchman left atrial appendage closure device  Hypertension  GERD (gastroesophageal reflux disease)  Chronic obstructive pulmonary disease (COPD)  Anemia  Falls  Meningitis  Collapsed lung  Alcohol withdrawal  Emphysema of lung  Cirrhosis  CHF (congestive heart failure)  Poor historian  Alcohol abuse  Chronic atrial fibrillation  Unilateral amputation of lower extremity below knee  Presence of Watchman left atrial appendage closure device  S/P BKA (below knee amputation) unilateral, left      FAMILY HISTORY:  No pertinent family history in first degree relatives  Family history unknown: Adopted      Social Hx:    Allergies    Ceclor (Rash)  Duricef (Rash)    Intolerances            ICU Vital Signs Last 24 Hrs  T(C): 37.7 (07 May 2020 12:00), Max: 38.4 (07 May 2020 04:00)  T(F): 99.9 (07 May 2020 12:00), Max: 101.1 (07 May 2020 04:00)  HR: 69 (07 May 2020 12:00) (54 - 83)  BP: 100/70 (07 May 2020 12:00) (80/53 - 119/64)  BP(mean): 76 (07 May 2020 12:00) (53 - 82)  ABP: --  ABP(mean): --  RR: 17 (07 May 2020 12:00) (14 - 36)  SpO2: 100% (07 May 2020 12:00) (86% - 100%)      Mode: AC/ CMV (Assist Control/ Continuous Mandatory Ventilation)  RR (machine): 12  TV (machine): 550  FiO2: 60  PEEP: 8  ITime: 1  MAP: 15  PIP: 33      I&O's Summary    06 May 2020 07:01  -  07 May 2020 07:00  --------------------------------------------------------  IN: 2497.1 mL / OUT: 1200 mL / NET: 1297.1 mL    07 May 2020 07:01  -  07 May 2020 13:18  --------------------------------------------------------  IN: 442.5 mL / OUT: 300 mL / NET: 142.5 mL                              12.5   8.31  )-----------( 197      ( 07 May 2020 08:15 )             37.6       05-07    137  |  100  |  33<H>  ----------------------------<  114<H>  4.9   |  31  |  0.83    Ca    8.5      07 May 2020 08:15  Phos  4.2     05-07  Mg     2.4     05-07              ABG - ( 07 May 2020 05:17 )  pH, Arterial: 7.44  pH, Blood: x     /  pCO2: 43    /  pO2: 118   / HCO3: 29    / Base Excess: 4.6   /  SaO2: 98                      MEDICATIONS  (STANDING):  ALBUTerol    90 MICROgram(s) HFA Inhaler 2 Puff(s) Inhalation every 4 hours  aspirin  chewable 81 milliGRAM(s) Oral daily  budesonide 160 MICROgram(s)/formoterol 4.5 MICROgram(s) Inhaler 2 Puff(s) Inhalation two times a day  chlorhexidine 0.12% Liquid 15 milliLiter(s) Oral Mucosa every 12 hours  clopidogrel Tablet 75 milliGRAM(s) Oral daily  dexMEDEtomidine Infusion 1 MICROgram(s)/kG/Hr (29.5 mL/Hr) IV Continuous <Continuous>  folic acid 1 milliGRAM(s) Oral daily  gabapentin 400 milliGRAM(s) Oral three times a day  heparin   Injectable 5000 Unit(s) SubCutaneous every 8 hours  insulin lispro (HumaLOG) corrective regimen sliding scale.   SubCutaneous every 6 hours  midodrine 10 milliGRAM(s) Oral every 8 hours  multivitamin/minerals/iron Oral Solution (CENTRUM) 15 milliLiter(s) Oral daily  norepinephrine Infusion 0.05 MICROgram(s)/kG/Min (11.1 mL/Hr) IV Continuous <Continuous>  pantoprazole  Injectable 40 milliGRAM(s) IV Push daily  phenylephrine    Infusion 0.1 MICROgram(s)/kG/Min (4.42 mL/Hr) IV Continuous <Continuous>  propofol Infusion 10 MICROgram(s)/kG/Min (7.07 mL/Hr) IV Continuous <Continuous>  senna 2 Tablet(s) Oral at bedtime  thiamine 100 milliGRAM(s) Oral daily  tiotropium 18 MICROgram(s) Capsule 1 Capsule(s) Inhalation daily  vancomycin  IVPB 1250 milliGRAM(s) IV Intermittent every 12 hours    MEDICATIONS  (PRN):  acetaminophen   Tablet .. 650 milliGRAM(s) Oral every 6 hours PRN Temp greater or equal to 38.5C (101.3F)  LORazepam   Injectable 1 milliGRAM(s) IV Push every 6 hours PRN Agitation or Anxiety  polyethylene glycol 3350 17 Gram(s) Oral daily PRN Constipation      DVT Prophylaxis:    Advanced Directives:  Discussed with:    Visit Information: 30 min    ** Time is exclusive of billed procedures and/or teaching and/or routine family updates.

## 2020-05-07 NOTE — PROGRESS NOTE ADULT - SUBJECTIVE AND OBJECTIVE BOX
Subjective:    pat failed weaning, yesterday required 100% now down to 40% again.    Home Medications:  albuterol 2.5 mg/3 mL (0.083%) inhalation solution: 3 milliliter(s) inhaled every 6 hours, As Needed -for shortness of breath and/or wheezing (19 Apr 2020 03:12)  gabapentin 400 mg oral capsule: 1 cap(s) orally 3 times a day (19 Apr 2020 03:12)  pantoprazole 40 mg oral granule, enteric coated: 40 milligram(s) orally once a day (19 Apr 2020 03:12)  Spiriva 18 mcg inhalation capsule: 1 cap(s) inhaled once a day (19 Apr 2020 03:12)  tamsulosin 0.4 mg oral capsule: 1 cap(s) orally once a day (at bedtime) (19 Apr 2020 03:12)  traZODone 50 mg oral tablet: 2 tab(s) orally once a day (at bedtime), As Needed (19 Apr 2020 03:12)    MEDICATIONS  (STANDING):  ALBUTerol    90 MICROgram(s) HFA Inhaler 2 Puff(s) Inhalation every 4 hours  aspirin  chewable 81 milliGRAM(s) Oral daily  budesonide 160 MICROgram(s)/formoterol 4.5 MICROgram(s) Inhaler 2 Puff(s) Inhalation two times a day  chlorhexidine 0.12% Liquid 15 milliLiter(s) Oral Mucosa every 12 hours  clopidogrel Tablet 75 milliGRAM(s) Oral daily  dexMEDEtomidine Infusion 1 MICROgram(s)/kG/Hr (29.5 mL/Hr) IV Continuous <Continuous>  folic acid 1 milliGRAM(s) Oral daily  gabapentin 400 milliGRAM(s) Oral three times a day  heparin   Injectable 5000 Unit(s) SubCutaneous every 8 hours  insulin lispro (HumaLOG) corrective regimen sliding scale.   SubCutaneous every 6 hours  midodrine 10 milliGRAM(s) Oral every 8 hours  multivitamin/minerals/iron Oral Solution (CENTRUM) 15 milliLiter(s) Oral daily  norepinephrine Infusion 0.05 MICROgram(s)/kG/Min (11.1 mL/Hr) IV Continuous <Continuous>  pantoprazole  Injectable 40 milliGRAM(s) IV Push daily  phenylephrine    Infusion 0.1 MICROgram(s)/kG/Min (4.42 mL/Hr) IV Continuous <Continuous>  propofol Infusion 10 MICROgram(s)/kG/Min (7.07 mL/Hr) IV Continuous <Continuous>  senna 2 Tablet(s) Oral at bedtime  thiamine 100 milliGRAM(s) Oral daily  tiotropium 18 MICROgram(s) Capsule 1 Capsule(s) Inhalation daily  vancomycin  IVPB 1250 milliGRAM(s) IV Intermittent every 12 hours    MEDICATIONS  (PRN):  acetaminophen   Tablet .. 650 milliGRAM(s) Oral every 6 hours PRN Temp greater or equal to 38.5C (101.3F)  LORazepam   Injectable 1 milliGRAM(s) IV Push every 6 hours PRN Agitation or Anxiety  polyethylene glycol 3350 17 Gram(s) Oral daily PRN Constipation      Allergies    Ceclor (Rash)  Duricef (Rash)    Intolerances        Vital Signs Last 24 Hrs  T(C): 37.7 (07 May 2020 12:00), Max: 38.4 (07 May 2020 04:00)  T(F): 99.9 (07 May 2020 12:00), Max: 101.1 (07 May 2020 04:00)  HR: 75 (07 May 2020 12:00) (54 - 83)  BP: 100/70 (07 May 2020 12:00) (80/53 - 119/64)  BP(mean): 76 (07 May 2020 12:00) (53 - 82)  RR: 17 (07 May 2020 12:00) (14 - 36)  SpO2: 100% (07 May 2020 12:00) (86% - 100%)  Mode: AC/ CMV (Assist Control/ Continuous Mandatory Ventilation)  RR (machine): 12  TV (machine): 550  FiO2: 100  PEEP: 9  ITime: 1  MAP: 14  PIP: 31      PHYSICAL EXAMINATION:    NECK:  Supple. No lymphadenopathy. Jugular venous pressure not elevated. Carotids equal.   HEART:   The cardiac impulse has a normal quality. Reg., Nl S1 and S2.  There are no murmurs, rubs or gallops noted  CHEST:  Chest poor air entry to auscultation. Normal respiratory effort.  ABDOMEN:  Soft and nontender.   EXTREMITIES:  There is no edema.       LABS:                        12.5   8.31  )-----------( 197      ( 07 May 2020 08:15 )             37.6     05-07    137  |  100  |  33<H>  ----------------------------<  114<H>  4.9   |  31  |  0.83    Ca    8.5      07 May 2020 08:15  Phos  4.2     05-07  Mg     2.4     05-07

## 2020-05-07 NOTE — PROGRESS NOTE ADULT - ASSESSMENT
A/P: 65 male with acute respiratory failure from altered awareness and sedation  COPD  AFIB s/p watchman  CHF  HTN  ETOH abuse    Plan:   PULM: Failed Wean today low TV and RR in the 40s--  D/W the patient daughter about a Trach again  Cardio: Continue ASA, Plavix  Renal: Cr. Stable  GI: Feeds, PPI  ID: Vanco MRSA PNA, blood cultures negative   PSY: Ativan 1mg  ATC PRN, Thiamine for chronic ETOH abuse and thiamine deficiency folate    Lovenox DVT Prophylaxis    D/W the patients daughter

## 2020-05-08 LAB
ALBUMIN SERPL ELPH-MCNC: 1.6 G/DL — LOW (ref 3.3–5)
ALP SERPL-CCNC: 132 U/L — HIGH (ref 40–120)
ALT FLD-CCNC: 40 U/L — SIGNIFICANT CHANGE UP (ref 12–78)
ANION GAP SERPL CALC-SCNC: 5 MMOL/L — SIGNIFICANT CHANGE UP (ref 5–17)
AST SERPL-CCNC: 37 U/L — SIGNIFICANT CHANGE UP (ref 15–37)
BASE EXCESS BLDA CALC-SCNC: 5 MMOL/L — HIGH (ref -2–2)
BILIRUB SERPL-MCNC: 1.3 MG/DL — HIGH (ref 0.2–1.2)
BLOOD GAS COMMENTS ARTERIAL: SIGNIFICANT CHANGE UP
BUN SERPL-MCNC: 24 MG/DL — HIGH (ref 7–23)
CALCIUM SERPL-MCNC: 8.3 MG/DL — LOW (ref 8.5–10.1)
CHLORIDE SERPL-SCNC: 97 MMOL/L — SIGNIFICANT CHANGE UP (ref 96–108)
CO2 SERPL-SCNC: 32 MMOL/L — HIGH (ref 22–31)
CREAT SERPL-MCNC: 0.64 MG/DL — SIGNIFICANT CHANGE UP (ref 0.5–1.3)
CULTURE RESULTS: SIGNIFICANT CHANGE UP
GLUCOSE SERPL-MCNC: 214 MG/DL — HIGH (ref 70–99)
HCO3 BLDA-SCNC: 30 MMOL/L — HIGH (ref 21–29)
HCT VFR BLD CALC: 32.7 % — LOW (ref 39–50)
HGB BLD-MCNC: 11 G/DL — LOW (ref 13–17)
MAGNESIUM SERPL-MCNC: 2.2 MG/DL — SIGNIFICANT CHANGE UP (ref 1.6–2.6)
MCHC RBC-ENTMCNC: 32.9 PG — SIGNIFICANT CHANGE UP (ref 27–34)
MCHC RBC-ENTMCNC: 33.6 GM/DL — SIGNIFICANT CHANGE UP (ref 32–36)
MCV RBC AUTO: 97.9 FL — SIGNIFICANT CHANGE UP (ref 80–100)
PCO2 BLDA: 47 MMHG — HIGH (ref 32–46)
PH BLDA: 7.42 — SIGNIFICANT CHANGE UP (ref 7.35–7.45)
PHOSPHATE SERPL-MCNC: 3.5 MG/DL — SIGNIFICANT CHANGE UP (ref 2.5–4.5)
PLATELET # BLD AUTO: 180 K/UL — SIGNIFICANT CHANGE UP (ref 150–400)
PO2 BLDA: 93 MMHG — SIGNIFICANT CHANGE UP (ref 74–108)
POTASSIUM SERPL-MCNC: 3.8 MMOL/L — SIGNIFICANT CHANGE UP (ref 3.5–5.3)
POTASSIUM SERPL-SCNC: 3.8 MMOL/L — SIGNIFICANT CHANGE UP (ref 3.5–5.3)
PROT SERPL-MCNC: 6.1 GM/DL — SIGNIFICANT CHANGE UP (ref 6–8.3)
RBC # BLD: 3.34 M/UL — LOW (ref 4.2–5.8)
RBC # FLD: 12.9 % — SIGNIFICANT CHANGE UP (ref 10.3–14.5)
SAO2 % BLDA: 97 % — HIGH (ref 92–96)
SODIUM SERPL-SCNC: 134 MMOL/L — LOW (ref 135–145)
SPECIMEN SOURCE: SIGNIFICANT CHANGE UP
WBC # BLD: 5.2 K/UL — SIGNIFICANT CHANGE UP (ref 3.8–10.5)
WBC # FLD AUTO: 5.2 K/UL — SIGNIFICANT CHANGE UP (ref 3.8–10.5)

## 2020-05-08 PROCEDURE — 99291 CRITICAL CARE FIRST HOUR: CPT

## 2020-05-08 RX ORDER — METOCLOPRAMIDE HCL 10 MG
10 TABLET ORAL EVERY 8 HOURS
Refills: 0 | Status: DISCONTINUED | OUTPATIENT
Start: 2020-05-08 | End: 2020-05-31

## 2020-05-08 RX ORDER — DILTIAZEM HCL 120 MG
10 CAPSULE, EXT RELEASE 24 HR ORAL EVERY 8 HOURS
Refills: 0 | Status: DISCONTINUED | OUTPATIENT
Start: 2020-05-08 | End: 2020-05-09

## 2020-05-08 RX ORDER — ENOXAPARIN SODIUM 100 MG/ML
40 INJECTION SUBCUTANEOUS DAILY
Refills: 0 | Status: DISCONTINUED | OUTPATIENT
Start: 2020-05-08 | End: 2020-05-22

## 2020-05-08 RX ADMIN — CLOPIDOGREL BISULFATE 75 MILLIGRAM(S): 75 TABLET, FILM COATED ORAL at 11:35

## 2020-05-08 RX ADMIN — PROPOFOL 7.07 MICROGRAM(S)/KG/MIN: 10 INJECTION, EMULSION INTRAVENOUS at 13:52

## 2020-05-08 RX ADMIN — Medication 81 MILLIGRAM(S): at 11:34

## 2020-05-08 RX ADMIN — PANTOPRAZOLE SODIUM 40 MILLIGRAM(S): 20 TABLET, DELAYED RELEASE ORAL at 11:34

## 2020-05-08 RX ADMIN — Medication 100 MILLIGRAM(S): at 11:34

## 2020-05-08 RX ADMIN — GABAPENTIN 400 MILLIGRAM(S): 400 CAPSULE ORAL at 13:52

## 2020-05-08 RX ADMIN — GABAPENTIN 400 MILLIGRAM(S): 400 CAPSULE ORAL at 05:55

## 2020-05-08 RX ADMIN — MIDODRINE HYDROCHLORIDE 10 MILLIGRAM(S): 2.5 TABLET ORAL at 23:19

## 2020-05-08 RX ADMIN — Medication 166.67 MILLIGRAM(S): at 19:19

## 2020-05-08 RX ADMIN — Medication 1 MILLIGRAM(S): at 05:56

## 2020-05-08 RX ADMIN — MIDODRINE HYDROCHLORIDE 10 MILLIGRAM(S): 2.5 TABLET ORAL at 13:52

## 2020-05-08 RX ADMIN — GABAPENTIN 400 MILLIGRAM(S): 400 CAPSULE ORAL at 23:19

## 2020-05-08 RX ADMIN — CHLORHEXIDINE GLUCONATE 15 MILLILITER(S): 213 SOLUTION TOPICAL at 18:59

## 2020-05-08 RX ADMIN — Medication 166.67 MILLIGRAM(S): at 05:56

## 2020-05-08 RX ADMIN — Medication 10 MILLIGRAM(S): at 11:35

## 2020-05-08 RX ADMIN — MIDAZOLAM HYDROCHLORIDE 2.36 MG/KG/HR: 1 INJECTION, SOLUTION INTRAMUSCULAR; INTRAVENOUS at 07:48

## 2020-05-08 RX ADMIN — PROPOFOL 7.07 MICROGRAM(S)/KG/MIN: 10 INJECTION, EMULSION INTRAVENOUS at 23:19

## 2020-05-08 RX ADMIN — CHLORHEXIDINE GLUCONATE 15 MILLILITER(S): 213 SOLUTION TOPICAL at 05:55

## 2020-05-08 RX ADMIN — Medication 1 MILLIGRAM(S): at 11:34

## 2020-05-08 RX ADMIN — Medication 650 MILLIGRAM(S): at 11:38

## 2020-05-08 RX ADMIN — Medication 10 MILLIGRAM(S): at 11:38

## 2020-05-08 RX ADMIN — SENNA PLUS 2 TABLET(S): 8.6 TABLET ORAL at 23:19

## 2020-05-08 RX ADMIN — Medication 15 MILLILITER(S): at 11:34

## 2020-05-08 RX ADMIN — HEPARIN SODIUM 5000 UNIT(S): 5000 INJECTION INTRAVENOUS; SUBCUTANEOUS at 05:56

## 2020-05-08 RX ADMIN — ENOXAPARIN SODIUM 40 MILLIGRAM(S): 100 INJECTION SUBCUTANEOUS at 11:34

## 2020-05-08 NOTE — PROGRESS NOTE ADULT - ASSESSMENT
A/P: 65 male with acute respiratory failure from altered awareness and sedation  COPD  AFIB s/p watchman  CHF  HTN  ETOH abuse    Plan:   PULM: Wean again today--  D/W the patient daughter about a Trach again  Cardio: Continue ASA, Plavix  Renal: Cr. Stable  GI: Feeds, PPI  ID: Vanco MRSA PNA, blood cultures negative   PSY: Ativan 1mg  ATC PRN, Thiamine for chronic ETOH abuse and thiamine deficiency folate    Lovenox DVT Prophylaxis    D/W the patients daughter

## 2020-05-08 NOTE — PROGRESS NOTE ADULT - SUBJECTIVE AND OBJECTIVE BOX
Subjective:    pat on vent, no new events, on levophed/angelic/propohyl.    Home Medications:  albuterol 2.5 mg/3 mL (0.083%) inhalation solution: 3 milliliter(s) inhaled every 6 hours, As Needed -for shortness of breath and/or wheezing (19 Apr 2020 03:12)  gabapentin 400 mg oral capsule: 1 cap(s) orally 3 times a day (19 Apr 2020 03:12)  pantoprazole 40 mg oral granule, enteric coated: 40 milligram(s) orally once a day (19 Apr 2020 03:12)  Spiriva 18 mcg inhalation capsule: 1 cap(s) inhaled once a day (19 Apr 2020 03:12)  tamsulosin 0.4 mg oral capsule: 1 cap(s) orally once a day (at bedtime) (19 Apr 2020 03:12)  traZODone 50 mg oral tablet: 2 tab(s) orally once a day (at bedtime), As Needed (19 Apr 2020 03:12)    MEDICATIONS  (STANDING):  ALBUTerol    90 MICROgram(s) HFA Inhaler 2 Puff(s) Inhalation every 4 hours  aspirin  chewable 81 milliGRAM(s) Oral daily  budesonide 160 MICROgram(s)/formoterol 4.5 MICROgram(s) Inhaler 2 Puff(s) Inhalation two times a day  chlorhexidine 0.12% Liquid 15 milliLiter(s) Oral Mucosa every 12 hours  clopidogrel Tablet 75 milliGRAM(s) Oral daily  enoxaparin Injectable 40 milliGRAM(s) SubCutaneous daily  folic acid 1 milliGRAM(s) Oral daily  gabapentin 400 milliGRAM(s) Oral three times a day  insulin lispro (HumaLOG) corrective regimen sliding scale.   SubCutaneous every 6 hours  midodrine 10 milliGRAM(s) Oral every 8 hours  multivitamin/minerals/iron Oral Solution (CENTRUM) 15 milliLiter(s) Oral daily  norepinephrine Infusion 0.05 MICROgram(s)/kG/Min (11.1 mL/Hr) IV Continuous <Continuous>  pantoprazole  Injectable 40 milliGRAM(s) IV Push daily  phenylephrine    Infusion 0.1 MICROgram(s)/kG/Min (4.42 mL/Hr) IV Continuous <Continuous>  propofol Infusion 10 MICROgram(s)/kG/Min (7.07 mL/Hr) IV Continuous <Continuous>  senna 2 Tablet(s) Oral at bedtime  thiamine 100 milliGRAM(s) Oral daily  tiotropium 18 MICROgram(s) Capsule 1 Capsule(s) Inhalation daily  vancomycin  IVPB 1250 milliGRAM(s) IV Intermittent every 12 hours    MEDICATIONS  (PRN):  acetaminophen   Tablet .. 650 milliGRAM(s) Oral every 6 hours PRN Temp greater or equal to 38.5C (101.3F)  diltiazem Injectable 10 milliGRAM(s) IV Push every 8 hours PRN heart rate above 110  LORazepam   Injectable 1 milliGRAM(s) IV Push every 6 hours PRN Agitation or Anxiety  metoclopramide Injectable 10 milliGRAM(s) IV Push every 8 hours PRN Vomiting  polyethylene glycol 3350 17 Gram(s) Oral daily PRN Constipation      Allergies    Ceclor (Rash)  Duricef (Rash)    Intolerances        Vital Signs Last 24 Hrs  T(C): 38 (08 May 2020 13:00), Max: 38.8 (07 May 2020 17:00)  T(F): 100.4 (08 May 2020 13:00), Max: 101.9 (07 May 2020 17:00)  HR: 123 (08 May 2020 10:00) (52 - 123)  BP: 137/83 (08 May 2020 10:00) (100/56 - 146/71)  BP(mean): 93 (08 May 2020 10:00) (63 - 93)  RR: 19 (08 May 2020 10:00) (15 - 21)  SpO2: 99% (08 May 2020 10:00) (93% - 100%)  Mode: AC/ CMV (Assist Control/ Continuous Mandatory Ventilation)  RR (machine): 12  TV (machine): 550  FiO2: 40  PEEP: 8  ITime: 1  MAP: 14  PIP: 27      PHYSICAL EXAMINATION:    NECK:  Supple. No lymphadenopathy. Jugular venous pressure not elevated. Carotids equal.   HEART:   The cardiac impulse has a normal quality. Reg., Nl S1 and S2.  There are no murmurs, rubs or gallops noted  CHEST:  Chest decerese air entry to auscultation. Normal respiratory effort.  ABDOMEN:  Soft and nontender.   EXTREMITIES:  There is no edema.       LABS:                        11.0   5.20  )-----------( 180      ( 08 May 2020 06:21 )             32.7     05-08    134<L>  |  97  |  24<H>  ----------------------------<  214<H>  3.8   |  32<H>  |  0.64    Ca    8.3<L>      08 May 2020 06:21  Phos  3.5     05-08  Mg     2.2     05-08    TPro  6.1  /  Alb  1.6<L>  /  TBili  1.3<H>  /  DBili  x   /  AST  37  /  ALT  40  /  AlkPhos  132<H>  05-08

## 2020-05-08 NOTE — PROGRESS NOTE ADULT - ASSESSMENT
PROBLEMS;    Ac on chronic hypercapnic & hypoxamic respiratory failure-s/p extubation-worsening ventilation  sputum-Few Pseudomonas aeruginosa (Carbapenem Resistant)/Moderate Methicillin resistant Staphylococcus aureus  afib with RVR  OHS/VIJAY  AC flare of COPD/chronic vs acute on chronic bronchitis  Mild interstitial lung disease-NSIP h/o smoking  COVID negativex2  Pulmonary hypertension  Nonobstructing stone in the left ureterovesicular junction/ nonobstructing stone in the lower pole right kidney.  Subacute hemorrhage in the right rectus abdominis along the anterior sheath, measures 1.6 cm in thickness and extends from the umbilicus to the inferior myotendinous junction  Cirrhosis  Mild splenomegaly-portal venous hypertension.  Chronic afib w Watchman device  CHF  BPH  GERD  ETOH abuse  seizures disorder    PLAN;    vent support-pat multiple failed weanin- need a trach  iv prophyl  iv levo/angelic for Bp   iv vanco  aerosols  supportive care  covid repeat testing-neg  d/w staff

## 2020-05-08 NOTE — PROGRESS NOTE ADULT - SUBJECTIVE AND OBJECTIVE BOX
HPI: Patient seen at a  this morning for AMS and hypoxic  Patient had been admitted with a COPD exacerbation.  It was believed that he went into ETOH withdrawal and had a total of 6MG Ativan over night.  He required intubation 4/23.    4/24: he remains intubated.  Will try to wean this afternoon.      4/25: He weaned this morning and failed with a decreased Tv.  But close to extubation.    4/26: Patient weaned well this morning and was extubated.      5/2: Chart reviewed, Patient was reintubated, failed weaning this morning, CXR with improving infiltrates  5/3: Patient remains on the vent failed weaning x3 today with increased RR and low TV, Off Levophed now  5/4: Failed weaning yesterday, had to change his ET tube yesterday because he chewed through it,     5/5: Pt on Precedex and Diprivan for sedation, Patient failed weaning again today  5/6: Patient failed weaning today with a low TV and High RR  5/7: He again failed weaning, NGT replaced, Fi O2 back to 40%, WBC normal,     5/8: For weaning today, he has failed weaning daily        PAST MEDICAL & SURGICAL HISTORY:  Chronic CHF  COPD without exacerbation  Atrial fibrillation  Presence of Watchman left atrial appendage closure device  Hypertension  GERD (gastroesophageal reflux disease)  Chronic obstructive pulmonary disease (COPD)  Anemia  Falls  Meningitis  Collapsed lung  Alcohol withdrawal  Emphysema of lung  Cirrhosis  CHF (congestive heart failure)  Poor historian  Alcohol abuse  Chronic atrial fibrillation  Unilateral amputation of lower extremity below knee  Presence of Watchman left atrial appendage closure device  S/P BKA (below knee amputation) unilateral, left      FAMILY HISTORY:  No pertinent family history in first degree relatives  Family history unknown: Adopted      Social Hx:    Allergies    Ceclor (Rash)  Duricef (Rash)    Intolerances            ICU Vital Signs Last 24 Hrs  T(C): 37.2 (08 May 2020 08:00), Max: 38.8 (07 May 2020 17:00)  T(F): 99 (08 May 2020 08:00), Max: 101.9 (07 May 2020 17:00)  HR: 97 (08 May 2020 09:00) (52 - 117)  BP: 121/67 (08 May 2020 09:00) (100/56 - 146/71)  BP(mean): 77 (08 May 2020 09:00) (63 - 93)  ABP: --  ABP(mean): --  RR: 15 (08 May 2020 09:00) (15 - 21)  SpO2: 96% (08 May 2020 09:00) (93% - 100%)      Mode: AC/ CMV (Assist Control/ Continuous Mandatory Ventilation)  RR (machine): 12  TV (machine): 550  FiO2: 40  PEEP: 8  ITime: 1  MAP: 14  PIP: 32      I&O's Summary    07 May 2020 07:01  -  08 May 2020 07:00  --------------------------------------------------------  IN: 1512.9 mL / OUT: 1500 mL / NET: 12.9 mL                              11.0   5.20  )-----------( 180      ( 08 May 2020 06:21 )             32.7       05-08    134<L>  |  97  |  24<H>  ----------------------------<  214<H>  3.8   |  32<H>  |  0.64    Ca    8.3<L>      08 May 2020 06:21  Phos  3.5     05-08  Mg     2.2     05-08    TPro  6.1  /  Alb  1.6<L>  /  TBili  1.3<H>  /  DBili  x   /  AST  37  /  ALT  40  /  AlkPhos  132<H>  05-08            ABG - ( 08 May 2020 06:49 )  pH, Arterial: 7.42  pH, Blood: x     /  pCO2: 47    /  pO2: 93    / HCO3: 30    / Base Excess: 5.0   /  SaO2: 97                      MEDICATIONS  (STANDING):  ALBUTerol    90 MICROgram(s) HFA Inhaler 2 Puff(s) Inhalation every 4 hours  aspirin  chewable 81 milliGRAM(s) Oral daily  budesonide 160 MICROgram(s)/formoterol 4.5 MICROgram(s) Inhaler 2 Puff(s) Inhalation two times a day  chlorhexidine 0.12% Liquid 15 milliLiter(s) Oral Mucosa every 12 hours  clopidogrel Tablet 75 milliGRAM(s) Oral daily  enoxaparin Injectable 40 milliGRAM(s) SubCutaneous daily  folic acid 1 milliGRAM(s) Oral daily  gabapentin 400 milliGRAM(s) Oral three times a day  insulin lispro (HumaLOG) corrective regimen sliding scale.   SubCutaneous every 6 hours  midodrine 10 milliGRAM(s) Oral every 8 hours  multivitamin/minerals/iron Oral Solution (CENTRUM) 15 milliLiter(s) Oral daily  norepinephrine Infusion 0.05 MICROgram(s)/kG/Min (11.1 mL/Hr) IV Continuous <Continuous>  pantoprazole  Injectable 40 milliGRAM(s) IV Push daily  phenylephrine    Infusion 0.1 MICROgram(s)/kG/Min (4.42 mL/Hr) IV Continuous <Continuous>  propofol Infusion 10 MICROgram(s)/kG/Min (7.07 mL/Hr) IV Continuous <Continuous>  senna 2 Tablet(s) Oral at bedtime  thiamine 100 milliGRAM(s) Oral daily  tiotropium 18 MICROgram(s) Capsule 1 Capsule(s) Inhalation daily  vancomycin  IVPB 1250 milliGRAM(s) IV Intermittent every 12 hours    MEDICATIONS  (PRN):  acetaminophen   Tablet .. 650 milliGRAM(s) Oral every 6 hours PRN Temp greater or equal to 38.5C (101.3F)  LORazepam   Injectable 1 milliGRAM(s) IV Push every 6 hours PRN Agitation or Anxiety  metoclopramide Injectable 10 milliGRAM(s) IV Push every 8 hours PRN Vomiting  polyethylene glycol 3350 17 Gram(s) Oral daily PRN Constipation      DVT Prophylaxis:    Advanced Directives:  Discussed with:    Visit Information: 30    ** Time is exclusive of billed procedures and/or teaching and/or routine family updates.

## 2020-05-09 LAB
A1C WITH ESTIMATED AVERAGE GLUCOSE RESULT: 6.2 % — HIGH (ref 4–5.6)
ALBUMIN SERPL ELPH-MCNC: 1.5 G/DL — LOW (ref 3.3–5)
ALP SERPL-CCNC: 172 U/L — HIGH (ref 40–120)
ALT FLD-CCNC: 50 U/L — SIGNIFICANT CHANGE UP (ref 12–78)
ANION GAP SERPL CALC-SCNC: 7 MMOL/L — SIGNIFICANT CHANGE UP (ref 5–17)
AST SERPL-CCNC: 93 U/L — HIGH (ref 15–37)
BILIRUB SERPL-MCNC: 1.3 MG/DL — HIGH (ref 0.2–1.2)
BUN SERPL-MCNC: 20 MG/DL — SIGNIFICANT CHANGE UP (ref 7–23)
CALCIUM SERPL-MCNC: 8 MG/DL — LOW (ref 8.5–10.1)
CHLORIDE SERPL-SCNC: 100 MMOL/L — SIGNIFICANT CHANGE UP (ref 96–108)
CO2 SERPL-SCNC: 26 MMOL/L — SIGNIFICANT CHANGE UP (ref 22–31)
CREAT SERPL-MCNC: 0.64 MG/DL — SIGNIFICANT CHANGE UP (ref 0.5–1.3)
ESTIMATED AVERAGE GLUCOSE: 131 MG/DL — HIGH (ref 68–114)
GLUCOSE SERPL-MCNC: 117 MG/DL — HIGH (ref 70–99)
HCT VFR BLD CALC: 33.5 % — LOW (ref 39–50)
HGB BLD-MCNC: 11.5 G/DL — LOW (ref 13–17)
MAGNESIUM SERPL-MCNC: 2.1 MG/DL — SIGNIFICANT CHANGE UP (ref 1.6–2.6)
MCHC RBC-ENTMCNC: 33 PG — SIGNIFICANT CHANGE UP (ref 27–34)
MCHC RBC-ENTMCNC: 34.3 GM/DL — SIGNIFICANT CHANGE UP (ref 32–36)
MCV RBC AUTO: 96.3 FL — SIGNIFICANT CHANGE UP (ref 80–100)
PHOSPHATE SERPL-MCNC: 3.8 MG/DL — SIGNIFICANT CHANGE UP (ref 2.5–4.5)
PLATELET # BLD AUTO: 207 K/UL — SIGNIFICANT CHANGE UP (ref 150–400)
POTASSIUM SERPL-MCNC: 4.3 MMOL/L — SIGNIFICANT CHANGE UP (ref 3.5–5.3)
POTASSIUM SERPL-SCNC: 4.3 MMOL/L — SIGNIFICANT CHANGE UP (ref 3.5–5.3)
PROT SERPL-MCNC: 6.3 GM/DL — SIGNIFICANT CHANGE UP (ref 6–8.3)
RBC # BLD: 3.48 M/UL — LOW (ref 4.2–5.8)
RBC # FLD: 12.7 % — SIGNIFICANT CHANGE UP (ref 10.3–14.5)
SODIUM SERPL-SCNC: 133 MMOL/L — LOW (ref 135–145)
WBC # BLD: 4.42 K/UL — SIGNIFICANT CHANGE UP (ref 3.8–10.5)
WBC # FLD AUTO: 4.42 K/UL — SIGNIFICANT CHANGE UP (ref 3.8–10.5)

## 2020-05-09 PROCEDURE — 99291 CRITICAL CARE FIRST HOUR: CPT

## 2020-05-09 RX ORDER — DILTIAZEM HCL 120 MG
30 CAPSULE, EXT RELEASE 24 HR ORAL EVERY 6 HOURS
Refills: 0 | Status: DISCONTINUED | OUTPATIENT
Start: 2020-05-09 | End: 2020-05-14

## 2020-05-09 RX ADMIN — CLOPIDOGREL BISULFATE 75 MILLIGRAM(S): 75 TABLET, FILM COATED ORAL at 10:32

## 2020-05-09 RX ADMIN — Medication 15 MILLILITER(S): at 14:18

## 2020-05-09 RX ADMIN — PANTOPRAZOLE SODIUM 40 MILLIGRAM(S): 20 TABLET, DELAYED RELEASE ORAL at 10:31

## 2020-05-09 RX ADMIN — PROPOFOL 7.07 MICROGRAM(S)/KG/MIN: 10 INJECTION, EMULSION INTRAVENOUS at 00:52

## 2020-05-09 RX ADMIN — MIDODRINE HYDROCHLORIDE 10 MILLIGRAM(S): 2.5 TABLET ORAL at 21:21

## 2020-05-09 RX ADMIN — Medication 650 MILLIGRAM(S): at 05:12

## 2020-05-09 RX ADMIN — Medication 166.67 MILLIGRAM(S): at 05:22

## 2020-05-09 RX ADMIN — MIDODRINE HYDROCHLORIDE 10 MILLIGRAM(S): 2.5 TABLET ORAL at 05:12

## 2020-05-09 RX ADMIN — GABAPENTIN 400 MILLIGRAM(S): 400 CAPSULE ORAL at 21:21

## 2020-05-09 RX ADMIN — Medication 1 MILLIGRAM(S): at 10:32

## 2020-05-09 RX ADMIN — CHLORHEXIDINE GLUCONATE 15 MILLILITER(S): 213 SOLUTION TOPICAL at 05:04

## 2020-05-09 RX ADMIN — CHLORHEXIDINE GLUCONATE 15 MILLILITER(S): 213 SOLUTION TOPICAL at 17:57

## 2020-05-09 RX ADMIN — ENOXAPARIN SODIUM 40 MILLIGRAM(S): 100 INJECTION SUBCUTANEOUS at 10:31

## 2020-05-09 RX ADMIN — Medication 81 MILLIGRAM(S): at 10:32

## 2020-05-09 RX ADMIN — Medication 10 MILLIGRAM(S): at 10:31

## 2020-05-09 RX ADMIN — Medication 10 MILLIGRAM(S): at 21:21

## 2020-05-09 RX ADMIN — Medication 166.67 MILLIGRAM(S): at 18:09

## 2020-05-09 RX ADMIN — GABAPENTIN 400 MILLIGRAM(S): 400 CAPSULE ORAL at 05:12

## 2020-05-09 RX ADMIN — PROPOFOL 7.07 MICROGRAM(S)/KG/MIN: 10 INJECTION, EMULSION INTRAVENOUS at 02:28

## 2020-05-09 RX ADMIN — GABAPENTIN 400 MILLIGRAM(S): 400 CAPSULE ORAL at 14:19

## 2020-05-09 RX ADMIN — Medication 10 MILLIGRAM(S): at 02:28

## 2020-05-09 RX ADMIN — Medication 30 MILLIGRAM(S): at 14:19

## 2020-05-09 RX ADMIN — Medication 100 MILLIGRAM(S): at 10:32

## 2020-05-09 NOTE — PROGRESS NOTE ADULT - SUBJECTIVE AND OBJECTIVE BOX
Subjective:    pat still on vent, waiting trach, no respiratory complaint.    Home Medications:  albuterol 2.5 mg/3 mL (0.083%) inhalation solution: 3 milliliter(s) inhaled every 6 hours, As Needed -for shortness of breath and/or wheezing (19 Apr 2020 03:12)  gabapentin 400 mg oral capsule: 1 cap(s) orally 3 times a day (19 Apr 2020 03:12)  pantoprazole 40 mg oral granule, enteric coated: 40 milligram(s) orally once a day (19 Apr 2020 03:12)  Spiriva 18 mcg inhalation capsule: 1 cap(s) inhaled once a day (19 Apr 2020 03:12)  tamsulosin 0.4 mg oral capsule: 1 cap(s) orally once a day (at bedtime) (19 Apr 2020 03:12)  traZODone 50 mg oral tablet: 2 tab(s) orally once a day (at bedtime), As Needed (19 Apr 2020 03:12)    MEDICATIONS  (STANDING):  ALBUTerol    90 MICROgram(s) HFA Inhaler 2 Puff(s) Inhalation every 4 hours  aspirin  chewable 81 milliGRAM(s) Oral daily  budesonide 160 MICROgram(s)/formoterol 4.5 MICROgram(s) Inhaler 2 Puff(s) Inhalation two times a day  chlorhexidine 0.12% Liquid 15 milliLiter(s) Oral Mucosa every 12 hours  clopidogrel Tablet 75 milliGRAM(s) Oral daily  enoxaparin Injectable 40 milliGRAM(s) SubCutaneous daily  folic acid 1 milliGRAM(s) Oral daily  gabapentin 400 milliGRAM(s) Oral three times a day  insulin lispro (HumaLOG) corrective regimen sliding scale.   SubCutaneous every 6 hours  midodrine 10 milliGRAM(s) Oral every 8 hours  multivitamin/minerals/iron Oral Solution (CENTRUM) 15 milliLiter(s) Oral daily  pantoprazole  Injectable 40 milliGRAM(s) IV Push daily  propofol Infusion 10 MICROgram(s)/kG/Min (7.07 mL/Hr) IV Continuous <Continuous>  senna 2 Tablet(s) Oral at bedtime  thiamine 100 milliGRAM(s) Oral daily  tiotropium 18 MICROgram(s) Capsule 1 Capsule(s) Inhalation daily  vancomycin  IVPB 1250 milliGRAM(s) IV Intermittent every 12 hours    MEDICATIONS  (PRN):  acetaminophen   Tablet .. 650 milliGRAM(s) Oral every 6 hours PRN Temp greater or equal to 38.5C (101.3F)  diltiazem    Tablet 30 milliGRAM(s) Oral every 6 hours PRN HR > 110  metoclopramide Injectable 10 milliGRAM(s) IV Push every 8 hours PRN Vomiting  polyethylene glycol 3350 17 Gram(s) Oral daily PRN Constipation      Allergies    Ceclor (Rash)  Duricef (Rash)    Intolerances        Vital Signs Last 24 Hrs  T(C): 36.1 (09 May 2020 07:57), Max: 37.9 (08 May 2020 16:00)  T(F): 96.9 (09 May 2020 07:57), Max: 100.3 (08 May 2020 16:00)  HR: 108 (09 May 2020 13:00) (77 - 123)  BP: 133/69 (09 May 2020 13:00) (109/69 - 150/84)  BP(mean): 82 (09 May 2020 13:00) (71 - 98)  RR: --  SpO2: 96% (09 May 2020 13:00) (90% - 100%)  Mode: CPAP with PS  PEEP: 8  PS: 10  ITime: 1      PHYSICAL EXAMINATION:    NECK:  Supple. No lymphadenopathy. Jugular venous pressure not elevated. Carotids equal.   HEART:   The cardiac impulse has a normal quality. Reg., Nl S1 and S2.  There are no murmurs, rubs or gallops noted  CHEST:  Chest rhonchi to auscultation. Normal respiratory effort.  ABDOMEN:  Soft and nontender.   EXTREMITIES:  There is no edema.       LABS:                        11.5   4.42  )-----------( 207      ( 09 May 2020 09:49 )             33.5     05-09    133<L>  |  100  |  20  ----------------------------<  117<H>  4.3   |  26  |  0.64    Ca    8.0<L>      09 May 2020 09:49  Phos  3.8     05-09  Mg     2.1     05-09    TPro  6.3  /  Alb  1.5<L>  /  TBili  1.3<H>  /  DBili  x   /  AST  93<H>  /  ALT  50  /  AlkPhos  172<H>  05-09

## 2020-05-09 NOTE — PROGRESS NOTE ADULT - SUBJECTIVE AND OBJECTIVE BOX
HPI: Patient seen at a  this morning for AMS and hypoxic  Patient had been admitted with a COPD exacerbation.  It was believed that he went into ETOH withdrawal and had a total of 6MG Ativan over night.  He required intubation 4/23.    4/24: he remains intubated.  Will try to wean this afternoon.      4/25: He weaned this morning and failed with a decreased Tv.  But close to extubation.    4/26: Patient weaned well this morning and was extubated.      5/2: Chart reviewed, Patient was reintubated, failed weaning this morning, CXR with improving infiltrates  5/3: Patient remains on the vent failed weaning x3 today with increased RR and low TV, Off Levophed now  5/4: Failed weaning yesterday, had to change his ET tube yesterday because he chewed through it,     5/5: Pt on Precedex and Diprivan for sedation, Patient failed weaning again today  5/6: Patient failed weaning today with a low TV and High RR  5/7: He again failed weaning, NGT replaced, Fi O2 back to 40%, WBC normal,     5/8: For weaning today, he has failed weaning daily  5/9: Weaning this morning off levo        PAST MEDICAL HX  Alcohol abuse    Alcohol withdrawal    Anemia    AFIB atrial fibrillation    CHF (congestive heart failure)    Chronic atrial fibrillation    Chronic CHF    Chronic obstructive pulmonary disease (COPD)    Cirrhosis    Collapsed lung    COPD without exacerbation    Emphysema of lung    Falls    GERD (gastroesophageal reflux disease)    HTN hypertension    Meningitis    Poor historian    Presence of Watchman left atrial appendage closure device.    PAST SURGICAL HX  Presence of Watchman left atrial appendage closure device    S/P BKA (below knee amputation) unilateral, left    Unilateral amputation of lower extremity below knee.      FAMILY HX  Family history unknown: Adopted      SOCIAL HX  lives alone  former smoker  alcohol use last 2-3 days ago (19 Apr 2020 03:19)       PAST MEDICAL & SURGICAL HISTORY:  Chronic CHF  COPD without exacerbation  Atrial fibrillation  Presence of Watchman left atrial appendage closure device  Hypertension  GERD (gastroesophageal reflux disease)  Chronic obstructive pulmonary disease (COPD)  Anemia  Falls  Meningitis  Collapsed lung  Alcohol withdrawal  Emphysema of lung  Cirrhosis  CHF (congestive heart failure)  Poor historian  Alcohol abuse  Chronic atrial fibrillation  Unilateral amputation of lower extremity below knee  Presence of Watchman left atrial appendage closure device  S/P BKA (below knee amputation) unilateral, left      FAMILY HISTORY:  No pertinent family history in first degree relatives  Family history unknown: Adopted      Social Hx:    Allergies    Ceclor (Rash)  Duricef (Rash)    Intolerances            ICU Vital Signs Last 24 Hrs  T(C): 36.1 (09 May 2020 07:57), Max: 38 (08 May 2020 13:00)  T(F): 96.9 (09 May 2020 07:57), Max: 100.4 (08 May 2020 13:00)  HR: 103 (09 May 2020 12:00) (77 - 123)  BP: 131/61 (09 May 2020 12:00) (100/61 - 150/84)  BP(mean): 78 (09 May 2020 12:00) (68 - 98)  ABP: --  ABP(mean): --  RR: --  SpO2: 97% (09 May 2020 12:00) (90% - 100%)      Mode: CPAP with PS  PEEP: 8  PS: 10  ITime: 1      I&O's Summary    08 May 2020 07:01  -  09 May 2020 07:00  --------------------------------------------------------  IN: 2050 mL / OUT: 1525 mL / NET: 525 mL                              11.5   4.42  )-----------( 207      ( 09 May 2020 09:49 )             33.5       05-09    133<L>  |  100  |  20  ----------------------------<  117<H>  4.3   |  26  |  0.64    Ca    8.0<L>      09 May 2020 09:49  Phos  3.8     05-09  Mg     2.1     05-09    TPro  6.3  /  Alb  1.5<L>  /  TBili  1.3<H>  /  DBili  x   /  AST  93<H>  /  ALT  50  /  AlkPhos  172<H>  05-09            ABG - ( 08 May 2020 06:49 )  pH, Arterial: 7.42  pH, Blood: x     /  pCO2: 47    /  pO2: 93    / HCO3: 30    / Base Excess: 5.0   /  SaO2: 97                      MEDICATIONS  (STANDING):  ALBUTerol    90 MICROgram(s) HFA Inhaler 2 Puff(s) Inhalation every 4 hours  aspirin  chewable 81 milliGRAM(s) Oral daily  budesonide 160 MICROgram(s)/formoterol 4.5 MICROgram(s) Inhaler 2 Puff(s) Inhalation two times a day  chlorhexidine 0.12% Liquid 15 milliLiter(s) Oral Mucosa every 12 hours  clopidogrel Tablet 75 milliGRAM(s) Oral daily  enoxaparin Injectable 40 milliGRAM(s) SubCutaneous daily  folic acid 1 milliGRAM(s) Oral daily  gabapentin 400 milliGRAM(s) Oral three times a day  insulin lispro (HumaLOG) corrective regimen sliding scale.   SubCutaneous every 6 hours  midodrine 10 milliGRAM(s) Oral every 8 hours  multivitamin/minerals/iron Oral Solution (CENTRUM) 15 milliLiter(s) Oral daily  pantoprazole  Injectable 40 milliGRAM(s) IV Push daily  propofol Infusion 10 MICROgram(s)/kG/Min (7.07 mL/Hr) IV Continuous <Continuous>  senna 2 Tablet(s) Oral at bedtime  thiamine 100 milliGRAM(s) Oral daily  tiotropium 18 MICROgram(s) Capsule 1 Capsule(s) Inhalation daily  vancomycin  IVPB 1250 milliGRAM(s) IV Intermittent every 12 hours    MEDICATIONS  (PRN):  acetaminophen   Tablet .. 650 milliGRAM(s) Oral every 6 hours PRN Temp greater or equal to 38.5C (101.3F)  diltiazem    Tablet 30 milliGRAM(s) Oral every 6 hours PRN HR > 110  metoclopramide Injectable 10 milliGRAM(s) IV Push every 8 hours PRN Vomiting  polyethylene glycol 3350 17 Gram(s) Oral daily PRN Constipation      DVT Prophylaxis:    Advanced Directives:  Discussed with:    Visit Information: 30 min    ** Time is exclusive of billed procedures and/or teaching and/or routine family updates.

## 2020-05-10 LAB
ALBUMIN SERPL ELPH-MCNC: 1.6 G/DL — LOW (ref 3.3–5)
ALP SERPL-CCNC: 177 U/L — HIGH (ref 40–120)
ALT FLD-CCNC: 53 U/L — SIGNIFICANT CHANGE UP (ref 12–78)
ANION GAP SERPL CALC-SCNC: 5 MMOL/L — SIGNIFICANT CHANGE UP (ref 5–17)
AST SERPL-CCNC: 50 U/L — HIGH (ref 15–37)
BILIRUB SERPL-MCNC: 1 MG/DL — SIGNIFICANT CHANGE UP (ref 0.2–1.2)
BUN SERPL-MCNC: 16 MG/DL — SIGNIFICANT CHANGE UP (ref 7–23)
CALCIUM SERPL-MCNC: 8.6 MG/DL — SIGNIFICANT CHANGE UP (ref 8.5–10.1)
CHLORIDE SERPL-SCNC: 102 MMOL/L — SIGNIFICANT CHANGE UP (ref 96–108)
CO2 SERPL-SCNC: 33 MMOL/L — HIGH (ref 22–31)
CREAT SERPL-MCNC: 0.66 MG/DL — SIGNIFICANT CHANGE UP (ref 0.5–1.3)
GLUCOSE SERPL-MCNC: 121 MG/DL — HIGH (ref 70–99)
HCT VFR BLD CALC: 32.2 % — LOW (ref 39–50)
HGB BLD-MCNC: 10.4 G/DL — LOW (ref 13–17)
MAGNESIUM SERPL-MCNC: 2 MG/DL — SIGNIFICANT CHANGE UP (ref 1.6–2.6)
MCHC RBC-ENTMCNC: 31.7 PG — SIGNIFICANT CHANGE UP (ref 27–34)
MCHC RBC-ENTMCNC: 32.3 GM/DL — SIGNIFICANT CHANGE UP (ref 32–36)
MCV RBC AUTO: 98.2 FL — SIGNIFICANT CHANGE UP (ref 80–100)
PHOSPHATE SERPL-MCNC: 3.5 MG/DL — SIGNIFICANT CHANGE UP (ref 2.5–4.5)
PLATELET # BLD AUTO: 230 K/UL — SIGNIFICANT CHANGE UP (ref 150–400)
POTASSIUM SERPL-MCNC: 3.7 MMOL/L — SIGNIFICANT CHANGE UP (ref 3.5–5.3)
POTASSIUM SERPL-SCNC: 3.7 MMOL/L — SIGNIFICANT CHANGE UP (ref 3.5–5.3)
PROT SERPL-MCNC: 6 GM/DL — SIGNIFICANT CHANGE UP (ref 6–8.3)
RBC # BLD: 3.28 M/UL — LOW (ref 4.2–5.8)
RBC # FLD: 12.8 % — SIGNIFICANT CHANGE UP (ref 10.3–14.5)
SODIUM SERPL-SCNC: 140 MMOL/L — SIGNIFICANT CHANGE UP (ref 135–145)
WBC # BLD: 3.93 K/UL — SIGNIFICANT CHANGE UP (ref 3.8–10.5)
WBC # FLD AUTO: 3.93 K/UL — SIGNIFICANT CHANGE UP (ref 3.8–10.5)

## 2020-05-10 PROCEDURE — 99291 CRITICAL CARE FIRST HOUR: CPT

## 2020-05-10 RX ADMIN — SENNA PLUS 2 TABLET(S): 8.6 TABLET ORAL at 21:53

## 2020-05-10 RX ADMIN — Medication 166.67 MILLIGRAM(S): at 05:38

## 2020-05-10 RX ADMIN — Medication 10 MILLIGRAM(S): at 12:07

## 2020-05-10 RX ADMIN — Medication 1 MILLIGRAM(S): at 12:07

## 2020-05-10 RX ADMIN — PROPOFOL 7.07 MICROGRAM(S)/KG/MIN: 10 INJECTION, EMULSION INTRAVENOUS at 20:30

## 2020-05-10 RX ADMIN — CHLORHEXIDINE GLUCONATE 15 MILLILITER(S): 213 SOLUTION TOPICAL at 17:23

## 2020-05-10 RX ADMIN — MIDODRINE HYDROCHLORIDE 10 MILLIGRAM(S): 2.5 TABLET ORAL at 05:37

## 2020-05-10 RX ADMIN — GABAPENTIN 400 MILLIGRAM(S): 400 CAPSULE ORAL at 05:37

## 2020-05-10 RX ADMIN — Medication 81 MILLIGRAM(S): at 12:07

## 2020-05-10 RX ADMIN — ENOXAPARIN SODIUM 40 MILLIGRAM(S): 100 INJECTION SUBCUTANEOUS at 12:07

## 2020-05-10 RX ADMIN — CHLORHEXIDINE GLUCONATE 15 MILLILITER(S): 213 SOLUTION TOPICAL at 05:09

## 2020-05-10 RX ADMIN — Medication 15 MILLILITER(S): at 12:07

## 2020-05-10 RX ADMIN — GABAPENTIN 400 MILLIGRAM(S): 400 CAPSULE ORAL at 12:08

## 2020-05-10 RX ADMIN — Medication 10 MILLIGRAM(S): at 21:53

## 2020-05-10 RX ADMIN — Medication 30 MILLIGRAM(S): at 21:55

## 2020-05-10 RX ADMIN — GABAPENTIN 400 MILLIGRAM(S): 400 CAPSULE ORAL at 21:53

## 2020-05-10 RX ADMIN — MIDODRINE HYDROCHLORIDE 10 MILLIGRAM(S): 2.5 TABLET ORAL at 21:53

## 2020-05-10 RX ADMIN — Medication 100 MILLIGRAM(S): at 12:07

## 2020-05-10 RX ADMIN — CLOPIDOGREL BISULFATE 75 MILLIGRAM(S): 75 TABLET, FILM COATED ORAL at 12:07

## 2020-05-10 RX ADMIN — PANTOPRAZOLE SODIUM 40 MILLIGRAM(S): 20 TABLET, DELAYED RELEASE ORAL at 12:07

## 2020-05-10 RX ADMIN — Medication 650 MILLIGRAM(S): at 12:08

## 2020-05-10 NOTE — PROGRESS NOTE ADULT - SUBJECTIVE AND OBJECTIVE BOX
Subjective:    pat on cpap, tachpneic, with low -300.    Home Medications:  albuterol 2.5 mg/3 mL (0.083%) inhalation solution: 3 milliliter(s) inhaled every 6 hours, As Needed -for shortness of breath and/or wheezing (19 Apr 2020 03:12)  gabapentin 400 mg oral capsule: 1 cap(s) orally 3 times a day (19 Apr 2020 03:12)  pantoprazole 40 mg oral granule, enteric coated: 40 milligram(s) orally once a day (19 Apr 2020 03:12)  Spiriva 18 mcg inhalation capsule: 1 cap(s) inhaled once a day (19 Apr 2020 03:12)  tamsulosin 0.4 mg oral capsule: 1 cap(s) orally once a day (at bedtime) (19 Apr 2020 03:12)  traZODone 50 mg oral tablet: 2 tab(s) orally once a day (at bedtime), As Needed (19 Apr 2020 03:12)    MEDICATIONS  (STANDING):  ALBUTerol    90 MICROgram(s) HFA Inhaler 2 Puff(s) Inhalation every 4 hours  aspirin  chewable 81 milliGRAM(s) Oral daily  budesonide 160 MICROgram(s)/formoterol 4.5 MICROgram(s) Inhaler 2 Puff(s) Inhalation two times a day  chlorhexidine 0.12% Liquid 15 milliLiter(s) Oral Mucosa every 12 hours  clopidogrel Tablet 75 milliGRAM(s) Oral daily  enoxaparin Injectable 40 milliGRAM(s) SubCutaneous daily  folic acid 1 milliGRAM(s) Oral daily  gabapentin 400 milliGRAM(s) Oral three times a day  midodrine 10 milliGRAM(s) Oral every 8 hours  multivitamin/minerals/iron Oral Solution (CENTRUM) 15 milliLiter(s) Oral daily  pantoprazole  Injectable 40 milliGRAM(s) IV Push daily  propofol Infusion 10 MICROgram(s)/kG/Min (7.07 mL/Hr) IV Continuous <Continuous>  senna 2 Tablet(s) Oral at bedtime  thiamine 100 milliGRAM(s) Oral daily  tiotropium 18 MICROgram(s) Capsule 1 Capsule(s) Inhalation daily    MEDICATIONS  (PRN):  acetaminophen   Tablet .. 650 milliGRAM(s) Oral every 6 hours PRN Temp greater or equal to 38.5C (101.3F)  diltiazem    Tablet 30 milliGRAM(s) Oral every 6 hours PRN HR > 110  metoclopramide Injectable 10 milliGRAM(s) IV Push every 8 hours PRN Vomiting  polyethylene glycol 3350 17 Gram(s) Oral daily PRN Constipation      Allergies    Ceclor (Rash)  Duricef (Rash)    Intolerances        Vital Signs Last 24 Hrs  T(C): 37.2 (10 May 2020 05:45), Max: 37.5 (10 May 2020 00:17)  T(F): 99 (10 May 2020 05:45), Max: 99.5 (10 May 2020 00:17)  HR: 87 (10 May 2020 12:00) (72 - 109)  BP: 115/73 (10 May 2020 11:00) (115/73 - 149/86)  BP(mean): 82 (10 May 2020 11:00) (70 - 96)  RR: 16 (10 May 2020 12:00) (14 - 42)  SpO2: 98% (10 May 2020 12:00) (94% - 100%)  Mode: AC/ CMV (Assist Control/ Continuous Mandatory Ventilation)  RR (machine): 12  TV (machine): 550  FiO2: 30  PEEP: 8  ITime: 1  PIP: 28      PHYSICAL EXAMINATION:    NECK:  Supple. No lymphadenopathy. Jugular venous pressure not elevated. Carotids equal.   HEART:   The cardiac impulse has a normal quality. Reg., Nl S1 and S2.  There are no murmurs, rubs or gallops noted  CHEST:  Chest poor air entry to auscultation. Normal respiratory effort.  ABDOMEN:  Soft and nontender.   EXTREMITIES:  There is no edema.       LABS:                        10.4   3.93  )-----------( 230      ( 10 May 2020 07:13 )             32.2     05-10    140  |  102  |  16  ----------------------------<  121<H>  3.7   |  33<H>  |  0.66    Ca    8.6      10 May 2020 07:13  Phos  3.5     05-10  Mg     2.0     05-10    TPro  6.0  /  Alb  1.6<L>  /  TBili  1.0  /  DBili  x   /  AST  50<H>  /  ALT  53  /  AlkPhos  177<H>  05-10

## 2020-05-10 NOTE — PROGRESS NOTE ADULT - ASSESSMENT
A/P: 65 male with acute respiratory failure from altered awareness and sedation  COPD  AFIB s/p watchman  CHF  HTN  ETOH abuse    Plan:   PULM: Wean again today but failed--  D/W the patient daughter about a Trach again    Cardio: Continue ASA, Plavix    Renal: Cr. Stable    GI: Feeds, PPI    ID: Vanco MRSA PNA, blood cultures negative.  D/C Vanco on 5/10    PSY: Ativan 1mg  ATC PRN, Thiamine for chronic ETOH abuse and thiamine deficiency folate    Lovenox DVT Prophylaxis    D/W the patients daughter

## 2020-05-10 NOTE — PROGRESS NOTE ADULT - ASSESSMENT
PROBLEMS;    Ac on chronic hypercapnic & hypoxamic respiratory failure-s/p extubation-worsening ventilation  sputum-Few Pseudomonas aeruginosa (Carbapenem Resistant)/Moderate Methicillin resistant Staphylococcus aureus  afib with RVR  OHS/VIJAY  AC flare of COPD/chronic vs acute on chronic bronchitis  Mild interstitial lung disease-NSIP h/o smoking  COVID negativex2  Pulmonary hypertension  Nonobstructing stone in the left ureterovesicular junction/ nonobstructing stone in the lower pole right kidney.  Subacute hemorrhage in the right rectus abdominis along the anterior sheath, measures 1.6 cm in thickness and extends from the umbilicus to the inferior myotendinous junction  Cirrhosis  Mild splenomegaly-portal venous hypertension.  Chronic afib w Watchman device  CHF  BPH  GERD  ETOH abuse  seizures disorder    PLAN;    vent support-pat multiple failed weaning attempts- need a trach  iv prophyl  iv levo/angelic for Bp   iv vanco  aerosols  supportive care  covid repeat testing-neg  d/w staff

## 2020-05-10 NOTE — PROGRESS NOTE ADULT - SUBJECTIVE AND OBJECTIVE BOX
HPI: Patient seen at a  this morning for AMS and hypoxic  Patient had been admitted with a COPD exacerbation.  It was believed that he went into ETOH withdrawal and had a total of 6MG Ativan over night.  He required intubation 4/23.    4/24: he remains intubated.  Will try to wean this afternoon.      4/25: He weaned this morning and failed with a decreased Tv.  But close to extubation.    4/26: Patient weaned well this morning and was extubated.      5/2: Chart reviewed, Patient was reintubated, failed weaning this morning, CXR with improving infiltrates  5/3: Patient remains on the vent failed weaning x3 today with increased RR and low TV, Off Levophed now  5/4: Failed weaning yesterday, had to change his ET tube yesterday because he chewed through it,     5/5: Pt on Precedex and Diprivan for sedation, Patient failed weaning again today  5/6: Patient failed weaning today with a low TV and High RR  5/7: He again failed weaning, NGT replaced, Fi O2 back to 40%, WBC normal,     5/8: For weaning today, he has failed weaning daily  5/9: Weaning this morning off levo  5/10: He Failed weaning this morning      PAST MEDICAL HX  Alcohol abuse    Alcohol withdrawal    Anemia    AFIB atrial fibrillation    CHF (congestive heart failure)    Chronic atrial fibrillation    Chronic CHF    Chronic obstructive pulmonary disease (COPD)    Cirrhosis    Collapsed lung    COPD without exacerbation    Emphysema of lung    Falls    GERD (gastroesophageal reflux disease)    HTN hypertension    Meningitis    Poor historian    Presence of Watchman left atrial appendage closure device.    PAST SURGICAL HX  Presence of Watchman left atrial appendage closure device    S/P BKA (below knee amputation) unilateral, left    Unilateral amputation of lower extremity below knee.      FAMILY HX  Family history unknown: Adopted      SOCIAL HX  lives alone  former smoker  alcohol use last 2-3 days ago (19 Apr 2020 03:19)       PAST MEDICAL & SURGICAL HISTORY:  Chronic CHF  COPD without exacerbation  Atrial fibrillation  Presence of Watchman left atrial appendage closure device  Hypertension  GERD (gastroesophageal reflux disease)  Chronic obstructive pulmonary disease (COPD)  Anemia  Falls  Meningitis  Collapsed lung  Alcohol withdrawal  Emphysema of lung  Cirrhosis  CHF (congestive heart failure)  Poor historian  Alcohol abuse  Chronic atrial fibrillation  Unilateral amputation of lower extremity below knee  Presence of Watchman left atrial appendage closure device  S/P BKA (below knee amputation) unilateral, left      FAMILY HISTORY:  No pertinent family history in first degree relatives  Family history unknown: Adopted      Social Hx:    Allergies    Ceclor (Rash)  Duricef (Rash)    Intolerances            ICU Vital Signs Last 24 Hrs  T(C): 37.2 (10 May 2020 05:45), Max: 37.5 (10 May 2020 00:17)  T(F): 99 (10 May 2020 05:45), Max: 99.5 (10 May 2020 00:17)  HR: 86 (10 May 2020 10:00) (72 - 109)  BP: 134/77 (10 May 2020 10:00) (116/75 - 149/86)  BP(mean): 91 (10 May 2020 10:00) (70 - 96)  ABP: --  ABP(mean): --  RR: 38 (10 May 2020 10:00) (14 - 38)  SpO2: 98% (10 May 2020 10:00) (94% - 100%)      Mode: CPAP with PS  FiO2: 30  PEEP: 8  PS: 10      I&O's Summary    09 May 2020 07:01  -  10 May 2020 07:00  --------------------------------------------------------  IN: 2984 mL / OUT: 1850 mL / NET: 1134 mL                              10.4   3.93  )-----------( 230      ( 10 May 2020 07:13 )             32.2       05-10    140  |  102  |  16  ----------------------------<  121<H>  3.7   |  33<H>  |  0.66    Ca    8.6      10 May 2020 07:13  Phos  3.5     05-10  Mg     2.0     05-10    TPro  6.0  /  Alb  1.6<L>  /  TBili  1.0  /  DBili  x   /  AST  50<H>  /  ALT  53  /  AlkPhos  177<H>  05-10                    MEDICATIONS  (STANDING):  ALBUTerol    90 MICROgram(s) HFA Inhaler 2 Puff(s) Inhalation every 4 hours  aspirin  chewable 81 milliGRAM(s) Oral daily  budesonide 160 MICROgram(s)/formoterol 4.5 MICROgram(s) Inhaler 2 Puff(s) Inhalation two times a day  chlorhexidine 0.12% Liquid 15 milliLiter(s) Oral Mucosa every 12 hours  clopidogrel Tablet 75 milliGRAM(s) Oral daily  enoxaparin Injectable 40 milliGRAM(s) SubCutaneous daily  folic acid 1 milliGRAM(s) Oral daily  gabapentin 400 milliGRAM(s) Oral three times a day  midodrine 10 milliGRAM(s) Oral every 8 hours  multivitamin/minerals/iron Oral Solution (CENTRUM) 15 milliLiter(s) Oral daily  pantoprazole  Injectable 40 milliGRAM(s) IV Push daily  propofol Infusion 10 MICROgram(s)/kG/Min (7.07 mL/Hr) IV Continuous <Continuous>  senna 2 Tablet(s) Oral at bedtime  thiamine 100 milliGRAM(s) Oral daily  tiotropium 18 MICROgram(s) Capsule 1 Capsule(s) Inhalation daily    MEDICATIONS  (PRN):  acetaminophen   Tablet .. 650 milliGRAM(s) Oral every 6 hours PRN Temp greater or equal to 38.5C (101.3F)  diltiazem    Tablet 30 milliGRAM(s) Oral every 6 hours PRN HR > 110  metoclopramide Injectable 10 milliGRAM(s) IV Push every 8 hours PRN Vomiting  polyethylene glycol 3350 17 Gram(s) Oral daily PRN Constipation      DVT Prophylaxis:    Advanced Directives:  Discussed with:    Visit Information: 30 min    ** Time is exclusive of billed procedures and/or teaching and/or routine family updates.

## 2020-05-11 LAB
ALBUMIN SERPL ELPH-MCNC: 1.6 G/DL — LOW (ref 3.3–5)
ALP SERPL-CCNC: 177 U/L — HIGH (ref 40–120)
ALT FLD-CCNC: 58 U/L — SIGNIFICANT CHANGE UP (ref 12–78)
ANION GAP SERPL CALC-SCNC: 4 MMOL/L — LOW (ref 5–17)
AST SERPL-CCNC: 40 U/L — HIGH (ref 15–37)
BILIRUB SERPL-MCNC: 0.8 MG/DL — SIGNIFICANT CHANGE UP (ref 0.2–1.2)
BUN SERPL-MCNC: 15 MG/DL — SIGNIFICANT CHANGE UP (ref 7–23)
CALCIUM SERPL-MCNC: 8.5 MG/DL — SIGNIFICANT CHANGE UP (ref 8.5–10.1)
CHLORIDE SERPL-SCNC: 102 MMOL/L — SIGNIFICANT CHANGE UP (ref 96–108)
CO2 SERPL-SCNC: 33 MMOL/L — HIGH (ref 22–31)
CREAT SERPL-MCNC: 0.54 MG/DL — SIGNIFICANT CHANGE UP (ref 0.5–1.3)
GLUCOSE SERPL-MCNC: 108 MG/DL — HIGH (ref 70–99)
HCT VFR BLD CALC: 33.3 % — LOW (ref 39–50)
HGB BLD-MCNC: 10.8 G/DL — LOW (ref 13–17)
MAGNESIUM SERPL-MCNC: 2.1 MG/DL — SIGNIFICANT CHANGE UP (ref 1.6–2.6)
MCHC RBC-ENTMCNC: 31.7 PG — SIGNIFICANT CHANGE UP (ref 27–34)
MCHC RBC-ENTMCNC: 32.4 GM/DL — SIGNIFICANT CHANGE UP (ref 32–36)
MCV RBC AUTO: 97.7 FL — SIGNIFICANT CHANGE UP (ref 80–100)
PHOSPHATE SERPL-MCNC: 4 MG/DL — SIGNIFICANT CHANGE UP (ref 2.5–4.5)
PLATELET # BLD AUTO: 235 K/UL — SIGNIFICANT CHANGE UP (ref 150–400)
POTASSIUM SERPL-MCNC: 3.7 MMOL/L — SIGNIFICANT CHANGE UP (ref 3.5–5.3)
POTASSIUM SERPL-SCNC: 3.7 MMOL/L — SIGNIFICANT CHANGE UP (ref 3.5–5.3)
PROT SERPL-MCNC: 6 GM/DL — SIGNIFICANT CHANGE UP (ref 6–8.3)
RBC # BLD: 3.41 M/UL — LOW (ref 4.2–5.8)
RBC # FLD: 12.9 % — SIGNIFICANT CHANGE UP (ref 10.3–14.5)
SODIUM SERPL-SCNC: 139 MMOL/L — SIGNIFICANT CHANGE UP (ref 135–145)
WBC # BLD: 4.11 K/UL — SIGNIFICANT CHANGE UP (ref 3.8–10.5)
WBC # FLD AUTO: 4.11 K/UL — SIGNIFICANT CHANGE UP (ref 3.8–10.5)

## 2020-05-11 RX ORDER — POTASSIUM CHLORIDE 20 MEQ
40 PACKET (EA) ORAL ONCE
Refills: 0 | Status: COMPLETED | OUTPATIENT
Start: 2020-05-11 | End: 2020-05-11

## 2020-05-11 RX ORDER — DEXMEDETOMIDINE HYDROCHLORIDE IN 0.9% SODIUM CHLORIDE 4 UG/ML
0.4 INJECTION INTRAVENOUS
Qty: 200 | Refills: 0 | Status: DISCONTINUED | OUTPATIENT
Start: 2020-05-11 | End: 2020-05-13

## 2020-05-11 RX ORDER — MIDODRINE HYDROCHLORIDE 2.5 MG/1
5 TABLET ORAL EVERY 8 HOURS
Refills: 0 | Status: DISCONTINUED | OUTPATIENT
Start: 2020-05-11 | End: 2020-05-13

## 2020-05-11 RX ORDER — FENTANYL CITRATE 50 UG/ML
50 INJECTION INTRAVENOUS
Refills: 0 | Status: DISCONTINUED | OUTPATIENT
Start: 2020-05-11 | End: 2020-05-12

## 2020-05-11 RX ADMIN — DEXMEDETOMIDINE HYDROCHLORIDE IN 0.9% SODIUM CHLORIDE 11.8 MICROGRAM(S)/KG/HR: 4 INJECTION INTRAVENOUS at 13:32

## 2020-05-11 RX ADMIN — MIDODRINE HYDROCHLORIDE 10 MILLIGRAM(S): 2.5 TABLET ORAL at 05:49

## 2020-05-11 RX ADMIN — PROPOFOL 7.07 MICROGRAM(S)/KG/MIN: 10 INJECTION, EMULSION INTRAVENOUS at 05:50

## 2020-05-11 RX ADMIN — Medication 15 MILLILITER(S): at 12:46

## 2020-05-11 RX ADMIN — CHLORHEXIDINE GLUCONATE 15 MILLILITER(S): 213 SOLUTION TOPICAL at 05:43

## 2020-05-11 RX ADMIN — CHLORHEXIDINE GLUCONATE 15 MILLILITER(S): 213 SOLUTION TOPICAL at 18:33

## 2020-05-11 RX ADMIN — FENTANYL CITRATE 50 MICROGRAM(S): 50 INJECTION INTRAVENOUS at 21:43

## 2020-05-11 RX ADMIN — DEXMEDETOMIDINE HYDROCHLORIDE IN 0.9% SODIUM CHLORIDE 11.8 MICROGRAM(S)/KG/HR: 4 INJECTION INTRAVENOUS at 22:15

## 2020-05-11 RX ADMIN — PANTOPRAZOLE SODIUM 40 MILLIGRAM(S): 20 TABLET, DELAYED RELEASE ORAL at 12:48

## 2020-05-11 RX ADMIN — CLOPIDOGREL BISULFATE 75 MILLIGRAM(S): 75 TABLET, FILM COATED ORAL at 12:32

## 2020-05-11 RX ADMIN — DEXMEDETOMIDINE HYDROCHLORIDE IN 0.9% SODIUM CHLORIDE 11.8 MICROGRAM(S)/KG/HR: 4 INJECTION INTRAVENOUS at 10:28

## 2020-05-11 RX ADMIN — SENNA PLUS 2 TABLET(S): 8.6 TABLET ORAL at 21:42

## 2020-05-11 RX ADMIN — DEXMEDETOMIDINE HYDROCHLORIDE IN 0.9% SODIUM CHLORIDE 11.8 MICROGRAM(S)/KG/HR: 4 INJECTION INTRAVENOUS at 21:01

## 2020-05-11 RX ADMIN — DEXMEDETOMIDINE HYDROCHLORIDE IN 0.9% SODIUM CHLORIDE 11.8 MICROGRAM(S)/KG/HR: 4 INJECTION INTRAVENOUS at 23:24

## 2020-05-11 RX ADMIN — ENOXAPARIN SODIUM 40 MILLIGRAM(S): 100 INJECTION SUBCUTANEOUS at 12:46

## 2020-05-11 RX ADMIN — Medication 100 MILLIGRAM(S): at 12:48

## 2020-05-11 RX ADMIN — GABAPENTIN 400 MILLIGRAM(S): 400 CAPSULE ORAL at 21:42

## 2020-05-11 RX ADMIN — POLYETHYLENE GLYCOL 3350 17 GRAM(S): 17 POWDER, FOR SOLUTION ORAL at 12:31

## 2020-05-11 RX ADMIN — Medication 81 MILLIGRAM(S): at 12:46

## 2020-05-11 RX ADMIN — DEXMEDETOMIDINE HYDROCHLORIDE IN 0.9% SODIUM CHLORIDE 11.8 MICROGRAM(S)/KG/HR: 4 INJECTION INTRAVENOUS at 09:20

## 2020-05-11 RX ADMIN — Medication 1 MILLIGRAM(S): at 12:32

## 2020-05-11 RX ADMIN — Medication 40 MILLIEQUIVALENT(S): at 08:56

## 2020-05-11 RX ADMIN — DEXMEDETOMIDINE HYDROCHLORIDE IN 0.9% SODIUM CHLORIDE 11.8 MICROGRAM(S)/KG/HR: 4 INJECTION INTRAVENOUS at 20:00

## 2020-05-11 RX ADMIN — PROPOFOL 7.07 MICROGRAM(S)/KG/MIN: 10 INJECTION, EMULSION INTRAVENOUS at 02:49

## 2020-05-11 RX ADMIN — GABAPENTIN 400 MILLIGRAM(S): 400 CAPSULE ORAL at 05:49

## 2020-05-11 RX ADMIN — DEXMEDETOMIDINE HYDROCHLORIDE IN 0.9% SODIUM CHLORIDE 11.8 MICROGRAM(S)/KG/HR: 4 INJECTION INTRAVENOUS at 15:30

## 2020-05-11 RX ADMIN — GABAPENTIN 400 MILLIGRAM(S): 400 CAPSULE ORAL at 13:31

## 2020-05-11 RX ADMIN — MIDODRINE HYDROCHLORIDE 5 MILLIGRAM(S): 2.5 TABLET ORAL at 13:31

## 2020-05-11 RX ADMIN — DEXMEDETOMIDINE HYDROCHLORIDE IN 0.9% SODIUM CHLORIDE 11.8 MICROGRAM(S)/KG/HR: 4 INJECTION INTRAVENOUS at 12:31

## 2020-05-11 NOTE — PROGRESS NOTE ADULT - SUBJECTIVE AND OBJECTIVE BOX
Subjective:    pat on vent, failed weaning yesterday, on percedex.    Home Medications:  albuterol 2.5 mg/3 mL (0.083%) inhalation solution: 3 milliliter(s) inhaled every 6 hours, As Needed -for shortness of breath and/or wheezing (19 Apr 2020 03:12)  gabapentin 400 mg oral capsule: 1 cap(s) orally 3 times a day (19 Apr 2020 03:12)  pantoprazole 40 mg oral granule, enteric coated: 40 milligram(s) orally once a day (19 Apr 2020 03:12)  Spiriva 18 mcg inhalation capsule: 1 cap(s) inhaled once a day (19 Apr 2020 03:12)  tamsulosin 0.4 mg oral capsule: 1 cap(s) orally once a day (at bedtime) (19 Apr 2020 03:12)  traZODone 50 mg oral tablet: 2 tab(s) orally once a day (at bedtime), As Needed (19 Apr 2020 03:12)    MEDICATIONS  (STANDING):  ALBUTerol    90 MICROgram(s) HFA Inhaler 2 Puff(s) Inhalation every 4 hours  aspirin  chewable 81 milliGRAM(s) Oral daily  budesonide 160 MICROgram(s)/formoterol 4.5 MICROgram(s) Inhaler 2 Puff(s) Inhalation two times a day  chlorhexidine 0.12% Liquid 15 milliLiter(s) Oral Mucosa every 12 hours  clopidogrel Tablet 75 milliGRAM(s) Oral daily  dexMEDEtomidine Infusion 0.4 MICROgram(s)/kG/Hr (11.8 mL/Hr) IV Continuous <Continuous>  enoxaparin Injectable 40 milliGRAM(s) SubCutaneous daily  folic acid 1 milliGRAM(s) Oral daily  gabapentin 400 milliGRAM(s) Oral three times a day  midodrine 5 milliGRAM(s) Oral every 8 hours  multivitamin/minerals/iron Oral Solution (CENTRUM) 15 milliLiter(s) Oral daily  pantoprazole  Injectable 40 milliGRAM(s) IV Push daily  senna 2 Tablet(s) Oral at bedtime  thiamine 100 milliGRAM(s) Oral daily  tiotropium 18 MICROgram(s) Capsule 1 Capsule(s) Inhalation daily    MEDICATIONS  (PRN):  acetaminophen   Tablet .. 650 milliGRAM(s) Oral every 6 hours PRN Temp greater or equal to 38.5C (101.3F)  diltiazem    Tablet 30 milliGRAM(s) Oral every 6 hours PRN HR > 110  metoclopramide Injectable 10 milliGRAM(s) IV Push every 8 hours PRN Vomiting  polyethylene glycol 3350 17 Gram(s) Oral daily PRN Constipation      Allergies    Ceclor (Rash)  Duricef (Rash)    Intolerances        Vital Signs Last 24 Hrs  T(C): 36.9 (11 May 2020 09:00), Max: 37.1 (11 May 2020 05:00)  T(F): 98.4 (11 May 2020 09:00), Max: 98.7 (11 May 2020 05:00)  HR: 63 (11 May 2020 10:00) (63 - 126)  BP: 147/73 (11 May 2020 10:00) (113/64 - 157/92)  BP(mean): 87 (11 May 2020 10:00) (71 - 108)  RR: 17 (11 May 2020 10:00) (12 - 20)  SpO2: 100% (11 May 2020 10:00) (82% - 100%)  Mode: CPAP with PS  FiO2: 30  PEEP: 8  PS: 15      PHYSICAL EXAMINATION:    NECK:  Supple. No lymphadenopathy. Jugular venous pressure not elevated. Carotids equal.   HEART:   The cardiac impulse has a normal quality. Reg., Nl S1 and S2.  There are no murmurs, rubs or gallops noted  CHEST:  Chest crackles to auscultation. Normal respiratory effort.  ABDOMEN:  Soft and nontender.   EXTREMITIES:  There is no edema.       LABS:                        10.8   4.11  )-----------( 235      ( 11 May 2020 06:53 )             33.3     05-11    139  |  102  |  15  ----------------------------<  108<H>  3.7   |  33<H>  |  0.54    Ca    8.5      11 May 2020 06:53  Phos  4.0     05-11  Mg     2.1     05-11    TPro  6.0  /  Alb  1.6<L>  /  TBili  0.8  /  DBili  x   /  AST  40<H>  /  ALT  58  /  AlkPhos  177<H>  05-11      Xray Chest 1 View- PORTABLE-Urgent (05.07.20 @ 16:26) >  IMPRESSION:   RIGHT medial basilar infrahilar airspace consolidation and/or mass.  Continued surveillance and follow-up radiograph recommended to complete resolution to exclude other etiologies such as underlying carcinoma.

## 2020-05-11 NOTE — PROGRESS NOTE ADULT - SUBJECTIVE AND OBJECTIVE BOX
Patient is a 65y old  Male who presents with a chief complaint of acute hypoxemic, hypercapneic resp failure  COPD exacerbation (10 May 2020 12:52)      BRIEF HOSPITAL COURSE: 64 y/o male PMHx  chronic Afib s/p Watchman's device (4/19), HTN, prior CVAs, ETOH abuse, smoker, COPD, HFpEF, Cirrhosis, s/p L BKA admitted on 4/18 with acute COPD exacerbation. Hospital course complicated by ETOH withdrawal, delirium tremens. Pt was RRT due AMS and respiratory distress. CT head negative for any acute pathology. ABG with acute on chronic hypercapnic respiratory failure. Patient was intubated and then extubated on 4/26. Course complicated by AFib w/ RVR on Cardizem infusion, and ongoing encephalopathy. Intubated urgently 4/29 AM for worsening mental status, obtundation, inability to protect airway.      Events last 24 hours: Remains intubated on full vent support, failing weaning trials for tachypnea, started on Precedex      PAST MEDICAL & SURGICAL HISTORY:  Chronic CHF  COPD without exacerbation  Atrial fibrillation  Presence of Watchman left atrial appendage closure device  Hypertension  GERD (gastroesophageal reflux disease)  Chronic obstructive pulmonary disease (COPD)  Anemia  Falls  Meningitis  Collapsed lung  Alcohol withdrawal  Emphysema of lung  Cirrhosis  CHF (congestive heart failure)  Poor historian  Alcohol abuse  Chronic atrial fibrillation  Unilateral amputation of lower extremity below knee  Presence of Watchman left atrial appendage closure device  S/P BKA (below knee amputation) unilateral, left        SOCIAL HISTORY: UATO due to AMS      Review of Systems: Due to altered mental status/intubation, subjective information was not able to be obtained from the patient. History was obtained, to the extent possible, from review of the chart and collateral sources of information.      Medications:  diltiazem    Tablet 30 milliGRAM(s) Oral every 6 hours PRN  midodrine 10 milliGRAM(s) Oral every 8 hours  ALBUTerol    90 MICROgram(s) HFA Inhaler 2 Puff(s) Inhalation every 4 hours  budesonide 160 MICROgram(s)/formoterol 4.5 MICROgram(s) Inhaler 2 Puff(s) Inhalation two times a day  tiotropium 18 MICROgram(s) Capsule 1 Capsule(s) Inhalation daily  acetaminophen   Tablet .. 650 milliGRAM(s) Oral every 6 hours PRN  dexMEDEtomidine Infusion 0.4 MICROgram(s)/kG/Hr IV Continuous <Continuous>  gabapentin 400 milliGRAM(s) Oral three times a day  metoclopramide Injectable 10 milliGRAM(s) IV Push every 8 hours PRN  propofol Infusion 10 MICROgram(s)/kG/Min IV Continuous <Continuous>  aspirin  chewable 81 milliGRAM(s) Oral daily  clopidogrel Tablet 75 milliGRAM(s) Oral daily  enoxaparin Injectable 40 milliGRAM(s) SubCutaneous daily  pantoprazole  Injectable 40 milliGRAM(s) IV Push daily  polyethylene glycol 3350 17 Gram(s) Oral daily PRN  senna 2 Tablet(s) Oral at bedtime  folic acid 1 milliGRAM(s) Oral daily  multivitamin/minerals/iron Oral Solution (CENTRUM) 15 milliLiter(s) Oral daily  thiamine 100 milliGRAM(s) Oral daily  chlorhexidine 0.12% Liquid 15 milliLiter(s) Oral Mucosa every 12 hours        Mode: AC/ CMV (Assist Control/ Continuous Mandatory Ventilation)  RR (machine): 12  TV (machine): 550  FiO2: 30  PEEP: 8  ITime: 1  MAP: 12  PIP: 29      ICU Vital Signs Last 24 Hrs  T(C): 36.9 (11 May 2020 09:00), Max: 37.1 (11 May 2020 05:00)  T(F): 98.4 (11 May 2020 09:00), Max: 98.7 (11 May 2020 05:00)  HR: 63 (11 May 2020 10:00) (63 - 126)  BP: 147/73 (11 May 2020 10:00) (113/64 - 157/92)  BP(mean): 87 (11 May 2020 10:00) (71 - 108)  ABP: --  ABP(mean): --  RR: 17 (11 May 2020 10:00) (12 - 42)  SpO2: 100% (11 May 2020 10:00) (82% - 100%)          I&O's Detail    10 May 2020 07:01  -  11 May 2020 07:00  --------------------------------------------------------  IN:    Enteral Tube Flush: 600 mL    Free Water: 120 mL    ns in tub fed  [id number]: 1306 mL    propofol Infusion: 674 mL  Total IN: 2700 mL    OUT:    Incontinent per Condom Catheter: 1200 mL  Total OUT: 1200 mL    Total NET: 1500 mL            LABS:                        10.8   4.11  )-----------( 235      ( 11 May 2020 06:53 )             33.3     05-11    139  |  102  |  15  ----------------------------<  108<H>  3.7   |  33<H>  |  0.54    Ca    8.5      11 May 2020 06:53  Phos  4.0     05-11  Mg     2.1     05-11    TPro  6.0  /  Alb  1.6<L>  /  TBili  0.8  /  DBili  x   /  AST  40<H>  /  ALT  58  /  AlkPhos  177<H>  05-11          CAPILLARY BLOOD GLUCOSE      POCT Blood Glucose.: 122 mg/dL (11 May 2020 05:53)        CULTURES:        Physical Examination:    General: Toxic encephalopathic    HEENT: Pupils equal, reactive to light. Symmetric.    PULM: Diffuse ronchi    CVS: AFib with slow ventricular response    ABD: Softly distended    EXT: Mild generalized anasarca. LLE BKA    SKIN: Warm and well perfused, skin flushed diffusely     NEURO: RASS -5          RADIOLOGY: old images reviewed       INVASIVE LINES:   INDWELLING NOVAK:  VTE PROPHYLAXIS: Lovenox   CAM ICU: +   CODE STATUS: FULL      CRITICAL CARE TIME SPENT: 36 minutes spent performing frequent bedside reassessments and augmenting plan of care to address problems of acute critical illness that pose high probability of life threatening deterioration and/or end organ damage/dysfunction and discussing goals of care with patient's family, non-inclusive of time spent on procedures performed.

## 2020-05-11 NOTE — PROGRESS NOTE ADULT - ASSESSMENT
66 y/o male PMHx  chronic Afib s/p Watchman's device (4/19), HTN, prior CVAs, ETOH abuse, smoker, COPD, HFpEF, Cirrhosis, s/p L BKA admitted on 4/18 with acute COPD exacerbation, with hospital course complicated by ETOH withdrawal, delirium tremens, acute on chronic hypercapnic respiratory failure necessitating emergent intubation, AFib with RVR. Now with severe sepsis secondary to MRSA and CRE Pseudomonas pneumonia.      Neuro: Hospital course prolonged by toxic metabolic encephalopathy, multifactorial secondary to severe sepsis, DTs. Ammonia level normal. Transitioned to Precedex today to assist with SAT, d/c Propofol.    Pulm: Intubated for acute on chronic hypercapnic hypoxemic respiratory failure, has been failing weaning trials due to tachypnea. Augmenting vent to pH >7.2 and paO2 55-80. Has been trached twice in the past 2015, 2017. Will ultimately require trach again if family amenable, will readdress with daughter.    CV: Distributive shock initially resolving, on Midodrine, will wean down to 5mg TID. Afib RVR in the setting of sepsis, now rate controlled on Cardizem. Supplementing lytes to K >4, Mg >2 for optimal arrhythmia suppression.     GI: Tube Feeds via NGT in lieu of IV fluids. PPI GI ppx.    Renal: Monitoring renal indices, ATN resolved. Free water for hyperchloremic hypernatremia, improved.     ID: Severe sepsis MRSA and CRE Pseudomonas PNA, completed course of Vancomycin and Cipro, observing off abx. No leukocytosis, afebrile.     Heme: Lovenox for DVT ppx. Continue Aspirin and Plavix

## 2020-05-11 NOTE — PROGRESS NOTE ADULT - ASSESSMENT
PROBLEMS;    Ac on chronic hypercapnic & hypoxamic respiratory failure-s/p extubation-worsening ventilation  sputum-Few Pseudomonas aeruginosa (Carbapenem Resistant)/Moderate Methicillin resistant Staphylococcus aureus  afib with RVR  OHS/VIJAY  AC flare of COPD/chronic vs acute on chronic bronchitis  Mild interstitial lung disease-NSIP h/o smoking  COVID negativex2  Pulmonary hypertension  Nonobstructing stone in the left ureterovesicular junction/ nonobstructing stone in the lower pole right kidney.  Subacute hemorrhage in the right rectus abdominis along the anterior sheath, measures 1.6 cm in thickness and extends from the umbilicus to the inferior myotendinous junction  Cirrhosis  Mild splenomegaly-portal venous hypertension.  Chronic afib w Watchman device  CHF  BPH  GERD  ETOH abuse  seizures disorder    PLAN;    vent support-pat multiple failed weaning attempts- need a trach  iv percedex  off pressors & abx  aerosols  supportive care  covid repeat testing-neg  d/w staff

## 2020-05-12 LAB
ANION GAP SERPL CALC-SCNC: 6 MMOL/L — SIGNIFICANT CHANGE UP (ref 5–17)
BUN SERPL-MCNC: 16 MG/DL — SIGNIFICANT CHANGE UP (ref 7–23)
CALCIUM SERPL-MCNC: 9 MG/DL — SIGNIFICANT CHANGE UP (ref 8.5–10.1)
CHLORIDE SERPL-SCNC: 107 MMOL/L — SIGNIFICANT CHANGE UP (ref 96–108)
CO2 SERPL-SCNC: 29 MMOL/L — SIGNIFICANT CHANGE UP (ref 22–31)
CREAT SERPL-MCNC: 0.59 MG/DL — SIGNIFICANT CHANGE UP (ref 0.5–1.3)
GLUCOSE SERPL-MCNC: 141 MG/DL — HIGH (ref 70–99)
HCT VFR BLD CALC: 34.9 % — LOW (ref 39–50)
HGB BLD-MCNC: 11.6 G/DL — LOW (ref 13–17)
MAGNESIUM SERPL-MCNC: 1.9 MG/DL — SIGNIFICANT CHANGE UP (ref 1.6–2.6)
MCHC RBC-ENTMCNC: 32.6 PG — SIGNIFICANT CHANGE UP (ref 27–34)
MCHC RBC-ENTMCNC: 33.2 GM/DL — SIGNIFICANT CHANGE UP (ref 32–36)
MCV RBC AUTO: 98 FL — SIGNIFICANT CHANGE UP (ref 80–100)
PHOSPHATE SERPL-MCNC: 3.2 MG/DL — SIGNIFICANT CHANGE UP (ref 2.5–4.5)
PLATELET # BLD AUTO: 242 K/UL — SIGNIFICANT CHANGE UP (ref 150–400)
POTASSIUM SERPL-MCNC: 4 MMOL/L — SIGNIFICANT CHANGE UP (ref 3.5–5.3)
POTASSIUM SERPL-SCNC: 4 MMOL/L — SIGNIFICANT CHANGE UP (ref 3.5–5.3)
RBC # BLD: 3.56 M/UL — LOW (ref 4.2–5.8)
RBC # FLD: 12.9 % — SIGNIFICANT CHANGE UP (ref 10.3–14.5)
SODIUM SERPL-SCNC: 142 MMOL/L — SIGNIFICANT CHANGE UP (ref 135–145)
WBC # BLD: 4.65 K/UL — SIGNIFICANT CHANGE UP (ref 3.8–10.5)
WBC # FLD AUTO: 4.65 K/UL — SIGNIFICANT CHANGE UP (ref 3.8–10.5)

## 2020-05-12 PROCEDURE — 99231 SBSQ HOSP IP/OBS SF/LOW 25: CPT

## 2020-05-12 PROCEDURE — 71045 X-RAY EXAM CHEST 1 VIEW: CPT | Mod: 26

## 2020-05-12 PROCEDURE — 74018 RADEX ABDOMEN 1 VIEW: CPT | Mod: 26

## 2020-05-12 RX ADMIN — GABAPENTIN 400 MILLIGRAM(S): 400 CAPSULE ORAL at 21:36

## 2020-05-12 RX ADMIN — DEXMEDETOMIDINE HYDROCHLORIDE IN 0.9% SODIUM CHLORIDE 11.8 MICROGRAM(S)/KG/HR: 4 INJECTION INTRAVENOUS at 00:44

## 2020-05-12 RX ADMIN — GABAPENTIN 400 MILLIGRAM(S): 400 CAPSULE ORAL at 13:59

## 2020-05-12 RX ADMIN — DEXMEDETOMIDINE HYDROCHLORIDE IN 0.9% SODIUM CHLORIDE 11.8 MICROGRAM(S)/KG/HR: 4 INJECTION INTRAVENOUS at 13:59

## 2020-05-12 RX ADMIN — CLOPIDOGREL BISULFATE 75 MILLIGRAM(S): 75 TABLET, FILM COATED ORAL at 12:12

## 2020-05-12 RX ADMIN — DEXMEDETOMIDINE HYDROCHLORIDE IN 0.9% SODIUM CHLORIDE 11.8 MICROGRAM(S)/KG/HR: 4 INJECTION INTRAVENOUS at 23:07

## 2020-05-12 RX ADMIN — DEXMEDETOMIDINE HYDROCHLORIDE IN 0.9% SODIUM CHLORIDE 11.8 MICROGRAM(S)/KG/HR: 4 INJECTION INTRAVENOUS at 15:42

## 2020-05-12 RX ADMIN — Medication 81 MILLIGRAM(S): at 12:12

## 2020-05-12 RX ADMIN — ENOXAPARIN SODIUM 40 MILLIGRAM(S): 100 INJECTION SUBCUTANEOUS at 12:13

## 2020-05-12 RX ADMIN — DEXMEDETOMIDINE HYDROCHLORIDE IN 0.9% SODIUM CHLORIDE 11.8 MICROGRAM(S)/KG/HR: 4 INJECTION INTRAVENOUS at 19:36

## 2020-05-12 RX ADMIN — DEXMEDETOMIDINE HYDROCHLORIDE IN 0.9% SODIUM CHLORIDE 11.8 MICROGRAM(S)/KG/HR: 4 INJECTION INTRAVENOUS at 22:06

## 2020-05-12 RX ADMIN — DEXMEDETOMIDINE HYDROCHLORIDE IN 0.9% SODIUM CHLORIDE 11.8 MICROGRAM(S)/KG/HR: 4 INJECTION INTRAVENOUS at 04:38

## 2020-05-12 RX ADMIN — MIDODRINE HYDROCHLORIDE 5 MILLIGRAM(S): 2.5 TABLET ORAL at 21:36

## 2020-05-12 RX ADMIN — CHLORHEXIDINE GLUCONATE 15 MILLILITER(S): 213 SOLUTION TOPICAL at 17:42

## 2020-05-12 RX ADMIN — Medication 650 MILLIGRAM(S): at 21:36

## 2020-05-12 RX ADMIN — CHLORHEXIDINE GLUCONATE 15 MILLILITER(S): 213 SOLUTION TOPICAL at 05:18

## 2020-05-12 RX ADMIN — DEXMEDETOMIDINE HYDROCHLORIDE IN 0.9% SODIUM CHLORIDE 11.8 MICROGRAM(S)/KG/HR: 4 INJECTION INTRAVENOUS at 08:05

## 2020-05-12 RX ADMIN — SENNA PLUS 2 TABLET(S): 8.6 TABLET ORAL at 21:51

## 2020-05-12 RX ADMIN — Medication 1 MILLIGRAM(S): at 20:29

## 2020-05-12 RX ADMIN — GABAPENTIN 400 MILLIGRAM(S): 400 CAPSULE ORAL at 05:18

## 2020-05-12 RX ADMIN — DEXMEDETOMIDINE HYDROCHLORIDE IN 0.9% SODIUM CHLORIDE 11.8 MICROGRAM(S)/KG/HR: 4 INJECTION INTRAVENOUS at 02:06

## 2020-05-12 RX ADMIN — FENTANYL CITRATE 50 MICROGRAM(S): 50 INJECTION INTRAVENOUS at 00:59

## 2020-05-12 RX ADMIN — Medication 1 MILLIGRAM(S): at 12:11

## 2020-05-12 RX ADMIN — DEXMEDETOMIDINE HYDROCHLORIDE IN 0.9% SODIUM CHLORIDE 11.8 MICROGRAM(S)/KG/HR: 4 INJECTION INTRAVENOUS at 20:57

## 2020-05-12 RX ADMIN — DEXMEDETOMIDINE HYDROCHLORIDE IN 0.9% SODIUM CHLORIDE 11.8 MICROGRAM(S)/KG/HR: 4 INJECTION INTRAVENOUS at 12:23

## 2020-05-12 RX ADMIN — Medication 100 MILLIGRAM(S): at 12:12

## 2020-05-12 RX ADMIN — FENTANYL CITRATE 50 MICROGRAM(S): 50 INJECTION INTRAVENOUS at 03:43

## 2020-05-12 RX ADMIN — Medication 1 MILLIGRAM(S): at 16:30

## 2020-05-12 RX ADMIN — Medication 1 MILLIGRAM(S): at 12:12

## 2020-05-12 RX ADMIN — PANTOPRAZOLE SODIUM 40 MILLIGRAM(S): 20 TABLET, DELAYED RELEASE ORAL at 12:15

## 2020-05-12 RX ADMIN — MIDODRINE HYDROCHLORIDE 5 MILLIGRAM(S): 2.5 TABLET ORAL at 13:59

## 2020-05-12 RX ADMIN — MIDODRINE HYDROCHLORIDE 5 MILLIGRAM(S): 2.5 TABLET ORAL at 05:18

## 2020-05-12 RX ADMIN — Medication 15 MILLILITER(S): at 13:59

## 2020-05-12 NOTE — PROGRESS NOTE ADULT - SUBJECTIVE AND OBJECTIVE BOX
Subjective:    pat was on PS overnight, now on AC as chewing the tube, on percedex.     Home Medications:  albuterol 2.5 mg/3 mL (0.083%) inhalation solution: 3 milliliter(s) inhaled every 6 hours, As Needed -for shortness of breath and/or wheezing (19 Apr 2020 03:12)  gabapentin 400 mg oral capsule: 1 cap(s) orally 3 times a day (19 Apr 2020 03:12)  pantoprazole 40 mg oral granule, enteric coated: 40 milligram(s) orally once a day (19 Apr 2020 03:12)  Spiriva 18 mcg inhalation capsule: 1 cap(s) inhaled once a day (19 Apr 2020 03:12)  tamsulosin 0.4 mg oral capsule: 1 cap(s) orally once a day (at bedtime) (19 Apr 2020 03:12)  traZODone 50 mg oral tablet: 2 tab(s) orally once a day (at bedtime), As Needed (19 Apr 2020 03:12)    MEDICATIONS  (STANDING):  ALBUTerol    90 MICROgram(s) HFA Inhaler 2 Puff(s) Inhalation every 4 hours  aspirin  chewable 81 milliGRAM(s) Oral daily  budesonide 160 MICROgram(s)/formoterol 4.5 MICROgram(s) Inhaler 2 Puff(s) Inhalation two times a day  chlorhexidine 0.12% Liquid 15 milliLiter(s) Oral Mucosa every 12 hours  clopidogrel Tablet 75 milliGRAM(s) Oral daily  dexMEDEtomidine Infusion 0.4 MICROgram(s)/kG/Hr (11.8 mL/Hr) IV Continuous <Continuous>  enoxaparin Injectable 40 milliGRAM(s) SubCutaneous daily  folic acid 1 milliGRAM(s) Oral daily  gabapentin 400 milliGRAM(s) Oral three times a day  midodrine 5 milliGRAM(s) Oral every 8 hours  multivitamin/minerals/iron Oral Solution (CENTRUM) 15 milliLiter(s) Oral daily  pantoprazole  Injectable 40 milliGRAM(s) IV Push daily  senna 2 Tablet(s) Oral at bedtime  thiamine 100 milliGRAM(s) Oral daily  tiotropium 18 MICROgram(s) Capsule 1 Capsule(s) Inhalation daily    MEDICATIONS  (PRN):  acetaminophen   Tablet .. 650 milliGRAM(s) Oral every 6 hours PRN Temp greater or equal to 38.5C (101.3F)  diltiazem    Tablet 30 milliGRAM(s) Oral every 6 hours PRN HR > 110  fentaNYL    Injectable 50 MICROGram(s) IV Push every 2 hours PRN Moderate Pain (4 - 6) or vent compliance  metoclopramide Injectable 10 milliGRAM(s) IV Push every 8 hours PRN Vomiting  polyethylene glycol 3350 17 Gram(s) Oral daily PRN Constipation      Allergies    Ceclor (Rash)  Duricef (Rash)    Intolerances        Vital Signs Last 24 Hrs  T(C): 37.3 (12 May 2020 10:20), Max: 37.6 (12 May 2020 00:50)  T(F): 99.1 (12 May 2020 10:20), Max: 99.7 (12 May 2020 00:50)  HR: 74 (12 May 2020 11:51) (59 - 81)  BP: 125/63 (12 May 2020 10:00) (102/56 - 157/89)  BP(mean): 78 (12 May 2020 10:00) (67 - 103)  RR: 13 (12 May 2020 10:20) (13 - 32)  SpO2: 98% (12 May 2020 11:51) (93% - 99%)  Mode: AC/ CMV (Assist Control/ Continuous Mandatory Ventilation)  RR (machine): 12  TV (machine): 550  FiO2: 30  PEEP: 8  MAP: 13  PIP: 33      PHYSICAL EXAMINATION:    NECK:  Supple. No lymphadenopathy. Jugular venous pressure not elevated. Carotids equal.   HEART:   The cardiac impulse has a normal quality. Reg., Nl S1 and S2.  There are no murmurs, rubs or gallops noted  CHEST:  Chest few rhonchi to auscultation. Normal respiratory effort.  ABDOMEN:  Soft and nontender.   EXTREMITIES:  There is no edema.       LABS:                        11.6   4.65  )-----------( 242      ( 12 May 2020 06:58 )             34.9     05-12    142  |  107  |  16  ----------------------------<  141<H>  4.0   |  29  |  0.59    Ca    9.0      12 May 2020 06:58  Phos  3.2     05-12  Mg     1.9     05-12    TPro  6.0  /  Alb  1.6<L>  /  TBili  0.8  /  DBili  x   /  AST  40<H>  /  ALT  58  /  AlkPhos  177<H>  05-11

## 2020-05-12 NOTE — CHART NOTE - NSCHARTNOTEFT_GEN_A_CORE
HPI:  64 y/o male PMHx  chronic Afib s/p Watchman's device (4/19), HTN, prior CVAs, ETOH abuse, smoker, COPD, HFpEF, Cirrhosis, s/p L BKA admitted on 4/18 with acute COPD exacerbation. Hospital course complicated by ETOH withdrawal, delirium tremens. Pt was RRT due AMS and respiratory distress. CT head negative for any acute pathology. ABG with acute on chronic hypercapnic respiratory failure. Patient was intubated and then extubated on 4/26. Intubated urgently 4/29 AM for worsening mental status, obtundation, inability to protect airway.    Current Status:   Pt pulled out CORPAK this morning will replace, however pt needs PEG and Trach pending family discussion. Pt has been failing weaning trials due to tachypnea.     *I & O's- Urine output (incontinent condom cath/voided) 1150ml. Last BM documented on 5/6- x 6 days (current bowel regimen: senna daily, Miralax PRN) may need to increase bowel meds.   Flowsheet: Jevity 1.5 682ml (receiving 76% of ordered enteral feeds), free water flush 175ml ?additional continuos water flush 60cc/hr (total 1200ml not documented). Ordered 300ml additional bolus water flushes Q4 hours (total volume 1800ml) Free water/additional bolus flushes, and free water from enteral feeds (total daily-3684ml) consider decreasing additional bolus flushes as if all fluids given will over exceed hydration needs.     Weights: 5/9-258.1#, 4/.9# indicating significant weight loss x 1 month ~5%/ 13#.    Skin intact with +3 edema documented. Jitendra score= 10 ( high risk of skin breakdown). MVI w/ minerals, folic acid, thiamine provided as ordered.     *Pending placement of CORPAK- restart enteral feeds as ordered.     Recommendations:  1) Restart enteral feeds when COPAK placed. Jevity 1.5 45cc/hr (total volume 1000ml) + 6 packs of prosource TF daily. (total provided including calories from propofol- 2150 calories/ 124gm protein/ 684ml free water from enteral formula)  2) Additional continuous water flush 60cc/hr (total volume 1200ml daily). Total daily water volume including free water from enteral formula= 1884ml daily.   3) Maintain aspiration precautions, back of bed >35 degrees.   4) monitor daily weights  5) monitor labs/lytes and replete as needed  6) consider PEG placement for long term nutrition support when medically feasible  7) monitor additional flushes and decrease to meet hydration needs  8) additional bowel meds due to constipation  x 6 days.       Diet Presciption: Diet, NPO with Tube Feed:   Tube Feeding Modality: Nasogastric  Jevity 1.5 Randall (JEVITY1.5)  Total Volume for 24 Hours (mL): 1080  Continuous  Starting Tube Feed Rate {mL per Hour}: 45  Increase Tube Feed Rate by (mL): 0     Every hour  Until Goal Tube Feed Rate (mL per Hour): 45  Tube Feed Duration (in Hours): 24  Tube Feed Start Time: 00:00  No Carb Prosource TF     Qty per Day:  6 (05-11-20 @ 11:06)      Wt Hx: 5/9-258.1#  Height (cm): 182.88 (04-18-20 @ 19:32)  Weight (kg): 117.9 (04-18-20 @ 19:32)  BMI (kg/m2): 35.3 (04-18-20 @ 19:32)      Weight Used for Calculation: Adjusted body weight 87.5Kg IBW used for protein needs 77.5Kg     Estimated Energy Needs (20-25 calories/kg): 6888-4246 calories  Estimated Protein Needs ( 1.6-1.8 gm/kg): 124-140gm protein  Estimated Fluid Needs (25-30 ml/kg): 2187- 2625 ml daily      Labs: 05-12 Na142 mmol/L Glu 141 mg/dL<H> K+ 4.0 mmol/L Cr  0.59 mg/dL BUN 16 mg/dL 05-12 Phos 3.2 mg/dL 05-11 Alb 1.6 g/dL<L> 05-10 Chol --    LDL --    HDL --    Trig 106 mg/dL      ***Will continue to monitor and follow up prn***

## 2020-05-12 NOTE — CONSULT NOTE ADULT - REASON FOR ADMISSION
acute hypoxemic, hypercapneic resp failure  COPD exacerbation

## 2020-05-12 NOTE — CONSULT NOTE ADULT - ASSESSMENT
64 y/o M with history of hypercapnic respiratory failure Trach/PEG in 2015 and 2017,  now intubated from hypercapnic respiratory failure , COVID neg consulted for Trach/PEG    Plan   Recommend stop plavix and ASA for Trach   Plan for Trach on Friday in OR  cont medical mgmt as per CCU  DW Dr Morris

## 2020-05-12 NOTE — PROGRESS NOTE ADULT - ASSESSMENT
66 y/o male PMHx  chronic Afib s/p Watchman's device (4/19), HTN, prior CVAs, ETOH abuse, smoker, COPD, HFpEF, Cirrhosis, s/p L BKA admitted on 4/18 with acute COPD exacerbation, with hospital course complicated by ETOH withdrawal, delirium tremens, acute on chronic hypercapnic respiratory failure necessitating emergent intubation, AFib with RVR. Now with severe sepsis secondary to MRSA and CRE Pseudomonas pneumonia.      Neuro: Hospital course prolonged by toxic metabolic encephalopathy, multifactorial secondary to severe sepsis, DTs. Ammonia level normal. On Precedex for comfort, however has periods of agitation, given low dose Ativan PRN (very sensitive to benzodiazepines)     Pulm: Intubated for acute on chronic hypercapnic hypoxemic respiratory failure, has been failing weaning trials due to tachypnea. Augmenting vent to pH >7.2 and paO2 55-80. Has been trached twice in the past 2015, 2017. Failed SBT again today. Will ultimately require trach again, discussed with daughter Litzy- amenable to proceeding with trach. Will consult Thoracic.    CV: Distributive shock initially resolving, on Midodrine, will wean down to 5mg TID. Afib RVR in the setting of sepsis, now rate controlled on Cardizem. Supplementing lytes to K >4, Mg >2 for optimal arrhythmia Continue Aspirin and Plavix    GI: NGT replaced this morning. Tube Feeds via NGT in lieu of IV fluids. PPI GI ppx.    Renal: Monitoring renal indices, ATN resolved. Free water for hyperchloremic hypernatremia, improved.     ID: Severe sepsis MRSA and CRE Pseudomonas PNA, completed course of Vancomycin and Cipro, observing off abx. No leukocytosis, afebrile.     Heme: Lovenox for DVT ppx.

## 2020-05-12 NOTE — PROGRESS NOTE ADULT - SUBJECTIVE AND OBJECTIVE BOX
Patient is a 65y old  Male who presents with a chief complaint of acute hypoxemic, hypercapneic resp failure  COPD exacerbation (11 May 2020 14:03)      BRIEF HOSPITAL COURSE: 66 y/o male PMHx  chronic Afib s/p Watchman's device (4/19), HTN, prior CVAs, ETOH abuse, smoker, COPD, HFpEF, Cirrhosis, s/p L BKA admitted on 4/18 with acute COPD exacerbation. Hospital course complicated by ETOH withdrawal, delirium tremens. Pt was RRT due AMS and respiratory distress. CT head negative for any acute pathology. ABG with acute on chronic hypercapnic respiratory failure. Patient was intubated and then extubated on 4/26. Course complicated by AFib w/ RVR on Cardizem infusion, and ongoing encephalopathy. Intubated urgently 4/29 AM for worsening mental status, obtundation, inability to protect airway.      Events last 24 hours: Tolerated CPAP 12/8 yesterday and overnight, this AM was tachypneic with RR 40's in distress, placed back on full vent support.       PAST MEDICAL & SURGICAL HISTORY:  Chronic CHF  COPD without exacerbation  Atrial fibrillation  Presence of Watchman left atrial appendage closure device  Hypertension  GERD (gastroesophageal reflux disease)  Chronic obstructive pulmonary disease (COPD)  Anemia  Falls  Meningitis  Collapsed lung  Alcohol withdrawal  Emphysema of lung  Cirrhosis  CHF (congestive heart failure)  Poor historian  Alcohol abuse  Chronic atrial fibrillation  Unilateral amputation of lower extremity below knee  Presence of Watchman left atrial appendage closure device  S/P BKA (below knee amputation) unilateral, left        SOCIAL HISTORY:      Review of Systems:  CONSTITUTIONAL: No fever, chills, or fatigue  EYES: No visual disturbances  ENMT:  No difficulty hearing  NECK: No pain  RESPIRATORY: No cough. No shortness of breath  CARDIOVASCULAR: No chest pain, palpitations, or leg swelling  GASTROINTESTINAL: No abdominal pain. No nausea, vomiting, diarrhea, or constipation. No hematemesis, melena or hematochezia  GENITOURINARY: No dysuria, frequency, hematuria, or incontinence  NEUROLOGICAL: No headaches, loss of strength, numbness, or tremors  SKIN: No rashes  MUSCULOSKELETAL: No back or extremity pain. No calf pain  PSYCHIATRIC: No depression, anxiety, or difficulty sleeping    [  ] Due to altered mental status/intubation, subjective information was not able to be obtained from the patient. History was obtained, to the extent possible, from review of the chart and collateral sources of information.      Medications:    diltiazem    Tablet 30 milliGRAM(s) Oral every 6 hours PRN  midodrine 5 milliGRAM(s) Oral every 8 hours    ALBUTerol    90 MICROgram(s) HFA Inhaler 2 Puff(s) Inhalation every 4 hours  budesonide 160 MICROgram(s)/formoterol 4.5 MICROgram(s) Inhaler 2 Puff(s) Inhalation two times a day  tiotropium 18 MICROgram(s) Capsule 1 Capsule(s) Inhalation daily    acetaminophen   Tablet .. 650 milliGRAM(s) Oral every 6 hours PRN  dexMEDEtomidine Infusion 0.4 MICROgram(s)/kG/Hr IV Continuous <Continuous>  fentaNYL    Injectable 50 MICROGram(s) IV Push every 2 hours PRN  gabapentin 400 milliGRAM(s) Oral three times a day  LORazepam   Injectable 1 milliGRAM(s) IV Push once  metoclopramide Injectable 10 milliGRAM(s) IV Push every 8 hours PRN      aspirin  chewable 81 milliGRAM(s) Oral daily  clopidogrel Tablet 75 milliGRAM(s) Oral daily  enoxaparin Injectable 40 milliGRAM(s) SubCutaneous daily    pantoprazole  Injectable 40 milliGRAM(s) IV Push daily  polyethylene glycol 3350 17 Gram(s) Oral daily PRN  senna 2 Tablet(s) Oral at bedtime        folic acid 1 milliGRAM(s) Oral daily  multivitamin/minerals/iron Oral Solution (CENTRUM) 15 milliLiter(s) Oral daily  thiamine 100 milliGRAM(s) Oral daily      chlorhexidine 0.12% Liquid 15 milliLiter(s) Oral Mucosa every 12 hours        Mode: AC/ CMV (Assist Control/ Continuous Mandatory Ventilation)  RR (machine): 12  TV (machine): 550  FiO2: 30  PEEP: 8  MAP: 13  PIP: 33      ICU Vital Signs Last 24 Hrs  T(C): 37.3 (12 May 2020 10:20), Max: 37.6 (12 May 2020 00:50)  T(F): 99.1 (12 May 2020 10:20), Max: 99.7 (12 May 2020 00:50)  HR: 74 (12 May 2020 11:51) (59 - 81)  BP: 125/63 (12 May 2020 10:00) (102/56 - 157/89)  BP(mean): 78 (12 May 2020 10:00) (67 - 103)  ABP: --  ABP(mean): --  RR: 13 (12 May 2020 10:20) (13 - 32)  SpO2: 98% (12 May 2020 11:51) (93% - 99%)          I&O's Detail    11 May 2020 07:01  -  12 May 2020 07:00  --------------------------------------------------------  IN:    dexmedetomidine Infusion: 830 mL    Enteral Tube Flush: 175 mL    ns in tub fed  [id number]: 682 mL  Total IN: 1687 mL    OUT:    Incontinent per Condom Catheter: 450 mL    Voided: 700 mL  Total OUT: 1150 mL    Total NET: 537 mL            LABS:                        11.6   4.65  )-----------( 242      ( 12 May 2020 06:58 )             34.9     05-12    142  |  107  |  16  ----------------------------<  141<H>  4.0   |  29  |  0.59    Ca    9.0      12 May 2020 06:58  Phos  3.2     05-12  Mg     1.9     05-12    TPro  6.0  /  Alb  1.6<L>  /  TBili  0.8  /  DBili  x   /  AST  40<H>  /  ALT  58  /  AlkPhos  177<H>  05-11          CAPILLARY BLOOD GLUCOSE      POCT Blood Glucose.: 122 mg/dL (11 May 2020 05:53)        CULTURES:        Physical Examination:    General: No acute distress.      HEENT: Pupils equal, reactive to light.  Symmetric.    PULM: Clear to auscultation bilaterally, no significant sputum production    CVS: Regular rate and rhythm, no murmurs, rubs, or gallops    ABD: Soft, nondistended, nontender, normoactive bowel sounds, no masses    EXT: No edema, nontender    SKIN: Warm and well perfused, no rashes noted.    NEURO: Alert, oriented, interactive, nonfocal        RADIOLOGY: ***    INVASIVE LINES:  INDWELLING NOVAK:  VTE PROPHYLAXIS:  CAM ICU:  CODE STATUS:    CRITICAL CARE TIME SPENT: *** minutes spent performing frequent bedside reassessments and augmenting plan of care to address problems of acute critical illness that pose high probability of life threatening deterioration and/or end organ damage/dysfunction and discussing goals of care with patient/family, non-inclusive of time spent on procedures performed. Patient is a 65y old  Male who presents with a chief complaint of acute hypoxemic, hypercapneic resp failure  COPD exacerbation (11 May 2020 14:03)      BRIEF HOSPITAL COURSE: 66 y/o male PMHx  chronic Afib s/p Watchman's device (4/19), HTN, prior CVAs, ETOH abuse, smoker, COPD, HFpEF, Cirrhosis, s/p L BKA admitted on 4/18 with acute COPD exacerbation. Hospital course complicated by ETOH withdrawal, delirium tremens. Pt was RRT due AMS and respiratory distress. CT head negative for any acute pathology. ABG with acute on chronic hypercapnic respiratory failure. Patient was intubated and then extubated on 4/26. Course complicated by AFib w/ RVR on Cardizem infusion, and ongoing encephalopathy. Intubated urgently 4/29 AM for worsening mental status, obtundation, inability to protect airway.      Events last 24 hours: Tolerated CPAP 12/8 yesterday and overnight, this AM was tachypneic with RR 40's in distress, placed back on full vent support.       PAST MEDICAL & SURGICAL HISTORY:  Chronic CHF  COPD without exacerbation  Atrial fibrillation  Presence of Watchman left atrial appendage closure device  Hypertension  GERD (gastroesophageal reflux disease)  Chronic obstructive pulmonary disease (COPD)  Anemia  Falls  Meningitis  Collapsed lung  Alcohol withdrawal  Emphysema of lung  Cirrhosis  CHF (congestive heart failure)  Poor historian  Alcohol abuse  Chronic atrial fibrillation  Unilateral amputation of lower extremity below knee  Presence of Watchman left atrial appendage closure device  S/P BKA (below knee amputation) unilateral, left        SOCIAL HISTORY: UATO due to AMS      Review of Systems: Due to altered mental status/intubation, subjective information was not able to be obtained from the patient. History was obtained, to the extent possible, from review of the chart and collateral sources of information.      Medications:  diltiazem    Tablet 30 milliGRAM(s) Oral every 6 hours PRN  midodrine 5 milliGRAM(s) Oral every 8 hours  ALBUTerol    90 MICROgram(s) HFA Inhaler 2 Puff(s) Inhalation every 4 hours  budesonide 160 MICROgram(s)/formoterol 4.5 MICROgram(s) Inhaler 2 Puff(s) Inhalation two times a day  tiotropium 18 MICROgram(s) Capsule 1 Capsule(s) Inhalation daily  acetaminophen   Tablet .. 650 milliGRAM(s) Oral every 6 hours PRN  dexMEDEtomidine Infusion 0.4 MICROgram(s)/kG/Hr IV Continuous <Continuous>  fentaNYL    Injectable 50 MICROGram(s) IV Push every 2 hours PRN  gabapentin 400 milliGRAM(s) Oral three times a day  LORazepam   Injectable 1 milliGRAM(s) IV Push once  metoclopramide Injectable 10 milliGRAM(s) IV Push every 8 hours PRN  aspirin  chewable 81 milliGRAM(s) Oral daily  clopidogrel Tablet 75 milliGRAM(s) Oral daily  enoxaparin Injectable 40 milliGRAM(s) SubCutaneous daily  pantoprazole  Injectable 40 milliGRAM(s) IV Push daily  polyethylene glycol 3350 17 Gram(s) Oral daily PRN  senna 2 Tablet(s) Oral at bedtime  folic acid 1 milliGRAM(s) Oral daily  multivitamin/minerals/iron Oral Solution (CENTRUM) 15 milliLiter(s) Oral daily  thiamine 100 milliGRAM(s) Oral daily  chlorhexidine 0.12% Liquid 15 milliLiter(s) Oral Mucosa every 12 hours        Mode: AC/ CMV (Assist Control/ Continuous Mandatory Ventilation)  RR (machine): 12  TV (machine): 550  FiO2: 30  PEEP: 8  MAP: 13  PIP: 33      ICU Vital Signs Last 24 Hrs  T(C): 37.3 (12 May 2020 10:20), Max: 37.6 (12 May 2020 00:50)  T(F): 99.1 (12 May 2020 10:20), Max: 99.7 (12 May 2020 00:50)  HR: 74 (12 May 2020 11:51) (59 - 81)  BP: 125/63 (12 May 2020 10:00) (102/56 - 157/89)  BP(mean): 78 (12 May 2020 10:00) (67 - 103)  ABP: --  ABP(mean): --  RR: 13 (12 May 2020 10:20) (13 - 32)  SpO2: 98% (12 May 2020 11:51) (93% - 99%)          I&O's Detail    11 May 2020 07:01  -  12 May 2020 07:00  --------------------------------------------------------  IN:    dexmedetomidine Infusion: 830 mL    Enteral Tube Flush: 175 mL    ns in tub fed  [id number]: 682 mL  Total IN: 1687 mL    OUT:    Incontinent per Condom Catheter: 450 mL    Voided: 700 mL  Total OUT: 1150 mL    Total NET: 537 mL            LABS:                        11.6   4.65  )-----------( 242      ( 12 May 2020 06:58 )             34.9     05-12    142  |  107  |  16  ----------------------------<  141<H>  4.0   |  29  |  0.59    Ca    9.0      12 May 2020 06:58  Phos  3.2     05-12  Mg     1.9     05-12    TPro  6.0  /  Alb  1.6<L>  /  TBili  0.8  /  DBili  x   /  AST  40<H>  /  ALT  58  /  AlkPhos  177<H>  05-11          CAPILLARY BLOOD GLUCOSE      POCT Blood Glucose.: 122 mg/dL (11 May 2020 05:53)        CULTURES:        Physical Examination:    General: Toxic encephalopathic    HEENT: Pupils equal, reactive to light. Symmetric.    PULM: Diffuse ronchi    CVS: AFib rate controlled    ABD: Softly distended    EXT: Mild generalized anasarca. LLE BKA    SKIN: Warm and well perfused, skin flushed diffusely     NEURO: RASS -2 to +1        RADIOLOGY: < from: Xray Chest 1 View-PORTABLE IMMEDIATE (05.12.20 @ 11:10) >    EXAM:  XR CHEST PORTABLE IMMED 1V                          PROCEDURE DATE:  05/12/2020      INTERPRETATION:  RADIOGRAPH OF THE CHEST    Clinical Indication: Nasogastric tube placement.    Technique: Single frontal radiograph of the chest    Comparison: Prior study from 5/7/2020.   CT of the chest from 4/19/2020.      Findings:  Tubes and lines: The nasogastric tube is malpositioned, coiled at the mid esophagus and extends superiorly; the tib is excluded from the field of view.   The endotracheal tube is again seen with the tip terminating approximately 5.5 cm above the regina.      Lungs and pleura:  There is a small right pleural effusion and adjacent atelectasis/consolidation, persistent but overall appears improved compared to the prior study.  Question mild pulmonary vascular congestion.  There is no pneumothorax.    Heart and mediastinum: The cardiomediastinal silhouette is stably enlarged.  Left atrial device again seen.   The aorta is calcified.      Bones and soft tissue: There are no obvious acute osseous or soft tissue abnormalities.    IMPRESSION:   Malpositioned nasogastric tube.  Recommend repositioning.    Right pleural effusion and adjacent atelectasis/consolidation, persistent but overall appears improved compared to the prior study.       Question mild pulmonary vascular congestion.      Stable cardiomegaly.      JOSÉ LUIS ROCHE M.D., ATTENDING RADIOLOGIST  This document has been electronically signed. May 12 2020 11:31AM        INVASIVE LINES: RUE midline  INDWELLING NOVAK:  X   VTE PROPHYLAXIS: Lovenox   CAM ICU: +  CODE STATUS: FULL      CRITICAL CARE TIME SPENT: 36 minutes spent performing frequent bedside reassessments and augmenting plan of care to address problems of acute critical illness that pose high probability of life threatening deterioration and/or end organ damage/dysfunction and discussing goals of care with patient's family, non-inclusive of time spent on procedures performed.

## 2020-05-12 NOTE — CONSULT NOTE ADULT - SUBJECTIVE AND OBJECTIVE BOX
Surgeon: Arturo    Consult requesting by: Dr Shea     HISTORY OF PRESENT ILLNESS:  History of Present Illness: 	  65 year old male w chronic AFib w Watchman device, CHF, COPD, HTN, obesity came to ED w EMS c/o SOB since the morning of 04-18    per EMS, pt was in rapid afib in 180s upon EMS arrival with O2 sat in 80s. pt placed on NRB via EMS now with O2 sat of 97.   pt given 125 solumedrol and 30 mg cardizem via EMS.  denies fevers, cough, chest pain. States never experienced similar sx before. Pt reports he is supposed to be on home O2 but isn't and supposed to use CPAP machine at night and isn't.    In ED /85  HR 99  RR 25  T 98.6  97% sat NRB 15 L  given solumedrol 125 mg by ED as well as albuterol x 1. magnesium IV  CXR clear; COVID-19 not detected  ABG revealed hypercapnea x 2; trial of BiPAP ( From [facility] & P 4/19)   Pt was intubated, extubated then reintubated on 4/29 and now consulted for Trach/PEG     PAST MEDICAL & SURGICAL HISTORY:  Chronic CHF  COPD without exacerbation  Atrial fibrillation  Presence of Watchman left atrial appendage closure device  Hypertension  GERD (gastroesophageal reflux disease)  Chronic obstructive pulmonary disease (COPD)  Anemia  Falls  Meningitis  Collapsed lung  Alcohol withdrawal  Emphysema of lung  Cirrhosis  CHF (congestive heart failure)  Poor historian  Alcohol abuse  Chronic atrial fibrillation  Unilateral amputation of lower extremity below knee  Presence of Watchman left atrial appendage closure device  S/P BKA (below knee amputation) unilateral, left      MEDICATIONS  (STANDING):  ALBUTerol    90 MICROgram(s) HFA Inhaler 2 Puff(s) Inhalation every 4 hours  aspirin  chewable 81 milliGRAM(s) Oral daily  budesonide 160 MICROgram(s)/formoterol 4.5 MICROgram(s) Inhaler 2 Puff(s) Inhalation two times a day  chlorhexidine 0.12% Liquid 15 milliLiter(s) Oral Mucosa every 12 hours  clopidogrel Tablet 75 milliGRAM(s) Oral daily  dexMEDEtomidine Infusion 0.4 MICROgram(s)/kG/Hr (11.8 mL/Hr) IV Continuous <Continuous>  enoxaparin Injectable 40 milliGRAM(s) SubCutaneous daily  folic acid 1 milliGRAM(s) Oral daily  gabapentin 400 milliGRAM(s) Oral three times a day  midodrine 5 milliGRAM(s) Oral every 8 hours  multivitamin/minerals/iron Oral Solution (CENTRUM) 15 milliLiter(s) Oral daily  pantoprazole  Injectable 40 milliGRAM(s) IV Push daily  senna 2 Tablet(s) Oral at bedtime  thiamine 100 milliGRAM(s) Oral daily  tiotropium 18 MICROgram(s) Capsule 1 Capsule(s) Inhalation daily    MEDICATIONS  (PRN):  acetaminophen   Tablet .. 650 milliGRAM(s) Oral every 6 hours PRN Temp greater or equal to 38.5C (101.3F)  diltiazem    Tablet 30 milliGRAM(s) Oral every 6 hours PRN HR > 110  fentaNYL    Injectable 50 MICROGram(s) IV Push every 2 hours PRN Moderate Pain (4 - 6) or vent compliance  LORazepam   Injectable 1 milliGRAM(s) IV Push every 4 hours PRN Anxiety  metoclopramide Injectable 10 milliGRAM(s) IV Push every 8 hours PRN Vomiting  polyethylene glycol 3350 17 Gram(s) Oral daily PRN Constipation    Antiplatelet therapy:   ASA/ Plavix                         Last dose/amt:    Allergies    Ceclor (Rash)  Duricef (Rash)    Intolerances        SOCIAL HISTORY:  intubated- unable to interview   Smoker: [ ] Yes  [ ] No        PACK YEARS:                         WHEN QUIT?  ETOH use: [ ] Yes  [ ] No              FREQUENCY / QUANTITY:  Ilicit Drug use:  [ ] Yes  [ ] No  Occupation:  Live with:  Assisted device use:    FAMILY HISTORY:  No pertinent family history in first degree relatives  Family history unknown: Adopted      Review of Systems  intubated unable to interview   CONSTITUTIONAL:  Fevers[ ] chills[ ] sweats[ ] fatigue[ ] weight loss[ ] weight gain [ ]                                     NEGATIVE [ ]   NEURO:  parathesias[ ] seizures [ ]  syncope [ ]  confusion [ ]                                                                                NEGATIVE[ ]   EYES: glasses[ ]  blurry vision[ ]  discharge[ ] pain[ ] glaucoma [ ]                                                                          NEGATIVE[ ]   ENMT:  difficulty hearing [ ]  vertigo[ ]  dysphagia[ ] epistaxis[ ] recent dental work [ ]                                    NEGATIVE[ ]   CV:  chest pain[ ] palpitations[ ] ALANIZ [ ] diaphoresis [ ]                                                                                           NEGATIVE[ ]   RESPIRATORY:  wheezing[ ] SOB[ ] cough [ ] sputum[ ] hemoptysis[ ]                                                                  NEGATIVE[ ]   GI:  nausea[ ]  vommiting [ ]  diarrhea[ ] constipation [ ] melena [ ]                                                                      NEGATIVE[ ]   : hematuria[ ]  dysuria[ ] urgency[ ] incontinence[ ]                                                                                            NEGATIVE[ ]   MUSKULOSKELETAL:  arthritis[ ]  joint swelling [ ] muscle weakness [ ]                                                                NEGATIVE[ ]   SKIN/BREAST:  rash[ ] itching [ ]  hair loss[ ] masses[ ]                                                                                              NEGATIVE[ ]   PSYCH:  dementia [ ] depresion [ ] anxiety[ ]                                                                                                               NEGATIVE[ ]   HEME/LYMPH:  bruises easily[ ] enlarged lymph nodes[ ] tender lymph nodes[ ]                                               NEGATIVE[ ]   ENDOCRINE:  cold intolerance[ ] heat intolerance[ ] polydipsia[ ]                                                                          NEGATIVE[ ]     PHYSICAL EXAM  Vital Signs Last 24 Hrs  T(C): 37.3 (12 May 2020 10:20), Max: 37.6 (12 May 2020 00:50)  T(F): 99.1 (12 May 2020 10:20), Max: 99.7 (12 May 2020 00:50)  HR: 74 (12 May 2020 15:00) (59 - 81)  BP: 126/59 (12 May 2020 15:00) (102/56 - 157/89)  BP(mean): 73 (12 May 2020 15:00) (67 - 103)  RR: 17 (12 May 2020 15:00) (12 - 32)  SpO2: 100% (12 May 2020 15:00) (94% - 100%)    CONSTITUTIONAL:                                                                          WNL[ ]  intubated and sedated   Neuro: WNL[ ] Normal exam oriented to person/place & time with no focal motor or sensory  deficits. Other  Sedated                     Eyes: WNL[x ]   Normal exam of conjunctiva & lids, pupils equally reactive. Other     ENT: WNL[x ]    Normal exam of nasal/oral mucosa with absence of cyanosis. Other  Neck: WNL[x ]  Normal exam of jugular veins, trachea & thyroid. Other  Chest: WNL[ ] Normal lung exam with good air movement absence of wheezes, rales, or rhonchi: Other  decreased b/l                                                                               CV:  Auscultation: normal [ ] S3[ ] S4[ ] Irregular [x ] Rub[ ] Clicks[ ]    Murmurs none:[ ]systolic [ ]  diastolic [ ] holosystolic [ ]  Carotids: No Bruits[x ] Other                 Abdominal Aorta: normal [ ] nonpalpable[x ]Other                                                                                      GI:           WNL[ x] Normal exam of abdomen, liver & spleen with no noted masses or tenderness. Other                                                                                                        Extremities: WNL[ ] Normal no evidence of cyanosis or deformity Edema: none[ ]trace[x ]1+[ ]2+[ ]3+[ ]4+[ ]  Lower Extremity Pulses: Right[+2 ] Left[+2 ]Varicosities[ ]  + LE BKA   SKIN :WNL[x ] Normal exam to inspection & palation. Other:                                                          LABS:                        11.6   4.65  )-----------( 242      ( 12 May 2020 06:58 )             34.9     05-12    142  |  107  |  16  ----------------------------<  141<H>  4.0   |  29  |  0.59    Ca    9.0      12 May 2020 06:58  Phos  3.2     05-12  Mg     1.9     05-12    TPro  6.0  /  Alb  1.6<L>  /  TBili  0.8  /  DBili  x   /  AST  40<H>  /  ALT  58  /  AlkPhos  177<H>  05-11

## 2020-05-13 LAB
ALBUMIN SERPL ELPH-MCNC: 1.7 G/DL — LOW (ref 3.3–5)
ALP SERPL-CCNC: 180 U/L — HIGH (ref 40–120)
ALT FLD-CCNC: 55 U/L — SIGNIFICANT CHANGE UP (ref 12–78)
ANION GAP SERPL CALC-SCNC: 6 MMOL/L — SIGNIFICANT CHANGE UP (ref 5–17)
AST SERPL-CCNC: 35 U/L — SIGNIFICANT CHANGE UP (ref 15–37)
BILIRUB SERPL-MCNC: 1 MG/DL — SIGNIFICANT CHANGE UP (ref 0.2–1.2)
BUN SERPL-MCNC: 18 MG/DL — SIGNIFICANT CHANGE UP (ref 7–23)
CALCIUM SERPL-MCNC: 8.4 MG/DL — LOW (ref 8.5–10.1)
CHLORIDE SERPL-SCNC: 108 MMOL/L — SIGNIFICANT CHANGE UP (ref 96–108)
CO2 SERPL-SCNC: 28 MMOL/L — SIGNIFICANT CHANGE UP (ref 22–31)
CREAT SERPL-MCNC: 0.61 MG/DL — SIGNIFICANT CHANGE UP (ref 0.5–1.3)
GLUCOSE SERPL-MCNC: 135 MG/DL — HIGH (ref 70–99)
HCT VFR BLD CALC: 34.2 % — LOW (ref 39–50)
HGB BLD-MCNC: 11.2 G/DL — LOW (ref 13–17)
MAGNESIUM SERPL-MCNC: 1.8 MG/DL — SIGNIFICANT CHANGE UP (ref 1.6–2.6)
MCHC RBC-ENTMCNC: 32 PG — SIGNIFICANT CHANGE UP (ref 27–34)
MCHC RBC-ENTMCNC: 32.7 GM/DL — SIGNIFICANT CHANGE UP (ref 32–36)
MCV RBC AUTO: 97.7 FL — SIGNIFICANT CHANGE UP (ref 80–100)
PHOSPHATE SERPL-MCNC: 3.3 MG/DL — SIGNIFICANT CHANGE UP (ref 2.5–4.5)
PLATELET # BLD AUTO: 229 K/UL — SIGNIFICANT CHANGE UP (ref 150–400)
POTASSIUM SERPL-MCNC: 4.1 MMOL/L — SIGNIFICANT CHANGE UP (ref 3.5–5.3)
POTASSIUM SERPL-SCNC: 4.1 MMOL/L — SIGNIFICANT CHANGE UP (ref 3.5–5.3)
PROT SERPL-MCNC: 6.6 GM/DL — SIGNIFICANT CHANGE UP (ref 6–8.3)
RBC # BLD: 3.5 M/UL — LOW (ref 4.2–5.8)
RBC # FLD: 13 % — SIGNIFICANT CHANGE UP (ref 10.3–14.5)
SODIUM SERPL-SCNC: 142 MMOL/L — SIGNIFICANT CHANGE UP (ref 135–145)
WBC # BLD: 4.2 K/UL — SIGNIFICANT CHANGE UP (ref 3.8–10.5)
WBC # FLD AUTO: 4.2 K/UL — SIGNIFICANT CHANGE UP (ref 3.8–10.5)

## 2020-05-13 PROCEDURE — 99231 SBSQ HOSP IP/OBS SF/LOW 25: CPT

## 2020-05-13 RX ORDER — FUROSEMIDE 40 MG
40 TABLET ORAL ONCE
Refills: 0 | Status: COMPLETED | OUTPATIENT
Start: 2020-05-13 | End: 2020-05-13

## 2020-05-13 RX ORDER — MAGNESIUM SULFATE 500 MG/ML
2 VIAL (ML) INJECTION ONCE
Refills: 0 | Status: COMPLETED | OUTPATIENT
Start: 2020-05-13 | End: 2020-05-13

## 2020-05-13 RX ORDER — PROPOFOL 10 MG/ML
20 INJECTION, EMULSION INTRAVENOUS
Qty: 1000 | Refills: 0 | Status: DISCONTINUED | OUTPATIENT
Start: 2020-05-13 | End: 2020-05-16

## 2020-05-13 RX ORDER — MIDODRINE HYDROCHLORIDE 2.5 MG/1
10 TABLET ORAL EVERY 8 HOURS
Refills: 0 | Status: DISCONTINUED | OUTPATIENT
Start: 2020-05-13 | End: 2020-05-15

## 2020-05-13 RX ORDER — MIDODRINE HYDROCHLORIDE 2.5 MG/1
10 TABLET ORAL ONCE
Refills: 0 | Status: COMPLETED | OUTPATIENT
Start: 2020-05-13 | End: 2020-05-13

## 2020-05-13 RX ORDER — FENTANYL CITRATE 50 UG/ML
50 INJECTION INTRAVENOUS ONCE
Refills: 0 | Status: DISCONTINUED | OUTPATIENT
Start: 2020-05-13 | End: 2020-05-13

## 2020-05-13 RX ADMIN — DEXMEDETOMIDINE HYDROCHLORIDE IN 0.9% SODIUM CHLORIDE 11.8 MICROGRAM(S)/KG/HR: 4 INJECTION INTRAVENOUS at 02:56

## 2020-05-13 RX ADMIN — Medication 1 MILLIGRAM(S): at 12:47

## 2020-05-13 RX ADMIN — Medication 650 MILLIGRAM(S): at 06:45

## 2020-05-13 RX ADMIN — Medication 15 MILLILITER(S): at 12:48

## 2020-05-13 RX ADMIN — GABAPENTIN 400 MILLIGRAM(S): 400 CAPSULE ORAL at 22:28

## 2020-05-13 RX ADMIN — MIDODRINE HYDROCHLORIDE 5 MILLIGRAM(S): 2.5 TABLET ORAL at 05:52

## 2020-05-13 RX ADMIN — DEXMEDETOMIDINE HYDROCHLORIDE IN 0.9% SODIUM CHLORIDE 11.8 MICROGRAM(S)/KG/HR: 4 INJECTION INTRAVENOUS at 04:11

## 2020-05-13 RX ADMIN — DEXMEDETOMIDINE HYDROCHLORIDE IN 0.9% SODIUM CHLORIDE 11.8 MICROGRAM(S)/KG/HR: 4 INJECTION INTRAVENOUS at 00:31

## 2020-05-13 RX ADMIN — PROPOFOL 14.1 MICROGRAM(S)/KG/MIN: 10 INJECTION, EMULSION INTRAVENOUS at 21:19

## 2020-05-13 RX ADMIN — DEXMEDETOMIDINE HYDROCHLORIDE IN 0.9% SODIUM CHLORIDE 11.8 MICROGRAM(S)/KG/HR: 4 INJECTION INTRAVENOUS at 06:43

## 2020-05-13 RX ADMIN — MIDODRINE HYDROCHLORIDE 5 MILLIGRAM(S): 2.5 TABLET ORAL at 14:00

## 2020-05-13 RX ADMIN — Medication 2 MILLIGRAM(S): at 11:37

## 2020-05-13 RX ADMIN — MIDODRINE HYDROCHLORIDE 10 MILLIGRAM(S): 2.5 TABLET ORAL at 18:32

## 2020-05-13 RX ADMIN — CHLORHEXIDINE GLUCONATE 15 MILLILITER(S): 213 SOLUTION TOPICAL at 16:55

## 2020-05-13 RX ADMIN — Medication 100 MILLIGRAM(S): at 12:48

## 2020-05-13 RX ADMIN — DEXMEDETOMIDINE HYDROCHLORIDE IN 0.9% SODIUM CHLORIDE 11.8 MICROGRAM(S)/KG/HR: 4 INJECTION INTRAVENOUS at 05:45

## 2020-05-13 RX ADMIN — Medication 1 MILLIGRAM(S): at 07:30

## 2020-05-13 RX ADMIN — CHLORHEXIDINE GLUCONATE 15 MILLILITER(S): 213 SOLUTION TOPICAL at 05:52

## 2020-05-13 RX ADMIN — GABAPENTIN 400 MILLIGRAM(S): 400 CAPSULE ORAL at 05:51

## 2020-05-13 RX ADMIN — Medication 50 GRAM(S): at 08:42

## 2020-05-13 RX ADMIN — PANTOPRAZOLE SODIUM 40 MILLIGRAM(S): 20 TABLET, DELAYED RELEASE ORAL at 12:48

## 2020-05-13 RX ADMIN — FENTANYL CITRATE 50 MICROGRAM(S): 50 INJECTION INTRAVENOUS at 16:00

## 2020-05-13 RX ADMIN — Medication 40 MILLIGRAM(S): at 10:45

## 2020-05-13 RX ADMIN — SENNA PLUS 2 TABLET(S): 8.6 TABLET ORAL at 22:27

## 2020-05-13 RX ADMIN — GABAPENTIN 400 MILLIGRAM(S): 400 CAPSULE ORAL at 14:30

## 2020-05-13 RX ADMIN — Medication 4 MILLIGRAM(S): at 15:55

## 2020-05-13 RX ADMIN — MIDODRINE HYDROCHLORIDE 10 MILLIGRAM(S): 2.5 TABLET ORAL at 22:27

## 2020-05-13 RX ADMIN — ENOXAPARIN SODIUM 40 MILLIGRAM(S): 100 INJECTION SUBCUTANEOUS at 12:47

## 2020-05-13 NOTE — PROGRESS NOTE ADULT - ASSESSMENT
66 y/o male PMHx  chronic Afib s/p Watchman's device (4/19), HTN, prior CVAs, ETOH abuse, smoker, COPD, HFpEF, Cirrhosis, s/p L BKA admitted on 4/18 with acute COPD exacerbation, with hospital course complicated by ETOH withdrawal, delirium tremens, acute on chronic hypercapnic respiratory failure necessitating emergent intubation, AFib with RVR. Now with severe sepsis secondary to MRSA and CRE Pseudomonas pneumonia.      Neuro: Hospital course prolonged by toxic metabolic encephalopathy, multifactorial secondary to severe sepsis, DTs. On Precedex for comfort, however has periods of agitation, on Ativan PRN    Pulm: Intubated for acute on chronic hypercapnic hypoxemic respiratory failure, has been failing weaning trials due to tachypnea. Augmenting vent to pH >7.2 and paO2 55-80. Has been trached twice in the past 2015, 2017. Failed SBT again today. Will ultimately require trach again, discussed with daughter Litzy- amenable to proceeding with trach. Will consult Thoracic.    CV: Distributive shock initially resolving, on Midodrine, will wean down to 5mg TID. Afib RVR in the setting of sepsis, now rate controlled on Cardizem. Supplementing lytes to K >4, Mg >2 for optimal arrhythmia Continue Aspirin and Plavix    GI: NGT replaced this morning. Tube Feeds via NGT in lieu of IV fluids. PPI GI ppx.    Renal: Monitoring renal indices, ATN resolved. Free water for hyperchloremic hypernatremia, improved.     ID: Severe sepsis MRSA and CRE Pseudomonas PNA, completed course of Vancomycin and Cipro, observing off abx. No leukocytosis, afebrile.     Heme: Lovenox for DVT ppx. 64 y/o male PMHx  chronic Afib s/p Watchman's device (4/19), HTN, prior CVAs, ETOH abuse, smoker, COPD, HFpEF, Cirrhosis, s/p L BKA admitted on 4/18 with acute COPD exacerbation, with hospital course complicated by ETOH withdrawal, delirium tremens, acute on chronic hypercapnic respiratory failure necessitating emergent intubation, AFib with RVR. Now with severe sepsis secondary to MRSA and CRE Pseudomonas pneumonia.      Neuro: Hospital course prolonged by toxic metabolic encephalopathy, multifactorial secondary to severe sepsis, DTs. On Precedex for comfort, however has periods of agitation with subsequent hypoxia, responds well to Ativan.     Pulm: Intubated for acute on chronic hypercapnic hypoxemic respiratory failure, has been failing weaning trials due to tachypnea. Augmenting vent to pH >7.2 and paO2 55-80. Has been trached twice in the past 2015, 2017. Failed SBT again today. Obtained consent from patient's daughter for trach, d/w thoracic team Dr. Morris plan to trach on Friday.    CV: Distributive shock initially resolving, still requires Midodrine. Afib RVR in the setting of sepsis, now rate controlled on Cardizem. Supplementing lytes to K >4, Mg >2 for optimal arrhythmia suppression. Given dose of Lasix today, >1700cc UOP in response. Will continue to diurese on PRN basis.     GI: Tube Feeds via NGT in lieu of IV fluids. PPI GI ppx.    Renal: Monitoring renal indices, ATN resolved. Free water for hyperchloremic hypernatremia, improved.     ID: Severe sepsis MRSA and CRE Pseudomonas PNA, completed course of Vancomycin and Cipro, observing off abx. No leukocytosis, low grade fever Tmax 100.6'F    Heme: Lovenox for DVT ppx. 66 y/o male PMHx  chronic Afib s/p Watchman's device (4/19), HTN, prior CVAs, ETOH abuse, smoker, COPD, HFpEF, Cirrhosis, s/p L BKA admitted on 4/18 with acute COPD exacerbation, with hospital course complicated by ETOH withdrawal, delirium tremens, acute on chronic hypercapnic respiratory failure necessitating emergent intubation, AFib with RVR. Now with severe sepsis secondary to MRSA and CRE Pseudomonas pneumonia.      Neuro: Hospital course prolonged by toxic metabolic encephalopathy, multifactorial secondary to severe sepsis, DTs. On Precedex for comfort, however has periods of agitation with subsequent hypoxia, and has been biting through ETT and bite block. Will change sedation to Propofol while awaiting trach on Friday, also responds well to Ativan PRN.     Pulm: Intubated for acute on chronic hypercapnic hypoxemic respiratory failure, has been failing weaning trials due to tachypnea. Augmenting vent to pH >7.2 and paO2 55-80. Has been trached twice in the past 2015, 2017. Failed SBT again today. Obtained consent from patient's daughter for trach, d/w thoracic team Dr. Morris plan to trach on Friday.    CV: Distributive shock initially resolving, still requires Midodrine. Afib RVR in the setting of sepsis, now rate controlled on Cardizem. Supplementing lytes to K >4, Mg >2 for optimal arrhythmia suppression. Given dose of Lasix today, >1700cc UOP in response. Will continue to diurese on PRN basis.     GI: Tube Feeds via NGT in lieu of IV fluids. PPI GI ppx.    Renal: Monitoring renal indices, ATN resolved. Free water for hyperchloremic hypernatremia, improved.     ID: Severe sepsis MRSA and CRE Pseudomonas PNA, completed course of Vancomycin and Cipro, observing off abx. No leukocytosis, low grade fever Tmax 100.6'F    Heme: Lovenox for DVT ppx.

## 2020-05-13 NOTE — PROGRESS NOTE ADULT - SUBJECTIVE AND OBJECTIVE BOX
Subjective:    pat on vent, on percedex.    Home Medications:  albuterol 2.5 mg/3 mL (0.083%) inhalation solution: 3 milliliter(s) inhaled every 6 hours, As Needed -for shortness of breath and/or wheezing (19 Apr 2020 03:12)  gabapentin 400 mg oral capsule: 1 cap(s) orally 3 times a day (19 Apr 2020 03:12)  pantoprazole 40 mg oral granule, enteric coated: 40 milligram(s) orally once a day (19 Apr 2020 03:12)  Spiriva 18 mcg inhalation capsule: 1 cap(s) inhaled once a day (19 Apr 2020 03:12)  tamsulosin 0.4 mg oral capsule: 1 cap(s) orally once a day (at bedtime) (19 Apr 2020 03:12)  traZODone 50 mg oral tablet: 2 tab(s) orally once a day (at bedtime), As Needed (19 Apr 2020 03:12)    MEDICATIONS  (STANDING):  ALBUTerol    90 MICROgram(s) HFA Inhaler 2 Puff(s) Inhalation every 4 hours  budesonide 160 MICROgram(s)/formoterol 4.5 MICROgram(s) Inhaler 2 Puff(s) Inhalation two times a day  chlorhexidine 0.12% Liquid 15 milliLiter(s) Oral Mucosa every 12 hours  dexMEDEtomidine Infusion 0.4 MICROgram(s)/kG/Hr (11.8 mL/Hr) IV Continuous <Continuous>  enoxaparin Injectable 40 milliGRAM(s) SubCutaneous daily  folic acid 1 milliGRAM(s) Oral daily  gabapentin 400 milliGRAM(s) Oral three times a day  midodrine 5 milliGRAM(s) Oral every 8 hours  multivitamin/minerals/iron Oral Solution (CENTRUM) 15 milliLiter(s) Oral daily  pantoprazole  Injectable 40 milliGRAM(s) IV Push daily  senna 2 Tablet(s) Oral at bedtime  thiamine 100 milliGRAM(s) Oral daily  tiotropium 18 MICROgram(s) Capsule 1 Capsule(s) Inhalation daily    MEDICATIONS  (PRN):  acetaminophen   Tablet .. 650 milliGRAM(s) Oral every 6 hours PRN Temp greater or equal to 38.5C (101.3F)  diltiazem    Tablet 30 milliGRAM(s) Oral every 6 hours PRN HR > 110  LORazepam     Tablet 2 milliGRAM(s) Oral every 4 hours PRN Agitation  LORazepam   Injectable 1 milliGRAM(s) IV Push every 4 hours PRN Anxiety  metoclopramide Injectable 10 milliGRAM(s) IV Push every 8 hours PRN Vomiting  polyethylene glycol 3350 17 Gram(s) Oral daily PRN Constipation      Allergies    Ceclor (Rash)  Duricef (Rash)    Intolerances        Vital Signs Last 24 Hrs  T(C): 37.6 (13 May 2020 12:05), Max: 38.2 (12 May 2020 21:21)  T(F): 99.6 (13 May 2020 12:05), Max: 100.7 (12 May 2020 21:21)  HR: 74 (13 May 2020 12:05) (65 - 82)  BP: 111/68 (13 May 2020 12:05) (98/55 - 147/78)  BP(mean): 77 (13 May 2020 12:05) (63 - 93)  RR: 12 (13 May 2020 12:05) (12 - 23)  SpO2: 100% (13 May 2020 12:05) (94% - 100%)  Mode: AC/ CMV (Assist Control/ Continuous Mandatory Ventilation)  RR (machine): 12  TV (machine): 550  FiO2: 30  PEEP: 8  ITime: 1  MAP: 13  PIP: 40      PHYSICAL EXAMINATION:    NECK:  Supple. No lymphadenopathy. Jugular venous pressure not elevated. Carotids equal.   HEART:   The cardiac impulse has a normal quality. Reg., Nl S1 and S2.  There are no murmurs, rubs or gallops noted  CHEST:  Chest crackles to auscultation. Normal respiratory effort.  ABDOMEN:  Soft and nontender.   EXTREMITIES:  There is no edema.       LABS:                        11.2   4.20  )-----------( 229      ( 13 May 2020 06:54 )             34.2     05-13    142  |  108  |  18  ----------------------------<  135<H>  4.1   |  28  |  0.61    Ca    8.4<L>      13 May 2020 06:54  Phos  3.3     05-13  Mg     1.8     05-13    TPro  6.6  /  Alb  1.7<L>  /  TBili  1.0  /  DBili  x   /  AST  35  /  ALT  55  /  AlkPhos  180<H>  05-13

## 2020-05-13 NOTE — PROGRESS NOTE ADULT - ASSESSMENT
PROBLEMS;    Ac on chronic hypercapnic & hypoxamic respiratory failure-s/p extubation-worsening ventilation  sputum-Few Pseudomonas aeruginosa (Carbapenem Resistant)/Moderate Methicillin resistant Staphylococcus aureus  afib with RVR  OHS/VIJAY  AC flare of COPD/chronic vs acute on chronic bronchitis  Mild interstitial lung disease-NSIP h/o smoking  COVID negativex2  Pulmonary hypertension  Nonobstructing stone in the left ureterovesicular junction/ nonobstructing stone in the lower pole right kidney.  Subacute hemorrhage in the right rectus abdominis along the anterior sheath, measures 1.6 cm in thickness and extends from the umbilicus to the inferior myotendinous junction  Cirrhosis  Mild splenomegaly-portal venous hypertension.  Chronic afib w Watchman device  CHF  BPH  GERD  ETOH abuse  seizures disorder    PLAN;    vent support-pat multiple failed weaning attempts- need a trach  iv percedex  aerosols  supportive care  covid repeat testing-neg  d/w staff

## 2020-05-13 NOTE — PROGRESS NOTE ADULT - SUBJECTIVE AND OBJECTIVE BOX
Patient is a 65y old  Male who presents with a chief complaint of acute hypoxemic, hypercapneic resp failure  COPD exacerbation (13 May 2020 14:03)      BRIEF HOSPITAL COURSE: 66 y/o male PMHx  chronic Afib s/p Watchman's device (4/19), HTN, prior CVAs, ETOH abuse, smoker, COPD, HFpEF, Cirrhosis, s/p L BKA admitted on 4/18 with acute COPD exacerbation. Hospital course complicated by ETOH withdrawal, delirium tremens. Pt was RRT due AMS and respiratory distress. CT head negative for any acute pathology. ABG with acute on chronic hypercapnic respiratory failure. Patient was intubated and then extubated on 4/26. Course complicated by AFib w/ RVR on Cardizem infusion, and ongoing encephalopathy. Intubated urgently 4/29 AM for worsening mental status, obtundation, inability to protect airway.        Events last 24 hours: Remains intubated on full vent support, failed SBT quickly today for tachypnea and low Tv. Episode of hypoxemia with SPO2 60's, agitated, arrived at bedside to find patient being ambu-bagged. No resistance on manual ventilation. Prior issues with biting through ETT requiring tube exchange. Placed back on ventilator, Ppeak 48, Pplat 26-30, receiving targeted Tv >550. Titrated to 100% Fio2 PEEP +15. Sedated with Ativan with eventual recruitment.      PAST MEDICAL & SURGICAL HISTORY:  Chronic CHF  COPD without exacerbation  Atrial fibrillation  Presence of Watchman left atrial appendage closure device  Hypertension  GERD (gastroesophageal reflux disease)  Chronic obstructive pulmonary disease (COPD)  Anemia  Falls  Meningitis  Collapsed lung  Alcohol withdrawal  Emphysema of lung  Cirrhosis  CHF (congestive heart failure)  Poor historian  Alcohol abuse  Chronic atrial fibrillation  Unilateral amputation of lower extremity below knee  Presence of Watchman left atrial appendage closure device  S/P BKA (below knee amputation) unilateral, left        SOCIAL HISTORY: UATO due to AMS      Review of Systems: Due to altered mental status/intubation, subjective information was not able to be obtained from the patient. History was obtained, to the extent possible, from review of the chart and collateral sources of information.      Medications:  diltiazem    Tablet 30 milliGRAM(s) Oral every 6 hours PRN  midodrine 5 milliGRAM(s) Oral every 8 hours  ALBUTerol    90 MICROgram(s) HFA Inhaler 2 Puff(s) Inhalation every 4 hours  budesonide 160 MICROgram(s)/formoterol 4.5 MICROgram(s) Inhaler 2 Puff(s) Inhalation two times a day  tiotropium 18 MICROgram(s) Capsule 1 Capsule(s) Inhalation daily  acetaminophen   Tablet .. 650 milliGRAM(s) Oral every 6 hours PRN  dexMEDEtomidine Infusion 0.4 MICROgram(s)/kG/Hr IV Continuous <Continuous>  gabapentin 400 milliGRAM(s) Oral three times a day  LORazepam     Tablet 2 milliGRAM(s) Oral every 4 hours PRN  LORazepam   Injectable 1 milliGRAM(s) IV Push every 4 hours PRN  metoclopramide Injectable 10 milliGRAM(s) IV Push every 8 hours PRN  enoxaparin Injectable 40 milliGRAM(s) SubCutaneous daily  pantoprazole  Injectable 40 milliGRAM(s) IV Push daily  polyethylene glycol 3350 17 Gram(s) Oral daily PRN  senna 2 Tablet(s) Oral at bedtime  folic acid 1 milliGRAM(s) Oral daily  multivitamin/minerals/iron Oral Solution (CENTRUM) 15 milliLiter(s) Oral daily  thiamine 100 milliGRAM(s) Oral daily  chlorhexidine 0.12% Liquid 15 milliLiter(s) Oral Mucosa every 12 hours        Mode: AC/ CMV (Assist Control/ Continuous Mandatory Ventilation)  RR (machine): 12  TV (machine): 550  FiO2: 30  PEEP: 8  ITime: 1  MAP: 13  PIP: 40        ICU Vital Signs Last 24 Hrs  T(C): 37.6 (13 May 2020 12:05), Max: 38.2 (12 May 2020 21:21)  T(F): 99.6 (13 May 2020 12:05), Max: 100.7 (12 May 2020 21:21)  HR: 87 (13 May 2020 14:00) (65 - 87)  BP: 108/56 (13 May 2020 14:00) (98/55 - 147/78)  BP(mean): 69 (13 May 2020 14:00) (63 - 93)  ABP: --  ABP(mean): --  RR: 20 (13 May 2020 14:00) (12 - 23)  SpO2: 97% (13 May 2020 14:00) (94% - 100%)          I&O's Detail    12 May 2020 07:01  -  13 May 2020 07:00  --------------------------------------------------------  IN:    dexmedetomidine Infusion: 504 mL    Enteral Tube Flush: 200 mL    ns in tub fed  [id number]: 821 mL  Total IN: 1525 mL    OUT:    Incontinent per Condom Catheter: 650 mL  Total OUT: 650 mL    Total NET: 875 mL      13 May 2020 07:01  -  13 May 2020 15:33  --------------------------------------------------------  IN:  Total IN: 0 mL    OUT:    Incontinent per Condom Catheter: 1750 mL  Total OUT: 1750 mL    Total NET: -1750 mL            LABS:                        11.2   4.20  )-----------( 229      ( 13 May 2020 06:54 )             34.2     05-13    142  |  108  |  18  ----------------------------<  135<H>  4.1   |  28  |  0.61    Ca    8.4<L>      13 May 2020 06:54  Phos  3.3     05-13  Mg     1.8     05-13    TPro  6.6  /  Alb  1.7<L>  /  TBili  1.0  /  DBili  x   /  AST  35  /  ALT  55  /  AlkPhos  180<H>  05-13          CAPILLARY BLOOD GLUCOSE            CULTURES:        Physical Examination:    General: Toxic encephalopathic    HEENT: Pupils equal, reactive to light. Symmetric.    PULM: Diffuse ronchi    CVS: AFib rate controlled    ABD: Softly distended    EXT: Mild generalized anasarca. LLE BKA    SKIN: Warm and well perfused, skin flushed diffusely     NEURO: RASS +1        RADIOLOGY: prior CXR reviewed         INVASIVE LINES: X   INDWELLING NOVAK: X   VTE PROPHYLAXIS: Lovenox   CAM ICU: +   CODE STATUS: FULL        CRITICAL CARE TIME SPENT: 32 minutes spent performing frequent bedside reassessments and augmenting plan of care to address problems of acute critical illness that pose high probability of life threatening deterioration and/or end organ damage/dysfunction and discussing goals of care with patient's family, non-inclusive of time spent on procedures performed. Patient is a 65y old  Male who presents with a chief complaint of acute hypoxemic, hypercapneic resp failure  COPD exacerbation (13 May 2020 14:03)      BRIEF HOSPITAL COURSE: 64 y/o male PMHx  chronic Afib s/p Watchman's device (4/19), HTN, prior CVAs, ETOH abuse, smoker, COPD, HFpEF, Cirrhosis, s/p L BKA admitted on 4/18 with acute COPD exacerbation. Hospital course complicated by ETOH withdrawal, delirium tremens. Pt was RRT due AMS and respiratory distress. CT head negative for any acute pathology. ABG with acute on chronic hypercapnic respiratory failure. Patient was intubated and then extubated on 4/26. Course complicated by AFib w/ RVR on Cardizem infusion, and ongoing encephalopathy. Intubated urgently 4/29 AM for worsening mental status, obtundation, inability to protect airway.        Events last 24 hours: Remains intubated on full vent support, failed SBT quickly today for tachypnea and low Tv. Episode of hypoxemia with SPO2 60's, agitated, arrived at bedside to find patient being ambu-bagged. No resistance on manual ventilation. Prior issues with biting through ETT requiring tube exchange. Placed back on ventilator, Ppeak 48, Pplat 26-30, receiving targeted Tv >550. Titrated to 100% Fio2 PEEP +15. Sedated with Ativan with eventual recruitment.      PAST MEDICAL & SURGICAL HISTORY:  Chronic CHF  COPD without exacerbation  Atrial fibrillation  Presence of Watchman left atrial appendage closure device  Hypertension  GERD (gastroesophageal reflux disease)  Chronic obstructive pulmonary disease (COPD)  Anemia  Falls  Meningitis  Collapsed lung  Alcohol withdrawal  Emphysema of lung  Cirrhosis  CHF (congestive heart failure)  Poor historian  Alcohol abuse  Chronic atrial fibrillation  Unilateral amputation of lower extremity below knee  Presence of Watchman left atrial appendage closure device  S/P BKA (below knee amputation) unilateral, left        SOCIAL HISTORY: UATO due to AMS      Review of Systems: Due to altered mental status/intubation, subjective information was not able to be obtained from the patient. History was obtained, to the extent possible, from review of the chart and collateral sources of information.      Medications:  diltiazem    Tablet 30 milliGRAM(s) Oral every 6 hours PRN  midodrine 5 milliGRAM(s) Oral every 8 hours  ALBUTerol    90 MICROgram(s) HFA Inhaler 2 Puff(s) Inhalation every 4 hours  budesonide 160 MICROgram(s)/formoterol 4.5 MICROgram(s) Inhaler 2 Puff(s) Inhalation two times a day  tiotropium 18 MICROgram(s) Capsule 1 Capsule(s) Inhalation daily  acetaminophen   Tablet .. 650 milliGRAM(s) Oral every 6 hours PRN  dexMEDEtomidine Infusion 0.4 MICROgram(s)/kG/Hr IV Continuous <Continuous>  gabapentin 400 milliGRAM(s) Oral three times a day  LORazepam     Tablet 2 milliGRAM(s) Oral every 4 hours PRN  LORazepam   Injectable 1 milliGRAM(s) IV Push every 4 hours PRN  metoclopramide Injectable 10 milliGRAM(s) IV Push every 8 hours PRN  enoxaparin Injectable 40 milliGRAM(s) SubCutaneous daily  pantoprazole  Injectable 40 milliGRAM(s) IV Push daily  polyethylene glycol 3350 17 Gram(s) Oral daily PRN  senna 2 Tablet(s) Oral at bedtime  folic acid 1 milliGRAM(s) Oral daily  multivitamin/minerals/iron Oral Solution (CENTRUM) 15 milliLiter(s) Oral daily  thiamine 100 milliGRAM(s) Oral daily  chlorhexidine 0.12% Liquid 15 milliLiter(s) Oral Mucosa every 12 hours        Mode: AC/ CMV (Assist Control/ Continuous Mandatory Ventilation)  RR (machine): 12  TV (machine): 550  FiO2: 30  PEEP: 8  ITime: 1  MAP: 13  PIP: 40        ICU Vital Signs Last 24 Hrs  T(C): 37.6 (13 May 2020 12:05), Max: 38.2 (12 May 2020 21:21)  T(F): 99.6 (13 May 2020 12:05), Max: 100.7 (12 May 2020 21:21)  HR: 87 (13 May 2020 14:00) (65 - 87)  BP: 108/56 (13 May 2020 14:00) (98/55 - 147/78)  BP(mean): 69 (13 May 2020 14:00) (63 - 93)  ABP: --  ABP(mean): --  RR: 20 (13 May 2020 14:00) (12 - 23)  SpO2: 97% (13 May 2020 14:00) (94% - 100%)          I&O's Detail    12 May 2020 07:01  -  13 May 2020 07:00  --------------------------------------------------------  IN:    dexmedetomidine Infusion: 504 mL    Enteral Tube Flush: 200 mL    ns in tub fed  [id number]: 821 mL  Total IN: 1525 mL    OUT:    Incontinent per Condom Catheter: 650 mL  Total OUT: 650 mL    Total NET: 875 mL      13 May 2020 07:01  -  13 May 2020 15:33  --------------------------------------------------------  IN:  Total IN: 0 mL    OUT:    Incontinent per Condom Catheter: 1750 mL  Total OUT: 1750 mL    Total NET: -1750 mL            LABS:                        11.2   4.20  )-----------( 229      ( 13 May 2020 06:54 )             34.2     05-13    142  |  108  |  18  ----------------------------<  135<H>  4.1   |  28  |  0.61    Ca    8.4<L>      13 May 2020 06:54  Phos  3.3     05-13  Mg     1.8     05-13    TPro  6.6  /  Alb  1.7<L>  /  TBili  1.0  /  DBili  x   /  AST  35  /  ALT  55  /  AlkPhos  180<H>  05-13          CAPILLARY BLOOD GLUCOSE            CULTURES:        Physical Examination:    General: Toxic encephalopathic    HEENT: Pupils equal, reactive to light. Symmetric.    PULM: Diffuse ronchi    CVS: AFib rate controlled    ABD: Softly distended    EXT: Mild generalized anasarca. LLE BKA    SKIN: Warm and well perfused, skin flushed diffusely     NEURO: RASS +1        RADIOLOGY: prior CXR reviewed         INVASIVE LINES: X   INDWELLING NOVAK: X   VTE PROPHYLAXIS: Lovenox   CAM ICU: +   CODE STATUS: FULL        CRITICAL CARE TIME SPENT: 36 minutes spent performing frequent bedside reassessments and augmenting plan of care to address problems of acute critical illness that pose high probability of life threatening deterioration and/or end organ damage/dysfunction and discussing goals of care with patient's family, non-inclusive of time spent on procedures performed.

## 2020-05-13 NOTE — PROGRESS NOTE ADULT - SUBJECTIVE AND OBJECTIVE BOX
Subjective: Pt in bed Intubated and sedated     T(C): 37.6 (05-13-20 @ 12:05), Max: 38.2 (05-12-20 @ 21:21)  HR: 74 (05-13-20 @ 12:05) (65 - 82)  BP: 111/68 (05-13-20 @ 12:05) (98/55 - 147/78)    RR: 12 (05-13-20 @ 12:05) (12 - 23)  SpO2: 100% (05-13-20 @ 12:05) (94% - 100%)  Tele: SR             05-13    142  |  108  |  18  ----------------------------<  135<H>  4.1   |  28  |  0.61    Ca    8.4<L>      13 May 2020 06:54  Phos  3.3     05-13  Mg     1.8     05-13    TPro  6.6  /  Alb  1.7<L>  /  TBili  1.0  /  DBili  x   /  AST  35  /  ALT  55  /  AlkPhos  180<H>  05-13                               11.2   4.20  )-----------( 229      ( 13 May 2020 06:54 )             34.2                 CAPILLARY BLOOD GLUCOSE               CXR: < from: Xray Chest 1 View-PORTABLE IMMEDIATE (05.12.20 @ 11:10) >    Comparison: Prior study from 5/7/2020.   CT of the chest from 4/19/2020.      Findings:  Tubes and lines: The nasogastric tube is malpositioned, coiled at the mid esophagus and extends superiorly; the tib is excluded from the field of view.   The endotracheal tube is again seen with the tip terminating approximately 5.5 cm above the regina.      Lungs and pleura:  There is a small right pleural effusion and adjacent atelectasis/consolidation, persistent but overall appears improved compared to the prior study.  Question mild pulmonary vascular congestion.  There is no pneumothorax.    Heart and mediastinum: The cardiomediastinal silhouette is stably enlarged.  Left atrial device again seen.   The aorta is calcified.      Bones and soft tissue: There are no obvious acute osseous or soft tissue abnormalities.    IMPRESSION:   Malpositioned nasogastric tube.  Recommend repositioning.    Right pleural effusion and adjacent atelectasis/consolidation, persistent but overall appears improved compared to the prior study.       Question mild pulmonary vascular congestion.      Stable cardiomegaly.      < end of copied text >          EXAM  Neuro: Alert Awake   Pulm: decreased at bases   CV: RRR  S1 S2  Abd:  soft NT ND + BS           Assessment:  65yMale    with PAST MEDICAL & SURGICAL HISTORY:  Chronic CHF  COPD without exacerbation  Atrial fibrillation  Presence of Watchman left atrial appendage closure device  Hypertension  GERD (gastroesophageal reflux disease)  Chronic obstructive pulmonary disease (COPD)  Anemia  Falls  Meningitis  Collapsed lung  Alcohol withdrawal  Emphysema of lung  Cirrhosis  CHF (congestive heart failure)  Poor historian  Alcohol abuse  Chronic atrial fibrillation  Unilateral amputation of lower extremity below knee  Presence of Watchman left atrial appendage closure device  S/P BKA (below knee amputation) unilateral, left        Plan:

## 2020-05-13 NOTE — CHART NOTE - NSCHARTNOTEFT_GEN_A_CORE
s/w pt's dtr, Litzy, [phone], d/w pt's planned for trach.  She's agreeable and willing to d/w CTS for consent.    All Qs answered.

## 2020-05-13 NOTE — PROGRESS NOTE ADULT - ASSESSMENT
64 y/o M with history of hypercapnic respiratory failure Trach/PEG in 2015 and 2017,  now intubated from hypercapnic respiratory failure , COVID neg consulted for Trach/PEG    Plan   Recommend stop plavix and ASA for Trach   Plan for Trach potentially  Friday in OR  cont medical mgmt as per CCU  DW Dr Morris

## 2020-05-14 LAB
ALBUMIN SERPL ELPH-MCNC: 1.8 G/DL — LOW (ref 3.3–5)
ALP SERPL-CCNC: 161 U/L — HIGH (ref 40–120)
ALT FLD-CCNC: 57 U/L — SIGNIFICANT CHANGE UP (ref 12–78)
ANION GAP SERPL CALC-SCNC: 5 MMOL/L — SIGNIFICANT CHANGE UP (ref 5–17)
APTT BLD: 31.5 SEC — SIGNIFICANT CHANGE UP (ref 27.5–36.3)
AST SERPL-CCNC: 35 U/L — SIGNIFICANT CHANGE UP (ref 15–37)
BILIRUB SERPL-MCNC: 1.5 MG/DL — HIGH (ref 0.2–1.2)
BUN SERPL-MCNC: 20 MG/DL — SIGNIFICANT CHANGE UP (ref 7–23)
CALCIUM SERPL-MCNC: 8.6 MG/DL — SIGNIFICANT CHANGE UP (ref 8.5–10.1)
CHLORIDE SERPL-SCNC: 105 MMOL/L — SIGNIFICANT CHANGE UP (ref 96–108)
CO2 SERPL-SCNC: 32 MMOL/L — HIGH (ref 22–31)
CREAT SERPL-MCNC: 0.61 MG/DL — SIGNIFICANT CHANGE UP (ref 0.5–1.3)
GLUCOSE SERPL-MCNC: 113 MG/DL — HIGH (ref 70–99)
HCT VFR BLD CALC: 34.2 % — LOW (ref 39–50)
HGB BLD-MCNC: 11 G/DL — LOW (ref 13–17)
INR BLD: 1.21 RATIO — HIGH (ref 0.88–1.16)
MAGNESIUM SERPL-MCNC: 1.9 MG/DL — SIGNIFICANT CHANGE UP (ref 1.6–2.6)
MCHC RBC-ENTMCNC: 31.7 PG — SIGNIFICANT CHANGE UP (ref 27–34)
MCHC RBC-ENTMCNC: 32.2 GM/DL — SIGNIFICANT CHANGE UP (ref 32–36)
MCV RBC AUTO: 98.6 FL — SIGNIFICANT CHANGE UP (ref 80–100)
PHOSPHATE SERPL-MCNC: 3.7 MG/DL — SIGNIFICANT CHANGE UP (ref 2.5–4.5)
PLATELET # BLD AUTO: 236 K/UL — SIGNIFICANT CHANGE UP (ref 150–400)
POTASSIUM SERPL-MCNC: 3.3 MMOL/L — LOW (ref 3.5–5.3)
POTASSIUM SERPL-SCNC: 3.3 MMOL/L — LOW (ref 3.5–5.3)
PROT SERPL-MCNC: 6.4 GM/DL — SIGNIFICANT CHANGE UP (ref 6–8.3)
PROTHROM AB SERPL-ACNC: 13.5 SEC — HIGH (ref 10–12.9)
RBC # BLD: 3.47 M/UL — LOW (ref 4.2–5.8)
RBC # FLD: 13.1 % — SIGNIFICANT CHANGE UP (ref 10.3–14.5)
SARS-COV-2 RNA SPEC QL NAA+PROBE: SIGNIFICANT CHANGE UP
SODIUM SERPL-SCNC: 142 MMOL/L — SIGNIFICANT CHANGE UP (ref 135–145)
WBC # BLD: 4.84 K/UL — SIGNIFICANT CHANGE UP (ref 3.8–10.5)
WBC # FLD AUTO: 4.84 K/UL — SIGNIFICANT CHANGE UP (ref 3.8–10.5)

## 2020-05-14 PROCEDURE — 99232 SBSQ HOSP IP/OBS MODERATE 35: CPT

## 2020-05-14 PROCEDURE — 99291 CRITICAL CARE FIRST HOUR: CPT

## 2020-05-14 RX ORDER — POTASSIUM CHLORIDE 20 MEQ
40 PACKET (EA) ORAL ONCE
Refills: 0 | Status: COMPLETED | OUTPATIENT
Start: 2020-05-14 | End: 2020-05-14

## 2020-05-14 RX ORDER — DILTIAZEM HCL 120 MG
5 CAPSULE, EXT RELEASE 24 HR ORAL
Qty: 125 | Refills: 0 | Status: DISCONTINUED | OUTPATIENT
Start: 2020-05-14 | End: 2020-05-15

## 2020-05-14 RX ORDER — METOPROLOL TARTRATE 50 MG
2.5 TABLET ORAL ONCE
Refills: 0 | Status: COMPLETED | OUTPATIENT
Start: 2020-05-14 | End: 2020-05-14

## 2020-05-14 RX ADMIN — CHLORHEXIDINE GLUCONATE 15 MILLILITER(S): 213 SOLUTION TOPICAL at 17:37

## 2020-05-14 RX ADMIN — PANTOPRAZOLE SODIUM 40 MILLIGRAM(S): 20 TABLET, DELAYED RELEASE ORAL at 11:53

## 2020-05-14 RX ADMIN — PROPOFOL 14.1 MICROGRAM(S)/KG/MIN: 10 INJECTION, EMULSION INTRAVENOUS at 08:00

## 2020-05-14 RX ADMIN — Medication 5 MG/HR: at 22:10

## 2020-05-14 RX ADMIN — GABAPENTIN 400 MILLIGRAM(S): 400 CAPSULE ORAL at 05:12

## 2020-05-14 RX ADMIN — Medication 2 MILLIGRAM(S): at 09:32

## 2020-05-14 RX ADMIN — Medication 40 MILLIEQUIVALENT(S): at 08:12

## 2020-05-14 RX ADMIN — GABAPENTIN 400 MILLIGRAM(S): 400 CAPSULE ORAL at 14:13

## 2020-05-14 RX ADMIN — PROPOFOL 14.1 MICROGRAM(S)/KG/MIN: 10 INJECTION, EMULSION INTRAVENOUS at 20:00

## 2020-05-14 RX ADMIN — MIDODRINE HYDROCHLORIDE 10 MILLIGRAM(S): 2.5 TABLET ORAL at 14:13

## 2020-05-14 RX ADMIN — CHLORHEXIDINE GLUCONATE 15 MILLILITER(S): 213 SOLUTION TOPICAL at 05:12

## 2020-05-14 RX ADMIN — ENOXAPARIN SODIUM 40 MILLIGRAM(S): 100 INJECTION SUBCUTANEOUS at 11:53

## 2020-05-14 RX ADMIN — PROPOFOL 14.1 MICROGRAM(S)/KG/MIN: 10 INJECTION, EMULSION INTRAVENOUS at 12:10

## 2020-05-14 RX ADMIN — Medication 2.5 MILLIGRAM(S): at 04:20

## 2020-05-14 RX ADMIN — PROPOFOL 14.1 MICROGRAM(S)/KG/MIN: 10 INJECTION, EMULSION INTRAVENOUS at 15:11

## 2020-05-14 RX ADMIN — MIDODRINE HYDROCHLORIDE 10 MILLIGRAM(S): 2.5 TABLET ORAL at 05:12

## 2020-05-14 NOTE — PROGRESS NOTE ADULT - SUBJECTIVE AND OBJECTIVE BOX
HPI: Patient seen at a  this morning for AMS and hypoxic  Patient had been admitted with a COPD exacerbation.  It was believed that he went into ETOH withdrawal and had a total of 6MG Ativan over night.  He required intubation 4/23.    4/24: he remains intubated.  Will try to wean this afternoon.      4/25: He weaned this morning and failed with a decreased Tv.  But close to extubation.    4/26: Patient weaned well this morning and was extubated.      5/2: Chart reviewed, Patient was reintubated, failed weaning this morning, CXR with improving infiltrates  5/3: Patient remains on the vent failed weaning x3 today with increased RR and low TV, Off Levophed now  5/4: Failed weaning yesterday, had to change his ET tube yesterday because he chewed through it,     5/5: Pt on Precedex and Diprivan for sedation, Patient failed weaning again today  5/6: Patient failed weaning today with a low TV and High RR  5/7: He again failed weaning, NGT replaced, Fi O2 back to 40%, WBC normal,     5/8: For weaning today, he has failed weaning daily  5/9: Weaning this morning off levo  5/10: He Failed weaning this morning    5/14: PAtient failed weaning again this morning.  For Trach and PEG in 5/15      PAST MEDICAL & SURGICAL HISTORY:  Chronic CHF  COPD without exacerbation  Atrial fibrillation  Presence of Watchman left atrial appendage closure device  Hypertension  GERD (gastroesophageal reflux disease)  Chronic obstructive pulmonary disease (COPD)  Anemia  Falls  Meningitis  Collapsed lung  Alcohol withdrawal  Emphysema of lung  Cirrhosis  CHF (congestive heart failure)  Poor historian  Alcohol abuse  Chronic atrial fibrillation  Unilateral amputation of lower extremity below knee  Presence of Watchman left atrial appendage closure device  S/P BKA (below knee amputation) unilateral, left      FAMILY HISTORY:  No pertinent family history in first degree relatives  Family history unknown: Adopted      Social Hx:    Allergies    Ceclor (Rash)  Duricef (Rash)    Intolerances            ICU Vital Signs Last 24 Hrs  T(C): 37.3 (14 May 2020 08:00), Max: 38.1 (14 May 2020 00:06)  T(F): 99.1 (14 May 2020 08:00), Max: 100.5 (14 May 2020 00:06)  HR: 126 (14 May 2020 10:00) (74 - 138)  BP: 110/54 (14 May 2020 10:00) (85/48 - 116/54)  BP(mean): 68 (14 May 2020 10:00) (55 - 77)  ABP: --  ABP(mean): --  RR: 16 (14 May 2020 10:00) (12 - 31)  SpO2: 94% (14 May 2020 10:00) (93% - 100%)      Mode: AC/ CMV (Assist Control/ Continuous Mandatory Ventilation)  RR (machine): 12  TV (machine): 550  FiO2: 30  PEEP: 8  ITime: 1  MAP: 13  PIP: 29      I&O's Summary    13 May 2020 07:01  -  14 May 2020 07:00  --------------------------------------------------------  IN: 2220 mL / OUT: 2150 mL / NET: 70 mL                              11.0   4.84  )-----------( 236      ( 14 May 2020 06:41 )             34.2       05-14    142  |  105  |  20  ----------------------------<  113<H>  3.3<L>   |  32<H>  |  0.61    Ca    8.6      14 May 2020 06:41  Phos  3.7     05-14  Mg     1.9     05-14    TPro  6.4  /  Alb  1.8<L>  /  TBili  1.5<H>  /  DBili  x   /  AST  35  /  ALT  57  /  AlkPhos  161<H>  05-14                    MEDICATIONS  (STANDING):  ALBUTerol    90 MICROgram(s) HFA Inhaler 2 Puff(s) Inhalation every 4 hours  budesonide 160 MICROgram(s)/formoterol 4.5 MICROgram(s) Inhaler 2 Puff(s) Inhalation two times a day  chlorhexidine 0.12% Liquid 15 milliLiter(s) Oral Mucosa every 12 hours  enoxaparin Injectable 40 milliGRAM(s) SubCutaneous daily  folic acid 1 milliGRAM(s) Oral daily  gabapentin 400 milliGRAM(s) Oral three times a day  midodrine 10 milliGRAM(s) Oral every 8 hours  multivitamin/minerals/iron Oral Solution (CENTRUM) 15 milliLiter(s) Oral daily  pantoprazole  Injectable 40 milliGRAM(s) IV Push daily  propofol Infusion 20 MICROgram(s)/kG/Min (14.1 mL/Hr) IV Continuous <Continuous>  senna 2 Tablet(s) Oral at bedtime  thiamine 100 milliGRAM(s) Oral daily  tiotropium 18 MICROgram(s) Capsule 1 Capsule(s) Inhalation daily    MEDICATIONS  (PRN):  acetaminophen   Tablet .. 650 milliGRAM(s) Oral every 6 hours PRN Temp greater or equal to 38.5C (101.3F)  diltiazem    Tablet 30 milliGRAM(s) Oral every 6 hours PRN HR > 110  LORazepam     Tablet 2 milliGRAM(s) Oral every 4 hours PRN Agitation  LORazepam   Injectable 1 milliGRAM(s) IV Push every 4 hours PRN Anxiety  metoclopramide Injectable 10 milliGRAM(s) IV Push every 8 hours PRN Vomiting  polyethylene glycol 3350 17 Gram(s) Oral daily PRN Constipation      DVT Prophylaxis:    Advanced Directives:  Discussed with:    Visit Information: 30    ** Time is exclusive of billed procedures and/or teaching and/or routine family updates.

## 2020-05-14 NOTE — PROGRESS NOTE ADULT - SUBJECTIVE AND OBJECTIVE BOX
Patient is a 65y old  Male who presents with a chief complaint of acute hypoxemic, hypercapneic resp failure  COPD exacerbation (13 May 2020 15:32)    PAST MEDICAL & SURGICAL HISTORY:  Chronic CHF  COPD without exacerbation  Atrial fibrillation  Presence of Watchman left atrial appendage closure device  Hypertension  GERD (gastroesophageal reflux disease)  Chronic obstructive pulmonary disease (COPD)  Anemia  Falls  Meningitis  Collapsed lung  Alcohol withdrawal  Emphysema of lung  Cirrhosis  CHF (congestive heart failure)  Poor historian  Alcohol abuse  Chronic atrial fibrillation  Unilateral amputation of lower extremity below knee  Presence of Watchman left atrial appendage closure device  S/P BKA (below knee amputation) unilateral, left    CLATUS CARUANA   65y    Male    BRIEF HOSPITAL COURSE:    Review of Systems:   UATO   All other ROS are negative.    Allergies    Ceclor (Rash)  Duricef (Rash)    Intolerances      ICU Vital Signs Last 24 Hrs  T(C): 38.1 (14 May 2020 00:06), Max: 38.1 (13 May 2020 06:45)  T(F): 100.5 (14 May 2020 00:06), Max: 100.6 (13 May 2020 06:45)  HR: 93 (14 May 2020 01:00) (68 - 102)  BP: 97/50 (14 May 2020 01:00) (86/48 - 147/78)  BP(mean): 60 (14 May 2020 01:00) (55 - 93)  ABP: --  ABP(mean): --  RR: 16 (14 May 2020 01:00) (12 - 31)  SpO2: 95% (14 May 2020 01:00) (93% - 100%)    Physical Examination:    General: Toxic encephalopathic    HEENT: Pupils equal, reactive to light. Symmetric.    PULM: Diffuse ronchi    CVS: AFib rate controlled    ABD: Softly distended    EXT: Mild generalized anasarca. LLE BKA    SKIN: Warm and well perfused, skin flushed diffusely     NEURO: RASS +1      Mode: AC/ CMV (Assist Control/ Continuous Mandatory Ventilation)  RR (machine): 12  TV (machine): 550  FiO2: 30  PEEP: 8  ITime: 1  MAP: 18  PIP: 28    Mode: AC/ CMV (Assist Control/ Continuous Mandatory Ventilation), RR (machine): 12, TV (machine): 550, FiO2: 30, PEEP: 8, ITime: 1, MAP: 18, PIP: 28  LABS:                        11.2   4.20  )-----------( 229      ( 13 May 2020 06:54 )             34.2     05-13    142  |  108  |  18  ----------------------------<  135<H>  4.1   |  28  |  0.61    Ca    8.4<L>      13 May 2020 06:54  Phos  3.3     05-13  Mg     1.8     05-13    TPro  6.6  /  Alb  1.7<L>  /  TBili  1.0  /  DBili  x   /  AST  35  /  ALT  55  /  AlkPhos  180<H>  05-13      CAPILLARY BLOOD GLUCOSE      CULTURES:      Medications:  MEDICATIONS  (STANDING):  ALBUTerol    90 MICROgram(s) HFA Inhaler 2 Puff(s) Inhalation every 4 hours  budesonide 160 MICROgram(s)/formoterol 4.5 MICROgram(s) Inhaler 2 Puff(s) Inhalation two times a day  chlorhexidine 0.12% Liquid 15 milliLiter(s) Oral Mucosa every 12 hours  enoxaparin Injectable 40 milliGRAM(s) SubCutaneous daily  folic acid 1 milliGRAM(s) Oral daily  gabapentin 400 milliGRAM(s) Oral three times a day  midodrine 10 milliGRAM(s) Oral every 8 hours  multivitamin/minerals/iron Oral Solution (CENTRUM) 15 milliLiter(s) Oral daily  pantoprazole  Injectable 40 milliGRAM(s) IV Push daily  propofol Infusion 20 MICROgram(s)/kG/Min (14.1 mL/Hr) IV Continuous <Continuous>  senna 2 Tablet(s) Oral at bedtime  thiamine 100 milliGRAM(s) Oral daily  tiotropium 18 MICROgram(s) Capsule 1 Capsule(s) Inhalation daily    MEDICATIONS  (PRN):  acetaminophen   Tablet .. 650 milliGRAM(s) Oral every 6 hours PRN Temp greater or equal to 38.5C (101.3F)  diltiazem    Tablet 30 milliGRAM(s) Oral every 6 hours PRN HR > 110  LORazepam     Tablet 2 milliGRAM(s) Oral every 4 hours PRN Agitation  LORazepam   Injectable 1 milliGRAM(s) IV Push every 4 hours PRN Anxiety  metoclopramide Injectable 10 milliGRAM(s) IV Push every 8 hours PRN Vomiting  polyethylene glycol 3350 17 Gram(s) Oral daily PRN Constipation        05-12 @ 07:01  -  05-13 @ 07:00  --------------------------------------------------------  IN: 1525 mL / OUT: 650 mL / NET: 875 mL    05-13 @ 07:01 - 05-14 @ 01:38  --------------------------------------------------------  IN: 1547.5 mL / OUT: 1850 mL / NET: -302.5 mL        RADIOLOGY/IMAGING/ECHO        Assessment/Plan:    65 year old man with a history of chronic AF, Watchman device (implanted 4/2019), HTN, CVAs, alcoholism, tobacco abuse, COPD, HFpEF (60% 12/2018), GERD, cirrhosis, left BKA following traumatic injury admitted on 4/18/2020 with  acute on chronic hypercapnic & hypoxemic respiratory failure due to suspected exacerbation of COPD and/or bronchitis.    RX for AECOPD and ETOH withdrawal  obtunded  after sedation    S/p RX fro [facility] with MRSA and CRE     Unable to wean from vent  for trach Friday.    DAPT on hold     Sedate with propofol as he will bite through the ETT.

## 2020-05-14 NOTE — PROGRESS NOTE ADULT - SUBJECTIVE AND OBJECTIVE BOX
Subjective:    pat on vent waiting trach, no new events.    Home Medications:  albuterol 2.5 mg/3 mL (0.083%) inhalation solution: 3 milliliter(s) inhaled every 6 hours, As Needed -for shortness of breath and/or wheezing (19 Apr 2020 03:12)  gabapentin 400 mg oral capsule: 1 cap(s) orally 3 times a day (19 Apr 2020 03:12)  pantoprazole 40 mg oral granule, enteric coated: 40 milligram(s) orally once a day (19 Apr 2020 03:12)  Spiriva 18 mcg inhalation capsule: 1 cap(s) inhaled once a day (19 Apr 2020 03:12)  tamsulosin 0.4 mg oral capsule: 1 cap(s) orally once a day (at bedtime) (19 Apr 2020 03:12)  traZODone 50 mg oral tablet: 2 tab(s) orally once a day (at bedtime), As Needed (19 Apr 2020 03:12)    MEDICATIONS  (STANDING):  ALBUTerol    90 MICROgram(s) HFA Inhaler 2 Puff(s) Inhalation every 4 hours  budesonide 160 MICROgram(s)/formoterol 4.5 MICROgram(s) Inhaler 2 Puff(s) Inhalation two times a day  chlorhexidine 0.12% Liquid 15 milliLiter(s) Oral Mucosa every 12 hours  enoxaparin Injectable 40 milliGRAM(s) SubCutaneous daily  gabapentin 400 milliGRAM(s) Oral three times a day  midodrine 10 milliGRAM(s) Oral every 8 hours  pantoprazole  Injectable 40 milliGRAM(s) IV Push daily  propofol Infusion 20 MICROgram(s)/kG/Min (14.1 mL/Hr) IV Continuous <Continuous>  senna 2 Tablet(s) Oral at bedtime  tiotropium 18 MICROgram(s) Capsule 1 Capsule(s) Inhalation daily    MEDICATIONS  (PRN):  acetaminophen   Tablet .. 650 milliGRAM(s) Oral every 6 hours PRN Temp greater or equal to 38.5C (101.3F)  diltiazem    Tablet 30 milliGRAM(s) Oral every 6 hours PRN HR > 110  LORazepam     Tablet 2 milliGRAM(s) Oral every 4 hours PRN Agitation  LORazepam   Injectable 1 milliGRAM(s) IV Push every 4 hours PRN Anxiety  metoclopramide Injectable 10 milliGRAM(s) IV Push every 8 hours PRN Vomiting  polyethylene glycol 3350 17 Gram(s) Oral daily PRN Constipation      Allergies    Ceclor (Rash)  Duricef (Rash)    Intolerances        Vital Signs Last 24 Hrs  T(C): 37.3 (14 May 2020 08:00), Max: 38.1 (14 May 2020 00:06)  T(F): 99.1 (14 May 2020 08:00), Max: 100.5 (14 May 2020 00:06)  HR: 119 (14 May 2020 12:00) (75 - 138)  BP: 96/61 (14 May 2020 12:00) (85/48 - 116/54)  BP(mean): 69 (14 May 2020 12:00) (55 - 75)  RR: 14 (14 May 2020 12:00) (13 - 31)  SpO2: 97% (14 May 2020 12:00) (93% - 100%)  Mode: AC/ CMV (Assist Control/ Continuous Mandatory Ventilation)  RR (machine): 12  TV (machine): 550  FiO2: 30  PEEP: 8  ITime: 1  MAP: 13  PIP: 29      PHYSICAL EXAMINATION:    NECK:  Supple. No lymphadenopathy. Jugular venous pressure not elevated. Carotids equal.   HEART:   The cardiac impulse has a normal quality. Reg., Nl S1 and S2.  There are no murmurs, rubs or gallops noted  CHEST:  Chest poor air entry to auscultation. Normal respiratory effort.  ABDOMEN:  Soft and nontender.   EXTREMITIES:  There is no edema.       LABS:                        11.0   4.84  )-----------( 236      ( 14 May 2020 06:41 )             34.2     05-14    142  |  105  |  20  ----------------------------<  113<H>  3.3<L>   |  32<H>  |  0.61    Ca    8.6      14 May 2020 06:41  Phos  3.7     05-14  Mg     1.9     05-14    TPro  6.4  /  Alb  1.8<L>  /  TBili  1.5<H>  /  DBili  x   /  AST  35  /  ALT  57  /  AlkPhos  161<H>  05-14    PT/INR - ( 14 May 2020 12:13 )   PT: 13.5 sec;   INR: 1.21 ratio         PTT - ( 14 May 2020 12:13 )  PTT:31.5 sec

## 2020-05-14 NOTE — PROGRESS NOTE ADULT - ASSESSMENT
PROBLEMS;    Ac on chronic hypercapnic & hypoxamic respiratory failure-s/p extubation-worsening ventilation  sputum-Few Pseudomonas aeruginosa (Carbapenem Resistant)/Moderate Methicillin resistant Staphylococcus aureus  afib with RVR  OHS/VIJAY  AC flare of COPD/chronic vs acute on chronic bronchitis  Mild interstitial lung disease-NSIP h/o smoking  COVID negativex2  Pulmonary hypertension  Nonobstructing stone in the left ureterovesicular junction/ nonobstructing stone in the lower pole right kidney.  Subacute hemorrhage in the right rectus abdominis along the anterior sheath, measures 1.6 cm in thickness and extends from the umbilicus to the inferior myotendinous junction  Cirrhosis  Mild splenomegaly-portal venous hypertension.  Chronic afib w Watchman device  CHF  BPH  GERD  ETOH abuse  seizures disorder    PLAN;    vent support-pat multiple failed weaning attempts- waiting a trach  iv percedex  aerosols  supportive care  covid repeat testing-neg  d/w staff

## 2020-05-14 NOTE — PROGRESS NOTE ADULT - SUBJECTIVE AND OBJECTIVE BOX
Subjective:  Pt seen, sedated on vent.    Vital Signs:  Vital Signs Last 24 Hrs  T(C): 37.3 (05-14-20 @ 08:00), Max: 38.1 (05-14-20 @ 00:06)  T(F): 99.1 (05-14-20 @ 08:00), Max: 100.5 (05-14-20 @ 00:06)  HR: 119 (05-14-20 @ 12:00) (75 - 138)  BP: 96/61 (05-14-20 @ 12:00) (85/48 - 116/54)  RR: 14 (05-14-20 @ 12:00) (13 - 31)  SpO2: 97% (05-14-20 @ 12:00) (93% - 100%) on (O2)    Telemetry/Alarms:    Relevant labs, radiology and Medications reviewed                        11.0   4.84  )-----------( 236      ( 14 May 2020 06:41 )             34.2     05-14    142  |  105  |  20  ----------------------------<  113<H>  3.3<L>   |  32<H>  |  0.61    Ca    8.6      14 May 2020 06:41  Phos  3.7     05-14  Mg     1.9     05-14    TPro  6.4  /  Alb  1.8<L>  /  TBili  1.5<H>  /  DBili  x   /  AST  35  /  ALT  57  /  AlkPhos  161<H>  05-14      MEDICATIONS  (STANDING):  ALBUTerol    90 MICROgram(s) HFA Inhaler 2 Puff(s) Inhalation every 4 hours  budesonide 160 MICROgram(s)/formoterol 4.5 MICROgram(s) Inhaler 2 Puff(s) Inhalation two times a day  chlorhexidine 0.12% Liquid 15 milliLiter(s) Oral Mucosa every 12 hours  enoxaparin Injectable 40 milliGRAM(s) SubCutaneous daily  gabapentin 400 milliGRAM(s) Oral three times a day  midodrine 10 milliGRAM(s) Oral every 8 hours  pantoprazole  Injectable 40 milliGRAM(s) IV Push daily  propofol Infusion 20 MICROgram(s)/kG/Min (14.1 mL/Hr) IV Continuous <Continuous>  senna 2 Tablet(s) Oral at bedtime  tiotropium 18 MICROgram(s) Capsule 1 Capsule(s) Inhalation daily    MEDICATIONS  (PRN):  acetaminophen   Tablet .. 650 milliGRAM(s) Oral every 6 hours PRN Temp greater or equal to 38.5C (101.3F)  diltiazem    Tablet 30 milliGRAM(s) Oral every 6 hours PRN HR > 110  LORazepam     Tablet 2 milliGRAM(s) Oral every 4 hours PRN Agitation  LORazepam   Injectable 1 milliGRAM(s) IV Push every 4 hours PRN Anxiety  metoclopramide Injectable 10 milliGRAM(s) IV Push every 8 hours PRN Vomiting  polyethylene glycol 3350 17 Gram(s) Oral daily PRN Constipation      Physical exam  Gen sedated on vent  Card RR  neck- supple, trach incision incision healed  Pulm BS b/l  Abd soft  Ext warm, edema    Tubes:    I&O's Summary    13 May 2020 07:01  -  14 May 2020 07:00  --------------------------------------------------------  IN: 2220 mL / OUT: 2150 mL / NET: 70 mL        Assessment  65y Male  w/ PAST MEDICAL & SURGICAL HISTORY:  Chronic CHF  COPD without exacerbation  Atrial fibrillation  Presence of Watchman left atrial appendage closure device  Hypertension  GERD (gastroesophageal reflux disease)  Chronic obstructive pulmonary disease (COPD)  Anemia  Falls  Meningitis  Collapsed lung  Alcohol withdrawal  Emphysema of lung  Cirrhosis  CHF (congestive heart failure)  Poor historian  Alcohol abuse  Chronic atrial fibrillation  Unilateral amputation of lower extremity below knee  Presence of Watchman left atrial appendage closure device  S/P BKA (below knee amputation) unilateral, left  admitted with complaints of Patient is a 65y old  Male who presents with a chief complaint of acute hypoxemic, hypercapneic resp failure  COPD exacerbation (14 May 2020 10:16)  .  64 y/o M with history of hypercapnic respiratory failure Trach/PEG in 2015 and 2017,  now intubated from hypercapnic respiratory failure , COVID neg consulted for Trach/PEG    Plan   Recommend stop plavix and ASA for Trach , lovenox 40 OK to cont  Plan for Trach tomorrow in OR  NPO p MN  T and S and coags ordered  repeat COVID pending  cont medical mgmt as per CCU    Discussed with Cardiothoracic Team at AM rounds.

## 2020-05-14 NOTE — PROGRESS NOTE ADULT - ASSESSMENT
A/P: 65 male with acute respiratory failure from altered awareness and sedation  COPD  AFIB s/p watchman  CHF  HTN  ETOH abuse    Plan:   PULM: Failed weaning again today--For Trach on 5/15    Cardio: Off ASA and plavix for Trach    Renal: Cr. Stable    GI: Feeds, PPI--Npoafter MN    ID: Off Antibiotics    PSY: Thiamine for chronic ETOH abuse and thiamine deficiency folate    Lovenox DVT Prophylaxis    D/W the patients daughter

## 2020-05-14 NOTE — PROVIDER CONTACT NOTE (CHANGE IN STATUS NOTIFICATION) - BACKGROUND
Pt intubated/sedated, tolerating ventilator well at this time, on Propofol. BP borderline at times, receiving midodrine Q 8. Pt in Afib and HR has been

## 2020-05-15 ENCOUNTER — APPOINTMENT (OUTPATIENT)
Dept: THORACIC SURGERY | Facility: HOSPITAL | Age: 65
End: 2020-05-15
Payer: MEDICARE

## 2020-05-15 LAB
ALBUMIN SERPL ELPH-MCNC: 1.9 G/DL — LOW (ref 3.3–5)
ALP SERPL-CCNC: 163 U/L — HIGH (ref 40–120)
ALT FLD-CCNC: 55 U/L — SIGNIFICANT CHANGE UP (ref 12–78)
ANION GAP SERPL CALC-SCNC: 6 MMOL/L — SIGNIFICANT CHANGE UP (ref 5–17)
AST SERPL-CCNC: 42 U/L — HIGH (ref 15–37)
BILIRUB SERPL-MCNC: 1.2 MG/DL — SIGNIFICANT CHANGE UP (ref 0.2–1.2)
BUN SERPL-MCNC: 18 MG/DL — SIGNIFICANT CHANGE UP (ref 7–23)
CALCIUM SERPL-MCNC: 8.7 MG/DL — SIGNIFICANT CHANGE UP (ref 8.5–10.1)
CHLORIDE SERPL-SCNC: 103 MMOL/L — SIGNIFICANT CHANGE UP (ref 96–108)
CO2 SERPL-SCNC: 29 MMOL/L — SIGNIFICANT CHANGE UP (ref 22–31)
CREAT SERPL-MCNC: 0.57 MG/DL — SIGNIFICANT CHANGE UP (ref 0.5–1.3)
GLUCOSE SERPL-MCNC: 99 MG/DL — SIGNIFICANT CHANGE UP (ref 70–99)
HCT VFR BLD CALC: 34.7 % — LOW (ref 39–50)
HGB BLD-MCNC: 11 G/DL — LOW (ref 13–17)
MAGNESIUM SERPL-MCNC: 1.8 MG/DL — SIGNIFICANT CHANGE UP (ref 1.6–2.6)
MCHC RBC-ENTMCNC: 31.2 PG — SIGNIFICANT CHANGE UP (ref 27–34)
MCHC RBC-ENTMCNC: 31.7 GM/DL — LOW (ref 32–36)
MCV RBC AUTO: 98.3 FL — SIGNIFICANT CHANGE UP (ref 80–100)
PHOSPHATE SERPL-MCNC: 3.6 MG/DL — SIGNIFICANT CHANGE UP (ref 2.5–4.5)
PLATELET # BLD AUTO: 258 K/UL — SIGNIFICANT CHANGE UP (ref 150–400)
POTASSIUM SERPL-MCNC: 3.8 MMOL/L — SIGNIFICANT CHANGE UP (ref 3.5–5.3)
POTASSIUM SERPL-SCNC: 3.8 MMOL/L — SIGNIFICANT CHANGE UP (ref 3.5–5.3)
PROT SERPL-MCNC: 6.9 GM/DL — SIGNIFICANT CHANGE UP (ref 6–8.3)
RBC # BLD: 3.53 M/UL — LOW (ref 4.2–5.8)
RBC # FLD: 13.1 % — SIGNIFICANT CHANGE UP (ref 10.3–14.5)
SODIUM SERPL-SCNC: 138 MMOL/L — SIGNIFICANT CHANGE UP (ref 135–145)
WBC # BLD: 3.69 K/UL — LOW (ref 3.8–10.5)
WBC # FLD AUTO: 3.69 K/UL — LOW (ref 3.8–10.5)

## 2020-05-15 PROCEDURE — 99291 CRITICAL CARE FIRST HOUR: CPT

## 2020-05-15 PROCEDURE — 43246 EGD PLACE GASTROSTOMY TUBE: CPT | Mod: 80

## 2020-05-15 PROCEDURE — 43246 EGD PLACE GASTROSTOMY TUBE: CPT

## 2020-05-15 PROCEDURE — ZZZZZ: CPT

## 2020-05-15 PROCEDURE — 71045 X-RAY EXAM CHEST 1 VIEW: CPT | Mod: 26

## 2020-05-15 PROCEDURE — 99232 SBSQ HOSP IP/OBS MODERATE 35: CPT

## 2020-05-15 RX ORDER — THIAMINE MONONITRATE (VIT B1) 100 MG
100 TABLET ORAL DAILY
Refills: 0 | Status: DISCONTINUED | OUTPATIENT
Start: 2020-05-15 | End: 2020-07-08

## 2020-05-15 RX ORDER — DILTIAZEM HCL 120 MG
30 CAPSULE, EXT RELEASE 24 HR ORAL EVERY 6 HOURS
Refills: 0 | Status: DISCONTINUED | OUTPATIENT
Start: 2020-05-15 | End: 2020-05-20

## 2020-05-15 RX ORDER — THIAMINE MONONITRATE (VIT B1) 100 MG
100 TABLET ORAL DAILY
Refills: 0 | Status: DISCONTINUED | OUTPATIENT
Start: 2020-05-15 | End: 2020-05-15

## 2020-05-15 RX ORDER — MIDODRINE HYDROCHLORIDE 2.5 MG/1
5 TABLET ORAL EVERY 8 HOURS
Refills: 0 | Status: DISCONTINUED | OUTPATIENT
Start: 2020-05-15 | End: 2020-05-17

## 2020-05-15 RX ADMIN — PROPOFOL 14.1 MICROGRAM(S)/KG/MIN: 10 INJECTION, EMULSION INTRAVENOUS at 15:16

## 2020-05-15 RX ADMIN — MIDODRINE HYDROCHLORIDE 5 MILLIGRAM(S): 2.5 TABLET ORAL at 21:06

## 2020-05-15 RX ADMIN — CHLORHEXIDINE GLUCONATE 15 MILLILITER(S): 213 SOLUTION TOPICAL at 17:10

## 2020-05-15 RX ADMIN — PROPOFOL 14.1 MICROGRAM(S)/KG/MIN: 10 INJECTION, EMULSION INTRAVENOUS at 18:37

## 2020-05-15 RX ADMIN — PANTOPRAZOLE SODIUM 40 MILLIGRAM(S): 20 TABLET, DELAYED RELEASE ORAL at 12:13

## 2020-05-15 RX ADMIN — PROPOFOL 14.1 MICROGRAM(S)/KG/MIN: 10 INJECTION, EMULSION INTRAVENOUS at 23:00

## 2020-05-15 RX ADMIN — GABAPENTIN 400 MILLIGRAM(S): 400 CAPSULE ORAL at 21:06

## 2020-05-15 RX ADMIN — PROPOFOL 14.1 MICROGRAM(S)/KG/MIN: 10 INJECTION, EMULSION INTRAVENOUS at 00:34

## 2020-05-15 RX ADMIN — PROPOFOL 14.1 MICROGRAM(S)/KG/MIN: 10 INJECTION, EMULSION INTRAVENOUS at 12:14

## 2020-05-15 RX ADMIN — Medication 100 MILLIGRAM(S): at 14:57

## 2020-05-15 RX ADMIN — CHLORHEXIDINE GLUCONATE 15 MILLILITER(S): 213 SOLUTION TOPICAL at 06:43

## 2020-05-15 RX ADMIN — Medication 30 MILLIGRAM(S): at 14:56

## 2020-05-15 RX ADMIN — GABAPENTIN 400 MILLIGRAM(S): 400 CAPSULE ORAL at 14:56

## 2020-05-15 RX ADMIN — Medication 30 MILLIGRAM(S): at 21:06

## 2020-05-15 RX ADMIN — PROPOFOL 14.1 MICROGRAM(S)/KG/MIN: 10 INJECTION, EMULSION INTRAVENOUS at 09:00

## 2020-05-15 RX ADMIN — BUDESONIDE AND FORMOTEROL FUMARATE DIHYDRATE 2 PUFF(S): 160; 4.5 AEROSOL RESPIRATORY (INHALATION) at 08:45

## 2020-05-15 RX ADMIN — Medication 1 MILLIGRAM(S): at 01:26

## 2020-05-15 RX ADMIN — MIDODRINE HYDROCHLORIDE 5 MILLIGRAM(S): 2.5 TABLET ORAL at 14:57

## 2020-05-15 RX ADMIN — PROPOFOL 14.1 MICROGRAM(S)/KG/MIN: 10 INJECTION, EMULSION INTRAVENOUS at 05:10

## 2020-05-15 RX ADMIN — Medication 5 MG/HR: at 06:43

## 2020-05-15 NOTE — PROGRESS NOTE ADULT - SUBJECTIVE AND OBJECTIVE BOX
Patient is a 65y old  Male who presents with a chief complaint of acute hypoxemic, hypercapneic resp failure  COPD exacerbation (14 May 2020 13:05)      BRIEF HOSPITAL COURSE:  65M with PMHx of EtOH abuse/withdrawal, cAF sp Watchman, HTN, CVA, smoker, COPD, HFpEF, GERD, cirrhosis, LBKA  admitted on 4/18/2020 with  acute on chronic hypercapnic & hypoxemic respiratory failure due to exacerbation of COPD and/or bronchitis. Course complicated by EtOH withdrawal, MRSA and CRP PNA.     Events last 24 hours: Remains sedated on propofol, no pressors, afebrile, continues to have intermittent RVR with cAF.     PAST MEDICAL & SURGICAL HISTORY:  Chronic CHF  COPD without exacerbation  Atrial fibrillation  Presence of Watchman left atrial appendage closure device  Hypertension  GERD (gastroesophageal reflux disease)  Chronic obstructive pulmonary disease (COPD)  Anemia  Falls  Meningitis  Collapsed lung  Alcohol withdrawal  Emphysema of lung  Cirrhosis  CHF (congestive heart failure)  Poor historian  Alcohol abuse  Chronic atrial fibrillation  Unilateral amputation of lower extremity below knee  Presence of Watchman left atrial appendage closure device  S/P BKA (below knee amputation) unilateral, left        Hosp day #27d    Vent day # 27d    Mode: AC/ CMV (Assist Control/ Continuous Mandatory Ventilation)  RR (machine): 12  TV (machine): 550  FiO2: 30  PEEP: 8  ITime: 1  MAP: 14  PIP: 39      ICU Vital Signs Last 24 Hrs  T(C): 36.9 (15 May 2020 00:00), Max: 37.9 (14 May 2020 04:00)  T(F): 98.5 (15 May 2020 00:00), Max: 100.2 (14 May 2020 04:00)  HR: 122 (15 May 2020 02:00) (91 - 138)  BP: 97/63 (15 May 2020 02:00) (85/48 - 124/72)  BP(mean): 71 (15 May 2020 02:00) (57 - 76)  ABP: --  ABP(mean): --  RR: 14 (15 May 2020 02:00) (12 - 24)  SpO2: 96% (15 May 2020 02:00) (94% - 100%)      Physical Examination:    General: sedated on vent, obese    HEENT: Pupils equal, reactive to light. Symmetric, sclera aniceteric    PULM: course rhonchi bilaterally    CVS: AFib intermittent RVR, S1/S2    ABD: +BS, soft, mild distention    EXT: Mild generalized anasarca. LLE BKA    SKIN: Warm and well perfused    NEURO: RASS -1    Labs:                        11.0   4.84  )-----------( 236      ( 14 May 2020 06:41 )             34.2     05-14    142  |  105  |  20  ----------------------------<  113<H>  3.3<L>   |  32<H>  |  0.61    Ca    8.6      14 May 2020 06:41  Phos  3.7     05-14  Mg     1.9     05-14    TPro  6.4  /  Alb  1.8<L>  /  TBili  1.5<H>  /  DBili  x   /  AST  35  /  ALT  57  /  AlkPhos  161<H>  05-14    Radiology:  EXAM:  XR CHEST PORTABLE IMMED 1V                            PROCEDURE DATE:  05/12/2020          INTERPRETATION:  RADIOGRAPH OF THE CHEST    Clinical Indication: Nasogastric tube placement.    Technique: Single frontal radiograph of the chest    Comparison: Prior study from 5/7/2020.   CT of the chest from 4/19/2020.      Findings:  Tubes and lines: The nasogastric tube is malpositioned, coiled at the mid esophagus and extends superiorly; the tib is excluded from the field of view.   The endotracheal tube is again seen with the tip terminating approximately 5.5 cm above the regina.      Lungs and pleura:  There is a small right pleural effusion and adjacent atelectasis/consolidation, persistent but overall appears improved compared to the prior study.  Question mild pulmonary vascular congestion.  There is no pneumothorax.    Heart and mediastinum: The cardiomediastinal silhouette is stably enlarged.  Left atrial device again seen.   The aorta is calcified.      Bones and soft tissue: There are no obvious acute osseous or soft tissue abnormalities.    IMPRESSION:   Malpositioned nasogastric tube.  Recommend repositioning.    Right pleural effusion and adjacent atelectasis/consolidation, persistent but overall appears improved compared to the prior study.       Question mild pulmonary vascular congestion.      Stable cardiomegaly. Patient is a 65y old  Male who presents with a chief complaint of acute hypoxemic, hypercapneic resp failure  COPD exacerbation (14 May 2020 13:05)      BRIEF HOSPITAL COURSE:  65M with PMHx of EtOH abuse/withdrawal, cAF sp Watchman, HTN, CVA, smoker, COPD, HFpEF, GERD, cirrhosis, LBKA  admitted on 4/18/2020 with  acute on chronic hypercapnic & hypoxemic respiratory failure due to exacerbation of COPD and/or bronchitis. Course complicated by EtOH withdrawal, MRSA and CRP PNA.     Events last 24 hours: Remains sedated on propofol, no pressors, afebrile, continues to have intermittent RVR with cAF.     PAST MEDICAL & SURGICAL HISTORY:  Chronic CHF  COPD without exacerbation  Atrial fibrillation  Presence of Watchman left atrial appendage closure device  Hypertension  GERD (gastroesophageal reflux disease)  Chronic obstructive pulmonary disease (COPD)  Anemia  Falls  Meningitis  Collapsed lung  Alcohol withdrawal  Emphysema of lung  Cirrhosis  CHF (congestive heart failure)  Poor historian  Alcohol abuse  Chronic atrial fibrillation  Unilateral amputation of lower extremity below knee  Presence of Watchman left atrial appendage closure device  S/P BKA (below knee amputation) unilateral, left        REVIEW OF SYSTEMS  unable to obtain, pt sedated and intubated      Hosp day #27d    Vent day # 27d    Mode: AC/ CMV (Assist Control/ Continuous Mandatory Ventilation)  RR (machine): 12  TV (machine): 550  FiO2: 30  PEEP: 8  ITime: 1  MAP: 14  PIP: 39      ICU Vital Signs Last 24 Hrs  T(C): 36.9 (15 May 2020 00:00), Max: 37.9 (14 May 2020 04:00)  T(F): 98.5 (15 May 2020 00:00), Max: 100.2 (14 May 2020 04:00)  HR: 122 (15 May 2020 02:00) (91 - 138)  BP: 97/63 (15 May 2020 02:00) (85/48 - 124/72)  BP(mean): 71 (15 May 2020 02:00) (57 - 76)  ABP: --  ABP(mean): --  RR: 14 (15 May 2020 02:00) (12 - 24)  SpO2: 96% (15 May 2020 02:00) (94% - 100%)      Physical Examination:    General: sedated on vent, obese    HEENT: Pupils equal, reactive to light. Symmetric, sclera aniceteric    PULM: course rhonchi bilaterally    CVS: AFib intermittent RVR, S1/S2    ABD: +BS, soft, mild distention    EXT: Mild generalized anasarca. LLE BKA    SKIN: Warm and well perfused    NEURO: RASS -1    Labs:                        11.0   4.84  )-----------( 236      ( 14 May 2020 06:41 )             34.2     05-14    142  |  105  |  20  ----------------------------<  113<H>  3.3<L>   |  32<H>  |  0.61    Ca    8.6      14 May 2020 06:41  Phos  3.7     05-14  Mg     1.9     05-14    TPro  6.4  /  Alb  1.8<L>  /  TBili  1.5<H>  /  DBili  x   /  AST  35  /  ALT  57  /  AlkPhos  161<H>  05-14    Radiology:  EXAM:  XR CHEST PORTABLE IMMED 1V                            PROCEDURE DATE:  05/12/2020          INTERPRETATION:  RADIOGRAPH OF THE CHEST    Clinical Indication: Nasogastric tube placement.    Technique: Single frontal radiograph of the chest    Comparison: Prior study from 5/7/2020.   CT of the chest from 4/19/2020.      Findings:  Tubes and lines: The nasogastric tube is malpositioned, coiled at the mid esophagus and extends superiorly; the tib is excluded from the field of view.   The endotracheal tube is again seen with the tip terminating approximately 5.5 cm above the regina.      Lungs and pleura:  There is a small right pleural effusion and adjacent atelectasis/consolidation, persistent but overall appears improved compared to the prior study.  Question mild pulmonary vascular congestion.  There is no pneumothorax.    Heart and mediastinum: The cardiomediastinal silhouette is stably enlarged.  Left atrial device again seen.   The aorta is calcified.      Bones and soft tissue: There are no obvious acute osseous or soft tissue abnormalities.    IMPRESSION:   Malpositioned nasogastric tube.  Recommend repositioning.    Right pleural effusion and adjacent atelectasis/consolidation, persistent but overall appears improved compared to the prior study.       Question mild pulmonary vascular congestion.      Stable cardiomegaly.

## 2020-05-15 NOTE — PROGRESS NOTE ADULT - ASSESSMENT
Impression:  1. acute hypercarbic respiratory failure  2. acute COPD exacerbation  3. MRSA and CRP PNA- completed course of abx   4. hypokalemia -repleted  5. chronic atrial fibrillation with RVR  6. EtOH abuse    Plan:    Neuro - Sedation to facilitate safe ventilation, pt has bitten through ETT prior, daily thiamine    CV -  addition of diltiazem infusion with titration to achieve HR<110 given NPO status          transition to PO following OR tomorrow          keep K~4 and Mg>2 for optimal arrhythmia control    Pulm -  full vent support overnight with LTV cc/kg IDW             active titration of FiO2 to achieve sats>88%, would avoid hyperoxia in this pt population as it will likely worsen CO2 retention             for Trach in am             Vent bundle in place and reviewed     GI -  PPI  Enteric feeds on hold for OR    Renal - Even to negative fluid balance as tolerated by hemodynamics, Cr stable, K repleted, repeat BMP in am.       Heme -  Pharmacologic DVT PPx  in addition to SCD's    ID - Completed course abx, currently off, WBC normal, afebrile. Further Abx addition based on clinical features, culture data, and judicious procalcitonin monitoring.      Endo -  Aggressive glycemic control to limit FS glucose to < 180mg/dl, stable      Goals of care considerations:  Ongoing assessment for patient specific treatment options based on progression or decline.

## 2020-05-15 NOTE — PROGRESS NOTE ADULT - ASSESSMENT
PROBLEMS;    Ac on chronic hypercapnic & hypoxamic respiratory failure-s/p extubation-worsening ventilation  sputum-Few Pseudomonas aeruginosa (Carbapenem Resistant)/Moderate Methicillin resistant Staphylococcus aureus  afib with RVR  OHS/VIJAY  AC flare of COPD/chronic vs acute on chronic bronchitis  Mild interstitial lung disease-NSIP h/o smoking  COVID negativex2  Pulmonary hypertension  Nonobstructing stone in the left ureterovesicular junction/ nonobstructing stone in the lower pole right kidney.  Subacute hemorrhage in the right rectus abdominis along the anterior sheath, measures 1.6 cm in thickness and extends from the umbilicus to the inferior myotendinous junction  Cirrhosis  Mild splenomegaly-portal venous hypertension.  Chronic afib w Watchman device  CHF  BPH  GERD  ETOH abuse  seizures disorder    PLAN;    vent support-pat multiple failed weaning attempts- trach today  iv percedex  aerosols  supportive care  covid repeat testing-neg  d/w staff

## 2020-05-15 NOTE — PROGRESS NOTE ADULT - ASSESSMENT
IMP:    64 y/o male w chronic AFib w Watchman device, CHF, COPD, HTN, ETOH and pt of size  came to ED w EMS c/o SOB since the morning of 04-18--Intubated on 4/23 for AMS and hypoxia.  brain CT and CTA negative for acute findings   POD # 0 S/P Trach and PEG    High risk for acute decompensation and deterioration immediate post op    Plan:    Full vent support--will likely need trach to be changed.  Increase RR 15 and decrease   SBT over next 24 hrs  NPO except for meds--can start using PEG in 24 hrs  Change Dilt gtt to PO 30 q6  Decrease Midodrine to 5 q8  HOB > 30  Hold LMWH at 1200 today--resume tomorrow

## 2020-05-15 NOTE — BRIEF OPERATIVE NOTE - NSICDXBRIEFPROCEDURE_GEN_ALL_CORE_FT
PROCEDURES:  Bronchoscopy with creation of tracheostomy and insertion of percutaneous endoscopic gastrostomy (PEG) tube 15-May-2020 13:14:17 EGD Mary Arciniega

## 2020-05-15 NOTE — PROGRESS NOTE ADULT - SUBJECTIVE AND OBJECTIVE BOX
CC:  Resp failure     HPI:    66 y/o male w chronic AFib w Watchman device, CHF, COPD, HTN, ETOH and pt of size  came to ED w EMS c/o SOB since the morning of 04-18--Intubated on 4/23 for AMS and hypoxia.  brain CT and CTA negative for acute findings     5/15:  S/P T+P POD # 0.  Cuff leak.  On Dilt gtt for rate control    PMH:  As above.     PSH:  As above.     FH: Non Contributory other than those listed in HPI    Social History:  Unobtainable due to clinical condition     MEDICATIONS  (STANDING):  ALBUTerol    90 MICROgram(s) HFA Inhaler 2 Puff(s) Inhalation every 4 hours  budesonide 160 MICROgram(s)/formoterol 4.5 MICROgram(s) Inhaler 2 Puff(s) Inhalation two times a day  chlorhexidine 0.12% Liquid 15 milliLiter(s) Oral Mucosa every 12 hours  diltiazem    Tablet 30 milliGRAM(s) Oral every 6 hours  enoxaparin Injectable 40 milliGRAM(s) SubCutaneous daily  gabapentin 400 milliGRAM(s) Oral three times a day  midodrine 5 milliGRAM(s) Oral every 8 hours  pantoprazole  Injectable 40 milliGRAM(s) IV Push daily  propofol Infusion 20 MICROgram(s)/kG/Min (14.1 mL/Hr) IV Continuous <Continuous>  senna 2 Tablet(s) Oral at bedtime  thiamine 100 milliGRAM(s) Oral daily  tiotropium 18 MICROgram(s) Capsule 1 Capsule(s) Inhalation daily    MEDICATIONS  (PRN):  acetaminophen   Tablet .. 650 milliGRAM(s) Oral every 6 hours PRN Temp greater or equal to 38.5C (101.3F)  LORazepam   Injectable 1 milliGRAM(s) IV Push every 4 hours PRN Anxiety  metoclopramide Injectable 10 milliGRAM(s) IV Push every 8 hours PRN Vomiting  polyethylene glycol 3350 17 Gram(s) Oral daily PRN Constipation      Allergies: NKDA    ROS:  SEE BELOW        ICU Vital Signs Last 24 Hrs  T(C): 36.6 (15 May 2020 08:13), Max: 37.3 (15 May 2020 05:00)  T(F): 97.8 (15 May 2020 08:13), Max: 99.2 (15 May 2020 05:00)  HR: 94 (15 May 2020 11:00) (91 - 131)  BP: 96/67 (15 May 2020 11:00) (91/54 - 124/72)  BP(mean): 73 (15 May 2020 11:00) (62 - 81)  ABP: --  ABP(mean): --  RR: 12 (15 May 2020 11:00) (12 - 27)  SpO2: 97% (15 May 2020 11:00) (94% - 100%)      Mode: AC/ CMV (Assist Control/ Continuous Mandatory Ventilation)  RR (machine): 12  TV (machine): 550  FiO2: 40  PEEP: 8  ITime: 1  MAP: 17  PIP: 29      I&O's Summary    14 May 2020 07:01  -  15 May 2020 07:00  --------------------------------------------------------  IN: 1409 mL / OUT: 500 mL / NET: 909 mL        Physical Exam:  SEE BELOW                          11.0   3.69  )-----------( 258      ( 15 May 2020 06:43 )             34.7       05-15    138  |  103  |  18  ----------------------------<  99  3.8   |  29  |  0.57    Ca    8.7      15 May 2020 06:43  Phos  3.6     05-15  Mg     1.8     05-15    TPro  6.9  /  Alb  1.9<L>  /  TBili  1.2  /  DBili  x   /  AST  42<H>  /  ALT  55  /  AlkPhos  163<H>  05-15                    DVT Prophylaxis:                                                            Contraindication:     Advanced Directives:    Discussed with:    Visit Information:  Time spent excluding procedure:  45 cc mins    ** Time is exclusive of billed procedures and/or teaching and/or routine family updates.

## 2020-05-15 NOTE — PROGRESS NOTE ADULT - SUBJECTIVE AND OBJECTIVE BOX
Subjective:     pat for trach today, no new complaint.    Home Medications:  albuterol 2.5 mg/3 mL (0.083%) inhalation solution: 3 milliliter(s) inhaled every 6 hours, As Needed -for shortness of breath and/or wheezing (19 Apr 2020 03:12)  gabapentin 400 mg oral capsule: 1 cap(s) orally 3 times a day (19 Apr 2020 03:12)  pantoprazole 40 mg oral granule, enteric coated: 40 milligram(s) orally once a day (19 Apr 2020 03:12)  Spiriva 18 mcg inhalation capsule: 1 cap(s) inhaled once a day (19 Apr 2020 03:12)  tamsulosin 0.4 mg oral capsule: 1 cap(s) orally once a day (at bedtime) (19 Apr 2020 03:12)  traZODone 50 mg oral tablet: 2 tab(s) orally once a day (at bedtime), As Needed (19 Apr 2020 03:12)    MEDICATIONS  (STANDING):  ALBUTerol    90 MICROgram(s) HFA Inhaler 2 Puff(s) Inhalation every 4 hours  budesonide 160 MICROgram(s)/formoterol 4.5 MICROgram(s) Inhaler 2 Puff(s) Inhalation two times a day  chlorhexidine 0.12% Liquid 15 milliLiter(s) Oral Mucosa every 12 hours  diltiazem    Tablet 30 milliGRAM(s) Oral every 6 hours  enoxaparin Injectable 40 milliGRAM(s) SubCutaneous daily  gabapentin 400 milliGRAM(s) Oral three times a day  midodrine 5 milliGRAM(s) Oral every 8 hours  pantoprazole  Injectable 40 milliGRAM(s) IV Push daily  propofol Infusion 20 MICROgram(s)/kG/Min (14.1 mL/Hr) IV Continuous <Continuous>  senna 2 Tablet(s) Oral at bedtime  thiamine 100 milliGRAM(s) Oral daily  tiotropium 18 MICROgram(s) Capsule 1 Capsule(s) Inhalation daily    MEDICATIONS  (PRN):  acetaminophen   Tablet .. 650 milliGRAM(s) Oral every 6 hours PRN Temp greater or equal to 38.5C (101.3F)  LORazepam   Injectable 1 milliGRAM(s) IV Push every 4 hours PRN Anxiety  metoclopramide Injectable 10 milliGRAM(s) IV Push every 8 hours PRN Vomiting  polyethylene glycol 3350 17 Gram(s) Oral daily PRN Constipation      Allergies    Ceclor (Rash)  Duricef (Rash)    Intolerances        Vital Signs Last 24 Hrs  T(C): 36.6 (15 May 2020 08:13), Max: 37.3 (15 May 2020 05:00)  T(F): 97.8 (15 May 2020 08:13), Max: 99.2 (15 May 2020 05:00)  HR: 75 (15 May 2020 14:00) (75 - 131)  BP: 104/61 (15 May 2020 14:00) (91/54 - 124/72)  BP(mean): 69 (15 May 2020 14:00) (62 - 81)  RR: 15 (15 May 2020 14:00) (12 - 27)  SpO2: 100% (15 May 2020 14:00) (94% - 100%)  Mode: AC/ CMV (Assist Control/ Continuous Mandatory Ventilation)  RR (machine): 15  TV (machine): 450  FiO2: 40  PEEP: 5  ITime: 1  MAP: 17  PIP: 37      PHYSICAL EXAMINATION:    NECK:  Supple. No lymphadenopathy. Jugular venous pressure not elevated. Carotids equal.   HEART:   The cardiac impulse has a normal quality. Reg., Nl S1 and S2.  There are no murmurs, rubs or gallops noted  CHEST:  Chest crackles to auscultation. Normal respiratory effort.  ABDOMEN:  Soft and nontender.   EXTREMITIES:  There is no edema.       LABS:                        11.0   3.69  )-----------( 258      ( 15 May 2020 06:43 )             34.7     05-15    138  |  103  |  18  ----------------------------<  99  3.8   |  29  |  0.57    Ca    8.7      15 May 2020 06:43  Phos  3.6     05-15  Mg     1.8     05-15    TPro  6.9  /  Alb  1.9<L>  /  TBili  1.2  /  DBili  x   /  AST  42<H>  /  ALT  55  /  AlkPhos  163<H>  05-15    PT/INR - ( 14 May 2020 12:13 )   PT: 13.5 sec;   INR: 1.21 ratio         PTT - ( 14 May 2020 12:13 )  PTT:31.5 sec

## 2020-05-15 NOTE — CHART NOTE - NSCHARTNOTEFT_GEN_A_CORE
Called by CCU attending for noted low tidal volumes on vent, cuff deflating and not staying inflated when air placed.    Trach changed over suction tubing to a new Portex #7, cuffed, by Dr. Lewis while pt on diprovan and paralytic given. Anesthesia at bedside.   Exchange went well. Trach sutured in place and surgicell applied.   Daughter aware of issue.

## 2020-05-15 NOTE — BRIEF OPERATIVE NOTE - OPERATION/FINDINGS
small amount of incisional bleeding noted, cautery and surgicel placed w resolution  Cuff leak noted initially but improved during case  #7 portex cuffed placed

## 2020-05-15 NOTE — PROGRESS NOTE ADULT - SUBJECTIVE AND OBJECTIVE BOX
Subjective:  Pt seen, sedated on vent. NPO for OR    Vital Signs:  Vital Signs Last 24 Hrs  T(C): 36.6 (05-15-20 @ 08:13), Max: 37.3 (05-15-20 @ 05:00)  T(F): 97.8 (05-15-20 @ 08:13), Max: 99.2 (05-15-20 @ 05:00)  HR: 92 (05-15-20 @ 12:00) (84 - 131)  BP: 107/59 (05-15-20 @ 12:00) (91/54 - 124/72)  RR: 14 (05-15-20 @ 12:00) (12 - 27)  SpO2: 100% (05-15-20 @ 12:00) (94% - 100%) on (O2)    Telemetry/Alarms:    Relevant labs, radiology and Medications reviewed                        11.0   3.69  )-----------( 258      ( 15 May 2020 06:43 )             34.7     05-15    138  |  103  |  18  ----------------------------<  99  3.8   |  29  |  0.57    Ca    8.7      15 May 2020 06:43  Phos  3.6     05-15  Mg     1.8     05-15    TPro  6.9  /  Alb  1.9<L>  /  TBili  1.2  /  DBili  x   /  AST  42<H>  /  ALT  55  /  AlkPhos  163<H>  05-15    PT/INR - ( 14 May 2020 12:13 )   PT: 13.5 sec;   INR: 1.21 ratio         PTT - ( 14 May 2020 12:13 )  PTT:31.5 sec  MEDICATIONS  (STANDING):  ALBUTerol    90 MICROgram(s) HFA Inhaler 2 Puff(s) Inhalation every 4 hours  budesonide 160 MICROgram(s)/formoterol 4.5 MICROgram(s) Inhaler 2 Puff(s) Inhalation two times a day  chlorhexidine 0.12% Liquid 15 milliLiter(s) Oral Mucosa every 12 hours  diltiazem    Tablet 30 milliGRAM(s) Oral every 6 hours  enoxaparin Injectable 40 milliGRAM(s) SubCutaneous daily  gabapentin 400 milliGRAM(s) Oral three times a day  midodrine 5 milliGRAM(s) Oral every 8 hours  pantoprazole  Injectable 40 milliGRAM(s) IV Push daily  propofol Infusion 20 MICROgram(s)/kG/Min (14.1 mL/Hr) IV Continuous <Continuous>  senna 2 Tablet(s) Oral at bedtime  thiamine 100 milliGRAM(s) Oral daily  tiotropium 18 MICROgram(s) Capsule 1 Capsule(s) Inhalation daily    MEDICATIONS  (PRN):  acetaminophen   Tablet .. 650 milliGRAM(s) Oral every 6 hours PRN Temp greater or equal to 38.5C (101.3F)  LORazepam   Injectable 1 milliGRAM(s) IV Push every 4 hours PRN Anxiety  metoclopramide Injectable 10 milliGRAM(s) IV Push every 8 hours PRN Vomiting  polyethylene glycol 3350 17 Gram(s) Oral daily PRN Constipation      Physical exam  Gen sedated on vent  Card RRR  Pulm decreased bases  Abd soft, obese, multiple small areas of ecchymosis   Ext warm, edema      I&O's Summary    14 May 2020 07:01  -  15 May 2020 07:00  --------------------------------------------------------  IN: 1409 mL / OUT: 500 mL / NET: 909 mL        Assessment  65y Male  w/ PAST MEDICAL & SURGICAL HISTORY:  Chronic CHF  COPD without exacerbation  Atrial fibrillation  Presence of Watchman left atrial appendage closure device  Hypertension  GERD (gastroesophageal reflux disease)  Chronic obstructive pulmonary disease (COPD)  Anemia  Falls  Meningitis  Collapsed lung  Alcohol withdrawal  Emphysema of lung  Cirrhosis  CHF (congestive heart failure)  Poor historian  Alcohol abuse  Chronic atrial fibrillation  Unilateral amputation of lower extremity below knee  Presence of Watchman left atrial appendage closure device  S/P BKA (below knee amputation) unilateral, left  admitted with complaints of Patient is a 65y old  Male who presents with a chief complaint of acute hypoxemic, hypercapneic resp failure  COPD exacerbation (15 May 2020 11:57)  .  66 y/o M with history of hypercapnic respiratory failure Trach/PEG in 2015 and 2017,  now intubated from hypercapnic respiratory failure , COVID neg consulted for Trach/PEG    Plan   Recommend stop plavix and ASA for Trach , lovenox 40 OK to cont  Plan for Trach today and PEG  NPO   repeat COVID neg    Discussed with Cardiothoracic Team at AM rounds.

## 2020-05-16 LAB
ALBUMIN SERPL ELPH-MCNC: 1.8 G/DL — LOW (ref 3.3–5)
ALP SERPL-CCNC: 147 U/L — HIGH (ref 40–120)
ALT FLD-CCNC: 45 U/L — SIGNIFICANT CHANGE UP (ref 12–78)
ANION GAP SERPL CALC-SCNC: 6 MMOL/L — SIGNIFICANT CHANGE UP (ref 5–17)
AST SERPL-CCNC: 42 U/L — HIGH (ref 15–37)
BILIRUB SERPL-MCNC: 1.2 MG/DL — SIGNIFICANT CHANGE UP (ref 0.2–1.2)
BUN SERPL-MCNC: 15 MG/DL — SIGNIFICANT CHANGE UP (ref 7–23)
CALCIUM SERPL-MCNC: 8.4 MG/DL — LOW (ref 8.5–10.1)
CHLORIDE SERPL-SCNC: 103 MMOL/L — SIGNIFICANT CHANGE UP (ref 96–108)
CO2 SERPL-SCNC: 30 MMOL/L — SIGNIFICANT CHANGE UP (ref 22–31)
CREAT SERPL-MCNC: 0.54 MG/DL — SIGNIFICANT CHANGE UP (ref 0.5–1.3)
GLUCOSE SERPL-MCNC: 88 MG/DL — SIGNIFICANT CHANGE UP (ref 70–99)
HCT VFR BLD CALC: 32 % — LOW (ref 39–50)
HGB BLD-MCNC: 10.3 G/DL — LOW (ref 13–17)
MAGNESIUM SERPL-MCNC: 1.7 MG/DL — SIGNIFICANT CHANGE UP (ref 1.6–2.6)
MCHC RBC-ENTMCNC: 31.3 PG — SIGNIFICANT CHANGE UP (ref 27–34)
MCHC RBC-ENTMCNC: 32.2 GM/DL — SIGNIFICANT CHANGE UP (ref 32–36)
MCV RBC AUTO: 97.3 FL — SIGNIFICANT CHANGE UP (ref 80–100)
PHOSPHATE SERPL-MCNC: 3.8 MG/DL — SIGNIFICANT CHANGE UP (ref 2.5–4.5)
PLATELET # BLD AUTO: 248 K/UL — SIGNIFICANT CHANGE UP (ref 150–400)
POTASSIUM SERPL-MCNC: 4.2 MMOL/L — SIGNIFICANT CHANGE UP (ref 3.5–5.3)
POTASSIUM SERPL-SCNC: 4.2 MMOL/L — SIGNIFICANT CHANGE UP (ref 3.5–5.3)
PROT SERPL-MCNC: 6.5 GM/DL — SIGNIFICANT CHANGE UP (ref 6–8.3)
RBC # BLD: 3.29 M/UL — LOW (ref 4.2–5.8)
RBC # FLD: 12.9 % — SIGNIFICANT CHANGE UP (ref 10.3–14.5)
SODIUM SERPL-SCNC: 139 MMOL/L — SIGNIFICANT CHANGE UP (ref 135–145)
WBC # BLD: 5.88 K/UL — SIGNIFICANT CHANGE UP (ref 3.8–10.5)
WBC # FLD AUTO: 5.88 K/UL — SIGNIFICANT CHANGE UP (ref 3.8–10.5)

## 2020-05-16 PROCEDURE — 99291 CRITICAL CARE FIRST HOUR: CPT

## 2020-05-16 RX ORDER — DEXMEDETOMIDINE HYDROCHLORIDE IN 0.9% SODIUM CHLORIDE 4 UG/ML
0.7 INJECTION INTRAVENOUS
Qty: 200 | Refills: 0 | Status: DISCONTINUED | OUTPATIENT
Start: 2020-05-16 | End: 2020-05-20

## 2020-05-16 RX ORDER — MAGNESIUM SULFATE 500 MG/ML
2 VIAL (ML) INJECTION ONCE
Refills: 0 | Status: COMPLETED | OUTPATIENT
Start: 2020-05-16 | End: 2020-05-16

## 2020-05-16 RX ORDER — ALPRAZOLAM 0.25 MG
1 TABLET ORAL EVERY 6 HOURS
Refills: 0 | Status: DISCONTINUED | OUTPATIENT
Start: 2020-05-16 | End: 2020-05-21

## 2020-05-16 RX ADMIN — Medication 30 MILLIGRAM(S): at 05:01

## 2020-05-16 RX ADMIN — GABAPENTIN 400 MILLIGRAM(S): 400 CAPSULE ORAL at 15:50

## 2020-05-16 RX ADMIN — GABAPENTIN 400 MILLIGRAM(S): 400 CAPSULE ORAL at 22:12

## 2020-05-16 RX ADMIN — DEXMEDETOMIDINE HYDROCHLORIDE IN 0.9% SODIUM CHLORIDE 20.6 MICROGRAM(S)/KG/HR: 4 INJECTION INTRAVENOUS at 22:14

## 2020-05-16 RX ADMIN — ENOXAPARIN SODIUM 40 MILLIGRAM(S): 100 INJECTION SUBCUTANEOUS at 12:11

## 2020-05-16 RX ADMIN — Medication 1 MILLIGRAM(S): at 03:52

## 2020-05-16 RX ADMIN — Medication 100 MILLIGRAM(S): at 12:13

## 2020-05-16 RX ADMIN — PANTOPRAZOLE SODIUM 40 MILLIGRAM(S): 20 TABLET, DELAYED RELEASE ORAL at 12:11

## 2020-05-16 RX ADMIN — Medication 1 MILLIGRAM(S): at 22:13

## 2020-05-16 RX ADMIN — Medication 30 MILLIGRAM(S): at 23:30

## 2020-05-16 RX ADMIN — MIDODRINE HYDROCHLORIDE 5 MILLIGRAM(S): 2.5 TABLET ORAL at 22:12

## 2020-05-16 RX ADMIN — DEXMEDETOMIDINE HYDROCHLORIDE IN 0.9% SODIUM CHLORIDE 20.6 MICROGRAM(S)/KG/HR: 4 INJECTION INTRAVENOUS at 20:13

## 2020-05-16 RX ADMIN — Medication 30 MILLIGRAM(S): at 12:13

## 2020-05-16 RX ADMIN — MIDODRINE HYDROCHLORIDE 5 MILLIGRAM(S): 2.5 TABLET ORAL at 05:01

## 2020-05-16 RX ADMIN — MIDODRINE HYDROCHLORIDE 5 MILLIGRAM(S): 2.5 TABLET ORAL at 15:50

## 2020-05-16 RX ADMIN — Medication 30 MILLIGRAM(S): at 16:26

## 2020-05-16 RX ADMIN — CHLORHEXIDINE GLUCONATE 15 MILLILITER(S): 213 SOLUTION TOPICAL at 05:02

## 2020-05-16 RX ADMIN — GABAPENTIN 400 MILLIGRAM(S): 400 CAPSULE ORAL at 05:01

## 2020-05-16 RX ADMIN — CHLORHEXIDINE GLUCONATE 15 MILLILITER(S): 213 SOLUTION TOPICAL at 16:26

## 2020-05-16 RX ADMIN — Medication 1 MILLIGRAM(S): at 15:55

## 2020-05-16 RX ADMIN — DEXMEDETOMIDINE HYDROCHLORIDE IN 0.9% SODIUM CHLORIDE 20.6 MICROGRAM(S)/KG/HR: 4 INJECTION INTRAVENOUS at 10:54

## 2020-05-16 RX ADMIN — Medication 50 GRAM(S): at 23:31

## 2020-05-16 RX ADMIN — DEXMEDETOMIDINE HYDROCHLORIDE IN 0.9% SODIUM CHLORIDE 20.6 MICROGRAM(S)/KG/HR: 4 INJECTION INTRAVENOUS at 16:26

## 2020-05-16 RX ADMIN — Medication 30 MILLIGRAM(S): at 01:16

## 2020-05-16 RX ADMIN — SENNA PLUS 2 TABLET(S): 8.6 TABLET ORAL at 22:13

## 2020-05-16 RX ADMIN — Medication 10 MILLIGRAM(S): at 12:13

## 2020-05-16 NOTE — PROGRESS NOTE ADULT - SUBJECTIVE AND OBJECTIVE BOX
BRIEF HOSPITAL COURSE:  65M with PMHx of EtOH abuse/withdrawal, cAF sp Watchman, HTN, CVA, smoker, COPD, HFpEF, GERD, cirrhosis, LBKA  admitted on 4/18/2020 with  acute on chronic hypercapnic & hypoxemic respiratory failure due to exacerbation of COPD and/or bronchitis. Course complicated by EtOH withdrawal, MRSA and CRP PNA.     Events last 24 hours: Remains sedated on propofol, no pressors, afebrile, continues to have intermittent RVR with cAF, sp trach and PEG    PAST MEDICAL & SURGICAL HISTORY:  Chronic CHF  COPD without exacerbation  Atrial fibrillation  Presence of Watchman left atrial appendage closure device  Hypertension  GERD (gastroesophageal reflux disease)  Chronic obstructive pulmonary disease (COPD)  Anemia  Falls  Meningitis  Collapsed lung  Alcohol withdrawal  Emphysema of lung  Cirrhosis  CHF (congestive heart failure)  Poor historian  Alcohol abuse  Chronic atrial fibrillation  Unilateral amputation of lower extremity below knee  Presence of Watchman left atrial appendage closure device  S/P BKA (below knee amputation) unilateral, left        REVIEW OF SYSTEMS  unable to obtain, pt sedated and intubated      Hosp day #28d    Vent day # 28d    Mode: AC/ CMV (Assist Control/ Continuous Mandatory Ventilation)  RR (machine): 12  TV (machine): 550  FiO2: 30  PEEP: 8  ITime: 1  MAP: 14  PIP: 39      ICU Vital Signs Last 24 Hrs  T(C): 36 (15 May 2020 17:00), Max: 37.3 (15 May 2020 05:00)  T(F): 96.8 (15 May 2020 17:00), Max: 99.2 (15 May 2020 05:00)  HR: 109 (16 May 2020 00:00) (70 - 112)  BP: 111/85 (16 May 2020 00:00) (91/63 - 130/64)  BP(mean): 91 (16 May 2020 00:00) (65 - 91)  ABP: --  ABP(mean): --  RR: 16 (16 May 2020 00:00) (12 - 27)  SpO2: 100% (16 May 2020 00:00) (94% - 100%)        Physical Examination:    General: sedated on vent, obese    HEENT: Pupils equal, reactive to light. Symmetric, sclera anicteric, trach with dried blood on surgicel, no active bleeding    PULM: course rhonchi bilaterally    CVS: AFib intermittent RVR, S1/S2    ABD: +BS, soft, mild distention, PEG site without bleeding/swelling, draining gastric fluid    EXT: Mild generalized anasarca. LLE BKA    SKIN: Warm and well perfused    NEURO: RASS -1    Labs:                        11.0   3.69  )-----------( 258      ( 15 May 2020 06:43 )             34.7   05-15    138  |  103  |  18  ----------------------------<  99  3.8   |  29  |  0.57    Ca    8.7      15 May 2020 06:43  Phos  3.6     05-15  Mg     1.8     05-15    TPro  6.9  /  Alb  1.9<L>  /  TBili  1.2  /  DBili  x   /  AST  42<H>  /  ALT  55  /  AlkPhos  163<H>  05-15      Radiology:  EXAM:  XR CHEST PORTABLE URGENT 1V                            PROCEDURE DATE:  05/15/2020          INTERPRETATION:    Examination: XR CHEST URGENT    History: ADMDIAG1: R06.03 ACUTE RESPIRATORY DISTRESS/, s/p trach COVID negative patient.    Findings:  Status post tracheotomy. A tracheostomy tube is now visualized. The endotracheal tube seen on 5/12/2022 removed. The nasogastric tube has been removed. The heart is enlarged, unchanged. The lungs are clear. No hilar or mediastinal abnormality. No evidence of a pleural effusion or pneumothorax.    Impression:  1.  Cardiomegaly. Clear lungs. Status post tracheostomy insertion.      DISCRETE X-RAY DATA:  Percent of LEFT lung opacification: Clear  Percent of RIGHT lung opacification: Clear  Change in lung opacification from most recent x-ray (<=3 days): Stable  Change from prior dated 3 or more days (same admission): Stable                  RAISA MULLER M.D.; ATTENDING RADIOLOGIST  This document has been electronically signed. May 15 2020 12:00PM BRIEF HOSPITAL COURSE:  65M with PMHx of EtOH abuse/withdrawal, cAF sp Watchman, HTN, CVA, smoker, COPD, HFpEF, GERD, cirrhosis, LBKA  admitted on 4/18/2020 with  acute on chronic hypercapnic & hypoxemic respiratory failure due to exacerbation of COPD and/or bronchitis. Course complicated by EtOH withdrawal, MRSA and CRP PNA.     Events last 24 hours: Remains sedated on propofol, no pressors, afebrile,  intermittent RVR with cAF has improved, sp trach and PEG    PAST MEDICAL & SURGICAL HISTORY:  Chronic CHF  COPD without exacerbation  Atrial fibrillation  Presence of Watchman left atrial appendage closure device  Hypertension  GERD (gastroesophageal reflux disease)  Chronic obstructive pulmonary disease (COPD)  Anemia  Falls  Meningitis  Collapsed lung  Alcohol withdrawal  Emphysema of lung  Cirrhosis  CHF (congestive heart failure)  Poor historian  Alcohol abuse  Chronic atrial fibrillation  Unilateral amputation of lower extremity below knee  Presence of Watchman left atrial appendage closure device  S/P BKA (below knee amputation) unilateral, left        REVIEW OF SYSTEMS  unable to obtain, pt sedated and intubated      Hosp day #28d    Vent day # 28d    Mode: AC/ CMV (Assist Control/ Continuous Mandatory Ventilation)  RR (machine): 12  TV (machine): 550  FiO2: 30  PEEP: 8  ITime: 1  MAP: 14  PIP: 39      ICU Vital Signs Last 24 Hrs  T(C): 36 (15 May 2020 17:00), Max: 37.3 (15 May 2020 05:00)  T(F): 96.8 (15 May 2020 17:00), Max: 99.2 (15 May 2020 05:00)  HR: 109 (16 May 2020 00:00) (70 - 112)  BP: 111/85 (16 May 2020 00:00) (91/63 - 130/64)  BP(mean): 91 (16 May 2020 00:00) (65 - 91)  ABP: --  ABP(mean): --  RR: 16 (16 May 2020 00:00) (12 - 27)  SpO2: 100% (16 May 2020 00:00) (94% - 100%)        Physical Examination:    General: sedated on vent, obese    HEENT: Pupils equal, reactive to light. Symmetric, sclera anicteric, trach with dried blood on surgicel, no active bleeding    PULM: course rhonchi bilaterally    CVS: AFib intermittent RVR, S1/S2    ABD: +BS, soft, mild distention, PEG site without bleeding/swelling, draining gastric fluid    EXT: Mild generalized anasarca. LLE BKA    SKIN: Warm and well perfused    NEURO: RASS -1    Labs:                        11.0   3.69  )-----------( 258      ( 15 May 2020 06:43 )             34.7   05-15    138  |  103  |  18  ----------------------------<  99  3.8   |  29  |  0.57    Ca    8.7      15 May 2020 06:43  Phos  3.6     05-15  Mg     1.8     05-15    TPro  6.9  /  Alb  1.9<L>  /  TBili  1.2  /  DBili  x   /  AST  42<H>  /  ALT  55  /  AlkPhos  163<H>  05-15      Radiology:  EXAM:  XR CHEST PORTABLE URGENT 1V                            PROCEDURE DATE:  05/15/2020          INTERPRETATION:    Examination: XR CHEST URGENT    History: ADMDIAG1: R06.03 ACUTE RESPIRATORY DISTRESS/, s/p trach COVID negative patient.    Findings:  Status post tracheotomy. A tracheostomy tube is now visualized. The endotracheal tube seen on 5/12/2022 removed. The nasogastric tube has been removed. The heart is enlarged, unchanged. The lungs are clear. No hilar or mediastinal abnormality. No evidence of a pleural effusion or pneumothorax.    Impression:  1.  Cardiomegaly. Clear lungs. Status post tracheostomy insertion.      DISCRETE X-RAY DATA:  Percent of LEFT lung opacification: Clear  Percent of RIGHT lung opacification: Clear  Change in lung opacification from most recent x-ray (<=3 days): Stable  Change from prior dated 3 or more days (same admission): Stable                  RAISA MULLER M.D.; ATTENDING RADIOLOGIST  This document has been electronically signed. May 15 2020 12:00PM

## 2020-05-16 NOTE — CHART NOTE - NSCHARTNOTEFT_GEN_A_CORE
placed on pressure support 15/5. tolerating well, switched to 10/5 after about 2 hours. RR 24, -600. continue on pressure support for now.

## 2020-05-16 NOTE — PROGRESS NOTE ADULT - SUBJECTIVE AND OBJECTIVE BOX
Subjective:    pat s/p trach, still on vent.    Home Medications:  albuterol 2.5 mg/3 mL (0.083%) inhalation solution: 3 milliliter(s) inhaled every 6 hours, As Needed -for shortness of breath and/or wheezing (19 Apr 2020 03:12)  gabapentin 400 mg oral capsule: 1 cap(s) orally 3 times a day (19 Apr 2020 03:12)  pantoprazole 40 mg oral granule, enteric coated: 40 milligram(s) orally once a day (19 Apr 2020 03:12)  Spiriva 18 mcg inhalation capsule: 1 cap(s) inhaled once a day (19 Apr 2020 03:12)  tamsulosin 0.4 mg oral capsule: 1 cap(s) orally once a day (at bedtime) (19 Apr 2020 03:12)  traZODone 50 mg oral tablet: 2 tab(s) orally once a day (at bedtime), As Needed (19 Apr 2020 03:12)    MEDICATIONS  (STANDING):  ALBUTerol    90 MICROgram(s) HFA Inhaler 2 Puff(s) Inhalation every 4 hours  budesonide 160 MICROgram(s)/formoterol 4.5 MICROgram(s) Inhaler 2 Puff(s) Inhalation two times a day  chlorhexidine 0.12% Liquid 15 milliLiter(s) Oral Mucosa every 12 hours  dexMEDEtomidine Infusion 0.7 MICROgram(s)/kG/Hr (20.6 mL/Hr) IV Continuous <Continuous>  diltiazem    Tablet 30 milliGRAM(s) Oral every 6 hours  enoxaparin Injectable 40 milliGRAM(s) SubCutaneous daily  gabapentin 400 milliGRAM(s) Oral three times a day  midodrine 5 milliGRAM(s) Oral every 8 hours  pantoprazole  Injectable 40 milliGRAM(s) IV Push daily  propofol Infusion 20 MICROgram(s)/kG/Min (14.1 mL/Hr) IV Continuous <Continuous>  senna 2 Tablet(s) Oral at bedtime  thiamine 100 milliGRAM(s) Oral daily  tiotropium 18 MICROgram(s) Capsule 1 Capsule(s) Inhalation daily    MEDICATIONS  (PRN):  acetaminophen   Tablet .. 650 milliGRAM(s) Oral every 6 hours PRN Temp greater or equal to 38.5C (101.3F)  LORazepam   Injectable 1 milliGRAM(s) IV Push every 4 hours PRN Anxiety  metoclopramide Injectable 10 milliGRAM(s) IV Push every 8 hours PRN Vomiting  polyethylene glycol 3350 17 Gram(s) Oral daily PRN Constipation      Allergies    Ceclor (Rash)  Duricef (Rash)    Intolerances        Vital Signs Last 24 Hrs  T(C): 35.9 (16 May 2020 10:25), Max: 36 (15 May 2020 17:00)  T(F): 96.6 (16 May 2020 10:25), Max: 96.8 (15 May 2020 17:00)  HR: 69 (16 May 2020 08:53) (69 - 109)  BP: 109/58 (16 May 2020 06:00) (97/60 - 130/64)  BP(mean): 71 (16 May 2020 06:00) (66 - 91)  RR: 18 (16 May 2020 06:00) (15 - 26)  SpO2: 100% (16 May 2020 08:53) (90% - 100%)  Mode: AC/ CMV (Assist Control/ Continuous Mandatory Ventilation)  RR (machine): 15  TV (machine): 450  FiO2: 40  PEEP: 5  ITime: 1  MAP: 9.8  PIP: 21      PHYSICAL EXAMINATION:    NECK:  Supple. No lymphadenopathy. Jugular venous pressure not elevated. Carotids equal.   HEART:   The cardiac impulse has a normal quality. Reg., Nl S1 and S2.  There are no murmurs, rubs or gallops noted  CHEST:  Chest is clear to auscultation. Normal respiratory effort.  ABDOMEN:  Soft and nontender.   EXTREMITIES:  There is no edema.       LABS:                        10.3   5.88  )-----------( 248      ( 16 May 2020 06:38 )             32.0     05-16    139  |  103  |  15  ----------------------------<  88  4.2   |  30  |  0.54    Ca    8.4<L>      16 May 2020 06:38  Phos  3.8     05-16  Mg     1.7     05-16    TPro  6.5  /  Alb  1.8<L>  /  TBili  1.2  /  DBili  x   /  AST  42<H>  /  ALT  45  /  AlkPhos  147<H>  05-16

## 2020-05-16 NOTE — PROGRESS NOTE ADULT - ASSESSMENT
Impression:  1. acute hypercarbic respiratory failure with failure to wean from vent- sp trach  2. acute COPD exacerbation  3. MRSA and CRP PNA- completed course of abx   4. chronic atrial fibrillation with RVR  5. EtOH abuse    Plan:    Neuro - Sedation to facilitate safe ventilation, pt has bitten through ETT prior, daily thiamine    CV -  cont dilitiazem at current dosing, rates remain mostly <110          keep K~4 and Mg>2 for optimal arrhythmia control          sp Watchman    Pulm -  full vent support overnight with LTV cc/kg IDW             active titration of FiO2 to achieve sats>88%, would avoid hyperoxia in this pt population as it will likely worsen CO2 retention             PS weaning in am with transition to precedex             Vent bundle in place and reviewed     GI -  PPI  Enteric feeds to start at 24hours as per thoracic surgery    Renal - Even to negative fluid balance as tolerated by hemodynamics, Cr stable, K repleted, repeat BMP in am.       Heme -  Pharmacologic DVT PPx  in addition to SCD's    ID - Completed course abx, currently off, WBC normal, afebrile. Further Abx addition based on clinical features, culture data, and judicious procalcitonin monitoring.      Endo -  Aggressive glycemic control to limit FS glucose to < 180mg/dl, stable      Goals of care considerations:  Ongoing assessment for patient specific treatment options based on progression or decline.

## 2020-05-16 NOTE — PROGRESS NOTE ADULT - ASSESSMENT
PROBLEMS;    Ac on chronic hypercapnic & hypoxamic respiratory failure-s/p trach  sputum-Few Pseudomonas aeruginosa (Carbapenem Resistant)/Moderate Methicillin resistant Staphylococcus aureus  afib with RVR  OHS/VIJAY  AC flare of COPD/chronic vs acute on chronic bronchitis  Mild interstitial lung disease-NSIP h/o smoking  COVID negativex2  Pulmonary hypertension  Nonobstructing stone in the left ureterovesicular junction/ nonobstructing stone in the lower pole right kidney.  Subacute hemorrhage in the right rectus abdominis along the anterior sheath, measures 1.6 cm in thickness and extends from the umbilicus to the inferior myotendinous junction  Cirrhosis  Mild splenomegaly-portal venous hypertension.  Chronic afib w Watchman device  CHF  BPH  GERD  ETOH abuse  seizures disorder    PLAN;    vent support-pat multiple failed weaning attempts- s/p trach weaning as tolerated  iv percedex  aerosols  supportive care  covid repeat testing-neg  d/w staff

## 2020-05-17 LAB
ALBUMIN SERPL ELPH-MCNC: 1.9 G/DL — LOW (ref 3.3–5)
ALP SERPL-CCNC: 143 U/L — HIGH (ref 40–120)
ALT FLD-CCNC: 35 U/L — SIGNIFICANT CHANGE UP (ref 12–78)
ANION GAP SERPL CALC-SCNC: 8 MMOL/L — SIGNIFICANT CHANGE UP (ref 5–17)
AST SERPL-CCNC: 24 U/L — SIGNIFICANT CHANGE UP (ref 15–37)
BILIRUB SERPL-MCNC: 1.5 MG/DL — HIGH (ref 0.2–1.2)
BUN SERPL-MCNC: 12 MG/DL — SIGNIFICANT CHANGE UP (ref 7–23)
CALCIUM SERPL-MCNC: 8.7 MG/DL — SIGNIFICANT CHANGE UP (ref 8.5–10.1)
CHLORIDE SERPL-SCNC: 105 MMOL/L — SIGNIFICANT CHANGE UP (ref 96–108)
CO2 SERPL-SCNC: 27 MMOL/L — SIGNIFICANT CHANGE UP (ref 22–31)
CREAT SERPL-MCNC: 0.5 MG/DL — SIGNIFICANT CHANGE UP (ref 0.5–1.3)
GLUCOSE SERPL-MCNC: 107 MG/DL — HIGH (ref 70–99)
HCT VFR BLD CALC: 26.5 % — LOW (ref 39–50)
HCT VFR BLD CALC: 32.9 % — LOW (ref 39–50)
HGB BLD-MCNC: 10.5 G/DL — LOW (ref 13–17)
HGB BLD-MCNC: 8.6 G/DL — LOW (ref 13–17)
MAGNESIUM SERPL-MCNC: 2 MG/DL — SIGNIFICANT CHANGE UP (ref 1.6–2.6)
MCHC RBC-ENTMCNC: 30.4 PG — SIGNIFICANT CHANGE UP (ref 27–34)
MCHC RBC-ENTMCNC: 31.1 PG — SIGNIFICANT CHANGE UP (ref 27–34)
MCHC RBC-ENTMCNC: 31.9 GM/DL — LOW (ref 32–36)
MCHC RBC-ENTMCNC: 32.5 GM/DL — SIGNIFICANT CHANGE UP (ref 32–36)
MCV RBC AUTO: 93.6 FL — SIGNIFICANT CHANGE UP (ref 80–100)
MCV RBC AUTO: 97.3 FL — SIGNIFICANT CHANGE UP (ref 80–100)
PHOSPHATE SERPL-MCNC: 3.2 MG/DL — SIGNIFICANT CHANGE UP (ref 2.5–4.5)
PLATELET # BLD AUTO: 213 K/UL — SIGNIFICANT CHANGE UP (ref 150–400)
PLATELET # BLD AUTO: 217 K/UL — SIGNIFICANT CHANGE UP (ref 150–400)
POTASSIUM SERPL-MCNC: 4.1 MMOL/L — SIGNIFICANT CHANGE UP (ref 3.5–5.3)
POTASSIUM SERPL-SCNC: 4.1 MMOL/L — SIGNIFICANT CHANGE UP (ref 3.5–5.3)
PROT SERPL-MCNC: 6.7 GM/DL — SIGNIFICANT CHANGE UP (ref 6–8.3)
RBC # BLD: 2.83 M/UL — LOW (ref 4.2–5.8)
RBC # BLD: 3.38 M/UL — LOW (ref 4.2–5.8)
RBC # FLD: 12.6 % — SIGNIFICANT CHANGE UP (ref 10.3–14.5)
RBC # FLD: 16 % — HIGH (ref 10.3–14.5)
SODIUM SERPL-SCNC: 140 MMOL/L — SIGNIFICANT CHANGE UP (ref 135–145)
WBC # BLD: 20.08 K/UL — HIGH (ref 3.8–10.5)
WBC # BLD: 4.92 K/UL — SIGNIFICANT CHANGE UP (ref 3.8–10.5)
WBC # FLD AUTO: 20.08 K/UL — HIGH (ref 3.8–10.5)
WBC # FLD AUTO: 4.92 K/UL — SIGNIFICANT CHANGE UP (ref 3.8–10.5)

## 2020-05-17 PROCEDURE — 99291 CRITICAL CARE FIRST HOUR: CPT

## 2020-05-17 RX ORDER — FENTANYL CITRATE 50 UG/ML
100 INJECTION INTRAVENOUS ONCE
Refills: 0 | Status: DISCONTINUED | OUTPATIENT
Start: 2020-05-17 | End: 2020-05-17

## 2020-05-17 RX ADMIN — SENNA PLUS 2 TABLET(S): 8.6 TABLET ORAL at 22:21

## 2020-05-17 RX ADMIN — Medication 1 MILLIGRAM(S): at 12:45

## 2020-05-17 RX ADMIN — Medication 650 MILLIGRAM(S): at 22:00

## 2020-05-17 RX ADMIN — DEXMEDETOMIDINE HYDROCHLORIDE IN 0.9% SODIUM CHLORIDE 20.6 MICROGRAM(S)/KG/HR: 4 INJECTION INTRAVENOUS at 19:45

## 2020-05-17 RX ADMIN — Medication 1 MILLIGRAM(S): at 05:21

## 2020-05-17 RX ADMIN — CHLORHEXIDINE GLUCONATE 15 MILLILITER(S): 213 SOLUTION TOPICAL at 17:01

## 2020-05-17 RX ADMIN — DEXMEDETOMIDINE HYDROCHLORIDE IN 0.9% SODIUM CHLORIDE 20.6 MICROGRAM(S)/KG/HR: 4 INJECTION INTRAVENOUS at 05:23

## 2020-05-17 RX ADMIN — DEXMEDETOMIDINE HYDROCHLORIDE IN 0.9% SODIUM CHLORIDE 20.6 MICROGRAM(S)/KG/HR: 4 INJECTION INTRAVENOUS at 10:17

## 2020-05-17 RX ADMIN — DEXMEDETOMIDINE HYDROCHLORIDE IN 0.9% SODIUM CHLORIDE 20.6 MICROGRAM(S)/KG/HR: 4 INJECTION INTRAVENOUS at 02:58

## 2020-05-17 RX ADMIN — GABAPENTIN 400 MILLIGRAM(S): 400 CAPSULE ORAL at 05:21

## 2020-05-17 RX ADMIN — Medication 100 MILLIGRAM(S): at 13:06

## 2020-05-17 RX ADMIN — DEXMEDETOMIDINE HYDROCHLORIDE IN 0.9% SODIUM CHLORIDE 20.6 MICROGRAM(S)/KG/HR: 4 INJECTION INTRAVENOUS at 08:54

## 2020-05-17 RX ADMIN — Medication 30 MILLIGRAM(S): at 23:57

## 2020-05-17 RX ADMIN — DEXMEDETOMIDINE HYDROCHLORIDE IN 0.9% SODIUM CHLORIDE 20.6 MICROGRAM(S)/KG/HR: 4 INJECTION INTRAVENOUS at 15:04

## 2020-05-17 RX ADMIN — Medication 30 MILLIGRAM(S): at 13:06

## 2020-05-17 RX ADMIN — PANTOPRAZOLE SODIUM 40 MILLIGRAM(S): 20 TABLET, DELAYED RELEASE ORAL at 13:06

## 2020-05-17 RX ADMIN — CHLORHEXIDINE GLUCONATE 15 MILLILITER(S): 213 SOLUTION TOPICAL at 05:11

## 2020-05-17 RX ADMIN — ENOXAPARIN SODIUM 40 MILLIGRAM(S): 100 INJECTION SUBCUTANEOUS at 13:06

## 2020-05-17 RX ADMIN — DEXMEDETOMIDINE HYDROCHLORIDE IN 0.9% SODIUM CHLORIDE 20.6 MICROGRAM(S)/KG/HR: 4 INJECTION INTRAVENOUS at 00:41

## 2020-05-17 RX ADMIN — Medication 30 MILLIGRAM(S): at 17:01

## 2020-05-17 RX ADMIN — Medication 1 MILLIGRAM(S): at 21:25

## 2020-05-17 RX ADMIN — Medication 10 MILLIGRAM(S): at 05:21

## 2020-05-17 RX ADMIN — DEXMEDETOMIDINE HYDROCHLORIDE IN 0.9% SODIUM CHLORIDE 20.6 MICROGRAM(S)/KG/HR: 4 INJECTION INTRAVENOUS at 13:50

## 2020-05-17 RX ADMIN — GABAPENTIN 400 MILLIGRAM(S): 400 CAPSULE ORAL at 13:07

## 2020-05-17 RX ADMIN — GABAPENTIN 400 MILLIGRAM(S): 400 CAPSULE ORAL at 22:21

## 2020-05-17 RX ADMIN — DEXMEDETOMIDINE HYDROCHLORIDE IN 0.9% SODIUM CHLORIDE 20.6 MICROGRAM(S)/KG/HR: 4 INJECTION INTRAVENOUS at 22:33

## 2020-05-17 NOTE — PROGRESS NOTE ADULT - ASSESSMENT
A/P: 65 male with acute respiratory failure from altered awareness and sedation  COPD  AFIB s/p watchman  CHF  HTN  ETOH abuse    Plan:   PULM: S/P Trach and PEG on 5/15.  For weaning today.  Now on xanax atc, try to wean off the precedex    Cardio: Add back ASA today and will d/w TS when Plavix and be reintroduced    Renal: Cr. Stable    GI: Feeds, PPI    ID: Off Antibiotics    PSY: Thiamine for chronic ETOH abuse and thiamine deficiency folate    Lovenox DVT Prophylaxis    D/W the patients daughter

## 2020-05-17 NOTE — PROGRESS NOTE ADULT - SUBJECTIVE AND OBJECTIVE BOX
BRIEF HOSPITAL COURSE:  65M with PMHx of EtOH abuse/withdrawal, cAF sp Watchman, HTN, CVA, smoker, COPD, HFpEF, GERD, cirrhosis, LBKA  admitted on 4/18/2020 with  acute on chronic hypercapnic & hypoxemic respiratory failure due to exacerbation of COPD and/or bronchitis. Course complicated by EtOH withdrawal, MRSA and CRP PNA.     Events last 24 hours: On precedex, weaned on PS earlier yesterday, back on vent overnight, afebrile, hemodynamically stable.     PAST MEDICAL & SURGICAL HISTORY:  Chronic CHF  COPD without exacerbation  Atrial fibrillation  Presence of Watchman left atrial appendage closure device  Hypertension  GERD (gastroesophageal reflux disease)  Chronic obstructive pulmonary disease (COPD)  Anemia  Falls  Meningitis  Collapsed lung  Alcohol withdrawal  Emphysema of lung  Cirrhosis  CHF (congestive heart failure)  Poor historian  Alcohol abuse  Chronic atrial fibrillation  Unilateral amputation of lower extremity below knee  Presence of Watchman left atrial appendage closure device  S/P BKA (below knee amputation) unilateral, left        REVIEW OF SYSTEMS  unable to obtain, sedated on precedex on vent       Hosp day #29d    Vent day # 29d    Mode: AC/ CMV (Assist Control/ Continuous Mandatory Ventilation)  RR (machine): 15  TV (machine): 450  FiO2: 40  PEEP: 5  ITime: 1  MAP: 14  PIP: 39      ICU Vital Signs Last 24 Hrs  T(C): 37 (16 May 2020 17:00), Max: 37 (16 May 2020 17:00)  T(F): 98.6 (16 May 2020 17:00), Max: 98.6 (16 May 2020 17:00)  HR: 74 (17 May 2020 00:08) (65 - 104)  BP: 130/61 (16 May 2020 22:00) (105/57 - 136/64)  BP(mean): 77 (16 May 2020 22:00) (66 - 80)  ABP: --  ABP(mean): --  RR: 18 (16 May 2020 22:00) (13 - 21)  SpO2: 100% (17 May 2020 00:08) (82% - 100%)      Physical Examination:    General: sedated on vent, obese    HEENT: Pupils equal, reactive to light. Symmetric, sclera anicteric, trach with dried blood on surgicel, no active bleeding    PULM: course BS bilaterally    CVS: AFib, S1/S2    ABD: +BS, soft, mild distention, PEG site without bleeding/swelling    EXT: Mild generalized anasarca. LLE BKA    SKIN: Warm and well perfused    NEURO: RASS 0 on precedex    Labs:                                    10.3   5.88  )-----------( 248      ( 16 May 2020 06:38 )             32.0   05-16    139  |  103  |  15  ----------------------------<  88  4.2   |  30  |  0.54    Ca    8.4<L>      16 May 2020 06:38  Phos  3.8     05-16  Mg     1.7     05-16    TPro  6.5  /  Alb  1.8<L>  /  TBili  1.2  /  DBili  x   /  AST  42<H>  /  ALT  45  /  AlkPhos  147<H>  05-16    Radiology:    EXAM:  XR CHEST PORTABLE URGENT 1V                            PROCEDURE DATE:  05/15/2020          INTERPRETATION:    Examination: XR CHEST URGENT    History: ADMDIAG1: R06.03 ACUTE RESPIRATORY DISTRESS/, s/p trach COVID negative patient.    Findings:  Status post tracheotomy. A tracheostomy tube is now visualized. The endotracheal tube seen on 5/12/2022 removed. The nasogastric tube has been removed. The heart is enlarged, unchanged. The lungs are clear. No hilar or mediastinal abnormality. No evidence of a pleural effusion or pneumothorax.    Impression:  1.  Cardiomegaly. Clear lungs. Status post tracheostomy insertion.      DISCRETE X-RAY DATA:  Percent of LEFT lung opacification: Clear  Percent of RIGHT lung opacification: Clear  Change in lung opacification from most recent x-ray (<=3 days): Stable  Change from prior dated 3 or more days (same admission): Stable                  RAISA MULLER M.D.; ATTENDING RADIOLOGIST  This document has been electronically signed. May 15 2020 12:00PM

## 2020-05-17 NOTE — PROGRESS NOTE ADULT - ASSESSMENT
Impression:  1. acute hypercarbic respiratory failure with failure to wean from vent- sp trach/PEG  2. acute COPD exacerbation  3. MRSA and CRP PNA- now treated   4. chronic atrial fibrillation with RVR  5. EtOH abuse    Plan:    Neuro - Light sedation with precedex to facilitate safe ventilation and weaning, daily thiamine, transition IV ativan to standing xanax PRN    CV -  cont dilitiazem at current dosing, rates remain mostly <110          d/c midodrine          keep K~4 and Mg>2 for optimal arrhythmia control          sp Watchman    Pulm -  full vent support overnight with LTV cc/kg IDW             active titration of FiO2 to achieve sats>88%, avoid hyperoxia in this pt population as it will likely worsen CO2 retention             PS trials in am             Vent bundle in place and reviewed     GI -  PPI  start enteric feeds, titrate to goal as tolerates.    Renal - Even to negative fluid balance as tolerated by hemodynamics, Cr stable, repeat BMP in am.       Heme -  Pharmacologic DVT PPx  in addition to SCD's    ID - Completed course abx, monitoring off abx, WBC normal, afebrile. Abx addition based on clinical features, culture data, and judicious procalcitonin monitoring.      Endo -  Aggressive glycemic control to limit FS glucose to < 180mg/dl, stable    Goals of care considerations:  Ongoing assessment for patient specific treatment options based on progression or decline.

## 2020-05-17 NOTE — PROGRESS NOTE ADULT - SUBJECTIVE AND OBJECTIVE BOX
HPI: Patient seen at a  this morning for AMS and hypoxic  Patient had been admitted with a COPD exacerbation.  It was believed that he went into ETOH withdrawal and had a total of 6MG Ativan over night.  He required intubation 4/23.    4/24: he remains intubated.  Will try to wean this afternoon.      4/25: He weaned this morning and failed with a decreased Tv.  But close to extubation.    4/26: Patient weaned well this morning and was extubated.      5/2: Chart reviewed, Patient was reintubated, failed weaning this morning, CXR with improving infiltrates  5/3: Patient remains on the vent failed weaning x3 today with increased RR and low TV, Off Levophed now  5/4: Failed weaning yesterday, had to change his ET tube yesterday because he chewed through it,     5/5: Pt on Precedex and Diprivan for sedation, Patient failed weaning again today  5/6: Patient failed weaning today with a low TV and High RR  5/7: He again failed weaning, NGT replaced, Fi O2 back to 40%, WBC normal,     5/8: For weaning today, he has failed weaning daily  5/9: Weaning this morning off levo  5/10: He Failed weaning this morning    5/14: Patient failed weaning again this morning.  For Trach and PEG in 5/15    5/17: S/P Trach and PEG.  Weaned for several hours yesterday            PAST MEDICAL & SURGICAL HISTORY:  Chronic CHF  COPD without exacerbation  Atrial fibrillation  Presence of Watchman left atrial appendage closure device  Hypertension  GERD (gastroesophageal reflux disease)  Chronic obstructive pulmonary disease (COPD)  Anemia  Falls  Meningitis  Collapsed lung  Alcohol withdrawal  Emphysema of lung  Cirrhosis  CHF (congestive heart failure)  Poor historian  Alcohol abuse  Chronic atrial fibrillation  Unilateral amputation of lower extremity below knee  Presence of Watchman left atrial appendage closure device  S/P BKA (below knee amputation) unilateral, left      FAMILY HISTORY:  No pertinent family history in first degree relatives  Family history unknown: Adopted      Social Hx:    Allergies    Ceclor (Rash)  Duricef (Rash)    Intolerances            ICU Vital Signs Last 24 Hrs  T(C): 37.1 (17 May 2020 09:38), Max: 37.3 (17 May 2020 00:29)  T(F): 98.8 (17 May 2020 09:38), Max: 99.1 (17 May 2020 00:29)  HR: 83 (17 May 2020 09:00) (56 - 84)  BP: 115/65 (17 May 2020 09:00) (105/57 - 145/68)  BP(mean): 73 (17 May 2020 09:00) (67 - 106)  ABP: --  ABP(mean): --  RR: 23 (17 May 2020 09:00) (13 - 23)  SpO2: 94% (17 May 2020 09:15) (82% - 100%)      Mode: AC/ CMV (Assist Control/ Continuous Mandatory Ventilation)  RR (machine): 15  TV (machine): 450  FiO2: 40  PEEP: 5  ITime: 1  MAP: 18  PIP: 34      I&O's Summary    16 May 2020 07:01  -  17 May 2020 07:00  --------------------------------------------------------  IN: 949 mL / OUT: 900 mL / NET: 49 mL                              10.5   4.92  )-----------( 217      ( 17 May 2020 06:40 )             32.9       05-17    140  |  105  |  12  ----------------------------<  107<H>  4.1   |  27  |  0.50    Ca    8.7      17 May 2020 06:40  Phos  3.2     05-17  Mg     2.0     05-17    TPro  6.7  /  Alb  1.9<L>  /  TBili  1.5<H>  /  DBili  x   /  AST  24  /  ALT  35  /  AlkPhos  143<H>  05-17                    MEDICATIONS  (STANDING):  ALBUTerol    90 MICROgram(s) HFA Inhaler 2 Puff(s) Inhalation every 4 hours  budesonide 160 MICROgram(s)/formoterol 4.5 MICROgram(s) Inhaler 2 Puff(s) Inhalation two times a day  chlorhexidine 0.12% Liquid 15 milliLiter(s) Oral Mucosa every 12 hours  dexMEDEtomidine Infusion 0.7 MICROgram(s)/kG/Hr (20.6 mL/Hr) IV Continuous <Continuous>  diltiazem    Tablet 30 milliGRAM(s) Oral every 6 hours  enoxaparin Injectable 40 milliGRAM(s) SubCutaneous daily  gabapentin 400 milliGRAM(s) Oral three times a day  pantoprazole  Injectable 40 milliGRAM(s) IV Push daily  senna 2 Tablet(s) Oral at bedtime  thiamine 100 milliGRAM(s) Oral daily  tiotropium 18 MICROgram(s) Capsule 1 Capsule(s) Inhalation daily    MEDICATIONS  (PRN):  acetaminophen   Tablet .. 650 milliGRAM(s) Oral every 6 hours PRN Temp greater or equal to 38.5C (101.3F)  ALPRAZolam 1 milliGRAM(s) Oral every 6 hours PRN anxiety  metoclopramide Injectable 10 milliGRAM(s) IV Push every 8 hours PRN Vomiting  polyethylene glycol 3350 17 Gram(s) Oral daily PRN Constipation      DVT Prophylaxis:    Advanced Directives:  Discussed with:    Visit Information: 30 min    ** Time is exclusive of billed procedures and/or teaching and/or routine family updates.

## 2020-05-17 NOTE — PROGRESS NOTE ADULT - SUBJECTIVE AND OBJECTIVE BOX
Subjective:    pat on vent, for PS trial today.    Home Medications:  albuterol 2.5 mg/3 mL (0.083%) inhalation solution: 3 milliliter(s) inhaled every 6 hours, As Needed -for shortness of breath and/or wheezing (19 Apr 2020 03:12)  gabapentin 400 mg oral capsule: 1 cap(s) orally 3 times a day (19 Apr 2020 03:12)  pantoprazole 40 mg oral granule, enteric coated: 40 milligram(s) orally once a day (19 Apr 2020 03:12)  Spiriva 18 mcg inhalation capsule: 1 cap(s) inhaled once a day (19 Apr 2020 03:12)  tamsulosin 0.4 mg oral capsule: 1 cap(s) orally once a day (at bedtime) (19 Apr 2020 03:12)  traZODone 50 mg oral tablet: 2 tab(s) orally once a day (at bedtime), As Needed (19 Apr 2020 03:12)    MEDICATIONS  (STANDING):  ALBUTerol    90 MICROgram(s) HFA Inhaler 2 Puff(s) Inhalation every 4 hours  budesonide 160 MICROgram(s)/formoterol 4.5 MICROgram(s) Inhaler 2 Puff(s) Inhalation two times a day  chlorhexidine 0.12% Liquid 15 milliLiter(s) Oral Mucosa every 12 hours  dexMEDEtomidine Infusion 0.7 MICROgram(s)/kG/Hr (20.6 mL/Hr) IV Continuous <Continuous>  diltiazem    Tablet 30 milliGRAM(s) Oral every 6 hours  enoxaparin Injectable 40 milliGRAM(s) SubCutaneous daily  gabapentin 400 milliGRAM(s) Oral three times a day  pantoprazole  Injectable 40 milliGRAM(s) IV Push daily  senna 2 Tablet(s) Oral at bedtime  thiamine 100 milliGRAM(s) Oral daily  tiotropium 18 MICROgram(s) Capsule 1 Capsule(s) Inhalation daily    MEDICATIONS  (PRN):  acetaminophen   Tablet .. 650 milliGRAM(s) Oral every 6 hours PRN Temp greater or equal to 38.5C (101.3F)  ALPRAZolam 1 milliGRAM(s) Oral every 6 hours PRN anxiety  metoclopramide Injectable 10 milliGRAM(s) IV Push every 8 hours PRN Vomiting  polyethylene glycol 3350 17 Gram(s) Oral daily PRN Constipation      Allergies    Ceclor (Rash)  Duricef (Rash)    Intolerances        Vital Signs Last 24 Hrs  T(C): 37.1 (17 May 2020 09:38), Max: 37.3 (17 May 2020 00:29)  T(F): 98.8 (17 May 2020 09:38), Max: 99.1 (17 May 2020 00:29)  HR: 83 (17 May 2020 09:00) (56 - 84)  BP: 115/65 (17 May 2020 09:00) (108/56 - 145/68)  BP(mean): 73 (17 May 2020 09:00) (67 - 106)  RR: 23 (17 May 2020 09:00) (13 - 23)  SpO2: 94% (17 May 2020 09:15) (82% - 100%)  Mode: AC/ CMV (Assist Control/ Continuous Mandatory Ventilation)  RR (machine): 15  TV (machine): 450  FiO2: 40  PEEP: 5  ITime: 1  MAP: 18  PIP: 34      PHYSICAL EXAMINATION:    NECK:  Supple. No lymphadenopathy. Jugular venous pressure not elevated. Carotids equal.   HEART:   The cardiac impulse has a normal quality. Reg., Nl S1 and S2.  There are no murmurs, rubs or gallops noted  CHEST:  Chest is clear to auscultation. Normal respiratory effort.  ABDOMEN:  Soft and nontender.   EXTREMITIES:  There is no edema.       LABS:                        10.5   4.92  )-----------( 217      ( 17 May 2020 06:40 )             32.9     05-17    140  |  105  |  12  ----------------------------<  107<H>  4.1   |  27  |  0.50    Ca    8.7      17 May 2020 06:40  Phos  3.2     05-17  Mg     2.0     05-17    TPro  6.7  /  Alb  1.9<L>  /  TBili  1.5<H>  /  DBili  x   /  AST  24  /  ALT  35  /  AlkPhos  143<H>  05-17

## 2020-05-18 LAB
ANION GAP SERPL CALC-SCNC: 7 MMOL/L — SIGNIFICANT CHANGE UP (ref 5–17)
APPEARANCE UR: ABNORMAL
BILIRUB UR-MCNC: ABNORMAL
BUN SERPL-MCNC: 11 MG/DL — SIGNIFICANT CHANGE UP (ref 7–23)
CALCIUM SERPL-MCNC: 8.1 MG/DL — LOW (ref 8.5–10.1)
CHLORIDE SERPL-SCNC: 104 MMOL/L — SIGNIFICANT CHANGE UP (ref 96–108)
CO2 SERPL-SCNC: 28 MMOL/L — SIGNIFICANT CHANGE UP (ref 22–31)
COLOR SPEC: ABNORMAL
CREAT SERPL-MCNC: 0.63 MG/DL — SIGNIFICANT CHANGE UP (ref 0.5–1.3)
DIFF PNL FLD: NEGATIVE — SIGNIFICANT CHANGE UP
GLUCOSE SERPL-MCNC: 114 MG/DL — HIGH (ref 70–99)
GLUCOSE UR QL: NEGATIVE MG/DL — SIGNIFICANT CHANGE UP
GRAM STN FLD: SIGNIFICANT CHANGE UP
HCT VFR BLD CALC: 31.7 % — LOW (ref 39–50)
HGB BLD-MCNC: 10.2 G/DL — LOW (ref 13–17)
KETONES UR-MCNC: ABNORMAL
LEUKOCYTE ESTERASE UR-ACNC: ABNORMAL
MAGNESIUM SERPL-MCNC: 1.6 MG/DL — SIGNIFICANT CHANGE UP (ref 1.6–2.6)
MCHC RBC-ENTMCNC: 31.4 PG — SIGNIFICANT CHANGE UP (ref 27–34)
MCHC RBC-ENTMCNC: 32.2 GM/DL — SIGNIFICANT CHANGE UP (ref 32–36)
MCV RBC AUTO: 97.5 FL — SIGNIFICANT CHANGE UP (ref 80–100)
NITRITE UR-MCNC: POSITIVE
PH UR: 8 — SIGNIFICANT CHANGE UP (ref 5–8)
PHOSPHATE SERPL-MCNC: 2.6 MG/DL — SIGNIFICANT CHANGE UP (ref 2.5–4.5)
PLATELET # BLD AUTO: 207 K/UL — SIGNIFICANT CHANGE UP (ref 150–400)
POTASSIUM SERPL-MCNC: 3.4 MMOL/L — LOW (ref 3.5–5.3)
POTASSIUM SERPL-SCNC: 3.4 MMOL/L — LOW (ref 3.5–5.3)
PROT UR-MCNC: 30 MG/DL
RBC # BLD: 3.25 M/UL — LOW (ref 4.2–5.8)
RBC # FLD: 12.8 % — SIGNIFICANT CHANGE UP (ref 10.3–14.5)
SODIUM SERPL-SCNC: 139 MMOL/L — SIGNIFICANT CHANGE UP (ref 135–145)
SP GR SPEC: 1.01 — SIGNIFICANT CHANGE UP (ref 1.01–1.02)
SPECIMEN SOURCE: SIGNIFICANT CHANGE UP
UROBILINOGEN FLD QL: 12 MG/DL
WBC # BLD: 6.22 K/UL — SIGNIFICANT CHANGE UP (ref 3.8–10.5)
WBC # FLD AUTO: 6.22 K/UL — SIGNIFICANT CHANGE UP (ref 3.8–10.5)

## 2020-05-18 PROCEDURE — 99291 CRITICAL CARE FIRST HOUR: CPT

## 2020-05-18 PROCEDURE — 99232 SBSQ HOSP IP/OBS MODERATE 35: CPT

## 2020-05-18 PROCEDURE — 71045 X-RAY EXAM CHEST 1 VIEW: CPT | Mod: 26

## 2020-05-18 RX ORDER — AZTREONAM 2 G
VIAL (EA) INJECTION
Refills: 0 | Status: DISCONTINUED | OUTPATIENT
Start: 2020-05-18 | End: 2020-05-19

## 2020-05-18 RX ORDER — SODIUM CHLORIDE 9 MG/ML
1000 INJECTION, SOLUTION INTRAVENOUS
Refills: 0 | Status: DISCONTINUED | OUTPATIENT
Start: 2020-05-18 | End: 2020-05-19

## 2020-05-18 RX ORDER — AZTREONAM 2 G
2000 VIAL (EA) INJECTION EVERY 8 HOURS
Refills: 0 | Status: DISCONTINUED | OUTPATIENT
Start: 2020-05-18 | End: 2020-05-19

## 2020-05-18 RX ORDER — SODIUM CHLORIDE 9 MG/ML
1000 INJECTION, SOLUTION INTRAVENOUS
Refills: 0 | Status: DISCONTINUED | OUTPATIENT
Start: 2020-05-18 | End: 2020-05-21

## 2020-05-18 RX ORDER — POLYETHYLENE GLYCOL 3350 17 G/17G
17 POWDER, FOR SOLUTION ORAL DAILY
Refills: 0 | Status: DISCONTINUED | OUTPATIENT
Start: 2020-05-18 | End: 2020-05-26

## 2020-05-18 RX ORDER — AZTREONAM 2 G
2000 VIAL (EA) INJECTION ONCE
Refills: 0 | Status: COMPLETED | OUTPATIENT
Start: 2020-05-18 | End: 2020-05-18

## 2020-05-18 RX ORDER — VANCOMYCIN HCL 1 G
1000 VIAL (EA) INTRAVENOUS ONCE
Refills: 0 | Status: COMPLETED | OUTPATIENT
Start: 2020-05-18 | End: 2020-05-18

## 2020-05-18 RX ADMIN — ALBUTEROL 2 PUFF(S): 90 AEROSOL, METERED ORAL at 15:50

## 2020-05-18 RX ADMIN — DEXMEDETOMIDINE HYDROCHLORIDE IN 0.9% SODIUM CHLORIDE 20.6 MICROGRAM(S)/KG/HR: 4 INJECTION INTRAVENOUS at 04:00

## 2020-05-18 RX ADMIN — Medication 100 MILLIGRAM(S): at 12:33

## 2020-05-18 RX ADMIN — Medication 1 MILLIGRAM(S): at 18:32

## 2020-05-18 RX ADMIN — ALBUTEROL 2 PUFF(S): 90 AEROSOL, METERED ORAL at 20:38

## 2020-05-18 RX ADMIN — BUDESONIDE AND FORMOTEROL FUMARATE DIHYDRATE 2 PUFF(S): 160; 4.5 AEROSOL RESPIRATORY (INHALATION) at 20:38

## 2020-05-18 RX ADMIN — Medication 30 MILLIGRAM(S): at 17:18

## 2020-05-18 RX ADMIN — Medication 30 MILLIGRAM(S): at 23:49

## 2020-05-18 RX ADMIN — ENOXAPARIN SODIUM 40 MILLIGRAM(S): 100 INJECTION SUBCUTANEOUS at 12:32

## 2020-05-18 RX ADMIN — Medication 250 MILLIGRAM(S): at 11:15

## 2020-05-18 RX ADMIN — POLYETHYLENE GLYCOL 3350 17 GRAM(S): 17 POWDER, FOR SOLUTION ORAL at 14:07

## 2020-05-18 RX ADMIN — ALBUTEROL 2 PUFF(S): 90 AEROSOL, METERED ORAL at 13:10

## 2020-05-18 RX ADMIN — DEXMEDETOMIDINE HYDROCHLORIDE IN 0.9% SODIUM CHLORIDE 20.6 MICROGRAM(S)/KG/HR: 4 INJECTION INTRAVENOUS at 03:05

## 2020-05-18 RX ADMIN — DEXMEDETOMIDINE HYDROCHLORIDE IN 0.9% SODIUM CHLORIDE 20.6 MICROGRAM(S)/KG/HR: 4 INJECTION INTRAVENOUS at 20:00

## 2020-05-18 RX ADMIN — Medication 1 MILLIGRAM(S): at 05:53

## 2020-05-18 RX ADMIN — Medication 100 MILLIGRAM(S): at 16:01

## 2020-05-18 RX ADMIN — Medication 30 MILLIGRAM(S): at 12:33

## 2020-05-18 RX ADMIN — Medication 1 MILLIGRAM(S): at 12:33

## 2020-05-18 RX ADMIN — GABAPENTIN 400 MILLIGRAM(S): 400 CAPSULE ORAL at 05:53

## 2020-05-18 RX ADMIN — CHLORHEXIDINE GLUCONATE 15 MILLILITER(S): 213 SOLUTION TOPICAL at 17:18

## 2020-05-18 RX ADMIN — GABAPENTIN 400 MILLIGRAM(S): 400 CAPSULE ORAL at 22:00

## 2020-05-18 RX ADMIN — SODIUM CHLORIDE 100 MILLILITER(S): 9 INJECTION, SOLUTION INTRAVENOUS at 18:44

## 2020-05-18 RX ADMIN — Medication 30 MILLIGRAM(S): at 05:54

## 2020-05-18 RX ADMIN — PANTOPRAZOLE SODIUM 40 MILLIGRAM(S): 20 TABLET, DELAYED RELEASE ORAL at 12:33

## 2020-05-18 RX ADMIN — GABAPENTIN 400 MILLIGRAM(S): 400 CAPSULE ORAL at 14:07

## 2020-05-18 RX ADMIN — Medication 100 MILLIGRAM(S): at 11:14

## 2020-05-18 RX ADMIN — CHLORHEXIDINE GLUCONATE 15 MILLILITER(S): 213 SOLUTION TOPICAL at 05:37

## 2020-05-18 RX ADMIN — Medication 650 MILLIGRAM(S): at 13:00

## 2020-05-18 NOTE — PROGRESS NOTE ADULT - ASSESSMENT
66 y/o M with history of hypercapnic respiratory failure Trach/PEG in 2015 and 2017,  now intubated from hypercapnic respiratory failure , COVID neg. S/P Trach and PEG 5/15. Now with fevers, on day #1 vanco/Aztreonam. Cultures pending, +UA for UTI.     may resume home RX ASA and plavix for Hx of CVA  C/W ABX, ID follow up  FU cultures, would add urine culture and r/o retention  Miralax for constipation, last recorded BM 5/15    Thu Veloz PA  Thoracic Sx  #[id number]

## 2020-05-18 NOTE — PROGRESS NOTE ADULT - ASSESSMENT
PROBLEMS;    New fever  Ac on chronic hypercapnic & hypoxamic respiratory failure-s/p trach  sputum-Few Pseudomonas aeruginosa (Carbapenem Resistant)/Moderate Methicillin resistant Staphylococcus aureus  afib with RVR  OHS/VIJAY  AC flare of COPD/chronic vs acute on chronic bronchitis  Mild interstitial lung disease-NSIP h/o smoking  COVID negativex2  Pulmonary hypertension  Nonobstructing stone in the left ureterovesicular junction/ nonobstructing stone in the lower pole right kidney.  Subacute hemorrhage in the right rectus abdominis along the anterior sheath, measures 1.6 cm in thickness and extends from the umbilicus to the inferior myotendinous junction  Cirrhosis  Mild splenomegaly-portal venous hypertension.  Chronic afib w Watchman device  CHF  BPH  GERD  ETOH abuse  seizures disorder    PLAN;    vent support-pat multiple failed weaning attempts- s/p trach weaning as tolerated  iv azactam  iv percedex  aerosols  supportive care  covid repeat testing-neg  d/w staff

## 2020-05-18 NOTE — PROGRESS NOTE ADULT - ASSESSMENT
A/P: 65 male with acute respiratory failure from altered awareness and sedation  COPD  AFIB s/p watchman  CHF  HTN  ETOH abuse    Plan:   PULM: S/P Trach and PEG on 5/15.  No weaning today.  Now on xanax atc, try to wean off the precedex.  CXR reviewed and no new infiltrate    Cardio: Continue ASA and Added back Plavix today    Renal: Cr. Stable    GI: Feeds, PPI    ID: Temps to 104.  Sending BC, SC, UA.  UA is Positive.  Started Aztreonam and Vanco x 1.      PSY: Thiamine for chronic ETOH abuse and thiamine deficiency folate    Lovenox DVT Prophylaxis    D/W the patients daughter on a daily basis

## 2020-05-18 NOTE — PROGRESS NOTE ADULT - SUBJECTIVE AND OBJECTIVE BOX
Subjective: S/P trach and PEG 5/15. Febrile to 104 yesterday, started on vanco and aztreonam today. Remains on vent support 40% and Peep 5. Tube feeds provided.     Vital Signs:  Vital Signs Last 24 Hrs  T(C): 38.2 (05-18-20 @ 09:00), Max: 40.3 (05-18-20 @ 06:23)  T(F): 100.8 (05-18-20 @ 09:00), Max: 104.5 (05-18-20 @ 06:23)  HR: 92 (05-18-20 @ 12:00) (77 - 124)  BP: 102/48 (05-18-20 @ 12:00) (45/23 - 129/66)  RR: 18 (05-18-20 @ 12:00) (16 - 35)  SpO2: 98% (05-18-20 @ 12:00) (81% - 100%) on (O2)        General: NAD  Neurology: Awake  Eyes: Scleras clear  Neck: Neck supple, trachea midline, No JVD, trach in place without erythema or drainage  Respiratory: B/L BS  CV: S1S2  Abdominal: Soft, distended, PEG in place with no drainage or erythema      Relevant labs, radiology and Medications reviewed                        10.2   6.22  )-----------( 207      ( 18 May 2020 08:11 )             31.7     05-18    139  |  104  |  11  ----------------------------<  114<H>  3.4<L>   |  28  |  0.63    Ca    8.1<L>      18 May 2020 08:11  Phos  2.6     05-18  Mg     1.6     05-18    TPro  6.7  /  Alb  1.9<L>  /  TBili  1.5<H>  /  DBili  x   /  AST  24  /  ALT  35  /  AlkPhos  143<H>  05-17      MEDICATIONS  (STANDING):  ALBUTerol    90 MICROgram(s) HFA Inhaler 2 Puff(s) Inhalation every 4 hours  aztreonam  IVPB 2000 milliGRAM(s) IV Intermittent every 8 hours  aztreonam  IVPB      budesonide 160 MICROgram(s)/formoterol 4.5 MICROgram(s) Inhaler 2 Puff(s) Inhalation two times a day  chlorhexidine 0.12% Liquid 15 milliLiter(s) Oral Mucosa every 12 hours  dexMEDEtomidine Infusion 0.7 MICROgram(s)/kG/Hr (20.6 mL/Hr) IV Continuous <Continuous>  diltiazem    Tablet 30 milliGRAM(s) Oral every 6 hours  enoxaparin Injectable 40 milliGRAM(s) SubCutaneous daily  gabapentin 400 milliGRAM(s) Oral three times a day  pantoprazole  Injectable 40 milliGRAM(s) IV Push daily  senna 2 Tablet(s) Oral at bedtime  thiamine 100 milliGRAM(s) Oral daily  tiotropium 18 MICROgram(s) Capsule 1 Capsule(s) Inhalation daily    MEDICATIONS  (PRN):  acetaminophen   Tablet .. 650 milliGRAM(s) Oral every 6 hours PRN Temp greater or equal to 38.5C (101.3F)  ALPRAZolam 1 milliGRAM(s) Oral every 6 hours PRN anxiety  metoclopramide Injectable 10 milliGRAM(s) IV Push every 8 hours PRN Vomiting  polyethylene glycol 3350 17 Gram(s) Oral daily PRN Constipation        Assessment  65y Male  w/ PAST MEDICAL & SURGICAL HISTORY:  Chronic CHF  COPD without exacerbation  Atrial fibrillation  Presence of Watchman left atrial appendage closure device  Hypertension  GERD (gastroesophageal reflux disease)  Chronic obstructive pulmonary disease (COPD)  Anemia  Falls  Meningitis  Collapsed lung  Alcohol withdrawal  Emphysema of lung  Cirrhosis  CHF (congestive heart failure)  Poor historian  Alcohol abuse  Chronic atrial fibrillation  Unilateral amputation of lower extremity below knee  Presence of Watchman left atrial appendage closure device  S/P BKA (below knee amputation) unilateral, left  admitted with complaints of Patient is a 65y old  Male who presents with a chief complaint of acute hypoxemic, hypercapneic resp failure  COPD exacerbation (18 May 2020 10:22)  patient underwent Bronchoscopy with creation of tracheostomy and insertion of percutaneous endoscopic gastrostomy (PEG) tube  Insertion, nasogastric tube

## 2020-05-18 NOTE — PROGRESS NOTE ADULT - SUBJECTIVE AND OBJECTIVE BOX
Subjective:    pat still on vent, fever 104.    Home Medications:  albuterol 2.5 mg/3 mL (0.083%) inhalation solution: 3 milliliter(s) inhaled every 6 hours, As Needed -for shortness of breath and/or wheezing (2020 03:12)  gabapentin 400 mg oral capsule: 1 cap(s) orally 3 times a day (2020 03:12)  pantoprazole 40 mg oral granule, enteric coated: 40 milligram(s) orally once a day (2020 03:12)  Spiriva 18 mcg inhalation capsule: 1 cap(s) inhaled once a day (2020 03:12)  tamsulosin 0.4 mg oral capsule: 1 cap(s) orally once a day (at bedtime) (2020 03:12)  traZODone 50 mg oral tablet: 2 tab(s) orally once a day (at bedtime), As Needed (2020 03:12)    MEDICATIONS  (STANDING):  ALBUTerol    90 MICROgram(s) HFA Inhaler 2 Puff(s) Inhalation every 4 hours  aztreonam  IVPB 2000 milliGRAM(s) IV Intermittent every 8 hours  aztreonam  IVPB      budesonide 160 MICROgram(s)/formoterol 4.5 MICROgram(s) Inhaler 2 Puff(s) Inhalation two times a day  chlorhexidine 0.12% Liquid 15 milliLiter(s) Oral Mucosa every 12 hours  dexMEDEtomidine Infusion 0.7 MICROgram(s)/kG/Hr (20.6 mL/Hr) IV Continuous <Continuous>  diltiazem    Tablet 30 milliGRAM(s) Oral every 6 hours  enoxaparin Injectable 40 milliGRAM(s) SubCutaneous daily  gabapentin 400 milliGRAM(s) Oral three times a day  pantoprazole  Injectable 40 milliGRAM(s) IV Push daily  polyethylene glycol 3350 17 Gram(s) Oral daily  senna 2 Tablet(s) Oral at bedtime  thiamine 100 milliGRAM(s) Oral daily  tiotropium 18 MICROgram(s) Capsule 1 Capsule(s) Inhalation daily    MEDICATIONS  (PRN):  acetaminophen   Tablet .. 650 milliGRAM(s) Oral every 6 hours PRN Temp greater or equal to 38.5C (101.3F)  ALPRAZolam 1 milliGRAM(s) Oral every 6 hours PRN anxiety  metoclopramide Injectable 10 milliGRAM(s) IV Push every 8 hours PRN Vomiting      Allergies    Ceclor (Rash)  Duricef (Rash)    Intolerances        Vital Signs Last 24 Hrs  T(C): 38.2 (18 May 2020 09:00), Max: 40.3 (18 May 2020 06:23)  T(F): 100.8 (18 May 2020 09:00), Max: 104.5 (18 May 2020 06:23)  HR: 92 (18 May 2020 12:00) (77 - 124)  BP: 102/48 (18 May 2020 12:00) (45/23 - 129/66)  BP(mean): 59 (18 May 2020 12:00) (29 - 78)  RR: 18 (18 May 2020 12:00) (17 - 35)  SpO2: 98% (18 May 2020 12:00) (81% - 100%)  Mode: AC/ CMV (Assist Control/ Continuous Mandatory Ventilation)  RR (machine): 15  TV (machine): 450  FiO2: 40  PEEP: 5  ITime: 1  MAP: 12  PIP: 21      PHYSICAL EXAMINATION:    NECK:  Supple. No lymphadenopathy. Jugular venous pressure not elevated. Carotids equal.   HEART:   The cardiac impulse has a normal quality. Reg., Nl S1 and S2.  There are no murmurs, rubs or gallops noted  CHEST:  Chest crackles to auscultation. Normal respiratory effort.  ABDOMEN:  Soft and nontender.   EXTREMITIES:  There is no edema.       LABS:                        10.2   6.22  )-----------( 207      ( 18 May 2020 08:11 )             31.7     05-18    139  |  104  |  11  ----------------------------<  114<H>  3.4<L>   |  28  |  0.63    Ca    8.1<L>      18 May 2020 08:11  Phos  2.6     05-18  Mg     1.6     05-18    TPro  6.7  /  Alb  1.9<L>  /  TBili  1.5<H>  /  DBili  x   /  AST  24  /  ALT  35  /  AlkPhos  143<H>  05-17      Urinalysis Basic - ( 18 May 2020 09:45 )    Color: Brown / Appearance: very cloudy / S.010 / pH: x  Gluc: x / Ketone: Moderate  / Bili: Moderate / Urobili: 12 mg/dL   Blood: x / Protein: 30 mg/dL / Nitrite: Positive   Leuk Esterase: Small / RBC: Negative /HPF / WBC 0-2   Sq Epi: x / Non Sq Epi: x / Bacteria: Moderate      Xray Chest 1 View- PORTABLE-Urgent (20 @ 09:49) >  IMPRESSION:    No lung consolidations.

## 2020-05-18 NOTE — PROGRESS NOTE ADULT - SUBJECTIVE AND OBJECTIVE BOX
HPI: Patient seen at a  this morning for AMS and hypoxic  Patient had been admitted with a COPD exacerbation.  It was believed that he went into ETOH withdrawal and had a total of 6MG Ativan over night.  He required intubation .    : he remains intubated.  Will try to wean this afternoon.      : He weaned this morning and failed with a decreased Tv.  But close to extubation.    : Patient weaned well this morning and was extubated.      : Chart reviewed, Patient was reintubated, failed weaning this morning, CXR with improving infiltrates  5/3: Patient remains on the vent failed weaning x3 today with increased RR and low TV, Off Levophed now  : Failed weaning yesterday, had to change his ET tube yesterday because he chewed through it,     : Pt on Precedex and Diprivan for sedation, Patient failed weaning again today  : Patient failed weaning today with a low TV and High RR  : He again failed weaning, NGT replaced, Fi O2 back to 40%, WBC normal,     : For weaning today, he has failed weaning daily  : Weaning this morning off levo  5/10: He Failed weaning this morning    : Patient failed weaning again this morning.  For Trach and PEG in 5/15    5/17: S/P Trach and PEG.  Weaned for several hours yesterday  : Weaned for under an hour yesterday but desated.  Temps to 104 x 2 over night.              PAST MEDICAL HX  Alcohol abuse    Alcohol withdrawal    Anemia    AFIB atrial fibrillation    CHF (congestive heart failure)    Chronic atrial fibrillation    Chronic CHF    Chronic obstructive pulmonary disease (COPD)    Cirrhosis    Collapsed lung    COPD without exacerbation    Emphysema of lung    Falls    GERD (gastroesophageal reflux disease)    HTN hypertension    Meningitis    Poor historian    Presence of Watchman left atrial appendage closure device.    PAST SURGICAL HX  Presence of Watchman left atrial appendage closure device    S/P BKA (below knee amputation) unilateral, left    Unilateral amputation of lower extremity below knee.      FAMILY HX  Family history unknown: Adopted      SOCIAL HX  lives alone  former smoker  alcohol use last 2-3 days ago (2020 03:19)       PAST MEDICAL & SURGICAL HISTORY:  Chronic CHF  COPD without exacerbation  Atrial fibrillation  Presence of Watchman left atrial appendage closure device  Hypertension  GERD (gastroesophageal reflux disease)  Chronic obstructive pulmonary disease (COPD)  Anemia  Falls  Meningitis  Collapsed lung  Alcohol withdrawal  Emphysema of lung  Cirrhosis  CHF (congestive heart failure)  Poor historian  Alcohol abuse  Chronic atrial fibrillation  Unilateral amputation of lower extremity below knee  Presence of Watchman left atrial appendage closure device  S/P BKA (below knee amputation) unilateral, left      FAMILY HISTORY:  No pertinent family history in first degree relatives  Family history unknown: Adopted      Social Hx:    Allergies    Ceclor (Rash)  Duricef (Rash)    Intolerances            ICU Vital Signs Last 24 Hrs  T(C): 38.2 (18 May 2020 09:00), Max: 40.3 (18 May 2020 06:23)  T(F): 100.8 (18 May 2020 09:00), Max: 104.5 (18 May 2020 06:23)  HR: 124 (18 May 2020 10:00) (77 - 124)  BP: 119/59 (18 May 2020 10:00) (82/48 - 129/66)  BP(mean): 71 (18 May 2020 10:00) (52 - 80)  ABP: --  ABP(mean): --  RR: 35 (18 May 2020 10:00) (16 - 35)  SpO2: 97% (18 May 2020 10:00) (81% - 100%)      Mode: AC/ CMV (Assist Control/ Continuous Mandatory Ventilation)  RR (machine): 15  TV (machine): 450  FiO2: 40  PEEP: 5  ITime: 1  MAP: 15  PIP: 32      I&O's Summary    17 May 2020 07:01  -  18 May 2020 07:00  --------------------------------------------------------  IN: 1002.3 mL / OUT: 825 mL / NET: 177.3 mL                              10.2   6.22  )-----------( 207      ( 18 May 2020 08:11 )             31.7       05-18    139  |  104  |  11  ----------------------------<  114<H>  3.4<L>   |  28  |  0.63    Ca    8.1<L>      18 May 2020 08:11  Phos  2.6     05-18  Mg     1.6     05-18    TPro  6.7  /  Alb  1.9<L>  /  TBili  1.5<H>  /  DBili  x   /  AST  24  /  ALT  35  /  AlkPhos  143<H>  05-17                Urinalysis Basic - ( 18 May 2020 09:45 )    Color: Brown / Appearance: very cloudy / S.010 / pH: x  Gluc: x / Ketone: Moderate  / Bili: Moderate / Urobili: 12 mg/dL   Blood: x / Protein: 30 mg/dL / Nitrite: Positive   Leuk Esterase: Small / RBC: x / WBC x   Sq Epi: x / Non Sq Epi: x / Bacteria: x        MEDICATIONS  (STANDING):  ALBUTerol    90 MICROgram(s) HFA Inhaler 2 Puff(s) Inhalation every 4 hours  aztreonam  IVPB 2000 milliGRAM(s) IV Intermittent once  aztreonam  IVPB 2000 milliGRAM(s) IV Intermittent every 8 hours  aztreonam  IVPB      budesonide 160 MICROgram(s)/formoterol 4.5 MICROgram(s) Inhaler 2 Puff(s) Inhalation two times a day  chlorhexidine 0.12% Liquid 15 milliLiter(s) Oral Mucosa every 12 hours  dexMEDEtomidine Infusion 0.7 MICROgram(s)/kG/Hr (20.6 mL/Hr) IV Continuous <Continuous>  diltiazem    Tablet 30 milliGRAM(s) Oral every 6 hours  enoxaparin Injectable 40 milliGRAM(s) SubCutaneous daily  gabapentin 400 milliGRAM(s) Oral three times a day  pantoprazole  Injectable 40 milliGRAM(s) IV Push daily  senna 2 Tablet(s) Oral at bedtime  thiamine 100 milliGRAM(s) Oral daily  tiotropium 18 MICROgram(s) Capsule 1 Capsule(s) Inhalation daily  vancomycin  IVPB 1000 milliGRAM(s) IV Intermittent once    MEDICATIONS  (PRN):  acetaminophen   Tablet .. 650 milliGRAM(s) Oral every 6 hours PRN Temp greater or equal to 38.5C (101.3F)  ALPRAZolam 1 milliGRAM(s) Oral every 6 hours PRN anxiety  metoclopramide Injectable 10 milliGRAM(s) IV Push every 8 hours PRN Vomiting  polyethylene glycol 3350 17 Gram(s) Oral daily PRN Constipation      DVT Prophylaxis:    Advanced Directives:  Discussed with:    Visit Information: 30     ** Time is exclusive of billed procedures and/or teaching and/or routine family updates.

## 2020-05-19 LAB
ANION GAP SERPL CALC-SCNC: 7 MMOL/L — SIGNIFICANT CHANGE UP (ref 5–17)
BUN SERPL-MCNC: 14 MG/DL — SIGNIFICANT CHANGE UP (ref 7–23)
CALCIUM SERPL-MCNC: 8.1 MG/DL — LOW (ref 8.5–10.1)
CHLORIDE SERPL-SCNC: 106 MMOL/L — SIGNIFICANT CHANGE UP (ref 96–108)
CO2 SERPL-SCNC: 27 MMOL/L — SIGNIFICANT CHANGE UP (ref 22–31)
CREAT SERPL-MCNC: 0.58 MG/DL — SIGNIFICANT CHANGE UP (ref 0.5–1.3)
GLUCOSE SERPL-MCNC: 132 MG/DL — HIGH (ref 70–99)
GRAM STN FLD: SIGNIFICANT CHANGE UP
HCT VFR BLD CALC: 31.9 % — LOW (ref 39–50)
HGB BLD-MCNC: 10.5 G/DL — LOW (ref 13–17)
MAGNESIUM SERPL-MCNC: 1.5 MG/DL — LOW (ref 1.6–2.6)
MCHC RBC-ENTMCNC: 32.1 PG — SIGNIFICANT CHANGE UP (ref 27–34)
MCHC RBC-ENTMCNC: 32.9 GM/DL — SIGNIFICANT CHANGE UP (ref 32–36)
MCV RBC AUTO: 97.6 FL — SIGNIFICANT CHANGE UP (ref 80–100)
METHOD TYPE: SIGNIFICANT CHANGE UP
MSSA DNA SPEC QL NAA+PROBE: SIGNIFICANT CHANGE UP
PHOSPHATE SERPL-MCNC: 2.7 MG/DL — SIGNIFICANT CHANGE UP (ref 2.5–4.5)
PLATELET # BLD AUTO: 153 K/UL — SIGNIFICANT CHANGE UP (ref 150–400)
POTASSIUM SERPL-MCNC: 3.5 MMOL/L — SIGNIFICANT CHANGE UP (ref 3.5–5.3)
POTASSIUM SERPL-SCNC: 3.5 MMOL/L — SIGNIFICANT CHANGE UP (ref 3.5–5.3)
RBC # BLD: 3.27 M/UL — LOW (ref 4.2–5.8)
RBC # FLD: 13 % — SIGNIFICANT CHANGE UP (ref 10.3–14.5)
SODIUM SERPL-SCNC: 140 MMOL/L — SIGNIFICANT CHANGE UP (ref 135–145)
SPECIMEN SOURCE: SIGNIFICANT CHANGE UP
SPECIMEN SOURCE: SIGNIFICANT CHANGE UP
WBC # BLD: 4.31 K/UL — SIGNIFICANT CHANGE UP (ref 3.8–10.5)
WBC # FLD AUTO: 4.31 K/UL — SIGNIFICANT CHANGE UP (ref 3.8–10.5)

## 2020-05-19 PROCEDURE — 99232 SBSQ HOSP IP/OBS MODERATE 35: CPT

## 2020-05-19 PROCEDURE — 93306 TTE W/DOPPLER COMPLETE: CPT | Mod: 26

## 2020-05-19 PROCEDURE — 99291 CRITICAL CARE FIRST HOUR: CPT

## 2020-05-19 RX ORDER — VANCOMYCIN HCL 1 G
1000 VIAL (EA) INTRAVENOUS EVERY 12 HOURS
Refills: 0 | Status: DISCONTINUED | OUTPATIENT
Start: 2020-05-19 | End: 2020-05-22

## 2020-05-19 RX ORDER — VANCOMYCIN HCL 1 G
1750 VIAL (EA) INTRAVENOUS ONCE
Refills: 0 | Status: COMPLETED | OUTPATIENT
Start: 2020-05-19 | End: 2020-05-19

## 2020-05-19 RX ORDER — METOPROLOL TARTRATE 50 MG
5 TABLET ORAL ONCE
Refills: 0 | Status: COMPLETED | OUTPATIENT
Start: 2020-05-19 | End: 2020-05-19

## 2020-05-19 RX ORDER — MAGNESIUM SULFATE 500 MG/ML
2 VIAL (ML) INJECTION ONCE
Refills: 0 | Status: COMPLETED | OUTPATIENT
Start: 2020-05-19 | End: 2020-05-19

## 2020-05-19 RX ORDER — SODIUM CHLORIDE 9 MG/ML
1000 INJECTION INTRAMUSCULAR; INTRAVENOUS; SUBCUTANEOUS ONCE
Refills: 0 | Status: DISCONTINUED | OUTPATIENT
Start: 2020-05-19 | End: 2020-05-20

## 2020-05-19 RX ADMIN — Medication 30 MILLIGRAM(S): at 12:44

## 2020-05-19 RX ADMIN — GABAPENTIN 400 MILLIGRAM(S): 400 CAPSULE ORAL at 06:02

## 2020-05-19 RX ADMIN — DEXMEDETOMIDINE HYDROCHLORIDE IN 0.9% SODIUM CHLORIDE 20.6 MICROGRAM(S)/KG/HR: 4 INJECTION INTRAVENOUS at 20:22

## 2020-05-19 RX ADMIN — ALBUTEROL 2 PUFF(S): 90 AEROSOL, METERED ORAL at 00:43

## 2020-05-19 RX ADMIN — Medication 250 MILLIGRAM(S): at 18:49

## 2020-05-19 RX ADMIN — Medication 100 MILLIGRAM(S): at 12:44

## 2020-05-19 RX ADMIN — CHLORHEXIDINE GLUCONATE 15 MILLILITER(S): 213 SOLUTION TOPICAL at 06:03

## 2020-05-19 RX ADMIN — ALBUTEROL 2 PUFF(S): 90 AEROSOL, METERED ORAL at 21:07

## 2020-05-19 RX ADMIN — CHLORHEXIDINE GLUCONATE 15 MILLILITER(S): 213 SOLUTION TOPICAL at 18:50

## 2020-05-19 RX ADMIN — GABAPENTIN 400 MILLIGRAM(S): 400 CAPSULE ORAL at 13:53

## 2020-05-19 RX ADMIN — PANTOPRAZOLE SODIUM 40 MILLIGRAM(S): 20 TABLET, DELAYED RELEASE ORAL at 12:44

## 2020-05-19 RX ADMIN — ALBUTEROL 2 PUFF(S): 90 AEROSOL, METERED ORAL at 13:07

## 2020-05-19 RX ADMIN — Medication 30 MILLIGRAM(S): at 18:50

## 2020-05-19 RX ADMIN — Medication 100 MILLIGRAM(S): at 09:10

## 2020-05-19 RX ADMIN — Medication 5 MILLIGRAM(S): at 23:00

## 2020-05-19 RX ADMIN — Medication 50 GRAM(S): at 16:28

## 2020-05-19 RX ADMIN — ENOXAPARIN SODIUM 40 MILLIGRAM(S): 100 INJECTION SUBCUTANEOUS at 12:44

## 2020-05-19 RX ADMIN — BUDESONIDE AND FORMOTEROL FUMARATE DIHYDRATE 2 PUFF(S): 160; 4.5 AEROSOL RESPIRATORY (INHALATION) at 09:29

## 2020-05-19 RX ADMIN — Medication 250 MILLIGRAM(S): at 06:12

## 2020-05-19 RX ADMIN — Medication 100 MILLIGRAM(S): at 00:00

## 2020-05-19 RX ADMIN — POLYETHYLENE GLYCOL 3350 17 GRAM(S): 17 POWDER, FOR SOLUTION ORAL at 12:47

## 2020-05-19 RX ADMIN — SENNA PLUS 2 TABLET(S): 8.6 TABLET ORAL at 00:00

## 2020-05-19 RX ADMIN — BUDESONIDE AND FORMOTEROL FUMARATE DIHYDRATE 2 PUFF(S): 160; 4.5 AEROSOL RESPIRATORY (INHALATION) at 21:08

## 2020-05-19 RX ADMIN — Medication 1 MILLIGRAM(S): at 01:35

## 2020-05-19 RX ADMIN — Medication 30 MILLIGRAM(S): at 23:47

## 2020-05-19 RX ADMIN — GABAPENTIN 400 MILLIGRAM(S): 400 CAPSULE ORAL at 23:47

## 2020-05-19 RX ADMIN — ALBUTEROL 2 PUFF(S): 90 AEROSOL, METERED ORAL at 05:30

## 2020-05-19 RX ADMIN — ALBUTEROL 2 PUFF(S): 90 AEROSOL, METERED ORAL at 09:29

## 2020-05-19 RX ADMIN — Medication 30 MILLIGRAM(S): at 06:02

## 2020-05-19 RX ADMIN — ALBUTEROL 2 PUFF(S): 90 AEROSOL, METERED ORAL at 17:22

## 2020-05-19 NOTE — PROGRESS NOTE ADULT - SUBJECTIVE AND OBJECTIVE BOX
Patient is a 65y old  Male who presents with a chief complaint of acute hypoxemic, hypercapneic resp failure  COPD exacerbation (18 May 2020 13:46)      BRIEF HOSPITAL COURSE: ***    Events last 24 hours: ***    PAST MEDICAL & SURGICAL HISTORY:  Chronic CHF  COPD without exacerbation  Atrial fibrillation  Presence of Watchman left atrial appendage closure device  Hypertension  GERD (gastroesophageal reflux disease)  Chronic obstructive pulmonary disease (COPD)  Anemia  Falls  Meningitis  Collapsed lung  Alcohol withdrawal  Emphysema of lung  Cirrhosis  CHF (congestive heart failure)  Poor historian  Alcohol abuse  Chronic atrial fibrillation  Unilateral amputation of lower extremity below knee  Presence of Watchman left atrial appendage closure device  S/P BKA (below knee amputation) unilateral, left    Allergies    Ceclor (Rash)  Duricef (Rash)    Intolerances      FAMILY HISTORY:  No pertinent family history in first degree relatives  Family history unknown: Adopted      Social History:     Review of Systems:  CONSTITUTIONAL: No fever, chills, or fatigue  EYES: No eye pain, visual disturbances, or discharge  ENMT:  No difficulty hearing, tinnitus, vertigo; No sinus or throat pain  NECK: No pain or stiffness  RESPIRATORY: No cough, wheezing, chills or hemoptysis; No shortness of breath  CARDIOVASCULAR: No chest pain, palpitations, dizziness, or leg swelling  GASTROINTESTINAL: No abdominal or epigastric pain. No nausea, vomiting, or hematemesis; No diarrhea or constipation. No melena or hematochezia.  GENITOURINARY: No dysuria, frequency, hematuria, or incontinence  NEUROLOGICAL: No headaches, memory loss, loss of strength, numbness, or tremors  SKIN: No itching, burning, rashes, or lesions   MUSCULOSKELETAL: No joint pain or swelling; No muscle, back, or extremity pain  PSYCHIATRIC: No depression, anxiety, mood swings, or difficulty sleeping      Physical Examination:    General: No acute distress.      HEENT: Pupils equal, reactive to light.  Symmetric.    PULM: Clear to auscultation bilaterally, no significant sputum production    CVS: Regular rate and rhythm, no murmurs, rubs, or gallops    ABD: Soft, nondistended, nontender, normoactive bowel sounds, no masses    EXT: Non pitting edema, left BKA    SKIN: Warm and well perfused, no rashes noted.    NEURO: Alert, oriented, interactive, nonfocal      Medications:  aztreonam  IVPB 2000 milliGRAM(s) IV Intermittent every 8 hours  aztreonam  IVPB      vancomycin  IVPB 1750 milliGRAM(s) IV Intermittent once  diltiazem    Tablet 30 milliGRAM(s) Oral every 6 hours  ALBUTerol    90 MICROgram(s) HFA Inhaler 2 Puff(s) Inhalation every 4 hours  budesonide 160 MICROgram(s)/formoterol 4.5 MICROgram(s) Inhaler 2 Puff(s) Inhalation two times a day  tiotropium 18 MICROgram(s) Capsule 1 Capsule(s) Inhalation daily  acetaminophen   Tablet .. 650 milliGRAM(s) Oral every 6 hours PRN  ALPRAZolam 1 milliGRAM(s) Oral every 6 hours PRN  dexMEDEtomidine Infusion 0.7 MICROgram(s)/kG/Hr IV Continuous <Continuous>  gabapentin 400 milliGRAM(s) Oral three times a day  metoclopramide Injectable 10 milliGRAM(s) IV Push every 8 hours PRN  enoxaparin Injectable 40 milliGRAM(s) SubCutaneous daily  pantoprazole  Injectable 40 milliGRAM(s) IV Push daily  polyethylene glycol 3350 17 Gram(s) Oral daily  senna 2 Tablet(s) Oral at bedtime  lactated ringers. 1000 milliLiter(s) IV Continuous <Continuous>  lactated ringers. 1000 milliLiter(s) IV Continuous <Continuous>  thiamine 100 milliGRAM(s) Oral daily  chlorhexidine 0.12% Liquid 15 milliLiter(s) Oral Mucosa every 12 hours      Mode: AC/ CMV (Assist Control/ Continuous Mandatory Ventilation)  RR (machine): 15  TV (machine): 450  FiO2: 40  PEEP: 5  ITime: 1  MAP: 16  PIP: 28      ICU Vital Signs Last 24 Hrs  T(C): 38.7 (19 May 2020 00:17), Max: 40.3 (18 May 2020 06:23)  T(F): 101.6 (19 May 2020 00:17), Max: 104.5 (18 May 2020 06:23)  HR: 81 (19 May 2020 05:00) (68 - 124)  BP: 91/54 (19 May 2020 05:00) (45/23 - 119/59)  BP(mean): 63 (19 May 2020 05:00) (29 - 71)  ABP: --  ABP(mean): --  RR: 18 (19 May 2020 05:00) (16 - 35)  SpO2: 99% (19 May 2020 05:00) (88% - 100%)    Vital Signs Last 24 Hrs  T(C): 38.7 (19 May 2020 00:17), Max: 40.3 (18 May 2020 06:23)  T(F): 101.6 (19 May 2020 00:17), Max: 104.5 (18 May 2020 06:23)  HR: 81 (19 May 2020 05:00) (68 - 124)  BP: 91/54 (19 May 2020 05:00) (45/23 - 119/59)  BP(mean): 63 (19 May 2020 05:00) (29 - 71)  RR: 18 (19 May 2020 05:00) (16 - 35)  SpO2: 99% (19 May 2020 05:00) (88% - 100%)      I&O's Detail    17 May 2020 07:01  -  18 May 2020 07:00  --------------------------------------------------------  IN:    dexmedetomidine Infusion: 462.3 mL    ns in tub fed  [id number]: 540 mL    Sodium Chloride 0.9% IV Bolus: 1000 mL  Total IN: 2002.3 mL    OUT:    Incontinent per Condom Catheter: 825 mL  Total OUT: 825 mL  Total NET: 1177.3 mL      18 May 2020 07:01  -  19 May 2020 05:47  --------------------------------------------------------  IN:    dexmedetomidine Infusion: 333 mL    IV PiggyBack: 450 mL    ns in tub fed  [id number]: 540 mL    Sodium Chloride 0.9% IV Bolus: 2000 mL  Total IN: 3323 mL    OUT:    Incontinent per Condom Catheter: 150 mL  Total OUT: 150 mL  Total NET: 3173 mL      LABS:                        10.2   6.22  )-----------( 207      ( 18 May 2020 08:11 )             31.7     05-18    139  |  104  |  11  ----------------------------<  114<H>  3.4<L>   |  28  |  0.63    Ca    8.1<L>      18 May 2020 08:11  Phos  2.6       Mg     1.6         TPro  6.7  /  Alb  1.9<L>  /  TBili  1.5<H>  /  DBili  x   /  AST  24  /  ALT  35  /  AlkPhos  143<H>        Urinalysis Basic - ( 18 May 2020 09:45 )  Color: Brown / Appearance: very cloudy / S.010 / pH: x  Gluc: x / Ketone: Moderate  / Bili: Moderate / Urobili: 12 mg/dL   Blood: x / Protein: 30 mg/dL / Nitrite: Positive   Leuk Esterase: Small / RBC: Negative /HPF / WBC 0-2   Sq Epi: x / Non Sq Epi: x / Bacteria: Moderate      CULTURES:  Culture Results:   Growth in aerobic bottle: Gram Positive Cocci in Clusters ( @ 11:53)  Culture Results:   Growth in aerobic bottle: Gram Positive Cocci in Clusters  ***Blood Panel PCR results on this specimen are available  approximately 3 hours after the Gram stain result.***  Gram stain, PCR, and/or culture results may not always  correspond due to difference in methodologies.  ************************************************************  This PCR assay was performed using [employer].  The following targets are tested for: Enterococcus,  vancomycin resistant enterococci, Listeria monocytogenes,  coagulase negative staphylococci, S. aureus,  methicillin resistant S. aureus, Streptococcus agalactiae  (Group B), S. pneumoniae, S. pyogenes (Group A),  Acinetobacter baumannii, Enterobacter cloacae, E. coli,  Klebsiella oxytoca, K. pneumoniae, Proteus sp.,  Serratia marcescens, Haemophilus influenzae,  Neisseria meningitidis, Pseudomonas aeruginosa, Candida  albicans, C. glabrata, C krusei, C parapsilosis,  C. tropicalis and the KPC resistance gene.  "Due to technical problems, Proteus sp. will Not be reported as part of  the BCID panel until further notice" ( @ 11:44)      RADIOLOGY:   < from: Xray Chest 1 View- PORTABLE-Urgent (20 @ 09:49) >  EXAM:  XR CHEST PORTABLE URGENT 1V                            PROCEDURE DATE:  2020      INTERPRETATION:  PROCEDURE: AP view of the chest.    CLINICAL INFORMATION: Respiratory distress.    COMPARISON: 5/15/2020.    FINDINGS:     A tracheostomy tube is noted.    Lungs: There are no lung consolidations.  Heart: There is cardiomegaly.  Mediastinum: The mediastinum is within normal limits.    IMPRESSION:    No lung consolidations.    RUPA STREETER M.D., ATTENDING RADIOLOGIST  This document has been electronically signed. May 18 2020 10:29AM    < end of copied text >      SUPPLEMENTAL O2: AC/VC  LINES: Peripheral   IVF: N  NOVAK: Y  PPx: PPI, Lovenox   CONTACT: NELSON Patient is a 65y old  Male who presents with a chief complaint of acute hypoxemic, hypercapneic resp failure  COPD exacerbation (18 May 2020 13:46)      BRIEF HOSPITAL COURSE:   66 yo male with PMHx of EtOH abuse/withdrawal, cAF sp Watchman, HTN, CVA, smoker, COPD, HFpEF, GERD, cirrhosis, LBKA  admitted on 2020 with  acute on chronic hypercapnic & hypoxemic respiratory failure due to exacerbation of COPD and/or bronchitis. Course complicated by EtOH withdrawal, MRSA and CRP PNA.    Events last 24 hours:   blood cultures positive for staph aureus.       PAST MEDICAL & SURGICAL HISTORY:  Chronic CHF  COPD without exacerbation  Atrial fibrillation  Presence of Watchman left atrial appendage closure device  Hypertension  GERD (gastroesophageal reflux disease)  Chronic obstructive pulmonary disease (COPD)  Anemia  Falls  Meningitis  Collapsed lung  Alcohol withdrawal  Emphysema of lung  Cirrhosis  CHF (congestive heart failure)  Poor historian  Alcohol abuse  Chronic atrial fibrillation  Unilateral amputation of lower extremity below knee  Presence of Watchman left atrial appendage closure device  S/P BKA (below knee amputation) unilateral, left    Allergies    Ceclor (Rash)  Duricef (Rash)    Intolerances      FAMILY HISTORY:  No pertinent family history in first degree relatives  Family history unknown: Adopted      Social History:     Review of Systems:  CONSTITUTIONAL: No fever, chills, or fatigue  EYES: No eye pain, visual disturbances, or discharge  ENMT:  No difficulty hearing, tinnitus, vertigo; No sinus or throat pain  NECK: No pain or stiffness  RESPIRATORY: No cough, wheezing, chills or hemoptysis; No shortness of breath  CARDIOVASCULAR: No chest pain, palpitations, dizziness, or leg swelling  GASTROINTESTINAL: No abdominal or epigastric pain. No nausea, vomiting, or hematemesis; No diarrhea or constipation. No melena or hematochezia.  GENITOURINARY: No dysuria, frequency, hematuria, or incontinence  NEUROLOGICAL: No headaches, memory loss, loss of strength, numbness, or tremors  SKIN: No itching, burning, rashes, or lesions   MUSCULOSKELETAL: No joint pain or swelling; No muscle, back, or extremity pain  PSYCHIATRIC: No depression, anxiety, mood swings, or difficulty sleeping      Physical Examination:    General: No acute distress.      HEENT: Pupils equal, reactive to light.  Symmetric.    PULM: Clear to auscultation bilaterally, no significant sputum production    CVS: Regular rate and rhythm, no murmurs, rubs, or gallops    ABD: Soft, nondistended, nontender, normoactive bowel sounds, no masses    EXT: Non pitting edema, left BKA    SKIN: Warm and well perfused, no rashes noted.    NEURO: Alert, oriented, interactive, nonfocal      Medications:  aztreonam  IVPB 2000 milliGRAM(s) IV Intermittent every 8 hours  aztreonam  IVPB      vancomycin  IVPB 1750 milliGRAM(s) IV Intermittent once  diltiazem    Tablet 30 milliGRAM(s) Oral every 6 hours  ALBUTerol    90 MICROgram(s) HFA Inhaler 2 Puff(s) Inhalation every 4 hours  budesonide 160 MICROgram(s)/formoterol 4.5 MICROgram(s) Inhaler 2 Puff(s) Inhalation two times a day  tiotropium 18 MICROgram(s) Capsule 1 Capsule(s) Inhalation daily  acetaminophen   Tablet .. 650 milliGRAM(s) Oral every 6 hours PRN  ALPRAZolam 1 milliGRAM(s) Oral every 6 hours PRN  dexMEDEtomidine Infusion 0.7 MICROgram(s)/kG/Hr IV Continuous <Continuous>  gabapentin 400 milliGRAM(s) Oral three times a day  metoclopramide Injectable 10 milliGRAM(s) IV Push every 8 hours PRN  enoxaparin Injectable 40 milliGRAM(s) SubCutaneous daily  pantoprazole  Injectable 40 milliGRAM(s) IV Push daily  polyethylene glycol 3350 17 Gram(s) Oral daily  senna 2 Tablet(s) Oral at bedtime  lactated ringers. 1000 milliLiter(s) IV Continuous <Continuous>  lactated ringers. 1000 milliLiter(s) IV Continuous <Continuous>  thiamine 100 milliGRAM(s) Oral daily  chlorhexidine 0.12% Liquid 15 milliLiter(s) Oral Mucosa every 12 hours      Mode: AC/ CMV (Assist Control/ Continuous Mandatory Ventilation)  RR (machine): 15  TV (machine): 450  FiO2: 40  PEEP: 5  ITime: 1  MAP: 16  PIP: 28      ICU Vital Signs Last 24 Hrs  T(C): 38.7 (19 May 2020 00:17), Max: 40.3 (18 May 2020 06:23)  T(F): 101.6 (19 May 2020 00:17), Max: 104.5 (18 May 2020 06:23)  HR: 81 (19 May 2020 05:00) (68 - 124)  BP: 91/54 (19 May 2020 05:00) (45/23 - 119/59)  BP(mean): 63 (19 May 2020 05:00) (29 - 71)  ABP: --  ABP(mean): --  RR: 18 (19 May 2020 05:00) (16 - 35)  SpO2: 99% (19 May 2020 05:00) (88% - 100%)    Vital Signs Last 24 Hrs  T(C): 38.7 (19 May 2020 00:17), Max: 40.3 (18 May 2020 06:23)  T(F): 101.6 (19 May 2020 00:17), Max: 104.5 (18 May 2020 06:23)  HR: 81 (19 May 2020 05:00) (68 - 124)  BP: 91/54 (19 May 2020 05:00) (45/ - 119/59)  BP(mean): 63 (19 May 2020 05:00) (29 - 71)  RR: 18 (19 May 2020 05:00) (16 - 35)  SpO2: 99% (19 May 2020 05:00) (88% - 100%)      I&O's Detail    17 May 2020 07:01  -  18 May 2020 07:00  --------------------------------------------------------  IN:    dexmedetomidine Infusion: 462.3 mL    ns in tub fed  [id number]: 540 mL    Sodium Chloride 0.9% IV Bolus: 1000 mL  Total IN: 2002.3 mL    OUT:    Incontinent per Condom Catheter: 825 mL  Total OUT: 825 mL  Total NET: 1177.3 mL      18 May 2020 07:01  -  19 May 2020 05:47  --------------------------------------------------------  IN:    dexmedetomidine Infusion: 333 mL    IV PiggyBack: 450 mL    ns in tub fed  [id number]: 540 mL    Sodium Chloride 0.9% IV Bolus: 2000 mL  Total IN: 3323 mL    OUT:    Incontinent per Condom Catheter: 150 mL  Total OUT: 150 mL  Total NET: 3173 mL      LABS:                        10.2   6.22  )-----------( 207      ( 18 May 2020 08:11 )             31.7         139  |  104  |  11  ----------------------------<  114<H>  3.4<L>   |  28  |  0.63    Ca    8.1<L>      18 May 2020 08:11  Phos  2.6       Mg     1.6         TPro  6.7  /  Alb  1.9<L>  /  TBili  1.5<H>  /  DBili  x   /  AST  24  /  ALT  35  /  AlkPhos  143<H>        Urinalysis Basic - ( 18 May 2020 09:45 )  Color: Brown / Appearance: very cloudy / S.010 / pH: x  Gluc: x / Ketone: Moderate  / Bili: Moderate / Urobili: 12 mg/dL   Blood: x / Protein: 30 mg/dL / Nitrite: Positive   Leuk Esterase: Small / RBC: Negative /HPF / WBC 0-2   Sq Epi: x / Non Sq Epi: x / Bacteria: Moderate      CULTURES:  Culture Results:   Growth in aerobic bottle: Gram Positive Cocci in Clusters ( @ 11:53)  Culture Results:   Growth in aerobic bottle: Gram Positive Cocci in Clusters  ***Blood Panel PCR results on this specimen are available  approximately 3 hours after the Gram stain result.***  Gram stain, PCR, and/or culture results may not always  correspond due to difference in methodologies.  ************************************************************  This PCR assay was performed using [employer].  The following targets are tested for: Enterococcus,  vancomycin resistant enterococci, Listeria monocytogenes,  coagulase negative staphylococci, S. aureus,  methicillin resistant S. aureus, Streptococcus agalactiae  (Group B), S. pneumoniae, S. pyogenes (Group A),  Acinetobacter baumannii, Enterobacter cloacae, E. coli,  Klebsiella oxytoca, K. pneumoniae, Proteus sp.,  Serratia marcescens, Haemophilus influenzae,  Neisseria meningitidis, Pseudomonas aeruginosa, Candida  albicans, C. glabrata, C krusei, C parapsilosis,  C. tropicalis and the KPC resistance gene.  "Due to technical problems, Proteus sp. will Not be reported as part of  the BCID panel until further notice" ( @ 11:44)      RADIOLOGY:   < from: Xray Chest 1 View- PORTABLE-Urgent (20 @ 09:49) >  EXAM:  XR CHEST PORTABLE URGENT 1V                            PROCEDURE DATE:  2020      INTERPRETATION:  PROCEDURE: AP view of the chest.    CLINICAL INFORMATION: Respiratory distress.    COMPARISON: 5/15/2020.    FINDINGS:     A tracheostomy tube is noted.    Lungs: There are no lung consolidations.  Heart: There is cardiomegaly.  Mediastinum: The mediastinum is within normal limits.    IMPRESSION:    No lung consolidations.    RUPA STREETER M.D., ATTENDING RADIOLOGIST  This document has been electronically signed. May 18 2020 10:29AM    < end of copied text >      SUPPLEMENTAL O2: AC/VC  LINES: Peripheral   IVF: N  NOVAK: Y  PPx: PPI, Lovenox   CONTACT: NELSON

## 2020-05-19 NOTE — PROGRESS NOTE ADULT - ASSESSMENT
66 y/o M with history of hypercapnic respiratory failure Trach/PEG in 2015 and 2017,  now intubated from hypercapnic respiratory failure , COVID neg. S/P Trach and PEG 5/15. Now with fevers, on Vanco. Staph bacteremia.     Trach and PEG care  may resume home RX ASA and plavix for Hx of CVA? when stable  C/W ABX as per ID  FU final cultures and sensitivity       Thu Veloz PA  Thoracic [facility]  #[id number]

## 2020-05-19 NOTE — PROGRESS NOTE ADULT - ASSESSMENT
A/P: 65 male with acute respiratory failure from altered awareness and sedation  COPD  AFIB s/p watchman  CHF  HTN  ETOH abuse    Plan:   PULM: S/P Trach and PEG on 5/15.  No weaning today.  Now on xanax atc, try to wean off the precedex.      Cardio: Continue ASA and Plavix     Renal: Cr. Stable    GI: Feeds, PPI    ID: MRSA in BC from 5/18, GNR in sputum.  Will d/w ID    PSY: Thiamine for chronic ETOH abuse and thiamine deficiency folate    Lovenox DVT Prophylaxis    D/W the patients daughter on a daily basis

## 2020-05-19 NOTE — PROGRESS NOTE ADULT - SUBJECTIVE AND OBJECTIVE BOX
Date of service: 05-19-20 @ 11:04    Asked to evaluate for new bacteremia  Intubated on ventilatory support  Febrile to 102F  Lethargic    ROS: no fever or chills; denies dizziness, no HA, no SOB or cough, no abdominal pain, no diarrhea or constipation; no dysuria, no legs pain, no rashes    MEDICATIONS  (STANDING):  ALBUTerol    90 MICROgram(s) HFA Inhaler 2 Puff(s) Inhalation every 4 hours  aztreonam  IVPB 2000 milliGRAM(s) IV Intermittent every 8 hours  aztreonam  IVPB      budesonide 160 MICROgram(s)/formoterol 4.5 MICROgram(s) Inhaler 2 Puff(s) Inhalation two times a day  chlorhexidine 0.12% Liquid 15 milliLiter(s) Oral Mucosa every 12 hours  dexMEDEtomidine Infusion 0.7 MICROgram(s)/kG/Hr (20.6 mL/Hr) IV Continuous <Continuous>  diltiazem    Tablet 30 milliGRAM(s) Oral every 6 hours  enoxaparin Injectable 40 milliGRAM(s) SubCutaneous daily  gabapentin 400 milliGRAM(s) Oral three times a day  lactated ringers. 1000 milliLiter(s) (1000 mL/Hr) IV Continuous <Continuous>  lactated ringers. 1000 milliLiter(s) (100 mL/Hr) IV Continuous <Continuous>  pantoprazole  Injectable 40 milliGRAM(s) IV Push daily  polyethylene glycol 3350 17 Gram(s) Oral daily  senna 2 Tablet(s) Oral at bedtime  thiamine 100 milliGRAM(s) Oral daily  tiotropium 18 MICROgram(s) Capsule 1 Capsule(s) Inhalation daily      Vital Signs Last 24 Hrs  T(C): 36.6 (19 May 2020 10:18), Max: 38.9 (18 May 2020 13:00)  T(F): 97.9 (19 May 2020 10:18), Max: 102 (18 May 2020 13:00)  HR: 78 (19 May 2020 09:26) (68 - 113)  BP: 96/50 (19 May 2020 08:00) (66/10 - 117/51)  BP(mean): 60 (19 May 2020 08:00) (25 - 68)  RR: 20 (19 May 2020 08:00) (16 - 31)  SpO2: 99% (19 May 2020 09:26) (88% - 100%)    Mode: AC/ CMV (Assist Control/ Continuous Mandatory Ventilation), RR (machine): 15, TV (machine): 450, FiO2: 40, PEEP: 5, ITime: 1, MAP: 12, PIP: 30    Physical exam:    Constitutional:  No acute distress  HEENT: NC/AT, EOMI, PERRLA, conjunctivae clear  Neck: supple; thyroid not palpable  Back: no tenderness  Respiratory: respiratory effort normal; b/l rhonchi  Cardiovascular: S1S2 regular, no murmurs  Abdomen: soft, not tender, not distended, positive BS  Genitourinary: no suprapubic tenderness  Lymphatic: no LN palpable  Musculoskeletal: no muscle tenderness, no joint swelling or tenderness  Extremities: no pedal edema  Neurological/ Psychiatric: confused; moving all extremities  Skin: no rashes; no palpable lesions    Labs: reviewed                        10.5 4.31  )-----------( 153      ( 19 May 2020 07:02 )             31.9     05-19    140  |  106  |  14  ----------------------------<  132<H>  3.5   |  27  |  0.58    Ca    8.1<L>      19 May 2020 07:02  Phos  2.7     05-19  Mg     1.5     05-19                        12.5   8.31  )-----------( 197      ( 07 May 2020 08:15 )             37.6     05-07    137  |  100  |  33<H>  ----------------------------<  114<H>  4.9   |  31  |  0.83    Ca    8.5      07 May 2020 08:15  Phos  4.2     05-07  Mg     2.4     05-07      COVID-19 PCR . (04.26.20 @ 00:02)    COVID-19 PCR: NotDetec      Culture - Blood (collected 26 Apr 2020 01:01)  Source: .Blood None  Preliminary Report (27 Apr 2020 10:02):    No growth to date.    Culture - Blood (collected 26 Apr 2020 00:56)  Source: .Blood None  Preliminary Report (27 Apr 2020 10:02):    No growth to date.    Culture - Sputum (collected 25 Apr 2020 01:00)  Source: .Sputum Sputum  trap  Gram Stain (26 Apr 2020 12:29):    Moderate polymorphonuclear leukocytes per low power field    No Squamous epithelial cells per low power field    Rare Gram Positive Rods per oil power field    Rare Gram Positive Cocci in Pairs and Chains per oil power field  Final Report (28 Apr 2020 10:53):    Few Pseudomonas aeruginosa (Carbapenem Resistant)    Moderate Methicillin resistant Staphylococcus aureus    Normal Respiratory Faith present  Organism: Pseudomonas aeruginosa (Carbapenem Resistant)  Methicillin resistant Staphylococcus aureus (28 Apr 2020 10:44)  Organism: Pseudomonas aeruginosa (Carbapenem Resistant) (28 Apr 2020 10:44)      -  Amikacin: S <=16      -  Aztreonam: R >16      -  Cefepime: R >16      -  Ceftazidime: R >16      -  Ciprofloxacin: S <=0.25      -  Gentamicin: S 4      -  Imipenem: R >8      -  Levofloxacin: S <=0.5      -  Meropenem: R >8      -  Piperacillin/Tazobactam: R >64      -  Tobramycin: S <=2      Method Type: LADY  Organism: Methicillin resistant Staphylococcus aureus (28 Apr 2020 10:40)      -  Amoxicillin/Clavulanic Acid: R >4/2      -  Ampicillin: R >8      -  Ampicillin/Sulbactam: R <=8/4      -  Cefazolin: R >16      -  Ceftriaxone: R >32      -  Ciprofloxacin: R >2      -  Clindamycin: R >4      -  Daptomycin: S 0.5      -  Erythromycin: R >4      -  Gentamicin: S <=1 Should not be used as monotherapy      -  Levofloxacin: R >4      -  Linezolid: S 4      -  Meropenem: R >8      -  Moxifloxacin(Aerobic): R >4      -  Oxacillin: R >2      -  Penicillin: R >8      -  RIF- Rifampin: S <=1 Should not be used as monotherapy      -  Tetra/Doxy: S 2      -  Trimethoprim/Sulfamethoxazole: S <=0.5/9.5      -  Vancomycin: S 2      Method Type: LADY    Culture - Urine (collected 21 Apr 2020 15:51)  Source: .Urine Clean Catch (Midstream)  Final Report (22 Apr 2020 17:03):    <10,000 CFU/mL Normal Urogenital Faith    Culture - Blood (collected 18 Apr 2020 19:43)  Source: .Blood Blood-Peripheral  Final Report (24 Apr 2020 01:01):    No Growth Final    Culture - Sputum . (04.29.20 @ 15:00)    -  Trimethoprim/Sulfamethoxazole: S <=0.5/9.5    -  Vancomycin: S 2    -  Tetra/Doxy: S 2    -  Gentamicin: S <=1 Should not be used as monotherapy    -  Erythromycin: R >4    -  Clindamycin: R >4    -  Cefazolin: R >16    -  Linezolid: S 4    -  Oxacillin: R >2    -  Penicillin: R >8    -  RIF- Rifampin: S <=1 Should not be used as monotherapy    -  Ampicillin/Sulbactam: R <=8/4    Gram Stain:   Numerous polymorphonuclear leukocytes per low power field  No Squamous epithelial cells per low power field  Numerous Gram Positive Cocci in Clusters per oil power field  Few Gram Positive Rods per oil power field  Few Gram Negative Rods per oil power field    Specimen Source: .Sputum Sputum    Culture Results:   Numerous Methicillin resistant Staphylococcus aureus  Normal Respiratory Faith present    Organism Identification: Methicillin resistant Staphylococcus aureus    Organism: Methicillin resistant Staphylococcus aureus    Method Type: LADY    Culture - Blood (05.18.20 @ 11:53)    Gram Stain:   Growth in aerobic bottle: Gram Positive Cocci in Clusters  Growth in anaerobic bottle: Gram Positive Cocci in Clusters    Specimen Source: .Blood Blood    Culture Results:   Growth in aerobic bottle: Gram Positive Cocci in Clusters  Growth in anaerobic bottle: Gram Positive Cocci in Clusters      Radiology: all available radiological tests reviewed    < from: Xray Chest 1 View- PORTABLE-Urgent (05.18.20 @ 09:49) >  A tracheostomy tube is noted.  Lungs: There are no lung consolidations.  Heart: There is cardiomegaly.  Mediastinum: The mediastinum is within normal limits.    < end of copied text >      Advanced directives addressed: full resuscitation Date of service: 05-19-20 @ 11:04    Asked to evaluate for new bacteremia  Intubated on ventilatory support  Febrile to 102F  Lethargic    ROS: no fever or chills; denies dizziness, no HA, no SOB or cough, no abdominal pain, no diarrhea or constipation; no dysuria, no legs pain, no rashes    MEDICATIONS  (STANDING):  ALBUTerol    90 MICROgram(s) HFA Inhaler 2 Puff(s) Inhalation every 4 hours  aztreonam  IVPB 2000 milliGRAM(s) IV Intermittent every 8 hours  aztreonam  IVPB      budesonide 160 MICROgram(s)/formoterol 4.5 MICROgram(s) Inhaler 2 Puff(s) Inhalation two times a day  chlorhexidine 0.12% Liquid 15 milliLiter(s) Oral Mucosa every 12 hours  dexMEDEtomidine Infusion 0.7 MICROgram(s)/kG/Hr (20.6 mL/Hr) IV Continuous <Continuous>  diltiazem    Tablet 30 milliGRAM(s) Oral every 6 hours  enoxaparin Injectable 40 milliGRAM(s) SubCutaneous daily  gabapentin 400 milliGRAM(s) Oral three times a day  lactated ringers. 1000 milliLiter(s) (1000 mL/Hr) IV Continuous <Continuous>  lactated ringers. 1000 milliLiter(s) (100 mL/Hr) IV Continuous <Continuous>  pantoprazole  Injectable 40 milliGRAM(s) IV Push daily  polyethylene glycol 3350 17 Gram(s) Oral daily  senna 2 Tablet(s) Oral at bedtime  thiamine 100 milliGRAM(s) Oral daily  tiotropium 18 MICROgram(s) Capsule 1 Capsule(s) Inhalation daily      Vital Signs Last 24 Hrs  T(C): 36.6 (19 May 2020 10:18), Max: 38.9 (18 May 2020 13:00)  T(F): 97.9 (19 May 2020 10:18), Max: 102 (18 May 2020 13:00)  HR: 78 (19 May 2020 09:26) (68 - 113)  BP: 96/50 (19 May 2020 08:00) (66/10 - 117/51)  BP(mean): 60 (19 May 2020 08:00) (25 - 68)  RR: 20 (19 May 2020 08:00) (16 - 31)  SpO2: 99% (19 May 2020 09:26) (88% - 100%)    Mode: AC/ CMV (Assist Control/ Continuous Mandatory Ventilation), RR (machine): 15, TV (machine): 450, FiO2: 40, PEEP: 5, ITime: 1, MAP: 12, PIP: 30    Physical exam:    Constitutional:  No acute distress  HEENT: NC/AT, EOMI, PERRLA, conjunctivae clear  Neck: supple; thyroid not palpable  Back: no tenderness  Respiratory: respiratory effort normal; b/l rhonchi  Cardiovascular: S1S2 regular, no murmurs  Abdomen: soft, not tender, not distended, positive BS  Genitourinary: no suprapubic tenderness  Lymphatic: no LN palpable  Musculoskeletal: no muscle tenderness, no joint swelling or tenderness  Extremities: no pedal edema  Right arm area of erythema with phlebitis  Neurological/ Psychiatric: confused; moving all extremities  Skin: no rashes; no palpable lesions    Labs: reviewed                        10.5   4.31  )-----------( 153      ( 19 May 2020 07:02 )             31.9     05-19    140  |  106  |  14  ----------------------------<  132<H>  3.5   |  27  |  0.58    Ca    8.1<L>      19 May 2020 07:02  Phos  2.7     05-19  Mg     1.5     05-19                        12.5   8.31  )-----------( 197      ( 07 May 2020 08:15 )             37.6     05-07    137  |  100  |  33<H>  ----------------------------<  114<H>  4.9   |  31  |  0.83    Ca    8.5      07 May 2020 08:15  Phos  4.2     05-07  Mg     2.4     05-07      COVID-19 PCR . (04.26.20 @ 00:02)    COVID-19 PCR: NotDetec      Culture - Blood (collected 26 Apr 2020 01:01)  Source: .Blood None  Preliminary Report (27 Apr 2020 10:02):    No growth to date.    Culture - Blood (collected 26 Apr 2020 00:56)  Source: .Blood None  Preliminary Report (27 Apr 2020 10:02):    No growth to date.    Culture - Sputum (collected 25 Apr 2020 01:00)  Source: .Sputum Sputum  trap  Gram Stain (26 Apr 2020 12:29):    Moderate polymorphonuclear leukocytes per low power field    No Squamous epithelial cells per low power field    Rare Gram Positive Rods per oil power field    Rare Gram Positive Cocci in Pairs and Chains per oil power field  Final Report (28 Apr 2020 10:53):    Few Pseudomonas aeruginosa (Carbapenem Resistant)    Moderate Methicillin resistant Staphylococcus aureus    Normal Respiratory Faith present  Organism: Pseudomonas aeruginosa (Carbapenem Resistant)  Methicillin resistant Staphylococcus aureus (28 Apr 2020 10:44)  Organism: Pseudomonas aeruginosa (Carbapenem Resistant) (28 Apr 2020 10:44)      -  Amikacin: S <=16      -  Aztreonam: R >16      -  Cefepime: R >16      -  Ceftazidime: R >16      -  Ciprofloxacin: S <=0.25      -  Gentamicin: S 4      -  Imipenem: R >8      -  Levofloxacin: S <=0.5      -  Meropenem: R >8      -  Piperacillin/Tazobactam: R >64      -  Tobramycin: S <=2      Method Type: LADY  Organism: Methicillin resistant Staphylococcus aureus (28 Apr 2020 10:40)      -  Amoxicillin/Clavulanic Acid: R >4/2      -  Ampicillin: R >8      -  Ampicillin/Sulbactam: R <=8/4      -  Cefazolin: R >16      -  Ceftriaxone: R >32      -  Ciprofloxacin: R >2      -  Clindamycin: R >4      -  Daptomycin: S 0.5      -  Erythromycin: R >4      -  Gentamicin: S <=1 Should not be used as monotherapy      -  Levofloxacin: R >4      -  Linezolid: S 4      -  Meropenem: R >8      -  Moxifloxacin(Aerobic): R >4      -  Oxacillin: R >2      -  Penicillin: R >8      -  RIF- Rifampin: S <=1 Should not be used as monotherapy      -  Tetra/Doxy: S 2      -  Trimethoprim/Sulfamethoxazole: S <=0.5/9.5      -  Vancomycin: S 2      Method Type: LADY    Culture - Urine (collected 21 Apr 2020 15:51)  Source: .Urine Clean Catch (Midstream)  Final Report (22 Apr 2020 17:03):    <10,000 CFU/mL Normal Urogenital Faith    Culture - Blood (collected 18 Apr 2020 19:43)  Source: .Blood Blood-Peripheral  Final Report (24 Apr 2020 01:01):    No Growth Final    Culture - Sputum . (04.29.20 @ 15:00)    -  Trimethoprim/Sulfamethoxazole: S <=0.5/9.5    -  Vancomycin: S 2    -  Tetra/Doxy: S 2    -  Gentamicin: S <=1 Should not be used as monotherapy    -  Erythromycin: R >4    -  Clindamycin: R >4    -  Cefazolin: R >16    -  Linezolid: S 4    -  Oxacillin: R >2    -  Penicillin: R >8    -  RIF- Rifampin: S <=1 Should not be used as monotherapy    -  Ampicillin/Sulbactam: R <=8/4    Gram Stain:   Numerous polymorphonuclear leukocytes per low power field  No Squamous epithelial cells per low power field  Numerous Gram Positive Cocci in Clusters per oil power field  Few Gram Positive Rods per oil power field  Few Gram Negative Rods per oil power field    Specimen Source: .Sputum Sputum    Culture Results:   Numerous Methicillin resistant Staphylococcus aureus  Normal Respiratory Faith present    Organism Identification: Methicillin resistant Staphylococcus aureus    Organism: Methicillin resistant Staphylococcus aureus    Method Type: LADY    Culture - Blood (05.18.20 @ 11:53)    Gram Stain:   Growth in aerobic bottle: Gram Positive Cocci in Clusters  Growth in anaerobic bottle: Gram Positive Cocci in Clusters    Specimen Source: .Blood Blood    Culture Results:   Growth in aerobic bottle: Gram Positive Cocci in Clusters  Growth in anaerobic bottle: Gram Positive Cocci in Clusters    COVID-19 PCR . (05.14.20 @ 08:10)    COVID-19 PCR: NotDetec    Radiology: all available radiological tests reviewed    < from: Xray Chest 1 View- PORTABLE-Urgent (05.18.20 @ 09:49) >  A tracheostomy tube is noted.  Lungs: There are no lung consolidations.  Heart: There is cardiomegaly.  Mediastinum: The mediastinum is within normal limits.    < end of copied text >      Advanced directives addressed: full resuscitation

## 2020-05-19 NOTE — PROGRESS NOTE ADULT - SUBJECTIVE AND OBJECTIVE BOX
Subjective: Patient remains sedated on vent. Tmax 102 in last 24H. Now with staph bactermia. Changed from aztreonam to vanco. Vented on 40% FiO2 and PEEP 5. TF running at 45cc/hr. Last BM 5/15. 5/18 CXR clear.     Vital Signs:  Vital Signs Last 24 Hrs  T(C): 36.6 (05-19-20 @ 10:18), Max: 38.9 (05-18-20 @ 13:00)  T(F): 97.9 (05-19-20 @ 10:18), Max: 102 (05-18-20 @ 13:00)  HR: 77 (05-19-20 @ 12:00) (68 - 113)  BP: 120/59 (05-19-20 @ 12:00) (66/10 - 120/59)  RR: 16 (05-19-20 @ 12:00) (16 - 31)  SpO2: 100% (05-19-20 @ 12:00) (88% - 100%) on (O2)      General: NAD  Neurology: Awake  Eyes: Scleras clear  Neck: Neck supple, trachea midline, No JVD, trach in place without erythema or drainage  Respiratory: B/L BS  CV: S1S2  Abdominal: Soft, distended, PEG in place with no drainage or erythema    Relevant labs, radiology and Medications reviewed                        10.5   4.31  )-----------( 153      ( 19 May 2020 07:02 )             31.9     05-19    140  |  106  |  14  ----------------------------<  132<H>  3.5   |  27  |  0.58    Ca    8.1<L>      19 May 2020 07:02  Phos  2.7     05-19  Mg     1.5     05-19        MEDICATIONS  (STANDING):  ALBUTerol    90 MICROgram(s) HFA Inhaler 2 Puff(s) Inhalation every 4 hours  budesonide 160 MICROgram(s)/formoterol 4.5 MICROgram(s) Inhaler 2 Puff(s) Inhalation two times a day  chlorhexidine 0.12% Liquid 15 milliLiter(s) Oral Mucosa every 12 hours  dexMEDEtomidine Infusion 0.7 MICROgram(s)/kG/Hr (20.6 mL/Hr) IV Continuous <Continuous>  diltiazem    Tablet 30 milliGRAM(s) Oral every 6 hours  enoxaparin Injectable 40 milliGRAM(s) SubCutaneous daily  gabapentin 400 milliGRAM(s) Oral three times a day  lactated ringers. 1000 milliLiter(s) (1000 mL/Hr) IV Continuous <Continuous>  lactated ringers. 1000 milliLiter(s) (100 mL/Hr) IV Continuous <Continuous>  pantoprazole  Injectable 40 milliGRAM(s) IV Push daily  polyethylene glycol 3350 17 Gram(s) Oral daily  senna 2 Tablet(s) Oral at bedtime  thiamine 100 milliGRAM(s) Oral daily  tiotropium 18 MICROgram(s) Capsule 1 Capsule(s) Inhalation daily  vancomycin  IVPB 1000 milliGRAM(s) IV Intermittent every 12 hours    MEDICATIONS  (PRN):  acetaminophen   Tablet .. 650 milliGRAM(s) Oral every 6 hours PRN Temp greater or equal to 38.5C (101.3F)  ALPRAZolam 1 milliGRAM(s) Oral every 6 hours PRN anxiety  metoclopramide Injectable 10 milliGRAM(s) IV Push every 8 hours PRN Vomiting        Assessment  65y Male  w/ PAST MEDICAL & SURGICAL HISTORY:  Chronic CHF  COPD without exacerbation  Atrial fibrillation  Presence of Watchman left atrial appendage closure device  Hypertension  GERD (gastroesophageal reflux disease)  Chronic obstructive pulmonary disease (COPD)  Anemia  Falls  Meningitis  Collapsed lung  Alcohol withdrawal  Emphysema of lung  Cirrhosis  CHF (congestive heart failure)  Poor historian  Alcohol abuse  Chronic atrial fibrillation  Unilateral amputation of lower extremity below knee  Presence of Watchman left atrial appendage closure device  S/P BKA (below knee amputation) unilateral, left  admitted with complaints of Patient is a 65y old  Male who presents with a chief complaint of acute hypoxemic, hypercapneic resp failure  COPD exacerbation (19 May 2020 11:02)   patient underwent Bronchoscopy with creation of tracheostomy and insertion of percutaneous endoscopic gastrostomy (PEG) tube  Insertion, nasogastric tube

## 2020-05-19 NOTE — PROGRESS NOTE ADULT - ASSESSMENT
NEURO: Precedex for sedation/anxiolytic therapy.  Xanax prn.  Gabapentin for neuropathic pain.   CV: afib on cardizem,   RESP: Hypoxic respiratory failure s/p trach, actively titrating fio2 and peep for spo2 >90%, weaning as tolerated.    RENAL: Monitor lytes, replace as needed  GI: NPO with tf   ENDO: No active issues.   ID: Azactam for pneumonia, Vancomycin x1 for staph aureus bacteremia, ID consult placed.    HEME: Lovenox for VTE ppx  DISPO: Full code.

## 2020-05-19 NOTE — PROGRESS NOTE ADULT - SUBJECTIVE AND OBJECTIVE BOX
HPI: Patient seen at a  this morning for AMS and hypoxic  Patient had been admitted with a COPD exacerbation.  It was believed that he went into ETOH withdrawal and had a total of 6MG Ativan over night.  He required intubation .    : he remains intubated.  Will try to wean this afternoon.      : He weaned this morning and failed with a decreased Tv.  But close to extubation.    : Patient weaned well this morning and was extubated.      : Chart reviewed, Patient was reintubated, failed weaning this morning, CXR with improving infiltrates  5/3: Patient remains on the vent failed weaning x3 today with increased RR and low TV, Off Levophed now  : Failed weaning yesterday, had to change his ET tube yesterday because he chewed through it,     : Pt on Precedex and Diprivan for sedation, Patient failed weaning again today  : Patient failed weaning today with a low TV and High RR  : He again failed weaning, NGT replaced, Fi O2 back to 40%, WBC normal,     : For weaning today, he has failed weaning daily  : Weaning this morning off levo  5/10: He Failed weaning this morning    : Patient failed weaning again this morning.  For Trach and PEG in 5/15    5/17: S/P Trach and PEG.  Weaned for several hours yesterday  : Weaned for under an hour yesterday but desated.  Temps to 104 x 2 over night.    : No Weaning yesterday because of the ongoing temps.  Now with MRSA in the Blood.              PAST MEDICAL HX  Alcohol abuse    Alcohol withdrawal    Anemia    AFIB atrial fibrillation    CHF (congestive heart failure)    Chronic atrial fibrillation    Chronic CHF    Chronic obstructive pulmonary disease (COPD)    Cirrhosis    Collapsed lung    COPD without exacerbation    Emphysema of lung    Falls    GERD (gastroesophageal reflux disease)    HTN hypertension    Meningitis    Poor historian    Presence of Watchman left atrial appendage closure device.    PAST SURGICAL HX  Presence of Watchman left atrial appendage closure device    S/P BKA (below knee amputation) unilateral, left    Unilateral amputation of lower extremity below knee.      FAMILY HX  Family history unknown: Adopted      SOCIAL HX  lives alone  former smoker  alcohol use last 2-3 days ago (2020 03:19)       PAST MEDICAL & SURGICAL HISTORY:  Chronic CHF  COPD without exacerbation  Atrial fibrillation  Presence of Watchman left atrial appendage closure device  Hypertension  GERD (gastroesophageal reflux disease)  Chronic obstructive pulmonary disease (COPD)  Anemia  Falls  Meningitis  Collapsed lung  Alcohol withdrawal  Emphysema of lung  Cirrhosis  CHF (congestive heart failure)  Poor historian  Alcohol abuse  Chronic atrial fibrillation  Unilateral amputation of lower extremity below knee  Presence of Watchman left atrial appendage closure device  S/P BKA (below knee amputation) unilateral, left      FAMILY HISTORY:  No pertinent family history in first degree relatives  Family history unknown: Adopted      Social Hx:    Allergies    Ceclor (Rash)  Duricef (Rash)    Intolerances            ICU Vital Signs Last 24 Hrs  T(C): 36.6 (19 May 2020 10:18), Max: 38.9 (18 May 2020 13:00)  T(F): 97.9 (19 May 2020 10:18), Max: 102 (18 May 2020 13:00)  HR: 78 (19 May 2020 09:26) (68 - 115)  BP: 96/50 (19 May 2020 08:00) (45/23 - 117/51)  BP(mean): 60 (19 May 2020 08:00) (25 - 68)  ABP: --  ABP(mean): --  RR: 20 (19 May 2020 08:00) (16 - 31)  SpO2: 99% (19 May 2020 09:26) (88% - 100%)      Mode: AC/ CMV (Assist Control/ Continuous Mandatory Ventilation)  RR (machine): 15  TV (machine): 450  FiO2: 40  PEEP: 5  ITime: 1  MAP: 12  PIP: 30      I&O's Summary    18 May 2020 07:01  -  19 May 2020 07:00  --------------------------------------------------------  IN: 5448 mL / OUT: 400 mL / NET: 5048 mL                              10.5   4.31  )-----------( 153      ( 19 May 2020 07:02 )             31.9       05-19    140  |  106  |  14  ----------------------------<  132<H>  3.5   |  27  |  0.58    Ca    8.1<L>      19 May 2020 07:02  Phos  2.7     05-19  Mg     1.5     05-19                  Urinalysis Basic - ( 18 May 2020 09:45 )    Color: Brown / Appearance: very cloudy / S.010 / pH: x  Gluc: x / Ketone: Moderate  / Bili: Moderate / Urobili: 12 mg/dL   Blood: x / Protein: 30 mg/dL / Nitrite: Positive   Leuk Esterase: Small / RBC: Negative /HPF / WBC 0-2   Sq Epi: x / Non Sq Epi: x / Bacteria: Moderate        MEDICATIONS  (STANDING):  ALBUTerol    90 MICROgram(s) HFA Inhaler 2 Puff(s) Inhalation every 4 hours  aztreonam  IVPB 2000 milliGRAM(s) IV Intermittent every 8 hours  aztreonam  IVPB      budesonide 160 MICROgram(s)/formoterol 4.5 MICROgram(s) Inhaler 2 Puff(s) Inhalation two times a day  chlorhexidine 0.12% Liquid 15 milliLiter(s) Oral Mucosa every 12 hours  dexMEDEtomidine Infusion 0.7 MICROgram(s)/kG/Hr (20.6 mL/Hr) IV Continuous <Continuous>  diltiazem    Tablet 30 milliGRAM(s) Oral every 6 hours  enoxaparin Injectable 40 milliGRAM(s) SubCutaneous daily  gabapentin 400 milliGRAM(s) Oral three times a day  lactated ringers. 1000 milliLiter(s) (1000 mL/Hr) IV Continuous <Continuous>  lactated ringers. 1000 milliLiter(s) (100 mL/Hr) IV Continuous <Continuous>  pantoprazole  Injectable 40 milliGRAM(s) IV Push daily  polyethylene glycol 3350 17 Gram(s) Oral daily  senna 2 Tablet(s) Oral at bedtime  thiamine 100 milliGRAM(s) Oral daily  tiotropium 18 MICROgram(s) Capsule 1 Capsule(s) Inhalation daily    MEDICATIONS  (PRN):  acetaminophen   Tablet .. 650 milliGRAM(s) Oral every 6 hours PRN Temp greater or equal to 38.5C (101.3F)  ALPRAZolam 1 milliGRAM(s) Oral every 6 hours PRN anxiety  metoclopramide Injectable 10 milliGRAM(s) IV Push every 8 hours PRN Vomiting      DVT Prophylaxis:    Advanced Directives:  Discussed with:    Visit Information: 30    ** Time is exclusive of billed procedures and/or teaching and/or routine family updates.

## 2020-05-19 NOTE — PROGRESS NOTE ADULT - ASSESSMENT
PROBLEMS;    New fever-staph bactermia  Ac on chronic hypercapnic & hypoxamic respiratory failure-s/p trach  sputum-Few Pseudomonas aeruginosa (Carbapenem Resistant)/Moderate Methicillin resistant Staphylococcus aureus  afib with RVR  OHS/VIJAY  AC flare of COPD/chronic vs acute on chronic bronchitis  Mild interstitial lung disease-NSIP h/o smoking  COVID negativex2  Pulmonary hypertension  Nonobstructing stone in the left ureterovesicular junction/ nonobstructing stone in the lower pole right kidney.  Subacute hemorrhage in the right rectus abdominis along the anterior sheath, measures 1.6 cm in thickness and extends from the umbilicus to the inferior myotendinous junction  Cirrhosis  Mild splenomegaly-portal venous hypertension.  Chronic afib w Watchman device  CHF  BPH  GERD  ETOH abuse  seizures disorder    PLAN;    vent support-pat multiple failed weaning attempts- s/p trach weaning as tolerated  iv vanco for bactermia  iv percedex  aerosols  supportive care  covid repeat testing-neg  d/w staff

## 2020-05-19 NOTE — PROGRESS NOTE ADULT - ASSESSMENT
66 y/o male with h/o chronic A.Fib with Watchman device, CHF, COPD, HTN, obesity was admitted on 4/18 for worsening SOB. In ER he was found with rapid A.fib and was placed on NRB. He tested COVID-19 PCR negative x 3. Noted with hypercapnea and placed on BiPAP, then intubated and placed on ventilatory support. He was extubated on 4/26. He is reported with MDRO's in sputum c/s. He was treated with azithromycin from 4/20 to 4/25.     1. Febrile syndrome. Sepsis with GPC in clusters. Acute respiratory failure. Prior pneumonia with MRSA and CRE-PSAE. Allergy to PCN and cephalosporines.  -has recurrent fever  -concerned about sepsis with MRSA ?source  -encephalopathy   -cultures reviewed; repeat sputum c/s shows MRSA and normal respiratory raymundo  -s/p cipro IV # 9  -s/p vancomycin 1 gm IV q12h # 10 days until 5/7  -respiratory care  -repeat BC x 2 noted  -start vancomycin  -weaning trial  -monitor temps  -f/u CBC  -supportive care  2. Other issues:   -care per medicine 64 y/o male with h/o chronic A.Fib with Watchman device, CHF, COPD, HTN, obesity was admitted on 4/18 for worsening SOB. In ER he was found with rapid A.fib and was placed on NRB. He tested COVID-19 PCR negative x 3. Noted with hypercapnea and placed on BiPAP, then intubated and placed on ventilatory support. He was extubated on 4/26. He is reported with MDRO's in sputum c/s. He was treated with azithromycin from 4/20 to 4/25.     1. Febrile syndrome. Sepsis with GPC in clusters. Right arm cellulitis/ phlebitis. Acute respiratory failure. Prior pneumonia with MRSA and CRE-PSAE. Allergy to PCN and cephalosporines. Possible COVID-19 syndrome.   -has recurrent fever  -concerned about sepsis with MRSA ?source  -encephalopathy   -cultures reviewed; repeat sputum c/s shows MRSA and normal respiratory raymundo  -s/p cipro IV # 9  -s/p vancomycin 1 gm IV q12h # 10 days until 5/7  -respiratory care  -repeat BC x 2 noted  -start vancomycin 1 gm IV q12h  -reason for abx use and side effects reviewed; monitor BMP and vancomycin trough levels   -monitor temps  -f/u CBC  -supportive care  2. Other issues:   -care per medicine DISPLAY PLAN FREE TEXT

## 2020-05-19 NOTE — PROVIDER CONTACT NOTE (CRITICAL VALUE NOTIFICATION) - TEST AND RESULT REPORTED:
blood cultures collected on 5/18, prelim results growth in aerobic bottle gram positive cocci in clusters

## 2020-05-19 NOTE — PROGRESS NOTE ADULT - SUBJECTIVE AND OBJECTIVE BOX
Subjective:    pat on vent, temp 101, b/c positive staph.    Home Medications:  albuterol 2.5 mg/3 mL (0.083%) inhalation solution: 3 milliliter(s) inhaled every 6 hours, As Needed -for shortness of breath and/or wheezing (2020 03:12)  gabapentin 400 mg oral capsule: 1 cap(s) orally 3 times a day (2020 03:12)  pantoprazole 40 mg oral granule, enteric coated: 40 milligram(s) orally once a day (2020 03:12)  Spiriva 18 mcg inhalation capsule: 1 cap(s) inhaled once a day (2020 03:12)  tamsulosin 0.4 mg oral capsule: 1 cap(s) orally once a day (at bedtime) (2020 03:12)  traZODone 50 mg oral tablet: 2 tab(s) orally once a day (at bedtime), As Needed (2020 03:12)    MEDICATIONS  (STANDING):  ALBUTerol    90 MICROgram(s) HFA Inhaler 2 Puff(s) Inhalation every 4 hours  budesonide 160 MICROgram(s)/formoterol 4.5 MICROgram(s) Inhaler 2 Puff(s) Inhalation two times a day  chlorhexidine 0.12% Liquid 15 milliLiter(s) Oral Mucosa every 12 hours  dexMEDEtomidine Infusion 0.7 MICROgram(s)/kG/Hr (20.6 mL/Hr) IV Continuous <Continuous>  diltiazem    Tablet 30 milliGRAM(s) Oral every 6 hours  enoxaparin Injectable 40 milliGRAM(s) SubCutaneous daily  gabapentin 400 milliGRAM(s) Oral three times a day  lactated ringers. 1000 milliLiter(s) (1000 mL/Hr) IV Continuous <Continuous>  lactated ringers. 1000 milliLiter(s) (100 mL/Hr) IV Continuous <Continuous>  pantoprazole  Injectable 40 milliGRAM(s) IV Push daily  polyethylene glycol 3350 17 Gram(s) Oral daily  senna 2 Tablet(s) Oral at bedtime  thiamine 100 milliGRAM(s) Oral daily  tiotropium 18 MICROgram(s) Capsule 1 Capsule(s) Inhalation daily  vancomycin  IVPB 1000 milliGRAM(s) IV Intermittent every 12 hours    MEDICATIONS  (PRN):  acetaminophen   Tablet .. 650 milliGRAM(s) Oral every 6 hours PRN Temp greater or equal to 38.5C (101.3F)  ALPRAZolam 1 milliGRAM(s) Oral every 6 hours PRN anxiety  metoclopramide Injectable 10 milliGRAM(s) IV Push every 8 hours PRN Vomiting      Allergies    Ceclor (Rash)  Duricef (Rash)    Intolerances        Vital Signs Last 24 Hrs  T(C): 36.6 (19 May 2020 10:18), Max: 38.8 (18 May 2020 14:00)  T(F): 97.9 (19 May 2020 10:18), Max: 101.9 (18 May 2020 14:00)  HR: 77 (19 May 2020 12:00) (68 - 113)  BP: 120/59 (19 May 2020 12:00) (66/10 - 120/59)  BP(mean): 73 (19 May 2020 12:00) (25 - 73)  RR: 16 (19 May 2020 12:00) (16 - 31)  SpO2: 100% (19 May 2020 12:00) (88% - 100%)  Mode: AC/ CMV (Assist Control/ Continuous Mandatory Ventilation)  RR (machine): 15  TV (machine): 450  FiO2: 40  PEEP: 5  ITime: 1  MAP: 12  PIP: 30      PHYSICAL EXAMINATION:    NECK:  Supple. No lymphadenopathy. Jugular venous pressure not elevated. Carotids equal.   HEART:   The cardiac impulse has a normal quality. Reg., Nl S1 and S2.  There are no murmurs, rubs or gallops noted  CHEST:  Chest rhonchi to auscultation. Normal respiratory effort.  ABDOMEN:  Soft and nontender.   EXTREMITIES:  There is no edema.       LABS:                        10.5   4.31  )-----------( 153      ( 19 May 2020 07:02 )             31.9   Culture - Blood (20 @ 11:44)    -  Staphylococcus aureus: Detec Any isolate of Staphylococcus aureus from a blood culture is NOT considered a contaminant.    Gram Stain:   Growth in aerobic bottle: Gram Positive Cocci in Clusters  Growth in anaerobic bottle: Gram Positive Cocci in Clusters    Specimen Source: .Blood None    Organism: Blood Culture PCR    Culture Results:   Growth in aerobic bottle: Gram Positive Cocci in Clusters  Growth in anaerobic bottle: Gram Positive Cocci in Clusters  ***Blood Panel PCR results on this specimen are available  approximately 3 hours after the Gram stain result.***  Gram stain, PCR, and/or culture results may not always  correspond due to difference in methodologies.  ************************************************************  This PCR assay was performed using [employer].  The following targets are tested for: Enterococcus,  vancomycin resistant enterococci, Listeria monocytogenes,  coagulase negative staphylococci, S. aureus,  methicillin resistant S. aureus, Streptococcus agalactiae  (Group B), S. pneumoniae, S. pyogenes (Group A),  Acinetobacter baumannii, Enterobacter cloacae, E. coli,  Klebsiella oxytoca, K. pneumoniae, Proteus sp.,  Serratia marcescens, Haemophilus influenzae,  Neisseria meningitidis, Pseudomonas aeruginosa, Candida  albicans, C. glabrata, C krusei, C parapsilosis,  C. tropicalis and the KPC resistance gene.  "Due to technical problems, Proteus sp. will Not be reported as part of  the BCID panel until further notice"    Organism Identification: Blood Culture PCR    Method Type: PCR          140  |  106  |  14  ----------------------------<  132<H>  3.5   |  27  |  0.58    Ca    8.1<L>      19 May 2020 07:02  Phos  2.7       Mg     1.5             Urinalysis Basic - ( 18 May 2020 09:45 )    Color: Brown / Appearance: very cloudy / S.010 / pH: x  Gluc: x / Ketone: Moderate  / Bili: Moderate / Urobili: 12 mg/dL   Blood: x / Protein: 30 mg/dL / Nitrite: Positive   Leuk Esterase: Small / RBC: Negative /HPF / WBC 0-2   Sq Epi: x / Non Sq Epi: x / Bacteria: Moderate    Culture - Blood (20 @ 11:53)    Gram Stain:   Growth in aerobic bottle: Gram Positive Cocci in Clusters  Growth in anaerobic bottle: Gram Positive Cocci in Clusters    Specimen Source: .Blood Blood    Culture Results:   Growth in aerobic bottle: Gram Positive Cocci in Clusters  Growth in anaerobic bottle: Gram Positive Cocci in Clusters

## 2020-05-20 LAB
-  AMIKACIN: SIGNIFICANT CHANGE UP
-  AMPICILLIN/SULBACTAM: SIGNIFICANT CHANGE UP
-  AZTREONAM: SIGNIFICANT CHANGE UP
-  CEFAZOLIN: SIGNIFICANT CHANGE UP
-  CEFEPIME: SIGNIFICANT CHANGE UP
-  CEFTAZIDIME: SIGNIFICANT CHANGE UP
-  CIPROFLOXACIN: SIGNIFICANT CHANGE UP
-  CLINDAMYCIN: SIGNIFICANT CHANGE UP
-  ERYTHROMYCIN: SIGNIFICANT CHANGE UP
-  GENTAMICIN: SIGNIFICANT CHANGE UP
-  GENTAMICIN: SIGNIFICANT CHANGE UP
-  IMIPENEM: SIGNIFICANT CHANGE UP
-  LEVOFLOXACIN: SIGNIFICANT CHANGE UP
-  MEROPENEM: SIGNIFICANT CHANGE UP
-  OXACILLIN: SIGNIFICANT CHANGE UP
-  PENICILLIN: SIGNIFICANT CHANGE UP
-  PIPERACILLIN/TAZOBACTAM: SIGNIFICANT CHANGE UP
-  RIFAMPIN: SIGNIFICANT CHANGE UP
-  TETRACYCLINE: SIGNIFICANT CHANGE UP
-  TOBRAMYCIN: SIGNIFICANT CHANGE UP
-  TRIMETHOPRIM/SULFAMETHOXAZOLE: SIGNIFICANT CHANGE UP
-  VANCOMYCIN: SIGNIFICANT CHANGE UP
ANION GAP SERPL CALC-SCNC: 8 MMOL/L — SIGNIFICANT CHANGE UP (ref 5–17)
BUN SERPL-MCNC: 13 MG/DL — SIGNIFICANT CHANGE UP (ref 7–23)
CALCIUM SERPL-MCNC: 7.8 MG/DL — LOW (ref 8.5–10.1)
CHLORIDE SERPL-SCNC: 103 MMOL/L — SIGNIFICANT CHANGE UP (ref 96–108)
CO2 SERPL-SCNC: 25 MMOL/L — SIGNIFICANT CHANGE UP (ref 22–31)
CREAT SERPL-MCNC: 0.44 MG/DL — LOW (ref 0.5–1.3)
CULTURE RESULTS: SIGNIFICANT CHANGE UP
GLUCOSE SERPL-MCNC: 159 MG/DL — HIGH (ref 70–99)
HCT VFR BLD CALC: 28.1 % — LOW (ref 39–50)
HGB BLD-MCNC: 9 G/DL — LOW (ref 13–17)
MAGNESIUM SERPL-MCNC: 1.5 MG/DL — LOW (ref 1.6–2.6)
MCHC RBC-ENTMCNC: 30.7 PG — SIGNIFICANT CHANGE UP (ref 27–34)
MCHC RBC-ENTMCNC: 32 GM/DL — SIGNIFICANT CHANGE UP (ref 32–36)
MCV RBC AUTO: 95.9 FL — SIGNIFICANT CHANGE UP (ref 80–100)
METHOD TYPE: SIGNIFICANT CHANGE UP
METHOD TYPE: SIGNIFICANT CHANGE UP
ORGANISM # SPEC MICROSCOPIC CNT: SIGNIFICANT CHANGE UP
PHOSPHATE SERPL-MCNC: 1.9 MG/DL — LOW (ref 2.5–4.5)
PLATELET # BLD AUTO: 145 K/UL — LOW (ref 150–400)
POTASSIUM SERPL-MCNC: 3.3 MMOL/L — LOW (ref 3.5–5.3)
POTASSIUM SERPL-SCNC: 3.3 MMOL/L — LOW (ref 3.5–5.3)
RBC # BLD: 2.93 M/UL — LOW (ref 4.2–5.8)
RBC # FLD: 13 % — SIGNIFICANT CHANGE UP (ref 10.3–14.5)
SODIUM SERPL-SCNC: 136 MMOL/L — SIGNIFICANT CHANGE UP (ref 135–145)
SPECIMEN SOURCE: SIGNIFICANT CHANGE UP
WBC # BLD: 3.5 K/UL — LOW (ref 3.8–10.5)
WBC # FLD AUTO: 3.5 K/UL — LOW (ref 3.8–10.5)

## 2020-05-20 PROCEDURE — 99232 SBSQ HOSP IP/OBS MODERATE 35: CPT

## 2020-05-20 PROCEDURE — 99291 CRITICAL CARE FIRST HOUR: CPT

## 2020-05-20 RX ORDER — MIDODRINE HYDROCHLORIDE 2.5 MG/1
10 TABLET ORAL EVERY 8 HOURS
Refills: 0 | Status: DISCONTINUED | OUTPATIENT
Start: 2020-05-20 | End: 2020-05-22

## 2020-05-20 RX ORDER — MEROPENEM 1 G/30ML
1000 INJECTION INTRAVENOUS ONCE
Refills: 0 | Status: COMPLETED | OUTPATIENT
Start: 2020-05-20 | End: 2020-05-20

## 2020-05-20 RX ORDER — SODIUM,POTASSIUM PHOSPHATES 278-250MG
2 POWDER IN PACKET (EA) ORAL
Refills: 0 | Status: COMPLETED | OUTPATIENT
Start: 2020-05-20 | End: 2020-05-22

## 2020-05-20 RX ORDER — METOPROLOL TARTRATE 50 MG
5 TABLET ORAL ONCE
Refills: 0 | Status: COMPLETED | OUTPATIENT
Start: 2020-05-20 | End: 2020-05-20

## 2020-05-20 RX ORDER — DILTIAZEM HCL 120 MG
60 CAPSULE, EXT RELEASE 24 HR ORAL EVERY 6 HOURS
Refills: 0 | Status: DISCONTINUED | OUTPATIENT
Start: 2020-05-20 | End: 2020-05-24

## 2020-05-20 RX ORDER — METOPROLOL TARTRATE 50 MG
5 TABLET ORAL ONCE
Refills: 0 | Status: DISCONTINUED | OUTPATIENT
Start: 2020-05-20 | End: 2020-05-20

## 2020-05-20 RX ORDER — POTASSIUM CHLORIDE 20 MEQ
40 PACKET (EA) ORAL ONCE
Refills: 0 | Status: COMPLETED | OUTPATIENT
Start: 2020-05-20 | End: 2020-05-20

## 2020-05-20 RX ORDER — MAGNESIUM SULFATE 500 MG/ML
2 VIAL (ML) INJECTION ONCE
Refills: 0 | Status: COMPLETED | OUTPATIENT
Start: 2020-05-20 | End: 2020-05-20

## 2020-05-20 RX ORDER — POTASSIUM PHOSPHATE, MONOBASIC POTASSIUM PHOSPHATE, DIBASIC 236; 224 MG/ML; MG/ML
15 INJECTION, SOLUTION INTRAVENOUS ONCE
Refills: 0 | Status: COMPLETED | OUTPATIENT
Start: 2020-05-20 | End: 2020-05-20

## 2020-05-20 RX ADMIN — MIDODRINE HYDROCHLORIDE 10 MILLIGRAM(S): 2.5 TABLET ORAL at 14:58

## 2020-05-20 RX ADMIN — MEROPENEM 100 MILLIGRAM(S): 1 INJECTION INTRAVENOUS at 08:43

## 2020-05-20 RX ADMIN — Medication 100 MILLIGRAM(S): at 14:59

## 2020-05-20 RX ADMIN — CHLORHEXIDINE GLUCONATE 15 MILLILITER(S): 213 SOLUTION TOPICAL at 14:59

## 2020-05-20 RX ADMIN — ALBUTEROL 2 PUFF(S): 90 AEROSOL, METERED ORAL at 14:58

## 2020-05-20 RX ADMIN — Medication 30 MILLIGRAM(S): at 06:49

## 2020-05-20 RX ADMIN — Medication 250 MILLIGRAM(S): at 16:38

## 2020-05-20 RX ADMIN — Medication 1 MILLIGRAM(S): at 12:42

## 2020-05-20 RX ADMIN — Medication 2 PACKET(S): at 16:38

## 2020-05-20 RX ADMIN — ALBUTEROL 2 PUFF(S): 90 AEROSOL, METERED ORAL at 20:30

## 2020-05-20 RX ADMIN — Medication 5 MILLIGRAM(S): at 20:30

## 2020-05-20 RX ADMIN — Medication 40 MILLIEQUIVALENT(S): at 12:55

## 2020-05-20 RX ADMIN — Medication 30 MILLIGRAM(S): at 14:50

## 2020-05-20 RX ADMIN — CHLORHEXIDINE GLUCONATE 15 MILLILITER(S): 213 SOLUTION TOPICAL at 06:49

## 2020-05-20 RX ADMIN — ALBUTEROL 2 PUFF(S): 90 AEROSOL, METERED ORAL at 03:32

## 2020-05-20 RX ADMIN — GABAPENTIN 400 MILLIGRAM(S): 400 CAPSULE ORAL at 23:57

## 2020-05-20 RX ADMIN — POLYETHYLENE GLYCOL 3350 17 GRAM(S): 17 POWDER, FOR SOLUTION ORAL at 12:55

## 2020-05-20 RX ADMIN — Medication 60 MILLIGRAM(S): at 23:58

## 2020-05-20 RX ADMIN — MIDODRINE HYDROCHLORIDE 10 MILLIGRAM(S): 2.5 TABLET ORAL at 23:57

## 2020-05-20 RX ADMIN — GABAPENTIN 400 MILLIGRAM(S): 400 CAPSULE ORAL at 06:49

## 2020-05-20 RX ADMIN — Medication 1 MILLIGRAM(S): at 23:58

## 2020-05-20 RX ADMIN — Medication 5 MILLIGRAM(S): at 16:38

## 2020-05-20 RX ADMIN — ENOXAPARIN SODIUM 40 MILLIGRAM(S): 100 INJECTION SUBCUTANEOUS at 12:55

## 2020-05-20 RX ADMIN — Medication 60 MILLIGRAM(S): at 16:38

## 2020-05-20 RX ADMIN — BUDESONIDE AND FORMOTEROL FUMARATE DIHYDRATE 2 PUFF(S): 160; 4.5 AEROSOL RESPIRATORY (INHALATION) at 20:30

## 2020-05-20 RX ADMIN — Medication 50 GRAM(S): at 16:37

## 2020-05-20 RX ADMIN — GABAPENTIN 400 MILLIGRAM(S): 400 CAPSULE ORAL at 14:58

## 2020-05-20 RX ADMIN — ALBUTEROL 2 PUFF(S): 90 AEROSOL, METERED ORAL at 00:16

## 2020-05-20 RX ADMIN — Medication 250 MILLIGRAM(S): at 06:00

## 2020-05-20 RX ADMIN — POTASSIUM PHOSPHATE, MONOBASIC POTASSIUM PHOSPHATE, DIBASIC 63.75 MILLIMOLE(S): 236; 224 INJECTION, SOLUTION INTRAVENOUS at 08:44

## 2020-05-20 RX ADMIN — ALBUTEROL 2 PUFF(S): 90 AEROSOL, METERED ORAL at 07:31

## 2020-05-20 RX ADMIN — ALBUTEROL 2 PUFF(S): 90 AEROSOL, METERED ORAL at 23:40

## 2020-05-20 RX ADMIN — PANTOPRAZOLE SODIUM 40 MILLIGRAM(S): 20 TABLET, DELAYED RELEASE ORAL at 14:58

## 2020-05-20 RX ADMIN — DEXMEDETOMIDINE HYDROCHLORIDE IN 0.9% SODIUM CHLORIDE 20.6 MICROGRAM(S)/KG/HR: 4 INJECTION INTRAVENOUS at 08:44

## 2020-05-20 RX ADMIN — BUDESONIDE AND FORMOTEROL FUMARATE DIHYDRATE 2 PUFF(S): 160; 4.5 AEROSOL RESPIRATORY (INHALATION) at 07:32

## 2020-05-20 RX ADMIN — ALBUTEROL 2 PUFF(S): 90 AEROSOL, METERED ORAL at 11:33

## 2020-05-20 NOTE — PROGRESS NOTE ADULT - SUBJECTIVE AND OBJECTIVE BOX
HPI: Patient seen at a  this morning for AMS and hypoxic  Patient had been admitted with a COPD exacerbation.  It was believed that he went into ETOH withdrawal and had a total of 6MG Ativan over night.  He required intubation 4/23.    4/24: he remains intubated.  Will try to wean this afternoon.      4/25: He weaned this morning and failed with a decreased Tv.  But close to extubation.    4/26: Patient weaned well this morning and was extubated.      5/2: Chart reviewed, Patient was reintubated, failed weaning this morning, CXR with improving infiltrates  5/3: Patient remains on the vent failed weaning x3 today with increased RR and low TV, Off Levophed now  5/4: Failed weaning yesterday, had to change his ET tube yesterday because he chewed through it,     5/5: Pt on Precedex and Diprivan for sedation, Patient failed weaning again today  5/6: Patient failed weaning today with a low TV and High RR  5/7: He again failed weaning, NGT replaced, Fi O2 back to 40%, WBC normal,     5/8: For weaning today, he has failed weaning daily  5/9: Weaning this morning off levo  5/10: He Failed weaning this morning    5/14: Patient failed weaning again this morning.  For Trach and PEG in 5/15    5/17: S/P Trach and PEG.  Weaned for several hours yesterday  5/18: Weaned for under an hour yesterday but desated.  Temps to 104 x 2 over night.    5/19: No Weaning yesterday because of the ongoing temps.  Now with MRSA in the Blood.    5/20: Temps down, now off precedex            PAST MEDICAL & SURGICAL HISTORY:  Chronic CHF  COPD without exacerbation  Atrial fibrillation  Presence of Watchman left atrial appendage closure device  Hypertension  GERD (gastroesophageal reflux disease)  Chronic obstructive pulmonary disease (COPD)  Anemia  Falls  Meningitis  Collapsed lung  Alcohol withdrawal  Emphysema of lung  Cirrhosis  CHF (congestive heart failure)  Poor historian  Alcohol abuse  Chronic atrial fibrillation  Unilateral amputation of lower extremity below knee  Presence of Watchman left atrial appendage closure device  S/P BKA (below knee amputation) unilateral, left      FAMILY HISTORY:  No pertinent family history in first degree relatives  Family history unknown: Adopted      Social Hx:    Allergies    Ceclor (Rash)  Duricef (Rash)    Intolerances            ICU Vital Signs Last 24 Hrs  T(C): 37 (20 May 2020 09:57), Max: 38.2 (20 May 2020 00:00)  T(F): 98.6 (20 May 2020 09:57), Max: 100.7 (20 May 2020 00:00)  HR: 104 (20 May 2020 11:31) (70 - 143)  BP: 132/64 (20 May 2020 10:00) (99/77 - 132/64)  BP(mean): 81 (20 May 2020 10:00) (65 - 97)  ABP: --  ABP(mean): --  RR: 23 (20 May 2020 10:00) (16 - 27)  SpO2: 98% (20 May 2020 11:31) (84% - 100%)      Mode: AC/ CMV (Assist Control/ Continuous Mandatory Ventilation)  RR (machine): 15  TV (machine): 450  FiO2: 40  PEEP: 5  ITime: 1  MAP: 18  PIP: 32      I&O's Summary    19 May 2020 07:01  -  20 May 2020 07:00  --------------------------------------------------------  IN: 5074 mL / OUT: 425 mL / NET: 4649 mL                              9.0    3.50  )-----------( 145      ( 20 May 2020 06:41 )             28.1       05-20    136  |  103  |  13  ----------------------------<  159<H>  3.3<L>   |  25  |  0.44<L>    Ca    7.8<L>      20 May 2020 06:41  Phos  1.9     05-20  Mg     1.5     05-20                      MEDICATIONS  (STANDING):  ALBUTerol    90 MICROgram(s) HFA Inhaler 2 Puff(s) Inhalation every 4 hours  budesonide 160 MICROgram(s)/formoterol 4.5 MICROgram(s) Inhaler 2 Puff(s) Inhalation two times a day  chlorhexidine 0.12% Liquid 15 milliLiter(s) Oral Mucosa every 12 hours  dexMEDEtomidine Infusion 0.7 MICROgram(s)/kG/Hr (20.6 mL/Hr) IV Continuous <Continuous>  diltiazem    Tablet 30 milliGRAM(s) Oral every 6 hours  enoxaparin Injectable 40 milliGRAM(s) SubCutaneous daily  gabapentin 400 milliGRAM(s) Oral three times a day  lactated ringers. 1000 milliLiter(s) (100 mL/Hr) IV Continuous <Continuous>  midodrine 10 milliGRAM(s) Oral every 8 hours  pantoprazole  Injectable 40 milliGRAM(s) IV Push daily  polyethylene glycol 3350 17 Gram(s) Oral daily  potassium chloride   Powder 40 milliEquivalent(s) Oral once  potassium phosphate / sodium phosphate powder 2 Packet(s) Oral two times a day  senna 2 Tablet(s) Oral at bedtime  thiamine 100 milliGRAM(s) Oral daily  tiotropium 18 MICROgram(s) Capsule 1 Capsule(s) Inhalation daily  vancomycin  IVPB 1000 milliGRAM(s) IV Intermittent every 12 hours    MEDICATIONS  (PRN):  acetaminophen   Tablet .. 650 milliGRAM(s) Oral every 6 hours PRN Temp greater or equal to 38.5C (101.3F)  ALPRAZolam 1 milliGRAM(s) Oral every 6 hours PRN anxiety  metoclopramide Injectable 10 milliGRAM(s) IV Push every 8 hours PRN Vomiting      DVT Prophylaxis:    Advanced Directives:  Discussed with:    Visit Information: 30 min    ** Time is exclusive of billed procedures and/or teaching and/or routine family updates.

## 2020-05-20 NOTE — PROGRESS NOTE ADULT - ASSESSMENT
64 y/o M with history of hypercapnic respiratory failure Trach/PEG in 2015 and 2017,  now intubated from hypercapnic respiratory failure , COVID neg. S/P Trach and PEG 5/15. Now with fevers, on Vanco. Staph bacteremia.     Trach and PEG care  Please do not put drain sponge under trach or PEG  may resume home RX ASA and plavix for Hx of CVA? when stable  C/W ABX as per ID        Thu Veloz PA  Thoracic [facility]  #[phone]

## 2020-05-20 NOTE — PROGRESS NOTE ADULT - ASSESSMENT
64 y/o male with h/o chronic A.Fib with Watchman device, CHF, COPD, HTN, obesity was admitted on 4/18 for worsening SOB. In ER he was found with rapid A.fib and was placed on NRB. He tested COVID-19 PCR negative x 3. Noted with hypercapnea and placed on BiPAP, then intubated and placed on ventilatory support. He was extubated on 4/26. He is reported with MDRO's in sputum c/s. He was treated with azithromycin from 4/20 to 4/25.     1. Febrile syndrome. Sepsis with STAU. Right arm cellulitis/ phlebitis. Acute respiratory failure. Probable trachitis with PSAE. Prior pneumonia with MRSA and CRE-PSAE. Allergy to PCN and cephalosporines. Possible COVID-19 syndrome.   -has recurrent fever  -concerned about sepsis with MRSA ?source  -encephalopathy   -cultures reviewed; repeat sputum c/s shows PSAE  -s/p cipro IV # 9  -s/p vancomycin 1 gm IV q12h # 10 days until 5/7  -respiratory care  -repeat BC x 2 noted  -on vancomycin 1 gm IV q12h # 2  -tolerating abx well so far; no side effects noted  -he had prior CRE-PSAE; new sputum culture shows PSAE again; CXR dose not show infiltrates  -given meropenem IV x 1  -f/u culture for further identification and sensitivity analysis  -obtain vancomycin trough levels   -continue IV vancomycin; will reconsider abx coverage when further culture results available  -monitor temps  -f/u CBC  -supportive care  2. Other issues:   -care per medicine

## 2020-05-20 NOTE — PROGRESS NOTE ADULT - ASSESSMENT
PROBLEMS;    New fever-staph bactermia/sputum pseudomonas/staph  Ac on chronic hypercapnic & hypoxamic respiratory failure-s/p trach  sputum-Few Pseudomonas aeruginosa (Carbapenem Resistant)/Moderate Methicillin resistant Staphylococcus aureus  afib with RVR  OHS/VIJAY  AC flare of COPD/chronic vs acute on chronic bronchitis  Mild interstitial lung disease-NSIP h/o smoking  COVID negativex2  Pulmonary hypertension  Nonobstructing stone in the left ureterovesicular junction/ nonobstructing stone in the lower pole right kidney.  Subacute hemorrhage in the right rectus abdominis along the anterior sheath, measures 1.6 cm in thickness and extends from the umbilicus to the inferior myotendinous junction  Cirrhosis  Mild splenomegaly-portal venous hypertension.  Chronic afib w Watchman device  CHF  BPH  GERD  ETOH abuse  seizures disorder    PLAN;    vent support-pat multiple failed weaning attempts- s/p trach weaning as tolerated  iv vanco for bactermia/ID eval for broaden coverage for multi organism & multiple site  r/o endocarditis  iv percedex  aerosols  supportive care  covid repeat testing-neg  d/w staff

## 2020-05-20 NOTE — PROGRESS NOTE ADULT - SUBJECTIVE AND OBJECTIVE BOX
Subjective:    pat still on vent fever 100.4, multiple cultures positive.    Home Medications:  albuterol 2.5 mg/3 mL (0.083%) inhalation solution: 3 milliliter(s) inhaled every 6 hours, As Needed -for shortness of breath and/or wheezing (19 Apr 2020 03:12)  gabapentin 400 mg oral capsule: 1 cap(s) orally 3 times a day (19 Apr 2020 03:12)  pantoprazole 40 mg oral granule, enteric coated: 40 milligram(s) orally once a day (19 Apr 2020 03:12)  Spiriva 18 mcg inhalation capsule: 1 cap(s) inhaled once a day (19 Apr 2020 03:12)  tamsulosin 0.4 mg oral capsule: 1 cap(s) orally once a day (at bedtime) (19 Apr 2020 03:12)  traZODone 50 mg oral tablet: 2 tab(s) orally once a day (at bedtime), As Needed (19 Apr 2020 03:12)    MEDICATIONS  (STANDING):  ALBUTerol    90 MICROgram(s) HFA Inhaler 2 Puff(s) Inhalation every 4 hours  budesonide 160 MICROgram(s)/formoterol 4.5 MICROgram(s) Inhaler 2 Puff(s) Inhalation two times a day  chlorhexidine 0.12% Liquid 15 milliLiter(s) Oral Mucosa every 12 hours  dexMEDEtomidine Infusion 0.7 MICROgram(s)/kG/Hr (20.6 mL/Hr) IV Continuous <Continuous>  diltiazem    Tablet 30 milliGRAM(s) Oral every 6 hours  enoxaparin Injectable 40 milliGRAM(s) SubCutaneous daily  gabapentin 400 milliGRAM(s) Oral three times a day  lactated ringers. 1000 milliLiter(s) (100 mL/Hr) IV Continuous <Continuous>  midodrine 10 milliGRAM(s) Oral every 8 hours  pantoprazole  Injectable 40 milliGRAM(s) IV Push daily  polyethylene glycol 3350 17 Gram(s) Oral daily  potassium phosphate / sodium phosphate powder 2 Packet(s) Oral two times a day  senna 2 Tablet(s) Oral at bedtime  thiamine 100 milliGRAM(s) Oral daily  tiotropium 18 MICROgram(s) Capsule 1 Capsule(s) Inhalation daily  vancomycin  IVPB 1000 milliGRAM(s) IV Intermittent every 12 hours    MEDICATIONS  (PRN):  acetaminophen   Tablet .. 650 milliGRAM(s) Oral every 6 hours PRN Temp greater or equal to 38.5C (101.3F)  ALPRAZolam 1 milliGRAM(s) Oral every 6 hours PRN anxiety  metoclopramide Injectable 10 milliGRAM(s) IV Push every 8 hours PRN Vomiting      Allergies    Ceclor (Rash)  Duricef (Rash)    Intolerances        Vital Signs Last 24 Hrs  T(C): 37 (20 May 2020 09:57), Max: 38.2 (20 May 2020 00:00)  T(F): 98.6 (20 May 2020 09:57), Max: 100.7 (20 May 2020 00:00)  HR: 104 (20 May 2020 11:31) (70 - 143)  BP: 132/64 (20 May 2020 10:00) (99/77 - 132/64)  BP(mean): 81 (20 May 2020 10:00) (65 - 97)  RR: 23 (20 May 2020 10:00) (19 - 27)  SpO2: 98% (20 May 2020 11:31) (84% - 100%)  Mode: AC/ CMV (Assist Control/ Continuous Mandatory Ventilation)  RR (machine): 15  TV (machine): 450  FiO2: 40  PEEP: 5  ITime: 1  MAP: 18  PIP: 32      PHYSICAL EXAMINATION:    NECK:  Supple. No lymphadenopathy. Jugular venous pressure not elevated. Carotids equal.   HEART:   The cardiac impulse has a normal quality. Reg., Nl S1 and S2.  There are no murmurs, rubs or gallops noted  CHEST:  Chest crackles to auscultation. Normal respiratory effort.  ABDOMEN:  Soft and nontender.   EXTREMITIES:  There is no edema.       LABS:                        9.0    3.50  )-----------( 145      ( 20 May 2020 06:41 )             28.1     05-20    136  |  103  |  13  ----------------------------<  159<H>  3.3<L>   |  25  |  0.44<L>    Ca    7.8<L>      20 May 2020 06:41  Phos  1.9     05-20  Mg     1.5     05-20    Culture - Blood (05.18.20 @ 11:44)    -  Staphylococcus aureus: Detec Any isolate of Staphylococcus aureus from a blood culture is NOT considered a contaminant.    Gram Stain:   Growth in aerobic bottle: Gram Positive Cocci in Clusters  Growth in anaerobic bottle: Gram Positive Cocci in Clusters    Specimen Source: .Blood None    Organism: Staphylococcus aureus    Culture Results:   Growth in aerobic bottle: Staphylococcus aureus  Growth in anaerobic bottle: Gram Positive Cocci in Clusters  ***Blood Panel PCR results on this specimen are available  approximately 3 hours after the Gram stain result.***  Gram stain, PCR, and/or culture results may not always  correspond due to difference in methodologies.  ************************************************************  This PCR assay was performed using [employer].  The following targets are tested for: Enterococcus,  vancomycin resistant enterococci, Listeria monocytogenes,  coagulase negative staphylococci, S. aureus,  methicillin resistant S. aureus, Streptococcus agalactiae  (Group B), S. pneumoniae, S. pyogenes (Group A),  Acinetobacter baumannii, Enterobacter cloacae, E. coli,  Klebsiella oxytoca, K. pneumoniae, Proteus sp.,  Serratia marcescens, Haemophilus influenzae,  Neisseria meningitidis, Pseudomonas aeruginosa, Candida  albicans, C. glabrata, C krusei, C parapsilosis,  C. tropicalis and the KPC resistance gene.  "Due to technical problems, Proteus sp. will Not be reported as part of  the BCID panel until further notice"  Culture - Sputum . (05.18.20 @ 09:45)    Culture Results:   Numerous Pseudomonas aeruginosa      Organism Identification: Staphylococcus aureus    Method Type: PCR

## 2020-05-20 NOTE — PROGRESS NOTE ADULT - ASSESSMENT
A/P: 65 male with acute respiratory failure from altered awareness and sedation  COPD  AFIB s/p watchman  CHF  HTN  ETOH abuse    Plan:   PULM: S/P Trach and PEG on 5/15.  Wean today.  Now on xanax atc.  Off Precedex.  Will start to decrease Xanax on 5/21     Cardio: Continue ASA and Plavix     Renal: Cr. Stable    GI: Feeds, PPI    ID: MRSA in BC from 5/18,  ID wants to await sensitivities for the PSA    PSY: Thiamine for chronic ETOH abuse and thiamine deficiency folate    Lovenox DVT Prophylaxis    D/W the patients daughter on a daily basis

## 2020-05-20 NOTE — PROGRESS NOTE ADULT - SUBJECTIVE AND OBJECTIVE BOX
Subjective: Trach with thick secretions. Tolerating TF. BM today. Off sedation. Remains on vent support 40% FiO2 PEEP 5.     Vital Signs:  Vital Signs Last 24 Hrs  T(C): 37.7 (05-20-20 @ 16:00), Max: 38.2 (05-20-20 @ 00:00)  T(F): 99.8 (05-20-20 @ 16:00), Max: 100.7 (05-20-20 @ 00:00)  HR: 125 (05-20-20 @ 17:22) (74 - 143)  BP: 132/64 (05-20-20 @ 10:00) (99/77 - 132/64)  RR: 23 (05-20-20 @ 10:00) (19 - 27)  SpO2: 94% (05-20-20 @ 17:22) (88% - 100%) on (O2)    I&O's Detail    19 May 2020 07:01  -  20 May 2020 07:00  --------------------------------------------------------  IN:    dexmedetomidine Infusion: 539 mL    Free Water: 120 mL    IV PiggyBack: 350 mL    lactated ringers.: 2120 mL    ns in tub fed  [id number]: 945 mL    Sodium Chloride 0.9% IV Bolus: 1000 mL  Total IN: 5074 mL    OUT:    Indwelling Catheter - Urethral: 425 mL  Total OUT: 425 mL    Total NET: 4649 mL      20 May 2020 07:01  -  20 May 2020 18:14  --------------------------------------------------------  IN:  Total IN: 0 mL    OUT:    Indwelling Catheter - Urethral: 700 mL  Total OUT: 700 mL    Total NET: -700 mL          General: NAD  Neurology: Awake  Eyes: Scleras clear  Neck: Neck supple, trachea midline, No JVD, trach in place with thick secretions today   Respiratory: B/L BS  CV: S1S2  Abdominal: Soft, distended, PEG in place with no drainage or erythema    Relevant labs, radiology and Medications reviewed                        9.0    3.50  )-----------( 145      ( 20 May 2020 06:41 )             28.1     05-20    136  |  103  |  13  ----------------------------<  159<H>  3.3<L>   |  25  |  0.44<L>    Ca    7.8<L>      20 May 2020 06:41  Phos  1.9     05-20  Mg     1.5     05-20        MEDICATIONS  (STANDING):  ALBUTerol    90 MICROgram(s) HFA Inhaler 2 Puff(s) Inhalation every 4 hours  budesonide 160 MICROgram(s)/formoterol 4.5 MICROgram(s) Inhaler 2 Puff(s) Inhalation two times a day  chlorhexidine 0.12% Liquid 15 milliLiter(s) Oral Mucosa every 12 hours  diltiazem    Tablet 60 milliGRAM(s) Oral every 6 hours  enoxaparin Injectable 40 milliGRAM(s) SubCutaneous daily  gabapentin 400 milliGRAM(s) Oral three times a day  lactated ringers. 1000 milliLiter(s) (100 mL/Hr) IV Continuous <Continuous>  midodrine 10 milliGRAM(s) Oral every 8 hours  pantoprazole  Injectable 40 milliGRAM(s) IV Push daily  polyethylene glycol 3350 17 Gram(s) Oral daily  potassium phosphate / sodium phosphate powder 2 Packet(s) Oral two times a day  senna 2 Tablet(s) Oral at bedtime  thiamine 100 milliGRAM(s) Oral daily  tiotropium 18 MICROgram(s) Capsule 1 Capsule(s) Inhalation daily  vancomycin  IVPB 1000 milliGRAM(s) IV Intermittent every 12 hours    MEDICATIONS  (PRN):  acetaminophen   Tablet .. 650 milliGRAM(s) Oral every 6 hours PRN Temp greater or equal to 38.5C (101.3F)  ALPRAZolam 1 milliGRAM(s) Oral every 6 hours PRN anxiety  metoclopramide Injectable 10 milliGRAM(s) IV Push every 8 hours PRN Vomiting        Assessment  65y Male  w/ PAST MEDICAL & SURGICAL HISTORY:  Chronic CHF  COPD without exacerbation  Atrial fibrillation  Presence of Watchman left atrial appendage closure device  Hypertension  GERD (gastroesophageal reflux disease)  Chronic obstructive pulmonary disease (COPD)  Anemia  Falls  Meningitis  Collapsed lung  Alcohol withdrawal  Emphysema of lung  Cirrhosis  CHF (congestive heart failure)  Poor historian  Alcohol abuse  Chronic atrial fibrillation  Unilateral amputation of lower extremity below knee  Presence of Watchman left atrial appendage closure device  S/P BKA (below knee amputation) unilateral, left  admitted with complaints of Patient is a 65y old  Male who presents with a chief complaint of acute hypoxemic, hypercapneic resp failure  COPD exacerbation (20 May 2020 13:09)   patient underwent Bronchoscopy with creation of tracheostomy and insertion of percutaneous endoscopic gastrostomy (PEG) tube  Insertion, nasogastric tube

## 2020-05-20 NOTE — PROGRESS NOTE ADULT - SUBJECTIVE AND OBJECTIVE BOX
Date of service: 05-20-20 @ 10:10    Lying in bed in NAD  Weak looking  Has low grade fever  Reported with bacteria on sputum gram stain and culture    ROS: unobtainable    MEDICATIONS  (STANDING):  ALBUTerol    90 MICROgram(s) HFA Inhaler 2 Puff(s) Inhalation every 4 hours  budesonide 160 MICROgram(s)/formoterol 4.5 MICROgram(s) Inhaler 2 Puff(s) Inhalation two times a day  chlorhexidine 0.12% Liquid 15 milliLiter(s) Oral Mucosa every 12 hours  dexMEDEtomidine Infusion 0.7 MICROgram(s)/kG/Hr (20.6 mL/Hr) IV Continuous <Continuous>  diltiazem    Tablet 30 milliGRAM(s) Oral every 6 hours  enoxaparin Injectable 40 milliGRAM(s) SubCutaneous daily  gabapentin 400 milliGRAM(s) Oral three times a day  lactated ringers. 1000 milliLiter(s) (100 mL/Hr) IV Continuous <Continuous>  midodrine 10 milliGRAM(s) Oral every 8 hours  pantoprazole  Injectable 40 milliGRAM(s) IV Push daily  polyethylene glycol 3350 17 Gram(s) Oral daily  potassium chloride   Powder 40 milliEquivalent(s) Oral once  potassium phosphate / sodium phosphate powder 2 Packet(s) Oral two times a day  senna 2 Tablet(s) Oral at bedtime  thiamine 100 milliGRAM(s) Oral daily  tiotropium 18 MICROgram(s) Capsule 1 Capsule(s) Inhalation daily  vancomycin  IVPB 1000 milliGRAM(s) IV Intermittent every 12 hours      Vital Signs Last 24 Hrs  T(C): 37 (20 May 2020 09:57), Max: 38.2 (20 May 2020 00:00)  T(F): 98.6 (20 May 2020 09:57), Max: 100.7 (20 May 2020 00:00)  HR: 99 (20 May 2020 10:00) (70 - 143)  BP: 132/64 (20 May 2020 10:00) (99/77 - 132/64)  BP(mean): 81 (20 May 2020 10:00) (65 - 97)  RR: 23 (20 May 2020 10:00) (16 - 27)  SpO2: 100% (20 May 2020 10:00) (84% - 100%)    Mode: AC/ CMV (Assist Control/ Continuous Mandatory Ventilation), RR (machine): 15, TV (machine): 450, FiO2: 40, PEEP: 5, ITime: 1, MAP: 18, PIP: 40    Physical exam:    Constitutional:  No acute distress  HEENT: NC/AT, EOMI, PERRLA, conjunctivae clear  Neck: supple; thyroid not palpable  Back: no tenderness  Respiratory: respiratory effort normal; b/l rhonchi  Cardiovascular: S1S2 regular, no murmurs  Abdomen: soft, not tender, not distended, positive BS  Genitourinary: no suprapubic tenderness  Lymphatic: no LN palpable  Musculoskeletal: no muscle tenderness, no joint swelling or tenderness  Extremities: no pedal edema  Right arm area of erythema with phlebitis - slightly   Neurological/ Psychiatric: confused; moving all extremities  Skin: no rashes; no palpable lesions    Labs: reviewed                        9.0    3.50  )-----------( 145      ( 20 May 2020 06:41 )             28.1     05-20    136  |  103  |  13  ----------------------------<  159<H>  3.3<L>   |  25  |  0.44<L>    Ca    7.8<L>      20 May 2020 06:41  Phos  1.9     05-20  Mg     1.5     05-20                          12.5   8.31  )-----------( 197      ( 07 May 2020 08:15 )             37.6     05-07    137  |  100  |  33<H>  ----------------------------<  114<H>  4.9   |  31  |  0.83    Ca    8.5      07 May 2020 08:15  Phos  4.2     05-07  Mg     2.4     05-07      COVID-19 PCR . (04.26.20 @ 00:02)    COVID-19 PCR: NotDetec      Culture - Blood (collected 26 Apr 2020 01:01)  Source: .Blood None  Preliminary Report (27 Apr 2020 10:02):    No growth to date.    Culture - Blood (collected 26 Apr 2020 00:56)  Source: .Blood None  Preliminary Report (27 Apr 2020 10:02):    No growth to date.    Culture - Sputum (collected 25 Apr 2020 01:00)  Source: .Sputum Sputum  trap  Gram Stain (26 Apr 2020 12:29):    Moderate polymorphonuclear leukocytes per low power field    No Squamous epithelial cells per low power field    Rare Gram Positive Rods per oil power field    Rare Gram Positive Cocci in Pairs and Chains per oil power field  Final Report (28 Apr 2020 10:53):    Few Pseudomonas aeruginosa (Carbapenem Resistant)    Moderate Methicillin resistant Staphylococcus aureus    Normal Respiratory Faith present  Organism: Pseudomonas aeruginosa (Carbapenem Resistant)  Methicillin resistant Staphylococcus aureus (28 Apr 2020 10:44)  Organism: Pseudomonas aeruginosa (Carbapenem Resistant) (28 Apr 2020 10:44)      -  Amikacin: S <=16      -  Aztreonam: R >16      -  Cefepime: R >16      -  Ceftazidime: R >16      -  Ciprofloxacin: S <=0.25      -  Gentamicin: S 4      -  Imipenem: R >8      -  Levofloxacin: S <=0.5      -  Meropenem: R >8      -  Piperacillin/Tazobactam: R >64      -  Tobramycin: S <=2      Method Type: LADY  Organism: Methicillin resistant Staphylococcus aureus (28 Apr 2020 10:40)      -  Amoxicillin/Clavulanic Acid: R >4/2      -  Ampicillin: R >8      -  Ampicillin/Sulbactam: R <=8/4      -  Cefazolin: R >16      -  Ceftriaxone: R >32      -  Ciprofloxacin: R >2      -  Clindamycin: R >4      -  Daptomycin: S 0.5      -  Erythromycin: R >4      -  Gentamicin: S <=1 Should not be used as monotherapy      -  Levofloxacin: R >4      -  Linezolid: S 4      -  Meropenem: R >8      -  Moxifloxacin(Aerobic): R >4      -  Oxacillin: R >2      -  Penicillin: R >8      -  RIF- Rifampin: S <=1 Should not be used as monotherapy      -  Tetra/Doxy: S 2      -  Trimethoprim/Sulfamethoxazole: S <=0.5/9.5      -  Vancomycin: S 2      Method Type: LADY    Culture - Urine (collected 21 Apr 2020 15:51)  Source: .Urine Clean Catch (Midstream)  Final Report (22 Apr 2020 17:03):    <10,000 CFU/mL Normal Urogenital Faith    Culture - Blood (collected 18 Apr 2020 19:43)  Source: .Blood Blood-Peripheral  Final Report (24 Apr 2020 01:01):    No Growth Final    Culture - Sputum . (04.29.20 @ 15:00)    -  Trimethoprim/Sulfamethoxazole: S <=0.5/9.5    -  Vancomycin: S 2    -  Tetra/Doxy: S 2    -  Gentamicin: S <=1 Should not be used as monotherapy    -  Erythromycin: R >4    -  Clindamycin: R >4    -  Cefazolin: R >16    -  Linezolid: S 4    -  Oxacillin: R >2    -  Penicillin: R >8    -  RIF- Rifampin: S <=1 Should not be used as monotherapy    -  Ampicillin/Sulbactam: R <=8/4    Gram Stain:   Numerous polymorphonuclear leukocytes per low power field  No Squamous epithelial cells per low power field  Numerous Gram Positive Cocci in Clusters per oil power field  Few Gram Positive Rods per oil power field  Few Gram Negative Rods per oil power field    Specimen Source: .Sputum Sputum    Culture Results:   Numerous Methicillin resistant Staphylococcus aureus  Normal Respiratory Faith present    Organism Identification: Methicillin resistant Staphylococcus aureus    Organism: Methicillin resistant Staphylococcus aureus    Method Type: LADY    Culture - Blood (05.18.20 @ 11:53)    Gram Stain:   Growth in aerobic bottle: Gram Positive Cocci in Clusters  Growth in anaerobic bottle: Gram Positive Cocci in Clusters    Specimen Source: .Blood Blood    Culture Results:   Growth in aerobic bottle: Gram Positive Cocci in Clusters  Growth in anaerobic bottle: Gram Positive Cocci in Clusters    Culture - Sputum . (05.18.20 @ 09:45)    Gram Stain:   Numerous polymorphonuclear leukocytes per low power field  No Squamous epithelial cells per low power field  Numerous Gram Negative Rods per oil power field    Specimen Source: .Sputum Sputums trap    Culture Results:   Numerous Pseudomonas aeruginosa    COVID-19 PCR . (05.14.20 @ 08:10)    COVID-19 PCR: NotDetec    Radiology: all available radiological tests reviewed    < from: Xray Chest 1 View- PORTABLE-Urgent (05.18.20 @ 09:49) >  A tracheostomy tube is noted.  Lungs: There are no lung consolidations.  Heart: There is cardiomegaly.  Mediastinum: The mediastinum is within normal limits.    < end of copied text >    Advanced directives addressed: full resuscitation

## 2020-05-21 LAB
ANION GAP SERPL CALC-SCNC: 8 MMOL/L — SIGNIFICANT CHANGE UP (ref 5–17)
BUN SERPL-MCNC: 7 MG/DL — SIGNIFICANT CHANGE UP (ref 7–23)
CALCIUM SERPL-MCNC: 8.3 MG/DL — LOW (ref 8.5–10.1)
CHLORIDE SERPL-SCNC: 107 MMOL/L — SIGNIFICANT CHANGE UP (ref 96–108)
CO2 SERPL-SCNC: 28 MMOL/L — SIGNIFICANT CHANGE UP (ref 22–31)
CREAT SERPL-MCNC: 0.39 MG/DL — LOW (ref 0.5–1.3)
GLUCOSE SERPL-MCNC: 93 MG/DL — SIGNIFICANT CHANGE UP (ref 70–99)
GRAM STN FLD: SIGNIFICANT CHANGE UP
GRAM STN FLD: SIGNIFICANT CHANGE UP
HCT VFR BLD CALC: 30.4 % — LOW (ref 39–50)
HGB BLD-MCNC: 10 G/DL — LOW (ref 13–17)
MAGNESIUM SERPL-MCNC: 1.5 MG/DL — LOW (ref 1.6–2.6)
MCHC RBC-ENTMCNC: 31.4 PG — SIGNIFICANT CHANGE UP (ref 27–34)
MCHC RBC-ENTMCNC: 32.9 GM/DL — SIGNIFICANT CHANGE UP (ref 32–36)
MCV RBC AUTO: 95.6 FL — SIGNIFICANT CHANGE UP (ref 80–100)
PHOSPHATE SERPL-MCNC: 3.1 MG/DL — SIGNIFICANT CHANGE UP (ref 2.5–4.5)
PLATELET # BLD AUTO: 169 K/UL — SIGNIFICANT CHANGE UP (ref 150–400)
POTASSIUM SERPL-MCNC: 3.5 MMOL/L — SIGNIFICANT CHANGE UP (ref 3.5–5.3)
POTASSIUM SERPL-SCNC: 3.5 MMOL/L — SIGNIFICANT CHANGE UP (ref 3.5–5.3)
RBC # BLD: 3.18 M/UL — LOW (ref 4.2–5.8)
RBC # FLD: 13 % — SIGNIFICANT CHANGE UP (ref 10.3–14.5)
SODIUM SERPL-SCNC: 143 MMOL/L — SIGNIFICANT CHANGE UP (ref 135–145)
SPECIMEN SOURCE: SIGNIFICANT CHANGE UP
SPECIMEN SOURCE: SIGNIFICANT CHANGE UP
VANCOMYCIN TROUGH SERPL-MCNC: 9.8 UG/ML — LOW (ref 10–20)
WBC # BLD: 5.43 K/UL — SIGNIFICANT CHANGE UP (ref 3.8–10.5)
WBC # FLD AUTO: 5.43 K/UL — SIGNIFICANT CHANGE UP (ref 3.8–10.5)

## 2020-05-21 PROCEDURE — 99232 SBSQ HOSP IP/OBS MODERATE 35: CPT

## 2020-05-21 PROCEDURE — 99291 CRITICAL CARE FIRST HOUR: CPT

## 2020-05-21 RX ORDER — CIPROFLOXACIN LACTATE 400MG/40ML
400 VIAL (ML) INTRAVENOUS EVERY 12 HOURS
Refills: 0 | Status: DISCONTINUED | OUTPATIENT
Start: 2020-05-21 | End: 2020-05-28

## 2020-05-21 RX ORDER — POTASSIUM CHLORIDE 20 MEQ
40 PACKET (EA) ORAL ONCE
Refills: 0 | Status: COMPLETED | OUTPATIENT
Start: 2020-05-21 | End: 2020-05-21

## 2020-05-21 RX ORDER — CIPROFLOXACIN LACTATE 400MG/40ML
VIAL (ML) INTRAVENOUS
Refills: 0 | Status: DISCONTINUED | OUTPATIENT
Start: 2020-05-21 | End: 2020-05-28

## 2020-05-21 RX ORDER — DILTIAZEM HCL 120 MG
10 CAPSULE, EXT RELEASE 24 HR ORAL ONCE
Refills: 0 | Status: COMPLETED | OUTPATIENT
Start: 2020-05-21 | End: 2020-05-21

## 2020-05-21 RX ORDER — CIPROFLOXACIN LACTATE 400MG/40ML
400 VIAL (ML) INTRAVENOUS ONCE
Refills: 0 | Status: COMPLETED | OUTPATIENT
Start: 2020-05-21 | End: 2020-05-21

## 2020-05-21 RX ORDER — ALPRAZOLAM 0.25 MG
0.5 TABLET ORAL EVERY 6 HOURS
Refills: 0 | Status: DISCONTINUED | OUTPATIENT
Start: 2020-05-21 | End: 2020-05-23

## 2020-05-21 RX ORDER — MAGNESIUM SULFATE 500 MG/ML
2 VIAL (ML) INJECTION
Refills: 0 | Status: COMPLETED | OUTPATIENT
Start: 2020-05-21 | End: 2020-05-21

## 2020-05-21 RX ADMIN — Medication 40 MILLIEQUIVALENT(S): at 11:20

## 2020-05-21 RX ADMIN — ALBUTEROL 2 PUFF(S): 90 AEROSOL, METERED ORAL at 16:10

## 2020-05-21 RX ADMIN — Medication 50 GRAM(S): at 10:48

## 2020-05-21 RX ADMIN — MIDODRINE HYDROCHLORIDE 10 MILLIGRAM(S): 2.5 TABLET ORAL at 06:15

## 2020-05-21 RX ADMIN — ENOXAPARIN SODIUM 40 MILLIGRAM(S): 100 INJECTION SUBCUTANEOUS at 11:20

## 2020-05-21 RX ADMIN — ALBUTEROL 2 PUFF(S): 90 AEROSOL, METERED ORAL at 16:09

## 2020-05-21 RX ADMIN — Medication 50 GRAM(S): at 11:21

## 2020-05-21 RX ADMIN — Medication 0.5 MILLIGRAM(S): at 18:00

## 2020-05-21 RX ADMIN — GABAPENTIN 400 MILLIGRAM(S): 400 CAPSULE ORAL at 11:20

## 2020-05-21 RX ADMIN — GABAPENTIN 400 MILLIGRAM(S): 400 CAPSULE ORAL at 06:15

## 2020-05-21 RX ADMIN — Medication 60 MILLIGRAM(S): at 06:15

## 2020-05-21 RX ADMIN — Medication 60 MILLIGRAM(S): at 11:20

## 2020-05-21 RX ADMIN — Medication 100 MILLIGRAM(S): at 10:50

## 2020-05-21 RX ADMIN — BUDESONIDE AND FORMOTEROL FUMARATE DIHYDRATE 2 PUFF(S): 160; 4.5 AEROSOL RESPIRATORY (INHALATION) at 21:26

## 2020-05-21 RX ADMIN — GABAPENTIN 400 MILLIGRAM(S): 400 CAPSULE ORAL at 21:49

## 2020-05-21 RX ADMIN — Medication 60 MILLIGRAM(S): at 17:59

## 2020-05-21 RX ADMIN — Medication 2 PACKET(S): at 17:59

## 2020-05-21 RX ADMIN — SENNA PLUS 2 TABLET(S): 8.6 TABLET ORAL at 21:49

## 2020-05-21 RX ADMIN — Medication 0.5 MILLIGRAM(S): at 13:35

## 2020-05-21 RX ADMIN — MIDODRINE HYDROCHLORIDE 10 MILLIGRAM(S): 2.5 TABLET ORAL at 21:49

## 2020-05-21 RX ADMIN — Medication 10 MILLIGRAM(S): at 00:30

## 2020-05-21 RX ADMIN — Medication 1 MILLIGRAM(S): at 01:00

## 2020-05-21 RX ADMIN — MIDODRINE HYDROCHLORIDE 10 MILLIGRAM(S): 2.5 TABLET ORAL at 13:36

## 2020-05-21 RX ADMIN — ALBUTEROL 2 PUFF(S): 90 AEROSOL, METERED ORAL at 16:08

## 2020-05-21 RX ADMIN — CHLORHEXIDINE GLUCONATE 15 MILLILITER(S): 213 SOLUTION TOPICAL at 06:15

## 2020-05-21 RX ADMIN — CHLORHEXIDINE GLUCONATE 15 MILLILITER(S): 213 SOLUTION TOPICAL at 17:59

## 2020-05-21 RX ADMIN — PANTOPRAZOLE SODIUM 40 MILLIGRAM(S): 20 TABLET, DELAYED RELEASE ORAL at 10:49

## 2020-05-21 RX ADMIN — Medication 2 PACKET(S): at 06:15

## 2020-05-21 RX ADMIN — Medication 200 MILLIGRAM(S): at 19:41

## 2020-05-21 RX ADMIN — ALBUTEROL 2 PUFF(S): 90 AEROSOL, METERED ORAL at 03:19

## 2020-05-21 RX ADMIN — Medication 1 MILLIGRAM(S): at 10:47

## 2020-05-21 RX ADMIN — BUDESONIDE AND FORMOTEROL FUMARATE DIHYDRATE 2 PUFF(S): 160; 4.5 AEROSOL RESPIRATORY (INHALATION) at 16:11

## 2020-05-21 RX ADMIN — Medication 250 MILLIGRAM(S): at 06:15

## 2020-05-21 RX ADMIN — ALBUTEROL 2 PUFF(S): 90 AEROSOL, METERED ORAL at 21:24

## 2020-05-21 RX ADMIN — Medication 250 MILLIGRAM(S): at 17:58

## 2020-05-21 NOTE — PROGRESS NOTE ADULT - ASSESSMENT
PROBLEMS;    New fever-staph bactermia/sputum pseudomonas/staph  Ac on chronic hypercapnic & hypoxamic respiratory failure-s/p trach & PEG  sputum-Few Pseudomonas aeruginosa (Carbapenem Resistant)/Moderate Methicillin resistant Staphylococcus aureus  afib with RVR  [facility]/VIJAY  AC flare of COPD/chronic vs acute on chronic bronchitis  Mild interstitial lung disease-NSIP h/o smoking  COVID negativex2  Pulmonary hypertension  Nonobstructing stone in the left ureterovesicular junction/ nonobstructing stone in the lower pole right kidney.  Subacute hemorrhage in the right rectus abdominis along the anterior sheath, measures 1.6 cm in thickness and extends from the umbilicus to the inferior myotendinous junction  Cirrhosis  Mild splenomegaly-portal venous hypertension.  Chronic afib w Watchman device  CHF  BPH  GERD  ETOH abuse  seizures disorder    PLAN;    vent support-pat multiple failed weaning attempts- s/p trach weaning as tolerated  iv vanco for bactermia/iv cipro for pseudomonas  supportive care  covid repeat testing-neg  d/w staff

## 2020-05-21 NOTE — PROGRESS NOTE ADULT - SUBJECTIVE AND OBJECTIVE BOX
[id number]  ICU # 28  Vent # 22 and Trach and PEG # 6    CC:  Respiratory Failure     HPI:    64 y/o male w chronic AFib w Watchman device, HFpEF , COPD on home O2, HTN, ETOH use and pt of size admitted on 4/18 with COPD exacerbation--RRT called on 4/23 for AMS requiring intubation--extubated on 4/26 then re intubated on 4/29.  S/P Trach and PEG on 5/15 and has failed SBT.  Most recent active issues is persistent MRSA sepsis and CRP in sputum--on IV vanco and Cipro.  No central access      PMH:  As above.     PSH:  As above.     FH: Non Contributory other than those listed in HPI    Social History:  Unobtainable due to clinical condition     MEDICATIONS  (STANDING):  ALBUTerol    90 MICROgram(s) HFA Inhaler 2 Puff(s) Inhalation every 4 hours  ALPRAZolam 0.5 milliGRAM(s) Oral every 6 hours  budesonide 160 MICROgram(s)/formoterol 4.5 MICROgram(s) Inhaler 2 Puff(s) Inhalation two times a day  chlorhexidine 0.12% Liquid 15 milliLiter(s) Oral Mucosa every 12 hours  ciprofloxacin   IVPB      ciprofloxacin   IVPB 400 milliGRAM(s) IV Intermittent once  ciprofloxacin   IVPB 400 milliGRAM(s) IV Intermittent every 12 hours  diltiazem    Tablet 60 milliGRAM(s) Oral every 6 hours  enoxaparin Injectable 40 milliGRAM(s) SubCutaneous daily  gabapentin 400 milliGRAM(s) Oral three times a day  midodrine 10 milliGRAM(s) Oral every 8 hours  pantoprazole  Injectable 40 milliGRAM(s) IV Push daily  polyethylene glycol 3350 17 Gram(s) Oral daily  potassium phosphate / sodium phosphate powder 2 Packet(s) Oral two times a day  senna 2 Tablet(s) Oral at bedtime  thiamine 100 milliGRAM(s) Oral daily  tiotropium 18 MICROgram(s) Capsule 1 Capsule(s) Inhalation daily  vancomycin  IVPB 1000 milliGRAM(s) IV Intermittent every 12 hours    MEDICATIONS  (PRN):  acetaminophen   Tablet .. 650 milliGRAM(s) Oral every 6 hours PRN Temp greater or equal to 38.5C (101.3F)  metoclopramide Injectable 10 milliGRAM(s) IV Push every 8 hours PRN Vomiting      Allergies: NKDA    ROS:  SEE BELOW        ICU Vital Signs Last 24 Hrs  T(C): 37.4 (21 May 2020 10:18), Max: 37.4 (21 May 2020 10:18)  T(F): 99.3 (21 May 2020 10:18), Max: 99.3 (21 May 2020 10:18)  HR: 117 (21 May 2020 13:00) (102 - 153)  BP: 123/69 (21 May 2020 13:00) (84/68 - 143/69)  BP(mean): 83 (21 May 2020 13:00) (54 - 128)  ABP: --  ABP(mean): --  RR: 13 (21 May 2020 13:00) (13 - 25)  SpO2: 100% (21 May 2020 13:00) (88% - 100%)      Mode: AC/ CMV (Assist Control/ Continuous Mandatory Ventilation)  RR (machine): 15  TV (machine): 450  FiO2: 40  PEEP: 5  ITime: 1  MAP: 14  PIP: 38      I&O's Summary    20 May 2020 07:01  -  21 May 2020 07:00  --------------------------------------------------------  IN: 3170 mL / OUT: 2000 mL / NET: 1170 mL    21 May 2020 07:01  -  21 May 2020 16:50  --------------------------------------------------------  IN: 50 mL / OUT: 150 mL / NET: -100 mL        Physical Exam:  SEE BELOW                          10.0   5.43  )-----------( 169      ( 21 May 2020 04:52 )             30.4       05-21    143  |  107  |  7   ----------------------------<  93  3.5   |  28  |  0.39<L>    Ca    8.3<L>      21 May 2020 04:52  Phos  3.1     05-21  Mg     1.5     05-21                      DVT Prophylaxis:                                                            Contraindication:     Advanced Directives:    Discussed with:    Visit Information:  Time spent excluding procedure:      ** Time is exclusive of billed procedures and/or teaching and/or routine family updates.

## 2020-05-21 NOTE — PROGRESS NOTE ADULT - SUBJECTIVE AND OBJECTIVE BOX
HPI: Patient seen at a  this morning for AMS and hypoxic  Patient had been admitted with a COPD exacerbation.  It was believed that he went into ETOH withdrawal and had a total of 6MG Ativan over night.  He required intubation 4/23.    4/24: he remains intubated.  Will try to wean this afternoon.      4/25: He weaned this morning and failed with a decreased Tv.  But close to extubation.    4/26: Patient weaned well this morning and was extubated.      5/2: Chart reviewed, Patient was reintubated, failed weaning this morning, CXR with improving infiltrates  5/3: Patient remains on the vent failed weaning x3 today with increased RR and low TV, Off Levophed now  5/4: Failed weaning yesterday, had to change his ET tube yesterday because he chewed through it,     5/5: Pt on Precedex and Diprivan for sedation, Patient failed weaning again today  5/6: Patient failed weaning today with a low TV and High RR  5/7: He again failed weaning, NGT replaced, Fi O2 back to 40%, WBC normal,     5/8: For weaning today, he has failed weaning daily  5/9: Weaning this morning off levo  5/10: He Failed weaning this morning    5/14: Patient failed weaning again this morning.  For Trach and PEG in 5/15    5/17: S/P Trach and PEG.  Weaned for several hours yesterday  5/18: Weaned for under an hour yesterday but desated.  Temps to 104 x 2 over night.    5/19: No Weaning yesterday because of the ongoing temps.  Now with MRSA in the Blood.    5/20: Temps down, now off precedex  5/21: Failed weaning yesterday, BC remain positive      PAST MEDICAL & SURGICAL HISTORY:  Chronic CHF  COPD without exacerbation  Atrial fibrillation  Presence of Watchman left atrial appendage closure device  Hypertension  GERD (gastroesophageal reflux disease)  Chronic obstructive pulmonary disease (COPD)  Anemia  Falls  Meningitis  Collapsed lung  Alcohol withdrawal  Emphysema of lung  Cirrhosis  CHF (congestive heart failure)  Poor historian  Alcohol abuse  Chronic atrial fibrillation  Unilateral amputation of lower extremity below knee  Presence of Watchman left atrial appendage closure device  S/P BKA (below knee amputation) unilateral, left      FAMILY HISTORY:  No pertinent family history in first degree relatives  Family history unknown: Adopted      Social Hx:    Allergies    Ceclor (Rash)  Duricef (Rash)    Intolerances            ICU Vital Signs Last 24 Hrs  T(C): 37.4 (21 May 2020 10:18), Max: 37.7 (20 May 2020 16:00)  T(F): 99.3 (21 May 2020 10:18), Max: 99.8 (20 May 2020 16:00)  HR: 137 (21 May 2020 07:00) (102 - 153)  BP: 138/75 (21 May 2020 07:00) (84/68 - 143/69)  BP(mean): 89 (21 May 2020 07:00) (54 - 120)  ABP: --  ABP(mean): --  RR: 25 (21 May 2020 06:01) (13 - 31)  SpO2: 100% (21 May 2020 07:00) (88% - 100%)      Mode: AC/ CMV (Assist Control/ Continuous Mandatory Ventilation)  RR (machine): 15  TV (machine): 450  FiO2: 40  PEEP: 5  ITime: 1  MAP: 14  PIP: 29      I&O's Summary    20 May 2020 07:01  -  21 May 2020 07:00  --------------------------------------------------------  IN: 3170 mL / OUT: 2000 mL / NET: 1170 mL                              10.0   5.43  )-----------( 169      ( 21 May 2020 04:52 )             30.4       05-21    143  |  107  |  7   ----------------------------<  93  3.5   |  28  |  0.39<L>    Ca    8.3<L>      21 May 2020 04:52  Phos  3.1     05-21  Mg     1.5     05-21                      MEDICATIONS  (STANDING):  ALBUTerol    90 MICROgram(s) HFA Inhaler 2 Puff(s) Inhalation every 4 hours  budesonide 160 MICROgram(s)/formoterol 4.5 MICROgram(s) Inhaler 2 Puff(s) Inhalation two times a day  chlorhexidine 0.12% Liquid 15 milliLiter(s) Oral Mucosa every 12 hours  diltiazem    Tablet 60 milliGRAM(s) Oral every 6 hours  enoxaparin Injectable 40 milliGRAM(s) SubCutaneous daily  gabapentin 400 milliGRAM(s) Oral three times a day  magnesium sulfate  IVPB 2 Gram(s) IV Intermittent every 2 hours  midodrine 10 milliGRAM(s) Oral every 8 hours  pantoprazole  Injectable 40 milliGRAM(s) IV Push daily  polyethylene glycol 3350 17 Gram(s) Oral daily  potassium chloride   Powder 40 milliEquivalent(s) Oral once  potassium phosphate / sodium phosphate powder 2 Packet(s) Oral two times a day  senna 2 Tablet(s) Oral at bedtime  thiamine 100 milliGRAM(s) Oral daily  tiotropium 18 MICROgram(s) Capsule 1 Capsule(s) Inhalation daily  vancomycin  IVPB 1000 milliGRAM(s) IV Intermittent every 12 hours    MEDICATIONS  (PRN):  acetaminophen   Tablet .. 650 milliGRAM(s) Oral every 6 hours PRN Temp greater or equal to 38.5C (101.3F)  ALPRAZolam 1 milliGRAM(s) Oral every 6 hours PRN anxiety  metoclopramide Injectable 10 milliGRAM(s) IV Push every 8 hours PRN Vomiting      DVT Prophylaxis:    Advanced Directives:  Discussed with:    Visit Information: 30 min    ** Time is exclusive of billed procedures and/or teaching and/or routine family updates.

## 2020-05-21 NOTE — PROGRESS NOTE ADULT - SUBJECTIVE AND OBJECTIVE BOX
Date of service: 05-21-20 @ 13:00    On ventilatory support  Reported with new bacteremia  Has low grade fever    ROS: poorly verbal    MEDICATIONS  (STANDING):  ALBUTerol    90 MICROgram(s) HFA Inhaler 2 Puff(s) Inhalation every 4 hours  ALPRAZolam 0.5 milliGRAM(s) Oral every 6 hours  budesonide 160 MICROgram(s)/formoterol 4.5 MICROgram(s) Inhaler 2 Puff(s) Inhalation two times a day  chlorhexidine 0.12% Liquid 15 milliLiter(s) Oral Mucosa every 12 hours  ciprofloxacin   IVPB      ciprofloxacin   IVPB 400 milliGRAM(s) IV Intermittent once  ciprofloxacin   IVPB 400 milliGRAM(s) IV Intermittent every 12 hours  diltiazem    Tablet 60 milliGRAM(s) Oral every 6 hours  enoxaparin Injectable 40 milliGRAM(s) SubCutaneous daily  gabapentin 400 milliGRAM(s) Oral three times a day  midodrine 10 milliGRAM(s) Oral every 8 hours  pantoprazole  Injectable 40 milliGRAM(s) IV Push daily  polyethylene glycol 3350 17 Gram(s) Oral daily  potassium phosphate / sodium phosphate powder 2 Packet(s) Oral two times a day  senna 2 Tablet(s) Oral at bedtime  thiamine 100 milliGRAM(s) Oral daily  tiotropium 18 MICROgram(s) Capsule 1 Capsule(s) Inhalation daily  vancomycin  IVPB 1000 milliGRAM(s) IV Intermittent every 12 hours      Vital Signs Last 24 Hrs  T(C): 37.4 (21 May 2020 10:18), Max: 37.7 (20 May 2020 16:00)  T(F): 99.3 (21 May 2020 10:18), Max: 99.8 (20 May 2020 16:00)  HR: 136 (21 May 2020 11:00) (102 - 153)  BP: 138/75 (21 May 2020 07:00) (84/68 - 143/69)  BP(mean): 89 (21 May 2020 07:00) (54 - 96)  RR: 19 (21 May 2020 11:00) (13 - 31)  SpO2: 93% (21 May 2020 11:00) (88% - 100%)    Mode: AC/ CMV (Assist Control/ Continuous Mandatory Ventilation), RR (machine): 15, TV (machine): 450, FiO2: 40, PEEP: 5, ITime: 1, MAP: 14, PIP: 28    Physical exam:    Constitutional:  No acute distress  HEENT: NC/AT, EOMI, PERRLA, conjunctivae clear  Neck: supple; thyroid not palpable  Back: no tenderness  Respiratory: respiratory effort normal; b/l rhonchi  Cardiovascular: S1S2 regular, no murmurs  Abdomen: soft, not tender, not distended, positive BS  Genitourinary: no suprapubic tenderness  Lymphatic: no LN palpable  Musculoskeletal: no muscle tenderness, no joint swelling or tenderness  Extremities: no pedal edema  Right arm area of erythema with phlebitis - slightly   Neurological/ Psychiatric: confused; moving all extremities  Skin: no rashes; no palpable lesions    Labs: reviewed                        10.0   5.43  )-----------( 169      ( 21 May 2020 04:52 )             30.4     05-21    143  |  107  |  7   ----------------------------<  93  3.5   |  28  |  0.39<L>    Ca    8.3<L>      21 May 2020 04:52  Phos  3.1     05-21  Mg     1.5     05-21    Vancomycin Level, Trough: 9.8 ug/mL (05-21 @ 04:52)                                  12.5   8.31  )-----------( 197      ( 07 May 2020 08:15 )             37.6     05-07    137  |  100  |  33<H>  ----------------------------<  114<H>  4.9   |  31  |  0.83    Ca    8.5      07 May 2020 08:15  Phos  4.2     05-07  Mg     2.4     05-07      COVID-19 PCR . (04.26.20 @ 00:02)    COVID-19 PCR: NotDetec      Culture - Blood (collected 26 Apr 2020 01:01)  Source: .Blood None  Preliminary Report (27 Apr 2020 10:02):    No growth to date.    Culture - Blood (collected 26 Apr 2020 00:56)  Source: .Blood None  Preliminary Report (27 Apr 2020 10:02):    No growth to date.    Culture - Sputum (collected 25 Apr 2020 01:00)  Source: .Sputum Sputum  trap  Gram Stain (26 Apr 2020 12:29):    Moderate polymorphonuclear leukocytes per low power field    No Squamous epithelial cells per low power field    Rare Gram Positive Rods per oil power field    Rare Gram Positive Cocci in Pairs and Chains per oil power field  Final Report (28 Apr 2020 10:53):    Few Pseudomonas aeruginosa (Carbapenem Resistant)    Moderate Methicillin resistant Staphylococcus aureus    Normal Respiratory Faith present  Organism: Pseudomonas aeruginosa (Carbapenem Resistant)  Methicillin resistant Staphylococcus aureus (28 Apr 2020 10:44)  Organism: Pseudomonas aeruginosa (Carbapenem Resistant) (28 Apr 2020 10:44)      -  Amikacin: S <=16      -  Aztreonam: R >16      -  Cefepime: R >16      -  Ceftazidime: R >16      -  Ciprofloxacin: S <=0.25      -  Gentamicin: S 4      -  Imipenem: R >8      -  Levofloxacin: S <=0.5      -  Meropenem: R >8      -  Piperacillin/Tazobactam: R >64      -  Tobramycin: S <=2      Method Type: LADY  Organism: Methicillin resistant Staphylococcus aureus (28 Apr 2020 10:40)      -  Amoxicillin/Clavulanic Acid: R >4/2      -  Ampicillin: R >8      -  Ampicillin/Sulbactam: R <=8/4      -  Cefazolin: R >16      -  Ceftriaxone: R >32      -  Ciprofloxacin: R >2      -  Clindamycin: R >4      -  Daptomycin: S 0.5      -  Erythromycin: R >4      -  Gentamicin: S <=1 Should not be used as monotherapy      -  Levofloxacin: R >4      -  Linezolid: S 4      -  Meropenem: R >8      -  Moxifloxacin(Aerobic): R >4      -  Oxacillin: R >2      -  Penicillin: R >8      -  RIF- Rifampin: S <=1 Should not be used as monotherapy      -  Tetra/Doxy: S 2      -  Trimethoprim/Sulfamethoxazole: S <=0.5/9.5      -  Vancomycin: S 2      Method Type: LADY    Culture - Urine (collected 21 Apr 2020 15:51)  Source: .Urine Clean Catch (Midstream)  Final Report (22 Apr 2020 17:03):    <10,000 CFU/mL Normal Urogenital Faith    Culture - Blood (collected 18 Apr 2020 19:43)  Source: .Blood Blood-Peripheral  Final Report (24 Apr 2020 01:01):    No Growth Final    Culture - Blood (05.18.20 @ 11:44)    -  Staphylococcus aureus: Detec Any isolate of Staphylococcus aureus from a blood culture is NOT considered a contaminant.    -  RIF- Rifampin: S <=1 Should not be used as monotherapy    -  Penicillin: R >8    -  Oxacillin: S <=0.25    -  Tetra/Doxy: S <=1    -  Trimethoprim/Sulfamethoxazole: S <=0.5/9.5    -  Vancomycin: S 2    -  Clindamycin: R 0.5 This isolate is presumed to be clindamycin resistant based on detection of inducible resistance. Clindamycin may still be effective in some patients.    -  Gentamicin: S <=1 Should not be used as monotherapy    -  Erythromycin: R >4    -  Ampicillin/Sulbactam: S <=8/4    -  Cefazolin: S <=4    Gram Stain:   Growth in aerobic bottle: Gram Positive Cocci in Clusters  Growth in anaerobic bottle: Gram Positive Cocci in Clusters    Specimen Source: .Blood None    Organism: Staphylococcus aureus    Organism: Staphylococcus aureus    Culture Results:   Growth in aerobic and anaerobic bottles: Staphylococcus aureus    Organism Identification: Staphylococcus aureus  Staphylococcus aureus    Method Type: LADY    Method Type: PCR        Culture - Sputum . (05.18.20 @ 09:45)    -  Tobramycin: S <=2    -  Piperacillin/Tazobactam: R >64    -  Meropenem: R >8    -  Levofloxacin: S <=0.5    Gram Stain:   Numerous polymorphonuclear leukocytes per low power field  No Squamous epithelial cells per low power field  Numerous Gram Negative Rods per oil power field    -  Amikacin: S <=16    -  Cefepime: R >16    -  Aztreonam: R >16    -  Imipenem: R >8    -  Gentamicin: S 4    -  Ciprofloxacin: S <=0.25    -  Ceftazidime: R >16    Specimen Source: .Sputum Sputums trap    Culture Results:   Numerous Pseudomonas aeruginosa (Carbapenem Resistant)  Normal Respiratory Faith absent    Organism Identification: Pseudomonas aeruginosa (Carbapenem Resistant)    Organism: Pseudomonas aeruginosa (Carbapenem Resistant)    Method Type: LADY        COVID-19 PCR . (05.14.20 @ 08:10)    COVID-19 PCR: NotDetec    Radiology: all available radiological tests reviewed    < from: Xray Chest 1 View- PORTABLE-Urgent (05.18.20 @ 09:49) >  A tracheostomy tube is noted.  Lungs: There are no lung consolidations.  Heart: There is cardiomegaly.  Mediastinum: The mediastinum is within normal limits.    < end of copied text >    Advanced directives addressed: full resuscitation

## 2020-05-21 NOTE — PROGRESS NOTE ADULT - SUBJECTIVE AND OBJECTIVE BOX
Subjective:  Pt seen, w persistent MRSA sepsis and CRP in sputum--on IV vanco and Cipro.    Vital Signs:  Vital Signs Last 24 Hrs  T(C): 37.4 (05-21-20 @ 10:18), Max: 37.4 (05-21-20 @ 10:18)  T(F): 99.3 (05-21-20 @ 10:18), Max: 99.3 (05-21-20 @ 10:18)  HR: 123 (05-21-20 @ 17:00) (102 - 153)  BP: 123/74 (05-21-20 @ 17:00) (84/68 - 143/69)  RR: 26 (05-21-20 @ 17:00) (13 - 26)  SpO2: 100% (05-21-20 @ 17:00) (88% - 100%) on (O2)    Telemetry/Alarms:    Relevant labs, radiology and Medications reviewed                        10.0   5.43  )-----------( 169      ( 21 May 2020 04:52 )             30.4     05-21    143  |  107  |  7   ----------------------------<  93  3.5   |  28  |  0.39<L>    Ca    8.3<L>      21 May 2020 04:52  Phos  3.1     05-21  Mg     1.5     05-21        MEDICATIONS  (STANDING):  ALBUTerol    90 MICROgram(s) HFA Inhaler 2 Puff(s) Inhalation every 4 hours  ALPRAZolam 0.5 milliGRAM(s) Oral every 6 hours  budesonide 160 MICROgram(s)/formoterol 4.5 MICROgram(s) Inhaler 2 Puff(s) Inhalation two times a day  chlorhexidine 0.12% Liquid 15 milliLiter(s) Oral Mucosa every 12 hours  ciprofloxacin   IVPB      ciprofloxacin   IVPB 400 milliGRAM(s) IV Intermittent once  ciprofloxacin   IVPB 400 milliGRAM(s) IV Intermittent every 12 hours  diltiazem    Tablet 60 milliGRAM(s) Oral every 6 hours  enoxaparin Injectable 40 milliGRAM(s) SubCutaneous daily  gabapentin 400 milliGRAM(s) Oral three times a day  midodrine 10 milliGRAM(s) Oral every 8 hours  pantoprazole  Injectable 40 milliGRAM(s) IV Push daily  polyethylene glycol 3350 17 Gram(s) Oral daily  potassium phosphate / sodium phosphate powder 2 Packet(s) Oral two times a day  senna 2 Tablet(s) Oral at bedtime  thiamine 100 milliGRAM(s) Oral daily  tiotropium 18 MICROgram(s) Capsule 1 Capsule(s) Inhalation daily  vancomycin  IVPB 1000 milliGRAM(s) IV Intermittent every 12 hours    MEDICATIONS  (PRN):  acetaminophen   Tablet .. 650 milliGRAM(s) Oral every 6 hours PRN Temp greater or equal to 38.5C (101.3F)  metoclopramide Injectable 10 milliGRAM(s) IV Push every 8 hours PRN Vomiting      Physical exam  Gen on vent, agitated  Card tachy  Pulm BS b/l  Abd soft  Ext warm        I&O's Summary    20 May 2020 07:01  -  21 May 2020 07:00  --------------------------------------------------------  IN: 3170 mL / OUT: 2000 mL / NET: 1170 mL    21 May 2020 07:01  -  21 May 2020 17:50  --------------------------------------------------------  IN: 50 mL / OUT: 150 mL / NET: -100 mL        Assessment  65y Male  w/ PAST MEDICAL & SURGICAL HISTORY:  Chronic CHF  COPD without exacerbation  Atrial fibrillation  Presence of Watchman left atrial appendage closure device  Hypertension  GERD (gastroesophageal reflux disease)  Chronic obstructive pulmonary disease (COPD)  Anemia  Falls  Meningitis  Collapsed lung  Alcohol withdrawal  Emphysema of lung  Cirrhosis  CHF (congestive heart failure)  Poor historian  Alcohol abuse  Chronic atrial fibrillation  Unilateral amputation of lower extremity below knee  Presence of Watchman left atrial appendage closure device  S/P BKA (below knee amputation) unilateral, left  admitted with complaints of Patient is a 65y old  Male who presents with a chief complaint of Respiratory Failure (21 May 2020 16:50)  .  66 y/o M with history of hypercapnic respiratory failure Trach/PEG in 2015 and 2017,  now intubated from hypercapnic respiratory failure , COVID neg. S/P Trach and PEG 5/15. Now w Staph bacteremia and pseudomonas in sputum.     Trach and PEG care  Please do not put drain sponge under trach or PEG  may resume home RX ASA and plavix for Hx of CVA? when stable  ABX as per ID    Discussed with Cardiothoracic Team at AM rounds.

## 2020-05-21 NOTE — PROGRESS NOTE ADULT - ASSESSMENT
IMP:    64 y/o male w chronic AFib w Watchman device, HFpEF , COPD on home O2, HTN, ETOH use and pt of size admitted on 4/18 with COPD exacerbation--RRT called on 4/23 for AMS requiring intubation--extubated on 4/26 then re intubated on 4/29.  S/P Trach and PEG on 5/15 and has failed SBT.  Most recent active issues is persistent MRSA sepsis and CRP in sputum--on IV vanco and Cipro.  No central access

## 2020-05-21 NOTE — PROGRESS NOTE ADULT - ASSESSMENT
64 y/o male with h/o chronic A.Fib with Watchman device, CHF, COPD, HTN, obesity was admitted on 4/18 for worsening SOB. In ER he was found with rapid A.fib and was placed on NRB. He tested COVID-19 PCR negative x 3. Noted with hypercapnea and placed on BiPAP, then intubated and placed on ventilatory support. He was extubated on 4/26. He is reported with MDRO's in sputum c/s. He was treated with azithromycin from 4/20 to 4/25.     1. Febrile syndrome. Sepsis with MSSA. Right arm cellulitis/ phlebitis. Acute respiratory failure. 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 Allergy to PCN and cephalosporines. Possible COVID-19 syndrome.   -has recurrent fever  -has persistent bacteremia  -concerned about sepsis with MSSA ?source ?endocarditis  -encephalopathy   -cultures reviewed; repeat sputum c/s shows CRE-PSAE  -s/p cipro IV # 9  -s/p vancomycin 1 gm IV q12h # 10 days until 5/7  -respiratory care  -repeat BC x 2 noted  -on vancomycin 1 gm IV q12h # 3  -tolerating abx well so far; no side effects noted  -he had prior CRE-PSAE; new sputum culture shows PSAE again; CXR dose not show infiltrates  -add cipro 400 mg IV q12h  -vancomycin trough level is acceptable   -continue abx coverage  -consider cardiology evaluation for MYA  -monitor temps  -f/u CBC  -supportive care  2. Other issues:   -care per medicine

## 2020-05-21 NOTE — PROGRESS NOTE ADULT - ASSESSMENT
A/P: 65 male with acute respiratory failure from altered awareness and sedation  COPD  AFIB s/p watchman  CHF  HTN  ETOH abuse    Plan:   PULM: S/P Trach and PEG on 5/15.  Failed weaning again. Off Precedex.  Will decrease Xanax to 0.5 q6     Cardio: Continue ASA and Plavix     Renal: Cr. Stable    GI: Feeds, PPI    ID: MRSA in BC from 5/18 and again on 5/20,  PSA Sensitive to cipro--will start 400 q12h.  Patient may need a MYA--D/W ID    PSY: Thiamine for chronic ETOH abuse and thiamine deficiency folate    Lovenox DVT Prophylaxis    D/W the patients daughter on a daily basis

## 2020-05-21 NOTE — PROGRESS NOTE ADULT - SUBJECTIVE AND OBJECTIVE BOX
Subjective:    pat awake on vent, off percedex.    Home Medications:  albuterol 2.5 mg/3 mL (0.083%) inhalation solution: 3 milliliter(s) inhaled every 6 hours, As Needed -for shortness of breath and/or wheezing (19 Apr 2020 03:12)  gabapentin 400 mg oral capsule: 1 cap(s) orally 3 times a day (19 Apr 2020 03:12)  pantoprazole 40 mg oral granule, enteric coated: 40 milligram(s) orally once a day (19 Apr 2020 03:12)  Spiriva 18 mcg inhalation capsule: 1 cap(s) inhaled once a day (19 Apr 2020 03:12)  tamsulosin 0.4 mg oral capsule: 1 cap(s) orally once a day (at bedtime) (19 Apr 2020 03:12)  traZODone 50 mg oral tablet: 2 tab(s) orally once a day (at bedtime), As Needed (19 Apr 2020 03:12)    MEDICATIONS  (STANDING):  ALBUTerol    90 MICROgram(s) HFA Inhaler 2 Puff(s) Inhalation every 4 hours  ALPRAZolam 0.5 milliGRAM(s) Oral every 6 hours  budesonide 160 MICROgram(s)/formoterol 4.5 MICROgram(s) Inhaler 2 Puff(s) Inhalation two times a day  chlorhexidine 0.12% Liquid 15 milliLiter(s) Oral Mucosa every 12 hours  ciprofloxacin   IVPB      ciprofloxacin   IVPB 400 milliGRAM(s) IV Intermittent once  ciprofloxacin   IVPB 400 milliGRAM(s) IV Intermittent every 12 hours  diltiazem    Tablet 60 milliGRAM(s) Oral every 6 hours  enoxaparin Injectable 40 milliGRAM(s) SubCutaneous daily  gabapentin 400 milliGRAM(s) Oral three times a day  midodrine 10 milliGRAM(s) Oral every 8 hours  pantoprazole  Injectable 40 milliGRAM(s) IV Push daily  polyethylene glycol 3350 17 Gram(s) Oral daily  potassium phosphate / sodium phosphate powder 2 Packet(s) Oral two times a day  senna 2 Tablet(s) Oral at bedtime  thiamine 100 milliGRAM(s) Oral daily  tiotropium 18 MICROgram(s) Capsule 1 Capsule(s) Inhalation daily  vancomycin  IVPB 1000 milliGRAM(s) IV Intermittent every 12 hours    MEDICATIONS  (PRN):  acetaminophen   Tablet .. 650 milliGRAM(s) Oral every 6 hours PRN Temp greater or equal to 38.5C (101.3F)  metoclopramide Injectable 10 milliGRAM(s) IV Push every 8 hours PRN Vomiting      Allergies    Ceclor (Rash)  Duricef (Rash)    Intolerances        Vital Signs Last 24 Hrs  T(C): 37.4 (21 May 2020 10:18), Max: 37.7 (20 May 2020 16:00)  T(F): 99.3 (21 May 2020 10:18), Max: 99.8 (20 May 2020 16:00)  HR: 136 (21 May 2020 11:00) (102 - 153)  BP: 138/75 (21 May 2020 07:00) (84/68 - 143/69)  BP(mean): 89 (21 May 2020 07:00) (54 - 96)  RR: 19 (21 May 2020 11:00) (13 - 31)  SpO2: 93% (21 May 2020 11:00) (88% - 100%)  Mode: AC/ CMV (Assist Control/ Continuous Mandatory Ventilation)  RR (machine): 15  TV (machine): 450  FiO2: 40  PEEP: 5  ITime: 1  MAP: 14  PIP: 28      PHYSICAL EXAMINATION:    NECK:  Supple. No lymphadenopathy. Jugular venous pressure not elevated. Carotids equal.   HEART:   The cardiac impulse has a normal quality. Reg., Nl S1 and S2.  There are no murmurs, rubs or gallops noted  CHEST:  Chest crackles to auscultation. Normal respiratory effort.  ABDOMEN:  Soft and nontender.   EXTREMITIES:  There is no edema.       LABS:                        10.0   5.43  )-----------( 169      ( 21 May 2020 04:52 )             30.4     05-21    143  |  107  |  7   ----------------------------<  93  3.5   |  28  |  0.39<L>    Ca    8.3<L>      21 May 2020 04:52  Phos  3.1     05-21  Mg     1.5     05-21

## 2020-05-22 DIAGNOSIS — R78.81 BACTEREMIA: ICD-10-CM

## 2020-05-22 LAB
ANION GAP SERPL CALC-SCNC: 6 MMOL/L — SIGNIFICANT CHANGE UP (ref 5–17)
BUN SERPL-MCNC: 5 MG/DL — LOW (ref 7–23)
CALCIUM SERPL-MCNC: 8.1 MG/DL — LOW (ref 8.5–10.1)
CHLORIDE SERPL-SCNC: 106 MMOL/L — SIGNIFICANT CHANGE UP (ref 96–108)
CO2 SERPL-SCNC: 29 MMOL/L — SIGNIFICANT CHANGE UP (ref 22–31)
CREAT SERPL-MCNC: 0.33 MG/DL — LOW (ref 0.5–1.3)
CULTURE RESULTS: SIGNIFICANT CHANGE UP
GLUCOSE SERPL-MCNC: 121 MG/DL — HIGH (ref 70–99)
GRAM STN FLD: SIGNIFICANT CHANGE UP
HCT VFR BLD CALC: 28 % — LOW (ref 39–50)
HGB BLD-MCNC: 9.1 G/DL — LOW (ref 13–17)
MAGNESIUM SERPL-MCNC: 1.7 MG/DL — SIGNIFICANT CHANGE UP (ref 1.6–2.6)
MCHC RBC-ENTMCNC: 31.5 PG — SIGNIFICANT CHANGE UP (ref 27–34)
MCHC RBC-ENTMCNC: 32.5 GM/DL — SIGNIFICANT CHANGE UP (ref 32–36)
MCV RBC AUTO: 96.9 FL — SIGNIFICANT CHANGE UP (ref 80–100)
PHOSPHATE SERPL-MCNC: 3.6 MG/DL — SIGNIFICANT CHANGE UP (ref 2.5–4.5)
PLATELET # BLD AUTO: 147 K/UL — LOW (ref 150–400)
POTASSIUM SERPL-MCNC: 3.4 MMOL/L — LOW (ref 3.5–5.3)
POTASSIUM SERPL-SCNC: 3.4 MMOL/L — LOW (ref 3.5–5.3)
RBC # BLD: 2.89 M/UL — LOW (ref 4.2–5.8)
RBC # FLD: 13.2 % — SIGNIFICANT CHANGE UP (ref 10.3–14.5)
SODIUM SERPL-SCNC: 141 MMOL/L — SIGNIFICANT CHANGE UP (ref 135–145)
SPECIMEN SOURCE: SIGNIFICANT CHANGE UP
SPECIMEN SOURCE: SIGNIFICANT CHANGE UP
WBC # BLD: 4.73 K/UL — SIGNIFICANT CHANGE UP (ref 3.8–10.5)
WBC # FLD AUTO: 4.73 K/UL — SIGNIFICANT CHANGE UP (ref 3.8–10.5)

## 2020-05-22 PROCEDURE — 99233 SBSQ HOSP IP/OBS HIGH 50: CPT

## 2020-05-22 PROCEDURE — 99291 CRITICAL CARE FIRST HOUR: CPT

## 2020-05-22 PROCEDURE — 99232 SBSQ HOSP IP/OBS MODERATE 35: CPT

## 2020-05-22 RX ORDER — POTASSIUM CHLORIDE 20 MEQ
40 PACKET (EA) ORAL ONCE
Refills: 0 | Status: COMPLETED | OUTPATIENT
Start: 2020-05-22 | End: 2020-05-22

## 2020-05-22 RX ORDER — APIXABAN 2.5 MG/1
5 TABLET, FILM COATED ORAL EVERY 12 HOURS
Refills: 0 | Status: DISCONTINUED | OUTPATIENT
Start: 2020-05-22 | End: 2020-05-26

## 2020-05-22 RX ORDER — CEFAZOLIN SODIUM 1 G
2000 VIAL (EA) INJECTION EVERY 8 HOURS
Refills: 0 | Status: DISCONTINUED | OUTPATIENT
Start: 2020-05-22 | End: 2020-06-05

## 2020-05-22 RX ORDER — DOXAZOSIN MESYLATE 4 MG
2 TABLET ORAL AT BEDTIME
Refills: 0 | Status: DISCONTINUED | OUTPATIENT
Start: 2020-05-22 | End: 2020-07-08

## 2020-05-22 RX ORDER — ASPIRIN/CALCIUM CARB/MAGNESIUM 324 MG
81 TABLET ORAL DAILY
Refills: 0 | Status: DISCONTINUED | OUTPATIENT
Start: 2020-05-22 | End: 2020-07-08

## 2020-05-22 RX ADMIN — Medication 100 MILLIGRAM(S): at 11:56

## 2020-05-22 RX ADMIN — Medication 0.5 MILLIGRAM(S): at 06:08

## 2020-05-22 RX ADMIN — ALBUTEROL 2 PUFF(S): 90 AEROSOL, METERED ORAL at 21:10

## 2020-05-22 RX ADMIN — CHLORHEXIDINE GLUCONATE 15 MILLILITER(S): 213 SOLUTION TOPICAL at 06:07

## 2020-05-22 RX ADMIN — Medication 2 MILLIGRAM(S): at 23:08

## 2020-05-22 RX ADMIN — CHLORHEXIDINE GLUCONATE 15 MILLILITER(S): 213 SOLUTION TOPICAL at 17:10

## 2020-05-22 RX ADMIN — Medication 0.5 MILLIGRAM(S): at 11:56

## 2020-05-22 RX ADMIN — GABAPENTIN 400 MILLIGRAM(S): 400 CAPSULE ORAL at 13:12

## 2020-05-22 RX ADMIN — Medication 81 MILLIGRAM(S): at 11:57

## 2020-05-22 RX ADMIN — Medication 60 MILLIGRAM(S): at 06:08

## 2020-05-22 RX ADMIN — BUDESONIDE AND FORMOTEROL FUMARATE DIHYDRATE 2 PUFF(S): 160; 4.5 AEROSOL RESPIRATORY (INHALATION) at 21:09

## 2020-05-22 RX ADMIN — Medication 100 MILLIGRAM(S): at 13:13

## 2020-05-22 RX ADMIN — BUDESONIDE AND FORMOTEROL FUMARATE DIHYDRATE 2 PUFF(S): 160; 4.5 AEROSOL RESPIRATORY (INHALATION) at 08:57

## 2020-05-22 RX ADMIN — Medication 60 MILLIGRAM(S): at 00:26

## 2020-05-22 RX ADMIN — ALBUTEROL 2 PUFF(S): 90 AEROSOL, METERED ORAL at 08:56

## 2020-05-22 RX ADMIN — Medication 0.5 MILLIGRAM(S): at 17:09

## 2020-05-22 RX ADMIN — MIDODRINE HYDROCHLORIDE 10 MILLIGRAM(S): 2.5 TABLET ORAL at 06:08

## 2020-05-22 RX ADMIN — Medication 200 MILLIGRAM(S): at 05:11

## 2020-05-22 RX ADMIN — Medication 100 MILLIGRAM(S): at 23:08

## 2020-05-22 RX ADMIN — Medication 60 MILLIGRAM(S): at 23:08

## 2020-05-22 RX ADMIN — Medication 2 PACKET(S): at 06:08

## 2020-05-22 RX ADMIN — Medication 60 MILLIGRAM(S): at 11:56

## 2020-05-22 RX ADMIN — PANTOPRAZOLE SODIUM 40 MILLIGRAM(S): 20 TABLET, DELAYED RELEASE ORAL at 11:56

## 2020-05-22 RX ADMIN — APIXABAN 5 MILLIGRAM(S): 2.5 TABLET, FILM COATED ORAL at 17:09

## 2020-05-22 RX ADMIN — GABAPENTIN 400 MILLIGRAM(S): 400 CAPSULE ORAL at 06:08

## 2020-05-22 RX ADMIN — Medication 0.5 MILLIGRAM(S): at 00:25

## 2020-05-22 RX ADMIN — TIOTROPIUM BROMIDE 1 CAPSULE(S): 18 CAPSULE ORAL; RESPIRATORY (INHALATION) at 09:07

## 2020-05-22 RX ADMIN — Medication 250 MILLIGRAM(S): at 06:07

## 2020-05-22 RX ADMIN — Medication 0.5 MILLIGRAM(S): at 23:08

## 2020-05-22 RX ADMIN — GABAPENTIN 400 MILLIGRAM(S): 400 CAPSULE ORAL at 23:09

## 2020-05-22 RX ADMIN — Medication 200 MILLIGRAM(S): at 17:08

## 2020-05-22 RX ADMIN — Medication 40 MILLIEQUIVALENT(S): at 10:02

## 2020-05-22 NOTE — PROGRESS NOTE ADULT - SUBJECTIVE AND OBJECTIVE BOX
Subjective:    pat on vent with ps 12/peep5, tachpneic, -300, awake.    Home Medications:  albuterol 2.5 mg/3 mL (0.083%) inhalation solution: 3 milliliter(s) inhaled every 6 hours, As Needed -for shortness of breath and/or wheezing (19 Apr 2020 03:12)  gabapentin 400 mg oral capsule: 1 cap(s) orally 3 times a day (19 Apr 2020 03:12)  pantoprazole 40 mg oral granule, enteric coated: 40 milligram(s) orally once a day (19 Apr 2020 03:12)  Spiriva 18 mcg inhalation capsule: 1 cap(s) inhaled once a day (19 Apr 2020 03:12)  tamsulosin 0.4 mg oral capsule: 1 cap(s) orally once a day (at bedtime) (19 Apr 2020 03:12)  traZODone 50 mg oral tablet: 2 tab(s) orally once a day (at bedtime), As Needed (19 Apr 2020 03:12)    MEDICATIONS  (STANDING):  ALBUTerol    90 MICROgram(s) HFA Inhaler 2 Puff(s) Inhalation every 4 hours  ALPRAZolam 0.5 milliGRAM(s) Oral every 6 hours  apixaban 5 milliGRAM(s) Oral every 12 hours  aspirin  chewable 81 milliGRAM(s) Oral daily  budesonide 160 MICROgram(s)/formoterol 4.5 MICROgram(s) Inhaler 2 Puff(s) Inhalation two times a day  ceFAZolin   IVPB 2000 milliGRAM(s) IV Intermittent every 8 hours  chlorhexidine 0.12% Liquid 15 milliLiter(s) Oral Mucosa every 12 hours  ciprofloxacin   IVPB      ciprofloxacin   IVPB 400 milliGRAM(s) IV Intermittent every 12 hours  diltiazem    Tablet 60 milliGRAM(s) Oral every 6 hours  doxazosin 2 milliGRAM(s) Oral at bedtime  gabapentin 400 milliGRAM(s) Oral three times a day  pantoprazole  Injectable 40 milliGRAM(s) IV Push daily  polyethylene glycol 3350 17 Gram(s) Oral daily  thiamine 100 milliGRAM(s) Oral daily  tiotropium 18 MICROgram(s) Capsule 1 Capsule(s) Inhalation daily    MEDICATIONS  (PRN):  acetaminophen   Tablet .. 650 milliGRAM(s) Oral every 6 hours PRN Temp greater or equal to 38.5C (101.3F)  metoclopramide Injectable 10 milliGRAM(s) IV Push every 8 hours PRN Vomiting      Allergies    Ceclor (Rash)  Duricef (Rash)    Intolerances        Vital Signs Last 24 Hrs  T(C): 37.3 (22 May 2020 10:00), Max: 37.6 (21 May 2020 22:00)  T(F): 99.2 (22 May 2020 10:00), Max: 99.7 (21 May 2020 22:00)  HR: 135 (22 May 2020 11:00) (104 - 135)  BP: 134/62 (22 May 2020 11:00) (104/63 - 148/78)  BP(mean): 74 (22 May 2020 11:00) (69 - 93)  RR: 27 (22 May 2020 11:00) (10 - 30)  SpO2: 99% (22 May 2020 11:00) (96% - 100%)  Mode: PS (Pressure Support)/ Spontaneous  FiO2: 40  PEEP: 5  MAP: 9.6  PC: 12  PIP: 18      PHYSICAL EXAMINATION:    NECK:  Supple. No lymphadenopathy. Jugular venous pressure not elevated. Carotids equal.   HEART:   The cardiac impulse has a normal quality. Reg., Nl S1 and S2.  There are no murmurs, rubs or gallops noted  CHEST:  Chest crackles to auscultation. Normal respiratory effort.  ABDOMEN:  Soft and nontender.   EXTREMITIES:  There is no edema.       LABS:                        9.1    4.73  )-----------( 147      ( 22 May 2020 06:17 )             28.0     05-22    141  |  106  |  5<L>  ----------------------------<  121<H>  3.4<L>   |  29  |  0.33<L>    Ca    8.1<L>      22 May 2020 06:17  Phos  3.6     05-22  Mg     1.7     05-22

## 2020-05-22 NOTE — CHART NOTE - NSCHARTNOTEFT_GEN_A_CORE
Spoke with Litzy via phone--[phone].  All concerns addressed including but not limited to diagnosis, treatment plan and overall prognosis.  ?? MYA on Tuesday--will further discuss

## 2020-05-22 NOTE — PROGRESS NOTE ADULT - ASSESSMENT
66 y/o male with h/o chronic A.Fib with Watchman device, CHF, COPD, HTN, obesity was admitted on 4/18 for worsening SOB. In ER he was found with rapid A.fib and was placed on NRB. He tested COVID-19 PCR negative x 3. Noted with hypercapnea and placed on BiPAP, then intubated and placed on ventilatory support. He was extubated on 4/26. He is reported with MDRO's in sputum c/s. He was treated with azithromycin from 4/20 to 4/25.     1. Febrile syndrome. Sepsis with MSSA. Right arm cellulitis/ phlebitis. Acute respiratory failure. 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 Allergy to PCN and cephalosporines. Possible COVID-19 syndrome.   -has recurrent fever  -has persistent bacteremia  -concerned about sepsis with MSSA ?source ?endocarditis  -encephalopathy   -cultures reviewed; repeat sputum c/s shows CRE-PSAE  -s/p cipro IV # 9  -s/p vancomycin 1 gm IV q12h # 10 days until 5/7  -respiratory care  -repeat BC x 2 noted  -on vancomycin 1 gm IV q12h # 4 and cipro IV # 2  -tolerating abx well so far; no side effects noted  -he had prior CRE-PSAE; new sputum culture shows PSAE again; CXR dose not show infiltrates  -has persistent bacteremia; reported with prior rash to duricef  -start cefazolin 2 gm IV q8h; benefits outweigh risks  -reason for abx use and side effects reviewed; monitor BMP  -monitor closely in tod of duricef allergy history  -continue abx coverage  -consider cardiology evaluation for MYA  -monitor temps  -f/u CBC  -supportive care  2. Other issues:   -care per medicine

## 2020-05-22 NOTE — PROGRESS NOTE ADULT - SUBJECTIVE AND OBJECTIVE BOX
[id number]  ICU # 28  Vent # 22 and Trach and PEG # 6    CC:  Respiratory Failure     HPI:    66 y/o male w chronic AFib w Watchman device, HFpEF , COPD on home O2, HTN, ETOH use and pt of size admitted on 4/18 with COPD exacerbation--RRT called on 4/23 for AMS requiring intubation--extubated on 4/26 then re intubated on 4/29.  S/P Trach and PEG on 5/15 and has failed SBT.  Most recent active issues is persistent MRSA sepsis and CRP in sputum--on IV vanco and Cipro.  No central access      5/22:  Pt seen and examined in ICU--vented-- Encephalopathy.  still MRSA + in Bcx.      PMH:  As above.     PSH:  As above.     FH: Non Contributory other than those listed in HPI    Social History:  Unobtainable due to clinical condition     MEDICATIONS  (STANDING):  ALBUTerol    90 MICROgram(s) HFA Inhaler 2 Puff(s) Inhalation every 4 hours  ALPRAZolam 0.5 milliGRAM(s) Oral every 6 hours  apixaban 5 milliGRAM(s) Oral every 12 hours  aspirin  chewable 81 milliGRAM(s) Oral daily  budesonide 160 MICROgram(s)/formoterol 4.5 MICROgram(s) Inhaler 2 Puff(s) Inhalation two times a day  chlorhexidine 0.12% Liquid 15 milliLiter(s) Oral Mucosa every 12 hours  ciprofloxacin   IVPB      ciprofloxacin   IVPB 400 milliGRAM(s) IV Intermittent every 12 hours  diltiazem    Tablet 60 milliGRAM(s) Oral every 6 hours  doxazosin 2 milliGRAM(s) Oral at bedtime  gabapentin 400 milliGRAM(s) Oral three times a day  pantoprazole  Injectable 40 milliGRAM(s) IV Push daily  polyethylene glycol 3350 17 Gram(s) Oral daily  potassium chloride   Powder 40 milliEquivalent(s) Oral once  thiamine 100 milliGRAM(s) Oral daily  tiotropium 18 MICROgram(s) Capsule 1 Capsule(s) Inhalation daily  vancomycin  IVPB 1000 milliGRAM(s) IV Intermittent every 12 hours    MEDICATIONS  (PRN):  acetaminophen   Tablet .. 650 milliGRAM(s) Oral every 6 hours PRN Temp greater or equal to 38.5C (101.3F)  metoclopramide Injectable 10 milliGRAM(s) IV Push every 8 hours PRN Vomiting      Allergies: NKDA    ROS:  SEE BELOW        ICU Vital Signs Last 24 Hrs  T(C): 37.6 (22 May 2020 06:00), Max: 37.6 (21 May 2020 22:00)  T(F): 99.7 (22 May 2020 06:00), Max: 99.7 (21 May 2020 22:00)  HR: 115 (22 May 2020 08:50) (104 - 136)  BP: 148/78 (22 May 2020 08:00) (104/63 - 148/78)  BP(mean): 93 (22 May 2020 08:00) (69 - 128)  ABP: --  ABP(mean): --  RR: 16 (22 May 2020 08:00) (10 - 27)  SpO2: 97% (22 May 2020 08:50) (93% - 100%)      Mode: PS (Pressure Support)/ Spontaneous  FiO2: 40  PEEP: 5  MAP: 9.6  PC: 12  PIP: 18      I&O's Summary    21 May 2020 07:01  -  22 May 2020 07:00  --------------------------------------------------------  IN: 1490 mL / OUT: 1450 mL / NET: 40 mL        Physical Exam:  SEE BELOW                          9.1    4.73  )-----------( 147      ( 22 May 2020 06:17 )             28.0       05-22    141  |  106  |  5<L>  ----------------------------<  121<H>  3.4<L>   |  29  |  0.33<L>    Ca    8.1<L>      22 May 2020 06:17  Phos  3.6     05-22  Mg     1.7     05-22                      DVT Prophylaxis:                                                            Contraindication:     Advanced Directives:    Discussed with:    Visit Information:  Time spent excluding procedure:  30 cc mins    ** Time is exclusive of billed procedures and/or teaching and/or routine family updates. Hospital # 33  ICU # 28  Vent # 22 and Trach and PEG # 6    CC:  Respiratory Failure     HPI:    66 y/o male w chronic AFib w Watchman device, HFpEF , COPD on home O2, HTN, ETOH use and pt of size admitted on 4/18 with COPD exacerbation--RRT called on 4/23 for AMS requiring intubation--extubated on 4/26 then re intubated on 4/29.  S/P Trach and PEG on 5/15 and has failed SBT.  Most recent active issues is persistent MSSA sepsis and CRP in sputum--on IV vanco and Cipro.  No central access      5/22:  Pt seen and examined in ICU--vented-- Encephalopathy.  still MRSA + in Bcx.      PMH:  As above.     PSH:  As above.     FH: Non Contributory other than those listed in HPI    Social History:  Unobtainable due to clinical condition     MEDICATIONS  (STANDING):  ALBUTerol    90 MICROgram(s) HFA Inhaler 2 Puff(s) Inhalation every 4 hours  ALPRAZolam 0.5 milliGRAM(s) Oral every 6 hours  apixaban 5 milliGRAM(s) Oral every 12 hours  aspirin  chewable 81 milliGRAM(s) Oral daily  budesonide 160 MICROgram(s)/formoterol 4.5 MICROgram(s) Inhaler 2 Puff(s) Inhalation two times a day  chlorhexidine 0.12% Liquid 15 milliLiter(s) Oral Mucosa every 12 hours  ciprofloxacin   IVPB      ciprofloxacin   IVPB 400 milliGRAM(s) IV Intermittent every 12 hours  diltiazem    Tablet 60 milliGRAM(s) Oral every 6 hours  doxazosin 2 milliGRAM(s) Oral at bedtime  gabapentin 400 milliGRAM(s) Oral three times a day  pantoprazole  Injectable 40 milliGRAM(s) IV Push daily  polyethylene glycol 3350 17 Gram(s) Oral daily  potassium chloride   Powder 40 milliEquivalent(s) Oral once  thiamine 100 milliGRAM(s) Oral daily  tiotropium 18 MICROgram(s) Capsule 1 Capsule(s) Inhalation daily  vancomycin  IVPB 1000 milliGRAM(s) IV Intermittent every 12 hours    MEDICATIONS  (PRN):  acetaminophen   Tablet .. 650 milliGRAM(s) Oral every 6 hours PRN Temp greater or equal to 38.5C (101.3F)  metoclopramide Injectable 10 milliGRAM(s) IV Push every 8 hours PRN Vomiting      Allergies: NKDA    ROS:  SEE BELOW        ICU Vital Signs Last 24 Hrs  T(C): 37.6 (22 May 2020 06:00), Max: 37.6 (21 May 2020 22:00)  T(F): 99.7 (22 May 2020 06:00), Max: 99.7 (21 May 2020 22:00)  HR: 115 (22 May 2020 08:50) (104 - 136)  BP: 148/78 (22 May 2020 08:00) (104/63 - 148/78)  BP(mean): 93 (22 May 2020 08:00) (69 - 128)  ABP: --  ABP(mean): --  RR: 16 (22 May 2020 08:00) (10 - 27)  SpO2: 97% (22 May 2020 08:50) (93% - 100%)      Mode: PS (Pressure Support)/ Spontaneous  FiO2: 40  PEEP: 5  MAP: 9.6  PC: 12  PIP: 18      I&O's Summary    21 May 2020 07:01  -  22 May 2020 07:00  --------------------------------------------------------  IN: 1490 mL / OUT: 1450 mL / NET: 40 mL        Physical Exam:  SEE BELOW                          9.1    4.73  )-----------( 147      ( 22 May 2020 06:17 )             28.0       05-22    141  |  106  |  5<L>  ----------------------------<  121<H>  3.4<L>   |  29  |  0.33<L>    Ca    8.1<L>      22 May 2020 06:17  Phos  3.6     05-22  Mg     1.7     05-22                      DVT Prophylaxis:                                                            Contraindication:     Advanced Directives:    Discussed with:    Visit Information:  Time spent excluding procedure:  30 cc mins    ** Time is exclusive of billed procedures and/or teaching and/or routine family updates.

## 2020-05-22 NOTE — PROGRESS NOTE ADULT - ASSESSMENT
IMP:    64 y/o male w chronic AFib w Watchman device, HFpEF , COPD on home O2, HTN, ETOH use and pt of size admitted on 4/18 with COPD exacerbation--RRT called on 4/23 for AMS requiring intubation--extubated on 4/26 then re intubated on 4/29.  S/P Trach and PEG on 5/15 and has failed SBT.  Most recent active issues is persistent MRSA sepsis and CRP in sputum--on IV vanco and Cipro.  No central access   Chronic resp failure  TM Encephalopathy     High risk for acute decompensation and deterioration including death     Plan:    SBT as tolerated  Cont with IV vanco (5) and Cipro (2)--will d/w ID   Restart apixaban and ASA for CAD and AFib  TF to goal  HOB > 30  Start cardura and DC blair  DVT prophy--AC    ICU care--d/w ICU staff on multi disciplinary rounds IMP:    64 y/o male w chronic AFib w Watchman device, HFpEF , COPD on home O2, HTN, ETOH use and pt of size admitted on 4/18 with COPD exacerbation--RRT called on 4/23 for AMS requiring intubation--extubated on 4/26 then re intubated on 4/29.  S/P Trach and PEG on 5/15 and has failed SBT.  Most recent active issues is persistent MSSA sepsis and CRP in sputum--on IV vanco and Cipro.  No central access   Chronic resp failure  TM Encephalopathy     High risk for acute decompensation and deterioration including death     Plan:    SBT as tolerated  Cont with IV vanco (5) and Cipro (2)--will d/w ID   Restart apixaban and ASA for CAD and AFib  TF to goal  HOB > 30  Start cardura and DC blair  DVT prophy--AC    ICU care--d/w ICU staff on multi disciplinary rounds

## 2020-05-22 NOTE — CHART NOTE - NSCHARTNOTEFT_GEN_A_CORE
Assessment:   *p s/p trach and PEG on 5/15.  Pt with encephalopathy, still MRSA (+).    *labs reviewed: hypokalemia noted with magnesium on lower end of normal.    *(+3) generalized and b/l hands/arm edema.    *(+) urine output: 1450mL.  loose BM with rectal tube in place on 5/22.  will add banatrol to aid with stopping liquid stool, d/w pharmacist, bowel regimen to be stopped.    *based on labs and nutr needs, rec'd change TF Rx to Jevity 1.5 @ 80mL/hr with 4pkts Prosource TF and 3pkts banatrol.  Which will provide ~2680Kcal, 146g protein, 1216mL free water, and total TF volume of 1600mL.  rec'd additional free water flushes of 55mL q1hr, which will provide ~1100mL    Recommendations:        Diet Prescription: Diet, NPO with Tube Feed:   Tube Feeding Modality: Nasogastric  Jevity 1.5 Randall (JEVITY1.5)  Total Volume for 24 Hours (mL): 1080  Continuous  Starting Tube Feed Rate {mL per Hour}: 45  Increase Tube Feed Rate by (mL): 0     Every hour  Until Goal Tube Feed Rate (mL per Hour): 45  Tube Feed Duration (in Hours): 24  Tube Feed Start Time: 00:00  No Carb Prosource TF     Qty per Day:  6 (05-11-20 @ 11:06)      Wt Hx:  117.1Kg (5/9)  117.9Kg (4/18)      05-21-20 @ 07:01  -  05-22-20 @ 07:00  --------------------------------------------------------  IN: 1490 mL / OUT: 1450 mL / NET: 40 mL        Estimated Needs:   2250-2700Kcal (25-30Kcal/Kg of adjust BW; 90Kg)  146-162g protein (1.8-2g/Kg IBW: 81Kg)  2250-2700mL (25-30mL/Kg of adjusted BW: 90Kg)        Pertinent Labs: 05-22 Na141 mmol/L Glu 121 mg/dL<H> K+ 3.4 mmol/L<L> Cr  0.33 mg/dL<L> BUN 5 mg/dL<L> 05-22 Phos 3.6 mg/dL 05-17 Alb 1.9 g/dL<L> 05-10 Chol --    LDL --    HDL --    Trig 106 mg/dL    Skin: karla score = 12  skin tear on Rt hand  SDTI on Rt buttock  stage 2 PU on Lt buttocks      Monitoring and Evaluation:   [x] PO intake/Nutr support infusion [ x ] Tolerance to Nutr [ x ] weights [ x ] labs[ x ] follow up per protocol  [ ] other: Assessment:   *p s/p trach and PEG on 5/15.  Pt with encephalopathy, still MRSA (+).    *labs reviewed: hypokalemia noted with magnesium on lower end of normal.    *(+3) generalized and b/l hands/arm edema.    *(+) urine output: 1450mL.  loose BM with rectal tube in place on 5/22.  will add banatrol to aid with stopping liquid stool, d/w pharmacist, bowel regimen to be stopped.    *based on labs and nutr needs, rec'd change TF Rx to Jevity 1.5 @ 80mL/hr with 4pkts Prosource TF and 3pkts banatrol.  Which will provide ~2680Kcal, 146g protein, 1216mL free water, and total TF volume of 1600mL.  rec'd additional free water flushes of 55mL q1hr, which will provide ~1100mL additional free water; total free water of ~2316mL.    Recommendations:  1) change TF Rx to Jevity 1.5 @ 80mL/hr with 4pkts Prosource TF and 3pkts banatrol  2) monitor hydration status; consider additional free water flushes of 55mL q1hr (=1100mL additional free water)  3) monitor TF tolerance; keep back of bed > 35 degrees  4) monitor BM; adjust bowel regimen prn  5) obtain new wt when feasible      Diet Prescription: Diet, NPO with Tube Feed:   Tube Feeding Modality: Nasogastric  Jevity 1.5 Randall (JEVITY1.5)  Total Volume for 24 Hours (mL): 1080  Continuous  Starting Tube Feed Rate {mL per Hour}: 45  Increase Tube Feed Rate by (mL): 0     Every hour  Until Goal Tube Feed Rate (mL per Hour): 45  Tube Feed Duration (in Hours): 24  Tube Feed Start Time: 00:00  No Carb Prosource TF     Qty per Day:  6 (05-11-20 @ 11:06)      Wt Hx:  117.1Kg (5/9)  117.9Kg (4/18)      05-21-20 @ 07:01  -  05-22-20 @ 07:00  --------------------------------------------------------  IN: 1490 mL / OUT: 1450 mL / NET: 40 mL        Estimated Needs:   2250-2700Kcal (25-30Kcal/Kg of adjust BW; 90Kg)  146-162g protein (1.8-2g/Kg IBW: 81Kg)  2250-2700mL (25-30mL/Kg of adjusted BW: 90Kg)        Pertinent Labs: 05-22 Na141 mmol/L Glu 121 mg/dL<H> K+ 3.4 mmol/L<L> Cr  0.33 mg/dL<L> BUN 5 mg/dL<L> 05-22 Phos 3.6 mg/dL 05-17 Alb 1.9 g/dL<L> 05-10 Chol --    LDL --    HDL --    Trig 106 mg/dL    Skin: karla score = 12  skin tear on Rt hand  SDTI on Rt buttock  stage 2 PU on Lt buttocks      Monitoring and Evaluation:   [x] PO intake/Nutr support infusion [ x ] Tolerance to Nutr [ x ] weights [ x ] labs[ x ] follow up per protocol  [ ] other:

## 2020-05-22 NOTE — PROGRESS NOTE ADULT - ASSESSMENT
66 y/o M with history of hypercapnic respiratory failure Trach/PEG in 2015 and 2017,  now intubated from hypercapnic respiratory failure , COVID neg. S/P Trach and PEG 5/15. Now w Staph bacteremia and pseudomonas in sputum. 5/22 Started on Eliquis.     Trach and PEG care  Please do not put drain sponge under trach or PEG  ABX as per ID  Remove trach sutures Tuesday 5/26 POD#10    Discussed with Cardiothoracic Team at AM rounds.    Thu Veloz PA  Thoracic Sx #[id number]

## 2020-05-22 NOTE — PROGRESS NOTE ADULT - PROBLEM SELECTOR PLAN 2
Poor ventricular rate control; recommend increasing Cardizem dose (90mg q6hr); he has a history of past Watchman implant in [place].

## 2020-05-22 NOTE — PROVIDER CONTACT NOTE (CRITICAL VALUE NOTIFICATION) - TEST AND RESULT REPORTED:
5/20/2020 Blood Culture Amended Report : Growth in aerobic bottle staph aureaus; growth in anaerobic bottle gram (+) cocci in clusters

## 2020-05-22 NOTE — PROGRESS NOTE ADULT - ASSESSMENT
PROBLEMS;    New fever-staph bactermia/sputum pseudomonas/staph  Ac on chronic hypercapnic & hypoxamic respiratory failure-s/p trach & PEG  sputum-Few Pseudomonas aeruginosa (Carbapenem Resistant)/Moderate Methicillin resistant Staphylococcus aureus  afib with RVR  [facility]AY  AC flare of COPD/chronic vs acute on chronic bronchitis  Mild interstitial lung disease-NSIP h/o smoking  COVID negativex2  Pulmonary hypertension  Nonobstructing stone in the left ureterovesicular junction/ nonobstructing stone in the lower pole right kidney.  Subacute hemorrhage in the right rectus abdominis along the anterior sheath, measures 1.6 cm in thickness and extends from the umbilicus to the inferior myotendinous junction  Cirrhosis  Mild splenomegaly-portal venous hypertension.  Chronic afib w Watchman device  CHF  BPH  GERD  ETOH abuse  seizures disorder    PLAN;    pulmonary slow recovery  vent support-pat multiple failed weaning attempts- s/p trach weaning as tolerated  iv ancef for bactermia/iv cipro for pseudomonas  supportive care  covid repeat testing-neg  d/w staff

## 2020-05-22 NOTE — PROGRESS NOTE ADULT - SUBJECTIVE AND OBJECTIVE BOX
Date of service: 05-22-20 @ 10:31    Intubated on ventilatory support  Has low grade fever  Lethargic    ROS: unobtainable    MEDICATIONS  (STANDING):  ALBUTerol    90 MICROgram(s) HFA Inhaler 2 Puff(s) Inhalation every 4 hours  ALPRAZolam 0.5 milliGRAM(s) Oral every 6 hours  apixaban 5 milliGRAM(s) Oral every 12 hours  aspirin  chewable 81 milliGRAM(s) Oral daily  budesonide 160 MICROgram(s)/formoterol 4.5 MICROgram(s) Inhaler 2 Puff(s) Inhalation two times a day  ceFAZolin   IVPB 2000 milliGRAM(s) IV Intermittent every 8 hours  chlorhexidine 0.12% Liquid 15 milliLiter(s) Oral Mucosa every 12 hours  ciprofloxacin   IVPB      ciprofloxacin   IVPB 400 milliGRAM(s) IV Intermittent every 12 hours  diltiazem    Tablet 60 milliGRAM(s) Oral every 6 hours  doxazosin 2 milliGRAM(s) Oral at bedtime  gabapentin 400 milliGRAM(s) Oral three times a day  pantoprazole  Injectable 40 milliGRAM(s) IV Push daily  polyethylene glycol 3350 17 Gram(s) Oral daily  thiamine 100 milliGRAM(s) Oral daily  tiotropium 18 MICROgram(s) Capsule 1 Capsule(s) Inhalation daily      Vital Signs Last 24 Hrs  T(C): 37.3 (22 May 2020 10:00), Max: 37.6 (21 May 2020 22:00)  T(F): 99.2 (22 May 2020 10:00), Max: 99.7 (21 May 2020 22:00)  HR: 124 (22 May 2020 10:00) (104 - 136)  BP: 130/72 (22 May 2020 10:00) (104/63 - 148/78)  BP(mean): 80 (22 May 2020 10:00) (69 - 93)  RR: 30 (22 May 2020 10:00) (10 - 30)  SpO2: 98% (22 May 2020 10:00) (93% - 100%)    Mode: PS (Pressure Support)/ Spontaneous, FiO2: 40, PEEP: 5, MAP: 9.6, PC: 12, PIP: 18    Physical exam:    Constitutional:  No acute distress  HEENT: NC/AT, EOMI, PERRLA, conjunctivae clear  Neck: supple; thyroid not palpable  Back: no tenderness  Respiratory: respiratory effort normal; b/l rhonchi  Cardiovascular: S1S2 regular, no murmurs  Abdomen: soft, not tender, not distended, positive BS  Genitourinary: no suprapubic tenderness  Lymphatic: no LN palpable  Musculoskeletal: no muscle tenderness, no joint swelling or tenderness  Extremities: no pedal edema  Right arm area of erythema with phlebitis - slightly   Neurological/ Psychiatric: confused; moving all extremities  Skin: no rashes; no palpable lesions    Labs: reviewed                        10.0   5.43  )-----------( 169      ( 21 May 2020 04:52 )             30.4     05-21    143  |  107  |  7   ----------------------------<  93  3.5   |  28  |  0.39<L>    Ca    8.3<L>      21 May 2020 04:52  Phos  3.1     05-21  Mg     1.5     05-21    Vancomycin Level, Trough: 9.8 ug/mL (05-21 @ 04:52)                                  12.5   8.31  )-----------( 197      ( 07 May 2020 08:15 )             37.6     05-07    137  |  100  |  33<H>  ----------------------------<  114<H>  4.9   |  31  |  0.83    Ca    8.5      07 May 2020 08:15  Phos  4.2     05-07  Mg     2.4     05-07      COVID-19 PCR . (04.26.20 @ 00:02)    COVID-19 PCR: NotDetec      Culture - Blood (collected 26 Apr 2020 01:01)  Source: .Blood None  Preliminary Report (27 Apr 2020 10:02):    No growth to date.    Culture - Blood (collected 26 Apr 2020 00:56)  Source: .Blood None  Preliminary Report (27 Apr 2020 10:02):    No growth to date.    Culture - Sputum (collected 25 Apr 2020 01:00)  Source: .Sputum Sputum  trap  Gram Stain (26 Apr 2020 12:29):    Moderate polymorphonuclear leukocytes per low power field    No Squamous epithelial cells per low power field    Rare Gram Positive Rods per oil power field    Rare Gram Positive Cocci in Pairs and Chains per oil power field  Final Report (28 Apr 2020 10:53):    Few Pseudomonas aeruginosa (Carbapenem Resistant)    Moderate Methicillin resistant Staphylococcus aureus    Normal Respiratory Faith present  Organism: Pseudomonas aeruginosa (Carbapenem Resistant)  Methicillin resistant Staphylococcus aureus (28 Apr 2020 10:44)  Organism: Pseudomonas aeruginosa (Carbapenem Resistant) (28 Apr 2020 10:44)      -  Amikacin: S <=16      -  Aztreonam: R >16      -  Cefepime: R >16      -  Ceftazidime: R >16      -  Ciprofloxacin: S <=0.25      -  Gentamicin: S 4      -  Imipenem: R >8      -  Levofloxacin: S <=0.5      -  Meropenem: R >8      -  Piperacillin/Tazobactam: R >64      -  Tobramycin: S <=2      Method Type: LADY  Organism: Methicillin resistant Staphylococcus aureus (28 Apr 2020 10:40)      -  Amoxicillin/Clavulanic Acid: R >4/2      -  Ampicillin: R >8      -  Ampicillin/Sulbactam: R <=8/4      -  Cefazolin: R >16      -  Ceftriaxone: R >32      -  Ciprofloxacin: R >2      -  Clindamycin: R >4      -  Daptomycin: S 0.5      -  Erythromycin: R >4      -  Gentamicin: S <=1 Should not be used as monotherapy      -  Levofloxacin: R >4      -  Linezolid: S 4      -  Meropenem: R >8      -  Moxifloxacin(Aerobic): R >4      -  Oxacillin: R >2      -  Penicillin: R >8      -  RIF- Rifampin: S <=1 Should not be used as monotherapy      -  Tetra/Doxy: S 2      -  Trimethoprim/Sulfamethoxazole: S <=0.5/9.5      -  Vancomycin: S 2      Method Type: LADY    Culture - Urine (collected 21 Apr 2020 15:51)  Source: .Urine Clean Catch (Midstream)  Final Report (22 Apr 2020 17:03):    <10,000 CFU/mL Normal Urogenital Faith    Culture - Blood (collected 18 Apr 2020 19:43)  Source: .Blood Blood-Peripheral  Final Report (24 Apr 2020 01:01):    No Growth Final    Culture - Blood (05.18.20 @ 11:44)    -  Staphylococcus aureus: Detec Any isolate of Staphylococcus aureus from a blood culture is NOT considered a contaminant.    -  RIF- Rifampin: S <=1 Should not be used as monotherapy    -  Penicillin: R >8    -  Oxacillin: S <=0.25    -  Tetra/Doxy: S <=1    -  Trimethoprim/Sulfamethoxazole: S <=0.5/9.5    -  Vancomycin: S 2    -  Clindamycin: R 0.5 This isolate is presumed to be clindamycin resistant based on detection of inducible resistance. Clindamycin may still be effective in some patients.    -  Gentamicin: S <=1 Should not be used as monotherapy    -  Erythromycin: R >4    -  Ampicillin/Sulbactam: S <=8/4    -  Cefazolin: S <=4    Gram Stain:   Growth in aerobic bottle: Gram Positive Cocci in Clusters  Growth in anaerobic bottle: Gram Positive Cocci in Clusters    Specimen Source: .Blood None    Organism: Staphylococcus aureus    Organism: Staphylococcus aureus    Culture Results:   Growth in aerobic and anaerobic bottles: Staphylococcus aureus    Organism Identification: Staphylococcus aureus  Staphylococcus aureus    Method Type: LADY    Method Type: PCR        Culture - Sputum . (05.18.20 @ 09:45)    -  Tobramycin: S <=2    -  Piperacillin/Tazobactam: R >64    -  Meropenem: R >8    -  Levofloxacin: S <=0.5    Gram Stain:   Numerous polymorphonuclear leukocytes per low power field  No Squamous epithelial cells per low power field  Numerous Gram Negative Rods per oil power field    -  Amikacin: S <=16    -  Cefepime: R >16    -  Aztreonam: R >16    -  Imipenem: R >8    -  Gentamicin: S 4    -  Ciprofloxacin: S <=0.25    -  Ceftazidime: R >16    Specimen Source: .Sputum Sputums trap    Culture Results:   Numerous Pseudomonas aeruginosa (Carbapenem Resistant)  Normal Respiratory Faith absent    Organism Identification: Pseudomonas aeruginosa (Carbapenem Resistant)    Organism: Pseudomonas aeruginosa (Carbapenem Resistant)    Method Type: LADY        COVID-19 PCR . (05.14.20 @ 08:10)    COVID-19 PCR: NotDetec    Radiology: all available radiological tests reviewed    < from: Xray Chest 1 View- PORTABLE-Urgent (05.18.20 @ 09:49) >  A tracheostomy tube is noted.  Lungs: There are no lung consolidations.  Heart: There is cardiomegaly.  Mediastinum: The mediastinum is within normal limits.    < end of copied text >    Advanced directives addressed: full resuscitation

## 2020-05-22 NOTE — PROGRESS NOTE ADULT - SUBJECTIVE AND OBJECTIVE BOX
Subjective: Patient awake. Tolerated 3H CPAP, now back to AC with minimal vent settings. Remains staph bacteremic.     Vital Signs:  Vital Signs Last 24 Hrs  T(C): 37.3 (05-22-20 @ 10:00), Max: 37.6 (05-21-20 @ 22:00)  T(F): 99.2 (05-22-20 @ 10:00), Max: 99.7 (05-21-20 @ 22:00)  HR: 114 (05-22-20 @ 12:00) (104 - 135)  BP: 134/62 (05-22-20 @ 11:00) (104/63 - 148/78)  RR: 27 (05-22-20 @ 11:00) (10 - 30)  SpO2: 99% (05-22-20 @ 11:00) (96% - 100%) on (O2)      Relevant labs, radiology and Medications reviewed                        9.1    4.73  )-----------( 147      ( 22 May 2020 06:17 )             28.0     05-22    141  |  106  |  5<L>  ----------------------------<  121<H>  3.4<L>   |  29  |  0.33<L>    Ca    8.1<L>      22 May 2020 06:17  Phos  3.6     05-22  Mg     1.7     05-22        MEDICATIONS  (STANDING):  ALBUTerol    90 MICROgram(s) HFA Inhaler 2 Puff(s) Inhalation every 4 hours  ALPRAZolam 0.5 milliGRAM(s) Oral every 6 hours  apixaban 5 milliGRAM(s) Oral every 12 hours  aspirin  chewable 81 milliGRAM(s) Oral daily  budesonide 160 MICROgram(s)/formoterol 4.5 MICROgram(s) Inhaler 2 Puff(s) Inhalation two times a day  ceFAZolin   IVPB 2000 milliGRAM(s) IV Intermittent every 8 hours  chlorhexidine 0.12% Liquid 15 milliLiter(s) Oral Mucosa every 12 hours  ciprofloxacin   IVPB      ciprofloxacin   IVPB 400 milliGRAM(s) IV Intermittent every 12 hours  diltiazem    Tablet 60 milliGRAM(s) Oral every 6 hours  doxazosin 2 milliGRAM(s) Oral at bedtime  gabapentin 400 milliGRAM(s) Oral three times a day  pantoprazole  Injectable 40 milliGRAM(s) IV Push daily  polyethylene glycol 3350 17 Gram(s) Oral daily  thiamine 100 milliGRAM(s) Oral daily  tiotropium 18 MICROgram(s) Capsule 1 Capsule(s) Inhalation daily    MEDICATIONS  (PRN):  acetaminophen   Tablet .. 650 milliGRAM(s) Oral every 6 hours PRN Temp greater or equal to 38.5C (101.3F)  metoclopramide Injectable 10 milliGRAM(s) IV Push every 8 hours PRN Vomiting        Assessment  65y Male  w/ PAST MEDICAL & SURGICAL HISTORY:  Chronic CHF  COPD without exacerbation  Atrial fibrillation  Presence of Watchman left atrial appendage closure device  Hypertension  GERD (gastroesophageal reflux disease)  Chronic obstructive pulmonary disease (COPD)  Anemia  Falls  Meningitis  Collapsed lung  Alcohol withdrawal  Emphysema of lung  Cirrhosis  CHF (congestive heart failure)  Poor historian  Alcohol abuse  Chronic atrial fibrillation  Unilateral amputation of lower extremity below knee  Presence of Watchman left atrial appendage closure device  S/P BKA (below knee amputation) unilateral, left  admitted with complaints of Patient is a 65y old  Male who presents with a chief complaint of acute hypoxemic, hypercapneic resp failure  COPD exacerbation (22 May 2020 11:32)   patient underwent Bronchoscopy with creation of tracheostomy and insertion of percutaneous endoscopic gastrostomy (PEG) tube  Insertion, nasogastric tube

## 2020-05-22 NOTE — PROGRESS NOTE ADULT - SUBJECTIVE AND OBJECTIVE BOX
REASON FOR VISIT: ICU team requested a MYA    HPI:  65 year old man with a history of chronic AF, Watchman device (implanted 4/2019), HTN, CVAs, alcoholism, tobacco abuse, COPD, HFpEF, GERD, cirrhosis, left BKA following traumatic injury admitted on 4/18/2020 with  acute on chronic hypercapnic & hypoxemic respiratory failure with hospitalization complicated by encephalopathy, ventilator-dependence, and persistent MRSA bacteremia --critical care team and ID consultant request MYA to assess for vegetation.    5/22/2020:  Awake but encephalopathic.    MEDICATIONS  (STANDING):  ALBUTerol    90 MICROgram(s) HFA Inhaler 2 Puff(s) Inhalation every 4 hours  ALPRAZolam 0.5 milliGRAM(s) Oral every 6 hours  apixaban 5 milliGRAM(s) Oral every 12 hours  aspirin  chewable 81 milliGRAM(s) Oral daily  budesonide 160 MICROgram(s)/formoterol 4.5 MICROgram(s) Inhaler 2 Puff(s) Inhalation two times a day  ceFAZolin   IVPB 2000 milliGRAM(s) IV Intermittent every 8 hours  chlorhexidine 0.12% Liquid 15 milliLiter(s) Oral Mucosa every 12 hours  ciprofloxacin   IVPB 400 milliGRAM(s) IV Intermittent every 12 hours  diltiazem    Tablet 60 milliGRAM(s) Oral every 6 hours  doxazosin 2 milliGRAM(s) Oral at bedtime  gabapentin 400 milliGRAM(s) Oral three times a day  pantoprazole  Injectable 40 milliGRAM(s) IV Push daily  polyethylene glycol 3350 17 Gram(s) Oral daily  thiamine 100 milliGRAM(s) Oral daily  tiotropium 18 MICROgram(s) Capsule 1 Capsule(s) Inhalation daily    Vital Signs Last 24 Hrs  T(C): 37.3 (22 May 2020 10:00), Max: 37.6 (21 May 2020 22:00)  T(F): 99.2 (22 May 2020 10:00), Max: 99.7 (21 May 2020 22:00)  HR: 124 (22 May 2020 10:00) (104 - 130)  BP: 130/72 (22 May 2020 10:00) (104/63 - 148/78)  BP(mean): 80 (22 May 2020 10:00) (69 - 93)  RR: 30 (22 May 2020 10:00) (10 - 30)  SpO2: 98% (22 May 2020 10:00) (96% - 100%)    PHYSICAL EXAM:  Constitutional: Awake, looking around room but not clearly comprehending my speech  HEENT: Trach to vent  Respiratory: Lungs clear  Cardiovascular: Irregular, tachycardic  Gastrointestinal: Abdomen is soft, PEG   Extremities:  L BKA    LABS:                         9.1    4.73  )-----------( 147      ( 22 May 2020 06:17 )             28.0     141  |  106  |  5<L>  ----------------------------<  121<H>  3.4<L>   |  29  |  0.33<L>    Transthoracic Echocardiogram Follow Up (04.21.20 @ 09:10):   Moderate (2+) mitral regurgitation.   Moderate (2+) tricuspid valve regurgitation.   The left atrium is moderately dilated.   The left ventricle cavity is moderately dilated.   Normal left ventricular systolic function. Mild concentric left ventricular hypertrophy is present. Estimated left ventricular ejection fraction is 60-65 %.   The right atrium appears moderately dilated.   Normal appearing right ventricle structure and function.     TTE Echo Complete w/o contrast w/ Doppler (05.19.20 @ 11:14):   The left ventricle is normal in size, wall motion and contractility. Mild concentric left ventricular hypertrophy is present. Estimated left ventricular ejection fraction is 55-60 %.   The left atrium is moderately dilated.   The right atrium appears moderately dilated.   Normal appearing right ventricle structure and function.   Moderate (2+) mitral regurgitation is present.   Mild to moderate Tricuspid regurgitation is present.   Trace pulmonic valvular regurgitation is present.   No evidence of pericardial effusion.   Pleural effusion cannot be ruled out.    Tele: AF with RVR

## 2020-05-23 LAB
ANION GAP SERPL CALC-SCNC: 6 MMOL/L — SIGNIFICANT CHANGE UP (ref 5–17)
BUN SERPL-MCNC: 6 MG/DL — LOW (ref 7–23)
CALCIUM SERPL-MCNC: 8.2 MG/DL — LOW (ref 8.5–10.1)
CHLORIDE SERPL-SCNC: 107 MMOL/L — SIGNIFICANT CHANGE UP (ref 96–108)
CO2 SERPL-SCNC: 30 MMOL/L — SIGNIFICANT CHANGE UP (ref 22–31)
CREAT SERPL-MCNC: 0.52 MG/DL — SIGNIFICANT CHANGE UP (ref 0.5–1.3)
CULTURE RESULTS: SIGNIFICANT CHANGE UP
GLUCOSE SERPL-MCNC: 134 MG/DL — HIGH (ref 70–99)
HCT VFR BLD CALC: 27.2 % — LOW (ref 39–50)
HGB BLD-MCNC: 8.5 G/DL — LOW (ref 13–17)
MAGNESIUM SERPL-MCNC: 1.6 MG/DL — SIGNIFICANT CHANGE UP (ref 1.6–2.6)
MCHC RBC-ENTMCNC: 30.9 PG — SIGNIFICANT CHANGE UP (ref 27–34)
MCHC RBC-ENTMCNC: 31.3 GM/DL — LOW (ref 32–36)
MCV RBC AUTO: 98.9 FL — SIGNIFICANT CHANGE UP (ref 80–100)
PHOSPHATE SERPL-MCNC: 3.6 MG/DL — SIGNIFICANT CHANGE UP (ref 2.5–4.5)
PLATELET # BLD AUTO: 155 K/UL — SIGNIFICANT CHANGE UP (ref 150–400)
POTASSIUM SERPL-MCNC: 3.7 MMOL/L — SIGNIFICANT CHANGE UP (ref 3.5–5.3)
POTASSIUM SERPL-SCNC: 3.7 MMOL/L — SIGNIFICANT CHANGE UP (ref 3.5–5.3)
RBC # BLD: 2.75 M/UL — LOW (ref 4.2–5.8)
RBC # FLD: 13.2 % — SIGNIFICANT CHANGE UP (ref 10.3–14.5)
SODIUM SERPL-SCNC: 143 MMOL/L — SIGNIFICANT CHANGE UP (ref 135–145)
WBC # BLD: 5.26 K/UL — SIGNIFICANT CHANGE UP (ref 3.8–10.5)
WBC # FLD AUTO: 5.26 K/UL — SIGNIFICANT CHANGE UP (ref 3.8–10.5)

## 2020-05-23 PROCEDURE — 99233 SBSQ HOSP IP/OBS HIGH 50: CPT

## 2020-05-23 RX ORDER — ALPRAZOLAM 0.25 MG
0.5 TABLET ORAL EVERY 12 HOURS
Refills: 0 | Status: DISCONTINUED | OUTPATIENT
Start: 2020-05-23 | End: 2020-05-30

## 2020-05-23 RX ADMIN — Medication 81 MILLIGRAM(S): at 11:19

## 2020-05-23 RX ADMIN — GABAPENTIN 400 MILLIGRAM(S): 400 CAPSULE ORAL at 13:43

## 2020-05-23 RX ADMIN — Medication 100 MILLIGRAM(S): at 13:43

## 2020-05-23 RX ADMIN — Medication 0.5 MILLIGRAM(S): at 17:33

## 2020-05-23 RX ADMIN — Medication 100 MILLIGRAM(S): at 21:30

## 2020-05-23 RX ADMIN — CHLORHEXIDINE GLUCONATE 15 MILLILITER(S): 213 SOLUTION TOPICAL at 05:39

## 2020-05-23 RX ADMIN — Medication 100 MILLIGRAM(S): at 05:38

## 2020-05-23 RX ADMIN — GABAPENTIN 400 MILLIGRAM(S): 400 CAPSULE ORAL at 05:39

## 2020-05-23 RX ADMIN — Medication 60 MILLIGRAM(S): at 11:19

## 2020-05-23 RX ADMIN — Medication 60 MILLIGRAM(S): at 05:38

## 2020-05-23 RX ADMIN — PANTOPRAZOLE SODIUM 40 MILLIGRAM(S): 20 TABLET, DELAYED RELEASE ORAL at 11:19

## 2020-05-23 RX ADMIN — ALBUTEROL 2 PUFF(S): 90 AEROSOL, METERED ORAL at 00:22

## 2020-05-23 RX ADMIN — APIXABAN 5 MILLIGRAM(S): 2.5 TABLET, FILM COATED ORAL at 05:39

## 2020-05-23 RX ADMIN — Medication 200 MILLIGRAM(S): at 17:33

## 2020-05-23 RX ADMIN — Medication 2 MILLIGRAM(S): at 21:30

## 2020-05-23 RX ADMIN — APIXABAN 5 MILLIGRAM(S): 2.5 TABLET, FILM COATED ORAL at 17:33

## 2020-05-23 RX ADMIN — Medication 0.5 MILLIGRAM(S): at 05:39

## 2020-05-23 RX ADMIN — CHLORHEXIDINE GLUCONATE 15 MILLILITER(S): 213 SOLUTION TOPICAL at 17:32

## 2020-05-23 RX ADMIN — GABAPENTIN 400 MILLIGRAM(S): 400 CAPSULE ORAL at 21:30

## 2020-05-23 RX ADMIN — Medication 60 MILLIGRAM(S): at 23:18

## 2020-05-23 RX ADMIN — Medication 100 MILLIGRAM(S): at 11:19

## 2020-05-23 RX ADMIN — Medication 60 MILLIGRAM(S): at 17:32

## 2020-05-23 RX ADMIN — Medication 200 MILLIGRAM(S): at 06:43

## 2020-05-23 NOTE — PROGRESS NOTE ADULT - SUBJECTIVE AND OBJECTIVE BOX
Subjective:    pat on PS breathing comfortably, no new complaint.    Home Medications:  albuterol 2.5 mg/3 mL (0.083%) inhalation solution: 3 milliliter(s) inhaled every 6 hours, As Needed -for shortness of breath and/or wheezing (19 Apr 2020 03:12)  gabapentin 400 mg oral capsule: 1 cap(s) orally 3 times a day (19 Apr 2020 03:12)  pantoprazole 40 mg oral granule, enteric coated: 40 milligram(s) orally once a day (19 Apr 2020 03:12)  Spiriva 18 mcg inhalation capsule: 1 cap(s) inhaled once a day (19 Apr 2020 03:12)  tamsulosin 0.4 mg oral capsule: 1 cap(s) orally once a day (at bedtime) (19 Apr 2020 03:12)  traZODone 50 mg oral tablet: 2 tab(s) orally once a day (at bedtime), As Needed (19 Apr 2020 03:12)    MEDICATIONS  (STANDING):  ALBUTerol    90 MICROgram(s) HFA Inhaler 2 Puff(s) Inhalation every 4 hours  ALPRAZolam 0.5 milliGRAM(s) Oral every 12 hours  apixaban 5 milliGRAM(s) Oral every 12 hours  aspirin  chewable 81 milliGRAM(s) Oral daily  budesonide 160 MICROgram(s)/formoterol 4.5 MICROgram(s) Inhaler 2 Puff(s) Inhalation two times a day  ceFAZolin   IVPB 2000 milliGRAM(s) IV Intermittent every 8 hours  chlorhexidine 0.12% Liquid 15 milliLiter(s) Oral Mucosa every 12 hours  ciprofloxacin   IVPB      ciprofloxacin   IVPB 400 milliGRAM(s) IV Intermittent every 12 hours  diltiazem    Tablet 60 milliGRAM(s) Oral every 6 hours  doxazosin 2 milliGRAM(s) Oral at bedtime  gabapentin 400 milliGRAM(s) Oral three times a day  pantoprazole  Injectable 40 milliGRAM(s) IV Push daily  polyethylene glycol 3350 17 Gram(s) Oral daily  thiamine 100 milliGRAM(s) Oral daily  tiotropium 18 MICROgram(s) Capsule 1 Capsule(s) Inhalation daily    MEDICATIONS  (PRN):  acetaminophen   Tablet .. 650 milliGRAM(s) Oral every 6 hours PRN Temp greater or equal to 38.5C (101.3F)  metoclopramide Injectable 10 milliGRAM(s) IV Push every 8 hours PRN Vomiting      Allergies    Ceclor (Rash)  Duricef (Rash)    Intolerances        Vital Signs Last 24 Hrs  T(C): 37.2 (23 May 2020 10:00), Max: 37.6 (23 May 2020 06:00)  T(F): 99 (23 May 2020 10:00), Max: 99.7 (23 May 2020 06:00)  HR: 90 (23 May 2020 11:00) (69 - 138)  BP: 112/56 (23 May 2020 11:00) (98/56 - 126/75)  BP(mean): 67 (23 May 2020 11:00) (54 - 87)  RR: 17 (23 May 2020 11:00) (17 - 34)  SpO2: 100% (23 May 2020 11:00) (96% - 100%)  Mode: Spontaneous/ CPAP (Continuous Positive Airway Pressure)  FiO2: 35  PEEP: 5  PS: 12      PHYSICAL EXAMINATION:    NECK:  Supple. No lymphadenopathy. Jugular venous pressure not elevated. Carotids equal.   HEART:   The cardiac impulse has a normal quality. Reg., Nl S1 and S2.  There are no murmurs, rubs or gallops noted  CHEST:  Chest crackles to auscultation. Normal respiratory effort.  ABDOMEN:  Soft and nontender.   EXTREMITIES:  There is no edema.       LABS:                        8.5    5.26  )-----------( 155      ( 23 May 2020 07:07 )             27.2     05-23    143  |  107  |  6<L>  ----------------------------<  134<H>  3.7   |  30  |  0.52    Ca    8.2<L>      23 May 2020 07:07  Phos  3.6     05-23  Mg     1.6     05-23

## 2020-05-23 NOTE — PROGRESS NOTE ADULT - SUBJECTIVE AND OBJECTIVE BOX
REASON FOR VISIT: ICU team requested a MYA    HPI:  65 year old man with a history of chronic AF, Watchman device (implanted 4/2019), HTN, CVAs, alcoholism, tobacco abuse, COPD, HFpEF, GERD, cirrhosis, left BKA following traumatic injury admitted on 4/18/2020 with  acute on chronic hypercapnic & hypoxemic respiratory failure with hospitalization complicated by encephalopathy, ventilator-dependence, and persistent MRSA bacteremia --critical care team and ID consultant request MYA to assess for vegetation.    5/22/2020:  Awake but encephalopathic.  5/23/2020:  Nods that he understands me but seems to lose attention quickly; denies pain.    MEDICATIONS  (STANDING):  ALBUTerol    90 MICROgram(s) HFA Inhaler 2 Puff(s) Inhalation every 4 hours  ALPRAZolam 0.5 milliGRAM(s) Oral every 6 hours  apixaban 5 milliGRAM(s) Oral every 12 hours  aspirin  chewable 81 milliGRAM(s) Oral daily  budesonide 160 MICROgram(s)/formoterol 4.5 MICROgram(s) Inhaler 2 Puff(s) Inhalation two times a day  ceFAZolin   IVPB 2000 milliGRAM(s) IV Intermittent every 8 hours  chlorhexidine 0.12% Liquid 15 milliLiter(s) Oral Mucosa every 12 hours  ciprofloxacin   IVPB 400 milliGRAM(s) IV Intermittent every 12 hours  diltiazem    Tablet 60 milliGRAM(s) Oral every 6 hours  doxazosin 2 milliGRAM(s) Oral at bedtime  gabapentin 400 milliGRAM(s) Oral three times a day  pantoprazole  Injectable 40 milliGRAM(s) IV Push daily  polyethylene glycol 3350 17 Gram(s) Oral daily  thiamine 100 milliGRAM(s) Oral daily  tiotropium 18 MICROgram(s) Capsule 1 Capsule(s) Inhalation daily    MEDICATIONS  (PRN):  acetaminophen   Tablet .. 650 milliGRAM(s) Oral every 6 hours PRN Temp greater or equal to 38.5C (101.3F)  metoclopramide Injectable 10 milliGRAM(s) IV Push every 8 hours PRN Vomiting    Vital Signs Last 24 Hrs  T(C): 37.6 (23 May 2020 06:00), Max: 37.6 (23 May 2020 06:00)  T(F): 99.7 (23 May 2020 06:00), Max: 99.7 (23 May 2020 06:00)  HR: 106 (23 May 2020 08:00) (69 - 138)  BP: 120/72 (23 May 2020 08:00) (98/56 - 134/62)  BP(mean): 82 (23 May 2020 08:00) (54 - 87)  RR: 24 (23 May 2020 08:00) (18 - 30)  SpO2: 96% (23 May 2020 08:00) (96% - 100%)    PHYSICAL EXAM:  Constitutional: Awake, looking around room, no distress  HEENT: Trach to vent  Respiratory: Lungs clear  Cardiovascular: Irregular, tachycardic  Gastrointestinal: Abdomen is soft, PEG   Extremities:  L BKA; right pedal edema (minimal)    LABS:                          8.5    5.26  )-----------( 155      ( 23 May 2020 07:07 )             27.2     143  |  107  |  6<L>  ----------------------------<  134<H>  3.7   |  30  |  0.52    Transthoracic Echocardiogram Follow Up (04.21.20 @ 09:10):   Moderate (2+) mitral regurgitation.   Moderate (2+) tricuspid valve regurgitation.   The left atrium is moderately dilated.   The left ventricle cavity is moderately dilated.   Normal left ventricular systolic function. Mild concentric left ventricular hypertrophy is present. Estimated left ventricular ejection fraction is 60-65 %.   The right atrium appears moderately dilated.   Normal appearing right ventricle structure and function.     TTE Echo Complete w/o contrast w/ Doppler (05.19.20 @ 11:14):   The left ventricle is normal in size, wall motion and contractility. Mild concentric left ventricular hypertrophy is present. Estimated left ventricular ejection fraction is 55-60 %.   The left atrium is moderately dilated.   The right atrium appears moderately dilated.   Normal appearing right ventricle structure and function.   Moderate (2+) mitral regurgitation is present.   Mild to moderate Tricuspid regurgitation is present.   Trace pulmonic valvular regurgitation is present.   No evidence of pericardial effusion.   Pleural effusion cannot be ruled out.    Tele: AF    Culture - Blood in AM (05.20.20 @ 06:41):  Growth in aerobic bottle: Gram Positive Cocci in Clusters.   Staphylococcus aureus

## 2020-05-23 NOTE — PROGRESS NOTE ADULT - SUBJECTIVE AND OBJECTIVE BOX
[facility] 34  ICU # 29  Vent # 22 and Trach and PEG # 7    CC:  Respiratory Failure     HPI:    64 y/o male w chronic AFib w Watchman device, HFpEF , COPD on home O2, HTN, ETOH use and pt of size admitted on 4/18 with COPD exacerbation--RRT called on 4/23 for AMS requiring intubation--extubated on 4/26 then re intubated on 4/29.  S/P Trach and PEG on 5/15 and has failed SBT.  Most recent active issues is persistent MSSA sepsis and CRP in sputum--on IV vanco and Cipro.  No central access      5/22:  Pt seen and examined in ICU--vented-- Encephalopathy.  still MSSA + in Bcx  5/23:  Tolerated PSV 12 x 3 hrs on 5/22.  SBT ongoing now.  Poor mentation.  Repeat BCx sent.  Abx changed to Ancef.      PMH:  As above.     PSH:  As above.     FH: Non Contributory other than those listed in HPI    Social History:  Unobtainable due to clinical condition     MEDICATIONS  (STANDING):  ALBUTerol    90 MICROgram(s) HFA Inhaler 2 Puff(s) Inhalation every 4 hours  ALPRAZolam 0.5 milliGRAM(s) Oral every 12 hours  apixaban 5 milliGRAM(s) Oral every 12 hours  aspirin  chewable 81 milliGRAM(s) Oral daily  budesonide 160 MICROgram(s)/formoterol 4.5 MICROgram(s) Inhaler 2 Puff(s) Inhalation two times a day  ceFAZolin   IVPB 2000 milliGRAM(s) IV Intermittent every 8 hours  chlorhexidine 0.12% Liquid 15 milliLiter(s) Oral Mucosa every 12 hours  ciprofloxacin   IVPB      ciprofloxacin   IVPB 400 milliGRAM(s) IV Intermittent every 12 hours  diltiazem    Tablet 60 milliGRAM(s) Oral every 6 hours  doxazosin 2 milliGRAM(s) Oral at bedtime  gabapentin 400 milliGRAM(s) Oral three times a day  pantoprazole  Injectable 40 milliGRAM(s) IV Push daily  polyethylene glycol 3350 17 Gram(s) Oral daily  thiamine 100 milliGRAM(s) Oral daily  tiotropium 18 MICROgram(s) Capsule 1 Capsule(s) Inhalation daily    MEDICATIONS  (PRN):  acetaminophen   Tablet .. 650 milliGRAM(s) Oral every 6 hours PRN Temp greater or equal to 38.5C (101.3F)  metoclopramide Injectable 10 milliGRAM(s) IV Push every 8 hours PRN Vomiting      Allergies: NKDA    ROS:  SEE BELOW        ICU Vital Signs Last 24 Hrs  T(C): 37.6 (23 May 2020 06:00), Max: 37.6 (23 May 2020 06:00)  T(F): 99.7 (23 May 2020 06:00), Max: 99.7 (23 May 2020 06:00)  HR: 106 (23 May 2020 08:00) (69 - 138)  BP: 120/72 (23 May 2020 08:00) (98/56 - 134/62)  BP(mean): 82 (23 May 2020 08:00) (54 - 87)  ABP: --  ABP(mean): --  RR: 24 (23 May 2020 08:00) (18 - 30)  SpO2: 96% (23 May 2020 08:00) (96% - 100%)      Mode: AC/ CMV (Assist Control/ Continuous Mandatory Ventilation)  RR (machine): 15  TV (machine): 450  FiO2: 35  PEEP: 5  ITime: 1  MAP: 14  PIP: 43      I&O's Summary    22 May 2020 07:01  -  23 May 2020 07:00  --------------------------------------------------------  IN: 2890 mL / OUT: 2850 mL / NET: 40 mL        Physical Exam:  SEE BELOW                          8.5    5.26  )-----------( 155      ( 23 May 2020 07:07 )             27.2       05-23    143  |  107  |  6<L>  ----------------------------<  134<H>  3.7   |  30  |  0.52    Ca    8.2<L>      23 May 2020 07:07  Phos  3.6     05-23  Mg     1.6     05-23                      DVT Prophylaxis:                                                            Contraindication:     Advanced Directives:    Discussed with:    Visit Information:  Time spent excluding procedure:      ** Time is exclusive of billed procedures and/or teaching and/or routine family updates.

## 2020-05-23 NOTE — CHART NOTE - NSCHARTNOTEFT_GEN_A_CORE
Spoke with Litzy via phone--[phone].  All concerns addressed including but not limited to diagnosis, treatment plan and overall prognosis.  ?? MYA on Tuesday--will further discuss--Bcx sent off this morning

## 2020-05-23 NOTE — PROGRESS NOTE ADULT - ASSESSMENT
66 y/o male with h/o chronic A.Fib with Watchman device, CHF, COPD, HTN, obesity was admitted on 4/18 for worsening SOB. In ER he was found with rapid A.fib and was placed on NRB. He tested COVID-19 PCR negative x 3. Noted with hypercapnea and placed on BiPAP, then intubated and placed on ventilatory support. He was extubated on 4/26. He is reported with MDRO's in sputum c/s. He was treated with azithromycin from 4/20 to 4/25.     1. Febrile syndrome improving. Sepsis with MSSA. Right arm cellulitis/ phlebitis. Acute respiratory failure. 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 Allergy to PCN and cephalosporines. Possible COVID-19 syndrome.   -fever is down  -concerned about sepsis with MSSA ?source ?endocarditis  -encephalopathy   -cultures reviewed; repeat sputum c/s shows CRE-PSAE  -s/p cipro IV # 9  -s/p vancomycin 1 gm IV q12h # 10 days until 5/7  -respiratory care  -repeat BC x 2 collected this AM  -s/p vancomycin 1 gm IV q12h # 4 days  -on cefazolin 2 gm iV q8h # 2 and cipro IV # 2  -tolerating abx well so far; no side effects noted  -he had prior CRE-PSAE; new sputum culture shows PSAE again; CXR dose not show infiltrates  -f/u closely for rashes  -monitor closely in tod of duricef allergy history  -continue abx coverage  -cardiology evaluation appreciated; plan for MYA  -monitor temps  -f/u CBC  -supportive care  2. Other issues:   -care per medicine

## 2020-05-23 NOTE — PROGRESS NOTE ADULT - SUBJECTIVE AND OBJECTIVE BOX
Date of service: 05-23-20 @ 12:55    Lying in bed in NAD  Has low grade fever  Lethargic  No rashes reported    ROS: unobtainable    MEDICATIONS  (STANDING):  ALBUTerol    90 MICROgram(s) HFA Inhaler 2 Puff(s) Inhalation every 4 hours  ALPRAZolam 0.5 milliGRAM(s) Oral every 12 hours  apixaban 5 milliGRAM(s) Oral every 12 hours  aspirin  chewable 81 milliGRAM(s) Oral daily  budesonide 160 MICROgram(s)/formoterol 4.5 MICROgram(s) Inhaler 2 Puff(s) Inhalation two times a day  ceFAZolin   IVPB 2000 milliGRAM(s) IV Intermittent every 8 hours  chlorhexidine 0.12% Liquid 15 milliLiter(s) Oral Mucosa every 12 hours  ciprofloxacin   IVPB      ciprofloxacin   IVPB 400 milliGRAM(s) IV Intermittent every 12 hours  diltiazem    Tablet 60 milliGRAM(s) Oral every 6 hours  doxazosin 2 milliGRAM(s) Oral at bedtime  gabapentin 400 milliGRAM(s) Oral three times a day  pantoprazole  Injectable 40 milliGRAM(s) IV Push daily  polyethylene glycol 3350 17 Gram(s) Oral daily  thiamine 100 milliGRAM(s) Oral daily  tiotropium 18 MICROgram(s) Capsule 1 Capsule(s) Inhalation daily      Vital Signs Last 24 Hrs  T(C): 37.2 (23 May 2020 10:00), Max: 37.6 (23 May 2020 06:00)  T(F): 99 (23 May 2020 10:00), Max: 99.7 (23 May 2020 06:00)  HR: 98 (23 May 2020 12:00) (69 - 138)  BP: 133/74 (23 May 2020 12:00) (98/56 - 133/74)  BP(mean): 87 (23 May 2020 12:00) (54 - 87)  RR: 28 (23 May 2020 12:00) (17 - 34)  SpO2: 100% (23 May 2020 12:00) (96% - 100%)    Mode: Spontaneous/ CPAP (Continuous Positive Airway Pressure), FiO2: 35, PEEP: 5, PS: 12    Physical exam:    Constitutional:  No acute distress  HEENT: NC/AT, EOMI, PERRLA, conjunctivae clear  Neck: supple; thyroid not palpable  Back: no tenderness  Respiratory: respiratory effort normal; b/l rhonchi  Cardiovascular: S1S2 regular, no murmurs  Abdomen: soft, not tender, not distended, positive BS  Genitourinary: no suprapubic tenderness  Lymphatic: no LN palpable  Musculoskeletal: no muscle tenderness, no joint swelling or tenderness  Extremities: no pedal edema  Right arm area of erythema with phlebitis - resolving  Neurological/ Psychiatric: confused; moving all extremities  Skin: no rashes; no palpable lesions    Labs: reviewed                        8.5    5.26  )-----------( 155      ( 23 May 2020 07:07 )             27.2     05-23    143  |  107  |  6<L>  ----------------------------<  134<H>  3.7   |  30  |  0.52    Ca    8.2<L>      23 May 2020 07:07  Phos  3.6     05-23  Mg     1.6     05-23    Vancomycin Level, Trough: 9.8 ug/mL (05-21 @ 04:52)                                  12.5   8.31  )-----------( 197      ( 07 May 2020 08:15 )             37.6     05-07    137  |  100  |  33<H>  ----------------------------<  114<H>  4.9   |  31  |  0.83    Ca    8.5      07 May 2020 08:15  Phos  4.2     05-07  Mg     2.4     05-07      COVID-19 PCR . (04.26.20 @ 00:02)    COVID-19 PCR: NotDetec      Culture - Blood (collected 26 Apr 2020 01:01)  Source: .Blood None  Preliminary Report (27 Apr 2020 10:02):    No growth to date.    Culture - Blood (collected 26 Apr 2020 00:56)  Source: .Blood None  Preliminary Report (27 Apr 2020 10:02):    No growth to date.    Culture - Sputum (collected 25 Apr 2020 01:00)  Source: .Sputum Sputum  trap  Gram Stain (26 Apr 2020 12:29):    Moderate polymorphonuclear leukocytes per low power field    No Squamous epithelial cells per low power field    Rare Gram Positive Rods per oil power field    Rare Gram Positive Cocci in Pairs and Chains per oil power field  Final Report (28 Apr 2020 10:53):    Few Pseudomonas aeruginosa (Carbapenem Resistant)    Moderate Methicillin resistant Staphylococcus aureus    Normal Respiratory Faith present  Organism: Pseudomonas aeruginosa (Carbapenem Resistant)  Methicillin resistant Staphylococcus aureus (28 Apr 2020 10:44)  Organism: Pseudomonas aeruginosa (Carbapenem Resistant) (28 Apr 2020 10:44)      -  Amikacin: S <=16      -  Aztreonam: R >16      -  Cefepime: R >16      -  Ceftazidime: R >16      -  Ciprofloxacin: S <=0.25      -  Gentamicin: S 4      -  Imipenem: R >8      -  Levofloxacin: S <=0.5      -  Meropenem: R >8      -  Piperacillin/Tazobactam: R >64      -  Tobramycin: S <=2      Method Type: LADY  Organism: Methicillin resistant Staphylococcus aureus (28 Apr 2020 10:40)      -  Amoxicillin/Clavulanic Acid: R >4/2      -  Ampicillin: R >8      -  Ampicillin/Sulbactam: R <=8/4      -  Cefazolin: R >16      -  Ceftriaxone: R >32      -  Ciprofloxacin: R >2      -  Clindamycin: R >4      -  Daptomycin: S 0.5      -  Erythromycin: R >4      -  Gentamicin: S <=1 Should not be used as monotherapy      -  Levofloxacin: R >4      -  Linezolid: S 4      -  Meropenem: R >8      -  Moxifloxacin(Aerobic): R >4      -  Oxacillin: R >2      -  Penicillin: R >8      -  RIF- Rifampin: S <=1 Should not be used as monotherapy      -  Tetra/Doxy: S 2      -  Trimethoprim/Sulfamethoxazole: S <=0.5/9.5      -  Vancomycin: S 2      Method Type: LADY    Culture - Urine (collected 21 Apr 2020 15:51)  Source: .Urine Clean Catch (Midstream)  Final Report (22 Apr 2020 17:03):    <10,000 CFU/mL Normal Urogenital Faith    Culture - Blood (collected 18 Apr 2020 19:43)  Source: .Blood Blood-Peripheral  Final Report (24 Apr 2020 01:01):    No Growth Final    Culture - Blood (05.18.20 @ 11:44)    -  Staphylococcus aureus: Detec Any isolate of Staphylococcus aureus from a blood culture is NOT considered a contaminant.    -  RIF- Rifampin: S <=1 Should not be used as monotherapy    -  Penicillin: R >8    -  Oxacillin: S <=0.25    -  Tetra/Doxy: S <=1    -  Trimethoprim/Sulfamethoxazole: S <=0.5/9.5    -  Vancomycin: S 2    -  Clindamycin: R 0.5 This isolate is presumed to be clindamycin resistant based on detection of inducible resistance. Clindamycin may still be effective in some patients.    -  Gentamicin: S <=1 Should not be used as monotherapy    -  Erythromycin: R >4    -  Ampicillin/Sulbactam: S <=8/4    -  Cefazolin: S <=4    Gram Stain:   Growth in aerobic bottle: Gram Positive Cocci in Clusters  Growth in anaerobic bottle: Gram Positive Cocci in Clusters    Specimen Source: .Blood None    Organism: Staphylococcus aureus    Organism: Staphylococcus aureus    Culture Results:   Growth in aerobic and anaerobic bottles: Staphylococcus aureus    Organism Identification: Staphylococcus aureus  Staphylococcus aureus    Method Type: LADY    Method Type: PCR        Culture - Sputum . (05.18.20 @ 09:45)    -  Tobramycin: S <=2    -  Piperacillin/Tazobactam: R >64    -  Meropenem: R >8    -  Levofloxacin: S <=0.5    Gram Stain:   Numerous polymorphonuclear leukocytes per low power field  No Squamous epithelial cells per low power field  Numerous Gram Negative Rods per oil power field    -  Amikacin: S <=16    -  Cefepime: R >16    -  Aztreonam: R >16    -  Imipenem: R >8    -  Gentamicin: S 4    -  Ciprofloxacin: S <=0.25    -  Ceftazidime: R >16    Specimen Source: .Sputum Sputums trap    Culture Results:   Numerous Pseudomonas aeruginosa (Carbapenem Resistant)  Normal Respiratory Faith absent    Organism Identification: Pseudomonas aeruginosa (Carbapenem Resistant)    Organism: Pseudomonas aeruginosa (Carbapenem Resistant)    Method Type: LADY        COVID-19 PCR . (05.14.20 @ 08:10)    COVID-19 PCR: NotDetec    Radiology: all available radiological tests reviewed    < from: Xray Chest 1 View- PORTABLE-Urgent (05.18.20 @ 09:49) >  A tracheostomy tube is noted.  Lungs: There are no lung consolidations.  Heart: There is cardiomegaly.  Mediastinum: The mediastinum is within normal limits.    < end of copied text >    Advanced directives addressed: full resuscitation

## 2020-05-23 NOTE — PROGRESS NOTE ADULT - ASSESSMENT
PROBLEMS;    s/p staph bactermia/sputum pseudomonas/staph  Ac on chronic hypercapnic & hypoxamic respiratory failure-s/p trach & PEG  sputum-Few Pseudomonas aeruginosa (Carbapenem Resistant)/Moderate Methicillin resistant Staphylococcus aureus  afib with RVR  OHS/VIJAY  AC flare of COPD/chronic vs acute on chronic bronchitis  Mild interstitial lung disease-NSIP h/o smoking  COVID negativex2  Pulmonary hypertension  Nonobstructing stone in the left ureterovesicular junction/ nonobstructing stone in the lower pole right kidney.  Subacute hemorrhage in the right rectus abdominis along the anterior sheath, measures 1.6 cm in thickness and extends from the umbilicus to the inferior myotendinous junction  Cirrhosis  Mild splenomegaly-portal venous hypertension.  Chronic afib w Watchman device  CHF  BPH  GERD  ETOH abuse  seizures disorder    PLAN;    pulmonary slow recovery  vent support-pat multiple failed weaning attempts- s/p trach weaning as tolerated  iv ancef for bactermia/iv cipro for pseudomonas  supportive care  covid repeat testing-neg  Eliquis/asa 81  d/w staff

## 2020-05-24 LAB — SARS-COV-2 RNA SPEC QL NAA+PROBE: SIGNIFICANT CHANGE UP

## 2020-05-24 PROCEDURE — 99233 SBSQ HOSP IP/OBS HIGH 50: CPT

## 2020-05-24 RX ORDER — ALPRAZOLAM 0.25 MG
0.25 TABLET ORAL ONCE
Refills: 0 | Status: DISCONTINUED | OUTPATIENT
Start: 2020-05-24 | End: 2020-05-24

## 2020-05-24 RX ORDER — DILTIAZEM HCL 120 MG
90 CAPSULE, EXT RELEASE 24 HR ORAL EVERY 6 HOURS
Refills: 0 | Status: DISCONTINUED | OUTPATIENT
Start: 2020-05-24 | End: 2020-06-22

## 2020-05-24 RX ORDER — METOPROLOL TARTRATE 50 MG
5 TABLET ORAL ONCE
Refills: 0 | Status: COMPLETED | OUTPATIENT
Start: 2020-05-24 | End: 2020-05-24

## 2020-05-24 RX ADMIN — Medication 200 MILLIGRAM(S): at 17:48

## 2020-05-24 RX ADMIN — CHLORHEXIDINE GLUCONATE 15 MILLILITER(S): 213 SOLUTION TOPICAL at 17:48

## 2020-05-24 RX ADMIN — Medication 90 MILLIGRAM(S): at 17:48

## 2020-05-24 RX ADMIN — Medication 0.5 MILLIGRAM(S): at 17:48

## 2020-05-24 RX ADMIN — Medication 90 MILLIGRAM(S): at 12:25

## 2020-05-24 RX ADMIN — CHLORHEXIDINE GLUCONATE 15 MILLILITER(S): 213 SOLUTION TOPICAL at 06:13

## 2020-05-24 RX ADMIN — Medication 100 MILLIGRAM(S): at 22:07

## 2020-05-24 RX ADMIN — APIXABAN 5 MILLIGRAM(S): 2.5 TABLET, FILM COATED ORAL at 06:53

## 2020-05-24 RX ADMIN — Medication 2 MILLIGRAM(S): at 22:07

## 2020-05-24 RX ADMIN — Medication 200 MILLIGRAM(S): at 06:53

## 2020-05-24 RX ADMIN — Medication 0.5 MILLIGRAM(S): at 06:53

## 2020-05-24 RX ADMIN — PANTOPRAZOLE SODIUM 40 MILLIGRAM(S): 20 TABLET, DELAYED RELEASE ORAL at 12:25

## 2020-05-24 RX ADMIN — Medication 0.25 MILLIGRAM(S): at 01:07

## 2020-05-24 RX ADMIN — Medication 650 MILLIGRAM(S): at 15:59

## 2020-05-24 RX ADMIN — Medication 5 MILLIGRAM(S): at 02:57

## 2020-05-24 RX ADMIN — GABAPENTIN 400 MILLIGRAM(S): 400 CAPSULE ORAL at 22:07

## 2020-05-24 RX ADMIN — GABAPENTIN 400 MILLIGRAM(S): 400 CAPSULE ORAL at 15:59

## 2020-05-24 RX ADMIN — APIXABAN 5 MILLIGRAM(S): 2.5 TABLET, FILM COATED ORAL at 17:48

## 2020-05-24 RX ADMIN — Medication 90 MILLIGRAM(S): at 08:07

## 2020-05-24 RX ADMIN — Medication 100 MILLIGRAM(S): at 05:42

## 2020-05-24 RX ADMIN — GABAPENTIN 400 MILLIGRAM(S): 400 CAPSULE ORAL at 06:53

## 2020-05-24 RX ADMIN — Medication 100 MILLIGRAM(S): at 12:25

## 2020-05-24 RX ADMIN — Medication 81 MILLIGRAM(S): at 12:25

## 2020-05-24 RX ADMIN — Medication 100 MILLIGRAM(S): at 15:58

## 2020-05-24 NOTE — PROGRESS NOTE ADULT - SUBJECTIVE AND OBJECTIVE BOX
66 y/o male w chronic AFib w Watchman device, HFpEF , COPD on home O2, HTN, ETOH abuse  admitted on 4/18 with COPD exacerbation--RRT called on 4/23 for AMS requiring intubation--extubated on 4/26 then re intubated on 4/29.  S/P Trach and PEG on 5/15 and has failed SBT.    Patient is a 65y old  Male who presents with a chief complaint of acute hypoxemic, hypercapneic resp failure  COPD exacerbation (23 May 2020 12:54)    PAST MEDICAL & SURGICAL HISTORY:  Chronic CHF  COPD without exacerbation  Atrial fibrillation  Presence of Watchman left atrial appendage closure device  Hypertension  GERD (gastroesophageal reflux disease)  Chronic obstructive pulmonary disease (COPD)  Anemia  Falls  Meningitis  Collapsed lung  Alcohol withdrawal  Emphysema of lung  Cirrhosis  CHF (congestive heart failure)  Poor historian  Alcohol abuse  Chronic atrial fibrillation  Unilateral amputation of lower extremity below knee  Presence of Watchman left atrial appendage closure device  S/P BKA (below knee amputation) unilateral, left    CLATUS CARUANA   65y    Male    BRIEF HOSPITAL COURSE:    Review of Systems:                       All other ROS are negative.    Allergies    Ceclor (Rash)  Duricef (Rash)    Intolerances          ICU Vital Signs Last 24 Hrs  T(C): 37.2 (24 May 2020 00:00), Max: 37.6 (23 May 2020 06:00)  T(F): 99 (24 May 2020 00:00), Max: 99.7 (23 May 2020 06:00)  HR: 133 (24 May 2020 00:15) (90 - 138)  BP: 105/64 (24 May 2020 00:00) (99/53 - 133/74)  BP(mean): 74 (24 May 2020 00:00) (62 - 109)  ABP: --  ABP(mean): --  RR: 29 (24 May 2020 00:00) (17 - 34)  SpO2: 98% (24 May 2020 00:15) (96% - 100%)    Physical Examination:    General:  NAD    HEENT: trach site clean    PULM: bilateral BS     CVS:  tachy arreg    ABD: Zsoft peg    EXT: + edema     SKIN: warm     Neuro: weak       Mode: AC/ CMV (Assist Control/ Continuous Mandatory Ventilation)  RR (machine): 15  TV (machine): 450  FiO2: 35  PEEP: 5  ITime: 0.9  MAP: 17  PIP: 39    Mode: AC/ CMV (Assist Control/ Continuous Mandatory Ventilation), RR (machine): 15, TV (machine): 450, FiO2: 35, PEEP: 5, ITime: 0.9, MAP: 17, PIP: 39  LABS:                        8.5    5.26  )-----------( 155      ( 23 May 2020 07:07 )             27.2     05-23    143  |  107  |  6<L>  ----------------------------<  134<H>  3.7   |  30  |  0.52    Ca    8.2<L>      23 May 2020 07:07  Phos  3.6     05-23  Mg     1.6     05-2      CAPILLARY BLOOD GLUCOSE    CULTURES:  Culture Results:   Growth in anaerobic bottle: Staphylococcus aureus  See previous culture [id number] (05-20 @ 06:41)  Culture Results:   Growth in aerobic and anaerobic bottles: Staphylococcus aureus  See previous culture [id number] (05-20 @ 06:41)  Culture Results:   Growth in aerobic and anaerobic bottles: Staphylococcus aureus  See previous culture [id number] (05-18 @ 11:53)  Culture Results:   Growth in aerobic and anaerobic bottles: Staphylococcus aureus  ***Blood Panel PCR results on this specimen are available  approximately 3 hours after the Gram stain result.***  Gram stain, PCR, and/or culture results may not always  correspond dueto difference in methodologies.  ************************************************************  This PCR assay was performed using [employer].  The following targets are tested for: Enterococcus,  vancomycin resistant enterococci, Listeria monocytogenes,  coagulase negative staphylococci, S. aureus,  methicillin resistant S. aureus, Streptococcus agalactiae  (Group B), S. pneumoniae, S. pyogenes (Group A),  Acinetobacter baumannii, Enterobacter cloacae, E. coli,  Klebsiella oxytoca, K. pneumoniae, Proteus sp.,  Serratia marcescens, Haemophilus influenzae,  Neisseria meningitidis, Pseudomonas aeruginosa, Candida  albicans, C. glabrata, C krusei, C parapsilosis,  C. tropicalis and the KPC resistance gene.  "Due to technical problems, Proteus sp. will Not be reported as part of  the BCID panel until further notice" (05-18 @ 11:44)  Culture Results:   Numerous Pseudomonas aeruginosa (Carbapenem Resistant)  Normal Respiratory Faith absent (05-18 @ 09:45)      Medications:  MEDICATIONS  (STANDING):  ALBUTerol    90 MICROgram(s) HFA Inhaler 2 Puff(s) Inhalation every 4 hours  ALPRAZolam 0.5 milliGRAM(s) Oral every 12 hours  apixaban 5 milliGRAM(s) Oral every 12 hours  aspirin  chewable 81 milliGRAM(s) Oral daily  budesonide 160 MICROgram(s)/formoterol 4.5 MICROgram(s) Inhaler 2 Puff(s) Inhalation two times a day  ceFAZolin   IVPB 2000 milliGRAM(s) IV Intermittent every 8 hours  chlorhexidine 0.12% Liquid 15 milliLiter(s) Oral Mucosa every 12 hours  ciprofloxacin   IVPB      ciprofloxacin   IVPB 400 milliGRAM(s) IV Intermittent every 12 hours  diltiazem    Tablet 90 milliGRAM(s) Oral every 6 hours  doxazosin 2 milliGRAM(s) Oral at bedtime  gabapentin 400 milliGRAM(s) Oral three times a day  metoprolol tartrate Injectable 5 milliGRAM(s) IV Push once  pantoprazole  Injectable 40 milliGRAM(s) IV Push daily  polyethylene glycol 3350 17 Gram(s) Oral daily  thiamine 100 milliGRAM(s) Oral daily  tiotropium 18 MICROgram(s) Capsule 1 Capsule(s) Inhalation daily    MEDICATIONS  (PRN):  acetaminophen   Tablet .. 650 milliGRAM(s) Oral every 6 hours PRN Temp greater or equal to 38.5C (101.3F)  metoclopramide Injectable 10 milliGRAM(s) IV Push every 8 hours PRN Vomiting        05-22 @ 07:01  -  05-23 @ 07:00  --------------------------------------------------------  IN: 2890 mL / OUT: 2850 mL / NET: 40 mL    05-23 @ 07:01  -  05-24 @ 02:50  --------------------------------------------------------  IN: 1536 mL / OUT: 2425 mL / NET: -889 mL        RADIOLOGY/IMAGING/ECHO  < from: Xray Chest 1 View- PORTABLE-Urgent (05.18.20 @ 09:49) >  MPRESSION:    No lung consolidations.      Assessment/Plan:    66 y/o male w chronic AFib w Watchman device, HFpEF , COPD on home O2, HTN, ETOH abuse  admitted on 4/18 with COPD exacerbation.  Now S/P trach and PEG marginal SBT's    Persistent MSSA bacteremia   MSSA sepsis ??? SBE.  For MYA on the 26th  CRP sputum cipro.    Afib with RVR    HR to 170's now, inc cardizem to 90 Q 6 lopressor 5mg IVP now x1  Full AC apixaban

## 2020-05-24 NOTE — PROGRESS NOTE ADULT - SUBJECTIVE AND OBJECTIVE BOX
Hospital # 35  ICU # 30  Vent # 22 and Trach and PEG # 8    CC:  Respiratory Failure     HPI:    66 y/o male w chronic AFib w Watchman device, HFpEF , COPD on home O2, HTN, ETOH use and pt of size admitted on 4/18 with COPD exacerbation--RRT called on 4/23 for AMS requiring intubation--extubated on 4/26 then re intubated on 4/29.  S/P Trach and PEG on 5/15 and has failed SBT.  Most recent active issues is persistent MSSA sepsis and CRP in sputum--on IV vanco and Cipro.  No central access      5/22:  Pt seen and examined in ICU--vented-- Encephalopathy.  still MSSA + in Bcx  5/23:  Tolerated PSV 12 x 3 hrs on 5/22.  SBT ongoing now.  Poor mentation.  Repeat BCx sent.  Abx changed to Ancef  5/24:  PSV X 4.5 hrs on 5/23.  Rapid AFib O/N.  Still Encephalopathy.  COVID sent.  5/23 BCx still P.      PMH:  As above.     PSH:  As above.     FH: Non Contributory other than those listed in HPI    Social History:  Unobtainable due to clinical condition     MEDICATIONS  (STANDING):  ALBUTerol    90 MICROgram(s) HFA Inhaler 2 Puff(s) Inhalation every 4 hours  ALPRAZolam 0.5 milliGRAM(s) Oral every 12 hours  apixaban 5 milliGRAM(s) Oral every 12 hours  aspirin  chewable 81 milliGRAM(s) Oral daily  budesonide 160 MICROgram(s)/formoterol 4.5 MICROgram(s) Inhaler 2 Puff(s) Inhalation two times a day  ceFAZolin   IVPB 2000 milliGRAM(s) IV Intermittent every 8 hours  chlorhexidine 0.12% Liquid 15 milliLiter(s) Oral Mucosa every 12 hours  ciprofloxacin   IVPB      ciprofloxacin   IVPB 400 milliGRAM(s) IV Intermittent every 12 hours  diltiazem    Tablet 90 milliGRAM(s) Oral every 6 hours  doxazosin 2 milliGRAM(s) Oral at bedtime  gabapentin 400 milliGRAM(s) Oral three times a day  pantoprazole  Injectable 40 milliGRAM(s) IV Push daily  polyethylene glycol 3350 17 Gram(s) Oral daily  thiamine 100 milliGRAM(s) Oral daily  tiotropium 18 MICROgram(s) Capsule 1 Capsule(s) Inhalation daily    MEDICATIONS  (PRN):  acetaminophen   Tablet .. 650 milliGRAM(s) Oral every 6 hours PRN Temp greater or equal to 38.5C (101.3F)  metoclopramide Injectable 10 milliGRAM(s) IV Push every 8 hours PRN Vomiting      Allergies: NKDA    ROS:  SEE BELOW        ICU Vital Signs Last 24 Hrs  T(C): 37.2 (24 May 2020 10:00), Max: 37.6 (23 May 2020 22:00)  T(F): 99 (24 May 2020 10:00), Max: 99.7 (23 May 2020 22:00)  HR: 116 (24 May 2020 11:00) (84 - 150)  BP: 141/83 (24 May 2020 11:00) (88/44 - 141/83)  BP(mean): 96 (24 May 2020 11:00) (52 - 109)  ABP: --  ABP(mean): --  RR: 21 (24 May 2020 11:00) (17 - 31)  SpO2: 97% (24 May 2020 11:00) (90% - 100%)      Mode: AC/ CMV (Assist Control/ Continuous Mandatory Ventilation)  RR (machine): 15  TV (machine): 450  FiO2: 35  PEEP: 5  ITime: 0.9  MAP: 11  PIP: 31      I&O's Summary    23 May 2020 07:01  -  24 May 2020 07:00  --------------------------------------------------------  IN: 3296 mL / OUT: 3865 mL / NET: -569 mL        Physical Exam:  SEE BELOW                          8.5    5.26  )-----------( 155      ( 23 May 2020 07:07 )             27.2       05-23    143  |  107  |  6<L>  ----------------------------<  134<H>  3.7   |  30  |  0.52    Ca    8.2<L>      23 May 2020 07:07  Phos  3.6     05-23  Mg     1.6     05-23                      DVT Prophylaxis:                                                            Contraindication:     Advanced Directives:    Discussed with:    Visit Information:  Time spent excluding procedure:      ** Time is exclusive of billed procedures and/or teaching and/or routine family updates.

## 2020-05-24 NOTE — PROGRESS NOTE ADULT - SUBJECTIVE AND OBJECTIVE BOX
REASON FOR VISIT: ICU team requested a MYA    HPI:  65 year old man with a history of chronic AF, Watchman device (implanted 4/2019), HTN, CVAs, alcoholism, tobacco abuse, COPD, HFpEF, GERD, cirrhosis, left BKA following traumatic injury admitted on 4/18/2020 with  acute on chronic hypercapnic & hypoxemic respiratory failure with hospitalization complicated by encephalopathy, ventilator-dependence, and persistent MRSA bacteremia --critical care team and ID consultant request MYA to assess for vegetation.    5/22/2020:  Awake but encephalopathic.  5/23/2020:  Nods that he understands me but seems to lose attention quickly; denies pain.  5/24/20: comfortable on vent.    MEDICATIONS  (STANDING):  ALBUTerol    90 MICROgram(s) HFA Inhaler 2 Puff(s) Inhalation every 4 hours  ALPRAZolam 0.5 milliGRAM(s) Oral every 12 hours  apixaban 5 milliGRAM(s) Oral every 12 hours  aspirin  chewable 81 milliGRAM(s) Oral daily  budesonide 160 MICROgram(s)/formoterol 4.5 MICROgram(s) Inhaler 2 Puff(s) Inhalation two times a day  ceFAZolin   IVPB 2000 milliGRAM(s) IV Intermittent every 8 hours  chlorhexidine 0.12% Liquid 15 milliLiter(s) Oral Mucosa every 12 hours  ciprofloxacin   IVPB      ciprofloxacin   IVPB 400 milliGRAM(s) IV Intermittent every 12 hours  diltiazem    Tablet 90 milliGRAM(s) Oral every 6 hours  doxazosin 2 milliGRAM(s) Oral at bedtime  gabapentin 400 milliGRAM(s) Oral three times a day  pantoprazole  Injectable 40 milliGRAM(s) IV Push daily  polyethylene glycol 3350 17 Gram(s) Oral daily  thiamine 100 milliGRAM(s) Oral daily  tiotropium 18 MICROgram(s) Capsule 1 Capsule(s) Inhalation daily    MEDICATIONS  (PRN):  acetaminophen   Tablet .. 650 milliGRAM(s) Oral every 6 hours PRN Temp greater or equal to 38.5C (101.3F)  metoclopramide Injectable 10 milliGRAM(s) IV Push every 8 hours PRN Vomiting      Vital Signs Last 24 Hrs  T(C): 36.1 (24 May 2020 06:00), Max: 37.6 (23 May 2020 22:00)  T(F): 97 (24 May 2020 06:00), Max: 99.7 (23 May 2020 22:00)  HR: 113 (24 May 2020 08:00) (84 - 150)  BP: 119/69 (24 May 2020 08:00) (88/44 - 133/74)  BP(mean): 78 (24 May 2020 08:00) (52 - 109)  RR: 28 (24 May 2020 08:00) (17 - 34)  SpO2: 100% (24 May 2020 08:00) (96% - 100%)    I&O's Detail    23 May 2020 07:01  -  24 May 2020 07:00  --------------------------------------------------------  IN:    Enteral Tube Flush: 1056 mL    IV PiggyBack: 550 mL    ns in tub fed  elgfev42: 1690 mL  Total IN: 3296 mL    OUT:    Intermittent Catheterization - Urethral: 600 mL    Rectal Tube: 300 mL    Voided: 2965 mL  Total OUT: 3865 mL    Total NET: -569 mL      PHYSICAL EXAM:  Constitutional: Awake, looking around room, no distress  HEENT: Trach to vent  Respiratory: Lungs clear  Cardiovascular: Irregular, tachycardic  Gastrointestinal: Abdomen is soft, PEG   Extremities:  L BKA; right pedal edema     LABS:                                    8.5    5.26  )-----------( 155      ( 23 May 2020 07:07 )             27.2     05-23    143  |  107  |  6<L>  ----------------------------<  134<H>  3.7   |  30  |  0.52    Ca    8.2<L>      23 May 2020 07:07  Phos  3.6     05-23  Mg     1.6     05-23    05-10 Chol -- LDL -- HDL -- Trig 106    Transthoracic Echocardiogram Follow Up (04.21.20 @ 09:10):   Moderate (2+) mitral regurgitation.   Moderate (2+) tricuspid valve regurgitation.   The left atrium is moderately dilated.   The left ventricle cavity is moderately dilated.   Normal left ventricular systolic function. Mild concentric left ventricular hypertrophy is present. Estimated left ventricular ejection fraction is 60-65 %.   The right atrium appears moderately dilated.   Normal appearing right ventricle structure and function.     TTE Echo Complete w/o contrast w/ Doppler (05.19.20 @ 11:14):   The left ventricle is normal in size, wall motion and contractility. Mild concentric left ventricular hypertrophy is present. Estimated left ventricular ejection fraction is 55-60 %.   The left atrium is moderately dilated.   The right atrium appears moderately dilated.   Normal appearing right ventricle structure and function.   Moderate (2+) mitral regurgitation is present.   Mild to moderate Tricuspid regurgitation is present.   Trace pulmonic valvular regurgitation is present.   No evidence of pericardial effusion.   Pleural effusion cannot be ruled out.    Tele: AF    Culture - Blood in AM (05.20.20 @ 06:41):  Growth in aerobic bottle: Gram Positive Cocci in Clusters.   Staphylococcus aureus

## 2020-05-24 NOTE — CHART NOTE - NSCHARTNOTEFT_GEN_A_CORE
Spoke with Litzy via phone--[phone].  All concerns addressed including but not limited to diagnosis, treatment plan and overall prognosis.  ?? MYA on Tuesday--5/23 BCx negative thus far.  Informed Litzy horne will be taking over ICU starting tomorrow.

## 2020-05-24 NOTE — PROGRESS NOTE ADULT - SUBJECTIVE AND OBJECTIVE BOX
Subjective:    pat on vent, episode of bradyarrthmias on PS, now on AC.    Home Medications:  albuterol 2.5 mg/3 mL (0.083%) inhalation solution: 3 milliliter(s) inhaled every 6 hours, As Needed -for shortness of breath and/or wheezing (19 Apr 2020 03:12)  gabapentin 400 mg oral capsule: 1 cap(s) orally 3 times a day (19 Apr 2020 03:12)  pantoprazole 40 mg oral granule, enteric coated: 40 milligram(s) orally once a day (19 Apr 2020 03:12)  Spiriva 18 mcg inhalation capsule: 1 cap(s) inhaled once a day (19 Apr 2020 03:12)  tamsulosin 0.4 mg oral capsule: 1 cap(s) orally once a day (at bedtime) (19 Apr 2020 03:12)  traZODone 50 mg oral tablet: 2 tab(s) orally once a day (at bedtime), As Needed (19 Apr 2020 03:12)    MEDICATIONS  (STANDING):  ALBUTerol    90 MICROgram(s) HFA Inhaler 2 Puff(s) Inhalation every 4 hours  ALPRAZolam 0.5 milliGRAM(s) Oral every 12 hours  apixaban 5 milliGRAM(s) Oral every 12 hours  aspirin  chewable 81 milliGRAM(s) Oral daily  budesonide 160 MICROgram(s)/formoterol 4.5 MICROgram(s) Inhaler 2 Puff(s) Inhalation two times a day  ceFAZolin   IVPB 2000 milliGRAM(s) IV Intermittent every 8 hours  chlorhexidine 0.12% Liquid 15 milliLiter(s) Oral Mucosa every 12 hours  ciprofloxacin   IVPB      ciprofloxacin   IVPB 400 milliGRAM(s) IV Intermittent every 12 hours  diltiazem    Tablet 90 milliGRAM(s) Oral every 6 hours  doxazosin 2 milliGRAM(s) Oral at bedtime  gabapentin 400 milliGRAM(s) Oral three times a day  pantoprazole  Injectable 40 milliGRAM(s) IV Push daily  polyethylene glycol 3350 17 Gram(s) Oral daily  thiamine 100 milliGRAM(s) Oral daily  tiotropium 18 MICROgram(s) Capsule 1 Capsule(s) Inhalation daily    MEDICATIONS  (PRN):  acetaminophen   Tablet .. 650 milliGRAM(s) Oral every 6 hours PRN Temp greater or equal to 38.5C (101.3F)  metoclopramide Injectable 10 milliGRAM(s) IV Push every 8 hours PRN Vomiting      Allergies    Ceclor (Rash)  Duricef (Rash)    Intolerances        Vital Signs Last 24 Hrs  T(C): 37.2 (24 May 2020 10:00), Max: 37.6 (23 May 2020 22:00)  T(F): 99 (24 May 2020 10:00), Max: 99.7 (23 May 2020 22:00)  HR: 138 (24 May 2020 12:00) (84 - 150)  BP: 128/107 (24 May 2020 12:00) (88/44 - 141/83)  BP(mean): 112 (24 May 2020 12:00) (52 - 112)  RR: 25 (24 May 2020 12:00) (17 - 31)  SpO2: 97% (24 May 2020 12:00) (90% - 100%)  Mode: AC/ CMV (Assist Control/ Continuous Mandatory Ventilation)  RR (machine): 15  TV (machine): 450  FiO2: 35  PEEP: 5  ITime: 0.9  MAP: 11  PIP: 31      PHYSICAL EXAMINATION:    NECK:  Supple. No lymphadenopathy. Jugular venous pressure not elevated. Carotids equal.   HEART:   The cardiac impulse has a normal quality. Reg., Nl S1 and S2.  There are no murmurs, rubs or gallops noted  CHEST:  Chest is clear to auscultation. Normal respiratory effort.  ABDOMEN:  Soft and nontender.   EXTREMITIES:  There is no edema.       LABS:                        8.5    5.26  )-----------( 155      ( 23 May 2020 07:07 )             27.2     05-23    143  |  107  |  6<L>  ----------------------------<  134<H>  3.7   |  30  |  0.52    Ca    8.2<L>      23 May 2020 07:07  Phos  3.6     05-23  Mg     1.6     05-23

## 2020-05-24 NOTE — PROGRESS NOTE ADULT - ASSESSMENT
64 y/o male with h/o chronic A.Fib with Watchman device, CHF, COPD, HTN, obesity was admitted on 4/18 for worsening SOB. In ER he was found with rapid A.fib and was placed on NRB. He tested COVID-19 PCR negative x 3. Noted with hypercapnea and placed on BiPAP, then intubated and placed on ventilatory support. He was extubated on 4/26. He is reported with MDRO's in sputum c/s. He was treated with azithromycin from 4/20 to 4/25.     1. Low grade fever. Persistent sepsis with MSSA. Right arm cellulitis/ phlebitis. Acute respiratory failure. 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 Allergy to PCN and cephalosporines. Possible COVID-19 syndrome.   -fever is down  -concerned about sepsis with MSSA ?source ?endocarditis  -encephalopathy   -cultures reviewed; repeat sputum c/s shows CRE-PSAE  -s/p cipro IV # 9  -s/p vancomycin 1 gm IV q12h # 10 days until 5/7  -respiratory care  -repeat BC x 2 collected this AM  -s/p vancomycin 1 gm IV q12h # 4 days  -on cefazolin 2 gm iV q8h # 3 and cipro IV # 3  -tolerating abx well so far; no side effects noted  -he had prior CRE-PSAE; new sputum culture shows PSAE again; CXR dose not show infiltrates  -f/u closely for rashes  -monitor closely in tod of duricef allergy history  -continue abx coverage  -cardiology evaluation appreciated; plan for MYA  -monitor temps  -f/u CBC  -supportive care  2. Other issues:   -care per medicine

## 2020-05-24 NOTE — PROGRESS NOTE ADULT - SUBJECTIVE AND OBJECTIVE BOX
Date of service: 05-24-20 @ 11:58    Lying in bed in NAD  Trache  On ventilatory support  Has low grade fever    ROS: unobtainable    MEDICATIONS  (STANDING):  ALBUTerol    90 MICROgram(s) HFA Inhaler 2 Puff(s) Inhalation every 4 hours  ALPRAZolam 0.5 milliGRAM(s) Oral every 12 hours  apixaban 5 milliGRAM(s) Oral every 12 hours  aspirin  chewable 81 milliGRAM(s) Oral daily  budesonide 160 MICROgram(s)/formoterol 4.5 MICROgram(s) Inhaler 2 Puff(s) Inhalation two times a day  ceFAZolin   IVPB 2000 milliGRAM(s) IV Intermittent every 8 hours  chlorhexidine 0.12% Liquid 15 milliLiter(s) Oral Mucosa every 12 hours  ciprofloxacin   IVPB      ciprofloxacin   IVPB 400 milliGRAM(s) IV Intermittent every 12 hours  diltiazem    Tablet 90 milliGRAM(s) Oral every 6 hours  doxazosin 2 milliGRAM(s) Oral at bedtime  gabapentin 400 milliGRAM(s) Oral three times a day  pantoprazole  Injectable 40 milliGRAM(s) IV Push daily  polyethylene glycol 3350 17 Gram(s) Oral daily  thiamine 100 milliGRAM(s) Oral daily  tiotropium 18 MICROgram(s) Capsule 1 Capsule(s) Inhalation daily      Vital Signs Last 24 Hrs  T(C): 37.2 (24 May 2020 10:00), Max: 37.6 (23 May 2020 22:00)  T(F): 99 (24 May 2020 10:00), Max: 99.7 (23 May 2020 22:00)  HR: 116 (24 May 2020 11:00) (84 - 150)  BP: 141/83 (24 May 2020 11:00) (88/44 - 141/83)  BP(mean): 96 (24 May 2020 11:00) (52 - 109)  RR: 21 (24 May 2020 11:00) (17 - 31)  SpO2: 97% (24 May 2020 11:00) (90% - 100%)    Mode: AC/ CMV (Assist Control/ Continuous Mandatory Ventilation), RR (machine): 15, TV (machine): 450, FiO2: 35, PEEP: 5, ITime: 0.9, MAP: 11, PIP: 31    Physical exam:    Constitutional:  No acute distress  HEENT: NC/AT, EOMI, PERRLA, conjunctivae clear  Neck: supple; thyroid not palpable  Back: no tenderness  Respiratory: respiratory effort normal; b/l rhonchi  Cardiovascular: S1S2 regular, no murmurs  Abdomen: soft, not tender, not distended, positive BS  Genitourinary: no suprapubic tenderness  Lymphatic: no LN palpable  Musculoskeletal: no muscle tenderness, no joint swelling or tenderness  Extremities: no pedal edema  Right arm area of erythema with phlebitis - resolving  Neurological/ Psychiatric: confused; moving all extremities  Skin: no rashes; no palpable lesions    Labs: reviewed                        8.5    5.26  )-----------( 155      ( 23 May 2020 07:07 )             27.2     05-23    143  |  107  |  6<L>  ----------------------------<  134<H>  3.7   |  30  |  0.52    Ca    8.2<L>      23 May 2020 07:07  Phos  3.6     05-23  Mg     1.6     05-2    Vancomycin Level, Trough: 9.8 ug/mL (05-21 @ 04:52)                                  12.5   8.31  )-----------( 197      ( 07 May 2020 08:15 )             37.6     05-07    137  |  100  |  33<H>  ----------------------------<  114<H>  4.9   |  31  |  0.83    Ca    8.5      07 May 2020 08:15  Phos  4.2     05-07  Mg     2.4     05-07      COVID-19 PCR . (04.26.20 @ 00:02)    COVID-19 PCR: NotDetec      Culture - Blood (collected 26 Apr 2020 01:01)  Source: .Blood None  Preliminary Report (27 Apr 2020 10:02):    No growth to date.    Culture - Blood (collected 26 Apr 2020 00:56)  Source: .Blood None  Preliminary Report (27 Apr 2020 10:02):    No growth to date.    Culture - Sputum (collected 25 Apr 2020 01:00)  Source: .Sputum Sputum  trap  Gram Stain (26 Apr 2020 12:29):    Moderate polymorphonuclear leukocytes per low power field    No Squamous epithelial cells per low power field    Rare Gram Positive Rods per oil power field    Rare Gram Positive Cocci in Pairs and Chains per oil power field  Final Report (28 Apr 2020 10:53):    Few Pseudomonas aeruginosa (Carbapenem Resistant)    Moderate Methicillin resistant Staphylococcus aureus    Normal Respiratory Faith present  Organism: Pseudomonas aeruginosa (Carbapenem Resistant)  Methicillin resistant Staphylococcus aureus (28 Apr 2020 10:44)  Organism: Pseudomonas aeruginosa (Carbapenem Resistant) (28 Apr 2020 10:44)      -  Amikacin: S <=16      -  Aztreonam: R >16      -  Cefepime: R >16      -  Ceftazidime: R >16      -  Ciprofloxacin: S <=0.25      -  Gentamicin: S 4      -  Imipenem: R >8      -  Levofloxacin: S <=0.5      -  Meropenem: R >8      -  Piperacillin/Tazobactam: R >64      -  Tobramycin: S <=2      Method Type: LADY  Organism: Methicillin resistant Staphylococcus aureus (28 Apr 2020 10:40)      -  Amoxicillin/Clavulanic Acid: R >4/2      -  Ampicillin: R >8      -  Ampicillin/Sulbactam: R <=8/4      -  Cefazolin: R >16      -  Ceftriaxone: R >32      -  Ciprofloxacin: R >2      -  Clindamycin: R >4      -  Daptomycin: S 0.5      -  Erythromycin: R >4      -  Gentamicin: S <=1 Should not be used as monotherapy      -  Levofloxacin: R >4      -  Linezolid: S 4      -  Meropenem: R >8      -  Moxifloxacin(Aerobic): R >4      -  Oxacillin: R >2      -  Penicillin: R >8      -  RIF- Rifampin: S <=1 Should not be used as monotherapy      -  Tetra/Doxy: S 2      -  Trimethoprim/Sulfamethoxazole: S <=0.5/9.5      -  Vancomycin: S 2      Method Type: LADY    Culture - Urine (collected 21 Apr 2020 15:51)  Source: .Urine Clean Catch (Midstream)  Final Report (22 Apr 2020 17:03):    <10,000 CFU/mL Normal Urogenital Faith    Culture - Blood (collected 18 Apr 2020 19:43)  Source: .Blood Blood-Peripheral  Final Report (24 Apr 2020 01:01):    No Growth Final    Culture - Blood (05.18.20 @ 11:44)    -  Staphylococcus aureus: Detec Any isolate of Staphylococcus aureus from a blood culture is NOT considered a contaminant.    -  RIF- Rifampin: S <=1 Should not be used as monotherapy    -  Penicillin: R >8    -  Oxacillin: S <=0.25    -  Tetra/Doxy: S <=1    -  Trimethoprim/Sulfamethoxazole: S <=0.5/9.5    -  Vancomycin: S 2    -  Clindamycin: R 0.5 This isolate is presumed to be clindamycin resistant based on detection of inducible resistance. Clindamycin may still be effective in some patients.    -  Gentamicin: S <=1 Should not be used as monotherapy    -  Erythromycin: R >4    -  Ampicillin/Sulbactam: S <=8/4    -  Cefazolin: S <=4    Gram Stain:   Growth in aerobic bottle: Gram Positive Cocci in Clusters  Growth in anaerobic bottle: Gram Positive Cocci in Clusters    Specimen Source: .Blood None    Organism: Staphylococcus aureus    Organism: Staphylococcus aureus    Culture Results:   Growth in aerobic and anaerobic bottles: Staphylococcus aureus    Organism Identification: Staphylococcus aureus  Staphylococcus aureus    Method Type: LADY    Method Type: PCR        Culture - Sputum . (05.18.20 @ 09:45)    -  Tobramycin: S <=2    -  Piperacillin/Tazobactam: R >64    -  Meropenem: R >8    -  Levofloxacin: S <=0.5    Gram Stain:   Numerous polymorphonuclear leukocytes per low power field  No Squamous epithelial cells per low power field  Numerous Gram Negative Rods per oil power field    -  Amikacin: S <=16    -  Cefepime: R >16    -  Aztreonam: R >16    -  Imipenem: R >8    -  Gentamicin: S 4    -  Ciprofloxacin: S <=0.25    -  Ceftazidime: R >16    Specimen Source: .Sputum Sputums trap    Culture Results:   Numerous Pseudomonas aeruginosa (Carbapenem Resistant)  Normal Respiratory Faith absent    Organism Identification: Pseudomonas aeruginosa (Carbapenem Resistant)    Organism: Pseudomonas aeruginosa (Carbapenem Resistant)    Method Type: LADY    Culture - Blood in AM (05.20.20 @ 06:41)    Gram Stain:   Growth in aerobic bottle: Gram Positive Cocci in Clusters  Growth in anaerobic bottle: Gram Positive Cocci in Clusters    Specimen Source: .Blood None    Culture Results:   Growth in aerobic and anaerobic bottles: Staphylococcus aureus  See previous culture [id number]    COVID-19 PCR . (05.14.20 @ 08:10)    COVID-19 PCR: NotDetec    Radiology: all available radiological tests reviewed    < from: Xray Chest 1 View- PORTABLE-Urgent (05.18.20 @ 09:49) >  A tracheostomy tube is noted.  Lungs: There are no lung consolidations.  Heart: There is cardiomegaly.  Mediastinum: The mediastinum is within normal limits.    < end of copied text >    Advanced directives addressed: full resuscitation

## 2020-05-24 NOTE — PROGRESS NOTE ADULT - ASSESSMENT
IMP:    64 y/o male w chronic AFib w Watchman device, HFpEF , COPD on home O2, HTN, ETOH use and pt of size admitted on 4/18 with COPD exacerbation--RRT called on 4/23 for AMS requiring intubation--extubated on 4/26 then re intubated on 4/29.  S/P Trach and PEG on 5/15 and has failed SBT.  Most recent active issues is persistent MSSA sepsis and CRP in sputum--on IV Ancef and Cipro.  No central access   Chronic resp failure  TM Encephalopathy     High risk for acute decompensation and deterioration including death     Plan:    SBT as tolerated  IV vanco (5)--stopped on 5/22.  Ancef (3) and Cipro (4).  Repeat BCx 5/23 still P  COVID sent today in case he goes for MYA on Tuesday  Decreased Xanax to 0.5 q 12  Cont apixaban and ASA for CAD and AFib--Dilt increased to 90 q6   TF to goal  HOB > 30  Started cardura   DVT prophy--AC  Check labs in AM    ICU care--d/w ICU staff on multi disciplinary rounds

## 2020-05-25 LAB
ANION GAP SERPL CALC-SCNC: 4 MMOL/L — LOW (ref 5–17)
BUN SERPL-MCNC: 9 MG/DL — SIGNIFICANT CHANGE UP (ref 7–23)
CALCIUM SERPL-MCNC: 8.5 MG/DL — SIGNIFICANT CHANGE UP (ref 8.5–10.1)
CHLORIDE SERPL-SCNC: 106 MMOL/L — SIGNIFICANT CHANGE UP (ref 96–108)
CO2 SERPL-SCNC: 32 MMOL/L — HIGH (ref 22–31)
CREAT SERPL-MCNC: 0.47 MG/DL — LOW (ref 0.5–1.3)
GLUCOSE SERPL-MCNC: 125 MG/DL — HIGH (ref 70–99)
HCT VFR BLD CALC: 27 % — LOW (ref 39–50)
HGB BLD-MCNC: 8.4 G/DL — LOW (ref 13–17)
MAGNESIUM SERPL-MCNC: 1.4 MG/DL — LOW (ref 1.6–2.6)
MCHC RBC-ENTMCNC: 31 PG — SIGNIFICANT CHANGE UP (ref 27–34)
MCHC RBC-ENTMCNC: 31.1 GM/DL — LOW (ref 32–36)
MCV RBC AUTO: 99.6 FL — SIGNIFICANT CHANGE UP (ref 80–100)
PHOSPHATE SERPL-MCNC: 3.5 MG/DL — SIGNIFICANT CHANGE UP (ref 2.5–4.5)
PLATELET # BLD AUTO: 213 K/UL — SIGNIFICANT CHANGE UP (ref 150–400)
POTASSIUM SERPL-MCNC: 3.7 MMOL/L — SIGNIFICANT CHANGE UP (ref 3.5–5.3)
POTASSIUM SERPL-SCNC: 3.7 MMOL/L — SIGNIFICANT CHANGE UP (ref 3.5–5.3)
RBC # BLD: 2.71 M/UL — LOW (ref 4.2–5.8)
RBC # FLD: 13.2 % — SIGNIFICANT CHANGE UP (ref 10.3–14.5)
SODIUM SERPL-SCNC: 142 MMOL/L — SIGNIFICANT CHANGE UP (ref 135–145)
WBC # BLD: 5.89 K/UL — SIGNIFICANT CHANGE UP (ref 3.8–10.5)
WBC # FLD AUTO: 5.89 K/UL — SIGNIFICANT CHANGE UP (ref 3.8–10.5)

## 2020-05-25 PROCEDURE — 99233 SBSQ HOSP IP/OBS HIGH 50: CPT

## 2020-05-25 PROCEDURE — 99232 SBSQ HOSP IP/OBS MODERATE 35: CPT

## 2020-05-25 RX ORDER — OXYCODONE HYDROCHLORIDE 5 MG/1
5 TABLET ORAL EVERY 6 HOURS
Refills: 0 | Status: DISCONTINUED | OUTPATIENT
Start: 2020-05-25 | End: 2020-06-01

## 2020-05-25 RX ORDER — SODIUM CHLORIDE 9 MG/ML
1000 INJECTION, SOLUTION INTRAVENOUS
Refills: 0 | Status: DISCONTINUED | OUTPATIENT
Start: 2020-05-25 | End: 2020-05-27

## 2020-05-25 RX ORDER — IBUPROFEN 200 MG
400 TABLET ORAL EVERY 6 HOURS
Refills: 0 | Status: DISCONTINUED | OUTPATIENT
Start: 2020-05-25 | End: 2020-07-08

## 2020-05-25 RX ADMIN — APIXABAN 5 MILLIGRAM(S): 2.5 TABLET, FILM COATED ORAL at 18:01

## 2020-05-25 RX ADMIN — GABAPENTIN 400 MILLIGRAM(S): 400 CAPSULE ORAL at 15:24

## 2020-05-25 RX ADMIN — CHLORHEXIDINE GLUCONATE 15 MILLILITER(S): 213 SOLUTION TOPICAL at 18:02

## 2020-05-25 RX ADMIN — Medication 90 MILLIGRAM(S): at 05:30

## 2020-05-25 RX ADMIN — Medication 90 MILLIGRAM(S): at 18:01

## 2020-05-25 RX ADMIN — Medication 90 MILLIGRAM(S): at 00:56

## 2020-05-25 RX ADMIN — Medication 81 MILLIGRAM(S): at 11:44

## 2020-05-25 RX ADMIN — Medication 100 MILLIGRAM(S): at 05:30

## 2020-05-25 RX ADMIN — Medication 100 MILLIGRAM(S): at 11:44

## 2020-05-25 RX ADMIN — PANTOPRAZOLE SODIUM 40 MILLIGRAM(S): 20 TABLET, DELAYED RELEASE ORAL at 11:43

## 2020-05-25 RX ADMIN — GABAPENTIN 400 MILLIGRAM(S): 400 CAPSULE ORAL at 22:13

## 2020-05-25 RX ADMIN — OXYCODONE HYDROCHLORIDE 5 MILLIGRAM(S): 5 TABLET ORAL at 22:48

## 2020-05-25 RX ADMIN — Medication 650 MILLIGRAM(S): at 11:44

## 2020-05-25 RX ADMIN — Medication 200 MILLIGRAM(S): at 06:37

## 2020-05-25 RX ADMIN — CHLORHEXIDINE GLUCONATE 15 MILLILITER(S): 213 SOLUTION TOPICAL at 05:30

## 2020-05-25 RX ADMIN — Medication 2 MILLIGRAM(S): at 22:13

## 2020-05-25 RX ADMIN — Medication 0.5 MILLIGRAM(S): at 05:30

## 2020-05-25 RX ADMIN — Medication 100 MILLIGRAM(S): at 22:13

## 2020-05-25 RX ADMIN — Medication 90 MILLIGRAM(S): at 11:44

## 2020-05-25 RX ADMIN — Medication 0.5 MILLIGRAM(S): at 18:00

## 2020-05-25 RX ADMIN — Medication 100 MILLIGRAM(S): at 15:24

## 2020-05-25 RX ADMIN — OXYCODONE HYDROCHLORIDE 5 MILLIGRAM(S): 5 TABLET ORAL at 15:56

## 2020-05-25 RX ADMIN — Medication 200 MILLIGRAM(S): at 18:00

## 2020-05-25 RX ADMIN — APIXABAN 5 MILLIGRAM(S): 2.5 TABLET, FILM COATED ORAL at 05:30

## 2020-05-25 RX ADMIN — GABAPENTIN 400 MILLIGRAM(S): 400 CAPSULE ORAL at 05:30

## 2020-05-25 NOTE — PROGRESS NOTE ADULT - SUBJECTIVE AND OBJECTIVE BOX
PMHx - chronic AFib w Watchman device, CHF, COPD, HTN, obesity     Hospital # 36  ICU # 31  Vent # 23   Trach and PEG # 9    COVID-19 NEGATIVE     66 y/o M admitted on 4/18 with COPD exacerbation -- RRT called on 4/23 for AMS requiring intubation  extubated on 4/26 then re intubated on 4/29.    S/P Trach and PEG on 5/15 and has failed SBT.    Most recent active issues is persistent MSSA sepsis and CRP in sputum--on IV vanco and Cipro.  No central access      5/22:  remains intubated, encephalopathy.  (+) MSSA + in Bcx  5/23:  Tolerating limited PSV - persistent poor mentation.  Repeat BCx sent.  Abx changed to Ancef  5/24:  Rapid AFib O/N.  Still Encephalopathy.  COVID sent.  5/23 BCx still P.          PAST MEDICAL HX  Alcohol abuse    Alcohol withdrawal    Anemia    AFIB atrial fibrillation    CHF (congestive heart failure)    Chronic atrial fibrillation    Chronic CHF    Chronic obstructive pulmonary disease (COPD)    Cirrhosis    Collapsed lung    COPD without exacerbation    Emphysema of lung    Falls    GERD (gastroesophageal reflux disease)    HTN hypertension    Meningitis    Poor historian    Presence of Watchman left atrial appendage closure device.    PAST SURGICAL HX  Presence of Watchman left atrial appendage closure device    S/P BKA (below knee amputation) unilateral, left    Unilateral amputation of lower extremity below knee.      FAMILY HX  Family history unknown: Adopted      SOCIAL HX  lives alone  former smoker  alcohol use last 2-3 days ago (19 Apr 2020 03:19)      PAST MEDICAL & SURGICAL HISTORY:  Chronic CHF  COPD without exacerbation  Atrial fibrillation  Presence of Watchman left atrial appendage closure device  Hypertension  GERD (gastroesophageal reflux disease)  Chronic obstructive pulmonary disease (COPD)  Anemia  Falls  Meningitis  Collapsed lung  Alcohol withdrawal  Emphysema of lung  Cirrhosis  CHF (congestive heart failure)  Poor historian  Alcohol abuse  Chronic atrial fibrillation  Unilateral amputation of lower extremity below knee  Presence of Watchman left atrial appendage closure device  S/P BKA (below knee amputation) unilateral, left      FAMILY HISTORY:  No pertinent family history in first degree relatives  Family history unknown: Adopted      Social Hx:    Allergies    Ceclor (Rash)  Duricef (Rash)    Intolerances        65y        ICU Vital Signs Last 24 Hrs  T(C): 37.1 (25 May 2020 06:00), Max: 37.2 (24 May 2020 10:00)  T(F): 98.8 (25 May 2020 06:00), Max: 99 (24 May 2020 10:00)  HR: 88 (25 May 2020 07:00) (78 - 142)  BP: 116/57 (25 May 2020 07:00) (100/71 - 141/83)  BP(mean): 70 (25 May 2020 07:00) (56 - 112)  ABP: --  ABP(mean): --  RR: 23 (25 May 2020 07:00) (18 - 29)  SpO2: 100% (25 May 2020 07:00) (97% - 100%)      Mode: AC/ CMV (Assist Control/ Continuous Mandatory Ventilation)  RR (machine): 15  TV (machine): 450  FiO2: 35  PEEP: 5  ITime: 0.9  MAP: 15  PIP: 39      I&O's Summary    24 May 2020 07:01  -  25 May 2020 07:00  --------------------------------------------------------  IN: 3273 mL / OUT: 4150 mL / NET: -877 mL                              8.4    5.89  )-----------( 213      ( 25 May 2020 07:31 )             27.0       05-25    142  |  106  |  9   ----------------------------<  125<H>  3.7   |  32<H>  |  0.47<L>    Ca    8.5      25 May 2020 07:31  Phos  3.5     05-25  Mg     1.4     05-25        CAPILLARY BLOOD GLUCOSE                                MEDICATIONS  (STANDING):  ALBUTerol    90 MICROgram(s) HFA Inhaler 2 Puff(s) Inhalation every 4 hours  ALPRAZolam 0.5 milliGRAM(s) Oral every 12 hours  apixaban 5 milliGRAM(s) Oral every 12 hours  aspirin  chewable 81 milliGRAM(s) Oral daily  budesonide 160 MICROgram(s)/formoterol 4.5 MICROgram(s) Inhaler 2 Puff(s) Inhalation two times a day  ceFAZolin   IVPB 2000 milliGRAM(s) IV Intermittent every 8 hours  chlorhexidine 0.12% Liquid 15 milliLiter(s) Oral Mucosa every 12 hours  ciprofloxacin   IVPB      ciprofloxacin   IVPB 400 milliGRAM(s) IV Intermittent every 12 hours  diltiazem    Tablet 90 milliGRAM(s) Oral every 6 hours  doxazosin 2 milliGRAM(s) Oral at bedtime  gabapentin 400 milliGRAM(s) Oral three times a day  pantoprazole  Injectable 40 milliGRAM(s) IV Push daily  polyethylene glycol 3350 17 Gram(s) Oral daily  thiamine 100 milliGRAM(s) Oral daily  tiotropium 18 MICROgram(s) Capsule 1 Capsule(s) Inhalation daily    MEDICATIONS  (PRN):  acetaminophen   Tablet .. 650 milliGRAM(s) Oral every 6 hours PRN Temp greater or equal to 38.5C (101.3F)  metoclopramide Injectable 10 milliGRAM(s) IV Push every 8 hours PRN Vomiting          DVT Prophylaxis:    Advanced Directives:  Discussed with:    Visit Information:          minutes of Critical Care time spent providing medical care for patient's acute illness/conditions that impairs at least one vital organ system and/or poses a high risk of imminent or life threatening deterioration in the patient's condition.  It includes time spent reviewing labs, radiology, discussing goals of care with patient and/or family, and discussing the case with a multidisciplinary team in an effort to prevent further life threatening deterioration or end organ damage.  This time is independent of any procedures performed.    ** Time is exclusive of billed procedures and/or teaching and/or routine family updates. Hospital # 36  ICU # 31  Vent # 23   Trach and PEG # 9    COVID-19 NEGATIVE     66 y/o M admitted on 4/18 with COPD exacerbation -- RRT called on 4/23 for AMS requiring intubation  extubated on 4/26 then re intubated on 4/29.    S/P Trach and PEG on 5/15 and has failed SBT.    Most recent active issues is persistent MSSA sepsis and CRP in sputum--on IV vanco and Cipro.  No central access      5/22:  remains intubated, encephalopathy.  (+) MSSA + in Bcx  5/23:  Tolerating limited PSV - persistent poor mentation.  Repeat BCx sent.  Abx changed to Ancef  5/24:  Rapid AFib O/N.  Still Encephalopathy.  COVID sent.  5/23 BCx still P.        above d/w Dr Dorado who has been caring for the patient.    5/25: cultures from 5/23 remain NGTD - COVID swab again negative on 5/24.    Hemodynamics reasonable.    Remains intubated, NOT sedated, mentation remains poor.  TF in progress.  On Abx.      PMHx - chronic AFib w Watchman device, CHF, COPD, HTN, obesity     Allergies    Ceclor (Rash)  Duricef (Rash)      ICU Vital Signs Last 24 Hrs  T(C): 37.1 (25 May 2020 06:00), Max: 37.2 (24 May 2020 10:00)  T(F): 98.8 (25 May 2020 06:00), Max: 99 (24 May 2020 10:00)  HR: 88 (25 May 2020 07:00) (78 - 142)  BP: 116/57 (25 May 2020 07:00) (100/71 - 141/83)  BP(mean): 70 (25 May 2020 07:00) (56 - 112)  RR: 23 (25 May 2020 07:00) (18 - 29)  SpO2: 100% (25 May 2020 07:00) (97% - 100%)      Mode: AC/ CMV (Assist Control/ Continuous Mandatory Ventilation)  RR (machine): 15  TV (machine): 450  FiO2: 35  PEEP: 5  ITime: 0.9  MAP: 15  PIP: 39      I&O's Summary    24 May 2020 07:01  -  25 May 2020 07:00  --------------------------------------------------------  IN: 3273 mL / OUT: 4150 mL / NET: -877 mL                              8.4    5.89  )-----------( 213      ( 25 May 2020 07:31 )             27.0       05-25    142  |  106  |  9   ----------------------------<  125<H>  3.7   |  32<H>  |  0.47<L>    Ca    8.5      25 May 2020 07:31  Phos  3.5     05-25  Mg     1.4     05-25          MEDICATIONS  (STANDING):  ALBUTerol    90 MICROgram(s) HFA Inhaler 2 Puff(s) Inhalation every 4 hours  ALPRAZolam 0.5 milliGRAM(s) Oral every 12 hours  apixaban 5 milliGRAM(s) Oral every 12 hours  aspirin  chewable 81 milliGRAM(s) Oral daily  budesonide 160 MICROgram(s)/formoterol 4.5 MICROgram(s) Inhaler 2 Puff(s) Inhalation two times a day  ceFAZolin   IVPB 2000 milliGRAM(s) IV Intermittent every 8 hours  chlorhexidine 0.12% Liquid 15 milliLiter(s) Oral Mucosa every 12 hours  ciprofloxacin   IVPB      ciprofloxacin   IVPB 400 milliGRAM(s) IV Intermittent every 12 hours  diltiazem    Tablet 90 milliGRAM(s) Oral every 6 hours  doxazosin 2 milliGRAM(s) Oral at bedtime  gabapentin 400 milliGRAM(s) Oral three times a day  pantoprazole  Injectable 40 milliGRAM(s) IV Push daily  polyethylene glycol 3350 17 Gram(s) Oral daily  thiamine 100 milliGRAM(s) Oral daily  tiotropium 18 MICROgram(s) Capsule 1 Capsule(s) Inhalation daily    MEDICATIONS  (PRN):  acetaminophen   Tablet .. 650 milliGRAM(s) Oral every 6 hours PRN Temp greater or equal to 38.5C (101.3F)  metoclopramide Injectable 10 milliGRAM(s) IV Push every 8 hours PRN Vomiting        Review of Systems:   · Additional ROS	Unobtainable due to clinical condition	    Physical Exam:   · Constitutional	detailed exam	  · Constitutional Details	ill; restless but poorly cooperative	  · ENMT	detailed exam	  · ENMT Comments	Trach in situ connected to Vent	  · Respiratory	detailed exam	  · Respiratory Details	breath sounds equal B/L; NO sig W/R/R	  · Cardiovascular	detailed exam	  · Cardiovascular Details	irregular rate and rhythm	  · Gastrointestinal	detailed exam	  · GI Normal	soft; NT/ND (+) BS	  · GI Abnormal	+ PEG in situ; Site is CDI	  · Extremities	detailed exam	  · Extremities Details	no cyanosis	  · Neurological	detailed exam	  · Neurological Details	Encephalopathy persists; minimally interactive; MARIE spont NO gross motor or sensory deficits	  · Skin	detailed exam	  · Skin Details	warm and dry	        DVT Prophylaxis: Apixaban; venodynes    Visit Information: Hospital # 36  ICU # 31  Vent # 23   Trach and PEG # 9    COVID-19 NEGATIVE     64 y/o M admitted on 4/18 with COPD exacerbation -- RRT called on 4/23 for AMS requiring intubation  extubated on 4/26 then re intubated on 4/29.    S/P Trach and PEG on 5/15 and has failed SBT.    Most recent active issues is persistent MSSA sepsis and CRP in sputum--on IV vanco and Cipro.  No central access      5/22:  remains intubated, encephalopathy.  (+) MSSA + in Bcx  5/23:  Tolerating limited PSV - persistent poor mentation.  Repeat BCx sent.  Abx changed to Ancef  5/24:  Rapid AFib O/N.  Still Encephalopathy.  COVID sent.  5/23 BCx still P.        above d/w Dr Dorado who has been caring for the patient.    5/25: cultures from 5/23 remain NGTD - COVID swab again negative on 5/24.    Hemodynamics reasonable.    Remains intubated, NOT sedated, mentation remains poor.  TF in progress.  On Abx.      PMHx - chronic AFib w Watchman device, CHF, COPD, HTN, obesity     Allergies    Ceclor (Rash)  Duricef (Rash)      ICU Vital Signs Last 24 Hrs  T(C): 37.1 (25 May 2020 06:00), Max: 37.2 (24 May 2020 10:00)  T(F): 98.8 (25 May 2020 06:00), Max: 99 (24 May 2020 10:00)  HR: 88 (25 May 2020 07:00) (78 - 142)  BP: 116/57 (25 May 2020 07:00) (100/71 - 141/83)  BP(mean): 70 (25 May 2020 07:00) (56 - 112)  RR: 23 (25 May 2020 07:00) (18 - 29)  SpO2: 100% (25 May 2020 07:00) (97% - 100%)      Mode: AC/ CMV (Assist Control/ Continuous Mandatory Ventilation)  RR (machine): 15  TV (machine): 450  FiO2: 35  PEEP: 5  ITime: 0.9  MAP: 15  PIP: 39      I&O's Summary    24 May 2020 07:01  -  25 May 2020 07:00  --------------------------------------------------------  IN: 3273 mL / OUT: 4150 mL / NET: -877 mL                              8.4    5.89  )-----------( 213      ( 25 May 2020 07:31 )             27.0       05-25    142  |  106  |  9   ----------------------------<  125<H>  3.7   |  32<H>  |  0.47<L>    Ca    8.5      25 May 2020 07:31  Phos  3.5     05-25  Mg     1.4     05-25          MEDICATIONS  (STANDING):  ALBUTerol    90 MICROgram(s) HFA Inhaler 2 Puff(s) Inhalation every 4 hours  ALPRAZolam 0.5 milliGRAM(s) Oral every 12 hours  apixaban 5 milliGRAM(s) Oral every 12 hours  aspirin  chewable 81 milliGRAM(s) Oral daily  budesonide 160 MICROgram(s)/formoterol 4.5 MICROgram(s) Inhaler 2 Puff(s) Inhalation two times a day  ceFAZolin   IVPB 2000 milliGRAM(s) IV Intermittent every 8 hours  chlorhexidine 0.12% Liquid 15 milliLiter(s) Oral Mucosa every 12 hours  ciprofloxacin   IVPB      ciprofloxacin   IVPB 400 milliGRAM(s) IV Intermittent every 12 hours  diltiazem    Tablet 90 milliGRAM(s) Oral every 6 hours  doxazosin 2 milliGRAM(s) Oral at bedtime  gabapentin 400 milliGRAM(s) Oral three times a day  pantoprazole  Injectable 40 milliGRAM(s) IV Push daily  polyethylene glycol 3350 17 Gram(s) Oral daily  thiamine 100 milliGRAM(s) Oral daily  tiotropium 18 MICROgram(s) Capsule 1 Capsule(s) Inhalation daily    MEDICATIONS  (PRN):  acetaminophen   Tablet .. 650 milliGRAM(s) Oral every 6 hours PRN Temp greater or equal to 38.5C (101.3F)  metoclopramide Injectable 10 milliGRAM(s) IV Push every 8 hours PRN Vomiting        Review of Systems:   · Additional ROS	Unobtainable due to clinical condition	    Physical Exam:   · Constitutional	detailed exam	  · Constitutional Details	ill; restless but poorly cooperative	  · ENMT	detailed exam	  · ENMT Comments	Trach in situ connected to Vent	  · Respiratory	detailed exam	  · Respiratory Details	breath sounds equal B/L; NO sig W/R/R	  · Cardiovascular	detailed exam	  · Cardiovascular Details	irregular rate and rhythm	  · Gastrointestinal	detailed exam	  · GI Normal	soft; NT/ND (+) BS	  · GI Abnormal	+ PEG in situ; Site is CDI	  · Extremities	detailed exam	  · Extremities Details	no cyanosis; LLE BKA	  · Neurological	detailed exam	  · Neurological Details	Encephalopathy persists; minimally interactive; MARIE spont NO gross motor or sensory deficits	  · Skin	detailed exam	  · Skin Details	warm and dry	        DVT Prophylaxis: Apixaban; venodynes    Visit Information:

## 2020-05-25 NOTE — PROGRESS NOTE ADULT - ASSESSMENT
66 y/o male with h/o chronic A.Fib with Watchman device, CHF, COPD, HTN, obesity was admitted on 4/18 for worsening SOB. In ER he was found with rapid A.fib and was placed on NRB. He tested COVID-19 PCR negative x 3. Noted with hypercapnea and placed on BiPAP, then intubated and placed on ventilatory support. He was extubated on 4/26. He is reported with MDRO's in sputum c/s. He was treated with azithromycin from 4/20 to 4/25.     1. Low grade fever. Persistent sepsis with MSSA. Right arm cellulitis/ phlebitis. Acute respiratory failure. 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 Allergy to PCN and cephalosporines. Possible COVID-19 syndrome.   -fever is down  -concerned about sepsis with MSSA ?source ?endocarditis  -encephalopathy   -cultures reviewed; repeat sputum c/s shows CRE-PSAE  -s/p cipro IV # 9  -s/p vancomycin 1 gm IV q12h # 10 days until 5/7  -respiratory care  -repeat BC x 2 collected this AM  -s/p vancomycin 1 gm IV q12h # 4 days  -on cefazolin 2 gm iV q8h # 4 and cipro IV # 4  -tolerating abx well so far; no side effects noted  -he had prior CRE-PSAE; new sputum culture shows PSAE again; CXR dose not show infiltrates  -f/u closely for rashes  -repeat BC are negative to date  -monitor closely in tod of duricef allergy history  -continue abx coverage  -cardiology evaluation appreciated; plan for MYA  -monitor temps  -f/u CBC  -supportive care  2. Other issues:   -care per medicine

## 2020-05-25 NOTE — PROGRESS NOTE ADULT - SUBJECTIVE AND OBJECTIVE BOX
Date of service: 05-25-20 @ 11:07    Lying in bed in NAD  On ventilatory support  Has low grade fever    ROS: unobtianable    MEDICATIONS  (STANDING):  ALBUTerol    90 MICROgram(s) HFA Inhaler 2 Puff(s) Inhalation every 4 hours  ALPRAZolam 0.5 milliGRAM(s) Oral every 12 hours  apixaban 5 milliGRAM(s) Oral every 12 hours  aspirin  chewable 81 milliGRAM(s) Oral daily  budesonide 160 MICROgram(s)/formoterol 4.5 MICROgram(s) Inhaler 2 Puff(s) Inhalation two times a day  ceFAZolin   IVPB 2000 milliGRAM(s) IV Intermittent every 8 hours  chlorhexidine 0.12% Liquid 15 milliLiter(s) Oral Mucosa every 12 hours  ciprofloxacin   IVPB      ciprofloxacin   IVPB 400 milliGRAM(s) IV Intermittent every 12 hours  diltiazem    Tablet 90 milliGRAM(s) Oral every 6 hours  doxazosin 2 milliGRAM(s) Oral at bedtime  gabapentin 400 milliGRAM(s) Oral three times a day  lactated ringers. 1000 milliLiter(s) (50 mL/Hr) IV Continuous <Continuous>  pantoprazole  Injectable 40 milliGRAM(s) IV Push daily  polyethylene glycol 3350 17 Gram(s) Oral daily  thiamine 100 milliGRAM(s) Oral daily  tiotropium 18 MICROgram(s) Capsule 1 Capsule(s) Inhalation daily      Vital Signs Last 24 Hrs  T(C): 37.1 (25 May 2020 06:00), Max: 37.2 (25 May 2020 01:00)  T(F): 98.8 (25 May 2020 06:00), Max: 99 (25 May 2020 01:00)  HR: 86 (25 May 2020 11:00) (78 - 142)  BP: 114/57 (25 May 2020 11:00) (100/71 - 138/75)  BP(mean): 68 (25 May 2020 11:00) (56 - 112)  RR: 29 (25 May 2020 11:00) (16 - 32)  SpO2: 96% (25 May 2020 11:00) (96% - 100%)    Mode: AC/ CMV (Assist Control/ Continuous Mandatory Ventilation), RR (machine): 15, TV (machine): 450, FiO2: 35, PEEP: 5, ITime: 0.9, MAP: 15, PIP: 39    Physical exam:    Constitutional:  No acute distress  HEENT: NC/AT, EOMI, PERRLA, conjunctivae clear  Neck: supple; thyroid not palpable  Back: no tenderness  Respiratory: respiratory effort normal; b/l rhonchi  Cardiovascular: S1S2 regular, no murmurs  Abdomen: soft, not tender, not distended, positive BS  Genitourinary: no suprapubic tenderness  Lymphatic: no LN palpable  Musculoskeletal: no muscle tenderness, no joint swelling or tenderness  Extremities: no pedal edema  Right arm area of erythema with phlebitis - resolving  Neurological/ Psychiatric: confused; moving all extremities  Skin: no rashes; no palpable lesions    Labs: reviewed                        8.4    5.89  )-----------( 213      ( 25 May 2020 07:31 )             27.0     05-25    142  |  106  |  9   ----------------------------<  125<H>  3.7   |  32<H>  |  0.47<L>    Ca    8.5      25 May 2020 07:31  Phos  3.5     05-25  Mg     1.4     05-25               Vancomycin Level, Trough: 9.8 ug/mL (05-21 @ 04:52)                                  12.5   8.31  )-----------( 197      ( 07 May 2020 08:15 )             37.6     05-07    137  |  100  |  33<H>  ----------------------------<  114<H>  4.9   |  31  |  0.83    Ca    8.5      07 May 2020 08:15  Phos  4.2     05-07  Mg     2.4     05-07      COVID-19 PCR . (04.26.20 @ 00:02)    COVID-19 PCR: NotDetec      Culture - Blood (collected 26 Apr 2020 01:01)  Source: .Blood None  Preliminary Report (27 Apr 2020 10:02):    No growth to date.    Culture - Blood (collected 26 Apr 2020 00:56)  Source: .Blood None  Preliminary Report (27 Apr 2020 10:02):    No growth to date.    Culture - Sputum (collected 25 Apr 2020 01:00)  Source: .Sputum Sputum  trap  Gram Stain (26 Apr 2020 12:29):    Moderate polymorphonuclear leukocytes per low power field    No Squamous epithelial cells per low power field    Rare Gram Positive Rods per oil power field    Rare Gram Positive Cocci in Pairs and Chains per oil power field  Final Report (28 Apr 2020 10:53):    Few Pseudomonas aeruginosa (Carbapenem Resistant)    Moderate Methicillin resistant Staphylococcus aureus    Normal Respiratory Faith present  Organism: Pseudomonas aeruginosa (Carbapenem Resistant)  Methicillin resistant Staphylococcus aureus (28 Apr 2020 10:44)  Organism: Pseudomonas aeruginosa (Carbapenem Resistant) (28 Apr 2020 10:44)      -  Amikacin: S <=16      -  Aztreonam: R >16      -  Cefepime: R >16      -  Ceftazidime: R >16      -  Ciprofloxacin: S <=0.25      -  Gentamicin: S 4      -  Imipenem: R >8      -  Levofloxacin: S <=0.5      -  Meropenem: R >8      -  Piperacillin/Tazobactam: R >64      -  Tobramycin: S <=2      Method Type: LADY  Organism: Methicillin resistant Staphylococcus aureus (28 Apr 2020 10:40)      -  Amoxicillin/Clavulanic Acid: R >4/2      -  Ampicillin: R >8      -  Ampicillin/Sulbactam: R <=8/4      -  Cefazolin: R >16      -  Ceftriaxone: R >32      -  Ciprofloxacin: R >2      -  Clindamycin: R >4      -  Daptomycin: S 0.5      -  Erythromycin: R >4      -  Gentamicin: S <=1 Should not be used as monotherapy      -  Levofloxacin: R >4      -  Linezolid: S 4      -  Meropenem: R >8      -  Moxifloxacin(Aerobic): R >4      -  Oxacillin: R >2      -  Penicillin: R >8      -  RIF- Rifampin: S <=1 Should not be used as monotherapy      -  Tetra/Doxy: S 2      -  Trimethoprim/Sulfamethoxazole: S <=0.5/9.5      -  Vancomycin: S 2      Method Type: LADY    Culture - Urine (collected 21 Apr 2020 15:51)  Source: .Urine Clean Catch (Midstream)  Final Report (22 Apr 2020 17:03):    <10,000 CFU/mL Normal Urogenital Faith    Culture - Blood (collected 18 Apr 2020 19:43)  Source: .Blood Blood-Peripheral  Final Report (24 Apr 2020 01:01):    No Growth Final    Culture - Blood (05.18.20 @ 11:44)    -  Staphylococcus aureus: Detec Any isolate of Staphylococcus aureus from a blood culture is NOT considered a contaminant.    -  RIF- Rifampin: S <=1 Should not be used as monotherapy    -  Penicillin: R >8    -  Oxacillin: S <=0.25    -  Tetra/Doxy: S <=1    -  Trimethoprim/Sulfamethoxazole: S <=0.5/9.5    -  Vancomycin: S 2    -  Clindamycin: R 0.5 This isolate is presumed to be clindamycin resistant based on detection of inducible resistance. Clindamycin may still be effective in some patients.    -  Gentamicin: S <=1 Should not be used as monotherapy    -  Erythromycin: R >4    -  Ampicillin/Sulbactam: S <=8/4    -  Cefazolin: S <=4    Gram Stain:   Growth in aerobic bottle: Gram Positive Cocci in Clusters  Growth in anaerobic bottle: Gram Positive Cocci in Clusters    Specimen Source: .Blood None    Organism: Staphylococcus aureus    Organism: Staphylococcus aureus    Culture Results:   Growth in aerobic and anaerobic bottles: Staphylococcus aureus    Organism Identification: Staphylococcus aureus  Staphylococcus aureus    Method Type: LADY    Method Type: PCR        Culture - Sputum . (05.18.20 @ 09:45)    -  Tobramycin: S <=2    -  Piperacillin/Tazobactam: R >64    -  Meropenem: R >8    -  Levofloxacin: S <=0.5    Gram Stain:   Numerous polymorphonuclear leukocytes per low power field  No Squamous epithelial cells per low power field  Numerous Gram Negative Rods per oil power field    -  Amikacin: S <=16    -  Cefepime: R >16    -  Aztreonam: R >16    -  Imipenem: R >8    -  Gentamicin: S 4    -  Ciprofloxacin: S <=0.25    -  Ceftazidime: R >16    Specimen Source: .Sputum Sputums trap    Culture Results:   Numerous Pseudomonas aeruginosa (Carbapenem Resistant)  Normal Respiratory Faith absent    Organism Identification: Pseudomonas aeruginosa (Carbapenem Resistant)    Organism: Pseudomonas aeruginosa (Carbapenem Resistant)    Method Type: LADY    Culture - Blood in AM (05.20.20 @ 06:41)    Gram Stain:   Growth in aerobic bottle: Gram Positive Cocci in Clusters  Growth in anaerobic bottle: Gram Positive Cocci in Clusters    Specimen Source: .Blood None    Culture Results:   Growth in aerobic and anaerobic bottles: Staphylococcus aureus  See previous culture [id number]    Culture - Blood (05.23.20 @ 07:07)    Specimen Source: .Blood None    Culture Results:   No growth to date.    COVID-19 PCR . (05.14.20 @ 08:10)    COVID-19 PCR: NotDetec    Radiology: all available radiological tests reviewed    < from: Xray Chest 1 View- PORTABLE-Urgent (05.18.20 @ 09:49) >  A tracheostomy tube is noted.  Lungs: There are no lung consolidations.  Heart: There is cardiomegaly.  Mediastinum: The mediastinum is within normal limits.    < end of copied text >    Advanced directives addressed: full resuscitation

## 2020-05-25 NOTE — PROGRESS NOTE ADULT - ASSESSMENT
66 y/o M admitted on 4/18 with COPD exacerbation -- RRT called on 4/23 for AMS requiring intubation  extubated on 4/26 then re intubated on 4/29.    S/P Trach and PEG on 5/15 and has failed SBT.    Most recent active issues is persistent MSSA sepsis and CRP in sputum--on IV vanco and Cipro.  No central access      pt continues to suffer from:  chronic resp failure requiring ventilator support  TM Encephalopathy   concern for endocarditis as cause of bacteremia with MSSA  COVID Negative on 5/24  Blood cultures 5/23: still NGTD  atrial fibrillation RVR - better controlled at present  urinary retention  S/p trach and PEG    continues to be at risk for deterioration, morbidity and mortality given current circumstances.    Plan at this time is for continued care in ICU  VAP prevention bundle  SBT as tolerated  IV vanco (5)--stopped on 5/22.  Ancef (4) and Cipro (5).    Xanax 0.5g q 12  Cont apixaban and ASA for CAD and AFib-- Dilt 90mg q6   TF to goal  HOB > 30  TF - NPO after midnight in prep for MYA 5/26; start IVF  Cardura for urinary retention  Supportive care  ICU care--d/w ICU staff on multi disciplinary rounds

## 2020-05-25 NOTE — PROGRESS NOTE ADULT - SUBJECTIVE AND OBJECTIVE BOX
CC: MRSA bacteremia    INTERVAL HPI:    Patient was seen and examined  Laying comfortably in bed. No medical complaints at this time.    Vital Signs Last 24 Hrs  T(C): 36.8 (19 Nov 2018 04:36), Max: 37.1 (18 Nov 2018 16:43)  T(F): 98.3 (19 Nov 2018 04:36), Max: 98.7 (18 Nov 2018 16:43)  HR: 63 (19 Nov 2018 04:36) (63 - 70)  BP: 111/60 (19 Nov 2018 04:36) (111/60 - 127/59)  BP(mean): --  RR: 18 (19 Nov 2018 04:36) (18 - 18)  SpO2: 98% (18 Nov 2018 21:29) (98% - 98%)    PHYSICAL EXAM:  GENERAL: No acute distress.  CHEST/LUNG: Clear to auscultation b/l. No rales, rhonchi or wheezing. non-tender, no crepitus  HEART: Regular rate and rhythm. Normal S1S2. No murmurs, gallops, or rubs.   ABDOMEN: Soft, nontender, nondistended. Normal bowel sounds in all 4 quadrants.   EXTREMITIES:  1+ radial and DP pulses noted, no clubbing, cyanosis, or edema noted;  FROM x 4. Shiny atrophic appearance on shins b/l with no hair. Necrotic appearing tissue on distal tip of the great toe. Multiple scabbed areas to multiple digits of the foot and ankles bilaterally.  SKIN: + multiple tattoos throughout; scattered skin abrasions from fall at home that have scabbing in different stages.  PSYCH: insight/judgement unable to assess.    LABS:     11-19    137  |  104  |  31.0<H>  ----------------------------<  122<H>  3.9   |  21.0<L>  |  1.81<H>    Ca    8.1<L>      19 Nov 2018 07:52    MEDICATIONS  (STANDING):  aspirin  chewable 81 milliGRAM(s) Oral daily  atorvastatin 20 milliGRAM(s) Oral at bedtime  chlorhexidine 2% Cloths 1 Application(s) Topical <User Schedule>  ciprofloxacin     Tablet 250 milliGRAM(s) Oral every 12 hours  clopidogrel Tablet 75 milliGRAM(s) Oral daily  DAPTOmycin IVPB 550 milliGRAM(s) IV Intermittent every 24 hours  dextrose 5%. 1000 milliLiter(s) (100 mL/Hr) IV Continuous <Continuous>  dextrose 5%. 1000 milliLiter(s) (50 mL/Hr) IV Continuous <Continuous>  dextrose 50% Injectable 12.5 Gram(s) IV Push once  dextrose 50% Injectable 25 Gram(s) IV Push once  dextrose 50% Injectable 25 Gram(s) IV Push once  docusate sodium 100 milliGRAM(s) Oral three times a day  heparin  Injectable 5000 Unit(s) SubCutaneous every 12 hours  hydrALAZINE 50 milliGRAM(s) Oral every 8 hours  insulin glargine Injectable (LANTUS) 28 Unit(s) SubCutaneous at bedtime  insulin lispro (HumaLOG) corrective regimen sliding scale   SubCutaneous three times a day before meals  metoprolol tartrate 50 milliGRAM(s) Oral two times a day  saccharomyces boulardii 250 milliGRAM(s) Oral two times a day  sertraline 100 milliGRAM(s) Oral daily    MEDICATIONS  (PRN):  dextrose 40% Gel 15 Gram(s) Oral once PRN Blood Glucose LESS THAN 70 milliGRAM(s)/deciliter  glucagon  Injectable 1 milliGRAM(s) IntraMuscular once PRN Glucose LESS THAN 70 milligrams/deciliter  polyethylene glycol 3350 17 Gram(s) Oral daily PRN Constipation    < from: Xray Chest 1 View-PORTABLE IMMEDIATE (11.16.18 @ 17:19) >  IMPRESSION: Tiny right apical pneumothorax.    < end of copied text > REASON FOR VISIT: ICU team requested a MYA    HPI:  65 year old man with a history of chronic AF, Watchman device (implanted 4/2019), HTN, CVAs, alcoholism, tobacco abuse, COPD, HFpEF, GERD, cirrhosis, left BKA following traumatic injury admitted on 4/18/2020 with  acute on chronic hypercapnic & hypoxemic respiratory failure with hospitalization complicated by encephalopathy, ventilator-dependence, and persistent MRSA bacteremia --critical care team and ID consultant request MYA to assess for vegetation.    5/22/2020:  Awake but encephalopathic.  5/23/2020:  Nods that he understands me but seems to lose attention quickly; denies pain.  5/24/20: comfortable on vent.  5/25/20: MYA planned for caitlin on vent. no new issues.        MEDICATIONS  (STANDING):  ALBUTerol    90 MICROgram(s) HFA Inhaler 2 Puff(s) Inhalation every 4 hours  ALPRAZolam 0.5 milliGRAM(s) Oral every 12 hours  apixaban 5 milliGRAM(s) Oral every 12 hours  aspirin  chewable 81 milliGRAM(s) Oral daily  budesonide 160 MICROgram(s)/formoterol 4.5 MICROgram(s) Inhaler 2 Puff(s) Inhalation two times a day  ceFAZolin   IVPB 2000 milliGRAM(s) IV Intermittent every 8 hours  chlorhexidine 0.12% Liquid 15 milliLiter(s) Oral Mucosa every 12 hours  ciprofloxacin   IVPB      ciprofloxacin   IVPB 400 milliGRAM(s) IV Intermittent every 12 hours  diltiazem    Tablet 90 milliGRAM(s) Oral every 6 hours  doxazosin 2 milliGRAM(s) Oral at bedtime  gabapentin 400 milliGRAM(s) Oral three times a day  lactated ringers. 1000 milliLiter(s) (50 mL/Hr) IV Continuous <Continuous>  pantoprazole  Injectable 40 milliGRAM(s) IV Push daily  polyethylene glycol 3350 17 Gram(s) Oral daily  thiamine 100 milliGRAM(s) Oral daily  tiotropium 18 MICROgram(s) Capsule 1 Capsule(s) Inhalation daily    MEDICATIONS  (PRN):  acetaminophen   Tablet .. 650 milliGRAM(s) Oral every 6 hours PRN Temp greater or equal to 38.5C (101.3F)  metoclopramide Injectable 10 milliGRAM(s) IV Push every 8 hours PRN Vomiting      Vital Signs Last 24 Hrs  T(C): 36.2 (25 May 2020 12:00), Max: 37.2 (25 May 2020 01:00)  T(F): 97.1 (25 May 2020 12:00), Max: 99 (25 May 2020 01:00)  HR: 118 (25 May 2020 12:00) (78 - 121)  BP: 133/73 (25 May 2020 12:00) (100/71 - 138/64)  BP(mean): 89 (25 May 2020 12:00) (56 - 100)  RR: 24 (25 May 2020 12:00) (16 - 32)  SpO2: 98% (25 May 2020 12:00) (96% - 100%)    I&O's Detail    24 May 2020 07:01  -  25 May 2020 07:00  --------------------------------------------------------  IN:    Enteral Tube Flush: 1111 mL    IV PiggyBack: 500 mL    ns in tub fed  [id number]: 1662 mL  Total IN: 3273 mL    OUT:    Rectal Tube: 150 mL    Voided: 4000 mL  Total OUT: 4150 mL    Total NET: -877 mL      25 May 2020 07:01  -  25 May 2020 13:58  --------------------------------------------------------  IN:  Total IN: 0 mL    OUT:    Voided: 600 mL  Total OUT: 600 mL    Total NET: -600 mL          Daily     Daily     PHYSICAL EXAM:  Constitutional: Awake, looking around room, no distress  HEENT: Trach to vent  Respiratory: Lungs clear  Cardiovascular: Irregular, tachycardic  Gastrointestinal: Abdomen is soft, PEG   Extremities:  L BKA; right pedal edema     LABS:                                    8.4    5.89  )-----------( 213      ( 25 May 2020 07:31 )             27.0     05-25    142  |  106  |  9   ----------------------------<  125<H>  3.7   |  32<H>  |  0.47<L>    Ca    8.5      25 May 2020 07:31  Phos  3.5     05-25  Mg     1.4     05-25    05-10 Chol -- LDL -- HDL -- Trig 106      Transthoracic Echocardiogram Follow Up (04.21.20 @ 09:10):   Moderate (2+) mitral regurgitation.   Moderate (2+) tricuspid valve regurgitation.   The left atrium is moderately dilated.   The left ventricle cavity is moderately dilated.   Normal left ventricular systolic function. Mild concentric left ventricular hypertrophy is present. Estimated left ventricular ejection fraction is 60-65 %.   The right atrium appears moderately dilated.   Normal appearing right ventricle structure and function.     TTE Echo Complete w/o contrast w/ Doppler (05.19.20 @ 11:14):   The left ventricle is normal in size, wall motion and contractility. Mild concentric left ventricular hypertrophy is present. Estimated left ventricular ejection fraction is 55-60 %.   The left atrium is moderately dilated.   The right atrium appears moderately dilated.   Normal appearing right ventricle structure and function.   Moderate (2+) mitral regurgitation is present.   Mild to moderate Tricuspid regurgitation is present.   Trace pulmonic valvular regurgitation is present.   No evidence of pericardial effusion.   Pleural effusion cannot be ruled out.    Tele: AF    Culture - Blood in AM (05.20.20 @ 06:41):  Growth in aerobic bottle: Gram Positive Cocci in Clusters.   Staphylococcus aureus CC: MRSA bacteremia    INTERVAL HPI:  Patient was seen and examined  Laying comfortably in bed. No medical complaints at this time.    Vital Signs Last 24 Hrs  T(C): 36.8 (19 Nov 2018 04:36), Max: 37.1 (18 Nov 2018 16:43)  T(F): 98.3 (19 Nov 2018 04:36), Max: 98.7 (18 Nov 2018 16:43)  HR: 63 (19 Nov 2018 04:36) (63 - 70)  BP: 111/60 (19 Nov 2018 04:36) (111/60 - 127/59)  BP(mean): --  RR: 18 (19 Nov 2018 04:36) (18 - 18)  SpO2: 98% (18 Nov 2018 21:29) (98% - 98%)    PHYSICAL EXAM:  GENERAL: No acute distress.  CHEST/LUNG: Clear to auscultation b/l. No rales, rhonchi or wheezing. non-tender, no crepitus  HEART: Regular rate and rhythm. Normal S1S2. No murmurs, gallops, or rubs.   ABDOMEN: Soft, nontender, nondistended. Normal bowel sounds in all 4 quadrants.   EXTREMITIES:  1+ radial and DP pulses noted, no clubbing, cyanosis, or edema noted;  FROM x 4. Shiny atrophic appearance on shins b/l with no hair. Necrotic appearing tissue on distal tip of the great toe. Multiple scabbed areas to multiple digits of the foot and ankles bilaterally.  SKIN: + multiple tattoos throughout; scattered skin abrasions from fall at home that have scabbing in different stages.  PSYCH: insight/judgement unable to assess.    LABS:     11-19    137  |  104  |  31.0<H>  ----------------------------<  122<H>  3.9   |  21.0<L>  |  1.81<H>    Ca    8.1<L>      19 Nov 2018 07:52    MEDICATIONS  (STANDING):  aspirin  chewable 81 milliGRAM(s) Oral daily  atorvastatin 20 milliGRAM(s) Oral at bedtime  chlorhexidine 2% Cloths 1 Application(s) Topical <User Schedule>  ciprofloxacin     Tablet 250 milliGRAM(s) Oral every 12 hours  clopidogrel Tablet 75 milliGRAM(s) Oral daily  DAPTOmycin IVPB 550 milliGRAM(s) IV Intermittent every 24 hours  dextrose 5%. 1000 milliLiter(s) (100 mL/Hr) IV Continuous <Continuous>  dextrose 5%. 1000 milliLiter(s) (50 mL/Hr) IV Continuous <Continuous>  dextrose 50% Injectable 12.5 Gram(s) IV Push once  dextrose 50% Injectable 25 Gram(s) IV Push once  dextrose 50% Injectable 25 Gram(s) IV Push once  docusate sodium 100 milliGRAM(s) Oral three times a day  heparin  Injectable 5000 Unit(s) SubCutaneous every 12 hours  hydrALAZINE 50 milliGRAM(s) Oral every 8 hours  insulin glargine Injectable (LANTUS) 28 Unit(s) SubCutaneous at bedtime  insulin lispro (HumaLOG) corrective regimen sliding scale   SubCutaneous three times a day before meals  metoprolol tartrate 50 milliGRAM(s) Oral two times a day  saccharomyces boulardii 250 milliGRAM(s) Oral two times a day  sertraline 100 milliGRAM(s) Oral daily    MEDICATIONS  (PRN):  dextrose 40% Gel 15 Gram(s) Oral once PRN Blood Glucose LESS THAN 70 milliGRAM(s)/deciliter  glucagon  Injectable 1 milliGRAM(s) IntraMuscular once PRN Glucose LESS THAN 70 milligrams/deciliter  polyethylene glycol 3350 17 Gram(s) Oral daily PRN Constipation    < from: Xray Chest 1 View-PORTABLE IMMEDIATE (11.16.18 @ 17:19) >  IMPRESSION: Tiny right apical pneumothorax.    < end of copied text >

## 2020-05-26 LAB
ANION GAP SERPL CALC-SCNC: 3 MMOL/L — LOW (ref 5–17)
BUN SERPL-MCNC: 10 MG/DL — SIGNIFICANT CHANGE UP (ref 7–23)
CALCIUM SERPL-MCNC: 8.6 MG/DL — SIGNIFICANT CHANGE UP (ref 8.5–10.1)
CHLORIDE SERPL-SCNC: 103 MMOL/L — SIGNIFICANT CHANGE UP (ref 96–108)
CO2 SERPL-SCNC: 31 MMOL/L — SIGNIFICANT CHANGE UP (ref 22–31)
CREAT SERPL-MCNC: 0.46 MG/DL — LOW (ref 0.5–1.3)
GLUCOSE SERPL-MCNC: 94 MG/DL — SIGNIFICANT CHANGE UP (ref 70–99)
HCT VFR BLD CALC: 28.2 % — LOW (ref 39–50)
HGB BLD-MCNC: 8.9 G/DL — LOW (ref 13–17)
MAGNESIUM SERPL-MCNC: 1.3 MG/DL — LOW (ref 1.6–2.6)
MCHC RBC-ENTMCNC: 31.4 PG — SIGNIFICANT CHANGE UP (ref 27–34)
MCHC RBC-ENTMCNC: 31.6 GM/DL — LOW (ref 32–36)
MCV RBC AUTO: 99.6 FL — SIGNIFICANT CHANGE UP (ref 80–100)
PHOSPHATE SERPL-MCNC: 3.6 MG/DL — SIGNIFICANT CHANGE UP (ref 2.5–4.5)
PLATELET # BLD AUTO: 254 K/UL — SIGNIFICANT CHANGE UP (ref 150–400)
POTASSIUM SERPL-MCNC: 4 MMOL/L — SIGNIFICANT CHANGE UP (ref 3.5–5.3)
POTASSIUM SERPL-SCNC: 4 MMOL/L — SIGNIFICANT CHANGE UP (ref 3.5–5.3)
RBC # BLD: 2.83 M/UL — LOW (ref 4.2–5.8)
RBC # FLD: 13.2 % — SIGNIFICANT CHANGE UP (ref 10.3–14.5)
SODIUM SERPL-SCNC: 137 MMOL/L — SIGNIFICANT CHANGE UP (ref 135–145)
WBC # BLD: 7.13 K/UL — SIGNIFICANT CHANGE UP (ref 3.8–10.5)
WBC # FLD AUTO: 7.13 K/UL — SIGNIFICANT CHANGE UP (ref 3.8–10.5)

## 2020-05-26 PROCEDURE — 99232 SBSQ HOSP IP/OBS MODERATE 35: CPT | Mod: 25

## 2020-05-26 PROCEDURE — 99233 SBSQ HOSP IP/OBS HIGH 50: CPT

## 2020-05-26 PROCEDURE — 99232 SBSQ HOSP IP/OBS MODERATE 35: CPT

## 2020-05-26 PROCEDURE — 93325 DOPPLER ECHO COLOR FLOW MAPG: CPT | Mod: 26

## 2020-05-26 PROCEDURE — 93320 DOPPLER ECHO COMPLETE: CPT | Mod: 26

## 2020-05-26 PROCEDURE — 93312 ECHO TRANSESOPHAGEAL: CPT | Mod: 26

## 2020-05-26 RX ORDER — QUETIAPINE FUMARATE 200 MG/1
50 TABLET, FILM COATED ORAL EVERY 12 HOURS
Refills: 0 | Status: DISCONTINUED | OUTPATIENT
Start: 2020-05-26 | End: 2020-05-31

## 2020-05-26 RX ORDER — MAGNESIUM SULFATE 500 MG/ML
2 VIAL (ML) INJECTION
Refills: 0 | Status: COMPLETED | OUTPATIENT
Start: 2020-05-26 | End: 2020-05-26

## 2020-05-26 RX ORDER — ENOXAPARIN SODIUM 100 MG/ML
40 INJECTION SUBCUTANEOUS EVERY 12 HOURS
Refills: 0 | Status: DISCONTINUED | OUTPATIENT
Start: 2020-05-26 | End: 2020-07-08

## 2020-05-26 RX ADMIN — SODIUM CHLORIDE 50 MILLILITER(S): 9 INJECTION, SOLUTION INTRAVENOUS at 00:10

## 2020-05-26 RX ADMIN — APIXABAN 5 MILLIGRAM(S): 2.5 TABLET, FILM COATED ORAL at 07:42

## 2020-05-26 RX ADMIN — ENOXAPARIN SODIUM 40 MILLIGRAM(S): 100 INJECTION SUBCUTANEOUS at 17:52

## 2020-05-26 RX ADMIN — Medication 2 MILLIGRAM(S): at 22:07

## 2020-05-26 RX ADMIN — ALBUTEROL 2 PUFF(S): 90 AEROSOL, METERED ORAL at 08:29

## 2020-05-26 RX ADMIN — Medication 200 MILLIGRAM(S): at 17:53

## 2020-05-26 RX ADMIN — GABAPENTIN 400 MILLIGRAM(S): 400 CAPSULE ORAL at 13:25

## 2020-05-26 RX ADMIN — Medication 200 MILLIGRAM(S): at 05:32

## 2020-05-26 RX ADMIN — Medication 0.5 MILLIGRAM(S): at 17:52

## 2020-05-26 RX ADMIN — OXYCODONE HYDROCHLORIDE 5 MILLIGRAM(S): 5 TABLET ORAL at 06:57

## 2020-05-26 RX ADMIN — Medication 90 MILLIGRAM(S): at 17:52

## 2020-05-26 RX ADMIN — Medication 90 MILLIGRAM(S): at 23:02

## 2020-05-26 RX ADMIN — Medication 90 MILLIGRAM(S): at 00:08

## 2020-05-26 RX ADMIN — Medication 0.5 MILLIGRAM(S): at 05:32

## 2020-05-26 RX ADMIN — GABAPENTIN 400 MILLIGRAM(S): 400 CAPSULE ORAL at 22:08

## 2020-05-26 RX ADMIN — Medication 100 MILLIGRAM(S): at 05:33

## 2020-05-26 RX ADMIN — Medication 81 MILLIGRAM(S): at 11:05

## 2020-05-26 RX ADMIN — Medication 100 MILLIGRAM(S): at 22:07

## 2020-05-26 RX ADMIN — Medication 100 MILLIGRAM(S): at 13:28

## 2020-05-26 RX ADMIN — Medication 90 MILLIGRAM(S): at 11:05

## 2020-05-26 RX ADMIN — CHLORHEXIDINE GLUCONATE 15 MILLILITER(S): 213 SOLUTION TOPICAL at 17:53

## 2020-05-26 RX ADMIN — Medication 100 MILLIGRAM(S): at 11:06

## 2020-05-26 RX ADMIN — QUETIAPINE FUMARATE 50 MILLIGRAM(S): 200 TABLET, FILM COATED ORAL at 13:24

## 2020-05-26 RX ADMIN — Medication 90 MILLIGRAM(S): at 05:33

## 2020-05-26 RX ADMIN — ALBUTEROL 2 PUFF(S): 90 AEROSOL, METERED ORAL at 16:49

## 2020-05-26 RX ADMIN — Medication 50 GRAM(S): at 10:57

## 2020-05-26 RX ADMIN — GABAPENTIN 400 MILLIGRAM(S): 400 CAPSULE ORAL at 05:32

## 2020-05-26 RX ADMIN — OXYCODONE HYDROCHLORIDE 5 MILLIGRAM(S): 5 TABLET ORAL at 20:36

## 2020-05-26 RX ADMIN — Medication 50 GRAM(S): at 09:46

## 2020-05-26 RX ADMIN — CHLORHEXIDINE GLUCONATE 15 MILLILITER(S): 213 SOLUTION TOPICAL at 05:34

## 2020-05-26 RX ADMIN — QUETIAPINE FUMARATE 50 MILLIGRAM(S): 200 TABLET, FILM COATED ORAL at 22:08

## 2020-05-26 RX ADMIN — PANTOPRAZOLE SODIUM 40 MILLIGRAM(S): 20 TABLET, DELAYED RELEASE ORAL at 11:05

## 2020-05-26 NOTE — PROCEDURAL SAFETY CHECKLIST WITH OR WITHOUT SEDATION - NSPOSTCOMMENTFT_GEN_ALL_CORE
MYA probe inserted without difficulty by Dr. Middleton at 1300; bubble study completed at 1310; study completed and MYA probe removed at 1314; darryl. well.

## 2020-05-26 NOTE — PROGRESS NOTE ADULT - PROBLEM SELECTOR PLAN 2
Rate control improved.  Watchman in place.  On eliquis 5 mg po BID for unclear reasons - will d/w ICU team. HRs in 130s today resting.  Consider increasing diltiazem to 120 mg q6 hrs. Watchman in place.  On eliquis 5 mg po BID for unclear reasons - no h/o PE or DVT, not needed for afib given watchman.  To be discussed during ICU rounds today.

## 2020-05-26 NOTE — PROGRESS NOTE ADULT - ASSESSMENT
Preliminary Transesophageal Echocardiography Report    Indication: persistent MSSA bacteremia, r/o endocarditis.    Findings:  No evidence of endocarditis on cardiac valves, structures or left atrial appendage occlusion device (Watchman device.  Normal LV Systolic function, no regional wall motion abnormalities.  Moderate mitral regurgitation and tricuspid regurgitation.  No PFO/ASD on 2D imaging, color doppler imaging or with injection w/ agitated saline.    Full report to follow.

## 2020-05-26 NOTE — PROGRESS NOTE ADULT - SUBJECTIVE AND OBJECTIVE BOX
Date of service: 05-26-20 @ 10:17    Lying in bed in NAD  On ventilatory support  No further fever    ROS: no fever or chills; unobtainable    MEDICATIONS  (STANDING):  ALBUTerol    90 MICROgram(s) HFA Inhaler 2 Puff(s) Inhalation every 4 hours  ALPRAZolam 0.5 milliGRAM(s) Oral every 12 hours  aspirin  chewable 81 milliGRAM(s) Oral daily  budesonide 160 MICROgram(s)/formoterol 4.5 MICROgram(s) Inhaler 2 Puff(s) Inhalation two times a day  ceFAZolin   IVPB 2000 milliGRAM(s) IV Intermittent every 8 hours  chlorhexidine 0.12% Liquid 15 milliLiter(s) Oral Mucosa every 12 hours  ciprofloxacin   IVPB      ciprofloxacin   IVPB 400 milliGRAM(s) IV Intermittent every 12 hours  diltiazem    Tablet 90 milliGRAM(s) Oral every 6 hours  doxazosin 2 milliGRAM(s) Oral at bedtime  gabapentin 400 milliGRAM(s) Oral three times a day  lactated ringers. 1000 milliLiter(s) (50 mL/Hr) IV Continuous <Continuous>  magnesium sulfate  IVPB 2 Gram(s) IV Intermittent every 2 hours  pantoprazole  Injectable 40 milliGRAM(s) IV Push daily  thiamine 100 milliGRAM(s) Oral daily  tiotropium 18 MICROgram(s) Capsule 1 Capsule(s) Inhalation daily      Vital Signs Last 24 Hrs  T(C): 37.1 (26 May 2020 08:00), Max: 37.1 (26 May 2020 08:00)  T(F): 98.8 (26 May 2020 08:00), Max: 98.8 (26 May 2020 08:00)  HR: 108 (26 May 2020 09:09) (71 - 138)  BP: 119/69 (26 May 2020 09:00) (94/76 - 157/88)  BP(mean): 80 (26 May 2020 09:00) (59 - 98)  RR: 15 (26 May 2020 09:00) (0 - 29)  SpO2: 100% (26 May 2020 09:09) (94% - 100%)    Mode: AC/ CMV (Assist Control/ Continuous Mandatory Ventilation), RR (machine): 15, TV (machine): 450, FiO2: 35, PEEP: 5, ITime: 1, MAP: 12, PIP: 32    Physical exam:    Constitutional:  No acute distress  HEENT: NC/AT, EOMI, PERRLA, conjunctivae clear  Neck: supple; thyroid not palpable  Back: no tenderness  Respiratory: respiratory effort normal; b/l rhonchi  Cardiovascular: S1S2 regular, no murmurs  Abdomen: soft, not tender, not distended, positive BS  Genitourinary: no suprapubic tenderness  Lymphatic: no LN palpable  Musculoskeletal: no muscle tenderness, no joint swelling or tenderness  Extremities: no pedal edema  Right arm area of erythema with phlebitis - resolving  Neurological/ Psychiatric: confused; moving all extremities  Skin: no rashes; no palpable lesions    Labs: reviewed                        8.9    7.13  )-----------( 254      ( 26 May 2020 07:06 )             28.2     05-26    137  |  103  |  10  ----------------------------<  94  4.0   |  31  |  0.46<L>    Ca    8.6      26 May 2020 07:06  Phos  3.6     05-26  Mg     1.3     05-26                        8.4    5.89  )-----------( 213      ( 25 May 2020 07:31 )             27.0     05-25    142  |  106  |  9   ----------------------------<  125<H>  3.7   |  32<H>  |  0.47<L>    Ca    8.5      25 May 2020 07:31  Phos  3.5     05-25  Mg     1.4     05-25               Vancomycin Level, Trough: 9.8 ug/mL (05-21 @ 04:52)                                  12.5   8.31  )-----------( 197      ( 07 May 2020 08:15 )             37.6     05-07    137  |  100  |  33<H>  ----------------------------<  114<H>  4.9   |  31  |  0.83    Ca    8.5      07 May 2020 08:15  Phos  4.2     05-07  Mg     2.4     05-07      COVID-19 PCR . (04.26.20 @ 00:02)    COVID-19 PCR: NotDetec      Culture - Blood (collected 26 Apr 2020 01:01)  Source: .Blood None  Preliminary Report (27 Apr 2020 10:02):    No growth to date.    Culture - Blood (collected 26 Apr 2020 00:56)  Source: .Blood None  Preliminary Report (27 Apr 2020 10:02):    No growth to date.    Culture - Sputum (collected 25 Apr 2020 01:00)  Source: .Sputum Sputum  trap  Gram Stain (26 Apr 2020 12:29):    Moderate polymorphonuclear leukocytes per low power field    No Squamous epithelial cells per low power field    Rare Gram Positive Rods per oil power field    Rare Gram Positive Cocci in Pairs and Chains per oil power field  Final Report (28 Apr 2020 10:53):    Few Pseudomonas aeruginosa (Carbapenem Resistant)    Moderate Methicillin resistant Staphylococcus aureus    Normal Respiratory Faith present  Organism: Pseudomonas aeruginosa (Carbapenem Resistant)  Methicillin resistant Staphylococcus aureus (28 Apr 2020 10:44)  Organism: Pseudomonas aeruginosa (Carbapenem Resistant) (28 Apr 2020 10:44)      -  Amikacin: S <=16      -  Aztreonam: R >16      -  Cefepime: R >16      -  Ceftazidime: R >16      -  Ciprofloxacin: S <=0.25      -  Gentamicin: S 4      -  Imipenem: R >8      -  Levofloxacin: S <=0.5      -  Meropenem: R >8      -  Piperacillin/Tazobactam: R >64      -  Tobramycin: S <=2      Method Type: LADY  Organism: Methicillin resistant Staphylococcus aureus (28 Apr 2020 10:40)      -  Amoxicillin/Clavulanic Acid: R >4/2      -  Ampicillin: R >8      -  Ampicillin/Sulbactam: R <=8/4      -  Cefazolin: R >16      -  Ceftriaxone: R >32      -  Ciprofloxacin: R >2      -  Clindamycin: R >4      -  Daptomycin: S 0.5      -  Erythromycin: R >4      -  Gentamicin: S <=1 Should not be used as monotherapy      -  Levofloxacin: R >4      -  Linezolid: S 4      -  Meropenem: R >8      -  Moxifloxacin(Aerobic): R >4      -  Oxacillin: R >2      -  Penicillin: R >8      -  RIF- Rifampin: S <=1 Should not be used as monotherapy      -  Tetra/Doxy: S 2      -  Trimethoprim/Sulfamethoxazole: S <=0.5/9.5      -  Vancomycin: S 2      Method Type: LADY    Culture - Urine (collected 21 Apr 2020 15:51)  Source: .Urine Clean Catch (Midstream)  Final Report (22 Apr 2020 17:03):    <10,000 CFU/mL Normal Urogenital Faith    Culture - Blood (collected 18 Apr 2020 19:43)  Source: .Blood Blood-Peripheral  Final Report (24 Apr 2020 01:01):    No Growth Final    Culture - Blood (05.18.20 @ 11:44)    -  Staphylococcus aureus: Detec Any isolate of Staphylococcus aureus from a blood culture is NOT considered a contaminant.    -  RIF- Rifampin: S <=1 Should not be used as monotherapy    -  Penicillin: R >8    -  Oxacillin: S <=0.25    -  Tetra/Doxy: S <=1    -  Trimethoprim/Sulfamethoxazole: S <=0.5/9.5    -  Vancomycin: S 2    -  Clindamycin: R 0.5 This isolate is presumed to be clindamycin resistant based on detection of inducible resistance. Clindamycin may still be effective in some patients.    -  Gentamicin: S <=1 Should not be used as monotherapy    -  Erythromycin: R >4    -  Ampicillin/Sulbactam: S <=8/4    -  Cefazolin: S <=4    Gram Stain:   Growth in aerobic bottle: Gram Positive Cocci in Clusters  Growth in anaerobic bottle: Gram Positive Cocci in Clusters    Specimen Source: .Blood None    Organism: Staphylococcus aureus    Organism: Staphylococcus aureus    Culture Results:   Growth in aerobic and anaerobic bottles: Staphylococcus aureus    Organism Identification: Staphylococcus aureus  Staphylococcus aureus    Method Type: LADY    Method Type: PCR        Culture - Sputum . (05.18.20 @ 09:45)    -  Tobramycin: S <=2    -  Piperacillin/Tazobactam: R >64    -  Meropenem: R >8    -  Levofloxacin: S <=0.5    Gram Stain:   Numerous polymorphonuclear leukocytes per low power field  No Squamous epithelial cells per low power field  Numerous Gram Negative Rods per oil power field    -  Amikacin: S <=16    -  Cefepime: R >16    -  Aztreonam: R >16    -  Imipenem: R >8    -  Gentamicin: S 4    -  Ciprofloxacin: S <=0.25    -  Ceftazidime: R >16    Specimen Source: .Sputum Sputums trap    Culture Results:   Numerous Pseudomonas aeruginosa (Carbapenem Resistant)  Normal Respiratory Faith absent    Organism Identification: Pseudomonas aeruginosa (Carbapenem Resistant)    Organism: Pseudomonas aeruginosa (Carbapenem Resistant)    Method Type: LADY    Culture - Blood in AM (05.20.20 @ 06:41)    Gram Stain:   Growth in aerobic bottle: Gram Positive Cocci in Clusters  Growth in anaerobic bottle: Gram Positive Cocci in Clusters    Specimen Source: .Blood None    Culture Results:   Growth in aerobic and anaerobic bottles: Staphylococcus aureus  See previous culture [id number]    Culture - Blood (05.23.20 @ 07:07)    Specimen Source: .Blood None    Culture Results:   No growth to date.    COVID-19 PCR . (05.14.20 @ 08:10)    COVID-19 PCR: NotDetec    Radiology: all available radiological tests reviewed    < from: Xray Chest 1 View- PORTABLE-Urgent (05.18.20 @ 09:49) >  A tracheostomy tube is noted.  Lungs: There are no lung consolidations.  Heart: There is cardiomegaly.  Mediastinum: The mediastinum is within normal limits.    < end of copied text >    Advanced directives addressed: full resuscitation

## 2020-05-26 NOTE — PROGRESS NOTE ADULT - ASSESSMENT
66 y/o M admitted on 4/18 with COPD exacerbation -- RRT called on 4/23 for AMS requiring intubation  extubated on 4/26 then re intubated on 4/29.    S/P Trach and PEG on 5/15 and has failed SBT.    Most recent active issues is persistent MSSA sepsis and CRP in sputum--on IV vanco and Cipro.  No central access      pt continues to suffer from:  chronic resp failure requiring ventilator support  TM Encephalopathy   concern for endocarditis as cause of bacteremia with MSSA  COVID Negative on 5/24  Blood cultures 5/23: still NGTD  atrial fibrillation RVR - better controlled at present  urinary retention  S/p trach and PEG    continues to be at risk for deterioration, morbidity and mortality given current circumstances.    Plan at this time is for continued care in ICU  VAP prevention bundle  SBT as tolerated  IV vanco (5)--stopped on 5/22.  Ancef (5) and Cipro (6).    Xanax 0.5g q 12  Cont ASA for CAD and AFib-- Dilt 90mg q6   stop Apixiban - start chemical VTE prophylaxis Lovenox 40mg SC q12h based on BMI  TF - resume post TTE  HOB > 30  Cardura for urinary retention  Supportive care  ICU care--d/w ICU staff on multi disciplinary rounds      Attending Attestation:   40 minutes spent on total encounter; more than 50% of the visit was spent counseling and/or coordinating care by the attending physician. 66 y/o M admitted on 4/18 with COPD exacerbation -- RRT called on 4/23 for AMS requiring intubation  extubated on 4/26 then re intubated on 4/29.    S/P Trach and PEG on 5/15 and has failed SBT.    Most recent active issues is persistent MSSA sepsis and CRP in sputum--on IV vanco and Cipro.  No central access      pt continues to suffer from:  chronic resp failure requiring ventilator support  TM Encephalopathy   concern for endocarditis as cause of bacteremia with MSSA  COVID Negative on 5/24  Blood cultures 5/23: still NGTD  atrial fibrillation RVR - better controlled at present  urinary retention  S/p trach and PEG    PMHx - chronic AFib w Watchman device, CHF, COPD, HTN, obesity     continues to be at risk for deterioration, morbidity and mortality given current circumstances.    Plan at this time is for continued care in ICU  VAP prevention bundle  SBT as tolerated  IV vanco (5)--stopped on 5/22.  Ancef (5) and Cipro (6).    Xanax 0.5g q 12  Cont ASA for CAD and AFib-- Dilt 90mg q6   stop Apixiban - start chemical VTE prophylaxis Lovenox 40mg SC q12h based on BMI  TF - resume post TTE  HOB > 30  Cardura for urinary retention  Supportive care  ICU care--d/w ICU staff on multi disciplinary rounds      Attending Attestation:   40 minutes spent on total encounter; more than 50% of the visit was spent counseling and/or coordinating care by the attending physician.

## 2020-05-26 NOTE — PROGRESS NOTE ADULT - ASSESSMENT
PROBLEMS;    s/p staph bactermia/sputum pseudomonas/staph  Ac on chronic hypercapnic & hypoxamic respiratory failure-s/p trach & PEG  sputum-Few Pseudomonas aeruginosa (Carbapenem Resistant)/Moderate Methicillin resistant Staphylococcus aureus  afib with RVR  [facility]  AC flare of COPD/chronic vs acute on chronic bronchitis  Mild interstitial lung disease-NSIP h/o smoking  COVID negativex2  Pulmonary hypertension  Nonobstructing stone in the left ureterovesicular junction/ nonobstructing stone in the lower pole right kidney.  Subacute hemorrhage in the right rectus abdominis along the anterior sheath, measures 1.6 cm in thickness and extends from the umbilicus to the inferior myotendinous junction  Cirrhosis  Mild splenomegaly-portal venous hypertension.  Chronic afib w Watchman device  CHF  BPH  GERD  ETOH abuse  seizures disorder    PLAN;    pulmonary slow recovery  vent support-pat multiple failed weaning attempts- s/p trach weaning as tolerated  iv ancef for bactermia/iv cipro for pseudomonas  Tube feeding as tolerated  supportive care  covid repeat testing-neg  Eliquis/asa 81  d/w staff

## 2020-05-26 NOTE — PROGRESS NOTE ADULT - SUBJECTIVE AND OBJECTIVE BOX
Subjective:    pat used PS for two hrs yesterday, on AC control, lying comfortably.    Home Medications:  albuterol 2.5 mg/3 mL (0.083%) inhalation solution: 3 milliliter(s) inhaled every 6 hours, As Needed -for shortness of breath and/or wheezing (19 Apr 2020 03:12)  gabapentin 400 mg oral capsule: 1 cap(s) orally 3 times a day (19 Apr 2020 03:12)  pantoprazole 40 mg oral granule, enteric coated: 40 milligram(s) orally once a day (19 Apr 2020 03:12)  Spiriva 18 mcg inhalation capsule: 1 cap(s) inhaled once a day (19 Apr 2020 03:12)  tamsulosin 0.4 mg oral capsule: 1 cap(s) orally once a day (at bedtime) (19 Apr 2020 03:12)  traZODone 50 mg oral tablet: 2 tab(s) orally once a day (at bedtime), As Needed (19 Apr 2020 03:12)    MEDICATIONS  (STANDING):  ALBUTerol    90 MICROgram(s) HFA Inhaler 2 Puff(s) Inhalation every 4 hours  ALPRAZolam 0.5 milliGRAM(s) Oral every 12 hours  aspirin  chewable 81 milliGRAM(s) Oral daily  budesonide 160 MICROgram(s)/formoterol 4.5 MICROgram(s) Inhaler 2 Puff(s) Inhalation two times a day  ceFAZolin   IVPB 2000 milliGRAM(s) IV Intermittent every 8 hours  chlorhexidine 0.12% Liquid 15 milliLiter(s) Oral Mucosa every 12 hours  ciprofloxacin   IVPB      ciprofloxacin   IVPB 400 milliGRAM(s) IV Intermittent every 12 hours  diltiazem    Tablet 90 milliGRAM(s) Oral every 6 hours  doxazosin 2 milliGRAM(s) Oral at bedtime  gabapentin 400 milliGRAM(s) Oral three times a day  lactated ringers. 1000 milliLiter(s) (50 mL/Hr) IV Continuous <Continuous>  pantoprazole  Injectable 40 milliGRAM(s) IV Push daily  thiamine 100 milliGRAM(s) Oral daily  tiotropium 18 MICROgram(s) Capsule 1 Capsule(s) Inhalation daily    MEDICATIONS  (PRN):  acetaminophen   Tablet .. 650 milliGRAM(s) Oral every 6 hours PRN Temp greater or equal to 38.5C (101.3F)  ibuprofen  Tablet. 400 milliGRAM(s) Oral every 6 hours PRN Moderate Pain (4 - 6)  metoclopramide Injectable 10 milliGRAM(s) IV Push every 8 hours PRN Vomiting  oxyCODONE    IR 5 milliGRAM(s) Oral every 6 hours PRN Severe Pain (7 - 10)      Allergies    Ceclor (Rash)  Duricef (Rash)    Intolerances        Vital Signs Last 24 Hrs  T(C): 37.1 (26 May 2020 08:00), Max: 37.1 (26 May 2020 08:00)  T(F): 98.8 (26 May 2020 08:00), Max: 98.8 (26 May 2020 08:00)  HR: 132 (26 May 2020 11:00) (71 - 138)  BP: 124/61 (26 May 2020 11:00) (94/76 - 157/88)  BP(mean): 76 (26 May 2020 11:00) (59 - 98)  RR: 25 (26 May 2020 11:00) (0 - 29)  SpO2: 89% (26 May 2020 11:00) (89% - 100%)  Mode: AC/ CMV (Assist Control/ Continuous Mandatory Ventilation)  RR (machine): 15  TV (machine): 450  FiO2: 35  PEEP: 5  ITime: 1  MAP: 12  PIP: 32      PHYSICAL EXAMINATION:    NECK:  Supple. No lymphadenopathy. Jugular venous pressure not elevated. Carotids equal.   HEART:   The cardiac impulse has a normal quality. Reg., Nl S1 and S2.  There are no murmurs, rubs or gallops noted  CHEST:  Chest crackles to auscultation. Normal respiratory effort.  ABDOMEN:  Soft and nontender.   EXTREMITIES:  There is no edema.       LABS:                        8.9    7.13  )-----------( 254      ( 26 May 2020 07:06 )             28.2     05-26    137  |  103  |  10  ----------------------------<  94  4.0   |  31  |  0.46<L>    Ca    8.6      26 May 2020 07:06  Phos  3.6     05-26  Mg     1.3     05-26

## 2020-05-26 NOTE — PROGRESS NOTE ADULT - SUBJECTIVE AND OBJECTIVE BOX
REASON FOR VISIT: ICU team requested a MYA    HPI:  65 year old man with a history of chronic AF, Watchman device (implanted 4/2019), HTN, CVAs, alcoholism, tobacco abuse, COPD, HFpEF, GERD, cirrhosis, left BKA following traumatic injury admitted on 4/18/2020 with  acute on chronic hypercapnic & hypoxemic respiratory failure with hospitalization complicated by encephalopathy, ventilator-dependence, and persistent MRSA bacteremia --critical care team and ID consultant request MYA to assess for vegetation.    5/22/2020:  Awake but encephalopathic.  5/23/2020:  Nods that he understands me but seems to lose attention quickly; denies pain.  5/24/20: comfortable on vent.  5/25/20: MYA planned for caitlin on vent. no new issues.  5/26/20: no new events overnight. MYA planned for today.      MEDICATIONS:  MEDICATIONS  (STANDING):  ALBUTerol    90 MICROgram(s) HFA Inhaler 2 Puff(s) Inhalation every 4 hours  ALPRAZolam 0.5 milliGRAM(s) Oral every 12 hours  apixaban 5 milliGRAM(s) Oral every 12 hours  aspirin  chewable 81 milliGRAM(s) Oral daily  budesonide 160 MICROgram(s)/formoterol 4.5 MICROgram(s) Inhaler 2 Puff(s) Inhalation two times a day  ceFAZolin   IVPB 2000 milliGRAM(s) IV Intermittent every 8 hours  chlorhexidine 0.12% Liquid 15 milliLiter(s) Oral Mucosa every 12 hours  ciprofloxacin   IVPB      ciprofloxacin   IVPB 400 milliGRAM(s) IV Intermittent every 12 hours  diltiazem    Tablet 90 milliGRAM(s) Oral every 6 hours  doxazosin 2 milliGRAM(s) Oral at bedtime  gabapentin 400 milliGRAM(s) Oral three times a day  lactated ringers. 1000 milliLiter(s) (50 mL/Hr) IV Continuous <Continuous>  magnesium sulfate  IVPB 2 Gram(s) IV Intermittent every 2 hours  pantoprazole  Injectable 40 milliGRAM(s) IV Push daily  polyethylene glycol 3350 17 Gram(s) Oral daily  thiamine 100 milliGRAM(s) Oral daily  tiotropium 18 MICROgram(s) Capsule 1 Capsule(s) Inhalation daily    MEDICATIONS  (PRN):  acetaminophen   Tablet .. 650 milliGRAM(s) Oral every 6 hours PRN Temp greater or equal to 38.5C (101.3F)  ibuprofen  Tablet. 400 milliGRAM(s) Oral every 6 hours PRN Moderate Pain (4 - 6)  metoclopramide Injectable 10 milliGRAM(s) IV Push every 8 hours PRN Vomiting  oxyCODONE    IR 5 milliGRAM(s) Oral every 6 hours PRN Severe Pain (7 - 10)      Vital Signs Last 24 Hrs  T(C): 37.1 (26 May 2020 08:00), Max: 37.1 (26 May 2020 08:00)  T(F): 98.8 (26 May 2020 08:00), Max: 98.8 (26 May 2020 08:00)  HR: 108 (26 May 2020 08:00) (71 - 138)  BP: 116/58 (26 May 2020 08:00) (94/76 - 157/88)  BP(mean): 71 (26 May 2020 08:00) (59 - 98)  RR: 22 (26 May 2020 08:00) (0 - 32)  SpO2: 99% (26 May 2020 08:00) (94% - 100%)    I&O's Summary    25 May 2020 07:01  -  26 May 2020 07:00  --------------------------------------------------------  IN: 2755 mL / OUT: 2500 mL / NET: 255 mL    26 May 2020 07:01  -  26 May 2020 08:14  --------------------------------------------------------  IN: 50 mL / OUT: 725 mL / NET: -675 mL        PHYSICAL EXAM:    Constitutional: Awake, looking around room, no distress  HEENT: Trach to vent  Respiratory: Lungs clear  Cardiovascular: Irregular, tachycardic  Gastrointestinal: Abdomen is soft, PEG   Extremities:  L BKA; right pedal edema         LABS: All Labs Reviewed:                        8.9    7.13  )-----------( 254      ( 26 May 2020 07:06 )             28.2                         8.4    5.89  )-----------( 213      ( 25 May 2020 07:31 )             27.0     26 May 2020 07:06    137    |  103    |  10     ----------------------------<  94     4.0     |  31     |  0.46   25 May 2020 07:31    142    |  106    |  9      ----------------------------<  125    3.7     |  32     |  0.47     Ca    8.6        26 May 2020 07:06  Ca    8.5        25 May 2020 07:31  Phos  3.6       26 May 2020 07:06  Phos  3.5       25 May 2020 07:31  Mg     1.3       26 May 2020 07:06  Mg     1.4       25 May 2020 07:31      Transthoracic Echocardiogram Follow Up (04.21.20 @ 09:10):   Moderate (2+) mitral regurgitation.   Moderate (2+) tricuspid valve regurgitation.   The left atrium is moderately dilated.   The left ventricle cavity is moderately dilated.   Normal left ventricular systolic function. Mild concentric left ventricular hypertrophy is present. Estimated left ventricular ejection fraction is 60-65 %.   The right atrium appears moderately dilated.   Normal appearing right ventricle structure and function.     TTE Echo Complete w/o contrast w/ Doppler (05.19.20 @ 11:14):   The left ventricle is normal in size, wall motion and contractility. Mild concentric left ventricular hypertrophy is present. Estimated left ventricular ejection fraction is 55-60 %.   The left atrium is moderately dilated.   The right atrium appears moderately dilated.   Normal appearing right ventricle structure and function.   Moderate (2+) mitral regurgitation is present.   Mild to moderate Tricuspid regurgitation is present.   Trace pulmonic valvular regurgitation is present.   No evidence of pericardial effusion.   Pleural effusion cannot be ruled out.    Tele: AF    Culture - Blood in AM (05.20.20 @ 06:41):  Growth in aerobic bottle: Gram Positive Cocci in Clusters.   Staphylococcus aureus

## 2020-05-26 NOTE — CHART NOTE - NSCHARTNOTEFT_GEN_A_CORE
Assessment:   *pt s/p trach and PEG on 5/15 and has failed SBT.  Pt pending MYA today.    *labs reviewed; hypomagnesemia; monitor and replete prn.    *urine output: 2400mL.  Rectal tube: 100mL; on miralax and banatrol, d/w MD; miralax to be d/c'd.  banatrol will help to stop diarrhea.    *per flowsheet, pt received an average of 1521mL/day of Jevity 1.5 over the past 3 days.  Combined with prosource TF and banatrol provided ~2562kcal and 139g protein.  Which met ~95% of estimated protein needs and 100% of estimated calorie needs.    *no new wt documented; obtain new wt when feasible.    Recommendations:  1) c/w Jevity 1.5 @ 80mL/hr with 4pkts prosource TF daily and 3pkts banatrol daily  2) monitor hydration; adjust free water flushes prn  3) monitor TF tolerance; keep back of bed > 35 degrees  4) monitor BM; adjust bowel regimen prn  5) obtain new wt when feasible      Diet Prescription: Diet, NPO with Tube Feed:   Tube Feeding Modality: Gastrostomy  Jevity 1.5 Randall (JEVITY1.5)  Total Volume for 24 Hours (mL): 1920  Continuous  Until Goal Tube Feed Rate (mL per Hour): 80  Tube Feed Duration (in Hours): 24  Tube Feed Start Time: 00:00  No Carb Prosource TF     Qty per Day:  4  Banatrol TF     Qty per Day:  3 (05-22-20 @ 11:05)  Diet, NPO after Midnight:      NPO Start Date: 25-May-2020,   NPO Start Time: 23:59  Except Medications (05-25-20 @ 10:11)      Wt Hx:  117.1Kg (5/9)  117.9Kg (4/18)      05-25-20 @ 07:01  -  05-26-20 @ 07:00  --------------------------------------------------------  IN: 2755 mL / OUT: 2500 mL / NET: 255 mL    05-26-20 @ 07:01 - 05-26-20 @ 11:16  --------------------------------------------------------  IN: 50 mL / OUT: 725 mL / NET: -675 mL        Estimated Needs:   2250-2700Kcal (25-30Kcal/Kg of adjust BW; 90Kg)  146-162g protein (1.8-2g/Kg IBW: 81Kg)  2250-2700mL (25-30mL/Kg of adjusted BW: 90Kg)      Pertinent Labs: 05-26 Na137 mmol/L Glu 94 mg/dL K+ 4.0 mmol/L Cr  0.46 mg/dL<L> BUN 10 mg/dL 05-26 Phos 3.6 mg/dL 05-10 Chol --    LDL --    HDL --    Trig 106 mg/dL     Skin: karla score = 13  SDTI Rt buttocks  stage 2 PU on Lt buttocks      Monitoring and Evaluation:   [x] PO intake/Nutr support infusion [ x ] Tolerance to Nutr [ x ] weights [ x ] labs[ x ] follow up per protocol  [ ] other:

## 2020-05-26 NOTE — PROGRESS NOTE ADULT - SUBJECTIVE AND OBJECTIVE BOX
Subjective:  No acute events overnight.      Vital Signs:  Vital Signs Last 24 Hrs  T(C): 36.3 (05-26-20 @ 12:46), Max: 37.1 (05-26-20 @ 08:00)  T(F): 97.4 (05-26-20 @ 12:46), Max: 98.8 (05-26-20 @ 08:00)  HR: 112 (05-26-20 @ 12:46) (71 - 138)  BP: 101/58 (05-26-20 @ 12:46) (94/76 - 157/88)  RR: 20 (05-26-20 @ 12:46) (0 - 29)  SpO2: 97% (05-26-20 @ 12:46) (89% - 100%) on (O2)    Telemetry/Alarms: afib    Relevant labs, radiology and Medications reviewed                        8.9    7.13  )-----------( 254      ( 26 May 2020 07:06 )             28.2     05-26    137  |  103  |  10  ----------------------------<  94  4.0   |  31  |  0.46<L>    Ca    8.6      26 May 2020 07:06  Phos  3.6     05-26  Mg     1.3     05-26        MEDICATIONS  (STANDING):  ALBUTerol    90 MICROgram(s) HFA Inhaler 2 Puff(s) Inhalation every 4 hours  ALPRAZolam 0.5 milliGRAM(s) Oral every 12 hours  aspirin  chewable 81 milliGRAM(s) Oral daily  budesonide 160 MICROgram(s)/formoterol 4.5 MICROgram(s) Inhaler 2 Puff(s) Inhalation two times a day  ceFAZolin   IVPB 2000 milliGRAM(s) IV Intermittent every 8 hours  chlorhexidine 0.12% Liquid 15 milliLiter(s) Oral Mucosa every 12 hours  ciprofloxacin   IVPB      ciprofloxacin   IVPB 400 milliGRAM(s) IV Intermittent every 12 hours  diltiazem    Tablet 90 milliGRAM(s) Oral every 6 hours  doxazosin 2 milliGRAM(s) Oral at bedtime  enoxaparin Injectable 40 milliGRAM(s) SubCutaneous every 12 hours  gabapentin 400 milliGRAM(s) Oral three times a day  lactated ringers. 1000 milliLiter(s) (50 mL/Hr) IV Continuous <Continuous>  pantoprazole  Injectable 40 milliGRAM(s) IV Push daily  QUEtiapine 50 milliGRAM(s) Oral every 12 hours  thiamine 100 milliGRAM(s) Oral daily  tiotropium 18 MICROgram(s) Capsule 1 Capsule(s) Inhalation daily    MEDICATIONS  (PRN):  acetaminophen   Tablet .. 650 milliGRAM(s) Oral every 6 hours PRN Temp greater or equal to 38.5C (101.3F)  ibuprofen  Tablet. 400 milliGRAM(s) Oral every 6 hours PRN Moderate Pain (4 - 6)  metoclopramide Injectable 10 milliGRAM(s) IV Push every 8 hours PRN Vomiting  oxyCODONE    IR 5 milliGRAM(s) Oral every 6 hours PRN Severe Pain (7 - 10)      Physical exam  Gen: NAD  Neuro: sedated  Card: S1 S2 irregular  Pulm: Course breath sounds, tracheostomy site is intact  Abd: Soft obtund  Ext: moderate edema    I&O's Summary    25 May 2020 07:01  -  26 May 2020 07:00  --------------------------------------------------------  IN: 2755 mL / OUT: 2500 mL / NET: 255 mL    26 May 2020 07:01  -  26 May 2020 13:57  --------------------------------------------------------  IN: 350 mL / OUT: 1200 mL / NET: -850 mL        Assessment  65y Male  w/ PAST MEDICAL & SURGICAL HISTORY:  Chronic CHF  COPD without exacerbation  Atrial fibrillation  Presence of Watchman left atrial appendage closure device  Hypertension  GERD (gastroesophageal reflux disease)  Chronic obstructive pulmonary disease (COPD)  Anemia  Falls  Meningitis  Collapsed lung  Alcohol withdrawal  Emphysema of lung  Cirrhosis  CHF (congestive heart failure)  Poor historian  Alcohol abuse  Chronic atrial fibrillation  Unilateral amputation of lower extremity below knee  Presence of Watchman left atrial appendage closure device  S/P BKA (below knee amputation) unilateral, left  admitted with acute hypoxemic, hypercapneic resp failure  COPD exacerbation (26 May 2020 13:16)  .  On 5/15/20 patient underwent Bronchoscopy with creation of tracheostomy and insertion of percutaneous endoscopic gastrostomy (PEG) tube  Insertion, nasogastric tube    PLAN  Neuro: Pain management, sedation weaning  Pulm: wean ventilator as tolerated   Cardio: Monitor telemetry/alarms - MYA today  GI: continue TF to goal  Renal: monitor urine output, supplement electrolytes as needed  Vasc: Eliquis  Heme: Stable H/H. .   ID: continue abx for MRSA pseudomonas in sputum    Discussed with Cardiothoracic Team at AM rounds.

## 2020-05-26 NOTE — PROGRESS NOTE ADULT - SUBJECTIVE AND OBJECTIVE BOX
[facility]  ICU # 32  Vent # 24  Trach and PEG # 10    COVID-19 NEGATIVE x2    66 y/o M admitted on 4/18 with COPD exacerbation -- RRT called on 4/23 for AMS requiring intubation  extubated on 4/26 then re intubated on 4/29.    S/P Trach and PEG on 5/15 and has failed SBT.    Most recent active issues is persistent MSSA sepsis and CRP in sputum--on IV vanco and Cipro.  No central access      5/22:  remains intubated, encephalopathy.  (+) MSSA + in Bcx  5/23:  Tolerating limited PSV - persistent poor mentation.  Repeat BCx sent.  Abx changed to Ancef  5/24:  Rapid AFib O/N.  Still Encephalopathy.  COVID sent.  5/23 BCx still P.        above d/w Dr Dorado who has been caring for the patient.    5/25: cultures from 5/23 remain NGTD - COVID swab again negative on 5/24.    Hemodynamics reasonable.    Remains intubated, NOT sedated, mentation remains poor.  TF in progress.  On Abx.    5/26: for MYA this morning.  Persistent AMS - hemodynamics reasonable.  Remains vent dependent.      PMHx - chronic AFib w Watchman device, CHF, COPD, HTN, obesity     Allergies    Ceclor (Rash)  Duricef (Rash)    ICU Vital Signs Last 24 Hrs  T(C): 37.1 (26 May 2020 08:00), Max: 37.1 (26 May 2020 08:00)  T(F): 98.8 (26 May 2020 08:00), Max: 98.8 (26 May 2020 08:00)  HR: 109 (26 May 2020 10:00) (71 - 138)  BP: 128/57 (26 May 2020 10:00) (94/76 - 157/88)  BP(mean): 74 (26 May 2020 10:00) (59 - 98)  ABP: --  ABP(mean): --  RR: 18 (26 May 2020 10:00) (0 - 29)  SpO2: 96% (26 May 2020 10:00) (94% - 100%)      Mode: AC/ CMV (Assist Control/ Continuous Mandatory Ventilation)  RR (machine): 15  TV (machine): 450  FiO2: 35  PEEP: 5  ITime: 1  MAP: 12  PIP: 32      I&O's Summary    25 May 2020 07:01  -  26 May 2020 07:00  --------------------------------------------------------  IN: 2755 mL / OUT: 2500 mL / NET: 255 mL    26 May 2020 07:01  -  26 May 2020 11:28  --------------------------------------------------------  IN: 50 mL / OUT: 725 mL / NET: -675 mL                              8.9    7.13  )-----------( 254      ( 26 May 2020 07:06 )             28.2       05-26    137  |  103  |  10  ----------------------------<  94  4.0   |  31  |  0.46<L>    Ca    8.6      26 May 2020 07:06  Phos  3.6     05-26  Mg     1.3     05-26    MEDICATIONS  (STANDING):  ALBUTerol    90 MICROgram(s) HFA Inhaler 2 Puff(s) Inhalation every 4 hours  ALPRAZolam 0.5 milliGRAM(s) Oral every 12 hours  aspirin  chewable 81 milliGRAM(s) Oral daily  budesonide 160 MICROgram(s)/formoterol 4.5 MICROgram(s) Inhaler 2 Puff(s) Inhalation two times a day  ceFAZolin   IVPB 2000 milliGRAM(s) IV Intermittent every 8 hours  chlorhexidine 0.12% Liquid 15 milliLiter(s) Oral Mucosa every 12 hours  ciprofloxacin   IVPB      ciprofloxacin   IVPB 400 milliGRAM(s) IV Intermittent every 12 hours  diltiazem    Tablet 90 milliGRAM(s) Oral every 6 hours  doxazosin 2 milliGRAM(s) Oral at bedtime  gabapentin 400 milliGRAM(s) Oral three times a day  lactated ringers. 1000 milliLiter(s) (50 mL/Hr) IV Continuous <Continuous>  pantoprazole  Injectable 40 milliGRAM(s) IV Push daily  thiamine 100 milliGRAM(s) Oral daily  tiotropium 18 MICROgram(s) Capsule 1 Capsule(s) Inhalation daily    MEDICATIONS  (PRN):  acetaminophen   Tablet .. 650 milliGRAM(s) Oral every 6 hours PRN Temp greater or equal to 38.5C (101.3F)  ibuprofen  Tablet. 400 milliGRAM(s) Oral every 6 hours PRN Moderate Pain (4 - 6)  metoclopramide Injectable 10 milliGRAM(s) IV Push every 8 hours PRN Vomiting  oxyCODONE    IR 5 milliGRAM(s) Oral every 6 hours PRN Severe Pain (7 - 10)            Review of Systems:   · Additional ROS	Unobtainable due to clinical condition	    Physical Exam:   · Constitutional	detailed exam	  · Constitutional Details	ill; restless but poorly cooperative	  · ENMT	detailed exam	  · ENMT Comments	Trach in situ connected to Vent	  · Respiratory	detailed exam	  · Respiratory Details	breath sounds equal B/L; NO sig W/R/R	  · Cardiovascular	detailed exam	  · Cardiovascular Details	irregular rate and rhythm	  · Gastrointestinal	detailed exam	  · GI Normal	soft; NT/ND (+) BS	  · GI Abnormal	+ PEG in situ; Site is CDI	  · Extremities	detailed exam	  · Extremities Details	no cyanosis	  · Neurological	detailed exam	  · Neurological Details	Encephalopathy persists; minimally interactive; MARIE spont NO gross motor or sensory deficits	  · Skin	detailed exam	  · Skin Details	warm and dry	        DVT Prophylaxis: Apixaban; venodynes [facility]  ICU # 32  Vent # 24  Trach and PEG # 10    COVID-19 NEGATIVE x2    66 y/o M admitted on 4/18 with COPD exacerbation -- RRT called on 4/23 for AMS requiring intubation  extubated on 4/26 then re intubated on 4/29.    S/P Trach and PEG on 5/15 and has failed SBT.    Most recent active issues is persistent MSSA sepsis and CRP in sputum--on IV vanco and Cipro.  No central access      5/22:  remains intubated, encephalopathy.  (+) MSSA + in Bcx  5/23:  Tolerating limited PSV - persistent poor mentation.  Repeat BCx sent.  Abx changed to Ancef  5/24:  Rapid AFib O/N.  Still Encephalopathy.  COVID sent.  5/23 BCx still P.        above d/w Dr Dorado who has been caring for the patient.    5/25: cultures from 5/23 remain NGTD - COVID swab again negative on 5/24.    Hemodynamics reasonable.    Remains intubated, NOT sedated, mentation remains poor.  TF in progress.  On Abx.    5/26: for MYA this morning.  Persistent AMS - hemodynamics reasonable.  Remains vent dependent.      PMHx - chronic AFib w Watchman device, CHF, COPD, HTN, obesity     Allergies    Ceclor (Rash)  Duricef (Rash)    ICU Vital Signs Last 24 Hrs  T(C): 37.1 (26 May 2020 08:00), Max: 37.1 (26 May 2020 08:00)  T(F): 98.8 (26 May 2020 08:00), Max: 98.8 (26 May 2020 08:00)  HR: 109 (26 May 2020 10:00) (71 - 138)  BP: 128/57 (26 May 2020 10:00) (94/76 - 157/88)  BP(mean): 74 (26 May 2020 10:00) (59 - 98)  ABP: --  ABP(mean): --  RR: 18 (26 May 2020 10:00) (0 - 29)  SpO2: 96% (26 May 2020 10:00) (94% - 100%)      Mode: AC/ CMV (Assist Control/ Continuous Mandatory Ventilation)  RR (machine): 15  TV (machine): 450  FiO2: 35  PEEP: 5  ITime: 1  MAP: 12  PIP: 32      I&O's Summary    25 May 2020 07:01  -  26 May 2020 07:00  --------------------------------------------------------  IN: 2755 mL / OUT: 2500 mL / NET: 255 mL    26 May 2020 07:01  -  26 May 2020 11:28  --------------------------------------------------------  IN: 50 mL / OUT: 725 mL / NET: -675 mL                              8.9    7.13  )-----------( 254      ( 26 May 2020 07:06 )             28.2       05-26    137  |  103  |  10  ----------------------------<  94  4.0   |  31  |  0.46<L>    Ca    8.6      26 May 2020 07:06  Phos  3.6     05-26  Mg     1.3     05-26    MEDICATIONS  (STANDING):  ALBUTerol    90 MICROgram(s) HFA Inhaler 2 Puff(s) Inhalation every 4 hours  ALPRAZolam 0.5 milliGRAM(s) Oral every 12 hours  aspirin  chewable 81 milliGRAM(s) Oral daily  budesonide 160 MICROgram(s)/formoterol 4.5 MICROgram(s) Inhaler 2 Puff(s) Inhalation two times a day  ceFAZolin   IVPB 2000 milliGRAM(s) IV Intermittent every 8 hours  chlorhexidine 0.12% Liquid 15 milliLiter(s) Oral Mucosa every 12 hours  ciprofloxacin   IVPB      ciprofloxacin   IVPB 400 milliGRAM(s) IV Intermittent every 12 hours  diltiazem    Tablet 90 milliGRAM(s) Oral every 6 hours  doxazosin 2 milliGRAM(s) Oral at bedtime  gabapentin 400 milliGRAM(s) Oral three times a day  lactated ringers. 1000 milliLiter(s) (50 mL/Hr) IV Continuous <Continuous>  pantoprazole  Injectable 40 milliGRAM(s) IV Push daily  thiamine 100 milliGRAM(s) Oral daily  tiotropium 18 MICROgram(s) Capsule 1 Capsule(s) Inhalation daily    MEDICATIONS  (PRN):  acetaminophen   Tablet .. 650 milliGRAM(s) Oral every 6 hours PRN Temp greater or equal to 38.5C (101.3F)  ibuprofen  Tablet. 400 milliGRAM(s) Oral every 6 hours PRN Moderate Pain (4 - 6)  metoclopramide Injectable 10 milliGRAM(s) IV Push every 8 hours PRN Vomiting  oxyCODONE    IR 5 milliGRAM(s) Oral every 6 hours PRN Severe Pain (7 - 10)            Review of Systems:   · Additional ROS	Unobtainable due to clinical condition	    Physical Exam:   · Constitutional	detailed exam	  · Constitutional Details	ill; restless but poorly cooperative	  · ENMT	detailed exam	  · ENMT Comments	Trach in situ connected to Vent	  · Respiratory	detailed exam	  · Respiratory Details	breath sounds equal B/L; NO sig W/R/R	  · Cardiovascular	detailed exam	  · Cardiovascular Details	irregular rate and rhythm	  · Gastrointestinal	detailed exam	  · GI Normal	soft; NT/ND (+) BS	  · GI Abnormal	+ PEG in situ; Site is CDI	  · Extremities	detailed exam	  · Extremities Details	no cyanosis; LLE BKA	  · Neurological	detailed exam	  · Neurological Details	Encephalopathy persists; minimally interactive; MARIE spont NO gross motor or sensory deficits	  · Skin	detailed exam	  · Skin Details	warm and dry	        DVT Prophylaxis: Apixaban; venodynes

## 2020-05-26 NOTE — PROGRESS NOTE ADULT - ASSESSMENT
64 y/o male with h/o chronic A.Fib with Watchman device, CHF, COPD, HTN, obesity was admitted on 4/18 for worsening SOB. In ER he was found with rapid A.fib and was placed on NRB. He tested COVID-19 PCR negative x 3. Noted with hypercapnea and placed on BiPAP, then intubated and placed on ventilatory support. He was extubated on 4/26. He is reported with MDRO's in sputum c/s. He was treated with azithromycin from 4/20 to 4/25.     1. Low grade fever. Persistent sepsis with MSSA. Right arm cellulitis/ phlebitis. Acute respiratory failure. 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 Allergy to PCN and cephalosporines. Possible COVID-19 syndrome.   -fever is down  -concerned about sepsis with MSSA ?source ?endocarditis  -encephalopathy   -cultures reviewed; repeat sputum c/s shows CRE-PSAE  -s/p cipro IV # 9  -s/p vancomycin 1 gm IV q12h # 10 days until 5/7  -respiratory care  -repeat BC x 2 are negative to date  -s/p vancomycin 1 gm IV q12h # 4 days  -on cefazolin 2 gm iV q8h # 5 and cipro IV # 5  -tolerating abx well so far; no side effects noted  -he had prior CRE-PSAE; new sputum culture shows PSAE again; CXR dose not show infiltrates  -f/u closely for rashes  -repeat BC are negative to date  -monitor closely in tod of duricef allergy history  -continue abx coverage  -cardiology evaluation appreciated; plan for MYA today  -monitor temps  -f/u CBC  -supportive care  2. Other issues:   -care per medicine

## 2020-05-27 LAB
ANION GAP SERPL CALC-SCNC: 6 MMOL/L — SIGNIFICANT CHANGE UP (ref 5–17)
BASE EXCESS BLDA CALC-SCNC: 5.9 MMOL/L — HIGH (ref -2–2)
BLOOD GAS COMMENTS ARTERIAL: SIGNIFICANT CHANGE UP
BUN SERPL-MCNC: 9 MG/DL — SIGNIFICANT CHANGE UP (ref 7–23)
CALCIUM SERPL-MCNC: 8.6 MG/DL — SIGNIFICANT CHANGE UP (ref 8.5–10.1)
CHLORIDE SERPL-SCNC: 103 MMOL/L — SIGNIFICANT CHANGE UP (ref 96–108)
CO2 SERPL-SCNC: 30 MMOL/L — SIGNIFICANT CHANGE UP (ref 22–31)
CREAT SERPL-MCNC: 0.57 MG/DL — SIGNIFICANT CHANGE UP (ref 0.5–1.3)
GLUCOSE SERPL-MCNC: 105 MG/DL — HIGH (ref 70–99)
HCO3 BLDA-SCNC: 30 MMOL/L — HIGH (ref 21–29)
HCT VFR BLD CALC: 27.4 % — LOW (ref 39–50)
HGB BLD-MCNC: 8.8 G/DL — LOW (ref 13–17)
MAGNESIUM SERPL-MCNC: 1.9 MG/DL — SIGNIFICANT CHANGE UP (ref 1.6–2.6)
MCHC RBC-ENTMCNC: 31.5 PG — SIGNIFICANT CHANGE UP (ref 27–34)
MCHC RBC-ENTMCNC: 32.1 GM/DL — SIGNIFICANT CHANGE UP (ref 32–36)
MCV RBC AUTO: 98.2 FL — SIGNIFICANT CHANGE UP (ref 80–100)
PCO2 BLDA: 44 MMHG — SIGNIFICANT CHANGE UP (ref 32–46)
PH BLDA: 7.45 — SIGNIFICANT CHANGE UP (ref 7.35–7.45)
PHOSPHATE SERPL-MCNC: 3.6 MG/DL — SIGNIFICANT CHANGE UP (ref 2.5–4.5)
PLATELET # BLD AUTO: 259 K/UL — SIGNIFICANT CHANGE UP (ref 150–400)
PO2 BLDA: 104 MMHG — SIGNIFICANT CHANGE UP (ref 74–108)
POTASSIUM SERPL-MCNC: 3.9 MMOL/L — SIGNIFICANT CHANGE UP (ref 3.5–5.3)
POTASSIUM SERPL-SCNC: 3.9 MMOL/L — SIGNIFICANT CHANGE UP (ref 3.5–5.3)
RBC # BLD: 2.79 M/UL — LOW (ref 4.2–5.8)
RBC # FLD: 13.5 % — SIGNIFICANT CHANGE UP (ref 10.3–14.5)
SAO2 % BLDA: 98 % — HIGH (ref 92–96)
SODIUM SERPL-SCNC: 139 MMOL/L — SIGNIFICANT CHANGE UP (ref 135–145)
WBC # BLD: 7.75 K/UL — SIGNIFICANT CHANGE UP (ref 3.8–10.5)
WBC # FLD AUTO: 7.75 K/UL — SIGNIFICANT CHANGE UP (ref 3.8–10.5)

## 2020-05-27 PROCEDURE — 99233 SBSQ HOSP IP/OBS HIGH 50: CPT

## 2020-05-27 PROCEDURE — 99232 SBSQ HOSP IP/OBS MODERATE 35: CPT

## 2020-05-27 PROCEDURE — 71045 X-RAY EXAM CHEST 1 VIEW: CPT | Mod: 26

## 2020-05-27 RX ORDER — CLOPIDOGREL BISULFATE 75 MG/1
75 TABLET, FILM COATED ORAL DAILY
Refills: 0 | Status: DISCONTINUED | OUTPATIENT
Start: 2020-05-27 | End: 2020-07-08

## 2020-05-27 RX ADMIN — GABAPENTIN 400 MILLIGRAM(S): 400 CAPSULE ORAL at 05:17

## 2020-05-27 RX ADMIN — Medication 100 MILLIGRAM(S): at 05:16

## 2020-05-27 RX ADMIN — Medication 90 MILLIGRAM(S): at 17:14

## 2020-05-27 RX ADMIN — Medication 100 MILLIGRAM(S): at 11:50

## 2020-05-27 RX ADMIN — ENOXAPARIN SODIUM 40 MILLIGRAM(S): 100 INJECTION SUBCUTANEOUS at 17:14

## 2020-05-27 RX ADMIN — Medication 0.5 MILLIGRAM(S): at 05:17

## 2020-05-27 RX ADMIN — GABAPENTIN 400 MILLIGRAM(S): 400 CAPSULE ORAL at 13:37

## 2020-05-27 RX ADMIN — Medication 90 MILLIGRAM(S): at 23:13

## 2020-05-27 RX ADMIN — QUETIAPINE FUMARATE 50 MILLIGRAM(S): 200 TABLET, FILM COATED ORAL at 17:14

## 2020-05-27 RX ADMIN — Medication 2 MILLIGRAM(S): at 22:25

## 2020-05-27 RX ADMIN — GABAPENTIN 400 MILLIGRAM(S): 400 CAPSULE ORAL at 22:25

## 2020-05-27 RX ADMIN — Medication 90 MILLIGRAM(S): at 11:50

## 2020-05-27 RX ADMIN — Medication 200 MILLIGRAM(S): at 05:16

## 2020-05-27 RX ADMIN — CLOPIDOGREL BISULFATE 75 MILLIGRAM(S): 75 TABLET, FILM COATED ORAL at 11:50

## 2020-05-27 RX ADMIN — Medication 90 MILLIGRAM(S): at 05:17

## 2020-05-27 RX ADMIN — ENOXAPARIN SODIUM 40 MILLIGRAM(S): 100 INJECTION SUBCUTANEOUS at 05:18

## 2020-05-27 RX ADMIN — OXYCODONE HYDROCHLORIDE 5 MILLIGRAM(S): 5 TABLET ORAL at 03:55

## 2020-05-27 RX ADMIN — CHLORHEXIDINE GLUCONATE 15 MILLILITER(S): 213 SOLUTION TOPICAL at 17:14

## 2020-05-27 RX ADMIN — Medication 81 MILLIGRAM(S): at 11:50

## 2020-05-27 RX ADMIN — Medication 200 MILLIGRAM(S): at 17:15

## 2020-05-27 RX ADMIN — PANTOPRAZOLE SODIUM 40 MILLIGRAM(S): 20 TABLET, DELAYED RELEASE ORAL at 11:50

## 2020-05-27 RX ADMIN — CHLORHEXIDINE GLUCONATE 15 MILLILITER(S): 213 SOLUTION TOPICAL at 05:17

## 2020-05-27 RX ADMIN — OXYCODONE HYDROCHLORIDE 5 MILLIGRAM(S): 5 TABLET ORAL at 09:51

## 2020-05-27 RX ADMIN — Medication 100 MILLIGRAM(S): at 13:37

## 2020-05-27 RX ADMIN — Medication 100 MILLIGRAM(S): at 22:25

## 2020-05-27 RX ADMIN — QUETIAPINE FUMARATE 50 MILLIGRAM(S): 200 TABLET, FILM COATED ORAL at 11:50

## 2020-05-27 RX ADMIN — Medication 0.5 MILLIGRAM(S): at 17:14

## 2020-05-27 NOTE — PROGRESS NOTE ADULT - SUBJECTIVE AND OBJECTIVE BOX
65 year old man with a history of chronic AF, Watchman device (implanted 4/2019), HTN, CVAs, alcoholism, tobacco abuse, COPD, HFpEF, GERD, cirrhosis, left BKA following traumatic injury admitted on 4/18/2020 with  acute on chronic hypercapnic & hypoxemic respiratory failure with hospitalization complicated by encephalopathy, ventilator-dependence, and persistent MRSA bacteremia --critical care team and ID consultant request MYA to assess for vegetation.    5/22/2020:  Awake but encephalopathic.  5/23/2020:  Nods that he understands me but seems to lose attention quickly; denies pain.  5/24/20: comfortable on vent.  5/25/20: MYA planned for caitlin on vent. no new issues.  5/26/20: no new events overnight. MYA planned for today.    MEDICATIONS:    MEDICATIONS  (STANDING):  ALBUTerol    90 MICROgram(s) HFA Inhaler 2 Puff(s) Inhalation every 4 hours  ALPRAZolam 0.5 milliGRAM(s) Oral every 12 hours  aspirin  chewable 81 milliGRAM(s) Oral daily  budesonide 160 MICROgram(s)/formoterol 4.5 MICROgram(s) Inhaler 2 Puff(s) Inhalation two times a day  ceFAZolin   IVPB 2000 milliGRAM(s) IV Intermittent every 8 hours  chlorhexidine 0.12% Liquid 15 milliLiter(s) Oral Mucosa every 12 hours  ciprofloxacin   IVPB      ciprofloxacin   IVPB 400 milliGRAM(s) IV Intermittent every 12 hours  clopidogrel Tablet 75 milliGRAM(s) Oral daily  diltiazem    Tablet 90 milliGRAM(s) Oral every 6 hours  doxazosin 2 milliGRAM(s) Oral at bedtime  enoxaparin Injectable 40 milliGRAM(s) SubCutaneous every 12 hours  gabapentin 400 milliGRAM(s) Oral three times a day  pantoprazole  Injectable 40 milliGRAM(s) IV Push daily  QUEtiapine 50 milliGRAM(s) Oral every 12 hours  thiamine 100 milliGRAM(s) Oral daily  tiotropium 18 MICROgram(s) Capsule 1 Capsule(s) Inhalation daily    MEDICATIONS  (PRN):  acetaminophen   Tablet .. 650 milliGRAM(s) Oral every 6 hours PRN Temp greater or equal to 38.5C (101.3F)  ibuprofen  Tablet. 400 milliGRAM(s) Oral every 6 hours PRN Moderate Pain (4 - 6)  metoclopramide Injectable 10 milliGRAM(s) IV Push every 8 hours PRN Vomiting  oxyCODONE    IR 5 milliGRAM(s) Oral every 6 hours PRN Severe Pain (7 - 10)      REVIEW OF SYSTEMS:    CONSTITUTIONAL: No weakness, fevers or chills  EYES/ENT: No visual changes;  No vertigo or throat pain   NECK: No pain or stiffness  RESPIRATORY: No cough, wheezing, hemoptysis; No shortness of breath  CARDIOVASCULAR: No chest pain or palpitations  GASTROINTESTINAL: No abdominal or epigastric pain. No nausea, vomiting, or hematemesis; No diarrhea or constipation. No melena or hematochezia.  GENITOURINARY: No dysuria, frequency or hematuria  NEUROLOGICAL: No numbness or weakness  SKIN: No itching, burning, rashes, or lesions   All other review of systems is negative unless indicated above    Vital Signs Last 24 Hrs  T(C): 37.1 (27 May 2020 08:00), Max: 37.3 (27 May 2020 04:00)  T(F): 98.7 (27 May 2020 08:00), Max: 99.2 (27 May 2020 04:00)  HR: 102 (27 May 2020 16:01) (84 - 123)  BP: 96/53 (27 May 2020 16:00) (86/67 - 157/80)  BP(mean): 64 (27 May 2020 16:00) (58 - 97)  RR: 31 (27 May 2020 16:00) (15 - 35)  SpO2: 96% (27 May 2020 16:01) (91% - 100%)Daily     Daily I&O's Summary    26 May 2020 07:01  -  27 May 2020 07:00  --------------------------------------------------------  IN: 2564 mL / OUT: 3075 mL / NET: -511 mL        PHYSICAL EXAM:    Constitutional: Awake, looking around room, no distress  HEENT: Trach to vent  Respiratory: Lungs clear  Cardiovascular: Irregular, tachycardic  Gastrointestinal: Abdomen is soft, PEG   Extremities:  L BKA; right pedal edema     LABS: All Labs Reviewed:                        8.8    7.75  )-----------( 259      ( 27 May 2020 07:26 )             27.4                         8.9    7.13  )-----------( 254      ( 26 May 2020 07:06 )             28.2                         8.4    5.89  )-----------( 213      ( 25 May 2020 07:31 )             27.0     27 May 2020 07:26    139    |  103    |  9      ----------------------------<  105    3.9     |  30     |  0.57   26 May 2020 07:06    137    |  103    |  10     ----------------------------<  94     4.0     |  31     |  0.46   25 May 2020 07:31    142    |  106    |  9      ----------------------------<  125    3.7     |  32     |  0.47     Ca    8.6        27 May 2020 07:26  Ca    8.6        26 May 2020 07:06  Ca    8.5        25 May 2020 07:31  Phos  3.6       27 May 2020 07:26  Phos  3.6       26 May 2020 07:06  Phos  3.5       25 May 2020 07:31  Mg     1.9       27 May 2020 07:26  Mg     1.3       26 May 2020 07:06  Mg     1.4       25 May 2020 07:31      Transthoracic Echocardiogram Follow Up (04.21.20 @ 09:10):   Moderate (2+) mitral regurgitation.   Moderate (2+) tricuspid valve regurgitation.   The left atrium is moderately dilated.   The left ventricle cavity is moderately dilated.   Normal left ventricular systolic function. Mild concentric left ventricular hypertrophy is present. Estimated left ventricular ejection fraction is 60-65 %.   The right atrium appears moderately dilated.   Normal appearing right ventricle structure and function.     TTE Echo Complete w/o contrast w/ Doppler (05.19.20 @ 11:14):   The left ventricle is normal in size, wall motion and contractility. Mild concentric left ventricular hypertrophy is present. Estimated left ventricular ejection fraction is 55-60 %.   The left atrium is moderately dilated.   The right atrium appears moderately dilated.   Normal appearing right ventricle structure and function.   Moderate (2+) mitral regurgitation is present.   Mild to moderate Tricuspid regurgitation is present.   Trace pulmonic valvular regurgitation is present.   No evidence of pericardial effusion.   Pleural effusion cannot be ruled out.    Tele: AF    Culture - Blood in AM (05.20.20 @ 06:41):  Growth in aerobic bottle: Gram Positive Cocci in Clusters.   Staphylococcus aureus

## 2020-05-27 NOTE — PROGRESS NOTE ADULT - PROBLEM SELECTOR PROBLEM 2
Acute on chronic congestive heart failure, unspecified heart failure type
Chronic atrial fibrillation

## 2020-05-27 NOTE — PROGRESS NOTE ADULT - SUBJECTIVE AND OBJECTIVE BOX
Subjective:    pat on PS 10/peep 5, lying comfortably.    Home Medications:  albuterol 2.5 mg/3 mL (0.083%) inhalation solution: 3 milliliter(s) inhaled every 6 hours, As Needed -for shortness of breath and/or wheezing (19 Apr 2020 03:12)  gabapentin 400 mg oral capsule: 1 cap(s) orally 3 times a day (19 Apr 2020 03:12)  pantoprazole 40 mg oral granule, enteric coated: 40 milligram(s) orally once a day (19 Apr 2020 03:12)  Spiriva 18 mcg inhalation capsule: 1 cap(s) inhaled once a day (19 Apr 2020 03:12)  tamsulosin 0.4 mg oral capsule: 1 cap(s) orally once a day (at bedtime) (19 Apr 2020 03:12)  traZODone 50 mg oral tablet: 2 tab(s) orally once a day (at bedtime), As Needed (19 Apr 2020 03:12)    MEDICATIONS  (STANDING):  ALBUTerol    90 MICROgram(s) HFA Inhaler 2 Puff(s) Inhalation every 4 hours  ALPRAZolam 0.5 milliGRAM(s) Oral every 12 hours  aspirin  chewable 81 milliGRAM(s) Oral daily  budesonide 160 MICROgram(s)/formoterol 4.5 MICROgram(s) Inhaler 2 Puff(s) Inhalation two times a day  ceFAZolin   IVPB 2000 milliGRAM(s) IV Intermittent every 8 hours  chlorhexidine 0.12% Liquid 15 milliLiter(s) Oral Mucosa every 12 hours  ciprofloxacin   IVPB      ciprofloxacin   IVPB 400 milliGRAM(s) IV Intermittent every 12 hours  clopidogrel Tablet 75 milliGRAM(s) Oral daily  diltiazem    Tablet 90 milliGRAM(s) Oral every 6 hours  doxazosin 2 milliGRAM(s) Oral at bedtime  enoxaparin Injectable 40 milliGRAM(s) SubCutaneous every 12 hours  gabapentin 400 milliGRAM(s) Oral three times a day  pantoprazole  Injectable 40 milliGRAM(s) IV Push daily  QUEtiapine 50 milliGRAM(s) Oral every 12 hours  thiamine 100 milliGRAM(s) Oral daily  tiotropium 18 MICROgram(s) Capsule 1 Capsule(s) Inhalation daily    MEDICATIONS  (PRN):  acetaminophen   Tablet .. 650 milliGRAM(s) Oral every 6 hours PRN Temp greater or equal to 38.5C (101.3F)  ibuprofen  Tablet. 400 milliGRAM(s) Oral every 6 hours PRN Moderate Pain (4 - 6)  metoclopramide Injectable 10 milliGRAM(s) IV Push every 8 hours PRN Vomiting  oxyCODONE    IR 5 milliGRAM(s) Oral every 6 hours PRN Severe Pain (7 - 10)      Allergies    Ceclor (Rash)  Duricef (Rash)    Intolerances        Vital Signs Last 24 Hrs  T(C): 37.1 (27 May 2020 08:00), Max: 37.3 (27 May 2020 04:00)  T(F): 98.7 (27 May 2020 08:00), Max: 99.2 (27 May 2020 04:00)  HR: 111 (27 May 2020 12:06) (88 - 123)  BP: 105/60 (27 May 2020 12:00) (86/67 - 157/80)  BP(mean): 70 (27 May 2020 12:00) (54 - 97)  RR: 26 (27 May 2020 12:00) (12 - 27)  SpO2: 97% (27 May 2020 12:06) (91% - 100%)  Mode: PS (Pressure Support)/ Spontaneous  FiO2: 30  PEEP: 5  PS: 10  MAP: 8.7  PIP: 16      PHYSICAL EXAMINATION:    NECK:  Supple. No lymphadenopathy. Jugular venous pressure not elevated. Carotids equal.   HEART:   The cardiac impulse has a normal quality. Reg., Nl S1 and S2.  There are no murmurs, rubs or gallops noted  CHEST:  Chest crackles to auscultation. Normal respiratory effort.  ABDOMEN:  Soft and nontender.   EXTREMITIES:  There is no edema.       LABS:                        8.8    7.75  )-----------( 259      ( 27 May 2020 07:26 )             27.4     05-27    139  |  103  |  9   ----------------------------<  105<H>  3.9   |  30  |  0.57    Ca    8.6      27 May 2020 07:26  Phos  3.6     05-27  Mg     1.9     05-27

## 2020-05-27 NOTE — PROGRESS NOTE ADULT - SUBJECTIVE AND OBJECTIVE BOX
[facility] 38  ICU # 33  Vent # 25  Trach and PEG # 11    COVID-19 NEGATIVE x2    66 y/o M admitted on 4/18 with COPD exacerbation -- RRT called on 4/23 for AMS requiring intubation  extubated on 4/26 then re intubated on 4/29.    S/P Trach and PEG on 5/15 and has failed SBT.    Most recent active issues is persistent MSSA sepsis and CRP in sputum--on IV vanco and Cipro.  No central access      5/22:  remains intubated, encephalopathy.  (+) MSSA + in Bcx  5/23:  Tolerating limited PSV - persistent poor mentation.  Repeat BCx sent.  Abx changed to Ancef  5/24:  Rapid AFib O/N.  Still Encephalopathy.  COVID sent.  5/23 BCx still P.        above d/w Dr Dorado who has been caring for the patient.    5/25: cultures from 5/23 remain NGTD - COVID swab again negative on 5/24.    Hemodynamics reasonable.    Remains intubated, NOT sedated, mentation remains poor.  TF in progress.  On Abx.    5/26: for MYA this morning.  Persistent AMS - hemodynamics reasonable.  Remains vent dependent.  Addendum: NO evidence of cardiac vegetations noted by cardiologist during MYA.    5/27: hemodynamics reasonable.  remains vent dependent - only tolerating limited periods of PSV.  d/w Dr Lane this morning.    CXR personally reviewed.    PMHx - chronic AFib w Watchman device, CHF, COPD, HTN, obesity       Allergies    Ceclor (Rash)  Duricef (Rash)      ICU Vital Signs Last 24 Hrs  T(C): 37.1 (27 May 2020 08:00), Max: 37.3 (27 May 2020 04:00)  T(F): 98.7 (27 May 2020 08:00), Max: 99.2 (27 May 2020 04:00)  HR: 102 (27 May 2020 16:01) (86 - 123)  BP: 108/54 (27 May 2020 15:00) (86/67 - 157/80)  BP(mean): 64 (27 May 2020 15:00) (54 - 97)  RR: 33 (27 May 2020 15:00) (15 - 35)  SpO2: 96% (27 May 2020 16:01) (91% - 100%)      Mode: AC/ CMV (Assist Control/ Continuous Mandatory Ventilation)  RR (machine): 15  TV (machine): 450  FiO2: 35  PEEP: 5  MAP: 13  PIP: 25      I&O's Summary    26 May 2020 07:01  -  27 May 2020 07:00  --------------------------------------------------------  IN: 2564 mL / OUT: 3075 mL / NET: -511 mL                              8.8    7.75  )-----------( 259      ( 27 May 2020 07:26 )             27.4       05-27    139  |  103  |  9   ----------------------------<  105<H>  3.9   |  30  |  0.57    Ca    8.6      27 May 2020 07:26  Phos  3.6     05-27  Mg     1.9     05-27      ABG - ( 27 May 2020 05:59 )  pH, Arterial: 7.45  pH, Blood: x     /  pCO2: 44    /  pO2: 104   / HCO3: 30    / Base Excess: 5.9   /  SaO2: 98            MEDICATIONS  (STANDING):  ALBUTerol    90 MICROgram(s) HFA Inhaler 2 Puff(s) Inhalation every 4 hours  ALPRAZolam 0.5 milliGRAM(s) Oral every 12 hours  aspirin  chewable 81 milliGRAM(s) Oral daily  budesonide 160 MICROgram(s)/formoterol 4.5 MICROgram(s) Inhaler 2 Puff(s) Inhalation two times a day  ceFAZolin   IVPB 2000 milliGRAM(s) IV Intermittent every 8 hours  chlorhexidine 0.12% Liquid 15 milliLiter(s) Oral Mucosa every 12 hours  ciprofloxacin   IVPB      ciprofloxacin   IVPB 400 milliGRAM(s) IV Intermittent every 12 hours  clopidogrel Tablet 75 milliGRAM(s) Oral daily  diltiazem    Tablet 90 milliGRAM(s) Oral every 6 hours  doxazosin 2 milliGRAM(s) Oral at bedtime  enoxaparin Injectable 40 milliGRAM(s) SubCutaneous every 12 hours  gabapentin 400 milliGRAM(s) Oral three times a day  pantoprazole  Injectable 40 milliGRAM(s) IV Push daily  QUEtiapine 50 milliGRAM(s) Oral every 12 hours  thiamine 100 milliGRAM(s) Oral daily  tiotropium 18 MICROgram(s) Capsule 1 Capsule(s) Inhalation daily    MEDICATIONS  (PRN):  acetaminophen   Tablet .. 650 milliGRAM(s) Oral every 6 hours PRN Temp greater or equal to 38.5C (101.3F)  ibuprofen  Tablet. 400 milliGRAM(s) Oral every 6 hours PRN Moderate Pain (4 - 6)  metoclopramide Injectable 10 milliGRAM(s) IV Push every 8 hours PRN Vomiting  oxyCODONE    IR 5 milliGRAM(s) Oral every 6 hours PRN Severe Pain (7 - 10)        Review of Systems:   · Additional ROS	Unobtainable due to clinical condition	    Physical Exam:   · Constitutional	detailed exam	  · Constitutional Details	ill; restless but poorly cooperative	  · ENMT	detailed exam	  · ENMT Comments	Trach in situ connected to Vent	  · Respiratory	detailed exam	  · Respiratory Details	breath sounds equal B/L; NO sig W/R/R	  · Cardiovascular	detailed exam	  · Cardiovascular Details	irregular rate and rhythm	  · Gastrointestinal	detailed exam	  · GI Normal	soft; NT/ND (+) BS	  · GI Abnormal	+ PEG in situ; Site is CDI	  · Extremities	detailed exam	  · Extremities Details	no cyanosis; LLE BKA	  · Neurological	detailed exam	  · Neurological Details	Encephalopathy persists; minimally interactive; MARIE spont NO gross motor or sensory deficits	  · Skin	detailed exam	  · Skin Details	warm and dry	        DVT Prophylaxis: Apixaban; venodynes

## 2020-05-27 NOTE — PROGRESS NOTE ADULT - SUBJECTIVE AND OBJECTIVE BOX
Subjective:  No acute events overnight.    Vital Signs:  Vital Signs Last 24 Hrs  T(C): 37.1 (05-27-20 @ 08:00), Max: 37.3 (05-27-20 @ 04:00)  T(F): 98.7 (05-27-20 @ 08:00), Max: 99.2 (05-27-20 @ 04:00)  HR: 111 (05-27-20 @ 12:06) (88 - 123)  BP: 105/60 (05-27-20 @ 12:00) (86/67 - 157/80)  RR: 26 (05-27-20 @ 12:00) (12 - 27)  SpO2: 97% (05-27-20 @ 12:06) (91% - 100%) on (O2)    Telemetry/Alarms: afib    Relevant labs, radiology and Medications reviewed                        8.8    7.75  )-----------( 259      ( 27 May 2020 07:26 )             27.4     05-27    139  |  103  |  9   ----------------------------<  105<H>  3.9   |  30  |  0.57    Ca    8.6      27 May 2020 07:26  Phos  3.6     05-27  Mg     1.9     05-27        MEDICATIONS  (STANDING):  ALBUTerol    90 MICROgram(s) HFA Inhaler 2 Puff(s) Inhalation every 4 hours  ALPRAZolam 0.5 milliGRAM(s) Oral every 12 hours  aspirin  chewable 81 milliGRAM(s) Oral daily  budesonide 160 MICROgram(s)/formoterol 4.5 MICROgram(s) Inhaler 2 Puff(s) Inhalation two times a day  ceFAZolin   IVPB 2000 milliGRAM(s) IV Intermittent every 8 hours  chlorhexidine 0.12% Liquid 15 milliLiter(s) Oral Mucosa every 12 hours  ciprofloxacin   IVPB      ciprofloxacin   IVPB 400 milliGRAM(s) IV Intermittent every 12 hours  clopidogrel Tablet 75 milliGRAM(s) Oral daily  diltiazem    Tablet 90 milliGRAM(s) Oral every 6 hours  doxazosin 2 milliGRAM(s) Oral at bedtime  enoxaparin Injectable 40 milliGRAM(s) SubCutaneous every 12 hours  gabapentin 400 milliGRAM(s) Oral three times a day  pantoprazole  Injectable 40 milliGRAM(s) IV Push daily  QUEtiapine 50 milliGRAM(s) Oral every 12 hours  thiamine 100 milliGRAM(s) Oral daily  tiotropium 18 MICROgram(s) Capsule 1 Capsule(s) Inhalation daily    MEDICATIONS  (PRN):  acetaminophen   Tablet .. 650 milliGRAM(s) Oral every 6 hours PRN Temp greater or equal to 38.5C (101.3F)  ibuprofen  Tablet. 400 milliGRAM(s) Oral every 6 hours PRN Moderate Pain (4 - 6)  metoclopramide Injectable 10 milliGRAM(s) IV Push every 8 hours PRN Vomiting  oxyCODONE    IR 5 milliGRAM(s) Oral every 6 hours PRN Severe Pain (7 - 10)      Physical exam  Gen: NAD  Neuro: AO  Card: S1 S2   Pulm: Course breath sounds, trach site c/d/i  Abd: Soft, PEG site c/d/i  Ext: moderate edema    I&O's Summary    26 May 2020 07:01  -  27 May 2020 07:00  --------------------------------------------------------  IN: 2564 mL / OUT: 3075 mL / NET: -511 mL        Assessment  65y Male  w/ PAST MEDICAL & SURGICAL HISTORY:  Chronic CHF  COPD without exacerbation  Atrial fibrillation  Presence of Watchman left atrial appendage closure device  Hypertension  GERD (gastroesophageal reflux disease)  Chronic obstructive pulmonary disease (COPD)  Anemia  Falls  Meningitis  Collapsed lung  Alcohol withdrawal  Emphysema of lung  Cirrhosis  CHF (congestive heart failure)  Poor historian  Alcohol abuse  Chronic atrial fibrillation  Unilateral amputation of lower extremity below knee  Presence of Watchman left atrial appendage closure device  S/P BKA (below knee amputation) unilateral, left    65M with hypercapnic respiratory failure s/p trach/PEG 2015 & 2017, ETOH, diastolic heart failure, COPD admitted on 4/19/20 with COPD exacerbation complicated by MRSA and pseudomonas pneumonia.    On 5/15/20 patient underwent Bronchoscopy with creation of tracheostomy and insertion of percutaneous endoscopic gastrostomy (PEG) tube  Insertion, nasogastric tube    PLAN  Neuro: Pain management, sedation weaning  Pulm: wean ventilator as tolerated   Cardio: Monitor telemetry/alarms  GI: continue TF to goal  Renal: monitor urine output, supplement electrolytes as needed  Vasc: Eliquis  Heme: Stable H/H. .   ID: continue abx for MRSA pseudomonas in sputum    Discussed with Cardiothoracic Team at AM rounds.

## 2020-05-27 NOTE — PROGRESS NOTE ADULT - ASSESSMENT
PROBLEMS;    s/p staph bactermia/sputum pseudomonas/staph  Ac on chronic hypercapnic & hypoxamic respiratory failure-s/p trach & PEG  sputum-Few Pseudomonas aeruginosa (Carbapenem Resistant)/Moderate Methicillin resistant Staphylococcus aureus  afib with RVR  OHS/VIJAY  AC flare of COPD/chronic vs acute on chronic bronchitis  Mild interstitial lung disease-NSIP h/o smoking  COVID negativex2  Pulmonary hypertension  Nonobstructing stone in the left ureterovesicular junction/ nonobstructing stone in the lower pole right kidney.  Subacute hemorrhage in the right rectus abdominis along the anterior sheath, measures 1.6 cm in thickness and extends from the umbilicus to the inferior myotendinous junction  Cirrhosis  Mild splenomegaly-portal venous hypertension.  Chronic afib w Watchman device  CHF  BPH  GERD  ETOH abuse  seizures disorder    PLAN;    pulmonary slow recovery  vent support-pat multiple failed weaning attempts- s/p trach weaning as tolerated  iv ancef for bactermia/iv cipro for pseudomonas  Tube feeding as tolerated  supportive care  covid repeat testing-neg  Eliquis/asa 81  d/w staff  d/w staff

## 2020-05-27 NOTE — PROGRESS NOTE ADULT - PROBLEM SELECTOR PLAN 1
A MYA was requested to evaluate for cardiac vegetation given persistence of Staph bacteremia and procedure is scheduled for 5/26 at 11:30 am pending COVID19 swab, consent, and continued need from primary service for procedure.
A MYA was requested to evaluate for cardiac vegetation given persistence of Staph bacteremia and procedure is scheduled for 5/26 at 11:30 am pending COVID19 swab, consent, and continued need from primary service for procedure.  Covid swab was sent this AM.
A MYA was requested to evaluate for cardiac vegetation given persistence of Staph bacteremia and procedure is scheduled for 5/26 at 11:30 am. Covid swab was negative.
A MYA was requested to evaluate for cardiac vegetation given persistence of Staph bacteremia and procedure is scheduled for today at 11:30 am. Covid swab was negative.
AF is chronic  bradycardia. Suspend digoxin now, may need reinstitution
AF is chronic; fair ventricular rate control in setting of critical on digoxin and Cardizem -- can change digoxin to enteral route; no anticoagulation needed for AF needed in setting of Watchman device.  Check digoxin level in AM.
AF is chronic;r rate control l on digoxin and Cardizem -- Follow digoxin level
Acute change in mental status  associated with hypercarbia necessitating emergent intubation / mechanical ventilation.
Acute change in mental status earlier today associated with hypercarbia necessitating emergent intubation / mechanical ventilation.
MYA reasonable to exclude valvular vegetation given persistent Staph aureus bacteremia -- I reviewed TTEs and valvular insufficiences (mitral/tricuspid) appear similar on serial studies with no clear vegetation; will need consent fro  his HCP (I called today and there was no answer); he will need COVID19 nasal swab performed 48 hours before procedure (scheduled for 11:30 am on Tuesday 5/26) in accordance with Dept of Cardiology guidelines -- I discussed with Dr John Dorado.
Now sedated and intubated.  reassessment once able to be extubated.
Satisfactory rate control on enteral agents; continue oral cardizem, digoxin; no anticoagulation needed for AF needed in setting of Watchman device.
Satisfactory ventricular rate control -- generally <80 but increases when sedation is weaned and during weaning trials.  S/p past Watchman implantation.
Still Bradycardic on presedex.  Continue to hold dig,
Still not improved post extubation.  Neurology evaluating and following.
Still not improved post extubation.  Neurology evaluating and following.
Suboptimal ventricular rate control with HR often > 100 bpm; increase diltiazem dose to 240mg, past Watchman device; continue antiplt therapy.
arousable ,still confused  On ativan protocol
improved ventricular response , becomes bradycardic on increasing precedex dose .  Continue to hold dig,
mostly due to exacerbation of COPD , still actively wheezing while on IV LASIX   with mild pre renal azotemia , would benefit from aggressive bronchodilator treatment .decrease the lasix as patient developed pre renal azotemia
MYA neg for endocarditis.  Most recent blood cultures neg.  No further evaluation indicated at this time.
arousable ,still confused  On ativan protocol

## 2020-05-27 NOTE — PROGRESS NOTE ADULT - PROBLEM SELECTOR PROBLEM 1
Altered mental status
Chronic atrial fibrillation
MRSA bacteremia
Respiratory distress
MRSA bacteremia
Respiratory distress
Altered mental status

## 2020-05-27 NOTE — PROGRESS NOTE ADULT - ASSESSMENT
64 y/o M admitted on 4/18 with COPD exacerbation -- RRT called on 4/23 for AMS requiring intubation  extubated on 4/26 then re intubated on 4/29.    S/P Trach and PEG on 5/15 and has failed SBT.    Most recent active issues is persistent MSSA sepsis and CRP in sputum--on IV vanco and Cipro.  No central access      pt continues to suffer from:  chronic resp failure requiring ventilator support  TM Encephalopathy   concern for endocarditis as cause of bacteremia with MSSA - NO evidence of vegetations on MYA 5/26.  COVID Negative on 5/24  Blood cultures 5/23: still NGTD  atrial fibrillation RVR - better controlled at present  urinary retention  S/p trach and PEG  MYA 5/27 - NO evidence of cardiac vegetations.    PMHx - chronic AFib w Watchman device, CHF, COPD, HTN, obesity     continues to be at risk for deterioration, morbidity and mortality given current circumstances.    Plan at this time is for continued care in ICU  VAP prevention bundle  SBT as tolerated  IV vanco (5)--stopped on 5/22.  Ancef (6) and Cipro (7) - likely continue 14 days from 5/23 (negative) blood cultures  Xanax 0.5g q 12  Cont ASA for CAD and AFib-- Dilt 90mg q6   Resume Plavix    stopped (5/26) Apixiban - start chemical VTE prophylaxis Lovenox 40mg SC q12h based on BMI  TF  HOB > 30  Cardura for urinary retention  Supportive care  ICU care--d/w ICU staff on multi disciplinary rounds  Discussed with social work - start discharge planning.      Attending Attestation:   40 minutes spent on total encounter; more than 50% of the visit was spent counseling and/or coordinating care by the attending physician.

## 2020-05-28 LAB
ANION GAP SERPL CALC-SCNC: 2 MMOL/L — LOW (ref 5–17)
BUN SERPL-MCNC: 10 MG/DL — SIGNIFICANT CHANGE UP (ref 7–23)
CALCIUM SERPL-MCNC: 8.3 MG/DL — LOW (ref 8.5–10.1)
CHLORIDE SERPL-SCNC: 102 MMOL/L — SIGNIFICANT CHANGE UP (ref 96–108)
CO2 SERPL-SCNC: 31 MMOL/L — SIGNIFICANT CHANGE UP (ref 22–31)
CREAT SERPL-MCNC: 0.47 MG/DL — LOW (ref 0.5–1.3)
CULTURE RESULTS: SIGNIFICANT CHANGE UP
CULTURE RESULTS: SIGNIFICANT CHANGE UP
GLUCOSE SERPL-MCNC: 129 MG/DL — HIGH (ref 70–99)
HCT VFR BLD CALC: 27.4 % — LOW (ref 39–50)
HGB BLD-MCNC: 8.6 G/DL — LOW (ref 13–17)
MAGNESIUM SERPL-MCNC: 1.5 MG/DL — LOW (ref 1.6–2.6)
MCHC RBC-ENTMCNC: 30.7 PG — SIGNIFICANT CHANGE UP (ref 27–34)
MCHC RBC-ENTMCNC: 31.4 GM/DL — LOW (ref 32–36)
MCV RBC AUTO: 97.9 FL — SIGNIFICANT CHANGE UP (ref 80–100)
PHOSPHATE SERPL-MCNC: 3.5 MG/DL — SIGNIFICANT CHANGE UP (ref 2.5–4.5)
PLATELET # BLD AUTO: 236 K/UL — SIGNIFICANT CHANGE UP (ref 150–400)
POTASSIUM SERPL-MCNC: 3.9 MMOL/L — SIGNIFICANT CHANGE UP (ref 3.5–5.3)
POTASSIUM SERPL-SCNC: 3.9 MMOL/L — SIGNIFICANT CHANGE UP (ref 3.5–5.3)
RBC # BLD: 2.8 M/UL — LOW (ref 4.2–5.8)
RBC # FLD: 13.6 % — SIGNIFICANT CHANGE UP (ref 10.3–14.5)
SODIUM SERPL-SCNC: 135 MMOL/L — SIGNIFICANT CHANGE UP (ref 135–145)
SPECIMEN SOURCE: SIGNIFICANT CHANGE UP
SPECIMEN SOURCE: SIGNIFICANT CHANGE UP
WBC # BLD: 7.51 K/UL — SIGNIFICANT CHANGE UP (ref 3.8–10.5)
WBC # FLD AUTO: 7.51 K/UL — SIGNIFICANT CHANGE UP (ref 3.8–10.5)

## 2020-05-28 PROCEDURE — 99232 SBSQ HOSP IP/OBS MODERATE 35: CPT

## 2020-05-28 PROCEDURE — 99233 SBSQ HOSP IP/OBS HIGH 50: CPT

## 2020-05-28 RX ORDER — MAGNESIUM SULFATE 500 MG/ML
2 VIAL (ML) INJECTION ONCE
Refills: 0 | Status: COMPLETED | OUTPATIENT
Start: 2020-05-28 | End: 2020-05-28

## 2020-05-28 RX ADMIN — Medication 0.5 MILLIGRAM(S): at 05:38

## 2020-05-28 RX ADMIN — GABAPENTIN 400 MILLIGRAM(S): 400 CAPSULE ORAL at 22:52

## 2020-05-28 RX ADMIN — GABAPENTIN 400 MILLIGRAM(S): 400 CAPSULE ORAL at 05:39

## 2020-05-28 RX ADMIN — Medication 50 GRAM(S): at 11:01

## 2020-05-28 RX ADMIN — Medication 200 MILLIGRAM(S): at 05:39

## 2020-05-28 RX ADMIN — Medication 90 MILLIGRAM(S): at 05:38

## 2020-05-28 RX ADMIN — PANTOPRAZOLE SODIUM 40 MILLIGRAM(S): 20 TABLET, DELAYED RELEASE ORAL at 12:27

## 2020-05-28 RX ADMIN — Medication 100 MILLIGRAM(S): at 22:51

## 2020-05-28 RX ADMIN — QUETIAPINE FUMARATE 50 MILLIGRAM(S): 200 TABLET, FILM COATED ORAL at 17:20

## 2020-05-28 RX ADMIN — Medication 100 MILLIGRAM(S): at 05:38

## 2020-05-28 RX ADMIN — Medication 90 MILLIGRAM(S): at 17:20

## 2020-05-28 RX ADMIN — ALBUTEROL 2 PUFF(S): 90 AEROSOL, METERED ORAL at 23:40

## 2020-05-28 RX ADMIN — OXYCODONE HYDROCHLORIDE 5 MILLIGRAM(S): 5 TABLET ORAL at 22:52

## 2020-05-28 RX ADMIN — Medication 100 MILLIGRAM(S): at 12:27

## 2020-05-28 RX ADMIN — Medication 90 MILLIGRAM(S): at 12:27

## 2020-05-28 RX ADMIN — ENOXAPARIN SODIUM 40 MILLIGRAM(S): 100 INJECTION SUBCUTANEOUS at 17:20

## 2020-05-28 RX ADMIN — Medication 0.5 MILLIGRAM(S): at 17:20

## 2020-05-28 RX ADMIN — CLOPIDOGREL BISULFATE 75 MILLIGRAM(S): 75 TABLET, FILM COATED ORAL at 12:27

## 2020-05-28 RX ADMIN — BUDESONIDE AND FORMOTEROL FUMARATE DIHYDRATE 2 PUFF(S): 160; 4.5 AEROSOL RESPIRATORY (INHALATION) at 20:21

## 2020-05-28 RX ADMIN — ALBUTEROL 2 PUFF(S): 90 AEROSOL, METERED ORAL at 20:21

## 2020-05-28 RX ADMIN — Medication 81 MILLIGRAM(S): at 12:27

## 2020-05-28 RX ADMIN — Medication 90 MILLIGRAM(S): at 23:09

## 2020-05-28 RX ADMIN — GABAPENTIN 400 MILLIGRAM(S): 400 CAPSULE ORAL at 13:06

## 2020-05-28 RX ADMIN — CHLORHEXIDINE GLUCONATE 15 MILLILITER(S): 213 SOLUTION TOPICAL at 17:20

## 2020-05-28 RX ADMIN — Medication 2 MILLIGRAM(S): at 22:51

## 2020-05-28 RX ADMIN — Medication 100 MILLIGRAM(S): at 13:06

## 2020-05-28 RX ADMIN — ENOXAPARIN SODIUM 40 MILLIGRAM(S): 100 INJECTION SUBCUTANEOUS at 05:38

## 2020-05-28 RX ADMIN — CHLORHEXIDINE GLUCONATE 15 MILLILITER(S): 213 SOLUTION TOPICAL at 05:39

## 2020-05-28 NOTE — PROGRESS NOTE ADULT - SUBJECTIVE AND OBJECTIVE BOX
Hospital # 39  ICU # 34  Vent # 26  Trach and PEG # 12    COVID-19 NEGATIVE x2    66 y/o M admitted on 4/18 with COPD exacerbation -- RRT called on 4/23 for AMS requiring intubation  extubated on 4/26 then re intubated on 4/29.    S/P Trach and PEG on 5/15 and has failed SBT.    Most recent active issues is persistent MSSA sepsis and CRP in sputum--on IV vanco and Cipro.  No central access      5/22:  remains intubated, encephalopathy.  (+) MSSA + in Bcx  5/23:  Tolerating limited PSV - persistent poor mentation.  Repeat BCx sent.  Abx changed to Ancef  5/24:  Rapid AFib O/N.  Still Encephalopathy.  COVID sent.  5/23 BCx still P.        above d/w Dr Dorado who has been caring for the patient.    5/25: cultures from 5/23 remain NGTD - COVID swab again negative on 5/24.    Hemodynamics reasonable.    Remains intubated, NOT sedated, mentation remains poor.  TF in progress.  On Abx.    5/26: for MYA this morning.  Persistent AMS - hemodynamics reasonable.  Remains vent dependent.  Addendum: NO evidence of cardiac vegetations noted by cardiologist during MYA.    5/27: hemodynamics reasonable.  remains vent dependent - only tolerating limited periods of PSV.  d/w Dr Lane this morning.    CXR personally reviewed.    5/28: no new issues, no overnight events - hemodynamics reasonable - tolerating limited PSV.      PMHx - chronic AFib w Watchman device, CHF, COPD, HTN, obesity     Allergies    Ceclor (Rash)  Morenaf (Rash)      ICU Vital Signs Last 24 Hrs  T(C): 38 (28 May 2020 09:00), Max: 38 (28 May 2020 09:00)  T(F): 100.4 (28 May 2020 09:00), Max: 100.4 (28 May 2020 09:00)  HR: 109 (28 May 2020 10:00) (84 - 121)  BP: 136/64 (28 May 2020 10:00) (89/40 - 136/76)  BP(mean): 79 (28 May 2020 10:00) (51 - 87)  RR: 26 (28 May 2020 10:00) (0 - 35)  SpO2: 98% (28 May 2020 10:00) (91% - 100%)      Mode: PS (Pressure Support)/ Spontaneous  FiO2: 40  PEEP: 5  PS: 10  MAP: 9.2  PIP: 15      I&O's Summary    27 May 2020 07:01  -  28 May 2020 07:00  --------------------------------------------------------  IN: 3439 mL / OUT: 100 mL / NET: 3339 mL                              8.6    7.51  )-----------( 236      ( 28 May 2020 07:27 )             27.4       05-28    135  |  102  |  10  ----------------------------<  129<H>  3.9   |  31  |  0.47<L>    Ca    8.3<L>      28 May 2020 07:27  Phos  3.5     05-28  Mg     1.5     05-28      ABG - ( 27 May 2020 05:59 )  pH, Arterial: 7.45  pH, Blood: x     /  pCO2: 44    /  pO2: 104   / HCO3: 30    / Base Excess: 5.9   /  SaO2: 98            MEDICATIONS  (STANDING):  ALBUTerol    90 MICROgram(s) HFA Inhaler 2 Puff(s) Inhalation every 4 hours  ALPRAZolam 0.5 milliGRAM(s) Oral every 12 hours  aspirin  chewable 81 milliGRAM(s) Oral daily  budesonide 160 MICROgram(s)/formoterol 4.5 MICROgram(s) Inhaler 2 Puff(s) Inhalation two times a day  ceFAZolin   IVPB 2000 milliGRAM(s) IV Intermittent every 8 hours  chlorhexidine 0.12% Liquid 15 milliLiter(s) Oral Mucosa every 12 hours  clopidogrel Tablet 75 milliGRAM(s) Oral daily  diltiazem    Tablet 90 milliGRAM(s) Oral every 6 hours  doxazosin 2 milliGRAM(s) Oral at bedtime  enoxaparin Injectable 40 milliGRAM(s) SubCutaneous every 12 hours  gabapentin 400 milliGRAM(s) Oral three times a day  pantoprazole  Injectable 40 milliGRAM(s) IV Push daily  QUEtiapine 50 milliGRAM(s) Oral every 12 hours  thiamine 100 milliGRAM(s) Oral daily  tiotropium 18 MICROgram(s) Capsule 1 Capsule(s) Inhalation daily    MEDICATIONS  (PRN):  acetaminophen   Tablet .. 650 milliGRAM(s) Oral every 6 hours PRN Temp greater or equal to 38.5C (101.3F)  ibuprofen  Tablet. 400 milliGRAM(s) Oral every 6 hours PRN Moderate Pain (4 - 6)  metoclopramide Injectable 10 milliGRAM(s) IV Push every 8 hours PRN Vomiting  oxyCODONE    IR 5 milliGRAM(s) Oral every 6 hours PRN Severe Pain (7 - 10)        Review of Systems:   · Additional ROS	Unobtainable due to clinical condition	    Physical Exam:   · Constitutional	detailed exam	  · Constitutional Details	ill; restless but poorly cooperative	  · ENMT	detailed exam	  · ENMT Comments	Trach in situ connected to Vent	  · Respiratory	detailed exam	  · Respiratory Details	breath sounds equal B/L; NO sig W/R/R	  · Cardiovascular	detailed exam	  · Cardiovascular Details	irregular rate and rhythm	  · Gastrointestinal	detailed exam	  · GI Normal	soft; NT/ND (+) BS	  · GI Abnormal	+ PEG in situ; Site is CDI	  · Extremities	detailed exam	  · Extremities Details	no cyanosis; LLE BKA	  · Neurological	detailed exam	  · Neurological Details	Encephalopathy persists; minimally interactive; MARIE spont NO gross motor or sensory deficits	  · Skin	detailed exam	  · Skin Details	warm and dry	        DVT Prophylaxis: Apixaban; venodynes

## 2020-05-28 NOTE — PHYSICAL THERAPY INITIAL EVALUATION ADULT - GENERAL OBSERVATIONS, REHAB EVAL
pt rec'd supine in bed in ICU, monitors, IVs, vent/TC, PEG, condom cath, L BKA, pt w/ LLE over bed rail-repositioned. intermittent eye contact,

## 2020-05-28 NOTE — PROGRESS NOTE ADULT - SUBJECTIVE AND OBJECTIVE BOX
Subjective:    pat on cpap, awake, no new events.    Home Medications:  albuterol 2.5 mg/3 mL (0.083%) inhalation solution: 3 milliliter(s) inhaled every 6 hours, As Needed -for shortness of breath and/or wheezing (19 Apr 2020 03:12)  gabapentin 400 mg oral capsule: 1 cap(s) orally 3 times a day (19 Apr 2020 03:12)  pantoprazole 40 mg oral granule, enteric coated: 40 milligram(s) orally once a day (19 Apr 2020 03:12)  Spiriva 18 mcg inhalation capsule: 1 cap(s) inhaled once a day (19 Apr 2020 03:12)  tamsulosin 0.4 mg oral capsule: 1 cap(s) orally once a day (at bedtime) (19 Apr 2020 03:12)  traZODone 50 mg oral tablet: 2 tab(s) orally once a day (at bedtime), As Needed (19 Apr 2020 03:12)    MEDICATIONS  (STANDING):  ALBUTerol    90 MICROgram(s) HFA Inhaler 2 Puff(s) Inhalation every 4 hours  ALPRAZolam 0.5 milliGRAM(s) Oral every 12 hours  aspirin  chewable 81 milliGRAM(s) Oral daily  budesonide 160 MICROgram(s)/formoterol 4.5 MICROgram(s) Inhaler 2 Puff(s) Inhalation two times a day  ceFAZolin   IVPB 2000 milliGRAM(s) IV Intermittent every 8 hours  chlorhexidine 0.12% Liquid 15 milliLiter(s) Oral Mucosa every 12 hours  clopidogrel Tablet 75 milliGRAM(s) Oral daily  diltiazem    Tablet 90 milliGRAM(s) Oral every 6 hours  doxazosin 2 milliGRAM(s) Oral at bedtime  enoxaparin Injectable 40 milliGRAM(s) SubCutaneous every 12 hours  gabapentin 400 milliGRAM(s) Oral three times a day  pantoprazole  Injectable 40 milliGRAM(s) IV Push daily  QUEtiapine 50 milliGRAM(s) Oral every 12 hours  thiamine 100 milliGRAM(s) Oral daily  tiotropium 18 MICROgram(s) Capsule 1 Capsule(s) Inhalation daily    MEDICATIONS  (PRN):  acetaminophen   Tablet .. 650 milliGRAM(s) Oral every 6 hours PRN Temp greater or equal to 38.5C (101.3F)  ibuprofen  Tablet. 400 milliGRAM(s) Oral every 6 hours PRN Moderate Pain (4 - 6)  metoclopramide Injectable 10 milliGRAM(s) IV Push every 8 hours PRN Vomiting  oxyCODONE    IR 5 milliGRAM(s) Oral every 6 hours PRN Severe Pain (7 - 10)      Allergies    Ceclor (Rash)  Duricef (Rash)    Intolerances        Vital Signs Last 24 Hrs  T(C): 38 (28 May 2020 09:00), Max: 38 (28 May 2020 09:00)  T(F): 100.4 (28 May 2020 09:00), Max: 100.4 (28 May 2020 09:00)  HR: 121 (28 May 2020 12:00) (84 - 121)  BP: 127/100 (28 May 2020 12:00) (89/40 - 136/76)  BP(mean): 107 (28 May 2020 12:00) (51 - 107)  RR: 31 (28 May 2020 12:00) (0 - 35)  SpO2: 96% (28 May 2020 12:00) (91% - 100%)  Mode: PS (Pressure Support)/ Spontaneous  FiO2: 30  PEEP: 5  PS: 10  MAP: 8.8  PIP: 16      PHYSICAL EXAMINATION:    NECK:  Supple. No lymphadenopathy. Jugular venous pressure not elevated. Carotids equal.   HEART:   The cardiac impulse has a normal quality. Reg., Nl S1 and S2.  There are no murmurs, rubs or gallops noted  CHEST:  Chest crackles to auscultation. Normal respiratory effort.  ABDOMEN:  Soft and nontender.   EXTREMITIES:  There is no edema.       LABS:                        8.6    7.51  )-----------( 236      ( 28 May 2020 07:27 )             27.4     05-28    135  |  102  |  10  ----------------------------<  129<H>  3.9   |  31  |  0.47<L>    Ca    8.3<L>      28 May 2020 07:27  Phos  3.5     05-28  Mg     1.5     05-28

## 2020-05-28 NOTE — PROGRESS NOTE ADULT - ASSESSMENT
66 y/o male with h/o chronic A.Fib with Watchman device, CHF, COPD, HTN, obesity was admitted on 4/18 for worsening SOB. In ER he was found with rapid A.fib and was placed on NRB. He tested COVID-19 PCR negative x 3. Noted with hypercapnea and placed on BiPAP, then intubated and placed on ventilatory support. He was extubated on 4/26. He is reported with MDRO's in sputum c/s. He was treated with azithromycin from 4/20 to 4/25.     1. Low grade fever. Persistent sepsis with MSSA. Right arm cellulitis/ phlebitis. Acute respiratory failure. 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 Allergy to PCN and cephalosporines. Possible COVID-19 syndrome.   -still with low grade fever  -MYA no evidence of valvular vegetations or vegetation on Watchman device; doubt endocarditis  -encephalopathy   -cultures reviewed; repeat sputum c/s shows CRE-PSAE  -s/p cipro IV # 9  -s/p vancomycin 1 gm IV q12h # 10 days until 5/7  -respiratory care  -repeat BC x 2 are negative to date  -s/p vancomycin 1 gm IV q12h # 4 days  -on cefazolin 2 gm iV q8h # 7 and cipro IV # 7  -tolerating abx well so far; no side effects noted  -he had prior CRE-PSAE; new sputum culture shows PSAE again; CXR dose not show infiltrates  -f/u closely for rashes  -d/c cipro  -repeat BC are negative to date  -monitor closely in tod of duricef allergy history  -continue abx coverage with cefazolin  -cardiology evaluation appreciated  -repeat BC x 2  -monitor temps  -f/u CBC  -supportive care  2. Other issues:   -care per medicine

## 2020-05-28 NOTE — PROGRESS NOTE ADULT - ASSESSMENT
64 y/o M admitted on 4/18 with COPD exacerbation -- RRT called on 4/23 for AMS requiring intubation  extubated on 4/26 then re intubated on 4/29.    S/P Trach and PEG on 5/15 and has failed SBT.    Most recent active issues is persistent MSSA sepsis and CRP in sputum--on IV vanco and Cipro.  No central access      pt continues to suffer from:  chronic resp failure requiring ventilator support  TM Encephalopathy   concern for endocarditis as cause of bacteremia with MSSA - NO evidence of vegetations on MAY 5/26.  COVID Negative on 5/24  Blood cultures 5/23: still NGTD  atrial fibrillation RVR - better controlled at present  urinary retention  S/p trach and PEG  MYA 5/27 - NO evidence of cardiac vegetations.    PMHx - chronic AFib w Watchman device, CHF, COPD, HTN, obesity     continues to be at risk for deterioration, morbidity and mortality given current circumstances.    Plan at this time is for continued care in ICU  VAP prevention bundle  SBT as tolerated  IV vanco, IV Cipro- currently on Ancef (6) - likely continue 14 days from 5/23 (negative) blood cultures  Xanax 0.5g q 12  Cont ASA for CAD and AFib-- Dilt 90mg q6   Plavix    stopped (5/26) Apixiban - No need with Watchman device  chemical VTE prophylaxis Lovenox 40mg SC q12h based on BMI  TF  HOB > 30  Cardura for urinary retention  Supportive care  ICU care--d/w ICU staff on multi disciplinary rounds  Discussed with social work - start discharge planning.      Attending Attestation:   40 minutes spent on total encounter; more than 50% of the visit was spent counseling and/or coordinating care by the attending physician.

## 2020-05-28 NOTE — PHARMACOTHERAPY INTERVENTION NOTE - OUTCOME
accepted

## 2020-05-28 NOTE — PROGRESS NOTE ADULT - SUBJECTIVE AND OBJECTIVE BOX
Subjective:  Pt seen, on CPAP.    Vital Signs:  Vital Signs Last 24 Hrs  T(C): 38 (05-28-20 @ 09:00), Max: 38 (05-28-20 @ 09:00)  T(F): 100.4 (05-28-20 @ 09:00), Max: 100.4 (05-28-20 @ 09:00)  HR: 109 (05-28-20 @ 10:00) (84 - 121)  BP: 136/64 (05-28-20 @ 10:00) (89/40 - 136/76)  RR: 26 (05-28-20 @ 10:00) (0 - 35)  SpO2: 98% (05-28-20 @ 10:00) (91% - 100%) on (O2)    Telemetry/Alarms:    Relevant labs, radiology and Medications reviewed                        8.6    7.51  )-----------( 236      ( 28 May 2020 07:27 )             27.4     05-28    135  |  102  |  10  ----------------------------<  129<H>  3.9   |  31  |  0.47<L>    Ca    8.3<L>      28 May 2020 07:27  Phos  3.5     05-28  Mg     1.5     05-28        MEDICATIONS  (STANDING):  ALBUTerol    90 MICROgram(s) HFA Inhaler 2 Puff(s) Inhalation every 4 hours  ALPRAZolam 0.5 milliGRAM(s) Oral every 12 hours  aspirin  chewable 81 milliGRAM(s) Oral daily  budesonide 160 MICROgram(s)/formoterol 4.5 MICROgram(s) Inhaler 2 Puff(s) Inhalation two times a day  ceFAZolin   IVPB 2000 milliGRAM(s) IV Intermittent every 8 hours  chlorhexidine 0.12% Liquid 15 milliLiter(s) Oral Mucosa every 12 hours  clopidogrel Tablet 75 milliGRAM(s) Oral daily  diltiazem    Tablet 90 milliGRAM(s) Oral every 6 hours  doxazosin 2 milliGRAM(s) Oral at bedtime  enoxaparin Injectable 40 milliGRAM(s) SubCutaneous every 12 hours  gabapentin 400 milliGRAM(s) Oral three times a day  magnesium sulfate  IVPB 2 Gram(s) IV Intermittent once  pantoprazole  Injectable 40 milliGRAM(s) IV Push daily  QUEtiapine 50 milliGRAM(s) Oral every 12 hours  thiamine 100 milliGRAM(s) Oral daily  tiotropium 18 MICROgram(s) Capsule 1 Capsule(s) Inhalation daily    MEDICATIONS  (PRN):  acetaminophen   Tablet .. 650 milliGRAM(s) Oral every 6 hours PRN Temp greater or equal to 38.5C (101.3F)  ibuprofen  Tablet. 400 milliGRAM(s) Oral every 6 hours PRN Moderate Pain (4 - 6)  metoclopramide Injectable 10 milliGRAM(s) IV Push every 8 hours PRN Vomiting  oxyCODONE    IR 5 milliGRAM(s) Oral every 6 hours PRN Severe Pain (7 - 10)      Physical exam  Gen NAD  Neuro awake  neck trach midline  Card RRR  Pulm BS/bl  Abd soft  Ext warm        I&O's Summary    27 May 2020 07:01  -  28 May 2020 07:00  --------------------------------------------------------  IN: 3439 mL / OUT: 100 mL / NET: 3339 mL        Assessment  65y Male  w/ PAST MEDICAL & SURGICAL HISTORY:  Chronic CHF  COPD without exacerbation  Atrial fibrillation  Presence of Watchman left atrial appendage closure device  Hypertension  GERD (gastroesophageal reflux disease)  Chronic obstructive pulmonary disease (COPD)  Anemia  Falls  Meningitis  Collapsed lung  Alcohol withdrawal  Emphysema of lung  Cirrhosis  CHF (congestive heart failure)  Poor historian  Alcohol abuse  Chronic atrial fibrillation  Unilateral amputation of lower extremity below knee  Presence of Watchman left atrial appendage closure device  S/P BKA (below knee amputation) unilateral, left  admitted with complaints of Patient is a 65y old  Male who presents with a chief complaint of acute hypoxemic, hypercapneic resp failure  COPD exacerbation (28 May 2020 10:13)  .  66 y/o M with above PM history and hypercapnic respiratory failure Trach/PEG in 2015 and 2017,  now intubated from hypercapnic respiratory failure , COVID neg. S/P Trach and PEG 5/15. Now w Staph bacteremia and pseudomonas in sputum. 5/22 Started on Eliquis. MYA 5/26 negative for vegetations.    cont CPAP trials as per ICU  awaiting placement  trach care    Discussed with Cardiothoracic Team at AM rounds.

## 2020-05-28 NOTE — PROGRESS NOTE ADULT - ASSESSMENT
PROBLEMS;    s/p staph bactermia/sputum pseudomonas/staph  Ac on chronic hypercapnic & hypoxamic respiratory failure-s/p trach & PEG  sputum-Few Pseudomonas aeruginosa (Carbapenem Resistant)/Moderate Methicillin resistant Staphylococcus aureus  afib with RVR  OHS/VIJAY  AC flare of COPD/chronic vs acute on chronic bronchitis  Mild interstitial lung disease-NSIP h/o smoking  COVID negativex2  Pulmonary hypertension  Nonobstructing stone in the left ureterovesicular junction/ nonobstructing stone in the lower pole right kidney.  Subacute hemorrhage in the right rectus abdominis along the anterior sheath, measures 1.6 cm in thickness and extends from the umbilicus to the inferior myotendinous junction  Cirrhosis  Mild splenomegaly-portal venous hypertension.  Chronic afib w Watchman device  CHF  BPH  GERD  ETOH abuse  seizures disorder    PLAN;    pulmonary slow recovery- possible placement for rehab  vent support-pat multiple failed weaning attempts- s/p trach weaning as tolerated  iv ancef for bactermia/iv cipro for pseudomonas  Tube feeding as tolerated  supportive care  covid repeat testing-neg  Eliquis/asa 81  d/w staff  d/w staff

## 2020-05-28 NOTE — PROGRESS NOTE ADULT - SUBJECTIVE AND OBJECTIVE BOX
Date of service: 05-28-20 @ 10:14    Lying in bed in NAD  Has low grade fever  Trach and PEG    ROS: unobtainable    MEDICATIONS  (STANDING):  ALBUTerol    90 MICROgram(s) HFA Inhaler 2 Puff(s) Inhalation every 4 hours  ALPRAZolam 0.5 milliGRAM(s) Oral every 12 hours  aspirin  chewable 81 milliGRAM(s) Oral daily  budesonide 160 MICROgram(s)/formoterol 4.5 MICROgram(s) Inhaler 2 Puff(s) Inhalation two times a day  ceFAZolin   IVPB 2000 milliGRAM(s) IV Intermittent every 8 hours  chlorhexidine 0.12% Liquid 15 milliLiter(s) Oral Mucosa every 12 hours  ciprofloxacin   IVPB      ciprofloxacin   IVPB 400 milliGRAM(s) IV Intermittent every 12 hours  clopidogrel Tablet 75 milliGRAM(s) Oral daily  diltiazem    Tablet 90 milliGRAM(s) Oral every 6 hours  doxazosin 2 milliGRAM(s) Oral at bedtime  enoxaparin Injectable 40 milliGRAM(s) SubCutaneous every 12 hours  gabapentin 400 milliGRAM(s) Oral three times a day  magnesium sulfate  IVPB 2 Gram(s) IV Intermittent once  pantoprazole  Injectable 40 milliGRAM(s) IV Push daily  QUEtiapine 50 milliGRAM(s) Oral every 12 hours  thiamine 100 milliGRAM(s) Oral daily  tiotropium 18 MICROgram(s) Capsule 1 Capsule(s) Inhalation daily      Vital Signs Last 24 Hrs  T(C): 38 (28 May 2020 09:00), Max: 38 (28 May 2020 09:00)  T(F): 100.4 (28 May 2020 09:00), Max: 100.4 (28 May 2020 09:00)  HR: 109 (28 May 2020 10:00) (84 - 121)  BP: 136/64 (28 May 2020 10:00) (89/40 - 136/76)  BP(mean): 79 (28 May 2020 10:00) (51 - 87)  RR: 26 (28 May 2020 10:00) (0 - 35)  SpO2: 98% (28 May 2020 10:00) (91% - 100%)    Mode: PS (Pressure Support)/ Spontaneous, FiO2: 40, PEEP: 5, PS: 10, MAP: 9.2, PIP: 15    Physical exam:    Constitutional:  No acute distress  HEENT: NC/AT, EOMI, PERRLA, conjunctivae clear  Neck: supple; thyroid not palpable  Back: no tenderness  Respiratory: respiratory effort normal; b/l rhonchi  Cardiovascular: S1S2 regular, no murmurs  Abdomen: soft, not tender, not distended, positive BS  Genitourinary: no suprapubic tenderness  Lymphatic: no LN palpable  Musculoskeletal: no muscle tenderness, no joint swelling or tenderness  Extremities: no pedal edema  Right arm area of erythema with phlebitis - resolving  Neurological/ Psychiatric: confused; moving all extremities  Skin: no rashes; no palpable lesions    Labs: reviewed                        8.6    7.51  )-----------( 236      ( 28 May 2020 07:27 )             27.4     05-28    135  |  102  |  10  ----------------------------<  129<H>  3.9   |  31  |  0.47<L>    Ca    8.3<L>      28 May 2020 07:27  Phos  3.5     05-28  Mg     1.5     05-28                        8.4    5.89  )-----------( 213      ( 25 May 2020 07:31 )             27.0     05-25    142  |  106  |  9   ----------------------------<  125<H>  3.7   |  32<H>  |  0.47<L>    Ca    8.5      25 May 2020 07:31  Phos  3.5     05-25  Mg     1.4     05-25               Vancomycin Level, Trough: 9.8 ug/mL (05-21 @ 04:52)                                  12.5   8.31  )-----------( 197      ( 07 May 2020 08:15 )             37.6     05-07    137  |  100  |  33<H>  ----------------------------<  114<H>  4.9   |  31  |  0.83    Ca    8.5      07 May 2020 08:15  Phos  4.2     05-07  Mg     2.4     05-07      COVID-19 PCR . (04.26.20 @ 00:02)    COVID-19 PCR: NotDetec      Culture - Blood (collected 26 Apr 2020 01:01)  Source: .Blood None  Preliminary Report (27 Apr 2020 10:02):    No growth to date.    Culture - Blood (collected 26 Apr 2020 00:56)  Source: .Blood None  Preliminary Report (27 Apr 2020 10:02):    No growth to date.    Culture - Sputum (collected 25 Apr 2020 01:00)  Source: .Sputum Sputum  trap  Gram Stain (26 Apr 2020 12:29):    Moderate polymorphonuclear leukocytes per low power field    No Squamous epithelial cells per low power field    Rare Gram Positive Rods per oil power field    Rare Gram Positive Cocci in Pairs and Chains per oil power field  Final Report (28 Apr 2020 10:53):    Few Pseudomonas aeruginosa (Carbapenem Resistant)    Moderate Methicillin resistant Staphylococcus aureus    Normal Respiratory Faith present  Organism: Pseudomonas aeruginosa (Carbapenem Resistant)  Methicillin resistant Staphylococcus aureus (28 Apr 2020 10:44)  Organism: Pseudomonas aeruginosa (Carbapenem Resistant) (28 Apr 2020 10:44)      -  Amikacin: S <=16      -  Aztreonam: R >16      -  Cefepime: R >16      -  Ceftazidime: R >16      -  Ciprofloxacin: S <=0.25      -  Gentamicin: S 4      -  Imipenem: R >8      -  Levofloxacin: S <=0.5      -  Meropenem: R >8      -  Piperacillin/Tazobactam: R >64      -  Tobramycin: S <=2      Method Type: LADY  Organism: Methicillin resistant Staphylococcus aureus (28 Apr 2020 10:40)      -  Amoxicillin/Clavulanic Acid: R >4/2      -  Ampicillin: R >8      -  Ampicillin/Sulbactam: R <=8/4      -  Cefazolin: R >16      -  Ceftriaxone: R >32      -  Ciprofloxacin: R >2      -  Clindamycin: R >4      -  Daptomycin: S 0.5      -  Erythromycin: R >4      -  Gentamicin: S <=1 Should not be used as monotherapy      -  Levofloxacin: R >4      -  Linezolid: S 4      -  Meropenem: R >8      -  Moxifloxacin(Aerobic): R >4      -  Oxacillin: R >2      -  Penicillin: R >8      -  RIF- Rifampin: S <=1 Should not be used as monotherapy      -  Tetra/Doxy: S 2      -  Trimethoprim/Sulfamethoxazole: S <=0.5/9.5      -  Vancomycin: S 2      Method Type: LADY    Culture - Urine (collected 21 Apr 2020 15:51)  Source: .Urine Clean Catch (Midstream)  Final Report (22 Apr 2020 17:03):    <10,000 CFU/mL Normal Urogenital Faith    Culture - Blood (collected 18 Apr 2020 19:43)  Source: .Blood Blood-Peripheral  Final Report (24 Apr 2020 01:01):    No Growth Final    Culture - Blood (05.18.20 @ 11:44)    -  Staphylococcus aureus: Detec Any isolate of Staphylococcus aureus from a blood culture is NOT considered a contaminant.    -  RIF- Rifampin: S <=1 Should not be used as monotherapy    -  Penicillin: R >8    -  Oxacillin: S <=0.25    -  Tetra/Doxy: S <=1    -  Trimethoprim/Sulfamethoxazole: S <=0.5/9.5    -  Vancomycin: S 2    -  Clindamycin: R 0.5 This isolate is presumed to be clindamycin resistant based on detection of inducible resistance. Clindamycin may still be effective in some patients.    -  Gentamicin: S <=1 Should not be used as monotherapy    -  Erythromycin: R >4    -  Ampicillin/Sulbactam: S <=8/4    -  Cefazolin: S <=4    Gram Stain:   Growth in aerobic bottle: Gram Positive Cocci in Clusters  Growth in anaerobic bottle: Gram Positive Cocci in Clusters    Specimen Source: .Blood None    Organism: Staphylococcus aureus    Organism: Staphylococcus aureus    Culture Results:   Growth in aerobic and anaerobic bottles: Staphylococcus aureus    Organism Identification: Staphylococcus aureus  Staphylococcus aureus    Method Type: LADY    Method Type: PCR        Culture - Sputum . (05.18.20 @ 09:45)    -  Tobramycin: S <=2    -  Piperacillin/Tazobactam: R >64    -  Meropenem: R >8    -  Levofloxacin: S <=0.5    Gram Stain:   Numerous polymorphonuclear leukocytes per low power field  No Squamous epithelial cells per low power field  Numerous Gram Negative Rods per oil power field    -  Amikacin: S <=16    -  Cefepime: R >16    -  Aztreonam: R >16    -  Imipenem: R >8    -  Gentamicin: S 4    -  Ciprofloxacin: S <=0.25    -  Ceftazidime: R >16    Specimen Source: .Sputum Sputums trap    Culture Results:   Numerous Pseudomonas aeruginosa (Carbapenem Resistant)  Normal Respiratory Faith absent    Organism Identification: Pseudomonas aeruginosa (Carbapenem Resistant)    Organism: Pseudomonas aeruginosa (Carbapenem Resistant)    Method Type: LADY    Culture - Blood in AM (05.20.20 @ 06:41)    Gram Stain:   Growth in aerobic bottle: Gram Positive Cocci in Clusters  Growth in anaerobic bottle: Gram Positive Cocci in Clusters    Specimen Source: .Blood None    Culture Results:   Growth in aerobic and anaerobic bottles: Staphylococcus aureus  See previous culture [id number]    Culture - Blood (05.23.20 @ 07:07)    Specimen Source: .Blood None    Culture Results:   No growth to date.    COVID-19 PCR . (05.14.20 @ 08:10)    COVID-19 PCR: NotDetec    Radiology: all available radiological tests reviewed    < from: Xray Chest 1 View- PORTABLE-Urgent (05.18.20 @ 09:49) >  A tracheostomy tube is noted.  Lungs: There are no lung consolidations.  Heart: There is cardiomegaly.  Mediastinum: The mediastinum is within normal limits.    < end of copied text >    < from: MYA Complete w/Spect and Color (05.26.20 @ 13:17) >   Mitral Valve   Structurally normalmitral valve. Moderate mitral regurgitation, no   stenosis. No evidence of vegetations.     Aortic Valve   Structurally normal trileaflet aortic valve. No aortic regurgitation or   stenosis. No evidence of vegetations.     Tricuspid Valve   Structurally normal tricuspid valve. Moderate tricuspid regurgitation, no   stenosis. No evidence of vegetations.     Pulmonic Valve   Structurally normal pulmonic valve. Mild pulmonic regurgitation, no   stenosis. No evidence of vegetations.     Left Atrium  Moderate left atrial dilation.   Left atrial appendage occlusion device ("Watchman device") well seated in   left atrial appendage. No evidence of vegetation on this device.    < end of copied text >      Advanced directives addressed: full resuscitation

## 2020-05-28 NOTE — PHYSICAL THERAPY INITIAL EVALUATION ADULT - PRECAUTIONS/LIMITATIONS, REHAB EVAL
seizure precautions/cardiac precautions/isolation precautions/fall precautions/oxygen therapy device and L/min

## 2020-05-28 NOTE — PHYSICAL THERAPY INITIAL EVALUATION ADULT - ACTIVE RANGE OF MOTION EXAMINATION, REHAB EVAL
observed active mvt LLE-tends to put up on bedrail, RLE-WDs w/ stim to foot, ?squeezed PT's hand 1x L, + ext mm activity B UE-propping up on elbows slightly (pt does move all extremities per nsg as pt scoots in bed and requires freq repositioning)

## 2020-05-28 NOTE — PHYSICAL THERAPY INITIAL EVALUATION ADULT - DIAGNOSIS, PT EVAL
acute hypoxemic, hypercapneic resp failure, COPD exacerb, sepsis bacteremia,encephalopathy, vent dependent

## 2020-05-28 NOTE — PHARMACOTHERAPY INTERVENTION NOTE - COMMENTS
ANUSHKA. already received lovenox today
Pt on tube feeding, recommend to stop lactate ringer
Recommend to dc midodrine
Recommend to restart asa after trach
Recommend to restart plavix
a1c
a1c = 6.2 and has not required coverage
no longer running
no longer running
per progress note
reintubated
Pt has persistent MSSA bacteremia on vancomycin. Allergic to duricef with rash, discussed with Dr. Best to give ancef [id number] as it has better efficacy compared to vancomycin. Will closely monitor rash
Pt rec'd 7 days of ciprofloxacin for CRE in sputum, recommend to stop abx
added hold parameters
completed extended course for MRSA PNA with LADY of 2
de-escalate on steroids
initially started due to suspicion of etoh withdrawal

## 2020-05-29 LAB
ANION GAP SERPL CALC-SCNC: 2 MMOL/L — LOW (ref 5–17)
BUN SERPL-MCNC: 9 MG/DL — SIGNIFICANT CHANGE UP (ref 7–23)
CALCIUM SERPL-MCNC: 8.9 MG/DL — SIGNIFICANT CHANGE UP (ref 8.5–10.1)
CHLORIDE SERPL-SCNC: 102 MMOL/L — SIGNIFICANT CHANGE UP (ref 96–108)
CO2 SERPL-SCNC: 32 MMOL/L — HIGH (ref 22–31)
CREAT SERPL-MCNC: 0.49 MG/DL — LOW (ref 0.5–1.3)
GLUCOSE SERPL-MCNC: 106 MG/DL — HIGH (ref 70–99)
HCT VFR BLD CALC: 27.8 % — LOW (ref 39–50)
HGB BLD-MCNC: 9 G/DL — LOW (ref 13–17)
MAGNESIUM SERPL-MCNC: 1.7 MG/DL — SIGNIFICANT CHANGE UP (ref 1.6–2.6)
MCHC RBC-ENTMCNC: 31.7 PG — SIGNIFICANT CHANGE UP (ref 27–34)
MCHC RBC-ENTMCNC: 32.4 GM/DL — SIGNIFICANT CHANGE UP (ref 32–36)
MCV RBC AUTO: 97.9 FL — SIGNIFICANT CHANGE UP (ref 80–100)
PHOSPHATE SERPL-MCNC: 3.8 MG/DL — SIGNIFICANT CHANGE UP (ref 2.5–4.5)
PLATELET # BLD AUTO: 249 K/UL — SIGNIFICANT CHANGE UP (ref 150–400)
POTASSIUM SERPL-MCNC: 3.9 MMOL/L — SIGNIFICANT CHANGE UP (ref 3.5–5.3)
POTASSIUM SERPL-SCNC: 3.9 MMOL/L — SIGNIFICANT CHANGE UP (ref 3.5–5.3)
RBC # BLD: 2.84 M/UL — LOW (ref 4.2–5.8)
RBC # FLD: 13.5 % — SIGNIFICANT CHANGE UP (ref 10.3–14.5)
SODIUM SERPL-SCNC: 136 MMOL/L — SIGNIFICANT CHANGE UP (ref 135–145)
WBC # BLD: 7.37 K/UL — SIGNIFICANT CHANGE UP (ref 3.8–10.5)
WBC # FLD AUTO: 7.37 K/UL — SIGNIFICANT CHANGE UP (ref 3.8–10.5)

## 2020-05-29 PROCEDURE — 99232 SBSQ HOSP IP/OBS MODERATE 35: CPT

## 2020-05-29 RX ORDER — CHLORHEXIDINE GLUCONATE 213 G/1000ML
15 SOLUTION TOPICAL EVERY 12 HOURS
Refills: 0 | Status: DISCONTINUED | OUTPATIENT
Start: 2020-05-29 | End: 2020-06-01

## 2020-05-29 RX ADMIN — QUETIAPINE FUMARATE 50 MILLIGRAM(S): 200 TABLET, FILM COATED ORAL at 05:41

## 2020-05-29 RX ADMIN — Medication 0.5 MILLIGRAM(S): at 17:03

## 2020-05-29 RX ADMIN — Medication 100 MILLIGRAM(S): at 13:51

## 2020-05-29 RX ADMIN — Medication 81 MILLIGRAM(S): at 11:43

## 2020-05-29 RX ADMIN — Medication 100 MILLIGRAM(S): at 11:44

## 2020-05-29 RX ADMIN — Medication 2 MILLIGRAM(S): at 22:10

## 2020-05-29 RX ADMIN — Medication 90 MILLIGRAM(S): at 05:41

## 2020-05-29 RX ADMIN — ALBUTEROL 2 PUFF(S): 90 AEROSOL, METERED ORAL at 04:51

## 2020-05-29 RX ADMIN — CHLORHEXIDINE GLUCONATE 15 MILLILITER(S): 213 SOLUTION TOPICAL at 17:04

## 2020-05-29 RX ADMIN — GABAPENTIN 400 MILLIGRAM(S): 400 CAPSULE ORAL at 22:10

## 2020-05-29 RX ADMIN — PANTOPRAZOLE SODIUM 40 MILLIGRAM(S): 20 TABLET, DELAYED RELEASE ORAL at 11:43

## 2020-05-29 RX ADMIN — CLOPIDOGREL BISULFATE 75 MILLIGRAM(S): 75 TABLET, FILM COATED ORAL at 11:44

## 2020-05-29 RX ADMIN — Medication 90 MILLIGRAM(S): at 12:03

## 2020-05-29 RX ADMIN — Medication 650 MILLIGRAM(S): at 05:41

## 2020-05-29 RX ADMIN — ENOXAPARIN SODIUM 40 MILLIGRAM(S): 100 INJECTION SUBCUTANEOUS at 17:03

## 2020-05-29 RX ADMIN — Medication 90 MILLIGRAM(S): at 17:03

## 2020-05-29 RX ADMIN — Medication 100 MILLIGRAM(S): at 22:10

## 2020-05-29 RX ADMIN — OXYCODONE HYDROCHLORIDE 5 MILLIGRAM(S): 5 TABLET ORAL at 22:10

## 2020-05-29 RX ADMIN — Medication 90 MILLIGRAM(S): at 23:23

## 2020-05-29 RX ADMIN — CHLORHEXIDINE GLUCONATE 15 MILLILITER(S): 213 SOLUTION TOPICAL at 05:41

## 2020-05-29 RX ADMIN — GABAPENTIN 400 MILLIGRAM(S): 400 CAPSULE ORAL at 05:41

## 2020-05-29 RX ADMIN — Medication 0.5 MILLIGRAM(S): at 05:41

## 2020-05-29 RX ADMIN — ENOXAPARIN SODIUM 40 MILLIGRAM(S): 100 INJECTION SUBCUTANEOUS at 05:40

## 2020-05-29 RX ADMIN — Medication 100 MILLIGRAM(S): at 05:40

## 2020-05-29 RX ADMIN — GABAPENTIN 400 MILLIGRAM(S): 400 CAPSULE ORAL at 13:45

## 2020-05-29 RX ADMIN — QUETIAPINE FUMARATE 50 MILLIGRAM(S): 200 TABLET, FILM COATED ORAL at 17:03

## 2020-05-29 NOTE — PROGRESS NOTE ADULT - SUBJECTIVE AND OBJECTIVE BOX
Subjective:  No acute events over night.    Vital Signs:  Vital Signs Last 24 Hrs  T(C): 37.3 (05-29-20 @ 09:00), Max: 38.3 (05-29-20 @ 05:00)  T(F): 99.2 (05-29-20 @ 09:00), Max: 100.9 (05-29-20 @ 05:00)  HR: 94 (05-29-20 @ 13:00) (79 - 127)  BP: 106/54 (05-29-20 @ 13:00) (104/58 - 144/108)  RR: 33 (05-29-20 @ 13:00) (0 - 33)  SpO2: 99% (05-29-20 @ 13:00) (88% - 100%) on 40% FiO2 PEEP 5    Telemetry/Alarms: sinus rhythm    Relevant labs, radiology and Medications reviewed                        9.0    7.37  )-----------( 249      ( 29 May 2020 06:45 )             27.8     05-29    136  |  102  |  9   ----------------------------<  106<H>  3.9   |  32<H>  |  0.49<L>    Ca    8.9      29 May 2020 06:45  Phos  3.8     05-29  Mg     1.7     05-29        MEDICATIONS  (STANDING):  ALBUTerol    90 MICROgram(s) HFA Inhaler 2 Puff(s) Inhalation every 4 hours  ALPRAZolam 0.5 milliGRAM(s) Oral every 12 hours  aspirin  chewable 81 milliGRAM(s) Oral daily  budesonide 160 MICROgram(s)/formoterol 4.5 MICROgram(s) Inhaler 2 Puff(s) Inhalation two times a day  ceFAZolin   IVPB 2000 milliGRAM(s) IV Intermittent every 8 hours  chlorhexidine 0.12% Liquid 15 milliLiter(s) Oral Mucosa every 12 hours  clopidogrel Tablet 75 milliGRAM(s) Oral daily  diltiazem    Tablet 90 milliGRAM(s) Oral every 6 hours  doxazosin 2 milliGRAM(s) Oral at bedtime  enoxaparin Injectable 40 milliGRAM(s) SubCutaneous every 12 hours  gabapentin 400 milliGRAM(s) Oral three times a day  pantoprazole  Injectable 40 milliGRAM(s) IV Push daily  QUEtiapine 50 milliGRAM(s) Oral every 12 hours  thiamine 100 milliGRAM(s) Oral daily  tiotropium 18 MICROgram(s) Capsule 1 Capsule(s) Inhalation daily    MEDICATIONS  (PRN):  acetaminophen   Tablet .. 650 milliGRAM(s) Oral every 6 hours PRN Temp greater or equal to 38.5C (101.3F)  ibuprofen  Tablet. 400 milliGRAM(s) Oral every 6 hours PRN Moderate Pain (4 - 6)  metoclopramide Injectable 10 milliGRAM(s) IV Push every 8 hours PRN Vomiting  oxyCODONE    IR 5 milliGRAM(s) Oral every 6 hours PRN Severe Pain (7 - 10)      Physical exam  Gen: NAD  Neuro: AO  Card: S1 S2  Pulm: Course breath sounds - tracheostomy site is c/d/i  Abd: Soft - PEG site c/d/i  Ext: moderate edema    I&O's Summary    28 May 2020 07:01  -  29 May 2020 07:00  --------------------------------------------------------  IN: 2200 mL / OUT: 4300 mL / NET: -2100 mL        Assessment  65y Male  w/ PAST MEDICAL & SURGICAL HISTORY:  Chronic CHF  COPD without exacerbation  Atrial fibrillation  Presence of Watchman left atrial appendage closure device  Hypertension  GERD (gastroesophageal reflux disease)  Chronic obstructive pulmonary disease (COPD)  Anemia  Falls  Meningitis  Collapsed lung  Alcohol withdrawal  Emphysema of lung  Cirrhosis  CHF (congestive heart failure)  Poor historian  Alcohol abuse  Chronic atrial fibrillation  Unilateral amputation of lower extremity below knee  Presence of Watchman left atrial appendage closure device  S/P BKA (below knee amputation) unilateral, left    65M h/o diastolic CHF, ETOH, COPD admitted with acute respiratory failure due to COPD exacerbation complicated by pneumonia.  Underwent trach/PEG on 5/15/20      PLAN    TF to goal.  CPAP trials.  PT/OT.  Continue antibiotics.    Discussed with Cardiothoracic Team at AM rounds.

## 2020-05-29 NOTE — PROGRESS NOTE ADULT - ASSESSMENT
66 y/o male with h/o chronic A.Fib with Watchman device, CHF, COPD, HTN, obesity was admitted on 4/18 for worsening SOB. In ER he was found with rapid A.fib and was placed on NRB. He tested COVID-19 PCR negative x 3. Noted with hypercapnea and placed on BiPAP, then intubated and placed on ventilatory support. He was extubated on 4/26. He is reported with MDRO's in sputum c/s. He was treated with azithromycin from 4/20 to 4/25.     1. Low grade fever. Persistent sepsis with MSSA. Right arm cellulitis/ phlebitis. Acute respiratory failure. 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 Allergy to PCN and cephalosporines. Possible COVID-19 syndrome.   -still with low grade fever  -MYA no evidence of valvular vegetations or vegetation on Watchman device; doubt endocarditis  -encephalopathy   -cultures reviewed; repeat sputum c/s shows CRE-PSAE  -s/p cipro IV # two cycles of therapy  -s/p vancomycin 1 gm IV q12h # 10 days until 5/7  -respiratory care  -repeat BC x 2 are negative to date  -s/p vancomycin 1 gm IV q12h # 4 days  -on cefazolin 2 gm iV q8h # 7 # 8  -tolerating abx well so far; no side effects noted  -he had prior CRE-PSAE; new sputum culture shows PSAE again; CXR dose not show infiltrates  -f/u closely for rashes  -repeat BC collected again this AM  -monitor closely in [facility] of duricef allergy history  -continue abx coverage with cefazolin  -f/u cultures  -monitor temps  -f/u CBC  -supportive care  2. Other issues:   -care per medicine

## 2020-05-29 NOTE — PROGRESS NOTE ADULT - ASSESSMENT
66 y/o M admitted on 4/18 with COPD exacerbation -- RRT called on 4/23 for AMS requiring intubation  extubated on 4/26 then re intubated on 4/29.    S/P Trach and PEG on 5/15 and has failed SBT.    Most recent active issues is persistent MSSA sepsis and CRP in sputum--on IV vanco and Cipro.  No central access      pt continues to suffer from:  chronic resp failure requiring ventilator support  TM Encephalopathy   concern for endocarditis as cause of bacteremia with MSSA - NO evidence of vegetations on MYA 5/26.  COVID Negative on 5/24  Blood cultures 5/23: still NGTD  atrial fibrillation RVR - better controlled at present  urinary retention  S/p trach and PEG  MYA 5/27 - NO evidence of cardiac vegetations.    PMHx - chronic AFib w Watchman device, CHF, COPD, HTN, obesity     continues to be at risk for deterioration, morbidity and mortality given current circumstances.    Plan at this time is for continued care in ICU  VAP prevention bundle  SBT as tolerated  IV vanco, IV Cipro- currently on Ancef (6) - likely continue 14 days from 5/23 (negative) blood cultures  Xanax 0.5g q 12  Cont ASA for CAD and AFib-- Dilt 90mg q6   Plavix    stopped (5/26) Apixiban - No need with Watchman device  chemical VTE prophylaxis Lovenox 40mg SC q12h based on BMI  TF  HOB > 30  Cardura for urinary retention  Supportive care  ICU care--d/w ICU staff on multi disciplinary rounds  Discussed with social work - start discharge planning.      Attending Attestation:   40 minutes spent on total encounter; more than 50% of the visit was spent counseling and/or coordinating care by the attending physician. 66 y/o M admitted on 4/18 with COPD exacerbation -- RRT called on 4/23 for AMS requiring intubation  extubated on 4/26 then re intubated on 4/29.    S/P Trach and PEG on 5/15 and has failed SBT.    Most recent active issues is persistent MSSA sepsis and CRP in sputum--on IV vanco and Cipro.  No central access      pt continues to suffer from:  chronic resp failure requiring ventilator support  TM Encephalopathy   concern for endocarditis as cause of bacteremia with MSSA - NO evidence of vegetations on MYA 5/26.  COVID Negative on 5/24  Blood cultures 5/23: still NGTD  atrial fibrillation RVR - better controlled at present  urinary retention  S/p trach and PEG  MYA 5/27 - NO evidence of cardiac vegetations.    PMHx - chronic AFib w Watchman device, CHF, COPD, HTN, obesity     continues to be at risk for deterioration, morbidity and mortality given current circumstances.    Plan at this time is for continued care in ICU  VAP prevention bundle  SBT as tolerated  IV vanco, IV Cipro- currently on Ancef (6) - likely continue 14 days from 5/23 (negative) blood cultures  Xanax 0.5g q 12  Cont ASA for CAD and AFib-- Dilt 90mg q6   Plavix    stopped (5/26) Apixiban - No need with Watchman device  chemical VTE prophylaxis Lovenox 40mg SC q12h based on BMI  TF  HOB > 30  Cardura for urinary retention  Swab for COVID-19 on 5/30 in prep for [facility] discharge 6/1  Supportive care  ICU care--d/w ICU staff on multi disciplinary rounds  Discussed with social work - start discharge planning.      Attending Attestation:   30 minutes spent on total encounter; more than 50% of the visit was spent counseling and/or coordinating care by the attending physician.

## 2020-05-29 NOTE — PROGRESS NOTE ADULT - SUBJECTIVE AND OBJECTIVE BOX
[facility]  ICU # 35  Vent # 27  Trach and PEG # 13    COVID-19 NEGATIVE x2    64 y/o M admitted on 4/18 with COPD exacerbation -- RRT called on 4/23 for AMS requiring intubation  extubated on 4/26 then re intubated on 4/29.    S/P Trach and PEG on 5/15 and has failed SBT.    Most recent active issues is persistent MSSA sepsis and CRP in sputum--on IV vanco and Cipro.  No central access      5/22:  remains intubated, encephalopathy.  (+) MSSA + in Bcx  5/23:  Tolerating limited PSV - persistent poor mentation.  Repeat BCx sent.  Abx changed to Ancef  5/24:  Rapid AFib O/N.  Still Encephalopathy.  COVID sent.  5/23 BCx still P.        above d/w Dr Dorado who has been caring for the patient.    5/25: cultures from 5/23 remain NGTD - COVID swab again negative on 5/24.    Hemodynamics reasonable.    Remains intubated, NOT sedated, mentation remains poor.  TF in progress.  On Abx.    5/26: for MYA this morning.  Persistent AMS - hemodynamics reasonable.  Remains vent dependent.  Addendum: NO evidence of cardiac vegetations noted by cardiologist during MYA.    5/27: hemodynamics reasonable.  remains vent dependent - only tolerating limited periods of PSV.  d/w Dr Lane this morning.    CXR personally reviewed.    5/29: no new issues, no overnight events - hemodynamics reasonable - tolerating limited PSV.      PMHx - chronic AFib w Watchman device, CHF, COPD, HTN, obesity   Allergies    Ceclor (Rash)  Morenaf (Rash)      ICU Vital Signs Last 24 Hrs  T(C): 37.3 (29 May 2020 09:00), Max: 38.3 (29 May 2020 05:00)  T(F): 99.2 (29 May 2020 09:00), Max: 100.9 (29 May 2020 05:00)  HR: 108 (29 May 2020 11:00) (79 - 127)  BP: 104/58 (29 May 2020 11:00) (104/58 - 145/76)  BP(mean): 67 (29 May 2020 11:00) (62 - 117)  RR: 28 (29 May 2020 11:00) (0 - 31)  SpO2: 100% (29 May 2020 11:00) (88% - 100%)      Mode: AC/ CMV (Assist Control/ Continuous Mandatory Ventilation)  RR (machine): 15  TV (machine): 450  FiO2: 40  PEEP: 5  ITime: 1  MAP: 13  PIP: 34      I&O's Summary    28 May 2020 07:01  -  29 May 2020 07:00  --------------------------------------------------------  IN: 2200 mL / OUT: 4300 mL / NET: -2100 mL                              9.0    7.37  )-----------( 249      ( 29 May 2020 06:45 )             27.8       05-29    136  |  102  |  9   ----------------------------<  106<H>  3.9   |  32<H>  |  0.49<L>    Ca    8.9      29 May 2020 06:45  Phos  3.8     05-29  Mg     1.7     05-29      MEDICATIONS  (STANDING):  ALBUTerol    90 MICROgram(s) HFA Inhaler 2 Puff(s) Inhalation every 4 hours  ALPRAZolam 0.5 milliGRAM(s) Oral every 12 hours  aspirin  chewable 81 milliGRAM(s) Oral daily  budesonide 160 MICROgram(s)/formoterol 4.5 MICROgram(s) Inhaler 2 Puff(s) Inhalation two times a day  ceFAZolin   IVPB 2000 milliGRAM(s) IV Intermittent every 8 hours  chlorhexidine 0.12% Liquid 15 milliLiter(s) Oral Mucosa every 12 hours  clopidogrel Tablet 75 milliGRAM(s) Oral daily  diltiazem    Tablet 90 milliGRAM(s) Oral every 6 hours  doxazosin 2 milliGRAM(s) Oral at bedtime  enoxaparin Injectable 40 milliGRAM(s) SubCutaneous every 12 hours  gabapentin 400 milliGRAM(s) Oral three times a day  pantoprazole  Injectable 40 milliGRAM(s) IV Push daily  QUEtiapine 50 milliGRAM(s) Oral every 12 hours  thiamine 100 milliGRAM(s) Oral daily  tiotropium 18 MICROgram(s) Capsule 1 Capsule(s) Inhalation daily    MEDICATIONS  (PRN):  acetaminophen   Tablet .. 650 milliGRAM(s) Oral every 6 hours PRN Temp greater or equal to 38.5C (101.3F)  ibuprofen  Tablet. 400 milliGRAM(s) Oral every 6 hours PRN Moderate Pain (4 - 6)  metoclopramide Injectable 10 milliGRAM(s) IV Push every 8 hours PRN Vomiting  oxyCODONE    IR 5 milliGRAM(s) Oral every 6 hours PRN Severe Pain (7 - 10)            Review of Systems:   · Additional ROS	Unobtainable due to clinical condition	    Physical Exam:   · Constitutional	detailed exam	  · Constitutional Details	ill; restless but poorly cooperative	  · ENMT	detailed exam	  · ENMT Comments	Trach in situ connected to Vent	  · Respiratory	detailed exam	  · Respiratory Details	breath sounds equal B/L; NO sig W/R/R	  · Cardiovascular	detailed exam	  · Cardiovascular Details	irregular rate and rhythm	  · Gastrointestinal	detailed exam	  · GI Normal	soft; NT/ND (+) BS	  · GI Abnormal	+ PEG in situ; Site is CDI	  · Extremities	detailed exam	  · Extremities Details	no cyanosis; LLE BKA	  · Neurological	detailed exam	  · Neurological Details	Encephalopathy persists; minimally interactive; MARIE spont NO gross motor or sensory deficits	  · Skin	detailed exam	  · Skin Details	warm and dry	        DVT Prophylaxis: Lovenox; [facility]ody[facility]  ICU # 35  Vent # 27  Trach and PEG # 13    COVID-19 NEGATIVE x2    66 y/o M admitted on 4/18 with COPD exacerbation -- RRT called on 4/23 for AMS requiring intubation  extubated on 4/26 then re intubated on 4/29.    S/P Trach and PEG on 5/15 and has failed SBT.    Most recent active issues is persistent MSSA sepsis and CRP in sputum--on IV vanco and Cipro.  No central access      5/22:  remains intubated, encephalopathy.  (+) MSSA + in Bcx  5/23:  Tolerating limited PSV - persistent poor mentation.  Repeat BCx sent.  Abx changed to Ancef  5/24:  Rapid AFib O/N.  Still Encephalopathy.  COVID sent.  5/23 BCx still P.        above d/w Dr Dorado who has been caring for the patient.    5/25: cultures from 5/23 remain NGTD - COVID swab again negative on 5/24.    Hemodynamics reasonable.    Remains intubated, NOT sedated, mentation remains poor.  TF in progress.  On Abx.    5/26: for MYA this morning.  Persistent AMS - hemodynamics reasonable.  Remains vent dependent.  Addendum: NO evidence of cardiac vegetations noted by cardiologist during MYA.    5/27: hemodynamics reasonable.  remains vent dependent - only tolerating limited periods of PSV.  d/w Dr Lane this morning.    CXR personally reviewed.    5/29: no new issues, no overnight events - hemodynamics reasonable - tolerating limited PSV.    Issues/plans/prognosis and questions addressed with tracee Mcghee via telephone yesterday.    PMHx - chronic AFib w Watchman device, CHF, COPD, HTN, obesity   Allergies    Ceclor (Rash)  Duricef (Rash)      ICU Vital Signs Last 24 Hrs  T(C): 37.3 (29 May 2020 09:00), Max: 38.3 (29 May 2020 05:00)  T(F): 99.2 (29 May 2020 09:00), Max: 100.9 (29 May 2020 05:00)  HR: 108 (29 May 2020 11:00) (79 - 127)  BP: 104/58 (29 May 2020 11:00) (104/58 - 145/76)  BP(mean): 67 (29 May 2020 11:00) (62 - 117)  RR: 28 (29 May 2020 11:00) (0 - 31)  SpO2: 100% (29 May 2020 11:00) (88% - 100%)      Mode: AC/ CMV (Assist Control/ Continuous Mandatory Ventilation)  RR (machine): 15  TV (machine): 450  FiO2: 40  PEEP: 5  ITime: 1  MAP: 13  PIP: 34      I&O's Summary    28 May 2020 07:01  -  29 May 2020 07:00  --------------------------------------------------------  IN: 2200 mL / OUT: 4300 mL / NET: -2100 mL                              9.0    7.37  )-----------( 249      ( 29 May 2020 06:45 )             27.8       05-29    136  |  102  |  9   ----------------------------<  106<H>  3.9   |  32<H>  |  0.49<L>    Ca    8.9      29 May 2020 06:45  Phos  3.8     05-29  Mg     1.7     05-29      MEDICATIONS  (STANDING):  ALBUTerol    90 MICROgram(s) HFA Inhaler 2 Puff(s) Inhalation every 4 hours  ALPRAZolam 0.5 milliGRAM(s) Oral every 12 hours  aspirin  chewable 81 milliGRAM(s) Oral daily  budesonide 160 MICROgram(s)/formoterol 4.5 MICROgram(s) Inhaler 2 Puff(s) Inhalation two times a day  ceFAZolin   IVPB 2000 milliGRAM(s) IV Intermittent every 8 hours  chlorhexidine 0.12% Liquid 15 milliLiter(s) Oral Mucosa every 12 hours  clopidogrel Tablet 75 milliGRAM(s) Oral daily  diltiazem    Tablet 90 milliGRAM(s) Oral every 6 hours  doxazosin 2 milliGRAM(s) Oral at bedtime  enoxaparin Injectable 40 milliGRAM(s) SubCutaneous every 12 hours  gabapentin 400 milliGRAM(s) Oral three times a day  pantoprazole  Injectable 40 milliGRAM(s) IV Push daily  QUEtiapine 50 milliGRAM(s) Oral every 12 hours  thiamine 100 milliGRAM(s) Oral daily  tiotropium 18 MICROgram(s) Capsule 1 Capsule(s) Inhalation daily    MEDICATIONS  (PRN):  acetaminophen   Tablet .. 650 milliGRAM(s) Oral every 6 hours PRN Temp greater or equal to 38.5C (101.3F)  ibuprofen  Tablet. 400 milliGRAM(s) Oral every 6 hours PRN Moderate Pain (4 - 6)  metoclopramide Injectable 10 milliGRAM(s) IV Push every 8 hours PRN Vomiting  oxyCODONE    IR 5 milliGRAM(s) Oral every 6 hours PRN Severe Pain (7 - 10)            Review of Systems:   · Additional ROS	Unobtainable due to clinical condition	    Physical Exam:   · Constitutional	detailed exam	  · Constitutional Details	ill; restless but poorly cooperative	  · ENMT	detailed exam	  · ENMT Comments	Trach in situ connected to Vent	  · Respiratory	detailed exam	  · Respiratory Details	breath sounds equal B/L; NO sig W/R/R	  · Cardiovascular	detailed exam	  · Cardiovascular Details	irregular rate and rhythm	  · Gastrointestinal	detailed exam	  · GI Normal	soft; NT/ND (+) BS	  · GI Abnormal	+ PEG in situ; Site is CDI	  · Extremities	detailed exam	  · Extremities Details	no cyanosis; LLE BKA	  · Neurological	detailed exam	  · Neurological Details	Encephalopathy persists; minimally interactive; MARIE spont NO gross motor or sensory deficits	  · Skin	detailed exam	  · Skin Details	warm and dry	        DVT Prophylaxis: Lovenox; venodynes

## 2020-05-29 NOTE — PROGRESS NOTE ADULT - SUBJECTIVE AND OBJECTIVE BOX
Subjective:    pat on vent tolerated cpap yesterday for 5-6 hrs, awake.    Home Medications:  albuterol 2.5 mg/3 mL (0.083%) inhalation solution: 3 milliliter(s) inhaled every 6 hours, As Needed -for shortness of breath and/or wheezing (19 Apr 2020 03:12)  gabapentin 400 mg oral capsule: 1 cap(s) orally 3 times a day (19 Apr 2020 03:12)  pantoprazole 40 mg oral granule, enteric coated: 40 milligram(s) orally once a day (19 Apr 2020 03:12)  Spiriva 18 mcg inhalation capsule: 1 cap(s) inhaled once a day (19 Apr 2020 03:12)  tamsulosin 0.4 mg oral capsule: 1 cap(s) orally once a day (at bedtime) (19 Apr 2020 03:12)  traZODone 50 mg oral tablet: 2 tab(s) orally once a day (at bedtime), As Needed (19 Apr 2020 03:12)    MEDICATIONS  (STANDING):  ALBUTerol    90 MICROgram(s) HFA Inhaler 2 Puff(s) Inhalation every 4 hours  ALPRAZolam 0.5 milliGRAM(s) Oral every 12 hours  aspirin  chewable 81 milliGRAM(s) Oral daily  budesonide 160 MICROgram(s)/formoterol 4.5 MICROgram(s) Inhaler 2 Puff(s) Inhalation two times a day  ceFAZolin   IVPB 2000 milliGRAM(s) IV Intermittent every 8 hours  chlorhexidine 0.12% Liquid 15 milliLiter(s) Oral Mucosa every 12 hours  clopidogrel Tablet 75 milliGRAM(s) Oral daily  diltiazem    Tablet 90 milliGRAM(s) Oral every 6 hours  doxazosin 2 milliGRAM(s) Oral at bedtime  enoxaparin Injectable 40 milliGRAM(s) SubCutaneous every 12 hours  gabapentin 400 milliGRAM(s) Oral three times a day  pantoprazole  Injectable 40 milliGRAM(s) IV Push daily  QUEtiapine 50 milliGRAM(s) Oral every 12 hours  thiamine 100 milliGRAM(s) Oral daily  tiotropium 18 MICROgram(s) Capsule 1 Capsule(s) Inhalation daily    MEDICATIONS  (PRN):  acetaminophen   Tablet .. 650 milliGRAM(s) Oral every 6 hours PRN Temp greater or equal to 38.5C (101.3F)  ibuprofen  Tablet. 400 milliGRAM(s) Oral every 6 hours PRN Moderate Pain (4 - 6)  metoclopramide Injectable 10 milliGRAM(s) IV Push every 8 hours PRN Vomiting  oxyCODONE    IR 5 milliGRAM(s) Oral every 6 hours PRN Severe Pain (7 - 10)      Allergies    Ceclor (Rash)  Duricef (Rash)    Intolerances        Vital Signs Last 24 Hrs  T(C): 37.3 (29 May 2020 09:00), Max: 38.3 (29 May 2020 05:00)  T(F): 99.2 (29 May 2020 09:00), Max: 100.9 (29 May 2020 05:00)  HR: 108 (29 May 2020 11:00) (79 - 127)  BP: 104/58 (29 May 2020 11:00) (104/58 - 145/76)  BP(mean): 67 (29 May 2020 11:00) (62 - 117)  RR: 28 (29 May 2020 11:00) (0 - 31)  SpO2: 100% (29 May 2020 11:00) (88% - 100%)  Mode: AC/ CMV (Assist Control/ Continuous Mandatory Ventilation)  RR (machine): 15  TV (machine): 450  FiO2: 40  PEEP: 5  ITime: 1  MAP: 13  PIP: 34      PHYSICAL EXAMINATION:    NECK:  Supple. No lymphadenopathy. Jugular venous pressure not elevated. Carotids equal.   HEART:   The cardiac impulse has a normal quality. Reg., Nl S1 and S2.  There are no murmurs, rubs or gallops noted  CHEST:  Chest crackles to auscultation. Normal respiratory effort.  ABDOMEN:  Soft and nontender.   EXTREMITIES:  There is no edema.       LABS:                        9.0    7.37  )-----------( 249      ( 29 May 2020 06:45 )             27.8     05-29    136  |  102  |  9   ----------------------------<  106<H>  3.9   |  32<H>  |  0.49<L>    Ca    8.9      29 May 2020 06:45  Phos  3.8     05-29  Mg     1.7     05-29

## 2020-05-29 NOTE — PROGRESS NOTE ADULT - ASSESSMENT
PROBLEMS;    s/p staph bactermia/sputum pseudomonas/staph  Ac on chronic hypercapnic & hypoxamic respiratory failure-s/p trach & PEG  sputum-Few Pseudomonas aeruginosa (Carbapenem Resistant)/Moderate Methicillin resistant Staphylococcus aureus  afib with RVR  [facility]/VIJAY  AC flare of COPD/chronic vs acute on chronic bronchitis  Mild interstitial lung disease-NSIP h/o smoking  COVID negativex2  Pulmonary hypertension  Nonobstructing stone in the left ureterovesicular junction/ nonobstructing stone in the lower pole right kidney.  Subacute hemorrhage in the right rectus abdominis along the anterior sheath, measures 1.6 cm in thickness and extends from the umbilicus to the inferior myotendinous junction  Cirrhosis  Mild splenomegaly-portal venous hypertension.  Chronic afib w Watchman device  CHF  BPH  GERD  ETOH abuse  seizures disorder    PLAN;    pulmonary slow recovery- possible placement for rehab  vent support-pat multiple failed weaning attempts- s/p trach weaning as tolerated  iv ancef for bactermia  off iv cipro for pseudomonas  Tube feeding as tolerated  supportive care  covid repeat testing-neg  Eliquis/asa 81  d/w staff  d/w staff

## 2020-05-30 LAB
ANION GAP SERPL CALC-SCNC: 3 MMOL/L — LOW (ref 5–17)
BUN SERPL-MCNC: 10 MG/DL — SIGNIFICANT CHANGE UP (ref 7–23)
CALCIUM SERPL-MCNC: 8.5 MG/DL — SIGNIFICANT CHANGE UP (ref 8.5–10.1)
CHLORIDE SERPL-SCNC: 105 MMOL/L — SIGNIFICANT CHANGE UP (ref 96–108)
CO2 SERPL-SCNC: 31 MMOL/L — SIGNIFICANT CHANGE UP (ref 22–31)
CREAT SERPL-MCNC: 0.49 MG/DL — LOW (ref 0.5–1.3)
GLUCOSE SERPL-MCNC: 94 MG/DL — SIGNIFICANT CHANGE UP (ref 70–99)
HCT VFR BLD CALC: 26 % — LOW (ref 39–50)
HGB BLD-MCNC: 8.1 G/DL — LOW (ref 13–17)
MAGNESIUM SERPL-MCNC: 1.6 MG/DL — SIGNIFICANT CHANGE UP (ref 1.6–2.6)
MCHC RBC-ENTMCNC: 30.6 PG — SIGNIFICANT CHANGE UP (ref 27–34)
MCHC RBC-ENTMCNC: 31.2 GM/DL — LOW (ref 32–36)
MCV RBC AUTO: 98.1 FL — SIGNIFICANT CHANGE UP (ref 80–100)
PHOSPHATE SERPL-MCNC: 3.8 MG/DL — SIGNIFICANT CHANGE UP (ref 2.5–4.5)
PLATELET # BLD AUTO: 227 K/UL — SIGNIFICANT CHANGE UP (ref 150–400)
POTASSIUM SERPL-MCNC: 3.7 MMOL/L — SIGNIFICANT CHANGE UP (ref 3.5–5.3)
POTASSIUM SERPL-SCNC: 3.7 MMOL/L — SIGNIFICANT CHANGE UP (ref 3.5–5.3)
RBC # BLD: 2.65 M/UL — LOW (ref 4.2–5.8)
RBC # FLD: 13.4 % — SIGNIFICANT CHANGE UP (ref 10.3–14.5)
SARS-COV-2 RNA SPEC QL NAA+PROBE: SIGNIFICANT CHANGE UP
SODIUM SERPL-SCNC: 139 MMOL/L — SIGNIFICANT CHANGE UP (ref 135–145)
WBC # BLD: 5.2 K/UL — SIGNIFICANT CHANGE UP (ref 3.8–10.5)
WBC # FLD AUTO: 5.2 K/UL — SIGNIFICANT CHANGE UP (ref 3.8–10.5)

## 2020-05-30 PROCEDURE — 99232 SBSQ HOSP IP/OBS MODERATE 35: CPT

## 2020-05-30 PROCEDURE — 71045 X-RAY EXAM CHEST 1 VIEW: CPT | Mod: 26

## 2020-05-30 RX ORDER — ALPRAZOLAM 0.25 MG
0.5 TABLET ORAL EVERY 8 HOURS
Refills: 0 | Status: DISCONTINUED | OUTPATIENT
Start: 2020-05-30 | End: 2020-06-06

## 2020-05-30 RX ADMIN — Medication 100 MILLIGRAM(S): at 12:14

## 2020-05-30 RX ADMIN — OXYCODONE HYDROCHLORIDE 5 MILLIGRAM(S): 5 TABLET ORAL at 22:26

## 2020-05-30 RX ADMIN — Medication 100 MILLIGRAM(S): at 13:00

## 2020-05-30 RX ADMIN — QUETIAPINE FUMARATE 50 MILLIGRAM(S): 200 TABLET, FILM COATED ORAL at 05:41

## 2020-05-30 RX ADMIN — GABAPENTIN 400 MILLIGRAM(S): 400 CAPSULE ORAL at 13:00

## 2020-05-30 RX ADMIN — CHLORHEXIDINE GLUCONATE 15 MILLILITER(S): 213 SOLUTION TOPICAL at 17:01

## 2020-05-30 RX ADMIN — Medication 100 MILLIGRAM(S): at 22:26

## 2020-05-30 RX ADMIN — CLOPIDOGREL BISULFATE 75 MILLIGRAM(S): 75 TABLET, FILM COATED ORAL at 12:14

## 2020-05-30 RX ADMIN — Medication 90 MILLIGRAM(S): at 17:00

## 2020-05-30 RX ADMIN — GABAPENTIN 400 MILLIGRAM(S): 400 CAPSULE ORAL at 22:26

## 2020-05-30 RX ADMIN — PANTOPRAZOLE SODIUM 40 MILLIGRAM(S): 20 TABLET, DELAYED RELEASE ORAL at 12:14

## 2020-05-30 RX ADMIN — OXYCODONE HYDROCHLORIDE 5 MILLIGRAM(S): 5 TABLET ORAL at 05:30

## 2020-05-30 RX ADMIN — Medication 0.5 MILLIGRAM(S): at 22:26

## 2020-05-30 RX ADMIN — Medication 100 MILLIGRAM(S): at 05:41

## 2020-05-30 RX ADMIN — ENOXAPARIN SODIUM 40 MILLIGRAM(S): 100 INJECTION SUBCUTANEOUS at 05:41

## 2020-05-30 RX ADMIN — Medication 2 MILLIGRAM(S): at 22:26

## 2020-05-30 RX ADMIN — Medication 0.5 MILLIGRAM(S): at 05:41

## 2020-05-30 RX ADMIN — GABAPENTIN 400 MILLIGRAM(S): 400 CAPSULE ORAL at 05:41

## 2020-05-30 RX ADMIN — ENOXAPARIN SODIUM 40 MILLIGRAM(S): 100 INJECTION SUBCUTANEOUS at 17:00

## 2020-05-30 RX ADMIN — Medication 90 MILLIGRAM(S): at 05:40

## 2020-05-30 RX ADMIN — CHLORHEXIDINE GLUCONATE 15 MILLILITER(S): 213 SOLUTION TOPICAL at 05:41

## 2020-05-30 RX ADMIN — Medication 90 MILLIGRAM(S): at 12:14

## 2020-05-30 RX ADMIN — Medication 90 MILLIGRAM(S): at 23:58

## 2020-05-30 RX ADMIN — Medication 81 MILLIGRAM(S): at 12:14

## 2020-05-30 RX ADMIN — Medication 0.5 MILLIGRAM(S): at 13:00

## 2020-05-30 RX ADMIN — QUETIAPINE FUMARATE 50 MILLIGRAM(S): 200 TABLET, FILM COATED ORAL at 17:00

## 2020-05-30 RX ADMIN — OXYCODONE HYDROCHLORIDE 5 MILLIGRAM(S): 5 TABLET ORAL at 12:00

## 2020-05-30 NOTE — PROGRESS NOTE ADULT - SUBJECTIVE AND OBJECTIVE BOX
Subjective:    pat on vent, condition unchanged.    Home Medications:  albuterol 2.5 mg/3 mL (0.083%) inhalation solution: 3 milliliter(s) inhaled every 6 hours, As Needed -for shortness of breath and/or wheezing (19 Apr 2020 03:12)  gabapentin 400 mg oral capsule: 1 cap(s) orally 3 times a day (19 Apr 2020 03:12)  pantoprazole 40 mg oral granule, enteric coated: 40 milligram(s) orally once a day (19 Apr 2020 03:12)  Spiriva 18 mcg inhalation capsule: 1 cap(s) inhaled once a day (19 Apr 2020 03:12)  tamsulosin 0.4 mg oral capsule: 1 cap(s) orally once a day (at bedtime) (19 Apr 2020 03:12)  traZODone 50 mg oral tablet: 2 tab(s) orally once a day (at bedtime), As Needed (19 Apr 2020 03:12)    MEDICATIONS  (STANDING):  ALBUTerol    90 MICROgram(s) HFA Inhaler 2 Puff(s) Inhalation every 4 hours  ALPRAZolam 0.5 milliGRAM(s) Oral every 12 hours  aspirin  chewable 81 milliGRAM(s) Oral daily  budesonide 160 MICROgram(s)/formoterol 4.5 MICROgram(s) Inhaler 2 Puff(s) Inhalation two times a day  ceFAZolin   IVPB 2000 milliGRAM(s) IV Intermittent every 8 hours  chlorhexidine 0.12% Liquid 15 milliLiter(s) Oral Mucosa every 12 hours  clopidogrel Tablet 75 milliGRAM(s) Oral daily  diltiazem    Tablet 90 milliGRAM(s) Oral every 6 hours  doxazosin 2 milliGRAM(s) Oral at bedtime  enoxaparin Injectable 40 milliGRAM(s) SubCutaneous every 12 hours  gabapentin 400 milliGRAM(s) Oral three times a day  pantoprazole  Injectable 40 milliGRAM(s) IV Push daily  QUEtiapine 50 milliGRAM(s) Oral every 12 hours  thiamine 100 milliGRAM(s) Oral daily  tiotropium 18 MICROgram(s) Capsule 1 Capsule(s) Inhalation daily    MEDICATIONS  (PRN):  acetaminophen   Tablet .. 650 milliGRAM(s) Oral every 6 hours PRN Temp greater or equal to 38.5C (101.3F)  ibuprofen  Tablet. 400 milliGRAM(s) Oral every 6 hours PRN Moderate Pain (4 - 6)  metoclopramide Injectable 10 milliGRAM(s) IV Push every 8 hours PRN Vomiting  oxyCODONE    IR 5 milliGRAM(s) Oral every 6 hours PRN Severe Pain (7 - 10)      Allergies    Ceclor (Rash)  Duricef (Rash)    Intolerances        Vital Signs Last 24 Hrs  T(C): 36.9 (30 May 2020 08:00), Max: 37.4 (29 May 2020 18:00)  T(F): 98.4 (30 May 2020 08:00), Max: 99.4 (29 May 2020 18:00)  HR: 75 (30 May 2020 10:00) (71 - 116)  BP: 115/66 (30 May 2020 10:00) (93/45 - 139/79)  BP(mean): 74 (30 May 2020 10:00) (52 - 105)  RR: 26 (30 May 2020 10:00) (13 - 33)  SpO2: 100% (30 May 2020 10:00) (91% - 100%)  Mode: PS (Pressure Support)/ Spontaneous  FiO2: 40  PEEP: 5  ITime: 0.07  MAP: 10  PC: 10  PIP: 29      PHYSICAL EXAMINATION:    NECK:  Supple. No lymphadenopathy. Jugular venous pressure not elevated. Carotids equal.   HEART:   The cardiac impulse has a normal quality. Reg., Nl S1 and S2.  There are no murmurs, rubs or gallops noted  CHEST:  Chest crackles to auscultation. Normal respiratory effort.  ABDOMEN:  Soft and nontender.   EXTREMITIES:  There is no edema.       LABS:                        8.1    5.20  )-----------( 227      ( 30 May 2020 07:23 )             26.0     05-30    139  |  105  |  10  ----------------------------<  94  3.7   |  31  |  0.49<L>    Ca    8.5      30 May 2020 07:23  Phos  3.8     05-30  Mg     1.6     05-30

## 2020-05-30 NOTE — PROGRESS NOTE ADULT - SUBJECTIVE AND OBJECTIVE BOX
Subjective: Patient tolerating approx. 4H of pressure support trials daily. Vent required overnight. DC planning in progress and COVID swab resent today. No acute events as per RN. Tolerating TF at goal.     Vital Signs:  Vital Signs Last 24 Hrs  T(C): 36.9 (05-30-20 @ 08:00), Max: 37.4 (05-29-20 @ 18:00)  T(F): 98.4 (05-30-20 @ 08:00), Max: 99.4 (05-29-20 @ 18:00)  HR: 75 (05-30-20 @ 10:00) (71 - 116)  BP: 115/66 (05-30-20 @ 10:00) (93/45 - 139/79)  RR: 26 (05-30-20 @ 10:00) (13 - 33)  SpO2: 100% (05-30-20 @ 10:00) (91% - 100%) on (O2)    I&O's Detail    29 May 2020 07:01  -  30 May 2020 07:00  --------------------------------------------------------  IN:    Enteral Tube Flush: 961 mL    IV PiggyBack: 100 mL    ns in tub fed  nuicaw24: 1469 mL    Oral Fluid: 50 mL  Total IN: 2580 mL    OUT:    Incontinent per Retracted Penis Pouch: 3150 mL  Total OUT: 3150 mL    Total NET: -570 mL      30 May 2020 07:01  -  30 May 2020 10:14  --------------------------------------------------------  IN:    NSModularFluidAdult: 3 mL  Total IN: 3 mL    OUT:  Total OUT: 0 mL    Total NET: 3 mL          General: NAD  Neurology: Awake  Neck: Neck supple, trachea midline, No JVD, trach in place without erythema, minimal to moderate secretions  Respiratory: B/L BS  CV: S1S2, no murmurs, rubs or gallops  Abdominal: Soft, NT, ND, PEG in place without erythema or discharge      Relevant labs, radiology and Medications reviewed                        8.1    5.20  )-----------( 227      ( 30 May 2020 07:23 )             26.0     05-30    139  |  105  |  10  ----------------------------<  94  3.7   |  31  |  0.49<L>    Ca    8.5      30 May 2020 07:23  Phos  3.8     05-30  Mg     1.6     05-30        MEDICATIONS  (STANDING):  ALBUTerol    90 MICROgram(s) HFA Inhaler 2 Puff(s) Inhalation every 4 hours  ALPRAZolam 0.5 milliGRAM(s) Oral every 12 hours  aspirin  chewable 81 milliGRAM(s) Oral daily  budesonide 160 MICROgram(s)/formoterol 4.5 MICROgram(s) Inhaler 2 Puff(s) Inhalation two times a day  ceFAZolin   IVPB 2000 milliGRAM(s) IV Intermittent every 8 hours  chlorhexidine 0.12% Liquid 15 milliLiter(s) Oral Mucosa every 12 hours  clopidogrel Tablet 75 milliGRAM(s) Oral daily  diltiazem    Tablet 90 milliGRAM(s) Oral every 6 hours  doxazosin 2 milliGRAM(s) Oral at bedtime  enoxaparin Injectable 40 milliGRAM(s) SubCutaneous every 12 hours  gabapentin 400 milliGRAM(s) Oral three times a day  pantoprazole  Injectable 40 milliGRAM(s) IV Push daily  QUEtiapine 50 milliGRAM(s) Oral every 12 hours  thiamine 100 milliGRAM(s) Oral daily  tiotropium 18 MICROgram(s) Capsule 1 Capsule(s) Inhalation daily    MEDICATIONS  (PRN):  acetaminophen   Tablet .. 650 milliGRAM(s) Oral every 6 hours PRN Temp greater or equal to 38.5C (101.3F)  ibuprofen  Tablet. 400 milliGRAM(s) Oral every 6 hours PRN Moderate Pain (4 - 6)  metoclopramide Injectable 10 milliGRAM(s) IV Push every 8 hours PRN Vomiting  oxyCODONE    IR 5 milliGRAM(s) Oral every 6 hours PRN Severe Pain (7 - 10)        Assessment  65y Male  w/ PAST MEDICAL & SURGICAL HISTORY:  Chronic CHF  COPD without exacerbation  Atrial fibrillation  Presence of Watchman left atrial appendage closure device  Hypertension  GERD (gastroesophageal reflux disease)  Chronic obstructive pulmonary disease (COPD)  Anemia  Falls  Meningitis  Collapsed lung  Alcohol withdrawal  Emphysema of lung  Cirrhosis  CHF (congestive heart failure)  Poor historian  Alcohol abuse  Chronic atrial fibrillation  Unilateral amputation of lower extremity below knee  Presence of Watchman left atrial appendage closure device  S/P BKA (below knee amputation) unilateral, left  admitted with complaints of Patient is a 65y old  Male who presents with a chief complaint of acute hypoxemic, hypercapneic resp failure  COPD exacerbation (30 May 2020 07:53)  patient underwent Bronchoscopy with creation of tracheostomy and insertion of percutaneous endoscopic gastrostomy (PEG) tube  Insertion, nasogastric tube

## 2020-05-30 NOTE — PROGRESS NOTE ADULT - ASSESSMENT
66 y/o M admitted on 4/18 with COPD exacerbation -- RRT called on 4/23 for AMS requiring intubation  extubated on 4/26 then re intubated on 4/29.    S/P Trach and PEG on 5/15 and has failed SBT.    Most recent active issues is persistent MSSA sepsis and CRP in sputum--on IV vanco and Cipro.  No central access      pt continues to suffer from:  chronic resp failure requiring ventilator support  TM Encephalopathy   concern for endocarditis as cause of bacteremia with MSSA - NO evidence of vegetations on MYA 5/26.  COVID Negative on 5/24  Blood cultures 5/23: still NGTD  atrial fibrillation RVR - better controlled at present  urinary retention  S/p trach and PEG  MYA 5/27 - NO evidence of cardiac vegetations.    PMHx - chronic AFib w Watchman device, CHF, COPD, HTN, obesity     continues to be at risk for deterioration, morbidity and mortality given current circumstances.    Plan at this time is for continued care in ICU  VAP prevention bundle  SBT as tolerated  IV vanco, IV Cipro- currently on Ancef (6) - likely continue 14 days from 5/23 (negative) blood cultures  Xanax 0.5g q 12  Cont ASA for CAD and AFib-- Dilt 90mg q6   Plavix    stopped (5/26) Apixiban - No need with Watchman device  chemical VTE prophylaxis Lovenox 40mg SC q12h based on BMI  TF  HOB > 30  Cardura for urinary retention  Swab for COVID-19 on 5/30 in prep for [facility] discharge 6/1  Supportive care  ICU care--d/w ICU staff on multi disciplinary rounds  Discussed with social work - start discharge planning.      Attending Attestation:   30 minutes spent on total encounter; more than 50% of the visit was spent counseling and/or coordinating care by the attending physician.

## 2020-05-30 NOTE — PROGRESS NOTE ADULT - SUBJECTIVE AND OBJECTIVE BOX
[facility]  ICU # 36  Vent # 28  Trach and PEG # 14    COVID-19 NEGATIVE x2    64 y/o M admitted on 4/18 with COPD exacerbation -- RRT called on 4/23 for AMS requiring intubation  extubated on 4/26 then re intubated on 4/29.    S/P Trach and PEG on 5/15 and has failed SBT.    Most recent active issues is persistent MSSA sepsis and CRP in sputum--on IV vanco and Cipro.  No central access      5/22:  remains intubated, encephalopathy.  (+) MSSA + in Bcx  5/23:  Tolerating limited PSV - persistent poor mentation.  Repeat BCx sent.  Abx changed to Ancef  5/24:  Rapid AFib O/N.  Still Encephalopathy.  COVID sent.  5/23 BCx still P.        above d/w Dr Dorado who has been caring for the patient.    5/25: cultures from 5/23 remain NGTD - COVID swab again negative on 5/24.    Hemodynamics reasonable.    Remains intubated, NOT sedated, mentation remains poor.  TF in progress.  On Abx.    5/26: for MYA this morning.  Persistent AMS - hemodynamics reasonable.  Remains vent dependent.  Addendum: NO evidence of cardiac vegetations noted by cardiologist during MYA.    5/27: hemodynamics reasonable.  remains vent dependent - only tolerating limited periods of PSV.  d/w Dr Lane this morning.    CXR personally reviewed.    5/29: no new issues, no overnight events - hemodynamics reasonable - tolerating limited PSV.    Issues/plans/prognosis and questions addressed with tracee Mcghee via telephone yesterday.    5/30: no new issues, no overnight events - hemodynamics reasonable - tolerating limited PSV.    Remains restless and confused at times.     PMHx - chronic AFib w Watchman device, CHF, COPD, HTN, obesity       Allergies    Ceclor (Rash)  Duricef (Rash)    ICU Vital Signs Last 24 Hrs  T(C): 37.1 (30 May 2020 05:00), Max: 37.4 (29 May 2020 18:00)  T(F): 98.8 (30 May 2020 05:00), Max: 99.4 (29 May 2020 18:00)  HR: 71 (30 May 2020 07:00) (71 - 116)  BP: 99/48 (30 May 2020 07:00) (93/45 - 139/79)  BP(mean): 62 (30 May 2020 07:00) (56 - 105)  RR: 20 (30 May 2020 07:00) (13 - 33)  SpO2: 100% (30 May 2020 07:00) (91% - 100%)      Mode: AC/ CMV (Assist Control/ Continuous Mandatory Ventilation)  RR (machine): 15  TV (machine): 450  FiO2: 10  PEEP: 5  PS: 10  ITime: 0.7  MAP: 10  PIP: 26      I&O's Summary    29 May 2020 07:01  -  30 May 2020 07:00  --------------------------------------------------------  IN: 2580 mL / OUT: 3150 mL / NET: -570 mL                              8.1    5.20  )-----------( 227      ( 30 May 2020 07:23 )             26.0       05-29    136  |  102  |  9   ----------------------------<  106<H>  3.9   |  32<H>  |  0.49<L>    Ca    8.9      29 May 2020 06:45  Phos  3.8     05-29  Mg     1.7     05-29      MEDICATIONS  (STANDING):  ALBUTerol    90 MICROgram(s) HFA Inhaler 2 Puff(s) Inhalation every 4 hours  ALPRAZolam 0.5 milliGRAM(s) Oral every 12 hours  aspirin  chewable 81 milliGRAM(s) Oral daily  budesonide 160 MICROgram(s)/formoterol 4.5 MICROgram(s) Inhaler 2 Puff(s) Inhalation two times a day  ceFAZolin   IVPB 2000 milliGRAM(s) IV Intermittent every 8 hours  chlorhexidine 0.12% Liquid 15 milliLiter(s) Oral Mucosa every 12 hours  clopidogrel Tablet 75 milliGRAM(s) Oral daily  diltiazem    Tablet 90 milliGRAM(s) Oral every 6 hours  doxazosin 2 milliGRAM(s) Oral at bedtime  enoxaparin Injectable 40 milliGRAM(s) SubCutaneous every 12 hours  gabapentin 400 milliGRAM(s) Oral three times a day  pantoprazole  Injectable 40 milliGRAM(s) IV Push daily  QUEtiapine 50 milliGRAM(s) Oral every 12 hours  thiamine 100 milliGRAM(s) Oral daily  tiotropium 18 MICROgram(s) Capsule 1 Capsule(s) Inhalation daily    MEDICATIONS  (PRN):  acetaminophen   Tablet .. 650 milliGRAM(s) Oral every 6 hours PRN Temp greater or equal to 38.5C (101.3F)  ibuprofen  Tablet. 400 milliGRAM(s) Oral every 6 hours PRN Moderate Pain (4 - 6)  metoclopramide Injectable 10 milliGRAM(s) IV Push every 8 hours PRN Vomiting  oxyCODONE    IR 5 milliGRAM(s) Oral every 6 hours PRN Severe Pain (7 - 10)          Review of Systems:   · Additional ROS	Unobtainable due to clinical condition	    Physical Exam:   · Constitutional	detailed exam	  · Constitutional Details	ill; restless but poorly cooperative	  · ENMT	detailed exam	  · ENMT Comments	Trach in situ connected to Vent	  · Respiratory	detailed exam	  · Respiratory Details	breath sounds equal B/L; NO sig W/R/R	  · Cardiovascular	detailed exam	  · Cardiovascular Details	irregular rate and rhythm	  · Gastrointestinal	detailed exam	  · GI Normal	soft; NT/ND (+) BS	  · GI Abnormal	+ PEG in situ; Site is CDI	  · Extremities	detailed exam	  · Extremities Details	no cyanosis; LLE BKA	  · Neurological	detailed exam	  · Neurological Details	Encephalopathy persists; minimally interactive; MARIE spont NO gross motor or sensory deficits	  · Skin	detailed exam	  · Skin Details	warm and dry	        DVT Prophylaxis: Lovenox; venodynes [facility]  ICU # 36  Vent # 28  Trach and PEG # 14    COVID-19 NEGATIVE x2    66 y/o M admitted on 4/18 with COPD exacerbation -- RRT called on 4/23 for AMS requiring intubation  extubated on 4/26 then re intubated on 4/29.    S/P Trach and PEG on 5/15 and has failed SBT.    Most recent active issues is persistent MSSA sepsis and CRP in sputum--on IV vanco and Cipro.  No central access      5/22:  remains intubated, encephalopathy.  (+) MSSA + in Bcx  5/23:  Tolerating limited PSV - persistent poor mentation.  Repeat BCx sent.  Abx changed to Ancef  5/24:  Rapid AFib O/N.  Still Encephalopathy.  COVID sent.  5/23 BCx still P.        above d/w Dr Dorado who has been caring for the patient.    5/25: cultures from 5/23 remain NGTD - COVID swab again negative on 5/24.    Hemodynamics reasonable.    Remains intubated, NOT sedated, mentation remains poor.  TF in progress.  On Abx.    5/26: for MYA this morning.  Persistent AMS - hemodynamics reasonable.  Remains vent dependent.  Addendum: NO evidence of cardiac vegetations noted by cardiologist during MYA.    5/27: hemodynamics reasonable.  remains vent dependent - only tolerating limited periods of PSV.  d/w Dr Lane this morning.    CXR personally reviewed.    5/29: no new issues, no overnight events - hemodynamics reasonable - tolerating limited PSV.    Issues/plans/prognosis and questions addressed with tracee Mcghee via telephone yesterday.    5/30: no new issues, no overnight events - hemodynamics reasonable - tolerating limited PSV.    Remains restless and confused at times.     PMHx - chronic AFib w Watchman device, CHF, COPD, HTN, obesity       Allergies    Ceclor (Rash)  Duricef (Rash)    ICU Vital Signs Last 24 Hrs  T(C): 37.1 (30 May 2020 05:00), Max: 37.4 (29 May 2020 18:00)  T(F): 98.8 (30 May 2020 05:00), Max: 99.4 (29 May 2020 18:00)  HR: 71 (30 May 2020 07:00) (71 - 116)  BP: 99/48 (30 May 2020 07:00) (93/45 - 139/79)  BP(mean): 62 (30 May 2020 07:00) (56 - 105)  RR: 20 (30 May 2020 07:00) (13 - 33)  SpO2: 100% (30 May 2020 07:00) (91% - 100%)      Mode: AC/ CMV (Assist Control/ Continuous Mandatory Ventilation)  RR (machine): 15  TV (machine): 450  FiO2: 10  PEEP: 5  PS: 10  ITime: 0.7  MAP: 10  PIP: 26      I&O's Summary    29 May 2020 07:01  -  30 May 2020 07:00  --------------------------------------------------------  IN: 2580 mL / OUT: 3150 mL / NET: -570 mL                          8.1    5.20  )-----------( 227      ( 30 May 2020 07:23 )             26.0     30 May 2020 07:23    139    |  105    |  10     ----------------------------<  94     3.7     |  31     |  0.49     Ca    8.5        30 May 2020 07:23  Phos  3.8       30 May 2020 07:23  Mg     1.6       30 May 2020 07:23        CAPILLARY BLOOD GLUCOSE                        MEDICATIONS  (STANDING):  ALBUTerol    90 MICROgram(s) HFA Inhaler 2 Puff(s) Inhalation every 4 hours  ALPRAZolam 0.5 milliGRAM(s) Oral every 12 hours  aspirin  chewable 81 milliGRAM(s) Oral daily  budesonide 160 MICROgram(s)/formoterol 4.5 MICROgram(s) Inhaler 2 Puff(s) Inhalation two times a day  ceFAZolin   IVPB 2000 milliGRAM(s) IV Intermittent every 8 hours  chlorhexidine 0.12% Liquid 15 milliLiter(s) Oral Mucosa every 12 hours  clopidogrel Tablet 75 milliGRAM(s) Oral daily  diltiazem    Tablet 90 milliGRAM(s) Oral every 6 hours  doxazosin 2 milliGRAM(s) Oral at bedtime  enoxaparin Injectable 40 milliGRAM(s) SubCutaneous every 12 hours  gabapentin 400 milliGRAM(s) Oral three times a day  pantoprazole  Injectable 40 milliGRAM(s) IV Push daily  QUEtiapine 50 milliGRAM(s) Oral every 12 hours  thiamine 100 milliGRAM(s) Oral daily  tiotropium 18 MICROgram(s) Capsule 1 Capsule(s) Inhalation daily    MEDICATIONS  (PRN):  acetaminophen   Tablet .. 650 milliGRAM(s) Oral every 6 hours PRN Temp greater or equal to 38.5C (101.3F)  ibuprofen  Tablet. 400 milliGRAM(s) Oral every 6 hours PRN Moderate Pain (4 - 6)  metoclopramide Injectable 10 milliGRAM(s) IV Push every 8 hours PRN Vomiting  oxyCODONE    IR 5 milliGRAM(s) Oral every 6 hours PRN Severe Pain (7 - 10)          Review of Systems:   · Additional ROS	Unobtainable due to clinical condition	    Physical Exam:   · Constitutional	detailed exam	  · Constitutional Details	ill; restless but poorly cooperative	  · ENMT	detailed exam	  · ENMT Comments	Trach in situ connected to Vent	  · Respiratory	detailed exam	  · Respiratory Details	breath sounds equal B/L; NO sig W/R/R	  · Cardiovascular	detailed exam	  · Cardiovascular Details	irregular rate and rhythm	  · Gastrointestinal	detailed exam	  · GI Normal	soft; NT/ND (+) BS	  · GI Abnormal	+ PEG in situ; Site is CDI	  · Extremities	detailed exam	  · Extremities Details	no cyanosis; LLE BKA	  · Neurological	detailed exam	  · Neurological Details	Encephalopathy persists; minimally interactive; MARIE spont NO gross motor or sensory deficits	  · Skin	detailed exam	  · Skin Details	warm and dry	        DVT Prophylaxis: Lovenox; venodynes

## 2020-05-30 NOTE — PROGRESS NOTE ADULT - ASSESSMENT
65M h/o diastolic CHF, ETOH, COPD admitted with acute respiratory failure due to COPD exacerbation complicated by pneumonia.  Underwent trach/PEG on 5/15/20      PLAN    C/W TF at goal   CPAP trials as tolerated  PT/OT  FU COVID swab, awaiting rehab placement likely discharge next week   Continue antibiotics.    D/W Dr. Arturo Veloz PA  Thoracic Sx  #[id number]

## 2020-05-31 PROCEDURE — 99232 SBSQ HOSP IP/OBS MODERATE 35: CPT

## 2020-05-31 RX ORDER — QUETIAPINE FUMARATE 200 MG/1
100 TABLET, FILM COATED ORAL AT BEDTIME
Refills: 0 | Status: DISCONTINUED | OUTPATIENT
Start: 2020-05-31 | End: 2020-06-07

## 2020-05-31 RX ORDER — QUETIAPINE FUMARATE 200 MG/1
100 TABLET, FILM COATED ORAL DAILY
Refills: 0 | Status: DISCONTINUED | OUTPATIENT
Start: 2020-05-31 | End: 2020-06-12

## 2020-05-31 RX ORDER — HALOPERIDOL DECANOATE 100 MG/ML
1 INJECTION INTRAMUSCULAR ONCE
Refills: 0 | Status: COMPLETED | OUTPATIENT
Start: 2020-05-31 | End: 2020-05-31

## 2020-05-31 RX ORDER — HALOPERIDOL DECANOATE 100 MG/ML
0.5 INJECTION INTRAMUSCULAR EVERY 6 HOURS
Refills: 0 | Status: DISCONTINUED | OUTPATIENT
Start: 2020-05-31 | End: 2020-06-03

## 2020-05-31 RX ADMIN — ENOXAPARIN SODIUM 40 MILLIGRAM(S): 100 INJECTION SUBCUTANEOUS at 17:51

## 2020-05-31 RX ADMIN — CHLORHEXIDINE GLUCONATE 15 MILLILITER(S): 213 SOLUTION TOPICAL at 05:17

## 2020-05-31 RX ADMIN — Medication 0.5 MILLIGRAM(S): at 05:16

## 2020-05-31 RX ADMIN — Medication 100 MILLIGRAM(S): at 05:16

## 2020-05-31 RX ADMIN — OXYCODONE HYDROCHLORIDE 5 MILLIGRAM(S): 5 TABLET ORAL at 11:35

## 2020-05-31 RX ADMIN — PANTOPRAZOLE SODIUM 40 MILLIGRAM(S): 20 TABLET, DELAYED RELEASE ORAL at 11:35

## 2020-05-31 RX ADMIN — Medication 100 MILLIGRAM(S): at 11:34

## 2020-05-31 RX ADMIN — CHLORHEXIDINE GLUCONATE 15 MILLILITER(S): 213 SOLUTION TOPICAL at 17:51

## 2020-05-31 RX ADMIN — Medication 90 MILLIGRAM(S): at 05:16

## 2020-05-31 RX ADMIN — QUETIAPINE FUMARATE 100 MILLIGRAM(S): 200 TABLET, FILM COATED ORAL at 21:28

## 2020-05-31 RX ADMIN — ENOXAPARIN SODIUM 40 MILLIGRAM(S): 100 INJECTION SUBCUTANEOUS at 05:16

## 2020-05-31 RX ADMIN — Medication 100 MILLIGRAM(S): at 21:28

## 2020-05-31 RX ADMIN — HALOPERIDOL DECANOATE 0.5 MILLIGRAM(S): 100 INJECTION INTRAMUSCULAR at 11:34

## 2020-05-31 RX ADMIN — Medication 90 MILLIGRAM(S): at 17:50

## 2020-05-31 RX ADMIN — Medication 100 MILLIGRAM(S): at 13:10

## 2020-05-31 RX ADMIN — Medication 2 MILLIGRAM(S): at 21:28

## 2020-05-31 RX ADMIN — OXYCODONE HYDROCHLORIDE 5 MILLIGRAM(S): 5 TABLET ORAL at 17:50

## 2020-05-31 RX ADMIN — OXYCODONE HYDROCHLORIDE 5 MILLIGRAM(S): 5 TABLET ORAL at 05:16

## 2020-05-31 RX ADMIN — Medication 0.5 MILLIGRAM(S): at 13:10

## 2020-05-31 RX ADMIN — GABAPENTIN 400 MILLIGRAM(S): 400 CAPSULE ORAL at 13:10

## 2020-05-31 RX ADMIN — QUETIAPINE FUMARATE 50 MILLIGRAM(S): 200 TABLET, FILM COATED ORAL at 05:16

## 2020-05-31 RX ADMIN — Medication 2 MILLIGRAM(S): at 00:26

## 2020-05-31 RX ADMIN — GABAPENTIN 400 MILLIGRAM(S): 400 CAPSULE ORAL at 05:16

## 2020-05-31 RX ADMIN — QUETIAPINE FUMARATE 50 MILLIGRAM(S): 200 TABLET, FILM COATED ORAL at 11:34

## 2020-05-31 RX ADMIN — HALOPERIDOL DECANOATE 0.5 MILLIGRAM(S): 100 INJECTION INTRAMUSCULAR at 17:50

## 2020-05-31 RX ADMIN — Medication 0.5 MILLIGRAM(S): at 21:28

## 2020-05-31 RX ADMIN — HALOPERIDOL DECANOATE 1 MILLIGRAM(S): 100 INJECTION INTRAMUSCULAR at 09:00

## 2020-05-31 RX ADMIN — CLOPIDOGREL BISULFATE 75 MILLIGRAM(S): 75 TABLET, FILM COATED ORAL at 11:34

## 2020-05-31 RX ADMIN — Medication 81 MILLIGRAM(S): at 11:35

## 2020-05-31 RX ADMIN — GABAPENTIN 400 MILLIGRAM(S): 400 CAPSULE ORAL at 21:28

## 2020-05-31 RX ADMIN — Medication 90 MILLIGRAM(S): at 11:37

## 2020-05-31 NOTE — PROGRESS NOTE ADULT - ASSESSMENT
PROBLEMS;    s/p staph bactermia/sputum pseudomonas/staph  Ac on chronic hypercapnic & hypoxamic respiratory failure-s/p trach & PEG  sputum-Few Pseudomonas aeruginosa (Carbapenem Resistant)/Moderate Methicillin resistant Staphylococcus aureus  afib with RVR  [facility]AY  AC flare of COPD/chronic vs acute on chronic bronchitis  Mild interstitial lung disease-NSIP h/o smoking  COVID negativex2  Pulmonary hypertension  Nonobstructing stone in the left ureterovesicular junction/ nonobstructing stone in the lower pole right kidney.  Subacute hemorrhage in the right rectus abdominis along the anterior sheath, measures 1.6 cm in thickness and extends from the umbilicus to the inferior myotendinous junction  Cirrhosis  Mild splenomegaly-portal venous hypertension.  Chronic afib w Watchman device  CHF  BPH  GERD  ETOH abuse  seizures disorder    PLAN;    pulmonary slow recovery- possible placement for rehab  vent support-pat multiple failed weaning attempts- s/p trach weaning as tolerated  iv ancef for bactermia  off iv cipro for pseudomonas  Tube feeding as tolerated  supportive care  covid repeat testing-neg  Eliquis/asa 81  d/w staff

## 2020-05-31 NOTE — PROGRESS NOTE ADULT - SUBJECTIVE AND OBJECTIVE BOX
[facility]  ICU # 36  Vent # 28  Trach and PEG # 14    COVID-19 NEGATIVE x2    66 y/o M admitted on 4/18 with COPD exacerbation -- RRT called on 4/23 for AMS requiring intubation  extubated on 4/26 then re intubated on 4/29.    S/P Trach and PEG on 5/15 and has failed SBT.    Most recent active issues is persistent MSSA sepsis and CRP in sputum--on IV vanco and Cipro.  No central access      5/22:  remains intubated, encephalopathy.  (+) MSSA + in Bcx  5/23:  Tolerating limited PSV - persistent poor mentation.  Repeat BCx sent.  Abx changed to Ancef  5/24:  Rapid AFib O/N.  Still Encephalopathy.  COVID sent.  5/23 BCx still P.        above d/w Dr Dorado who has been caring for the patient.    5/25: cultures from 5/23 remain NGTD - COVID swab again negative on 5/24.    Hemodynamics reasonable.    Remains intubated, NOT sedated, mentation remains poor.  TF in progress.  On Abx.    5/26: for MYA this morning.  Persistent AMS - hemodynamics reasonable.  Remains vent dependent.  Addendum: NO evidence of cardiac vegetations noted by cardiologist during MYA.    5/27: hemodynamics reasonable.  remains vent dependent - only tolerating limited periods of PSV.  d/w Dr Lane this morning.    CXR personally reviewed.    5/29: no new issues, no overnight events - hemodynamics reasonable - tolerating limited PSV.    Issues/plans/prognosis and questions addressed with tracee Mcghee via telephone yesterday.    5/31: no new issues, no overnight events - hemodynamics reasonable - tolerating limited PSV.    Remains restless and confused at times.     PMHx - chronic AFib w Watchman device, CHF, COPD, HTN, obesity           Allergies    Ceclor (Rash)  Duricef (Rash)      ICU Vital Signs Last 24 Hrs  T(C): 37.4 (31 May 2020 04:00), Max: 37.4 (31 May 2020 04:00)  T(F): 99.4 (31 May 2020 04:00), Max: 99.4 (31 May 2020 04:00)  HR: 126 (31 May 2020 07:00) (67 - 141)  BP: 135/86 (31 May 2020 07:00) (97/45 - 149/71)  BP(mean): 98 (31 May 2020 07:00) (52 - 115)  RR: 31 (31 May 2020 07:00) (19 - 32)  SpO2: 96% (31 May 2020 07:00) (93% - 100%)      Mode: AC/ CMV (Assist Control/ Continuous Mandatory Ventilation)  RR (machine): 15  TV (machine): 450  FiO2: 40  PEEP: 5  ITime: 1  MAP: 13  PIP: 30      I&O's Summary    30 May 2020 07:01  -  31 May 2020 07:00  --------------------------------------------------------  IN: 2460 mL / OUT: 1350 mL / NET: 1110 mL                              8.1    5.20  )-----------( 227      ( 30 May 2020 07:23 )             26.0       05-30    139  |  105  |  10  ----------------------------<  94  3.7   |  31  |  0.49<L>    Ca    8.5      30 May 2020 07:23  Phos  3.8     05-30  Mg     1.6     05-30      MEDICATIONS  (STANDING):  ALBUTerol    90 MICROgram(s) HFA Inhaler 2 Puff(s) Inhalation every 4 hours  ALPRAZolam 0.5 milliGRAM(s) Oral every 8 hours  aspirin  chewable 81 milliGRAM(s) Oral daily  budesonide 160 MICROgram(s)/formoterol 4.5 MICROgram(s) Inhaler 2 Puff(s) Inhalation two times a day  ceFAZolin   IVPB 2000 milliGRAM(s) IV Intermittent every 8 hours  chlorhexidine 0.12% Liquid 15 milliLiter(s) Oral Mucosa every 12 hours  clopidogrel Tablet 75 milliGRAM(s) Oral daily  diltiazem    Tablet 90 milliGRAM(s) Oral every 6 hours  doxazosin 2 milliGRAM(s) Oral at bedtime  enoxaparin Injectable 40 milliGRAM(s) SubCutaneous every 12 hours  gabapentin 400 milliGRAM(s) Oral three times a day  pantoprazole  Injectable 40 milliGRAM(s) IV Push daily  QUEtiapine 50 milliGRAM(s) Oral every 12 hours  thiamine 100 milliGRAM(s) Oral daily  tiotropium 18 MICROgram(s) Capsule 1 Capsule(s) Inhalation daily    MEDICATIONS  (PRN):  acetaminophen   Tablet .. 650 milliGRAM(s) Oral every 6 hours PRN Temp greater or equal to 38.5C (101.3F)  ibuprofen  Tablet. 400 milliGRAM(s) Oral every 6 hours PRN Moderate Pain (4 - 6)  metoclopramide Injectable 10 milliGRAM(s) IV Push every 8 hours PRN Vomiting  oxyCODONE    IR 5 milliGRAM(s) Oral every 6 hours PRN Severe Pain (7 - 10)        Review of Systems:   · Additional ROS	Unobtainable due to clinical condition	    Physical Exam:   · Constitutional	detailed exam	  · Constitutional Details	ill; restless but poorly cooperative	  · ENMT	detailed exam	  · ENMT Comments	Trach in situ connected to Vent	  · Respiratory	detailed exam	  · Respiratory Details	breath sounds equal B/L; NO sig W/R/R	  · Cardiovascular	detailed exam	  · Cardiovascular Details	irregular rate and rhythm	  · Gastrointestinal	detailed exam	  · GI Normal	soft; NT/ND (+) BS	  · GI Abnormal	+ PEG in situ; Site is CDI	  · Extremities	detailed exam	  · Extremities Details	no cyanosis; LLE BKA	  · Neurological	detailed exam	  · Neurological Details	Encephalopathy persists; minimally interactive; MARIE spont NO gross motor or sensory deficits	  · Skin	detailed exam	  · Skin Details	warm and dry	        DVT Prophylaxis: Lovenox; venodynes

## 2020-05-31 NOTE — PROGRESS NOTE ADULT - SUBJECTIVE AND OBJECTIVE BOX
Subjective:    pat condition unchanged, on vent.    Home Medications:  albuterol 2.5 mg/3 mL (0.083%) inhalation solution: 3 milliliter(s) inhaled every 6 hours, As Needed -for shortness of breath and/or wheezing (19 Apr 2020 03:12)  gabapentin 400 mg oral capsule: 1 cap(s) orally 3 times a day (19 Apr 2020 03:12)  pantoprazole 40 mg oral granule, enteric coated: 40 milligram(s) orally once a day (19 Apr 2020 03:12)  Spiriva 18 mcg inhalation capsule: 1 cap(s) inhaled once a day (19 Apr 2020 03:12)  tamsulosin 0.4 mg oral capsule: 1 cap(s) orally once a day (at bedtime) (19 Apr 2020 03:12)  traZODone 50 mg oral tablet: 2 tab(s) orally once a day (at bedtime), As Needed (19 Apr 2020 03:12)    MEDICATIONS  (STANDING):  ALBUTerol    90 MICROgram(s) HFA Inhaler 2 Puff(s) Inhalation every 4 hours  ALPRAZolam 0.5 milliGRAM(s) Oral every 8 hours  aspirin  chewable 81 milliGRAM(s) Oral daily  budesonide 160 MICROgram(s)/formoterol 4.5 MICROgram(s) Inhaler 2 Puff(s) Inhalation two times a day  ceFAZolin   IVPB 2000 milliGRAM(s) IV Intermittent every 8 hours  chlorhexidine 0.12% Liquid 15 milliLiter(s) Oral Mucosa every 12 hours  clopidogrel Tablet 75 milliGRAM(s) Oral daily  diltiazem    Tablet 90 milliGRAM(s) Oral every 6 hours  doxazosin 2 milliGRAM(s) Oral at bedtime  enoxaparin Injectable 40 milliGRAM(s) SubCutaneous every 12 hours  gabapentin 400 milliGRAM(s) Oral three times a day  haloperidol     Tablet 0.5 milliGRAM(s) Oral every 6 hours  pantoprazole  Injectable 40 milliGRAM(s) IV Push daily  QUEtiapine 50 milliGRAM(s) Oral daily  QUEtiapine 100 milliGRAM(s) Oral at bedtime  thiamine 100 milliGRAM(s) Oral daily  tiotropium 18 MICROgram(s) Capsule 1 Capsule(s) Inhalation daily    MEDICATIONS  (PRN):  acetaminophen   Tablet .. 650 milliGRAM(s) Oral every 6 hours PRN Temp greater or equal to 38.5C (101.3F)  ibuprofen  Tablet. 400 milliGRAM(s) Oral every 6 hours PRN Moderate Pain (4 - 6)  oxyCODONE    IR 5 milliGRAM(s) Oral every 6 hours PRN Severe Pain (7 - 10)      Allergies    Ceclor (Rash)  Duricef (Rash)    Intolerances        Vital Signs Last 24 Hrs  T(C): 37.4 (31 May 2020 08:00), Max: 37.4 (31 May 2020 04:00)  T(F): 99.4 (31 May 2020 08:00), Max: 99.4 (31 May 2020 04:00)  HR: 130 (31 May 2020 11:00) (67 - 141)  BP: 132/74 (31 May 2020 11:00) (97/45 - 149/71)  BP(mean): 87 (31 May 2020 11:00) (57 - 103)  RR: 25 (31 May 2020 11:00) (22 - 32)  SpO2: 99% (31 May 2020 11:00) (94% - 100%)  Mode: AC/ CMV (Assist Control/ Continuous Mandatory Ventilation)  RR (machine): 15  TV (machine): 450  FiO2: 40  PEEP: 5  ITime: 1  MAP: 13  PIP: 31      PHYSICAL EXAMINATION:    NECK:  Supple. No lymphadenopathy. Jugular venous pressure not elevated. Carotids equal.   HEART:   The cardiac impulse has a normal quality. Reg., Nl S1 and S2.  There are no murmurs, rubs or gallops noted  CHEST:  Chest is clear to auscultation. Normal respiratory effort.  ABDOMEN:  Soft and nontender.   EXTREMITIES:  There is no edema.       LABS:                        8.1    5.20  )-----------( 227      ( 30 May 2020 07:23 )             26.0     05-30    139  |  105  |  10  ----------------------------<  94  3.7   |  31  |  0.49<L>    Ca    8.5      30 May 2020 07:23  Phos  3.8     05-30  Mg     1.6     05-30

## 2020-05-31 NOTE — PROGRESS NOTE ADULT - ASSESSMENT
64 y/o M admitted on 4/18 with COPD exacerbation -- RRT called on 4/23 for AMS requiring intubation  extubated on 4/26 then re intubated on 4/29.    S/P Trach and PEG on 5/15 and has failed SBT.    Most recent active issues is persistent MSSA sepsis and CRP in sputum--on IV vanco and Cipro.  No central access    COVID Negative on 5/24  COVID-19 on 5/30 was NEGATIVE - in prep for 6/1 discharge    pt continues to suffer from:  chronic resp failure requiring ventilator support  TM Encephalopathy with restlessness/agitation  concern for endocarditis as cause of bacteremia with MSSA - NO evidence of vegetations on MYA 5/26.  Blood cultures 5/23 & 5/29: still NGTD  atrial fibrillation RVR - better controlled at present  urinary retention  S/p trach and PEG      PMHx - chronic AFib w Watchman device, CHF, COPD, HTN, obesity     continues to be at risk for deterioration, morbidity and mortality given current circumstances.    Plan at this time is for discharge to NH/rehab on 6/1  VAP prevention bundle  SBT as tolerated daily  IV vanco, IV Cipro- currently on Ancef - continue 14 days from 5/23 (negative) blood cultures (to finish on 6/6)  Xanax 0.5g q 12  Cont ASA for CAD and AFib-- Diltiazem 90mg q6   Plavix    stopped (5/26) Apixiban - No need with Watchman device  chemical VTE prophylaxis Lovenox 40mg SC q12h based on BMI  Increase Seroquel to 100mg HS and 50mg daily  add Haldol 0.5mg PO q6h  continue Xanax   TF  HOB > 30  Cardura for urinary retention    Supportive care  ICU care--d/w ICU staff on multi disciplinary rounds  Discussed with social work - discharge planning.    Spoke with daughter who is aware of issues/plans and pending discharge to NH.      Attending Attestation:   30 minutes spent on total encounter; more than 50% of the visit was spent counseling and/or coordinating care by the attending physician.

## 2020-06-01 LAB
ANION GAP SERPL CALC-SCNC: 4 MMOL/L — LOW (ref 5–17)
BUN SERPL-MCNC: 10 MG/DL — SIGNIFICANT CHANGE UP (ref 7–23)
CALCIUM SERPL-MCNC: 9.3 MG/DL — SIGNIFICANT CHANGE UP (ref 8.5–10.1)
CHLORIDE SERPL-SCNC: 100 MMOL/L — SIGNIFICANT CHANGE UP (ref 96–108)
CO2 SERPL-SCNC: 32 MMOL/L — HIGH (ref 22–31)
CREAT SERPL-MCNC: 0.52 MG/DL — SIGNIFICANT CHANGE UP (ref 0.5–1.3)
GLUCOSE SERPL-MCNC: 88 MG/DL — SIGNIFICANT CHANGE UP (ref 70–99)
HCT VFR BLD CALC: 27.2 % — LOW (ref 39–50)
HGB BLD-MCNC: 8.4 G/DL — LOW (ref 13–17)
MAGNESIUM SERPL-MCNC: 1.6 MG/DL — SIGNIFICANT CHANGE UP (ref 1.6–2.6)
MCHC RBC-ENTMCNC: 30.1 PG — SIGNIFICANT CHANGE UP (ref 27–34)
MCHC RBC-ENTMCNC: 30.9 GM/DL — LOW (ref 32–36)
MCV RBC AUTO: 97.5 FL — SIGNIFICANT CHANGE UP (ref 80–100)
PHOSPHATE SERPL-MCNC: 4 MG/DL — SIGNIFICANT CHANGE UP (ref 2.5–4.5)
PLATELET # BLD AUTO: 249 K/UL — SIGNIFICANT CHANGE UP (ref 150–400)
POTASSIUM SERPL-MCNC: 3.8 MMOL/L — SIGNIFICANT CHANGE UP (ref 3.5–5.3)
POTASSIUM SERPL-SCNC: 3.8 MMOL/L — SIGNIFICANT CHANGE UP (ref 3.5–5.3)
RBC # BLD: 2.79 M/UL — LOW (ref 4.2–5.8)
RBC # FLD: 13.3 % — SIGNIFICANT CHANGE UP (ref 10.3–14.5)
SODIUM SERPL-SCNC: 136 MMOL/L — SIGNIFICANT CHANGE UP (ref 135–145)
WBC # BLD: 5.06 K/UL — SIGNIFICANT CHANGE UP (ref 3.8–10.5)
WBC # FLD AUTO: 5.06 K/UL — SIGNIFICANT CHANGE UP (ref 3.8–10.5)

## 2020-06-01 PROCEDURE — 99232 SBSQ HOSP IP/OBS MODERATE 35: CPT

## 2020-06-01 RX ORDER — CHLORHEXIDINE GLUCONATE 213 G/1000ML
15 SOLUTION TOPICAL EVERY 12 HOURS
Refills: 0 | Status: DISCONTINUED | OUTPATIENT
Start: 2020-06-01 | End: 2020-06-20

## 2020-06-01 RX ADMIN — Medication 100 MILLIGRAM(S): at 14:28

## 2020-06-01 RX ADMIN — Medication 90 MILLIGRAM(S): at 23:48

## 2020-06-01 RX ADMIN — HALOPERIDOL DECANOATE 0.5 MILLIGRAM(S): 100 INJECTION INTRAMUSCULAR at 05:56

## 2020-06-01 RX ADMIN — QUETIAPINE FUMARATE 100 MILLIGRAM(S): 200 TABLET, FILM COATED ORAL at 21:13

## 2020-06-01 RX ADMIN — Medication 90 MILLIGRAM(S): at 05:55

## 2020-06-01 RX ADMIN — ENOXAPARIN SODIUM 40 MILLIGRAM(S): 100 INJECTION SUBCUTANEOUS at 05:56

## 2020-06-01 RX ADMIN — GABAPENTIN 400 MILLIGRAM(S): 400 CAPSULE ORAL at 14:30

## 2020-06-01 RX ADMIN — HALOPERIDOL DECANOATE 0.5 MILLIGRAM(S): 100 INJECTION INTRAMUSCULAR at 01:16

## 2020-06-01 RX ADMIN — QUETIAPINE FUMARATE 50 MILLIGRAM(S): 200 TABLET, FILM COATED ORAL at 11:14

## 2020-06-01 RX ADMIN — Medication 90 MILLIGRAM(S): at 01:16

## 2020-06-01 RX ADMIN — PANTOPRAZOLE SODIUM 40 MILLIGRAM(S): 20 TABLET, DELAYED RELEASE ORAL at 11:16

## 2020-06-01 RX ADMIN — Medication 0.5 MILLIGRAM(S): at 18:12

## 2020-06-01 RX ADMIN — Medication 100 MILLIGRAM(S): at 21:13

## 2020-06-01 RX ADMIN — Medication 81 MILLIGRAM(S): at 11:15

## 2020-06-01 RX ADMIN — Medication 90 MILLIGRAM(S): at 18:12

## 2020-06-01 RX ADMIN — Medication 100 MILLIGRAM(S): at 05:56

## 2020-06-01 RX ADMIN — Medication 100 MILLIGRAM(S): at 11:14

## 2020-06-01 RX ADMIN — Medication 0.5 MILLIGRAM(S): at 23:49

## 2020-06-01 RX ADMIN — CHLORHEXIDINE GLUCONATE 15 MILLILITER(S): 213 SOLUTION TOPICAL at 18:11

## 2020-06-01 RX ADMIN — Medication 90 MILLIGRAM(S): at 11:14

## 2020-06-01 RX ADMIN — HALOPERIDOL DECANOATE 0.5 MILLIGRAM(S): 100 INJECTION INTRAMUSCULAR at 18:12

## 2020-06-01 RX ADMIN — CLOPIDOGREL BISULFATE 75 MILLIGRAM(S): 75 TABLET, FILM COATED ORAL at 11:14

## 2020-06-01 RX ADMIN — ENOXAPARIN SODIUM 40 MILLIGRAM(S): 100 INJECTION SUBCUTANEOUS at 18:13

## 2020-06-01 RX ADMIN — Medication 0.5 MILLIGRAM(S): at 05:55

## 2020-06-01 RX ADMIN — OXYCODONE HYDROCHLORIDE 5 MILLIGRAM(S): 5 TABLET ORAL at 21:50

## 2020-06-01 RX ADMIN — GABAPENTIN 400 MILLIGRAM(S): 400 CAPSULE ORAL at 05:56

## 2020-06-01 RX ADMIN — GABAPENTIN 400 MILLIGRAM(S): 400 CAPSULE ORAL at 21:13

## 2020-06-01 RX ADMIN — Medication 2 MILLIGRAM(S): at 21:13

## 2020-06-01 RX ADMIN — Medication 0.5 MILLIGRAM(S): at 11:14

## 2020-06-01 RX ADMIN — HALOPERIDOL DECANOATE 0.5 MILLIGRAM(S): 100 INJECTION INTRAMUSCULAR at 23:49

## 2020-06-01 RX ADMIN — HALOPERIDOL DECANOATE 0.5 MILLIGRAM(S): 100 INJECTION INTRAMUSCULAR at 11:15

## 2020-06-01 RX ADMIN — CHLORHEXIDINE GLUCONATE 15 MILLILITER(S): 213 SOLUTION TOPICAL at 05:55

## 2020-06-01 NOTE — CHART NOTE - NSCHARTNOTEFT_GEN_A_CORE
Spoke with Litzy via phone--[phone].  All concerns addressed including but not limited to diagnosis, treatment plan and overall prognosis.  Goal is to achieve tranquility for safe discharge.  Appears to be more lucid and able to follow simple commands than 2 weeks ago

## 2020-06-01 NOTE — PROGRESS NOTE ADULT - SUBJECTIVE AND OBJECTIVE BOX
Hospital # [id number]  ICU # 38  Vent # 22 and Trach and PEG # 16    CC:  Respiratory Failure     HPI:    66 y/o male w chronic AFib w Watchman device, HFpEF , COPD on home O2, HTN, ETOH use and pt of size admitted on 4/18 with COPD exacerbation--RRT called on 4/23 for AMS requiring intubation--extubated on 4/26 then re intubated on 4/29.  S/P Trach and PEG on 5/15 and has failed SBT.  Most recent active issues is persistent MSSA sepsis and CRP in sputum--on IV vanco and Cipro.  No central access      5/22:  Pt seen and examined in ICU--vented-- Encephalopathy.  still MSSA + in Bcx  5/23:  Tolerated PSV 12 x 3 hrs on 5/22.  SBT ongoing now.  Poor mentation.  Repeat BCx sent.  Abx changed to Ancef  5/24:  PSV X 4.5 hrs on 5/23.  Rapid AFib O/N.  Still Encephalopathy.  COVID sent.  5/23 BCx still P.     6/1:  Case d/w Dr horne.  Pt seen and examined in ICU--T+P--attempts to follow commands but overall Encephalopathic.  SBT ongoing.  Tm 99.6    PMH:  As above.     PSH:  As above.     FH: Non Contributory other than those listed in HPI    Social History:  Unobtainable due to clinical condition     MEDICATIONS  (STANDING):  ALBUTerol    90 MICROgram(s) HFA Inhaler 2 Puff(s) Inhalation every 4 hours  ALPRAZolam 0.5 milliGRAM(s) Oral every 6 hours  aspirin  chewable 81 milliGRAM(s) Oral daily  budesonide 160 MICROgram(s)/formoterol 4.5 MICROgram(s) Inhaler 2 Puff(s) Inhalation two times a day  ceFAZolin   IVPB 2000 milliGRAM(s) IV Intermittent every 8 hours  chlorhexidine 0.12% Liquid 15 milliLiter(s) Oral Mucosa every 12 hours  clopidogrel Tablet 75 milliGRAM(s) Oral daily  diltiazem    Tablet 90 milliGRAM(s) Oral every 6 hours  doxazosin 2 milliGRAM(s) Oral at bedtime  enoxaparin Injectable 40 milliGRAM(s) SubCutaneous every 12 hours  gabapentin 400 milliGRAM(s) Oral three times a day  haloperidol     Tablet 0.5 milliGRAM(s) Oral every 6 hours  pantoprazole  Injectable 40 milliGRAM(s) IV Push daily  QUEtiapine 50 milliGRAM(s) Oral daily  QUEtiapine 100 milliGRAM(s) Oral at bedtime  thiamine 100 milliGRAM(s) Oral daily  tiotropium 18 MICROgram(s) Capsule 1 Capsule(s) Inhalation daily    MEDICATIONS  (PRN):  acetaminophen   Tablet .. 650 milliGRAM(s) Oral every 6 hours PRN Temp greater or equal to 38.5C (101.3F)  ibuprofen  Tablet. 400 milliGRAM(s) Oral every 6 hours PRN Moderate Pain (4 - 6)  oxyCODONE    IR 5 milliGRAM(s) Oral every 6 hours PRN Severe Pain (7 - 10)      Allergies: NKDA    ROS:  SEE BELOW        ICU Vital Signs Last 24 Hrs  T(C): 37.5 (01 Jun 2020 05:00), Max: 37.6 (31 May 2020 22:00)  T(F): 99.5 (01 Jun 2020 05:00), Max: 99.6 (31 May 2020 22:00)  HR: 108 (01 Jun 2020 08:13) (81 - 130)  BP: 127/66 (01 Jun 2020 07:00) (85/53 - 146/107)  BP(mean): 81 (01 Jun 2020 07:00) (58 - 117)  ABP: --  ABP(mean): --  RR: 23 (01 Jun 2020 07:00) (19 - 32)  SpO2: 98% (01 Jun 2020 08:13) (92% - 100%)      Mode: AC/ CMV (Assist Control/ Continuous Mandatory Ventilation)  RR (machine): 15  TV (machine): 450  FiO2: 40  PEEP: 5  ITime: 1  MAP: 18  PIP: 50      I&O's Summary    31 May 2020 07:01  -  01 Jun 2020 07:00  --------------------------------------------------------  IN: 1834 mL / OUT: 3375 mL / NET: -1541 mL        Physical Exam:  SEE BELOW                          8.4    5.06  )-----------( 249      ( 01 Jun 2020 06:45 )             27.2       06-01    136  |  100  |  10  ----------------------------<  88  3.8   |  32<H>  |  0.52    Ca    9.3      01 Jun 2020 06:45  Phos  4.0     06-01  Mg     1.6     06-01                      DVT Prophylaxis:                                                            Contraindication:     Advanced Directives:    Discussed with:    Visit Information:  Time spent excluding procedure:      ** Time is exclusive of billed procedures and/or teaching and/or routine family updates.

## 2020-06-01 NOTE — PROGRESS NOTE ADULT - SUBJECTIVE AND OBJECTIVE BOX
Date of service: 06-01-20 @ 10:08    Lying in bed in NAD  Weak looking  Alert and restless  Has low grade fever  On ventilatory support    ROS: unobtainable    MEDICATIONS  (STANDING):  ALBUTerol    90 MICROgram(s) HFA Inhaler 2 Puff(s) Inhalation every 4 hours  ALPRAZolam 0.5 milliGRAM(s) Oral every 6 hours  aspirin  chewable 81 milliGRAM(s) Oral daily  budesonide 160 MICROgram(s)/formoterol 4.5 MICROgram(s) Inhaler 2 Puff(s) Inhalation two times a day  ceFAZolin   IVPB 2000 milliGRAM(s) IV Intermittent every 8 hours  clopidogrel Tablet 75 milliGRAM(s) Oral daily  diltiazem    Tablet 90 milliGRAM(s) Oral every 6 hours  doxazosin 2 milliGRAM(s) Oral at bedtime  enoxaparin Injectable 40 milliGRAM(s) SubCutaneous every 12 hours  gabapentin 400 milliGRAM(s) Oral three times a day  haloperidol     Tablet 0.5 milliGRAM(s) Oral every 6 hours  pantoprazole  Injectable 40 milliGRAM(s) IV Push daily  QUEtiapine 50 milliGRAM(s) Oral daily  QUEtiapine 100 milliGRAM(s) Oral at bedtime  thiamine 100 milliGRAM(s) Oral daily  tiotropium 18 MICROgram(s) Capsule 1 Capsule(s) Inhalation daily      Vital Signs Last 24 Hrs  T(C): 37.3 (01 Jun 2020 08:00), Max: 37.6 (31 May 2020 22:00)  T(F): 99.1 (01 Jun 2020 08:00), Max: 99.6 (31 May 2020 22:00)  HR: 116 (01 Jun 2020 09:00) (81 - 130)  BP: 137/70 (01 Jun 2020 09:00) (85/53 - 148/86)  BP(mean): 80 (01 Jun 2020 09:00) (58 - 117)  RR: 29 (01 Jun 2020 09:00) (19 - 32)  SpO2: 97% (01 Jun 2020 09:00) (92% - 100%)    Mode: AC/ CMV (Assist Control/ Continuous Mandatory Ventilation), RR (machine): 15, TV (machine): 450, FiO2: 40, PEEP: 5, ITime: 1, MAP: 18, PIP: 50    Physical exam:    Constitutional:  No acute distress  HEENT: NC/AT, EOMI, PERRLA, conjunctivae clear  Neck: supple; thyroid not palpable  Back: no tenderness  Respiratory: respiratory effort normal; b/l rhonchi  Cardiovascular: S1S2 regular, no murmurs  Abdomen: soft, not tender, not distended, positive BS  Genitourinary: no suprapubic tenderness  Lymphatic: no LN palpable  Musculoskeletal: no muscle tenderness, no joint swelling or tenderness  Extremities: no pedal edema  Right arm area of erythema with phlebitis - resolving  Neurological/ Psychiatric: confused; moving all extremities  Skin: no rashes; no palpable lesions    Labs: reviewed                        8.4    5.06  )-----------( 249      ( 01 Jun 2020 06:45 )             27.2     06-01    136  |  100  |  10  ----------------------------<  88  3.8   |  32<H>  |  0.52    Ca    9.3      01 Jun 2020 06:45  Phos  4.0     06-01  Mg     1.6     06-01                        8.4    5.89  )-----------( 213      ( 25 May 2020 07:31 )             27.0     05-25    142  |  106  |  9   ----------------------------<  125<H>  3.7   |  32<H>  |  0.47<L>    Ca    8.5      25 May 2020 07:31  Phos  3.5     05-25  Mg     1.4     05-25               Vancomycin Level, Trough: 9.8 ug/mL (05-21 @ 04:52)                                  12.5   8.31  )-----------( 197      ( 07 May 2020 08:15 )             37.6     05-07    137  |  100  |  33<H>  ----------------------------<  114<H>  4.9   |  31  |  0.83    Ca    8.5      07 May 2020 08:15  Phos  4.2     05-07  Mg     2.4     05-07      COVID-19 PCR . (04.26.20 @ 00:02)    COVID-19 PCR: NotDetec      Culture - Blood (collected 26 Apr 2020 01:01)  Source: .Blood None  Preliminary Report (27 Apr 2020 10:02):    No growth to date.    Culture - Blood (collected 26 Apr 2020 00:56)  Source: .Blood None  Preliminary Report (27 Apr 2020 10:02):    No growth to date.    Culture - Sputum (collected 25 Apr 2020 01:00)  Source: .Sputum Sputum  trap  Gram Stain (26 Apr 2020 12:29):    Moderate polymorphonuclear leukocytes per low power field    No Squamous epithelial cells per low power field    Rare Gram Positive Rods per oil power field    Rare Gram Positive Cocci in Pairs and Chains per oil power field  Final Report (28 Apr 2020 10:53):    Few Pseudomonas aeruginosa (Carbapenem Resistant)    Moderate Methicillin resistant Staphylococcus aureus    Normal Respiratory Faith present  Organism: Pseudomonas aeruginosa (Carbapenem Resistant)  Methicillin resistant Staphylococcus aureus (28 Apr 2020 10:44)  Organism: Pseudomonas aeruginosa (Carbapenem Resistant) (28 Apr 2020 10:44)      -  Amikacin: S <=16      -  Aztreonam: R >16      -  Cefepime: R >16      -  Ceftazidime: R >16      -  Ciprofloxacin: S <=0.25      -  Gentamicin: S 4      -  Imipenem: R >8      -  Levofloxacin: S <=0.5      -  Meropenem: R >8      -  Piperacillin/Tazobactam: R >64      -  Tobramycin: S <=2      Method Type: LADY  Organism: Methicillin resistant Staphylococcus aureus (28 Apr 2020 10:40)      -  Amoxicillin/Clavulanic Acid: R >4/2      -  Ampicillin: R >8      -  Ampicillin/Sulbactam: R <=8/4      -  Cefazolin: R >16      -  Ceftriaxone: R >32      -  Ciprofloxacin: R >2      -  Clindamycin: R >4      -  Daptomycin: S 0.5      -  Erythromycin: R >4      -  Gentamicin: S <=1 Should not be used as monotherapy      -  Levofloxacin: R >4      -  Linezolid: S 4      -  Meropenem: R >8      -  Moxifloxacin(Aerobic): R >4      -  Oxacillin: R >2      -  Penicillin: R >8      -  RIF- Rifampin: S <=1 Should not be used as monotherapy      -  Tetra/Doxy: S 2      -  Trimethoprim/Sulfamethoxazole: S <=0.5/9.5      -  Vancomycin: S 2      Method Type: LADY    Culture - Urine (collected 21 Apr 2020 15:51)  Source: .Urine Clean Catch (Midstream)  Final Report (22 Apr 2020 17:03):    <10,000 CFU/mL Normal Urogenital Faith    Culture - Blood (collected 18 Apr 2020 19:43)  Source: .Blood Blood-Peripheral  Final Report (24 Apr 2020 01:01):    No Growth Final    Culture - Blood (05.18.20 @ 11:44)    -  Staphylococcus aureus: Detec Any isolate of Staphylococcus aureus from a blood culture is NOT considered a contaminant.    -  RIF- Rifampin: S <=1 Should not be used as monotherapy    -  Penicillin: R >8    -  Oxacillin: S <=0.25    -  Tetra/Doxy: S <=1    -  Trimethoprim/Sulfamethoxazole: S <=0.5/9.5    -  Vancomycin: S 2    -  Clindamycin: R 0.5 This isolate is presumed to be clindamycin resistant based on detection of inducible resistance. Clindamycin may still be effective in some patients.    -  Gentamicin: S <=1 Should not be used as monotherapy    -  Erythromycin: R >4    -  Ampicillin/Sulbactam: S <=8/4    -  Cefazolin: S <=4    Gram Stain:   Growth in aerobic bottle: Gram Positive Cocci in Clusters  Growth in anaerobic bottle: Gram Positive Cocci in Clusters    Specimen Source: .Blood None    Organism: Staphylococcus aureus    Organism: Staphylococcus aureus    Culture Results:   Growth in aerobic and anaerobic bottles: Staphylococcus aureus    Organism Identification: Staphylococcus aureus  Staphylococcus aureus    Method Type: LADY    Method Type: PCR        Culture - Sputum . (05.18.20 @ 09:45)    -  Tobramycin: S <=2    -  Piperacillin/Tazobactam: R >64    -  Meropenem: R >8    -  Levofloxacin: S <=0.5    Gram Stain:   Numerous polymorphonuclear leukocytes per low power field  No Squamous epithelial cells per low power field  Numerous Gram Negative Rods per oil power field    -  Amikacin: S <=16    -  Cefepime: R >16    -  Aztreonam: R >16    -  Imipenem: R >8    -  Gentamicin: S 4    -  Ciprofloxacin: S <=0.25    -  Ceftazidime: R >16    Specimen Source: .Sputum Sputums trap    Culture Results:   Numerous Pseudomonas aeruginosa (Carbapenem Resistant)  Normal Respiratory Faith absent    Organism Identification: Pseudomonas aeruginosa (Carbapenem Resistant)    Organism: Pseudomonas aeruginosa (Carbapenem Resistant)    Method Type: LADY    Culture - Blood in AM (05.20.20 @ 06:41)    Gram Stain:   Growth in aerobic bottle: Gram Positive Cocci in Clusters  Growth in anaerobic bottle: Gram Positive Cocci in Clusters    Specimen Source: .Blood None    Culture Results:   Growth in aerobic and anaerobic bottles: Staphylococcus aureus  See previous culture [id number]    Culture - Blood (05.23.20 @ 07:07)    Specimen Source: .Blood None    Culture Results:   No growth to date.    Culture - Blood in AM (05.29.20 @ 06:49)    Specimen Source: .Blood None    Culture Results:   No growth to date.    COVID-19 PCR . (05.14.20 @ 08:10)    COVID-19 PCR: NotDetec    Radiology: all available radiological tests reviewed    < from: Xray Chest 1 View- PORTABLE-Urgent (05.18.20 @ 09:49) >  A tracheostomy tube is noted.  Lungs: There are no lung consolidations.  Heart: There is cardiomegaly.  Mediastinum: The mediastinum is within normal limits.    < end of copied text >    < from: MYA Complete w/Spect and Color (05.26.20 @ 13:17) >   Mitral Valve   Structurally normalmitral valve. Moderate mitral regurgitation, no   stenosis. No evidence of vegetations.     Aortic Valve   Structurally normal trileaflet aortic valve. No aortic regurgitation or   stenosis. No evidence of vegetations.     Tricuspid Valve   Structurally normal tricuspid valve. Moderate tricuspid regurgitation, no   stenosis. No evidence of vegetations.     Pulmonic Valve   Structurally normal pulmonic valve. Mild pulmonic regurgitation, no   stenosis. No evidence of vegetations.     Left Atrium  Moderate left atrial dilation.   Left atrial appendage occlusion device ("Watchman device") well seated in   left atrial appendage. No evidence of vegetation on this device.    < end of copied text >      Advanced directives addressed: full resuscitation

## 2020-06-01 NOTE — PROGRESS NOTE ADULT - SUBJECTIVE AND OBJECTIVE BOX
Subjective:    pat on PS weaning, agitated, no new events.    Home Medications:  albuterol 2.5 mg/3 mL (0.083%) inhalation solution: 3 milliliter(s) inhaled every 6 hours, As Needed -for shortness of breath and/or wheezing (19 Apr 2020 03:12)  gabapentin 400 mg oral capsule: 1 cap(s) orally 3 times a day (19 Apr 2020 03:12)  pantoprazole 40 mg oral granule, enteric coated: 40 milligram(s) orally once a day (19 Apr 2020 03:12)  Spiriva 18 mcg inhalation capsule: 1 cap(s) inhaled once a day (19 Apr 2020 03:12)  tamsulosin 0.4 mg oral capsule: 1 cap(s) orally once a day (at bedtime) (19 Apr 2020 03:12)  traZODone 50 mg oral tablet: 2 tab(s) orally once a day (at bedtime), As Needed (19 Apr 2020 03:12)    MEDICATIONS  (STANDING):  ALBUTerol    90 MICROgram(s) HFA Inhaler 2 Puff(s) Inhalation every 4 hours  ALPRAZolam 0.5 milliGRAM(s) Oral every 6 hours  aspirin  chewable 81 milliGRAM(s) Oral daily  budesonide 160 MICROgram(s)/formoterol 4.5 MICROgram(s) Inhaler 2 Puff(s) Inhalation two times a day  ceFAZolin   IVPB 2000 milliGRAM(s) IV Intermittent every 8 hours  clopidogrel Tablet 75 milliGRAM(s) Oral daily  diltiazem    Tablet 90 milliGRAM(s) Oral every 6 hours  doxazosin 2 milliGRAM(s) Oral at bedtime  enoxaparin Injectable 40 milliGRAM(s) SubCutaneous every 12 hours  gabapentin 400 milliGRAM(s) Oral three times a day  haloperidol     Tablet 0.5 milliGRAM(s) Oral every 6 hours  pantoprazole  Injectable 40 milliGRAM(s) IV Push daily  QUEtiapine 50 milliGRAM(s) Oral daily  QUEtiapine 100 milliGRAM(s) Oral at bedtime  thiamine 100 milliGRAM(s) Oral daily  tiotropium 18 MICROgram(s) Capsule 1 Capsule(s) Inhalation daily    MEDICATIONS  (PRN):  acetaminophen   Tablet .. 650 milliGRAM(s) Oral every 6 hours PRN Temp greater or equal to 38.5C (101.3F)  ibuprofen  Tablet. 400 milliGRAM(s) Oral every 6 hours PRN Moderate Pain (4 - 6)  oxyCODONE    IR 5 milliGRAM(s) Oral every 6 hours PRN Severe Pain (7 - 10)      Allergies    Ceclor (Rash)  Duricef (Rash)    Intolerances        Vital Signs Last 24 Hrs  T(C): 37.3 (01 Jun 2020 11:00), Max: 37.6 (31 May 2020 22:00)  T(F): 99.1 (01 Jun 2020 11:00), Max: 99.6 (31 May 2020 22:00)  HR: 77 (01 Jun 2020 13:00) (77 - 122)  BP: 105/58 (01 Jun 2020 13:00) (85/53 - 148/86)  BP(mean): 68 (01 Jun 2020 13:00) (58 - 117)  RR: 18 (01 Jun 2020 13:00) (18 - 32)  SpO2: 98% (01 Jun 2020 13:00) (91% - 100%)  Mode: AC/ CMV (Assist Control/ Continuous Mandatory Ventilation)  RR (machine): 15  TV (machine): 450  FiO2: 40  PEEP: 5  PS:   ITime: 1  MAP: 17  PIP: 36      PHYSICAL EXAMINATION:    NECK:  Supple. No lymphadenopathy. Jugular venous pressure not elevated. Carotids equal.   HEART:   The cardiac impulse has a normal quality. Reg., Nl S1 and S2.  There are no murmurs, rubs or gallops noted  CHEST:  Chest crackles to auscultation. Normal respiratory effort.  ABDOMEN:  Soft and nontender.   EXTREMITIES:  There is no edema.       LABS:                        8.4    5.06  )-----------( 249      ( 01 Jun 2020 06:45 )             27.2     06-01    136  |  100  |  10  ----------------------------<  88  3.8   |  32<H>  |  0.52    Ca    9.3      01 Jun 2020 06:45  Phos  4.0     06-01  Mg     1.6     06-01

## 2020-06-01 NOTE — PROGRESS NOTE ADULT - ASSESSMENT
PROBLEMS;    s/p staph bactermia/sputum pseudomonas/staph  Ac on chronic hypercapnic & hypoxamic respiratory failure-s/p trach & PEG  sputum-Few Pseudomonas aeruginosa (Carbapenem Resistant)/Moderate Methicillin resistant Staphylococcus aureus  afib with RVR  [facility]  AC flare of COPD/chronic vs acute on chronic bronchitis  Mild interstitial lung disease-NSIP h/o smoking  COVID negativex2  Pulmonary hypertension  Nonobstructing stone in the left ureterovesicular junction/ nonobstructing stone in the lower pole right kidney.  Subacute hemorrhage in the right rectus abdominis along the anterior sheath, measures 1.6 cm in thickness and extends from the umbilicus to the inferior myotendinous junction  Cirrhosis  Mild splenomegaly-portal venous hypertension.  Chronic afib w Watchman device  CHF  BPH  GERD  ETOH abuse  seizures disorder    PLAN;    pulmonary slow recovery- possible placement for rehab  vent support-pat multiple failed weaning attempts- s/p trach weaning as tolerated  iv ancef for bactermia  Tube feeding as tolerated  supportive care  covid repeat testing-neg  Eliquis/asa 81  d/w staff

## 2020-06-01 NOTE — PROGRESS NOTE ADULT - ASSESSMENT
64 y/o male with h/o chronic A.Fib with Watchman device, CHF, COPD, HTN, obesity was admitted on 4/18 for worsening SOB. In ER he was found with rapid A.fib and was placed on NRB. He tested COVID-19 PCR negative x 3. Noted with hypercapnea and placed on BiPAP, then intubated and placed on ventilatory support. He was extubated on 4/26. He is reported with MDRO's in sputum c/s. He was treated with azithromycin from 4/20 to 4/25.     1. Low grade fever. Persistent sepsis with MSSA. Right arm cellulitis/ phlebitis. Acute respiratory failure. 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 Allergy to PCN and cephalosporines. Possible COVID-19 syndrome.   -has low grade fever  -MYA no evidence of valvular vegetations or vegetation on Watchman device; doubt endocarditis  -encephalopathy   -cultures reviewed; repeat sputum c/s shows CRE-PSAE  -s/p cipro IV # two cycles of therapy  -s/p vancomycin 1 gm IV q12h # 10 days until 5/7  -respiratory care  -repeat BC x 2 are negative to date  -s/p vancomycin 1 gm IV q12h # 4 days  -on cefazolin 2 gm IV q8h # 7 # 11  -tolerating abx well so far; no side effects noted  -he had prior CRE-PSAE; new sputum culture shows PSAE again; CXR dose not show infiltrates  -f/u closely for rashes  -monitor closely in [facility] allergy history  -continue abx coverage with cefazolin for now   -f/u cultures  -monitor temps  -f/u CBC  -supportive care  2. Other issues:   -care per medicine

## 2020-06-01 NOTE — PROGRESS NOTE ADULT - ASSESSMENT
IMP:    66 y/o male w chronic AFib w Watchman device, HFpEF , COPD on home O2, HTN, ETOH use and pt of size admitted on 4/18 with COPD exacerbation--RRT called on 4/23 for AMS requiring intubation--extubated on 4/26 then re intubated on 4/29.  S/P Trach and PEG on 5/15 and has failed SBT.  Most recent active issues is persistent MSSA sepsis and CRP in sputum--Remains on IV Ancef and completed Cipro.  No central access   Chronic resp failure  TM Encephalopathy and agitation     Plan:    SBT as tolerated  IV vanco (5)--stopped on 5/22.  Ancef (11) and completed Cipro.  Repeat BCx 5/29 negative   Increase Xanax to 0.5 q6  Cont with Haldol, Seroquel and Oxy (QTc < 450)  Cont clopidogrel and ASA for CAD and AFib--Dilt increased to 90 q6   TF to goal  HOB > 30  Started cardura   DVT prophy--SC and LMWH    ICU care--d/w ICU staff on multi disciplinary rounds

## 2020-06-01 NOTE — PROGRESS NOTE ADULT - SUBJECTIVE AND OBJECTIVE BOX
Subjective: Patient remains confused.    Vital Signs:  Vital Signs Last 24 Hrs  T(C): 37.3 (06-01-20 @ 11:00), Max: 37.6 (05-31-20 @ 22:00)  T(F): 99.1 (06-01-20 @ 11:00), Max: 99.6 (05-31-20 @ 22:00)  HR: 114 (06-01-20 @ 11:22) (81 - 122)  BP: 111/58 (06-01-20 @ 11:00) (85/53 - 148/86)  RR: 29 (06-01-20 @ 11:00) (19 - 32)  SpO2: 91% on 40% FiO2 with PEEP 5    Telemetry/Alarms: atrial fibrillation    Relevant labs, radiology and Medications reviewed                        8.4    5.06  )-----------( 249      ( 01 Jun 2020 06:45 )             27.2     06-01    136  |  100  |  10  ----------------------------<  88  3.8   |  32<H>  |  0.52    Ca    9.3      01 Jun 2020 06:45  Phos  4.0     06-01  Mg     1.6     06-01        MEDICATIONS  (STANDING):  ALBUTerol    90 MICROgram(s) HFA Inhaler 2 Puff(s) Inhalation every 4 hours  ALPRAZolam 0.5 milliGRAM(s) Oral every 6 hours  aspirin  chewable 81 milliGRAM(s) Oral daily  budesonide 160 MICROgram(s)/formoterol 4.5 MICROgram(s) Inhaler 2 Puff(s) Inhalation two times a day  ceFAZolin   IVPB 2000 milliGRAM(s) IV Intermittent every 8 hours  clopidogrel Tablet 75 milliGRAM(s) Oral daily  diltiazem    Tablet 90 milliGRAM(s) Oral every 6 hours  doxazosin 2 milliGRAM(s) Oral at bedtime  enoxaparin Injectable 40 milliGRAM(s) SubCutaneous every 12 hours  gabapentin 400 milliGRAM(s) Oral three times a day  haloperidol     Tablet 0.5 milliGRAM(s) Oral every 6 hours  pantoprazole  Injectable 40 milliGRAM(s) IV Push daily  QUEtiapine 50 milliGRAM(s) Oral daily  QUEtiapine 100 milliGRAM(s) Oral at bedtime  thiamine 100 milliGRAM(s) Oral daily  tiotropium 18 MICROgram(s) Capsule 1 Capsule(s) Inhalation daily    MEDICATIONS  (PRN):  acetaminophen   Tablet .. 650 milliGRAM(s) Oral every 6 hours PRN Temp greater or equal to 38.5C (101.3F)  ibuprofen  Tablet. 400 milliGRAM(s) Oral every 6 hours PRN Moderate Pain (4 - 6)  oxyCODONE    IR 5 milliGRAM(s) Oral every 6 hours PRN Severe Pain (7 - 10)      Physical exam  Gen: NAD  Neuro: AO, confused  Card: S1 S2  Pulm: Coarse breath sounds bilaterally, trach in place  Abd: Soft, PEG in place  Ext: moderate edema    I&O's Summary    31 May 2020 07:01  -  01 Jun 2020 07:00  --------------------------------------------------------  IN: 1834 mL / OUT: 3375 mL / NET: -1541 mL        Assessment  65y Male  w/ PAST MEDICAL & SURGICAL HISTORY:  Chronic CHF  COPD without exacerbation  Atrial fibrillation  Presence of Watchman left atrial appendage closure device  Hypertension  GERD (gastroesophageal reflux disease)  Chronic obstructive pulmonary disease (COPD)  Anemia  Falls  Meningitis  Collapsed lung  Alcohol withdrawal  Emphysema of lung  Cirrhosis  CHF (congestive heart failure)  Poor historian  Alcohol abuse  Chronic atrial fibrillation  Unilateral amputation of lower extremity below knee  Presence of Watchman left atrial appendage closure device  S/P BKA (below knee amputation) unilateral, left   Patient is a 65y old  Male who presents with a chief complaint of acute hypoxemic, hypercapneic resp failure  COPD exacerbation (01 Jun 2020 10:04)    1.  COPD with acute exacerbation  2.  Acute on chronic respiratory failure requiring tracheostomy  3.  MRSA/pseudomonas in sputum    PLAN  Neuro: continue haldol/seroquel for agitation/confusion  Pulm: CPAP as tolerated  Cardio: Monitor telemetry/alarms  GI: Tolerating diet, TF to goal  Renal: monitor urine output, supplement electrolytes as needed  Vasc: Heparin SC/SCDs for DVT prophylaxis  Heme: Stable H/H. .   ID: Ancef      Discussed with Cardiothoracic Team at AM rounds.

## 2020-06-01 NOTE — CHART NOTE - NSCHARTNOTEFT_GEN_A_CORE
Assessment:   *pt s/p trach and PEG on 5/15.  Most recent active issues is persistent MSSA sepsis and CRP in sputum--Remains on IV Ancef and completed Cipro.  No central access.  chronic resp failure. TM Encephalopathy and agitation.    *labs reviewed; 06-01 Na136 mmol/L Glu 88 mg/dL K+ 3.8 mmol/L Cr  0.52 mg/dL BUN 10 mg/dL Phos 4.0 mg/dL Alb n/a   PAB n/a     *magnesium at lower end of normal.  other lytes WNL.    *(+2) generalized.  (+3) Lt/Rt arm edema.      *urine output: 3375mL.  BM (+) soft on 6/1.    *per flowsheet, pt has received an average of ~1216mL/day of Jevity 1.5 over the past 4 days.  combined with prosource TF and banatrol, pt received ~2104Kcal and 128g protein.  Which met ~94% of estimated calorie needs and ~88% of estimated protein needs.   Based on average amount of time TF was provided and to better meet estimated protein needs, rec'd change TF Rx to Jevity 1.5 @ 80mL/hr with 6pkts Prosource TF.  Which will provide ~2400Kcal, 158g protein, 1094mL free water, and total TF volume of 1440mL.       Recommendations:  1) change TF Rx to Jevity 1.5 @ 80mL/hr with 6pkts Prosource TF  2) monitor hydration status; adjust free water flushes prn  3) monitor TF tolerance; keep back of bed > 35 degrees  4) obtain new wt when feasible      Diet Prescription: Diet, NPO with Tube Feed:   Tube Feeding Modality: Gastrostomy  Jevity 1.5 Randall (JEVITY1.5)  Total Volume for 24 Hours (mL): 1920  Continuous  Until Goal Tube Feed Rate (mL per Hour): 80  Tube Feed Duration (in Hours): 24  Tube Feed Start Time: 00:00  No Carb Prosource TF     Qty per Day:  4  Banatrol TF     Qty per Day:  3 (05-22-20 @ 11:05)      Wt Hx:  no new wt since 5/9  117.1Kg (5/9)  117.9Kg (4/18)      05-31-20 @ 07:01  -  06-01-20 @ 07:00  --------------------------------------------------------  IN: 1834 mL / OUT: 3375 mL / NET: -1541 mL        Estimated Needs:   2250-2700Kcal (25-30Kcal/Kg of adjust BW; 90Kg)  146-162g protein (1.8-2g/Kg IBW: 81Kg)  2250-2700mL (25-30mL/Kg of adjusted BW: 90Kg)      Pertinent Labs: 06-01 Na136 mmol/L Glu 88 mg/dL K+ 3.8 mmol/L Cr  0.52 mg/dL BUN 10 mg/dL 06-01 Phos 4.0 mg/dL 05-10 Chol --    LDL --    HDL --    Trig 106 mg/dL      Skin: karla score = 12  SDTI on Rt buttock  stage 2 PU on Lt buttock  skin tear on Rt hand    Monitoring and Evaluation:   [x] PO intake/Nutr support infusion [ x ] Tolerance to Nutr [ x ] weights [ x ] labs[ x ] follow up per protocol  [ ] other:

## 2020-06-02 ENCOUNTER — TRANSCRIPTION ENCOUNTER (OUTPATIENT)
Age: 65
End: 2020-06-02

## 2020-06-02 PROCEDURE — 99232 SBSQ HOSP IP/OBS MODERATE 35: CPT

## 2020-06-02 RX ORDER — TRAZODONE HCL 50 MG
2 TABLET ORAL
Qty: 0 | Refills: 0 | DISCHARGE

## 2020-06-02 RX ORDER — ACETAMINOPHEN 500 MG
2 TABLET ORAL
Qty: 0 | Refills: 0 | DISCHARGE
Start: 2020-06-02

## 2020-06-02 RX ORDER — ALBUTEROL 90 UG/1
2 AEROSOL, METERED ORAL
Qty: 0 | Refills: 0 | DISCHARGE
Start: 2020-06-02

## 2020-06-02 RX ORDER — DOXAZOSIN MESYLATE 4 MG
1 TABLET ORAL
Qty: 0 | Refills: 0 | DISCHARGE
Start: 2020-06-02

## 2020-06-02 RX ORDER — GABAPENTIN 400 MG/1
1 CAPSULE ORAL
Qty: 0 | Refills: 0 | DISCHARGE
Start: 2020-06-02

## 2020-06-02 RX ORDER — ASPIRIN/CALCIUM CARB/MAGNESIUM 324 MG
1 TABLET ORAL
Qty: 0 | Refills: 0 | DISCHARGE
Start: 2020-06-02

## 2020-06-02 RX ORDER — QUETIAPINE FUMARATE 200 MG/1
2 TABLET, FILM COATED ORAL
Qty: 0 | Refills: 0 | DISCHARGE
Start: 2020-06-02

## 2020-06-02 RX ORDER — ALPRAZOLAM 0.25 MG
1 TABLET ORAL
Qty: 0 | Refills: 0 | DISCHARGE
Start: 2020-06-02

## 2020-06-02 RX ORDER — DILTIAZEM HCL 120 MG
1 CAPSULE, EXT RELEASE 24 HR ORAL
Qty: 0 | Refills: 0 | DISCHARGE
Start: 2020-06-02

## 2020-06-02 RX ORDER — HALOPERIDOL DECANOATE 100 MG/ML
1 INJECTION INTRAMUSCULAR
Qty: 0 | Refills: 0 | DISCHARGE
Start: 2020-06-02

## 2020-06-02 RX ORDER — ALBUTEROL 90 UG/1
3 AEROSOL, METERED ORAL
Qty: 0 | Refills: 0 | DISCHARGE

## 2020-06-02 RX ORDER — QUETIAPINE FUMARATE 200 MG/1
1 TABLET, FILM COATED ORAL
Qty: 0 | Refills: 0 | DISCHARGE
Start: 2020-06-02

## 2020-06-02 RX ORDER — THIAMINE MONONITRATE (VIT B1) 100 MG
1 TABLET ORAL
Qty: 0 | Refills: 0 | DISCHARGE
Start: 2020-06-02

## 2020-06-02 RX ORDER — CLOPIDOGREL BISULFATE 75 MG/1
1 TABLET, FILM COATED ORAL
Qty: 0 | Refills: 0 | DISCHARGE
Start: 2020-06-02

## 2020-06-02 RX ORDER — GABAPENTIN 400 MG/1
1 CAPSULE ORAL
Qty: 0 | Refills: 0 | DISCHARGE

## 2020-06-02 RX ADMIN — ALBUTEROL 2 PUFF(S): 90 AEROSOL, METERED ORAL at 03:10

## 2020-06-02 RX ADMIN — PANTOPRAZOLE SODIUM 40 MILLIGRAM(S): 20 TABLET, DELAYED RELEASE ORAL at 11:20

## 2020-06-02 RX ADMIN — GABAPENTIN 400 MILLIGRAM(S): 400 CAPSULE ORAL at 13:17

## 2020-06-02 RX ADMIN — Medication 0.5 MILLIGRAM(S): at 17:02

## 2020-06-02 RX ADMIN — ENOXAPARIN SODIUM 40 MILLIGRAM(S): 100 INJECTION SUBCUTANEOUS at 17:03

## 2020-06-02 RX ADMIN — QUETIAPINE FUMARATE 100 MILLIGRAM(S): 200 TABLET, FILM COATED ORAL at 21:22

## 2020-06-02 RX ADMIN — Medication 90 MILLIGRAM(S): at 17:02

## 2020-06-02 RX ADMIN — CHLORHEXIDINE GLUCONATE 15 MILLILITER(S): 213 SOLUTION TOPICAL at 05:35

## 2020-06-02 RX ADMIN — HALOPERIDOL DECANOATE 0.5 MILLIGRAM(S): 100 INJECTION INTRAMUSCULAR at 05:35

## 2020-06-02 RX ADMIN — Medication 90 MILLIGRAM(S): at 05:35

## 2020-06-02 RX ADMIN — Medication 0.5 MILLIGRAM(S): at 23:05

## 2020-06-02 RX ADMIN — ENOXAPARIN SODIUM 40 MILLIGRAM(S): 100 INJECTION SUBCUTANEOUS at 05:35

## 2020-06-02 RX ADMIN — HALOPERIDOL DECANOATE 0.5 MILLIGRAM(S): 100 INJECTION INTRAMUSCULAR at 23:05

## 2020-06-02 RX ADMIN — ALBUTEROL 2 PUFF(S): 90 AEROSOL, METERED ORAL at 00:20

## 2020-06-02 RX ADMIN — Medication 100 MILLIGRAM(S): at 05:37

## 2020-06-02 RX ADMIN — Medication 2 MILLIGRAM(S): at 21:22

## 2020-06-02 RX ADMIN — HALOPERIDOL DECANOATE 0.5 MILLIGRAM(S): 100 INJECTION INTRAMUSCULAR at 17:02

## 2020-06-02 RX ADMIN — Medication 100 MILLIGRAM(S): at 13:17

## 2020-06-02 RX ADMIN — Medication 0.5 MILLIGRAM(S): at 11:19

## 2020-06-02 RX ADMIN — QUETIAPINE FUMARATE 50 MILLIGRAM(S): 200 TABLET, FILM COATED ORAL at 11:19

## 2020-06-02 RX ADMIN — GABAPENTIN 400 MILLIGRAM(S): 400 CAPSULE ORAL at 21:22

## 2020-06-02 RX ADMIN — Medication 90 MILLIGRAM(S): at 11:19

## 2020-06-02 RX ADMIN — GABAPENTIN 400 MILLIGRAM(S): 400 CAPSULE ORAL at 05:35

## 2020-06-02 RX ADMIN — CLOPIDOGREL BISULFATE 75 MILLIGRAM(S): 75 TABLET, FILM COATED ORAL at 11:19

## 2020-06-02 RX ADMIN — Medication 100 MILLIGRAM(S): at 11:20

## 2020-06-02 RX ADMIN — Medication 90 MILLIGRAM(S): at 23:05

## 2020-06-02 RX ADMIN — Medication 81 MILLIGRAM(S): at 11:19

## 2020-06-02 RX ADMIN — CHLORHEXIDINE GLUCONATE 15 MILLILITER(S): 213 SOLUTION TOPICAL at 17:02

## 2020-06-02 RX ADMIN — HALOPERIDOL DECANOATE 0.5 MILLIGRAM(S): 100 INJECTION INTRAMUSCULAR at 11:19

## 2020-06-02 RX ADMIN — Medication 0.5 MILLIGRAM(S): at 05:35

## 2020-06-02 RX ADMIN — Medication 100 MILLIGRAM(S): at 21:22

## 2020-06-02 NOTE — PROGRESS NOTE ADULT - SUBJECTIVE AND OBJECTIVE BOX
Subjective:    pat on vent possible placement today.    Home Medications:  acetaminophen 325 mg oral tablet: 2 tab(s) orally every 6 hours, As needed, Temp greater or equal to 38.5C (101.3F) (02 Jun 2020 12:53)  albuterol 90 mcg/inh inhalation aerosol: 2 puff(s) inhaled every 4 hours (02 Jun 2020 12:53)  ALPRAZolam 0.5 mg oral tablet: 1 tab(s) orally every 6 hours (02 Jun 2020 12:53)  aspirin 81 mg oral tablet, chewable: 1 tab(s) orally once a day (02 Jun 2020 12:53)  clopidogrel 75 mg oral tablet: 1 tab(s) orally once a day (02 Jun 2020 12:53)  dilTIAZem 90 mg oral tablet: 1 tab(s) orally every 6 hours (02 Jun 2020 12:53)  doxazosin 2 mg oral tablet: 1 tab(s) orally once a day (at bedtime) (02 Jun 2020 12:53)  gabapentin 400 mg oral capsule: 1 cap(s) orally 3 times a day (02 Jun 2020 12:53)  haloperidol 0.5 mg oral tablet: 1 tab(s) orally every 6 hours (02 Jun 2020 12:53)  pantoprazole 40 mg oral granule, enteric coated: 40 milligram(s) orally once a day (19 Apr 2020 03:12)  QUEtiapine 100 mg oral tablet: 1 tab(s) orally once a day (at bedtime) (02 Jun 2020 12:53)  QUEtiapine 50 mg oral tablet: 1 tab(s) orally once a day (02 Jun 2020 12:53)  Spiriva 18 mcg inhalation capsule: 1 cap(s) inhaled once a day (19 Apr 2020 03:12)  thiamine 100 mg oral tablet: 1 tab(s) orally once a day (02 Jun 2020 12:53)    MEDICATIONS  (STANDING):  ALBUTerol    90 MICROgram(s) HFA Inhaler 2 Puff(s) Inhalation every 4 hours  ALPRAZolam 0.5 milliGRAM(s) Oral every 6 hours  aspirin  chewable 81 milliGRAM(s) Oral daily  budesonide 160 MICROgram(s)/formoterol 4.5 MICROgram(s) Inhaler 2 Puff(s) Inhalation two times a day  ceFAZolin   IVPB 2000 milliGRAM(s) IV Intermittent every 8 hours  chlorhexidine 0.12% Liquid 15 milliLiter(s) Oral Mucosa every 12 hours  clopidogrel Tablet 75 milliGRAM(s) Oral daily  diltiazem    Tablet 90 milliGRAM(s) Oral every 6 hours  doxazosin 2 milliGRAM(s) Oral at bedtime  enoxaparin Injectable 40 milliGRAM(s) SubCutaneous every 12 hours  gabapentin 400 milliGRAM(s) Oral three times a day  haloperidol     Tablet 0.5 milliGRAM(s) Oral every 6 hours  pantoprazole  Injectable 40 milliGRAM(s) IV Push daily  QUEtiapine 50 milliGRAM(s) Oral daily  QUEtiapine 100 milliGRAM(s) Oral at bedtime  thiamine 100 milliGRAM(s) Oral daily  tiotropium 18 MICROgram(s) Capsule 1 Capsule(s) Inhalation daily    MEDICATIONS  (PRN):  acetaminophen   Tablet .. 650 milliGRAM(s) Oral every 6 hours PRN Temp greater or equal to 38.5C (101.3F)  ibuprofen  Tablet. 400 milliGRAM(s) Oral every 6 hours PRN Moderate Pain (4 - 6)      Allergies    Ceclor (Rash)  Duricef (Rash)    Intolerances        Vital Signs Last 24 Hrs  T(C): 37.2 (02 Jun 2020 09:00), Max: 37.3 (02 Jun 2020 02:00)  T(F): 99 (02 Jun 2020 09:00), Max: 99.1 (02 Jun 2020 02:00)  HR: 95 (02 Jun 2020 12:00) (71 - 113)  BP: 128/78 (02 Jun 2020 12:00) (79/66 - 140/67)  BP(mean): 90 (02 Jun 2020 12:00) (60 - 119)  RR: 29 (02 Jun 2020 12:00) (18 - 31)  SpO2: 100% (02 Jun 2020 12:00) (93% - 100%)  Mode: AC/ CMV (Assist Control/ Continuous Mandatory Ventilation)  RR (machine): 15  TV (machine): 450  FiO2: 40  PEEP: 5  ITime: 1  MAP: 18  PIP: 38      PHYSICAL EXAMINATION:    NECK:  Supple. No lymphadenopathy. Jugular venous pressure not elevated. Carotids equal.   HEART:   The cardiac impulse has a normal quality. Reg., Nl S1 and S2.  There are no murmurs, rubs or gallops noted  CHEST:  Chest is clear to auscultation. Normal respiratory effort.  ABDOMEN:  Soft and nontender.   EXTREMITIES:  There is no edema.       LABS:                        8.4    5.06  )-----------( 249      ( 01 Jun 2020 06:45 )             27.2     06-01    136  |  100  |  10  ----------------------------<  88  3.8   |  32<H>  |  0.52    Ca    9.3      01 Jun 2020 06:45  Phos  4.0     06-01  Mg     1.6     06-01

## 2020-06-02 NOTE — DISCHARGE NOTE PROVIDER - HOSPITAL COURSE
Mr. Boone is a 69 year old M w/ chronic Afib s/p Watchman device (4/2019), COPD, HTN, previous CVA, ETOH abuse, smoker, L BKA admitted to [facility] on 4/18/2020 for treatment of acute hypoxic and hypercapnic respiratory failure due to COPD exacerbation. On 4/22 Code stroke called due to AMS and episode of slurred speech. Patient evaluated by Neurology , CT Head was negative for hemmorage. Pt not TPA candidate due to episodes of hematuria. AMS thought to be due to ETOH withdrawal and started on CIWA. On 4/23, patient was intubated. Respiratory status improved with IV steroids and abx treatment and patient was extubated on 4/26. Patient then developed rapid Afib             Hospital course complicated by Rectus sheath hematoma (stable) which did not require surgical intervention. Mr. Boone is a 69 year old M w/ chronic Afib s/p Watchman device (4/2019), COPD, HTN, previous CVA, ETOH abuse, smoker, L BKA admitted to [facility] on 4/18/2020 for treatment of acute hypoxic and hypercapnic respiratory failure due to COPD exacerbation. On 4/22 Code stroke called due to AMS and episode of slurred speech. Patient evaluated by Neurology , CT Head was negative for hemmorage. Pt not TPA candidate due to episodes of hematuria. AMS thought to be due to ETOH withdrawal and started on CIWA. On 4/23, patient was intubated. Respiratory status improved with IV steroids and abx treatment and patient was extubated on 4/26. Patient then developed rapid Afib which was later controlled with increased PO medication. Hospital course complicated by Rectus sheath hematoma (stable) which did not require surgical intervention. Patient also had recurrent fevers which he received  IV antibiotics. Patient finished 14 day treatment of IV Ancef on 6/4. Patient remained intubated, trach and PEG tube placed on ___________. Mr. Boone is a 69 year old M w/ chronic Afib s/p Watchman device (4/2019), COPD, HTN, previous CVA, ETOH abuse, smoker, L BKA admitted to [facility] on 4/18/2020 for treatment of acute hypoxic and hypercapnic respiratory failure due to COPD exacerbation. On 4/22 Code stroke called due to AMS and episode of slurred speech. Patient evaluated by Neurology , CT Head was negative for hemmorage. Pt not TPA candidate due to episodes of hematuria. AMS thought to be due to ETOH withdrawal and started on CIWA. On 4/23, patient was intubated. Respiratory status improved with IV steroids and abx treatment and patient was extubated on 4/26. Patient then developed rapid Afib which was later controlled with increased PO medication. Hospital course complicated by Rectus sheath hematoma (stable) which did not require surgical intervention. Patient also had recurrent fevers which he received  IV antibiotics. Patient finished 14 day treatment of IV Ancef on 6/5. Patient remained intubated, trach and PEG tube placed on ___________. Mr. Boone is a 69 year old M w/ chronic Afib s/p Watchman device (4/2019), COPD, HTN, previous CVA, ETOH abuse, smoker, L BKA admitted to [facility] on 4/18/2020 for treatment of acute hypoxic and hypercapnic respiratory failure due to COPD exacerbation. On 4/22 Code stroke called due to AMS and episode of slurred speech. Patient evaluated by Neurology , CT Head was negative for hemmorage. Pt not TPA candidate due to episodes of hematuria. AMS thought to be due to ETOH withdrawal and started on CIWA. On 4/23, patient was intubated. Respiratory status improved with IV steroids and abx treatment and patient was extubated on 4/26. Patient then developed rapid Afib which was later controlled with increased PO medication. Hospital course complicated by Rectus sheath hematoma (stable) which did not require surgical intervention. Patient also had recurrent fevers which he received  IV antibiotics. Patient finished 14 day treatment of IV Ancef on 6/5. Patient remained intubated, trach and PEG tube placed on 5/15/2020. Trach replaced on  6/8/2020 by CT surgery. Patient with ongoing ICU delirium treated with Xanax and Seroquel. At this time he is stable for discharge to  ______. Mr. Boone is a 69 year old M w/ chronic Afib s/p Watchman device (4/2019), COPD, HTN, previous CVA, ETOH abuse, smoker, L BKA admitted to [facility] on 4/18/2020 for treatment of acute hypoxic and hypercapnic respiratory failure due to COPD exacerbation. On 4/22 Code stroke called due to AMS and episode of slurred speech. Patient evaluated by Neurology , CT Head was negative for hemmorage. Pt not TPA candidate due to episodes of hematuria. AMS thought to be due to ETOH withdrawal and started on CIWA. On 4/23, patient was intubated. Respiratory status improved with IV steroids and abx treatment and patient was extubated on 4/26. Patient then developed rapid Afib which was later controlled with increased PO medication. Hospital course complicated by Rectus sheath hematoma (stable) which did not require surgical intervention. Patient also had recurrent fevers which he received  IV antibiotics. Patient finished 14 day treatment of IV Ancef on 6/5. Patient remained intubated, trach and PEG tube placed on 5/15/2020. Trach replaced on  6/8/2020 by CT surgery. Patient with ongoing ICU delirium that was difficult to control.    Presently he is doing well with a combination of Klonopin and Seroquel.     He has a cuffless fenestrated #6 tracheostomy tube in place that is capped.    He is calm and cooperative and working with physical therapy/out of bed daily.    Hemodynamics and respiratory function are reasonable.    At this time he is stable for discharge to rehab when a bed is available.        This is patient's 3rd tracheostomy and strong consideration should be given to leaving it in place, rather than decannulating.

## 2020-06-02 NOTE — DISCHARGE NOTE PROVIDER - NSDCFUSCHEDAPPT_GEN_ALL_CORE_FT
STONE DIAZ ; 06/16/2020 ; NPP [facility] [address]  STONE DIAZ ; 06/26/2020 ; NPP [facility] [address] STONE DIAZ ; 06/26/2020 ; NPP [facility] [address] STONE Roman ; 07/14/2020 ; NPP [facility] [address] STONE DIAZ ; 07/14/2020 ; NPP [facility] [address]  STONE DIAZ ; 07/27/2020 ; NPP [facility] [address] STONE DIAZ ; 07/14/2020 ; NPP [facility] [address]  STONE DIAZ ; 07/27/2020 ; NPP [facility] [address]

## 2020-06-02 NOTE — DISCHARGE NOTE PROVIDER - REASON FOR ADMISSION
acute hypoxemic, hypercapneic resp failure  COPD exacerbation acute hypoxemic, hypercapnic resp failure  COPD exacerbation

## 2020-06-02 NOTE — PROGRESS NOTE ADULT - ASSESSMENT
66 y/o male with h/o chronic A.Fib with Watchman device, CHF, COPD, HTN, obesity was admitted on 4/18 for worsening SOB. In ER he was found with rapid A.fib and was placed on NRB. He tested COVID-19 PCR negative x 3. Noted with hypercapnea and placed on BiPAP, then intubated and placed on ventilatory support. He was extubated on 4/26. He is reported with MDRO's in sputum c/s. He was treated with azithromycin from 4/20 to 4/25.     1. Low grade fever. Sepsis with MSSA resolving. Right arm cellulitis/ phlebitis. Acute respiratory failure. 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 Allergy to PCN and cephalosporines. Possible COVID-19 syndrome.   -has low grade fever  -MYA no evidence of valvular vegetations or vegetation on Watchman device; doubt endocarditis  -encephalopathy   -cultures reviewed; repeat sputum c/s shows CRE-PSAE  -s/p cipro IV # two cycles of therapy  -s/p vancomycin 1 gm IV q12h # 10 days until 5/7  -respiratory care  -repeat BC x 2 are negative to date  -s/p vancomycin 1 gm IV q12h # 4 days  -on cefazolin 2 gm IV q8h # 12  -tolerating abx well so far; no side effects noted  -he had prior CRE-PSAE; new sputum culture shows PSAE again; CXR dose not show infiltrates  -f/u closely for rashes  -monitor closely in [facility] of duricef allergy history  -continue abx coverage with cefazolin for 3 more days  -f/u cultures  -monitor temps  -f/u CBC  -supportive care  2. Other issues:   -care per medicine

## 2020-06-02 NOTE — PROGRESS NOTE ADULT - SUBJECTIVE AND OBJECTIVE BOX
Subjective:  No acute events over night.  Agitation has improved - restraints were removed.    Vital Signs:  Vital Signs Last 24 Hrs  T(C): 37.2 (06-02-20 @ 09:00), Max: 37.3 (06-01-20 @ 11:00)  T(F): 99 (06-02-20 @ 09:00), Max: 99.1 (06-01-20 @ 11:00)  HR: 100 (06-02-20 @ 10:00) (71 - 116)  BP: 102/62 (06-02-20 @ 10:00) (79/66 - 140/67)  RR: 30 (06-02-20 @ 10:00) (18 - 31)  SpO2: 95% 50% FiO2 PEEP 5    Telemetry/Alarms: atrial fibrillation    Relevant labs, radiology and Medications reviewed                        8.4    5.06  )-----------( 249      ( 01 Jun 2020 06:45 )             27.2     06-01    136  |  100  |  10  ----------------------------<  88  3.8   |  32<H>  |  0.52    Ca    9.3      01 Jun 2020 06:45  Phos  4.0     06-01  Mg     1.6     06-01        MEDICATIONS  (STANDING):  ALBUTerol    90 MICROgram(s) HFA Inhaler 2 Puff(s) Inhalation every 4 hours  ALPRAZolam 0.5 milliGRAM(s) Oral every 6 hours  aspirin  chewable 81 milliGRAM(s) Oral daily  budesonide 160 MICROgram(s)/formoterol 4.5 MICROgram(s) Inhaler 2 Puff(s) Inhalation two times a day  ceFAZolin   IVPB 2000 milliGRAM(s) IV Intermittent every 8 hours  chlorhexidine 0.12% Liquid 15 milliLiter(s) Oral Mucosa every 12 hours  clopidogrel Tablet 75 milliGRAM(s) Oral daily  diltiazem    Tablet 90 milliGRAM(s) Oral every 6 hours  doxazosin 2 milliGRAM(s) Oral at bedtime  enoxaparin Injectable 40 milliGRAM(s) SubCutaneous every 12 hours  gabapentin 400 milliGRAM(s) Oral three times a day  haloperidol     Tablet 0.5 milliGRAM(s) Oral every 6 hours  pantoprazole  Injectable 40 milliGRAM(s) IV Push daily  QUEtiapine 50 milliGRAM(s) Oral daily  QUEtiapine 100 milliGRAM(s) Oral at bedtime  thiamine 100 milliGRAM(s) Oral daily  tiotropium 18 MICROgram(s) Capsule 1 Capsule(s) Inhalation daily    MEDICATIONS  (PRN):  acetaminophen   Tablet .. 650 milliGRAM(s) Oral every 6 hours PRN Temp greater or equal to 38.5C (101.3F)  ibuprofen  Tablet. 400 milliGRAM(s) Oral every 6 hours PRN Moderate Pain (4 - 6)      Physical exam  Gen: NAD  Neuro: AO  Card: S1 S2  Pulm: Coarse breath sounds - trach c/d/i  Abd: Soft ND, PEG c/d/i  Ext: moderate edema, left amputation      I&O's Summary    01 Jun 2020 07:01  -  02 Jun 2020 07:00  --------------------------------------------------------  IN: 1651 mL / OUT: 3475 mL / NET: -1824 mL      Assessment  65y Male  w/ PAST MEDICAL & SURGICAL HISTORY:  Chronic CHF  COPD without exacerbation  Atrial fibrillation  Presence of Watchman left atrial appendage closure device  Hypertension  GERD (gastroesophageal reflux disease)  Chronic obstructive pulmonary disease (COPD)  Anemia  Falls  Meningitis  Collapsed lung  Alcohol withdrawal  Emphysema of lung  Cirrhosis  CHF (congestive heart failure)  Poor historian  Alcohol abuse  Chronic atrial fibrillation  Unilateral amputation of lower extremity below knee  Presence of Watchman left atrial appendage closure device  S/P BKA (below knee amputation) unilateral, left   Patient is a 65y old  Male who presents with a chief complaint of acute hypoxemic, hypercapneic resp failure  COPD exacerbation (01 Jun 2020 10:04)    1.  COPD with acute exacerbation  2.  Acute on chronic respiratory failure requiring tracheostomy  3.  MRSA/pseudomonas in sputum    PLAN  Neuro: continue haldol/seroquel for agitation/confusion  Pulm: CPAP as tolerated  Cardio: Monitor telemetry/alarms  GI: Tolerating diet, TF to goal  Renal: monitor urine output, supplement electrolytes as needed  Vasc: Heparin SC/SCDs for DVT prophylaxis  Heme: Stable H/H. .   ID: Ancef    Discharge planning for placement today.  Discussed with Cardiothoracic Team at AM rounds.

## 2020-06-02 NOTE — PROGRESS NOTE ADULT - ASSESSMENT
PROBLEMS;    s/p staph bactermia/sputum pseudomonas/staph  Ac on chronic hypercapnic & hypoxamic respiratory failure-s/p trach & PEG  sputum-Few Pseudomonas aeruginosa (Carbapenem Resistant)/Moderate Methicillin resistant Staphylococcus aureus  afib with RVR  [facility]/VIJAY  AC flare of COPD/chronic vs acute on chronic bronchitis  Mild interstitial lung disease-NSIP h/o smoking  COVID negativex2  Pulmonary hypertension  Nonobstructing stone in the left ureterovesicular junction/ nonobstructing stone in the lower pole right kidney.  Subacute hemorrhage in the right rectus abdominis along the anterior sheath, measures 1.6 cm in thickness and extends from the umbilicus to the inferior myotendinous junction  Cirrhosis  Mild splenomegaly-portal venous hypertension.  Chronic afib w Watchman device  CHF  BPH  GERD  ETOH abuse  seizures disorder    PLAN;    pulmonary slow recovery- placement for rehab  vent support-pat multiple failed weaning attempts- s/p trach weaning as tolerated  iv ancef for bactermia  Tube feeding as tolerated  supportive care  covid repeat testing-neg  Eliquis/asa 81  d/w staff

## 2020-06-02 NOTE — DISCHARGE NOTE PROVIDER - NSDCCPCAREPLAN_GEN_ALL_CORE_FT
PRINCIPAL DISCHARGE DIAGNOSIS  Diagnosis: Respiratory distress  Assessment and Plan of Treatment:       SECONDARY DISCHARGE DIAGNOSES  Diagnosis: COPD exacerbation  Assessment and Plan of Treatment:     Diagnosis: Rectus sheath hematoma  Assessment and Plan of Treatment:     Diagnosis: MRSA bacteremia  Assessment and Plan of Treatment:     Diagnosis: Chronic atrial fibrillation  Assessment and Plan of Treatment: Chronic atrial fibrillation    Diagnosis: Acute on chronic congestive heart failure, unspecified heart failure type  Assessment and Plan of Treatment:

## 2020-06-02 NOTE — PROGRESS NOTE ADULT - SUBJECTIVE AND OBJECTIVE BOX
Date of service: 06-02-20 @ 08:42    Lying in bed in NAD    On ventilatory support  Has low grade fever  Tmax 99.1F  Confused    ROS: poorly verbal    MEDICATIONS  (STANDING):  ALBUTerol    90 MICROgram(s) HFA Inhaler 2 Puff(s) Inhalation every 4 hours  ALPRAZolam 0.5 milliGRAM(s) Oral every 6 hours  aspirin  chewable 81 milliGRAM(s) Oral daily  budesonide 160 MICROgram(s)/formoterol 4.5 MICROgram(s) Inhaler 2 Puff(s) Inhalation two times a day  ceFAZolin   IVPB 2000 milliGRAM(s) IV Intermittent every 8 hours  chlorhexidine 0.12% Liquid 15 milliLiter(s) Oral Mucosa every 12 hours  clopidogrel Tablet 75 milliGRAM(s) Oral daily  diltiazem    Tablet 90 milliGRAM(s) Oral every 6 hours  doxazosin 2 milliGRAM(s) Oral at bedtime  enoxaparin Injectable 40 milliGRAM(s) SubCutaneous every 12 hours  gabapentin 400 milliGRAM(s) Oral three times a day  haloperidol     Tablet 0.5 milliGRAM(s) Oral every 6 hours  pantoprazole  Injectable 40 milliGRAM(s) IV Push daily  QUEtiapine 50 milliGRAM(s) Oral daily  QUEtiapine 100 milliGRAM(s) Oral at bedtime  thiamine 100 milliGRAM(s) Oral daily  tiotropium 18 MICROgram(s) Capsule 1 Capsule(s) Inhalation daily      Vital Signs Last 24 Hrs  T(C): 37.1 (02 Jun 2020 05:00), Max: 37.3 (01 Jun 2020 11:00)  T(F): 98.8 (02 Jun 2020 05:00), Max: 99.1 (01 Jun 2020 11:00)  HR: 71 (02 Jun 2020 08:00) (71 - 116)  BP: 124/58 (02 Jun 2020 08:00) (79/66 - 140/67)  BP(mean): 75 (02 Jun 2020 08:00) (60 - 119)  RR: 20 (02 Jun 2020 08:00) (18 - 31)  SpO2: 100% (02 Jun 2020 08:00) (91% - 100%)    Mode: AC/ CMV (Assist Control/ Continuous Mandatory Ventilation), RR (machine): 15, TV (machine): 450, FiO2: 40, PEEP: 5, PS: , ITime: 1, MAP: 12, PIP: 34    Physical exam:    Constitutional:  No acute distress  HEENT: NC/AT, EOMI, PERRLA, conjunctivae clear  Neck: supple; thyroid not palpable  Back: no tenderness  Respiratory: respiratory effort normal; b/l rhonchi  Cardiovascular: S1S2 regular, no murmurs  Abdomen: soft, not tender, not distended, positive BS  Genitourinary: no suprapubic tenderness  Lymphatic: no LN palpable  Musculoskeletal: no muscle tenderness, no joint swelling or tenderness  Extremities: no pedal edema  Right arm area of erythema with phlebitis - resolving  Neurological/ Psychiatric: confused; moving all extremities  Skin: no rashes; no palpable lesions    Labs: reviewed                        8.4    5.06  )-----------( 249      ( 01 Jun 2020 06:45 )             27.2     06-01    136  |  100  |  10  ----------------------------<  88  3.8   |  32<H>  |  0.52    Ca    9.3      01 Jun 2020 06:45  Phos  4.0     06-01  Mg     1.6     06-01                        8.4    5.89  )-----------( 213      ( 25 May 2020 07:31 )             27.0     05-25    142  |  106  |  9   ----------------------------<  125<H>  3.7   |  32<H>  |  0.47<L>    Ca    8.5      25 May 2020 07:31  Phos  3.5     05-25  Mg     1.4     05-25               Vancomycin Level, Trough: 9.8 ug/mL (05-21 @ 04:52)                                  12.5   8.31  )-----------( 197      ( 07 May 2020 08:15 )             37.6     05-07    137  |  100  |  33<H>  ----------------------------<  114<H>  4.9   |  31  |  0.83    Ca    8.5      07 May 2020 08:15  Phos  4.2     05-07  Mg     2.4     05-07      COVID-19 PCR . (04.26.20 @ 00:02)    COVID-19 PCR: NotDetec      Culture - Blood (collected 26 Apr 2020 01:01)  Source: .Blood None  Preliminary Report (27 Apr 2020 10:02):    No growth to date.    Culture - Blood (collected 26 Apr 2020 00:56)  Source: .Blood None  Preliminary Report (27 Apr 2020 10:02):    No growth to date.    Culture - Sputum (collected 25 Apr 2020 01:00)  Source: .Sputum Sputum  trap  Gram Stain (26 Apr 2020 12:29):    Moderate polymorphonuclear leukocytes per low power field    No Squamous epithelial cells per low power field    Rare Gram Positive Rods per oil power field    Rare Gram Positive Cocci in Pairs and Chains per oil power field  Final Report (28 Apr 2020 10:53):    Few Pseudomonas aeruginosa (Carbapenem Resistant)    Moderate Methicillin resistant Staphylococcus aureus    Normal Respiratory Faith present  Organism: Pseudomonas aeruginosa (Carbapenem Resistant)  Methicillin resistant Staphylococcus aureus (28 Apr 2020 10:44)  Organism: Pseudomonas aeruginosa (Carbapenem Resistant) (28 Apr 2020 10:44)      -  Amikacin: S <=16      -  Aztreonam: R >16      -  Cefepime: R >16      -  Ceftazidime: R >16      -  Ciprofloxacin: S <=0.25      -  Gentamicin: S 4      -  Imipenem: R >8      -  Levofloxacin: S <=0.5      -  Meropenem: R >8      -  Piperacillin/Tazobactam: R >64      -  Tobramycin: S <=2      Method Type: LADY  Organism: Methicillin resistant Staphylococcus aureus (28 Apr 2020 10:40)      -  Amoxicillin/Clavulanic Acid: R >4/2      -  Ampicillin: R >8      -  Ampicillin/Sulbactam: R <=8/4      -  Cefazolin: R >16      -  Ceftriaxone: R >32      -  Ciprofloxacin: R >2      -  Clindamycin: R >4      -  Daptomycin: S 0.5      -  Erythromycin: R >4      -  Gentamicin: S <=1 Should not be used as monotherapy      -  Levofloxacin: R >4      -  Linezolid: S 4      -  Meropenem: R >8      -  Moxifloxacin(Aerobic): R >4      -  Oxacillin: R >2      -  Penicillin: R >8      -  RIF- Rifampin: S <=1 Should not be used as monotherapy      -  Tetra/Doxy: S 2      -  Trimethoprim/Sulfamethoxazole: S <=0.5/9.5      -  Vancomycin: S 2      Method Type: LADY    Culture - Urine (collected 21 Apr 2020 15:51)  Source: .Urine Clean Catch (Midstream)  Final Report (22 Apr 2020 17:03):    <10,000 CFU/mL Normal Urogenital Faith    Culture - Blood (collected 18 Apr 2020 19:43)  Source: .Blood Blood-Peripheral  Final Report (24 Apr 2020 01:01):    No Growth Final    Culture - Blood (05.18.20 @ 11:44)    -  Staphylococcus aureus: Detec Any isolate of Staphylococcus aureus from a blood culture is NOT considered a contaminant.    -  RIF- Rifampin: S <=1 Should not be used as monotherapy    -  Penicillin: R >8    -  Oxacillin: S <=0.25    -  Tetra/Doxy: S <=1    -  Trimethoprim/Sulfamethoxazole: S <=0.5/9.5    -  Vancomycin: S 2    -  Clindamycin: R 0.5 This isolate is presumed to be clindamycin resistant based on detection of inducible resistance. Clindamycin may still be effective in some patients.    -  Gentamicin: S <=1 Should not be used as monotherapy    -  Erythromycin: R >4    -  Ampicillin/Sulbactam: S <=8/4    -  Cefazolin: S <=4    Gram Stain:   Growth in aerobic bottle: Gram Positive Cocci in Clusters  Growth in anaerobic bottle: Gram Positive Cocci in Clusters    Specimen Source: .Blood None    Organism: Staphylococcus aureus    Organism: Staphylococcus aureus    Culture Results:   Growth in aerobic and anaerobic bottles: Staphylococcus aureus    Organism Identification: Staphylococcus aureus  Staphylococcus aureus    Method Type: LADY    Method Type: PCR        Culture - Sputum . (05.18.20 @ 09:45)    -  Tobramycin: S <=2    -  Piperacillin/Tazobactam: R >64    -  Meropenem: R >8    -  Levofloxacin: S <=0.5    Gram Stain:   Numerous polymorphonuclear leukocytes per low power field  No Squamous epithelial cells per low power field  Numerous Gram Negative Rods per oil power field    -  Amikacin: S <=16    -  Cefepime: R >16    -  Aztreonam: R >16    -  Imipenem: R >8    -  Gentamicin: S 4    -  Ciprofloxacin: S <=0.25    -  Ceftazidime: R >16    Specimen Source: .Sputum Sputums trap    Culture Results:   Numerous Pseudomonas aeruginosa (Carbapenem Resistant)  Normal Respiratory Faith absent    Organism Identification: Pseudomonas aeruginosa (Carbapenem Resistant)    Organism: Pseudomonas aeruginosa (Carbapenem Resistant)    Method Type: LADY    Culture - Blood in AM (05.20.20 @ 06:41)    Gram Stain:   Growth in aerobic bottle: Gram Positive Cocci in Clusters  Growth in anaerobic bottle: Gram Positive Cocci in Clusters    Specimen Source: .Blood None    Culture Results:   Growth in aerobic and anaerobic bottles: Staphylococcus aureus  See previous culture [id number]    Culture - Blood (05.23.20 @ 07:07)    Specimen Source: .Blood None    Culture Results:   No growth to date.    Culture - Blood in AM (05.29.20 @ 06:49)    Specimen Source: .Blood None    Culture Results:   No growth to date.    COVID-19 PCR . (05.14.20 @ 08:10)    COVID-19 PCR: NotDetec    Radiology: all available radiological tests reviewed    < from: Xray Chest 1 View- PORTABLE-Urgent (05.18.20 @ 09:49) >  A tracheostomy tube is noted.  Lungs: There are no lung consolidations.  Heart: There is cardiomegaly.  Mediastinum: The mediastinum is within normal limits.    < end of copied text >    < from: MYA Complete w/Spect and Color (05.26.20 @ 13:17) >   Mitral Valve   Structurally normalmitral valve. Moderate mitral regurgitation, no   stenosis. No evidence of vegetations.     Aortic Valve   Structurally normal trileaflet aortic valve. No aortic regurgitation or   stenosis. No evidence of vegetations.     Tricuspid Valve   Structurally normal tricuspid valve. Moderate tricuspid regurgitation, no   stenosis. No evidence of vegetations.     Pulmonic Valve   Structurally normal pulmonic valve. Mild pulmonic regurgitation, no   stenosis. No evidence of vegetations.     Left Atrium  Moderate left atrial dilation.   Left atrial appendage occlusion device ("Watchman device") well seated in   left atrial appendage. No evidence of vegetation on this device.    < end of copied text >      Advanced directives addressed: full resuscitation

## 2020-06-02 NOTE — DISCHARGE NOTE PROVIDER - CARE PROVIDER_API CALL
Augusta Hernandez  F[facility]ILY MEDICINE  [address] [place] [address]  Phone: [phone]  Fax: [phone]  Follow Up Time: Routine

## 2020-06-02 NOTE — PROGRESS NOTE ADULT - ASSESSMENT
IMP:    66 y/o male w chronic AFib w Watchman device, HFpEF , COPD on home O2, HTN, ETOH use and pt of size admitted on 4/18 with COPD exacerbation--RRT called on 4/23 for AMS requiring intubation--extubated on 4/26 then re intubated on 4/29.  S/P Trach and PEG on 5/15 and has failed SBT.  Most recent active issue is persistent MSSA sepsis and CRP in sputum--Remains on IV Ancef and completed Cipro.  No central access   Chronic resp failure  TM Encephalopathy and agitation     Plan:    SBT as tolerated  IV vanco--stopped on 5/22.  Ancef (12) and completed Cipro.  Repeat BCx 5/29 negative   Increased Xanax to 0.5 q6  Cont with Haldol, Seroquel and Oxy (QTc < 450)  Cont clopidogrel and ASA for CAD and AFib--Dilt increased to 90 q6   TF to goal  HOB > 30  Started cardura   DVT prophy--SC and LMWH    ICU care--d/w ICU staff on multi disciplinary rounds.  Stable for placement today

## 2020-06-02 NOTE — DISCHARGE NOTE PROVIDER - NSDCMRMEDTOKEN_GEN_ALL_CORE_FT
acetaminophen 325 mg oral tablet: 2 tab(s) orally every 6 hours, As needed, Temp greater or equal to 38.5C (101.3F)  acetaZOLAMIDE 125 mg oral tablet: 1 tab(s) orally once a day  albuterol 90 mcg/inh inhalation aerosol: 2 puff(s) inhaled every 4 hours  ALPRAZolam 0.5 mg oral tablet: 1 tab(s) orally every 6 hours  aspirin 81 mg oral tablet, chewable: 1 tab(s) orally once a day  clopidogrel 75 mg oral tablet: 1 tab(s) orally once a day  dilTIAZem 90 mg oral tablet: 1 tab(s) orally every 6 hours  doxazosin 2 mg oral tablet: 1 tab(s) orally once a day (at bedtime)  ferrous sulfate 325 mg (65 mg elemental iron) oral tablet: 1 tab(s) orally once a day -  furosemide 40 mg oral tablet: 1 tab(s) orally 2 times a day  gabapentin 400 mg oral capsule: 1 cap(s) orally 3 times a day  haloperidol 0.5 mg oral tablet: 1 tab(s) orally every 6 hours  pantoprazole 40 mg oral granule, enteric coated: 40 milligram(s) orally once a day  QUEtiapine 100 mg oral tablet: 1 tab(s) orally once a day (at bedtime)  QUEtiapine 50 mg oral tablet: 1 tab(s) orally once a day  Spiriva 18 mcg inhalation capsule: 1 cap(s) inhaled once a day  thiamine 100 mg oral tablet: 1 tab(s) orally once a day acetaminophen 325 mg oral tablet: 2 tab(s) orally every 6 hours, As needed, Temp greater or equal to 38.5C (101.3F)  acetaZOLAMIDE 125 mg oral tablet: 1 tab(s) orally once a day  albuterol 90 mcg/inh inhalation aerosol: 2 puff(s) inhaled every 4 hours  ALPRAZolam 0.5 mg oral tablet: 1 tab(s) orally every 6 hours  aspirin 81 mg oral tablet, chewable: 1 tab(s) orally once a day  clopidogrel 75 mg oral tablet: 1 tab(s) orally once a day  dilTIAZem 90 mg oral tablet: 1 tab(s) orally every 6 hours  doxazosin 2 mg oral tablet: 1 tab(s) orally once a day (at bedtime)  ferrous sulfate 325 mg (65 mg elemental iron) oral tablet: 1 tab(s) orally once a day -  furosemide 40 mg oral tablet: 1 tab(s) orally 2 times a day  gabapentin 400 mg oral capsule: 1 cap(s) orally 3 times a day  pantoprazole 40 mg oral granule, enteric coated: 40 milligram(s) orally once a day  QUEtiapine 100 mg oral tablet: 1 tab(s) orally once a day (at bedtime)  QUEtiapine 50 mg oral tablet: 1 tab(s) orally once a day  Spiriva 18 mcg inhalation capsule: 1 cap(s) inhaled once a day  thiamine 100 mg oral tablet: 1 tab(s) orally once a day acetaminophen 325 mg oral tablet: 2 tab(s) orally every 6 hours, As needed, Temp greater or equal to 38.5C (101.3F)  albuterol 90 mcg/inh inhalation aerosol: 2 puff(s) inhaled every 4 hours  ALPRAZolam 0.5 mg oral tablet: 1 tab(s) orally every 6 hours  aspirin 81 mg oral tablet, chewable: 1 tab(s) orally once a day  budesonide-formoterol 160 mcg-4.5 mcg/inh inhalation aerosol: 2 puff(s) inhaled 2 times a day  clopidogrel 75 mg oral tablet: 1 tab(s) orally once a day  dilTIAZem 90 mg oral tablet: 1 tab(s) orally every 6 hours  doxazosin 2 mg oral tablet: 1 tab(s) orally once a day (at bedtime)  famotidine 20 mg oral tablet: 1 tab(s) orally 2 times a day  gabapentin 400 mg oral capsule: 1 cap(s) orally 3 times a day  metoprolol tartrate 25 mg oral tablet: 1 tab(s) orally 2 times a day  nicotine 14 mg/24 hr transdermal film, extended release: 14 milligram(s) transdermal once a day  pantoprazole 40 mg oral granule, enteric coated: 40 milligram(s) orally once a day  polyethylene glycol 3350 oral powder for reconstitution: 17 gram(s) orally once a day  QUEtiapine 100 mg oral tablet: 1 tab(s) orally once a day (at bedtime)  QUEtiapine 50 mg oral tablet: 1 tab(s) orally once a day  senna oral tablet: 2 tab(s) orally once a day (at bedtime)  Spiriva 18 mcg inhalation capsule: 1 cap(s) inhaled once a day  thiamine 100 mg oral tablet: 1 tab(s) orally once a day

## 2020-06-02 NOTE — PROGRESS NOTE ADULT - SUBJECTIVE AND OBJECTIVE BOX
[id number]  ICU # 39  Vent # 22 and Trach and PEG # 17    CC:  Respiratory Failure     HPI:    66 y/o male w chronic AFib w Watchman device, HFpEF , COPD on home O2, HTN, ETOH use and pt of size admitted on 4/18 with COPD exacerbation--RRT called on 4/23 for AMS requiring intubation--extubated on 4/26 then re intubated on 4/29.  S/P Trach and PEG on 5/15 and has failed SBT.  Most recent active issues is persistent MSSA sepsis and CRP in sputum--on IV vanco and Cipro.  No central access      5/22:  Pt seen and examined in ICU--vented-- Encephalopathy.  still MSSA + in Bcx  5/23:  Tolerated PSV 12 x 3 hrs on 5/22.  SBT ongoing now.  Poor mentation.  Repeat BCx sent.  Abx changed to Ancef  5/24:  PSV X 4.5 hrs on 5/23.  Rapid AFib O/N.  Still Encephalopathy.  COVID sent.  5/23 BCx still P.     6/1:  Case d/w Dr horne.  Pt seen and examined in ICU--T+P--attempts to follow commands but overall Encephalopathic.  SBT ongoing.  Tm 99.6  6/2:  No restraints overnight.  Calm.  + BM yesterday.  On vent support.  Stable vitals.  No fever.  Stable for placement     PMH:  As above.     PSH:  As above.     FH: Non Contributory other than those listed in HPI    Social History:  Unobtainable due to clinical condition     MEDICATIONS  (STANDING):  ALBUTerol    90 MICROgram(s) HFA Inhaler 2 Puff(s) Inhalation every 4 hours  ALPRAZolam 0.5 milliGRAM(s) Oral every 6 hours  aspirin  chewable 81 milliGRAM(s) Oral daily  budesonide 160 MICROgram(s)/formoterol 4.5 MICROgram(s) Inhaler 2 Puff(s) Inhalation two times a day  ceFAZolin   IVPB 2000 milliGRAM(s) IV Intermittent every 8 hours  chlorhexidine 0.12% Liquid 15 milliLiter(s) Oral Mucosa every 12 hours  clopidogrel Tablet 75 milliGRAM(s) Oral daily  diltiazem    Tablet 90 milliGRAM(s) Oral every 6 hours  doxazosin 2 milliGRAM(s) Oral at bedtime  enoxaparin Injectable 40 milliGRAM(s) SubCutaneous every 12 hours  gabapentin 400 milliGRAM(s) Oral three times a day  haloperidol     Tablet 0.5 milliGRAM(s) Oral every 6 hours  pantoprazole  Injectable 40 milliGRAM(s) IV Push daily  QUEtiapine 50 milliGRAM(s) Oral daily  QUEtiapine 100 milliGRAM(s) Oral at bedtime  thiamine 100 milliGRAM(s) Oral daily  tiotropium 18 MICROgram(s) Capsule 1 Capsule(s) Inhalation daily    MEDICATIONS  (PRN):  acetaminophen   Tablet .. 650 milliGRAM(s) Oral every 6 hours PRN Temp greater or equal to 38.5C (101.3F)  ibuprofen  Tablet. 400 milliGRAM(s) Oral every 6 hours PRN Moderate Pain (4 - 6)      Allergies: NKDA    ROS:  SEE BELOW        ICU Vital Signs Last 24 Hrs  T(C): 37.1 (02 Jun 2020 05:00), Max: 37.3 (01 Jun 2020 11:00)  T(F): 98.8 (02 Jun 2020 05:00), Max: 99.1 (01 Jun 2020 11:00)  HR: 90 (02 Jun 2020 09:00) (71 - 116)  BP: 124/58 (02 Jun 2020 08:00) (79/66 - 140/67)  BP(mean): 75 (02 Jun 2020 08:00) (60 - 119)  ABP: --  ABP(mean): --  RR: 20 (02 Jun 2020 08:00) (18 - 31)  SpO2: 100% (02 Jun 2020 09:00) (91% - 100%)      Mode: AC/ CMV (Assist Control/ Continuous Mandatory Ventilation)  RR (machine): 15  TV (machine): 450  FiO2: 40  PEEP: 5  PS:   ITime: 1  MAP: 14  PIP: 36      I&O's Summary    01 Jun 2020 07:01  -  02 Jun 2020 07:00  --------------------------------------------------------  IN: 1651 mL / OUT: 3475 mL / NET: -1824 mL        Physical Exam:  SEE BELOW                          8.4    5.06  )-----------( 249      ( 01 Jun 2020 06:45 )             27.2       06-01    136  |  100  |  10  ----------------------------<  88  3.8   |  32<H>  |  0.52    Ca    9.3      01 Jun 2020 06:45  Phos  4.0     06-01  Mg     1.6     06-01                      DVT Prophylaxis:                                                            Contraindication:     Advanced Directives:    Discussed with:    Visit Information:  Time spent excluding procedure:      ** Time is exclusive of billed procedures and/or teaching and/or routine family updates.

## 2020-06-02 NOTE — PROGRESS NOTE ADULT - ENMT COMMENTS
Trach in situ connected to Vent

## 2020-06-03 PROCEDURE — 99232 SBSQ HOSP IP/OBS MODERATE 35: CPT

## 2020-06-03 RX ORDER — HALOPERIDOL DECANOATE 100 MG/ML
0.5 INJECTION INTRAMUSCULAR
Refills: 0 | Status: DISCONTINUED | OUTPATIENT
Start: 2020-06-03 | End: 2020-06-04

## 2020-06-03 RX ADMIN — ALBUTEROL 2 PUFF(S): 90 AEROSOL, METERED ORAL at 01:01

## 2020-06-03 RX ADMIN — Medication 81 MILLIGRAM(S): at 12:59

## 2020-06-03 RX ADMIN — QUETIAPINE FUMARATE 100 MILLIGRAM(S): 200 TABLET, FILM COATED ORAL at 21:23

## 2020-06-03 RX ADMIN — Medication 2 MILLIGRAM(S): at 21:23

## 2020-06-03 RX ADMIN — Medication 0.5 MILLIGRAM(S): at 12:58

## 2020-06-03 RX ADMIN — Medication 90 MILLIGRAM(S): at 13:00

## 2020-06-03 RX ADMIN — ENOXAPARIN SODIUM 40 MILLIGRAM(S): 100 INJECTION SUBCUTANEOUS at 05:04

## 2020-06-03 RX ADMIN — ENOXAPARIN SODIUM 40 MILLIGRAM(S): 100 INJECTION SUBCUTANEOUS at 17:35

## 2020-06-03 RX ADMIN — CLOPIDOGREL BISULFATE 75 MILLIGRAM(S): 75 TABLET, FILM COATED ORAL at 12:59

## 2020-06-03 RX ADMIN — Medication 100 MILLIGRAM(S): at 05:03

## 2020-06-03 RX ADMIN — Medication 100 MILLIGRAM(S): at 13:57

## 2020-06-03 RX ADMIN — Medication 90 MILLIGRAM(S): at 23:48

## 2020-06-03 RX ADMIN — Medication 0.5 MILLIGRAM(S): at 17:35

## 2020-06-03 RX ADMIN — HALOPERIDOL DECANOATE 0.5 MILLIGRAM(S): 100 INJECTION INTRAMUSCULAR at 12:59

## 2020-06-03 RX ADMIN — Medication 90 MILLIGRAM(S): at 05:03

## 2020-06-03 RX ADMIN — Medication 90 MILLIGRAM(S): at 17:35

## 2020-06-03 RX ADMIN — Medication 0.5 MILLIGRAM(S): at 05:03

## 2020-06-03 RX ADMIN — Medication 100 MILLIGRAM(S): at 21:23

## 2020-06-03 RX ADMIN — Medication 100 MILLIGRAM(S): at 13:58

## 2020-06-03 RX ADMIN — CHLORHEXIDINE GLUCONATE 15 MILLILITER(S): 213 SOLUTION TOPICAL at 17:35

## 2020-06-03 RX ADMIN — QUETIAPINE FUMARATE 50 MILLIGRAM(S): 200 TABLET, FILM COATED ORAL at 13:00

## 2020-06-03 RX ADMIN — GABAPENTIN 400 MILLIGRAM(S): 400 CAPSULE ORAL at 13:02

## 2020-06-03 RX ADMIN — GABAPENTIN 400 MILLIGRAM(S): 400 CAPSULE ORAL at 05:03

## 2020-06-03 RX ADMIN — PANTOPRAZOLE SODIUM 40 MILLIGRAM(S): 20 TABLET, DELAYED RELEASE ORAL at 13:00

## 2020-06-03 RX ADMIN — HALOPERIDOL DECANOATE 0.5 MILLIGRAM(S): 100 INJECTION INTRAMUSCULAR at 05:03

## 2020-06-03 RX ADMIN — Medication 0.5 MILLIGRAM(S): at 23:48

## 2020-06-03 RX ADMIN — GABAPENTIN 400 MILLIGRAM(S): 400 CAPSULE ORAL at 21:23

## 2020-06-03 RX ADMIN — CHLORHEXIDINE GLUCONATE 15 MILLILITER(S): 213 SOLUTION TOPICAL at 05:04

## 2020-06-03 NOTE — PROVIDER CONTACT NOTE (CHANGE IN STATUS NOTIFICATION) - SITUATION
Pt. is now following commands, not reaching for his trach or PEG and has been restraint free since 6/1/20 at 2030pm.  Pt. shook the Doctors hand this morning and mouthing words to staff to communicate needs.

## 2020-06-03 NOTE — PROGRESS NOTE ADULT - SUBJECTIVE AND OBJECTIVE BOX
Subjective:  No acute events overnight.  RN called regarding irregularity at trach site.    Vital Signs:  Vital Signs Last 24 Hrs  T(C): 37.3 (06-03-20 @ 11:00), Max: 37.4 (06-03-20 @ 04:00)  T(F): 99.2 (06-03-20 @ 11:00), Max: 99.4 (06-03-20 @ 04:00)  HR: 89 (06-03-20 @ 10:00) (69 - 113)  BP: 123/74 (06-03-20 @ 10:00) (102/65 - 135/62)  RR: 29 (06-03-20 @ 10:00) (18 - 30)  SpO2: 99% (06-03-20 @ 10:00) (97% - 100%) on 40% FiO2 PEEP 5    Telemetry/Alarms: atrial fibrillation    Relevant labs, radiology and Medications reviewed            MEDICATIONS  (STANDING):  ALBUTerol    90 MICROgram(s) HFA Inhaler 2 Puff(s) Inhalation every 4 hours  ALPRAZolam 0.5 milliGRAM(s) Oral every 6 hours  aspirin  chewable 81 milliGRAM(s) Oral daily  budesonide 160 MICROgram(s)/formoterol 4.5 MICROgram(s) Inhaler 2 Puff(s) Inhalation two times a day  ceFAZolin   IVPB 2000 milliGRAM(s) IV Intermittent every 8 hours  chlorhexidine 0.12% Liquid 15 milliLiter(s) Oral Mucosa every 12 hours  clopidogrel Tablet 75 milliGRAM(s) Oral daily  diltiazem    Tablet 90 milliGRAM(s) Oral every 6 hours  doxazosin 2 milliGRAM(s) Oral at bedtime  enoxaparin Injectable 40 milliGRAM(s) SubCutaneous every 12 hours  gabapentin 400 milliGRAM(s) Oral three times a day  haloperidol     Tablet 0.5 milliGRAM(s) Oral every 6 hours  pantoprazole  Injectable 40 milliGRAM(s) IV Push daily  QUEtiapine 50 milliGRAM(s) Oral daily  QUEtiapine 100 milliGRAM(s) Oral at bedtime  thiamine 100 milliGRAM(s) Oral daily  tiotropium 18 MICROgram(s) Capsule 1 Capsule(s) Inhalation daily    MEDICATIONS  (PRN):  acetaminophen   Tablet .. 650 milliGRAM(s) Oral every 6 hours PRN Temp greater or equal to 38.5C (101.3F)  ibuprofen  Tablet. 400 milliGRAM(s) Oral every 6 hours PRN Moderate Pain (4 - 6)      Physical exam  Gen: NAD  Neuro: AO following commands  Card: S1 S2  Pulm: Merritt has granulation tissue at 7 o'clock, no active bleeding  Abd: Soft NT ND  Ext: no edema    Silver nitrate was used to cauterize granulation tissue next to trach - patient tolerated it well.  No active bleeding.  2 Sutures were removed.    I&O's Summary    02 Jun 2020 07:01  -  03 Jun 2020 07:00  --------------------------------------------------------  IN: 3342 mL / OUT: 2200 mL / NET: 1142 mL        Assessment  65y Male  w/ PAST MEDICAL & SURGICAL HISTORY:  Chronic CHF  COPD without exacerbation  Atrial fibrillation  Presence of Watchman left atrial appendage closure device  Hypertension  GERD (gastroesophageal reflux disease)  Chronic obstructive pulmonary disease (COPD)  Anemia  Falls  Meningitis  Collapsed lung  Alcohol withdrawal  Emphysema of lung  Cirrhosis  CHF (congestive heart failure)  Poor historian  Alcohol abuse  Chronic atrial fibrillation  Unilateral amputation of lower extremity below knee  Presence of Watchman left atrial appendage closure device  S/P BKA (below knee amputation) unilateral, left  Patient is a 65y old  Male who presents with a chief complaint of acute hypoxemic, hypercapneic resp failure  COPD exacerbation (03 Jun 2020 09:16)  .  1.  COPD with acute exacerbation  2.  Acute on chronic respiratory failure requiring tracheostomy  3.  MRSA/pseudomonas in sputum    PLAN  Neuro: continue haldol/seroquel for agitation/confusion  Pulm: CPAP as tolerated  Cardio: Monitor telemetry/alarms  GI: Tolerating diet, TF to goal  Renal: monitor urine output, supplement electrolytes as needed  Vasc: Heparin SC/SCDs for DVT prophylaxis  Heme: Stable H/H.   ID: Ancef    Discharge planning for placement today.  Discussed with Cardiothoracic Team at AM rounds.

## 2020-06-03 NOTE — PROGRESS NOTE ADULT - ASSESSMENT
IMP:    66 y/o male w chronic AFib w Watchman device, HFpEF , COPD on home O2, HTN, ETOH use and pt of size admitted on 4/18 with COPD exacerbation--RRT called on 4/23 for AMS requiring intubation--extubated on 4/26 then re intubated on 4/29.  S/P Trach and PEG on 5/15 and has failed SBT.  Most recent active issue is persistent MSSA sepsis and CRP in sputum--Remains on IV Ancef and completed Cipro.  No central access   Chronic resp failure  TM Encephalopathy and agitation     Plan:    SBT as tolerated  IV vanco--stopped on 5/22.  Ancef (13) and completed Cipro.  Repeat BCx 5/29 negative   Increased Xanax to 0.5 q6  Cont with Haldol, Seroquel and Oxy (QTc < 450)  Cont clopidogrel and ASA for CAD and AFib--Dilt increased to 90 q6   TF to goal  HOB > 30  Started cardura   DVT prophy--SC and LMWH    ICU care--d/w ICU staff on multi disciplinary rounds.  Stable for placement today

## 2020-06-03 NOTE — PROGRESS NOTE ADULT - SUBJECTIVE AND OBJECTIVE BOX
[facility]  ICU # 40  Vent # 22 and Trach and PEG # 18    CC:  Respiratory Failure     HPI:    64 y/o male w chronic AFib w Watchman device, HFpEF , COPD on home O2, HTN, ETOH use and pt of size admitted on 4/18 with COPD exacerbation--RRT called on 4/23 for AMS requiring intubation--extubated on 4/26 then re intubated on 4/29.  S/P Trach and PEG on 5/15 and has failed SBT.  Most recent active issues is persistent MSSA sepsis and CRP in sputum--on IV vanco and Cipro.  No central access      5/22:  Pt seen and examined in ICU--vented-- Encephalopathy.  still MSSA + in Bcx  5/23:  Tolerated PSV 12 x 3 hrs on 5/22.  SBT ongoing now.  Poor mentation.  Repeat BCx sent.  Abx changed to Ancef  5/24:  PSV X 4.5 hrs on 5/23.  Rapid AFib O/N.  Still Encephalopathy.  COVID sent.  5/23 BCx still P.     6/1:  Case d/w Dr horne.  Pt seen and examined in ICU--T+P--attempts to follow commands but overall Encephalopathic.  SBT ongoing.  Tm 99.6  6/2:  No restraints overnight.  Calm.  + BM yesterday.  On vent support.  Stable vitals.  No fever.  Stable for placement   6/3:  No restraints.  Slightly more awake and alert.  On Vent support.  Stable for placement.  Stable vitals and no fevers    PMH:  As above.     PSH:  As above.     FH: Non Contributory other than those listed in HPI    Social History:  Unobtainable due to clinical condition     MEDICATIONS  (STANDING):  ALBUTerol    90 MICROgram(s) HFA Inhaler 2 Puff(s) Inhalation every 4 hours  ALPRAZolam 0.5 milliGRAM(s) Oral every 6 hours  aspirin  chewable 81 milliGRAM(s) Oral daily  budesonide 160 MICROgram(s)/formoterol 4.5 MICROgram(s) Inhaler 2 Puff(s) Inhalation two times a day  ceFAZolin   IVPB 2000 milliGRAM(s) IV Intermittent every 8 hours  chlorhexidine 0.12% Liquid 15 milliLiter(s) Oral Mucosa every 12 hours  clopidogrel Tablet 75 milliGRAM(s) Oral daily  diltiazem    Tablet 90 milliGRAM(s) Oral every 6 hours  doxazosin 2 milliGRAM(s) Oral at bedtime  enoxaparin Injectable 40 milliGRAM(s) SubCutaneous every 12 hours  gabapentin 400 milliGRAM(s) Oral three times a day  haloperidol     Tablet 0.5 milliGRAM(s) Oral every 6 hours  pantoprazole  Injectable 40 milliGRAM(s) IV Push daily  QUEtiapine 50 milliGRAM(s) Oral daily  QUEtiapine 100 milliGRAM(s) Oral at bedtime  thiamine 100 milliGRAM(s) Oral daily  tiotropium 18 MICROgram(s) Capsule 1 Capsule(s) Inhalation daily    MEDICATIONS  (PRN):  acetaminophen   Tablet .. 650 milliGRAM(s) Oral every 6 hours PRN Temp greater or equal to 38.5C (101.3F)  ibuprofen  Tablet. 400 milliGRAM(s) Oral every 6 hours PRN Moderate Pain (4 - 6)      Allergies: NKDA    ROS:  SEE BELOW        ICU Vital Signs Last 24 Hrs  T(C): 37.4 (03 Jun 2020 04:00), Max: 37.4 (03 Jun 2020 04:00)  T(F): 99.4 (03 Jun 2020 04:00), Max: 99.4 (03 Jun 2020 04:00)  HR: 88 (03 Jun 2020 08:00) (69 - 113)  BP: 124/61 (03 Jun 2020 08:00) (102/62 - 135/62)  BP(mean): 74 (03 Jun 2020 08:00) (64 - 96)  ABP: --  ABP(mean): --  RR: 22 (03 Jun 2020 08:00) (18 - 30)  SpO2: 100% (03 Jun 2020 08:00) (93% - 100%)      Mode: AC/ CMV (Assist Control/ Continuous Mandatory Ventilation)  RR (machine): 15  TV (machine): 450  FiO2: 40  PEEP: 5  ITime: 1  MAP: 15  PIP: 37      I&O's Summary    02 Jun 2020 07:01  -  03 Jun 2020 07:00  --------------------------------------------------------  IN: 3342 mL / OUT: 2200 mL / NET: 1142 mL        Physical Exam:  SEE BELOW                              DVT Prophylaxis:                                                            Contraindication:     Advanced Directives:    Discussed with:    Visit Information:  Time spent excluding procedure:      ** Time is exclusive of billed procedures and/or teaching and/or routine family updates.

## 2020-06-04 PROCEDURE — 99232 SBSQ HOSP IP/OBS MODERATE 35: CPT

## 2020-06-04 RX ADMIN — ENOXAPARIN SODIUM 40 MILLIGRAM(S): 100 INJECTION SUBCUTANEOUS at 17:29

## 2020-06-04 RX ADMIN — Medication 90 MILLIGRAM(S): at 12:56

## 2020-06-04 RX ADMIN — Medication 2 MILLIGRAM(S): at 21:00

## 2020-06-04 RX ADMIN — Medication 90 MILLIGRAM(S): at 05:09

## 2020-06-04 RX ADMIN — GABAPENTIN 400 MILLIGRAM(S): 400 CAPSULE ORAL at 21:00

## 2020-06-04 RX ADMIN — CHLORHEXIDINE GLUCONATE 15 MILLILITER(S): 213 SOLUTION TOPICAL at 05:09

## 2020-06-04 RX ADMIN — Medication 100 MILLIGRAM(S): at 15:26

## 2020-06-04 RX ADMIN — GABAPENTIN 400 MILLIGRAM(S): 400 CAPSULE ORAL at 05:09

## 2020-06-04 RX ADMIN — QUETIAPINE FUMARATE 50 MILLIGRAM(S): 200 TABLET, FILM COATED ORAL at 12:56

## 2020-06-04 RX ADMIN — Medication 100 MILLIGRAM(S): at 12:56

## 2020-06-04 RX ADMIN — CLOPIDOGREL BISULFATE 75 MILLIGRAM(S): 75 TABLET, FILM COATED ORAL at 12:56

## 2020-06-04 RX ADMIN — Medication 400 MILLIGRAM(S): at 12:56

## 2020-06-04 RX ADMIN — ENOXAPARIN SODIUM 40 MILLIGRAM(S): 100 INJECTION SUBCUTANEOUS at 05:08

## 2020-06-04 RX ADMIN — Medication 90 MILLIGRAM(S): at 17:29

## 2020-06-04 RX ADMIN — PANTOPRAZOLE SODIUM 40 MILLIGRAM(S): 20 TABLET, DELAYED RELEASE ORAL at 12:54

## 2020-06-04 RX ADMIN — Medication 100 MILLIGRAM(S): at 21:00

## 2020-06-04 RX ADMIN — CHLORHEXIDINE GLUCONATE 15 MILLILITER(S): 213 SOLUTION TOPICAL at 17:30

## 2020-06-04 RX ADMIN — Medication 90 MILLIGRAM(S): at 23:55

## 2020-06-04 RX ADMIN — QUETIAPINE FUMARATE 100 MILLIGRAM(S): 200 TABLET, FILM COATED ORAL at 21:02

## 2020-06-04 RX ADMIN — Medication 81 MILLIGRAM(S): at 12:56

## 2020-06-04 RX ADMIN — GABAPENTIN 400 MILLIGRAM(S): 400 CAPSULE ORAL at 15:23

## 2020-06-04 RX ADMIN — Medication 0.5 MILLIGRAM(S): at 21:03

## 2020-06-04 RX ADMIN — Medication 0.5 MILLIGRAM(S): at 05:09

## 2020-06-04 RX ADMIN — Medication 100 MILLIGRAM(S): at 05:09

## 2020-06-04 NOTE — PROGRESS NOTE ADULT - ASSESSMENT
IMP:    64 y/o male w chronic AFib w Watchman device, HFpEF , COPD on home O2, HTN, ETOH use and pt of size admitted on 4/18 with COPD exacerbation--RRT called on 4/23 for AMS requiring intubation--extubated on 4/26 then re intubated on 4/29.  S/P Trach and PEG on 5/15 and has failed SBT.  Most recent active issue is persistent MSSA sepsis and CRP in sputum--Remains on IV Ancef and completed Cipro.  No central access   Chronic resp failure  TM Encephalopathy and agitation     Plan:    SBT as tolerated  IV vanco--stopped on 5/22.  Ancef (14) and completed Cipro.  Repeat BCx 5/29 negative   Xanax to 0.5 q 6  DC haldol   Seroquel and Oxy (QTc < 450)  Cont clopidogrel and ASA for CAD and AFib--Dilt increased to 90 q6   TF to goal  HOB > 30  Started cardura   DVT prophy--SC and LMWH    ICU care--d/w ICU staff on multi disciplinary rounds.  Stable for placement today

## 2020-06-04 NOTE — PROGRESS NOTE ADULT - ASSESSMENT
PROBLEMS;    s/p staph bactermia/sputum pseudomonas/staph  Ac on chronic hypercapnic & hypoxamic respiratory failure-s/p trach & PEG  sputum-Few Pseudomonas aeruginosa (Carbapenem Resistant)/Moderate Methicillin resistant Staphylococcus aureus  afib with RVR  OHS/VIJAY  AC flare of COPD/chronic vs acute on chronic bronchitis  Mild interstitial lung disease-NSIP h/o smoking  COVID negativex2  Pulmonary hypertension  Nonobstructing stone in the left ureterovesicular junction/ nonobstructing stone in the lower pole right kidney.  Subacute hemorrhage in the right rectus abdominis along the anterior sheath, measures 1.6 cm in thickness and extends from the umbilicus to the inferior myotendinous junction  Cirrhosis  Mild splenomegaly-portal venous hypertension.  Chronic afib w Watchman device  CHF  BPH  GERD  ETOH abuse  seizures disorder    PLAN;    pulmonary slow recovery- waiting placement for rehab  vent support-pat multiple failed weaning attempts- s/p trach weaning as tolerated  iv ancef for bactermia  Tube feeding as tolerated  supportive care  covid repeat testing-neg  Eliquis/asa 81  d/w staff

## 2020-06-04 NOTE — PROGRESS NOTE ADULT - SUBJECTIVE AND OBJECTIVE BOX
Subjective:  Pt seen, awake, on vent.    Vital Signs:  Vital Signs Last 24 Hrs  T(C): 37 (06-04-20 @ 04:00), Max: 37.3 (06-03-20 @ 14:00)  T(F): 98.6 (06-04-20 @ 04:00), Max: 99.2 (06-03-20 @ 14:00)  HR: 95 (06-04-20 @ 11:00) (61 - 107)  BP: 128/79 (06-04-20 @ 11:00) (92/60 - 140/70)  RR: 27 (06-04-20 @ 11:00) (16 - 32)  SpO2: 99% (06-04-20 @ 11:00) (97% - 100%) on (O2)    Telemetry/Alarms:    Relevant labs, radiology and Medications reviewed            MEDICATIONS  (STANDING):  ALBUTerol    90 MICROgram(s) HFA Inhaler 2 Puff(s) Inhalation every 4 hours  ALPRAZolam 0.5 milliGRAM(s) Oral every 6 hours  aspirin  chewable 81 milliGRAM(s) Oral daily  budesonide 160 MICROgram(s)/formoterol 4.5 MICROgram(s) Inhaler 2 Puff(s) Inhalation two times a day  ceFAZolin   IVPB 2000 milliGRAM(s) IV Intermittent every 8 hours  chlorhexidine 0.12% Liquid 15 milliLiter(s) Oral Mucosa every 12 hours  clopidogrel Tablet 75 milliGRAM(s) Oral daily  diltiazem    Tablet 90 milliGRAM(s) Oral every 6 hours  doxazosin 2 milliGRAM(s) Oral at bedtime  enoxaparin Injectable 40 milliGRAM(s) SubCutaneous every 12 hours  gabapentin 400 milliGRAM(s) Oral three times a day  pantoprazole  Injectable 40 milliGRAM(s) IV Push daily  QUEtiapine 50 milliGRAM(s) Oral daily  QUEtiapine 100 milliGRAM(s) Oral at bedtime  thiamine 100 milliGRAM(s) Oral daily  tiotropium 18 MICROgram(s) Capsule 1 Capsule(s) Inhalation daily    MEDICATIONS  (PRN):  acetaminophen   Tablet .. 650 milliGRAM(s) Oral every 6 hours PRN Temp greater or equal to 38.5C (101.3F)  ibuprofen  Tablet. 400 milliGRAM(s) Oral every 6 hours PRN Moderate Pain (4 - 6)      Physical exam  Gen NAD  Neuro Alert  Card RRR  neck- supple, trach midline  Pulm BS b/l  Abd soft, PEG in place  Ext warm, left BKA      I&O's Summary    03 Jun 2020 07:01  -  04 Jun 2020 07:00  --------------------------------------------------------  IN: 2847 mL / OUT: 1825 mL / NET: 1022 mL        Assessment  65y Male  w/ PAST MEDICAL & SURGICAL HISTORY:  Chronic CHF  COPD without exacerbation  Atrial fibrillation  Presence of Watchman left atrial appendage closure device  Hypertension  GERD (gastroesophageal reflux disease)  Chronic obstructive pulmonary disease (COPD)  Anemia  Falls  Meningitis  Collapsed lung  Alcohol withdrawal  Emphysema of lung  Cirrhosis  CHF (congestive heart failure)  Poor historian  Alcohol abuse  Chronic atrial fibrillation  Unilateral amputation of lower extremity below knee  Presence of Watchman left atrial appendage closure device  S/P BKA (below knee amputation) unilateral, left  admitted with complaints of Patient is a 65y old  Male who presents with a chief complaint of acute hypoxemic, hypercapneic resp failure  COPD exacerbation (04 Jun 2020 10:18)  .  65M h/o diastolic CHF, ETOH, COPD admitted with acute respiratory failure due to COPD exacerbation complicated by pneumonia.  Underwent trach/PEG on 5/15/20. Persistent MRSA bacteremia w negative blood cultures 5/29. MYA no vegetations.    awaiting rehab placement  trach/PEG care      Discussed with Cardiothoracic Team at AM rounds.

## 2020-06-04 NOTE — PROGRESS NOTE ADULT - SUBJECTIVE AND OBJECTIVE BOX
[facility] # 46  ICU # 41  Vent # 22 and Trach and PEG # 19    CC:  Respiratory Failure     HPI:    64 y/o male w chronic AFib w Watchman device, HFpEF , COPD on home O2, HTN, ETOH use and pt of size admitted on 4/18 with COPD exacerbation--RRT called on 4/23 for AMS requiring intubation--extubated on 4/26 then re intubated on 4/29.  S/P Trach and PEG on 5/15 and has failed SBT.  Most recent active issues is persistent MSSA sepsis and CRP in sputum--on IV vanco and Cipro.  No central access      5/22:  Pt seen and examined in ICU--vented-- Encephalopathy.  still MSSA + in Bcx  5/23:  Tolerated PSV 12 x 3 hrs on 5/22.  SBT ongoing now.  Poor mentation.  Repeat BCx sent.  Abx changed to Ancef  5/24:  PSV X 4.5 hrs on 5/23.  Rapid AFib O/N.  Still Encephalopathy.  COVID sent.  5/23 BCx still P.     6/1:  Case d/w Dr horne.  Pt seen and examined in ICU--T+P--attempts to follow commands but overall Encephalopathic.  SBT ongoing.  Tm 99.6  6/2:  No restraints overnight.  Calm.  + BM yesterday.  On vent support.  Stable vitals.  No fever.  Stable for placement   6/3:  No restraints.  Slightly more awake and alert.  On Vent support.  Stable for placement.  Stable vitals and no fevers  6/4:  No restraints. No haldol.  Xanzx.  Awake and following commands and attempting to speak.      PMH:  As above.     PSH:  As above.     FH: Non Contributory other than those listed in HPI    Social History:  Unobtainable due to clinical condition     MEDICATIONS  (STANDING):  ALBUTerol    90 MICROgram(s) HFA Inhaler 2 Puff(s) Inhalation every 4 hours  ALPRAZolam 0.5 milliGRAM(s) Oral every 6 hours  aspirin  chewable 81 milliGRAM(s) Oral daily  budesonide 160 MICROgram(s)/formoterol 4.5 MICROgram(s) Inhaler 2 Puff(s) Inhalation two times a day  ceFAZolin   IVPB 2000 milliGRAM(s) IV Intermittent every 8 hours  chlorhexidine 0.12% Liquid 15 milliLiter(s) Oral Mucosa every 12 hours  clopidogrel Tablet 75 milliGRAM(s) Oral daily  diltiazem    Tablet 90 milliGRAM(s) Oral every 6 hours  doxazosin 2 milliGRAM(s) Oral at bedtime  enoxaparin Injectable 40 milliGRAM(s) SubCutaneous every 12 hours  gabapentin 400 milliGRAM(s) Oral three times a day  pantoprazole  Injectable 40 milliGRAM(s) IV Push daily  QUEtiapine 50 milliGRAM(s) Oral daily  QUEtiapine 100 milliGRAM(s) Oral at bedtime  thiamine 100 milliGRAM(s) Oral daily  tiotropium 18 MICROgram(s) Capsule 1 Capsule(s) Inhalation daily    MEDICATIONS  (PRN):  acetaminophen   Tablet .. 650 milliGRAM(s) Oral every 6 hours PRN Temp greater or equal to 38.5C (101.3F)  ibuprofen  Tablet. 400 milliGRAM(s) Oral every 6 hours PRN Moderate Pain (4 - 6)      Allergies: NKDA    ROS:  SEE BELOW        ICU Vital Signs Last 24 Hrs  T(C): 37 (04 Jun 2020 04:00), Max: 37.3 (03 Jun 2020 11:00)  T(F): 98.6 (04 Jun 2020 04:00), Max: 99.2 (03 Jun 2020 11:00)  HR: 83 (04 Jun 2020 09:00) (61 - 107)  BP: 123/59 (04 Jun 2020 09:00) (92/60 - 140/88)  BP(mean): 77 (04 Jun 2020 09:00) (61 - 102)  ABP: --  ABP(mean): --  RR: 24 (04 Jun 2020 09:00) (16 - 32)  SpO2: 97% (04 Jun 2020 09:00) (97% - 100%)      Mode: PS (Pressure Support)/ Spontaneous  RR (machine): 0  FiO2: 40  PEEP: 5  PS: 10  PIP: 16      I&O's Summary    03 Jun 2020 07:01  -  04 Jun 2020 07:00  --------------------------------------------------------  IN: 2847 mL / OUT: 1825 mL / NET: 1022 mL        Physical Exam:  SEE BELOW                              DVT Prophylaxis:                                                            Contraindication:     Advanced Directives:    Discussed with:    Visit Information:  Time spent excluding procedure:      ** Time is exclusive of billed procedures and/or teaching and/or routine family updates.

## 2020-06-04 NOTE — PROGRESS NOTE ADULT - SUBJECTIVE AND OBJECTIVE BOX
Subjective:    pat on vent waiting for placement.    Home Medications:  acetaminophen 325 mg oral tablet: 2 tab(s) orally every 6 hours, As needed, Temp greater or equal to 38.5C (101.3F) (02 Jun 2020 12:53)  albuterol 90 mcg/inh inhalation aerosol: 2 puff(s) inhaled every 4 hours (02 Jun 2020 12:53)  ALPRAZolam 0.5 mg oral tablet: 1 tab(s) orally every 6 hours (02 Jun 2020 12:53)  aspirin 81 mg oral tablet, chewable: 1 tab(s) orally once a day (02 Jun 2020 12:53)  clopidogrel 75 mg oral tablet: 1 tab(s) orally once a day (02 Jun 2020 12:53)  dilTIAZem 90 mg oral tablet: 1 tab(s) orally every 6 hours (02 Jun 2020 12:53)  doxazosin 2 mg oral tablet: 1 tab(s) orally once a day (at bedtime) (02 Jun 2020 12:53)  gabapentin 400 mg oral capsule: 1 cap(s) orally 3 times a day (02 Jun 2020 12:53)  haloperidol 0.5 mg oral tablet: 1 tab(s) orally every 6 hours (02 Jun 2020 12:53)  pantoprazole 40 mg oral granule, enteric coated: 40 milligram(s) orally once a day (19 Apr 2020 03:12)  QUEtiapine 100 mg oral tablet: 1 tab(s) orally once a day (at bedtime) (02 Jun 2020 12:53)  QUEtiapine 50 mg oral tablet: 1 tab(s) orally once a day (02 Jun 2020 12:53)  Spiriva 18 mcg inhalation capsule: 1 cap(s) inhaled once a day (19 Apr 2020 03:12)  thiamine 100 mg oral tablet: 1 tab(s) orally once a day (02 Jun 2020 12:53)    MEDICATIONS  (STANDING):  ALBUTerol    90 MICROgram(s) HFA Inhaler 2 Puff(s) Inhalation every 4 hours  ALPRAZolam 0.5 milliGRAM(s) Oral every 6 hours  aspirin  chewable 81 milliGRAM(s) Oral daily  budesonide 160 MICROgram(s)/formoterol 4.5 MICROgram(s) Inhaler 2 Puff(s) Inhalation two times a day  ceFAZolin   IVPB 2000 milliGRAM(s) IV Intermittent every 8 hours  chlorhexidine 0.12% Liquid 15 milliLiter(s) Oral Mucosa every 12 hours  clopidogrel Tablet 75 milliGRAM(s) Oral daily  diltiazem    Tablet 90 milliGRAM(s) Oral every 6 hours  doxazosin 2 milliGRAM(s) Oral at bedtime  enoxaparin Injectable 40 milliGRAM(s) SubCutaneous every 12 hours  gabapentin 400 milliGRAM(s) Oral three times a day  pantoprazole  Injectable 40 milliGRAM(s) IV Push daily  QUEtiapine 50 milliGRAM(s) Oral daily  QUEtiapine 100 milliGRAM(s) Oral at bedtime  thiamine 100 milliGRAM(s) Oral daily  tiotropium 18 MICROgram(s) Capsule 1 Capsule(s) Inhalation daily    MEDICATIONS  (PRN):  acetaminophen   Tablet .. 650 milliGRAM(s) Oral every 6 hours PRN Temp greater or equal to 38.5C (101.3F)  ibuprofen  Tablet. 400 milliGRAM(s) Oral every 6 hours PRN Moderate Pain (4 - 6)      Allergies    Ceclor (Rash)  Duricef (Rash)    Intolerances        Vital Signs Last 24 Hrs  T(C): 37 (04 Jun 2020 04:00), Max: 37.3 (03 Jun 2020 14:00)  T(F): 98.6 (04 Jun 2020 04:00), Max: 99.2 (03 Jun 2020 14:00)  HR: 106 (04 Jun 2020 12:00) (61 - 107)  BP: 134/76 (04 Jun 2020 12:00) (92/60 - 140/70)  BP(mean): 91 (04 Jun 2020 12:00) (61 - 91)  RR: 21 (04 Jun 2020 12:00) (16 - 32)  SpO2: 100% (04 Jun 2020 12:00) (97% - 100%)  Mode: PS (Pressure Support)/ Spontaneous  RR (machine): 0  FiO2: 40  PEEP: 5  PS: 10  PIP: 16      PHYSICAL EXAMINATION:    NECK:  Supple. No lymphadenopathy. Jugular venous pressure not elevated. Carotids equal.   HEART:   The cardiac impulse has a normal quality. Reg., Nl S1 and S2.  There are no murmurs, rubs or gallops noted  CHEST:  Chest is clear to auscultation. Normal respiratory effort.  ABDOMEN:  Soft and nontender.   EXTREMITIES:  There is no edema.       LABS:

## 2020-06-05 PROCEDURE — 99232 SBSQ HOSP IP/OBS MODERATE 35: CPT

## 2020-06-05 RX ADMIN — GABAPENTIN 400 MILLIGRAM(S): 400 CAPSULE ORAL at 21:23

## 2020-06-05 RX ADMIN — PANTOPRAZOLE SODIUM 40 MILLIGRAM(S): 20 TABLET, DELAYED RELEASE ORAL at 12:57

## 2020-06-05 RX ADMIN — CHLORHEXIDINE GLUCONATE 15 MILLILITER(S): 213 SOLUTION TOPICAL at 05:55

## 2020-06-05 RX ADMIN — QUETIAPINE FUMARATE 100 MILLIGRAM(S): 200 TABLET, FILM COATED ORAL at 21:23

## 2020-06-05 RX ADMIN — ENOXAPARIN SODIUM 40 MILLIGRAM(S): 100 INJECTION SUBCUTANEOUS at 17:25

## 2020-06-05 RX ADMIN — Medication 81 MILLIGRAM(S): at 12:58

## 2020-06-05 RX ADMIN — Medication 0.5 MILLIGRAM(S): at 02:30

## 2020-06-05 RX ADMIN — Medication 0.5 MILLIGRAM(S): at 21:23

## 2020-06-05 RX ADMIN — BUDESONIDE AND FORMOTEROL FUMARATE DIHYDRATE 2 PUFF(S): 160; 4.5 AEROSOL RESPIRATORY (INHALATION) at 21:01

## 2020-06-05 RX ADMIN — Medication 400 MILLIGRAM(S): at 12:57

## 2020-06-05 RX ADMIN — CLOPIDOGREL BISULFATE 75 MILLIGRAM(S): 75 TABLET, FILM COATED ORAL at 12:57

## 2020-06-05 RX ADMIN — GABAPENTIN 400 MILLIGRAM(S): 400 CAPSULE ORAL at 15:05

## 2020-06-05 RX ADMIN — CHLORHEXIDINE GLUCONATE 15 MILLILITER(S): 213 SOLUTION TOPICAL at 17:21

## 2020-06-05 RX ADMIN — QUETIAPINE FUMARATE 50 MILLIGRAM(S): 200 TABLET, FILM COATED ORAL at 12:58

## 2020-06-05 RX ADMIN — ALBUTEROL 2 PUFF(S): 90 AEROSOL, METERED ORAL at 23:46

## 2020-06-05 RX ADMIN — Medication 0.5 MILLIGRAM(S): at 12:57

## 2020-06-05 RX ADMIN — Medication 2 MILLIGRAM(S): at 21:23

## 2020-06-05 RX ADMIN — ENOXAPARIN SODIUM 40 MILLIGRAM(S): 100 INJECTION SUBCUTANEOUS at 05:55

## 2020-06-05 RX ADMIN — Medication 90 MILLIGRAM(S): at 17:25

## 2020-06-05 RX ADMIN — GABAPENTIN 400 MILLIGRAM(S): 400 CAPSULE ORAL at 05:55

## 2020-06-05 RX ADMIN — Medication 90 MILLIGRAM(S): at 12:57

## 2020-06-05 RX ADMIN — Medication 90 MILLIGRAM(S): at 05:56

## 2020-06-05 RX ADMIN — Medication 100 MILLIGRAM(S): at 12:58

## 2020-06-05 RX ADMIN — Medication 100 MILLIGRAM(S): at 05:55

## 2020-06-05 NOTE — PROGRESS NOTE ADULT - SUBJECTIVE AND OBJECTIVE BOX
Subjective:  Pt seen, awaiting rehab.    Vital Signs:  Vital Signs Last 24 Hrs  T(C): 36.6 (06-05-20 @ 11:00), Max: 36.7 (06-04-20 @ 16:00)  T(F): 97.8 (06-05-20 @ 11:00), Max: 98 (06-04-20 @ 16:00)  HR: 122 (06-05-20 @ 11:00) (67 - 122)  BP: 137/76 (06-05-20 @ 11:00) (96/57 - 147/75)  RR: 26 (06-05-20 @ 11:00) (18 - 30)  SpO2: 98% (06-05-20 @ 11:00) (91% - 100%) on (O2)    Telemetry/Alarms:    Relevant labs, radiology and Medications reviewed            MEDICATIONS  (STANDING):  ALBUTerol    90 MICROgram(s) HFA Inhaler 2 Puff(s) Inhalation every 4 hours  ALPRAZolam 0.5 milliGRAM(s) Oral every 6 hours  aspirin  chewable 81 milliGRAM(s) Oral daily  budesonide 160 MICROgram(s)/formoterol 4.5 MICROgram(s) Inhaler 2 Puff(s) Inhalation two times a day  chlorhexidine 0.12% Liquid 15 milliLiter(s) Oral Mucosa every 12 hours  clopidogrel Tablet 75 milliGRAM(s) Oral daily  diltiazem    Tablet 90 milliGRAM(s) Oral every 6 hours  doxazosin 2 milliGRAM(s) Oral at bedtime  enoxaparin Injectable 40 milliGRAM(s) SubCutaneous every 12 hours  gabapentin 400 milliGRAM(s) Oral three times a day  pantoprazole  Injectable 40 milliGRAM(s) IV Push daily  QUEtiapine 50 milliGRAM(s) Oral daily  QUEtiapine 100 milliGRAM(s) Oral at bedtime  thiamine 100 milliGRAM(s) Oral daily  tiotropium 18 MICROgram(s) Capsule 1 Capsule(s) Inhalation daily    MEDICATIONS  (PRN):  acetaminophen   Tablet .. 650 milliGRAM(s) Oral every 6 hours PRN Temp greater or equal to 38.5C (101.3F)  ibuprofen  Tablet. 400 milliGRAM(s) Oral every 6 hours PRN Moderate Pain (4 - 6)      Physical exam  Gen NAD  Neuro Alert  Card RRR  neck- supple, trach midline  Pulm BS b/l  Abd soft, PEG in place  Ext warm, left BKA    I&O's Summary    04 Jun 2020 07:01  -  05 Jun 2020 07:00  --------------------------------------------------------  IN: 2092 mL / OUT: 3000 mL / NET: -908 mL        Assessment  65y Male  w/ PAST MEDICAL & SURGICAL HISTORY:  Chronic CHF  COPD without exacerbation  Atrial fibrillation  Presence of Watchman left atrial appendage closure device  Hypertension  GERD (gastroesophageal reflux disease)  Chronic obstructive pulmonary disease (COPD)  Anemia  Falls  Meningitis  Collapsed lung  Alcohol withdrawal  Emphysema of lung  Cirrhosis  CHF (congestive heart failure)  Poor historian  Alcohol abuse  Chronic atrial fibrillation  Unilateral amputation of lower extremity below knee  Presence of Watchman left atrial appendage closure device  S/P BKA (below knee amputation) unilateral, left  admitted with complaints of Patient is a 65y old  Male who presents with a chief complaint of acute hypoxemic, hypercapneic resp failure  COPD exacerbation (05 Jun 2020 13:55)  .  65M h/o diastolic CHF, ETOH, COPD admitted with acute respiratory failure due to COPD exacerbation complicated by pneumonia.  Underwent trach/PEG on 5/15/20. Persistent MRSA bacteremia w negative blood cultures 5/29. MYA no vegetations.    awaiting rehab placement  trach/PEG care    Discussed with Cardiothoracic Team at AM rounds.

## 2020-06-05 NOTE — PROGRESS NOTE ADULT - SUBJECTIVE AND OBJECTIVE BOX
Subjective:    pat on PS, awake, no respiratory distress.    Home Medications:  acetaminophen 325 mg oral tablet: 2 tab(s) orally every 6 hours, As needed, Temp greater or equal to 38.5C (101.3F) (02 Jun 2020 12:53)  albuterol 90 mcg/inh inhalation aerosol: 2 puff(s) inhaled every 4 hours (02 Jun 2020 12:53)  ALPRAZolam 0.5 mg oral tablet: 1 tab(s) orally every 6 hours (02 Jun 2020 12:53)  aspirin 81 mg oral tablet, chewable: 1 tab(s) orally once a day (02 Jun 2020 12:53)  clopidogrel 75 mg oral tablet: 1 tab(s) orally once a day (02 Jun 2020 12:53)  dilTIAZem 90 mg oral tablet: 1 tab(s) orally every 6 hours (02 Jun 2020 12:53)  doxazosin 2 mg oral tablet: 1 tab(s) orally once a day (at bedtime) (02 Jun 2020 12:53)  gabapentin 400 mg oral capsule: 1 cap(s) orally 3 times a day (02 Jun 2020 12:53)  pantoprazole 40 mg oral granule, enteric coated: 40 milligram(s) orally once a day (19 Apr 2020 03:12)  QUEtiapine 100 mg oral tablet: 1 tab(s) orally once a day (at bedtime) (02 Jun 2020 12:53)  QUEtiapine 50 mg oral tablet: 1 tab(s) orally once a day (02 Jun 2020 12:53)  Spiriva 18 mcg inhalation capsule: 1 cap(s) inhaled once a day (19 Apr 2020 03:12)  thiamine 100 mg oral tablet: 1 tab(s) orally once a day (02 Jun 2020 12:53)    MEDICATIONS  (STANDING):  ALBUTerol    90 MICROgram(s) HFA Inhaler 2 Puff(s) Inhalation every 4 hours  ALPRAZolam 0.5 milliGRAM(s) Oral every 6 hours  aspirin  chewable 81 milliGRAM(s) Oral daily  budesonide 160 MICROgram(s)/formoterol 4.5 MICROgram(s) Inhaler 2 Puff(s) Inhalation two times a day  chlorhexidine 0.12% Liquid 15 milliLiter(s) Oral Mucosa every 12 hours  clopidogrel Tablet 75 milliGRAM(s) Oral daily  diltiazem    Tablet 90 milliGRAM(s) Oral every 6 hours  doxazosin 2 milliGRAM(s) Oral at bedtime  enoxaparin Injectable 40 milliGRAM(s) SubCutaneous every 12 hours  gabapentin 400 milliGRAM(s) Oral three times a day  pantoprazole  Injectable 40 milliGRAM(s) IV Push daily  QUEtiapine 50 milliGRAM(s) Oral daily  QUEtiapine 100 milliGRAM(s) Oral at bedtime  thiamine 100 milliGRAM(s) Oral daily  tiotropium 18 MICROgram(s) Capsule 1 Capsule(s) Inhalation daily    MEDICATIONS  (PRN):  acetaminophen   Tablet .. 650 milliGRAM(s) Oral every 6 hours PRN Temp greater or equal to 38.5C (101.3F)  ibuprofen  Tablet. 400 milliGRAM(s) Oral every 6 hours PRN Moderate Pain (4 - 6)      Allergies    Ceclor (Rash)  Duricef (Rash)    Intolerances        Vital Signs Last 24 Hrs  T(C): 36.6 (05 Jun 2020 11:00), Max: 36.7 (04 Jun 2020 16:00)  T(F): 97.8 (05 Jun 2020 11:00), Max: 98 (04 Jun 2020 16:00)  HR: 122 (05 Jun 2020 11:00) (67 - 122)  BP: 137/76 (05 Jun 2020 11:00) (96/57 - 147/75)  BP(mean): 87 (05 Jun 2020 11:00) (36 - 116)  RR: 26 (05 Jun 2020 11:00) (18 - 30)  SpO2: 98% (05 Jun 2020 11:00) (91% - 100%)  Mode: AC/ CMV (Assist Control/ Continuous Mandatory Ventilation)  RR (machine): 15  TV (machine): 450  FiO2: 40  PEEP: 5  ITime: 0.7  MAP: 16  PIP: 35      PHYSICAL EXAMINATION:    NECK:  Supple. No lymphadenopathy. Jugular venous pressure not elevated. Carotids equal.   HEART:   The cardiac impulse has a normal quality. Reg., Nl S1 and S2.  There are no murmurs, rubs or gallops noted  CHEST:  Chest crackles to auscultation. Normal respiratory effort.  ABDOMEN:  Soft and nontender.   EXTREMITIES:  There is no edema.       LABS:

## 2020-06-05 NOTE — PROGRESS NOTE ADULT - ASSESSMENT
IMP:    64 y/o male w chronic AFib w Watchman device, HFpEF , COPD on home O2, HTN, ETOH use and pt of size admitted on 4/18 with COPD exacerbation--RRT called on 4/23 for AMS requiring intubation--extubated on 4/26 then re intubated on 4/29.  S/P Trach and PEG on 5/15 and has failed SBT.  Most recent active issue is persistent MSSA sepsis and CRP in sputum--Remains on IV Ancef and completed Cipro.  No central access   Chronic resp failure  TM Encephalopathy and agitation--much better     Plan:    SBT as tolerated  IV vanco--stopped on 5/22.  Ancef (15)--last dose today and completed Cipro.  Repeat BCx 5/29 negative   Xanax to 0.5 q 6  DC haldol   Seroquel and Oxy (QTc < 450)  Cont clopidogrel and ASA for CAD and AFib--Dilt increased to 90 q6   TF to goal  HOB > 30  Started cardura   DVT prophy--SC and LMWH  OOB or sitting position today    ICU care--d/w ICU staff on multi disciplinary rounds.  Stable for placement today.

## 2020-06-05 NOTE — PROGRESS NOTE ADULT - SUBJECTIVE AND OBJECTIVE BOX
[facility]  ICU # 42  Vent # 22 and Trach and PEG # 20    CC:  Respiratory Failure     HPI:    66 y/o male w chronic AFib w Watchman device, HFpEF , COPD on home O2, HTN, ETOH use and pt of size admitted on 4/18 with COPD exacerbation--RRT called on 4/23 for AMS requiring intubation--extubated on 4/26 then re intubated on 4/29.  S/P Trach and PEG on 5/15 and has failed SBT.  Most recent active issues is persistent MSSA sepsis and CRP in sputum--on IV vanco and Cipro.  No central access      5/22:  Pt seen and examined in ICU--vented-- Encephalopathy.  still MSSA + in Bcx  5/23:  Tolerated PSV 12 x 3 hrs on 5/22.  SBT ongoing now.  Poor mentation.  Repeat BCx sent.  Abx changed to Ancef  5/24:  PSV X 4.5 hrs on 5/23.  Rapid AFib O/N.  Still Encephalopathy.  COVID sent.  5/23 BCx still P.     6/1:  Case d/w Dr horne.  Pt seen and examined in ICU--T+P--attempts to follow commands but overall Encephalopathic.  SBT ongoing.  Tm 99.6  6/2:  No restraints overnight.  Calm.  + BM yesterday.  On vent support.  Stable vitals.  No fever.  Stable for placement   6/3:  No restraints.  Slightly more awake and alert.  On Vent support.  Stable for placement.  Stable vitals and no fevers  6/4:  No restraints. No haldol.  Xanzx.  Awake and following commands and attempting to speak  6/5:  No events overnight.  Able to engage and follow simple commands.  No restraints.  Last day of Ancef    PMH:  As above.     PSH:  As above.     FH: Non Contributory other than those listed in HPI    Social History:  Unobtainable due to clinical condition     MEDICATIONS  (STANDING):  ALBUTerol    90 MICROgram(s) HFA Inhaler 2 Puff(s) Inhalation every 4 hours  ALPRAZolam 0.5 milliGRAM(s) Oral every 6 hours  aspirin  chewable 81 milliGRAM(s) Oral daily  budesonide 160 MICROgram(s)/formoterol 4.5 MICROgram(s) Inhaler 2 Puff(s) Inhalation two times a day  ceFAZolin   IVPB 2000 milliGRAM(s) IV Intermittent every 8 hours  chlorhexidine 0.12% Liquid 15 milliLiter(s) Oral Mucosa every 12 hours  clopidogrel Tablet 75 milliGRAM(s) Oral daily  diltiazem    Tablet 90 milliGRAM(s) Oral every 6 hours  doxazosin 2 milliGRAM(s) Oral at bedtime  enoxaparin Injectable 40 milliGRAM(s) SubCutaneous every 12 hours  gabapentin 400 milliGRAM(s) Oral three times a day  pantoprazole  Injectable 40 milliGRAM(s) IV Push daily  QUEtiapine 50 milliGRAM(s) Oral daily  QUEtiapine 100 milliGRAM(s) Oral at bedtime  thiamine 100 milliGRAM(s) Oral daily  tiotropium 18 MICROgram(s) Capsule 1 Capsule(s) Inhalation daily    MEDICATIONS  (PRN):  acetaminophen   Tablet .. 650 milliGRAM(s) Oral every 6 hours PRN Temp greater or equal to 38.5C (101.3F)  ibuprofen  Tablet. 400 milliGRAM(s) Oral every 6 hours PRN Moderate Pain (4 - 6)      Allergies: NKDA    ROS:  SEE BELOW        ICU Vital Signs Last 24 Hrs  T(C): 36.3 (05 Jun 2020 06:00), Max: 37 (04 Jun 2020 11:00)  T(F): 97.4 (05 Jun 2020 06:00), Max: 98.6 (04 Jun 2020 11:00)  HR: 96 (05 Jun 2020 07:00) (67 - 115)  BP: 131/75 (05 Jun 2020 07:00) (96/57 - 144/108)  BP(mean): 86 (05 Jun 2020 07:00) (36 - 116)  ABP: --  ABP(mean): --  RR: 21 (05 Jun 2020 07:00) (18 - 30)  SpO2: 100% (05 Jun 2020 08:00) (91% - 100%)      Mode: Spontaneous/ CPAP (Continuous Positive Airway Pressure)  FiO2: 40  PEEP: 5  PS: 10  MAP: 15  PIP: 34      I&O's Summary    04 Jun 2020 07:01  -  05 Jun 2020 07:00  --------------------------------------------------------  IN: 2092 mL / OUT: 3000 mL / NET: -908 mL        Physical Exam:  SEE BELOW                              DVT Prophylaxis:                                                            Contraindication:     Advanced Directives:    Discussed with:    Visit Information:  Time spent excluding procedure:      ** Time is exclusive of billed procedures and/or teaching and/or routine family updates.

## 2020-06-05 NOTE — CHART NOTE - NSCHARTNOTEFT_GEN_A_CORE
Assessment:   *pt pending placement.    *labs reviewed; lytes WNL.  magnesium at lower end of normal.    *(+1) generalized.  (+2) Lt/Rt hand/arm, Rt foot/ankle edema.  BM (+) 6/3.  urine output: 3000mL.    *per flowsheet, pt received ~1379mL/day of Jevity 1.5 over the past 3 days.  Combined with ~4pkts prosource TF per day (per flowsheet), pt received ~2229Kcal and 132g protein.  meeting ~99% of estimated calorie needs and ~100% of estimated protein needs.  Current TF Rx remains appropriate.  Pt also ordered for 55mL q1hr free water flushes, which provide ~1100mL free water; combined with TF = 2316mL free water.    Recommendations:  1) c/w jevity 1.5 @ 80mL/hr with 6pkts Prosource TF daily  2) monitor hydration status; adjust free water flushes prn  3) monitor TF tolerance; keep back of bed > 35 degrees  4) obtain new wt when feasible to track/trend changes      Diet Prescription: Diet, NPO with Tube Feed:   Tube Feeding Modality: Gastrostomy  Jevity 1.5 Randall (JEVITY1.5)  Total Volume for 24 Hours (mL): 1920  Continuous  Until Goal Tube Feed Rate (mL per Hour): 80  Tube Feed Duration (in Hours): 24  Tube Feed Start Time: 00:00  No Carb Prosource TF     Qty per Day:  6 (06-01-20 @ 13:37)      Wt Hx:  117.1Kg (5/9)  117.9Kg (4/18)      06-04-20 @ 07:01  -  06-05-20 @ 07:00  --------------------------------------------------------  IN: 2092 mL / OUT: 3000 mL / NET: -908 mL        Estimated Needs:   2250-2700Kcal (25-30Kcal/Kg of adjust BW; 90Kg)  126-162g protein (1.4-1.8g/Kg adjusted BW: 90kg)  2250-2700mL (25-30mL/Kg of adjusted BW: 90Kg)      Pertinent Labs:  06-01 Phos 4.0 mg/dL 05-10 Chol --    LDL --    HDL --    Trig 106 mg/dL        Skin: karla score = 15  SDTI on Rt buttock  stage 2 PU on Lt buttock    Monitoring and Evaluation:   [x] PO intake/Nutr support infusion [ x ] Tolerance to Nutr [ x ] weights [ x ] labs[ x ] follow up per protocol  [ ] other:

## 2020-06-06 PROCEDURE — 99232 SBSQ HOSP IP/OBS MODERATE 35: CPT

## 2020-06-06 RX ADMIN — Medication 90 MILLIGRAM(S): at 05:20

## 2020-06-06 RX ADMIN — Medication 90 MILLIGRAM(S): at 12:51

## 2020-06-06 RX ADMIN — PANTOPRAZOLE SODIUM 40 MILLIGRAM(S): 20 TABLET, DELAYED RELEASE ORAL at 12:51

## 2020-06-06 RX ADMIN — Medication 0.5 MILLIGRAM(S): at 00:39

## 2020-06-06 RX ADMIN — Medication 90 MILLIGRAM(S): at 00:39

## 2020-06-06 RX ADMIN — GABAPENTIN 400 MILLIGRAM(S): 400 CAPSULE ORAL at 21:11

## 2020-06-06 RX ADMIN — Medication 0.5 MILLIGRAM(S): at 17:40

## 2020-06-06 RX ADMIN — GABAPENTIN 400 MILLIGRAM(S): 400 CAPSULE ORAL at 16:16

## 2020-06-06 RX ADMIN — Medication 100 MILLIGRAM(S): at 12:51

## 2020-06-06 RX ADMIN — QUETIAPINE FUMARATE 100 MILLIGRAM(S): 200 TABLET, FILM COATED ORAL at 21:11

## 2020-06-06 RX ADMIN — ENOXAPARIN SODIUM 40 MILLIGRAM(S): 100 INJECTION SUBCUTANEOUS at 05:20

## 2020-06-06 RX ADMIN — CLOPIDOGREL BISULFATE 75 MILLIGRAM(S): 75 TABLET, FILM COATED ORAL at 12:51

## 2020-06-06 RX ADMIN — ALBUTEROL 2 PUFF(S): 90 AEROSOL, METERED ORAL at 20:28

## 2020-06-06 RX ADMIN — Medication 81 MILLIGRAM(S): at 12:51

## 2020-06-06 RX ADMIN — BUDESONIDE AND FORMOTEROL FUMARATE DIHYDRATE 2 PUFF(S): 160; 4.5 AEROSOL RESPIRATORY (INHALATION) at 20:23

## 2020-06-06 RX ADMIN — QUETIAPINE FUMARATE 50 MILLIGRAM(S): 200 TABLET, FILM COATED ORAL at 12:51

## 2020-06-06 RX ADMIN — CHLORHEXIDINE GLUCONATE 15 MILLILITER(S): 213 SOLUTION TOPICAL at 06:51

## 2020-06-06 RX ADMIN — Medication 0.5 MILLIGRAM(S): at 05:20

## 2020-06-06 RX ADMIN — ENOXAPARIN SODIUM 40 MILLIGRAM(S): 100 INJECTION SUBCUTANEOUS at 17:40

## 2020-06-06 RX ADMIN — CHLORHEXIDINE GLUCONATE 15 MILLILITER(S): 213 SOLUTION TOPICAL at 17:41

## 2020-06-06 RX ADMIN — ALBUTEROL 2 PUFF(S): 90 AEROSOL, METERED ORAL at 04:20

## 2020-06-06 RX ADMIN — Medication 0.5 MILLIGRAM(S): at 12:51

## 2020-06-06 RX ADMIN — Medication 2 MILLIGRAM(S): at 21:11

## 2020-06-06 RX ADMIN — GABAPENTIN 400 MILLIGRAM(S): 400 CAPSULE ORAL at 05:20

## 2020-06-06 RX ADMIN — Medication 90 MILLIGRAM(S): at 17:41

## 2020-06-06 NOTE — PROGRESS NOTE ADULT - SUBJECTIVE AND OBJECTIVE BOX
[facility] # 48  ICU # 43  Vent # 22 and Trach and PEG # 21    CC:  Respiratory Failure     HPI:    64 y/o male w chronic AFib w Watchman device, HFpEF , COPD on home O2, HTN, ETOH use and pt of size admitted on 4/18 with COPD exacerbation--RRT called on 4/23 for AMS requiring intubation--extubated on 4/26 then re intubated on 4/29.  S/P Trach and PEG on 5/15 and has failed SBT.  Most recent active issues is persistent MSSA sepsis and CRP in sputum--on IV vanco and Cipro.  No central access      5/22:  Pt seen and examined in ICU--vented-- Encephalopathy.  still MSSA + in Bcx  5/23:  Tolerated PSV 12 x 3 hrs on 5/22.  SBT ongoing now.  Poor mentation.  Repeat BCx sent.  Abx changed to Ancef  5/24:  PSV X 4.5 hrs on 5/23.  Rapid AFib O/N.  Still Encephalopathy.  COVID sent.  5/23 BCx still P.     6/1:  Case d/w Dr horne.  Pt seen and examined in ICU--T+P--attempts to follow commands but overall Encephalopathic.  SBT ongoing.  Tm 99.6  6/2:  No restraints overnight.  Calm.  + BM yesterday.  On vent support.  Stable vitals.  No fever.  Stable for placement   6/3:  No restraints.  Slightly more awake and alert.  On Vent support.  Stable for placement.  Stable vitals and no fevers  6/4:  No restraints. No haldol.  Xanzx.  Awake and following commands and attempting to speak  6/5:  No events overnight.  Able to engage and follow simple commands.  No restraints.  Last day of Ancef  6/6:  No events overnight.  Tolerated SBT 5.5 hrs on 6/5.  Awake.  Follows commands.  Restless but but responsive    PMH:  As above.     PSH:  As above.     FH: Non Contributory other than those listed in HPI    Social History:  Unobtainable due to clinical condition     MEDICATIONS  (STANDING):  ALBUTerol    90 MICROgram(s) HFA Inhaler 2 Puff(s) Inhalation every 4 hours  ALPRAZolam 0.5 milliGRAM(s) Oral every 6 hours  aspirin  chewable 81 milliGRAM(s) Oral daily  budesonide 160 MICROgram(s)/formoterol 4.5 MICROgram(s) Inhaler 2 Puff(s) Inhalation two times a day  chlorhexidine 0.12% Liquid 15 milliLiter(s) Oral Mucosa every 12 hours  clopidogrel Tablet 75 milliGRAM(s) Oral daily  diltiazem    Tablet 90 milliGRAM(s) Oral every 6 hours  doxazosin 2 milliGRAM(s) Oral at bedtime  enoxaparin Injectable 40 milliGRAM(s) SubCutaneous every 12 hours  gabapentin 400 milliGRAM(s) Oral three times a day  pantoprazole  Injectable 40 milliGRAM(s) IV Push daily  QUEtiapine 50 milliGRAM(s) Oral daily  QUEtiapine 100 milliGRAM(s) Oral at bedtime  thiamine 100 milliGRAM(s) Oral daily  tiotropium 18 MICROgram(s) Capsule 1 Capsule(s) Inhalation daily    MEDICATIONS  (PRN):  acetaminophen   Tablet .. 650 milliGRAM(s) Oral every 6 hours PRN Temp greater or equal to 38.5C (101.3F)  ibuprofen  Tablet. 400 milliGRAM(s) Oral every 6 hours PRN Moderate Pain (4 - 6)      Allergies: NKDA    ROS:  SEE BELOW        ICU Vital Signs Last 24 Hrs  T(C): 37.3 (06 Jun 2020 05:00), Max: 37.3 (06 Jun 2020 05:00)  T(F): 99.1 (06 Jun 2020 05:00), Max: 99.1 (06 Jun 2020 05:00)  HR: 83 (06 Jun 2020 08:00) (66 - 131)  BP: 82/68 (06 Jun 2020 08:00) (82/68 - 140/60)  BP(mean): 72 (06 Jun 2020 08:00) (66 - 104)  ABP: --  ABP(mean): --  RR: 27 (06 Jun 2020 08:00) (18 - 29)  SpO2: 100% (06 Jun 2020 08:00) (93% - 100%)      Mode: Spontaneous/ CPAP (Continuous Positive Airway Pressure)  FiO2: 40  PEEP: 5  PS: 10  PIP: 32      I&O's Summary    05 Jun 2020 07:01  -  06 Jun 2020 07:00  --------------------------------------------------------  IN: 885 mL / OUT: 2600 mL / NET: -1715 mL        Physical Exam:  SEE BELOW                              DVT Prophylaxis:                                                            Contraindication:     Advanced Directives:    Discussed with:    Visit Information:  Time spent excluding procedure:      ** Time is exclusive of billed procedures and/or teaching and/or routine family updates.

## 2020-06-06 NOTE — PROGRESS NOTE ADULT - ASSESSMENT
IMP:    66 y/o male w chronic AFib w Watchman device, HFpEF , COPD on home O2, HTN, ETOH use and pt of size admitted on 4/18 with COPD exacerbation--RRT called on 4/23 for AMS requiring intubation--extubated on 4/26 then re intubated on 4/29.  S/P Trach and PEG on 5/15 and has failed SBT.  Most recent active issue is persistent MSSA sepsis and CRP in sputum--Completed IV Ancef (6/5) and completed Cipro.  No central access   Chronic resp failure  TM Encephalopathy and agitation--much better     Plan:    SBT as tolerated  IV vanco--stopped on 5/22.  Completed Ancef (15) and completed Cipro.  Repeat BCx 5/29 negative   Xanax to 0.5 q 6  DC haldol   Seroquel and Oxy (QTc < 450)  Cont clopidogrel and ASA for CAD and AFib--Dilt increased to 90 q6   TF to goal  HOB > 30  Started cardura   DVT prophy--SC and LMWH  OOB or sitting position today   No need for daily labs    ICU care--d/w ICU staff on multi disciplinary rounds.  Stable for placement today.   Spoke with Litzy at bedside-- All concerns addressed including but not limited to diagnosis, treatment plan and overall prognosis

## 2020-06-06 NOTE — PROGRESS NOTE ADULT - SUBJECTIVE AND OBJECTIVE BOX
Subjective:  Pt seen, restless.     Vital Signs:  Vital Signs Last 24 Hrs  T(C): 37.3 (06-06-20 @ 05:00), Max: 37.3 (06-06-20 @ 05:00)  T(F): 99.1 (06-06-20 @ 05:00), Max: 99.1 (06-06-20 @ 05:00)  HR: 84 (06-06-20 @ 10:00) (66 - 131)  BP: 122/65 (06-06-20 @ 10:00) (82/68 - 136/93)  RR: 30 (06-06-20 @ 10:00) (18 - 30)  SpO2: 100% (06-06-20 @ 10:00) (93% - 100%) on (O2)    Telemetry/Alarms:    Relevant labs, radiology and Medications reviewed            MEDICATIONS  (STANDING):  ALBUTerol    90 MICROgram(s) HFA Inhaler 2 Puff(s) Inhalation every 4 hours  ALPRAZolam 0.5 milliGRAM(s) Oral every 6 hours  aspirin  chewable 81 milliGRAM(s) Oral daily  budesonide 160 MICROgram(s)/formoterol 4.5 MICROgram(s) Inhaler 2 Puff(s) Inhalation two times a day  chlorhexidine 0.12% Liquid 15 milliLiter(s) Oral Mucosa every 12 hours  clopidogrel Tablet 75 milliGRAM(s) Oral daily  diltiazem    Tablet 90 milliGRAM(s) Oral every 6 hours  doxazosin 2 milliGRAM(s) Oral at bedtime  enoxaparin Injectable 40 milliGRAM(s) SubCutaneous every 12 hours  gabapentin 400 milliGRAM(s) Oral three times a day  pantoprazole  Injectable 40 milliGRAM(s) IV Push daily  QUEtiapine 50 milliGRAM(s) Oral daily  QUEtiapine 100 milliGRAM(s) Oral at bedtime  thiamine 100 milliGRAM(s) Oral daily  tiotropium 18 MICROgram(s) Capsule 1 Capsule(s) Inhalation daily    MEDICATIONS  (PRN):  acetaminophen   Tablet .. 650 milliGRAM(s) Oral every 6 hours PRN Temp greater or equal to 38.5C (101.3F)  ibuprofen  Tablet. 400 milliGRAM(s) Oral every 6 hours PRN Moderate Pain (4 - 6)      Physical exam  Gen NAD  Neuro Alert  Card RRR  neck- supple, trach midline  Pulm BS b/l  Abd soft, PEG in place  Ext warm, left BKA    I&O's Summary    05 Jun 2020 07:01  -  06 Jun 2020 07:00  --------------------------------------------------------  IN: 885 mL / OUT: 2600 mL / NET: -1715 mL        Assessment  65y Male  w/ PAST MEDICAL & SURGICAL HISTORY:  Chronic CHF  COPD without exacerbation  Atrial fibrillation  Presence of Watchman left atrial appendage closure device  Hypertension  GERD (gastroesophageal reflux disease)  Chronic obstructive pulmonary disease (COPD)  Anemia  Falls  Meningitis  Collapsed lung  Alcohol withdrawal  Emphysema of lung  Cirrhosis  CHF (congestive heart failure)  Poor historian  Alcohol abuse  Chronic atrial fibrillation  Unilateral amputation of lower extremity below knee  Presence of Watchman left atrial appendage closure device  S/P BKA (below knee amputation) unilateral, left  admitted with complaints of Patient is a 65y old  Male who presents with a chief complaint of acute hypoxemic, hypercapneic resp failure  COPD exacerbation (06 Jun 2020 09:57)    65M h/o diastolic CHF, ETOH, COPD admitted with acute respiratory failure due to COPD exacerbation complicated by pneumonia.  Underwent trach/PEG on 5/15/20. Persistent MRSA bacteremia w negative blood cultures 5/29. MYA no vegetations.    awaiting rehab placement  trach/PEG care  Discussed with Cardiothoracic Team at AM rounds.

## 2020-06-06 NOTE — PROGRESS NOTE ADULT - SUBJECTIVE AND OBJECTIVE BOX
Subjective:    pat on PS, awake, no new complaint.    Home Medications:  acetaminophen 325 mg oral tablet: 2 tab(s) orally every 6 hours, As needed, Temp greater or equal to 38.5C (101.3F) (02 Jun 2020 12:53)  albuterol 90 mcg/inh inhalation aerosol: 2 puff(s) inhaled every 4 hours (02 Jun 2020 12:53)  ALPRAZolam 0.5 mg oral tablet: 1 tab(s) orally every 6 hours (02 Jun 2020 12:53)  aspirin 81 mg oral tablet, chewable: 1 tab(s) orally once a day (02 Jun 2020 12:53)  clopidogrel 75 mg oral tablet: 1 tab(s) orally once a day (02 Jun 2020 12:53)  dilTIAZem 90 mg oral tablet: 1 tab(s) orally every 6 hours (02 Jun 2020 12:53)  doxazosin 2 mg oral tablet: 1 tab(s) orally once a day (at bedtime) (02 Jun 2020 12:53)  gabapentin 400 mg oral capsule: 1 cap(s) orally 3 times a day (02 Jun 2020 12:53)  pantoprazole 40 mg oral granule, enteric coated: 40 milligram(s) orally once a day (19 Apr 2020 03:12)  QUEtiapine 100 mg oral tablet: 1 tab(s) orally once a day (at bedtime) (02 Jun 2020 12:53)  QUEtiapine 50 mg oral tablet: 1 tab(s) orally once a day (02 Jun 2020 12:53)  Spiriva 18 mcg inhalation capsule: 1 cap(s) inhaled once a day (19 Apr 2020 03:12)  thiamine 100 mg oral tablet: 1 tab(s) orally once a day (02 Jun 2020 12:53)    MEDICATIONS  (STANDING):  ALBUTerol    90 MICROgram(s) HFA Inhaler 2 Puff(s) Inhalation every 4 hours  ALPRAZolam 0.5 milliGRAM(s) Oral every 6 hours  aspirin  chewable 81 milliGRAM(s) Oral daily  budesonide 160 MICROgram(s)/formoterol 4.5 MICROgram(s) Inhaler 2 Puff(s) Inhalation two times a day  chlorhexidine 0.12% Liquid 15 milliLiter(s) Oral Mucosa every 12 hours  clopidogrel Tablet 75 milliGRAM(s) Oral daily  diltiazem    Tablet 90 milliGRAM(s) Oral every 6 hours  doxazosin 2 milliGRAM(s) Oral at bedtime  enoxaparin Injectable 40 milliGRAM(s) SubCutaneous every 12 hours  gabapentin 400 milliGRAM(s) Oral three times a day  pantoprazole  Injectable 40 milliGRAM(s) IV Push daily  QUEtiapine 50 milliGRAM(s) Oral daily  QUEtiapine 100 milliGRAM(s) Oral at bedtime  thiamine 100 milliGRAM(s) Oral daily  tiotropium 18 MICROgram(s) Capsule 1 Capsule(s) Inhalation daily    MEDICATIONS  (PRN):  acetaminophen   Tablet .. 650 milliGRAM(s) Oral every 6 hours PRN Temp greater or equal to 38.5C (101.3F)  ibuprofen  Tablet. 400 milliGRAM(s) Oral every 6 hours PRN Moderate Pain (4 - 6)      Allergies    Ceclor (Rash)  Duricef (Rash)    Intolerances        Vital Signs Last 24 Hrs  T(C): 37.3 (06 Jun 2020 05:00), Max: 37.3 (06 Jun 2020 05:00)  T(F): 99.1 (06 Jun 2020 05:00), Max: 99.1 (06 Jun 2020 05:00)  HR: 82 (06 Jun 2020 09:00) (66 - 131)  BP: 127/69 (06 Jun 2020 09:00) (82/68 - 137/76)  BP(mean): 84 (06 Jun 2020 09:00) (66 - 104)  RR: 29 (06 Jun 2020 09:00) (18 - 29)  SpO2: 100% (06 Jun 2020 09:00) (93% - 100%)  Mode: Spontaneous/ CPAP (Continuous Positive Airway Pressure)  FiO2: 40  PEEP: 5  PS: 10  PIP: 32      PHYSICAL EXAMINATION:    NECK:  Supple. No lymphadenopathy. Jugular venous pressure not elevated. Carotids equal.   HEART:   The cardiac impulse has a normal quality. Reg., Nl S1 and S2.  There are no murmurs, rubs or gallops noted  CHEST:  Chest is clear to auscultation. Normal respiratory effort.  ABDOMEN:  Soft and nontender.   EXTREMITIES:  There is no edema.       LABS:

## 2020-06-07 PROCEDURE — 99232 SBSQ HOSP IP/OBS MODERATE 35: CPT

## 2020-06-07 RX ORDER — HALOPERIDOL DECANOATE 100 MG/ML
2.5 INJECTION INTRAMUSCULAR ONCE
Refills: 0 | Status: COMPLETED | OUTPATIENT
Start: 2020-06-07 | End: 2020-06-07

## 2020-06-07 RX ORDER — QUETIAPINE FUMARATE 200 MG/1
150 TABLET, FILM COATED ORAL AT BEDTIME
Refills: 0 | Status: DISCONTINUED | OUTPATIENT
Start: 2020-06-07 | End: 2020-06-14

## 2020-06-07 RX ORDER — ALPRAZOLAM 0.25 MG
0.5 TABLET ORAL EVERY 6 HOURS
Refills: 0 | Status: DISCONTINUED | OUTPATIENT
Start: 2020-06-07 | End: 2020-06-13

## 2020-06-07 RX ADMIN — Medication 0.5 MILLIGRAM(S): at 17:41

## 2020-06-07 RX ADMIN — Medication 90 MILLIGRAM(S): at 06:04

## 2020-06-07 RX ADMIN — Medication 90 MILLIGRAM(S): at 17:41

## 2020-06-07 RX ADMIN — ENOXAPARIN SODIUM 40 MILLIGRAM(S): 100 INJECTION SUBCUTANEOUS at 06:03

## 2020-06-07 RX ADMIN — CHLORHEXIDINE GLUCONATE 15 MILLILITER(S): 213 SOLUTION TOPICAL at 17:41

## 2020-06-07 RX ADMIN — QUETIAPINE FUMARATE 50 MILLIGRAM(S): 200 TABLET, FILM COATED ORAL at 11:56

## 2020-06-07 RX ADMIN — BUDESONIDE AND FORMOTEROL FUMARATE DIHYDRATE 2 PUFF(S): 160; 4.5 AEROSOL RESPIRATORY (INHALATION) at 08:00

## 2020-06-07 RX ADMIN — Medication 0.5 MILLIGRAM(S): at 06:03

## 2020-06-07 RX ADMIN — Medication 90 MILLIGRAM(S): at 23:00

## 2020-06-07 RX ADMIN — QUETIAPINE FUMARATE 150 MILLIGRAM(S): 200 TABLET, FILM COATED ORAL at 21:05

## 2020-06-07 RX ADMIN — HALOPERIDOL DECANOATE 2.5 MILLIGRAM(S): 100 INJECTION INTRAMUSCULAR at 16:45

## 2020-06-07 RX ADMIN — Medication 2 MILLIGRAM(S): at 21:06

## 2020-06-07 RX ADMIN — ALBUTEROL 2 PUFF(S): 90 AEROSOL, METERED ORAL at 08:11

## 2020-06-07 RX ADMIN — GABAPENTIN 400 MILLIGRAM(S): 400 CAPSULE ORAL at 21:05

## 2020-06-07 RX ADMIN — CLOPIDOGREL BISULFATE 75 MILLIGRAM(S): 75 TABLET, FILM COATED ORAL at 11:56

## 2020-06-07 RX ADMIN — CHLORHEXIDINE GLUCONATE 15 MILLILITER(S): 213 SOLUTION TOPICAL at 06:03

## 2020-06-07 RX ADMIN — BUDESONIDE AND FORMOTEROL FUMARATE DIHYDRATE 2 PUFF(S): 160; 4.5 AEROSOL RESPIRATORY (INHALATION) at 08:12

## 2020-06-07 RX ADMIN — HALOPERIDOL DECANOATE 2.5 MILLIGRAM(S): 100 INJECTION INTRAMUSCULAR at 16:20

## 2020-06-07 RX ADMIN — ALBUTEROL 2 PUFF(S): 90 AEROSOL, METERED ORAL at 00:26

## 2020-06-07 RX ADMIN — PANTOPRAZOLE SODIUM 40 MILLIGRAM(S): 20 TABLET, DELAYED RELEASE ORAL at 11:59

## 2020-06-07 RX ADMIN — Medication 90 MILLIGRAM(S): at 00:54

## 2020-06-07 RX ADMIN — Medication 90 MILLIGRAM(S): at 11:59

## 2020-06-07 RX ADMIN — Medication 0.5 MILLIGRAM(S): at 00:54

## 2020-06-07 RX ADMIN — BUDESONIDE AND FORMOTEROL FUMARATE DIHYDRATE 2 PUFF(S): 160; 4.5 AEROSOL RESPIRATORY (INHALATION) at 20:48

## 2020-06-07 RX ADMIN — Medication 100 MILLIGRAM(S): at 11:57

## 2020-06-07 RX ADMIN — ENOXAPARIN SODIUM 40 MILLIGRAM(S): 100 INJECTION SUBCUTANEOUS at 17:41

## 2020-06-07 RX ADMIN — Medication 81 MILLIGRAM(S): at 11:59

## 2020-06-07 RX ADMIN — Medication 0.5 MILLIGRAM(S): at 23:00

## 2020-06-07 RX ADMIN — ALBUTEROL 2 PUFF(S): 90 AEROSOL, METERED ORAL at 20:55

## 2020-06-07 RX ADMIN — GABAPENTIN 400 MILLIGRAM(S): 400 CAPSULE ORAL at 06:06

## 2020-06-07 RX ADMIN — ALBUTEROL 2 PUFF(S): 90 AEROSOL, METERED ORAL at 03:23

## 2020-06-07 RX ADMIN — Medication 0.5 MILLIGRAM(S): at 11:56

## 2020-06-07 NOTE — PROGRESS NOTE ADULT - ASSESSMENT
IMP:    66 y/o male w chronic AFib w Watchman device, HFpEF , COPD on home O2, HTN, ETOH use and pt of size admitted on 4/18 with COPD exacerbation--RRT called on 4/23 for AMS requiring intubation--extubated on 4/26 then re intubated on 4/29.  S/P Trach and PEG on 5/15 and has failed SBT.  Most recent active issue is persistent MSSA sepsis and CRP in sputum--Completed IV Ancef (6/5) and completed Cipro.  No central access   Chronic resp failure  TM Encephalopathy and agitation--almost resolved    Plan:    SBT as tolerated  IV vanco--stopped on 5/22.  Completed Ancef (15) and completed Cipro.  Repeat BCx 5/29 negative   Xanax to 0.5 q 6  DC'ed haldol   Seroquel and Oxy (QTc < 450)  Cont clopidogrel and ASA for CAD and AFib--Dilt increased to 90 q6   TF to goal  HOB > 30  Started cardura   DVT prophy--SC and LMWH  OOB or sitting position today   Check labs in AM    ICU care--d/w ICU staff on multi disciplinary rounds.  Stable for placement today

## 2020-06-07 NOTE — PROGRESS NOTE ADULT - SUBJECTIVE AND OBJECTIVE BOX
Hospital # 49  ICU # 44  Vent # 22 and Trach and PEG # 22    CC:  Respiratory Failure     HPI:    66 y/o male w chronic AFib w Watchman device, HFpEF , COPD on home O2, HTN, ETOH use and pt of size admitted on 4/18 with COPD exacerbation--RRT called on 4/23 for AMS requiring intubation--extubated on 4/26 then re intubated on 4/29.  S/P Trach and PEG on 5/15 and has failed SBT.  Most recent active issues is persistent MSSA sepsis and CRP in sputum--on IV vanco and Cipro.  No central access      5/22:  Pt seen and examined in ICU--vented-- Encephalopathy.  still MSSA + in Bcx  5/23:  Tolerated PSV 12 x 3 hrs on 5/22.  SBT ongoing now.  Poor mentation.  Repeat BCx sent.  Abx changed to Ancef  5/24:  PSV X 4.5 hrs on 5/23.  Rapid AFib O/N.  Still Encephalopathy.  COVID sent.  5/23 BCx still P.     6/1:  Case d/w Dr horne.  Pt seen and examined in ICU--T+P--attempts to follow commands but overall Encephalopathic.  SBT ongoing.  Tm 99.6  6/2:  No restraints overnight.  Calm.  + BM yesterday.  On vent support.  Stable vitals.  No fever.  Stable for placement   6/3:  No restraints.  Slightly more awake and alert.  On Vent support.  Stable for placement.  Stable vitals and no fevers  6/4:  No restraints. No haldol.  Xanzx.  Awake and following commands and attempting to speak  6/5:  No events overnight.  Able to engage and follow simple commands.  No restraints.  Last day of Ancef  6/6:  No events overnight.  Tolerated SBT 5.5 hrs on 6/5.  Awake.  Follows commands.  Restless but but responsive  6/7:  No events overnight.  Awake and alert.  Lucid.  SBT ongoing     PMH:  As above.     PSH:  As above.     FH: Non Contributory other than those listed in HPI    Social History:  Unobtainable due to clinical condition     MEDICATIONS  (STANDING):  ALBUTerol    90 MICROgram(s) HFA Inhaler 2 Puff(s) Inhalation every 4 hours  ALPRAZolam 0.5 milliGRAM(s) Oral every 6 hours  aspirin  chewable 81 milliGRAM(s) Oral daily  budesonide 160 MICROgram(s)/formoterol 4.5 MICROgram(s) Inhaler 2 Puff(s) Inhalation two times a day  chlorhexidine 0.12% Liquid 15 milliLiter(s) Oral Mucosa every 12 hours  clopidogrel Tablet 75 milliGRAM(s) Oral daily  diltiazem    Tablet 90 milliGRAM(s) Oral every 6 hours  doxazosin 2 milliGRAM(s) Oral at bedtime  enoxaparin Injectable 40 milliGRAM(s) SubCutaneous every 12 hours  gabapentin 400 milliGRAM(s) Oral three times a day  pantoprazole  Injectable 40 milliGRAM(s) IV Push daily  QUEtiapine 50 milliGRAM(s) Oral daily  QUEtiapine 100 milliGRAM(s) Oral at bedtime  thiamine 100 milliGRAM(s) Oral daily  tiotropium 18 MICROgram(s) Capsule 1 Capsule(s) Inhalation daily    MEDICATIONS  (PRN):  acetaminophen   Tablet .. 650 milliGRAM(s) Oral every 6 hours PRN Temp greater or equal to 38.5C (101.3F)  ibuprofen  Tablet. 400 milliGRAM(s) Oral every 6 hours PRN Moderate Pain (4 - 6)      Allergies: NKDA    ROS:  SEE BELOW        ICU Vital Signs Last 24 Hrs  T(C): 36.7 (07 Jun 2020 04:00), Max: 37.3 (06 Jun 2020 12:00)  T(F): 98.1 (07 Jun 2020 04:00), Max: 99.2 (06 Jun 2020 12:00)  HR: 90 (07 Jun 2020 07:00) (68 - 107)  BP: 113/67 (07 Jun 2020 07:00) (103/59 - 152/78)  BP(mean): 77 (07 Jun 2020 07:00) (69 - 94)  ABP: --  ABP(mean): --  RR: 21 (07 Jun 2020 07:00) (16 - 30)  SpO2: 100% (07 Jun 2020 07:00) (92% - 100%)      Mode: Spontaneous/ CPAP (Continuous Positive Airway Pressure)  FiO2: 30  PEEP: 5  PS: 10  MAP: 36      I&O's Summary    06 Jun 2020 07:01  -  07 Jun 2020 07:00  --------------------------------------------------------  IN: 2610 mL / OUT: 850 mL / NET: 1760 mL        Physical Exam:  SEE BELOW                              DVT Prophylaxis:                                                            Contraindication:     Advanced Directives:    Discussed with:    Visit Information:  Time spent excluding procedure:      ** Time is exclusive of billed procedures and/or teaching and/or routine family updates.

## 2020-06-07 NOTE — PROGRESS NOTE ADULT - SUBJECTIVE AND OBJECTIVE BOX
Subjective:    pat better, on trach collar, no distress, awake & alert.    Home Medications:  acetaminophen 325 mg oral tablet: 2 tab(s) orally every 6 hours, As needed, Temp greater or equal to 38.5C (101.3F) (02 Jun 2020 12:53)  albuterol 90 mcg/inh inhalation aerosol: 2 puff(s) inhaled every 4 hours (02 Jun 2020 12:53)  ALPRAZolam 0.5 mg oral tablet: 1 tab(s) orally every 6 hours (02 Jun 2020 12:53)  aspirin 81 mg oral tablet, chewable: 1 tab(s) orally once a day (02 Jun 2020 12:53)  clopidogrel 75 mg oral tablet: 1 tab(s) orally once a day (02 Jun 2020 12:53)  dilTIAZem 90 mg oral tablet: 1 tab(s) orally every 6 hours (02 Jun 2020 12:53)  doxazosin 2 mg oral tablet: 1 tab(s) orally once a day (at bedtime) (02 Jun 2020 12:53)  gabapentin 400 mg oral capsule: 1 cap(s) orally 3 times a day (02 Jun 2020 12:53)  pantoprazole 40 mg oral granule, enteric coated: 40 milligram(s) orally once a day (19 Apr 2020 03:12)  QUEtiapine 100 mg oral tablet: 1 tab(s) orally once a day (at bedtime) (02 Jun 2020 12:53)  QUEtiapine 50 mg oral tablet: 1 tab(s) orally once a day (02 Jun 2020 12:53)  Spiriva 18 mcg inhalation capsule: 1 cap(s) inhaled once a day (19 Apr 2020 03:12)  thiamine 100 mg oral tablet: 1 tab(s) orally once a day (02 Jun 2020 12:53)    MEDICATIONS  (STANDING):  ALBUTerol    90 MICROgram(s) HFA Inhaler 2 Puff(s) Inhalation every 4 hours  ALPRAZolam 0.5 milliGRAM(s) Oral every 6 hours  aspirin  chewable 81 milliGRAM(s) Oral daily  budesonide 160 MICROgram(s)/formoterol 4.5 MICROgram(s) Inhaler 2 Puff(s) Inhalation two times a day  chlorhexidine 0.12% Liquid 15 milliLiter(s) Oral Mucosa every 12 hours  clopidogrel Tablet 75 milliGRAM(s) Oral daily  diltiazem    Tablet 90 milliGRAM(s) Oral every 6 hours  doxazosin 2 milliGRAM(s) Oral at bedtime  enoxaparin Injectable 40 milliGRAM(s) SubCutaneous every 12 hours  gabapentin 400 milliGRAM(s) Oral three times a day  pantoprazole  Injectable 40 milliGRAM(s) IV Push daily  QUEtiapine 50 milliGRAM(s) Oral daily  QUEtiapine 100 milliGRAM(s) Oral at bedtime  thiamine 100 milliGRAM(s) Oral daily  tiotropium 18 MICROgram(s) Capsule 1 Capsule(s) Inhalation daily    MEDICATIONS  (PRN):  acetaminophen   Tablet .. 650 milliGRAM(s) Oral every 6 hours PRN Temp greater or equal to 38.5C (101.3F)  ibuprofen  Tablet. 400 milliGRAM(s) Oral every 6 hours PRN Moderate Pain (4 - 6)      Allergies    Ceclor (Rash)  Duricef (Rash)    Intolerances        Vital Signs Last 24 Hrs  T(C): 36.7 (07 Jun 2020 04:00), Max: 37.3 (06 Jun 2020 12:00)  T(F): 98.1 (07 Jun 2020 04:00), Max: 99.2 (06 Jun 2020 12:00)  HR: 97 (07 Jun 2020 10:00) (68 - 107)  BP: 115/56 (07 Jun 2020 10:00) (97/74 - 152/78)  BP(mean): 69 (07 Jun 2020 10:00) (69 - 94)  RR: 26 (07 Jun 2020 10:00) (16 - 30)  SpO2: 100% (07 Jun 2020 10:00) (92% - 100%)  Mode: standby      PHYSICAL EXAMINATION:    NECK:  Supple. No lymphadenopathy. Jugular venous pressure not elevated. Carotids equal.   HEART:   The cardiac impulse has a normal quality. Reg., Nl S1 and S2.  There are no murmurs, rubs or gallops noted  CHEST:  Chest crackles to auscultation. Normal respiratory effort.  ABDOMEN:  Soft and nontender.   EXTREMITIES:  There is no edema.       LABS:

## 2020-06-07 NOTE — PROGRESS NOTE ADULT - ASSESSMENT
PROBLEMS;    s/p staph bactermia/sputum pseudomonas/staph  Ac on chronic hypercapnic & hypoxamic respiratory failure-s/p trach & PEG  sputum-Few Pseudomonas aeruginosa (Carbapenem Resistant)/Moderate Methicillin resistant Staphylococcus aureus  afib with RVR  OHS/VIJAY  AC flare of COPD/chronic vs acute on chronic bronchitis  Mild interstitial lung disease-NSIP h/o smoking  COVID negativex2  Pulmonary hypertension  Nonobstructing stone in the left ureterovesicular junction/ nonobstructing stone in the lower pole right kidney.  Subacute hemorrhage in the right rectus abdominis along the anterior sheath, measures 1.6 cm in thickness and extends from the umbilicus to the inferior myotendinous junction  Cirrhosis  Mild splenomegaly-portal venous hypertension.  Chronic afib w Watchman device  CHF  BPH  GERD  ETOH abuse  seizures disorder    PLAN;    pulmonary slow recovery- waiting placement for rehab  s/p trach on trach collar as tolerated  off iv ancef for bactermia  Tube feeding as tolerated  supportive care  covid repeat testing-neg  Eliquis/asa 81  d/w staff

## 2020-06-08 ENCOUNTER — APPOINTMENT (OUTPATIENT)
Dept: THORACIC SURGERY | Facility: HOSPITAL | Age: 65
End: 2020-06-08

## 2020-06-08 LAB
ANION GAP SERPL CALC-SCNC: 5 MMOL/L — SIGNIFICANT CHANGE UP (ref 5–17)
BUN SERPL-MCNC: 16 MG/DL — SIGNIFICANT CHANGE UP (ref 7–23)
CALCIUM SERPL-MCNC: 9.1 MG/DL — SIGNIFICANT CHANGE UP (ref 8.5–10.1)
CHLORIDE SERPL-SCNC: 105 MMOL/L — SIGNIFICANT CHANGE UP (ref 96–108)
CO2 SERPL-SCNC: 27 MMOL/L — SIGNIFICANT CHANGE UP (ref 22–31)
CREAT SERPL-MCNC: 0.49 MG/DL — LOW (ref 0.5–1.3)
GLUCOSE SERPL-MCNC: 104 MG/DL — HIGH (ref 70–99)
HCT VFR BLD CALC: 29.6 % — LOW (ref 39–50)
HGB BLD-MCNC: 9.6 G/DL — LOW (ref 13–17)
MAGNESIUM SERPL-MCNC: 1.5 MG/DL — LOW (ref 1.6–2.6)
MCHC RBC-ENTMCNC: 31.1 PG — SIGNIFICANT CHANGE UP (ref 27–34)
MCHC RBC-ENTMCNC: 32.4 GM/DL — SIGNIFICANT CHANGE UP (ref 32–36)
MCV RBC AUTO: 95.8 FL — SIGNIFICANT CHANGE UP (ref 80–100)
PHOSPHATE SERPL-MCNC: 4.6 MG/DL — HIGH (ref 2.5–4.5)
PLATELET # BLD AUTO: 223 K/UL — SIGNIFICANT CHANGE UP (ref 150–400)
POTASSIUM SERPL-MCNC: 3.8 MMOL/L — SIGNIFICANT CHANGE UP (ref 3.5–5.3)
POTASSIUM SERPL-SCNC: 3.8 MMOL/L — SIGNIFICANT CHANGE UP (ref 3.5–5.3)
RBC # BLD: 3.09 M/UL — LOW (ref 4.2–5.8)
RBC # FLD: 14.2 % — SIGNIFICANT CHANGE UP (ref 10.3–14.5)
SODIUM SERPL-SCNC: 137 MMOL/L — SIGNIFICANT CHANGE UP (ref 135–145)
WBC # BLD: 4.63 K/UL — SIGNIFICANT CHANGE UP (ref 3.8–10.5)
WBC # FLD AUTO: 4.63 K/UL — SIGNIFICANT CHANGE UP (ref 3.8–10.5)

## 2020-06-08 PROCEDURE — 99232 SBSQ HOSP IP/OBS MODERATE 35: CPT

## 2020-06-08 PROCEDURE — 31502 CHANGE OF WINDPIPE AIRWAY: CPT

## 2020-06-08 RX ORDER — HALOPERIDOL DECANOATE 100 MG/ML
2.5 INJECTION INTRAMUSCULAR ONCE
Refills: 0 | Status: COMPLETED | OUTPATIENT
Start: 2020-06-08 | End: 2020-06-08

## 2020-06-08 RX ORDER — MAGNESIUM OXIDE 400 MG ORAL TABLET 241.3 MG
400 TABLET ORAL ONCE
Refills: 0 | Status: COMPLETED | OUTPATIENT
Start: 2020-06-08 | End: 2020-06-08

## 2020-06-08 RX ORDER — FAMOTIDINE 10 MG/ML
20 INJECTION INTRAVENOUS
Refills: 0 | Status: DISCONTINUED | OUTPATIENT
Start: 2020-06-08 | End: 2020-07-08

## 2020-06-08 RX ORDER — PROPOFOL 10 MG/ML
80 INJECTION, EMULSION INTRAVENOUS ONCE
Refills: 0 | Status: COMPLETED | OUTPATIENT
Start: 2020-06-08 | End: 2020-06-08

## 2020-06-08 RX ORDER — MIDAZOLAM HYDROCHLORIDE 1 MG/ML
4 INJECTION, SOLUTION INTRAMUSCULAR; INTRAVENOUS ONCE
Refills: 0 | Status: DISCONTINUED | OUTPATIENT
Start: 2020-06-08 | End: 2020-06-08

## 2020-06-08 RX ADMIN — Medication 90 MILLIGRAM(S): at 12:15

## 2020-06-08 RX ADMIN — BUDESONIDE AND FORMOTEROL FUMARATE DIHYDRATE 2 PUFF(S): 160; 4.5 AEROSOL RESPIRATORY (INHALATION) at 20:58

## 2020-06-08 RX ADMIN — ENOXAPARIN SODIUM 40 MILLIGRAM(S): 100 INJECTION SUBCUTANEOUS at 05:43

## 2020-06-08 RX ADMIN — Medication 81 MILLIGRAM(S): at 12:15

## 2020-06-08 RX ADMIN — MIDAZOLAM HYDROCHLORIDE 4 MILLIGRAM(S): 1 INJECTION, SOLUTION INTRAMUSCULAR; INTRAVENOUS at 09:07

## 2020-06-08 RX ADMIN — GABAPENTIN 400 MILLIGRAM(S): 400 CAPSULE ORAL at 21:29

## 2020-06-08 RX ADMIN — Medication 0.5 MILLIGRAM(S): at 18:24

## 2020-06-08 RX ADMIN — Medication 100 MILLIGRAM(S): at 12:15

## 2020-06-08 RX ADMIN — GABAPENTIN 400 MILLIGRAM(S): 400 CAPSULE ORAL at 15:11

## 2020-06-08 RX ADMIN — CHLORHEXIDINE GLUCONATE 15 MILLILITER(S): 213 SOLUTION TOPICAL at 05:43

## 2020-06-08 RX ADMIN — FAMOTIDINE 20 MILLIGRAM(S): 10 INJECTION INTRAVENOUS at 21:29

## 2020-06-08 RX ADMIN — PROPOFOL 80 MILLIGRAM(S): 10 INJECTION, EMULSION INTRAVENOUS at 09:13

## 2020-06-08 RX ADMIN — CHLORHEXIDINE GLUCONATE 15 MILLILITER(S): 213 SOLUTION TOPICAL at 18:25

## 2020-06-08 RX ADMIN — ALBUTEROL 2 PUFF(S): 90 AEROSOL, METERED ORAL at 20:58

## 2020-06-08 RX ADMIN — ALBUTEROL 2 PUFF(S): 90 AEROSOL, METERED ORAL at 08:09

## 2020-06-08 RX ADMIN — CLOPIDOGREL BISULFATE 75 MILLIGRAM(S): 75 TABLET, FILM COATED ORAL at 12:15

## 2020-06-08 RX ADMIN — FAMOTIDINE 20 MILLIGRAM(S): 10 INJECTION INTRAVENOUS at 12:20

## 2020-06-08 RX ADMIN — Medication 2 MILLIGRAM(S): at 21:29

## 2020-06-08 RX ADMIN — QUETIAPINE FUMARATE 100 MILLIGRAM(S): 200 TABLET, FILM COATED ORAL at 12:15

## 2020-06-08 RX ADMIN — ALBUTEROL 2 PUFF(S): 90 AEROSOL, METERED ORAL at 14:16

## 2020-06-08 RX ADMIN — Medication 90 MILLIGRAM(S): at 18:24

## 2020-06-08 RX ADMIN — MAGNESIUM OXIDE 400 MG ORAL TABLET 400 MILLIGRAM(S): 241.3 TABLET ORAL at 12:14

## 2020-06-08 RX ADMIN — GABAPENTIN 400 MILLIGRAM(S): 400 CAPSULE ORAL at 05:43

## 2020-06-08 RX ADMIN — Medication 90 MILLIGRAM(S): at 05:43

## 2020-06-08 RX ADMIN — ENOXAPARIN SODIUM 40 MILLIGRAM(S): 100 INJECTION SUBCUTANEOUS at 18:23

## 2020-06-08 RX ADMIN — Medication 0.5 MILLIGRAM(S): at 12:14

## 2020-06-08 RX ADMIN — ALBUTEROL 2 PUFF(S): 90 AEROSOL, METERED ORAL at 03:15

## 2020-06-08 RX ADMIN — QUETIAPINE FUMARATE 150 MILLIGRAM(S): 200 TABLET, FILM COATED ORAL at 21:28

## 2020-06-08 RX ADMIN — ALBUTEROL 2 PUFF(S): 90 AEROSOL, METERED ORAL at 00:14

## 2020-06-08 RX ADMIN — HALOPERIDOL DECANOATE 2.5 MILLIGRAM(S): 100 INJECTION INTRAMUSCULAR at 00:05

## 2020-06-08 RX ADMIN — Medication 0.5 MILLIGRAM(S): at 05:43

## 2020-06-08 NOTE — PROGRESS NOTE ADULT - ASSESSMENT
PROBLEMS;    s/p staph bactermia/sputum pseudomonas/staph  Ac on chronic hypercapnic & hypoxamic respiratory failure-s/p trach & PEG  sputum-Few Pseudomonas aeruginosa (Carbapenem Resistant)/Moderate Methicillin resistant Staphylococcus aureus  afib with RVR  OHS/VIJAY  AC flare of COPD/chronic vs acute on chronic bronchitis  Mild interstitial lung disease-NSIP h/o smoking  COVID negativex2  Pulmonary hypertension  Nonobstructing stone in the left ureterovesicular junction/ nonobstructing stone in the lower pole right kidney.  Subacute hemorrhage in the right rectus abdominis along the anterior sheath, measures 1.6 cm in thickness and extends from the umbilicus to the inferior myotendinous junction  Cirrhosis  Mild splenomegaly-portal venous hypertension.  Chronic afib w Watchman device  CHF  BPH  GERD  ETOH abuse  seizures disorder    PLAN;    pulmonary slow recovery- waiting placement for rehab  s/p trach on trach collar as tolerated  off abx  Tube feeding as tolerated  supportive care  covid repeat testing-neg  Eliquis/asa 81  d/w staff

## 2020-06-08 NOTE — PROGRESS NOTE ADULT - ASSESSMENT
66 y/o M admitted on 4/18 with COPD exacerbation -- RRT called on 4/23 for AMS requiring intubation  extubated on 4/26 then re intubated on 4/29.    S/P Trach and PEG on 5/15 and has failed SBT.    Most recent active issues is persistent MSSA sepsis and CRP in sputum-- s/p IV vanco and Cipro.  No central access    transitioned to Ancef which completed on 6/6 (14 days from time of 1st neg culture)  COVID Negative on 5/24, COVID-19 on 5/30 was NEGATIVE - in prep for 6/1 discharge    pt continues to suffer from:  chronic resp failure requiring ventilator support  TM Encephalopathy with restlessness/agitation  delirium  concern for endocarditis as cause of bacteremia with MSSA - NO evidence of vegetations on MYA 5/26.  Blood cultures 5/23 & 5/29:  No growth  atrial fibrillation RVR - better controlled at present  urinary retention - resolved with Cardura      PMHx - chronic AFib w Watchman device, CHF, COPD, HTN, obesity     Plan at this time is for discharge to NH/rehab when appropriate,  VAP prevention bundle  SBT as tolerated daily +/- TC if re remains stable  Seroquel - titrate to control restlessness / agitation as possible  Xanax 0.5g q 12  Cont ASA for CAD and AFib-- Diltiazem 90mg q6   Plavix    stopped (5/26) Apixiban - No need with Watchman device  chemical VTE prophylaxis Lovenox 40mg SC q12h based on BMI  Increase Seroquel to 150 mg HS and 100 mg daily  HOB > 30  Continue Cardura for urinary retention    Supportive care  ICU care--d/w ICU staff on multi disciplinary rounds  Discussed with social work - discharge planning.    Spoke with daughter who is aware of issues/plans and pending discharge to NH.    Attending Attestation:   30 minutes spent on total encounter; more than 50% of the visit was spent counseling and/or coordinating care by the attending physician.

## 2020-06-08 NOTE — PROGRESS NOTE ADULT - SUBJECTIVE AND OBJECTIVE BOX
Allergies    Ceclor (Rash)  Duricef (Rash)    ICU Vital Signs Last 24 Hrs  T(C): 37.4 (08 Jun 2020 06:00), Max: 37.7 (08 Jun 2020 00:00)  T(F): 99.4 (08 Jun 2020 06:00), Max: 99.8 (08 Jun 2020 00:00)  HR: 82 (08 Jun 2020 08:33) (82 - 129)  BP: 117/56 (08 Jun 2020 07:00) (98/79 - 149/101)  BP(mean): 71 (08 Jun 2020 07:00) (68 - 113)  RR: 30 (08 Jun 2020 07:00) (19 - 33)  SpO2: 100% (08 Jun 2020 08:33) (95% - 100%)      Mode: AC/ CMV (Assist Control/ Continuous Mandatory Ventilation)  RR (machine): 15  TV (machine): 450  FiO2: 30  PEEP: 5  PS: 10  ITime: 1  PIP: 29      I&O's Summary    07 Jun 2020 07:01  -  08 Jun 2020 07:00  --------------------------------------------------------  IN: 1521 mL / OUT: 1400 mL / NET: 121 mL                              9.6    4.63  )-----------( 223      ( 08 Jun 2020 06:48 )             29.6       06-08    137  |  105  |  16  ----------------------------<  104<H>  3.8   |  27  |  0.49<L>    Ca    9.1      08 Jun 2020 06:48  Phos  4.6     06-08  Mg     1.5     06-08        CAPILLARY BLOOD GLUCOSE                                MEDICATIONS  (STANDING):  ALBUTerol    90 MICROgram(s) HFA Inhaler 2 Puff(s) Inhalation every 4 hours  ALPRAZolam 0.5 milliGRAM(s) Oral every 6 hours  aspirin  chewable 81 milliGRAM(s) Oral daily  budesonide 160 MICROgram(s)/formoterol 4.5 MICROgram(s) Inhaler 2 Puff(s) Inhalation two times a day  chlorhexidine 0.12% Liquid 15 milliLiter(s) Oral Mucosa every 12 hours  clopidogrel Tablet 75 milliGRAM(s) Oral daily  diltiazem    Tablet 90 milliGRAM(s) Oral every 6 hours  doxazosin 2 milliGRAM(s) Oral at bedtime  enoxaparin Injectable 40 milliGRAM(s) SubCutaneous every 12 hours  famotidine    Tablet 20 milliGRAM(s) Oral two times a day  gabapentin 400 milliGRAM(s) Oral three times a day  midazolam Injectable 4 milliGRAM(s) IV Push once  QUEtiapine 100 milliGRAM(s) Oral daily  QUEtiapine 150 milliGRAM(s) Oral at bedtime  thiamine 100 milliGRAM(s) Oral daily  tiotropium 18 MICROgram(s) Capsule 1 Capsule(s) Inhalation daily    MEDICATIONS  (PRN):  acetaminophen   Tablet .. 650 milliGRAM(s) Oral every 6 hours PRN Temp greater or equal to 38.5C (101.3F)  ibuprofen  Tablet. 400 milliGRAM(s) Oral every 6 hours PRN Moderate Pain (4 - 6)          DVT Prophylaxis:    Advanced Directives:  Discussed with:    Visit Information:          minutes of Critical Care time spent providing medical care for patient's acute illness/conditions that impairs at least one vital organ system and/or poses a high risk of imminent or life threatening deterioration in the patient's condition.  It includes time spent reviewing labs, radiology, discussing goals of care with patient and/or family, and discussing the case with a multidisciplinary team in an effort to prevent further life threatening deterioration or end organ damage.  This time is independent of any procedures performed.    ** Time is exclusive of billed procedures and/or teaching and/or routine family updates. Hospital # 50  ICU # 45    CC:  Respiratory Failure     64 y/o M admitted on 4/18 with COPD exacerbation -- RRT called on 4/23 for AMS requiring intubation  extubated on 4/26 then re intubated on 4/29.    S/P Trach and PEG on 5/15 and has failed SBT.    Most recent active issues is persistent MSSA sepsis and CRP in sputum-- treated with IV vanco and Cipro.  No central access      5/22:  remains intubated, encephalopathy.  (+) MSSA + in Bcx  5/23:  Tolerating limited PSV - persistent poor mentation.  Repeat BCx sent.  Abx changed to Ancef  5/24:  Rapid AFib O/N.  Still Encephalopathy.  COVID sent.  5/23 BCx still P.    5/25: cultures from 5/23 remain NGTD - COVID swab again negative on 5/24.    5/26: unrevealing MYA - NO evidence of vegetations.    Case and in particular, last 7 days discussed with Dr Dorado who has been caring for the patient.    6/8: Pt recent hospital course has been complicated by encephalopathy and delirium that limit his ability for placement in appropriate rehab or nursing facilities.  Despite attempts at non-pharmacologic interventions coupled with Xanax, haldol, and Seroquel,  pt still requiring restraints much of the time to prevent dislodging of his trach and/or PEG and potential harm to self.  Trach changed today by Thoracic sx team.  TF in progress.      PMHx - chronic AFib w Watchman device, CHF, COPD, HTN, obesity       Allergies    Ceclor (Rash)  Duricef (Rash)    ICU Vital Signs Last 24 Hrs  T(C): 37.4 (08 Jun 2020 06:00), Max: 37.7 (08 Jun 2020 00:00)  T(F): 99.4 (08 Jun 2020 06:00), Max: 99.8 (08 Jun 2020 00:00)  HR: 82 (08 Jun 2020 08:33) (82 - 129)  BP: 117/56 (08 Jun 2020 07:00) (98/79 - 149/101)  BP(mean): 71 (08 Jun 2020 07:00) (68 - 113)  RR: 30 (08 Jun 2020 07:00) (19 - 33)  SpO2: 100% (08 Jun 2020 08:33) (95% - 100%)      Mode: AC/ CMV (Assist Control/ Continuous Mandatory Ventilation)  RR (machine): 15  TV (machine): 450  FiO2: 30  PEEP: 5  PS: 10  ITime: 1  PIP: 29      I&O's Summary    07 Jun 2020 07:01  -  08 Jun 2020 07:00  --------------------------------------------------------  IN: 1521 mL / OUT: 1400 mL / NET: 121 mL                              9.6    4.63  )-----------( 223      ( 08 Jun 2020 06:48 )             29.6       06-08    137  |  105  |  16  ----------------------------<  104<H>  3.8   |  27  |  0.49<L>    Ca    9.1      08 Jun 2020 06:48  Phos  4.6     06-08  Mg     1.5     06-08      MEDICATIONS  (STANDING):  ALBUTerol    90 MICROgram(s) HFA Inhaler 2 Puff(s) Inhalation every 4 hours  ALPRAZolam 0.5 milliGRAM(s) Oral every 6 hours  aspirin  chewable 81 milliGRAM(s) Oral daily  budesonide 160 MICROgram(s)/formoterol 4.5 MICROgram(s) Inhaler 2 Puff(s) Inhalation two times a day  chlorhexidine 0.12% Liquid 15 milliLiter(s) Oral Mucosa every 12 hours  clopidogrel Tablet 75 milliGRAM(s) Oral daily  diltiazem    Tablet 90 milliGRAM(s) Oral every 6 hours  doxazosin 2 milliGRAM(s) Oral at bedtime  enoxaparin Injectable 40 milliGRAM(s) SubCutaneous every 12 hours  famotidine    Tablet 20 milliGRAM(s) Oral two times a day  gabapentin 400 milliGRAM(s) Oral three times a day  midazolam Injectable 4 milliGRAM(s) IV Push once  QUEtiapine 100 milliGRAM(s) Oral daily  QUEtiapine 150 milliGRAM(s) Oral at bedtime  thiamine 100 milliGRAM(s) Oral daily  tiotropium 18 MICROgram(s) Capsule 1 Capsule(s) Inhalation daily    MEDICATIONS  (PRN):  acetaminophen   Tablet .. 650 milliGRAM(s) Oral every 6 hours PRN Temp greater or equal to 38.5C (101.3F)  ibuprofen  Tablet. 400 milliGRAM(s) Oral every 6 hours PRN Moderate Pain (4 - 6)      Review of Systems:   · Additional ROS	Unobtainable due to clinical condition	    Physical Exam:   · Constitutional	detailed exam	  · Constitutional Details	ill; restless but poorly cooperative	  · ENMT	detailed exam	  · ENMT Comments	Trach in situ connected to Vent	  · Respiratory	detailed exam	  · Respiratory Details	breath sounds equal B/L; NO sig W/R/R	  · Cardiovascular	detailed exam	  · Cardiovascular Details	irregular rate and rhythm	  · Gastrointestinal	detailed exam	  · GI Normal	soft; NT/ND (+) BS	  · GI Abnormal	+ PEG in situ; Site is CDI	  · Extremities	detailed exam	  · Extremities Details	no cyanosis; LLE BKA	  · Neurological	detailed exam	  · Neurological Details	Encephalopathy persists; minimally interactive; MARIE spont NO gross motor or sensory deficits	  · Skin	detailed exam	  · Skin Details	warm and dry	        DVT Prophylaxis: Lovenox q12h, venodynes    Visit Information:  SSQ

## 2020-06-08 NOTE — PROGRESS NOTE ADULT - SUBJECTIVE AND OBJECTIVE BOX
Subjective:    pat s/p trach changed, on vent, s/p bronchoscopy to check trach location, no distress lying in bed.    Home Medications:  acetaminophen 325 mg oral tablet: 2 tab(s) orally every 6 hours, As needed, Temp greater or equal to 38.5C (101.3F) (02 Jun 2020 12:53)  albuterol 90 mcg/inh inhalation aerosol: 2 puff(s) inhaled every 4 hours (02 Jun 2020 12:53)  ALPRAZolam 0.5 mg oral tablet: 1 tab(s) orally every 6 hours (02 Jun 2020 12:53)  aspirin 81 mg oral tablet, chewable: 1 tab(s) orally once a day (02 Jun 2020 12:53)  clopidogrel 75 mg oral tablet: 1 tab(s) orally once a day (02 Jun 2020 12:53)  dilTIAZem 90 mg oral tablet: 1 tab(s) orally every 6 hours (02 Jun 2020 12:53)  doxazosin 2 mg oral tablet: 1 tab(s) orally once a day (at bedtime) (02 Jun 2020 12:53)  gabapentin 400 mg oral capsule: 1 cap(s) orally 3 times a day (02 Jun 2020 12:53)  pantoprazole 40 mg oral granule, enteric coated: 40 milligram(s) orally once a day (19 Apr 2020 03:12)  QUEtiapine 100 mg oral tablet: 1 tab(s) orally once a day (at bedtime) (02 Jun 2020 12:53)  QUEtiapine 50 mg oral tablet: 1 tab(s) orally once a day (02 Jun 2020 12:53)  Spiriva 18 mcg inhalation capsule: 1 cap(s) inhaled once a day (19 Apr 2020 03:12)  thiamine 100 mg oral tablet: 1 tab(s) orally once a day (02 Jun 2020 12:53)    MEDICATIONS  (STANDING):  ALBUTerol    90 MICROgram(s) HFA Inhaler 2 Puff(s) Inhalation every 4 hours  ALPRAZolam 0.5 milliGRAM(s) Oral every 6 hours  aspirin  chewable 81 milliGRAM(s) Oral daily  budesonide 160 MICROgram(s)/formoterol 4.5 MICROgram(s) Inhaler 2 Puff(s) Inhalation two times a day  chlorhexidine 0.12% Liquid 15 milliLiter(s) Oral Mucosa every 12 hours  clopidogrel Tablet 75 milliGRAM(s) Oral daily  diltiazem    Tablet 90 milliGRAM(s) Oral every 6 hours  doxazosin 2 milliGRAM(s) Oral at bedtime  enoxaparin Injectable 40 milliGRAM(s) SubCutaneous every 12 hours  famotidine    Tablet 20 milliGRAM(s) Oral two times a day  gabapentin 400 milliGRAM(s) Oral three times a day  QUEtiapine 100 milliGRAM(s) Oral daily  QUEtiapine 150 milliGRAM(s) Oral at bedtime  thiamine 100 milliGRAM(s) Oral daily  tiotropium 18 MICROgram(s) Capsule 1 Capsule(s) Inhalation daily    MEDICATIONS  (PRN):  acetaminophen   Tablet .. 650 milliGRAM(s) Oral every 6 hours PRN Temp greater or equal to 38.5C (101.3F)  ibuprofen  Tablet. 400 milliGRAM(s) Oral every 6 hours PRN Moderate Pain (4 - 6)      Allergies    Ceclor (Rash)  Duricef (Rash)    Intolerances        Vital Signs Last 24 Hrs  T(C): 36.9 (08 Jun 2020 12:00), Max: 37.7 (08 Jun 2020 00:00)  T(F): 98.4 (08 Jun 2020 12:00), Max: 99.8 (08 Jun 2020 00:00)  HR: 85 (08 Jun 2020 12:00) (82 - 129)  BP: 119/86 (08 Jun 2020 12:00) (98/79 - 149/101)  BP(mean): 92 (08 Jun 2020 12:00) (70 - 113)  RR: 21 (08 Jun 2020 12:00) (19 - 36)  SpO2: 97% (08 Jun 2020 12:00) (95% - 100%)  Mode: AC/ CMV (Assist Control/ Continuous Mandatory Ventilation)  RR (machine): 15  TV (machine): 450  FiO2: 30  PEEP: 5  PS: 10  ITime: 1  PIP: 29      PHYSICAL EXAMINATION:    NECK:  Supple. No lymphadenopathy. Jugular venous pressure not elevated. Carotids equal.   HEART:   The cardiac impulse has a normal quality. Reg., Nl S1 and S2.  There are no murmurs, rubs or gallops noted  CHEST:  Chest few rhonchi to auscultation. Normal respiratory effort.  ABDOMEN:  Soft and nontender.   EXTREMITIES:  There is no edema.       LABS:                        9.6    4.63  )-----------( 223      ( 08 Jun 2020 06:48 )             29.6     06-08    137  |  105  |  16  ----------------------------<  104<H>  3.8   |  27  |  0.49<L>    Ca    9.1      08 Jun 2020 06:48  Phos  4.6     06-08  Mg     1.5     06-08

## 2020-06-08 NOTE — PROCEDURE NOTE - PROCEDURE DATE TIME, MLM
After Visit Summary   6/7/2017    Francois Lynn    MRN: [id number]           Patient Information     Date Of Birth          1960        Visit Information        Provider Department      6/7/2017 3:00 PM Belkys Stout,  [facility]        Today's Diagnoses     Gastroesophageal reflux disease, esophagitis presence not specified    -  1    Routine history and physical examination of adult        Encounter for screening mammogram for breast cancer        Menopausal syndrome (hot flashes)        Recurrent cold sores        HSV (herpes simplex virus) infection        Herniated intervertebral disc of lumbar spine          Care Instructions      Preventive Health Recommendations  Female Ages 50 - 64    Yearly exam: See your health care provider every year in order to  o Review health changes.   o Discuss preventive care.    o Review your medicines if your doctor has prescribed any.      Get a Pap test every three years (unless you have an abnormal result and your provider advises testing more often).    If you get Pap tests with HPV test, you only need to test every 5 years, unless you have an abnormal result.     You do not need a Pap test if your uterus was removed (hysterectomy) and you have not had cancer.    You should be tested each year for STDs (sexually transmitted diseases) if you're at risk.     Have a mammogram every 1 to 2 years.    Have a colonoscopy at age 50, or have a yearly FIT test (stool test). These exams screen for colon cancer.      Have a cholesterol test every 5 years, or more often if advised.    Have a diabetes test (fasting glucose) every three years. If you are at risk for diabetes, you should have this test more often.     If you are at risk for osteoporosis (brittle bone disease), think about having a bone density scan (DEXA).    Shots: Get a flu shot each year. Get a tetanus shot every 10 years.    Nutrition:     Eat at least 5 servings of fruits and 
vegetables each day.    Eat whole-grain bread, whole-wheat pasta and brown rice instead of white grains and rice.    Talk to your provider about Calcium and Vitamin D.     Lifestyle    Exercise at least 150 minutes a week (30 minutes a day, 5 days a week). This will help you control your weight and prevent disease.    Limit alcohol to one drink per day.    No smoking.     Wear sunscreen to prevent skin cancer.     See your dentist every six months for an exam and cleaning.    See your eye doctor every 1 to 2 years.            Follow-ups after your visit        Future tests that were ordered for you today     Open Future Orders        Priority Expected Expires Ordered    Lipid panel reflex to direct LDL Routine  7/7/2017 6/7/2017    Comprehensive metabolic panel (BMP + Alb, Alk Phos, ALT, AST, Total. Bili, TP) Routine  7/7/2017 6/7/2017    *MA Screening Digital Bilateral Routine  6/7/2018 6/7/2017            Who to contact     If you have questions or need follow up information about today's clinic visit or your schedule please contact [facility] directly at [phone].  Normal or non-critical lab and imaging results will be communicated to you by Hello Universehart, letter or phone within 4 business days after the clinic has received the results. If you do not hear from us within 7 days, please contact the clinic through Paytrailt or phone. If you have a critical or abnormal lab result, we will notify you by phone as soon as possible.  Submit refill requests through FindThatCourse or call your pharmacy and they will forward the refill request to us. Please allow 3 business days for your refill to be completed.          Additional Information About Your Visit        Hello UniverseharGeneriMed Information     FindThatCourse gives you secure access to your electronic health record. If you see a primary care provider, you can also send messages to your care team and make appointments. If you have questions, please call your primary care clinic. 
" If you do not have a primary care provider, please call [phone] and they will assist you.        Care EveryWhere ID     This is your Care EveryWhere ID. This could be used by other organizations to access your [place] medical records  [id number]        Your Vitals Were     Pulse Temperature Respirations Height Pulse Oximetry BMI (Body Mass Index)    90 98.2  F (36.8  C) (Oral) 12 5' 1\" (1.549 m) 98% 33.07 kg/m2       Blood Pressure from Last 3 Encounters:   06/07/17 120/80   06/09/14 151/85   04/18/14 146/88    Weight from Last 3 Encounters:   06/07/17 175 lb (79.4 kg)   04/18/14 158 lb (71.7 kg)   06/22/12 171 lb (77.6 kg)                 Today's Medication Changes          These changes are accurate as of: 6/7/17  3:32 PM.  If you have any questions, ask your nurse or doctor.               Start taking these medicines.        Dose/Directions    omeprazole 40 MG capsule   Commonly known as:  priLOSEC   Used for:  Gastroesophageal reflux disease, esophagitis presence not specified   Started by:  Belkys Stout DO        Dose:  40 mg   Take 1 capsule (40 mg) by mouth daily Take 30-60 minutes before a meal.   Quantity:  90 capsule   Refills:  1       ranitidine 150 MG tablet   Commonly known as:  ZANTAC   Used for:  Gastroesophageal reflux disease, esophagitis presence not specified   Started by:  Belkys Stout DO        Dose:  150 mg   Take 1 tablet (150 mg) by mouth 2 times daily   Quantity:  180 tablet   Refills:  1         Stop taking these medicines if you haven't already. Please contact your care team if you have questions.     albuterol (2.5 MG/3ML) 0.083% neb solution   Stopped by:  Belkys Stout DO           azithromycin 250 MG tablet   Commonly known as:  ZITHROMAX   Stopped by:  Belkys Stout DO           HYDROcodone-acetaminophen 5-325 MG per tablet   Commonly known as:  NORCO   Stopped by:  Belkys Stout DO           order for DME   Stopped by:  Belkys Stout DO "
29-Apr-2020 06:30
          predniSONE 20 MG tablet   Commonly known as:  DELTASONE   Stopped by:  Belkys Stout, DO                Where to get your medicines      These medications were sent to [facility] [place], [place] - [address]  [address] [facility] [address]     Phone:  [phone]     omeprazole 40 MG capsule    ranitidine 150 MG tablet                Primary Care Provider Office Phone # Fax #    Naveed Johnson [phone]       [facility] [address]  [facility] [address]        Thank you!     Thank you for choosing [facility]  for your care. Our goal is always to provide you with excellent care. Hearing back from our patients is one way we can continue to improve our services. Please take a few minutes to complete the written survey that you may receive in the mail after your visit with us. Thank you!             Your Updated Medication List - Protect others around you: Learn how to safely use, store and throw away your medicines at www.disposemymeds.org.          This list is accurate as of: 6/7/17  3:32 PM.  Always use your most recent med list.                   Brand Name Dispense Instructions for use    acyclovir 400 MG tablet    ZOVIRAX    15 tablet    TAKE ONE TABLET BY MOUTH THREE TIMES A DAY       CALCIUM + D 500-1000-40 MG-UNT-MCG Chew   Generic drug:  Calcium-Vitamin D-Vitamin K          estrogen conj-medroxyPROGESTERone 0.45-1.5 MG per tablet    Prempro     Take 1 tablet by mouth daily Prescribed by OB/Gyn       MULTIVITAL Tabs     30 tablet        omeprazole 40 MG capsule    priLOSEC    90 capsule    Take 1 capsule (40 mg) by mouth daily Take 30-60 minutes before a meal.       ranitidine 150 MG tablet    ZANTAC    180 tablet    Take 1 tablet (150 mg) by mouth 2 times daily         
08-Jun-2020
28-Apr-2020 13:27

## 2020-06-08 NOTE — PROGRESS NOTE ADULT - SUBJECTIVE AND OBJECTIVE BOX
Subjective:  RN states possible cuff leak from tracheostomy.  Patient is very agitated and damaged end of balloon.    Vital Signs:  Vital Signs Last 24 Hrs  T(C): 37.4 (06-08-20 @ 06:00), Max: 37.7 (06-08-20 @ 00:00)  T(F): 99.4 (06-08-20 @ 06:00), Max: 99.8 (06-08-20 @ 00:00)  HR: 82 (06-08-20 @ 08:33) (82 - 129)  BP: 117/56 (06-08-20 @ 07:00) (98/79 - 149/101)  RR: 30 (06-08-20 @ 07:00) (19 - 33)  SpO2: 100% (06-08-20 @ 08:33) (95% - 100%) on 50% PEEP 5    Telemetry/Alarms: atrial fibrillation    Relevant labs, radiology and Medications reviewed                        9.6    4.63  )-----------( 223      ( 08 Jun 2020 06:48 )             29.6     06-08    137  |  105  |  16  ----------------------------<  104<H>  3.8   |  27  |  0.49<L>    Ca    9.1      08 Jun 2020 06:48  Phos  4.6     06-08  Mg     1.5     06-08        MEDICATIONS  (STANDING):  ALBUTerol    90 MICROgram(s) HFA Inhaler 2 Puff(s) Inhalation every 4 hours  ALPRAZolam 0.5 milliGRAM(s) Oral every 6 hours  aspirin  chewable 81 milliGRAM(s) Oral daily  budesonide 160 MICROgram(s)/formoterol 4.5 MICROgram(s) Inhaler 2 Puff(s) Inhalation two times a day  chlorhexidine 0.12% Liquid 15 milliLiter(s) Oral Mucosa every 12 hours  clopidogrel Tablet 75 milliGRAM(s) Oral daily  diltiazem    Tablet 90 milliGRAM(s) Oral every 6 hours  doxazosin 2 milliGRAM(s) Oral at bedtime  enoxaparin Injectable 40 milliGRAM(s) SubCutaneous every 12 hours  famotidine    Tablet 20 milliGRAM(s) Oral two times a day  gabapentin 400 milliGRAM(s) Oral three times a day  magnesium oxide 400 milliGRAM(s) Oral once  propofol Injectable 80 milliGRAM(s) IV Push once  QUEtiapine 100 milliGRAM(s) Oral daily  QUEtiapine 150 milliGRAM(s) Oral at bedtime  thiamine 100 milliGRAM(s) Oral daily  tiotropium 18 MICROgram(s) Capsule 1 Capsule(s) Inhalation daily    MEDICATIONS  (PRN):  acetaminophen   Tablet .. 650 milliGRAM(s) Oral every 6 hours PRN Temp greater or equal to 38.5C (101.3F)  ibuprofen  Tablet. 400 milliGRAM(s) Oral every 6 hours PRN Moderate Pain (4 - 6)      Physical exam  Gen: NAD   Neuro: AO  Card: S1 S2  Pulm: Coarse breath sounds  Abd: Soft PEG site c/d/i  Ext: no edema      I&O's Summary    07 Jun 2020 07:01  -  08 Jun 2020 07:00  --------------------------------------------------------  IN: 1521 mL / OUT: 1400 mL / NET: 121 mL        Assessment  65y Male  w/ PAST MEDICAL & SURGICAL HISTORY:  Chronic CHF  COPD without exacerbation  Atrial fibrillation  Presence of Watchman left atrial appendage closure device  Hypertension  GERD (gastroesophageal reflux disease)  Chronic obstructive pulmonary disease (COPD)  Anemia  Falls  Meningitis  Collapsed lung  Alcohol withdrawal  Emphysema of lung  Cirrhosis  CHF (congestive heart failure)  Poor historian  Alcohol abuse  Chronic atrial fibrillation  Unilateral amputation of lower extremity below knee  Presence of Watchman left atrial appendage closure device  S/P BKA (below knee amputation) unilateral, left  Patient is a 65y old  Male who presents with a chief complaint of acute hypoxemic, hypercapneic resp failure  COPD exacerbation (08 Jun 2020 09:38)  .  1.  COPD with acute exacerbation  2.  Acute on chronic respiratory failure requiring tracheostomy  3.  MRSA/pseudomonas in sputum  4.  Tracheostomy change due to cuff leak    Patient was sedated today for tracheostomy change.  Has replaced #7 cuffed portex.    PLAN  Neuro: Anxiolytics as per ICU team  Pulm:  CPAP trials  Cardio: Monitor telemetry/alarms  GI: Tube feeds to goal  Renal: monitor urine output, supplement electrolytes as needed  Vasc: Lovenox  for DVT ppx  Heme: Stable H/H. .   ID: course of antibiotics completed  Therapy: OT/PT    Dispo planning    Discussed with Cardiothoracic Team at AM rounds.

## 2020-06-08 NOTE — PROCEDURE NOTE - NSICDXPROCEDURE_GEN_ALL_CORE_FT
PROCEDURES:  Tracheostomy tube change 08-Jun-2020 09:55:00  Virginia Sanabria
PROCEDURES:  Insertion, nasogastric tube 28-Apr-2020 13:30:36  Juana Chadwick

## 2020-06-08 NOTE — PROCEDURE NOTE - ADDITIONAL PROCEDURE DETAILS
Patient was given 4mg of versed and 80 mg of propofol for sedation by RN.  After tracheostomy was changed - bronchoscope was passed to verification of placement.
Post placement KUB checked to verify placement.
indications: acute hypercapnic hypoxemic respiratory failure, altered mental status, acute COPD exacerbation, EtOH withdrawal with delirium

## 2020-06-09 LAB
ANION GAP SERPL CALC-SCNC: 3 MMOL/L — LOW (ref 5–17)
BUN SERPL-MCNC: 16 MG/DL — SIGNIFICANT CHANGE UP (ref 7–23)
CALCIUM SERPL-MCNC: 9.4 MG/DL — SIGNIFICANT CHANGE UP (ref 8.5–10.1)
CHLORIDE SERPL-SCNC: 105 MMOL/L — SIGNIFICANT CHANGE UP (ref 96–108)
CO2 SERPL-SCNC: 31 MMOL/L — SIGNIFICANT CHANGE UP (ref 22–31)
CREAT SERPL-MCNC: 0.5 MG/DL — SIGNIFICANT CHANGE UP (ref 0.5–1.3)
GLUCOSE SERPL-MCNC: 99 MG/DL — SIGNIFICANT CHANGE UP (ref 70–99)
MAGNESIUM SERPL-MCNC: 1.6 MG/DL — SIGNIFICANT CHANGE UP (ref 1.6–2.6)
PHOSPHATE SERPL-MCNC: 4.5 MG/DL — SIGNIFICANT CHANGE UP (ref 2.5–4.5)
POTASSIUM SERPL-MCNC: 3.8 MMOL/L — SIGNIFICANT CHANGE UP (ref 3.5–5.3)
POTASSIUM SERPL-SCNC: 3.8 MMOL/L — SIGNIFICANT CHANGE UP (ref 3.5–5.3)
SODIUM SERPL-SCNC: 139 MMOL/L — SIGNIFICANT CHANGE UP (ref 135–145)

## 2020-06-09 PROCEDURE — 99232 SBSQ HOSP IP/OBS MODERATE 35: CPT

## 2020-06-09 RX ADMIN — Medication 90 MILLIGRAM(S): at 23:09

## 2020-06-09 RX ADMIN — GABAPENTIN 400 MILLIGRAM(S): 400 CAPSULE ORAL at 06:23

## 2020-06-09 RX ADMIN — BUDESONIDE AND FORMOTEROL FUMARATE DIHYDRATE 2 PUFF(S): 160; 4.5 AEROSOL RESPIRATORY (INHALATION) at 21:40

## 2020-06-09 RX ADMIN — BUDESONIDE AND FORMOTEROL FUMARATE DIHYDRATE 2 PUFF(S): 160; 4.5 AEROSOL RESPIRATORY (INHALATION) at 08:15

## 2020-06-09 RX ADMIN — CHLORHEXIDINE GLUCONATE 15 MILLILITER(S): 213 SOLUTION TOPICAL at 18:15

## 2020-06-09 RX ADMIN — Medication 100 MILLIGRAM(S): at 11:23

## 2020-06-09 RX ADMIN — Medication 0.5 MILLIGRAM(S): at 06:22

## 2020-06-09 RX ADMIN — Medication 0.5 MILLIGRAM(S): at 00:30

## 2020-06-09 RX ADMIN — Medication 0.5 MILLIGRAM(S): at 18:15

## 2020-06-09 RX ADMIN — ALBUTEROL 2 PUFF(S): 90 AEROSOL, METERED ORAL at 08:14

## 2020-06-09 RX ADMIN — ENOXAPARIN SODIUM 40 MILLIGRAM(S): 100 INJECTION SUBCUTANEOUS at 06:22

## 2020-06-09 RX ADMIN — Medication 90 MILLIGRAM(S): at 06:22

## 2020-06-09 RX ADMIN — GABAPENTIN 400 MILLIGRAM(S): 400 CAPSULE ORAL at 13:44

## 2020-06-09 RX ADMIN — Medication 81 MILLIGRAM(S): at 11:23

## 2020-06-09 RX ADMIN — ENOXAPARIN SODIUM 40 MILLIGRAM(S): 100 INJECTION SUBCUTANEOUS at 18:15

## 2020-06-09 RX ADMIN — Medication 90 MILLIGRAM(S): at 00:30

## 2020-06-09 RX ADMIN — Medication 0.5 MILLIGRAM(S): at 11:24

## 2020-06-09 RX ADMIN — ALBUTEROL 2 PUFF(S): 90 AEROSOL, METERED ORAL at 16:02

## 2020-06-09 RX ADMIN — Medication 90 MILLIGRAM(S): at 18:15

## 2020-06-09 RX ADMIN — FAMOTIDINE 20 MILLIGRAM(S): 10 INJECTION INTRAVENOUS at 06:22

## 2020-06-09 RX ADMIN — Medication 0.5 MILLIGRAM(S): at 23:09

## 2020-06-09 RX ADMIN — Medication 90 MILLIGRAM(S): at 11:23

## 2020-06-09 RX ADMIN — ALBUTEROL 2 PUFF(S): 90 AEROSOL, METERED ORAL at 00:07

## 2020-06-09 RX ADMIN — FAMOTIDINE 20 MILLIGRAM(S): 10 INJECTION INTRAVENOUS at 18:15

## 2020-06-09 RX ADMIN — QUETIAPINE FUMARATE 100 MILLIGRAM(S): 200 TABLET, FILM COATED ORAL at 11:23

## 2020-06-09 RX ADMIN — Medication 2 MILLIGRAM(S): at 22:24

## 2020-06-09 RX ADMIN — GABAPENTIN 400 MILLIGRAM(S): 400 CAPSULE ORAL at 22:23

## 2020-06-09 RX ADMIN — ALBUTEROL 2 PUFF(S): 90 AEROSOL, METERED ORAL at 12:02

## 2020-06-09 RX ADMIN — CHLORHEXIDINE GLUCONATE 15 MILLILITER(S): 213 SOLUTION TOPICAL at 06:23

## 2020-06-09 RX ADMIN — QUETIAPINE FUMARATE 150 MILLIGRAM(S): 200 TABLET, FILM COATED ORAL at 22:23

## 2020-06-09 RX ADMIN — ALBUTEROL 2 PUFF(S): 90 AEROSOL, METERED ORAL at 03:11

## 2020-06-09 RX ADMIN — ALBUTEROL 2 PUFF(S): 90 AEROSOL, METERED ORAL at 21:40

## 2020-06-09 RX ADMIN — CLOPIDOGREL BISULFATE 75 MILLIGRAM(S): 75 TABLET, FILM COATED ORAL at 11:23

## 2020-06-09 NOTE — PROGRESS NOTE ADULT - SUBJECTIVE AND OBJECTIVE BOX
Hospital # 51  ICU # 46    CC:  Respiratory Failure     64 y/o M admitted on 4/18 with COPD exacerbation -- RRT called on 4/23 for AMS requiring intubation  extubated on 4/26 then re intubated on 4/29.    S/P Trach and PEG on 5/15 and has failed SBT.    Most recent active issues is persistent MSSA sepsis and CRP in sputum-- treated with IV vanco and Cipro.  No central access      5/22:  remains intubated, encephalopathy.  (+) MSSA + in Bcx  5/23:  Tolerating limited PSV - persistent poor mentation.  Repeat BCx sent.  Abx changed to Ancef  5/24:  Rapid AFib O/N.  Still Encephalopathy.  COVID sent.  5/23 BCx still P.    5/25: cultures from 5/23 remain NGTD - COVID swab again negative on 5/24.    5/26: unrevealing MYA - NO evidence of vegetations.    Case and in particular, last 7 days discussed with Dr Dorado who has been caring for the patient.    6/8: Pt recent hospital course has been complicated by encephalopathy and delirium that limit his ability for placement in appropriate rehab or nursing facilities.  Despite attempts at non-pharmacologic interventions coupled with Xanax, haldol, and Seroquel,  pt still requiring restraints much of the time to prevent dislodging of his trach and/or PEG and potential harm to self.  Trach changed today by Thoracic sx team.  TF in progress.      6/9: pt remains grossly unchanged.  Hemodynamics reasonable.    PMHx - chronic AFib w Watchman device, CHF, COPD, HTN, obesity         Allergies    Ceclor (Rash)  Duricef (Rash)        ICU Vital Signs Last 24 Hrs  T(C): 36.9 (09 Jun 2020 04:00), Max: 37.3 (08 Jun 2020 08:00)  T(F): 98.5 (09 Jun 2020 04:00), Max: 99.2 (08 Jun 2020 08:00)  HR: 79 (09 Jun 2020 06:00) (65 - 100)  BP: 112/73 (09 Jun 2020 06:00) (98/68 - 135/74)  BP(mean): 83 (09 Jun 2020 06:00) (66 - 97)  RR: 21 (09 Jun 2020 06:00) (21 - 36)  SpO2: 100% (09 Jun 2020 06:00) (92% - 100%)      Mode: AC/ CMV (Assist Control/ Continuous Mandatory Ventilation)  RR (machine): 15  TV (machine): 450  FiO2: 30  PEEP: 5  ITime: 1  PIP: 38      I&O's Summary    08 Jun 2020 07:01  -  09 Jun 2020 07:00  --------------------------------------------------------  IN: 3085 mL / OUT: 1775 mL / NET: 1310 mL      06-09    139  |  105  |  16  ----------------------------<  99  3.8   |  31  |  0.50    Ca    9.4      09 Jun 2020 06:36  Phos  4.5     06-09  Mg     1.6     06-09      MEDICATIONS  (STANDING):  ALBUTerol    90 MICROgram(s) HFA Inhaler 2 Puff(s) Inhalation every 4 hours  ALPRAZolam 0.5 milliGRAM(s) Oral every 6 hours  aspirin  chewable 81 milliGRAM(s) Oral daily  budesonide 160 MICROgram(s)/formoterol 4.5 MICROgram(s) Inhaler 2 Puff(s) Inhalation two times a day  chlorhexidine 0.12% Liquid 15 milliLiter(s) Oral Mucosa every 12 hours  clopidogrel Tablet 75 milliGRAM(s) Oral daily  diltiazem    Tablet 90 milliGRAM(s) Oral every 6 hours  doxazosin 2 milliGRAM(s) Oral at bedtime  enoxaparin Injectable 40 milliGRAM(s) SubCutaneous every 12 hours  famotidine    Tablet 20 milliGRAM(s) Oral two times a day  gabapentin 400 milliGRAM(s) Oral three times a day  QUEtiapine 100 milliGRAM(s) Oral daily  QUEtiapine 150 milliGRAM(s) Oral at bedtime  thiamine 100 milliGRAM(s) Oral daily  tiotropium 18 MICROgram(s) Capsule 1 Capsule(s) Inhalation daily    MEDICATIONS  (PRN):  acetaminophen   Tablet .. 650 milliGRAM(s) Oral every 6 hours PRN Temp greater or equal to 38.5C (101.3F)  ibuprofen  Tablet. 400 milliGRAM(s) Oral every 6 hours PRN Moderate Pain (4 - 6)            Review of Systems:   · Additional ROS	Unobtainable due to clinical condition	    Physical Exam:   · Constitutional	detailed exam	  · Constitutional Details	ill; restless but poorly cooperative	  · ENMT	detailed exam	  · ENMT Comments	Trach in situ connected to Vent	  · Respiratory	detailed exam	  · Respiratory Details	breath sounds equal B/L; NO sig W/R/R	  · Cardiovascular	detailed exam	  · Cardiovascular Details	irregular rate and rhythm	  · Gastrointestinal	detailed exam	  · GI Normal	soft; NT/ND (+) BS	  · GI Abnormal	+ PEG in situ; Site is CDI	  · Extremities	detailed exam	  · Extremities Details	no cyanosis; LLE BKA	  · Neurological	detailed exam	  · Neurological Details	Encephalopathy persists; minimally interactive; MARIE spont NO gross motor or sensory deficits	  · Skin	detailed exam	  · Skin Details	warm and dry	        DVT Prophylaxis: Lovenox q12h, venodynes    Visit Information:  SSQ

## 2020-06-09 NOTE — PROGRESS NOTE ADULT - SUBJECTIVE AND OBJECTIVE BOX
Subjective:    pat on vent with PS, no respiratory distress.    Home Medications:  acetaminophen 325 mg oral tablet: 2 tab(s) orally every 6 hours, As needed, Temp greater or equal to 38.5C (101.3F) (02 Jun 2020 12:53)  albuterol 90 mcg/inh inhalation aerosol: 2 puff(s) inhaled every 4 hours (02 Jun 2020 12:53)  ALPRAZolam 0.5 mg oral tablet: 1 tab(s) orally every 6 hours (02 Jun 2020 12:53)  aspirin 81 mg oral tablet, chewable: 1 tab(s) orally once a day (02 Jun 2020 12:53)  clopidogrel 75 mg oral tablet: 1 tab(s) orally once a day (02 Jun 2020 12:53)  dilTIAZem 90 mg oral tablet: 1 tab(s) orally every 6 hours (02 Jun 2020 12:53)  doxazosin 2 mg oral tablet: 1 tab(s) orally once a day (at bedtime) (02 Jun 2020 12:53)  gabapentin 400 mg oral capsule: 1 cap(s) orally 3 times a day (02 Jun 2020 12:53)  pantoprazole 40 mg oral granule, enteric coated: 40 milligram(s) orally once a day (19 Apr 2020 03:12)  QUEtiapine 100 mg oral tablet: 1 tab(s) orally once a day (at bedtime) (02 Jun 2020 12:53)  QUEtiapine 50 mg oral tablet: 1 tab(s) orally once a day (02 Jun 2020 12:53)  Spiriva 18 mcg inhalation capsule: 1 cap(s) inhaled once a day (19 Apr 2020 03:12)  thiamine 100 mg oral tablet: 1 tab(s) orally once a day (02 Jun 2020 12:53)    MEDICATIONS  (STANDING):  ALBUTerol    90 MICROgram(s) HFA Inhaler 2 Puff(s) Inhalation every 4 hours  ALPRAZolam 0.5 milliGRAM(s) Oral every 6 hours  aspirin  chewable 81 milliGRAM(s) Oral daily  budesonide 160 MICROgram(s)/formoterol 4.5 MICROgram(s) Inhaler 2 Puff(s) Inhalation two times a day  chlorhexidine 0.12% Liquid 15 milliLiter(s) Oral Mucosa every 12 hours  clopidogrel Tablet 75 milliGRAM(s) Oral daily  diltiazem    Tablet 90 milliGRAM(s) Oral every 6 hours  doxazosin 2 milliGRAM(s) Oral at bedtime  enoxaparin Injectable 40 milliGRAM(s) SubCutaneous every 12 hours  famotidine    Tablet 20 milliGRAM(s) Oral two times a day  gabapentin 400 milliGRAM(s) Oral three times a day  QUEtiapine 100 milliGRAM(s) Oral daily  QUEtiapine 150 milliGRAM(s) Oral at bedtime  thiamine 100 milliGRAM(s) Oral daily  tiotropium 18 MICROgram(s) Capsule 1 Capsule(s) Inhalation daily    MEDICATIONS  (PRN):  acetaminophen   Tablet .. 650 milliGRAM(s) Oral every 6 hours PRN Temp greater or equal to 38.5C (101.3F)  ibuprofen  Tablet. 400 milliGRAM(s) Oral every 6 hours PRN Moderate Pain (4 - 6)      Allergies    Ceclor (Rash)  Duricef (Rash)    Intolerances        Vital Signs Last 24 Hrs  T(C): 36.9 (09 Jun 2020 04:00), Max: 37.1 (08 Jun 2020 17:00)  T(F): 98.5 (09 Jun 2020 04:00), Max: 98.8 (08 Jun 2020 17:00)  HR: 137 (09 Jun 2020 11:59) (65 - 137)  BP: 140/125 (09 Jun 2020 11:00) (98/68 - 144/124)  BP(mean): 129 (09 Jun 2020 11:00) (66 - 129)  RR: 26 (09 Jun 2020 11:00) (21 - 31)  SpO2: 98% (09 Jun 2020 11:59) (92% - 100%)  Mode: PS (Pressure Support)/ Spontaneous  FiO2: 30  PEEP: 5  PS: 10  ITime: 1  MAP: 9  PIP: 16      PHYSICAL EXAMINATION:    NECK:  Supple. No lymphadenopathy. Jugular venous pressure not elevated. Carotids equal.   HEART:   The cardiac impulse has a normal quality. Reg., Nl S1 and S2.  There are no murmurs, rubs or gallops noted  CHEST:  Chest crackles to auscultation. Normal respiratory effort.  ABDOMEN:  Soft and nontender.   EXTREMITIES:  There is no edema.       LABS:                        9.6    4.63  )-----------( 223      ( 08 Jun 2020 06:48 )             29.6     06-09    139  |  105  |  16  ----------------------------<  99  3.8   |  31  |  0.50    Ca    9.4      09 Jun 2020 06:36  Phos  4.5     06-09  Mg     1.6     06-09

## 2020-06-09 NOTE — PROGRESS NOTE ADULT - ASSESSMENT
PROBLEMS;    s/p staph bactermia/sputum pseudomonas/staph  Ac on chronic hypercapnic & hypoxamic respiratory failure-s/p trach & PEG  sputum-Few Pseudomonas aeruginosa (Carbapenem Resistant)/Moderate Methicillin resistant Staphylococcus aureus  afib with RVR  OHS/VIJAY  AC flare of COPD/chronic vs acute on chronic bronchitis  Mild interstitial lung disease-NSIP h/o smoking  COVID negativex2  Pulmonary hypertension  Nonobstructing stone in the left ureterovesicular junction/ nonobstructing stone in the lower pole right kidney.  Subacute hemorrhage in the right rectus abdominis along the anterior sheath, measures 1.6 cm in thickness and extends from the umbilicus to the inferior myotendinous junction  Cirrhosis  Mild splenomegaly-portal venous hypertension.  Chronic afib w Watchman device  CHF  BPH  GERD  ETOH abuse  seizures disorder    PLAN;    pulmonary slow recovery-on vent with PS-waiting placement for rehab  Tube feeding as tolerated  supportive care  covid repeat testing-neg  Eliquis/asa 81  d/w staff

## 2020-06-09 NOTE — PROGRESS NOTE ADULT - SUBJECTIVE AND OBJECTIVE BOX
Subjective:  No acute events overnight.    Vital Signs:  Vital Signs Last 24 Hrs  T(C): 36.9 (06-09-20 @ 04:00), Max: 37.1 (06-08-20 @ 17:00)  T(F): 98.5 (06-09-20 @ 04:00), Max: 98.8 (06-08-20 @ 17:00)  HR: 116 (06-09-20 @ 11:00) (65 - 116)  BP: 140/125 (06-09-20 @ 11:00) (98/68 - 144/124)  RR: 26 (06-09-20 @ 11:00) (21 - 31)  SpO2: 99% (06-09-20 @ 11:00) (92% - 100%) on (O2)    Telemetry/Alarms: atrial fibrillation    Relevant labs, radiology and Medications reviewed                        9.6    4.63  )-----------( 223      ( 08 Jun 2020 06:48 )             29.6     06-09    139  |  105  |  16  ----------------------------<  99  3.8   |  31  |  0.50    Ca    9.4      09 Jun 2020 06:36  Phos  4.5     06-09  Mg     1.6     06-09        MEDICATIONS  (STANDING):  ALBUTerol    90 MICROgram(s) HFA Inhaler 2 Puff(s) Inhalation every 4 hours  ALPRAZolam 0.5 milliGRAM(s) Oral every 6 hours  aspirin  chewable 81 milliGRAM(s) Oral daily  budesonide 160 MICROgram(s)/formoterol 4.5 MICROgram(s) Inhaler 2 Puff(s) Inhalation two times a day  chlorhexidine 0.12% Liquid 15 milliLiter(s) Oral Mucosa every 12 hours  clopidogrel Tablet 75 milliGRAM(s) Oral daily  diltiazem    Tablet 90 milliGRAM(s) Oral every 6 hours  doxazosin 2 milliGRAM(s) Oral at bedtime  enoxaparin Injectable 40 milliGRAM(s) SubCutaneous every 12 hours  famotidine    Tablet 20 milliGRAM(s) Oral two times a day  gabapentin 400 milliGRAM(s) Oral three times a day  QUEtiapine 100 milliGRAM(s) Oral daily  QUEtiapine 150 milliGRAM(s) Oral at bedtime  thiamine 100 milliGRAM(s) Oral daily  tiotropium 18 MICROgram(s) Capsule 1 Capsule(s) Inhalation daily    MEDICATIONS  (PRN):  acetaminophen   Tablet .. 650 milliGRAM(s) Oral every 6 hours PRN Temp greater or equal to 38.5C (101.3F)  ibuprofen  Tablet. 400 milliGRAM(s) Oral every 6 hours PRN Moderate Pain (4 - 6)      Physical exam  Gen: NAD  Neuro: AO  Card: S1 S2  Pulm: equal bilaterally  Abd: soft NT ND  Ext: mild edema    I&O's Summary    08 Jun 2020 07:01  -  09 Jun 2020 07:00  --------------------------------------------------------  IN: 3085 mL / OUT: 1775 mL / NET: 1310 mL        Assessment  65y Male  w/ PAST MEDICAL & SURGICAL HISTORY:  Chronic CHF  COPD without exacerbation  Atrial fibrillation  Presence of Watchman left atrial appendage closure device  Hypertension  GERD (gastroesophageal reflux disease)  Chronic obstructive pulmonary disease (COPD)  Anemia  Falls  Meningitis  Collapsed lung  Alcohol withdrawal  Emphysema of lung  Cirrhosis  CHF (congestive heart failure)  Poor historian  Alcohol abuse  Chronic atrial fibrillation  Unilateral amputation of lower extremity below knee  Presence of Watchman left atrial appendage closure device  S/P BKA (below knee amputation) unilateral, left    Patient is a 65y old  Male who presents with a chief complaint of acute hypoxemic, hypercapnic resp failure  COPD exacerbation (09 Jun 2020 07:45)      1.  COPD with acute exacerbation  2.  Acute on chronic respiratory failure requiring tracheostomy  3.  MRSA/pseudomonas in sputum  4.  Tracheostomy change due to cuff leak    Patient was sedated today for tracheostomy change.  Has replaced #7 cuffed portex.    PLAN  Neuro: Anxiolytics as per ICU team  Pulm:  CPAP trials  Cardio: Monitor telemetry/alarms  GI: Tube feeds to goal  Renal: monitor urine output, supplement electrolytes as needed  Vasc: Lovenox  for DVT ppx  Heme: Stable H/H. .   ID: course of antibiotics completed  Therapy: OT/PT    Dispo planning    Discussed with Cardiothoracic Team at AM rounds.

## 2020-06-10 PROCEDURE — 99232 SBSQ HOSP IP/OBS MODERATE 35: CPT

## 2020-06-10 RX ADMIN — GABAPENTIN 400 MILLIGRAM(S): 400 CAPSULE ORAL at 22:29

## 2020-06-10 RX ADMIN — Medication 90 MILLIGRAM(S): at 17:32

## 2020-06-10 RX ADMIN — ENOXAPARIN SODIUM 40 MILLIGRAM(S): 100 INJECTION SUBCUTANEOUS at 17:31

## 2020-06-10 RX ADMIN — QUETIAPINE FUMARATE 100 MILLIGRAM(S): 200 TABLET, FILM COATED ORAL at 12:01

## 2020-06-10 RX ADMIN — CLOPIDOGREL BISULFATE 75 MILLIGRAM(S): 75 TABLET, FILM COATED ORAL at 12:02

## 2020-06-10 RX ADMIN — GABAPENTIN 400 MILLIGRAM(S): 400 CAPSULE ORAL at 14:24

## 2020-06-10 RX ADMIN — Medication 0.5 MILLIGRAM(S): at 23:52

## 2020-06-10 RX ADMIN — Medication 0.5 MILLIGRAM(S): at 06:43

## 2020-06-10 RX ADMIN — Medication 90 MILLIGRAM(S): at 23:52

## 2020-06-10 RX ADMIN — FAMOTIDINE 20 MILLIGRAM(S): 10 INJECTION INTRAVENOUS at 06:43

## 2020-06-10 RX ADMIN — Medication 100 MILLIGRAM(S): at 12:02

## 2020-06-10 RX ADMIN — FAMOTIDINE 20 MILLIGRAM(S): 10 INJECTION INTRAVENOUS at 17:32

## 2020-06-10 RX ADMIN — QUETIAPINE FUMARATE 150 MILLIGRAM(S): 200 TABLET, FILM COATED ORAL at 22:29

## 2020-06-10 RX ADMIN — GABAPENTIN 400 MILLIGRAM(S): 400 CAPSULE ORAL at 06:43

## 2020-06-10 RX ADMIN — Medication 0.5 MILLIGRAM(S): at 17:32

## 2020-06-10 RX ADMIN — CHLORHEXIDINE GLUCONATE 15 MILLILITER(S): 213 SOLUTION TOPICAL at 06:44

## 2020-06-10 RX ADMIN — CHLORHEXIDINE GLUCONATE 15 MILLILITER(S): 213 SOLUTION TOPICAL at 17:32

## 2020-06-10 RX ADMIN — Medication 90 MILLIGRAM(S): at 12:01

## 2020-06-10 RX ADMIN — ENOXAPARIN SODIUM 40 MILLIGRAM(S): 100 INJECTION SUBCUTANEOUS at 06:44

## 2020-06-10 RX ADMIN — Medication 0.5 MILLIGRAM(S): at 12:01

## 2020-06-10 RX ADMIN — Medication 90 MILLIGRAM(S): at 06:43

## 2020-06-10 RX ADMIN — ALBUTEROL 2 PUFF(S): 90 AEROSOL, METERED ORAL at 06:37

## 2020-06-10 RX ADMIN — Medication 2 MILLIGRAM(S): at 22:30

## 2020-06-10 RX ADMIN — Medication 81 MILLIGRAM(S): at 12:02

## 2020-06-10 NOTE — PROGRESS NOTE ADULT - ASSESSMENT
PROBLEMS;    s/p staph bactermia/sputum pseudomonas/staph  Ac on chronic hypercapnic & hypoxamic respiratory failure-s/p trach & PEG  sputum-Few Pseudomonas aeruginosa (Carbapenem Resistant)/Moderate Methicillin resistant Staphylococcus aureus  afib with RVR  OHS/VIJAY  AC flare of COPD/chronic vs acute on chronic bronchitis  Mild interstitial lung disease-NSIP h/o smoking  COVID negativex2  Pulmonary hypertension  Nonobstructing stone in the left ureterovesicular junction/ nonobstructing stone in the lower pole right kidney.  Subacute hemorrhage in the right rectus abdominis along the anterior sheath, measures 1.6 cm in thickness and extends from the umbilicus to the inferior myotendinous junction  Cirrhosis  Mild splenomegaly-portal venous hypertension.  Chronic afib w Watchman device  CHF  BPH  GERD  ETOH abuse  seizures disorder    PLAN;    pulmonary slow recovery-on vent -waiting placement for rehab  Tube feeding as tolerated  supportive care  covid repeat testing-neg  Eliquis/asa 81  d/w staff

## 2020-06-10 NOTE — PROGRESS NOTE ADULT - SUBJECTIVE AND OBJECTIVE BOX
Subjective:    pat on ventm last ed 3hrs of cpap yesterday, sleepy.    Home Medications:  acetaminophen 325 mg oral tablet: 2 tab(s) orally every 6 hours, As needed, Temp greater or equal to 38.5C (101.3F) (02 Jun 2020 12:53)  albuterol 90 mcg/inh inhalation aerosol: 2 puff(s) inhaled every 4 hours (02 Jun 2020 12:53)  ALPRAZolam 0.5 mg oral tablet: 1 tab(s) orally every 6 hours (02 Jun 2020 12:53)  aspirin 81 mg oral tablet, chewable: 1 tab(s) orally once a day (02 Jun 2020 12:53)  clopidogrel 75 mg oral tablet: 1 tab(s) orally once a day (02 Jun 2020 12:53)  dilTIAZem 90 mg oral tablet: 1 tab(s) orally every 6 hours (02 Jun 2020 12:53)  doxazosin 2 mg oral tablet: 1 tab(s) orally once a day (at bedtime) (02 Jun 2020 12:53)  gabapentin 400 mg oral capsule: 1 cap(s) orally 3 times a day (02 Jun 2020 12:53)  pantoprazole 40 mg oral granule, enteric coated: 40 milligram(s) orally once a day (19 Apr 2020 03:12)  QUEtiapine 100 mg oral tablet: 1 tab(s) orally once a day (at bedtime) (02 Jun 2020 12:53)  QUEtiapine 50 mg oral tablet: 1 tab(s) orally once a day (02 Jun 2020 12:53)  Spiriva 18 mcg inhalation capsule: 1 cap(s) inhaled once a day (19 Apr 2020 03:12)  thiamine 100 mg oral tablet: 1 tab(s) orally once a day (02 Jun 2020 12:53)    MEDICATIONS  (STANDING):  ALBUTerol    90 MICROgram(s) HFA Inhaler 2 Puff(s) Inhalation every 4 hours  ALPRAZolam 0.5 milliGRAM(s) Oral every 6 hours  aspirin  chewable 81 milliGRAM(s) Oral daily  budesonide 160 MICROgram(s)/formoterol 4.5 MICROgram(s) Inhaler 2 Puff(s) Inhalation two times a day  chlorhexidine 0.12% Liquid 15 milliLiter(s) Oral Mucosa every 12 hours  clopidogrel Tablet 75 milliGRAM(s) Oral daily  diltiazem    Tablet 90 milliGRAM(s) Oral every 6 hours  doxazosin 2 milliGRAM(s) Oral at bedtime  enoxaparin Injectable 40 milliGRAM(s) SubCutaneous every 12 hours  famotidine    Tablet 20 milliGRAM(s) Oral two times a day  gabapentin 400 milliGRAM(s) Oral three times a day  QUEtiapine 100 milliGRAM(s) Oral daily  QUEtiapine 150 milliGRAM(s) Oral at bedtime  thiamine 100 milliGRAM(s) Oral daily  tiotropium 18 MICROgram(s) Capsule 1 Capsule(s) Inhalation daily    MEDICATIONS  (PRN):  acetaminophen   Tablet .. 650 milliGRAM(s) Oral every 6 hours PRN Temp greater or equal to 38.5C (101.3F)  ibuprofen  Tablet. 400 milliGRAM(s) Oral every 6 hours PRN Moderate Pain (4 - 6)      Allergies    Ceclor (Rash)  Duricef (Rash)    Intolerances        Vital Signs Last 24 Hrs  T(C): 36.7 (10 Jackson 2020 08:00), Max: 36.9 (10 Jackson 2020 04:00)  T(F): 98 (10 Jackson 2020 08:00), Max: 98.4 (10 Jackson 2020 04:00)  HR: 84 (10 Jackson 2020 11:00) (76 - 108)  BP: 123/67 (10 Jackson 2020 11:00) (81/45 - 169/95)  BP(mean): 81 (10 Jackson 2020 11:00) (52 - 115)  RR: 28 (10 Jackson 2020 11:00) (18 - 33)  SpO2: 100% (10 Jackson 2020 11:00) (95% - 100%)  Mode: AC/ CMV (Assist Control/ Continuous Mandatory Ventilation)  RR (machine): 15  TV (machine): 450  FiO2: 30  PEEP: 5  ITime: 0.7  MAP: 11  PIP: 29      PHYSICAL EXAMINATION:    NECK:  Supple. No lymphadenopathy. Jugular venous pressure not elevated. Carotids equal.   HEART:   The cardiac impulse has a normal quality. Reg., Nl S1 and S2.  There are no murmurs, rubs or gallops noted  CHEST:  Chest crackles to auscultation. Normal respiratory effort.  ABDOMEN:  Soft and nontender.   EXTREMITIES:  There is no edema.       LABS:    06-09    139  |  105  |  16  ----------------------------<  99  3.8   |  31  |  0.50    Ca    9.4      09 Jun 2020 06:36  Phos  4.5     06-09  Mg     1.6     06-09

## 2020-06-10 NOTE — PROGRESS NOTE ADULT - SUBJECTIVE AND OBJECTIVE BOX
[facility]  ICU # 47    CC:  Respiratory Failure     64 y/o M admitted on 4/18 with COPD exacerbation -- RRT called on 4/23 for AMS requiring intubation  extubated on 4/26 then re intubated on 4/29.    S/P Trach and PEG on 5/15 and has failed SBT.    Most recent active issues is persistent MSSA sepsis and CRP in sputum-- treated with IV vanco and Cipro.  No central access      5/22:  remains intubated, encephalopathy.  (+) MSSA + in Bcx  5/23:  Tolerating limited PSV - persistent poor mentation.  Repeat BCx sent.  Abx changed to Ancef  5/24:  Rapid AFib O/N.  Still Encephalopathy.  COVID sent.  5/23 BCx still P.    5/25: cultures from 5/23 remain NGTD - COVID swab again negative on 5/24.    5/26: unrevealing MYA - NO evidence of vegetations.    Case and in particular, last 7 days discussed with Dr Dorado who has been caring for the patient.    6/8: Pt recent hospital course has been complicated by encephalopathy and delirium that limit his ability for placement in appropriate rehab or nursing facilities.  Despite attempts at non-pharmacologic interventions coupled with Xanax, haldol, and Seroquel,  pt still requiring restraints much of the time to prevent dislodging of his trach and/or PEG and potential harm to self.  Trach changed today by Thoracic sx team.  TF in progress.      6/10: pt remains grossly unchanged.  Hemodynamics reasonable.  Seems less restless/agitated than he has been in the recent past.  Did poorly with attempt at PSV yesterday - became tachycardic and mildly distressed.  Will try again today.    PMHx - chronic AFib w Watchman device, CHF, COPD, HTN, obesity     Allergies    Ceclor (Rash)  Duricef (Rash)      ICU Vital Signs Last 24 Hrs  T(C): 36.7 (10 Jackson 2020 08:00), Max: 37.2 (09 Jun 2020 12:00)  T(F): 98 (10 Jackson 2020 08:00), Max: 99 (09 Jun 2020 12:00)  HR: 76 (10 Jackson 2020 09:00) (76 - 137)  BP: 125/73 (10 Jackson 2020 09:00) (81/45 - 169/95)  BP(mean): 85 (10 Jackson 2020 09:00) (52 - 129)  RR: 21 (10 Jackson 2020 09:00) (18 - 33)  SpO2: 99% (10 Jackson 2020 09:00) (95% - 100%)      Mode: AC/ CMV (Assist Control/ Continuous Mandatory Ventilation)  RR (machine): 15  TV (machine): 450  FiO2: 30  PEEP: 5  ITime: 0.7  MAP: 11  PIP: 29      I&O's Summary    09 Jun 2020 07:01  -  10 Jackson 2020 07:00  --------------------------------------------------------  IN: 1912 mL / OUT: 1950 mL / NET: -38 mL            06-09    139  |  105  |  16  ----------------------------<  99  3.8   |  31  |  0.50    Ca    9.4      09 Jun 2020 06:36  Phos  4.5     06-09  Mg     1.6     06-09        MEDICATIONS  (STANDING):  ALBUTerol    90 MICROgram(s) HFA Inhaler 2 Puff(s) Inhalation every 4 hours  ALPRAZolam 0.5 milliGRAM(s) Oral every 6 hours  aspirin  chewable 81 milliGRAM(s) Oral daily  budesonide 160 MICROgram(s)/formoterol 4.5 MICROgram(s) Inhaler 2 Puff(s) Inhalation two times a day  chlorhexidine 0.12% Liquid 15 milliLiter(s) Oral Mucosa every 12 hours  clopidogrel Tablet 75 milliGRAM(s) Oral daily  diltiazem    Tablet 90 milliGRAM(s) Oral every 6 hours  doxazosin 2 milliGRAM(s) Oral at bedtime  enoxaparin Injectable 40 milliGRAM(s) SubCutaneous every 12 hours  famotidine    Tablet 20 milliGRAM(s) Oral two times a day  gabapentin 400 milliGRAM(s) Oral three times a day  QUEtiapine 100 milliGRAM(s) Oral daily  QUEtiapine 150 milliGRAM(s) Oral at bedtime  thiamine 100 milliGRAM(s) Oral daily  tiotropium 18 MICROgram(s) Capsule 1 Capsule(s) Inhalation daily    MEDICATIONS  (PRN):  acetaminophen   Tablet .. 650 milliGRAM(s) Oral every 6 hours PRN Temp greater or equal to 38.5C (101.3F)  ibuprofen  Tablet. 400 milliGRAM(s) Oral every 6 hours PRN Moderate Pain (4 - 6)          Review of Systems:   · Additional ROS	Unobtainable due to clinical condition	    Physical Exam:   · Constitutional	detailed exam	  · Constitutional Details	ill; restless but poorly cooperative	  · ENMT	detailed exam	  · ENMT Comments	Trach in situ connected to Vent	  · Respiratory	detailed exam	  · Respiratory Details	breath sounds equal B/L; NO sig W/R/R	  · Cardiovascular	detailed exam	  · Cardiovascular Details	irregular rate and rhythm	  · Gastrointestinal	detailed exam	  · GI Normal	soft; NT/ND (+) BS	  · GI Abnormal	+ PEG in situ; Site is CDI	  · Extremities	detailed exam	  · Extremities Details	no cyanosis; LLE BKA	  · Neurological	detailed exam	  · Neurological Details	Encephalopathy persists; minimally interactive; MARIE spont NO gross motor or sensory deficits	  · Skin	detailed exam	  · Skin Details	warm and dry	        DVT Prophylaxis: Lovenox q12h, venodynes    Visit Information:  SSQ

## 2020-06-10 NOTE — CHART NOTE - NSCHARTNOTEFT_GEN_A_CORE
Assessment:   *pt remains pending d/c to NH    *labs reviewed WNL.    *urine output: 1950mL.  BM (+) 6/5: consider adding bowel regimen to ensure BM.  (+2) generalized edema.    *per flowsheet, pt received an average of ~1322mL/day of Jevity 1.5 over the past 4 days.  Combined with Prosource TF provided ~2223Kcal and 150g protein; which meets ~100% of estimated nutr needs.    *no new wt documented since 5/9; obtain new wt when feasible    Recommendations:  1) c/w Jevity 1.5 @ 80mL/hr with 6pkts Prosource TF   2) monitor BM; add bowel regimen prn  3) monitor hydration status; adjust free water flushes prn  4) monitor TF tolerance; keep back of bed > 35 degrees  5) obtain new wt when feasible      Diet Prescription: Diet, NPO with Tube Feed:   Tube Feeding Modality: Gastrostomy  Jevity 1.5 Randall (JEVITY1.5)  Total Volume for 24 Hours (mL): 1920  Continuous  Until Goal Tube Feed Rate (mL per Hour): 80  Tube Feed Duration (in Hours): 24  Tube Feed Start Time: 00:00  No Carb Prosource TF     Qty per Day:  6 (06-01-20 @ 13:37)      Wt Hx:  117.1Kg (5/9)  117.9Kg (4/18)      06-09-20 @ 07:01  -  06-10-20 @ 07:00  --------------------------------------------------------  IN: 1912 mL / OUT: 1950 mL / NET: -38 mL        Estimated Needs:   2250-2700Kcal (25-30Kcal/Kg of adjust BW; 90Kg)  126-162g protein (1.4-1.8g/Kg adjusted BW: 90kg)  2250-2700mL (25-30mL/Kg of adjusted BW: 90Kg)      Pertinent Labs: 06-09 Na139 mmol/L Glu 99 mg/dL K+ 3.8 mmol/L Cr  0.50 mg/dL BUN 16 mg/dL 06-09 Phos 4.5 mg/dL      Skin: karla score = 12  SDTI on Rt buttock  stage 2 PU on Lt buttock      Monitoring and Evaluation:   [x] PO intake/Nutr support infusion [ x ] Tolerance to Nutr [ x ] weights [ x ] labs[ x ] follow up per protocol  [ ] other:

## 2020-06-10 NOTE — PROGRESS NOTE ADULT - ASSESSMENT
66 y/o M admitted on 4/18 with COPD exacerbation -- RRT called on 4/23 for AMS requiring intubation  extubated on 4/26 then re intubated on 4/29.    S/P Trach and PEG on 5/15 and has failed SBT.    Most recent active issues is persistent MSSA sepsis and CRP in sputum-- s/p IV vanco and Cipro.  No central access    transitioned to Ancef which completed on 6/6 (14 days from time of 1st neg culture)  COVID Negative on 5/24, COVID-19 on 5/30 was NEGATIVE - in prep for 6/1 discharge    pt continues to suffer from:  chronic resp failure requiring ventilator support  TM Encephalopathy with restlessness/agitation  delirium  concern for endocarditis as cause of bacteremia with MSSA - NO evidence of vegetations on MYA 5/26.  Blood cultures 5/23 & 5/29:  No growth  atrial fibrillation RVR - better controlled at present  urinary retention - resolved with Cardura      PMHx - chronic AFib w Watchman device, CHF, COPD, HTN, obesity     Plan at this time is for discharge to NH/rehab when appropriate,  VAP prevention bundle  SBT as tolerated daily +/- TC if re remains stable  Seroquel - titrate to control restlessness / agitation as possible  Xanax 0.5g q 12  Cont ASA for CAD and AFib-- Diltiazem 90mg q6   Plavix    stopped (5/26) Apixiban - No need with Watchman device  chemical VTE prophylaxis Lovenox 40mg SC q12h based on BMI  Increase Seroquel to 150 mg HS and 100 mg daily  HOB > 30  Continue Cardura for urinary retention  try constant observation today and remove restraints from patient to see if he attempts to pull/remove medical devices.    Supportive care  ICU care--d/w ICU staff on multi disciplinary rounds  Discussed with social work - discharge planning.    Spoke with daughter who is aware of issues/plans and pending discharge to NH.    Attending Attestation:   30 minutes spent on total encounter; more than 50% of the visit was spent counseling and/or coordinating care by the attending physician.

## 2020-06-10 NOTE — PROGRESS NOTE ADULT - SUBJECTIVE AND OBJECTIVE BOX
Subjective:  No acute events overnight.    Vital Signs:  Vital Signs Last 24 Hrs  T(C): 36.7 (06-10-20 @ 08:00), Max: 37.2 (06-09-20 @ 12:00)  T(F): 98 (06-10-20 @ 08:00), Max: 99 (06-09-20 @ 12:00)  HR: 84 (06-10-20 @ 11:00) (76 - 137)  BP: 123/67 (06-10-20 @ 11:00) (81/45 - 169/95)  RR: 28 (06-10-20 @ 11:00) (18 - 33)  SpO2: 100% (06-10-20 @ 11:00) (95% - 100%) on 30% FiO2 PEEP 5    Telemetry/Alarms: atrial fibrillation    Relevant labs, radiology and Medications reviewed    06-09    139  |  105  |  16  ----------------------------<  99  3.8   |  31  |  0.50    Ca    9.4      09 Jun 2020 06:36  Phos  4.5     06-09  Mg     1.6     06-09        MEDICATIONS  (STANDING):  ALBUTerol    90 MICROgram(s) HFA Inhaler 2 Puff(s) Inhalation every 4 hours  ALPRAZolam 0.5 milliGRAM(s) Oral every 6 hours  aspirin  chewable 81 milliGRAM(s) Oral daily  budesonide 160 MICROgram(s)/formoterol 4.5 MICROgram(s) Inhaler 2 Puff(s) Inhalation two times a day  chlorhexidine 0.12% Liquid 15 milliLiter(s) Oral Mucosa every 12 hours  clopidogrel Tablet 75 milliGRAM(s) Oral daily  diltiazem    Tablet 90 milliGRAM(s) Oral every 6 hours  doxazosin 2 milliGRAM(s) Oral at bedtime  enoxaparin Injectable 40 milliGRAM(s) SubCutaneous every 12 hours  famotidine    Tablet 20 milliGRAM(s) Oral two times a day  gabapentin 400 milliGRAM(s) Oral three times a day  QUEtiapine 100 milliGRAM(s) Oral daily  QUEtiapine 150 milliGRAM(s) Oral at bedtime  thiamine 100 milliGRAM(s) Oral daily  tiotropium 18 MICROgram(s) Capsule 1 Capsule(s) Inhalation daily    MEDICATIONS  (PRN):  acetaminophen   Tablet .. 650 milliGRAM(s) Oral every 6 hours PRN Temp greater or equal to 38.5C (101.3F)  ibuprofen  Tablet. 400 milliGRAM(s) Oral every 6 hours PRN Moderate Pain (4 - 6)      Physical exam  Gen: NAD  Neuro: AO follows commands  Card: S1 S2  Pulm: equal bilaterally  Abd: Soft PEG in place  Ext: mild edema      I&O's Summary    09 Jun 2020 07:01  -  10 Jackson 2020 07:00  --------------------------------------------------------  IN: 1912 mL / OUT: 1950 mL / NET: -38 mL        Assessment  65y Male  w/ PAST MEDICAL & SURGICAL HISTORY:  Chronic CHF  COPD without exacerbation  Atrial fibrillation  Presence of Watchman left atrial appendage closure device  Hypertension  GERD (gastroesophageal reflux disease)  Chronic obstructive pulmonary disease (COPD)  Anemia  Falls  Meningitis  Collapsed lung  Alcohol withdrawal  Emphysema of lung  Cirrhosis  CHF (congestive heart failure)  Poor historian  Alcohol abuse  Chronic atrial fibrillation  Unilateral amputation of lower extremity below knee  Presence of Watchman left atrial appendage closure device  S/P BKA (below knee amputation) unilateral, left  65y old  Male who presents with a chief complaint of acute hypoxemic, hypercapneic resp failure  COPD exacerbation (10 Jackson 2020 09:54)    1.  COPD with acute exacerbation  2.  Acute on chronic respiratory failure requiring tracheostomy  3.  MRSA/pseudomonas in sputum  4.  Tracheostomy change due to cuff leak      PLAN  Neuro: Anxiolytics as per ICU team  Pulm:  CPAP trials  Cardio: Monitor telemetry/alarms  GI: Tube feeds to goal  Renal: monitor urine output, supplement electrolytes as needed  Vasc: Lovenox  for DVT ppx  Heme: Stable H/H.   ID: course of antibiotics completed  Therapy: OT/PT    Dispo planning

## 2020-06-11 PROCEDURE — 99232 SBSQ HOSP IP/OBS MODERATE 35: CPT

## 2020-06-11 RX ADMIN — ENOXAPARIN SODIUM 40 MILLIGRAM(S): 100 INJECTION SUBCUTANEOUS at 17:03

## 2020-06-11 RX ADMIN — Medication 2 MILLIGRAM(S): at 22:04

## 2020-06-11 RX ADMIN — Medication 90 MILLIGRAM(S): at 17:03

## 2020-06-11 RX ADMIN — QUETIAPINE FUMARATE 100 MILLIGRAM(S): 200 TABLET, FILM COATED ORAL at 11:41

## 2020-06-11 RX ADMIN — Medication 0.5 MILLIGRAM(S): at 23:04

## 2020-06-11 RX ADMIN — ALBUTEROL 2 PUFF(S): 90 AEROSOL, METERED ORAL at 15:16

## 2020-06-11 RX ADMIN — CHLORHEXIDINE GLUCONATE 15 MILLILITER(S): 213 SOLUTION TOPICAL at 17:04

## 2020-06-11 RX ADMIN — Medication 90 MILLIGRAM(S): at 11:41

## 2020-06-11 RX ADMIN — Medication 90 MILLIGRAM(S): at 05:31

## 2020-06-11 RX ADMIN — GABAPENTIN 400 MILLIGRAM(S): 400 CAPSULE ORAL at 05:31

## 2020-06-11 RX ADMIN — CHLORHEXIDINE GLUCONATE 15 MILLILITER(S): 213 SOLUTION TOPICAL at 05:31

## 2020-06-11 RX ADMIN — Medication 100 MILLIGRAM(S): at 11:41

## 2020-06-11 RX ADMIN — QUETIAPINE FUMARATE 150 MILLIGRAM(S): 200 TABLET, FILM COATED ORAL at 22:04

## 2020-06-11 RX ADMIN — Medication 90 MILLIGRAM(S): at 23:04

## 2020-06-11 RX ADMIN — BUDESONIDE AND FORMOTEROL FUMARATE DIHYDRATE 2 PUFF(S): 160; 4.5 AEROSOL RESPIRATORY (INHALATION) at 15:16

## 2020-06-11 RX ADMIN — CLOPIDOGREL BISULFATE 75 MILLIGRAM(S): 75 TABLET, FILM COATED ORAL at 11:40

## 2020-06-11 RX ADMIN — Medication 0.5 MILLIGRAM(S): at 05:31

## 2020-06-11 RX ADMIN — ENOXAPARIN SODIUM 40 MILLIGRAM(S): 100 INJECTION SUBCUTANEOUS at 05:31

## 2020-06-11 RX ADMIN — ALBUTEROL 2 PUFF(S): 90 AEROSOL, METERED ORAL at 20:44

## 2020-06-11 RX ADMIN — Medication 0.5 MILLIGRAM(S): at 17:03

## 2020-06-11 RX ADMIN — Medication 0.5 MILLIGRAM(S): at 11:41

## 2020-06-11 RX ADMIN — GABAPENTIN 400 MILLIGRAM(S): 400 CAPSULE ORAL at 22:04

## 2020-06-11 RX ADMIN — BUDESONIDE AND FORMOTEROL FUMARATE DIHYDRATE 2 PUFF(S): 160; 4.5 AEROSOL RESPIRATORY (INHALATION) at 20:44

## 2020-06-11 RX ADMIN — FAMOTIDINE 20 MILLIGRAM(S): 10 INJECTION INTRAVENOUS at 17:03

## 2020-06-11 RX ADMIN — FAMOTIDINE 20 MILLIGRAM(S): 10 INJECTION INTRAVENOUS at 05:31

## 2020-06-11 RX ADMIN — GABAPENTIN 400 MILLIGRAM(S): 400 CAPSULE ORAL at 13:36

## 2020-06-11 RX ADMIN — Medication 81 MILLIGRAM(S): at 11:40

## 2020-06-11 NOTE — PROGRESS NOTE ADULT - SUBJECTIVE AND OBJECTIVE BOX
Subjective:    pat on vent, on PS, lying in bed, on 1:1.    Home Medications:  acetaminophen 325 mg oral tablet: 2 tab(s) orally every 6 hours, As needed, Temp greater or equal to 38.5C (101.3F) (02 Jun 2020 12:53)  albuterol 90 mcg/inh inhalation aerosol: 2 puff(s) inhaled every 4 hours (02 Jun 2020 12:53)  ALPRAZolam 0.5 mg oral tablet: 1 tab(s) orally every 6 hours (02 Jun 2020 12:53)  aspirin 81 mg oral tablet, chewable: 1 tab(s) orally once a day (02 Jun 2020 12:53)  clopidogrel 75 mg oral tablet: 1 tab(s) orally once a day (02 Jun 2020 12:53)  dilTIAZem 90 mg oral tablet: 1 tab(s) orally every 6 hours (02 Jun 2020 12:53)  doxazosin 2 mg oral tablet: 1 tab(s) orally once a day (at bedtime) (02 Jun 2020 12:53)  gabapentin 400 mg oral capsule: 1 cap(s) orally 3 times a day (02 Jun 2020 12:53)  pantoprazole 40 mg oral granule, enteric coated: 40 milligram(s) orally once a day (19 Apr 2020 03:12)  QUEtiapine 100 mg oral tablet: 1 tab(s) orally once a day (at bedtime) (02 Jun 2020 12:53)  QUEtiapine 50 mg oral tablet: 1 tab(s) orally once a day (02 Jun 2020 12:53)  Spiriva 18 mcg inhalation capsule: 1 cap(s) inhaled once a day (19 Apr 2020 03:12)  thiamine 100 mg oral tablet: 1 tab(s) orally once a day (02 Jun 2020 12:53)    MEDICATIONS  (STANDING):  ALBUTerol    90 MICROgram(s) HFA Inhaler 2 Puff(s) Inhalation every 4 hours  ALPRAZolam 0.5 milliGRAM(s) Oral every 6 hours  aspirin  chewable 81 milliGRAM(s) Oral daily  budesonide 160 MICROgram(s)/formoterol 4.5 MICROgram(s) Inhaler 2 Puff(s) Inhalation two times a day  chlorhexidine 0.12% Liquid 15 milliLiter(s) Oral Mucosa every 12 hours  clopidogrel Tablet 75 milliGRAM(s) Oral daily  diltiazem    Tablet 90 milliGRAM(s) Oral every 6 hours  doxazosin 2 milliGRAM(s) Oral at bedtime  enoxaparin Injectable 40 milliGRAM(s) SubCutaneous every 12 hours  famotidine    Tablet 20 milliGRAM(s) Oral two times a day  gabapentin 400 milliGRAM(s) Oral three times a day  QUEtiapine 100 milliGRAM(s) Oral daily  QUEtiapine 150 milliGRAM(s) Oral at bedtime  thiamine 100 milliGRAM(s) Oral daily  tiotropium 18 MICROgram(s) Capsule 1 Capsule(s) Inhalation daily    MEDICATIONS  (PRN):  acetaminophen   Tablet .. 650 milliGRAM(s) Oral every 6 hours PRN Temp greater or equal to 38.5C (101.3F)  ibuprofen  Tablet. 400 milliGRAM(s) Oral every 6 hours PRN Moderate Pain (4 - 6)      Allergies    Ceclor (Rash)  Duricef (Rash)    Intolerances        Vital Signs Last 24 Hrs  T(C): 37.3 (11 Jun 2020 08:00), Max: 37.3 (11 Jun 2020 08:00)  T(F): 99.2 (11 Jun 2020 08:00), Max: 99.2 (11 Jun 2020 08:00)  HR: 98 (11 Jun 2020 11:00) (69 - 129)  BP: 127/83 (11 Jun 2020 11:00) (100/86 - 156/75)  BP(mean): 90 (11 Jun 2020 11:00) (65 - 95)  RR: 28 (11 Jun 2020 11:00) (20 - 33)  SpO2: 100% (11 Jun 2020 11:00) (94% - 100%)  Mode: Spontaneous/ CPAP (Continuous Positive Airway Pressure)  FiO2: 30  PEEP: 5  PS: 10  MAP: 9  PIP: 18      PHYSICAL EXAMINATION:    NECK:  Supple. No lymphadenopathy. Jugular venous pressure not elevated. Carotids equal.   HEART:   The cardiac impulse has a normal quality. Reg., Nl S1 and S2.  There are no murmurs, rubs or gallops noted  CHEST:  Chest is clear to auscultation. Normal respiratory effort.  ABDOMEN:  Soft and nontender.   EXTREMITIES:  There is no edema.       LABS:

## 2020-06-11 NOTE — PROGRESS NOTE ADULT - SUBJECTIVE AND OBJECTIVE BOX
Subjective:  Pt seen, restrained. Awaiting placement. ON pressure support vent.    Vital Signs:  Vital Signs Last 24 Hrs  T(C): 37.3 (06-11-20 @ 08:00), Max: 37.3 (06-11-20 @ 08:00)  T(F): 99.2 (06-11-20 @ 08:00), Max: 99.2 (06-11-20 @ 08:00)  HR: 93 (06-11-20 @ 12:00) (69 - 129)  BP: 124/78 (06-11-20 @ 12:00) (100/86 - 156/75)  RR: 22 (06-11-20 @ 12:00) (20 - 33)  SpO2: 100% (06-11-20 @ 12:00) (94% - 100%) on (O2)    Telemetry/Alarms:    Relevant labs, radiology and Medications reviewed            MEDICATIONS  (STANDING):  ALBUTerol    90 MICROgram(s) HFA Inhaler 2 Puff(s) Inhalation every 4 hours  ALPRAZolam 0.5 milliGRAM(s) Oral every 6 hours  aspirin  chewable 81 milliGRAM(s) Oral daily  budesonide 160 MICROgram(s)/formoterol 4.5 MICROgram(s) Inhaler 2 Puff(s) Inhalation two times a day  chlorhexidine 0.12% Liquid 15 milliLiter(s) Oral Mucosa every 12 hours  clopidogrel Tablet 75 milliGRAM(s) Oral daily  diltiazem    Tablet 90 milliGRAM(s) Oral every 6 hours  doxazosin 2 milliGRAM(s) Oral at bedtime  enoxaparin Injectable 40 milliGRAM(s) SubCutaneous every 12 hours  famotidine    Tablet 20 milliGRAM(s) Oral two times a day  gabapentin 400 milliGRAM(s) Oral three times a day  QUEtiapine 100 milliGRAM(s) Oral daily  QUEtiapine 150 milliGRAM(s) Oral at bedtime  thiamine 100 milliGRAM(s) Oral daily  tiotropium 18 MICROgram(s) Capsule 1 Capsule(s) Inhalation daily    MEDICATIONS  (PRN):  acetaminophen   Tablet .. 650 milliGRAM(s) Oral every 6 hours PRN Temp greater or equal to 38.5C (101.3F)  ibuprofen  Tablet. 400 milliGRAM(s) Oral every 6 hours PRN Moderate Pain (4 - 6)      Physical exam  Gen: NAD  Neuro: follows commands  Card: RRR  neck- supple, trach midline  Pulm: equal bilaterally  Abd: Soft PEG in place  Ext: mild edema    I&O's Summary    10 Jackson 2020 07:01  -  11 Jun 2020 07:00  --------------------------------------------------------  IN: 2830 mL / OUT: 2200 mL / NET: 630 mL    11 Jun 2020 07:01  -  11 Jun 2020 12:12  --------------------------------------------------------  IN: 60 mL / OUT: 0 mL / NET: 60 mL        Assessment  65y Male  w/ PAST MEDICAL & SURGICAL HISTORY:  Chronic CHF  COPD without exacerbation  Atrial fibrillation  Presence of Watchman left atrial appendage closure device  Hypertension  GERD (gastroesophageal reflux disease)  Chronic obstructive pulmonary disease (COPD)  Anemia  Falls  Meningitis  Collapsed lung  Alcohol withdrawal  Emphysema of lung  Cirrhosis  CHF (congestive heart failure)  Poor historian  Alcohol abuse  Chronic atrial fibrillation  Unilateral amputation of lower extremity below knee  Presence of Watchman left atrial appendage closure device  S/P BKA (below knee amputation) unilateral, left  admitted with complaints of Patient is a 65y old  Male who presents with a chief complaint of acute hypoxemic, hypercapneic resp failure  COPD exacerbation (11 Jun 2020 11:56)  .  65M h/o diastolic CHF, ETOH, COPD admitted with acute respiratory failure due to COPD exacerbation complicated by pneumonia.  Underwent trach/PEG on 5/15/20. Persistent MRSA bacteremia w negative blood cultures 5/29. MYA no vegetations. Now off ABX. Awaiting placement.      trach/PEG care      Discussed with Cardiothoracic Team at AM rounds.

## 2020-06-11 NOTE — PROGRESS NOTE ADULT - SUBJECTIVE AND OBJECTIVE BOX
[facility]  ICU # 48    CC:  Respiratory Failure     66 y/o M admitted on 4/18 with COPD exacerbation -- RRT called on 4/23 for AMS requiring intubation  extubated on 4/26 then re intubated on 4/29.    S/P Trach and PEG on 5/15 and has failed SBT.    Most recent active issues is persistent MSSA sepsis and CRP in sputum-- treated with IV vanco and Cipro.  No central access      5/22:  remains intubated, encephalopathy.  (+) MSSA + in Bcx  5/23:  Tolerating limited PSV - persistent poor mentation.  Repeat BCx sent.  Abx changed to Ancef  5/24:  Rapid AFib O/N.  Still Encephalopathy.  COVID sent.  5/23 BCx still P.    5/25: cultures from 5/23 remain NGTD - COVID swab again negative on 5/24.    5/26: unrevealing MYA - NO evidence of vegetations.    Case and in particular, last 7 days discussed with Dr Dorado who has been caring for the patient.    6/8: Pt recent hospital course has been complicated by encephalopathy and delirium that limit his ability for placement in appropriate rehab or nursing facilities.  Despite attempts at non-pharmacologic interventions coupled with Xanax, haldol, and Seroquel,  pt still requiring restraints much of the time to prevent dislodging of his trach and/or PEG and potential harm to self.  Trach changed today by Thoracic sx team.  TF in progress.      6/10: pt remains grossly unchanged.  Hemodynamics reasonable.  Seems less restless/agitated than he has been in the recent past.  Did poorly with attempt at PSV yesterday - became tachycardic and mildly distressed.  Will try again today.    6/11: pt still pulling at devices yesterday when not restrained - constant observation personnel had to persistently intervene.   Pt again restrained.   Otherwise grossly unchanged.      PMHx - chronic AFib w Watchman device, CHF, COPD, HTN, obesity     Allergies    Ceclor (Rash)  Duricef (Rash)      ICU Vital Signs Last 24 Hrs  T(C): 37.3 (11 Jun 2020 08:00), Max: 37.3 (11 Jun 2020 08:00)  T(F): 99.2 (11 Jun 2020 08:00), Max: 99.2 (11 Jun 2020 08:00)  HR: 88 (11 Jun 2020 10:00) (69 - 129)  BP: 118/70 (11 Jun 2020 10:00) (100/86 - 156/75)  BP(mean): 80 (11 Jun 2020 10:00) (65 - 95)  RR: 27 (11 Jun 2020 10:00) (20 - 33)  SpO2: 100% (11 Jun 2020 10:00) (94% - 100%)      Mode: Spontaneous/ CPAP (Continuous Positive Airway Pressure)  FiO2: 30  PEEP: 5  PS: 10  MAP: 9  PIP: 18      I&O's Summary    10 Jackson 2020 07:01  -  11 Jun 2020 07:00  --------------------------------------------------------  IN: 2830 mL / OUT: 2200 mL / NET: 630 mL    11 Jun 2020 07:01  -  11 Jun 2020 10:30  --------------------------------------------------------  IN: 60 mL / OUT: 0 mL / NET: 60 mL      MEDICATIONS  (STANDING):  ALBUTerol    90 MICROgram(s) HFA Inhaler 2 Puff(s) Inhalation every 4 hours  ALPRAZolam 0.5 milliGRAM(s) Oral every 6 hours  aspirin  chewable 81 milliGRAM(s) Oral daily  budesonide 160 MICROgram(s)/formoterol 4.5 MICROgram(s) Inhaler 2 Puff(s) Inhalation two times a day  chlorhexidine 0.12% Liquid 15 milliLiter(s) Oral Mucosa every 12 hours  clopidogrel Tablet 75 milliGRAM(s) Oral daily  diltiazem    Tablet 90 milliGRAM(s) Oral every 6 hours  doxazosin 2 milliGRAM(s) Oral at bedtime  enoxaparin Injectable 40 milliGRAM(s) SubCutaneous every 12 hours  famotidine    Tablet 20 milliGRAM(s) Oral two times a day  gabapentin 400 milliGRAM(s) Oral three times a day  QUEtiapine 100 milliGRAM(s) Oral daily  QUEtiapine 150 milliGRAM(s) Oral at bedtime  thiamine 100 milliGRAM(s) Oral daily  tiotropium 18 MICROgram(s) Capsule 1 Capsule(s) Inhalation daily    MEDICATIONS  (PRN):  acetaminophen   Tablet .. 650 milliGRAM(s) Oral every 6 hours PRN Temp greater or equal to 38.5C (101.3F)  ibuprofen  Tablet. 400 milliGRAM(s) Oral every 6 hours PRN Moderate Pain (4 - 6)        Review of Systems:   · Additional ROS	Unobtainable due to clinical condition	    Physical Exam:   · Constitutional	detailed exam	  · Constitutional Details	ill; restless but poorly cooperative	  · ENMT	detailed exam	  · ENMT Comments	Trach in situ connected to Vent	  · Respiratory	detailed exam	  · Respiratory Details	breath sounds equal B/L; NO sig W/R/R	  · Cardiovascular	detailed exam	  · Cardiovascular Details	irregular rate and rhythm	  · Gastrointestinal	detailed exam	  · GI Normal	soft; NT/ND (+) BS	  · GI Abnormal	+ PEG in situ; Site is CDI	  · Extremities	detailed exam	  · Extremities Details	no cyanosis; LLE BKA	  · Neurological	detailed exam	  · Neurological Details	Encephalopathy persists; minimally interactive; MRAIE spont NO gross motor or sensory deficits	  · Skin	detailed exam	  · Skin Details	warm and dry	        DVT Prophylaxis: Lovenox q12h, venodynes    Visit Information:  SSQ

## 2020-06-11 NOTE — PROGRESS NOTE ADULT - ASSESSMENT
66 y/o M admitted on 4/18 with COPD exacerbation -- RRT called on 4/23 for AMS requiring intubation  extubated on 4/26 then re intubated on 4/29.    S/P Trach and PEG on 5/15 and has failed SBT.    Most recent active issues is persistent MSSA sepsis and CRP in sputum-- s/p IV vanco and Cipro.  No central access    transitioned to Ancef which completed on 6/6 (14 days from time of 1st neg culture)  COVID Negative on 5/24, COVID-19 on 5/30 was NEGATIVE - in prep for 6/1 discharge    pt continues to suffer from:  chronic resp failure requiring ventilator support  TM Encephalopathy with restlessness/agitation  delirium  concern for endocarditis as cause of bacteremia with MSSA - NO evidence of vegetations on MYA 5/26.  Blood cultures 5/23 & 5/29:  No growth  atrial fibrillation RVR - better controlled at present  urinary retention - resolved with Cardura      PMHx - chronic AFib w Watchman device, CHF, COPD, HTN, obesity     Plan at this time is for discharge to NH/rehab when appropriate,  VAP prevention bundle  SBT as tolerated daily +/- TC if re remains stable  Seroquel - titrate to control restlessness / agitation as possible  Xanax 0.5g q 12  Cont ASA for CAD and AFib-- Diltiazem 90mg q6   Plavix    stopped (5/26) Apixiban - No need with Watchman device  chemical VTE prophylaxis Lovenox 40mg SC q12h based on BMI  Increase Seroquel to 150 mg HS and 100 mg daily  HOB > 30  Continue Cardura for urinary retention  failed trial of no restraints and persistently tried to remove PEG/Trach while under constant observation   Reapply restraints to curtail attempts to pull/remove medical devices.    Supportive care  ICU care--d/w ICU staff on multi disciplinary rounds  Discussed with social work - discharge planning.    Spoke with daughter who is aware of issues/plans and pending discharge to NH.    Attending Attestation:   30 minutes spent on total encounter; more than 50% of the visit was spent counseling and/or coordinating care by the attending physician.

## 2020-06-12 PROCEDURE — 99232 SBSQ HOSP IP/OBS MODERATE 35: CPT

## 2020-06-12 RX ORDER — POLYETHYLENE GLYCOL 3350 17 G/17G
17 POWDER, FOR SOLUTION ORAL DAILY
Refills: 0 | Status: DISCONTINUED | OUTPATIENT
Start: 2020-06-12 | End: 2020-07-08

## 2020-06-12 RX ORDER — QUETIAPINE FUMARATE 200 MG/1
50 TABLET, FILM COATED ORAL DAILY
Refills: 0 | Status: DISCONTINUED | OUTPATIENT
Start: 2020-06-12 | End: 2020-06-20

## 2020-06-12 RX ORDER — SENNA PLUS 8.6 MG/1
2 TABLET ORAL AT BEDTIME
Refills: 0 | Status: DISCONTINUED | OUTPATIENT
Start: 2020-06-12 | End: 2020-07-08

## 2020-06-12 RX ADMIN — Medication 0.5 MILLIGRAM(S): at 11:36

## 2020-06-12 RX ADMIN — GABAPENTIN 400 MILLIGRAM(S): 400 CAPSULE ORAL at 22:14

## 2020-06-12 RX ADMIN — CLOPIDOGREL BISULFATE 75 MILLIGRAM(S): 75 TABLET, FILM COATED ORAL at 11:36

## 2020-06-12 RX ADMIN — QUETIAPINE FUMARATE 150 MILLIGRAM(S): 200 TABLET, FILM COATED ORAL at 11:36

## 2020-06-12 RX ADMIN — Medication 0.5 MILLIGRAM(S): at 05:52

## 2020-06-12 RX ADMIN — CHLORHEXIDINE GLUCONATE 15 MILLILITER(S): 213 SOLUTION TOPICAL at 05:52

## 2020-06-12 RX ADMIN — GABAPENTIN 400 MILLIGRAM(S): 400 CAPSULE ORAL at 05:52

## 2020-06-12 RX ADMIN — Medication 90 MILLIGRAM(S): at 12:06

## 2020-06-12 RX ADMIN — FAMOTIDINE 20 MILLIGRAM(S): 10 INJECTION INTRAVENOUS at 18:27

## 2020-06-12 RX ADMIN — QUETIAPINE FUMARATE 150 MILLIGRAM(S): 200 TABLET, FILM COATED ORAL at 22:14

## 2020-06-12 RX ADMIN — CHLORHEXIDINE GLUCONATE 15 MILLILITER(S): 213 SOLUTION TOPICAL at 18:28

## 2020-06-12 RX ADMIN — GABAPENTIN 400 MILLIGRAM(S): 400 CAPSULE ORAL at 13:34

## 2020-06-12 RX ADMIN — ALBUTEROL 2 PUFF(S): 90 AEROSOL, METERED ORAL at 08:56

## 2020-06-12 RX ADMIN — Medication 2 MILLIGRAM(S): at 22:14

## 2020-06-12 RX ADMIN — Medication 100 MILLIGRAM(S): at 11:36

## 2020-06-12 RX ADMIN — BUDESONIDE AND FORMOTEROL FUMARATE DIHYDRATE 2 PUFF(S): 160; 4.5 AEROSOL RESPIRATORY (INHALATION) at 08:56

## 2020-06-12 RX ADMIN — BUDESONIDE AND FORMOTEROL FUMARATE DIHYDRATE 2 PUFF(S): 160; 4.5 AEROSOL RESPIRATORY (INHALATION) at 20:34

## 2020-06-12 RX ADMIN — Medication 0.5 MILLIGRAM(S): at 18:27

## 2020-06-12 RX ADMIN — ENOXAPARIN SODIUM 40 MILLIGRAM(S): 100 INJECTION SUBCUTANEOUS at 18:27

## 2020-06-12 RX ADMIN — POLYETHYLENE GLYCOL 3350 17 GRAM(S): 17 POWDER, FOR SOLUTION ORAL at 11:36

## 2020-06-12 RX ADMIN — ALBUTEROL 2 PUFF(S): 90 AEROSOL, METERED ORAL at 23:47

## 2020-06-12 RX ADMIN — SENNA PLUS 2 TABLET(S): 8.6 TABLET ORAL at 22:14

## 2020-06-12 RX ADMIN — ALBUTEROL 2 PUFF(S): 90 AEROSOL, METERED ORAL at 00:42

## 2020-06-12 RX ADMIN — Medication 90 MILLIGRAM(S): at 05:52

## 2020-06-12 RX ADMIN — FAMOTIDINE 20 MILLIGRAM(S): 10 INJECTION INTRAVENOUS at 05:52

## 2020-06-12 RX ADMIN — Medication 90 MILLIGRAM(S): at 18:27

## 2020-06-12 RX ADMIN — ENOXAPARIN SODIUM 40 MILLIGRAM(S): 100 INJECTION SUBCUTANEOUS at 05:52

## 2020-06-12 RX ADMIN — Medication 81 MILLIGRAM(S): at 11:36

## 2020-06-12 RX ADMIN — ALBUTEROL 2 PUFF(S): 90 AEROSOL, METERED ORAL at 19:07

## 2020-06-12 NOTE — PROGRESS NOTE ADULT - SUBJECTIVE AND OBJECTIVE BOX
Subjective:    pat on vent with PS, no respiratory distress.    Home Medications:  acetaminophen 325 mg oral tablet: 2 tab(s) orally every 6 hours, As needed, Temp greater or equal to 38.5C (101.3F) (02 Jun 2020 12:53)  albuterol 90 mcg/inh inhalation aerosol: 2 puff(s) inhaled every 4 hours (02 Jun 2020 12:53)  ALPRAZolam 0.5 mg oral tablet: 1 tab(s) orally every 6 hours (02 Jun 2020 12:53)  aspirin 81 mg oral tablet, chewable: 1 tab(s) orally once a day (02 Jun 2020 12:53)  clopidogrel 75 mg oral tablet: 1 tab(s) orally once a day (02 Jun 2020 12:53)  dilTIAZem 90 mg oral tablet: 1 tab(s) orally every 6 hours (02 Jun 2020 12:53)  doxazosin 2 mg oral tablet: 1 tab(s) orally once a day (at bedtime) (02 Jun 2020 12:53)  gabapentin 400 mg oral capsule: 1 cap(s) orally 3 times a day (02 Jun 2020 12:53)  pantoprazole 40 mg oral granule, enteric coated: 40 milligram(s) orally once a day (19 Apr 2020 03:12)  QUEtiapine 100 mg oral tablet: 1 tab(s) orally once a day (at bedtime) (02 Jun 2020 12:53)  QUEtiapine 50 mg oral tablet: 1 tab(s) orally once a day (02 Jun 2020 12:53)  Spiriva 18 mcg inhalation capsule: 1 cap(s) inhaled once a day (19 Apr 2020 03:12)  thiamine 100 mg oral tablet: 1 tab(s) orally once a day (02 Jun 2020 12:53)    MEDICATIONS  (STANDING):  ALBUTerol    90 MICROgram(s) HFA Inhaler 2 Puff(s) Inhalation every 4 hours  ALPRAZolam 0.5 milliGRAM(s) Oral every 6 hours  aspirin  chewable 81 milliGRAM(s) Oral daily  budesonide 160 MICROgram(s)/formoterol 4.5 MICROgram(s) Inhaler 2 Puff(s) Inhalation two times a day  chlorhexidine 0.12% Liquid 15 milliLiter(s) Oral Mucosa every 12 hours  clopidogrel Tablet 75 milliGRAM(s) Oral daily  diltiazem    Tablet 90 milliGRAM(s) Oral every 6 hours  doxazosin 2 milliGRAM(s) Oral at bedtime  enoxaparin Injectable 40 milliGRAM(s) SubCutaneous every 12 hours  famotidine    Tablet 20 milliGRAM(s) Oral two times a day  gabapentin 400 milliGRAM(s) Oral three times a day  polyethylene glycol 3350 17 Gram(s) Oral daily  QUEtiapine 150 milliGRAM(s) Oral daily  QUEtiapine 150 milliGRAM(s) Oral at bedtime  senna 2 Tablet(s) Oral at bedtime  thiamine 100 milliGRAM(s) Oral daily  tiotropium 18 MICROgram(s) Capsule 1 Capsule(s) Inhalation daily    MEDICATIONS  (PRN):  acetaminophen   Tablet .. 650 milliGRAM(s) Oral every 6 hours PRN Temp greater or equal to 38.5C (101.3F)  ibuprofen  Tablet. 400 milliGRAM(s) Oral every 6 hours PRN Moderate Pain (4 - 6)      Allergies    Ceclor (Rash)  Duricef (Rash)    Intolerances        Vital Signs Last 24 Hrs  T(C): 37.3 (12 Jun 2020 08:00), Max: 37.4 (11 Jun 2020 12:00)  T(F): 99.1 (12 Jun 2020 08:00), Max: 99.4 (11 Jun 2020 12:00)  HR: 95 (12 Jun 2020 11:00) (70 - 99)  BP: 132/81 (12 Jun 2020 11:00) (88/60 - 132/81)  BP(mean): 91 (12 Jun 2020 11:00) (64 - 91)  RR: 29 (12 Jun 2020 11:00) (18 - 31)  SpO2: 100% (12 Jun 2020 11:00) (96% - 100%)  Mode: PS (Pressure Support)/ Spontaneous  FiO2: 30  PEEP: 5  PS: 10  PIP: 16      PHYSICAL EXAMINATION:    NECK:  Supple. No lymphadenopathy. Jugular venous pressure not elevated. Carotids equal.   HEART:   The cardiac impulse has a normal quality. Reg., Nl S1 and S2.  There are no murmurs, rubs or gallops noted  CHEST:  Chest crackles to auscultation. Normal respiratory effort.  ABDOMEN:  Soft and nontender.   EXTREMITIES:  There is no edema.       LABS:

## 2020-06-12 NOTE — PROGRESS NOTE ADULT - ASSESSMENT
PROBLEMS;    s/p staph bactermia/sputum pseudomonas/staph  Ac on chronic hypercapnic & hypoxamic respiratory failure-s/p trach & PEG  sputum-Few Pseudomonas aeruginosa (Carbapenem Resistant)/Moderate Methicillin resistant Staphylococcus aureus  afib with RVR  [facility]AY  AC flare of COPD/chronic vs acute on chronic bronchitis  Mild interstitial lung disease-NSIP h/o smoking  COVID negativex2  Pulmonary hypertension  Nonobstructing stone in the left ureterovesicular junction/ nonobstructing stone in the lower pole right kidney.  Subacute hemorrhage in the right rectus abdominis along the anterior sheath, measures 1.6 cm in thickness and extends from the umbilicus to the inferior myotendinous junction  Cirrhosis  Mild splenomegaly-portal venous hypertension.  Chronic afib w Watchman device  CHF  BPH  GERD  ETOH abuse  seizures disorder    PLAN;    pulmonary slow recovery-on vent - PS as tolerated-waiting placement for rehab  Tube feeding as tolerated  supportive care  covid repeat testing-neg  Eliquis/asa 81  d/w staff

## 2020-06-12 NOTE — PROGRESS NOTE ADULT - SUBJECTIVE AND OBJECTIVE BOX
[facility]  ICU # 49    CC:  Respiratory Failure     64 y/o M admitted on 4/18 with COPD exacerbation -- RRT called on 4/23 for AMS requiring intubation  extubated on 4/26 then re intubated on 4/29.    S/P Trach and PEG on 5/15 and has failed SBT.    Most recent active issues is persistent MSSA sepsis and CRP in sputum-- treated with IV vanco and Cipro.  No central access      5/22:  remains intubated, encephalopathy.  (+) MSSA + in Bcx  5/23:  Tolerating limited PSV - persistent poor mentation.  Repeat BCx sent.  Abx changed to Ancef  5/24:  Rapid AFib O/N.  Still Encephalopathy.  COVID sent.  5/23 BCx still P.    5/25: cultures from 5/23 remain NGTD - COVID swab again negative on 5/24.    5/26: unrevealing MYA - NO evidence of vegetations.    Case and in particular, last 7 days discussed with Dr Dorado who has been caring for the patient.    6/8: Pt recent hospital course has been complicated by encephalopathy and delirium that limit his ability for placement in appropriate rehab or nursing facilities.  Despite attempts at non-pharmacologic interventions coupled with Xanax, haldol, and Seroquel,  pt still requiring restraints much of the time to prevent dislodging of his trach and/or PEG and potential harm to self.  Trach changed today by Thoracic sx team.  TF in progress.      6/10: pt remains grossly unchanged.  Hemodynamics reasonable.  Seems less restless/agitated than he has been in the recent past.  Did poorly with attempt at PSV yesterday - became tachycardic and mildly distressed.  Will try again today.    6/11: pt still pulling at devices yesterday when not restrained - constant observation personnel had to persistently intervene.   Pt again restrained.   Otherwise grossly unchanged.      6/12: No new issues, no overnight events.  Hemodynamics reasonable.  Still requiring restraints to prevent dislodgement.      PMHx - chronic AFib w Watchman device, CHF, COPD, HTN, obesity           Allergies    Ceclor (Rash)  Duricef (Rash)      ICU Vital Signs Last 24 Hrs  T(C): 37.3 (12 Jun 2020 08:00), Max: 37.4 (11 Jun 2020 12:00)  T(F): 99.1 (12 Jun 2020 08:00), Max: 99.4 (11 Jun 2020 12:00)  HR: 92 (12 Jun 2020 08:00) (70 - 99)  BP: 126/44 (12 Jun 2020 08:00) (88/60 - 132/78)  BP(mean): 64 (12 Jun 2020 08:00) (64 - 91)  RR: 18 (12 Jun 2020 08:00) (18 - 31)  SpO2: 100% (12 Jun 2020 08:00) (96% - 100%)      Mode: Spontaneous/ CPAP (Continuous Positive Airway Pressure)  FiO2: 30  PEEP: 5  PS: 10  ITime: 1  PIP: 20      I&O's Summary    11 Jun 2020 07:01  -  12 Jun 2020 07:00  --------------------------------------------------------  IN: 1717 mL / OUT: 1800 mL / NET: -83 mL    12 Jun 2020 07:01  -  12 Jun 2020 08:56  --------------------------------------------------------  IN: 0 mL / OUT: 375 mL / NET: -375 mL      MEDICATIONS  (STANDING):  ALBUTerol    90 MICROgram(s) HFA Inhaler 2 Puff(s) Inhalation every 4 hours  ALPRAZolam 0.5 milliGRAM(s) Oral every 6 hours  aspirin  chewable 81 milliGRAM(s) Oral daily  budesonide 160 MICROgram(s)/formoterol 4.5 MICROgram(s) Inhaler 2 Puff(s) Inhalation two times a day  chlorhexidine 0.12% Liquid 15 milliLiter(s) Oral Mucosa every 12 hours  clopidogrel Tablet 75 milliGRAM(s) Oral daily  diltiazem    Tablet 90 milliGRAM(s) Oral every 6 hours  doxazosin 2 milliGRAM(s) Oral at bedtime  enoxaparin Injectable 40 milliGRAM(s) SubCutaneous every 12 hours  famotidine    Tablet 20 milliGRAM(s) Oral two times a day  gabapentin 400 milliGRAM(s) Oral three times a day  polyethylene glycol 3350 17 Gram(s) Oral daily  QUEtiapine 150 milliGRAM(s) Oral daily  QUEtiapine 150 milliGRAM(s) Oral at bedtime  senna 2 Tablet(s) Oral at bedtime  thiamine 100 milliGRAM(s) Oral daily  tiotropium 18 MICROgram(s) Capsule 1 Capsule(s) Inhalation daily    MEDICATIONS  (PRN):  acetaminophen   Tablet .. 650 milliGRAM(s) Oral every 6 hours PRN Temp greater or equal to 38.5C (101.3F)  ibuprofen  Tablet. 400 milliGRAM(s) Oral every 6 hours PRN Moderate Pain (4 - 6)          Review of Systems:   · Additional ROS	Unobtainable due to clinical condition	    Physical Exam:   · Constitutional	detailed exam	  · Constitutional Details	ill; restless but poorly cooperative	  · ENMT	detailed exam	  · ENMT Comments	Trach in situ connected to Vent	  · Respiratory	detailed exam	  · Respiratory Details	breath sounds equal B/L; NO sig W/R/R	  · Cardiovascular	detailed exam	  · Cardiovascular Details	irregular rate and rhythm	  · Gastrointestinal	detailed exam	  · GI Normal	soft; NT/ND (+) BS	  · GI Abnormal	+ PEG in situ; Site is CDI	  · Extremities	detailed exam	  · Extremities Details	no cyanosis; LLE BKA	  · Neurological	detailed exam	  · Neurological Details	Encephalopathy persists; minimally interactive; MARIE spont NO gross motor or sensory deficits	  · Skin	detailed exam	  · Skin Details	warm and dry	        DVT Prophylaxis: Lovenox q12h, venodynes    Visit Information:  SSQ

## 2020-06-12 NOTE — PROGRESS NOTE ADULT - SUBJECTIVE AND OBJECTIVE BOX
Subjective:  Pt seen, awake on pressure support vent.    Vital Signs:  Vital Signs Last 24 Hrs  T(C): 37.6 (06-12-20 @ 12:00), Max: 37.6 (06-12-20 @ 12:00)  T(F): 99.6 (06-12-20 @ 12:00), Max: 99.6 (06-12-20 @ 12:00)  HR: 107 (06-12-20 @ 14:00) (70 - 107)  BP: 135/76 (06-12-20 @ 14:00) (99/71 - 149/79)  RR: 29 (06-12-20 @ 14:00) (18 - 32)  SpO2: 97% (06-12-20 @ 14:00) (96% - 100%) on (O2)    Telemetry/Alarms:    Relevant labs, radiology and Medications reviewed            MEDICATIONS  (STANDING):  ALBUTerol    90 MICROgram(s) HFA Inhaler 2 Puff(s) Inhalation every 4 hours  ALPRAZolam 0.5 milliGRAM(s) Oral every 6 hours  aspirin  chewable 81 milliGRAM(s) Oral daily  budesonide 160 MICROgram(s)/formoterol 4.5 MICROgram(s) Inhaler 2 Puff(s) Inhalation two times a day  chlorhexidine 0.12% Liquid 15 milliLiter(s) Oral Mucosa every 12 hours  clopidogrel Tablet 75 milliGRAM(s) Oral daily  diltiazem    Tablet 90 milliGRAM(s) Oral every 6 hours  doxazosin 2 milliGRAM(s) Oral at bedtime  enoxaparin Injectable 40 milliGRAM(s) SubCutaneous every 12 hours  famotidine    Tablet 20 milliGRAM(s) Oral two times a day  gabapentin 400 milliGRAM(s) Oral three times a day  polyethylene glycol 3350 17 Gram(s) Oral daily  QUEtiapine 150 milliGRAM(s) Oral daily  QUEtiapine 150 milliGRAM(s) Oral at bedtime  senna 2 Tablet(s) Oral at bedtime  thiamine 100 milliGRAM(s) Oral daily  tiotropium 18 MICROgram(s) Capsule 1 Capsule(s) Inhalation daily    MEDICATIONS  (PRN):  acetaminophen   Tablet .. 650 milliGRAM(s) Oral every 6 hours PRN Temp greater or equal to 38.5C (101.3F)  ibuprofen  Tablet. 400 milliGRAM(s) Oral every 6 hours PRN Moderate Pain (4 - 6)      Physical exam  Gen NAD  Neuro Alert  neck- supple, trach midline  Card RRR  Pulm equal b/l  Abd soft, PEG in place  Ext warm, left BKA        I&O's Summary    11 Jun 2020 07:01  -  12 Jun 2020 07:00  --------------------------------------------------------  IN: 1717 mL / OUT: 1800 mL / NET: -83 mL    12 Jun 2020 07:01  -  12 Jun 2020 15:19  --------------------------------------------------------  IN: 0 mL / OUT: 375 mL / NET: -375 mL        Assessment  65y Male  w/ PAST MEDICAL & SURGICAL HISTORY:  Chronic CHF  COPD without exacerbation  Atrial fibrillation  Presence of Watchman left atrial appendage closure device  Hypertension  GERD (gastroesophageal reflux disease)  Chronic obstructive pulmonary disease (COPD)  Anemia  Falls  Meningitis  Collapsed lung  Alcohol withdrawal  Emphysema of lung  Cirrhosis  CHF (congestive heart failure)  Poor historian  Alcohol abuse  Chronic atrial fibrillation  Unilateral amputation of lower extremity below knee  Presence of Watchman left atrial appendage closure device  S/P BKA (below knee amputation) unilateral, left  admitted with complaints of Patient is a 65y old  Male who presents with a chief complaint of acute hypoxemic, hypercapneic resp failure  COPD exacerbation (12 Jun 2020 11:30)  .  65M h/o diastolic CHF, ETOH, COPD admitted with acute respiratory failure due to COPD exacerbation complicated by pneumonia.  Underwent trach/PEG on 5/15/20. Persistent MRSA bacteremia w negative blood cultures 5/29. MYA no vegetations. Now off ABX. Awaiting placement.      trach/PEG care    Discussed with Cardiothoracic Team at AM rounds.

## 2020-06-12 NOTE — PROGRESS NOTE ADULT - ASSESSMENT
66 y/o M admitted on 4/18 with COPD exacerbation -- RRT called on 4/23 for AMS requiring intubation  extubated on 4/26 then re intubated on 4/29.    S/P Trach and PEG on 5/15 and has failed SBT.    Most recent active issues is persistent MSSA sepsis and CRP in sputum-- s/p IV vanco and Cipro.  No central access    transitioned to Ancef which completed on 6/6 (14 days from time of 1st neg culture)  COVID Negative on 5/24, COVID-19 on 5/30 was NEGATIVE - in prep for 6/1 discharge    pt continues to suffer from:  chronic resp failure requiring ventilator support  TM Encephalopathy with restlessness/agitation  delirium  concern for endocarditis as cause of bacteremia with MSSA - NO evidence of vegetations on MYA 5/26.  Blood cultures 5/23 & 5/29:  No growth  atrial fibrillation RVR - better controlled at present  urinary retention - resolved with Cardura      PMHx - chronic AFib w Watchman device, CHF, COPD, HTN, obesity     Plan at this time is for discharge to NH/rehab when appropriate,  VAP prevention bundle  SBT as tolerated daily +/- TC if re remains stable  Seroquel - titrate to control restlessness / agitation as possible  Xanax 0.5g q 12  Cont ASA for CAD and AFib-- Diltiazem 90mg q6   Plavix    stopped (5/26) Apixiban - No need with Watchman device  chemical VTE prophylaxis Lovenox 40mg SC q12h based on BMI  Increase Seroquel to 150 mg HS and 150mg daily  HOB > 30  Continue Cardura for urinary retention  failed trial of no restraints and persistently tried to remove PEG/Trach while under constant observation   Reapply restraints to curtail attempts to pull/remove medical devices.  Bowel regimen    Supportive care  ICU care--d/w ICU staff on multi disciplinary rounds  Discussed with social work - discharge planning.    Spoke with daughter who is aware of issues/plans and pending discharge to NH.    Attending Attestation:   30 minutes spent on total encounter; more than 50% of the visit was spent counseling and/or coordinating care by the attending physician.

## 2020-06-13 PROCEDURE — 99232 SBSQ HOSP IP/OBS MODERATE 35: CPT

## 2020-06-13 RX ORDER — ALPRAZOLAM 0.25 MG
0.5 TABLET ORAL EVERY 6 HOURS
Refills: 0 | Status: DISCONTINUED | OUTPATIENT
Start: 2020-06-13 | End: 2020-06-20

## 2020-06-13 RX ADMIN — FAMOTIDINE 20 MILLIGRAM(S): 10 INJECTION INTRAVENOUS at 05:23

## 2020-06-13 RX ADMIN — ENOXAPARIN SODIUM 40 MILLIGRAM(S): 100 INJECTION SUBCUTANEOUS at 05:23

## 2020-06-13 RX ADMIN — Medication 0.5 MILLIGRAM(S): at 00:23

## 2020-06-13 RX ADMIN — Medication 90 MILLIGRAM(S): at 17:11

## 2020-06-13 RX ADMIN — Medication 0.5 MILLIGRAM(S): at 05:23

## 2020-06-13 RX ADMIN — Medication 90 MILLIGRAM(S): at 23:03

## 2020-06-13 RX ADMIN — QUETIAPINE FUMARATE 150 MILLIGRAM(S): 200 TABLET, FILM COATED ORAL at 11:10

## 2020-06-13 RX ADMIN — ALBUTEROL 2 PUFF(S): 90 AEROSOL, METERED ORAL at 03:05

## 2020-06-13 RX ADMIN — CHLORHEXIDINE GLUCONATE 15 MILLILITER(S): 213 SOLUTION TOPICAL at 17:11

## 2020-06-13 RX ADMIN — GABAPENTIN 400 MILLIGRAM(S): 400 CAPSULE ORAL at 14:57

## 2020-06-13 RX ADMIN — Medication 100 MILLIGRAM(S): at 11:10

## 2020-06-13 RX ADMIN — POLYETHYLENE GLYCOL 3350 17 GRAM(S): 17 POWDER, FOR SOLUTION ORAL at 11:09

## 2020-06-13 RX ADMIN — Medication 81 MILLIGRAM(S): at 11:10

## 2020-06-13 RX ADMIN — ENOXAPARIN SODIUM 40 MILLIGRAM(S): 100 INJECTION SUBCUTANEOUS at 17:11

## 2020-06-13 RX ADMIN — CHLORHEXIDINE GLUCONATE 15 MILLILITER(S): 213 SOLUTION TOPICAL at 05:24

## 2020-06-13 RX ADMIN — Medication 0.5 MILLIGRAM(S): at 17:11

## 2020-06-13 RX ADMIN — SENNA PLUS 2 TABLET(S): 8.6 TABLET ORAL at 23:06

## 2020-06-13 RX ADMIN — GABAPENTIN 400 MILLIGRAM(S): 400 CAPSULE ORAL at 22:37

## 2020-06-13 RX ADMIN — Medication 0.5 MILLIGRAM(S): at 11:10

## 2020-06-13 RX ADMIN — Medication 90 MILLIGRAM(S): at 11:10

## 2020-06-13 RX ADMIN — Medication 0.5 MILLIGRAM(S): at 23:03

## 2020-06-13 RX ADMIN — QUETIAPINE FUMARATE 150 MILLIGRAM(S): 200 TABLET, FILM COATED ORAL at 22:36

## 2020-06-13 RX ADMIN — GABAPENTIN 400 MILLIGRAM(S): 400 CAPSULE ORAL at 05:23

## 2020-06-13 RX ADMIN — CLOPIDOGREL BISULFATE 75 MILLIGRAM(S): 75 TABLET, FILM COATED ORAL at 11:10

## 2020-06-13 RX ADMIN — ALBUTEROL 2 PUFF(S): 90 AEROSOL, METERED ORAL at 13:48

## 2020-06-13 RX ADMIN — Medication 2 MILLIGRAM(S): at 22:36

## 2020-06-13 RX ADMIN — FAMOTIDINE 20 MILLIGRAM(S): 10 INJECTION INTRAVENOUS at 17:11

## 2020-06-13 RX ADMIN — Medication 90 MILLIGRAM(S): at 05:23

## 2020-06-13 RX ADMIN — Medication 90 MILLIGRAM(S): at 00:23

## 2020-06-13 RX ADMIN — BUDESONIDE AND FORMOTEROL FUMARATE DIHYDRATE 2 PUFF(S): 160; 4.5 AEROSOL RESPIRATORY (INHALATION) at 08:29

## 2020-06-13 RX ADMIN — ALBUTEROL 2 PUFF(S): 90 AEROSOL, METERED ORAL at 08:29

## 2020-06-13 NOTE — PROGRESS NOTE ADULT - ASSESSMENT
66 y/o M admitted on 4/18 with COPD exacerbation -- RRT called on 4/23 for AMS requiring intubation  extubated on 4/26 then re intubated on 4/29.    S/P Trach and PEG on 5/15 and has failed SBT.    Most recent active issues is persistent MSSA sepsis and CRP in sputum-- s/p IV vanco and Cipro.  No central access    transitioned to Ancef which completed on 6/6 (14 days from time of 1st neg culture)  COVID Negative on 5/24, COVID-19 on 5/30 was NEGATIVE - in prep for 6/1 discharge    pt continues to suffer from:  chronic resp failure requiring ventilator support  TM Encephalopathy with restlessness/agitation  delirium  concern for endocarditis as cause of bacteremia with MSSA - NO evidence of vegetations on MYA 5/26.  Blood cultures 5/23 & 5/29:  No growth  atrial fibrillation RVR - better controlled at present  urinary retention - resolved with Cardura      PMHx - chronic AFib w Watchman device, CHF, COPD, HTN, obesity     Plan at this time is for discharge to NH/rehab when appropriate,  VAP prevention bundle  SBT as tolerated daily +/- TC if re remains stable  Seroquel - titrate to control restlessness / agitation as possible  Xanax 0.5g q 12  Cont ASA for CAD and AFib-- Diltiazem 90mg q6   Plavix    stopped (5/26) Apixiban - No need with Watchman device  chemical VTE prophylaxis Lovenox 40mg SC q12h based on BMI  Seroquel 150 mg HS and 150mg daily  HOB > 30  Continue Cardura for urinary retention  failed trial of no restraints and persistently tried to remove PEG/Trach while under constant observation   Reapply restraints to curtail attempts to pull/remove medical devices.  Bowel regimen    Supportive care  ICU care--d/w ICU staff on multi disciplinary rounds  Discussed with social work - discharge planning.    Spoke with daughter who is aware of issues/plans and pending discharge to NH.    Attending Attestation:   30 minutes spent on total encounter; more than 50% of the visit was spent counseling and/or coordinating care by the attending physician.

## 2020-06-13 NOTE — CHART NOTE - NSCHARTNOTEFT_GEN_A_CORE
2000: Attempted ABG X4. Unable to obtain at that time. PA requested End tidal CO2 in absence of ABG. Monitored ETCO2 Q4hrs and WNL all night.

## 2020-06-13 NOTE — PROGRESS NOTE ADULT - SUBJECTIVE AND OBJECTIVE BOX
Subjective:    pat better, on trach collar, agitated.    Home Medications:  acetaminophen 325 mg oral tablet: 2 tab(s) orally every 6 hours, As needed, Temp greater or equal to 38.5C (101.3F) (02 Jun 2020 12:53)  albuterol 90 mcg/inh inhalation aerosol: 2 puff(s) inhaled every 4 hours (02 Jun 2020 12:53)  ALPRAZolam 0.5 mg oral tablet: 1 tab(s) orally every 6 hours (02 Jun 2020 12:53)  aspirin 81 mg oral tablet, chewable: 1 tab(s) orally once a day (02 Jun 2020 12:53)  clopidogrel 75 mg oral tablet: 1 tab(s) orally once a day (02 Jun 2020 12:53)  dilTIAZem 90 mg oral tablet: 1 tab(s) orally every 6 hours (02 Jun 2020 12:53)  doxazosin 2 mg oral tablet: 1 tab(s) orally once a day (at bedtime) (02 Jun 2020 12:53)  gabapentin 400 mg oral capsule: 1 cap(s) orally 3 times a day (02 Jun 2020 12:53)  pantoprazole 40 mg oral granule, enteric coated: 40 milligram(s) orally once a day (19 Apr 2020 03:12)  QUEtiapine 100 mg oral tablet: 1 tab(s) orally once a day (at bedtime) (02 Jun 2020 12:53)  QUEtiapine 50 mg oral tablet: 1 tab(s) orally once a day (02 Jun 2020 12:53)  Spiriva 18 mcg inhalation capsule: 1 cap(s) inhaled once a day (19 Apr 2020 03:12)  thiamine 100 mg oral tablet: 1 tab(s) orally once a day (02 Jun 2020 12:53)    MEDICATIONS  (STANDING):  ALBUTerol    90 MICROgram(s) HFA Inhaler 2 Puff(s) Inhalation every 4 hours  ALPRAZolam 0.5 milliGRAM(s) Oral every 6 hours  aspirin  chewable 81 milliGRAM(s) Oral daily  budesonide 160 MICROgram(s)/formoterol 4.5 MICROgram(s) Inhaler 2 Puff(s) Inhalation two times a day  chlorhexidine 0.12% Liquid 15 milliLiter(s) Oral Mucosa every 12 hours  clopidogrel Tablet 75 milliGRAM(s) Oral daily  diltiazem    Tablet 90 milliGRAM(s) Oral every 6 hours  doxazosin 2 milliGRAM(s) Oral at bedtime  enoxaparin Injectable 40 milliGRAM(s) SubCutaneous every 12 hours  famotidine    Tablet 20 milliGRAM(s) Oral two times a day  gabapentin 400 milliGRAM(s) Oral three times a day  polyethylene glycol 3350 17 Gram(s) Oral daily  QUEtiapine 150 milliGRAM(s) Oral daily  QUEtiapine 150 milliGRAM(s) Oral at bedtime  senna 2 Tablet(s) Oral at bedtime  thiamine 100 milliGRAM(s) Oral daily  tiotropium 18 MICROgram(s) Capsule 1 Capsule(s) Inhalation daily    MEDICATIONS  (PRN):  acetaminophen   Tablet .. 650 milliGRAM(s) Oral every 6 hours PRN Temp greater or equal to 38.5C (101.3F)  ibuprofen  Tablet. 400 milliGRAM(s) Oral every 6 hours PRN Moderate Pain (4 - 6)      Allergies    Ceclor (Rash)  Duricef (Rash)    Intolerances        Vital Signs Last 24 Hrs  T(C): 37.3 (13 Jun 2020 06:00), Max: 37.7 (12 Jun 2020 16:00)  T(F): 99.2 (13 Jun 2020 06:00), Max: 99.9 (12 Jun 2020 16:00)  HR: 95 (13 Jun 2020 10:00) (79 - 109)  BP: 122/87 (13 Jun 2020 10:00) (95/67 - 149/79)  BP(mean): 90 (13 Jun 2020 10:00) (69 - 92)  RR: 28 (13 Jun 2020 10:00) (22 - 36)  SpO2: 100% (13 Jun 2020 10:00) (94% - 100%)  Mode: standby      PHYSICAL EXAMINATION:    NECK:  Supple. No lymphadenopathy. Jugular venous pressure not elevated. Carotids equal.   HEART:   The cardiac impulse has a normal quality. Reg., Nl S1 and S2.  There are no murmurs, rubs or gallops noted  CHEST:  Chest crackles to auscultation. Normal respiratory effort.  ABDOMEN:  Soft and nontender.   EXTREMITIES:  There is no edema.       LABS:

## 2020-06-13 NOTE — PROGRESS NOTE ADULT - SUBJECTIVE AND OBJECTIVE BOX
Subjective:  Still agitated and requiring restraints to prevent decannulation.    Vital Signs:  Vital Signs Last 24 Hrs  T(C): 37.3 (06-13-20 @ 06:00), Max: 37.7 (06-12-20 @ 16:00)  T(F): 99.2 (06-13-20 @ 06:00), Max: 99.9 (06-12-20 @ 16:00)  HR: 95 (06-13-20 @ 10:00) (79 - 109)  BP: 122/87 (06-13-20 @ 10:00) (95/67 - 149/79)  RR: 28 (06-13-20 @ 10:00) (22 - 36)  SpO2: 100% (06-13-20 @ 10:00) (94% - 100%) on TC      Relevant labs, radiology and Medications reviewed      MEDICATIONS  (STANDING):  ALBUTerol    90 MICROgram(s) HFA Inhaler 2 Puff(s) Inhalation every 4 hours  ALPRAZolam 0.5 milliGRAM(s) Oral every 6 hours  aspirin  chewable 81 milliGRAM(s) Oral daily  budesonide 160 MICROgram(s)/formoterol 4.5 MICROgram(s) Inhaler 2 Puff(s) Inhalation two times a day  chlorhexidine 0.12% Liquid 15 milliLiter(s) Oral Mucosa every 12 hours  clopidogrel Tablet 75 milliGRAM(s) Oral daily  diltiazem    Tablet 90 milliGRAM(s) Oral every 6 hours  doxazosin 2 milliGRAM(s) Oral at bedtime  enoxaparin Injectable 40 milliGRAM(s) SubCutaneous every 12 hours  famotidine    Tablet 20 milliGRAM(s) Oral two times a day  gabapentin 400 milliGRAM(s) Oral three times a day  polyethylene glycol 3350 17 Gram(s) Oral daily  QUEtiapine 150 milliGRAM(s) Oral daily  QUEtiapine 150 milliGRAM(s) Oral at bedtime  senna 2 Tablet(s) Oral at bedtime  thiamine 100 milliGRAM(s) Oral daily  tiotropium 18 MICROgram(s) Capsule 1 Capsule(s) Inhalation daily    MEDICATIONS  (PRN):  acetaminophen   Tablet .. 650 milliGRAM(s) Oral every 6 hours PRN Temp greater or equal to 38.5C (101.3F)  ibuprofen  Tablet. 400 milliGRAM(s) Oral every 6 hours PRN Moderate Pain (4 - 6)      Physical Exam    General: WD, WN, NAD                                                         Neuro: A+O                   Eyes: PERRL, EOMI   ENT: Normal exam of nasal/oral mucosa with absence of cyanosis  Neck: supple, no JVD, trachea midline with trach in place  Chest: CTA, equal bilaterally  CV: +S1S2  GI: soft, NT, ND, +BS, no organomegaly noted - PEG in place  Extremities: MARIE x 4  SKIN: warm, dry, intact      I&O's Summary    12 Jun 2020 07:01  -  13 Jun 2020 07:00  --------------------------------------------------------  IN: 2106 mL / OUT: 1525 mL / NET: 581 mL      Assessment  65y Male  w/ PAST MEDICAL & SURGICAL HISTORY:  Chronic CHF  COPD without exacerbation  Atrial fibrillation  Presence of Watchman left atrial appendage closure device  Hypertension  GERD (gastroesophageal reflux disease)  Chronic obstructive pulmonary disease (COPD)  Anemia  Falls  Meningitis  Collapsed lung  Alcohol withdrawal  Emphysema of lung  Cirrhosis  CHF (congestive heart failure)  Poor historian  Alcohol abuse  Chronic atrial fibrillation  Unilateral amputation of lower extremity below knee  Presence of Watchman left atrial appendage closure device  S/P BKA (below knee amputation) unilateral, left  65y old  Male who presents with a chief complaint of acute hypoxemic, hypercapneic resp failure  COPD exacerbation (10 Jackson 2020 09:54)    1.  COPD with acute exacerbation  2.  Acute on chronic respiratory failure requiring tracheostomy  3.  MRSA/pseudomonas in sputum  4.  Tracheostomy change due to cuff leak      PLAN  Neuro: Anxiolytics as per ICU team  Pulm:  Continue TC  Cardio: Monitor telemetry/alarms  GI: Tube feeds to goal  Renal: monitor urine output, supplement electrolytes as needed  Vasc: Lovenox  for DVT ppx  Heme: Stable H/H.   ID: course of antibiotics completed  Therapy: OT/PT    Dispo planning

## 2020-06-13 NOTE — PROGRESS NOTE ADULT - SUBJECTIVE AND OBJECTIVE BOX
[facility]  ICU # 50    CC:  Respiratory Failure     66 y/o M admitted on 4/18 with COPD exacerbation -- RRT called on 4/23 for AMS requiring intubation  extubated on 4/26 then re intubated on 4/29.    S/P Trach and PEG on 5/15 and has failed SBT.    Most recent active issues is persistent MSSA sepsis and CRP in sputum-- treated with IV vanco and Cipro.  No central access      5/22:  remains intubated, encephalopathy.  (+) MSSA + in Bcx  5/23:  Tolerating limited PSV - persistent poor mentation.  Repeat BCx sent.  Abx changed to Ancef  5/24:  Rapid AFib O/N.  Still Encephalopathy.  COVID sent.  5/23 BCx still P.    5/25: cultures from 5/23 remain NGTD - COVID swab again negative on 5/24.    5/26: unrevealing MYA - NO evidence of vegetations.    Case and in particular, last 7 days discussed with Dr Dorado who has been caring for the patient.    6/8: Pt recent hospital course has been complicated by encephalopathy and delirium that limit his ability for placement in appropriate rehab or nursing facilities.  Despite attempts at non-pharmacologic interventions coupled with Xanax, haldol, and Seroquel,  pt still requiring restraints much of the time to prevent dislodging of his trach and/or PEG and potential harm to self.  Trach changed today by Thoracic sx team.  TF in progress.      6/10: pt remains grossly unchanged.  Hemodynamics reasonable.  Seems less restless/agitated than he has been in the recent past.  Did poorly with attempt at PSV yesterday - became tachycardic and mildly distressed.  Will try again today.    6/11: pt still pulling at devices yesterday when not restrained - constant observation personnel had to persistently intervene.   Pt again restrained.   Otherwise grossly unchanged.      6/13: No new issues, no overnight events.  Hemodynamics reasonable.  Still requiring restraints to prevent dislodgement.      PMHx - chronic AFib w Watchman device, CHF, COPD, HTN, obesity     Allergies    Ceclor (Rash)  Duricef (Rash)      ICU Vital Signs Last 24 Hrs  T(C): 37.3 (13 Jun 2020 06:00), Max: 37.7 (12 Jun 2020 16:00)  T(F): 99.2 (13 Jun 2020 06:00), Max: 99.9 (12 Jun 2020 16:00)  HR: 86 (13 Jun 2020 07:00) (79 - 109)  BP: 95/67 (13 Jun 2020 07:00) (95/67 - 149/79)  BP(mean): 74 (13 Jun 2020 07:00) (69 - 92)  RR: 34 (13 Jun 2020 07:00) (22 - 36)  SpO2: 96% (13 Jun 2020 07:00) (94% - 100%)      Mode: standby      I&O's Summary    12 Jun 2020 07:01  -  13 Jun 2020 07:00  --------------------------------------------------------  IN: 2106 mL / OUT: 1525 mL / NET: 581 mL            MEDICATIONS  (STANDING):  ALBUTerol    90 MICROgram(s) HFA Inhaler 2 Puff(s) Inhalation every 4 hours  ALPRAZolam 0.5 milliGRAM(s) Oral every 6 hours  aspirin  chewable 81 milliGRAM(s) Oral daily  budesonide 160 MICROgram(s)/formoterol 4.5 MICROgram(s) Inhaler 2 Puff(s) Inhalation two times a day  chlorhexidine 0.12% Liquid 15 milliLiter(s) Oral Mucosa every 12 hours  clopidogrel Tablet 75 milliGRAM(s) Oral daily  diltiazem    Tablet 90 milliGRAM(s) Oral every 6 hours  doxazosin 2 milliGRAM(s) Oral at bedtime  enoxaparin Injectable 40 milliGRAM(s) SubCutaneous every 12 hours  famotidine    Tablet 20 milliGRAM(s) Oral two times a day  gabapentin 400 milliGRAM(s) Oral three times a day  polyethylene glycol 3350 17 Gram(s) Oral daily  QUEtiapine 150 milliGRAM(s) Oral daily  QUEtiapine 150 milliGRAM(s) Oral at bedtime  senna 2 Tablet(s) Oral at bedtime  thiamine 100 milliGRAM(s) Oral daily  tiotropium 18 MICROgram(s) Capsule 1 Capsule(s) Inhalation daily    MEDICATIONS  (PRN):  acetaminophen   Tablet .. 650 milliGRAM(s) Oral every 6 hours PRN Temp greater or equal to 38.5C (101.3F)  ibuprofen  Tablet. 400 milliGRAM(s) Oral every 6 hours PRN Moderate Pain (4 - 6)            Review of Systems:   · Additional ROS	Unobtainable due to clinical condition	    Physical Exam:   · Constitutional	detailed exam	  · Constitutional Details	ill; restless but poorly cooperative	  · ENMT	detailed exam	  · ENMT Comments	Trach in situ connected to Vent	  · Respiratory	detailed exam	  · Respiratory Details	breath sounds equal B/L; NO sig W/R/R	  · Cardiovascular	detailed exam	  · Cardiovascular Details	irregular rate and rhythm	  · Gastrointestinal	detailed exam	  · GI Normal	soft; NT/ND (+) BS	  · GI Abnormal	+ PEG in situ; Site is CDI	  · Extremities	detailed exam	  · Extremities Details	no cyanosis; LLE BKA	  · Neurological	detailed exam	  · Neurological Details	Encephalopathy persists; minimally interactive; MARIE spont NO gross motor or sensory deficits	  · Skin	detailed exam	  · Skin Details	warm and dry	        DVT Prophylaxis: Lovenox q12h, venodynes    Visit Information:  [facility]  [facility] # 50    CC:  Respiratory Failure     64 y/o M admitted on 4/18 with COPD exacerbation -- RRT called on 4/23 for AMS requiring intubation  extubated on 4/26 then re intubated on 4/29.    S/P Trach and PEG on 5/15 and has failed SBT.    Most recent active issues is persistent MSSA sepsis and CRP in sputum-- treated with IV vanco and Cipro.  No central access      5/22:  remains intubated, encephalopathy.  (+) MSSA + in Bcx  5/23:  Tolerating limited PSV - persistent poor mentation.  Repeat BCx sent.  Abx changed to Ancef  5/24:  Rapid AFib O/N.  Still Encephalopathy.  COVID sent.  5/23 BCx still P.    5/25: cultures from 5/23 remain NGTD - COVID swab again negative on 5/24.    5/26: unrevealing MYA - NO evidence of vegetations.    Case and in particular, last 7 days discussed with Dr Dorado who has been caring for the patient.    6/8: Pt recent hospital course has been complicated by encephalopathy and delirium that limit his ability for placement in appropriate rehab or nursing facilities.  Despite attempts at non-pharmacologic interventions coupled with Xanax, haldol, and Seroquel,  pt still requiring restraints much of the time to prevent dislodging of his trach and/or PEG and potential harm to self.  Trach changed today by Thoracic sx team.  TF in progress.      6/10: pt remains grossly unchanged.  Hemodynamics reasonable.  Seems less restless/agitated than he has been in the recent past.  Did poorly with attempt at PSV yesterday - became tachycardic and mildly distressed.  Will try again today.    6/11: pt still pulling at devices yesterday when not restrained - constant observation personnel had to persistently intervene.   Pt again restrained.   Otherwise grossly unchanged.      6/13:  Tolerating TC without issue for approx. 24 hrs.  EtCO2 overnight WNL.  No new issues, no overnight events.  Hemodynamics reasonable.  Still requiring restraints to prevent dislodgement.      PMHx - chronic AFib w Watchman device, CHF, COPD, HTN, obesity     Allergies    Ceclor (Rash)  Duricef (Rash)      ICU Vital Signs Last 24 Hrs  T(C): 37.3 (13 Jun 2020 06:00), Max: 37.7 (12 Jun 2020 16:00)  T(F): 99.2 (13 Jun 2020 06:00), Max: 99.9 (12 Jun 2020 16:00)  HR: 86 (13 Jun 2020 07:00) (79 - 109)  BP: 95/67 (13 Jun 2020 07:00) (95/67 - 149/79)  BP(mean): 74 (13 Jun 2020 07:00) (69 - 92)  RR: 34 (13 Jun 2020 07:00) (22 - 36)  SpO2: 96% (13 Jun 2020 07:00) (94% - 100%)      Mode: standby      I&O's Summary    12 Jun 2020 07:01  -  13 Jun 2020 07:00  --------------------------------------------------------  IN: 2106 mL / OUT: 1525 mL / NET: 581 mL            MEDICATIONS  (STANDING):  ALBUTerol    90 MICROgram(s) HFA Inhaler 2 Puff(s) Inhalation every 4 hours  ALPRAZolam 0.5 milliGRAM(s) Oral every 6 hours  aspirin  chewable 81 milliGRAM(s) Oral daily  budesonide 160 MICROgram(s)/formoterol 4.5 MICROgram(s) Inhaler 2 Puff(s) Inhalation two times a day  chlorhexidine 0.12% Liquid 15 milliLiter(s) Oral Mucosa every 12 hours  clopidogrel Tablet 75 milliGRAM(s) Oral daily  diltiazem    Tablet 90 milliGRAM(s) Oral every 6 hours  doxazosin 2 milliGRAM(s) Oral at bedtime  enoxaparin Injectable 40 milliGRAM(s) SubCutaneous every 12 hours  famotidine    Tablet 20 milliGRAM(s) Oral two times a day  gabapentin 400 milliGRAM(s) Oral three times a day  polyethylene glycol 3350 17 Gram(s) Oral daily  QUEtiapine 150 milliGRAM(s) Oral daily  QUEtiapine 150 milliGRAM(s) Oral at bedtime  senna 2 Tablet(s) Oral at bedtime  thiamine 100 milliGRAM(s) Oral daily  tiotropium 18 MICROgram(s) Capsule 1 Capsule(s) Inhalation daily    MEDICATIONS  (PRN):  acetaminophen   Tablet .. 650 milliGRAM(s) Oral every 6 hours PRN Temp greater or equal to 38.5C (101.3F)  ibuprofen  Tablet. 400 milliGRAM(s) Oral every 6 hours PRN Moderate Pain (4 - 6)            Review of Systems:   · Additional ROS	Unobtainable due to clinical condition	    Physical Exam:   · Constitutional	detailed exam	  · Constitutional Details	ill; restless but poorly cooperative	  · ENMT	detailed exam	  · ENMT Comments	Trach in situ connected to Vent	  · Respiratory	detailed exam	  · Respiratory Details	breath sounds equal B/L; NO sig W/R/R	  · Cardiovascular	detailed exam	  · Cardiovascular Details	irregular rate and rhythm	  · Gastrointestinal	detailed exam	  · GI Normal	soft; NT/ND (+) BS	  · GI Abnormal	+ PEG in situ; Site is CDI	  · Extremities	detailed exam	  · Extremities Details	no cyanosis; LLE BKA	  · Neurological	detailed exam	  · Neurological Details	Encephalopathy persists; minimally interactive; MARIE spont NO gross motor or sensory deficits	  · Skin	detailed exam	  · Skin Details	warm and dry	        DVT Prophylaxis: Lovenox q12h, venodynes    Visit Information:  [facility]

## 2020-06-14 PROCEDURE — 99232 SBSQ HOSP IP/OBS MODERATE 35: CPT

## 2020-06-14 RX ORDER — MINERAL OIL
133 OIL (ML) MISCELLANEOUS ONCE
Refills: 0 | Status: COMPLETED | OUTPATIENT
Start: 2020-06-14 | End: 2020-06-14

## 2020-06-14 RX ORDER — LANOLIN ALCOHOL/MO/W.PET/CERES
5 CREAM (GRAM) TOPICAL AT BEDTIME
Refills: 0 | Status: DISCONTINUED | OUTPATIENT
Start: 2020-06-14 | End: 2020-07-08

## 2020-06-14 RX ORDER — QUETIAPINE FUMARATE 200 MG/1
200 TABLET, FILM COATED ORAL AT BEDTIME
Refills: 0 | Status: DISCONTINUED | OUTPATIENT
Start: 2020-06-14 | End: 2020-06-21

## 2020-06-14 RX ADMIN — Medication 0.5 MILLIGRAM(S): at 05:15

## 2020-06-14 RX ADMIN — Medication 90 MILLIGRAM(S): at 11:39

## 2020-06-14 RX ADMIN — QUETIAPINE FUMARATE 200 MILLIGRAM(S): 200 TABLET, FILM COATED ORAL at 22:14

## 2020-06-14 RX ADMIN — Medication 0.5 MILLIGRAM(S): at 17:06

## 2020-06-14 RX ADMIN — CHLORHEXIDINE GLUCONATE 15 MILLILITER(S): 213 SOLUTION TOPICAL at 17:05

## 2020-06-14 RX ADMIN — Medication 2 MILLIGRAM(S): at 22:14

## 2020-06-14 RX ADMIN — CLOPIDOGREL BISULFATE 75 MILLIGRAM(S): 75 TABLET, FILM COATED ORAL at 11:36

## 2020-06-14 RX ADMIN — BUDESONIDE AND FORMOTEROL FUMARATE DIHYDRATE 2 PUFF(S): 160; 4.5 AEROSOL RESPIRATORY (INHALATION) at 08:26

## 2020-06-14 RX ADMIN — ALBUTEROL 2 PUFF(S): 90 AEROSOL, METERED ORAL at 12:25

## 2020-06-14 RX ADMIN — Medication 0.5 MILLIGRAM(S): at 23:12

## 2020-06-14 RX ADMIN — Medication 133 MILLILITER(S): at 11:36

## 2020-06-14 RX ADMIN — Medication 400 MILLIGRAM(S): at 11:36

## 2020-06-14 RX ADMIN — SENNA PLUS 2 TABLET(S): 8.6 TABLET ORAL at 22:14

## 2020-06-14 RX ADMIN — ALBUTEROL 2 PUFF(S): 90 AEROSOL, METERED ORAL at 16:13

## 2020-06-14 RX ADMIN — QUETIAPINE FUMARATE 150 MILLIGRAM(S): 200 TABLET, FILM COATED ORAL at 11:38

## 2020-06-14 RX ADMIN — CHLORHEXIDINE GLUCONATE 15 MILLILITER(S): 213 SOLUTION TOPICAL at 05:15

## 2020-06-14 RX ADMIN — ENOXAPARIN SODIUM 40 MILLIGRAM(S): 100 INJECTION SUBCUTANEOUS at 05:15

## 2020-06-14 RX ADMIN — FAMOTIDINE 20 MILLIGRAM(S): 10 INJECTION INTRAVENOUS at 17:06

## 2020-06-14 RX ADMIN — GABAPENTIN 400 MILLIGRAM(S): 400 CAPSULE ORAL at 15:35

## 2020-06-14 RX ADMIN — ENOXAPARIN SODIUM 40 MILLIGRAM(S): 100 INJECTION SUBCUTANEOUS at 17:05

## 2020-06-14 RX ADMIN — Medication 90 MILLIGRAM(S): at 23:10

## 2020-06-14 RX ADMIN — Medication 5 MILLIGRAM(S): at 22:14

## 2020-06-14 RX ADMIN — FAMOTIDINE 20 MILLIGRAM(S): 10 INJECTION INTRAVENOUS at 05:15

## 2020-06-14 RX ADMIN — TIOTROPIUM BROMIDE 1 CAPSULE(S): 18 CAPSULE ORAL; RESPIRATORY (INHALATION) at 08:29

## 2020-06-14 RX ADMIN — Medication 81 MILLIGRAM(S): at 11:36

## 2020-06-14 RX ADMIN — Medication 90 MILLIGRAM(S): at 05:15

## 2020-06-14 RX ADMIN — GABAPENTIN 400 MILLIGRAM(S): 400 CAPSULE ORAL at 05:15

## 2020-06-14 RX ADMIN — Medication 0.5 MILLIGRAM(S): at 11:36

## 2020-06-14 RX ADMIN — Medication 90 MILLIGRAM(S): at 17:06

## 2020-06-14 RX ADMIN — GABAPENTIN 400 MILLIGRAM(S): 400 CAPSULE ORAL at 22:14

## 2020-06-14 RX ADMIN — ALBUTEROL 2 PUFF(S): 90 AEROSOL, METERED ORAL at 21:28

## 2020-06-14 RX ADMIN — BUDESONIDE AND FORMOTEROL FUMARATE DIHYDRATE 2 PUFF(S): 160; 4.5 AEROSOL RESPIRATORY (INHALATION) at 21:28

## 2020-06-14 RX ADMIN — Medication 100 MILLIGRAM(S): at 11:36

## 2020-06-14 RX ADMIN — ALBUTEROL 2 PUFF(S): 90 AEROSOL, METERED ORAL at 08:23

## 2020-06-14 RX ADMIN — POLYETHYLENE GLYCOL 3350 17 GRAM(S): 17 POWDER, FOR SOLUTION ORAL at 11:38

## 2020-06-14 NOTE — PROGRESS NOTE ADULT - SUBJECTIVE AND OBJECTIVE BOX
[facility] [id number]  ICU # 51    CC:  Respiratory Failure     66 y/o M admitted on 4/18 with COPD exacerbation -- RRT called on 4/23 for AMS requiring intubation  extubated on 4/26 then re intubated on 4/29.    S/P Trach and PEG on 5/15 and has failed SBT.    Most recent active issues is persistent MSSA sepsis and CRP in sputum-- treated with IV vanco and Cipro.  No central access      5/22:  remains intubated, encephalopathy.  (+) MSSA + in Bcx  5/23:  Tolerating limited PSV - persistent poor mentation.  Repeat BCx sent.  Abx changed to Ancef  5/24:  Rapid AFib O/N.  Still Encephalopathy.  COVID sent.  5/23 BCx still P.    5/25: cultures from 5/23 remain NGTD - COVID swab again negative on 5/24.    5/26: unrevealing MYA - NO evidence of vegetations.    Case and in particular, last 7 days discussed with Dr Dorado who has been caring for the patient.    6/8: Pt recent hospital course has been complicated by encephalopathy and delirium that limit his ability for placement in appropriate rehab or nursing facilities.  Despite attempts at non-pharmacologic interventions coupled with Xanax, haldol, and Seroquel,  pt still requiring restraints much of the time to prevent dislodging of his trach and/or PEG and potential harm to self.  Trach changed today by Thoracic sx team.  TF in progress.      6/13:  Tolerating TC without issue for approx. 24 hrs.  EtCO2 overnight WNL.  OOB to chair (mariluz lift).    6/14: remains on TC.  No new issues, no overnight events.  Hemodynamics reasonable.  Still requiring restraints to prevent dislodgement.        PMHx - chronic AFib w Watchman device, CHF, COPD, HTN, obesity       Allergies    Ceclor (Rash)  Duricef (Rash)      ICU Vital Signs Last 24 Hrs  T(C): 36.7 (14 Jun 2020 04:00), Max: 36.8 (13 Jun 2020 22:00)  T(F): 98 (14 Jun 2020 04:00), Max: 98.2 (13 Jun 2020 22:00)  HR: 118 (14 Jun 2020 06:00) (84 - 128)  BP: 67/49 (14 Jun 2020 06:00) (67/49 - 144/72)  BP(mean): 53 (14 Jun 2020 06:00) (53 - 119)  RR: 28 (14 Jun 2020 06:00) (23 - 36)  SpO2: 100% (14 Jun 2020 06:00) (93% - 100%)          I&O's Summary    13 Jun 2020 07:01  -  14 Jun 2020 07:00  --------------------------------------------------------  IN: 2768 mL / OUT: 800 mL / NET: 1968 mL      MEDICATIONS  (STANDING):  ALBUTerol    90 MICROgram(s) HFA Inhaler 2 Puff(s) Inhalation every 4 hours  ALPRAZolam 0.5 milliGRAM(s) Oral every 6 hours  aspirin  chewable 81 milliGRAM(s) Oral daily  budesonide 160 MICROgram(s)/formoterol 4.5 MICROgram(s) Inhaler 2 Puff(s) Inhalation two times a day  chlorhexidine 0.12% Liquid 15 milliLiter(s) Oral Mucosa every 12 hours  clopidogrel Tablet 75 milliGRAM(s) Oral daily  diltiazem    Tablet 90 milliGRAM(s) Oral every 6 hours  doxazosin 2 milliGRAM(s) Oral at bedtime  enoxaparin Injectable 40 milliGRAM(s) SubCutaneous every 12 hours  famotidine    Tablet 20 milliGRAM(s) Oral two times a day  gabapentin 400 milliGRAM(s) Oral three times a day  polyethylene glycol 3350 17 Gram(s) Oral daily  QUEtiapine 150 milliGRAM(s) Oral daily  QUEtiapine 150 milliGRAM(s) Oral at bedtime  senna 2 Tablet(s) Oral at bedtime  thiamine 100 milliGRAM(s) Oral daily  tiotropium 18 MICROgram(s) Capsule 1 Capsule(s) Inhalation daily    MEDICATIONS  (PRN):  acetaminophen   Tablet .. 650 milliGRAM(s) Oral every 6 hours PRN Temp greater or equal to 38.5C (101.3F)  ibuprofen  Tablet. 400 milliGRAM(s) Oral every 6 hours PRN Moderate Pain (4 - 6)              Review of Systems:   · Additional ROS	Unobtainable due to clinical condition	    Physical Exam:   · Constitutional	detailed exam	  · Constitutional Details	ill; restless  poorly cooperative at times but also periods of lucidity and cooperation/appropriate behavior	  · ENMT	detailed exam	  · ENMT Comments	Trach in situ - Trach collar in place	  · Respiratory	detailed exam	  · Respiratory Details	breath sounds equal B/L; NO sig W/R/R	  · Cardiovascular	detailed exam	  · Cardiovascular Details	irregular rate and rhythm	  · Gastrointestinal	detailed exam	  · GI Normal	soft; NT/ND (+) BS	  · GI Abnormal	+ PEG in situ; Site is CDI	  · Extremities	detailed exam	  · Extremities Details	no cyanosis; LLE BKA	  · Neurological	detailed exam	  · Neurological Details	Encephalopathy persists - worse at times, but at times he is AA and interactive - nodding appropraitely to questions and following simple command. MARIE spont NO gross motor or sensory deficits	  · Skin	detailed exam	  · Skin Details	warm and dry	        DVT Prophylaxis: Lovenox q12h, venodynes    Visit Information:  SSQ

## 2020-06-14 NOTE — PROGRESS NOTE ADULT - ASSESSMENT
64 y/o M admitted on 4/18 with COPD exacerbation -- RRT called on 4/23 for AMS requiring intubation  extubated on 4/26 then re intubated on 4/29.    S/P Trach and PEG on 5/15 and has failed SBT.    Most recent active issues is persistent MSSA sepsis and CRP in sputum-- s/p IV vanco and Cipro.  No central access    transitioned to Ancef which completed on 6/6 (14 days from time of 1st neg culture)  COVID Negative on 5/24, COVID-19 on 5/30 was NEGATIVE     pt continues to suffer from:  chronic resp failure - has been tolerating TC for approx 4 days without issue.  TM Encephalopathy with restlessness/agitation - waxing and waning mentation/compliance with commands/questions  suspected delirium given fluctuation in sensorium  previous concern for endocarditis as cause of bacteremia with MSSA - NO evidence of vegetations on MYA 5/26.  Blood cultures 5/23 & 5/29:  No growth  atrial fibrillation RVR - better controlled at present  urinary retention - resolved with Cardura    restlessness/pulling at devices are inhibiting his placement in appropriate rehab/NH setting    PMHx - chronic AFib w Watchman device, CHF, COPD, HTN, obesity     Plan at this time is for discharge to NH/rehab when appropriate,  HOB 30  TC as tolerated  Seroquel - titrate to control restlessness / agitation as possible  Xanax 0.5g q 12  Cont ASA for CAD and AFib-- Diltiazem 90mg q6   Plavix    stopped (5/26) Apixiban - No need with Watchman device  chemical VTE prophylaxis Lovenox 40mg SC q12h based on BMI  Seroquel 150 mg HS and 150mg daily  HOB > 30  Continue Cardura for urinary retention  failed trial of no restraints and persistently tried to remove PEG/Trach while under constant observation   restraints to curtail attempts to pull/remove medical devices - attempt to minimize or remove as pt mentation improves  Bowel regimen    Supportive care  ICU care--d/w ICU staff on multi disciplinary rounds  Discussed with social work - discharge planning.    Spoke with daughter who is aware of issues/plans and pending discharge to NH.    Attending Attestation:   30 minutes spent on total encounter; more than 50% of the visit was spent counseling and/or coordinating care by the attending physician.

## 2020-06-14 NOTE — PROGRESS NOTE ADULT - ASSESSMENT
PROBLEMS;    s/p staph bactermia/sputum pseudomonas/staph  Ac on chronic hypercapnic & hypoxamic respiratory failure-s/p trach & PEG  sputum-Few Pseudomonas aeruginosa (Carbapenem Resistant)/Moderate Methicillin resistant Staphylococcus aureus  afib with RVR  OHS/VIJAY  AC flare of COPD/chronic vs acute on chronic bronchitis  Mild interstitial lung disease-NSIP h/o smoking  COVID negativex2  Pulmonary hypertension  Nonobstructing stone in the left ureterovesicular junction/ nonobstructing stone in the lower pole right kidney.  Subacute hemorrhage in the right rectus abdominis along the anterior sheath, measures 1.6 cm in thickness and extends from the umbilicus to the inferior myotendinous junction  Cirrhosis  Mild splenomegaly-portal venous hypertension.  Chronic afib w Watchman device  CHF  BPH  GERD  ETOH abuse  seizures disorder    PLAN;    pulmonary slow recovery-on trach collar as tolerated-waiting placement for rehab  Tube feeding as tolerated  supportive care  covid repeat testing-neg  Eliquis/asa 81  d/w staff

## 2020-06-14 NOTE — PROGRESS NOTE ADULT - SUBJECTIVE AND OBJECTIVE BOX
Subjective:    pat better, on trach collar with 28%, no respiratory distress.    Home Medications:  acetaminophen 325 mg oral tablet: 2 tab(s) orally every 6 hours, As needed, Temp greater or equal to 38.5C (101.3F) (02 Jun 2020 12:53)  albuterol 90 mcg/inh inhalation aerosol: 2 puff(s) inhaled every 4 hours (02 Jun 2020 12:53)  ALPRAZolam 0.5 mg oral tablet: 1 tab(s) orally every 6 hours (02 Jun 2020 12:53)  aspirin 81 mg oral tablet, chewable: 1 tab(s) orally once a day (02 Jun 2020 12:53)  clopidogrel 75 mg oral tablet: 1 tab(s) orally once a day (02 Jun 2020 12:53)  dilTIAZem 90 mg oral tablet: 1 tab(s) orally every 6 hours (02 Jun 2020 12:53)  doxazosin 2 mg oral tablet: 1 tab(s) orally once a day (at bedtime) (02 Jun 2020 12:53)  gabapentin 400 mg oral capsule: 1 cap(s) orally 3 times a day (02 Jun 2020 12:53)  pantoprazole 40 mg oral granule, enteric coated: 40 milligram(s) orally once a day (19 Apr 2020 03:12)  QUEtiapine 100 mg oral tablet: 1 tab(s) orally once a day (at bedtime) (02 Jun 2020 12:53)  QUEtiapine 50 mg oral tablet: 1 tab(s) orally once a day (02 Jun 2020 12:53)  Spiriva 18 mcg inhalation capsule: 1 cap(s) inhaled once a day (19 Apr 2020 03:12)  thiamine 100 mg oral tablet: 1 tab(s) orally once a day (02 Jun 2020 12:53)    MEDICATIONS  (STANDING):  ALBUTerol    90 MICROgram(s) HFA Inhaler 2 Puff(s) Inhalation every 4 hours  ALPRAZolam 0.5 milliGRAM(s) Oral every 6 hours  aspirin  chewable 81 milliGRAM(s) Oral daily  budesonide 160 MICROgram(s)/formoterol 4.5 MICROgram(s) Inhaler 2 Puff(s) Inhalation two times a day  chlorhexidine 0.12% Liquid 15 milliLiter(s) Oral Mucosa every 12 hours  clopidogrel Tablet 75 milliGRAM(s) Oral daily  diltiazem    Tablet 90 milliGRAM(s) Oral every 6 hours  doxazosin 2 milliGRAM(s) Oral at bedtime  enoxaparin Injectable 40 milliGRAM(s) SubCutaneous every 12 hours  famotidine    Tablet 20 milliGRAM(s) Oral two times a day  gabapentin 400 milliGRAM(s) Oral three times a day  melatonin 5 milliGRAM(s) Oral at bedtime  polyethylene glycol 3350 17 Gram(s) Oral daily  QUEtiapine 200 milliGRAM(s) Oral at bedtime  QUEtiapine 150 milliGRAM(s) Oral daily  senna 2 Tablet(s) Oral at bedtime  thiamine 100 milliGRAM(s) Oral daily  tiotropium 18 MICROgram(s) Capsule 1 Capsule(s) Inhalation daily    MEDICATIONS  (PRN):  acetaminophen   Tablet .. 650 milliGRAM(s) Oral every 6 hours PRN Temp greater or equal to 38.5C (101.3F)  ibuprofen  Tablet. 400 milliGRAM(s) Oral every 6 hours PRN Moderate Pain (4 - 6)      Allergies    Ceclor (Rash)  Duricef (Rash)    Intolerances        Vital Signs Last 24 Hrs  T(C): 36.7 (14 Jun 2020 04:00), Max: 36.8 (13 Jun 2020 22:00)  T(F): 98 (14 Jun 2020 04:00), Max: 98.2 (13 Jun 2020 22:00)  HR: 100 (14 Jun 2020 12:27) (85 - 128)  BP: 124/44 (14 Jun 2020 10:00) (67/49 - 144/72)  BP(mean): 65 (14 Jun 2020 10:00) (53 - 119)  RR: 31 (14 Jun 2020 10:00) (22 - 36)  SpO2: 97% (14 Jun 2020 12:27) (97% - 100%)      PHYSICAL EXAMINATION:    NECK:  Supple. No lymphadenopathy. Jugular venous pressure not elevated. Carotids equal.   HEART:   The cardiac impulse has a normal quality. Reg., Nl S1 and S2.  There are no murmurs, rubs or gallops noted  CHEST:  Chest is clear to auscultation. Normal respiratory effort.  ABDOMEN:  Soft and nontender.   EXTREMITIES:  There is no edema.       LABS:

## 2020-06-15 LAB
ANION GAP SERPL CALC-SCNC: 6 MMOL/L — SIGNIFICANT CHANGE UP (ref 5–17)
APPEARANCE UR: CLEAR — SIGNIFICANT CHANGE UP
BILIRUB UR-MCNC: NEGATIVE — SIGNIFICANT CHANGE UP
BUN SERPL-MCNC: 17 MG/DL — SIGNIFICANT CHANGE UP (ref 7–23)
CALCIUM SERPL-MCNC: 9.2 MG/DL — SIGNIFICANT CHANGE UP (ref 8.5–10.1)
CHLORIDE SERPL-SCNC: 101 MMOL/L — SIGNIFICANT CHANGE UP (ref 96–108)
CO2 SERPL-SCNC: 29 MMOL/L — SIGNIFICANT CHANGE UP (ref 22–31)
COLOR SPEC: YELLOW — SIGNIFICANT CHANGE UP
CREAT SERPL-MCNC: 0.47 MG/DL — LOW (ref 0.5–1.3)
DIFF PNL FLD: ABNORMAL
GLUCOSE SERPL-MCNC: 102 MG/DL — HIGH (ref 70–99)
GLUCOSE UR QL: NEGATIVE MG/DL — SIGNIFICANT CHANGE UP
GRAM STN FLD: SIGNIFICANT CHANGE UP
HCT VFR BLD CALC: 31 % — LOW (ref 39–50)
HGB BLD-MCNC: 10.3 G/DL — LOW (ref 13–17)
KETONES UR-MCNC: ABNORMAL
LACTATE SERPL-SCNC: 0.7 MMOL/L — SIGNIFICANT CHANGE UP (ref 0.7–2)
LEUKOCYTE ESTERASE UR-ACNC: ABNORMAL
MCHC RBC-ENTMCNC: 31.2 PG — SIGNIFICANT CHANGE UP (ref 27–34)
MCHC RBC-ENTMCNC: 33.2 GM/DL — SIGNIFICANT CHANGE UP (ref 32–36)
MCV RBC AUTO: 93.9 FL — SIGNIFICANT CHANGE UP (ref 80–100)
NITRITE UR-MCNC: NEGATIVE — SIGNIFICANT CHANGE UP
PH UR: 7 — SIGNIFICANT CHANGE UP (ref 5–8)
PLATELET # BLD AUTO: 199 K/UL — SIGNIFICANT CHANGE UP (ref 150–400)
POTASSIUM SERPL-MCNC: 4 MMOL/L — SIGNIFICANT CHANGE UP (ref 3.5–5.3)
POTASSIUM SERPL-SCNC: 4 MMOL/L — SIGNIFICANT CHANGE UP (ref 3.5–5.3)
PROT UR-MCNC: 15 MG/DL
RBC # BLD: 3.3 M/UL — LOW (ref 4.2–5.8)
RBC # FLD: 13.8 % — SIGNIFICANT CHANGE UP (ref 10.3–14.5)
SODIUM SERPL-SCNC: 136 MMOL/L — SIGNIFICANT CHANGE UP (ref 135–145)
SP GR SPEC: 1.01 — SIGNIFICANT CHANGE UP (ref 1.01–1.02)
SPECIMEN SOURCE: SIGNIFICANT CHANGE UP
UROBILINOGEN FLD QL: 4 MG/DL
WBC # BLD: 8.44 K/UL — SIGNIFICANT CHANGE UP (ref 3.8–10.5)
WBC # FLD AUTO: 8.44 K/UL — SIGNIFICANT CHANGE UP (ref 3.8–10.5)

## 2020-06-15 PROCEDURE — 99232 SBSQ HOSP IP/OBS MODERATE 35: CPT

## 2020-06-15 PROCEDURE — 71045 X-RAY EXAM CHEST 1 VIEW: CPT | Mod: 26

## 2020-06-15 RX ORDER — CEFTRIAXONE 500 MG/1
INJECTION, POWDER, FOR SOLUTION INTRAMUSCULAR; INTRAVENOUS
Refills: 0 | Status: COMPLETED | OUTPATIENT
Start: 2020-06-15 | End: 2020-06-18

## 2020-06-15 RX ORDER — CEFTRIAXONE 500 MG/1
1000 INJECTION, POWDER, FOR SOLUTION INTRAMUSCULAR; INTRAVENOUS EVERY 24 HOURS
Refills: 0 | Status: COMPLETED | OUTPATIENT
Start: 2020-06-16 | End: 2020-06-18

## 2020-06-15 RX ORDER — METOPROLOL TARTRATE 50 MG
25 TABLET ORAL
Refills: 0 | Status: DISCONTINUED | OUTPATIENT
Start: 2020-06-15 | End: 2020-07-08

## 2020-06-15 RX ORDER — CEFTRIAXONE 500 MG/1
1000 INJECTION, POWDER, FOR SOLUTION INTRAMUSCULAR; INTRAVENOUS ONCE
Refills: 0 | Status: COMPLETED | OUTPATIENT
Start: 2020-06-15 | End: 2020-06-15

## 2020-06-15 RX ORDER — CEFTRIAXONE 500 MG/1
INJECTION, POWDER, FOR SOLUTION INTRAMUSCULAR; INTRAVENOUS
Refills: 0 | Status: DISCONTINUED | OUTPATIENT
Start: 2020-06-15 | End: 2020-06-15

## 2020-06-15 RX ADMIN — ENOXAPARIN SODIUM 40 MILLIGRAM(S): 100 INJECTION SUBCUTANEOUS at 17:49

## 2020-06-15 RX ADMIN — QUETIAPINE FUMARATE 200 MILLIGRAM(S): 200 TABLET, FILM COATED ORAL at 21:13

## 2020-06-15 RX ADMIN — Medication 400 MILLIGRAM(S): at 12:29

## 2020-06-15 RX ADMIN — QUETIAPINE FUMARATE 150 MILLIGRAM(S): 200 TABLET, FILM COATED ORAL at 12:30

## 2020-06-15 RX ADMIN — Medication 25 MILLIGRAM(S): at 19:23

## 2020-06-15 RX ADMIN — CLOPIDOGREL BISULFATE 75 MILLIGRAM(S): 75 TABLET, FILM COATED ORAL at 12:29

## 2020-06-15 RX ADMIN — BUDESONIDE AND FORMOTEROL FUMARATE DIHYDRATE 2 PUFF(S): 160; 4.5 AEROSOL RESPIRATORY (INHALATION) at 20:25

## 2020-06-15 RX ADMIN — FAMOTIDINE 20 MILLIGRAM(S): 10 INJECTION INTRAVENOUS at 17:50

## 2020-06-15 RX ADMIN — Medication 2 MILLIGRAM(S): at 21:13

## 2020-06-15 RX ADMIN — Medication 0.5 MILLIGRAM(S): at 12:29

## 2020-06-15 RX ADMIN — FAMOTIDINE 20 MILLIGRAM(S): 10 INJECTION INTRAVENOUS at 05:14

## 2020-06-15 RX ADMIN — SENNA PLUS 2 TABLET(S): 8.6 TABLET ORAL at 21:13

## 2020-06-15 RX ADMIN — Medication 25 MILLIGRAM(S): at 09:05

## 2020-06-15 RX ADMIN — CHLORHEXIDINE GLUCONATE 15 MILLILITER(S): 213 SOLUTION TOPICAL at 05:15

## 2020-06-15 RX ADMIN — ENOXAPARIN SODIUM 40 MILLIGRAM(S): 100 INJECTION SUBCUTANEOUS at 05:12

## 2020-06-15 RX ADMIN — CEFTRIAXONE 1000 MILLIGRAM(S): 500 INJECTION, POWDER, FOR SOLUTION INTRAMUSCULAR; INTRAVENOUS at 17:49

## 2020-06-15 RX ADMIN — POLYETHYLENE GLYCOL 3350 17 GRAM(S): 17 POWDER, FOR SOLUTION ORAL at 12:29

## 2020-06-15 RX ADMIN — ALBUTEROL 2 PUFF(S): 90 AEROSOL, METERED ORAL at 20:25

## 2020-06-15 RX ADMIN — GABAPENTIN 400 MILLIGRAM(S): 400 CAPSULE ORAL at 21:13

## 2020-06-15 RX ADMIN — Medication 100 MILLIGRAM(S): at 12:29

## 2020-06-15 RX ADMIN — Medication 0.5 MILLIGRAM(S): at 19:23

## 2020-06-15 RX ADMIN — CHLORHEXIDINE GLUCONATE 15 MILLILITER(S): 213 SOLUTION TOPICAL at 19:23

## 2020-06-15 RX ADMIN — Medication 81 MILLIGRAM(S): at 12:29

## 2020-06-15 RX ADMIN — Medication 90 MILLIGRAM(S): at 17:49

## 2020-06-15 RX ADMIN — GABAPENTIN 400 MILLIGRAM(S): 400 CAPSULE ORAL at 05:13

## 2020-06-15 RX ADMIN — Medication 90 MILLIGRAM(S): at 05:12

## 2020-06-15 RX ADMIN — Medication 90 MILLIGRAM(S): at 12:29

## 2020-06-15 RX ADMIN — Medication 0.5 MILLIGRAM(S): at 05:14

## 2020-06-15 RX ADMIN — GABAPENTIN 400 MILLIGRAM(S): 400 CAPSULE ORAL at 17:49

## 2020-06-15 RX ADMIN — Medication 5 MILLIGRAM(S): at 21:13

## 2020-06-15 NOTE — PROGRESS NOTE ADULT - SUBJECTIVE AND OBJECTIVE BOX
Subjective:    pat better, on  trach collar, no respiratory distress.    Home Medications:  acetaminophen 325 mg oral tablet: 2 tab(s) orally every 6 hours, As needed, Temp greater or equal to 38.5C (101.3F) (02 Jun 2020 12:53)  albuterol 90 mcg/inh inhalation aerosol: 2 puff(s) inhaled every 4 hours (02 Jun 2020 12:53)  ALPRAZolam 0.5 mg oral tablet: 1 tab(s) orally every 6 hours (02 Jun 2020 12:53)  aspirin 81 mg oral tablet, chewable: 1 tab(s) orally once a day (02 Jun 2020 12:53)  clopidogrel 75 mg oral tablet: 1 tab(s) orally once a day (02 Jun 2020 12:53)  dilTIAZem 90 mg oral tablet: 1 tab(s) orally every 6 hours (02 Jun 2020 12:53)  doxazosin 2 mg oral tablet: 1 tab(s) orally once a day (at bedtime) (02 Jun 2020 12:53)  gabapentin 400 mg oral capsule: 1 cap(s) orally 3 times a day (02 Jun 2020 12:53)  pantoprazole 40 mg oral granule, enteric coated: 40 milligram(s) orally once a day (19 Apr 2020 03:12)  QUEtiapine 100 mg oral tablet: 1 tab(s) orally once a day (at bedtime) (02 Jun 2020 12:53)  QUEtiapine 50 mg oral tablet: 1 tab(s) orally once a day (02 Jun 2020 12:53)  Spiriva 18 mcg inhalation capsule: 1 cap(s) inhaled once a day (19 Apr 2020 03:12)  thiamine 100 mg oral tablet: 1 tab(s) orally once a day (02 Jun 2020 12:53)    MEDICATIONS  (STANDING):  ALBUTerol    90 MICROgram(s) HFA Inhaler 2 Puff(s) Inhalation every 4 hours  ALPRAZolam 0.5 milliGRAM(s) Oral every 6 hours  aspirin  chewable 81 milliGRAM(s) Oral daily  budesonide 160 MICROgram(s)/formoterol 4.5 MICROgram(s) Inhaler 2 Puff(s) Inhalation two times a day  chlorhexidine 0.12% Liquid 15 milliLiter(s) Oral Mucosa every 12 hours  clopidogrel Tablet 75 milliGRAM(s) Oral daily  diltiazem    Tablet 90 milliGRAM(s) Oral every 6 hours  doxazosin 2 milliGRAM(s) Oral at bedtime  enoxaparin Injectable 40 milliGRAM(s) SubCutaneous every 12 hours  famotidine    Tablet 20 milliGRAM(s) Oral two times a day  gabapentin 400 milliGRAM(s) Oral three times a day  melatonin 5 milliGRAM(s) Oral at bedtime  metoprolol tartrate 25 milliGRAM(s) Oral two times a day  polyethylene glycol 3350 17 Gram(s) Oral daily  QUEtiapine 200 milliGRAM(s) Oral at bedtime  QUEtiapine 150 milliGRAM(s) Oral daily  senna 2 Tablet(s) Oral at bedtime  thiamine 100 milliGRAM(s) Oral daily  tiotropium 18 MICROgram(s) Capsule 1 Capsule(s) Inhalation daily    MEDICATIONS  (PRN):  acetaminophen   Tablet .. 650 milliGRAM(s) Oral every 6 hours PRN Temp greater or equal to 38.5C (101.3F)  ibuprofen  Tablet. 400 milliGRAM(s) Oral every 6 hours PRN Moderate Pain (4 - 6)      Allergies    Ceclor (Rash)  Duricef (Rash)    Intolerances        Vital Signs Last 24 Hrs  T(C): 38.7 (15 Jackson 2020 12:30), Max: 38.7 (15 Jackson 2020 12:30)  T(F): 101.7 (15 Jackson 2020 12:30), Max: 101.7 (15 Jackson 2020 12:30)  HR: 120 (15 Jackson 2020 12:00) (77 - 123)  BP: 113/48 (15 Jackson 2020 12:00) (90/60 - 152/64)  BP(mean): 60 (15 Jackson 2020 12:00) (60 - 93)  RR: 34 (15 Jackson 2020 12:00) (24 - 35)  SpO2: 100% (15 Jackson 2020 12:00) (96% - 100%)      PHYSICAL EXAMINATION:    NECK:  Supple. No lymphadenopathy. Jugular venous pressure not elevated. Carotids equal.   HEART:   The cardiac impulse has a normal quality. Reg., Nl S1 and S2.  There are no murmurs, rubs or gallops noted  CHEST:  Chest is clear to auscultation. Normal respiratory effort.  ABDOMEN:  Soft and nontender.   EXTREMITIES:  There is no edema.       LABS:                        10.3   8.44  )-----------( 199      ( 15 Jackson 2020 07:01 )             31.0     06-15    136  |  101  |  17  ----------------------------<  102<H>  4.0   |  29  |  0.47<L>    Ca    9.2      15 Jackson 2020 07:01

## 2020-06-15 NOTE — CHART NOTE - NSCHARTNOTEFT_GEN_A_CORE
Temp spike to 101.7  + mod secretion  Hemodynamically stable  Ordered BCx and lactate level  Check CXR  Sent Sputum Cx  Check UA and UCx  No indication for IVF bolus at this time  Hold on Abx until more data is obtained

## 2020-06-15 NOTE — PROGRESS NOTE ADULT - ASSESSMENT
PROBLEMS;    s/p staph bactermia/sputum pseudomonas/staph  Ac on chronic hypercapnic & hypoxamic respiratory failure-s/p trach & PEG  sputum-Few Pseudomonas aeruginosa (Carbapenem Resistant)/Moderate Methicillin resistant Staphylococcus aureus  afib with RVR  OHS/VIJAY  AC flare of COPD/chronic vs acute on chronic bronchitis  Mild interstitial lung disease-NSIP h/o smoking  COVID negativex2  Pulmonary hypertension  Nonobstructing stone in the left ureterovesicular junction/ nonobstructing stone in the lower pole right kidney.  Subacute hemorrhage in the right rectus abdominis along the anterior sheath, measures 1.6 cm in thickness and extends from the umbilicus to the inferior myotendinous junction  Cirrhosis  Mild splenomegaly-portal venous hypertension.  Chronic afib w Watchman device  CHF  BPH  GERD  ETOH abuse  seizures disorder    PLAN;    pulmonary slow recovery-on trach collar-change to cuffless & fenestrated trach  Tube feeding as tolerated  supportive care  covid repeat testing-neg  Eliquis/asa 81  d/w staff

## 2020-06-15 NOTE — PROGRESS NOTE ADULT - ASSESSMENT
IMP:    64 y/o male w chronic AFib w Watchman device, HFpEF , COPD on home O2, HTN, ETOH use and pt of size admitted on 4/18 with COPD exacerbation--RRT called on 4/23 for AMS requiring intubation--extubated on 4/26 then re intubated on 4/29.  S/P Trach and PEG on 5/15 and tolerating TC.  Most recent active issue is persistent MSSA sepsis and CRP in sputum--Completed IV Ancef (6/5) and completed Cipro.  No central access   Chronic resp failure  TM Encephalopathy and agitation--almost resolved    Plan:    TC as tolerated  D/W CTS--would like to change to fenestrated trach and Cap  Xanax to 0.5 q 6  Seroquel and Oxy (QTc < 450)  Cont clopidogrel and ASA for CAD and AFib  Cont Dilt 90 q6  Add BBx 25 q 12   TF to goal  HOB > 30  Started cardura   DVT prophy--SC and LMWH  OOB or sitting position today   No need for daily labs    ICU care--d/w ICU staff on multi disciplinary rounds.  Stable for placement today

## 2020-06-15 NOTE — CHART NOTE - NSCHARTNOTEFT_GEN_A_CORE
Assessment:   *pt admitted on 4/18 with COPD exacerbation--RRT called on 4/23 for AMS requiring intubation--extubated on 4/26 then re intubated on 4/29.  S/P Trach and PEG on 5/15 and tolerating TC.  Most recent active issue is persistent MSSA sepsis and CRP in sputum--Completed IV Ancef (6/5) and completed Cipro.  Chronic resp failure. TM Encephalopathy and agitation--almost resolved.    *(+3) generalized edema.  BM (+) 6/15.    *per flowsheet, pt received an average of ~1425mL/day of Jevity 1.5 over the past five days.  Combined with prosource TF provided ~2378Kcal and 157g protein.  Which met ~100% of estimated calorie/protein needs.  No s/s of intolerance reported.    *no new wt documented since 5/9; obtain new wt when feasible    Recommendations:  1) c/w Jevity 1.5 @ 80mL/hr with 6pkts Prosource TF  2) monitor hydration status; adjust free water flushes prn  3) monitor TF tolerance; keep back of bed > 35 degrees  4) obtain new wt when feasible      Diet Prescription: Diet, NPO with Tube Feed:   Tube Feeding Modality: Gastrostomy  Jevity 1.5 Randall (JEVITY1.5)  Total Volume for 24 Hours (mL): 1920  Continuous  Until Goal Tube Feed Rate (mL per Hour): 80  Tube Feed Duration (in Hours): 24  Tube Feed Start Time: 00:00  No Carb Prosource TF     Qty per Day:  6 (06-01-20 @ 13:37)      Wt Hx:  117.1Kg (5/9)  117.9Kg (4/18)      06-14-20 @ 07:01  -  06-15-20 @ 07:00  --------------------------------------------------------  IN: 2707 mL / OUT: 0 mL / NET: 2707 mL        Estimated Needs:   2250-2700Kcal (25-30Kcal/Kg of adjust BW; 90Kg)  126-162g protein (1.4-1.8g/Kg adjusted BW: 90kg)  2250-2700mL (25-30mL/Kg of adjusted BW: 90Kg)        Pertinent Labs: 06-15 Na136 mmol/L Glu 102 mg/dL<H> K+ 4.0 mmol/L Cr  0.47 mg/dL<L> BUN 17 mg/dL 06-09 Phos 4.5 mg/dL        Skin: karla score = 15  SDTI Rt buttock  stage 2: Lt buttock    Monitoring and Evaluation:   [x] PO intake/Nutr support infusion [ x ] Tolerance to Nutr [ x ] weights [ x ] labs[ x ] follow up per protocol  [ ] other:

## 2020-06-15 NOTE — CHART NOTE - NSCHARTNOTEFT_GEN_A_CORE
Spoke with Litzy via phone--[phone].  All concerns addressed including but not limited to diagnosis, treatment plan and overall prognosis.  She will be in on Wednesday to complete MOLST form

## 2020-06-15 NOTE — PROGRESS NOTE ADULT - SUBJECTIVE AND OBJECTIVE BOX
Subjective:  patient tolerating trach collar.  Intensivist requesting trach change.    Vital Signs:  Vital Signs Last 24 Hrs  T(C): 36.9 (06-15-20 @ 04:00), Max: 37 (06-15-20 @ 00:00)  T(F): 98.4 (06-15-20 @ 04:00), Max: 98.6 (06-15-20 @ 00:00)  HR: 119 (06-15-20 @ 11:00) (77 - 123)  BP: 120/83 (06-15-20 @ 11:00) (90/60 - 152/64)  RR: 34 (06-15-20 @ 11:00) (18 - 35)  SpO2: 98% (06-15-20 @ 11:00) (96% - 100%) on 30% TC      Relevant labs, radiology and Medications reviewed                        10.3   8.44  )-----------( 199      ( 15 Jackson 2020 07:01 )             31.0     06-15    136  |  101  |  17  ----------------------------<  102<H>  4.0   |  29  |  0.47<L>    Ca    9.2      15 Jackson 2020 07:01        MEDICATIONS  (STANDING):  ALBUTerol    90 MICROgram(s) HFA Inhaler 2 Puff(s) Inhalation every 4 hours  ALPRAZolam 0.5 milliGRAM(s) Oral every 6 hours  aspirin  chewable 81 milliGRAM(s) Oral daily  budesonide 160 MICROgram(s)/formoterol 4.5 MICROgram(s) Inhaler 2 Puff(s) Inhalation two times a day  chlorhexidine 0.12% Liquid 15 milliLiter(s) Oral Mucosa every 12 hours  clopidogrel Tablet 75 milliGRAM(s) Oral daily  diltiazem    Tablet 90 milliGRAM(s) Oral every 6 hours  doxazosin 2 milliGRAM(s) Oral at bedtime  enoxaparin Injectable 40 milliGRAM(s) SubCutaneous every 12 hours  famotidine    Tablet 20 milliGRAM(s) Oral two times a day  gabapentin 400 milliGRAM(s) Oral three times a day  melatonin 5 milliGRAM(s) Oral at bedtime  metoprolol tartrate 25 milliGRAM(s) Oral two times a day  polyethylene glycol 3350 17 Gram(s) Oral daily  QUEtiapine 200 milliGRAM(s) Oral at bedtime  QUEtiapine 150 milliGRAM(s) Oral daily  senna 2 Tablet(s) Oral at bedtime  thiamine 100 milliGRAM(s) Oral daily  tiotropium 18 MICROgram(s) Capsule 1 Capsule(s) Inhalation daily    MEDICATIONS  (PRN):  acetaminophen   Tablet .. 650 milliGRAM(s) Oral every 6 hours PRN Temp greater or equal to 38.5C (101.3F)  ibuprofen  Tablet. 400 milliGRAM(s) Oral every 6 hours PRN Moderate Pain (4 - 6)      Physical exam  Gen: NAD  Neuro: AO calm  Card: S1 S2  Pulm: equal bilaterally  Abd: Soft PEG in place  Ext: no edema      I&O's Summary    14 Jun 2020 07:01  -  15 Jackson 2020 07:00  --------------------------------------------------------  IN: 2707 mL / OUT: 0 mL / NET: 2707 mL        Assessment  65y Male  w/ PAST MEDICAL & SURGICAL HISTORY:  Chronic CHF  COPD without exacerbation  Atrial fibrillation  Presence of Watchman left atrial appendage closure device  Hypertension  GERD (gastroesophageal reflux disease)  Chronic obstructive pulmonary disease (COPD)  Anemia  Falls  Meningitis  Collapsed lung  Alcohol withdrawal  Cirrhosis  CHF (congestive heart failure)  Poor historian  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    1.  Chronic respiratory failure  2.  COPD exacerbation  3.  Pneumonia    Will plan for trach change tomorrow.  Continue TC as tolerated.  Dispo planning as per primary team.    Discussed with Cardiothoracic Team at AM rounds.

## 2020-06-15 NOTE — PROGRESS NOTE ADULT - SUBJECTIVE AND OBJECTIVE BOX
Hospital # 57  ICU # 52  Vent # 22 and Trach and PEG # 30    CC:  Respiratory Failure     HPI:    64 y/o male w chronic AFib w Watchman device, HFpEF , COPD on home O2, HTN, ETOH use and pt of size admitted on 4/18 with COPD exacerbation--RRT called on 4/23 for AMS requiring intubation--extubated on 4/26 then re intubated on 4/29.  S/P Trach and PEG on 5/15 and has failed SBT.  Most recent active issues is persistent MSSA sepsis and CRP in sputum--on IV vanco and Cipro.  No central access      5/22:  Pt seen and examined in ICU--vented-- Encephalopathy.  still MSSA + in Bcx  5/23:  Tolerated PSV 12 x 3 hrs on 5/22.  SBT ongoing now.  Poor mentation.  Repeat BCx sent.  Abx changed to Ancef  5/24:  PSV X 4.5 hrs on 5/23.  Rapid AFib O/N.  Still Encephalopathy.  COVID sent.  5/23 BCx still P.     6/1:  Case d/w Dr horne.  Pt seen and examined in ICU--T+P--attempts to follow commands but overall Encephalopathic.  SBT ongoing.  Tm 99.6  6/2:  No restraints overnight.  Calm.  + BM yesterday.  On vent support.  Stable vitals.  No fever.  Stable for placement   6/3:  No restraints.  Slightly more awake and alert.  On Vent support.  Stable for placement.  Stable vitals and no fevers  6/4:  No restraints. No haldol.  Xanzx.  Awake and following commands and attempting to speak  6/5:  No events overnight.  Able to engage and follow simple commands.  No restraints.  Last day of Ancef  6/6:  No events overnight.  Tolerated SBT 5.5 hrs on 6/5.  Awake.  Follows commands.  Restless but but responsive  6/7:  No events overnight.  Awake and alert.  Lucid.  SBT ongoing     6/15:  Case d/w dr horne--seen and examined in ICU--on TC x several days.  Awake but still restless and pulling on tubes.  In RAFib       PMH:  As above.     PSH:  As above.     FH: Non Contributory other than those listed in HPI    Social History:  Unobtainable due to clinical condition     MEDICATIONS  (STANDING):  ALBUTerol    90 MICROgram(s) HFA Inhaler 2 Puff(s) Inhalation every 4 hours  ALPRAZolam 0.5 milliGRAM(s) Oral every 6 hours  aspirin  chewable 81 milliGRAM(s) Oral daily  budesonide 160 MICROgram(s)/formoterol 4.5 MICROgram(s) Inhaler 2 Puff(s) Inhalation two times a day  chlorhexidine 0.12% Liquid 15 milliLiter(s) Oral Mucosa every 12 hours  clopidogrel Tablet 75 milliGRAM(s) Oral daily  diltiazem    Tablet 90 milliGRAM(s) Oral every 6 hours  doxazosin 2 milliGRAM(s) Oral at bedtime  enoxaparin Injectable 40 milliGRAM(s) SubCutaneous every 12 hours  famotidine    Tablet 20 milliGRAM(s) Oral two times a day  gabapentin 400 milliGRAM(s) Oral three times a day  melatonin 5 milliGRAM(s) Oral at bedtime  metoprolol tartrate 25 milliGRAM(s) Oral two times a day  polyethylene glycol 3350 17 Gram(s) Oral daily  QUEtiapine 200 milliGRAM(s) Oral at bedtime  QUEtiapine 150 milliGRAM(s) Oral daily  senna 2 Tablet(s) Oral at bedtime  thiamine 100 milliGRAM(s) Oral daily  tiotropium 18 MICROgram(s) Capsule 1 Capsule(s) Inhalation daily    MEDICATIONS  (PRN):  acetaminophen   Tablet .. 650 milliGRAM(s) Oral every 6 hours PRN Temp greater or equal to 38.5C (101.3F)  ibuprofen  Tablet. 400 milliGRAM(s) Oral every 6 hours PRN Moderate Pain (4 - 6)      Allergies: NKDA    ROS:  SEE BELOW        ICU Vital Signs Last 24 Hrs  T(C): 36.9 (15 Jackson 2020 04:00), Max: 37.1 (14 Jun 2020 10:00)  T(F): 98.4 (15 Jackson 2020 04:00), Max: 98.8 (14 Jun 2020 10:00)  HR: 118 (15 Jackson 2020 09:00) (77 - 123)  BP: 134/68 (15 Jackson 2020 09:00) (90/60 - 150/37)  BP(mean): 81 (15 Jackson 2020 09:00) (64 - 93)  ABP: --  ABP(mean): --  RR: 35 (15 Jackson 2020 09:00) (18 - 35)  SpO2: 99% (15 Jackson 2020 09:00) (96% - 100%)          I&O's Summary    14 Jun 2020 07:01  -  15 Jackson 2020 07:00  --------------------------------------------------------  IN: 2707 mL / OUT: 0 mL / NET: 2707 mL        Physical Exam:  SEE BELOW                          10.3   8.44  )-----------( 199      ( 15 Jackson 2020 07:01 )             31.0       06-15    136  |  101  |  17  ----------------------------<  102<H>  4.0   |  29  |  0.47<L>    Ca    9.2      15 Jackson 2020 07:01                      DVT Prophylaxis:                                                            Contraindication:     Advanced Directives:    Discussed with:    Visit Information:  Time spent excluding procedure:      ** Time is exclusive of billed procedures and/or teaching and/or routine family updates.

## 2020-06-16 PROCEDURE — 99233 SBSQ HOSP IP/OBS HIGH 50: CPT

## 2020-06-16 PROCEDURE — 99497 ADVNCD CARE PLAN 30 MIN: CPT

## 2020-06-16 PROCEDURE — 99232 SBSQ HOSP IP/OBS MODERATE 35: CPT

## 2020-06-16 RX ADMIN — Medication 0.5 MILLIGRAM(S): at 18:06

## 2020-06-16 RX ADMIN — FAMOTIDINE 20 MILLIGRAM(S): 10 INJECTION INTRAVENOUS at 21:02

## 2020-06-16 RX ADMIN — CLOPIDOGREL BISULFATE 75 MILLIGRAM(S): 75 TABLET, FILM COATED ORAL at 10:24

## 2020-06-16 RX ADMIN — Medication 90 MILLIGRAM(S): at 01:01

## 2020-06-16 RX ADMIN — CHLORHEXIDINE GLUCONATE 15 MILLILITER(S): 213 SOLUTION TOPICAL at 10:23

## 2020-06-16 RX ADMIN — ALBUTEROL 2 PUFF(S): 90 AEROSOL, METERED ORAL at 06:01

## 2020-06-16 RX ADMIN — Medication 81 MILLIGRAM(S): at 10:24

## 2020-06-16 RX ADMIN — ALBUTEROL 2 PUFF(S): 90 AEROSOL, METERED ORAL at 14:19

## 2020-06-16 RX ADMIN — Medication 0.5 MILLIGRAM(S): at 23:32

## 2020-06-16 RX ADMIN — QUETIAPINE FUMARATE 150 MILLIGRAM(S): 200 TABLET, FILM COATED ORAL at 10:24

## 2020-06-16 RX ADMIN — GABAPENTIN 400 MILLIGRAM(S): 400 CAPSULE ORAL at 21:02

## 2020-06-16 RX ADMIN — Medication 90 MILLIGRAM(S): at 18:06

## 2020-06-16 RX ADMIN — ENOXAPARIN SODIUM 40 MILLIGRAM(S): 100 INJECTION SUBCUTANEOUS at 10:23

## 2020-06-16 RX ADMIN — Medication 25 MILLIGRAM(S): at 21:02

## 2020-06-16 RX ADMIN — ALBUTEROL 2 PUFF(S): 90 AEROSOL, METERED ORAL at 00:05

## 2020-06-16 RX ADMIN — CEFTRIAXONE 1000 MILLIGRAM(S): 500 INJECTION, POWDER, FOR SOLUTION INTRAMUSCULAR; INTRAVENOUS at 16:37

## 2020-06-16 RX ADMIN — Medication 25 MILLIGRAM(S): at 10:24

## 2020-06-16 RX ADMIN — Medication 90 MILLIGRAM(S): at 23:32

## 2020-06-16 RX ADMIN — GABAPENTIN 400 MILLIGRAM(S): 400 CAPSULE ORAL at 14:18

## 2020-06-16 RX ADMIN — Medication 100 MILLIGRAM(S): at 10:24

## 2020-06-16 RX ADMIN — SENNA PLUS 2 TABLET(S): 8.6 TABLET ORAL at 21:02

## 2020-06-16 RX ADMIN — Medication 90 MILLIGRAM(S): at 13:04

## 2020-06-16 RX ADMIN — QUETIAPINE FUMARATE 200 MILLIGRAM(S): 200 TABLET, FILM COATED ORAL at 21:02

## 2020-06-16 RX ADMIN — Medication 90 MILLIGRAM(S): at 06:21

## 2020-06-16 RX ADMIN — BUDESONIDE AND FORMOTEROL FUMARATE DIHYDRATE 2 PUFF(S): 160; 4.5 AEROSOL RESPIRATORY (INHALATION) at 14:19

## 2020-06-16 RX ADMIN — Medication 0.5 MILLIGRAM(S): at 01:01

## 2020-06-16 RX ADMIN — Medication 5 MILLIGRAM(S): at 21:02

## 2020-06-16 RX ADMIN — Medication 0.5 MILLIGRAM(S): at 06:21

## 2020-06-16 RX ADMIN — POLYETHYLENE GLYCOL 3350 17 GRAM(S): 17 POWDER, FOR SOLUTION ORAL at 10:23

## 2020-06-16 RX ADMIN — GABAPENTIN 400 MILLIGRAM(S): 400 CAPSULE ORAL at 06:21

## 2020-06-16 RX ADMIN — FAMOTIDINE 20 MILLIGRAM(S): 10 INJECTION INTRAVENOUS at 10:23

## 2020-06-16 RX ADMIN — ENOXAPARIN SODIUM 40 MILLIGRAM(S): 100 INJECTION SUBCUTANEOUS at 21:02

## 2020-06-16 RX ADMIN — CHLORHEXIDINE GLUCONATE 15 MILLILITER(S): 213 SOLUTION TOPICAL at 21:02

## 2020-06-16 RX ADMIN — Medication 2 MILLIGRAM(S): at 21:02

## 2020-06-16 NOTE — PROGRESS NOTE ADULT - SUBJECTIVE AND OBJECTIVE BOX
Subjective:    pat on trach collar, no respiratory distress. pat fever 101, started abx rhocephin, posible UTI, sputum Cx GNR.    Home Medications:  acetaminophen 325 mg oral tablet: 2 tab(s) orally every 6 hours, As needed, Temp greater or equal to 38.5C (101.3F) (2020 12:53)  albuterol 90 mcg/inh inhalation aerosol: 2 puff(s) inhaled every 4 hours (2020 12:53)  ALPRAZolam 0.5 mg oral tablet: 1 tab(s) orally every 6 hours (2020 12:53)  aspirin 81 mg oral tablet, chewable: 1 tab(s) orally once a day (2020 12:53)  clopidogrel 75 mg oral tablet: 1 tab(s) orally once a day (2020 12:53)  dilTIAZem 90 mg oral tablet: 1 tab(s) orally every 6 hours (2020 12:53)  doxazosin 2 mg oral tablet: 1 tab(s) orally once a day (at bedtime) (2020 12:53)  gabapentin 400 mg oral capsule: 1 cap(s) orally 3 times a day (2020 12:53)  pantoprazole 40 mg oral granule, enteric coated: 40 milligram(s) orally once a day (2020 03:12)  QUEtiapine 100 mg oral tablet: 1 tab(s) orally once a day (at bedtime) (2020 12:53)  QUEtiapine 50 mg oral tablet: 1 tab(s) orally once a day (2020 12:53)  Spiriva 18 mcg inhalation capsule: 1 cap(s) inhaled once a day (2020 03:12)  thiamine 100 mg oral tablet: 1 tab(s) orally once a day (2020 12:53)    MEDICATIONS  (STANDING):  ALBUTerol    90 MICROgram(s) HFA Inhaler 2 Puff(s) Inhalation every 4 hours  ALPRAZolam 0.5 milliGRAM(s) Oral every 6 hours  aspirin  chewable 81 milliGRAM(s) Oral daily  budesonide 160 MICROgram(s)/formoterol 4.5 MICROgram(s) Inhaler 2 Puff(s) Inhalation two times a day  cefTRIAXone Injectable.      cefTRIAXone Injectable. 1000 milliGRAM(s) IV Push every 24 hours  chlorhexidine 0.12% Liquid 15 milliLiter(s) Oral Mucosa every 12 hours  clopidogrel Tablet 75 milliGRAM(s) Oral daily  diltiazem    Tablet 90 milliGRAM(s) Oral every 6 hours  doxazosin 2 milliGRAM(s) Oral at bedtime  enoxaparin Injectable 40 milliGRAM(s) SubCutaneous every 12 hours  famotidine    Tablet 20 milliGRAM(s) Oral two times a day  gabapentin 400 milliGRAM(s) Oral three times a day  melatonin 5 milliGRAM(s) Oral at bedtime  metoprolol tartrate 25 milliGRAM(s) Oral two times a day  polyethylene glycol 3350 17 Gram(s) Oral daily  QUEtiapine 200 milliGRAM(s) Oral at bedtime  QUEtiapine 150 milliGRAM(s) Oral daily  senna 2 Tablet(s) Oral at bedtime  thiamine 100 milliGRAM(s) Oral daily  tiotropium 18 MICROgram(s) Capsule 1 Capsule(s) Inhalation daily    MEDICATIONS  (PRN):  acetaminophen   Tablet .. 650 milliGRAM(s) Oral every 6 hours PRN Temp greater or equal to 38.5C (101.3F)  ibuprofen  Tablet. 400 milliGRAM(s) Oral every 6 hours PRN Moderate Pain (4 - 6)      Allergies    Ceclor (Rash)  Duricef (Rash)    Intolerances        Vital Signs Last 24 Hrs  T(C): 36.9 (2020 12:00), Max: 37.7 (15 Jackson 2020 14:00)  T(F): 98.4 (2020 12:00), Max: 99.9 (15 Jackson 2020 14:00)  HR: 75 (2020 12:00) (57 - 105)  BP: 92/60 (2020 12:00) (92/60 - 128/74)  BP(mean): 67 (2020 12:00) (67 - 93)  RR: 30 (2020 12:00) (17 - 34)  SpO2: 100% (2020 12:00) (94% - 100%)      PHYSICAL EXAMINATION:    NECK:  Supple. No lymphadenopathy. Jugular venous pressure not elevated. Carotids equal.   HEART:   The cardiac impulse has a normal quality. Reg., Nl S1 and S2.  There are no murmurs, rubs or gallops noted  CHEST:  Chest crackles to auscultation. Normal respiratory effort.  ABDOMEN:  Soft and nontender.   EXTREMITIES:  There is no edema.       LABS:                        10.3   8.44  )-----------( 199      ( 15 Jackson 2020 07:01 )             31.0     06-15    136  |  101  |  17  ----------------------------<  102<H>  4.0   |  29  |  0.47<L>    Ca    9.2      15 Jackson 2020 07:01        Urinalysis Basic - ( 15 Jackson 2020 14:15 )    Color: Yellow / Appearance: Clear / S.015 / pH: x  Gluc: x / Ketone: Trace  / Bili: Negative / Urobili: 4 mg/dL   Blood: x / Protein: 15 mg/dL / Nitrite: Negative   Leuk Esterase: Moderate / RBC: 3-5 /HPF / WBC >50   Sq Epi: x / Non Sq Epi: Occasional / Bacteria: TNTC        Culture - Sputum (collected 06-15-20 @ 14:15)  Source: .Sputum Sputum  Gram Stain (06-15-20 @ 21:24):    Numerous polymorphonuclear leukocytes per low power field    Moderate Squamous epithelial cells per low power field    Numerous Gram Negative Rods per oil power field      Xray Chest 1 View- PORTABLE-Urgent (06.15.20 @ 13:25) >  IMPRESSION: Clear lungs at this time. Heart enlargement again noted.

## 2020-06-16 NOTE — PROGRESS NOTE ADULT - SUBJECTIVE AND OBJECTIVE BOX
Subjective:  Febrile yesterday.  Still tolerating TC.    Vital Signs:  Vital Signs Last 24 Hrs  T(C): 36.9 (06-16-20 @ 12:00), Max: 37.7 (06-15-20 @ 14:00)  T(F): 98.4 (06-16-20 @ 12:00), Max: 99.9 (06-15-20 @ 14:00)  HR: 74 (06-16-20 @ 13:00) (57 - 105)  BP: 87/46 (06-16-20 @ 13:00) (87/46 - 128/74)  RR: 30 (06-16-20 @ 13:00) (17 - 34)  SpO2: 100% (06-16-20 @ 13:00) (94% - 100%) on 30% fiO2    Telemetry/Alarms: atrial fibrillation    Relevant labs, radiology and Medications reviewed                        10.3   8.44  )-----------( 199      ( 15 Jackson 2020 07:01 )             31.0     06-15    136  |  101  |  17  ----------------------------<  102<H>  4.0   |  29  |  0.47<L>    Ca    9.2      15 Jackson 2020 07:01        MEDICATIONS  (STANDING):  ALBUTerol    90 MICROgram(s) HFA Inhaler 2 Puff(s) Inhalation every 4 hours  ALPRAZolam 0.5 milliGRAM(s) Oral every 6 hours  aspirin  chewable 81 milliGRAM(s) Oral daily  budesonide 160 MICROgram(s)/formoterol 4.5 MICROgram(s) Inhaler 2 Puff(s) Inhalation two times a day  cefTRIAXone Injectable.      cefTRIAXone Injectable. 1000 milliGRAM(s) IV Push every 24 hours  chlorhexidine 0.12% Liquid 15 milliLiter(s) Oral Mucosa every 12 hours  clopidogrel Tablet 75 milliGRAM(s) Oral daily  diltiazem    Tablet 90 milliGRAM(s) Oral every 6 hours  doxazosin 2 milliGRAM(s) Oral at bedtime  enoxaparin Injectable 40 milliGRAM(s) SubCutaneous every 12 hours  famotidine    Tablet 20 milliGRAM(s) Oral two times a day  gabapentin 400 milliGRAM(s) Oral three times a day  melatonin 5 milliGRAM(s) Oral at bedtime  metoprolol tartrate 25 milliGRAM(s) Oral two times a day  polyethylene glycol 3350 17 Gram(s) Oral daily  QUEtiapine 200 milliGRAM(s) Oral at bedtime  QUEtiapine 150 milliGRAM(s) Oral daily  senna 2 Tablet(s) Oral at bedtime  thiamine 100 milliGRAM(s) Oral daily  tiotropium 18 MICROgram(s) Capsule 1 Capsule(s) Inhalation daily    MEDICATIONS  (PRN):  acetaminophen   Tablet .. 650 milliGRAM(s) Oral every 6 hours PRN Temp greater or equal to 38.5C (101.3F)  ibuprofen  Tablet. 400 milliGRAM(s) Oral every 6 hours PRN Moderate Pain (4 - 6)      Physical exam  Gen: NAD  Neuro: AO x 3  Card: S1 S2  Pulm: equal bilaterally  Abd: soft PEG in place  Ext:  no edema    Trach changed at bedside with Dr. Morris.  #6 cuffless fenestrated trach placed through existing track.  Patient tolerated it well.  Can phonate.    I&O's Summary    15 Jackson 2020 07:01  -  16 Jun 2020 07:00  --------------------------------------------------------  IN: 2132 mL / OUT: 0 mL / NET: 2132 mL        Assessment  65y Male  w/ PAST MEDICAL & SURGICAL HISTORY:  Chronic CHF  COPD without exacerbation  Atrial fibrillation  Presence of Watchman left atrial appendage closure device  Hypertension  GERD (gastroesophageal reflux disease)  Chronic obstructive pulmonary disease (COPD)  Anemia  Falls  Meningitis  Collapsed lung  Alcohol withdrawal  Emphysema of lung  Cirrhosis  CHF (congestive heart failure)  Poor historian  Alcohol abuse  Chronic atrial fibrillation  Unilateral amputation of lower extremity below knee  Presence of Watchman left atrial appendage closure device  S/P BKA (below knee amputation) unilateral, left    Patient is a 65y old  Male who presents with a chief complaint of acute hypoxemic, hypercapneic resp failure  COPD exacerbation (16 Jun 2020 12:32)  .    1.  Chronic respiratory failure  2.  COPD exacerbation  3.  Pneumonia    Trach can be capped.  Change to nasal canula.  Continue antibiotics as per primary team.      Discussed with Cardiothoracic Team at AM rounds.

## 2020-06-16 NOTE — PROGRESS NOTE ADULT - SUBJECTIVE AND OBJECTIVE BOX
Hospital # 58  ICU # 53  Vent # 22 and Trach and PEG # 31    CC:  Respiratory Failure     HPI:    66 y/o male w chronic AFib w Watchman device, HFpEF , COPD on home O2, HTN, ETOH use and pt of size admitted on  with COPD exacerbation--RRT called on  for AMS requiring intubation--extubated on  then re intubated on .  S/P Trach and PEG on 5/15 and has failed SBT.  Most recent active issues is persistent MSSA sepsis and CRP in sputum--on IV vanco and Cipro.  No central access      :  Pt seen and examined in ICU--vented-- Encephalopathy.  still MSSA + in Bcx  :  Tolerated PSV 12 x 3 hrs on .  SBT ongoing now.  Poor mentation.  Repeat BCx sent.  Abx changed to Ancef  :  PSV X 4.5 hrs on .  Rapid AFib O/N.  Still Encephalopathy.  COVID sent.   BCx still P.     :  Case d/w Dr horne.  Pt seen and examined in ICU--T+P--attempts to follow commands but overall Encephalopathic.  SBT ongoing.  Tm 99.6  :  No restraints overnight.  Calm.  + BM yesterday.  On vent support.  Stable vitals.  No fever.  Stable for placement   6/3:  No restraints.  Slightly more awake and alert.  On Vent support.  Stable for placement.  Stable vitals and no fevers  :  No restraints. No haldol.  Xanzx.  Awake and following commands and attempting to speak  :  No events overnight.  Able to engage and follow simple commands.  No restraints.  Last day of Ancef  :  No events overnight.  Tolerated SBT 5.5 hrs on .  Awake.  Follows commands.  Restless but but responsive  :  No events overnight.  Awake and alert.  Lucid.  SBT ongoing     6/15:  Case d/w dr horne--seen and examined in ICU--on TC x several days.  Awake but still restless and pulling on tubes.  In RAFib   :  HR improved with BBx.  New temp spike to 101.7 on 6/15--possible UTI and started on CTX--No fevers.  CXR (6/15)-- Personally reviewed--no infiltrates     PMH:  As above.     PSH:  As above.     FH: Non Contributory other than those listed in HPI    Social History:  Unobtainable due to clinical condition     MEDICATIONS  (STANDING):  ALBUTerol    90 MICROgram(s) HFA Inhaler 2 Puff(s) Inhalation every 4 hours  ALPRAZolam 0.5 milliGRAM(s) Oral every 6 hours  aspirin  chewable 81 milliGRAM(s) Oral daily  budesonide 160 MICROgram(s)/formoterol 4.5 MICROgram(s) Inhaler 2 Puff(s) Inhalation two times a day  cefTRIAXone Injectable.      cefTRIAXone Injectable. 1000 milliGRAM(s) IV Push every 24 hours  chlorhexidine 0.12% Liquid 15 milliLiter(s) Oral Mucosa every 12 hours  clopidogrel Tablet 75 milliGRAM(s) Oral daily  diltiazem    Tablet 90 milliGRAM(s) Oral every 6 hours  doxazosin 2 milliGRAM(s) Oral at bedtime  enoxaparin Injectable 40 milliGRAM(s) SubCutaneous every 12 hours  famotidine    Tablet 20 milliGRAM(s) Oral two times a day  gabapentin 400 milliGRAM(s) Oral three times a day  melatonin 5 milliGRAM(s) Oral at bedtime  metoprolol tartrate 25 milliGRAM(s) Oral two times a day  polyethylene glycol 3350 17 Gram(s) Oral daily  QUEtiapine 200 milliGRAM(s) Oral at bedtime  QUEtiapine 150 milliGRAM(s) Oral daily  senna 2 Tablet(s) Oral at bedtime  thiamine 100 milliGRAM(s) Oral daily  tiotropium 18 MICROgram(s) Capsule 1 Capsule(s) Inhalation daily    MEDICATIONS  (PRN):  acetaminophen   Tablet .. 650 milliGRAM(s) Oral every 6 hours PRN Temp greater or equal to 38.5C (101.3F)  ibuprofen  Tablet. 400 milliGRAM(s) Oral every 6 hours PRN Moderate Pain (4 - 6)      Allergies: NKDA    ROS:  SEE BELOW        ICU Vital Signs Last 24 Hrs  T(C): 37 (2020 08:00), Max: 38.7 (15 Jackson 2020 12:30)  T(F): 98.6 (2020 08:00), Max: 101.7 (15 Jackson 2020 12:30)  HR: 86 (2020 09:00) (57 - 120)  BP: 110/66 (2020 09:00) (105/40 - 152/64)  BP(mean): 78 (2020 09:00) (60 - 93)  ABP: --  ABP(mean): --  RR: 32 (2020 09:00) (17 - 34)  SpO2: 100% (2020 09:00) (94% - 100%)          I&O's Summary    15 Jackson 2020 07:01  -  2020 07:00  --------------------------------------------------------  IN: 2132 mL / OUT: 0 mL / NET: 2132 mL        Physical Exam:  SEE BELOW                          10.3   8.44  )-----------( 199      ( 15 Jackson 2020 07:01 )             31.0       06-15    136  |  101  |  17  ----------------------------<  102<H>  4.0   |  29  |  0.47<L>    Ca    9.2      15 Jackson 2020 07:01                  Urinalysis Basic - ( 15 Jackson 2020 14:15 )    Color: Yellow / Appearance: Clear / S.015 / pH: x  Gluc: x / Ketone: Trace  / Bili: Negative / Urobili: 4 mg/dL   Blood: x / Protein: 15 mg/dL / Nitrite: Negative   Leuk Esterase: Moderate / RBC: 3-5 /HPF / WBC >50   Sq Epi: x / Non Sq Epi: Occasional / Bacteria: TNTC        DVT Prophylaxis:                                                            Contraindication:     Advanced Directives:    Discussed with:    Visit Information:  Time spent excluding procedure:      ** Time is exclusive of billed procedures and/or teaching and/or routine family updates.

## 2020-06-16 NOTE — PROGRESS NOTE ADULT - NSHPATTENDINGPLANDISCUSS_GEN_ALL_CORE
Dr. Crews
Dr. William
ICU team
Patient
pt. and staff
pt. and staff
patient
nurse
pt. and staff
Nurse and dr Lane
Nurse and staff
Nurse and staff
Nurse, Dr. Lane and staff
nurse

## 2020-06-16 NOTE — CHART NOTE - NSCHARTNOTEFT_GEN_A_CORE
Spoke with Litzy at bedside-- All concerns addressed including but not limited to diagnosis, treatment plan and overall prognosis     Also reviewed and completed MOLST Form as well as Pt wo capacity form.  Pt does not have capacity to understand detailed conversation regarding advance directives.  Placed in chart.  Pt is Full Resuscitation     Advance care discussion [phone]

## 2020-06-16 NOTE — PROGRESS NOTE ADULT - ATTENDING COMMENTS
Patient seen and examined. Trach changed to cuffless 6 portex at bedside without complication.   patient may be able to be capped and placed on nasal cannula in the near future.

## 2020-06-16 NOTE — PROGRESS NOTE ADULT - ASSESSMENT
PROBLEMS;    febrile illness- GNR in sputum  s/p staph bactermia/sputum pseudomonas/staph  Ac on chronic hypercapnic & hypoxamic respiratory failure-s/p trach & PEG  sputum-Few Pseudomonas aeruginosa (Carbapenem Resistant)/Moderate Methicillin resistant Staphylococcus aureus  afib with RVR  [facility]AY  AC flare of COPD/chronic vs acute on chronic bronchitis  Mild interstitial lung disease-NSIP h/o smoking  COVID negativex2  Pulmonary hypertension  Nonobstructing stone in the left ureterovesicular junction/ nonobstructing stone in the lower pole right kidney.  Subacute hemorrhage in the right rectus abdominis along the anterior sheath, measures 1.6 cm in thickness and extends from the umbilicus to the inferior myotendinous junction  Cirrhosis  Mild splenomegaly-portal venous hypertension.  Chronic afib w Watchman device  CHF  BPH  GERD  ETOH abuse  seizures disorder    PLAN;    pulmonary slow recovery-on trach collar  iv rhocephin  Tube feeding as tolerated  supportive care  covid repeat testing-neg  Eliquis/asa 81  d/w staff

## 2020-06-16 NOTE — PROGRESS NOTE ADULT - ASSESSMENT
IMP:    66 y/o male w chronic AFib w Watchman device, HFpEF , COPD on home O2, HTN, ETOH use and pt of size admitted on 4/18 with COPD exacerbation--RRT called on 4/23 for AMS requiring intubation--extubated on 4/26 then re intubated on 4/29.  S/P Trach and PEG on 5/15 and tolerating TC  MSSA sepsis and CRP in sputum--Completed IV Ancef (6/5) and completed Cipro.  No central access   Chronic resp failure  TM Encephalopathy and agitation  Possible UTI sepsis wo indwelling cath    Plan:    Cont with CTX (2)  Follow up Cxs  TC as tolerated--trach change to fenestrated trach and Cap  Xanax to 0.5 q 6  Seroquel and Oxy (QTc < 450)  Cont clopidogrel and ASA for CAD and AFib  Cont Dilt 90 q6  Added BBx 25 q 12   TF to goal  HOB > 30  Started cardura   DVT prophy--SC and LMWH  OOB or sitting position today   Check labs in AM    ICU care--d/w ICU staff on multi disciplinary rounds

## 2020-06-17 LAB
-  AMIKACIN: SIGNIFICANT CHANGE UP
-  AMIKACIN: SIGNIFICANT CHANGE UP
-  AMOXICILLIN/CLAVULANIC ACID: SIGNIFICANT CHANGE UP
-  AMPICILLIN/SULBACTAM: SIGNIFICANT CHANGE UP
-  AMPICILLIN: SIGNIFICANT CHANGE UP
-  AZTREONAM: SIGNIFICANT CHANGE UP
-  AZTREONAM: SIGNIFICANT CHANGE UP
-  CEFAZOLIN: SIGNIFICANT CHANGE UP
-  CEFEPIME: SIGNIFICANT CHANGE UP
-  CEFEPIME: SIGNIFICANT CHANGE UP
-  CEFOXITIN: SIGNIFICANT CHANGE UP
-  CEFTAZIDIME: SIGNIFICANT CHANGE UP
-  CEFTRIAXONE: SIGNIFICANT CHANGE UP
-  CIPROFLOXACIN: SIGNIFICANT CHANGE UP
-  CIPROFLOXACIN: SIGNIFICANT CHANGE UP
-  ERTAPENEM: SIGNIFICANT CHANGE UP
-  GENTAMICIN: SIGNIFICANT CHANGE UP
-  GENTAMICIN: SIGNIFICANT CHANGE UP
-  IMIPENEM: SIGNIFICANT CHANGE UP
-  LEVOFLOXACIN: SIGNIFICANT CHANGE UP
-  LEVOFLOXACIN: SIGNIFICANT CHANGE UP
-  MEROPENEM: SIGNIFICANT CHANGE UP
-  MEROPENEM: SIGNIFICANT CHANGE UP
-  PIPERACILLIN/TAZOBACTAM: SIGNIFICANT CHANGE UP
-  PIPERACILLIN/TAZOBACTAM: SIGNIFICANT CHANGE UP
-  TOBRAMYCIN: SIGNIFICANT CHANGE UP
-  TOBRAMYCIN: SIGNIFICANT CHANGE UP
-  TRIMETHOPRIM/SULFAMETHOXAZOLE: SIGNIFICANT CHANGE UP
ANION GAP SERPL CALC-SCNC: 3 MMOL/L — LOW (ref 5–17)
BUN SERPL-MCNC: 27 MG/DL — HIGH (ref 7–23)
CALCIUM SERPL-MCNC: 9.2 MG/DL — SIGNIFICANT CHANGE UP (ref 8.5–10.1)
CHLORIDE SERPL-SCNC: 102 MMOL/L — SIGNIFICANT CHANGE UP (ref 96–108)
CO2 SERPL-SCNC: 31 MMOL/L — SIGNIFICANT CHANGE UP (ref 22–31)
CREAT SERPL-MCNC: 0.55 MG/DL — SIGNIFICANT CHANGE UP (ref 0.5–1.3)
CULTURE RESULTS: NO GROWTH — SIGNIFICANT CHANGE UP
CULTURE RESULTS: SIGNIFICANT CHANGE UP
GLUCOSE SERPL-MCNC: 113 MG/DL — HIGH (ref 70–99)
HCT VFR BLD CALC: 30.5 % — LOW (ref 39–50)
HGB BLD-MCNC: 9.8 G/DL — LOW (ref 13–17)
MCHC RBC-ENTMCNC: 30.2 PG — SIGNIFICANT CHANGE UP (ref 27–34)
MCHC RBC-ENTMCNC: 32.1 GM/DL — SIGNIFICANT CHANGE UP (ref 32–36)
MCV RBC AUTO: 93.8 FL — SIGNIFICANT CHANGE UP (ref 80–100)
METHOD TYPE: SIGNIFICANT CHANGE UP
METHOD TYPE: SIGNIFICANT CHANGE UP
ORGANISM # SPEC MICROSCOPIC CNT: SIGNIFICANT CHANGE UP
PLATELET # BLD AUTO: 227 K/UL — SIGNIFICANT CHANGE UP (ref 150–400)
POTASSIUM SERPL-MCNC: 3.8 MMOL/L — SIGNIFICANT CHANGE UP (ref 3.5–5.3)
POTASSIUM SERPL-SCNC: 3.8 MMOL/L — SIGNIFICANT CHANGE UP (ref 3.5–5.3)
RBC # BLD: 3.25 M/UL — LOW (ref 4.2–5.8)
RBC # FLD: 13.4 % — SIGNIFICANT CHANGE UP (ref 10.3–14.5)
SODIUM SERPL-SCNC: 136 MMOL/L — SIGNIFICANT CHANGE UP (ref 135–145)
SPECIMEN SOURCE: SIGNIFICANT CHANGE UP
SPECIMEN SOURCE: SIGNIFICANT CHANGE UP
WBC # BLD: 6.89 K/UL — SIGNIFICANT CHANGE UP (ref 3.8–10.5)
WBC # FLD AUTO: 6.89 K/UL — SIGNIFICANT CHANGE UP (ref 3.8–10.5)

## 2020-06-17 PROCEDURE — 99232 SBSQ HOSP IP/OBS MODERATE 35: CPT

## 2020-06-17 RX ORDER — MINERAL OIL
133 OIL (ML) MISCELLANEOUS ONCE
Refills: 0 | Status: COMPLETED | OUTPATIENT
Start: 2020-06-17 | End: 2020-06-17

## 2020-06-17 RX ADMIN — Medication 0.5 MILLIGRAM(S): at 05:36

## 2020-06-17 RX ADMIN — Medication 90 MILLIGRAM(S): at 05:36

## 2020-06-17 RX ADMIN — Medication 25 MILLIGRAM(S): at 21:48

## 2020-06-17 RX ADMIN — CHLORHEXIDINE GLUCONATE 15 MILLILITER(S): 213 SOLUTION TOPICAL at 09:35

## 2020-06-17 RX ADMIN — BUDESONIDE AND FORMOTEROL FUMARATE DIHYDRATE 2 PUFF(S): 160; 4.5 AEROSOL RESPIRATORY (INHALATION) at 08:05

## 2020-06-17 RX ADMIN — ENOXAPARIN SODIUM 40 MILLIGRAM(S): 100 INJECTION SUBCUTANEOUS at 09:34

## 2020-06-17 RX ADMIN — GABAPENTIN 400 MILLIGRAM(S): 400 CAPSULE ORAL at 21:47

## 2020-06-17 RX ADMIN — FAMOTIDINE 20 MILLIGRAM(S): 10 INJECTION INTRAVENOUS at 21:48

## 2020-06-17 RX ADMIN — Medication 25 MILLIGRAM(S): at 09:35

## 2020-06-17 RX ADMIN — Medication 90 MILLIGRAM(S): at 11:22

## 2020-06-17 RX ADMIN — Medication 100 MILLIGRAM(S): at 09:35

## 2020-06-17 RX ADMIN — ALBUTEROL 2 PUFF(S): 90 AEROSOL, METERED ORAL at 11:57

## 2020-06-17 RX ADMIN — CEFTRIAXONE 1000 MILLIGRAM(S): 500 INJECTION, POWDER, FOR SOLUTION INTRAMUSCULAR; INTRAVENOUS at 09:34

## 2020-06-17 RX ADMIN — CHLORHEXIDINE GLUCONATE 15 MILLILITER(S): 213 SOLUTION TOPICAL at 21:49

## 2020-06-17 RX ADMIN — GABAPENTIN 400 MILLIGRAM(S): 400 CAPSULE ORAL at 13:11

## 2020-06-17 RX ADMIN — POLYETHYLENE GLYCOL 3350 17 GRAM(S): 17 POWDER, FOR SOLUTION ORAL at 09:34

## 2020-06-17 RX ADMIN — Medication 400 MILLIGRAM(S): at 15:25

## 2020-06-17 RX ADMIN — TIOTROPIUM BROMIDE 1 CAPSULE(S): 18 CAPSULE ORAL; RESPIRATORY (INHALATION) at 08:06

## 2020-06-17 RX ADMIN — QUETIAPINE FUMARATE 150 MILLIGRAM(S): 200 TABLET, FILM COATED ORAL at 09:34

## 2020-06-17 RX ADMIN — GABAPENTIN 400 MILLIGRAM(S): 400 CAPSULE ORAL at 05:36

## 2020-06-17 RX ADMIN — ALBUTEROL 2 PUFF(S): 90 AEROSOL, METERED ORAL at 08:04

## 2020-06-17 RX ADMIN — Medication 0.5 MILLIGRAM(S): at 17:21

## 2020-06-17 RX ADMIN — Medication 2 MILLIGRAM(S): at 21:48

## 2020-06-17 RX ADMIN — Medication 90 MILLIGRAM(S): at 23:05

## 2020-06-17 RX ADMIN — Medication 5 MILLIGRAM(S): at 21:48

## 2020-06-17 RX ADMIN — Medication 81 MILLIGRAM(S): at 09:35

## 2020-06-17 RX ADMIN — ENOXAPARIN SODIUM 40 MILLIGRAM(S): 100 INJECTION SUBCUTANEOUS at 21:49

## 2020-06-17 RX ADMIN — SENNA PLUS 2 TABLET(S): 8.6 TABLET ORAL at 21:48

## 2020-06-17 RX ADMIN — CLOPIDOGREL BISULFATE 75 MILLIGRAM(S): 75 TABLET, FILM COATED ORAL at 09:35

## 2020-06-17 RX ADMIN — Medication 0.5 MILLIGRAM(S): at 23:05

## 2020-06-17 RX ADMIN — QUETIAPINE FUMARATE 200 MILLIGRAM(S): 200 TABLET, FILM COATED ORAL at 21:50

## 2020-06-17 RX ADMIN — Medication 90 MILLIGRAM(S): at 17:21

## 2020-06-17 RX ADMIN — Medication 0.5 MILLIGRAM(S): at 11:22

## 2020-06-17 RX ADMIN — FAMOTIDINE 20 MILLIGRAM(S): 10 INJECTION INTRAVENOUS at 09:35

## 2020-06-17 RX ADMIN — Medication 133 MILLILITER(S): at 10:25

## 2020-06-17 RX ADMIN — ALBUTEROL 2 PUFF(S): 90 AEROSOL, METERED ORAL at 16:06

## 2020-06-17 NOTE — PROGRESS NOTE ADULT - ASSESSMENT
IMP:    64 y/o male w chronic AFib w Watchman device, HFpEF , COPD on home O2, HTN, ETOH use and pt of size admitted on 4/18 with COPD exacerbation--RRT called on 4/23 for AMS requiring intubation--extubated on 4/26 then re intubated on 4/29.  S/P Trach and PEG on 5/15 and tolerating TC  MSSA sepsis and CRP in sputum--Completed IV Ancef (6/5) and completed Cipro.  No central access   Chronic resp failure  TM Encephalopathy and agitation  Possible UTI sepsis wo indwelling cath     Plan:    Cont with CTX (3)  Follow up Cxs  Enema   Cap trach today  Xanax to 0.5 q 6  Seroquel and Oxy (QTc < 450)  Cont clopidogrel and ASA for CAD and AFib  Cont Dilt 90 q6  Added BBx 25 q 12   TF to goal  HOB > 30  Started cardura   DVT prophy--SC and LMWH  Restraint to prevent self harm and removal of medical devices  No need for daily labs    ICU care--d/w ICU staff on multi disciplinary rounds and Litzy-- All concerns addressed including but not limited to diagnosis, treatment plan and overall prognosis

## 2020-06-17 NOTE — PROGRESS NOTE ADULT - SUBJECTIVE AND OBJECTIVE BOX
Subjective:    pat no new complaint, s/p caped trach, lying in bed.    Home Medications:  acetaminophen 325 mg oral tablet: 2 tab(s) orally every 6 hours, As needed, Temp greater or equal to 38.5C (101.3F) (2020 12:53)  albuterol 90 mcg/inh inhalation aerosol: 2 puff(s) inhaled every 4 hours (2020 12:53)  ALPRAZolam 0.5 mg oral tablet: 1 tab(s) orally every 6 hours (2020 12:53)  aspirin 81 mg oral tablet, chewable: 1 tab(s) orally once a day (2020 12:53)  clopidogrel 75 mg oral tablet: 1 tab(s) orally once a day (2020 12:53)  dilTIAZem 90 mg oral tablet: 1 tab(s) orally every 6 hours (2020 12:53)  doxazosin 2 mg oral tablet: 1 tab(s) orally once a day (at bedtime) (2020 12:53)  gabapentin 400 mg oral capsule: 1 cap(s) orally 3 times a day (2020 12:53)  pantoprazole 40 mg oral granule, enteric coated: 40 milligram(s) orally once a day (2020 03:12)  QUEtiapine 100 mg oral tablet: 1 tab(s) orally once a day (at bedtime) (2020 12:53)  QUEtiapine 50 mg oral tablet: 1 tab(s) orally once a day (2020 12:53)  Spiriva 18 mcg inhalation capsule: 1 cap(s) inhaled once a day (2020 03:12)  thiamine 100 mg oral tablet: 1 tab(s) orally once a day (2020 12:53)    MEDICATIONS  (STANDING):  ALBUTerol    90 MICROgram(s) HFA Inhaler 2 Puff(s) Inhalation every 4 hours  ALPRAZolam 0.5 milliGRAM(s) Oral every 6 hours  aspirin  chewable 81 milliGRAM(s) Oral daily  budesonide 160 MICROgram(s)/formoterol 4.5 MICROgram(s) Inhaler 2 Puff(s) Inhalation two times a day  cefTRIAXone Injectable.      cefTRIAXone Injectable. 1000 milliGRAM(s) IV Push every 24 hours  chlorhexidine 0.12% Liquid 15 milliLiter(s) Oral Mucosa every 12 hours  clopidogrel Tablet 75 milliGRAM(s) Oral daily  diltiazem    Tablet 90 milliGRAM(s) Oral every 6 hours  doxazosin 2 milliGRAM(s) Oral at bedtime  enoxaparin Injectable 40 milliGRAM(s) SubCutaneous every 12 hours  famotidine    Tablet 20 milliGRAM(s) Oral two times a day  gabapentin 400 milliGRAM(s) Oral three times a day  melatonin 5 milliGRAM(s) Oral at bedtime  metoprolol tartrate 25 milliGRAM(s) Oral two times a day  polyethylene glycol 3350 17 Gram(s) Oral daily  QUEtiapine 200 milliGRAM(s) Oral at bedtime  QUEtiapine 150 milliGRAM(s) Oral daily  senna 2 Tablet(s) Oral at bedtime  thiamine 100 milliGRAM(s) Oral daily  tiotropium 18 MICROgram(s) Capsule 1 Capsule(s) Inhalation daily    MEDICATIONS  (PRN):  acetaminophen   Tablet .. 650 milliGRAM(s) Oral every 6 hours PRN Temp greater or equal to 38.5C (101.3F)  ibuprofen  Tablet. 400 milliGRAM(s) Oral every 6 hours PRN Moderate Pain (4 - 6)      Allergies    Ceclor (Rash)  Duricef (Rash)    Intolerances        Vital Signs Last 24 Hrs  T(C): 37.8 (2020 10:00), Max: 37.8 (2020 10:00)  T(F): 100.1 (2020 10:00), Max: 100.1 (2020 10:00)  HR: 85 (2020 12:01) (79 - 117)  BP: 104/61 (2020 12:00) (89/72 - 131/70)  BP(mean): 69 (2020 12:00) (63 - 475)  RR: 22 (2020 12:00) (17 - 29)  SpO2: 100% (2020 12:01) (95% - 100%)      PHYSICAL EXAMINATION:    NECK:  Supple. No lymphadenopathy. Jugular venous pressure not elevated. Carotids equal.   HEART:   The cardiac impulse has a normal quality. Reg., Nl S1 and S2.  There are no murmurs, rubs or gallops noted  CHEST:  Chest crackles to auscultation. Normal respiratory effort.  ABDOMEN:  Soft and nontender.   EXTREMITIES:  There is no edema.       LABS:                        9.8    6.89  )-----------( 227      ( 2020 07:28 )             30.5     -    136  |  102  |  27<H>  ----------------------------<  113<H>  3.8   |  31  |  0.55    Ca    9.2      2020 07:28        Urinalysis Basic - ( 15 Jackson 2020 14:15 )    Color: Yellow / Appearance: Clear / S.015 / pH: x  Gluc: x / Ketone: Trace  / Bili: Negative / Urobili: 4 mg/dL   Blood: x / Protein: 15 mg/dL / Nitrite: Negative   Leuk Esterase: Moderate / RBC: 3-5 /HPF / WBC >50   Sq Epi: x / Non Sq Epi: Occasional / Bacteria: TNTC    Culture - Sputum . (06.15.20 @ 14:15)    Gram Stain:   Numerous polymorphonuclear leukocytes per low power field  Moderate Squamous epithelial cells per low power field  Numerous Gram Negative Rods per oil power field    Specimen Source: .Sputum Sputum    Culture Results:   Moderate Pseudomonas aeruginosa  Moderate Proteus mirabilis  Normal Respiratory Faith absent

## 2020-06-17 NOTE — PROGRESS NOTE ADULT - ASSESSMENT
PROBLEMS;    febrile illness- GNR in sputum-pseudomonas/proteus  s/p staph bactermia/sputum pseudomonas/staph  Ac on chronic hypercapnic & hypoxamic respiratory failure-s/p trach & PEG  sputum-Few Pseudomonas aeruginosa (Carbapenem Resistant)/Moderate Methicillin resistant Staphylococcus aureus  afib with RVR  OHS/VIJAY  AC flare of COPD/chronic vs acute on chronic bronchitis  Mild interstitial lung disease-NSIP h/o smoking  COVID negativex2  Pulmonary hypertension  Nonobstructing stone in the left ureterovesicular junction/ nonobstructing stone in the lower pole right kidney.  Subacute hemorrhage in the right rectus abdominis along the anterior sheath, measures 1.6 cm in thickness and extends from the umbilicus to the inferior myotendinous junction  Cirrhosis  Mild splenomegaly-portal venous hypertension.  Chronic afib w Watchman device  CHF  BPH  GERD  ETOH abuse  seizures disorder    PLAN;    pulmonary slow recovery-on trach collar  iv rhocephin  Tube feeding as tolerated  supportive care  covid repeat testing-neg  Eliquis/asa 81  d/w staff

## 2020-06-17 NOTE — PROGRESS NOTE ADULT - SUBJECTIVE AND OBJECTIVE BOX
[facility] # 59  ICU # 54  Vent # 22 and Trach and PEG # 32    CC:  Respiratory Failure     HPI:    64 y/o male w chronic AFib w Watchman device, HFpEF , COPD on home O2, HTN, ETOH use and pt of size admitted on  with COPD exacerbation--RRT called on  for AMS requiring intubation--extubated on  then re intubated on .  S/P Trach and PEG on 5/15 and has failed SBT.  Most recent active issues is persistent MSSA sepsis and CRP in sputum--on IV vanco and Cipro.  No central access      :  Pt seen and examined in ICU--vented-- Encephalopathy.  still MSSA + in Bcx  :  Tolerated PSV 12 x 3 hrs on .  SBT ongoing now.  Poor mentation.  Repeat BCx sent.  Abx changed to Ancef  :  PSV X 4.5 hrs on .  Rapid AFib O/N.  Still Encephalopathy.  COVID sent.   BCx still P.     :  Case d/w Dr horne.  Pt seen and examined in ICU--T+P--attempts to follow commands but overall Encephalopathic.  SBT ongoing.  Tm 99.6  :  No restraints overnight.  Calm.  + BM yesterday.  On vent support.  Stable vitals.  No fever.  Stable for placement   6/3:  No restraints.  Slightly more awake and alert.  On Vent support.  Stable for placement.  Stable vitals and no fevers  :  No restraints. No haldol.  Xanzx.  Awake and following commands and attempting to speak  :  No events overnight.  Able to engage and follow simple commands.  No restraints.  Last day of Ancef  :  No events overnight.  Tolerated SBT 5.5 hrs on .  Awake.  Follows commands.  Restless but but responsive  :  No events overnight.  Awake and alert.  Lucid.  SBT ongoing     6/15:  Case d/w dr horne--seen and examined in ICU--on TC x several days.  Awake but still restless and pulling on tubes.  In RAFib   :  HR improved with BBx.  New temp spike to 101.7 on 6/15--possible UTI and started on CTX--No fevers.  CXR (6/15)-- Personally reviewed--no infiltrates   :  UCx negative.  No fevers.  Normal WBC.  Trach changed to 6 cuffless/fenestrated .  Restless pulling on lines/trach.  No BM X 3 days    PMH:  As above.     PSH:  As above.     FH: Non Contributory other than those listed in HPI    Social History:  Unobtainable due to clinical condition     MEDICATIONS  (STANDING):  ALBUTerol    90 MICROgram(s) HFA Inhaler 2 Puff(s) Inhalation every 4 hours  ALPRAZolam 0.5 milliGRAM(s) Oral every 6 hours  aspirin  chewable 81 milliGRAM(s) Oral daily  budesonide 160 MICROgram(s)/formoterol 4.5 MICROgram(s) Inhaler 2 Puff(s) Inhalation two times a day  cefTRIAXone Injectable.      cefTRIAXone Injectable. 1000 milliGRAM(s) IV Push every 24 hours  chlorhexidine 0.12% Liquid 15 milliLiter(s) Oral Mucosa every 12 hours  clopidogrel Tablet 75 milliGRAM(s) Oral daily  diltiazem    Tablet 90 milliGRAM(s) Oral every 6 hours  doxazosin 2 milliGRAM(s) Oral at bedtime  enoxaparin Injectable 40 milliGRAM(s) SubCutaneous every 12 hours  famotidine    Tablet 20 milliGRAM(s) Oral two times a day  gabapentin 400 milliGRAM(s) Oral three times a day  melatonin 5 milliGRAM(s) Oral at bedtime  metoprolol tartrate 25 milliGRAM(s) Oral two times a day  mineral oil enema 133 milliLiter(s) Rectal once  polyethylene glycol 3350 17 Gram(s) Oral daily  QUEtiapine 200 milliGRAM(s) Oral at bedtime  QUEtiapine 150 milliGRAM(s) Oral daily  senna 2 Tablet(s) Oral at bedtime  thiamine 100 milliGRAM(s) Oral daily  tiotropium 18 MICROgram(s) Capsule 1 Capsule(s) Inhalation daily    MEDICATIONS  (PRN):  acetaminophen   Tablet .. 650 milliGRAM(s) Oral every 6 hours PRN Temp greater or equal to 38.5C (101.3F)  ibuprofen  Tablet. 400 milliGRAM(s) Oral every 6 hours PRN Moderate Pain (4 - 6)      Allergies: NKDA    ROS:  SEE BELOW        ICU Vital Signs Last 24 Hrs  T(C): 37.4 (2020 04:00), Max: 37.6 (2020 00:00)  T(F): 99.3 (2020 04:00), Max: 99.7 (2020 00:00)  HR: 110 (2020 08:11) (74 - 117)  BP: 120/73 (2020 07:00) (87/46 - 131/70)  BP(mean): 85 (2020 07:00) (56 - 475)  ABP: --  ABP(mean): --  RR: 27 (2020 07:00) (17 - 32)  SpO2: 100% (2020 08:11) (95% - 100%)          I&O's Summary    2020 07:01  -  2020 07:00  --------------------------------------------------------  IN: 3469 mL / OUT: 300 mL / NET: 3169 mL        Physical Exam:  SEE BELOW                          9.8    6.89  )-----------( 227      ( 2020 07:28 )             30.5       06-17    136  |  102  |  27<H>  ----------------------------<  113<H>  3.8   |  31  |  0.55    Ca    9.2      2020 07:28                  Urinalysis Basic - ( 15 Jackson 2020 14:15 )    Color: Yellow / Appearance: Clear / S.015 / pH: x  Gluc: x / Ketone: Trace  / Bili: Negative / Urobili: 4 mg/dL   Blood: x / Protein: 15 mg/dL / Nitrite: Negative   Leuk Esterase: Moderate / RBC: 3-5 /HPF / WBC >50   Sq Epi: x / Non Sq Epi: Occasional / Bacteria: TNTC        DVT Prophylaxis:                                                            Contraindication:     Advanced Directives:    Discussed with:    Visit Information:  Time spent excluding procedure:      ** Time is exclusive of billed procedures and/or teaching and/or routine family updates.

## 2020-06-17 NOTE — PROGRESS NOTE ADULT - SUBJECTIVE AND OBJECTIVE BOX
Subjective:  No acute events overnight.  On NC, trach is capped.    Vital Signs:  Vital Signs Last 24 Hrs  T(C): 37.8 (06-17-20 @ 10:00), Max: 37.8 (06-17-20 @ 10:00)  T(F): 100.1 (06-17-20 @ 10:00), Max: 100.1 (06-17-20 @ 10:00)  HR: 88 (06-17-20 @ 11:00) (74 - 117)  BP: 103/81 (06-17-20 @ 11:00) (87/46 - 131/70)  RR: 21 (06-17-20 @ 11:00) (17 - 30)  SpO2: 100% (06-17-20 @ 11:00) (95% - 100%) on NC    Telemetry/Alarms: atrial fibrillation    Relevant labs, radiology and Medications reviewed                        9.8    6.89  )-----------( 227      ( 17 Jun 2020 07:28 )             30.5     06-17    136  |  102  |  27<H>  ----------------------------<  113<H>  3.8   |  31  |  0.55    Ca    9.2      17 Jun 2020 07:28        MEDICATIONS  (STANDING):  ALBUTerol    90 MICROgram(s) HFA Inhaler 2 Puff(s) Inhalation every 4 hours  ALPRAZolam 0.5 milliGRAM(s) Oral every 6 hours  aspirin  chewable 81 milliGRAM(s) Oral daily  budesonide 160 MICROgram(s)/formoterol 4.5 MICROgram(s) Inhaler 2 Puff(s) Inhalation two times a day  cefTRIAXone Injectable.      cefTRIAXone Injectable. 1000 milliGRAM(s) IV Push every 24 hours  chlorhexidine 0.12% Liquid 15 milliLiter(s) Oral Mucosa every 12 hours  clopidogrel Tablet 75 milliGRAM(s) Oral daily  diltiazem    Tablet 90 milliGRAM(s) Oral every 6 hours  doxazosin 2 milliGRAM(s) Oral at bedtime  enoxaparin Injectable 40 milliGRAM(s) SubCutaneous every 12 hours  famotidine    Tablet 20 milliGRAM(s) Oral two times a day  gabapentin 400 milliGRAM(s) Oral three times a day  melatonin 5 milliGRAM(s) Oral at bedtime  metoprolol tartrate 25 milliGRAM(s) Oral two times a day  polyethylene glycol 3350 17 Gram(s) Oral daily  QUEtiapine 200 milliGRAM(s) Oral at bedtime  QUEtiapine 150 milliGRAM(s) Oral daily  senna 2 Tablet(s) Oral at bedtime  thiamine 100 milliGRAM(s) Oral daily  tiotropium 18 MICROgram(s) Capsule 1 Capsule(s) Inhalation daily    MEDICATIONS  (PRN):  acetaminophen   Tablet .. 650 milliGRAM(s) Oral every 6 hours PRN Temp greater or equal to 38.5C (101.3F)  ibuprofen  Tablet. 400 milliGRAM(s) Oral every 6 hours PRN Moderate Pain (4 - 6)      Physical exam  Gen: NAD breathing comfortably  Neuro: AO  Card: S1 S2  Pulm: Equal bilaterally  Abd: Soft NT ND PEG in place  Ext: no edema      I&O's Summary    16 Jun 2020 07:01  -  17 Jun 2020 07:00  --------------------------------------------------------  IN: 3469 mL / OUT: 300 mL / NET: 3169 mL        Assessment  65y Male  w/ PAST MEDICAL & SURGICAL HISTORY:  Chronic CHF  COPD without exacerbation  Atrial fibrillation  Presence of Watchman left atrial appendage closure device  Hypertension  GERD (gastroesophageal reflux disease)  Chronic obstructive pulmonary disease (COPD)  Anemia  Falls  Meningitis  Collapsed lung  Alcohol withdrawal  Emphysema of lung  Cirrhosis  CHF (congestive heart failure)  Poor historian  Alcohol abuse  Chronic atrial fibrillation  Unilateral amputation of lower extremity below knee  Presence of Watchman left atrial appendage closure device  S/P BKA (below knee amputation) unilateral, left    Patient is a 65y old  Male who presents with a chief complaint of acute hypoxemic, hypercapneic resp failure  COPD exacerbation (17 Jun 2020 08:40)  .  1.  Chronic respiratory failure  2.  COPD exacerbation  3.  Pneumonia  4.  Leukocytosis and fevers    Cap trials.  Continue nasal canula.  Follow up urine and sputum cultures.  Continue antibiotics as per primary team.      Discussed with Cardiothoracic Team at AM rounds.

## 2020-06-18 PROCEDURE — 99232 SBSQ HOSP IP/OBS MODERATE 35: CPT

## 2020-06-18 RX ADMIN — Medication 5 MILLIGRAM(S): at 21:08

## 2020-06-18 RX ADMIN — Medication 90 MILLIGRAM(S): at 11:03

## 2020-06-18 RX ADMIN — Medication 81 MILLIGRAM(S): at 11:03

## 2020-06-18 RX ADMIN — Medication 0.5 MILLIGRAM(S): at 06:17

## 2020-06-18 RX ADMIN — Medication 100 MILLIGRAM(S): at 11:03

## 2020-06-18 RX ADMIN — Medication 0.5 MILLIGRAM(S): at 23:09

## 2020-06-18 RX ADMIN — Medication 90 MILLIGRAM(S): at 23:09

## 2020-06-18 RX ADMIN — GABAPENTIN 400 MILLIGRAM(S): 400 CAPSULE ORAL at 21:08

## 2020-06-18 RX ADMIN — ENOXAPARIN SODIUM 40 MILLIGRAM(S): 100 INJECTION SUBCUTANEOUS at 21:07

## 2020-06-18 RX ADMIN — GABAPENTIN 400 MILLIGRAM(S): 400 CAPSULE ORAL at 13:56

## 2020-06-18 RX ADMIN — QUETIAPINE FUMARATE 100 MILLIGRAM(S): 200 TABLET, FILM COATED ORAL at 11:02

## 2020-06-18 RX ADMIN — Medication 0.5 MILLIGRAM(S): at 17:30

## 2020-06-18 RX ADMIN — Medication 2 MILLIGRAM(S): at 21:08

## 2020-06-18 RX ADMIN — POLYETHYLENE GLYCOL 3350 17 GRAM(S): 17 POWDER, FOR SOLUTION ORAL at 11:03

## 2020-06-18 RX ADMIN — Medication 0.5 MILLIGRAM(S): at 11:03

## 2020-06-18 RX ADMIN — Medication 25 MILLIGRAM(S): at 11:03

## 2020-06-18 RX ADMIN — GABAPENTIN 400 MILLIGRAM(S): 400 CAPSULE ORAL at 06:17

## 2020-06-18 RX ADMIN — CHLORHEXIDINE GLUCONATE 15 MILLILITER(S): 213 SOLUTION TOPICAL at 11:02

## 2020-06-18 RX ADMIN — Medication 90 MILLIGRAM(S): at 17:29

## 2020-06-18 RX ADMIN — SENNA PLUS 2 TABLET(S): 8.6 TABLET ORAL at 21:07

## 2020-06-18 RX ADMIN — CEFTRIAXONE 1000 MILLIGRAM(S): 500 INJECTION, POWDER, FOR SOLUTION INTRAMUSCULAR; INTRAVENOUS at 11:03

## 2020-06-18 RX ADMIN — ENOXAPARIN SODIUM 40 MILLIGRAM(S): 100 INJECTION SUBCUTANEOUS at 11:03

## 2020-06-18 RX ADMIN — CLOPIDOGREL BISULFATE 75 MILLIGRAM(S): 75 TABLET, FILM COATED ORAL at 11:02

## 2020-06-18 RX ADMIN — QUETIAPINE FUMARATE 200 MILLIGRAM(S): 200 TABLET, FILM COATED ORAL at 21:09

## 2020-06-18 RX ADMIN — FAMOTIDINE 20 MILLIGRAM(S): 10 INJECTION INTRAVENOUS at 11:03

## 2020-06-18 RX ADMIN — FAMOTIDINE 20 MILLIGRAM(S): 10 INJECTION INTRAVENOUS at 21:08

## 2020-06-18 RX ADMIN — Medication 25 MILLIGRAM(S): at 21:08

## 2020-06-18 RX ADMIN — CHLORHEXIDINE GLUCONATE 15 MILLILITER(S): 213 SOLUTION TOPICAL at 21:10

## 2020-06-18 NOTE — PROGRESS NOTE ADULT - ASSESSMENT
IMP:    64 y/o male w chronic AFib w Watchman device, HFpEF , COPD on home O2, HTN, ETOH use and pt of size admitted on 4/18 with COPD exacerbation--RRT called on 4/23 for AMS requiring intubation--extubated on 4/26 then re intubated on 4/29.  S/P Trach and PEG on 5/15 and tolerating TC  MSSA sepsis and CRP in sputum--Completed IV Ancef (6/5) and completed Cipro.  No central access   Chronic resp failure  TM Encephalopathy and agitation--Not getting better-- ?? paradoxical reaction to Seroquel   Possible UTI sepsis wo indwelling cath--completed 3 days of CTX.  UCx negative but sent after starting Abx    Plan:    Stop CTX  PRN Enema   Cap trach   Xanax to 0.5 q 6  Seroquel and Oxy (QTc < 450)--will decrease to 100 daily and cont to taper down   Cont clopidogrel and ASA for CAD and AFib  Cont Dilt 90 q6  Added BBx 25 q 12   TF to goal  HOB > 30  Started cardura   DVT prophy--SC and LMWH  Restraint to prevent self harm and removal of medical devices  No need for daily labs    ICU care--d/w ICU staff on multi disciplinary rounds and Litzy-- All concerns addressed including but not limited to diagnosis, treatment plan and overall prognosis

## 2020-06-18 NOTE — PROGRESS NOTE ADULT - SUBJECTIVE AND OBJECTIVE BOX
[facility]  ICU # 55  Vent # 22 and Trach and PEG # 33    CC:  Respiratory Failure     HPI:    64 y/o male w chronic AFib w Watchman device, HFpEF , COPD on home O2, HTN, ETOH use and pt of size admitted on 4/18 with COPD exacerbation--RRT called on 4/23 for AMS requiring intubation--extubated on 4/26 then re intubated on 4/29.  S/P Trach and PEG on 5/15 and has failed SBT.  Most recent active issues is persistent MSSA sepsis and CRP in sputum--on IV vanco and Cipro.  No central access      5/22:  Pt seen and examined in ICU--vented-- Encephalopathy.  still MSSA + in Bcx  5/23:  Tolerated PSV 12 x 3 hrs on 5/22.  SBT ongoing now.  Poor mentation.  Repeat BCx sent.  Abx changed to Ancef  5/24:  PSV X 4.5 hrs on 5/23.  Rapid AFib O/N.  Still Encephalopathy.  COVID sent.  5/23 BCx still P.     6/1:  Case d/w Dr horne.  Pt seen and examined in ICU--T+P--attempts to follow commands but overall Encephalopathic.  SBT ongoing.  Tm 99.6  6/2:  No restraints overnight.  Calm.  + BM yesterday.  On vent support.  Stable vitals.  No fever.  Stable for placement   6/3:  No restraints.  Slightly more awake and alert.  On Vent support.  Stable for placement.  Stable vitals and no fevers  6/4:  No restraints. No haldol.  Xanzx.  Awake and following commands and attempting to speak  6/5:  No events overnight.  Able to engage and follow simple commands.  No restraints.  Last day of Ancef  6/6:  No events overnight.  Tolerated SBT 5.5 hrs on 6/5.  Awake.  Follows commands.  Restless but but responsive  6/7:  No events overnight.  Awake and alert.  Lucid.  SBT ongoing     6/15:  Case d/w dr horne--seen and examined in ICU--on TC x several days.  Awake but still restless and pulling on tubes.  In RAFib   6/16:  HR improved with BBx.  New temp spike to 101.7 on 6/15--possible UTI and started on CTX--No fevers.  CXR (6/15)-- Personally reviewed--no infiltrates   6/17:  UCx negative.  No fevers.  Normal WBC.  Trach changed to 6 cuffless/fenestrated 6/16.  Restless pulling on lines/trach.  No BM X 3 days  6/18:  No fevers.  Capped.  In recliner.  Still Encephalopathic.  + BM    PMH:  As above.     PSH:  As above.     FH: Non Contributory other than those listed in HPI    Social History:  Unobtainable due to clinical condition     MEDICATIONS  (STANDING):  ALBUTerol    90 MICROgram(s) HFA Inhaler 2 Puff(s) Inhalation every 4 hours  ALPRAZolam 0.5 milliGRAM(s) Oral every 6 hours  aspirin  chewable 81 milliGRAM(s) Oral daily  budesonide 160 MICROgram(s)/formoterol 4.5 MICROgram(s) Inhaler 2 Puff(s) Inhalation two times a day  cefTRIAXone Injectable.      cefTRIAXone Injectable. 1000 milliGRAM(s) IV Push every 24 hours  chlorhexidine 0.12% Liquid 15 milliLiter(s) Oral Mucosa every 12 hours  clopidogrel Tablet 75 milliGRAM(s) Oral daily  diltiazem    Tablet 90 milliGRAM(s) Oral every 6 hours  doxazosin 2 milliGRAM(s) Oral at bedtime  enoxaparin Injectable 40 milliGRAM(s) SubCutaneous every 12 hours  famotidine    Tablet 20 milliGRAM(s) Oral two times a day  gabapentin 400 milliGRAM(s) Oral three times a day  melatonin 5 milliGRAM(s) Oral at bedtime  metoprolol tartrate 25 milliGRAM(s) Oral two times a day  polyethylene glycol 3350 17 Gram(s) Oral daily  QUEtiapine 200 milliGRAM(s) Oral at bedtime  QUEtiapine 100 milliGRAM(s) Oral daily  senna 2 Tablet(s) Oral at bedtime  thiamine 100 milliGRAM(s) Oral daily  tiotropium 18 MICROgram(s) Capsule 1 Capsule(s) Inhalation daily    MEDICATIONS  (PRN):  acetaminophen   Tablet .. 650 milliGRAM(s) Oral every 6 hours PRN Temp greater or equal to 38.5C (101.3F)  ibuprofen  Tablet. 400 milliGRAM(s) Oral every 6 hours PRN Moderate Pain (4 - 6)      Allergies: NKDA    ROS:  SEE BELOW        ICU Vital Signs Last 24 Hrs  T(C): 36.6 (18 Jun 2020 04:00), Max: 37.8 (17 Jun 2020 10:00)  T(F): 97.8 (18 Jun 2020 04:00), Max: 100.1 (17 Jun 2020 10:00)  HR: 88 (18 Jun 2020 06:00) (67 - 107)  BP: 94/43 (18 Jun 2020 06:00) (82/55 - 129/94)  BP(mean): 49 (18 Jun 2020 06:00) (49 - 103)  ABP: --  ABP(mean): --  RR: 31 (18 Jun 2020 06:00) (17 - 31)  SpO2: 98% (18 Jun 2020 06:00) (93% - 100%)          I&O's Summary    17 Jun 2020 07:01  -  18 Jun 2020 07:00  --------------------------------------------------------  IN: 2258 mL / OUT: 0 mL / NET: 2258 mL        Physical Exam:  SEE BELOW                          9.8    6.89  )-----------( 227      ( 17 Jun 2020 07:28 )             30.5       06-17    136  |  102  |  27<H>  ----------------------------<  113<H>  3.8   |  31  |  0.55    Ca    9.2      17 Jun 2020 07:28                      DVT Prophylaxis:                                                            Contraindication:     Advanced Directives:    Discussed with:    Visit Information:  Time spent excluding procedure:      ** Time is exclusive of billed procedures and/or teaching and/or routine family updates.

## 2020-06-18 NOTE — PROGRESS NOTE ADULT - ASSESSMENT
PROBLEMS;    Febrile illness- GNR in sputum-pseudomonas/proteus  s/p staph bactermia/sputum pseudomonas/staph  Ac on chronic hypercapnic & hypoxamic respiratory failure-s/p trach & PEG  sputum-Few Pseudomonas aeruginosa (Carbapenem Resistant)/Moderate Methicillin resistant Staphylococcus aureus  afib with RVR  OHS/VIJAY  AC flare of COPD/chronic vs acute on chronic bronchitis  Mild interstitial lung disease-NSIP h/o smoking  COVID negativex2  Pulmonary hypertension  Nonobstructing stone in the left ureterovesicular junction/ nonobstructing stone in the lower pole right kidney.  Subacute hemorrhage in the right rectus abdominis along the anterior sheath, measures 1.6 cm in thickness and extends from the umbilicus to the inferior myotendinous junction  Cirrhosis  Mild splenomegaly-portal venous hypertension.  Chronic afib w Watchman device  CHF  BPH  GERD  ETOH abuse  seizures disorder    PLAN;    pulmonary slow recovery-trach capped-fio2 2lpm nc  iv rhocephin-decd  Tube feeding as tolerated  supportive care  covid repeat testing-neg  Eliquis/asa 81  d/w staff

## 2020-06-18 NOTE — PROGRESS NOTE ADULT - SUBJECTIVE AND OBJECTIVE BOX
Subjective:    pat on trach with cap, on 2LPM NC sat 92%, no respiratory distress.    Home Medications:  acetaminophen 325 mg oral tablet: 2 tab(s) orally every 6 hours, As needed, Temp greater or equal to 38.5C (101.3F) (02 Jun 2020 12:53)  albuterol 90 mcg/inh inhalation aerosol: 2 puff(s) inhaled every 4 hours (02 Jun 2020 12:53)  ALPRAZolam 0.5 mg oral tablet: 1 tab(s) orally every 6 hours (02 Jun 2020 12:53)  aspirin 81 mg oral tablet, chewable: 1 tab(s) orally once a day (02 Jun 2020 12:53)  clopidogrel 75 mg oral tablet: 1 tab(s) orally once a day (02 Jun 2020 12:53)  dilTIAZem 90 mg oral tablet: 1 tab(s) orally every 6 hours (02 Jun 2020 12:53)  doxazosin 2 mg oral tablet: 1 tab(s) orally once a day (at bedtime) (02 Jun 2020 12:53)  gabapentin 400 mg oral capsule: 1 cap(s) orally 3 times a day (02 Jun 2020 12:53)  pantoprazole 40 mg oral granule, enteric coated: 40 milligram(s) orally once a day (19 Apr 2020 03:12)  QUEtiapine 100 mg oral tablet: 1 tab(s) orally once a day (at bedtime) (02 Jun 2020 12:53)  QUEtiapine 50 mg oral tablet: 1 tab(s) orally once a day (02 Jun 2020 12:53)  Spiriva 18 mcg inhalation capsule: 1 cap(s) inhaled once a day (19 Apr 2020 03:12)  thiamine 100 mg oral tablet: 1 tab(s) orally once a day (02 Jun 2020 12:53)    MEDICATIONS  (STANDING):  ALBUTerol    90 MICROgram(s) HFA Inhaler 2 Puff(s) Inhalation every 4 hours  ALPRAZolam 0.5 milliGRAM(s) Oral every 6 hours  aspirin  chewable 81 milliGRAM(s) Oral daily  budesonide 160 MICROgram(s)/formoterol 4.5 MICROgram(s) Inhaler 2 Puff(s) Inhalation two times a day  chlorhexidine 0.12% Liquid 15 milliLiter(s) Oral Mucosa every 12 hours  clopidogrel Tablet 75 milliGRAM(s) Oral daily  diltiazem    Tablet 90 milliGRAM(s) Oral every 6 hours  doxazosin 2 milliGRAM(s) Oral at bedtime  enoxaparin Injectable 40 milliGRAM(s) SubCutaneous every 12 hours  famotidine    Tablet 20 milliGRAM(s) Oral two times a day  gabapentin 400 milliGRAM(s) Oral three times a day  melatonin 5 milliGRAM(s) Oral at bedtime  metoprolol tartrate 25 milliGRAM(s) Oral two times a day  polyethylene glycol 3350 17 Gram(s) Oral daily  QUEtiapine 200 milliGRAM(s) Oral at bedtime  QUEtiapine 100 milliGRAM(s) Oral daily  senna 2 Tablet(s) Oral at bedtime  thiamine 100 milliGRAM(s) Oral daily  tiotropium 18 MICROgram(s) Capsule 1 Capsule(s) Inhalation daily    MEDICATIONS  (PRN):  acetaminophen   Tablet .. 650 milliGRAM(s) Oral every 6 hours PRN Temp greater or equal to 38.5C (101.3F)  ibuprofen  Tablet. 400 milliGRAM(s) Oral every 6 hours PRN Moderate Pain (4 - 6)      Allergies    Ceclor (Rash)  Duricef (Rash)    Intolerances        Vital Signs Last 24 Hrs  T(C): 36.6 (18 Jun 2020 04:00), Max: 36.8 (18 Jun 2020 00:00)  T(F): 97.8 (18 Jun 2020 04:00), Max: 98.2 (18 Jun 2020 00:00)  HR: 90 (18 Jun 2020 12:00) (67 - 126)  BP: 121/63 (18 Jun 2020 12:00) (82/55 - 129/94)  BP(mean): 74 (18 Jun 2020 12:00) (49 - 103)  RR: 30 (18 Jun 2020 12:00) (17 - 31)  SpO2: 92% (18 Jun 2020 12:00) (92% - 100%)      PHYSICAL EXAMINATION:    NECK:  Supple. No lymphadenopathy. Jugular venous pressure not elevated. Carotids equal.   HEART:   The cardiac impulse has a normal quality. Reg., Nl S1 and S2.  There are no murmurs, rubs or gallops noted  CHEST:  Chest crackles to auscultation. Normal respiratory effort.  ABDOMEN:  Soft and nontender.   EXTREMITIES:  There is no edema.       LABS:                        9.8    6.89  )-----------( 227      ( 17 Jun 2020 07:28 )             30.5     06-17    136  |  102  |  27<H>  ----------------------------<  113<H>  3.8   |  31  |  0.55    Ca    9.2      17 Jun 2020 07:28

## 2020-06-18 NOTE — PROGRESS NOTE ADULT - SUBJECTIVE AND OBJECTIVE BOX
Pt is requesting lab results. Pt would also like to know when he should schedule another follow up. please advise.   Subjective:    Vital Signs:  Vital Signs Last 24 Hrs  T(C): 36.6 (06-18-20 @ 04:00), Max: 37.1 (06-17-20 @ 14:00)  T(F): 97.8 (06-18-20 @ 04:00), Max: 98.8 (06-17-20 @ 14:00)  HR: 107 (06-18-20 @ 10:00) (67 - 107)  BP: 110/50 (06-18-20 @ 10:00) (82/55 - 129/94)  RR: 24 (06-18-20 @ 10:00) (17 - 31)  SpO2: 100% (06-18-20 @ 10:00) (93% - 100%) on (O2)  65y    Telemetry/Alarms:  General: WN/WD NAD  Neurology: Awake, nonfocal, MARIE x 4  Eyes: Scleras clear, PERRLA/ EOMI, Gross vision intact  ENT: Gross hearing intact, grossly patent pharynx, no stridor  Neck: Neck supple, trachea midline, No JVD  Respiratory: CTA B/L, No wheezing, rales, rhonchi  CV: RRR, S1S2, no murmurs, rubs or gallops  Abdominal: Soft, NT, ND  Extremities: No edema, + peripheral pulses  Skin: No Rashes, Hematoma, Ecchymosis  Lymphatic: No Neck, axilla, groin LAD  Psych: Oriented x 3, normal affect  Incisions:   Tubes:    Relevant labs, radiology and Medications reviewed                        9.8    6.89  )-----------( 227      ( 17 Jun 2020 07:28 )             30.5     06-17    136  |  102  |  27<H>  ----------------------------<  113<H>  3.8   |  31  |  0.55    Ca    9.2      17 Jun 2020 07:28        MEDICATIONS  (STANDING):  ALBUTerol    90 MICROgram(s) HFA Inhaler 2 Puff(s) Inhalation every 4 hours  ALPRAZolam 0.5 milliGRAM(s) Oral every 6 hours  aspirin  chewable 81 milliGRAM(s) Oral daily  budesonide 160 MICROgram(s)/formoterol 4.5 MICROgram(s) Inhaler 2 Puff(s) Inhalation two times a day  chlorhexidine 0.12% Liquid 15 milliLiter(s) Oral Mucosa every 12 hours  clopidogrel Tablet 75 milliGRAM(s) Oral daily  diltiazem    Tablet 90 milliGRAM(s) Oral every 6 hours  doxazosin 2 milliGRAM(s) Oral at bedtime  enoxaparin Injectable 40 milliGRAM(s) SubCutaneous every 12 hours  famotidine    Tablet 20 milliGRAM(s) Oral two times a day  gabapentin 400 milliGRAM(s) Oral three times a day  melatonin 5 milliGRAM(s) Oral at bedtime  metoprolol tartrate 25 milliGRAM(s) Oral two times a day  polyethylene glycol 3350 17 Gram(s) Oral daily  QUEtiapine 200 milliGRAM(s) Oral at bedtime  QUEtiapine 100 milliGRAM(s) Oral daily  senna 2 Tablet(s) Oral at bedtime  thiamine 100 milliGRAM(s) Oral daily  tiotropium 18 MICROgram(s) Capsule 1 Capsule(s) Inhalation daily    MEDICATIONS  (PRN):  acetaminophen   Tablet .. 650 milliGRAM(s) Oral every 6 hours PRN Temp greater or equal to 38.5C (101.3F)  ibuprofen  Tablet. 400 milliGRAM(s) Oral every 6 hours PRN Moderate Pain (4 - 6)        Assessment  65y Male  w/ PAST MEDICAL & SURGICAL HISTORY:  Chronic CHF  COPD without exacerbation  Atrial fibrillation  Presence of Watchman left atrial appendage closure device  Hypertension  GERD (gastroesophageal reflux disease)  Chronic obstructive pulmonary disease (COPD)  Anemia  Falls  Meningitis  Collapsed lung  Alcohol withdrawal  Emphysema of lung  Cirrhosis  CHF (congestive heart failure)  Poor historian  Alcohol abuse  Chronic atrial fibrillation  Unilateral amputation of lower extremity below knee  Presence of Watchman left atrial appendage closure device  S/P BKA (below knee amputation) unilateral, left  admitted with complaints of Patient is a 65y old  Male who presents with a chief complaint of acute hypoxemic, hypercapneic resp failure  COPD exacerbation (18 Jun 2020 08:49)

## 2020-06-18 NOTE — PROGRESS NOTE ADULT - ASSESSMENT
Patient is a 65y old  Male with PMH CHF, COPD, and acute resp failure requiring trach in 2015 and 2017, presents with a chief complaint of acute hypoxemic, hypercapnic resp failure, and COPD exacerbation complicated by PNA. S/P trach and PEG 5/15. 6/15 Sputum cultures with proteus and pseudomonas.     C/W capping trial as tolerated.   TF as tolerated  Would not recommend decannulation at this time as this is the third redo trach  Would only consider decannulation after patient has proven stability for >1 month  Antibiotics as per primary team.      Discussed with Cardiothoracic Team at AM rounds.    Thu Veloz PA  Thoracic Sx  #[phone]

## 2020-06-19 LAB
BASE EXCESS BLDA CALC-SCNC: 4 MMOL/L — HIGH (ref -2–2)
GAS PNL BLDA: SIGNIFICANT CHANGE UP
HCO3 BLDA-SCNC: 29 MMOL/L — SIGNIFICANT CHANGE UP (ref 21–29)
PCO2 BLDA: 46 MMHG — SIGNIFICANT CHANGE UP (ref 32–46)
PH BLDA: 7.42 — SIGNIFICANT CHANGE UP (ref 7.35–7.45)
PO2 BLDA: 133 MMHG — HIGH (ref 74–108)
SAO2 % BLDA: 99 % — HIGH (ref 92–96)

## 2020-06-19 PROCEDURE — 99232 SBSQ HOSP IP/OBS MODERATE 35: CPT

## 2020-06-19 RX ADMIN — Medication 2 MILLIGRAM(S): at 21:11

## 2020-06-19 RX ADMIN — GABAPENTIN 400 MILLIGRAM(S): 400 CAPSULE ORAL at 21:11

## 2020-06-19 RX ADMIN — Medication 25 MILLIGRAM(S): at 09:33

## 2020-06-19 RX ADMIN — Medication 81 MILLIGRAM(S): at 09:33

## 2020-06-19 RX ADMIN — BUDESONIDE AND FORMOTEROL FUMARATE DIHYDRATE 2 PUFF(S): 160; 4.5 AEROSOL RESPIRATORY (INHALATION) at 09:22

## 2020-06-19 RX ADMIN — FAMOTIDINE 20 MILLIGRAM(S): 10 INJECTION INTRAVENOUS at 21:35

## 2020-06-19 RX ADMIN — QUETIAPINE FUMARATE 200 MILLIGRAM(S): 200 TABLET, FILM COATED ORAL at 21:11

## 2020-06-19 RX ADMIN — POLYETHYLENE GLYCOL 3350 17 GRAM(S): 17 POWDER, FOR SOLUTION ORAL at 09:35

## 2020-06-19 RX ADMIN — Medication 90 MILLIGRAM(S): at 05:02

## 2020-06-19 RX ADMIN — CHLORHEXIDINE GLUCONATE 15 MILLILITER(S): 213 SOLUTION TOPICAL at 09:48

## 2020-06-19 RX ADMIN — CHLORHEXIDINE GLUCONATE 15 MILLILITER(S): 213 SOLUTION TOPICAL at 21:12

## 2020-06-19 RX ADMIN — ALBUTEROL 2 PUFF(S): 90 AEROSOL, METERED ORAL at 17:10

## 2020-06-19 RX ADMIN — Medication 90 MILLIGRAM(S): at 17:31

## 2020-06-19 RX ADMIN — CLOPIDOGREL BISULFATE 75 MILLIGRAM(S): 75 TABLET, FILM COATED ORAL at 09:33

## 2020-06-19 RX ADMIN — Medication 100 MILLIGRAM(S): at 12:41

## 2020-06-19 RX ADMIN — GABAPENTIN 400 MILLIGRAM(S): 400 CAPSULE ORAL at 14:34

## 2020-06-19 RX ADMIN — ALBUTEROL 2 PUFF(S): 90 AEROSOL, METERED ORAL at 09:18

## 2020-06-19 RX ADMIN — GABAPENTIN 400 MILLIGRAM(S): 400 CAPSULE ORAL at 05:04

## 2020-06-19 RX ADMIN — FAMOTIDINE 20 MILLIGRAM(S): 10 INJECTION INTRAVENOUS at 09:33

## 2020-06-19 RX ADMIN — Medication 0.5 MILLIGRAM(S): at 19:22

## 2020-06-19 RX ADMIN — Medication 5 MILLIGRAM(S): at 21:11

## 2020-06-19 RX ADMIN — QUETIAPINE FUMARATE 50 MILLIGRAM(S): 200 TABLET, FILM COATED ORAL at 09:34

## 2020-06-19 RX ADMIN — Medication 90 MILLIGRAM(S): at 12:41

## 2020-06-19 RX ADMIN — ALBUTEROL 2 PUFF(S): 90 AEROSOL, METERED ORAL at 12:39

## 2020-06-19 RX ADMIN — ENOXAPARIN SODIUM 40 MILLIGRAM(S): 100 INJECTION SUBCUTANEOUS at 09:35

## 2020-06-19 RX ADMIN — ENOXAPARIN SODIUM 40 MILLIGRAM(S): 100 INJECTION SUBCUTANEOUS at 21:34

## 2020-06-19 RX ADMIN — Medication 0.5 MILLIGRAM(S): at 05:41

## 2020-06-19 RX ADMIN — Medication 25 MILLIGRAM(S): at 21:35

## 2020-06-19 RX ADMIN — Medication 0.5 MILLIGRAM(S): at 12:41

## 2020-06-19 RX ADMIN — SENNA PLUS 2 TABLET(S): 8.6 TABLET ORAL at 21:11

## 2020-06-19 NOTE — PROGRESS NOTE ADULT - ASSESSMENT
PROBLEMS;    Febrile illness- GNR in sputum-pseudomonas/proteus  s/p staph bactermia/sputum pseudomonas/staph  Ac on chronic hypercapnic & hypoxamic respiratory failure-s/p trach & PEG  sputum-Few Pseudomonas aeruginosa (Carbapenem Resistant)/Moderate Methicillin resistant Staphylococcus aureus  afib with RVR  OHS/VIJAY  AC flare of COPD/chronic vs acute on chronic bronchitis  Mild interstitial lung disease-NSIP h/o smoking  COVID negativex2  Pulmonary hypertension  Nonobstructing stone in the left ureterovesicular junction/ nonobstructing stone in the lower pole right kidney.  Subacute hemorrhage in the right rectus abdominis along the anterior sheath, measures 1.6 cm in thickness and extends from the umbilicus to the inferior myotendinous junction  Cirrhosis  Mild splenomegaly-portal venous hypertension.  Chronic afib w Watchman device  CHF  BPH  GERD  ETOH abuse  seizures disorder    PLAN;    pulmonary stable-trach capped-fio2 2lpm nc  iv rhocephin-decd  Tube feeding as tolerated  supportive care  covid repeat testing-neg  Eliquis/asa 81  d/w staff

## 2020-06-19 NOTE — PROGRESS NOTE ADULT - SUBJECTIVE AND OBJECTIVE BOX
[facility] # [id number]  ICU # [id number]  Vent # 22 and Trach and PEG # 34    CC:  Respiratory Failure     HPI:    66 y/o male w chronic AFib w Watchman device, HFpEF , COPD on home O2, HTN, ETOH use and pt of size admitted on 4/18 with COPD exacerbation--RRT called on 4/23 for AMS requiring intubation--extubated on 4/26 then re intubated on 4/29.  S/P Trach and PEG on 5/15 and has failed SBT.  Most recent active issues is persistent MSSA sepsis and CRP in sputum--on IV vanco and Cipro.  No central access      5/22:  Pt seen and examined in ICU--vented-- Encephalopathy.  still MSSA + in Bcx  5/23:  Tolerated PSV 12 x 3 hrs on 5/22.  SBT ongoing now.  Poor mentation.  Repeat BCx sent.  Abx changed to Ancef  5/24:  PSV X 4.5 hrs on 5/23.  Rapid AFib O/N.  Still Encephalopathy.  COVID sent.  5/23 BCx still P.     6/1:  Case d/w Dr horne.  Pt seen and examined in ICU--T+P--attempts to follow commands but overall Encephalopathic.  SBT ongoing.  Tm 99.6  6/2:  No restraints overnight.  Calm.  + BM yesterday.  On vent support.  Stable vitals.  No fever.  Stable for placement   6/3:  No restraints.  Slightly more awake and alert.  On Vent support.  Stable for placement.  Stable vitals and no fevers  6/4:  No restraints. No haldol.  Xanzx.  Awake and following commands and attempting to speak  6/5:  No events overnight.  Able to engage and follow simple commands.  No restraints.  Last day of Ancef  6/6:  No events overnight.  Tolerated SBT 5.5 hrs on 6/5.  Awake.  Follows commands.  Restless but but responsive  6/7:  No events overnight.  Awake and alert.  Lucid.  SBT ongoing     6/15:  Case d/w dr horne--seen and examined in ICU--on TC x several days.  Awake but still restless and pulling on tubes.  In RAFib   6/16:  HR improved with BBx.  New temp spike to 101.7 on 6/15--possible UTI and started on CTX--No fevers.  CXR (6/15)-- Personally reviewed--no infiltrates   6/17:  UCx negative.  No fevers.  Normal WBC.  Trach changed to 6 cuffless/fenestrated 6/16.  Restless pulling on lines/trach.  No BM X 3 days  6/18:  No fevers.  Capped.  In recliner.  Still Encephalopathic.  + BM  6/19:  Encephalopathic but less agitated--restraints off.  Tm 99.7      PMH:  As above.     PSH:  As above.     FH: Non Contributory other than those listed in HPI    Social History:  Unobtainable due to clinical condition     MEDICATIONS  (STANDING):  ALBUTerol    90 MICROgram(s) HFA Inhaler 2 Puff(s) Inhalation every 4 hours  ALPRAZolam 0.5 milliGRAM(s) Oral every 6 hours  aspirin  chewable 81 milliGRAM(s) Oral daily  budesonide 160 MICROgram(s)/formoterol 4.5 MICROgram(s) Inhaler 2 Puff(s) Inhalation two times a day  chlorhexidine 0.12% Liquid 15 milliLiter(s) Oral Mucosa every 12 hours  clopidogrel Tablet 75 milliGRAM(s) Oral daily  diltiazem    Tablet 90 milliGRAM(s) Oral every 6 hours  doxazosin 2 milliGRAM(s) Oral at bedtime  enoxaparin Injectable 40 milliGRAM(s) SubCutaneous every 12 hours  famotidine    Tablet 20 milliGRAM(s) Oral two times a day  gabapentin 400 milliGRAM(s) Oral three times a day  melatonin 5 milliGRAM(s) Oral at bedtime  metoprolol tartrate 25 milliGRAM(s) Oral two times a day  polyethylene glycol 3350 17 Gram(s) Oral daily  QUEtiapine 200 milliGRAM(s) Oral at bedtime  QUEtiapine 50 milliGRAM(s) Oral daily  senna 2 Tablet(s) Oral at bedtime  thiamine 100 milliGRAM(s) Oral daily  tiotropium 18 MICROgram(s) Capsule 1 Capsule(s) Inhalation daily    MEDICATIONS  (PRN):  acetaminophen   Tablet .. 650 milliGRAM(s) Oral every 6 hours PRN Temp greater or equal to 38.5C (101.3F)  ibuprofen  Tablet. 400 milliGRAM(s) Oral every 6 hours PRN Moderate Pain (4 - 6)      Allergies: NKDA    ROS:  SEE BELOW        ICU Vital Signs Last 24 Hrs  T(C): 36.8 (19 Jun 2020 09:00), Max: 37.6 (18 Jun 2020 16:00)  T(F): 98.2 (19 Jun 2020 09:00), Max: 99.7 (19 Jun 2020 00:00)  HR: 91 (19 Jun 2020 09:00) (73 - 126)  BP: 161/99 (19 Jun 2020 09:00) (88/63 - 161/99)  BP(mean): 111 (19 Jun 2020 09:00) (64 - 111)  ABP: --  ABP(mean): --  RR: 20 (19 Jun 2020 09:00) (19 - 32)  SpO2: 96% (19 Jun 2020 09:00) (72% - 100%)          I&O's Summary    18 Jun 2020 07:01  -  19 Jun 2020 07:00  --------------------------------------------------------  IN: 2062 mL / OUT: 0 mL / NET: 2062 mL        Physical Exam:  SEE BELOW                              DVT Prophylaxis:                                                            Contraindication:     Advanced Directives:    Discussed with:    Visit Information:  Time spent excluding procedure:      ** Time is exclusive of billed procedures and/or teaching and/or routine family updates.

## 2020-06-19 NOTE — PROGRESS NOTE ADULT - ASSESSMENT
IMP:    66 y/o male w chronic AFib w Watchman device, HFpEF , COPD on home O2, HTN, ETOH use and pt of size admitted on 4/18 with COPD exacerbation--RRT called on 4/23 for AMS requiring intubation--extubated on 4/26 then re intubated on 4/29.  S/P Trach and PEG on 5/15 and tolerating TC  MSSA sepsis and CRP in sputum--Completed IV Ancef (6/5) and completed Cipro.  No central access   Chronic resp failure  TM Encephalopathy and agitation--Not getting better-- ?? paradoxical reaction to Seroquel   Possible UTI sepsis wo indwelling cath--completed 3 days of CTX.  UCx negative but sent after starting Abx    Plan:    Cap trach--I would not consider decannulation given his hx and current pulmonary and neurologic status  Xanax to 0.5 q 6  Seroquel and Oxy (QTc < 450)--will decrease to 50 daily and cont to taper down--cont QHS dose  Cont clopidogrel and ASA for CAD and AFib  Cont Dilt 90 q6  Added BBx 25 q 12   TF to goal  HOB > 30  Started cardura   DVT prophy--SC and LMWH  No need for daily labs    ICU care--d/w ICU staff on multi disciplinary rounds

## 2020-06-19 NOTE — PROGRESS NOTE ADULT - SUBJECTIVE AND OBJECTIVE BOX
Subjective:  Patient tolerating capping of tracheostomy.  No acute events overnight.    Vital Signs:  Vital Signs Last 24 Hrs  T(C): 36.8 (06-19-20 @ 09:00), Max: 37.6 (06-18-20 @ 16:00)  T(F): 98.2 (06-19-20 @ 09:00), Max: 99.7 (06-19-20 @ 00:00)  HR: 91 (06-19-20 @ 10:00) (73 - 100)  BP: 113/74 (06-19-20 @ 10:00) (88/63 - 161/99)  RR: 13 (06-19-20 @ 10:00) (13 - 32)  SpO2: 88% (06-19-20 @ 10:00) (72% - 99%) on room air    Telemetry/Alarms: atrial fibrillation    Relevant labs, radiology and Medications reviewed    MEDICATIONS  (STANDING):  ALBUTerol    90 MICROgram(s) HFA Inhaler 2 Puff(s) Inhalation every 4 hours  ALPRAZolam 0.5 milliGRAM(s) Oral every 6 hours  aspirin  chewable 81 milliGRAM(s) Oral daily  budesonide 160 MICROgram(s)/formoterol 4.5 MICROgram(s) Inhaler 2 Puff(s) Inhalation two times a day  chlorhexidine 0.12% Liquid 15 milliLiter(s) Oral Mucosa every 12 hours  clopidogrel Tablet 75 milliGRAM(s) Oral daily  diltiazem    Tablet 90 milliGRAM(s) Oral every 6 hours  doxazosin 2 milliGRAM(s) Oral at bedtime  enoxaparin Injectable 40 milliGRAM(s) SubCutaneous every 12 hours  famotidine    Tablet 20 milliGRAM(s) Oral two times a day  gabapentin 400 milliGRAM(s) Oral three times a day  melatonin 5 milliGRAM(s) Oral at bedtime  metoprolol tartrate 25 milliGRAM(s) Oral two times a day  polyethylene glycol 3350 17 Gram(s) Oral daily  QUEtiapine 200 milliGRAM(s) Oral at bedtime  QUEtiapine 50 milliGRAM(s) Oral daily  senna 2 Tablet(s) Oral at bedtime  thiamine 100 milliGRAM(s) Oral daily  tiotropium 18 MICROgram(s) Capsule 1 Capsule(s) Inhalation daily    MEDICATIONS  (PRN):  acetaminophen   Tablet .. 650 milliGRAM(s) Oral every 6 hours PRN Temp greater or equal to 38.5C (101.3F)  ibuprofen  Tablet. 400 milliGRAM(s) Oral every 6 hours PRN Moderate Pain (4 - 6)      Physical exam  Gen: NAD resting comfortably  Neuro: AO x 3  Card: S1 S2  Pulm: equal bilaterally  Abd: PEG in place  Ext: no edema    I&O's Summary    18 Jun 2020 07:01  -  19 Jun 2020 07:00  --------------------------------------------------------  IN: 2062 mL / OUT: 0 mL / NET: 2062 mL        Assessment  65y Male  w/ PAST MEDICAL & SURGICAL HISTORY:  Chronic CHF  COPD without exacerbation  Atrial fibrillation  Presence of Watchman left atrial appendage closure device  Hypertension  GERD (gastroesophageal reflux disease)  Chronic obstructive pulmonary disease (COPD)  Anemia  Falls  Meningitis  Collapsed lung  Alcohol withdrawal  Emphysema of lung  Cirrhosis  CHF (congestive heart failure)  Poor historian  Alcohol abuse  Chronic atrial fibrillation  Unilateral amputation of lower extremity below knee  Presence of Watchman left atrial appendage closure device  S/P BKA (below knee amputation) unilateral, left      Patient is a 65y old  Male who presents with a chief complaint of acute hypoxemic, hypercapneic resp failure  COPD exacerbation (19 Jun 2020 09:08)  .  1.  Chronic respiratory failure  2.  COPD exacerbation  3.  Pneumonia  4.  Leukocytosis and fevers    Continue cap.  Continue nasal canula.  Would not decannulate this hospital admission.  May follow up as outpatient with Dr. Morris for trach removal.  Discharge planning.    Discussed with Cardiothoracic Team at AM rounds.

## 2020-06-19 NOTE — PROGRESS NOTE ADULT - SUBJECTIVE AND OBJECTIVE BOX
Subjective:    pat no distress with trach with button.    Home Medications:  acetaminophen 325 mg oral tablet: 2 tab(s) orally every 6 hours, As needed, Temp greater or equal to 38.5C (101.3F) (02 Jun 2020 12:53)  albuterol 90 mcg/inh inhalation aerosol: 2 puff(s) inhaled every 4 hours (02 Jun 2020 12:53)  ALPRAZolam 0.5 mg oral tablet: 1 tab(s) orally every 6 hours (02 Jun 2020 12:53)  aspirin 81 mg oral tablet, chewable: 1 tab(s) orally once a day (02 Jun 2020 12:53)  clopidogrel 75 mg oral tablet: 1 tab(s) orally once a day (02 Jun 2020 12:53)  dilTIAZem 90 mg oral tablet: 1 tab(s) orally every 6 hours (02 Jun 2020 12:53)  doxazosin 2 mg oral tablet: 1 tab(s) orally once a day (at bedtime) (02 Jun 2020 12:53)  gabapentin 400 mg oral capsule: 1 cap(s) orally 3 times a day (02 Jun 2020 12:53)  pantoprazole 40 mg oral granule, enteric coated: 40 milligram(s) orally once a day (19 Apr 2020 03:12)  QUEtiapine 100 mg oral tablet: 1 tab(s) orally once a day (at bedtime) (02 Jun 2020 12:53)  QUEtiapine 50 mg oral tablet: 1 tab(s) orally once a day (02 Jun 2020 12:53)  Spiriva 18 mcg inhalation capsule: 1 cap(s) inhaled once a day (19 Apr 2020 03:12)  thiamine 100 mg oral tablet: 1 tab(s) orally once a day (02 Jun 2020 12:53)    MEDICATIONS  (STANDING):  ALBUTerol    90 MICROgram(s) HFA Inhaler 2 Puff(s) Inhalation every 4 hours  ALPRAZolam 0.5 milliGRAM(s) Oral every 6 hours  aspirin  chewable 81 milliGRAM(s) Oral daily  budesonide 160 MICROgram(s)/formoterol 4.5 MICROgram(s) Inhaler 2 Puff(s) Inhalation two times a day  chlorhexidine 0.12% Liquid 15 milliLiter(s) Oral Mucosa every 12 hours  clopidogrel Tablet 75 milliGRAM(s) Oral daily  diltiazem    Tablet 90 milliGRAM(s) Oral every 6 hours  doxazosin 2 milliGRAM(s) Oral at bedtime  enoxaparin Injectable 40 milliGRAM(s) SubCutaneous every 12 hours  famotidine    Tablet 20 milliGRAM(s) Oral two times a day  gabapentin 400 milliGRAM(s) Oral three times a day  melatonin 5 milliGRAM(s) Oral at bedtime  metoprolol tartrate 25 milliGRAM(s) Oral two times a day  polyethylene glycol 3350 17 Gram(s) Oral daily  QUEtiapine 200 milliGRAM(s) Oral at bedtime  QUEtiapine 50 milliGRAM(s) Oral daily  senna 2 Tablet(s) Oral at bedtime  thiamine 100 milliGRAM(s) Oral daily  tiotropium 18 MICROgram(s) Capsule 1 Capsule(s) Inhalation daily    MEDICATIONS  (PRN):  acetaminophen   Tablet .. 650 milliGRAM(s) Oral every 6 hours PRN Temp greater or equal to 38.5C (101.3F)  ibuprofen  Tablet. 400 milliGRAM(s) Oral every 6 hours PRN Moderate Pain (4 - 6)      Allergies    Ceclor (Rash)  Duricef (Rash)    Intolerances        Vital Signs Last 24 Hrs  T(C): 36.8 (19 Jun 2020 09:00), Max: 37.6 (18 Jun 2020 16:00)  T(F): 98.2 (19 Jun 2020 09:00), Max: 99.7 (19 Jun 2020 00:00)  HR: 86 (19 Jun 2020 13:00) (72 - 100)  BP: 153/82 (19 Jun 2020 13:00) (88/63 - 170/89)  BP(mean): 96 (19 Jun 2020 13:00) (69 - 111)  RR: 31 (19 Jun 2020 13:00) (13 - 32)  SpO2: 100% (19 Jun 2020 13:00) (72% - 100%)      PHYSICAL EXAMINATION:    NECK:  Supple. No lymphadenopathy. Jugular venous pressure not elevated. Carotids equal.   HEART:   The cardiac impulse has a normal quality. Reg., Nl S1 and S2.  There are no murmurs, rubs or gallops noted  CHEST:  Chest is clear to auscultation. Normal respiratory effort.  ABDOMEN:  Soft and nontender.   EXTREMITIES:  There is no edema.       LABS:

## 2020-06-20 PROCEDURE — 99232 SBSQ HOSP IP/OBS MODERATE 35: CPT

## 2020-06-20 RX ORDER — ALPRAZOLAM 0.25 MG
0.5 TABLET ORAL EVERY 6 HOURS
Refills: 0 | Status: DISCONTINUED | OUTPATIENT
Start: 2020-06-20 | End: 2020-06-27

## 2020-06-20 RX ORDER — QUETIAPINE FUMARATE 200 MG/1
25 TABLET, FILM COATED ORAL DAILY
Refills: 0 | Status: DISCONTINUED | OUTPATIENT
Start: 2020-06-20 | End: 2020-06-21

## 2020-06-20 RX ADMIN — Medication 90 MILLIGRAM(S): at 05:39

## 2020-06-20 RX ADMIN — Medication 90 MILLIGRAM(S): at 11:20

## 2020-06-20 RX ADMIN — SENNA PLUS 2 TABLET(S): 8.6 TABLET ORAL at 21:18

## 2020-06-20 RX ADMIN — Medication 90 MILLIGRAM(S): at 18:32

## 2020-06-20 RX ADMIN — POLYETHYLENE GLYCOL 3350 17 GRAM(S): 17 POWDER, FOR SOLUTION ORAL at 11:20

## 2020-06-20 RX ADMIN — Medication 0.5 MILLIGRAM(S): at 05:39

## 2020-06-20 RX ADMIN — ENOXAPARIN SODIUM 40 MILLIGRAM(S): 100 INJECTION SUBCUTANEOUS at 11:20

## 2020-06-20 RX ADMIN — Medication 81 MILLIGRAM(S): at 11:20

## 2020-06-20 RX ADMIN — QUETIAPINE FUMARATE 25 MILLIGRAM(S): 200 TABLET, FILM COATED ORAL at 11:20

## 2020-06-20 RX ADMIN — Medication 2 MILLIGRAM(S): at 21:18

## 2020-06-20 RX ADMIN — BUDESONIDE AND FORMOTEROL FUMARATE DIHYDRATE 2 PUFF(S): 160; 4.5 AEROSOL RESPIRATORY (INHALATION) at 20:04

## 2020-06-20 RX ADMIN — Medication 90 MILLIGRAM(S): at 00:26

## 2020-06-20 RX ADMIN — ENOXAPARIN SODIUM 40 MILLIGRAM(S): 100 INJECTION SUBCUTANEOUS at 21:19

## 2020-06-20 RX ADMIN — Medication 0.5 MILLIGRAM(S): at 18:32

## 2020-06-20 RX ADMIN — Medication 25 MILLIGRAM(S): at 21:18

## 2020-06-20 RX ADMIN — FAMOTIDINE 20 MILLIGRAM(S): 10 INJECTION INTRAVENOUS at 21:18

## 2020-06-20 RX ADMIN — Medication 0.5 MILLIGRAM(S): at 23:06

## 2020-06-20 RX ADMIN — Medication 0.5 MILLIGRAM(S): at 00:26

## 2020-06-20 RX ADMIN — Medication 5 MILLIGRAM(S): at 21:18

## 2020-06-20 RX ADMIN — Medication 90 MILLIGRAM(S): at 23:06

## 2020-06-20 RX ADMIN — CHLORHEXIDINE GLUCONATE 15 MILLILITER(S): 213 SOLUTION TOPICAL at 11:20

## 2020-06-20 RX ADMIN — GABAPENTIN 400 MILLIGRAM(S): 400 CAPSULE ORAL at 13:32

## 2020-06-20 RX ADMIN — QUETIAPINE FUMARATE 200 MILLIGRAM(S): 200 TABLET, FILM COATED ORAL at 21:19

## 2020-06-20 RX ADMIN — Medication 100 MILLIGRAM(S): at 11:20

## 2020-06-20 RX ADMIN — CLOPIDOGREL BISULFATE 75 MILLIGRAM(S): 75 TABLET, FILM COATED ORAL at 11:20

## 2020-06-20 RX ADMIN — GABAPENTIN 400 MILLIGRAM(S): 400 CAPSULE ORAL at 05:39

## 2020-06-20 RX ADMIN — Medication 25 MILLIGRAM(S): at 11:20

## 2020-06-20 RX ADMIN — GABAPENTIN 400 MILLIGRAM(S): 400 CAPSULE ORAL at 21:18

## 2020-06-20 RX ADMIN — ALBUTEROL 2 PUFF(S): 90 AEROSOL, METERED ORAL at 20:07

## 2020-06-20 RX ADMIN — FAMOTIDINE 20 MILLIGRAM(S): 10 INJECTION INTRAVENOUS at 11:19

## 2020-06-20 RX ADMIN — Medication 0.5 MILLIGRAM(S): at 11:19

## 2020-06-20 NOTE — PROGRESS NOTE ADULT - SUBJECTIVE AND OBJECTIVE BOX
[facility] # 62  ICU # 57  Vent # 22 and Trach and PEG # 35    CC:  Respiratory Failure     HPI:    66 y/o male w chronic AFib w Watchman device, HFpEF , COPD on home O2, HTN, ETOH use and pt of size admitted on 4/18 with COPD exacerbation--RRT called on 4/23 for AMS requiring intubation--extubated on 4/26 then re intubated on 4/29.  S/P Trach and PEG on 5/15 and has failed SBT.  Most recent active issues is persistent MSSA sepsis and CRP in sputum--on IV vanco and Cipro.  No central access      5/22:  Pt seen and examined in ICU--vented-- Encephalopathy.  still MSSA + in Bcx  5/23:  Tolerated PSV 12 x 3 hrs on 5/22.  SBT ongoing now.  Poor mentation.  Repeat BCx sent.  Abx changed to Ancef  5/24:  PSV X 4.5 hrs on 5/23.  Rapid AFib O/N.  Still Encephalopathy.  COVID sent.  5/23 BCx still P.     6/1:  Case d/w Dr horne.  Pt seen and examined in ICU--T+P--attempts to follow commands but overall Encephalopathic.  SBT ongoing.  Tm 99.6  6/2:  No restraints overnight.  Calm.  + BM yesterday.  On vent support.  Stable vitals.  No fever.  Stable for placement   6/3:  No restraints.  Slightly more awake and alert.  On Vent support.  Stable for placement.  Stable vitals and no fevers  6/4:  No restraints. No haldol.  Xanzx.  Awake and following commands and attempting to speak  6/5:  No events overnight.  Able to engage and follow simple commands.  No restraints.  Last day of Ancef  6/6:  No events overnight.  Tolerated SBT 5.5 hrs on 6/5.  Awake.  Follows commands.  Restless but but responsive  6/7:  No events overnight.  Awake and alert.  Lucid.  SBT ongoing     6/15:  Case d/w dr horne--seen and examined in ICU--on TC x several days.  Awake but still restless and pulling on tubes.  In RAFib   6/16:  HR improved with BBx.  New temp spike to 101.7 on 6/15--possible UTI and started on CTX--No fevers.  CXR (6/15)-- Personally reviewed--no infiltrates   6/17:  UCx negative.  No fevers.  Normal WBC.  Trach changed to 6 cuffless/fenestrated 6/16.  Restless pulling on lines/trach.  No BM X 3 days  6/18:  No fevers.  Capped.  In recliner.  Still Encephalopathic.  + BM  6/19:  Encephalopathic but less agitated--restraints off.  Tm 99.7   6/20:  Sleeping all day yesterday--no fever--ABG OK.  Will cont to wean off day time seroquel.  Restless but is able to vebalize     PMH:  As above.     PSH:  As above.     FH: Non Contributory other than those listed in HPI    Social History:  Unobtainable due to clinical condition     MEDICATIONS  (STANDING):  ALBUTerol    90 MICROgram(s) HFA Inhaler 2 Puff(s) Inhalation every 4 hours  ALPRAZolam 0.5 milliGRAM(s) Oral every 6 hours  aspirin  chewable 81 milliGRAM(s) Oral daily  budesonide 160 MICROgram(s)/formoterol 4.5 MICROgram(s) Inhaler 2 Puff(s) Inhalation two times a day  chlorhexidine 0.12% Liquid 15 milliLiter(s) Oral Mucosa every 12 hours  clopidogrel Tablet 75 milliGRAM(s) Oral daily  diltiazem    Tablet 90 milliGRAM(s) Oral every 6 hours  doxazosin 2 milliGRAM(s) Oral at bedtime  enoxaparin Injectable 40 milliGRAM(s) SubCutaneous every 12 hours  famotidine    Tablet 20 milliGRAM(s) Oral two times a day  gabapentin 400 milliGRAM(s) Oral three times a day  melatonin 5 milliGRAM(s) Oral at bedtime  metoprolol tartrate 25 milliGRAM(s) Oral two times a day  polyethylene glycol 3350 17 Gram(s) Oral daily  QUEtiapine 25 milliGRAM(s) Oral daily  QUEtiapine 200 milliGRAM(s) Oral at bedtime  senna 2 Tablet(s) Oral at bedtime  thiamine 100 milliGRAM(s) Oral daily  tiotropium 18 MICROgram(s) Capsule 1 Capsule(s) Inhalation daily    MEDICATIONS  (PRN):  acetaminophen   Tablet .. 650 milliGRAM(s) Oral every 6 hours PRN Temp greater or equal to 38.5C (101.3F)  ibuprofen  Tablet. 400 milliGRAM(s) Oral every 6 hours PRN Moderate Pain (4 - 6)      Allergies: NKDA    ROS:  SEE BELOW        ICU Vital Signs Last 24 Hrs  T(C): 37 (20 Jun 2020 04:00), Max: 37.3 (19 Jun 2020 13:00)  T(F): 98.6 (20 Jun 2020 04:00), Max: 99.1 (19 Jun 2020 13:00)  HR: 104 (20 Jun 2020 07:00) (69 - 121)  BP: 130/87 (20 Jun 2020 07:00) (106/71 - 170/89)  BP(mean): 97 (20 Jun 2020 07:00) (74 - 111)  ABP: --  ABP(mean): --  RR: 28 (20 Jun 2020 07:00) (13 - 41)  SpO2: 99% (20 Jun 2020 07:00) (88% - 100%)          I&O's Summary    19 Jun 2020 07:01  -  20 Jun 2020 07:00  --------------------------------------------------------  IN: 2300 mL / OUT: 0 mL / NET: 2300 mL        Physical Exam:  SEE BELOW                      ABG - ( 19 Jun 2020 16:25 )  pH, Arterial: 7.42  pH, Blood: x     /  pCO2: 46    /  pO2: 133   / HCO3: 29    / Base Excess: 4.0   /  SaO2: 99                      DVT Prophylaxis:                                                            Contraindication:     Advanced Directives:    Discussed with:    Visit Information:  Time spent excluding procedure:      ** Time is exclusive of billed procedures and/or teaching and/or routine family updates.

## 2020-06-20 NOTE — PROGRESS NOTE ADULT - ASSESSMENT
IMP:    64 y/o male w chronic AFib w Watchman device, HFpEF , COPD on home O2, HTN, ETOH use and pt of size admitted on 4/18 with COPD exacerbation--RRT called on 4/23 for AMS requiring intubation--extubated on 4/26 then re intubated on 4/29.  S/P Trach and PEG on 5/15 and tolerating TC  MSSA sepsis and CRP in sputum--Completed IV Ancef (6/5) and completed Cipro.  No central access   Chronic resp failure  TM Encephalopathy and agitation--Slightly better-- ?? paradoxical reaction to Seroquel   Possible UTI sepsis wo indwelling cath--completed 3 days of CTX.  UCx negative but sent after starting Abx    Plan:    Cap trach--I would not consider decannulation given his hx and current pulmonary and neurologic status  Xanax to 0.5 q 6  Seroquel and Oxy (QTc < 450)--will decrease to 25 daily and cont to taper down--cont QHS dose  Cont clopidogrel and ASA for CAD and AFib  Cont Dilt 90 q6  Added BBx 25 q 12   TF to goal  HOB > 30  Started cardura   DVT prophy--SC and LMWH  No need for daily labs    ICU care--d/w ICU staff on multi disciplinary rounds

## 2020-06-20 NOTE — PROGRESS NOTE ADULT - SUBJECTIVE AND OBJECTIVE BOX
Subjective:    pat no respiratory distress, lying in bed, on %, with trach capped.    Home Medications:  acetaminophen 325 mg oral tablet: 2 tab(s) orally every 6 hours, As needed, Temp greater or equal to 38.5C (101.3F) (02 Jun 2020 12:53)  albuterol 90 mcg/inh inhalation aerosol: 2 puff(s) inhaled every 4 hours (02 Jun 2020 12:53)  ALPRAZolam 0.5 mg oral tablet: 1 tab(s) orally every 6 hours (02 Jun 2020 12:53)  aspirin 81 mg oral tablet, chewable: 1 tab(s) orally once a day (02 Jun 2020 12:53)  clopidogrel 75 mg oral tablet: 1 tab(s) orally once a day (02 Jun 2020 12:53)  dilTIAZem 90 mg oral tablet: 1 tab(s) orally every 6 hours (02 Jun 2020 12:53)  doxazosin 2 mg oral tablet: 1 tab(s) orally once a day (at bedtime) (02 Jun 2020 12:53)  gabapentin 400 mg oral capsule: 1 cap(s) orally 3 times a day (02 Jun 2020 12:53)  pantoprazole 40 mg oral granule, enteric coated: 40 milligram(s) orally once a day (19 Apr 2020 03:12)  QUEtiapine 100 mg oral tablet: 1 tab(s) orally once a day (at bedtime) (02 Jun 2020 12:53)  QUEtiapine 50 mg oral tablet: 1 tab(s) orally once a day (02 Jun 2020 12:53)  Spiriva 18 mcg inhalation capsule: 1 cap(s) inhaled once a day (19 Apr 2020 03:12)  thiamine 100 mg oral tablet: 1 tab(s) orally once a day (02 Jun 2020 12:53)    MEDICATIONS  (STANDING):  ALBUTerol    90 MICROgram(s) HFA Inhaler 2 Puff(s) Inhalation every 4 hours  ALPRAZolam 0.5 milliGRAM(s) Oral every 6 hours  aspirin  chewable 81 milliGRAM(s) Oral daily  budesonide 160 MICROgram(s)/formoterol 4.5 MICROgram(s) Inhaler 2 Puff(s) Inhalation two times a day  chlorhexidine 0.12% Liquid 15 milliLiter(s) Oral Mucosa every 12 hours  clopidogrel Tablet 75 milliGRAM(s) Oral daily  diltiazem    Tablet 90 milliGRAM(s) Oral every 6 hours  doxazosin 2 milliGRAM(s) Oral at bedtime  enoxaparin Injectable 40 milliGRAM(s) SubCutaneous every 12 hours  famotidine    Tablet 20 milliGRAM(s) Oral two times a day  gabapentin 400 milliGRAM(s) Oral three times a day  melatonin 5 milliGRAM(s) Oral at bedtime  metoprolol tartrate 25 milliGRAM(s) Oral two times a day  polyethylene glycol 3350 17 Gram(s) Oral daily  QUEtiapine 25 milliGRAM(s) Oral daily  QUEtiapine 200 milliGRAM(s) Oral at bedtime  senna 2 Tablet(s) Oral at bedtime  thiamine 100 milliGRAM(s) Oral daily  tiotropium 18 MICROgram(s) Capsule 1 Capsule(s) Inhalation daily    MEDICATIONS  (PRN):  acetaminophen   Tablet .. 650 milliGRAM(s) Oral every 6 hours PRN Temp greater or equal to 38.5C (101.3F)  ibuprofen  Tablet. 400 milliGRAM(s) Oral every 6 hours PRN Moderate Pain (4 - 6)      Allergies    Ceclor (Rash)  Duricef (Rash)    Intolerances        Vital Signs Last 24 Hrs  T(C): 37 (20 Jun 2020 04:00), Max: 37.3 (19 Jun 2020 13:00)  T(F): 98.6 (20 Jun 2020 04:00), Max: 99.1 (19 Jun 2020 13:00)  HR: 107 (20 Jun 2020 11:00) (69 - 121)  BP: 140/70 (20 Jun 2020 11:00) (106/71 - 170/89)  BP(mean): 85 (20 Jun 2020 11:00) (73 - 104)  RR: 30 (20 Jun 2020 11:00) (19 - 41)  SpO2: 100% (20 Jun 2020 11:00) (90% - 100%)      PHYSICAL EXAMINATION:    NECK:  Supple. No lymphadenopathy. Jugular venous pressure not elevated. Carotids equal.   HEART:   The cardiac impulse has a normal quality. Reg., Nl S1 and S2.  There are no murmurs, rubs or gallops noted  CHEST:  Chest crackles to auscultation. Normal respiratory effort.  ABDOMEN:  Soft and nontender.   EXTREMITIES:  There is no edema.       LABS:

## 2020-06-21 PROCEDURE — 99232 SBSQ HOSP IP/OBS MODERATE 35: CPT

## 2020-06-21 RX ORDER — QUETIAPINE FUMARATE 200 MG/1
150 TABLET, FILM COATED ORAL AT BEDTIME
Refills: 0 | Status: DISCONTINUED | OUTPATIENT
Start: 2020-06-21 | End: 2020-06-25

## 2020-06-21 RX ADMIN — Medication 0.5 MILLIGRAM(S): at 12:13

## 2020-06-21 RX ADMIN — FAMOTIDINE 20 MILLIGRAM(S): 10 INJECTION INTRAVENOUS at 09:32

## 2020-06-21 RX ADMIN — ENOXAPARIN SODIUM 40 MILLIGRAM(S): 100 INJECTION SUBCUTANEOUS at 09:32

## 2020-06-21 RX ADMIN — Medication 0.5 MILLIGRAM(S): at 17:18

## 2020-06-21 RX ADMIN — Medication 90 MILLIGRAM(S): at 23:20

## 2020-06-21 RX ADMIN — Medication 0.5 MILLIGRAM(S): at 06:15

## 2020-06-21 RX ADMIN — Medication 25 MILLIGRAM(S): at 21:55

## 2020-06-21 RX ADMIN — CLOPIDOGREL BISULFATE 75 MILLIGRAM(S): 75 TABLET, FILM COATED ORAL at 09:31

## 2020-06-21 RX ADMIN — QUETIAPINE FUMARATE 150 MILLIGRAM(S): 200 TABLET, FILM COATED ORAL at 22:54

## 2020-06-21 RX ADMIN — Medication 90 MILLIGRAM(S): at 06:16

## 2020-06-21 RX ADMIN — Medication 90 MILLIGRAM(S): at 12:13

## 2020-06-21 RX ADMIN — Medication 100 MILLIGRAM(S): at 09:32

## 2020-06-21 RX ADMIN — Medication 5 MILLIGRAM(S): at 21:55

## 2020-06-21 RX ADMIN — FAMOTIDINE 20 MILLIGRAM(S): 10 INJECTION INTRAVENOUS at 21:54

## 2020-06-21 RX ADMIN — GABAPENTIN 400 MILLIGRAM(S): 400 CAPSULE ORAL at 21:55

## 2020-06-21 RX ADMIN — Medication 2 MILLIGRAM(S): at 21:59

## 2020-06-21 RX ADMIN — ENOXAPARIN SODIUM 40 MILLIGRAM(S): 100 INJECTION SUBCUTANEOUS at 21:55

## 2020-06-21 RX ADMIN — Medication 90 MILLIGRAM(S): at 17:17

## 2020-06-21 RX ADMIN — GABAPENTIN 400 MILLIGRAM(S): 400 CAPSULE ORAL at 14:26

## 2020-06-21 RX ADMIN — Medication 0.5 MILLIGRAM(S): at 23:18

## 2020-06-21 RX ADMIN — Medication 81 MILLIGRAM(S): at 09:31

## 2020-06-21 RX ADMIN — GABAPENTIN 400 MILLIGRAM(S): 400 CAPSULE ORAL at 06:16

## 2020-06-21 NOTE — PROGRESS NOTE ADULT - ASSESSMENT
IMP:    64 y/o male w PAFib w Watchman device, HFpEF , COPD on home O2, HTN, ETOH use and pt of size admitted on 4/18 with COPD exacerbation--RRT called on 4/23 for AMS requiring intubation--extubated on 4/26 then re intubated on 4/29.  S/P Trach and PEG on 5/15 and tolerating TC  MSSA sepsis and CRP in sputum--Completed IV Ancef (6/5) and completed Cipro.  No central access   Chronic resp failure  TM Encephalopathy and agitation--much better-- ?? paradoxical reaction to Seroquel which has been weaned off (day dose)  Possible UTI sepsis WO indwelling cath--completed 3 days of CTX.  UCx negative but sent after starting Abx    Plan:    Cap trach--I would not consider decannulation given his hx and current pulmonary and neurologic status  Xanax to 0.5 q 6  Seroquel and Oxy (QTc < 450)--will DC daily dose today and just cont QHS dose--Would target 100mg QHS (presently on 200mg)  Cont clopidogrel and ASA for CAD and AFib  Cont Dilt 90 q6  Added BBx 25 q 12   TF to goal  HOB > 30  Started cardura   DVT prophy--SC and LMWH  Check labs in AM    ICU care--d/w ICU staff on multi disciplinary rounds

## 2020-06-21 NOTE — PROGRESS NOTE ADULT - NEUROLOGICAL DETAILS
Awake
Encephalopathic
Encephalopathy
Grossly non focal
Grossly non focal.  Encephalopathy
Grossly non focal
Grossly non focal. does not follow commands
Sedated

## 2020-06-21 NOTE — PROGRESS NOTE ADULT - SUBJECTIVE AND OBJECTIVE BOX
Subjective:    pat sitting in chair, with trach capped, no new complaint.    Home Medications:  acetaminophen 325 mg oral tablet: 2 tab(s) orally every 6 hours, As needed, Temp greater or equal to 38.5C (101.3F) (02 Jun 2020 12:53)  albuterol 90 mcg/inh inhalation aerosol: 2 puff(s) inhaled every 4 hours (02 Jun 2020 12:53)  ALPRAZolam 0.5 mg oral tablet: 1 tab(s) orally every 6 hours (02 Jun 2020 12:53)  aspirin 81 mg oral tablet, chewable: 1 tab(s) orally once a day (02 Jun 2020 12:53)  clopidogrel 75 mg oral tablet: 1 tab(s) orally once a day (02 Jun 2020 12:53)  dilTIAZem 90 mg oral tablet: 1 tab(s) orally every 6 hours (02 Jun 2020 12:53)  doxazosin 2 mg oral tablet: 1 tab(s) orally once a day (at bedtime) (02 Jun 2020 12:53)  gabapentin 400 mg oral capsule: 1 cap(s) orally 3 times a day (02 Jun 2020 12:53)  pantoprazole 40 mg oral granule, enteric coated: 40 milligram(s) orally once a day (19 Apr 2020 03:12)  QUEtiapine 100 mg oral tablet: 1 tab(s) orally once a day (at bedtime) (02 Jun 2020 12:53)  QUEtiapine 50 mg oral tablet: 1 tab(s) orally once a day (02 Jun 2020 12:53)  Spiriva 18 mcg inhalation capsule: 1 cap(s) inhaled once a day (19 Apr 2020 03:12)  thiamine 100 mg oral tablet: 1 tab(s) orally once a day (02 Jun 2020 12:53)    MEDICATIONS  (STANDING):  ALBUTerol    90 MICROgram(s) HFA Inhaler 2 Puff(s) Inhalation every 4 hours  ALPRAZolam 0.5 milliGRAM(s) Oral every 6 hours  aspirin  chewable 81 milliGRAM(s) Oral daily  budesonide 160 MICROgram(s)/formoterol 4.5 MICROgram(s) Inhaler 2 Puff(s) Inhalation two times a day  clopidogrel Tablet 75 milliGRAM(s) Oral daily  diltiazem    Tablet 90 milliGRAM(s) Oral every 6 hours  doxazosin 2 milliGRAM(s) Oral at bedtime  enoxaparin Injectable 40 milliGRAM(s) SubCutaneous every 12 hours  famotidine    Tablet 20 milliGRAM(s) Oral two times a day  gabapentin 400 milliGRAM(s) Oral three times a day  melatonin 5 milliGRAM(s) Oral at bedtime  metoprolol tartrate 25 milliGRAM(s) Oral two times a day  polyethylene glycol 3350 17 Gram(s) Oral daily  QUEtiapine 200 milliGRAM(s) Oral at bedtime  senna 2 Tablet(s) Oral at bedtime  thiamine 100 milliGRAM(s) Oral daily  tiotropium 18 MICROgram(s) Capsule 1 Capsule(s) Inhalation daily    MEDICATIONS  (PRN):  acetaminophen   Tablet .. 650 milliGRAM(s) Oral every 6 hours PRN Temp greater or equal to 38.5C (101.3F)  ibuprofen  Tablet. 400 milliGRAM(s) Oral every 6 hours PRN Moderate Pain (4 - 6)      Allergies    Ceclor (Rash)  Duricef (Rash)    Intolerances        Vital Signs Last 24 Hrs  T(C): 37 (21 Jun 2020 12:00), Max: 37.3 (21 Jun 2020 00:00)  T(F): 98.6 (21 Jun 2020 12:00), Max: 99.2 (21 Jun 2020 00:00)  HR: 99 (21 Jun 2020 12:00) (72 - 102)  BP: 113/96 (21 Jun 2020 12:00) (93/66 - 164/131)  BP(mean): 99 (21 Jun 2020 12:00) (64 - 138)  RR: 23 (21 Jun 2020 12:00) (11 - 31)  SpO2: 96% (21 Jun 2020 12:00) (92% - 100%)      PHYSICAL EXAMINATION:    NECK:  Supple. No lymphadenopathy. Jugular venous pressure not elevated. Carotids equal.   HEART:   The cardiac impulse has a normal quality. Reg., Nl S1 and S2.  There are no murmurs, rubs or gallops noted  CHEST:  Chest is clear to auscultation. Normal respiratory effort.  ABDOMEN:  Soft and nontender.   EXTREMITIES:  There is no edema.       LABS:

## 2020-06-21 NOTE — PROGRESS NOTE ADULT - GIT ABD PE PAL DETAILS PC
+ PEG

## 2020-06-21 NOTE — PROGRESS NOTE ADULT - SUBJECTIVE AND OBJECTIVE BOX
[facility] # 63  ICU # 58  Vent # 22 and Trach and PEG # 36    CC:  Respiratory Failure     HPI:    66 y/o male w chronic AFib w Watchman device, HFpEF , COPD on home O2, HTN, ETOH use and pt of size admitted on 4/18 with COPD exacerbation--RRT called on 4/23 for AMS requiring intubation--extubated on 4/26 then re intubated on 4/29.  S/P Trach and PEG on 5/15 and has failed SBT.  Most recent active issues is persistent MSSA sepsis and CRP in sputum--on IV vanco and Cipro.  No central access      5/22:  Pt seen and examined in ICU--vented-- Encephalopathy.  still MSSA + in Bcx  5/23:  Tolerated PSV 12 x 3 hrs on 5/22.  SBT ongoing now.  Poor mentation.  Repeat BCx sent.  Abx changed to Ancef  5/24:  PSV X 4.5 hrs on 5/23.  Rapid AFib O/N.  Still Encephalopathy.  COVID sent.  5/23 BCx still P.     6/1:  Case d/w Dr horne.  Pt seen and examined in ICU--T+P--attempts to follow commands but overall Encephalopathic.  SBT ongoing.  Tm 99.6  6/2:  No restraints overnight.  Calm.  + BM yesterday.  On vent support.  Stable vitals.  No fever.  Stable for placement   6/3:  No restraints.  Slightly more awake and alert.  On Vent support.  Stable for placement.  Stable vitals and no fevers  6/4:  No restraints. No haldol.  Xanzx.  Awake and following commands and attempting to speak  6/5:  No events overnight.  Able to engage and follow simple commands.  No restraints.  Last day of Ancef  6/6:  No events overnight.  Tolerated SBT 5.5 hrs on 6/5.  Awake.  Follows commands.  Restless but but responsive  6/7:  No events overnight.  Awake and alert.  Lucid.  SBT ongoing     6/15:  Case d/w dr horne--seen and examined in ICU--on TC x several days.  Awake but still restless and pulling on tubes.  In RAFib   6/16:  HR improved with BBx.  New temp spike to 101.7 on 6/15--possible UTI and started on CTX--No fevers.  CXR (6/15)-- Personally reviewed--no infiltrates   6/17:  UCx negative.  No fevers.  Normal WBC.  Trach changed to 6 cuffless/fenestrated 6/16.  Restless pulling on lines/trach.  No BM X 3 days  6/18:  No fevers.  Capped.  In recliner.  Still Encephalopathic.  + BM  6/19:  Encephalopathic but less agitated--restraints off.  Tm 99.7   6/20:  Sleeping all day yesterday--no fever--ABG OK.  Will cont to wean off day time seroquel.  Restless but is able to verbalize   6/21:  MS much improved--in chair--recognize me and attempting to speak    PMH:  As above.     PSH:  As above.     FH: Non Contributory other than those listed in HPI    Social History:  Unobtainable due to clinical condition     MEDICATIONS  (STANDING):  ALBUTerol    90 MICROgram(s) HFA Inhaler 2 Puff(s) Inhalation every 4 hours  ALPRAZolam 0.5 milliGRAM(s) Oral every 6 hours  aspirin  chewable 81 milliGRAM(s) Oral daily  budesonide 160 MICROgram(s)/formoterol 4.5 MICROgram(s) Inhaler 2 Puff(s) Inhalation two times a day  clopidogrel Tablet 75 milliGRAM(s) Oral daily  diltiazem    Tablet 90 milliGRAM(s) Oral every 6 hours  doxazosin 2 milliGRAM(s) Oral at bedtime  enoxaparin Injectable 40 milliGRAM(s) SubCutaneous every 12 hours  famotidine    Tablet 20 milliGRAM(s) Oral two times a day  gabapentin 400 milliGRAM(s) Oral three times a day  melatonin 5 milliGRAM(s) Oral at bedtime  metoprolol tartrate 25 milliGRAM(s) Oral two times a day  polyethylene glycol 3350 17 Gram(s) Oral daily  QUEtiapine 200 milliGRAM(s) Oral at bedtime  senna 2 Tablet(s) Oral at bedtime  thiamine 100 milliGRAM(s) Oral daily  tiotropium 18 MICROgram(s) Capsule 1 Capsule(s) Inhalation daily    MEDICATIONS  (PRN):  acetaminophen   Tablet .. 650 milliGRAM(s) Oral every 6 hours PRN Temp greater or equal to 38.5C (101.3F)  ibuprofen  Tablet. 400 milliGRAM(s) Oral every 6 hours PRN Moderate Pain (4 - 6)      Allergies: NKDA    ROS:  SEE BELOW        ICU Vital Signs Last 24 Hrs  T(C): 37.3 (21 Jun 2020 04:00), Max: 37.3 (20 Jun 2020 12:00)  T(F): 99.1 (21 Jun 2020 04:00), Max: 99.2 (21 Jun 2020 00:00)  HR: 102 (21 Jun 2020 07:00) (72 - 108)  BP: 144/88 (21 Jun 2020 07:00) (106/69 - 163/83)  BP(mean): 102 (21 Jun 2020 07:00) (67 - 103)  ABP: --  ABP(mean): --  RR: 24 (21 Jun 2020 07:00) (11 - 34)  SpO2: 92% (21 Jun 2020 07:00) (92% - 100%)          I&O's Summary    20 Jun 2020 07:01  -  21 Jun 2020 07:00  --------------------------------------------------------  IN: 3295 mL / OUT: 0 mL / NET: 3295 mL        Physical Exam:  SEE BELOW                      ABG - ( 19 Jun 2020 16:25 )  pH, Arterial: 7.42  pH, Blood: x     /  pCO2: 46    /  pO2: 133   / HCO3: 29    / Base Excess: 4.0   /  SaO2: 99                      DVT Prophylaxis:                                                            Contraindication:     Advanced Directives:    Discussed with:    Visit Information:  Time spent excluding procedure:      ** Time is exclusive of billed procedures and/or teaching and/or routine family updates.

## 2020-06-22 LAB
ANION GAP SERPL CALC-SCNC: 5 MMOL/L — SIGNIFICANT CHANGE UP (ref 5–17)
BUN SERPL-MCNC: 27 MG/DL — HIGH (ref 7–23)
CALCIUM SERPL-MCNC: 9.4 MG/DL — SIGNIFICANT CHANGE UP (ref 8.5–10.1)
CHLORIDE SERPL-SCNC: 103 MMOL/L — SIGNIFICANT CHANGE UP (ref 96–108)
CO2 SERPL-SCNC: 32 MMOL/L — HIGH (ref 22–31)
CREAT SERPL-MCNC: 0.48 MG/DL — LOW (ref 0.5–1.3)
GLUCOSE SERPL-MCNC: 106 MG/DL — HIGH (ref 70–99)
HCT VFR BLD CALC: 32.7 % — LOW (ref 39–50)
HGB BLD-MCNC: 10.4 G/DL — LOW (ref 13–17)
MAGNESIUM SERPL-MCNC: 1.9 MG/DL — SIGNIFICANT CHANGE UP (ref 1.6–2.6)
MCHC RBC-ENTMCNC: 30.4 PG — SIGNIFICANT CHANGE UP (ref 27–34)
MCHC RBC-ENTMCNC: 31.8 GM/DL — LOW (ref 32–36)
MCV RBC AUTO: 95.6 FL — SIGNIFICANT CHANGE UP (ref 80–100)
PHOSPHATE SERPL-MCNC: 5 MG/DL — HIGH (ref 2.5–4.5)
PLATELET # BLD AUTO: 225 K/UL — SIGNIFICANT CHANGE UP (ref 150–400)
POTASSIUM SERPL-MCNC: 3.7 MMOL/L — SIGNIFICANT CHANGE UP (ref 3.5–5.3)
POTASSIUM SERPL-SCNC: 3.7 MMOL/L — SIGNIFICANT CHANGE UP (ref 3.5–5.3)
RBC # BLD: 3.42 M/UL — LOW (ref 4.2–5.8)
RBC # FLD: 13.8 % — SIGNIFICANT CHANGE UP (ref 10.3–14.5)
SODIUM SERPL-SCNC: 140 MMOL/L — SIGNIFICANT CHANGE UP (ref 135–145)
WBC # BLD: 5.75 K/UL — SIGNIFICANT CHANGE UP (ref 3.8–10.5)
WBC # FLD AUTO: 5.75 K/UL — SIGNIFICANT CHANGE UP (ref 3.8–10.5)

## 2020-06-22 PROCEDURE — 99232 SBSQ HOSP IP/OBS MODERATE 35: CPT

## 2020-06-22 RX ORDER — DILTIAZEM HCL 120 MG
30 CAPSULE, EXT RELEASE 24 HR ORAL EVERY 6 HOURS
Refills: 0 | Status: DISCONTINUED | OUTPATIENT
Start: 2020-06-22 | End: 2020-06-24

## 2020-06-22 RX ORDER — GABAPENTIN 400 MG/1
400 CAPSULE ORAL THREE TIMES A DAY
Refills: 0 | Status: DISCONTINUED | OUTPATIENT
Start: 2020-06-22 | End: 2020-07-08

## 2020-06-22 RX ADMIN — CLOPIDOGREL BISULFATE 75 MILLIGRAM(S): 75 TABLET, FILM COATED ORAL at 10:37

## 2020-06-22 RX ADMIN — ENOXAPARIN SODIUM 40 MILLIGRAM(S): 100 INJECTION SUBCUTANEOUS at 10:37

## 2020-06-22 RX ADMIN — Medication 25 MILLIGRAM(S): at 10:37

## 2020-06-22 RX ADMIN — Medication 100 MILLIGRAM(S): at 10:37

## 2020-06-22 RX ADMIN — GABAPENTIN 400 MILLIGRAM(S): 400 CAPSULE ORAL at 06:32

## 2020-06-22 RX ADMIN — FAMOTIDINE 20 MILLIGRAM(S): 10 INJECTION INTRAVENOUS at 10:37

## 2020-06-22 RX ADMIN — Medication 2 MILLIGRAM(S): at 21:53

## 2020-06-22 RX ADMIN — Medication 90 MILLIGRAM(S): at 08:18

## 2020-06-22 RX ADMIN — GABAPENTIN 400 MILLIGRAM(S): 400 CAPSULE ORAL at 14:54

## 2020-06-22 RX ADMIN — Medication 0.5 MILLIGRAM(S): at 06:32

## 2020-06-22 RX ADMIN — QUETIAPINE FUMARATE 150 MILLIGRAM(S): 200 TABLET, FILM COATED ORAL at 21:52

## 2020-06-22 RX ADMIN — ENOXAPARIN SODIUM 40 MILLIGRAM(S): 100 INJECTION SUBCUTANEOUS at 21:53

## 2020-06-22 RX ADMIN — Medication 81 MILLIGRAM(S): at 10:37

## 2020-06-22 RX ADMIN — FAMOTIDINE 20 MILLIGRAM(S): 10 INJECTION INTRAVENOUS at 21:53

## 2020-06-22 RX ADMIN — GABAPENTIN 400 MILLIGRAM(S): 400 CAPSULE ORAL at 21:53

## 2020-06-22 RX ADMIN — Medication 0.5 MILLIGRAM(S): at 17:08

## 2020-06-22 RX ADMIN — Medication 0.5 MILLIGRAM(S): at 12:14

## 2020-06-22 RX ADMIN — Medication 25 MILLIGRAM(S): at 21:53

## 2020-06-22 RX ADMIN — Medication 5 MILLIGRAM(S): at 21:52

## 2020-06-22 NOTE — PROGRESS NOTE ADULT - SUBJECTIVE AND OBJECTIVE BOX
Subjective:    pat lying in bed, s/p trach with capped, no respiratory complaint.    Home Medications:  acetaminophen 325 mg oral tablet: 2 tab(s) orally every 6 hours, As needed, Temp greater or equal to 38.5C (101.3F) (02 Jun 2020 12:53)  albuterol 90 mcg/inh inhalation aerosol: 2 puff(s) inhaled every 4 hours (02 Jun 2020 12:53)  ALPRAZolam 0.5 mg oral tablet: 1 tab(s) orally every 6 hours (02 Jun 2020 12:53)  aspirin 81 mg oral tablet, chewable: 1 tab(s) orally once a day (02 Jun 2020 12:53)  clopidogrel 75 mg oral tablet: 1 tab(s) orally once a day (02 Jun 2020 12:53)  dilTIAZem 90 mg oral tablet: 1 tab(s) orally every 6 hours (02 Jun 2020 12:53)  doxazosin 2 mg oral tablet: 1 tab(s) orally once a day (at bedtime) (02 Jun 2020 12:53)  gabapentin 400 mg oral capsule: 1 cap(s) orally 3 times a day (02 Jun 2020 12:53)  pantoprazole 40 mg oral granule, enteric coated: 40 milligram(s) orally once a day (19 Apr 2020 03:12)  QUEtiapine 100 mg oral tablet: 1 tab(s) orally once a day (at bedtime) (02 Jun 2020 12:53)  QUEtiapine 50 mg oral tablet: 1 tab(s) orally once a day (02 Jun 2020 12:53)  Spiriva 18 mcg inhalation capsule: 1 cap(s) inhaled once a day (19 Apr 2020 03:12)  thiamine 100 mg oral tablet: 1 tab(s) orally once a day (02 Jun 2020 12:53)    MEDICATIONS  (STANDING):  ALBUTerol    90 MICROgram(s) HFA Inhaler 2 Puff(s) Inhalation every 4 hours  ALPRAZolam 0.5 milliGRAM(s) Oral every 6 hours  aspirin  chewable 81 milliGRAM(s) Oral daily  budesonide 160 MICROgram(s)/formoterol 4.5 MICROgram(s) Inhaler 2 Puff(s) Inhalation two times a day  clopidogrel Tablet 75 milliGRAM(s) Oral daily  diltiazem    Tablet 90 milliGRAM(s) Oral every 6 hours  doxazosin 2 milliGRAM(s) Oral at bedtime  enoxaparin Injectable 40 milliGRAM(s) SubCutaneous every 12 hours  famotidine    Tablet 20 milliGRAM(s) Oral two times a day  gabapentin   Solution 400 milliGRAM(s) Enteral Tube three times a day  melatonin 5 milliGRAM(s) Oral at bedtime  metoprolol tartrate 25 milliGRAM(s) Oral two times a day  polyethylene glycol 3350 17 Gram(s) Oral daily  QUEtiapine 150 milliGRAM(s) Oral at bedtime  senna 2 Tablet(s) Oral at bedtime  thiamine 100 milliGRAM(s) Oral daily  tiotropium 18 MICROgram(s) Capsule 1 Capsule(s) Inhalation daily    MEDICATIONS  (PRN):  acetaminophen   Tablet .. 650 milliGRAM(s) Oral every 6 hours PRN Temp greater or equal to 38.5C (101.3F)  ibuprofen  Tablet. 400 milliGRAM(s) Oral every 6 hours PRN Moderate Pain (4 - 6)      Allergies    Ceclor (Rash)  Duricef (Rash)    Intolerances        Vital Signs Last 24 Hrs  T(C): 36.1 (22 Jun 2020 12:00), Max: 37.4 (22 Jun 2020 00:00)  T(F): 97 (22 Jun 2020 12:00), Max: 99.4 (22 Jun 2020 00:00)  HR: 68 (22 Jun 2020 14:00) (63 - 102)  BP: 109/87 (22 Jun 2020 14:00) (103/64 - 153/99)  BP(mean): 92 (22 Jun 2020 14:00) (65 - 112)  RR: 23 (22 Jun 2020 14:00) (12 - 31)  SpO2: 93% (22 Jun 2020 14:00) (86% - 98%)      PHYSICAL EXAMINATION:    NECK:  Supple. No lymphadenopathy. Jugular venous pressure not elevated. Carotids equal.   HEART:   The cardiac impulse has a normal quality. Reg., Nl S1 and S2.  There are no murmurs, rubs or gallops noted  CHEST:  Chest crackles to auscultation. Normal respiratory effort.  ABDOMEN:  Soft and nontender.   EXTREMITIES:  There is no edema.       LABS:                        10.4   5.75  )-----------( 225      ( 22 Jun 2020 06:22 )             32.7     06-22    140  |  103  |  27<H>  ----------------------------<  106<H>  3.7   |  32<H>  |  0.48<L>    Ca    9.4      22 Jun 2020 06:22  Phos  5.0     06-22  Mg     1.9     06-22

## 2020-06-22 NOTE — PROGRESS NOTE ADULT - SUBJECTIVE AND OBJECTIVE BOX
Subjective:  No acute events.  Tolerating room air.    Vital Signs:  Vital Signs Last 24 Hrs  T(C): 36.1 (06-22-20 @ 12:00), Max: 37.4 (06-22-20 @ 00:00)  T(F): 97 (06-22-20 @ 12:00), Max: 99.4 (06-22-20 @ 00:00)  HR: 63 (06-22-20 @ 13:00) (63 - 102)  BP: 121/67 (06-22-20 @ 13:00) (103/64 - 153/99)  RR: 18 (06-22-20 @ 13:00) (12 - 31)  SpO2: 96% (06-22-20 @ 13:00) (86% - 98%) on room air    Telemetry/Alarms:  atrial fibrillation    Relevant labs, radiology and Medications reviewed                        10.4   5.75  )-----------( 225      ( 22 Jun 2020 06:22 )             32.7     06-22    140  |  103  |  27<H>  ----------------------------<  106<H>  3.7   |  32<H>  |  0.48<L>    Ca    9.4      22 Jun 2020 06:22  Phos  5.0     06-22  Mg     1.9     06-22        MEDICATIONS  (STANDING):  ALBUTerol    90 MICROgram(s) HFA Inhaler 2 Puff(s) Inhalation every 4 hours  ALPRAZolam 0.5 milliGRAM(s) Oral every 6 hours  aspirin  chewable 81 milliGRAM(s) Oral daily  budesonide 160 MICROgram(s)/formoterol 4.5 MICROgram(s) Inhaler 2 Puff(s) Inhalation two times a day  clopidogrel Tablet 75 milliGRAM(s) Oral daily  diltiazem    Tablet 90 milliGRAM(s) Oral every 6 hours  doxazosin 2 milliGRAM(s) Oral at bedtime  enoxaparin Injectable 40 milliGRAM(s) SubCutaneous every 12 hours  famotidine    Tablet 20 milliGRAM(s) Oral two times a day  gabapentin 400 milliGRAM(s) Oral three times a day  melatonin 5 milliGRAM(s) Oral at bedtime  metoprolol tartrate 25 milliGRAM(s) Oral two times a day  polyethylene glycol 3350 17 Gram(s) Oral daily  QUEtiapine 150 milliGRAM(s) Oral at bedtime  senna 2 Tablet(s) Oral at bedtime  thiamine 100 milliGRAM(s) Oral daily  tiotropium 18 MICROgram(s) Capsule 1 Capsule(s) Inhalation daily    MEDICATIONS  (PRN):  acetaminophen   Tablet .. 650 milliGRAM(s) Oral every 6 hours PRN Temp greater or equal to 38.5C (101.3F)  ibuprofen  Tablet. 400 milliGRAM(s) Oral every 6 hours PRN Moderate Pain (4 - 6)      Physical exam  Gen: NAD breathing comfortably  Neuro: AO  Card: S1 S2  Pulm: Equal bilaterally  Abd: PEG in place  Ext: no edema    I&O's Summary    21 Jun 2020 07:01  -  22 Jun 2020 07:00  --------------------------------------------------------  IN: 2338 mL / OUT: 0 mL / NET: 2338 mL    22 Jun 2020 07:01  -  22 Jun 2020 13:50  --------------------------------------------------------  IN: 102 mL / OUT: 0 mL / NET: 102 mL        Assessment  65y Male  w/ PAST MEDICAL & SURGICAL HISTORY:  Chronic CHF  COPD without exacerbation  Atrial fibrillation  Presence of Watchman left atrial appendage closure device  Hypertension  GERD (gastroesophageal reflux disease)  Chronic obstructive pulmonary disease (COPD)  Anemia  Falls  Meningitis  Collapsed lung  Alcohol withdrawal  Emphysema of lung  Cirrhosis  CHF (congestive heart failure)  Poor historian  Alcohol abuse  Chronic atrial fibrillation  Unilateral amputation of lower extremity below knee  Presence of Watchman left atrial appendage closure device  S/P BKA (below knee amputation) unilateral, left  Patient is a 65y old  Male who presents with a chief complaint of acute hypoxemic, hypercapneic resp failure  COPD exacerbation (22 Jun 2020 09:50)  .  1.  Chronic respiratory failure  2.  COPD exacerbation  3.  Pneumonia  4.  Leukocytosis and fevers - now resolved    Continue capping trach.  Continue diet.  Would not decannulate this hospital admission.  May follow up as outpatient with Dr. Morris for trach removal.  Discharge planning.    Discussed with Cardiothoracic Team at AM rounds.

## 2020-06-22 NOTE — CHART NOTE - NSCHARTNOTEFT_GEN_A_CORE
Assessment:   *pt admitted on 4/18 with COPD exacerbation--RRT called on 4/23 for AMS requiring intubation--extubated on 4/26 then re intubated on 4/29.  S/P Trach and PEG on 5/15 and trach capped.  Most recent active issue is persistent MSSA sepsis and CRP in sputum--Completed IV Ancef (6/5) and completed Cipro.  Chronic resp failure. TM Encephalopathy and agitation--almost resolved.  Mental status improved.    *(+1) generalized edema.  BM(+) 6/22.      *labs reviewed; pt with hyperphosphatemia, however, pt with mostly normal lytes. would continue with current TF formula and monitor phos and correct prn.    *per flowsheet, pt has received an average of 1478mL/day of jevity 1.5 over the past 7 days.  combined with prosource TF provided ~2457Kcal and 160g protein.  Which met ~100% of estimated calorie and protein needs.  however, now that wounds are healed, protein needs are lower.  Rec'd change TF Rx to Jevity 1.5 @ 80mL/hr with 1pkt Prosource TF.  Which will provide ~ 2440Kcal, 113g protein, 1216mL free water, total TF volume of 1600mL.    *new wt shows wt loss of 7Kg, possible fluid wt loss?  as pt previously had (+3) edema. continue to monitor and track wt weekly.    Recommendations:  1) change TF Rx to Jevity 1.5 @ 80mL/hr with 1pkt Prosource TF  2) monitor hydration status; change free water flushes to 60mL q1hr (=1200mL additional free water)  3) monitor TF tolerance; keep back of bed > 35 degrees  4) weekly wt checks to track/trend changes  5) monitor phos and correct prn      Diet Prescription: Diet, NPO with Tube Feed:   Tube Feeding Modality: Gastrostomy  Jevity 1.5 Randall (JEVITY1.5)  Total Volume for 24 Hours (mL): 1920  Continuous  Until Goal Tube Feed Rate (mL per Hour): 80  Tube Feed Duration (in Hours): 24  Tube Feed Start Time: 00:00  No Carb Prosource TF     Qty per Day:  6 (06-01-20 @ 13:37)      Wt Hx:  110.8Kg (6/17)  117.1Kg (5/9)  117.9Kg (4/18)      06-21-20 @ 07:01  -  06-22-20 @ 07:00  --------------------------------------------------------  IN: 2338 mL / OUT: 0 mL / NET: 2338 mL    06-22-20 @ 07:01  -  06-22-20 @ 09:03  --------------------------------------------------------  IN: 52 mL / OUT: 0 mL / NET: 52 mL        Estimated Needs:   2250-2700Kcal (25-30Kcal/Kg of adjust BW; 90Kg)  108-126g protein (1.2-1.4g/Kg adjusted BW: 90kg)  2250-2700mL (25-30mL/Kg of adjusted BW: 90Kg)      Pertinent Labs: 06-22 Na140 mmol/L Glu 106 mg/dL<H> K+ 3.7 mmol/L Cr  0.48 mg/dL<L> BUN 27 mg/dL<H> 06-22 Phos 5.0 mg/dL<H>      Skin: karla score = 22  no PU documented (previous PU, now documented as healed).        Monitoring and Evaluation:   [x] PO intake/Nutr support infusion [ x ] Tolerance to Nutr [ x ] weights [ x ] labs[ x ] follow up per protocol  [ ] other:

## 2020-06-22 NOTE — PROGRESS NOTE ADULT - ASSESSMENT
PROBLEMS;    Febrile illness- GNR in sputum-pseudomonas/proteus  s/p staph bactermia/sputum pseudomonas/staph  Ac on chronic hypercapnic & hypoxamic respiratory failure-s/p trach & PEG  sputum-Few Pseudomonas aeruginosa (Carbapenem Resistant)/Moderate Methicillin resistant Staphylococcus aureus  afib with RVR  OHS/VIJAY  AC flare of COPD/chronic vs acute on chronic bronchitis  Mild interstitial lung disease-NSIP h/o smoking  COVID negativex2  Pulmonary hypertension  Nonobstructing stone in the left ureterovesicular junction/ nonobstructing stone in the lower pole right kidney.  Subacute hemorrhage in the right rectus abdominis along the anterior sheath, measures 1.6 cm in thickness and extends from the umbilicus to the inferior myotendinous junction  Cirrhosis  Mild splenomegaly-portal venous hypertension.  Chronic afib w Watchman device  CHF  BPH  GERD  ETOH abuse  seizures disorder    PLAN;    pulmonary stable-trach capped-fio2 on RA-decd planning  Tube feeding as tolerated  supportive care  covid repeat testing-neg  Eliquis/asa 81  d/w staff

## 2020-06-22 NOTE — PROGRESS NOTE ADULT - ASSESSMENT
66 y/o male w PAFib w Watchman device, HFpEF , COPD on home O2, HTN, ETOH use and pt of size admitted on 4/18 with COPD    s/p trach peg  MSSA sepsis and CRP in sputum--Completed IV Ancef (6/5) and completed Cipro.     Chronic resp failure  TM Encephalopathy and agitation--much better--  seroquel slowly being titrated down  Possible UTI sepsis WO indwelling cath--completed 3 days of CTX.  UCx negative but sent after starting Abx    Plan:    Cap trach-- leave capped as toleratied  Xanax to 0.5 q 6  Seroquel  titrate down slowly agitation better  Cont clopidogrel and ASA for CAD and AFib  Cont Dilt 90 q6  TF to goal  HOB > 30  ready for placement

## 2020-06-22 NOTE — PROGRESS NOTE ADULT - SUBJECTIVE AND OBJECTIVE BOX
Events Overnight: Trach capped, less agitated, unrestrained, seroquel dose was cut, now afebrile    HPI:        66 y/o male w chronic AFib w Watchman device, HFpEF , COPD on home O2, HTN, ETOH use and pt of size admitted on 4/18 with COPD exacerbation--RRT called on 4/23 for AMS requiring intubation--extubated on 4/26 then re intubated on 4/29.  S/P Trach and PEG on 5/15    Had mssA bacteremia  is now on trach collar  had agitation now being treated with seroquel and xanax       MEDICATIONS  (STANDING):  ALBUTerol    90 MICROgram(s) HFA Inhaler 2 Puff(s) Inhalation every 4 hours  ALPRAZolam 0.5 milliGRAM(s) Oral every 6 hours  aspirin  chewable 81 milliGRAM(s) Oral daily  budesonide 160 MICROgram(s)/formoterol 4.5 MICROgram(s) Inhaler 2 Puff(s) Inhalation two times a day  clopidogrel Tablet 75 milliGRAM(s) Oral daily  diltiazem    Tablet 90 milliGRAM(s) Oral every 6 hours  doxazosin 2 milliGRAM(s) Oral at bedtime  enoxaparin Injectable 40 milliGRAM(s) SubCutaneous every 12 hours  famotidine    Tablet 20 milliGRAM(s) Oral two times a day  gabapentin 400 milliGRAM(s) Oral three times a day  melatonin 5 milliGRAM(s) Oral at bedtime  metoprolol tartrate 25 milliGRAM(s) Oral two times a day  polyethylene glycol 3350 17 Gram(s) Oral daily  QUEtiapine 150 milliGRAM(s) Oral at bedtime  senna 2 Tablet(s) Oral at bedtime  thiamine 100 milliGRAM(s) Oral daily  tiotropium 18 MICROgram(s) Capsule 1 Capsule(s) Inhalation daily    MEDICATIONS  (PRN):  acetaminophen   Tablet .. 650 milliGRAM(s) Oral every 6 hours PRN Temp greater or equal to 38.5C (101.3F)  ibuprofen  Tablet. 400 milliGRAM(s) Oral every 6 hours PRN Moderate Pain (4 - 6)      ICU Vital Signs Last 24 Hrs  T(C): 36.2 (22 Jun 2020 08:00), Max: 37.4 (22 Jun 2020 00:00)  T(F): 97.1 (22 Jun 2020 08:00), Max: 99.4 (22 Jun 2020 00:00)  HR: 95 (22 Jun 2020 09:00) (75 - 108)  BP: 103/64 (22 Jun 2020 09:00) (93/66 - 153/99)  BP(mean): 72 (22 Jun 2020 09:00) (65 - 112)  ABP: --  ABP(mean): --  RR: 27 (22 Jun 2020 09:00) (19 - 31)  SpO2: 95% (22 Jun 2020 09:00) (86% - 99%)    I&O's Summary    21 Jun 2020 07:01  -  22 Jun 2020 07:00  --------------------------------------------------------  IN: 2338 mL / OUT: 0 mL / NET: 2338 mL    22 Jun 2020 07:01  -  22 Jun 2020 09:50  --------------------------------------------------------  IN: 52 mL / OUT: 0 mL / NET: 52 mL      Physical Exam:    General - weak, comfortable    HEENT - s/ptrach    lungs - bilateral rhonchi    cv - rrr,     abdomen - s/p PEG soft     ext s/p BKA                          10.4   5.75  )-----------( 225      ( 22 Jun 2020 06:22 )             32.7     06-22    140  |  103  |  27<H>  ----------------------------<  106<H>  3.7   |  32<H>  |  0.48<L>    Ca    9.4      22 Jun 2020 06:22  Phos  5.0     06-22  Mg     1.9     06-22      DVT Prophylaxis:    Lovenox                                                             Advanced Directives: Full COde

## 2020-06-23 LAB
BASE EXCESS BLDA CALC-SCNC: 4.8 MMOL/L — HIGH (ref -2–2)
GAS PNL BLDA: SIGNIFICANT CHANGE UP
HCO3 BLDA-SCNC: 30 MMOL/L — HIGH (ref 21–29)
PCO2 BLDA: 52 MMHG — HIGH (ref 32–46)
PH BLDA: 7.38 — SIGNIFICANT CHANGE UP (ref 7.35–7.45)
PO2 BLDA: 60 MMHG — LOW (ref 74–108)
SAO2 % BLDA: 89 % — LOW (ref 92–96)

## 2020-06-23 PROCEDURE — 71045 X-RAY EXAM CHEST 1 VIEW: CPT | Mod: 26

## 2020-06-23 PROCEDURE — 99232 SBSQ HOSP IP/OBS MODERATE 35: CPT

## 2020-06-23 RX ADMIN — Medication 30 MILLIGRAM(S): at 12:04

## 2020-06-23 RX ADMIN — Medication 0.5 MILLIGRAM(S): at 00:28

## 2020-06-23 RX ADMIN — Medication 100 MILLIGRAM(S): at 09:38

## 2020-06-23 RX ADMIN — Medication 25 MILLIGRAM(S): at 09:39

## 2020-06-23 RX ADMIN — BUDESONIDE AND FORMOTEROL FUMARATE DIHYDRATE 2 PUFF(S): 160; 4.5 AEROSOL RESPIRATORY (INHALATION) at 08:33

## 2020-06-23 RX ADMIN — Medication 0.5 MILLIGRAM(S): at 09:38

## 2020-06-23 RX ADMIN — Medication 81 MILLIGRAM(S): at 09:39

## 2020-06-23 RX ADMIN — Medication 30 MILLIGRAM(S): at 23:36

## 2020-06-23 RX ADMIN — Medication 2 MILLIGRAM(S): at 22:55

## 2020-06-23 RX ADMIN — Medication 0.5 MILLIGRAM(S): at 12:04

## 2020-06-23 RX ADMIN — Medication 30 MILLIGRAM(S): at 00:28

## 2020-06-23 RX ADMIN — Medication 5 MILLIGRAM(S): at 22:55

## 2020-06-23 RX ADMIN — CLOPIDOGREL BISULFATE 75 MILLIGRAM(S): 75 TABLET, FILM COATED ORAL at 09:39

## 2020-06-23 RX ADMIN — FAMOTIDINE 20 MILLIGRAM(S): 10 INJECTION INTRAVENOUS at 09:39

## 2020-06-23 RX ADMIN — GABAPENTIN 400 MILLIGRAM(S): 400 CAPSULE ORAL at 22:54

## 2020-06-23 RX ADMIN — Medication 25 MILLIGRAM(S): at 22:55

## 2020-06-23 RX ADMIN — Medication 30 MILLIGRAM(S): at 06:27

## 2020-06-23 RX ADMIN — GABAPENTIN 400 MILLIGRAM(S): 400 CAPSULE ORAL at 14:33

## 2020-06-23 RX ADMIN — QUETIAPINE FUMARATE 150 MILLIGRAM(S): 200 TABLET, FILM COATED ORAL at 22:55

## 2020-06-23 RX ADMIN — Medication 0.5 MILLIGRAM(S): at 23:36

## 2020-06-23 RX ADMIN — FAMOTIDINE 20 MILLIGRAM(S): 10 INJECTION INTRAVENOUS at 22:55

## 2020-06-23 RX ADMIN — ENOXAPARIN SODIUM 40 MILLIGRAM(S): 100 INJECTION SUBCUTANEOUS at 09:39

## 2020-06-23 RX ADMIN — ALBUTEROL 2 PUFF(S): 90 AEROSOL, METERED ORAL at 08:33

## 2020-06-23 RX ADMIN — ALBUTEROL 2 PUFF(S): 90 AEROSOL, METERED ORAL at 15:58

## 2020-06-23 RX ADMIN — TIOTROPIUM BROMIDE 1 CAPSULE(S): 18 CAPSULE ORAL; RESPIRATORY (INHALATION) at 08:35

## 2020-06-23 RX ADMIN — Medication 30 MILLIGRAM(S): at 17:07

## 2020-06-23 RX ADMIN — ALBUTEROL 2 PUFF(S): 90 AEROSOL, METERED ORAL at 13:31

## 2020-06-23 RX ADMIN — Medication 0.5 MILLIGRAM(S): at 17:07

## 2020-06-23 RX ADMIN — GABAPENTIN 400 MILLIGRAM(S): 400 CAPSULE ORAL at 06:27

## 2020-06-23 RX ADMIN — ENOXAPARIN SODIUM 40 MILLIGRAM(S): 100 INJECTION SUBCUTANEOUS at 22:55

## 2020-06-23 NOTE — SWALLOW BEDSIDE ASSESSMENT ADULT - ADDITIONAL RECOMMENDATIONS
1)  NUTRITION FOLLOW UP. PT WITH HTN, CHF, GERD AND DYSPHAGIA. HE NEEDS TO BE MAINTAINED SOLELY NON ORALLY AT THIS TIME. HE HAS A PEG IN PLACE.     2) PT IS NOT A CANDIDATE FOR A SPEAKING VALVE AS HE DESATS, COUGHS AND BECOMES INCREASINGLY CONGESTED UPON ATTEMPTS TO MANUALLY OCCLUDE TRACHEOSTOMY.    3) PT IS NOT A CANDIDATE FOR ACUTE SPEECH PATHOLOGY INTERVENTION AT THIS TIME AS HE FRANKLY ASPIRATES THE MOST CONSERVATIVE FOOD TEXTURE(PUREE) AND DESATS/BECOMES INCREASINGLY CONGESTED WITH OVERT OROPHARYNGEAL FATIGUE UPON ATTEMPTS TO MANIPULATE OROPHARYNGEAL SWALLOW MUSCULATURE/OCCLUDE TRACHEOSTOMY. THUS, WILL NOT ACTIVELY FOLLOW AT THIS STAGE OF HIS RECOVERY. RECONSULT SHOULD MEDICAL STATUS IMPROVE/PT BE DECANNULATED. IF PT EVENTUALLY IS TRANSFERRED TO INPATIENT REHAB, SUGGEST A SPEECH PATH RE-EXAM AT THE FACILITY AS HIS CANDIDACY FOR INTERVENTION MAY IMPROVE WHEN ACUTE MEDICAL ISSUES ADRIEN.

## 2020-06-23 NOTE — SWALLOW BEDSIDE ASSESSMENT ADULT - SWALLOW EVAL: DIAGNOSIS
1) Pt exhibits periodically reduced orientation to feeding atop Oropharyngeal Dysphagia with post prandial aspiration with puree food which is the most conservative food texture(overt latent moist coughing, expulsion of food dye from tracheostomy). 2) Pt is awake but anxious, impulsive and internally distractible. Unable to direct to communication tasks but he did occasionally follow 1 step commands. and sometimes responsively verbalized when asked a personally relevant question. At these times, his utterances were produced without a yan primary speech-language pathology when attempting to talk. However, his voice was raspy, breathy, low in volume and wet c/w Dysphonia in setting of prolonged transient intubation/tracheostomy placement. Further, his verbalizations were variably contextually inappropriate c/w encephalopathy related to acute illness(i.e MSSA bacteremia, PNA, ETOH withdrawal, etc). 1) Pt exhibits periodically reduced orientation to feeding atop Oropharyngeal Dysphagia with post prandial aspiration with puree food which is the most conservative food texture(overt latent moist coughing, expulsion of food dye from tracheostomy). 2) Pt is awake but anxious, impulsive and internally distractible. Unable to direct to communication tasks but he did occasionally follow 1 step commands and sometimes responsively verbalized when asked a personally relevant question. At these times, his utterances were produced without a yan primary speech-language pathology when attempting to talk. However, his voice was raspy, breathy, low in volume and wet c/w Dysphonia in setting of prolonged transient intubation/tracheostomy placement. Further, his verbalizations were variably contextually inappropriate c/w encephalopathy related to acute illness(i.e MSSA bacteremia, PNA, ETOH withdrawal, etc).

## 2020-06-23 NOTE — SWALLOW BEDSIDE ASSESSMENT ADULT - COMMENTS
The patient's selected hospital course is notable for encephalopathy with generalized slowing on EEG, ETOH withdrawal, encephalopathy with agitation warranting Xanax/Seroquel, PNA with MSSA bacteremia, COPD exacerbation with respiratory failure s/p need for prolonged transient mechanical ventilation/tracheostomy placement, rapid A-Fib, and Dysphagia status post PEG placement. Note that pt was initially followed by this service earlier in the hospitalization and was diagnosed with altered mentation/Dysphagia prior to tracheostomy placement/PEG placement. The pt was subsequently D/C from program due to the extent of medical compromise at a time when he was vent dependent. He has an underlying history of A-Fib s/p Watchman Device, CHF, COPD, emphysema, HTN, ETOH abuse, cirrhosis, Dysphagia, GERD, past meningitis, previous collapsed lung, and prior need for tracheostomy/PEG placements when acutely deconditioned, both of which were removed prior to current hospitalization(but he again needed a PEG/tracheostomy placed during current hospitalization).  Additionally, pt is s/p a left BKA. The patient's selected hospital course is notable for encephalopathy with generalized slowing on EEG, ETOH withdrawal, agitation warranting Xanax/Seroquel, PNA with MSSA bacteremia, COPD exacerbation with respiratory failure s/p need for prolonged transient mechanical ventilation/tracheostomy placement, rapid A-Fib, and Dysphagia status post PEG placement. Note that pt was initially followed by this service earlier in this hospitalization and was diagnosed with altered mentation/Dysphagia prior to tracheostomy placement/PEG placement. The pt was subsequently D/C from program due to the extent of his medical compromise at a time when he was vent dependent. He has an underlying history of A-Fib s/p Watchman Device, CHF, COPD, emphysema, HTN, ETOH abuse, cirrhosis, Dysphagia, GERD, past meningitis, previous collapsed lung, and prior need for tracheostomy/PEG placements when acutely deconditioned(both of which were removed prior to current hospitalization but he again needed a PEG/tracheostomy placed during current hospitalization).  Additionally, pt is s/p a left BKA.

## 2020-06-23 NOTE — PROGRESS NOTE ADULT - SUBJECTIVE AND OBJECTIVE BOX
Subjective:    pat on trach collar last night for agitation & low oxygen. cXR no new infiltrates.    Home Medications:  acetaminophen 325 mg oral tablet: 2 tab(s) orally every 6 hours, As needed, Temp greater or equal to 38.5C (101.3F) (02 Jun 2020 12:53)  albuterol 90 mcg/inh inhalation aerosol: 2 puff(s) inhaled every 4 hours (02 Jun 2020 12:53)  ALPRAZolam 0.5 mg oral tablet: 1 tab(s) orally every 6 hours (02 Jun 2020 12:53)  aspirin 81 mg oral tablet, chewable: 1 tab(s) orally once a day (02 Jun 2020 12:53)  clopidogrel 75 mg oral tablet: 1 tab(s) orally once a day (02 Jun 2020 12:53)  dilTIAZem 90 mg oral tablet: 1 tab(s) orally every 6 hours (02 Jun 2020 12:53)  doxazosin 2 mg oral tablet: 1 tab(s) orally once a day (at bedtime) (02 Jun 2020 12:53)  gabapentin 400 mg oral capsule: 1 cap(s) orally 3 times a day (02 Jun 2020 12:53)  pantoprazole 40 mg oral granule, enteric coated: 40 milligram(s) orally once a day (19 Apr 2020 03:12)  QUEtiapine 100 mg oral tablet: 1 tab(s) orally once a day (at bedtime) (02 Jun 2020 12:53)  QUEtiapine 50 mg oral tablet: 1 tab(s) orally once a day (02 Jun 2020 12:53)  Spiriva 18 mcg inhalation capsule: 1 cap(s) inhaled once a day (19 Apr 2020 03:12)  thiamine 100 mg oral tablet: 1 tab(s) orally once a day (02 Jun 2020 12:53)    MEDICATIONS  (STANDING):  ALBUTerol    90 MICROgram(s) HFA Inhaler 2 Puff(s) Inhalation every 4 hours  ALPRAZolam 0.5 milliGRAM(s) Oral every 6 hours  aspirin  chewable 81 milliGRAM(s) Oral daily  budesonide 160 MICROgram(s)/formoterol 4.5 MICROgram(s) Inhaler 2 Puff(s) Inhalation two times a day  clopidogrel Tablet 75 milliGRAM(s) Oral daily  diltiazem    Tablet 30 milliGRAM(s) Oral every 6 hours  doxazosin 2 milliGRAM(s) Oral at bedtime  enoxaparin Injectable 40 milliGRAM(s) SubCutaneous every 12 hours  famotidine    Tablet 20 milliGRAM(s) Oral two times a day  gabapentin   Solution 400 milliGRAM(s) Enteral Tube three times a day  melatonin 5 milliGRAM(s) Oral at bedtime  metoprolol tartrate 25 milliGRAM(s) Oral two times a day  polyethylene glycol 3350 17 Gram(s) Oral daily  QUEtiapine 150 milliGRAM(s) Oral at bedtime  senna 2 Tablet(s) Oral at bedtime  thiamine 100 milliGRAM(s) Oral daily  tiotropium 18 MICROgram(s) Capsule 1 Capsule(s) Inhalation daily    MEDICATIONS  (PRN):  acetaminophen   Tablet .. 650 milliGRAM(s) Oral every 6 hours PRN Temp greater or equal to 38.5C (101.3F)  ibuprofen  Tablet. 400 milliGRAM(s) Oral every 6 hours PRN Moderate Pain (4 - 6)      Allergies    Ceclor (Rash)  Duricef (Rash)    Intolerances        Vital Signs Last 24 Hrs  T(C): 36.7 (23 Jun 2020 12:00), Max: 37.6 (22 Jun 2020 20:00)  T(F): 98 (23 Jun 2020 12:00), Max: 99.6 (22 Jun 2020 20:00)  HR: 82 (23 Jun 2020 14:00) (76 - 115)  BP: 141/85 (23 Jun 2020 14:00) (85/56 - 151/98)  BP(mean): 99 (23 Jun 2020 14:00) (62 - 108)  RR: 26 (23 Jun 2020 14:00) (17 - 34)  SpO2: 100% (23 Jun 2020 14:00) (89% - 100%)      PHYSICAL EXAMINATION:    NECK:  Supple. No lymphadenopathy. Jugular venous pressure not elevated. Carotids equal.   HEART:   The cardiac impulse has a normal quality. Reg., Nl S1 and S2.  There are no murmurs, rubs or gallops noted  CHEST:  Chest few rhonchi to auscultation. Normal respiratory effort.  ABDOMEN:  Soft and nontender.   EXTREMITIES:  There is no edema.       LABS:                        10.4   5.75  )-----------( 225      ( 22 Jun 2020 06:22 )             32.7     06-22    140  |  103  |  27<H>  ----------------------------<  106<H>  3.7   |  32<H>  |  0.48<L>    Ca    9.4      22 Jun 2020 06:22  Phos  5.0     06-22  Mg     1.9     06-22    Xray Chest 1 View-PORTABLE IMMEDIATE (06.23.20 @ 01:29) >  INTERPRETATION:  AP chest on June 23, 2020 at 1:06 AM. 2 views. Patient requires tracheostomy and has respiratory distress. Multiple Covid virus testing abdomen negative ending on May 30. There is history of CHF and COPD. Patient is anemic. Patient has renal failure and atrial fibrillation. Patient has long-term aspiration therapy an unspecified cirrhosis.    Heart is significantly enlarged.    No infiltrates or effusions are evident. Tracheostomy remains.    Chest is similar to Belen 15.    IMPRESSION: Heart enlargement and tracheostomy again noted.

## 2020-06-23 NOTE — SWALLOW BEDSIDE ASSESSMENT ADULT - SWALLOW EVAL: RECOMMENDED DIET
SUGGEST NPO RESTRICTION AT THIS TIME AND MAINTAIN SOLELY NON ORALLY VIA A PEG THAT IS ALREADY IN PLACE.

## 2020-06-23 NOTE — SWALLOW BEDSIDE ASSESSMENT ADULT - SLP GENERAL OBSERVATIONS
On encounter, a cuffless fenestrated tracheostomy was in place. He was congested.  Pt is awake but anxious, impulsive and internally distractible. Unable to direct to communication tasks but he did occasionally follow 1 step commands. and sometimes responsively verbalized when asked a personally relevant question. At these times, his utterances were produced without a yan primary speech-language pathology when attempting to talk. However, his voice was raspy, breathy, low in volume and wet c/w Dysphonia in setting of prolonged transient intubation/tracheostomy placement. Further, his verbalizations were variably contextually inappropriate c/w encephalopathy related to acute illness(i.e MSSA bacteremia, PNA, ETOH withdrawal, etc). On encounter, a cuffless fenestrated tracheostomy was in place. He was congested.  Pt is awake but anxious, impulsive and internally distractible. Unable to direct to communication tasks but he did occasionally follow 1 step commands and sometimes responsively verbalized when asked a personally relevant question. At these times, his utterances were produced without a yan primary speech-language pathology when attempting to talk. However, his voice was raspy, breathy, low in volume and wet c/w Dysphonia in setting of prolonged transient intubation/tracheostomy placement. Further, his verbalizations were variably contextually inappropriate c/w encephalopathy related to acute illness(i.e MSSA bacteremia, PNA, ETOH withdrawal, etc).

## 2020-06-23 NOTE — PROGRESS NOTE ADULT - SUBJECTIVE AND OBJECTIVE BOX
Events Overnight: Had episode lethargy plugging overnight, cxr is clear,  more awake this am.    HPI:      64 y/o male w chronic AFib w Watchman device, HFpEF , COPD on home O2, HTN, ETOH use and pt of size admitted on 4/18 with COPD exacerbation--RRT called on 4/23 for AMS requiring intubation--extubated on 4/26 then re intubated on 4/29.  S/P Trach and PEG on 5/15    Had mssA bacteremia  is now on trach collar  had agitation now being treated with seroquel and xanax      MEDICATIONS  (STANDING):  ALBUTerol    90 MICROgram(s) HFA Inhaler 2 Puff(s) Inhalation every 4 hours  ALPRAZolam 0.5 milliGRAM(s) Oral every 6 hours  aspirin  chewable 81 milliGRAM(s) Oral daily  budesonide 160 MICROgram(s)/formoterol 4.5 MICROgram(s) Inhaler 2 Puff(s) Inhalation two times a day  clopidogrel Tablet 75 milliGRAM(s) Oral daily  diltiazem    Tablet 30 milliGRAM(s) Oral every 6 hours  doxazosin 2 milliGRAM(s) Oral at bedtime  enoxaparin Injectable 40 milliGRAM(s) SubCutaneous every 12 hours  famotidine    Tablet 20 milliGRAM(s) Oral two times a day  gabapentin   Solution 400 milliGRAM(s) Enteral Tube three times a day  melatonin 5 milliGRAM(s) Oral at bedtime  metoprolol tartrate 25 milliGRAM(s) Oral two times a day  polyethylene glycol 3350 17 Gram(s) Oral daily  QUEtiapine 150 milliGRAM(s) Oral at bedtime  senna 2 Tablet(s) Oral at bedtime  thiamine 100 milliGRAM(s) Oral daily  tiotropium 18 MICROgram(s) Capsule 1 Capsule(s) Inhalation daily    MEDICATIONS  (PRN):  acetaminophen   Tablet .. 650 milliGRAM(s) Oral every 6 hours PRN Temp greater or equal to 38.5C (101.3F)  ibuprofen  Tablet. 400 milliGRAM(s) Oral every 6 hours PRN Moderate Pain (4 - 6)    ICU Vital Signs Last 24 Hrs  T(C): 36.3 (23 Jun 2020 08:00), Max: 37.6 (22 Jun 2020 20:00)  T(F): 97.4 (23 Jun 2020 08:00), Max: 99.6 (22 Jun 2020 20:00)  HR: 104 (23 Jun 2020 08:40) (63 - 115)  BP: 114/56 (23 Jun 2020 08:05) (85/56 - 151/98)  BP(mean): 70 (23 Jun 2020 08:05) (62 - 108)  ABP: --  ABP(mean): --  RR: 26 (23 Jun 2020 08:05) (12 - 34)  SpO2: 95% (23 Jun 2020 08:05) (89% - 98%)    I&O's Summary    22 Jun 2020 07:01  -  23 Jun 2020 07:00  --------------------------------------------------------  IN: 2345 mL / OUT: 0 mL / NET: 2345 mL    23 Jun 2020 07:01  -  23 Jun 2020 09:27  --------------------------------------------------------  IN: 1 mL / OUT: 0 mL / NET: 1 mL    Physical Exam:     General - weak, comfortable     neuro - conversant slightly confused, agitated at time     HEENT - s/p trach, slight drainage around trach     lungs - bilateral rhonchi     cv - rrr,      abdomen - s/p PEG soft, binder on     ext s/p BKA on left, no peripheral edema                        10.4   5.75  )-----------( 225      ( 22 Jun 2020 06:22 )             32.7     06-22    140  |  103  |  27<H>  ----------------------------<  106<H>  3.7   |  32<H>  |  0.48<L>    Ca    9.4      22 Jun 2020 06:22  Phos  5.0     06-22  Mg     1.9     06-22    ABG - ( 23 Jun 2020 01:12 )  pH, Arterial: 7.38  pH, Blood: x     /  pCO2: 52    /  pO2: 60    / HCO3: 30    / Base Excess: 4.8   /  SaO2: 89        DVT Prophylaxis:  Lovenox                                                             Advanced Directives: Full Code

## 2020-06-23 NOTE — PROGRESS NOTE ADULT - ASSESSMENT
66 y/o male w PAFib w Watchman device, HFpEF , COPD on home O2, HTN, ETOH use and pt of size admitted on 4/18 with COPD    s/p trach peg  MSSA sepsis and CRP in sputum--Completed IV Ancef (6/5) and completed Cipro.     last cxr clear, getting trouble when capped to long, will leave uncapped  Chronic resp failure  TM Encephalopathy and agitation--much better--   on seroquel     Cont clopidogrel and ASA for CAD and AFib  cardizem for afib  TF to goal  HOB > 30  ready for placement

## 2020-06-23 NOTE — SWALLOW BEDSIDE ASSESSMENT ADULT - SWALLOW EVAL: CRITERIA FOR SKILLED INTERVENTION MET
PT IS NOT A CANDIDATE FOR ACUTE SPEECH PATHOLOGY INTERVENTION AT THIS TIME AS HE FRANKLY ASPIRATES THE MOST CONSERVATIVE FOOD TEXTURE AND DESATS/BECOMES INCREASINGLY CONGESTED WITH OVERT OROPHARYNGEAL FATIGUE UPON ATTEMPTS TO MANIPULATE OROPHARYNGEAL SWALLOW MUSCULATURE/MANUALLY OCCLUDE TRACHEOSTOMY. THUS, WILL NOT ACTIVELY FOLLOW AT THIS STAGE OF HIS RECOVERY. THERAPY ATTEMPTS WOULD BE MORE OF A DETRIMENT THAN A BENEFIT AT THIS JUNCTURE IN TIME. RECONSULT SHOULD MEDICAL STATUS IMPROVE/PT BE DECANNULATED. IF PT EVENTUALLY IS TRANSFERRED TO INPATIENT REHAB, SUGGEST A SPEECH PATH RE-EXAM AS HIS CANDIDACY FOR INTERVENTION MAY IMPROVE WHEN ACUTE MEDICAL ISSUES ADRIEN.

## 2020-06-23 NOTE — SWALLOW BEDSIDE ASSESSMENT ADULT - PHARYNGEAL PHASE
Swallow trigger was timely to mildly latent. Laryngeal lift on palpation during swallowing trials was prominently reduced. Post prandial latent moist coughing, O2 desaturation and expulsion of fruit dye from tracheostomy was demonstrated with puree foods indicative of aspiration despite cues to attempt to employ compensatory swallowing maneuvers.

## 2020-06-23 NOTE — SWALLOW BEDSIDE ASSESSMENT ADULT - ASR SWALLOW RECOMMEND DIAG
MBS WOULD NOT  AS HE ASPIRATES THE MOST CONSERVATIVE FOOD TEXTURE DESPITE ATTEMPTS TO CUE TO EMPLOY COMPENSATORY SWALLOWING MANEUVERS.

## 2020-06-23 NOTE — PROGRESS NOTE ADULT - ASSESSMENT
PROBLEMS;    Febrile illness- GNR in sputum-pseudomonas/proteus-off abx  s/p staph bactermia/sputum pseudomonas/staph  Ac on chronic hypercapnic & hypoxamic respiratory failure-s/p trach & PEG  sputum-Few Pseudomonas aeruginosa (Carbapenem Resistant)/Moderate Methicillin resistant Staphylococcus aureus  afib with RVR  OHS/VIJAY  AC flare of COPD/chronic vs acute on chronic bronchitis  Mild interstitial lung disease-NSIP h/o smoking  COVID negativex2  Pulmonary hypertension  Nonobstructing stone in the left ureterovesicular junction/ nonobstructing stone in the lower pole right kidney.  Subacute hemorrhage in the right rectus abdominis along the anterior sheath, measures 1.6 cm in thickness and extends from the umbilicus to the inferior myotendinous junction  Cirrhosis  Mild splenomegaly-portal venous hypertension.  Chronic afib w Watchman device  CHF  BPH  GERD  ETOH abuse  seizures disorder    PLAN;    Trach on trach collar-titrate to NC if tolerated  trach suction  Tube feeding as tolerated  supportive care  covid repeat testing-neg  Eliquis/asa 81  d/w staff

## 2020-06-24 PROCEDURE — 99232 SBSQ HOSP IP/OBS MODERATE 35: CPT

## 2020-06-24 RX ORDER — METOPROLOL TARTRATE 50 MG
2.5 TABLET ORAL ONCE
Refills: 0 | Status: COMPLETED | OUTPATIENT
Start: 2020-06-24 | End: 2020-06-24

## 2020-06-24 RX ORDER — DILTIAZEM HCL 120 MG
60 CAPSULE, EXT RELEASE 24 HR ORAL EVERY 6 HOURS
Refills: 0 | Status: DISCONTINUED | OUTPATIENT
Start: 2020-06-24 | End: 2020-07-08

## 2020-06-24 RX ADMIN — Medication 60 MILLIGRAM(S): at 23:25

## 2020-06-24 RX ADMIN — Medication 5 MILLIGRAM(S): at 21:09

## 2020-06-24 RX ADMIN — Medication 25 MILLIGRAM(S): at 21:09

## 2020-06-24 RX ADMIN — GABAPENTIN 400 MILLIGRAM(S): 400 CAPSULE ORAL at 21:10

## 2020-06-24 RX ADMIN — FAMOTIDINE 20 MILLIGRAM(S): 10 INJECTION INTRAVENOUS at 21:09

## 2020-06-24 RX ADMIN — CLOPIDOGREL BISULFATE 75 MILLIGRAM(S): 75 TABLET, FILM COATED ORAL at 09:26

## 2020-06-24 RX ADMIN — Medication 0.5 MILLIGRAM(S): at 17:26

## 2020-06-24 RX ADMIN — ENOXAPARIN SODIUM 40 MILLIGRAM(S): 100 INJECTION SUBCUTANEOUS at 09:26

## 2020-06-24 RX ADMIN — FAMOTIDINE 20 MILLIGRAM(S): 10 INJECTION INTRAVENOUS at 09:25

## 2020-06-24 RX ADMIN — Medication 81 MILLIGRAM(S): at 09:26

## 2020-06-24 RX ADMIN — Medication 0.5 MILLIGRAM(S): at 05:26

## 2020-06-24 RX ADMIN — GABAPENTIN 400 MILLIGRAM(S): 400 CAPSULE ORAL at 05:26

## 2020-06-24 RX ADMIN — Medication 30 MILLIGRAM(S): at 05:26

## 2020-06-24 RX ADMIN — Medication 2.5 MILLIGRAM(S): at 18:41

## 2020-06-24 RX ADMIN — Medication 25 MILLIGRAM(S): at 09:26

## 2020-06-24 RX ADMIN — Medication 2 MILLIGRAM(S): at 21:10

## 2020-06-24 RX ADMIN — Medication 30 MILLIGRAM(S): at 17:27

## 2020-06-24 RX ADMIN — Medication 30 MILLIGRAM(S): at 11:02

## 2020-06-24 RX ADMIN — GABAPENTIN 400 MILLIGRAM(S): 400 CAPSULE ORAL at 13:44

## 2020-06-24 RX ADMIN — POLYETHYLENE GLYCOL 3350 17 GRAM(S): 17 POWDER, FOR SOLUTION ORAL at 09:25

## 2020-06-24 RX ADMIN — Medication 0.5 MILLIGRAM(S): at 11:01

## 2020-06-24 RX ADMIN — Medication 0.5 MILLIGRAM(S): at 23:24

## 2020-06-24 RX ADMIN — ENOXAPARIN SODIUM 40 MILLIGRAM(S): 100 INJECTION SUBCUTANEOUS at 21:09

## 2020-06-24 RX ADMIN — QUETIAPINE FUMARATE 150 MILLIGRAM(S): 200 TABLET, FILM COATED ORAL at 21:09

## 2020-06-24 RX ADMIN — Medication 100 MILLIGRAM(S): at 09:26

## 2020-06-24 NOTE — PROGRESS NOTE ADULT - SUBJECTIVE AND OBJECTIVE BOX
Events Overnight: remains on trach collar off vent, still with occasional agitation    HPI:    66 y/o male w chronic AFib w Watchman device, HFpEF , COPD on home O2, HTN, ETOH use and pt of size admitted on 4/18 with COPD exacerbation--RRT called on 4/23 for AMS requiring intubation--extubated on 4/26 then re intubated on 4/29.  S/P Trach and PEG on 5/15    Had mssA bacteremia was treated  is now on trach collar  had agitation now being treated with seroquel and xanax       MEDICATIONS  (STANDING):  ALBUTerol    90 MICROgram(s) HFA Inhaler 2 Puff(s) Inhalation every 4 hours  ALPRAZolam 0.5 milliGRAM(s) Oral every 6 hours  aspirin  chewable 81 milliGRAM(s) Oral daily  budesonide 160 MICROgram(s)/formoterol 4.5 MICROgram(s) Inhaler 2 Puff(s) Inhalation two times a day  clopidogrel Tablet 75 milliGRAM(s) Oral daily  diltiazem    Tablet 30 milliGRAM(s) Oral every 6 hours  doxazosin 2 milliGRAM(s) Oral at bedtime  enoxaparin Injectable 40 milliGRAM(s) SubCutaneous every 12 hours  famotidine    Tablet 20 milliGRAM(s) Oral two times a day  gabapentin   Solution 400 milliGRAM(s) Enteral Tube three times a day  melatonin 5 milliGRAM(s) Oral at bedtime  metoprolol tartrate 25 milliGRAM(s) Oral two times a day  polyethylene glycol 3350 17 Gram(s) Oral daily  QUEtiapine 150 milliGRAM(s) Oral at bedtime  senna 2 Tablet(s) Oral at bedtime  thiamine 100 milliGRAM(s) Oral daily  tiotropium 18 MICROgram(s) Capsule 1 Capsule(s) Inhalation daily    MEDICATIONS  (PRN):  acetaminophen   Tablet .. 650 milliGRAM(s) Oral every 6 hours PRN Temp greater or equal to 38.5C (101.3F)  ibuprofen  Tablet. 400 milliGRAM(s) Oral every 6 hours PRN Moderate Pain (4 - 6)      ICU Vital Signs Last 24 Hrs  T(C): 37.2 (24 Jun 2020 09:00), Max: 37.7 (24 Jun 2020 04:00)  T(F): 98.9 (24 Jun 2020 09:00), Max: 99.9 (24 Jun 2020 04:00)  HR: 99 (24 Jun 2020 12:00) (82 - 132)  BP: 154/115 (24 Jun 2020 12:00) (90/57 - 154/115)  BP(mean): 124 (24 Jun 2020 12:00) (63 - 125)  ABP: --  ABP(mean): --  RR: 28 (24 Jun 2020 11:00) (13 - 32)  SpO2: 92% (24 Jun 2020 10:00) (80% - 100%)    I&O's Summary    23 Jun 2020 07:01  -  24 Jun 2020 07:00  --------------------------------------------------------  IN: 2365 mL / OUT: 0 mL / NET: 2365 mL        Physical Exam:   general - comfortable , agitated at times   neuro - confused no focal   HEENT s/p trach, trach site clean, some secretions   lungs clear,    cv rrr   abdomen soft, non tender, s/p PEG   ext s/p left BKA       ABG - ( 23 Jun 2020 01:12 )  pH, Arterial: 7.38  pH, Blood: x     /  pCO2: 52    /  pO2: 60    / HCO3: 30    / Base Excess: 4.8   /  SaO2: 89          DVT Prophylaxis:    lovenox                                                             Advanced Directives: Full Code

## 2020-06-24 NOTE — PROGRESS NOTE ADULT - ASSESSMENT
66 y/o male w PAFib w Watchman device, HFpEF , COPD on home O2, HTN, ETOH use and pt of size admitted on 4/18 with COPD    s/p trach peg  MSSA sepsis and CRP in sputum--Completed IV Ancef (6/5) and completed Cipro.     last cxr clear, getting trouble when capped to long, will leave uncapped  Chronic resp failure  TM Encephalopathy and agitation--much better--   on seroquel xanx at night    Cont clopidogrel and ASA for CAD and AFib  cardizem for afib  TF to goal  HOB > 30  ready for placement 66 y/o male w PAFib w Watchman device, HFpEF , COPD on home O2, HTN, ETOH use and pt of size admitted on 4/18 with COPD    s/p trach peg  MSSA sepsis and CRP in sputum--Completed IV Ancef (6/5) and completed Cipro.     last cxr clear, getting trouble when capped to long, will leave uncapped  Chronic resp failure  TM Encephalopathy and agitation--much better--   on seroquel xanx at night    Cont clopidogrel and ASA for CAD and AFib  cardizem for afib  TF to goal  HOB > 30  ready for placement  failed speech swallow evaluation

## 2020-06-24 NOTE — PROGRESS NOTE ADULT - SUBJECTIVE AND OBJECTIVE BOX
Subjective:  No acute events overnight.  Tolerating capped tracheostomy on room air.    Vital Signs:  Vital Signs Last 24 Hrs  T(C): 37.7 (06-24-20 @ 04:00), Max: 37.7 (06-24-20 @ 04:00)  T(F): 99.9 (06-24-20 @ 04:00), Max: 99.9 (06-24-20 @ 04:00)  HR: 129 (06-24-20 @ 09:00) (76 - 132)  BP: 128/111 (06-24-20 @ 08:00) (90/57 - 153/116)  RR: 27 (06-24-20 @ 09:00) (13 - 32)  SpO2: 96% (06-24-20 @ 09:00) (80% - 100%) on (O2)    Telemetry/Alarms: atrial fibrillation    Relevant labs, radiology and Medications reviewed      MEDICATIONS  (STANDING):  ALBUTerol    90 MICROgram(s) HFA Inhaler 2 Puff(s) Inhalation every 4 hours  ALPRAZolam 0.5 milliGRAM(s) Oral every 6 hours  aspirin  chewable 81 milliGRAM(s) Oral daily  budesonide 160 MICROgram(s)/formoterol 4.5 MICROgram(s) Inhaler 2 Puff(s) Inhalation two times a day  clopidogrel Tablet 75 milliGRAM(s) Oral daily  diltiazem    Tablet 30 milliGRAM(s) Oral every 6 hours  doxazosin 2 milliGRAM(s) Oral at bedtime  enoxaparin Injectable 40 milliGRAM(s) SubCutaneous every 12 hours  famotidine    Tablet 20 milliGRAM(s) Oral two times a day  gabapentin   Solution 400 milliGRAM(s) Enteral Tube three times a day  melatonin 5 milliGRAM(s) Oral at bedtime  metoprolol tartrate 25 milliGRAM(s) Oral two times a day  polyethylene glycol 3350 17 Gram(s) Oral daily  QUEtiapine 150 milliGRAM(s) Oral at bedtime  senna 2 Tablet(s) Oral at bedtime  thiamine 100 milliGRAM(s) Oral daily  tiotropium 18 MICROgram(s) Capsule 1 Capsule(s) Inhalation daily    MEDICATIONS  (PRN):  acetaminophen   Tablet .. 650 milliGRAM(s) Oral every 6 hours PRN Temp greater or equal to 38.5C (101.3F)  ibuprofen  Tablet. 400 milliGRAM(s) Oral every 6 hours PRN Moderate Pain (4 - 6)      Physical exam  Gen: NAD  Neuro: AO and agitated  Card: S1 S2  Pulm: equal bilaterally  Abd: Soft PEG in place  Ext: no edema      I&O's Summary    23 Jun 2020 07:01  -  24 Jun 2020 07:00  --------------------------------------------------------  IN: 2365 mL / OUT: 0 mL / NET: 2365 mL        Assessment  65y Male  w/ PAST MEDICAL & SURGICAL HISTORY:  Chronic CHF  COPD without exacerbation  Atrial fibrillation  Presence of Watchman left atrial appendage closure device  Hypertension  GERD (gastroesophageal reflux disease)  Chronic obstructive pulmonary disease (COPD)  Anemia  Falls  Meningitis  Collapsed lung  Alcohol withdrawal  Emphysema of lung  Cirrhosis  CHF (congestive heart failure)  Poor historian  Alcohol abuse  Chronic atrial fibrillation  Unilateral amputation of lower extremity below knee  Presence of Watchman left atrial appendage closure device  S/P BKA (below knee amputation) unilateral, left    Patient is a 65y old  Male who presents with a chief complaint of acute hypoxemic, hypercapneic resp failure  COPD exacerbation (23 Jun 2020 14:31)  .  1.  Chronic respiratory failure  2.  COPD exacerbation  3.  Pneumonia  4.  Leukocytosis and fevers - now resolved    Continue capping trach as tolerated.  Continue diet.  Would not decannulate this hospital admission.  May follow up as outpatient with Dr. Morris for trach removal.  Discharge planning.    Discussed with Cardiothoracic Team at AM rounds.

## 2020-06-24 NOTE — PROGRESS NOTE ADULT - SUBJECTIVE AND OBJECTIVE BOX
Subjective:    pat with uncapped trach, moderate secreations, no respiratory distress.    Home Medications:  acetaminophen 325 mg oral tablet: 2 tab(s) orally every 6 hours, As needed, Temp greater or equal to 38.5C (101.3F) (02 Jun 2020 12:53)  albuterol 90 mcg/inh inhalation aerosol: 2 puff(s) inhaled every 4 hours (02 Jun 2020 12:53)  ALPRAZolam 0.5 mg oral tablet: 1 tab(s) orally every 6 hours (02 Jun 2020 12:53)  aspirin 81 mg oral tablet, chewable: 1 tab(s) orally once a day (02 Jun 2020 12:53)  clopidogrel 75 mg oral tablet: 1 tab(s) orally once a day (02 Jun 2020 12:53)  dilTIAZem 90 mg oral tablet: 1 tab(s) orally every 6 hours (02 Jun 2020 12:53)  doxazosin 2 mg oral tablet: 1 tab(s) orally once a day (at bedtime) (02 Jun 2020 12:53)  gabapentin 400 mg oral capsule: 1 cap(s) orally 3 times a day (02 Jun 2020 12:53)  pantoprazole 40 mg oral granule, enteric coated: 40 milligram(s) orally once a day (19 Apr 2020 03:12)  QUEtiapine 100 mg oral tablet: 1 tab(s) orally once a day (at bedtime) (02 Jun 2020 12:53)  QUEtiapine 50 mg oral tablet: 1 tab(s) orally once a day (02 Jun 2020 12:53)  Spiriva 18 mcg inhalation capsule: 1 cap(s) inhaled once a day (19 Apr 2020 03:12)  thiamine 100 mg oral tablet: 1 tab(s) orally once a day (02 Jun 2020 12:53)    MEDICATIONS  (STANDING):  ALBUTerol    90 MICROgram(s) HFA Inhaler 2 Puff(s) Inhalation every 4 hours  ALPRAZolam 0.5 milliGRAM(s) Oral every 6 hours  aspirin  chewable 81 milliGRAM(s) Oral daily  budesonide 160 MICROgram(s)/formoterol 4.5 MICROgram(s) Inhaler 2 Puff(s) Inhalation two times a day  clopidogrel Tablet 75 milliGRAM(s) Oral daily  diltiazem    Tablet 30 milliGRAM(s) Oral every 6 hours  doxazosin 2 milliGRAM(s) Oral at bedtime  enoxaparin Injectable 40 milliGRAM(s) SubCutaneous every 12 hours  famotidine    Tablet 20 milliGRAM(s) Oral two times a day  gabapentin   Solution 400 milliGRAM(s) Enteral Tube three times a day  melatonin 5 milliGRAM(s) Oral at bedtime  metoprolol tartrate 25 milliGRAM(s) Oral two times a day  polyethylene glycol 3350 17 Gram(s) Oral daily  QUEtiapine 150 milliGRAM(s) Oral at bedtime  senna 2 Tablet(s) Oral at bedtime  thiamine 100 milliGRAM(s) Oral daily  tiotropium 18 MICROgram(s) Capsule 1 Capsule(s) Inhalation daily    MEDICATIONS  (PRN):  acetaminophen   Tablet .. 650 milliGRAM(s) Oral every 6 hours PRN Temp greater or equal to 38.5C (101.3F)  ibuprofen  Tablet. 400 milliGRAM(s) Oral every 6 hours PRN Moderate Pain (4 - 6)      Allergies    Ceclor (Rash)  Duricef (Rash)    Intolerances        Vital Signs Last 24 Hrs  T(C): 37.2 (24 Jun 2020 09:00), Max: 37.7 (24 Jun 2020 04:00)  T(F): 98.9 (24 Jun 2020 09:00), Max: 99.9 (24 Jun 2020 04:00)  HR: 99 (24 Jun 2020 12:00) (82 - 132)  BP: 154/115 (24 Jun 2020 12:00) (90/57 - 154/115)  BP(mean): 124 (24 Jun 2020 12:00) (63 - 125)  RR: 28 (24 Jun 2020 11:00) (13 - 32)  SpO2: 92% (24 Jun 2020 10:00) (80% - 100%)      PHYSICAL EXAMINATION:    NECK:  Supple. No lymphadenopathy. Jugular venous pressure not elevated. Carotids equal.   HEART:   The cardiac impulse has a normal quality. Reg., Nl S1 and S2.  There are no murmurs, rubs or gallops noted  CHEST:  Chest is clear to auscultation. Normal respiratory effort.  ABDOMEN:  Soft and nontender.   EXTREMITIES:  There is no edema.       LABS:

## 2020-06-25 ENCOUNTER — RX RENEWAL (OUTPATIENT)
Age: 65
End: 2020-06-25

## 2020-06-25 PROCEDURE — 99233 SBSQ HOSP IP/OBS HIGH 50: CPT

## 2020-06-25 PROCEDURE — 99232 SBSQ HOSP IP/OBS MODERATE 35: CPT

## 2020-06-25 RX ORDER — QUETIAPINE FUMARATE 200 MG/1
200 TABLET, FILM COATED ORAL AT BEDTIME
Refills: 0 | Status: DISCONTINUED | OUTPATIENT
Start: 2020-06-25 | End: 2020-07-08

## 2020-06-25 RX ORDER — HALOPERIDOL DECANOATE 100 MG/ML
1 INJECTION INTRAMUSCULAR ONCE
Refills: 0 | Status: COMPLETED | OUTPATIENT
Start: 2020-06-25 | End: 2020-06-25

## 2020-06-25 RX ADMIN — Medication 2 MILLIGRAM(S): at 22:32

## 2020-06-25 RX ADMIN — GABAPENTIN 400 MILLIGRAM(S): 400 CAPSULE ORAL at 18:26

## 2020-06-25 RX ADMIN — CLOPIDOGREL BISULFATE 75 MILLIGRAM(S): 75 TABLET, FILM COATED ORAL at 10:59

## 2020-06-25 RX ADMIN — Medication 60 MILLIGRAM(S): at 18:26

## 2020-06-25 RX ADMIN — GABAPENTIN 400 MILLIGRAM(S): 400 CAPSULE ORAL at 06:37

## 2020-06-25 RX ADMIN — ENOXAPARIN SODIUM 40 MILLIGRAM(S): 100 INJECTION SUBCUTANEOUS at 22:33

## 2020-06-25 RX ADMIN — BUDESONIDE AND FORMOTEROL FUMARATE DIHYDRATE 2 PUFF(S): 160; 4.5 AEROSOL RESPIRATORY (INHALATION) at 09:43

## 2020-06-25 RX ADMIN — SENNA PLUS 2 TABLET(S): 8.6 TABLET ORAL at 22:33

## 2020-06-25 RX ADMIN — Medication 0.5 MILLIGRAM(S): at 06:36

## 2020-06-25 RX ADMIN — Medication 0.5 MILLIGRAM(S): at 18:26

## 2020-06-25 RX ADMIN — FAMOTIDINE 20 MILLIGRAM(S): 10 INJECTION INTRAVENOUS at 22:32

## 2020-06-25 RX ADMIN — Medication 5 MILLIGRAM(S): at 22:32

## 2020-06-25 RX ADMIN — GABAPENTIN 400 MILLIGRAM(S): 400 CAPSULE ORAL at 22:32

## 2020-06-25 RX ADMIN — Medication 81 MILLIGRAM(S): at 11:00

## 2020-06-25 RX ADMIN — Medication 25 MILLIGRAM(S): at 11:00

## 2020-06-25 RX ADMIN — FAMOTIDINE 20 MILLIGRAM(S): 10 INJECTION INTRAVENOUS at 10:59

## 2020-06-25 RX ADMIN — BUDESONIDE AND FORMOTEROL FUMARATE DIHYDRATE 2 PUFF(S): 160; 4.5 AEROSOL RESPIRATORY (INHALATION) at 20:12

## 2020-06-25 RX ADMIN — TIOTROPIUM BROMIDE 1 CAPSULE(S): 18 CAPSULE ORAL; RESPIRATORY (INHALATION) at 09:43

## 2020-06-25 RX ADMIN — Medication 100 MILLIGRAM(S): at 11:01

## 2020-06-25 RX ADMIN — Medication 25 MILLIGRAM(S): at 22:32

## 2020-06-25 RX ADMIN — ALBUTEROL 2 PUFF(S): 90 AEROSOL, METERED ORAL at 09:42

## 2020-06-25 RX ADMIN — Medication 400 MILLIGRAM(S): at 18:26

## 2020-06-25 RX ADMIN — HALOPERIDOL DECANOATE 1 MILLIGRAM(S): 100 INJECTION INTRAMUSCULAR at 19:25

## 2020-06-25 RX ADMIN — Medication 60 MILLIGRAM(S): at 06:36

## 2020-06-25 RX ADMIN — ENOXAPARIN SODIUM 40 MILLIGRAM(S): 100 INJECTION SUBCUTANEOUS at 11:01

## 2020-06-25 RX ADMIN — ALBUTEROL 2 PUFF(S): 90 AEROSOL, METERED ORAL at 20:13

## 2020-06-25 RX ADMIN — QUETIAPINE FUMARATE 200 MILLIGRAM(S): 200 TABLET, FILM COATED ORAL at 22:33

## 2020-06-25 NOTE — PROGRESS NOTE ADULT - SUBJECTIVE AND OBJECTIVE BOX
Events Overnight: agitated , overnight, yesterday had rapid afib, calmer this am    HPI:     66 y/o male w chronic AFib w Watchman device, HFpEF , COPD on home O2, HTN, ETOH use   admitted on 4/18 with COPD exacerbation--RRT called on 4/23 for AMS requiring intubation--extubated on 4/26 then re intubated on 4/29.  S/P Trach and PEG on 5/15    Had mssA bacteremia was treated  is now on trach collar  had agitation now being treated with seroquel and xanax     ROS cant obtain s/p trach    MEDICATIONS  (STANDING):  ALBUTerol    90 MICROgram(s) HFA Inhaler 2 Puff(s) Inhalation every 4 hours  ALPRAZolam 0.5 milliGRAM(s) Oral every 6 hours  aspirin  chewable 81 milliGRAM(s) Oral daily  budesonide 160 MICROgram(s)/formoterol 4.5 MICROgram(s) Inhaler 2 Puff(s) Inhalation two times a day  clopidogrel Tablet 75 milliGRAM(s) Oral daily  diltiazem    Tablet 60 milliGRAM(s) Oral every 6 hours  doxazosin 2 milliGRAM(s) Oral at bedtime  enoxaparin Injectable 40 milliGRAM(s) SubCutaneous every 12 hours  famotidine    Tablet 20 milliGRAM(s) Oral two times a day  gabapentin   Solution 400 milliGRAM(s) Enteral Tube three times a day  melatonin 5 milliGRAM(s) Oral at bedtime  metoprolol tartrate 25 milliGRAM(s) Oral two times a day  polyethylene glycol 3350 17 Gram(s) Oral daily  QUEtiapine 200 milliGRAM(s) Oral at bedtime  senna 2 Tablet(s) Oral at bedtime  thiamine 100 milliGRAM(s) Oral daily  tiotropium 18 MICROgram(s) Capsule 1 Capsule(s) Inhalation daily    MEDICATIONS  (PRN):  acetaminophen   Tablet .. 650 milliGRAM(s) Oral every 6 hours PRN Temp greater or equal to 38.5C (101.3F)  ibuprofen  Tablet. 400 milliGRAM(s) Oral every 6 hours PRN Moderate Pain (4 - 6)    ICU Vital Signs Last 24 Hrs  T(C): 36.6 (25 Jun 2020 06:00), Max: 37.2 (24 Jun 2020 13:00)  T(F): 97.8 (25 Jun 2020 06:00), Max: 99 (24 Jun 2020 13:00)  HR: 98 (25 Jun 2020 09:00) (70 - 142)  BP: 112/83 (25 Jun 2020 09:00) (88/60 - 162/118)  BP(mean): 89 (25 Jun 2020 09:00) (59 - 127)  ABP: --  ABP(mean): --  RR: 24 (25 Jun 2020 09:00) (17 - 32)  SpO2: 94% (25 Jun 2020 09:00) (89% - 100%)    I&O's Summary    24 Jun 2020 07:01  -  25 Jun 2020 07:00  --------------------------------------------------------  IN: 2228 mL / OUT: 0 mL / NET: 2228 mL        Physical Exam:     General - comfortable      HEENT - s/p trach,nc/at      lungs scattered rhonchi      cv irreg, irreg      abdomen soft, s/p PEg      ext s/p left bka      neuro - conversant slightly confused      skin - no rashes                                   DVT Prophylaxis:                                                                 Advanced Directives:

## 2020-06-25 NOTE — PROGRESS NOTE ADULT - SUBJECTIVE AND OBJECTIVE BOX
Subjective:    pat no new compalint, trach on RA, no respiratory distress.    Home Medications:  acetaminophen 325 mg oral tablet: 2 tab(s) orally every 6 hours, As needed, Temp greater or equal to 38.5C (101.3F) (02 Jun 2020 12:53)  albuterol 90 mcg/inh inhalation aerosol: 2 puff(s) inhaled every 4 hours (02 Jun 2020 12:53)  ALPRAZolam 0.5 mg oral tablet: 1 tab(s) orally every 6 hours (02 Jun 2020 12:53)  aspirin 81 mg oral tablet, chewable: 1 tab(s) orally once a day (02 Jun 2020 12:53)  clopidogrel 75 mg oral tablet: 1 tab(s) orally once a day (02 Jun 2020 12:53)  dilTIAZem 90 mg oral tablet: 1 tab(s) orally every 6 hours (02 Jun 2020 12:53)  doxazosin 2 mg oral tablet: 1 tab(s) orally once a day (at bedtime) (02 Jun 2020 12:53)  gabapentin 400 mg oral capsule: 1 cap(s) orally 3 times a day (02 Jun 2020 12:53)  pantoprazole 40 mg oral granule, enteric coated: 40 milligram(s) orally once a day (19 Apr 2020 03:12)  QUEtiapine 100 mg oral tablet: 1 tab(s) orally once a day (at bedtime) (02 Jun 2020 12:53)  QUEtiapine 50 mg oral tablet: 1 tab(s) orally once a day (02 Jun 2020 12:53)  Spiriva 18 mcg inhalation capsule: 1 cap(s) inhaled once a day (19 Apr 2020 03:12)  thiamine 100 mg oral tablet: 1 tab(s) orally once a day (02 Jun 2020 12:53)    MEDICATIONS  (STANDING):  ALBUTerol    90 MICROgram(s) HFA Inhaler 2 Puff(s) Inhalation every 4 hours  ALPRAZolam 0.5 milliGRAM(s) Oral every 6 hours  aspirin  chewable 81 milliGRAM(s) Oral daily  budesonide 160 MICROgram(s)/formoterol 4.5 MICROgram(s) Inhaler 2 Puff(s) Inhalation two times a day  clopidogrel Tablet 75 milliGRAM(s) Oral daily  diltiazem    Tablet 60 milliGRAM(s) Oral every 6 hours  doxazosin 2 milliGRAM(s) Oral at bedtime  enoxaparin Injectable 40 milliGRAM(s) SubCutaneous every 12 hours  famotidine    Tablet 20 milliGRAM(s) Oral two times a day  gabapentin   Solution 400 milliGRAM(s) Enteral Tube three times a day  melatonin 5 milliGRAM(s) Oral at bedtime  metoprolol tartrate 25 milliGRAM(s) Oral two times a day  polyethylene glycol 3350 17 Gram(s) Oral daily  QUEtiapine 200 milliGRAM(s) Oral at bedtime  senna 2 Tablet(s) Oral at bedtime  thiamine 100 milliGRAM(s) Oral daily  tiotropium 18 MICROgram(s) Capsule 1 Capsule(s) Inhalation daily    MEDICATIONS  (PRN):  acetaminophen   Tablet .. 650 milliGRAM(s) Oral every 6 hours PRN Temp greater or equal to 38.5C (101.3F)  ibuprofen  Tablet. 400 milliGRAM(s) Oral every 6 hours PRN Moderate Pain (4 - 6)      Allergies    Ceclor (Rash)  Duricef (Rash)    Intolerances        Vital Signs Last 24 Hrs  T(C): 36.6 (25 Jun 2020 06:00), Max: 37.2 (24 Jun 2020 17:00)  T(F): 97.8 (25 Jun 2020 06:00), Max: 98.9 (24 Jun 2020 17:00)  HR: 89 (25 Jun 2020 13:00) (70 - 142)  BP: 93/45 (25 Jun 2020 13:00) (88/60 - 162/118)  BP(mean): 55 (25 Jun 2020 13:00) (55 - 127)  RR: 28 (25 Jun 2020 13:00) (18 - 32)  SpO2: 97% (25 Jun 2020 13:00) (93% - 100%)      PHYSICAL EXAMINATION:    NECK:  Supple. No lymphadenopathy. Jugular venous pressure not elevated. Carotids equal.   HEART:   The cardiac impulse has a normal quality. Reg., Nl S1 and S2.  There are no murmurs, rubs or gallops noted  CHEST:  Chest crackles to auscultation. Normal respiratory effort.  ABDOMEN:  Soft and nontender.   EXTREMITIES:  There is no edema.       LABS:

## 2020-06-25 NOTE — PROGRESS NOTE ADULT - ASSESSMENT
64 y/o male w PAFib w Watchman device, HFpEF , COPD on home O2, HTN, ETOH use and pt of size admitted on 4/18 with COPD    s/p trach peg  MSSA sepsis and CRP in sputum--Completed IV Ancef (6/5) and completed  treatment.     last cxr clear,  will leave trach ujncapped    Chronic resp failure  TM Encephalopathy and agitation- will increase seroquel   Cont clopidogrel and ASA for CAD and AFib  cardizem inc for afib  TF to goal  HOB > 30  ready for placement when agitation better  failed speech swallow evaluation

## 2020-06-25 NOTE — PROGRESS NOTE ADULT - SUBJECTIVE AND OBJECTIVE BOX
Subjective:  Pt seen. No new events    Vital Signs:  Vital Signs Last 24 Hrs  T(C): 36.7 (06-25-20 @ 14:00), Max: 37.2 (06-24-20 @ 17:00)  T(F): 98 (06-25-20 @ 14:00), Max: 98.9 (06-24-20 @ 17:00)  HR: 90 (06-25-20 @ 15:00) (70 - 142)  BP: 113/62 (06-25-20 @ 15:00) (88/60 - 162/118)  RR: 20 (06-25-20 @ 15:00) (18 - 32)  SpO2: 97% (06-25-20 @ 15:00) (93% - 100%) on (O2)    Telemetry/Alarms:    Relevant labs, radiology and Medications reviewed            MEDICATIONS  (STANDING):  ALBUTerol    90 MICROgram(s) HFA Inhaler 2 Puff(s) Inhalation every 4 hours  ALPRAZolam 0.5 milliGRAM(s) Oral every 6 hours  aspirin  chewable 81 milliGRAM(s) Oral daily  budesonide 160 MICROgram(s)/formoterol 4.5 MICROgram(s) Inhaler 2 Puff(s) Inhalation two times a day  clopidogrel Tablet 75 milliGRAM(s) Oral daily  diltiazem    Tablet 60 milliGRAM(s) Oral every 6 hours  doxazosin 2 milliGRAM(s) Oral at bedtime  enoxaparin Injectable 40 milliGRAM(s) SubCutaneous every 12 hours  famotidine    Tablet 20 milliGRAM(s) Oral two times a day  gabapentin   Solution 400 milliGRAM(s) Enteral Tube three times a day  melatonin 5 milliGRAM(s) Oral at bedtime  metoprolol tartrate 25 milliGRAM(s) Oral two times a day  polyethylene glycol 3350 17 Gram(s) Oral daily  QUEtiapine 200 milliGRAM(s) Oral at bedtime  senna 2 Tablet(s) Oral at bedtime  thiamine 100 milliGRAM(s) Oral daily  tiotropium 18 MICROgram(s) Capsule 1 Capsule(s) Inhalation daily    MEDICATIONS  (PRN):  acetaminophen   Tablet .. 650 milliGRAM(s) Oral every 6 hours PRN Temp greater or equal to 38.5C (101.3F)  ibuprofen  Tablet. 400 milliGRAM(s) Oral every 6 hours PRN Moderate Pain (4 - 6)      Physical exam  Gen: NAD  Neuro: Alert  Card: RRR  neck- trach midline  Pulm: equal bilaterally  Abd: Soft PEG in place  Ext: no edema    I&O's Summary    24 Jun 2020 07:01  -  25 Jun 2020 07:00  --------------------------------------------------------  IN: 2228 mL / OUT: 0 mL / NET: 2228 mL        Assessment  65y Male  w/ PAST MEDICAL & SURGICAL HISTORY:  Chronic CHF  COPD without exacerbation  Atrial fibrillation  Presence of Watchman left atrial appendage closure device  Hypertension  GERD (gastroesophageal reflux disease)  Chronic obstructive pulmonary disease (COPD)  Anemia  Falls  Meningitis  Collapsed lung  Alcohol withdrawal  Emphysema of lung  Cirrhosis  CHF (congestive heart failure)  Poor historian  Alcohol abuse  Chronic atrial fibrillation  Unilateral amputation of lower extremity below knee  Presence of Watchman left atrial appendage closure device  S/P BKA (below knee amputation) unilateral, left  admitted with complaints of Patient is a 65y old  Male who presents with a chief complaint of acute hypoxemic, hypercapneic resp failure  COPD exacerbation (25 Jun 2020 13:39)  65y old  Male with PMH CHF, COPD, and acute resp failure requiring trach in 2015 and 2017, presents with a chief complaint of acute hypoxemic, hypercapnic resp failure, and COPD exacerbation complicated by PNA. S/P trach and PEG 5/15. 6/15 Sputum cultures with proteus and pseudomonas.     cap trach as tolerated  No decannulation this hospital admission.  May follow up as outpatient with Dr. Morris for trach removal.  Discharge planning.    Discussed with Cardiothoracic Team at AM rounds.

## 2020-06-26 LAB
ANION GAP SERPL CALC-SCNC: 3 MMOL/L — LOW (ref 5–17)
BUN SERPL-MCNC: 19 MG/DL — SIGNIFICANT CHANGE UP (ref 7–23)
CALCIUM SERPL-MCNC: 9 MG/DL — SIGNIFICANT CHANGE UP (ref 8.5–10.1)
CHLORIDE SERPL-SCNC: 105 MMOL/L — SIGNIFICANT CHANGE UP (ref 96–108)
CO2 SERPL-SCNC: 32 MMOL/L — HIGH (ref 22–31)
CREAT SERPL-MCNC: 0.59 MG/DL — SIGNIFICANT CHANGE UP (ref 0.5–1.3)
GLUCOSE SERPL-MCNC: 117 MG/DL — HIGH (ref 70–99)
HCT VFR BLD CALC: 31.4 % — LOW (ref 39–50)
HGB BLD-MCNC: 10.1 G/DL — LOW (ref 13–17)
MCHC RBC-ENTMCNC: 30.1 PG — SIGNIFICANT CHANGE UP (ref 27–34)
MCHC RBC-ENTMCNC: 32.2 GM/DL — SIGNIFICANT CHANGE UP (ref 32–36)
MCV RBC AUTO: 93.5 FL — SIGNIFICANT CHANGE UP (ref 80–100)
PLATELET # BLD AUTO: 253 K/UL — SIGNIFICANT CHANGE UP (ref 150–400)
POTASSIUM SERPL-MCNC: 3.7 MMOL/L — SIGNIFICANT CHANGE UP (ref 3.5–5.3)
POTASSIUM SERPL-SCNC: 3.7 MMOL/L — SIGNIFICANT CHANGE UP (ref 3.5–5.3)
RBC # BLD: 3.36 M/UL — LOW (ref 4.2–5.8)
RBC # FLD: 14.5 % — SIGNIFICANT CHANGE UP (ref 10.3–14.5)
SODIUM SERPL-SCNC: 140 MMOL/L — SIGNIFICANT CHANGE UP (ref 135–145)
WBC # BLD: 4.82 K/UL — SIGNIFICANT CHANGE UP (ref 3.8–10.5)
WBC # FLD AUTO: 4.82 K/UL — SIGNIFICANT CHANGE UP (ref 3.8–10.5)

## 2020-06-26 PROCEDURE — 99232 SBSQ HOSP IP/OBS MODERATE 35: CPT

## 2020-06-26 PROCEDURE — 99233 SBSQ HOSP IP/OBS HIGH 50: CPT

## 2020-06-26 RX ADMIN — Medication 100 MILLIGRAM(S): at 11:17

## 2020-06-26 RX ADMIN — ALBUTEROL 2 PUFF(S): 90 AEROSOL, METERED ORAL at 12:45

## 2020-06-26 RX ADMIN — TIOTROPIUM BROMIDE 1 CAPSULE(S): 18 CAPSULE ORAL; RESPIRATORY (INHALATION) at 08:27

## 2020-06-26 RX ADMIN — SENNA PLUS 2 TABLET(S): 8.6 TABLET ORAL at 21:35

## 2020-06-26 RX ADMIN — GABAPENTIN 400 MILLIGRAM(S): 400 CAPSULE ORAL at 14:04

## 2020-06-26 RX ADMIN — FAMOTIDINE 20 MILLIGRAM(S): 10 INJECTION INTRAVENOUS at 21:33

## 2020-06-26 RX ADMIN — Medication 25 MILLIGRAM(S): at 11:17

## 2020-06-26 RX ADMIN — Medication 400 MILLIGRAM(S): at 21:34

## 2020-06-26 RX ADMIN — POLYETHYLENE GLYCOL 3350 17 GRAM(S): 17 POWDER, FOR SOLUTION ORAL at 11:17

## 2020-06-26 RX ADMIN — Medication 60 MILLIGRAM(S): at 23:22

## 2020-06-26 RX ADMIN — Medication 5 MILLIGRAM(S): at 21:32

## 2020-06-26 RX ADMIN — Medication 60 MILLIGRAM(S): at 06:20

## 2020-06-26 RX ADMIN — BUDESONIDE AND FORMOTEROL FUMARATE DIHYDRATE 2 PUFF(S): 160; 4.5 AEROSOL RESPIRATORY (INHALATION) at 08:30

## 2020-06-26 RX ADMIN — Medication 2 MILLIGRAM(S): at 21:35

## 2020-06-26 RX ADMIN — CLOPIDOGREL BISULFATE 75 MILLIGRAM(S): 75 TABLET, FILM COATED ORAL at 11:17

## 2020-06-26 RX ADMIN — GABAPENTIN 400 MILLIGRAM(S): 400 CAPSULE ORAL at 06:19

## 2020-06-26 RX ADMIN — Medication 81 MILLIGRAM(S): at 11:17

## 2020-06-26 RX ADMIN — Medication 0.5 MILLIGRAM(S): at 23:22

## 2020-06-26 RX ADMIN — QUETIAPINE FUMARATE 200 MILLIGRAM(S): 200 TABLET, FILM COATED ORAL at 21:32

## 2020-06-26 RX ADMIN — ENOXAPARIN SODIUM 40 MILLIGRAM(S): 100 INJECTION SUBCUTANEOUS at 11:17

## 2020-06-26 RX ADMIN — Medication 0.5 MILLIGRAM(S): at 06:20

## 2020-06-26 RX ADMIN — Medication 0.5 MILLIGRAM(S): at 17:38

## 2020-06-26 RX ADMIN — Medication 60 MILLIGRAM(S): at 17:38

## 2020-06-26 RX ADMIN — Medication 60 MILLIGRAM(S): at 11:27

## 2020-06-26 RX ADMIN — ALBUTEROL 2 PUFF(S): 90 AEROSOL, METERED ORAL at 08:29

## 2020-06-26 RX ADMIN — GABAPENTIN 400 MILLIGRAM(S): 400 CAPSULE ORAL at 21:32

## 2020-06-26 RX ADMIN — Medication 0.5 MILLIGRAM(S): at 00:36

## 2020-06-26 RX ADMIN — Medication 60 MILLIGRAM(S): at 00:37

## 2020-06-26 RX ADMIN — ENOXAPARIN SODIUM 40 MILLIGRAM(S): 100 INJECTION SUBCUTANEOUS at 21:32

## 2020-06-26 RX ADMIN — FAMOTIDINE 20 MILLIGRAM(S): 10 INJECTION INTRAVENOUS at 11:16

## 2020-06-26 RX ADMIN — Medication 0.5 MILLIGRAM(S): at 11:27

## 2020-06-26 RX ADMIN — ALBUTEROL 2 PUFF(S): 90 AEROSOL, METERED ORAL at 16:12

## 2020-06-26 RX ADMIN — Medication 25 MILLIGRAM(S): at 21:35

## 2020-06-26 NOTE — PROGRESS NOTE ADULT - SUBJECTIVE AND OBJECTIVE BOX
Events Overnight: sliept better overnight, seroquel was increased yesterday, more lucid and cooperative this am                             remains on trach collar    HPI:     66 y/o male w chronic AFib w Watchman device, HFpEF , COPD on home O2, HTN, ETOH use   admitted on 4/18 with COPD exacerbation--RRT called on 4/23 for AMS requiring intubation--extubated on 4/26 then re intubated on 4/29.  S/P Trach and PEG on 5/15    Had mssA bacteremia was treated  is now on trach collar  has been agitation now being treated with seroquel and xanax     ROS cant obtain s/p trach     MEDICATIONS  (STANDING):  ALBUTerol    90 MICROgram(s) HFA Inhaler 2 Puff(s) Inhalation every 4 hours  ALPRAZolam 0.5 milliGRAM(s) Oral every 6 hours  aspirin  chewable 81 milliGRAM(s) Oral daily  budesonide 160 MICROgram(s)/formoterol 4.5 MICROgram(s) Inhaler 2 Puff(s) Inhalation two times a day  clopidogrel Tablet 75 milliGRAM(s) Oral daily  diltiazem    Tablet 60 milliGRAM(s) Oral every 6 hours  doxazosin 2 milliGRAM(s) Oral at bedtime  enoxaparin Injectable 40 milliGRAM(s) SubCutaneous every 12 hours  famotidine    Tablet 20 milliGRAM(s) Oral two times a day  gabapentin   Solution 400 milliGRAM(s) Enteral Tube three times a day  melatonin 5 milliGRAM(s) Oral at bedtime  metoprolol tartrate 25 milliGRAM(s) Oral two times a day  polyethylene glycol 3350 17 Gram(s) Oral daily  QUEtiapine 200 milliGRAM(s) Oral at bedtime  senna 2 Tablet(s) Oral at bedtime  thiamine 100 milliGRAM(s) Oral daily  tiotropium 18 MICROgram(s) Capsule 1 Capsule(s) Inhalation daily    MEDICATIONS  (PRN):  acetaminophen   Tablet .. 650 milliGRAM(s) Oral every 6 hours PRN Temp greater or equal to 38.5C (101.3F)  ibuprofen  Tablet. 400 milliGRAM(s) Oral every 6 hours PRN Moderate Pain (4 - 6)      ICU Vital Signs Last 24 Hrs  T(C): 37.6 (26 Jun 2020 06:00), Max: 37.9 (25 Jun 2020 21:00)  T(F): 99.7 (26 Jun 2020 06:00), Max: 100.2 (25 Jun 2020 21:00)  HR: 90 (26 Jun 2020 08:33) (69 - 144)  BP: 99/65 (26 Jun 2020 07:00) (91/61 - 139/100)  BP(mean): 74 (26 Jun 2020 07:00) (55 - 108)  ABP: --  ABP(mean): --  RR: 25 (26 Jun 2020 07:00) (8 - 32)  SpO2: 96% (26 Jun 2020 08:33) (83% - 100%)      I&O's Summary    25 Jun 2020 07:01  -  26 Jun 2020 07:00  --------------------------------------------------------  IN: 1920 mL / OUT: 0 mL / NET: 1920 mL    Physical Exam:    General - Awake, comfortabel    HEENT nc/at     neck s/p trach, site clean    cv rrr    lungs scattered rhonchi    abdomen s/p pEG soft    ext s/p left leg bka    skin no rashes                           10.1   4.82  )-----------( 253      ( 26 Jun 2020 06:51 )             31.4     06-26    140  |  105  |  19  ----------------------------<  117<H>  3.7   |  32<H>  |  0.59    Ca    9.0      26 Jun 2020 06:51      DVT Prophylaxis:   Lovenox                                                              Advanced Directives: Full Code

## 2020-06-26 NOTE — PROGRESS NOTE ADULT - ASSESSMENT
64 y/o male w PAFib w Watchman device, HFpEF , COPD on home O2, HTN, ETOH use and pt of size admitted on 4/18 with COPD    s/p trach peg  MSSA sepsis and CRP in sputum--Completed IV Ancef (6/5) and completed  treatment.     last cxr clear,  will leave trach uncapped  (has gotten in trouble when capped, will leave until ambulating)  Chronic resp failure  TM Encephalopathy and agitation- seroquel increased   Cont clopidogrel and ASA for CAD and AFib, not on ac given watchman  cardizem inc for afib  TF to goal  HOB > 30  ready for placement when agitation better  failed speech swallow evaluation

## 2020-06-26 NOTE — PROGRESS NOTE ADULT - SUBJECTIVE AND OBJECTIVE BOX
Subjective:    pat awake, agitated on & Off required haldol.    Home Medications:  acetaminophen 325 mg oral tablet: 2 tab(s) orally every 6 hours, As needed, Temp greater or equal to 38.5C (101.3F) (02 Jun 2020 12:53)  albuterol 90 mcg/inh inhalation aerosol: 2 puff(s) inhaled every 4 hours (02 Jun 2020 12:53)  ALPRAZolam 0.5 mg oral tablet: 1 tab(s) orally every 6 hours (02 Jun 2020 12:53)  aspirin 81 mg oral tablet, chewable: 1 tab(s) orally once a day (02 Jun 2020 12:53)  clopidogrel 75 mg oral tablet: 1 tab(s) orally once a day (02 Jun 2020 12:53)  dilTIAZem 90 mg oral tablet: 1 tab(s) orally every 6 hours (02 Jun 2020 12:53)  doxazosin 2 mg oral tablet: 1 tab(s) orally once a day (at bedtime) (02 Jun 2020 12:53)  gabapentin 400 mg oral capsule: 1 cap(s) orally 3 times a day (02 Jun 2020 12:53)  pantoprazole 40 mg oral granule, enteric coated: 40 milligram(s) orally once a day (19 Apr 2020 03:12)  QUEtiapine 100 mg oral tablet: 1 tab(s) orally once a day (at bedtime) (02 Jun 2020 12:53)  QUEtiapine 50 mg oral tablet: 1 tab(s) orally once a day (02 Jun 2020 12:53)  Spiriva 18 mcg inhalation capsule: 1 cap(s) inhaled once a day (19 Apr 2020 03:12)  thiamine 100 mg oral tablet: 1 tab(s) orally once a day (02 Jun 2020 12:53)    MEDICATIONS  (STANDING):  ALBUTerol    90 MICROgram(s) HFA Inhaler 2 Puff(s) Inhalation every 4 hours  ALPRAZolam 0.5 milliGRAM(s) Oral every 6 hours  aspirin  chewable 81 milliGRAM(s) Oral daily  budesonide 160 MICROgram(s)/formoterol 4.5 MICROgram(s) Inhaler 2 Puff(s) Inhalation two times a day  clopidogrel Tablet 75 milliGRAM(s) Oral daily  diltiazem    Tablet 60 milliGRAM(s) Oral every 6 hours  doxazosin 2 milliGRAM(s) Oral at bedtime  enoxaparin Injectable 40 milliGRAM(s) SubCutaneous every 12 hours  famotidine    Tablet 20 milliGRAM(s) Oral two times a day  gabapentin   Solution 400 milliGRAM(s) Enteral Tube three times a day  melatonin 5 milliGRAM(s) Oral at bedtime  metoprolol tartrate 25 milliGRAM(s) Oral two times a day  polyethylene glycol 3350 17 Gram(s) Oral daily  QUEtiapine 200 milliGRAM(s) Oral at bedtime  senna 2 Tablet(s) Oral at bedtime  thiamine 100 milliGRAM(s) Oral daily  tiotropium 18 MICROgram(s) Capsule 1 Capsule(s) Inhalation daily    MEDICATIONS  (PRN):  acetaminophen   Tablet .. 650 milliGRAM(s) Oral every 6 hours PRN Temp greater or equal to 38.5C (101.3F)  ibuprofen  Tablet. 400 milliGRAM(s) Oral every 6 hours PRN Moderate Pain (4 - 6)      Allergies    Ceclor (Rash)  Duricef (Rash)    Intolerances        Vital Signs Last 24 Hrs  T(C): 37.8 (26 Jun 2020 10:00), Max: 37.9 (25 Jun 2020 21:00)  T(F): 100 (26 Jun 2020 10:00), Max: 100.2 (25 Jun 2020 21:00)  HR: 93 (26 Jun 2020 12:47) (69 - 144)  BP: 102/60 (26 Jun 2020 12:00) (91/61 - 146/132)  BP(mean): 136 (26 Jun 2020 09:00) (66 - 136)  RR: 24 (26 Jun 2020 13:00) (8 - 32)  SpO2: 100% (26 Jun 2020 13:00) (67% - 100%)      PHYSICAL EXAMINATION:    NECK:  Supple. No lymphadenopathy. Jugular venous pressure not elevated. Carotids equal.   HEART:   The cardiac impulse has a normal quality. Reg., Nl S1 and S2.  There are no murmurs, rubs or gallops noted  CHEST:  Chest crackles to auscultation. Normal respiratory effort.  ABDOMEN:  Soft and nontender.   EXTREMITIES:  There is no edema.       LABS:                        10.1   4.82  )-----------( 253      ( 26 Jun 2020 06:51 )             31.4     06-26    140  |  105  |  19  ----------------------------<  117<H>  3.7   |  32<H>  |  0.59    Ca    9.0      26 Jun 2020 06:51

## 2020-06-26 NOTE — CHART NOTE - NSCHARTNOTEFT_GEN_A_CORE
Assessment:   *pt admitted on 4/18 with COPD exacerbation--RRT called on 4/23 for AMS requiring intubation--extubated on 4/26 then re intubated on 4/29.  S/P Trach and PEG on 5/15. Had mssA bacteremia was treated. is now on trach collar. has been agitation now being treated with seroquel and xanax.     *labs reviewed; lytes WNL.    *(+1) generalized edema.  BM (+) 6/25.    *SLP eval'd pt on 6/25; rec'd c/w NPO and TF.    *pt TF was changed to bolus on 6/25 2/2 pt grabbing at tube.  pt received 1920mL of TF in past 24hours.  Which provided ~2880Kcal and 122g protein.  Meeting >100% of estimated calorie needs and 100% of estimated protein needs.  rec'd change TF bolus rate to 400mL four times daily (q6hrs) with 150mL free water flush before/after each TF bolus and 1pkt prosource TF daily.  Which will provide ~2440Kcal, 113g protein, 2416mL free water, and total TF volume of 1600mL.    Recommendations:  1) change TF bolus to 400mL four times daily (q6hrs) with 150mL free water flush before/after each TF bolus and 1pkt Prosource TF daily.    2) monitor hydration status; adjust free water flushes prn  3) monitor TF tolerance; keep back of bed > 35 degrees  4) weekly wt checks to track/trend changes      Diet Prescription: Diet, NPO with Tube Feed:   Tube Feeding Modality: Gastrostomy  Jevity 1.5 Randall (JEVITY1.5)  Total Volume for 24 Hours (mL): 1920  Bolus  Total Volume of Bolus (mL):  480  Total # of Feeds: 4  Tube Feed Frequency: Every 6 hours   Tube Feed Start Time: 12:00  Bolus Feed Rate (mL per Hour): 1000   Bolus Feed Duration (in Hours): 0.5  Bolus Feed Instructions:  2 cans four times a day (06-25-20 @ 11:11)      Wt Hx:  110.8Kg (6/17)  117.1Kg (5/9)  117.9Kg (4/18)      06-25-20 @ 07:01  -  06-26-20 @ 07:00  --------------------------------------------------------  IN: 1920 mL / OUT: 0 mL / NET: 1920 mL        Estimated Needs:   2250-2700Kcal (25-30Kcal/Kg of adjust BW; 90Kg)  108-126g protein (1.2-1.4g/Kg adjusted BW: 90kg)  2250-2700mL (25-30mL/Kg of adjusted BW: 90Kg)        Pertinent Labs: 06-26 Na140 mmol/L Glu 117 mg/dL<H> K+ 3.7 mmol/L Cr  0.59 mg/dL BUN 19 mg/dL 06-22 Phos 5.0 mg/dL<H>      Skin: karla score = 14  all PU healed    Monitoring and Evaluation:   [x] PO intake/Nutr support infusion [ x ] Tolerance to Nutr [ x ] weights [ x ] labs[ x ] follow up per protocol  [ ] other:

## 2020-06-26 NOTE — PROGRESS NOTE ADULT - SUBJECTIVE AND OBJECTIVE BOX
Subjective:      Vital Signs:  Vital Signs Last 24 Hrs  T(C): 37.6 (06-26-20 @ 06:00), Max: 37.9 (06-25-20 @ 21:00)  T(F): 99.7 (06-26-20 @ 06:00), Max: 100.2 (06-25-20 @ 21:00)  HR: 88 (06-26-20 @ 09:00) (69 - 144)  BP: 146/132 (06-26-20 @ 09:00) (91/61 - 146/132)  RR: 31 (06-26-20 @ 09:00) (8 - 32)  SpO2: 95% (06-26-20 @ 09:00) (83% - 100%) on (O2)    Telemetry/Alarms:    Relevant labs, radiology and Medications reviewed                        10.1   4.82  )-----------( 253      ( 26 Jun 2020 06:51 )             31.4     06-26    140  |  105  |  19  ----------------------------<  117<H>  3.7   |  32<H>  |  0.59    Ca    9.0      26 Jun 2020 06:51        MEDICATIONS  (STANDING):  ALBUTerol    90 MICROgram(s) HFA Inhaler 2 Puff(s) Inhalation every 4 hours  ALPRAZolam 0.5 milliGRAM(s) Oral every 6 hours  aspirin  chewable 81 milliGRAM(s) Oral daily  budesonide 160 MICROgram(s)/formoterol 4.5 MICROgram(s) Inhaler 2 Puff(s) Inhalation two times a day  clopidogrel Tablet 75 milliGRAM(s) Oral daily  diltiazem    Tablet 60 milliGRAM(s) Oral every 6 hours  doxazosin 2 milliGRAM(s) Oral at bedtime  enoxaparin Injectable 40 milliGRAM(s) SubCutaneous every 12 hours  famotidine    Tablet 20 milliGRAM(s) Oral two times a day  gabapentin   Solution 400 milliGRAM(s) Enteral Tube three times a day  melatonin 5 milliGRAM(s) Oral at bedtime  metoprolol tartrate 25 milliGRAM(s) Oral two times a day  polyethylene glycol 3350 17 Gram(s) Oral daily  QUEtiapine 200 milliGRAM(s) Oral at bedtime  senna 2 Tablet(s) Oral at bedtime  thiamine 100 milliGRAM(s) Oral daily  tiotropium 18 MICROgram(s) Capsule 1 Capsule(s) Inhalation daily    MEDICATIONS  (PRN):  acetaminophen   Tablet .. 650 milliGRAM(s) Oral every 6 hours PRN Temp greater or equal to 38.5C (101.3F)  ibuprofen  Tablet. 400 milliGRAM(s) Oral every 6 hours PRN Moderate Pain (4 - 6)      Physical exam  Gen  Neuro  Card  Pulm  Abd  Ext    Tubes:    I&O's Summary    25 Jun 2020 07:01  -  26 Jun 2020 07:00  --------------------------------------------------------  IN: 1920 mL / OUT: 0 mL / NET: 1920 mL        Assessment  65y Male  w/ PAST MEDICAL & SURGICAL HISTORY:  Chronic CHF  COPD without exacerbation  Atrial fibrillation  Presence of Watchman left atrial appendage closure device  Hypertension  GERD (gastroesophageal reflux disease)  Chronic obstructive pulmonary disease (COPD)  Anemia  Falls  Meningitis  Collapsed lung  Alcohol withdrawal  Emphysema of lung  Cirrhosis  CHF (congestive heart failure)  Poor historian  Alcohol abuse  Chronic atrial fibrillation  Unilateral amputation of lower extremity below knee  Presence of Watchman left atrial appendage closure device  S/P BKA (below knee amputation) unilateral, left  admitted with complaints of Patient is a 65y old  Male who presents with a chief complaint of acute hypoxemic, hypercapneic resp failure  COPD exacerbation (26 Jun 2020 09:32)  .  On (Date), patient underwent Tracheostomy tube change  Bronchoscopy with creation of tracheostomy and insertion of percutaneous endoscopic gastrostomy (PEG) tube  Insertion, nasogastric tube  . Postoperative course/issues:    PLAN  Neuro: Pain management  Pulm: Encourage coughing, deep breathing and use of incentive spirometry. Wean off supplemental oxygen as able. Daily CXR.   Cardio: Monitor telemetry/alarms  GI: Tolerating diet. Continue stool softeners.  Renal: monitor urine output, supplement electrolytes as needed  Vasc: Heparin SC/SCDs for DVT prophylaxis  Heme: Stable H/H. .   ID:   Therapy: OOB/ambulate, PT  Tubes: Monitor Chest tube output    Discussed with Cardiothoracic Team at AM rounds. Subjective:  No acute events overnight.    Vital Signs:  Vital Signs Last 24 Hrs  T(C): 37.6 (06-26-20 @ 06:00), Max: 37.9 (06-25-20 @ 21:00)  T(F): 99.7 (06-26-20 @ 06:00), Max: 100.2 (06-25-20 @ 21:00)  HR: 88 (06-26-20 @ 09:00) (69 - 144)  BP: 146/132 (06-26-20 @ 09:00) (91/61 - 146/132)  RR: 31 (06-26-20 @ 09:00) (8 - 32)  SpO2: 95% (06-26-20 @ 09:00) (83% - 100%) on room air    Telemetry/Alarms: atrial fibrillation    Relevant labs, radiology and Medications reviewed                        10.1   4.82  )-----------( 253      ( 26 Jun 2020 06:51 )             31.4     06-26    140  |  105  |  19  ----------------------------<  117<H>  3.7   |  32<H>  |  0.59    Ca    9.0      26 Jun 2020 06:51        MEDICATIONS  (STANDING):  ALBUTerol    90 MICROgram(s) HFA Inhaler 2 Puff(s) Inhalation every 4 hours  ALPRAZolam 0.5 milliGRAM(s) Oral every 6 hours  aspirin  chewable 81 milliGRAM(s) Oral daily  budesonide 160 MICROgram(s)/formoterol 4.5 MICROgram(s) Inhaler 2 Puff(s) Inhalation two times a day  clopidogrel Tablet 75 milliGRAM(s) Oral daily  diltiazem    Tablet 60 milliGRAM(s) Oral every 6 hours  doxazosin 2 milliGRAM(s) Oral at bedtime  enoxaparin Injectable 40 milliGRAM(s) SubCutaneous every 12 hours  famotidine    Tablet 20 milliGRAM(s) Oral two times a day  gabapentin   Solution 400 milliGRAM(s) Enteral Tube three times a day  melatonin 5 milliGRAM(s) Oral at bedtime  metoprolol tartrate 25 milliGRAM(s) Oral two times a day  polyethylene glycol 3350 17 Gram(s) Oral daily  QUEtiapine 200 milliGRAM(s) Oral at bedtime  senna 2 Tablet(s) Oral at bedtime  thiamine 100 milliGRAM(s) Oral daily  tiotropium 18 MICROgram(s) Capsule 1 Capsule(s) Inhalation daily    MEDICATIONS  (PRN):  acetaminophen   Tablet .. 650 milliGRAM(s) Oral every 6 hours PRN Temp greater or equal to 38.5C (101.3F)  ibuprofen  Tablet. 400 milliGRAM(s) Oral every 6 hours PRN Moderate Pain (4 - 6)      Physical exam  Gen: NAD breathing comfortably  Neuro: AO x 3  Card: S1 S2  Pulm: equal bilaterally  Abd: Soft PEG in place  Ext: no edema      I&O's Summary    25 Jun 2020 07:01  -  26 Jun 2020 07:00  --------------------------------------------------------  IN: 1920 mL / OUT: 0 mL / NET: 1920 mL        Assessment  65y Male  w/ PAST MEDICAL & SURGICAL HISTORY:  Chronic CHF  COPD without exacerbation  Atrial fibrillation  Presence of Watchman left atrial appendage closure device  Hypertension  GERD (gastroesophageal reflux disease)  Chronic obstructive pulmonary disease (COPD)  Anemia  Falls  Meningitis  Collapsed lung  Alcohol withdrawal  Emphysema of lung  Cirrhosis  CHF (congestive heart failure)  Poor historian  Alcohol abuse  Chronic atrial fibrillation  Unilateral amputation of lower extremity below knee  Presence of Watchman left atrial appendage closure device  S/P BKA (below knee amputation) unilateral, left    Patient is a 65y old  Male who presents with a chief complaint of acute hypoxemic, hypercapneic resp failure  COPD exacerbation (26 Jun 2020 09:32)      1.  Chronic respiratory failure  2.  COPD exacerbation  3.  Pneumonia  4.  Leukocytosis and fevers - now resolved    Continue capping trach as tolerated.  Continue diet.  Would not decannulate this hospital admission.  May follow up as outpatient with Dr. Morris for trach removal.  Discharge planning.    Discussed with Cardiothoracic Team at AM rounds.

## 2020-06-27 DIAGNOSIS — F39 UNSPECIFIED MOOD [AFFECTIVE] DISORDER: ICD-10-CM

## 2020-06-27 PROCEDURE — 99232 SBSQ HOSP IP/OBS MODERATE 35: CPT

## 2020-06-27 RX ORDER — HALOPERIDOL DECANOATE 100 MG/ML
1 INJECTION INTRAMUSCULAR ONCE
Refills: 0 | Status: COMPLETED | OUTPATIENT
Start: 2020-06-27 | End: 2020-06-27

## 2020-06-27 RX ORDER — OLANZAPINE 15 MG/1
5 TABLET, FILM COATED ORAL DAILY
Refills: 0 | Status: DISCONTINUED | OUTPATIENT
Start: 2020-06-27 | End: 2020-06-27

## 2020-06-27 RX ORDER — CLONAZEPAM 1 MG
1 TABLET ORAL
Refills: 0 | Status: DISCONTINUED | OUTPATIENT
Start: 2020-06-27 | End: 2020-06-29

## 2020-06-27 RX ORDER — QUETIAPINE FUMARATE 200 MG/1
100 TABLET, FILM COATED ORAL
Refills: 0 | Status: DISCONTINUED | OUTPATIENT
Start: 2020-06-27 | End: 2020-06-29

## 2020-06-27 RX ORDER — QUETIAPINE FUMARATE 200 MG/1
50 TABLET, FILM COATED ORAL
Refills: 0 | Status: DISCONTINUED | OUTPATIENT
Start: 2020-06-27 | End: 2020-07-03

## 2020-06-27 RX ADMIN — GABAPENTIN 400 MILLIGRAM(S): 400 CAPSULE ORAL at 05:54

## 2020-06-27 RX ADMIN — Medication 5 MILLIGRAM(S): at 22:58

## 2020-06-27 RX ADMIN — ENOXAPARIN SODIUM 40 MILLIGRAM(S): 100 INJECTION SUBCUTANEOUS at 09:52

## 2020-06-27 RX ADMIN — Medication 1 MILLIGRAM(S): at 09:53

## 2020-06-27 RX ADMIN — Medication 25 MILLIGRAM(S): at 09:53

## 2020-06-27 RX ADMIN — QUETIAPINE FUMARATE 50 MILLIGRAM(S): 200 TABLET, FILM COATED ORAL at 17:13

## 2020-06-27 RX ADMIN — Medication 60 MILLIGRAM(S): at 12:34

## 2020-06-27 RX ADMIN — BUDESONIDE AND FORMOTEROL FUMARATE DIHYDRATE 2 PUFF(S): 160; 4.5 AEROSOL RESPIRATORY (INHALATION) at 09:40

## 2020-06-27 RX ADMIN — Medication 60 MILLIGRAM(S): at 17:13

## 2020-06-27 RX ADMIN — SENNA PLUS 2 TABLET(S): 8.6 TABLET ORAL at 22:58

## 2020-06-27 RX ADMIN — TIOTROPIUM BROMIDE 1 CAPSULE(S): 18 CAPSULE ORAL; RESPIRATORY (INHALATION) at 09:33

## 2020-06-27 RX ADMIN — GABAPENTIN 400 MILLIGRAM(S): 400 CAPSULE ORAL at 22:58

## 2020-06-27 RX ADMIN — Medication 0.5 MILLIGRAM(S): at 05:53

## 2020-06-27 RX ADMIN — POLYETHYLENE GLYCOL 3350 17 GRAM(S): 17 POWDER, FOR SOLUTION ORAL at 09:52

## 2020-06-27 RX ADMIN — Medication 60 MILLIGRAM(S): at 05:53

## 2020-06-27 RX ADMIN — QUETIAPINE FUMARATE 100 MILLIGRAM(S): 200 TABLET, FILM COATED ORAL at 09:54

## 2020-06-27 RX ADMIN — Medication 81 MILLIGRAM(S): at 09:53

## 2020-06-27 RX ADMIN — Medication 60 MILLIGRAM(S): at 23:03

## 2020-06-27 RX ADMIN — Medication 1 MILLIGRAM(S): at 23:02

## 2020-06-27 RX ADMIN — CLOPIDOGREL BISULFATE 75 MILLIGRAM(S): 75 TABLET, FILM COATED ORAL at 09:53

## 2020-06-27 RX ADMIN — Medication 25 MILLIGRAM(S): at 23:01

## 2020-06-27 RX ADMIN — ENOXAPARIN SODIUM 40 MILLIGRAM(S): 100 INJECTION SUBCUTANEOUS at 23:02

## 2020-06-27 RX ADMIN — GABAPENTIN 400 MILLIGRAM(S): 400 CAPSULE ORAL at 15:21

## 2020-06-27 RX ADMIN — Medication 100 MILLIGRAM(S): at 09:53

## 2020-06-27 RX ADMIN — FAMOTIDINE 20 MILLIGRAM(S): 10 INJECTION INTRAVENOUS at 22:58

## 2020-06-27 RX ADMIN — HALOPERIDOL DECANOATE 1 MILLIGRAM(S): 100 INJECTION INTRAMUSCULAR at 00:25

## 2020-06-27 RX ADMIN — ALBUTEROL 2 PUFF(S): 90 AEROSOL, METERED ORAL at 15:50

## 2020-06-27 RX ADMIN — QUETIAPINE FUMARATE 200 MILLIGRAM(S): 200 TABLET, FILM COATED ORAL at 23:11

## 2020-06-27 RX ADMIN — FAMOTIDINE 20 MILLIGRAM(S): 10 INJECTION INTRAVENOUS at 09:53

## 2020-06-27 RX ADMIN — ALBUTEROL 2 PUFF(S): 90 AEROSOL, METERED ORAL at 09:40

## 2020-06-27 RX ADMIN — ALBUTEROL 2 PUFF(S): 90 AEROSOL, METERED ORAL at 12:14

## 2020-06-27 RX ADMIN — Medication 2 MILLIGRAM(S): at 22:58

## 2020-06-27 NOTE — BEHAVIORAL HEALTH ASSESSMENT NOTE - NSBHCHARTREVIEWVS_PSY_A_CORE FT
Vital Signs Last 24 Hrs  T(C): 36 (27 Jun 2020 06:00), Max: 37.8 (26 Jun 2020 10:00)  T(F): 96.8 (27 Jun 2020 06:00), Max: 100 (26 Jun 2020 10:00)  HR: 82 (27 Jun 2020 09:36) (72 - 102)  BP: 90/56 (27 Jun 2020 09:00) (87/58 - 138/71)  BP(mean): 64 (27 Jun 2020 09:00) (44 - 88)  RR: 26 (27 Jun 2020 09:00) (17 - 32)  SpO2: 95% (27 Jun 2020 09:36) (67% - 100%)

## 2020-06-27 NOTE — BEHAVIORAL HEALTH ASSESSMENT NOTE - NSBHCHARTREVIEWLAB_PSY_A_CORE FT
10.1   4.82  )-----------( 253      ( 26 Jun 2020 06:51 )             31.4       06-26    140  |  105  |  19  ----------------------------<  117<H>  3.7   |  32<H>  |  0.59    Ca    9.0      26 Jun 2020 06:51                        Lactate Trend            CAPILLARY BLOOD GLUCOSE            Culture Results:   No growth (06-16 @ 08:50)  Culture Results:   Moderate Pseudomonas aeruginosa (Carbapenem Resistant)  Moderate Proteus mirabilis  Normal Respiratory Faith absent (06-15 @ 14:15)  Culture Results:   No Growth Final (06-15 @ 13:29)  Culture Results:   No Growth Final (06-15 @ 13:28)  Culture Results:   No Growth Final (05-29 @ 06:49)  Culture Results:   No Growth Final (05-29 @ 06:45)

## 2020-06-27 NOTE — BEHAVIORAL HEALTH ASSESSMENT NOTE - HPI (INCLUDE ILLNESS QUALITY, SEVERITY, DURATION, TIMING, CONTEXT, MODIFYING FACTORS, ASSOCIATED SIGNS AND SYMPTOMS)
65 year-old  male, admitted for CHF, COPD exacerbation and rapid A-fib.    Patient has been irritable, labile, with mood dysregulation in ICU, with agitation. Patient presented calm and cooperative; simple in responses. Patient denying manic / psychotic symptoms. Denies suicidal ideation or homicidal ideation. Discussed with Dr. Herrera who reports patient needs more medication management to mitigate behavioral disturbances.

## 2020-06-27 NOTE — PROGRESS NOTE ADULT - SUBJECTIVE AND OBJECTIVE BOX
Subjective:    pat trach with RA, lying in bed, sleeping.     Home Medications:  acetaminophen 325 mg oral tablet: 2 tab(s) orally every 6 hours, As needed, Temp greater or equal to 38.5C (101.3F) (02 Jun 2020 12:53)  albuterol 90 mcg/inh inhalation aerosol: 2 puff(s) inhaled every 4 hours (02 Jun 2020 12:53)  ALPRAZolam 0.5 mg oral tablet: 1 tab(s) orally every 6 hours (02 Jun 2020 12:53)  aspirin 81 mg oral tablet, chewable: 1 tab(s) orally once a day (02 Jun 2020 12:53)  clopidogrel 75 mg oral tablet: 1 tab(s) orally once a day (02 Jun 2020 12:53)  dilTIAZem 90 mg oral tablet: 1 tab(s) orally every 6 hours (02 Jun 2020 12:53)  doxazosin 2 mg oral tablet: 1 tab(s) orally once a day (at bedtime) (02 Jun 2020 12:53)  gabapentin 400 mg oral capsule: 1 cap(s) orally 3 times a day (02 Jun 2020 12:53)  pantoprazole 40 mg oral granule, enteric coated: 40 milligram(s) orally once a day (19 Apr 2020 03:12)  QUEtiapine 100 mg oral tablet: 1 tab(s) orally once a day (at bedtime) (02 Jun 2020 12:53)  QUEtiapine 50 mg oral tablet: 1 tab(s) orally once a day (02 Jun 2020 12:53)  Spiriva 18 mcg inhalation capsule: 1 cap(s) inhaled once a day (19 Apr 2020 03:12)  thiamine 100 mg oral tablet: 1 tab(s) orally once a day (02 Jun 2020 12:53)    MEDICATIONS  (STANDING):  ALBUTerol    90 MICROgram(s) HFA Inhaler 2 Puff(s) Inhalation every 4 hours  aspirin  chewable 81 milliGRAM(s) Oral daily  budesonide 160 MICROgram(s)/formoterol 4.5 MICROgram(s) Inhaler 2 Puff(s) Inhalation two times a day  clonazePAM  Tablet 1 milliGRAM(s) Oral two times a day  clopidogrel Tablet 75 milliGRAM(s) Oral daily  diltiazem    Tablet 60 milliGRAM(s) Oral every 6 hours  doxazosin 2 milliGRAM(s) Oral at bedtime  enoxaparin Injectable 40 milliGRAM(s) SubCutaneous every 12 hours  famotidine    Tablet 20 milliGRAM(s) Oral two times a day  gabapentin   Solution 400 milliGRAM(s) Enteral Tube three times a day  melatonin 5 milliGRAM(s) Oral at bedtime  metoprolol tartrate 25 milliGRAM(s) Oral two times a day  polyethylene glycol 3350 17 Gram(s) Oral daily  QUEtiapine 100 milliGRAM(s) Oral <User Schedule>  QUEtiapine 50 milliGRAM(s) Oral <User Schedule>  QUEtiapine 200 milliGRAM(s) Oral at bedtime  senna 2 Tablet(s) Oral at bedtime  thiamine 100 milliGRAM(s) Oral daily  tiotropium 18 MICROgram(s) Capsule 1 Capsule(s) Inhalation daily    MEDICATIONS  (PRN):  acetaminophen   Tablet .. 650 milliGRAM(s) Oral every 6 hours PRN Temp greater or equal to 38.5C (101.3F)  ibuprofen  Tablet. 400 milliGRAM(s) Oral every 6 hours PRN Moderate Pain (4 - 6)      Allergies    Ceclor (Rash)  Duricef (Rash)    Intolerances        Vital Signs Last 24 Hrs  T(C): 36 (27 Jun 2020 06:00), Max: 37.7 (26 Jun 2020 14:00)  T(F): 96.8 (27 Jun 2020 06:00), Max: 99.9 (26 Jun 2020 14:00)  HR: 82 (27 Jun 2020 10:00) (72 - 102)  BP: 90/53 (27 Jun 2020 10:00) (87/58 - 138/71)  BP(mean): 58 (27 Jun 2020 10:00) (44 - 88)  RR: 18 (27 Jun 2020 10:00) (17 - 32)  SpO2: 98% (27 Jun 2020 10:00) (77% - 100%)      PHYSICAL EXAMINATION:    NECK:  Supple. No lymphadenopathy. Jugular venous pressure not elevated. Carotids equal.   HEART:   The cardiac impulse has a normal quality. Reg., Nl S1 and S2.  There are no murmurs, rubs or gallops noted  CHEST:  Chest crackles to auscultation. Normal respiratory effort.  ABDOMEN:  Soft and nontender.   EXTREMITIES:  There is no edema.       LABS:                        10.1   4.82  )-----------( 253      ( 26 Jun 2020 06:51 )             31.4     06-26    140  |  105  |  19  ----------------------------<  117<H>  3.7   |  32<H>  |  0.59    Ca    9.0      26 Jun 2020 06:51

## 2020-06-27 NOTE — PROGRESS NOTE ADULT - ASSESSMENT
66 y/o male w PAFib w Watchman device, HFpEF , COPD on home O2, HTN, ETOH use and pt of size admitted on 4/18 with COPD    s/p trach peg  MSSA sepsis and CRP in sputum--Completed IV Ancef (6/5) and completed  treatment.     last cxr clear,  will leave trach uncapped  (has gotten in trouble when capped, will leave until ambulating)  Chronic resp failure  TM Encephalopathy and agitation- on seroquel and xanax , will try adding zyprexa   Cont clopidogrel and ASA for CAD and AFib, not on ac given watchman  cardizem inc for afib rate control ok  TF to goal  HOB > 30  ready for placement when agitation better  will have psycnh consult for help with agitation

## 2020-06-27 NOTE — BEHAVIORAL HEALTH ASSESSMENT NOTE - SUMMARY
65 year-old  male, admitted for CHF, COPD exacerbation and rapid A-fib.    Patient has behavioral disturbance in the setting of mood dysregulation which is likely of metabolic origin in the setting of medical comorbidities and post ventilation. Delirium is most likely. Patient to benefit from continual medication management. Patient however not in need for in-patient psychiatric hospitalization.    PLAN  1. Seroquel 100 mg at 8AM - 50 mg at 4PM and 200 mg at bedtime.  2. Klonopin 1 mg twice daily: discontinued Xanax

## 2020-06-27 NOTE — PROGRESS NOTE ADULT - SUBJECTIVE AND OBJECTIVE BOX
Events Overnight: still agitated at times , on  seroquel at night    HPI:     66 y/o male w chronic AFib w Watchman device, HFpEF , COPD on home O2, HTN, ETOH use   admitted on 4/18 with COPD exacerbation--RRT called on 4/23 for AMS requiring intubation--extubated on 4/26 then re intubated on 4/29.  S/P Trach and PEG on 5/15    Had mssA bacteremia was treated, resistent pseudomonas in sputum  is now on trach collar  has been agitation now being treated with seroquel and xanax still agitated     ROS cant obtain secondary to trach       MEDICATIONS  (STANDING):  ALBUTerol    90 MICROgram(s) HFA Inhaler 2 Puff(s) Inhalation every 4 hours  ALPRAZolam 0.5 milliGRAM(s) Oral every 6 hours  aspirin  chewable 81 milliGRAM(s) Oral daily  budesonide 160 MICROgram(s)/formoterol 4.5 MICROgram(s) Inhaler 2 Puff(s) Inhalation two times a day  clopidogrel Tablet 75 milliGRAM(s) Oral daily  diltiazem    Tablet 60 milliGRAM(s) Oral every 6 hours  doxazosin 2 milliGRAM(s) Oral at bedtime  enoxaparin Injectable 40 milliGRAM(s) SubCutaneous every 12 hours  famotidine    Tablet 20 milliGRAM(s) Oral two times a day  gabapentin   Solution 400 milliGRAM(s) Enteral Tube three times a day  melatonin 5 milliGRAM(s) Oral at bedtime  metoprolol tartrate 25 milliGRAM(s) Oral two times a day  polyethylene glycol 3350 17 Gram(s) Oral daily  QUEtiapine 200 milliGRAM(s) Oral at bedtime  senna 2 Tablet(s) Oral at bedtime  thiamine 100 milliGRAM(s) Oral daily  tiotropium 18 MICROgram(s) Capsule 1 Capsule(s) Inhalation daily    MEDICATIONS  (PRN):  acetaminophen   Tablet .. 650 milliGRAM(s) Oral every 6 hours PRN Temp greater or equal to 38.5C (101.3F)  ibuprofen  Tablet. 400 milliGRAM(s) Oral every 6 hours PRN Moderate Pain (4 - 6)      ICU Vital Signs Last 24 Hrs  T(C): 36 (27 Jun 2020 06:00), Max: 37.8 (26 Jun 2020 10:00)  T(F): 96.8 (27 Jun 2020 06:00), Max: 100 (26 Jun 2020 10:00)  HR: 90 (27 Jun 2020 08:00) (72 - 102)  BP: 106/59 (27 Jun 2020 08:00) (87/58 - 146/132)  BP(mean): 71 (27 Jun 2020 08:00) (44 - 136)  ABP: --  ABP(mean): --  RR: 24 (27 Jun 2020 08:00) (17 - 32)  SpO2: 93% (27 Jun 2020 08:00) (67% - 100%)      I&O's Summary    26 Jun 2020 07:01  -  27 Jun 2020 07:00  --------------------------------------------------------  IN: 1770 mL / OUT: 0 mL / NET: 1770 mL        Physical Exam:                               10.1   4.82  )-----------( 253      ( 26 Jun 2020 06:51 )             31.4       06-26    140  |  105  |  19  ----------------------------<  117<H>  3.7   |  32<H>  |  0.59    Ca    9.0      26 Jun 2020 06:51      DVT Prophylaxis:    Lovenox                                                             Advanced Directives: Full Code

## 2020-06-27 NOTE — BEHAVIORAL HEALTH ASSESSMENT NOTE - OTHER
deferred did not ambulate - in bed. Patient has trach Patient has trach simple impoverished unable to assess mood dysregulation acute exacerbation or CHF / COPD

## 2020-06-28 PROCEDURE — 99232 SBSQ HOSP IP/OBS MODERATE 35: CPT

## 2020-06-28 RX ORDER — HALOPERIDOL DECANOATE 100 MG/ML
5 INJECTION INTRAMUSCULAR ONCE
Refills: 0 | Status: COMPLETED | OUTPATIENT
Start: 2020-06-28 | End: 2020-06-28

## 2020-06-28 RX ADMIN — ALBUTEROL 2 PUFF(S): 90 AEROSOL, METERED ORAL at 08:30

## 2020-06-28 RX ADMIN — Medication 60 MILLIGRAM(S): at 13:00

## 2020-06-28 RX ADMIN — ALBUTEROL 2 PUFF(S): 90 AEROSOL, METERED ORAL at 12:56

## 2020-06-28 RX ADMIN — ALBUTEROL 2 PUFF(S): 90 AEROSOL, METERED ORAL at 12:55

## 2020-06-28 RX ADMIN — Medication 60 MILLIGRAM(S): at 06:53

## 2020-06-28 RX ADMIN — Medication 100 MILLIGRAM(S): at 12:59

## 2020-06-28 RX ADMIN — Medication 81 MILLIGRAM(S): at 13:00

## 2020-06-28 RX ADMIN — HALOPERIDOL DECANOATE 5 MILLIGRAM(S): 100 INJECTION INTRAMUSCULAR at 23:00

## 2020-06-28 RX ADMIN — Medication 1 MILLIGRAM(S): at 22:04

## 2020-06-28 RX ADMIN — GABAPENTIN 400 MILLIGRAM(S): 400 CAPSULE ORAL at 06:54

## 2020-06-28 RX ADMIN — FAMOTIDINE 20 MILLIGRAM(S): 10 INJECTION INTRAVENOUS at 12:59

## 2020-06-28 RX ADMIN — ENOXAPARIN SODIUM 40 MILLIGRAM(S): 100 INJECTION SUBCUTANEOUS at 13:01

## 2020-06-28 RX ADMIN — CLOPIDOGREL BISULFATE 75 MILLIGRAM(S): 75 TABLET, FILM COATED ORAL at 13:00

## 2020-06-28 RX ADMIN — Medication 2 MILLIGRAM(S): at 22:04

## 2020-06-28 RX ADMIN — QUETIAPINE FUMARATE 200 MILLIGRAM(S): 200 TABLET, FILM COATED ORAL at 22:04

## 2020-06-28 RX ADMIN — Medication 60 MILLIGRAM(S): at 18:43

## 2020-06-28 RX ADMIN — Medication 25 MILLIGRAM(S): at 22:04

## 2020-06-28 RX ADMIN — Medication 5 MILLIGRAM(S): at 22:04

## 2020-06-28 RX ADMIN — GABAPENTIN 400 MILLIGRAM(S): 400 CAPSULE ORAL at 22:04

## 2020-06-28 RX ADMIN — BUDESONIDE AND FORMOTEROL FUMARATE DIHYDRATE 2 PUFF(S): 160; 4.5 AEROSOL RESPIRATORY (INHALATION) at 08:30

## 2020-06-28 RX ADMIN — SENNA PLUS 2 TABLET(S): 8.6 TABLET ORAL at 22:04

## 2020-06-28 RX ADMIN — ENOXAPARIN SODIUM 40 MILLIGRAM(S): 100 INJECTION SUBCUTANEOUS at 22:04

## 2020-06-28 RX ADMIN — FAMOTIDINE 20 MILLIGRAM(S): 10 INJECTION INTRAVENOUS at 22:04

## 2020-06-28 NOTE — PROGRESS NOTE ADULT - MINUTES
30
35
45
45
40
30
25
35

## 2020-06-28 NOTE — PROGRESS NOTE ADULT - ASSESSMENT
64 y/o male w PAFib w Watchman device, HFpEF , COPD on home O2, HTN, ETOH use and pt of size admitted on 4/18 with COPD    s/p trach peg  MSSA sepsis and CRP in sputum--Completed IV Ancef (6/5) and completed  treatment.     last cxr clear,  will leave trach uncapped  (has gotten in trouble when capped, will leave until ambulating)   TM Encephalopathy and agitation- on seroquel  psych input appreciated started on klonopin and inc seroquel calmer this am   Cont clopidogrel and ASA for CAD and AFib, not on ac given watchman  cardizem inc for afib rate control ok  TF to goal  HOB > 30  ready for placement when agitation better

## 2020-06-28 NOTE — PROGRESS NOTE ADULT - SUBJECTIVE AND OBJECTIVE BOX
Subjective:    pat on trach collar, no respiratory distress.    Home Medications:  acetaminophen 325 mg oral tablet: 2 tab(s) orally every 6 hours, As needed, Temp greater or equal to 38.5C (101.3F) (02 Jun 2020 12:53)  albuterol 90 mcg/inh inhalation aerosol: 2 puff(s) inhaled every 4 hours (02 Jun 2020 12:53)  ALPRAZolam 0.5 mg oral tablet: 1 tab(s) orally every 6 hours (02 Jun 2020 12:53)  aspirin 81 mg oral tablet, chewable: 1 tab(s) orally once a day (02 Jun 2020 12:53)  clopidogrel 75 mg oral tablet: 1 tab(s) orally once a day (02 Jun 2020 12:53)  dilTIAZem 90 mg oral tablet: 1 tab(s) orally every 6 hours (02 Jun 2020 12:53)  doxazosin 2 mg oral tablet: 1 tab(s) orally once a day (at bedtime) (02 Jun 2020 12:53)  gabapentin 400 mg oral capsule: 1 cap(s) orally 3 times a day (02 Jun 2020 12:53)  pantoprazole 40 mg oral granule, enteric coated: 40 milligram(s) orally once a day (19 Apr 2020 03:12)  QUEtiapine 100 mg oral tablet: 1 tab(s) orally once a day (at bedtime) (02 Jun 2020 12:53)  QUEtiapine 50 mg oral tablet: 1 tab(s) orally once a day (02 Jun 2020 12:53)  Spiriva 18 mcg inhalation capsule: 1 cap(s) inhaled once a day (19 Apr 2020 03:12)  thiamine 100 mg oral tablet: 1 tab(s) orally once a day (02 Jun 2020 12:53)    MEDICATIONS  (STANDING):  ALBUTerol    90 MICROgram(s) HFA Inhaler 2 Puff(s) Inhalation every 4 hours  aspirin  chewable 81 milliGRAM(s) Oral daily  budesonide 160 MICROgram(s)/formoterol 4.5 MICROgram(s) Inhaler 2 Puff(s) Inhalation two times a day  clonazePAM  Tablet 1 milliGRAM(s) Oral two times a day  clopidogrel Tablet 75 milliGRAM(s) Oral daily  diltiazem    Tablet 60 milliGRAM(s) Oral every 6 hours  doxazosin 2 milliGRAM(s) Oral at bedtime  enoxaparin Injectable 40 milliGRAM(s) SubCutaneous every 12 hours  famotidine    Tablet 20 milliGRAM(s) Oral two times a day  gabapentin   Solution 400 milliGRAM(s) Enteral Tube three times a day  melatonin 5 milliGRAM(s) Oral at bedtime  metoprolol tartrate 25 milliGRAM(s) Oral two times a day  polyethylene glycol 3350 17 Gram(s) Oral daily  QUEtiapine 100 milliGRAM(s) Oral <User Schedule>  QUEtiapine 50 milliGRAM(s) Oral <User Schedule>  QUEtiapine 200 milliGRAM(s) Oral at bedtime  senna 2 Tablet(s) Oral at bedtime  thiamine 100 milliGRAM(s) Oral daily  tiotropium 18 MICROgram(s) Capsule 1 Capsule(s) Inhalation daily    MEDICATIONS  (PRN):  acetaminophen   Tablet .. 650 milliGRAM(s) Oral every 6 hours PRN Temp greater or equal to 38.5C (101.3F)  ibuprofen  Tablet. 400 milliGRAM(s) Oral every 6 hours PRN Moderate Pain (4 - 6)      Allergies    Ceclor (Rash)  Duricef (Rash)    Intolerances        Vital Signs Last 24 Hrs  T(C): 36.6 (28 Jun 2020 05:00), Max: 37.1 (27 Jun 2020 22:00)  T(F): 97.9 (28 Jun 2020 05:00), Max: 98.7 (27 Jun 2020 22:00)  HR: 100 (28 Jun 2020 12:55) (65 - 100)  BP: 122/68 (28 Jun 2020 12:00) (82/51 - 141/91)  BP(mean): 81 (28 Jun 2020 12:00) (59 - 104)  RR: 28 (28 Jun 2020 12:00) (13 - 32)  SpO2: 98% (28 Jun 2020 12:55) (87% - 99%)      PHYSICAL EXAMINATION:    NECK:  Supple. No lymphadenopathy. Jugular venous pressure not elevated. Carotids equal.   HEART:   The cardiac impulse has a normal quality. Reg., Nl S1 and S2.  There are no murmurs, rubs or gallops noted  CHEST:  Chest crackles to auscultation. Normal respiratory effort.  ABDOMEN:  Soft and nontender.   EXTREMITIES:  There is no edema.       LABS:

## 2020-06-28 NOTE — PROGRESS NOTE ADULT - SUBJECTIVE AND OBJECTIVE BOX
Events Overnight;  Psych input apprecited for agitation, restraints off now on Klonipime and increased dose of seroquel    HPI:       66 y/o male w chronic AFib w Watchman device, HFpEF , COPD on home O2, HTN, ETOH use   admitted on 4/18 with COPD exacerbation--RRT called on 4/23 for AMS requiring intubation--extubated on 4/26 then re intubated on 4/29.  S/P Trach and PEG on 5/15    Had mssA bacteremia was treated, resistent pseudomonas in sputum  is now on trach collar  has been agitation now being treated with seroquel and xanax still agitated     ROS cant obtain secondary to trach     MEDICATIONS  (STANDING):  ALBUTerol    90 MICROgram(s) HFA Inhaler 2 Puff(s) Inhalation every 4 hours  aspirin  chewable 81 milliGRAM(s) Oral daily  budesonide 160 MICROgram(s)/formoterol 4.5 MICROgram(s) Inhaler 2 Puff(s) Inhalation two times a day  clonazePAM  Tablet 1 milliGRAM(s) Oral two times a day  clopidogrel Tablet 75 milliGRAM(s) Oral daily  diltiazem    Tablet 60 milliGRAM(s) Oral every 6 hours  doxazosin 2 milliGRAM(s) Oral at bedtime  enoxaparin Injectable 40 milliGRAM(s) SubCutaneous every 12 hours  famotidine    Tablet 20 milliGRAM(s) Oral two times a day  gabapentin   Solution 400 milliGRAM(s) Enteral Tube three times a day  melatonin 5 milliGRAM(s) Oral at bedtime  metoprolol tartrate 25 milliGRAM(s) Oral two times a day  polyethylene glycol 3350 17 Gram(s) Oral daily  QUEtiapine 100 milliGRAM(s) Oral <User Schedule>  QUEtiapine 50 milliGRAM(s) Oral <User Schedule>  QUEtiapine 200 milliGRAM(s) Oral at bedtime  senna 2 Tablet(s) Oral at bedtime  thiamine 100 milliGRAM(s) Oral daily  tiotropium 18 MICROgram(s) Capsule 1 Capsule(s) Inhalation daily    MEDICATIONS  (PRN):  acetaminophen   Tablet .. 650 milliGRAM(s) Oral every 6 hours PRN Temp greater or equal to 38.5C (101.3F)  ibuprofen  Tablet. 400 milliGRAM(s) Oral every 6 hours PRN Moderate Pain (4 - 6)      ICU Vital Signs Last 24 Hrs  T(C): 36.6 (28 Jun 2020 05:00), Max: 37.1 (27 Jun 2020 22:00)  T(F): 97.9 (28 Jun 2020 05:00), Max: 98.7 (27 Jun 2020 22:00)  HR: 85 (28 Jun 2020 08:30) (65 - 97)  BP: 114/54 (28 Jun 2020 08:00) (82/51 - 141/91)  BP(mean): 69 (28 Jun 2020 08:00) (59 - 104)  ABP: --  ABP(mean): --  RR: 27 (28 Jun 2020 08:00) (13 - 32)  SpO2: 96% (28 Jun 2020 08:30) (87% - 99%)  I&O's Summary    27 Jun 2020 07:01  -  28 Jun 2020 07:00  --------------------------------------------------------  IN: 2800 mL / OUT: 0 mL / NET: 2800 mL      Physical Exam:   · Constitutional	detailed exam	  · Constitutional Comments	agitated at times no respiratory distress	  · Eyes	EOMI; PERRL; no drainage or redness	  · Neck	detailed exam	  · Neck Details	s/p trach, site clean	  · Respiratory	detailed exam	  · Respiratory Comments	trach, some secretions	  · Cardiovascular	Regular rate & rhythm, normal S1, S2; no murmurs, gallops or rubs; no S3, S4	  · Gastrointestinal	detailed exam	  · Gastrointestinal Comments	s/p PEG soft non tender	  · Extremities	detailed exam	  · Extremities Comments	s/p left BKA	  · Neurological	detailed exam	  · Mental Status	conversant at times, agitated and confused at times more appropriate wthis am	    DVT Prophylaxis:   DVT Prophylaxis                                                              Advanced Directives: Full Code

## 2020-06-29 LAB
ANION GAP SERPL CALC-SCNC: 5 MMOL/L — SIGNIFICANT CHANGE UP (ref 5–17)
BUN SERPL-MCNC: 20 MG/DL — SIGNIFICANT CHANGE UP (ref 7–23)
CALCIUM SERPL-MCNC: 9 MG/DL — SIGNIFICANT CHANGE UP (ref 8.5–10.1)
CHLORIDE SERPL-SCNC: 105 MMOL/L — SIGNIFICANT CHANGE UP (ref 96–108)
CO2 SERPL-SCNC: 30 MMOL/L — SIGNIFICANT CHANGE UP (ref 22–31)
CREAT SERPL-MCNC: 0.56 MG/DL — SIGNIFICANT CHANGE UP (ref 0.5–1.3)
GLUCOSE SERPL-MCNC: 94 MG/DL — SIGNIFICANT CHANGE UP (ref 70–99)
HCT VFR BLD CALC: 33.9 % — LOW (ref 39–50)
HGB BLD-MCNC: 10.5 G/DL — LOW (ref 13–17)
MCHC RBC-ENTMCNC: 29.9 PG — SIGNIFICANT CHANGE UP (ref 27–34)
MCHC RBC-ENTMCNC: 31 GM/DL — LOW (ref 32–36)
MCV RBC AUTO: 96.6 FL — SIGNIFICANT CHANGE UP (ref 80–100)
PLATELET # BLD AUTO: 215 K/UL — SIGNIFICANT CHANGE UP (ref 150–400)
POTASSIUM SERPL-MCNC: 3.9 MMOL/L — SIGNIFICANT CHANGE UP (ref 3.5–5.3)
POTASSIUM SERPL-SCNC: 3.9 MMOL/L — SIGNIFICANT CHANGE UP (ref 3.5–5.3)
RBC # BLD: 3.51 M/UL — LOW (ref 4.2–5.8)
RBC # FLD: 14.9 % — HIGH (ref 10.3–14.5)
SODIUM SERPL-SCNC: 140 MMOL/L — SIGNIFICANT CHANGE UP (ref 135–145)
WBC # BLD: 5.35 K/UL — SIGNIFICANT CHANGE UP (ref 3.8–10.5)
WBC # FLD AUTO: 5.35 K/UL — SIGNIFICANT CHANGE UP (ref 3.8–10.5)

## 2020-06-29 PROCEDURE — 99232 SBSQ HOSP IP/OBS MODERATE 35: CPT

## 2020-06-29 RX ORDER — QUETIAPINE FUMARATE 200 MG/1
50 TABLET, FILM COATED ORAL
Refills: 0 | Status: DISCONTINUED | OUTPATIENT
Start: 2020-06-30 | End: 2020-07-03

## 2020-06-29 RX ORDER — CLONAZEPAM 1 MG
0.5 TABLET ORAL
Refills: 0 | Status: DISCONTINUED | OUTPATIENT
Start: 2020-06-29 | End: 2020-07-06

## 2020-06-29 RX ADMIN — Medication 2 MILLIGRAM(S): at 23:13

## 2020-06-29 RX ADMIN — ENOXAPARIN SODIUM 40 MILLIGRAM(S): 100 INJECTION SUBCUTANEOUS at 11:24

## 2020-06-29 RX ADMIN — ENOXAPARIN SODIUM 40 MILLIGRAM(S): 100 INJECTION SUBCUTANEOUS at 23:12

## 2020-06-29 RX ADMIN — Medication 25 MILLIGRAM(S): at 23:13

## 2020-06-29 RX ADMIN — SENNA PLUS 2 TABLET(S): 8.6 TABLET ORAL at 23:13

## 2020-06-29 RX ADMIN — FAMOTIDINE 20 MILLIGRAM(S): 10 INJECTION INTRAVENOUS at 23:13

## 2020-06-29 RX ADMIN — Medication 400 MILLIGRAM(S): at 17:17

## 2020-06-29 RX ADMIN — QUETIAPINE FUMARATE 50 MILLIGRAM(S): 200 TABLET, FILM COATED ORAL at 17:16

## 2020-06-29 RX ADMIN — Medication 25 MILLIGRAM(S): at 11:25

## 2020-06-29 RX ADMIN — Medication 60 MILLIGRAM(S): at 23:13

## 2020-06-29 RX ADMIN — Medication 0.5 MILLIGRAM(S): at 23:13

## 2020-06-29 RX ADMIN — QUETIAPINE FUMARATE 200 MILLIGRAM(S): 200 TABLET, FILM COATED ORAL at 23:13

## 2020-06-29 RX ADMIN — GABAPENTIN 400 MILLIGRAM(S): 400 CAPSULE ORAL at 23:12

## 2020-06-29 RX ADMIN — POLYETHYLENE GLYCOL 3350 17 GRAM(S): 17 POWDER, FOR SOLUTION ORAL at 17:17

## 2020-06-29 RX ADMIN — ALBUTEROL 2 PUFF(S): 90 AEROSOL, METERED ORAL at 12:12

## 2020-06-29 RX ADMIN — BUDESONIDE AND FORMOTEROL FUMARATE DIHYDRATE 2 PUFF(S): 160; 4.5 AEROSOL RESPIRATORY (INHALATION) at 20:35

## 2020-06-29 RX ADMIN — Medication 1 MILLIGRAM(S): at 11:25

## 2020-06-29 RX ADMIN — ALBUTEROL 2 PUFF(S): 90 AEROSOL, METERED ORAL at 20:35

## 2020-06-29 RX ADMIN — FAMOTIDINE 20 MILLIGRAM(S): 10 INJECTION INTRAVENOUS at 11:25

## 2020-06-29 RX ADMIN — Medication 5 MILLIGRAM(S): at 23:13

## 2020-06-29 RX ADMIN — Medication 60 MILLIGRAM(S): at 17:16

## 2020-06-29 RX ADMIN — Medication 60 MILLIGRAM(S): at 02:14

## 2020-06-29 RX ADMIN — CLOPIDOGREL BISULFATE 75 MILLIGRAM(S): 75 TABLET, FILM COATED ORAL at 11:25

## 2020-06-29 RX ADMIN — Medication 100 MILLIGRAM(S): at 11:25

## 2020-06-29 RX ADMIN — TIOTROPIUM BROMIDE 1 CAPSULE(S): 18 CAPSULE ORAL; RESPIRATORY (INHALATION) at 08:00

## 2020-06-29 RX ADMIN — Medication 60 MILLIGRAM(S): at 06:40

## 2020-06-29 RX ADMIN — ALBUTEROL 2 PUFF(S): 90 AEROSOL, METERED ORAL at 08:00

## 2020-06-29 RX ADMIN — ALBUTEROL 2 PUFF(S): 90 AEROSOL, METERED ORAL at 16:08

## 2020-06-29 RX ADMIN — GABAPENTIN 400 MILLIGRAM(S): 400 CAPSULE ORAL at 06:40

## 2020-06-29 RX ADMIN — BUDESONIDE AND FORMOTEROL FUMARATE DIHYDRATE 2 PUFF(S): 160; 4.5 AEROSOL RESPIRATORY (INHALATION) at 08:00

## 2020-06-29 RX ADMIN — Medication 81 MILLIGRAM(S): at 11:25

## 2020-06-29 RX ADMIN — QUETIAPINE FUMARATE 100 MILLIGRAM(S): 200 TABLET, FILM COATED ORAL at 08:31

## 2020-06-29 NOTE — SWALLOW BEDSIDE ASSESSMENT ADULT - SWALLOW EVAL: RECOMMENDED FEEDING/EATING TECHNIQUES
crush medication (when feasible)/small sips/bites/maintain upright posture during/after eating for 30 mins

## 2020-06-29 NOTE — PROGRESS NOTE ADULT - SUBJECTIVE AND OBJECTIVE BOX
Subjective:    pat on trach with RA, minimal secerations, no new events, still with agitated & aggressive.    Home Medications:  acetaminophen 325 mg oral tablet: 2 tab(s) orally every 6 hours, As needed, Temp greater or equal to 38.5C (101.3F) (02 Jun 2020 12:53)  albuterol 90 mcg/inh inhalation aerosol: 2 puff(s) inhaled every 4 hours (02 Jun 2020 12:53)  ALPRAZolam 0.5 mg oral tablet: 1 tab(s) orally every 6 hours (02 Jun 2020 12:53)  aspirin 81 mg oral tablet, chewable: 1 tab(s) orally once a day (02 Jun 2020 12:53)  clopidogrel 75 mg oral tablet: 1 tab(s) orally once a day (02 Jun 2020 12:53)  dilTIAZem 90 mg oral tablet: 1 tab(s) orally every 6 hours (02 Jun 2020 12:53)  doxazosin 2 mg oral tablet: 1 tab(s) orally once a day (at bedtime) (02 Jun 2020 12:53)  gabapentin 400 mg oral capsule: 1 cap(s) orally 3 times a day (02 Jun 2020 12:53)  pantoprazole 40 mg oral granule, enteric coated: 40 milligram(s) orally once a day (19 Apr 2020 03:12)  QUEtiapine 100 mg oral tablet: 1 tab(s) orally once a day (at bedtime) (02 Jun 2020 12:53)  QUEtiapine 50 mg oral tablet: 1 tab(s) orally once a day (02 Jun 2020 12:53)  Spiriva 18 mcg inhalation capsule: 1 cap(s) inhaled once a day (19 Apr 2020 03:12)  thiamine 100 mg oral tablet: 1 tab(s) orally once a day (02 Jun 2020 12:53)    MEDICATIONS  (STANDING):  ALBUTerol    90 MICROgram(s) HFA Inhaler 2 Puff(s) Inhalation every 4 hours  aspirin  chewable 81 milliGRAM(s) Oral daily  budesonide 160 MICROgram(s)/formoterol 4.5 MICROgram(s) Inhaler 2 Puff(s) Inhalation two times a day  clonazePAM  Tablet 1 milliGRAM(s) Oral two times a day  clopidogrel Tablet 75 milliGRAM(s) Oral daily  diltiazem    Tablet 60 milliGRAM(s) Oral every 6 hours  doxazosin 2 milliGRAM(s) Oral at bedtime  enoxaparin Injectable 40 milliGRAM(s) SubCutaneous every 12 hours  famotidine    Tablet 20 milliGRAM(s) Oral two times a day  gabapentin   Solution 400 milliGRAM(s) Enteral Tube three times a day  melatonin 5 milliGRAM(s) Oral at bedtime  metoprolol tartrate 25 milliGRAM(s) Oral two times a day  polyethylene glycol 3350 17 Gram(s) Oral daily  QUEtiapine 100 milliGRAM(s) Oral <User Schedule>  QUEtiapine 50 milliGRAM(s) Oral <User Schedule>  QUEtiapine 200 milliGRAM(s) Oral at bedtime  senna 2 Tablet(s) Oral at bedtime  thiamine 100 milliGRAM(s) Oral daily  tiotropium 18 MICROgram(s) Capsule 1 Capsule(s) Inhalation daily    MEDICATIONS  (PRN):  acetaminophen   Tablet .. 650 milliGRAM(s) Oral every 6 hours PRN Temp greater or equal to 38.5C (101.3F)  ibuprofen  Tablet. 400 milliGRAM(s) Oral every 6 hours PRN Moderate Pain (4 - 6)      Allergies    Ceclor (Rash)  Duricef (Rash)    Intolerances        Vital Signs Last 24 Hrs  T(C): 36.1 (29 Jun 2020 05:00), Max: 36.6 (28 Jun 2020 18:50)  T(F): 96.9 (29 Jun 2020 05:00), Max: 97.8 (28 Jun 2020 18:50)  HR: 84 (29 Jun 2020 12:17) (79 - 104)  BP: 130/66 (29 Jun 2020 10:00) (93/61 - 130/66)  BP(mean): 83 (29 Jun 2020 10:00) (64 - 114)  RR: 24 (29 Jun 2020 10:00) (20 - 34)  SpO2: 98% (29 Jun 2020 12:17) (88% - 99%)      PHYSICAL EXAMINATION:    NECK:  Supple. No lymphadenopathy. Jugular venous pressure not elevated. Carotids equal.   HEART:   The cardiac impulse has a normal quality. Reg., Nl S1 and S2.  There are no murmurs, rubs or gallops noted  CHEST:  Chest crackles to auscultation. Normal respiratory effort.  ABDOMEN:  Soft and nontender.   EXTREMITIES:  There is no edema.       LABS:                        10.5   5.35  )-----------( 215      ( 29 Jun 2020 06:27 )             33.9     06-29    140  |  105  |  20  ----------------------------<  94  3.9   |  30  |  0.56    Ca    9.0      29 Jun 2020 06:27

## 2020-06-29 NOTE — SWALLOW BEDSIDE ASSESSMENT ADULT - ORAL PHASE
Bolus formation/transfer with pureed foods were achieved via mildly to moderately prolonged discontinuous lingual pumping actions that were felt to be grossly functional with the latter food consistency. Piecemeal deglutition was evident. Mild scattered tongue debris noted with puree food.
Bolus formation/transfer with pureed foods and honey thick liquids were achieved via mildly to moderately prolonged discontinuous lingual pumping actions that were felt to be grossly functional with the latter food consistencies. Piecemeal deglutition was evident. Mild scattered tongue debris noted with puree food.
Bolus formation/transfer with mechanical soft solids were excessively prolonged and discontinuous. Bolus formation/transfer with pureed foods/liquids were achieved via mildly to moderately prolonged discontinuous lingual pumping actions that were felt to be grossly functional with some of the latter food consistencies. Piecemeal deglutition was evident. Scattered tongue debris noted with mechanical soft solids.

## 2020-06-29 NOTE — SWALLOW BEDSIDE ASSESSMENT ADULT - SWALLOW EVAL: STRUCTURAL ABNORMALITIES
A tracheostomy is in place(cuffless, fenestrated).
A tracheostomy is in place(cuffless, fenestrated).
A well healed scar was evident at tracheal decannulation site.

## 2020-06-29 NOTE — SWALLOW BEDSIDE ASSESSMENT ADULT - SWALLOW EVAL: RECOMMENDED DIET
SUGGEST INITIATING A DYSPHAGIA 1 DIET WITH HONEY THICK LIQUIDS AS TOLERATED. THIS IS THE LEAST RESTRICTIVE TOLERABLE DIET CONSISTENCIES FROM AN OROPHARYNGEAL SWALLOWING STANCE WHEN IN AN ALERT STATE. PT MUST BE ALERT WHEN ORALLY FEEDING.

## 2020-06-29 NOTE — SWALLOW BEDSIDE ASSESSMENT ADULT - SWALLOW EVAL: DIAGNOSIS
1) Pt exhibits periodically reduced orientation to feeding atop Oropharyngeal Dysphagia which now subjectively appeared to be a grossly functional condition with a restricted inventory of modified food consistencies. No behavioral aspiration signs exhibited with puree/honey thick liquid & no blue dye noted in tracheal secretions on exam. 2) Pt is awake but anxious, impulsive and internally distractible. Unable to direct to communication tasks but he did occasionally follow 1 step commands and sometimes responsively verbalized when asked a personally relevant question. At these times, his utterances were produced without a yan primary speech-language pathology when attempting to talk. However, his voice was raspy, breathy, low in volume and wet c/w Dysphonia in setting of prolonged transient intubation/tracheostomy placement. Further, his verbalizations were variably contextually inappropriate c/w encephalopathy related to acute illness(i.e MSSA bacteremia, PNA, ETOH withdrawal, etc). 1) Pt exhibits periodically reduced orientation to feeding atop Oropharyngeal Dysphagia which now subjectively appeared to be a grossly functional condition with a restricted inventory of modified food consistencies. No behavioral aspiration signs exhibited with puree/honey thick liquid & no blue dye noted in tracheal secretions on exam. 2) Pt is awake but anxious, impulsive & internally distractible. Unable to direct to communication tasks but he did occasionally follow 1 step commands & sometimes responsively verbalized when asked a personally relevant question. At these times, his utterances were produced without a yan primary speech-language pathology when attempting to talk. However, his voice was raspy, breathy, & low in volume c/w Dysphonia in setting of prolonged transient intubation/tracheostomy placement. Further, his verbalizations were variably contextually inappropriate c/w encephalopathy related to acute illness(i.e MSSA bacteremia, PNA, ETOH withdrawal, etc).

## 2020-06-29 NOTE — SWALLOW BEDSIDE ASSESSMENT ADULT - ASR SWALLOW LABIAL MOBILITY
Unable to assess due to pt's variably altered mentation with reduced active participation. Though it is noted that no overt lip focality was evident during reflexive oral movements.
Unable to assess due to pt's variably altered mentation with reduced active participation. Though it is noted that no overt lip focality was evident during reflexive oral movements.
Unable to assess due to pt's altered alertness/mentation with reduced active participation.

## 2020-06-29 NOTE — SWALLOW BEDSIDE ASSESSMENT ADULT - ASR SWALLOW RECOMMEND DIAG
DEFER MBS AS PT APPEARED CLINICALLY TOLERANT OF SUGGESTED PO TEXTURES FROM AN OROPHARYNGEAL SWALLOWING STANCE, DYSPHAGIA WITH A PROPENSITY TO FLUCTUATE IN SEVERITY GIVEN PROFILE AND CONSIDERING HIS VARIABLY ALTERED MENTATION/COMPLIANCE WITH REDUCED STIMULABILITY FOR USE OF COMPENSATORY SWALLOWING MANEUVERS. DEFER MBS AS PT APPEARED CLINICALLY TOLERANT OF SUGGESTED PO TEXTURES FROM AN OROPHARYNGEAL SWALLOWING STANCE, DYSPHAGIA WITH A PROPENSITY TO FLUCTUATE IN SEVERITY GIVEN PROFILE AND CONSIDERING HIS VARIABLY ALTERED MENTATION/COMPLIANCE WITH REDUCED STIMULABILITY FOR EFECTIVE USE OF COMPENSATORY SWALLOWING MANEUVERS.

## 2020-06-29 NOTE — SWALLOW BEDSIDE ASSESSMENT ADULT - SLP GENERAL OBSERVATIONS
On encounter, a cuffless fenestrated tracheostomy was in place. He was congested but congestion was less prominent compared to time of last exam.  Pt is awake but fatigued, variably anxious, impulsive and internally distractible at times. Unable to direct to communication tasks but he did occasionally follow 1 step commands and sometimes responsively verbalized when asked a personally relevant question. At these times, his utterances were produced without a yan primary speech-language pathology when attempting to talk. However, his voice was raspy, breathy, low in volume and wet c/w Dysphonia in setting of prolonged transient intubation/tracheostomy placement. Further, his verbalizations were variably contextually inappropriate c/w encephalopathy related to acute illness(i.e MSSA bacteremia, PNA, ETOH withdrawal, etc). Mentation seems somewhat improved compared to time of last exam nonetheless. On encounter, a cuffless fenestrated tracheostomy was in place. He was congested but congestion was less prominent compared to time of last exam.  Pt is awake but fatigued, variably anxious, impulsive and internally distractible at times. Unable to direct to communication tasks but he did occasionally follow 1 step commands and sometimes responsively verbalized when asked a personally relevant question. At these times, his utterances were produced without a yan primary speech-language pathology when attempting to talk. However, his voice was raspy, breathy, and low in volume c/w Dysphonia in setting of prolonged transient intubation/tracheostomy placement. Further, his verbalizations were variably contextually inappropriate c/w encephalopathy related to acute illness(i.e MSSA bacteremia, PNA, ETOH withdrawal, rapid A-Fib, hemorrhage in right rectus abdominal region, etc). Mentation seems somewhat improved compared to time of last exam nonetheless, though his mental status does seem to wax/wane.

## 2020-06-29 NOTE — SWALLOW BEDSIDE ASSESSMENT ADULT - ASR SWALLOW LINGUAL MOBILITY
Unable to assess due to pt's variably altered mentation with reduced active participation. Though it is noted that no overt tongue focality was evident during reflexive oral movements.

## 2020-06-29 NOTE — SWALLOW BEDSIDE ASSESSMENT ADULT - ADDITIONAL RECOMMENDATIONS
1)  NUTRITION FOLLOW UP. PT WITH HTN, CHF, GERD AND DYSPHAGIA. HE CAN NOW TOLERATE SOME ORAL FEEDS OF MODIFIED CONSISTENCY. AS PO INTAKE INCREASES, HIS PEG FEEDS CAN BE TAPERED WITH THE ULTIMATE GOAL TO WEAN FROM PEG AS TECHNICALLY FEASIBLE.       2) PT IS NOT A CANDIDATE FOR A SPEAKING VALVE AS HE, COUGHS AND BECOMES INCREASINGLY CONGESTED UPON ATTEMPTS TO MANUALLY OCCLUDE TRACHEOSTOMY FOR PROLONGED PERIODS OF TIME.    3) CANDIDACY FOR TRACHEOSTOMY CAPPING IS AT DISCRETION OF INTENSIVIST.     4) NURSING ADVISED TO CEASE PO AND ADVISE ME ASAP IF ANY BLUE DYE IS NOTED IN TRACHEAL SECRETIONS OVER NEXT 24 HOURS. MY CELL  [phone].

## 2020-06-29 NOTE — SWALLOW BEDSIDE ASSESSMENT ADULT - NS SPL SWALLOW CLINIC TRIAL FT
Solids and liquids thinner than honey consistency were not offered given dysphagic profile. Odynophagia was denied. Effects of acute medical deconditioning are contributory to his feeding compromise/Dysphagia(i.e PNA with MSSA bacteremia, rapid A-Fib, COPD exacerbation with respiratory failure warranting transient mechanical ventilation/tracheostomy placement with laryngeal drag effect, ETOH withdrawal). Note that oropharyngeal swallow status is somewhat improved compared to time of prior exam nonetheless. Solids and liquids thinner than honey consistency were not offered given dysphagic profile. Odynophagia was denied. Effects of acute medical deconditioning are contributory to his feeding compromise/Dysphagia(i.e PNA with MSSA bacteremia, rapid A-Fib, COPD exacerbation with respiratory failure warranting transient mechanical ventilation/tracheostomy placement with laryngeal drag effect, ETOH withdrawal, rapid A-Fib, bleed in abdominal right rectus region). Note that oropharyngeal swallow status is somewhat improved compared to time of prior exam nonetheless.

## 2020-06-29 NOTE — PROGRESS NOTE ADULT - ASSESSMENT
A/P: 65 male with acute respiratory failure from altered awareness and sedation  COPD  AFIB s/p watchman  CHF  HTN  ETOH abuse    Plan:   PULM: S/P Trach, with a cuffless #6 fenestrated    Cardio: Continue ASA and Plavix.  Continue Cardizem and Lopressor for AFIB     Renal: Cr. Stable    GI: Feeds, PPI, call back speech and swallow    PSY: Thiamine for chronic ETOH abuse and thiamine deficiency folate, Seroquel and Klonopin for agitation      Lovenox DVT Prophylaxis    PT    needs placement

## 2020-06-29 NOTE — PROGRESS NOTE ADULT - SUBJECTIVE AND OBJECTIVE BOX
HPI: Patient seen at a  this morning for AMS and hypoxic  Patient had been admitted with a COPD exacerbation.  It was believed that he went into ETOH withdrawal and had a total of 6MG Ativan over night.  He required intubation 4/23.    4/24: he remains intubated.  Will try to wean this afternoon.      4/25: He weaned this morning and failed with a decreased Tv.  But close to extubation.    4/26: Patient weaned well this morning and was extubated.      5/2: Chart reviewed, Patient was reintubated, failed weaning this morning, CXR with improving infiltrates  5/3: Patient remains on the vent failed weaning x3 today with increased RR and low TV, Off Levophed now  5/4: Failed weaning yesterday, had to change his ET tube yesterday because he chewed through it,     5/5: Pt on Precedex and Diprivan for sedation, Patient failed weaning again today  5/6: Patient failed weaning today with a low TV and High RR  5/7: He again failed weaning, NGT replaced, Fi O2 back to 40%, WBC normal,     5/8: For weaning today, he has failed weaning daily  5/9: Weaning this morning off levo  5/10: He Failed weaning this morning    5/14: Patient failed weaning again this morning.  For Trach and PEG in 5/15    5/17: S/P Trach and PEG.  Weaned for several hours yesterday  5/18: Weaned for under an hour yesterday but desated.  Temps to 104 x 2 over night.    5/19: No Weaning yesterday because of the ongoing temps.  Now with MRSA in the Blood.    5/20: Temps down, now off precedex  5/21: Failed weaning yesterday, BC remain positive      6/29: Chart reviewed.  Agitation noted over night.  Patient now on TC.  He is awake this morning.  No temps WBC normal     PAST MEDICAL HX  Alcohol abuse    Alcohol withdrawal    Anemia    AFIB atrial fibrillation    CHF (congestive heart failure)    Chronic atrial fibrillation    Chronic CHF    Chronic obstructive pulmonary disease (COPD)    Cirrhosis    Collapsed lung    COPD without exacerbation    Emphysema of lung    Falls    GERD (gastroesophageal reflux disease)    HTN hypertension    Meningitis    Poor historian    Presence of Watchman left atrial appendage closure device.    PAST SURGICAL HX  Presence of Watchman left atrial appendage closure device    S/P BKA (below knee amputation) unilateral, left    Unilateral amputation of lower extremity below knee.      FAMILY HX  Family history unknown: Adopted      SOCIAL HX  lives alone  former smoker  alcohol use last 2-3 days ago (19 Apr 2020 03:19)       PAST MEDICAL & SURGICAL HISTORY:  Chronic CHF  COPD without exacerbation  Atrial fibrillation  Presence of Watchman left atrial appendage closure device  Hypertension  GERD (gastroesophageal reflux disease)  Chronic obstructive pulmonary disease (COPD)  Anemia  Falls  Meningitis  Collapsed lung  Alcohol withdrawal  Emphysema of lung  Cirrhosis  CHF (congestive heart failure)  Poor historian  Alcohol abuse  Chronic atrial fibrillation  Unilateral amputation of lower extremity below knee  Presence of Watchman left atrial appendage closure device  S/P BKA (below knee amputation) unilateral, left      FAMILY HISTORY:  No pertinent family history in first degree relatives  Family history unknown: Adopted      Social Hx:    Allergies    Ceclor (Rash)  Duricef (Rash)    Intolerances            ICU Vital Signs Last 24 Hrs  T(C): 36.1 (29 Jun 2020 05:00), Max: 36.6 (28 Jun 2020 18:50)  T(F): 96.9 (29 Jun 2020 05:00), Max: 97.8 (28 Jun 2020 18:50)  HR: 93 (29 Jun 2020 09:00) (79 - 104)  BP: 117/54 (29 Jun 2020 09:00) (93/61 - 125/111)  BP(mean): 69 (29 Jun 2020 09:00) (63 - 114)  ABP: --  ABP(mean): --  RR: 29 (29 Jun 2020 09:00) (20 - 34)  SpO2: 97% (29 Jun 2020 09:00) (88% - 99%)          I&O's Summary    28 Jun 2020 07:01  -  29 Jun 2020 07:00  --------------------------------------------------------  IN: 1400 mL / OUT: 0 mL / NET: 1400 mL                              10.5   5.35  )-----------( 215      ( 29 Jun 2020 06:27 )             33.9       06-29    140  |  105  |  20  ----------------------------<  94  3.9   |  30  |  0.56    Ca    9.0      29 Jun 2020 06:27                      MEDICATIONS  (STANDING):  ALBUTerol    90 MICROgram(s) HFA Inhaler 2 Puff(s) Inhalation every 4 hours  aspirin  chewable 81 milliGRAM(s) Oral daily  budesonide 160 MICROgram(s)/formoterol 4.5 MICROgram(s) Inhaler 2 Puff(s) Inhalation two times a day  clonazePAM  Tablet 1 milliGRAM(s) Oral two times a day  clopidogrel Tablet 75 milliGRAM(s) Oral daily  diltiazem    Tablet 60 milliGRAM(s) Oral every 6 hours  doxazosin 2 milliGRAM(s) Oral at bedtime  enoxaparin Injectable 40 milliGRAM(s) SubCutaneous every 12 hours  famotidine    Tablet 20 milliGRAM(s) Oral two times a day  gabapentin   Solution 400 milliGRAM(s) Enteral Tube three times a day  melatonin 5 milliGRAM(s) Oral at bedtime  metoprolol tartrate 25 milliGRAM(s) Oral two times a day  polyethylene glycol 3350 17 Gram(s) Oral daily  QUEtiapine 100 milliGRAM(s) Oral <User Schedule>  QUEtiapine 50 milliGRAM(s) Oral <User Schedule>  QUEtiapine 200 milliGRAM(s) Oral at bedtime  senna 2 Tablet(s) Oral at bedtime  thiamine 100 milliGRAM(s) Oral daily  tiotropium 18 MICROgram(s) Capsule 1 Capsule(s) Inhalation daily    MEDICATIONS  (PRN):  acetaminophen   Tablet .. 650 milliGRAM(s) Oral every 6 hours PRN Temp greater or equal to 38.5C (101.3F)  ibuprofen  Tablet. 400 milliGRAM(s) Oral every 6 hours PRN Moderate Pain (4 - 6)      DVT Prophylaxis: Lovenox    Advanced Directives:  Discussed with:    Visit Information:    ** Time is exclusive of billed procedures and/or teaching and/or routine family updates.

## 2020-06-29 NOTE — SWALLOW BEDSIDE ASSESSMENT ADULT - SWALLOW EVAL: SECRETION MANAGEMENT
Limited probes revealed that his non nutritive cough was moist and produced with moderately reduced strength, even when tracheostomy hub was occluded.
Limited probes revealed that his non nutritive cough was moist and produced with moderately reduced strength.
No drooling was noted. Moreover, limited probes revealed that his non nutritive cough was moist and produced with mildly to moderately reduced strength.

## 2020-06-29 NOTE — SWALLOW BEDSIDE ASSESSMENT ADULT - ORAL PREPARATORY PHASE
Pt's orientation to feeding was variably reduced. Pt was distractible and impulsive when accepting PO. Labial grading on utensils was grossly functional.
Pt's orientation to feeding was variably reduced. Pt was distractible and impulsive when accepting PO. Labial grading on utensils was grossly functional.
Pt's alertness for/orientation to feeding were variably reduced. Pt often moaned while PO was being presented. Oral aperture reduced when accepting food. Labial grading on utensils was grossly functional.

## 2020-06-29 NOTE — SWALLOW BEDSIDE ASSESSMENT ADULT - COMMENTS
The patient's selected hospital course is notable for encephalopathy with generalized slowing on EEG, ETOH withdrawal, agitation warranting Xanax/Seroquel, PNA with MSSA bacteremia, COPD exacerbation with respiratory failure s/p need for prolonged transient mechanical ventilation/tracheostomy placement, rapid A-Fib, finding of a kidney stone on right, finding of splenomegaly on imaging, hemorrhage in right rectus abdominal region, and Dysphagia status post PEG placement. Note that pt was initially followed by this service earlier in this hospitalization and was diagnosed with altered mentation/Dysphagia prior to tracheostomy placement/PEG placement. The pt was subsequently D/C from program due to the extent of his medical compromise at a time when he was vent dependent. The pt was also re-evaluated by this service on 6/23/20 but lacked therapy candidacy at that time. A 3rd reconsult was requested today as medical status has improved compared to time of last exam. He has an underlying history of A-Fib s/p Watchman Device, CHF, COPD, emphysema, HTN, ETOH abuse, cirrhosis, Dysphagia, GERD, past meningitis, previous collapsed lung, and prior need for tracheostomy/PEG placements when acutely deconditioned(both of which were removed prior to current hospitalization but he again needed a PEG/tracheostomy placed during current hospitalization).  Additionally, pt is s/p a left BKA.

## 2020-06-29 NOTE — PROGRESS NOTE ADULT - ASSESSMENT
PROBLEMS;    Febrile illness- GNR in sputum-pseudomonas/proteus-off abx  s/p staph bactermia/sputum pseudomonas/staph  Ac on chronic hypercapnic & hypoxamic respiratory failure-s/p trach & PEG  sputum-Few Pseudomonas aeruginosa (Carbapenem Resistant)/Moderate Methicillin resistant Staphylococcus aureus  afib with RVR  [facility]  AC flare of COPD/chronic vs acute on chronic bronchitis  Mild interstitial lung disease-NSIP h/o smoking  COVID negativex2  Pulmonary hypertension  Nonobstructing stone in the left ureterovesicular junction/ nonobstructing stone in the lower pole right kidney.  Subacute hemorrhage in the right rectus abdominis along the anterior sheath, measures 1.6 cm in thickness and extends from the umbilicus to the inferior myotendinous junction  Cirrhosis  Mild splenomegaly-portal venous hypertension.  Chronic afib w Watchman device  CHF  BPH  GERD  ETOH abuse  seizures disorder    PLAN;    pulmonary unchanged  Trach on RA  trach suction  Tube feeding as tolerated  supportive care  covid repeat testing-neg  Eliquis/asa 81  d/w staff

## 2020-06-29 NOTE — PROGRESS NOTE BEHAVIORAL HEALTH - OTHER
deferred did not ambulate - in chair; Patient has trach Patient has trach simple impoverished unable to assess

## 2020-06-29 NOTE — SWALLOW BEDSIDE ASSESSMENT ADULT - PHARYNGEAL PHASE
Swallow trigger was timely to mildly latent. Laryngeal lift on palpation during swallowing trials was moderately reduced but felt to be grossly functional with the above modified food consistencies.  No behavioral aspiration signs exhibited with pureed foods and honey thick liquids. No fruit dye noted in tracheostomy secretions post prandially. O2 sats unchanged pre/post swallow testing.

## 2020-06-29 NOTE — PROGRESS NOTE ADULT - SUBJECTIVE AND OBJECTIVE BOX
Subjective:  No acute events overnight.      Vital Signs:  Vital Signs Last 24 Hrs  T(C): 36.1 (06-29-20 @ 05:00), Max: 36.6 (06-28-20 @ 18:50)  T(F): 96.9 (06-29-20 @ 05:00), Max: 97.8 (06-28-20 @ 18:50)  HR: 93 (06-29-20 @ 09:00) (79 - 104)  BP: 117/54 (06-29-20 @ 09:00) (93/61 - 125/111)  RR: 29 (06-29-20 @ 09:00) (20 - 34)  SpO2: 97% on room air    Telemetry/Alarms:  atrial fibrillation    Relevant labs, radiology and Medications reviewed                        10.5   5.35  )-----------( 215      ( 29 Jun 2020 06:27 )             33.9     06-29    140  |  105  |  20  ----------------------------<  94  3.9   |  30  |  0.56    Ca    9.0      29 Jun 2020 06:27        MEDICATIONS  (STANDING):  ALBUTerol    90 MICROgram(s) HFA Inhaler 2 Puff(s) Inhalation every 4 hours  aspirin  chewable 81 milliGRAM(s) Oral daily  budesonide 160 MICROgram(s)/formoterol 4.5 MICROgram(s) Inhaler 2 Puff(s) Inhalation two times a day  clonazePAM  Tablet 1 milliGRAM(s) Oral two times a day  clopidogrel Tablet 75 milliGRAM(s) Oral daily  diltiazem    Tablet 60 milliGRAM(s) Oral every 6 hours  doxazosin 2 milliGRAM(s) Oral at bedtime  enoxaparin Injectable 40 milliGRAM(s) SubCutaneous every 12 hours  famotidine    Tablet 20 milliGRAM(s) Oral two times a day  gabapentin   Solution 400 milliGRAM(s) Enteral Tube three times a day  melatonin 5 milliGRAM(s) Oral at bedtime  metoprolol tartrate 25 milliGRAM(s) Oral two times a day  polyethylene glycol 3350 17 Gram(s) Oral daily  QUEtiapine 100 milliGRAM(s) Oral <User Schedule>  QUEtiapine 50 milliGRAM(s) Oral <User Schedule>  QUEtiapine 200 milliGRAM(s) Oral at bedtime  senna 2 Tablet(s) Oral at bedtime  thiamine 100 milliGRAM(s) Oral daily  tiotropium 18 MICROgram(s) Capsule 1 Capsule(s) Inhalation daily    MEDICATIONS  (PRN):  acetaminophen   Tablet .. 650 milliGRAM(s) Oral every 6 hours PRN Temp greater or equal to 38.5C (101.3F)  ibuprofen  Tablet. 400 milliGRAM(s) Oral every 6 hours PRN Moderate Pain (4 - 6)      Physical exam  Gen: NAD, breathing comfortably  Neuro: AO - agitated  Card: S1 S2  Pulm: Equal bilaterally  Abd: Soft PEG in place  Ext: no edema    I&O's Summary    28 Jun 2020 07:01  -  29 Jun 2020 07:00  --------------------------------------------------------  IN: 1400 mL / OUT: 0 mL / NET: 1400 mL        Assessment  65y Male  w/ PAST MEDICAL & SURGICAL HISTORY:  Chronic CHF  COPD without exacerbation  Atrial fibrillation  Presence of Watchman left atrial appendage closure device  Hypertension  GERD (gastroesophageal reflux disease)  Chronic obstructive pulmonary disease (COPD)  Anemia  Falls  Meningitis  Collapsed lung  Alcohol withdrawal  Emphysema of lung  Cirrhosis  CHF (congestive heart failure)  Poor historian  Alcohol abuse  Chronic atrial fibrillation  Unilateral amputation of lower extremity below knee  Presence of Watchman left atrial appendage closure device  S/P BKA (below knee amputation) unilateral, left    Patient is a 65y old  Male who presents with a chief complaint of acute hypoxemic, hypercapneic resp failure  COPD exacerbation (29 Jun 2020 09:39)      1.  Chronic respiratory failure  2.  COPD exacerbation  3.  Pneumonia  4.  Leukocytosis and fevers - now resolved    Continue capping trach as tolerated.  Continue diet.  Would not decannulate this hospital admission.  May follow up as outpatient with Dr. Morris for trach removal.  Discharge planning.    Discussed with Cardiothoracic Team at AM rounds.

## 2020-06-29 NOTE — SWALLOW BEDSIDE ASSESSMENT ADULT - SWALLOW EVAL: CRITERIA FOR SKILLED INTERVENTION MET
WILL NOW FOLLOW AS PT'S MENTATION/SWALLOW STATUS HAS IMPROVED TO THE POINT WHERE HE IS NOW A THERAPY CANDIDATE. WILL NOW FOLLOW AS PT'S MENTATION/SWALLOW STATUS HAVE IMPROVED TO THE POINT WHERE HE IS NOW A THERAPY CANDIDATE.

## 2020-06-30 LAB
ANION GAP SERPL CALC-SCNC: 5 MMOL/L — SIGNIFICANT CHANGE UP (ref 5–17)
BUN SERPL-MCNC: 16 MG/DL — SIGNIFICANT CHANGE UP (ref 7–23)
CALCIUM SERPL-MCNC: 9 MG/DL — SIGNIFICANT CHANGE UP (ref 8.5–10.1)
CHLORIDE SERPL-SCNC: 106 MMOL/L — SIGNIFICANT CHANGE UP (ref 96–108)
CO2 SERPL-SCNC: 30 MMOL/L — SIGNIFICANT CHANGE UP (ref 22–31)
CREAT SERPL-MCNC: 0.58 MG/DL — SIGNIFICANT CHANGE UP (ref 0.5–1.3)
GLUCOSE SERPL-MCNC: 86 MG/DL — SIGNIFICANT CHANGE UP (ref 70–99)
HCT VFR BLD CALC: 31.7 % — LOW (ref 39–50)
HGB BLD-MCNC: 10.2 G/DL — LOW (ref 13–17)
MAGNESIUM SERPL-MCNC: 2 MG/DL — SIGNIFICANT CHANGE UP (ref 1.6–2.6)
MCHC RBC-ENTMCNC: 31.2 PG — SIGNIFICANT CHANGE UP (ref 27–34)
MCHC RBC-ENTMCNC: 32.2 GM/DL — SIGNIFICANT CHANGE UP (ref 32–36)
MCV RBC AUTO: 96.9 FL — SIGNIFICANT CHANGE UP (ref 80–100)
PHOSPHATE SERPL-MCNC: 4.6 MG/DL — HIGH (ref 2.5–4.5)
PLATELET # BLD AUTO: 203 K/UL — SIGNIFICANT CHANGE UP (ref 150–400)
POTASSIUM SERPL-MCNC: 4 MMOL/L — SIGNIFICANT CHANGE UP (ref 3.5–5.3)
POTASSIUM SERPL-SCNC: 4 MMOL/L — SIGNIFICANT CHANGE UP (ref 3.5–5.3)
RBC # BLD: 3.27 M/UL — LOW (ref 4.2–5.8)
RBC # FLD: 15 % — HIGH (ref 10.3–14.5)
SODIUM SERPL-SCNC: 141 MMOL/L — SIGNIFICANT CHANGE UP (ref 135–145)
WBC # BLD: 6.57 K/UL — SIGNIFICANT CHANGE UP (ref 3.8–10.5)
WBC # FLD AUTO: 6.57 K/UL — SIGNIFICANT CHANGE UP (ref 3.8–10.5)

## 2020-06-30 PROCEDURE — 99232 SBSQ HOSP IP/OBS MODERATE 35: CPT

## 2020-06-30 RX ADMIN — ALBUTEROL 2 PUFF(S): 90 AEROSOL, METERED ORAL at 05:23

## 2020-06-30 RX ADMIN — SENNA PLUS 2 TABLET(S): 8.6 TABLET ORAL at 23:12

## 2020-06-30 RX ADMIN — Medication 2 MILLIGRAM(S): at 23:12

## 2020-06-30 RX ADMIN — GABAPENTIN 400 MILLIGRAM(S): 400 CAPSULE ORAL at 23:11

## 2020-06-30 RX ADMIN — GABAPENTIN 400 MILLIGRAM(S): 400 CAPSULE ORAL at 14:39

## 2020-06-30 RX ADMIN — Medication 100 MILLIGRAM(S): at 11:00

## 2020-06-30 RX ADMIN — ALBUTEROL 2 PUFF(S): 90 AEROSOL, METERED ORAL at 09:18

## 2020-06-30 RX ADMIN — Medication 60 MILLIGRAM(S): at 17:27

## 2020-06-30 RX ADMIN — CLOPIDOGREL BISULFATE 75 MILLIGRAM(S): 75 TABLET, FILM COATED ORAL at 11:00

## 2020-06-30 RX ADMIN — BUDESONIDE AND FORMOTEROL FUMARATE DIHYDRATE 2 PUFF(S): 160; 4.5 AEROSOL RESPIRATORY (INHALATION) at 20:31

## 2020-06-30 RX ADMIN — ALBUTEROL 2 PUFF(S): 90 AEROSOL, METERED ORAL at 16:22

## 2020-06-30 RX ADMIN — FAMOTIDINE 20 MILLIGRAM(S): 10 INJECTION INTRAVENOUS at 11:00

## 2020-06-30 RX ADMIN — POLYETHYLENE GLYCOL 3350 17 GRAM(S): 17 POWDER, FOR SOLUTION ORAL at 11:00

## 2020-06-30 RX ADMIN — Medication 81 MILLIGRAM(S): at 11:00

## 2020-06-30 RX ADMIN — Medication 25 MILLIGRAM(S): at 23:12

## 2020-06-30 RX ADMIN — Medication 5 MILLIGRAM(S): at 23:12

## 2020-06-30 RX ADMIN — BUDESONIDE AND FORMOTEROL FUMARATE DIHYDRATE 2 PUFF(S): 160; 4.5 AEROSOL RESPIRATORY (INHALATION) at 09:19

## 2020-06-30 RX ADMIN — Medication 0.5 MILLIGRAM(S): at 23:12

## 2020-06-30 RX ADMIN — Medication 0.5 MILLIGRAM(S): at 10:59

## 2020-06-30 RX ADMIN — QUETIAPINE FUMARATE 50 MILLIGRAM(S): 200 TABLET, FILM COATED ORAL at 15:51

## 2020-06-30 RX ADMIN — ENOXAPARIN SODIUM 40 MILLIGRAM(S): 100 INJECTION SUBCUTANEOUS at 11:00

## 2020-06-30 RX ADMIN — Medication 60 MILLIGRAM(S): at 05:42

## 2020-06-30 RX ADMIN — Medication 60 MILLIGRAM(S): at 12:11

## 2020-06-30 RX ADMIN — ALBUTEROL 2 PUFF(S): 90 AEROSOL, METERED ORAL at 01:18

## 2020-06-30 RX ADMIN — FAMOTIDINE 20 MILLIGRAM(S): 10 INJECTION INTRAVENOUS at 23:12

## 2020-06-30 RX ADMIN — QUETIAPINE FUMARATE 50 MILLIGRAM(S): 200 TABLET, FILM COATED ORAL at 08:41

## 2020-06-30 RX ADMIN — ALBUTEROL 2 PUFF(S): 90 AEROSOL, METERED ORAL at 20:31

## 2020-06-30 RX ADMIN — QUETIAPINE FUMARATE 200 MILLIGRAM(S): 200 TABLET, FILM COATED ORAL at 23:12

## 2020-06-30 RX ADMIN — Medication 60 MILLIGRAM(S): at 23:12

## 2020-06-30 RX ADMIN — GABAPENTIN 400 MILLIGRAM(S): 400 CAPSULE ORAL at 05:42

## 2020-06-30 RX ADMIN — ALBUTEROL 2 PUFF(S): 90 AEROSOL, METERED ORAL at 12:20

## 2020-06-30 RX ADMIN — TIOTROPIUM BROMIDE 1 CAPSULE(S): 18 CAPSULE ORAL; RESPIRATORY (INHALATION) at 09:16

## 2020-06-30 RX ADMIN — ALBUTEROL 2 PUFF(S): 90 AEROSOL, METERED ORAL at 16:24

## 2020-06-30 RX ADMIN — ENOXAPARIN SODIUM 40 MILLIGRAM(S): 100 INJECTION SUBCUTANEOUS at 23:12

## 2020-06-30 RX ADMIN — Medication 25 MILLIGRAM(S): at 11:00

## 2020-06-30 NOTE — PROGRESS NOTE ADULT - SUBJECTIVE AND OBJECTIVE BOX
Subjective:  No acute events overnight.    Vital Signs:  Vital Signs Last 24 Hrs  T(C): 36.9 (06-30-20 @ 06:00), Max: 36.9 (06-29-20 @ 21:00)  T(F): 98.4 (06-30-20 @ 06:00), Max: 98.4 (06-29-20 @ 21:00)  HR: 73 (06-30-20 @ 07:00) (73 - 94)  BP: 103/50 (06-30-20 @ 07:00) (95/53 - 153/87)  RR: 33 (06-30-20 @ 07:00) (20 - 35)  SpO2: 89% (06-30-20 @ 07:00) (89% - 100%) on room air     Telemetry/Alarms:  atrial fibrillation    Relevant labs, radiology and Medications reviewed                        10.2   6.57  )-----------( 203      ( 30 Jun 2020 07:25 )             31.7     06-30    141  |  106  |  16  ----------------------------<  86  4.0   |  30  |  0.58    Ca    9.0      30 Jun 2020 07:25  Phos  4.6     06-30  Mg     2.0     06-30        MEDICATIONS  (STANDING):  ALBUTerol    90 MICROgram(s) HFA Inhaler 2 Puff(s) Inhalation every 4 hours  aspirin  chewable 81 milliGRAM(s) Oral daily  budesonide 160 MICROgram(s)/formoterol 4.5 MICROgram(s) Inhaler 2 Puff(s) Inhalation two times a day  clonazePAM  Tablet 0.5 milliGRAM(s) Oral two times a day  clopidogrel Tablet 75 milliGRAM(s) Oral daily  diltiazem    Tablet 60 milliGRAM(s) Oral every 6 hours  doxazosin 2 milliGRAM(s) Oral at bedtime  enoxaparin Injectable 40 milliGRAM(s) SubCutaneous every 12 hours  famotidine    Tablet 20 milliGRAM(s) Oral two times a day  gabapentin   Solution 400 milliGRAM(s) Enteral Tube three times a day  melatonin 5 milliGRAM(s) Oral at bedtime  metoprolol tartrate 25 milliGRAM(s) Oral two times a day  polyethylene glycol 3350 17 Gram(s) Oral daily  QUEtiapine 50 milliGRAM(s) Oral <User Schedule>  QUEtiapine 50 milliGRAM(s) Oral <User Schedule>  QUEtiapine 200 milliGRAM(s) Oral at bedtime  senna 2 Tablet(s) Oral at bedtime  thiamine 100 milliGRAM(s) Oral daily  tiotropium 18 MICROgram(s) Capsule 1 Capsule(s) Inhalation daily    MEDICATIONS  (PRN):  acetaminophen   Tablet .. 650 milliGRAM(s) Oral every 6 hours PRN Temp greater or equal to 38.5C (101.3F)  ibuprofen  Tablet. 400 milliGRAM(s) Oral every 6 hours PRN Moderate Pain (4 - 6)      Physical exam  Gen: NAD, resting comfortably  Neuro: AO, less agitated  Card: S1 S2  Pulm: equal bilaterally  Abd: Soft PEG in place  Ext: no edema    I&O's Summary    29 Jun 2020 07:01  -  30 Jun 2020 07:00  --------------------------------------------------------  IN: 600 mL / OUT: 250 mL / NET: 350 mL        Assessment  65y Male  w/ PAST MEDICAL & SURGICAL HISTORY:  Chronic CHF  COPD without exacerbation  Atrial fibrillation  Presence of Watchman left atrial appendage closure device  Hypertension  GERD (gastroesophageal reflux disease)  Chronic obstructive pulmonary disease (COPD)  Anemia  Falls  Meningitis  Collapsed lung  Alcohol withdrawal  Emphysema of lung  Cirrhosis  CHF (congestive heart failure)  Poor historian  Alcohol abuse  Chronic atrial fibrillation  Unilateral amputation of lower extremity below knee  Presence of Watchman left atrial appendage closure device  S/P BKA (below knee amputation) unilateral, left      Patient is a 65y old  Male who presents with a chief complaint of acute hypoxemic, hypercapneic resp failure  COPD exacerbation (29 Jun 2020 12:31)    1.  Chronic respiratory failure  2.  COPD exacerbation  3.  Pneumonia  4.  Leukocytosis and fevers - now resolved    Continue capping trach as tolerated.  Continue diet.  Would not decannulate this hospital admission.  May follow up as outpatient with Dr. Morris for trach removal.  Discharge planning.    Discussed with Cardiothoracic Team at AM rounds.

## 2020-06-30 NOTE — CHART NOTE - NSCHARTNOTEFT_GEN_A_CORE
Assessment:     *pt admitted on 4/18 with COPD exacerbation--RRT called on 4/23 for AMS requiring intubation--extubated on 4/26 then re intubated on 4/29.  S/P Trach and PEG on 5/15.     *s/p SLP evaluation recommending dysphagia 1 and honey thick liquids. In addition pt receiving bolus TF Jevity 1.5 @ 400 mL q6hrs w/ 1 packet of prosource daily. RD discussed pt w/ RN who fed patient this afternoon. RN reports that pt consumed ~75% of breakfast this AM and 40-50% of lunch this afternoon with a few sips of ensure enlive. Recommend changing current TF order to nocturnal feeds to optimize intake during the day. Recommend changing TF order to Jevity 1.5 x 12 hours @ 65 mL/hr w/ 2 packets of prosource TF daily. This TF order + prosource will provide 1280 kcal, 72 g protein, and 593 mL free H20 and will meet 57% estimated kcal needs and 67% protein needs. Consider free H20 flush 50 mL/hr x 12 hours. Free H20 flush + free H20 from TF will provide 1193 mL free H20 meeting 53% estimated fluid needs. Recommend monitor hydration status and adjust free h20 flush / encourage po fluid intake PRN.     *as per RN pt can feed himself however has been observed to eat too fast. Pt is assisted w/ feedings to improve timing of meals .    *no new wt since 6/17.     *labs reviewed; phos remains slightly elevated however improving.     *last BM noted 6/29.     Recommendations:    1. change TF order to nocturnal feeds of Jevity 1.5 @ 65 mL/hr x 12 hours w/ 2 packets of prosource daily in addition to Dysphagia 1 diet w/ honey thick liquids.   2. consider free H20 flush 50 mL/hr x 12 hours and encourage HT po fluid intake   3. provide assistance w/ po intake and maintain aspiration precautions  4. monitor po intake/tolerance   5. add gelatein BID   6. weekly wt     Diet Presciption: Diet, Dysphagia 1 Pureed-Honey Consistency Fluid:   Tube Feeding Modality: Gastrostomy  Jevity 1.5 Randall (JEVITY1.5)  Total Volume for 24 Hours (mL): 1600  Bolus  Total Volume of Bolus (mL):  400  Total # of Feeds: 4  Tube Feed Frequency: Every 6 hours   Tube Feed Start Time: 00:00  Bolus Feed Rate (mL per Hour): 0   Bolus Feed Duration (in Hours): 0  Bolus Feed Instructions:  Provide 150 mL free water flush before/after each TF bolus. Provide 1 packet or prosource TF daily.  No Carb Prosource TF     Qty per Day:  1 packet daily (06-29-20 @ 14:51)      Wt Hx:    110.8Kg (6/17)  117.1Kg (5/9)  117.9Kg (4/18)    Estimated Needs:   [ ] no change since previous assessment    2250-2700Kcal (25-30Kcal/Kg of adjust BW; 90Kg)  108-126g protein (1.2-1.4g/Kg adjusted BW: 90kg)  2250-2700mL (25-30mL/Kg of adjusted BW: 90Kg)        Pertinent Medications: MEDICATIONS  (STANDING):  ALBUTerol    90 MICROgram(s) HFA Inhaler 2 Puff(s) Inhalation every 4 hours  aspirin  chewable 81 milliGRAM(s) Oral daily  budesonide 160 MICROgram(s)/formoterol 4.5 MICROgram(s) Inhaler 2 Puff(s) Inhalation two times a day  clonazePAM  Tablet 0.5 milliGRAM(s) Oral two times a day  clopidogrel Tablet 75 milliGRAM(s) Oral daily  diltiazem    Tablet 60 milliGRAM(s) Oral every 6 hours  doxazosin 2 milliGRAM(s) Oral at bedtime  enoxaparin Injectable 40 milliGRAM(s) SubCutaneous every 12 hours  famotidine    Tablet 20 milliGRAM(s) Oral two times a day  gabapentin   Solution 400 milliGRAM(s) Enteral Tube three times a day  melatonin 5 milliGRAM(s) Oral at bedtime  metoprolol tartrate 25 milliGRAM(s) Oral two times a day  polyethylene glycol 3350 17 Gram(s) Oral daily  QUEtiapine 50 milliGRAM(s) Oral <User Schedule>  QUEtiapine 50 milliGRAM(s) Oral <User Schedule>  QUEtiapine 200 milliGRAM(s) Oral at bedtime  senna 2 Tablet(s) Oral at bedtime  thiamine 100 milliGRAM(s) Oral daily  tiotropium 18 MICROgram(s) Capsule 1 Capsule(s) Inhalation daily    MEDICATIONS  (PRN):  acetaminophen   Tablet .. 650 milliGRAM(s) Oral every 6 hours PRN Temp greater or equal to 38.5C (101.3F)  ibuprofen  Tablet. 400 milliGRAM(s) Oral every 6 hours PRN Moderate Pain (4 - 6)    Pertinent Labs: 06-30 Na141 mmol/L Glu 86 mg/dL K+ 4.0 mmol/L Cr  0.58 mg/dL BUN 16 mg/dL 06-30 Phos 4.6 mg/dL<H>      Skin: karla score = 16,  1+ generalized edema, no skin breakdown, as per RN pressure ulcer on right buttock has healed.       Monitoring and Evaluation:   [x] PO intake/Nutr support infusion [ x ] Tolerance to Nutr [ x ] weights [ x ] labs[ x ] follow up per protocol  [ ] other: Assessment:     *pt admitted on 4/18 with COPD exacerbation--RRT called on 4/23 for AMS requiring intubation--extubated on 4/26 then re intubated on 4/29.  S/P Trach and PEG on 5/15.     *s/p SLP evaluation recommending dysphagia 1 and honey thick liquids. In addition pt receiving bolus TF Jevity 1.5 @ 400 mL q6hrs w/ 1 packet of prosource daily. RD discussed pt w/ RN who fed patient this afternoon. RN reports that pt consumed ~75% of breakfast this AM and 40-50% of lunch this afternoon with a few sips of ensure enlive. Recommend changing current TF order to nocturnal feeds to optimize intake during the day. Recommend changing TF order to Jevity 1.5 x 12 hours @ 65 mL/hr w/ 2 packets of prosource TF daily. This TF order + prosource will provide 1280 kcal, 72 g protein, and 593 mL free H20 and will meet 57% estimated kcal needs and 67% protein needs. Consider free H20 flush 50 mL/hr x 12 hours. Free H20 flush + free H20 from TF will provide 1193 mL free H20 meeting 53% estimated fluid needs. Recommend monitor hydration status and adjust free h20 flush / encourage po fluid intake PRN.     *as per RN pt can feed himself however has been observed to eat too fast. Pt is assisted w/ feedings to improve timing of meals .    *no new wt since 6/17.     *labs reviewed; phos remains slightly elevated however improving.     *last BM noted 6/29.     Recommendations:    1. change TF order to nocturnal feeds of Jevity 1.5 @ 65 mL/hr x 12 hours w/ 2 packets of prosource daily in addition to Dysphagia 1 diet w/ honey thick liquids.   2. consider free H20 flush 50 mL/hr x 12 hours and encourage HT po fluid intake  3. keep HOB > 45 degrees and monitor for s/s intolerance    4. provide assistance w/ po intake and maintain aspiration precautions   5. monitor po intake/tolerance   6. add gelatein BID   7. weekly wt     Diet Presciption: Diet, Dysphagia 1 Pureed-Honey Consistency Fluid:   Tube Feeding Modality: Gastrostomy  Jevity 1.5 Randall (JEVITY1.5)  Total Volume for 24 Hours (mL): 1600  Bolus  Total Volume of Bolus (mL):  400  Total # of Feeds: 4  Tube Feed Frequency: Every 6 hours   Tube Feed Start Time: 00:00  Bolus Feed Rate (mL per Hour): 0   Bolus Feed Duration (in Hours): 0  Bolus Feed Instructions:  Provide 150 mL free water flush before/after each TF bolus. Provide 1 packet or prosource TF daily.  No Carb Prosource TF     Qty per Day:  1 packet daily (06-29-20 @ 14:51)      Wt Hx:    110.8Kg (6/17)  117.1Kg (5/9)  117.9Kg (4/18)    Estimated Needs:   [ ] no change since previous assessment    2250-2700Kcal (25-30Kcal/Kg of adjust BW; 90Kg)  108-126g protein (1.2-1.4g/Kg adjusted BW: 90kg)  2250-2700mL (25-30mL/Kg of adjusted BW: 90Kg)        Pertinent Medications: MEDICATIONS  (STANDING):  ALBUTerol    90 MICROgram(s) HFA Inhaler 2 Puff(s) Inhalation every 4 hours  aspirin  chewable 81 milliGRAM(s) Oral daily  budesonide 160 MICROgram(s)/formoterol 4.5 MICROgram(s) Inhaler 2 Puff(s) Inhalation two times a day  clonazePAM  Tablet 0.5 milliGRAM(s) Oral two times a day  clopidogrel Tablet 75 milliGRAM(s) Oral daily  diltiazem    Tablet 60 milliGRAM(s) Oral every 6 hours  doxazosin 2 milliGRAM(s) Oral at bedtime  enoxaparin Injectable 40 milliGRAM(s) SubCutaneous every 12 hours  famotidine    Tablet 20 milliGRAM(s) Oral two times a day  gabapentin   Solution 400 milliGRAM(s) Enteral Tube three times a day  melatonin 5 milliGRAM(s) Oral at bedtime  metoprolol tartrate 25 milliGRAM(s) Oral two times a day  polyethylene glycol 3350 17 Gram(s) Oral daily  QUEtiapine 50 milliGRAM(s) Oral <User Schedule>  QUEtiapine 50 milliGRAM(s) Oral <User Schedule>  QUEtiapine 200 milliGRAM(s) Oral at bedtime  senna 2 Tablet(s) Oral at bedtime  thiamine 100 milliGRAM(s) Oral daily  tiotropium 18 MICROgram(s) Capsule 1 Capsule(s) Inhalation daily    MEDICATIONS  (PRN):  acetaminophen   Tablet .. 650 milliGRAM(s) Oral every 6 hours PRN Temp greater or equal to 38.5C (101.3F)  ibuprofen  Tablet. 400 milliGRAM(s) Oral every 6 hours PRN Moderate Pain (4 - 6)    Pertinent Labs: 06-30 Na141 mmol/L Glu 86 mg/dL K+ 4.0 mmol/L Cr  0.58 mg/dL BUN 16 mg/dL 06-30 Phos 4.6 mg/dL<H>      Skin: karla score = 16,  1+ generalized edema, no skin breakdown, as per RN pressure ulcer on right buttock has healed.       Monitoring and Evaluation:   [x] PO intake/Nutr support infusion [ x ] Tolerance to Nutr [ x ] weights [ x ] labs[ x ] follow up per protocol  [ ] other: Assessment:     *pt admitted on 4/18 with COPD exacerbation--RRT called on 4/23 for AMS requiring intubation--extubated on 4/26 then re intubated on 4/29.  S/P Trach and PEG on 5/15.     *s/p SLP evaluation recommending dysphagia 1 and honey thick liquids. In addition pt receiving bolus TF Jevity 1.5 @ 400 mL q6hrs w/ 1 packet of prosource daily. RD discussed pt w/ RN who fed patient this afternoon. RN reports that pt consumed ~75% of breakfast this AM and 40-50% of lunch this afternoon with a few sips of ensure enlive. Recommend changing current TF order to nocturnal feeds to optimize intake during the day. Recommend changing TF order to Jevity 1.5 x 12 hours @ 65 mL/hr w/ 2 packets of prosource TF daily. This TF order + prosource will provide 1280 kcal, 72 g protein, and 593 mL free H20 and will meet 57% estimated kcal needs and 67% protein needs. Consider free H20 flush 100 mL/hr x 12 hours. Free H20 flush + free H20 from TF will provide 1793 mL free H20 meeting 80% estimated fluid needs. Recommend monitor hydration status and adjust free h20 flush / encourage po fluid intake PRN.     *as per RN pt can feed himself however has been observed to eat too fast. Pt is assisted w/ feedings to improve timing of meals .    *no new wt since 6/17.     *labs reviewed; phos remains slightly elevated however improving.     *last BM noted 6/29.     Recommendations:    1. change TF order to nocturnal feeds of Jevity 1.5 @ 65 mL/hr x 12 hours w/ 2 packets of prosource daily in addition to Dysphagia 1 diet w/ honey thick liquids.   2. consider free H20 flush 100 mL/hr x 12 hours and encourage HT po fluid intake  3. keep HOB > 45 degrees and monitor for s/s intolerance    4. provide assistance w/ po intake and maintain aspiration precautions   5. monitor po intake/tolerance   6. add gelatein BID   7. weekly wt     Diet Presciption: Diet, Dysphagia 1 Pureed-Honey Consistency Fluid:   Tube Feeding Modality: Gastrostomy  Jevity 1.5 Randall (JEVITY1.5)  Total Volume for 24 Hours (mL): 1600  Bolus  Total Volume of Bolus (mL):  400  Total # of Feeds: 4  Tube Feed Frequency: Every 6 hours   Tube Feed Start Time: 00:00  Bolus Feed Rate (mL per Hour): 0   Bolus Feed Duration (in Hours): 0  Bolus Feed Instructions:  Provide 150 mL free water flush before/after each TF bolus. Provide 1 packet or prosource TF daily.  No Carb Prosource TF     Qty per Day:  1 packet daily (06-29-20 @ 14:51)      Wt Hx:    110.8Kg (6/17)  117.1Kg (5/9)  117.9Kg (4/18)    Estimated Needs:   [ ] no change since previous assessment    2250-2700Kcal (25-30Kcal/Kg of adjust BW; 90Kg)  108-126g protein (1.2-1.4g/Kg adjusted BW: 90kg)  2250-2700mL (25-30mL/Kg of adjusted BW: 90Kg)        Pertinent Medications: MEDICATIONS  (STANDING):  ALBUTerol    90 MICROgram(s) HFA Inhaler 2 Puff(s) Inhalation every 4 hours  aspirin  chewable 81 milliGRAM(s) Oral daily  budesonide 160 MICROgram(s)/formoterol 4.5 MICROgram(s) Inhaler 2 Puff(s) Inhalation two times a day  clonazePAM  Tablet 0.5 milliGRAM(s) Oral two times a day  clopidogrel Tablet 75 milliGRAM(s) Oral daily  diltiazem    Tablet 60 milliGRAM(s) Oral every 6 hours  doxazosin 2 milliGRAM(s) Oral at bedtime  enoxaparin Injectable 40 milliGRAM(s) SubCutaneous every 12 hours  famotidine    Tablet 20 milliGRAM(s) Oral two times a day  gabapentin   Solution 400 milliGRAM(s) Enteral Tube three times a day  melatonin 5 milliGRAM(s) Oral at bedtime  metoprolol tartrate 25 milliGRAM(s) Oral two times a day  polyethylene glycol 3350 17 Gram(s) Oral daily  QUEtiapine 50 milliGRAM(s) Oral <User Schedule>  QUEtiapine 50 milliGRAM(s) Oral <User Schedule>  QUEtiapine 200 milliGRAM(s) Oral at bedtime  senna 2 Tablet(s) Oral at bedtime  thiamine 100 milliGRAM(s) Oral daily  tiotropium 18 MICROgram(s) Capsule 1 Capsule(s) Inhalation daily    MEDICATIONS  (PRN):  acetaminophen   Tablet .. 650 milliGRAM(s) Oral every 6 hours PRN Temp greater or equal to 38.5C (101.3F)  ibuprofen  Tablet. 400 milliGRAM(s) Oral every 6 hours PRN Moderate Pain (4 - 6)    Pertinent Labs: 06-30 Na141 mmol/L Glu 86 mg/dL K+ 4.0 mmol/L Cr  0.58 mg/dL BUN 16 mg/dL 06-30 Phos 4.6 mg/dL<H>      Skin: karla score = 16,  1+ generalized edema, no skin breakdown, as per RN pressure ulcer on right buttock has healed.       Monitoring and Evaluation:   [x] PO intake/Nutr support infusion [ x ] Tolerance to Nutr [ x ] weights [ x ] labs[ x ] follow up per protocol  [ ] other:

## 2020-06-30 NOTE — PROGRESS NOTE ADULT - SUBJECTIVE AND OBJECTIVE BOX
HPI: Patient seen at a  this morning for AMS and hypoxic  Patient had been admitted with a COPD exacerbation.  It was believed that he went into ETOH withdrawal and had a total of 6MG Ativan over night.  He required intubation 4/23.    4/24: he remains intubated.  Will try to wean this afternoon.      4/25: He weaned this morning and failed with a decreased Tv.  But close to extubation.    4/26: Patient weaned well this morning and was extubated.      5/2: Chart reviewed, Patient was reintubated, failed weaning this morning, CXR with improving infiltrates  5/3: Patient remains on the vent failed weaning x3 today with increased RR and low TV, Off Levophed now  5/4: Failed weaning yesterday, had to change his ET tube yesterday because he chewed through it,     5/5: Pt on Precedex and Diprivan for sedation, Patient failed weaning again today  5/6: Patient failed weaning today with a low TV and High RR  5/7: He again failed weaning, NGT replaced, Fi O2 back to 40%, WBC normal,     5/8: For weaning today, he has failed weaning daily  5/9: Weaning this morning off levo  5/10: He Failed weaning this morning    5/14: Patient failed weaning again this morning.  For Trach and PEG in 5/15    5/17: S/P Trach and PEG.  Weaned for several hours yesterday  5/18: Weaned for under an hour yesterday but desated.  Temps to 104 x 2 over night.    5/19: No Weaning yesterday because of the ongoing temps.  Now with MRSA in the Blood.    5/20: Temps down, now off precedex  5/21: Failed weaning yesterday, BC remain positive      6/29: Chart reviewed.  Agitation noted over night.  Patient now on TC.  He is awake this morning.  No temps WBC normal   6/30: Patient has been off restraints since yesterday, he is now able to take PO, no temps              PAST MEDICAL HX  Alcohol abuse    Alcohol withdrawal    Anemia    AFIB atrial fibrillation    CHF (congestive heart failure)    Chronic atrial fibrillation    Chronic CHF    Chronic obstructive pulmonary disease (COPD)    Cirrhosis    Collapsed lung    COPD without exacerbation    Emphysema of lung    Falls    GERD (gastroesophageal reflux disease)    HTN hypertension    Meningitis    Poor historian    Presence of Watchman left atrial appendage closure device.    PAST SURGICAL HX  Presence of Watchman left atrial appendage closure device    S/P BKA (below knee amputation) unilateral, left    Unilateral amputation of lower extremity below knee.      FAMILY HX  Family history unknown: Adopted      SOCIAL HX  lives alone  former smoker  alcohol use last 2-3 days ago (19 Apr 2020 03:19)       PAST MEDICAL & SURGICAL HISTORY:  Chronic CHF  COPD without exacerbation  Atrial fibrillation  Presence of Watchman left atrial appendage closure device  Hypertension  GERD (gastroesophageal reflux disease)  Chronic obstructive pulmonary disease (COPD)  Anemia  Falls  Meningitis  Collapsed lung  Alcohol withdrawal  Emphysema of lung  Cirrhosis  CHF (congestive heart failure)  Poor historian  Alcohol abuse  Chronic atrial fibrillation  Unilateral amputation of lower extremity below knee  Presence of Watchman left atrial appendage closure device  S/P BKA (below knee amputation) unilateral, left      FAMILY HISTORY:  No pertinent family history in first degree relatives  Family history unknown: Adopted      Social Hx:    Allergies    Ceclor (Rash)  Duricef (Rash)    Intolerances            ICU Vital Signs Last 24 Hrs  T(C): 36.9 (30 Jun 2020 06:00), Max: 36.9 (29 Jun 2020 21:00)  T(F): 98.4 (30 Jun 2020 06:00), Max: 98.4 (29 Jun 2020 21:00)  HR: 81 (30 Jun 2020 09:25) (73 - 95)  BP: 128/88 (30 Jun 2020 09:00) (95/53 - 153/87)  BP(mean): 87 (30 Jun 2020 09:00) (55 - 100)  ABP: --  ABP(mean): --  RR: 25 (30 Jun 2020 09:00) (20 - 35)  SpO2: 97% (30 Jun 2020 09:25) (89% - 100%)          I&O's Summary    29 Jun 2020 07:01  -  30 Jun 2020 07:00  --------------------------------------------------------  IN: 600 mL / OUT: 250 mL / NET: 350 mL                              10.2   6.57  )-----------( 203      ( 30 Jun 2020 07:25 )             31.7       06-30    141  |  106  |  16  ----------------------------<  86  4.0   |  30  |  0.58    Ca    9.0      30 Jun 2020 07:25  Phos  4.6     06-30  Mg     2.0     06-30                      MEDICATIONS  (STANDING):  ALBUTerol    90 MICROgram(s) HFA Inhaler 2 Puff(s) Inhalation every 4 hours  aspirin  chewable 81 milliGRAM(s) Oral daily  budesonide 160 MICROgram(s)/formoterol 4.5 MICROgram(s) Inhaler 2 Puff(s) Inhalation two times a day  clonazePAM  Tablet 0.5 milliGRAM(s) Oral two times a day  clopidogrel Tablet 75 milliGRAM(s) Oral daily  diltiazem    Tablet 60 milliGRAM(s) Oral every 6 hours  doxazosin 2 milliGRAM(s) Oral at bedtime  enoxaparin Injectable 40 milliGRAM(s) SubCutaneous every 12 hours  famotidine    Tablet 20 milliGRAM(s) Oral two times a day  gabapentin   Solution 400 milliGRAM(s) Enteral Tube three times a day  melatonin 5 milliGRAM(s) Oral at bedtime  metoprolol tartrate 25 milliGRAM(s) Oral two times a day  polyethylene glycol 3350 17 Gram(s) Oral daily  QUEtiapine 50 milliGRAM(s) Oral <User Schedule>  QUEtiapine 50 milliGRAM(s) Oral <User Schedule>  QUEtiapine 200 milliGRAM(s) Oral at bedtime  senna 2 Tablet(s) Oral at bedtime  thiamine 100 milliGRAM(s) Oral daily  tiotropium 18 MICROgram(s) Capsule 1 Capsule(s) Inhalation daily    MEDICATIONS  (PRN):  acetaminophen   Tablet .. 650 milliGRAM(s) Oral every 6 hours PRN Temp greater or equal to 38.5C (101.3F)  ibuprofen  Tablet. 400 milliGRAM(s) Oral every 6 hours PRN Moderate Pain (4 - 6)      DVT Prophylaxis:    Advanced Directives:  Discussed with:    Visit Information:    ** Time is exclusive of billed procedures and/or teaching and/or routine family updates.

## 2020-06-30 NOTE — PROGRESS NOTE ADULT - SUBJECTIVE AND OBJECTIVE BOX
Subjective:    pat better, in chair, taking po diet, no respiratory complaint.    Home Medications:  acetaminophen 325 mg oral tablet: 2 tab(s) orally every 6 hours, As needed, Temp greater or equal to 38.5C (101.3F) (02 Jun 2020 12:53)  albuterol 90 mcg/inh inhalation aerosol: 2 puff(s) inhaled every 4 hours (02 Jun 2020 12:53)  ALPRAZolam 0.5 mg oral tablet: 1 tab(s) orally every 6 hours (02 Jun 2020 12:53)  aspirin 81 mg oral tablet, chewable: 1 tab(s) orally once a day (02 Jun 2020 12:53)  clopidogrel 75 mg oral tablet: 1 tab(s) orally once a day (02 Jun 2020 12:53)  dilTIAZem 90 mg oral tablet: 1 tab(s) orally every 6 hours (02 Jun 2020 12:53)  doxazosin 2 mg oral tablet: 1 tab(s) orally once a day (at bedtime) (02 Jun 2020 12:53)  gabapentin 400 mg oral capsule: 1 cap(s) orally 3 times a day (02 Jun 2020 12:53)  pantoprazole 40 mg oral granule, enteric coated: 40 milligram(s) orally once a day (19 Apr 2020 03:12)  QUEtiapine 100 mg oral tablet: 1 tab(s) orally once a day (at bedtime) (02 Jun 2020 12:53)  QUEtiapine 50 mg oral tablet: 1 tab(s) orally once a day (02 Jun 2020 12:53)  Spiriva 18 mcg inhalation capsule: 1 cap(s) inhaled once a day (19 Apr 2020 03:12)  thiamine 100 mg oral tablet: 1 tab(s) orally once a day (02 Jun 2020 12:53)    MEDICATIONS  (STANDING):  ALBUTerol    90 MICROgram(s) HFA Inhaler 2 Puff(s) Inhalation every 4 hours  aspirin  chewable 81 milliGRAM(s) Oral daily  budesonide 160 MICROgram(s)/formoterol 4.5 MICROgram(s) Inhaler 2 Puff(s) Inhalation two times a day  clonazePAM  Tablet 0.5 milliGRAM(s) Oral two times a day  clopidogrel Tablet 75 milliGRAM(s) Oral daily  diltiazem    Tablet 60 milliGRAM(s) Oral every 6 hours  doxazosin 2 milliGRAM(s) Oral at bedtime  enoxaparin Injectable 40 milliGRAM(s) SubCutaneous every 12 hours  famotidine    Tablet 20 milliGRAM(s) Oral two times a day  gabapentin   Solution 400 milliGRAM(s) Enteral Tube three times a day  melatonin 5 milliGRAM(s) Oral at bedtime  metoprolol tartrate 25 milliGRAM(s) Oral two times a day  polyethylene glycol 3350 17 Gram(s) Oral daily  QUEtiapine 50 milliGRAM(s) Oral <User Schedule>  QUEtiapine 50 milliGRAM(s) Oral <User Schedule>  QUEtiapine 200 milliGRAM(s) Oral at bedtime  senna 2 Tablet(s) Oral at bedtime  thiamine 100 milliGRAM(s) Oral daily  tiotropium 18 MICROgram(s) Capsule 1 Capsule(s) Inhalation daily    MEDICATIONS  (PRN):  acetaminophen   Tablet .. 650 milliGRAM(s) Oral every 6 hours PRN Temp greater or equal to 38.5C (101.3F)  ibuprofen  Tablet. 400 milliGRAM(s) Oral every 6 hours PRN Moderate Pain (4 - 6)      Allergies    Ceclor (Rash)  Duricef (Rash)    Intolerances        Vital Signs Last 24 Hrs  T(C): 36.8 (30 Jun 2020 10:00), Max: 36.9 (29 Jun 2020 21:00)  T(F): 98.2 (30 Jun 2020 10:00), Max: 98.4 (29 Jun 2020 21:00)  HR: 76 (30 Jun 2020 13:00) (73 - 95)  BP: 98/57 (30 Jun 2020 13:00) (95/53 - 153/87)  BP(mean): 64 (30 Jun 2020 13:00) (55 - 100)  RR: 30 (30 Jun 2020 13:00) (20 - 35)  SpO2: 95% (30 Jun 2020 13:00) (89% - 100%)      PHYSICAL EXAMINATION:    NECK:  Supple. No lymphadenopathy. Jugular venous pressure not elevated. Carotids equal.   HEART:   The cardiac impulse has a normal quality. Reg., Nl S1 and S2.  There are no murmurs, rubs or gallops noted  CHEST:  Chest is clear to auscultation. Normal respiratory effort.  ABDOMEN:  Soft and nontender.   EXTREMITIES:  There is no edema.       LABS:                        10.2   6.57  )-----------( 203      ( 30 Jun 2020 07:25 )             31.7     06-30    141  |  106  |  16  ----------------------------<  86  4.0   |  30  |  0.58    Ca    9.0      30 Jun 2020 07:25  Phos  4.6     06-30  Mg     2.0     06-30

## 2020-06-30 NOTE — PROGRESS NOTE ADULT - ASSESSMENT
A/P: 65 male with acute respiratory failure from altered awareness and sedation  COPD  AFIB s/p watchman  CHF  HTN  ETOH abuse    Plan:   PULM: S/P Trach, with a cuffless #6 fenestrated    Cardio: Continue ASA and Plavix.  Continue Cardizem and Lopressor for AFIB     Renal: Cr. Stable    GI: Continue bolus feeds as well as Dysphagia I and honey thick liquids    PSY: Thiamine for chronic ETOH abuse and thiamine deficiency folate, Seroquel and Klonopin for agitation      Lovenox DVT Prophylaxis    PT    Needs placement

## 2020-06-30 NOTE — PROGRESS NOTE ADULT - ASSESSMENT
PROBLEMS;    Febrile illness- GNR in sputum-pseudomonas/proteus-off abx  s/p staph bactermia/sputum pseudomonas/staph  Ac on chronic hypercapnic & hypoxamic respiratory failure-s/p trach & PEG  sputum-Few Pseudomonas aeruginosa (Carbapenem Resistant)/Moderate Methicillin resistant Staphylococcus aureus  afib with RVR  OHS/VIJAY  AC flare of COPD/chronic vs acute on chronic bronchitis  Mild interstitial lung disease-NSIP h/o smoking  COVID negativex2  Pulmonary hypertension  Nonobstructing stone in the left ureterovesicular junction/ nonobstructing stone in the lower pole right kidney.  Subacute hemorrhage in the right rectus abdominis along the anterior sheath, measures 1.6 cm in thickness and extends from the umbilicus to the inferior myotendinous junction  Cirrhosis  Mild splenomegaly-portal venous hypertension.  Chronic afib w Watchman device  CHF  BPH  GERD  ETOH abuse  seizures disorder    PLAN;    pulmonary stable  Trach on RA  trach suction  Tube feeding as tolerated  supportive care  covid repeat testing-neg  Eliquis/asa 81  d/w staff

## 2020-07-01 PROCEDURE — 99232 SBSQ HOSP IP/OBS MODERATE 35: CPT

## 2020-07-01 RX ADMIN — Medication 100 MILLIGRAM(S): at 09:55

## 2020-07-01 RX ADMIN — GABAPENTIN 400 MILLIGRAM(S): 400 CAPSULE ORAL at 06:36

## 2020-07-01 RX ADMIN — QUETIAPINE FUMARATE 200 MILLIGRAM(S): 200 TABLET, FILM COATED ORAL at 21:10

## 2020-07-01 RX ADMIN — BUDESONIDE AND FORMOTEROL FUMARATE DIHYDRATE 2 PUFF(S): 160; 4.5 AEROSOL RESPIRATORY (INHALATION) at 09:20

## 2020-07-01 RX ADMIN — CLOPIDOGREL BISULFATE 75 MILLIGRAM(S): 75 TABLET, FILM COATED ORAL at 09:55

## 2020-07-01 RX ADMIN — ALBUTEROL 2 PUFF(S): 90 AEROSOL, METERED ORAL at 12:02

## 2020-07-01 RX ADMIN — POLYETHYLENE GLYCOL 3350 17 GRAM(S): 17 POWDER, FOR SOLUTION ORAL at 09:56

## 2020-07-01 RX ADMIN — SENNA PLUS 2 TABLET(S): 8.6 TABLET ORAL at 21:10

## 2020-07-01 RX ADMIN — QUETIAPINE FUMARATE 50 MILLIGRAM(S): 200 TABLET, FILM COATED ORAL at 08:01

## 2020-07-01 RX ADMIN — Medication 5 MILLIGRAM(S): at 21:10

## 2020-07-01 RX ADMIN — ALBUTEROL 2 PUFF(S): 90 AEROSOL, METERED ORAL at 00:31

## 2020-07-01 RX ADMIN — Medication 60 MILLIGRAM(S): at 17:33

## 2020-07-01 RX ADMIN — Medication 25 MILLIGRAM(S): at 23:47

## 2020-07-01 RX ADMIN — QUETIAPINE FUMARATE 50 MILLIGRAM(S): 200 TABLET, FILM COATED ORAL at 15:44

## 2020-07-01 RX ADMIN — ALBUTEROL 2 PUFF(S): 90 AEROSOL, METERED ORAL at 04:08

## 2020-07-01 RX ADMIN — Medication 0.5 MILLIGRAM(S): at 21:09

## 2020-07-01 RX ADMIN — FAMOTIDINE 20 MILLIGRAM(S): 10 INJECTION INTRAVENOUS at 09:55

## 2020-07-01 RX ADMIN — Medication 25 MILLIGRAM(S): at 13:46

## 2020-07-01 RX ADMIN — Medication 81 MILLIGRAM(S): at 09:55

## 2020-07-01 RX ADMIN — Medication 60 MILLIGRAM(S): at 13:46

## 2020-07-01 RX ADMIN — FAMOTIDINE 20 MILLIGRAM(S): 10 INJECTION INTRAVENOUS at 21:10

## 2020-07-01 RX ADMIN — ENOXAPARIN SODIUM 40 MILLIGRAM(S): 100 INJECTION SUBCUTANEOUS at 09:56

## 2020-07-01 RX ADMIN — GABAPENTIN 400 MILLIGRAM(S): 400 CAPSULE ORAL at 13:46

## 2020-07-01 RX ADMIN — ALBUTEROL 2 PUFF(S): 90 AEROSOL, METERED ORAL at 17:55

## 2020-07-01 RX ADMIN — GABAPENTIN 400 MILLIGRAM(S): 400 CAPSULE ORAL at 21:10

## 2020-07-01 RX ADMIN — Medication 60 MILLIGRAM(S): at 23:49

## 2020-07-01 RX ADMIN — Medication 0.5 MILLIGRAM(S): at 09:55

## 2020-07-01 RX ADMIN — ENOXAPARIN SODIUM 40 MILLIGRAM(S): 100 INJECTION SUBCUTANEOUS at 21:11

## 2020-07-01 RX ADMIN — Medication 2 MILLIGRAM(S): at 21:12

## 2020-07-01 RX ADMIN — Medication 60 MILLIGRAM(S): at 06:37

## 2020-07-01 RX ADMIN — Medication 400 MILLIGRAM(S): at 13:45

## 2020-07-01 RX ADMIN — ALBUTEROL 2 PUFF(S): 90 AEROSOL, METERED ORAL at 09:18

## 2020-07-01 NOTE — PROGRESS NOTE ADULT - ASSESSMENT
A/P: 65 male with acute respiratory failure from altered awareness and sedation  COPD  AFIB s/p watchman  CHF  HTN  ETOH abuse    Plan:   PULM: S/P Trach, with a cuffless #6 fenestrated    Cardio: Continue ASA and Plavix.  Continue Cardizem and Lopressor for AFIB     Renal: Cr. Stable    GI: Continue bolus feeds but will D/C the Dysphagia I and honey thick liquids    PSY: Thiamine for chronic ETOH abuse and thiamine deficiency folate, Seroquel and Klonopin for agitation      Lovenox DVT Prophylaxis    PT    Needs placement

## 2020-07-01 NOTE — PROGRESS NOTE ADULT - SUBJECTIVE AND OBJECTIVE BOX
Subjective:    pat increased trach secreations & swallowing issues, no respiratory distress.    Home Medications:  acetaminophen 325 mg oral tablet: 2 tab(s) orally every 6 hours, As needed, Temp greater or equal to 38.5C (101.3F) (02 Jun 2020 12:53)  albuterol 90 mcg/inh inhalation aerosol: 2 puff(s) inhaled every 4 hours (02 Jun 2020 12:53)  ALPRAZolam 0.5 mg oral tablet: 1 tab(s) orally every 6 hours (02 Jun 2020 12:53)  aspirin 81 mg oral tablet, chewable: 1 tab(s) orally once a day (02 Jun 2020 12:53)  clopidogrel 75 mg oral tablet: 1 tab(s) orally once a day (02 Jun 2020 12:53)  dilTIAZem 90 mg oral tablet: 1 tab(s) orally every 6 hours (02 Jun 2020 12:53)  doxazosin 2 mg oral tablet: 1 tab(s) orally once a day (at bedtime) (02 Jun 2020 12:53)  gabapentin 400 mg oral capsule: 1 cap(s) orally 3 times a day (02 Jun 2020 12:53)  pantoprazole 40 mg oral granule, enteric coated: 40 milligram(s) orally once a day (19 Apr 2020 03:12)  QUEtiapine 100 mg oral tablet: 1 tab(s) orally once a day (at bedtime) (02 Jun 2020 12:53)  QUEtiapine 50 mg oral tablet: 1 tab(s) orally once a day (02 Jun 2020 12:53)  Spiriva 18 mcg inhalation capsule: 1 cap(s) inhaled once a day (19 Apr 2020 03:12)  thiamine 100 mg oral tablet: 1 tab(s) orally once a day (02 Jun 2020 12:53)    MEDICATIONS  (STANDING):  ALBUTerol    90 MICROgram(s) HFA Inhaler 2 Puff(s) Inhalation every 4 hours  aspirin  chewable 81 milliGRAM(s) Oral daily  budesonide 160 MICROgram(s)/formoterol 4.5 MICROgram(s) Inhaler 2 Puff(s) Inhalation two times a day  clonazePAM  Tablet 0.5 milliGRAM(s) Oral two times a day  clopidogrel Tablet 75 milliGRAM(s) Oral daily  diltiazem    Tablet 60 milliGRAM(s) Oral every 6 hours  doxazosin 2 milliGRAM(s) Oral at bedtime  enoxaparin Injectable 40 milliGRAM(s) SubCutaneous every 12 hours  famotidine    Tablet 20 milliGRAM(s) Oral two times a day  gabapentin   Solution 400 milliGRAM(s) Enteral Tube three times a day  melatonin 5 milliGRAM(s) Oral at bedtime  metoprolol tartrate 25 milliGRAM(s) Oral two times a day  polyethylene glycol 3350 17 Gram(s) Oral daily  QUEtiapine 50 milliGRAM(s) Oral <User Schedule>  QUEtiapine 50 milliGRAM(s) Oral <User Schedule>  QUEtiapine 200 milliGRAM(s) Oral at bedtime  senna 2 Tablet(s) Oral at bedtime  thiamine 100 milliGRAM(s) Oral daily  tiotropium 18 MICROgram(s) Capsule 1 Capsule(s) Inhalation daily    MEDICATIONS  (PRN):  acetaminophen   Tablet .. 650 milliGRAM(s) Oral every 6 hours PRN Temp greater or equal to 38.5C (101.3F)  ibuprofen  Tablet. 400 milliGRAM(s) Oral every 6 hours PRN Moderate Pain (4 - 6)      Allergies    Ceclor (Rash)  Duricef (Rash)    Intolerances        Vital Signs Last 24 Hrs  T(C): 36.7 (01 Jul 2020 10:00), Max: 37.7 (30 Jun 2020 22:00)  T(F): 98.1 (01 Jul 2020 10:00), Max: 99.9 (30 Jun 2020 22:00)  HR: 88 (01 Jul 2020 14:00) (70 - 119)  BP: 122/73 (01 Jul 2020 14:00) (11/62 - 132/72)  BP(mean): 85 (01 Jul 2020 14:00) (60 - 88)  RR: 23 (01 Jul 2020 14:00) (17 - 34)  SpO2: 96% (01 Jul 2020 14:00) (88% - 99%)      PHYSICAL EXAMINATION:    NECK:  Supple. No lymphadenopathy. Jugular venous pressure not elevated. Carotids equal.   HEART:   The cardiac impulse has a normal quality. Reg., Nl S1 and S2.  There are no murmurs, rubs or gallops noted  CHEST:  Chest is clear to auscultation. Normal respiratory effort.  ABDOMEN:  Soft and nontender.   EXTREMITIES:  There is no edema.       LABS:                        10.2   6.57  )-----------( 203      ( 30 Jun 2020 07:25 )             31.7     06-30    141  |  106  |  16  ----------------------------<  86  4.0   |  30  |  0.58    Ca    9.0      30 Jun 2020 07:25  Phos  4.6     06-30  Mg     2.0     06-30

## 2020-07-01 NOTE — PROGRESS NOTE ADULT - SUBJECTIVE AND OBJECTIVE BOX
Subjective:   No acute events overnight.    Vital Signs:  Vital Signs Last 24 Hrs  T(C): 37.1 (07-01-20 @ 06:00), Max: 37.7 (06-30-20 @ 22:00)  T(F): 98.8 (07-01-20 @ 06:00), Max: 99.9 (06-30-20 @ 22:00)  HR: 93 (07-01-20 @ 08:00) (70 - 119)  BP: 123/52 (07-01-20 @ 08:00) (11/62 - 132/72)  RR: 25 (07-01-20 @ 08:00) (17 - 34)  SpO2: 98% (07-01-20 @ 08:00) (90% - 99%) on room air    Telemetry/Alarms:  atrial fibrillation    Relevant labs, radiology and Medications reviewed                        10.2   6.57  )-----------( 203      ( 30 Jun 2020 07:25 )             31.7     06-30    141  |  106  |  16  ----------------------------<  86  4.0   |  30  |  0.58    Ca    9.0      30 Jun 2020 07:25  Phos  4.6     06-30  Mg     2.0     06-30        MEDICATIONS  (STANDING):  ALBUTerol    90 MICROgram(s) HFA Inhaler 2 Puff(s) Inhalation every 4 hours  aspirin  chewable 81 milliGRAM(s) Oral daily  budesonide 160 MICROgram(s)/formoterol 4.5 MICROgram(s) Inhaler 2 Puff(s) Inhalation two times a day  clonazePAM  Tablet 0.5 milliGRAM(s) Oral two times a day  clopidogrel Tablet 75 milliGRAM(s) Oral daily  diltiazem    Tablet 60 milliGRAM(s) Oral every 6 hours  doxazosin 2 milliGRAM(s) Oral at bedtime  enoxaparin Injectable 40 milliGRAM(s) SubCutaneous every 12 hours  famotidine    Tablet 20 milliGRAM(s) Oral two times a day  gabapentin   Solution 400 milliGRAM(s) Enteral Tube three times a day  melatonin 5 milliGRAM(s) Oral at bedtime  metoprolol tartrate 25 milliGRAM(s) Oral two times a day  polyethylene glycol 3350 17 Gram(s) Oral daily  QUEtiapine 50 milliGRAM(s) Oral <User Schedule>  QUEtiapine 50 milliGRAM(s) Oral <User Schedule>  QUEtiapine 200 milliGRAM(s) Oral at bedtime  senna 2 Tablet(s) Oral at bedtime  thiamine 100 milliGRAM(s) Oral daily  tiotropium 18 MICROgram(s) Capsule 1 Capsule(s) Inhalation daily    MEDICATIONS  (PRN):  acetaminophen   Tablet .. 650 milliGRAM(s) Oral every 6 hours PRN Temp greater or equal to 38.5C (101.3F)  ibuprofen  Tablet. 400 milliGRAM(s) Oral every 6 hours PRN Moderate Pain (4 - 6)      Physical exam  Gen: NAD resting comfortably  Neuro: AO   Card: S1 S2  Pulm: equal bilaterally  Abd: Soft PEG in place  Ext: No edema      I&O's Summary    30 Jun 2020 07:01  -  01 Jul 2020 07:00  --------------------------------------------------------  IN: 85 mL / OUT: 1000 mL / NET: -915 mL        Assessment  65y Male  w/ PAST MEDICAL & SURGICAL HISTORY:  Chronic CHF  COPD without exacerbation  Atrial fibrillation  Presence of Watchman left atrial appendage closure device  Hypertension  GERD (gastroesophageal reflux disease)  Chronic obstructive pulmonary disease (COPD)  Anemia  Falls  Meningitis  Collapsed lung  Alcohol withdrawal  Emphysema of lung  Cirrhosis  CHF (congestive heart failure)  Poor historian  Alcohol abuse  Chronic atrial fibrillation  Unilateral amputation of lower extremity below knee  Presence of Watchman left atrial appendage closure device  S/P BKA (below knee amputation) unilateral, left    Patient is a 65y old  Male who presents with a chief complaint of acute hypoxemic, hypercapneic resp failure  COPD exacerbation (30 Jun 2020 13:13)    1.  Chronic respiratory failure  2.  COPD exacerbation  3.  Pneumonia  4.  Leukocytosis and fevers - now resolved    Continue capping trach as tolerated.  Continue diet.  Would not decannulate this hospital admission.  May follow up as outpatient with Dr. Morris for trach removal.  Discharge planning.    Discussed with Cardiothoracic Team at AM rounds.

## 2020-07-01 NOTE — PROGRESS NOTE ADULT - SUBJECTIVE AND OBJECTIVE BOX
HPI: Patient seen at a  this morning for AMS and hypoxic  Patient had been admitted with a COPD exacerbation.  It was believed that he went into ETOH withdrawal and had a total of 6MG Ativan over night.  He required intubation 4/23.    4/24: he remains intubated.  Will try to wean this afternoon.      4/25: He weaned this morning and failed with a decreased Tv.  But close to extubation.    4/26: Patient weaned well this morning and was extubated.      5/2: Chart reviewed, Patient was reintubated, failed weaning this morning, CXR with improving infiltrates  5/3: Patient remains on the vent failed weaning x3 today with increased RR and low TV, Off Levophed now  5/4: Failed weaning yesterday, had to change his ET tube yesterday because he chewed through it,     5/5: Pt on Precedex and Diprivan for sedation, Patient failed weaning again today  5/6: Patient failed weaning today with a low TV and High RR  5/7: He again failed weaning, NGT replaced, Fi O2 back to 40%, WBC normal,     5/8: For weaning today, he has failed weaning daily  5/9: Weaning this morning off levo  5/10: He Failed weaning this morning    5/14: Patient failed weaning again this morning.  For Trach and PEG in 5/15    5/17: S/P Trach and PEG.  Weaned for several hours yesterday  5/18: Weaned for under an hour yesterday but desated.  Temps to 104 x 2 over night.    5/19: No Weaning yesterday because of the ongoing temps.  Now with MRSA in the Blood.    5/20: Temps down, now off precedex  5/21: Failed weaning yesterday, BC remain positive      6/29: Chart reviewed.  Agitation noted over night.  Patient now on TC.  He is awake this morning.  No temps WBC normal   6/30: Patient has been off restraints since yesterday, he is now able to take PO, no temps  7/1: Patient still confused at times, continues to be off restraints, there was of a question of aspiration of his dysphagia diet yesterday, no temps              PAST MEDICAL HX  Alcohol abuse    Alcohol withdrawal    Anemia    AFIB atrial fibrillation    CHF (congestive heart failure)    Chronic atrial fibrillation    Chronic CHF    Chronic obstructive pulmonary disease (COPD)    Cirrhosis    Collapsed lung    COPD without exacerbation    Emphysema of lung    Falls    GERD (gastroesophageal reflux disease)    HTN hypertension    Meningitis    Poor historian    Presence of Watchman left atrial appendage closure device.    PAST SURGICAL HX  Presence of Watchman left atrial appendage closure device    S/P BKA (below knee amputation) unilateral, left    Unilateral amputation of lower extremity below knee.      FAMILY HX  Family history unknown: Adopted      SOCIAL HX  lives alone  former smoker  alcohol use last 2-3 days ago (19 Apr 2020 03:19)       PAST MEDICAL & SURGICAL HISTORY:  Chronic CHF  COPD without exacerbation  Atrial fibrillation  Presence of Watchman left atrial appendage closure device  Hypertension  GERD (gastroesophageal reflux disease)  Chronic obstructive pulmonary disease (COPD)  Anemia  Falls  Meningitis  Collapsed lung  Alcohol withdrawal  Emphysema of lung  Cirrhosis  CHF (congestive heart failure)  Poor historian  Alcohol abuse  Chronic atrial fibrillation  Unilateral amputation of lower extremity below knee  Presence of Watchman left atrial appendage closure device  S/P BKA (below knee amputation) unilateral, left      FAMILY HISTORY:  No pertinent family history in first degree relatives  Family history unknown: Adopted      Social Hx:    Allergies    Ceclor (Rash)  Duricef (Rash)    Intolerances            ICU Vital Signs Last 24 Hrs  T(C): 37.1 (01 Jul 2020 06:00), Max: 37.7 (30 Jun 2020 22:00)  T(F): 98.8 (01 Jul 2020 06:00), Max: 99.9 (30 Jun 2020 22:00)  HR: 70 (01 Jul 2020 09:00) (70 - 119)  BP: 87/59 (01 Jul 2020 09:00) (11/62 - 132/72)  BP(mean): 64 (01 Jul 2020 09:00) (60 - 88)  ABP: --  ABP(mean): --  RR: 30 (01 Jul 2020 09:00) (17 - 34)  SpO2: 95% (01 Jul 2020 09:00) (90% - 99%)          I&O's Summary    30 Jun 2020 07:01  -  01 Jul 2020 07:00  --------------------------------------------------------  IN: 85 mL / OUT: 1000 mL / NET: -915 mL                              10.2   6.57  )-----------( 203      ( 30 Jun 2020 07:25 )             31.7       06-30    141  |  106  |  16  ----------------------------<  86  4.0   |  30  |  0.58    Ca    9.0      30 Jun 2020 07:25  Phos  4.6     06-30  Mg     2.0     06-30                      MEDICATIONS  (STANDING):  ALBUTerol    90 MICROgram(s) HFA Inhaler 2 Puff(s) Inhalation every 4 hours  aspirin  chewable 81 milliGRAM(s) Oral daily  budesonide 160 MICROgram(s)/formoterol 4.5 MICROgram(s) Inhaler 2 Puff(s) Inhalation two times a day  clonazePAM  Tablet 0.5 milliGRAM(s) Oral two times a day  clopidogrel Tablet 75 milliGRAM(s) Oral daily  diltiazem    Tablet 60 milliGRAM(s) Oral every 6 hours  doxazosin 2 milliGRAM(s) Oral at bedtime  enoxaparin Injectable 40 milliGRAM(s) SubCutaneous every 12 hours  famotidine    Tablet 20 milliGRAM(s) Oral two times a day  gabapentin   Solution 400 milliGRAM(s) Enteral Tube three times a day  melatonin 5 milliGRAM(s) Oral at bedtime  metoprolol tartrate 25 milliGRAM(s) Oral two times a day  polyethylene glycol 3350 17 Gram(s) Oral daily  QUEtiapine 50 milliGRAM(s) Oral <User Schedule>  QUEtiapine 50 milliGRAM(s) Oral <User Schedule>  QUEtiapine 200 milliGRAM(s) Oral at bedtime  senna 2 Tablet(s) Oral at bedtime  thiamine 100 milliGRAM(s) Oral daily  tiotropium 18 MICROgram(s) Capsule 1 Capsule(s) Inhalation daily    MEDICATIONS  (PRN):  acetaminophen   Tablet .. 650 milliGRAM(s) Oral every 6 hours PRN Temp greater or equal to 38.5C (101.3F)  ibuprofen  Tablet. 400 milliGRAM(s) Oral every 6 hours PRN Moderate Pain (4 - 6)      DVT Prophylaxis:    Advanced Directives:  Discussed with:    Visit Information:    ** Time is exclusive of billed procedures and/or teaching and/or routine family updates.

## 2020-07-01 NOTE — PROGRESS NOTE ADULT - ASSESSMENT
PROBLEMS;    Febrile illness- GNR in sputum-pseudomonas/proteus-off abx  s/p staph bactermia/sputum pseudomonas/staph  Ac on chronic hypercapnic & hypoxamic respiratory failure-s/p trach & PEG  sputum-Few Pseudomonas aeruginosa (Carbapenem Resistant)/Moderate Methicillin resistant Staphylococcus aureus  afib with RVR  OHS/VIJAY  AC flare of COPD/chronic vs acute on chronic bronchitis  Mild interstitial lung disease-NSIP h/o smoking  COVID negativex2  Pulmonary hypertension  Nonobstructing stone in the left ureterovesicular junction/ nonobstructing stone in the lower pole right kidney.  Subacute hemorrhage in the right rectus abdominis along the anterior sheath, measures 1.6 cm in thickness and extends from the umbilicus to the inferior myotendinous junction  Cirrhosis  Mild splenomegaly-portal venous hypertension.  Chronic afib w Watchman device  CHF  BPH  GERD  ETOH abuse  seizures disorder    PLAN;    pulmonary stable  speach & swallow eval  Trach on RA  trach suction  Tube feeding as tolerated  supportive care  covid repeat testing-neg  Eliquis/asa 81  d/w staff

## 2020-07-01 NOTE — CHART NOTE - NSCHARTNOTEFT_GEN_A_CORE
*Nutrition reconsulted for tube feeding recommendations. Pt started on PO diet 6/29 dysphagia 1 w/ honey thick liquids. As per Nursing documentation in flow sheet pt was consumed chocolate icecream, pork, sweet potatoes, and carrots and was found to have brown/orange secretions coming from trach. Pt was then made NPO. Pt currently w/ continuous TF ordered Vital 1.5 @ 65 mL/hr. Pt noted to be pulling at tubes and lines and gastric content and TF formula spilling onto bed.     *Recommend change TF order to bolus feeds of Jevity 1.5 @ 400 mL  4x/day (TV daily = 1600 mL) w/ 1 packet of prosource TF daily. This TF order will provide 2440 kcal, 113 g protein, and 1216 mL free H20. Recommend free H20 flush 150 mL before/after each bolus. TF + free H20 flush will provide 2416 mL free H20.     Estimated Needs:   [ ] no change since previous assessment    2250-2700Kcal (25-30Kcal/Kg of adjust BW; 90Kg)  108-126g protein (1.2-1.4g/Kg adjusted BW: 90kg)  2250-2700mL (25-30mL/Kg of adjusted BW: 90Kg)

## 2020-07-02 PROCEDURE — 99232 SBSQ HOSP IP/OBS MODERATE 35: CPT

## 2020-07-02 RX ADMIN — Medication 60 MILLIGRAM(S): at 05:47

## 2020-07-02 RX ADMIN — Medication 81 MILLIGRAM(S): at 11:07

## 2020-07-02 RX ADMIN — Medication 0.5 MILLIGRAM(S): at 11:05

## 2020-07-02 RX ADMIN — Medication 0.5 MILLIGRAM(S): at 22:08

## 2020-07-02 RX ADMIN — SENNA PLUS 2 TABLET(S): 8.6 TABLET ORAL at 22:07

## 2020-07-02 RX ADMIN — GABAPENTIN 400 MILLIGRAM(S): 400 CAPSULE ORAL at 05:47

## 2020-07-02 RX ADMIN — CLOPIDOGREL BISULFATE 75 MILLIGRAM(S): 75 TABLET, FILM COATED ORAL at 11:06

## 2020-07-02 RX ADMIN — Medication 5 MILLIGRAM(S): at 22:08

## 2020-07-02 RX ADMIN — ENOXAPARIN SODIUM 40 MILLIGRAM(S): 100 INJECTION SUBCUTANEOUS at 22:09

## 2020-07-02 RX ADMIN — GABAPENTIN 400 MILLIGRAM(S): 400 CAPSULE ORAL at 22:08

## 2020-07-02 RX ADMIN — FAMOTIDINE 20 MILLIGRAM(S): 10 INJECTION INTRAVENOUS at 22:07

## 2020-07-02 RX ADMIN — QUETIAPINE FUMARATE 50 MILLIGRAM(S): 200 TABLET, FILM COATED ORAL at 16:55

## 2020-07-02 RX ADMIN — Medication 25 MILLIGRAM(S): at 11:07

## 2020-07-02 RX ADMIN — QUETIAPINE FUMARATE 50 MILLIGRAM(S): 200 TABLET, FILM COATED ORAL at 08:07

## 2020-07-02 RX ADMIN — FAMOTIDINE 20 MILLIGRAM(S): 10 INJECTION INTRAVENOUS at 11:07

## 2020-07-02 RX ADMIN — Medication 100 MILLIGRAM(S): at 11:06

## 2020-07-02 RX ADMIN — Medication 60 MILLIGRAM(S): at 11:06

## 2020-07-02 RX ADMIN — QUETIAPINE FUMARATE 200 MILLIGRAM(S): 200 TABLET, FILM COATED ORAL at 22:08

## 2020-07-02 RX ADMIN — Medication 400 MILLIGRAM(S): at 16:56

## 2020-07-02 RX ADMIN — Medication 60 MILLIGRAM(S): at 17:14

## 2020-07-02 RX ADMIN — ENOXAPARIN SODIUM 40 MILLIGRAM(S): 100 INJECTION SUBCUTANEOUS at 11:06

## 2020-07-02 RX ADMIN — GABAPENTIN 400 MILLIGRAM(S): 400 CAPSULE ORAL at 11:14

## 2020-07-02 RX ADMIN — Medication 2 MILLIGRAM(S): at 22:08

## 2020-07-02 RX ADMIN — POLYETHYLENE GLYCOL 3350 17 GRAM(S): 17 POWDER, FOR SOLUTION ORAL at 11:06

## 2020-07-02 RX ADMIN — Medication 25 MILLIGRAM(S): at 22:08

## 2020-07-02 NOTE — PROGRESS NOTE ADULT - SUBJECTIVE AND OBJECTIVE BOX
HPI: Patient seen at a  this morning for AMS and hypoxic  Patient had been admitted with a COPD exacerbation.  It was believed that he went into ETOH withdrawal and had a total of 6MG Ativan over night.  He required intubation 4/23.    4/24: he remains intubated.  Will try to wean this afternoon.      4/25: He weaned this morning and failed with a decreased Tv.  But close to extubation.    4/26: Patient weaned well this morning and was extubated.      5/2: Chart reviewed, Patient was reintubated, failed weaning this morning, CXR with improving infiltrates  5/3: Patient remains on the vent failed weaning x3 today with increased RR and low TV, Off Levophed now  5/4: Failed weaning yesterday, had to change his ET tube yesterday because he chewed through it,     5/5: Pt on Precedex and Diprivan for sedation, Patient failed weaning again today  5/6: Patient failed weaning today with a low TV and High RR  5/7: He again failed weaning, NGT replaced, Fi O2 back to 40%, WBC normal,     5/8: For weaning today, he has failed weaning daily  5/9: Weaning this morning off levo  5/10: He Failed weaning this morning    5/14: Patient failed weaning again this morning.  For Trach and PEG in 5/15    5/17: S/P Trach and PEG.  Weaned for several hours yesterday  5/18: Weaned for under an hour yesterday but desated.  Temps to 104 x 2 over night.    5/19: No Weaning yesterday because of the ongoing temps.  Now with MRSA in the Blood.    5/20: Temps down, now off precedex  5/21: Failed weaning yesterday, BC remain positive      6/29: Chart reviewed.  Agitation noted over night.  Patient now on TC.  He is awake this morning.  No temps WBC normal   6/30: Patient has been off restraints since yesterday, he is now able to take PO, no temps  7/1: Patient still confused at times, continues to be off restraints, there was of a question of aspiration of his dysphagia diet yesterday, no temps  7/2: Confusion improving, no events over night      PAST MEDICAL HX  Alcohol abuse    Alcohol withdrawal    Anemia    AFIB atrial fibrillation    CHF (congestive heart failure)    Chronic atrial fibrillation    Chronic CHF    Chronic obstructive pulmonary disease (COPD)    Cirrhosis    Collapsed lung    COPD without exacerbation    Emphysema of lung    Falls    GERD (gastroesophageal reflux disease)    HTN hypertension    Meningitis    Poor historian    Presence of Watchman left atrial appendage closure device.    PAST SURGICAL HX  Presence of Watchman left atrial appendage closure device    S/P BKA (below knee amputation) unilateral, left    Unilateral amputation of lower extremity below knee.      FAMILY HX  Family history unknown: Adopted      SOCIAL HX  lives alone  former smoker  alcohol use last 2-3 days ago (19 Apr 2020 03:19)       PAST MEDICAL & SURGICAL HISTORY:  Chronic CHF  COPD without exacerbation  Atrial fibrillation  Presence of Watchman left atrial appendage closure device  Hypertension  GERD (gastroesophageal reflux disease)  Chronic obstructive pulmonary disease (COPD)  Anemia  Falls  Meningitis  Collapsed lung  Alcohol withdrawal  Emphysema of lung  Cirrhosis  CHF (congestive heart failure)  Poor historian  Alcohol abuse  Chronic atrial fibrillation  Unilateral amputation of lower extremity below knee  Presence of Watchman left atrial appendage closure device  S/P BKA (below knee amputation) unilateral, left      FAMILY HISTORY:  No pertinent family history in first degree relatives  Family history unknown: Adopted      Social Hx:    Allergies    Ceclor (Rash)  Duricef (Rash)    Intolerances            ICU Vital Signs Last 24 Hrs  T(C): 36.9 (02 Jul 2020 08:00), Max: 37.2 (02 Jul 2020 04:00)  T(F): 98.4 (02 Jul 2020 08:00), Max: 98.9 (02 Jul 2020 04:00)  HR: 98 (02 Jul 2020 08:00) (66 - 98)  BP: 116/79 (02 Jul 2020 08:00) (91/57 - 126/57)  BP(mean): 68 (02 Jul 2020 07:00) (61 - 85)  ABP: --  ABP(mean): --  RR: 29 (02 Jul 2020 08:00) (19 - 33)  SpO2: 97% (02 Jul 2020 07:00) (90% - 99%)          I&O's Summary    01 Jul 2020 07:01  -  02 Jul 2020 07:00  --------------------------------------------------------  IN: 1400 mL / OUT: 1050 mL / NET: 350 mL                                  MEDICATIONS  (STANDING):  ALBUTerol    90 MICROgram(s) HFA Inhaler 2 Puff(s) Inhalation every 4 hours  aspirin  chewable 81 milliGRAM(s) Oral daily  budesonide 160 MICROgram(s)/formoterol 4.5 MICROgram(s) Inhaler 2 Puff(s) Inhalation two times a day  clonazePAM  Tablet 0.5 milliGRAM(s) Oral two times a day  clopidogrel Tablet 75 milliGRAM(s) Oral daily  diltiazem    Tablet 60 milliGRAM(s) Oral every 6 hours  doxazosin 2 milliGRAM(s) Oral at bedtime  enoxaparin Injectable 40 milliGRAM(s) SubCutaneous every 12 hours  famotidine    Tablet 20 milliGRAM(s) Oral two times a day  gabapentin   Solution 400 milliGRAM(s) Enteral Tube three times a day  melatonin 5 milliGRAM(s) Oral at bedtime  metoprolol tartrate 25 milliGRAM(s) Oral two times a day  polyethylene glycol 3350 17 Gram(s) Oral daily  QUEtiapine 50 milliGRAM(s) Oral <User Schedule>  QUEtiapine 50 milliGRAM(s) Oral <User Schedule>  QUEtiapine 200 milliGRAM(s) Oral at bedtime  senna 2 Tablet(s) Oral at bedtime  thiamine 100 milliGRAM(s) Oral daily  tiotropium 18 MICROgram(s) Capsule 1 Capsule(s) Inhalation daily    MEDICATIONS  (PRN):  acetaminophen   Tablet .. 650 milliGRAM(s) Oral every 6 hours PRN Temp greater or equal to 38.5C (101.3F)  ibuprofen  Tablet. 400 milliGRAM(s) Oral every 6 hours PRN Moderate Pain (4 - 6)      DVT Prophylaxis:    Advanced Directives:  Discussed with:    Visit Information:    ** Time is exclusive of billed procedures and/or teaching and/or routine family updates.

## 2020-07-02 NOTE — PROGRESS NOTE ADULT - SUBJECTIVE AND OBJECTIVE BOX
Subjective:  Pt seen, awake and alert.    Vital Signs:  Vital Signs Last 24 Hrs  T(C): 36.9 (07-02-20 @ 08:00), Max: 37.2 (07-02-20 @ 04:00)  T(F): 98.4 (07-02-20 @ 08:00), Max: 98.9 (07-02-20 @ 04:00)  HR: 94 (07-02-20 @ 10:00) (66 - 108)  BP: 124/64 (07-02-20 @ 10:00) (91/57 - 126/57)  RR: 31 (07-02-20 @ 10:00) (19 - 33)  SpO2: 75% (07-02-20 @ 10:00) (75% - 99%) on (O2)    Telemetry/Alarms:    Relevant labs, radiology and Medications reviewed            MEDICATIONS  (STANDING):  ALBUTerol    90 MICROgram(s) HFA Inhaler 2 Puff(s) Inhalation every 4 hours  aspirin  chewable 81 milliGRAM(s) Oral daily  budesonide 160 MICROgram(s)/formoterol 4.5 MICROgram(s) Inhaler 2 Puff(s) Inhalation two times a day  clonazePAM  Tablet 0.5 milliGRAM(s) Oral two times a day  clopidogrel Tablet 75 milliGRAM(s) Oral daily  diltiazem    Tablet 60 milliGRAM(s) Oral every 6 hours  doxazosin 2 milliGRAM(s) Oral at bedtime  enoxaparin Injectable 40 milliGRAM(s) SubCutaneous every 12 hours  famotidine    Tablet 20 milliGRAM(s) Oral two times a day  gabapentin   Solution 400 milliGRAM(s) Enteral Tube three times a day  melatonin 5 milliGRAM(s) Oral at bedtime  metoprolol tartrate 25 milliGRAM(s) Oral two times a day  polyethylene glycol 3350 17 Gram(s) Oral daily  QUEtiapine 50 milliGRAM(s) Oral <User Schedule>  QUEtiapine 50 milliGRAM(s) Oral <User Schedule>  QUEtiapine 200 milliGRAM(s) Oral at bedtime  senna 2 Tablet(s) Oral at bedtime  thiamine 100 milliGRAM(s) Oral daily  tiotropium 18 MICROgram(s) Capsule 1 Capsule(s) Inhalation daily    MEDICATIONS  (PRN):  acetaminophen   Tablet .. 650 milliGRAM(s) Oral every 6 hours PRN Temp greater or equal to 38.5C (101.3F)  ibuprofen  Tablet. 400 milliGRAM(s) Oral every 6 hours PRN Moderate Pain (4 - 6)      Physical exam  Gen NAD  Neuro alert  neck- capped trach midline  Card RRR  Pulm clear  Abd soft, PEG  Ext warm        I&O's Summary    01 Jul 2020 07:01  -  02 Jul 2020 07:00  --------------------------------------------------------  IN: 1400 mL / OUT: 1050 mL / NET: 350 mL        Assessment  65y Male  w/ PAST MEDICAL & SURGICAL HISTORY:  Chronic CHF  COPD without exacerbation  Atrial fibrillation  Presence of Watchman left atrial appendage closure device  Hypertension  GERD (gastroesophageal reflux disease)  Chronic obstructive pulmonary disease (COPD)  Anemia  Falls  Meningitis  Collapsed lung  Alcohol withdrawal  Emphysema of lung  Cirrhosis  CHF (congestive heart failure)  Poor historian  Alcohol abuse  Chronic atrial fibrillation  Unilateral amputation of lower extremity below knee  Presence of Watchman left atrial appendage closure device  S/P BKA (below knee amputation) unilateral, left  admitted with complaints of Patient is a 65y old  Male who presents with a chief complaint of acute hypoxemic, hypercapneic resp failure  COPD exacerbation (02 Jul 2020 10:00)  .  65y old  Male with PMH CHF, COPD, and acute resp failure requiring trach in 2015 and 2017, presents with a chief complaint of acute hypoxemic, hypercapnic resp failure, and COPD exacerbation complicated by PNA. S/P trach and PEG 5/15. 6/15 Sputum cultures with proteus and pseudomonas. Pt w prolonged hospital course.    trach capped  No decannulation this hospital admission.  May follow up as outpatient with Dr. Morris for trach removal.  Discharge planning.    Discussed with Cardiothoracic Team at AM rounds.

## 2020-07-02 NOTE — PROGRESS NOTE ADULT - SUBJECTIVE AND OBJECTIVE BOX
Subjective:    pat trach on RA, no new events, no new events.    Home Medications:  acetaminophen 325 mg oral tablet: 2 tab(s) orally every 6 hours, As needed, Temp greater or equal to 38.5C (101.3F) (02 Jun 2020 12:53)  albuterol 90 mcg/inh inhalation aerosol: 2 puff(s) inhaled every 4 hours (02 Jun 2020 12:53)  ALPRAZolam 0.5 mg oral tablet: 1 tab(s) orally every 6 hours (02 Jun 2020 12:53)  aspirin 81 mg oral tablet, chewable: 1 tab(s) orally once a day (02 Jun 2020 12:53)  clopidogrel 75 mg oral tablet: 1 tab(s) orally once a day (02 Jun 2020 12:53)  dilTIAZem 90 mg oral tablet: 1 tab(s) orally every 6 hours (02 Jun 2020 12:53)  doxazosin 2 mg oral tablet: 1 tab(s) orally once a day (at bedtime) (02 Jun 2020 12:53)  gabapentin 400 mg oral capsule: 1 cap(s) orally 3 times a day (02 Jun 2020 12:53)  pantoprazole 40 mg oral granule, enteric coated: 40 milligram(s) orally once a day (19 Apr 2020 03:12)  QUEtiapine 100 mg oral tablet: 1 tab(s) orally once a day (at bedtime) (02 Jun 2020 12:53)  QUEtiapine 50 mg oral tablet: 1 tab(s) orally once a day (02 Jun 2020 12:53)  Spiriva 18 mcg inhalation capsule: 1 cap(s) inhaled once a day (19 Apr 2020 03:12)  thiamine 100 mg oral tablet: 1 tab(s) orally once a day (02 Jun 2020 12:53)    MEDICATIONS  (STANDING):  ALBUTerol    90 MICROgram(s) HFA Inhaler 2 Puff(s) Inhalation every 4 hours  aspirin  chewable 81 milliGRAM(s) Oral daily  budesonide 160 MICROgram(s)/formoterol 4.5 MICROgram(s) Inhaler 2 Puff(s) Inhalation two times a day  clonazePAM  Tablet 0.5 milliGRAM(s) Oral two times a day  clopidogrel Tablet 75 milliGRAM(s) Oral daily  diltiazem    Tablet 60 milliGRAM(s) Oral every 6 hours  doxazosin 2 milliGRAM(s) Oral at bedtime  enoxaparin Injectable 40 milliGRAM(s) SubCutaneous every 12 hours  famotidine    Tablet 20 milliGRAM(s) Oral two times a day  gabapentin   Solution 400 milliGRAM(s) Enteral Tube three times a day  melatonin 5 milliGRAM(s) Oral at bedtime  metoprolol tartrate 25 milliGRAM(s) Oral two times a day  polyethylene glycol 3350 17 Gram(s) Oral daily  QUEtiapine 50 milliGRAM(s) Oral <User Schedule>  QUEtiapine 50 milliGRAM(s) Oral <User Schedule>  QUEtiapine 200 milliGRAM(s) Oral at bedtime  senna 2 Tablet(s) Oral at bedtime  thiamine 100 milliGRAM(s) Oral daily  tiotropium 18 MICROgram(s) Capsule 1 Capsule(s) Inhalation daily    MEDICATIONS  (PRN):  acetaminophen   Tablet .. 650 milliGRAM(s) Oral every 6 hours PRN Temp greater or equal to 38.5C (101.3F)  ibuprofen  Tablet. 400 milliGRAM(s) Oral every 6 hours PRN Moderate Pain (4 - 6)      Allergies    Ceclor (Rash)  Duricef (Rash)    Intolerances        Vital Signs Last 24 Hrs  T(C): 36.9 (02 Jul 2020 08:00), Max: 37.2 (02 Jul 2020 04:00)  T(F): 98.4 (02 Jul 2020 08:00), Max: 98.9 (02 Jul 2020 04:00)  HR: 94 (02 Jul 2020 10:00) (66 - 108)  BP: 124/64 (02 Jul 2020 10:00) (91/57 - 124/64)  BP(mean): 78 (02 Jul 2020 10:00) (61 - 85)  RR: 31 (02 Jul 2020 10:00) (19 - 33)  SpO2: 75% (02 Jul 2020 10:00) (75% - 99%)      PHYSICAL EXAMINATION:    NECK:  Supple. No lymphadenopathy. Jugular venous pressure not elevated. Carotids equal.   HEART:   The cardiac impulse has a normal quality. Reg., Nl S1 and S2.  There are no murmurs, rubs or gallops noted  CHEST:  Chest crackles to auscultation. Normal respiratory effort.  ABDOMEN:  Soft and nontender.   EXTREMITIES:  There is no edema.       LABS:

## 2020-07-03 LAB
ANION GAP SERPL CALC-SCNC: 6 MMOL/L — SIGNIFICANT CHANGE UP (ref 5–17)
BUN SERPL-MCNC: 16 MG/DL — SIGNIFICANT CHANGE UP (ref 7–23)
CALCIUM SERPL-MCNC: 9 MG/DL — SIGNIFICANT CHANGE UP (ref 8.5–10.1)
CHLORIDE SERPL-SCNC: 105 MMOL/L — SIGNIFICANT CHANGE UP (ref 96–108)
CO2 SERPL-SCNC: 29 MMOL/L — SIGNIFICANT CHANGE UP (ref 22–31)
CREAT SERPL-MCNC: 0.63 MG/DL — SIGNIFICANT CHANGE UP (ref 0.5–1.3)
GLUCOSE SERPL-MCNC: 93 MG/DL — SIGNIFICANT CHANGE UP (ref 70–99)
HCT VFR BLD CALC: 33.6 % — LOW (ref 39–50)
HGB BLD-MCNC: 10.6 G/DL — LOW (ref 13–17)
MAGNESIUM SERPL-MCNC: 2.1 MG/DL — SIGNIFICANT CHANGE UP (ref 1.6–2.6)
MCHC RBC-ENTMCNC: 30.3 PG — SIGNIFICANT CHANGE UP (ref 27–34)
MCHC RBC-ENTMCNC: 31.5 GM/DL — LOW (ref 32–36)
MCV RBC AUTO: 96 FL — SIGNIFICANT CHANGE UP (ref 80–100)
PHOSPHATE SERPL-MCNC: 4.6 MG/DL — HIGH (ref 2.5–4.5)
PLATELET # BLD AUTO: 223 K/UL — SIGNIFICANT CHANGE UP (ref 150–400)
POTASSIUM SERPL-MCNC: 3.9 MMOL/L — SIGNIFICANT CHANGE UP (ref 3.5–5.3)
POTASSIUM SERPL-SCNC: 3.9 MMOL/L — SIGNIFICANT CHANGE UP (ref 3.5–5.3)
RBC # BLD: 3.5 M/UL — LOW (ref 4.2–5.8)
RBC # FLD: 14.8 % — HIGH (ref 10.3–14.5)
SODIUM SERPL-SCNC: 140 MMOL/L — SIGNIFICANT CHANGE UP (ref 135–145)
WBC # BLD: 5.26 K/UL — SIGNIFICANT CHANGE UP (ref 3.8–10.5)
WBC # FLD AUTO: 5.26 K/UL — SIGNIFICANT CHANGE UP (ref 3.8–10.5)

## 2020-07-03 PROCEDURE — 99232 SBSQ HOSP IP/OBS MODERATE 35: CPT

## 2020-07-03 RX ORDER — QUETIAPINE FUMARATE 200 MG/1
100 TABLET, FILM COATED ORAL
Refills: 0 | Status: DISCONTINUED | OUTPATIENT
Start: 2020-07-03 | End: 2020-07-08

## 2020-07-03 RX ADMIN — SENNA PLUS 2 TABLET(S): 8.6 TABLET ORAL at 22:29

## 2020-07-03 RX ADMIN — BUDESONIDE AND FORMOTEROL FUMARATE DIHYDRATE 2 PUFF(S): 160; 4.5 AEROSOL RESPIRATORY (INHALATION) at 08:00

## 2020-07-03 RX ADMIN — ALBUTEROL 2 PUFF(S): 90 AEROSOL, METERED ORAL at 16:00

## 2020-07-03 RX ADMIN — QUETIAPINE FUMARATE 200 MILLIGRAM(S): 200 TABLET, FILM COATED ORAL at 22:29

## 2020-07-03 RX ADMIN — Medication 25 MILLIGRAM(S): at 22:29

## 2020-07-03 RX ADMIN — Medication 60 MILLIGRAM(S): at 00:15

## 2020-07-03 RX ADMIN — Medication 5 MILLIGRAM(S): at 22:29

## 2020-07-03 RX ADMIN — QUETIAPINE FUMARATE 100 MILLIGRAM(S): 200 TABLET, FILM COATED ORAL at 16:52

## 2020-07-03 RX ADMIN — Medication 60 MILLIGRAM(S): at 23:05

## 2020-07-03 RX ADMIN — ALBUTEROL 2 PUFF(S): 90 AEROSOL, METERED ORAL at 12:17

## 2020-07-03 RX ADMIN — FAMOTIDINE 20 MILLIGRAM(S): 10 INJECTION INTRAVENOUS at 22:32

## 2020-07-03 RX ADMIN — Medication 81 MILLIGRAM(S): at 09:14

## 2020-07-03 RX ADMIN — ENOXAPARIN SODIUM 40 MILLIGRAM(S): 100 INJECTION SUBCUTANEOUS at 22:30

## 2020-07-03 RX ADMIN — Medication 60 MILLIGRAM(S): at 17:08

## 2020-07-03 RX ADMIN — TIOTROPIUM BROMIDE 1 CAPSULE(S): 18 CAPSULE ORAL; RESPIRATORY (INHALATION) at 08:00

## 2020-07-03 RX ADMIN — Medication 0.5 MILLIGRAM(S): at 22:29

## 2020-07-03 RX ADMIN — Medication 400 MILLIGRAM(S): at 13:07

## 2020-07-03 RX ADMIN — FAMOTIDINE 20 MILLIGRAM(S): 10 INJECTION INTRAVENOUS at 09:14

## 2020-07-03 RX ADMIN — GABAPENTIN 400 MILLIGRAM(S): 400 CAPSULE ORAL at 22:29

## 2020-07-03 RX ADMIN — Medication 0.5 MILLIGRAM(S): at 09:14

## 2020-07-03 RX ADMIN — Medication 60 MILLIGRAM(S): at 05:29

## 2020-07-03 RX ADMIN — QUETIAPINE FUMARATE 100 MILLIGRAM(S): 200 TABLET, FILM COATED ORAL at 09:13

## 2020-07-03 RX ADMIN — Medication 2 MILLIGRAM(S): at 22:29

## 2020-07-03 RX ADMIN — Medication 400 MILLIGRAM(S): at 22:34

## 2020-07-03 RX ADMIN — ALBUTEROL 2 PUFF(S): 90 AEROSOL, METERED ORAL at 08:02

## 2020-07-03 RX ADMIN — Medication 25 MILLIGRAM(S): at 09:15

## 2020-07-03 RX ADMIN — POLYETHYLENE GLYCOL 3350 17 GRAM(S): 17 POWDER, FOR SOLUTION ORAL at 09:14

## 2020-07-03 RX ADMIN — Medication 100 MILLIGRAM(S): at 09:14

## 2020-07-03 RX ADMIN — CLOPIDOGREL BISULFATE 75 MILLIGRAM(S): 75 TABLET, FILM COATED ORAL at 09:14

## 2020-07-03 RX ADMIN — GABAPENTIN 400 MILLIGRAM(S): 400 CAPSULE ORAL at 05:29

## 2020-07-03 RX ADMIN — GABAPENTIN 400 MILLIGRAM(S): 400 CAPSULE ORAL at 13:03

## 2020-07-03 RX ADMIN — ENOXAPARIN SODIUM 40 MILLIGRAM(S): 100 INJECTION SUBCUTANEOUS at 09:14

## 2020-07-03 NOTE — PROGRESS NOTE ADULT - SUBJECTIVE AND OBJECTIVE BOX
HPI: Patient seen at a  this morning for AMS and hypoxic  Patient had been admitted with a COPD exacerbation.  It was believed that he went into ETOH withdrawal and had a total of 6MG Ativan over night.  He required intubation 4/23.    4/24: he remains intubated.  Will try to wean this afternoon.      4/25: He weaned this morning and failed with a decreased Tv.  But close to extubation.    4/26: Patient weaned well this morning and was extubated.      5/2: Chart reviewed, Patient was reintubated, failed weaning this morning, CXR with improving infiltrates  5/3: Patient remains on the vent failed weaning x3 today with increased RR and low TV, Off Levophed now  5/4: Failed weaning yesterday, had to change his ET tube yesterday because he chewed through it,     5/5: Pt on Precedex and Diprivan for sedation, Patient failed weaning again today  5/6: Patient failed weaning today with a low TV and High RR  5/7: He again failed weaning, NGT replaced, Fi O2 back to 40%, WBC normal,     5/8: For weaning today, he has failed weaning daily  5/9: Weaning this morning off levo  5/10: He Failed weaning this morning    5/14: Patient failed weaning again this morning.  For Trach and PEG in 5/15    5/17: S/P Trach and PEG.  Weaned for several hours yesterday  5/18: Weaned for under an hour yesterday but desated.  Temps to 104 x 2 over night.    5/19: No Weaning yesterday because of the ongoing temps.  Now with MRSA in the Blood.    5/20: Temps down, now off precedex  5/21: Failed weaning yesterday, BC remain positive      6/29: Chart reviewed.  Agitation noted over night.  Patient now on TC.  He is awake this morning.  No temps WBC normal   6/30: Patient has been off restraints since yesterday, he is now able to take PO, no temps  7/1: Patient still confused at times, continues to be off restraints, there was of a question of aspiration of his dysphagia diet yesterday, no temps  7/2: Confusion improving, no events over night  7/3: No events over night      PAST MEDICAL HX  Alcohol abuse    Alcohol withdrawal    Anemia    AFIB atrial fibrillation    CHF (congestive heart failure)    Chronic atrial fibrillation    Chronic CHF    Chronic obstructive pulmonary disease (COPD)    Cirrhosis    Collapsed lung    COPD without exacerbation    Emphysema of lung    Falls    GERD (gastroesophageal reflux disease)    HTN hypertension    Meningitis    Poor historian    Presence of Watchman left atrial appendage closure device.    PAST SURGICAL HX  Presence of Watchman left atrial appendage closure device    S/P BKA (below knee amputation) unilateral, left    Unilateral amputation of lower extremity below knee.      FAMILY HX  Family history unknown: Adopted      SOCIAL HX  lives alone  former smoker  alcohol use last 2-3 days ago (19 Apr 2020 03:19)       PAST MEDICAL & SURGICAL HISTORY:  Chronic CHF  COPD without exacerbation  Atrial fibrillation  Presence of Watchman left atrial appendage closure device  Hypertension  GERD (gastroesophageal reflux disease)  Chronic obstructive pulmonary disease (COPD)  Anemia  Falls  Meningitis  Collapsed lung  Alcohol withdrawal  Emphysema of lung  Cirrhosis  CHF (congestive heart failure)  Poor historian  Alcohol abuse  Chronic atrial fibrillation  Unilateral amputation of lower extremity below knee  Presence of Watchman left atrial appendage closure device  S/P BKA (below knee amputation) unilateral, left      FAMILY HISTORY:  No pertinent family history in first degree relatives  Family history unknown: Adopted      Social Hx:    Allergies    Ceclor (Rash)  Duricef (Rash)    Intolerances            ICU Vital Signs Last 24 Hrs  T(C): 36.7 (02 Jul 2020 23:00), Max: 37 (02 Jul 2020 18:00)  T(F): 98 (02 Jul 2020 23:00), Max: 98.6 (02 Jul 2020 18:00)  HR: 83 (03 Jul 2020 08:06) (72 - 102)  BP: 124/67 (03 Jul 2020 08:00) (98/70 - 130/60)  BP(mean): 80 (03 Jul 2020 08:00) (66 - 101)  ABP: --  ABP(mean): --  RR: 31 (03 Jul 2020 08:00) (19 - 31)  SpO2: 99% (03 Jul 2020 08:06) (82% - 100%)          I&O's Summary    02 Jul 2020 07:01  -  03 Jul 2020 07:00  --------------------------------------------------------  IN: 2800 mL / OUT: 1500 mL / NET: 1300 mL                              10.6   5.26  )-----------( 223      ( 03 Jul 2020 06:44 )             33.6       07-03    140  |  105  |  16  ----------------------------<  93  3.9   |  29  |  0.63    Ca    9.0      03 Jul 2020 06:44  Phos  4.6     07-03  Mg     2.1     07-03                      MEDICATIONS  (STANDING):  ALBUTerol    90 MICROgram(s) HFA Inhaler 2 Puff(s) Inhalation every 4 hours  aspirin  chewable 81 milliGRAM(s) Oral daily  budesonide 160 MICROgram(s)/formoterol 4.5 MICROgram(s) Inhaler 2 Puff(s) Inhalation two times a day  clonazePAM  Tablet 0.5 milliGRAM(s) Oral two times a day  clopidogrel Tablet 75 milliGRAM(s) Oral daily  diltiazem    Tablet 60 milliGRAM(s) Oral every 6 hours  doxazosin 2 milliGRAM(s) Oral at bedtime  enoxaparin Injectable 40 milliGRAM(s) SubCutaneous every 12 hours  famotidine    Tablet 20 milliGRAM(s) Oral two times a day  gabapentin   Solution 400 milliGRAM(s) Enteral Tube three times a day  melatonin 5 milliGRAM(s) Oral at bedtime  metoprolol tartrate 25 milliGRAM(s) Oral two times a day  polyethylene glycol 3350 17 Gram(s) Oral daily  QUEtiapine 200 milliGRAM(s) Oral at bedtime  QUEtiapine 100 milliGRAM(s) Oral <User Schedule>  senna 2 Tablet(s) Oral at bedtime  thiamine 100 milliGRAM(s) Oral daily  tiotropium 18 MICROgram(s) Capsule 1 Capsule(s) Inhalation daily    MEDICATIONS  (PRN):  acetaminophen   Tablet .. 650 milliGRAM(s) Oral every 6 hours PRN Temp greater or equal to 38.5C (101.3F)  ibuprofen  Tablet. 400 milliGRAM(s) Oral every 6 hours PRN Moderate Pain (4 - 6)      DVT Prophylaxis:    Advanced Directives:  Discussed with:    Visit Information:    ** Time is exclusive of billed procedures and/or teaching and/or routine family updates.

## 2020-07-04 PROCEDURE — 99232 SBSQ HOSP IP/OBS MODERATE 35: CPT

## 2020-07-04 RX ADMIN — QUETIAPINE FUMARATE 100 MILLIGRAM(S): 200 TABLET, FILM COATED ORAL at 15:47

## 2020-07-04 RX ADMIN — Medication 25 MILLIGRAM(S): at 21:36

## 2020-07-04 RX ADMIN — QUETIAPINE FUMARATE 100 MILLIGRAM(S): 200 TABLET, FILM COATED ORAL at 09:03

## 2020-07-04 RX ADMIN — FAMOTIDINE 20 MILLIGRAM(S): 10 INJECTION INTRAVENOUS at 09:02

## 2020-07-04 RX ADMIN — Medication 2 MILLIGRAM(S): at 21:36

## 2020-07-04 RX ADMIN — CLOPIDOGREL BISULFATE 75 MILLIGRAM(S): 75 TABLET, FILM COATED ORAL at 09:03

## 2020-07-04 RX ADMIN — Medication 81 MILLIGRAM(S): at 09:02

## 2020-07-04 RX ADMIN — ENOXAPARIN SODIUM 40 MILLIGRAM(S): 100 INJECTION SUBCUTANEOUS at 21:36

## 2020-07-04 RX ADMIN — Medication 0.5 MILLIGRAM(S): at 21:35

## 2020-07-04 RX ADMIN — ENOXAPARIN SODIUM 40 MILLIGRAM(S): 100 INJECTION SUBCUTANEOUS at 11:29

## 2020-07-04 RX ADMIN — GABAPENTIN 400 MILLIGRAM(S): 400 CAPSULE ORAL at 15:47

## 2020-07-04 RX ADMIN — QUETIAPINE FUMARATE 200 MILLIGRAM(S): 200 TABLET, FILM COATED ORAL at 21:35

## 2020-07-04 RX ADMIN — Medication 5 MILLIGRAM(S): at 21:35

## 2020-07-04 RX ADMIN — Medication 60 MILLIGRAM(S): at 17:14

## 2020-07-04 RX ADMIN — FAMOTIDINE 20 MILLIGRAM(S): 10 INJECTION INTRAVENOUS at 21:36

## 2020-07-04 RX ADMIN — Medication 0.5 MILLIGRAM(S): at 09:02

## 2020-07-04 RX ADMIN — SENNA PLUS 2 TABLET(S): 8.6 TABLET ORAL at 21:36

## 2020-07-04 RX ADMIN — Medication 60 MILLIGRAM(S): at 23:26

## 2020-07-04 RX ADMIN — Medication 100 MILLIGRAM(S): at 09:02

## 2020-07-04 RX ADMIN — Medication 25 MILLIGRAM(S): at 09:03

## 2020-07-04 RX ADMIN — GABAPENTIN 400 MILLIGRAM(S): 400 CAPSULE ORAL at 05:35

## 2020-07-04 RX ADMIN — Medication 60 MILLIGRAM(S): at 05:35

## 2020-07-04 RX ADMIN — GABAPENTIN 400 MILLIGRAM(S): 400 CAPSULE ORAL at 21:36

## 2020-07-04 NOTE — PROGRESS NOTE ADULT - SUBJECTIVE AND OBJECTIVE BOX
Subjective:    pat better, trach capped, sat 100%, no respiratory distress, desaturation during sleep.    Home Medications:  acetaminophen 325 mg oral tablet: 2 tab(s) orally every 6 hours, As needed, Temp greater or equal to 38.5C (101.3F) (02 Jun 2020 12:53)  albuterol 90 mcg/inh inhalation aerosol: 2 puff(s) inhaled every 4 hours (02 Jun 2020 12:53)  ALPRAZolam 0.5 mg oral tablet: 1 tab(s) orally every 6 hours (02 Jun 2020 12:53)  aspirin 81 mg oral tablet, chewable: 1 tab(s) orally once a day (02 Jun 2020 12:53)  clopidogrel 75 mg oral tablet: 1 tab(s) orally once a day (02 Jun 2020 12:53)  dilTIAZem 90 mg oral tablet: 1 tab(s) orally every 6 hours (02 Jun 2020 12:53)  doxazosin 2 mg oral tablet: 1 tab(s) orally once a day (at bedtime) (02 Jun 2020 12:53)  gabapentin 400 mg oral capsule: 1 cap(s) orally 3 times a day (02 Jun 2020 12:53)  pantoprazole 40 mg oral granule, enteric coated: 40 milligram(s) orally once a day (19 Apr 2020 03:12)  QUEtiapine 100 mg oral tablet: 1 tab(s) orally once a day (at bedtime) (02 Jun 2020 12:53)  QUEtiapine 50 mg oral tablet: 1 tab(s) orally once a day (02 Jun 2020 12:53)  Spiriva 18 mcg inhalation capsule: 1 cap(s) inhaled once a day (19 Apr 2020 03:12)  thiamine 100 mg oral tablet: 1 tab(s) orally once a day (02 Jun 2020 12:53)    MEDICATIONS  (STANDING):  ALBUTerol    90 MICROgram(s) HFA Inhaler 2 Puff(s) Inhalation every 4 hours  aspirin  chewable 81 milliGRAM(s) Oral daily  budesonide 160 MICROgram(s)/formoterol 4.5 MICROgram(s) Inhaler 2 Puff(s) Inhalation two times a day  clonazePAM  Tablet 0.5 milliGRAM(s) Oral two times a day  clopidogrel Tablet 75 milliGRAM(s) Oral daily  diltiazem    Tablet 60 milliGRAM(s) Oral every 6 hours  doxazosin 2 milliGRAM(s) Oral at bedtime  enoxaparin Injectable 40 milliGRAM(s) SubCutaneous every 12 hours  famotidine    Tablet 20 milliGRAM(s) Oral two times a day  gabapentin   Solution 400 milliGRAM(s) Enteral Tube three times a day  melatonin 5 milliGRAM(s) Oral at bedtime  metoprolol tartrate 25 milliGRAM(s) Oral two times a day  polyethylene glycol 3350 17 Gram(s) Oral daily  QUEtiapine 200 milliGRAM(s) Oral at bedtime  QUEtiapine 100 milliGRAM(s) Oral <User Schedule>  senna 2 Tablet(s) Oral at bedtime  thiamine 100 milliGRAM(s) Oral daily  tiotropium 18 MICROgram(s) Capsule 1 Capsule(s) Inhalation daily    MEDICATIONS  (PRN):  acetaminophen   Tablet .. 650 milliGRAM(s) Oral every 6 hours PRN Temp greater or equal to 38.5C (101.3F)  ibuprofen  Tablet. 400 milliGRAM(s) Oral every 6 hours PRN Moderate Pain (4 - 6)      Allergies    Ceclor (Rash)  Duricef (Rash)    Intolerances        Vital Signs Last 24 Hrs  T(C): 36.6 (04 Jul 2020 06:00), Max: 37 (03 Jul 2020 15:00)  T(F): 97.8 (04 Jul 2020 06:00), Max: 98.6 (03 Jul 2020 15:00)  HR: 68 (04 Jul 2020 11:00) (67 - 94)  BP: 94/47 (04 Jul 2020 11:00) (94/47 - 117/68)  BP(mean): 56 (04 Jul 2020 11:00) (56 - 79)  RR: 26 (04 Jul 2020 11:00) (22 - 32)  SpO2: 100% (04 Jul 2020 11:00) (89% - 100%)      PHYSICAL EXAMINATION:    NECK:  Supple. No lymphadenopathy. Jugular venous pressure not elevated. Carotids equal.   HEART:   The cardiac impulse has a normal quality. Reg., Nl S1 and S2.  There are no murmurs, rubs or gallops noted  CHEST:  Chest is clear to auscultation. Normal respiratory effort.  ABDOMEN:  Soft and nontender.   EXTREMITIES:  There is no edema.       LABS:                        10.6   5.26  )-----------( 223      ( 03 Jul 2020 06:44 )             33.6     07-03    140  |  105  |  16  ----------------------------<  93  3.9   |  29  |  0.63    Ca    9.0      03 Jul 2020 06:44  Phos  4.6     07-03  Mg     2.1     07-03

## 2020-07-04 NOTE — PROGRESS NOTE ADULT - SUBJECTIVE AND OBJECTIVE BOX
HPI: Patient seen at a  this morning for AMS and hypoxic  Patient had been admitted with a COPD exacerbation.  It was believed that he went into ETOH withdrawal and had a total of 6MG Ativan over night.  He required intubation 4/23.    4/24: he remains intubated.  Will try to wean this afternoon.      4/25: He weaned this morning and failed with a decreased Tv.  But close to extubation.    4/26: Patient weaned well this morning and was extubated.      5/2: Chart reviewed, Patient was reintubated, failed weaning this morning, CXR with improving infiltrates  5/3: Patient remains on the vent failed weaning x3 today with increased RR and low TV, Off Levophed now  5/4: Failed weaning yesterday, had to change his ET tube yesterday because he chewed through it,     5/5: Pt on Precedex and Diprivan for sedation, Patient failed weaning again today  5/6: Patient failed weaning today with a low TV and High RR  5/7: He again failed weaning, NGT replaced, Fi O2 back to 40%, WBC normal,     5/8: For weaning today, he has failed weaning daily  5/9: Weaning this morning off levo  5/10: He Failed weaning this morning    5/14: Patient failed weaning again this morning.  For Trach and PEG in 5/15    5/17: S/P Trach and PEG.  Weaned for several hours yesterday  5/18: Weaned for under an hour yesterday but desated.  Temps to 104 x 2 over night.    5/19: No Weaning yesterday because of the ongoing temps.  Now with MRSA in the Blood.    5/20: Temps down, now off precedex  5/21: Failed weaning yesterday, BC remain positive      6/29: Chart reviewed.  Agitation noted over night.  Patient now on TC.  He is awake this morning.  No temps WBC normal   6/30: Patient has been off restraints since yesterday, he is now able to take PO, no temps  7/1: Patient still confused at times, continues to be off restraints, there was of a question of aspiration of his dysphagia diet yesterday, no temps  7/2: Confusion improving, no events over night  7/3: No events over night  7/4: No events over night.  Some periods of confusion        PAST MEDICAL HX  Alcohol abuse    Alcohol withdrawal    Anemia    AFIB atrial fibrillation    CHF (congestive heart failure)    Chronic atrial fibrillation    Chronic CHF    Chronic obstructive pulmonary disease (COPD)    Cirrhosis    Collapsed lung    COPD without exacerbation    Emphysema of lung    Falls    GERD (gastroesophageal reflux disease)    HTN hypertension    Meningitis    Poor historian    Presence of Watchman left atrial appendage closure device.    PAST SURGICAL HX  Presence of Watchman left atrial appendage closure device    S/P BKA (below knee amputation) unilateral, left    Unilateral amputation of lower extremity below knee.      FAMILY HX  Family history unknown: Adopted      SOCIAL HX  lives alone  former smoker  alcohol use last 2-3 days ago (19 Apr 2020 03:19)       PAST MEDICAL & SURGICAL HISTORY:  Chronic CHF  COPD without exacerbation  Atrial fibrillation  Presence of Watchman left atrial appendage closure device  Hypertension  GERD (gastroesophageal reflux disease)  Chronic obstructive pulmonary disease (COPD)  Anemia  Falls  Meningitis  Collapsed lung  Alcohol withdrawal  Emphysema of lung  Cirrhosis  CHF (congestive heart failure)  Poor historian  Alcohol abuse  Chronic atrial fibrillation  Unilateral amputation of lower extremity below knee  Presence of Watchman left atrial appendage closure device  S/P BKA (below knee amputation) unilateral, left      FAMILY HISTORY:  No pertinent family history in first degree relatives  Family history unknown: Adopted      Social Hx:    Allergies    Ceclor (Rash)  Duricef (Rash)    Intolerances            ICU Vital Signs Last 24 Hrs  T(C): 36.8 (03 Jul 2020 22:00), Max: 37 (03 Jul 2020 15:00)  T(F): 98.2 (03 Jul 2020 22:00), Max: 98.6 (03 Jul 2020 15:00)  HR: 85 (04 Jul 2020 06:00) (67 - 94)  BP: 110/58 (04 Jul 2020 06:00) (92/47 - 124/80)  BP(mean): 71 (04 Jul 2020 06:00) (57 - 91)  ABP: --  ABP(mean): --  RR: 26 (04 Jul 2020 06:00) (20 - 32)  SpO2: 89% (04 Jul 2020 06:00) (89% - 99%)          I&O's Summary    03 Jul 2020 07:01  -  04 Jul 2020 07:00  --------------------------------------------------------  IN: 1400 mL / OUT: 2300 mL / NET: -900 mL                              10.6   5.26  )-----------( 223      ( 03 Jul 2020 06:44 )             33.6       07-03    140  |  105  |  16  ----------------------------<  93  3.9   |  29  |  0.63    Ca    9.0      03 Jul 2020 06:44  Phos  4.6     07-03  Mg     2.1     07-03                      MEDICATIONS  (STANDING):  ALBUTerol    90 MICROgram(s) HFA Inhaler 2 Puff(s) Inhalation every 4 hours  aspirin  chewable 81 milliGRAM(s) Oral daily  budesonide 160 MICROgram(s)/formoterol 4.5 MICROgram(s) Inhaler 2 Puff(s) Inhalation two times a day  clonazePAM  Tablet 0.5 milliGRAM(s) Oral two times a day  clopidogrel Tablet 75 milliGRAM(s) Oral daily  diltiazem    Tablet 60 milliGRAM(s) Oral every 6 hours  doxazosin 2 milliGRAM(s) Oral at bedtime  enoxaparin Injectable 40 milliGRAM(s) SubCutaneous every 12 hours  famotidine    Tablet 20 milliGRAM(s) Oral two times a day  gabapentin   Solution 400 milliGRAM(s) Enteral Tube three times a day  melatonin 5 milliGRAM(s) Oral at bedtime  metoprolol tartrate 25 milliGRAM(s) Oral two times a day  polyethylene glycol 3350 17 Gram(s) Oral daily  QUEtiapine 200 milliGRAM(s) Oral at bedtime  QUEtiapine 100 milliGRAM(s) Oral <User Schedule>  senna 2 Tablet(s) Oral at bedtime  thiamine 100 milliGRAM(s) Oral daily  tiotropium 18 MICROgram(s) Capsule 1 Capsule(s) Inhalation daily    MEDICATIONS  (PRN):  acetaminophen   Tablet .. 650 milliGRAM(s) Oral every 6 hours PRN Temp greater or equal to 38.5C (101.3F)  ibuprofen  Tablet. 400 milliGRAM(s) Oral every 6 hours PRN Moderate Pain (4 - 6)      DVT Prophylaxis:    Advanced Directives:  Discussed with:    Visit Information:    ** Time is exclusive of billed procedures and/or teaching and/or routine family updates.

## 2020-07-05 PROCEDURE — 99232 SBSQ HOSP IP/OBS MODERATE 35: CPT

## 2020-07-05 RX ADMIN — QUETIAPINE FUMARATE 100 MILLIGRAM(S): 200 TABLET, FILM COATED ORAL at 09:13

## 2020-07-05 RX ADMIN — Medication 0.5 MILLIGRAM(S): at 09:13

## 2020-07-05 RX ADMIN — BUDESONIDE AND FORMOTEROL FUMARATE DIHYDRATE 2 PUFF(S): 160; 4.5 AEROSOL RESPIRATORY (INHALATION) at 21:29

## 2020-07-05 RX ADMIN — ALBUTEROL 2 PUFF(S): 90 AEROSOL, METERED ORAL at 09:14

## 2020-07-05 RX ADMIN — ALBUTEROL 2 PUFF(S): 90 AEROSOL, METERED ORAL at 11:45

## 2020-07-05 RX ADMIN — ALBUTEROL 2 PUFF(S): 90 AEROSOL, METERED ORAL at 21:29

## 2020-07-05 RX ADMIN — ENOXAPARIN SODIUM 40 MILLIGRAM(S): 100 INJECTION SUBCUTANEOUS at 21:19

## 2020-07-05 RX ADMIN — FAMOTIDINE 20 MILLIGRAM(S): 10 INJECTION INTRAVENOUS at 09:13

## 2020-07-05 RX ADMIN — Medication 25 MILLIGRAM(S): at 21:18

## 2020-07-05 RX ADMIN — POLYETHYLENE GLYCOL 3350 17 GRAM(S): 17 POWDER, FOR SOLUTION ORAL at 12:47

## 2020-07-05 RX ADMIN — Medication 25 MILLIGRAM(S): at 09:13

## 2020-07-05 RX ADMIN — Medication 100 MILLIGRAM(S): at 09:13

## 2020-07-05 RX ADMIN — CLOPIDOGREL BISULFATE 75 MILLIGRAM(S): 75 TABLET, FILM COATED ORAL at 09:13

## 2020-07-05 RX ADMIN — Medication 60 MILLIGRAM(S): at 17:36

## 2020-07-05 RX ADMIN — TIOTROPIUM BROMIDE 1 CAPSULE(S): 18 CAPSULE ORAL; RESPIRATORY (INHALATION) at 09:14

## 2020-07-05 RX ADMIN — GABAPENTIN 400 MILLIGRAM(S): 400 CAPSULE ORAL at 21:18

## 2020-07-05 RX ADMIN — ALBUTEROL 2 PUFF(S): 90 AEROSOL, METERED ORAL at 15:21

## 2020-07-05 RX ADMIN — Medication 5 MILLIGRAM(S): at 21:18

## 2020-07-05 RX ADMIN — Medication 60 MILLIGRAM(S): at 05:39

## 2020-07-05 RX ADMIN — BUDESONIDE AND FORMOTEROL FUMARATE DIHYDRATE 2 PUFF(S): 160; 4.5 AEROSOL RESPIRATORY (INHALATION) at 09:13

## 2020-07-05 RX ADMIN — GABAPENTIN 400 MILLIGRAM(S): 400 CAPSULE ORAL at 05:39

## 2020-07-05 RX ADMIN — QUETIAPINE FUMARATE 100 MILLIGRAM(S): 200 TABLET, FILM COATED ORAL at 15:37

## 2020-07-05 RX ADMIN — Medication 60 MILLIGRAM(S): at 12:47

## 2020-07-05 RX ADMIN — Medication 81 MILLIGRAM(S): at 09:13

## 2020-07-05 RX ADMIN — Medication 2 MILLIGRAM(S): at 21:18

## 2020-07-05 RX ADMIN — SENNA PLUS 2 TABLET(S): 8.6 TABLET ORAL at 21:18

## 2020-07-05 RX ADMIN — QUETIAPINE FUMARATE 200 MILLIGRAM(S): 200 TABLET, FILM COATED ORAL at 21:18

## 2020-07-05 RX ADMIN — ENOXAPARIN SODIUM 40 MILLIGRAM(S): 100 INJECTION SUBCUTANEOUS at 09:13

## 2020-07-05 RX ADMIN — FAMOTIDINE 20 MILLIGRAM(S): 10 INJECTION INTRAVENOUS at 21:18

## 2020-07-05 RX ADMIN — Medication 0.5 MILLIGRAM(S): at 21:18

## 2020-07-05 RX ADMIN — GABAPENTIN 400 MILLIGRAM(S): 400 CAPSULE ORAL at 15:36

## 2020-07-05 NOTE — PROGRESS NOTE ADULT - [facility]T [facility]
regular rate and rhythm

## 2020-07-05 NOTE — PROGRESS NOTE ADULT - ASSESSMENT
A/P: 65 male with acute respiratory failure from altered awareness and sedation  COPD  AFIB s/p watchman  CHF  HTN  ETOH abuse    Plan:   PULM: S/P Trach, with a cuffless #6 fenestrated    Cardio: Continue ASA and Plavix.  Continue Cardizem and Lopressor for AFIB     Renal: Cr. Stable    GI: Continue bolus feeds but will D/C the Dysphagia I and honey thick liquids.  He likely should have swallow reevaluate  early in the week     PSY: Thiamine for chronic ETOH abuse and thiamine deficiency folate, Seroquel and Klonopin for agitation      Lovenox DVT Prophylaxis    PT    Needs placement

## 2020-07-05 NOTE — PROGRESS NOTE ADULT - SUBJECTIVE AND OBJECTIVE BOX
HPI: Patient seen at a  this morning for AMS and hypoxic  Patient had been admitted with a COPD exacerbation.  It was believed that he went into ETOH withdrawal and had a total of 6MG Ativan over night.  He required intubation 4/23.    4/24: he remains intubated.  Will try to wean this afternoon.      4/25: He weaned this morning and failed with a decreased Tv.  But close to extubation.    4/26: Patient weaned well this morning and was extubated.      5/2: Chart reviewed, Patient was reintubated, failed weaning this morning, CXR with improving infiltrates  5/3: Patient remains on the vent failed weaning x3 today with increased RR and low TV, Off Levophed now  5/4: Failed weaning yesterday, had to change his ET tube yesterday because he chewed through it,     5/5: Pt on Precedex and Diprivan for sedation, Patient failed weaning again today  5/6: Patient failed weaning today with a low TV and High RR  5/7: He again failed weaning, NGT replaced, Fi O2 back to 40%, WBC normal,     5/8: For weaning today, he has failed weaning daily  5/9: Weaning this morning off levo  5/10: He Failed weaning this morning    5/14: Patient failed weaning again this morning.  For Trach and PEG in 5/15    5/17: S/P Trach and PEG.  Weaned for several hours yesterday  5/18: Weaned for under an hour yesterday but desated.  Temps to 104 x 2 over night.    5/19: No Weaning yesterday because of the ongoing temps.  Now with MRSA in the Blood.    5/20: Temps down, now off precedex  5/21: Failed weaning yesterday, BC remain positive      6/29: Chart reviewed.  Agitation noted over night.  Patient now on TC.  He is awake this morning.  No temps WBC normal   6/30: Patient has been off restraints since yesterday, he is now able to take PO, no temps  7/1: Patient still confused at times, continues to be off restraints, there was of a question of aspiration of his dysphagia diet yesterday, no temps  7/2: Confusion improving, no events over night  7/3: No events over night  7/4: No events over night.  Some periods of confusion  7/5: No events in the last 24H, some mild periods of confusion and impulsive behaviour     PAST MEDICAL HX  Alcohol abuse    Alcohol withdrawal    Anemia    AFIB atrial fibrillation    CHF (congestive heart failure)    Chronic atrial fibrillation    Chronic CHF    Chronic obstructive pulmonary disease (COPD)    Cirrhosis    Collapsed lung    COPD without exacerbation    Emphysema of lung    Falls    GERD (gastroesophageal reflux disease)    HTN hypertension    Meningitis    Poor historian    Presence of Watchman left atrial appendage closure device.    PAST SURGICAL HX  Presence of Watchman left atrial appendage closure device    S/P BKA (below knee amputation) unilateral, left    Unilateral amputation of lower extremity below knee.      FAMILY HX  Family history unknown: Adopted      SOCIAL HX  lives alone  former smoker  alcohol use last 2-3 days ago (19 Apr 2020 03:19)       PAST MEDICAL & SURGICAL HISTORY:  Chronic CHF  COPD without exacerbation  Atrial fibrillation  Presence of Watchman left atrial appendage closure device  Hypertension  GERD (gastroesophageal reflux disease)  Chronic obstructive pulmonary disease (COPD)  Anemia  Falls  Meningitis  Collapsed lung  Alcohol withdrawal  Emphysema of lung  Cirrhosis  CHF (congestive heart failure)  Poor historian  Alcohol abuse  Chronic atrial fibrillation  Unilateral amputation of lower extremity below knee  Presence of Watchman left atrial appendage closure device  S/P BKA (below knee amputation) unilateral, left      FAMILY HISTORY:  No pertinent family history in first degree relatives  Family history unknown: Adopted      Social Hx:    Allergies    Ceclor (Rash)  Duricef (Rash)    Intolerances            ICU Vital Signs Last 24 Hrs  T(C): 36.6 (05 Jul 2020 06:00), Max: 36.6 (04 Jul 2020 19:00)  T(F): 97.9 (05 Jul 2020 06:00), Max: 97.9 (05 Jul 2020 06:00)  HR: 95 (05 Jul 2020 08:00) (68 - 119)  BP: 114/73 (05 Jul 2020 08:00) (91/66 - 131/58)  BP(mean): 83 (05 Jul 2020 08:00) (56 - 87)  ABP: --  ABP(mean): --  RR: 23 (05 Jul 2020 08:00) (20 - 33)  SpO2: 97% (05 Jul 2020 08:00) (91% - 100%)          I&O's Summary    04 Jul 2020 07:01  -  05 Jul 2020 07:00  --------------------------------------------------------  IN: 2900 mL / OUT: 1025 mL / NET: 1875 mL    MEDICATIONS  (STANDING):  ALBUTerol    90 MICROgram(s) HFA Inhaler 2 Puff(s) Inhalation every 4 hours  aspirin  chewable 81 milliGRAM(s) Oral daily  budesonide 160 MICROgram(s)/formoterol 4.5 MICROgram(s) Inhaler 2 Puff(s) Inhalation two times a day  clonazePAM  Tablet 0.5 milliGRAM(s) Oral two times a day  clopidogrel Tablet 75 milliGRAM(s) Oral daily  diltiazem    Tablet 60 milliGRAM(s) Oral every 6 hours  doxazosin 2 milliGRAM(s) Oral at bedtime  enoxaparin Injectable 40 milliGRAM(s) SubCutaneous every 12 hours  famotidine    Tablet 20 milliGRAM(s) Oral two times a day  gabapentin   Solution 400 milliGRAM(s) Enteral Tube three times a day  melatonin 5 milliGRAM(s) Oral at bedtime  metoprolol tartrate 25 milliGRAM(s) Oral two times a day  polyethylene glycol 3350 17 Gram(s) Oral daily  QUEtiapine 200 milliGRAM(s) Oral at bedtime  QUEtiapine 100 milliGRAM(s) Oral <User Schedule>  senna 2 Tablet(s) Oral at bedtime  thiamine 100 milliGRAM(s) Oral daily  tiotropium 18 MICROgram(s) Capsule 1 Capsule(s) Inhalation daily    MEDICATIONS  (PRN):  acetaminophen   Tablet .. 650 milliGRAM(s) Oral every 6 hours PRN Temp greater or equal to 38.5C (101.3F)  ibuprofen  Tablet. 400 milliGRAM(s) Oral every 6 hours PRN Moderate Pain (4 - 6)      DVT Prophylaxis: Lovenox    Advanced Directives:  Discussed with:    Visit Information:    ** Time is exclusive of billed procedures and/or teaching and/or routine family updates.

## 2020-07-06 ENCOUNTER — TRANSCRIPTION ENCOUNTER (OUTPATIENT)
Age: 65
End: 2020-07-06

## 2020-07-06 LAB — SARS-COV-2 RNA SPEC QL NAA+PROBE: SIGNIFICANT CHANGE UP

## 2020-07-06 PROCEDURE — 99232 SBSQ HOSP IP/OBS MODERATE 35: CPT

## 2020-07-06 RX ADMIN — Medication 0.5 MILLIGRAM(S): at 10:07

## 2020-07-06 RX ADMIN — QUETIAPINE FUMARATE 200 MILLIGRAM(S): 200 TABLET, FILM COATED ORAL at 21:08

## 2020-07-06 RX ADMIN — Medication 2 MILLIGRAM(S): at 21:08

## 2020-07-06 RX ADMIN — Medication 5 MILLIGRAM(S): at 21:08

## 2020-07-06 RX ADMIN — GABAPENTIN 400 MILLIGRAM(S): 400 CAPSULE ORAL at 21:08

## 2020-07-06 RX ADMIN — BUDESONIDE AND FORMOTEROL FUMARATE DIHYDRATE 2 PUFF(S): 160; 4.5 AEROSOL RESPIRATORY (INHALATION) at 08:19

## 2020-07-06 RX ADMIN — GABAPENTIN 400 MILLIGRAM(S): 400 CAPSULE ORAL at 06:22

## 2020-07-06 RX ADMIN — QUETIAPINE FUMARATE 100 MILLIGRAM(S): 200 TABLET, FILM COATED ORAL at 10:33

## 2020-07-06 RX ADMIN — ALBUTEROL 2 PUFF(S): 90 AEROSOL, METERED ORAL at 08:19

## 2020-07-06 RX ADMIN — POLYETHYLENE GLYCOL 3350 17 GRAM(S): 17 POWDER, FOR SOLUTION ORAL at 10:07

## 2020-07-06 RX ADMIN — GABAPENTIN 400 MILLIGRAM(S): 400 CAPSULE ORAL at 14:07

## 2020-07-06 RX ADMIN — Medication 60 MILLIGRAM(S): at 06:22

## 2020-07-06 RX ADMIN — SENNA PLUS 2 TABLET(S): 8.6 TABLET ORAL at 21:09

## 2020-07-06 RX ADMIN — ENOXAPARIN SODIUM 40 MILLIGRAM(S): 100 INJECTION SUBCUTANEOUS at 21:09

## 2020-07-06 RX ADMIN — Medication 0.5 MILLIGRAM(S): at 21:07

## 2020-07-06 RX ADMIN — Medication 60 MILLIGRAM(S): at 13:48

## 2020-07-06 RX ADMIN — TIOTROPIUM BROMIDE 1 CAPSULE(S): 18 CAPSULE ORAL; RESPIRATORY (INHALATION) at 08:18

## 2020-07-06 RX ADMIN — ENOXAPARIN SODIUM 40 MILLIGRAM(S): 100 INJECTION SUBCUTANEOUS at 10:08

## 2020-07-06 RX ADMIN — CLOPIDOGREL BISULFATE 75 MILLIGRAM(S): 75 TABLET, FILM COATED ORAL at 10:07

## 2020-07-06 RX ADMIN — Medication 60 MILLIGRAM(S): at 01:10

## 2020-07-06 RX ADMIN — Medication 25 MILLIGRAM(S): at 10:07

## 2020-07-06 RX ADMIN — Medication 25 MILLIGRAM(S): at 21:07

## 2020-07-06 RX ADMIN — ALBUTEROL 2 PUFF(S): 90 AEROSOL, METERED ORAL at 21:07

## 2020-07-06 RX ADMIN — Medication 81 MILLIGRAM(S): at 10:07

## 2020-07-06 RX ADMIN — QUETIAPINE FUMARATE 100 MILLIGRAM(S): 200 TABLET, FILM COATED ORAL at 17:48

## 2020-07-06 RX ADMIN — FAMOTIDINE 20 MILLIGRAM(S): 10 INJECTION INTRAVENOUS at 10:07

## 2020-07-06 RX ADMIN — Medication 60 MILLIGRAM(S): at 17:48

## 2020-07-06 RX ADMIN — FAMOTIDINE 20 MILLIGRAM(S): 10 INJECTION INTRAVENOUS at 21:08

## 2020-07-06 RX ADMIN — Medication 100 MILLIGRAM(S): at 10:07

## 2020-07-06 RX ADMIN — BUDESONIDE AND FORMOTEROL FUMARATE DIHYDRATE 2 PUFF(S): 160; 4.5 AEROSOL RESPIRATORY (INHALATION) at 21:07

## 2020-07-06 NOTE — PROGRESS NOTE ADULT - NEUROLOGICAL
detailed exam

## 2020-07-06 NOTE — PROGRESS NOTE ADULT - SUBJECTIVE AND OBJECTIVE BOX
66 y/o M admitted on 4/18 with COPD exacerbation -- RRT called on 4/23 for AMS requiring intubation  extubated on 4/26 then re intubated on 4/29.    S/P Trach and PEG on 5/15 and has failed SBT.      Course complicated by MSSA sepsis and CRP in sputum-- treated with IV vanco and Cipro.    MYA 5/26 - NO evidence of vegetations  He has also suffered from encephalopathy and delirium  Periodic agitation and aggressive behavior requiring titration of sedatives and antipsychotic agents to control behavior.  6/8: Trach changed today by Thoracic sx team.     Events of last 7 days discussed with Dr Crews who has been caring for the patient.    7/6: Hemodynamics and respiratory function reasonable - pt trach remains capped.  He is OOB to chair and working with PT.  Patient behavior is much improved on his current medication regimen and he is awaiting placement in rehab.    PMHx - chronic AFib w Watchman device, CHF, COPD, HTN, obesity     Allergies    Ceclor (Rash)  Duricef (Rash)      ICU Vital Signs Last 24 Hrs  T(C): 37.4 (06 Jul 2020 04:00), Max: 37.4 (06 Jul 2020 04:00)  T(F): 99.4 (06 Jul 2020 04:00), Max: 99.4 (06 Jul 2020 04:00)  HR: 107 (06 Jul 2020 11:00) (79 - 130)  BP: 122/65 (06 Jul 2020 11:00) (105/60 - 132/72)  BP(mean): 77 (06 Jul 2020 11:00) (66 - 87)  RR: 32 (06 Jul 2020 11:00) (19 - 32)  SpO2: 94% (06 Jul 2020 11:00) (92% - 100%)          I&O's Summary    05 Jul 2020 07:01  -  06 Jul 2020 07:00  --------------------------------------------------------  IN: 2650 mL / OUT: 1380 mL / NET: 1270 mL    06 Jul 2020 07:01  -  06 Jul 2020 12:10  --------------------------------------------------------  IN: 700 mL / OUT: 375 mL / NET: 325 mL        MEDICATIONS  (STANDING):  ALBUTerol    90 MICROgram(s) HFA Inhaler 2 Puff(s) Inhalation every 4 hours  aspirin  chewable 81 milliGRAM(s) Oral daily  budesonide 160 MICROgram(s)/formoterol 4.5 MICROgram(s) Inhaler 2 Puff(s) Inhalation two times a day  clonazePAM  Tablet 0.5 milliGRAM(s) Oral two times a day  clopidogrel Tablet 75 milliGRAM(s) Oral daily  diltiazem    Tablet 60 milliGRAM(s) Oral every 6 hours  doxazosin 2 milliGRAM(s) Oral at bedtime  enoxaparin Injectable 40 milliGRAM(s) SubCutaneous every 12 hours  famotidine    Tablet 20 milliGRAM(s) Oral two times a day  gabapentin   Solution 400 milliGRAM(s) Enteral Tube three times a day  melatonin 5 milliGRAM(s) Oral at bedtime  metoprolol tartrate 25 milliGRAM(s) Oral two times a day  polyethylene glycol 3350 17 Gram(s) Oral daily  QUEtiapine 200 milliGRAM(s) Oral at bedtime  QUEtiapine 100 milliGRAM(s) Oral <User Schedule>  senna 2 Tablet(s) Oral at bedtime  thiamine 100 milliGRAM(s) Oral daily  tiotropium 18 MICROgram(s) Capsule 1 Capsule(s) Inhalation daily    MEDICATIONS  (PRN):  acetaminophen   Tablet .. 650 milliGRAM(s) Oral every 6 hours PRN Temp greater or equal to 38.5C (101.3F)  ibuprofen  Tablet. 400 milliGRAM(s) Oral every 6 hours PRN Moderate Pain (4 - 6)      Review of Systems:   · Additional ROS	Unobtainable due to clinical condition	    Physical Exam:   · Constitutional	detailed exam	  · Constitutional Details	ill; restless  poorly cooperative at times but also periods of lucidity and cooperation/appropriate behavior	  · ENMT	detailed exam	  · ENMT Comments	Trach in situ - Trach collar in place	  · Respiratory	detailed exam	  · Respiratory Details	breath sounds equal B/L; NO sig W/R/R	  · Cardiovascular	detailed exam	  · Cardiovascular Details	irregular rate and rhythm	  · Gastrointestinal	detailed exam	  · GI Normal	soft; NT/ND (+) BS	  · GI Abnormal	+ PEG in situ; Site is CDI	  · Extremities	detailed exam	  · Extremities Details	no cyanosis; LLE BKA	  · Neurological	detailed exam	  · Neurological Details	Encephalopathy persists - worse at times, but at times he is AA and interactive - nodding appropraitely to questions and following simple command. MARIE spont NO gross motor or sensory deficits	  · Skin	detailed exam	  · Skin Details	warm and dry	        DVT Prophylaxis: Lovenox q12h, venodynes    Visit Information:  SSQ 64 y/o M admitted on 4/18 with COPD exacerbation -- RRT called on 4/23 for AMS requiring intubation  extubated on 4/26 then re intubated on 4/29.    S/P Trach and PEG on 5/15 and has failed SBT.      Course complicated by MSSA sepsis and CRP in sputum-- treated with IV vanco and Cipro.    MYA 5/26 - NO evidence of vegetations  He has also suffered from encephalopathy and delirium  Periodic agitation and aggressive behavior requiring titration of sedatives and antipsychotic agents to control behavior.  6/8: Trach changed today by Thoracic sx team.     Events of last 7 days discussed with Dr Crews who has been caring for the patient.    7/6: Hemodynamics and respiratory function reasonable - pt trach remains capped.  He is OOB to chair and working with PT.  Patient behavior is much improved on his current medication regimen and he is awaiting placement in rehab.    PMHx - chronic AFib w Watchman device, CHF, COPD, HTN, obesity     Allergies    Ceclor (Rash)  Duricef (Rash)      ICU Vital Signs Last 24 Hrs  T(C): 37.4 (06 Jul 2020 04:00), Max: 37.4 (06 Jul 2020 04:00)  T(F): 99.4 (06 Jul 2020 04:00), Max: 99.4 (06 Jul 2020 04:00)  HR: 107 (06 Jul 2020 11:00) (79 - 130)  BP: 122/65 (06 Jul 2020 11:00) (105/60 - 132/72)  BP(mean): 77 (06 Jul 2020 11:00) (66 - 87)  RR: 32 (06 Jul 2020 11:00) (19 - 32)  SpO2: 94% (06 Jul 2020 11:00) (92% - 100%)          I&O's Summary    05 Jul 2020 07:01  -  06 Jul 2020 07:00  --------------------------------------------------------  IN: 2650 mL / OUT: 1380 mL / NET: 1270 mL    06 Jul 2020 07:01  -  06 Jul 2020 12:10  --------------------------------------------------------  IN: 700 mL / OUT: 375 mL / NET: 325 mL        MEDICATIONS  (STANDING):  ALBUTerol    90 MICROgram(s) HFA Inhaler 2 Puff(s) Inhalation every 4 hours  aspirin  chewable 81 milliGRAM(s) Oral daily  budesonide 160 MICROgram(s)/formoterol 4.5 MICROgram(s) Inhaler 2 Puff(s) Inhalation two times a day  clonazePAM  Tablet 0.5 milliGRAM(s) Oral two times a day  clopidogrel Tablet 75 milliGRAM(s) Oral daily  diltiazem    Tablet 60 milliGRAM(s) Oral every 6 hours  doxazosin 2 milliGRAM(s) Oral at bedtime  enoxaparin Injectable 40 milliGRAM(s) SubCutaneous every 12 hours  famotidine    Tablet 20 milliGRAM(s) Oral two times a day  gabapentin   Solution 400 milliGRAM(s) Enteral Tube three times a day  melatonin 5 milliGRAM(s) Oral at bedtime  metoprolol tartrate 25 milliGRAM(s) Oral two times a day  polyethylene glycol 3350 17 Gram(s) Oral daily  QUEtiapine 200 milliGRAM(s) Oral at bedtime  QUEtiapine 100 milliGRAM(s) Oral <User Schedule>  senna 2 Tablet(s) Oral at bedtime  thiamine 100 milliGRAM(s) Oral daily  tiotropium 18 MICROgram(s) Capsule 1 Capsule(s) Inhalation daily    MEDICATIONS  (PRN):  acetaminophen   Tablet .. 650 milliGRAM(s) Oral every 6 hours PRN Temp greater or equal to 38.5C (101.3F)  ibuprofen  Tablet. 400 milliGRAM(s) Oral every 6 hours PRN Moderate Pain (4 - 6)      DVT Prophylaxis: Lovenox q12h, venodynes    Visit Information:  SSQ

## 2020-07-06 NOTE — PROGRESS NOTE ADULT - NECK DETAILS
Cap trach
TC
Trach in situ
Trach in situ connected to Vent
Trach in situ on TC
Trach in situ.  Cap
Trach in situ.  Capped
s/p trach, site clean
TC
no JVD/trach in situ; capped/supple

## 2020-07-06 NOTE — PROGRESS NOTE ADULT - CARDIOVASCULAR DETAILS
positive S1/positive S2
irregular rate and rhythm/positive S1/positive S2
positive S2/positive S1
positive S2/positive S1
positive S1/irregular rate and rhythm/positive S2
positive S2/irregular rate and rhythm/positive S1
positive S1/positive S2
positive S2/positive S1
positive S2/tachycardia/positive S1
positive S1/positive S2
positive S2/positive S1
positive S2/positive S1
irregular rate and rhythm
irregular rate and rhythm/tachycardia
irregular rate and rhythm
positive S2/positive S1
irregular rate and rhythm
irregular rate and rhythm/positive S1/positive S2
irregular rate and rhythm/tachycardia
positive S1/positive S2

## 2020-07-06 NOTE — DISCHARGE NOTE NURSING/CASE MANAGEMENT/SOCIAL WORK - PATIENT PORTAL LINK FT
You can access the FollowMyHealth Patient Portal offered by [facility] by registering at the following website: [email or web address]. By joining [employer]’s FollowMyHealth portal, you will also be able to view your health information using other applications (apps) compatible with our system.

## 2020-07-06 NOTE — PROGRESS NOTE ADULT - COMMENTS
Unobtainable due to clinical condition
not reliable
Unobtainable due to clinical condition
not reliable
offers no specific complaints; poor historian
not reliable

## 2020-07-06 NOTE — PROGRESS NOTE ADULT - SUBJECTIVE AND OBJECTIVE BOX
Subjective:    pat trach capped, no respiratory complaint.    Home Medications:  acetaminophen 325 mg oral tablet: 2 tab(s) orally every 6 hours, As needed, Temp greater or equal to 38.5C (101.3F) (02 Jun 2020 12:53)  albuterol 90 mcg/inh inhalation aerosol: 2 puff(s) inhaled every 4 hours (02 Jun 2020 12:53)  ALPRAZolam 0.5 mg oral tablet: 1 tab(s) orally every 6 hours (02 Jun 2020 12:53)  aspirin 81 mg oral tablet, chewable: 1 tab(s) orally once a day (02 Jun 2020 12:53)  clopidogrel 75 mg oral tablet: 1 tab(s) orally once a day (02 Jun 2020 12:53)  dilTIAZem 90 mg oral tablet: 1 tab(s) orally every 6 hours (02 Jun 2020 12:53)  doxazosin 2 mg oral tablet: 1 tab(s) orally once a day (at bedtime) (02 Jun 2020 12:53)  gabapentin 400 mg oral capsule: 1 cap(s) orally 3 times a day (02 Jun 2020 12:53)  pantoprazole 40 mg oral granule, enteric coated: 40 milligram(s) orally once a day (19 Apr 2020 03:12)  QUEtiapine 100 mg oral tablet: 1 tab(s) orally once a day (at bedtime) (02 Jun 2020 12:53)  QUEtiapine 50 mg oral tablet: 1 tab(s) orally once a day (02 Jun 2020 12:53)  Spiriva 18 mcg inhalation capsule: 1 cap(s) inhaled once a day (19 Apr 2020 03:12)  thiamine 100 mg oral tablet: 1 tab(s) orally once a day (02 Jun 2020 12:53)    MEDICATIONS  (STANDING):  ALBUTerol    90 MICROgram(s) HFA Inhaler 2 Puff(s) Inhalation every 4 hours  aspirin  chewable 81 milliGRAM(s) Oral daily  budesonide 160 MICROgram(s)/formoterol 4.5 MICROgram(s) Inhaler 2 Puff(s) Inhalation two times a day  clonazePAM  Tablet 0.5 milliGRAM(s) Oral two times a day  clopidogrel Tablet 75 milliGRAM(s) Oral daily  diltiazem    Tablet 60 milliGRAM(s) Oral every 6 hours  doxazosin 2 milliGRAM(s) Oral at bedtime  enoxaparin Injectable 40 milliGRAM(s) SubCutaneous every 12 hours  famotidine    Tablet 20 milliGRAM(s) Oral two times a day  gabapentin   Solution 400 milliGRAM(s) Enteral Tube three times a day  melatonin 5 milliGRAM(s) Oral at bedtime  metoprolol tartrate 25 milliGRAM(s) Oral two times a day  polyethylene glycol 3350 17 Gram(s) Oral daily  QUEtiapine 200 milliGRAM(s) Oral at bedtime  QUEtiapine 100 milliGRAM(s) Oral <User Schedule>  senna 2 Tablet(s) Oral at bedtime  thiamine 100 milliGRAM(s) Oral daily  tiotropium 18 MICROgram(s) Capsule 1 Capsule(s) Inhalation daily    MEDICATIONS  (PRN):  acetaminophen   Tablet .. 650 milliGRAM(s) Oral every 6 hours PRN Temp greater or equal to 38.5C (101.3F)  ibuprofen  Tablet. 400 milliGRAM(s) Oral every 6 hours PRN Moderate Pain (4 - 6)      Allergies    Ceclor (Rash)  Duricef (Rash)    Intolerances        Vital Signs Last 24 Hrs  T(C): 37.4 (06 Jul 2020 04:00), Max: 37.4 (06 Jul 2020 04:00)  T(F): 99.4 (06 Jul 2020 04:00), Max: 99.4 (06 Jul 2020 04:00)  HR: 84 (06 Jul 2020 13:00) (79 - 130)  BP: 103/49 (06 Jul 2020 13:00) (103/49 - 132/72)  BP(mean): 61 (06 Jul 2020 13:00) (61 - 87)  RR: 30 (06 Jul 2020 13:00) (19 - 32)  SpO2: 94% (06 Jul 2020 13:00) (92% - 100%)      PHYSICAL EXAMINATION:    NECK:  Supple. No lymphadenopathy. Jugular venous pressure not elevated. Carotids equal.   HEART:   The cardiac impulse has a normal quality. Reg., Nl S1 and S2.  There are no murmurs, rubs or gallops noted  CHEST:  Chest is clear to auscultation. Normal respiratory effort.  ABDOMEN:  Soft and nontender.   EXTREMITIES:  There is no edema.       LABS:

## 2020-07-06 NOTE — PROGRESS NOTE ADULT - GASTROINTESTINAL DETAILS
no distention/soft
soft/no distention
no distention/soft
no distention/soft
soft/nontender
soft
soft/no distention
bowel sounds normal/nontender
no distention/soft
soft
soft/no distention
no distention/soft
soft/no distention
soft/no distention
nontender/soft
soft
soft/nontender
soft/nontender
no distention/soft
soft/nontender
soft
soft/no distention
soft/bowel sounds normal/no distention/nontender

## 2020-07-06 NOTE — PROGRESS NOTE ADULT - SUBJECTIVE AND OBJECTIVE BOX
Subjective: No acute events overnight.    Vital Signs:  Vital Signs Last 24 Hrs  T(C): 37.4 (07-06-20 @ 04:00), Max: 37.4 (07-06-20 @ 04:00)  T(F): 99.4 (07-06-20 @ 04:00), Max: 99.4 (07-06-20 @ 04:00)  HR: 105 (07-06-20 @ 08:18) (74 - 130)  BP: 108/55 (07-06-20 @ 08:00) (97/67 - 132/72)  RR: 24 (07-06-20 @ 08:00) (19 - 32)  SpO2: 97% (07-06-20 @ 08:18) (91% - 100%) on room air    Telemetry/Alarms:  atrial fibrillation    Relevant labs, radiology and Medications reviewed      MEDICATIONS  (STANDING):  ALBUTerol    90 MICROgram(s) HFA Inhaler 2 Puff(s) Inhalation every 4 hours  aspirin  chewable 81 milliGRAM(s) Oral daily  budesonide 160 MICROgram(s)/formoterol 4.5 MICROgram(s) Inhaler 2 Puff(s) Inhalation two times a day  clonazePAM  Tablet 0.5 milliGRAM(s) Oral two times a day  clopidogrel Tablet 75 milliGRAM(s) Oral daily  diltiazem    Tablet 60 milliGRAM(s) Oral every 6 hours  doxazosin 2 milliGRAM(s) Oral at bedtime  enoxaparin Injectable 40 milliGRAM(s) SubCutaneous every 12 hours  famotidine    Tablet 20 milliGRAM(s) Oral two times a day  gabapentin   Solution 400 milliGRAM(s) Enteral Tube three times a day  melatonin 5 milliGRAM(s) Oral at bedtime  metoprolol tartrate 25 milliGRAM(s) Oral two times a day  polyethylene glycol 3350 17 Gram(s) Oral daily  QUEtiapine 200 milliGRAM(s) Oral at bedtime  QUEtiapine 100 milliGRAM(s) Oral <User Schedule>  senna 2 Tablet(s) Oral at bedtime  thiamine 100 milliGRAM(s) Oral daily  tiotropium 18 MICROgram(s) Capsule 1 Capsule(s) Inhalation daily    MEDICATIONS  (PRN):  acetaminophen   Tablet .. 650 milliGRAM(s) Oral every 6 hours PRN Temp greater or equal to 38.5C (101.3F)  ibuprofen  Tablet. 400 milliGRAM(s) Oral every 6 hours PRN Moderate Pain (4 - 6)      Physical exam  Gen: NAD breathing comfortably  Neuro: AO answering appropriately  Card: S1 S2  Pulm: Trach in place, c/d/i  Abd: Soft NT ND, PEG in place c/d/i  Ext: no edema      I&O's Summary    05 Jul 2020 07:01  -  06 Jul 2020 07:00  --------------------------------------------------------  IN: 2650 mL / OUT: 1380 mL / NET: 1270 mL    06 Jul 2020 07:01  -  06 Jul 2020 08:29  --------------------------------------------------------  IN: 0 mL / OUT: 375 mL / NET: -375 mL        Assessment  65y Male  w/ PAST MEDICAL & SURGICAL HISTORY:  Chronic CHF  COPD without exacerbation  Atrial fibrillation  Presence of Watchman left atrial appendage closure device  Hypertension  GERD (gastroesophageal reflux disease)  Chronic obstructive pulmonary disease (COPD)  Anemia  Falls  Meningitis  Collapsed lung  Alcohol withdrawal  Emphysema of lung  Cirrhosis  CHF (congestive heart failure)  Poor historian  Alcohol abuse  Chronic atrial fibrillation  Unilateral amputation of lower extremity below knee  Presence of Watchman left atrial appendage closure device  S/P BKA (below knee amputation) unilateral, left    Patient is a 65y old  Male who presents with a chief complaint of acute hypoxemic, hypercapneic resp failure  COPD exacerbation (05 Jul 2020 09:04)      1.  Chronic respiratory failure  2.  COPD exacerbation  3.  Pneumonia  4.  Leukocytosis and fevers - now resolved  5.  Agitation and confusion    Continue capping trach as tolerated.  Continue diet.  Would not decannulate this hospital admission.  May follow up as outpatient with Dr. Morris for trach removal.  Discharge planning.    Discussed with Cardiothoracic Team at AM rounds.

## 2020-07-06 NOTE — PROGRESS NOTE ADULT - ASSESSMENT
PROBLEMS;    Febrile illness- GNR in sputum-pseudomonas/proteus-off abx  s/p staph bactermia/sputum pseudomonas/staph  Ac on chronic hypercapnic & hypoxamic respiratory failure-s/p trach & PEG  sputum-Few Pseudomonas aeruginosa (Carbapenem Resistant)/Moderate Methicillin resistant Staphylococcus aureus  afib with RVR  OHS/VIJAY  AC flare of COPD/chronic vs acute on chronic bronchitis  Mild interstitial lung disease-NSIP h/o smoking  COVID negativex2  Pulmonary hypertension  Nonobstructing stone in the left ureterovesicular junction/ nonobstructing stone in the lower pole right kidney.  Subacute hemorrhage in the right rectus abdominis along the anterior sheath, measures 1.6 cm in thickness and extends from the umbilicus to the inferior myotendinous junction  Cirrhosis  Mild splenomegaly-portal venous hypertension.  Chronic afib w Watchman device  CHF  BPH  GERD  ETOH abuse  seizures disorder    PLAN;    pulmonary stable  speach & swallow eval  trach suction PRN  Tube feeding as tolerated  supportive care  covid repeat testing-neg  Eliquis/asa 81  d/w staff

## 2020-07-06 NOTE — PROGRESS NOTE ADULT - CONSTITUTIONAL DETAILS
ill
no distress
restless
well-nourished
ill
well-nourished
no distress

## 2020-07-06 NOTE — PROGRESS NOTE ADULT - ASSESSMENT
64 y/o M admitted on 4/18 with COPD exacerbation -- RRT called on 4/23 for AMS requiring intubation  extubated on 4/26 then re intubated on 4/29.    S/P Trach and PEG on 5/15 and has failed SBT.      Course complicated by MSSA sepsis and CRP in sputum-- treated with IV vanco and Cipro.    MYA 5/26 - NO evidence of vegetations  Blood cultures 5/23 & 5/29:  No growth  atrial fibrillation RVR - better controlled at present  urinary retention - resolved with Cardura  He has also suffered from encephalopathy and delirium  Periodic agitation and aggressive behavior requiring titration of sedatives and antipsychotic agents to control behavior.  Current trach is cuffless #6 fenestrated    at present, pt hemodynamics and respiratory function reasonable  trach remains capped.    OOB to chair and working with PT.  behavior is much improved  restlessness/pulling at devices are inhibiting his placement in appropriate rehab/NH setting    PMHx - chronic AFib w Watchman device, CHF, COPD, HTN, obesity     Plan at this time is for discharge to NH/rehab when appropriate,  in the meantime will transfer to med-surg floor.  Care should include:  HOB 30  TF's  TC - keep capped as tolerated - would NOT decanulate given this is pts 3rd trach.  Seroquel & Klonopin - titrate to control restlessness / agitation as possible  Cont ASA for CAD and AFib-- Diltiazem 60mg q6   Plavix    stopped (5/26) Apixiban - No need with Watchman device  chemical VTE prophylaxis Lovenox 40mg SC q12h based on BMI  Continue Cardura for urinary retention  Bowel regimen as appropriate  Supportive care     Attending Attestation:   30 minutes spent on total encounter; more than 50% of the visit was spent counseling and/or coordinating care by the attending physician. 66 y/o M admitted on 4/18 with COPD exacerbation -- RRT called on 4/23 for AMS requiring intubation  extubated on 4/26 then re intubated on 4/29.    S/P Trach and PEG on 5/15 and has failed SBT.      Course complicated by MSSA sepsis and CRP in sputum-- treated with IV vanco and Cipro.    MYA 5/26 - NO evidence of vegetations  Blood cultures 5/23 & 5/29:  No growth  atrial fibrillation RVR - better controlled at present  urinary retention - resolved with Cardura  He has also suffered from encephalopathy and delirium  Periodic agitation and aggressive behavior requiring titration of sedatives and antipsychotic agents to control behavior.  Current trach is cuffless #6 fenestrated    at present, pt hemodynamics and respiratory function reasonable  trach remains capped.    OOB to chair and working with PT.  behavior is much improved  restlessness/pulling at devices are inhibiting his placement in appropriate rehab/NH setting    PMHx - chronic AFib w Watchman device, CHF, COPD, HTN, obesity     Plan at this time is for discharge to NH/rehab when appropriate,  in the meantime will transfer to med-surg floor.  Care should include:  HOB 30  TF's  TC - keep capped as tolerated - would NOT decannulate given this is pts 3rd trach.  Seroquel & Klonopin - titrate to control restlessness / agitation as possible  Cont ASA for CAD and AFib-- Diltiazem 60mg q6   Plavix    stopped (5/26) Apixiban - No need with Watchman device  chemical VTE prophylaxis Lovenox 40mg SC q12h based on BMI  Continue Cardura for urinary retention  Bowel regimen as appropriate  Supportive care     Attending Attestation:   30 minutes spent on total encounter; more than 50% of the visit was spent counseling and/or coordinating care by the attending physician.        case d/w Dr EDEN Davila of the hospitalist service who will assume care for the patient.  Discharge summary updated to reflect latest plan of care.

## 2020-07-06 NOTE — PROGRESS NOTE ADULT - CONSTITUTIONAL
detailed exam

## 2020-07-06 NOTE — PROGRESS NOTE ADULT - MS EXT PE MLT D E PC
no cyanosis/no clubbing
no cyanosis/no clubbing
no cyanosis
L BKA
L BKA/no cyanosis
L BKA/no cyanosis/no pedal edema
no cyanosis
no cyanosis/L BKA
no cyanosis/LLE amputation
no cyanosis/no pedal edema/L BKA
no cyanosis/pedal edema
no pedal edema/L BKA
no pedal edema/L BKA
no pedal edema/no cyanosis/L BKA
L BKA/no cyanosis
no cyanosis/no clubbing
no cyanosis
no cyanosis

## 2020-07-06 NOTE — PROGRESS NOTE ADULT - RS GEN PE MLT RESP DETAILS PC
breath sounds equal/no rhonchi/no wheezes/no rales
breath sounds equal/no rales/no rhonchi
breath sounds equal/no rales/no wheezes/no rhonchi
breath sounds equal/no wheezes/no rales
no rales/breath sounds equal
no rales/breath sounds equal/no rhonchi
no rales/no rhonchi/breath sounds equal
no rales/no rhonchi/breath sounds equal
no rales/no rhonchi/no wheezes/breath sounds equal
no rales/no rhonchi/no wheezes/breath sounds equal
no rales/no wheezes/no rhonchi/breath sounds equal
no rales/no wheezes/no rhonchi/breath sounds equal
no rhonchi/breath sounds equal/no rales/no wheezes
no rhonchi/no rales/breath sounds equal/no wheezes
no wheezes/breath sounds equal/no rales
no wheezes/breath sounds equal/no rhonchi/no rales
breath sounds equal/no rhonchi/no wheezes/no rales
no rhonchi/no wheezes/no rales/breath sounds equal
airway patent/breath sounds equal
breath sounds equal
breath sounds equal/good air movement
breath sounds equal/no rhonchi/no wheezes/no rales
good air movement/breath sounds equal
breath sounds equal
airway patent/respirations non-labored/breath sounds equal/good air movement
no wheezes/breath sounds equal/no rales/no rhonchi
breath sounds equal/no rhonchi/no wheezes/no rales
good air movement/breath sounds equal
no rales/breath sounds equal/no wheezes/no rhonchi
no rhonchi/breath sounds equal/no rales/no wheezes

## 2020-07-06 NOTE — PROGRESS NOTE ADULT - CARDIOVASCULAR
detailed exam
Regular rate & rhythm, normal S1, S2; no murmurs, gallops or rubs; no S3, S4
detailed exam

## 2020-07-07 LAB
ANION GAP SERPL CALC-SCNC: 4 MMOL/L — LOW (ref 5–17)
BUN SERPL-MCNC: 14 MG/DL — SIGNIFICANT CHANGE UP (ref 7–23)
CALCIUM SERPL-MCNC: 8.8 MG/DL — SIGNIFICANT CHANGE UP (ref 8.5–10.1)
CHLORIDE SERPL-SCNC: 104 MMOL/L — SIGNIFICANT CHANGE UP (ref 96–108)
CO2 SERPL-SCNC: 31 MMOL/L — SIGNIFICANT CHANGE UP (ref 22–31)
CREAT SERPL-MCNC: 0.6 MG/DL — SIGNIFICANT CHANGE UP (ref 0.5–1.3)
GLUCOSE SERPL-MCNC: 117 MG/DL — HIGH (ref 70–99)
HCT VFR BLD CALC: 32 % — LOW (ref 39–50)
HGB BLD-MCNC: 10.4 G/DL — LOW (ref 13–17)
MAGNESIUM SERPL-MCNC: 1.9 MG/DL — SIGNIFICANT CHANGE UP (ref 1.6–2.6)
MCHC RBC-ENTMCNC: 30.7 PG — SIGNIFICANT CHANGE UP (ref 27–34)
MCHC RBC-ENTMCNC: 32.5 GM/DL — SIGNIFICANT CHANGE UP (ref 32–36)
MCV RBC AUTO: 94.4 FL — SIGNIFICANT CHANGE UP (ref 80–100)
PHOSPHATE SERPL-MCNC: 4.3 MG/DL — SIGNIFICANT CHANGE UP (ref 2.5–4.5)
PLATELET # BLD AUTO: 213 K/UL — SIGNIFICANT CHANGE UP (ref 150–400)
POTASSIUM SERPL-MCNC: 4.1 MMOL/L — SIGNIFICANT CHANGE UP (ref 3.5–5.3)
POTASSIUM SERPL-SCNC: 4.1 MMOL/L — SIGNIFICANT CHANGE UP (ref 3.5–5.3)
RBC # BLD: 3.39 M/UL — LOW (ref 4.2–5.8)
RBC # FLD: 14.3 % — SIGNIFICANT CHANGE UP (ref 10.3–14.5)
SODIUM SERPL-SCNC: 139 MMOL/L — SIGNIFICANT CHANGE UP (ref 135–145)
WBC # BLD: 6.23 K/UL — SIGNIFICANT CHANGE UP (ref 3.8–10.5)
WBC # FLD AUTO: 6.23 K/UL — SIGNIFICANT CHANGE UP (ref 3.8–10.5)

## 2020-07-07 PROCEDURE — 99232 SBSQ HOSP IP/OBS MODERATE 35: CPT

## 2020-07-07 RX ORDER — CLONAZEPAM 1 MG
0.5 TABLET ORAL
Refills: 0 | Status: DISCONTINUED | OUTPATIENT
Start: 2020-07-07 | End: 2020-07-08

## 2020-07-07 RX ORDER — NICOTINE POLACRILEX 2 MG
1 GUM BUCCAL DAILY
Refills: 0 | Status: DISCONTINUED | OUTPATIENT
Start: 2020-07-07 | End: 2020-07-08

## 2020-07-07 RX ADMIN — TIOTROPIUM BROMIDE 1 CAPSULE(S): 18 CAPSULE ORAL; RESPIRATORY (INHALATION) at 08:00

## 2020-07-07 RX ADMIN — Medication 1 PATCH: at 20:30

## 2020-07-07 RX ADMIN — QUETIAPINE FUMARATE 200 MILLIGRAM(S): 200 TABLET, FILM COATED ORAL at 22:59

## 2020-07-07 RX ADMIN — Medication 0.5 MILLIGRAM(S): at 09:39

## 2020-07-07 RX ADMIN — Medication 0.5 MILLIGRAM(S): at 23:01

## 2020-07-07 RX ADMIN — QUETIAPINE FUMARATE 100 MILLIGRAM(S): 200 TABLET, FILM COATED ORAL at 09:38

## 2020-07-07 RX ADMIN — ALBUTEROL 2 PUFF(S): 90 AEROSOL, METERED ORAL at 08:00

## 2020-07-07 RX ADMIN — ENOXAPARIN SODIUM 40 MILLIGRAM(S): 100 INJECTION SUBCUTANEOUS at 22:59

## 2020-07-07 RX ADMIN — ENOXAPARIN SODIUM 40 MILLIGRAM(S): 100 INJECTION SUBCUTANEOUS at 09:39

## 2020-07-07 RX ADMIN — CLOPIDOGREL BISULFATE 75 MILLIGRAM(S): 75 TABLET, FILM COATED ORAL at 09:38

## 2020-07-07 RX ADMIN — ALBUTEROL 2 PUFF(S): 90 AEROSOL, METERED ORAL at 16:00

## 2020-07-07 RX ADMIN — ALBUTEROL 2 PUFF(S): 90 AEROSOL, METERED ORAL at 12:03

## 2020-07-07 RX ADMIN — Medication 100 MILLIGRAM(S): at 09:38

## 2020-07-07 RX ADMIN — FAMOTIDINE 20 MILLIGRAM(S): 10 INJECTION INTRAVENOUS at 22:59

## 2020-07-07 RX ADMIN — BUDESONIDE AND FORMOTEROL FUMARATE DIHYDRATE 2 PUFF(S): 160; 4.5 AEROSOL RESPIRATORY (INHALATION) at 08:00

## 2020-07-07 RX ADMIN — Medication 60 MILLIGRAM(S): at 23:02

## 2020-07-07 RX ADMIN — Medication 81 MILLIGRAM(S): at 09:38

## 2020-07-07 RX ADMIN — Medication 25 MILLIGRAM(S): at 22:59

## 2020-07-07 RX ADMIN — Medication 60 MILLIGRAM(S): at 00:30

## 2020-07-07 RX ADMIN — GABAPENTIN 400 MILLIGRAM(S): 400 CAPSULE ORAL at 06:29

## 2020-07-07 RX ADMIN — Medication 60 MILLIGRAM(S): at 12:11

## 2020-07-07 RX ADMIN — Medication 25 MILLIGRAM(S): at 09:38

## 2020-07-07 RX ADMIN — SENNA PLUS 2 TABLET(S): 8.6 TABLET ORAL at 22:59

## 2020-07-07 RX ADMIN — GABAPENTIN 400 MILLIGRAM(S): 400 CAPSULE ORAL at 15:11

## 2020-07-07 RX ADMIN — FAMOTIDINE 20 MILLIGRAM(S): 10 INJECTION INTRAVENOUS at 09:38

## 2020-07-07 RX ADMIN — Medication 2 MILLIGRAM(S): at 23:00

## 2020-07-07 RX ADMIN — Medication 60 MILLIGRAM(S): at 05:33

## 2020-07-07 RX ADMIN — QUETIAPINE FUMARATE 100 MILLIGRAM(S): 200 TABLET, FILM COATED ORAL at 15:11

## 2020-07-07 RX ADMIN — Medication 5 MILLIGRAM(S): at 23:01

## 2020-07-07 RX ADMIN — Medication 1 PATCH: at 17:44

## 2020-07-07 RX ADMIN — Medication 60 MILLIGRAM(S): at 17:44

## 2020-07-07 RX ADMIN — GABAPENTIN 400 MILLIGRAM(S): 400 CAPSULE ORAL at 23:00

## 2020-07-07 NOTE — PROGRESS NOTE BEHAVIORAL HEALTH - NSBHCONSULTFOLLOWAFTERCARE_PSY_A_CORE FT
Patient to continue medication regimen as prescribed in hospital. Patient to benefit from out-patient psychiatric follow-up.
Patient to continue medication regimen as prescribed in hospital.

## 2020-07-07 NOTE — PROGRESS NOTE BEHAVIORAL HEALTH - NSBHFUPREASONCONS_PSY_A_CORE
agitation
med management/agitation
med management/agitation
agitation/med management
agitation
agitation/med management

## 2020-07-07 NOTE — PROGRESS NOTE BEHAVIORAL HEALTH - NSBHCONSULTMEDSEVERE_PSY_A_CORE FT
Haldol 2.5 mg IM (NOT Intravenous)

## 2020-07-07 NOTE — PROGRESS NOTE ADULT - ASSESSMENT
64 y/o M admitted on 4/18 with COPD exacerbation -- RRT called on 4/23 for AMS requiring intubation  extubated on 4/26 then re intubated on 4/29.    S/P Trach and PEG on 5/15 and has failed SBT.      Course complicated by MSSA sepsis and CRP in sputum-- treated with IV vanco and Cipro.    MYA 5/26 - NO evidence of vegetations  Blood cultures 5/23 & 5/29:  No growth  atrial fibrillation RVR - better controlled at present  urinary retention - resolved with Cardura  He has also suffered from encephalopathy and delirium  Periodic agitation and aggressive behavior requiring titration of sedatives and antipsychotic agents to control behavior.  Current trach is cuffless #6 fenestrated    at present, pt hemodynamics and respiratory function reasonable  trach remains capped.    OOB to chair and working with PT.  behavior is much improved  restlessness/pulling at devices are inhibiting his placement in appropriate rehab/NH setting    PMHx - chronic AFib w Watchman device, CHF, COPD, HTN, obesity     Plan at this time is for discharge to NH/rehab when appropriate,  in the meantime will transfer to med-surg floor.  Care should include:  HOB 30  TF's  TC - keep capped as tolerated - would NOT decannulate given this is pts 3rd trach.  Seroquel & Klonopin - titrate to control restlessness / agitation as possible  Cont ASA for CAD and AFib-- Diltiazem 60mg q6   Plavix    stopped (5/26) Apixiban - No need with Watchman device  chemical VTE prophylaxis Lovenox 40mg SC q12h based on BMI  Continue Cardura for urinary retention  Bowel regimen as appropriate  Supportive care     Attending Attestation:   30 minutes spent on total encounter; more than 50% of the visit was spent counseling and/or coordinating care by the attending physician.        case d/w Dr EDEN Davila of the hospitalist service on 7/6 who will assume care for the patient.  Discharge summary updated to reflect latest plan of care.

## 2020-07-07 NOTE — PROGRESS NOTE BEHAVIORAL HEALTH - NSBHFUPINTERVALHXFT_PSY_A_CORE
Patient presenting calm and cooperative; laying in bed; comfortable. Patient is sleeping today. Patient had a good night as per RN; with no STAT medications.
Patient presenting calm and cooperative; laying in bed; comfortable. Patient remains limited speech in the setting of being trach. Note. Patient is difficult to understand. Nonetheless, patient denying psychiatric symptoms including depressed mood or manic / psychotic symptoms. No delusions elicited. Patient has no agitation or aggression in last 24 hours; with no need for STAT medication. Patient has been tolerating Seroquel and Klonopin combination dosing: has managed mood dysregulation and anxiety with agitation. Tolerating medications with no side effects; none observed. Patient requesting to smoke: redirected to patch and no smoking on premises.
Patient presenting calm and cooperative; limited speech in the setting of being trach. Patient denying psychiatric symptoms including depressed mood or manic / psychotic symptoms. No delusions elicited. Patient however cannot respond to why he was agitated and aggressive with staff last night, leading to requiring STAT Haldol. Patient did not receive day time Seroquel however prior to evening of mood dysregulation. Patient receive Seroquel and Klonopin this morning. Patient is medication compliance. Patient is in good behavioral control today.
Patient presenting calm and cooperative; sitting in a chair; comfortable. Patient has limited speech in the setting of being trach. Patient is difficult to understand. Nonetheless, patient denying psychiatric symptoms including depressed mood or manic / psychotic symptoms. No delusions elicited. Patient has not been agitated and aggressive in the last 24 hours. Patient has been in good behavioral control. No STAT medication has been needed. Patient has been tolerating Seroquel and Klonopin combination dosing: has managed mood dysregulation and anxiety with agitation. Tolerating medications with no side effects; none observed.
Patient presenting calm and cooperative; laying in bed; comfortable. Patient is reactive today; smiling; pleasant. Patient is awake today; with no day-time sleepiness. Patient had a good night as per RN; with no STAT medications. Patient has been good all day today with no issues. Denies depressed mood or suicidal ideation. Denies manic / psychotic symptoms. No delusions elicited. Tolerating medications with no side effects; none observed.
Patient presenting calm and cooperative; laying in bed; comfortable. Patient is reactive today; smiling; pleasant. Patient is awake today; with no day-time sleepiness. Patient had a good night as per RN; with no STAT medications. Denies depressed mood or suicidal ideation. Denies manic / psychotic symptoms. No delusions elicited. Tolerating medications with no side effects; none observed.

## 2020-07-07 NOTE — PROGRESS NOTE BEHAVIORAL HEALTH - SUMMARY
65 year-old  male, admitted for CHF, COPD exacerbation and rapid A-fib.    Patient has behavioral disturbance in the setting of mood dysregulation which is likely of metabolic origin in the setting of medical comorbidities and post ventilation. Delirium is most likely. Patient to benefit from continual medication management. Patient however not in need for in-patient psychiatric hospitalization.    6.29.2020 - Patient is calm and cooperative today however was labile last night. Of note, patient did not receiving day time Seroquel and Klonopin yesterday. Patient medication compliant today: is in good behavioral control with no irritability or agitation. Patient denies depressed mood or manic / psychotic symptoms. Patient however not in need for in-patient psychiatric hospitalization.    6.30.2020 - Patient is calm and cooperative today; pleasant; in good behavioral control; with no mood lability / dysregulation. Patient has been medication compliant. Tolerating medications with no side effects; none observed. Patient has no irritability or agitation. Patient denies depressed mood or manic / psychotic symptoms. Patient is not in need for in-patient psychiatric hospitalization.    7.1.2020 - Patient is calm and cooperative today; pleasant; in good behavioral control; with no mood lability / dysregulation. Patient has been medication compliant. Tolerating medications with no side effects; none observed. Patient has no irritability or agitation. Patient denies depressed mood or manic / psychotic symptoms. Patient is not in need for in-patient psychiatric hospitalization.    7.3.2020 - Patient has been pleasant and in good behavioral control; with no mood lability / dysregulation. Patient has been medication compliant. Tolerating medications with no side effects; none observed. Patient has no irritability or agitation. Patient denies depressed mood or manic / psychotic symptoms. Patient is not in need for in-patient psychiatric hospitalization.    7.4.2020 -  Patient has been pleasant and in good behavioral control; with no mood lability / dysregulation. Patient has been medication compliant. Tolerating medications with no side effects; none observed. Patient has no irritability or agitation. Patient denies depressed mood or manic / psychotic symptoms. Patient is not in need for in-patient psychiatric hospitalization.    PLAN  1. Seroquel 100 mg at 8AM and 100 mg at 4PM and 200 mg at bedtime.  2. Klonopin 0.5 mg twice daily
65 year-old  male, admitted for CHF, COPD exacerbation and rapid A-fib.    Patient has behavioral disturbance in the setting of mood dysregulation which is likely of metabolic origin in the setting of medical comorbidities and post ventilation. Delirium is most likely. Patient to benefit from continual medication management. Patient however not in need for in-patient psychiatric hospitalization.    6.29.2020 - Patient is calm and cooperative today however was labile last night. Of note, patient did not receiving day time Seroquel and Klonopin yesterday. Patient medication compliant today: is in good behavioral control with no irritability or agitation. Patient denies depressed mood or manic / psychotic symptoms. Patient however not in need for in-patient psychiatric hospitalization.    6.30.2020 - Patient is calm and cooperative today; pleasant; in good behavioral control; with no mood lability / dysregulation. Patient has been medication compliant. Tolerating medications with no side effects; none observed. Patient has no irritability or agitation. Patient denies depressed mood or manic / psychotic symptoms. Patient is not in need for in-patient psychiatric hospitalization.    7.1.2020 - Patient is calm and cooperative today; pleasant; in good behavioral control; with no mood lability / dysregulation. Patient has been medication compliant. Tolerating medications with no side effects; none observed. Patient has no irritability or agitation. Patient denies depressed mood or manic / psychotic symptoms. Patient is not in need for in-patient psychiatric hospitalization.    7.3.2020 - Patient has been pleasant and in good behavioral control; with no mood lability / dysregulation. Patient has been medication compliant. Tolerating medications with no side effects; none observed. Patient has no irritability or agitation. Patient denies depressed mood or manic / psychotic symptoms. Patient is not in need for in-patient psychiatric hospitalization.    PLAN  1. Seroquel 100 mg at 8AM and 100 mg at 4PM and 200 mg at bedtime.  2. Klonopin 0.5 mg twice daily
65 year-old  male, admitted for CHF, COPD exacerbation and rapid A-fib.    Patient has behavioral disturbance in the setting of mood dysregulation which is likely of metabolic origin in the setting of medical comorbidities and post ventilation. Delirium is most likely. Patient to benefit from continual medication management. Patient however not in need for in-patient psychiatric hospitalization.    6.29.2020 - Patient is calm and cooperative today however was labile last night. Of note, patient did not receiving day time Seroquel and Klonopin yesterday. Patient medication compliant today: is in good behavioral control with no irritability or agitation. Patient denies depressed mood or manic / psychotic symptoms. Patient however not in need for in-patient psychiatric hospitalization.    6.30.2020 - Patient is calm and cooperative today; pleasant; in good behavioral control; with no mood lability / dysregulation. Patient has been medication compliant. Tolerating medications with no side effects; none observed. Patient has no irritability or agitation. Patient denies depressed mood or manic / psychotic symptoms. Patient is not in need for in-patient psychiatric hospitalization.    7.1.2020 - Patient is calm and cooperative today; pleasant; in good behavioral control; with no mood lability / dysregulation. Patient has been medication compliant. Tolerating medications with no side effects; none observed. Patient has no irritability or agitation. Patient denies depressed mood or manic / psychotic symptoms. Patient is not in need for in-patient psychiatric hospitalization.    7.3.2020 - Patient has been pleasant and in good behavioral control; with no mood lability / dysregulation. Patient has been medication compliant. Tolerating medications with no side effects; none observed. Patient has no irritability or agitation. Patient denies depressed mood or manic / psychotic symptoms. Patient is not in need for in-patient psychiatric hospitalization.    7.4.2020 -  Patient has been pleasant and in good behavioral control; with no mood lability / dysregulation. Patient has been medication compliant. Tolerating medications with no side effects; none observed. Patient has no irritability or agitation. Patient denies depressed mood or manic / psychotic symptoms. Patient is not in need for in-patient psychiatric hospitalization.    7.7.2020 -  Patient has been pleasant and in good behavioral control; with no mood lability / dysregulation. Patient has been medication compliant. Tolerating medications with no side effects; none observed. Patient has no irritability or agitation. Patient denies depressed mood or manic / psychotic symptoms. Note. Patient has seen improved of mood dysregulation with psychotropic management.  Patient is not in need for in-patient psychiatric hospitalization. Patient will benefit from out-patient psychiatric care.    PLAN  1. Seroquel 100 mg at 8AM and 100 mg at 4PM and 200 mg at bedtime.  2. Klonopin 0.5 mg twice daily
65 year-old  male, admitted for CHF, COPD exacerbation and rapid A-fib.    Patient has behavioral disturbance in the setting of mood dysregulation which is likely of metabolic origin in the setting of medical comorbidities and post ventilation. Delirium is most likely. Patient to benefit from continual medication management. Patient however not in need for in-patient psychiatric hospitalization.    6.29.2020 - Patient is calm and cooperative today however was labile last night. Of note, patient did not receiving day time Seroquel and Klonopin yesterday. Patient medication compliant today: is in good behavioral control with no irritability or agitation. Patient denies depressed mood or manic / psychotic symptoms. Patient however not in need for in-patient psychiatric hospitalization.    6.30.2020 - Patient is calm and cooperative today; pleasant; in good behavioral control; with no mood lability / dysregulation. Patient has been medication compliant. Tolerating medications with no side effects; none observed. Patient has no irritability or agitation. Patient denies depressed mood or manic / psychotic symptoms. Patient is not in need for in-patient psychiatric hospitalization.    7.1.2020 - Patient is calm and cooperative today; pleasant; in good behavioral control; with no mood lability / dysregulation. Patient has been medication compliant. Tolerating medications with no side effects; none observed. Patient has no irritability or agitation. Patient denies depressed mood or manic / psychotic symptoms. Patient is not in need for in-patient psychiatric hospitalization.      PLAN  1. Seroquel 50 mg at 8AM and 50 mg at 4PM and 200 mg at bedtime.  2. Klonopin 0.5 mg twice daily
65 year-old  male, admitted for CHF, COPD exacerbation and rapid A-fib.    Patient has behavioral disturbance in the setting of mood dysregulation which is likely of metabolic origin in the setting of medical comorbidities and post ventilation. Delirium is most likely. Patient to benefit from continual medication management. Patient however not in need for in-patient psychiatric hospitalization.    6.29.2020 - Patient is calm and cooperative today however was labile last night. Of note, patient did not receiving day time Seroquel and Klonopin yesterday. Patient medication compliant today: is in good behavioral control with no irritability or agitation. Patient denies depressed mood or manic / psychotic symptoms. Patient however not in need for in-patient psychiatric hospitalization.    PLAN  1. Seroquel 50 mg at 8AM and 50 mg at 4PM and 200 mg at bedtime.  2. Klonopin 0.5 mg twice daily
65 year-old  male, admitted for CHF, COPD exacerbation and rapid A-fib.    Patient has behavioral disturbance in the setting of mood dysregulation which is likely of metabolic origin in the setting of medical comorbidities and post ventilation. Delirium is most likely. Patient to benefit from continual medication management. Patient however not in need for in-patient psychiatric hospitalization.    6.29.2020 - Patient is calm and cooperative today however was labile last night. Of note, patient did not receiving day time Seroquel and Klonopin yesterday. Patient medication compliant today: is in good behavioral control with no irritability or agitation. Patient denies depressed mood or manic / psychotic symptoms. Patient however not in need for in-patient psychiatric hospitalization.    6.30.2020 - Patient is calm and cooperative today; pleasant; in good behavioral control; with no mood lability / dysregulation. Patient has been medication compliant. Tolerating medications with no side effects; none observed. Patient has no irritability or agitation. Patient denies depressed mood or manic / psychotic symptoms. Patient is not in need for in-patient psychiatric hospitalization.    PLAN  1. Seroquel 50 mg at 8AM and 50 mg at 4PM and 200 mg at bedtime.  2. Klonopin 0.5 mg twice daily

## 2020-07-07 NOTE — PROGRESS NOTE BEHAVIORAL HEALTH - NSBHCONSULTOBSREASON_PSY_A_CORE FT
no acute risk for harm to self / others on unit

## 2020-07-07 NOTE — PROGRESS NOTE BEHAVIORAL HEALTH - NSBHCONSULTMEDS_PSY_A_CORE FT
PLAN  1. Seroquel 100 mg at 8AM and 100 mg at 4PM and 200 mg at bedtime.  2. Klonopin 0.5 mg twice daily
PLAN  1. Seroquel 50 mg at 8AM and 50 mg at 4PM and 200 mg at bedtime.  2. Klonopin 0.5 mg twice daily

## 2020-07-07 NOTE — PROGRESS NOTE ADULT - SUBJECTIVE AND OBJECTIVE BOX
66 y/o M admitted on 4/18 with COPD exacerbation -- RRT called on 4/23 for AMS requiring intubation  extubated on 4/26 then re intubated on 4/29.    S/P Trach and PEG on 5/15 and has failed SBT.      Course complicated by MSSA sepsis and CRP in sputum-- treated with IV vanco and Cipro.    MYA 5/26 - NO evidence of vegetations  He has also suffered from encephalopathy and delirium  Periodic agitation and aggressive behavior requiring titration of sedatives and antipsychotic agents to control behavior.  6/8: Trach changed today by Thoracic sx team.     Events of last 7 days discussed with Dr Crews who has been caring for the patient.    7/6: Hemodynamics and respiratory function reasonable - pt trach remains capped.  He is OOB to chair and working with PT.  Patient behavior is much improved on his current medication regimen and he is awaiting placement in rehab.    7/7: doing well overall - behavior remains reasonable and cooperative.  He is awaiting placement in rehab.  Hemodynamics and respiratory function reasonable - pt trach remains capped.  He is OOB to chair and working with PT.      PMHx - chronic AFib w Watchman device, CHF, COPD, HTN, obesity       Allergies    Ceclor (Rash)  Morenaf (Rash)      ICU Vital Signs Last 24 Hrs  T(C): 37.2 (07 Jul 2020 06:00), Max: 37.2 (07 Jul 2020 06:00)  T(F): 99 (07 Jul 2020 06:00), Max: 99 (07 Jul 2020 06:00)  HR: 95 (07 Jul 2020 08:01) (79 - 112)  BP: 126/66 (07 Jul 2020 08:00) (96/59 - 138/73)  BP(mean): 79 (07 Jul 2020 08:00) (61 - 91)  RR: 30 (07 Jul 2020 08:00) (20 - 32)  SpO2: 95% (07 Jul 2020 08:01) (92% - 97%)          I&O's Summary    06 Jul 2020 07:01  -  07 Jul 2020 07:00  --------------------------------------------------------  IN: 2800 mL / OUT: 1900 mL / NET: 900 mL                              10.4   6.23  )-----------( 213      ( 07 Jul 2020 06:40 )             32.0       07-07    139  |  104  |  14  ----------------------------<  117<H>  4.1   |  31  |  0.60    Ca    8.8      07 Jul 2020 06:40  Phos  4.3     07-07  Mg     1.9     07-07        MEDICATIONS  (STANDING):  ALBUTerol    90 MICROgram(s) HFA Inhaler 2 Puff(s) Inhalation every 4 hours  aspirin  chewable 81 milliGRAM(s) Oral daily  budesonide 160 MICROgram(s)/formoterol 4.5 MICROgram(s) Inhaler 2 Puff(s) Inhalation two times a day  clonazePAM  Tablet 0.5 milliGRAM(s) Oral two times a day  clopidogrel Tablet 75 milliGRAM(s) Oral daily  diltiazem    Tablet 60 milliGRAM(s) Oral every 6 hours  doxazosin 2 milliGRAM(s) Oral at bedtime  enoxaparin Injectable 40 milliGRAM(s) SubCutaneous every 12 hours  famotidine    Tablet 20 milliGRAM(s) Oral two times a day  gabapentin   Solution 400 milliGRAM(s) Enteral Tube three times a day  melatonin 5 milliGRAM(s) Oral at bedtime  metoprolol tartrate 25 milliGRAM(s) Oral two times a day  polyethylene glycol 3350 17 Gram(s) Oral daily  QUEtiapine 200 milliGRAM(s) Oral at bedtime  QUEtiapine 100 milliGRAM(s) Oral <User Schedule>  senna 2 Tablet(s) Oral at bedtime  thiamine 100 milliGRAM(s) Oral daily  tiotropium 18 MICROgram(s) Capsule 1 Capsule(s) Inhalation daily    MEDICATIONS  (PRN):  acetaminophen   Tablet .. 650 milliGRAM(s) Oral every 6 hours PRN Temp greater or equal to 38.5C (101.3F)  ibuprofen  Tablet. 400 milliGRAM(s) Oral every 6 hours PRN Moderate Pain (4 - 6)        Review of Systems:   · Additional ROS	offers no specific complaints; poor historian	    Physical Exam:   · Constitutional	detailed exam	  · Constitutional Details	no distress	  · Eyes	detailed exam	  · Eyes Details	PERRL; EOMI	  · ENMT	detailed exam	  · ENMT Details	mouth	  · Mouth	moist	  · Neck	detailed exam	  · Neck Details	supple; no JVD; trach in situ; capped	  · Back	detailed exam	  · Back Details	normal shape	  · Respiratory	detailed exam	  · Respiratory Details	airway patent; breath sounds equal; good air movement; respirations non-labored	  · Cardiovascular	detailed exam	  · Cardiovascular Details	irregular rate and rhythm	  · Gastrointestinal	detailed exam	  · GI Normal	soft; nontender; no distention; bowel sounds normal	  · Gastrointestinal Comments	PEG in situ	  · Extremities	detailed exam	  · Extremities Details	no cyanosis	  · Extremities Comments	LEFT BKA	  · Vascular	Equal and normal pulses (carotid, femoral, dorsalis pedis)	  · Neurological	detailed exam	  · Mental Status	AA and interactive	  · Motor	MARIE spont - no gross deficits	  · Sensory	Grossly intact	  · Skin	detailed exam	  · Skin Details	warm and dry	  · Musculoskeletal	detailed exam	  · Musculoskeletal Details	normal strength	  · Psychiatric	detailed exam	  · Psychiatric Comments	variable affect/behavior	      DVT Prophylaxis: Lovenox q12h, venodynes    Visit Information:  [facility]

## 2020-07-07 NOTE — PROGRESS NOTE BEHAVIORAL HEALTH - NSBHPTASSESSDT_PSY_A_CORE
01-Jul-2020 10:53
29-Jun-2020 15:24
30-Jun-2020 11:08
07-Jul-2020 15:36
03-Jul-2020 13:01
04-Jul-2020 11:52

## 2020-07-07 NOTE — PROGRESS NOTE ADULT - SUBJECTIVE AND OBJECTIVE BOX
Subjective:    pat no new events still on RA with capped trach with minimal secreations.    Home Medications:  acetaminophen 325 mg oral tablet: 2 tab(s) orally every 6 hours, As needed, Temp greater or equal to 38.5C (101.3F) (02 Jun 2020 12:53)  albuterol 90 mcg/inh inhalation aerosol: 2 puff(s) inhaled every 4 hours (02 Jun 2020 12:53)  ALPRAZolam 0.5 mg oral tablet: 1 tab(s) orally every 6 hours (02 Jun 2020 12:53)  aspirin 81 mg oral tablet, chewable: 1 tab(s) orally once a day (02 Jun 2020 12:53)  clopidogrel 75 mg oral tablet: 1 tab(s) orally once a day (02 Jun 2020 12:53)  dilTIAZem 90 mg oral tablet: 1 tab(s) orally every 6 hours (02 Jun 2020 12:53)  doxazosin 2 mg oral tablet: 1 tab(s) orally once a day (at bedtime) (02 Jun 2020 12:53)  gabapentin 400 mg oral capsule: 1 cap(s) orally 3 times a day (02 Jun 2020 12:53)  pantoprazole 40 mg oral granule, enteric coated: 40 milligram(s) orally once a day (19 Apr 2020 03:12)  QUEtiapine 100 mg oral tablet: 1 tab(s) orally once a day (at bedtime) (02 Jun 2020 12:53)  QUEtiapine 50 mg oral tablet: 1 tab(s) orally once a day (02 Jun 2020 12:53)  Spiriva 18 mcg inhalation capsule: 1 cap(s) inhaled once a day (19 Apr 2020 03:12)  thiamine 100 mg oral tablet: 1 tab(s) orally once a day (02 Jun 2020 12:53)    MEDICATIONS  (STANDING):  ALBUTerol    90 MICROgram(s) HFA Inhaler 2 Puff(s) Inhalation every 4 hours  aspirin  chewable 81 milliGRAM(s) Oral daily  budesonide 160 MICROgram(s)/formoterol 4.5 MICROgram(s) Inhaler 2 Puff(s) Inhalation two times a day  clonazePAM  Tablet 0.5 milliGRAM(s) Oral two times a day  clopidogrel Tablet 75 milliGRAM(s) Oral daily  diltiazem    Tablet 60 milliGRAM(s) Oral every 6 hours  doxazosin 2 milliGRAM(s) Oral at bedtime  enoxaparin Injectable 40 milliGRAM(s) SubCutaneous every 12 hours  famotidine    Tablet 20 milliGRAM(s) Oral two times a day  gabapentin   Solution 400 milliGRAM(s) Enteral Tube three times a day  melatonin 5 milliGRAM(s) Oral at bedtime  metoprolol tartrate 25 milliGRAM(s) Oral two times a day  polyethylene glycol 3350 17 Gram(s) Oral daily  QUEtiapine 200 milliGRAM(s) Oral at bedtime  QUEtiapine 100 milliGRAM(s) Oral <User Schedule>  senna 2 Tablet(s) Oral at bedtime  thiamine 100 milliGRAM(s) Oral daily  tiotropium 18 MICROgram(s) Capsule 1 Capsule(s) Inhalation daily    MEDICATIONS  (PRN):  acetaminophen   Tablet .. 650 milliGRAM(s) Oral every 6 hours PRN Temp greater or equal to 38.5C (101.3F)  ibuprofen  Tablet. 400 milliGRAM(s) Oral every 6 hours PRN Moderate Pain (4 - 6)      Allergies    Ceclor (Rash)  Duricef (Rash)    Intolerances        Vital Signs Last 24 Hrs  T(C): 37.2 (07 Jul 2020 09:00), Max: 37.2 (07 Jul 2020 06:00)  T(F): 98.9 (07 Jul 2020 09:00), Max: 99 (07 Jul 2020 06:00)  HR: 88 (07 Jul 2020 13:00) (79 - 108)  BP: 109/57 (07 Jul 2020 13:00) (96/59 - 138/73)  BP(mean): 69 (07 Jul 2020 13:00) (65 - 91)  RR: 28 (07 Jul 2020 13:00) (20 - 32)  SpO2: 94% (07 Jul 2020 13:00) (91% - 97%)      PHYSICAL EXAMINATION:    NECK:  Supple. No lymphadenopathy. Jugular venous pressure not elevated. Carotids equal.   HEART:   The cardiac impulse has a normal quality. Reg., Nl S1 and S2.  There are no murmurs, rubs or gallops noted  CHEST:  Chest rhonchi to auscultation. Normal respiratory effort.  ABDOMEN:  Soft and nontender.   EXTREMITIES:  There is no edema.       LABS:                        10.4   6.23  )-----------( 213      ( 07 Jul 2020 06:40 )             32.0     07-07    139  |  104  |  14  ----------------------------<  117<H>  4.1   |  31  |  0.60    Ca    8.8      07 Jul 2020 06:40  Phos  4.3     07-07  Mg     1.9     07-07

## 2020-07-07 NOTE — PROGRESS NOTE BEHAVIORAL HEALTH - NSBHATTESTSEENBY_PSY_A_CORE
NP with telephonic supervision from Attending Psychiatrist

## 2020-07-07 NOTE — PROGRESS NOTE BEHAVIORAL HEALTH - RISK ASSESSMENT
LOW RISK     ACUTE RISK FACTORS: mood dysregulation; agitation    CHRONIC RISK FACTORS: acute medical comorbidities     PROTECTIVE FACTORS: no suicidal ideation/intent/plan, no assault ideation/intent/plan or homicidal ideation/intent/plan, stable residence.    Patient symptoms not indicating imminent risk for harm to self; not warranting involuntary in-patient hospitalization

## 2020-07-07 NOTE — PROGRESS NOTE BEHAVIORAL HEALTH - THOUGHT CONTENT
Unremarkable/Other
Other/Unremarkable
Other/Unremarkable
Unremarkable/Other
Unremarkable/Other
Other/Unremarkable

## 2020-07-07 NOTE — PROGRESS NOTE BEHAVIORAL HEALTH - NSBHCHARTREVIEWVS_PSY_A_CORE FT
Vital Signs Last 24 Hrs  T(C): 36.1 (29 Jun 2020 05:00), Max: 36.6 (28 Jun 2020 18:50)  T(F): 96.9 (29 Jun 2020 05:00), Max: 97.8 (28 Jun 2020 18:50)  HR: 84 (29 Jun 2020 12:17) (79 - 104)  BP: 130/66 (29 Jun 2020 10:00) (93/61 - 130/66)  BP(mean): 83 (29 Jun 2020 10:00) (67 - 114)  RR: 24 (29 Jun 2020 10:00) (20 - 34)  SpO2: 98% (29 Jun 2020 12:17) (88% - 99%)
Vital Signs Last 24 Hrs  T(C): 36.1 (29 Jun 2020 05:00), Max: 36.6 (28 Jun 2020 18:50)  T(F): 96.9 (29 Jun 2020 05:00), Max: 97.8 (28 Jun 2020 18:50)  HR: 84 (29 Jun 2020 12:17) (79 - 104)  BP: 130/66 (29 Jun 2020 10:00) (93/61 - 130/66)  BP(mean): 83 (29 Jun 2020 10:00) (67 - 114)  RR: 24 (29 Jun 2020 10:00) (20 - 34)  SpO2: 98% (29 Jun 2020 12:17) (88% - 99%)
Vital Signs Last 24 Hrs  T(C): 36.7 (01 Jul 2020 10:00), Max: 37.7 (30 Jun 2020 22:00)  T(F): 98.1 (01 Jul 2020 10:00), Max: 99.9 (30 Jun 2020 22:00)  HR: 86 (01 Jul 2020 10:00) (70 - 119)  BP: 106/55 (01 Jul 2020 10:00) (11/62 - 132/72)  BP(mean): 67 (01 Jul 2020 10:00) (60 - 88)  RR: 28 (01 Jul 2020 10:00) (17 - 34)  SpO2: 88% (01 Jul 2020 10:00) (88% - 99%)
Vital Signs Last 24 Hrs  T(C): 37.2 (07 Jul 2020 09:00), Max: 37.2 (07 Jul 2020 06:00)  T(F): 98.9 (07 Jul 2020 09:00), Max: 99 (07 Jul 2020 06:00)  HR: 84 (07 Jul 2020 15:00) (79 - 108)  BP: 113/62 (07 Jul 2020 15:00) (91/60 - 138/73)  BP(mean): 74 (07 Jul 2020 15:00) (65 - 91)  RR: 29 (07 Jul 2020 15:00) (20 - 32)  SpO2: 96% (07 Jul 2020 14:00) (91% - 97%)
Vital Signs Last 24 Hrs  T(C): 36.7 (01 Jul 2020 10:00), Max: 37.7 (30 Jun 2020 22:00)  T(F): 98.1 (01 Jul 2020 10:00), Max: 99.9 (30 Jun 2020 22:00)  HR: 86 (01 Jul 2020 10:00) (70 - 119)  BP: 106/55 (01 Jul 2020 10:00) (11/62 - 132/72)  BP(mean): 67 (01 Jul 2020 10:00) (60 - 88)  RR: 28 (01 Jul 2020 10:00) (17 - 34)  SpO2: 88% (01 Jul 2020 10:00) (88% - 99%)
Vital Signs Last 24 Hrs  T(C): 36.6 (04 Jul 2020 06:00), Max: 37 (03 Jul 2020 15:00)  T(F): 97.8 (04 Jul 2020 06:00), Max: 98.6 (03 Jul 2020 15:00)  HR: 68 (04 Jul 2020 11:00) (67 - 94)  BP: 94/47 (04 Jul 2020 11:00) (94/47 - 117/68)  BP(mean): 56 (04 Jul 2020 11:00) (56 - 79)  RR: 26 (04 Jul 2020 11:00) (22 - 32)  SpO2: 100% (04 Jul 2020 11:00) (89% - 100%)

## 2020-07-07 NOTE — PROGRESS NOTE BEHAVIORAL HEALTH - NS ED [facility] REVIEW OF ED CHART AVAILABLE IMAGING REVIEWED
Message routed to Hortensia Chandler NP, to review and advise.    Cannot find Aluminum to order.  Can only find \"metal\" .  Should this be ordered?  
None available

## 2020-07-07 NOTE — PROGRESS NOTE ADULT - SUBJECTIVE AND OBJECTIVE BOX
Subjective:  No acute events overnight.    Vital Signs:  Vital Signs Last 24 Hrs  T(C): 37.2 (07-07-20 @ 06:00), Max: 37.2 (07-07-20 @ 06:00)  T(F): 99 (07-07-20 @ 06:00), Max: 99 (07-07-20 @ 06:00)  HR: 95 (07-07-20 @ 08:01) (79 - 112)  BP: 105/54 (07-07-20 @ 06:00) (96/59 - 138/73)  RR: 23 (07-07-20 @ 06:00) (20 - 32)  SpO2: 95% (07-07-20 @ 08:01) (92% - 97%) on room air    Telemetry/Alarms: atrial fibrillation    Relevant labs, radiology and Medications reviewed                        10.4   6.23  )-----------( 213      ( 07 Jul 2020 06:40 )             32.0     07-07    139  |  104  |  14  ----------------------------<  117<H>  4.1   |  31  |  0.60    Ca    8.8      07 Jul 2020 06:40  Phos  4.3     07-07  Mg     1.9     07-07        MEDICATIONS  (STANDING):  ALBUTerol    90 MICROgram(s) HFA Inhaler 2 Puff(s) Inhalation every 4 hours  aspirin  chewable 81 milliGRAM(s) Oral daily  budesonide 160 MICROgram(s)/formoterol 4.5 MICROgram(s) Inhaler 2 Puff(s) Inhalation two times a day  clopidogrel Tablet 75 milliGRAM(s) Oral daily  diltiazem    Tablet 60 milliGRAM(s) Oral every 6 hours  doxazosin 2 milliGRAM(s) Oral at bedtime  enoxaparin Injectable 40 milliGRAM(s) SubCutaneous every 12 hours  famotidine    Tablet 20 milliGRAM(s) Oral two times a day  gabapentin   Solution 400 milliGRAM(s) Enteral Tube three times a day  melatonin 5 milliGRAM(s) Oral at bedtime  metoprolol tartrate 25 milliGRAM(s) Oral two times a day  polyethylene glycol 3350 17 Gram(s) Oral daily  QUEtiapine 200 milliGRAM(s) Oral at bedtime  QUEtiapine 100 milliGRAM(s) Oral <User Schedule>  senna 2 Tablet(s) Oral at bedtime  thiamine 100 milliGRAM(s) Oral daily  tiotropium 18 MICROgram(s) Capsule 1 Capsule(s) Inhalation daily    MEDICATIONS  (PRN):  acetaminophen   Tablet .. 650 milliGRAM(s) Oral every 6 hours PRN Temp greater or equal to 38.5C (101.3F)  ibuprofen  Tablet. 400 milliGRAM(s) Oral every 6 hours PRN Moderate Pain (4 - 6)      Physical exam  Gen: NAD breathing comfortably  Neuro: AO X 3   Card: S1 S2  Pulm: equal bilaterally  Abd: Soft PEG in place  Ext: no edema      I&O's Summary    06 Jul 2020 07:01  -  07 Jul 2020 07:00  --------------------------------------------------------  IN: 2800 mL / OUT: 1900 mL / NET: 900 mL        Assessment  65y Male  w/ PAST MEDICAL & SURGICAL HISTORY:  Chronic CHF  COPD without exacerbation  Atrial fibrillation  Presence of Watchman left atrial appendage closure device  Hypertension  GERD (gastroesophageal reflux disease)  Chronic obstructive pulmonary disease (COPD)  Anemia  Falls  Meningitis  Collapsed lung  Alcohol withdrawal  Emphysema of lung  Cirrhosis  CHF (congestive heart failure)  Poor historian  Alcohol abuse  Chronic atrial fibrillation  Unilateral amputation of lower extremity below knee  Presence of Watchman left atrial appendage closure device  S/P BKA (below knee amputation) unilateral, left    Patient is a 65y old  Male who presents with a chief complaint of acute hypoxemic, hypercapneic resp failure  COPD exacerbation (06 Jul 2020 13:23)    1.  Chronic respiratory failure  2.  COPD exacerbation  3.  Pneumonia  4.  Leukocytosis and fevers - now resolved  5.  Agitation and confusion    Continue capping trach as tolerated.  Continue diet.  Would not decannulate this hospital admission.  May follow up as outpatient with Dr. Morris for trach removal.  Discharge planning.    Discussed with Cardiothoracic Team at AM rounds.

## 2020-07-07 NOTE — PROGRESS NOTE ADULT - ASSESSMENT
PROBLEMS;    Febrile illness- GNR in sputum-pseudomonas/proteus-off abx  s/p staph bactermia/sputum pseudomonas/staph  Ac on chronic hypercapnic & hypoxamic respiratory failure-s/p trach & PEG  sputum-Few Pseudomonas aeruginosa (Carbapenem Resistant)/Moderate Methicillin resistant Staphylococcus aureus  afib with RVR  [facility]/VIJAY  AC flare of COPD/chronic vs acute on chronic bronchitis  Mild interstitial lung disease-NSIP h/o smoking  COVID negativex2  Pulmonary hypertension  Nonobstructing stone in the left ureterovesicular junction/ nonobstructing stone in the lower pole right kidney.  Subacute hemorrhage in the right rectus abdominis along the anterior sheath, measures 1.6 cm in thickness and extends from the umbilicus to the inferior myotendinous junction  Cirrhosis  Mild splenomegaly-portal venous hypertension.  Chronic afib w Watchman device  CHF  BPH  GERD  ETOH abuse  seizures disorder    PLAN;    pulmonary stable-decd planning  speach & swallow eval  Trach suction PRN  Tube feeding as tolerated  supportive care  covid repeat testing-neg  Eliquis/asa 81  d/w staff

## 2020-07-07 NOTE — PROGRESS NOTE BEHAVIORAL HEALTH - NSBHFUPINTERVALCCFT_PSY_A_CORE
I want to go home

## 2020-07-07 NOTE — PROGRESS NOTE BEHAVIORAL HEALTH - NSBHCHARTREVIEWLAB_PSY_A_CORE FT
10.1   4.82  )-----------( 253      ( 26 Jun 2020 06:51 )             31.4       06-26    140  |  105  |  19  ----------------------------<  117<H>  3.7   |  32<H>  |  0.59    Ca    9.0      26 Jun 2020 06:51                        Lactate Trend            CAPILLARY BLOOD GLUCOSE            Culture Results:   No growth (06-16 @ 08:50)  Culture Results:   Moderate Pseudomonas aeruginosa (Carbapenem Resistant)  Moderate Proteus mirabilis  Normal Respiratory Faiht absent (06-15 @ 14:15)  Culture Results:   No Growth Final (06-15 @ 13:29)  Culture Results:   No Growth Final (06-15 @ 13:28)  Culture Results:   No Growth Final (05-29 @ 06:49)  Culture Results:   No Growth Final (05-29 @ 06:45)
10.1   4.82  )-----------( 253      ( 26 Jun 2020 06:51 )             31.4       06-26    140  |  105  |  19  ----------------------------<  117<H>  3.7   |  32<H>  |  0.59    Ca    9.0      26 Jun 2020 06:51                        Lactate Trend            CAPILLARY BLOOD GLUCOSE            Culture Results:   No growth (06-16 @ 08:50)  Culture Results:   Moderate Pseudomonas aeruginosa (Carbapenem Resistant)  Moderate Proteus mirabilis  Normal Respiratory Faith absent (06-15 @ 14:15)  Culture Results:   No Growth Final (06-15 @ 13:29)  Culture Results:   No Growth Final (06-15 @ 13:28)  Culture Results:   No Growth Final (05-29 @ 06:49)  Culture Results:   No Growth Final (05-29 @ 06:45)

## 2020-07-08 VITALS
TEMPERATURE: 99 F | HEART RATE: 102 BPM | OXYGEN SATURATION: 96 % | RESPIRATION RATE: 18 BRPM | SYSTOLIC BLOOD PRESSURE: 129 MMHG | DIASTOLIC BLOOD PRESSURE: 70 MMHG

## 2020-07-08 PROCEDURE — 99232 SBSQ HOSP IP/OBS MODERATE 35: CPT

## 2020-07-08 RX ORDER — BUDESONIDE AND FORMOTEROL FUMARATE DIHYDRATE 160; 4.5 UG/1; UG/1
2 AEROSOL RESPIRATORY (INHALATION)
Qty: 0 | Refills: 0 | DISCHARGE
Start: 2020-07-08

## 2020-07-08 RX ORDER — SENNA PLUS 8.6 MG/1
2 TABLET ORAL
Qty: 0 | Refills: 0 | DISCHARGE
Start: 2020-07-08

## 2020-07-08 RX ORDER — POLYETHYLENE GLYCOL 3350 17 G/17G
17 POWDER, FOR SOLUTION ORAL
Qty: 0 | Refills: 0 | DISCHARGE
Start: 2020-07-08

## 2020-07-08 RX ORDER — METOPROLOL TARTRATE 50 MG
1 TABLET ORAL
Qty: 0 | Refills: 0 | DISCHARGE
Start: 2020-07-08

## 2020-07-08 RX ORDER — DILTIAZEM HCL 120 MG
1 CAPSULE, EXT RELEASE 24 HR ORAL
Qty: 0 | Refills: 0 | DISCHARGE
Start: 2020-07-08

## 2020-07-08 RX ORDER — FAMOTIDINE 10 MG/ML
1 INJECTION INTRAVENOUS
Qty: 0 | Refills: 0 | DISCHARGE
Start: 2020-07-08

## 2020-07-08 RX ORDER — NICOTINE POLACRILEX 2 MG
14 GUM BUCCAL
Qty: 0 | Refills: 0 | DISCHARGE
Start: 2020-07-08

## 2020-07-08 RX ADMIN — Medication 60 MILLIGRAM(S): at 05:53

## 2020-07-08 RX ADMIN — Medication 25 MILLIGRAM(S): at 10:22

## 2020-07-08 RX ADMIN — QUETIAPINE FUMARATE 100 MILLIGRAM(S): 200 TABLET, FILM COATED ORAL at 15:11

## 2020-07-08 RX ADMIN — ALBUTEROL 2 PUFF(S): 90 AEROSOL, METERED ORAL at 05:32

## 2020-07-08 RX ADMIN — Medication 100 MILLIGRAM(S): at 10:29

## 2020-07-08 RX ADMIN — Medication 60 MILLIGRAM(S): at 12:33

## 2020-07-08 RX ADMIN — ALBUTEROL 2 PUFF(S): 90 AEROSOL, METERED ORAL at 08:49

## 2020-07-08 RX ADMIN — POLYETHYLENE GLYCOL 3350 17 GRAM(S): 17 POWDER, FOR SOLUTION ORAL at 10:23

## 2020-07-08 RX ADMIN — TIOTROPIUM BROMIDE 1 CAPSULE(S): 18 CAPSULE ORAL; RESPIRATORY (INHALATION) at 08:50

## 2020-07-08 RX ADMIN — GABAPENTIN 400 MILLIGRAM(S): 400 CAPSULE ORAL at 05:53

## 2020-07-08 RX ADMIN — Medication 81 MILLIGRAM(S): at 10:22

## 2020-07-08 RX ADMIN — FAMOTIDINE 20 MILLIGRAM(S): 10 INJECTION INTRAVENOUS at 10:22

## 2020-07-08 RX ADMIN — QUETIAPINE FUMARATE 100 MILLIGRAM(S): 200 TABLET, FILM COATED ORAL at 10:21

## 2020-07-08 RX ADMIN — ALBUTEROL 2 PUFF(S): 90 AEROSOL, METERED ORAL at 12:18

## 2020-07-08 RX ADMIN — GABAPENTIN 400 MILLIGRAM(S): 400 CAPSULE ORAL at 15:08

## 2020-07-08 RX ADMIN — CLOPIDOGREL BISULFATE 75 MILLIGRAM(S): 75 TABLET, FILM COATED ORAL at 10:22

## 2020-07-08 RX ADMIN — ENOXAPARIN SODIUM 40 MILLIGRAM(S): 100 INJECTION SUBCUTANEOUS at 10:23

## 2020-07-08 RX ADMIN — Medication 400 MILLIGRAM(S): at 15:10

## 2020-07-08 RX ADMIN — BUDESONIDE AND FORMOTEROL FUMARATE DIHYDRATE 2 PUFF(S): 160; 4.5 AEROSOL RESPIRATORY (INHALATION) at 08:50

## 2020-07-08 NOTE — PROGRESS NOTE ADULT - SUBJECTIVE AND OBJECTIVE BOX
CC:  Patient is a 65y old  Male who presents with a chief complaint of acute hypoxemic, hypercapneic resp failure  COPD exacerbation (08 Jul 2020 09:54)    SUBJECTIVE:     -no new complaints or issues at current time.    ROS:  all other review of systems are negative unless indicated above.    acetaminophen   Tablet .. 650 milliGRAM(s) Oral every 6 hours PRN  ALBUTerol    90 MICROgram(s) HFA Inhaler 2 Puff(s) Inhalation every 4 hours  aspirin  chewable 81 milliGRAM(s) Oral daily  budesonide 160 MICROgram(s)/formoterol 4.5 MICROgram(s) Inhaler 2 Puff(s) Inhalation two times a day  clonazePAM  Tablet 0.5 milliGRAM(s) Oral two times a day  clopidogrel Tablet 75 milliGRAM(s) Oral daily  diltiazem    Tablet 60 milliGRAM(s) Oral every 6 hours  doxazosin 2 milliGRAM(s) Oral at bedtime  enoxaparin Injectable 40 milliGRAM(s) SubCutaneous every 12 hours  famotidine    Tablet 20 milliGRAM(s) Oral two times a day  gabapentin   Solution 400 milliGRAM(s) Enteral Tube three times a day  ibuprofen  Tablet. 400 milliGRAM(s) Oral every 6 hours PRN  melatonin 5 milliGRAM(s) Oral at bedtime  metoprolol tartrate 25 milliGRAM(s) Oral two times a day  nicotine -  14 mG/24Hr(s) Patch 1 patch Transdermal daily  polyethylene glycol 3350 17 Gram(s) Oral daily  QUEtiapine 200 milliGRAM(s) Oral at bedtime  QUEtiapine 100 milliGRAM(s) Oral <User Schedule>  senna 2 Tablet(s) Oral at bedtime  thiamine 100 milliGRAM(s) Oral daily  tiotropium 18 MICROgram(s) Capsule 1 Capsule(s) Inhalation daily    T(C): 37.4 (07-08-20 @ 05:00), Max: 37.9 (07-07-20 @ 21:08)  HR: 89 (07-08-20 @ 08:54) (83 - 109)  BP: 121/66 (07-08-20 @ 05:00) (91/60 - 134/116)  RR: 18 (07-08-20 @ 05:00) (18 - 31)  SpO2: 94% (07-08-20 @ 08:54) (91% - 97%)    Constitutional: NAD.   HEENT: PERRL, EOMI, MMM.  Neck: Soft and supple, No carotid bruit, No JVD  Respiratory: Breath sounds are clear bilaterally, No wheezing, rales or rhonchi  Cardiovascular: S1 and S2, regular rate and rhythm, no murmur, rub or gallop.  Gastrointestinal: Bowel Sounds present, soft, nontender, nondistended, no guarding, no rebound, no mass.  Extremities: No peripheral edema  Vascular: 2+ peripheral pulses  Neurological: A/O x , no focal deficits  Musculoskeletal: 5/5 strength b/l upper and lower extremities  Skin:  no visible rashes.                         10.4   6.23  )-----------( 213      ( 07 Jul 2020 06:40 )             32.0       07-07    139  |  104  |  14  ----------------------------<  117<H>  4.1   |  31  |  0.60    Ca    8.8      07 Jul 2020 06:40  Phos  4.3     07-07  Mg     1.9     07-07 CC:  Patient is a 65y old  Male who presents with a chief complaint of acute hypoxemic, hypercapneic resp failure  COPD exacerbation (08 Jul 2020 09:54)    SUBJECTIVE:     -no new complaints or issues at current time.    ROS:  all other review of systems are negative unless indicated above.    acetaminophen   Tablet .. 650 milliGRAM(s) Oral every 6 hours PRN  ALBUTerol    90 MICROgram(s) HFA Inhaler 2 Puff(s) Inhalation every 4 hours  aspirin  chewable 81 milliGRAM(s) Oral daily  budesonide 160 MICROgram(s)/formoterol 4.5 MICROgram(s) Inhaler 2 Puff(s) Inhalation two times a day  clonazePAM  Tablet 0.5 milliGRAM(s) Oral two times a day  clopidogrel Tablet 75 milliGRAM(s) Oral daily  diltiazem    Tablet 60 milliGRAM(s) Oral every 6 hours  doxazosin 2 milliGRAM(s) Oral at bedtime  enoxaparin Injectable 40 milliGRAM(s) SubCutaneous every 12 hours  famotidine    Tablet 20 milliGRAM(s) Oral two times a day  gabapentin   Solution 400 milliGRAM(s) Enteral Tube three times a day  ibuprofen  Tablet. 400 milliGRAM(s) Oral every 6 hours PRN  melatonin 5 milliGRAM(s) Oral at bedtime  metoprolol tartrate 25 milliGRAM(s) Oral two times a day  nicotine -  14 mG/24Hr(s) Patch 1 patch Transdermal daily  polyethylene glycol 3350 17 Gram(s) Oral daily  QUEtiapine 200 milliGRAM(s) Oral at bedtime  QUEtiapine 100 milliGRAM(s) Oral <User Schedule>  senna 2 Tablet(s) Oral at bedtime  thiamine 100 milliGRAM(s) Oral daily  tiotropium 18 MICROgram(s) Capsule 1 Capsule(s) Inhalation daily    T(C): 37.4 (07-08-20 @ 05:00), Max: 37.9 (07-07-20 @ 21:08)  HR: 89 (07-08-20 @ 08:54) (83 - 109)  BP: 121/66 (07-08-20 @ 05:00) (91/60 - 134/116)  RR: 18 (07-08-20 @ 05:00) (18 - 31)  SpO2: 94% (07-08-20 @ 08:54) (91% - 97%)    Constitutional: NAD.   HEENT: PERRL, EOMI, MMM.  Neck: (+) trach.  Respiratory: Breath sounds are clear bilaterally, No wheezing, rales or rhonchi  Cardiovascular: (+) irregular.  Gastrointestinal: Bowel Sounds present, soft, nontender, nondistended, no guarding, no rebound, no mass.  Extremities: (+) L BKA.  Vascular: 2+ peripheral pulses  Neurological: A/O x 1, no focal deficits  Musculoskeletal: 5/5 strength b/l upper and lower extremities  Skin:  no visible rashes.                         10.4   6.23  )-----------( 213      ( 07 Jul 2020 06:40 )             32.0       07-07    139  |  104  |  14  ----------------------------<  117<H>  4.1   |  31  |  0.60    Ca    8.8      07 Jul 2020 06:40  Phos  4.3     07-07  Mg     1.9     07-07

## 2020-07-08 NOTE — PROGRESS NOTE ADULT - SUBJECTIVE AND OBJECTIVE BOX
Subjective:  no acute events overnight.    Vital Signs:  Vital Signs Last 24 Hrs  T(C): 37.4 (07-08-20 @ 05:00), Max: 37.9 (07-07-20 @ 21:08)  T(F): 99.3 (07-08-20 @ 05:00), Max: 100.2 (07-07-20 @ 21:08)  HR: 89 (07-08-20 @ 08:54) (83 - 109)  BP: 121/66 (07-08-20 @ 05:00) (91/60 - 134/116)  RR: 18 (07-08-20 @ 05:00) (18 - 31)  SpO2: 94% (07-08-20 @ 08:54) (91% - 97%) on room air      Relevant labs, radiology and Medications reviewed                        10.4   6.23  )-----------( 213      ( 07 Jul 2020 06:40 )             32.0     07-07    139  |  104  |  14  ----------------------------<  117<H>  4.1   |  31  |  0.60    Ca    8.8      07 Jul 2020 06:40  Phos  4.3     07-07  Mg     1.9     07-07        MEDICATIONS  (STANDING):  ALBUTerol    90 MICROgram(s) HFA Inhaler 2 Puff(s) Inhalation every 4 hours  aspirin  chewable 81 milliGRAM(s) Oral daily  budesonide 160 MICROgram(s)/formoterol 4.5 MICROgram(s) Inhaler 2 Puff(s) Inhalation two times a day  clonazePAM  Tablet 0.5 milliGRAM(s) Oral two times a day  clopidogrel Tablet 75 milliGRAM(s) Oral daily  diltiazem    Tablet 60 milliGRAM(s) Oral every 6 hours  doxazosin 2 milliGRAM(s) Oral at bedtime  enoxaparin Injectable 40 milliGRAM(s) SubCutaneous every 12 hours  famotidine    Tablet 20 milliGRAM(s) Oral two times a day  gabapentin   Solution 400 milliGRAM(s) Enteral Tube three times a day  melatonin 5 milliGRAM(s) Oral at bedtime  metoprolol tartrate 25 milliGRAM(s) Oral two times a day  nicotine -  14 mG/24Hr(s) Patch 1 patch Transdermal daily  polyethylene glycol 3350 17 Gram(s) Oral daily  QUEtiapine 200 milliGRAM(s) Oral at bedtime  QUEtiapine 100 milliGRAM(s) Oral <User Schedule>  senna 2 Tablet(s) Oral at bedtime  thiamine 100 milliGRAM(s) Oral daily  tiotropium 18 MICROgram(s) Capsule 1 Capsule(s) Inhalation daily    MEDICATIONS  (PRN):  acetaminophen   Tablet .. 650 milliGRAM(s) Oral every 6 hours PRN Temp greater or equal to 38.5C (101.3F)  ibuprofen  Tablet. 400 milliGRAM(s) Oral every 6 hours PRN Moderate Pain (4 - 6)      Physical exam  Gen: NAD  Neuro: AO x 3   Card: S1 S2  Pulm: Equal bilaterally  Abd: soft PEG in place  Ext: no edema      I&O's Summary    07 Jul 2020 07:01  -  08 Jul 2020 07:00  --------------------------------------------------------  IN: 1551 mL / OUT: 1225 mL / NET: 326 mL        Assessment  65y Male  w/ PAST MEDICAL & SURGICAL HISTORY:  Chronic CHF  COPD without exacerbation  Atrial fibrillation  Presence of Watchman left atrial appendage closure device  Hypertension  GERD (gastroesophageal reflux disease)  Chronic obstructive pulmonary disease (COPD)  Anemia  Falls  Meningitis  Collapsed lung  Alcohol withdrawal  Emphysema of lung  Cirrhosis  CHF (congestive heart failure)  Poor historian  Alcohol abuse  Chronic atrial fibrillation  Unilateral amputation of lower extremity below knee  Presence of Watchman left atrial appendage closure device  S/P BKA (below knee amputation) unilateral, left    Patient is a 65y old  Male who presents with a chief complaint of acute hypoxemic, hypercapneic resp failure  COPD exacerbation (07 Jul 2020 14:12)    1.  Chronic respiratory failure  2.  COPD exacerbation  3.  Pneumonia  4.  Leukocytosis and fevers - now resolved  5.  Agitation and confusion    Continue capping trach as tolerated.  Continue diet.  Would not decannulate this hospital admission.  May follow up as outpatient with Dr. Morris for trach removal.  Discharge planning.    Discussed with Cardiothoracic Team at AM rounds.

## 2020-07-08 NOTE — PROGRESS NOTE ADULT - NSTELEHEALTH_GEN_ALL_CORE
No

## 2020-07-08 NOTE — PROVIDER CONTACT NOTE (FALL NOTIFICATION) - ACTION/TREATMENT ORDERED:
pt's daughter Litzy notified of the incidentlat 1355pm, MD aware, no visible physical injuries noted, pt AOX3, no change in pt's discharge plan to go to [facility]

## 2020-07-08 NOTE — PROGRESS NOTE ADULT - PROVIDER SPECIALTY LIST ADULT
CCU
Cardiology
Critical Care
Family Medicine
Family Medicine
Hospitalist
Infectious Disease
Neurology
Pulmonology
Surgery
Thoracic Surgery
Critical Care
Pulmonology
Thoracic Surgery
Pulmonology
Pulmonology
Critical Care

## 2020-07-08 NOTE — PROGRESS NOTE ADULT - SUBJECTIVE AND OBJECTIVE BOX
Subjective:    pat condition unchanged, lying in bed.    Home Medications:  acetaminophen 325 mg oral tablet: 2 tab(s) orally every 6 hours, As needed, Temp greater or equal to 38.5C (101.3F) (02 Jun 2020 12:53)  albuterol 90 mcg/inh inhalation aerosol: 2 puff(s) inhaled every 4 hours (02 Jun 2020 12:53)  ALPRAZolam 0.5 mg oral tablet: 1 tab(s) orally every 6 hours (02 Jun 2020 12:53)  aspirin 81 mg oral tablet, chewable: 1 tab(s) orally once a day (02 Jun 2020 12:53)  budesonide-formoterol 160 mcg-4.5 mcg/inh inhalation aerosol: 2 puff(s) inhaled 2 times a day (08 Jul 2020 12:13)  clopidogrel 75 mg oral tablet: 1 tab(s) orally once a day (02 Jun 2020 12:53)  dilTIAZem 60 mg oral tablet: 1 tab(s) orally every 6 hours (08 Jul 2020 12:17)  doxazosin 2 mg oral tablet: 1 tab(s) orally once a day (at bedtime) (02 Jun 2020 12:53)  famotidine 20 mg oral tablet: 1 tab(s) orally 2 times a day (08 Jul 2020 12:13)  gabapentin 400 mg oral capsule: 1 cap(s) orally 3 times a day (02 Jun 2020 12:53)  metoprolol tartrate 25 mg oral tablet: 1 tab(s) orally 2 times a day (08 Jul 2020 12:13)  nicotine 14 mg/24 hr transdermal film, extended release: 14 milligram(s) transdermal once a day (08 Jul 2020 12:13)  pantoprazole 40 mg oral granule, enteric coated: 40 milligram(s) orally once a day (19 Apr 2020 03:12)  polyethylene glycol 3350 oral powder for reconstitution: 17 gram(s) orally once a day (08 Jul 2020 12:13)  QUEtiapine 100 mg oral tablet: 1 tab(s) orally once a day (at bedtime) (02 Jun 2020 12:53)  QUEtiapine 50 mg oral tablet: 1 tab(s) orally once a day (02 Jun 2020 12:53)  senna oral tablet: 2 tab(s) orally once a day (at bedtime) (08 Jul 2020 12:13)  Spiriva 18 mcg inhalation capsule: 1 cap(s) inhaled once a day (19 Apr 2020 03:12)  thiamine 100 mg oral tablet: 1 tab(s) orally once a day (02 Jun 2020 12:53)    MEDICATIONS  (STANDING):  ALBUTerol    90 MICROgram(s) HFA Inhaler 2 Puff(s) Inhalation every 4 hours  aspirin  chewable 81 milliGRAM(s) Oral daily  budesonide 160 MICROgram(s)/formoterol 4.5 MICROgram(s) Inhaler 2 Puff(s) Inhalation two times a day  clonazePAM  Tablet 0.5 milliGRAM(s) Oral two times a day  clopidogrel Tablet 75 milliGRAM(s) Oral daily  diltiazem    Tablet 60 milliGRAM(s) Oral every 6 hours  doxazosin 2 milliGRAM(s) Oral at bedtime  enoxaparin Injectable 40 milliGRAM(s) SubCutaneous every 12 hours  famotidine    Tablet 20 milliGRAM(s) Oral two times a day  gabapentin   Solution 400 milliGRAM(s) Enteral Tube three times a day  melatonin 5 milliGRAM(s) Oral at bedtime  metoprolol tartrate 25 milliGRAM(s) Oral two times a day  nicotine -  14 mG/24Hr(s) Patch 1 patch Transdermal daily  polyethylene glycol 3350 17 Gram(s) Oral daily  QUEtiapine 200 milliGRAM(s) Oral at bedtime  QUEtiapine 100 milliGRAM(s) Oral <User Schedule>  senna 2 Tablet(s) Oral at bedtime  thiamine 100 milliGRAM(s) Oral daily  tiotropium 18 MICROgram(s) Capsule 1 Capsule(s) Inhalation daily    MEDICATIONS  (PRN):  acetaminophen   Tablet .. 650 milliGRAM(s) Oral every 6 hours PRN Temp greater or equal to 38.5C (101.3F)  ibuprofen  Tablet. 400 milliGRAM(s) Oral every 6 hours PRN Moderate Pain (4 - 6)      Allergies    Ceclor (Rash)  Duricef (Rash)    Intolerances        Vital Signs Last 24 Hrs  T(C): 37.2 (08 Jul 2020 13:42), Max: 37.9 (07 Jul 2020 21:08)  T(F): 99 (08 Jul 2020 13:42), Max: 100.2 (07 Jul 2020 21:08)  HR: 102 (08 Jul 2020 13:42) (82 - 109)  BP: 129/70 (08 Jul 2020 13:42) (106/60 - 134/116)  BP(mean): 74 (07 Jul 2020 20:00) (69 - 120)  RR: 18 (08 Jul 2020 13:42) (18 - 31)  SpO2: 96% (08 Jul 2020 13:42) (93% - 97%)      PHYSICAL EXAMINATION:    NECK:  Supple. No lymphadenopathy. Jugular venous pressure not elevated. Carotids equal.   HEART:   The cardiac impulse has a normal quality. Reg., Nl S1 and S2.  There are no murmurs, rubs or gallops noted  CHEST:  Chest is clear to auscultation. Normal respiratory effort.  ABDOMEN:  Soft and nontender.   EXTREMITIES:  There is no edema.       LABS:                        10.4   6.23  )-----------( 213      ( 07 Jul 2020 06:40 )             32.0     07-07    139  |  104  |  14  ----------------------------<  117<H>  4.1   |  31  |  0.60    Ca    8.8      07 Jul 2020 06:40  Phos  4.3     07-07  Mg     1.9     07-07

## 2020-07-08 NOTE — PROGRESS NOTE ADULT - REASON FOR ADMISSION
acute hypoxemic, hypercapneic resp failure  COPD exacerbation
acute hypoxemic, hypercapnic resp failure  COPD exacerbation
acute hypoxemic, hypercapneic resp failure  COPD exacerbation
acute hypoxemic, hypercapnic resp failure  COPD exacerbation
acute hypoxemic, hyper capneic resp failure  COPD exacerbation
acute hypoxemic, hypercapneic resp failure  COPD exacerbation
Respiratory Failure
acute hypoxemic, hypercapneic resp failure  COPD exacerbation

## 2020-07-08 NOTE — PROGRESS NOTE ADULT - ASSESSMENT
acute respiratory failure.  COPD exacerbation complicated by CRP in the sputum and MSSA sepsis.  -sputum Cx, CRP.  -MYA negative vegetations.  -serial BCx, no growth.  -trach cuffless #6 fenestrated.  keep capped as tolerated.  decannulation deferred (patient's 3rd trach).    AF w/ RVR.  -anticoagulation:  Watchman device, ergo AC deferred.  -rate control:  diltiazem 60mg po q6h + metoprolol tartrate 25mg po bid.    encephalopathy/delirium.  -quetiapine.  -clonazepam.    urinary retention.  -resolved.  -Cardura.    debility.  -OOB to chair.  -PT.    DVT prophylaxis.  -LMWH.    disposition.  -general medical [facility].  -anticipate SNF. acute respiratory failure.  COPD exacerbation complicated by CRP in the sputum and MSSA sepsis.  -sputum Cx, CRP.  -MYA negative vegetations.  -serial BCx, no growth.  -trach cuffless #6 fenestrated.  keep capped as tolerated.  decannulation deferred (patient's 3rd trach).    AF w/ RVR.  -anticoagulation:  Watchman device, ergo AC deferred.  -rate control:  diltiazem 60mg po q6h + metoprolol tartrate 25mg po bid.    encephalopathy/delirium.  -quetiapine.  -clonazepam.    urinary retention.  -resolved.  -Cardura.    debility.  -OOB to chair.  -PT.    DVT prophylaxis.  -LMWH.    disposition.  -general medical [facility].  -anticipate SNF.    communication.  -RN.  -case management.  -PMD, Dr. Pamela Vasquez.  message left w/ service.  call back requested.

## 2020-07-08 NOTE — PROVIDER CONTACT NOTE (OTHER) - SITUATION
is aware that the pt is in the hospital he saw the pt already
Called service spoke to Mechelle. MD will see the patient.
DR SERVICE AWARE
Pt with pink blood tinged urine
acute hypoxemic hypercapnic respiratory failure, COPD, history of AF with watchman device, GERD, HTN    left message with ans service for dr to see pt in the morning

## 2020-07-14 DIAGNOSIS — N21.1 CALCULUS IN URETHRA: ICD-10-CM

## 2020-07-14 DIAGNOSIS — F05 DELIRIUM DUE TO KNOWN PHYSIOLOGICAL CONDITION: ICD-10-CM

## 2020-07-14 DIAGNOSIS — K76.6 PORTAL HYPERTENSION: ICD-10-CM

## 2020-07-14 DIAGNOSIS — E83.39 OTHER DISORDERS OF PHOSPHORUS METABOLISM: ICD-10-CM

## 2020-07-14 DIAGNOSIS — Z60.2 PROBLEMS RELATED TO LIVING ALONE: ICD-10-CM

## 2020-07-14 DIAGNOSIS — R16.1 SPLENOMEGALY, NOT ELSEWHERE CLASSIFIED: ICD-10-CM

## 2020-07-14 DIAGNOSIS — I50.33 ACUTE ON CHRONIC DIASTOLIC (CONGESTIVE) HEART FAILURE: ICD-10-CM

## 2020-07-14 DIAGNOSIS — E43 UNSPECIFIED SEVERE PROTEIN-CALORIE MALNUTRITION: ICD-10-CM

## 2020-07-14 DIAGNOSIS — R31.9 HEMATURIA, UNSPECIFIED: ICD-10-CM

## 2020-07-14 DIAGNOSIS — A41.01 SEPSIS DUE TO METHICILLIN SUSCEPTIBLE STAPHYLOCOCCUS AUREUS: ICD-10-CM

## 2020-07-14 DIAGNOSIS — I48.20 CHRONIC ATRIAL FIBRILLATION, UNSPECIFIED: ICD-10-CM

## 2020-07-14 DIAGNOSIS — K74.60 UNSPECIFIED CIRRHOSIS OF LIVER: ICD-10-CM

## 2020-07-14 DIAGNOSIS — J15.212 PNEUMONIA DUE TO METHICILLIN RESISTANT STAPHYLOCOCCUS AUREUS: ICD-10-CM

## 2020-07-14 DIAGNOSIS — J96.22 ACUTE AND CHRONIC RESPIRATORY FAILURE WITH HYPERCAPNIA: ICD-10-CM

## 2020-07-14 DIAGNOSIS — R60.1 GENERALIZED EDEMA: ICD-10-CM

## 2020-07-14 DIAGNOSIS — R33.8 OTHER RETENTION OF URINE: ICD-10-CM

## 2020-07-14 DIAGNOSIS — R47.81 SLURRED SPEECH: ICD-10-CM
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
## 2020-07-14 DIAGNOSIS — Z78.1 PHYSICAL RESTRAINT STATUS: ICD-10-CM

## 2020-07-14 DIAGNOSIS — L89.322 PRESSURE ULCER OF LEFT BUTTOCK, STAGE 2: ICD-10-CM

## 2020-07-14 DIAGNOSIS — S30.1XXA CONTUSION OF ABDOMINAL WALL, INITIAL ENCOUNTER: ICD-10-CM

## 2020-07-14 DIAGNOSIS — F10.239 ALCOHOL DEPENDENCE WITH WITHDRAWAL, UNSPECIFIED: ICD-10-CM

## 2020-07-14 DIAGNOSIS — Z88.8 ALLERGY STATUS TO OTHER DRUGS, MEDICAMENTS AND BIOLOGICAL SUBSTANCES: ICD-10-CM
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
## 2020-07-14 DIAGNOSIS — N40.1 BENIGN PROSTATIC HYPERPLASIA WITH LOWER URINARY TRACT SYMPTOMS: ICD-10-CM

## 2020-07-14 DIAGNOSIS — Z87.891 PERSONAL HISTORY OF NICOTINE DEPENDENCE: ICD-10-CM

## 2020-07-14 DIAGNOSIS — J96.21 ACUTE AND CHRONIC RESPIRATORY FAILURE WITH HYPOXIA: ICD-10-CM

## 2020-07-14 DIAGNOSIS — Z91.19 PATIENT'S NONCOMPLIANCE WITH OTHER MEDICAL TREATMENT AND REGIMEN: ICD-10-CM

## 2020-07-14 DIAGNOSIS — J43.9 EMPHYSEMA, UNSPECIFIED: ICD-10-CM

## 2020-07-14 DIAGNOSIS — F39 UNSPECIFIED MOOD [AFFECTIVE] DISORDER: ICD-10-CM

## 2020-07-14 DIAGNOSIS — Z99.11 DEPENDENCE ON RESPIRATOR [VENTILATOR] STATUS: ICD-10-CM

## 2020-07-14 DIAGNOSIS — J15.1 PNEUMONIA DUE TO PSEUDOMONAS: ICD-10-CM

## 2020-07-14 DIAGNOSIS — I11.0 HYPERTENSIVE HEART DISEASE WITH HEART FAILURE: ICD-10-CM

## 2020-07-14 DIAGNOSIS — J95.09 OTHER TRACHEOSTOMY COMPLICATION: ICD-10-CM

## 2020-07-14 DIAGNOSIS — G40.909 EPILEPSY, UNSPECIFIED, NOT INTRACTABLE, WITHOUT STATUS EPILEPTICUS: ICD-10-CM

## 2020-07-14 DIAGNOSIS — E87.5 HYPERKALEMIA: ICD-10-CM

## 2020-07-14 DIAGNOSIS — R65.20 SEVERE SEPSIS WITHOUT SEPTIC SHOCK: ICD-10-CM

## 2020-07-14 DIAGNOSIS — G47.33 OBSTRUCTIVE SLEEP APNEA (ADULT) (PEDIATRIC): ICD-10-CM

## 2020-07-14 DIAGNOSIS — L03.113 CELLULITIS OF RIGHT UPPER LIMB: ICD-10-CM

## 2020-07-14 DIAGNOSIS — J84.9 INTERSTITIAL PULMONARY DISEASE, UNSPECIFIED: ICD-10-CM

## 2020-07-14 DIAGNOSIS — Z79.82 LONG TERM (CURRENT) USE OF ASPIRIN: ICD-10-CM

## 2020-07-14 DIAGNOSIS — R45.1 RESTLESSNESS AND AGITATION: ICD-10-CM

## 2020-07-14 DIAGNOSIS — K21.9 GASTRO-ESOPHAGEAL REFLUX DISEASE WITHOUT ESOPHAGITIS: ICD-10-CM

## 2020-07-14 DIAGNOSIS — G92 TOXIC ENCEPHALOPATHY: ICD-10-CM

## 2020-07-14 DIAGNOSIS — N39.0 URINARY TRACT INFECTION, SITE NOT SPECIFIED: ICD-10-CM

## 2020-07-14 DIAGNOSIS — Z88.0 ALLERGY STATUS TO PENICILLIN: ICD-10-CM
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
## 2020-07-14 DIAGNOSIS — E87.0 HYPEROSMOLALITY AND HYPERNATREMIA: ICD-10-CM

## 2020-07-14 DIAGNOSIS — Z95.818 PRESENCE OF OTHER CARDIAC IMPLANTS AND GRAFTS: ICD-10-CM

## 2020-07-14 DIAGNOSIS — I27.20 PULMONARY HYPERTENSION, UNSPECIFIED: ICD-10-CM

## 2020-07-14 DIAGNOSIS — I08.1 RHEUMATIC DISORDERS OF BOTH MITRAL AND TRICUSPID VALVES: ICD-10-CM

## 2020-07-14 DIAGNOSIS — Z89.512 ACQUIRED ABSENCE OF LEFT LEG BELOW KNEE: ICD-10-CM

## 2020-07-14 DIAGNOSIS — E66.2 MORBID (SEVERE) OBESITY WITH ALVEOLAR HYPOVENTILATION: ICD-10-CM

## 2020-07-14 SDOH — SOCIAL STABILITY - SOCIAL INSECURITY: PROBLEMS RELATED TO LIVING ALONE: Z60.2

## 2020-07-30 ENCOUNTER — OUTPATIENT (OUTPATIENT)
Dept: OUTPATIENT SERVICES | Facility: HOSPITAL | Age: 65
LOS: 1 days | End: 2020-07-30
Payer: MEDICARE

## 2020-07-30 DIAGNOSIS — S88.119A COMPLETE TRAUMATIC AMPUTATION AT LEVEL BETWEEN KNEE AND ANKLE, UNSPECIFIED LOWER LEG, INITIAL ENCOUNTER: Chronic | ICD-10-CM

## 2020-07-30 DIAGNOSIS — Z95.818 PRESENCE OF OTHER CARDIAC IMPLANTS AND GRAFTS: Chronic | ICD-10-CM

## 2020-07-30 DIAGNOSIS — Z89.512 ACQUIRED ABSENCE OF LEFT LEG BELOW KNEE: Chronic | ICD-10-CM

## 2020-07-30 DIAGNOSIS — R13.12 DYSPHAGIA, OROPHARYNGEAL PHASE: ICD-10-CM

## 2020-07-30 PROCEDURE — 74230 X-RAY XM SWLNG FUNCJ C+: CPT

## 2020-07-30 PROCEDURE — 74230 X-RAY XM SWLNG FUNCJ C+: CPT | Mod: 26

## 2020-07-30 PROCEDURE — 92611 MOTION FLUOROSCOPY/SWALLOW: CPT | Mod: GN

## 2020-07-30 NOTE — SWALLOW VFSS/MBS ASSESSMENT ADULT - COMMENTS
The patient was referred for an outpatient Modified Barium Swallowing Study(MBS). He currently resides at [facility]. Patient was recently hospitalized at [facility] in 4/20. Hospital course at the time was notable for encephalopathy, ETOH withdrawal, rapid A-Fib, pneumonia with MSSA, COPD exacerbation, hypoxia warranting transient mechanical ventilation/transient tracheostomy placement, bleed in right abdominal rectus region, and Dysphagia status post PEG placement. At hospital, he was on a puree diet with honey thick liquids with supplemental feedings via PEG. See below for additional prior medical information.

## 2020-07-30 NOTE — SWALLOW VFSS/MBS ASSESSMENT ADULT - ORAL PREP COMMENTS
The patient was variably fatigued and internally distractible when PO was offered. Labial grading on utensils was functional. No dribbling was noted.

## 2020-07-30 NOTE — SWALLOW VFSS/MBS ASSESSMENT ADULT - ADDITIONAL INFORMATION
The patient was postured upright in a JAMIE chair and was studied in the lateral plane. The patient was postured upright in a JAMIE chair and was studied in the lateral plane. The patient was variably fatigued and internally distractible during the exam. The patient was postured upright in a JAMIE chair for testing and was studied in the lateral plane. The patient was variably fatigued and internally distractible during the exam.

## 2020-07-30 NOTE — SWALLOW VFSS/MBS ASSESSMENT ADULT - CONSISTENCIES ADMINISTERED
The patient was offered barium injected mechanical soft foods and barium injected pureed foods as well as honey thick/nectar thick liquids with assist. The patient was offered barium coated mechanical soft foods and barium injected pureed foods as well as honey thick/nectar thick liquids with assist.

## 2020-07-30 NOTE — SWALLOW VFSS/MBS ASSESSMENT ADULT - ORAL PHASE COMMENTS
Bolus formation/transfer with mechanical soft solids were characterized by moderately to excessively prolonged discontinuous munch masticatory motions. Bolus formation/transfer with pureed foods and thick liquids were achieved via mildly to moderately prolonged lingual pumping actions that were felt to be grossly functional with the latter pureed foods/thick liquids. Piecemeal deglutition was evident. Scattered tongue debris was noted with mechanical soft solids. Tongue base retraction was decreased. Premature loss of nectar thick liquids to the Valleculae was demonstrated prior to triggering of pharyngeal swallowing trials.

## 2020-07-30 NOTE — SWALLOW VFSS/MBS ASSESSMENT ADULT - DIAGNOSTIC IMPRESSIONS
The patient demonstrates periodically reduced orientation to feeding atop Oropharyngeal Dysphagia which is felt to be a functionalcondition with a restricted inventory of modified food consistencies. Aspiration, with latent unproductive coughing, was demonstrated during swallowing trials with nectar thick liquids, despite cues to employ compensatory swallowing maneuvers. No laryngeal penetration, or aspiration were demonstrated with honey thick liquids, pureed foods and mechanical soft solids. However, it should be noted that a mild to moderate amount of post prandial pharyngeal retention was visualized in the Valleculae/along the posterior pharyngeal wall/In the Pyriform Sinuses with the latter food consistencies. Of further note is that employment of a self generated dry swallow after primary swallowing trials was moderately effective in clearing pharyngeal stasis/residue. Coarse solids and thin liquids were not offered given the severity of patient's Dysphagia. The patient demonstrates periodically reduced orientation to feeding atop Oropharyngeal Dysphagia which is felt to be a functional condition with a restricted inventory of modified food consistencies. Aspiration, with latent unproductive coughing, was demonstrated during swallowing trials with nectar thick liquids, despite cues to employ compensatory swallowing maneuvers. No laryngeal penetration, or aspiration were demonstrated with honey thick liquids, pureed foods and mechanical soft solids. However, it should be noted that a mild to moderate amount of post prandial pharyngeal retention was visualized in the Valleculae/along the posterior pharyngeal wall/In the Pyriform Sinuses with the latter food consistencies. Of further note is that employment of a self generated dry swallow after primary swallowing trials was moderately effective in clearing pharyngeal stasis/residue. Coarse solids and thin liquids were not offered given the severity of patient's Dysphagia.

## 2020-07-30 NOTE — SWALLOW VFSS/MBS ASSESSMENT ADULT - PHARYNGEAL PHASE COMMENTS
Swallow trigger was timely to mildly latent. Hyolaryngeal excursion and epiglottic retroflexion during the swallow were mildly to moderately decreased which compromised prandial airway protection at times. Pharyngeal motility was mildly to moderately decreased but felt to be grossly functional with some of the above modified food consistencies. Cricopharyngeal sphincter opening was functional for age. Aspiration, with latent unproductive coughing, was demonstrated during swallowing trials with nectar thick liquids, despite cues to employ compensatory swallowing maneuvers. No laryngeal penetration, or aspiration were demonstrated with honey thick liquids, pureed foods and mechanical soft solids. However, it should be noted that a mild to moderate amount of post prandial pharyngeal retention was visualized in the Valleculae/along the posterior pharyngeal wall/In the Pyriform Sinuses with the latter food consistencies. Of further note is that employment of a self generated dry swallow after primary swallowing trials was moderately effective in clearing pharyngeal stasis/residue. Coarse solids and thin liquids were not offered given the severity of patient's Dysphagia.

## 2020-07-31 DIAGNOSIS — R13.12 DYSPHAGIA, OROPHARYNGEAL PHASE: ICD-10-CM

## 2020-08-11 ENCOUNTER — EMERGENCY (EMERGENCY)
Facility: HOSPITAL | Age: 65
LOS: 0 days | Discharge: HOME | End: 2020-08-11
Attending: EMERGENCY MEDICINE
Payer: MEDICARE

## 2020-08-11 VITALS
SYSTOLIC BLOOD PRESSURE: 133 MMHG | OXYGEN SATURATION: 97 % | RESPIRATION RATE: 18 BRPM | HEART RATE: 76 BPM | TEMPERATURE: 97 F | DIASTOLIC BLOOD PRESSURE: 90 MMHG

## 2020-08-11 VITALS
RESPIRATION RATE: 20 BRPM | TEMPERATURE: 97 F | OXYGEN SATURATION: 99 % | SYSTOLIC BLOOD PRESSURE: 106 MMHG | DIASTOLIC BLOOD PRESSURE: 66 MMHG | WEIGHT: 244.05 LBS | HEART RATE: 71 BPM | HEIGHT: 67 IN

## 2020-08-11 DIAGNOSIS — S88.119A COMPLETE TRAUMATIC AMPUTATION AT LEVEL BETWEEN KNEE AND ANKLE, UNSPECIFIED LOWER LEG, INITIAL ENCOUNTER: Chronic | ICD-10-CM

## 2020-08-11 DIAGNOSIS — Z86.61 PERSONAL HISTORY OF INFECTIONS OF THE CENTRAL NERVOUS SYSTEM: ICD-10-CM

## 2020-08-11 DIAGNOSIS — Z91.81 HISTORY OF FALLING: ICD-10-CM

## 2020-08-11 DIAGNOSIS — Z89.512 ACQUIRED ABSENCE OF LEFT LEG BELOW KNEE: ICD-10-CM

## 2020-08-11 DIAGNOSIS — I50.9 HEART FAILURE, UNSPECIFIED: ICD-10-CM

## 2020-08-11 DIAGNOSIS — I11.0 HYPERTENSIVE HEART DISEASE WITH HEART FAILURE: ICD-10-CM

## 2020-08-11 DIAGNOSIS — K74.60 UNSPECIFIED CIRRHOSIS OF LIVER: ICD-10-CM

## 2020-08-11 DIAGNOSIS — Z95.818 PRESENCE OF OTHER CARDIAC IMPLANTS AND GRAFTS: Chronic | ICD-10-CM

## 2020-08-11 DIAGNOSIS — Z43.1 ENCOUNTER FOR ATTENTION TO GASTROSTOMY: ICD-10-CM

## 2020-08-11 DIAGNOSIS — I48.20 CHRONIC ATRIAL FIBRILLATION, UNSPECIFIED: ICD-10-CM

## 2020-08-11 DIAGNOSIS — F03.90 UNSPECIFIED DEMENTIA WITHOUT BEHAVIORAL DISTURBANCE: ICD-10-CM

## 2020-08-11 DIAGNOSIS — J43.9 EMPHYSEMA, UNSPECIFIED: ICD-10-CM

## 2020-08-11 DIAGNOSIS — Z79.82 LONG TERM (CURRENT) USE OF ASPIRIN: ICD-10-CM

## 2020-08-11 DIAGNOSIS — Z11.59 ENCOUNTER FOR SCREENING FOR OTHER VIRAL DISEASES: ICD-10-CM

## 2020-08-11 DIAGNOSIS — Z88.1 ALLERGY STATUS TO OTHER ANTIBIOTIC AGENTS STATUS: ICD-10-CM

## 2020-08-11 DIAGNOSIS — Z89.512 ACQUIRED ABSENCE OF LEFT LEG BELOW KNEE: Chronic | ICD-10-CM

## 2020-08-11 DIAGNOSIS — F10.10 ALCOHOL ABUSE, UNCOMPLICATED: ICD-10-CM

## 2020-08-11 DIAGNOSIS — K21.9 GASTRO-ESOPHAGEAL REFLUX DISEASE WITHOUT ESOPHAGITIS: ICD-10-CM

## 2020-08-11 LAB — SARS-COV-2 RNA SPEC QL NAA+PROBE: SIGNIFICANT CHANGE UP

## 2020-08-11 PROCEDURE — 99283 EMERGENCY DEPT VISIT LOW MDM: CPT

## 2020-08-11 PROCEDURE — 49465 FLUORO EXAM OF G/COLON TUBE: CPT

## 2020-08-11 PROCEDURE — 99283 EMERGENCY DEPT VISIT LOW MDM: CPT | Mod: 25

## 2020-08-11 PROCEDURE — 87635 SARS-COV-2 COVID-19 AMP PRB: CPT

## 2020-08-11 NOTE — ED ADULT NURSE NOTE - CAS EDN DISCHARGE ASSESSMENT
Patient baseline mental status/alert, awake and confused currently, no acute respiratory distress, peg tube site clean, dry and intact

## 2020-08-11 NOTE — ED PROVIDER NOTE - NS_ ATTENDINGSCRIBEDETAILS _ED_A_ED_FT
I, Erik Carlos MD,  performed the initial face to face bedside interview with this patient regarding history of present illness, review of symptoms and relevant past medical, social and family history.  I completed an independent physical examination.    The history, relevant review of systems, past medical and surgical history, medical decision making, and physical examination was documented by the scribe in my presence and I attest to the accuracy of the documentation.

## 2020-08-11 NOTE — ED PROVIDER NOTE - OBJECTIVE STATEMENT
64 y/o M with PMHx of CHF, COPD, Afib, ETOH abuse, GERD, and HTN presents to the ED BIBEMS from [place] for PEG tube placement verification. No fever. Allergic to Ceclor and Duricef. PCP: Dr. Augusta Vasquez. Pt baseline nonverbal, unable to provide hx.

## 2020-08-11 NOTE — ED ADULT NURSE NOTE - OBJECTIVE STATEMENT
sent in from snf for peg tube placement verification HX: left AkA, trach stoma, encephalopathy sent in from snf for peg tube placement verification HX: left BkA, trach stoma, encephalopathy

## 2020-08-11 NOTE — ED PROVIDER NOTE - PATIENT PORTAL LINK FT
You can access the FollowMyHealth Patient Portal offered by [facility] by registering at the following website: [email or web address]. By joining [employer]’s FollowMyHealth portal, you will also be able to view your health information using other applications (apps) compatible with our system.

## 2020-08-11 NOTE — ED PROVIDER NOTE - SKIN, MLM
CAD (coronary artery disease)    DM (diabetes mellitus)    HLD (hyperlipidemia)    Hypertension    MI (myocardial infarction)    Obesity    Pulmonary embolism    Rheumatoid arthritis    Thyroid disease Skin normal color for race, warm, dry and intact. No evidence of rash.

## 2020-09-15 ENCOUNTER — APPOINTMENT (OUTPATIENT)
Dept: PULMONOLOGY | Facility: CLINIC | Age: 65
End: 2020-09-15

## 2020-09-22 ENCOUNTER — APPOINTMENT (OUTPATIENT)
Dept: PULMONOLOGY | Facility: CLINIC | Age: 65
End: 2020-09-22
Payer: MEDICARE

## 2020-09-22 VITALS
WEIGHT: 264 LBS | OXYGEN SATURATION: 95 % | SYSTOLIC BLOOD PRESSURE: 120 MMHG | HEIGHT: 69 IN | DIASTOLIC BLOOD PRESSURE: 64 MMHG | BODY MASS INDEX: 39.1 KG/M2 | HEART RATE: 78 BPM

## 2020-09-22 DIAGNOSIS — F10.21 ALCOHOL DEPENDENCE, IN REMISSION: ICD-10-CM

## 2020-09-22 PROCEDURE — 99215 OFFICE O/P EST HI 40 MIN: CPT

## 2020-09-22 RX ORDER — DILTIAZEM HYDROCHLORIDE 90 MG/1
90 TABLET ORAL
Qty: 90 | Refills: 0 | Status: DISCONTINUED | COMMUNITY
Start: 2019-06-17 | End: 2020-09-22

## 2020-09-22 RX ORDER — HYDROCODONE BITARTRATE AND ACETAMINOPHEN 5; 325 MG/1; MG/1
5-325 TABLET ORAL
Qty: 10 | Refills: 0 | Status: DISCONTINUED | COMMUNITY
Start: 2019-03-18 | End: 2020-09-22

## 2020-09-22 RX ORDER — TRAZODONE HYDROCHLORIDE 50 MG/1
50 TABLET ORAL
Qty: 60 | Refills: 0 | Status: DISCONTINUED | COMMUNITY
Start: 2017-02-03 | End: 2020-09-22

## 2020-09-22 RX ORDER — FERROUS SULFATE TAB 325 MG (65 MG ELEMENTAL FE) 325 (65 FE) MG
325 (65 FE) TAB ORAL
Qty: 30 | Refills: 0 | Status: DISCONTINUED | COMMUNITY
Start: 2020-02-23 | End: 2020-09-22

## 2020-09-22 RX ORDER — LIDOCAINE 5% 700 MG/1
5 PATCH TOPICAL
Qty: 1 | Refills: 1 | Status: DISCONTINUED | COMMUNITY
Start: 2019-12-10 | End: 2020-09-22

## 2020-09-22 RX ORDER — PANTOPRAZOLE 40 MG/1
40 TABLET, DELAYED RELEASE ORAL
Qty: 90 | Refills: 5 | Status: DISCONTINUED | COMMUNITY
Start: 2018-02-05 | End: 2020-09-22

## 2020-09-22 RX ORDER — CHLORHEXIDINE GLUCONATE 4 %
325 (65 FE) LIQUID (ML) TOPICAL DAILY
Qty: 30 | Refills: 3 | Status: DISCONTINUED | COMMUNITY
Start: 2019-10-22 | End: 2020-09-22

## 2020-09-22 RX ORDER — DILTIAZEM HYDROCHLORIDE 180 MG/1
180 CAPSULE, EXTENDED RELEASE ORAL
Qty: 90 | Refills: 3 | Status: DISCONTINUED | COMMUNITY
Start: 2019-02-28 | End: 2020-09-22

## 2020-09-22 RX ORDER — CIPROFLOXACIN HYDROCHLORIDE 500 MG/1
500 TABLET, FILM COATED ORAL
Qty: 4 | Refills: 0 | Status: DISCONTINUED | COMMUNITY
Start: 2019-06-01 | End: 2020-09-22

## 2020-09-22 RX ORDER — GABAPENTIN 400 MG/1
400 CAPSULE ORAL 3 TIMES DAILY
Qty: 90 | Refills: 0 | Status: DISCONTINUED | COMMUNITY
Start: 2018-08-29 | End: 2020-09-22

## 2020-09-22 RX ORDER — FOLIC ACID 1 MG/1
1 TABLET ORAL
Qty: 90 | Refills: 3 | Status: DISCONTINUED | COMMUNITY
Start: 2018-02-05 | End: 2020-09-22

## 2020-09-22 RX ORDER — ACETAZOLAMIDE 125 MG/1
125 TABLET ORAL DAILY
Qty: 30 | Refills: 0 | Status: DISCONTINUED | COMMUNITY
Start: 2019-10-22 | End: 2020-09-22

## 2020-09-22 RX ORDER — CLINDAMYCIN HYDROCHLORIDE 150 MG/1
150 CAPSULE ORAL
Qty: 28 | Refills: 0 | Status: DISCONTINUED | COMMUNITY
Start: 2019-03-18 | End: 2020-09-22

## 2020-09-22 RX ORDER — METRONIDAZOLE 500 MG/1
500 TABLET ORAL
Qty: 6 | Refills: 0 | Status: DISCONTINUED | COMMUNITY
Start: 2019-06-01 | End: 2020-09-22

## 2020-09-22 RX ORDER — FUROSEMIDE 40 MG/1
40 TABLET ORAL
Qty: 90 | Refills: 1 | Status: DISCONTINUED | COMMUNITY
Start: 2017-02-03 | End: 2020-09-22

## 2020-09-22 RX ORDER — TAMSULOSIN HYDROCHLORIDE 0.4 MG/1
0.4 CAPSULE ORAL AT BEDTIME
Qty: 90 | Refills: 0 | Status: DISCONTINUED | COMMUNITY
Start: 2018-02-05 | End: 2020-09-22

## 2020-09-22 NOTE — PHYSICAL EXAM
[No Acute Distress] : no acute distress [Normal Oropharynx] : normal oropharynx [Normal Appearance] : normal appearance [No Neck Mass] : no neck mass [Normal Rate/Rhythm] : normal rate/rhythm [Normal S1, S2] : normal s1, s2 [No Murmurs] : no murmurs [No Resp Distress] : no resp distress [No Abnormalities] : no abnormalities [Benign] : benign [Normal Gait] : normal gait [No Clubbing] : no clubbing [No Cyanosis] : no cyanosis [No Edema] : no edema [FROM] : FROM [Normal Color/ Pigmentation] : normal color/ pigmentation [No Focal Deficits] : no focal deficits [Oriented x3] : oriented x3 [Normal Affect] : normal affect [TextBox_68] : Minimal rhonchi noted in upper airway on inspiration.  [TextBox_105] : Gin BRANDT

## 2020-09-22 NOTE — REASON FOR VISIT
[Follow-Up - From Hospitalization] : a follow-up visit after a recent hospitalization [COPD] : COPD [Shortness of Breath] : shortness of breath [Family Member] : family member

## 2020-09-24 ENCOUNTER — APPOINTMENT (OUTPATIENT)
Dept: FAMILY MEDICINE | Facility: CLINIC | Age: 65
End: 2020-09-24
Payer: MEDICARE

## 2020-09-24 VITALS
DIASTOLIC BLOOD PRESSURE: 62 MMHG | BODY MASS INDEX: 40.43 KG/M2 | OXYGEN SATURATION: 96 % | SYSTOLIC BLOOD PRESSURE: 120 MMHG | TEMPERATURE: 98.6 F | HEIGHT: 69 IN | WEIGHT: 273 LBS | HEART RATE: 92 BPM

## 2020-09-24 DIAGNOSIS — G47.00 INSOMNIA, UNSPECIFIED: ICD-10-CM

## 2020-09-24 DIAGNOSIS — Z23 ENCOUNTER FOR IMMUNIZATION: ICD-10-CM

## 2020-09-24 PROCEDURE — 99495 TRANSJ CARE MGMT MOD F2F 14D: CPT

## 2020-09-24 PROCEDURE — 99214 OFFICE O/P EST MOD 30 MIN: CPT

## 2020-09-24 NOTE — HISTORY OF PRESENT ILLNESS
[Admitted on: ___] : The patient was admitted on [unfilled] [Discharged on ___] : discharged on [unfilled] [Discharge Summary] : discharge summary [FreeTextEntry2] : prolonged hospitalization for CHF exacerabation requiring prolonged intubation\par \par discharded july 8\par [facility] to [facility] ([facility] discharged 9/10/20\par now at [facility]\par presents to office with daughter\par \par hospital info was reviewed\par \par patient feels well\par seroquel is new\par daughter questioning need for medication\par

## 2020-09-30 ENCOUNTER — APPOINTMENT (OUTPATIENT)
Dept: CARDIOLOGY | Facility: CLINIC | Age: 65
End: 2020-09-30
Payer: MEDICARE

## 2020-09-30 ENCOUNTER — NON-APPOINTMENT (OUTPATIENT)
Age: 65
End: 2020-09-30

## 2020-09-30 VITALS
WEIGHT: 273 LBS | DIASTOLIC BLOOD PRESSURE: 71 MMHG | SYSTOLIC BLOOD PRESSURE: 127 MMHG | HEART RATE: 87 BPM | HEIGHT: 72 IN | RESPIRATION RATE: 15 BRPM | BODY MASS INDEX: 36.98 KG/M2

## 2020-09-30 PROCEDURE — 99214 OFFICE O/P EST MOD 30 MIN: CPT | Mod: 25

## 2020-09-30 PROCEDURE — 93000 ELECTROCARDIOGRAM COMPLETE: CPT

## 2020-09-30 NOTE — PHYSICAL EXAM
[General Appearance - Well Developed] : well developed [Normal Appearance] : normal appearance [General Appearance - Well Nourished] : well nourished [General Appearance - In No Acute Distress] : no acute distress [Normal Conjunctiva] : the conjunctiva exhibited no abnormalities [Eyelids - No Xanthelasma] : the eyelids demonstrated no xanthelasmas [Normal Oral Mucosa] : normal oral mucosa [No Oral Pallor] : no oral pallor [No Oral Cyanosis] : no oral cyanosis [JVD Elevated _____cm] : JVD elevated [unfilled] ~U cm above clavicle [Normal Jugular Venous A Waves Present] : normal jugular venous A waves present [No Jugular Venous [place] A Waves] : no jugular venous [place] A waves [Exaggerated Use Of Accessory Muscles For Inspiration] : no accessory muscle use [Respiration, Rhythm And Depth] : normal respiratory rhythm and effort [Heart Sounds] : normal S1 and S2 [Murmurs] : no murmurs present [Abdomen Soft] : soft [Abdomen Tenderness] : non-tender [Abdomen Mass (___ Cm)] : no abdominal mass palpated [Nail Clubbing] : no clubbing of the fingernails [Cyanosis, Localized] : no localized cyanosis [Petechial Hemorrhages (___cm)] : no petechial hemorrhages [] : no ischemic changes [Oriented To Time, Place, And Person] : oriented to person, place, and time [Impaired Insight] : insight and judgment were intact [Auscultation Breath Sounds / Voice Sounds] : lungs were clear to auscultation bilaterally [FreeTextEntry1] : s/p left BKA with LE prosthesis, RLE with edema

## 2020-09-30 NOTE — REVIEW OF SYSTEMS
[see HPI] : see HPI [Negative] : Heme/Lymph [Fever] : no fever [Recent Weight Gain (___ Lbs)] : no recent weight gain [Chills] : no chills [Feeling Fatigued] : not feeling fatigued [Shortness Of Breath] : no shortness of breath [Dyspnea on exertion] : not dyspnea during exertion [Chest Pain] : no chest pain [Lower Ext Edema] : no extremity edema [Palpitations] : no palpitations [Dizziness] : no dizziness [Convulsions] : no convulsions

## 2020-09-30 NOTE — DISCUSSION/SUMMARY
[FreeTextEntry1] : 65 year old gentleman with history of persistent atrial fibrillation on rate control now s/p Watchman device, now off AC, HTN, alcoholism, recurrent falls s/p BKA, CHF with preserved LV function and pulmonary hypertension previously.  Recent hospitalization for resp failure \par -He has prior stroke noted on MRI and high risk of AF related thromboembolism (CHADSVASc =4). Although AC would be ideal, he is a poor candidate given his alcohol abuse and multiple falls and is now s/p Watchman\par - c/w ASA and plavix\par -continue rate control for AF with diltiazem and metoprolol\par - will restart lasix 20mg qd \par - repeat labs\par -TTE 10/29/18 shows low-normal LV function, mild MS, mod-sev MR, sev LAE, sev TR, sev pHTN. \par - Nuclear stress test 12/2018 showed normal myocardial perfusion\par -f/u with me in 4 mnths \par -The described plan was discussed with the pt.  All questions and concerns were addressed to the best of my knowledge. \par

## 2020-09-30 NOTE — HISTORY OF PRESENT ILLNESS
[FreeTextEntry1] : 64 y/o M former smoker who presents today after recent hospitalization for respiratory failure documented as COPD exacerbation in hospital notes but daughter states he had HF as well.  Resp failure requiring prolonged intubation with subsequent trach which is now decannulated.  He was admitted from 04/18/2020 to 07/08/2020.  He now lives at [facility].  He is feeling better since discharge. He still drinks 1-2 times per week, states 1-2 drinks. He has quit smoking!\par He is feeling better overall and has no cardiac complaints - denies CP, SOB, diaphoresis, palpitations, dizziness, syncope, LE edema, PND.  He does note that he sleeps in a recliner for the past 3 years because this is how he breathes the best at night\par  \par He has PMH of persistent atrial fibrillation on rate control, HTN, alcoholism, recurrent falls s/p BKA, and CHF with preserved LV function.  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 He had Watchman device placed and was started on AC.  Shortly after he had GIB requiring several blood transfusions.  He had repeat MYA done which showed well seated Watchman device and no evidence thrombus - AC has since been stopped and he is now only on ASA.\par He follows with Dr Clark \par \par \par \par \par

## 2020-10-14 ENCOUNTER — INPATIENT (INPATIENT)
Facility: HOSPITAL | Age: 65
LOS: 2 days | Discharge: NURSING, REHAB OR LONG-TERM CARE | DRG: 292 | End: 2020-10-17
Attending: FAMILY MEDICINE | Admitting: INTERNAL MEDICINE
Payer: MEDICARE

## 2020-10-14 VITALS
SYSTOLIC BLOOD PRESSURE: 125 MMHG | HEART RATE: 108 BPM | OXYGEN SATURATION: 97 % | DIASTOLIC BLOOD PRESSURE: 67 MMHG | TEMPERATURE: 99 F | HEIGHT: 67 IN | WEIGHT: 261.03 LBS | RESPIRATION RATE: 18 BRPM

## 2020-10-14 DIAGNOSIS — Z89.512 ACQUIRED ABSENCE OF LEFT LEG BELOW KNEE: Chronic | ICD-10-CM

## 2020-10-14 DIAGNOSIS — Z95.818 PRESENCE OF OTHER CARDIAC IMPLANTS AND GRAFTS: Chronic | ICD-10-CM

## 2020-10-14 DIAGNOSIS — S88.119A COMPLETE TRAUMATIC AMPUTATION AT LEVEL BETWEEN KNEE AND ANKLE, UNSPECIFIED LOWER LEG, INITIAL ENCOUNTER: Chronic | ICD-10-CM

## 2020-10-14 LAB
ALBUMIN SERPL ELPH-MCNC: 3.1 G/DL — LOW (ref 3.3–5)
ALP SERPL-CCNC: 161 U/L — HIGH (ref 40–120)
ALT FLD-CCNC: 16 U/L — SIGNIFICANT CHANGE UP (ref 12–78)
ANION GAP SERPL CALC-SCNC: 6 MMOL/L — SIGNIFICANT CHANGE UP (ref 5–17)
APPEARANCE UR: CLEAR — SIGNIFICANT CHANGE UP
AST SERPL-CCNC: 12 U/L — LOW (ref 15–37)
BASOPHILS # BLD AUTO: 0.01 K/UL — SIGNIFICANT CHANGE UP (ref 0–0.2)
BASOPHILS NFR BLD AUTO: 0.2 % — SIGNIFICANT CHANGE UP (ref 0–2)
BILIRUB SERPL-MCNC: 0.7 MG/DL — SIGNIFICANT CHANGE UP (ref 0.2–1.2)
BILIRUB UR-MCNC: NEGATIVE — SIGNIFICANT CHANGE UP
BUN SERPL-MCNC: 16 MG/DL — SIGNIFICANT CHANGE UP (ref 7–23)
CALCIUM SERPL-MCNC: 8.5 MG/DL — SIGNIFICANT CHANGE UP (ref 8.5–10.1)
CHLORIDE SERPL-SCNC: 106 MMOL/L — SIGNIFICANT CHANGE UP (ref 96–108)
CO2 SERPL-SCNC: 28 MMOL/L — SIGNIFICANT CHANGE UP (ref 22–31)
COLOR SPEC: YELLOW — SIGNIFICANT CHANGE UP
CREAT SERPL-MCNC: 0.73 MG/DL — SIGNIFICANT CHANGE UP (ref 0.5–1.3)
DIFF PNL FLD: ABNORMAL
EOSINOPHIL # BLD AUTO: 0.12 K/UL — SIGNIFICANT CHANGE UP (ref 0–0.5)
EOSINOPHIL NFR BLD AUTO: 1.9 % — SIGNIFICANT CHANGE UP (ref 0–6)
GLUCOSE SERPL-MCNC: 103 MG/DL — HIGH (ref 70–99)
GLUCOSE UR QL: NEGATIVE MG/DL — SIGNIFICANT CHANGE UP
HCT VFR BLD CALC: 34.2 % — LOW (ref 39–50)
HGB BLD-MCNC: 10.7 G/DL — LOW (ref 13–17)
IMM GRANULOCYTES NFR BLD AUTO: 0.5 % — SIGNIFICANT CHANGE UP (ref 0–1.5)
KETONES UR-MCNC: NEGATIVE — SIGNIFICANT CHANGE UP
LACTATE SERPL-SCNC: 1.1 MMOL/L — SIGNIFICANT CHANGE UP (ref 0.7–2)
LEUKOCYTE ESTERASE UR-ACNC: NEGATIVE — SIGNIFICANT CHANGE UP
LYMPHOCYTES # BLD AUTO: 0.8 K/UL — LOW (ref 1–3.3)
LYMPHOCYTES # BLD AUTO: 12.4 % — LOW (ref 13–44)
MAGNESIUM SERPL-MCNC: 1.9 MG/DL — SIGNIFICANT CHANGE UP (ref 1.6–2.6)
MCHC RBC-ENTMCNC: 28.8 PG — SIGNIFICANT CHANGE UP (ref 27–34)
MCHC RBC-ENTMCNC: 31.3 GM/DL — LOW (ref 32–36)
MCV RBC AUTO: 91.9 FL — SIGNIFICANT CHANGE UP (ref 80–100)
MONOCYTES # BLD AUTO: 0.52 K/UL — SIGNIFICANT CHANGE UP (ref 0–0.9)
MONOCYTES NFR BLD AUTO: 8 % — SIGNIFICANT CHANGE UP (ref 2–14)
NEUTROPHILS # BLD AUTO: 4.98 K/UL — SIGNIFICANT CHANGE UP (ref 1.8–7.4)
NEUTROPHILS NFR BLD AUTO: 77 % — SIGNIFICANT CHANGE UP (ref 43–77)
NITRITE UR-MCNC: NEGATIVE — SIGNIFICANT CHANGE UP
NT-PROBNP SERPL-SCNC: 3997 PG/ML — HIGH (ref 0–125)
PH UR: 7 — SIGNIFICANT CHANGE UP (ref 5–8)
PLATELET # BLD AUTO: 134 K/UL — LOW (ref 150–400)
POTASSIUM SERPL-MCNC: 3.9 MMOL/L — SIGNIFICANT CHANGE UP (ref 3.5–5.3)
POTASSIUM SERPL-SCNC: 3.9 MMOL/L — SIGNIFICANT CHANGE UP (ref 3.5–5.3)
PROT SERPL-MCNC: 6.9 GM/DL — SIGNIFICANT CHANGE UP (ref 6–8.3)
PROT UR-MCNC: NEGATIVE MG/DL — SIGNIFICANT CHANGE UP
RBC # BLD: 3.72 M/UL — LOW (ref 4.2–5.8)
RBC # FLD: 15.8 % — HIGH (ref 10.3–14.5)
SARS-COV-2 RNA SPEC QL NAA+PROBE: SIGNIFICANT CHANGE UP
SODIUM SERPL-SCNC: 140 MMOL/L — SIGNIFICANT CHANGE UP (ref 135–145)
SP GR SPEC: 1 — LOW (ref 1.01–1.02)
TROPONIN I SERPL-MCNC: <0.015 NG/ML — SIGNIFICANT CHANGE UP (ref 0.01–0.04)
UROBILINOGEN FLD QL: NEGATIVE MG/DL — SIGNIFICANT CHANGE UP
WBC # BLD: 6.46 K/UL — SIGNIFICANT CHANGE UP (ref 3.8–10.5)
WBC # FLD AUTO: 6.46 K/UL — SIGNIFICANT CHANGE UP (ref 3.8–10.5)

## 2020-10-14 PROCEDURE — 93005 ELECTROCARDIOGRAM TRACING: CPT

## 2020-10-14 PROCEDURE — 84484 ASSAY OF TROPONIN QUANT: CPT

## 2020-10-14 PROCEDURE — 90686 IIV4 VACC NO PRSV 0.5 ML IM: CPT

## 2020-10-14 PROCEDURE — 94760 N-INVAS EAR/PLS OXIMETRY 1: CPT

## 2020-10-14 PROCEDURE — 71045 X-RAY EXAM CHEST 1 VIEW: CPT | Mod: 26

## 2020-10-14 PROCEDURE — 94640 AIRWAY INHALATION TREATMENT: CPT

## 2020-10-14 PROCEDURE — 83735 ASSAY OF MAGNESIUM: CPT

## 2020-10-14 PROCEDURE — G0008: CPT

## 2020-10-14 PROCEDURE — 36600 WITHDRAWAL OF ARTERIAL BLOOD: CPT

## 2020-10-14 PROCEDURE — 93970 EXTREMITY STUDY: CPT | Mod: 26

## 2020-10-14 PROCEDURE — 82803 BLOOD GASES ANY COMBINATION: CPT

## 2020-10-14 PROCEDURE — 80048 BASIC METABOLIC PNL TOTAL CA: CPT

## 2020-10-14 PROCEDURE — 36415 COLL VENOUS BLD VENIPUNCTURE: CPT

## 2020-10-14 PROCEDURE — 85025 COMPLETE CBC W/AUTO DIFF WBC: CPT

## 2020-10-14 RX ORDER — METOPROLOL SUCCINATE 25 MG/1
25 TABLET, EXTENDED RELEASE ORAL
Qty: 180 | Refills: 3 | Status: DISCONTINUED | COMMUNITY
Start: 1900-01-01 | End: 2020-10-14

## 2020-10-14 RX ORDER — FUROSEMIDE 40 MG
40 TABLET ORAL ONCE
Refills: 0 | Status: COMPLETED | OUTPATIENT
Start: 2020-10-14 | End: 2020-10-14

## 2020-10-14 RX ORDER — ASPIRIN/CALCIUM CARB/MAGNESIUM 324 MG
162 TABLET ORAL ONCE
Refills: 0 | Status: COMPLETED | OUTPATIENT
Start: 2020-10-14 | End: 2020-10-14

## 2020-10-14 RX ADMIN — Medication 162 MILLIGRAM(S): at 19:31

## 2020-10-14 RX ADMIN — Medication 40 MILLIGRAM(S): at 23:56

## 2020-10-14 NOTE — ED PROVIDER NOTE - MUSCULOSKELETAL, MLM
Spine appears normal, range of motion is not limited, no muscle or joint tenderness. Intact ROM on flexion and extension of R knee, R hip and R ankle, no laxity. +LLE BKA No nuchal rigidity.

## 2020-10-14 NOTE — ED ADULT NURSE NOTE - CHPI ED NUR SYMPTOMS NEG
no chills/no decreased eating/drinking/no fever/no pain/no vomiting/no nausea/no tingling/no weakness/no dizziness

## 2020-10-14 NOTE — ED PROVIDER NOTE - RELIEVING FACTORS
none 19 yo F c/o asthma exacerbation. Pt states when she was out with her friends she began to feel SOB and like she "could not get air in", and then felt a burning in her chest. Pt states she attempted to use her asthma inhaler to no avail. Pt speaking in full complete sentences, no dyspnea noted at this time. Denies N/V/D, headache, dizziness, fever/chills, numbness/tingling, change in bowel or bladder habits. Pt ambulatory with steady gait.

## 2020-10-14 NOTE — ED PROVIDER NOTE - SKIN, MLM
Skin normal color for race, warm, dry and intact. No evidence of rash. +moderate swelling of RLE with small abrasion on his R knee 1x1cm. I

## 2020-10-14 NOTE — ED PROVIDER NOTE - CARDIAC, MLM
Normal rate, regular rhythm.  Heart sounds S1, S2.  No murmurs, rubs or gallops. 2+ pulses of R DP, radial pulses intact b/l, 2+ pulses in L femoral artery. Normal rate, regular rhythm.  Heart sounds S1, S2.  No rubs or gallops. 2+ pulses of R DP, radial pulses intact b/l, 2+ pulses in L femoral artery.

## 2020-10-14 NOTE — ED PROVIDER NOTE - CLINICAL SUMMARY MEDICAL DECISION MAKING FREE TEXT BOX
Patient with a hx of CHF, COPD, cardiac arrest, secondary to trauma, pneumothorax presenting to the ED c/o RLE swelling. Will get basic blood work, Sonogram of RLE to r/o DVT. Offered patient the possibility of admission, states that he does not wish to stay, told patient we will await the results and decide at that point.

## 2020-10-14 NOTE — ED PROVIDER NOTE - NEUROLOGICAL, MLM
Alert and oriented, no focal deficits, no motor or sensory deficits. NIH of 0. GCS of 15. Cranial Nerves II-XII intact.

## 2020-10-14 NOTE — ED PROVIDER NOTE - PROGRESS NOTE DETAILS
Isrrael WHARTON for ED attending, Dr. Leon: Updated patient on the results of labs and imaging. BNP found to be 3000, CXR showed congestion with cardiomegaly. Given symptoms recommend patient to be admitted to the ED for CHF exacerbation workup, and will offer a dose of Lasix.

## 2020-10-14 NOTE — ED ADULT NURSE NOTE - OBJECTIVE STATEMENT
pt came to ED for evaluation of right leg swelling. 3+ pitting edema noted, Left BKA secondary to trauma history. pt sustained abrasion to right knee. right leg is warm, edematous, normal skin color. denies pain. pulse present. pt denies any other pain or complaints.

## 2020-10-14 NOTE — ED PROVIDER NOTE - CARE PLAN
Principal Discharge DX:	Leg swelling  Secondary Diagnosis:	Shortness of breath  Secondary Diagnosis:	Acute on chronic congestive heart failure, unspecified heart failure type   Principal Discharge DX:	Leg swelling  Goal:	swelling of leg, elevated BNP, ALANIZ rule out ACS vs. CHF exacerbation  Secondary Diagnosis:	Shortness of breath  Secondary Diagnosis:	Acute on chronic congestive heart failure, unspecified heart failure type

## 2020-10-14 NOTE — ED PROVIDER NOTE - ENMT, MLM
Airway patent, Nasal mucosa clear. Mouth with normal mucosa. Throat has no vesicles, no oropharyngeal exudates and uvula is midline. Airway patent, Nasal mucosa clear. Mouth with normal mucosa. Throat has no vesicles, no oropharyngeal exudates and uvula is midline. No epistaxis

## 2020-10-14 NOTE — ED PROVIDER NOTE - OBJECTIVE STATEMENT
66 y/o M with PMHx of CHF, COPD, Afib, cardiac arrest, ETOH abuse, GERD, HTN, Neuropathy, PVD, s/p BKA L leg, hemopneumothorax presents to the ED c/o worsening RLE swelling over past few weeks. Patient is currently on 20mg of Lasix daily, went to see PCD who told patient to come to the ED to r/o possible blood clot. Notes that he has also been having mild ALANIZ. Denies CP, abd pain, N/V/D, fever or chills.    PCD: Dr. Yang

## 2020-10-15 DIAGNOSIS — I50.9 HEART FAILURE, UNSPECIFIED: ICD-10-CM

## 2020-10-15 DIAGNOSIS — I10 ESSENTIAL (PRIMARY) HYPERTENSION: ICD-10-CM

## 2020-10-15 DIAGNOSIS — Z98.890 OTHER SPECIFIED POSTPROCEDURAL STATES: Chronic | ICD-10-CM

## 2020-10-15 DIAGNOSIS — Z93.1 GASTROSTOMY STATUS: Chronic | ICD-10-CM

## 2020-10-15 DIAGNOSIS — J44.9 CHRONIC OBSTRUCTIVE PULMONARY DISEASE, UNSPECIFIED: ICD-10-CM

## 2020-10-15 DIAGNOSIS — I48.91 UNSPECIFIED ATRIAL FIBRILLATION: ICD-10-CM

## 2020-10-15 DIAGNOSIS — M79.89 OTHER SPECIFIED SOFT TISSUE DISORDERS: ICD-10-CM

## 2020-10-15 LAB
ANION GAP SERPL CALC-SCNC: 4 MMOL/L — LOW (ref 5–17)
BASE EXCESS BLDA CALC-SCNC: 3.4 MMOL/L — HIGH (ref -2–2)
BASOPHILS # BLD AUTO: 0.03 K/UL — SIGNIFICANT CHANGE UP (ref 0–0.2)
BASOPHILS NFR BLD AUTO: 0.5 % — SIGNIFICANT CHANGE UP (ref 0–2)
BLOOD GAS COMMENTS ARTERIAL: SIGNIFICANT CHANGE UP
BUN SERPL-MCNC: 14 MG/DL — SIGNIFICANT CHANGE UP (ref 7–23)
CALCIUM SERPL-MCNC: 8.4 MG/DL — LOW (ref 8.5–10.1)
CHLORIDE SERPL-SCNC: 106 MMOL/L — SIGNIFICANT CHANGE UP (ref 96–108)
CO2 SERPL-SCNC: 31 MMOL/L — SIGNIFICANT CHANGE UP (ref 22–31)
CREAT SERPL-MCNC: 0.72 MG/DL — SIGNIFICANT CHANGE UP (ref 0.5–1.3)
CULTURE RESULTS: NO GROWTH — SIGNIFICANT CHANGE UP
EOSINOPHIL # BLD AUTO: 0.18 K/UL — SIGNIFICANT CHANGE UP (ref 0–0.5)
EOSINOPHIL NFR BLD AUTO: 2.8 % — SIGNIFICANT CHANGE UP (ref 0–6)
GLUCOSE SERPL-MCNC: 102 MG/DL — HIGH (ref 70–99)
HCO3 BLDA-SCNC: 28 MMOL/L — SIGNIFICANT CHANGE UP (ref 21–29)
HCT VFR BLD CALC: 35.1 % — LOW (ref 39–50)
HGB BLD-MCNC: 11 G/DL — LOW (ref 13–17)
IMM GRANULOCYTES NFR BLD AUTO: 0.3 % — SIGNIFICANT CHANGE UP (ref 0–1.5)
LYMPHOCYTES # BLD AUTO: 0.99 K/UL — LOW (ref 1–3.3)
LYMPHOCYTES # BLD AUTO: 15.3 % — SIGNIFICANT CHANGE UP (ref 13–44)
MAGNESIUM SERPL-MCNC: 2.1 MG/DL — SIGNIFICANT CHANGE UP (ref 1.6–2.6)
MCHC RBC-ENTMCNC: 28.7 PG — SIGNIFICANT CHANGE UP (ref 27–34)
MCHC RBC-ENTMCNC: 31.3 GM/DL — LOW (ref 32–36)
MCV RBC AUTO: 91.6 FL — SIGNIFICANT CHANGE UP (ref 80–100)
MONOCYTES # BLD AUTO: 0.64 K/UL — SIGNIFICANT CHANGE UP (ref 0–0.9)
MONOCYTES NFR BLD AUTO: 9.9 % — SIGNIFICANT CHANGE UP (ref 2–14)
NEUTROPHILS # BLD AUTO: 4.6 K/UL — SIGNIFICANT CHANGE UP (ref 1.8–7.4)
NEUTROPHILS NFR BLD AUTO: 71.2 % — SIGNIFICANT CHANGE UP (ref 43–77)
PCO2 BLDA: 44 MMHG — SIGNIFICANT CHANGE UP (ref 32–46)
PH BLDA: 7.41 — SIGNIFICANT CHANGE UP (ref 7.35–7.45)
PLATELET # BLD AUTO: 142 K/UL — LOW (ref 150–400)
PO2 BLDA: 65 MMHG — LOW (ref 74–108)
POTASSIUM SERPL-MCNC: 3.5 MMOL/L — SIGNIFICANT CHANGE UP (ref 3.5–5.3)
POTASSIUM SERPL-SCNC: 3.5 MMOL/L — SIGNIFICANT CHANGE UP (ref 3.5–5.3)
RBC # BLD: 3.83 M/UL — LOW (ref 4.2–5.8)
RBC # FLD: 15.9 % — HIGH (ref 10.3–14.5)
SAO2 % BLDA: 92 % — SIGNIFICANT CHANGE UP (ref 92–96)
SARS-COV-2 IGG SERPL QL IA: NEGATIVE — SIGNIFICANT CHANGE UP
SARS-COV-2 IGM SERPL IA-ACNC: <3.8 AU/ML — SIGNIFICANT CHANGE UP
SODIUM SERPL-SCNC: 141 MMOL/L — SIGNIFICANT CHANGE UP (ref 135–145)
SPECIMEN SOURCE: SIGNIFICANT CHANGE UP
TROPONIN I SERPL-MCNC: <0.015 NG/ML — SIGNIFICANT CHANGE UP (ref 0.01–0.04)
WBC # BLD: 6.46 K/UL — SIGNIFICANT CHANGE UP (ref 3.8–10.5)
WBC # FLD AUTO: 6.46 K/UL — SIGNIFICANT CHANGE UP (ref 3.8–10.5)

## 2020-10-15 PROCEDURE — 93010 ELECTROCARDIOGRAM REPORT: CPT

## 2020-10-15 PROCEDURE — 99222 1ST HOSP IP/OBS MODERATE 55: CPT

## 2020-10-15 PROCEDURE — 12345: CPT | Mod: NC

## 2020-10-15 PROCEDURE — 99223 1ST HOSP IP/OBS HIGH 75: CPT

## 2020-10-15 RX ORDER — INFLUENZA VIRUS VACCINE 15; 15; 15; 15 UG/.5ML; UG/.5ML; UG/.5ML; UG/.5ML
0.5 SUSPENSION INTRAMUSCULAR ONCE
Refills: 0 | Status: COMPLETED | OUTPATIENT
Start: 2020-10-15 | End: 2020-10-17

## 2020-10-15 RX ORDER — TIOTROPIUM BROMIDE 18 UG/1
1 CAPSULE ORAL; RESPIRATORY (INHALATION) DAILY
Refills: 0 | Status: DISCONTINUED | OUTPATIENT
Start: 2020-10-15 | End: 2020-10-17

## 2020-10-15 RX ORDER — BUDESONIDE AND FORMOTEROL FUMARATE DIHYDRATE 160; 4.5 UG/1; UG/1
2 AEROSOL RESPIRATORY (INHALATION)
Refills: 0 | Status: DISCONTINUED | OUTPATIENT
Start: 2020-10-15 | End: 2020-10-17

## 2020-10-15 RX ORDER — GABAPENTIN 400 MG/1
400 CAPSULE ORAL THREE TIMES A DAY
Refills: 0 | Status: DISCONTINUED | OUTPATIENT
Start: 2020-10-15 | End: 2020-10-17

## 2020-10-15 RX ORDER — DILTIAZEM HCL 120 MG
120 CAPSULE, EXT RELEASE 24 HR ORAL DAILY
Refills: 0 | Status: DISCONTINUED | OUTPATIENT
Start: 2020-10-15 | End: 2020-10-17

## 2020-10-15 RX ORDER — ENOXAPARIN SODIUM 100 MG/ML
40 INJECTION SUBCUTANEOUS DAILY
Refills: 0 | Status: DISCONTINUED | OUTPATIENT
Start: 2020-10-15 | End: 2020-10-17

## 2020-10-15 RX ORDER — PANTOPRAZOLE SODIUM 20 MG/1
40 TABLET, DELAYED RELEASE ORAL
Refills: 0 | Status: DISCONTINUED | OUTPATIENT
Start: 2020-10-15 | End: 2020-10-17

## 2020-10-15 RX ORDER — DOXAZOSIN MESYLATE 4 MG
2 TABLET ORAL AT BEDTIME
Refills: 0 | Status: DISCONTINUED | OUTPATIENT
Start: 2020-10-15 | End: 2020-10-17

## 2020-10-15 RX ORDER — QUETIAPINE FUMARATE 200 MG/1
50 TABLET, FILM COATED ORAL AT BEDTIME
Refills: 0 | Status: DISCONTINUED | OUTPATIENT
Start: 2020-10-15 | End: 2020-10-17

## 2020-10-15 RX ORDER — METOPROLOL TARTRATE 50 MG
25 TABLET ORAL DAILY
Refills: 0 | Status: DISCONTINUED | OUTPATIENT
Start: 2020-10-15 | End: 2020-10-15

## 2020-10-15 RX ORDER — METOPROLOL TARTRATE 50 MG
25 TABLET ORAL
Refills: 0 | Status: DISCONTINUED | OUTPATIENT
Start: 2020-10-16 | End: 2020-10-17

## 2020-10-15 RX ORDER — THIAMINE MONONITRATE (VIT B1) 100 MG
100 TABLET ORAL DAILY
Refills: 0 | Status: DISCONTINUED | OUTPATIENT
Start: 2020-10-15 | End: 2020-10-17

## 2020-10-15 RX ORDER — SENNA PLUS 8.6 MG/1
2 TABLET ORAL AT BEDTIME
Refills: 0 | Status: DISCONTINUED | OUTPATIENT
Start: 2020-10-15 | End: 2020-10-17

## 2020-10-15 RX ORDER — DOXAZOSIN MESYLATE 4 MG
1 TABLET ORAL
Qty: 0 | Refills: 0 | DISCHARGE

## 2020-10-15 RX ORDER — ASPIRIN/CALCIUM CARB/MAGNESIUM 324 MG
81 TABLET ORAL DAILY
Refills: 0 | Status: DISCONTINUED | OUTPATIENT
Start: 2020-10-15 | End: 2020-10-17

## 2020-10-15 RX ORDER — DOXAZOSIN MESYLATE 4 MG
2 TABLET ORAL AT BEDTIME
Refills: 0 | Status: DISCONTINUED | OUTPATIENT
Start: 2020-10-15 | End: 2020-10-15

## 2020-10-15 RX ORDER — DILTIAZEM HCL 120 MG
1 CAPSULE, EXT RELEASE 24 HR ORAL
Qty: 0 | Refills: 0 | DISCHARGE

## 2020-10-15 RX ORDER — DOXAZOSIN MESYLATE 4 MG
1 TABLET ORAL AT BEDTIME
Refills: 0 | Status: DISCONTINUED | OUTPATIENT
Start: 2020-10-15 | End: 2020-10-15

## 2020-10-15 RX ORDER — METOPROLOL TARTRATE 50 MG
1 TABLET ORAL
Qty: 0 | Refills: 0 | DISCHARGE

## 2020-10-15 RX ORDER — FUROSEMIDE 40 MG
40 TABLET ORAL DAILY
Refills: 0 | Status: DISCONTINUED | OUTPATIENT
Start: 2020-10-15 | End: 2020-10-17

## 2020-10-15 RX ORDER — ALBUTEROL 90 UG/1
2 AEROSOL, METERED ORAL EVERY 6 HOURS
Refills: 0 | Status: DISCONTINUED | OUTPATIENT
Start: 2020-10-15 | End: 2020-10-17

## 2020-10-15 RX ORDER — POLYETHYLENE GLYCOL 3350 17 G/17G
17 POWDER, FOR SOLUTION ORAL DAILY
Refills: 0 | Status: DISCONTINUED | OUTPATIENT
Start: 2020-10-15 | End: 2020-10-17

## 2020-10-15 RX ORDER — CLOPIDOGREL BISULFATE 75 MG/1
75 TABLET, FILM COATED ORAL DAILY
Refills: 0 | Status: DISCONTINUED | OUTPATIENT
Start: 2020-10-15 | End: 2020-10-17

## 2020-10-15 RX ADMIN — PANTOPRAZOLE SODIUM 40 MILLIGRAM(S): 20 TABLET, DELAYED RELEASE ORAL at 05:56

## 2020-10-15 RX ADMIN — Medication 100 MILLIGRAM(S): at 10:27

## 2020-10-15 RX ADMIN — Medication 10 MILLIGRAM(S): at 21:02

## 2020-10-15 RX ADMIN — ALBUTEROL 2 PUFF(S): 90 AEROSOL, METERED ORAL at 08:43

## 2020-10-15 RX ADMIN — Medication 25 MILLIGRAM(S): at 10:27

## 2020-10-15 RX ADMIN — Medication 40 MILLIGRAM(S): at 10:27

## 2020-10-15 RX ADMIN — ALBUTEROL 2 PUFF(S): 90 AEROSOL, METERED ORAL at 14:01

## 2020-10-15 RX ADMIN — GABAPENTIN 400 MILLIGRAM(S): 400 CAPSULE ORAL at 05:56

## 2020-10-15 RX ADMIN — ENOXAPARIN SODIUM 40 MILLIGRAM(S): 100 INJECTION SUBCUTANEOUS at 10:28

## 2020-10-15 RX ADMIN — BUDESONIDE AND FORMOTEROL FUMARATE DIHYDRATE 2 PUFF(S): 160; 4.5 AEROSOL RESPIRATORY (INHALATION) at 20:26

## 2020-10-15 RX ADMIN — SENNA PLUS 2 TABLET(S): 8.6 TABLET ORAL at 21:02

## 2020-10-15 RX ADMIN — GABAPENTIN 400 MILLIGRAM(S): 400 CAPSULE ORAL at 21:01

## 2020-10-15 RX ADMIN — Medication 2 MILLIGRAM(S): at 21:02

## 2020-10-15 RX ADMIN — Medication 120 MILLIGRAM(S): at 10:26

## 2020-10-15 RX ADMIN — QUETIAPINE FUMARATE 50 MILLIGRAM(S): 200 TABLET, FILM COATED ORAL at 21:02

## 2020-10-15 RX ADMIN — Medication 10 MILLIGRAM(S): at 10:26

## 2020-10-15 RX ADMIN — Medication 81 MILLIGRAM(S): at 10:27

## 2020-10-15 RX ADMIN — CLOPIDOGREL BISULFATE 75 MILLIGRAM(S): 75 TABLET, FILM COATED ORAL at 10:27

## 2020-10-15 RX ADMIN — TIOTROPIUM BROMIDE 1 CAPSULE(S): 18 CAPSULE ORAL; RESPIRATORY (INHALATION) at 10:09

## 2020-10-15 RX ADMIN — GABAPENTIN 400 MILLIGRAM(S): 400 CAPSULE ORAL at 13:10

## 2020-10-15 RX ADMIN — ALBUTEROL 2 PUFF(S): 90 AEROSOL, METERED ORAL at 20:30

## 2020-10-15 RX ADMIN — Medication 1 TABLET(S): at 10:27

## 2020-10-15 NOTE — PROGRESS NOTE ADULT - ASSESSMENT
10/15 - RLE pain and edema with SOB /cough  secondary to/Acute on chronic diastolic heart failure   hx admission april 2020 s/p intubation and trach , was in rehab until 9/20/20, trach removal  hx afib/watchman device /HTN  COPD  CVA  Left BKA  - on IV lasix , monitor BMP  cardio and pulm consults       66 yo male with PMH of diastolic CHF (LVEF 55-60% 5/2020), a. fib s/p watchman device, obesity, COPD, HTN, neuropathy, s/p left BKA, h/o CVA, h/o trach s/p removal, h/o etoh abuse, h/o pneumothorax, h/o cardiac arrest presents with:        #acute diastolic CHF exacerbation  - place in observation in med-surg  - diuresis with IV lasix 40mg daily  - MYA from 5/2020 with LVEF 55-60%, moderate MR and TR  - continue metoprolol  - strict I/Os, daily weights  - cardio consult  - PBNP 3997  - CXR with vascular congestion  - doppler negative for DVT  - COVID negative     #Chronic a. fib s/p watchmans device  - continue asa and plavix  - continue cardizem and toprol    #COPD - stable  - continue symbicort, spiriva, albuterol    #HTN   - continue cardizem and toprol#DVT prophylaxis  - lovenox    please verify med-rec with daughter in AM    IMPROVE VTE Individual Risk Assessment    RISK                                                                Points    [  ] Previous VTE                                                  3    [  ] Thrombophilia                                               2    [  ] Lower limb paralysis                                      2        (unable to hold up >15 seconds)      [  ] Current Cancer                                              2         (within 6 months)    [  ] Immobilization > 24 hrs                                1    [  ] ICU/CCU stay > 24 hours                              1    [x  ] Age > 60                                                      1    IMPROVE VTE Score 1________

## 2020-10-15 NOTE — H&P ADULT - HISTORY OF PRESENT ILLNESS
66 yo male with PMH of diastolic CHF (LVEF 55-60% 5/2020), a. fib s/p watchman device, obesity, COPD, HTN, neuropathy, s/p left BKA, h/o CVA, h/o trach s/p removal, h/o etoh abuse, h/o pneumothorax, h/o cardiac arrest presents to ED with complaint of RLE edema and pain. Pt with history of admission to  in April-June for respiratory failure requiring intubation and trach placement and then was at [facility] until 9/20/20. Pt states over the last 24 hours he has noted to have increasing swelling of his RLE. States feels like it is burning and tender to touch. He denies any SOB. Does have a non productive cough which he states he has had for a few days now. No fevers. Denies any headaches, dizziness, CP, palpitations, abd pain, N/V/D.     In the ED doppler negative for DVT. Noted with BNP 3997. CXR with vascular congestion. He was given lasix 40mg IV. 66 yo Male with PMH of diastolic CHF (LVEF 55-60% 5/2020), a. fib s/p watchman device, obesity, COPD, HTN, neuropathy, s/p left BKA, h/o CVA, h/o trach s/p removal, h/o etoh abuse, h/o pneumothorax, h/o cardiac arrest presents to ED with complaint of RLE edema and pain. Pt with history of admission to  in April-June for respiratory failure requiring intubation and trach placement and then was at [facility] until 9/20/20. Patient states over the last 24 hours he has noted to have increasing swelling of his RLE. States feels like it is burning and tender to touch. He denies any SOB. Does have a non productive cough which he states he has had for a few days now. No fevers. Denies any headaches, dizziness, CP, palpitations, abd pain, N/V/D.     In the ED doppler negative for DVT. Noted with BNP 3997. CXR with vascular congestion. He was given lasix 40mg IV.

## 2020-10-15 NOTE — PROGRESS NOTE ADULT - SUBJECTIVE AND OBJECTIVE BOX
66 yo Male with PMH of diastolic CHF (LVEF 55-60% 2020), a. fib s/p watchman device, obesity, COPD, HTN, neuropathy, s/p left BKA, h/o CVA, h/o trach s/p removal, h/o etoh abuse, h/o pneumothorax, h/o cardiac arrest presents to ED with complaint of RLE edema and pain. Pt with history of admission to  in April- for respiratory failure requiring intubation and trach placement and then was at [facility] until 20. Patient states over the last 24 hours he has noted to have increasing swelling of his RLE. States feels like it is burning and tender to touch. He denies any SOB. Does have a non productive cough which he states he has had for a few days now. No fevers. Denies any headaches, dizziness, CP, palpitations, abd pain, N/V/D. In the ED doppler negative for DVT. Noted with BNP 3997. CXR with vascular congestion. He was given lasix 40mg IV.     10/15 afebrile, has some cough , no SOB at rest, no cp      PHYSICAL EXAM:    Daily Height in cm: 170.18 (14 Oct 2020 18:05)    Daily     ICU Vital Signs Last 24 Hrs  T(C): 36.5 (15 Oct 2020 08:19), Max: 37.2 (14 Oct 2020 18:05)  T(F): 97.7 (15 Oct 2020 08:19), Max: 98.9 (14 Oct 2020 18:05)  HR: 84 (15 Oct 2020 08:19) (82 - 108)  BP: 144/77 (15 Oct 2020 08:19) (120/66 - 144/77)  BP(mean): 84 (15 Oct 2020 00:03) (84 - 84)  ABP: --  ABP(mean): --  RR: 18 (15 Oct 2020 08:19) (18 - 20)  SpO2: 91% (15 Oct 2020 08:19) (91% - 100%)      Constitutional: Well appearing  HEENT: Atraumatic, JOE, Normal, No congestion  Respiratory:inspiratory  crackles B/l lung bases  Cardiovascular: N S1S2;   Gastrointestinal: Abdomen soft, non tender, Bowel Ssounds present  Extremities:RLE edema and tenderness to palpation, left BKA  Neurological: AAO x 3, no gross focal motor deficits                            11.0   6.46  )-----------( 142      ( 15 Oct 2020 06:16 )             35.1       CBC Full  -  ( 15 Oct 2020 06:16 )  WBC Count : 6.46 K/uL  RBC Count : 3.83 M/uL  Hemoglobin : 11.0 g/dL  Hematocrit : 35.1 %  Platelet Count - Automated : 142 K/uL  Mean Cell Volume : 91.6 fl  Mean Cell Hemoglobin : 28.7 pg  Mean Cell Hemoglobin Concentration : 31.3 gm/dL  Auto Neutrophil # : 4.60 K/uL  Auto Lymphocyte # : 0.99 K/uL  Auto Monocyte # : 0.64 K/uL  Auto Eosinophil # : 0.18 K/uL  Auto Basophil # : 0.03 K/uL  Auto Neutrophil % : 71.2 %  Auto Lymphocyte % : 15.3 %  Auto Monocyte % : 9.9 %  Auto Eosinophil % : 2.8 %  Auto Basophil % : 0.5 %      10-15    141  |  106  |  14  ----------------------------<  102<H>  3.5   |  31  |  0.72    Ca    8.4<L>      15 Oct 2020 06:16  Mg     2.1     10-15    TPro  6.9  /  Alb  3.1<L>  /  TBili  0.7  /  DBili  x   /  AST  12<L>  /  ALT  16  /  AlkPhos  161<H>  10-14      LIVER FUNCTIONS - ( 14 Oct 2020 19:13 )  Alb: 3.1 g/dL / Pro: 6.9 gm/dL / ALK PHOS: 161 U/L / ALT: 16 U/L / AST: 12 U/L / GGT: x                 CARDIAC MARKERS ( 14 Oct 2020 19:13 )  <0.015 ng/mL / x     / x     / x     / x      CARDIAC MARKERS ( 14 Oct 2020 00:35 )  <0.015 ng/mL / x     / x     / x     / x            Urinalysis Basic - ( 14 Oct 2020 19:13 )    Color: Yellow / Appearance: Clear / S.005 / pH: x  Gluc: x / Ketone: Negative  / Bili: Negative / Urobili: Negative mg/dL   Blood: x / Protein: Negative mg/dL / Nitrite: Negative   Leuk Esterase: Negative / RBC: 11-25 /HPF / WBC Negative   Sq Epi: x / Non Sq Epi: Occasional / Bacteria: Negative        ABG - ( 15 Oct 2020 01:09 )  pH, Arterial: 7.41  pH, Blood: x     /  pCO2: 44    /  pO2: 65    / HCO3: 28    / Base Excess: 3.4   /  SaO2: 92                  MEDICATIONS  (STANDING):  ALBUTerol    90 MICROgram(s) HFA Inhaler 2 Puff(s) Inhalation every 6 hours  aspirin  chewable 81 milliGRAM(s) Oral daily  budesonide 160 MICROgram(s)/formoterol 4.5 MICROgram(s) Inhaler 2 Puff(s) Inhalation two times a day  busPIRone 10 milliGRAM(s) Oral two times a day  clopidogrel Tablet 75 milliGRAM(s) Oral daily  diltiazem    milliGRAM(s) Oral daily  doxazosin 1 milliGRAM(s) Oral at bedtime  enoxaparin Injectable 40 milliGRAM(s) SubCutaneous daily  furosemide   Injectable 40 milliGRAM(s) IV Push daily  gabapentin 400 milliGRAM(s) Oral three times a day  influenza   Vaccine 0.5 milliLiter(s) IntraMuscular once  metoprolol succinate ER 25 milliGRAM(s) Oral daily  multivitamin 1 Tablet(s) Oral daily  pantoprazole    Tablet 40 milliGRAM(s) Oral before breakfast  QUEtiapine 50 milliGRAM(s) Oral at bedtime  senna 2 Tablet(s) Oral at bedtime  thiamine 100 milliGRAM(s) Oral daily  tiotropium 18 MICROgram(s) Capsule 1 Capsule(s) Inhalation daily

## 2020-10-15 NOTE — CONSULT NOTE ADULT - PROBLEM SELECTOR RECOMMENDATION 2
Stable  s/p Watchman device  Continue Toprol 25 QD, Dilitiazem 120 mg QD Satisfactory rate control; continue metoprolol, diltiazem.  He is s/p Watchman device

## 2020-10-15 NOTE — H&P ADULT - NSHPPHYSICALEXAM_GEN_ALL_CORE
Vital Signs Last 24 Hrs  T(C): 36.8 (15 Oct 2020 00:03), Max: 37.2 (14 Oct 2020 18:05)  T(F): 98.2 (15 Oct 2020 00:03), Max: 98.9 (14 Oct 2020 18:05)  HR: 82 (15 Oct 2020 00:03) (82 - 108)  BP: 130/69 (15 Oct 2020 00:03) (120/66 - 130/69)  BP(mean): 84 (15 Oct 2020 00:03) (84 - 84)  RR: 20 (14 Oct 2020 19:49) (18 - 20)  SpO2: 94% (15 Oct 2020 00:03) (94% - 100%)    Pt is A+Ox3, NAD, resting comfortably  Left BKA noted, edema noted to RLE  obese  pt with mild dysarthria which is chronic  old trach site noted  full physical exam to be performed by bedside hospitalist

## 2020-10-15 NOTE — CONSULT NOTE ADULT - PROBLEM SELECTOR RECOMMENDATION 9
CXR findings on admission noted  BNP on admission: >3000  Last ECHO MYA 5/2020 details noted above: EF 55-60%  Can Continue IV lasix 40 for now   Strict I/O's  Monitor daily weights Acute/chronic diastolic HF -- mild exacerbation; diurese with IV Lasix while monitoring renal function; continue metoprolol.

## 2020-10-15 NOTE — CONSULT NOTE ADULT - ASSESSMENT
65 y.o.m with PMH of diastolic CHF (LVEF 55-60% 5/2020), a. fib s/p watchman device, obesity, COPD, HTN, neuropathy, s/p left BKA, h/o CVA, h/o trach s/p removal, h/o etoh abuse, h/o pneumothorax, h/o cardiac arrest admitted with  RLE pain and edema with SOB /cough  secondary to/Acute on chronic diastolic heart failure, hx afib/watchman device /HTN    PROBLEMS;    Acute diastolic CHF exacerbation  Chronic a. fib s/p watchmans device  COPD  Mild interstitial lung disease-NSIP h/o smoking  Pulmonary hypertension  HTN   s/p staph bactermia/sputum pseudomonas/staph  Hypercapnic & hypoxamic respiratory failure-s/p trach & PEG  sputum-Few Pseudomonas aeruginosa (Carbapenem Resistant)/Moderate Methicillin resistant Staphylococcus aureus  O[facility]/VIJAY  COVID negative  Pulmonary hypertension  h/o stone in the left ureterovesicular junction/stone in the lower pole right kidney.  h/o Subacute hemorrhage in the right rectus abdominis along the anterior sheath  Cirrhosis  Mild splenomegaly-portal venous hypertension.  BPH  GERD  ETOH abuse  seizures disorder  H/o HTN    PLAN;    pat better  keep neg balance monitor lytes  aerosols  Sleep study as outpat  need PFT as outpat with h/o trach  supportive care  dvt prophylasix
64 yo Male with PMH of diastolic CHF (LVEF 55-60% 5/2020), atrial fibrillation  s/p watchman device, obesity, COPD, HTN, neuropathy, s/p left BKA, h/o CVA, h/o trach s/p removal, h/o etoh abuse, h/o pneumothorax, h/o cardiac arrest who presented to the hospital on 10/14/20 for 1 day history of worsening right lower leg swelling, found to be in acute CHF exacerbation for which he was admitted:

## 2020-10-15 NOTE — H&P ADULT - NSICDXPASTSURGICALHX_GEN_ALL_CORE_FT
PAST SURGICAL HISTORY:  H/O tracheostomy now removed    Presence of Watchman left atrial appendage closure device     S/P BKA (below knee amputation) unilateral, left     S/P percutaneous endoscopic gastrostomy (PEG) tube placement now removed    Unilateral amputation of lower extremity below knee

## 2020-10-15 NOTE — CONSULT NOTE ADULT - SUBJECTIVE AND OBJECTIVE BOX
64 yo Male with PMH of diastolic CHF (LVEF 55-60% 5/2020), atrial fibrillation  s/p watchman device, obesity, COPD, HTN, neuropathy, s/p left BKA, h/o CVA, h/o trach s/p removal, h/o etoh abuse, h/o pneumothorax, h/o cardiac arrest who presented to the hospital on 10/14/20 for 1 day history of worsening right lower leg swelling with associated pain to touch. On presentation to the ED, found to have RLE edema elevated BNP and CXR findings of pulmonary congestion suggestive of an Acute CHF exacerbation for which Cardiology team has been consulted.    Patient was seen and examined this AM. Endorses history at time of presentation. States that he sees Dr. Daley Cardiology. He told his cardiologist that he felt he needed a stronger dose of his water pill because he had been feeling more short of breath with exertion, not short breath at rest. He 66 yo Male with PMH of diastolic CHF (LVEF 55-60% 5/2020), atrial fibrillation  s/p watchman device, obesity, COPD, HTN, neuropathy, s/p left BKA, h/o CVA, h/o trach s/p removal, h/o etoh abuse, h/o pneumothorax, h/o cardiac arrest who presented to the hospital on 10/14/20 for 1 day history of worsening right lower leg swelling with associated pain. On presentation to the ED, found to have RLE edema elevated BNP and CXR findings of pulmonary congestion suggestive of an Acute CHF exacerbation for which Cardiology team has been consulted.    Patient was seen and examined this AM. Endorses history at time of presentation. States that he sees Dr. Daley Cardiology. He told his cardiologist that he felt he needed a stronger dose of his water pill because he had been feeling more short of breath with exertion, not short breath at rest. Denies any changes in diet: no increased salt intake.  He states that he has always had LE extremity phantom pain related to his Left BKA, but he noticed increased swelling of the RLE. He denies any chest pain, chest palpitations, headache.      PHMx:   Atrial Fibrillation  CHF last ECHO MYA 5/2020: EF 55-60%  Anemia  Alcohol Abuse/Withdrawl  GERD  COPD  Emphysema  Chronic LE pain  Cataracts B/L    Surgical History:   H/O tracheostomy now removed  Presence of Watchman left atrial appendage closure device   S/P BKA (below knee amputation) unilateral, left   S/P percutaneous endoscopic gastrostomy (PEG) tube placement now removed  Unilateral amputation of lower extremity below knee.       Social Hx:  Previous tobacco use, previous ETOH abuse, denies illicit drug use    FHx: Unknown    REVIEW OF SYSTEMS:  CONSTITUTIONAL: No fevers or chills  EYES/ENT: No visual changes;  No vertigo or throat pain   NECK: No pain or stiffness  RESPIRATORY: +SOB on exertion  CARDIOVASCULAR: No chest pain or palpitations  GASTROINTESTINAL: denies abdominal pain  All other review of systems is negative unless indicated above    Vital Signs Last 24 Hrs  T(C): 36.5 (15 Oct 2020 08:19), Max: 37.2 (14 Oct 2020 18:05)  T(F): 97.7 (15 Oct 2020 08:19), Max: 98.9 (14 Oct 2020 18:05)  HR: 84 (15 Oct 2020 08:40) (82 - 108)  BP: 144/77 (15 Oct 2020 08:19) (120/66 - 144/77)  BP(mean): 84 (15 Oct 2020 00:03) (84 - 84)  RR: 18 (15 Oct 2020 08:19) (18 - 20)  SpO2: 91% (15 Oct 2020 08:19) (91% - 100%)    I&O's Summary    15 Oct 2020 07:01  -  15 Oct 2020 12:09  --------------------------------------------------------  IN: 360 mL / OUT: 1200 mL / NET: -840 mL      PHYSICAL EXAM:  Constitutional: NAD, awake and alert, well-developed  Respiratory: mild diffuse rales  Cardiovascular: S1 and S2, regular rate and rhythm, no Murmurs, gallops or rubs  Gastrointestinal: Bowel Sounds present, soft, nontender  Extremities: +2 edema of RLE, warm to touch, mild erythema, non tender, L BKA noted  Vascular: 2+ peripheral pulses  Neurological: A/O x 3, no focal deficits      MEDICATIONS:  MEDICATIONS  (STANDING):  ALBUTerol    90 MICROgram(s) HFA Inhaler 2 Puff(s) Inhalation every 6 hours  aspirin  chewable 81 milliGRAM(s) Oral daily  budesonide 160 MICROgram(s)/formoterol 4.5 MICROgram(s) Inhaler 2 Puff(s) Inhalation two times a day  busPIRone 10 milliGRAM(s) Oral two times a day  clopidogrel Tablet 75 milliGRAM(s) Oral daily  diltiazem    milliGRAM(s) Oral daily  doxazosin 1 milliGRAM(s) Oral at bedtime  enoxaparin Injectable 40 milliGRAM(s) SubCutaneous daily  furosemide   Injectable 40 milliGRAM(s) IV Push daily  gabapentin 400 milliGRAM(s) Oral three times a day  influenza   Vaccine 0.5 milliLiter(s) IntraMuscular once  metoprolol succinate ER 25 milliGRAM(s) Oral daily  multivitamin 1 Tablet(s) Oral daily  pantoprazole    Tablet 40 milliGRAM(s) Oral before breakfast  QUEtiapine 50 milliGRAM(s) Oral at bedtime  senna 2 Tablet(s) Oral at bedtime  thiamine 100 milliGRAM(s) Oral daily  tiotropium 18 MICROgram(s) Capsule 1 Capsule(s) Inhalation daily      LABS: All Labs Reviewed:                        11.0   6.46  )-----------( 142      ( 15 Oct 2020 06:16 )             35.1     10-15    141  |  106  |  14  ----------------------------<  102<H>  3.5   |  31  |  0.72    Ca    8.4<L>      15 Oct 2020 06:16  Mg     2.1     10-15    TPro  6.9  /  Alb  3.1<L>  /  TBili  0.7  /  DBili  x   /  AST  12<L>  /  ALT  16  /  AlkPhos  161<H>  10-14      CARDIAC MARKERS ( 14 Oct 2020 19:13 )  <0.015 ng/mL / x     / x     / x     / x      CARDIAC MARKERS ( 14 Oct 2020 00:35 )  <0.015 ng/mL / x     / x     / x     / x          < from: MYA Complete w/Spect and Color (05.26.20 @ 13:17) >   Impression     Summary     The left ventricle is normal in size, wall thickness,wall motion and   contractility. No regional wall motion abnormalities. EF=55-60%.   No evidence of vegetation on cardiac valves or structures. No evidence of   vegetation on left atrial appendage occlusion device.   Moderate tricuspid regurgitation   Moderate mitral regurgitation   Mild pulmonic regurgitation         CC: Edema    HPI:  65 year old man with a history of chronic AF, Watchman device (implanted 4/2019), HTN, CVAs, alcoholism, tobacco abuse, COPD, HFpEF, GERD, cirrhosis, left BKA following traumatic injury, hospitalization in April 2020 for acute on chronic hypercapnic & hypoxemic respiratory failure with hospitalization complicated by encephalopathy, ventilator-dependence, and MRSA bacteremia.  He presented to the ER with complaints of progressively worsening R leg edema and dyspnea x ~ 1 week; unable to identify any exacerbating or alleviating factors but thinks his dose of Lasix was too low; no associated fever, chills, cough, angina.  On presentation to the ED, found to have RLE edema elevated BNP and CXR findings of pulmonary congestion suggestive of an Acute CHF exacerbation for which Cardiology team has been consulted.  He says that his edema has improved since admission.    PHMx:   Atrial Fibrillation  CHF last ECHO MYA 5/2020: EF 55-60%  Anemia  Alcohol Abuse/Withdrawl  GERD  COPD  Emphysema  Chronic LE pain  Cataracts B/L    Surgical History:   H/O tracheostomy now removed  Presence of Watchman left atrial appendage closure device   S/P BKA (below knee amputation) unilateral, left   S/P percutaneous endoscopic gastrostomy (PEG) tube placement now removed  Unilateral amputation of lower extremity below knee.     Social Hx:  Previous tobacco use, previous ETOH abuse, denies illicit drug use    FHx: Details unknown    REVIEW OF SYSTEMS:  CONSTITUTIONAL: No fevers or chills  EYES/ENT: No visual changes;  No vertigo or throat pain   NECK: No pain or stiffness  RESPIRATORY: +SOB on exertion  CARDIOVASCULAR: No chest pain or palpitations  GASTROINTESTINAL: denies abdominal pain  All other review of systems is negative unless indicated above    Vital Signs Last 24 Hrs  T(C): 36.5 (15 Oct 2020 08:19), Max: 37.2 (14 Oct 2020 18:05)  T(F): 97.7 (15 Oct 2020 08:19), Max: 98.9 (14 Oct 2020 18:05)  HR: 84 (15 Oct 2020 08:40) (82 - 108)  BP: 144/77 (15 Oct 2020 08:19) (120/66 - 144/77)  BP(mean): 84 (15 Oct 2020 00:03) (84 - 84)  RR: 18 (15 Oct 2020 08:19) (18 - 20)  SpO2: 91% (15 Oct 2020 08:19) (91% - 100%)    PHYSICAL EXAM:  Constitutional: NAD, awake and alert, well-developed  Respiratory: mild bibasilar crackles, nonlabored  HEENT: Normocephalic  Cardiovascular: S1 and S2, regular rate and rhythm, no Murmurs, gallops or rubs  Gastrointestinal: Bowel Sounds present, soft, nontender  Extremities: +1-2 edema of RLE, warm to touch, mild erythema, non tender, L BKA noted  Vascular: 2+ peripheral pulses  Neurological: A/O x 3, no focal deficits  Psych: Appropriate mood and affect    MEDICATIONS  (STANDING):  ALBUTerol    90 MICROgram(s) HFA Inhaler 2 Puff(s) Inhalation every 6 hours  aspirin  chewable 81 milliGRAM(s) Oral daily  budesonide 160 MICROgram(s)/formoterol 4.5 MICROgram(s) Inhaler 2 Puff(s) Inhalation two times a day  busPIRone 10 milliGRAM(s) Oral two times a day  clopidogrel Tablet 75 milliGRAM(s) Oral daily  diltiazem    milliGRAM(s) Oral daily  doxazosin 1 milliGRAM(s) Oral at bedtime  enoxaparin Injectable 40 milliGRAM(s) SubCutaneous daily  furosemide   Injectable 40 milliGRAM(s) IV Push daily  gabapentin 400 milliGRAM(s) Oral three times a day  influenza   Vaccine 0.5 milliLiter(s) IntraMuscular once  metoprolol succinate ER 25 milliGRAM(s) Oral daily  multivitamin 1 Tablet(s) Oral daily  pantoprazole    Tablet 40 milliGRAM(s) Oral before breakfast  QUEtiapine 50 milliGRAM(s) Oral at bedtime  senna 2 Tablet(s) Oral at bedtime  thiamine 100 milliGRAM(s) Oral daily  tiotropium 18 MICROgram(s) Capsule 1 Capsule(s) Inhalation daily    LABS:               11.0   6.46  )-----------( 142      ( 15 Oct 2020 06:16 )             35.1     141  |  106  |  14  ----------------------------<  102<H>  3.5   |  31  |  0.72    Ca    8.4<L>      15 Oct 2020 06:16  Mg     2.1     10-15    TPro  6.9  /  Alb  3.1<L>  /  TBili  0.7  /  DBili  x   /  AST  12<L>  /  ALT  16  /  AlkPhos  161<H>  10-14    CARDIAC MARKERS ( 14 Oct 2020 19:13 ) <0.015 ng/mL / x     / x     / x     / x      CARDIAC MARKERS ( 14 Oct 2020 00:35 ) <0.015 ng/mL / x     / x     / x     / x        MYA Complete w/Spect and Color (05.26.20 @ 13:17):   The left ventricle is normal in size, wall thickness,wall motion and contractility. No regional wall motion abnormalities. EF=55-60%.   No evidence of vegetation on cardiac valves or structures. No evidence of vegetation on left atrial appendage occlusion device.   Moderate tricuspid regurgitation   Moderate mitral regurgitation   Mild pulmonic regurgitation    12 Lead ECG (10.15.20 @ 07:07):  Atrial fibrillation, Rightward axis, Anteroseptal infarct (cited on or before 17-AUG-2017).  When compared with ECG of 18-APR-2020 19:44, Incomplete right bundle branch block is no longer Present

## 2020-10-15 NOTE — CONSULT NOTE ADULT - SUBJECTIVE AND OBJECTIVE BOX
HPI:    65 y.o.m with PMH of diastolic CHF (LVEF 55-60% 2020), a. fib s/p watchman device, obesity, COPD, HTN, neuropathy, s/p left BKA, h/o CVA, h/o trach s/p removal, h/o etoh abuse, h/o pneumothorax, h/o cardiac arrest admitted with complaint of RLE edema and pain. Pt with history of admission to  in April- for respiratory failure requiring intubation and trach placement and then was at [facility] until 20. Patient states over the last 24 hours he has noted to have increasing swelling of his RLE. He feels like it is burning and tender to touch. He denies any SOB. Does have a non productive cough from last few days now. No fevers. Denies any headaches, dizziness, CP, palpitations, abd pain, N/V/D. In the ED doppler negative for DVT. Noted with BNP 3997. CXR with vascular congestion. He was given lasix 40mg IV.  pat seen for pulmonary feeling better on diuretics.      PAST MEDICAL & SURGICAL HISTORY:  Chronic CHF    COPD without exacerbation    Atrial fibrillation    Presence of Watchman left atrial appendage closure device    Hypertension    GERD (gastroesophageal reflux disease)    Chronic obstructive pulmonary disease (COPD)    Anemia    Falls    Meningitis    Collapsed lung    Alcohol withdrawal    Emphysema of lung    Cirrhosis    CHF (congestive heart failure)    Poor historian    Alcohol abuse    Chronic atrial fibrillation    S/P percutaneous endoscopic gastrostomy (PEG) tube placement  now removed    H/O tracheostomy  now removed    Unilateral amputation of lower extremity below knee    Presence of Watchman left atrial appendage closure device    S/P BKA (below knee amputation) unilateral, left        Home Medications:  albuterol 90 mcg/inh inhalation aerosol: 2 puff(s) inhaled every 4 hours (15 Oct 2020 01:05)  aspirin 81 mg oral tablet, chewable: 1 tab(s) orally once a day (15 Oct 2020 01:05)  budesonide-formoterol 160 mcg-4.5 mcg/inh inhalation aerosol: 2 puff(s) inhaled 2 times a day (15 Oct 2020 01:05)  busPIRone 10 mg oral tablet: 1 tab(s) orally once a day (15 Oct 2020 01:05)  Cardizem  mg/24 hours oral capsule, extended release: 1 cap(s) orally once a day (15 Oct 2020 01:05)  clopidogrel 75 mg oral tablet: 1 tab(s) orally once a day (15 Oct 2020 01:05)  doxazosin 1 mg oral tablet: 1 tab(s) orally once a day (15 Oct 2020 01:)  furosemide 20 mg oral tablet: 1 tab(s) orally once a day (15 Oct 2020 01:)  gabapentin 400 mg oral capsule: 1 cap(s) orally 3 times a day (15 Oct 2020 01:)  Multiple Vitamins oral capsule: 1 cap(s) orally once a day (15 Oct 2020 01:05)  omeprazole 40 mg oral delayed release capsule: 1 cap(s) orally once a day (15 Oct 2020 01:05)  polyethylene glycol 3350 oral powder for reconstitution: 17 gram(s) orally once a day (15 Oct 2020 01:05)  QUEtiapine 50 mg oral tablet: 1 tab(s) orally once a day (15 Oct 2020 01:05)  senna oral tablet: 2 tab(s) orally once a day (at bedtime) (15 Oct 2020 01:05)  Spiriva 18 mcg inhalation capsule: 1 cap(s) inhaled once a day (15 Oct 2020 01:05)  thiamine 100 mg oral tablet: 1 tab(s) orally once a day (15 Oct 2020 01:)  Toprol-XL 25 mg oral tablet, extended release: 1 tab(s) orally once a day (15 Oct 2020 01:)      MEDICATIONS  (STANDING):  ALBUTerol    90 MICROgram(s) HFA Inhaler 2 Puff(s) Inhalation every 6 hours  aspirin  chewable 81 milliGRAM(s) Oral daily  budesonide 160 MICROgram(s)/formoterol 4.5 MICROgram(s) Inhaler 2 Puff(s) Inhalation two times a day  busPIRone 10 milliGRAM(s) Oral two times a day  clopidogrel Tablet 75 milliGRAM(s) Oral daily  diltiazem    milliGRAM(s) Oral daily  doxazosin 1 milliGRAM(s) Oral at bedtime  enoxaparin Injectable 40 milliGRAM(s) SubCutaneous daily  furosemide   Injectable 40 milliGRAM(s) IV Push daily  gabapentin 400 milliGRAM(s) Oral three times a day  influenza   Vaccine 0.5 milliLiter(s) IntraMuscular once  metoprolol succinate ER 25 milliGRAM(s) Oral daily  multivitamin 1 Tablet(s) Oral daily  pantoprazole    Tablet 40 milliGRAM(s) Oral before breakfast  QUEtiapine 50 milliGRAM(s) Oral at bedtime  senna 2 Tablet(s) Oral at bedtime  thiamine 100 milliGRAM(s) Oral daily  tiotropium 18 MICROgram(s) Capsule 1 Capsule(s) Inhalation daily    MEDICATIONS  (PRN):  polyethylene glycol 3350 17 Gram(s) Oral daily PRN Constipation      Allergies    Ceclor (Rash)  Duricef (Rash)    Intolerances        SOCIAL HISTORY: Denies tobacco, etoh abuse or illicit drug use    FAMILY HISTORY:  Family history unknown  Adopted        Vital Signs Last 24 Hrs  T(C): 36.5 (15 Oct 2020 08:19), Max: 37.2 (14 Oct 2020 18:05)  T(F): 97.7 (15 Oct 2020 08:19), Max: 98.9 (14 Oct 2020 18:05)  HR: 84 (15 Oct 2020 08:40) (82 - 108)  BP: 144/77 (15 Oct 2020 08:19) (120/66 - 144/77)  BP(mean): 84 (15 Oct 2020 00:03) (84 - 84)  RR: 18 (15 Oct 2020 08:19) (18 - 20)  SpO2: 91% (15 Oct 2020 08:19) (91% - 100%)        REVIEW OF SYSTEMS:    CONSTITUTIONAL:  As per HPI.  HEENT:  Eyes:  No diplopia or blurred vision. ENT:  No earache, sore throat or runny nose.  CARDIOVASCULAR:  No pressure, squeezing, tightness, heaviness or aching about the chest, neck, axilla or epigastrium.  RESPIRATORY:  No cough, +shortness of breath, PND or orthopnea.  GASTROINTESTINAL:  No nausea, vomiting or diarrhea.  GENITOURINARY:  No dysuria, frequency or urgency.  MUSCULOSKELETAL:  As per HPI.  SKIN:  No change in skin, hair or nails.  NEUROLOGIC:  No paresthesias, fasciculations, seizures or weakness.  PSYCHIATRIC:  No disorder of thought or mood.  ENDOCRINE:  No heat or cold intolerance, polyuria or polydipsia.  HEMATOLOGICAL:  No easy bruising or bleedings:  .     PHYSICAL EXAMINATION:    GENERAL APPEARANCE:  Pt. is not currently dyspneic, in no distress. Pt. is alert, oriented, and pleasant.  HEENT:  Pupils are normal and react normally. No icterus. Mucous membranes well colored.  NECK:  Supple. No lymphadenopathy. Jugular venous pressure not elevated. Carotids equal.   HEART:   The cardiac impulse has a normal quality. Regular. Normal S1 and S2. There are no murmurs, rubs or gallops noted  CHEST:  Chest crackles to auscultation. Normal respiratory effort.  ABDOMEN:  Soft and nontender.   EXTREMITIES:  There is no cyanosis, clubbing or edema.   SKIN:  No rash or significant lesions are noted.    LABS:                        11.0   6.46  )-----------( 142      ( 15 Oct 2020 06:16 )             35.1     10-15    141  |  106  |  14  ----------------------------<  102<H>  3.5   |  31  |  0.72    Ca    8.4<L>      15 Oct 2020 06:16  Mg     2.1     10-15    TPro  6.9  /  Alb  3.1<L>  /  TBili  0.7  /  DBili  x   /  AST  12<L>  /  ALT  16  /  AlkPhos  161<H>  1014    LIVER FUNCTIONS - ( 14 Oct 2020 19:13 )  Alb: 3.1 g/dL / Pro: 6.9 gm/dL / ALK PHOS: 161 U/L / ALT: 16 U/L / AST: 12 U/L / GGT: x             CARDIAC MARKERS ( 14 Oct 2020 19:13 )  <0.015 ng/mL / x     / x     / x     / x      CARDIAC MARKERS ( 14 Oct 2020 00:35 )  <0.015 ng/mL / x     / x     / x     / x          Urinalysis Basic - ( 14 Oct 2020 19:13 )    Color: Yellow / Appearance: Clear / S.005 / pH: x  Gluc: x / Ketone: Negative  / Bili: Negative / Urobili: Negative mg/dL   Blood: x / Protein: Negative mg/dL / Nitrite: Negative   Leuk Esterase: Negative / RBC: 11-25 /HPF / WBC Negative   Sq Epi: x / Non Sq Epi: Occasional / Bacteria: Negative      ABG - ( 15 Oct 2020 01:09 )  pH, Arterial: 7.41  pH, Blood: x     /  pCO2: 44    /  pO2: 65    / HCO3: 28    / Base Excess: 3.4   /  SaO2: 92        RADIOLOGY & ADDITIONAL STUDIES:     US Duplex Venous Lower Ext Complete, Bilateral (10.14.20 @ 19:47) >    IMPRESSION:  No evidence of deep venous thrombosis in either lower extremity.There is aLEFT below the knee amputation. There is a RIGHT popliteal cyst fossa cyst measuring 1.7 x 0.5 x 0.6 cm.      Xray Chest 1 View-PORTABLE IMMEDIATE (10.14.20 @ 19:13) >    FINDINGS:  There is cardiomegaly, vascular congestion and perihilar interstitial infiltrates without gross effusion. Constellation of findings suggestive of CHF.    Trachea midline. No hilar mass.  Visualized osseous structures are intact.        IMPRESSION:   Constellation of findings suggestive of CHF..

## 2020-10-15 NOTE — H&P ADULT - NEUROLOGICAL DETAILS
cranial nerves intact/responds to verbal commands/sensation intact/deep reflexes intact/normal strength/alert and oriented x 3/responds to pain

## 2020-10-15 NOTE — H&P ADULT - ASSESSMENT
64 yo male with PMH of diastolic CHF (LVEF 55-60% 5/2020), a. fib s/p watchman device, obesity, COPD, HTN, neuropathy, s/p left BKA, h/o CVA, h/o trach s/p removal, h/o etoh abuse, h/o pneumothorax, h/o cardiac arrest presents with:    #acute diastolic CHF exacerbation  - place in observation in med-surg  - diuresis with IV lasix 40mg daily  - MYA from 5/2020 with LVEF 55-60%, moderate MR and TR  - continue metoprolol  - strict I/Os, daily weights  - cardio consult  - PBNP 3995  - CXR with vascular congestion  - doppler negative for DVT  - COVID negative     #Chronic a. fib s/p watchmans device  - continue asa and plavix  - continue cardizem and toprol    #COPD - stable  - continue symbicort, spiriva, albuterol    #HTN   - continue cardizem and toprol    #DVT prophylaxis  - lovenox    please verify med-rec with daughter in AM    IMPROVE VTE Individual Risk Assessment    RISK                                                                Points    [  ] Previous VTE                                                  3    [  ] Thrombophilia                                               2    [  ] Lower limb paralysis                                      2        (unable to hold up >15 seconds)      [  ] Current Cancer                                              2         (within 6 months)    [  ] Immobilization > 24 hrs                                1    [  ] ICU/CCU stay > 24 hours                              1    [x  ] Age > 60                                                      1    IMPROVE VTE Score 1________    IMPROVE Score 0-1: Low Risk, No VTE prophylaxis required for most patients, encourage ambulation.   IMPROVE Score 2-3: At risk, pharmacologic VTE prophylaxis is indicated for most patients (in the absence of a contraindication)  IMPROVE Score > or = 4: High Risk, pharmacologic VTE prophylaxis is indicated for most patients (in the absence of a contraindication)

## 2020-10-15 NOTE — H&P ADULT - ATTENDING COMMENTS
64 yo Male with PMH of diastolic CHF (LVEF 55-60% 5/2020), a. fib s/p watchman device, obesity, COPD, HTN, neuropathy, s/p left BKA, h/o CVA, h/o trach s/p removal, h/o etoh abuse, h/o pneumothorax, h/o cardiac arrest presents to ED with complaint of RLE edema and pain. Pt with history of admission to  in April-June for respiratory failure requiring intubation and trach placement and then was at [facility] until 9/20/20. Patient states over the last 24 hours he has noted to have increasing swelling of his RLE. States feels like it is burning and tender to touch. He denies any SOB. Does have a non productive cough which he states he has had for a few days now. No fevers. Denies any headaches, dizziness, CP, palpitations, abd pain, N/V/D.   ROS: as above.  On exam: as above.  A/P:  #acute diastolic CHF exacerbation  - place in observation in med-surg  - diuresis with IV lasix 40mg daily  - MYA from 5/2020 with LVEF 55-60%, moderate MR and TR  - continue metoprolol  - strict I/Os, daily weights  - cardio consult  - PBNP 3997  - CXR with vascular congestion  - doppler negative for DVT  - COVID negative     #Chronic a. fib s/p watchmans device  - continue asa and plavix  - continue cardizem and toprol    #COPD - stable  - continue symbicort, spiriva, albuterol    #HTN   - continue cardizem and toprol    #DVT prophylaxis  - lovenox    please verify med-rec with daughter in AM    Patient is seen and examined at the bedside. Patient presented with the complain of leg edema and shortness of breath. Started on IV lasix daily. follow clinically. follow daily weight.

## 2020-10-16 LAB
ANION GAP SERPL CALC-SCNC: 6 MMOL/L — SIGNIFICANT CHANGE UP (ref 5–17)
BASOPHILS # BLD AUTO: 0.02 K/UL — SIGNIFICANT CHANGE UP (ref 0–0.2)
BASOPHILS NFR BLD AUTO: 0.3 % — SIGNIFICANT CHANGE UP (ref 0–2)
BUN SERPL-MCNC: 17 MG/DL — SIGNIFICANT CHANGE UP (ref 7–23)
CALCIUM SERPL-MCNC: 8.5 MG/DL — SIGNIFICANT CHANGE UP (ref 8.5–10.1)
CHLORIDE SERPL-SCNC: 106 MMOL/L — SIGNIFICANT CHANGE UP (ref 96–108)
CO2 SERPL-SCNC: 28 MMOL/L — SIGNIFICANT CHANGE UP (ref 22–31)
CREAT SERPL-MCNC: 0.6 MG/DL — SIGNIFICANT CHANGE UP (ref 0.5–1.3)
EOSINOPHIL # BLD AUTO: 0.13 K/UL — SIGNIFICANT CHANGE UP (ref 0–0.5)
EOSINOPHIL NFR BLD AUTO: 2.1 % — SIGNIFICANT CHANGE UP (ref 0–6)
GLUCOSE SERPL-MCNC: 87 MG/DL — SIGNIFICANT CHANGE UP (ref 70–99)
HCT VFR BLD CALC: 32.6 % — LOW (ref 39–50)
HGB BLD-MCNC: 10.1 G/DL — LOW (ref 13–17)
IMM GRANULOCYTES NFR BLD AUTO: 0.3 % — SIGNIFICANT CHANGE UP (ref 0–1.5)
LYMPHOCYTES # BLD AUTO: 1.1 K/UL — SIGNIFICANT CHANGE UP (ref 1–3.3)
LYMPHOCYTES # BLD AUTO: 17.5 % — SIGNIFICANT CHANGE UP (ref 13–44)
MCHC RBC-ENTMCNC: 28.7 PG — SIGNIFICANT CHANGE UP (ref 27–34)
MCHC RBC-ENTMCNC: 31 GM/DL — LOW (ref 32–36)
MCV RBC AUTO: 92.6 FL — SIGNIFICANT CHANGE UP (ref 80–100)
MONOCYTES # BLD AUTO: 0.51 K/UL — SIGNIFICANT CHANGE UP (ref 0–0.9)
MONOCYTES NFR BLD AUTO: 8.1 % — SIGNIFICANT CHANGE UP (ref 2–14)
NEUTROPHILS # BLD AUTO: 4.52 K/UL — SIGNIFICANT CHANGE UP (ref 1.8–7.4)
NEUTROPHILS NFR BLD AUTO: 71.7 % — SIGNIFICANT CHANGE UP (ref 43–77)
PLATELET # BLD AUTO: 140 K/UL — LOW (ref 150–400)
POTASSIUM SERPL-MCNC: 3.7 MMOL/L — SIGNIFICANT CHANGE UP (ref 3.5–5.3)
POTASSIUM SERPL-SCNC: 3.7 MMOL/L — SIGNIFICANT CHANGE UP (ref 3.5–5.3)
RBC # BLD: 3.52 M/UL — LOW (ref 4.2–5.8)
RBC # FLD: 15.8 % — HIGH (ref 10.3–14.5)
SODIUM SERPL-SCNC: 140 MMOL/L — SIGNIFICANT CHANGE UP (ref 135–145)
WBC # BLD: 6.3 K/UL — SIGNIFICANT CHANGE UP (ref 3.8–10.5)
WBC # FLD AUTO: 6.3 K/UL — SIGNIFICANT CHANGE UP (ref 3.8–10.5)

## 2020-10-16 PROCEDURE — 93010 ELECTROCARDIOGRAM REPORT: CPT

## 2020-10-16 PROCEDURE — 99233 SBSQ HOSP IP/OBS HIGH 50: CPT

## 2020-10-16 PROCEDURE — 99232 SBSQ HOSP IP/OBS MODERATE 35: CPT

## 2020-10-16 RX ORDER — TRAMADOL HYDROCHLORIDE 50 MG/1
25 TABLET ORAL ONCE
Refills: 0 | Status: DISCONTINUED | OUTPATIENT
Start: 2020-10-16 | End: 2020-10-16

## 2020-10-16 RX ADMIN — TIOTROPIUM BROMIDE 1 CAPSULE(S): 18 CAPSULE ORAL; RESPIRATORY (INHALATION) at 09:08

## 2020-10-16 RX ADMIN — Medication 100 MILLIGRAM(S): at 09:47

## 2020-10-16 RX ADMIN — POLYETHYLENE GLYCOL 3350 17 GRAM(S): 17 POWDER, FOR SOLUTION ORAL at 09:48

## 2020-10-16 RX ADMIN — CLOPIDOGREL BISULFATE 75 MILLIGRAM(S): 75 TABLET, FILM COATED ORAL at 09:47

## 2020-10-16 RX ADMIN — ALBUTEROL 2 PUFF(S): 90 AEROSOL, METERED ORAL at 09:08

## 2020-10-16 RX ADMIN — GABAPENTIN 400 MILLIGRAM(S): 400 CAPSULE ORAL at 13:46

## 2020-10-16 RX ADMIN — ALBUTEROL 2 PUFF(S): 90 AEROSOL, METERED ORAL at 21:32

## 2020-10-16 RX ADMIN — Medication 10 MILLIGRAM(S): at 09:47

## 2020-10-16 RX ADMIN — TRAMADOL HYDROCHLORIDE 25 MILLIGRAM(S): 50 TABLET ORAL at 22:39

## 2020-10-16 RX ADMIN — SENNA PLUS 2 TABLET(S): 8.6 TABLET ORAL at 21:38

## 2020-10-16 RX ADMIN — Medication 81 MILLIGRAM(S): at 09:47

## 2020-10-16 RX ADMIN — Medication 120 MILLIGRAM(S): at 09:47

## 2020-10-16 RX ADMIN — Medication 25 MILLIGRAM(S): at 09:47

## 2020-10-16 RX ADMIN — BUDESONIDE AND FORMOTEROL FUMARATE DIHYDRATE 2 PUFF(S): 160; 4.5 AEROSOL RESPIRATORY (INHALATION) at 21:27

## 2020-10-16 RX ADMIN — ENOXAPARIN SODIUM 40 MILLIGRAM(S): 100 INJECTION SUBCUTANEOUS at 09:47

## 2020-10-16 RX ADMIN — TRAMADOL HYDROCHLORIDE 25 MILLIGRAM(S): 50 TABLET ORAL at 23:32

## 2020-10-16 RX ADMIN — QUETIAPINE FUMARATE 50 MILLIGRAM(S): 200 TABLET, FILM COATED ORAL at 21:39

## 2020-10-16 RX ADMIN — Medication 10 MILLIGRAM(S): at 21:39

## 2020-10-16 RX ADMIN — Medication 1 TABLET(S): at 09:47

## 2020-10-16 RX ADMIN — GABAPENTIN 400 MILLIGRAM(S): 400 CAPSULE ORAL at 21:38

## 2020-10-16 RX ADMIN — Medication 2 MILLIGRAM(S): at 21:39

## 2020-10-16 RX ADMIN — Medication 25 MILLIGRAM(S): at 21:38

## 2020-10-16 RX ADMIN — Medication 40 MILLIGRAM(S): at 09:47

## 2020-10-16 RX ADMIN — GABAPENTIN 400 MILLIGRAM(S): 400 CAPSULE ORAL at 06:26

## 2020-10-16 RX ADMIN — BUDESONIDE AND FORMOTEROL FUMARATE DIHYDRATE 2 PUFF(S): 160; 4.5 AEROSOL RESPIRATORY (INHALATION) at 09:04

## 2020-10-16 RX ADMIN — PANTOPRAZOLE SODIUM 40 MILLIGRAM(S): 20 TABLET, DELAYED RELEASE ORAL at 06:27

## 2020-10-16 RX ADMIN — ALBUTEROL 2 PUFF(S): 90 AEROSOL, METERED ORAL at 02:26

## 2020-10-16 NOTE — PROGRESS NOTE ADULT - PROBLEM SELECTOR PLAN 1
Acute/chronic diastolic HF -- mild exacerbation; diurese with IV Lasix while monitoring renal function; continue metoprolol  change to po from tomorrow

## 2020-10-16 NOTE — PROGRESS NOTE ADULT - SUBJECTIVE AND OBJECTIVE BOX
Subjective:    pat better, sitting in bed, no new complaint.    Home Medications:  acetaminophen 325 mg oral tablet: 2 tab(s) orally every 4 hours, As Needed for general discomfort (15 Oct 2020 15:56)  albuterol 2.5 mg/3 mL (0.083%) inhalation solution: 3 milliliter(s) inhaled every 4 hours, As Needed for wheezing (15 Oct 2020 15:56)  albuterol 90 mcg/inh inhalation aerosol: 2 puff(s) inhaled every 4 hours (15 Oct 2020 15:56)  aspirin 81 mg oral delayed release tablet: 1 tab(s) orally once a day (15 Oct 2020 15:56)  budesonide-formoterol 160 mcg-4.5 mcg/inh inhalation aerosol: 2 puff(s) inhaled 2 times a day (15 Oct 2020 15:56)  busPIRone 10 mg oral tablet: 1 tab(s) orally 2 times a day (15 Oct 2020 15:56)  clopidogrel 75 mg oral tablet: 1 tab(s) orally once a day (15 Oct 2020 15:56)  dilTIAZem 120 mg/24 hours oral capsule, extended release: 1 cap(s) orally once a day (15 Oct 2020 15:56)  doxazosin 2 mg oral tablet: 1 tab(s) orally once a day (at bedtime) (15 Oct 2020 15:56)  furosemide 20 mg oral tablet: 1 tab(s) orally once a day (15 Oct 2020 15:56)  gabapentin 400 mg oral capsule: 1 cap(s) orally 3 times a day (15 Oct 2020 15:56)  Metoprolol Tartrate 25 mg oral tablet: 1 tab(s) orally 2 times a day (15 Oct 2020 15:56)  omeprazole 40 mg oral delayed release capsule: 1 cap(s) orally once a day (15 Oct 2020 15:56)  polyethylene glycol 3350 oral powder for reconstitution: 17 gram(s) orally once a day (15 Oct 2020 15:56)  QUEtiapine 50 mg oral tablet: 1 tab(s) orally once a day (at bedtime) (15 Oct 2020 15:56)  senna oral tablet: 2 tab(s) orally once a day (at bedtime) (15 Oct 2020 15:56)  Spiriva 18 mcg inhalation capsule: 1 cap(s) inhaled once a day (15 Oct 2020 15:56)  thiamine 100 mg oral tablet: 1 tab(s) orally once a day (15 Oct 2020 15:56)    MEDICATIONS  (STANDING):  ALBUTerol    90 MICROgram(s) HFA Inhaler 2 Puff(s) Inhalation every 6 hours  aspirin  chewable 81 milliGRAM(s) Oral daily  budesonide 160 MICROgram(s)/formoterol 4.5 MICROgram(s) Inhaler 2 Puff(s) Inhalation two times a day  busPIRone 10 milliGRAM(s) Oral two times a day  clopidogrel Tablet 75 milliGRAM(s) Oral daily  diltiazem    milliGRAM(s) Oral daily  doxazosin 2 milliGRAM(s) Oral at bedtime  enoxaparin Injectable 40 milliGRAM(s) SubCutaneous daily  furosemide   Injectable 40 milliGRAM(s) IV Push daily  gabapentin 400 milliGRAM(s) Oral three times a day  influenza   Vaccine 0.5 milliLiter(s) IntraMuscular once  metoprolol tartrate 25 milliGRAM(s) Oral two times a day  multivitamin 1 Tablet(s) Oral daily  pantoprazole    Tablet 40 milliGRAM(s) Oral before breakfast  QUEtiapine 50 milliGRAM(s) Oral at bedtime  senna 2 Tablet(s) Oral at bedtime  thiamine 100 milliGRAM(s) Oral daily  tiotropium 18 MICROgram(s) Capsule 1 Capsule(s) Inhalation daily    MEDICATIONS  (PRN):  polyethylene glycol 3350 17 Gram(s) Oral daily PRN Constipation      Allergies    Ceclor (Rash)  Duricef (Rash)    Intolerances        Vital Signs Last 24 Hrs  T(C): 36.4 (16 Oct 2020 08:50), Max: 36.6 (15 Oct 2020 16:30)  T(F): 97.5 (16 Oct 2020 08:50), Max: 97.9 (15 Oct 2020 16:30)  HR: 88 (16 Oct 2020 09:00) (88 - 93)  BP: 111/65 (16 Oct 2020 08:50) (111/58 - 111/65)  BP(mean): --  RR: 18 (16 Oct 2020 08:50) (18 - 20)  SpO2: 95% (16 Oct 2020 08:50) (92% - 95%)      PHYSICAL EXAMINATION:    NECK:  Supple. No lymphadenopathy. Jugular venous pressure not elevated. Carotids equal.   HEART:   The cardiac impulse has a normal quality. Reg., Nl S1 and S2.  There are no murmurs, rubs or gallops noted  CHEST:  Chest crackles to auscultation. Normal respiratory effort.  ABDOMEN:  Soft and nontender.   EXTREMITIES:  There is no edema.       LABS:                        10.1   6.30  )-----------( 140      ( 16 Oct 2020 07:14 )             32.6     10-16    140  |  106  |  17  ----------------------------<  87  3.7   |  28  |  0.60    Ca    8.5      16 Oct 2020 07:14  Mg     2.1     10-15    TPro  6.9  /  Alb  3.1<L>  /  TBili  0.7  /  DBili  x   /  AST  12<L>  /  ALT  16  /  AlkPhos  161<H>  10-14      Urinalysis Basic - ( 14 Oct 2020 19:13 )    Color: Yellow / Appearance: Clear / S.005 / pH: x  Gluc: x / Ketone: Negative  / Bili: Negative / Urobili: Negative mg/dL   Blood: x / Protein: Negative mg/dL / Nitrite: Negative   Leuk Esterase: Negative / RBC: 11-25 /HPF / WBC Negative   Sq Epi: x / Non Sq Epi: Occasional / Bacteria: Negative

## 2020-10-16 NOTE — PROGRESS NOTE ADULT - ASSESSMENT
10/16 - RLE pain and edema with SOB /cough  secondary to/Acute on chronic diastolic heart failure   hx admission april 2020 s/p intubation and trach , was in rehab until 9/20/20, trach removal  hx afib/watchman device /HTN  COPD  CVA  Left BKA  - on IV lasix , monitor BMP  cardio and pulm consults   switch to po in am, Pt consult, check o2 on ambulatio       64 yo male with PMH of diastolic CHF (LVEF 55-60% 5/2020), a. fib s/p watchman device, obesity, COPD, HTN, neuropathy, s/p left BKA, h/o CVA, h/o trach s/p removal, h/o etoh abuse, h/o pneumothorax, h/o cardiac arrest presents with:        #acute diastolic CHF exacerbation  - place in observation in med-surg  - diuresis with IV lasix 40mg daily  - MYA from 5/2020 with LVEF 55-60%, moderate MR and TR  - continue metoprolol  - strict I/Os, daily weights  - cardio consult  - PBNP 3997  - CXR with vascular congestion  - doppler negative for DVT  - COVID negative     #Chronic a. fib s/p watchmans device  - continue asa and plavix continue cardizem and toprol    #COPD - stable  - continue symbicort, spiriva, albuterol    #HTN   - continue cardizem and toprol#DVT prophylaxis  - lovenox

## 2020-10-16 NOTE — PROGRESS NOTE ADULT - ASSESSMENT
65 y.o.m with PMH of diastolic CHF (LVEF 55-60% 5/2020), a. fib s/p watchman device, obesity, COPD, HTN, neuropathy, s/p left BKA, h/o CVA, h/o trach s/p removal, h/o etoh abuse, h/o pneumothorax, h/o cardiac arrest admitted with  RLE pain and edema with SOB /cough  secondary to/Acute on chronic diastolic heart failure, hx afib/watchman device /HTN    PROBLEMS;    Acute diastolic CHF exacerbation  Chronic a. fib s/p watchmans device  COPD  Mild interstitial lung disease-NSIP h/o smoking  Pulmonary hypertension  HTN   s/p staph bactermia/sputum pseudomonas/staph  Hypercapnic & hypoxamic respiratory failure-s/p trach & PEG  sputum-Few Pseudomonas aeruginosa (Carbapenem Resistant)/Moderate Methicillin resistant Staphylococcus aureus  [facility]  COVID negative  Pulmonary hypertension  h/o stone in the left ureterovesicular junction/stone in the lower pole right kidney.  h/o Subacute hemorrhage in the right rectus abdominis along the anterior sheath  Cirrhosis  Mild splenomegaly-portal venous hypertension.  BPH  GERD  ETOH abuse  seizures disorder  H/o HTN    PLAN;    pulmonary stable  keep neg balance monitor lytes  aerosols  Sleep study as outpat  need PFT as outpat with h/o trach  supportive care  dvt prophylasix

## 2020-10-16 NOTE — PROGRESS NOTE ADULT - SUBJECTIVE AND OBJECTIVE BOX
CC: Edema    HPI:  65 year old man with a history of chronic AF, Watchman device (implanted 2019), HTN, CVAs, alcoholism, tobacco abuse, COPD, HFpEF, GERD, cirrhosis, left BKA following traumatic injury, hospitalization in 2020 for acute on chronic hypercapnic & hypoxemic respiratory failure with hospitalization complicated by encephalopathy, ventilator-dependence, and MRSA bacteremia.  He presented to the ER with complaints of progressively worsening R leg edema and dyspnea x ~ 1 week; unable to identify any exacerbating or alleviating factors but thinks his dose of Lasix was too low; no associated fever, chills, cough, angina.  On presentation to the ED, found to have RLE edema elevated BNP and CXR findings of pulmonary congestion suggestive of an Acute CHF exacerbation for which Cardiology team has been consulted.  He says that his edema has improved since admission.  10/15/20 Patient is feeling better other than tired , did not sleep well last night  , breathing better ,  decreased leg swelling     PHMx:   Atrial Fibrillation  CHF last ECHO MYA 2020: EF 55-60%  Anemia  Alcohol Abuse/Withdrawl  GERD  COPD  Emphysema  Chronic LE pain  Cataracts B/L    Surgical History:   H/O tracheostomy now removed  Presence of Watchman left atrial appendage closure device   S/P BKA (below knee amputation) unilateral, left   S/P percutaneous endoscopic gastrostomy (PEG) tube placement now removed  Unilateral amputation of lower extremity below knee.     Social Hx:  Previous tobacco use, previous ETOH abuse, denies illicit drug use    FHx: Details unknown      MEDICATIONS  (STANDING):  ALBUTerol    90 MICROgram(s) HFA Inhaler 2 Puff(s) Inhalation every 6 hours  aspirin  chewable 81 milliGRAM(s) Oral daily  budesonide 160 MICROgram(s)/formoterol 4.5 MICROgram(s) Inhaler 2 Puff(s) Inhalation two times a day  busPIRone 10 milliGRAM(s) Oral two times a day  clopidogrel Tablet 75 milliGRAM(s) Oral daily  diltiazem    milliGRAM(s) Oral daily  doxazosin 2 milliGRAM(s) Oral at bedtime  enoxaparin Injectable 40 milliGRAM(s) SubCutaneous daily  furosemide   Injectable 40 milliGRAM(s) IV Push daily  gabapentin 400 milliGRAM(s) Oral three times a day  influenza   Vaccine 0.5 milliLiter(s) IntraMuscular once  metoprolol tartrate 25 milliGRAM(s) Oral two times a day  multivitamin 1 Tablet(s) Oral daily  pantoprazole    Tablet 40 milliGRAM(s) Oral before breakfast  QUEtiapine 50 milliGRAM(s) Oral at bedtime  senna 2 Tablet(s) Oral at bedtime  thiamine 100 milliGRAM(s) Oral daily  tiotropium 18 MICROgram(s) Capsule 1 Capsule(s) Inhalation daily    MEDICATIONS  (PRN):  polyethylene glycol 3350 17 Gram(s) Oral daily PRN Constipation    REVIEW OF SYSTEMS:   better breathing   All other review of systems is negative unless indicated above    Vital Signs Last 24 Hrs  T(C): 36.4 (16 Oct 2020 08:50), Max: 36.6 (15 Oct 2020 16:30)  T(F): 97.5 (16 Oct 2020 08:50), Max: 97.9 (15 Oct 2020 16:30)  HR: 88 (16 Oct 2020 09:00) (88 - 93)  BP: 111/65 (16 Oct 2020 08:50) (111/58 - 111/65)  BP(mean): --  RR: 18 (16 Oct 2020 08:50) (18 - 20)  SpO2: 95% (16 Oct 2020 08:50) (92% - 95%)    I&O's Summary    15 Oct 2020 07:01  -  16 Oct 2020 07:00  --------------------------------------------------------  IN: 360 mL / OUT: 2500 mL / NET: -2140 mL    16 Oct 2020 07:01  -  16 Oct 2020 12:16  --------------------------------------------------------  IN: 360 mL / OUT: 1200 mL / NET: -840 mL        PHYSICAL EXAM:  Constitutional: NAD, awake and alert, well-developed  Respiratory: mild bibasilar crackles, nonlabored  HEENT: Normocephalic  Cardiovascular: S1 and S2, regular rate and rhythm, no Murmurs, gallops or rubs  Gastrointestinal: Bowel Sounds present, soft, nontender  Extremities: +1-2 edema of RLE, warm to touch, mild erythema, non tender, L BKA noted  Vascular: 2+ peripheral pulses  Neurological: A/O x 3, no focal deficits  Psych: Appropriate mood and affect        LABS:                            10.1   6.30  )-----------( 140      ( 16 Oct 2020 07:14 )             32.6     10-16    140  |  106  |  17  ----------------------------<  87  3.7   |  28  |  0.60    Ca    8.5      16 Oct 2020 07:14  Mg     2.1     10-15    TPro  6.9  /  Alb  3.1<L>  /  TBili  0.7  /  DBili  x   /  AST  12<L>  /  ALT  16  /  AlkPhos  161<H>  10-14    CARDIAC MARKERS ( 14 Oct 2020 19:13 )  <0.015 ng/mL / x     / x     / x     / x          LIVER FUNCTIONS - ( 14 Oct 2020 19:13 )  Alb: 3.1 g/dL / Pro: 6.9 gm/dL / ALK PHOS: 161 U/L / ALT: 16 U/L / AST: 12 U/L / GGT: x             Urinalysis Basic - ( 14 Oct 2020 19:13 )    Color: Yellow / Appearance: Clear / S.005 / pH: x  Gluc: x / Ketone: Negative  / Bili: Negative / Urobili: Negative mg/dL   Blood: x / Protein: Negative mg/dL / Nitrite: Negative   Leuk Esterase: Negative / RBC: 11-25 /HPF / WBC Negative   Sq Epi: x / Non Sq Epi: Occasional / Bacteria: Negative        Culture Results:   No growth (10-14-20 @ 19:13)    MYA Complete w/Spect and Color (20 @ 13:17):   The left ventricle is normal in size, wall thickness,wall motion and contractility. No regional wall motion abnormalities. EF=55-60%.   No evidence of vegetation on cardiac valves or structures. No evidence of vegetation on left atrial appendage occlusion device.   Moderate tricuspid regurgitation   Moderate mitral regurgitation   Mild pulmonic regurgitation    12 Lead ECG (10.15.20 @ 07:07):  Atrial fibrillation, Rightward axis, Anteroseptal infarct (cited on or before 17-AUG-2017).  When compared with ECG of 2020 19:44, Incomplete right bundle branch block is no longer Present  e

## 2020-10-17 ENCOUNTER — TRANSCRIPTION ENCOUNTER (OUTPATIENT)
Age: 65
End: 2020-10-17

## 2020-10-17 VITALS
DIASTOLIC BLOOD PRESSURE: 59 MMHG | SYSTOLIC BLOOD PRESSURE: 122 MMHG | HEART RATE: 84 BPM | RESPIRATION RATE: 17 BRPM | TEMPERATURE: 98 F | OXYGEN SATURATION: 96 %

## 2020-10-17 LAB
ANION GAP SERPL CALC-SCNC: 2 MMOL/L — LOW (ref 5–17)
BUN SERPL-MCNC: 20 MG/DL — SIGNIFICANT CHANGE UP (ref 7–23)
CALCIUM SERPL-MCNC: 8.4 MG/DL — LOW (ref 8.5–10.1)
CHLORIDE SERPL-SCNC: 106 MMOL/L — SIGNIFICANT CHANGE UP (ref 96–108)
CO2 SERPL-SCNC: 29 MMOL/L — SIGNIFICANT CHANGE UP (ref 22–31)
CREAT SERPL-MCNC: 0.68 MG/DL — SIGNIFICANT CHANGE UP (ref 0.5–1.3)
GLUCOSE SERPL-MCNC: 86 MG/DL — SIGNIFICANT CHANGE UP (ref 70–99)
POTASSIUM SERPL-MCNC: 4.1 MMOL/L — SIGNIFICANT CHANGE UP (ref 3.5–5.3)
POTASSIUM SERPL-SCNC: 4.1 MMOL/L — SIGNIFICANT CHANGE UP (ref 3.5–5.3)
SODIUM SERPL-SCNC: 137 MMOL/L — SIGNIFICANT CHANGE UP (ref 135–145)

## 2020-10-17 PROCEDURE — 99233 SBSQ HOSP IP/OBS HIGH 50: CPT

## 2020-10-17 PROCEDURE — 99239 HOSP IP/OBS DSCHRG MGMT >30: CPT

## 2020-10-17 RX ORDER — FUROSEMIDE 40 MG
1 TABLET ORAL
Qty: 30 | Refills: 0
Start: 2020-10-17 | End: 2020-11-15

## 2020-10-17 RX ORDER — IBUPROFEN 200 MG
400 TABLET ORAL ONCE
Refills: 0 | Status: COMPLETED | OUTPATIENT
Start: 2020-10-17 | End: 2020-10-17

## 2020-10-17 RX ORDER — FUROSEMIDE 40 MG
20 TABLET ORAL DAILY
Refills: 0 | Status: DISCONTINUED | OUTPATIENT
Start: 2020-10-17 | End: 2020-10-17

## 2020-10-17 RX ORDER — FUROSEMIDE 40 MG
1 TABLET ORAL
Qty: 0 | Refills: 0 | DISCHARGE

## 2020-10-17 RX ADMIN — Medication 1 TABLET(S): at 09:27

## 2020-10-17 RX ADMIN — Medication 400 MILLIGRAM(S): at 01:47

## 2020-10-17 RX ADMIN — TIOTROPIUM BROMIDE 1 CAPSULE(S): 18 CAPSULE ORAL; RESPIRATORY (INHALATION) at 08:10

## 2020-10-17 RX ADMIN — ENOXAPARIN SODIUM 40 MILLIGRAM(S): 100 INJECTION SUBCUTANEOUS at 09:26

## 2020-10-17 RX ADMIN — Medication 81 MILLIGRAM(S): at 09:27

## 2020-10-17 RX ADMIN — POLYETHYLENE GLYCOL 3350 17 GRAM(S): 17 POWDER, FOR SOLUTION ORAL at 09:26

## 2020-10-17 RX ADMIN — Medication 400 MILLIGRAM(S): at 02:15

## 2020-10-17 RX ADMIN — Medication 40 MILLIGRAM(S): at 09:27

## 2020-10-17 RX ADMIN — Medication 120 MILLIGRAM(S): at 09:28

## 2020-10-17 RX ADMIN — INFLUENZA VIRUS VACCINE 0.5 MILLILITER(S): 15; 15; 15; 15 SUSPENSION INTRAMUSCULAR at 16:13

## 2020-10-17 RX ADMIN — CLOPIDOGREL BISULFATE 75 MILLIGRAM(S): 75 TABLET, FILM COATED ORAL at 09:27

## 2020-10-17 RX ADMIN — ALBUTEROL 2 PUFF(S): 90 AEROSOL, METERED ORAL at 08:15

## 2020-10-17 RX ADMIN — ALBUTEROL 2 PUFF(S): 90 AEROSOL, METERED ORAL at 13:21

## 2020-10-17 RX ADMIN — GABAPENTIN 400 MILLIGRAM(S): 400 CAPSULE ORAL at 06:02

## 2020-10-17 RX ADMIN — BUDESONIDE AND FORMOTEROL FUMARATE DIHYDRATE 2 PUFF(S): 160; 4.5 AEROSOL RESPIRATORY (INHALATION) at 08:11

## 2020-10-17 RX ADMIN — GABAPENTIN 400 MILLIGRAM(S): 400 CAPSULE ORAL at 15:39

## 2020-10-17 RX ADMIN — PANTOPRAZOLE SODIUM 40 MILLIGRAM(S): 20 TABLET, DELAYED RELEASE ORAL at 06:02

## 2020-10-17 RX ADMIN — Medication 10 MILLIGRAM(S): at 09:28

## 2020-10-17 RX ADMIN — Medication 100 MILLIGRAM(S): at 09:27

## 2020-10-17 RX ADMIN — Medication 25 MILLIGRAM(S): at 09:27

## 2020-10-17 NOTE — PROGRESS NOTE ADULT - PROBLEM SELECTOR PLAN 1
Acute/chronic diastolic HF --clinically appears improved ,  continue metoprolol  change to lasix to  po daily . elevate feet ,

## 2020-10-17 NOTE — PROGRESS NOTE ADULT - ASSESSMENT
65 y.o.m with PMH of diastolic CHF (LVEF 55-60% 5/2020), a. fib s/p watchman device, obesity, COPD, HTN, neuropathy, s/p left BKA, h/o CVA, h/o trach s/p removal, h/o etoh abuse, h/o pneumothorax, h/o cardiac arrest admitted with  RLE pain and edema with SOB /cough  secondary to/Acute on chronic diastolic heart failure, hx afib/watchman device /HTN    PROBLEMS;    Acute diastolic CHF exacerbation  Chronic a. fib s/p watchmans device  COPD  Mild interstitial lung disease-NSIP h/o smoking  Pulmonary hypertension  HTN   s/p staph bactermia/sputum pseudomonas/staph  Hypercapnic & hypoxamic respiratory failure-s/p trach & PEG  sputum-Few Pseudomonas aeruginosa (Carbapenem Resistant)/Moderate Methicillin resistant Staphylococcus aureus  [facility]  COVID negative  Pulmonary hypertension  h/o stone in the left ureterovesicular junction/stone in the lower pole right kidney.  h/o Subacute hemorrhage in the right rectus abdominis along the anterior sheath  Cirrhosis  Mild splenomegaly-portal venous hypertension.  BPH  GERD  ETOH abuse  seizures disorder  H/o HTN    PLAN;    pulmonary stable  symptomatic rx  keep neg balance monitor lytes  aerosols  Sleep study as outpat  need PFT as outpat with h/o trach  supportive care  dvt prophylasix

## 2020-10-17 NOTE — DISCHARGE NOTE NURSING/CASE MANAGEMENT/SOCIAL WORK - PATIENT PORTAL LINK FT
You can access the FollowMyHealth Patient Portal offered by [facility] by registering at the following website: [email or web address]. By joining [employer]’s FollowMyHealth portal, you will also be able to view your health information using other applications (apps) compatible with our system.

## 2020-10-17 NOTE — PROGRESS NOTE ADULT - SUBJECTIVE AND OBJECTIVE BOX
CC: Edema    HPI:  65 year old man with a history of chronic AF, Watchman device (implanted 4/2019), HTN, CVAs, alcoholism, tobacco abuse, COPD, HFpEF, GERD, cirrhosis, left BKA following traumatic injury, hospitalization in April 2020 for acute on chronic hypercapnic & hypoxemic respiratory failure with hospitalization complicated by encephalopathy, ventilator-dependence, and MRSA bacteremia.  He presented to the ER with complaints of progressively worsening R leg edema and dyspnea x ~ 1 week; unable to identify any exacerbating or alleviating factors but thinks his dose of Lasix was too low; no associated fever, chills, cough, angina.  On presentation to the ED, found to have RLE edema elevated BNP and CXR findings of pulmonary congestion suggestive of an Acute CHF exacerbation for which Cardiology team has been consulted.  He says that his edema has improved since admission.  10/16/20 Patient is feeling better other than tired , did not sleep well last night  , breathing better ,  decreased leg swelling   10/17/20  patient is feeling fine less swelling ,     PHMx:   Atrial Fibrillation  CHF last ECHO MYA 5/2020: EF 55-60%  Anemia  Alcohol Abuse/Withdrawl  GERD  COPD  Emphysema  Chronic LE pain  Cataracts B/L    MEDICATIONSHome Medications:  acetaminophen 325 mg oral tablet: 2 tab(s) orally every 4 hours, As Needed for general discomfort (15 Oct 2020 15:56)  albuterol 2.5 mg/3 mL (0.083%) inhalation solution: 3 milliliter(s) inhaled every 4 hours, As Needed for wheezing (15 Oct 2020 15:56)  albuterol 90 mcg/inh inhalation aerosol: 2 puff(s) inhaled every 4 hours (15 Oct 2020 15:56)  aspirin 81 mg oral delayed release tablet: 1 tab(s) orally once a day (15 Oct 2020 15:56)  budesonide-formoterol 160 mcg-4.5 mcg/inh inhalation aerosol: 2 puff(s) inhaled 2 times a day (15 Oct 2020 15:56)  busPIRone 10 mg oral tablet: 1 tab(s) orally 2 times a day (15 Oct 2020 15:56)  clopidogrel 75 mg oral tablet: 1 tab(s) orally once a day (15 Oct 2020 15:56)  dilTIAZem 120 mg/24 hours oral capsule, extended release: 1 cap(s) orally once a day (15 Oct 2020 15:56)  doxazosin 2 mg oral tablet: 1 tab(s) orally once a day (at bedtime) (15 Oct 2020 15:56)  furosemide 20 mg oral tablet: 1 tab(s) orally once a day (15 Oct 2020 15:56)  gabapentin 400 mg oral capsule: 1 cap(s) orally 3 times a day (15 Oct 2020 15:56)  Metoprolol Tartrate 25 mg oral tablet: 1 tab(s) orally 2 times a day (15 Oct 2020 15:56)  omeprazole 40 mg oral delayed release capsule: 1 cap(s) orally once a day (15 Oct 2020 15:56)  polyethylene glycol 3350 oral powder for reconstitution: 17 gram(s) orally once a day (15 Oct 2020 15:56)  QUEtiapine 50 mg oral tablet: 1 tab(s) orally once a day (at bedtime) (15 Oct 2020 15:56)  senna oral tablet: 2 tab(s) orally once a day (at bedtime) (15 Oct 2020 15:56)  Spiriva 18 mcg inhalation capsule: 1 cap(s) inhaled once a day (15 Oct 2020 15:56)  thiamine 100 mg oral tablet: 1 tab(s) orally once a day (15 Oct 2020 15:56)          (STANDING):  ALBUTerol    90 MICROgram(s) HFA Inhaler 2 Puff(s) Inhalation every 6 hours  aspirin  chewable 81 milliGRAM(s) Oral daily  budesonide 160 MICROgram(s)/formoterol 4.5 MICROgram(s) Inhaler 2 Puff(s) Inhalation two times a day  busPIRone 10 milliGRAM(s) Oral two times a day  clopidogrel Tablet 75 milliGRAM(s) Oral daily  diltiazem    milliGRAM(s) Oral daily  doxazosin 2 milliGRAM(s) Oral at bedtime  enoxaparin Injectable 40 milliGRAM(s) SubCutaneous daily  furosemide   Injectable 40 milliGRAM(s) IV Push daily  gabapentin 400 milliGRAM(s) Oral three times a day  influenza   Vaccine 0.5 milliLiter(s) IntraMuscular once  metoprolol tartrate 25 milliGRAM(s) Oral two times a day  multivitamin 1 Tablet(s) Oral daily  pantoprazole    Tablet 40 milliGRAM(s) Oral before breakfast  QUEtiapine 50 milliGRAM(s) Oral at bedtime  senna 2 Tablet(s) Oral at bedtime  thiamine 100 milliGRAM(s) Oral daily  tiotropium 18 MICROgram(s) Capsule 1 Capsule(s) Inhalation daily    MEDICATIONS  (PRN):  polyethylene glycol 3350 17 Gram(s) Oral daily PRN Constipation      Vital Signs Last 24 Hrs  T(C): 36.3 (17 Oct 2020 08:48), Max: 36.7 (16 Oct 2020 16:50)  T(F): 97.4 (17 Oct 2020 08:48), Max: 98 (16 Oct 2020 16:50)  HR: 79 (17 Oct 2020 08:48) (79 - 99)  BP: 118/58 (17 Oct 2020 08:48) (110/83 - 139/62)  BP(mean): --  RR: 18 (17 Oct 2020 08:48) (17 - 18)  SpO2: 94% (17 Oct 2020 08:48) (93% - 94%)    I&O's Summary    16 Oct 2020 07:01  -  17 Oct 2020 07:00  --------------------------------------------------------  IN: 720 mL / OUT: 3710 mL / NET: -2990 mL          PHYSICAL EXAM:  Constitutional: NAD, awake and alert, well-developed  Respiratory: mild bibasilar crackles, nonlabored  HEENT: Normocephalic  Cardiovascular: S1 and S2, regular rate and rhythm, no Murmurs, gallops or rubs  Gastrointestinal: Bowel Sounds present, soft, nontender  Extremities: +1-2 edema of RLE, decreased erythema, non tender, L BKA noted  Vascular: 2+ peripheral pulses  Neurological: A/O x 3, no focal deficits  Psych: Appropriate mood and affect        LABS:                              10.1   6.30  )-----------( 140      ( 16 Oct 2020 07:14 )             32.6     10-17    137  |  106  |  20  ----------------------------<  86  4.1   |  29  |  0.68    Ca    8.4<L>      17 Oct 2020 07:50                  Culture Results:   No growth (10-14-20 @ 19:13)      MYA Complete w/Spect and Color (05.26.20 @ 13:17):   The left ventricle is normal in size, wall thickness,wall motion and contractility. No regional wall motion abnormalities. EF=55-60%.   No evidence of vegetation on cardiac valves or structures. No evidence of vegetation on left atrial appendage occlusion device.   Moderate tricuspid regurgitation   Moderate mitral regurgitation   Mild pulmonic regurgitation    12 Lead ECG (10.15.20 @ 07:07):  Atrial fibrillation, Rightward axis, Anteroseptal infarct (cited on or before 17-AUG-2017).  When compared with ECG of 18-APR-2020 19:44, Incomplete right bundle branch block is no longer Present

## 2020-10-17 NOTE — DISCHARGE NOTE PROVIDER - CARE PROVIDERS DIRECT ADDRESSES
,DirectAddress_Unknown,[email or web address]

## 2020-10-17 NOTE — DISCHARGE NOTE PROVIDER - CARE PROVIDER_API CALL
Ezra Lane)  Critical Care Medicine; Internal Medicine; Pulmonary Disease; Sleep Medicine  [address] [place] [address]  Phone: [phone]  Fax: [phone]  Follow Up Time:     Palla, Venugopal R  CARDIOVASCULAR DISEASE  [address], [place] [id number]  Phone: [phone]  Fax: [phone]  Follow Up Time:

## 2020-10-17 NOTE — DISCHARGE NOTE PROVIDER - HOSPITAL COURSE
66 yo male with PMH of diastolic CHF (LVEF 55-60% 5/2020), a. fib s/p watchman device, obesity, COPD, HTN, neuropathy, s/p left BKA, h/o CVA, h/o trach s/p removal, h/o etoh abuse, h/o pneumothorax, h/o cardiac arrest presents with:        #acute  on chronic diastolic CHF exacerbation  clinically compensated   treated with IV lasix 40mg daily, now switched to lasix 40mg po daily ( increased from 20mg which is his home dose)  did not qulaify for home O2, pulse ox at rest 95% and on ambulation 92%, says its usual for hime to feel a little winded after ambulation and is eager to go home/assisted living   - MYA from 5/2020 with LVEF 55-60%, moderate MR and TR  - continue metoprolol  - cardio consult appreciated   - PBNP 3997  - CXR with vascular congestion  - doppler negative for DVT  - COVID negative   cleared by cardio and pulm for discharge  f/u cardio in 1 week and f/u pulm as outpatient for PFT    #Chronic a. fib s/p watchmans device  - continue asa and plavix continue cardizem and toprol    #COPD - stable  - continue symbicort, spiriva, albuterol    #HTN   - continue cardizem and toprol    discharge time 47mins   patient says he walks with his cane and was able to do so today without any issues, he does not want PT       66 yo Male with PMH of diastolic CHF (LVEF 55-60% 5/2020), a. fib s/p watchman device, obesity, COPD, HTN, neuropathy, s/p left BKA, h/o CVA, h/o trach s/p removal, h/o etoh abuse, h/o pneumothorax, h/o cardiac arrest presents to ED with complaint of RLE edema and pain. Pt with history of admission to  in April-June for respiratory failure requiring intubation and trach placement and then was at [facility] until 9/20/20. Patient states over the last 24 hours he has noted to have increasing swelling of his RLE. States feels like it is burning and tender to touch. He denies any SOB. Does have a non productive cough which he states he has had for a few days now. No fevers. Denies any headaches, dizziness, CP, palpitations, abd pain, N/V/D. In the ED doppler negative for DVT. Noted with BNP 3997. CXR with vascular congestion. He was given lasix 40mg IV.     10/15 afebrile, has some cough , no SOB at rest, no cp  10/16-still with some swelling to RLE ext but improved, no SOb at rest  10/17  patient says he walks with his cane and was able to do so today without any issues, he does not want PT , feels back to baseline , RLE pain resolved, swelling significantly improved , no SOB now, no events   Constitutional: Well appearing  HEENT: Atraumatic, JOE, Normal, No congestion  Respiratory:inspiratory  crackles B/l lung bases, improved air entry, no wheezing  Cardiovascular: N S1S2;   Gastrointestinal: Abdomen soft, non tender, Bowel Ssounds present  Extremities:RLE edema and tenderness to palpation, left BKA  Neurological: AAO x 3, no gross focal motor deficits      discharge time 47mins  cardio nd pulm consults appreciated   i discussed with casemanagement and SW             66 yo male with PMH of diastolic CHF (LVEF 55-60% 5/2020), a. fib s/p watchman device, obesity, COPD, HTN, neuropathy, s/p left BKA, h/o CVA, h/o trach s/p removal, h/o etoh abuse, h/o pneumothorax, h/o cardiac arrest presents with:        #acute  on chronic diastolic CHF exacerbation  clinically compensated   treated with IV lasix 40mg daily, now switched to lasix 40mg po daily ( increased from 20mg which is his home dose)  did not qulaify for home O2, pulse ox at rest 95% and on ambulation 92%, says its usual for hime to feel a little winded after ambulation and is eager to go home/assisted living   - MYA from 5/2020 with LVEF 55-60%, moderate MR and TR  - continue metoprolol  - cardio consult appreciated   - PBNP 3997  - CXR with vascular congestion  - doppler negative for DVT  - COVID negative   cleared by cardio and pulm for discharge  f/u cardio in 1 week and f/u pulm as outpatient for PFT    # chronic anemia stable, Hb 10, no gross evidence of GI bleed , denies melena or BRBPR  #chronic  mild thrombocytoepnia stable 140 PLT, ( in may 2020 PLT were as low as 145)  f/u with PMD     #Chronic a. fib s/p watchmans device  - continue asa and plavix continue cardizem and toprol    #COPD - stable  - continue symbicort, spiriva, albuterol    #HTN   - continue cardizem and toprol    discharge time 47mins   patient says he walks with his cane and was able to do so today without any issues, he does not want PT       66 yo Male with PMH of diastolic CHF (LVEF 55-60% 5/2020), a. fib s/p watchman device, obesity, COPD, HTN, neuropathy, s/p left BKA, h/o CVA, h/o trach s/p removal, h/o etoh abuse, h/o pneumothorax, h/o cardiac arrest presents to ED with complaint of RLE edema and pain. Pt with history of admission to  in April-June for respiratory failure requiring intubation and trach placement and then was at [facility] until 9/20/20. Patient states over the last 24 hours he has noted to have increasing swelling of his RLE. States feels like it is burning and tender to touch. He denies any SOB. Does have a non productive cough which he states he has had for a few days now. No fevers. Denies any headaches, dizziness, CP, palpitations, abd pain, N/V/D. In the ED doppler negative for DVT. Noted with BNP 3997. CXR with vascular congestion. He was given lasix 40mg IV.     10/15 afebrile, has some cough , no SOB at rest, no cp  10/16-still with some swelling to RLE ext but improved, no SOb at rest  10/17  patient says he walks with his cane and was able to do so today without any issues, he does not want PT , feels back to baseline , RLE pain resolved, swelling significantly improved , no SOB now, no events   Constitutional: Well appearing  HEENT: Atraumatic, JOE, Normal, No congestion  Respiratory:inspiratory  crackles B/l lung bases, improved air entry, no wheezing  Cardiovascular: N S1S2;   Gastrointestinal: Abdomen soft, non tender, Bowel Ssounds present  Extremities:RLE edema and tenderness to palpation, left BKA  Neurological: AAO x 3, no gross focal motor deficits      discharge time 47mins  cardio nd pulm consults appreciated   i discussed with joshua and MICHAEL

## 2020-10-17 NOTE — CHART NOTE - NSCHARTNOTEFT_GEN_A_CORE
HOME O2 EVALUATION    Pulse Ox Room Air Rest:95%    Pulse Ox Room Air Ambulatin%  pt appeared and states he was winded after ambulating.     Pulse Ox Post Ambulation:

## 2020-10-17 NOTE — DISCHARGE NOTE PROVIDER - NSDCCPCAREPLAN_GEN_ALL_CORE_FT
PRINCIPAL DISCHARGE DIAGNOSIS  Diagnosis: Leg swelling  Assessment and Plan of Treatment:       SECONDARY DISCHARGE DIAGNOSES  Diagnosis: Acute on chronic congestive heart failure, unspecified heart failure type  Assessment and Plan of Treatment:     Diagnosis: Shortness of breath  Assessment and Plan of Treatment:      PRINCIPAL DISCHARGE DIAGNOSIS  Diagnosis: Leg swelling  Assessment and Plan of Treatment: you were treated for acute on chronic CHf exacerbation- your lasix dose has been increased please follow up with cardiology in [address] as outpatient    and follow up with pulmonology Dr Lane in1 to 2 weeks for PFT  follow up with your primary doctor in 1 week      SECONDARY DISCHARGE DIAGNOSES  Diagnosis: Acute on chronic congestive heart failure, unspecified heart failure type  Assessment and Plan of Treatment:     Diagnosis: Shortness of breath  Assessment and Plan of Treatment:

## 2020-10-17 NOTE — DISCHARGE NOTE PROVIDER - NSDCFUSCHEDAPPT_GEN_ALL_CORE_FT
STONE DIAZ ; 10/22/2020 ; NPP [facility] [address]  STONE DIAZ ; 10/28/2020 ; NPP Cardio [address] STONE Roman ; 12/12/2020 ; NPP [facility] [address] [facility]  STONE DIAZ ; 12/15/2020 ; NPP [facility] [address]  STONE DIAZ ; 12/15/2020 ; NPP [facility] [address]

## 2020-10-17 NOTE — DISCHARGE NOTE PROVIDER - NSDCMRMEDTOKEN_GEN_ALL_CORE_FT
acetaminophen 325 mg oral tablet: 2 tab(s) orally every 4 hours, As Needed for general discomfort  albuterol 2.5 mg/3 mL (0.083%) inhalation solution: 3 milliliter(s) inhaled every 4 hours, As Needed for wheezing  albuterol 90 mcg/inh inhalation aerosol: 2 puff(s) inhaled every 4 hours  aspirin 81 mg oral delayed release tablet: 1 tab(s) orally once a day  budesonide-formoterol 160 mcg-4.5 mcg/inh inhalation aerosol: 2 puff(s) inhaled 2 times a day  busPIRone 10 mg oral tablet: 1 tab(s) orally 2 times a day  clopidogrel 75 mg oral tablet: 1 tab(s) orally once a day  dilTIAZem 120 mg/24 hours oral capsule, extended release: 1 cap(s) orally once a day  doxazosin 2 mg oral tablet: 1 tab(s) orally once a day (at bedtime)  gabapentin 400 mg oral capsule: 1 cap(s) orally 3 times a day  Lasix 40 mg oral tablet: 1 tab(s) orally once a day  Metoprolol Tartrate 25 mg oral tablet: 1 tab(s) orally 2 times a day  omeprazole 40 mg oral delayed release capsule: 1 cap(s) orally once a day  polyethylene glycol 3350 oral powder for reconstitution: 17 gram(s) orally once a day  QUEtiapine 50 mg oral tablet: 1 tab(s) orally once a day (at bedtime)  senna oral tablet: 2 tab(s) orally once a day (at bedtime)  Spiriva 18 mcg inhalation capsule: 1 cap(s) inhaled once a day  thiamine 100 mg oral tablet: 1 tab(s) orally once a day

## 2020-10-17 NOTE — PROGRESS NOTE ADULT - SUBJECTIVE AND OBJECTIVE BOX
Subjective:    pat better, cough with loose phlegm, no distress.    Home Medications:  acetaminophen 325 mg oral tablet: 2 tab(s) orally every 4 hours, As Needed for general discomfort (15 Oct 2020 15:56)  albuterol 2.5 mg/3 mL (0.083%) inhalation solution: 3 milliliter(s) inhaled every 4 hours, As Needed for wheezing (15 Oct 2020 15:56)  albuterol 90 mcg/inh inhalation aerosol: 2 puff(s) inhaled every 4 hours (15 Oct 2020 15:56)  aspirin 81 mg oral delayed release tablet: 1 tab(s) orally once a day (15 Oct 2020 15:56)  budesonide-formoterol 160 mcg-4.5 mcg/inh inhalation aerosol: 2 puff(s) inhaled 2 times a day (15 Oct 2020 15:56)  busPIRone 10 mg oral tablet: 1 tab(s) orally 2 times a day (15 Oct 2020 15:56)  clopidogrel 75 mg oral tablet: 1 tab(s) orally once a day (15 Oct 2020 15:56)  dilTIAZem 120 mg/24 hours oral capsule, extended release: 1 cap(s) orally once a day (15 Oct 2020 15:56)  doxazosin 2 mg oral tablet: 1 tab(s) orally once a day (at bedtime) (15 Oct 2020 15:56)  furosemide 20 mg oral tablet: 1 tab(s) orally once a day (15 Oct 2020 15:56)  gabapentin 400 mg oral capsule: 1 cap(s) orally 3 times a day (15 Oct 2020 15:56)  Metoprolol Tartrate 25 mg oral tablet: 1 tab(s) orally 2 times a day (15 Oct 2020 15:56)  omeprazole 40 mg oral delayed release capsule: 1 cap(s) orally once a day (15 Oct 2020 15:56)  polyethylene glycol 3350 oral powder for reconstitution: 17 gram(s) orally once a day (15 Oct 2020 15:56)  QUEtiapine 50 mg oral tablet: 1 tab(s) orally once a day (at bedtime) (15 Oct 2020 15:56)  senna oral tablet: 2 tab(s) orally once a day (at bedtime) (15 Oct 2020 15:56)  Spiriva 18 mcg inhalation capsule: 1 cap(s) inhaled once a day (15 Oct 2020 15:56)  thiamine 100 mg oral tablet: 1 tab(s) orally once a day (15 Oct 2020 15:56)    MEDICATIONS  (STANDING):  ALBUTerol    90 MICROgram(s) HFA Inhaler 2 Puff(s) Inhalation every 6 hours  aspirin  chewable 81 milliGRAM(s) Oral daily  budesonide 160 MICROgram(s)/formoterol 4.5 MICROgram(s) Inhaler 2 Puff(s) Inhalation two times a day  busPIRone 10 milliGRAM(s) Oral two times a day  clopidogrel Tablet 75 milliGRAM(s) Oral daily  diltiazem    milliGRAM(s) Oral daily  doxazosin 2 milliGRAM(s) Oral at bedtime  enoxaparin Injectable 40 milliGRAM(s) SubCutaneous daily  furosemide    Tablet 20 milliGRAM(s) Oral daily  gabapentin 400 milliGRAM(s) Oral three times a day  influenza   Vaccine 0.5 milliLiter(s) IntraMuscular once  metoprolol tartrate 25 milliGRAM(s) Oral two times a day  multivitamin 1 Tablet(s) Oral daily  pantoprazole    Tablet 40 milliGRAM(s) Oral before breakfast  QUEtiapine 50 milliGRAM(s) Oral at bedtime  senna 2 Tablet(s) Oral at bedtime  thiamine 100 milliGRAM(s) Oral daily  tiotropium 18 MICROgram(s) Capsule 1 Capsule(s) Inhalation daily    MEDICATIONS  (PRN):  polyethylene glycol 3350 17 Gram(s) Oral daily PRN Constipation      Allergies    Ceclor (Rash)  Duricef (Rash)    Intolerances        Vital Signs Last 24 Hrs  T(C): 36.3 (17 Oct 2020 08:48), Max: 36.7 (16 Oct 2020 16:50)  T(F): 97.4 (17 Oct 2020 08:48), Max: 98 (16 Oct 2020 16:50)  HR: 79 (17 Oct 2020 08:48) (79 - 99)  BP: 118/58 (17 Oct 2020 08:48) (110/83 - 139/62)  BP(mean): --  RR: 18 (17 Oct 2020 08:48) (17 - 18)  SpO2: 94% (17 Oct 2020 08:48) (93% - 94%)      PHYSICAL EXAMINATION:    NECK:  Supple. No lymphadenopathy. Jugular venous pressure not elevated. Carotids equal.   HEART:   The cardiac impulse has a normal quality. Reg., Nl S1 and S2.  There are no murmurs, rubs or gallops noted  CHEST:  Chest crackles to auscultation. Normal respiratory effort.  ABDOMEN:  Soft and nontender.   EXTREMITIES:  There is no edema.       LABS:                        10.1   6.30  )-----------( 140      ( 16 Oct 2020 07:14 )             32.6     10-17    137  |  106  |  20  ----------------------------<  86  4.1   |  29  |  0.68    Ca    8.4<L>      17 Oct 2020 07:50

## 2020-10-19 ENCOUNTER — NON-APPOINTMENT (OUTPATIENT)
Age: 65
End: 2020-10-19

## 2020-10-20 DIAGNOSIS — Z86.74 PERSONAL HISTORY OF SUDDEN CARDIAC ARREST: ICD-10-CM

## 2020-10-20 DIAGNOSIS — I48.20 CHRONIC ATRIAL FIBRILLATION, UNSPECIFIED: ICD-10-CM

## 2020-10-20 DIAGNOSIS — R16.1 SPLENOMEGALY, NOT ELSEWHERE CLASSIFIED: ICD-10-CM

## 2020-10-20 DIAGNOSIS — Z87.442 PERSONAL HISTORY OF URINARY CALCULI: ICD-10-CM

## 2020-10-20 DIAGNOSIS — Z95.818 PRESENCE OF OTHER CARDIAC IMPLANTS AND GRAFTS: ICD-10-CM

## 2020-10-20 DIAGNOSIS — F10.21 ALCOHOL DEPENDENCE, IN REMISSION: ICD-10-CM

## 2020-10-20 DIAGNOSIS — K21.9 GASTRO-ESOPHAGEAL REFLUX DISEASE WITHOUT ESOPHAGITIS: ICD-10-CM

## 2020-10-20 DIAGNOSIS — Z79.51 LONG TERM (CURRENT) USE OF INHALED STEROIDS: ICD-10-CM

## 2020-10-20 DIAGNOSIS — Z89.512 ACQUIRED ABSENCE OF LEFT LEG BELOW KNEE: ICD-10-CM

## 2020-10-20 DIAGNOSIS — G40.909 EPILEPSY, UNSPECIFIED, NOT INTRACTABLE, WITHOUT STATUS EPILEPTICUS: ICD-10-CM

## 2020-10-20 DIAGNOSIS — J84.9 INTERSTITIAL PULMONARY DISEASE, UNSPECIFIED: ICD-10-CM

## 2020-10-20 DIAGNOSIS — G62.9 POLYNEUROPATHY, UNSPECIFIED: ICD-10-CM

## 2020-10-20 DIAGNOSIS — K76.6 PORTAL HYPERTENSION: ICD-10-CM

## 2020-10-20 DIAGNOSIS — Z86.73 PERSONAL HISTORY OF TRANSIENT ISCHEMIC ATTACK (TIA), AND CEREBRAL INFARCTION WITHOUT RESIDUAL DEFICITS: ICD-10-CM

## 2020-10-20 DIAGNOSIS — J44.9 CHRONIC OBSTRUCTIVE PULMONARY DISEASE, UNSPECIFIED: ICD-10-CM

## 2020-10-20 DIAGNOSIS — K70.30 ALCOHOLIC CIRRHOSIS OF LIVER WITHOUT ASCITES: ICD-10-CM

## 2020-10-20 DIAGNOSIS — I11.0 HYPERTENSIVE HEART DISEASE WITH HEART FAILURE: ICD-10-CM

## 2020-10-20 DIAGNOSIS — I27.20 PULMONARY HYPERTENSION, UNSPECIFIED: ICD-10-CM

## 2020-10-20 DIAGNOSIS — N40.0 BENIGN PROSTATIC HYPERPLASIA WITHOUT LOWER URINARY TRACT SYMPTOMS: ICD-10-CM

## 2020-10-20 DIAGNOSIS — I50.33 ACUTE ON CHRONIC DIASTOLIC (CONGESTIVE) HEART FAILURE: ICD-10-CM

## 2020-10-20 DIAGNOSIS — Z88.8 ALLERGY STATUS TO OTHER DRUGS, MEDICAMENTS AND BIOLOGICAL SUBSTANCES: ICD-10-CM

## 2020-10-20 DIAGNOSIS — E66.9 OBESITY, UNSPECIFIED: ICD-10-CM

## 2020-10-20 DIAGNOSIS — I08.1 RHEUMATIC DISORDERS OF BOTH MITRAL AND TRICUSPID VALVES: ICD-10-CM

## 2020-10-20 DIAGNOSIS — E66.2 MORBID (SEVERE) OBESITY WITH ALVEOLAR HYPOVENTILATION: ICD-10-CM

## 2020-10-21 ENCOUNTER — RX RENEWAL (OUTPATIENT)
Age: 65
End: 2020-10-21

## 2020-10-28 ENCOUNTER — APPOINTMENT (OUTPATIENT)
Dept: CARDIOLOGY | Facility: CLINIC | Age: 65
End: 2020-10-28
Payer: MEDICARE

## 2020-10-28 ENCOUNTER — NON-APPOINTMENT (OUTPATIENT)
Age: 65
End: 2020-10-28

## 2020-10-28 VITALS
WEIGHT: 273 LBS | SYSTOLIC BLOOD PRESSURE: 130 MMHG | BODY MASS INDEX: 36.98 KG/M2 | OXYGEN SATURATION: 93 % | HEIGHT: 72 IN | DIASTOLIC BLOOD PRESSURE: 80 MMHG | HEART RATE: 93 BPM

## 2020-10-28 PROCEDURE — 93000 ELECTROCARDIOGRAM COMPLETE: CPT

## 2020-10-28 PROCEDURE — 99214 OFFICE O/P EST MOD 30 MIN: CPT | Mod: 25

## 2020-10-28 PROCEDURE — 99072 ADDL SUPL MATRL&STAF TM PHE: CPT

## 2020-10-28 RX ORDER — FUROSEMIDE 20 MG/1
20 TABLET ORAL DAILY
Qty: 90 | Refills: 1 | Status: DISCONTINUED | COMMUNITY
Start: 2020-09-30 | End: 2020-10-28

## 2020-10-28 NOTE — REASON FOR VISIT
[Follow-Up - Clinic] : a clinic follow-up of [Atrial Fibrillation] : atrial fibrillation [Hypertension] : hypertension [Other: _____] : [unfilled] [Heart Failure] : congestive heart failure

## 2020-10-28 NOTE — DISCUSSION/SUMMARY
[FreeTextEntry1] : 65 year old gentleman with history of persistent atrial fibrillation on rate control now s/p Watchman device, now off AC, HTN, alcoholism, recurrent falls s/p BKA, CHF with preserved LV function and pulmonary hypertension.  Recent hospitalization for HFpEF\par -He has prior stroke noted on MRI and high risk of AF related thromboembolism (CHADSVASc =4). Although AC would be ideal, he is a poor candidate given his alcohol abuse and multiple falls and is now s/p Watchman\par - c/w ASA and plavix\par -continue rate control for AF with diltiazem and metoprolol\par - c/w lasix 40mg qd for volume control - pt appears mildly volume overloaded\par - Advised low salt diet, daily weights\par -TTE 10/29/18 shows low-normal LV function, mild MS, mod-sev MR, sev LAE, sev TR, sev pHTN. \par - Nuclear stress test 12/2018 showed normal myocardial perfusion\par Pt will return in 2-4 weeks or sooner as needed but I encouraged communication via phone or portal if necessary. \par The described plan was discussed with the pt and daughter.  All questions and concerns were addressed to the best of my knowledge. \par

## 2020-10-28 NOTE — REVIEW OF SYSTEMS
[see HPI] : see HPI [Negative] : Heme/Lymph [Fever] : no fever [Recent Weight Gain (___ Lbs)] : no recent weight gain [Chills] : no chills [Feeling Fatigued] : not feeling fatigued [Shortness Of Breath] : no shortness of breath [Dyspnea on exertion] : dyspnea during exertion [Chest Pain] : no chest pain [Lower Ext Edema] : lower extremity edema [Palpitations] : no palpitations [Dizziness] : no dizziness [Convulsions] : no convulsions

## 2020-10-28 NOTE — PHYSICAL EXAM
[General Appearance - Well Developed] : well developed [Normal Appearance] : normal appearance [General Appearance - Well Nourished] : well nourished [General Appearance - In No Acute Distress] : no acute distress [Normal Conjunctiva] : the conjunctiva exhibited no abnormalities [Eyelids - No Xanthelasma] : the eyelids demonstrated no xanthelasmas [Normal Oral Mucosa] : normal oral mucosa [No Oral Pallor] : no oral pallor [No Oral Cyanosis] : no oral cyanosis [JVD Elevated _____cm] : JVD elevated [unfilled] ~U cm above clavicle [Normal Jugular Venous A Waves Present] : normal jugular venous A waves present [No Jugular Venous [place] A Waves] : no jugular venous [place] A waves [Respiration, Rhythm And Depth] : normal respiratory rhythm and effort [Exaggerated Use Of Accessory Muscles For Inspiration] : no accessory muscle use [Auscultation Breath Sounds / Voice Sounds] : lungs were clear to auscultation bilaterally [Heart Sounds] : normal S1 and S2 [Murmurs] : no murmurs present [Abdomen Soft] : soft [Abdomen Tenderness] : non-tender [Abdomen Mass (___ Cm)] : no abdominal mass palpated [Nail Clubbing] : no clubbing of the fingernails [Cyanosis, Localized] : no localized cyanosis [Petechial Hemorrhages (___cm)] : no petechial hemorrhages [] : no ischemic changes [Oriented To Time, Place, And Person] : oriented to person, place, and time [Impaired Insight] : insight and judgment were intact [FreeTextEntry1] : s/p left BKA with LE prosthesis, RLE with edema

## 2020-10-28 NOTE — HISTORY OF PRESENT ILLNESS
[FreeTextEntry1] : 64 y/o M former smoker who presents today after recent hospitalization for respiratory failure documented as COPD exacerbation in hospital notes but daughter states he had HF as well.  Resp failure requiring prolonged intubation with subsequent trach which is now decannulated.  He was admitted from 04/18/2020 to 07/08/2020.  He now lives at [place].  He is feeling better since discharge. He still drinks 1-2 times per week, states 1-2 drinks. He has quit smoking!\par He was again hospitalized for a few days at [facility] for CHF and discharged 10/18/2020.  His lasix was increased to 40mg.  His legs swelling has improved, he reports no blood clots were found.  He sleeps in a recliner for the past 3 years.  He still has ALANIZ but not as bad as before.  \par  \par He has PMH of persistent atrial fibrillation on rate control, HTN, alcoholism, recurrent falls s/p BKA, and CHF with preserved LV function.  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 He had Watchman device placed and was started on AC.  Shortly after he had GIB requiring several blood transfusions.  He had repeat MYA done which showed well seated Watchman device and no evidence thrombus - AC has since been stopped and he is now only on ASA.\par He follows with Dr Clark \par \par \par \par \par

## 2020-10-29 ENCOUNTER — APPOINTMENT (OUTPATIENT)
Dept: FAMILY MEDICINE | Facility: CLINIC | Age: 65
End: 2020-10-29
Payer: MEDICARE

## 2020-10-29 VITALS
OXYGEN SATURATION: 94 % | WEIGHT: 275 LBS | TEMPERATURE: 98.3 F | DIASTOLIC BLOOD PRESSURE: 78 MMHG | HEIGHT: 72 IN | SYSTOLIC BLOOD PRESSURE: 126 MMHG | BODY MASS INDEX: 37.25 KG/M2 | HEART RATE: 100 BPM

## 2020-10-29 PROCEDURE — 99072 ADDL SUPL MATRL&STAF TM PHE: CPT

## 2020-10-29 PROCEDURE — 99214 OFFICE O/P EST MOD 30 MIN: CPT

## 2020-10-30 NOTE — ADDENDUM
[FreeTextEntry1] : I, Jennie Cooper acting as a scribe for Dr. Augusta Vasquez on Oct 29, 2020  at 5:01 PM\par

## 2020-10-30 NOTE — PLAN
[FreeTextEntry1] : \par - Blood work script\par - Pulmonology referral provided\par - Advised hematology follow up to optimize his health given chronic anemia \par - Hematology referral provided \par - Discussed adding potassium supplementation pending blood work \par - Highly encouraged weight loss\par - Monitor weight \par - F/u 1 month

## 2020-10-30 NOTE — END OF VISIT
[FreeTextEntry3] : Medical record entries made by the scribe today today, were at my direction and personally dictated to them by me, Dr. Augusta Vasquez on Oct 29, 2020. I have reviewed the chart and agree that the record accurately reflects my personal performance of the history, physical exam, assessment, and plan.\par

## 2020-10-30 NOTE — HISTORY OF PRESENT ILLNESS
[Family Member] : family member [FreeTextEntry1] : F/u appt. for CHF/Edema/Anemia  [de-identified] : STONE is a 65 year male here for follow up. Pt was hospitalized for respiratory failure from 4/18/20-7/8/2020. \par Per Cardiology note: Resp failure requiring prolonged intubation with subsequent trach which is now decannulated.\par He was again hospitalized for a few days at [facility] for CHF and discharged 10/18/2020. His lasix was increased to 40mg.\par \par Recently had blood work done with cardiology Dr. Daley which showed anemia.   \par \par Daughter reports he still has ALANIZ. States she would like to get him a home oxygen system. States he quit smoking and drinking. Still has an occasional glass of wine. No complaints on Seroquel. Daughter states his weight has been stable since being discharged from hospital. \par \par

## 2020-11-19 ENCOUNTER — OUTPATIENT (OUTPATIENT)
Dept: OUTPATIENT SERVICES | Facility: HOSPITAL | Age: 65
LOS: 1 days | Discharge: HOME | End: 2020-11-19
Payer: MEDICARE

## 2020-11-19 ENCOUNTER — APPOINTMENT (OUTPATIENT)
Dept: CARDIOLOGY | Facility: CLINIC | Age: 65
End: 2020-11-19
Payer: MEDICARE

## 2020-11-19 ENCOUNTER — NON-APPOINTMENT (OUTPATIENT)
Age: 65
End: 2020-11-19

## 2020-11-19 VITALS
OXYGEN SATURATION: 93 % | BODY MASS INDEX: 37.11 KG/M2 | HEART RATE: 102 BPM | HEIGHT: 72 IN | DIASTOLIC BLOOD PRESSURE: 78 MMHG | WEIGHT: 274 LBS | SYSTOLIC BLOOD PRESSURE: 146 MMHG

## 2020-11-19 DIAGNOSIS — Z93.1 GASTROSTOMY STATUS: Chronic | ICD-10-CM

## 2020-11-19 DIAGNOSIS — Z95.818 PRESENCE OF OTHER CARDIAC IMPLANTS AND GRAFTS: Chronic | ICD-10-CM

## 2020-11-19 DIAGNOSIS — Z98.890 OTHER SPECIFIED POSTPROCEDURAL STATES: Chronic | ICD-10-CM

## 2020-11-19 DIAGNOSIS — D64.9 ANEMIA, UNSPECIFIED: ICD-10-CM

## 2020-11-19 DIAGNOSIS — S88.119A COMPLETE TRAUMATIC AMPUTATION AT LEVEL BETWEEN KNEE AND ANKLE, UNSPECIFIED LOWER LEG, INITIAL ENCOUNTER: Chronic | ICD-10-CM

## 2020-11-19 DIAGNOSIS — Z89.512 ACQUIRED ABSENCE OF LEFT LEG BELOW KNEE: Chronic | ICD-10-CM

## 2020-11-19 PROCEDURE — 99214 OFFICE O/P EST MOD 30 MIN: CPT | Mod: 25

## 2020-11-19 PROCEDURE — 93000 ELECTROCARDIOGRAM COMPLETE: CPT

## 2020-11-19 NOTE — REASON FOR VISIT
[Follow-Up - Clinic] : a clinic follow-up of [Atrial Fibrillation] : atrial fibrillation [Heart Failure] : congestive heart failure [Hypertension] : hypertension [Other: _____] : [unfilled]

## 2020-11-19 NOTE — REVIEW OF SYSTEMS
[see HPI] : see HPI [Dyspnea on exertion] : dyspnea during exertion [Lower Ext Edema] : lower extremity edema [Negative] : Heme/Lymph [Fever] : no fever [Recent Weight Gain (___ Lbs)] : no recent weight gain [Chills] : no chills [Feeling Fatigued] : not feeling fatigued [Shortness Of Breath] : no shortness of breath [Chest Pain] : no chest pain [Palpitations] : no palpitations [Dizziness] : no dizziness [Convulsions] : no convulsions

## 2020-11-19 NOTE — HISTORY OF PRESENT ILLNESS
[FreeTextEntry1] : 64 y/o M former smoker who presents today for f/u HFpEF.  He has PMH of persistent atrial fibrillation on rate control, HTN, alcoholism, recurrent falls s/p BKA, and HFpEF (sleeps in a recliner for years now).  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 He had Watchman device placed and was started on AC.  Shortly after he had GIB requiring several blood transfusions.  He had repeat MYA done which showed well seated Watchman device and no evidence thrombus - AC has since been stopped and he is now on ASA and plavix.\par He follows with Dr Clark \par He has a recent hospitalization for respiratory failure documented as COPD exacerbation in hospital notes but daughter states he had HF as well.  Resp failure requiring prolonged intubation with subsequent trach which is now decannulated.  He was admitted from 04/18/2020 to 07/08/2020.  He now lives at [place].  \par He was again hospitalized for a few days at [facility] for CHF and discharged 10/18/2020.  His lasix was increased to 40mg.  \par Today he tells me that he feels like he is holding onto fluid although his weight has been stable overall .  He notes that his diet is not great - for breakfast had sausage, severino, pancakes, eggs.  He denies CP, SOB, diaphoresis, palpitations, dizziness, PND\par  \par \par \par \par \par

## 2020-11-19 NOTE — DISCUSSION/SUMMARY
[FreeTextEntry1] : 65 year old gentleman with history of HFpEF, persistent atrial fibrillation on rate control now s/p Watchman device, now off AC, HTN, alcoholism, recurrent falls s/p BKA and pulmonary hypertension.  Recent hospitalization for HFpEF\par -He has prior stroke noted on MRI and high risk of AF related thromboembolism (CHADSVASc =4). Although AC would be ideal, he is a poor candidate given his alcohol abuse and multiple falls and is now s/p Watchman\par - c/w ASA and plavix\par -continue rate control for AF with diltiazem and metoprolol\par - he continues to be volume overloaded, will increase lasix to 40mg bid for volume control \par - Advised low salt diet, daily weights\par -TTE 10/29/18 shows low-normal LV function, mild MS, mod-sev MR, sev LAE, sev TR, sev pHTN. \par - Nuclear stress test 12/2018 showed normal myocardial perfusion\par Pt will return in 1-2 mnths or sooner as needed but I encouraged communication via phone or portal if necessary. \par The described plan was discussed with the pt and daughter.  All questions and concerns were addressed to the best of my knowledge. \par

## 2020-11-20 ENCOUNTER — RESULT REVIEW (OUTPATIENT)
Age: 65
End: 2020-11-20

## 2020-11-20 ENCOUNTER — APPOINTMENT (OUTPATIENT)
Dept: HEMATOLOGY ONCOLOGY | Facility: CLINIC | Age: 65
End: 2020-11-20
Payer: MEDICARE

## 2020-11-20 VITALS
BODY MASS INDEX: 39.14 KG/M2 | TEMPERATURE: 97.4 F | DIASTOLIC BLOOD PRESSURE: 72 MMHG | WEIGHT: 288.6 LBS | HEART RATE: 105 BPM | SYSTOLIC BLOOD PRESSURE: 129 MMHG

## 2020-11-20 LAB
BASOPHILS # BLD AUTO: 0.01 K/UL — SIGNIFICANT CHANGE UP (ref 0–0.2)
BASOPHILS NFR BLD AUTO: 0.2 % — SIGNIFICANT CHANGE UP (ref 0–2)
EOSINOPHIL # BLD AUTO: 0.13 K/UL — SIGNIFICANT CHANGE UP (ref 0–0.5)
EOSINOPHIL NFR BLD AUTO: 2 % — SIGNIFICANT CHANGE UP (ref 0–6)
HCT VFR BLD CALC: 34.9 % — LOW (ref 39–50)
HGB BLD-MCNC: 11.1 G/DL — LOW (ref 13–17)
IMM GRANULOCYTES NFR BLD AUTO: 0.6 % — SIGNIFICANT CHANGE UP (ref 0–1.5)
LYMPHOCYTES # BLD AUTO: 0.91 K/UL — LOW (ref 1–3.3)
LYMPHOCYTES # BLD AUTO: 13.8 % — SIGNIFICANT CHANGE UP (ref 13–44)
MCHC RBC-ENTMCNC: 29 PG — SIGNIFICANT CHANGE UP (ref 27–34)
MCHC RBC-ENTMCNC: 31.8 GM/DL — LOW (ref 32–36)
MCV RBC AUTO: 91.1 FL — SIGNIFICANT CHANGE UP (ref 80–100)
MONOCYTES # BLD AUTO: 0.53 K/UL — SIGNIFICANT CHANGE UP (ref 0–0.9)
MONOCYTES NFR BLD AUTO: 8 % — SIGNIFICANT CHANGE UP (ref 2–14)
NEUTROPHILS # BLD AUTO: 4.97 K/UL — SIGNIFICANT CHANGE UP (ref 1.8–7.4)
NEUTROPHILS NFR BLD AUTO: 75.4 % — SIGNIFICANT CHANGE UP (ref 43–77)
NRBC # BLD: 0 /100 WBCS — SIGNIFICANT CHANGE UP (ref 0–0)
PLATELET # BLD AUTO: 144 K/UL — LOW (ref 150–400)
RBC # BLD: 3.83 M/UL — LOW (ref 4.2–5.8)
RBC # FLD: 14.6 % — HIGH (ref 10.3–14.5)
WBC # BLD: 6.59 K/UL — SIGNIFICANT CHANGE UP (ref 3.8–10.5)
WBC # FLD AUTO: 6.59 K/UL — SIGNIFICANT CHANGE UP (ref 3.8–10.5)

## 2020-11-20 PROCEDURE — 86334 IMMUNOFIX E-PHORESIS SERUM: CPT | Mod: 26

## 2020-11-20 PROCEDURE — 99205 OFFICE O/P NEW HI 60 MIN: CPT

## 2020-11-20 PROCEDURE — 83020 HEMOGLOBIN ELECTROPHORESIS: CPT | Mod: 26

## 2020-11-20 RX ORDER — MULTIVITAMIN WITH FOLIC ACID 400 MCG
TABLET ORAL
Qty: 90 | Refills: 0 | Status: DISCONTINUED | COMMUNITY
Start: 2018-05-15 | End: 2020-11-20

## 2020-11-20 RX ORDER — POLYETHYLENE GLYCOL 3350 17 G/17G
17 POWDER, FOR SOLUTION ORAL
Refills: 0 | Status: DISCONTINUED | COMMUNITY
End: 2020-11-20

## 2020-11-20 RX ORDER — ELECTROLYTES/DEXTROSE
SOLUTION, ORAL ORAL
Qty: 90 | Refills: 0 | Status: DISCONTINUED | COMMUNITY
Start: 2018-02-05 | End: 2020-11-20

## 2020-11-20 RX ORDER — TIOTROPIUM BROMIDE 18 UG/1
18 CAPSULE ORAL; RESPIRATORY (INHALATION)
Qty: 30 | Refills: 5 | Status: DISCONTINUED | COMMUNITY
Start: 2018-04-20 | End: 2020-11-20

## 2020-11-20 RX ORDER — ALBUTEROL SULFATE 90 UG/1
108 (90 BASE) AEROSOL, METERED RESPIRATORY (INHALATION)
Qty: 18 | Refills: 0 | Status: DISCONTINUED | COMMUNITY
Start: 2018-07-23 | End: 2020-11-20

## 2020-11-21 LAB
ALBUMIN SERPL ELPH-MCNC: 3.9 G/DL — SIGNIFICANT CHANGE UP (ref 3.3–5)
ALP SERPL-CCNC: 132 U/L — HIGH (ref 40–120)
ALT FLD-CCNC: 8 U/L — LOW (ref 10–45)
ANION GAP SERPL CALC-SCNC: 12 MMOL/L — SIGNIFICANT CHANGE UP (ref 5–17)
AST SERPL-CCNC: 14 U/L — SIGNIFICANT CHANGE UP (ref 10–40)
B2 MICROGLOB SERPL-MCNC: 3.1 MG/L — HIGH (ref 0.8–2.2)
BILIRUB SERPL-MCNC: 0.6 MG/DL — SIGNIFICANT CHANGE UP (ref 0.2–1.2)
BUN SERPL-MCNC: 17 MG/DL — SIGNIFICANT CHANGE UP (ref 7–23)
CALCIUM SERPL-MCNC: 8.8 MG/DL — SIGNIFICANT CHANGE UP (ref 8.4–10.5)
CHLORIDE SERPL-SCNC: 101 MMOL/L — SIGNIFICANT CHANGE UP (ref 96–108)
CO2 SERPL-SCNC: 27 MMOL/L — SIGNIFICANT CHANGE UP (ref 22–31)
CREAT SERPL-MCNC: 0.86 MG/DL — SIGNIFICANT CHANGE UP (ref 0.5–1.3)
ERYTHROCYTE [SEDIMENTATION RATE] IN BLOOD: 63 MM/HR — HIGH (ref 0–20)
FERRITIN SERPL-MCNC: 107 NG/ML — SIGNIFICANT CHANGE UP (ref 30–400)
FOLATE SERPL-MCNC: 13.1 NG/ML — SIGNIFICANT CHANGE UP
GLUCOSE SERPL-MCNC: 89 MG/DL — SIGNIFICANT CHANGE UP (ref 70–99)
LDH SERPL L TO P-CCNC: 182 U/L — SIGNIFICANT CHANGE UP (ref 50–242)
POTASSIUM SERPL-MCNC: 3.9 MMOL/L — SIGNIFICANT CHANGE UP (ref 3.5–5.3)
POTASSIUM SERPL-SCNC: 3.9 MMOL/L — SIGNIFICANT CHANGE UP (ref 3.5–5.3)
PROT SERPL-MCNC: 6.5 G/DL — SIGNIFICANT CHANGE UP (ref 6–8.3)
SODIUM SERPL-SCNC: 140 MMOL/L — SIGNIFICANT CHANGE UP (ref 135–145)
TESTOST FREE+TOTAL PANEL SERPL-MCNC: 188 NG/DL — LOW (ref 193–740)
VIT B12 SERPL-MCNC: 232 PG/ML — SIGNIFICANT CHANGE UP (ref 232–1245)

## 2020-11-21 NOTE — RESULTS/DATA
[FreeTextEntry1] : I reviewed recent + today's blood work results with patient.\par \par 11/4/20:\par WBC 6.45 K, hemoglobin 10.4 g/ dL, hematocrit 31.8 %, platelet  95,000\par 70% neutrophils, 18 % lymphocytes, 8.1% monocytes.\par \par 10/7/20:\par WBC 5.52, hemoglobin 10.3 g/dL, hematocrit 30.9%, platelet 108,000\par

## 2020-11-21 NOTE — REVIEW OF SYSTEMS
[Patient Intake Form Reviewed] : Patient intake form was reviewed [Fever] : no fever [Chills] : no chills [Fatigue] : fatigue [Recent Change In Weight] : ~T recent weight change [Chest Pain] : no chest pain [Palpitations] : no palpitations [Wheezing] : no wheezing [Cough] : cough [SOB on Exertion] : shortness of breath during exertion [Negative] : Endocrine [FreeTextEntry2] : Gaining weight

## 2020-11-21 NOTE — PHYSICAL EXAM
[Normal] : affect appropriate [Capable of only limited self care, confined to bed or chair more than 50% of waking hours] : Status 3- Capable of only limited self care, confined to bed or chair more than 50% of waking hours [de-identified] : Status post left below-knee amputation (BKA)

## 2020-11-21 NOTE — HISTORY OF PRESENT ILLNESS
[de-identified] : 65 M, former heavy smoker (smoked for 30 years at least a pack a day, discontinue tobacco April 2020), resident of [facility] assisted living since September 2020, with PMHx significant for many cardiac and pulmonary comorbidities: Hypertension, CHF, COPD, atrial fibrillation, GERD, alcoholism, obesity, referred for hematologic consultation of chronic anemia and thrombocytopenia. Patient recent story of prolonged hospitalization, respiratory failure requiring intubation (4/18–7/8/2020), status post tracheostomy (now decannulated), status post recent discharge from [facility] 10/18/2020 where he was hospitalized with ALANIZ.  Patient is on oxygen, and, according to his daughter, he is abstaining from tobacco.  Review of blood work reports dating back to 2017 until present, show a fluctuant hemoglobin (between 9 and 14 g/dL) with normochromic normocytic RBC indices, as well as intermittent mild thrombocytopenia (between 95,000 and 142,000).  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  Presently patient is fatigued, presenting dyspnea on exertion, denies palpitations.  No pertinent family history as he is adopted.  Did not have a colonoscopy in more than 5 years.  Laboratory values today consistent with hemoglobin 11.1 g/dL, WBC 6.59 with ANC 4970, and platelet count 144,000. Has never had bone marrow studies or therapeutic phlebotomies; denies recent hospitalizations.  Accompanied by daughter Ashley. [FreeTextEntry1] : expectant surveillance\par \par

## 2020-11-21 NOTE — CONSULT LETTER
[Dear  ___] : Dear  [unfilled], [Consult Letter:] : I had the pleasure of evaluating your patient, [unfilled]. [Please see my note below.] : Please see my note below. [Consult Closing:] : Thank you very much for allowing me to participate in the care of this patient.  If you have any questions, please do not hesitate to contact me. [Sincerely,] : Sincerely, [FreeTextEntry2] : Augusta Fair M.D. [FreeTextEntry3] : RENETTA GARCIA M.D.\par Hematology/ Oncology\par [facility] [place]\par [facility]\par [address] [place] [address], [facility]\par [place] [address]\par Telephone: [phone]\par FAX: [phone]\par \par \par

## 2020-11-21 NOTE — ASSESSMENT
[FreeTextEntry1] : Mr. DIAZ 's questions were answered to his satisfaction. He  expressed his  understanding and willingness to comply with the above recommendations, and  will return to the office in 3 months.\par \par \par

## 2020-11-23 ENCOUNTER — APPOINTMENT (OUTPATIENT)
Dept: FAMILY MEDICINE | Facility: CLINIC | Age: 65
End: 2020-11-23
Payer: MEDICARE

## 2020-11-23 VITALS
BODY MASS INDEX: 37.65 KG/M2 | HEART RATE: 110 BPM | SYSTOLIC BLOOD PRESSURE: 132 MMHG | WEIGHT: 278 LBS | HEIGHT: 72 IN | OXYGEN SATURATION: 95 % | DIASTOLIC BLOOD PRESSURE: 86 MMHG | TEMPERATURE: 98.7 F

## 2020-11-23 LAB
% ALBUMIN: 51.9 % — SIGNIFICANT CHANGE UP
% ALPHA 1: 5.1 % — SIGNIFICANT CHANGE UP
% ALPHA 2: 11.6 % — SIGNIFICANT CHANGE UP
% BETA: 14.3 % — SIGNIFICANT CHANGE UP
% GAMMA: 17.1 % — SIGNIFICANT CHANGE UP
% M SPIKE: SIGNIFICANT CHANGE UP
ALBUMIN SERPL ELPH-MCNC: 3.4 G/DL — LOW (ref 3.6–5.5)
ALBUMIN/GLOB SERPL ELPH: 1.1 RATIO — SIGNIFICANT CHANGE UP
ALPHA1 GLOB SERPL ELPH-MCNC: 0.3 G/DL — SIGNIFICANT CHANGE UP (ref 0.1–0.4)
ALPHA2 GLOB SERPL ELPH-MCNC: 0.8 G/DL — SIGNIFICANT CHANGE UP (ref 0.5–1)
B-GLOBULIN SERPL ELPH-MCNC: 0.9 G/DL — SIGNIFICANT CHANGE UP (ref 0.5–1)
GAMMA GLOBULIN: 1.1 G/DL — SIGNIFICANT CHANGE UP (ref 0.6–1.6)
HEMOGLOBIN INTERPRETATION: SIGNIFICANT CHANGE UP
HGB A MFR BLD: 97.8 % — SIGNIFICANT CHANGE UP (ref 95.8–98)
HGB A2 MFR BLD: 2.2 % — SIGNIFICANT CHANGE UP (ref 2–3.2)
IGA FLD-MCNC: 249 MG/DL — SIGNIFICANT CHANGE UP (ref 84–499)
IGG FLD-MCNC: 1084 MG/DL — SIGNIFICANT CHANGE UP (ref 610–1660)
IGM SERPL-MCNC: 473 MG/DL — HIGH (ref 35–242)
INTERPRETATION SERPL IFE-IMP: SIGNIFICANT CHANGE UP
KAPPA LC SER QL IFE: 3.25 MG/DL — HIGH (ref 0.33–1.94)
KAPPA/LAMBDA FREE LIGHT CHAIN RATIO, SERUM: 1.14 RATIO — SIGNIFICANT CHANGE UP (ref 0.26–1.65)
LAMBDA LC SER QL IFE: 2.85 MG/DL — HIGH (ref 0.57–2.63)
M-SPIKE: SIGNIFICANT CHANGE UP (ref 0–0)
PROT PATTERN SERPL ELPH-IMP: SIGNIFICANT CHANGE UP

## 2020-11-23 PROCEDURE — 99214 OFFICE O/P EST MOD 30 MIN: CPT

## 2020-11-23 NOTE — ADDENDUM
[FreeTextEntry1] : I, Jennie Cooper acting as a scribe for Dr. Augusta Vasquez on Nov 23, 2020  at 11:38 AM\par

## 2020-11-23 NOTE — PLAN
[FreeTextEntry1] : \par - Continue to watch salt intake \par - Continue with meds and current regimen \par - Continue weight loss efforts \par - Will receive pneumonia vaccine at next visit \par - F/u 3 month

## 2020-11-23 NOTE — HISTORY OF PRESENT ILLNESS
[Family Member] : family member [FreeTextEntry1] : Pt. f/u with  CHF/Edema [de-identified] : STONE is a 65 year male here for his one month follow up. Pt was seen by [facility] for anemia/ thrombocytopenia evaluation. He does not plan on following up given his evaluation was normal. Pt was also seen by cardiology Dr. Daley who increased his Furosemide 40 mg BID. He has appt with pulmonology the first week of December. Still taking Seroquel which is working well for him. No acute complaints today. \par \par

## 2020-11-23 NOTE — END OF VISIT
[FreeTextEntry3] : Medical record entries made by the scribe today today, were at my direction and personally dictated to them by me, Dr. Augusta Vasquez on Nov 23, 2020. I have reviewed the chart and agree that the record accurately reflects my personal performance of the history, physical exam, assessment, and plan.\par

## 2020-11-24 LAB — EPO SERPL-MCNC: 72.2 MIU/ML — HIGH (ref 2.6–18.5)

## 2020-11-25 DIAGNOSIS — Z20.828 CONTACT WITH AND (SUSPECTED) EXPOSURE TO OTHER VIRAL COMMUNICABLE DISEASES: ICD-10-CM

## 2020-12-08 LAB — SARS-COV-2 N GENE NPH QL NAA+PROBE: NOT DETECTED

## 2020-12-09 ENCOUNTER — APPOINTMENT (OUTPATIENT)
Dept: INTERNAL MEDICINE | Facility: CLINIC | Age: 65
End: 2020-12-09
Payer: MEDICARE

## 2020-12-09 VITALS
HEIGHT: 72 IN | RESPIRATION RATE: 18 BRPM | BODY MASS INDEX: 37.11 KG/M2 | OXYGEN SATURATION: 98 % | HEART RATE: 90 BPM | WEIGHT: 274 LBS | SYSTOLIC BLOOD PRESSURE: 150 MMHG | DIASTOLIC BLOOD PRESSURE: 78 MMHG | TEMPERATURE: 97.7 F

## 2020-12-09 PROCEDURE — 99205 OFFICE O/P NEW HI 60 MIN: CPT | Mod: 25

## 2020-12-09 PROCEDURE — 94727 GAS DIL/WSHOT DETER LNG VOL: CPT

## 2020-12-09 PROCEDURE — 94729 DIFFUSING CAPACITY: CPT

## 2020-12-09 PROCEDURE — ZZZZZ: CPT

## 2020-12-09 PROCEDURE — 99072 ADDL SUPL MATRL&STAF TM PHE: CPT

## 2020-12-09 PROCEDURE — 99215 OFFICE O/P EST HI 40 MIN: CPT | Mod: 25

## 2020-12-09 PROCEDURE — 94010 BREATHING CAPACITY TEST: CPT

## 2020-12-09 NOTE — HISTORY OF PRESENT ILLNESS
[TextBox_4] : Mr. Boone is a 64-year-old male presents for initial pulmonary evaluation. As accompanied by his daughter Mr. Boone has a history of COPD and congestive heart failure. He's had multiple admissions to the hospital. Mr. Payne was admitted to [facility] on 4/19. He was intubated at within 24 hours and spent approximately 3 months in the hospital. He required tracheostomy. He was subsequently weaned from mechanical ventilation and tracheostomy was removed. He is now living in an assisted living facility. Mr. Boone is currently using Spiriva and Symbicort for treatment of his COPD. He has a 35-pack-year smoking history and stopped smoking within the past several months. He does get shortness of breath with minimal exertion. Patient also has a left below the knee and the patient's secondary to trauma. 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 Mr. Boone had a transthoracic and transesophageal echocardiogram while in [facility]. Left ventricular ejection fraction is normal between 55 and 60%. He does have an enlarged left atrium, however, there is no significant mitral regurgitation mitral stenosis. Patient also has mild pulmonary hypertension. Mr. Boone states that he does have a history of obstructive sleep apnea many years ago and had a CPAP mask, however, he stopped using the mask after he lost approximately 45 pounds. He does have some symptoms of daytime tiredness at present.

## 2020-12-09 NOTE — DISCUSSION/SUMMARY
[FreeTextEntry1] : Mr. Boone is a 64-year-old male presents for initial pulmonary evaluation. He is accompanied by his daughter. Patient significant shortness of breath with exertion. Complete pulmonary function tests show only mild obstructive lung disease. He does have a significant restrictive component which is most likely related to abdominal obesity. CT scan of the chest performed in April 2001 he does not show evidence of significant emphysema. Patient does have point hypertension with enlarged left atrium. Mr. Boone has a previous history of obstructive sleep apnea. Will continue on current; Spiriva. Patient will also be sent for home polysomnography examination. I have stressed the importance of weight loss and increased activity. Patient will followup in 2 months.

## 2020-12-09 NOTE — PROCEDURE
[FreeTextEntry1] : Complete pulmonary function tests show mixed restrictive/obstructive lung disease. FEV1 is 1.96 L which is 51% predicted. FEV1/FVC ratio 76%. Total lung capacity is 3.85 L which is 54% predicted. Diffusing capacity is decreased at 60%.

## 2020-12-12 ENCOUNTER — APPOINTMENT (OUTPATIENT)
Dept: DISASTER EMERGENCY | Facility: CLINIC | Age: 65
End: 2020-12-12

## 2020-12-15 ENCOUNTER — APPOINTMENT (OUTPATIENT)
Dept: PULMONOLOGY | Facility: CLINIC | Age: 65
End: 2020-12-15

## 2020-12-30 ENCOUNTER — NON-APPOINTMENT (OUTPATIENT)
Age: 65
End: 2020-12-30

## 2020-12-30 ENCOUNTER — APPOINTMENT (OUTPATIENT)
Dept: CARDIOLOGY | Facility: CLINIC | Age: 65
End: 2020-12-30
Payer: MEDICARE

## 2020-12-30 VITALS
BODY MASS INDEX: 40.63 KG/M2 | HEIGHT: 72 IN | DIASTOLIC BLOOD PRESSURE: 80 MMHG | HEART RATE: 101 BPM | WEIGHT: 300 LBS | SYSTOLIC BLOOD PRESSURE: 142 MMHG | OXYGEN SATURATION: 92 %

## 2020-12-30 DIAGNOSIS — R60.9 EDEMA, UNSPECIFIED: ICD-10-CM

## 2020-12-30 PROCEDURE — 93000 ELECTROCARDIOGRAM COMPLETE: CPT

## 2020-12-30 PROCEDURE — 99072 ADDL SUPL MATRL&STAF TM PHE: CPT

## 2020-12-30 PROCEDURE — 99214 OFFICE O/P EST MOD 30 MIN: CPT | Mod: 25

## 2020-12-30 NOTE — REVIEW OF SYSTEMS
[see HPI] : see HPI [Dyspnea on exertion] : dyspnea during exertion [Lower Ext Edema] : lower extremity edema [Negative] : Heme/Lymph [Recent Weight Gain (___ Lbs)] : recent [unfilled] ~Ulb weight gain [Fever] : no fever [Chills] : no chills [Feeling Fatigued] : not feeling fatigued [Recent Weight Loss (___ Lbs)] : no recent weight loss [Shortness Of Breath] : no shortness of breath [Chest Pain] : no chest pain [Palpitations] : no palpitations [Dizziness] : no dizziness [Convulsions] : no convulsions

## 2020-12-30 NOTE — DISCUSSION/SUMMARY
[FreeTextEntry1] : 65 year old gentleman with history of HFpEF, persistent atrial fibrillation on rate control now s/p Watchman device, now off AC, HTN, alcoholism, recurrent falls s/p BKA and pulmonary hypertension.  Recent hospitalization for HFpEF\par -He has prior stroke noted on MRI and high risk of AF related thromboembolism (CHADSVASc =4). Although AC would be ideal, he is a poor candidate given his alcohol abuse and multiple falls and is now s/p Watchman\par - c/w ASA and plavix\par -continue rate control for AF with diltiazem and metoprolol\par - he continues to be volume overloaded, c/w lasix 40mg bid and add spironolactone for volume control \par - check labs in 1 week\par - Advised low salt diet, daily weights\par - Strongly advised Etoh cessation!\par - Advised pt to go to the nearest ED if symptoms persist or worsen \par -TTE 10/29/18 shows low-normal LV function, mild MS, mod-sev MR, sev LAE, sev TR, sev pHTN. \par - Nuclear stress test 12/2018 showed normal myocardial perfusion\par Pt will return in 1 mnth or sooner as needed but I encouraged communication via phone or portal if necessary. \par The described plan was discussed with the pt and daughter.  All questions and concerns were addressed to the best of my knowledge. \par

## 2020-12-30 NOTE — HISTORY OF PRESENT ILLNESS
[FreeTextEntry1] : 66 y/o M former smoker who presents today for f/u HFpEF.  He has PMH of persistent atrial fibrillation on rate control, HTN, alcoholism, recurrent falls s/p BKA, and HFpEF (sleeps in a recliner for years now).  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 He had Watchman device placed and was started on AC.  Shortly after he had GIB requiring several blood transfusions.  He had repeat MYA done which showed well seated Watchman device and no evidence thrombus - AC has since been stopped and he is now on ASA and plavix.\par He follows with Dr Clark \par He has a recent hospitalization for respiratory failure documented as COPD exacerbation in hospital notes but daughter states he had HF as well.  Resp failure requiring prolonged intubation with subsequent trach which is now decannulated.  He was admitted from 04/18/2020 to 07/08/2020.  He now lives at [place].  \par He was again hospitalized for a few days at [facility] for CHF and discharged 10/18/2020.  His lasix was increased to 40mg.  \par Today he tells me that he has gained 20lbs since last OV and his breathing is worse.  He has also increased his Etoh intake again - drank a bottle of vodka over a few days last week.  Fell twice while intoxicated.  He is also not sure if his diet is truly low sodium and mentions he will have pretzels for snack.\par He is accompanied by his daughter\par  \par \par \par \par \par

## 2021-01-14 ENCOUNTER — NON-APPOINTMENT (OUTPATIENT)
Age: 66
End: 2021-01-14

## 2021-01-20 ENCOUNTER — APPOINTMENT (OUTPATIENT)
Dept: CARDIOLOGY | Facility: CLINIC | Age: 66
End: 2021-01-20
Payer: MEDICARE

## 2021-01-20 ENCOUNTER — RX RENEWAL (OUTPATIENT)
Age: 66
End: 2021-01-20

## 2021-01-20 VITALS
OXYGEN SATURATION: 96 % | HEART RATE: 70 BPM | DIASTOLIC BLOOD PRESSURE: 84 MMHG | WEIGHT: 286 LBS | BODY MASS INDEX: 38.74 KG/M2 | HEIGHT: 72 IN | SYSTOLIC BLOOD PRESSURE: 130 MMHG

## 2021-01-20 PROCEDURE — 99214 OFFICE O/P EST MOD 30 MIN: CPT

## 2021-01-20 PROCEDURE — 99072 ADDL SUPL MATRL&STAF TM PHE: CPT

## 2021-01-21 NOTE — HISTORY OF PRESENT ILLNESS
[FreeTextEntry1] : 64 y/o M former smoker who presents today for f/u HFpEF.  He has PMH of persistent atrial fibrillation on rate control, HTN, alcoholism, recurrent falls s/p BKA, and HFpEF (sleeps in a recliner for years now).  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 He had Watchman device placed and was started on AC.  Shortly after he had GIB requiring several blood transfusions.  He had repeat MYA done which showed well seated Watchman device and no evidence thrombus - AC has since been stopped and he is now on ASA and plavix.\par He follows with Dr Clark \par He has a recent hospitalization for respiratory failure documented as COPD exacerbation in hospital notes but daughter states he had HF as well.  Resp failure requiring prolonged intubation with subsequent trach which is now decannulated.  He was admitted from 04/18/2020 to 07/08/2020.  He now lives at [place].  \par 10/2020 He was again hospitalized for a few days at [facility] for CHF  \par He is accompanied by his daughter\par \par Last OV his heart failure symptoms had worsened and he was started on spironolactone in addition to his lasix. He states that he is feeling better and that his swelling has decreased.  His weight is down as well.\par Labs were done which showed thrombocytopenia at 66.  ASA and plavix were stopped.  He denies any bleeding issues.  He has f/u with his hematologist and I will repeat CBC\par \par \par  \par \par \par \par \par

## 2021-01-21 NOTE — DISCUSSION/SUMMARY
[FreeTextEntry1] : 65 year old gentleman with history of HFpEF, persistent atrial fibrillation on rate control now s/p Watchman device, now off AC, HTN, mod MR/TR, alcoholism, recurrent falls s/p BKA and pulmonary hypertension.\par -He has prior stroke noted on MRI and high risk of AF related thromboembolism (CHADSVASc =4). Although AC would be ideal, he is a poor candidate given his alcohol abuse and multiple falls and is now s/p Watchman\par - ASA and plavix have been stopped due to thrombocytopenia.  Will need to restart at least one agent.  I will repeat CBC.  He also has an appt with hem\par -continue rate control for AF with diltiazem and metoprolol\par - he appears more euvolemic today, c/w lasix 40mg bid and spironolactone \par - monitor renal function and electrolytes\par - Advised low salt diet, daily weights\par - Strongly advised Etoh cessation!\par -TTE 10/29/18 shows low-normal LV function, mild MS, mod-sev MR, sev LAE, sev TR, sev pHTN. \par - MYA 05/2019 EF 60-65%, mod MR/TR, mod-sev pHTN, stable Watchman device with trace jose-device leak\par - Nuclear stress test 12/2018 showed normal myocardial perfusion\par Pt will return in 3-4 mnths or sooner as needed but I encouraged communication via phone or portal if necessary. \par The described plan was discussed with the pt and daughter.  All questions and concerns were addressed to the best of my knowledge. \par

## 2021-01-21 NOTE — REVIEW OF SYSTEMS
[see HPI] : see HPI [Negative] : Heme/Lymph [Fever] : no fever [Recent Weight Gain (___ Lbs)] : no recent weight gain [Chills] : no chills [Feeling Fatigued] : not feeling fatigued [Recent Weight Loss (___ Lbs)] : no recent weight loss [Shortness Of Breath] : no shortness of breath [Dyspnea on exertion] : not dyspnea during exertion [Chest Pain] : no chest pain [Lower Ext Edema] : no extremity edema [Palpitations] : no palpitations [Dizziness] : no dizziness [Convulsions] : no convulsions

## 2021-01-21 NOTE — PHYSICAL EXAM
[General Appearance - Well Developed] : well developed [Normal Appearance] : normal appearance [General Appearance - Well Nourished] : well nourished [General Appearance - In No Acute Distress] : no acute distress [Normal Conjunctiva] : the conjunctiva exhibited no abnormalities [Eyelids - No Xanthelasma] : the eyelids demonstrated no xanthelasmas [Normal Oral Mucosa] : normal oral mucosa [No Oral Pallor] : no oral pallor [No Oral Cyanosis] : no oral cyanosis [JVD Elevated _____cm] : JVD elevated [unfilled] ~U cm above clavicle [Normal Jugular Venous A Waves Present] : normal jugular venous A waves present [No Jugular Venous [place] A Waves] : no jugular venous [place] A waves [Respiration, Rhythm And Depth] : normal respiratory rhythm and effort [Auscultation Breath Sounds / Voice Sounds] : lungs were clear to auscultation bilaterally [Exaggerated Use Of Accessory Muscles For Inspiration] : no accessory muscle use [Murmurs] : no murmurs present [Heart Sounds] : normal S1 and S2 [Abdomen Soft] : soft [Abdomen Tenderness] : non-tender [Abdomen Mass (___ Cm)] : no abdominal mass palpated [Nail Clubbing] : no clubbing of the fingernails [Cyanosis, Localized] : no localized cyanosis [Petechial Hemorrhages (___cm)] : no petechial hemorrhages [] : no ischemic changes [Oriented To Time, Place, And Person] : oriented to person, place, and time [Impaired Insight] : insight and judgment were intact [FreeTextEntry1] : s/p left BKA with LE prosthesis, RLE with edema

## 2021-02-06 ENCOUNTER — RX RENEWAL (OUTPATIENT)
Age: 66
End: 2021-02-06

## 2021-02-06 ENCOUNTER — OUTPATIENT (OUTPATIENT)
Dept: OUTPATIENT SERVICES | Facility: HOSPITAL | Age: 66
LOS: 1 days | Discharge: HOME | End: 2021-02-06

## 2021-02-06 DIAGNOSIS — Z89.512 ACQUIRED ABSENCE OF LEFT LEG BELOW KNEE: Chronic | ICD-10-CM

## 2021-02-06 DIAGNOSIS — Z93.1 GASTROSTOMY STATUS: Chronic | ICD-10-CM

## 2021-02-06 DIAGNOSIS — S88.119A COMPLETE TRAUMATIC AMPUTATION AT LEVEL BETWEEN KNEE AND ANKLE, UNSPECIFIED LOWER LEG, INITIAL ENCOUNTER: Chronic | ICD-10-CM

## 2021-02-06 DIAGNOSIS — Z98.890 OTHER SPECIFIED POSTPROCEDURAL STATES: Chronic | ICD-10-CM

## 2021-02-06 DIAGNOSIS — Z86.2 PERSONAL HISTORY OF DISEASES OF THE BLOOD AND BLOOD-FORMING ORGANS AND CERTAIN DISORDERS INVOLVING THE IMMUNE MECHANISM: ICD-10-CM

## 2021-02-06 DIAGNOSIS — Z95.818 PRESENCE OF OTHER CARDIAC IMPLANTS AND GRAFTS: Chronic | ICD-10-CM

## 2021-02-06 DIAGNOSIS — D64.9 ANEMIA, UNSPECIFIED: ICD-10-CM

## 2021-02-09 ENCOUNTER — APPOINTMENT (OUTPATIENT)
Dept: FAMILY MEDICINE | Facility: CLINIC | Age: 66
End: 2021-02-09

## 2021-02-12 ENCOUNTER — APPOINTMENT (OUTPATIENT)
Age: 66
End: 2021-02-12

## 2021-02-13 ENCOUNTER — RX RENEWAL (OUTPATIENT)
Age: 66
End: 2021-02-13

## 2021-02-15 NOTE — ED PROVIDER NOTE - CONSTITUTIONAL [+], MLM
FEVER 65 year old male with history of anxiety symptoms presenting to ED with chest pain and multiple somatic complaints, vague in nature potentially related to anxiety but etiology pending cardiac work up

## 2021-02-16 NOTE — PROGRESS NOTE ADULT - NSHPATTENDINGPLANDISCUSS_GEN_ALL_CORE
Per nurse's notes: \"Patient co dizziness that has increased for the past 2 weeks. Patient states usually takes antivert with relief but not in the past 2 weeks patient also co left sided neck pain which is chronic \"    According to patient and spouse, the patient is scheduled to have an MRI of head and neck to further evaluate his chronic neck pain as well as his vertigo. Unfortunately is happening through the [place] system and the actual timing of the MRI is unclear. Patient did start a new medication for blood pressure a little over a week ago, amlodipine, and has noted increased lower extremity swelling as well as worsening of his vertigo. He denies any headache, paralysis or paresthesias. Patient describes intermittent visual blurring, has been seen by the [place] for it and is scheduled to get new glasses but has not gotten them yet. The history is provided by the patient and the spouse. Dizziness  This is a recurrent problem. The current episode started more than 1 week ago. The problem has been gradually worsening. There was no focality noted. Pertinent negatives include no focal weakness, no loss of sensation, no loss of balance, no slurred speech, no speech difficulty, no memory loss, no movement disorder, no agitation, no visual change, no auditory change, no mental status change, no unresponsiveness and no disorientation. There has been no fever. Associated symptoms include vomiting (Intermittent) and nausea. Pertinent negatives include no shortness of breath, no chest pain, no altered mental status, no confusion, no headaches, no choking, no bowel incontinence and no bladder incontinence. There were no medications administered prior to arrival. Associated medical issues do not include trauma, mood changes, bleeding disorder, seizures, CVA or clotting disorder.         Past Medical History:   Diagnosis Date    Hypercholesterolemia     Hypertension     Vertigo     Vertigo       Past Surgical History:   Procedure Laterality Date    HX APPENDECTOMY      HX GI  1964    Colon resection    NEUROLOGICAL PROCEDURE UNLISTED  1988    neck         Family History:   Problem Relation Age of Onset    Lupus Mother     Arthritis-osteo Mother     Hypertension Mother     Hypertension Father     Stroke Father        Social History     Socioeconomic History    Marital status:      Spouse name: Not on file    Number of children: Not on file    Years of education: Not on file    Highest education level: Not on file   Occupational History    Not on file   Social Needs    Financial resource strain: Not on file    Food insecurity     Worry: Not on file     Inability: Not on file    Transportation needs     Medical: Not on file     Non-medical: Not on file   Tobacco Use    Smoking status: Never Smoker    Smokeless tobacco: Never Used   Substance and Sexual Activity    Alcohol use: No    Drug use: No    Sexual activity: Not on file   Lifestyle    Physical activity     Days per week: Not on file     Minutes per session: Not on file    Stress: Not on file   Relationships    Social connections     Talks on phone: Not on file     Gets together: Not on file     Attends [employer] service: Not on file     Active member of club or organization: Not on file     Attends meetings of clubs or organizations: Not on file     Relationship status: Not on file    Intimate partner violence     Fear of current or ex partner: Not on file     Emotionally abused: Not on file     Physically abused: Not on file     Forced sexual activity: Not on file   Other Topics Concern    Not on file   Social History Narrative    Not on file         ALLERGIES: Bupropion, Clonidine, Doxazosin, Hydrochlorothiazide, Lipitor [atorvastatin], Lisinopril, Metoprolol, and Simvastatin    Review of Systems   Respiratory: Negative for choking and shortness of breath. Cardiovascular: Negative for chest pain.    Gastrointestinal: Positive for nausea and vomiting (Intermittent). Negative for abdominal pain and bowel incontinence. Genitourinary: Negative for bladder incontinence. Musculoskeletal: Positive for neck pain (Chronic) and neck stiffness (Chronic). Neurological: Positive for dizziness. Negative for tremors, focal weakness, facial asymmetry, speech difficulty, weakness, numbness, headaches and loss of balance. Psychiatric/Behavioral: Negative for agitation, confusion and memory loss. All other systems reviewed and are negative. Vitals:    02/16/21 1520 02/16/21 1523 02/16/21 1600 02/16/21 1611   BP: (!) 148/88 (!) 148/88 133/70    Pulse:  99 98 93   Resp:  16 20 20   Temp:  97.9 °F (36.6 °C)     SpO2:  98% 96% 96%   Weight:  86.2 kg (190 lb)              Physical Exam  Vitals signs and nursing note reviewed. Constitutional:       General: He is not in acute distress. Appearance: He is well-developed. HENT:      Head: Normocephalic and atraumatic. Right Ear: Tympanic membrane and external ear normal.      Left Ear: Tympanic membrane and external ear normal.      Ears:      Comments: Left TM is scarred from prior surgery     Nose: Nose normal.      Mouth/Throat:      Mouth: Mucous membranes are moist.   Eyes:      Extraocular Movements: Extraocular movements intact. Conjunctiva/sclera: Conjunctivae normal.      Pupils: Pupils are equal, round, and reactive to light. Neck:      Musculoskeletal: Normal range of motion and neck supple. Cardiovascular:      Rate and Rhythm: Normal rate and regular rhythm. Heart sounds: Normal heart sounds. Pulmonary:      Effort: Pulmonary effort is normal.      Breath sounds: Normal breath sounds. Abdominal:      General: Bowel sounds are normal.      Palpations: Abdomen is soft. Tenderness: There is no abdominal tenderness. Musculoskeletal: Normal range of motion. Skin:     General: Skin is warm and dry.       Capillary Refill: Capillary refill takes less than 2 seconds. Neurological:      Mental Status: He is alert and oriented to person, place, and time. Cranial Nerves: Cranial nerves are intact. Sensory: Sensation is intact. Motor: Motor function is intact. No tremor or pronator drift. Coordination: Coordination is intact. Psychiatric:         Mood and Affect: Mood and affect normal.         Speech: Speech normal.         Behavior: Behavior normal.          MDM  Number of Diagnoses or Management Options  Chronic neck pain: new and requires workup  Dehydration: new and requires workup  Dependent edema: new and requires workup  Vertigo: new and requires workup     Amount and/or Complexity of Data Reviewed  Clinical lab tests: ordered and reviewed  Tests in the radiology section of CPT®: ordered and reviewed  Obtain history from someone other than the patient: yes  Review and summarize past medical records: yes  Independent visualization of images, tracings, or specimens: yes    Risk of Complications, Morbidity, and/or Mortality  Presenting problems: moderate  Diagnostic procedures: moderate  Management options: moderate    Patient Progress  Patient progress: stable         Procedures    The patient was observed in the ED. Patient hydrated with NS, bp med will be switched to cardizem from norvas to minimize LE swelling and hopefully improve vertigo.     Results Reviewed:      Recent Results (from the past 24 hour(s))   CBC WITH AUTOMATED DIFF    Collection Time: 02/16/21  5:03 PM   Result Value Ref Range    WBC 9.9 4.3 - 11.1 K/uL    RBC 3.65 (L) 4.23 - 5.6 M/uL    HGB 12.2 (L) 13.6 - 17.2 g/dL    HCT 36.2 (L) 41.1 - 50.3 %    MCV 99.2 (H) 79.6 - 97.8 FL    MCH 33.4 (H) 26.1 - 32.9 PG    MCHC 33.7 31.4 - 35.0 g/dL    RDW 13.2 11.9 - 14.6 %    PLATELET 538 934 - 221 K/uL    MPV 9.4 9.4 - 12.3 FL    ABSOLUTE NRBC 0.00 0.0 - 0.2 K/uL    DF AUTOMATED      NEUTROPHILS 77 43 - 78 %    LYMPHOCYTES 14 13 - 44 %    MONOCYTES 7 4.0 - 12.0 %    EOSINOPHILS 2 0.5 - 7.8 %    BASOPHILS 1 0.0 - 2.0 %    IMMATURE GRANULOCYTES 0 0.0 - 5.0 %    ABS. NEUTROPHILS 7.5 1.7 - 8.2 K/UL    ABS. LYMPHOCYTES 1.4 0.5 - 4.6 K/UL    ABS. MONOCYTES 0.7 0.1 - 1.3 K/UL    ABS. EOSINOPHILS 0.2 0.0 - 0.8 K/UL    ABS. BASOPHILS 0.1 0.0 - 0.2 K/UL    ABS. IMM. GRANS. 0.0 0.0 - 0.5 K/UL   METABOLIC PANEL, COMPREHENSIVE    Collection Time: 02/16/21  5:03 PM   Result Value Ref Range    Sodium 138 136 - 145 mmol/L    Potassium 4.1 3.5 - 5.1 mmol/L    Chloride 105 98 - 107 mmol/L    CO2 26 21 - 32 mmol/L    Anion gap 7 7 - 16 mmol/L    Glucose 140 (H) 65 - 100 mg/dL    BUN 35 (H) 8 - 23 MG/DL    Creatinine 1.05 0.8 - 1.5 MG/DL    GFR est AA >60 >60 ml/min/1.73m2    GFR est non-AA >60 >60 ml/min/1.73m2    Calcium 8.9 8.3 - 10.4 MG/DL    Bilirubin, total 0.4 0.2 - 1.1 MG/DL    ALT (SGPT) 30 12 - 65 U/L    AST (SGOT) 24 15 - 37 U/L    Alk. phosphatase 65 50 - 136 U/L    Protein, total 7.1 6.3 - 8.2 g/dL    Albumin 3.7 3.2 - 4.6 g/dL    Globulin 3.4 2.3 - 3.5 g/dL    A-G Ratio 1.1 (L) 1.2 - 3.5     MAGNESIUM    Collection Time: 02/16/21  5:03 PM   Result Value Ref Range    Magnesium 2.2 1.8 - 2.4 mg/dL     CT HEAD WO CONT   Final Result      1. Findings most compatible with mild chronic small vessel ischemic changes. 2. Mild volume loss. I discussed the results of all labs, procedures, radiographs, and treatments with the patient and available family. Treatment plan is agreed upon and the patient is ready for discharge. All voiced understanding of the discharge plan and medication instructions or changes as appropriate. Questions about treatment in the ED were answered. All were encouraged to return should symptoms worsen or new problems develop. patient

## 2021-02-18 ENCOUNTER — APPOINTMENT (OUTPATIENT)
Dept: INTERNAL MEDICINE | Facility: CLINIC | Age: 66
End: 2021-02-18

## 2021-03-05 ENCOUNTER — RX RENEWAL (OUTPATIENT)
Age: 66
End: 2021-03-05

## 2021-04-09 ENCOUNTER — NON-APPOINTMENT (OUTPATIENT)
Age: 66
End: 2021-04-09

## 2021-04-12 ENCOUNTER — RX RENEWAL (OUTPATIENT)
Age: 66
End: 2021-04-12

## 2021-04-27 NOTE — AIRWAY PLACEMENT NOTE ADULT - POST AIRWAY PLACEMENT ASSESSMENT:
61 yo male BIBEMS for respiratory distress, recently COVID+, labs c/w HAGMA, hypoxic resp failure requiring intubation, concern for DKA, Found to have acute CVA w/ hemorraghic conversion. +infarcts L cerebellar, L posterior/frontal, b/l occipital/parietal, hemorrhagic transformation, s/p extubation on 4/13. Pt currently presents with an oropharyngeal dysphagia notable for impaired oral management with uncontrolled A-P spillover and oral residue, pharyngeal retention, and silent laryngeal penetration/aspiration of thin and thick purees and honey-thick liquids. Pt not a candidate for postural strategies due to inconsistent command following and baseline cognitive-linguistic deficits.    Disorders: reduced lingual strength/ROM/Rate of motion, reduced tongue to palate contact, reduced BOT to posterior pharyngeal wall contact, reduced hyo-laryngeal elevation/excursion, reduced laryngeal closure, mildly reduced pharyngeal contraction and stripping, reduced supraglottic sensation, reduced subglottic sensation.
breath sounds equal/chest excursion noted/breath sounds bilateral/positive end tidal CO2 noted/CXR pending
positive end tidal CO2 noted/breath sounds equal/breath sounds bilateral/CXR pending
breath sounds bilateral/positive end tidal CO2 noted/breath sounds equal/chest excursion noted
61 yo male BIBEMS for respiratory distress, recently COVID+, labs c/w HAGMA, hypoxic resp failure requiring intubation, concern for DKA, Found to have acute CVA w/ hemorraghic conversion. +infarcts L cerebellar, L posterior/frontal, b/l occipital/parietal, hemorrhagic transformation, s/p extubation on 4/13. Pt currently presents with an oropharyngeal dysphagia notable for impaired oral management with uncontrolled A-P spillover and oral residue, incomplete epiglottic retroflexion, pharyngeal retention, and silent laryngeal penetration/aspiration of thin and thick purees and honey-thick liquids. Pt not a candidate for postural strategies due to inconsistent command following and baseline cognitive-linguistic deficits.    Disorders: reduced lingual strength/ROM/Rate of motion, reduced tongue to palate contact, reduced BOT to posterior pharyngeal wall contact, reduced hyo-laryngeal elevation/excursion, reduced laryngeal closure, mildly reduced pharyngeal contraction and stripping, reduced supraglottic sensation, reduced subglottic sensation.

## 2021-04-28 ENCOUNTER — RX RENEWAL (OUTPATIENT)
Age: 66
End: 2021-04-28

## 2021-05-01 ENCOUNTER — RX RENEWAL (OUTPATIENT)
Age: 66
End: 2021-05-01

## 2021-05-14 ENCOUNTER — NON-APPOINTMENT (OUTPATIENT)
Age: 66
End: 2021-05-14

## 2021-05-14 NOTE — BRIEF OPERATIVE NOTE - NSICDXBRIEFPOSTOP_GEN_ALL_CORE_FT
Spoke to patient and he is aware that colonoscopy for 5/17 will be cancelled due to he needs to continue cardiac workup below he can do Colonoscopy. Patient verbalized understanding with no further questions.    Message routed to pre admit to cancel colonoscopy.    Message routed to Dr. Bay English as RUSLAN.         POST-OP DIAGNOSIS:  Cecal ulcer 29-May-2019 09:26:06  Bertha Yanez

## 2021-05-17 ENCOUNTER — RX RENEWAL (OUTPATIENT)
Age: 66
End: 2021-05-17

## 2021-06-02 ENCOUNTER — APPOINTMENT (OUTPATIENT)
Dept: CARDIOLOGY | Facility: CLINIC | Age: 66
End: 2021-06-02

## 2021-06-04 NOTE — ED ADULT NURSE NOTE - ISOLATION TYPE:
Chief complaint:   Chief Complaint   Patient presents with   • Office Visit   • New Patient   • Ankle     Right ankle.        Vitals:  Visit Vitals  /82 (BP Location: RUE - Right upper extremity, Patient Position: Sitting, Cuff Size: Large Adult)   Pulse 71   Resp 16   Ht 5' 10\" (1.778 m)   Wt 101 kg   SpO2 97%   BMI 31.95 kg/m²       HISTORY OF PRESENT ILLNESS     Patient states he has had right ankle pain for 2 months.  He denies any history of injury.  The pain is aching in nature.  The pain does not radiate.  It is moderately severe.  It is intermittent.  It is made worse by walking.      Other significant problems:  There are no problems to display for this patient.      PAST MEDICAL, FAMILY AND SOCIAL HISTORY     Medications:  Current Outpatient Medications   Medication   • azithromycin (ZITHROMAX) 500 MG tablet     No current facility-administered medications for this visit.       Allergies:  ALLERGIES:  No Known Allergies    Past Medical  History/Surgeries:  History reviewed. No pertinent past medical history.    History reviewed. No pertinent surgical history.    Family History:  Family History   Problem Relation Age of Onset   • High blood pressure Mother    • Diabetes Father    • High blood pressure Father        Social History:  Social History     Tobacco Use   • Smoking status: Never Smoker   • Smokeless tobacco: Never Used   Substance Use Topics   • Alcohol use: No       REVIEW OF SYSTEMS     Review of Systems   Musculoskeletal: Positive for arthralgias. Negative for gait problem and joint swelling.       PHYSICAL EXAM     Physical Exam  Constitutional:       Appearance: Normal appearance. He is normal weight.   HENT:      Head: Normocephalic and atraumatic.   Cardiovascular:      Rate and Rhythm: Normal rate and regular rhythm.      Heart sounds: Normal heart sounds.   Pulmonary:      Effort: Pulmonary effort is normal.      Breath sounds: Normal breath sounds.   Musculoskeletal:      Comments:  Right ankle:  There is no swelling or tenderness.  The range of motion is within normal limits.   Neurological:      Mental Status: He is alert.         ASSESSMENT/PLAN     Assessment & Plan:  Job was seen today for office visit, new patient and ankle.    Diagnoses and all orders for this visit:    Right ankle pain, unspecified chronicity  -     SERVICE TO PODIATRY       None

## 2021-06-10 ENCOUNTER — APPOINTMENT (OUTPATIENT)
Dept: CARDIOLOGY | Facility: CLINIC | Age: 66
End: 2021-06-10
Payer: MEDICARE

## 2021-06-10 ENCOUNTER — NON-APPOINTMENT (OUTPATIENT)
Age: 66
End: 2021-06-10

## 2021-06-10 VITALS
BODY MASS INDEX: 39.55 KG/M2 | WEIGHT: 292 LBS | DIASTOLIC BLOOD PRESSURE: 69 MMHG | HEIGHT: 72 IN | OXYGEN SATURATION: 93 % | SYSTOLIC BLOOD PRESSURE: 129 MMHG | HEART RATE: 87 BPM

## 2021-06-10 PROCEDURE — 93000 ELECTROCARDIOGRAM COMPLETE: CPT

## 2021-06-10 PROCEDURE — 99215 OFFICE O/P EST HI 40 MIN: CPT

## 2021-06-10 RX ORDER — FUROSEMIDE 40 MG/1
40 TABLET ORAL TWICE DAILY
Qty: 60 | Refills: 4 | Status: DISCONTINUED | COMMUNITY
Start: 2021-04-12 | End: 2021-06-10

## 2021-06-10 RX ORDER — TIOTROPIUM BROMIDE 18 UG/1
18 CAPSULE ORAL; RESPIRATORY (INHALATION)
Qty: 90 | Refills: 0 | Status: DISCONTINUED | COMMUNITY
Start: 2020-11-20 | End: 2021-06-10

## 2021-06-10 RX ORDER — FUROSEMIDE 40 MG/1
40 TABLET ORAL
Qty: 90 | Refills: 1 | Status: DISCONTINUED | COMMUNITY
End: 2021-06-10

## 2021-06-10 RX ORDER — CLOPIDOGREL BISULFATE 75 MG/1
75 TABLET, FILM COATED ORAL
Qty: 90 | Refills: 3 | Status: DISCONTINUED | COMMUNITY
Start: 2019-05-20 | End: 2021-06-10

## 2021-06-10 NOTE — ASSESSMENT
[FreeTextEntry1] : A/P:\par \par *HFpEF\par -reports chronic dyspnea w/ recent worsening over months.\par likely multifactorial (noncompliance w/ CPAP, morbid obesity w/ \par restrictive physiology on PFTs)\par -euvolemic on exam but may have mild hypervolemia given reported weight gain\par -lasix increased from 40 to 60 BID\par -CXR ordered\par -echo & lexiscan stress ordered\par -BNP, CBC & CMP ordered to be drawn 1-2 days prior to next visit\par -discussed compliance w/ fluid restriction salt restriction & CPAP compliance.\par \par *afib-persistent/permanent\par *s/p CVA-per MRI study '17-"mild chronic microvascular changes"\par *s/p watchman Apr '19-due to fall risk\par -off ASA d/t anemia but ideally ASA indefinitely is reccomended\par for Watchman (see Dr. Clark EP note May '19 in [facility]).\par -pt will followup w/ heme re: anemia & T-cytopenia prior \par to restarting ASA\par -cont. BB, CaB for rate control.\par \par *HTN\par -controlled\par \par \par Return 1 month.\par

## 2021-06-10 NOTE — HISTORY OF PRESENT ILLNESS
[FreeTextEntry1] : STONE DIAZ (STONE DIAZ)\par 1955(66y)M\par \par 67 y/o w/\par \par *HFpEF\par *afib-persistent/permanent\par *s/p CVA-per MRI study '17-"mild chronic microvascular changes"\par *s/p watchman Apr '19-due to fall risk\par *HTN\par *Obesity-BMI=40\par *VIJAY noncompliant w/ CPAP\par *ETOHism\par *recurrent falls s/p BKA\par \par here to establish care.\par last seen Jan '21.  changing cardiology providers\par b/c this office is closer.\par \par Previously seen by Dr. Daley ([facility]par At that time, it was noted that ASA & plavix were stopped\par 2/2 thromobocytopenia, planned to restart one of these agents\par pending repeat CBC & FU w/ heme. 3-4 months return visit planned\par to monitor volume status.\par \par Daughter present w/ pt.\par Since then, he has not followed up w/ heme as instructed.\par Explained to both of them importance of followup w/ their office\par for reevaluation of anemia & to confirm it is acceptable to start \par ASA given low platelets.  Note most recent platelets in 110s mildly reduced.\par \par Pt reports chronic dyspnea but possibly worse over past 1-2 months.\par Activity limited by amputation & chronic joint pain.\par Difficulty lying flat but this is chronic.  No PND \par Not using CPAP as instructed.  Daughter reports 20 pd wt gain\par in last 2 months. No chest pain, palpitations, syncope, lightheadness.\par Reviewed pulmnary note NOv '20.  PFTs showed restrictive physiology\par likely due to obesity.  Pt noncompliant w/ fluid & salt restriction (eats pretzels)\par & noncompliant w/ CPAP.\par \par reviewed chart, notes Amber in '18\par describe MRI w/ findings of "multiple T2 hyperintensities" \par "which are nonspecific but c/w possible thromboembolic event."\par Also noted by Dr. Daley in '18.\par \par *ECG Jun '21 afib rate controlled\par *MYA, TTE May '20: 55-60% mod TR, MR neg for veg,\par mild LVH mod LAE & MYRA PASP 30\par *Nuclear May '18 normal perfusion EF 60%\par *CT w/o coronaries (for watchman device) Mar '19: "cardiomegaly"\par  "triple vessel coronary calcification", prominent in distal LM & LAD.\par \par *TTE 10/29/18 shows low-normal LV function, mild MS, mod-sev MR, sev LAE, sev TR, sev pHTN. \par *MYA 05/2019 EF 60-65%, mod MR/TR, mod-sev pHTN, stable Watchman device with trace jose-device leak\par \par *MRI Nov '17 "scatterred white matter hyperintensities likely representing\par mild chronic microvascular changes"\par *Feb '21 Hb 12 plat 74 Cr 0.80 TSH WNL LFTs WNL\par BNP Dec '19 693, Dec '18 766 Oct '18 1240\par

## 2021-06-10 NOTE — PHYSICAL EXAM
[Well Developed] : well developed [Well Nourished] : well nourished [No Acute Distress] : no acute distress [Normal Conjunctiva] : normal conjunctiva [Normal Venous Pressure] : normal venous pressure [No Carotid Bruit] : no carotid bruit [Normal S1, S2] : normal S1, S2 [No Murmur] : no murmur [No Rub] : no rub [No Gallop] : no gallop [Clear Lung Fields] : clear lung fields [Good Air Entry] : good air entry [No Respiratory Distress] : no respiratory distress  [Soft] : abdomen soft [Non Tender] : non-tender [No Masses/organomegaly] : no masses/organomegaly [Normal Bowel Sounds] : normal bowel sounds [Normal Gait] : normal gait [No Edema] : no edema [No Cyanosis] : no cyanosis [No Clubbing] : no clubbing [No Varicosities] : no varicosities [No Rash] : no rash [No Skin Lesions] : no skin lesions [Moves all extremities] : moves all extremities [No Focal Deficits] : no focal deficits [Normal Speech] : normal speech [Alert and Oriented] : alert and oriented [Normal memory] : normal memory [de-identified] : morbidly obese [de-identified] : no JVP [de-identified] : no LE on R L leg BKA

## 2021-06-11 ENCOUNTER — APPOINTMENT (OUTPATIENT)
Dept: FAMILY MEDICINE | Facility: CLINIC | Age: 66
End: 2021-06-11
Payer: MEDICARE

## 2021-06-11 VITALS
SYSTOLIC BLOOD PRESSURE: 138 MMHG | HEIGHT: 72 IN | BODY MASS INDEX: 40.23 KG/M2 | WEIGHT: 297 LBS | HEART RATE: 96 BPM | DIASTOLIC BLOOD PRESSURE: 88 MMHG | OXYGEN SATURATION: 93 % | TEMPERATURE: 97.8 F

## 2021-06-11 PROCEDURE — 99214 OFFICE O/P EST MOD 30 MIN: CPT

## 2021-06-14 NOTE — PLAN
[FreeTextEntry1] : # Sleep study referral outpatient script - sleep apnea\par # Advised patient to follow up with hematologist to begin aspirin\par # See pulmonologist regarding ALANIZ if worsen and for bi-yearly appointment\par \par # Patient instructed to increase exercise regimen :\par - 10 minutes 1x day\par - use stationary bicycle\par - take 4 deep breaths every hour \par \par # Follow up in 3-4 months

## 2021-06-14 NOTE — ADDENDUM
[FreeTextEntry1] : I, Beth Colon, verify that that I acted solely as a scribe for Dr. Augusta Vasquez on this date, 06/11/2021.

## 2021-06-14 NOTE — ASSESSMENT
[FreeTextEntry1] : ASSESSMENT: Mr. STONE DIAZ is a 66 year old male presenting to the clinic today regarding a follow up. \par \par # PE/vitals obtained - normal\par \par # Reviewed patient's cardiologist and pulmonologist appointment findings\par \par # Medication reviewed\par  - Lasix 60 MG BID\par - Still taking Seroquil & Buspar\par

## 2021-06-14 NOTE — END OF VISIT
[FreeTextEntry2] : This note was written by SANJUANA LIU on 06/11/2021 , acting solely as a scribe for Dr. Augusta Vasquez MD. \par \par All medical record entries made by the scribe, SANJUANA LIU, were at my, Dr. Augusta Vasquez MD, direction and personally dictated by me on 06/11/2021 . I have personally reviewed the chart and agree that the record accurately reflects my personal performance and care.

## 2021-06-22 ENCOUNTER — OUTPATIENT (OUTPATIENT)
Dept: OUTPATIENT SERVICES | Facility: HOSPITAL | Age: 66
LOS: 1 days | End: 2021-06-22
Payer: MEDICARE

## 2021-06-22 ENCOUNTER — APPOINTMENT (OUTPATIENT)
Dept: RADIOLOGY | Facility: CLINIC | Age: 66
End: 2021-06-22
Payer: MEDICARE

## 2021-06-22 DIAGNOSIS — S88.119A COMPLETE TRAUMATIC AMPUTATION AT LEVEL BETWEEN KNEE AND ANKLE, UNSPECIFIED LOWER LEG, INITIAL ENCOUNTER: Chronic | ICD-10-CM

## 2021-06-22 DIAGNOSIS — Z93.1 GASTROSTOMY STATUS: Chronic | ICD-10-CM

## 2021-06-22 DIAGNOSIS — Z89.512 ACQUIRED ABSENCE OF LEFT LEG BELOW KNEE: Chronic | ICD-10-CM

## 2021-06-22 DIAGNOSIS — R06.00 DYSPNEA, UNSPECIFIED: ICD-10-CM

## 2021-06-22 DIAGNOSIS — Z98.890 OTHER SPECIFIED POSTPROCEDURAL STATES: Chronic | ICD-10-CM

## 2021-06-22 DIAGNOSIS — Z95.818 PRESENCE OF OTHER CARDIAC IMPLANTS AND GRAFTS: Chronic | ICD-10-CM

## 2021-06-22 PROCEDURE — 71046 X-RAY EXAM CHEST 2 VIEWS: CPT | Mod: 26

## 2021-06-22 PROCEDURE — 71046 X-RAY EXAM CHEST 2 VIEWS: CPT

## 2021-06-24 ENCOUNTER — RX RENEWAL (OUTPATIENT)
Age: 66
End: 2021-06-24

## 2021-06-26 ENCOUNTER — RX RENEWAL (OUTPATIENT)
Age: 66
End: 2021-06-26

## 2021-06-28 ENCOUNTER — RX RENEWAL (OUTPATIENT)
Age: 66
End: 2021-06-28

## 2021-06-30 ENCOUNTER — OUTPATIENT (OUTPATIENT)
Dept: OUTPATIENT SERVICES | Facility: HOSPITAL | Age: 66
LOS: 1 days | End: 2021-06-30
Payer: MEDICARE

## 2021-06-30 DIAGNOSIS — S88.119A COMPLETE TRAUMATIC AMPUTATION AT LEVEL BETWEEN KNEE AND ANKLE, UNSPECIFIED LOWER LEG, INITIAL ENCOUNTER: Chronic | ICD-10-CM

## 2021-06-30 DIAGNOSIS — Z95.818 PRESENCE OF OTHER CARDIAC IMPLANTS AND GRAFTS: Chronic | ICD-10-CM

## 2021-06-30 DIAGNOSIS — R06.00 DYSPNEA, UNSPECIFIED: ICD-10-CM

## 2021-06-30 DIAGNOSIS — Z98.890 OTHER SPECIFIED POSTPROCEDURAL STATES: Chronic | ICD-10-CM

## 2021-06-30 DIAGNOSIS — Z93.1 GASTROSTOMY STATUS: Chronic | ICD-10-CM

## 2021-06-30 DIAGNOSIS — Z89.512 ACQUIRED ABSENCE OF LEFT LEG BELOW KNEE: Chronic | ICD-10-CM

## 2021-06-30 PROCEDURE — 93306 TTE W/DOPPLER COMPLETE: CPT

## 2021-06-30 PROCEDURE — 93306 TTE W/DOPPLER COMPLETE: CPT | Mod: 26

## 2021-07-01 DIAGNOSIS — R06.00 DYSPNEA, UNSPECIFIED: ICD-10-CM

## 2021-07-07 ENCOUNTER — APPOINTMENT (OUTPATIENT)
Dept: CARDIOLOGY | Facility: CLINIC | Age: 66
End: 2021-07-07
Payer: MEDICARE

## 2021-07-07 VITALS
HEIGHT: 72 IN | SYSTOLIC BLOOD PRESSURE: 135 MMHG | HEART RATE: 92 BPM | DIASTOLIC BLOOD PRESSURE: 68 MMHG | OXYGEN SATURATION: 94 % | WEIGHT: 295 LBS | BODY MASS INDEX: 39.96 KG/M2

## 2021-07-07 PROCEDURE — 93000 ELECTROCARDIOGRAM COMPLETE: CPT

## 2021-07-07 PROCEDURE — 99214 OFFICE O/P EST MOD 30 MIN: CPT

## 2021-07-13 ENCOUNTER — RX RENEWAL (OUTPATIENT)
Age: 66
End: 2021-07-13

## 2021-07-24 NOTE — ASSESSMENT
[FreeTextEntry1] : \par A/P:\par \par -Advised to schedule lexiscan stress test to exclude ischemia.\par -lasix increased from 60 to 80 mg BID \par -basic chem b/f next visit in 1 month.\par \par \par Return 1 month.\par

## 2021-07-24 NOTE — HISTORY OF PRESENT ILLNESS
[FreeTextEntry1] : STONE DIAZ (STONE V JOE)\par 1955(66y)M\par \par 67 y/o w/\par \par *HFpEF\par *afib-persistent/permanent\par *s/p CVA-per MRI study '17-"mild chronic microvascular changes"\par *s/p watchman Apr '19-due to fall risk\par *HTN\par *Obesity-BMI=40\par *VIJAY noncompliant w/ CPAP\par *ETOHism\par *recurrent falls s/p BKA\par  \par here for followup.\par Reviewed results of echo:\par *Echo Jun '21: EF 60-65%,  severe LAE, eccentric MR, mod TR, mod pul HTN,\par CXR also reviewed, "cardiomegaly, pulmonary edema has improved"\par labs reviewed:  (0-300), bsic chem WNL, \par has not completed lexiscan stress test yet.\par has not met w/ heme re: anemia.\par \par No change in symptoms: persistent dyspnea but no chest pain, \par lightheadness, syncope palpitations.\par \par \par *ECG Jun '21 afib rate controlled\par *MYA, TTE May '20: 55-60% mod TR, MR neg for veg,\par mild LVH mod LAE & MYRA PASP 30\par *Nuclear May '18 normal perfusion EF 60%\par *CT w/o coronaries (for watchman device) Mar '19: "cardiomegaly"\par  "triple vessel coronary calcification", prominent in distal LM & LAD.\par \par *TTE 10/29/18 shows low-normal LV function, mild MS, mod-sev MR, sev LAE, sev TR, sev pHTN. \par *MYA 05/2019 EF 60-65%, mod MR/TR, mod-sev pHTN, stable Watchman device with trace jose-device leak\par \par *MRI Nov '17 "scatterred white matter hyperintensities likely representing\par mild chronic microvascular changes"\par *Feb '21 Hb 12 plat 74 Cr 0.80 TSH WNL LFTs WNL\par BNP Dec '19 693, Dec '18 766 Oct '18 1240\par

## 2021-07-24 NOTE — PHYSICAL EXAM
[Well Developed] : well developed [Well Nourished] : well nourished [No Acute Distress] : no acute distress [Normal Conjunctiva] : normal conjunctiva [Normal Venous Pressure] : normal venous pressure [No Carotid Bruit] : no carotid bruit [No Murmur] : no murmur [Normal S1, S2] : normal S1, S2 [No Rub] : no rub [No Gallop] : no gallop [Clear Lung Fields] : clear lung fields [Good Air Entry] : good air entry [No Respiratory Distress] : no respiratory distress  [Soft] : abdomen soft [Non Tender] : non-tender [Normal Bowel Sounds] : normal bowel sounds [No Masses/organomegaly] : no masses/organomegaly [Normal Gait] : normal gait [No Edema] : no edema [No Cyanosis] : no cyanosis [No Clubbing] : no clubbing [No Varicosities] : no varicosities [No Rash] : no rash [No Skin Lesions] : no skin lesions [Moves all extremities] : moves all extremities [Normal Speech] : normal speech [No Focal Deficits] : no focal deficits [Alert and Oriented] : alert and oriented [Normal memory] : normal memory

## 2021-08-04 ENCOUNTER — RX RENEWAL (OUTPATIENT)
Age: 66
End: 2021-08-04

## 2021-08-05 ENCOUNTER — APPOINTMENT (OUTPATIENT)
Dept: INTERNAL MEDICINE | Facility: CLINIC | Age: 66
End: 2021-08-05
Payer: MEDICARE

## 2021-08-05 ENCOUNTER — NON-APPOINTMENT (OUTPATIENT)
Age: 66
End: 2021-08-05

## 2021-08-05 VITALS
SYSTOLIC BLOOD PRESSURE: 128 MMHG | HEIGHT: 72 IN | TEMPERATURE: 96.7 F | BODY MASS INDEX: 41.04 KG/M2 | HEART RATE: 84 BPM | RESPIRATION RATE: 18 BRPM | DIASTOLIC BLOOD PRESSURE: 78 MMHG | WEIGHT: 303 LBS | OXYGEN SATURATION: 93 %

## 2021-08-05 PROCEDURE — 99214 OFFICE O/P EST MOD 30 MIN: CPT | Mod: 25

## 2021-08-05 PROCEDURE — 94010 BREATHING CAPACITY TEST: CPT

## 2021-08-06 NOTE — DISCUSSION/SUMMARY
[FreeTextEntry1] : Mr. Boone presents for followup evaluation accompanied by his daughter.\par \par #1. Have recommended the patient having on her polysomnography examination. He has a history of obstructive sleep apnea but has not worn a CPAP mask. He almost certainly has significant nocturnal hypoxemia. Patient does not wish to have a polysomnography examination so he will be scheduled for an overnight oximetry study.\par \par #2. Patient has moderate pulmonary hypertension with a pulmonary artery pressure of 54. This is most likely multifactorial with the major component being significant obstructive sleep apnea with hypoxemia.\par \par #3. Patient will continue on current metered dose inhaler therapy.\par \par #4. I again counseled the patient and weight loss reduction with lifestyle modification using diet and exercise. Patient insists that he takes in very low calorie wise.\par \par #5. Followup as scheduled

## 2021-08-06 NOTE — HISTORY OF PRESENT ILLNESS
[TextBox_4] : Mr. Boone presents for followup evaluation accompanied by his daughter. Patient is a 66-year-old morbidly obese male with a history of obstructive sleep apnea and COPD. He is complaining of significant shortness of breath with even minimal exertion. He has a left AKA. Patient has a history of obstructive sleep apnea, however, he does not wear CPAP. He does continue Symbicort 160/12.5 mcg 2 puffs b.i.d., Spiriva one puff daily and albuterol as needed.

## 2021-08-06 NOTE — PHYSICAL EXAM
[No Acute Distress] : no acute distress [Normal Oropharynx] : normal oropharynx [Normal Appearance] : normal appearance [No Neck Mass] : no neck mass [Normal Rate/Rhythm] : normal rate/rhythm [Normal S1, S2] : normal s1, s2 [No Murmurs] : no murmurs [No Resp Distress] : no resp distress [Clear to Auscultation Bilaterally] : clear to auscultation bilaterally [No Abnormalities] : no abnormalities [Benign] : benign [Normal Gait] : normal gait [No Clubbing] : no clubbing [No Cyanosis] : no cyanosis [No Edema] : no edema [FROM] : FROM [Normal Color/ Pigmentation] : normal color/ pigmentation [No Focal Deficits] : no focal deficits [Oriented x3] : oriented x3 [Normal Affect] : normal affect [TextBox_99] : left AKA

## 2021-08-06 NOTE — PROCEDURE
[FreeTextEntry1] : Spirometry shows mixed restrictive/obstructive lung disease. FEV1 is 1.44 L which is 39% predicted. FVC is 2.18 L which is 44% predicted. FEV1/FVC ratio is 66%.

## 2021-08-09 ENCOUNTER — RX RENEWAL (OUTPATIENT)
Age: 66
End: 2021-08-09

## 2021-08-13 ENCOUNTER — RX RENEWAL (OUTPATIENT)
Age: 66
End: 2021-08-13

## 2021-08-24 ENCOUNTER — RX RENEWAL (OUTPATIENT)
Age: 66
End: 2021-08-24

## 2021-09-01 ENCOUNTER — RX RENEWAL (OUTPATIENT)
Age: 66
End: 2021-09-01

## 2021-09-13 ENCOUNTER — APPOINTMENT (OUTPATIENT)
Dept: FAMILY MEDICINE | Facility: CLINIC | Age: 66
End: 2021-09-13
Payer: MEDICARE

## 2021-09-13 VITALS
SYSTOLIC BLOOD PRESSURE: 124 MMHG | HEART RATE: 82 BPM | DIASTOLIC BLOOD PRESSURE: 82 MMHG | WEIGHT: 300 LBS | BODY MASS INDEX: 40.63 KG/M2 | HEIGHT: 72 IN | TEMPERATURE: 97.6 F | OXYGEN SATURATION: 92 %

## 2021-09-13 DIAGNOSIS — G54.6 PHANTOM LIMB SYNDROME WITH PAIN: Chronic | ICD-10-CM

## 2021-09-13 DIAGNOSIS — Z23 ENCOUNTER FOR IMMUNIZATION: ICD-10-CM

## 2021-09-13 PROCEDURE — 99214 OFFICE O/P EST MOD 30 MIN: CPT

## 2021-09-13 PROCEDURE — G0009: CPT

## 2021-09-13 PROCEDURE — 90732 PPSV23 VACC 2 YRS+ SUBQ/IM: CPT

## 2021-09-14 ENCOUNTER — NON-APPOINTMENT (OUTPATIENT)
Age: 66
End: 2021-09-14

## 2021-09-14 NOTE — ADDENDUM
[FreeTextEntry1] : I, Jennie Cooper acting as a scribe for Dr. Augusta Vasquez on Sep 13, 2021  at 5:00 PM\par

## 2021-09-14 NOTE — HISTORY OF PRESENT ILLNESS
[Family Member] : family member [FreeTextEntry1] : Follow up CHF/COPD [de-identified] : STONE is a 66 year male here for CHF and COPD follow up. Currently being followed by pulmonary and cardiology. Lasix increased to 80 mg BID per cardiology. Recently had a Polysomnography, awaiting results. He is scheduled to see GI next month. States he has been trying to follow a good diet. Limits his alcohol intake to one glass of wine a night. \par

## 2021-09-14 NOTE — PLAN
[FreeTextEntry1] : \par - Pneumovax 23 in office today \par - Blood work ordered to be done with [facility] \par - Encouraged weight loss effort \par - Advised keeping an exercise journal \par - Keep following up with specialists \par - C/w medication regimen

## 2021-09-14 NOTE — END OF VISIT
[FreeTextEntry3] : Medical record entries made by the scribe today today, were at my direction and personally dictated to them by me, Dr. Augusta Vasquez on Sep 13, 2021. I have reviewed the chart and agree that the record accurately reflects my personal performance of the history, physical exam, assessment, and plan.\par

## 2021-10-07 ENCOUNTER — APPOINTMENT (OUTPATIENT)
Dept: GASTROENTEROLOGY | Facility: CLINIC | Age: 66
End: 2021-10-07
Payer: MEDICARE

## 2021-10-07 VITALS — BODY MASS INDEX: 43.09 KG/M2 | WEIGHT: 301 LBS | HEIGHT: 70 IN

## 2021-10-07 DIAGNOSIS — D64.9 ANEMIA, UNSPECIFIED: ICD-10-CM

## 2021-10-07 PROCEDURE — 99203 OFFICE O/P NEW LOW 30 MIN: CPT

## 2021-10-18 ENCOUNTER — OUTPATIENT (OUTPATIENT)
Dept: OUTPATIENT SERVICES | Facility: HOSPITAL | Age: 66
LOS: 1 days | End: 2021-10-18
Payer: MEDICARE

## 2021-10-18 DIAGNOSIS — Z98.890 OTHER SPECIFIED POSTPROCEDURAL STATES: Chronic | ICD-10-CM

## 2021-10-18 DIAGNOSIS — R06.00 DYSPNEA, UNSPECIFIED: ICD-10-CM

## 2021-10-18 DIAGNOSIS — Z93.1 GASTROSTOMY STATUS: Chronic | ICD-10-CM

## 2021-10-18 DIAGNOSIS — Z89.512 ACQUIRED ABSENCE OF LEFT LEG BELOW KNEE: Chronic | ICD-10-CM

## 2021-10-18 DIAGNOSIS — Z95.818 PRESENCE OF OTHER CARDIAC IMPLANTS AND GRAFTS: Chronic | ICD-10-CM

## 2021-10-18 DIAGNOSIS — S88.119A COMPLETE TRAUMATIC AMPUTATION AT LEVEL BETWEEN KNEE AND ANKLE, UNSPECIFIED LOWER LEG, INITIAL ENCOUNTER: Chronic | ICD-10-CM

## 2021-10-18 PROCEDURE — 93016 CV STRESS TEST SUPVJ ONLY: CPT

## 2021-10-18 PROCEDURE — 93018 CV STRESS TEST I&R ONLY: CPT

## 2021-10-18 PROCEDURE — 93017 CV STRESS TEST TRACING ONLY: CPT

## 2021-10-18 RX ORDER — REGADENOSON 0.08 MG/ML
0.4 INJECTION, SOLUTION INTRAVENOUS ONCE
Refills: 0 | Status: COMPLETED | OUTPATIENT
Start: 2021-10-18 | End: 2021-10-18

## 2021-10-18 RX ADMIN — REGADENOSON 0.4 MILLIGRAM(S): 0.08 INJECTION, SOLUTION INTRAVENOUS at 09:05

## 2021-10-19 ENCOUNTER — OUTPATIENT (OUTPATIENT)
Dept: OUTPATIENT SERVICES | Facility: HOSPITAL | Age: 66
LOS: 1 days | End: 2021-10-19

## 2021-10-19 ENCOUNTER — RESULT REVIEW (OUTPATIENT)
Age: 66
End: 2021-10-19

## 2021-10-19 DIAGNOSIS — Z89.512 ACQUIRED ABSENCE OF LEFT LEG BELOW KNEE: Chronic | ICD-10-CM

## 2021-10-19 DIAGNOSIS — R06.00 DYSPNEA, UNSPECIFIED: ICD-10-CM

## 2021-10-19 DIAGNOSIS — Z95.818 PRESENCE OF OTHER CARDIAC IMPLANTS AND GRAFTS: Chronic | ICD-10-CM

## 2021-10-19 DIAGNOSIS — S88.119A COMPLETE TRAUMATIC AMPUTATION AT LEVEL BETWEEN KNEE AND ANKLE, UNSPECIFIED LOWER LEG, INITIAL ENCOUNTER: Chronic | ICD-10-CM

## 2021-10-19 DIAGNOSIS — Z98.890 OTHER SPECIFIED POSTPROCEDURAL STATES: Chronic | ICD-10-CM

## 2021-10-19 DIAGNOSIS — Z93.1 GASTROSTOMY STATUS: Chronic | ICD-10-CM

## 2021-10-19 PROCEDURE — 78452 HT MUSCLE IMAGE SPECT MULT: CPT | Mod: 26

## 2021-10-21 ENCOUNTER — APPOINTMENT (OUTPATIENT)
Dept: CARDIOLOGY | Facility: CLINIC | Age: 66
End: 2021-10-21

## 2021-10-21 ENCOUNTER — APPOINTMENT (OUTPATIENT)
Dept: CARDIOLOGY | Facility: CLINIC | Age: 66
End: 2021-10-21
Payer: MEDICARE

## 2021-10-21 ENCOUNTER — NON-APPOINTMENT (OUTPATIENT)
Age: 66
End: 2021-10-21

## 2021-10-21 VITALS
BODY MASS INDEX: 40.36 KG/M2 | HEIGHT: 72 IN | RESPIRATION RATE: 18 BRPM | DIASTOLIC BLOOD PRESSURE: 73 MMHG | OXYGEN SATURATION: 95 % | HEART RATE: 65 BPM | SYSTOLIC BLOOD PRESSURE: 134 MMHG | WEIGHT: 298 LBS

## 2021-10-21 PROCEDURE — 99214 OFFICE O/P EST MOD 30 MIN: CPT

## 2021-10-21 PROCEDURE — 93000 ELECTROCARDIOGRAM COMPLETE: CPT

## 2021-10-21 RX ORDER — QUETIAPINE FUMARATE 50 MG/1
50 TABLET ORAL DAILY
Qty: 90 | Refills: 3 | Status: DISCONTINUED | COMMUNITY
End: 2021-10-21

## 2021-10-21 NOTE — HISTORY OF PRESENT ILLNESS
[FreeTextEntry1] : STONE DIAZ (STONE V JOE)\par 1955(66y)M\par \par 65 y/o w/\par \par *HFpEF\par *afib-persistent/permanent\par *s/p CVA-per MRI study '17-"mild chronic microvascular changes"\par *s/p watchman Apr '19-due to fall risk\par *HTN\par *Obesity-BMI=40\par *VIJAY noncompliant w/ CPAP\par *ETOHism\par *recurrent falls s/p BKA\par \par Reviewed results of testing:\par nuclear stress w/ normal perfusion &  normal EF.\par basic chem ordered by PCP on lasix 80 w/ normal K & Cr\par Dyspnea & R LE edema have improved c/w last visit\par after lasix increase\par Asking about restarting ASA, reviewed platelets & Hb both WNL.\par \par *ECG Jun '21 afib rate controlled\par *MYA, TTE May '20: 55-60% mod TR, MR neg for veg,\par mild LVH mod LAE & MYRA PASP 30\par *Nuclear May '18 normal perfusion EF 60%\par *CT w/o coronaries (for watchman device) Mar '19: "cardiomegaly"\par  "triple vessel coronary calcification", prominent in distal LM & LAD.\par *Nuclear-lexiscan Oct '21: normal perfusion, normal EF.\par *TTE 10/29/18 shows low-normal LV function, mild MS, mod-sev MR, sev LAE, sev TR, sev pHTN. \par *MYA 05/2019 EF 60-65%, mod MR/TR, mod-sev pHTN, stable Watchman device with trace jose-device leak\par \par *MRI Nov '17 "scatterred white matter hyperintensities likely representing\par mild chronic microvascular changes"\par *Feb '21 Hb 12 plat 74 Cr 0.80 TSH WNL LFTs WNL\par BNP Dec '19 693, Dec '18 766 Oct '18 1240\par \par

## 2021-10-21 NOTE — ASSESSMENT
[FreeTextEntry1] : A/P:\par \par A/P:\par \par *HFpEF\par -reports chronic dyspnea w/ recent worsening over months.\par likely multifactorial (noncompliance w/ CPAP, morbid obesity w/ \par restrictive physiology on PFTs)\par -euvolemic today - cont. lasix at 80mg BID & moustapha 25 daily\par \par *afib-persistent/permanent\par *s/p CVA-per MRI study '17-"mild chronic microvascular changes"\par *s/p watchman Apr '19-due to fall risk\par -cont. BB, CaB for rate control.\par -Was off ASA 2/2 anemia & low plat.\par -most recent CBC w/ normal Hb & plat\par -Reccomend restarting ASA\par \par *HTN\par -controlled\par \par Return 3 months

## 2021-10-25 ENCOUNTER — RX RENEWAL (OUTPATIENT)
Age: 66
End: 2021-10-25

## 2021-11-01 ENCOUNTER — APPOINTMENT (OUTPATIENT)
Dept: FAMILY MEDICINE | Facility: CLINIC | Age: 66
End: 2021-11-01
Payer: MEDICARE

## 2021-11-01 VITALS
DIASTOLIC BLOOD PRESSURE: 82 MMHG | HEIGHT: 72 IN | TEMPERATURE: 97.6 F | HEART RATE: 78 BPM | WEIGHT: 298 LBS | OXYGEN SATURATION: 95 % | SYSTOLIC BLOOD PRESSURE: 134 MMHG | BODY MASS INDEX: 40.36 KG/M2

## 2021-11-01 DIAGNOSIS — L03.119 CELLULITIS OF UNSPECIFIED PART OF LIMB: ICD-10-CM

## 2021-11-01 PROCEDURE — 99214 OFFICE O/P EST MOD 30 MIN: CPT

## 2021-11-01 NOTE — ADDENDUM
[FreeTextEntry1] : I, Jennie Cooper acting as a scribe for Dr. Augusta Vasquez on Nov 01, 2021  at 3:37 PM\par

## 2021-11-01 NOTE — HISTORY OF PRESENT ILLNESS
[FreeTextEntry8] : Patient states he had a water blister on his right ankle which popped, states he peeled the skin off which then led to two bleeding open wounds. States this all occurred four days ago. Feels it has worsened over the weekend. Patient has been having home nurse attend to the wounds.

## 2021-11-01 NOTE — END OF VISIT
[FreeTextEntry3] : Medical record entries made by the scribe today today, were at my direction and personally dictated to them by me, Dr. Augusta Vasquez on Nov 01, 2021. I have reviewed the chart and agree that the record accurately reflects my personal performance of the history, physical exam, assessment, and plan.\par

## 2021-11-01 NOTE — PROGRESS NOTE ADULT - SUBJECTIVE AND OBJECTIVE BOX
Cataract OU:  Visually Significant discussed the risks benefits alternatives and limitations of cataract surgery. The patient stated a full understanding and a desire to proceed with the procedure. The patient will need to return for preop appointment with cataract measurements and to have any additional questions answered and start pre-operative eye drops as directed. Phaco PCLOtherwise follow-up Patient is a 62y old  Male who presents with a chief complaint of Shortness of breath (30 Oct 2017 15:03)      BRIEF HOSPITAL COURSE: 61 yo male, PMHx COPD, AFib not on AC, CHF, alcoholic cirrhosis, EtOH abuse, EtOH withdrawal (intubated in past for airway protection), initially admitted on 10/28 for COPD exacerbation and alcohol intoxication, signed out AMA and was found later in hospital disoriented. Readmitted and RRT called for development of acute EtOH withdrawal. Transferred to MICU for further management of DTs. Patient subsequently became obtunded, lost ability to protect airway, and was intubated. CT head neg for acute events. Hospital course complicated by AFib with RVR, sepsis, Pseudomonas PNA, aspiration pneumonia. Self extubated on 11/2 without need for reintubation initially, but on the morning of 11/4 patient became obtunded with difficulty managing secretions requiring reintubation. Pt has had recurrent ruptured  balloons and ETT breakage, with multiple failed weaning trials. S/p tracheostomy 11/9 and PEG 11/22. Readmitted to the ICU secondary to for fever r/o sepsis and recurrent AFib with RVR.    Events last 24 hours: Afebrile. HR stable, no further episodes of RVR or hypotension. Tolerated trach collar for most of day, on CPAP overnight      PAST MEDICAL & SURGICAL HISTORY:  Falls  Meningitis  Collapsed lung  Alcohol withdrawal  Emphysema of lung  ETOH abuse  Cirrhosis  Afib  CHF (congestive heart failure)  Poor historian  Alcohol abuse  Chronic atrial fibrillation  S/P BKA (below knee amputation), left: secondary to worsening infection in lower leg. Had both ankle injuries from fall s/p repair - left had complication.  S/P BKA (below knee amputation) unilateral, left      Review of Systems:  CONSTITUTIONAL: No fever, chills, or fatigue  EYES: No eye pain, visual disturbances, or discharge  ENMT:  No difficulty hearing, tinnitus, vertigo; No sinus or throat pain  NECK: No pain or stiffness  RESPIRATORY: No cough, wheezing, chills or hemoptysis; No shortness of breath  CARDIOVASCULAR: No chest pain, palpitations, dizziness, or leg swelling  GASTROINTESTINAL: No abdominal or epigastric pain. No nausea, vomiting, or hematemesis; No diarrhea or constipation. No melena or hematochezia.  GENITOURINARY: No dysuria, frequency, hematuria, or incontinence  NEUROLOGICAL: No headaches, memory loss, loss of strength, numbness, or tremors  SKIN: No itching, burning, rashes, or lesions   MUSCULOSKELETAL: No joint pain or swelling; No muscle, back, or extremity pain  PSYCHIATRIC: No depression, anxiety, mood swings, or difficulty sleeping      Medications:    digoxin     Tablet 0.125 milliGRAM(s) Oral daily  diltiazem    Tablet 60 milliGRAM(s) Oral every 6 hours  lisinopril 5 milliGRAM(s) Oral daily  metoprolol     tartrate 25 milliGRAM(s) Oral every 8 hours  metoprolol    tartrate Injectable 5 milliGRAM(s) IV Push every 6 hours PRN  tamsulosin 0.4 milliGRAM(s) Oral at bedtime    ALBUTerol/ipratropium for Nebulization 3 milliLiter(s) Nebulizer every 6 hours    acetaminophen    Suspension 650 milliGRAM(s) Oral every 6 hours PRN  fentaNYL    Injectable 25 MICROGram(s) IV Push every 4 hours PRN  haloperidol    Injectable 2 milliGRAM(s) IV Push every 6 hours PRN  OLANZapine 2.5 milliGRAM(s) Oral daily  OLANZapine 5 milliGRAM(s) Oral at bedtime      enoxaparin Injectable 40 milliGRAM(s) SubCutaneous every 24 hours    bisacodyl Suppository 10 milliGRAM(s) Rectal daily PRN  lactulose Syrup 10 Gram(s) Oral daily  pantoprazole  Injectable 40 milliGRAM(s) IV Push daily        folic acid 1 milliGRAM(s) Oral daily  multivitamin 1 Tablet(s) Oral daily    influenza   Vaccine 0.5 milliLiter(s) IntraMuscular once    chlorhexidine 0.12% Liquid 15 milliLiter(s) Swish and Spit two times a day    nicotine - 21 mG/24Hr(s) Patch 1 patch Transdermal daily      Mode: CPAP with PS  FiO2: 30  PEEP: 5  PS: 10  MAP: 8      ICU Vital Signs Last 24 Hrs  T(C): 37.3 (27 Nov 2017 00:15), Max: 37.4 (26 Nov 2017 04:00)  T(F): 99.2 (27 Nov 2017 00:15), Max: 99.3 (26 Nov 2017 04:00)  HR: 95 (27 Nov 2017 02:00) (70 - 102)  BP: 125/60 (27 Nov 2017 02:00) (95/51 - 153/66)  BP(mean): 83 (27 Nov 2017 02:00) (67 - 96)  ABP: --  ABP(mean): --  RR: 36 (27 Nov 2017 02:00) (15 - 39)  SpO2: 100% (27 Nov 2017 02:00) (96% - 100%)          I&O's Detail    25 Nov 2017 07:01  -  26 Nov 2017 07:00  --------------------------------------------------------  IN:    Enteral Tube Flush: 180 mL    Free Water: 1170 mL    Glucerna: 1320 mL    Solution: 1000 mL  Total IN: 3670 mL    OUT:    Incontinent per Condom Catheter: 1725 mL  Total OUT: 1725 mL    Total NET: 1945 mL      26 Nov 2017 07:01  -  27 Nov 2017 03:09  --------------------------------------------------------  IN:    Enteral Tube Flush: 110 mL    Free Water: 750 mL    Glucerna: 1200 mL    Solution: 250 mL  Total IN: 2310 mL    OUT:    Incontinent per Condom Catheter: 130 mL    Voided: 160 mL  Total OUT: 290 mL    Total NET: 2020 mL            LABS:                        10.0   7.7   )-----------( 261      ( 25 Nov 2017 06:10 )             31.6     11-25    142  |  105  |  17.0  ----------------------------<  112  4.5   |  26.0  |  0.47<L>    Ca    9.0      25 Nov 2017 06:10  Phos  4.1     11-25  Mg     2.0     11-25            CAPILLARY BLOOD GLUCOSE            CULTURES:      Physical Examination:    General: No acute distress.      HEENT: Pupils 3mm equal, sluggish reactive to light. Symmetric.    PULM: Trach to vent on CPAP. Diminished to auscultation bilaterally, no respiratory distress    CVS: AFib regular rate, no murmurs, rubs, or gallops    ABD: Soft, nondistended, nontender, normoactive bowel sounds, no masses. PEG in place.    EXT: L BKA. No edema, nontender    SKIN: Warm and well perfused, no rashes noted.    NEURO: RASS 0, awake and alert, attempts to communicate verbally    RADIOLOGY: none recent      CRITICAL CARE TIME SPENT: I have spent 30 minutes of critical care time evaluating and treating this patient, including reviewing charts, labs, imagining studies, and collaborating with interdisciplinary team.

## 2021-11-01 NOTE — PLAN
[FreeTextEntry1] : \par - Sending patient to wound care center, referral provided \par - Start Clindamycin 300 mg \par - Keep leg elevated and limit salt intake

## 2021-11-05 ENCOUNTER — APPOINTMENT (OUTPATIENT)
Dept: WOUND CARE | Facility: HOSPITAL | Age: 66
End: 2021-11-05

## 2021-11-09 ENCOUNTER — APPOINTMENT (OUTPATIENT)
Dept: GASTROENTEROLOGY | Facility: CLINIC | Age: 66
End: 2021-11-09

## 2021-11-09 ENCOUNTER — INPATIENT (INPATIENT)
Facility: HOSPITAL | Age: 66
LOS: 5 days | Discharge: HOME | DRG: 177 | End: 2021-11-15
Attending: INTERNAL MEDICINE | Admitting: INTERNAL MEDICINE
Payer: MEDICARE

## 2021-11-09 VITALS
TEMPERATURE: 98 F | RESPIRATION RATE: 20 BRPM | SYSTOLIC BLOOD PRESSURE: 94 MMHG | OXYGEN SATURATION: 94 % | HEIGHT: 67 IN | DIASTOLIC BLOOD PRESSURE: 52 MMHG | WEIGHT: 289.91 LBS | HEART RATE: 124 BPM

## 2021-11-09 DIAGNOSIS — Z93.1 GASTROSTOMY STATUS: Chronic | ICD-10-CM

## 2021-11-09 DIAGNOSIS — Z95.818 PRESENCE OF OTHER CARDIAC IMPLANTS AND GRAFTS: Chronic | ICD-10-CM

## 2021-11-09 DIAGNOSIS — Z89.512 ACQUIRED ABSENCE OF LEFT LEG BELOW KNEE: Chronic | ICD-10-CM

## 2021-11-09 DIAGNOSIS — I48.91 UNSPECIFIED ATRIAL FIBRILLATION: ICD-10-CM

## 2021-11-09 DIAGNOSIS — S88.119A COMPLETE TRAUMATIC AMPUTATION AT LEVEL BETWEEN KNEE AND ANKLE, UNSPECIFIED LOWER LEG, INITIAL ENCOUNTER: Chronic | ICD-10-CM

## 2021-11-09 DIAGNOSIS — Z98.890 OTHER SPECIFIED POSTPROCEDURAL STATES: Chronic | ICD-10-CM

## 2021-11-09 LAB
ALBUMIN SERPL ELPH-MCNC: 3.2 G/DL — LOW (ref 3.3–5)
ALP SERPL-CCNC: 106 U/L — SIGNIFICANT CHANGE UP (ref 40–120)
ALT FLD-CCNC: 18 U/L — SIGNIFICANT CHANGE UP (ref 12–78)
ANION GAP SERPL CALC-SCNC: 8 MMOL/L — SIGNIFICANT CHANGE UP (ref 5–17)
APPEARANCE UR: CLEAR — SIGNIFICANT CHANGE UP
APTT BLD: 31.5 SEC — SIGNIFICANT CHANGE UP (ref 27.5–35.5)
AST SERPL-CCNC: 20 U/L — SIGNIFICANT CHANGE UP (ref 15–37)
BASOPHILS # BLD AUTO: 0 K/UL — SIGNIFICANT CHANGE UP (ref 0–0.2)
BASOPHILS NFR BLD AUTO: 0 % — SIGNIFICANT CHANGE UP (ref 0–2)
BILIRUB SERPL-MCNC: 1.1 MG/DL — SIGNIFICANT CHANGE UP (ref 0.2–1.2)
BILIRUB UR-MCNC: NEGATIVE — SIGNIFICANT CHANGE UP
BUN SERPL-MCNC: 27 MG/DL — HIGH (ref 7–23)
CALCIUM SERPL-MCNC: 8.5 MG/DL — SIGNIFICANT CHANGE UP (ref 8.5–10.1)
CHLORIDE SERPL-SCNC: 96 MMOL/L — SIGNIFICANT CHANGE UP (ref 96–108)
CO2 SERPL-SCNC: 28 MMOL/L — SIGNIFICANT CHANGE UP (ref 22–31)
COLOR SPEC: YELLOW — SIGNIFICANT CHANGE UP
CREAT SERPL-MCNC: 1.85 MG/DL — HIGH (ref 0.5–1.3)
DIFF PNL FLD: ABNORMAL
EOSINOPHIL # BLD AUTO: 0 K/UL — SIGNIFICANT CHANGE UP (ref 0–0.5)
EOSINOPHIL NFR BLD AUTO: 0 % — SIGNIFICANT CHANGE UP (ref 0–6)
ETHANOL SERPL-MCNC: <10 MG/DL — SIGNIFICANT CHANGE UP (ref 0–10)
GLUCOSE SERPL-MCNC: 146 MG/DL — HIGH (ref 70–99)
GLUCOSE UR QL: NEGATIVE MG/DL — SIGNIFICANT CHANGE UP
HCT VFR BLD CALC: 40.3 % — SIGNIFICANT CHANGE UP (ref 39–50)
HGB BLD-MCNC: 13.6 G/DL — SIGNIFICANT CHANGE UP (ref 13–17)
INR BLD: 1.18 RATIO — HIGH (ref 0.88–1.16)
KETONES UR-MCNC: NEGATIVE — SIGNIFICANT CHANGE UP
LACTATE SERPL-SCNC: 1.8 MMOL/L — SIGNIFICANT CHANGE UP (ref 0.7–2)
LEUKOCYTE ESTERASE UR-ACNC: NEGATIVE — SIGNIFICANT CHANGE UP
LYMPHOCYTES # BLD AUTO: 0.86 K/UL — LOW (ref 1–3.3)
LYMPHOCYTES # BLD AUTO: 5 % — LOW (ref 13–44)
MAGNESIUM SERPL-MCNC: 1.7 MG/DL — SIGNIFICANT CHANGE UP (ref 1.6–2.6)
MCHC RBC-ENTMCNC: 31.4 PG — SIGNIFICANT CHANGE UP (ref 27–34)
MCHC RBC-ENTMCNC: 33.7 GM/DL — SIGNIFICANT CHANGE UP (ref 32–36)
MCV RBC AUTO: 93.1 FL — SIGNIFICANT CHANGE UP (ref 80–100)
MONOCYTES # BLD AUTO: 1.03 K/UL — HIGH (ref 0–0.9)
MONOCYTES NFR BLD AUTO: 6 % — SIGNIFICANT CHANGE UP (ref 2–14)
NEUTROPHILS # BLD AUTO: 15.25 K/UL — HIGH (ref 1.8–7.4)
NEUTROPHILS NFR BLD AUTO: 85 % — HIGH (ref 43–77)
NITRITE UR-MCNC: NEGATIVE — SIGNIFICANT CHANGE UP
NRBC # BLD: SIGNIFICANT CHANGE UP /100 WBCS (ref 0–0)
NT-PROBNP SERPL-SCNC: 4176 PG/ML — HIGH (ref 0–125)
PH UR: 5 — SIGNIFICANT CHANGE UP (ref 5–8)
PLATELET # BLD AUTO: 114 K/UL — LOW (ref 150–400)
POTASSIUM SERPL-MCNC: 3.7 MMOL/L — SIGNIFICANT CHANGE UP (ref 3.5–5.3)
POTASSIUM SERPL-SCNC: 3.7 MMOL/L — SIGNIFICANT CHANGE UP (ref 3.5–5.3)
PROT SERPL-MCNC: 7.5 GM/DL — SIGNIFICANT CHANGE UP (ref 6–8.3)
PROT UR-MCNC: 30 MG/DL
PROTHROM AB SERPL-ACNC: 13.6 SEC — SIGNIFICANT CHANGE UP (ref 10.6–13.6)
RBC # BLD: 4.33 M/UL — SIGNIFICANT CHANGE UP (ref 4.2–5.8)
RBC # FLD: 13.6 % — SIGNIFICANT CHANGE UP (ref 10.3–14.5)
SARS-COV-2 RNA SPEC QL NAA+PROBE: SIGNIFICANT CHANGE UP
SODIUM SERPL-SCNC: 132 MMOL/L — LOW (ref 135–145)
SP GR SPEC: 1.01 — SIGNIFICANT CHANGE UP (ref 1.01–1.02)
TROPONIN I, HIGH SENSITIVITY RESULT: 14.31 NG/L — SIGNIFICANT CHANGE UP
TROPONIN I, HIGH SENSITIVITY RESULT: 19.27 NG/L — SIGNIFICANT CHANGE UP
TSH SERPL-MCNC: 0.59 UU/ML — SIGNIFICANT CHANGE UP (ref 0.34–4.82)
UROBILINOGEN FLD QL: NEGATIVE MG/DL — SIGNIFICANT CHANGE UP
WBC # BLD: 17.13 K/UL — HIGH (ref 3.8–10.5)
WBC # FLD AUTO: 17.13 K/UL — HIGH (ref 3.8–10.5)

## 2021-11-09 PROCEDURE — 94640 AIRWAY INHALATION TREATMENT: CPT

## 2021-11-09 PROCEDURE — G1004: CPT

## 2021-11-09 PROCEDURE — U0003: CPT

## 2021-11-09 PROCEDURE — 93010 ELECTROCARDIOGRAM REPORT: CPT

## 2021-11-09 PROCEDURE — 85027 COMPLETE CBC AUTOMATED: CPT

## 2021-11-09 PROCEDURE — 81001 URINALYSIS AUTO W/SCOPE: CPT

## 2021-11-09 PROCEDURE — U0005: CPT

## 2021-11-09 PROCEDURE — 71045 X-RAY EXAM CHEST 1 VIEW: CPT | Mod: 26

## 2021-11-09 PROCEDURE — 84484 ASSAY OF TROPONIN QUANT: CPT

## 2021-11-09 PROCEDURE — 83880 ASSAY OF NATRIURETIC PEPTIDE: CPT

## 2021-11-09 PROCEDURE — 83735 ASSAY OF MAGNESIUM: CPT

## 2021-11-09 PROCEDURE — 70450 CT HEAD/BRAIN W/O DYE: CPT | Mod: 26,MG

## 2021-11-09 PROCEDURE — 84100 ASSAY OF PHOSPHORUS: CPT

## 2021-11-09 PROCEDURE — 80048 BASIC METABOLIC PNL TOTAL CA: CPT

## 2021-11-09 PROCEDURE — 83605 ASSAY OF LACTIC ACID: CPT

## 2021-11-09 PROCEDURE — 87086 URINE CULTURE/COLONY COUNT: CPT

## 2021-11-09 PROCEDURE — 99223 1ST HOSP IP/OBS HIGH 75: CPT

## 2021-11-09 PROCEDURE — 99291 CRITICAL CARE FIRST HOUR: CPT

## 2021-11-09 PROCEDURE — 36415 COLL VENOUS BLD VENIPUNCTURE: CPT

## 2021-11-09 PROCEDURE — 86769 SARS-COV-2 COVID-19 ANTIBODY: CPT

## 2021-11-09 PROCEDURE — 87070 CULTURE OTHR SPECIMN AEROBIC: CPT

## 2021-11-09 PROCEDURE — 87040 BLOOD CULTURE FOR BACTERIA: CPT

## 2021-11-09 RX ORDER — SACCHAROMYCES BOULARDII 250 MG
250 POWDER IN PACKET (EA) ORAL
Refills: 0 | Status: DISCONTINUED | OUTPATIENT
Start: 2021-11-09 | End: 2021-11-15

## 2021-11-09 RX ORDER — AZITHROMYCIN 500 MG/1
500 TABLET, FILM COATED ORAL DAILY
Refills: 0 | Status: DISCONTINUED | OUTPATIENT
Start: 2021-11-10 | End: 2021-11-12

## 2021-11-09 RX ORDER — DILTIAZEM HCL 120 MG
10 CAPSULE, EXT RELEASE 24 HR ORAL ONCE
Refills: 0 | Status: COMPLETED | OUTPATIENT
Start: 2021-11-09 | End: 2021-11-09

## 2021-11-09 RX ORDER — FUROSEMIDE 40 MG
80 TABLET ORAL
Refills: 0 | Status: DISCONTINUED | OUTPATIENT
Start: 2021-11-09 | End: 2021-11-09

## 2021-11-09 RX ORDER — CEFTRIAXONE 500 MG/1
1000 INJECTION, POWDER, FOR SOLUTION INTRAMUSCULAR; INTRAVENOUS ONCE
Refills: 0 | Status: COMPLETED | OUTPATIENT
Start: 2021-11-09 | End: 2021-11-09

## 2021-11-09 RX ORDER — QUETIAPINE FUMARATE 200 MG/1
100 TABLET, FILM COATED ORAL AT BEDTIME
Refills: 0 | Status: DISCONTINUED | OUTPATIENT
Start: 2021-11-09 | End: 2021-11-15

## 2021-11-09 RX ORDER — BUDESONIDE AND FORMOTEROL FUMARATE DIHYDRATE 160; 4.5 UG/1; UG/1
2 AEROSOL RESPIRATORY (INHALATION)
Refills: 0 | Status: DISCONTINUED | OUTPATIENT
Start: 2021-11-09 | End: 2021-11-15

## 2021-11-09 RX ORDER — METOPROLOL TARTRATE 50 MG
25 TABLET ORAL
Refills: 0 | Status: DISCONTINUED | OUTPATIENT
Start: 2021-11-09 | End: 2021-11-15

## 2021-11-09 RX ORDER — GABAPENTIN 400 MG/1
400 CAPSULE ORAL THREE TIMES A DAY
Refills: 0 | Status: DISCONTINUED | OUTPATIENT
Start: 2021-11-09 | End: 2021-11-15

## 2021-11-09 RX ORDER — POLYETHYLENE GLYCOL 3350 17 G/17G
0 POWDER, FOR SOLUTION ORAL
Qty: 0 | Refills: 0 | DISCHARGE

## 2021-11-09 RX ORDER — SODIUM CHLORIDE 9 MG/ML
1000 INJECTION INTRAMUSCULAR; INTRAVENOUS; SUBCUTANEOUS
Refills: 0 | Status: DISCONTINUED | OUTPATIENT
Start: 2021-11-09 | End: 2021-11-12

## 2021-11-09 RX ORDER — FOLIC ACID 0.8 MG
1 TABLET ORAL DAILY
Refills: 0 | Status: DISCONTINUED | OUTPATIENT
Start: 2021-11-09 | End: 2021-11-15

## 2021-11-09 RX ORDER — THIAMINE MONONITRATE (VIT B1) 100 MG
100 TABLET ORAL DAILY
Refills: 0 | Status: DISCONTINUED | OUTPATIENT
Start: 2021-11-09 | End: 2021-11-15

## 2021-11-09 RX ORDER — DOXAZOSIN MESYLATE 4 MG
2 TABLET ORAL AT BEDTIME
Refills: 0 | Status: DISCONTINUED | OUTPATIENT
Start: 2021-11-09 | End: 2021-11-15

## 2021-11-09 RX ORDER — SODIUM CHLORIDE 9 MG/ML
1000 INJECTION INTRAMUSCULAR; INTRAVENOUS; SUBCUTANEOUS ONCE
Refills: 0 | Status: COMPLETED | OUTPATIENT
Start: 2021-11-09 | End: 2021-11-09

## 2021-11-09 RX ORDER — ENOXAPARIN SODIUM 100 MG/ML
40 INJECTION SUBCUTANEOUS DAILY
Refills: 0 | Status: DISCONTINUED | OUTPATIENT
Start: 2021-11-09 | End: 2021-11-15

## 2021-11-09 RX ORDER — DILTIAZEM HCL 120 MG
120 CAPSULE, EXT RELEASE 24 HR ORAL DAILY
Refills: 0 | Status: DISCONTINUED | OUTPATIENT
Start: 2021-11-09 | End: 2021-11-15

## 2021-11-09 RX ORDER — POLYETHYLENE GLYCOL 3350 17 G/17G
17 POWDER, FOR SOLUTION ORAL DAILY
Refills: 0 | Status: DISCONTINUED | OUTPATIENT
Start: 2021-11-09 | End: 2021-11-15

## 2021-11-09 RX ORDER — CEFTRIAXONE 500 MG/1
1000 INJECTION, POWDER, FOR SOLUTION INTRAMUSCULAR; INTRAVENOUS EVERY 24 HOURS
Refills: 0 | Status: DISCONTINUED | OUTPATIENT
Start: 2021-11-10 | End: 2021-11-10

## 2021-11-09 RX ORDER — DILTIAZEM HCL 120 MG
5 CAPSULE, EXT RELEASE 24 HR ORAL
Qty: 125 | Refills: 0 | Status: DISCONTINUED | OUTPATIENT
Start: 2021-11-09 | End: 2021-11-10

## 2021-11-09 RX ORDER — PANTOPRAZOLE SODIUM 20 MG/1
40 TABLET, DELAYED RELEASE ORAL
Refills: 0 | Status: DISCONTINUED | OUTPATIENT
Start: 2021-11-09 | End: 2021-11-15

## 2021-11-09 RX ORDER — AZITHROMYCIN 500 MG/1
500 TABLET, FILM COATED ORAL ONCE
Refills: 0 | Status: COMPLETED | OUTPATIENT
Start: 2021-11-09 | End: 2021-11-09

## 2021-11-09 RX ORDER — ASPIRIN/CALCIUM CARB/MAGNESIUM 324 MG
81 TABLET ORAL DAILY
Refills: 0 | Status: DISCONTINUED | OUTPATIENT
Start: 2021-11-09 | End: 2021-11-15

## 2021-11-09 RX ORDER — ALBUTEROL 90 UG/1
2 AEROSOL, METERED ORAL EVERY 6 HOURS
Refills: 0 | Status: DISCONTINUED | OUTPATIENT
Start: 2021-11-09 | End: 2021-11-15

## 2021-11-09 RX ORDER — TIOTROPIUM BROMIDE 18 UG/1
1 CAPSULE ORAL; RESPIRATORY (INHALATION) DAILY
Refills: 0 | Status: DISCONTINUED | OUTPATIENT
Start: 2021-11-09 | End: 2021-11-15

## 2021-11-09 RX ORDER — SENNA PLUS 8.6 MG/1
2 TABLET ORAL AT BEDTIME
Refills: 0 | Status: DISCONTINUED | OUTPATIENT
Start: 2021-11-09 | End: 2021-11-15

## 2021-11-09 RX ADMIN — Medication 10 MILLIGRAM(S): at 09:49

## 2021-11-09 RX ADMIN — SODIUM CHLORIDE 50 MILLILITER(S): 9 INJECTION INTRAMUSCULAR; INTRAVENOUS; SUBCUTANEOUS at 23:08

## 2021-11-09 RX ADMIN — SODIUM CHLORIDE 1000 MILLILITER(S): 9 INJECTION INTRAMUSCULAR; INTRAVENOUS; SUBCUTANEOUS at 11:30

## 2021-11-09 RX ADMIN — Medication 2 MILLIGRAM(S): at 23:23

## 2021-11-09 RX ADMIN — CEFTRIAXONE 1000 MILLIGRAM(S): 500 INJECTION, POWDER, FOR SOLUTION INTRAMUSCULAR; INTRAVENOUS at 13:44

## 2021-11-09 RX ADMIN — AZITHROMYCIN 255 MILLIGRAM(S): 500 TABLET, FILM COATED ORAL at 13:44

## 2021-11-09 RX ADMIN — SENNA PLUS 2 TABLET(S): 8.6 TABLET ORAL at 23:07

## 2021-11-09 RX ADMIN — QUETIAPINE FUMARATE 100 MILLIGRAM(S): 200 TABLET, FILM COATED ORAL at 23:07

## 2021-11-09 RX ADMIN — GABAPENTIN 400 MILLIGRAM(S): 400 CAPSULE ORAL at 23:07

## 2021-11-09 RX ADMIN — Medication 10 MILLIGRAM(S): at 23:08

## 2021-11-09 RX ADMIN — Medication 120 MILLIGRAM(S): at 16:47

## 2021-11-09 RX ADMIN — Medication 25 MILLIGRAM(S): at 16:47

## 2021-11-09 RX ADMIN — SODIUM CHLORIDE 1000 MILLILITER(S): 9 INJECTION INTRAMUSCULAR; INTRAVENOUS; SUBCUTANEOUS at 10:24

## 2021-11-09 RX ADMIN — SODIUM CHLORIDE 50 MILLILITER(S): 9 INJECTION INTRAMUSCULAR; INTRAVENOUS; SUBCUTANEOUS at 16:49

## 2021-11-09 RX ADMIN — Medication 25 MILLIGRAM(S): at 23:07

## 2021-11-09 RX ADMIN — Medication 250 MILLIGRAM(S): at 23:23

## 2021-11-09 NOTE — H&P ADULT - NSHPREVIEWOFSYSTEMS_GEN_ALL_CORE
REVIEW OF SYSTEMS:    CONSTITUTIONAL: No weakness, fevers or chills  EYES/ENT: No visual changes; vertigo or throat pain   NECK: No pain or stiffness  RESPIRATORY: No cough, wheezing, hemoptysis + shortness of breath  CARDIOVASCULAR: No chest pain or palpitations  GASTROINTESTINAL: No abdominal or epigastric pain. No nausea, vomiting, or hematemesis; No diarrhea or constipation. No melena or hematochezia.  GENITOURINARY: No dysuria, urinary frequency or hematuria  NEUROLOGICAL: No numbness or weakness  EXTREMITIES: No swelling or tenderness  SKIN: No itching, burning, rashes, or lesions   All other review of systems is negative unless indicated above.

## 2021-11-09 NOTE — H&P ADULT - HISTORY OF PRESENT ILLNESS
5 yo Male with PMH of diastolic CHF (LVEF 55-60% 5/2020), a. fib s/p watchman device, obesity, COPD, HTN, neuropathy, s/p left BKA, h/o CVA, h/o trach s/p removal, h/o etoh abuse, h/o pneumothorax, h/o cardiac arrest presents to ED 65 yo Male with PMH of diastolic CHF (LVEF 55-60% 5/2020), a. fib s/p watchman device, obesity, COPD, HTN, neuropathy, s/p left BKA, h/o CVA, h/o trach s/p removal, h/o etoh abuse, h/o pneumothorax, h/o cardiac arrest presents to ED for a near syncopeal episode this morning where his legs gave out;  Did not lose consciousness. landed on his back onto his head, wasn't able to get up afterwards, was lifted up with assistance from other people. He states that he had a few drinks last night; Patient denies any Chest pain; does have shortness of breath with exertion that is chronic as per the patient.     EMS noted pt had elevated HR up to 170s, was given amiodarone in route.  In the ER, patient was AFIB with RVR.  PAtient was given IV cardizem 10mg IV X 2. He was also given Rocephin and zithromax     FM - Unknown as patient is adopted

## 2021-11-09 NOTE — CONSULT NOTE ADULT - PROBLEM SELECTOR RECOMMENDATION 2
Appears mildly hypervolemic.  primary team holding diuretics at this time  2/2 hypotension, cautiously watch fluid status & restart diuretics  if respiratory status declines.

## 2021-11-09 NOTE — PHARMACOTHERAPY INTERVENTION NOTE - COMMENTS
Medication history complete, patient is from [facility], reviewed and confirmed medications with list provided by facility.

## 2021-11-09 NOTE — ED ADULT NURSE REASSESSMENT NOTE - NS ED NURSE REASSESS COMMENT FT1
PT HR btw 100- 125 bpm at time of administration, /59. Dr. Darling aware. Will monitor and reassess.

## 2021-11-09 NOTE — ED PROVIDER NOTE - OBJECTIVE STATEMENT
67 y/o male with PMHx of CHF, COPD, Afib, HTN, GERD, EtOH abuse presents to ED s/p fall. Pt states he was walking this morning, felt some weakness, his legs gave out and landed on his back onto his head onto ground, wasn't able to get up afterwards, was lifted up with assistance from other people, EMS was called. Pt c/o headache currently. On metoprolol, Cardizem, baby ASA, not on blood thinners. Denies hx of cardiac stents. Reports he had a few drinks last night, drinks about once a month, Pulmonologist: Dr. Mckenna. 67 y/o male with PMHx of CHF, COPD, Afib, HTN, GERD, EtOH abuse presents to ED s/p fall. Pt states he was walking this morning, felt some weakness, his legs gave out and landed on his back onto his head, wasn't able to get up afterwards, was lifted up with assistance from other people, EMS was called. Pt c/o headache currently. On metoprolol, Cardizem, baby ASA, not on blood thinners. Denies hx of cardiac stents. Reports he had a few drinks last night, drinks about once a month. EMS noted pt had elevated HR up to 170s, was given amiodarone in route. Pulmonologist: Dr. Mckenna.

## 2021-11-09 NOTE — ED ADULT TRIAGE NOTE - CHIEF COMPLAINT QUOTE
pt presents to ED due to rapid HR pt has hx of Afib pt on metoprolol and caridizem woke up this AM HR 170s pt given Amiodarone 150mg by EMS HR now 124

## 2021-11-09 NOTE — ED PROVIDER NOTE - PROGRESS NOTE DETAILS
Phong CASTAÑEDA: patient with afib RVR; unsure who his cardiologist is- will call his daughter; patient with watchman device; endorsed to Dr. Sánchez for admission. Phong DO: patient with leukocytosis; b/l pna on cxr; blood cultures, lactic acid ordered; antibiotics ordered. Phong DO: patient with leukocytosis; b/l pna on cxr; blood cultures, lactic acid ordered; antibiotics ordered. consult placed for patient's cardiologist- Dr. Lorenzo ríos.

## 2021-11-09 NOTE — CONSULT NOTE ADULT - PROBLEM SELECTOR RECOMMENDATION 9
s/p Watchman device Apr '19 due to fall risk, CHADS2-VASc=2 (HTN, HFpEF).  Suspect elevated rates are due to underlying infection (likely bilateral penumonia)  as pt has been rate controlled on the current medication w/o problems in the past.  Cont. meds at OP dose, agree w/ low dose dilt gtt titrate for baseline HR in 100s-120s  given current active infection.

## 2021-11-09 NOTE — ED ADULT NURSE NOTE - OBJECTIVE STATEMENT
Pt is a 66yr old male, A&OX4, coming from [facility], HX of L BKA, COPD, HTN, CHF, GERD, and AFIB (not on anticoagulants), s/p mechanical fall found to have rapid HR. As per EMS, pt HR in the 170's. HR currently in the 120-130's at this time. States he fell backwards and hit the back of his head, denies visional disturbances or HA. Resp. even and unlabored, on 3L of home O2. Denies CP, SOB, palpitations, dizziness, abd. pain, N/V, fever/chills, cough, and urinary symptoms. Arrives with 20g IV placed to the L hand from EMS. 20g IV placed to the R AC. Labs sent. AFIB on the CM. VS as noted. In NAD.

## 2021-11-09 NOTE — ED PROVIDER NOTE - ABNORMAL RHYTHM
Ventricular Rate : 68  Atrial Rate : 68  P-R Interval : 152  QRS Duration : 98  Q-T Interval : 394  QTC Calculation(Bezet) : 418  P Axis : 43  R Axis : 53  T Axis : 14  Diagnosis : Normal sinus rhythm  Minimal voltage criteria for LVH, may be normal variant  Borderline ECG  When compared with ECG of 19-FEB-2019 13:36,  T wave inversion now evident in Inferior leads  Inverted T waves have replaced nonspecific T wave abnormality in Lateral leads  Confirmed by Ifeanyi Lambert ([id number]) on 5/14/2019 12:43:53 PM  
atrial fibrillation

## 2021-11-09 NOTE — H&P ADULT - ASSESSMENT
66 yo male with PMH of diastolic CHF (LVEF 55-60% 5/2020), a. fib s/p watchman device, obesity, COPD, HTN, neuropathy, s/p left BKA, h/o CVA, h/o trach s/p removal, h/o etoh abuse, h/o pneumothorax, h/o cardiac arrest presents with:      #Acute on Chronic a. fib s/p watchmans device  - continue asa and plavix  - continue cardizem and toprol    #Chronic Diastolic CHF    #COPD - stable  - continue symbicort, spiriva, albuterol    #HTN   - continue cardizem and toprol    #DVT prophylaxis  - lovenox   64 yo male with PMH of diastolic CHF (LVEF 55-60% 5/2020), a. fib s/p watchman device, obesity, COPD, HTN, neuropathy, s/p left BKA, h/o CVA, h/o trach s/p removal, h/o etoh abuse, h/o pneumothorax, h/o cardiac arrest presents with Near Syncope.    #Near Syncope  #Sepsis 2ndry to GNR Pneumonia  #Leukocytosis and tachycardia   -Admit to tele  -CT head NAD  -Lactate WNL  -IVF  -Hold aldactone and Lasix  -Continue Rocephin and Zithromax  -ID consult     #AFIB with RVR   - Hx  watchmans device  - continue asa   - continue cardizem and toprol  - Cardizem Drip if HR >130  - cardio consult  - Discussed with Dr Mc    #ANUSHKA  -Hold lasix and Aldactone   -monitor BMP    #Chronic Diastolic CHF  -Hold Lasix and aldactone   -monitor I&O, daily weights  -Monitor with IVF     #Hx of Alcohol Use  -monitor for withdrawal    #COPD - stable  - continue symbicort, spiriva, albuterol    #HTN   - continue cardizem and toprol    #DVT prophylaxis  - lovenox

## 2021-11-09 NOTE — H&P ADULT - NSHPPHYSICALEXAM_GEN_ALL_CORE
PHYSICAL EXAM:  Constitutional: NAD, awake and alert  Neurological: AAO x 3, no focal deficits  HEENT: PERRLA, EOMI, MMM  Neck: Soft and supple, No LAD, No JVD  Respiratory: Breath sounds are clear bilaterally, No wheezing, rales or rhonchi  Cardiovascular: S1 and S2, regular rate and rhythm; no Murmurs, gallops or rubs  Gastrointestinal: Bowel Sounds present, soft, nontender, nondistended, no guarding, no rebound tenderness  Back: No CVA tenderness   Extremities: No peripheral edema; +left bka  Vascular: 2+ peripheral pulses  Musculoskeletal: 5/5 strength b/l upper and lower extremities  Skin: No rashes  Breast: Deferred  Rectal: Deferred

## 2021-11-09 NOTE — CONSULT NOTE ADULT - PROBLEM SELECTOR RECOMMENDATION 3
Currently normotensive.  Reviewed antihypertensives dilt & metoprolol are for rate control as well  cont. moustapha at current dose.    D/w Dr. Porter

## 2021-11-09 NOTE — H&P ADULT - NSHPLABSRESULTS_GEN_ALL_CORE
Lab Results:  CBC  CBC Full  -  ( 09 Nov 2021 09:24 )  WBC Count : 17.13 K/uL  RBC Count : 4.33 M/uL  Hemoglobin : 13.6 g/dL  Hematocrit : 40.3 %  Platelet Count - Automated : 114 K/uL  Mean Cell Volume : 93.1 fl  Mean Cell Hemoglobin : 31.4 pg  Mean Cell Hemoglobin Concentration : 33.7 gm/dL  Auto Neutrophil # : 15.25 K/uL  Auto Lymphocyte # : 0.86 K/uL  Auto Monocyte # : 1.03 K/uL  Auto Eosinophil # : 0.00 K/uL  Auto Basophil # : 0.00 K/uL  Auto Neutrophil % : 85.0 %  Auto Lymphocyte % : 5.0 %  Auto Monocyte % : 6.0 %  Auto Eosinophil % : 0.0 %  Auto Basophil % : 0.0 %    .		Differential:	[] Automated		[] Manual  Chemistry                        13.6   17.13 )-----------( 114      ( 09 Nov 2021 09:24 )             40.3     11-09    132<L>  |  96  |  27<H>  ----------------------------<  146<H>  3.7   |  28  |  1.85<H>    Ca    8.5      09 Nov 2021 09:24  Mg     1.7     11-09    TPro  7.5  /  Alb  3.2<L>  /  TBili  1.1  /  DBili  x   /  AST  20  /  ALT  18  /  AlkPhos  106  11-09    LIVER FUNCTIONS - ( 09 Nov 2021 09:24 )  Alb: 3.2 g/dL / Pro: 7.5 gm/dL / ALK PHOS: 106 U/L / ALT: 18 U/L / AST: 20 U/L / GGT: x           PT/INR - ( 09 Nov 2021 09:24 )   PT: 13.6 sec;   INR: 1.18 ratio         PTT - ( 09 Nov 2021 09:24 )  PTT:31.5 sec        RADIOLOGY RESULTS:    EKG AFIB with RVR @136 bpm    < from: Xray Chest 1 View- PORTABLE-Urgent (Xray Chest 1 View- PORTABLE-Urgent .) (11.09.21 @ 09:56) >      IMPRESSION: Bilateral lung parenchymal opacities are noted, right greater than left, with appearance most suggestive for bilateral pneumonia.    < end of copied text >    < from: CT Head No Cont (11.09.21 @ 10:22) >      IMPRESSION:    1)  chronic partial empty sella. Otherwise unremarkable CT study of the brain. No acute abnormality suggested..  2)  no intracerebral hemorrhage, contusion, or extracerebral collections are identified.      < end of copied text >

## 2021-11-09 NOTE — H&P ADULT - NSHPSOCIALHISTORY_GEN_ALL_CORE
No tobacco or illicit drug use; Hx of alcohol abuse, had a few drinks last night; Not a very reliable historian

## 2021-11-09 NOTE — CONSULT NOTE ADULT - SUBJECTIVE AND OBJECTIVE BOX
HPI:    67 y/o w/    *HFpEF  *afib-persistent/permanent  *s/p CVA-per MRI study '17-"mild chronic microvascular changes"  *s/p watchman Apr '19-due to fall risk  *HTN    *Obesity-BMI=40  *VIJAY noncompliant w/ CPAP  *ETOHism  *recurrent falls s/p BKA  *COPD    presents to ED for a near syncopeal episode this morning where his legs gave out;  Did not lose consciousness.   landed on his back onto his head, wasn't able to get up afterwards,   was lifted up with assistance from other people.   He states that he had a few drinks last night; Patient denies any Chest pain; does have shortness of breath with exertion that is chronic as per the patient.     EMS noted pt had elevated HR up to 170s, was given amiodarone in route.    In the ER, patient was AFIB with RVR.  PAtient was given IV cardizem 10mg IV X 2. He was also given Rocephin and zithromax     Evaluated pt in ED. Currently comfortable w/ HRs 100s-120s  & SBPs 100s.  Denies chest discomfort, palpitations, dyspnea, or weight gain.  Reports compliance w/ meds.  Admits to drinking 6-8 shots of tequila    PAST MEDICAL AND SURGICAL HISTORY:  Chronic atrial fibrillation  Alcohol abuse    Poor historian    CHF (congestive heart failure)    Cirrhosis    Emphysema of lung    Alcohol withdrawal    Collapsed lung    Meningitis    Falls    Anemia    Chronic obstructive pulmonary disease (COPD)    GERD (gastroesophageal reflux disease)    Hypertension    Presence of Watchman left atrial appendage closure device    Atrial fibrillation    COPD without exacerbation    Chronic CHF    S/P BKA (below knee amputation) unilateral, left    Presence of Watchman left atrial appendage closure device    Unilateral amputation of lower extremity below knee    H/O tracheostomy  now removed    S/P percutaneous endoscopic gastrostomy (PEG) tube placement  now removed        ALLERGIES:  Allergies    Ceclor (Rash)  Duricef (Rash)    Intolerances        SOCIAL HISTORY:  No tobacco or illicit drug use; Hx of alcohol abuse, had a few drinks last night; Not a very reliable historian (09 Nov 2021 14:50)      FAMILY  HISTORY:  adopted      MEDICATIONS:  OUTPATIENT:  Home Medications:  acetaminophen 325 mg oral tablet: 2 tab(s) orally every 4 hours, As Needed for general discomfort (09 Nov 2021 11:55)  albuterol 2.5 mg/3 mL (0.083%) inhalation solution: 3 milliliter(s) inhaled every 4 hours, As Needed for wheezing (09 Nov 2021 11:55)  albuterol 90 mcg/inh inhalation aerosol: 2 puff(s) inhaled every 4 hours, As Needed for wheezing (09 Nov 2021 11:55)  Aspirin Enteric Coated 81 mg oral delayed release tablet: 1 tab(s) orally once a day (09 Nov 2021 12:30)  budesonide-formoterol 160 mcg-4.5 mcg/inh inhalation aerosol: 2 puff(s) inhaled 2 times a day (09 Nov 2021 11:55)  busPIRone 10 mg oral tablet: 1 tab(s) orally 2 times a day (09 Nov 2021 11:55)  dilTIAZem 120 mg/24 hours oral capsule, extended release: 1 cap(s) orally once a day (09 Nov 2021 11:55)  doxazosin 2 mg oral tablet: 1 tab(s) orally once a day (at bedtime) (09 Nov 2021 11:55)  Florastor 250 mg oral capsule: 1 cap(s) orally 2 times a day (09 Nov 2021 12:30)  gabapentin 400 mg oral capsule: 1 cap(s) orally 3 times a day (09 Nov 2021 11:55)  GlycoLax oral powder for reconstitution: 17 gram(s) orally once a day (09 Nov 2021 12:30)  Lasix 80 mg oral tablet: 1 tab(s) orally 2 times a day (09 Nov 2021 11:55)  Metoprolol Tartrate 25 mg oral tablet: 1 tab(s) orally 2 times a day (09 Nov 2021 11:55)  omeprazole 40 mg oral delayed release capsule: 1 cap(s) orally once a day (09 Nov 2021 11:55)  QUEtiapine 50 mg oral tablet: 2 tab(s) orally once a day (at bedtime) (09 Nov 2021 12:30)  senna oral tablet: 2 tab(s) orally once a day (at bedtime) (09 Nov 2021 11:55)  Spiriva 18 mcg inhalation capsule: 1 cap(s) inhaled once a day (09 Nov 2021 11:55)  spironolactone 25 mg oral tablet: 1 tab(s) orally once a day (09 Nov 2021 12:30)  thiamine 100 mg oral tablet: 1 tab(s) orally once a day (09 Nov 2021 11:55)        INPATIENT:  MEDICATIONS  (STANDING):  aspirin enteric coated 81 milliGRAM(s) Oral daily  budesonide 160 MICROgram(s)/formoterol 4.5 MICROgram(s) Inhaler 2 Puff(s) Inhalation two times a day  busPIRone 10 milliGRAM(s) Oral two times a day  diltiazem    milliGRAM(s) Oral daily  diltiazem Infusion 5 mG/Hr (5 mL/Hr) IV Continuous <Continuous>  doxazosin 2 milliGRAM(s) Oral at bedtime  enoxaparin Injectable 40 milliGRAM(s) SubCutaneous daily  folic acid 1 milliGRAM(s) Oral daily  gabapentin 400 milliGRAM(s) Oral three times a day  metoprolol tartrate 25 milliGRAM(s) Oral two times a day  multivitamin 1 Tablet(s) Oral daily  pantoprazole    Tablet 40 milliGRAM(s) Oral before breakfast  polyethylene glycol 3350 17 Gram(s) Oral daily  QUEtiapine 100 milliGRAM(s) Oral at bedtime  saccharomyces boulardii 250 milliGRAM(s) Oral two times a day  senna 2 Tablet(s) Oral at bedtime  sodium chloride 0.9%. 1000 milliLiter(s) (50 mL/Hr) IV Continuous <Continuous>  thiamine 100 milliGRAM(s) Oral daily  tiotropium 18 MICROgram(s) Capsule 1 Capsule(s) Inhalation daily    MEDICATIONS  (PRN):  ALBUTerol    90 MICROgram(s) HFA Inhaler 2 Puff(s) Inhalation every 6 hours PRN Shortness of Breath and/or Wheezing  LORazepam     Tablet 1 milliGRAM(s) Oral every 1 hour PRN CIWA-Ar score 8 or greater    MEDICATIONS  (PRN):  ALBUTerol    90 MICROgram(s) HFA Inhaler 2 Puff(s) Inhalation every 6 hours PRN Shortness of Breath and/or Wheezing  LORazepam     Tablet 1 milliGRAM(s) Oral every 1 hour PRN CIWA-Ar score 8 or greater    REVIEW OF SYSTEMS:    CONSTITUTIONAL: No weakness, fevers or chills  EYES/ENT: No visual changes;  No vertigo or throat pain   NECK: No pain or stiffness  RESPIRATORY: No cough, wheezing, hemoptysis; No shortness of breath  CARDIOVASCULAR: No chest pain or palpitations  GASTROINTESTINAL: No abdominal or epigastric pain. No nausea, vomiting, or hematemesis; No diarrhea or constipation. No melena or hematochezia.  GENITOURINARY: No dysuria, frequency or hematuria  NEUROLOGICAL: No numbness or weakness  SKIN: No itching, burning, rashes, or lesions   All other review of systems is negative unless indicated above    Vital Signs Last 24 Hrs  T(C): 36.8 (09 Nov 2021 13:53), Max: 37 (09 Nov 2021 09:33)  T(F): 98.2 (09 Nov 2021 13:53), Max: 98.6 (09 Nov 2021 09:33)  HR: 115 (09 Nov 2021 14:41) (106 - 130)  BP: 100/59 (09 Nov 2021 14:41) (94/52 - 143/57)  BP(mean): 68 (09 Nov 2021 14:41) (68 - 111)  RR: 24 (09 Nov 2021 14:41) (20 - 24)  SpO2: 95% (09 Nov 2021 14:41) (92% - 97%)      PHYSICAL EXAM:    Constitutional: NAD, awake and alert, well-developed  HEENT: PERR, EOMI,  No oral cyananosis.  Neck:  supple,  No JVD  Respiratory: bibasilar crackles posteriorly, No wheezing, rales or rhonchi  Cardiovascular: S1 and S2, irregular rate & rhythm, no Murmurs, gallops or rubs  Gastrointestinal: Bowel Sounds present, soft, nontender.   Extremities: R leg LE edema, left leg amputated above knee. No clubbing or cyanosis.  Vascular: 2+ peripheral pulses  Neurological: A/O x 3, no focal deficits  Musculoskeletal: no calf tenderness.  Skin: No rashes.      LABS: All Labs Reviewed:                        13.6   17.13 )-----------( 114      ( 09 Nov 2021 09:24 )             40.3     09 Nov 2021 09:24    132    |  96     |  27     ----------------------------<  146    3.7     |  28     |  1.85   < baseline 0.8    Ca    8.5        09 Nov 2021 09:24  Mg     1.7       09 Nov 2021 09:24    TPro  7.5    /  Alb  3.2    /  TBili  1.1    /  DBili  x      /  AST  20     /  ALT  18     /  AlkPhos  106    09 Nov 2021 09:24    PT/INR - ( 09 Nov 2021 09:24 )   PT: 13.6 sec;   INR: 1.18 ratio         PTT - ( 09 Nov 2021 09:24 )  PTT:31.5 sec      Blood Culture:   11-09 @ 09:24  Pro Bnp [id number]    11-09 @ 09:24  TSH: 0.59    ===============================  EKG: afib 130s    ===============================  RADIOLOGY:  < from: Xray Chest 1 View- PORTABLE-Urgent (Xray Chest 1 View- PORTABLE-Urgent .) (11.09.21 @ 09:56) >    PROCEDURE DATE:  11/09/2021          INTERPRETATION:  INDICATION: Shortness of breath    PRIORS: 6/22/2021.    VIEWS: Portable AP radiography of the chest performed.    FINDINGS: The cardiac silhouette appears enlarged, likely related to cardiomegaly. No superior mediastinal widening. Bilateral lung parenchymal opacities are noted, right greater than left, with appearance most suggestive for bilateral pneumonia. No definite pleural effusion. No pneumothorax. No mediastinal shift. No acute osseous fractures.    IMPRESSION: Bilateral lung parenchymal opacities are noted, right greater than left, with appearance most suggestive for bilateral pneumonia.    --- End of Report ---            ISMAEL CHURCHILL MD; Attending Radiologist  This document has been electronically signed. Nov 9 2021 11:47AM    < end of copied text >  ===============================  *Nuclear-lexiscan Oct '21: normal perfusion, normal EF.    ===============================  Echo June '21     Impression     Summary     Mild concentric left ventricular hypertrophy is present.   Estimated left ventricular ejection fraction is 60-65 %.   The left atrium is severely dilated.   The right atrium is not well seen.   The right ventricle is not well seen, appears grossly normal.   The aortic valve is trileafletwith thin pliable leaflets.   Fibrocalcific changes noted to the mitral valve leaflets with preserved   leaflet excursion.   Highly eccentric mitral regurgitation is noted.(? severity-study limited))   The tricuspid valve leaflets are thin and pliable; valve motion is normal.   Moderate (2+) tricuspid valve regurgitation is present.   Moderate pulmonary hypertension.   No evidence of pericardial effusion.     Signature     ----------------------------------------------------------------   Electronically signed by Chapito Sim MD(Interpreting   physician) on 06/30/2021 05:43 PM      ===============================    Dominick Middleton M.D.  Cardiology, [facility]  Cell: [phone]  Office:   [phone] ([facility] Office)  [phone] ([facility] Office)

## 2021-11-10 LAB
ANION GAP SERPL CALC-SCNC: 5 MMOL/L — SIGNIFICANT CHANGE UP (ref 5–17)
BUN SERPL-MCNC: 24 MG/DL — HIGH (ref 7–23)
CALCIUM SERPL-MCNC: 8.5 MG/DL — SIGNIFICANT CHANGE UP (ref 8.5–10.1)
CHLORIDE SERPL-SCNC: 101 MMOL/L — SIGNIFICANT CHANGE UP (ref 96–108)
CO2 SERPL-SCNC: 30 MMOL/L — SIGNIFICANT CHANGE UP (ref 22–31)
COVID-19 NUCLEOCAPSID GAM AB INTERP: NEGATIVE — SIGNIFICANT CHANGE UP
COVID-19 NUCLEOCAPSID TOTAL GAM ANTIBODY RESULT: 0.08 INDEX — SIGNIFICANT CHANGE UP
COVID-19 SPIKE DOMAIN AB INTERP: POSITIVE
COVID-19 SPIKE DOMAIN ANTIBODY RESULT: >250 U/ML — HIGH
CREAT SERPL-MCNC: 0.93 MG/DL — SIGNIFICANT CHANGE UP (ref 0.5–1.3)
CULTURE RESULTS: SIGNIFICANT CHANGE UP
GLUCOSE SERPL-MCNC: 105 MG/DL — HIGH (ref 70–99)
HCT VFR BLD CALC: 33.9 % — LOW (ref 39–50)
HGB BLD-MCNC: 11.5 G/DL — LOW (ref 13–17)
MAGNESIUM SERPL-MCNC: 2 MG/DL — SIGNIFICANT CHANGE UP (ref 1.6–2.6)
MCHC RBC-ENTMCNC: 31.8 PG — SIGNIFICANT CHANGE UP (ref 27–34)
MCHC RBC-ENTMCNC: 33.9 GM/DL — SIGNIFICANT CHANGE UP (ref 32–36)
MCV RBC AUTO: 93.6 FL — SIGNIFICANT CHANGE UP (ref 80–100)
PHOSPHATE SERPL-MCNC: 2.5 MG/DL — SIGNIFICANT CHANGE UP (ref 2.5–4.5)
PLATELET # BLD AUTO: 107 K/UL — LOW (ref 150–400)
POTASSIUM SERPL-MCNC: 3.4 MMOL/L — LOW (ref 3.5–5.3)
POTASSIUM SERPL-SCNC: 3.4 MMOL/L — LOW (ref 3.5–5.3)
RBC # BLD: 3.62 M/UL — LOW (ref 4.2–5.8)
RBC # FLD: 13.8 % — SIGNIFICANT CHANGE UP (ref 10.3–14.5)
SARS-COV-2 IGG+IGM SERPL QL IA: 0.08 INDEX — SIGNIFICANT CHANGE UP
SARS-COV-2 IGG+IGM SERPL QL IA: >250 U/ML — HIGH
SARS-COV-2 IGG+IGM SERPL QL IA: NEGATIVE — SIGNIFICANT CHANGE UP
SARS-COV-2 IGG+IGM SERPL QL IA: POSITIVE
SODIUM SERPL-SCNC: 136 MMOL/L — SIGNIFICANT CHANGE UP (ref 135–145)
SPECIMEN SOURCE: SIGNIFICANT CHANGE UP
WBC # BLD: 11.38 K/UL — HIGH (ref 3.8–10.5)
WBC # FLD AUTO: 11.38 K/UL — HIGH (ref 3.8–10.5)

## 2021-11-10 PROCEDURE — 99233 SBSQ HOSP IP/OBS HIGH 50: CPT

## 2021-11-10 RX ORDER — ACETAMINOPHEN 500 MG
650 TABLET ORAL EVERY 6 HOURS
Refills: 0 | Status: DISCONTINUED | OUTPATIENT
Start: 2021-11-10 | End: 2021-11-15

## 2021-11-10 RX ORDER — CEFEPIME 1 G/1
INJECTION, POWDER, FOR SOLUTION INTRAMUSCULAR; INTRAVENOUS
Refills: 0 | Status: DISCONTINUED | OUTPATIENT
Start: 2021-11-10 | End: 2021-11-15

## 2021-11-10 RX ORDER — CEFEPIME 1 G/1
2000 INJECTION, POWDER, FOR SOLUTION INTRAMUSCULAR; INTRAVENOUS EVERY 12 HOURS
Refills: 0 | Status: DISCONTINUED | OUTPATIENT
Start: 2021-11-10 | End: 2021-11-15

## 2021-11-10 RX ORDER — ACETAMINOPHEN 500 MG
650 TABLET ORAL ONCE
Refills: 0 | Status: COMPLETED | OUTPATIENT
Start: 2021-11-10 | End: 2021-11-10

## 2021-11-10 RX ORDER — INFLUENZA VIRUS VACCINE 15; 15; 15; 15 UG/.5ML; UG/.5ML; UG/.5ML; UG/.5ML
0.5 SUSPENSION INTRAMUSCULAR ONCE
Refills: 0 | Status: COMPLETED | OUTPATIENT
Start: 2021-11-10 | End: 2021-11-10

## 2021-11-10 RX ORDER — CEFEPIME 1 G/1
2000 INJECTION, POWDER, FOR SOLUTION INTRAMUSCULAR; INTRAVENOUS ONCE
Refills: 0 | Status: COMPLETED | OUTPATIENT
Start: 2021-11-10 | End: 2021-11-10

## 2021-11-10 RX ADMIN — Medication 10 MILLIGRAM(S): at 10:10

## 2021-11-10 RX ADMIN — Medication 100 MILLIGRAM(S): at 10:11

## 2021-11-10 RX ADMIN — Medication 120 MILLIGRAM(S): at 10:10

## 2021-11-10 RX ADMIN — BUDESONIDE AND FORMOTEROL FUMARATE DIHYDRATE 2 PUFF(S): 160; 4.5 AEROSOL RESPIRATORY (INHALATION) at 11:58

## 2021-11-10 RX ADMIN — Medication 25 MILLIGRAM(S): at 23:38

## 2021-11-10 RX ADMIN — GABAPENTIN 400 MILLIGRAM(S): 400 CAPSULE ORAL at 12:56

## 2021-11-10 RX ADMIN — Medication 81 MILLIGRAM(S): at 10:09

## 2021-11-10 RX ADMIN — CEFEPIME 100 MILLIGRAM(S): 1 INJECTION, POWDER, FOR SOLUTION INTRAMUSCULAR; INTRAVENOUS at 23:47

## 2021-11-10 RX ADMIN — Medication 650 MILLIGRAM(S): at 01:45

## 2021-11-10 RX ADMIN — Medication 1 MILLIGRAM(S): at 10:11

## 2021-11-10 RX ADMIN — GABAPENTIN 400 MILLIGRAM(S): 400 CAPSULE ORAL at 23:38

## 2021-11-10 RX ADMIN — Medication 25 MILLIGRAM(S): at 10:11

## 2021-11-10 RX ADMIN — Medication 40 MILLIGRAM(S): at 23:39

## 2021-11-10 RX ADMIN — BUDESONIDE AND FORMOTEROL FUMARATE DIHYDRATE 2 PUFF(S): 160; 4.5 AEROSOL RESPIRATORY (INHALATION) at 21:55

## 2021-11-10 RX ADMIN — Medication 250 MILLIGRAM(S): at 23:38

## 2021-11-10 RX ADMIN — TIOTROPIUM BROMIDE 1 CAPSULE(S): 18 CAPSULE ORAL; RESPIRATORY (INHALATION) at 11:58

## 2021-11-10 RX ADMIN — ENOXAPARIN SODIUM 40 MILLIGRAM(S): 100 INJECTION SUBCUTANEOUS at 10:09

## 2021-11-10 RX ADMIN — Medication 1 TABLET(S): at 10:10

## 2021-11-10 RX ADMIN — CEFEPIME 100 MILLIGRAM(S): 1 INJECTION, POWDER, FOR SOLUTION INTRAMUSCULAR; INTRAVENOUS at 12:56

## 2021-11-10 RX ADMIN — SENNA PLUS 2 TABLET(S): 8.6 TABLET ORAL at 23:37

## 2021-11-10 RX ADMIN — QUETIAPINE FUMARATE 100 MILLIGRAM(S): 200 TABLET, FILM COATED ORAL at 23:39

## 2021-11-10 RX ADMIN — Medication 650 MILLIGRAM(S): at 12:59

## 2021-11-10 RX ADMIN — Medication 2 MILLIGRAM(S): at 23:39

## 2021-11-10 RX ADMIN — Medication 250 MILLIGRAM(S): at 10:09

## 2021-11-10 RX ADMIN — ALBUTEROL 2 PUFF(S): 90 AEROSOL, METERED ORAL at 11:58

## 2021-11-10 RX ADMIN — AZITHROMYCIN 500 MILLIGRAM(S): 500 TABLET, FILM COATED ORAL at 10:10

## 2021-11-10 RX ADMIN — PANTOPRAZOLE SODIUM 40 MILLIGRAM(S): 20 TABLET, DELAYED RELEASE ORAL at 05:34

## 2021-11-10 RX ADMIN — Medication 650 MILLIGRAM(S): at 01:15

## 2021-11-10 RX ADMIN — GABAPENTIN 400 MILLIGRAM(S): 400 CAPSULE ORAL at 05:34

## 2021-11-10 RX ADMIN — Medication 10 MILLIGRAM(S): at 23:39

## 2021-11-10 NOTE — CONSULT NOTE ADULT - SUBJECTIVE AND OBJECTIVE BOX
Patient is a 66y old  Male who presents with a chief complaint of Near Syncope    HPI:  65 y/o Male with h/o diastolic CHF (LVEF 55-60% 2020), A.fib s/p watchman device, obesity, COPD, HTN, neuropathy, s/p left BKA, old CVA, chronic respiratory failure s/p prior trach, h/o pneumothorax, h/o cardiac arrest was admitted on  for a near syncopal episode and his legs gave out; did not lose consciousness; landed on his back onto his head, wasn't able to get up afterwards, was lifted up with assistance from other people. He states that he had a few drinks last night; Patient denies any Chest pain; does have shortness of breath with exertion. In ER he was found in rapid A.fib and received ceftriaxone and azithromycin.     PMH: as above  PSH: as above  Meds: per reconciliation sheet, noted below  MEDICATIONS  (STANDING):  aspirin enteric coated 81 milliGRAM(s) Oral daily  azithromycin   Tablet 500 milliGRAM(s) Oral daily  budesonide 160 MICROgram(s)/formoterol 4.5 MICROgram(s) Inhaler 2 Puff(s) Inhalation two times a day  busPIRone 10 milliGRAM(s) Oral two times a day  cefTRIAXone Injectable. 1000 milliGRAM(s) IV Push every 24 hours  diltiazem    milliGRAM(s) Oral daily  diltiazem Infusion 5 mG/Hr (5 mL/Hr) IV Continuous <Continuous>  doxazosin 2 milliGRAM(s) Oral at bedtime  enoxaparin Injectable 40 milliGRAM(s) SubCutaneous daily  folic acid 1 milliGRAM(s) Oral daily  gabapentin 400 milliGRAM(s) Oral three times a day  influenza   Vaccine 0.5 milliLiter(s) IntraMuscular once  metoprolol tartrate 25 milliGRAM(s) Oral two times a day  multivitamin 1 Tablet(s) Oral daily  pantoprazole    Tablet 40 milliGRAM(s) Oral before breakfast  polyethylene glycol 3350 17 Gram(s) Oral daily  QUEtiapine 100 milliGRAM(s) Oral at bedtime  saccharomyces boulardii 250 milliGRAM(s) Oral two times a day  senna 2 Tablet(s) Oral at bedtime  sodium chloride 0.9%. 1000 milliLiter(s) (50 mL/Hr) IV Continuous <Continuous>  thiamine 100 milliGRAM(s) Oral daily  tiotropium 18 MICROgram(s) Capsule 1 Capsule(s) Inhalation daily    MEDICATIONS  (PRN):  ALBUTerol    90 MICROgram(s) HFA Inhaler 2 Puff(s) Inhalation every 6 hours PRN Shortness of Breath and/or Wheezing  LORazepam     Tablet 1 milliGRAM(s) Oral every 1 hour PRN CIWA-Ar score 8 or greater    Allergies    Ceclor (Rash)  Duricef (Rash)    Intolerances      Social: no smoking, h/o alcohol abuse, no illegal drugs; no recent travel, no exposure to TB  FAMILY HISTORY:    no history of premature cardiovascular disease in first degree relatives    ROS: the patient denies fever, no chills, no HA, no seizures, no dizziness, no sore throat, no nasal congestion, no blurry vision, no CP, no palpitations, has SOB, no cough, no abdominal pain, no diarrhea, no N/V, no dysuria, no leg pain, no claudication, no rash, no joint aches, no rectal pain or bleeding, no night sweats; has increased weakness  All other systems reviewed and are negative    Vital Signs Last 24 Hrs  T(C): 36.6 (10 Nov 2021 07:55), Max: 36.8 (2021 13:53)  T(F): 97.8 (10 Nov 2021 07:55), Max: 98.3 (2021 19:36)  HR: 82 (10 Nov 2021 07:55) (82 - 125)  BP: 112/58 (10 Nov 2021 07:55) (100/59 - 134/108)  BP(mean): 67 (2021 17:44) (67 - 111)  RR: 22 (10 Nov 2021 07:55) (11 - 24)  SpO2: 92% (10 Nov 2021 07:55) (92% - 97%)  Daily     Daily Weight in k.9 (2021 21:34)    PE:    Constitutional:  No acute distress  HEENT: NC/AT, EOMI, PERRLA, conjunctivae clear; ears and nose atraumatic; pharynx benign  Neck: supple; thyroid not palpable  Back: no tenderness  Respiratory: respiratory effort normal; few crackles at bases  Cardiovascular: S1S2 regular, no murmurs  Abdomen: soft, not tender, not distended, positive BS; no liver or spleen organomegaly  Genitourinary: no suprapubic tenderness  Lymphatic: no LN palpable  Musculoskeletal: no muscle tenderness, no joint swelling or tenderness  Extremities: no pedal edema  Neurological/ Psychiatric: AxOx3, judgement and insight normal; moving all extremities  Skin: no rashes; no palpable lesions    Labs: all available labs reviewed                         )-----------( 107      ( 10 Nov 2021 07:22 )             33.9     11-10    136  |  101  |  24<H>  ----------------------------<  105<H>  3.4<L>   |  30  |  0.93    Ca    8.5      10 Nov 2021 07:22  Phos  2.5     11-10  Mg     2.0     11-10    TPro  7.5  /  Alb  3.2<L>  /  TBili  1.1  /  DBili  x   /  AST  20  /  ALT  18  /  AlkPhos  106  09     LIVER FUNCTIONS - ( 2021 09:24 )  Alb: 3.2 g/dL / Pro: 7.5 gm/dL / ALK PHOS: 106 U/L / ALT: 18 U/L / AST: 20 U/L / GGT: x           Urinalysis Basic - ( 2021 16:40 )    Color: Yellow / Appearance: Clear / S.015 / pH: x  Gluc: x / Ketone: Negative  / Bili: Negative / Urobili: Negative mg/dL   Blood: x / Protein: 30 mg/dL / Nitrite: Negative   Leuk Esterase: Negative / RBC: 0-2 /HPF / WBC Negative   Sq Epi: x / Non Sq Epi: Occasional / Bacteria: Negative    COVID-19 PCR: NotDetec (21 @ 09:24)    Radiology: all available radiological tests reviewed    < from: Xray Chest 1 View- PORTABLE-Urgent (Xray Chest 1 View- PORTABLE-Urgent .) (21 @ 09:56) >  IMPRESSION: Bilateral lung parenchymal opacities are noted, right greater than left, with appearance most suggestive for bilateral pneumonia.  < end of copied text >    Advanced directives addressed: full resuscitation Patient is a 66y old  Male who presents with a chief complaint of Near Syncope    HPI:  67 y/o Male with h/o diastolic CHF (LVEF 55-60% 2020), A.fib s/p watchman device, obesity, COPD, HTN, neuropathy, s/p left BKA, old CVA, chronic respiratory failure s/p prior trach, h/o pneumothorax, h/o cardiac arrest was admitted on  for a near syncopal episode and his legs gave out; did not lose consciousness; landed on his back onto his head, wasn't able to get up afterwards, was lifted up with assistance from other people. He states that he had a few drinks last night; Patient denies any Chest pain; does have shortness of breath with exertion. In ER he was found in rapid A.fib and received ceftriaxone and azithromycin.   h/o PSAE and MRSA in pulmonary secretions in .    PMH: as above  PSH: as above  Meds: per reconciliation sheet, noted below  MEDICATIONS  (STANDING):  aspirin enteric coated 81 milliGRAM(s) Oral daily  azithromycin   Tablet 500 milliGRAM(s) Oral daily  budesonide 160 MICROgram(s)/formoterol 4.5 MICROgram(s) Inhaler 2 Puff(s) Inhalation two times a day  busPIRone 10 milliGRAM(s) Oral two times a day  cefTRIAXone Injectable. 1000 milliGRAM(s) IV Push every 24 hours  diltiazem    milliGRAM(s) Oral daily  diltiazem Infusion 5 mG/Hr (5 mL/Hr) IV Continuous <Continuous>  doxazosin 2 milliGRAM(s) Oral at bedtime  enoxaparin Injectable 40 milliGRAM(s) SubCutaneous daily  folic acid 1 milliGRAM(s) Oral daily  gabapentin 400 milliGRAM(s) Oral three times a day  influenza   Vaccine 0.5 milliLiter(s) IntraMuscular once  metoprolol tartrate 25 milliGRAM(s) Oral two times a day  multivitamin 1 Tablet(s) Oral daily  pantoprazole    Tablet 40 milliGRAM(s) Oral before breakfast  polyethylene glycol 3350 17 Gram(s) Oral daily  QUEtiapine 100 milliGRAM(s) Oral at bedtime  saccharomyces boulardii 250 milliGRAM(s) Oral two times a day  senna 2 Tablet(s) Oral at bedtime  sodium chloride 0.9%. 1000 milliLiter(s) (50 mL/Hr) IV Continuous <Continuous>  thiamine 100 milliGRAM(s) Oral daily  tiotropium 18 MICROgram(s) Capsule 1 Capsule(s) Inhalation daily    MEDICATIONS  (PRN):  ALBUTerol    90 MICROgram(s) HFA Inhaler 2 Puff(s) Inhalation every 6 hours PRN Shortness of Breath and/or Wheezing  LORazepam     Tablet 1 milliGRAM(s) Oral every 1 hour PRN CIWA-Ar score 8 or greater    Allergies    Ceclor (Rash)  Duricef (Rash)    Intolerances      Social: no smoking, h/o alcohol abuse, no illegal drugs; no recent travel, no exposure to TB  FAMILY HISTORY:    no history of premature cardiovascular disease in first degree relatives    ROS: the patient denies fever, no chills, no HA, no seizures, no dizziness, no sore throat, no nasal congestion, no blurry vision, no CP, no palpitations, has SOB, no cough, no abdominal pain, no diarrhea, no N/V, no dysuria, no leg pain, no claudication, no rash, no joint aches, no rectal pain or bleeding, no night sweats; has increased weakness  All other systems reviewed and are negative    Vital Signs Last 24 Hrs  T(C): 36.6 (10 Nov 2021 07:55), Max: 36.8 (2021 13:53)  T(F): 97.8 (10 Nov 2021 07:55), Max: 98.3 (2021 19:36)  HR: 82 (10 Nov 2021 07:55) (82 - 125)  BP: 112/58 (10 Nov 2021 07:55) (100/59 - 134/108)  BP(mean): 67 (2021 17:44) (67 - 111)  RR: 22 (10 Nov 2021 07:55) (11 - 24)  SpO2: 92% (10 Nov 2021 07:55) (92% - 97%)  Daily     Daily Weight in k.9 (2021 21:34)    PE:    Constitutional:  No acute distress  HEENT: NC/AT, EOMI, PERRLA, conjunctivae clear; ears and nose atraumatic; pharynx benign  Neck: supple; thyroid not palpable  Back: no tenderness  Respiratory: respiratory effort normal; few crackles at bases  Cardiovascular: S1S2 regular, no murmurs  Abdomen: soft, not tender, not distended, positive BS; no liver or spleen organomegaly  Genitourinary: no suprapubic tenderness  Lymphatic: no LN palpable  Musculoskeletal: no muscle tenderness, no joint swelling or tenderness  Extremities: no pedal edema  Neurological/ Psychiatric: AxOx3, judgement and insight normal; moving all extremities  Skin: no rashes; no palpable lesions    Labs: all available labs reviewed                         )-----------( 107      ( 10 Nov 2021 07:22 )             33.9     11-10    136  |  101  |  24<H>  ----------------------------<  105<H>  3.4<L>   |  30  |  0.93    Ca    8.5      10 Nov 2021 07:22  Phos  2.5     11-10  Mg     2.0     11-10    TPro  7.5  /  Alb  3.2<L>  /  TBili  1.1  /  DBili  x   /  AST  20  /  ALT  18  /  AlkPhos  106       LIVER FUNCTIONS - ( 2021 09:24 )  Alb: 3.2 g/dL / Pro: 7.5 gm/dL / ALK PHOS: 106 U/L / ALT: 18 U/L / AST: 20 U/L / GGT: x           Urinalysis Basic - ( 2021 16:40 )    Color: Yellow / Appearance: Clear / S.015 / pH: x  Gluc: x / Ketone: Negative  / Bili: Negative / Urobili: Negative mg/dL   Blood: x / Protein: 30 mg/dL / Nitrite: Negative   Leuk Esterase: Negative / RBC: 0-2 /HPF / WBC Negative   Sq Epi: x / Non Sq Epi: Occasional / Bacteria: Negative    COVID-19 PCR: NotDetec (21 @ 09:24)    Radiology: all available radiological tests reviewed    < from: Xray Chest 1 View- PORTABLE-Urgent (Xray Chest 1 View- PORTABLE-Urgent .) (21 @ 09:56) >  IMPRESSION: Bilateral lung parenchymal opacities are noted, right greater than left, with appearance most suggestive for bilateral pneumonia.  < end of copied text >    Advanced directives addressed: full resuscitation

## 2021-11-10 NOTE — PROVIDER CONTACT NOTE (OTHER) - BACKGROUND
pt admitted for afib and pneumonia. has Hx of amputation in L leg. had near syncope and legs gave out and hit his head.

## 2021-11-10 NOTE — CONSULT NOTE ADULT - ASSESSMENT
67 y/o Male with h/o diastolic CHF (LVEF 55-60% 5/2020), A.fib s/p watchman device, obesity, COPD, HTN, neuropathy, s/p left BKA, old CVA, chronic respiratory failure s/p prior trach, h/o pneumothorax, h/o cardiac arrest was admitted on 11/9 for a near syncopal episode and his legs gave out; did not lose consciousness; landed on his back onto his head, wasn't able to get up afterwards, was lifted up with assistance from other people. He states that he had a few drinks last night; Patient denies any Chest pain; does have shortness of breath with exertion. In ER he was found in rapid A.fib and received ceftriaxone and azithromycin.     1. B/l pneumonia. Chronic respiratory failure.    65 y/o Male with h/o diastolic CHF (LVEF 55-60% 5/2020), A.fib s/p watchman device, obesity, COPD, HTN, neuropathy, s/p left BKA, old CVA, chronic respiratory failure s/p prior trach, h/o pneumothorax, h/o cardiac arrest was admitted on 11/9 for a near syncopal episode and his legs gave out; did not lose consciousness; landed on his back onto his head, wasn't able to get up afterwards, was lifted up with assistance from other people. He states that he had a few drinks last night; Patient denies any Chest pain; does have shortness of breath with exertion. In ER he was found in rapid A.fib and received ceftriaxone and azithromycin.     1. B/l pneumonia. Acute on chronic respiratory failure. Obesity. Diastolic CHF.   -h/o MDRO organisms in pulmonary secretions  -obtain BC x 2, sputum c/s  -start cefepime 2 gm IV q12h and azithromycin 500 mg PO qd  -reason for abx use and side effects reviewed with patient; monitor BMP  -respiratory care  -old chart reviewed to assess prior cultures  -monitor temps  -f/u CBC  -supportive care  2. Other issues:   -care per medicine

## 2021-11-11 DIAGNOSIS — J18.9 PNEUMONIA, UNSPECIFIED ORGANISM: ICD-10-CM

## 2021-11-11 DIAGNOSIS — I50.32 CHRONIC DIASTOLIC (CONGESTIVE) HEART FAILURE: ICD-10-CM

## 2021-11-11 DIAGNOSIS — I48.91 UNSPECIFIED ATRIAL FIBRILLATION: ICD-10-CM

## 2021-11-11 DIAGNOSIS — I10 ESSENTIAL (PRIMARY) HYPERTENSION: ICD-10-CM

## 2021-11-11 PROCEDURE — 99232 SBSQ HOSP IP/OBS MODERATE 35: CPT

## 2021-11-11 PROCEDURE — 99233 SBSQ HOSP IP/OBS HIGH 50: CPT

## 2021-11-11 RX ADMIN — Medication 81 MILLIGRAM(S): at 10:17

## 2021-11-11 RX ADMIN — Medication 10 MILLIGRAM(S): at 10:17

## 2021-11-11 RX ADMIN — SENNA PLUS 2 TABLET(S): 8.6 TABLET ORAL at 20:54

## 2021-11-11 RX ADMIN — Medication 100 MILLIGRAM(S): at 10:16

## 2021-11-11 RX ADMIN — Medication 650 MILLIGRAM(S): at 12:28

## 2021-11-11 RX ADMIN — Medication 250 MILLIGRAM(S): at 10:16

## 2021-11-11 RX ADMIN — GABAPENTIN 400 MILLIGRAM(S): 400 CAPSULE ORAL at 20:58

## 2021-11-11 RX ADMIN — POLYETHYLENE GLYCOL 3350 17 GRAM(S): 17 POWDER, FOR SOLUTION ORAL at 10:17

## 2021-11-11 RX ADMIN — Medication 250 MILLIGRAM(S): at 20:54

## 2021-11-11 RX ADMIN — Medication 1 TABLET(S): at 10:17

## 2021-11-11 RX ADMIN — Medication 40 MILLIGRAM(S): at 05:23

## 2021-11-11 RX ADMIN — TIOTROPIUM BROMIDE 1 CAPSULE(S): 18 CAPSULE ORAL; RESPIRATORY (INHALATION) at 08:19

## 2021-11-11 RX ADMIN — Medication 40 MILLIGRAM(S): at 20:58

## 2021-11-11 RX ADMIN — Medication 120 MILLIGRAM(S): at 10:17

## 2021-11-11 RX ADMIN — CEFEPIME 100 MILLIGRAM(S): 1 INJECTION, POWDER, FOR SOLUTION INTRAMUSCULAR; INTRAVENOUS at 22:24

## 2021-11-11 RX ADMIN — BUDESONIDE AND FORMOTEROL FUMARATE DIHYDRATE 2 PUFF(S): 160; 4.5 AEROSOL RESPIRATORY (INHALATION) at 20:07

## 2021-11-11 RX ADMIN — AZITHROMYCIN 500 MILLIGRAM(S): 500 TABLET, FILM COATED ORAL at 10:19

## 2021-11-11 RX ADMIN — Medication 1 MILLIGRAM(S): at 10:17

## 2021-11-11 RX ADMIN — Medication 25 MILLIGRAM(S): at 20:54

## 2021-11-11 RX ADMIN — ENOXAPARIN SODIUM 40 MILLIGRAM(S): 100 INJECTION SUBCUTANEOUS at 10:17

## 2021-11-11 RX ADMIN — Medication 2 MILLIGRAM(S): at 20:55

## 2021-11-11 RX ADMIN — Medication 1 MILLIGRAM(S): at 20:52

## 2021-11-11 RX ADMIN — Medication 25 MILLIGRAM(S): at 10:17

## 2021-11-11 RX ADMIN — GABAPENTIN 400 MILLIGRAM(S): 400 CAPSULE ORAL at 05:23

## 2021-11-11 RX ADMIN — Medication 10 MILLIGRAM(S): at 20:55

## 2021-11-11 RX ADMIN — PANTOPRAZOLE SODIUM 40 MILLIGRAM(S): 20 TABLET, DELAYED RELEASE ORAL at 06:05

## 2021-11-11 RX ADMIN — BUDESONIDE AND FORMOTEROL FUMARATE DIHYDRATE 2 PUFF(S): 160; 4.5 AEROSOL RESPIRATORY (INHALATION) at 08:19

## 2021-11-11 RX ADMIN — Medication 40 MILLIGRAM(S): at 12:28

## 2021-11-11 RX ADMIN — QUETIAPINE FUMARATE 100 MILLIGRAM(S): 200 TABLET, FILM COATED ORAL at 20:55

## 2021-11-11 RX ADMIN — CEFEPIME 100 MILLIGRAM(S): 1 INJECTION, POWDER, FOR SOLUTION INTRAMUSCULAR; INTRAVENOUS at 12:27

## 2021-11-11 RX ADMIN — GABAPENTIN 400 MILLIGRAM(S): 400 CAPSULE ORAL at 12:28

## 2021-11-11 RX ADMIN — Medication 650 MILLIGRAM(S): at 12:58

## 2021-11-11 NOTE — ADVANCED PRACTICE NURSE CONSULT - ASSESSMENT
This is a 66 year old male, admitted to the hospital with Atrial Fibrilation and syncope and a past medical history of  diastolic CHF (LVEF 55-60% 5/2020), s/p watchman device, obesity, COPD, HTN, neuropathy, s/p left BKA, h/o CVA, h/o trach s/p removal, ETOH abuse, pneumothorax.   Assessment of right lower extremity, +2 edema and noted wound to right distal shin measuring 3cm x 2.6cm x 0.1cm and a scab on the medical lower extremity patient reports he has had these wounds and has been treated in wound with outpatient wound care. Used Sween Lotion to Right lower extremity RT Dry skin. I educated patient that the dry skin can put him at risk of acquiring another wound.  Place Adaptic, Xeroform, nonStick Telfa and Applied a Light ace bandage to right lower extremity .  Left leg with BKA and stump skin with dryness to distal and anterior region but intact.

## 2021-11-11 NOTE — ADVANCED PRACTICE NURSE CONSULT - RECOMMEDATIONS
1) Ensure Left Leg Stump is elevated  2) Patient is able to turn and position self, educate the importance with edema and history of wounds   3) Recommend to Right Lower extremity; place lotion (Sween while in hospital) Adaptic, Xeroform, nonstick telfa and light ace wrap.

## 2021-11-12 PROBLEM — D64.9 ANEMIA: Status: ACTIVE | Noted: 2018-08-29

## 2021-11-12 LAB
ANION GAP SERPL CALC-SCNC: 6 MMOL/L — SIGNIFICANT CHANGE UP (ref 5–17)
BUN SERPL-MCNC: 18 MG/DL — SIGNIFICANT CHANGE UP (ref 7–23)
CALCIUM SERPL-MCNC: 9.5 MG/DL — SIGNIFICANT CHANGE UP (ref 8.5–10.1)
CHLORIDE SERPL-SCNC: 105 MMOL/L — SIGNIFICANT CHANGE UP (ref 96–108)
CO2 SERPL-SCNC: 27 MMOL/L — SIGNIFICANT CHANGE UP (ref 22–31)
CREAT SERPL-MCNC: 0.86 MG/DL — SIGNIFICANT CHANGE UP (ref 0.5–1.3)
GLUCOSE SERPL-MCNC: 169 MG/DL — HIGH (ref 70–99)
GRAM STN FLD: SIGNIFICANT CHANGE UP
HCT VFR BLD CALC: 36.6 % — LOW (ref 39–50)
HGB BLD-MCNC: 11.8 G/DL — LOW (ref 13–17)
MCHC RBC-ENTMCNC: 30.9 PG — SIGNIFICANT CHANGE UP (ref 27–34)
MCHC RBC-ENTMCNC: 32.2 GM/DL — SIGNIFICANT CHANGE UP (ref 32–36)
MCV RBC AUTO: 95.8 FL — SIGNIFICANT CHANGE UP (ref 80–100)
PLATELET # BLD AUTO: 154 K/UL — SIGNIFICANT CHANGE UP (ref 150–400)
POTASSIUM SERPL-MCNC: 3.9 MMOL/L — SIGNIFICANT CHANGE UP (ref 3.5–5.3)
POTASSIUM SERPL-SCNC: 3.9 MMOL/L — SIGNIFICANT CHANGE UP (ref 3.5–5.3)
RBC # BLD: 3.82 M/UL — LOW (ref 4.2–5.8)
RBC # FLD: 13.6 % — SIGNIFICANT CHANGE UP (ref 10.3–14.5)
SODIUM SERPL-SCNC: 138 MMOL/L — SIGNIFICANT CHANGE UP (ref 135–145)
SPECIMEN SOURCE: SIGNIFICANT CHANGE UP
WBC # BLD: 11.43 K/UL — HIGH (ref 3.8–10.5)
WBC # FLD AUTO: 11.43 K/UL — HIGH (ref 3.8–10.5)

## 2021-11-12 PROCEDURE — 99233 SBSQ HOSP IP/OBS HIGH 50: CPT

## 2021-11-12 PROCEDURE — 99232 SBSQ HOSP IP/OBS MODERATE 35: CPT

## 2021-11-12 RX ORDER — SPIRONOLACTONE 25 MG/1
25 TABLET, FILM COATED ORAL DAILY
Refills: 0 | Status: DISCONTINUED | OUTPATIENT
Start: 2021-11-12 | End: 2021-11-15

## 2021-11-12 RX ADMIN — GABAPENTIN 400 MILLIGRAM(S): 400 CAPSULE ORAL at 21:22

## 2021-11-12 RX ADMIN — Medication 81 MILLIGRAM(S): at 11:14

## 2021-11-12 RX ADMIN — Medication 650 MILLIGRAM(S): at 22:05

## 2021-11-12 RX ADMIN — Medication 25 MILLIGRAM(S): at 21:20

## 2021-11-12 RX ADMIN — Medication 120 MILLIGRAM(S): at 11:19

## 2021-11-12 RX ADMIN — CEFEPIME 100 MILLIGRAM(S): 1 INJECTION, POWDER, FOR SOLUTION INTRAMUSCULAR; INTRAVENOUS at 11:20

## 2021-11-12 RX ADMIN — GABAPENTIN 400 MILLIGRAM(S): 400 CAPSULE ORAL at 05:23

## 2021-11-12 RX ADMIN — BUDESONIDE AND FORMOTEROL FUMARATE DIHYDRATE 2 PUFF(S): 160; 4.5 AEROSOL RESPIRATORY (INHALATION) at 09:11

## 2021-11-12 RX ADMIN — Medication 40 MILLIGRAM(S): at 05:23

## 2021-11-12 RX ADMIN — TIOTROPIUM BROMIDE 1 CAPSULE(S): 18 CAPSULE ORAL; RESPIRATORY (INHALATION) at 09:11

## 2021-11-12 RX ADMIN — Medication 1 TABLET(S): at 11:20

## 2021-11-12 RX ADMIN — Medication 10 MILLIGRAM(S): at 21:19

## 2021-11-12 RX ADMIN — Medication 250 MILLIGRAM(S): at 11:21

## 2021-11-12 RX ADMIN — Medication 10 MILLIGRAM(S): at 11:18

## 2021-11-12 RX ADMIN — Medication 2 MILLIGRAM(S): at 21:19

## 2021-11-12 RX ADMIN — Medication 25 MILLIGRAM(S): at 11:20

## 2021-11-12 RX ADMIN — ENOXAPARIN SODIUM 40 MILLIGRAM(S): 100 INJECTION SUBCUTANEOUS at 11:22

## 2021-11-12 RX ADMIN — ALBUTEROL 2 PUFF(S): 90 AEROSOL, METERED ORAL at 09:11

## 2021-11-12 RX ADMIN — Medication 1 MILLIGRAM(S): at 11:21

## 2021-11-12 RX ADMIN — Medication 100 MILLIGRAM(S): at 11:22

## 2021-11-12 RX ADMIN — PANTOPRAZOLE SODIUM 40 MILLIGRAM(S): 20 TABLET, DELAYED RELEASE ORAL at 05:41

## 2021-11-12 RX ADMIN — Medication 40 MILLIGRAM(S): at 18:34

## 2021-11-12 RX ADMIN — SPIRONOLACTONE 25 MILLIGRAM(S): 25 TABLET, FILM COATED ORAL at 11:22

## 2021-11-12 RX ADMIN — Medication 1 MILLIGRAM(S): at 02:14

## 2021-11-12 RX ADMIN — Medication 650 MILLIGRAM(S): at 22:35

## 2021-11-12 RX ADMIN — QUETIAPINE FUMARATE 100 MILLIGRAM(S): 200 TABLET, FILM COATED ORAL at 21:19

## 2021-11-12 RX ADMIN — GABAPENTIN 400 MILLIGRAM(S): 400 CAPSULE ORAL at 14:19

## 2021-11-12 RX ADMIN — CEFEPIME 100 MILLIGRAM(S): 1 INJECTION, POWDER, FOR SOLUTION INTRAMUSCULAR; INTRAVENOUS at 22:06

## 2021-11-12 RX ADMIN — Medication 250 MILLIGRAM(S): at 21:19

## 2021-11-12 RX ADMIN — POLYETHYLENE GLYCOL 3350 17 GRAM(S): 17 POWDER, FOR SOLUTION ORAL at 11:20

## 2021-11-12 RX ADMIN — SENNA PLUS 2 TABLET(S): 8.6 TABLET ORAL at 21:20

## 2021-11-12 NOTE — HISTORY OF PRESENT ILLNESS
[de-identified] : 65yo male with etoh liver disease\par \par details of prior history are unclear\par He has swelling and edema and follows with pulmonary and cardiology

## 2021-11-12 NOTE — REVIEW OF SYSTEMS
[Feeling Poorly] : feeling poorly [Feeling Tired] : feeling tired [Palpitations] : palpitations [Shortness Of Breath] : shortness of breath [SOB on Exertion] : shortness of breath during exertion [Limb Swelling] : limb swelling [Negative] : Heme/Lymph

## 2021-11-12 NOTE — ASSESSMENT
[FreeTextEntry1] : 67yo male with anemia, etoh abuse cirrhosis\par \par Encouraged etoh cessation\par Would need cardiology and pulmonary clearance prior to endoscopic evaluation for anemia\par f/u 1 month\par diuretics

## 2021-11-13 LAB
ANION GAP SERPL CALC-SCNC: 4 MMOL/L — LOW (ref 5–17)
BUN SERPL-MCNC: 27 MG/DL — HIGH (ref 7–23)
CALCIUM SERPL-MCNC: 9.3 MG/DL — SIGNIFICANT CHANGE UP (ref 8.5–10.1)
CHLORIDE SERPL-SCNC: 107 MMOL/L — SIGNIFICANT CHANGE UP (ref 96–108)
CO2 SERPL-SCNC: 29 MMOL/L — SIGNIFICANT CHANGE UP (ref 22–31)
CREAT SERPL-MCNC: 0.8 MG/DL — SIGNIFICANT CHANGE UP (ref 0.5–1.3)
CULTURE RESULTS: SIGNIFICANT CHANGE UP
GLUCOSE SERPL-MCNC: 147 MG/DL — HIGH (ref 70–99)
HCT VFR BLD CALC: 36.9 % — LOW (ref 39–50)
HGB BLD-MCNC: 12.2 G/DL — LOW (ref 13–17)
MCHC RBC-ENTMCNC: 31.8 PG — SIGNIFICANT CHANGE UP (ref 27–34)
MCHC RBC-ENTMCNC: 33.1 GM/DL — SIGNIFICANT CHANGE UP (ref 32–36)
MCV RBC AUTO: 96.1 FL — SIGNIFICANT CHANGE UP (ref 80–100)
NT-PROBNP SERPL-SCNC: 3031 PG/ML — HIGH (ref 0–125)
PLATELET # BLD AUTO: 133 K/UL — LOW (ref 150–400)
POTASSIUM SERPL-MCNC: 4.3 MMOL/L — SIGNIFICANT CHANGE UP (ref 3.5–5.3)
POTASSIUM SERPL-SCNC: 4.3 MMOL/L — SIGNIFICANT CHANGE UP (ref 3.5–5.3)
RBC # BLD: 3.84 M/UL — LOW (ref 4.2–5.8)
RBC # FLD: 13.6 % — SIGNIFICANT CHANGE UP (ref 10.3–14.5)
SARS-COV-2 RNA SPEC QL NAA+PROBE: SIGNIFICANT CHANGE UP
SODIUM SERPL-SCNC: 140 MMOL/L — SIGNIFICANT CHANGE UP (ref 135–145)
SPECIMEN SOURCE: SIGNIFICANT CHANGE UP
WBC # BLD: 9.82 K/UL — SIGNIFICANT CHANGE UP (ref 3.8–10.5)
WBC # FLD AUTO: 9.82 K/UL — SIGNIFICANT CHANGE UP (ref 3.8–10.5)

## 2021-11-13 PROCEDURE — 99232 SBSQ HOSP IP/OBS MODERATE 35: CPT

## 2021-11-13 RX ORDER — FUROSEMIDE 40 MG
80 TABLET ORAL
Refills: 0 | Status: DISCONTINUED | OUTPATIENT
Start: 2021-11-13 | End: 2021-11-15

## 2021-11-13 RX ADMIN — Medication 25 MILLIGRAM(S): at 21:18

## 2021-11-13 RX ADMIN — GABAPENTIN 400 MILLIGRAM(S): 400 CAPSULE ORAL at 05:57

## 2021-11-13 RX ADMIN — Medication 40 MILLIGRAM(S): at 17:43

## 2021-11-13 RX ADMIN — Medication 100 MILLIGRAM(S): at 09:24

## 2021-11-13 RX ADMIN — Medication 80 MILLIGRAM(S): at 17:43

## 2021-11-13 RX ADMIN — ALBUTEROL 2 PUFF(S): 90 AEROSOL, METERED ORAL at 20:38

## 2021-11-13 RX ADMIN — SENNA PLUS 2 TABLET(S): 8.6 TABLET ORAL at 21:18

## 2021-11-13 RX ADMIN — CEFEPIME 100 MILLIGRAM(S): 1 INJECTION, POWDER, FOR SOLUTION INTRAMUSCULAR; INTRAVENOUS at 23:26

## 2021-11-13 RX ADMIN — Medication 250 MILLIGRAM(S): at 21:19

## 2021-11-13 RX ADMIN — SPIRONOLACTONE 25 MILLIGRAM(S): 25 TABLET, FILM COATED ORAL at 09:24

## 2021-11-13 RX ADMIN — GABAPENTIN 400 MILLIGRAM(S): 400 CAPSULE ORAL at 13:39

## 2021-11-13 RX ADMIN — Medication 250 MILLIGRAM(S): at 09:20

## 2021-11-13 RX ADMIN — BUDESONIDE AND FORMOTEROL FUMARATE DIHYDRATE 2 PUFF(S): 160; 4.5 AEROSOL RESPIRATORY (INHALATION) at 20:36

## 2021-11-13 RX ADMIN — Medication 81 MILLIGRAM(S): at 09:18

## 2021-11-13 RX ADMIN — Medication 1 MILLIGRAM(S): at 11:03

## 2021-11-13 RX ADMIN — ENOXAPARIN SODIUM 40 MILLIGRAM(S): 100 INJECTION SUBCUTANEOUS at 11:03

## 2021-11-13 RX ADMIN — Medication 650 MILLIGRAM(S): at 15:55

## 2021-11-13 RX ADMIN — Medication 120 MILLIGRAM(S): at 09:19

## 2021-11-13 RX ADMIN — BUDESONIDE AND FORMOTEROL FUMARATE DIHYDRATE 2 PUFF(S): 160; 4.5 AEROSOL RESPIRATORY (INHALATION) at 09:11

## 2021-11-13 RX ADMIN — Medication 25 MILLIGRAM(S): at 09:19

## 2021-11-13 RX ADMIN — PANTOPRAZOLE SODIUM 40 MILLIGRAM(S): 20 TABLET, DELAYED RELEASE ORAL at 05:57

## 2021-11-13 RX ADMIN — Medication 1 TABLET(S): at 09:20

## 2021-11-13 RX ADMIN — QUETIAPINE FUMARATE 100 MILLIGRAM(S): 200 TABLET, FILM COATED ORAL at 21:19

## 2021-11-13 RX ADMIN — GABAPENTIN 400 MILLIGRAM(S): 400 CAPSULE ORAL at 21:19

## 2021-11-13 RX ADMIN — Medication 10 MILLIGRAM(S): at 09:18

## 2021-11-13 RX ADMIN — Medication 2 MILLIGRAM(S): at 21:18

## 2021-11-13 RX ADMIN — Medication 10 MILLIGRAM(S): at 21:19

## 2021-11-13 RX ADMIN — CEFEPIME 100 MILLIGRAM(S): 1 INJECTION, POWDER, FOR SOLUTION INTRAMUSCULAR; INTRAVENOUS at 11:00

## 2021-11-13 RX ADMIN — TIOTROPIUM BROMIDE 1 CAPSULE(S): 18 CAPSULE ORAL; RESPIRATORY (INHALATION) at 09:12

## 2021-11-13 RX ADMIN — Medication 40 MILLIGRAM(S): at 05:56

## 2021-11-13 NOTE — PROGRESS NOTE ADULT - PROBLEM SELECTOR PLAN 3
Normotensive. Continue metoprolol and diltiazem. Will discontinue dilt IV and monitor.
Stable; diuretics PRN

## 2021-11-13 NOTE — PROGRESS NOTE ADULT - PROBLEM SELECTOR PLAN 1
Satisfactory rate control and s/p WatchMan device implantation; continue diltiazem  + metoprolol. Satisfactory rate control and s/p WatchMan device implantation; continue diltiazem  + metoprolol. May DC telemetry

## 2021-11-13 NOTE — PROGRESS NOTE ADULT - PROBLEM SELECTOR PROBLEM 3
Chronic diastolic heart failure
R/O Hypertension
Chronic diastolic heart failure
Chronic diastolic heart failure

## 2021-11-13 NOTE — PROGRESS NOTE ADULT - PROBLEM SELECTOR PLAN 2
Satisfactory control.
Satisfactory control.
Restart diuresis if weight or symptoms suggest overload. Pt is minimally symptomatic.
Satisfactory control.

## 2021-11-13 NOTE — PROGRESS NOTE ADULT - PROBLEM SELECTOR PLAN 4
Bilateral pneumonia - receiving azithromycin  + cefepime.

## 2021-11-14 PROCEDURE — 99232 SBSQ HOSP IP/OBS MODERATE 35: CPT

## 2021-11-14 RX ADMIN — POLYETHYLENE GLYCOL 3350 17 GRAM(S): 17 POWDER, FOR SOLUTION ORAL at 10:52

## 2021-11-14 RX ADMIN — Medication 10 MILLIGRAM(S): at 10:51

## 2021-11-14 RX ADMIN — SPIRONOLACTONE 25 MILLIGRAM(S): 25 TABLET, FILM COATED ORAL at 10:51

## 2021-11-14 RX ADMIN — Medication 81 MILLIGRAM(S): at 10:50

## 2021-11-14 RX ADMIN — Medication 1 TABLET(S): at 10:51

## 2021-11-14 RX ADMIN — ALBUTEROL 2 PUFF(S): 90 AEROSOL, METERED ORAL at 10:03

## 2021-11-14 RX ADMIN — Medication 250 MILLIGRAM(S): at 21:05

## 2021-11-14 RX ADMIN — GABAPENTIN 400 MILLIGRAM(S): 400 CAPSULE ORAL at 05:51

## 2021-11-14 RX ADMIN — PANTOPRAZOLE SODIUM 40 MILLIGRAM(S): 20 TABLET, DELAYED RELEASE ORAL at 06:24

## 2021-11-14 RX ADMIN — Medication 25 MILLIGRAM(S): at 10:51

## 2021-11-14 RX ADMIN — ENOXAPARIN SODIUM 40 MILLIGRAM(S): 100 INJECTION SUBCUTANEOUS at 11:15

## 2021-11-14 RX ADMIN — BUDESONIDE AND FORMOTEROL FUMARATE DIHYDRATE 2 PUFF(S): 160; 4.5 AEROSOL RESPIRATORY (INHALATION) at 21:45

## 2021-11-14 RX ADMIN — BUDESONIDE AND FORMOTEROL FUMARATE DIHYDRATE 2 PUFF(S): 160; 4.5 AEROSOL RESPIRATORY (INHALATION) at 10:08

## 2021-11-14 RX ADMIN — Medication 80 MILLIGRAM(S): at 17:42

## 2021-11-14 RX ADMIN — CEFEPIME 100 MILLIGRAM(S): 1 INJECTION, POWDER, FOR SOLUTION INTRAMUSCULAR; INTRAVENOUS at 10:52

## 2021-11-14 RX ADMIN — Medication 250 MILLIGRAM(S): at 10:51

## 2021-11-14 RX ADMIN — Medication 120 MILLIGRAM(S): at 10:52

## 2021-11-14 RX ADMIN — Medication 10 MILLIGRAM(S): at 21:06

## 2021-11-14 RX ADMIN — Medication 40 MILLIGRAM(S): at 05:51

## 2021-11-14 RX ADMIN — TIOTROPIUM BROMIDE 1 CAPSULE(S): 18 CAPSULE ORAL; RESPIRATORY (INHALATION) at 10:03

## 2021-11-14 RX ADMIN — QUETIAPINE FUMARATE 100 MILLIGRAM(S): 200 TABLET, FILM COATED ORAL at 21:06

## 2021-11-14 RX ADMIN — Medication 25 MILLIGRAM(S): at 21:05

## 2021-11-14 RX ADMIN — Medication 100 MILLIGRAM(S): at 10:53

## 2021-11-14 RX ADMIN — Medication 80 MILLIGRAM(S): at 10:51

## 2021-11-14 RX ADMIN — Medication 1 MILLIGRAM(S): at 11:15

## 2021-11-14 RX ADMIN — GABAPENTIN 400 MILLIGRAM(S): 400 CAPSULE ORAL at 14:19

## 2021-11-14 RX ADMIN — CEFEPIME 100 MILLIGRAM(S): 1 INJECTION, POWDER, FOR SOLUTION INTRAMUSCULAR; INTRAVENOUS at 23:19

## 2021-11-14 RX ADMIN — GABAPENTIN 400 MILLIGRAM(S): 400 CAPSULE ORAL at 21:05

## 2021-11-14 RX ADMIN — Medication 2 MILLIGRAM(S): at 21:05

## 2021-11-15 ENCOUNTER — TRANSCRIPTION ENCOUNTER (OUTPATIENT)
Age: 66
End: 2021-11-15

## 2021-11-15 VITALS
SYSTOLIC BLOOD PRESSURE: 124 MMHG | TEMPERATURE: 98 F | HEART RATE: 70 BPM | OXYGEN SATURATION: 95 % | RESPIRATION RATE: 18 BRPM | DIASTOLIC BLOOD PRESSURE: 61 MMHG

## 2021-11-15 DIAGNOSIS — J44.1 CHRONIC OBSTRUCTIVE PULMONARY DISEASE WITH (ACUTE) EXACERBATION: ICD-10-CM

## 2021-11-15 PROCEDURE — 99239 HOSP IP/OBS DSCHRG MGMT >30: CPT

## 2021-11-15 RX ORDER — ACETAMINOPHEN 500 MG
2 TABLET ORAL
Qty: 0 | Refills: 0 | DISCHARGE

## 2021-11-15 RX ADMIN — TIOTROPIUM BROMIDE 1 CAPSULE(S): 18 CAPSULE ORAL; RESPIRATORY (INHALATION) at 08:25

## 2021-11-15 RX ADMIN — GABAPENTIN 400 MILLIGRAM(S): 400 CAPSULE ORAL at 14:17

## 2021-11-15 RX ADMIN — Medication 120 MILLIGRAM(S): at 11:00

## 2021-11-15 RX ADMIN — POLYETHYLENE GLYCOL 3350 17 GRAM(S): 17 POWDER, FOR SOLUTION ORAL at 10:59

## 2021-11-15 RX ADMIN — ENOXAPARIN SODIUM 40 MILLIGRAM(S): 100 INJECTION SUBCUTANEOUS at 12:34

## 2021-11-15 RX ADMIN — Medication 25 MILLIGRAM(S): at 11:02

## 2021-11-15 RX ADMIN — BUDESONIDE AND FORMOTEROL FUMARATE DIHYDRATE 2 PUFF(S): 160; 4.5 AEROSOL RESPIRATORY (INHALATION) at 08:25

## 2021-11-15 RX ADMIN — Medication 80 MILLIGRAM(S): at 10:59

## 2021-11-15 RX ADMIN — CEFEPIME 100 MILLIGRAM(S): 1 INJECTION, POWDER, FOR SOLUTION INTRAMUSCULAR; INTRAVENOUS at 12:34

## 2021-11-15 RX ADMIN — Medication 40 MILLIGRAM(S): at 11:01

## 2021-11-15 RX ADMIN — GABAPENTIN 400 MILLIGRAM(S): 400 CAPSULE ORAL at 06:03

## 2021-11-15 RX ADMIN — Medication 10 MILLIGRAM(S): at 10:59

## 2021-11-15 RX ADMIN — Medication 250 MILLIGRAM(S): at 11:00

## 2021-11-15 RX ADMIN — Medication 1 TABLET(S): at 11:00

## 2021-11-15 RX ADMIN — SPIRONOLACTONE 25 MILLIGRAM(S): 25 TABLET, FILM COATED ORAL at 11:00

## 2021-11-15 RX ADMIN — Medication 81 MILLIGRAM(S): at 11:01

## 2021-11-15 RX ADMIN — Medication 100 MILLIGRAM(S): at 11:00

## 2021-11-15 RX ADMIN — Medication 1 MILLIGRAM(S): at 12:34

## 2021-11-15 RX ADMIN — PANTOPRAZOLE SODIUM 40 MILLIGRAM(S): 20 TABLET, DELAYED RELEASE ORAL at 06:02

## 2021-11-15 NOTE — DISCHARGE NOTE PROVIDER - NSDCFUSCHEDAPPT_GEN_ALL_CORE_FT
STONE DIAZ ; 11/24/2021 ; [facility] [address] [facility] [place]  DASHAWNSTONE ; 12/06/2021 ; [facility] [address] [facility] ; 12/14/2021 ; [facility] [address] [facility]  STONE DIAZ ; 01/19/2022 ; [facility] Card[facility] [address] [facility] STONE DIAZ ; 12/06/2021 ; [facility] Int[facility] [address]   DASHAWNSTONE ; 12/14/2021 ; [facility]  STONE DIAZ ; 01/19/2022 ; [facility] Card[facility] [address] [facility]

## 2021-11-15 NOTE — DISCHARGE NOTE PROVIDER - NSDCMRMEDTOKEN_GEN_ALL_CORE_FT
acetaminophen 325 mg oral tablet: 2 tab(s) orally 3 times a day (after meals), As Needed  albuterol 2.5 mg/3 mL (0.083%) inhalation solution: 3 milliliter(s) inhaled every 4 hours, As Needed for wheezing  albuterol 90 mcg/inh inhalation aerosol: 2 puff(s) inhaled every 4 hours, As Needed for wheezing  Aspirin Enteric Coated 81 mg oral delayed release tablet: 1 tab(s) orally once a day  budesonide-formoterol 160 mcg-4.5 mcg/inh inhalation aerosol: 2 puff(s) inhaled 2 times a day  busPIRone 10 mg oral tablet: 1 tab(s) orally 2 times a day  dilTIAZem 120 mg/24 hours oral capsule, extended release: 1 cap(s) orally once a day  doxazosin 2 mg oral tablet: 1 tab(s) orally once a day (at bedtime)  Florastor 250 mg oral capsule: 1 cap(s) orally 2 times a day  gabapentin 400 mg oral capsule: 1 cap(s) orally 3 times a day  GlycoLax oral powder for reconstitution: 17 gram(s) orally once a day  Lasix 80 mg oral tablet: 1 tab(s) orally 2 times a day  Metoprolol Tartrate 25 mg oral tablet: 1 tab(s) orally 2 times a day  omeprazole 40 mg oral delayed release capsule: 1 cap(s) orally once a day  predniSONE 10 mg oral tablet: 4 tab(s) orally once a day x 2d  3 tb po daily x 2d  2 tb po daily x 2d  1tb po daily x 2d  QUEtiapine 50 mg oral tablet: 2 tab(s) orally once a day (at bedtime)  senna oral tablet: 2 tab(s) orally once a day (at bedtime)  Spiriva 18 mcg inhalation capsule: 1 cap(s) inhaled once a day  spironolactone 25 mg oral tablet: 1 tab(s) orally once a day  thiamine 100 mg oral tablet: 1 tab(s) orally once a day

## 2021-11-15 NOTE — PROGRESS NOTE ADULT - REASON FOR ADMISSION
Near Syncope

## 2021-11-15 NOTE — PROGRESS NOTE ADULT - SUBJECTIVE AND OBJECTIVE BOX
REASON FOR VISIT: AF    HPI:  67 y/o with a cardiac history of:    *HFpEF  *afib-persistent/permanent  *s/p CVA-per MRI study '17-"mild chronic microvascular changes"  *s/p watchman Apr '19-due to fall risk  *HTN    *Obesity-BMI=40  *VIJAY noncompliant w/ CPAP  *ETOHism  *recurrent falls s/p BKA  *COPD    He was admitted on 11/9/21 following a near-syncopal episode associated with tachycardia; found to have bilateral pneumonia + acute on chronic respiratory failure and with multidrug resistance organisms in pulmonary secretions.    11/10/21: Afib rate controlled. No chest pain  11/11/21:  Feels better; persistent headache/fatigue; no cardiac complaints.    MEDICATIONS  (STANDING):  aspirin enteric coated 81 milliGRAM(s) Oral daily  azithromycin   Tablet 500 milliGRAM(s) Oral daily  budesonide 160 MICROgram(s)/formoterol 4.5 MICROgram(s) Inhaler 2 Puff(s) Inhalation two times a day  busPIRone 10 milliGRAM(s) Oral two times a day    cefepime   IVPB 2000 milliGRAM(s) IV Intermittent every 12 hours  diltiazem    milliGRAM(s) Oral daily  doxazosin 2 milliGRAM(s) Oral at bedtime  enoxaparin Injectable 40 milliGRAM(s) SubCutaneous daily  folic acid 1 milliGRAM(s) Oral daily  gabapentin 400 milliGRAM(s) Oral three times a day  influenza   Vaccine 0.5 milliLiter(s) IntraMuscular once  methylPREDNISolone sodium succinate Injectable 40 milliGRAM(s) IV Push every 8 hours  metoprolol tartrate 25 milliGRAM(s) Oral two times a day  multivitamin 1 Tablet(s) Oral daily  pantoprazole    Tablet 40 milliGRAM(s) Oral before breakfast  polyethylene glycol 3350 17 Gram(s) Oral daily  QUEtiapine 100 milliGRAM(s) Oral at bedtime  saccharomyces boulardii 250 milliGRAM(s) Oral two times a day  senna 2 Tablet(s) Oral at bedtime  sodium chloride 0.9%. 1000 milliLiter(s) (50 mL/Hr) IV Continuous <Continuous>  thiamine 100 milliGRAM(s) Oral daily  tiotropium 18 MICROgram(s) Capsule 1 Capsule(s) Inhalation daily    MEDICATIONS  (PRN):  acetaminophen     Tablet .. 650 milliGRAM(s) Oral every 6 hours PRN Mild Pain (1 - 3)  ALBUTerol    90 MICROgram(s) HFA Inhaler 2 Puff(s) Inhalation every 6 hours PRN Shortness of Breath and/or Wheezing  LORazepam     Tablet 1 milliGRAM(s) Oral every 1 hour PRN CIWA-Ar score 8 or greater    Vital Signs Last 24 Hrs  T(C): 36.9 (10 Nov 2021 21:16), Max: 36.9 (10 Nov 2021 21:16)  T(F): 98.4 (10 Nov 2021 21:16), Max: 98.4 (10 Nov 2021 21:16)  HR: 75 (10 Nov 2021 21:55) (73 - 75)  BP: 112/50 (10 Nov 2021 21:16) (112/50 - 112/50)  RR: 20 (10 Nov 2021 21:16) (20 - 20)  SpO2: 98% (10 Nov 2021 21:55) (97% - 98%)    PHYSICAL EXAM:  Constitutional: NAD, awake and alert, obese  Respiratory: Breath sounds are clear bilaterally, No wheezing, rales or rhonchi  Cardiovascular: Irregular, normal rate  Gastrointestinal: Bowel Sounds present, central obesity, abdomen is soft, nontender.   Extremities: L BKA     LABS:                  11.5   11.38 )-----------( 107      ( 10 Nov 2021 07:22 )             33.9     136  |  101  |  24<H>  ----------------------------<  105<H>  3.4<L>   |  30  |  0.93    Ca    8.5      10 Nov 2021 07:22  Phos  2.5     11-10  Mg     2.0     11-10    TPro  7.5  /  Alb  3.2<L>  /  TBili  1.1  /  DBili  x   /  AST  20  /  ALT  18  /  AlkPhos  106  11-09    - TroponinI hsT: <-14.31, <-19.27    12 Lead ECG (11.09.21 @ 09:22):  Atrial fibrillation with rapid ventricular response  Anteroseptal infarct (cited on or before 17-AUG-2017)  Abnormal ECG    TTE Echo Complete w/o Contrast w/ Doppler (06.30.21 @ 14:49) >   Mild concentric left ventricular hypertrophy is present. Estimated left ventricular ejection fraction is 60-65 %.   The left atrium is severely dilated.   The right ventricle is not well seen, appears grossly normal.   Highly eccentric mitral regurgitation is noted.(? severity-study limited)   Moderate (2+) tricuspid valve regurgitation.   Moderate pulmonary hypertension.     Xray Chest 1 View- PORTABLE-Urgent (Xray Chest 1 View- PORTABLE-Urgent .) (11.09.21 @ 09:56):  Bilateral lung parenchymal opacities are noted, right greater than left, with appearance most suggestive for bilateral pneumonia.    NM Nuclear Stress Pharmacologic Multiple (10.19.21 @ 09:25):  SPECT Myocardial Perfusion Imaging demonstrates:  Dilated left ventricle.  No scan evidence of reversible or fixed perfusion defects.  Normal left ventricular contractility with an ejection fraction of 73% (Normal: 50% or greater).  No regional wall motion abnormalities.  
History of Present Illness:   65 yo Male with PMH of diastolic CHF (LVEF 55-60% 5/2020), a. fib s/p watchman device, obesity, COPD, HTN, neuropathy, s/p left BKA, h/o CVA, h/o trach s/p removal, h/o etoh abuse, h/o pneumothorax, h/o cardiac arrest presents to ED for a near syncopeal episode this morning where his legs gave out;  Did not lose consciousness. landed on his back onto his head, wasn't able to get up afterwards, was lifted up with assistance from other people. He states that he had a few drinks last night; Patient denies any Chest pain; does have shortness of breath with exertion that is chronic as per the patient.     EMS noted pt had elevated HR up to 170s, was given amiodarone in route.  In the ER, patient was AFIB with RVR.  PAtient was given IV cardizem 10mg IV X 2. He was also given Rocephin and zithromax   -found to have Pna and rapid afib    11.11: still congested, deep productive coughing,   tele Afib 90/min  11.13: less cough, less congestion, less dyspnea   11.14: feels better, no cough, minimal dyspnea         REVIEW OF SYSTEMS:    CONSTITUTIONAL: No weakness, No fevers or chills  ENT: No ear ache, No sorethroat  NECK: No pain, No stiffness  RESPIRATORY: + cough, + wheezing, No hemoptysis; + dyspnea  CARDIOVASCULAR: No chest pain, No palpitations  GASTROINTESTINAL: No abd pain, No nausea, No vomiting, No hematemesis, No diarrhea or constipation. No melena, No hematochezia.  GENITOURINARY: No dysuria, No  hematuria  NEUROLOGICAL: No diplopia, No paresthesia, No motor dysfunction  MUSCULOSKELETAL: No arthralgia, No myalgia  SKIN: No rashes, or lesions   PSYCH: no anxiety, no suicidal ideation    All other review of systems is negative unless indicated above    Vital Signs Last 24 Hrs  T(C): 36.6 (14 Nov 2021 07:05), Max: 36.6 (14 Nov 2021 07:05)  T(F): 97.8 (14 Nov 2021 07:05), Max: 97.8 (14 Nov 2021 07:05)  HR: 87 (14 Nov 2021 07:05) (87 - 94)  BP: 122/61 (14 Nov 2021 07:05) (122/61 - 122/72)  RR: 19 (14 Nov 2021 07:05) (19 - 19)  SpO2: 96% (14 Nov 2021 10:09) (93% - 96%)    PHYSICAL EXAM:    GENERAL: NAD, obese   HEENT:  NC/AT, EOMI, PERRLA, No scleral icterus, Moist mucous membranes  NECK: Supple, No JVD  CNS:  Alert & Oriented X3, Motor Strength 5/5 B/L upper and lower extremities; DTRs 2+ intact   LUNG: bilat wheezing and crackles   HEART: RRR; No murmurs, No rubs  ABDOMEN: +BS, ST/ND/NT  GENITOURINARY: Voiding, Bladder not distended  EXTREMITIES:  2+ Peripheral Pulses, No clubbing, No cyanosis, No tibial edema  MUSCULOSKELTAL: Joints normal ROM, No TTP, No effusion  SKIN: no rashes  RECTAL: deferred, not indicated  BREAST: deferred               Labs:                        12.2   9.82  )-----------( 133      ( 13 Nov 2021 06:45 )             36.9     11-13    140  |  107  |  27<H>  ----------------------------<  147<H>  4.3   |  29  |  0.80    Ca    9.3      13 Nov 2021 06:45      MEDICATIONS  (STANDING):  aspirin enteric coated 81 milliGRAM(s) Oral daily  budesonide 160 MICROgram(s)/formoterol 4.5 MICROgram(s) Inhaler 2 Puff(s) Inhalation two times a day  busPIRone 10 milliGRAM(s) Oral two times a day  cefepime   IVPB 2000 milliGRAM(s) IV Intermittent every 12 hours  diltiazem    milliGRAM(s) Oral daily  doxazosin 2 milliGRAM(s) Oral at bedtime  enoxaparin Injectable 40 milliGRAM(s) SubCutaneous daily  folic acid 1 milliGRAM(s) Oral daily  furosemide    Tablet 80 milliGRAM(s) Oral two times a day  gabapentin 400 milliGRAM(s) Oral three times a day  methylPREDNISolone sodium succinate Injectable 40 milliGRAM(s) IV Push every 12 hours  metoprolol tartrate 25 milliGRAM(s) Oral two times a day  multivitamin 1 Tablet(s) Oral daily  pantoprazole    Tablet 40 milliGRAM(s) Oral before breakfast  polyethylene glycol 3350 17 Gram(s) Oral daily  QUEtiapine 100 milliGRAM(s) Oral at bedtime  saccharomyces boulardii 250 milliGRAM(s) Oral two times a day  senna 2 Tablet(s) Oral at bedtime  spironolactone 25 milliGRAM(s) Oral daily  thiamine 100 milliGRAM(s) Oral daily  tiotropium 18 MICROgram(s) Capsule 1 Capsule(s) Inhalation daily        all labs reviewed  all imaging reviewed    a/p:    1. Pna: suspect Gram Neg Mg etiology  h/o Pseudomonas Pna in the past   Cefepime day #6, Zithromax day#5  Blood Cx: ngtd  sputum Cx    2. AECopd:  Steroids IV: Solumedrol 40mg q12, will change to Prednisone 40mg/day and taper as outpatient   Albuterol Prn  Mucinex     3. Afib: controlled  c/w Cardizem CD, Lopressor   cw ASA    4. HFpEF:  c/w Lasix / Aldactone resumed 
History of Present Illness:   67 yo Male with PMH of diastolic CHF (LVEF 55-60% 5/2020), a. fib s/p watchman device, obesity, COPD, HTN, neuropathy, s/p left BKA, h/o CVA, h/o trach s/p removal, h/o etoh abuse, h/o pneumothorax, h/o cardiac arrest presents to ED for a near syncopeal episode this morning where his legs gave out;  Did not lose consciousness. landed on his back onto his head, wasn't able to get up afterwards, was lifted up with assistance from other people. He states that he had a few drinks last night; Patient denies any Chest pain; does have shortness of breath with exertion that is chronic as per the patient.     EMS noted pt had elevated HR up to 170s, was given amiodarone in route.  In the ER, patient was AFIB with RVR.  PAtient was given IV cardizem 10mg IV X 2. He was also given Rocephin and zithromax   -found to have Pna and rapid afib    11.11: still congested, deep productive coughing,   tele Afib 90/min  11.13: less cough, less congestion, less dyspnea           REVIEW OF SYSTEMS:    CONSTITUTIONAL: No weakness, No fevers or chills  ENT: No ear ache, No sorethroat  NECK: No pain, No stiffness  RESPIRATORY: + cough, + wheezing, No hemoptysis; + dyspnea  CARDIOVASCULAR: No chest pain, No palpitations  GASTROINTESTINAL: No abd pain, No nausea, No vomiting, No hematemesis, No diarrhea or constipation. No melena, No hematochezia.  GENITOURINARY: No dysuria, No  hematuria  NEUROLOGICAL: No diplopia, No paresthesia, No motor dysfunction  MUSCULOSKELETAL: No arthralgia, No myalgia  SKIN: No rashes, or lesions   PSYCH: no anxiety, no suicidal ideation    All other review of systems is negative unless indicated above    Vital Signs Last 24 Hrs  T(C): 36.4 (13 Nov 2021 07:20), Max: 36.4 (13 Nov 2021 07:20)  T(F): 97.5 (13 Nov 2021 07:20), Max: 97.5 (13 Nov 2021 07:20)  HR: 88 (13 Nov 2021 09:14) (79 - 95)  BP: 115/62 (13 Nov 2021 07:20) (115/62 - 144/73)  RR: 18 (13 Nov 2021 07:20) (18 - 18)  SpO2: 97% (13 Nov 2021 07:20) (97% - 97%)    PHYSICAL EXAM:    GENERAL: NAD, obese   HEENT:  NC/AT, EOMI, PERRLA, No scleral icterus, Moist mucous membranes  NECK: Supple, No JVD  CNS:  Alert & Oriented X3, Motor Strength 5/5 B/L upper and lower extremities; DTRs 2+ intact   LUNG: bilat wheezing and crackles   HEART: RRR; No murmurs, No rubs  ABDOMEN: +BS, ST/ND/NT  GENITOURINARY: Voiding, Bladder not distended  EXTREMITIES:  2+ Peripheral Pulses, No clubbing, No cyanosis, No tibial edema  MUSCULOSKELTAL: Joints normal ROM, No TTP, No effusion  SKIN: no rashes  RECTAL: deferred, not indicated  BREAST: deferred               Labs:                        12.2   9.82  )-----------( 133      ( 13 Nov 2021 06:45 )             36.9     11-13    140  |  107  |  27<H>  ----------------------------<  147<H>  4.3   |  29  |  0.80    Ca    9.3      13 Nov 2021 06:45      MEDICATIONS  (STANDING):  aspirin enteric coated 81 milliGRAM(s) Oral daily  budesonide 160 MICROgram(s)/formoterol 4.5 MICROgram(s) Inhaler 2 Puff(s) Inhalation two times a day  busPIRone 10 milliGRAM(s) Oral two times a day  cefepime   IVPB 2000 milliGRAM(s) IV Intermittent every 12 hours  diltiazem    milliGRAM(s) Oral daily  doxazosin 2 milliGRAM(s) Oral at bedtime  enoxaparin Injectable 40 milliGRAM(s) SubCutaneous daily  folic acid 1 milliGRAM(s) Oral daily  gabapentin 400 milliGRAM(s) Oral three times a day  influenza   Vaccine 0.5 milliLiter(s) IntraMuscular once  methylPREDNISolone sodium succinate Injectable 40 milliGRAM(s) IV Push every 12 hours  metoprolol tartrate 25 milliGRAM(s) Oral two times a day  multivitamin 1 Tablet(s) Oral daily  pantoprazole    Tablet 40 milliGRAM(s) Oral before breakfast  polyethylene glycol 3350 17 Gram(s) Oral daily  QUEtiapine 100 milliGRAM(s) Oral at bedtime  saccharomyces boulardii 250 milliGRAM(s) Oral two times a day  senna 2 Tablet(s) Oral at bedtime  spironolactone 25 milliGRAM(s) Oral daily  thiamine 100 milliGRAM(s) Oral daily  tiotropium 18 MICROgram(s) Capsule 1 Capsule(s) Inhalation daily      all labs reviewed  all imaging reviewed    a/p:    1. Pna: suspect Gram Neg Mg etiology  h/o Pseudomonas Pna in the past   Cefepime/Zithromax day#5  Blood Cx: ngtd  sputum Cx    2. AECopd:  Steroids IV: Solumedrol 40mg q12  Albuterol Prn  Mucinex     3. Afib: controlled  c/w Cardizem CD, Lopressor   cw ASA    4. HFpEF:  c/w Lasix / Aldactone
Date of service: 11-15-21 @ 12:49    Sitting in bed in NAD  Has dry cough  SOB is improved  No sputum production today    ROS: no fever or chills; denies dizziness, no HA, no abdominal pain, no diarrhea or constipation; no dysuria, no legs pain, no rashes    MEDICATIONS  (STANDING):  aspirin enteric coated 81 milliGRAM(s) Oral daily  budesonide 160 MICROgram(s)/formoterol 4.5 MICROgram(s) Inhaler 2 Puff(s) Inhalation two times a day  busPIRone 10 milliGRAM(s) Oral two times a day  cefepime   IVPB      cefepime   IVPB 2000 milliGRAM(s) IV Intermittent every 12 hours  diltiazem    milliGRAM(s) Oral daily  doxazosin 2 milliGRAM(s) Oral at bedtime  enoxaparin Injectable 40 milliGRAM(s) SubCutaneous daily  folic acid 1 milliGRAM(s) Oral daily  furosemide    Tablet 80 milliGRAM(s) Oral two times a day  gabapentin 400 milliGRAM(s) Oral three times a day  metoprolol tartrate 25 milliGRAM(s) Oral two times a day  multivitamin 1 Tablet(s) Oral daily  pantoprazole    Tablet 40 milliGRAM(s) Oral before breakfast  polyethylene glycol 3350 17 Gram(s) Oral daily  predniSONE   Tablet 40 milliGRAM(s) Oral daily  QUEtiapine 100 milliGRAM(s) Oral at bedtime  saccharomyces boulardii 250 milliGRAM(s) Oral two times a day  senna 2 Tablet(s) Oral at bedtime  spironolactone 25 milliGRAM(s) Oral daily  thiamine 100 milliGRAM(s) Oral daily  tiotropium 18 MICROgram(s) Capsule 1 Capsule(s) Inhalation daily    Vital Signs Last 24 Hrs  T(C): 36.4 (15 Nov 2021 07:56), Max: 36.4 (15 Nov 2021 07:56)  T(F): 97.6 (15 Nov 2021 07:56), Max: 97.6 (15 Nov 2021 07:56)  HR: 70 (15 Nov 2021 07:56) (70 - 96)  BP: 124/61 (15 Nov 2021 07:56) (124/61 - 135/56)  BP(mean): --  RR: 18 (15 Nov 2021 07:56) (18 - 19)  SpO2: 95% (15 Nov 2021 07:56) (93% - 95%)     Physical exam:    Constitutional:  No acute distress  HEENT: NC/AT, EOMI, PERRLA, conjunctivae clear; ears and nose atraumatic  Neck: supple; thyroid not palpable  Back: no tenderness  Respiratory: respiratory effort normal; few crackles at bases  Cardiovascular: S1S2 regular, no murmurs  Abdomen: soft, not tender, not distended, positive BS  Genitourinary: no suprapubic tenderness  Lymphatic: no LN palpable  Musculoskeletal: no muscle tenderness, no joint swelling or tenderness  Extremities: no pedal edema  Neurological/ Psychiatric: AxOx3, judgement and insight normal; moving all extremities  Skin: no rashes; no palpable lesions    Labs: reviewed                        11.8   11.43 )-----------( 154      ( 2021 06:35 )             36.6     11-12    138  |  105  |  18  ----------------------------<  169<H>  3.9   |  27  |  0.86    Ca    9.5      2021 06:35                        11.5   11.38 )-----------( 107      ( 10 Nov 2021 07:22 )             33.9     11-10    136  |  101  |  24<H>  ----------------------------<  105<H>  3.4<L>   |  30  |  0.93    Ca    8.5      10 Nov 2021 07:22  Phos  2.5     11-10  Mg     2.0     -10    TPro  7.5  /  Alb  3.2<L>  /  TBili  1.1  /  DBili  x   /  AST  20  /  ALT  18  /  AlkPhos  106  11-09     LIVER FUNCTIONS - ( 2021 09:24 )  Alb: 3.2 g/dL / Pro: 7.5 gm/dL / ALK PHOS: 106 U/L / ALT: 18 U/L / AST: 20 U/L / GGT: x           Urinalysis Basic - ( 2021 16:40 )    Color: Yellow / Appearance: Clear / S.015 / pH: x  Gluc: x / Ketone: Negative  / Bili: Negative / Urobili: Negative mg/dL   Blood: x / Protein: 30 mg/dL / Nitrite: Negative   Leuk Esterase: Negative / RBC: 0-2 /HPF / WBC Negative   Sq Epi: x / Non Sq Epi: Occasional / Bacteria: Negative    COVID-19 PCR: NotDetec (21 @ 09:24)      Culture - Sputum (collected 2021 17:20)  Source: .Sputum Sputum  Gram Stain (2021 06:29):    Few polymorphonuclear leukocytes per low power field    Few Squamous epithelial cells per low power field    Rare Yeast like cells seen per oil power field    Rare Gram Variable Cocci seen per oil power field  Final Report (2021 18:43):    Normal Respiratory Faith present    Culture - Urine (collected 2021 16:40)  Source: Clean Catch None  Final Report (10 Nov 2021 21:53):    <10,000 CFU/mL Normal Urogenital Faith    Culture - Blood (collected 2021 13:10)  Source: .Blood None  Final Report (2021 17:00):    No Growth Final    Culture - Blood (collected 2021 13:07)  Source: .Blood None  Final Report (2021 17:00):    No Growth Final        Radiology: all available radiological tests reviewed    < from: Xray Chest 1 View- PORTABLE-Urgent (Xray Chest 1 View- PORTABLE-Urgent .) (21 @ 09:56) >  IMPRESSION: Bilateral lung parenchymal opacities are noted, right greater than left, with appearance most suggestive for bilateral pneumonia.  < end of copied text >    Advanced directives addressed: full resuscitation
History of Present Illness:   67 yo Male with PMH of diastolic CHF (LVEF 55-60% 5/2020), a. fib s/p watchman device, obesity, COPD, HTN, neuropathy, s/p left BKA, h/o CVA, h/o trach s/p removal, h/o etoh abuse, h/o pneumothorax, h/o cardiac arrest presents to ED for a near syncopeal episode this morning where his legs gave out;  Did not lose consciousness. landed on his back onto his head, wasn't able to get up afterwards, was lifted up with assistance from other people. He states that he had a few drinks last night; Patient denies any Chest pain; does have shortness of breath with exertion that is chronic as per the patient.     EMS noted pt had elevated HR up to 170s, was given amiodarone in route.  In the ER, patient was AFIB with RVR.  PAtient was given IV cardizem 10mg IV X 2. He was also given Rocephin and zithromax   -found to have Pna and rapid afib          REVIEW OF SYSTEMS:    CONSTITUTIONAL: No weakness, No fevers or chills  ENT: No ear ache, No sorethroat  NECK: No pain, No stiffness  RESPIRATORY: No cough, No wheezing, No hemoptysis; No dyspnea  CARDIOVASCULAR: No chest pain, No palpitations  GASTROINTESTINAL: No abd pain, No nausea, No vomiting, No hematemesis, No diarrhea or constipation. No melena, No hematochezia.  GENITOURINARY: No dysuria, No  hematuria  NEUROLOGICAL: No diplopia, No paresthesia, No motor dysfunction  MUSCULOSKELETAL: No arthralgia, No myalgia  SKIN: No rashes, or lesions   PSYCH: no anxiety, no suicidal ideation    All other review of systems is negative unless indicated above    Vital Signs Last 24 Hrs  T(C): 36.6 (10 Nov 2021 07:55), Max: 36.8 (09 Nov 2021 16:50)  T(F): 97.8 (10 Nov 2021 07:55), Max: 98.3 (09 Nov 2021 19:36)  HR: 82 (10 Nov 2021 07:55) (82 - 115)  BP: 112/58 (10 Nov 2021 07:55) (105/58 - 112/66)  BP(mean): 67 (09 Nov 2021 17:44) (67 - 78)  RR: 22 (10 Nov 2021 07:55) (11 - 24)  SpO2: 92% (10 Nov 2021 07:55) (92% - 96%)    PHYSICAL EXAM:    GENERAL: NAD, obese   HEENT:  NC/AT, EOMI, PERRLA, No scleral icterus, Moist mucous membranes  NECK: Supple, No JVD  CNS:  Alert & Oriented X3, Motor Strength 5/5 B/L upper and lower extremities; DTRs 2+ intact   LUNG: bilat wheezing and crackles   HEART: RRR; No murmurs, No rubs  ABDOMEN: +BS, ST/ND/NT  GENITOURINARY: Voiding, Bladder not distended  EXTREMITIES:  2+ Peripheral Pulses, No clubbing, No cyanosis, No tibial edema  MUSCULOSKELTAL: Joints normal ROM, No TTP, No effusion  VAGINAL: deferred  SKIN: no rashes  RECTAL: deferred, not indicated  BREAST: deferred                          11.5   11.38 )-----------( 107      ( 10 Nov 2021 07:22 )             33.9     11-10    136  |  101  |  24<H>  ----------------------------<  105<H>  3.4<L>   |  30  |  0.93    Ca    8.5      10 Nov 2021 07:22  Phos  2.5     11-10  Mg     2.0     11-10    TPro  7.5  /  Alb  3.2<L>  /  TBili  1.1  /  DBili  x   /  AST  20  /  ALT  18  /  AlkPhos  106  11-09    Vancomycin levels:   Cultures:     MEDICATIONS  (STANDING):  aspirin enteric coated 81 milliGRAM(s) Oral daily  azithromycin   Tablet 500 milliGRAM(s) Oral daily  budesonide 160 MICROgram(s)/formoterol 4.5 MICROgram(s) Inhaler 2 Puff(s) Inhalation two times a day  busPIRone 10 milliGRAM(s) Oral two times a day  cefepime   IVPB 2000 milliGRAM(s) IV Intermittent every 12 hours  diltiazem    milliGRAM(s) Oral daily  doxazosin 2 milliGRAM(s) Oral at bedtime  enoxaparin Injectable 40 milliGRAM(s) SubCutaneous daily  folic acid 1 milliGRAM(s) Oral daily  gabapentin 400 milliGRAM(s) Oral three times a day  influenza   Vaccine 0.5 milliLiter(s) IntraMuscular once  metoprolol tartrate 25 milliGRAM(s) Oral two times a day  multivitamin 1 Tablet(s) Oral daily  pantoprazole    Tablet 40 milliGRAM(s) Oral before breakfast  polyethylene glycol 3350 17 Gram(s) Oral daily  QUEtiapine 100 milliGRAM(s) Oral at bedtime  saccharomyces boulardii 250 milliGRAM(s) Oral two times a day  senna 2 Tablet(s) Oral at bedtime  sodium chloride 0.9%. 1000 milliLiter(s) (50 mL/Hr) IV Continuous <Continuous>  thiamine 100 milliGRAM(s) Oral daily  tiotropium 18 MICROgram(s) Capsule 1 Capsule(s) Inhalation daily    MEDICATIONS  (PRN):  acetaminophen     Tablet .. 650 milliGRAM(s) Oral every 6 hours PRN Mild Pain (1 - 3)  ALBUTerol    90 MICROgram(s) HFA Inhaler 2 Puff(s) Inhalation every 6 hours PRN Shortness of Breath and/or Wheezing  LORazepam     Tablet 1 milliGRAM(s) Oral every 1 hour PRN CIWA-Ar score 8 or greater      all labs reviewed  all imaging reviewed    a/p:    1. Pna: suspect Gram Neg Mg etiology  h/o Pseudomonas   Cefepime/Zithromax   Blood Cx: ngtd  sputum Cx    2. AECopd:  Steroids IV  Albuterol Prn    3. Afib: controlled  c/w Cardizem CD, Lopressor 
History of Present Illness:   67 yo Male with PMH of diastolic CHF (LVEF 55-60% 5/2020), a. fib s/p watchman device, obesity, COPD, HTN, neuropathy, s/p left BKA, h/o CVA, h/o trach s/p removal, h/o etoh abuse, h/o pneumothorax, h/o cardiac arrest presents to ED for a near syncopeal episode this morning where his legs gave out;  Did not lose consciousness. landed on his back onto his head, wasn't able to get up afterwards, was lifted up with assistance from other people. He states that he had a few drinks last night; Patient denies any Chest pain; does have shortness of breath with exertion that is chronic as per the patient.     EMS noted pt had elevated HR up to 170s, was given amiodarone in route.  In the ER, patient was AFIB with RVR.  PAtient was given IV cardizem 10mg IV X 2. He was also given Rocephin and zithromax   -found to have Pna and rapid afib    11.11: still congested, deep productive coughing,   tele Afib 90/min          REVIEW OF SYSTEMS:    CONSTITUTIONAL: No weakness, No fevers or chills  ENT: No ear ache, No sorethroat  NECK: No pain, No stiffness  RESPIRATORY: + cough, + wheezing, No hemoptysis; + dyspnea  CARDIOVASCULAR: No chest pain, No palpitations  GASTROINTESTINAL: No abd pain, No nausea, No vomiting, No hematemesis, No diarrhea or constipation. No melena, No hematochezia.  GENITOURINARY: No dysuria, No  hematuria  NEUROLOGICAL: No diplopia, No paresthesia, No motor dysfunction  MUSCULOSKELETAL: No arthralgia, No myalgia  SKIN: No rashes, or lesions   PSYCH: no anxiety, no suicidal ideation    All other review of systems is negative unless indicated above    Vital Signs Last 24 Hrs  T(C): 36.5 (11 Nov 2021 09:00), Max: 36.9 (10 Nov 2021 21:16)  T(F): 97.7 (11 Nov 2021 09:00), Max: 98.4 (10 Nov 2021 21:16)  HR: 89 (11 Nov 2021 09:00) (73 - 89)  BP: 124/65 (11 Nov 2021 09:00) (112/50 - 124/65)  RR: 20 (11 Nov 2021 09:00) (20 - 20)  SpO2: 94% (11 Nov 2021 09:00) (94% - 98%)    PHYSICAL EXAM:    GENERAL: NAD, obese   HEENT:  NC/AT, EOMI, PERRLA, No scleral icterus, Moist mucous membranes  NECK: Supple, No JVD  CNS:  Alert & Oriented X3, Motor Strength 5/5 B/L upper and lower extremities; DTRs 2+ intact   LUNG: bilat wheezing and crackles   HEART: RRR; No murmurs, No rubs  ABDOMEN: +BS, ST/ND/NT  GENITOURINARY: Voiding, Bladder not distended  EXTREMITIES:  2+ Peripheral Pulses, No clubbing, No cyanosis, No tibial edema  MUSCULOSKELTAL: Joints normal ROM, No TTP, No effusion  SKIN: no rashes  RECTAL: deferred, not indicated  BREAST: deferred                          11.5   11.38 )-----------( 107      ( 10 Nov 2021 07:22 )             33.9     11-10    136  |  101  |  24<H>  ----------------------------<  105<H>  3.4<L>   |  30  |  0.93    Ca    8.5      10 Nov 2021 07:22  Phos  2.5     11-10  Mg     2.0     11-10    TPro  7.5  /  Alb  3.2<L>  /  TBili  1.1  /  DBili  x   /  AST  20  /  ALT  18  /  AlkPhos  106  11-09    Vancomycin levels:   Cultures:     MEDICATIONS  (STANDING):  aspirin enteric coated 81 milliGRAM(s) Oral daily  azithromycin   Tablet 500 milliGRAM(s) Oral daily  budesonide 160 MICROgram(s)/formoterol 4.5 MICROgram(s) Inhaler 2 Puff(s) Inhalation two times a day  busPIRone 10 milliGRAM(s) Oral two times a day  cefepime   IVPB 2000 milliGRAM(s) IV Intermittent every 12 hours  diltiazem    milliGRAM(s) Oral daily  doxazosin 2 milliGRAM(s) Oral at bedtime  enoxaparin Injectable 40 milliGRAM(s) SubCutaneous daily  folic acid 1 milliGRAM(s) Oral daily  gabapentin 400 milliGRAM(s) Oral three times a day  influenza   Vaccine 0.5 milliLiter(s) IntraMuscular once  metoprolol tartrate 25 milliGRAM(s) Oral two times a day  multivitamin 1 Tablet(s) Oral daily  pantoprazole    Tablet 40 milliGRAM(s) Oral before breakfast  polyethylene glycol 3350 17 Gram(s) Oral daily  QUEtiapine 100 milliGRAM(s) Oral at bedtime  saccharomyces boulardii 250 milliGRAM(s) Oral two times a day  senna 2 Tablet(s) Oral at bedtime  sodium chloride 0.9%. 1000 milliLiter(s) (50 mL/Hr) IV Continuous <Continuous>  thiamine 100 milliGRAM(s) Oral daily  tiotropium 18 MICROgram(s) Capsule 1 Capsule(s) Inhalation daily    MEDICATIONS  (PRN):  acetaminophen     Tablet .. 650 milliGRAM(s) Oral every 6 hours PRN Mild Pain (1 - 3)  ALBUTerol    90 MICROgram(s) HFA Inhaler 2 Puff(s) Inhalation every 6 hours PRN Shortness of Breath and/or Wheezing  LORazepam     Tablet 1 milliGRAM(s) Oral every 1 hour PRN CIWA-Ar score 8 or greater      all labs reviewed  all imaging reviewed    a/p:    1. Pna: suspect Gram Neg Mg etiology  h/o Pseudomonas Pna in the past   Cefepime/Zithromax day#3  Blood Cx: ngtd  sputum Cx    2. AECopd:  Steroids IV  Albuterol Prn    3. Afib: controlled  c/w Cardizem CD, Lopressor   cw ASA
Date of service: 21 @ 10:24    Sitting in bed  Has cough with scant sputum  SOB with light exrcise    ROS: no fever or chills; denies dizziness, no HA, no abdominal pain, no diarrhea or constipation; no dysuria, no legs pain, no rashes    MEDICATIONS  (STANDING):  aspirin enteric coated 81 milliGRAM(s) Oral daily  azithromycin   Tablet 500 milliGRAM(s) Oral daily  budesonide 160 MICROgram(s)/formoterol 4.5 MICROgram(s) Inhaler 2 Puff(s) Inhalation two times a day  busPIRone 10 milliGRAM(s) Oral two times a day  cefepime   IVPB      cefepime   IVPB 2000 milliGRAM(s) IV Intermittent every 12 hours  diltiazem    milliGRAM(s) Oral daily  doxazosin 2 milliGRAM(s) Oral at bedtime  enoxaparin Injectable 40 milliGRAM(s) SubCutaneous daily  folic acid 1 milliGRAM(s) Oral daily  gabapentin 400 milliGRAM(s) Oral three times a day  influenza   Vaccine 0.5 milliLiter(s) IntraMuscular once  methylPREDNISolone sodium succinate Injectable 40 milliGRAM(s) IV Push every 8 hours  metoprolol tartrate 25 milliGRAM(s) Oral two times a day  multivitamin 1 Tablet(s) Oral daily  pantoprazole    Tablet 40 milliGRAM(s) Oral before breakfast  polyethylene glycol 3350 17 Gram(s) Oral daily  QUEtiapine 100 milliGRAM(s) Oral at bedtime  saccharomyces boulardii 250 milliGRAM(s) Oral two times a day  senna 2 Tablet(s) Oral at bedtime  sodium chloride 0.9%. 1000 milliLiter(s) (50 mL/Hr) IV Continuous <Continuous>  thiamine 100 milliGRAM(s) Oral daily  tiotropium 18 MICROgram(s) Capsule 1 Capsule(s) Inhalation daily    Vital Signs Last 24 Hrs  T(C): 36.5 (2021 09:00), Max: 36.9 (10 Nov 2021 21:16)  T(F): 97.7 (2021 09:00), Max: 98.4 (10 Nov 2021 21:16)  HR: 89 (2021 09:00) (73 - 89)  BP: 124/65 (2021 09:00) (112/50 - 124/65)  BP(mean): --  RR: 20 (2021 09:00) (20 - 20)  SpO2: 94% (2021 09:00) (94% - 98%)     Physical exam:    Constitutional:  No acute distress  HEENT: NC/AT, EOMI, PERRLA, conjunctivae clear; ears and nose atraumatic  Neck: supple; thyroid not palpable  Back: no tenderness  Respiratory: respiratory effort normal; few crackles at bases  Cardiovascular: S1S2 regular, no murmurs  Abdomen: soft, not tender, not distended, positive BS  Genitourinary: no suprapubic tenderness  Lymphatic: no LN palpable  Musculoskeletal: no muscle tenderness, no joint swelling or tenderness  Extremities: no pedal edema  Neurological/ Psychiatric: AxOx3, judgement and insight normal; moving all extremities  Skin: no rashes; no palpable lesions    Labs: reviewed                           11 )-----------( 107      ( 10 Nov 2021 07:22 )             33.9     -10    136  |  101  |  24<H>  ----------------------------<  105<H>  3.4<L>   |  30  |  0.93    Ca    8.5      10 Nov 2021 07:22  Phos  2.5     11-10  Mg     2.0     11-10    TPro  7.5  /  Alb  3.2<L>  /  TBili  1.1  /  DBili  x   /  AST  20  /  ALT  18  /  AlkPhos  106  11-09     LIVER FUNCTIONS - ( 2021 09:24 )  Alb: 3.2 g/dL / Pro: 7.5 gm/dL / ALK PHOS: 106 U/L / ALT: 18 U/L / AST: 20 U/L / GGT: x           Urinalysis Basic - ( 2021 16:40 )    Color: Yellow / Appearance: Clear / S.015 / pH: x  Gluc: x / Ketone: Negative  / Bili: Negative / Urobili: Negative mg/dL   Blood: x / Protein: 30 mg/dL / Nitrite: Negative   Leuk Esterase: Negative / RBC: 0-2 /HPF / WBC Negative   Sq Epi: x / Non Sq Epi: Occasional / Bacteria: Negative    COVID-19 PCR: NotDetec (21 @ 09:24)      Culture - Urine (collected 2021 16:40)  Source: Clean Catch None  Final Report (10 Nov 2021 21:53):    <10,000 CFU/mL Normal Urogenital Faith    Culture - Blood (collected 2021 13:10)  Source: .Blood None  Preliminary Report (10 Nov 2021 17:02):    No growth to date.    Culture - Blood (collected 2021 13:07)  Source: .Blood None  Preliminary Report (10 Nov 2021 17:02):    No growth to date.    Radiology: all available radiological tests reviewed    < from: Xray Chest 1 View- PORTABLE-Urgent (Xray Chest 1 View- PORTABLE-Urgent .) (21 @ 09:56) >  IMPRESSION: Bilateral lung parenchymal opacities are noted, right greater than left, with appearance most suggestive for bilateral pneumonia.  < end of copied text >    Advanced directives addressed: full resuscitation
Date of service: 21 @ 12:17    Lying in bed in NAD  Has cough with scant sputum production  SOB with light exercise  No fever    ROS: no fever or chills; denies dizziness, no HA, no abdominal pain, no diarrhea or constipation; no dysuria, no legs pain, no rashes    MEDICATIONS  (STANDING):  aspirin enteric coated 81 milliGRAM(s) Oral daily  budesonide 160 MICROgram(s)/formoterol 4.5 MICROgram(s) Inhaler 2 Puff(s) Inhalation two times a day  busPIRone 10 milliGRAM(s) Oral two times a day  cefepime   IVPB 2000 milliGRAM(s) IV Intermittent every 12 hours  cefepime   IVPB      diltiazem    milliGRAM(s) Oral daily  doxazosin 2 milliGRAM(s) Oral at bedtime  enoxaparin Injectable 40 milliGRAM(s) SubCutaneous daily  folic acid 1 milliGRAM(s) Oral daily  gabapentin 400 milliGRAM(s) Oral three times a day  influenza   Vaccine 0.5 milliLiter(s) IntraMuscular once  metoprolol tartrate 25 milliGRAM(s) Oral two times a day  multivitamin 1 Tablet(s) Oral daily  pantoprazole    Tablet 40 milliGRAM(s) Oral before breakfast  polyethylene glycol 3350 17 Gram(s) Oral daily  QUEtiapine 100 milliGRAM(s) Oral at bedtime  saccharomyces boulardii 250 milliGRAM(s) Oral two times a day  senna 2 Tablet(s) Oral at bedtime  spironolactone 25 milliGRAM(s) Oral daily  thiamine 100 milliGRAM(s) Oral daily  tiotropium 18 MICROgram(s) Capsule 1 Capsule(s) Inhalation daily    Vital Signs Last 24 Hrs  T(C): 36.4 (2021 08:48), Max: 36.6 (2021 19:58)  T(F): 97.5 (2021 08:48), Max: 97.9 (2021 19:58)  HR: 88 (2021 09:10) (86 - 96)  BP: 121/57 (2021 08:48) (114/56 - 143/72)  BP(mean): 88 (2021 19:58) (88 - 88)  RR: 18 (2021 08:48) (18 - 19)  SpO2: 96% (2021 08:48) (96% - 96%)     Physical exam:    Constitutional:  No acute distress  HEENT: NC/AT, EOMI, PERRLA, conjunctivae clear; ears and nose atraumatic  Neck: supple; thyroid not palpable  Back: no tenderness  Respiratory: respiratory effort normal; few crackles at bases  Cardiovascular: S1S2 regular, no murmurs  Abdomen: soft, not tender, not distended, positive BS  Genitourinary: no suprapubic tenderness  Lymphatic: no LN palpable  Musculoskeletal: no muscle tenderness, no joint swelling or tenderness  Extremities: no pedal edema  Neurological/ Psychiatric: AxOx3, judgement and insight normal; moving all extremities  Skin: no rashes; no palpable lesions    Labs: reviewed                        11.8   11.43 )-----------( 154      ( 2021 06:35 )             36.6     11-12    138  |  105  |  18  ----------------------------<  169<H>  3.9   |  27  |  0.86    Ca    9.5      2021 06:35                        11.5   11.38 )-----------( 107      ( 10 Nov 2021 07:22 )             33.9     11-10    136  |  101  |  24<H>  ----------------------------<  105<H>  3.4<L>   |  30  |  0.93    Ca    8.5      10 Nov 2021 07:22  Phos  2.5     11-10  Mg     2.0     11-10    TPro  7.5  /  Alb  3.2<L>  /  TBili  1.1  /  DBili  x   /  AST  20  /  ALT  18  /  AlkPhos  106  11-09     LIVER FUNCTIONS - ( 2021 09:24 )  Alb: 3.2 g/dL / Pro: 7.5 gm/dL / ALK PHOS: 106 U/L / ALT: 18 U/L / AST: 20 U/L / GGT: x           Urinalysis Basic - ( 2021 16:40 )    Color: Yellow / Appearance: Clear / S.015 / pH: x  Gluc: x / Ketone: Negative  / Bili: Negative / Urobili: Negative mg/dL   Blood: x / Protein: 30 mg/dL / Nitrite: Negative   Leuk Esterase: Negative / RBC: 0-2 /HPF / WBC Negative   Sq Epi: x / Non Sq Epi: Occasional / Bacteria: Negative    COVID-19 PCR: NotDetec (21 @ 09:24)      Culture - Urine (collected 2021 16:40)  Source: Clean Catch None  Final Report (10 Nov 2021 21:53):    <10,000 CFU/mL Normal Urogenital Faith    Culture - Blood (collected 2021 13:10)  Source: .Blood None  Preliminary Report (10 Nov 2021 17:02):    No growth to date.    Culture - Blood (collected 2021 13:07)  Source: .Blood None  Preliminary Report (10 Nov 2021 17:02):    No growth to date.    Radiology: all available radiological tests reviewed    < from: Xray Chest 1 View- PORTABLE-Urgent (Xray Chest 1 View- PORTABLE-Urgent .) (21 @ 09:56) >  IMPRESSION: Bilateral lung parenchymal opacities are noted, right greater than left, with appearance most suggestive for bilateral pneumonia.  < end of copied text >    Advanced directives addressed: full resuscitation
HOSPITALIST ATTENDING PROGRESS NOTE    Chart and meds reviewed.  Patient seen and examined.    CC: near syncope    Subjective: Reports breathing is ok at rest but has ALANIZ with minimal exertion. Productive cough.     All other systems reviewed and found to be negative with the exception of what has been described above.    MEDICATIONS  (STANDING):  aspirin enteric coated 81 milliGRAM(s) Oral daily  budesonide 160 MICROgram(s)/formoterol 4.5 MICROgram(s) Inhaler 2 Puff(s) Inhalation two times a day  busPIRone 10 milliGRAM(s) Oral two times a day  cefepime   IVPB      cefepime   IVPB 2000 milliGRAM(s) IV Intermittent every 12 hours  diltiazem    milliGRAM(s) Oral daily  doxazosin 2 milliGRAM(s) Oral at bedtime  enoxaparin Injectable 40 milliGRAM(s) SubCutaneous daily  folic acid 1 milliGRAM(s) Oral daily  gabapentin 400 milliGRAM(s) Oral three times a day  influenza   Vaccine 0.5 milliLiter(s) IntraMuscular once  methylPREDNISolone sodium succinate Injectable 40 milliGRAM(s) IV Push every 12 hours  metoprolol tartrate 25 milliGRAM(s) Oral two times a day  multivitamin 1 Tablet(s) Oral daily  pantoprazole    Tablet 40 milliGRAM(s) Oral before breakfast  polyethylene glycol 3350 17 Gram(s) Oral daily  QUEtiapine 100 milliGRAM(s) Oral at bedtime  saccharomyces boulardii 250 milliGRAM(s) Oral two times a day  senna 2 Tablet(s) Oral at bedtime  spironolactone 25 milliGRAM(s) Oral daily  thiamine 100 milliGRAM(s) Oral daily  tiotropium 18 MICROgram(s) Capsule 1 Capsule(s) Inhalation daily    MEDICATIONS  (PRN):  acetaminophen     Tablet .. 650 milliGRAM(s) Oral every 6 hours PRN Mild Pain (1 - 3)  ALBUTerol    90 MICROgram(s) HFA Inhaler 2 Puff(s) Inhalation every 6 hours PRN Shortness of Breath and/or Wheezing  LORazepam     Tablet 1 milliGRAM(s) Oral every 1 hour PRN CIWA-Ar score 8 or greater      VITALS:  T(F): 97.5 (11-12-21 @ 08:48), Max: 97.9 (11-11-21 @ 19:58)  HR: 88 (11-12-21 @ 09:10) (86 - 96)  BP: 121/57 (11-12-21 @ 08:48) (114/56 - 143/72)  RR: 18 (11-12-21 @ 08:48) (18 - 19)  SpO2: 96% (11-12-21 @ 08:48) (96% - 96%)  Wt(kg): --    I&O's Summary    11 Nov 2021 07:01  -  12 Nov 2021 07:00  --------------------------------------------------------  IN: 0 mL / OUT: 700 mL / NET: -700 mL        CAPILLARY BLOOD GLUCOSE          PHYSICAL EXAM:  Gen: NAD  HEENT:  pupils equal and reactive, EOMI, no oropharyngeal lesions, erythema, exudates, oral thrush  NECK:   supple, no carotid bruits, no palpable lymph nodes, no thyromegaly  CV:  +S1, +S2, regular, no murmurs or rubs  RESP:   lungs clear to auscultation bilaterally, + b/l wheezing, rales, rhonchi, good air entry bilaterally  BREAST:  not examined  GI:  abdomen soft, non-tender, non-distended, normal BS, no bruits, no abdominal masses, no palpable masses  RECTAL:  not examined  :  not examined  MSK:   normal muscle tone, no atrophy, no rigidity, no contractions  EXT:  no clubbing, no cyanosis, no edema, no calf pain, swelling or erythema, L BKA   VASCULAR:  pulses equal and symmetric in the upper and lower extremities  NEURO:  AAOX3, no focal neurological deficits, follows all commands, able to move extremities spontaneously  SKIN:  no ulcers, lesions or rashes    LABS:                            11.8   11.43 )-----------( 154      ( 12 Nov 2021 06:35 )             36.6     11-12    138  |  105  |  18  ----------------------------<  169<H>  3.9   |  27  |  0.86    Ca    9.5      12 Nov 2021 06:35    CULTURES:  UCx < 10K  BCx NGTD  Sputum cx: rare gram variable cocci    Additional results/Imaging, I have personally reviewed:  CXR 11/9/21: Bilateral lung parenchymal opacities are noted, right greater than left, with appearance most suggestive for bilateral pneumonia.    CTH 11/9/21: 1)  chronic partial empty sella. Otherwise unremarkable CT study of the brain. No acute abnormality suggested. 2)  no intracerebral hemorrhage, contusion, or extracerebral collections are identified.    [employer] stress 10/19/21: Dilated left ventricle. No scan evidence of reversible or fixed perfusion defects. Normal left ventricular contractility with an ejection fraction of 73% (Normal: 50% or greater). No regional wall motion abnormalities.    Echo 6/30/21: Mild concentric left ventricular hypertrophy is present. Estimated left ventricular ejection fraction is 60-65 %. The left atrium is severely dilated. The right atrium is not well seen. The right ventricle is not well seen, appears grossly normal. The aortic valve is trileaflet with thin pliable leaflets. Fibrocalcific changes noted to the mitral valve leaflets with preserved leaflet excursion. Highly eccentric mitral regurgitation is noted.(? severity-study limited)) The tricuspid valve leaflets are thin and pliable; valve motion is normal. Moderate (2+) tricuspid valve regurgitation is present. Moderate pulmonary hypertension. No evidence of pericardial effusion.    Telemetry, personally reviewed:  11/12/21: Afib 70-90
REASON FOR VISIT: AF    HPI:  65 y/o with a cardiac history of:    *HFpEF  *afib-persistent/permanent  *s/p CVA-per MRI study '17-"mild chronic microvascular changes"  *s/p watchman Apr '19-due to fall risk  *HTN    *Obesity-BMI=40  *VIJAY noncompliant w/ CPAP  *ETOHism  *recurrent falls s/p BKA  *COPD    He was admitted on 11/9/21 following a near-syncopal episode associated with tachycardia; found to have bilateral pneumonia + acute on chronic respiratory failure and with multidrug resistance organisms in pulmonary secretions.    11/10/21: Afib rate controlled. No chest pain  11/11/21:  Feels better; persistent headache/fatigue; no cardiac complaints.  11/12/'21: no complaints  11/13/21: no complaints of cp,palpitations.  +SOB with exertion (improving)    MEDICATIONS:  OUTPATIENT  Home Medications:  acetaminophen 325 mg oral tablet: 2 tab(s) orally every 4 hours, As Needed for general discomfort (09 Nov 2021 11:55)  albuterol 2.5 mg/3 mL (0.083%) inhalation solution: 3 milliliter(s) inhaled every 4 hours, As Needed for wheezing (09 Nov 2021 11:55)  albuterol 90 mcg/inh inhalation aerosol: 2 puff(s) inhaled every 4 hours, As Needed for wheezing (09 Nov 2021 11:55)  Aspirin Enteric Coated 81 mg oral delayed release tablet: 1 tab(s) orally once a day (09 Nov 2021 12:30)  budesonide-formoterol 160 mcg-4.5 mcg/inh inhalation aerosol: 2 puff(s) inhaled 2 times a day (09 Nov 2021 11:55)  busPIRone 10 mg oral tablet: 1 tab(s) orally 2 times a day (09 Nov 2021 11:55)  dilTIAZem 120 mg/24 hours oral capsule, extended release: 1 cap(s) orally once a day (09 Nov 2021 11:55)  doxazosin 2 mg oral tablet: 1 tab(s) orally once a day (at bedtime) (09 Nov 2021 11:55)  Florastor 250 mg oral capsule: 1 cap(s) orally 2 times a day (09 Nov 2021 12:30)  gabapentin 400 mg oral capsule: 1 cap(s) orally 3 times a day (09 Nov 2021 11:55)  GlycoLax oral powder for reconstitution: 17 gram(s) orally once a day (09 Nov 2021 12:30)  Lasix 80 mg oral tablet: 1 tab(s) orally 2 times a day (09 Nov 2021 11:55)  Metoprolol Tartrate 25 mg oral tablet: 1 tab(s) orally 2 times a day (09 Nov 2021 11:55)  omeprazole 40 mg oral delayed release capsule: 1 cap(s) orally once a day (09 Nov 2021 11:55)  QUEtiapine 50 mg oral tablet: 2 tab(s) orally once a day (at bedtime) (09 Nov 2021 12:30)  senna oral tablet: 2 tab(s) orally once a day (at bedtime) (09 Nov 2021 11:55)  Spiriva 18 mcg inhalation capsule: 1 cap(s) inhaled once a day (09 Nov 2021 11:55)  spironolactone 25 mg oral tablet: 1 tab(s) orally once a day (09 Nov 2021 12:30)  thiamine 100 mg oral tablet: 1 tab(s) orally once a day (09 Nov 2021 11:55)    MEDICATIONS  (STANDING):  aspirin enteric coated 81 milliGRAM(s) Oral daily  budesonide 160 MICROgram(s)/formoterol 4.5 MICROgram(s) Inhaler 2 Puff(s) Inhalation two times a day  busPIRone 10 milliGRAM(s) Oral two times a day  cefepime   IVPB      cefepime   IVPB 2000 milliGRAM(s) IV Intermittent every 12 hours  diltiazem    milliGRAM(s) Oral daily  doxazosin 2 milliGRAM(s) Oral at bedtime  enoxaparin Injectable 40 milliGRAM(s) SubCutaneous daily  folic acid 1 milliGRAM(s) Oral daily  gabapentin 400 milliGRAM(s) Oral three times a day  influenza   Vaccine 0.5 milliLiter(s) IntraMuscular once  methylPREDNISolone sodium succinate Injectable 40 milliGRAM(s) IV Push every 12 hours  metoprolol tartrate 25 milliGRAM(s) Oral two times a day  multivitamin 1 Tablet(s) Oral daily  pantoprazole    Tablet 40 milliGRAM(s) Oral before breakfast  polyethylene glycol 3350 17 Gram(s) Oral daily  QUEtiapine 100 milliGRAM(s) Oral at bedtime  saccharomyces boulardii 250 milliGRAM(s) Oral two times a day  senna 2 Tablet(s) Oral at bedtime  spironolactone 25 milliGRAM(s) Oral daily  thiamine 100 milliGRAM(s) Oral daily  tiotropium 18 MICROgram(s) Capsule 1 Capsule(s) Inhalation daily    MEDICATIONS  (PRN):  acetaminophen     Tablet .. 650 milliGRAM(s) Oral every 6 hours PRN Mild Pain (1 - 3)  ALBUTerol    90 MICROgram(s) HFA Inhaler 2 Puff(s) Inhalation every 6 hours PRN Shortness of Breath and/or Wheezing  LORazepam     Tablet 1 milliGRAM(s) Oral every 1 hour PRN CIWA-Ar score 8 or greater    Vital Signs Last 24 Hrs  T(C): 36.4 (13 Nov 2021 07:20), Max: 36.4 (13 Nov 2021 07:20)  T(F): 97.5 (13 Nov 2021 07:20), Max: 97.5 (13 Nov 2021 07:20)  HR: 88 (13 Nov 2021 09:14) (79 - 95)  BP: 115/62 (13 Nov 2021 07:20) (115/62 - 144/73)  BP(mean): --  RR: 18 (13 Nov 2021 07:20) (18 - 18)  SpO2: 97% (13 Nov 2021 07:20) (97% - 97%)  11 Nov 2021 07:01  -  12 Nov 2021 07:00  --------------------------------------------------------  IN: 0 mL / OUT: 700 mL / NET: -700 mL        I&O's Detail    11 Nov 2021 07:01  -  12 Nov 2021 07:00  --------------------------------------------------------  IN:  Total IN: 0 mL    OUT:    Voided (mL): 700 mL  Total OUT: 700 mL    Total NET: -700 mL          I&O's Summary    11 Nov 2021 07:01  -  12 Nov 2021 07:00  --------------------------------------------------------  IN: 0 mL / OUT: 700 mL / NET: -700 mL        PHYSICAL EXAM:  Constitutional: NAD, awake and alert, obese  Respiratory: Breath sounds are clear bilaterally, No wheezing, rales or rhonchi  Cardiovascular: Irregular, normal rate  Gastrointestinal: Bowel Sounds present, central obesity, abdomen is soft, nontender.   Extremities: L BKA     Tele: Afib 80's-90's, PVC's    LABS: All Labs Reviewed:                          12.2   9.82  )-----------( 133      ( 13 Nov 2021 06:45 )             36.9                           11.8   11.43 )-----------( 154      ( 12 Nov 2021 06:35 )             36.6                         11.5   11.38 )-----------( 107      ( 10 Nov 2021 07:22 )             33.9     11-13    140  |  107  |  27<H>  ----------------------------<  147<H>  4.3   |  29  |  0.80    Ca    9.3      13 Nov 2021 06:45        12 Nov 2021 06:35    138    |  105    |  18     ----------------------------<  169    3.9     |  27     |  0.86   10 Nov 2021 07:22    136    |  101    |  24     ----------------------------<  105    3.4     |  30     |  0.93     Ca    9.5        12 Nov 2021 06:35  Ca    8.5        10 Nov 2021 07:22  Phos  2.5       10 Nov 2021 07:22  Mg     2.0       10 Nov 2021 07:22            Blood Culture: Organism --  Gram Stain Blood -- Gram Stain   Few polymorphonuclear leukocytes per low power field  Few Squamous epithelial cells per low power field  Rare Yeast like cells seen per oil power field  Rare Gram Variable Cocci seen per oil power field  Specimen Source .Sputum Sputum  Culture-Blood --    Organism --  Gram Stain Blood -- Gram Stain --  Specimen Source Clean Catch None  Culture-Blood --    Organism --  Gram Stain Blood -- Gram Stain --  Specimen Source .Blood None  Culture-Blood --    Organism --  Gram Stain Blood -- Gram Stain --  Specimen Source .Blood None  Culture-Blood --      ===============================  12 Lead ECG (11.09.21 @ 09:22):  Atrial fibrillation with rapid ventricular response  Anteroseptal infarct (cited on or before 17-AUG-2017)  Abnormal ECG    TTE Echo Complete w/o Contrast w/ Doppler (06.30.21 @ 14:49) >   Mild concentric left ventricular hypertrophy is present. Estimated left ventricular ejection fraction is 60-65 %.   The left atrium is severely dilated.   The right ventricle is not well seen, appears grossly normal.   Highly eccentric mitral regurgitation is noted.(? severity-study limited)   Moderate (2+) tricuspid valve regurgitation.   Moderate pulmonary hypertension.     Xray Chest 1 View- PORTABLE-Urgent (Xray Chest 1 View- PORTABLE-Urgent .) (11.09.21 @ 09:56):  Bilateral lung parenchymal opacities are noted, right greater than left, with appearance most suggestive for bilateral pneumonia.    NM Nuclear Stress Pharmacologic Multiple (10.19.21 @ 09:25):  SPECT Myocardial Perfusion Imaging demonstrates:  Dilated left ventricle.  No scan evidence of reversible or fixed perfusion defects.  Normal left ventricular contractility with an ejection fraction of 73% (Normal: 50% or greater).  No regional wall motion abnormalities.    ==============================    Dominick Middleton M.D.  Cardiology, [facility]  Cell: [phone]  Offices:   [phone] ([facility] Office)  [phone] ([facility] Office)         
REASON FOR VISIT: AF    HPI:  67 y/o with a cardiac history of:    *HFpEF  *afib-persistent/permanent  *s/p CVA-per MRI study '17-"mild chronic microvascular changes"  *s/p watchman Apr -due to fall risk  *HTN    *Obesity-BMI=40  *VIJAY noncompliant w/ CPAP  *ETOHism  *recurrent falls s/p BKA  *COPD    He was admitted on 21 following a near-syncopal episode associated with tachycardia; found to have bilateral pneumonia + acute on chronic respiratory failure and with multidrug resistance organisms in pulmonary secretions.    11/10/21: Afib rate controlled. No chest pain  21:  Feels better; persistent headache/fatigue; no cardiac complaints.  : no complaints    MEDICATIONS:  OUTPATIENT  Home Medications:  acetaminophen 325 mg oral tablet: 2 tab(s) orally every 4 hours, As Needed for general discomfort (2021 11:55)  albuterol 2.5 mg/3 mL (0.083%) inhalation solution: 3 milliliter(s) inhaled every 4 hours, As Needed for wheezing (2021 11:55)  albuterol 90 mcg/inh inhalation aerosol: 2 puff(s) inhaled every 4 hours, As Needed for wheezing (2021 11:55)  Aspirin Enteric Coated 81 mg oral delayed release tablet: 1 tab(s) orally once a day (2021 12:30)  budesonide-formoterol 160 mcg-4.5 mcg/inh inhalation aerosol: 2 puff(s) inhaled 2 times a day (2021 11:55)  busPIRone 10 mg oral tablet: 1 tab(s) orally 2 times a day (2021 11:55)  dilTIAZem 120 mg/24 hours oral capsule, extended release: 1 cap(s) orally once a day (2021 11:55)  doxazosin 2 mg oral tablet: 1 tab(s) orally once a day (at bedtime) (2021 11:55)  Florastor 250 mg oral capsule: 1 cap(s) orally 2 times a day (2021 12:30)  gabapentin 400 mg oral capsule: 1 cap(s) orally 3 times a day (2021 11:55)  GlycoLax oral powder for reconstitution: 17 gram(s) orally once a day (2021 12:30)  Lasix 80 mg oral tablet: 1 tab(s) orally 2 times a day (2021 11:55)  Metoprolol Tartrate 25 mg oral tablet: 1 tab(s) orally 2 times a day (2021 11:55)  omeprazole 40 mg oral delayed release capsule: 1 cap(s) orally once a day (2021 11:55)  QUEtiapine 50 mg oral tablet: 2 tab(s) orally once a day (at bedtime) (2021 12:30)  senna oral tablet: 2 tab(s) orally once a day (at bedtime) (2021 11:55)  Spiriva 18 mcg inhalation capsule: 1 cap(s) inhaled once a day (2021 11:55)  spironolactone 25 mg oral tablet: 1 tab(s) orally once a day (2021 12:30)  thiamine 100 mg oral tablet: 1 tab(s) orally once a day (2021 11:55)      INPATIENT  MEDICATIONS  (STANDING):  aspirin enteric coated 81 milliGRAM(s) Oral daily  budesonide 160 MICROgram(s)/formoterol 4.5 MICROgram(s) Inhaler 2 Puff(s) Inhalation two times a day  busPIRone 10 milliGRAM(s) Oral two times a day  cefepime   IVPB      cefepime   IVPB 2000 milliGRAM(s) IV Intermittent every 12 hours  diltiazem    milliGRAM(s) Oral daily  doxazosin 2 milliGRAM(s) Oral at bedtime  enoxaparin Injectable 40 milliGRAM(s) SubCutaneous daily  folic acid 1 milliGRAM(s) Oral daily  gabapentin 400 milliGRAM(s) Oral three times a day  influenza   Vaccine 0.5 milliLiter(s) IntraMuscular once  methylPREDNISolone sodium succinate Injectable 40 milliGRAM(s) IV Push every 12 hours  metoprolol tartrate 25 milliGRAM(s) Oral two times a day  multivitamin 1 Tablet(s) Oral daily  pantoprazole    Tablet 40 milliGRAM(s) Oral before breakfast  polyethylene glycol 3350 17 Gram(s) Oral daily  QUEtiapine 100 milliGRAM(s) Oral at bedtime  saccharomyces boulardii 250 milliGRAM(s) Oral two times a day  senna 2 Tablet(s) Oral at bedtime  spironolactone 25 milliGRAM(s) Oral daily  thiamine 100 milliGRAM(s) Oral daily  tiotropium 18 MICROgram(s) Capsule 1 Capsule(s) Inhalation daily    MEDICATIONS  (PRN):  acetaminophen     Tablet .. 650 milliGRAM(s) Oral every 6 hours PRN Mild Pain (1 - 3)  ALBUTerol    90 MICROgram(s) HFA Inhaler 2 Puff(s) Inhalation every 6 hours PRN Shortness of Breath and/or Wheezing  LORazepam     Tablet 1 milliGRAM(s) Oral every 1 hour PRN CIWA-Ar score 8 or greater      Vital Signs Last 24 Hrs  T(C): 36.4 (2021 08:48), Max: 36.6 (2021 19:58)  T(F): 97.5 (2021 08:48), Max: 97.9 (2021 19:58)  HR: 88 (2021 09:10) (86 - 96)  BP: 121/57 (2021 08:48) (114/56 - 143/72)  BP(mean): 88 (2021 19:58) (88 - 88)  RR: 18 (2021 08:48) (18 - 19)  SpO2: 96% (2021 08:48) (96% - 96%)Daily     Daily Weight in k (2021 06:12)I&O's Summary    2021 07:  -  2021 07:00  --------------------------------------------------------  IN: 0 mL / OUT: 700 mL / NET: -700 mL        I&O's Detail    2021 07:  -  2021 07:00  --------------------------------------------------------  IN:  Total IN: 0 mL    OUT:    Voided (mL): 700 mL  Total OUT: 700 mL    Total NET: -700 mL          I&O's Summary    2021 07:01  -  2021 07:00  --------------------------------------------------------  IN: 0 mL / OUT: 700 mL / NET: -700 mL        PHYSICAL EXAM:  Constitutional: NAD, awake and alert, obese  Respiratory: Breath sounds are clear bilaterally, No wheezing, rales or rhonchi  Cardiovascular: Irregular, normal rate  Gastrointestinal: Bowel Sounds present, central obesity, abdomen is soft, nontender.   Extremities: L BKA     LABS: All Labs Reviewed:                        11.8   11.43 )-----------( 154      ( 2021 06:35 )             36.6                         11.5   11.38 )-----------( 107      ( 10 Nov 2021 07:22 )             33.9     2021 06:35    138    |  105    |  18     ----------------------------<  169    3.9     |  27     |  0.86   10 Nov 2021 07:22    136    |  101    |  24     ----------------------------<  105    3.4     |  30     |  0.93     Ca    9.5        2021 06:35  Ca    8.5        10 Nov 2021 07:22  Phos  2.5       10 Nov 2021 07:22  Mg     2.0       10 Nov 2021 07:22            Blood Culture: Organism --  Gram Stain Blood -- Gram Stain   Few polymorphonuclear leukocytes per low power field  Few Squamous epithelial cells per low power field  Rare Yeast like cells seen per oil power field  Rare Gram Variable Cocci seen per oil power field  Specimen Source .Sputum Sputum  Culture-Blood --    Organism --  Gram Stain Blood -- Gram Stain --  Specimen Source Clean Catch None  Culture-Blood --    Organism --  Gram Stain Blood -- Gram Stain --  Specimen Source .Blood None  Culture-Blood --    Organism --  Gram Stain Blood -- Gram Stain --  Specimen Source .Blood None  Culture-Blood --      ===============================  12 Lead ECG (21 @ 09:22):  Atrial fibrillation with rapid ventricular response  Anteroseptal infarct (cited on or before 17-AUG-2017)  Abnormal ECG    TTE Echo Complete w/o Contrast w/ Doppler (21 @ 14:49) >   Mild concentric left ventricular hypertrophy is present. Estimated left ventricular ejection fraction is 60-65 %.   The left atrium is severely dilated.   The right ventricle is not well seen, appears grossly normal.   Highly eccentric mitral regurgitation is noted.(? severity-study limited)   Moderate (2+) tricuspid valve regurgitation.   Moderate pulmonary hypertension.     Xray Chest 1 View- PORTABLE-Urgent (Xray Chest 1 View- PORTABLE-Urgent .) (21 @ 09:56):  Bilateral lung parenchymal opacities are noted, right greater than left, with appearance most suggestive for bilateral pneumonia.    NM Nuclear Stress Pharmacologic Multiple (10.19.21 @ 09:25):  SPECT Myocardial Perfusion Imaging demonstrates:  Dilated left ventricle.  No scan evidence of reversible or fixed perfusion defects.  Normal left ventricular contractility with an ejection fraction of 73% (Normal: 50% or greater).  No regional wall motion abnormalities.    ==============================    Dominick Middleton M.D.  Cardiology, [facility]  Cell: [phone]  Offices:    ([facility] Office)  [phone] ([facility] Office)         
PCP:    REQUESTING PHYSICIAN:    REASON FOR CONSULT:    CHIEF COMPLAINT:    HPI:  67 y/o w/    *HFpEF  *afib-persistent/permanent  *s/p CVA-per MRI study '17-"mild chronic microvascular changes"  *s/p watchman -due to fall risk  *HTN    *Obesity-BMI=40  *VIJAY noncompliant w/ CPAP  *ETOHism  *recurrent falls s/p BKA  *COPD    presents to ED for a near syncopeal episode this morning where his legs gave out;  Did not lose consciousness.   landed on his back onto his head, wasn't able to get up afterwards,   was lifted up with assistance from other people.   He states that he had a few drinks last night; Patient denies any Chest pain; does have shortness of breath with exertion that is chronic as per the patient.     EMS noted pt had elevated HR up to 170s, was given amiodarone in route.    In the ER, patient was AFIB with RVR.  PAtient was given IV cardizem 10mg IV X 2. He was also given Rocephin and zithromax     Evaluated pt in ED. Currently comfortable w/ HRs 100s-120s  & SBPs 100s.  Denies chest discomfort, palpitations, dyspnea, or weight gain.  Reports compliance w/ meds.  Admits to drinking 6-8 shots of tequila  11/10/21: Afib rate controlled. No chest pain    FM - Unknown as patient is adopted  (2021 14:50)      PAST MEDICAL & SURGICAL HISTORY:  Chronic atrial fibrillation    Alcohol abuse    Poor historian    CHF (congestive heart failure)    Cirrhosis    Emphysema of lung    Alcohol withdrawal    Collapsed lung    Meningitis    Falls    Anemia    Chronic obstructive pulmonary disease (COPD)    GERD (gastroesophageal reflux disease)    Hypertension    Presence of Watchman left atrial appendage closure device    Atrial fibrillation    COPD without exacerbation    Chronic CHF    S/P BKA (below knee amputation) unilateral, left    Presence of Watchman left atrial appendage closure device    Unilateral amputation of lower extremity below knee    H/O tracheostomy  now removed    S/P percutaneous endoscopic gastrostomy (PEG) tube placement  now removed        SOCIAL HISTORY:    FAMILY HISTORY:      ALLERGIES:  Allergies    Ceclor (Rash)  Duricef (Rash)    Intolerances        MEDICATIONS:    MEDICATIONS  (STANDING):  aspirin enteric coated 81 milliGRAM(s) Oral daily  azithromycin   Tablet 500 milliGRAM(s) Oral daily  budesonide 160 MICROgram(s)/formoterol 4.5 MICROgram(s) Inhaler 2 Puff(s) Inhalation two times a day  busPIRone 10 milliGRAM(s) Oral two times a day  cefTRIAXone Injectable. 1000 milliGRAM(s) IV Push every 24 hours  diltiazem    milliGRAM(s) Oral daily  diltiazem Infusion 5 mG/Hr (5 mL/Hr) IV Continuous <Continuous>  doxazosin 2 milliGRAM(s) Oral at bedtime  enoxaparin Injectable 40 milliGRAM(s) SubCutaneous daily  folic acid 1 milliGRAM(s) Oral daily  gabapentin 400 milliGRAM(s) Oral three times a day  influenza   Vaccine 0.5 milliLiter(s) IntraMuscular once  metoprolol tartrate 25 milliGRAM(s) Oral two times a day  multivitamin 1 Tablet(s) Oral daily  pantoprazole    Tablet 40 milliGRAM(s) Oral before breakfast  polyethylene glycol 3350 17 Gram(s) Oral daily  QUEtiapine 100 milliGRAM(s) Oral at bedtime  saccharomyces boulardii 250 milliGRAM(s) Oral two times a day  senna 2 Tablet(s) Oral at bedtime  sodium chloride 0.9%. 1000 milliLiter(s) (50 mL/Hr) IV Continuous <Continuous>  thiamine 100 milliGRAM(s) Oral daily  tiotropium 18 MICROgram(s) Capsule 1 Capsule(s) Inhalation daily    MEDICATIONS  (PRN):  ALBUTerol    90 MICROgram(s) HFA Inhaler 2 Puff(s) Inhalation every 6 hours PRN Shortness of Breath and/or Wheezing  LORazepam     Tablet 1 milliGRAM(s) Oral every 1 hour PRN CIWA-Ar score 8 or greater        Vital Signs Last 24 Hrs  T(C): 36.8 (2021 21:34), Max: 37 (2021 09:33)  T(F): 98.2 (2021 21:34), Max: 98.6 (2021 09:33)  HR: 93 (2021 21:34) (93 - 130)  BP: 108/64 (2021 21:34) (94/52 - 143/57)  BP(mean): 67 (2021 17:44) (67 - 111)  RR: 22 (2021 21:34) (11 - 24)  SpO2: 93% (2021 21:34) (92% - 97%)Daily Height in cm: 170.18 (2021 09:21)    Daily Weight in k.9 (2021 21:34)I&O's Summary    2021 07:01  -  10 Nov 2021 07:00  --------------------------------------------------------  IN: 0 mL / OUT: 925 mL / NET: -925 mL        PHYSICAL EXAM:    Constitutional: NAD, awake and alert, obese  HEENT: PERR, EOMI,  No oral cyananosis.  Neck:  supple,  No JVD  Respiratory: Breath sounds are clear bilaterally, No wheezing, rales or rhonchi  Cardiovascular: S1 and S2, irregular  Gastrointestinal: Bowel Sounds present, soft, nontender.   Extremities: BKA left  Vascular: 1+ peripheral pulses  Neurological: A/O x 3, no focal deficits  Musculoskeletal: no calf tenderness.  Skin: No rashes.      LABS: All Labs Reviewed:                        11.5   11.38 )-----------( 107      ( 10 Nov 2021 07:22 )             33.9                         13.6   17.13 )-----------( 114      ( 2021 09:24 )             40.3     10 Nov 2021 07:22    136    |  101    |  24     ----------------------------<  105    3.4     |  30     |  0.93   2021 09:24    132    |  96     |  27     ----------------------------<  146    3.7     |  28     |  1.85     Ca    8.5        10 Nov 2021 07:22  Ca    8.5        2021 09:24  Phos  2.5       10 Nov 2021 07:22  Mg     2.0       10 Nov 2021 07:22  Mg     1.7       2021 09:24    TPro  7.5    /  Alb  3.2    /  TBili  1.1    /  DBili  x      /  AST  20     /  ALT  18     /  AlkPhos  106    2021 09:24    PT/INR - ( 2021 09:24 )   PT: 13.6 sec;   INR: 1.18 ratio         PTT - ( 2021 09:24 )  PTT:31.5 sec      Blood Culture:    @ 09:24  Pro Bnp [id number]     @ 09:24  TSH: 0.59      RADIOLOGY/EKG:< from: 12 Lead ECG (21 @ 09:22) >  Diagnosis Line Atrial fibrillation with rapid ventricular response  Anteroseptal infarct (cited on or before 17-AUG-2017)  Abnormal ECG  Confirmed by KATE ALONSO MD ([id number]) on 2021 10:44:01 PM    < end of copied text >        ECHO/CARDIAC CATHTERIZATION/STRESS TEST:< from: TTE Echo Complete w/o Contrast w/ Doppler (21 @ 14:49) >  Impression     Summary     Mild concentric left ventricular hypertrophy is present.   Estimated left ventricular ejection fraction is 60-65 %.   The left atrium is severely dilated.   The right atrium is not well seen.   The right ventricle is not well seen, appears grossly normal.   The aortic valve is trileafletwith thin pliable leaflets.   Fibrocalcific changes noted to the mitral valve leaflets with preserved   leaflet excursion.   Highly eccentric mitral regurgitation is noted.(? severity-study limited))   The tricuspid valve leaflets are thin and pliable; valve motion is normal.   Moderate (2+) tricuspid valve regurgitation is present.   Moderate pulmonary hypertension.   No evidence of pericardial effusion.     Signature     ----------------------------------------------------------------   Electronically signed by Chapito Sim MD(Interpreting   physician) on 2021 05:43 PM    < end of copied text >

## 2021-11-15 NOTE — DISCHARGE NOTE NURSING/CASE MANAGEMENT/SOCIAL WORK - NSDCVIVACCINE_GEN_ALL_CORE_FT
influenza, injectable, quadrivalent, preservative free; 21-Nov-2019 14:53; Cathryn Burks (RN); [employer]; [id number] (Exp. Date: 30-Jun-2020); IntraMuscular; Deltoid Left.; 0.5 milliLiter(s); VIS (VIS Published: 15-Aug-2019, VIS Presented: 21-Nov-2019);   influenza, injectable, quadrivalent, preservative free; 17-Oct-2020 16:13; Bhavya Brown (RN); [employer]; [id number] (Exp. Date: 30-Jun-2021); IntraMuscular; Deltoid Left.; 0.5 milliLiter(s); VIS (VIS Published: 15-Aug-2019, VIS Presented: 17-Oct-2020);

## 2021-11-15 NOTE — CDI QUERY NOTE - NSCDIOTHERTXTBX_GEN_ALL_CORE_HH
clarification respiratory status conflicting documentation chronic respiratory failure acute on chronic respiratory failure    The Physician's or Provider's documentation of the patient's presentation, evaluation and medical management, as identified below, may support a diagnosis that is not documented to the furthest specificity in the medical record. Please clarify in your progress notes and/or discharge summary if there is a corresponding diagnosis associated with the clinical information described below:                               -Acute on chronic respiratory failure  ruled in   -Acute on chronic respiratory failure  ruled out  -Acute Respiratory Distress Syndrome                                  -Chronic respiratory failure    -Other (specify)  -Unknown    SUPPORTING DOCUMENTATION AND/OR CLINICAL EVIDENCE:    Pulse Oximeter 92  RR: 18 (11-15-21) (11 - 24)  SpO2: 95% (11-15-21) (92% - 98%)    Oxygen Delivery Therapy:   Oxygen Method: Nasal Cannula   LPM: 2   Targeted SpO2 Range (%): 88-97   O2 Titration Guideline: Device O2 Percent Range (%): 24-44%, Device O2 Flow Rate Range (LPM): 1-6LPM, O2 Titration Guideline: Increase/Decrease by 1LPM or 4%. Notify provider for any increase in oxygen therapy or inability to achieve targeted SpO2. (11-09-21)    I&D documents:The Physician's or Provider's documentation of the patient's presentation, evaluation and medical management, as identified below, may support a diagnosis that is not documented to the furthest specificity in the medical record. Please clarify in your progress notes and/or discharge summary if there is a corresponding diagnosis associated with the clinical information described below:    O2 % 3 L NC

## 2021-11-15 NOTE — CDI QUERY NOTE - NSCDIOTHERTXTBX2_GEN_ALL_CORE_FT
acuity diastolic chf    Clinical documentation indicates that this patient has diastolic CHF.  Please include more specific documentation of the acuity and, type  of Heart Failure in your Progress Note and/or Discharge Summary.    -Acute on chronic ___ CHF_diastolic  -Acute ___ CHF _diastolic-  -Chronic ___ CHF  -Other (please specify)  -Not clinically significant      SUPPORTING DOCUMENTATION AND/OR CLINICAL EVIDENCE:    Cardiology Problem/Recommendation - 2:·  Problem: R/O Diastolic dysfunction with heart failure. ·  Recommendation: Appears mildly hypervolemic.  primary team holding diuretics at this time  2/2 hypotension, cautiously watch fluid status & restart diureticsif respiratory status declines.    CHEST XRAY:< from: Xray Chest 1 View- PORTABLE-Urgent (Xray Chest 1 View- PORTABLE-Urgent .) (11.09.21 @ 09:56) >  IMPRESSION: Bilateral lung parenchymal opacities are noted, right greater than left, with appearance most suggestive for bilateral pneumonia.    BNP:>3000Serum Pro-Brain Natriuretic Peptide: 3031 pg/mL *H* [0 - 125] (11-13-21)Serum Pro-Brain Natriuretic Peptide: 4176 pg/mL *H* [0 - 125] (11-09-21)    MEDICATIONS:furosemide    Tablet 80 milliGRAM(s) Oral (11-13-21) 80 milliGRAM(s) Oral (11-14-21) 80 milliGRAM(s) Oral (11-14-21) 80 milliGRAM(s) Oral (11-15-21)

## 2021-11-15 NOTE — DISCHARGE NOTE NURSING/CASE MANAGEMENT/SOCIAL WORK - PATIENT PORTAL LINK FT
You can access the FollowMyHealth Patient Portal offered by [facility] by registering at the following website: [email or web address]. By joining [facility]’s FollowMyHealth portal, you will also be able to view your health information using other applications (apps) compatible with our system.

## 2021-11-15 NOTE — DISCHARGE NOTE PROVIDER - HOSPITAL COURSE
History of Present Illness:   67 yo Male with PMH of diastolic CHF (LVEF 55-60% 5/2020), a. fib s/p watchman device, obesity, COPD, HTN, neuropathy, s/p left BKA, h/o CVA, h/o trach s/p removal, h/o etoh abuse, h/o pneumothorax, h/o cardiac arrest presents to ED for a near syncopeal episode this morning where his legs gave out;  Did not lose consciousness. landed on his back onto his head, wasn't able to get up afterwards, was lifted up with assistance from other people. He states that he had a few drinks last night; Patient denies any Chest pain; does have shortness of breath with exertion that is chronic as per the patient.     EMS noted pt had elevated HR up to 170s, was given amiodarone in route.  In the ER, patient was AFIB with RVR.  PAtient was given IV cardizem 10mg IV X 2. He was also given Rocephin and zithromax   -found to have Pna and rapid afib    11.11: still congested, deep productive coughing,   tele Afib 90/min  11.13: less cough, less congestion, less dyspnea   11.14: feels better, no cough, minimal dyspnea   11.15: no wheezing, no distress      REVIEW OF SYSTEMS:    CONSTITUTIONAL: No weakness, No fevers or chills  ENT: No ear ache, No sorethroat  NECK: No pain, No stiffness  RESPIRATORY: + cough, no wheezing, No hemoptysis; no dyspnea  CARDIOVASCULAR: No chest pain, No palpitations  GASTROINTESTINAL: No abd pain, No nausea, No vomiting, No hematemesis, No diarrhea or constipation. No melena, No hematochezia.  GENITOURINARY: No dysuria, No  hematuria  NEUROLOGICAL: No diplopia, No paresthesia, No motor dysfunction  MUSCULOSKELETAL: No arthralgia, No myalgia  SKIN: No rashes, or lesions   PSYCH: no anxiety, no suicidal ideation    All other review of systems is negative unless indicated above    Vital Signs Last 24 Hrs  T(C): 36.4 (15 Nov 2021 07:56), Max: 36.4 (15 Nov 2021 07:56)  T(F): 97.6 (15 Nov 2021 07:56), Max: 97.6 (15 Nov 2021 07:56)  HR: 70 (15 Nov 2021 07:56) (70 - 96)  BP: 124/61 (15 Nov 2021 07:56) (124/61 - 135/56)  RR: 18 (15 Nov 2021 07:56) (18 - 19)  SpO2: 95% (15 Nov 2021 07:56) (93% - 95%)  PHYSICAL EXAM:    GENERAL: NAD, obese   HEENT:  NC/AT, EOMI, PERRLA, No scleral icterus, Moist mucous membranes  NECK: Supple, No JVD  CNS:  Alert & Oriented X3, Motor Strength 5/5 B/L upper and lower extremities; DTRs 2+ intact   LUNG: bilat wheezing and crackles   HEART: RRR; No murmurs, No rubs  ABDOMEN: +BS, ST/ND/NT  GENITOURINARY: Voiding, Bladder not distended  EXTREMITIES:  2+ Peripheral Pulses, No clubbing, No cyanosis, No tibial edema  MUSCULOSKELTAL: Joints normal ROM, No TTP, No effusion  SKIN: no rashes  RECTAL: deferred, not indicated  BREAST: deferred    A/P:    1. Pna: suspect Gram Neg Mg etiology  h/o Pseudomonas Pna in the past   Cefepime day #7, Zithromax day#5  Blood Cx: ngtd  sputum Cx noted  completed Abx    2. AECopd:  Steroids IV: Solumedrol 40mg q12, will change to Prednisone 40mg/day and taper as outpatient   Albuterol Prn  Mucinex     3. Afib: controlled  c/w Cardizem CD, Lopressor   cw ASA    4. HFpEF:  c/w Lasix / Aldactone resumed     dc planning home, time 39m   History of Present Illness:   67 yo Male with PMH of diastolic CHF (LVEF 55-60% 5/2020), a. fib s/p watchman device, obesity, COPD, HTN, neuropathy, s/p left BKA, h/o CVA, h/o trach s/p removal, h/o etoh abuse, h/o pneumothorax, h/o cardiac arrest presents to ED for a near syncopeal episode this morning where his legs gave out;  Did not lose consciousness. landed on his back onto his head, wasn't able to get up afterwards, was lifted up with assistance from other people. He states that he had a few drinks last night; Patient denies any Chest pain; does have shortness of breath with exertion that is chronic as per the patient.     EMS noted pt had elevated HR up to 170s, was given amiodarone in route.  In the ER, patient was AFIB with RVR.  PAtient was given IV cardizem 10mg IV X 2. He was also given Rocephin and zithromax   -found to have Pna and rapid afib    11.11: still congested, deep productive coughing,   tele Afib 90/min  11.13: less cough, less congestion, less dyspnea   11.14: feels better, no cough, minimal dyspnea   11.15: no wheezing, no distress      REVIEW OF SYSTEMS:    CONSTITUTIONAL: No weakness, No fevers or chills  ENT: No ear ache, No sorethroat  NECK: No pain, No stiffness  RESPIRATORY: + cough, no wheezing, No hemoptysis; no dyspnea  CARDIOVASCULAR: No chest pain, No palpitations  GASTROINTESTINAL: No abd pain, No nausea, No vomiting, No hematemesis, No diarrhea or constipation. No melena, No hematochezia.  GENITOURINARY: No dysuria, No  hematuria  NEUROLOGICAL: No diplopia, No paresthesia, No motor dysfunction  MUSCULOSKELETAL: No arthralgia, No myalgia  SKIN: No rashes, or lesions   PSYCH: no anxiety, no suicidal ideation    All other review of systems is negative unless indicated above    Vital Signs Last 24 Hrs  T(C): 36.4 (15 Nov 2021 07:56), Max: 36.4 (15 Nov 2021 07:56)  T(F): 97.6 (15 Nov 2021 07:56), Max: 97.6 (15 Nov 2021 07:56)  HR: 70 (15 Nov 2021 07:56) (70 - 96)  BP: 124/61 (15 Nov 2021 07:56) (124/61 - 135/56)  RR: 18 (15 Nov 2021 07:56) (18 - 19)  SpO2: 95% (15 Nov 2021 07:56) (93% - 95%)  PHYSICAL EXAM:    GENERAL: NAD, obese   HEENT:  NC/AT, EOMI, PERRLA, No scleral icterus, Moist mucous membranes  NECK: Supple, No JVD  CNS:  Alert & Oriented X3, Motor Strength 5/5 B/L upper and lower extremities; DTRs 2+ intact   LUNG: bilat wheezing and crackles   HEART: RRR; No murmurs, No rubs  ABDOMEN: +BS, ST/ND/NT  GENITOURINARY: Voiding, Bladder not distended  EXTREMITIES:  2+ Peripheral Pulses, No clubbing, No cyanosis, No tibial edema  MUSCULOSKELTAL: Joints normal ROM, No TTP, No effusion  SKIN: no rashes  RECTAL: deferred, not indicated  BREAST: deferred    A/P:    1. Acuter resp failure due to Pna: POA  suspect Gram Neg Mg etiology  h/o Pseudomonas Pna in the past   Cefepime day #7, Zithromax day#5  Blood Cx: ngtd  sputum Cx noted  completed Abx    2. AECopd:  Steroids IV: Solumedrol 40mg q12, will change to Prednisone 40mg/day and taper as outpatient   Albuterol Prn  Mucinex     3. Afib: controlled  c/w Cardizem CD, Lopressor   cw ASA    4. HFpEF:  c/w Lasix / Aldactone resumed     dc planning home, time 39m   History of Present Illness:   65 yo Male with PMH of diastolic CHF (LVEF 55-60% 5/2020), a. fib s/p watchman device, obesity, COPD, HTN, neuropathy, s/p left BKA, h/o CVA, h/o trach s/p removal, h/o etoh abuse, h/o pneumothorax, h/o cardiac arrest presents to ED for a near syncopeal episode this morning where his legs gave out;  Did not lose consciousness. landed on his back onto his head, wasn't able to get up afterwards, was lifted up with assistance from other people. He states that he had a few drinks last night; Patient denies any Chest pain; does have shortness of breath with exertion that is chronic as per the patient.     EMS noted pt had elevated HR up to 170s, was given amiodarone in route.  In the ER, patient was AFIB with RVR.  PAtient was given IV cardizem 10mg IV X 2. He was also given Rocephin and zithromax   -found to have Pna and rapid afib    11.11: still congested, deep productive coughing,   tele Afib 90/min  11.13: less cough, less congestion, less dyspnea   11.14: feels better, no cough, minimal dyspnea   11.15: no wheezing, no distress      REVIEW OF SYSTEMS:    CONSTITUTIONAL: No weakness, No fevers or chills  ENT: No ear ache, No sorethroat  NECK: No pain, No stiffness  RESPIRATORY: + cough, no wheezing, No hemoptysis; no dyspnea  CARDIOVASCULAR: No chest pain, No palpitations  GASTROINTESTINAL: No abd pain, No nausea, No vomiting, No hematemesis, No diarrhea or constipation. No melena, No hematochezia.  GENITOURINARY: No dysuria, No  hematuria  NEUROLOGICAL: No diplopia, No paresthesia, No motor dysfunction  MUSCULOSKELETAL: No arthralgia, No myalgia  SKIN: No rashes, or lesions   PSYCH: no anxiety, no suicidal ideation    All other review of systems is negative unless indicated above    Vital Signs Last 24 Hrs  T(C): 36.4 (15 Nov 2021 07:56), Max: 36.4 (15 Nov 2021 07:56)  T(F): 97.6 (15 Nov 2021 07:56), Max: 97.6 (15 Nov 2021 07:56)  HR: 70 (15 Nov 2021 07:56) (70 - 96)  BP: 124/61 (15 Nov 2021 07:56) (124/61 - 135/56)  RR: 18 (15 Nov 2021 07:56) (18 - 19)  SpO2: 95% (15 Nov 2021 07:56) (93% - 95%)  PHYSICAL EXAM:    GENERAL: NAD, obese   HEENT:  NC/AT, EOMI, PERRLA, No scleral icterus, Moist mucous membranes  NECK: Supple, No JVD  CNS:  Alert & Oriented X3, Motor Strength 5/5 B/L upper and lower extremities; DTRs 2+ intact   LUNG: bilat wheezing and crackles   HEART: RRR; No murmurs, No rubs  ABDOMEN: +BS, ST/ND/NT  GENITOURINARY: Voiding, Bladder not distended  EXTREMITIES:  2+ Peripheral Pulses, No clubbing, No cyanosis, No tibial edema  MUSCULOSKELTAL: Joints normal ROM, No TTP, No effusion  SKIN: no rashes  RECTAL: deferred, not indicated  BREAST: deferred    A/P:    1. Pna: POA  suspect Gram Neg Mg etiology  h/o Pseudomonas Pna in the past   Cefepime day #7, Zithromax day#5  Blood Cx: ngtd  sputum Cx noted  completed Abx    2. AECopd:  Steroids IV: Solumedrol 40mg q12, will change to Prednisone 40mg/day and taper as outpatient   Albuterol Prn  Mucinex     3. Afib: controlled  c/w Cardizem CD, Lopressor   cw ASA    4. Acute on chronic diastolic Chf: poa resolved  HFpEF:  c/w Lasix / Aldactone resumed     dc planning home, time 39m   History of Present Illness:   67 yo Male with PMH of diastolic CHF (LVEF 55-60% 5/2020), a. fib s/p watchman device, obesity, COPD, HTN, neuropathy, s/p left BKA, h/o CVA, h/o trach s/p removal, h/o etoh abuse, h/o pneumothorax, h/o cardiac arrest presents to ED for a near syncopeal episode this morning where his legs gave out;  Did not lose consciousness. landed on his back onto his head, wasn't able to get up afterwards, was lifted up with assistance from other people. He states that he had a few drinks last night; Patient denies any Chest pain; does have shortness of breath with exertion that is chronic as per the patient.     EMS noted pt had elevated HR up to 170s, was given amiodarone in route.  In the ER, patient was AFIB with RVR.  PAtient was given IV cardizem 10mg IV X 2. He was also given Rocephin and zithromax   -found to have Pna and rapid afib    11.11: still congested, deep productive coughing,   tele Afib 90/min  11.13: less cough, less congestion, less dyspnea   11.14: feels better, no cough, minimal dyspnea   11.15: no wheezing, no distress      REVIEW OF SYSTEMS:    CONSTITUTIONAL: No weakness, No fevers or chills  ENT: No ear ache, No sorethroat  NECK: No pain, No stiffness  RESPIRATORY: + cough, no wheezing, No hemoptysis; no dyspnea  CARDIOVASCULAR: No chest pain, No palpitations  GASTROINTESTINAL: No abd pain, No nausea, No vomiting, No hematemesis, No diarrhea or constipation. No melena, No hematochezia.  GENITOURINARY: No dysuria, No  hematuria  NEUROLOGICAL: No diplopia, No paresthesia, No motor dysfunction  MUSCULOSKELETAL: No arthralgia, No myalgia  SKIN: No rashes, or lesions   PSYCH: no anxiety, no suicidal ideation    All other review of systems is negative unless indicated above    Vital Signs Last 24 Hrs  T(C): 36.4 (15 Nov 2021 07:56), Max: 36.4 (15 Nov 2021 07:56)  T(F): 97.6 (15 Nov 2021 07:56), Max: 97.6 (15 Nov 2021 07:56)  HR: 70 (15 Nov 2021 07:56) (70 - 96)  BP: 124/61 (15 Nov 2021 07:56) (124/61 - 135/56)  RR: 18 (15 Nov 2021 07:56) (18 - 19)  SpO2: 95% (15 Nov 2021 07:56) (93% - 95%)  PHYSICAL EXAM:    GENERAL: NAD, obese   HEENT:  NC/AT, EOMI, PERRLA, No scleral icterus, Moist mucous membranes  NECK: Supple, No JVD  CNS:  Alert & Oriented X3, Motor Strength 5/5 B/L upper and lower extremities; DTRs 2+ intact   LUNG: bilat wheezing and crackles   HEART: RRR; No murmurs, No rubs  ABDOMEN: +BS, ST/ND/NT  GENITOURINARY: Voiding, Bladder not distended  EXTREMITIES:  2+ Peripheral Pulses, No clubbing, No cyanosis, No tibial edema  MUSCULOSKELTAL: Joints normal ROM, No TTP, No effusion  SKIN: no rashes  RECTAL: deferred, not indicated  BREAST: deferred    A/P:    1. Sepsis due to Pna: POA  suspect Gram Neg Mg etiology  h/o Pseudomonas Pna in the past   Cefepime day #7, Zithromax day#5  Blood Cx: ngtd  sputum Cx noted  completed Abx    2. AECopd:  Steroids IV: Solumedrol 40mg q12, will change to Prednisone 40mg/day and taper as outpatient   Albuterol Prn  Mucinex     3. Afib: controlled  c/w Cardizem CD, Lopressor   cw ASA    4. Acute on chronic diastolic Chf: poa resolved  HFpEF:  c/w Lasix / Aldactone resumed     dc planning home, time 39m

## 2021-11-15 NOTE — PROGRESS NOTE ADULT - PROVIDER SPECIALTY LIST ADULT
Hospitalist
Infectious Disease
Cardiology
Hospitalist
Hospitalist
Infectious Disease
Hospitalist
Hospitalist
Infectious Disease
Cardiology

## 2021-11-15 NOTE — PROGRESS NOTE ADULT - ASSESSMENT
65 y/o Male with h/o diastolic CHF (LVEF 55-60% 5/2020), A.fib s/p watchman device, obesity, COPD, HTN, neuropathy, s/p left BKA, old CVA, chronic respiratory failure s/p prior trach, h/o pneumothorax, h/o cardiac arrest was admitted on 11/9 for a near syncopal episode and his legs gave out; did not lose consciousness; landed on his back onto his head, wasn't able to get up afterwards, was lifted up with assistance from other people. He states that he had a few drinks last night; Patient denies any Chest pain; does have shortness of breath with exertion. In ER he was found in rapid A.fib and received ceftriaxone and azithromycin.     1. B/l pneumonia. Acute on chronic respiratory failure resolving. Obesity. Diastolic CHF.   -h/o MDRO organisms in pulmonary secretions  -BC x 2 noted  -sputum c/s with resp raymundo  -on cefepime 2 gm IV q12# 6  -s/p azithromycin 500 mg PO qd   -tolerating abx well so far; no side effects noted  -respiratory care  -may complete abx therapy today  -monitor temps  -f/u CBC  -supportive care  2. Other issues:   -care per medicine    
67 y/o Male with h/o diastolic CHF (LVEF 55-60% 5/2020), A.fib s/p watchman device, obesity, COPD, HTN, neuropathy, s/p left BKA, old CVA, chronic respiratory failure s/p prior trach, h/o pneumothorax, h/o cardiac arrest was admitted on 11/9 for a near syncopal episode and his legs gave out; did not lose consciousness; landed on his back onto his head, wasn't able to get up afterwards, was lifted up with assistance from other people. He states that he had a few drinks last night; Patient denies any Chest pain; does have shortness of breath with exertion. In ER he was found in rapid A.fib and received ceftriaxone and azithromycin.     1. B/l pneumonia. Acute on chronic respiratory failure. Obesity. Diastolic CHF.   -h/o MDRO organisms in pulmonary secretions  -BC x 2 noted  -obtain sputum c/s  -on cefepime 2 gm IV q12h and azithromycin 500 mg PO qd # 2  -tolerating abx well so far; no side effects noted  -respiratory care  -continue abx coverage   -monitor temps  -f/u CBC  -supportive care  2. Other issues:   -care per medicine    
65 y/o Male with h/o diastolic CHF (LVEF 55-60% 5/2020), A.fib s/p watchman device, obesity, COPD, HTN, neuropathy, s/p left BKA, old CVA, chronic respiratory failure s/p prior trach, h/o pneumothorax, h/o cardiac arrest was admitted on 11/9 for a near syncopal episode and his legs gave out; did not lose consciousness; landed on his back onto his head, wasn't able to get up afterwards, was lifted up with assistance from other people. He states that he had a few drinks last night; Patient denies any Chest pain; does have shortness of breath with exertion. In ER he was found in rapid A.fib and received ceftriaxone and azithromycin.     1. B/l pneumonia. Acute on chronic respiratory failure. Obesity. Diastolic CHF.   -h/o MDRO organisms in pulmonary secretions  -BC x 2 noted  -sputum c/s collected  -on cefepime 2 gm IV q12h and azithromycin 500 mg PO qd # 3  -tolerating abx well so far; no side effects noted  -respiratory care  -continue abx coverage   -monitor temps  -f/u CBC  -supportive care  2. Other issues:   -care per medicine    
66M hx HFpEF, Afib s/p Watchman device, obesity, COPD on 2L nocturnal home O2, HTN, neuropathy, s/p L BKA, hx CVA, cirrhosis, hx prior trach s/p removal, EtOH abuse, hx prior PTX and prior cardiac arrest, p/w syncope, found to have acute hypoxic resp failure and severe sepsis 2/2 PNA, likely GNR (with hx prior pseudomonas) and Afib with RVR and ANUSHKA.    #Acute hypoxic resp failure and severe sepsis 2/2 PNA, likely GNR, w hx prior pseudomonas, with acute exacerbation of COPD  -wean O2 as able, on 2L  -BCx NGTD  -sputum cx  with gram variable cocci  -lactate wnl  -imaging as above, +CXR  -s/p Azithro 500mg x 3d  -Cefepime  -continue steroids, continued wheeze, will reduce from solumedrol 40q8 to 40q12  -symbicort, spiriva, albuterol  -appreciate ID input    #Afib with RVR, s/p watchman device- RVR in setting of sepsis  -now w improved rates  -TSH wnl  -trop neg  -echo and stress done recently  -continue dilt and metoprolol  -continue ASA    #syncope- likely in setting of sepsis and Afib with RVR    #ANUSHKA  -resolved    #HFpEF, HTN, hx CVA  -echo as above from 6/21  -BNP 4100 on admission, will repeat  -resume aldactone  -likely resume lasix in coming days  -continue ASA    #hx EtOH abuse  -CIWA (sx triggered)  -thiamine, folate, MVI    #R shin wound- appreciate wound care RN recs    #DVT ppx- SQL    #eventual return to [place], possibly 2-3d, ppw in chart to complete for return

## 2021-11-16 ENCOUNTER — NON-APPOINTMENT (OUTPATIENT)
Age: 66
End: 2021-11-16

## 2021-11-19 DIAGNOSIS — R55 SYNCOPE AND COLLAPSE: ICD-10-CM

## 2021-11-19 DIAGNOSIS — K21.9 GASTRO-ESOPHAGEAL REFLUX DISEASE WITHOUT ESOPHAGITIS: ICD-10-CM

## 2021-11-19 DIAGNOSIS — Z79.82 LONG TERM (CURRENT) USE OF ASPIRIN: ICD-10-CM

## 2021-11-19 DIAGNOSIS — Z88.8 ALLERGY STATUS TO OTHER DRUGS, MEDICAMENTS AND BIOLOGICAL SUBSTANCES STATUS: ICD-10-CM

## 2021-11-19 DIAGNOSIS — I48.21 PERMANENT ATRIAL FIBRILLATION: ICD-10-CM

## 2021-11-19 DIAGNOSIS — G62.9 POLYNEUROPATHY, UNSPECIFIED: ICD-10-CM

## 2021-11-19 DIAGNOSIS — E66.9 OBESITY, UNSPECIFIED: ICD-10-CM

## 2021-11-19 DIAGNOSIS — Z89.512 ACQUIRED ABSENCE OF LEFT LEG BELOW KNEE: ICD-10-CM

## 2021-11-19 DIAGNOSIS — J43.9 EMPHYSEMA, UNSPECIFIED: ICD-10-CM

## 2021-11-19 DIAGNOSIS — I50.32 CHRONIC DIASTOLIC (CONGESTIVE) HEART FAILURE: ICD-10-CM

## 2021-11-19 DIAGNOSIS — N17.9 ACUTE KIDNEY FAILURE, UNSPECIFIED: ICD-10-CM

## 2021-11-19 DIAGNOSIS — Z79.01 LONG TERM (CURRENT) USE OF ANTICOAGULANTS: ICD-10-CM

## 2021-11-19 DIAGNOSIS — Z16.24 RESISTANCE TO MULTIPLE ANTIBIOTICS: ICD-10-CM

## 2021-11-19 DIAGNOSIS — Z91.81 HISTORY OF FALLING: ICD-10-CM

## 2021-11-19 DIAGNOSIS — I11.0 HYPERTENSIVE HEART DISEASE WITH HEART FAILURE: ICD-10-CM

## 2021-11-19 DIAGNOSIS — R00.0 TACHYCARDIA, UNSPECIFIED: ICD-10-CM
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
## 2021-11-19 DIAGNOSIS — Z91.19 PATIENT'S NONCOMPLIANCE WITH OTHER MEDICAL TREATMENT AND REGIMEN: ICD-10-CM

## 2021-11-19 DIAGNOSIS — G47.33 OBSTRUCTIVE SLEEP APNEA (ADULT) (PEDIATRIC): ICD-10-CM

## 2021-11-19 DIAGNOSIS — F10.10 ALCOHOL ABUSE, UNCOMPLICATED: ICD-10-CM

## 2021-11-19 DIAGNOSIS — Z79.899 OTHER LONG TERM (CURRENT) DRUG THERAPY: ICD-10-CM

## 2021-11-19 DIAGNOSIS — J96.20 ACUTE AND CHRONIC RESPIRATORY FAILURE, UNSPECIFIED WHETHER WITH HYPOXIA OR HYPERCAPNIA: ICD-10-CM

## 2021-11-19 DIAGNOSIS — J15.6 PNEUMONIA DUE TO OTHER GRAM-NEGATIVE BACTERIA: ICD-10-CM

## 2021-11-19 DIAGNOSIS — Z86.73 PERSONAL HISTORY OF TRANSIENT ISCHEMIC ATTACK (TIA), AND CEREBRAL INFARCTION WITHOUT RESIDUAL DEFICITS: ICD-10-CM

## 2021-11-19 DIAGNOSIS — Z88.1 ALLERGY STATUS TO OTHER ANTIBIOTIC AGENTS STATUS: ICD-10-CM

## 2021-11-19 DIAGNOSIS — I95.9 HYPOTENSION, UNSPECIFIED: ICD-10-CM

## 2021-11-19 DIAGNOSIS — K74.60 UNSPECIFIED CIRRHOSIS OF LIVER: ICD-10-CM

## 2021-11-24 ENCOUNTER — APPOINTMENT (OUTPATIENT)
Dept: CARDIOLOGY | Facility: CLINIC | Age: 66
End: 2021-11-24

## 2021-11-26 ENCOUNTER — RX RENEWAL (OUTPATIENT)
Age: 66
End: 2021-11-26

## 2021-11-29 ENCOUNTER — RX RENEWAL (OUTPATIENT)
Age: 66
End: 2021-11-29

## 2021-12-14 ENCOUNTER — APPOINTMENT (OUTPATIENT)
Dept: FAMILY MEDICINE | Facility: CLINIC | Age: 66
End: 2021-12-14
Payer: MEDICARE

## 2021-12-14 ENCOUNTER — RX RENEWAL (OUTPATIENT)
Age: 66
End: 2021-12-14

## 2021-12-14 VITALS
HEIGHT: 72 IN | DIASTOLIC BLOOD PRESSURE: 78 MMHG | OXYGEN SATURATION: 95 % | WEIGHT: 305 LBS | HEART RATE: 84 BPM | SYSTOLIC BLOOD PRESSURE: 120 MMHG | BODY MASS INDEX: 41.31 KG/M2 | TEMPERATURE: 97.9 F

## 2021-12-14 DIAGNOSIS — K21.9 GASTRO-ESOPHAGEAL REFLUX DISEASE W/OUT ESOPHAGITIS: ICD-10-CM

## 2021-12-14 PROCEDURE — 99215 OFFICE O/P EST HI 40 MIN: CPT

## 2021-12-14 NOTE — HISTORY OF PRESENT ILLNESS
[Family Member] : family member [FreeTextEntry1] : Follow Up CHF/ COPD [de-identified] : STONE is a 66 year male here for CHF/COPD follow up. Patient reports he was in the hospital from 11/9/21-11/15/21 and was dx with pneumonia. Treated with Zithromax and Rocephin. Doing well since being discharged. \par \par Patient's daughter notes he has several bruises/scabs throughout his left hand since being discharged. Notes he had a Band-Aid on one of the blisters and when he took the Band-Aid off it peeled some of his skin off. States he noticed his hand becoming red and inflamed yesterday. \par \par Patient expresses he would like to start taking care of his own medications. Currently [facility] is administering his medications.

## 2021-12-14 NOTE — REVIEW OF SYSTEMS
Please advise patient that vaginal culture was positive for BV STD screen is negative and that an appropriate antibiotic has been called in   [Negative] : Heme/Lymph [de-identified] : HPI

## 2021-12-14 NOTE — ADDENDUM
[FreeTextEntry1] : I, Jennie Cooper acting as a scribe for Dr. Augusta Vasquez on Dec 14, 2021  at 4:43 PM\par

## 2021-12-14 NOTE — PLAN
[FreeTextEntry1] : \par - Adv to keep hand elevated, hand above heart\par - He is to notify office if sx worsen \par \par - Reviewed medication list with patient \par - Patient will c/w current medication regimen \par - Agreed with patient he is able to take self-manage his own medications. Reviewed several ways on how to keep track of medications. \par - RTC 1 month

## 2021-12-14 NOTE — END OF VISIT
[FreeTextEntry3] : Medical record entries made by the scribe today today, were at my direction and personally dictated to them by me, Dr. Augusta Vasquez on Dec 14, 2021. I have reviewed the chart and agree that the record accurately reflects my personal performance of the history, physical exam, assessment, and plan.\par  [Time Spent: ___ minutes] : I have spent [unfilled] minutes of time on the encounter.

## 2021-12-16 ENCOUNTER — RX RENEWAL (OUTPATIENT)
Age: 66
End: 2021-12-16

## 2021-12-16 RX ORDER — ACETAMINOPHEN 325 MG/1
325 TABLET ORAL
Qty: 360 | Refills: 4 | Status: ACTIVE | COMMUNITY
Start: 2021-06-26 | End: 1900-01-01

## 2021-12-21 RX ORDER — POLYETHYLENE GLYCOL 3350 17 G/17G
17 POWDER, FOR SOLUTION ORAL
Qty: 510 | Refills: 4 | Status: DISCONTINUED | COMMUNITY
Start: 2020-10-21 | End: 2021-12-21

## 2021-12-31 NOTE — CHART NOTE - NSCHARTNOTESELECT_GEN_ALL_CORE
CCM You were seen in the emergency room today for COVID symptoms. Please call your primary doctor to inform them of this ER visit and obtain the next available appointment within the next 3 days. As we discussed, return to the ER if you have any worsening symptoms.    We no longer feel that you need further emergency care or admission to the hospital at this time.    While we have determined that you are currently stable for discharge, we know that things can change. Please seek immediate medical attention or return to the ER if you experience any of the following:  Any worsening or persistent symptoms  Severe Pain  Chest Pain  Difficulty Breathing  Bleeding  Passing Out  Severe Rash  Inability to Eat or Drink  Persistent Fever    Please see a primary care doctor or specialist within 3 days to ensure that you are improving.    Please call the [facility] phone numbers on this document if you have any problems obtaining a follow up appointment.    I wish you well! -Dr Eid

## 2022-01-04 ENCOUNTER — APPOINTMENT (OUTPATIENT)
Dept: INTERNAL MEDICINE | Facility: CLINIC | Age: 67
End: 2022-01-04

## 2022-01-19 ENCOUNTER — APPOINTMENT (OUTPATIENT)
Dept: CARDIOLOGY | Facility: CLINIC | Age: 67
End: 2022-01-19

## 2022-01-27 ENCOUNTER — NON-APPOINTMENT (OUTPATIENT)
Age: 67
End: 2022-01-27

## 2022-01-27 ENCOUNTER — APPOINTMENT (OUTPATIENT)
Dept: CARDIOLOGY | Facility: CLINIC | Age: 67
End: 2022-01-27
Payer: MEDICARE

## 2022-01-27 VITALS
HEART RATE: 75 BPM | RESPIRATION RATE: 20 BRPM | BODY MASS INDEX: 42.66 KG/M2 | WEIGHT: 315 LBS | DIASTOLIC BLOOD PRESSURE: 63 MMHG | SYSTOLIC BLOOD PRESSURE: 121 MMHG | HEIGHT: 72 IN | OXYGEN SATURATION: 95 %

## 2022-01-27 PROCEDURE — 93000 ELECTROCARDIOGRAM COMPLETE: CPT

## 2022-01-27 PROCEDURE — 99215 OFFICE O/P EST HI 40 MIN: CPT

## 2022-01-27 NOTE — ASSESSMENT
[FreeTextEntry1] : A/P:\par \par *HFpEF\par -chronic dyspnea unchanged since last visit.\par -likely multifactorial (noncompliance w/ CPAP, morbid obesity w/ \par restrictive physiology on PFTs)\par -euvolemic today - cont. lasix at 80mg BID & moustapha 25 daily\par -literature on low salt diet given.\par \par *afib-persistent/permanent\par *s/p CVA-per MRI study '17-"mild chronic microvascular changes"\par *s/p watchman Apr '19-due to fall risk\par -cont. BB, CaB for rate control.\par -cont. ASA as reccomended post watcham implant\par by EP implanting physician Dr. Clark (see may '19 note in [facility]).\par \par *HTN\par -controlled\par \par *obesity\par -literature on weight loss given.\par \par Return 3-4 months. \par

## 2022-01-27 NOTE — HISTORY OF PRESENT ILLNESS
[FreeTextEntry1] : 67 y/o w/\par \par *HFpEF\par *afib-permanent\par *s/p CVA-per MRI study '17-"mild chronic microvascular changes"\par *s/p watchman Apr '19-due to fall risk\par *HTN\par *Obesity-BMI=40\par *VIJAY noncompliant w/ CPAP\par *ETOHism\par *recurrent falls s/p BKA\par \par since last visit admitted to  Nov '212\par pneumonia, also had afib w/ RVR\par but this was related to septic state.\par Discharged after a few days.\par \par No new cardiac symptoms: \par no chest pain, dyspnea, palpitations, syncope, lightheadness, LE edema.\par Not using CPAP machine or noctural O2 as reccomneded by pulmonary\par encouraged him to do this.\par Long discussion regarding high salt diet & weight loss.\par \par *ECG Jun '21 afib rate controlled\par *MYA, TTE May '20: 55-60% mod TR, MR neg for veg,\par mild LVH mod LAE & MYRA PASP 30\par *Nuclear May '18 normal perfusion EF 60%\par *CT w/o coronaries (for watchman device) Mar '19: "cardiomegaly"\par  "triple vessel coronary calcification", prominent in distal LM & LAD.\par *Nuclear-lexiscan Oct '21: normal perfusion, normal EF.\par *TTE 10/29/18 shows low-normal LV function, mild MS, mod-sev MR, sev LAE, sev TR, sev pHTN. \par *MYA 05/2019 EF 60-65%, mod MR/TR, mod-sev pHTN, stable Watchman device with trace jose-device leak\par \par *MRI Nov '17 "scatterred white matter hyperintensities likely representing\par mild chronic microvascular changes"\par *Feb '21 Hb 12 plat 74 Cr 0.80 TSH WNL LFTs WNL\par BNP Dec '19 693, Dec '18 766 Oct '18 1240\par \par \par

## 2022-03-08 ENCOUNTER — APPOINTMENT (OUTPATIENT)
Dept: FAMILY MEDICINE | Facility: CLINIC | Age: 67
End: 2022-03-08

## 2022-03-09 ENCOUNTER — TRANSCRIPTION ENCOUNTER (OUTPATIENT)
Age: 67
End: 2022-03-09

## 2022-03-10 ENCOUNTER — NON-APPOINTMENT (OUTPATIENT)
Age: 67
End: 2022-03-10

## 2022-03-15 ENCOUNTER — APPOINTMENT (OUTPATIENT)
Dept: FAMILY MEDICINE | Facility: CLINIC | Age: 67
End: 2022-03-15
Payer: MEDICARE

## 2022-03-15 VITALS
TEMPERATURE: 97.8 F | DIASTOLIC BLOOD PRESSURE: 66 MMHG | SYSTOLIC BLOOD PRESSURE: 124 MMHG | BODY MASS INDEX: 42.66 KG/M2 | HEIGHT: 72 IN | WEIGHT: 315 LBS | OXYGEN SATURATION: 90 % | HEART RATE: 73 BPM

## 2022-03-15 DIAGNOSIS — Z00.00 ENCOUNTER FOR GENERAL ADULT MEDICAL EXAMINATION W/OUT ABNORMAL FINDINGS: ICD-10-CM

## 2022-03-15 PROCEDURE — 99396 PREV VISIT EST AGE 40-64: CPT

## 2022-03-24 ENCOUNTER — NON-APPOINTMENT (OUTPATIENT)
Age: 67
End: 2022-03-24

## 2022-03-24 ENCOUNTER — APPOINTMENT (OUTPATIENT)
Dept: INTERNAL MEDICINE | Facility: CLINIC | Age: 67
End: 2022-03-24
Payer: MEDICARE

## 2022-03-24 VITALS
DIASTOLIC BLOOD PRESSURE: 60 MMHG | BODY MASS INDEX: 39.82 KG/M2 | SYSTOLIC BLOOD PRESSURE: 140 MMHG | HEART RATE: 82 BPM | RESPIRATION RATE: 20 BRPM | WEIGHT: 294 LBS | TEMPERATURE: 97.9 F | OXYGEN SATURATION: 88 % | HEIGHT: 72 IN

## 2022-03-24 PROCEDURE — 94010 BREATHING CAPACITY TEST: CPT

## 2022-03-24 PROCEDURE — 99214 OFFICE O/P EST MOD 30 MIN: CPT | Mod: 25

## 2022-03-28 PROBLEM — Z00.00 ENCOUNTER FOR PREVENTIVE HEALTH EXAMINATION: Status: ACTIVE | Noted: 2017-01-05

## 2022-03-28 NOTE — PHYSICAL EXAM
[No Acute Distress] : no acute distress [Well Nourished] : well nourished [Well Developed] : well developed [Well-Appearing] : well-appearing [Normal Sclera/Conjunctiva] : normal sclera/conjunctiva [PERRL] : pupils equal round and reactive to light [EOMI] : extraocular movements intact [Normal Outer Ear/Nose] : the outer ears and nose were normal in appearance [Normal Oropharynx] : the oropharynx was normal [No Lymphadenopathy] : no lymphadenopathy [Supple] : supple [No Respiratory Distress] : no respiratory distress  [No Accessory Muscle Use] : no accessory muscle use [Clear to Auscultation] : lungs were clear to auscultation bilaterally [Normal Rate] : normal rate  [Regular Rhythm] : with a regular rhythm [Normal S1, S2] : normal S1 and S2 [No Edema] : there was no peripheral edema [Soft] : abdomen soft [Non Tender] : non-tender [Non-distended] : non-distended [No Masses] : no abdominal mass palpated [Normal Posterior Cervical Nodes] : no posterior cervical lymphadenopathy [Normal Anterior Cervical Nodes] : no anterior cervical lymphadenopathy [No Rash] : no rash [Coordination Grossly Intact] : coordination grossly intact [No Focal Deficits] : no focal deficits [Normal Affect] : the affect was normal [Normal Insight/Judgement] : insight and judgment were intact [de-identified] : distant breath sounds 2/2 COPD  [de-identified] : + LLE amputation, [de-identified] : +RLE wound

## 2022-03-28 NOTE — ASSESSMENT
[FreeTextEntry1] : 65yo M presents for CPE and increased coughing and sputum production\par - Pt had CXR showed possible PNA. After discussing with patient and daughter, they believe the wound nurse was concerned about him due to his baseline SOB. Pt presents today with no worsening symptoms from baseline other than coughing and sputum production. Daughter believes this is his baseline however. Pt was already treated with 10 days of doxycycline for LE wound. Pt is at his normal baseline today.

## 2022-03-28 NOTE — HISTORY OF PRESENT ILLNESS
[Other: _____] : [unfilled] [FreeTextEntry8] : 67yo M presents for CPE. Pt is here today with his daughter. Pt states he has coughing and an increase in sputum production. Pt is SOB at baseline and states the wound nurse was concerned and thought he had PNA. Pt was treated for LE wound with doxycycline x 10 days. Discussed cross coverage for possible pneumonia. Pt states he is doing better today, no increased SOB, ALANIZ, CP or palpitations. Pt LE wound is healing well and is being followed by wound care and is having dressing changes done by wound care. Daughter/patient unsure if he needs to be on oxygen 24/7 or only at night, directions from pulmonology unclear to daughter. \par Pt reports he is doing better today than he was a few days ago. \par Pt denies any headaches, sore throat, fevers, chills, or dyspnea. \par \par Daughter is interested in bringing patient with her and family to [place]. Discussed being monitored closely prior to leaving for trip.

## 2022-03-28 NOTE — REVIEW OF SYSTEMS
[Shortness Of Breath] : shortness of breath [Cough] : cough [Dyspnea on Exertion] : dyspnea on exertion [Negative] : Genitourinary [Wheezing] : no wheezing [Headache] : no headache [Dizziness] : no dizziness [Fainting] : no fainting [Confusion] : no confusion [Memory Loss] : no memory loss [Insomnia] : no insomnia [Swollen Glands] : no swollen glands [FreeTextEntry6] : baseline SOB and ALANIZ [FreeTextEntry9] : baseline joint pain and muscle weakness, not worsening  [de-identified] : +wound on right leg

## 2022-03-28 NOTE — PLAN
[FreeTextEntry1] : -Follow up/call office if symptoms worsen, SOB, fevers, chills, cough, congestion \par -Follow up with pulmonology about whether pt needs to be on constant oxygen or only at night. \par -Follow up in prior to trip to [place]

## 2022-03-28 NOTE — HEALTH RISK ASSESSMENT
used [No] : No [One fall no injury in past year] : Patient reported one fall in the past year without injury [Assistive Device] : Patient uses an assistive device

## 2022-04-04 ENCOUNTER — RX RENEWAL (OUTPATIENT)
Age: 67
End: 2022-04-04

## 2022-04-21 ENCOUNTER — RX RENEWAL (OUTPATIENT)
Age: 67
End: 2022-04-21

## 2022-04-28 ENCOUNTER — RX RENEWAL (OUTPATIENT)
Age: 67
End: 2022-04-28

## 2022-05-09 ENCOUNTER — RX RENEWAL (OUTPATIENT)
Age: 67
End: 2022-05-09

## 2022-05-20 ENCOUNTER — RX RENEWAL (OUTPATIENT)
Age: 67
End: 2022-05-20

## 2022-05-24 NOTE — SWALLOW BEDSIDE ASSESSMENT ADULT - NS SPL SWALLOW CLINIC TRIAL FT
Clinic Care Coordination Contact    Follow Up Progress Note      Assessment:  The pt was recently at the ED, I called to check up on the pt and help the pt setup a ED follow up. The pt was at [facility] for abdominal pain. I called and talked to the pt, pt stated that she was feeling better, but today she threw up. Pt did want a follow up. I was able to setup for the pt to come in on 05/27/2022 at 2:40pm with .    Care Gaps:    Health Maintenance Due   Topic Date Due     ADVANCE CARE PLANNING  Never done     COVID-19 Vaccine (1) Never done     PHQ-9  12/10/2019     PREVENTIVE CARE VISIT  06/03/2020     PAP  06/03/2022           Goals addressed this encounter:    Goals Addressed    None         Intervention/Education provided during outreach:               Plan:     Care Coordinator will follow up in month   Solids and liquids were not offered given dysphagic profile. Odynophagia was denied. Effects of acute medical deconditioning are contributory to his feeding compromise/Dysphagia(i.e PNA with MSSA bacteremia, rapid A-Fib, COPD exacerbation with respiratory failure warranting transient mechanical ventilation/tracheostomy placement with laryngeal drag effect, ETOH withdrawal). Solids and liquids were not offered given dysphagic profile. Odynophagia was denied. Effects of acute medical deconditioning are contributory to his feeding compromise/Dysphagia(i.e PNA with MSSA bacteremia, rapid A-Fib, COPD exacerbation with respiratory failure warranting transient mechanical ventilation/tracheostomy placement with laryngeal drag effect, ETOH withdrawal). Note that oropharyngeal swallow fatigue, O2 desaturation and swallow fatigue evident upon therapy attempts which are already limited by his encephalopathy.

## 2022-05-26 NOTE — PROGRESS NOTE ADULT - PROBLEM SELECTOR PROBLEM 2
Estella Briseno (:  1966) is a 50 y.o. female,Established patient, here for evaluation of the following chief complaint(s):  Medication Refill      ASSESSMENT/PLAN:  1. Routine general medical examination at a health care facility  Assessment & Plan:  Fasting labs ordered. Educated patient on modifiable-non-modifiable risk factors concerning family cardiac history. Orders:  -     CBC; Future  -     Comprehensive Metabolic Panel; Future  -     Lipid, Fasting; Future  -     TSH with Reflex; Future  -     Hepatitis C Antibody; Future  -     Hemoglobin A1C; Future  -     HIV Screen; Future  -     Vitamin D 25 Hydroxy; Future  -     Follicle Stimulating Hormone; Future  -     Estrogens, Fractionated; Future  -     Luteinizing Hormone; Future  -     Prolactin; Future  -     Cate Torre MD, Gastroenterology, [facility]  2. Premature menopause  Assessment & Plan:  Hormone labs ordered. Advised patient she is due for her mammogram. Briefly talked with patient about Lexapro for menopausal symptoms. Patient would like to see what her labs look like and think about it. Orders:  -     Follicle Stimulating Hormone; Future  -     Estrogens, Fractionated; Future  -     Luteinizing Hormone; Future  -     Prolactin; Future  3. Encounter for screening mammogram for malignant neoplasm of breast  -     MIMI DIGITAL SCREEN W OR WO CAD BILATERAL; Future  4. Colon cancer screening  -     hydrOXYzine (ATARAX) 50 MG tablet; TAKE 1 TABLET BY MOUTH FOUR TIMES DAILY AS NEEDED FOR ANXIETY, Disp-120 tablet, R-0Normal  -     AFL - Tahir Moreau MD, Gastroenterology, [facility]  5. Anxiety  Assessment & Plan:   Continue Hydroxyzine. Refill sent in. No follow-ups on file. SUBJECTIVE/OBJECTIVE:  HPI  Patient in the office today with complaints of night sweats, no energy, with her nerves all over the place for the last year. She is not sure if she is going through menopause.  She had a partial hysterectomy in 2017. She would like to have her hormone levels checked. She is also concerned with her family heart history. Her mom and all her uncles  from massive heart attacks. Her mother  of MI at age 61. Patient's brother also  of massive heart attack at 64. She denies any reports of cardiac symptoms including chest pain, shortness of breath, leg swelling, headaches, blurred vision  Patient quit smoking after she had Covid. Current Outpatient Medications   Medication Sig Dispense Refill    hydrOXYzine (ATARAX) 50 MG tablet TAKE 1 TABLET BY MOUTH FOUR TIMES DAILY AS NEEDED FOR ANXIETY 120 tablet 0    meloxicam (MOBIC) 7.5 MG tablet TAKE 1 TABLET BY MOUTH DAILY 30 tablet 0    famotidine (PEPCID AC MAXIMUM STRENGTH) 20 MG tablet Take 20 mg by mouth 2 times daily      Probiotic Product (PROBIOTIC-10) CHEW Take by mouth      MULTIPLE VITAMIN PO Take by mouth      cetirizine (ZYRTEC) 10 MG tablet Take 10 mg by mouth daily. No current facility-administered medications for this visit. Review of Systems   Constitutional: Negative for chills, fatigue and fever. Night sweats   HENT: Negative for congestion, ear pain, postnasal drip, rhinorrhea, sinus pressure, sneezing and sore throat. Eyes: Negative for redness and itching. Respiratory: Negative for cough, chest tightness, shortness of breath and wheezing. Cardiovascular: Negative for chest pain and palpitations. Gastrointestinal: Negative for abdominal pain, blood in stool, constipation, diarrhea, nausea and vomiting. Endocrine: Negative for cold intolerance and heat intolerance. Genitourinary: Negative for difficulty urinating, dysuria, flank pain, frequency, hematuria and urgency. Musculoskeletal: Negative for arthralgias, back pain, joint swelling and myalgias. Skin: Negative for color change, pallor, rash and wound.    Allergic/Immunologic: Negative for environmental allergies and food allergies. Neurological: Negative for dizziness, seizures, syncope, weakness, light-headedness, numbness and headaches. Hematological: Negative for adenopathy. Does not bruise/bleed easily. Psychiatric/Behavioral: Negative for confusion, sleep disturbance and suicidal ideas. The patient is nervous/anxious. The patient is not hyperactive. Vitals:    05/26/22 1023   BP: 110/70   Pulse: 79   Temp: 97.3 °F (36.3 °C)   SpO2: 97%   Weight: 169 lb (76.7 kg)   Height: 5' 5\" (1.651 m)       Physical Exam  Constitutional:       Appearance: Normal appearance. She is well-developed. HENT:      Head: Normocephalic and atraumatic. Right Ear: Hearing normal.      Left Ear: Hearing normal.      Nose: No mucosal edema. Right Sinus: No maxillary sinus tenderness or frontal sinus tenderness. Left Sinus: No maxillary sinus tenderness or frontal sinus tenderness. Mouth/Throat: Tonsils: No tonsillar abscesses. Eyes:      Extraocular Movements: Extraocular movements intact. Pupils: Pupils are equal, round, and reactive to light. Cardiovascular:      Rate and Rhythm: Normal rate and regular rhythm. Pulses: Normal pulses. Heart sounds: Normal heart sounds. Pulmonary:      Effort: Pulmonary effort is normal.      Breath sounds: Normal breath sounds. Lymphadenopathy:      Head:      Right side of head: No submental, submandibular, tonsillar, preauricular, posterior auricular or occipital adenopathy. Left side of head: No submental, submandibular, tonsillar, preauricular, posterior auricular or occipital adenopathy. Skin:     General: Skin is warm and dry. Neurological:      Mental Status: She is alert and oriented to person, place, and time. Psychiatric:         Mood and Affect: Mood normal.         Behavior: Behavior normal.                 An electronic signature was used to authenticate this note.     --Linda Pineda, APRN - CNP Delirium tremens

## 2022-06-15 ENCOUNTER — RX RENEWAL (OUTPATIENT)
Age: 67
End: 2022-06-15

## 2022-06-16 ENCOUNTER — RX RENEWAL (OUTPATIENT)
Age: 67
End: 2022-06-16

## 2022-06-17 ENCOUNTER — APPOINTMENT (OUTPATIENT)
Dept: CARDIOLOGY | Facility: CLINIC | Age: 67
End: 2022-06-17
Payer: MEDICARE

## 2022-06-17 ENCOUNTER — NON-APPOINTMENT (OUTPATIENT)
Age: 67
End: 2022-06-17

## 2022-06-17 VITALS
DIASTOLIC BLOOD PRESSURE: 72 MMHG | OXYGEN SATURATION: 93 % | SYSTOLIC BLOOD PRESSURE: 112 MMHG | HEART RATE: 87 BPM | BODY MASS INDEX: 39.82 KG/M2 | WEIGHT: 294 LBS | HEIGHT: 72 IN

## 2022-06-17 PROCEDURE — 93000 ELECTROCARDIOGRAM COMPLETE: CPT

## 2022-06-17 PROCEDURE — 99214 OFFICE O/P EST MOD 30 MIN: CPT

## 2022-06-17 NOTE — ASSESSMENT
[FreeTextEntry1] : HFpEF\par Stable appears euvolemic \par likely multifactorial (noncompliance w/ CPAP, morbid obesity w/ \par restrictive physiology on PFTs)\par Advised daily weight\par Weight loss\par Low sodium diet \par F/U Echo \par Labs ordered \par \par AF: \par Rate controlled \par S/P watchman device Apr '19-due to fall risk\par Continue ASA \par \par HTN:\par controlled\par \par VIJAY: Continues to refuse treatment \par Advised weight loss \par \par Return 3 months \par

## 2022-06-17 NOTE — PHYSICAL EXAM
[Soft] : abdomen soft [Non Tender] : non-tender [Normal] : moves all extremities, no focal deficits, normal speech [Alert and Oriented] : alert and oriented [de-identified] : obese  [de-identified] : Irreg [de-identified] : +1 RLE Edema

## 2022-06-17 NOTE — HISTORY OF PRESENT ILLNESS
[FreeTextEntry1] : This is a 66 Y/O male PMH: HFpEF, AF S/P watchman, HTN, CVA per MRI study '17-"mild chronic microvascular changes, VIJAY non compliant, Obesity, Pneumonia here today in routine cardiac follow up\par \par Claims to be feeling well no CP/SOB/Palpitations weight stable\par Wishes to travel to [place] for his daughters wedding \par \par \par MYA, TTE May '20: 55-60% mod TR, MR neg for veg,\par mild LVH mod LAE & MYRA PASP 30\par Nuclear May '18 normal perfusion EF 60%\par CT w/o coronaries (for watchman device) Mar '19: cardiomegaly\par triple vessel coronary calcification, prominent in distal LM & LAD.\par Nuclear-lexiscan Oct '21: normal perfusion, normal EF.\par TTE 10/29/18 shows low-normal LV function, mild MS, mod-sev MR, sev LAE, sev TR, sev pHTN. \par MYA 05/2019 EF 60-65%, mod MR/TR, mod-sev pHTN, stable Watchman device with trace jose-device leak\par MRI Nov '17 "scatterred white matter hyperintensities likely representing\par mild chronic microvascular changes\par Labs with PCP\par \par \par \par

## 2022-06-27 ENCOUNTER — RX RENEWAL (OUTPATIENT)
Age: 67
End: 2022-06-27

## 2022-07-01 NOTE — PATIENT PROFILE ADULT - NSTRANSFERBELONGINGSRESP_GEN_A_NUR
Health Maintenance Due   Topic Date Due   â¢ COVID-19 Vaccine (1) Never done   â¢ HPV Vaccine (1 - Male 2-dose series) Never done   â¢ Depression Screening  Never done   â¢ Annual Physical (ages 2-20)  08/01/2019       Patient is due for topics as listed above but is not proceeding with Immunization(s) COVID-19 and HPV at this time.  Will discuss yes

## 2022-07-05 ENCOUNTER — RX RENEWAL (OUTPATIENT)
Age: 67
End: 2022-07-05

## 2022-07-12 ENCOUNTER — APPOINTMENT (OUTPATIENT)
Dept: FAMILY MEDICINE | Facility: CLINIC | Age: 67
End: 2022-07-12

## 2022-07-12 VITALS
DIASTOLIC BLOOD PRESSURE: 76 MMHG | HEART RATE: 61 BPM | TEMPERATURE: 97.7 F | BODY MASS INDEX: 39.82 KG/M2 | HEIGHT: 72 IN | OXYGEN SATURATION: 90 % | SYSTOLIC BLOOD PRESSURE: 102 MMHG | WEIGHT: 294 LBS

## 2022-07-12 DIAGNOSIS — F41.9 ANXIETY DISORDER, UNSPECIFIED: ICD-10-CM

## 2022-07-12 DIAGNOSIS — E53.8 DEFICIENCY OF OTHER SPECIFIED B GROUP VITAMINS: ICD-10-CM

## 2022-07-12 PROCEDURE — 96372 THER/PROPH/DIAG INJ SC/IM: CPT

## 2022-07-12 PROCEDURE — 99214 OFFICE O/P EST MOD 30 MIN: CPT | Mod: 25

## 2022-07-12 RX ORDER — DOXYCYCLINE 100 MG/1
100 TABLET, FILM COATED ORAL
Qty: 14 | Refills: 0 | Status: COMPLETED | COMMUNITY
Start: 2022-04-18 | End: 2022-07-12

## 2022-07-12 RX ORDER — CYANOCOBALAMIN 1000 UG/ML
1000 INJECTION INTRAMUSCULAR; SUBCUTANEOUS
Qty: 0 | Refills: 0 | Status: COMPLETED | OUTPATIENT
Start: 2022-07-12

## 2022-07-12 RX ADMIN — CYANOCOBALAMIN 1 MCG/ML: 1000 INJECTION INTRAMUSCULAR; SUBCUTANEOUS at 00:00

## 2022-07-17 NOTE — ADDENDUM
[FreeTextEntry1] : I, Deidre Ponce acting as a scribe for Dr. Augusta Vasquez on July 12, 2022.\par

## 2022-07-17 NOTE — ASSESSMENT
[FreeTextEntry1] : # Health Maintenance\par - Vitamin B 12 injection administered today\par - Bw script provided today\par - Pt to have Vitamin B 12 levels checked in 1 month \par \par # Anxiety\par - Started on Alprazolam 0.5 MG \par - Pt to take a maximum of 2 tablets per plane ride
Warm

## 2022-07-17 NOTE — HISTORY OF PRESENT ILLNESS
[FreeTextEntry1] : Follow up CHF/COPD [de-identified] : Sona is a 67 year male here today for f/u on CHF/COPD. He was accompanied by his daughter and denied any acute complaints today. Pt stated he compliantly takes Doxazosin Mesylate 2 MG, Quetiapine Fumarate 50 MG and Diltiazem HCl  MG. Pt confirmed he plans on going to [place] with his family this summer. He inquired about Alprazolam for his upcoming plane ride. \par \par Pt daughter stated pt cellulitis in his RT leg has improved. Pt confirmed he had seen cardio and was cleared for travel. Pt was advised by cardio to wear compression sleeve during plane trip.

## 2022-07-17 NOTE — PLAN
[FreeTextEntry1] : - Started on Alprazolam 0.5 MG\par - Pt is to have only 2 MAX tablets of Alprazolam per plane ride \par - Script provided for: Bw outside of office \par - Pt to have Vitamin B 12 levels checked in 1 month \par - Vitamin B 12 injection administered today\par - Script provided for: Bw outside of office \par - F/u as needed

## 2022-07-17 NOTE — END OF VISIT
[FreeTextEntry3] : Medical record entries made by the scribe today today, were at my direction and personally dictated to them by me, Dr. Augusta Vasquez on July 12, 2022. I have reviewed the chart and agree that the record accurately reflects my personal performance of the history, physical exam, assessment, and plan.\par

## 2022-07-29 NOTE — ED PROVIDER NOTE - OBJECTIVE STATEMENT
----- Message from Garcia Parker NP sent at 7/29/2022  7:19 AM EDT -----  Please call, urine test was normal.
Pt. present to ED with in moderate respiratory distress. Pt. c/o acute SOB for the past few hours prior to arriving in the ED. Pt. states that earlier in the day, he experienced large amount of rectal bleeding. Pt. was hypotensive and tachycardia as per EMS. Pt. has hx of A-fib/HTN/BPH. Pt. is on ASA/Plavix and Xarelto. Pt. recently had a watchman procedure performed a few weeks ago. Pt. in the ED denies any chest pain. Pt. in the ED is hypotensive and pale appearing and tachypneic. NO abdominal pain. NO active rectal bleeding.

## 2022-08-11 NOTE — PROGRESS NOTE ADULT - PROBLEM SELECTOR PLAN 5
Acute worsening and now mechanically ventilated; optimization as per critical care.
Acute worsening and now mechanically ventilated; optimization as per critical care.
Extubated.
Extubated.
Extubated. as per pulmonary
Mechanically ventilated; optimization as per critical care.
Reintubated this AM. Management per pulmonary.
04-May-2022

## 2022-08-13 ENCOUNTER — TRANSCRIPTION ENCOUNTER (OUTPATIENT)
Age: 67
End: 2022-08-13

## 2022-08-17 ENCOUNTER — LABORATORY RESULT (OUTPATIENT)
Age: 67
End: 2022-08-17

## 2022-08-23 ENCOUNTER — APPOINTMENT (OUTPATIENT)
Dept: FAMILY MEDICINE | Facility: CLINIC | Age: 67
End: 2022-08-23

## 2022-08-23 DIAGNOSIS — I87.2 VENOUS INSUFFICIENCY (CHRONIC) (PERIPHERAL): ICD-10-CM

## 2022-08-23 PROCEDURE — 99213 OFFICE O/P EST LOW 20 MIN: CPT | Mod: 95

## 2022-08-23 NOTE — ASSESSMENT
[FreeTextEntry1] : # RT Lower Extremity  Wound\par -  Erythema below the knee\par -  No significant warmth to touch per daughter\par -  multiple nonhealing excoriations on lower leg\par \par

## 2022-08-23 NOTE — PHYSICAL EXAM
[No Acute Distress] : no acute distress [Well Nourished] : well nourished [Well Developed] : well developed [Well-Appearing] : well-appearing [No Rash] : no rash [Coordination Grossly Intact] : coordination grossly intact [No Focal Deficits] : no focal deficits [Normal Gait] : normal gait [Deep Tendon Reflexes (DTR)] : deep tendon reflexes were 2+ and symmetric [Normal Affect] : the affect was normal [Normal Insight/Judgement] : insight and judgment were intact [de-identified] : Erythematous below knee, multiple nonhealing excoriations

## 2022-08-23 NOTE — PLAN
[FreeTextEntry1] : - Pt to continue treatment with wound care\par - Enc to let wound air out when home\par - Adv to wrap wound if patient feels urge to scratch or leaves house\par - F/u as per needed

## 2022-08-23 NOTE — HISTORY OF PRESENT ILLNESS
[Home] : at home, [unfilled] , at the time of the visit. [Medical Office: ([facility])___] : at the medical office located in  [Family Member] : family member [Verbal consent obtained from patient] : the patient, [unfilled] [FreeTextEntry8] : Sona is a 67 year male here today c/o open sores on RT leg wound for a few weeks. The patient was accompanied by his daughter in today's visit. He denied taking any antibiotics since onset of condition. Patient's daughter denied a hot temperature when touching the affected area. Pt denied scratching his RT leg at all. He stated he was told to use a wrap with gauze for about 3 days as per wound care. Pt noted he will be seeing wound care today. He inquired about unwrapping his wound and letting it air out. \par \par \par Pt confirmed he had recently returned from a trip to [place].

## 2022-08-23 NOTE — END OF VISIT
[Time Spent: ___ minutes] : I have spent [unfilled] minutes of time on the encounter. [FreeTextEntry3] : Medical record entries made by the scribe today today, were at my direction and personally dictated to them by me, Dr. Augusta Vasquez on August 23, 2022. I have reviewed the chart and agree that the record accurately reflects my personal performance of the history, physical exam, assessment, and plan.\par

## 2022-08-23 NOTE — ADDENDUM
[FreeTextEntry1] : I, Deidre Ponce acting as a scribe for Dr. Augusta Vasquez on August 23, 2022.\par

## 2022-09-02 NOTE — PHYSICAL EXAM
[No Acute Distress] : no acute distress [Well Nourished] : well nourished [Well Developed] : well developed [Well-Appearing] : well-appearing [Normal Sclera/Conjunctiva] : normal sclera/conjunctiva [PERRL] : pupils equal round and reactive to light [EOMI] : extraocular movements intact [Normal Outer Ear/Nose] : the outer ears and nose were normal in appearance [Normal Oropharynx] : the oropharynx was normal [No JVD] : no jugular venous distention [No Lymphadenopathy] : no lymphadenopathy [Supple] : supple [Thyroid Normal, No Nodules] : the thyroid was normal and there were no nodules present [No Respiratory Distress] : no respiratory distress  [No Accessory Muscle Use] : no accessory muscle use [Clear to Auscultation] : lungs were clear to auscultation bilaterally [Normal Rate] : normal rate  [Regular Rhythm] : with a regular rhythm [Normal S1, S2] : normal S1 and S2 [No Murmur] : no murmur heard [No Carotid Bruits] : no carotid bruits [No Abdominal Bruit] : a ~M bruit was not heard ~T in the abdomen 2 [No Varicosities] : no varicosities [Pedal Pulses Present] : the pedal pulses are present [No Edema] : there was no peripheral edema [No Palpable Aorta] : no palpable aorta [No Extremity Clubbing/Cyanosis] : no extremity clubbing/cyanosis [Soft] : abdomen soft [Non Tender] : non-tender [Non-distended] : non-distended [No Masses] : no abdominal mass palpated [No HSM] : no HSM [Normal Bowel Sounds] : normal bowel sounds [Normal Posterior Cervical Nodes] : no posterior cervical lymphadenopathy [Normal Anterior Cervical Nodes] : no anterior cervical lymphadenopathy [No CVA Tenderness] : no CVA  tenderness [No Spinal Tenderness] : no spinal tenderness [No Joint Swelling] : no joint swelling [Grossly Normal Strength/Tone] : grossly normal strength/tone [No Rash] : no rash [Coordination Grossly Intact] : coordination grossly intact [No Focal Deficits] : no focal deficits [Normal Gait] : normal gait [Deep Tendon Reflexes (DTR)] : deep tendon reflexes were 2+ and symmetric [Normal Affect] : the affect was normal [Normal Insight/Judgement] : insight and judgment were intact

## 2022-09-02 NOTE — PATIENT PROFILE ADULT - HARM RISK FACTORS
----- Message from Ronald Ferguson RN sent at 9/2/2022  9:47 AM EDT -----  Regarding: FW: echo results  Please call and schedule this pt for a TAVR evaluation. Thanks,  Dennis Rudolph RN  ----- Message -----  From: Steve Herman MD  Sent: 9/1/2022   4:27 PM EDT  To: Osvaldo Wagner MD, Ronald Ferguson RN  Subject: RE: echo results                                 I spoke to him today. Please have him see Dr. Samra Beltran for TAVR evaluation. Thanks    ----- Message -----  From: Joceline Morfin RN  Sent: 9/1/2022   2:31 PM EDT  To: Brent Ortiz MD  Subject: echo results                                     Pt wants you to explain his Echo results. I will refer to Samra Beltran. After you talk to him.   Dennis Rudolph Identifiers x 2. Appt scheduled.     Future Appointments   Date Time Provider Aaliyah [facility]   9/30/2022  3:00 PM Nathan Pat MD [facility]   2/22/2023  2:40 PM Adali Palumbo MD [facility] Patient called for explanation regarding his test results.       Please advise            TDF:[phone] Universal Safety Interventions yes

## 2022-09-12 ENCOUNTER — RX RENEWAL (OUTPATIENT)
Age: 67
End: 2022-09-12

## 2022-09-16 ENCOUNTER — APPOINTMENT (OUTPATIENT)
Dept: CARDIOLOGY | Facility: CLINIC | Age: 67
End: 2022-09-16

## 2022-09-16 VITALS
DIASTOLIC BLOOD PRESSURE: 56 MMHG | HEIGHT: 72 IN | WEIGHT: 304.23 LBS | SYSTOLIC BLOOD PRESSURE: 108 MMHG | HEART RATE: 74 BPM | OXYGEN SATURATION: 93 % | BODY MASS INDEX: 41.21 KG/M2

## 2022-09-16 PROCEDURE — 93000 ELECTROCARDIOGRAM COMPLETE: CPT

## 2022-09-16 PROCEDURE — 99214 OFFICE O/P EST MOD 30 MIN: CPT | Mod: 25

## 2022-09-16 NOTE — HISTORY OF PRESENT ILLNESS
[FreeTextEntry1] : 65 y/o w/\par \par *HFpEF\par *afib-permanent\par *s/p CVA-per MRI study '17-"mild chronic microvascular changes"\par *s/p watchman Apr '19-due to fall risk\par *HTN\par *Obesity-BMI=40\par *VIJAY noncompliant w/ CPAP\par *ETOHism\par *recurrent falls s/p BKA\par \par here for followup.\par Reviewed echo report & images: moderate eccentric MR (no severe MR signs present)\par normal EF, severe LA (h/o permanent Afib).  Moderate pulmonary hypertension 50 mmHg.\par \par No  new cardiac symptoms: no chest pain, palpitations, syncope, lightheadness.\par Does ADLs w/o difficulty.\par Pulmonary reccomends 24 hr O2 as resting O2 sats in office 88%.\par Pt refuses this.  Of note, O2 sats at rest today 92%.\par Still refusing CPAP for VIJAY\par Labs ordered during Jun '22 comp CBC, lipids, tSH these were not done.\par Reviewed labs: Jun '22 FLP  HDL 36 TG 83  LFts WNL Cr 0.85\par plat 132 Hb 12 Nov '21 BNP 3031, 4176\par \par *ECG Jun '21 afib rate controlled\par *MYA, TTE May '20: 55-60% mod TR, MR neg for veg,\par mild LVH mod LAE & MYRA PASP 30\par *Nuclear May '18 normal perfusion EF 60%\par *CT w/o coronaries (for watchman device) Mar '19: "cardiomegaly"\par  "triple vessel coronary calcification", prominent in distal LM & LAD.\par *Nuclear-lexiscan Oct '21: normal perfusion, normal EF.\par *TTE 10/29/18 shows low-normal LV function, mild MS, mod-sev MR, sev LAE, sev TR, sev pHTN. \par *MYA 05/2019 EF 60-65%, mod MR/TR, mod-sev pHTN, stable Watchman device with trace jose-device leak\par \par *MRI Nov '17 "scatterred white matter hyperintensities likely representing\par mild chronic microvascular changes"\par *Jun '22 FLP  HDL 36 TG 83  LFts WNL Cr 0.85\par plat 132 Hb 12 Nov '21 BNP 3031, 4176\par *Feb '21 Hb 12 plat 74 Cr 0.80 TSH WNL LFTs WNL\par BNP Dec '19 693, Dec '18 766 Oct '18 1240

## 2022-09-16 NOTE — ASSESSMENT
[FreeTextEntry1] : A/P:\par \par *MR\par -moderate on echo\par -next echo Sep '23.\par \par *HFpEF\par -chronic dyspnea unchanged since last visit.\par -likely multifactorial (noncompliance w/ CPAP, morbid obesity w/ \par restrictive physiology on PFTs)\par -euvolemic today - cont. lasix at 80mg BID & moustapha 25 daily\par \par *afib-permanent\par *s/p CVA-per MRI study '17-"mild chronic microvascular changes"\par *s/p watchman Apr '19-due to fall risk\par -cont. BB, CaB for rate control.\par -cont. ASA as reccomended post watchman implant\par by EP implanting physician Dr. Clark (see may '19 note in [facility]).\par \par *HTN\par -controlled\par \par *obesity\par -weights unchanged appears uninterested in weight loss.\par \par *VIJAY\par -refuses CPAP\par \par Return 4 months.

## 2022-10-03 ENCOUNTER — RX RENEWAL (OUTPATIENT)
Age: 67
End: 2022-10-03

## 2022-10-27 ENCOUNTER — RX RENEWAL (OUTPATIENT)
Age: 67
End: 2022-10-27

## 2022-11-03 RX ORDER — ALBUTEROL SULFATE 2.5 MG/3ML
(2.5 MG/3ML) SOLUTION RESPIRATORY (INHALATION)
Qty: 1 | Refills: 5 | Status: ACTIVE | COMMUNITY
Start: 2018-04-20 | End: 1900-01-01

## 2022-11-07 ENCOUNTER — APPOINTMENT (OUTPATIENT)
Dept: FAMILY MEDICINE | Facility: CLINIC | Age: 67
End: 2022-11-07

## 2022-11-07 VITALS
DIASTOLIC BLOOD PRESSURE: 70 MMHG | SYSTOLIC BLOOD PRESSURE: 132 MMHG | HEART RATE: 86 BPM | HEIGHT: 72 IN | WEIGHT: 290 LBS | OXYGEN SATURATION: 93 % | BODY MASS INDEX: 39.28 KG/M2 | TEMPERATURE: 98.2 F

## 2022-11-07 DIAGNOSIS — K59.00 CONSTIPATION, UNSPECIFIED: ICD-10-CM

## 2022-11-07 PROCEDURE — 99214 OFFICE O/P EST MOD 30 MIN: CPT

## 2022-11-07 NOTE — PLAN
[FreeTextEntry1] : - START: Docusate Sodium 100 MG Oral Capsule; Take 1 Capsule Twice Daily as Needed\par - START: Polyethylene Glycol 3350 17 GM/SCOOP Oral Powder; Mix 1 Capful (17GM) in 8 Ounces of Water, Juice, or Tea and Drink Daily\par - START: Proctocort 30 MG Rectal Suppository; Insert 1 Suppository Rectally 2 Times Daily \par - Pt to see Colon & rectal surgery if pain/bleeding persists, referral provided\par - Adv to take 1000 MCG Vitamin B12 supplement \par - Vitamin B 12 injection administered today\par - F/u if symptom persists or worsens

## 2022-11-07 NOTE — ASSESSMENT
[FreeTextEntry1] : # Constipation\par - Started on  Docusate Sodium 100 MG\par - Started on Proctocort 30 MG\par - Started on Polyethylene Glycol 3350 17 GM/SCOOP \par - Colon and rectal Surgeyr referral provided\par \par # Vitamin B 12 Deficiency\par - Adv to take OTC Vitamin B12 1000 MCG supplement\par - Injection administered today

## 2022-11-07 NOTE — ADDENDUM
[FreeTextEntry1] : I, Deidre Ponce acting as a scribe for Dr. Augusta Vasquez on November 7, 2022.\par

## 2022-11-07 NOTE — HISTORY OF PRESENT ILLNESS
[FreeTextEntry1] : Follow up A fib./ COPD/ HTN  [de-identified] : Sona is a 67 year male here today for F/u on Afib/COPD/HTN. He c/o constipation x 48 hours. He stated constipation has led to bleeding. He stated he's been experiencing pain for the past 24 hours. He denied having an anal vitale or hemorrhoids in the past. Pt has been taking Senna pills at night though constipation has persisted. Pt attributed his weight loss to healthy eating habits and lack of sugar intake. \par \par Pt confirmed he had received an influenza vaccination.

## 2022-11-07 NOTE — END OF VISIT
[FreeTextEntry3] : Medical record entries made by the scribe today today, were at my direction and personally dictated to them by me, Dr. Augusta Vasquez on November 7, 2022. I have reviewed the chart and agree that the record accurately reflects my personal performance of the history, physical exam, assessment, and plan.\par

## 2022-11-14 NOTE — PATIENT PROFILE ADULT. - HEALTHCARE INFORMATION NEEDED, PROFILE
[address] Patient Status:  Outpatient in a Bed   Age/Gender 63year old female MRN [id number]   [facility] SURGERY Attending Franny Perea MD   [facility] Day # 0 PCP Glenda Zhou MD       Anesthesia Post-op Note    LEFT TOTAL KNEE REPLACEMENT    Procedure Summary     Date: 11/14/22 Room / Location: [facility] MAIN OR 05 / [facility] MAIN OR    Anesthesia Start: 1003 Anesthesia Stop: 3887    Procedure: LEFT TOTAL KNEE REPLACEMENT (Left) Diagnosis: (LEFT KNEE OSTEOARTHRITIS)    Surgeons: Franny Perea MD Anesthesiologist: Mir Owens MD    Anesthesia Type: spinal ASA Status: 3          Anesthesia Type: spinal    Vitals Value Taken Time   /74 11/14/22 1154   Temp 98.9 11/14/22 1154   Pulse 99 11/14/22 1154   Resp 18 11/14/22 1154   SpO2 98 % 11/14/22 1153   Vitals shown include unvalidated device data. Patient Location: PACU    Anesthesia Type: spinal    Airway Patency: patent    Postop Pain Control: adequate    Mental Status: preanesthetic baseline    Nausea/Vomiting: none    Cardiopulmonary/Hydration status: stable euvolemic    Complications: no apparent anesthesia related complications    Postop vital signs: stable    Dental Exam: Unchanged from Preop    Patient to be discharged from PACU when criteria met. none

## 2022-11-27 ENCOUNTER — RX RENEWAL (OUTPATIENT)
Age: 67
End: 2022-11-27

## 2022-12-12 ENCOUNTER — RX RENEWAL (OUTPATIENT)
Age: 67
End: 2022-12-12

## 2022-12-17 NOTE — PROGRESS NOTE ADULT - ATTENDING COMMENTS
Implemented All Universal Safety Interventions:  [place] to call system. Call bell, personal items and telephone within reach. Instruct patient to call for assistance. Room bathroom lighting operational. Non-slip footwear when patient is off stretcher. Physically safe environment: no spills, clutter or unnecessary equipment. Stretcher in lowest position, wheels locked, appropriate side rails in place.
agree w/ above

## 2022-12-20 NOTE — PROVIDER CONTACT NOTE (CRITICAL VALUE NOTIFICATION) - TEST AND RESULT REPORTED:
Intubation    Date/Time: 12/20/2022 7:19 AM  Performed by: Felix Nj DO  Authorized by: Lore Palomino MD     Intubation:     Induction:  Intravenous    Intubated:  Postinduction    Mask Ventilation:  Easy mask    Attempts:  1    Attempted By:  Resident anesthesiologist    Difficult Airway Encountered?: No      Complications:  None    Airway Device:  Supraglottic airway/LMA    Airway Device Size:  3.5    Placement Verified By:  Capnometry    Complicating Factors:  None    Findings Post-Intubation:  BS equal bilateral and atraumatic/condition of teeth unchanged     Vanco trough 39.2

## 2022-12-27 ENCOUNTER — RX RENEWAL (OUTPATIENT)
Age: 67
End: 2022-12-27

## 2023-01-03 ENCOUNTER — NON-APPOINTMENT (OUTPATIENT)
Age: 68
End: 2023-01-03

## 2023-01-03 ENCOUNTER — APPOINTMENT (OUTPATIENT)
Dept: INTERNAL MEDICINE | Facility: CLINIC | Age: 68
End: 2023-01-03
Payer: MEDICARE

## 2023-01-03 VITALS
HEIGHT: 72 IN | DIASTOLIC BLOOD PRESSURE: 62 MMHG | SYSTOLIC BLOOD PRESSURE: 108 MMHG | WEIGHT: 306 LBS | BODY MASS INDEX: 41.45 KG/M2 | OXYGEN SATURATION: 95 % | HEART RATE: 80 BPM | TEMPERATURE: 98 F

## 2023-01-03 DIAGNOSIS — G47.34 IDIOPATHIC SLEEP RELATED NONOBSTRUCTIVE ALVEOLAR HYPOVENTILATION: ICD-10-CM

## 2023-01-03 PROCEDURE — 99214 OFFICE O/P EST MOD 30 MIN: CPT | Mod: 25

## 2023-01-03 PROCEDURE — 94060 EVALUATION OF WHEEZING: CPT

## 2023-01-03 NOTE — REVIEW OF SYSTEMS
[Fatigue] : fatigue [Dyspnea on Exertion] : dyspnea on exertion [Negative] : Heme/Lymph [FreeTextEntry9] : Left below the knee amputation

## 2023-01-03 NOTE — DATA REVIEWED
[FreeTextEntry1] : Spirometry pre and postbronchodilator therapy was performed.  FEV1 is 1.39 L which is 38% predicted.  FVC is 2.17 L which is 44% predicted.  FEV1/FVC ratio is 64%.  FEF 25/75% is 0.79 L/s which is 27% predicted.  PEF is 2.95 L/s which is 32% predicted.  There is no significant bronchodilator response.

## 2023-01-03 NOTE — HISTORY OF PRESENT ILLNESS
[TextBox_4] : Mr. Boone presents for followup evaluation accompanied by his daughter. He remained significantly obese. The patient has severe shortness of breath with minimal exertion. He was recently treated for a pneumonia with doxycycline for 10 days. He continues on nocturnal oxygen.

## 2023-01-03 NOTE — PLAN
[FreeTextEntry1] : Mr. Boone presents for follow-up evaluation accompanied by his daughter.\par \par 1.  Patient will continue on Spiriva and albuterol as needed.  He was also prescribed Symbicort 160/4.5 mcg 2 puffs twice daily but has not been able to get it because of cost.  His daughter will check with the insurance company and see what equivalent metered-dose inhaler is covered.\par \par 2.  Patient will continue on nocturnal oxygen.\par \par 3.  Patient still has shortness of breath with minimal exertion.  This is multifactorial.  Patient has COPD, is morbidly obese with cardiovascular deconditioning and has pulmonary hypertension.\par \par 4.  Follow-up in 6 months.

## 2023-01-03 NOTE — PHYSICAL EXAM
[No Acute Distress] : no acute distress [Well Nourished] : well nourished [Well Developed] : well developed [Well-Appearing] : well-appearing [Normal Sclera/Conjunctiva] : normal sclera/conjunctiva [PERRL] : pupils equal round and reactive to light [EOMI] : extraocular movements intact [Normal Outer Ear/Nose] : the outer ears and nose were normal in appearance [Normal Oropharynx] : the oropharynx was normal [No JVD] : no jugular venous distention [No Lymphadenopathy] : no lymphadenopathy [Supple] : supple [Thyroid Normal, No Nodules] : the thyroid was normal and there were no nodules present [No Respiratory Distress] : no respiratory distress  [No Accessory Muscle Use] : no accessory muscle use [Clear to Auscultation] : lungs were clear to auscultation bilaterally [Normal Rate] : normal rate  [Regular Rhythm] : with a regular rhythm [Normal S1, S2] : normal S1 and S2 [No Murmur] : no murmur heard [No Carotid Bruits] : no carotid bruits [No Abdominal Bruit] : a ~M bruit was not heard ~T in the abdomen [No Varicosities] : no varicosities [Pedal Pulses Present] : the pedal pulses are present [No Edema] : there was no peripheral edema [No Palpable Aorta] : no palpable aorta [No Extremity Clubbing/Cyanosis] : no extremity clubbing/cyanosis [Soft] : abdomen soft [Non Tender] : non-tender [Non-distended] : non-distended [No Masses] : no abdominal mass palpated [No HSM] : no HSM [Normal Bowel Sounds] : normal bowel sounds [Normal Posterior Cervical Nodes] : no posterior cervical lymphadenopathy [Normal Anterior Cervical Nodes] : no anterior cervical lymphadenopathy [No CVA Tenderness] : no CVA  tenderness [No Spinal Tenderness] : no spinal tenderness [No Joint Swelling] : no joint swelling [Grossly Normal Strength/Tone] : grossly normal strength/tone [No Rash] : no rash [Coordination Grossly Intact] : coordination grossly intact [No Focal Deficits] : no focal deficits [Normal Gait] : normal gait [Deep Tendon Reflexes (DTR)] : deep tendon reflexes were 2+ and symmetric [Normal Affect] : the affect was normal [Normal Insight/Judgement] : insight and judgment were intact [de-identified] : Obese [de-identified] : Left below the knee amputation

## 2023-01-03 NOTE — PROCEDURE
[FreeTextEntry1] : spirometry shows significant obstructive lung disease. FEV1 is 1.06 L which is 29% predicted. FVC is 1.84 L which is 37% predicted. FEV1/FVC ratio was 58%.

## 2023-01-03 NOTE — PHYSICAL EXAM
[No Acute Distress] : no acute distress [Normal Oropharynx] : normal oropharynx [IV] : Mallampati Class: IV [Normal Appearance] : normal appearance [No Neck Mass] : no neck mass [Normal Rate/Rhythm] : normal rate/rhythm [Normal S1, S2] : normal s1, s2 [No Resp Distress] : no resp distress [No Murmurs] : no murmurs [No Abnormalities] : no abnormalities [Benign] : benign [No Clubbing] : no clubbing [No Cyanosis] : no cyanosis [No Edema] : no edema [FROM] : FROM [1+ Pitting] : 1+ pitting [Normal Color/ Pigmentation] : normal color/ pigmentation [No Focal Deficits] : no focal deficits [Oriented x3] : oriented x3 [Normal Affect] : normal affect [TextBox_2] : morbidly obese [TextBox_68] : diminished breath sounds at bases [TextBox_99] : has a left leg prosthesis [TextBox_105] : Left below the knee amputation

## 2023-01-03 NOTE — HISTORY OF PRESENT ILLNESS
[FreeTextEntry1] : Follow-up evaluation [de-identified] : Mr. Boone presents for follow-up evaluation accompanied by his daughter.\par He still has shortness of breath with minimal exertion.\par Patient has a history of nocturnal hypoxemia and is wearing oxygen at night.\par He also has a history of obstructive sleep apnea but refuses to wear CPAP mask.\par History of left below the knee amputation secondary to trauma.

## 2023-01-04 ENCOUNTER — RX RENEWAL (OUTPATIENT)
Age: 68
End: 2023-01-04

## 2023-01-05 ENCOUNTER — APPOINTMENT (OUTPATIENT)
Dept: COLORECTAL SURGERY | Facility: CLINIC | Age: 68
End: 2023-01-05
Payer: MEDICARE

## 2023-01-05 ENCOUNTER — NON-APPOINTMENT (OUTPATIENT)
Age: 68
End: 2023-01-05

## 2023-01-05 VITALS
OXYGEN SATURATION: 93 % | SYSTOLIC BLOOD PRESSURE: 122 MMHG | RESPIRATION RATE: 16 BRPM | HEIGHT: 72 IN | TEMPERATURE: 98 F | WEIGHT: 306 LBS | HEART RATE: 79 BPM | DIASTOLIC BLOOD PRESSURE: 66 MMHG | BODY MASS INDEX: 41.45 KG/M2

## 2023-01-05 PROCEDURE — 99203 OFFICE O/P NEW LOW 30 MIN: CPT | Mod: 25

## 2023-01-05 PROCEDURE — 46221 LIGATION OF HEMORRHOID(S): CPT

## 2023-01-05 NOTE — PROCEDURE
[FreeTextEntry1] : Anoscopy\par \par I discussed the reason for the procedure, and the risks and benefits with the patient. Risks including bleeding and discomfort were discussed. The patient understood or discussion and would like to proceed with the procedure.\par \par After completing a digital rectal exam a well lubricated anoscope was gently inserted into the anus. Complete inspection was performed. No tenderness on insertion of the anoscope, and the procedure was tolerated well. No fissures, fistula openings, enlarged hemorrhoids or masses were identified.\par \par Findings: Enlarged internal hemorrhoids. Banding performed LL x 1, RP x 1, RA x 1

## 2023-01-05 NOTE — ASSESSMENT
[FreeTextEntry1] : 67 year old male who presents for evaluation of rectal bleeding during hard bowel movements. Findings on anoscopy: Enlarged internal hemorrhoids. Banding performed LL x 1, RP x 1, RA x 1. Recommended dietary modification to increase fiber intake, along with judicious water intake and daily stool softeners to prevent straining and constipation\par \par Plan of care for Hemorrhoids\par Add daily fiber supplementation to diet, Metamucil, Benefiber, or fiber supplement of preference\par Daily over the counter stool softener (eg.Colace) to prevent straining\par Judicious water intake\par Refrain from straining or lingering (eg. reading) on the toilet.\par Warm sitz bath after bowel movements, no more than 3/day, do not exceed 10 minutes per sitz bath\par Post band ligation instruction given - For worsening pain, fevers, or trouble urinating, patient advised to call office to arrange for urgent re-evaluation.\par Patient will also need GI follow up to plan his next colonoscopy\par Patient will follow up in 4 weeks

## 2023-01-05 NOTE — HISTORY OF PRESENT ILLNESS
[FreeTextEntry1] : 67 year old male with multiple comorbidities who presents for evaluation of rectal bleeding precipitated by hard stools/constipation. Patient reports onset of rectal bleeding a few months ago in November after a hard bowel movement. Per is daughter who was also present she made this appointment after he had rectal bleeding one day that was not caused by any bowel movements. No other symptoms reported. Patient's last colonoscopy was 5/29/2019 while admitted for UGIB. Ulcerated cecal lesion was seen at that time and biopsied with path showing inflamed granulation tissue and ulcer exudate.

## 2023-01-05 NOTE — PHYSICAL EXAM
[Normal rectal exam] : exam was normal [Alert] : alert [Oriented to Person] : oriented to person [Oriented to Place] : oriented to place [Oriented to Time] : oriented to time [Calm] : calm [de-identified] : TRACEY: Nontender, slightly enlarged internal hemorrhoids, no palpable masses [de-identified] : Slightly enlarged internal hemorrhoids [de-identified] : NAD [de-identified] : Nonlabored [de-identified] : Normal rate

## 2023-01-17 ENCOUNTER — RX RENEWAL (OUTPATIENT)
Age: 68
End: 2023-01-17

## 2023-01-22 ENCOUNTER — RX RENEWAL (OUTPATIENT)
Age: 68
End: 2023-01-22

## 2023-01-23 ENCOUNTER — RX RENEWAL (OUTPATIENT)
Age: 68
End: 2023-01-23

## 2023-02-02 ENCOUNTER — APPOINTMENT (OUTPATIENT)
Dept: COLORECTAL SURGERY | Facility: CLINIC | Age: 68
End: 2023-02-02
Payer: MEDICARE

## 2023-02-02 VITALS
TEMPERATURE: 98 F | BODY MASS INDEX: 41.45 KG/M2 | HEIGHT: 72 IN | HEART RATE: 75 BPM | SYSTOLIC BLOOD PRESSURE: 129 MMHG | DIASTOLIC BLOOD PRESSURE: 73 MMHG | RESPIRATION RATE: 16 BRPM | WEIGHT: 306 LBS

## 2023-02-02 PROCEDURE — 99213 OFFICE O/P EST LOW 20 MIN: CPT | Mod: 25

## 2023-02-02 PROCEDURE — 46221 LIGATION OF HEMORRHOID(S): CPT

## 2023-02-02 NOTE — PHYSICAL EXAM
[Normal rectal exam] : exam was normal [Alert] : alert [Oriented to Person] : oriented to person [Oriented to Place] : oriented to place [Oriented to Time] : oriented to time [Calm] : calm [Nonprolapsing] : a nonprolapsing (grade I) [Tender, Swollen] : nontender, non-swollen [Thrombosed] : that was not thrombosed [Normal] : was normal [None] : there was no rectal abscess [de-identified] : TRACEY: Nontender, no gross blood slightly enlarged internal hemorrhoids, no palpable masses [de-identified] : Slightly enlarged internal hemorrhoids [de-identified] : NAD [de-identified] : Nonlabored [de-identified] : Normal rate

## 2023-02-02 NOTE — PROCEDURE
[FreeTextEntry1] : Anoscopy\par \par I discussed the reason for the procedure, and the risks and benefits with the patient. Risks including bleeding and discomfort were discussed. The patient understood or discussion and would like to proceed with the procedure.\par \par After completing a digital rectal exam a well lubricated anoscope was gently inserted into the anus. Complete inspection was performed. No tenderness on insertion of the anoscope, and the procedure was tolerated well. No fissures, fistula openings, enlarged hemorrhoids or masses were identified.\par \par Findings: Enlarged internal hemorrhoids. Banding performed LL x 2, RP x 1, RA x 2

## 2023-02-02 NOTE — ASSESSMENT
[FreeTextEntry1] : 67 year old male who presents for evaluation of rectal bleeding during hard bowel movements. Findings on anoscopy: Enlarged internal hemorrhoids. Banding performed LL x 2, RP x 1, RA x 2. Recommended continuation of dietary modification with supplemental fiber intake, along with judicious water intake and daily stool softeners to prevent straining and constipation\par \par Plan of care for Hemorrhoids\par Continue daily fiber supplementation to diet, Metamucil, Benefiber, or fiber supplement of preference\par Daily over the counter stool softener (eg.Colace) to prevent straining\par Judicious water intake\par Refrain from straining or lingering (eg. reading) on the toilet.\par Warm sitz bath after bowel movements, no more than 3/day, do not exceed 10 minutes per sitz bath\par Post band ligation instruction given - For worsening pain, fevers, or trouble urinating, patient advised to call office to arrange for urgent re-evaluation.\par Patient will also need GI follow up to plan his next colonoscopy\par Patient will follow up in 4 weeks

## 2023-02-02 NOTE — HISTORY OF PRESENT ILLNESS
[FreeTextEntry1] : 2/2/2023 - Patient presents today for follow up evaluation. Patient reports improvement since banding and dietary modification to include supplemental fiber and daily stool softener. Patient denies any further BRBPR. No complaints at this time.\par \par 01/05/2023 - 67 year old male with multiple comorbidities who presents for evaluation of rectal bleeding precipitated by hard stools/constipation. Patient reports onset of rectal bleeding a few months ago in November after a hard bowel movement. Per is daughter who was also present she made this appointment after he had rectal bleeding one day that was not caused by any bowel movements. No other symptoms reported. Patient's last colonoscopy was 5/29/2019 while admitted for UGIB. Ulcerated cecal lesion was seen at that time and biopsied with path showing inflamed granulation tissue and ulcer exudate.

## 2023-02-09 NOTE — PHYSICAL THERAPY INITIAL EVALUATION ADULT - PRECAUTIONS/LIMITATIONS, REHAB EVAL
Chronic Kidney Disease stage IV- Baseline creatinine is 2 5-3  Suspected etiology is due to diabetic nephropathy, hypertensive nephrosclerosis, arteriolar nephrosclerosis, +/- renovascular disease  Creatinine and electrolytes at baseline from October 2022  Kidney Smart: completed  Declines dialysis if needed in the future  Understands the risk of death without dialysis when needed  Comprehensive Kidney Care: will plan to complete today 2/9/23     Chronic Systolic CHF- EF 83% and follows with Dr Bertha Love  He takes torsemide 40mg daily and metolazone as needed  Weight has been in the high 140s  Would recommend taking metolazone if weight >150 lbs  Hypertension- Antihypertensive regimen includes carvedilol, imdur, torsemide  Avoid salt  Stay active  Avoid NSAIDs  Proteinuria- Check UPC ratio  Previously at goal      Anemia- Check prior to next visit  Bone Mineral Disorder- Check studies prior to next visit  Diabetes mellitus type 2- HgbA1c above goal but recently regimen was adjusted and seems to be improving on home sugar monitoring  Follow up with Dr Pk Butts or an AP in 3 months    Please call the office with any questions or concerns
no known precautions/limitations

## 2023-02-10 ENCOUNTER — RX RENEWAL (OUTPATIENT)
Age: 68
End: 2023-02-10

## 2023-02-16 ENCOUNTER — RX RENEWAL (OUTPATIENT)
Age: 68
End: 2023-02-16

## 2023-02-17 NOTE — PROGRESS NOTE ADULT - ATTENDING COMMENTS
Pt remains critically ill on ventilatory support with delirium and agitation.  Daughter, Litzy, updated in detail on patient's current situation, she agrees for trach. Dr. Sahni has been contacted. Pt may go to OR tomorrow. Pt is medically optimized for proposed procedure of tracheostomy at this time. No

## 2023-03-02 ENCOUNTER — APPOINTMENT (OUTPATIENT)
Dept: COLORECTAL SURGERY | Facility: CLINIC | Age: 68
End: 2023-03-02
Payer: MEDICARE

## 2023-03-02 VITALS
SYSTOLIC BLOOD PRESSURE: 119 MMHG | TEMPERATURE: 97 F | WEIGHT: 306 LBS | HEIGHT: 72 IN | BODY MASS INDEX: 41.45 KG/M2 | RESPIRATION RATE: 16 BRPM | DIASTOLIC BLOOD PRESSURE: 66 MMHG | HEART RATE: 86 BPM

## 2023-03-02 PROCEDURE — 46221 LIGATION OF HEMORRHOID(S): CPT

## 2023-03-02 PROCEDURE — 99213 OFFICE O/P EST LOW 20 MIN: CPT | Mod: 25

## 2023-03-02 NOTE — HISTORY OF PRESENT ILLNESS
[FreeTextEntry1] : 3/2/2023 - Patient presents for follow up. He is doing well overall, reports minor episodes of spotting. Patient reports that addition of dietary fiber and stool softener has made a drastic improvement. \par \par 2/2/2023 - Patient presents today for follow up evaluation. Patient reports improvement since banding and dietary modification to include supplemental fiber and daily stool softener. Patient denies any further BRBPR. No complaints at this time.\par \par 01/05/2023 - 67 year old male with multiple comorbidities who presents for evaluation of rectal bleeding precipitated by hard stools/constipation. Patient reports onset of rectal bleeding a few months ago in November after a hard bowel movement. Per is daughter who was also present she made this appointment after he had rectal bleeding one day that was not caused by any bowel movements. No other symptoms reported. Patient's last colonoscopy was 5/29/2019 while admitted for UGIB. Ulcerated cecal lesion was seen at that time and biopsied with path showing inflamed granulation tissue and ulcer exudate.

## 2023-03-02 NOTE — ASSESSMENT
[FreeTextEntry1] : 67 year old male who presents for evaluation of rectal bleeding during hard bowel movements. Findings on anoscopy: Enlarged internal hemorrhoids. Banding performed LL x 1, RP x 1. Recommended continuation of dietary modification with supplemental fiber intake, along with judicious water intake and daily stool softeners to prevent straining and constipation\par \par Plan of care for Hemorrhoids\par Continue daily fiber supplementation to diet, Metamucil, Benefiber, or fiber supplement of preference\par Daily over the counter stool softener (eg.Colace) to prevent straining\par Judicious water intake\par Refrain from straining or lingering (eg. reading) on the toilet.\par Warm sitz bath after bowel movements, no more than 3/day, do not exceed 10 minutes per sitz bath\par Post band ligation instruction given - For worsening pain, fevers, or trouble urinating, patient advised to call office to arrange for urgent re-evaluation.\par Patient will also need GI follow up to plan his next colonoscopy\par Patient will follow up in 4 weeks

## 2023-03-02 NOTE — PROCEDURE
[FreeTextEntry1] : Anoscopy\par \par I discussed the reason for the procedure, and the risks and benefits with the patient. Risks including bleeding and discomfort were discussed. The patient understood or discussion and would like to proceed with the procedure.\par \par After completing a digital rectal exam a well lubricated anoscope was gently inserted into the anus. Complete inspection was performed. No tenderness on insertion of the anoscope, and the procedure was tolerated well. No fissures, fistula openings, enlarged hemorrhoids or masses were identified.\par \par Findings: Enlarged internal hemorrhoids. Banding performed LL x 1, RP x 1

## 2023-03-02 NOTE — PHYSICAL EXAM
[Normal rectal exam] : exam was normal [Nonprolapsing] : a nonprolapsing (grade I) [Normal] : was normal [None] : there was no rectal abscess [Alert] : alert [Oriented to Person] : oriented to person [Oriented to Place] : oriented to place [Oriented to Time] : oriented to time [Calm] : calm [Tender, Swollen] : nontender, non-swollen [Thrombosed] : that was not thrombosed [de-identified] : TRACEY: Nontender, no gross blood slightly enlarged internal hemorrhoids, no palpable masses [de-identified] : Slightly enlarged internal hemorrhoids [de-identified] : NAD [de-identified] : Nonlabored [de-identified] : Normal rate

## 2023-03-12 ENCOUNTER — RX RENEWAL (OUTPATIENT)
Age: 68
End: 2023-03-12

## 2023-03-21 ENCOUNTER — APPOINTMENT (OUTPATIENT)
Dept: FAMILY MEDICINE | Facility: CLINIC | Age: 68
End: 2023-03-21

## 2023-03-22 NOTE — ED PROVIDER NOTE - SEVERE SEPSIS CRITERIA MET YN (MLM)
Sepsis Criteria were met: Cyclophosphamide Counseling:  I discussed with the patient the risks of cyclophosphamide including but not limited to hair loss, hormonal abnormalities, decreased fertility, abdominal pain, diarrhea, nausea and vomiting, bone marrow suppression and infection. The patient understands that monitoring is required while taking this medication.

## 2023-03-26 ENCOUNTER — RX RENEWAL (OUTPATIENT)
Age: 68
End: 2023-03-26

## 2023-03-29 ENCOUNTER — RX RENEWAL (OUTPATIENT)
Age: 68
End: 2023-03-29

## 2023-04-06 ENCOUNTER — APPOINTMENT (OUTPATIENT)
Dept: COLORECTAL SURGERY | Facility: CLINIC | Age: 68
End: 2023-04-06

## 2023-04-22 ENCOUNTER — INPATIENT (INPATIENT)
Facility: HOSPITAL | Age: 68
LOS: 2 days | Discharge: HOME | DRG: 291 | End: 2023-04-25
Attending: HOSPITALIST | Admitting: STUDENT IN AN ORGANIZED HEALTH CARE EDUCATION/TRAINING PROGRAM
Payer: MEDICARE

## 2023-04-22 VITALS
TEMPERATURE: 98 F | RESPIRATION RATE: 18 BRPM | SYSTOLIC BLOOD PRESSURE: 152 MMHG | OXYGEN SATURATION: 99 % | DIASTOLIC BLOOD PRESSURE: 81 MMHG | HEART RATE: 90 BPM | WEIGHT: 294.1 LBS

## 2023-04-22 DIAGNOSIS — I48.20 CHRONIC ATRIAL FIBRILLATION, UNSPECIFIED: ICD-10-CM

## 2023-04-22 DIAGNOSIS — I50.33 ACUTE ON CHRONIC DIASTOLIC (CONGESTIVE) HEART FAILURE: ICD-10-CM

## 2023-04-22 DIAGNOSIS — Z98.890 OTHER SPECIFIED POSTPROCEDURAL STATES: Chronic | ICD-10-CM

## 2023-04-22 DIAGNOSIS — S88.119A COMPLETE TRAUMATIC AMPUTATION AT LEVEL BETWEEN KNEE AND ANKLE, UNSPECIFIED LOWER LEG, INITIAL ENCOUNTER: Chronic | ICD-10-CM

## 2023-04-22 DIAGNOSIS — Z89.512 ACQUIRED ABSENCE OF LEFT LEG BELOW KNEE: Chronic | ICD-10-CM

## 2023-04-22 DIAGNOSIS — Z93.1 GASTROSTOMY STATUS: Chronic | ICD-10-CM

## 2023-04-22 DIAGNOSIS — Z95.818 PRESENCE OF OTHER CARDIAC IMPLANTS AND GRAFTS: Chronic | ICD-10-CM

## 2023-04-22 DIAGNOSIS — R06.09 OTHER FORMS OF DYSPNEA: ICD-10-CM

## 2023-04-22 LAB
ADD ON TEST-SPECIMEN IN LAB: SIGNIFICANT CHANGE UP
ALBUMIN SERPL ELPH-MCNC: 3.2 G/DL — LOW (ref 3.3–5)
ALBUMIN SERPL ELPH-MCNC: 3.4 G/DL — SIGNIFICANT CHANGE UP (ref 3.3–5)
ALP SERPL-CCNC: 107 U/L — SIGNIFICANT CHANGE UP (ref 40–120)
ALP SERPL-CCNC: 117 U/L — SIGNIFICANT CHANGE UP (ref 40–120)
ALT FLD-CCNC: 27 U/L — SIGNIFICANT CHANGE UP (ref 12–78)
ALT FLD-CCNC: 30 U/L — SIGNIFICANT CHANGE UP (ref 12–78)
ANION GAP SERPL CALC-SCNC: 5 MMOL/L — SIGNIFICANT CHANGE UP (ref 5–17)
ANION GAP SERPL CALC-SCNC: 8 MMOL/L — SIGNIFICANT CHANGE UP (ref 5–17)
APTT BLD: 26.4 SEC — LOW (ref 27.5–35.5)
AST SERPL-CCNC: 40 U/L — HIGH (ref 15–37)
AST SERPL-CCNC: 42 U/L — HIGH (ref 15–37)
BASOPHILS # BLD AUTO: 0.04 K/UL — SIGNIFICANT CHANGE UP (ref 0–0.2)
BASOPHILS NFR BLD AUTO: 0.5 % — SIGNIFICANT CHANGE UP (ref 0–2)
BILIRUB DIRECT SERPL-MCNC: 0.3 MG/DL — SIGNIFICANT CHANGE UP (ref 0–0.3)
BILIRUB INDIRECT FLD-MCNC: 0.6 MG/DL — SIGNIFICANT CHANGE UP (ref 0.2–1)
BILIRUB SERPL-MCNC: 0.6 MG/DL — SIGNIFICANT CHANGE UP (ref 0.2–1.2)
BILIRUB SERPL-MCNC: 0.9 MG/DL — SIGNIFICANT CHANGE UP (ref 0.2–1.2)
BLD GP AB SCN SERPL QL: SIGNIFICANT CHANGE UP
BUN SERPL-MCNC: 15 MG/DL — SIGNIFICANT CHANGE UP (ref 7–23)
BUN SERPL-MCNC: 17 MG/DL — SIGNIFICANT CHANGE UP (ref 7–23)
CALCIUM SERPL-MCNC: 8.4 MG/DL — LOW (ref 8.5–10.1)
CALCIUM SERPL-MCNC: 8.8 MG/DL — SIGNIFICANT CHANGE UP (ref 8.5–10.1)
CHLORIDE SERPL-SCNC: 103 MMOL/L — SIGNIFICANT CHANGE UP (ref 96–108)
CHLORIDE SERPL-SCNC: 99 MMOL/L — SIGNIFICANT CHANGE UP (ref 96–108)
CO2 SERPL-SCNC: 30 MMOL/L — SIGNIFICANT CHANGE UP (ref 22–31)
CO2 SERPL-SCNC: 31 MMOL/L — SIGNIFICANT CHANGE UP (ref 22–31)
CREAT SERPL-MCNC: 0.8 MG/DL — SIGNIFICANT CHANGE UP (ref 0.5–1.3)
CREAT SERPL-MCNC: 0.88 MG/DL — SIGNIFICANT CHANGE UP (ref 0.5–1.3)
EGFR: 95 ML/MIN/1.73M2 — SIGNIFICANT CHANGE UP
EGFR: 98 ML/MIN/1.73M2 — SIGNIFICANT CHANGE UP
EOSINOPHIL # BLD AUTO: 0.25 K/UL — SIGNIFICANT CHANGE UP (ref 0–0.5)
EOSINOPHIL NFR BLD AUTO: 3.3 % — SIGNIFICANT CHANGE UP (ref 0–6)
ETHANOL SERPL-MCNC: 83 MG/DL — HIGH (ref 0–10)
GLUCOSE BLDC GLUCOMTR-MCNC: 162 MG/DL — HIGH (ref 70–99)
GLUCOSE BLDC GLUCOMTR-MCNC: 195 MG/DL — HIGH (ref 70–99)
GLUCOSE SERPL-MCNC: 110 MG/DL — HIGH (ref 70–99)
GLUCOSE SERPL-MCNC: 212 MG/DL — HIGH (ref 70–99)
HCT VFR BLD CALC: 37.4 % — LOW (ref 39–50)
HGB BLD-MCNC: 12.5 G/DL — LOW (ref 13–17)
IMM GRANULOCYTES NFR BLD AUTO: 0.5 % — SIGNIFICANT CHANGE UP (ref 0–0.9)
INR BLD: 0.99 RATIO — SIGNIFICANT CHANGE UP (ref 0.88–1.16)
LYMPHOCYTES # BLD AUTO: 1.19 K/UL — SIGNIFICANT CHANGE UP (ref 1–3.3)
LYMPHOCYTES # BLD AUTO: 15.8 % — SIGNIFICANT CHANGE UP (ref 13–44)
MAGNESIUM SERPL-MCNC: 1.8 MG/DL — SIGNIFICANT CHANGE UP (ref 1.6–2.6)
MAGNESIUM SERPL-MCNC: 2.1 MG/DL — SIGNIFICANT CHANGE UP (ref 1.6–2.6)
MCHC RBC-ENTMCNC: 31.3 PG — SIGNIFICANT CHANGE UP (ref 27–34)
MCHC RBC-ENTMCNC: 33.4 GM/DL — SIGNIFICANT CHANGE UP (ref 32–36)
MCV RBC AUTO: 93.7 FL — SIGNIFICANT CHANGE UP (ref 80–100)
MONOCYTES # BLD AUTO: 0.56 K/UL — SIGNIFICANT CHANGE UP (ref 0–0.9)
MONOCYTES NFR BLD AUTO: 7.4 % — SIGNIFICANT CHANGE UP (ref 2–14)
NEUTROPHILS # BLD AUTO: 5.44 K/UL — SIGNIFICANT CHANGE UP (ref 1.8–7.4)
NEUTROPHILS NFR BLD AUTO: 72.5 % — SIGNIFICANT CHANGE UP (ref 43–77)
NT-PROBNP SERPL-SCNC: 1525 PG/ML — HIGH (ref 0–125)
PHOSPHATE SERPL-MCNC: 3.3 MG/DL — SIGNIFICANT CHANGE UP (ref 2.5–4.5)
PLATELET # BLD AUTO: 140 K/UL — LOW (ref 150–400)
POTASSIUM SERPL-MCNC: 3.6 MMOL/L — SIGNIFICANT CHANGE UP (ref 3.5–5.3)
POTASSIUM SERPL-MCNC: 3.6 MMOL/L — SIGNIFICANT CHANGE UP (ref 3.5–5.3)
POTASSIUM SERPL-SCNC: 3.6 MMOL/L — SIGNIFICANT CHANGE UP (ref 3.5–5.3)
POTASSIUM SERPL-SCNC: 3.6 MMOL/L — SIGNIFICANT CHANGE UP (ref 3.5–5.3)
PROT SERPL-MCNC: 7.2 GM/DL — SIGNIFICANT CHANGE UP (ref 6–8.3)
PROT SERPL-MCNC: 8 GM/DL — SIGNIFICANT CHANGE UP (ref 6–8.3)
PROTHROM AB SERPL-ACNC: 11.5 SEC — SIGNIFICANT CHANGE UP (ref 10.5–13.4)
RAPID RVP RESULT: SIGNIFICANT CHANGE UP
RBC # BLD: 3.99 M/UL — LOW (ref 4.2–5.8)
RBC # FLD: 13.8 % — SIGNIFICANT CHANGE UP (ref 10.3–14.5)
SARS-COV-2 RNA SPEC QL NAA+PROBE: SIGNIFICANT CHANGE UP
SODIUM SERPL-SCNC: 138 MMOL/L — SIGNIFICANT CHANGE UP (ref 135–145)
SODIUM SERPL-SCNC: 138 MMOL/L — SIGNIFICANT CHANGE UP (ref 135–145)
TROPONIN I, HIGH SENSITIVITY RESULT: 15.54 NG/L — SIGNIFICANT CHANGE UP
TROPONIN I, HIGH SENSITIVITY RESULT: 15.75 NG/L — SIGNIFICANT CHANGE UP
WBC # BLD: 7.52 K/UL — SIGNIFICANT CHANGE UP (ref 3.8–10.5)
WBC # FLD AUTO: 7.52 K/UL — SIGNIFICANT CHANGE UP (ref 3.8–10.5)

## 2023-04-22 PROCEDURE — 84100 ASSAY OF PHOSPHORUS: CPT

## 2023-04-22 PROCEDURE — 99223 1ST HOSP IP/OBS HIGH 75: CPT

## 2023-04-22 PROCEDURE — 94660 CPAP INITIATION&MGMT: CPT

## 2023-04-22 PROCEDURE — 85027 COMPLETE CBC AUTOMATED: CPT

## 2023-04-22 PROCEDURE — 80048 BASIC METABOLIC PNL TOTAL CA: CPT

## 2023-04-22 PROCEDURE — 80076 HEPATIC FUNCTION PANEL: CPT

## 2023-04-22 PROCEDURE — 83735 ASSAY OF MAGNESIUM: CPT

## 2023-04-22 PROCEDURE — 82962 GLUCOSE BLOOD TEST: CPT

## 2023-04-22 PROCEDURE — 82803 BLOOD GASES ANY COMBINATION: CPT

## 2023-04-22 PROCEDURE — 94640 AIRWAY INHALATION TREATMENT: CPT

## 2023-04-22 PROCEDURE — 93010 ELECTROCARDIOGRAM REPORT: CPT

## 2023-04-22 PROCEDURE — 99222 1ST HOSP IP/OBS MODERATE 55: CPT

## 2023-04-22 PROCEDURE — 83036 HEMOGLOBIN GLYCOSYLATED A1C: CPT

## 2023-04-22 PROCEDURE — 94760 N-INVAS EAR/PLS OXIMETRY 1: CPT

## 2023-04-22 PROCEDURE — 99285 EMERGENCY DEPT VISIT HI MDM: CPT

## 2023-04-22 PROCEDURE — 71045 X-RAY EXAM CHEST 1 VIEW: CPT | Mod: 26

## 2023-04-22 PROCEDURE — 36600 WITHDRAWAL OF ARTERIAL BLOOD: CPT

## 2023-04-22 PROCEDURE — 36415 COLL VENOUS BLD VENIPUNCTURE: CPT

## 2023-04-22 RX ORDER — SACCHAROMYCES BOULARDII 250 MG
1 POWDER IN PACKET (EA) ORAL
Qty: 0 | Refills: 0 | DISCHARGE

## 2023-04-22 RX ORDER — DILTIAZEM HCL 120 MG
20 CAPSULE, EXT RELEASE 24 HR ORAL ONCE
Refills: 0 | Status: COMPLETED | OUTPATIENT
Start: 2023-04-22 | End: 2023-04-22

## 2023-04-22 RX ORDER — ENOXAPARIN SODIUM 100 MG/ML
40 INJECTION SUBCUTANEOUS EVERY 24 HOURS
Refills: 0 | Status: DISCONTINUED | OUTPATIENT
Start: 2023-04-22 | End: 2023-04-25

## 2023-04-22 RX ORDER — BUDESONIDE AND FORMOTEROL FUMARATE DIHYDRATE 160; 4.5 UG/1; UG/1
2 AEROSOL RESPIRATORY (INHALATION)
Refills: 0 | Status: DISCONTINUED | OUTPATIENT
Start: 2023-04-22 | End: 2023-04-25

## 2023-04-22 RX ORDER — SPIRONOLACTONE 25 MG/1
25 TABLET, FILM COATED ORAL DAILY
Refills: 0 | Status: DISCONTINUED | OUTPATIENT
Start: 2023-04-22 | End: 2023-04-25

## 2023-04-22 RX ORDER — TIOTROPIUM BROMIDE 18 UG/1
1 CAPSULE ORAL; RESPIRATORY (INHALATION)
Qty: 0 | Refills: 0 | DISCHARGE

## 2023-04-22 RX ORDER — TIOTROPIUM BROMIDE 18 UG/1
2 CAPSULE ORAL; RESPIRATORY (INHALATION) DAILY
Refills: 0 | Status: DISCONTINUED | OUTPATIENT
Start: 2023-04-22 | End: 2023-04-25

## 2023-04-22 RX ORDER — DEXTROSE 50 % IN WATER 50 %
12.5 SYRINGE (ML) INTRAVENOUS ONCE
Refills: 0 | Status: DISCONTINUED | OUTPATIENT
Start: 2023-04-22 | End: 2023-04-24

## 2023-04-22 RX ORDER — FUROSEMIDE 40 MG
40 TABLET ORAL ONCE
Refills: 0 | Status: COMPLETED | OUTPATIENT
Start: 2023-04-22 | End: 2023-04-22

## 2023-04-22 RX ORDER — GABAPENTIN 400 MG/1
400 CAPSULE ORAL THREE TIMES A DAY
Refills: 0 | Status: DISCONTINUED | OUTPATIENT
Start: 2023-04-22 | End: 2023-04-25

## 2023-04-22 RX ORDER — FUROSEMIDE 40 MG
40 TABLET ORAL
Refills: 0 | Status: DISCONTINUED | OUTPATIENT
Start: 2023-04-22 | End: 2023-04-23

## 2023-04-22 RX ORDER — ALBUTEROL 90 UG/1
3 AEROSOL, METERED ORAL
Qty: 0 | Refills: 0 | DISCHARGE

## 2023-04-22 RX ORDER — FUROSEMIDE 40 MG
40 TABLET ORAL ONCE
Refills: 0 | Status: DISCONTINUED | OUTPATIENT
Start: 2023-04-22 | End: 2023-04-22

## 2023-04-22 RX ORDER — SODIUM CHLORIDE 9 MG/ML
1000 INJECTION, SOLUTION INTRAVENOUS
Refills: 0 | Status: DISCONTINUED | OUTPATIENT
Start: 2023-04-22 | End: 2023-04-24

## 2023-04-22 RX ORDER — PANTOPRAZOLE SODIUM 20 MG/1
40 TABLET, DELAYED RELEASE ORAL
Refills: 0 | Status: DISCONTINUED | OUTPATIENT
Start: 2023-04-22 | End: 2023-04-25

## 2023-04-22 RX ORDER — DOXAZOSIN MESYLATE 4 MG
2 TABLET ORAL AT BEDTIME
Refills: 0 | Status: DISCONTINUED | OUTPATIENT
Start: 2023-04-22 | End: 2023-04-25

## 2023-04-22 RX ORDER — QUETIAPINE FUMARATE 200 MG/1
100 TABLET, FILM COATED ORAL AT BEDTIME
Refills: 0 | Status: DISCONTINUED | OUTPATIENT
Start: 2023-04-22 | End: 2023-04-25

## 2023-04-22 RX ORDER — GLUCAGON INJECTION, SOLUTION 0.5 MG/.1ML
1 INJECTION, SOLUTION SUBCUTANEOUS ONCE
Refills: 0 | Status: DISCONTINUED | OUTPATIENT
Start: 2023-04-22 | End: 2023-04-24

## 2023-04-22 RX ORDER — METOPROLOL TARTRATE 50 MG
5 TABLET ORAL ONCE
Refills: 0 | Status: COMPLETED | OUTPATIENT
Start: 2023-04-22 | End: 2023-04-22

## 2023-04-22 RX ORDER — DEXTROSE 50 % IN WATER 50 %
25 SYRINGE (ML) INTRAVENOUS ONCE
Refills: 0 | Status: DISCONTINUED | OUTPATIENT
Start: 2023-04-22 | End: 2023-04-24

## 2023-04-22 RX ORDER — METOPROLOL TARTRATE 50 MG
25 TABLET ORAL
Refills: 0 | Status: DISCONTINUED | OUTPATIENT
Start: 2023-04-22 | End: 2023-04-25

## 2023-04-22 RX ORDER — DEXTROSE 50 % IN WATER 50 %
15 SYRINGE (ML) INTRAVENOUS ONCE
Refills: 0 | Status: DISCONTINUED | OUTPATIENT
Start: 2023-04-22 | End: 2023-04-24

## 2023-04-22 RX ORDER — LANOLIN ALCOHOL/MO/W.PET/CERES
3 CREAM (GRAM) TOPICAL AT BEDTIME
Refills: 0 | Status: DISCONTINUED | OUTPATIENT
Start: 2023-04-22 | End: 2023-04-25

## 2023-04-22 RX ORDER — ACETAMINOPHEN 500 MG
1000 TABLET ORAL ONCE
Refills: 0 | Status: COMPLETED | OUTPATIENT
Start: 2023-04-22 | End: 2023-04-22

## 2023-04-22 RX ORDER — ONDANSETRON 8 MG/1
4 TABLET, FILM COATED ORAL EVERY 6 HOURS
Refills: 0 | Status: DISCONTINUED | OUTPATIENT
Start: 2023-04-22 | End: 2023-04-25

## 2023-04-22 RX ORDER — ASPIRIN/CALCIUM CARB/MAGNESIUM 324 MG
81 TABLET ORAL DAILY
Refills: 0 | Status: DISCONTINUED | OUTPATIENT
Start: 2023-04-22 | End: 2023-04-25

## 2023-04-22 RX ORDER — ALBUTEROL 90 UG/1
2 AEROSOL, METERED ORAL EVERY 6 HOURS
Refills: 0 | Status: DISCONTINUED | OUTPATIENT
Start: 2023-04-22 | End: 2023-04-25

## 2023-04-22 RX ORDER — ACETAMINOPHEN 500 MG
650 TABLET ORAL EVERY 6 HOURS
Refills: 0 | Status: DISCONTINUED | OUTPATIENT
Start: 2023-04-22 | End: 2023-04-25

## 2023-04-22 RX ORDER — INSULIN LISPRO 100/ML
VIAL (ML) SUBCUTANEOUS
Refills: 0 | Status: DISCONTINUED | OUTPATIENT
Start: 2023-04-22 | End: 2023-04-24

## 2023-04-22 RX ORDER — THIAMINE MONONITRATE (VIT B1) 100 MG
100 TABLET ORAL DAILY
Refills: 0 | Status: DISCONTINUED | OUTPATIENT
Start: 2023-04-22 | End: 2023-04-25

## 2023-04-22 RX ORDER — DILTIAZEM HCL 120 MG
120 CAPSULE, EXT RELEASE 24 HR ORAL DAILY
Refills: 0 | Status: DISCONTINUED | OUTPATIENT
Start: 2023-04-22 | End: 2023-04-25

## 2023-04-22 RX ORDER — POLYETHYLENE GLYCOL 3350 17 G/17G
17 POWDER, FOR SOLUTION ORAL
Qty: 0 | Refills: 0 | DISCHARGE

## 2023-04-22 RX ADMIN — GABAPENTIN 400 MILLIGRAM(S): 400 CAPSULE ORAL at 14:57

## 2023-04-22 RX ADMIN — Medication 1000 MILLIGRAM(S): at 04:24

## 2023-04-22 RX ADMIN — Medication 20 MILLIGRAM(S): at 10:26

## 2023-04-22 RX ADMIN — Medication 25 MILLIGRAM(S): at 22:36

## 2023-04-22 RX ADMIN — ONDANSETRON 4 MILLIGRAM(S): 8 TABLET, FILM COATED ORAL at 19:27

## 2023-04-22 RX ADMIN — Medication 125 MILLIGRAM(S): at 01:41

## 2023-04-22 RX ADMIN — Medication 120 MILLIGRAM(S): at 10:14

## 2023-04-22 RX ADMIN — Medication 5 MILLIGRAM(S): at 01:41

## 2023-04-22 RX ADMIN — Medication 25 MILLIGRAM(S): at 10:14

## 2023-04-22 RX ADMIN — Medication 100 MILLIGRAM(S): at 10:14

## 2023-04-22 RX ADMIN — Medication 10 MILLIGRAM(S): at 17:15

## 2023-04-22 RX ADMIN — ALBUTEROL 2 PUFF(S): 90 AEROSOL, METERED ORAL at 21:04

## 2023-04-22 RX ADMIN — Medication 650 MILLIGRAM(S): at 22:35

## 2023-04-22 RX ADMIN — BUDESONIDE AND FORMOTEROL FUMARATE DIHYDRATE 2 PUFF(S): 160; 4.5 AEROSOL RESPIRATORY (INHALATION) at 11:28

## 2023-04-22 RX ADMIN — Medication 650 MILLIGRAM(S): at 23:30

## 2023-04-22 RX ADMIN — ALBUTEROL 2 PUFF(S): 90 AEROSOL, METERED ORAL at 15:54

## 2023-04-22 RX ADMIN — BUDESONIDE AND FORMOTEROL FUMARATE DIHYDRATE 2 PUFF(S): 160; 4.5 AEROSOL RESPIRATORY (INHALATION) at 21:04

## 2023-04-22 RX ADMIN — Medication 81 MILLIGRAM(S): at 10:14

## 2023-04-22 RX ADMIN — SPIRONOLACTONE 25 MILLIGRAM(S): 25 TABLET, FILM COATED ORAL at 10:14

## 2023-04-22 RX ADMIN — QUETIAPINE FUMARATE 100 MILLIGRAM(S): 200 TABLET, FILM COATED ORAL at 22:58

## 2023-04-22 RX ADMIN — Medication 40 MILLIGRAM(S): at 02:51

## 2023-04-22 RX ADMIN — Medication 40 MILLIGRAM(S): at 14:57

## 2023-04-22 RX ADMIN — Medication 10 MILLIGRAM(S): at 22:37

## 2023-04-22 RX ADMIN — GABAPENTIN 400 MILLIGRAM(S): 400 CAPSULE ORAL at 22:36

## 2023-04-22 RX ADMIN — PANTOPRAZOLE SODIUM 40 MILLIGRAM(S): 20 TABLET, DELAYED RELEASE ORAL at 10:14

## 2023-04-22 RX ADMIN — ENOXAPARIN SODIUM 40 MILLIGRAM(S): 100 INJECTION SUBCUTANEOUS at 14:57

## 2023-04-22 RX ADMIN — Medication 40 MILLIGRAM(S): at 02:37

## 2023-04-22 RX ADMIN — Medication 25 MILLIGRAM(S): at 05:05

## 2023-04-22 RX ADMIN — Medication 2 MILLIGRAM(S): at 22:57

## 2023-04-22 RX ADMIN — Medication 3 MILLIGRAM(S): at 22:36

## 2023-04-22 NOTE — CONSULT NOTE ADULT - SUBJECTIVE AND OBJECTIVE BOX
CHIEF COMPLAINT: Patient is a 68y old  Male who presents with a chief complaint of Near Syncope (22 Apr 2023 08:05)      HPI:  68-year-old male with history of HFpEF, permanent atrial fibrillation, Watchman Implant, mitral regurgitation, CVA / mild chronic microvascular changes," HTN, obesity, VIJAY (noncompliant w/ CPAP), alcoholism, COPD, pulmonary HTN, BKA who presented to the ER with complaint of SOB that was temporally associated with an alcohol binge (1 L vodka) and 2 days without usual pharmacotherapy.  Shortness of breath was associated with palpitations and chest discomfort; symptoms exacerbated by alcohol and missed Rx; no clear alleviating factors.      PAST MEDICAL & SURGICAL HISTORY:  Chronic atrial fibrillation  Alcohol abuse  Poor historian  CHF (congestive heart failure)  Cirrhosis  Emphysema of lung  Alcohol withdrawal  Collapsed lung  Meningitis  Falls  Anemia  Chronic obstructive pulmonary disease (COPD)  GERD (gastroesophageal reflux disease)  Hypertension  Presence of Watchman left atrial appendage closure device  Atrial fibrillation  COPD without exacerbation  Chronic CHF  S/P BKA (below knee amputation) unilateral, left  Presence of Watchman left atrial appendage closure device  Unilateral amputation of lower extremity below knee  H/O tracheostomy now removed  S/P percutaneous endoscopic gastrostomy (PEG) tube placement now removed    SOCIAL HISTORY:   Alcohol: Binge use  Smoking: Nonsmoker    FAMILY HISTORY: N/C to presenting problem and with details not known to patient    HOME MEDICATIONS (cardiac):   · Aspirin EC 81 MG Oral Tablet Delayed Release; TAKE 1 TABLET DAILY   · dilTIAZem HCl ER Coated Beads 120 MG Oral Capsule Extended Release 24 Hour; TAKE 1 CAPSULE BY MOUTH EVERY DAY   · Doxazosin Mesylate 2 MG Oral Tablet; TAKE 1 TABLET BY MOUTH DAILY AS DIRECTED   · Furosemide 80 MG Oral Tablet; TAKE 1 TABLET TWICE DAILY   · Metoprolol Tartrate 25 MG Oral Tablet; TAKE 1 TABLET BY MOUTH TWICE DAILY   · Spironolactone 25 MG Oral Tablet; TAKE 1 TABLET BY MOUTH DAILY    MEDICATIONS  (STANDING):  aspirin enteric coated 81 milliGRAM(s) Oral daily  budesonide 160 MICROgram(s)/formoterol 4.5 MICROgram(s) Inhaler 2 Puff(s) Inhalation two times a day  busPIRone 10 milliGRAM(s) Oral two times a day  diltiazem    milliGRAM(s) Oral daily  doxazosin 2 milliGRAM(s) Oral at bedtime  furosemide   Injectable 40 milliGRAM(s) IV Push two times a day  gabapentin 400 milliGRAM(s) Oral three times a day  metoprolol tartrate 25 milliGRAM(s) Oral two times a day  pantoprazole    Tablet 40 milliGRAM(s) Oral before breakfast  QUEtiapine 100 milliGRAM(s) Oral at bedtime  spironolactone 25 milliGRAM(s) Oral daily  thiamine 100 milliGRAM(s) Oral daily  tiotropium 2.5 MICROgram(s) Inhaler 2 Puff(s) Inhalation daily    MEDICATIONS  (PRN):  acetaminophen     Tablet .. 650 milliGRAM(s) Oral every 6 hours PRN Mild Pain (1 - 3)  albuterol    90 MICROgram(s) HFA Inhaler 2 Puff(s) Inhalation every 6 hours PRN Shortness of Breath and/or Wheezing  aluminum hydroxide/magnesium hydroxide/simethicone Suspension 30 milliLiter(s) Oral every 4 hours PRN Dyspepsia  LORazepam   Injectable 1 milliGRAM(s) IV Push every 2 hours PRN CIWA-Ar score increase by 2 points and a total score of 7 or less  melatonin 3 milliGRAM(s) Oral at bedtime PRN Insomnia  ondansetron Injectable 4 milliGRAM(s) IV Push every 6 hours PRN Nausea and/or Vomiting    Allergies:   Duricef (Rash)  Ceclor (Rash)    REVIEW OF SYSTEMS:  CONSTITUTIONAL: No fevers or chills  Eyes: No visual changes  NECK: No pain or stiffness  RESPIRATORY: + shortness of breath  CARDIOVASCULAR: +chest pain, + palpitations  GASTROINTESTINAL: No vomiting  GENITOURINARY: No dysuria, frequency or hematuria  NEUROLOGICAL: No numbness.  SKIN: No itching or rash  All other review of systems is negative unless indicated above    VITAL SIGNS:   Vital Signs Last 24 Hrs  T(C): 36.8 (22 Apr 2023 08:50), Max: 36.8 (22 Apr 2023 00:27)  T(F): 98.3 (22 Apr 2023 08:50), Max: 98.3 (22 Apr 2023 08:50)  HR: 89 (22 Apr 2023 08:50) (80 - 118)  BP: 144/77 (22 Apr 2023 08:50) (119/84 - 152/81)  BP(mean): 92 (22 Apr 2023 07:23) (92 - 93)  RR: 18 (22 Apr 2023 08:50) (18 - 24)  SpO2: 97% (22 Apr 2023 08:50) (96% - 99%)  Patient On (Oxygen Delivery Method): nasal cannula O2 Flow (L/min): 4    PHYSICAL EXAM:  Constitutional: NAD, awake and alert, obese  HEENT:  EOMI,  Pupils round, No oral cyanosis.  Pulmonary: Non-labored, breath sounds are clear bilaterally  Cardiovascular: Irregular, and tachycardic  Gastrointestinal: Bowel Sounds present, soft, nontender.   Lymph: S/p BKA No cervical lymphadenopathy.  Neurological: Alert, tremor present  Skin: No rashes.  Psych:  Mood & affect appropriate    LABS:                12.5   7.52  )-----------( 140      ( 22 Apr 2023 00:41 )             37.4     138    |  103    |  15     ----------------------------<  110    3.6     |  30     |  0.80     Ca    8.4        22 Apr 2023 00:41  Mg     2.1       22 Apr 2023 00:41    TPro  7.2    /  Alb  3.2    /  TBili  0.6    /  DBili  x      /  AST  40     /  ALT  27     /  AlkPhos  107    22 Apr 2023 00:41    PT/INR - ( 22 Apr 2023 00:41 )   PT: 11.5 sec;   INR: 0.99 ratio    PTT - ( 22 Apr 2023 00:41 )  PTT:26.4 sec    Tele: AF with RVR

## 2023-04-22 NOTE — ED PROVIDER NOTE - DIFFERENTIAL DIAGNOSIS
CHF exacerbation, COPD exacerbation, pneumonia, obstructive sleep apnea, deconditioning, atrial fibrillation, at risk for alcohol withdrawal Differential Diagnosis

## 2023-04-22 NOTE — H&P ADULT - NSHPPHYSICALEXAM_GEN_ALL_CORE
Vital Signs Last 24 Hrs  T(C): 36.7 (22 Apr 2023 07:23), Max: 36.8 (22 Apr 2023 00:27)  T(F): 98 (22 Apr 2023 07:23), Max: 98.2 (22 Apr 2023 00:27)  HR: 106 (22 Apr 2023 07:23) (80 - 118)  BP: 119/84 (22 Apr 2023 07:23) (119/84 - 152/81)  BP(mean): 92 (22 Apr 2023 07:23) (92 - 93)  RR: 18 (22 Apr 2023 07:23) (18 - 24)  SpO2: 96% (22 Apr 2023 07:23) (96% - 99%)    Parameters below as of 22 Apr 2023 07:23  Patient On (Oxygen Delivery Method): nasal cannula  O2 Flow (L/min): 4        PHYSICAL EXAM:  Constitutional: NAD, awake and alert  Neurological: AAO x 3, no focal deficits  HEENT: PERRLA, EOMI, MMM  Neck: Soft and supple, No LAD, No JVD  Respiratory: Breath sounds are clear bilaterally, No wheezing, rales or rhonchi  Cardiovascular: S1 and S2, regular rate and rhythm; no Murmurs, gallops or rubs  Gastrointestinal: Bowel Sounds present, soft, nontender, nondistended, no guarding, no rebound tenderness  Back: No CVA tenderness   Extremities: No peripheral edema; +left bka

## 2023-04-22 NOTE — H&P ADULT - ASSESSMENT
# SOB sec to acute diastolic CHF triggered probably AF RVR due to ETOH  - IV Lasix  - continue all other meds; tele monitor    #Hx of Alcohol Use  -monitor for withdrawal- claims he doesn't drink daily    #COPD - stable  - continue symbicort, spiriva, albuterol

## 2023-04-22 NOTE — CONSULT NOTE ADULT - PROBLEM SELECTOR RECOMMENDATION 9
Uncontrolled ventricular response in the setting of alcohol binge and missed Rx x 2 days; continue diltiazem  and metoprolol; IV diltiazem now

## 2023-04-22 NOTE — ED PROVIDER NOTE - CLINICAL SUMMARY MEDICAL DECISION MAKING FREE TEXT BOX
68-year-old male with multiple comorbidities to include COPD on home oxygen, obstructive sleep apnea, CHF, intermittent alcohol abuse presents with worsening dyspnea on exertion and shortness of breath.  The patient is afebrile.  The patient is in no significant respiratory distress on exam.  The patient shows atrial fibrillation with RVR on the monitor.  Patient had recent heavy alcohol intake with last ingestion approximately 24 hours ago the patient does have some risk for alcohol withdrawal so we will give Librium 25 mg p.o.  Patient given Lopressor 5 mg for rate control.  Patient given Lasix 80 mg IV for diuresis.  On reassessment, the patient does not report significant symptomatic relief.  The patient has multiple comorbidities and  limited capacity to care for himself so will admit to observation on telemetry.

## 2023-04-22 NOTE — H&P ADULT - NSHPLABSRESULTS_GEN_ALL_CORE
12.5   7.52  )-----------( 140      ( 22 Apr 2023 00:41 )             37.4   04-22    138  |  103  |  15  ----------------------------<  110<H>  3.6   |  30  |  0.80    Ca    8.4<L>      22 Apr 2023 00:41  Mg     2.1     04-22    TPro  7.2  /  Alb  3.2<L>  /  TBili  0.6  /  DBili  x   /  AST  40<H>  /  ALT  27  /  AlkPhos  107  04-22    < from: TTE Echo Complete w/o Contrast w/ Doppler (06.30.21 @ 14:49) >     Mild concentric left ventricular hypertrophy is present.   Estimated left ventricular ejection fraction is 60-65 %.   The left atrium is severely dilated.   The right atrium is not well seen.   The right ventricle is not well seen, appears grossly normal.   The aortic valve is trileaflet with thin pliable leaflets.   Fibrocalcific changes noted to the mitral valve leaflets with preserved   leaflet excursion.   Highly eccentric mitral regurgitation is noted.(? severity-study limited))   The tricuspid valve leaflets are thin and pliable; valve motion is normal.   Moderate (2+) tricuspid valve regurgitation is present.   Moderate pulmonary hypertension.   No evidence of pericardial effusion.

## 2023-04-22 NOTE — ED ADULT NURSE NOTE - CHPI ED NUR SYMPTOMS NEG
"CDC Testing and Quarantine Guidelines for patients with exposure to a known-positive COVID-19 person:    A "close exposure" is defined as anyone who has had an exposure (masked or unmasked) to a known COVID -19 positive person within 6 ft for longer than 15 minutes. If your exposure meets this definition you are required by CDC guidelines to quarantine for 14 days from time of exposure. CDC now states that a test can be performed for an asymptomatic patient (someone who does not have any symptoms) after a close exposure, and that a test should be done if you develop symptoms after a close exposure as described above.     If you meet the definition of a close exposure, it will not matter whether you are experiencing symptoms- quarantine for 14 days after close exposure is required by CDC guidelines regardless of test status- a negative test does not shorten the quarantine period.     If your exposure does not meet the above definition, you can return to your normal daily activities to include social distancing, wearing a mask and frequent handwashing.      "
no chest pain

## 2023-04-22 NOTE — ED ADULT NURSE REASSESSMENT NOTE - NS ED NURSE REASSESS COMMENT FT1
report taken from ANTONINO Harris at 7:15 am. pt assessed. axo4 no distress noted. report given to floor.

## 2023-04-22 NOTE — ED ADULT NURSE NOTE - OBJECTIVE STATEMENT
Pt comes to ED via EMS c/o increased SOB. Pt has hx afib, CHF, cirrhosis and COPD. Pt endorses O2 use at home. Pt endorses missing daily medication yesterday d/t alcohol use. Pt has L below the knee amputation from work-related injury. Pt is Aox4 and speaking in full sentences. Pt has visibly labored breathing. Pt denies any CP, N/V/D at this time. Pt PIV placed, EKG done at bedside. MD Luu at bedside. Pt safety maintained.

## 2023-04-22 NOTE — H&P ADULT - HISTORY OF PRESENT ILLNESS
68-year-old male with history of atrial fibrillation, CHF, cirrhosis, COPD on home O2 as needed, alcohol abuse presents for progressively worsening shortness of breath, orthopnea and dyspnea on exertion over the past couple of days.  Patient states that it has been worse over the past 24 hours.  Patient states that he does not drink alcohol every day but admits to drinking about a liter of vodka yesterday.  Patient denies any drug use.  Patient denies any chest pain at this time.  Patient has a history of a left BKA but notices increased right lower extremity edema despite taking furosemide 40 mg 3 times daily.  Patient denies any recent changes in his meds

## 2023-04-22 NOTE — ED ADULT NURSE REASSESSMENT NOTE - NS ED NURSE REASSESS COMMENT FT1
Pt comfort measures taken. Pt given plastic urinals, water and pillows. Pt is TBA for observation with bed pending at this time. Pt safety and comfort maintained.

## 2023-04-22 NOTE — ED PROVIDER NOTE - OBJECTIVE STATEMENT
68-year-old male with history of atrial fibrillation, CHF, cirrhosis, COPD on home O2 as needed, alcohol abuse presents for progressively worsening shortness of breath, orthopnea and dyspnea on exertion over the past couple of days.  Patient states that it has been worse over the past 24 hours.  Patient states that he does not drink alcohol every day but admits to drinking about a liter of vodka yesterday.  Patient denies any drug use.  Patient denies any chest pain at this time.  Patient has a history of a left BKA but notices increased right lower extremity edema despite taking furosemide 40 mg 3 times daily.  Patient denies any recent changes in his medications.

## 2023-04-22 NOTE — ED ADULT TRIAGE NOTE - CHIEF COMPLAINT QUOTE
patient c/o sudden SOB for 2 hours.  patient denies CP.  per EMS patient was 88% RA on arrival.  patient currently 99% on 6L dropped to 93% odd oxygen.

## 2023-04-22 NOTE — PHARMACOTHERAPY INTERVENTION NOTE - COMMENTS
Medication history complete. Medications and allergies reviewed with patient's daughter, Litzy [phone] and confirmed with .

## 2023-04-22 NOTE — PATIENT PROFILE ADULT - FALL HARM RISK - HARM RISK INTERVENTIONS
Assistance with ambulation/Assistance OOB with selected safe patient handling equipment/Communicate Risk of Fall with Harm to all staff/Discuss with provider need for PT consult/Monitor gait and stability/Reinforce activity limits and safety measures with patient and family/Tailored Fall Risk Interventions/Visual Cue: Yellow wristband and red socks/Bed in lowest position, wheels locked, appropriate side rails in place/Call bell, personal items and telephone in reach/Instruct patient to call for assistance before getting out of bed or chair/Non-slip footwear when patient is out of bed/[place] to call system/Physically safe environment - no spills, clutter or unnecessary equipment/Purposeful Proactive Rounding/Room/bathroom lighting operational, light cord in reach

## 2023-04-22 NOTE — ED PROVIDER NOTE - PROGRESS NOTE DETAILS
Patient is not tachypneic and his lungs are clear to auscultation.  Patient states that his symptoms have not significantly improved and he reports severe dyspnea on exertion.  The patient notes that he currently lives alone and over the past few days has been dependent on oxygen at 4 L/min for symptom relief.  Will admit the patient to observation status on telemetry.  Carlos Manuel Rg, DO

## 2023-04-23 DIAGNOSIS — G47.33 OBSTRUCTIVE SLEEP APNEA (ADULT) (PEDIATRIC): ICD-10-CM

## 2023-04-23 DIAGNOSIS — J44.9 CHRONIC OBSTRUCTIVE PULMONARY DISEASE, UNSPECIFIED: ICD-10-CM

## 2023-04-23 DIAGNOSIS — E66.2 MORBID (SEVERE) OBESITY WITH ALVEOLAR HYPOVENTILATION: ICD-10-CM

## 2023-04-23 DIAGNOSIS — I27.20 PULMONARY HYPERTENSION, UNSPECIFIED: ICD-10-CM

## 2023-04-23 DIAGNOSIS — I50.33 ACUTE ON CHRONIC DIASTOLIC (CONGESTIVE) HEART FAILURE: ICD-10-CM

## 2023-04-23 LAB
A1C WITH ESTIMATED AVERAGE GLUCOSE RESULT: 6 % — HIGH (ref 4–5.6)
ANION GAP SERPL CALC-SCNC: 4 MMOL/L — LOW (ref 5–17)
BASE EXCESS BLDA CALC-SCNC: 9.1 MMOL/L — HIGH (ref -2–3)
BUN SERPL-MCNC: 21 MG/DL — SIGNIFICANT CHANGE UP (ref 7–23)
CALCIUM SERPL-MCNC: 8.9 MG/DL — SIGNIFICANT CHANGE UP (ref 8.5–10.1)
CHLORIDE SERPL-SCNC: 98 MMOL/L — SIGNIFICANT CHANGE UP (ref 96–108)
CO2 SERPL-SCNC: 31 MMOL/L — SIGNIFICANT CHANGE UP (ref 22–31)
CREAT SERPL-MCNC: 0.92 MG/DL — SIGNIFICANT CHANGE UP (ref 0.5–1.3)
EGFR: 92 ML/MIN/1.73M2 — SIGNIFICANT CHANGE UP
ESTIMATED AVERAGE GLUCOSE: 126 MG/DL — HIGH (ref 68–114)
GAS PNL BLDA: SIGNIFICANT CHANGE UP
GLUCOSE BLDC GLUCOMTR-MCNC: 124 MG/DL — HIGH (ref 70–99)
GLUCOSE BLDC GLUCOMTR-MCNC: 130 MG/DL — HIGH (ref 70–99)
GLUCOSE BLDC GLUCOMTR-MCNC: 132 MG/DL — HIGH (ref 70–99)
GLUCOSE BLDC GLUCOMTR-MCNC: 135 MG/DL — HIGH (ref 70–99)
GLUCOSE SERPL-MCNC: 131 MG/DL — HIGH (ref 70–99)
HCO3 BLDA-SCNC: 35 MMOL/L — HIGH (ref 21–28)
HCT VFR BLD CALC: 39.3 % — SIGNIFICANT CHANGE UP (ref 39–50)
HGB BLD-MCNC: 12.6 G/DL — LOW (ref 13–17)
MAGNESIUM SERPL-MCNC: 2 MG/DL — SIGNIFICANT CHANGE UP (ref 1.6–2.6)
MCHC RBC-ENTMCNC: 30.6 PG — SIGNIFICANT CHANGE UP (ref 27–34)
MCHC RBC-ENTMCNC: 32.1 GM/DL — SIGNIFICANT CHANGE UP (ref 32–36)
MCV RBC AUTO: 95.4 FL — SIGNIFICANT CHANGE UP (ref 80–100)
PCO2 BLDA: 53 MMHG — HIGH (ref 35–48)
PH BLDA: 7.43 — SIGNIFICANT CHANGE UP (ref 7.35–7.45)
PHOSPHATE SERPL-MCNC: 4 MG/DL — SIGNIFICANT CHANGE UP (ref 2.5–4.5)
PLATELET # BLD AUTO: 162 K/UL — SIGNIFICANT CHANGE UP (ref 150–400)
PO2 BLDA: 92 MMHG — SIGNIFICANT CHANGE UP (ref 83–108)
POTASSIUM SERPL-MCNC: 3.6 MMOL/L — SIGNIFICANT CHANGE UP (ref 3.5–5.3)
POTASSIUM SERPL-SCNC: 3.6 MMOL/L — SIGNIFICANT CHANGE UP (ref 3.5–5.3)
RBC # BLD: 4.12 M/UL — LOW (ref 4.2–5.8)
RBC # FLD: 13.8 % — SIGNIFICANT CHANGE UP (ref 10.3–14.5)
SAO2 % BLDA: 98 % — SIGNIFICANT CHANGE UP (ref 94–98)
SODIUM SERPL-SCNC: 133 MMOL/L — LOW (ref 135–145)
WBC # BLD: 10.21 K/UL — SIGNIFICANT CHANGE UP (ref 3.8–10.5)
WBC # FLD AUTO: 10.21 K/UL — SIGNIFICANT CHANGE UP (ref 3.8–10.5)

## 2023-04-23 PROCEDURE — 99223 1ST HOSP IP/OBS HIGH 75: CPT

## 2023-04-23 PROCEDURE — 99232 SBSQ HOSP IP/OBS MODERATE 35: CPT

## 2023-04-23 PROCEDURE — 99233 SBSQ HOSP IP/OBS HIGH 50: CPT

## 2023-04-23 RX ORDER — FUROSEMIDE 40 MG
40 TABLET ORAL DAILY
Refills: 0 | Status: DISCONTINUED | OUTPATIENT
Start: 2023-04-23 | End: 2023-04-23

## 2023-04-23 RX ORDER — FUROSEMIDE 40 MG
60 TABLET ORAL
Refills: 0 | Status: DISCONTINUED | OUTPATIENT
Start: 2023-04-23 | End: 2023-04-25

## 2023-04-23 RX ADMIN — ENOXAPARIN SODIUM 40 MILLIGRAM(S): 100 INJECTION SUBCUTANEOUS at 14:14

## 2023-04-23 RX ADMIN — Medication 10 MILLIGRAM(S): at 10:23

## 2023-04-23 RX ADMIN — GABAPENTIN 400 MILLIGRAM(S): 400 CAPSULE ORAL at 23:22

## 2023-04-23 RX ADMIN — ALBUTEROL 2 PUFF(S): 90 AEROSOL, METERED ORAL at 19:51

## 2023-04-23 RX ADMIN — GABAPENTIN 400 MILLIGRAM(S): 400 CAPSULE ORAL at 05:30

## 2023-04-23 RX ADMIN — QUETIAPINE FUMARATE 100 MILLIGRAM(S): 200 TABLET, FILM COATED ORAL at 23:22

## 2023-04-23 RX ADMIN — GABAPENTIN 400 MILLIGRAM(S): 400 CAPSULE ORAL at 14:14

## 2023-04-23 RX ADMIN — Medication 25 MILLIGRAM(S): at 23:22

## 2023-04-23 RX ADMIN — Medication 650 MILLIGRAM(S): at 23:23

## 2023-04-23 RX ADMIN — BUDESONIDE AND FORMOTEROL FUMARATE DIHYDRATE 2 PUFF(S): 160; 4.5 AEROSOL RESPIRATORY (INHALATION) at 08:47

## 2023-04-23 RX ADMIN — Medication 60 MILLIGRAM(S): at 17:21

## 2023-04-23 RX ADMIN — Medication 100 MILLIGRAM(S): at 10:23

## 2023-04-23 RX ADMIN — ALBUTEROL 2 PUFF(S): 90 AEROSOL, METERED ORAL at 08:42

## 2023-04-23 RX ADMIN — Medication 40 MILLIGRAM(S): at 05:31

## 2023-04-23 RX ADMIN — Medication 3 MILLIGRAM(S): at 23:22

## 2023-04-23 RX ADMIN — SPIRONOLACTONE 25 MILLIGRAM(S): 25 TABLET, FILM COATED ORAL at 10:23

## 2023-04-23 RX ADMIN — TIOTROPIUM BROMIDE 2 PUFF(S): 18 CAPSULE ORAL; RESPIRATORY (INHALATION) at 08:46

## 2023-04-23 RX ADMIN — Medication 25 MILLIGRAM(S): at 10:23

## 2023-04-23 RX ADMIN — PANTOPRAZOLE SODIUM 40 MILLIGRAM(S): 20 TABLET, DELAYED RELEASE ORAL at 05:33

## 2023-04-23 RX ADMIN — Medication 81 MILLIGRAM(S): at 10:23

## 2023-04-23 RX ADMIN — Medication 10 MILLIGRAM(S): at 23:22

## 2023-04-23 RX ADMIN — Medication 2 MILLIGRAM(S): at 23:23

## 2023-04-23 RX ADMIN — Medication 120 MILLIGRAM(S): at 10:24

## 2023-04-23 NOTE — CONSULT NOTE ADULT - CONSULT REQUESTED DATE/TIME
Problem: Safety  Goal: Free from accidental injury  Calls appropriately. Call light within reach. Hourly rounding practiced.      Problem: Pain  Goal: Alleviation of Pain or a reduction in pain to the patient’s comfort goal  Pain well controlled with Norco 2 tabs.  
22-Apr-2023 09:48
23-Apr-2023 12:51

## 2023-04-23 NOTE — CONSULT NOTE ADULT - SUBJECTIVE AND OBJECTIVE BOX
HPI:  68-year-old male with history of atrial fibrillation, CHF, cirrhosis, COPD on home O2 as needed, alcohol abuse presents for progressively worsening shortness of breath, orthopnea and dyspnea on exertion over the past couple of days.  Patient states that it has been worse over the past 24 hours.  Patient states that he does not drink alcohol every day but admits to drinking about a liter of vodka yesterday.  Patient denies any drug use.  Patient denies any chest pain at this time.  Patient has a history of a left BKA but notices increased right lower extremity edema despite taking furosemide 40 mg 3 times daily.  Patient denies any recent changes in his meds (22 Apr 2023 08:05)    His COPD is severe. Former 40 pack-year smoker-quit age 60. Notes clear sputum over past 24 hours. Denies chest pain. Notes that his dyspnea is much improved since he came in to ED, after diuresis. Of note is that he refuses to wear his CPAP at home.      PAST MEDICAL & SURGICAL HISTORY:  Chronic atrial fibrillation      Alcohol abuse      Poor historian      CHF (congestive heart failure)      Cirrhosis      Emphysema of lung      Alcohol withdrawal      Collapsed lung      Meningitis      Falls      Anemia      Chronic obstructive pulmonary disease (COPD)      GERD (gastroesophageal reflux disease)      Hypertension      Presence of Watchman left atrial appendage closure device      Atrial fibrillation      COPD without exacerbation      Chronic CHF      S/P BKA (below knee amputation) unilateral, left      Presence of Watchman left atrial appendage closure device      Unilateral amputation of lower extremity below knee      H/O tracheostomy  now removed      S/P percutaneous endoscopic gastrostomy (PEG) tube placement  now removed          MEDICATIONS  (STANDING):  aspirin enteric coated 81 milliGRAM(s) Oral daily  budesonide 160 MICROgram(s)/formoterol 4.5 MICROgram(s) Inhaler 2 Puff(s) Inhalation two times a day  busPIRone 10 milliGRAM(s) Oral two times a day  dextrose 5%. 1000 milliLiter(s) (100 mL/Hr) IV Continuous <Continuous>  dextrose 5%. 1000 milliLiter(s) (50 mL/Hr) IV Continuous <Continuous>  dextrose 50% Injectable 25 Gram(s) IV Push once  dextrose 50% Injectable 12.5 Gram(s) IV Push once  dextrose 50% Injectable 25 Gram(s) IV Push once  diltiazem    milliGRAM(s) Oral daily  doxazosin 2 milliGRAM(s) Oral at bedtime  enoxaparin Injectable 40 milliGRAM(s) SubCutaneous every 24 hours  furosemide    Tablet 40 milliGRAM(s) Oral three times a day  gabapentin 400 milliGRAM(s) Oral three times a day  glucagon  Injectable 1 milliGRAM(s) IntraMuscular once  insulin lispro (ADMELOG) corrective regimen sliding scale   SubCutaneous three times a day before meals  metoprolol tartrate 25 milliGRAM(s) Oral two times a day  pantoprazole    Tablet 40 milliGRAM(s) Oral before breakfast  QUEtiapine 100 milliGRAM(s) Oral at bedtime  spironolactone 25 milliGRAM(s) Oral daily  thiamine 100 milliGRAM(s) Oral daily  tiotropium 2.5 MICROgram(s) Inhaler 2 Puff(s) Inhalation daily    MEDICATIONS  (PRN):  acetaminophen     Tablet .. 650 milliGRAM(s) Oral every 6 hours PRN Mild Pain (1 - 3)  albuterol    90 MICROgram(s) HFA Inhaler 2 Puff(s) Inhalation every 6 hours PRN Shortness of Breath and/or Wheezing  aluminum hydroxide/magnesium hydroxide/simethicone Suspension 30 milliLiter(s) Oral every 4 hours PRN Dyspepsia  dextrose Oral Gel 15 Gram(s) Oral once PRN Blood Glucose LESS THAN 70 milliGRAM(s)/deciliter  LORazepam   Injectable 1 milliGRAM(s) IV Push every 2 hours PRN CIWA-Ar score increase by 2 points and a total score of 7 or less  melatonin 3 milliGRAM(s) Oral at bedtime PRN Insomnia  ondansetron Injectable 4 milliGRAM(s) IV Push every 6 hours PRN Nausea and/or Vomiting      Allergies    Duricef (Rash)  Ceclor (Rash)    Intolerances        SOCIAL HISTORY: Former tobacco-40 pack years-quit 8 years ago, + etoh abuse     FAMILY HISTORY:      Vital Signs Last 24 Hrs  T(C): 36.4 (23 Apr 2023 08:42), Max: 36.7 (22 Apr 2023 20:35)  T(F): 97.5 (23 Apr 2023 08:42), Max: 98.1 (22 Apr 2023 20:35)  HR: 81 (23 Apr 2023 08:42) (81 - 100)  BP: 121/49 (23 Apr 2023 08:42) (114/68 - 132/60)  BP(mean): --  RR: 17 (23 Apr 2023 08:42) (16 - 18)  SpO2: 94% (23 Apr 2023 08:42) (94% - 99%)    Parameters below as of 23 Apr 2023 08:42  Patient On (Oxygen Delivery Method): nasal cannula  O2 Flow (L/min): 4      REVIEW OF SYSTEMS:    CONSTITUTIONAL:  As per HPI.  HEENT:  Eyes:  No diplopia or blurred vision. ENT:  No earache, sore throat or runny nose.  CARDIOVASCULAR:  No pressure, squeezing, tightness, heaviness or aching about the chest, neck, axilla or epigastrium.  RESPIRATORY:  See above  GASTROINTESTINAL:  No nausea, vomiting or diarrhea.  GENITOURINARY:  No dysuria, frequency or urgency.  MUSCULOSKELETAL:  As per HPI.  SKIN:  No change in skin, hair or nails.  NEUROLOGIC:  No paresthesias, fasciculations, seizures or weakness.  PSYCHIATRIC:  No disorder of thought or mood.  ENDOCRINE:  No heat or cold intolerance, polyuria or polydipsia.  HEMATOLOGICAL:  No easy bruising or bleedings:  .     PHYSICAL EXAMINATION:    GENERAL APPEARANCE:  Pt. is not currently dyspneic, in no distress. Pt. is alert, oriented, and pleasant.  HEENT:  Pupils are normal and react normally. No icterus. Mucous membranes well colored.  NECK:  Supple. No lymphadenopathy. Jugular venous pressure not elevated.    HEART:   The cardiac impulse has a normal quality. Irregular, irreg. Normal S1 and S2.   CHEST:  Chest with coarse BS bilaterally, bibasilar crackles. Normal respiratory effort.  ABDOMEN:  Soft and nontender.   EXTREMITIES:  There is no cyanosis, clubbing. 2+ edema with chronic venous stasis changes.   SKIN:  No rash or significant lesions are noted.    LABS:                        12.6   10.21 )-----------( 162      ( 23 Apr 2023 06:44 )             39.3     04-23    133<L>  |  98  |  21  ----------------------------<  131<H>  3.6   |  31  |  0.92    Ca    8.9      23 Apr 2023 06:44  Phos  4.0     04-23  Mg     2.0     04-23    TPro  8.0  /  Alb  3.4  /  TBili  0.9  /  DBili  0.3  /  AST  42<H>  /  ALT  30  /  AlkPhos  117  04-22    LIVER FUNCTIONS - ( 22 Apr 2023 11:18 )  Alb: 3.4 g/dL / Pro: 8.0 gm/dL / ALK PHOS: 117 U/L / ALT: 30 U/L / AST: 42 U/L / GGT: x           PT/INR - ( 22 Apr 2023 00:41 )   PT: 11.5 sec;   INR: 0.99 ratio         PTT - ( 22 Apr 2023 00:41 )  PTT:26.4 sec        ABG - ( 23 Apr 2023 11:26 )  pH, Arterial: 7.43  pH, Blood: x     /  pCO2: 53    /  pO2: 92    / HCO3: 35    / Base Excess: 9.1   /  SaO2: 98   on 2 L/M               RADIOLOGY & ADDITIONAL STUDIES:

## 2023-04-23 NOTE — PROGRESS NOTE ADULT - SUBJECTIVE AND OBJECTIVE BOX
CHIEF COMPLAINT/INTERVAL HISTORY:    Patient is a 68y old  Male who presents with a chief complaint of Near Syncope (23 Apr 2023 08:46)      HPI:  68-year-old male with history of atrial fibrillation, CHF, cirrhosis, COPD on home O2 as needed, alcohol abuse presents for progressively worsening shortness of breath, orthopnea and dyspnea on exertion over the past couple of days.  Patient states that it has been worse over the past 24 hours.  Patient states that he does not drink alcohol every day but admits to drinking about a liter of vodka yesterday.  Patient denies any drug use.  Patient denies any chest pain at this time.  Patient has a history of a left BKA but notices increased right lower extremity edema despite taking furosemide 40 mg 3 times daily.  Patient denies any recent changes in his meds (22 Apr 2023 08:05)  More somnolent this am, mild co2 ret. No AF RVR so far      ICU Vital Signs Last 24 Hrs  T(C): 36.4 (23 Apr 2023 08:42), Max: 36.7 (22 Apr 2023 20:35)  T(F): 97.5 (23 Apr 2023 08:42), Max: 98.1 (22 Apr 2023 20:35)  HR: 81 (23 Apr 2023 08:42) (81 - 100)  BP: 121/49 (23 Apr 2023 08:42) (114/68 - 132/60)  BP(mean): --  ABP: --  ABP(mean): --  RR: 17 (23 Apr 2023 08:42) (16 - 18)  SpO2: 97% (23 Apr 2023 08:42) (97% - 99%)    O2 Parameters below as of 23 Apr 2023 08:42  Patient On (Oxygen Delivery Method): nasal cannula  O2 Flow (L/min): 4            MEDICATIONS  (STANDING):  aspirin enteric coated 81 milliGRAM(s) Oral daily  budesonide 160 MICROgram(s)/formoterol 4.5 MICROgram(s) Inhaler 2 Puff(s) Inhalation two times a day  busPIRone 10 milliGRAM(s) Oral two times a day  diltiazem    milliGRAM(s) Oral daily  doxazosin 2 milliGRAM(s) Oral at bedtime  enoxaparin Injectable 40 milliGRAM(s) SubCutaneous every 24 hours  furosemide   Injectable 40 milliGRAM(s) IV Push two times a day  gabapentin 400 milliGRAM(s) Oral three times a day  glucagon  Injectable 1 milliGRAM(s) IntraMuscular once  insulin lispro (ADMELOG) corrective regimen sliding scale   SubCutaneous three times a day before meals  metoprolol tartrate 25 milliGRAM(s) Oral two times a day  pantoprazole    Tablet 40 milliGRAM(s) Oral before breakfast  QUEtiapine 100 milliGRAM(s) Oral at bedtime  spironolactone 25 milliGRAM(s) Oral daily  thiamine 100 milliGRAM(s) Oral daily  tiotropium 2.5 MICROgram(s) Inhaler 2 Puff(s) Inhalation daily    MEDICATIONS  (PRN):  acetaminophen     Tablet .. 650 milliGRAM(s) Oral every 6 hours PRN Mild Pain (1 - 3)  albuterol    90 MICROgram(s) HFA Inhaler 2 Puff(s) Inhalation every 6 hours PRN Shortness of Breath and/or Wheezing  aluminum hydroxide/magnesium hydroxide/simethicone Suspension 30 milliLiter(s) Oral every 4 hours PRN Dyspepsia  dextrose Oral Gel 15 Gram(s) Oral once PRN Blood Glucose LESS THAN 70 milliGRAM(s)/deciliter  LORazepam   Injectable 1 milliGRAM(s) IV Push every 2 hours PRN CIWA-Ar score increase by 2 points and a total score of 7 or less  melatonin 3 milliGRAM(s) Oral at bedtime PRN Insomnia  ondansetron Injectable 4 milliGRAM(s) IV Push every 6 hours PRN Nausea and/or Vomiting        PHYSICAL EXAM:    GENERAL: NAD, well-groomed, well-developed  NERVOUS SYSTEM:  Alert & Oriented X3, Motor Strength 5/5 B/L upper and lower extremities; DTRs 2+ intact and symmetric  CHEST/LUNG: Clear to auscultation bilaterally; No rales, rhonchi, wheezing, or rubs  HEART: Regular rate and rhythm; No murmurs, rubs, or gallops  ABDOMEN: Soft, Nontender, Nondistended; Bowel sounds present  EXTREMITIES:  left bka    LABS:                        12.6   10.21 )-----------( 162      ( 23 Apr 2023 06:44 )             39.3     04-23    133<L>  |  98  |  21  ----------------------------<  131<H>  3.6   |  31  |  0.92    Ca    8.9      23 Apr 2023 06:44  Phos  4.0     04-23  Mg     2.0     04-23    TPro  8.0  /  Alb  3.4  /  TBili  0.9  /  DBili  0.3  /  AST  42<H>  /  ALT  30  /  AlkPhos  117  04-22    PT/INR - ( 22 Apr 2023 00:41 )   PT: 11.5 sec;   INR: 0.99 ratio         PTT - ( 22 Apr 2023 00:41 )  PTT:26.4 sec      CAPILLARY BLOOD GLUCOSE      POCT Blood Glucose.: 135 mg/dL (23 Apr 2023 11:49)  POCT Blood Glucose.: 130 mg/dL (23 Apr 2023 07:44)  POCT Blood Glucose.: 162 mg/dL (22 Apr 2023 22:32)  POCT Blood Glucose.: 195 mg/dL (22 Apr 2023 17:07)        < from: TTE Echo Complete w/o Contrast w/ Doppler (06.30.21 @ 14:49) >   Mild concentric left ventricular hypertrophy is present.   Estimated left ventricular ejection fraction is 60-65 %.   The left atrium is severely dilated.   The right atrium is not well seen.   The right ventricle is not well seen, appears grossly normal.   The aortic valve is trileafletwith thin pliable leaflets.   Fibrocalcific changes noted to the mitral valve leaflets with preserved   leaflet excursion.   Highly eccentric mitral regurgitation is noted.(? severity-study limited))   The tricuspid valve leaflets are thin and pliable; valve motion is normal.   Moderate (2+) tricuspid valve regurgitation is present.   Moderate pulmonary hypertension.   No evidence of pericardial effusion.        #SOB sec to acute diastolic CHF triggered probably AF RVR due to ETOH  - IV Lasix- change to PO  - continue all other meds; tele monitor- dc    #Hx of Alcohol Use  -monitor for withdrawal- claims he doesn't drink daily    #COPD - stable  - continue symbicort, spiriva, albuterol    # VIJAY Pulm consult re: CPAP; I did not start ciwa afraid of lethagry he is not wd so far    If he wakes up he can be dc; has O2 at home at night, I ordered O 2 eval anyway in case that is not true and he needs O2     CHIEF COMPLAINT/INTERVAL HISTORY:    Patient is a 68y old  Male who presents with a chief complaint of Near Syncope (23 Apr 2023 08:46)      HPI:  68-year-old male with history of atrial fibrillation, CHF, cirrhosis, COPD on home O2 as needed, alcohol abuse presents for progressively worsening shortness of breath, orthopnea and dyspnea on exertion over the past couple of days.  Patient states that it has been worse over the past 24 hours.  Patient states that he does not drink alcohol every day but admits to drinking about a liter of vodka yesterday.  Patient denies any drug use.  Patient denies any chest pain at this time.  Patient has a history of a left BKA but notices increased right lower extremity edema despite taking furosemide 40 mg 3 times daily.  Patient denies any recent changes in his meds (22 Apr 2023 08:05)  More somnolent this am, mild co2 ret. No AF RVR so far      ICU Vital Signs Last 24 Hrs  T(C): 36.4 (23 Apr 2023 08:42), Max: 36.7 (22 Apr 2023 20:35)  T(F): 97.5 (23 Apr 2023 08:42), Max: 98.1 (22 Apr 2023 20:35)  HR: 81 (23 Apr 2023 08:42) (81 - 100)  BP: 121/49 (23 Apr 2023 08:42) (114/68 - 132/60)  BP(mean): --  ABP: --  ABP(mean): --  RR: 17 (23 Apr 2023 08:42) (16 - 18)  SpO2: 97% (23 Apr 2023 08:42) (97% - 99%)    O2 Parameters below as of 23 Apr 2023 08:42  Patient On (Oxygen Delivery Method): nasal cannula  O2 Flow (L/min): 4            MEDICATIONS  (STANDING):  aspirin enteric coated 81 milliGRAM(s) Oral daily  budesonide 160 MICROgram(s)/formoterol 4.5 MICROgram(s) Inhaler 2 Puff(s) Inhalation two times a day  busPIRone 10 milliGRAM(s) Oral two times a day  diltiazem    milliGRAM(s) Oral daily  doxazosin 2 milliGRAM(s) Oral at bedtime  enoxaparin Injectable 40 milliGRAM(s) SubCutaneous every 24 hours  furosemide   Injectable 40 milliGRAM(s) IV Push two times a day  gabapentin 400 milliGRAM(s) Oral three times a day  glucagon  Injectable 1 milliGRAM(s) IntraMuscular once  insulin lispro (ADMELOG) corrective regimen sliding scale   SubCutaneous three times a day before meals  metoprolol tartrate 25 milliGRAM(s) Oral two times a day  pantoprazole    Tablet 40 milliGRAM(s) Oral before breakfast  QUEtiapine 100 milliGRAM(s) Oral at bedtime  spironolactone 25 milliGRAM(s) Oral daily  thiamine 100 milliGRAM(s) Oral daily  tiotropium 2.5 MICROgram(s) Inhaler 2 Puff(s) Inhalation daily    MEDICATIONS  (PRN):  acetaminophen     Tablet .. 650 milliGRAM(s) Oral every 6 hours PRN Mild Pain (1 - 3)  albuterol    90 MICROgram(s) HFA Inhaler 2 Puff(s) Inhalation every 6 hours PRN Shortness of Breath and/or Wheezing  aluminum hydroxide/magnesium hydroxide/simethicone Suspension 30 milliLiter(s) Oral every 4 hours PRN Dyspepsia  dextrose Oral Gel 15 Gram(s) Oral once PRN Blood Glucose LESS THAN 70 milliGRAM(s)/deciliter  LORazepam   Injectable 1 milliGRAM(s) IV Push every 2 hours PRN CIWA-Ar score increase by 2 points and a total score of 7 or less  melatonin 3 milliGRAM(s) Oral at bedtime PRN Insomnia  ondansetron Injectable 4 milliGRAM(s) IV Push every 6 hours PRN Nausea and/or Vomiting        PHYSICAL EXAM:    GENERAL: NAD, well-groomed, well-developed  NERVOUS SYSTEM:  Alert & Oriented X3, Motor Strength 5/5 B/L upper and lower extremities; DTRs 2+ intact and symmetric  CHEST/LUNG: Clear to auscultation bilaterally; No rales, rhonchi, wheezing, or rubs  HEART: Regular rate and rhythm; No murmurs, rubs, or gallops  ABDOMEN: Soft, Nontender, Nondistended; Bowel sounds present  EXTREMITIES:  left bka    LABS:                        12.6   10.21 )-----------( 162      ( 23 Apr 2023 06:44 )             39.3     04-23    133<L>  |  98  |  21  ----------------------------<  131<H>  3.6   |  31  |  0.92    Ca    8.9      23 Apr 2023 06:44  Phos  4.0     04-23  Mg     2.0     04-23    TPro  8.0  /  Alb  3.4  /  TBili  0.9  /  DBili  0.3  /  AST  42<H>  /  ALT  30  /  AlkPhos  117  04-22    PT/INR - ( 22 Apr 2023 00:41 )   PT: 11.5 sec;   INR: 0.99 ratio         PTT - ( 22 Apr 2023 00:41 )  PTT:26.4 sec      CAPILLARY BLOOD GLUCOSE      POCT Blood Glucose.: 135 mg/dL (23 Apr 2023 11:49)  POCT Blood Glucose.: 130 mg/dL (23 Apr 2023 07:44)  POCT Blood Glucose.: 162 mg/dL (22 Apr 2023 22:32)  POCT Blood Glucose.: 195 mg/dL (22 Apr 2023 17:07)        < from: TTE Echo Complete w/o Contrast w/ Doppler (06.30.21 @ 14:49) >   Mild concentric left ventricular hypertrophy is present.   Estimated left ventricular ejection fraction is 60-65 %.   The left atrium is severely dilated.   The right atrium is not well seen.   The right ventricle is not well seen, appears grossly normal.   The aortic valve is trileafletwith thin pliable leaflets.   Fibrocalcific changes noted to the mitral valve leaflets with preserved   leaflet excursion.   Highly eccentric mitral regurgitation is noted.(? severity-study limited))   The tricuspid valve leaflets are thin and pliable; valve motion is normal.   Moderate (2+) tricuspid valve regurgitation is present.   Moderate pulmonary hypertension.   No evidence of pericardial effusion.        #SOB sec to acute diastolic CHF triggered probably AF RVR due to ETOH  - IV Lasix- change to PO he was taking TID; I would add another diuretic maybe we car try Acetazolamide since  recent studies claim good response  - continue all other meds; tele monitor- dc    #Hx of Alcohol Use  -monitor for withdrawal- claims he doesn't drink daily    #COPD - stable  - continue symbicort, spiriva, albuterol    # VIJAY Pulm consult re: CPAP; I did not start ciwa afraid of lethagry he is not wd so far    If he wakes up he can be dc; has O2 at home at night, I ordered O 2 eval anyway in case that is not true and he needs O2

## 2023-04-23 NOTE — PROGRESS NOTE ADULT - PROBLEM SELECTOR PLAN 1
Improved rate control with re-initiation of Rx missed prior to hospital admission; continue diltiazem and metoprolol; s/p Watchman

## 2023-04-23 NOTE — CONSULT NOTE ADULT - ASSESSMENT
Assessment/Plan    - Maintain Aerosols with Symbicort and Spiriva  - Monitor O2 Sats on nasal canula  - Will attempt trial of BiPAP nocturnally. Patient has refused in past  - Diurese and treat heart failure as per hospitalist team and cardiology

## 2023-04-23 NOTE — PROGRESS NOTE ADULT - SUBJECTIVE AND OBJECTIVE BOX
REASON FOR VISIT:  AF    HPI:  68-year-old male with history of HFpEF, permanent atrial fibrillation, Watchman Implant, mitral regurgitation, CVA / mild chronic microvascular changes," HTN, obesity, VIJAY (noncompliant w/ CPAP), alcoholism, COPD, pulmonary HTN, BKA who presented to the ER with complaint of SOB that was temporally associated with an alcohol binge (1 L vodka) and 2 days without usual pharmacotherapy.      4/23/23:  Feels much better -- no new cardiac complaints.    MEDICATIONS  (STANDING):  aspirin enteric coated 81 milliGRAM(s) Oral daily  budesonide 160 MICROgram(s)/formoterol 4.5 MICROgram(s) Inhaler 2 Puff(s) Inhalation two times a day  busPIRone 10 milliGRAM(s) Oral two times a day  diltiazem    milliGRAM(s) Oral daily  doxazosin 2 milliGRAM(s) Oral at bedtime  enoxaparin Injectable 40 milliGRAM(s) SubCutaneous every 24 hours  furosemide   Injectable 40 milliGRAM(s) IV Push two times a day  gabapentin 400 milliGRAM(s) Oral three times a day  glucagon  Injectable 1 milliGRAM(s) IntraMuscular once  insulin lispro (ADMELOG) corrective regimen sliding scale   SubCutaneous three times a day before meals  metoprolol tartrate 25 milliGRAM(s) Oral two times a day  pantoprazole    Tablet 40 milliGRAM(s) Oral before breakfast  QUEtiapine 100 milliGRAM(s) Oral at bedtime  spironolactone 25 milliGRAM(s) Oral daily  thiamine 100 milliGRAM(s) Oral daily  tiotropium 2.5 MICROgram(s) Inhaler 2 Puff(s) Inhalation daily    MEDICATIONS  (PRN):  acetaminophen     Tablet .. 650 milliGRAM(s) Oral every 6 hours PRN Mild Pain (1 - 3)  albuterol    90 MICROgram(s) HFA Inhaler 2 Puff(s) Inhalation every 6 hours PRN Shortness of Breath and/or Wheezing  aluminum hydroxide/magnesium hydroxide/simethicone Suspension 30 milliLiter(s) Oral every 4 hours PRN Dyspepsia  dextrose Oral Gel 15 Gram(s) Oral once PRN Blood Glucose LESS THAN 70 milliGRAM(s)/deciliter  LORazepam   Injectable 1 milliGRAM(s) IV Push every 2 hours PRN CIWA-Ar score increase by 2 points and a total score of 7 or less  melatonin 3 milliGRAM(s) Oral at bedtime PRN Insomnia  ondansetron Injectable 4 milliGRAM(s) IV Push every 6 hours PRN Nausea and/or Vomiting    Vital Signs Last 24 Hrs  T(C): 36.4 (23 Apr 2023 08:42), Max: 36.8 (22 Apr 2023 08:50)  T(F): 97.5 (23 Apr 2023 08:42), Max: 98.3 (22 Apr 2023 08:50)  HR: 81 (23 Apr 2023 08:42) (81 - 100)  BP: 121/49 (23 Apr 2023 08:42) (114/68 - 150/95)  RR: 17 (23 Apr 2023 08:42) (16 - 18)  SpO2: 97% (23 Apr 2023 08:42) (97% - 99%)  Parameters below as of 23 Apr 2023 08:42  Patient On (Oxygen Delivery Method): nasal cannula O2 Flow (L/min): 4    PHYSICAL EXAM:  Constitutional: NAD, awake and alert, obese  HEENT:  EOMI,  Pupils round, No oral cyanosis.  Pulmonary: Non-labored, breath sounds are clear bilaterally  Cardiovascular: Irregular, normal rate  Gastrointestinal: Bowel Sounds present, soft, nontender.   Lymph: S/p BKA     LABS:               12.5   7.52  )-----------( 140      ( 22 Apr 2023 00:41 )             37.4     133<L>  |  98  |  21  ----------------------------<  131<H>  3.6   |  31  |  0.92    Ca    8.9      23 Apr 2023 06:44  Phos  4.0     04-23  Mg     2.0     04-23    TPro  8.0  /  Alb  3.4  /  TBili  0.9  /  DBili  0.3  /  AST  42<H>  /  ALT  30  /  AlkPhos  117  04-22    TroponinI hsT: <-15.54, <-15.75    Tele: AF

## 2023-04-24 ENCOUNTER — RX RENEWAL (OUTPATIENT)
Age: 68
End: 2023-04-24

## 2023-04-24 LAB
ANION GAP SERPL CALC-SCNC: 2 MMOL/L — LOW (ref 5–17)
BUN SERPL-MCNC: 22 MG/DL — SIGNIFICANT CHANGE UP (ref 7–23)
CALCIUM SERPL-MCNC: 8.4 MG/DL — LOW (ref 8.5–10.1)
CHLORIDE SERPL-SCNC: 97 MMOL/L — SIGNIFICANT CHANGE UP (ref 96–108)
CO2 SERPL-SCNC: 35 MMOL/L — HIGH (ref 22–31)
CREAT SERPL-MCNC: 0.88 MG/DL — SIGNIFICANT CHANGE UP (ref 0.5–1.3)
EGFR: 95 ML/MIN/1.73M2 — SIGNIFICANT CHANGE UP
GLUCOSE BLDC GLUCOMTR-MCNC: 115 MG/DL — HIGH (ref 70–99)
GLUCOSE SERPL-MCNC: 120 MG/DL — HIGH (ref 70–99)
POTASSIUM SERPL-MCNC: 3.2 MMOL/L — LOW (ref 3.5–5.3)
POTASSIUM SERPL-SCNC: 3.2 MMOL/L — LOW (ref 3.5–5.3)
SODIUM SERPL-SCNC: 134 MMOL/L — LOW (ref 135–145)

## 2023-04-24 PROCEDURE — 99233 SBSQ HOSP IP/OBS HIGH 50: CPT

## 2023-04-24 RX ORDER — POTASSIUM CHLORIDE 20 MEQ
40 PACKET (EA) ORAL EVERY 4 HOURS
Refills: 0 | Status: COMPLETED | OUTPATIENT
Start: 2023-04-24 | End: 2023-04-24

## 2023-04-24 RX ADMIN — GABAPENTIN 400 MILLIGRAM(S): 400 CAPSULE ORAL at 14:17

## 2023-04-24 RX ADMIN — Medication 10 MILLIGRAM(S): at 09:27

## 2023-04-24 RX ADMIN — Medication 40 MILLIEQUIVALENT(S): at 11:18

## 2023-04-24 RX ADMIN — PANTOPRAZOLE SODIUM 40 MILLIGRAM(S): 20 TABLET, DELAYED RELEASE ORAL at 06:09

## 2023-04-24 RX ADMIN — BUDESONIDE AND FORMOTEROL FUMARATE DIHYDRATE 2 PUFF(S): 160; 4.5 AEROSOL RESPIRATORY (INHALATION) at 09:37

## 2023-04-24 RX ADMIN — Medication 650 MILLIGRAM(S): at 06:00

## 2023-04-24 RX ADMIN — Medication 650 MILLIGRAM(S): at 05:25

## 2023-04-24 RX ADMIN — Medication 120 MILLIGRAM(S): at 09:27

## 2023-04-24 RX ADMIN — TIOTROPIUM BROMIDE 2 PUFF(S): 18 CAPSULE ORAL; RESPIRATORY (INHALATION) at 09:38

## 2023-04-24 RX ADMIN — Medication 650 MILLIGRAM(S): at 15:08

## 2023-04-24 RX ADMIN — Medication 650 MILLIGRAM(S): at 14:17

## 2023-04-24 RX ADMIN — Medication 650 MILLIGRAM(S): at 00:45

## 2023-04-24 RX ADMIN — GABAPENTIN 400 MILLIGRAM(S): 400 CAPSULE ORAL at 05:25

## 2023-04-24 RX ADMIN — Medication 81 MILLIGRAM(S): at 09:27

## 2023-04-24 RX ADMIN — ENOXAPARIN SODIUM 40 MILLIGRAM(S): 100 INJECTION SUBCUTANEOUS at 14:18

## 2023-04-24 RX ADMIN — GABAPENTIN 400 MILLIGRAM(S): 400 CAPSULE ORAL at 21:08

## 2023-04-24 RX ADMIN — Medication 40 MILLIEQUIVALENT(S): at 14:18

## 2023-04-24 RX ADMIN — BUDESONIDE AND FORMOTEROL FUMARATE DIHYDRATE 2 PUFF(S): 160; 4.5 AEROSOL RESPIRATORY (INHALATION) at 19:51

## 2023-04-24 RX ADMIN — SPIRONOLACTONE 25 MILLIGRAM(S): 25 TABLET, FILM COATED ORAL at 09:28

## 2023-04-24 RX ADMIN — Medication 60 MILLIGRAM(S): at 14:18

## 2023-04-24 RX ADMIN — ALBUTEROL 2 PUFF(S): 90 AEROSOL, METERED ORAL at 19:52

## 2023-04-24 RX ADMIN — Medication 100 MILLIGRAM(S): at 09:28

## 2023-04-24 RX ADMIN — QUETIAPINE FUMARATE 100 MILLIGRAM(S): 200 TABLET, FILM COATED ORAL at 21:08

## 2023-04-24 RX ADMIN — Medication 25 MILLIGRAM(S): at 21:09

## 2023-04-24 RX ADMIN — Medication 10 MILLIGRAM(S): at 21:09

## 2023-04-24 RX ADMIN — Medication 60 MILLIGRAM(S): at 05:28

## 2023-04-24 RX ADMIN — Medication 2 MILLIGRAM(S): at 21:10

## 2023-04-24 RX ADMIN — Medication 25 MILLIGRAM(S): at 09:27

## 2023-04-24 NOTE — PROGRESS NOTE ADULT - SUBJECTIVE AND OBJECTIVE BOX
REASON FOR VISIT:  AF    HPI:  68-year-old male with history of HFpEF, permanent atrial fibrillation, Watchman Implant, mitral regurgitation, CVA / mild chronic microvascular changes," HTN, obesity, VIJAY (noncompliant w/ CPAP), alcoholism, COPD, pulmonary HTN, BKA who presented to the ER with complaint of SOB that was temporally associated with an alcohol binge (1 L vodka) and 2 days without usual pharmacotherapy.      4/23/23:  Feels much better -- no new cardiac complaints.  4/24/23: no complaints, off clara     MEDICATIONS  (STANDING):  aspirin enteric coated 81 milliGRAM(s) Oral daily  budesonide 160 MICROgram(s)/formoterol 4.5 MICROgram(s) Inhaler 2 Puff(s) Inhalation two times a day  busPIRone 10 milliGRAM(s) Oral two times a day  diltiazem    milliGRAM(s) Oral daily  doxazosin 2 milliGRAM(s) Oral at bedtime  enoxaparin Injectable 40 milliGRAM(s) SubCutaneous every 24 hours  furosemide    Tablet 60 milliGRAM(s) Oral two times a day  gabapentin 400 milliGRAM(s) Oral three times a day  metoprolol tartrate 25 milliGRAM(s) Oral two times a day  pantoprazole    Tablet 40 milliGRAM(s) Oral before breakfast  potassium chloride    Tablet ER 40 milliEquivalent(s) Oral every 4 hours  QUEtiapine 100 milliGRAM(s) Oral at bedtime  spironolactone 25 milliGRAM(s) Oral daily  thiamine 100 milliGRAM(s) Oral daily  tiotropium 2.5 MICROgram(s) Inhaler 2 Puff(s) Inhalation daily    MEDICATIONS  (PRN):  acetaminophen     Tablet .. 650 milliGRAM(s) Oral every 6 hours PRN Mild Pain (1 - 3)  albuterol    90 MICROgram(s) HFA Inhaler 2 Puff(s) Inhalation every 6 hours PRN Shortness of Breath and/or Wheezing  aluminum hydroxide/magnesium hydroxide/simethicone Suspension 30 milliLiter(s) Oral every 4 hours PRN Dyspepsia  dextrose Oral Gel 15 Gram(s) Oral once PRN Blood Glucose LESS THAN 70 milliGRAM(s)/deciliter  LORazepam   Injectable 1 milliGRAM(s) IV Push every 2 hours PRN CIWA-Ar score increase by 2 points and a total score of 7 or less  melatonin 3 milliGRAM(s) Oral at bedtime PRN Insomnia  ondansetron Injectable 4 milliGRAM(s) IV Push every 6 hours PRN Nausea and/or Vomiting    Vital Signs Last 24 Hrs  T(C): 36.7 (24 Apr 2023 08:40), Max: 36.7 (23 Apr 2023 20:15)  T(F): 98.1 (24 Apr 2023 08:40), Max: 98.1 (23 Apr 2023 20:15)  HR: 82 (24 Apr 2023 08:40) (75 - 98)  BP: 115/77 (24 Apr 2023 08:40) (105/63 - 115/77)  BP(mean): --  RR: 18 (24 Apr 2023 08:40) (16 - 18)  SpO2: 97% (24 Apr 2023 08:40) (87% - 98%)    Parameters below as of 24 Apr 2023 08:40  Patient On (Oxygen Delivery Method): nasal cannula  O2 Flow (L/min): 4    PHYSICAL EXAM:  Constitutional: NAD, awake and alert, obese  HEENT:  EOMI,  Pupils round, No oral cyanosis.  Pulmonary: Non-labored, breath sounds are clear bilaterally  Cardiovascular: Irregular, normal rate  Gastrointestinal: Bowel Sounds present, soft, nontender.   Lymph: S/p BKA     LABS:                           12.6   10.21 )-----------( 162      ( 23 Apr 2023 06:44 )             39.3     04-24    134<L>  |  97  |  22  ----------------------------<  120<H>  3.2<L>   |  35<H>  |  0.88    Ca    8.4<L>      24 Apr 2023 06:44  Phos  4.0     04-23  Mg     2.0     04-23      - TroponinI hsT: <-15.54, <-15.75              12.5   7.52  )-----------( 140      ( 22 Apr 2023 00:41 )             37.4     133<L>  |  98  |  21  ----------------------------<  131<H>  3.6   |  31  |  0.92    Ca    8.9      23 Apr 2023 06:44  Phos  4.0     04-23  Mg     2.0     04-23    TPro  8.0  /  Alb  3.4  /  TBili  0.9  /  DBili  0.3  /  AST  42<H>  /  ALT  30  /  AlkPhos  117  04-22    TroponinI hsT: <-15.54, <-15.75    Tele: AF

## 2023-04-24 NOTE — PROGRESS NOTE ADULT - SUBJECTIVE AND OBJECTIVE BOX
Subjective:  awake and alert  refused BIPAP last nite    MEDICATIONS  (STANDING):  aspirin enteric coated 81 milliGRAM(s) Oral daily  budesonide 160 MICROgram(s)/formoterol 4.5 MICROgram(s) Inhaler 2 Puff(s) Inhalation two times a day  busPIRone 10 milliGRAM(s) Oral two times a day  dextrose 5%. 1000 milliLiter(s) (50 mL/Hr) IV Continuous <Continuous>  dextrose 5%. 1000 milliLiter(s) (100 mL/Hr) IV Continuous <Continuous>  dextrose 50% Injectable 12.5 Gram(s) IV Push once  dextrose 50% Injectable 25 Gram(s) IV Push once  dextrose 50% Injectable 25 Gram(s) IV Push once  diltiazem    milliGRAM(s) Oral daily  doxazosin 2 milliGRAM(s) Oral at bedtime  enoxaparin Injectable 40 milliGRAM(s) SubCutaneous every 24 hours  furosemide    Tablet 60 milliGRAM(s) Oral two times a day  gabapentin 400 milliGRAM(s) Oral three times a day  glucagon  Injectable 1 milliGRAM(s) IntraMuscular once  insulin lispro (ADMELOG) corrective regimen sliding scale   SubCutaneous three times a day before meals  metoprolol tartrate 25 milliGRAM(s) Oral two times a day  pantoprazole    Tablet 40 milliGRAM(s) Oral before breakfast  QUEtiapine 100 milliGRAM(s) Oral at bedtime  spironolactone 25 milliGRAM(s) Oral daily  thiamine 100 milliGRAM(s) Oral daily  tiotropium 2.5 MICROgram(s) Inhaler 2 Puff(s) Inhalation daily    MEDICATIONS  (PRN):  acetaminophen     Tablet .. 650 milliGRAM(s) Oral every 6 hours PRN Mild Pain (1 - 3)  albuterol    90 MICROgram(s) HFA Inhaler 2 Puff(s) Inhalation every 6 hours PRN Shortness of Breath and/or Wheezing  aluminum hydroxide/magnesium hydroxide/simethicone Suspension 30 milliLiter(s) Oral every 4 hours PRN Dyspepsia  dextrose Oral Gel 15 Gram(s) Oral once PRN Blood Glucose LESS THAN 70 milliGRAM(s)/deciliter  LORazepam   Injectable 1 milliGRAM(s) IV Push every 2 hours PRN CIWA-Ar score increase by 2 points and a total score of 7 or less  melatonin 3 milliGRAM(s) Oral at bedtime PRN Insomnia  ondansetron Injectable 4 milliGRAM(s) IV Push every 6 hours PRN Nausea and/or Vomiting      Allergies    Duricef (Rash)  Ceclor (Rash)    Intolerances        REVIEW OF SYSTEMS:    CONSTITUTIONAL:  As per HPI.  HEENT:  Eyes:  No diplopia or blurred vision. ENT:  No earache, sore throat or runny nose.  CARDIOVASCULAR:  No pressure, squeezing, tightness, heaviness or aching about the chest, neck, axilla or epigastrium.  RESPIRATORY:  No cough, shortness of breath, PND or orthopnea.  GASTROINTESTINAL:  No nausea, vomiting or diarrhea.  GENITOURINARY:  No dysuria, frequency or urgency.  MUSCULOSKELETAL:  no joint pain, deformity, tenderness  EXTREMITIES: no clubbing cyanosis,edema  SKIN:  No change in skin, hair or nails.  NEUROLOGIC:  No paresthesias, fasciculations, seizures or weakness.  PSYCHIATRIC:  No disorder of thought or mood.  ENDOCRINE:  No heat or cold intolerance, polyuria or polydipsia.  HEMATOLOGICAL:  No easy bruising or bleedings:    Vital Signs Last 24 Hrs  T(C): 36.4 (24 Apr 2023 04:45), Max: 36.7 (23 Apr 2023 20:15)  T(F): 97.6 (24 Apr 2023 04:45), Max: 98.1 (23 Apr 2023 20:15)  HR: 75 (24 Apr 2023 04:45) (75 - 98)  BP: 106/66 (24 Apr 2023 04:45) (105/63 - 121/49)  BP(mean): --  RR: 18 (24 Apr 2023 04:45) (16 - 18)  SpO2: 95% (24 Apr 2023 04:45) (87% - 98%)    Parameters below as of 24 Apr 2023 04:45  Patient On (Oxygen Delivery Method): nasal cannula  O2 Flow (L/min): 4      PHYSICAL EXAMINATION:  SKIN: no rashes  HEAD: NC/AT  EYES: PERRLA, EOMI  NECK:  Supple. No lymphadenopathy. Jugular venous pressure not elevated. Carotids equal.   HEART:   S1S2 reg  CHEST:  scattered ronchi; decreased bs bases  ABDOMEN:  Soft and nontender. obese  EXTREMITIES:  left BKA  NEURO: AAO x 3, no focal deficts       LABS:                        12.6   10.21 )-----------( 162      ( 23 Apr 2023 06:44 )             39.3     04-23    133<L>  |  98  |  21  ----------------------------<  131<H>  3.6   |  31  |  0.92    Ca    8.9      23 Apr 2023 06:44  Phos  4.0     04-23  Mg     2.0     04-23    TPro  8.0  /  Alb  3.4  /  TBili  0.9  /  DBili  0.3  /  AST  42<H>  /  ALT  30  /  AlkPhos  117  04-22          RADIOLOGY & ADDITIONAL TESTS:

## 2023-04-24 NOTE — PROGRESS NOTE ADULT - PROBLEM SELECTOR PLAN 1
Improved rate control with re-initiation of Rx missed prior to hospital admission; continue diltiazem and metoprolol; s/p Watchman Improved rate control with re-initiation of Rx missed prior to hospital admission; continue diltiazem and metoprolol; s/p Watchman    pt. stable, will sign off

## 2023-04-24 NOTE — PROGRESS NOTE ADULT - PROBLEM SELECTOR PLAN 2
Improved; can change Lasix from IV to PO route 4/24.
Improved; can change Lasix from IV to PO route 4/24.

## 2023-04-24 NOTE — PROGRESS NOTE ADULT - SUBJECTIVE AND OBJECTIVE BOX
HOSPITALIST ATTENDING PROGRESS NOTE    Chart and meds reviewed.  Patient seen and examined.    CC: near syncope    Subjective: Denies CP, SOB. Reports does not have portable oxygen at home.     All other systems reviewed and found to be negative with the exception of what has been described above.    MEDICATIONS  (STANDING):  aspirin enteric coated 81 milliGRAM(s) Oral daily  budesonide 160 MICROgram(s)/formoterol 4.5 MICROgram(s) Inhaler 2 Puff(s) Inhalation two times a day  busPIRone 10 milliGRAM(s) Oral two times a day  diltiazem    milliGRAM(s) Oral daily  doxazosin 2 milliGRAM(s) Oral at bedtime  enoxaparin Injectable 40 milliGRAM(s) SubCutaneous every 24 hours  furosemide    Tablet 60 milliGRAM(s) Oral two times a day  gabapentin 400 milliGRAM(s) Oral three times a day  metoprolol tartrate 25 milliGRAM(s) Oral two times a day  pantoprazole    Tablet 40 milliGRAM(s) Oral before breakfast  QUEtiapine 100 milliGRAM(s) Oral at bedtime  spironolactone 25 milliGRAM(s) Oral daily  thiamine 100 milliGRAM(s) Oral daily  tiotropium 2.5 MICROgram(s) Inhaler 2 Puff(s) Inhalation daily    MEDICATIONS  (PRN):  acetaminophen     Tablet .. 650 milliGRAM(s) Oral every 6 hours PRN Mild Pain (1 - 3)  albuterol    90 MICROgram(s) HFA Inhaler 2 Puff(s) Inhalation every 6 hours PRN Shortness of Breath and/or Wheezing  aluminum hydroxide/magnesium hydroxide/simethicone Suspension 30 milliLiter(s) Oral every 4 hours PRN Dyspepsia  melatonin 3 milliGRAM(s) Oral at bedtime PRN Insomnia  ondansetron Injectable 4 milliGRAM(s) IV Push every 6 hours PRN Nausea and/or Vomiting      VITALS:  T(F): 98.1 (04-24-23 @ 08:40), Max: 98.1 (04-23-23 @ 20:15)  HR: 85 (04-24-23 @ 09:37) (75 - 98)  BP: 115/77 (04-24-23 @ 08:40) (105/63 - 115/77)  RR: 18 (04-24-23 @ 08:40) (16 - 18)  SpO2: 97% (04-24-23 @ 08:40) (87% - 98%)  Wt(kg): --    I&O's Summary    23 Apr 2023 07:01  -  24 Apr 2023 07:00  --------------------------------------------------------  IN: 0 mL / OUT: 1125 mL / NET: -1125 mL    24 Apr 2023 07:01  -  24 Apr 2023 16:52  --------------------------------------------------------  IN: 0 mL / OUT: 1550 mL / NET: -1550 mL        CAPILLARY BLOOD GLUCOSE      POCT Blood Glucose.: 115 mg/dL (24 Apr 2023 07:27)  POCT Blood Glucose.: 132 mg/dL (23 Apr 2023 23:17)      PHYSICAL EXAM:  Gen: NAD  HEENT:  pupils equal and reactive, EOMI, no oropharyngeal lesions, erythema, exudates, oral thrush  NECK:   supple, no carotid bruits, no palpable lymph nodes, no thyromegaly  CV:  +S1, +S2, regular, no murmurs or rubs  RESP:   lungs clear to auscultation bilaterally, no wheezing, rales, rhonchi, good air entry bilaterally  BREAST:  not examined  GI:  abdomen soft, non-tender, non-distended, normal BS, no bruits, no abdominal masses, no palpable masses  RECTAL:  not examined  :  not examined  MSK:   normal muscle tone, no atrophy, no rigidity, no contractions  EXT:  + L BKA  VASCULAR:  pulses equal and symmetric in the upper and lower extremities  NEURO:  AAOX3, no focal neurological deficits, follows all commands, able to move extremities spontaneously  SKIN:  no ulcers, lesions or rashes    LABS:                            12.6   10.21 )-----------( 162      ( 23 Apr 2023 06:44 )             39.3     04-24    134<L>  |  97  |  22  ----------------------------<  120<H>  3.2<L>   |  35<H>  |  0.88    Ca    8.4<L>      24 Apr 2023 06:44  Phos  4.0     04-23  Mg     2.0     04-23                ABG - ( 23 Apr 2023 11:26 )  pH, Arterial: 7.43  pH, Blood: x     /  pCO2: 53    /  pO2: 92    / HCO3: 35    / Base Excess: 9.1   /  SaO2: 98        CULTURES:  no new    Additional results/Imaging, I have personally reviewed:  CXR 4/22/23: No large airspace consolidation or effusion.

## 2023-04-25 ENCOUNTER — TRANSCRIPTION ENCOUNTER (OUTPATIENT)
Age: 68
End: 2023-04-25

## 2023-04-25 VITALS — OXYGEN SATURATION: 97 %

## 2023-04-25 PROCEDURE — 99232 SBSQ HOSP IP/OBS MODERATE 35: CPT

## 2023-04-25 PROCEDURE — 99239 HOSP IP/OBS DSCHRG MGMT >30: CPT

## 2023-04-25 RX ORDER — FUROSEMIDE 40 MG
3 TABLET ORAL
Qty: 180 | Refills: 0
Start: 2023-04-25 | End: 2023-05-24

## 2023-04-25 RX ORDER — FUROSEMIDE 40 MG
1 TABLET ORAL
Qty: 0 | Refills: 0 | DISCHARGE

## 2023-04-25 RX ORDER — FUROSEMIDE 40 MG
3 TABLET ORAL
Qty: 90 | Refills: 0
Start: 2023-04-25 | End: 2023-05-24

## 2023-04-25 RX ADMIN — Medication 650 MILLIGRAM(S): at 06:06

## 2023-04-25 RX ADMIN — GABAPENTIN 400 MILLIGRAM(S): 400 CAPSULE ORAL at 13:29

## 2023-04-25 RX ADMIN — Medication 81 MILLIGRAM(S): at 10:40

## 2023-04-25 RX ADMIN — ALBUTEROL 2 PUFF(S): 90 AEROSOL, METERED ORAL at 08:12

## 2023-04-25 RX ADMIN — Medication 650 MILLIGRAM(S): at 13:29

## 2023-04-25 RX ADMIN — ENOXAPARIN SODIUM 40 MILLIGRAM(S): 100 INJECTION SUBCUTANEOUS at 16:11

## 2023-04-25 RX ADMIN — Medication 25 MILLIGRAM(S): at 10:40

## 2023-04-25 RX ADMIN — Medication 120 MILLIGRAM(S): at 10:40

## 2023-04-25 RX ADMIN — Medication 60 MILLIGRAM(S): at 06:04

## 2023-04-25 RX ADMIN — BUDESONIDE AND FORMOTEROL FUMARATE DIHYDRATE 2 PUFF(S): 160; 4.5 AEROSOL RESPIRATORY (INHALATION) at 08:12

## 2023-04-25 RX ADMIN — SPIRONOLACTONE 25 MILLIGRAM(S): 25 TABLET, FILM COATED ORAL at 10:40

## 2023-04-25 RX ADMIN — Medication 10 MILLIGRAM(S): at 10:40

## 2023-04-25 RX ADMIN — GABAPENTIN 400 MILLIGRAM(S): 400 CAPSULE ORAL at 06:05

## 2023-04-25 RX ADMIN — Medication 60 MILLIGRAM(S): at 13:28

## 2023-04-25 RX ADMIN — Medication 100 MILLIGRAM(S): at 10:40

## 2023-04-25 RX ADMIN — PANTOPRAZOLE SODIUM 40 MILLIGRAM(S): 20 TABLET, DELAYED RELEASE ORAL at 06:04

## 2023-04-25 NOTE — DISCHARGE NOTE PROVIDER - PROVIDER TOKENS
PROVIDER:[TOKEN:[[id number]],FOLLOWUP:[1 week]],PROVIDER:[TOKEN:[[id number]],FOLLOWUP:[2 weeks]]

## 2023-04-25 NOTE — SBIRT NOTE ADULT - NSSBIRTALCPOSREINDET_GEN_A_CORE
Reinforced importance of adhering to guidelines provided for healthy ETOH consumption. Pt verbalizes understanding Pt does verbalize increase in ETOH during recent birthday celebrations, however maintains ETOH 2-3x/mo

## 2023-04-25 NOTE — PHYSICAL THERAPY INITIAL EVALUATION ADULT - PRECAUTIONS/LIMITATIONS, REHAB EVAL
Tele/cardiac precautions/oxygen therapy device and L/min Tele, O2 N 4 L/min NC/cardiac precautions/oxygen therapy device and L/min Tele, O2 N 4 L/min NC/cardiac precautions/isolation precautions/oxygen therapy device and L/min

## 2023-04-25 NOTE — DISCHARGE NOTE NURSING/CASE MANAGEMENT/SOCIAL WORK - NSDCPEFALRISK_GEN_ALL_CORE
For information on Fall & Injury Prevention, visit: [email or web address] OR  [email or web address] OR  https://www.cdc.gov/steadi/patient.html

## 2023-04-25 NOTE — PROGRESS NOTE ADULT - SUBJECTIVE AND OBJECTIVE BOX
Subjective:  awake and alert  no distress    MEDICATIONS  (STANDING):  aspirin enteric coated 81 milliGRAM(s) Oral daily  budesonide 160 MICROgram(s)/formoterol 4.5 MICROgram(s) Inhaler 2 Puff(s) Inhalation two times a day  busPIRone 10 milliGRAM(s) Oral two times a day  diltiazem    milliGRAM(s) Oral daily  doxazosin 2 milliGRAM(s) Oral at bedtime  enoxaparin Injectable 40 milliGRAM(s) SubCutaneous every 24 hours  furosemide    Tablet 60 milliGRAM(s) Oral two times a day  gabapentin 400 milliGRAM(s) Oral three times a day  metoprolol tartrate 25 milliGRAM(s) Oral two times a day  pantoprazole    Tablet 40 milliGRAM(s) Oral before breakfast  QUEtiapine 100 milliGRAM(s) Oral at bedtime  spironolactone 25 milliGRAM(s) Oral daily  thiamine 100 milliGRAM(s) Oral daily  tiotropium 2.5 MICROgram(s) Inhaler 2 Puff(s) Inhalation daily    MEDICATIONS  (PRN):  acetaminophen     Tablet .. 650 milliGRAM(s) Oral every 6 hours PRN Mild Pain (1 - 3)  albuterol    90 MICROgram(s) HFA Inhaler 2 Puff(s) Inhalation every 6 hours PRN Shortness of Breath and/or Wheezing  aluminum hydroxide/magnesium hydroxide/simethicone Suspension 30 milliLiter(s) Oral every 4 hours PRN Dyspepsia  melatonin 3 milliGRAM(s) Oral at bedtime PRN Insomnia  ondansetron Injectable 4 milliGRAM(s) IV Push every 6 hours PRN Nausea and/or Vomiting      Allergies    Duricef (Rash)  Ceclor (Rash)    Intolerances        REVIEW OF SYSTEMS:    CONSTITUTIONAL:  As per HPI.  HEENT:  Eyes:  No diplopia or blurred vision. ENT:  No earache, sore throat or runny nose.  CARDIOVASCULAR:  No pressure, squeezing, tightness, heaviness or aching about the chest, neck, axilla or epigastrium.  RESPIRATORY:  No cough, shortness of breath, PND or orthopnea.  GASTROINTESTINAL:  No nausea, vomiting or diarrhea.  GENITOURINARY:  No dysuria, frequency or urgency.  MUSCULOSKELETAL:  no joint pain, deformity, tenderness  EXTREMITIES: no clubbing cyanosis,edema  SKIN:  No change in skin, hair or nails.  NEUROLOGIC:  No paresthesias, fasciculations, seizures or weakness.  PSYCHIATRIC:  No disorder of thought or mood.  ENDOCRINE:  No heat or cold intolerance, polyuria or polydipsia.  HEMATOLOGICAL:  No easy bruising or bleedings:    Vital Signs Last 24 Hrs  T(C): 36.9 (25 Apr 2023 07:46), Max: 36.9 (25 Apr 2023 07:46)  T(F): 98.5 (25 Apr 2023 07:46), Max: 98.5 (25 Apr 2023 07:46)  HR: 99 (25 Apr 2023 07:46) (82 - 99)  BP: 121/63 (25 Apr 2023 07:46) (103/55 - 121/63)  BP(mean): --  RR: 18 (25 Apr 2023 07:46) (17 - 18)  SpO2: 100% (25 Apr 2023 07:46) (92% - 100%)    Parameters below as of 25 Apr 2023 07:46  Patient On (Oxygen Delivery Method): nasal cannula  O2 Flow (L/min): 4      PHYSICAL EXAMINATION:  SKIN: no rashes  HEAD: NC/AT  EYES: PERRLA, EOMI  NECK:  Supple. No lymphadenopathy. Jugular venous pressure not elevated. Carotids equal.   HEART:   S1S2 reg  CHEST:  scattered ronchi  ABDOMEN:  Soft and nontender. obese  EXTREMITIES:  left bka  NEURO: AAO x 3, no focal deficts       LABS:    04-24    134<L>  |  97  |  22  ----------------------------<  120<H>  3.2<L>   |  35<H>  |  0.88    Ca    8.4<L>      24 Apr 2023 06:44            RADIOLOGY & ADDITIONAL TESTS:

## 2023-04-25 NOTE — DISCHARGE NOTE PROVIDER - NSDCFUSCHEDAPPT_GEN_ALL_CORE_FT
Brittani Daley  [facility] Physician [facility]  CARDIOLOGY [address]  Scheduled Appointment: 05/10/2023    Dominick Middleton  [facility]  CARDIOLOGY [address]  Scheduled Appointment: 05/10/2023    Wicho Mckenna  [facility]  INTMED [address]  Scheduled Appointment: 07/12/2023

## 2023-04-25 NOTE — PHYSICAL THERAPY INITIAL EVALUATION ADULT - GENERAL OBSERVATIONS, REHAB EVAL
Pt found sitting OOB in chair with tele monitor, O2 4 L/min NC, and prosthetic left LE. Noted redness and blister medial aspect of right lower leg, RN informed. Noted min-mod edema right lower leg.

## 2023-04-25 NOTE — DISCHARGE NOTE PROVIDER - NSDCPNSUBOBJ_GEN_ALL_CORE
VITALS:  T(F): 98.5 (04-25-23 @ 07:46), Max: 98.5 (04-25-23 @ 07:46)  HR: 66 (04-25-23 @ 08:16) (66 - 99)  BP: 121/63 (04-25-23 @ 07:46) (103/55 - 121/63)  RR: 18 (04-25-23 @ 07:46) (17 - 18)  SpO2: 97% (04-25-23 @ 08:16) (92% - 100%)    PHYSICAL EXAM:  Gen: NAD  HEENT:  pupils equal and reactive, EOMI, no oropharyngeal lesions, erythema, exudates, oral thrush  NECK:   supple, no carotid bruits, no palpable lymph nodes, no thyromegaly  CV:  +S1, +S2, regular, no murmurs or rubs  RESP:   lungs clear to auscultation bilaterally, no wheezing, rales, rhonchi, good air entry bilaterally  BREAST:  not examined  GI:  abdomen soft, non-tender, non-distended, normal BS, no bruits, no abdominal masses, no palpable masses  RECTAL:  not examined  :  not examined  MSK:   normal muscle tone, no atrophy, no rigidity, no contractions  EXT:  no clubbing, no cyanosis, no edema, no calf pain, swelling or erythema  VASCULAR:  pulses equal and symmetric in the upper and lower extremities  NEURO:  AAOX3, no focal neurological deficits, follows all commands, able to move extremities spontaneously  SKIN:  no ulcers, lesions or rashes    CXR 4/22/23: No large airspace consolidation or effusion.

## 2023-04-25 NOTE — DISCHARGE NOTE PROVIDER - CARE PROVIDER_API CALL
Augusta Hernandez)  Family Medicine  [facility] [place] [address]  Phone: [phone]  Fax: [phone]  Follow Up Time: 1 week    Wicho Mckenna)  Internal Medicine; Pulmonary Disease  [address], [address]  [place] [address]  Phone: [phone]  Fax: [phone]  Follow Up Time: 2 weeks

## 2023-04-25 NOTE — DISCHARGE NOTE PROVIDER - HOSPITAL COURSE
CC: near syncope  HPI and Hospital Course: 68-year-old male with history of atrial fibrillation, CHF, cirrhosis, COPD on home O2 as needed, alcohol abuse presents for progressively worsening shortness of breath, orthopnea and dyspnea on exertion over the past couple of days.  Patient states that it has been worse over the past 24 hours.    #Acute hypoxic resp failure 2/2 acute on chronic HFpEF  -qualifies for home O2, does not have portable at home, given prior to d/c here  -trops neg  -echo as above  -s/p iv diuresis, now on oral  -appreciate cards input    #hx EtOH use  -no s/s w/d here, d/c CIWA    #COPD  -inhalers    #VIJAY  -not tolerating CPAP qhs here    #DVT ppx- SQL    For AL, F2F  I have spent 34 min on day of d/c coordinating care.

## 2023-04-25 NOTE — PHYSICAL THERAPY INITIAL EVALUATION ADULT - PERTINENT HX OF CURRENT PROBLEM, REHAB EVAL
Pt admitted to  secondary to syncope, SOB, orthopnea, and ALANIZ. Pt admitted to  secondary to syncope, SOB, orthopnea, and ALANIZ. Pt with a hx of left BKA.

## 2023-04-25 NOTE — SBIRT NOTE ADULT - NSSBIRTDRGPOSREINDET_GEN_A_CORE
Reinforced importance of using drugs for prescribed medical reasons only.  Pt verbalizes understanding

## 2023-04-25 NOTE — DISCHARGE NOTE PROVIDER - CARE PROVIDERS DIRECT ADDRESSES
[email or web address]

## 2023-04-25 NOTE — PROGRESS NOTE ADULT - ASSESSMENT
68-year-old male with history of atrial fibrillation, CHF, cirrhosis, COPD on home O2 as needed, alcohol abuse presents for progressively worsening shortness of breath, orthopnea and dyspnea on exertion over the past couple of days.  Patient states that it has been worse over the past 24 hours.    #Acute hypoxic resp failure 2/2 acute on chronic HFpEF  -qualifies for home O2, does not have portable at home   -trops neg  -echo as above  -s/p iv diuresis, now on oral  -appreciate cards input    #hx EtOH use  -no s/s w/d here, d/c CIWA    #COPD  -inhalers    #VIJAY  -not tolerating CPAP qhs here    #DVT ppx- SQL    From AL, dispo pending PT and set up of home O2, likely 4/25  Outpt known to Bala Middleton    
Assessment/Plan    - cont O2  - refused BIPAP; has refused as outpatient  - cont symbicort/ spiriva  - cxr shows pulmonary congestion  - cont lasix  - dvt proph
Assessment/Plan    - cont O2; has at home  - refused BIPAP; has refused as outpatient  - on symbicort/ spiriva  - cxr shows pulmonary congestion  - cont lasix  - possible discharge  - dvt proph

## 2023-04-25 NOTE — PHYSICAL THERAPY INITIAL EVALUATION ADULT - PLANNED THERAPY INTERVENTIONS, PT EVAL
Eval, amb, transfers, bed mobility x 10'.  Pt left OOB  in chair with all observation section equipment/material intact and bed alarm activated. Pt with no c/o pain. Will cont to follow pt and increase as tolerated./bed mobility training/gait training/transfer training

## 2023-04-25 NOTE — DISCHARGE NOTE PROVIDER - NSDCMRMEDTOKEN_GEN_ALL_CORE_FT
albuterol 90 mcg/inh inhalation aerosol: 2 puff(s) inhaled every 4 hours, As Needed for wheezing  Aspirin Enteric Coated 81 mg oral delayed release tablet: 1 tab(s) orally once a day  budesonide-formoterol 160 mcg-4.5 mcg/inh inhalation aerosol: 2 puff(s) inhaled 2 times a day  busPIRone 10 mg oral tablet: 1 tab(s) orally 2 times a day  Colace 100 mg oral capsule: 2 cap(s) orally once a day  cyanocobalamin 1000 mcg oral tablet: 1 tab(s) orally once a day  dilTIAZem 120 mg/24 hours oral capsule, extended release: 1 cap(s) orally once a day  doxazosin 2 mg oral tablet: 1 tab(s) orally once a day (at bedtime)  furosemide 20 mg oral tablet: 3 tab(s) orally 2 times a day  gabapentin 400 mg oral capsule: 1 cap(s) orally 3 times a day  Metoprolol Tartrate 25 mg oral tablet: 1 tab(s) orally 2 times a day  omeprazole 40 mg oral delayed release capsule: 1 cap(s) orally once a day  psyllium oral capsule: 2 cap(s) orally once a day  QUEtiapine 50 mg oral tablet: 2 tab(s) orally once a day (at bedtime)  spironolactone 25 mg oral tablet: 1 tab(s) orally once a day  thiamine 100 mg oral tablet: 1 tab(s) orally once a day

## 2023-04-25 NOTE — DISCHARGE NOTE PROVIDER - NSDCCPCAREPLAN_GEN_ALL_CORE_FT
PRINCIPAL DISCHARGE DIAGNOSIS  Diagnosis: ALANIZ (dyspnea on exertion)  Assessment and Plan of Treatment: Take lasix 60mg twice a day. You have been set up with portable home oxygen.

## 2023-04-25 NOTE — PHYSICAL THERAPY INITIAL EVALUATION ADULT - ACTIVE RANGE OF MOTION EXAMINATION, REHAB EVAL
Left BKA ROM WFL./Left UE Active ROM was WFL (within functional limits)/Right UE Active ROM was WFL (within functional limits)/Left LE Active ROM was WFL (within functional limits)/Right LE Active ROM was WFL (within functional limits)

## 2023-04-25 NOTE — DISCHARGE NOTE NURSING/CASE MANAGEMENT/SOCIAL WORK - NSDCVIVACCINE_GEN_ALL_CORE_FT
influenza, injectable, quadrivalent, preservative free; 21-Nov-2019 14:53; Cathryn Burks (RN); [employer]; [id number] (Exp. Date: 30-Jun-2020); IntraMuscular; Deltoid Left.; 0.5 milliLiter(s); VIS (VIS Published: 15-Aug-2019, VIS Presented: 21-Nov-2019);   influenza, injectable, quadrivalent, preservative free; 17-Oct-2020 16:13; Bhavya Brown (RN); [employer]; [id number] (Exp. Date: 30-Jun-2021); IntraMuscular; Deltoid Left.; 0.5 milliLiter(s); VIS (VIS Published: 15-Aug-2019, VIS Presented: 17-Oct-2020);

## 2023-04-25 NOTE — DISCHARGE NOTE PROVIDER - NSDCCAREPROVSEEN_GEN_ALL_CORE_FT
Rasheeda Hackett (\[facility]\"" medicine)  Wicho Mckenna (pulmonology)  Tien King (cardiology)

## 2023-04-28 DIAGNOSIS — Z91.198 PATIENT'S NONCOMPLIANCE WITH OTHER MEDICAL TREATMENT AND REGIMEN FOR OTHER REASON: ICD-10-CM

## 2023-04-28 DIAGNOSIS — Z86.73 PERSONAL HISTORY OF TRANSIENT ISCHEMIC ATTACK (TIA), AND CEREBRAL INFARCTION WITHOUT RESIDUAL DEFICITS: ICD-10-CM

## 2023-04-28 DIAGNOSIS — K21.9 GASTRO-ESOPHAGEAL REFLUX DISEASE WITHOUT ESOPHAGITIS: ICD-10-CM

## 2023-04-28 DIAGNOSIS — Z20.822 CONTACT WITH AND (SUSPECTED) EXPOSURE TO COVID-19: ICD-10-CM

## 2023-04-28 DIAGNOSIS — Z89.512 ACQUIRED ABSENCE OF LEFT LEG BELOW KNEE: ICD-10-CM

## 2023-04-28 DIAGNOSIS — Z79.82 LONG TERM (CURRENT) USE OF ASPIRIN: ICD-10-CM

## 2023-04-28 DIAGNOSIS — F10.10 ALCOHOL ABUSE, UNCOMPLICATED: ICD-10-CM

## 2023-04-28 DIAGNOSIS — Z88.1 ALLERGY STATUS TO OTHER ANTIBIOTIC AGENTS STATUS: ICD-10-CM
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
## 2023-04-28 DIAGNOSIS — Z79.01 LONG TERM (CURRENT) USE OF ANTICOAGULANTS: ICD-10-CM

## 2023-04-28 DIAGNOSIS — I27.20 PULMONARY HYPERTENSION, UNSPECIFIED: ICD-10-CM

## 2023-04-28 DIAGNOSIS — J43.9 EMPHYSEMA, UNSPECIFIED: ICD-10-CM

## 2023-04-28 DIAGNOSIS — J96.01 ACUTE RESPIRATORY FAILURE WITH HYPOXIA: ICD-10-CM

## 2023-04-28 DIAGNOSIS — I11.0 HYPERTENSIVE HEART DISEASE WITH HEART FAILURE: ICD-10-CM

## 2023-04-28 DIAGNOSIS — I34.0 NONRHEUMATIC MITRAL (VALVE) INSUFFICIENCY: ICD-10-CM

## 2023-04-28 DIAGNOSIS — I50.33 ACUTE ON CHRONIC DIASTOLIC (CONGESTIVE) HEART FAILURE: ICD-10-CM

## 2023-04-28 DIAGNOSIS — K74.60 UNSPECIFIED CIRRHOSIS OF LIVER: ICD-10-CM

## 2023-04-28 DIAGNOSIS — Z99.81 DEPENDENCE ON SUPPLEMENTAL OXYGEN: ICD-10-CM

## 2023-04-28 DIAGNOSIS — E66.2 MORBID (SEVERE) OBESITY WITH ALVEOLAR HYPOVENTILATION: ICD-10-CM

## 2023-04-28 DIAGNOSIS — I48.21 PERMANENT ATRIAL FIBRILLATION: ICD-10-CM

## 2023-05-01 ENCOUNTER — APPOINTMENT (OUTPATIENT)
Dept: FAMILY MEDICINE | Facility: CLINIC | Age: 68
End: 2023-05-01
Payer: MEDICARE

## 2023-05-01 VITALS
BODY MASS INDEX: 41.85 KG/M2 | HEIGHT: 72 IN | DIASTOLIC BLOOD PRESSURE: 70 MMHG | HEART RATE: 96 BPM | SYSTOLIC BLOOD PRESSURE: 130 MMHG | OXYGEN SATURATION: 93 % | WEIGHT: 309 LBS

## 2023-05-01 DIAGNOSIS — K74.60 UNSPECIFIED CIRRHOSIS OF LIVER: ICD-10-CM

## 2023-05-01 DIAGNOSIS — D69.6 THROMBOCYTOPENIA, UNSPECIFIED: ICD-10-CM

## 2023-05-01 DIAGNOSIS — E87.6 HYPOKALEMIA: ICD-10-CM

## 2023-05-01 PROCEDURE — 99496 TRANSJ CARE MGMT HIGH F2F 7D: CPT

## 2023-05-01 NOTE — ASSESSMENT
[FreeTextEntry1] : # CHF\par - Hospitalization and discharge summary discussed and reviewed\par - C/w current medication regimen \par - Labs drawn in office today\par - Pt will f/u with cardio on 05/10/23\par \par # COPD\par - Adv to c/w using portable oxygen at all times\par - Pt will f/u with pulm in June 2023

## 2023-05-01 NOTE — HISTORY OF PRESENT ILLNESS
[Post-hospitalization from ___ Hospital] : Post-hospitalization from [unfilled] Hospital [Admitted on: ___] : The patient was admitted on [unfilled] [Discharged on ___] : discharged on [unfilled] [FreeTextEntry2] : Sona is a 68 year male here today for HFU. He denied any acute complaints today. Pt reported on 04/22/23, he almost had a syncopic episode when experiencing worsening SOB, orthopnea, and dyspnea after drinking alcohol/holding on medications. Patient's daughter noted he did not take his medications for 2 days when drinking alcohol. He reported he was transported to [facility] via ambulance after calling his daughter. Pt confirmed he saw cardiology and pulmonology during his hospital stay. Pt underwent IV diuresis. He was dx with ALANIZ and was told to continue with his current medication regimen. He was also given a portable oxygen unit and is to stay on oxygen at all times. He was discharged on 04/25/23 and is to f/u with cardio Dr. Middleton on 05/10/23. Patient's daughter confirmed there is a nurse who comes to check vitals once a week. He will f/u with pulm in June 2023.\par \par Pt does not use a CPAP machine as he cannot tolerate it. He reported his hemorrhoids have somewhat improved though he still has some "spotting."

## 2023-05-01 NOTE — PLAN
[FreeTextEntry1] : - Labs drawn in office today\par - Hospitalization/discharge summary discussed and reviewed\par - C/w current medication regimen \par - C/w portable oxygen use at all times \par - F/u as per needed

## 2023-05-01 NOTE — END OF VISIT
[FreeTextEntry3] : Medical record entries made by the scribe today today, were at my direction and personally dictated to them by me, Dr. Augusta Vasquez on May 1, 2023. I have reviewed the chart and agree that the record accurately reflects my personal performance of the history, physical exam, assessment, and plan.\par

## 2023-05-04 LAB
ALBUMIN SERPL ELPH-MCNC: 3.8 G/DL
ALP BLD-CCNC: 100 U/L
ALT SERPL-CCNC: 13 U/L
ANION GAP SERPL CALC-SCNC: 13 MMOL/L
AST SERPL-CCNC: 12 U/L
BASOPHILS # BLD AUTO: 0.02 K/UL
BASOPHILS NFR BLD AUTO: 0.2 %
BILIRUB SERPL-MCNC: 0.5 MG/DL
BUN SERPL-MCNC: 12 MG/DL
CALCIUM SERPL-MCNC: 9.1 MG/DL
CHLORIDE SERPL-SCNC: 101 MMOL/L
CHOLEST SERPL-MCNC: 166 MG/DL
CO2 SERPL-SCNC: 26 MMOL/L
CREAT SERPL-MCNC: 0.88 MG/DL
EGFR: 95 ML/MIN/1.73M2
EOSINOPHIL # BLD AUTO: 0.14 K/UL
EOSINOPHIL NFR BLD AUTO: 1.5 %
ESTIMATED AVERAGE GLUCOSE: 120 MG/DL
FOLATE SERPL-MCNC: 6.8 NG/ML
GLUCOSE SERPL-MCNC: 119 MG/DL
HBA1C MFR BLD HPLC: 5.8 %
HCT VFR BLD CALC: 36.9 %
HDLC SERPL-MCNC: 52 MG/DL
HGB BLD-MCNC: 11.6 G/DL
IMM GRANULOCYTES NFR BLD AUTO: 0.3 %
LDLC SERPL CALC-MCNC: 102 MG/DL
LYMPHOCYTES # BLD AUTO: 1.02 K/UL
LYMPHOCYTES NFR BLD AUTO: 11.3 %
MAGNESIUM SERPL-MCNC: 1.5 MG/DL
MAN DIFF?: NORMAL
MCHC RBC-ENTMCNC: 31.4 GM/DL
MCHC RBC-ENTMCNC: 31.4 PG
MCV RBC AUTO: 100 FL
MONOCYTES # BLD AUTO: 0.68 K/UL
MONOCYTES NFR BLD AUTO: 7.5 %
NEUTROPHILS # BLD AUTO: 7.17 K/UL
NEUTROPHILS NFR BLD AUTO: 79.2 %
NONHDLC SERPL-MCNC: 114 MG/DL
PHOSPHATE SERPL-MCNC: 2.9 MG/DL
PLATELET # BLD AUTO: 139 K/UL
POTASSIUM SERPL-SCNC: 4 MMOL/L
PROT SERPL-MCNC: 6.1 G/DL
RBC # BLD: 3.69 M/UL
RBC # FLD: 14.1 %
SODIUM SERPL-SCNC: 140 MMOL/L
TRIGL SERPL-MCNC: 61 MG/DL
TSH SERPL-ACNC: 1.81 UIU/ML
VIT B12 SERPL-MCNC: 728 PG/ML
WBC # FLD AUTO: 9.06 K/UL

## 2023-05-10 ENCOUNTER — APPOINTMENT (OUTPATIENT)
Dept: CARDIOLOGY | Facility: CLINIC | Age: 68
End: 2023-05-10
Payer: MEDICARE

## 2023-05-10 ENCOUNTER — APPOINTMENT (OUTPATIENT)
Dept: CARDIOLOGY | Facility: CLINIC | Age: 68
End: 2023-05-10

## 2023-05-10 ENCOUNTER — NON-APPOINTMENT (OUTPATIENT)
Age: 68
End: 2023-05-10

## 2023-05-10 VITALS
DIASTOLIC BLOOD PRESSURE: 62 MMHG | HEART RATE: 90 BPM | SYSTOLIC BLOOD PRESSURE: 120 MMHG | BODY MASS INDEX: 42.66 KG/M2 | OXYGEN SATURATION: 90 % | HEIGHT: 72 IN | WEIGHT: 315 LBS

## 2023-05-10 PROCEDURE — 99214 OFFICE O/P EST MOD 30 MIN: CPT | Mod: GC

## 2023-05-10 PROCEDURE — 93000 ELECTROCARDIOGRAM COMPLETE: CPT | Mod: GC

## 2023-05-10 RX ORDER — POLYETHYLENE GLYCOL 3350 17 G/17G
17 POWDER, FOR SOLUTION ORAL DAILY
Qty: 1 | Refills: 0 | Status: DISCONTINUED | COMMUNITY
Start: 2022-11-07 | End: 2023-05-10

## 2023-05-10 RX ORDER — DOCUSATE SODIUM 100 MG/1
100 CAPSULE, LIQUID FILLED ORAL TWICE DAILY
Qty: 60 | Refills: 0 | Status: COMPLETED | COMMUNITY
Start: 2022-11-07 | End: 2023-05-10

## 2023-05-10 RX ORDER — ALPRAZOLAM 0.5 MG/1
0.5 TABLET ORAL
Qty: 10 | Refills: 0 | Status: DISCONTINUED | COMMUNITY
Start: 2022-07-12 | End: 2023-05-10

## 2023-05-10 RX ORDER — HYDROCORTISONE ACETATE 25 MG/1
25 SUPPOSITORY RECTAL
Qty: 12 | Refills: 0 | Status: DISCONTINUED | COMMUNITY
Start: 2022-12-05 | End: 2023-05-10

## 2023-05-10 RX ORDER — SENNOSIDES 8.6 MG/1
8.6 TABLET ORAL DAILY
Qty: 180 | Refills: 1 | Status: DISCONTINUED | COMMUNITY
Start: 2021-08-24 | End: 2023-05-10

## 2023-05-10 RX ORDER — HYDROCORTISONE ACETATE 30 MG/1
30 SUPPOSITORY RECTAL TWICE DAILY
Qty: 1 | Refills: 3 | Status: DISCONTINUED | COMMUNITY
Start: 2022-11-07 | End: 2023-05-10

## 2023-05-11 ENCOUNTER — RX RENEWAL (OUTPATIENT)
Age: 68
End: 2023-05-11

## 2023-05-12 NOTE — PROGRESS NOTE ADULT - ASSESSMENT
Reasonable candidate for Another PEG placement.  Please stop Aspirin.  Chronic A Fib with Rapid VR currently.  Needs to be under good control prior to any endoscopic procedure.  Hope for later this week. Will speak to family.  ASA #3. Detail Level: None

## 2023-05-17 NOTE — ASSESSMENT
[FreeTextEntry1] : A/P\par \par *HFpEF\par -increase lasix from 80 BID to\par 80 AM, 40 noon, 80 PM\par \par Return in 2 weeks w/ basic chem b/f visit.

## 2023-05-17 NOTE — PHYSICAL EXAM
[Well Developed] : well developed [Well Nourished] : well nourished [No Acute Distress] : no acute distress [Normal Conjunctiva] : normal conjunctiva [Normal Venous Pressure] : normal venous pressure [No Carotid Bruit] : no carotid bruit [Normal S1, S2] : normal S1, S2 [No Murmur] : no murmur [No Rub] : no rub [Clear Lung Fields] : clear lung fields [No Gallop] : no gallop [Good Air Entry] : good air entry [No Respiratory Distress] : no respiratory distress  [Soft] : abdomen soft [Non Tender] : non-tender [No Masses/organomegaly] : no masses/organomegaly [Normal Gait] : normal gait [Normal Bowel Sounds] : normal bowel sounds [No Cyanosis] : no cyanosis [No Clubbing] : no clubbing [No Rash] : no rash [No Varicosities] : no varicosities [No Skin Lesions] : no skin lesions [Moves all extremities] : moves all extremities [No Focal Deficits] : no focal deficits [Normal Speech] : normal speech [Alert and Oriented] : alert and oriented [Normal memory] : normal memory [de-identified] : bilat. LE edema.

## 2023-05-17 NOTE — HISTORY OF PRESENT ILLNESS
[FreeTextEntry1] : 65 y/o w/\par \par *HFpEF\par *afib-permanent\par *s/p CVA-per MRI study '17-"mild chronic microvascular changes"\par *s/p watchman Apr '19-due to fall risk\par *HTN\par *Obesity-BMI=40\par *VIJAY noncompliant w/ CPAP\par *ETOHism\par *recurrent falls s/p BKA\par \par here for followup.\par Recently admitted for HFpEF exacerbation due to \par  ETOH relapse & med noncompliance.\par Diuresed w/ lasix & discharged.\par Here for followup after hospital admit.\par Today has LE edema, using O2 2L 24/7\par at baseline uses only at night (?)\par \par \par

## 2023-05-19 NOTE — DISCHARGE NOTE PROVIDER - NSDCCPCAREPLAN_GEN_ALL_CORE_FT
Anesthesia Post-op Note    Patient: Crissy Dorsey  Procedure(s) Performed: ESOPHAGOGASTRODUODENOSCOPYULTRASOUND, ENDOSCOPIC WITH POSSIBLE FNA  Anesthesia type: MAC    Vitals Value Taken Time   Temp 36.4 °C (97.6 °F) 05/19/23 0919   Pulse 54 05/19/23 0919   Resp 20 05/19/23 0919   SpO2 98 % 05/19/23 0919   /65 05/19/23 0919         Patient Location: Phase II  Post-op Vital Signs:stable  Level of Consciousness: participates in exam, answers questions appropriately, awake, alert and oriented  Respiratory Status: spontaneous ventilation  Cardiovascular blood pressure returned to baseline and stable  Hydration: euvolemic  Pain Management: adequately controlled  Nausea: None  Airway Patency:patent  Post-op Assessment: awake, alert, appropriately conversant, or baseline, no complications, patient tolerated procedure well with no complications and no evidence of recall      No notable events documented.  
PRINCIPAL DISCHARGE DIAGNOSIS  Diagnosis: Gram-negative pneumonia  Assessment and Plan of Treatment:       SECONDARY DISCHARGE DIAGNOSES  Diagnosis: COPD exacerbation  Assessment and Plan of Treatment:

## 2023-05-23 ENCOUNTER — RX RENEWAL (OUTPATIENT)
Age: 68
End: 2023-05-23

## 2023-05-24 ENCOUNTER — APPOINTMENT (OUTPATIENT)
Dept: CARDIOLOGY | Facility: CLINIC | Age: 68
End: 2023-05-24

## 2023-05-27 ENCOUNTER — INPATIENT (INPATIENT)
Facility: HOSPITAL | Age: 68
LOS: 12 days | Discharge: HOME HEALTH | DRG: 291 | End: 2023-06-09
Attending: HOSPITALIST | Admitting: INTERNAL MEDICINE
Payer: MEDICARE

## 2023-05-27 VITALS
SYSTOLIC BLOOD PRESSURE: 124 MMHG | DIASTOLIC BLOOD PRESSURE: 56 MMHG | TEMPERATURE: 99 F | HEART RATE: 110 BPM | OXYGEN SATURATION: 86 % | RESPIRATION RATE: 30 BRPM | WEIGHT: 274.92 LBS

## 2023-05-27 DIAGNOSIS — S88.119A COMPLETE TRAUMATIC AMPUTATION AT LEVEL BETWEEN KNEE AND ANKLE, UNSPECIFIED LOWER LEG, INITIAL ENCOUNTER: Chronic | ICD-10-CM

## 2023-05-27 DIAGNOSIS — Z95.818 PRESENCE OF OTHER CARDIAC IMPLANTS AND GRAFTS: Chronic | ICD-10-CM

## 2023-05-27 DIAGNOSIS — Z98.890 OTHER SPECIFIED POSTPROCEDURAL STATES: Chronic | ICD-10-CM

## 2023-05-27 DIAGNOSIS — Z89.512 ACQUIRED ABSENCE OF LEFT LEG BELOW KNEE: Chronic | ICD-10-CM

## 2023-05-27 DIAGNOSIS — Z93.1 GASTROSTOMY STATUS: Chronic | ICD-10-CM

## 2023-05-27 DIAGNOSIS — I50.9 HEART FAILURE, UNSPECIFIED: ICD-10-CM

## 2023-05-27 LAB
24R-OH-CALCIDIOL SERPL-MCNC: 17 NG/ML — LOW (ref 30–80)
ALBUMIN SERPL ELPH-MCNC: 2.9 G/DL — LOW (ref 3.3–5)
ALBUMIN SERPL ELPH-MCNC: 3 G/DL — LOW (ref 3.3–5)
ALP SERPL-CCNC: 95 U/L — SIGNIFICANT CHANGE UP (ref 40–120)
ALP SERPL-CCNC: 97 U/L — SIGNIFICANT CHANGE UP (ref 40–120)
ALT FLD-CCNC: 18 U/L — SIGNIFICANT CHANGE UP (ref 12–78)
ALT FLD-CCNC: 22 U/L — SIGNIFICANT CHANGE UP (ref 12–78)
AMMONIA BLD-MCNC: 49 UMOL/L — HIGH (ref 11–32)
ANION GAP SERPL CALC-SCNC: 4 MMOL/L — LOW (ref 5–17)
ANION GAP SERPL CALC-SCNC: 5 MMOL/L — SIGNIFICANT CHANGE UP (ref 5–17)
APPEARANCE UR: CLEAR — SIGNIFICANT CHANGE UP
APTT BLD: 29.8 SEC — SIGNIFICANT CHANGE UP (ref 27.5–35.5)
AST SERPL-CCNC: 15 U/L — SIGNIFICANT CHANGE UP (ref 15–37)
AST SERPL-CCNC: 21 U/L — SIGNIFICANT CHANGE UP (ref 15–37)
BACTERIA # UR AUTO: ABNORMAL
BASE EXCESS BLDV CALC-SCNC: 7.8 MMOL/L — HIGH (ref -2–3)
BASOPHILS # BLD AUTO: 0.01 K/UL — SIGNIFICANT CHANGE UP (ref 0–0.2)
BASOPHILS # BLD AUTO: 0.03 K/UL — SIGNIFICANT CHANGE UP (ref 0–0.2)
BASOPHILS NFR BLD AUTO: 0.2 % — SIGNIFICANT CHANGE UP (ref 0–2)
BASOPHILS NFR BLD AUTO: 0.4 % — SIGNIFICANT CHANGE UP (ref 0–2)
BILIRUB SERPL-MCNC: 0.4 MG/DL — SIGNIFICANT CHANGE UP (ref 0.2–1.2)
BILIRUB SERPL-MCNC: 0.5 MG/DL — SIGNIFICANT CHANGE UP (ref 0.2–1.2)
BILIRUB UR-MCNC: NEGATIVE — SIGNIFICANT CHANGE UP
BUN SERPL-MCNC: 10 MG/DL — SIGNIFICANT CHANGE UP (ref 7–23)
BUN SERPL-MCNC: 11 MG/DL — SIGNIFICANT CHANGE UP (ref 7–23)
CALCIUM SERPL-MCNC: 8.4 MG/DL — LOW (ref 8.5–10.1)
CALCIUM SERPL-MCNC: 8.4 MG/DL — LOW (ref 8.5–10.1)
CHLORIDE SERPL-SCNC: 96 MMOL/L — SIGNIFICANT CHANGE UP (ref 96–108)
CHLORIDE SERPL-SCNC: 97 MMOL/L — SIGNIFICANT CHANGE UP (ref 96–108)
CK SERPL-CCNC: 54 U/L — SIGNIFICANT CHANGE UP (ref 26–308)
CO2 SERPL-SCNC: 35 MMOL/L — HIGH (ref 22–31)
CO2 SERPL-SCNC: 37 MMOL/L — HIGH (ref 22–31)
COLOR SPEC: YELLOW — SIGNIFICANT CHANGE UP
CREAT SERPL-MCNC: 0.9 MG/DL — SIGNIFICANT CHANGE UP (ref 0.5–1.3)
CREAT SERPL-MCNC: 0.93 MG/DL — SIGNIFICANT CHANGE UP (ref 0.5–1.3)
CRP SERPL-MCNC: 40 MG/L — HIGH
DIFF PNL FLD: ABNORMAL
EGFR: 91 ML/MIN/1.73M2 — SIGNIFICANT CHANGE UP
EGFR: 94 ML/MIN/1.73M2 — SIGNIFICANT CHANGE UP
EOSINOPHIL # BLD AUTO: 0.07 K/UL — SIGNIFICANT CHANGE UP (ref 0–0.5)
EOSINOPHIL # BLD AUTO: 0.11 K/UL — SIGNIFICANT CHANGE UP (ref 0–0.5)
EOSINOPHIL NFR BLD AUTO: 1.3 % — SIGNIFICANT CHANGE UP (ref 0–6)
EOSINOPHIL NFR BLD AUTO: 1.6 % — SIGNIFICANT CHANGE UP (ref 0–6)
EPI CELLS # UR: SIGNIFICANT CHANGE UP
ETHANOL SERPL-MCNC: <10 MG/DL — SIGNIFICANT CHANGE UP (ref 0–10)
FERRITIN SERPL-MCNC: 148 NG/ML — SIGNIFICANT CHANGE UP (ref 30–400)
FOLATE SERPL-MCNC: 7.2 NG/ML — SIGNIFICANT CHANGE UP
GAS PNL BLDV: SIGNIFICANT CHANGE UP
GLUCOSE SERPL-MCNC: 109 MG/DL — HIGH (ref 70–99)
GLUCOSE SERPL-MCNC: 121 MG/DL — HIGH (ref 70–99)
GLUCOSE UR QL: NEGATIVE — SIGNIFICANT CHANGE UP
HCO3 BLDV-SCNC: 36 MMOL/L — HIGH (ref 22–29)
HCT VFR BLD CALC: 36.1 % — LOW (ref 39–50)
HCT VFR BLD CALC: 37.3 % — LOW (ref 39–50)
HGB BLD-MCNC: 11.7 G/DL — LOW (ref 13–17)
HGB BLD-MCNC: 12.2 G/DL — LOW (ref 13–17)
HPIV3 RNA SPEC QL NAA+PROBE: DETECTED
HYALINE CASTS # UR AUTO: ABNORMAL /LPF
IMM GRANULOCYTES NFR BLD AUTO: 0.5 % — SIGNIFICANT CHANGE UP (ref 0–0.9)
IMM GRANULOCYTES NFR BLD AUTO: 0.6 % — SIGNIFICANT CHANGE UP (ref 0–0.9)
INR BLD: 1.07 RATIO — SIGNIFICANT CHANGE UP (ref 0.88–1.16)
IRON SATN MFR SERPL: 17 % — SIGNIFICANT CHANGE UP (ref 16–55)
IRON SATN MFR SERPL: 56 UG/DL — SIGNIFICANT CHANGE UP (ref 45–165)
KETONES UR-MCNC: NEGATIVE — SIGNIFICANT CHANGE UP
LACTATE SERPL-SCNC: 1.4 MMOL/L — SIGNIFICANT CHANGE UP (ref 0.7–2)
LACTATE SERPL-SCNC: 2.2 MMOL/L — HIGH (ref 0.7–2)
LEUKOCYTE ESTERASE UR-ACNC: NEGATIVE — SIGNIFICANT CHANGE UP
LYMPHOCYTES # BLD AUTO: 0.6 K/UL — LOW (ref 1–3.3)
LYMPHOCYTES # BLD AUTO: 0.64 K/UL — LOW (ref 1–3.3)
LYMPHOCYTES # BLD AUTO: 10.9 % — LOW (ref 13–44)
LYMPHOCYTES # BLD AUTO: 9.5 % — LOW (ref 13–44)
MAGNESIUM SERPL-MCNC: 1.3 MG/DL — LOW (ref 1.6–2.6)
MAGNESIUM SERPL-MCNC: 1.4 MG/DL — LOW (ref 1.6–2.6)
MCHC RBC-ENTMCNC: 31.1 PG — SIGNIFICANT CHANGE UP (ref 27–34)
MCHC RBC-ENTMCNC: 31.4 PG — SIGNIFICANT CHANGE UP (ref 27–34)
MCHC RBC-ENTMCNC: 32.4 GM/DL — SIGNIFICANT CHANGE UP (ref 32–36)
MCHC RBC-ENTMCNC: 32.7 GM/DL — SIGNIFICANT CHANGE UP (ref 32–36)
MCV RBC AUTO: 95.9 FL — SIGNIFICANT CHANGE UP (ref 80–100)
MCV RBC AUTO: 96 FL — SIGNIFICANT CHANGE UP (ref 80–100)
MONOCYTES # BLD AUTO: 0.56 K/UL — SIGNIFICANT CHANGE UP (ref 0–0.9)
MONOCYTES # BLD AUTO: 0.57 K/UL — SIGNIFICANT CHANGE UP (ref 0–0.9)
MONOCYTES NFR BLD AUTO: 10.4 % — SIGNIFICANT CHANGE UP (ref 2–14)
MONOCYTES NFR BLD AUTO: 8.3 % — SIGNIFICANT CHANGE UP (ref 2–14)
NEUTROPHILS # BLD AUTO: 4.2 K/UL — SIGNIFICANT CHANGE UP (ref 1.8–7.4)
NEUTROPHILS # BLD AUTO: 5.38 K/UL — SIGNIFICANT CHANGE UP (ref 1.8–7.4)
NEUTROPHILS NFR BLD AUTO: 76.7 % — SIGNIFICANT CHANGE UP (ref 43–77)
NEUTROPHILS NFR BLD AUTO: 79.6 % — HIGH (ref 43–77)
NITRITE UR-MCNC: NEGATIVE — SIGNIFICANT CHANGE UP
NT-PROBNP SERPL-SCNC: 729 PG/ML — HIGH (ref 0–125)
NT-PROBNP SERPL-SCNC: 918 PG/ML — HIGH (ref 0–125)
PCO2 BLDV: 67 MMHG — HIGH (ref 42–55)
PH BLDV: 7.34 — SIGNIFICANT CHANGE UP (ref 7.32–7.43)
PH UR: 5 — SIGNIFICANT CHANGE UP (ref 5–8)
PHOSPHATE SERPL-MCNC: 3.9 MG/DL — SIGNIFICANT CHANGE UP (ref 2.5–4.5)
PLATELET # BLD AUTO: 119 K/UL — LOW (ref 150–400)
PLATELET # BLD AUTO: 129 K/UL — LOW (ref 150–400)
PO2 BLDV: 80 MMHG — HIGH (ref 25–45)
POTASSIUM SERPL-MCNC: 3.5 MMOL/L — SIGNIFICANT CHANGE UP (ref 3.5–5.3)
POTASSIUM SERPL-MCNC: 3.7 MMOL/L — SIGNIFICANT CHANGE UP (ref 3.5–5.3)
POTASSIUM SERPL-SCNC: 3.5 MMOL/L — SIGNIFICANT CHANGE UP (ref 3.5–5.3)
POTASSIUM SERPL-SCNC: 3.7 MMOL/L — SIGNIFICANT CHANGE UP (ref 3.5–5.3)
PROT SERPL-MCNC: 7.1 GM/DL — SIGNIFICANT CHANGE UP (ref 6–8.3)
PROT SERPL-MCNC: 7.2 GM/DL — SIGNIFICANT CHANGE UP (ref 6–8.3)
PROT UR-MCNC: NEGATIVE — SIGNIFICANT CHANGE UP
PROTHROM AB SERPL-ACNC: 12.4 SEC — SIGNIFICANT CHANGE UP (ref 10.5–13.4)
RAPID RVP RESULT: DETECTED
RBC # BLD: 3.76 M/UL — LOW (ref 4.2–5.8)
RBC # BLD: 3.89 M/UL — LOW (ref 4.2–5.8)
RBC # FLD: 14 % — SIGNIFICANT CHANGE UP (ref 10.3–14.5)
RBC # FLD: 14.1 % — SIGNIFICANT CHANGE UP (ref 10.3–14.5)
RBC CASTS # UR COMP ASSIST: ABNORMAL /HPF (ref 0–4)
SAO2 % BLDV: 96 % — HIGH (ref 67–88)
SARS-COV-2 RNA SPEC QL NAA+PROBE: SIGNIFICANT CHANGE UP
SODIUM SERPL-SCNC: 137 MMOL/L — SIGNIFICANT CHANGE UP (ref 135–145)
SODIUM SERPL-SCNC: 137 MMOL/L — SIGNIFICANT CHANGE UP (ref 135–145)
SP GR SPEC: 1.01 — SIGNIFICANT CHANGE UP (ref 1.01–1.02)
TIBC SERPL-MCNC: 331 UG/DL — SIGNIFICANT CHANGE UP (ref 220–430)
TROPONIN I, HIGH SENSITIVITY RESULT: 7.18 NG/L — SIGNIFICANT CHANGE UP
TROPONIN I, HIGH SENSITIVITY RESULT: 7.58 NG/L — SIGNIFICANT CHANGE UP
TSH SERPL-MCNC: 0.96 UU/ML — SIGNIFICANT CHANGE UP (ref 0.34–4.82)
UIBC SERPL-MCNC: 275 UG/DL — SIGNIFICANT CHANGE UP (ref 110–370)
UROBILINOGEN FLD QL: NEGATIVE — SIGNIFICANT CHANGE UP
VIT B12 SERPL-MCNC: 783 PG/ML — SIGNIFICANT CHANGE UP (ref 232–1245)
WBC # BLD: 5.48 K/UL — SIGNIFICANT CHANGE UP (ref 3.8–10.5)
WBC # BLD: 6.76 K/UL — SIGNIFICANT CHANGE UP (ref 3.8–10.5)
WBC # FLD AUTO: 5.48 K/UL — SIGNIFICANT CHANGE UP (ref 3.8–10.5)
WBC # FLD AUTO: 6.76 K/UL — SIGNIFICANT CHANGE UP (ref 3.8–10.5)
WBC UR QL: SIGNIFICANT CHANGE UP /HPF (ref 0–5)

## 2023-05-27 PROCEDURE — 82607 VITAMIN B-12: CPT

## 2023-05-27 PROCEDURE — 83880 ASSAY OF NATRIURETIC PEPTIDE: CPT

## 2023-05-27 PROCEDURE — 82746 ASSAY OF FOLIC ACID SERUM: CPT

## 2023-05-27 PROCEDURE — 97530 THERAPEUTIC ACTIVITIES: CPT | Mod: GP

## 2023-05-27 PROCEDURE — 74176 CT ABD & PELVIS W/O CONTRAST: CPT

## 2023-05-27 PROCEDURE — 84484 ASSAY OF TROPONIN QUANT: CPT

## 2023-05-27 PROCEDURE — 97162 PT EVAL MOD COMPLEX 30 MIN: CPT | Mod: GP

## 2023-05-27 PROCEDURE — 87635 SARS-COV-2 COVID-19 AMP PRB: CPT

## 2023-05-27 PROCEDURE — 97116 GAIT TRAINING THERAPY: CPT | Mod: GP

## 2023-05-27 PROCEDURE — 83036 HEMOGLOBIN GLYCOSYLATED A1C: CPT

## 2023-05-27 PROCEDURE — 99223 1ST HOSP IP/OBS HIGH 75: CPT

## 2023-05-27 PROCEDURE — 93970 EXTREMITY STUDY: CPT

## 2023-05-27 PROCEDURE — 94660 CPAP INITIATION&MGMT: CPT

## 2023-05-27 PROCEDURE — 80053 COMPREHEN METABOLIC PANEL: CPT

## 2023-05-27 PROCEDURE — 83550 IRON BINDING TEST: CPT

## 2023-05-27 PROCEDURE — 70450 CT HEAD/BRAIN W/O DYE: CPT

## 2023-05-27 PROCEDURE — 36600 WITHDRAWAL OF ARTERIAL BLOOD: CPT

## 2023-05-27 PROCEDURE — 36415 COLL VENOUS BLD VENIPUNCTURE: CPT

## 2023-05-27 PROCEDURE — 82962 GLUCOSE BLOOD TEST: CPT

## 2023-05-27 PROCEDURE — 87070 CULTURE OTHR SPECIMN AEROBIC: CPT

## 2023-05-27 PROCEDURE — 74176 CT ABD & PELVIS W/O CONTRAST: CPT | Mod: 26

## 2023-05-27 PROCEDURE — 80307 DRUG TEST PRSMV CHEM ANLYZR: CPT

## 2023-05-27 PROCEDURE — 82550 ASSAY OF CK (CPK): CPT

## 2023-05-27 PROCEDURE — 83605 ASSAY OF LACTIC ACID: CPT

## 2023-05-27 PROCEDURE — 73600 X-RAY EXAM OF ANKLE: CPT | Mod: RT

## 2023-05-27 PROCEDURE — 82306 VITAMIN D 25 HYDROXY: CPT

## 2023-05-27 PROCEDURE — 71250 CT THORAX DX C-: CPT | Mod: 26

## 2023-05-27 PROCEDURE — 83735 ASSAY OF MAGNESIUM: CPT

## 2023-05-27 PROCEDURE — 80061 LIPID PANEL: CPT

## 2023-05-27 PROCEDURE — 85025 COMPLETE CBC W/AUTO DIFF WBC: CPT

## 2023-05-27 PROCEDURE — 84100 ASSAY OF PHOSPHORUS: CPT

## 2023-05-27 PROCEDURE — 73600 X-RAY EXAM OF ANKLE: CPT | Mod: 26,RT

## 2023-05-27 PROCEDURE — 81001 URINALYSIS AUTO W/SCOPE: CPT

## 2023-05-27 PROCEDURE — 84443 ASSAY THYROID STIM HORMONE: CPT

## 2023-05-27 PROCEDURE — 83540 ASSAY OF IRON: CPT

## 2023-05-27 PROCEDURE — 93970 EXTREMITY STUDY: CPT | Mod: 26

## 2023-05-27 PROCEDURE — 94640 AIRWAY INHALATION TREATMENT: CPT

## 2023-05-27 PROCEDURE — 99497 ADVNCD CARE PLAN 30 MIN: CPT | Mod: 25

## 2023-05-27 PROCEDURE — 86140 C-REACTIVE PROTEIN: CPT

## 2023-05-27 PROCEDURE — 80048 BASIC METABOLIC PNL TOTAL CA: CPT

## 2023-05-27 PROCEDURE — 71045 X-RAY EXAM CHEST 1 VIEW: CPT | Mod: 26

## 2023-05-27 PROCEDURE — 93005 ELECTROCARDIOGRAM TRACING: CPT

## 2023-05-27 PROCEDURE — 82803 BLOOD GASES ANY COMBINATION: CPT

## 2023-05-27 PROCEDURE — 87086 URINE CULTURE/COLONY COUNT: CPT

## 2023-05-27 PROCEDURE — 80076 HEPATIC FUNCTION PANEL: CPT

## 2023-05-27 PROCEDURE — 71045 X-RAY EXAM CHEST 1 VIEW: CPT

## 2023-05-27 PROCEDURE — 71250 CT THORAX DX C-: CPT

## 2023-05-27 PROCEDURE — 82728 ASSAY OF FERRITIN: CPT

## 2023-05-27 PROCEDURE — 99285 EMERGENCY DEPT VISIT HI MDM: CPT

## 2023-05-27 PROCEDURE — 82140 ASSAY OF AMMONIA: CPT

## 2023-05-27 PROCEDURE — 85027 COMPLETE CBC AUTOMATED: CPT

## 2023-05-27 PROCEDURE — 93010 ELECTROCARDIOGRAM REPORT: CPT

## 2023-05-27 PROCEDURE — 84520 ASSAY OF UREA NITROGEN: CPT

## 2023-05-27 RX ORDER — ONDANSETRON 8 MG/1
4 TABLET, FILM COATED ORAL EVERY 8 HOURS
Refills: 0 | Status: DISCONTINUED | OUTPATIENT
Start: 2023-05-27 | End: 2023-06-09

## 2023-05-27 RX ORDER — LANOLIN ALCOHOL/MO/W.PET/CERES
3 CREAM (GRAM) TOPICAL AT BEDTIME
Refills: 0 | Status: DISCONTINUED | OUTPATIENT
Start: 2023-05-27 | End: 2023-05-28

## 2023-05-27 RX ORDER — FUROSEMIDE 40 MG
40 TABLET ORAL
Refills: 0 | Status: DISCONTINUED | OUTPATIENT
Start: 2023-05-27 | End: 2023-06-03

## 2023-05-27 RX ORDER — CLOPIDOGREL BISULFATE 75 MG/1
1 TABLET, FILM COATED ORAL
Qty: 0 | Refills: 0 | DISCHARGE

## 2023-05-27 RX ORDER — QUETIAPINE FUMARATE 200 MG/1
100 TABLET, FILM COATED ORAL AT BEDTIME
Refills: 0 | Status: DISCONTINUED | OUTPATIENT
Start: 2023-05-27 | End: 2023-06-09

## 2023-05-27 RX ORDER — IPRATROPIUM/ALBUTEROL SULFATE 18-103MCG
3 AEROSOL WITH ADAPTER (GRAM) INHALATION ONCE
Refills: 0 | Status: COMPLETED | OUTPATIENT
Start: 2023-05-27 | End: 2023-05-27

## 2023-05-27 RX ORDER — CHOLECALCIFEROL (VITAMIN D3) 125 MCG
2000 CAPSULE ORAL AT BEDTIME
Refills: 0 | Status: DISCONTINUED | OUTPATIENT
Start: 2023-05-28 | End: 2023-06-09

## 2023-05-27 RX ORDER — ASPIRIN/CALCIUM CARB/MAGNESIUM 324 MG
162 TABLET ORAL ONCE
Refills: 0 | Status: COMPLETED | OUTPATIENT
Start: 2023-05-27 | End: 2023-05-27

## 2023-05-27 RX ORDER — IPRATROPIUM/ALBUTEROL SULFATE 18-103MCG
3 AEROSOL WITH ADAPTER (GRAM) INHALATION EVERY 6 HOURS
Refills: 0 | Status: DISCONTINUED | OUTPATIENT
Start: 2023-05-27 | End: 2023-06-03

## 2023-05-27 RX ORDER — PREGABALIN 225 MG/1
1000 CAPSULE ORAL AT BEDTIME
Refills: 0 | Status: DISCONTINUED | OUTPATIENT
Start: 2023-05-27 | End: 2023-06-09

## 2023-05-27 RX ORDER — METOPROLOL TARTRATE 50 MG
25 TABLET ORAL
Refills: 0 | Status: DISCONTINUED | OUTPATIENT
Start: 2023-05-27 | End: 2023-06-09

## 2023-05-27 RX ORDER — ACETAMINOPHEN 500 MG
650 TABLET ORAL EVERY 6 HOURS
Refills: 0 | Status: DISCONTINUED | OUTPATIENT
Start: 2023-05-27 | End: 2023-06-09

## 2023-05-27 RX ORDER — SPIRONOLACTONE 25 MG/1
25 TABLET, FILM COATED ORAL DAILY
Refills: 0 | Status: DISCONTINUED | OUTPATIENT
Start: 2023-05-27 | End: 2023-06-09

## 2023-05-27 RX ORDER — ACETAMINOPHEN 500 MG
1000 TABLET ORAL ONCE
Refills: 0 | Status: COMPLETED | OUTPATIENT
Start: 2023-05-27 | End: 2023-05-27

## 2023-05-27 RX ORDER — LACTULOSE 10 G/15ML
10 SOLUTION ORAL ONCE
Refills: 0 | Status: COMPLETED | OUTPATIENT
Start: 2023-05-27 | End: 2023-05-27

## 2023-05-27 RX ORDER — THIAMINE MONONITRATE (VIT B1) 100 MG
100 TABLET ORAL AT BEDTIME
Refills: 0 | Status: DISCONTINUED | OUTPATIENT
Start: 2023-05-27 | End: 2023-06-09

## 2023-05-27 RX ORDER — ACETAMINOPHEN 500 MG
2 TABLET ORAL
Qty: 0 | Refills: 0 | DISCHARGE

## 2023-05-27 RX ORDER — BUDESONIDE AND FORMOTEROL FUMARATE DIHYDRATE 160; 4.5 UG/1; UG/1
2 AEROSOL RESPIRATORY (INHALATION)
Refills: 0 | Status: DISCONTINUED | OUTPATIENT
Start: 2023-05-27 | End: 2023-05-29

## 2023-05-27 RX ORDER — FUROSEMIDE 40 MG
80 TABLET ORAL ONCE
Refills: 0 | Status: COMPLETED | OUTPATIENT
Start: 2023-05-27 | End: 2023-05-27

## 2023-05-27 RX ORDER — MAGNESIUM SULFATE 500 MG/ML
2 VIAL (ML) INJECTION ONCE
Refills: 0 | Status: COMPLETED | OUTPATIENT
Start: 2023-05-27 | End: 2023-05-27

## 2023-05-27 RX ORDER — PANTOPRAZOLE SODIUM 20 MG/1
40 TABLET, DELAYED RELEASE ORAL
Refills: 0 | Status: DISCONTINUED | OUTPATIENT
Start: 2023-05-27 | End: 2023-06-09

## 2023-05-27 RX ORDER — MAGNESIUM OXIDE 400 MG ORAL TABLET 241.3 MG
400 TABLET ORAL
Refills: 0 | Status: COMPLETED | OUTPATIENT
Start: 2023-05-27 | End: 2023-05-30

## 2023-05-27 RX ORDER — ERGOCALCIFEROL 1.25 MG/1
50000 CAPSULE ORAL ONCE
Refills: 0 | Status: COMPLETED | OUTPATIENT
Start: 2023-05-27 | End: 2023-05-27

## 2023-05-27 RX ORDER — DOXAZOSIN MESYLATE 4 MG
1 TABLET ORAL AT BEDTIME
Refills: 0 | Status: DISCONTINUED | OUTPATIENT
Start: 2023-05-27 | End: 2023-06-09

## 2023-05-27 RX ORDER — DILTIAZEM HCL 120 MG
120 CAPSULE, EXT RELEASE 24 HR ORAL DAILY
Refills: 0 | Status: DISCONTINUED | OUTPATIENT
Start: 2023-05-27 | End: 2023-05-28

## 2023-05-27 RX ORDER — GABAPENTIN 400 MG/1
400 CAPSULE ORAL THREE TIMES A DAY
Refills: 0 | Status: DISCONTINUED | OUTPATIENT
Start: 2023-05-27 | End: 2023-06-09

## 2023-05-27 RX ADMIN — MAGNESIUM OXIDE 400 MG ORAL TABLET 400 MILLIGRAM(S): 241.3 TABLET ORAL at 17:48

## 2023-05-27 RX ADMIN — Medication 3 MILLILITER(S): at 13:37

## 2023-05-27 RX ADMIN — Medication 120 MILLIGRAM(S): at 11:03

## 2023-05-27 RX ADMIN — Medication 25 GRAM(S): at 11:04

## 2023-05-27 RX ADMIN — GABAPENTIN 400 MILLIGRAM(S): 400 CAPSULE ORAL at 13:26

## 2023-05-27 RX ADMIN — Medication 25 MILLIGRAM(S): at 22:29

## 2023-05-27 RX ADMIN — PANTOPRAZOLE SODIUM 40 MILLIGRAM(S): 20 TABLET, DELAYED RELEASE ORAL at 11:02

## 2023-05-27 RX ADMIN — QUETIAPINE FUMARATE 100 MILLIGRAM(S): 200 TABLET, FILM COATED ORAL at 22:29

## 2023-05-27 RX ADMIN — SPIRONOLACTONE 25 MILLIGRAM(S): 25 TABLET, FILM COATED ORAL at 11:02

## 2023-05-27 RX ADMIN — LACTULOSE 10 GRAM(S): 10 SOLUTION ORAL at 13:25

## 2023-05-27 RX ADMIN — Medication 3 MILLILITER(S): at 20:54

## 2023-05-27 RX ADMIN — PREGABALIN 1000 MICROGRAM(S): 225 CAPSULE ORAL at 22:30

## 2023-05-27 RX ADMIN — Medication 10 MILLIGRAM(S): at 11:26

## 2023-05-27 RX ADMIN — Medication 400 MILLIGRAM(S): at 03:09

## 2023-05-27 RX ADMIN — Medication 3 MILLILITER(S): at 01:40

## 2023-05-27 RX ADMIN — Medication 40 MILLIGRAM(S): at 13:28

## 2023-05-27 RX ADMIN — Medication 25 MILLIGRAM(S): at 11:03

## 2023-05-27 RX ADMIN — Medication 10 MILLIGRAM(S): at 22:30

## 2023-05-27 RX ADMIN — Medication 1 MILLIGRAM(S): at 22:30

## 2023-05-27 RX ADMIN — Medication 5 MILLIGRAM(S): at 22:32

## 2023-05-27 RX ADMIN — Medication 100 MILLIGRAM(S): at 22:30

## 2023-05-27 RX ADMIN — Medication 80 MILLIGRAM(S): at 01:39

## 2023-05-27 RX ADMIN — Medication 5 MILLIGRAM(S): at 11:03

## 2023-05-27 RX ADMIN — GABAPENTIN 400 MILLIGRAM(S): 400 CAPSULE ORAL at 22:30

## 2023-05-27 RX ADMIN — ERGOCALCIFEROL 50000 UNIT(S): 1.25 CAPSULE ORAL at 17:48

## 2023-05-27 RX ADMIN — Medication 400 MILLIGRAM(S): at 12:25

## 2023-05-27 NOTE — ED PROVIDER NOTE - OBJECTIVE STATEMENT
68-year-old male with history of COPD, CHF, hypertension, obesity, alcoholism presents with difficulty breathing.  Patient states that he has had worsening shortness of breath since yesterday and this evening he had much worsening of symptoms.  Brought in by EMS who stated he was very short of breath and oxygen was in the high 80s/low 90s upon arrival.  Improved with supplemental oxygen and albuterol nebulizer treatment.  Patient denies any cough or fever.

## 2023-05-27 NOTE — ED ADULT NURSE NOTE - OBJECTIVE STATEMENT
pt BIB EMS for difficulty breathing. pt resident at [facility] living. pt O2 sat at 92% on O2 when triaged. noted shortness of breath continued, received albuterol in route to ER. placed on BiPAP upon arrival. no further complaints at this time. denies chest pain or diaphoresis

## 2023-05-27 NOTE — H&P ADULT - ASSESSMENT
67 y/o male with PMH of HFpEF, permanent atrial fibrillation, Watchman Implant, MR , COPD on 4 L of home O2,  ETOH induced cirrhosis,  HTN, obesity, VIJAY (noncompliant w/ CPAP), alcoholism, pulmonary HTN,  Left BKA  presents to  with 1 day history of difficulty breathing, productive cough, wheezing.  Patient states that he has had worsening shortness of breath since yesterday and this evening he had much worsening of symptoms.  Brought in by EMS who stated he was very short of breath and oxygen was in the high 80s/low 90s upon arrival.  Improved with supplemental oxygen and albuterol nebulizer treatment. Patient also reports episode of rectal bleeding episode at home.     in ED - Vital sings  T  Max: 37.1, T(F) Max: 98.7 , HR: 106  (106 - 110), BP: 128/66 (124/56 - 128/66) RR: 16  (16 - 30) SpO2: 97%  (86% - 100%) EKG  A fib 112, Lactate 2.2--> 1.4, Mg 1.3, troponin 7.5 , , venous blood gas Ph 7.34 Parainfluenza positive, CXR - mild pulmonary edema, s/p , tylenol, duoneb, lasix 80 s/p BIPAP     Acute on chronic hypoxic respiratory failure , multifactorial  Acute bronchospasm due to Parainfluenza viral syndrome, r/o PNA  Acute on chronic diastolic HF , Leg edema r/o DVT   Underlying COPD on home O2 4 L  , pHTN  Normocytic anemia  - tele monitoring, pulse Oxymetry monitoring   - serial troponin x2 neg , EKG - afib   - s/p Lasix 80, c/w lasix 40 bid IV  - BIPAP as needed   - c/w duoneb, symbicort  - CT chest , doppler LE - pending   - 2 d echo - pending  - pulmonary and cardiology consult    Episode of rectal bleeding, h/o right ischemic colitis , ? cecal process   ETOH induced cirrhosis   - hold ASA, monitor Hb 12--> 11  - alcohol level wnl   - GI evaluation Dr. Barker     Permanent A fib s/p Watchman Implant, MR   - c/w CCB, BB, lower dose of doxazosin given HF     Advance directives  - Full code  - daughter Dorota [phone]      69 y/o male with PMH of HFpEF, permanent atrial fibrillation, Watchman Implant, MR , COPD on 4 L of home O2,  ETOH induced cirrhosis,  HTN, obesity, VIJAY (noncompliant w/ CPAP), alcoholism, pulmonary HTN,  Left BKA  presents to  with 1 day history of difficulty breathing, productive cough, wheezing.  Patient states that he has had worsening shortness of breath since yesterday and this evening he had much worsening of symptoms.  Brought in by EMS who stated he was very short of breath and oxygen was in the high 80s/low 90s upon arrival.  Improved with supplemental oxygen and albuterol nebulizer treatment. Patient also reports episode of rectal bleeding episode at home.     in ED - Vital sings  T  Max: 37.1, T(F) Max: 98.7 , HR: 106  (106 - 110), BP: 128/66 (124/56 - 128/66) RR: 16  (16 - 30) SpO2: 97%  (86% - 100%) EKG  A fib 112, Lactate 2.2--> 1.4, Mg 1.3, troponin 7.5 , , venous blood gas Ph 7.34 Parainfluenza positive, CXR - mild pulmonary edema, s/p , tylenol, duoneb, lasix 80 s/p BIPAP     Acute on chronic hypoxic respiratory failure , multifactorial  Acute bronchospasm due to Parainfluenza viral syndrome, r/o PNA  Acute on chronic diastolic HF , Leg edema r/o DVT   Underlying COPD on home O2 4 L  , pHTN  Pulmonary nodules in MICHELLE and RLL  Normocytic anemia  - tele monitoring, pulse Oxymetry monitoring   - serial troponin x2 neg , EKG - afib   - s/p Lasix 80, c/w lasix 40 bid IV  - BIPAP as needed   - c/w duoneb, symbicort  - CT chest - new 9 mm MICHELLE opacity maybe infectious, ILD, 5 mm RLL nodule slightly enlarged, heart enlargement, enlarged pulmonary artery   -  doppler LE - pending   - 2 d echo - pending  - pulmonary and cardiology consult    Episode of rectal bleeding, h/o right ischemic colitis , ? cecal process   ETOH induced cirrhosis , Splenic lesion  - hold ASA, monitor Hb 12--> 11  - CT abd - cirrhotic liver, mildly enlarged 3.1 cm splenic lesion , possible cyst   - alcohol level wnl   - GI evaluation Dr. Barker     Permanent A fib s/p Watchman Implant, MR   - c/w CCB, BB, lower dose of doxazosin given HF     Hypomagnesemia, Hypocalcemia  - replace, monitor, check vit D     Mildly elevated ammonia   - lactulose 10 x1 dose, repeat level in am    Advance directives  - Full code  - daughter Dorota [phone]     Dispo IV lasix, tele, cardiology, pulmonology and GI evaluation

## 2023-05-27 NOTE — ED PROVIDER NOTE - PHYSICAL EXAMINATION
***GEN - + difficulty breathing; A+O x3 ***HEAD - NC/AT ***EYES/NOSE - PERRL, EOMI, mucous membranes moist, no discharge ***THROAT: Oral cavity and pharynx normal. No inflammation, swelling, exudate, or lesions.  ***NECK: Neck supple  ***PULMONARY - + tachypneic, CTA b/l, symmetric breath sounds. ***CARDIAC -s1s2, + tachycardiac, no M,G,R  ***ABDOMEN - +BS, ND, NT, soft, no guarding, no rebound, no masses   ***BACK - no CVA tenderness, Normal  spine ***EXTREMITIES - s/p left bka, symmetric pulses, 2+ dp, capillary refill < 2 seconds, no clubbing, no cyanosis, no edema ***SKIN - no rash or bruising   ***NEUROLOGIC - alert, CN 2-12 intact

## 2023-05-27 NOTE — H&P ADULT - CONVERSATION DETAILS
In case of emergencies patient would like to call his daughter Lizbet [phone] , Patient also would like all aggressive measures being implemented including resuscitation and intubation.  Patient will remain Full code .   time spend 18 min

## 2023-05-27 NOTE — ED ADULT NURSE REASSESSMENT NOTE - NS ED NURSE REASSESS COMMENT FT1
Pt is resting comfortably in bed, VSS. Pt states he has 10 out 10 pain of the head. Pt requesting IV Tylenol, MD Olea made aware. Awaiting orders and pt bed.

## 2023-05-27 NOTE — H&P ADULT - NSHPPHYSICALEXAM_GEN_ALL_CORE
PHYSICAL EXAM:    Constitutional: NAD, awake and alert   HEENT: PERR, EOMI, Normal Hearing, MMM  Neck: Soft and supple, No LAD, No JVD  Respiratory: Breath sounds decreased  bilaterally, + bilateral wheezing,  + rales , no rhonchi  Cardiovascular: S1 and S2, irregular rate and rhythm, no Murmurs, gallops or rubs  Gastrointestinal: Bowel Sounds present, soft, nontender, + distended, no guarding, no rebound  Extremities: + 1 LE  peripheral edema, LLE BKA   Vascular: 2+ peripheral pulses  Neurological: A/O x 3, no focal deficits  Musculoskeletal: 5/5 strength b/l upper and lower extremities  Skin: No rashes  rectal : deferred, not indicated

## 2023-05-27 NOTE — ED ADULT TRIAGE NOTE - CHIEF COMPLAINT QUOTE
BIBA for difficulty breathing from [facility] assisted living, O2 92% on O2, shortness of breath continued, received albuterol in route to ER,

## 2023-05-27 NOTE — PATIENT PROFILE ADULT - FALL HARM RISK - HARM RISK INTERVENTIONS
Assistance with ambulation/Assistance OOB with selected safe patient handling equipment/Communicate Risk of Fall with Harm to all staff/Discuss with provider need for PT consult/Monitor gait and stability/Reinforce activity limits and safety measures with patient and family/Tailored Fall Risk Interventions/Visual Cue: Yellow wristband and red socks/Bed in lowest position, wheels locked, appropriate side rails in place/Call bell, personal items and telephone in reach/Instruct patient to call for assistance before getting out of bed or chair/Non-slip footwear when patient is out of bed/[place] to call system/Physically safe environment - no spills, clutter or unnecessary equipment/Purposeful Proactive Rounding/Room/bathroom lighting operational, light cord in reach

## 2023-05-27 NOTE — ED PROVIDER NOTE - CLINICAL SUMMARY MEDICAL DECISION MAKING FREE TEXT BOX
68-year-old male with acute exacerbation of shortness of breath.  Appears to be fluid overloaded. Will trial BiPAP and diuresis.  Did have some improvement with albuterol.  We will also obtain VBG and can give DuoNeb as patient does have a history of COPD however clinically appears fluid overloaded.  Labs noted as well as chest x-ray.  Patient improving on BiPAP after medications.  Will trial off BiPAP.  Signout given to Hospitalist Dr. Durán

## 2023-05-27 NOTE — PATIENT PROFILE ADULT - FUNCTIONAL ASSESSMENT - BASIC MOBILITY 3.
Patient Information     Patient Name MRN Yanci Hurt [id number] Female 1942      Nursing Note by Sunita Lawson at 2017  8:00 AM     Author:  Sunita Lawson Service:  (none) Author Type:  (none)     Filed:  2017  8:15 AM Encounter Date:  2017 Status:  Signed     :  Sunita Lawson            Patient presents to the clinic for medication management.    Sunita Lawson LPN        2017 8:05 AM           4 = No assist / stand by assistance

## 2023-05-27 NOTE — ED ADULT NURSE NOTE - NSFALLUNIVINTERV_ED_ALL_ED
CHF update received from patient.    11/5/20: 212 lbs  12/8/2020: 212 lbs    Review of CHF Symptoms is reported to be:  Denies new or worsening swelling to BLE.  Elevated extremities as she can. Has compression socks and started wearing them and reports they actually help.  No NVDC. No SOB at rest. BONILLA stable. No orthopnea or PND. No chest pain or pressure. No LH or dizziness. Occasional rare palpitations.   Bed/Stretcher in lowest position, wheels locked, appropriate side rails in place/Call bell, personal items and telephone in reach/Instruct patient to call for assistance before getting out of bed/chair/stretcher/Non-slip footwear applied when patient is off stretcher/[place] to call system/Physically safe environment - no spills, clutter or unnecessary equipment/Purposeful proactive rounding/Room/bathroom lighting operational, light cord in reach

## 2023-05-27 NOTE — PATIENT PROFILE ADULT - FUNCTIONAL ASSESSMENT - BASIC MOBILITY 6.
4-calculated by average/Not able to assess (calculate score using [facility] averaging method)

## 2023-05-27 NOTE — H&P ADULT - HISTORY OF PRESENT ILLNESS
67 y/o male with PMH of HFpEF, permanent atrial fibrillation, Watchman Implant, MR , COPD on 4 L of home O2,  ETOH induced cirrhosis,  HTN, obesity, VIJAY (noncompliant w/ CPAP), alcoholism, pulmonary HTN,  Left BKA  presents to  with 1 day history of difficulty breathing, productive cough, wheezing.  Patient states that he has had worsening shortness of breath since yesterday and this evening he had much worsening of symptoms.  Brought in by EMS who stated he was very short of breath and oxygen was in the high 80s/low 90s upon arrival.  Improved with supplemental oxygen and albuterol nebulizer treatment. Patient also reports episode of rectal bleeding episode at home.     in ED - Vital sings  T  Max: 37.1, T(F) Max: 98.7 , HR: 106  (106 - 110), BP: 128/66 (124/56 - 128/66) RR: 16  (16 - 30) SpO2: 97%  (86% - 100%) EKG  A fib 112, Lactate 2.2--> 1.4, Mg 1.3, troponin 7.5 , , venous blood gas Ph 7.34 Parainfluenza positive, CXR - mild pulmonary edema, s/p , tylenol, duoneb, lasix 80 s/p BIPAP

## 2023-05-28 DIAGNOSIS — B34.8 OTHER VIRAL INFECTIONS OF UNSPECIFIED SITE: ICD-10-CM

## 2023-05-28 DIAGNOSIS — G47.33 OBSTRUCTIVE SLEEP APNEA (ADULT) (PEDIATRIC): ICD-10-CM

## 2023-05-28 DIAGNOSIS — J98.01 ACUTE BRONCHOSPASM: ICD-10-CM

## 2023-05-28 DIAGNOSIS — I27.20 PULMONARY HYPERTENSION, UNSPECIFIED: ICD-10-CM

## 2023-05-28 DIAGNOSIS — I50.33 ACUTE ON CHRONIC DIASTOLIC (CONGESTIVE) HEART FAILURE: ICD-10-CM

## 2023-05-28 DIAGNOSIS — J96.21 ACUTE AND CHRONIC RESPIRATORY FAILURE WITH HYPOXIA: ICD-10-CM

## 2023-05-28 DIAGNOSIS — I48.20 CHRONIC ATRIAL FIBRILLATION, UNSPECIFIED: ICD-10-CM

## 2023-05-28 DIAGNOSIS — J84.9 INTERSTITIAL PULMONARY DISEASE, UNSPECIFIED: ICD-10-CM

## 2023-05-28 DIAGNOSIS — E66.2 MORBID (SEVERE) OBESITY WITH ALVEOLAR HYPOVENTILATION: ICD-10-CM

## 2023-05-28 LAB
A1C WITH ESTIMATED AVERAGE GLUCOSE RESULT: 6 % — HIGH (ref 4–5.6)
ALBUMIN SERPL ELPH-MCNC: 2.9 G/DL — LOW (ref 3.3–5)
ALP SERPL-CCNC: 92 U/L — SIGNIFICANT CHANGE UP (ref 40–120)
ALT FLD-CCNC: 19 U/L — SIGNIFICANT CHANGE UP (ref 12–78)
ANION GAP SERPL CALC-SCNC: 4 MMOL/L — LOW (ref 5–17)
AST SERPL-CCNC: 25 U/L — SIGNIFICANT CHANGE UP (ref 15–37)
BASE EXCESS BLDA CALC-SCNC: 7.4 MMOL/L — HIGH (ref -2–3)
BASE EXCESS BLDV CALC-SCNC: 9.6 MMOL/L — HIGH (ref -2–3)
BASOPHILS # BLD AUTO: 0.01 K/UL — SIGNIFICANT CHANGE UP (ref 0–0.2)
BASOPHILS NFR BLD AUTO: 0.2 % — SIGNIFICANT CHANGE UP (ref 0–2)
BILIRUB SERPL-MCNC: 0.5 MG/DL — SIGNIFICANT CHANGE UP (ref 0.2–1.2)
BLOOD GAS COMMENTS ARTERIAL: SIGNIFICANT CHANGE UP
BUN SERPL-MCNC: 12 MG/DL — SIGNIFICANT CHANGE UP (ref 7–23)
CALCIUM SERPL-MCNC: 8.7 MG/DL — SIGNIFICANT CHANGE UP (ref 8.5–10.1)
CHLORIDE SERPL-SCNC: 93 MMOL/L — LOW (ref 96–108)
CHOLEST SERPL-MCNC: 177 MG/DL — SIGNIFICANT CHANGE UP
CO2 SERPL-SCNC: 38 MMOL/L — HIGH (ref 22–31)
CREAT SERPL-MCNC: 0.8 MG/DL — SIGNIFICANT CHANGE UP (ref 0.5–1.3)
CULTURE RESULTS: SIGNIFICANT CHANGE UP
EGFR: 98 ML/MIN/1.73M2 — SIGNIFICANT CHANGE UP
EOSINOPHIL # BLD AUTO: 0.12 K/UL — SIGNIFICANT CHANGE UP (ref 0–0.5)
EOSINOPHIL NFR BLD AUTO: 2.2 % — SIGNIFICANT CHANGE UP (ref 0–6)
ESTIMATED AVERAGE GLUCOSE: 126 MG/DL — HIGH (ref 68–114)
GLUCOSE SERPL-MCNC: 100 MG/DL — HIGH (ref 70–99)
HCO3 BLDA-SCNC: 35 MMOL/L — HIGH (ref 21–28)
HCO3 BLDV-SCNC: 40 MMOL/L — HIGH (ref 22–29)
HCT VFR BLD CALC: 38.5 % — LOW (ref 39–50)
HDLC SERPL-MCNC: 37 MG/DL — LOW
HGB BLD-MCNC: 12.1 G/DL — LOW (ref 13–17)
IMM GRANULOCYTES NFR BLD AUTO: 0.4 % — SIGNIFICANT CHANGE UP (ref 0–0.9)
LIPID PNL WITH DIRECT LDL SERPL: 121 MG/DL — HIGH
LYMPHOCYTES # BLD AUTO: 0.54 K/UL — LOW (ref 1–3.3)
LYMPHOCYTES # BLD AUTO: 9.7 % — LOW (ref 13–44)
MAGNESIUM SERPL-MCNC: 2 MG/DL — SIGNIFICANT CHANGE UP (ref 1.6–2.6)
MCHC RBC-ENTMCNC: 30.5 PG — SIGNIFICANT CHANGE UP (ref 27–34)
MCHC RBC-ENTMCNC: 31.4 GM/DL — LOW (ref 32–36)
MCV RBC AUTO: 97 FL — SIGNIFICANT CHANGE UP (ref 80–100)
MONOCYTES # BLD AUTO: 0.52 K/UL — SIGNIFICANT CHANGE UP (ref 0–0.9)
MONOCYTES NFR BLD AUTO: 9.4 % — SIGNIFICANT CHANGE UP (ref 2–14)
NEUTROPHILS # BLD AUTO: 4.34 K/UL — SIGNIFICANT CHANGE UP (ref 1.8–7.4)
NEUTROPHILS NFR BLD AUTO: 78.1 % — HIGH (ref 43–77)
NON HDL CHOLESTEROL: 140 MG/DL — HIGH
NT-PROBNP SERPL-SCNC: 1134 PG/ML — HIGH (ref 0–125)
PCO2 BLDA: 64 MMHG — HIGH (ref 35–48)
PCO2 BLDV: 90 MMHG — HIGH (ref 42–55)
PH BLDA: 7.35 — SIGNIFICANT CHANGE UP (ref 7.35–7.45)
PH BLDV: 7.26 — LOW (ref 7.32–7.43)
PHOSPHATE SERPL-MCNC: 3.4 MG/DL — SIGNIFICANT CHANGE UP (ref 2.5–4.5)
PLATELET # BLD AUTO: 149 K/UL — LOW (ref 150–400)
PO2 BLDA: 49 MMHG — CRITICAL LOW (ref 83–108)
PO2 BLDV: 56 MMHG — HIGH (ref 25–45)
POTASSIUM SERPL-MCNC: 4.1 MMOL/L — SIGNIFICANT CHANGE UP (ref 3.5–5.3)
POTASSIUM SERPL-SCNC: 4.1 MMOL/L — SIGNIFICANT CHANGE UP (ref 3.5–5.3)
PROT SERPL-MCNC: 7.2 GM/DL — SIGNIFICANT CHANGE UP (ref 6–8.3)
RBC # BLD: 3.97 M/UL — LOW (ref 4.2–5.8)
RBC # FLD: 14.1 % — SIGNIFICANT CHANGE UP (ref 10.3–14.5)
SAO2 % BLDA: 82 % — LOW (ref 94–98)
SAO2 % BLDV: 84 % — SIGNIFICANT CHANGE UP (ref 67–88)
SODIUM SERPL-SCNC: 135 MMOL/L — SIGNIFICANT CHANGE UP (ref 135–145)
SPECIMEN SOURCE: SIGNIFICANT CHANGE UP
TRIGL SERPL-MCNC: 97 MG/DL — SIGNIFICANT CHANGE UP
TROPONIN I, HIGH SENSITIVITY RESULT: 8.42 NG/L — SIGNIFICANT CHANGE UP
WBC # BLD: 5.55 K/UL — SIGNIFICANT CHANGE UP (ref 3.8–10.5)
WBC # FLD AUTO: 5.55 K/UL — SIGNIFICANT CHANGE UP (ref 3.8–10.5)

## 2023-05-28 PROCEDURE — 70450 CT HEAD/BRAIN W/O DYE: CPT | Mod: 26

## 2023-05-28 PROCEDURE — 99223 1ST HOSP IP/OBS HIGH 75: CPT

## 2023-05-28 PROCEDURE — 99232 SBSQ HOSP IP/OBS MODERATE 35: CPT

## 2023-05-28 PROCEDURE — 93010 ELECTROCARDIOGRAM REPORT: CPT

## 2023-05-28 RX ORDER — LANOLIN ALCOHOL/MO/W.PET/CERES
5 CREAM (GRAM) TOPICAL AT BEDTIME
Refills: 0 | Status: DISCONTINUED | OUTPATIENT
Start: 2023-05-28 | End: 2023-06-09

## 2023-05-28 RX ORDER — DILTIAZEM HCL 120 MG
180 CAPSULE, EXT RELEASE 24 HR ORAL DAILY
Refills: 0 | Status: DISCONTINUED | OUTPATIENT
Start: 2023-05-29 | End: 2023-06-09

## 2023-05-28 RX ORDER — METOPROLOL TARTRATE 50 MG
5 TABLET ORAL EVERY 6 HOURS
Refills: 0 | Status: DISCONTINUED | OUTPATIENT
Start: 2023-05-28 | End: 2023-05-31

## 2023-05-28 RX ORDER — AZITHROMYCIN 500 MG/1
500 TABLET, FILM COATED ORAL ONCE
Refills: 0 | Status: COMPLETED | OUTPATIENT
Start: 2023-05-28 | End: 2023-05-28

## 2023-05-28 RX ORDER — AZITHROMYCIN 500 MG/1
TABLET, FILM COATED ORAL
Refills: 0 | Status: DISCONTINUED | OUTPATIENT
Start: 2023-05-28 | End: 2023-05-31

## 2023-05-28 RX ORDER — PHENYLEPHRINE-SHARK LIVER OIL-MINERAL OIL-PETROLATUM RECTAL OINTMENT
1 OINTMENT (GRAM) RECTAL
Refills: 0 | Status: DISCONTINUED | OUTPATIENT
Start: 2023-05-28 | End: 2023-06-09

## 2023-05-28 RX ORDER — AZITHROMYCIN 500 MG/1
500 TABLET, FILM COATED ORAL EVERY 24 HOURS
Refills: 0 | Status: DISCONTINUED | OUTPATIENT
Start: 2023-05-29 | End: 2023-05-31

## 2023-05-28 RX ORDER — ACETAMINOPHEN 500 MG
1000 TABLET ORAL ONCE
Refills: 0 | Status: COMPLETED | OUTPATIENT
Start: 2023-05-28 | End: 2023-05-28

## 2023-05-28 RX ORDER — DILTIAZEM HCL 120 MG
10 CAPSULE, EXT RELEASE 24 HR ORAL ONCE
Refills: 0 | Status: DISCONTINUED | OUTPATIENT
Start: 2023-05-28 | End: 2023-05-28

## 2023-05-28 RX ADMIN — Medication 40 MILLIGRAM(S): at 21:31

## 2023-05-28 RX ADMIN — GABAPENTIN 400 MILLIGRAM(S): 400 CAPSULE ORAL at 14:38

## 2023-05-28 RX ADMIN — SPIRONOLACTONE 25 MILLIGRAM(S): 25 TABLET, FILM COATED ORAL at 10:01

## 2023-05-28 RX ADMIN — PHENYLEPHRINE-SHARK LIVER OIL-MINERAL OIL-PETROLATUM RECTAL OINTMENT 1 APPLICATION(S): at 21:31

## 2023-05-28 RX ADMIN — Medication 400 MILLIGRAM(S): at 18:19

## 2023-05-28 RX ADMIN — Medication 40 MILLIGRAM(S): at 06:26

## 2023-05-28 RX ADMIN — Medication 100 MILLIGRAM(S): at 21:32

## 2023-05-28 RX ADMIN — PHENYLEPHRINE-SHARK LIVER OIL-MINERAL OIL-PETROLATUM RECTAL OINTMENT 1 APPLICATION(S): at 14:38

## 2023-05-28 RX ADMIN — Medication 25 MILLIGRAM(S): at 10:02

## 2023-05-28 RX ADMIN — BUDESONIDE AND FORMOTEROL FUMARATE DIHYDRATE 2 PUFF(S): 160; 4.5 AEROSOL RESPIRATORY (INHALATION) at 20:19

## 2023-05-28 RX ADMIN — BUDESONIDE AND FORMOTEROL FUMARATE DIHYDRATE 2 PUFF(S): 160; 4.5 AEROSOL RESPIRATORY (INHALATION) at 08:26

## 2023-05-28 RX ADMIN — Medication 5 MILLIGRAM(S): at 10:01

## 2023-05-28 RX ADMIN — PANTOPRAZOLE SODIUM 40 MILLIGRAM(S): 20 TABLET, DELAYED RELEASE ORAL at 06:26

## 2023-05-28 RX ADMIN — Medication 10 MILLIGRAM(S): at 21:32

## 2023-05-28 RX ADMIN — GABAPENTIN 400 MILLIGRAM(S): 400 CAPSULE ORAL at 21:33

## 2023-05-28 RX ADMIN — MAGNESIUM OXIDE 400 MG ORAL TABLET 400 MILLIGRAM(S): 241.3 TABLET ORAL at 18:19

## 2023-05-28 RX ADMIN — PREGABALIN 1000 MICROGRAM(S): 225 CAPSULE ORAL at 21:33

## 2023-05-28 RX ADMIN — Medication 40 MILLIGRAM(S): at 10:01

## 2023-05-28 RX ADMIN — Medication 40 MILLIGRAM(S): at 14:38

## 2023-05-28 RX ADMIN — QUETIAPINE FUMARATE 100 MILLIGRAM(S): 200 TABLET, FILM COATED ORAL at 21:32

## 2023-05-28 RX ADMIN — Medication 5 MILLIGRAM(S): at 21:33

## 2023-05-28 RX ADMIN — Medication 2000 UNIT(S): at 21:33

## 2023-05-28 RX ADMIN — Medication 3 MILLILITER(S): at 20:18

## 2023-05-28 RX ADMIN — Medication 1 MILLIGRAM(S): at 21:33

## 2023-05-28 RX ADMIN — Medication 25 MILLIGRAM(S): at 21:32

## 2023-05-28 RX ADMIN — Medication 3 MILLILITER(S): at 13:37

## 2023-05-28 RX ADMIN — Medication 120 MILLIGRAM(S): at 10:01

## 2023-05-28 RX ADMIN — AZITHROMYCIN 255 MILLIGRAM(S): 500 TABLET, FILM COATED ORAL at 14:37

## 2023-05-28 RX ADMIN — Medication 10 MILLIGRAM(S): at 10:01

## 2023-05-28 RX ADMIN — Medication 3 MILLILITER(S): at 08:26

## 2023-05-28 RX ADMIN — GABAPENTIN 400 MILLIGRAM(S): 400 CAPSULE ORAL at 06:27

## 2023-05-28 RX ADMIN — MAGNESIUM OXIDE 400 MG ORAL TABLET 400 MILLIGRAM(S): 241.3 TABLET ORAL at 10:01

## 2023-05-28 NOTE — CONSULT NOTE ADULT - PROBLEM SELECTOR RECOMMENDATION 9
previous TTE from 6/21 shows preserved LVEF, now with exacerbation, fluid overload in the setting of viral disease, elevated TWW=5150  Recommendation: check TTE, GDMT with BB/MRA, diuresis with lasix 40 ivp bid, daily weight, fluid restriction 1.5L/day, recheck proBNP after 48 hrs of diuresis

## 2023-05-28 NOTE — PROGRESS NOTE ADULT - SUBJECTIVE AND OBJECTIVE BOX
Subjective:  Chief complain :   Patient is a 68y old  Male who presents with a chief complaint of dyspnea, cough, congestion (28 May 2023 11:06)    HPI:  69 y/o male with PMH of HFpEF, permanent atrial fibrillation, Watchman Implant, MR , COPD on 4 L of home O2,  ETOH induced cirrhosis,  HTN, obesity, VIJAY (noncompliant w/ CPAP), alcoholism, pulmonary HTN,  Left BKA  presents to  with 1 day history of difficulty breathing, productive cough, wheezing.  Patient states that he has had worsening shortness of breath since yesterday and this evening he had much worsening of symptoms.  Brought in by EMS who stated he was very short of breath and oxygen was in the high 80s/low 90s upon arrival.  Improved with supplemental oxygen and albuterol nebulizer treatment. Patient also reports episode of rectal bleeding episode at home.     in ED - Vital sings  T  Max: 37.1, T(F) Max: 98.7 , HR: 106  (106 - 110), BP: 128/66 (124/56 - 128/66) RR: 16  (16 - 30) SpO2: 97%  (86% - 100%) EKG  A fib 112, Lactate 2.2--> 1.4, Mg 1.3, troponin 7.5 , , venous blood gas Ph 7.34 Parainfluenza positive, CXR - mild pulmonary edema, s/p , tylenol, duoneb, lasix 80 s/p BIPAP        (27 May 2023 09:54)         Patient seen and examined at bedside earlier today,     Review of system- Rest of the review of system are negative except mentioned in HPI     Vital sings reviewed for last 24 h  T(C): 36.9 (23 @ 09:00), Max: 36.9 (23 @ 09:00)  HR: 59 (23 @ 09:00) (59 - 120)  BP: 135/48 (23 @ 09:00) (97/67 - 152/70)  RR: 24 (23 @ 09:00) (16 - 24)  SpO2: 94% (23 @ 13:43) (91% - 100%)  Wt(kg): --  Daily     Daily   CAPILLARY BLOOD GLUCOSE          Physical exam:   General : NAD, appear to be of stated age , well groomed   NERVOUS SYSTEM:  Alert & Oriented X3, non- focal exam, Motor Strength 5/5 B/L upper and lower extremities; DTRs 2+ intact and symmetric  HEAD:  Atraumatic, Normocephalic  EYES: EOMI, PERRLA, conjunctiva and sclera clear  HEENT: Moist mucous membranes, Supple neck , No JVD  CHEST: Clear to auscultation bilaterally; No rales, no rhonchi, no wheezing  HEART: Regular rate and rhythm; No murmurs, no rubs or gallops  ABDOMEN: Soft, Non-tender, Non-distended; Bowel sounds present, no guarding , no peritoneal irritation   GENITOURINARY- Voiding, no suprapubic tenderness  EXTREMITIES:  2+ Peripheral Pulses, No clubbing, cyanosis,   edema  MUSCULOSKELETAL:- No muscle tenderness, Muscle tone normal, No joint tenderness, no Joint swelling,  Joint ROM –normal   SKIN-no rash, no lesion    Labs radiologic and other test : all reviewed and interpreted :                         12.1   5.55  )-----------( 149      ( 28 May 2023 06:51 )             38.5     -    135  |  93<L>  |  12  ----------------------------<  100<H>  4.1   |  38<H>  |  0.80    Ca    8.7      28 May 2023 06:51  Phos  3.4       Mg     2.0         TPro  7.2  /  Alb  2.9<L>  /  TBili  0.5  /  DBili  x   /  AST  25  /  ALT  19  /  AlkPhos  92  -    PT/INR - ( 27 May 2023 01:23 )   PT: 12.4 sec;   INR: 1.07 ratio         PTT - ( 27 May 2023 01:23 )  PTT:29.8 sec    CARDIAC MARKERS ( 27 May 2023 09:22 )  x     / x     / 54 U/L / x     / x          Urinalysis Basic - ( 27 May 2023 13:46 )    Color: Yellow / Appearance: Clear / S.015 / pH: x  Gluc: x / Ketone: Negative  / Bili: Negative / Urobili: Negative   Blood: x / Protein: Negative / Nitrite: Negative   Leuk Esterase: Negative / RBC: 11-25 /HPF / WBC 0-2 /HPF   Sq Epi: x / Non Sq Epi: x / Bacteria: Occasional      ABG - ( 28 May 2023 10:09 )  pH, Arterial: 7.35  pH, Blood: x     /  pCO2: 64    /  pO2: 49    / HCO3: 35    / Base Excess: 7.4   /  SaO2: 82                  RECENT CULTURES:      Cardiac testing : reviewed   EKG     Procedures :     Devices:     Current medications:  acetaminophen     Tablet .. 650 milliGRAM(s) Oral every 6 hours PRN  albuterol/ipratropium for Nebulization 3 milliLiter(s) Nebulizer every 6 hours  aluminum hydroxide/magnesium hydroxide/simethicone Suspension 30 milliLiter(s) Oral every 4 hours PRN  azithromycin  IVPB      bisacodyl 5 milliGRAM(s) Oral every 12 hours  budesonide 160 MICROgram(s)/formoterol 4.5 MICROgram(s) Inhaler 2 Puff(s) Inhalation two times a day  busPIRone 10 milliGRAM(s) Oral two times a day  cholecalciferol 2000 Unit(s) Oral at bedtime  cyanocobalamin 1000 MICROGram(s) Oral at bedtime  doxazosin 1 milliGRAM(s) Oral at bedtime  furosemide   Injectable 40 milliGRAM(s) IV Push two times a day  gabapentin 400 milliGRAM(s) Oral three times a day  hemorrhoidal Ointment 1 Application(s) Rectal two times a day  magnesium oxide 400 milliGRAM(s) Oral two times a day with meals  melatonin 5 milliGRAM(s) Oral at bedtime  methylPREDNISolone sodium succinate Injectable 40 milliGRAM(s) IV Push every 8 hours  metoprolol tartrate 25 milliGRAM(s) Oral two times a day  metoprolol tartrate Injectable 5 milliGRAM(s) IV Push every 6 hours PRN  ondansetron Injectable 4 milliGRAM(s) IV Push every 8 hours PRN  pantoprazole    Tablet 40 milliGRAM(s) Oral before breakfast  QUEtiapine 100 milliGRAM(s) Oral at bedtime  spironolactone 25 milliGRAM(s) Oral daily  thiamine 100 milliGRAM(s) Oral at bedtime             Subjective:  Chief complain :   Patient is a 68y old  Male who presents with a chief complaint of dyspnea, cough, congestion     HPI:  67 y/o male with PMH of HFpEF, permanent atrial fibrillation, Watchman Implant, MR , COPD on 4 L of home O2,  ETOH induced cirrhosis,  HTN, obesity, VIJAY (noncompliant w/ CPAP), alcoholism, pulmonary HTN,  Left BKA  presents to  with 1 day history of difficulty breathing, productive cough, wheezing.  Patient states that he has had worsening shortness of breath since yesterday and this evening he had much worsening of symptoms.  Brought in by EMS who stated he was very short of breath and oxygen was in the high 80s/low 90s upon arrival.  Improved with supplemental oxygen and albuterol nebulizer treatment. Patient also reports episode of rectal bleeding episode at home.     in ED - Vital sings  T  Max: 37.1, T(F) Max: 98.7 , HR: 106  (106 - 110), BP: 128/66 (124/56 - 128/66) RR: 16  (16 - 30) SpO2: 97%  (86% - 100%) EKG  A fib 112, Lactate 2.2--> 1.4, Mg 1.3, troponin 7.5 , , venous blood gas Ph 7.34 Parainfluenza positive, CXR - mild pulmonary edema, s/p , tylenol, duoneb, lasix 80 s/p BIPAP        -   Patient seen and examined at bedside earlier today, reports dyspnea, feeling not better slept without BIPAP, refusing bipap during days as well, tolerating po intake, rectal bleeding subsided , denies cp, + cough, denies abdominal pain, plan discussed     Review of system- Rest of the review of system are negative except mentioned in HPI     Vital sings reviewed for last 24 h  T(C): 36.9 (23 @ 09:00), Max: 36.9 (23 @ 09:00)  HR: 59 (23 @ 09:00) (59 - 120)  BP: 135/48 (23 @ 09:00) (97/67 - 152/70)  RR: 24 (23 @ 09:00) (16 - 24)  SpO2: 94% (05-28-23 @ 13:43) (91% - 100%)      Physical exam:   General : NAD, appear to be of stated age , well groomed   NERVOUS SYSTEM:  Alert & Oriented X3, non- focal exam, Motor Strength 5/5 B/L upper and lower extremities; DTRs 2+ intact and symmetric  HEAD:  Atraumatic, Normocephalic  EYES: EOMI, PERRLA, conjunctiva and sclera clear  HEENT: Moist mucous membranes, Supple neck , No JVD  CHEST: BS decreased bilaterally; No rales, +  rhonchi, +  wheezing  HEART: Regular rate and rhythm; No murmurs, no rubs or gallops  ABDOMEN: Soft, Non-tender, Non-distended; Bowel sounds present, no guarding , no peritoneal irritation   GENITOURINARY- Voiding, no suprapubic tenderness  EXTREMITIES:  2+ Peripheral Pulses, No clubbing, cyanosis,   edema  MUSCULOSKELETAL:- No muscle tenderness, Muscle tone normal, No joint tenderness, no Joint swelling,  Joint ROM –normal   SKIN-no rash, no lesion    Labs radiologic and other test : all reviewed and interpreted :                         12.1   5.55  )-----------( 149      ( 28 May 2023 06:51 )             38.5     05-    135  |  93<L>  |  12  ----------------------------<  100<H>  4.1   |  38<H>  |  0.80    Ca    8.7      28 May 2023 06:51  Phos  3.4       Mg     2.0         TPro  7.2  /  Alb  2.9<L>  /  TBili  0.5  /  DBili  x   /  AST  25  /  ALT  19  /  AlkPhos  92  05-28    PT/INR - ( 27 May 2023 01:23 )   PT: 12.4 sec;   INR: 1.07 ratio         PTT - ( 27 May 2023 01:23 )  PTT:29.8 sec    CARDIAC MARKERS ( 27 May 2023 09:22 )  x     / x     / 54 U/L / x     / x          Urinalysis Basic - ( 27 May 2023 13:46 )    Color: Yellow / Appearance: Clear / S.015 / pH: x  Gluc: x / Ketone: Negative  / Bili: Negative / Urobili: Negative   Blood: x / Protein: Negative / Nitrite: Negative   Leuk Esterase: Negative / RBC: 11-25 /HPF / WBC 0-2 /HPF   Sq Epi: x / Non Sq Epi: x / Bacteria: Occasional      ABG - ( 28 May 2023 10:09 )  pH, Arterial: 7.35  pH, Blood: x     /  pCO2: 64    /  pO2: 49    / HCO3: 35    / Base Excess: 7.4   /  SaO2: 82        < from: Xray Ankle 2 Views, Right (23 @ 12:41) >  There is no fracture, dislocation, soft tissue swelling or joint effusion.  Degenerative changes at the ankle joint. Small plantar and retrocalcaneal   spurs. Degenerative changes of the talonavicular joint.  No radiopaque foreign body.    IMPRESSION:  1.  No fracture.    < end of copied text >    < from: US Duplex Venous Lower Ext Complete, Bilateral (23 @ 12:01) >  IMPRESSION:  No evidence of deep venous thrombosis in either lower extremity.    < end of copied text >  < from: CT Abdomen and Pelvis No Cont (23 @ 10:43) >  CHEST:  1. New 9 mm left upper lobe nodular opacity and nonspecific dependent   groundglass may be infectious or inflammatory. Additional peripheral   reticular opacities likely reflect component of chronic interstitial lung   disease. Slightlyenlarged 5 mm right lower lobe pulmonary nodule since   , of unclear significance. Follow-up chest CT is recommended in 6   weeks to evaluate for stability versus resolution of these findings.  2. Cardiac enlargement, multivessel coronary artery calcification, and   Watchman device noted. Significantly enlarged main pulmonary artery   likely reflects pulmonary arterial hypertension.    ABDOMEN/PELVIS:  1. Cirrhotic liver with mild splenomegaly. No ascites. Mildly enlarged   3.1cm splenic hypodense low-attenuation lesion of unclear etiology,   possibly a cyst. Consider abdominal MRI for further evaluation of these   findings.  2. No bowel distention seen.    < end of copied text >          RECENT CULTURES:  Culture - Urine (23 @ 13:46)    Specimen Source: Clean Catch None   Culture Results:   <10,000 CFU/mL Normal Urogenital Faith    Culture - Blood (23 @ 01:23)    Specimen Source: .Blood Blood-Venous   Culture Results:   No growth to date.        Cardiac testing : reviewed   EKG < from: 12 Lead ECG (23 @ 01:21) >  Ventricular Rate 112 BPM    Atrial Rate 129 BPM    QRS Duration 82 ms    Q-T Interval 270 ms    QTC Calculation(Bazett) 368 ms    R Axis 76 degrees    T Axis 60 degrees    Diagnosis Line Atrial fibrillation with rapid ventricular response with premature ventricular or aberrantly conducted complexes  Septal infarct (cited on or before 17-AUG-2017)  Abnormal ECG  When compared with ECG of 2023 00:30,  Questionable change in initial forces of Anterior leads    < end of copied text >      Procedures :     Devices:     Current medications:  acetaminophen     Tablet .. 650 milliGRAM(s) Oral every 6 hours PRN  albuterol/ipratropium for Nebulization 3 milliLiter(s) Nebulizer every 6 hours  aluminum hydroxide/magnesium hydroxide/simethicone Suspension 30 milliLiter(s) Oral every 4 hours PRN  azithromycin  IVPB      bisacodyl 5 milliGRAM(s) Oral every 12 hours  budesonide 160 MICROgram(s)/formoterol 4.5 MICROgram(s) Inhaler 2 Puff(s) Inhalation two times a day  busPIRone 10 milliGRAM(s) Oral two times a day  cholecalciferol 2000 Unit(s) Oral at bedtime  cyanocobalamin 1000 MICROGram(s) Oral at bedtime  doxazosin 1 milliGRAM(s) Oral at bedtime  furosemide   Injectable 40 milliGRAM(s) IV Push two times a day  gabapentin 400 milliGRAM(s) Oral three times a day  hemorrhoidal Ointment 1 Application(s) Rectal two times a day  magnesium oxide 400 milliGRAM(s) Oral two times a day with meals  melatonin 5 milliGRAM(s) Oral at bedtime  methylPREDNISolone sodium succinate Injectable 40 milliGRAM(s) IV Push every 8 hours  metoprolol tartrate 25 milliGRAM(s) Oral two times a day  metoprolol tartrate Injectable 5 milliGRAM(s) IV Push every 6 hours PRN  ondansetron Injectable 4 milliGRAM(s) IV Push every 8 hours PRN  pantoprazole    Tablet 40 milliGRAM(s) Oral before breakfast  QUEtiapine 100 milliGRAM(s) Oral at bedtime  spironolactone 25 milliGRAM(s) Oral daily  thiamine 100 milliGRAM(s) Oral at bedtime

## 2023-05-28 NOTE — CONSULT NOTE ADULT - ASSESSMENT
Cirrhosis, well compensated, no ascites. Etiology of cirrhosis likely EtOH and fatty liver due to obesity.  Rectal bleeding, possibly hemorrhoidal.    Rec:  -trend CBC  -EtOH cessation  -reg diet   -should have interval colonoscopy once improved from respiratory illness, he is too high risk for sedation currently  -should have screening EGD for varices given cirrhosis  -eventually can have abdominal MRI for eval of splenic lesion.  -d/w RN
- patient now on 5L nasal canula  - abg stat  - cont duoneb/symbicort  - start iv steroids  - will need bipap  - in afib w RVR; not on AC

## 2023-05-28 NOTE — CONSULT NOTE ADULT - PROBLEM SELECTOR RECOMMENDATION 2
s/p Watchman, no AC due to GIB,   now with PAF up to 180 due to viral disease - parainfluenza and COPD exacerbation   Recommendation: increase cardizem CD to 180 mg po daily, cont. BB - can titrate up for rate control,  treat underlying cause - management as per Hospitalist s/p Watchman, no AC due to GIB,   now with PAF up to 180 due to viral disease - parainfluenza and COPD exacerbation   Recommendation: increase cardizem CD to 180 mg po daily, cont. BB - can titrate up for rate control,  lopressor 5 mg ivp q 6hrs prn for HR>130, treat underlying cause - management as per Hospitalist

## 2023-05-28 NOTE — PROGRESS NOTE ADULT - ASSESSMENT
67 y/o male with PMH of HFpEF, permanent atrial fibrillation, Watchman Implant, MR , COPD on 4 L of home O2,  ETOH induced cirrhosis,  HTN, obesity, VIJAY (noncompliant w/ CPAP), alcoholism, pulmonary HTN,  Left BKA  presents to  with 1 day history of difficulty breathing, productive cough, wheezing.  Patient states that he has had worsening shortness of breath since yesterday and this evening he had much worsening of symptoms.  Brought in by EMS who stated he was very short of breath and oxygen was in the high 80s/low 90s upon arrival.  Improved with supplemental oxygen and albuterol nebulizer treatment. Patient also reports episode of rectal bleeding episode at home.     in ED - Vital sings  T  Max: 37.1, T(F) Max: 98.7 , HR: 106  (106 - 110), BP: 128/66 (124/56 - 128/66) RR: 16  (16 - 30) SpO2: 97%  (86% - 100%) EKG  A fib 112, Lactate 2.2--> 1.4, Mg 1.3, troponin 7.5 , , venous blood gas Ph 7.34 Parainfluenza positive, CXR - mild pulmonary edema, s/p , tylenol, duoneb, lasix 80 s/p BIPAP     Acute on chronic hypoxic respiratory failure , multifactorial  Acute bronchospasm due to Parainfluenza viral syndrome, r/o PNA  Acute on chronic diastolic HF , Leg edema r/o DVT   Underlying COPD on home O2 4 L  , pHTN  Pulmonary nodules in MICHELLE and RLL  Normocytic anemia  - tele monitoring, pulse Oxymetry monitoring   - serial troponin x2 neg , EKG - afib   - s/p Lasix 80, c/w lasix 40 bid IV  - BIPAP as needed   - c/w duoneb, symbicort  - CT chest - new 9 mm MICHELLE opacity maybe infectious, ILD, 5 mm RLL nodule slightly enlarged, heart enlargement, enlarged pulmonary artery   -  doppler LE - pending   - 2 d echo - pending  - pulmonary and cardiology consult    Episode of rectal bleeding, h/o right ischemic colitis , ? cecal process   ETOH induced cirrhosis , Splenic lesion  - hold ASA, monitor Hb 12--> 11  - CT abd - cirrhotic liver, mildly enlarged 3.1 cm splenic lesion , possible cyst   - alcohol level wnl   - GI evaluation Dr. Barker     Permanent A fib s/p Watchman Implant, MR   - c/w CCB, BB, lower dose of doxazosin given HF     Hypomagnesemia, Hypocalcemia  - replace, monitor, check vit D     Mildly elevated ammonia   - lactulose 10 x1 dose, repeat level in am    Advance directives  - Full code  - daughter Dorota [phone]     Dispo IV lasix, tele, cardiology, pulmonology and GI evaluation     69 y/o male with PMH of HFpEF, permanent atrial fibrillation, Watchman Implant, MR , COPD on 4 L of home O2,  ETOH induced cirrhosis,  HTN, obesity, VIJAY (noncompliant w/ CPAP), alcoholism, pulmonary HTN,  Left BKA  presents to  with 1 day history of difficulty breathing, productive cough, wheezing.  Patient states that he has had worsening shortness of breath since yesterday and this evening he had much worsening of symptoms.  Brought in by EMS who stated he was very short of breath and oxygen was in the high 80s/low 90s upon arrival.  Improved with supplemental oxygen and albuterol nebulizer treatment. Patient also reports episode of rectal bleeding episode at home.     in ED - Vital sings  T  Max: 37.1, T(F) Max: 98.7 , HR: 106  (106 - 110), BP: 128/66 (124/56 - 128/66) RR: 16  (16 - 30) SpO2: 97%  (86% - 100%) EKG  A fib 112, Lactate 2.2--> 1.4, Mg 1.3, troponin 7.5 , , venous blood gas Ph 7.34 Parainfluenza positive, CXR - mild pulmonary edema, s/p , tylenol, duoneb, lasix 80 s/p BIPAP     Acute on chronic hypoxic respiratory failure , multifactorial  Acute bronchospasm due to Parainfluenza viral syndrome ruled out  PNA  Acute on chronic diastolic HF , Leg edema ruled out  DVT   Underlying COPD on home O2 4 L with exacerbation   , pHTN  Pulmonary nodules in MICHELLE and RLL  Normocytic anemia  - tele monitoring, pulse Oxymetry monitoring   - serial troponin x2 neg , EKG - afib   - s/p Lasix 80, c/w lasix 40 bid IV  - BIPAP as needed  and at night   - c/w duoneb, symbicort  - CT chest - new 9 mm MICHELLE opacity maybe infectious, ILD, 5 mm RLL nodule slightly enlarged, heart enlargement, enlarged pulmonary artery   -  doppler LE -  neg for DVT   - 2 d echo - pending  - pulmonary and cardiology consult  - 5/28 - started on IV steroids and IV azithromycin for COPD exacebration    Episode of rectal bleeding likely due to hemorrhoids   h/o right ischemic colitis , cecal process in the past   ETOH induced cirrhosis , Splenic lesion  - hold ASA, monitor Hb 12--> 11  - CT abd - cirrhotic liver, mildly enlarged 3.1 cm splenic lesion , possible cyst   - alcohol level wnl   - GI evaluation Dr. Barker - o/p EGD and colonoscopy once respiratory status improving   -  preparation H cream bid     Permanent A fib s/p Watchman Implant, MR   - c/w CCB, BB, lower dose of doxazosin given HF   - 5/28  tele - HR uncontrolled with HR to  150 th , increase Cardizem 120--> 180 , use lopressor prn IV     Hypomagnesemia, Hypocalcemia due to severe vit D deficiency   - replace, monitor  - replace  vit D     Mildly elevated ammonia   - lactulose 10 x1 dose, repeat level in am    Advance directives  - Full code  - daughter Dorota [phone]     Dispo IV lasix, tele, cardiology, pulmonology and GI evaluation

## 2023-05-28 NOTE — CONSULT NOTE ADULT - PROBLEM SELECTOR PROBLEM 1
Acute on chronic respiratory failure with hypoxia and hypercapnia
Acute on chronic diastolic congestive heart failure

## 2023-05-28 NOTE — CONSULT NOTE ADULT - CONSULT REQUESTED DATE/TIME
28-May-2023 11:07
Last Visit: 4-21-21 with JESSE Luciano  Next Appointment: none  Previous Refill Encounter(s): 5-27-21 #60 with 1 refill    Requested Prescriptions     Pending Prescriptions Disp Refills    atenoloL (TENORMIN) 50 mg tablet 180 Tablet 1     Sig: Take 2 Tablets by mouth daily.
27-May-2023 19:23
28-May-2023 09:18

## 2023-05-28 NOTE — CONSULT NOTE ADULT - SUBJECTIVE AND OBJECTIVE BOX
GI consult    HPI:  67 y/o male with PMH of HFpEF, permanent atrial fibrillation, Watchman Implant, MR , COPD on 4 L of home O2,  ETOH induced cirrhosis,  HTN, obesity, VIJAY (noncompliant w/ CPAP), alcoholism, pulmonary HTN,  Left BKA  presents to  with 1 day history of difficulty breathing, productive cough, wheezing.  Patient states that he has had worsening shortness of breath since yesterday and this evening he had much worsening of symptoms.  Brought in by EMS who stated he was very short of breath and oxygen was in the high 80s/low 90s upon arrival.  Improved with supplemental oxygen and albuterol nebulizer treatment. Patient also reports episode of rectal bleeding episode at home.     in ED - [facility]  T  Max: 37.1, T(F) Max: 98.7 , HR: 106  (106 - 110), BP: 128/66 (124/56 - 128/66) RR: 16  (16 - 30) SpO2: 97%  (86% - 100%) EKG  A fib 112, Lactate 2.2--> 1.4, Mg 1.3, troponin 7.5 , , venous blood gas Ph 7.34 Parainfluenza positive, CXR - mild pulmonary edema, s/p , tylenol, duoneb, lasix 80 s/p BIPAP     Called to eval painless rectal bleeding, 2 episodes, has h/o hemorrhoid ligation 3 m ago. Was passing hard stool and noticed red blood. Last colonoscopy 7 y ago.   He drinks EtOH and was noted to have cirrhosis on CT-new dx for him. Reports large weight gain over several months, and increased abd girth. CT shows large amount of visceral fat but no ascites.  Had EGD many y ago for ulcer.  Currently quite dyspneic and has increased work of breathing.     PAST MEDICAL & SURGICAL HISTORY:  Chronic atrial fibrillation      Alcohol abuse      Poor historian      CHF (congestive heart failure)      Cirrhosis      Emphysema of lung      Alcohol withdrawal      Collapsed lung      Meningitis      Falls      Anemia      Chronic obstructive pulmonary disease (COPD)      GERD (gastroesophageal reflux disease)      Hypertension      Presence of Watchman left atrial appendage closure device      Atrial fibrillation      COPD without exacerbation      Chronic CHF      S/P BKA (below knee amputation) unilateral, left      Presence of Watchman left atrial appendage closure device      Unilateral amputation of lower extremity below knee      H/O tracheostomy  now removed      S/P percutaneous endoscopic gastrostomy (PEG) tube placement  now removed          Home Medications:  albuterol 90 mcg/inh inhalation aerosol: 2 puff(s) inhaled every 4 hours, As Needed for wheezing (27 May 2023 08:53)  Aspirin Enteric Coated 81 mg oral delayed release tablet: 1 tab(s) orally once a day (27 May 2023 08:53)  budesonide-formoterol 160 mcg-4.5 mcg/inh inhalation aerosol: 2 puff(s) inhaled 2 times a day (27 May 2023 08:53)  busPIRone 10 mg oral tablet: 1 tab(s) orally 2 times a day (27 May 2023 08:53)  Colace 100 mg oral capsule: 2 cap(s) orally once a day (27 May 2023 08:53)  cyanocobalamin 1000 mcg oral tablet: 1 tab(s) orally once a day (27 May 2023 08:53)  dilTIAZem 120 mg/24 hours oral capsule, extended release: 1 cap(s) orally once a day (27 May 2023 08:53)  doxazosin 2 mg oral tablet: 1 tab(s) orally once a day (at bedtime) (27 May 2023 08:53)  gabapentin 400 mg oral capsule: 1 cap(s) orally 3 times a day (27 May 2023 08:53)  Metoprolol Tartrate 25 mg oral tablet: 1 tab(s) orally 2 times a day (27 May 2023 08:53)  omeprazole 40 mg oral delayed release capsule: 1 cap(s) orally once a day (27 May 2023 08:53)  psyllium oral capsule: 2 cap(s) orally once a day (27 May 2023 08:53)  QUEtiapine 50 mg oral tablet: 2 tab(s) orally once a day (at bedtime) (27 May 2023 08:53)  spironolactone 25 mg oral tablet: 1 tab(s) orally once a day (27 May 2023 08:53)  thiamine 100 mg oral tablet: 1 tab(s) orally once a day (27 May 2023 08:53)      MEDICATIONS  (STANDING):  albuterol/ipratropium for Nebulization 3 milliLiter(s) Nebulizer every 6 hours  bisacodyl 5 milliGRAM(s) Oral every 12 hours  budesonide 160 MICROgram(s)/formoterol 4.5 MICROgram(s) Inhaler 2 Puff(s) Inhalation two times a day  busPIRone 10 milliGRAM(s) Oral two times a day  cyanocobalamin 1000 MICROGram(s) Oral at bedtime  diltiazem    milliGRAM(s) Oral daily  doxazosin 1 milliGRAM(s) Oral at bedtime  furosemide   Injectable 40 milliGRAM(s) IV Push two times a day  gabapentin 400 milliGRAM(s) Oral three times a day  magnesium oxide 400 milliGRAM(s) Oral two times a day with meals  metoprolol tartrate 25 milliGRAM(s) Oral two times a day  pantoprazole    Tablet 40 milliGRAM(s) Oral before breakfast  QUEtiapine 100 milliGRAM(s) Oral at bedtime  spironolactone 25 milliGRAM(s) Oral daily  thiamine 100 milliGRAM(s) Oral at bedtime    MEDICATIONS  (PRN):  acetaminophen     Tablet .. 650 milliGRAM(s) Oral every 6 hours PRN Temp greater or equal to 38C (100.4F), Mild Pain (1 - 3)  aluminum hydroxide/magnesium hydroxide/simethicone Suspension 30 milliLiter(s) Oral every 4 hours PRN Dyspepsia  melatonin 3 milliGRAM(s) Oral at bedtime PRN Insomnia  ondansetron Injectable 4 milliGRAM(s) IV Push every 8 hours PRN Nausea and/or Vomiting      Allergies    Duricef (Rash)  Ceclor (Rash)    Intolerances        SOCIAL HISTORY: per HPI    FAMILY HISTORY:  No pertinent family history in first degree relatives        ROS  As above  Otherwise unremarkable, all systems reviewed    PE:  Vital Signs Last 24 Hrs  T(C): 36.7 (27 May 2023 18:48), Max: 37.1 (27 May 2023 01:18)  T(F): 98 (27 May 2023 18:48), Max: 98.7 (27 May 2023 01:18)  HR: 109 (27 May 2023 18:48) (89 - 110)  BP: 137/71 (27 May 2023 18:48) (119/69 - 137/71)  BP(mean): 71 (27 May 2023 17:51) (71 - 84)  RR: 17 (27 May 2023 18:48) (16 - 30)  SpO2: 95% (27 May 2023 18:48) (86% - 100%)    Parameters below as of 27 May 2023 18:48  Patient On (Oxygen Delivery Method): nasal cannula  O2 Flow (L/min): 5      Constitutional: mildly SOB, well-developed, A+Ox3  Anicteric   Respiratory: labored breathing, on O2  Cardiovascular: S1 and S2, RRR  Gastrointestinal: +BS, soft, non tender, protuberant/obese, increased vascular pattern  Extremities: warm, well perfused, trace edema, LLE BKA with prosthetic  Psychiatric: Normal mood, normal affect  Neuro: moves all extremities, grossly intact  Skin: No rashes or lesions    LABS:                        11.7   5.48  )-----------( 129      ( 27 May 2023 09:22 )             36.1     05-27    137  |  96  |  11  ----------------------------<  109<H>  3.5   |  37<H>  |  0.93    Ca    8.4<L>      27 May 2023 09:22  Phos  3.9     05-27  Mg     1.4     05-27    TPro  7.1  /  Alb  3.0<L>  /  TBili  0.5  /  DBili  x   /  AST  15  /  ALT  18  /  AlkPhos  95  05-27    PT/INR - ( 27 May 2023 01:23 )   PT: 12.4 sec;   INR: 1.07 ratio         PTT - ( 27 May 2023 01:23 )  PTT:29.8 sec  LIVER FUNCTIONS - ( 27 May 2023 09:22 )  Alb: 3.0 g/dL / Pro: 7.1 gm/dL / ALK PHOS: 95 U/L / ALT: 18 U/L / AST: 15 U/L / GGT: x             RADIOLOGY & ADDITIONAL STUDIES:    ACC: [id number] EXAM: CT ABDOMEN AND PELVIS ORDERED BY: MAN APARICIO    ACC: [id number] EXAM: CT CHEST ORDERED BY: MAN APARICIO    PROCEDURE DATE: 05/27/2023        INTERPRETATION: CLINICAL INFORMATION: Abdominal distention, cirrhosis. Rule out ascites. Dyspnea, cough    COMPARISON: Chest CT 4/19/2020. CT abdomen pelvis 4/22/2020.    CONTRAST/COMPLICATIONS:  IV Contrast: NONE  Oral Contrast: NONE  Complications: None reported at time of study completion    PROCEDURE:  CT of the Chest, Abdomen and Pelvis was performed.  Sagittal and coronal reformats were performed.    FINDINGS:    CHEST:  LUNGS AND LARGE AIRWAYS: Central airways are patent. Bilateral peripheral reticulation and scarring, which may reflect underlying chronic interstitial lung disease. New 9 mm left upper lobe nodular opacity, image 29 series 2, which could reflect focus of infection in the appropriate clinical setting. Follow to resolution. Additional nonspecific bilateral lower thorax groundglass may be accentuated by respiratory motion. Right lower lobe 5 mm pulmonary nodule, image 61 series 2 previously measured 3 mm on 4/19/2020.  PLEURA: No pleural effusion or pneumothorax.  VESSELS: Mid ascending thoracic aorta is aneurysmal, measuring 4 cm. Atheromatous changes of the thoracic aorta. Enlarged main pulmonary artery measuring 5 cm likely reflects pulmonary arterial hypertension.  HEART: Enlarged heart with multivessel coronary artery calcification and Watchman device. Trace pericardial fluid.  MEDIASTINUM AND ZAC: Small volume nodes. Esophagus is nondistended.  CHEST WALL AND LOWER NECK: Portion of the left lower chest wall is not imaged. No chest wall hematoma. Visualized thyroid is unremarkable. Bilateral gynecomastia.    ABDOMEN AND PELVIS:    Solid organ evaluation limited due to noncontrast technique.    LIVER: Cirrhotic liver. Partially recanalized paraumbilical vein. Coarse calcification of the liver.  BILE DUCTS: No distention  GALLBLADDER: Unremarkable CT appearance  SPLEEN: Enlarged, 14 cm craniocaudal dimension with several coarse calcifications. 3.1 cm hypodense low-attenuation lesion of the spleen, image 75 series 2, previously 1.8 cm on 4/19/2020, of unclear etiology, possibly a cyst.  PANCREAS: No acute peripancreatic inflammation  ADRENALS: Unremarkable  KIDNEYS/URETERS: No hydronephrosis or obstructing ureteral calculus. 6 mm nonobstructing right renal calculus.    BLADDER: Minimally distended.  REPRODUCTIVE ORGANS: Prostate size within normal limits. Several coarse calcifications in the gallbladder.    BOWEL: Stomach is underdistended. Visualized bowel is nondistended. Appendix is unremarkable. Mild stool burden of the colon limits evaluation of the colonic mucosa. Please note that considerable portion of the left hemiabdomen is not imaged on this study, including portion of the left-sided bowel loops.  PERITONEUM: No ascites  VESSELS: No abdominal aortic aneurysm. Aortoiliac atheromatous changes.  RETROPERITONEUM/LYMPH NODES: Small volume nodes.  ABDOMINAL WALL: Small fat-containing umbilical and inguinal hernias. Please note that considerable portion of the left hemiabdomen is not imaged on this study.  BONES: Multilevel degenerative changes of the bones there is scoliotic spinal curvature. Old healed rib fractures.    IMPRESSION:    Noncontrast study. Also, please note that considerable portion of the left hemiabdomen and portion of the left lower thorax are not imaged on this study.    CHEST:  1. New 9 mm left upper lobe nodular opacity and nonspecific dependent groundglass may be infectious or inflammatory. Additional peripheral reticular opacities likely reflect component of chronic interstitial lung disease. Slightly enlarged 5 mm right lower lobe pulmonary nodule since 2020, of unclear significance. Follow-up chest CT is recommended in 6 weeks to evaluate for stability versus resolution of these findings.  2. Cardiac enlargement, multivessel coronary artery calcification, and Watchman device noted. Significantly enlarged main pulmonary artery likely reflects pulmonary arterial hypertension.    ABDOMEN/PELVIS:  1. Cirrhotic liver with mild splenomegaly. No ascites. Mildly enlarged 3.1cm splenic hypodense low-attenuation lesion of unclear etiology, possibly a cyst. Consider abdominal MRI for further evaluation of these findings.  2. No bowel distention seen.
  CHIEF COMPLAINT: Patient is a 68y old  Male who presents with a chief complaint of dyspnea, cough, congestion (28 May 2023 09:18)      HPI:  67 y/o male with PMH of HFpEF, permanent atrial fibrillation, Watchman Implant, MR , COPD on 4 L of home O2,  ETOH induced cirrhosis,  HTN, obesity, VIJAY (noncompliant w/ CPAP), alcoholism, pulmonary HTN,  Left BKA  presents to  with 1 day history of difficulty breathing, productive cough, wheezing.  Patient states that he has had worsening shortness of breath since yesterday and this evening he had much worsening of symptoms.  Brought in by EMS who stated he was very short of breath and oxygen was in the high 80s/low 90s upon arrival.  Improved with supplemental oxygen and albuterol nebulizer treatment. Patient also reports episode of rectal bleeding episode at home.     in ED - [facility]  T  Max: 37.1, T(F) Max: 98.7 , HR: 106  (106 - 110), BP: 128/66 (124/56 - 128/66) RR: 16  (16 - 30) SpO2: 97%  (86% - 100%) EKG  A fib 112, Lactate 2.2--> 1.4, Mg 1.3, troponin 7.5 , , venous blood gas Ph 7.34 Parainfluenza positive, CXR - mild pulmonary edema, s/p , tylenol, duoneb, lasix 80 s/p BIPAP        (27 May 2023 09:54)      PAST MEDICAL / SURGICAL HISTORY:  PAST MEDICAL & SURGICAL HISTORY:  Chronic atrial fibrillation      Alcohol abuse      Poor historian      CHF (congestive heart failure)      Cirrhosis      Emphysema of lung      Alcohol withdrawal      Collapsed lung      Meningitis      Falls      Anemia      Chronic obstructive pulmonary disease (COPD)      GERD (gastroesophageal reflux disease)      Hypertension      Presence of Watchman left atrial appendage closure device      Atrial fibrillation      COPD without exacerbation      Chronic CHF      S/P BKA (below knee amputation) unilateral, left      Presence of Watchman left atrial appendage closure device      Unilateral amputation of lower extremity below knee      H/O tracheostomy  now removed      S/P percutaneous endoscopic gastrostomy (PEG) tube placement  now removed          SOCIAL HISTORY:   Alcohol: Denied  Smoking: Nonsmoker  Drug Use: Denied  Marital Status:     FAMILY HISTORY: FAMILY HISTORY:  No pertinent family history in first degree relatives      MEDICATIONS  (STANDING):  albuterol/ipratropium for Nebulization 3 milliLiter(s) Nebulizer every 6 hours  bisacodyl 5 milliGRAM(s) Oral every 12 hours  budesonide 160 MICROgram(s)/formoterol 4.5 MICROgram(s) Inhaler 2 Puff(s) Inhalation two times a day  busPIRone 10 milliGRAM(s) Oral two times a day  cholecalciferol 2000 Unit(s) Oral at bedtime  cyanocobalamin 1000 MICROGram(s) Oral at bedtime  diltiazem Injectable 10 milliGRAM(s) IV Push once  doxazosin 1 milliGRAM(s) Oral at bedtime  furosemide   Injectable 40 milliGRAM(s) IV Push two times a day  gabapentin 400 milliGRAM(s) Oral three times a day  hemorrhoidal Ointment 1 Application(s) Rectal two times a day  magnesium oxide 400 milliGRAM(s) Oral two times a day with meals  methylPREDNISolone sodium succinate Injectable 40 milliGRAM(s) IV Push every 8 hours  metoprolol tartrate 25 milliGRAM(s) Oral two times a day  pantoprazole    Tablet 40 milliGRAM(s) Oral before breakfast  QUEtiapine 100 milliGRAM(s) Oral at bedtime  spironolactone 25 milliGRAM(s) Oral daily  thiamine 100 milliGRAM(s) Oral at bedtime    MEDICATIONS  (PRN):  acetaminophen     Tablet .. 650 milliGRAM(s) Oral every 6 hours PRN Temp greater or equal to 38C (100.4F), Mild Pain (1 - 3)  aluminum hydroxide/magnesium hydroxide/simethicone Suspension 30 milliLiter(s) Oral every 4 hours PRN Dyspepsia  melatonin 3 milliGRAM(s) Oral at bedtime PRN Insomnia  ondansetron Injectable 4 milliGRAM(s) IV Push every 8 hours PRN Nausea and/or Vomiting      Allergies    Duricef (Rash)  Ceclor (Rash)    Intolerances    REVIEW OF SYSTEMS:  CONSTITUTIONAL: No weakness, fevers or chills  Eyes: No visual changes  NECK: No pain or stiffness  RESPIRATORY: + cough, + wheezing, + shortness of breath  CARDIOVASCULAR: No chest pain or palpitations  GASTROINTESTINAL: No abdominal pain. No nausea, vomiting, or hematemesis; No diarrhea or constipation. No melena or hematochezia.  GENITOURINARY: No dysuria, frequency or hematuria  NEUROLOGICAL: No numbness.  SKIN: No itching or rash  All other review of systems is negative unless indicated above    VITAL SIGNS:   Vital Signs Last 24 Hrs  T(C): 36.9 (28 May 2023 09:00), Max: 36.9 (28 May 2023 09:00)  T(F): 98.4 (28 May 2023 09:00), Max: 98.4 (28 May 2023 09:00)  HR: 59 (28 May 2023 09:00) (59 - 120)  BP: 135/48 (28 May 2023 09:00) (97/67 - 152/70)  BP(mean): 71 (28 May 2023 09:00) (71 - 84)  RR: 24 (28 May 2023 09:00) (16 - 24)  SpO2: 93% (28 May 2023 09:00) (91% - 100%)    Parameters below as of 28 May 2023 09:00  Patient On (Oxygen Delivery Method): nasal cannula  O2 Flow (L/min): 4    PHYSICAL EXAM    Constitutional: NAD, awake and alert   HEENT: PERR, EOMI, Normal Hearing, MMM  Neck: Soft and supple, No LAD, No JVD  Respiratory: Breath sounds decreased  bilaterally, + bilateral wheezing,  + rales , no rhonchi  Cardiovascular: S1 and S2, irregular rate and rhythm, no Murmurs, gallops or rubs  Gastrointestinal: Bowel Sounds present, soft, nontender, + distended, no guarding, no rebound  Extremities: + 1 LE  peripheral edema, LLE BKA   Vascular: 2+ peripheral pulses  Neurological: A/O x 3, no focal deficits  Musculoskeletal: 5/5 strength b/l upper and lower extremities  Skin: No rashes      LABS:                        12.1   5.55  )-----------( 149      ( 28 May 2023 06:51 )             38.5                         11.7   5.48  )-----------( 129      ( 27 May 2023 09:22 )             36.1                         12.2   6.76  )-----------( 119      ( 27 May 2023 01:23 )             37.3     28 May 2023 06:51    135    |  93     |  12     ----------------------------<  100    4.1     |  38     |  0.80   27 May 2023 09:22    137    |  96     |  11     ----------------------------<  109    3.5     |  37     |  0.93   27 May 2023 01:23    137    |  97     |  10     ----------------------------<  121    3.7     |  35     |  0.90     Ca    8.7        28 May 2023 06:51  Ca    8.4        27 May 2023 09:22  Ca    8.4        27 May 2023 01:23  Phos  3.4       28 May 2023 06:51  Phos  3.9       27 May 2023 09:22  Mg     2.0       28 May 2023 06:51  Mg     1.4       27 May 2023 09:22  Mg     1.3       27 May 2023 01:23    TPro  7.2    /  Alb  2.9    /  TBili  0.5    /  DBili  x      /  AST  25     /  ALT  19     /  AlkPhos  92     28 May 2023 06:51  TPro  7.1    /  Alb  3.0    /  TBili  0.5    /  DBili  x      /  AST  15     /  ALT  18     /  AlkPhos  95     27 May 2023 09:22  TPro  7.2    /  Alb  2.9    /  TBili  0.4    /  DBili  x      /  AST  21     /  ALT  22     /  AlkPhos  97     27 May 2023 01:23    PT/INR - ( 27 May 2023 01:23 )   PT: 12.4 sec;   INR: 1.07 ratio         PTT - ( 27 May 2023 01:23 )  PTT:29.8 sec  CARDIAC MARKERS ( 27 May 2023 09:22 )  x     / x     / 54 U/L / x     / x        05-27 @ 09:22  TSH: 0.96    Radiology/Echo: pending      ---------------------------------------------------------------------------------------------    < from: TTE Echo Complete w/o Contrast w/ Doppler (06.30.21 @ 14:49) >   Impression     Summary     Mild concentric left ventricular hypertrophy is present.   Estimated left ventricular ejection fraction is 60-65 %.   The left atrium is severely dilated.   The right atrium is not well seen.   The right ventricle is not well seen, appears grossly normal.   The aortic valve is trileafletwith thin pliable leaflets.   Fibrocalcific changes noted to the mitral valve leaflets with preserved   leaflet excursion.   Highly eccentric mitral regurgitation is noted.(? severity-study limited))   The tricuspid valve leaflets are thin and pliable; valve motion is normal.   Moderate (2+) tricuspid valve regurgitation is present.   Moderate pulmonary hypertension.   No evidence of pericardial effusion.     Signature     ----------------------------------------------------------------   Electronically signed by Chapito Sim MD(Interpreting   physician) on 06/30/2021 05:43 PM    < end of copied text >  
  HPI:  69 y/o male with PMH of HFpEF, permanent atrial fibrillation, Watchman Implant, MR , COPD on 4 L of home O2,  ETOH induced cirrhosis,  HTN, obesity, VIJAY (noncompliant w/ CPAP), alcoholism, pulmonary HTN,  Left BKA  presents to  with 1 day history of difficulty breathing, productive cough, wheezing.  Patient states that he has had worsening shortness of breath since yesterday and this evening he had much worsening of symptoms.  Brought in by EMS who stated he was very short of breath and oxygen was in the high 80s/low 90s upon arrival.  Improved with supplemental oxygen and albuterol nebulizer treatment. Patient also reports episode of rectal bleeding episode at home.     in ED - [facility]  T  Max: 37.1, T(F) Max: 98.7 , HR: 106  (106 - 110), BP: 128/66 (124/56 - 128/66) RR: 16  (16 - 30) SpO2: 97%  (86% - 100%) EKG  A fib 112, Lactate 2.2--> 1.4, Mg 1.3, troponin 7.5 , , venous blood gas Ph 7.34 Parainfluenza positive, CXR - mild pulmonary edema, s/p , tylenol, duoneb, lasix 80 s/p BIPAP   (27 May 2023 09:54)    :  History as above. Awake and alert. Dyspneic. Parainfluenza 3 positive. Cxr shows mild congestion. Ct scan reviewed. Not wearing CPAP at home. Says he is on 4-5 liters at home.      PAST MEDICAL & SURGICAL HISTORY:  Chronic atrial fibrillation      Alcohol abuse      Poor historian      CHF (congestive heart failure)      Cirrhosis      Emphysema of lung      Alcohol withdrawal      Collapsed lung      Meningitis      Falls      Anemia      Chronic obstructive pulmonary disease (COPD)      GERD (gastroesophageal reflux disease)      Hypertension      Presence of Watchman left atrial appendage closure device      Atrial fibrillation      COPD without exacerbation      Chronic CHF      S/P BKA (below knee amputation) unilateral, left      Presence of Watchman left atrial appendage closure device      Unilateral amputation of lower extremity below knee      H/O tracheostomy  now removed      S/P percutaneous endoscopic gastrostomy (PEG) tube placement  now removed          MEDICATIONS  (STANDING):  albuterol/ipratropium for Nebulization 3 milliLiter(s) Nebulizer every 6 hours  bisacodyl 5 milliGRAM(s) Oral every 12 hours  budesonide 160 MICROgram(s)/formoterol 4.5 MICROgram(s) Inhaler 2 Puff(s) Inhalation two times a day  busPIRone 10 milliGRAM(s) Oral two times a day  cholecalciferol 2000 Unit(s) Oral at bedtime  cyanocobalamin 1000 MICROGram(s) Oral at bedtime  diltiazem    milliGRAM(s) Oral daily  doxazosin 1 milliGRAM(s) Oral at bedtime  furosemide   Injectable 40 milliGRAM(s) IV Push two times a day  gabapentin 400 milliGRAM(s) Oral three times a day  magnesium oxide 400 milliGRAM(s) Oral two times a day with meals  metoprolol tartrate 25 milliGRAM(s) Oral two times a day  pantoprazole    Tablet 40 milliGRAM(s) Oral before breakfast  QUEtiapine 100 milliGRAM(s) Oral at bedtime  spironolactone 25 milliGRAM(s) Oral daily  thiamine 100 milliGRAM(s) Oral at bedtime    MEDICATIONS  (PRN):  acetaminophen     Tablet .. 650 milliGRAM(s) Oral every 6 hours PRN Temp greater or equal to 38C (100.4F), Mild Pain (1 - 3)  aluminum hydroxide/magnesium hydroxide/simethicone Suspension 30 milliLiter(s) Oral every 4 hours PRN Dyspepsia  melatonin 3 milliGRAM(s) Oral at bedtime PRN Insomnia  ondansetron Injectable 4 milliGRAM(s) IV Push every 8 hours PRN Nausea and/or Vomiting      Allergies    Duricef (Rash)  Ceclor (Rash)    Intolerances        SOCIAL HISTORY: Denies tobacco, etoh abuse or illicit drug use    FAMILY HISTORY:  No pertinent family history in first degree relatives        Vital Signs Last 24 Hrs  T(C): 36.4 (28 May 2023 05:55), Max: 36.8 (27 May 2023 17:51)  T(F): 97.5 (28 May 2023 05:55), Max: 98.2 (27 May 2023 17:51)  HR: 94 (28 May 2023 06:20) (89 - 120)  BP: 135/72 (28 May 2023 06:20) (97/67 - 152/70)  BP(mean): 75 (28 May 2023 05:55) (71 - 84)  RR: 20 (28 May 2023 05:55) (16 - 20)  SpO2: 97% (28 May 2023 05:55) (95% - 100%)    Parameters below as of 28 May 2023 05:55  Patient On (Oxygen Delivery Method): nasal cannula  O2 Flow (L/min): 5      REVIEW OF SYSTEMS:    CONSTITUTIONAL:  As per HPI.  SKIN: no rashes  HEENT:  Eyes:  No diplopia or blurred vision. ENT:  No earache, sore throat or runny nose.  CARDIOVASCULAR:  No pressure, squeezing, tightness, heaviness or aching about the chest, neck, axilla or epigastrium.  RESPIRATORY:  sob  GASTROINTESTINAL:  No nausea, vomiting or diarrhea.  GENITOURINARY:  No dysuria, frequency or urgency.  MUSCULOSKELETAL:  left bka  SKIN:  No change in skin, hair or nails.  NEUROLOGIC:  No paresthesias, fasciculations, seizures or weakness.  PSYCHIATRIC:  No disorder of thought or mood.  ENDOCRINE:  No heat or cold intolerance, polyuria or polydipsia.  HEMATOLOGICAL:  No easy bruising or bleedings:  .     PHYSICAL EXAMINATION:    GENERAL APPEARANCE:  dyspneic, tachypneic  HEENT:  Pupils are normal and react normally. No icterus. Mucous membranes well colored.  NECK:  Supple. No lymphadenopathy. Jugular venous pressure not elevated. Carotids equal.   HEART:   S1S2 irreg, tachycardic  CHEST:  bilat exp ronchi  ABDOMEN:  Soft and nontender.   EXTREMITIES:  left BKA   SKIN:  No rash or significant lesions are noted.  CNS: AAO x 3    LABS:                        12.1   5.55  )-----------( 149      ( 28 May 2023 06:51 )             38.5         135  |  93<L>  |  12  ----------------------------<  100<H>  4.1   |  38<H>  |  0.80    Ca    8.7      28 May 2023 06:51  Phos  3.4       Mg     2.0         TPro  7.2  /  Alb  2.9<L>  /  TBili  0.5  /  DBili  x   /  AST  25  /  ALT  19  /  AlkPhos  92      LIVER FUNCTIONS - ( 28 May 2023 06:51 )  Alb: 2.9 g/dL / Pro: 7.2 gm/dL / ALK PHOS: 92 U/L / ALT: 19 U/L / AST: 25 U/L / GGT: x           PT/INR - ( 27 May 2023 01:23 )   PT: 12.4 sec;   INR: 1.07 ratio         PTT - ( 27 May 2023 01:23 )  PTT:29.8 sec  CARDIAC MARKERS ( 27 May 2023 09:22 )  x     / x     / 54 U/L / x     / x          Urinalysis Basic - ( 27 May 2023 13:46 )    Color: Yellow / Appearance: Clear / S.015 / pH: x  Gluc: x / Ketone: Negative  / Bili: Negative / Urobili: Negative   Blood: x / Protein: Negative / Nitrite: Negative   Leuk Esterase: Negative / RBC: 11-25 /HPF / WBC 0-2 /HPF   Sq Epi: x / Non Sq Epi: x / Bacteria: Occasional          RADIOLOGY & ADDITIONAL STUDIES:

## 2023-05-29 LAB
AMMONIA BLD-MCNC: 44 UMOL/L — HIGH (ref 11–32)
BASE EXCESS BLDA CALC-SCNC: 10.2 MMOL/L — HIGH (ref -2–3)
BASOPHILS # BLD AUTO: 0.01 K/UL — SIGNIFICANT CHANGE UP (ref 0–0.2)
BASOPHILS NFR BLD AUTO: 0.2 % — SIGNIFICANT CHANGE UP (ref 0–2)
BLOOD GAS COMMENTS ARTERIAL: SIGNIFICANT CHANGE UP
BUN SERPL-MCNC: 18 MG/DL — SIGNIFICANT CHANGE UP (ref 7–23)
CK SERPL-CCNC: 65 U/L — SIGNIFICANT CHANGE UP (ref 26–308)
CRP SERPL-MCNC: 46 MG/L — HIGH
EOSINOPHIL # BLD AUTO: 0 K/UL — SIGNIFICANT CHANGE UP (ref 0–0.5)
EOSINOPHIL NFR BLD AUTO: 0 % — SIGNIFICANT CHANGE UP (ref 0–6)
GAS PNL BLDA: SIGNIFICANT CHANGE UP
HCO3 BLDA-SCNC: 40 MMOL/L — HIGH (ref 21–28)
HCT VFR BLD CALC: 36.3 % — LOW (ref 39–50)
HGB BLD-MCNC: 11.6 G/DL — LOW (ref 13–17)
IMM GRANULOCYTES NFR BLD AUTO: 0.7 % — SIGNIFICANT CHANGE UP (ref 0–0.9)
LYMPHOCYTES # BLD AUTO: 0.5 K/UL — LOW (ref 1–3.3)
LYMPHOCYTES # BLD AUTO: 8.8 % — LOW (ref 13–44)
MAGNESIUM SERPL-MCNC: 2.1 MG/DL — SIGNIFICANT CHANGE UP (ref 1.6–2.6)
MCHC RBC-ENTMCNC: 30.6 PG — SIGNIFICANT CHANGE UP (ref 27–34)
MCHC RBC-ENTMCNC: 32 GM/DL — SIGNIFICANT CHANGE UP (ref 32–36)
MCV RBC AUTO: 95.8 FL — SIGNIFICANT CHANGE UP (ref 80–100)
MONOCYTES # BLD AUTO: 0.12 K/UL — SIGNIFICANT CHANGE UP (ref 0–0.9)
MONOCYTES NFR BLD AUTO: 2.1 % — SIGNIFICANT CHANGE UP (ref 2–14)
NEUTROPHILS # BLD AUTO: 4.99 K/UL — SIGNIFICANT CHANGE UP (ref 1.8–7.4)
NEUTROPHILS NFR BLD AUTO: 88.2 % — HIGH (ref 43–77)
PCO2 BLDA: 79 MMHG — CRITICAL HIGH (ref 35–48)
PH BLDA: 7.31 — LOW (ref 7.35–7.45)
PHOSPHATE SERPL-MCNC: 3.3 MG/DL — SIGNIFICANT CHANGE UP (ref 2.5–4.5)
PLATELET # BLD AUTO: 138 K/UL — LOW (ref 150–400)
PO2 BLDA: 101 MMHG — SIGNIFICANT CHANGE UP (ref 83–108)
RBC # BLD: 3.79 M/UL — LOW (ref 4.2–5.8)
RBC # FLD: 13.7 % — SIGNIFICANT CHANGE UP (ref 10.3–14.5)
SAO2 % BLDA: 99 % — HIGH (ref 94–98)
WBC # BLD: 5.66 K/UL — SIGNIFICANT CHANGE UP (ref 3.8–10.5)
WBC # FLD AUTO: 5.66 K/UL — SIGNIFICANT CHANGE UP (ref 3.8–10.5)

## 2023-05-29 PROCEDURE — 99233 SBSQ HOSP IP/OBS HIGH 50: CPT

## 2023-05-29 RX ORDER — BUDESONIDE, MICRONIZED 100 %
0.5 POWDER (GRAM) MISCELLANEOUS EVERY 12 HOURS
Refills: 0 | Status: DISCONTINUED | OUTPATIENT
Start: 2023-05-29 | End: 2023-06-03

## 2023-05-29 RX ORDER — ENOXAPARIN SODIUM 100 MG/ML
40 INJECTION SUBCUTANEOUS EVERY 24 HOURS
Refills: 0 | Status: DISCONTINUED | OUTPATIENT
Start: 2023-05-29 | End: 2023-06-09

## 2023-05-29 RX ADMIN — MAGNESIUM OXIDE 400 MG ORAL TABLET 400 MILLIGRAM(S): 241.3 TABLET ORAL at 18:00

## 2023-05-29 RX ADMIN — Medication 40 MILLIGRAM(S): at 05:48

## 2023-05-29 RX ADMIN — Medication 40 MILLIGRAM(S): at 15:00

## 2023-05-29 RX ADMIN — GABAPENTIN 400 MILLIGRAM(S): 400 CAPSULE ORAL at 05:48

## 2023-05-29 RX ADMIN — Medication 650 MILLIGRAM(S): at 01:58

## 2023-05-29 RX ADMIN — ENOXAPARIN SODIUM 40 MILLIGRAM(S): 100 INJECTION SUBCUTANEOUS at 21:52

## 2023-05-29 RX ADMIN — Medication 5 MILLIGRAM(S): at 21:49

## 2023-05-29 RX ADMIN — Medication 650 MILLIGRAM(S): at 01:27

## 2023-05-29 RX ADMIN — PREGABALIN 1000 MICROGRAM(S): 225 CAPSULE ORAL at 21:49

## 2023-05-29 RX ADMIN — MAGNESIUM OXIDE 400 MG ORAL TABLET 400 MILLIGRAM(S): 241.3 TABLET ORAL at 10:04

## 2023-05-29 RX ADMIN — GABAPENTIN 400 MILLIGRAM(S): 400 CAPSULE ORAL at 21:51

## 2023-05-29 RX ADMIN — Medication 40 MILLIGRAM(S): at 21:47

## 2023-05-29 RX ADMIN — Medication 25 MILLIGRAM(S): at 10:06

## 2023-05-29 RX ADMIN — PHENYLEPHRINE-SHARK LIVER OIL-MINERAL OIL-PETROLATUM RECTAL OINTMENT 1 APPLICATION(S): at 17:54

## 2023-05-29 RX ADMIN — AZITHROMYCIN 255 MILLIGRAM(S): 500 TABLET, FILM COATED ORAL at 12:22

## 2023-05-29 RX ADMIN — GABAPENTIN 400 MILLIGRAM(S): 400 CAPSULE ORAL at 17:57

## 2023-05-29 RX ADMIN — Medication 180 MILLIGRAM(S): at 10:06

## 2023-05-29 RX ADMIN — SPIRONOLACTONE 25 MILLIGRAM(S): 25 TABLET, FILM COATED ORAL at 10:06

## 2023-05-29 RX ADMIN — Medication 650 MILLIGRAM(S): at 10:04

## 2023-05-29 RX ADMIN — Medication 3 MILLILITER(S): at 21:02

## 2023-05-29 RX ADMIN — BUDESONIDE AND FORMOTEROL FUMARATE DIHYDRATE 2 PUFF(S): 160; 4.5 AEROSOL RESPIRATORY (INHALATION) at 09:05

## 2023-05-29 RX ADMIN — Medication 0.5 MILLIGRAM(S): at 21:01

## 2023-05-29 RX ADMIN — PANTOPRAZOLE SODIUM 40 MILLIGRAM(S): 20 TABLET, DELAYED RELEASE ORAL at 05:48

## 2023-05-29 RX ADMIN — Medication 650 MILLIGRAM(S): at 12:20

## 2023-05-29 RX ADMIN — Medication 10 MILLIGRAM(S): at 21:51

## 2023-05-29 RX ADMIN — Medication 2000 UNIT(S): at 21:50

## 2023-05-29 RX ADMIN — Medication 40 MILLIGRAM(S): at 05:49

## 2023-05-29 RX ADMIN — Medication 3 MILLILITER(S): at 09:04

## 2023-05-29 RX ADMIN — Medication 5 MILLIGRAM(S): at 10:05

## 2023-05-29 RX ADMIN — Medication 10 MILLIGRAM(S): at 10:05

## 2023-05-29 RX ADMIN — Medication 1 MILLIGRAM(S): at 21:49

## 2023-05-29 RX ADMIN — Medication 25 MILLIGRAM(S): at 21:50

## 2023-05-29 RX ADMIN — QUETIAPINE FUMARATE 100 MILLIGRAM(S): 200 TABLET, FILM COATED ORAL at 21:51

## 2023-05-29 RX ADMIN — Medication 3 MILLILITER(S): at 14:33

## 2023-05-29 RX ADMIN — PHENYLEPHRINE-SHARK LIVER OIL-MINERAL OIL-PETROLATUM RECTAL OINTMENT 1 APPLICATION(S): at 05:48

## 2023-05-29 RX ADMIN — Medication 100 MILLIGRAM(S): at 21:51

## 2023-05-29 RX ADMIN — Medication 3 MILLILITER(S): at 01:46

## 2023-05-29 NOTE — PROGRESS NOTE ADULT - SUBJECTIVE AND OBJECTIVE BOX
Subjective:    Awake, comfortable at rest. Scant white sputum.    MEDICATIONS  (STANDING):  albuterol/ipratropium for Nebulization 3 milliLiter(s) Nebulizer every 6 hours  azithromycin  IVPB      azithromycin  IVPB 500 milliGRAM(s) IV Intermittent every 24 hours  bisacodyl 5 milliGRAM(s) Oral every 12 hours  buDESOnide    Inhalation Suspension 0.5 milliGRAM(s) Inhalation every 12 hours  busPIRone 10 milliGRAM(s) Oral two times a day  cholecalciferol 2000 Unit(s) Oral at bedtime  cyanocobalamin 1000 MICROGram(s) Oral at bedtime  diltiazem    milliGRAM(s) Oral daily  doxazosin 1 milliGRAM(s) Oral at bedtime  furosemide   Injectable 40 milliGRAM(s) IV Push two times a day  gabapentin 400 milliGRAM(s) Oral three times a day  hemorrhoidal Ointment 1 Application(s) Rectal two times a day  magnesium oxide 400 milliGRAM(s) Oral two times a day with meals  melatonin 5 milliGRAM(s) Oral at bedtime  methylPREDNISolone sodium succinate Injectable 40 milliGRAM(s) IV Push every 8 hours  metoprolol tartrate 25 milliGRAM(s) Oral two times a day  pantoprazole    Tablet 40 milliGRAM(s) Oral before breakfast  QUEtiapine 100 milliGRAM(s) Oral at bedtime  spironolactone 25 milliGRAM(s) Oral daily  thiamine 100 milliGRAM(s) Oral at bedtime    MEDICATIONS  (PRN):  acetaminophen     Tablet .. 650 milliGRAM(s) Oral every 6 hours PRN Temp greater or equal to 38C (100.4F), Mild Pain (1 - 3)  aluminum hydroxide/magnesium hydroxide/simethicone Suspension 30 milliLiter(s) Oral every 4 hours PRN Dyspepsia  metoprolol tartrate Injectable 5 milliGRAM(s) IV Push every 6 hours PRN HR>130  ondansetron Injectable 4 milliGRAM(s) IV Push every 8 hours PRN Nausea and/or Vomiting      Allergies    Duricef (Rash)  Ceclor (Rash)    Intolerances        Vital Signs Last 24 Hrs  T(C): 36.4 (29 May 2023 07:32), Max: 36.7 (28 May 2023 21:30)  T(F): 97.5 (29 May 2023 07:32), Max: 98 (28 May 2023 21:30)  HR: 86 (29 May 2023 07:32) (85 - 102)  BP: 136/70 (29 May 2023 07:32) (136/70 - 156/78)  BP(mean): --  RR: 18 (29 May 2023 07:32) (18 - 24)  SpO2: 97% (29 May 2023 07:32) (93% - 97%)    Parameters below as of 29 May 2023 07:32  Patient On (Oxygen Delivery Method): nasal cannula        PHYSICAL EXAMINATION:    NECK:  Supple. No lymphadenopathy. Jugular venous pressure not elevated.   HEART:   The cardiac impulse has a normal quality. Reg., Nl S1 and S2.    CHEST:  Chest with bibasilar crackles. Scattered exp. wheezes. Increased respiratory effort.  ABDOMEN:  Soft and nontender.   EXTREMITIES:  There is tr chronic edema.       LABS:                        11.6   5.66  )-----------( 138      ( 29 May 2023 08:36 )             36.3     05-29    x   |  x   |  18  ----------------------------<  x   x    |  x   |  x     Ca    8.7      28 May 2023 06:51  Phos  3.3     05-  Mg     2.1     05-    TPro  7.2  /  Alb  2.9<L>  /  TBili  0.5  /  DBili  x   /  AST  25  /  ALT  19  /  AlkPhos  92  05-28      Urinalysis Basic - ( 27 May 2023 13:46 )    Color: Yellow / Appearance: Clear / S.015 / pH: x  Gluc: x / Ketone: Negative  / Bili: Negative / Urobili: Negative   Blood: x / Protein: Negative / Nitrite: Negative   Leuk Esterase: Negative / RBC: 11-25 /HPF / WBC 0-2 /HPF   Sq Epi: x / Non Sq Epi: x / Bacteria: Occasional        RADIOLOGY & ADDITIONAL TESTS:    Assessment and Recommendation:     - patient now on 5L nasal canula  - cont duoneb. Add budesonide  - Maintain steroids- 40Mg Q8H  - Refused bipap  - in afib w RVR; not on AC    Problem/Recommendation - 1:  ·  Acute on chronic respiratory failure with hypoxia and hypercapnia.   Problem/Recommendation - 2:  ·  Infection due to parainfluenza virus 3.   Problem/Recommendation - 3:  ·  VIJAY and COPD overlap syndrome.   Problem/Recommendation - 4:  ·  Acute bronchospasm.   Problem/Recommendation - 5:  ·  Pulmonary hypertension.   Problem/Recommendation - 6:  ·  Obesity hypoventilation syndrome.   Problem/Recommendation - 7:  ·  ILD (interstitial lung disease).

## 2023-05-29 NOTE — PHYSICAL THERAPY INITIAL EVALUATION ADULT - PERTINENT HX OF CURRENT PROBLEM, REHAB EVAL
67 y/o male with PMH of HFpEF, permanent atrial fibrillation, Watchman Implant, MR , COPD on 4 L of home O2,  ETOH induced cirrhosis,  HTN, obesity, VIJAY (noncompliant w/ CPAP), alcoholism, pulmonary HTN,  Left BKA  presents to  with 1 day history of difficulty breathing, productive cough, wheezing.  Patient states that he has had worsening shortness of breath since yesterday and this evening he had much worsening of symptoms.  Brought in by EMS who stated he was very short of breath and oxygen was in the high 80s/low 90s upon arrival.  Improved with supplemental oxygen and albuterol nebulizer treatment. Patient also reports episode of rectal bleeding episode at home.

## 2023-05-29 NOTE — PROVIDER CONTACT NOTE (CRITICAL VALUE NOTIFICATION) - SITUATION
pt admitted ARF and diff breathing CPOD and CHF. AAO4 but more sleepy/lethargic this morning. falling asleep eyes closing while talking to him

## 2023-05-29 NOTE — PHYSICAL THERAPY INITIAL EVALUATION ADULT - DIAGNOSIS, PT EVAL
Acute on chronic hypoxic respiratory failure , multifactorial, Acute bronchospasm due to Parainfluenza viral syndrome, r/o PNA

## 2023-05-29 NOTE — PROGRESS NOTE ADULT - PROBLEM SELECTOR PLAN 1
check TTE, GDMT with BB/MRA, diuresis with lasix 40 ivp bid, daily weight, fluid restriction 1.5L/day, recheck proBNP after 48 hrs of diuresis.

## 2023-05-29 NOTE — PROGRESS NOTE ADULT - SUBJECTIVE AND OBJECTIVE BOX
Chief Complaint: Dyspnea, cough, congestion    Interval Hx: This AM patient was noted to be a bit drowsy. Blood gas consistent with CO2 retention, in setting of his nonadherence to NIPPV. Discussed further with him on rounds and he is in agreement with NIPPV use. No chest pain mauricio tightness. He does have dyspnea, occasional cough. No other acute complaints. No fevers or rigors.      ROS: Multi system review is comprehensively negative x 10 systems except as above    Vitals:  T(F): 97.3 (29 May 2023 20:30), Max: 98 (28 May 2023 21:30)  HR: 100 (29 May 2023 20:30) (85 - 102)  BP: 141/68 (29 May 2023 20:30) (136/70 - 156/78)  RR: 20 (29 May 2023 20:30) (18 - 24)  SpO2: 97% (29 May 2023 20:30) (92% - 97%) on 4L/min via NC    Exam:   GEN: No acute distress  HEENT: NCAT PERRL EOMI MMM clear oropharynx  NECK: Supple  CHEST: Normal resp effort, diminished breath sound B/L, + wheeze  HEART: S1 S2 normal, regular, tachycardic with HR 90s  ABD: Soft, Non-tender, Non-distended; Bowel sounds present,   EXT: 2+ Peripheral Pulses, No clubbing, cyanosis, edema   SKIN: No rash, no lesion  NEURO: A+OX3, no gross deficits    Labs:                        11.6   5.66  )-----------( 138               36.3     BMP reviewed      TPro  7.2  /  Alb  2.9<L>  /  TBili  0.5  /  DBili  x   /  AST  25  /  ALT  19  /  AlkPhos  92  -28    pH, Arterial: 7.31  pH, Blood: x     /  pCO2: 79    /  pO2: 101   / HCO3: 40    / Base Excess: 10.2  /  SaO2: 99        Urinalysis   Color: Yellow / Appearance: Clear / S.015 / pH: x  Gluc: x / Ketone: Negative  / Bili: Negative / Urobili: Negative   Blood: x / Protein: Negative / Nitrite: Negative   Leuk Esterase: Negative / RBC: 11-25 /HPF / WBC 0-2 /HPF   Sq Epi: x / Non Sq Epi: x / Bacteria: Occasional    Imaging:  XR ankle R 5/27: no fracture, dislocation, soft tissue swelling or joint effusion. Degenerative changes at the ankle joint. Small plantar and retrocalcaneal spurs. Degenerative changes of the talonavicular joint. No radiopaque foreign body.    US Duplex Venous Lower Ext Complete, Bilateral : No evidence of deep venous thrombosis in either lower extremity.    CT Abdomen and Pelvis WO : Cirrhotic liver with mild splenomegaly. No ascites. Mildly enlarged 3.1cm splenic hypodense low-attenuation lesion of unclear etiology, possibly a cyst. Consider abdominal MRI for further evaluation of these findings. No bowel distention seen.    CT Chest WO : 1. New 9 mm left upper lobe nodular opacity and nonspecific dependent groundglass may be infectious or inflammatory. Additional peripheral   reticular opacities likely reflect component of chronic interstitial lung disease. Slightlyenlarged 5 mm right lower lobe pulmonary nodule since , of unclear significance. Follow-up chest CT is recommended in 6 weeks to evaluate for stability versus resolution of these findings. Cardiac enlargement, multivessel coronary artery calcification, and Watchman device noted. Significantly enlarged main pulmonary artery likely reflects pulmonary arterial hypertension.    Micro:  Urine culture : Negative  Blood culture : Negative    Cardiac Testing  EKG : Afib with RVR    Meds:  MEDICATIONS  (STANDING):  albuterol/ipratropium for Nebulization 3 milliLiter(s) Nebulizer every 6 hours  azithromycin  IVPB 500 milliGRAM(s) IV Intermittent every 24 hours  bisacodyl 5 milliGRAM(s) Oral every 12 hours  buDESOnide    Inhalation Suspension 0.5 milliGRAM(s) Inhalation every 12 hours  busPIRone 10 milliGRAM(s) Oral two times a day  cholecalciferol 2000 Unit(s) Oral at bedtime  cyanocobalamin 1000 MICROGram(s) Oral at bedtime  diltiazem    milliGRAM(s) Oral daily  doxazosin 1 milliGRAM(s) Oral at bedtime  furosemide   Injectable 40 milliGRAM(s) IV Push two times a day  gabapentin 400 milliGRAM(s) Oral three times a day  hemorrhoidal Ointment 1 Application(s) Rectal two times a day  magnesium oxide 400 milliGRAM(s) Oral two times a day with meals  melatonin 5 milliGRAM(s) Oral at bedtime  methylPREDNISolone sodium succinate Injectable 40 milliGRAM(s) IV Push every 8 hours  metoprolol tartrate 25 milliGRAM(s) Oral two times a day  pantoprazole    Tablet 40 milliGRAM(s) Oral before breakfast  QUEtiapine 100 milliGRAM(s) Oral at bedtime  spironolactone 25 milliGRAM(s) Oral daily  thiamine 100 milliGRAM(s) Oral at bedtime    MEDICATIONS  (PRN):  acetaminophen     Tablet .. 650 milliGRAM(s) Oral every 6 hours PRN Temp greater or equal to 38C (100.4F), Mild Pain (1 - 3)  aluminum hydroxide/magnesium hydroxide/simethicone Suspension 30 milliLiter(s) Oral every 4 hours PRN Dyspepsia  metoprolol tartrate Injectable 5 milliGRAM(s) IV Push every 6 hours PRN HR>130  ondansetron Injectable 4 milliGRAM(s) IV Push every 8 hours PRN Nausea and/or Vomiting

## 2023-05-29 NOTE — PROGRESS NOTE ADULT - ASSESSMENT
69 y/o man with obesity, VIJAY non compliant with CPAP, HTN, HFpEF, permanent afib s/p Watchman, mitral regurgitation, peripheral vascular disease s/p L BKA, COPD, pHTN, chronic resp failure on home O2 4L/min, alcoholic cirrhosis, presented with difficulty breathing, productive cough, wheezing.  In the ED, he was afebrile, BP WNL, , SPO2 97%. Labs with negative troponin, , lactate 2.2. EKG afib RVR. CXR with mild pulm edema. RVP PCR panel (+) for parainfluenza infection. He was given aspirin, acetaminophen, Lasix IV and duoneb, placed on NIPPV, subsequently improved to a modest degree and was admitted to Medicine thereafter.     Acute on chronic hypoxic and hypercapneic respiratory failure  Multifactorial due to parainfluenza infection, unable to rule out viral pneumonia, present upon admission, acute on chronic diastolic CHF, COPD with exacerbation, pulmonary HTN.   - Continue to treat underlying issues  - O2 supplementation (on home O2)  - NIPPV with BIPAP at night    Parainfluenza infection, unable to rule out viral pneumonia, present upon admission  Still with dyspnea and cough but gradually improving  - Continue supportive care measures    COPD with exacerbation  Wheeze on exam. No resp distress.   - Continue solumedrol, budesonide neb q12, duoneb q6    Acute on chronic diastolic CHF  No angina. Troponin negative. Initial EKG afib RVR which may have contributed to the CHF exacerbation.  - Continue Iv Lasix 40mg BID  - Continue metoprolol   - IS and OS, daily wt  - Trend lytes and Cr    Permanent afib, RVR  HR still a bit suboptimally controlled in setting of parainfluenza infection. On metoprolol and diltizem CD  - Continue metoprolol and diltiazem CD  - Not on AC as patient has hx of Watchman placement    Pulmonary nodules in MICHELLE and RLL  As noted on CT  - Continued monitoring as outpatient    Normocytic anemia  Did have an episode of rectal bleeding, suspected to be hemorrhoidal prior to admission. He does have hx of R ischemic colitis in past. And underlying cirrhosis / alcohol use disorder that could also be responsible for his chronic anemia  - Continue to monitor, no overt bleeding at this time  - Per Dr Barker, outpatient EGD and colonoscopy once respiratory status improving     Hypomagnesemia  Improved with supplementation    Hypocalcemia due to severe vit D deficiency   Vit D replacement      DVT px: LMWH  Code status: Full  Emergency contact: DaughterDorota [phone]   Dispo: To be determined pending further clinical assessment

## 2023-05-29 NOTE — PROGRESS NOTE ADULT - SUBJECTIVE AND OBJECTIVE BOX
PCP:    REQUESTING PHYSICIAN:    REASON FOR CONSULT:    CHIEF COMPLAINT:    HPI:  69 y/o male with PMH of HFpEF, parox atrial fibrillation, Watchman Implant, MR , COPD on 4 L of home O2,  ETOH induced cirrhosis,  HTN, obesity, VIJAY (noncompliant w/ CPAP), alcoholism, pulmonary HTN,  Left BKA  presents to  with 1 day history of difficulty breathing, productive cough, wheezing.  Patient states that he has had worsening shortness of breath since yesterday and this evening he had much worsening of symptoms.  Brought in by EMS who stated he was very short of breath and oxygen was in the high 80s/low 90s upon arrival.  Improved with supplemental oxygen and albuterol nebulizer treatment. Patient also reports episode of rectal bleeding episode at home.     in ED - Vital sings  T  Max: 37.1, T(F) Max: 98.7 , HR: 106  (106 - 110), BP: 128/66 (124/56 - 128/66) RR: 16  (16 - 30) SpO2: 97%  (86% - 100%) EKG  A fib 112, Lactate 2.2--> 1.4, Mg 1.3, troponin 7.5 , , venous blood gas Ph 7.34 Parainfluenza positive, CXR - mild pulmonary edema, s/p , tylenol, duoneb, lasix 80 s/p BIPAP   23: Pt feels improved.        (27 May 2023 09:54)      PAST MEDICAL & SURGICAL HISTORY:  Chronic atrial fibrillation      Alcohol abuse      Poor historian      CHF (congestive heart failure)      Cirrhosis      Emphysema of lung      Alcohol withdrawal      Collapsed lung      Meningitis      Falls      Anemia      Chronic obstructive pulmonary disease (COPD)      GERD (gastroesophageal reflux disease)      Hypertension      Presence of Watchman left atrial appendage closure device      Atrial fibrillation      COPD without exacerbation      Chronic CHF      S/P BKA (below knee amputation) unilateral, left      Presence of Watchman left atrial appendage closure device      Unilateral amputation of lower extremity below knee      H/O tracheostomy  now removed      S/P percutaneous endoscopic gastrostomy (PEG) tube placement  now removed          SOCIAL HISTORY:    FAMILY HISTORY:  No pertinent family history in first degree relatives        ALLERGIES:  Allergies    Duricef (Rash)  Ceclor (Rash)    Intolerances        MEDICATIONS:    MEDICATIONS  (STANDING):  albuterol/ipratropium for Nebulization 3 milliLiter(s) Nebulizer every 6 hours  azithromycin  IVPB      azithromycin  IVPB 500 milliGRAM(s) IV Intermittent every 24 hours  bisacodyl 5 milliGRAM(s) Oral every 12 hours  buDESOnide    Inhalation Suspension 0.5 milliGRAM(s) Inhalation every 12 hours  busPIRone 10 milliGRAM(s) Oral two times a day  cholecalciferol 2000 Unit(s) Oral at bedtime  cyanocobalamin 1000 MICROGram(s) Oral at bedtime  diltiazem    milliGRAM(s) Oral daily  doxazosin 1 milliGRAM(s) Oral at bedtime  furosemide   Injectable 40 milliGRAM(s) IV Push two times a day  gabapentin 400 milliGRAM(s) Oral three times a day  hemorrhoidal Ointment 1 Application(s) Rectal two times a day  magnesium oxide 400 milliGRAM(s) Oral two times a day with meals  melatonin 5 milliGRAM(s) Oral at bedtime  methylPREDNISolone sodium succinate Injectable 40 milliGRAM(s) IV Push every 8 hours  metoprolol tartrate 25 milliGRAM(s) Oral two times a day  pantoprazole    Tablet 40 milliGRAM(s) Oral before breakfast  QUEtiapine 100 milliGRAM(s) Oral at bedtime  spironolactone 25 milliGRAM(s) Oral daily  thiamine 100 milliGRAM(s) Oral at bedtime    MEDICATIONS  (PRN):  acetaminophen     Tablet .. 650 milliGRAM(s) Oral every 6 hours PRN Temp greater or equal to 38C (100.4F), Mild Pain (1 - 3)  aluminum hydroxide/magnesium hydroxide/simethicone Suspension 30 milliLiter(s) Oral every 4 hours PRN Dyspepsia  metoprolol tartrate Injectable 5 milliGRAM(s) IV Push every 6 hours PRN HR>130  ondansetron Injectable 4 milliGRAM(s) IV Push every 8 hours PRN Nausea and/or Vomiting        Vital Signs Last 24 Hrs  T(C): 36.4 (29 May 2023 07:32), Max: 36.7 (28 May 2023 21:30)  T(F): 97.5 (29 May 2023 07:32), Max: 98 (28 May 2023 21:30)  HR: 86 (29 May 2023 07:32) (85 - 102)  BP: 136/70 (29 May 2023 07:32) (136/70 - 156/78)  BP(mean): --  RR: 18 (29 May 2023 07:32) (18 - 24)  SpO2: 97% (29 May 2023 07:32) (93% - 97%)    Parameters below as of 29 May 2023 07:32  Patient On (Oxygen Delivery Method): nasal cannula    Daily     Daily Weight in k.4 (29 May 2023 07:20)I&O's Summary      PHYSICAL EXAM:    Constitutional: NAD, awake and alert, well-developed  HEENT: PERR, EOMI,  No oral cyananosis.  Neck:  supple,  No JVD  Respiratory: Crackles bases  Cardiovascular: S1 and S2, regular rate and rhythm, no Murmurs, gallops or rubs  Gastrointestinal: Bowel Sounds present, soft, nontender.   Extremities: No peripheral edema. No clubbing or cyanosis.  Vascular: 2+ peripheral pulses  Neurological: A/O x 3, no focal deficits  Musculoskeletal: no calf tenderness.  Skin: No rashes.      LABS: All Labs Reviewed:                        11.6   5.66  )-----------( 138      ( 29 May 2023 08:36 )             36.3                         12.1   5.55  )-----------( 149      ( 28 May 2023 06:51 )             38.5                         11.7   5.48  )-----------( 129      ( 27 May 2023 09:22 )             36.1     29 May 2023 08:36    x      |  x      |  18     ----------------------------<  x      x       |  x      |  x      28 May 2023 06:51    135    |  93     |  12     ----------------------------<  100    4.1     |  38     |  0.80   27 May 2023 09:22    137    |  96     |  11     ----------------------------<  109    3.5     |  37     |  0.93     Ca    8.7        28 May 2023 06:51  Ca    8.4        27 May 2023 09:22  Ca    8.4        27 May 2023 01:23  Phos  3.3       29 May 2023 08:36  Phos  3.4       28 May 2023 06:51  Phos  3.9       27 May 2023 09:22  Mg     2.1       29 May 2023 08:36  Mg     2.0       28 May 2023 06:51  Mg     1.4       27 May 2023 09:22    TPro  7.2    /  Alb  2.9    /  TBili  0.5    /  DBili  x      /  AST  25     /  ALT  19     /  AlkPhos  92     28 May 2023 06:51  TPro  7.1    /  Alb  3.0    /  TBili  0.5    /  DBili  x      /  AST  15     /  ALT  18     /  AlkPhos  95     27 May 2023 09:22  TPro  7.2    /  Alb  2.9    /  TBili  0.4    /  DBili  x      /  AST  21     /  ALT  22     /  AlkPhos  97     27 May 2023 01:23      CARDIAC MARKERS ( 29 May 2023 08:36 )  x     / x     / 65 U/L / x     / x          Blood Culture: Organism --  Gram Stain Blood -- Gram Stain --  Specimen Source Clean Catch None  Culture-Blood --    Organism --  Gram Stain Blood -- Gram Stain --  Specimen Source .Blood Blood-Peripheral  Culture-Blood --         @ 09:22  TSH: 0.96      RADIOLOGY/EKG:      ECHO/CARDIAC CATHTERIZATION/STRESS TEST:

## 2023-05-30 DIAGNOSIS — R91.1 SOLITARY PULMONARY NODULE: ICD-10-CM

## 2023-05-30 LAB
ALBUMIN SERPL ELPH-MCNC: 3 G/DL — LOW (ref 3.3–5)
ALP SERPL-CCNC: 80 U/L — SIGNIFICANT CHANGE UP (ref 40–120)
ALT FLD-CCNC: 19 U/L — SIGNIFICANT CHANGE UP (ref 12–78)
ANION GAP SERPL CALC-SCNC: 2 MMOL/L — LOW (ref 5–17)
AST SERPL-CCNC: 17 U/L — SIGNIFICANT CHANGE UP (ref 15–37)
BASOPHILS # BLD AUTO: 0.01 K/UL — SIGNIFICANT CHANGE UP (ref 0–0.2)
BASOPHILS NFR BLD AUTO: 0.1 % — SIGNIFICANT CHANGE UP (ref 0–2)
BILIRUB DIRECT SERPL-MCNC: 0.1 MG/DL — SIGNIFICANT CHANGE UP (ref 0–0.3)
BILIRUB INDIRECT FLD-MCNC: 0.3 MG/DL — SIGNIFICANT CHANGE UP (ref 0.2–1)
BILIRUB SERPL-MCNC: 0.4 MG/DL — SIGNIFICANT CHANGE UP (ref 0.2–1.2)
BUN SERPL-MCNC: 22 MG/DL — SIGNIFICANT CHANGE UP (ref 7–23)
CALCIUM SERPL-MCNC: 9.1 MG/DL — SIGNIFICANT CHANGE UP (ref 8.5–10.1)
CHLORIDE SERPL-SCNC: 93 MMOL/L — LOW (ref 96–108)
CO2 SERPL-SCNC: 41 MMOL/L — HIGH (ref 22–31)
CREAT SERPL-MCNC: 0.75 MG/DL — SIGNIFICANT CHANGE UP (ref 0.5–1.3)
EGFR: 100 ML/MIN/1.73M2 — SIGNIFICANT CHANGE UP
EOSINOPHIL # BLD AUTO: 0 K/UL — SIGNIFICANT CHANGE UP (ref 0–0.5)
EOSINOPHIL NFR BLD AUTO: 0 % — SIGNIFICANT CHANGE UP (ref 0–6)
GLUCOSE SERPL-MCNC: 158 MG/DL — HIGH (ref 70–99)
HCT VFR BLD CALC: 36.6 % — LOW (ref 39–50)
HGB BLD-MCNC: 11.9 G/DL — LOW (ref 13–17)
IMM GRANULOCYTES NFR BLD AUTO: 0.6 % — SIGNIFICANT CHANGE UP (ref 0–0.9)
LYMPHOCYTES # BLD AUTO: 0.56 K/UL — LOW (ref 1–3.3)
LYMPHOCYTES # BLD AUTO: 5.3 % — LOW (ref 13–44)
MCHC RBC-ENTMCNC: 31.4 PG — SIGNIFICANT CHANGE UP (ref 27–34)
MCHC RBC-ENTMCNC: 32.5 GM/DL — SIGNIFICANT CHANGE UP (ref 32–36)
MCV RBC AUTO: 96.6 FL — SIGNIFICANT CHANGE UP (ref 80–100)
MONOCYTES # BLD AUTO: 0.22 K/UL — SIGNIFICANT CHANGE UP (ref 0–0.9)
MONOCYTES NFR BLD AUTO: 2.1 % — SIGNIFICANT CHANGE UP (ref 2–14)
NEUTROPHILS # BLD AUTO: 9.78 K/UL — HIGH (ref 1.8–7.4)
NEUTROPHILS NFR BLD AUTO: 91.9 % — HIGH (ref 43–77)
PLATELET # BLD AUTO: 153 K/UL — SIGNIFICANT CHANGE UP (ref 150–400)
POTASSIUM SERPL-MCNC: 4.1 MMOL/L — SIGNIFICANT CHANGE UP (ref 3.5–5.3)
POTASSIUM SERPL-SCNC: 4.1 MMOL/L — SIGNIFICANT CHANGE UP (ref 3.5–5.3)
PROT SERPL-MCNC: 7.3 GM/DL — SIGNIFICANT CHANGE UP (ref 6–8.3)
RBC # BLD: 3.79 M/UL — LOW (ref 4.2–5.8)
RBC # FLD: 13.6 % — SIGNIFICANT CHANGE UP (ref 10.3–14.5)
SODIUM SERPL-SCNC: 136 MMOL/L — SIGNIFICANT CHANGE UP (ref 135–145)
WBC # BLD: 10.63 K/UL — HIGH (ref 3.8–10.5)
WBC # FLD AUTO: 10.63 K/UL — HIGH (ref 3.8–10.5)

## 2023-05-30 PROCEDURE — 99232 SBSQ HOSP IP/OBS MODERATE 35: CPT

## 2023-05-30 PROCEDURE — 99233 SBSQ HOSP IP/OBS HIGH 50: CPT

## 2023-05-30 RX ADMIN — Medication 3 MILLILITER(S): at 02:43

## 2023-05-30 RX ADMIN — Medication 0.5 MILLIGRAM(S): at 20:51

## 2023-05-30 RX ADMIN — Medication 10 MILLIGRAM(S): at 09:22

## 2023-05-30 RX ADMIN — Medication 3 MILLILITER(S): at 13:11

## 2023-05-30 RX ADMIN — AZITHROMYCIN 255 MILLIGRAM(S): 500 TABLET, FILM COATED ORAL at 10:19

## 2023-05-30 RX ADMIN — GABAPENTIN 400 MILLIGRAM(S): 400 CAPSULE ORAL at 22:07

## 2023-05-30 RX ADMIN — Medication 5 MILLIGRAM(S): at 22:08

## 2023-05-30 RX ADMIN — Medication 650 MILLIGRAM(S): at 17:07

## 2023-05-30 RX ADMIN — MAGNESIUM OXIDE 400 MG ORAL TABLET 400 MILLIGRAM(S): 241.3 TABLET ORAL at 07:50

## 2023-05-30 RX ADMIN — GABAPENTIN 400 MILLIGRAM(S): 400 CAPSULE ORAL at 14:20

## 2023-05-30 RX ADMIN — Medication 5 MILLIGRAM(S): at 09:23

## 2023-05-30 RX ADMIN — Medication 40 MILLIGRAM(S): at 06:07

## 2023-05-30 RX ADMIN — SPIRONOLACTONE 25 MILLIGRAM(S): 25 TABLET, FILM COATED ORAL at 09:23

## 2023-05-30 RX ADMIN — QUETIAPINE FUMARATE 100 MILLIGRAM(S): 200 TABLET, FILM COATED ORAL at 22:10

## 2023-05-30 RX ADMIN — Medication 25 MILLIGRAM(S): at 09:23

## 2023-05-30 RX ADMIN — Medication 2000 UNIT(S): at 22:09

## 2023-05-30 RX ADMIN — Medication 25 MILLIGRAM(S): at 22:07

## 2023-05-30 RX ADMIN — ENOXAPARIN SODIUM 40 MILLIGRAM(S): 100 INJECTION SUBCUTANEOUS at 22:05

## 2023-05-30 RX ADMIN — PANTOPRAZOLE SODIUM 40 MILLIGRAM(S): 20 TABLET, DELAYED RELEASE ORAL at 06:07

## 2023-05-30 RX ADMIN — PHENYLEPHRINE-SHARK LIVER OIL-MINERAL OIL-PETROLATUM RECTAL OINTMENT 1 APPLICATION(S): at 06:10

## 2023-05-30 RX ADMIN — Medication 100 MILLIGRAM(S): at 22:07

## 2023-05-30 RX ADMIN — Medication 1 MILLIGRAM(S): at 22:09

## 2023-05-30 RX ADMIN — Medication 40 MILLIGRAM(S): at 17:12

## 2023-05-30 RX ADMIN — GABAPENTIN 400 MILLIGRAM(S): 400 CAPSULE ORAL at 06:07

## 2023-05-30 RX ADMIN — Medication 10 MILLIGRAM(S): at 22:08

## 2023-05-30 RX ADMIN — Medication 3 MILLILITER(S): at 08:06

## 2023-05-30 RX ADMIN — Medication 3 MILLILITER(S): at 20:35

## 2023-05-30 RX ADMIN — Medication 0.5 MILLIGRAM(S): at 08:06

## 2023-05-30 RX ADMIN — Medication 650 MILLIGRAM(S): at 16:07

## 2023-05-30 RX ADMIN — PREGABALIN 1000 MICROGRAM(S): 225 CAPSULE ORAL at 22:09

## 2023-05-30 RX ADMIN — Medication 180 MILLIGRAM(S): at 09:23

## 2023-05-30 RX ADMIN — PHENYLEPHRINE-SHARK LIVER OIL-MINERAL OIL-PETROLATUM RECTAL OINTMENT 1 APPLICATION(S): at 17:14

## 2023-05-30 RX ADMIN — Medication 40 MILLIGRAM(S): at 14:18

## 2023-05-30 RX ADMIN — Medication 5 MILLIGRAM(S): at 22:07

## 2023-05-30 NOTE — PROGRESS NOTE ADULT - SUBJECTIVE AND OBJECTIVE BOX
PCP:    REQUESTING PHYSICIAN:    REASON FOR CONSULT:    CHIEF COMPLAINT:    HPI:  67 y/o male with PMH of HFpEF, parox atrial fibrillation, Watchman Implant, MR , COPD on 4 L of home O2,  ETOH induced cirrhosis,  HTN, obesity, VIJAY (noncompliant w/ CPAP), alcoholism, pulmonary HTN,  Left BKA  presents to  with 1 day history of difficulty breathing, productive cough, wheezing.  Patient states that he has had worsening shortness of breath since yesterday and this evening he had much worsening of symptoms.  Brought in by EMS who stated he was very short of breath and oxygen was in the high 80s/low 90s upon arrival.  Improved with supplemental oxygen and albuterol nebulizer treatment. Patient also reports episode of rectal bleeding episode at home.     in ED - [facility]  T  Max: 37.1, T(F) Max: 98.7 , HR: 106  (106 - 110), BP: 128/66 (124/56 - 128/66) RR: 16  (16 - 30) SpO2: 97%  (86% - 100%) EKG  A fib 112, Lactate 2.2--> 1.4, Mg 1.3, troponin 7.5 , , venous blood gas Ph 7.34 Parainfluenza positive, CXR - mild pulmonary edema, s/p , tylenol, duoneb, lasix 80 s/p BIPAP   23: Pt feels improved.   23: Pt denies chest pain.  Still has shortness of breath.  Tele: afib rate controlled.       (27 May 2023 09:54)      PAST MEDICAL & SURGICAL HISTORY:  Chronic atrial fibrillation      Alcohol abuse      Poor historian      CHF (congestive heart failure)      Cirrhosis      Emphysema of lung      Alcohol withdrawal      Collapsed lung      Meningitis      Falls      Anemia      Chronic obstructive pulmonary disease (COPD)      GERD (gastroesophageal reflux disease)      Hypertension      Presence of Watchman left atrial appendage closure device      Atrial fibrillation      COPD without exacerbation      Chronic CHF      S/P BKA (below knee amputation) unilateral, left      Presence of Watchman left atrial appendage closure device      Unilateral amputation of lower extremity below knee      H/O tracheostomy  now removed      S/P percutaneous endoscopic gastrostomy (PEG) tube placement  now removed          SOCIAL HISTORY:    FAMILY HISTORY:  No pertinent family history in first degree relatives        ALLERGIES:  Allergies    Duricef (Rash)  Ceclor (Rash)    Intolerances      MEDICATIONS  (STANDING):  albuterol/ipratropium for Nebulization 3 milliLiter(s) Nebulizer every 6 hours  azithromycin  IVPB      azithromycin  IVPB 500 milliGRAM(s) IV Intermittent every 24 hours  bisacodyl 5 milliGRAM(s) Oral every 12 hours  buDESOnide    Inhalation Suspension 0.5 milliGRAM(s) Inhalation every 12 hours  busPIRone 10 milliGRAM(s) Oral two times a day  cholecalciferol 2000 Unit(s) Oral at bedtime  cyanocobalamin 1000 MICROGram(s) Oral at bedtime  diltiazem    milliGRAM(s) Oral daily  doxazosin 1 milliGRAM(s) Oral at bedtime  enoxaparin Injectable 40 milliGRAM(s) SubCutaneous every 24 hours  furosemide   Injectable 40 milliGRAM(s) IV Push two times a day  gabapentin 400 milliGRAM(s) Oral three times a day  hemorrhoidal Ointment 1 Application(s) Rectal two times a day  melatonin 5 milliGRAM(s) Oral at bedtime  methylPREDNISolone sodium succinate Injectable 40 milliGRAM(s) IV Push every 12 hours  metoprolol tartrate 25 milliGRAM(s) Oral two times a day  pantoprazole    Tablet 40 milliGRAM(s) Oral before breakfast  QUEtiapine 100 milliGRAM(s) Oral at bedtime  spironolactone 25 milliGRAM(s) Oral daily  thiamine 100 milliGRAM(s) Oral at bedtime    MEDICATIONS  (PRN):  acetaminophen     Tablet .. 650 milliGRAM(s) Oral every 6 hours PRN Temp greater or equal to 38C (100.4F), Mild Pain (1 - 3)  aluminum hydroxide/magnesium hydroxide/simethicone Suspension 30 milliLiter(s) Oral every 4 hours PRN Dyspepsia  metoprolol tartrate Injectable 5 milliGRAM(s) IV Push every 6 hours PRN HR>130  ondansetron Injectable 4 milliGRAM(s) IV Push every 8 hours PRN Nausea and/or Vomiting      Vital Signs Last 24 Hrs  T(C): 36.4 (30 May 2023 08:08), Max: 36.4 (30 May 2023 08:08)  T(F): 97.6 (30 May 2023 08:08), Max: 97.6 (30 May 2023 08:08)  HR: 92 (30 May 2023 08:08) (88 - 100)  BP: 121/56 (30 May 2023 08:08) (115/66 - 141/68)  BP(mean): --  RR: 20 (30 May 2023 08:08) (20 - 20)  SpO2: 93% (30 May 2023 08:08) (92% - 97%)    Parameters below as of 30 May 2023 08:08  Patient On (Oxygen Delivery Method): nasal cannula    Daily     Daily Weight in k.5 (30 May 2023 06:00)  Daily     Daily Weight in k.4 (29 May 2023 07:20)I&O's Summary      PHYSICAL EXAM:    Constitutional: NAD, awake and alert, well-developed  HEENT: PERR, EOMI,  No oral cyananosis.  Neck:  supple,  No JVD  Respiratory: exp wheezing noted   Cardiovascular: S1 and S2,irreg rhythm, no Murmurs, gallops or rubs  Gastrointestinal: Bowel Sounds present, soft, nontender.   Extremities: L BKA, right no peripheral edema. No clubbing or cyanosis,  2+ peripheral pulses  Neurological: A/O x 3, no focal deficits  Musculoskeletal: no calf tenderness.  Skin: No rashes.      LABS: All Labs Reviewed:                          11.9   10.63 )-----------( 153      ( 30 May 2023 07:13 )             36.6                           11.6   5.66  )-----------( 138      ( 29 May 2023 08:36 )             36.3                         12.1   5.55  )-----------( 149      ( 28 May 2023 06:51 )             38.5                         11.7   5.48  )-----------( 129      ( 27 May 2023 09:22 )             36.1     05-30    136  |  93<L>  |  22  ----------------------------<  158<H>  4.1   |  41<H>  |  0.75    Ca    9.1      30 May 2023 07:13  Phos  3.3     05-  Mg     2.1     05    TPro  7.3  /  Alb  3.0<L>  /  TBili  0.4  /  DBili  0.1  /  AST  17  /  ALT  19  /  AlkPhos  80  30      29 May 2023 08:36    x      |  x      |  18     ----------------------------<  x      x       |  x      |  x      28 May 2023 06:51    135    |  93     |  12     ----------------------------<  100    4.1     |  38     |  0.80   27 May 2023 09:22    137    |  96     |  11     ----------------------------<  109    3.5     |  37     |  0.93     Ca    8.7        28 May 2023 06:51  Ca    8.4        27 May 2023 09:22  Ca    8.4        27 May 2023 01:23  Phos  3.3       29 May 2023 08:36  Phos  3.4       28 May 2023 06:51  Phos  3.9       27 May 2023 09:22  Mg     2.1       29 May 2023 08:36  Mg     2.0       28 May 2023 06:51  Mg     1.4       27 May 2023 09:22    TPro  7.2    /  Alb  2.9    /  TBili  0.5    /  DBili  x      /  AST  25     /  ALT  19     /  AlkPhos  92     28 May 2023 06:51  TPro  7.1    /  Alb  3.0    /  TBili  0.5    /  DBili  x      /  AST  15     /  ALT  18     /  AlkPhos  95     27 May 2023 09:22  TPro  7.2    /  Alb  2.9    /  TBili  0.4    /  DBili  x      /  AST  21     /  ALT  22     /  AlkPhos  97     27 May 2023 01:23      CARDIAC MARKERS ( 29 May 2023 08:36 )  x     / x     / 65 U/L / x     / x          Blood Culture: Organism --  Gram Stain Blood -- Gram Stain --  Specimen Source Clean Catch None  Culture-Blood --    Organism --  Gram Stain Blood -- Gram Stain --  Specimen Source .Blood Blood-Peripheral  Culture-Blood --         @ 09:22  TSH: 0.96      RADIOLOGY/EKG:      ECHO/CARDIAC CATHTERIZATION/STRESS TEST:               HPI:  69 y/o male with PMH of HFpEF, parox atrial fibrillation, Watchman Implant, MR , COPD on 4 L of home O2,  ETOH induced cirrhosis,  HTN, obesity, VIJAY (noncompliant w/ CPAP), alcoholism, pulmonary HTN,  Left BKA  presents to  with 1 day history of difficulty breathing, productive cough, wheezing.  Patient states that he has had worsening shortness of breath since yesterday and this evening he had much worsening of symptoms.  Brought in by EMS who stated he was very short of breath and oxygen was in the high 80s/low 90s upon arrival.  Improved with supplemental oxygen and albuterol nebulizer treatment. Patient also reports episode of rectal bleeding episode at home.     in ED - [facility]  T  Max: 37.1, T(F) Max: 98.7 , HR: 106  (106 - 110), BP: 128/66 (124/56 - 128/66) RR: 16  (16 - 30) SpO2: 97%  (86% - 100%) EKG  A fib 112, Lactate 2.2--> 1.4, Mg 1.3, troponin 7.5 , , venous blood gas Ph 7.34 Parainfluenza positive, CXR - mild pulmonary edema, s/p , tylenol, duoneb, lasix 80 s/p BIPAP   23: Pt feels improved.   23: Pt denies chest pain.  Still has shortness of breath.  Tele: afib rate controlled.            PAST MEDICAL & SURGICAL HISTORY:  Chronic atrial fibrillation      Alcohol abuse      Poor historian      CHF (congestive heart failure)      Cirrhosis      Emphysema of lung      Alcohol withdrawal      Collapsed lung      Meningitis      Falls      Anemia      Chronic obstructive pulmonary disease (COPD)      GERD (gastroesophageal reflux disease)      Hypertension      Presence of Watchman left atrial appendage closure device      Atrial fibrillation      COPD without exacerbation      Chronic CHF      S/P BKA (below knee amputation) unilateral, left      Presence of Watchman left atrial appendage closure device      Unilateral amputation of lower extremity below knee      H/O tracheostomy  now removed      S/P percutaneous endoscopic gastrostomy (PEG) tube placement  now removed          SOCIAL HISTORY:    FAMILY HISTORY:  No pertinent family history in first degree relatives        ALLERGIES:  Allergies    Duricef (Rash)  Ceclor (Rash)    Intolerances      MEDICATIONS  (STANDING):  albuterol/ipratropium for Nebulization 3 milliLiter(s) Nebulizer every 6 hours  azithromycin  IVPB      azithromycin  IVPB 500 milliGRAM(s) IV Intermittent every 24 hours  bisacodyl 5 milliGRAM(s) Oral every 12 hours  buDESOnide    Inhalation Suspension 0.5 milliGRAM(s) Inhalation every 12 hours  busPIRone 10 milliGRAM(s) Oral two times a day  cholecalciferol 2000 Unit(s) Oral at bedtime  cyanocobalamin 1000 MICROGram(s) Oral at bedtime  diltiazem    milliGRAM(s) Oral daily  doxazosin 1 milliGRAM(s) Oral at bedtime  enoxaparin Injectable 40 milliGRAM(s) SubCutaneous every 24 hours  furosemide   Injectable 40 milliGRAM(s) IV Push two times a day  gabapentin 400 milliGRAM(s) Oral three times a day  hemorrhoidal Ointment 1 Application(s) Rectal two times a day  melatonin 5 milliGRAM(s) Oral at bedtime  methylPREDNISolone sodium succinate Injectable 40 milliGRAM(s) IV Push every 12 hours  metoprolol tartrate 25 milliGRAM(s) Oral two times a day  pantoprazole    Tablet 40 milliGRAM(s) Oral before breakfast  QUEtiapine 100 milliGRAM(s) Oral at bedtime  spironolactone 25 milliGRAM(s) Oral daily  thiamine 100 milliGRAM(s) Oral at bedtime    MEDICATIONS  (PRN):  acetaminophen     Tablet .. 650 milliGRAM(s) Oral every 6 hours PRN Temp greater or equal to 38C (100.4F), Mild Pain (1 - 3)  aluminum hydroxide/magnesium hydroxide/simethicone Suspension 30 milliLiter(s) Oral every 4 hours PRN Dyspepsia  metoprolol tartrate Injectable 5 milliGRAM(s) IV Push every 6 hours PRN HR>130  ondansetron Injectable 4 milliGRAM(s) IV Push every 8 hours PRN Nausea and/or Vomiting      Vital Signs Last 24 Hrs  T(C): 36.4 (30 May 2023 08:08), Max: 36.4 (30 May 2023 08:08)  T(F): 97.6 (30 May 2023 08:08), Max: 97.6 (30 May 2023 08:08)  HR: 92 (30 May 2023 08:08) (88 - 100)  BP: 121/56 (30 May 2023 08:08) (115/66 - 141/68)  BP(mean): --  RR: 20 (30 May 2023 08:08) (20 - 20)  SpO2: 93% (30 May 2023 08:08) (92% - 97%)    Parameters below as of 30 May 2023 08:08  Patient On (Oxygen Delivery Method): nasal cannula    Daily     Daily Weight in k.5 (30 May 2023 06:00)  Daily     Daily Weight in k.4 (29 May 2023 07:20)I&O's Summary      PHYSICAL EXAM:    Constitutional: NAD, awake and alert, well-developed  HEENT: PERR, EOMI,  No oral cyananosis.  Neck:  supple,  No JVD  Respiratory: exp wheezing noted   Cardiovascular: S1 and S2,irreg rhythm, no Murmurs, gallops or rubs  Gastrointestinal: Bowel Sounds present, soft, nontender.   Extremities: L BKA, right no peripheral edema. No clubbing or cyanosis,  2+ peripheral pulses  Neurological: A/O x 3, no focal deficits  Musculoskeletal: no calf tenderness.  Skin: No rashes.      LABS: All Labs Reviewed:                          11.9   10.63 )-----------( 153      ( 30 May 2023 07:13 )             36.6                           11.6   5.66  )-----------( 138      ( 29 May 2023 08:36 )             36.3                         12.1   5.55  )-----------( 149      ( 28 May 2023 06:51 )             38.5                         11.7   5.48  )-----------( 129      ( 27 May 2023 09:22 )             36.1     30    136  |  93<L>  |  22  ----------------------------<  158<H>  4.1   |  41<H>  |  0.75    Ca    9.1      30 May 2023 07:13  Phos  3.3     -  Mg     2.1         TPro  7.3  /  Alb  3.0<L>  /  TBili  0.4  /  DBili  0.1  /  AST  17  /  ALT  19  /  AlkPhos  80  30      29 May 2023 08:36    x      |  x      |  18     ----------------------------<  x      x       |  x      |  x      28 May 2023 06:51    135    |  93     |  12     ----------------------------<  100    4.1     |  38     |  0.80   27 May 2023 09:22    137    |  96     |  11     ----------------------------<  109    3.5     |  37     |  0.93     Ca    8.7        28 May 2023 06:51  Ca    8.4        27 May 2023 09:22  Ca    8.4        27 May 2023 01:23  Phos  3.3       29 May 2023 08:36  Phos  3.4       28 May 2023 06:51  Phos  3.9       27 May 2023 09:22  Mg     2.1       29 May 2023 08:36  Mg     2.0       28 May 2023 06:51  Mg     1.4       27 May 2023 09:22    TPro  7.2    /  Alb  2.9    /  TBili  0.5    /  DBili  x      /  AST  25     /  ALT  19     /  AlkPhos  92     28 May 2023 06:51  TPro  7.1    /  Alb  3.0    /  TBili  0.5    /  DBili  x      /  AST  15     /  ALT  18     /  AlkPhos  95     27 May 2023 09:22  TPro  7.2    /  Alb  2.9    /  TBili  0.4    /  DBili  x      /  AST  21     /  ALT  22     /  AlkPhos  97     27 May 2023 01:23      CARDIAC MARKERS ( 29 May 2023 08:36 )  x     / x     / 65 U/L / x     / x          Blood Culture: Organism --  Gram Stain Blood -- Gram Stain --  Specimen Source Clean Catch None  Culture-Blood --    Organism --  Gram Stain Blood -- Gram Stain --  Specimen Source .Blood Blood-Peripheral  Culture-Blood --         @ 09:22  TSH: 0.96      RADIOLOGY/EKG:

## 2023-05-30 NOTE — PROGRESS NOTE ADULT - PROBLEM SELECTOR PLAN 1
Pt without complaints of chest pain.  Continues to have shortness ob breath which is likely multifactorial (HFpEF, parainfluenza, VIJAY, obesity, COPD).  Weight is up.  BNP trending up.  Continue IV lasix.  Continue GDMT with BB/MRA, daily weight, fluid restriction 1.5L/day,

## 2023-05-30 NOTE — PROGRESS NOTE ADULT - SUBJECTIVE AND OBJECTIVE BOX
Chief Complaint: Dyspnea, cough, congestion    Interval Hx: Per patient, he used BiPAP overnight but only for a couple of hours. He reports continued dyspnea today, but this is somewhat improved since admission. He describes having less cough and less congestion now. No other acute complaints. No fevers or rigors. Oxygenation stable. On same O2 L/min as at home. HR improved, 80s-90s down from 100s-110s.    ROS: Multi system review is comprehensively negative x 10 systems except as above    Vitals:  T(F): 98.1 (30 May 2023 16:07), Max: 98.1 (30 May 2023 16:07)  HR: 91 (30 May 2023 16:07) (88 - 100)  BP: 115/61 (30 May 2023 16:07) (115/61 - 141/68)  RR: 18 (30 May 2023 16:07) (18 - 20)  SpO2: 97% (30 May 2023 16:07) (93% - 97%) on 4L/min via NC    Exam:   GEN: No acute distress  HEENT: NCAT PERRL EOMI MMM clear oropharynx  NECK: Supple  CHEST: Normal resp effort, diminished breath sound B/L, + wheeze B/L  HEART: S1 S2 normal, regular, borderline tachycardic with HR 90  ABD: Soft, non-tender, non-distended, +BS  EXT: L BKA, RLE without edema or calf tenderness.   SKIN: Warm dry, no rash  MOOD: Calm, pleasant  NEURO: A+OX3, no gross deficits    Labs:                                11.9   10.63 )--------( 153              36.6       136  |  93  |  22  ---------------------<  158  4.1   |  41  |  0.75    Ca 9.1  Phos 3.3  Mg 2.1    TPro  7.3  /  Alb  3.0  /  TBili  0.4  /  DBili  0.1  /  AST  17  /  ALT  19  /  AlkPhos  80      ABG : pH 7.31  /  pCO2: 79   /  pO2: 101  /  HCO3: 40   /  Base Excess: 10.2  /  SaO2: 99        Urinalysis   Color: Yellow / Appearance: Clear / S.015 / pH: x  Gluc: x / Ketone: Negative  / Bili: Negative / Urobili: Negative   Blood: x / Protein: Negative / Nitrite: Negative   Leuk Esterase: Negative / RBC: 11-25 /HPF / WBC 0-2 /HPF   Sq Epi: x / Non Sq Epi: x / Bacteria: Occasional    Micro:  Urine culture : Negative  Blood culture : Negative    Imaging:  XR ankle R : no fracture, dislocation, soft tissue swelling or joint effusion. Degenerative changes at the ankle joint. Small plantar and retrocalcaneal spurs. Degenerative changes of the talonavicular joint. No radiopaque foreign body.    US Duplex Venous Lower Ext Complete, Bilateral : No evidence of deep venous thrombosis in either lower extremity.    CT Abdomen and Pelvis WO : Cirrhotic liver with mild splenomegaly. No ascites. Mildly enlarged 3.1cm splenic hypodense low-attenuation lesion of unclear etiology, possibly a cyst. Consider abdominal MRI for further evaluation of these findings. No bowel distention seen.    CT Chest WO : 1. New 9 mm left upper lobe nodular opacity and nonspecific dependent groundglass may be infectious or inflammatory. Additional peripheral reticular opacities likely reflect component of chronic interstitial lung disease. Slightly enlarged 5 mm right lower lobe pulmonary nodule since , of unclear significance. Follow-up chest CT is recommended in 6 weeks to evaluate for stability versus resolution of these findings. Cardiac enlargement, multivessel coronary artery calcification, and Watchman device noted. Significantly enlarged main pulmonary artery likely reflects pulmonary arterial hypertension.    Cardiac Testing:  EKG : Afib with RVR    Meds:  MEDICATIONS  (STANDING):  albuterol/ipratropium for Nebulization 3 milliLiter(s) Nebulizer every 6 hours  azithromycin  IVPB      azithromycin  IVPB 500 milliGRAM(s) IV Intermittent every 24 hours  bisacodyl 5 milliGRAM(s) Oral every 12 hours  buDESOnide    Inhalation Suspension 0.5 milliGRAM(s) Inhalation every 12 hours  busPIRone 10 milliGRAM(s) Oral two times a day  cholecalciferol 2000 Unit(s) Oral at bedtime  cyanocobalamin 1000 MICROGram(s) Oral at bedtime  diltiazem    milliGRAM(s) Oral daily  doxazosin 1 milliGRAM(s) Oral at bedtime  enoxaparin Injectable 40 milliGRAM(s) SubCutaneous every 24 hours  furosemide   Injectable 40 milliGRAM(s) IV Push two times a day  gabapentin 400 milliGRAM(s) Oral three times a day  hemorrhoidal Ointment 1 Application(s) Rectal two times a day  melatonin 5 milliGRAM(s) Oral at bedtime  methylPREDNISolone sodium succinate Injectable 40 milliGRAM(s) IV Push every 12 hours  metoprolol tartrate 25 milliGRAM(s) Oral two times a day  pantoprazole    Tablet 40 milliGRAM(s) Oral before breakfast  QUEtiapine 100 milliGRAM(s) Oral at bedtime  spironolactone 25 milliGRAM(s) Oral daily  thiamine 100 milliGRAM(s) Oral at bedtime    MEDICATIONS  (PRN):  acetaminophen     Tablet .. 650 milliGRAM(s) Oral every 6 hours PRN Temp greater or equal to 38C (100.4F), Mild Pain (1 - 3)  aluminum hydroxide/magnesium hydroxide/simethicone Suspension 30 milliLiter(s) Oral every 4 hours PRN Dyspepsia  metoprolol tartrate Injectable 5 milliGRAM(s) IV Push every 6 hours PRN HR>130  ondansetron Injectable 4 milliGRAM(s) IV Push every 8 hours PRN Nausea and/or Vomiting     Chief Complaint: Dyspnea, cough, congestion    Interval Hx: Per patient, he used BiPAP overnight but only for a couple of hours. He reports continued dyspnea today, but this is somewhat improved since admission. He describes having less cough and less congestion now. No other acute complaints. No fevers or rigors. Oxygenation stable. On same O2 L/min as at home. HR improved, 80s-90s down from 100s-110s.    ROS: Multi system review is comprehensively negative x 10 systems except as above    Vitals:  T(F): 98.1 (30 May 2023 16:07), Max: 98.1 (30 May 2023 16:07)  HR: 91 (30 May 2023 16:07) (88 - 100)  BP: 115/61 (30 May 2023 16:07) (115/61 - 141/68)  RR: 18 (30 May 2023 16:07) (18 - 20)  SpO2: 97% (30 May 2023 16:07) (93% - 97%) on 4L/min via NC    Exam:   GEN: No acute distress  HEENT: NCAT PERRL EOMI MMM clear oropharynx  NECK: Supple  CHEST: Normal resp effort, diminished breath sound B/L, + wheeze B/L  HEART: S1 S2 normal, regular, borderline tachycardic with HR 90  ABD: Soft, non-tender, non-distended, +BS  EXT: L BKA, RLE without edema or calf tenderness.   SKIN: Warm dry, no rash  MOOD: Calm, pleasant  NEURO: A+OX3, no gross deficits    Labs:                                11.9   10.63 )--------( 153              36.6       136  |  93  |  22  ---------------------<  158  4.1   |  41  |  0.75    Ca 9.1  Phos 3.3  Mg 2.1    TPro  7.3  /  Alb  3.0  /  TBili  0.4  /  DBili  0.1  /  AST  17  /  ALT  19  /  AlkPhos  80      Troponin negative   proBNP 1134    ABG : pH 7.31  /  pCO2: 79   /  pO2: 101  /  HCO3: 40   /  Base Excess: 10.2  /  SaO2: 99        Urinalysis   Color: Yellow / Appearance: Clear / S.015 / pH: x  Gluc: x / Ketone: Negative  / Bili: Negative / Urobili: Negative   Blood: x / Protein: Negative / Nitrite: Negative   Leuk Esterase: Negative / RBC: 11-25 /HPF / WBC 0-2 /HPF   Sq Epi: x / Non Sq Epi: x / Bacteria: Occasional    Micro:  Urine culture : Negative  Blood culture : Negative    Imaging:  XR ankle R : no fracture, dislocation, soft tissue swelling or joint effusion. Degenerative changes at the ankle joint. Small plantar and retrocalcaneal spurs. Degenerative changes of the talonavicular joint. No radiopaque foreign body.    US Duplex Venous Lower Ext Complete, Bilateral : No evidence of deep venous thrombosis in either lower extremity.    CT Abdomen and Pelvis WO : Cirrhotic liver with mild splenomegaly. No ascites. Mildly enlarged 3.1cm splenic hypodense low-attenuation lesion of unclear etiology, possibly a cyst. Consider abdominal MRI for further evaluation of these findings. No bowel distention seen.    CT Chest WO : 1. New 9 mm left upper lobe nodular opacity and nonspecific dependent groundglass may be infectious or inflammatory. Additional peripheral reticular opacities likely reflect component of chronic interstitial lung disease. Slightly enlarged 5 mm right lower lobe pulmonary nodule since , of unclear significance. Follow-up chest CT is recommended in 6 weeks to evaluate for stability versus resolution of these findings. Cardiac enlargement, multivessel coronary artery calcification, and Watchman device noted. Significantly enlarged main pulmonary artery likely reflects pulmonary arterial hypertension.    Cardiac Testing:  EKG : Afib with RVR    Meds:  MEDICATIONS  (STANDING):  albuterol/ipratropium for Nebulization 3 milliLiter(s) Nebulizer every 6 hours  azithromycin  IVPB      azithromycin  IVPB 500 milliGRAM(s) IV Intermittent every 24 hours  bisacodyl 5 milliGRAM(s) Oral every 12 hours  buDESOnide    Inhalation Suspension 0.5 milliGRAM(s) Inhalation every 12 hours  busPIRone 10 milliGRAM(s) Oral two times a day  cholecalciferol 2000 Unit(s) Oral at bedtime  cyanocobalamin 1000 MICROGram(s) Oral at bedtime  diltiazem    milliGRAM(s) Oral daily  doxazosin 1 milliGRAM(s) Oral at bedtime  enoxaparin Injectable 40 milliGRAM(s) SubCutaneous every 24 hours  furosemide   Injectable 40 milliGRAM(s) IV Push two times a day  gabapentin 400 milliGRAM(s) Oral three times a day  hemorrhoidal Ointment 1 Application(s) Rectal two times a day  melatonin 5 milliGRAM(s) Oral at bedtime  methylPREDNISolone sodium succinate Injectable 40 milliGRAM(s) IV Push every 12 hours  metoprolol tartrate 25 milliGRAM(s) Oral two times a day  pantoprazole    Tablet 40 milliGRAM(s) Oral before breakfast  QUEtiapine 100 milliGRAM(s) Oral at bedtime  spironolactone 25 milliGRAM(s) Oral daily  thiamine 100 milliGRAM(s) Oral at bedtime    MEDICATIONS  (PRN):  acetaminophen     Tablet .. 650 milliGRAM(s) Oral every 6 hours PRN Temp greater or equal to 38C (100.4F), Mild Pain (1 - 3)  aluminum hydroxide/magnesium hydroxide/simethicone Suspension 30 milliLiter(s) Oral every 4 hours PRN Dyspepsia  metoprolol tartrate Injectable 5 milliGRAM(s) IV Push every 6 hours PRN HR>130  ondansetron Injectable 4 milliGRAM(s) IV Push every 8 hours PRN Nausea and/or Vomiting

## 2023-05-30 NOTE — PROGRESS NOTE ADULT - SUBJECTIVE AND OBJECTIVE BOX
Subjective:  awake and alert  no distress    MEDICATIONS  (STANDING):  albuterol/ipratropium for Nebulization 3 milliLiter(s) Nebulizer every 6 hours  azithromycin  IVPB 500 milliGRAM(s) IV Intermittent every 24 hours  azithromycin  IVPB      bisacodyl 5 milliGRAM(s) Oral every 12 hours  buDESOnide    Inhalation Suspension 0.5 milliGRAM(s) Inhalation every 12 hours  busPIRone 10 milliGRAM(s) Oral two times a day  cholecalciferol 2000 Unit(s) Oral at bedtime  cyanocobalamin 1000 MICROGram(s) Oral at bedtime  diltiazem    milliGRAM(s) Oral daily  doxazosin 1 milliGRAM(s) Oral at bedtime  enoxaparin Injectable 40 milliGRAM(s) SubCutaneous every 24 hours  furosemide   Injectable 40 milliGRAM(s) IV Push two times a day  gabapentin 400 milliGRAM(s) Oral three times a day  hemorrhoidal Ointment 1 Application(s) Rectal two times a day  melatonin 5 milliGRAM(s) Oral at bedtime  methylPREDNISolone sodium succinate Injectable 40 milliGRAM(s) IV Push every 8 hours  metoprolol tartrate 25 milliGRAM(s) Oral two times a day  pantoprazole    Tablet 40 milliGRAM(s) Oral before breakfast  QUEtiapine 100 milliGRAM(s) Oral at bedtime  spironolactone 25 milliGRAM(s) Oral daily  thiamine 100 milliGRAM(s) Oral at bedtime    MEDICATIONS  (PRN):  acetaminophen     Tablet .. 650 milliGRAM(s) Oral every 6 hours PRN Temp greater or equal to 38C (100.4F), Mild Pain (1 - 3)  aluminum hydroxide/magnesium hydroxide/simethicone Suspension 30 milliLiter(s) Oral every 4 hours PRN Dyspepsia  metoprolol tartrate Injectable 5 milliGRAM(s) IV Push every 6 hours PRN HR>130  ondansetron Injectable 4 milliGRAM(s) IV Push every 8 hours PRN Nausea and/or Vomiting      Allergies    Duricef (Rash)  Ceclor (Rash)    Intolerances        REVIEW OF SYSTEMS: as per previous        Vital Signs Last 24 Hrs  T(C): 36.3 (29 May 2023 20:30), Max: 36.3 (29 May 2023 20:30)  T(F): 97.3 (29 May 2023 20:30), Max: 97.3 (29 May 2023 20:30)  HR: 96 (30 May 2023 08:08) (88 - 100)  BP: 115/66 (30 May 2023 06:00) (115/66 - 141/68)  BP(mean): --  RR: 20 (29 May 2023 20:30) (20 - 20)  SpO2: 96% (29 May 2023 21:05) (92% - 97%)    Parameters below as of 30 May 2023 08:08  Patient On (Oxygen Delivery Method): nasal cannula, 5lpm        PHYSICAL EXAMINATION:  SKIN: no rashes  HEAD: NC/AT  EYES: PERRLA, EOMI  NECK:  Supple. No lymphadenopathy. Jugular venous pressure not elevated. Carotids equal.   HEART:  S1S2 irreg  CHEST:  decreased bs bases; scattered ronchi  ABDOMEN:  Soft and nontender. obese  EXTREMITIES:  left bka  NEURO: AAO X 3      LABS:                        11.9   10.63 )-----------( 153      ( 30 May 2023 07:13 )             36.6     05-29    x   |  x   |  18  ----------------------------<  x   x    |  x   |  x     Phos  3.3     05-29  Mg     2.1     05-29            RADIOLOGY & ADDITIONAL TESTS:

## 2023-05-30 NOTE — PROGRESS NOTE ADULT - SUBJECTIVE AND OBJECTIVE BOX
Patient is a 62y old  Male who presents with a chief complaint of Shortness of breath (30 Oct 2017 15:03)      BRIEF HOSPITAL COURSE: 61 yo male, PMHx COPD, AFib not on AC, CHF, alcoholic cirrhosis, EtOH abuse, EtOH withdrawal (intubated in past for airway protection), initially admitted on 10/28 for COPD exacerbation and alcohol intoxication, signed out AMA and was found later in hospital disoriented. Readmitted and RRT called for development of acute EtOH withdrawal. Transferred to MICU for further management of DTs. Patient subsequently became obtunded, lost ability to protect airway, and was intubated. CT head neg for acute events. Hospital course complicated by AFib with RVR, sepsis, Pseudomonas PNA, aspiration pneumonia. Self extubated on 11/2 without need for reintubation initially, but on the morning of 11/4 patient became obtunded with difficulty managing secretions requiring reintubation. Pt has had recurrent ruptured  balloons and ETT breakage, with multiple failed weaning trials. S/p tracheostomy 11/9 and PEG 11/22. Readmitted to the ICU secondary to for fever r/o sepsis and recurrent AFib with RVR.    Events last 24 hours: Afebrile. HR improved overnight, no further episodes of RVR s/p dig load yesterday. Had episode of hypotension today, BP stable.    PAST MEDICAL & SURGICAL HISTORY:  Falls  Meningitis  Collapsed lung  Alcohol withdrawal  Emphysema of lung  ETOH abuse  Cirrhosis  Afib  CHF (congestive heart failure)  Poor historian  Alcohol abuse  Chronic atrial fibrillation  S/P BKA (below knee amputation), left: secondary to worsening infection in lower leg. Had both ankle injuries from fall s/p repair - left had complication.  S/P BKA (below knee amputation) unilateral, left      Review of Systems:  CONSTITUTIONAL: No fever, chills, or fatigue  EYES: No eye pain, visual disturbances, or discharge  ENMT:  No difficulty hearing, tinnitus, vertigo; No sinus or throat pain  NECK: No pain or stiffness  RESPIRATORY: No cough, wheezing, chills or hemoptysis; No shortness of breath  CARDIOVASCULAR: No chest pain, palpitations, dizziness, or leg swelling  GASTROINTESTINAL: No abdominal or epigastric pain. No nausea, vomiting, or hematemesis; No diarrhea or constipation. No melena or hematochezia.  GENITOURINARY: No dysuria, frequency, hematuria, or incontinence  NEUROLOGICAL: No headaches, memory loss, loss of strength, numbness, or tremors  SKIN: No itching, burning, rashes, or lesions   MUSCULOSKELETAL: No joint pain or swelling; No muscle, back, or extremity pain  PSYCHIATRIC: No depression, anxiety, mood swings, or difficulty sleeping      Medications:    digoxin     Tablet 0.125 milliGRAM(s) Oral daily  diltiazem    Tablet 60 milliGRAM(s) Oral every 6 hours  lisinopril 5 milliGRAM(s) Oral daily  metoprolol     tartrate 25 milliGRAM(s) Oral every 8 hours  metoprolol    tartrate Injectable 5 milliGRAM(s) IV Push every 6 hours PRN  tamsulosin 0.4 milliGRAM(s) Oral at bedtime    ALBUTerol/ipratropium for Nebulization 3 milliLiter(s) Nebulizer every 6 hours    acetaminophen    Suspension 650 milliGRAM(s) Oral every 6 hours PRN  fentaNYL    Injectable 25 MICROGram(s) IV Push every 4 hours PRN  haloperidol    Injectable 2 milliGRAM(s) IV Push every 6 hours PRN  OLANZapine 2.5 milliGRAM(s) Oral daily  OLANZapine 5 milliGRAM(s) Oral at bedtime      enoxaparin Injectable 40 milliGRAM(s) SubCutaneous every 24 hours    bisacodyl Suppository 10 milliGRAM(s) Rectal daily PRN  lactulose Syrup 10 Gram(s) Oral daily  pantoprazole  Injectable 40 milliGRAM(s) IV Push daily        folic acid 1 milliGRAM(s) Oral daily  multivitamin 1 Tablet(s) Oral daily    influenza   Vaccine 0.5 milliLiter(s) IntraMuscular once    chlorhexidine 0.12% Liquid 15 milliLiter(s) Swish and Spit two times a day    nicotine - 21 mG/24Hr(s) Patch 1 patch Transdermal daily      Mode: AC/ CMV (Assist Control/ Continuous Mandatory Ventilation)  RR (machine): 14  TV (machine): 480  FiO2: 30  PEEP: 5  MAP: 9  PIP: 25      ICU Vital Signs Last 24 Hrs  T(C): 37.1 (26 Nov 2017 00:00), Max: 37.7 (25 Nov 2017 16:24)  T(F): 98.8 (26 Nov 2017 00:00), Max: 99.8 (25 Nov 2017 16:24)  HR: 85 (26 Nov 2017 03:34) (58 - 101)  BP: 127/75 (26 Nov 2017 03:00) (69/36 - 127/75)  BP(mean): 87 (26 Nov 2017 03:00) (48 - 91)  ABP: --  ABP(mean): --  RR: 22 (26 Nov 2017 03:00) (12 - 38)  SpO2: 97% (26 Nov 2017 03:40) (96% - 100%)          I&O's Detail    24 Nov 2017 07:01  -  25 Nov 2017 07:00  --------------------------------------------------------  IN:    Enteral Tube Flush: 200 mL    Free Water: 1000 mL    Glucerna: 1440 mL  Total IN: 2640 mL    OUT:    Incontinent per Condom Catheter: 1950 mL  Total OUT: 1950 mL    Total NET: 690 mL      25 Nov 2017 07:01  -  26 Nov 2017 04:10  --------------------------------------------------------  IN:    Enteral Tube Flush: 60 mL    Free Water: 720 mL    Glucerna: 1080 mL    Solution: 1000 mL  Total IN: 2860 mL    OUT:    Incontinent per Condom Catheter: 1450 mL  Total OUT: 1450 mL    Total NET: 1410 mL            LABS:                        10.0   7.7   )-----------( 261      ( 25 Nov 2017 06:10 )             31.6     11-25    142  |  105  |  17.0  ----------------------------<  112  4.5   |  26.0  |  0.47<L>    Ca    9.0      25 Nov 2017 06:10  Phos  4.1     11-25  Mg     2.0     11-25    CAPILLARY BLOOD GLUCOSE    CULTURES:      Physical Examination:    General: No acute distress.      HEENT: Pupils 3mm equal, sluggish reactive to light. Symmetric.    PULM: Trach to vent. Diminished to auscultation bilaterally, no respiratory distress    CVS: AFib regular rate, no murmurs, rubs, or gallops    ABD: Soft, nondistended, nontender, normoactive bowel sounds, no masses. PEG in place.    EXT: L BKA. No edema, nontender    SKIN: Warm and well perfused, no rashes noted.    NEURO: RASS 0, awake and alert, attempts to communicate verbally    RADIOLOGY: none recent    CRITICAL CARE TIME SPENT: I have spent 30 minutes of critical care time evaluating and treating this patient, including reviewing charts, labs, imagining studies, and collaborating with interdisciplinary team. 30-May-2023

## 2023-05-30 NOTE — PROGRESS NOTE ADULT - ASSESSMENT
67 y/o man with obesity, VIJAY non compliant with CPAP, HTN, HFpEF, permanent afib s/p Watchman, mitral regurgitation, peripheral vascular disease s/p L BKA, COPD, pHTN, chronic resp failure on home O2 4L/min, alcoholic cirrhosis, presented with difficulty breathing, productive cough, wheezing.  In the ED, he was afebrile, BP WNL, HR 100s-110s, SPO2 97% on 4L. Labs with negative troponin, , lactate 2.2. EKG afib RVR. CXR with mild pulm edema. RVP PCR panel (+) for parainfluenza infection. He was given aspirin, acetaminophen, Lasix IV and Duoneb, placed on NIPPV, subsequently improved to a modest degree and was admitted to Medicine thereafter.     Acute on chronic hypoxic and hypercapneic respiratory failure  Multifactorial due to parainfluenza infection, unable to rule out viral pneumonia, present upon admission, VIJAY nonadherent to NIPPV, COPD with exacerbation, pulmonary HTN, and possibly acute on chronic diastolic CHF.   - Continue to treat underlying issues  - O2 supplementation (on home O2)  - NIPPV with BIPAP at night    Parainfluenza infection, unable to rule out viral pneumonia, present upon admission  Still with dyspnea and cough but gradually improving  - Continue supportive care measures    VIJAY  Stable  - Continue to encourage NIPPV use    COPD with exacerbation  Wheeze on exam. No resp distress.   - Continue Solumedrol, budesonide neb q12, Duoneb q6, azithromycin    Acute on chronic diastolic CHF  No angina. Troponin negative. Initial EKG afib RVR which may have contributed to the CHF exacerbation.  - Continue IV Lasix 40mg BID, spironolactone 25mg daily  - Continue metoprolol   - IS and OS, daily wt  - Trend lytes and Cr    Permanent afib, RVR  HR suboptimally controlled in setting of parainfluenza infection. On metoprolol and diltizem CD. HR now somewhat improved, 80s-90s.  - Continue metoprolol and diltiazem CD  - Not on AC as patient has hx of Watchman placement    Pulmonary nodules in MICHELLE and RLL  As noted on CT  - Continued monitoring as outpatient    Alcoholic cirrhosis  No encephalopathy. No ascites. Unknown as to presence of varices.   - Continue diuretics    Normocytic anemia  Did have an episode of rectal bleeding prior to admission, suspected to be hemorrhoidal. He does have hx of R ischemic colitis in past and underlying cirrhosis / alcohol use disorder that could also be responsible for his chronic anemia. No overt bleeding at this time.  - Continue to monitor,   - Per Dr Barker, outpatient EGD and colonoscopy once respiratory status improving     Hypomagnesemia  Improved with supplementation    Vit D deficiency  25-OH vit D 17 ng/mL.    - Ordered for Vit D replacement      DVT px: LMWH  Code status: Full  Emergency contact: DaughterDorota [phone]   Dispo: To be determined pending further clinical assessment     67 y/o man with obesity, VIJAY non compliant with CPAP, HTN, HFpEF, permanent afib s/p Watchman, mitral regurgitation, peripheral vascular disease s/p L BKA, COPD, pHTN, chronic resp failure on home O2 4L/min, alcoholic cirrhosis, presented with difficulty breathing, productive cough, wheezing.  In the ED, he was afebrile, BP WNL, HR 100s-110s, SPO2 97% on 4L. Labs with negative troponin, , lactate 2.2. EKG afib RVR. CXR with mild pulm edema. RVP PCR panel (+) for parainfluenza infection. He was given aspirin, acetaminophen, Lasix IV and Duoneb, placed on NIPPV, subsequently improved to a modest degree and was admitted to Medicine thereafter.     Acute on chronic hypoxic and hypercapneic respiratory failure  Multifactorial due to parainfluenza infection, unable to rule out viral pneumonia, present upon admission, VIJAY nonadherent to NIPPV, COPD with exacerbation, pulmonary HTN, and possibly acute on chronic diastolic CHF.   - Continue to treat underlying issues  - O2 supplementation (on home O2)  - NIPPV with BIPAP at night    Parainfluenza infection, unable to rule out viral pneumonia, present upon admission  Still with dyspnea and cough but gradually improving  - Continue supportive care measures    VIJAY  Stable  - Continue to encourage NIPPV use    COPD with exacerbation  Wheeze on exam. No resp distress.   - Continue Solumedrol, budesonide neb q12, Duoneb q6, azithromycin    Acute on chronic diastolic CHF  No angina. Troponin negative. Initial EKG afib RVR which may have contributed to the CHF exacerbation.  - Continue IV Lasix 40mg BID, spironolactone 25mg daily  - Continue metoprolol   - IS and OS, daily wt  - Trend lytes and Cr    Permanent afib, RVR  HR suboptimally controlled in setting of parainfluenza infection. On metoprolol and diltizem CD. HR now somewhat improved, 80s-90s.  - Continue metoprolol and diltiazem CD  - Not on AC as patient has hx of Watchman placement    Pulmonary nodules in MICHELLE and RLL  As noted on CT  - Continued monitoring as outpatient    Alcoholic cirrhosis  No encephalopathy. No ascites. Unknown as to presence of varices.   - Continue diuretics    Normocytic anemia  Did have an episode of rectal bleeding prior to admission, suspected to be hemorrhoidal. He does have hx of R ischemic colitis in past and underlying cirrhosis / alcohol use disorder that could also be responsible for his chronic anemia. No overt bleeding at this time.  - Continue to monitor,   - Per Dr Barker, outpatient EGD and colonoscopy once respiratory status improving     Hypomagnesemia  Improved with supplementation    Vit D deficiency  25-OH vit D 17 ng/mL.    - Ordered for Vit D replacement      DVT px: LMWH  Code status: Full  Emergency contact: DaughterDorota [phone]   Dispo: Return to assisted living wit in-house PT, once clinically improved and inpatient workup is complete     69 y/o man with obesity, VIJAY non-adherent to CPAP, HTN, HFpEF, permanent afib s/p Watchman, mitral regurgitation, peripheral vascular disease s/p L BKA, COPD, pulmonary HTN, chronic resp failure on home O2 4L/min, alcoholic cirrhosis, presented with difficulty breathing, productive cough, wheezing. In the ED, he was afebrile, BP WNL, HR 100s-110s, SPO2 97% on 4L. Exam notable for increased resp effort, B/L wheeze. Labs with negative troponin, , lactate 2.2. EKG afib RVR. CXR with mild pulm edema. RVP PCR panel (+) for parainfluenza infection. He was given aspirin, acetaminophen, Lasix IV, Solumedrol and Duoneb, placed on NIPPV. He subsequently improved to a modest degree and was admitted to Medicine thereafter.     Acute on chronic hypoxic and hypercapneic respiratory failure  Multifactorial due to parainfluenza infection, unable to rule out viral pneumonia, present upon admission, VIJAY non-adherent to NIPPV, COPD with exacerbation, pulmonary HTN, and possibly acute on chronic diastolic CHF.   - Continue to treat underlying issues  - O2 supplementation (on home O2)  - NIPPV with BIPAP at night    Parainfluenza infection, unable to rule out viral pneumonia, present upon admission  Still with dyspnea and cough but gradually improving  - Continue supportive care measures    VIJAY  Stable  - Continue to encourage NIPPV use    COPD with exacerbation  Wheeze on exam. No resp distress.   - Continue Solumedrol, budesonide neb q12, Duoneb q6, azithromycin    Acute on chronic diastolic CHF  No angina. Troponin negative. ProBNP elevated to 1134. Initial EKG afib RVR which may have contributed to the CHF exacerbation.  - TTE ordered, pending  - Continue IV Lasix 40mg BID, spironolactone 25mg daily  - Continue metoprolol   - IS and OS, daily wt  - Trend lytes and Cr    Permanent afib, RVR  HR suboptimally controlled in setting of parainfluenza infection. On metoprolol and diltizem CD. HR now somewhat improved, 80s-90s.  - Continue metoprolol and diltiazem CD  - Not on AC as patient has hx of Watchman placement    Pulmonary nodules in MICHELLE and RLL  As noted on CT  - Continued monitoring as outpatient    Alcoholic cirrhosis  No encephalopathy. No ascites. Unknown as to presence of varices.   - Continue diuretics    Normocytic anemia  Did have an episode of rectal bleeding prior to admission, suspected to be hemorrhoidal. He does have hx of R ischemic colitis in past and underlying cirrhosis / alcohol use disorder that could also be responsible for his chronic anemia. No overt bleeding at this time.  - Continue to monitor,   - Per Dr Barker, outpatient EGD and colonoscopy once respiratory status improving     Hypomagnesemia  Improved with supplementation    Vit D deficiency  25-OH vit D 17 ng/mL.    - Ordered for Vit D replacement      DVT px: LMWH  Code status: Full  Emergency contact: DaughterDorota [phone]   Dispo: Return to assisted living wit in-house PT, once clinically improved and inpatient workup is complete

## 2023-05-30 NOTE — PROGRESS NOTE ADULT - ASSESSMENT
- patient now on 5L nasal canula; no distress  - BIPAP at nite  - on duoneb/budesonide aerosol  - decrease iv steroids  - noncompliant w BIPAP  - in afib w rate controlled  - on lovenox q24h; ? full dose AC

## 2023-05-31 ENCOUNTER — TRANSCRIPTION ENCOUNTER (OUTPATIENT)
Age: 68
End: 2023-05-31

## 2023-05-31 LAB
ANION GAP SERPL CALC-SCNC: 3 MMOL/L — LOW (ref 5–17)
BASE EXCESS BLDV CALC-SCNC: 17.4 MMOL/L — HIGH (ref -2–3)
BUN SERPL-MCNC: 27 MG/DL — HIGH (ref 7–23)
CALCIUM SERPL-MCNC: 9.1 MG/DL — SIGNIFICANT CHANGE UP (ref 8.5–10.1)
CHLORIDE SERPL-SCNC: 88 MMOL/L — LOW (ref 96–108)
CO2 SERPL-SCNC: 40 MMOL/L — HIGH (ref 22–31)
CREAT SERPL-MCNC: 0.89 MG/DL — SIGNIFICANT CHANGE UP (ref 0.5–1.3)
EGFR: 95 ML/MIN/1.73M2 — SIGNIFICANT CHANGE UP
GAS PNL BLDV: SIGNIFICANT CHANGE UP
GLUCOSE SERPL-MCNC: 142 MG/DL — HIGH (ref 70–99)
HCO3 BLDV-SCNC: 44 MMOL/L — HIGH (ref 22–29)
MAGNESIUM SERPL-MCNC: 2.1 MG/DL — SIGNIFICANT CHANGE UP (ref 1.6–2.6)
PCO2 BLDV: 61 MMHG — HIGH (ref 42–55)
PH BLDV: 7.47 — HIGH (ref 7.32–7.43)
PO2 BLDV: 100 MMHG — HIGH (ref 25–45)
POTASSIUM SERPL-MCNC: 4.3 MMOL/L — SIGNIFICANT CHANGE UP (ref 3.5–5.3)
POTASSIUM SERPL-SCNC: 4.3 MMOL/L — SIGNIFICANT CHANGE UP (ref 3.5–5.3)
SAO2 % BLDV: 98 % — HIGH (ref 67–88)
SODIUM SERPL-SCNC: 131 MMOL/L — LOW (ref 135–145)

## 2023-05-31 PROCEDURE — 99233 SBSQ HOSP IP/OBS HIGH 50: CPT

## 2023-05-31 RX ORDER — ASPIRIN/CALCIUM CARB/MAGNESIUM 324 MG
81 TABLET ORAL DAILY
Refills: 0 | Status: DISCONTINUED | OUTPATIENT
Start: 2023-05-31 | End: 2023-06-09

## 2023-05-31 RX ADMIN — ENOXAPARIN SODIUM 40 MILLIGRAM(S): 100 INJECTION SUBCUTANEOUS at 22:57

## 2023-05-31 RX ADMIN — Medication 1 MILLIGRAM(S): at 23:15

## 2023-05-31 RX ADMIN — Medication 40 MILLIGRAM(S): at 17:01

## 2023-05-31 RX ADMIN — Medication 3 MILLILITER(S): at 21:18

## 2023-05-31 RX ADMIN — Medication 40 MILLIGRAM(S): at 06:33

## 2023-05-31 RX ADMIN — Medication 81 MILLIGRAM(S): at 10:56

## 2023-05-31 RX ADMIN — QUETIAPINE FUMARATE 100 MILLIGRAM(S): 200 TABLET, FILM COATED ORAL at 23:14

## 2023-05-31 RX ADMIN — Medication 40 MILLIGRAM(S): at 14:09

## 2023-05-31 RX ADMIN — GABAPENTIN 400 MILLIGRAM(S): 400 CAPSULE ORAL at 14:08

## 2023-05-31 RX ADMIN — Medication 10 MILLIGRAM(S): at 10:55

## 2023-05-31 RX ADMIN — Medication 5 MILLIGRAM(S): at 23:14

## 2023-05-31 RX ADMIN — SPIRONOLACTONE 25 MILLIGRAM(S): 25 TABLET, FILM COATED ORAL at 10:55

## 2023-05-31 RX ADMIN — Medication 5 MILLIGRAM(S): at 23:13

## 2023-05-31 RX ADMIN — Medication 5 MILLIGRAM(S): at 10:55

## 2023-05-31 RX ADMIN — Medication 3 MILLILITER(S): at 08:45

## 2023-05-31 RX ADMIN — Medication 3 MILLILITER(S): at 03:09

## 2023-05-31 RX ADMIN — Medication 25 MILLIGRAM(S): at 23:13

## 2023-05-31 RX ADMIN — GABAPENTIN 400 MILLIGRAM(S): 400 CAPSULE ORAL at 06:30

## 2023-05-31 RX ADMIN — Medication 40 MILLIGRAM(S): at 06:30

## 2023-05-31 RX ADMIN — Medication 0.5 MILLIGRAM(S): at 08:47

## 2023-05-31 RX ADMIN — Medication 3 MILLILITER(S): at 13:44

## 2023-05-31 RX ADMIN — Medication 2000 UNIT(S): at 23:12

## 2023-05-31 RX ADMIN — Medication 100 MILLIGRAM(S): at 23:12

## 2023-05-31 RX ADMIN — GABAPENTIN 400 MILLIGRAM(S): 400 CAPSULE ORAL at 23:14

## 2023-05-31 RX ADMIN — Medication 10 MILLIGRAM(S): at 23:15

## 2023-05-31 RX ADMIN — Medication 25 MILLIGRAM(S): at 10:55

## 2023-05-31 RX ADMIN — Medication 180 MILLIGRAM(S): at 10:55

## 2023-05-31 RX ADMIN — PANTOPRAZOLE SODIUM 40 MILLIGRAM(S): 20 TABLET, DELAYED RELEASE ORAL at 06:30

## 2023-05-31 RX ADMIN — PREGABALIN 1000 MICROGRAM(S): 225 CAPSULE ORAL at 23:12

## 2023-05-31 RX ADMIN — Medication 0.5 MILLIGRAM(S): at 21:18

## 2023-05-31 NOTE — PROGRESS NOTE ADULT - ASSESSMENT
69 y/o man with obesity, VIJAY non-adherent to CPAP, HTN, HFpEF, permanent afib s/p Watchman, mitral regurgitation, peripheral vascular disease s/p L BKA, COPD, pulmonary HTN, chronic resp failure on home O2 4L/min, alcoholic cirrhosis, presented with difficulty breathing, productive cough, wheezing. In the ED, he was afebrile, BP WNL, HR 100s-110s, SPO2 97% on 4L. Exam notable for increased resp effort, B/L wheeze. Labs with negative troponin, , lactate 2.2. EKG afib RVR. CXR with mild pulm edema. RVP PCR panel (+) for parainfluenza infection. He was given aspirin, acetaminophen, Lasix IV, Solumedrol and Duoneb, placed on NIPPV. He subsequently improved to a modest degree and was admitted to Medicine thereafter.     #Acute on chronic hypoxic and hypercapneic respiratory failure  -Multifactorial due to parainfluenza infection, unable to rule out viral pneumonia, present upon admission, VIJAY non-adherent to NIPPV, COPD with exacerbation, pulmonary HTN, and possibly acute on chronic diastolic CHF.   - Continue to treat underlying issues  - O2 supplementation (on continuous home O2)  - NIPPV with BIPAP at night    #Parainfluenza infection, unable to rule out viral pneumonia, present upon admission  -Still with dyspnea and cough but gradually improving  - Continue supportive care measures    #VIJAY  -Stable  - Continue to encourage NIPPV use    #COPD with exacerbation  -Wheeze on exam. No resp distress.   - Continue Solumedrol, taper as able , budesonide neb q12, Duoneb q6  -azithro completed    #Acute on chronic diastolic CHF  -No angina. Troponin negative. ProBNP elevated to 1134. Initial EKG afib RVR which may have contributed to the CHF exacerbation.  - TTE done, report in paper chart  - Continue IV Lasix 40mg BID, spironolactone 25mg daily  -s/p dose metolazone 5/30  - Continue metoprolol   - f/u cards recs ([facility])    #Permanent afib, RVR  -HR suboptimally controlled in setting of parainfluenza infection. On metoprolol and diltizem CD. HR now somewhat improved, 80s-90s.  - Continue metoprolol and diltiazem CD  - Not on AC as patient has hx of Watchman placement  -ASA    #Pulmonary nodules in MICHELLE and RLL  -As noted on CT, repeat CT 6 wks    #Alcoholic cirrhosis  No encephalopathy. No ascites. Unknown as to presence of varices.   - Continue diuretics  -w splenic hypodensity, obtain nonemergent MRI to further eval as outpt    #Normocytic anemia  -Did have an episode of rectal bleeding prior to admission, suspected to be hemorrhoidal. He does have hx of R ischemic colitis in past and underlying cirrhosis / alcohol use disorder that could also be responsible for his chronic anemia. -No overt bleeding at this time.  - Continue to monitor,   - Per Dr Barker, outpatient EGD and colonoscopy once respiratory status improving     #Hypomagnesemia  -Improved with supplementation    #Vit D deficiency  -25-OH vit D 17 ng/mL.    - Ordered for Vit D replacement    #DVT ppx- SQL    DVT px: LMWH  Code status: Full    Updated daughter, [phone].  Pt medically active, on iv diuresis, iv steroids. D/c >48hrs

## 2023-05-31 NOTE — DISCHARGE NOTE NURSING/CASE MANAGEMENT/SOCIAL WORK - NSDCVIVACCINE_GEN_ALL_CORE_FT
influenza, injectable, quadrivalent, preservative free; 21-Nov-2019 14:53; Cathryn Burks (RN); [employer]; [id number] (Exp. Date: 30-Jun-2020); IntraMuscular; Deltoid Left.; 0.5 milliLiter(s); VIS (VIS Published: 15-Aug-2019, VIS Presented: 21-Nov-2019);   influenza, injectable, quadrivalent, preservative free; 17-Oct-2020 16:13; Bhavya Brown (RN); [employer]; [id number] (Exp. Date: 30-Jun-2021); IntraMuscular; Deltoid Left.; 0.5 milliLiter(s); VIS (VIS Published: 15-Aug-2019, VIS Presented: 17-Oct-2020);

## 2023-05-31 NOTE — DISCHARGE NOTE NURSING/CASE MANAGEMENT/SOCIAL WORK - PATIENT PORTAL LINK FT
You can access the FollowMyHealth Patient Portal offered by [facility] by registering at the following website: [email or web address]. By joining [facility]’s FollowMyHealth portal, you will also be able to view your health information using other applications (apps) compatible with our system.

## 2023-05-31 NOTE — PROGRESS NOTE ADULT - PROBLEM SELECTOR PLAN 1
Pt without complaints of chest pain.  Continues to have shortness ob breath which is likely multifactorial (HFpEF, parainfluenza, VIJAY, obesity, COPD).  Weight is up.  BNP trending up.  Continue IV lasix.  Continue GDMT with BB/MRA, daily weight, fluid restriction 1.5L/day, Pt without complaints of chest pain.  Continues to have shortness of breath which is likely multifactorial (HFpEF, parainfluenza, VIJAY, obesity, COPD).  Weight is up.  BNP trending up.  Continue IV lasix.  Continue GDMT with BB/MRA, daily weight, fluid restriction 1.5L/day,

## 2023-05-31 NOTE — PROGRESS NOTE ADULT - SUBJECTIVE AND OBJECTIVE BOX
HPI:  67 y/o male with PMH of HFpEF, parox atrial fibrillation, Watchman Implant, MR , COPD on 4 L of home O2,  ETOH induced cirrhosis,  HTN, obesity, VIJAY (noncompliant w/ CPAP), alcoholism, pulmonary HTN,  Left BKA  presents to  with 1 day history of difficulty breathing, productive cough, wheezing.  Patient states that he has had worsening shortness of breath since yesterday and this evening he had much worsening of symptoms.  Brought in by EMS who stated he was very short of breath and oxygen was in the high 80s/low 90s upon arrival.  Improved with supplemental oxygen and albuterol nebulizer treatment. Patient also reports episode of rectal bleeding episode at home.     in ED - Vital sings  T  Max: 37.1, T(F) Max: 98.7 , HR: 106  (106 - 110), BP: 128/66 (124/56 - 128/66) RR: 16  (16 - 30) SpO2: 97%  (86% - 100%) EKG  A fib 112, Lactate 2.2--> 1.4, Mg 1.3, troponin 7.5 , , venous blood gas Ph 7.34 Parainfluenza positive, CXR - mild pulmonary edema, s/p , tylenol, duoneb, lasix 80 s/p BIPAP   5/29/23: Pt feels improved.   5/30/23: Pt denies chest pain.  Still has shortness of breath.  Tele: afib rate controlled.  5/31/23: C/O SOB, Tele: Afib     MEDICATIONS  (STANDING):  albuterol/ipratropium for Nebulization 3 milliLiter(s) Nebulizer every 6 hours  aspirin  chewable 81 milliGRAM(s) Oral daily  bisacodyl 5 milliGRAM(s) Oral every 12 hours  buDESOnide    Inhalation Suspension 0.5 milliGRAM(s) Inhalation every 12 hours  busPIRone 10 milliGRAM(s) Oral two times a day  cholecalciferol 2000 Unit(s) Oral at bedtime  cyanocobalamin 1000 MICROGram(s) Oral at bedtime  diltiazem    milliGRAM(s) Oral daily  doxazosin 1 milliGRAM(s) Oral at bedtime  enoxaparin Injectable 40 milliGRAM(s) SubCutaneous every 24 hours  furosemide   Injectable 40 milliGRAM(s) IV Push two times a day  gabapentin 400 milliGRAM(s) Oral three times a day  hemorrhoidal Ointment 1 Application(s) Rectal two times a day  melatonin 5 milliGRAM(s) Oral at bedtime  methylPREDNISolone sodium succinate Injectable 40 milliGRAM(s) IV Push every 12 hours  metoprolol tartrate 25 milliGRAM(s) Oral two times a day  pantoprazole    Tablet 40 milliGRAM(s) Oral before breakfast  QUEtiapine 100 milliGRAM(s) Oral at bedtime  spironolactone 25 milliGRAM(s) Oral daily  thiamine 100 milliGRAM(s) Oral at bedtime      MEDICATIONS  (PRN):  acetaminophen     Tablet .. 650 milliGRAM(s) Oral every 6 hours PRN Temp greater or equal to 38C (100.4F), Mild Pain (1 - 3)  aluminum hydroxide/magnesium hydroxide/simethicone Suspension 30 milliLiter(s) Oral every 4 hours PRN Dyspepsia  metoprolol tartrate Injectable 5 milliGRAM(s) IV Push every 6 hours PRN HR>130  ondansetron Injectable 4 milliGRAM(s) IV Push every 8 hours PRN Nausea and/or Vomiting    Vital Signs Last 24 Hrs  T(C): 36.6 (31 May 2023 08:00), Max: 36.7 (30 May 2023 16:07)  T(F): 97.9 (31 May 2023 08:00), Max: 98.1 (30 May 2023 16:07)  HR: 98 (31 May 2023 13:30) (91 - 106)  BP: 106/59 (31 May 2023 13:30) (106/59 - 131/80)  BP(mean): 69 (31 May 2023 13:30) (69 - 75)  RR: 18 (31 May 2023 13:30) (18 - 18)  SpO2: 95% (31 May 2023 13:30) (93% - 97%)    Parameters below as of 31 May 2023 13:30  Patient On (Oxygen Delivery Method): nasal cannula  O2 Flow (L/min): 5      PHYSICAL EXAM:    Constitutional: NAD, awake and alert, well-developed  HEENT: PERR, EOMI,  No oral cyananosis.  Neck:  supple,  No JVD  Respiratory: exp wheezing noted   Cardiovascular: S1 and S2,irreg rhythm, no Murmurs, gallops or rubs  Gastrointestinal: Bowel Sounds present, soft, nontender.   Extremities: L BKA, right no peripheral edema. No clubbing or cyanosis,  2+ peripheral pulses  Neurological: A/O x 3, no focal deficits  Musculoskeletal: no calf tenderness.  Skin: No rashes.      LABS: All Labs Reviewed:                          11.9   10.63 )-----------( 153      ( 30 May 2023 07:13 )             36.6                           11.6   5.66  )-----------( 138      ( 29 May 2023 08:36 )             36.3                         12.1   5.55  )-----------( 149      ( 28 May 2023 06:51 )             38.5                         11.7   5.48  )-----------( 129      ( 27 May 2023 09:22 )             36.1     05-30    136  |  93<L>  |  22  ----------------------------<  158<H>  4.1   |  41<H>  |  0.75    Ca    9.1      30 May 2023 07:13  Phos  3.3     05-29  Mg     2.1     05-29    TPro  7.3  /  Alb  3.0<L>  /  TBili  0.4  /  DBili  0.1  /  AST  17  /  ALT  19  /  AlkPhos  80  05-30      29 May 2023 08:36    x      |  x      |  18     ----------------------------<  x      x       |  x      |  x      28 May 2023 06:51    135    |  93     |  12     ----------------------------<  100    4.1     |  38     |  0.80   27 May 2023 09:22    137    |  96     |  11     ----------------------------<  109    3.5     |  37     |  0.93     Ca    8.7        28 May 2023 06:51  Ca    8.4        27 May 2023 09:22  Ca    8.4        27 May 2023 01:23  Phos  3.3       29 May 2023 08:36  Phos  3.4       28 May 2023 06:51  Phos  3.9       27 May 2023 09:22  Mg     2.1       29 May 2023 08:36  Mg     2.0       28 May 2023 06:51  Mg     1.4       27 May 2023 09:22    TPro  7.2    /  Alb  2.9    /  TBili  0.5    /  DBili  x      /  AST  25     /  ALT  19     /  AlkPhos  92     28 May 2023 06:51  TPro  7.1    /  Alb  3.0    /  TBili  0.5    /  DBili  x      /  AST  15     /  ALT  18     /  AlkPhos  95     27 May 2023 09:22  TPro  7.2    /  Alb  2.9    /  TBili  0.4    /  DBili  x      /  AST  21     /  ALT  22     /  AlkPhos  97     27 May 2023 01:23      CARDIAC MARKERS ( 29 May 2023 08:36 )  x     / x     / 65 U/L / x     / x          Blood Culture: Organism --  Gram Stain Blood -- Gram Stain --  Specimen Source Clean Catch None  Culture-Blood --    Organism --  Gram Stain Blood -- Gram Stain --  Specimen Source .Blood Blood-Peripheral  Culture-Blood --        05-27 @ 09:22  TSH: 0.96      RADIOLOGY/EKG:

## 2023-05-31 NOTE — PROGRESS NOTE ADULT - SUBJECTIVE AND OBJECTIVE BOX
Subjective:  awake and alert  no distress    5/31: awake, alert, no distress.  breathing a little better.    MEDICATIONS  (STANDING):  albuterol/ipratropium for Nebulization 3 milliLiter(s) Nebulizer every 6 hours  azithromycin  IVPB 500 milliGRAM(s) IV Intermittent every 24 hours  azithromycin  IVPB      bisacodyl 5 milliGRAM(s) Oral every 12 hours  buDESOnide    Inhalation Suspension 0.5 milliGRAM(s) Inhalation every 12 hours  busPIRone 10 milliGRAM(s) Oral two times a day  cholecalciferol 2000 Unit(s) Oral at bedtime  cyanocobalamin 1000 MICROGram(s) Oral at bedtime  diltiazem    milliGRAM(s) Oral daily  doxazosin 1 milliGRAM(s) Oral at bedtime  enoxaparin Injectable 40 milliGRAM(s) SubCutaneous every 24 hours  furosemide   Injectable 40 milliGRAM(s) IV Push two times a day  gabapentin 400 milliGRAM(s) Oral three times a day  hemorrhoidal Ointment 1 Application(s) Rectal two times a day  melatonin 5 milliGRAM(s) Oral at bedtime  methylPREDNISolone sodium succinate Injectable 40 milliGRAM(s) IV Push every 8 hours  metoprolol tartrate 25 milliGRAM(s) Oral two times a day  pantoprazole    Tablet 40 milliGRAM(s) Oral before breakfast  QUEtiapine 100 milliGRAM(s) Oral at bedtime  spironolactone 25 milliGRAM(s) Oral daily  thiamine 100 milliGRAM(s) Oral at bedtime    MEDICATIONS  (PRN):  acetaminophen     Tablet .. 650 milliGRAM(s) Oral every 6 hours PRN Temp greater or equal to 38C (100.4F), Mild Pain (1 - 3)  aluminum hydroxide/magnesium hydroxide/simethicone Suspension 30 milliLiter(s) Oral every 4 hours PRN Dyspepsia  metoprolol tartrate Injectable 5 milliGRAM(s) IV Push every 6 hours PRN HR>130  ondansetron Injectable 4 milliGRAM(s) IV Push every 8 hours PRN Nausea and/or Vomiting      Allergies    Duricef (Rash)  Ceclor (Rash)    Intolerances        REVIEW OF SYSTEMS: as per previous        Vital Signs Last 24 Hrs  T(C): 36.3 (29 May 2023 20:30), Max: 36.3 (29 May 2023 20:30)  T(F): 97.3 (29 May 2023 20:30), Max: 97.3 (29 May 2023 20:30)  HR: 96 (30 May 2023 08:08) (88 - 100)  BP: 115/66 (30 May 2023 06:00) (115/66 - 141/68)  BP(mean): --  RR: 20 (29 May 2023 20:30) (20 - 20)  SpO2: 96% (29 May 2023 21:05) (92% - 97%)    Parameters below as of 30 May 2023 08:08  Patient On (Oxygen Delivery Method): nasal cannula, 5lpm        PHYSICAL EXAMINATION:  SKIN: no rashes  HEAD: NC/AT  EYES: PERRLA, EOMI  NECK:  Supple. No lymphadenopathy. Jugular venous pressure not elevated. Carotids equal.   HEART:  S1S2 irreg  CHEST:   rhonchi  ABDOMEN:  Soft and nontender. obese  EXTREMITIES:  left bka  NEURO: AAO X 3      LABS:                        11.9   10.63 )-----------( 153      ( 30 May 2023 07:13 )             36.6     05-29    x   |  x   |  18  ----------------------------<  x   x    |  x   |  x     Phos  3.3     05-29  Mg     2.1     05-29            RADIOLOGY & ADDITIONAL TESTS:

## 2023-05-31 NOTE — PROGRESS NOTE ADULT - SUBJECTIVE AND OBJECTIVE BOX
HOSPITALIST ATTENDING PROGRESS NOTE    Chart and meds reviewed.  Patient seen and examined.    CC: SOB    Subjective: Reports breathing is slowly improving, but is not at baseline.     All other systems reviewed and found to be negative with the exception of what has been described above.    MEDICATIONS  (STANDING):  albuterol/ipratropium for Nebulization 3 milliLiter(s) Nebulizer every 6 hours  aspirin  chewable 81 milliGRAM(s) Oral daily  bisacodyl 5 milliGRAM(s) Oral every 12 hours  buDESOnide    Inhalation Suspension 0.5 milliGRAM(s) Inhalation every 12 hours  busPIRone 10 milliGRAM(s) Oral two times a day  cholecalciferol 2000 Unit(s) Oral at bedtime  cyanocobalamin 1000 MICROGram(s) Oral at bedtime  diltiazem    milliGRAM(s) Oral daily  doxazosin 1 milliGRAM(s) Oral at bedtime  enoxaparin Injectable 40 milliGRAM(s) SubCutaneous every 24 hours  furosemide   Injectable 40 milliGRAM(s) IV Push two times a day  gabapentin 400 milliGRAM(s) Oral three times a day  hemorrhoidal Ointment 1 Application(s) Rectal two times a day  melatonin 5 milliGRAM(s) Oral at bedtime  methylPREDNISolone sodium succinate Injectable 40 milliGRAM(s) IV Push every 12 hours  metoprolol tartrate 25 milliGRAM(s) Oral two times a day  pantoprazole    Tablet 40 milliGRAM(s) Oral before breakfast  QUEtiapine 100 milliGRAM(s) Oral at bedtime  spironolactone 25 milliGRAM(s) Oral daily  thiamine 100 milliGRAM(s) Oral at bedtime    MEDICATIONS  (PRN):  acetaminophen     Tablet .. 650 milliGRAM(s) Oral every 6 hours PRN Temp greater or equal to 38C (100.4F), Mild Pain (1 - 3)  aluminum hydroxide/magnesium hydroxide/simethicone Suspension 30 milliLiter(s) Oral every 4 hours PRN Dyspepsia  metoprolol tartrate Injectable 5 milliGRAM(s) IV Push every 6 hours PRN HR>130  ondansetron Injectable 4 milliGRAM(s) IV Push every 8 hours PRN Nausea and/or Vomiting      VITALS:  T(F): 97.9 (05-31-23 @ 08:00), Max: 97.9 (05-31-23 @ 08:00)  HR: 98 (05-31-23 @ 13:30) (95 - 106)  BP: 106/59 (05-31-23 @ 13:30) (106/59 - 131/80)  RR: 18 (05-31-23 @ 13:30) (18 - 18)  SpO2: 95% (05-31-23 @ 13:30) (93% - 97%)  Wt(kg): --    I&O's Summary    30 May 2023 07:01  -  31 May 2023 07:00  --------------------------------------------------------  IN: 0 mL / OUT: 1650 mL / NET: -1650 mL    31 May 2023 07:01  -  31 May 2023 16:37  --------------------------------------------------------  IN: 0 mL / OUT: 1000 mL / NET: -1000 mL        CAPILLARY BLOOD GLUCOSE          PHYSICAL EXAM:  Gen: NAD  HEENT:  pupils equal and reactive, EOMI, no oropharyngeal lesions, erythema, exudates, oral thrush  NECK:   supple, no carotid bruits, no palpable lymph nodes, no thyromegaly  CV:  +S1, +S2, regular, no murmurs or rubs  RESP:   good air entry b/l, end exp wheezes  BREAST:  not examined  GI:  abdomen soft, non-tender, non-distended, normal BS, no bruits, no abdominal masses, no palpable masses  RECTAL:  not examined  :  not examined  MSK:   normal muscle tone, no atrophy, no rigidity, no contractions  EXT:  no clubbing, no cyanosis, no edema, no calf pain, swelling or erythema  VASCULAR:  pulses equal and symmetric in the upper and lower extremities  NEURO:  AAOX3, no focal neurological deficits, follows all commands, able to move extremities spontaneously  SKIN:  no ulcers, lesions or rashes    LABS:                            11.9   10.63 )-----------( 153      ( 30 May 2023 07:13 )             36.6     05-31    131<L>  |  88<L>  |  27<H>  ----------------------------<  142<H>  4.3   |  40<H>  |  0.89    Ca    9.1      31 May 2023 09:15  Mg     2.1     05-31    TPro  7.3  /  Alb  3.0<L>  /  TBili  0.4  /  DBili  0.1  /  AST  17  /  ALT  19  /  AlkPhos  80  05-30        LIVER FUNCTIONS - ( 30 May 2023 07:13 )  Alb: 3.0 g/dL / Pro: 7.3 gm/dL / ALK PHOS: 80 U/L / ALT: 19 U/L / AST: 17 U/L / GGT: x           CULTURES:  Micro:  Urine culture 5/27: Negative  Blood culture 5/27: Negative    Imaging:  XR ankle R 5/27: no fracture, dislocation, soft tissue swelling or joint effusion. Degenerative changes at the ankle joint. Small plantar and retrocalcaneal spurs. Degenerative changes of the talonavicular joint. No radiopaque foreign body.    US Duplex Venous Lower Ext Complete, Bilateral 5/27: No evidence of deep venous thrombosis in either lower extremity.    CT Abdomen and Pelvis WO 5/27: Cirrhotic liver with mild splenomegaly. No ascites. Mildly enlarged 3.1cm splenic hypodense low-attenuation lesion of unclear etiology, possibly a cyst. Consider abdominal MRI for further evaluation of these findings. No bowel distention seen.    CT Chest WO 5/27: 1. New 9 mm left upper lobe nodular opacity and nonspecific dependent groundglass may be infectious or inflammatory. Additional peripheral reticular opacities likely reflect component of chronic interstitial lung disease. Slightly enlarged 5 mm right lower lobe pulmonary nodule since 2020, of unclear significance. Follow-up chest CT is recommended in 6 weeks to evaluate for stability versus resolution of these findings. Cardiac enlargement, multivessel coronary artery calcification, and Watchman device noted. Significantly enlarged main pulmonary artery likely reflects pulmonary arterial hypertension.    Cardiac Testing:  EKG 5/27: Afib with RVR    Echo 5/30/23: in paper chart    Telemetry, personally reviewed:  5/31/23: afib , d/c tele

## 2023-05-31 NOTE — PROGRESS NOTE ADULT - ASSESSMENT
- patient now on 5L nasal canula; no distress  - BIPAP at nite   - on duoneb/budesonide aerosol  - decrease iv steroids  - noncompliant w BIPAP  - in afib w rate controlled  - on lovenox q24h

## 2023-06-01 LAB
ANION GAP SERPL CALC-SCNC: 1 MMOL/L — LOW (ref 5–17)
BUN SERPL-MCNC: 28 MG/DL — HIGH (ref 7–23)
CALCIUM SERPL-MCNC: 9 MG/DL — SIGNIFICANT CHANGE UP (ref 8.5–10.1)
CHLORIDE SERPL-SCNC: 90 MMOL/L — LOW (ref 96–108)
CO2 SERPL-SCNC: 41 MMOL/L — HIGH (ref 22–31)
CREAT SERPL-MCNC: 0.89 MG/DL — SIGNIFICANT CHANGE UP (ref 0.5–1.3)
CULTURE RESULTS: SIGNIFICANT CHANGE UP
CULTURE RESULTS: SIGNIFICANT CHANGE UP
EGFR: 95 ML/MIN/1.73M2 — SIGNIFICANT CHANGE UP
GLUCOSE SERPL-MCNC: 161 MG/DL — HIGH (ref 70–99)
POTASSIUM SERPL-MCNC: 4.4 MMOL/L — SIGNIFICANT CHANGE UP (ref 3.5–5.3)
POTASSIUM SERPL-SCNC: 4.4 MMOL/L — SIGNIFICANT CHANGE UP (ref 3.5–5.3)
SODIUM SERPL-SCNC: 132 MMOL/L — LOW (ref 135–145)
SPECIMEN SOURCE: SIGNIFICANT CHANGE UP
SPECIMEN SOURCE: SIGNIFICANT CHANGE UP

## 2023-06-01 PROCEDURE — 99232 SBSQ HOSP IP/OBS MODERATE 35: CPT

## 2023-06-01 PROCEDURE — 99233 SBSQ HOSP IP/OBS HIGH 50: CPT

## 2023-06-01 RX ADMIN — Medication 0.5 MILLIGRAM(S): at 08:55

## 2023-06-01 RX ADMIN — Medication 5 MILLIGRAM(S): at 22:20

## 2023-06-01 RX ADMIN — Medication 40 MILLIGRAM(S): at 10:00

## 2023-06-01 RX ADMIN — Medication 10 MILLIGRAM(S): at 22:20

## 2023-06-01 RX ADMIN — Medication 40 MILLIGRAM(S): at 18:19

## 2023-06-01 RX ADMIN — Medication 40 MILLIGRAM(S): at 05:08

## 2023-06-01 RX ADMIN — Medication 650 MILLIGRAM(S): at 09:53

## 2023-06-01 RX ADMIN — Medication 2000 UNIT(S): at 22:22

## 2023-06-01 RX ADMIN — Medication 5 MILLIGRAM(S): at 22:21

## 2023-06-01 RX ADMIN — SPIRONOLACTONE 25 MILLIGRAM(S): 25 TABLET, FILM COATED ORAL at 10:59

## 2023-06-01 RX ADMIN — Medication 10 MILLIGRAM(S): at 10:59

## 2023-06-01 RX ADMIN — Medication 3 MILLILITER(S): at 21:10

## 2023-06-01 RX ADMIN — PANTOPRAZOLE SODIUM 40 MILLIGRAM(S): 20 TABLET, DELAYED RELEASE ORAL at 05:08

## 2023-06-01 RX ADMIN — GABAPENTIN 400 MILLIGRAM(S): 400 CAPSULE ORAL at 05:07

## 2023-06-01 RX ADMIN — GABAPENTIN 400 MILLIGRAM(S): 400 CAPSULE ORAL at 13:47

## 2023-06-01 RX ADMIN — QUETIAPINE FUMARATE 100 MILLIGRAM(S): 200 TABLET, FILM COATED ORAL at 22:20

## 2023-06-01 RX ADMIN — PHENYLEPHRINE-SHARK LIVER OIL-MINERAL OIL-PETROLATUM RECTAL OINTMENT 1 APPLICATION(S): at 18:22

## 2023-06-01 RX ADMIN — Medication 100 MILLIGRAM(S): at 22:21

## 2023-06-01 RX ADMIN — ENOXAPARIN SODIUM 40 MILLIGRAM(S): 100 INJECTION SUBCUTANEOUS at 22:19

## 2023-06-01 RX ADMIN — PREGABALIN 1000 MICROGRAM(S): 225 CAPSULE ORAL at 22:21

## 2023-06-01 RX ADMIN — Medication 3 MILLILITER(S): at 08:55

## 2023-06-01 RX ADMIN — Medication 650 MILLIGRAM(S): at 01:05

## 2023-06-01 RX ADMIN — Medication 5 MILLIGRAM(S): at 10:58

## 2023-06-01 RX ADMIN — Medication 25 MILLIGRAM(S): at 10:59

## 2023-06-01 RX ADMIN — GABAPENTIN 400 MILLIGRAM(S): 400 CAPSULE ORAL at 22:20

## 2023-06-01 RX ADMIN — Medication 180 MILLIGRAM(S): at 11:00

## 2023-06-01 RX ADMIN — Medication 40 MILLIGRAM(S): at 16:05

## 2023-06-01 RX ADMIN — Medication 81 MILLIGRAM(S): at 10:58

## 2023-06-01 RX ADMIN — Medication 0.5 MILLIGRAM(S): at 21:10

## 2023-06-01 NOTE — PROVIDER CONTACT NOTE (OTHER) - ASSESSMENT
pt sleepy/lethargic falling asleep while talking to him. VSS. consult placed during day shift and pt was to go on bipap but he refused. overnight was on BIPAP 10P-1AM
RN measured leg

## 2023-06-01 NOTE — PROGRESS NOTE ADULT - SUBJECTIVE AND OBJECTIVE BOX
Subjective:    Awake, comfortable at rest. Scant sputum.    MEDICATIONS  (STANDING):  albuterol/ipratropium for Nebulization 3 milliLiter(s) Nebulizer every 6 hours  aspirin  chewable 81 milliGRAM(s) Oral daily  bisacodyl 5 milliGRAM(s) Oral every 12 hours  buDESOnide    Inhalation Suspension 0.5 milliGRAM(s) Inhalation every 12 hours  busPIRone 10 milliGRAM(s) Oral two times a day  cholecalciferol 2000 Unit(s) Oral at bedtime  cyanocobalamin 1000 MICROGram(s) Oral at bedtime  diltiazem    milliGRAM(s) Oral daily  doxazosin 1 milliGRAM(s) Oral at bedtime  enoxaparin Injectable 40 milliGRAM(s) SubCutaneous every 24 hours  furosemide   Injectable 40 milliGRAM(s) IV Push two times a day  gabapentin 400 milliGRAM(s) Oral three times a day  hemorrhoidal Ointment 1 Application(s) Rectal two times a day  melatonin 5 milliGRAM(s) Oral at bedtime  methylPREDNISolone sodium succinate Injectable 40 milliGRAM(s) IV Push every 12 hours  metoprolol tartrate 25 milliGRAM(s) Oral two times a day  pantoprazole    Tablet 40 milliGRAM(s) Oral before breakfast  QUEtiapine 100 milliGRAM(s) Oral at bedtime  spironolactone 25 milliGRAM(s) Oral daily  thiamine 100 milliGRAM(s) Oral at bedtime    MEDICATIONS  (PRN):  acetaminophen     Tablet .. 650 milliGRAM(s) Oral every 6 hours PRN Temp greater or equal to 38C (100.4F), Mild Pain (1 - 3)  aluminum hydroxide/magnesium hydroxide/simethicone Suspension 30 milliLiter(s) Oral every 4 hours PRN Dyspepsia  ondansetron Injectable 4 milliGRAM(s) IV Push every 8 hours PRN Nausea and/or Vomiting      Allergies    Duricef (Rash)  Ceclor (Rash)    Intolerances        Vital Signs Last 24 Hrs  T(C): 36.4 (01 Jun 2023 08:12), Max: 36.8 (31 May 2023 18:27)  T(F): 97.5 (01 Jun 2023 08:12), Max: 98.2 (31 May 2023 18:27)  HR: 93 (01 Jun 2023 08:55) (63 - 98)  BP: 114/65 (01 Jun 2023 08:12) (104/57 - 116/64)  BP(mean): 69 (31 May 2023 13:30) (69 - 69)  RR: 18 (01 Jun 2023 08:12) (18 - 18)  SpO2: 95% (01 Jun 2023 08:55) (93% - 97%)    Parameters below as of 01 Jun 2023 08:12  Patient On (Oxygen Delivery Method): nasal cannula        PHYSICAL EXAMINATION:    NECK:  Supple. No lymphadenopathy. Jugular venous pressure not elevated.   HEART:   The cardiac impulse has a normal quality. Reg., Nl S1 and S2.    CHEST:  Chest with bibasilar crackles and sl. exp. wheezes. Normal respiratory effort.  ABDOMEN:  Soft and nontender.   EXTREMITIES:  There is 2+ edema.       LABS:    06-01    132<L>  |  90<L>  |  28<H>  ----------------------------<  161<H>  4.4   |  41<H>  |  0.89    Ca    9.0      01 Jun 2023 09:28  Mg     2.1     05-31            RADIOLOGY & ADDITIONAL TESTS:    Assessment and Plan:   · Assessment	  - patient now on 5L nasal canula; no distress  - on duoneb/budesonide aerosol  - Maintain iv steroids  - noncompliant w BIPAP  - in afib w rate controlled  - on lovenox q24h; ? full dose AC    Problem/Plan - 1:  ·  Problem: Acute on chronic respiratory failure with hypoxia and hypercapnia.   Problem/Plan - 2:  ·  Problem: Infection due to parainfluenza virus 3.   Problem/Plan - 3:  ·  Problem: VIJAY and COPD overlap syndrome.   Problem/Plan - 4:  ·  Problem: Acute bronchospasm.   Problem/Plan - 5:  ·  Problem: Pulmonary hypertension.   Problem/Plan - 6:  ·  Problem: Obesity hypoventilation syndrome.   Problem/Plan - 7:  ·  Problem: ILD (interstitial lung disease).   Problem/Plan - 8:  ·  Problem: Nodule of upper lobe of left lung.

## 2023-06-01 NOTE — PROGRESS NOTE ADULT - SUBJECTIVE AND OBJECTIVE BOX
HOSPITALIST ATTENDING PROGRESS NOTE    Chart and meds reviewed.  Patient seen and examined.    CC: SOB    Subjective: Reports breathing is the same today, feeling upset/irritated about wanting to get up out of bed to use bathroom to clean up and shave. Discussed w RN .    All other systems reviewed and found to be negative with the exception of what has been described above.    MEDICATIONS  (STANDING):  albuterol/ipratropium for Nebulization 3 milliLiter(s) Nebulizer every 6 hours  aspirin  chewable 81 milliGRAM(s) Oral daily  bisacodyl 5 milliGRAM(s) Oral every 12 hours  buDESOnide    Inhalation Suspension 0.5 milliGRAM(s) Inhalation every 12 hours  busPIRone 10 milliGRAM(s) Oral two times a day  cholecalciferol 2000 Unit(s) Oral at bedtime  cyanocobalamin 1000 MICROGram(s) Oral at bedtime  diltiazem    milliGRAM(s) Oral daily  doxazosin 1 milliGRAM(s) Oral at bedtime  enoxaparin Injectable 40 milliGRAM(s) SubCutaneous every 24 hours  furosemide   Injectable 40 milliGRAM(s) IV Push two times a day  gabapentin 400 milliGRAM(s) Oral three times a day  hemorrhoidal Ointment 1 Application(s) Rectal two times a day  melatonin 5 milliGRAM(s) Oral at bedtime  methylPREDNISolone sodium succinate Injectable 40 milliGRAM(s) IV Push every 12 hours  metoprolol tartrate 25 milliGRAM(s) Oral two times a day  pantoprazole    Tablet 40 milliGRAM(s) Oral before breakfast  QUEtiapine 100 milliGRAM(s) Oral at bedtime  spironolactone 25 milliGRAM(s) Oral daily  thiamine 100 milliGRAM(s) Oral at bedtime    MEDICATIONS  (PRN):  acetaminophen     Tablet .. 650 milliGRAM(s) Oral every 6 hours PRN Temp greater or equal to 38C (100.4F), Mild Pain (1 - 3)  aluminum hydroxide/magnesium hydroxide/simethicone Suspension 30 milliLiter(s) Oral every 4 hours PRN Dyspepsia  ondansetron Injectable 4 milliGRAM(s) IV Push every 8 hours PRN Nausea and/or Vomiting      VITALS:  T(F): 97.5 (06-01-23 @ 08:12), Max: 98.2 (05-31-23 @ 18:27)  HR: 93 (06-01-23 @ 08:55) (63 - 95)  BP: 114/65 (06-01-23 @ 08:12) (104/57 - 116/64)  RR: 18 (06-01-23 @ 08:12) (18 - 18)  SpO2: 95% (06-01-23 @ 08:55) (93% - 97%)  Wt(kg): --    I&O's Summary    31 May 2023 07:01  -  01 Jun 2023 07:00  --------------------------------------------------------  IN: 550 mL / OUT: 3100 mL / NET: -2550 mL        CAPILLARY BLOOD GLUCOSE          PHYSICAL EXAM:  Gen: NAD  HEENT:  pupils equal and reactive, EOMI, no oropharyngeal lesions, erythema, exudates, oral thrush  NECK:   supple, no carotid bruits, no palpable lymph nodes, no thyromegaly  CV:  +S1, +S2, regular, no murmurs or rubs  RESP:   fair air mvmnt, b/l end exp wheeze  BREAST:  not examined  GI:  abdomen soft, non-tender, non-distended, normal BS, no bruits, no abdominal masses, no palpable masses  RECTAL:  not examined  :  not examined  MSK:   normal muscle tone, no atrophy, no rigidity, no contractions  EXT:  no clubbing, no cyanosis, no edema, no calf pain, swelling or erythema  VASCULAR:  pulses equal and symmetric in the upper and lower extremities  NEURO:  AAOX3, no focal neurological deficits, follows all commands, able to move extremities spontaneously  SKIN:  no ulcers, lesions or rashes    LABS:        06-01    132<L>  |  90<L>  |  28<H>  ----------------------------<  161<H>  4.4   |  41<H>  |  0.89    Ca    9.0      01 Jun 2023 09:28  Mg     2.1     05-31    CULTURES:  Urine culture 5/27: Negative  Blood culture 5/27: Negative    Imaging:  XR ankle R 5/27: no fracture, dislocation, soft tissue swelling or joint effusion. Degenerative changes at the ankle joint. Small plantar and retrocalcaneal spurs. Degenerative changes of the talonavicular joint. No radiopaque foreign body.    US Duplex Venous Lower Ext Complete, Bilateral 5/27: No evidence of deep venous thrombosis in either lower extremity.    CT Abdomen and Pelvis WO 5/27: Cirrhotic liver with mild splenomegaly. No ascites. Mildly enlarged 3.1cm splenic hypodense low-attenuation lesion of unclear etiology, possibly a cyst. Consider abdominal MRI for further evaluation of these findings. No bowel distention seen.    CT Chest WO 5/27: 1. New 9 mm left upper lobe nodular opacity and nonspecific dependent groundglass may be infectious or inflammatory. Additional peripheral reticular opacities likely reflect component of chronic interstitial lung disease. Slightly enlarged 5 mm right lower lobe pulmonary nodule since 2020, of unclear significance. Follow-up chest CT is recommended in 6 weeks to evaluate for stability versus resolution of these findings. Cardiac enlargement, multivessel coronary artery calcification, and Watchman device noted. Significantly enlarged main pulmonary artery likely reflects pulmonary arterial hypertension.    Cardiac Testing:  EKG 5/27: Afib with RVR    Echo 5/30/23: EF 60%    Telemetry, personally reviewed:  5/31/23: afib , d/c tele

## 2023-06-01 NOTE — PROVIDER CONTACT NOTE (OTHER) - BACKGROUND
Pt is a transfer from another floor. Pt is positive for parainfluenza. Pt has CHF. Dopplers from 5/27 were negative for DVT in the lower extremities (bilaterally).

## 2023-06-01 NOTE — PROGRESS NOTE ADULT - SUBJECTIVE AND OBJECTIVE BOX
HPI:  67 y/o male with PMH of HFpEF, parox atrial fibrillation, Watchman Implant, MR , COPD on 4 L of home O2,  ETOH induced cirrhosis,  HTN, obesity, VIJAY (noncompliant w/ CPAP), alcoholism, pulmonary HTN,  Left BKA  presents to  with 1 day history of difficulty breathing, productive cough, wheezing.  Patient states that he has had worsening shortness of breath since yesterday and this evening he had much worsening of symptoms.  Brought in by EMS who stated he was very short of breath and oxygen was in the high 80s/low 90s upon arrival.  Improved with supplemental oxygen and albuterol nebulizer treatment. Patient also reports episode of rectal bleeding episode at home.     in ED - [facility]  T  Max: 37.1, T(F) Max: 98.7 , HR: 106  (106 - 110), BP: 128/66 (124/56 - 128/66) RR: 16  (16 - 30) SpO2: 97%  (86% - 100%) EKG  A fib 112, Lactate 2.2--> 1.4, Mg 1.3, troponin 7.5 , , venous blood gas Ph 7.34 Parainfluenza positive, CXR - mild pulmonary edema, s/p , tylenol, duoneb, lasix 80 s/p BIPAP     5/29/23: Pt feels improved.   5/30/23: Pt denies chest pain.  Still has shortness of breath.  Tele: afib rate controlled.  5/31/23: C/O SOB, Tele: Afib   6/1/23: less SOB, took off his monitor     MEDICATIONS  (STANDING):  albuterol/ipratropium for Nebulization 3 milliLiter(s) Nebulizer every 6 hours  aspirin  chewable 81 milliGRAM(s) Oral daily  bisacodyl 5 milliGRAM(s) Oral every 12 hours  buDESOnide    Inhalation Suspension 0.5 milliGRAM(s) Inhalation every 12 hours  busPIRone 10 milliGRAM(s) Oral two times a day  cholecalciferol 2000 Unit(s) Oral at bedtime  cyanocobalamin 1000 MICROGram(s) Oral at bedtime  diltiazem    milliGRAM(s) Oral daily  doxazosin 1 milliGRAM(s) Oral at bedtime  enoxaparin Injectable 40 milliGRAM(s) SubCutaneous every 24 hours  furosemide   Injectable 40 milliGRAM(s) IV Push two times a day  gabapentin 400 milliGRAM(s) Oral three times a day  hemorrhoidal Ointment 1 Application(s) Rectal two times a day  melatonin 5 milliGRAM(s) Oral at bedtime  methylPREDNISolone sodium succinate Injectable 40 milliGRAM(s) IV Push every 12 hours  metoprolol tartrate 25 milliGRAM(s) Oral two times a day  pantoprazole    Tablet 40 milliGRAM(s) Oral before breakfast  QUEtiapine 100 milliGRAM(s) Oral at bedtime  spironolactone 25 milliGRAM(s) Oral daily  thiamine 100 milliGRAM(s) Oral at bedtime      MEDICATIONS  (PRN):  acetaminophen     Tablet .. 650 milliGRAM(s) Oral every 6 hours PRN Temp greater or equal to 38C (100.4F), Mild Pain (1 - 3)  aluminum hydroxide/magnesium hydroxide/simethicone Suspension 30 milliLiter(s) Oral every 4 hours PRN Dyspepsia  metoprolol tartrate Injectable 5 milliGRAM(s) IV Push every 6 hours PRN HR>130  ondansetron Injectable 4 milliGRAM(s) IV Push every 8 hours PRN Nausea and/or Vomiting    Vital Signs Last 24 Hrs  T(C): 36.4 (01 Jun 2023 08:12), Max: 36.8 (31 May 2023 18:27)  T(F): 97.5 (01 Jun 2023 08:12), Max: 98.2 (31 May 2023 18:27)  HR: 93 (01 Jun 2023 08:55) (63 - 98)  BP: 114/65 (01 Jun 2023 08:12) (104/57 - 116/64)  BP(mean): 69 (31 May 2023 13:30) (69 - 69)  RR: 18 (01 Jun 2023 08:12) (18 - 18)  SpO2: 95% (01 Jun 2023 08:55) (93% - 97%)    Parameters below as of 01 Jun 2023 08:12  Patient On (Oxygen Delivery Method): nasal cannula      PHYSICAL EXAM:    Constitutional: NAD, awake and alert, well-developed  HEENT: PERR, EOMI,  No oral cyananosis.  Neck:  supple,  No JVD  Respiratory: exp wheezing noted   Cardiovascular: S1 and S2,irreg rhythm, no Murmurs, gallops or rubs  Gastrointestinal: Bowel Sounds present, soft, nontender.   Extremities: L BKA, right no peripheral edema. No clubbing or cyanosis,  2+ peripheral pulses  Neurological: A/O x 3, no focal deficits  Musculoskeletal: no calf tenderness.  Skin: No rashes.      LABS: All Labs Reviewed:      06-01    132<L>  |  90<L>  |  28<H>  ----------------------------<  161<H>  4.4   |  41<H>  |  0.89    Ca    9.0      01 Jun 2023 09:28  Mg     2.1     05-31      - TroponinI hsT: <-8.42, <-7.18, <-7.58      RADIOLOGY/EKG: rviewed    < from: 12 Lead ECG (05.28.23 @ 07:28) >    Ventricular Rate 123 BPM    QRS Duration 86 ms    Q-T Interval 294 ms    QTC Calculation(Bazett) 420 ms    R Axis 72 degrees    T Axis 53 degrees    Diagnosis Line Atrial fibrillation with rapid ventricular response with premature ventricular or aberrantly conducted complexes  Anteroseptal infarct (cited on or before 17-AUG-2017)  Abnormal ECG  When compared with ECG of 28-MAY-2023 07:27,  No significant change was found  Confirmed by Palla MD, Omar [id number]) on 5/28/2023 10:40:24 PM    < end of copied text >                   REASON FOR VISIT: AF, CHF, Rx management      HPI:  69 y/o male with PMH of HFpEF, parox atrial fibrillation, Watchman Implant, MR , COPD on 4 L of home O2,  ETOH induced cirrhosis,  HTN, obesity, VIJAY (noncompliant w/ CPAP), alcoholism, pulmonary HTN,  Left BKA  presents to  with 1 day history of difficulty breathing, productive cough, wheezing.  Patient states that he has had worsening shortness of breath since yesterday and this evening he had much worsening of symptoms.  Brought in by EMS who stated he was very short of breath and oxygen was in the high 80s/low 90s upon arrival.  Improved with supplemental oxygen and albuterol nebulizer treatment. Patient also reports episode of rectal bleeding episode at home.     in ED - [facility]  T  Max: 37.1, T(F) Max: 98.7 , HR: 106  (106 - 110), BP: 128/66 (124/56 - 128/66) RR: 16  (16 - 30) SpO2: 97%  (86% - 100%) EKG  A fib 112, Lactate 2.2--> 1.4, Mg 1.3, troponin 7.5 , , venous blood gas Ph 7.34 Parainfluenza positive, CXR - mild pulmonary edema, s/p , tylenol, duoneb, lasix 80 s/p BIPAP     5/29/23: Pt feels improved.   5/30/23: Pt denies chest pain.  Still has shortness of breath.  Tele: afib rate controlled.  5/31/23: C/O SOB, Tele: Afib   6/1/23: less SOB, took off his monitor     MEDICATIONS  (STANDING):  albuterol/ipratropium for Nebulization 3 milliLiter(s) Nebulizer every 6 hours  aspirin  chewable 81 milliGRAM(s) Oral daily  bisacodyl 5 milliGRAM(s) Oral every 12 hours  buDESOnide    Inhalation Suspension 0.5 milliGRAM(s) Inhalation every 12 hours  busPIRone 10 milliGRAM(s) Oral two times a day  cholecalciferol 2000 Unit(s) Oral at bedtime  cyanocobalamin 1000 MICROGram(s) Oral at bedtime  diltiazem    milliGRAM(s) Oral daily  doxazosin 1 milliGRAM(s) Oral at bedtime  enoxaparin Injectable 40 milliGRAM(s) SubCutaneous every 24 hours  furosemide   Injectable 40 milliGRAM(s) IV Push two times a day  gabapentin 400 milliGRAM(s) Oral three times a day  hemorrhoidal Ointment 1 Application(s) Rectal two times a day  melatonin 5 milliGRAM(s) Oral at bedtime  methylPREDNISolone sodium succinate Injectable 40 milliGRAM(s) IV Push every 12 hours  metoprolol tartrate 25 milliGRAM(s) Oral two times a day  pantoprazole    Tablet 40 milliGRAM(s) Oral before breakfast  QUEtiapine 100 milliGRAM(s) Oral at bedtime  spironolactone 25 milliGRAM(s) Oral daily  thiamine 100 milliGRAM(s) Oral at bedtime      MEDICATIONS  (PRN):  acetaminophen     Tablet .. 650 milliGRAM(s) Oral every 6 hours PRN Temp greater or equal to 38C (100.4F), Mild Pain (1 - 3)  aluminum hydroxide/magnesium hydroxide/simethicone Suspension 30 milliLiter(s) Oral every 4 hours PRN Dyspepsia  metoprolol tartrate Injectable 5 milliGRAM(s) IV Push every 6 hours PRN HR>130  ondansetron Injectable 4 milliGRAM(s) IV Push every 8 hours PRN Nausea and/or Vomiting    Vital Signs Last 24 Hrs  T(C): 36.4 (01 Jun 2023 08:12), Max: 36.8 (31 May 2023 18:27)  T(F): 97.5 (01 Jun 2023 08:12), Max: 98.2 (31 May 2023 18:27)  HR: 93 (01 Jun 2023 08:55) (63 - 98)  BP: 114/65 (01 Jun 2023 08:12) (104/57 - 116/64)  BP(mean): 69 (31 May 2023 13:30) (69 - 69)  RR: 18 (01 Jun 2023 08:12) (18 - 18)  SpO2: 95% (01 Jun 2023 08:55) (93% - 97%)    Parameters below as of 01 Jun 2023 08:12  Patient On (Oxygen Delivery Method): nasal cannula      PHYSICAL EXAM:    Constitutional: NAD, awake and alert, well-developed  HEENT: PERR, EOMI,  No oral cyananosis.  Neck:  supple,  No JVD  Respiratory: exp wheezing noted   Cardiovascular: S1 and S2,irreg rhythm, no Murmurs, gallops or rubs  Gastrointestinal: Bowel Sounds present, soft, nontender.   Extremities: L BKA, right no peripheral edema. No clubbing or cyanosis,  2+ peripheral pulses  Neurological: A/O x 3, no focal deficits  Musculoskeletal: no calf tenderness.  Skin: No rashes.      LABS: All Labs Reviewed:      06-01    132<L>  |  90<L>  |  28<H>  ----------------------------<  161<H>  4.4   |  41<H>  |  0.89    Ca    9.0      01 Jun 2023 09:28  Mg     2.1     05-31      - TroponinI hsT: <-8.42, <-7.18, <-7.58      RADIOLOGY/EKG: rviewed    < from: 12 Lead ECG (05.28.23 @ 07:28) >    Ventricular Rate 123 BPM    QRS Duration 86 ms    Q-T Interval 294 ms    QTC Calculation(Bazett) 420 ms    R Axis 72 degrees    T Axis 53 degrees    Diagnosis Line Atrial fibrillation with rapid ventricular response with premature ventricular or aberrantly conducted complexes  Anteroseptal infarct (cited on or before 17-AUG-2017)  Abnormal ECG  When compared with ECG of 28-MAY-2023 07:27,  No significant change was found  Confirmed by Palla MD, Omar [id number]) on 5/28/2023 10:40:24 PM    < end of copied text >

## 2023-06-01 NOTE — PROVIDER CONTACT NOTE (OTHER) - SITUATION
pt had ICU consult from 5/28 r/t abnormal ABG pO2 49 and pCO2 64. no note was written and wanted clarification about what had happened and if ICU PA had heard of the patient
Upon RN assessment, right lower extremity leg is red and warm with +3 pitting edema.

## 2023-06-01 NOTE — PROGRESS NOTE ADULT - PROBLEM SELECTOR PLAN 1
Pt without complaints of chest pain. SOBlikely multifactorial (HFpEF, parainfluenza, VIJAY, obesity, COPD).  Weight is up.  BNP trending up.  Continue IV lasix.  Continue GDMT with BB/MRA, daily weight, fluid restriction 1.5L/day,  will followup Echo Pt without complaints of chest pain. SOB likely multifactorial (HFpEF, parainfluenza, VIJAY, obesity, COPD).  Weight is up.  BNP trending up.  Continue IV lasix.  Continue GDMT with BB/MRA, daily weight, fluid restriction 1.5L/day,  will followup Echo Pt without complaints of chest pain. SOB likely multifactorial (HFpEF, parainfluenza, VIJAY, obesity, COPD).  Weight is up.  BNP trending up.  Continue IV lasix.  Continue GDMT with BB/MRA, daily weight, fluid restriction 1.5L/day,  Echo with preserved LVEF 60%, moderate MR/TR, mild LVH, no effusions     pt. is stable from cardiac standpoint, will sign off

## 2023-06-02 LAB
ANION GAP SERPL CALC-SCNC: 4 MMOL/L — LOW (ref 5–17)
BUN SERPL-MCNC: 31 MG/DL — HIGH (ref 7–23)
CALCIUM SERPL-MCNC: 8.8 MG/DL — SIGNIFICANT CHANGE UP (ref 8.5–10.1)
CHLORIDE SERPL-SCNC: 89 MMOL/L — LOW (ref 96–108)
CO2 SERPL-SCNC: 38 MMOL/L — HIGH (ref 22–31)
CREAT SERPL-MCNC: 0.91 MG/DL — SIGNIFICANT CHANGE UP (ref 0.5–1.3)
EGFR: 93 ML/MIN/1.73M2 — SIGNIFICANT CHANGE UP
GLUCOSE SERPL-MCNC: 124 MG/DL — HIGH (ref 70–99)
POTASSIUM SERPL-MCNC: 4 MMOL/L — SIGNIFICANT CHANGE UP (ref 3.5–5.3)
POTASSIUM SERPL-SCNC: 4 MMOL/L — SIGNIFICANT CHANGE UP (ref 3.5–5.3)
SODIUM SERPL-SCNC: 131 MMOL/L — LOW (ref 135–145)

## 2023-06-02 PROCEDURE — 71045 X-RAY EXAM CHEST 1 VIEW: CPT | Mod: 26

## 2023-06-02 PROCEDURE — 99232 SBSQ HOSP IP/OBS MODERATE 35: CPT

## 2023-06-02 PROCEDURE — 99233 SBSQ HOSP IP/OBS HIGH 50: CPT

## 2023-06-02 RX ADMIN — GABAPENTIN 400 MILLIGRAM(S): 400 CAPSULE ORAL at 15:13

## 2023-06-02 RX ADMIN — Medication 3 MILLILITER(S): at 09:18

## 2023-06-02 RX ADMIN — Medication 3 MILLILITER(S): at 22:32

## 2023-06-02 RX ADMIN — Medication 40 MILLIGRAM(S): at 18:14

## 2023-06-02 RX ADMIN — Medication 0.5 MILLIGRAM(S): at 09:18

## 2023-06-02 RX ADMIN — PREGABALIN 1000 MICROGRAM(S): 225 CAPSULE ORAL at 21:42

## 2023-06-02 RX ADMIN — Medication 0.5 MILLIGRAM(S): at 22:32

## 2023-06-02 RX ADMIN — Medication 2000 UNIT(S): at 21:42

## 2023-06-02 RX ADMIN — GABAPENTIN 400 MILLIGRAM(S): 400 CAPSULE ORAL at 21:49

## 2023-06-02 RX ADMIN — Medication 650 MILLIGRAM(S): at 18:14

## 2023-06-02 RX ADMIN — Medication 40 MILLIGRAM(S): at 06:02

## 2023-06-02 RX ADMIN — Medication 100 MILLIGRAM(S): at 21:41

## 2023-06-02 RX ADMIN — Medication 180 MILLIGRAM(S): at 09:07

## 2023-06-02 RX ADMIN — Medication 25 MILLIGRAM(S): at 09:07

## 2023-06-02 RX ADMIN — Medication 5 MILLIGRAM(S): at 09:07

## 2023-06-02 RX ADMIN — Medication 5 MILLIGRAM(S): at 21:42

## 2023-06-02 RX ADMIN — SPIRONOLACTONE 25 MILLIGRAM(S): 25 TABLET, FILM COATED ORAL at 09:07

## 2023-06-02 RX ADMIN — QUETIAPINE FUMARATE 100 MILLIGRAM(S): 200 TABLET, FILM COATED ORAL at 21:42

## 2023-06-02 RX ADMIN — Medication 10 MILLIGRAM(S): at 21:43

## 2023-06-02 RX ADMIN — Medication 3 MILLILITER(S): at 13:52

## 2023-06-02 RX ADMIN — Medication 650 MILLIGRAM(S): at 09:08

## 2023-06-02 RX ADMIN — Medication 25 MILLIGRAM(S): at 21:50

## 2023-06-02 RX ADMIN — GABAPENTIN 400 MILLIGRAM(S): 400 CAPSULE ORAL at 06:02

## 2023-06-02 RX ADMIN — Medication 1 MILLIGRAM(S): at 21:50

## 2023-06-02 RX ADMIN — Medication 40 MILLIGRAM(S): at 09:06

## 2023-06-02 RX ADMIN — Medication 5 MILLIGRAM(S): at 21:41

## 2023-06-02 RX ADMIN — PANTOPRAZOLE SODIUM 40 MILLIGRAM(S): 20 TABLET, DELAYED RELEASE ORAL at 06:07

## 2023-06-02 RX ADMIN — Medication 81 MILLIGRAM(S): at 09:07

## 2023-06-02 RX ADMIN — PHENYLEPHRINE-SHARK LIVER OIL-MINERAL OIL-PETROLATUM RECTAL OINTMENT 1 APPLICATION(S): at 06:04

## 2023-06-02 RX ADMIN — ENOXAPARIN SODIUM 40 MILLIGRAM(S): 100 INJECTION SUBCUTANEOUS at 21:41

## 2023-06-02 RX ADMIN — Medication 10 MILLIGRAM(S): at 09:07

## 2023-06-02 NOTE — PROGRESS NOTE ADULT - SUBJECTIVE AND OBJECTIVE BOX
REASON FOR VISIT: AF, CHF, Rx management      HPI:  67 y/o male with PMH of HFpEF, parox atrial fibrillation, Watchman Implant, MR , COPD on 4 L of home O2,  ETOH induced cirrhosis,  HTN, obesity, VIJAY (noncompliant w/ CPAP), alcoholism, pulmonary HTN,  Left BKA  presents to  with 1 day history of difficulty breathing, productive cough, wheezing.  Patient states that he has had worsening shortness of breath since yesterday and this evening he had much worsening of symptoms.  Brought in by EMS who stated he was very short of breath and oxygen was in the high 80s/low 90s upon arrival.  Improved with supplemental oxygen and albuterol nebulizer treatment. Patient also reports episode of rectal bleeding episode at home.     in ED - Vital sings  T  Max: 37.1, T(F) Max: 98.7 , HR: 106  (106 - 110), BP: 128/66 (124/56 - 128/66) RR: 16  (16 - 30) SpO2: 97%  (86% - 100%) EKG  A fib 112, Lactate 2.2--> 1.4, Mg 1.3, troponin 7.5 , , venous blood gas Ph 7.34 Parainfluenza positive, CXR - mild pulmonary edema, s/p , tylenol, duoneb, lasix 80 s/p BIPAP     5/29/23: Pt feels improved.   5/30/23: Pt denies chest pain.  Still has shortness of breath.  Tele: afib rate controlled.  5/31/23: C/O SOB, Tele: Afib   6/1/23: less SOB, took off his monitor   6/2/23: feels samm, off tele     MEDICATIONS  (STANDING):  albuterol/ipratropium for Nebulization 3 milliLiter(s) Nebulizer every 6 hours  aspirin  chewable 81 milliGRAM(s) Oral daily  bisacodyl 5 milliGRAM(s) Oral every 12 hours  buDESOnide    Inhalation Suspension 0.5 milliGRAM(s) Inhalation every 12 hours  busPIRone 10 milliGRAM(s) Oral two times a day  cholecalciferol 2000 Unit(s) Oral at bedtime  cyanocobalamin 1000 MICROGram(s) Oral at bedtime  diltiazem    milliGRAM(s) Oral daily  doxazosin 1 milliGRAM(s) Oral at bedtime  enoxaparin Injectable 40 milliGRAM(s) SubCutaneous every 24 hours  furosemide   Injectable 40 milliGRAM(s) IV Push two times a day  gabapentin 400 milliGRAM(s) Oral three times a day  hemorrhoidal Ointment 1 Application(s) Rectal two times a day  melatonin 5 milliGRAM(s) Oral at bedtime  methylPREDNISolone sodium succinate Injectable 40 milliGRAM(s) IV Push every 12 hours  metoprolol tartrate 25 milliGRAM(s) Oral two times a day  pantoprazole    Tablet 40 milliGRAM(s) Oral before breakfast  QUEtiapine 100 milliGRAM(s) Oral at bedtime  spironolactone 25 milliGRAM(s) Oral daily  thiamine 100 milliGRAM(s) Oral at bedtime    MEDICATIONS  (PRN):  acetaminophen     Tablet .. 650 milliGRAM(s) Oral every 6 hours PRN Temp greater or equal to 38C (100.4F), Mild Pain (1 - 3)  aluminum hydroxide/magnesium hydroxide/simethicone Suspension 30 milliLiter(s) Oral every 4 hours PRN Dyspepsia  metoprolol tartrate Injectable 5 milliGRAM(s) IV Push every 6 hours PRN HR>130  ondansetron Injectable 4 milliGRAM(s) IV Push every 8 hours PRN Nausea and/or Vomiting    Vital Signs Last 24 Hrs  T(C): 36.4 (02 Jun 2023 08:04), Max: 36.4 (01 Jun 2023 22:36)  T(F): 97.5 (02 Jun 2023 08:04), Max: 97.5 (01 Jun 2023 22:36)  HR: 90 (02 Jun 2023 08:04) (84 - 102)  BP: 116/71 (02 Jun 2023 08:04) (94/51 - 116/71)  BP(mean): --  RR: 18 (02 Jun 2023 08:04) (18 - 19)  SpO2: 93% (02 Jun 2023 08:04) (90% - 93%)    Parameters below as of 02 Jun 2023 09:20  Patient On (Oxygen Delivery Method): nasal cannula    PHYSICAL EXAM:    Constitutional: NAD, awake and alert, well-developed  HEENT: PERR, EOMI,  No oral cyananosis.  Neck:  supple,  No JVD  Respiratory: exp wheezing noted   Cardiovascular: S1 and S2,irreg rhythm, no Murmurs, gallops or rubs  Gastrointestinal: Bowel Sounds present, soft, nontender.   Extremities: L BKA, right no peripheral edema. No clubbing or cyanosis,  2+ peripheral pulses  Neurological: A/O x 3, no focal deficits  Musculoskeletal: no calf tenderness.  Skin: No rashes.      LABS: All Labs Reviewed:  06-02    131<L>  |  89<L>  |  31<H>  ----------------------------<  124<H>  4.0   |  38<H>  |  0.91    Ca    8.8      02 Jun 2023 08:04      - TroponinI hsT: <-8.42, <-7.18, <-7.58  RADIOLOGY/EKG: rviewed    < from: 12 Lead ECG (05.28.23 @ 07:28) >    Ventricular Rate 123 BPM    QRS Duration 86 ms    Q-T Interval 294 ms    QTC Calculation(Bazett) 420 ms    R Axis 72 degrees    T Axis 53 degrees    Diagnosis Line Atrial fibrillation with rapid ventricular response with premature ventricular or aberrantly conducted complexes  Anteroseptal infarct (cited on or before 17-AUG-2017)  Abnormal ECG  When compared with ECG of 28-MAY-2023 07:27,  No significant change was found  Confirmed by Palla MD, Omar ([id number]) on 5/28/2023 10:40:24 PM    < end of copied text >

## 2023-06-02 NOTE — PROGRESS NOTE ADULT - SUBJECTIVE AND OBJECTIVE BOX
Subjective:    Awake, comfortable at rest. Scant sputum.  6/2: awake, no distress, on NC O2 at 5 L/min.    MEDICATIONS  (STANDING):  albuterol/ipratropium for Nebulization 3 milliLiter(s) Nebulizer every 6 hours  aspirin  chewable 81 milliGRAM(s) Oral daily  bisacodyl 5 milliGRAM(s) Oral every 12 hours  buDESOnide    Inhalation Suspension 0.5 milliGRAM(s) Inhalation every 12 hours  busPIRone 10 milliGRAM(s) Oral two times a day  cholecalciferol 2000 Unit(s) Oral at bedtime  cyanocobalamin 1000 MICROGram(s) Oral at bedtime  diltiazem    milliGRAM(s) Oral daily  doxazosin 1 milliGRAM(s) Oral at bedtime  enoxaparin Injectable 40 milliGRAM(s) SubCutaneous every 24 hours  furosemide   Injectable 40 milliGRAM(s) IV Push two times a day  gabapentin 400 milliGRAM(s) Oral three times a day  hemorrhoidal Ointment 1 Application(s) Rectal two times a day  melatonin 5 milliGRAM(s) Oral at bedtime  methylPREDNISolone sodium succinate Injectable 40 milliGRAM(s) IV Push every 12 hours  metoprolol tartrate 25 milliGRAM(s) Oral two times a day  pantoprazole    Tablet 40 milliGRAM(s) Oral before breakfast  QUEtiapine 100 milliGRAM(s) Oral at bedtime  spironolactone 25 milliGRAM(s) Oral daily  thiamine 100 milliGRAM(s) Oral at bedtime    MEDICATIONS  (PRN):  acetaminophen     Tablet .. 650 milliGRAM(s) Oral every 6 hours PRN Temp greater or equal to 38C (100.4F), Mild Pain (1 - 3)  aluminum hydroxide/magnesium hydroxide/simethicone Suspension 30 milliLiter(s) Oral every 4 hours PRN Dyspepsia  ondansetron Injectable 4 milliGRAM(s) IV Push every 8 hours PRN Nausea and/or Vomiting      Allergies    Duricef (Rash)  Ceclor (Rash)    Intolerances        Vital Signs Last 24 Hrs  T(C): 36.4 (01 Jun 2023 08:12), Max: 36.8 (31 May 2023 18:27)  T(F): 97.5 (01 Jun 2023 08:12), Max: 98.2 (31 May 2023 18:27)  HR: 93 (01 Jun 2023 08:55) (63 - 98)  BP: 114/65 (01 Jun 2023 08:12) (104/57 - 116/64)  BP(mean): 69 (31 May 2023 13:30) (69 - 69)  RR: 18 (01 Jun 2023 08:12) (18 - 18)  SpO2: 95% (01 Jun 2023 08:55) (93% - 97%)    Parameters below as of 01 Jun 2023 08:12  Patient On (Oxygen Delivery Method): nasal cannula        PHYSICAL EXAMINATION:    NECK:  Supple. No lymphadenopathy. Jugular venous pressure not elevated.   HEART:   The cardiac impulse has a normal quality. Reg., Nl S1 and S2.    CHEST:  Chest with rhonchi.  sl.  wheezes. Normal respiratory effort.  ABDOMEN:  Soft and nontender.   EXTREMITIES:  There is 2+ edema.       LABS:    06-01    132<L>  |  90<L>  |  28<H>  ----------------------------<  161<H>  4.4   |  41<H>  |  0.89    Ca    9.0      01 Jun 2023 09:28  Mg     2.1     05-31            RADIOLOGY & ADDITIONAL TESTS:    Assessment and Plan:   · Assessment	  - patient now on 5L nasal canula; no distress  - on duoneb/budesonide aerosol  - Maintain iv steroids  - noncompliant w BIPAP  - in afib w rate controlled  - on lovenox q24h; ? full dose AC    Problem/Plan - 1:  ·  Problem: Acute on chronic respiratory failure with hypoxia and hypercapnia.   Problem/Plan - 2:  ·  Problem: Infection due to parainfluenza virus 3.   Problem/Plan - 3:  ·  Problem: VIJAY and COPD overlap syndrome.   Problem/Plan - 4:  ·  Problem: Acute bronchospasm.   Problem/Plan - 5:  ·  Problem: Pulmonary hypertension.   Problem/Plan - 6:  ·  Problem: Obesity hypoventilation syndrome.   Problem/Plan - 7:  ·  Problem: ILD (interstitial lung disease).   Problem/Plan - 8:  ·  Problem: Nodule of upper lobe of left lung.

## 2023-06-02 NOTE — PROGRESS NOTE ADULT - SUBJECTIVE AND OBJECTIVE BOX
HOSPITALIST ATTENDING PROGRESS NOTE    Chart and meds reviewed.  Patient seen and examined.    CC: SOB    Subjective: Feels that breathing is not improving. Denies CP. Some cough.     All other systems reviewed and found to be negative with the exception of what has been described above.    MEDICATIONS  (STANDING):  albuterol/ipratropium for Nebulization 3 milliLiter(s) Nebulizer every 6 hours  aspirin  chewable 81 milliGRAM(s) Oral daily  bisacodyl 5 milliGRAM(s) Oral every 12 hours  buDESOnide    Inhalation Suspension 0.5 milliGRAM(s) Inhalation every 12 hours  busPIRone 10 milliGRAM(s) Oral two times a day  cholecalciferol 2000 Unit(s) Oral at bedtime  cyanocobalamin 1000 MICROGram(s) Oral at bedtime  diltiazem    milliGRAM(s) Oral daily  doxazosin 1 milliGRAM(s) Oral at bedtime  enoxaparin Injectable 40 milliGRAM(s) SubCutaneous every 24 hours  furosemide   Injectable 40 milliGRAM(s) IV Push two times a day  gabapentin 400 milliGRAM(s) Oral three times a day  hemorrhoidal Ointment 1 Application(s) Rectal two times a day  melatonin 5 milliGRAM(s) Oral at bedtime  methylPREDNISolone sodium succinate Injectable 40 milliGRAM(s) IV Push every 12 hours  metoprolol tartrate 25 milliGRAM(s) Oral two times a day  pantoprazole    Tablet 40 milliGRAM(s) Oral before breakfast  QUEtiapine 100 milliGRAM(s) Oral at bedtime  spironolactone 25 milliGRAM(s) Oral daily  thiamine 100 milliGRAM(s) Oral at bedtime    MEDICATIONS  (PRN):  acetaminophen     Tablet .. 650 milliGRAM(s) Oral every 6 hours PRN Temp greater or equal to 38C (100.4F), Mild Pain (1 - 3)  aluminum hydroxide/magnesium hydroxide/simethicone Suspension 30 milliLiter(s) Oral every 4 hours PRN Dyspepsia  ondansetron Injectable 4 milliGRAM(s) IV Push every 8 hours PRN Nausea and/or Vomiting      VITALS:  T(F): 97.5 (06-02-23 @ 08:04), Max: 97.5 (06-01-23 @ 22:36)  HR: 90 (06-02-23 @ 08:04) (84 - 102)  BP: 116/71 (06-02-23 @ 08:04) (94/51 - 116/71)  RR: 18 (06-02-23 @ 08:04) (18 - 19)  SpO2: 93% (06-02-23 @ 08:04) (90% - 93%)  Wt(kg): --    I&O's Summary    01 Jun 2023 07:01  -  02 Jun 2023 07:00  --------------------------------------------------------  IN: 0 mL / OUT: 1000 mL / NET: -1000 mL        CAPILLARY BLOOD GLUCOSE          PHYSICAL EXAM:  Gen: NAD  HEENT:  pupils equal and reactive, EOMI, no oropharyngeal lesions, erythema, exudates, oral thrush  NECK:   supple, no carotid bruits, no palpable lymph nodes, no thyromegaly  CV:  +S1, +S2, regular, no murmurs or rubs  RESP:   fair air mvnt, b/l wheezing and crackles  BREAST:  not examined  GI:  abdomen soft, non-tender, non-distended, normal BS, no bruits, no abdominal masses, no palpable masses  RECTAL:  not examined  :  not examined  MSK:   normal muscle tone, no atrophy, no rigidity, no contractions  EXT:  no clubbing, no cyanosis, no edema, no calf pain, swelling or erythema  VASCULAR:  pulses equal and symmetric in the upper and lower extremities  NEURO:  AAOX3, no focal neurological deficits, follows all commands, able to move extremities spontaneously  SKIN:  no ulcers, lesions or rashes    LABS:        06-02    131<L>  |  89<L>  |  31<H>  ----------------------------<  124<H>  4.0   |  38<H>  |  0.91    Ca    8.8      02 Jun 2023 08:04    CULTURES:  Urine culture 5/27: Negative  Blood culture 5/27: Negative    Imaging:  XR ankle R 5/27: no fracture, dislocation, soft tissue swelling or joint effusion. Degenerative changes at the ankle joint. Small plantar and retrocalcaneal spurs. Degenerative changes of the talonavicular joint. No radiopaque foreign body.    US Duplex Venous Lower Ext Complete, Bilateral 5/27: No evidence of deep venous thrombosis in either lower extremity.    CT Abdomen and Pelvis WO 5/27: Cirrhotic liver with mild splenomegaly. No ascites. Mildly enlarged 3.1cm splenic hypodense low-attenuation lesion of unclear etiology, possibly a cyst. Consider abdominal MRI for further evaluation of these findings. No bowel distention seen.    CT Chest WO 5/27: 1. New 9 mm left upper lobe nodular opacity and nonspecific dependent groundglass may be infectious or inflammatory. Additional peripheral reticular opacities likely reflect component of chronic interstitial lung disease. Slightly enlarged 5 mm right lower lobe pulmonary nodule since 2020, of unclear significance. Follow-up chest CT is recommended in 6 weeks to evaluate for stability versus resolution of these findings. Cardiac enlargement, multivessel coronary artery calcification, and Watchman device noted. Significantly enlarged main pulmonary artery likely reflects pulmonary arterial hypertension.    Cardiac Testing:  EKG 5/27: Afib with RVR    Echo 5/30/23: EF 60%    Telemetry, personally reviewed:  5/31/23: afib , d/c tele

## 2023-06-02 NOTE — PROGRESS NOTE ADULT - ASSESSMENT
67 y/o man with obesity, VIJAY non-adherent to CPAP, HTN, HFpEF, permanent afib s/p Watchman, mitral regurgitation, peripheral vascular disease s/p L BKA, COPD, pulmonary HTN, chronic resp failure on home O2 4L/min, alcoholic cirrhosis, presented with difficulty breathing, productive cough, wheezing. In the ED, he was afebrile, BP WNL, HR 100s-110s, SPO2 97% on 4L. Exam notable for increased resp effort, B/L wheeze. Labs with negative troponin, , lactate 2.2. EKG afib RVR. CXR with mild pulm edema. RVP PCR panel (+) for parainfluenza infection. He was given aspirin, acetaminophen, Lasix IV, Solumedrol and Duoneb, placed on NIPPV. He subsequently improved to a modest degree and was admitted to Medicine thereafter.     #Acute on chronic hypoxic and hypercapneic respiratory failure  -Multifactorial due to parainfluenza infection, unable to rule out viral pneumonia, present upon admission, VIJAY non-adherent to NIPPV, COPD with exacerbation, pulmonary HTN, and possibly acute on chronic diastolic CHF.   - Continue to treat underlying issues  - O2 supplementation (on continuous home O2)  - NIPPV with BIPAP at night    #Parainfluenza infection, unable to rule out viral pneumonia, present upon admission  -Still with dyspnea and cough but gradually improving  - Continue supportive care measures    #VIJAY  -Stable  - Continue to encourage NIPPV use    #COPD with exacerbation  -Wheeze on exam. No resp distress.   - Continue Solumedrol, taper as able , budesonide neb q12, Duoneb q6  -azithro completed    #Acute on chronic diastolic CHF  -No angina. Troponin negative. ProBNP elevated to 1134. Initial EKG afib RVR which may have contributed to the CHF exacerbation.  - TTE done, report in paper chart  - Continue IV Lasix 40mg BID, spironolactone 25mg daily  -s/p dose metolazone 5/30  - Continue metoprolol   - f/u cards recs (Palla)    #Permanent afib, RVR  -HR suboptimally controlled in setting of parainfluenza infection. On metoprolol and diltizem CD. HR now somewhat improved, 80s-90s.  - Continue metoprolol and diltiazem CD  - Not on AC as patient has hx of Watchman placement  -ASA    #Pulmonary nodules in MICHELLE and RLL  -As noted on CT, repeat CT 6 wks    #Alcoholic cirrhosis  No encephalopathy. No ascites. Unknown as to presence of varices.   - Continue diuretics  -w splenic hypodensity, obtain nonemergent MRI to further eval as outpt    #Normocytic anemia  -Did have an episode of rectal bleeding prior to admission, suspected to be hemorrhoidal. He does have hx of R ischemic colitis in past and underlying cirrhosis / alcohol use disorder that could also be responsible for his chronic anemia. -No overt bleeding at this time.  - Continue to monitor,   - Per Dr Barker, outpatient EGD and colonoscopy once respiratory status improving     #Hypomagnesemia  -Improved with supplementation    #Vit D deficiency  -25-OH vit D 17 ng/mL.    - Ordered for Vit D replacement    #DVT ppx- SQL    DVT px: LMWH  Code status: Full    Updated daughter, [phone], on 5/31.  Pt medically active, on iv diuresis, iv steroids. D/c >48hrs

## 2023-06-02 NOTE — PROGRESS NOTE ADULT - NS ATTEND AMEND GEN_ALL_CORE FT
Plan of care discussed with NP. Agree with plan of care.
agree w/ above
Case discussed with NP; echocardiogram reviewed:  Mild LV dilation with normal function (EF 60%), LAE, AV sclerosis, moderate MR, mild-mod TR.
Patient with above shortness of breath  likely multifactorial origin , diastolic heart failure , increasing BNP , add metolazone 2.5 mg po daily . monitor renal function

## 2023-06-02 NOTE — PROGRESS NOTE ADULT - PROBLEM SELECTOR PLAN 1
Pt without complaints of chest pain. SOB likely multifactorial (HFpEF, parainfluenza, VIJAY, obesity, COPD).  Weight is up.  BNP trending up.  Continue IV lasix.  Continue GDMT with BB/MRA, daily weight, fluid restriction 1.5L/day,  Echo with preserved LVEF 60%, moderate MR/TR, mild LVH, no effusions     pt. is stable from cardiac standpoint, will sign off Pt without complaints of chest pain. SOB likely multifactorial (HFpEF, parainfluenza, VIJAY, obesity, COPD).  Weight is up.  BNP trending up.  Continue IV lasix.  Continue GDMT with BB/MRA, daily weight, fluid restriction 1.5L/day,  Echo with preserved LVEF 60%, moderate MR/TR, mild LVH, no effusions     pt. is stable from cardiac standpoint, will followup prn

## 2023-06-03 LAB
ANION GAP SERPL CALC-SCNC: 2 MMOL/L — LOW (ref 5–17)
BUN SERPL-MCNC: 30 MG/DL — HIGH (ref 7–23)
CALCIUM SERPL-MCNC: 8.7 MG/DL — SIGNIFICANT CHANGE UP (ref 8.5–10.1)
CHLORIDE SERPL-SCNC: 88 MMOL/L — LOW (ref 96–108)
CO2 SERPL-SCNC: 38 MMOL/L — HIGH (ref 22–31)
CREAT SERPL-MCNC: 0.84 MG/DL — SIGNIFICANT CHANGE UP (ref 0.5–1.3)
EGFR: 96 ML/MIN/1.73M2 — SIGNIFICANT CHANGE UP
GLUCOSE SERPL-MCNC: 140 MG/DL — HIGH (ref 70–99)
POTASSIUM SERPL-MCNC: 3.9 MMOL/L — SIGNIFICANT CHANGE UP (ref 3.5–5.3)
POTASSIUM SERPL-SCNC: 3.9 MMOL/L — SIGNIFICANT CHANGE UP (ref 3.5–5.3)
SODIUM SERPL-SCNC: 128 MMOL/L — LOW (ref 135–145)

## 2023-06-03 PROCEDURE — 71250 CT THORAX DX C-: CPT | Mod: 26

## 2023-06-03 PROCEDURE — 99232 SBSQ HOSP IP/OBS MODERATE 35: CPT

## 2023-06-03 RX ORDER — CEFEPIME 1 G/1
1000 INJECTION, POWDER, FOR SOLUTION INTRAMUSCULAR; INTRAVENOUS EVERY 12 HOURS
Refills: 0 | Status: DISCONTINUED | OUTPATIENT
Start: 2023-06-03 | End: 2023-06-03

## 2023-06-03 RX ORDER — TIOTROPIUM BROMIDE 18 UG/1
2 CAPSULE ORAL; RESPIRATORY (INHALATION) DAILY
Refills: 0 | Status: DISCONTINUED | OUTPATIENT
Start: 2023-06-03 | End: 2023-06-05

## 2023-06-03 RX ORDER — CEFEPIME 1 G/1
1000 INJECTION, POWDER, FOR SOLUTION INTRAMUSCULAR; INTRAVENOUS EVERY 12 HOURS
Refills: 0 | Status: DISCONTINUED | OUTPATIENT
Start: 2023-06-04 | End: 2023-06-08

## 2023-06-03 RX ORDER — CEFEPIME 1 G/1
1000 INJECTION, POWDER, FOR SOLUTION INTRAMUSCULAR; INTRAVENOUS ONCE
Refills: 0 | Status: COMPLETED | OUTPATIENT
Start: 2023-06-03 | End: 2023-06-03

## 2023-06-03 RX ORDER — CEFEPIME 1 G/1
INJECTION, POWDER, FOR SOLUTION INTRAMUSCULAR; INTRAVENOUS
Refills: 0 | Status: DISCONTINUED | OUTPATIENT
Start: 2023-06-03 | End: 2023-06-08

## 2023-06-03 RX ORDER — CEFEPIME 1 G/1
INJECTION, POWDER, FOR SOLUTION INTRAMUSCULAR; INTRAVENOUS
Refills: 0 | Status: DISCONTINUED | OUTPATIENT
Start: 2023-06-03 | End: 2023-06-03

## 2023-06-03 RX ORDER — FUROSEMIDE 40 MG
40 TABLET ORAL DAILY
Refills: 0 | Status: DISCONTINUED | OUTPATIENT
Start: 2023-06-03 | End: 2023-06-09

## 2023-06-03 RX ORDER — ALBUTEROL 90 UG/1
2.5 AEROSOL, METERED ORAL
Refills: 0 | Status: DISCONTINUED | OUTPATIENT
Start: 2023-06-03 | End: 2023-06-09

## 2023-06-03 RX ORDER — ALBUTEROL 90 UG/1
2.5 AEROSOL, METERED ORAL
Refills: 0 | Status: DISCONTINUED | OUTPATIENT
Start: 2023-06-03 | End: 2023-06-03

## 2023-06-03 RX ORDER — BUDESONIDE AND FORMOTEROL FUMARATE DIHYDRATE 160; 4.5 UG/1; UG/1
2 AEROSOL RESPIRATORY (INHALATION)
Refills: 0 | Status: DISCONTINUED | OUTPATIENT
Start: 2023-06-03 | End: 2023-06-05

## 2023-06-03 RX ADMIN — CEFEPIME 1000 MILLIGRAM(S): 1 INJECTION, POWDER, FOR SOLUTION INTRAMUSCULAR; INTRAVENOUS at 22:12

## 2023-06-03 RX ADMIN — Medication 40 MILLIGRAM(S): at 22:12

## 2023-06-03 RX ADMIN — SPIRONOLACTONE 25 MILLIGRAM(S): 25 TABLET, FILM COATED ORAL at 09:12

## 2023-06-03 RX ADMIN — QUETIAPINE FUMARATE 100 MILLIGRAM(S): 200 TABLET, FILM COATED ORAL at 22:13

## 2023-06-03 RX ADMIN — Medication 40 MILLIGRAM(S): at 05:11

## 2023-06-03 RX ADMIN — GABAPENTIN 400 MILLIGRAM(S): 400 CAPSULE ORAL at 22:13

## 2023-06-03 RX ADMIN — Medication 5 MILLIGRAM(S): at 22:13

## 2023-06-03 RX ADMIN — PREGABALIN 1000 MICROGRAM(S): 225 CAPSULE ORAL at 22:13

## 2023-06-03 RX ADMIN — GABAPENTIN 400 MILLIGRAM(S): 400 CAPSULE ORAL at 14:04

## 2023-06-03 RX ADMIN — Medication 3 MILLILITER(S): at 08:31

## 2023-06-03 RX ADMIN — PHENYLEPHRINE-SHARK LIVER OIL-MINERAL OIL-PETROLATUM RECTAL OINTMENT 1 APPLICATION(S): at 05:12

## 2023-06-03 RX ADMIN — Medication 10 MILLIGRAM(S): at 09:13

## 2023-06-03 RX ADMIN — Medication 0.5 MILLIGRAM(S): at 08:31

## 2023-06-03 RX ADMIN — Medication 650 MILLIGRAM(S): at 09:52

## 2023-06-03 RX ADMIN — ALBUTEROL 2.5 MILLIGRAM(S): 90 AEROSOL, METERED ORAL at 21:34

## 2023-06-03 RX ADMIN — Medication 81 MILLIGRAM(S): at 09:13

## 2023-06-03 RX ADMIN — Medication 10 MILLIGRAM(S): at 22:12

## 2023-06-03 RX ADMIN — Medication 25 MILLIGRAM(S): at 09:13

## 2023-06-03 RX ADMIN — Medication 2000 UNIT(S): at 22:12

## 2023-06-03 RX ADMIN — Medication 1 MILLIGRAM(S): at 22:14

## 2023-06-03 RX ADMIN — Medication 3 MILLILITER(S): at 14:23

## 2023-06-03 RX ADMIN — Medication 650 MILLIGRAM(S): at 09:15

## 2023-06-03 RX ADMIN — Medication 5 MILLIGRAM(S): at 09:13

## 2023-06-03 RX ADMIN — Medication 650 MILLIGRAM(S): at 23:11

## 2023-06-03 RX ADMIN — PANTOPRAZOLE SODIUM 40 MILLIGRAM(S): 20 TABLET, DELAYED RELEASE ORAL at 05:11

## 2023-06-03 RX ADMIN — Medication 40 MILLIGRAM(S): at 15:18

## 2023-06-03 RX ADMIN — Medication 25 MILLIGRAM(S): at 22:13

## 2023-06-03 RX ADMIN — Medication 180 MILLIGRAM(S): at 09:13

## 2023-06-03 RX ADMIN — Medication 5 MILLIGRAM(S): at 22:12

## 2023-06-03 RX ADMIN — Medication 40 MILLIGRAM(S): at 15:20

## 2023-06-03 RX ADMIN — Medication 650 MILLIGRAM(S): at 22:11

## 2023-06-03 RX ADMIN — Medication 100 MILLIGRAM(S): at 22:13

## 2023-06-03 RX ADMIN — ENOXAPARIN SODIUM 40 MILLIGRAM(S): 100 INJECTION SUBCUTANEOUS at 22:14

## 2023-06-03 RX ADMIN — GABAPENTIN 400 MILLIGRAM(S): 400 CAPSULE ORAL at 05:12

## 2023-06-03 NOTE — PROGRESS NOTE ADULT - ASSESSMENT
69 y/o man with obesity, VIJAY non-adherent to CPAP, HTN, HFpEF, permanent afib s/p Watchman, mitral regurgitation, peripheral vascular disease s/p L BKA, COPD, pulmonary HTN, chronic resp failure on home O2 4L/min, alcoholic cirrhosis, presented with difficulty breathing, productive cough, wheezing. In the ED, he was afebrile, BP WNL, HR 100s-110s, SPO2 97% on 4L. Exam notable for increased resp effort, B/L wheeze. Labs with negative troponin, , lactate 2.2. EKG afib RVR. CXR with mild pulm edema. RVP PCR panel (+) for parainfluenza infection. He was given aspirin, acetaminophen, Lasix IV, Solumedrol and Duoneb, placed on NIPPV. He subsequently improved to a modest degree and was admitted to Medicine thereafter.     #Acute on chronic hypoxic and hypercapneic respiratory failure  -Multifactorial due to parainfluenza infection, unable to rule out viral pneumonia, present upon admission, VIJAY non-adherent to NIPPV, COPD with exacerbation, pulmonary HTN, and possibly acute on chronic diastolic CHF.   - Continue to treat underlying issues  - O2 supplementation (on continuous home O2)  - NIPPV with BIPAP at night    #Parainfluenza infection, unable to rule out viral pneumonia, present upon admission  -Still with dyspnea and cough but gradually improving  - Continue supportive care measures    #VIJAY  -Stable  - Continue to encourage NIPPV use    #COPD with exacerbation  -Wheeze on exam. No resp distress.   -check CT chest given lack of improvement  - Continue Solumedrol, increase to q8, symbicort, spiriva, standing nebs  -azithro completed    #Acute on chronic diastolic CHF  -No angina. Troponin negative. ProBNP elevated to 1134. Initial EKG afib RVR which may have contributed to the CHF exacerbation.  - TTE done, report in paper chart  - Continue IV Lasix 40mg, now reduced to qd, spironolactone 25mg daily  -s/p dose metolazone 5/30  - Continue metoprolol   - f/u cards recs (Palla)    #Permanent afib, RVR  -HR suboptimally controlled in setting of parainfluenza infection. On metoprolol and diltizem CD. HR now somewhat improved, 80s-90s.  - Continue metoprolol and diltiazem CD  - Not on AC as patient has hx of Watchman placement  -ASA    #Pulmonary nodules in MICHELLE and RLL  -As noted on CT, repeat CT 6 wks    #Alcoholic cirrhosis  No encephalopathy. No ascites. Unknown as to presence of varices.   - Continue diuretics  -w splenic hypodensity, obtain nonemergent MRI to further eval as outpt    #Normocytic anemia  -Did have an episode of rectal bleeding prior to admission, suspected to be hemorrhoidal. He does have hx of R ischemic colitis in past and underlying cirrhosis / alcohol use disorder that could also be responsible for his chronic anemia. -No overt bleeding at this time.  - Continue to monitor,   - Per Dr Barker, outpatient EGD and colonoscopy once respiratory status improving     #Hypomagnesemia  -Improved with supplementation    #Vit D deficiency  -25-OH vit D 17 ng/mL.    - Ordered for Vit D replacement    #DVT ppx- SQL    DVT px: LMWH  Code status: Full    Updated daughter, [phone], on 5/31.  Pt medically active, on iv diuresis, iv steroids. D/c >48hrs

## 2023-06-03 NOTE — PROGRESS NOTE ADULT - SUBJECTIVE AND OBJECTIVE BOX
HOSPITALIST ATTENDING PROGRESS NOTE    Chart and meds reviewed.  Patient seen and examined.    CC: SOB    Subjective: Breathing poor today, chest tightness, cough. CXR w decreased CHF yesterday however on exam w diffuse crackles/wheezing. Increase steroids, check CT chest noncon.    All other systems reviewed and found to be negative with the exception of what has been described above.    MEDICATIONS  (STANDING):  albuterol   0.5% 2.5 milliGRAM(s) Nebulizer four times a day  aspirin  chewable 81 milliGRAM(s) Oral daily  bisacodyl 5 milliGRAM(s) Oral every 12 hours  budesonide 160 MICROgram(s)/formoterol 4.5 MICROgram(s) Inhaler 2 Puff(s) Inhalation two times a day  busPIRone 10 milliGRAM(s) Oral two times a day  cholecalciferol 2000 Unit(s) Oral at bedtime  cyanocobalamin 1000 MICROGram(s) Oral at bedtime  diltiazem    milliGRAM(s) Oral daily  doxazosin 1 milliGRAM(s) Oral at bedtime  enoxaparin Injectable 40 milliGRAM(s) SubCutaneous every 24 hours  furosemide   Injectable 40 milliGRAM(s) IV Push daily  gabapentin 400 milliGRAM(s) Oral three times a day  hemorrhoidal Ointment 1 Application(s) Rectal two times a day  melatonin 5 milliGRAM(s) Oral at bedtime  methylPREDNISolone sodium succinate Injectable 40 milliGRAM(s) IV Push every 8 hours  metoprolol tartrate 25 milliGRAM(s) Oral two times a day  pantoprazole    Tablet 40 milliGRAM(s) Oral before breakfast  QUEtiapine 100 milliGRAM(s) Oral at bedtime  spironolactone 25 milliGRAM(s) Oral daily  thiamine 100 milliGRAM(s) Oral at bedtime  tiotropium 2.5 MICROgram(s) Inhaler 2 Puff(s) Inhalation daily    MEDICATIONS  (PRN):  acetaminophen     Tablet .. 650 milliGRAM(s) Oral every 6 hours PRN Temp greater or equal to 38C (100.4F), Mild Pain (1 - 3)  aluminum hydroxide/magnesium hydroxide/simethicone Suspension 30 milliLiter(s) Oral every 4 hours PRN Dyspepsia  ondansetron Injectable 4 milliGRAM(s) IV Push every 8 hours PRN Nausea and/or Vomiting      VITALS:  T(F): 97.9 (06-03-23 @ 08:00), Max: 97.9 (06-03-23 @ 08:00)  HR: 71 (06-03-23 @ 08:00) (71 - 91)  BP: 117/73 (06-03-23 @ 08:00) (117/60 - 128/72)  RR: 18 (06-03-23 @ 08:00) (18 - 18)  SpO2: 92% (06-03-23 @ 08:00) (91% - 94%)  Wt(kg): --    I&O's Summary    02 Jun 2023 07:01  -  03 Jun 2023 07:00  --------------------------------------------------------  IN: 500 mL / OUT: 650 mL / NET: -150 mL    03 Jun 2023 07:01  -  03 Jun 2023 15:08  --------------------------------------------------------  IN: 377 mL / OUT: 700 mL / NET: -323 mL        CAPILLARY BLOOD GLUCOSE          PHYSICAL EXAM:  Gen: NAD  HEENT:  pupils equal and reactive, EOMI, no oropharyngeal lesions, erythema, exudates, oral thrush  NECK:   supple, no carotid bruits, no palpable lymph nodes, no thyromegaly  CV:  +S1, +S2, regular, no murmurs or rubs  RESP: fair air mvmnt, diffuse crackles and wheezing  BREAST:  not examined  GI:  abdomen soft, non-tender, non-distended, normal BS, no bruits, no abdominal masses, no palpable masses  RECTAL:  not examined  :  not examined  MSK:   normal muscle tone, no atrophy, no rigidity, no contractions  EXT: +amputation  VASCULAR:  pulses equal and symmetric in the upper and lower extremities  NEURO:  AAOX3, no focal neurological deficits, follows all commands, able to move extremities spontaneously  SKIN:  no ulcers, lesions or rashes    LABS:        06-03    128<L>  |  88<L>  |  30<H>  ----------------------------<  140<H>  3.9   |  38<H>  |  0.84    Ca    8.7      03 Jun 2023 07:17      CULTURES:  Urine culture 5/27: Negative  Blood culture 5/27: Negative    Imaging:  CXR 6/2/23: Prior CHF is somewhat improved. Heart enlargement again noted.    XR ankle R 5/27: no fracture, dislocation, soft tissue swelling or joint effusion. Degenerative changes at the ankle joint. Small plantar and retrocalcaneal spurs. Degenerative changes of the talonavicular joint. No radiopaque foreign body.    US Duplex Venous Lower Ext Complete, Bilateral 5/27: No evidence of deep venous thrombosis in either lower extremity.    CT Abdomen and Pelvis WO 5/27: Cirrhotic liver with mild splenomegaly. No ascites. Mildly enlarged 3.1cm splenic hypodense low-attenuation lesion of unclear etiology, possibly a cyst. Consider abdominal MRI for further evaluation of these findings. No bowel distention seen.    CT Chest WO 5/27: 1. New 9 mm left upper lobe nodular opacity and nonspecific dependent groundglass may be infectious or inflammatory. Additional peripheral reticular opacities likely reflect component of chronic interstitial lung disease. Slightly enlarged 5 mm right lower lobe pulmonary nodule since 2020, of unclear significance. Follow-up chest CT is recommended in 6 weeks to evaluate for stability versus resolution of these findings. Cardiac enlargement, multivessel coronary artery calcification, and Watchman device noted. Significantly enlarged main pulmonary artery likely reflects pulmonary arterial hypertension.    Cardiac Testing:  EKG 5/27: Afib with RVR    Echo 5/30/23: EF 60%    Telemetry, personally reviewed:  5/31/23: afib , d/c tele

## 2023-06-04 LAB
BUN SERPL-MCNC: 25 MG/DL — HIGH (ref 7–23)
CALCIUM SERPL-MCNC: 8.9 MG/DL — SIGNIFICANT CHANGE UP (ref 8.5–10.1)
CHLORIDE SERPL-SCNC: 91 MMOL/L — LOW (ref 96–108)
CO2 SERPL-SCNC: 38 MMOL/L — HIGH (ref 22–31)
CREAT SERPL-MCNC: 0.71 MG/DL — SIGNIFICANT CHANGE UP (ref 0.5–1.3)
EGFR: 101 ML/MIN/1.73M2 — SIGNIFICANT CHANGE UP
GLUCOSE SERPL-MCNC: 145 MG/DL — HIGH (ref 70–99)
POTASSIUM SERPL-MCNC: 4.4 MMOL/L — SIGNIFICANT CHANGE UP (ref 3.5–5.3)
POTASSIUM SERPL-SCNC: 4.4 MMOL/L — SIGNIFICANT CHANGE UP (ref 3.5–5.3)
SODIUM SERPL-SCNC: 128 MMOL/L — LOW (ref 135–145)

## 2023-06-04 PROCEDURE — 99232 SBSQ HOSP IP/OBS MODERATE 35: CPT

## 2023-06-04 RX ADMIN — Medication 10 MILLIGRAM(S): at 09:28

## 2023-06-04 RX ADMIN — QUETIAPINE FUMARATE 100 MILLIGRAM(S): 200 TABLET, FILM COATED ORAL at 21:10

## 2023-06-04 RX ADMIN — GABAPENTIN 400 MILLIGRAM(S): 400 CAPSULE ORAL at 21:10

## 2023-06-04 RX ADMIN — ALBUTEROL 2.5 MILLIGRAM(S): 90 AEROSOL, METERED ORAL at 15:27

## 2023-06-04 RX ADMIN — ALBUTEROL 2.5 MILLIGRAM(S): 90 AEROSOL, METERED ORAL at 21:19

## 2023-06-04 RX ADMIN — ENOXAPARIN SODIUM 40 MILLIGRAM(S): 100 INJECTION SUBCUTANEOUS at 21:10

## 2023-06-04 RX ADMIN — CEFEPIME 1000 MILLIGRAM(S): 1 INJECTION, POWDER, FOR SOLUTION INTRAMUSCULAR; INTRAVENOUS at 21:08

## 2023-06-04 RX ADMIN — SPIRONOLACTONE 25 MILLIGRAM(S): 25 TABLET, FILM COATED ORAL at 09:27

## 2023-06-04 RX ADMIN — BUDESONIDE AND FORMOTEROL FUMARATE DIHYDRATE 2 PUFF(S): 160; 4.5 AEROSOL RESPIRATORY (INHALATION) at 21:37

## 2023-06-04 RX ADMIN — Medication 25 MILLIGRAM(S): at 21:09

## 2023-06-04 RX ADMIN — ALBUTEROL 2.5 MILLIGRAM(S): 90 AEROSOL, METERED ORAL at 11:42

## 2023-06-04 RX ADMIN — Medication 5 MILLIGRAM(S): at 09:27

## 2023-06-04 RX ADMIN — Medication 10 MILLIGRAM(S): at 21:09

## 2023-06-04 RX ADMIN — GABAPENTIN 400 MILLIGRAM(S): 400 CAPSULE ORAL at 13:01

## 2023-06-04 RX ADMIN — Medication 100 MILLIGRAM(S): at 21:09

## 2023-06-04 RX ADMIN — Medication 180 MILLIGRAM(S): at 09:28

## 2023-06-04 RX ADMIN — Medication 1 MILLIGRAM(S): at 21:10

## 2023-06-04 RX ADMIN — ALBUTEROL 2.5 MILLIGRAM(S): 90 AEROSOL, METERED ORAL at 06:16

## 2023-06-04 RX ADMIN — CEFEPIME 1000 MILLIGRAM(S): 1 INJECTION, POWDER, FOR SOLUTION INTRAMUSCULAR; INTRAVENOUS at 05:42

## 2023-06-04 RX ADMIN — Medication 2000 UNIT(S): at 21:09

## 2023-06-04 RX ADMIN — Medication 40 MILLIGRAM(S): at 05:42

## 2023-06-04 RX ADMIN — Medication 5 MILLIGRAM(S): at 21:09

## 2023-06-04 RX ADMIN — TIOTROPIUM BROMIDE 2 PUFF(S): 18 CAPSULE ORAL; RESPIRATORY (INHALATION) at 08:14

## 2023-06-04 RX ADMIN — Medication 650 MILLIGRAM(S): at 08:36

## 2023-06-04 RX ADMIN — GABAPENTIN 400 MILLIGRAM(S): 400 CAPSULE ORAL at 05:42

## 2023-06-04 RX ADMIN — Medication 40 MILLIGRAM(S): at 13:01

## 2023-06-04 RX ADMIN — Medication 25 MILLIGRAM(S): at 09:28

## 2023-06-04 RX ADMIN — PANTOPRAZOLE SODIUM 40 MILLIGRAM(S): 20 TABLET, DELAYED RELEASE ORAL at 05:44

## 2023-06-04 RX ADMIN — Medication 650 MILLIGRAM(S): at 07:40

## 2023-06-04 RX ADMIN — Medication 40 MILLIGRAM(S): at 09:27

## 2023-06-04 RX ADMIN — PREGABALIN 1000 MICROGRAM(S): 225 CAPSULE ORAL at 21:10

## 2023-06-04 RX ADMIN — BUDESONIDE AND FORMOTEROL FUMARATE DIHYDRATE 2 PUFF(S): 160; 4.5 AEROSOL RESPIRATORY (INHALATION) at 06:17

## 2023-06-04 RX ADMIN — Medication 40 MILLIGRAM(S): at 21:08

## 2023-06-04 RX ADMIN — Medication 81 MILLIGRAM(S): at 09:28

## 2023-06-04 NOTE — PROGRESS NOTE ADULT - SUBJECTIVE AND OBJECTIVE BOX
HOSPITALIST ATTENDING PROGRESS NOTE    Chart and meds reviewed.  Patient seen and examined.    CC: SOB    Subjective: Denies CP, reports feeling fatigued and with SOB.     All other systems reviewed and found to be negative with the exception of what has been described above.    MEDICATIONS  (STANDING):  albuterol    0.083% 2.5 milliGRAM(s) Nebulizer four times a day  aspirin  chewable 81 milliGRAM(s) Oral daily  bisacodyl 5 milliGRAM(s) Oral every 12 hours  budesonide 160 MICROgram(s)/formoterol 4.5 MICROgram(s) Inhaler 2 Puff(s) Inhalation two times a day  busPIRone 10 milliGRAM(s) Oral two times a day  cefepime  Injectable.      cefepime  Injectable. 1000 milliGRAM(s) IV Push every 12 hours  cholecalciferol 2000 Unit(s) Oral at bedtime  cyanocobalamin 1000 MICROGram(s) Oral at bedtime  diltiazem    milliGRAM(s) Oral daily  doxazosin 1 milliGRAM(s) Oral at bedtime  enoxaparin Injectable 40 milliGRAM(s) SubCutaneous every 24 hours  furosemide   Injectable 40 milliGRAM(s) IV Push daily  gabapentin 400 milliGRAM(s) Oral three times a day  hemorrhoidal Ointment 1 Application(s) Rectal two times a day  melatonin 5 milliGRAM(s) Oral at bedtime  methylPREDNISolone sodium succinate Injectable 40 milliGRAM(s) IV Push every 8 hours  metoprolol tartrate 25 milliGRAM(s) Oral two times a day  pantoprazole    Tablet 40 milliGRAM(s) Oral before breakfast  QUEtiapine 100 milliGRAM(s) Oral at bedtime  spironolactone 25 milliGRAM(s) Oral daily  thiamine 100 milliGRAM(s) Oral at bedtime  tiotropium 2.5 MICROgram(s) Inhaler 2 Puff(s) Inhalation daily    MEDICATIONS  (PRN):  acetaminophen     Tablet .. 650 milliGRAM(s) Oral every 6 hours PRN Temp greater or equal to 38C (100.4F), Mild Pain (1 - 3)  aluminum hydroxide/magnesium hydroxide/simethicone Suspension 30 milliLiter(s) Oral every 4 hours PRN Dyspepsia  ondansetron Injectable 4 milliGRAM(s) IV Push every 8 hours PRN Nausea and/or Vomiting      VITALS:  T(F): 97.9 (06-04-23 @ 08:29), Max: 97.9 (06-04-23 @ 08:29)  HR: 67 (06-04-23 @ 08:29) (67 - 96)  BP: 123/68 (06-04-23 @ 08:29) (114/70 - 130/74)  RR: 18 (06-04-23 @ 08:29) (18 - 18)  SpO2: 94% (06-04-23 @ 08:29) (92% - 96%)  Wt(kg): --    I&O's Summary    03 Jun 2023 07:01  -  04 Jun 2023 07:00  --------------------------------------------------------  IN: 1354 mL / OUT: 2080 mL / NET: -726 mL    04 Jun 2023 07:01  -  04 Jun 2023 12:19  --------------------------------------------------------  IN: 360 mL / OUT: 500 mL / NET: -140 mL        CAPILLARY BLOOD GLUCOSE          PHYSICAL EXAM:  Gen: NAD  HEENT:  pupils equal and reactive, EOMI, no oropharyngeal lesions, erythema, exudates, oral thrush  NECK:   supple, no carotid bruits, no palpable lymph nodes, no thyromegaly  CV:  +S1, +S2, regular, no murmurs or rubs  RESP:  fair air mvmnt, b/l wheezing and rhonchi  BREAST:  not examined  GI:  abdomen soft, non-tender, non-distended, normal BS, no bruits, no abdominal masses, no palpable masses  RECTAL:  not examined  :  not examined  MSK:   normal muscle tone, no atrophy, no rigidity, no contractions  EXT:  no clubbing, no cyanosis, no edema, no calf pain, swelling or erythema  VASCULAR:  pulses equal and symmetric in the upper and lower extremities  NEURO:  AAOX3, no focal neurological deficits, follows all commands, able to move extremities spontaneously  SKIN:  no ulcers, lesions or rashes    LABS:        06-04    128<L>  |  91<L>  |  25<H>  ----------------------------<  145<H>  4.4   |  38<H>  |  0.71    Ca    8.9      04 Jun 2023 06:25    CULTURES:  Urine culture 5/27: Negative  Blood culture 5/27: Negative    Imaging:  CT chest noncon 6/3/23: Since 5/27/2023: New bilateral lower lobe predominant bronchiolitis and consolidation, likely infectious. Unchanged left upper lobe 5 mm nodule but new compared to more remote studies. Recommend three-month follow-up.    CXR 6/2/23: Prior CHF is somewhat improved. Heart enlargement again noted.    XR ankle R 5/27: no fracture, dislocation, soft tissue swelling or joint effusion. Degenerative changes at the ankle joint. Small plantar and retrocalcaneal spurs. Degenerative changes of the talonavicular joint. No radiopaque foreign body.    US Duplex Venous Lower Ext Complete, Bilateral 5/27: No evidence of deep venous thrombosis in either lower extremity.    CT Abdomen and Pelvis WO 5/27: Cirrhotic liver with mild splenomegaly. No ascites. Mildly enlarged 3.1cm splenic hypodense low-attenuation lesion of unclear etiology, possibly a cyst. Consider abdominal MRI for further evaluation of these findings. No bowel distention seen.    CT Chest WO 5/27: 1. New 9 mm left upper lobe nodular opacity and nonspecific dependent groundglass may be infectious or inflammatory. Additional peripheral reticular opacities likely reflect component of chronic interstitial lung disease. Slightly enlarged 5 mm right lower lobe pulmonary nodule since 2020, of unclear significance. Follow-up chest CT is recommended in 6 weeks to evaluate for stability versus resolution of these findings. Cardiac enlargement, multivessel coronary artery calcification, and Watchman device noted. Significantly enlarged main pulmonary artery likely reflects pulmonary arterial hypertension.    Cardiac Testing:  EKG 5/27: Afib with RVR    Echo 5/30/23: EF 60%    Telemetry, personally reviewed:  5/31/23: afib , d/c tele

## 2023-06-04 NOTE — PROGRESS NOTE ADULT - ASSESSMENT
67 y/o man with obesity, VIJAY non-adherent to CPAP, HTN, HFpEF, permanent afib s/p Watchman, mitral regurgitation, peripheral vascular disease s/p L BKA, COPD, pulmonary HTN, chronic resp failure on home O2 4L/min, alcoholic cirrhosis, presented with difficulty breathing, productive cough, wheezing. In the ED, he was afebrile, BP WNL, HR 100s-110s, SPO2 97% on 4L. Exam notable for increased resp effort, B/L wheeze. Labs with negative troponin, , lactate 2.2. EKG afib RVR. CXR with mild pulm edema. RVP PCR panel (+) for parainfluenza infection. He was given aspirin, acetaminophen, Lasix IV, Solumedrol and Duoneb, placed on NIPPV. He subsequently improved to a modest degree and was admitted to Medicine thereafter.     #Acute on chronic hypoxic and hypercapneic respiratory failure  -Multifactorial due to parainfluenza infection, unable to rule out viral pneumonia, present upon admission, VIJAY non-adherent to NIPPV, COPD with exacerbation, pulmonary HTN, and possibly acute on chronic diastolic CHF.   - Continue to treat underlying issues  - O2 supplementation (on continuous home O2)  - NIPPV with BIPAP at night    #HAP, likely GNR  -add cefepime    #Parainfluenza infection, unable to rule out viral pneumonia, present upon admission  -Still with dyspnea and cough but gradually improving  - Continue supportive care measures    #VIJAY  -Stable  - Continue to encourage NIPPV use    #COPD with exacerbation  -Wheeze on exam. No resp distress.   -check CT chest given lack of improvement  - Continue Solumedrol, increased to q8, symbicort, spiriva, standing nebs  -azithro completed    #Acute on chronic diastolic CHF  -No angina. Troponin negative. ProBNP elevated to 1134. Initial EKG afib RVR which may have contributed to the CHF exacerbation.  - TTE done, report in paper chart  - Continue IV Lasix 40mg, now reduced to qd, spironolactone 25mg daily  -s/p dose metolazone 5/30  - Continue metoprolol   - f/u cards recs ([facility])    #Permanent afib, RVR  -HR suboptimally controlled in setting of parainfluenza infection. On metoprolol and diltizem CD. HR now somewhat improved, 80s-90s.  - Continue metoprolol and diltiazem CD  - Not on AC as patient has hx of Watchman placement  -ASA    #Pulmonary nodules in MICHELLE and RLL  -As noted on CT, repeat CT 6 wks    #Alcoholic cirrhosis  No encephalopathy. No ascites. Unknown as to presence of varices.   - Continue diuretics  -w splenic hypodensity, obtain nonemergent MRI to further eval as outpt    #Normocytic anemia  -Did have an episode of rectal bleeding prior to admission, suspected to be hemorrhoidal. He does have hx of R ischemic colitis in past and underlying cirrhosis / alcohol use disorder that could also be responsible for his chronic anemia. -No overt bleeding at this time.  - Continue to monitor,   - Per Dr Barker, outpatient EGD and colonoscopy once respiratory status improving     #Hypomagnesemia  -Improved with supplementation    #Vit D deficiency  -25-OH vit D 17 ng/mL.    - Ordered for Vit D replacement    #DVT ppx- SQL    DVT px: LMWH  Code status: Full    Updated daughter, [phone], on 5/31.  Pt medically active, on iv diuresis, iv steroids, iv abx. D/c >48hrs

## 2023-06-05 LAB
ANION GAP SERPL CALC-SCNC: 3 MMOL/L — LOW (ref 5–17)
BUN SERPL-MCNC: 26 MG/DL — HIGH (ref 7–23)
CALCIUM SERPL-MCNC: 8.9 MG/DL — SIGNIFICANT CHANGE UP (ref 8.5–10.1)
CHLORIDE SERPL-SCNC: 91 MMOL/L — LOW (ref 96–108)
CO2 SERPL-SCNC: 35 MMOL/L — HIGH (ref 22–31)
CREAT SERPL-MCNC: 0.77 MG/DL — SIGNIFICANT CHANGE UP (ref 0.5–1.3)
EGFR: 99 ML/MIN/1.73M2 — SIGNIFICANT CHANGE UP
GLUCOSE SERPL-MCNC: 162 MG/DL — HIGH (ref 70–99)
HCT VFR BLD CALC: 38.4 % — LOW (ref 39–50)
HGB BLD-MCNC: 12.7 G/DL — LOW (ref 13–17)
MCHC RBC-ENTMCNC: 30.8 PG — SIGNIFICANT CHANGE UP (ref 27–34)
MCHC RBC-ENTMCNC: 33.1 GM/DL — SIGNIFICANT CHANGE UP (ref 32–36)
MCV RBC AUTO: 93.2 FL — SIGNIFICANT CHANGE UP (ref 80–100)
PLATELET # BLD AUTO: 162 K/UL — SIGNIFICANT CHANGE UP (ref 150–400)
POTASSIUM SERPL-MCNC: 4.5 MMOL/L — SIGNIFICANT CHANGE UP (ref 3.5–5.3)
POTASSIUM SERPL-SCNC: 4.5 MMOL/L — SIGNIFICANT CHANGE UP (ref 3.5–5.3)
RBC # BLD: 4.12 M/UL — LOW (ref 4.2–5.8)
RBC # FLD: 13.6 % — SIGNIFICANT CHANGE UP (ref 10.3–14.5)
SODIUM SERPL-SCNC: 129 MMOL/L — LOW (ref 135–145)
WBC # BLD: 12.66 K/UL — HIGH (ref 3.8–10.5)
WBC # FLD AUTO: 12.66 K/UL — HIGH (ref 3.8–10.5)

## 2023-06-05 PROCEDURE — 99232 SBSQ HOSP IP/OBS MODERATE 35: CPT

## 2023-06-05 RX ORDER — IPRATROPIUM/ALBUTEROL SULFATE 18-103MCG
3 AEROSOL WITH ADAPTER (GRAM) INHALATION EVERY 6 HOURS
Refills: 0 | Status: DISCONTINUED | OUTPATIENT
Start: 2023-06-05 | End: 2023-06-06

## 2023-06-05 RX ORDER — BUDESONIDE, MICRONIZED 100 %
0.5 POWDER (GRAM) MISCELLANEOUS EVERY 12 HOURS
Refills: 0 | Status: DISCONTINUED | OUTPATIENT
Start: 2023-06-05 | End: 2023-06-09

## 2023-06-05 RX ADMIN — Medication 1 MILLIGRAM(S): at 20:38

## 2023-06-05 RX ADMIN — Medication 81 MILLIGRAM(S): at 09:02

## 2023-06-05 RX ADMIN — ALBUTEROL 2.5 MILLIGRAM(S): 90 AEROSOL, METERED ORAL at 12:09

## 2023-06-05 RX ADMIN — Medication 10 MILLIGRAM(S): at 09:01

## 2023-06-05 RX ADMIN — Medication 100 MILLIGRAM(S): at 20:39

## 2023-06-05 RX ADMIN — GABAPENTIN 400 MILLIGRAM(S): 400 CAPSULE ORAL at 20:39

## 2023-06-05 RX ADMIN — Medication 650 MILLIGRAM(S): at 09:49

## 2023-06-05 RX ADMIN — Medication 1200 MILLIGRAM(S): at 20:41

## 2023-06-05 RX ADMIN — Medication 25 MILLIGRAM(S): at 20:40

## 2023-06-05 RX ADMIN — PREGABALIN 1000 MICROGRAM(S): 225 CAPSULE ORAL at 20:38

## 2023-06-05 RX ADMIN — ALBUTEROL 2.5 MILLIGRAM(S): 90 AEROSOL, METERED ORAL at 16:34

## 2023-06-05 RX ADMIN — Medication 40 MILLIGRAM(S): at 14:12

## 2023-06-05 RX ADMIN — ENOXAPARIN SODIUM 40 MILLIGRAM(S): 100 INJECTION SUBCUTANEOUS at 20:37

## 2023-06-05 RX ADMIN — CEFEPIME 1000 MILLIGRAM(S): 1 INJECTION, POWDER, FOR SOLUTION INTRAMUSCULAR; INTRAVENOUS at 20:40

## 2023-06-05 RX ADMIN — Medication 40 MILLIGRAM(S): at 20:37

## 2023-06-05 RX ADMIN — Medication 5 MILLIGRAM(S): at 09:02

## 2023-06-05 RX ADMIN — GABAPENTIN 400 MILLIGRAM(S): 400 CAPSULE ORAL at 14:12

## 2023-06-05 RX ADMIN — Medication 0.5 MILLIGRAM(S): at 21:29

## 2023-06-05 RX ADMIN — QUETIAPINE FUMARATE 100 MILLIGRAM(S): 200 TABLET, FILM COATED ORAL at 20:39

## 2023-06-05 RX ADMIN — CEFEPIME 1000 MILLIGRAM(S): 1 INJECTION, POWDER, FOR SOLUTION INTRAMUSCULAR; INTRAVENOUS at 09:03

## 2023-06-05 RX ADMIN — GABAPENTIN 400 MILLIGRAM(S): 400 CAPSULE ORAL at 05:08

## 2023-06-05 RX ADMIN — Medication 650 MILLIGRAM(S): at 05:04

## 2023-06-05 RX ADMIN — Medication 180 MILLIGRAM(S): at 09:02

## 2023-06-05 RX ADMIN — Medication 25 MILLIGRAM(S): at 09:02

## 2023-06-05 RX ADMIN — Medication 650 MILLIGRAM(S): at 10:30

## 2023-06-05 RX ADMIN — Medication 5 MILLIGRAM(S): at 20:37

## 2023-06-05 RX ADMIN — Medication 2000 UNIT(S): at 20:39

## 2023-06-05 RX ADMIN — Medication 10 MILLIGRAM(S): at 20:38

## 2023-06-05 RX ADMIN — Medication 40 MILLIGRAM(S): at 05:08

## 2023-06-05 RX ADMIN — Medication 650 MILLIGRAM(S): at 04:04

## 2023-06-05 RX ADMIN — Medication 5 MILLIGRAM(S): at 20:39

## 2023-06-05 RX ADMIN — SPIRONOLACTONE 25 MILLIGRAM(S): 25 TABLET, FILM COATED ORAL at 09:02

## 2023-06-05 RX ADMIN — Medication 40 MILLIGRAM(S): at 09:01

## 2023-06-05 RX ADMIN — PANTOPRAZOLE SODIUM 40 MILLIGRAM(S): 20 TABLET, DELAYED RELEASE ORAL at 05:08

## 2023-06-05 RX ADMIN — Medication 3 MILLILITER(S): at 21:28

## 2023-06-05 RX ADMIN — ALBUTEROL 2.5 MILLIGRAM(S): 90 AEROSOL, METERED ORAL at 07:54

## 2023-06-05 NOTE — PROGRESS NOTE ADULT - PROBLEM SELECTOR PROBLEM 1
Acute on chronic diastolic congestive heart failure
Acute on chronic respiratory failure with hypoxia and hypercapnia
Acute on chronic respiratory failure with hypoxia and hypercapnia

## 2023-06-05 NOTE — PROGRESS NOTE ADULT - SUBJECTIVE AND OBJECTIVE BOX
HOSPITALIST ATTENDING PROGRESS NOTE    Chart and meds reviewed.  Patient seen and examined.    CC: SOB    Subjective: Denies CP, reports continued SOB, some cough.     All other systems reviewed and found to be negative with the exception of what has been described above.    MEDICATIONS  (STANDING):  albuterol    0.083% 2.5 milliGRAM(s) Nebulizer four times a day  aspirin  chewable 81 milliGRAM(s) Oral daily  bisacodyl 5 milliGRAM(s) Oral every 12 hours  budesonide 160 MICROgram(s)/formoterol 4.5 MICROgram(s) Inhaler 2 Puff(s) Inhalation two times a day  busPIRone 10 milliGRAM(s) Oral two times a day  cefepime  Injectable.      cefepime  Injectable. 1000 milliGRAM(s) IV Push every 12 hours  cholecalciferol 2000 Unit(s) Oral at bedtime  cyanocobalamin 1000 MICROGram(s) Oral at bedtime  diltiazem    milliGRAM(s) Oral daily  doxazosin 1 milliGRAM(s) Oral at bedtime  enoxaparin Injectable 40 milliGRAM(s) SubCutaneous every 24 hours  furosemide   Injectable 40 milliGRAM(s) IV Push daily  gabapentin 400 milliGRAM(s) Oral three times a day  hemorrhoidal Ointment 1 Application(s) Rectal two times a day  melatonin 5 milliGRAM(s) Oral at bedtime  methylPREDNISolone sodium succinate Injectable 40 milliGRAM(s) IV Push every 8 hours  metoprolol tartrate 25 milliGRAM(s) Oral two times a day  pantoprazole    Tablet 40 milliGRAM(s) Oral before breakfast  QUEtiapine 100 milliGRAM(s) Oral at bedtime  spironolactone 25 milliGRAM(s) Oral daily  thiamine 100 milliGRAM(s) Oral at bedtime  tiotropium 2.5 MICROgram(s) Inhaler 2 Puff(s) Inhalation daily    MEDICATIONS  (PRN):  acetaminophen     Tablet .. 650 milliGRAM(s) Oral every 6 hours PRN Temp greater or equal to 38C (100.4F), Mild Pain (1 - 3)  aluminum hydroxide/magnesium hydroxide/simethicone Suspension 30 milliLiter(s) Oral every 4 hours PRN Dyspepsia  ondansetron Injectable 4 milliGRAM(s) IV Push every 8 hours PRN Nausea and/or Vomiting      VITALS:  T(F): 97.5 (06-05-23 @ 07:46), Max: 97.9 (06-05-23 @ 04:09)  HR: 65 (06-05-23 @ 07:46) (65 - 77)  BP: 121/70 (06-05-23 @ 07:46) (116/71 - 141/75)  RR: 19 (06-05-23 @ 07:46) (18 - 19)  SpO2: 96% (06-05-23 @ 07:46) (94% - 96%)  Wt(kg): --    I&O's Summary    04 Jun 2023 07:01  -  05 Jun 2023 07:00  --------------------------------------------------------  IN: 2080 mL / OUT: 2875 mL / NET: -795 mL    05 Jun 2023 07:01  -  05 Jun 2023 14:37  --------------------------------------------------------  IN: 400 mL / OUT: 1410 mL / NET: -1010 mL        CAPILLARY BLOOD GLUCOSE          PHYSICAL EXAM:  Gen: NAD  HEENT:  pupils equal and reactive, EOMI, no oropharyngeal lesions, erythema, exudates, oral thrush  NECK:   supple, no carotid bruits, no palpable lymph nodes, no thyromegaly  CV:  +S1, +S2, regular, no murmurs or rubs  RESP:  fair air mvnt, reduced wheezing as compared to yesterday, continued rhonchi  BREAST:  not examined  GI:  abdomen soft, non-tender, non-distended, normal BS, no bruits, no abdominal masses, no palpable masses  RECTAL:  not examined  :  not examined  MSK:   normal muscle tone, no atrophy, no rigidity, no contractions  EXT: +amputation  VASCULAR:  pulses equal and symmetric in the upper and lower extremities  NEURO:  AAOX3, no focal neurological deficits, follows all commands, able to move extremities spontaneously  SKIN:  no ulcers, lesions or rashes    LABS:                            12.7   12.66 )-----------( 162      ( 05 Jun 2023 07:52 )             38.4     06-05    129<L>  |  91<L>  |  26<H>  ----------------------------<  162<H>  4.5   |  35<H>  |  0.77    Ca    8.9      05 Jun 2023 07:52    CULTURES:  Urine culture 5/27: Negative  Blood culture 5/27: Negative    Imaging:  CT chest noncon 6/3/23: Since 5/27/2023: New bilateral lower lobe predominant bronchiolitis and consolidation, likely infectious. Unchanged left upper lobe 5 mm nodule but new compared to more remote studies. Recommend three-month follow-up.    CXR 6/2/23: Prior CHF is somewhat improved. Heart enlargement again noted.    XR ankle R 5/27: no fracture, dislocation, soft tissue swelling or joint effusion. Degenerative changes at the ankle joint. Small plantar and retrocalcaneal spurs. Degenerative changes of the talonavicular joint. No radiopaque foreign body.    US Duplex Venous Lower Ext Complete, Bilateral 5/27: No evidence of deep venous thrombosis in either lower extremity.    CT Abdomen and Pelvis WO 5/27: Cirrhotic liver with mild splenomegaly. No ascites. Mildly enlarged 3.1cm splenic hypodense low-attenuation lesion of unclear etiology, possibly a cyst. Consider abdominal MRI for further evaluation of these findings. No bowel distention seen.    CT Chest WO 5/27: 1. New 9 mm left upper lobe nodular opacity and nonspecific dependent groundglass may be infectious or inflammatory. Additional peripheral reticular opacities likely reflect component of chronic interstitial lung disease. Slightly enlarged 5 mm right lower lobe pulmonary nodule since 2020, of unclear significance. Follow-up chest CT is recommended in 6 weeks to evaluate for stability versus resolution of these findings. Cardiac enlargement, multivessel coronary artery calcification, and Watchman device noted. Significantly enlarged main pulmonary artery likely reflects pulmonary arterial hypertension.    Cardiac Testing:  EKG 5/27: Afib with RVR    Echo 5/30/23: EF 60%    Telemetry, personally reviewed:  5/31/23: afib , d/c tele

## 2023-06-05 NOTE — PROGRESS NOTE ADULT - ASSESSMENT
69 y/o man with obesity, VIJAY non-adherent to CPAP, HTN, HFpEF, permanent afib s/p Watchman, mitral regurgitation, peripheral vascular disease s/p L BKA, COPD, pulmonary HTN, chronic resp failure on home O2 4L/min, alcoholic cirrhosis, presented with difficulty breathing, productive cough, wheezing. In the ED, he was afebrile, BP WNL, HR 100s-110s, SPO2 97% on 4L. Exam notable for increased resp effort, B/L wheeze. Labs with negative troponin, , lactate 2.2. EKG afib RVR. CXR with mild pulm edema. RVP PCR panel (+) for parainfluenza infection. He was given aspirin, acetaminophen, Lasix IV, Solumedrol and Duoneb, placed on NIPPV. He subsequently improved to a modest degree and was admitted to Medicine thereafter.     #Acute on chronic hypoxic and hypercapneic respiratory failure  -Multifactorial due to parainfluenza infection, unable to rule out viral pneumonia, present upon admission, VIJAY non-adherent to NIPPV, COPD with exacerbation, pulmonary HTN, and possibly acute on chronic diastolic CHF.   - Continue to treat underlying issues  - O2 supplementation (on continuous home O2)  - NIPPV with BIPAP at night    #HAP, likely GNR  -added cefepime 6/3    #Parainfluenza infection, unable to rule out viral pneumonia, present upon admission  -Still with dyspnea and cough but gradually improving  - Continue supportive care measures    #VIJAY  -Stable  - Continue to encourage NIPPV use    #COPD with exacerbation  -Wheeze on exam. No resp distress.   -check CT chest given lack of improvement, as above, with c/f PNA on 6/3  - Continue Solumedrol, increased to q8, likely reduce to q12 tmrw, symbicort, spiriva, standing nebs  -azithro completed  -add mucinex    #Acute on chronic diastolic CHF  -No angina. Troponin negative. ProBNP elevated to 1134. Initial EKG afib RVR which may have contributed to the CHF exacerbation.  - TTE done, report in paper chart  - Continue IV Lasix 40mg, now qd, spironolactone 25mg daily  -s/p dose metolazone 5/30  - Continue metoprolol   - f/u cards recs ([facility])    #Permanent afib, RVR  -HR suboptimally controlled in setting of parainfluenza infection. On metoprolol and diltizem CD. HR now somewhat improved, 80s-90s.  - Continue metoprolol and diltiazem CD  - Not on AC as patient has hx of Watchman placement  -ASA    #Pulmonary nodules in MICHELLE and RLL  -As noted on CT, repeat CT 6 wks    #Alcoholic cirrhosis  No encephalopathy. No ascites. Unknown as to presence of varices.   - Continue diuretics  -w splenic hypodensity, obtain nonemergent MRI to further eval as outpt    #Normocytic anemia  -Did have an episode of rectal bleeding prior to admission, suspected to be hemorrhoidal. He does have hx of R ischemic colitis in past and underlying cirrhosis / alcohol use disorder that could also be responsible for his chronic anemia. -No overt bleeding at this time.  - Continue to monitor,   - Per Dr Barker, outpatient EGD and colonoscopy once respiratory status improving     #Hypomagnesemia  -Improved with supplementation    #Vit D deficiency  -25-OH vit D 17 ng/mL.    - Ordered for Vit D replacement    #DVT ppx- SQL    DVT px: LMWH  Code status: Full    Updated daughter, [phone], on 5/31.  Pt medically active, on iv diuresis, iv steroids, iv abx. D/c >48hrs

## 2023-06-05 NOTE — PROGRESS NOTE ADULT - PROBLEM SELECTOR PLAN 1
Pt without complaints of chest pain. SOB likely multifactorial (HFpEF, parainfluenza, VIJAY, obesity, COPD).  Weight is up.  BNP trending up.  Continue IV lasix.  Continue GDMT with BB/MRA, daily weight, fluid restriction 1.5L/day,  Echo with preserved LVEF 60%, moderate MR/TR, mild LVH, no effusions     pt. is stable from cardiac standpoint, will sign followup prn

## 2023-06-05 NOTE — PROGRESS NOTE ADULT - PROBLEM SELECTOR PROBLEM 2
Infection due to parainfluenza virus 3
Chronic atrial fibrillation
Infection due to parainfluenza virus 3

## 2023-06-05 NOTE — PROGRESS NOTE ADULT - SUBJECTIVE AND OBJECTIVE BOX
REASON FOR VISIT: AF, CHF, Rx management      HPI:  67 y/o male with PMH of HFpEF, parox atrial fibrillation, Watchman Implant, MR , COPD on 4 L of home O2,  ETOH induced cirrhosis,  HTN, obesity, VIJAY (noncompliant w/ CPAP), alcoholism, pulmonary HTN,  Left BKA  presents to  with 1 day history of difficulty breathing, productive cough, wheezing.  Patient states that he has had worsening shortness of breath since yesterday and this evening he had much worsening of symptoms.  Brought in by EMS who stated he was very short of breath and oxygen was in the high 80s/low 90s upon arrival.  Improved with supplemental oxygen and albuterol nebulizer treatment. Patient also reports episode of rectal bleeding episode at home.     in ED - Vital sings  T  Max: 37.1, T(F) Max: 98.7 , HR: 106  (106 - 110), BP: 128/66 (124/56 - 128/66) RR: 16  (16 - 30) SpO2: 97%  (86% - 100%) EKG  A fib 112, Lactate 2.2--> 1.4, Mg 1.3, troponin 7.5 , , venous blood gas Ph 7.34 Parainfluenza positive, CXR - mild pulmonary edema, s/p , tylenol, duoneb, lasix 80 s/p BIPAP     5/29/23: Pt feels improved.   5/30/23: Pt denies chest pain.  Still has shortness of breath.  Tele: afib rate controlled.  5/31/23: C/O SOB, Tele: Afib   6/1/23: less SOB, took off his monitor   6/2/23: feels better, off tele   6/5/23: no cardiac complaints, breathing improved, off tele     MEDICATIONS  (STANDING):  albuterol    0.083% 2.5 milliGRAM(s) Nebulizer four times a day  aspirin  chewable 81 milliGRAM(s) Oral daily  bisacodyl 5 milliGRAM(s) Oral every 12 hours  budesonide 160 MICROgram(s)/formoterol 4.5 MICROgram(s) Inhaler 2 Puff(s) Inhalation two times a day  busPIRone 10 milliGRAM(s) Oral two times a day  cefepime  Injectable.      cefepime  Injectable. 1000 milliGRAM(s) IV Push every 12 hours  cholecalciferol 2000 Unit(s) Oral at bedtime  cyanocobalamin 1000 MICROGram(s) Oral at bedtime  diltiazem    milliGRAM(s) Oral daily  doxazosin 1 milliGRAM(s) Oral at bedtime  enoxaparin Injectable 40 milliGRAM(s) SubCutaneous every 24 hours  furosemide   Injectable 40 milliGRAM(s) IV Push daily  gabapentin 400 milliGRAM(s) Oral three times a day  hemorrhoidal Ointment 1 Application(s) Rectal two times a day  melatonin 5 milliGRAM(s) Oral at bedtime  methylPREDNISolone sodium succinate Injectable 40 milliGRAM(s) IV Push every 8 hours  metoprolol tartrate 25 milliGRAM(s) Oral two times a day  pantoprazole    Tablet 40 milliGRAM(s) Oral before breakfast  QUEtiapine 100 milliGRAM(s) Oral at bedtime  spironolactone 25 milliGRAM(s) Oral daily  thiamine 100 milliGRAM(s) Oral at bedtime  tiotropium 2.5 MICROgram(s) Inhaler 2 Puff(s) Inhalation daily      MEDICATIONS  (PRN):  acetaminophen     Tablet .. 650 milliGRAM(s) Oral every 6 hours PRN Temp greater or equal to 38C (100.4F), Mild Pain (1 - 3)  aluminum hydroxide/magnesium hydroxide/simethicone Suspension 30 milliLiter(s) Oral every 4 hours PRN Dyspepsia  metoprolol tartrate Injectable 5 milliGRAM(s) IV Push every 6 hours PRN HR>130  ondansetron Injectable 4 milliGRAM(s) IV Push every 8 hours PRN Nausea and/or Vomiting    Vital Signs Last 24 Hrs  T(C): 36.4 (05 Jun 2023 07:46), Max: 36.6 (05 Jun 2023 04:09)  T(F): 97.5 (05 Jun 2023 07:46), Max: 97.9 (05 Jun 2023 04:09)  HR: 65 (05 Jun 2023 07:46) (65 - 77)  BP: 121/70 (05 Jun 2023 07:46) (116/71 - 141/75)  BP(mean): --  RR: 19 (05 Jun 2023 07:46) (18 - 19)  SpO2: 96% (05 Jun 2023 07:46) (94% - 96%)    Parameters below as of 05 Jun 2023 07:46  Patient On (Oxygen Delivery Method): nasal cannula  O2 Flow (L/min): 5      PHYSICAL EXAM:    Constitutional: NAD, awake and alert, well-developed  HEENT: PERR, EOMI,  No oral cyananosis.  Neck:  supple,  No JVD  Respiratory: exp wheezing noted   Cardiovascular: S1 and S2,irreg rhythm, no Murmurs, gallops or rubs  Gastrointestinal: Bowel Sounds present, soft, nontender.   Extremities: L BKA, right no peripheral edema. No clubbing or cyanosis,  2+ peripheral pulses  Neurological: A/O x 3, no focal deficits  Musculoskeletal: no calf tenderness.  Skin: No rashes.      LABS: All Labs Reviewed:  06-02    131<L>  |  89<L>  |  31<H>  ----------------------------<  124<H>  4.0   |  38<H>  |  0.91    Ca    8.8      02 Jun 2023 08:04      - TroponinI hsT: <-8.42, <-7.18, <-7.58  RADIOLOGY/EKG: rviewed    < from: 12 Lead ECG (05.28.23 @ 07:28) >    Ventricular Rate 123 BPM    QRS Duration 86 ms    Q-T Interval 294 ms    QTC Calculation(Bazett) 420 ms    R Axis 72 degrees    T Axis 53 degrees    Diagnosis Line Atrial fibrillation with rapid ventricular response with premature ventricular or aberrantly conducted complexes  Anteroseptal infarct (cited on or before 17-AUG-2017)  Abnormal ECG  When compared with ECG of 28-MAY-2023 07:27,  No significant change was found  Confirmed by Palla MD, Omar ([id number]) on 5/28/2023 10:40:24 PM    < end of copied text >

## 2023-06-05 NOTE — PROGRESS NOTE ADULT - PROBLEM SELECTOR PLAN 2
Continue diltiazem and metoprolol. Rate is controlled.
Continue diltiazem and metoprolol. Rate is controlled.
Continue diltiazem and metoprolol. Rate has improved. Episodes of NSR.
Continue diltiazem and metoprolol. Rate is controlled.

## 2023-06-05 NOTE — PROGRESS NOTE ADULT - ASSESSMENT
Acute on chronic respiratory failure with hypoxia and hypercapnia /  parainfluenza virus / VIJAY and COPD overlap syndrome / Obesity hypoventilation syndrome / pHTN / Hospital Aquired Pneumonia / Pulmonary Nodules     -Ct chest reviewed, will need f/u in 3 months 	  - patient now on 5L nasal canula; no distress  - continue duoneb/budesonide aerosol; changed to nebulizer   - Maintain iv steroids 40mg q 8  - noncompliant w BIPAP  - in afib w rate controlled  - Continue cefepime, started 6/3  - IV diuresis, cardiology following     Problem/Plan - 1:  ·  Problem: Acute on chronic respiratory failure with hypoxia and hypercapnia.   Problem/Plan - 2:  ·  Problem: Infection due to parainfluenza virus 3.   Problem/Plan - 3:  ·  Problem: VIJAY and COPD overlap syndrome.   Problem/Plan - 4:  ·  Problem: Acute bronchospasm.   Problem/Plan - 5:  ·  Problem: Pulmonary hypertension.   Problem/Plan - 6:  ·  Problem: Obesity hypoventilation syndrome.   Problem/Plan - 7:  ·  Problem: ILD (interstitial lung disease).   Problem/Plan - 8:  ·  Problem: Nodule of upper lobe of left lung.

## 2023-06-05 NOTE — PROGRESS NOTE ADULT - SUBJECTIVE AND OBJECTIVE BOX
CHIEF COMPLAINT:    SUBJECTIVE:     REVIEW OF SYSTEMS:  CONSTITUTIONAL: No weakness, fevers or chills  EYES/ENT: No visual changes;  No vertigo or throat pain   NECK: No pain or stiffness  RESPIRATORY: No cough, wheezing, hemoptysis; No shortness of breath  CARDIOVASCULAR: No chest pain or palpitations  GASTROINTESTINAL: No abdominal or epigastric pain. No nausea, vomiting, or hematemesis; No diarrhea or constipation. No melena or hematochezia.  GENITOURINARY: No dysuria, frequency or hematuria  NEUROLOGICAL: No numbness or weakness  SKIN: No itching, burning, rashes, or lesions   All other review of systems is negative unless indicated above    Vital Signs Last 24 Hrs  T(C): 36.4 (05 Jun 2023 07:46), Max: 36.6 (05 Jun 2023 04:09)  T(F): 97.5 (05 Jun 2023 07:46), Max: 97.9 (05 Jun 2023 04:09)  HR: 65 (05 Jun 2023 07:46) (65 - 77)  BP: 121/70 (05 Jun 2023 07:46) (116/71 - 141/75)  BP(mean): --  RR: 19 (05 Jun 2023 07:46) (18 - 19)  SpO2: 96% (05 Jun 2023 07:46) (94% - 96%)    Parameters below as of 05 Jun 2023 07:46  Patient On (Oxygen Delivery Method): nasal cannula  O2 Flow (L/min): 5    PHYSICAL EXAM:  Constitutional: NAD, awake and alert, well-developed  HEENT: PERR, EOMI  Neck: Soft and supple,  No JVD  Respiratory: Breath sounds are clear bilaterally, No wheezing, rales or rhonchi  Cardiovascular: S1 and S2, regular rate and rhythm, no Murmurs  Gastrointestinal: Bowel Sounds present, soft, nontender, nondistended  Extremities: No peripheral edema  Vascular: 2+ peripheral pulses  Neurological: A/O x 3, no focal deficits  Musculoskeletal: 5/5 strength b/l upper and lower extremities  Skin: No rashes    MEDICATIONS:  MEDICATIONS  (STANDING):  albuterol    0.083% 2.5 milliGRAM(s) Nebulizer four times a day  aspirin  chewable 81 milliGRAM(s) Oral daily  bisacodyl 5 milliGRAM(s) Oral every 12 hours  budesonide 160 MICROgram(s)/formoterol 4.5 MICROgram(s) Inhaler 2 Puff(s) Inhalation two times a day  busPIRone 10 milliGRAM(s) Oral two times a day  cefepime  Injectable.      cefepime  Injectable. 1000 milliGRAM(s) IV Push every 12 hours  cholecalciferol 2000 Unit(s) Oral at bedtime  cyanocobalamin 1000 MICROGram(s) Oral at bedtime  diltiazem    milliGRAM(s) Oral daily  doxazosin 1 milliGRAM(s) Oral at bedtime  enoxaparin Injectable 40 milliGRAM(s) SubCutaneous every 24 hours  furosemide   Injectable 40 milliGRAM(s) IV Push daily  gabapentin 400 milliGRAM(s) Oral three times a day  hemorrhoidal Ointment 1 Application(s) Rectal two times a day  melatonin 5 milliGRAM(s) Oral at bedtime  methylPREDNISolone sodium succinate Injectable 40 milliGRAM(s) IV Push every 8 hours  metoprolol tartrate 25 milliGRAM(s) Oral two times a day  pantoprazole    Tablet 40 milliGRAM(s) Oral before breakfast  QUEtiapine 100 milliGRAM(s) Oral at bedtime  spironolactone 25 milliGRAM(s) Oral daily  thiamine 100 milliGRAM(s) Oral at bedtime  tiotropium 2.5 MICROgram(s) Inhaler 2 Puff(s) Inhalation daily      LABS: All Labs Reviewed:                        12.7   12.66 )-----------( 162      ( 05 Jun 2023 07:52 )             38.4     06-05    129<L>  |  91<L>  |  26<H>  ----------------------------<  162<H>  4.5   |  35<H>  |  0.77    Ca    8.9      05 Jun 2023 07:52            Blood Culture:     RADIOLOGY/EKG:    DVT PPX:    ADVANCED DIRECTIVE:    DISPOSITION: CHIEF COMPLAINT: insomnia     SUBJECTIVE: 6/5/23 seen and examined in bed, remains on 5L supplemental oxygen, reports feeling better, no resp distress, + insomnia     REVIEW OF SYSTEMS:  CONSTITUTIONAL: No weakness, fevers or chills  RESPIRATORY: No cough, wheezing, hemoptysis; + exertional shortness of breath  CARDIOVASCULAR: No chest pain or palpitations  GASTROINTESTINAL: No abdominal or epigastric pain. No nausea, vomiting   NEUROLOGICAL: No numbness or weakness  All other review of systems is negative unless indicated above    Vital Signs Last 24 Hrs  T(C): 36.4 (05 Jun 2023 07:46), Max: 36.6 (05 Jun 2023 04:09)  T(F): 97.5 (05 Jun 2023 07:46), Max: 97.9 (05 Jun 2023 04:09)  HR: 65 (05 Jun 2023 07:46) (65 - 77)  BP: 121/70 (05 Jun 2023 07:46) (116/71 - 141/75)  RR: 19 (05 Jun 2023 07:46) (18 - 19)  SpO2: 96% (05 Jun 2023 07:46) (94% - 96%)    Parameters below as of 05 Jun 2023 07:46  Patient On (Oxygen Delivery Method): nasal cannula  O2 Flow (L/min): 5    PHYSICAL EXAM:  Constitutional: NAD, awake and alert  HEENT: EOMI  Neck: Soft and supple  Respiratory: Bilateral rhonchi, wheezing and crackles.  Cardiovascular: S1 and S2, regular rate and rhythm  Gastrointestinal: Bowel Sounds present, soft, nontender, nondistended, obese   Extremities: trace rle edema, chronic venous stasis changes; Left below knee amputation   Vascular: 2+ peripheral pulses  Neurological: A/O x 3, no focal deficits    MEDICATIONS:  MEDICATIONS  (STANDING):  albuterol    0.083% 2.5 milliGRAM(s) Nebulizer four times a day  aspirin  chewable 81 milliGRAM(s) Oral daily  bisacodyl 5 milliGRAM(s) Oral every 12 hours  budesonide 160 MICROgram(s)/formoterol 4.5 MICROgram(s) Inhaler 2 Puff(s) Inhalation two times a day  busPIRone 10 milliGRAM(s) Oral two times a day  cefepime  Injectable.      cefepime  Injectable. 1000 milliGRAM(s) IV Push every 12 hours  cholecalciferol 2000 Unit(s) Oral at bedtime  cyanocobalamin 1000 MICROGram(s) Oral at bedtime  diltiazem    milliGRAM(s) Oral daily  doxazosin 1 milliGRAM(s) Oral at bedtime  enoxaparin Injectable 40 milliGRAM(s) SubCutaneous every 24 hours  furosemide   Injectable 40 milliGRAM(s) IV Push daily  gabapentin 400 milliGRAM(s) Oral three times a day  guaiFENesin ER 1200 milliGRAM(s) Oral every 12 hours  hemorrhoidal Ointment 1 Application(s) Rectal two times a day  melatonin 5 milliGRAM(s) Oral at bedtime  methylPREDNISolone sodium succinate Injectable 40 milliGRAM(s) IV Push every 8 hours  metoprolol tartrate 25 milliGRAM(s) Oral two times a day  pantoprazole    Tablet 40 milliGRAM(s) Oral before breakfast  QUEtiapine 100 milliGRAM(s) Oral at bedtime  spironolactone 25 milliGRAM(s) Oral daily  thiamine 100 milliGRAM(s) Oral at bedtime  tiotropium 2.5 MICROgram(s) Inhaler 2 Puff(s) Inhalation daily    MEDICATIONS  (PRN):  acetaminophen     Tablet .. 650 milliGRAM(s) Oral every 6 hours PRN Temp greater or equal to 38C (100.4F), Mild Pain (1 - 3)  aluminum hydroxide/magnesium hydroxide/simethicone Suspension 30 milliLiter(s) Oral every 4 hours PRN Dyspepsia  ondansetron Injectable 4 milliGRAM(s) IV Push every 8 hours PRN Nausea and/or Vomiting    LABS: All Labs Reviewed:                        12.7   12.66 )-----------( 162      ( 05 Jun 2023 07:52 )             38.4     06-05    129<L>  |  91<L>  |  26<H>  ----------------------------<  162<H>  4.5   |  35<H>  |  0.77    Ca    8.9      05 Jun 2023 07:52    RADIOLOGY/EKG:  < from: CT Chest No Cont (06.03.23 @ 17:01) >  FINDINGS:    LUNGS/AIRWAYS/PLEURA: Patent airways through the segmental bronchi. New   bilateral lower lobe predominant patchy consolidation, tree-in-bud, and   other small lung nodules. Unchanged 5 mm nodule in the left upper lobe   (2-47). Multiple calcified granulomas. No pleural effusion.    LYMPH NODES/MEDIASTINUM: Unchanged 1.7 cm right thyroid nodule. No   lymphadenopathy. Calcified lymph nodes compatible with prior   granulomatous disease.    HEART/VASCULATURE: Multichamber cardiac enlargement. Left atrial   appendage occlusion 6 device. Coronary artery calcifications. No   pericardial effusion. Normal caliber aorta and 4.2 cm midascending aorta.   Dilated pulmonary artery, larger than the ascending aorta.    UPPER ABDOMEN: Calcified granulomas in the spleen. Unchanged   hypoattenuating 3.3 cm lesion in the spleen.    BONES/SOFT TISSUES: No acute or suspicious abnormality.      IMPRESSION:    Since 5/27/2023:    New bilateral lower lobe predominant bronchiolitis and consolidation,   likely infectious.    Unchanged left upper lobe 5 mm nodule but new compared to more remote   studies. Recommend three-month follow-up.    < end of copied text >  < from: Xray Chest 1 View- PORTABLE-Urgent (Xray Chest 1 View- PORTABLE-Urgent .) (06.02.23 @ 12:39) >  INTERPRETATION:  AP erect chest on June 2, 2023 at 12:34 PM. Patient has   cough.    Gross heart enlargement again noted.    On May 27 this year there was mild CHF. This is somewhat improved at this   time.    IMPRESSION: Prior CHF is somewhat improved. Heart enlargement again noted.      < end of copied text >

## 2023-06-06 LAB
ANION GAP SERPL CALC-SCNC: 3 MMOL/L — LOW (ref 5–17)
BUN SERPL-MCNC: 28 MG/DL — HIGH (ref 7–23)
CALCIUM SERPL-MCNC: 8.8 MG/DL — SIGNIFICANT CHANGE UP (ref 8.5–10.1)
CHLORIDE SERPL-SCNC: 93 MMOL/L — LOW (ref 96–108)
CO2 SERPL-SCNC: 36 MMOL/L — HIGH (ref 22–31)
CREAT SERPL-MCNC: 0.93 MG/DL — SIGNIFICANT CHANGE UP (ref 0.5–1.3)
EGFR: 91 ML/MIN/1.73M2 — SIGNIFICANT CHANGE UP
GLUCOSE SERPL-MCNC: 156 MG/DL — HIGH (ref 70–99)
POTASSIUM SERPL-MCNC: 5 MMOL/L — SIGNIFICANT CHANGE UP (ref 3.5–5.3)
POTASSIUM SERPL-SCNC: 5 MMOL/L — SIGNIFICANT CHANGE UP (ref 3.5–5.3)
SODIUM SERPL-SCNC: 132 MMOL/L — LOW (ref 135–145)

## 2023-06-06 PROCEDURE — 99233 SBSQ HOSP IP/OBS HIGH 50: CPT

## 2023-06-06 PROCEDURE — 99232 SBSQ HOSP IP/OBS MODERATE 35: CPT

## 2023-06-06 RX ORDER — MINERAL OIL
133 OIL (ML) MISCELLANEOUS ONCE
Refills: 0 | Status: DISCONTINUED | OUTPATIENT
Start: 2023-06-06 | End: 2023-06-06

## 2023-06-06 RX ORDER — SENNA PLUS 8.6 MG/1
2 TABLET ORAL AT BEDTIME
Refills: 0 | Status: DISCONTINUED | OUTPATIENT
Start: 2023-06-06 | End: 2023-06-06

## 2023-06-06 RX ORDER — TIOTROPIUM BROMIDE 18 UG/1
2 CAPSULE ORAL; RESPIRATORY (INHALATION) DAILY
Refills: 0 | Status: DISCONTINUED | OUTPATIENT
Start: 2023-06-06 | End: 2023-06-09

## 2023-06-06 RX ADMIN — Medication 5 MILLIGRAM(S): at 22:33

## 2023-06-06 RX ADMIN — GABAPENTIN 400 MILLIGRAM(S): 400 CAPSULE ORAL at 05:44

## 2023-06-06 RX ADMIN — PHENYLEPHRINE-SHARK LIVER OIL-MINERAL OIL-PETROLATUM RECTAL OINTMENT 1 APPLICATION(S): at 11:57

## 2023-06-06 RX ADMIN — Medication 10 MILLIGRAM(S): at 22:30

## 2023-06-06 RX ADMIN — Medication 3 MILLILITER(S): at 14:13

## 2023-06-06 RX ADMIN — Medication 40 MILLIGRAM(S): at 05:44

## 2023-06-06 RX ADMIN — PANTOPRAZOLE SODIUM 40 MILLIGRAM(S): 20 TABLET, DELAYED RELEASE ORAL at 05:45

## 2023-06-06 RX ADMIN — QUETIAPINE FUMARATE 100 MILLIGRAM(S): 200 TABLET, FILM COATED ORAL at 22:31

## 2023-06-06 RX ADMIN — Medication 650 MILLIGRAM(S): at 02:48

## 2023-06-06 RX ADMIN — CEFEPIME 1000 MILLIGRAM(S): 1 INJECTION, POWDER, FOR SOLUTION INTRAMUSCULAR; INTRAVENOUS at 22:32

## 2023-06-06 RX ADMIN — Medication 3 MILLILITER(S): at 20:27

## 2023-06-06 RX ADMIN — Medication 180 MILLIGRAM(S): at 09:15

## 2023-06-06 RX ADMIN — Medication 1 MILLIGRAM(S): at 22:31

## 2023-06-06 RX ADMIN — PREGABALIN 1000 MICROGRAM(S): 225 CAPSULE ORAL at 22:32

## 2023-06-06 RX ADMIN — Medication 0.5 MILLIGRAM(S): at 08:12

## 2023-06-06 RX ADMIN — Medication 25 MILLIGRAM(S): at 09:13

## 2023-06-06 RX ADMIN — ENOXAPARIN SODIUM 40 MILLIGRAM(S): 100 INJECTION SUBCUTANEOUS at 22:31

## 2023-06-06 RX ADMIN — Medication 40 MILLIGRAM(S): at 22:33

## 2023-06-06 RX ADMIN — Medication 3 MILLILITER(S): at 08:12

## 2023-06-06 RX ADMIN — Medication 0.5 MILLIGRAM(S): at 20:27

## 2023-06-06 RX ADMIN — GABAPENTIN 400 MILLIGRAM(S): 400 CAPSULE ORAL at 15:02

## 2023-06-06 RX ADMIN — Medication 2000 UNIT(S): at 22:33

## 2023-06-06 RX ADMIN — Medication 650 MILLIGRAM(S): at 03:48

## 2023-06-06 RX ADMIN — Medication 650 MILLIGRAM(S): at 15:04

## 2023-06-06 RX ADMIN — Medication 5 MILLIGRAM(S): at 09:14

## 2023-06-06 RX ADMIN — SPIRONOLACTONE 25 MILLIGRAM(S): 25 TABLET, FILM COATED ORAL at 12:01

## 2023-06-06 RX ADMIN — Medication 5 MILLIGRAM(S): at 22:32

## 2023-06-06 RX ADMIN — Medication 1200 MILLIGRAM(S): at 22:33

## 2023-06-06 RX ADMIN — Medication 3 MILLILITER(S): at 02:06

## 2023-06-06 RX ADMIN — Medication 10 MILLIGRAM(S): at 09:13

## 2023-06-06 RX ADMIN — Medication 81 MILLIGRAM(S): at 09:14

## 2023-06-06 RX ADMIN — Medication 1200 MILLIGRAM(S): at 09:14

## 2023-06-06 RX ADMIN — Medication 25 MILLIGRAM(S): at 22:33

## 2023-06-06 RX ADMIN — Medication 100 MILLIGRAM(S): at 22:32

## 2023-06-06 RX ADMIN — CEFEPIME 1000 MILLIGRAM(S): 1 INJECTION, POWDER, FOR SOLUTION INTRAMUSCULAR; INTRAVENOUS at 09:15

## 2023-06-06 RX ADMIN — GABAPENTIN 400 MILLIGRAM(S): 400 CAPSULE ORAL at 22:31

## 2023-06-06 NOTE — PROGRESS NOTE ADULT - SUBJECTIVE AND OBJECTIVE BOX
CHIEF COMPLAINT: insomnia     SUBJECTIVE: 6/5/23 seen and examined in bed, remains on 5L supplemental oxygen, reports feeling better, no resp distress, + insomnia     6/6:  in chair, awake, on 3 L/min NC O2, has cough, greenish sputum.     REVIEW OF SYSTEMS:  CONSTITUTIONAL: No weakness, fevers or chills  RESPIRATORY: No cough, wheezing, hemoptysis; + exertional shortness of breath  CARDIOVASCULAR: No chest pain or palpitations  GASTROINTESTINAL: No abdominal or epigastric pain. No nausea, vomiting   NEUROLOGICAL: No numbness or weakness  All other review of systems is negative unless indicated above    Vital Signs Last 24 Hrs  T(C): 36.4 (05 Jun 2023 07:46), Max: 36.6 (05 Jun 2023 04:09)  T(F): 97.5 (05 Jun 2023 07:46), Max: 97.9 (05 Jun 2023 04:09)  HR: 65 (05 Jun 2023 07:46) (65 - 77)  BP: 121/70 (05 Jun 2023 07:46) (116/71 - 141/75)  RR: 19 (05 Jun 2023 07:46) (18 - 19)  SpO2: 96% (05 Jun 2023 07:46) (94% - 96%)    Parameters below as of 05 Jun 2023 07:46  Patient On (Oxygen Delivery Method): nasal cannula  O2 Flow (L/min): 5    PHYSICAL EXAM:  Constitutional: NAD, awake and alert  HEENT: EOMI  Neck: Soft and supple  Respiratory: Bilateral rhonchi, few low pitched expir wheezes.  Cardiovascular: S1 and S2, regular rate and rhythm  Gastrointestinal: Bowel Sounds present, soft, nontender, nondistended, obese   Extremities: trace rle edema, chronic venous stasis changes; Left below knee amputation   Vascular: 2+ peripheral pulses  Neurological: A/O x 3, no focal deficits    MEDICATIONS:  MEDICATIONS  (STANDING):  albuterol    0.083% 2.5 milliGRAM(s) Nebulizer four times a day  aspirin  chewable 81 milliGRAM(s) Oral daily  bisacodyl 5 milliGRAM(s) Oral every 12 hours  budesonide 160 MICROgram(s)/formoterol 4.5 MICROgram(s) Inhaler 2 Puff(s) Inhalation two times a day  busPIRone 10 milliGRAM(s) Oral two times a day  cefepime  Injectable.      cefepime  Injectable. 1000 milliGRAM(s) IV Push every 12 hours  cholecalciferol 2000 Unit(s) Oral at bedtime  cyanocobalamin 1000 MICROGram(s) Oral at bedtime  diltiazem    milliGRAM(s) Oral daily  doxazosin 1 milliGRAM(s) Oral at bedtime  enoxaparin Injectable 40 milliGRAM(s) SubCutaneous every 24 hours  furosemide   Injectable 40 milliGRAM(s) IV Push daily  gabapentin 400 milliGRAM(s) Oral three times a day  guaiFENesin ER 1200 milliGRAM(s) Oral every 12 hours  hemorrhoidal Ointment 1 Application(s) Rectal two times a day  melatonin 5 milliGRAM(s) Oral at bedtime  methylPREDNISolone sodium succinate Injectable 40 milliGRAM(s) IV Push every 8 hours  metoprolol tartrate 25 milliGRAM(s) Oral two times a day  pantoprazole    Tablet 40 milliGRAM(s) Oral before breakfast  QUEtiapine 100 milliGRAM(s) Oral at bedtime  spironolactone 25 milliGRAM(s) Oral daily  thiamine 100 milliGRAM(s) Oral at bedtime  tiotropium 2.5 MICROgram(s) Inhaler 2 Puff(s) Inhalation daily    MEDICATIONS  (PRN):  acetaminophen     Tablet .. 650 milliGRAM(s) Oral every 6 hours PRN Temp greater or equal to 38C (100.4F), Mild Pain (1 - 3)  aluminum hydroxide/magnesium hydroxide/simethicone Suspension 30 milliLiter(s) Oral every 4 hours PRN Dyspepsia  ondansetron Injectable 4 milliGRAM(s) IV Push every 8 hours PRN Nausea and/or Vomiting    LABS: All Labs Reviewed:                        12.7   12.66 )-----------( 162      ( 05 Jun 2023 07:52 )             38.4     06-05    129<L>  |  91<L>  |  26<H>  ----------------------------<  162<H>  4.5   |  35<H>  |  0.77    Ca    8.9      05 Jun 2023 07:52    RADIOLOGY/EKG:  < from: CT Chest No Cont (06.03.23 @ 17:01) >  FINDINGS:    LUNGS/AIRWAYS/PLEURA: Patent airways through the segmental bronchi. New   bilateral lower lobe predominant patchy consolidation, tree-in-bud, and   other small lung nodules. Unchanged 5 mm nodule in the left upper lobe   (2-47). Multiple calcified granulomas. No pleural effusion.    LYMPH NODES/MEDIASTINUM: Unchanged 1.7 cm right thyroid nodule. No   lymphadenopathy. Calcified lymph nodes compatible with prior   granulomatous disease.    HEART/VASCULATURE: Multichamber cardiac enlargement. Left atrial   appendage occlusion 6 device. Coronary artery calcifications. No   pericardial effusion. Normal caliber aorta and 4.2 cm midascending aorta.   Dilated pulmonary artery, larger than the ascending aorta.    UPPER ABDOMEN: Calcified granulomas in the spleen. Unchanged   hypoattenuating 3.3 cm lesion in the spleen.    BONES/SOFT TISSUES: No acute or suspicious abnormality.      IMPRESSION:    Since 5/27/2023:    New bilateral lower lobe predominant bronchiolitis and consolidation,   likely infectious.    Unchanged left upper lobe 5 mm nodule but new compared to more remote   studies. Recommend three-month follow-up.    < end of copied text >  < from: Xray Chest 1 View- PORTABLE-Urgent (Xray Chest 1 View- PORTABLE-Urgent .) (06.02.23 @ 12:39) >  INTERPRETATION:  AP erect chest on June 2, 2023 at 12:34 PM. Patient has   cough.    Gross heart enlargement again noted.    On May 27 this year there was mild CHF. This is somewhat improved at this   time.    IMPRESSION: Prior CHF is somewhat improved. Heart enlargement again noted.      < end of copied text >

## 2023-06-06 NOTE — PROGRESS NOTE ADULT - ASSESSMENT
69 y/o man with obesity, VIJAY non-adherent to CPAP, HTN, HFpEF, permanent afib s/p Watchman, mitral regurgitation, peripheral vascular disease s/p L BKA, COPD, pulmonary HTN, chronic resp failure on home O2 4L/min, alcoholic cirrhosis, presented with difficulty breathing, productive cough, wheezing. In the ED, he was afebrile, BP WNL, HR 100s-110s, SPO2 97% on 4L. Exam notable for increased resp effort, B/L wheeze. Labs with negative troponin, , lactate 2.2. EKG afib RVR. CXR with mild pulm edema. RVP PCR panel (+) for parainfluenza infection. He was given aspirin, acetaminophen, Lasix IV, Solumedrol and Duoneb, placed on NIPPV. He subsequently improved to a modest degree and was admitted to Medicine thereafter.     #Acute on chronic hypoxic and hypercapneic respiratory failure  -Multifactorial due to parainfluenza infection, unable to rule out viral pneumonia, present upon admission, VIJAY non-adherent to NIPPV, COPD with exacerbation, pulmonary HTN, and possibly acute on chronic diastolic CHF.   - Continue to treat underlying issues  - O2 supplementation (on continuous home O2)  - NIPPV with BIPAP at night    #HAP, likely GNR  -added cefepime 6/3  -obtain sputum cx  -chest PT    #Parainfluenza infection, unable to rule out viral pneumonia, present upon admission  -Still with dyspnea and cough but gradually improving  - Continue supportive care measures    #VIJAY  -Stable  - Continue to encourage NIPPV use    #COPD with exacerbation  -Wheeze on exam. No resp distress.   -check CT chest given lack of improvement, as above, with c/f PNA on 6/3  - Continue Solumedrol, now q12, symbicort, spiriva, standing nebs  -azithro completed  -add mucinex    #Acute on chronic diastolic CHF  -No angina. Troponin negative. ProBNP elevated to 1134. Initial EKG afib RVR which may have contributed to the CHF exacerbation.  - TTE done, report in paper chart  - Continue IV Lasix 40mg, now qd, spironolactone 25mg daily  -s/p dose metolazone 5/30  - Continue metoprolol   - f/u cards recs (Palla)    #Permanent afib, RVR  -HR suboptimally controlled in setting of parainfluenza infection. On metoprolol and diltizem CD. HR now somewhat improved, 80s-90s.  - Continue metoprolol and diltiazem CD  - Not on AC as patient has hx of Watchman placement  -ASA    #Pulmonary nodules in MICHELLE and RLL  -As noted on CT, repeat CT 6 wks    #Alcoholic cirrhosis  No encephalopathy. No ascites. Unknown as to presence of varices.   - Continue diuretics  -w splenic hypodensity, obtain nonemergent MRI to further eval as outpt    #Normocytic anemia  -Did have an episode of rectal bleeding prior to admission, suspected to be hemorrhoidal. He does have hx of R ischemic colitis in past and underlying cirrhosis / alcohol use disorder that could also be responsible for his chronic anemia. -No overt bleeding at this time.  - Continue to monitor,   - Per Dr Barker, outpatient EGD and colonoscopy once respiratory status improving     #Hypomagnesemia  -Improved with supplementation    #Vit D deficiency  -25-OH vit D 17 ng/mL.    - Ordered for Vit D replacement    #DVT ppx- SQL    DVT px: LMWH  Code status: Full    Updated daughter, Adela, [phone], on 6/5  Pt medically active, on iv diuresis, iv steroids, iv abx. D/c >48hrs

## 2023-06-06 NOTE — PROGRESS NOTE ADULT - ASSESSMENT
Acute on chronic respiratory failure with hypoxia and hypercapnia /  parainfluenza virus / VIJAY and COPD overlap syndrome / Obesity hypoventilation syndrome / pHTN / Hospital Aquired Pneumonia / Pulmonary Nodules     -Ct chest reviewed, will need f/u in 3 months 	  - patient now on 3L nasal canula  - continue duoneb/budesonide aerosol; changed to nebulizer   - On iv steroids, Solumedrol 40mg q 8  - noncompliant w BIPAP.  will try to wear at least 4 hours tonight.  - in afib w rate controlled  - Continue cefepime, started 6/3.  will send sputum culture.   - IV diuresis, cardiology following     Problem/Plan - 1:  ·  Problem: Acute on chronic respiratory failure with hypoxia and hypercapnia.   Problem/Plan - 2:  ·  Problem: Infection due to parainfluenza virus 3.   Problem/Plan - 3:  ·  Problem: VIJAY and COPD overlap syndrome.   Problem/Plan - 4:  ·  Problem: Acute bronchospasm.   Problem/Plan - 5:  ·  Problem: Pulmonary hypertension.   Problem/Plan - 6:  ·  Problem: Obesity hypoventilation syndrome.   Problem/Plan - 7:  ·  Problem: ILD (interstitial lung disease).   Problem/Plan - 8:  ·  Problem: Nodule of upper lobe of left lung.

## 2023-06-06 NOTE — PROGRESS NOTE ADULT - SUBJECTIVE AND OBJECTIVE BOX
HOSPITALIST ATTENDING PROGRESS NOTE    Chart and meds reviewed.  Patient seen and examined.    CC: SOB    Subjective: Denies CP, Still w cough and SOB.    All other systems reviewed and found to be negative with the exception of what has been described above.    MEDICATIONS  (STANDING):  albuterol    0.083% 2.5 milliGRAM(s) Nebulizer four times a day  albuterol/ipratropium for Nebulization 3 milliLiter(s) Nebulizer every 6 hours  aspirin  chewable 81 milliGRAM(s) Oral daily  bisacodyl 5 milliGRAM(s) Oral every 12 hours  buDESOnide    Inhalation Suspension 0.5 milliGRAM(s) Inhalation every 12 hours  busPIRone 10 milliGRAM(s) Oral two times a day  cefepime  Injectable.      cefepime  Injectable. 1000 milliGRAM(s) IV Push every 12 hours  cholecalciferol 2000 Unit(s) Oral at bedtime  cyanocobalamin 1000 MICROGram(s) Oral at bedtime  diltiazem    milliGRAM(s) Oral daily  doxazosin 1 milliGRAM(s) Oral at bedtime  enoxaparin Injectable 40 milliGRAM(s) SubCutaneous every 24 hours  furosemide   Injectable 40 milliGRAM(s) IV Push daily  gabapentin 400 milliGRAM(s) Oral three times a day  guaiFENesin ER 1200 milliGRAM(s) Oral every 12 hours  hemorrhoidal Ointment 1 Application(s) Rectal two times a day  melatonin 5 milliGRAM(s) Oral at bedtime  methylPREDNISolone sodium succinate Injectable 40 milliGRAM(s) IV Push every 8 hours  metoprolol tartrate 25 milliGRAM(s) Oral two times a day  pantoprazole    Tablet 40 milliGRAM(s) Oral before breakfast  QUEtiapine 100 milliGRAM(s) Oral at bedtime  spironolactone 25 milliGRAM(s) Oral daily  thiamine 100 milliGRAM(s) Oral at bedtime    MEDICATIONS  (PRN):  acetaminophen     Tablet .. 650 milliGRAM(s) Oral every 6 hours PRN Temp greater or equal to 38C (100.4F), Mild Pain (1 - 3)  aluminum hydroxide/magnesium hydroxide/simethicone Suspension 30 milliLiter(s) Oral every 4 hours PRN Dyspepsia  ondansetron Injectable 4 milliGRAM(s) IV Push every 8 hours PRN Nausea and/or Vomiting      VITALS:  T(F): 97.9 (06-06-23 @ 11:14), Max: 98.2 (06-05-23 @ 15:51)  HR: 64 (06-06-23 @ 11:14) (60 - 92)  BP: 109/58 (06-06-23 @ 11:14) (109/58 - 127/76)  RR: 18 (06-06-23 @ 11:14) (18 - 18)  SpO2: 96% (06-06-23 @ 11:14) (94% - 97%)  Wt(kg): --    I&O's Summary    05 Jun 2023 07:01  -  06 Jun 2023 07:00  --------------------------------------------------------  IN: 1130 mL / OUT: 3585 mL / NET: -2455 mL    06 Jun 2023 07:01  -  06 Jun 2023 15:42  --------------------------------------------------------  IN: 0 mL / OUT: 300 mL / NET: -300 mL        CAPILLARY BLOOD GLUCOSE          PHYSICAL EXAM:  Gen: NAD  HEENT:  pupils equal and reactive, EOMI, no oropharyngeal lesions, erythema, exudates, oral thrush  NECK:   supple, no carotid bruits, no palpable lymph nodes, no thyromegaly  CV:  +S1, +S2, regular, no murmurs or rubs  RESP:  good air entry, no wheeze, diffuse rhonchi  BREAST:  not examined  GI:  abdomen soft, non-tender, non-distended, normal BS, no bruits, no abdominal masses, no palpable masses  RECTAL:  not examined  :  not examined  MSK:   normal muscle tone, no atrophy, no rigidity, no contractions  EXT:  no clubbing, no cyanosis, no edema, no calf pain, swelling or erythema  VASCULAR:  pulses equal and symmetric in the upper and lower extremities  NEURO:  AAOX3, no focal neurological deficits, follows all commands, able to move extremities spontaneously  SKIN:  no ulcers, lesions or rashes    LABS:                            12.7   12.66 )-----------( 162      ( 05 Jun 2023 07:52 )             38.4     06-06    132<L>  |  93<L>  |  28<H>  ----------------------------<  156<H>  5.0   |  36<H>  |  0.93    Ca    8.8      06 Jun 2023 08:29    CULTURES:  Urine culture 5/27: Negative  Blood culture 5/27: Negative  Sputuc cx ordered    Imaging:  CT chest noncon 6/3/23: Since 5/27/2023: New bilateral lower lobe predominant bronchiolitis and consolidation, likely infectious. Unchanged left upper lobe 5 mm nodule but new compared to more remote studies. Recommend three-month follow-up.    CXR 6/2/23: Prior CHF is somewhat improved. Heart enlargement again noted.    XR ankle R 5/27: no fracture, dislocation, soft tissue swelling or joint effusion. Degenerative changes at the ankle joint. Small plantar and retrocalcaneal spurs. Degenerative changes of the talonavicular joint. No radiopaque foreign body.    US Duplex Venous Lower Ext Complete, Bilateral 5/27: No evidence of deep venous thrombosis in either lower extremity.    CT Abdomen and Pelvis WO 5/27: Cirrhotic liver with mild splenomegaly. No ascites. Mildly enlarged 3.1cm splenic hypodense low-attenuation lesion of unclear etiology, possibly a cyst. Consider abdominal MRI for further evaluation of these findings. No bowel distention seen.    CT Chest WO 5/27: 1. New 9 mm left upper lobe nodular opacity and nonspecific dependent groundglass may be infectious or inflammatory. Additional peripheral reticular opacities likely reflect component of chronic interstitial lung disease. Slightly enlarged 5 mm right lower lobe pulmonary nodule since 2020, of unclear significance. Follow-up chest CT is recommended in 6 weeks to evaluate for stability versus resolution of these findings. Cardiac enlargement, multivessel coronary artery calcification, and Watchman device noted. Significantly enlarged main pulmonary artery likely reflects pulmonary arterial hypertension.    Cardiac Testing:  EKG 5/27: Afib with RVR    Echo 5/30/23: EF 60%    Telemetry, personally reviewed:  5/31/23: afib , d/c tele

## 2023-06-07 LAB
ANION GAP SERPL CALC-SCNC: 4 MMOL/L — LOW (ref 5–17)
BUN SERPL-MCNC: 25 MG/DL — HIGH (ref 7–23)
CALCIUM SERPL-MCNC: 8.3 MG/DL — LOW (ref 8.5–10.1)
CHLORIDE SERPL-SCNC: 92 MMOL/L — LOW (ref 96–108)
CO2 SERPL-SCNC: 33 MMOL/L — HIGH (ref 22–31)
CREAT SERPL-MCNC: 1.03 MG/DL — SIGNIFICANT CHANGE UP (ref 0.5–1.3)
EGFR: 80 ML/MIN/1.73M2 — SIGNIFICANT CHANGE UP
GLUCOSE BLDC GLUCOMTR-MCNC: 141 MG/DL — HIGH (ref 70–99)
GLUCOSE SERPL-MCNC: 180 MG/DL — HIGH (ref 70–99)
GRAM STN FLD: SIGNIFICANT CHANGE UP
POTASSIUM SERPL-MCNC: 4.8 MMOL/L — SIGNIFICANT CHANGE UP (ref 3.5–5.3)
POTASSIUM SERPL-SCNC: 4.8 MMOL/L — SIGNIFICANT CHANGE UP (ref 3.5–5.3)
SODIUM SERPL-SCNC: 129 MMOL/L — LOW (ref 135–145)
SPECIMEN SOURCE: SIGNIFICANT CHANGE UP

## 2023-06-07 PROCEDURE — 99232 SBSQ HOSP IP/OBS MODERATE 35: CPT

## 2023-06-07 RX ADMIN — GABAPENTIN 400 MILLIGRAM(S): 400 CAPSULE ORAL at 06:02

## 2023-06-07 RX ADMIN — GABAPENTIN 400 MILLIGRAM(S): 400 CAPSULE ORAL at 21:50

## 2023-06-07 RX ADMIN — Medication 2000 UNIT(S): at 21:50

## 2023-06-07 RX ADMIN — SPIRONOLACTONE 25 MILLIGRAM(S): 25 TABLET, FILM COATED ORAL at 11:23

## 2023-06-07 RX ADMIN — TIOTROPIUM BROMIDE 2 PUFF(S): 18 CAPSULE ORAL; RESPIRATORY (INHALATION) at 08:11

## 2023-06-07 RX ADMIN — Medication 40 MILLIGRAM(S): at 06:02

## 2023-06-07 RX ADMIN — ALBUTEROL 2.5 MILLIGRAM(S): 90 AEROSOL, METERED ORAL at 13:22

## 2023-06-07 RX ADMIN — QUETIAPINE FUMARATE 100 MILLIGRAM(S): 200 TABLET, FILM COATED ORAL at 21:51

## 2023-06-07 RX ADMIN — Medication 650 MILLIGRAM(S): at 00:31

## 2023-06-07 RX ADMIN — Medication 10 MILLIGRAM(S): at 21:50

## 2023-06-07 RX ADMIN — Medication 650 MILLIGRAM(S): at 01:52

## 2023-06-07 RX ADMIN — CEFEPIME 1000 MILLIGRAM(S): 1 INJECTION, POWDER, FOR SOLUTION INTRAMUSCULAR; INTRAVENOUS at 21:49

## 2023-06-07 RX ADMIN — Medication 81 MILLIGRAM(S): at 11:23

## 2023-06-07 RX ADMIN — GABAPENTIN 400 MILLIGRAM(S): 400 CAPSULE ORAL at 15:50

## 2023-06-07 RX ADMIN — PREGABALIN 1000 MICROGRAM(S): 225 CAPSULE ORAL at 21:51

## 2023-06-07 RX ADMIN — Medication 5 MILLIGRAM(S): at 21:50

## 2023-06-07 RX ADMIN — Medication 40 MILLIGRAM(S): at 21:48

## 2023-06-07 RX ADMIN — Medication 25 MILLIGRAM(S): at 21:51

## 2023-06-07 RX ADMIN — CEFEPIME 1000 MILLIGRAM(S): 1 INJECTION, POWDER, FOR SOLUTION INTRAMUSCULAR; INTRAVENOUS at 12:37

## 2023-06-07 RX ADMIN — Medication 1200 MILLIGRAM(S): at 11:24

## 2023-06-07 RX ADMIN — ENOXAPARIN SODIUM 40 MILLIGRAM(S): 100 INJECTION SUBCUTANEOUS at 21:49

## 2023-06-07 RX ADMIN — ALBUTEROL 2.5 MILLIGRAM(S): 90 AEROSOL, METERED ORAL at 21:36

## 2023-06-07 RX ADMIN — Medication 180 MILLIGRAM(S): at 11:24

## 2023-06-07 RX ADMIN — Medication 25 MILLIGRAM(S): at 11:24

## 2023-06-07 RX ADMIN — Medication 40 MILLIGRAM(S): at 12:37

## 2023-06-07 RX ADMIN — Medication 0.5 MILLIGRAM(S): at 21:36

## 2023-06-07 RX ADMIN — ALBUTEROL 2.5 MILLIGRAM(S): 90 AEROSOL, METERED ORAL at 16:07

## 2023-06-07 RX ADMIN — PHENYLEPHRINE-SHARK LIVER OIL-MINERAL OIL-PETROLATUM RECTAL OINTMENT 1 APPLICATION(S): at 21:51

## 2023-06-07 RX ADMIN — Medication 650 MILLIGRAM(S): at 18:58

## 2023-06-07 RX ADMIN — PANTOPRAZOLE SODIUM 40 MILLIGRAM(S): 20 TABLET, DELAYED RELEASE ORAL at 06:02

## 2023-06-07 RX ADMIN — Medication 10 MILLIGRAM(S): at 11:23

## 2023-06-07 RX ADMIN — Medication 0.5 MILLIGRAM(S): at 08:10

## 2023-06-07 RX ADMIN — Medication 1 MILLIGRAM(S): at 21:50

## 2023-06-07 RX ADMIN — Medication 100 MILLIGRAM(S): at 21:50

## 2023-06-07 RX ADMIN — Medication 1200 MILLIGRAM(S): at 21:51

## 2023-06-07 RX ADMIN — Medication 5 MILLIGRAM(S): at 11:24

## 2023-06-07 RX ADMIN — ALBUTEROL 2.5 MILLIGRAM(S): 90 AEROSOL, METERED ORAL at 08:10

## 2023-06-07 NOTE — CHART NOTE - NSCHARTNOTEFT_GEN_A_CORE
notified by nurse for concerns of RLE cellulitis     evaluated pt at bedside, RLE mid shin/ calf region feels warm and erythema, with some small wounds     per patient, the above condition is chronic, erythema getting better already     questionable cellulits, no immediate intervention needed overnight, will make day team be aware
Pt instructed PEP therapy use and purpose.
notified by nurse for lethargy AM     evalauted pt at bedside, pt looks sleepy with eyes closed.   A &O x4   S1, S2 present   Lung coarse sound b/l lung, with patient's body habitus and lethargy, unable to assess accurately  abdomen non tender      ordered ABG shows retained CO2 with pH 7.31  Nurse spoke with ICU PA to follow up with the consult during the day. They said since patient refused bipap, not much can be done on their end.     will notify day team

## 2023-06-07 NOTE — PROGRESS NOTE ADULT - SUBJECTIVE AND OBJECTIVE BOX
HOSPITALIST ATTENDING PROGRESS NOTE    Chart and meds reviewed.  Patient seen and examined.    CC: SOB    Subjective: Continued cough, SOB.     All other systems reviewed and found to be negative with the exception of what has been described above.    MEDICATIONS  (STANDING):  albuterol    0.083% 2.5 milliGRAM(s) Nebulizer four times a day  aspirin  chewable 81 milliGRAM(s) Oral daily  bisacodyl 5 milliGRAM(s) Oral every 12 hours  buDESOnide    Inhalation Suspension 0.5 milliGRAM(s) Inhalation every 12 hours  busPIRone 10 milliGRAM(s) Oral two times a day  cefepime  Injectable.      cefepime  Injectable. 1000 milliGRAM(s) IV Push every 12 hours  cholecalciferol 2000 Unit(s) Oral at bedtime  cyanocobalamin 1000 MICROGram(s) Oral at bedtime  diltiazem    milliGRAM(s) Oral daily  doxazosin 1 milliGRAM(s) Oral at bedtime  enoxaparin Injectable 40 milliGRAM(s) SubCutaneous every 24 hours  furosemide   Injectable 40 milliGRAM(s) IV Push daily  gabapentin 400 milliGRAM(s) Oral three times a day  guaiFENesin ER 1200 milliGRAM(s) Oral every 12 hours  hemorrhoidal Ointment 1 Application(s) Rectal two times a day  melatonin 5 milliGRAM(s) Oral at bedtime  methylPREDNISolone sodium succinate Injectable 40 milliGRAM(s) IV Push every 12 hours  metoprolol tartrate 25 milliGRAM(s) Oral two times a day  pantoprazole    Tablet 40 milliGRAM(s) Oral before breakfast  QUEtiapine 100 milliGRAM(s) Oral at bedtime  spironolactone 25 milliGRAM(s) Oral daily  thiamine 100 milliGRAM(s) Oral at bedtime  tiotropium 2.5 MICROgram(s) Inhaler 2 Puff(s) Inhalation daily    MEDICATIONS  (PRN):  acetaminophen     Tablet .. 650 milliGRAM(s) Oral every 6 hours PRN Temp greater or equal to 38C (100.4F), Mild Pain (1 - 3)  aluminum hydroxide/magnesium hydroxide/simethicone Suspension 30 milliLiter(s) Oral every 4 hours PRN Dyspepsia  ondansetron Injectable 4 milliGRAM(s) IV Push every 8 hours PRN Nausea and/or Vomiting      VITALS:  T(F): 97.7 (06-07-23 @ 15:08), Max: 98.2 (06-06-23 @ 22:53)  HR: 73 (06-07-23 @ 15:08) (70 - 97)  BP: 133/75 (06-07-23 @ 15:08) (113/69 - 141/65)  RR: 18 (06-07-23 @ 15:08) (17 - 18)  SpO2: 98% (06-07-23 @ 15:08) (95% - 98%)  Wt(kg): --    I&O's Summary    06 Jun 2023 07:01  -  07 Jun 2023 07:00  --------------------------------------------------------  IN: 0 mL / OUT: 300 mL / NET: -300 mL        CAPILLARY BLOOD GLUCOSE          PHYSICAL EXAM:  Gen: NAD  HEENT:  pupils equal and reactive, EOMI, no oropharyngeal lesions, erythema, exudates, oral thrush  NECK:   supple, no carotid bruits, no palpable lymph nodes, no thyromegaly  CV:  +S1, +S2, regular, no murmurs or rubs  RESP:   fair air mvmnt, scattered wheeze  BREAST:  not examined  GI:  abdomen soft, non-tender, non-distended, normal BS, no bruits, no abdominal masses, no palpable masses  RECTAL:  not examined  :  not examined  MSK:   normal muscle tone, no atrophy, no rigidity, no contractions  EXT:  + amputation  VASCULAR:  pulses equal and symmetric in the upper and lower extremities  NEURO:  AAOX3, no focal neurological deficits, follows all commands, able to move extremities spontaneously  SKIN:  no ulcers, lesions or rashes    LABS:        06-07    129<L>  |  92<L>  |  25<H>  ----------------------------<  180<H>  4.8   |  33<H>  |  1.03    Ca    8.3<L>      07 Jun 2023 09:20    CULTURES:  Urine culture 5/27: Negative  Blood culture 5/27: Negative  Sputuc cx 6/6 pending    Imaging:  CT chest noncon 6/3/23: Since 5/27/2023: New bilateral lower lobe predominant bronchiolitis and consolidation, likely infectious. Unchanged left upper lobe 5 mm nodule but new compared to more remote studies. Recommend three-month follow-up.    CXR 6/2/23: Prior CHF is somewhat improved. Heart enlargement again noted.    XR ankle R 5/27: no fracture, dislocation, soft tissue swelling or joint effusion. Degenerative changes at the ankle joint. Small plantar and retrocalcaneal spurs. Degenerative changes of the talonavicular joint. No radiopaque foreign body.    US Duplex Venous Lower Ext Complete, Bilateral 5/27: No evidence of deep venous thrombosis in either lower extremity.    CT Abdomen and Pelvis WO 5/27: Cirrhotic liver with mild splenomegaly. No ascites. Mildly enlarged 3.1cm splenic hypodense low-attenuation lesion of unclear etiology, possibly a cyst. Consider abdominal MRI for further evaluation of these findings. No bowel distention seen.    CT Chest WO 5/27: 1. New 9 mm left upper lobe nodular opacity and nonspecific dependent groundglass may be infectious or inflammatory. Additional peripheral reticular opacities likely reflect component of chronic interstitial lung disease. Slightly enlarged 5 mm right lower lobe pulmonary nodule since 2020, of unclear significance. Follow-up chest CT is recommended in 6 weeks to evaluate for stability versus resolution of these findings. Cardiac enlargement, multivessel coronary artery calcification, and Watchman device noted. Significantly enlarged main pulmonary artery likely reflects pulmonary arterial hypertension.    Cardiac Testing:  EKG 5/27: Afib with RVR    Echo 5/30/23: EF 60%    Telemetry, personally reviewed:  5/31/23: afib , d/c tele

## 2023-06-07 NOTE — PROGRESS NOTE ADULT - ASSESSMENT
Acute on chronic respiratory failure with hypoxia and hypercapnia /  parainfluenza virus / VIJAY and COPD overlap syndrome / Obesity hypoventilation syndrome / pHTN / Hospital Aquired Pneumonia / Pulmonary Nodules     - CT chest reviewed, will need f/u in 3 months 	  - patient now on 3L nasal canula  - continue duoneb/budesonide aerosol; changed to nebulizer   - On iv steroids, Solumedrol 40mg q 12  - noncompliant w BIPAP.  will try to wear at least 4 hours tonight.  - in afib w rate controlled  - Continue cefepime, started 6/3.  Sputum culture as above   - IV diuresis, cardiology following     Problem/Plan - 1:  ·  Problem: Acute on chronic respiratory failure with hypoxia and hypercapnia.   Problem/Plan - 2:  ·  Problem: Infection due to parainfluenza virus 3.   Problem/Plan - 3:  ·  Problem: VIJAY and COPD overlap syndrome.   Problem/Plan - 4:  ·  Problem: Acute bronchospasm.   Problem/Plan - 5:  ·  Problem: Pulmonary hypertension.   Problem/Plan - 6:  ·  Problem: Obesity hypoventilation syndrome.   Problem/Plan - 7:  ·  Problem: ILD (interstitial lung disease).   Problem/Plan - 8:  ·  Problem: Nodule of upper lobe of left lung.

## 2023-06-07 NOTE — PROGRESS NOTE ADULT - ASSESSMENT
67 y/o man with obesity, VIJAY non-adherent to CPAP, HTN, HFpEF, permanent afib s/p Watchman, mitral regurgitation, peripheral vascular disease s/p L BKA, COPD, pulmonary HTN, chronic resp failure on home O2 4L/min, alcoholic cirrhosis, presented with difficulty breathing, productive cough, wheezing. In the ED, he was afebrile, BP WNL, HR 100s-110s, SPO2 97% on 4L. Exam notable for increased resp effort, B/L wheeze. Labs with negative troponin, , lactate 2.2. EKG afib RVR. CXR with mild pulm edema. RVP PCR panel (+) for parainfluenza infection. He was given aspirin, acetaminophen, Lasix IV, Solumedrol and Duoneb, placed on NIPPV. He subsequently improved to a modest degree and was admitted to Medicine thereafter.     #Acute on chronic hypoxic and hypercapneic respiratory failure  -Multifactorial due to parainfluenza infection, unable to rule out viral pneumonia, present upon admission, VIJAY non-adherent to NIPPV, COPD with exacerbation, pulmonary HTN, and possibly acute on chronic diastolic CHF.   - Continue to treat underlying issues  - O2 supplementation (on continuous home O2)  - NIPPV with BIPAP at night    #HAP, likely GNR  -added cefepime 6/3  -sputum cx pending  -chest PT w acapella    #Parainfluenza infection, unable to rule out viral pneumonia, present upon admission  -Still with dyspnea and cough but gradually improving  - Continue supportive care measures    #VIJAY  -Stable  - Continue to encourage NIPPV use    #COPD with exacerbation  -Wheeze on exam. No resp distress.   -check CT chest given lack of improvement, as above, with c/f PNA on 6/3  - Continue Solumedrol, now q12 as of 6/6, symbicort, spiriva, standing nebs  -azithro completed  -add mucinex    #Acute on chronic diastolic CHF  -No angina. Troponin negative. ProBNP elevated to 1134. Initial EKG afib RVR which may have contributed to the CHF exacerbation.  - TTE done, report in paper chart  - Continue IV Lasix 40mg, now qd, spironolactone 25mg daily  -s/p dose metolazone 5/30  - Continue metoprolol   - f/u cards recs ([facility])    #Permanent afib, RVR  -HR suboptimally controlled in setting of parainfluenza infection. On metoprolol and diltizem CD. HR now somewhat improved, 80s-90s.  - Continue metoprolol and diltiazem CD  - Not on AC as patient has hx of Watchman placement  -ASA    #Pulmonary nodules in MICHELLE and RLL  -As noted on CT, repeat CT 6 wks    #Alcoholic cirrhosis  No encephalopathy. No ascites. Unknown as to presence of varices.   - Continue diuretics  -w splenic hypodensity, obtain nonemergent MRI to further eval as outpt    #Normocytic anemia  -Did have an episode of rectal bleeding prior to admission, suspected to be hemorrhoidal. He does have hx of R ischemic colitis in past and underlying cirrhosis / alcohol use disorder that could also be responsible for his chronic anemia. -No overt bleeding at this time.  - Continue to monitor,   - Per Dr Barker, outpatient EGD and colonoscopy once respiratory status improving     #Hypomagnesemia  -Improved with supplementation    #Vit D deficiency  -25-OH vit D 17 ng/mL.    - Ordered for Vit D replacement    #DVT ppx- SQL    DVT px: LMWH  Code status: Full    Updated daughter, Adela, [phone], on 6/5  Pt medically active, on iv diuresis, iv steroids, iv abx. D/c >48hrs

## 2023-06-07 NOTE — PROGRESS NOTE ADULT - SUBJECTIVE AND OBJECTIVE BOX
CHIEF COMPLAINT: insomnia     SUBJECTIVE: 6/7/23 seen and examined in bed, remains on 5L supplemental oxygen, reports feeling better, no resp distress, + insomnia     REVIEW OF SYSTEMS:  CONSTITUTIONAL: No weakness, fevers or chills  RESPIRATORY: No cough, wheezing, hemoptysis; + exertional shortness of breath  CARDIOVASCULAR: No chest pain or palpitations  GASTROINTESTINAL: No abdominal or epigastric pain. No nausea, vomiting   NEUROLOGICAL: No numbness or weakness  All other review of systems is negative unless indicated above    Vital Signs Last 24 Hrs  T(C): 36.4 (07 Jun 2023 07:46), Max: 36.8 (06 Jun 2023 22:53)  T(F): 97.5 (07 Jun 2023 07:46), Max: 98.2 (06 Jun 2023 22:53)  HR: 71 (07 Jun 2023 07:46) (64 - 97)  BP: 120/50 (07 Jun 2023 07:46) (109/58 - 141/65)  RR: 17 (07 Jun 2023 07:46) (17 - 18)  SpO2: 98% (07 Jun 2023 07:46) (95% - 98%)    Parameters below as of 07 Jun 2023 07:46  Patient On (Oxygen Delivery Method): nasal cannula    PHYSICAL EXAM:  Constitutional: NAD, awake and alert  HEENT: EOMI  Neck: Soft and supple  Respiratory: Bilateral scattered rhonchi cleared with cough, wheezing improving and fine bibasilar crackles.  Cardiovascular: S1 and S2, regular rate and rhythm  Gastrointestinal: Bowel Sounds present, soft, nontender, nondistended, obese   Extremities: trace rle edema, chronic venous stasis changes; Left below knee amputation   Vascular: 2+ peripheral pulses  Neurological: A/O x 3, no focal deficits    MEDICATIONS:  MEDICATIONS  (STANDING):  albuterol    0.083% 2.5 milliGRAM(s) Nebulizer four times a day  aspirin  chewable 81 milliGRAM(s) Oral daily  bisacodyl 5 milliGRAM(s) Oral every 12 hours  buDESOnide    Inhalation Suspension 0.5 milliGRAM(s) Inhalation every 12 hours  busPIRone 10 milliGRAM(s) Oral two times a day  cefepime  Injectable.      cefepime  Injectable. 1000 milliGRAM(s) IV Push every 12 hours  cholecalciferol 2000 Unit(s) Oral at bedtime  cyanocobalamin 1000 MICROGram(s) Oral at bedtime  diltiazem    milliGRAM(s) Oral daily  doxazosin 1 milliGRAM(s) Oral at bedtime  enoxaparin Injectable 40 milliGRAM(s) SubCutaneous every 24 hours  furosemide   Injectable 40 milliGRAM(s) IV Push daily  gabapentin 400 milliGRAM(s) Oral three times a day  guaiFENesin ER 1200 milliGRAM(s) Oral every 12 hours  hemorrhoidal Ointment 1 Application(s) Rectal two times a day  melatonin 5 milliGRAM(s) Oral at bedtime  methylPREDNISolone sodium succinate Injectable 40 milliGRAM(s) IV Push every 12 hours  metoprolol tartrate 25 milliGRAM(s) Oral two times a day  pantoprazole    Tablet 40 milliGRAM(s) Oral before breakfast  QUEtiapine 100 milliGRAM(s) Oral at bedtime  spironolactone 25 milliGRAM(s) Oral daily  thiamine 100 milliGRAM(s) Oral at bedtime  tiotropium 2.5 MICROgram(s) Inhaler 2 Puff(s) Inhalation daily    MEDICATIONS  (PRN):  acetaminophen     Tablet .. 650 milliGRAM(s) Oral every 6 hours PRN Temp greater or equal to 38C (100.4F), Mild Pain (1 - 3)  aluminum hydroxide/magnesium hydroxide/simethicone Suspension 30 milliLiter(s) Oral every 4 hours PRN Dyspepsia  ondansetron Injectable 4 milliGRAM(s) IV Push every 8 hours PRN Nausea and/or Vomiting    LABS: All Labs Reviewed:                        12.7   12.66 )-----------( 162      ( 05 Jun 2023 07:52 )             38.4     06-06    132<L>  |  93<L>  |  28<H>  ----------------------------<  156<H>  5.0   |  36<H>  |  0.93    Ca    8.8      06 Jun 2023 08:29    MICRO:  Culture - Sputum . (06.06.23 @ 19:38) Few polymorphonuclear leukocytes per low power field Few Squamous epithelial cells per low power field Numerous Yeast like cells seen per oil power field Few Gram positive cocci in pairs seen per oil power field Moderate Gram Variable Rods seenper oil power field Specimen Source: .Sputum Sputum  RADIOLOGY:  < from: CT Chest No Cont (06.03.23 @ 17:01) >  FINDINGS:  LUNGS/AIRWAYS/PLEURA: Patent airways through the segmental bronchi. New bilateral lower lobe predominant patchy consolidation, tree-in-bud, and other small lung nodules. Unchanged 5 mm nodule in the left upper lobe   (2-47). Multiple calcified granulomas. No pleural effusion.  LYMPH NODES/MEDIASTINUM: Unchanged 1.7 cm right thyroid nodule. No lymphadenopathy. Calcified lymph nodes compatible with prior granulomatous disease.  HEART/VASCULATURE: Multichamber cardiac enlargement. Left atrial appendage occlusion 6 device. Coronary artery calcifications. No pericardial effusion. Normal caliber aorta and 4.2 cm midascending aorta. Dilated pulmonary artery, larger than the ascending aorta.  UPPER ABDOMEN: Calcified granulomas in the spleen. Unchanged hypoattenuating 3.3 cm lesion in the spleen.  BONES/SOFT TISSUES: No acute or suspicious abnormality.  IMPRESSION:  Since 5/27/2023:  New bilateral lower lobe predominant bronchiolitis and consolidation, likely infectious.  Unchanged left upper lobe 5 mm nodule but new compared to more remote studies. Recommend three-month follow-up.  < end of copied text >    < from: Xray Chest 1 View- PORTABLE-Urgent (Xray Chest 1 View- PORTABLE-Urgent .) (06.02.23 @ 12:39) >  INTERPRETATION:  AP erect chest on June 2, 2023 at 12:34 PM. Patient has cough.  Gross heart enlargement again noted.  On May 27 this year there was mild CHF. This is somewhat improved at this time.  IMPRESSION: Prior CHF is somewhat improved. Heart enlargement again noted.  < end of copied text >

## 2023-06-08 LAB
ANION GAP SERPL CALC-SCNC: 2 MMOL/L — LOW (ref 5–17)
BUN SERPL-MCNC: 26 MG/DL — HIGH (ref 7–23)
CALCIUM SERPL-MCNC: 8.8 MG/DL — SIGNIFICANT CHANGE UP (ref 8.5–10.1)
CHLORIDE SERPL-SCNC: 95 MMOL/L — LOW (ref 96–108)
CO2 SERPL-SCNC: 34 MMOL/L — HIGH (ref 22–31)
CREAT SERPL-MCNC: 0.9 MG/DL — SIGNIFICANT CHANGE UP (ref 0.5–1.3)
CULTURE RESULTS: SIGNIFICANT CHANGE UP
EGFR: 94 ML/MIN/1.73M2 — SIGNIFICANT CHANGE UP
GLUCOSE SERPL-MCNC: 145 MG/DL — HIGH (ref 70–99)
POTASSIUM SERPL-MCNC: 4.6 MMOL/L — SIGNIFICANT CHANGE UP (ref 3.5–5.3)
POTASSIUM SERPL-SCNC: 4.6 MMOL/L — SIGNIFICANT CHANGE UP (ref 3.5–5.3)
SARS-COV-2 RNA SPEC QL NAA+PROBE: SIGNIFICANT CHANGE UP
SODIUM SERPL-SCNC: 131 MMOL/L — LOW (ref 135–145)
SPECIMEN SOURCE: SIGNIFICANT CHANGE UP

## 2023-06-08 PROCEDURE — 99232 SBSQ HOSP IP/OBS MODERATE 35: CPT

## 2023-06-08 RX ADMIN — ALBUTEROL 2.5 MILLIGRAM(S): 90 AEROSOL, METERED ORAL at 08:47

## 2023-06-08 RX ADMIN — Medication 20 MILLIGRAM(S): at 22:31

## 2023-06-08 RX ADMIN — Medication 0.5 MILLIGRAM(S): at 21:15

## 2023-06-08 RX ADMIN — Medication 10 MILLIGRAM(S): at 22:29

## 2023-06-08 RX ADMIN — Medication 2000 UNIT(S): at 22:30

## 2023-06-08 RX ADMIN — SPIRONOLACTONE 25 MILLIGRAM(S): 25 TABLET, FILM COATED ORAL at 10:06

## 2023-06-08 RX ADMIN — Medication 40 MILLIGRAM(S): at 06:07

## 2023-06-08 RX ADMIN — Medication 650 MILLIGRAM(S): at 22:29

## 2023-06-08 RX ADMIN — Medication 1 MILLIGRAM(S): at 22:31

## 2023-06-08 RX ADMIN — ALBUTEROL 2.5 MILLIGRAM(S): 90 AEROSOL, METERED ORAL at 21:14

## 2023-06-08 RX ADMIN — Medication 5 MILLIGRAM(S): at 22:30

## 2023-06-08 RX ADMIN — ALBUTEROL 2.5 MILLIGRAM(S): 90 AEROSOL, METERED ORAL at 12:02

## 2023-06-08 RX ADMIN — Medication 0.5 MILLIGRAM(S): at 08:47

## 2023-06-08 RX ADMIN — ALBUTEROL 2.5 MILLIGRAM(S): 90 AEROSOL, METERED ORAL at 15:54

## 2023-06-08 RX ADMIN — GABAPENTIN 400 MILLIGRAM(S): 400 CAPSULE ORAL at 06:07

## 2023-06-08 RX ADMIN — ENOXAPARIN SODIUM 40 MILLIGRAM(S): 100 INJECTION SUBCUTANEOUS at 22:29

## 2023-06-08 RX ADMIN — Medication 25 MILLIGRAM(S): at 10:06

## 2023-06-08 RX ADMIN — GABAPENTIN 400 MILLIGRAM(S): 400 CAPSULE ORAL at 14:02

## 2023-06-08 RX ADMIN — Medication 180 MILLIGRAM(S): at 10:05

## 2023-06-08 RX ADMIN — Medication 650 MILLIGRAM(S): at 10:08

## 2023-06-08 RX ADMIN — TIOTROPIUM BROMIDE 2 PUFF(S): 18 CAPSULE ORAL; RESPIRATORY (INHALATION) at 08:47

## 2023-06-08 RX ADMIN — PREGABALIN 1000 MICROGRAM(S): 225 CAPSULE ORAL at 22:31

## 2023-06-08 RX ADMIN — Medication 100 MILLIGRAM(S): at 22:31

## 2023-06-08 RX ADMIN — Medication 20 MILLIGRAM(S): at 14:02

## 2023-06-08 RX ADMIN — QUETIAPINE FUMARATE 100 MILLIGRAM(S): 200 TABLET, FILM COATED ORAL at 22:30

## 2023-06-08 RX ADMIN — Medication 1200 MILLIGRAM(S): at 22:30

## 2023-06-08 RX ADMIN — Medication 5 MILLIGRAM(S): at 10:05

## 2023-06-08 RX ADMIN — GABAPENTIN 400 MILLIGRAM(S): 400 CAPSULE ORAL at 22:30

## 2023-06-08 RX ADMIN — Medication 10 MILLIGRAM(S): at 10:05

## 2023-06-08 RX ADMIN — Medication 25 MILLIGRAM(S): at 22:30

## 2023-06-08 RX ADMIN — Medication 1200 MILLIGRAM(S): at 10:05

## 2023-06-08 RX ADMIN — PANTOPRAZOLE SODIUM 40 MILLIGRAM(S): 20 TABLET, DELAYED RELEASE ORAL at 06:06

## 2023-06-08 RX ADMIN — Medication 650 MILLIGRAM(S): at 23:46

## 2023-06-08 RX ADMIN — Medication 81 MILLIGRAM(S): at 10:06

## 2023-06-08 NOTE — PROGRESS NOTE ADULT - ASSESSMENT
67 y/o man with obesity, VIJAY non-adherent to CPAP, HTN, HFpEF, permanent afib s/p Watchman, mitral regurgitation, peripheral vascular disease s/p L BKA, COPD, pulmonary HTN, chronic resp failure on home O2 4L/min, alcoholic cirrhosis, presented with difficulty breathing, productive cough, wheezing. In the ED, he was afebrile, BP WNL, HR 100s-110s, SPO2 97% on 4L. Exam notable for increased resp effort, B/L wheeze. Labs with negative troponin, , lactate 2.2. EKG afib RVR. CXR with mild pulm edema. RVP PCR panel (+) for parainfluenza infection. He was given aspirin, acetaminophen, Lasix IV, Solumedrol and Duoneb, placed on NIPPV. He subsequently improved to a modest degree and was admitted to Medicine thereafter.     #Acute on chronic hypoxic and hypercapneic respiratory failure  -Multifactorial due to parainfluenza infection, unable to rule out viral pneumonia, present upon admission, VIJAY non-adherent to NIPPV, COPD with exacerbation, pulmonary HTN, and possibly acute on chronic diastolic CHF.   - Continue to treat underlying issues  - O2 supplementation (on continuous home O2)  - NIPPV with BIPAP at night    #HAP, likely GNR  -added cefepime 6/3, completed 5d course  -sputum cx normal raymundo  -chest PT w acapella    #Parainfluenza infection, unable to rule out viral pneumonia, present upon admission  -Still with dyspnea and cough but gradually improving  - Continue supportive care measures    #VIJAY  -Stable  - Continue to encourage NIPPV use    #COPD with exacerbation  -Wheeze on exam. No resp distress.   -check CT chest given lack of improvement, as above, with c/f PNA on 6/3  - Continue Solumedrol, now 20 q12 as of 6/6, likely change to oral steroids tmrw, will need taper  -symbicort, spiriva, standing nebs  -azithro completed  -add mucinex    #Acute on chronic diastolic CHF  -No angina. Troponin negative. ProBNP elevated to 1134. Initial EKG afib RVR which may have contributed to the CHF exacerbation.  - TTE done, report in paper chart  - Continue IV Lasix 40mg, now qd, spironolactone 25mg daily  -s/p dose metolazone 5/30  - Continue metoprolol   - f/u cards recs (Palla)    #Permanent afib, RVR  -HR suboptimally controlled in setting of parainfluenza infection. On metoprolol and diltizem CD. HR now somewhat improved, 80s-90s.  - Continue metoprolol and diltiazem CD  - Not on AC as patient has hx of Watchman placement  -ASA    #Pulmonary nodules in MICHELLE and RLL  -As noted on CT, repeat CT 6 wks    #Alcoholic cirrhosis  No encephalopathy. No ascites. Unknown as to presence of varices.   - Continue diuretics  -w splenic hypodensity, obtain nonemergent MRI to further eval as outpt    #Normocytic anemia  -Did have an episode of rectal bleeding prior to admission, suspected to be hemorrhoidal. He does have hx of R ischemic colitis in past and underlying cirrhosis / alcohol use disorder that could also be responsible for his chronic anemia. -No overt bleeding at this time.  - Continue to monitor,   - Per Dr Barker, outpatient EGD and colonoscopy once respiratory status improving     #Hypomagnesemia  -Improved with supplementation    #Vit D deficiency  -25-OH vit D 17 ng/mL.    - Ordered for Vit D replacement    #DVT ppx- SQL    DVT px: LMWH  Code status: Full    Updated daughter, Adela, [phone], on 6/5  Pt medically active, on iv diuresis, iv steroids, iv abx. D/c >48hrs     69 y/o man with obesity, VIJAY non-adherent to CPAP, HTN, HFpEF, permanent afib s/p Watchman, mitral regurgitation, peripheral vascular disease s/p L BKA, COPD, pulmonary HTN, chronic resp failure on home O2 4L/min, alcoholic cirrhosis, presented with difficulty breathing, productive cough, wheezing. In the ED, he was afebrile, BP WNL, HR 100s-110s, SPO2 97% on 4L. Exam notable for increased resp effort, B/L wheeze. Labs with negative troponin, , lactate 2.2. EKG afib RVR. CXR with mild pulm edema. RVP PCR panel (+) for parainfluenza infection. He was given aspirin, acetaminophen, Lasix IV, Solumedrol and Duoneb, placed on NIPPV. He subsequently improved to a modest degree and was admitted to Medicine thereafter.     #Acute on chronic hypoxic and hypercapneic respiratory failure  -Multifactorial due to parainfluenza infection, unable to rule out viral pneumonia, present upon admission, VIJAY non-adherent to NIPPV, COPD with exacerbation, pulmonary HTN, and possibly acute on chronic diastolic CHF.   - Continue to treat underlying issues  - O2 supplementation (on continuous home O2)  - NIPPV with BIPAP at night    #HAP, likely GNR  -added cefepime 6/3, completed 5d course  -sputum cx normal raymundo  -chest PT w acapella    #Parainfluenza infection, unable to rule out viral pneumonia, present upon admission  -Still with dyspnea and cough but gradually improving  - Continue supportive care measures    #VIJAY  -Stable  - Continue to encourage NIPPV use    #COPD with exacerbation  -Wheeze on exam. No resp distress.   -check CT chest given lack of improvement, as above, with c/f PNA on 6/3  - Continue Solumedrol, now 20 q12 as of 6/6, likely change to oral steroids tmrw, will need taper on d/c  -symbicort, spiriva, standing nebs  -azithro completed  -add mucinex    #Acute on chronic diastolic CHF  -No angina. Troponin negative. ProBNP elevated to 1134. Initial EKG afib RVR which may have contributed to the CHF exacerbation.  - TTE done, report in paper chart  - Continue IV Lasix 40mg, now qd, spironolactone 25mg daily  -s/p dose metolazone 5/30  - Continue metoprolol   - f/u cards recs (Palla)    #Permanent afib, RVR  -HR suboptimally controlled in setting of parainfluenza infection. On metoprolol and diltizem CD. HR now somewhat improved, 80s-90s.  - Continue metoprolol and diltiazem CD  - Not on AC as patient has hx of Watchman placement  -ASA    #Pulmonary nodules in MICHELLE and RLL  -As noted on CT, repeat CT 6 wks    #Alcoholic cirrhosis  No encephalopathy. No ascites. Unknown as to presence of varices.   - Continue diuretics  -w splenic hypodensity, obtain nonemergent MRI to further eval as outpt    #Normocytic anemia  -Did have an episode of rectal bleeding prior to admission, suspected to be hemorrhoidal. He does have hx of R ischemic colitis in past and underlying cirrhosis / alcohol use disorder that could also be responsible for his chronic anemia. -No overt bleeding at this time.  - Continue to monitor,   - Per Dr Barker, outpatient EGD and colonoscopy once respiratory status improving     #Hypomagnesemia  -Improved with supplementation    #Vit D deficiency  -25-OH vit D 17 ng/mL.    - Ordered for Vit D replacement    #DVT ppx- SQL    DVT px: LMWH  Code status: Full    Updated daughter, Adela, [phone], on 6/5  Possible d/c 6/9, completed iv abx, need to transition to po steroids

## 2023-06-08 NOTE — PROGRESS NOTE ADULT - PROVIDER SPECIALTY LIST ADULT
Hospitalist
Pulmonology
Hospitalist
Pulmonology
Pulmonology
Hospitalist
Pulmonology
Pulmonology
Hospitalist
Hospitalist
Cardiology
Pulmonology
Pulmonology

## 2023-06-08 NOTE — PROGRESS NOTE ADULT - SUBJECTIVE AND OBJECTIVE BOX
CHIEF COMPLAINT: insomnia     SUBJECTIVE: 6/8/23 seen and examined in bed, remains on 4L supplemental oxygen, reports feeling better, no resp distress, + insomnia. Refusing BiPAP.     REVIEW OF SYSTEMS:  CONSTITUTIONAL: No weakness, fevers or chills  RESPIRATORY: No cough, wheezing, hemoptysis; + exertional shortness of breath  CARDIOVASCULAR: No chest pain or palpitations  GASTROINTESTINAL: No abdominal or epigastric pain. No nausea, vomiting   NEUROLOGICAL: No numbness or weakness  All other review of systems is negative unless indicated above    Vital Signs Last 24 Hrs  T(C): 36.6 (08 Jun 2023 08:06), Max: 36.9 (07 Jun 2023 21:15)  T(F): 97.9 (08 Jun 2023 08:06), Max: 98.4 (07 Jun 2023 21:15)  HR: 61 (08 Jun 2023 08:06) (61 - 91)  BP: 110/83 (08 Jun 2023 08:06) (110/83 - 144/74)  RR: 18 (08 Jun 2023 08:06) (18 - 18)  SpO2: 92% (08 Jun 2023 08:06) (92% - 98%)    Parameters below as of 08 Jun 2023 08:06  Patient On (Oxygen Delivery Method): nasal cannula    PHYSICAL EXAM:  Constitutional: NAD, awake and alert  HEENT: EOMI  Neck: Soft and supple  Respiratory: fine bibasilar crackles, no wheezing, no rhonchi   Cardiovascular: S1 and S2, regular rate and rhythm  Gastrointestinal: Bowel Sounds present, soft, nontender, nondistended, obese   Extremities: +1 rle edema, chronic venous stasis changes; Left below knee amputation   Vascular: 2+ peripheral pulses  Neurological: A/O x 3, no focal deficits    MEDICATIONS:  MEDICATIONS  (STANDING):  albuterol    0.083% 2.5 milliGRAM(s) Nebulizer four times a day  aspirin  chewable 81 milliGRAM(s) Oral daily  bisacodyl 5 milliGRAM(s) Oral every 12 hours  buDESOnide    Inhalation Suspension 0.5 milliGRAM(s) Inhalation every 12 hours  busPIRone 10 milliGRAM(s) Oral two times a day  cholecalciferol 2000 Unit(s) Oral at bedtime  cyanocobalamin 1000 MICROGram(s) Oral at bedtime  diltiazem    milliGRAM(s) Oral daily  doxazosin 1 milliGRAM(s) Oral at bedtime  enoxaparin Injectable 40 milliGRAM(s) SubCutaneous every 24 hours  furosemide   Injectable 40 milliGRAM(s) IV Push daily  gabapentin 400 milliGRAM(s) Oral three times a day  guaiFENesin ER 1200 milliGRAM(s) Oral every 12 hours  hemorrhoidal Ointment 1 Application(s) Rectal two times a day  melatonin 5 milliGRAM(s) Oral at bedtime  methylPREDNISolone sodium succinate Injectable 40 milliGRAM(s) IV Push every 12 hours  metoprolol tartrate 25 milliGRAM(s) Oral two times a day  pantoprazole    Tablet 40 milliGRAM(s) Oral before breakfast  QUEtiapine 100 milliGRAM(s) Oral at bedtime  spironolactone 25 milliGRAM(s) Oral daily  thiamine 100 milliGRAM(s) Oral at bedtime  tiotropium 2.5 MICROgram(s) Inhaler 2 Puff(s) Inhalation daily    MEDICATIONS  (PRN):  acetaminophen     Tablet .. 650 milliGRAM(s) Oral every 6 hours PRN Temp greater or equal to 38C (100.4F), Mild Pain (1 - 3)  aluminum hydroxide/magnesium hydroxide/simethicone Suspension 30 milliLiter(s) Oral every 4 hours PRN Dyspepsia  ondansetron Injectable 4 milliGRAM(s) IV Push every 8 hours PRN Nausea and/or Vomiting      LABS: All Labs Reviewed:                        12.7   12.66 )-----------( 162      ( 05 Jun 2023 07:52 )             38.4     06-08    131<L>  |  95<L>  |  26<H>  ----------------------------<  145<H>  4.6   |  34<H>  |  0.90    Ca    8.8      08 Jun 2023 06:42    MICRO:  Culture - Sputum . (06.06.23 @ 19:38) Few polymorphonuclear leukocytes per low power field Few Squamous epithelial cells per low power field Numerous Yeast like cells seen per oil power field Few Gram positive cocci in pairs seen per oil power field Moderate Gram Variable Rods seenper oil power field Specimen Source: .Sputum    RADIOLOGY:  < from: CT Chest No Cont (06.03.23 @ 17:01) >  FINDINGS:  LUNGS/AIRWAYS/PLEURA: Patent airways through the segmental bronchi. New bilateral lower lobe predominant patchy consolidation, tree-in-bud, and other small lung nodules. Unchanged 5 mm nodule in the left upper lobe   (2-47). Multiple calcified granulomas. No pleural effusion.  LYMPH NODES/MEDIASTINUM: Unchanged 1.7 cm right thyroid nodule. No lymphadenopathy. Calcified lymph nodes compatible with prior granulomatous disease.  HEART/VASCULATURE: Multichamber cardiac enlargement. Left atrial appendage occlusion 6 device. Coronary artery calcifications. No pericardial effusion. Normal caliber aorta and 4.2 cm midascending aorta. Dilated pulmonary artery, larger than the ascending aorta.  UPPER ABDOMEN: Calcified granulomas in the spleen. Unchanged hypoattenuating 3.3 cm lesion in the spleen.  BONES/SOFT TISSUES: No acute or suspicious abnormality.  IMPRESSION:  Since 5/27/2023:  New bilateral lower lobe predominant bronchiolitis and consolidation, likely infectious.  Unchanged left upper lobe 5 mm nodule but new compared to more remote studies. Recommend three-month follow-up.  < end of copied text >    < from: Xray Chest 1 View- PORTABLE-Urgent (Xray Chest 1 View- PORTABLE-Urgent .) (06.02.23 @ 12:39) >  INTERPRETATION:  AP erect chest on June 2, 2023 at 12:34 PM. Patient has cough.  Gross heart enlargement again noted.  On May 27 this year there was mild CHF. This is somewhat improved at this time.  IMPRESSION: Prior CHF is somewhat improved. Heart enlargement again noted.  < end of copied text >

## 2023-06-08 NOTE — PROGRESS NOTE ADULT - ASSESSMENT
Acute on chronic respiratory failure with hypoxia and hypercapnia /  parainfluenza virus / VIJAY and COPD overlap syndrome / Obesity hypoventilation syndrome / pHTN / Hospital Aquired Pneumonia / Pulmonary Nodules     - CT chest reviewed, will need f/u in 3 months 	  - patient now on 4L nasal canula  - continue duoneb/budesonide aerosol; changed to nebulizer   - On iv steroids, taper Solumedrol now to 20mg q 12, will need PO Prednisone on DC   - noncompliant w BIPAP.  will try to wear at least 4 hours tonight.  - in afib w rate controlled  - On cefepime, started 6/3.  Sputum culture as above   - IV diuresis, cardiology following     Problem/Plan - 1:  ·  Problem: Acute on chronic respiratory failure with hypoxia and hypercapnia.   Problem/Plan - 2:  ·  Problem: Infection due to parainfluenza virus 3.   Problem/Plan - 3:  ·  Problem: VIJAY and COPD overlap syndrome.   Problem/Plan - 4:  ·  Problem: Acute bronchospasm.   Problem/Plan - 5:  ·  Problem: Pulmonary hypertension.   Problem/Plan - 6:  ·  Problem: Obesity hypoventilation syndrome.   Problem/Plan - 7:  ·  Problem: ILD (interstitial lung disease).   Problem/Plan - 8:  ·  Problem: Nodule of upper lobe of left lung.

## 2023-06-08 NOTE — PROGRESS NOTE ADULT - SUBJECTIVE AND OBJECTIVE BOX
HOSPITALIST ATTENDING PROGRESS NOTE    Chart and meds reviewed.  Patient seen and examined.    CC: SOB    Subjective: Reports feeling breathing is improved, some cough. Wound to leg today. Hopeful for d/c 6/9.    All other systems reviewed and found to be negative with the exception of what has been described above.    MEDICATIONS  (STANDING):  albuterol    0.083% 2.5 milliGRAM(s) Nebulizer four times a day  aspirin  chewable 81 milliGRAM(s) Oral daily  bisacodyl 5 milliGRAM(s) Oral every 12 hours  buDESOnide    Inhalation Suspension 0.5 milliGRAM(s) Inhalation every 12 hours  busPIRone 10 milliGRAM(s) Oral two times a day  cholecalciferol 2000 Unit(s) Oral at bedtime  cyanocobalamin 1000 MICROGram(s) Oral at bedtime  diltiazem    milliGRAM(s) Oral daily  doxazosin 1 milliGRAM(s) Oral at bedtime  enoxaparin Injectable 40 milliGRAM(s) SubCutaneous every 24 hours  furosemide   Injectable 40 milliGRAM(s) IV Push daily  gabapentin 400 milliGRAM(s) Oral three times a day  guaiFENesin ER 1200 milliGRAM(s) Oral every 12 hours  hemorrhoidal Ointment 1 Application(s) Rectal two times a day  melatonin 5 milliGRAM(s) Oral at bedtime  methylPREDNISolone sodium succinate Injectable 20 milliGRAM(s) IV Push every 12 hours  metoprolol tartrate 25 milliGRAM(s) Oral two times a day  pantoprazole    Tablet 40 milliGRAM(s) Oral before breakfast  QUEtiapine 100 milliGRAM(s) Oral at bedtime  spironolactone 25 milliGRAM(s) Oral daily  thiamine 100 milliGRAM(s) Oral at bedtime  tiotropium 2.5 MICROgram(s) Inhaler 2 Puff(s) Inhalation daily    MEDICATIONS  (PRN):  acetaminophen     Tablet .. 650 milliGRAM(s) Oral every 6 hours PRN Temp greater or equal to 38C (100.4F), Mild Pain (1 - 3)  aluminum hydroxide/magnesium hydroxide/simethicone Suspension 30 milliLiter(s) Oral every 4 hours PRN Dyspepsia  ondansetron Injectable 4 milliGRAM(s) IV Push every 8 hours PRN Nausea and/or Vomiting      VITALS:  T(F): 97.3 (06-08-23 @ 15:12), Max: 98.4 (06-07-23 @ 21:15)  HR: 67 (06-08-23 @ 15:12) (61 - 91)  BP: 123/63 (06-08-23 @ 15:12) (110/83 - 144/74)  RR: 18 (06-08-23 @ 15:12) (18 - 18)  SpO2: 91% (06-08-23 @ 15:12) (91% - 98%)  Wt(kg): --    I&O's Summary    07 Jun 2023 07:01  -  08 Jun 2023 07:00  --------------------------------------------------------  IN: 0 mL / OUT: 1150 mL / NET: -1150 mL        CAPILLARY BLOOD GLUCOSE      POCT Blood Glucose.: 141 mg/dL (07 Jun 2023 21:45)      PHYSICAL EXAM:  Gen: NAD  HEENT:  pupils equal and reactive, EOMI, no oropharyngeal lesions, erythema, exudates, oral thrush  NECK:   supple, no carotid bruits, no palpable lymph nodes, no thyromegaly  CV:  +S1, +S2, regular, no murmurs or rubs  RESP:   lungs clear to auscultation bilaterally, no wheezing, rales, rhonchi, good air entry bilaterally  BREAST:  not examined  GI:  abdomen soft, non-tender, non-distended, normal BS, no bruits, no abdominal masses, no palpable masses  RECTAL:  not examined  :  not examined  MSK:   normal muscle tone, no atrophy, no rigidity, no contractions  EXT:  no clubbing, no cyanosis, no edema, no calf pain, swelling or erythema  VASCULAR:  pulses equal and symmetric in the upper and lower extremities  NEURO:  AAOX3, no focal neurological deficits, follows all commands, able to move extremities spontaneously  SKIN:  no ulcers, lesions or rashes    LABS:        06-08    131<L>  |  95<L>  |  26<H>  ----------------------------<  145<H>  4.6   |  34<H>  |  0.90    Ca    8.8      08 Jun 2023 06:42    CULTURES:  Urine culture 5/27: Negative  Blood culture 5/27: Negative  Sputuc cx 6/6 normal raymundo    Imaging:  CT chest noncon 6/3/23: Since 5/27/2023: New bilateral lower lobe predominant bronchiolitis and consolidation, likely infectious. Unchanged left upper lobe 5 mm nodule but new compared to more remote studies. Recommend three-month follow-up.    CXR 6/2/23: Prior CHF is somewhat improved. Heart enlargement again noted.    XR ankle R 5/27: no fracture, dislocation, soft tissue swelling or joint effusion. Degenerative changes at the ankle joint. Small plantar and retrocalcaneal spurs. Degenerative changes of the talonavicular joint. No radiopaque foreign body.    US Duplex Venous Lower Ext Complete, Bilateral 5/27: No evidence of deep venous thrombosis in either lower extremity.    CT Abdomen and Pelvis WO 5/27: Cirrhotic liver with mild splenomegaly. No ascites. Mildly enlarged 3.1cm splenic hypodense low-attenuation lesion of unclear etiology, possibly a cyst. Consider abdominal MRI for further evaluation of these findings. No bowel distention seen.    CT Chest WO 5/27: 1. New 9 mm left upper lobe nodular opacity and nonspecific dependent groundglass may be infectious or inflammatory. Additional peripheral reticular opacities likely reflect component of chronic interstitial lung disease. Slightly enlarged 5 mm right lower lobe pulmonary nodule since 2020, of unclear significance. Follow-up chest CT is recommended in 6 weeks to evaluate for stability versus resolution of these findings. Cardiac enlargement, multivessel coronary artery calcification, and Watchman device noted. Significantly enlarged main pulmonary artery likely reflects pulmonary arterial hypertension.    Cardiac Testing:  EKG 5/27: Afib with RVR    Echo 5/30/23: EF 60%    Telemetry, personally reviewed:  5/31/23: afib , d/c tele

## 2023-06-08 NOTE — PROGRESS NOTE ADULT - REASON FOR ADMISSION
dyspnea, cough, congestion
dyspnea, cough, congestion
Acute on chronic respiratory failure with hypoxia and hypercapnea  Parainfluenza infection  COPD with exacerbation  Afib with RVR
dyspnea, cough, congestion

## 2023-06-09 ENCOUNTER — TRANSCRIPTION ENCOUNTER (OUTPATIENT)
Age: 68
End: 2023-06-09

## 2023-06-09 VITALS — HEART RATE: 96 BPM | SYSTOLIC BLOOD PRESSURE: 114 MMHG | DIASTOLIC BLOOD PRESSURE: 70 MMHG

## 2023-06-09 LAB
ANION GAP SERPL CALC-SCNC: 3 MMOL/L — LOW (ref 5–17)
BUN SERPL-MCNC: 28 MG/DL — HIGH (ref 7–23)
CALCIUM SERPL-MCNC: 8.4 MG/DL — LOW (ref 8.5–10.1)
CHLORIDE SERPL-SCNC: 95 MMOL/L — LOW (ref 96–108)
CO2 SERPL-SCNC: 33 MMOL/L — HIGH (ref 22–31)
CREAT SERPL-MCNC: 0.96 MG/DL — SIGNIFICANT CHANGE UP (ref 0.5–1.3)
EGFR: 87 ML/MIN/1.73M2 — SIGNIFICANT CHANGE UP
GLUCOSE SERPL-MCNC: 136 MG/DL — HIGH (ref 70–99)
POTASSIUM SERPL-MCNC: 4.2 MMOL/L — SIGNIFICANT CHANGE UP (ref 3.5–5.3)
POTASSIUM SERPL-SCNC: 4.2 MMOL/L — SIGNIFICANT CHANGE UP (ref 3.5–5.3)
SODIUM SERPL-SCNC: 131 MMOL/L — LOW (ref 135–145)

## 2023-06-09 PROCEDURE — 99239 HOSP IP/OBS DSCHRG MGMT >30: CPT

## 2023-06-09 RX ORDER — DOXAZOSIN MESYLATE 4 MG
1 TABLET ORAL
Qty: 30 | Refills: 0
Start: 2023-06-09

## 2023-06-09 RX ORDER — DOXAZOSIN MESYLATE 4 MG
1 TABLET ORAL
Qty: 0 | Refills: 0 | DISCHARGE

## 2023-06-09 RX ORDER — DOXAZOSIN MESYLATE 4 MG
1 TABLET ORAL
Qty: 0 | Refills: 0 | DISCHARGE
Start: 2023-06-09

## 2023-06-09 RX ORDER — FUROSEMIDE 40 MG
40 TABLET ORAL DAILY
Refills: 0 | Status: DISCONTINUED | OUTPATIENT
Start: 2023-06-09 | End: 2023-06-09

## 2023-06-09 RX ORDER — DILTIAZEM HCL 120 MG
1 CAPSULE, EXT RELEASE 24 HR ORAL
Qty: 0 | Refills: 0 | DISCHARGE

## 2023-06-09 RX ORDER — TIOTROPIUM BROMIDE 18 UG/1
2 CAPSULE ORAL; RESPIRATORY (INHALATION)
Qty: 0 | Refills: 0 | DISCHARGE
Start: 2023-06-09

## 2023-06-09 RX ORDER — DILTIAZEM HCL 120 MG
1 CAPSULE, EXT RELEASE 24 HR ORAL
Qty: 0 | Refills: 0 | DISCHARGE
Start: 2023-06-09

## 2023-06-09 RX ADMIN — Medication 40 MILLIGRAM(S): at 05:56

## 2023-06-09 RX ADMIN — PANTOPRAZOLE SODIUM 40 MILLIGRAM(S): 20 TABLET, DELAYED RELEASE ORAL at 05:56

## 2023-06-09 RX ADMIN — TIOTROPIUM BROMIDE 2 PUFF(S): 18 CAPSULE ORAL; RESPIRATORY (INHALATION) at 08:12

## 2023-06-09 RX ADMIN — Medication 20 MILLIGRAM(S): at 11:04

## 2023-06-09 RX ADMIN — Medication 0.5 MILLIGRAM(S): at 08:10

## 2023-06-09 RX ADMIN — Medication 10 MILLIGRAM(S): at 10:10

## 2023-06-09 RX ADMIN — Medication 5 MILLIGRAM(S): at 10:11

## 2023-06-09 RX ADMIN — Medication 180 MILLIGRAM(S): at 10:11

## 2023-06-09 RX ADMIN — Medication 25 MILLIGRAM(S): at 10:10

## 2023-06-09 RX ADMIN — GABAPENTIN 400 MILLIGRAM(S): 400 CAPSULE ORAL at 13:07

## 2023-06-09 RX ADMIN — Medication 81 MILLIGRAM(S): at 10:11

## 2023-06-09 RX ADMIN — PHENYLEPHRINE-SHARK LIVER OIL-MINERAL OIL-PETROLATUM RECTAL OINTMENT 1 APPLICATION(S): at 10:09

## 2023-06-09 RX ADMIN — Medication 1200 MILLIGRAM(S): at 10:10

## 2023-06-09 RX ADMIN — GABAPENTIN 400 MILLIGRAM(S): 400 CAPSULE ORAL at 05:56

## 2023-06-09 RX ADMIN — ALBUTEROL 2.5 MILLIGRAM(S): 90 AEROSOL, METERED ORAL at 08:10

## 2023-06-09 RX ADMIN — SPIRONOLACTONE 25 MILLIGRAM(S): 25 TABLET, FILM COATED ORAL at 10:11

## 2023-06-09 RX ADMIN — Medication 40 MILLIGRAM(S): at 10:18

## 2023-06-09 RX ADMIN — ALBUTEROL 2.5 MILLIGRAM(S): 90 AEROSOL, METERED ORAL at 11:23

## 2023-06-09 NOTE — DISCHARGE NOTE PROVIDER - NSDCCAREPROVSEEN_GEN_ALL_CORE_FT
Wicho Mckenna (Pulmonary Medicine)  Jennifer Durán (Hospital Medicine)  Rasheeda Hackett (Hospital Medicine)  Dominick Middleton (Cardiology)  Palla, Venugopal R (Cardiology)  John Miller (Hospital Medicine)  Abilio Ochoa (Pulmonary Medicine)  Lloyd Pierce (Pulmonary Medicine)

## 2023-06-09 NOTE — DISCHARGE NOTE PROVIDER - NSDCFUSCHEDAPPT_GEN_ALL_CORE_FT
Wicho Mckenna  [facility] Physician [facility]  [facility] [address]  Scheduled Appointment: 07/12/2023    Augusta Juarez  [facility]  [facility] [address]  Scheduled Appointment: 08/24/2023

## 2023-06-09 NOTE — DISCHARGE NOTE PROVIDER - HOSPITAL COURSE
69 y/o man with obesity, VIJAY non-adherent to CPAP, HTN, HFpEF, permanent afib s/p Watchman, mitral regurgitation, peripheral vascular disease s/p L BKA, COPD, pulmonary HTN, chronic resp failure on home O2 4L/min, alcoholic cirrhosis, presented with difficulty breathing, productive cough, wheezing. In the ED, he was afebrile, BP WNL, HR 100s-110s, SPO2 97% on 4L. Exam notable for increased resp effort, B/L wheeze. Labs with negative troponin, , lactate 2.2. EKG afib RVR. CXR with mild pulm edema. RVP PCR panel (+) for parainfluenza infection. He was given aspirin, acetaminophen, Lasix IV, Solumedrol and Duoneb, placed on NIPPV with subsequent improvement, admitted to Medicine thereafter.     Acute on chronic hypoxic and hypercapneic respiratory failure  Multifactorial due to parainfluenza infection, unable to rule out pneumonia, SA non-adherent to NIPPV, COPD with exacerbation, pulmonary HTN, and possibly acute on chronic diastolic CHF. Underlying issues were treated as described below. Patient is now improved. Stable for discharge to home ([facility]). Continue home O2. NIPPV use encouraged but patient non-adherent.     Unable to rule out pneumonia, unable to rule out Gram-negative organism  Unable to determine whether present upon admission or developed during the admission. CT chest 6/3 concerning for developing pneumonia. Cefepime was added that day and he completed 5 day course while here. Sputum culture with normal raymundo. Chest PT was provided by Acapella device.    Parainfluenza infection, unable to rule out viral pneumonia, present upon admission  Improved with supportive care measures.     COPD with exacerbation  Improved with IV steroids, now transitioned to PO prednisone, plan to taper upon discharge. Continue Symbicort BID (patient says he was erroneously taking only daily rather than BID). Added Spiriva. Albuterol as needed. Outpatient follow up with Dr Mckenna.     VIJAY  Stable. Encouraged NIPPV use.     Acute on chronic diastolic CHF  No angina. Troponin negative. Pro-BNP elevated to 1134. Initial EKG a-fib RVR which may have contributed to the CHF exacerbation. Echo with preserved LVEF 60%, moderate MR/TR, mild LVH, no effusions. Improved with IV Lasix as well as a dose of metolazone, now on PO Lasix. Also on spironolactone 25mg daily. Metoprolol. Outpatient follow up with Dr. Middleton. Avoid added salt in diet. Limit fluid intake to 1.5L per day. Monitor weight.     Permanent afib, RVR  HR was suboptimally controlled in setting of parainfluenza infection. On metoprolol and diltiazem CD. Increased diltiazem to 180mg daily this admission. Infection now better. HR improved, 80s. Continue metoprolol 25mg BID, diltiazem CD 180mg daily. Not on AC as patient has hx of Watchman placement.    Pulmonary nodules in MICHELLE and RLL  As noted on CT, repeat CT 6 wks    Alcoholic cirrhosis  No encephalopathy. No ascites. Unknown as to presence of varices. Continue diuretics. Splenic hypodensity noted on CT. Outpatient follow up advised.    Chronic hyponatremia  Stable. In setting of CHF and cirrhosis. Mentation at baseline.     Normocytic anemia  Did have an episode of rectal bleeding prior to admission, suspected to be hemorrhoidal. He does have hx of R ischemic colitis in past and underlying cirrhosis / alcohol use disorder that could also be responsible for his chronic anemia. No overt bleeding this admission. Per Dr Barker, can follow up outpatient for possible EGD and colonoscopy.     Hypomagnesemia  Resolved with supplementation    Vit D deficiency  25-OH vit D 17 ng/mL. Continue Vit D replacement

## 2023-06-09 NOTE — DISCHARGE NOTE PROVIDER - NSDCMRMEDTOKEN_GEN_ALL_CORE_FT
albuterol 90 mcg/inh inhalation aerosol: 2 puff(s) inhaled every 4 hours, As Needed for wheezing  Aspirin Enteric Coated 81 mg oral delayed release tablet: 1 tab(s) orally once a day  budesonide-formoterol 160 mcg-4.5 mcg/inh inhalation aerosol: 2 puff(s) inhaled 2 times a day  busPIRone 10 mg oral tablet: 1 tab(s) orally 2 times a day  cholecalciferol oral tablet: 1 orally once a day  Colace 100 mg oral capsule: 2 cap(s) orally once a day  cyanocobalamin 1000 mcg oral tablet: 1 tab(s) orally once a day  dilTIAZem 180 mg/24 hours oral capsule, extended release: 1 cap(s) orally once a day  doxazosin 1 mg oral tablet: 1 tab(s) orally once a day (at bedtime)  furosemide 20 mg oral tablet: 3 tab(s) orally 2 times a day  gabapentin 400 mg oral capsule: 1 cap(s) orally 3 times a day  Metoprolol Tartrate 25 mg oral tablet: 1 tab(s) orally 2 times a day  omeprazole 40 mg oral delayed release capsule: 1 cap(s) orally once a day  predniSONE 20 mg oral tablet: 2 tab(s) orally once a day Take 2 tab daily for 3 days then 1.5 tab daily for 3 days then 1 tab daily for 3 days then 0.5 tab daily for 3 days  psyllium oral capsule: 2 cap(s) orally once a day  QUEtiapine 50 mg oral tablet: 2 tab(s) orally once a day (at bedtime)  spironolactone 25 mg oral tablet: 1 tab(s) orally once a day  thiamine 100 mg oral tablet: 1 tab(s) orally once a day  tiotropium 2.5 mcg/inh inhalation aerosol: 2 puff(s) inhaled once a day   albuterol 90 mcg/inh inhalation aerosol: 2 puff(s) inhaled every 4 hours, As Needed for wheezing  Aspirin Enteric Coated 81 mg oral delayed release tablet: 1 tab(s) orally once a day  budesonide-formoterol 160 mcg-4.5 mcg/inh inhalation aerosol: 2 puff(s) inhaled 2 times a day  busPIRone 10 mg oral tablet: 1 tab(s) orally 2 times a day  cholecalciferol oral tablet: 1 tablet orally once a day  Colace 100 mg oral capsule: 2 cap(s) orally once a day  cyanocobalamin 1000 mcg oral tablet: 1 tab(s) orally once a day  dilTIAZem 180 mg/24 hours oral capsule, extended release: 1 cap(s) orally once a day  doxazosin 1 mg oral tablet: 1 tab(s) orally once a day (at bedtime)  furosemide 20 mg oral tablet: 3 tab(s) orally 2 times a day  gabapentin 400 mg oral capsule: 1 cap(s) orally 3 times a day  Metoprolol Tartrate 25 mg oral tablet: 1 tab(s) orally 2 times a day  omeprazole 40 mg oral delayed release capsule: 1 cap(s) orally once a day  predniSONE 20 mg oral tablet: 2 tab(s) orally once a day Take 2 tab daily for 3 days then 1.5 tab daily for 3 days then 1 tab daily for 3 days then 0.5 tab daily for 3 days  psyllium oral capsule: 2 cap(s) orally once a day  QUEtiapine 50 mg oral tablet: 2 tab(s) orally once a day (at bedtime)  spironolactone 25 mg oral tablet: 1 tab(s) orally once a day  thiamine 100 mg oral tablet: 1 tab(s) orally once a day  tiotropium 2.5 mcg/inh inhalation aerosol: 2 puff(s) inhaled once a day

## 2023-06-09 NOTE — DISCHARGE NOTE PROVIDER - REASON FOR ADMISSION
dyspnea, cough, congestion Acute on chronic respiratory failure multi factorial due to Parainfluenza infection, pneumonia, COPD exacerbation, rapid a-fib, CHF, underlying pulmonary HTN   Acute on chronic respiratory failure multi factorial due to Parainfluenza infection, pneumonia, COPD exacerbation, rapid a-fib, CHF, underlying pulmonary HTN

## 2023-06-09 NOTE — DISCHARGE NOTE PROVIDER - NSDCCPCAREPLAN_GEN_ALL_CORE_FT
PRINCIPAL DISCHARGE DIAGNOSIS  Diagnosis: Acute on chronic respiratory failure  Assessment and Plan of Treatment: You were treated in the hospital for shortness of breath, wheeze, cough, multi factorial due to parainfluenza infection, pneumonia, COPD with exacerbation, acute on chronic diastolic congestive heart failure, and underlying obstructive sleep apnea and pulmonary hypertension. Underlying issues were treated as described below. Reassuringly, you are now improved, stable for discharge to home. Continue home O2. NIPPV (CPAP/BiPAP) use encouraged.      SECONDARY DISCHARGE DIAGNOSES  Diagnosis: Pneumonia  Assessment and Plan of Treatment: Completed 5-day course of intravenous antibiotic Cefepime.    Diagnosis: Parainfluenza infection  Assessment and Plan of Treatment: Improved with supportive care measures.    Diagnosis: COPD with exacerbation  Assessment and Plan of Treatment: Improved with intravenous steroids, now transitioned to PO prednisone, plan to taper upon discharge as instructed. Continue Symbicort twice a day. Spiriva once daily. Albuterol as needed. Outpatient follow up with Dr Mckenna.    Diagnosis: VIJAY (obstructive sleep apnea)  Assessment and Plan of Treatment: Stable. Encouraged NIPPV use.    Diagnosis: Acute on chronic diastolic congestive heart failure  Assessment and Plan of Treatment: No sign of acute coronary syndrome (angina, heart attack). CHF exacerbation may have been from rapid atrial fibrillation in the setting of your infection. Echocardiogram with preserved left ventricle function, moderate mitral valve regurgitation. You were treated with intravenous diuretic furosemide as well as a dose of diuretic metolazone. Now improved. Continue on oral furosemide, spironolactone and metoprolol. Outpatient follow up with Dr. Middleton. Avoid added salt in diet. Limit fluid intake to 1.5L per day. Monitor weight.    Diagnosis: Atrial fibrillation with rapid ventricular response  Assessment and Plan of Treatment: Heart rate was elevated upon admission, in setting of parainfluenza infection, despite home regimen of metoprolol and diltiazem CD. Increased diltiazem to 180mg daily this admission. Infection now better. HR improved, 80s. Continue metoprolol 25mg BID, diltiazem CD 180mg daily. Not on anticoagulation as you had Watchman procedure in past.    Diagnosis: Pulmonary nodules  Assessment and Plan of Treatment: As noted on chest cat scan, repeat cat scan in 6 weeks to assess for interval change.    Diagnosis: Cirrhosis  Assessment and Plan of Treatment: No encephalopathy. No ascites. Unknown as to presence of varices. Continue diuretics. Splenic hypodensity noted on CT. Outpatient follow up advised. Follow up with GI Dr. Barker for upper endoscopy.    Diagnosis: Hyponatremia  Assessment and Plan of Treatment: Stable, mildly low sodium concentration in the blood, in setting of congestive heart failure and cirrhosis. Continue to monitor as outpatient.    Diagnosis: Normocytic anemia  Assessment and Plan of Treatment: Etiology unclear. You do have history of past rectal bleeding, suspected to have been hemorrhoidal. Also, history of ischemic colitis. Lastly,  underlying cirrhosis. All could be responsible for your anemia. Reassuringly, no significant bleeding this admission. You were seen and evaluated by Gastroenterology (GI) who advised outpatient follow up for possible upper endoscopy and colonoscopy if needed. Follow up with GI Dr. Barker.    Diagnosis: Hypomagnesemia  Assessment and Plan of Treatment: Low blood magnesium level. Normalized with magnesium supplementation.    Diagnosis: Vitamin D deficiency  Assessment and Plan of Treatment: 25-hydroxy vitamin D level = 17. Continue vitamin D supplementation.

## 2023-06-09 NOTE — DISCHARGE NOTE PROVIDER - PROVIDER TOKENS
PROVIDER:[TOKEN:[[id number]],FOLLOWUP:[2 weeks]],PROVIDER:[TOKEN:[[id number]],FOLLOWUP:[2 weeks]],PROVIDER:[TOKEN:[[id number]],FOLLOWUP:[2 weeks]],PROVIDER:[TOKEN:[[id number]],FOLLOWUP:[2 weeks]]

## 2023-06-09 NOTE — DISCHARGE NOTE PROVIDER - CARE PROVIDER_API CALL
Wicho Mckenna  Pulmonary Disease  [address] [facility] [address]  [place] [address]  Phone: [phone]  Fax: [phone]  Follow Up Time: 2 weeks    Dominick Middleton)  Cardiology  [address] [place] [address]  Phone: [phone]  Fax: [phone]  Follow Up Time: 2 weeks    Mitchell Barker  Gastroenterology  [address] [place] [address]  Phone: [phone]  Fax: [phone]  Follow Up Time: 2 weeks    Augusta Vasquez [facility]  [address] [place] [address]  Phone: [phone]  Fax: [phone]  Follow Up Time: 2 weeks

## 2023-06-09 NOTE — DISCHARGE NOTE PROVIDER - NSDCPNSUBOBJ_GEN_ALL_CORE
Interval Hx:  Patient seen and examined. Feels overall improved, stable for DC home with continued home O2.     Discharge Exam:  T(F): 97.3 (2023 07:36), Max: 97.9 (2023 22:19)  HR: 96 (2023 10:08) (67 - 96)  BP: 114/70 (2023 10:08) (113/51 - 137/58)  RR: 18 (2023 07:36) (18 - 18)  SpO2: 93% (2023 07:36) (91% - 96%) on O2   Gen: Comfortable  HEENT: NCAT PERRL EOMI MMM clear oropharynx  Neck: Supple, no carotid bruits, no palpable lymph nodes, no thyromegaly  CVS: +S1, +S2, irregular, rate WNL  Chest: Normal resp effort at rest, lungs clear B/L  Abd: +BS, soft, non-tender, non-distended  Ext: L BKA. RLE with edema, skin hyperpigmentation  Skin: Warm, dry  Neuro: No focal deficits    Labs:    131  |  95  |  28  ---------------------< 136  4.2   |  33  |  0.96    Ca 8.4    TPro  7.3  /  Alb  3.0  /  TBili  0.4  /  DBili  0.1  /  AST  17  /  ALT  19  /  AlkPhos  80      Troponin negative   proBNP 1134    ABG : pH 7.31  /  pCO2: 79   /  pO2: 101  /  HCO3: 40   /  Base Excess: 10.2  /  SaO2: 99        Urinalysis   Color: Yellow / Appearance: Clear / S.015 / pH: x  Gluc: x / Ketone: Negative  / Bili: Negative / Urobili: Negative   Blood: x / Protein: Negative / Nitrite: Negative   Leuk Esterase: Negative / RBC: 11-25 /HPF / WBC 0-2 /HPF   Sq Epi: x / Non Sq Epi: x / Bacteria: Occasional    Micro:  Sputum culture : Normal raymundo  Urine culture : Negative  Blood culture : Negative    Imaging:  CT Chest WO 6/3: Since 2023: New bilateral lower lobe predominant bronchiolitis and consolidation, likely infectious. Unchanged left upper lobe 5 mm nodule but new compared to more remote studies. Recommend three-month follow-up.    CXR : Prior CHF is somewhat improved. Heart enlargement again noted.    XR ankle R : no fracture, dislocation, soft tissue swelling or joint effusion. Degenerative changes at the ankle joint. Small plantar and retrocalcaneal spurs. Degenerative changes of the talonavicular joint. No radiopaque foreign body.    US Duplex Venous Lower Ext Complete, Bilateral : No evidence of deep venous thrombosis in either lower extremity.    CT Abdomen and Pelvis WO : Cirrhotic liver with mild splenomegaly. No ascites. Mildly enlarged 3.1cm splenic hypodense low-attenuation lesion of unclear etiology, possibly a cyst. Consider abdominal MRI for further evaluation of these findings. No bowel distention seen.    CT Chest WO : 1. New 9 mm left upper lobe nodular opacity and nonspecific dependent groundglass may be infectious or inflammatory. Additional peripheral reticular opacities likely reflect component of chronic interstitial lung disease. Slightly enlarged 5 mm right lower lobe pulmonary nodule since , of unclear significance. Follow-up chest CT is recommended in 6 weeks to evaluate for stability versus resolution of these findings. Cardiac enlargement, multivessel coronary artery calcification, and Watchman device noted. Significantly enlarged main pulmonary artery likely reflects pulmonary arterial hypertension.    Cardiac Testing:  EKG : Afib with RVR

## 2023-06-10 RX ORDER — TIOTROPIUM BROMIDE 18 UG/1
2 CAPSULE ORAL; RESPIRATORY (INHALATION)
Qty: 1 | Refills: 0
Start: 2023-06-10

## 2023-06-10 RX ORDER — DILTIAZEM HCL 120 MG
1 CAPSULE, EXT RELEASE 24 HR ORAL
Qty: 30 | Refills: 0
Start: 2023-06-10

## 2023-06-10 RX ORDER — CHOLECALCIFEROL (VITAMIN D3) 125 MCG
1 CAPSULE ORAL
Qty: 30 | Refills: 0
Start: 2023-06-10

## 2023-06-12 ENCOUNTER — NON-APPOINTMENT (OUTPATIENT)
Age: 68
End: 2023-06-12

## 2023-06-15 ENCOUNTER — APPOINTMENT (OUTPATIENT)
Dept: FAMILY MEDICINE | Facility: CLINIC | Age: 68
End: 2023-06-15
Payer: MEDICARE

## 2023-06-15 DIAGNOSIS — J18.9 PNEUMONIA, UNSPECIFIED ORGANISM: ICD-10-CM

## 2023-06-15 PROCEDURE — 99443: CPT

## 2023-06-22 ENCOUNTER — RX RENEWAL (OUTPATIENT)
Age: 68
End: 2023-06-22

## 2023-06-23 NOTE — HISTORY OF PRESENT ILLNESS
[Home] : at home, [unfilled] , at the time of the visit. [Medical Office: ([facility])___] : at the medical office located in  [Verbal consent obtained from patient] : the patient, [unfilled] [Family Member] : family member [FreeTextEntry8] : 67yo F presents for hospital follow up. Pt was in the hospital for pneumonia and respiratory distress 2/2 to parainfluneza virus. Pt reports he was admitted to the hospital and improved over the course of his hospital stay. Pt reports he was given IV steroids and abx and discharged with PO medication that he is still taking currently. Pt is on oxygen at his living facility and reports his oxygen is set a 5L because that is what he moved it up to at home himself. Pt reports he has a nurse coming in daily from the hospital to work with him. Pt reports he was not given home PT orders from hospital because they stated he was strong enough. \par Pt is doing well today, no SOB.

## 2023-06-23 NOTE — PLAN
[FreeTextEntry1] : hospital course, imaging, labs reviewed \par Repeat Chest CT in 2 weeks \par Encourage follow up pulmonology\par Discussed importance of not changing settings on oxygen and educated on hypocapnia, pt verbalized understanding and returned oxygen to original settings\par Pt's daughter present for visit and agreed \par follow up cardiology 7/5

## 2023-06-26 ENCOUNTER — RX RENEWAL (OUTPATIENT)
Age: 68
End: 2023-06-26

## 2023-06-30 ENCOUNTER — OUTPATIENT (OUTPATIENT)
Dept: OUTPATIENT SERVICES | Facility: HOSPITAL | Age: 68
LOS: 1 days | End: 2023-06-30
Payer: COMMERCIAL

## 2023-06-30 ENCOUNTER — APPOINTMENT (OUTPATIENT)
Dept: CT IMAGING | Facility: CLINIC | Age: 68
End: 2023-06-30
Payer: MEDICARE

## 2023-06-30 DIAGNOSIS — Z98.890 OTHER SPECIFIED POSTPROCEDURAL STATES: Chronic | ICD-10-CM

## 2023-06-30 DIAGNOSIS — J18.9 PNEUMONIA, UNSPECIFIED ORGANISM: ICD-10-CM

## 2023-06-30 DIAGNOSIS — Z89.512 ACQUIRED ABSENCE OF LEFT LEG BELOW KNEE: Chronic | ICD-10-CM

## 2023-06-30 DIAGNOSIS — S88.119A COMPLETE TRAUMATIC AMPUTATION AT LEVEL BETWEEN KNEE AND ANKLE, UNSPECIFIED LOWER LEG, INITIAL ENCOUNTER: Chronic | ICD-10-CM

## 2023-06-30 DIAGNOSIS — Z93.1 GASTROSTOMY STATUS: Chronic | ICD-10-CM

## 2023-06-30 DIAGNOSIS — Z95.818 PRESENCE OF OTHER CARDIAC IMPLANTS AND GRAFTS: Chronic | ICD-10-CM

## 2023-06-30 PROCEDURE — 71250 CT THORAX DX C-: CPT

## 2023-06-30 PROCEDURE — 71250 CT THORAX DX C-: CPT | Mod: 26

## 2023-07-05 ENCOUNTER — APPOINTMENT (OUTPATIENT)
Dept: CARDIOLOGY | Facility: CLINIC | Age: 68
End: 2023-07-05
Payer: MEDICARE

## 2023-07-05 PROCEDURE — 99214 OFFICE O/P EST MOD 30 MIN: CPT | Mod: 95

## 2023-07-05 NOTE — HISTORY OF PRESENT ILLNESS
[Home] : at home, [unfilled] , at the time of the visit. [Medical Office: ([facility])___] : at the medical office located in  [Verbal consent obtained from patient] : the patient, [unfilled] [FreeTextEntry1] : Reviewed basic chem 2 weeks after increaseing lasix. Cr, K\par \par Went into hospital after May visit.  I saw him then\par admitted for hypoxic & hypercapneic respiratory failure\par multifctorial due to parainfluenza, COPD, HFpEF\par treated w/ IV lasix & steroids.\par \par BPs at home 140/70s, HRs 90s\par Not doing daily wts.\par \par meds reviewed:\par \par ASA 81 mg daily\par diltiazem 120 daily\par lasix 80 mg BID & 40 mg noon\par metoprolol 25 mg BID\par prilosec\par moustapha 25 mg daily\par \par \par No LE edema.\par \par A/P\par \par -Cont. current meds\par -Record daily wts & BPs\par \par Return 2 months in person visit.\par \par \par \par

## 2023-07-08 ENCOUNTER — INPATIENT (INPATIENT)
Facility: HOSPITAL | Age: 68
LOS: 5 days | Discharge: HOME | DRG: 291 | End: 2023-07-14
Attending: HOSPITALIST | Admitting: STUDENT IN AN ORGANIZED HEALTH CARE EDUCATION/TRAINING PROGRAM
Payer: MEDICARE

## 2023-07-08 VITALS
RESPIRATION RATE: 16 BRPM | DIASTOLIC BLOOD PRESSURE: 93 MMHG | SYSTOLIC BLOOD PRESSURE: 128 MMHG | OXYGEN SATURATION: 94 % | HEART RATE: 58 BPM | WEIGHT: 294.1 LBS | TEMPERATURE: 98 F

## 2023-07-08 DIAGNOSIS — Z93.1 GASTROSTOMY STATUS: Chronic | ICD-10-CM

## 2023-07-08 DIAGNOSIS — Z98.890 OTHER SPECIFIED POSTPROCEDURAL STATES: Chronic | ICD-10-CM

## 2023-07-08 DIAGNOSIS — Z95.818 PRESENCE OF OTHER CARDIAC IMPLANTS AND GRAFTS: Chronic | ICD-10-CM

## 2023-07-08 DIAGNOSIS — I50.9 HEART FAILURE, UNSPECIFIED: ICD-10-CM

## 2023-07-08 DIAGNOSIS — Z89.512 ACQUIRED ABSENCE OF LEFT LEG BELOW KNEE: Chronic | ICD-10-CM

## 2023-07-08 DIAGNOSIS — S88.119A COMPLETE TRAUMATIC AMPUTATION AT LEVEL BETWEEN KNEE AND ANKLE, UNSPECIFIED LOWER LEG, INITIAL ENCOUNTER: Chronic | ICD-10-CM

## 2023-07-08 LAB
ADD ON TEST-SPECIMEN IN LAB: SIGNIFICANT CHANGE UP
ALBUMIN SERPL ELPH-MCNC: 2.8 G/DL — LOW (ref 3.3–5)
ALP SERPL-CCNC: 115 U/L — SIGNIFICANT CHANGE UP (ref 40–120)
ALT FLD-CCNC: 13 U/L — SIGNIFICANT CHANGE UP (ref 12–78)
ANION GAP SERPL CALC-SCNC: 4 MMOL/L — LOW (ref 5–17)
AST SERPL-CCNC: 12 U/L — LOW (ref 15–37)
BASE EXCESS BLDV CALC-SCNC: 8.2 MMOL/L — HIGH (ref -2–3)
BASOPHILS # BLD AUTO: 0.04 K/UL — SIGNIFICANT CHANGE UP (ref 0–0.2)
BASOPHILS NFR BLD AUTO: 0.4 % — SIGNIFICANT CHANGE UP (ref 0–2)
BILIRUB SERPL-MCNC: 0.5 MG/DL — SIGNIFICANT CHANGE UP (ref 0.2–1.2)
BUN SERPL-MCNC: 7 MG/DL — SIGNIFICANT CHANGE UP (ref 7–23)
CALCIUM SERPL-MCNC: 8.9 MG/DL — SIGNIFICANT CHANGE UP (ref 8.5–10.1)
CHLORIDE SERPL-SCNC: 101 MMOL/L — SIGNIFICANT CHANGE UP (ref 96–108)
CO2 SERPL-SCNC: 33 MMOL/L — HIGH (ref 22–31)
CREAT SERPL-MCNC: 0.92 MG/DL — SIGNIFICANT CHANGE UP (ref 0.5–1.3)
EGFR: 92 ML/MIN/1.73M2 — SIGNIFICANT CHANGE UP
EOSINOPHIL # BLD AUTO: 0.04 K/UL — SIGNIFICANT CHANGE UP (ref 0–0.5)
EOSINOPHIL NFR BLD AUTO: 0.4 % — SIGNIFICANT CHANGE UP (ref 0–6)
FLUAV AG NPH QL: SIGNIFICANT CHANGE UP
FLUBV AG NPH QL: SIGNIFICANT CHANGE UP
GLUCOSE SERPL-MCNC: 117 MG/DL — HIGH (ref 70–99)
HCO3 BLDV-SCNC: 36 MMOL/L — HIGH (ref 22–29)
HCT VFR BLD CALC: 35.7 % — LOW (ref 39–50)
HGB BLD-MCNC: 11.8 G/DL — LOW (ref 13–17)
IMM GRANULOCYTES NFR BLD AUTO: 1.5 % — HIGH (ref 0–0.9)
LACTATE SERPL-SCNC: 2.2 MMOL/L — HIGH (ref 0.7–2)
LACTATE SERPL-SCNC: 2.2 MMOL/L — HIGH (ref 0.7–2)
LYMPHOCYTES # BLD AUTO: 0.84 K/UL — LOW (ref 1–3.3)
LYMPHOCYTES # BLD AUTO: 8 % — LOW (ref 13–44)
MCHC RBC-ENTMCNC: 31.2 PG — SIGNIFICANT CHANGE UP (ref 27–34)
MCHC RBC-ENTMCNC: 33.1 GM/DL — SIGNIFICANT CHANGE UP (ref 32–36)
MCV RBC AUTO: 94.4 FL — SIGNIFICANT CHANGE UP (ref 80–100)
MONOCYTES # BLD AUTO: 0.59 K/UL — SIGNIFICANT CHANGE UP (ref 0–0.9)
MONOCYTES NFR BLD AUTO: 5.6 % — SIGNIFICANT CHANGE UP (ref 2–14)
NEUTROPHILS # BLD AUTO: 8.79 K/UL — HIGH (ref 1.8–7.4)
NEUTROPHILS NFR BLD AUTO: 84.1 % — HIGH (ref 43–77)
NT-PROBNP SERPL-SCNC: 898 PG/ML — HIGH (ref 0–125)
PCO2 BLDV: 60 MMHG — HIGH (ref 42–55)
PH BLDV: 7.38 — SIGNIFICANT CHANGE UP (ref 7.32–7.43)
PLATELET # BLD AUTO: 201 K/UL — SIGNIFICANT CHANGE UP (ref 150–400)
PO2 BLDV: 40 MMHG — SIGNIFICANT CHANGE UP (ref 25–45)
POTASSIUM SERPL-MCNC: 3.4 MMOL/L — LOW (ref 3.5–5.3)
POTASSIUM SERPL-SCNC: 3.4 MMOL/L — LOW (ref 3.5–5.3)
PROT SERPL-MCNC: 7.3 GM/DL — SIGNIFICANT CHANGE UP (ref 6–8.3)
RBC # BLD: 3.78 M/UL — LOW (ref 4.2–5.8)
RBC # FLD: 15 % — HIGH (ref 10.3–14.5)
RSV RNA NPH QL NAA+NON-PROBE: SIGNIFICANT CHANGE UP
SAO2 % BLDV: 62 % — LOW (ref 67–88)
SARS-COV-2 RNA SPEC QL NAA+PROBE: SIGNIFICANT CHANGE UP
SODIUM SERPL-SCNC: 138 MMOL/L — SIGNIFICANT CHANGE UP (ref 135–145)
TROPONIN I, HIGH SENSITIVITY RESULT: 7.54 NG/L — SIGNIFICANT CHANGE UP
WBC # BLD: 10.46 K/UL — SIGNIFICANT CHANGE UP (ref 3.8–10.5)
WBC # FLD AUTO: 10.46 K/UL — SIGNIFICANT CHANGE UP (ref 3.8–10.5)

## 2023-07-08 PROCEDURE — 83735 ASSAY OF MAGNESIUM: CPT

## 2023-07-08 PROCEDURE — 84100 ASSAY OF PHOSPHORUS: CPT

## 2023-07-08 PROCEDURE — 76700 US EXAM ABDOM COMPLETE: CPT

## 2023-07-08 PROCEDURE — 94640 AIRWAY INHALATION TREATMENT: CPT

## 2023-07-08 PROCEDURE — 97163 PT EVAL HIGH COMPLEX 45 MIN: CPT | Mod: GP

## 2023-07-08 PROCEDURE — 78800 RP LOCLZJ TUM 1 AREA 1 D IMG: CPT

## 2023-07-08 PROCEDURE — 80048 BASIC METABOLIC PNL TOTAL CA: CPT

## 2023-07-08 PROCEDURE — 85027 COMPLETE CBC AUTOMATED: CPT

## 2023-07-08 PROCEDURE — 80053 COMPREHEN METABOLIC PANEL: CPT

## 2023-07-08 PROCEDURE — 86334 IMMUNOFIX E-PHORESIS SERUM: CPT

## 2023-07-08 PROCEDURE — 71045 X-RAY EXAM CHEST 1 VIEW: CPT

## 2023-07-08 PROCEDURE — 82550 ASSAY OF CK (CPK): CPT

## 2023-07-08 PROCEDURE — 84484 ASSAY OF TROPONIN QUANT: CPT

## 2023-07-08 PROCEDURE — 82140 ASSAY OF AMMONIA: CPT

## 2023-07-08 PROCEDURE — 97116 GAIT TRAINING THERAPY: CPT | Mod: GP

## 2023-07-08 PROCEDURE — 85730 THROMBOPLASTIN TIME PARTIAL: CPT

## 2023-07-08 PROCEDURE — A9538: CPT

## 2023-07-08 PROCEDURE — 84443 ASSAY THYROID STIM HORMONE: CPT

## 2023-07-08 PROCEDURE — 82962 GLUCOSE BLOOD TEST: CPT

## 2023-07-08 PROCEDURE — 36415 COLL VENOUS BLD VENIPUNCTURE: CPT

## 2023-07-08 PROCEDURE — 83521 IG LIGHT CHAINS FREE EACH: CPT

## 2023-07-08 PROCEDURE — 99223 1ST HOSP IP/OBS HIGH 75: CPT

## 2023-07-08 PROCEDURE — 85025 COMPLETE CBC W/AUTO DIFF WBC: CPT

## 2023-07-08 PROCEDURE — 82803 BLOOD GASES ANY COMBINATION: CPT

## 2023-07-08 PROCEDURE — 87635 SARS-COV-2 COVID-19 AMP PRB: CPT

## 2023-07-08 PROCEDURE — 71045 X-RAY EXAM CHEST 1 VIEW: CPT | Mod: 26

## 2023-07-08 PROCEDURE — 80061 LIPID PANEL: CPT

## 2023-07-08 PROCEDURE — 93926 LOWER EXTREMITY STUDY: CPT | Mod: RT

## 2023-07-08 PROCEDURE — 93010 ELECTROCARDIOGRAM REPORT: CPT

## 2023-07-08 PROCEDURE — 99285 EMERGENCY DEPT VISIT HI MDM: CPT

## 2023-07-08 PROCEDURE — 85610 PROTHROMBIN TIME: CPT

## 2023-07-08 PROCEDURE — 83036 HEMOGLOBIN GLYCOSYLATED A1C: CPT

## 2023-07-08 PROCEDURE — 36600 WITHDRAWAL OF ARTERIAL BLOOD: CPT

## 2023-07-08 PROCEDURE — 83880 ASSAY OF NATRIURETIC PEPTIDE: CPT

## 2023-07-08 PROCEDURE — 94660 CPAP INITIATION&MGMT: CPT

## 2023-07-08 RX ORDER — THIAMINE MONONITRATE (VIT B1) 100 MG
100 TABLET ORAL DAILY
Refills: 0 | Status: COMPLETED | OUTPATIENT
Start: 2023-07-08 | End: 2023-07-11

## 2023-07-08 RX ORDER — DOXAZOSIN MESYLATE 4 MG
1 TABLET ORAL AT BEDTIME
Refills: 0 | Status: DISCONTINUED | OUTPATIENT
Start: 2023-07-08 | End: 2023-07-14

## 2023-07-08 RX ORDER — POTASSIUM CHLORIDE 20 MEQ
40 PACKET (EA) ORAL ONCE
Refills: 0 | Status: COMPLETED | OUTPATIENT
Start: 2023-07-08 | End: 2023-07-08

## 2023-07-08 RX ORDER — SPIRONOLACTONE 25 MG/1
25 TABLET, FILM COATED ORAL DAILY
Refills: 0 | Status: DISCONTINUED | OUTPATIENT
Start: 2023-07-08 | End: 2023-07-10

## 2023-07-08 RX ORDER — HEPARIN SODIUM 5000 [USP'U]/ML
5000 INJECTION INTRAVENOUS; SUBCUTANEOUS EVERY 8 HOURS
Refills: 0 | Status: DISCONTINUED | OUTPATIENT
Start: 2023-07-08 | End: 2023-07-11

## 2023-07-08 RX ORDER — IPRATROPIUM/ALBUTEROL SULFATE 18-103MCG
3 AEROSOL WITH ADAPTER (GRAM) INHALATION
Refills: 0 | Status: COMPLETED | OUTPATIENT
Start: 2023-07-08 | End: 2023-07-08

## 2023-07-08 RX ORDER — METOPROLOL TARTRATE 50 MG
25 TABLET ORAL
Refills: 0 | Status: DISCONTINUED | OUTPATIENT
Start: 2023-07-08 | End: 2023-07-14

## 2023-07-08 RX ORDER — FOLIC ACID 0.8 MG
1 TABLET ORAL DAILY
Refills: 0 | Status: DISCONTINUED | OUTPATIENT
Start: 2023-07-08 | End: 2023-07-14

## 2023-07-08 RX ORDER — QUETIAPINE FUMARATE 200 MG/1
100 TABLET, FILM COATED ORAL AT BEDTIME
Refills: 0 | Status: DISCONTINUED | OUTPATIENT
Start: 2023-07-08 | End: 2023-07-14

## 2023-07-08 RX ORDER — THIAMINE MONONITRATE (VIT B1) 100 MG
100 TABLET ORAL DAILY
Refills: 0 | Status: DISCONTINUED | OUTPATIENT
Start: 2023-07-08 | End: 2023-07-08

## 2023-07-08 RX ORDER — PANTOPRAZOLE SODIUM 20 MG/1
40 TABLET, DELAYED RELEASE ORAL
Refills: 0 | Status: DISCONTINUED | OUTPATIENT
Start: 2023-07-08 | End: 2023-07-14

## 2023-07-08 RX ORDER — GABAPENTIN 400 MG/1
400 CAPSULE ORAL THREE TIMES A DAY
Refills: 0 | Status: DISCONTINUED | OUTPATIENT
Start: 2023-07-08 | End: 2023-07-14

## 2023-07-08 RX ORDER — PSYLLIUM SEED (WITH DEXTROSE)
2 POWDER (GRAM) ORAL
Refills: 0 | DISCHARGE

## 2023-07-08 RX ORDER — ACETAMINOPHEN 500 MG
650 TABLET ORAL EVERY 6 HOURS
Refills: 0 | Status: DISCONTINUED | OUTPATIENT
Start: 2023-07-08 | End: 2023-07-14

## 2023-07-08 RX ORDER — BUDESONIDE AND FORMOTEROL FUMARATE DIHYDRATE 160; 4.5 UG/1; UG/1
2 AEROSOL RESPIRATORY (INHALATION)
Refills: 0 | Status: DISCONTINUED | OUTPATIENT
Start: 2023-07-08 | End: 2023-07-14

## 2023-07-08 RX ORDER — ALBUTEROL 90 UG/1
2 AEROSOL, METERED ORAL EVERY 6 HOURS
Refills: 0 | Status: DISCONTINUED | OUTPATIENT
Start: 2023-07-08 | End: 2023-07-14

## 2023-07-08 RX ORDER — FUROSEMIDE 40 MG
40 TABLET ORAL
Refills: 0 | Status: DISCONTINUED | OUTPATIENT
Start: 2023-07-08 | End: 2023-07-10

## 2023-07-08 RX ORDER — PREGABALIN 225 MG/1
1000 CAPSULE ORAL DAILY
Refills: 0 | Status: DISCONTINUED | OUTPATIENT
Start: 2023-07-08 | End: 2023-07-14

## 2023-07-08 RX ORDER — ASPIRIN/CALCIUM CARB/MAGNESIUM 324 MG
81 TABLET ORAL DAILY
Refills: 0 | Status: DISCONTINUED | OUTPATIENT
Start: 2023-07-08 | End: 2023-07-14

## 2023-07-08 RX ORDER — PREGABALIN 225 MG/1
1 CAPSULE ORAL
Refills: 0 | DISCHARGE

## 2023-07-08 RX ORDER — DILTIAZEM HCL 120 MG
180 CAPSULE, EXT RELEASE 24 HR ORAL DAILY
Refills: 0 | Status: DISCONTINUED | OUTPATIENT
Start: 2023-07-08 | End: 2023-07-14

## 2023-07-08 RX ORDER — FUROSEMIDE 40 MG
40 TABLET ORAL ONCE
Refills: 0 | Status: COMPLETED | OUTPATIENT
Start: 2023-07-08 | End: 2023-07-08

## 2023-07-08 RX ORDER — DOCUSATE SODIUM 100 MG
2 CAPSULE ORAL
Refills: 0 | DISCHARGE

## 2023-07-08 RX ADMIN — GABAPENTIN 400 MILLIGRAM(S): 400 CAPSULE ORAL at 21:48

## 2023-07-08 RX ADMIN — BUDESONIDE AND FORMOTEROL FUMARATE DIHYDRATE 2 PUFF(S): 160; 4.5 AEROSOL RESPIRATORY (INHALATION) at 21:00

## 2023-07-08 RX ADMIN — QUETIAPINE FUMARATE 100 MILLIGRAM(S): 200 TABLET, FILM COATED ORAL at 21:46

## 2023-07-08 RX ADMIN — Medication 125 MILLIGRAM(S): at 10:04

## 2023-07-08 RX ADMIN — Medication 25 MILLIGRAM(S): at 21:46

## 2023-07-08 RX ADMIN — Medication 2 MILLIGRAM(S): at 21:45

## 2023-07-08 RX ADMIN — Medication 650 MILLIGRAM(S): at 21:48

## 2023-07-08 RX ADMIN — Medication 650 MILLIGRAM(S): at 22:45

## 2023-07-08 RX ADMIN — Medication 10 MILLIGRAM(S): at 21:48

## 2023-07-08 RX ADMIN — Medication 3 MILLILITER(S): at 10:10

## 2023-07-08 RX ADMIN — HEPARIN SODIUM 5000 UNIT(S): 5000 INJECTION INTRAVENOUS; SUBCUTANEOUS at 21:49

## 2023-07-08 RX ADMIN — Medication 3 MILLILITER(S): at 10:24

## 2023-07-08 RX ADMIN — Medication 2 MILLIGRAM(S): at 18:51

## 2023-07-08 RX ADMIN — Medication 40 MILLIEQUIVALENT(S): at 15:33

## 2023-07-08 RX ADMIN — Medication 1 MILLIGRAM(S): at 21:48

## 2023-07-08 RX ADMIN — Medication 40 MILLIGRAM(S): at 10:04

## 2023-07-08 RX ADMIN — Medication 3 MILLILITER(S): at 10:06

## 2023-07-08 NOTE — ED PROVIDER NOTE - NS ED ROS FT
Gen: No fever, normal appetite  Eyes: No eye irritation or discharge  ENT: No ear pain, congestion, sore throat  Resp: No cough +trouble breathing  Cardiovascular: No chest pain or palpitation  Gastroenteric: No nausea/vomiting, diarrhea, constipation  :  No change in urine output; no dysuria  MS: No joint or muscle pain  Skin: No rashes  Neuro: No headache; no abnormal movements  Remainder negative, except as per the HPI

## 2023-07-08 NOTE — ED PROVIDER NOTE - PHYSICAL EXAMINATION
GEN - NAD; well appearing; A+O x3   HEAD - NC/AT     EYES - EOMI, no conjunctival pallor, no scleral icterus  ENT -   mucous membranes  moist , no discharge      NECK - Neck supple  PULM - Diffuse wheezing, symmetric breath sounds  COR -  RRR, S1 S2, no murmurs  ABD - , ND, NT, soft, no guarding, no rebound, no masses    BACK - no CVA tenderness, nontender spine     EXTREMS - no edema, no deformity, warm and well perfused    SKIN - no rash or bruising      NEUROLOGIC - alert, sensation nl, motor 5/5 RUE/LUE/RLE/LLE

## 2023-07-08 NOTE — H&P ADULT - REASON FOR ADMISSION
continued bleeding/Concern for neurologic deterioration/Concern for renal deterioration/Other:
chf exacerbation with etoh withdrawal

## 2023-07-08 NOTE — ED ADULT NURSE NOTE - OBJECTIVE STATEMENT
pt presents to ED BIBA from [facility] due to SOB hx of COPD pt given Duoneb x 2 by EMS. pt is alert and oriented. md reyez at bedside

## 2023-07-08 NOTE — PATIENT PROFILE ADULT - FALL HARM RISK - HARM RISK INTERVENTIONS

## 2023-07-08 NOTE — PATIENT PROFILE ADULT - FUNCTIONAL ASSESSMENT - BASIC MOBILITY ASSESSMENT TYPE
Roomed by Rosie Clifford MA  LCV : New Patient    SUBJECTIVE:  Current Outpatient Medications   Medication Sig Dispense Refill   • metFORMIN (GLUCOPHAGE) 500 MG tablet Take 1 tablet by mouth 2 times daily (with meals). 60 tablet 0   • ALPRAZolam (XANAX) 0.25 MG tablet Take 1 tablet by mouth every night at bedtime as needed for sleep. 30 tablet 0   • chlorthalidone (THALITONE) 25 MG tablet Take 1 tablet by mouth daily. 90 tablet 0   • levothyroxine (SYNTHROID, LEVOTHROID) 50 MCG tablet Take 1 tablet by mouth daily. 90 tablet 0   • predniSONE (DELTASONE) 20 MG tablet 2 tablets once a day 30 tablet 0   • cyclobenzaprine (FLEXERIL) 10 MG tablet Take 1 tablet by mouth at bedtime as needed for Muscle spasms. 15 tablet 0     No current facility-administered medications for this visit.      There is no problem list on file for this patient.    Social History     Socioeconomic History   • Marital status: /Civil Union     Spouse name: Not on file   • Number of children: Not on file   • Years of education: Not on file   • Highest education level: Not on file   Social Needs   • Financial resource strain: Not on file   • Food insecurity - worry: Not on file   • Food insecurity - inability: Not on file   • Transportation needs - medical: Not on file   • Transportation needs - non-medical: Not on file   Occupational History   • Not on file   Tobacco Use   • Smoking status: Not on file   Substance and Sexual Activity   • Alcohol use: Not on file   • Drug use: Not on file   • Sexual activity: Not on file   Other Topics Concern   • Not on file   Social History Narrative   • Not on file     No family history on file.    ROS:  GENERAL:no fever, uri   SKIN: No other skin complaints today.  No new or changing moles  No personal history of skin cancer  skin type 2       ALLERGIES: not on file.       Waleska Noel is an 56 year old female who presents for evaluation of a full body skin exam .   Problem # 1: LESION -trunk   SUBJECTIVE:  It has been present for a few years and has been gradually worsening.  Previous treatments: none.  Risk factors:none .  Symptoms: new lesions  OBJECTIVE: face,trunk ,lower extremities - seborrheic keratosis evenly pigmented, symmetric, sharply marginated   ASSESSMENT:seb ker's -- New problem    PLAN:   no treatment needed   Regular exams  Sun protection discussed with patient.         Negative Findings: Exam of head,lips, neck, chest, back, abdomen, and both upper extremities and lower extremities reveal no suspicious lesions.   inspection of the lids and conjunctiva:normal  well developed, well nourished  alert and oriented x3       Lenny Plunkett MD  follow up 1yr or prn        Admission

## 2023-07-08 NOTE — ED PROVIDER NOTE - CLINICAL SUMMARY MEDICAL DECISION MAKING FREE TEXT BOX
Patient with history of COPD and CHF presenting with shortness of breath found to be sating high 80s on 6 L nasal cannula will check chest x-ray  will give nebs and steroids will trial Lasix plan for admission.

## 2023-07-08 NOTE — ED ADULT NURSE NOTE - NSFALLRISKINTERV_ED_ALL_ED

## 2023-07-08 NOTE — ED ADULT NURSE NOTE - CAS EDP DISCH DISPOSITION ADMI
9/11 - Consult Cardiology for transitioning off Sotalol given multiple interactions with abx/chemo/venetoclax  Holding AC in setting low plts  9/13 Appreciate Cardiology consulted, forseeing multiple interactions with Sotalol, d/c'd, starting Toprol xl 25 qd Telemetry

## 2023-07-08 NOTE — ED PROVIDER NOTE - OBJECTIVE STATEMENT
67 y/o male w/ a PMHx of COPD on 3L home O2, LLE BKA, EtOH abuse, anemia, Afib, CHF, HTN, and GERD presents to the ED c/o difficulty breathing. Pt reports since this morning he has been SOB, on chronic 3L home O2 but that hasn't been enough, also endorses using Nebs w/o relief. Denies LE swelling, fever, cough, or any other Sx. Pt states his normal O2 sat is around 93%. No other complaints at this time. Former smoker.

## 2023-07-08 NOTE — H&P ADULT - ASSESSMENT
69 y/o male with PMH of HFpEF, paroxysmal atrial fibrillation s/p Watchman, mod MR, COPD on 4 L of home O2,  ETOH induced cirrhosis, Hx of DT s/p trach now decannulated, HTN, obesity, chronic resp failure on home O2 4L, VIJAY (noncompliant w/ CPAP), alcoholism, severe pulmonary HTN, Left BKA, PAD, presents to  with 1 day history of difficulty breathing, productive cough, wheezing. Recent admission for COPD exacerbation with CHF, and has been having sob at home the past 1 day. He is in assisted living and drinks daily. He misses medications at times, and reports he "may have" missed lasix, which he was on 60mg TID up until recently. He has SOB with orthopnea. He also had significant improvement with IV lasix in ED. At time of evaluation he is pleasant and has a slight tremor with tongue fasciculations which his family says he does not do. ROS neg for CP, n/v/d ha vision changes, or foot pain.    #Acute on chronic hypoxic respiratory failure 2/2 CHF 2/2 noncompliance and etoh  COPD, hx of recent pneumonia, and now with acute HFpEF exacerbation  VIJAY non-adherent to NIPPV, pulmonary HTN  Continue to treat underlying issues  O2 supplementation (on continuous home O2)  ECHO  NIPPV with BIPAP at night  Start lasix 40mg BID, strict I/O, continue metoprolol, increase diltiazem back to 180mg CD, cont spironolactone 25mg daily and will recommend 25mg BID upon discharge for cirrhosis.  Also will recommend torsemide 60mg on discharge once daily for better central abdominal diuresis and slower onset of action to aid with compliance     ETOH withdrawal   CIWA 2, last drink last night  Ativan protocol  thiamin, folate    Cirrhosis   Check INR/evaluate Child-Bowles and MELD scores  [facility] for hepatocellular screening and eval of ascites   Spironolactone and diuretic as above    Chronic afib (prev paroxysmal)  Cont Diltiazem and metoprolol  Not on AC with cirrhosis and bleeding  S/P Watchman    VTE ppx   hep sub q q8

## 2023-07-08 NOTE — H&P ADULT - HISTORY OF PRESENT ILLNESS
69 y/o male with PMH of HFpEF, paroxysmal atrial fibrillation s/p Watchman, mod MR, COPD on 4 L of home O2,  ETOH induced cirrhosis, Hx of DT s/p trach now decannulated, HTN, obesity, chronic resp failure on home O2 4L, VIJAY (noncompliant w/ CPAP), alcoholism, severe pulmonary HTN, Left BKA, PAD, presents to  with 1 day history of difficulty breathing, productive cough, wheezing. Recent admission for COPD exacerbation with CHF, and has been having sob at home the past 1 day. He is in assisted living and drinks daily. He misses medications at times, and reports he "may have" missed lasix, which he was on 60mg TID up until recently. He has SOB with orthopnea. He also had significant improvement with IV lasix in ED. At time of evaluation he is pleasant and has a slight tremor with tongue fasciculations which his family says he does not do. ROS neg for CP, n/v/d ha vision changes, or foot pain.    EKG afib rate 101 with normal axis intervals     Chronic atrial fibrillation    Alcohol abuse    Poor historian    CHF (congestive heart failure)    Cirrhosis    Emphysema of lung    Alcohol withdrawal    Collapsed lung    Meningitis    Falls    Anemia    Chronic obstructive pulmonary disease (COPD)    GERD (gastroesophageal reflux disease)    Hypertension    Presence of Watchman left atrial appendage closure device    Atrial fibrillation    COPD without exacerbation    Chronic CHF    S/P BKA (below knee amputation) unilateral, left    Presence of Watchman left atrial appendage closure device    Unilateral amputation of lower extremity below knee    H/O tracheostomy    S/P percutaneous endoscopic gastrostomy (PEG) tube placement      	  Vitals:  T(C): 36.7 (07-08-23 @ 15:20), Max: 36.9 (07-08-23 @ 09:30)  HR: 100 (07-08-23 @ 15:20) (58 - 100)  BP: 109/55 (07-08-23 @ 15:20) (109/55 - 128/93)  RR: 20 (07-08-23 @ 15:20) (13 - 20)  SpO2: 96% (07-08-23 @ 15:20) (94% - 98%)  Wt(kg): --  I&O's Summary    Weight (kg): 133.4 (07-08 @ 09:30)    PHYSICAL EXAM:  Appearance: Comfortable. No acute distress  HEENT:  Head and neck: Atraumatic. Normocephalic.  Normal oral mucosa, PERRL, Neck is supple. No JVD, No carotid bruit.   Neurologic: A & O x 3, no focal deficits. EOMI , Cranial nerves are intact.  Lymphatic: No cervical lymphadenopathy  Cardiovascular: Normal S1 S2, irregularly irregular rhythm, No murmur, rubs/gallops. + JVD, trace edema with L BKA  Respiratory: Lungs with b/l rhonchi and bibasilar crackles  Gastrointestinal:  Soft, Non-tender, + BS, protuberant.  Psychiatry: Patient is calm. No agitation. Mood & affect appropriate  Skin: No rashes/ ecchymoses/cyanosis/ulcers visualized on the face, hands or feet however multiple abrasions and healing ulcers on R lower leg.    CURRENT MEDICATIONS:  diltiazem    milliGRAM(s) Oral daily  doxazosin 1 milliGRAM(s) Oral at bedtime  furosemide   Injectable 40 milliGRAM(s) IV Push two times a day  metoprolol tartrate 25 milliGRAM(s) Oral two times a day  spironolactone 25 milliGRAM(s) Oral daily    budesonide 160 MICROgram(s)/formoterol 4.5 MICROgram(s) Inhaler  cyanocobalamin 1000 Oral  folic acid 1 Oral  multivitamin 1 Oral  thiamine 100 Oral  busPIRone  gabapentin  LORazepam     Tablet  LORazepam     Tablet  QUEtiapine  pantoprazole    Tablet  aspirin enteric coated  cyanocobalamin  folic acid  multivitamin  thiamine      LABS:	 	                              11.8   10.46 )-----------( 201      ( 08 Jul 2023 09:51 )             35.7     07-08    138  |  101  |  7   ----------------------------<  117<H>  3.4<L>   |  33<H>  |  0.92    Ca    8.9      08 Jul 2023 09:51    TPro  7.3  /  Alb  2.8<L>  /  TBili  0.5  /  DBili  x   /  AST  12<L>  /  ALT  13  /  AlkPhos  115  07-08< from: TTE Echo Complete w/o Contrast w/ Doppler (06.30.21 @ 14:49) >   Mild concentric left ventricular hypertrophy is present.   Estimated left ventricular ejection fraction is 60-65 %.   The left atrium is severely dilated.   The right atrium is not well seen.   The right ventricle is not well seen, appears grossly normal.   The aortic valve is trileafletwith thin pliable leaflets.   Fibrocalcific changes noted to the mitral valve leaflets with preserved   leaflet excursion.   Highly eccentric mitral regurgitation is noted.(? severity-study limited))   The tricuspid valve leaflets are thin and pliable; valve motion is normal.   Moderate (2+) tricuspid valve regurgitation is present.   Moderate pulmonary hypertension.   No evidence of pericardial effusion.    < end of copied text >  < from: Xray Chest 1 View- PORTABLE-Urgent (07.08.23 @ 11:51) >  IMPRESSION: Cardiomegaly. Increased pulmonary vascular congestion noted.    < end of copied text >  < from: CT Chest No Cont (06.03.23 @ 17:01) >  IMPRESSION:    Since 5/27/2023:    New bilateral lower lobe predominant bronchiolitis and consolidation,   likely infectious.    Unchanged left upper lobe 5 mm nodule but new compared to more remote   studies. Recommend three-month follow-up.

## 2023-07-08 NOTE — ED ADULT NURSE NOTE - PHONE #
Discussed steroid cream and moisturizer  Socks w/ shoes  reviewed possible new behaviors, new soaps, lotions and detergents  Mom to call monday  
none
[phone]

## 2023-07-09 DIAGNOSIS — I48.21 PERMANENT ATRIAL FIBRILLATION: ICD-10-CM

## 2023-07-09 DIAGNOSIS — F10.10 ALCOHOL ABUSE, UNCOMPLICATED: ICD-10-CM

## 2023-07-09 DIAGNOSIS — J90 PLEURAL EFFUSION, NOT ELSEWHERE CLASSIFIED: ICD-10-CM

## 2023-07-09 DIAGNOSIS — J96.01 ACUTE RESPIRATORY FAILURE WITH HYPOXIA: ICD-10-CM

## 2023-07-09 DIAGNOSIS — I50.33 ACUTE ON CHRONIC DIASTOLIC (CONGESTIVE) HEART FAILURE: ICD-10-CM

## 2023-07-09 DIAGNOSIS — I48.20 CHRONIC ATRIAL FIBRILLATION, UNSPECIFIED: ICD-10-CM

## 2023-07-09 DIAGNOSIS — J44.9 CHRONIC OBSTRUCTIVE PULMONARY DISEASE, UNSPECIFIED: ICD-10-CM

## 2023-07-09 LAB
A1C WITH ESTIMATED AVERAGE GLUCOSE RESULT: 5.9 % — HIGH (ref 4–5.6)
ALBUMIN SERPL ELPH-MCNC: 2.7 G/DL — LOW (ref 3.3–5)
ALP SERPL-CCNC: 93 U/L — SIGNIFICANT CHANGE UP (ref 40–120)
ALT FLD-CCNC: 13 U/L — SIGNIFICANT CHANGE UP (ref 12–78)
ANION GAP SERPL CALC-SCNC: 6 MMOL/L — SIGNIFICANT CHANGE UP (ref 5–17)
AST SERPL-CCNC: 8 U/L — LOW (ref 15–37)
BASOPHILS # BLD AUTO: 0.01 K/UL — SIGNIFICANT CHANGE UP (ref 0–0.2)
BASOPHILS NFR BLD AUTO: 0.1 % — SIGNIFICANT CHANGE UP (ref 0–2)
BILIRUB SERPL-MCNC: 0.4 MG/DL — SIGNIFICANT CHANGE UP (ref 0.2–1.2)
BUN SERPL-MCNC: 14 MG/DL — SIGNIFICANT CHANGE UP (ref 7–23)
CALCIUM SERPL-MCNC: 9 MG/DL — SIGNIFICANT CHANGE UP (ref 8.5–10.1)
CHLORIDE SERPL-SCNC: 99 MMOL/L — SIGNIFICANT CHANGE UP (ref 96–108)
CHOLEST SERPL-MCNC: 189 MG/DL — SIGNIFICANT CHANGE UP
CK SERPL-CCNC: 24 U/L — LOW (ref 26–308)
CO2 SERPL-SCNC: 28 MMOL/L — SIGNIFICANT CHANGE UP (ref 22–31)
CREAT SERPL-MCNC: 0.88 MG/DL — SIGNIFICANT CHANGE UP (ref 0.5–1.3)
EGFR: 95 ML/MIN/1.73M2 — SIGNIFICANT CHANGE UP
EOSINOPHIL # BLD AUTO: 0 K/UL — SIGNIFICANT CHANGE UP (ref 0–0.5)
EOSINOPHIL NFR BLD AUTO: 0 % — SIGNIFICANT CHANGE UP (ref 0–6)
ESTIMATED AVERAGE GLUCOSE: 123 MG/DL — HIGH (ref 68–114)
GLUCOSE SERPL-MCNC: 169 MG/DL — HIGH (ref 70–99)
HCT VFR BLD CALC: 34 % — LOW (ref 39–50)
HDLC SERPL-MCNC: 49 MG/DL — SIGNIFICANT CHANGE UP
HGB BLD-MCNC: 11.2 G/DL — LOW (ref 13–17)
IMM GRANULOCYTES NFR BLD AUTO: 1.8 % — HIGH (ref 0–0.9)
LIPID PNL WITH DIRECT LDL SERPL: 127 MG/DL — HIGH
LYMPHOCYTES # BLD AUTO: 0.68 K/UL — LOW (ref 1–3.3)
LYMPHOCYTES # BLD AUTO: 4.9 % — LOW (ref 13–44)
MAGNESIUM SERPL-MCNC: 1.8 MG/DL — SIGNIFICANT CHANGE UP (ref 1.6–2.6)
MCHC RBC-ENTMCNC: 30.9 PG — SIGNIFICANT CHANGE UP (ref 27–34)
MCHC RBC-ENTMCNC: 32.9 GM/DL — SIGNIFICANT CHANGE UP (ref 32–36)
MCV RBC AUTO: 93.9 FL — SIGNIFICANT CHANGE UP (ref 80–100)
MONOCYTES # BLD AUTO: 0.42 K/UL — SIGNIFICANT CHANGE UP (ref 0–0.9)
MONOCYTES NFR BLD AUTO: 3 % — SIGNIFICANT CHANGE UP (ref 2–14)
NEUTROPHILS # BLD AUTO: 12.48 K/UL — HIGH (ref 1.8–7.4)
NEUTROPHILS NFR BLD AUTO: 90.2 % — HIGH (ref 43–77)
NON HDL CHOLESTEROL: 140 MG/DL — HIGH
NT-PROBNP SERPL-SCNC: 1755 PG/ML — HIGH (ref 0–125)
PHOSPHATE SERPL-MCNC: 3 MG/DL — SIGNIFICANT CHANGE UP (ref 2.5–4.5)
PLATELET # BLD AUTO: 230 K/UL — SIGNIFICANT CHANGE UP (ref 150–400)
POTASSIUM SERPL-MCNC: 4.4 MMOL/L — SIGNIFICANT CHANGE UP (ref 3.5–5.3)
POTASSIUM SERPL-SCNC: 4.4 MMOL/L — SIGNIFICANT CHANGE UP (ref 3.5–5.3)
PROT SERPL-MCNC: 7.4 GM/DL — SIGNIFICANT CHANGE UP (ref 6–8.3)
RBC # BLD: 3.62 M/UL — LOW (ref 4.2–5.8)
RBC # FLD: 15.2 % — HIGH (ref 10.3–14.5)
SODIUM SERPL-SCNC: 133 MMOL/L — LOW (ref 135–145)
TRIGL SERPL-MCNC: 63 MG/DL — SIGNIFICANT CHANGE UP
TROPONIN I, HIGH SENSITIVITY RESULT: 5.72 NG/L — SIGNIFICANT CHANGE UP
TSH SERPL-MCNC: 0.24 UU/ML — LOW (ref 0.34–4.82)
WBC # BLD: 13.84 K/UL — HIGH (ref 3.8–10.5)
WBC # FLD AUTO: 13.84 K/UL — HIGH (ref 3.8–10.5)

## 2023-07-09 PROCEDURE — 99223 1ST HOSP IP/OBS HIGH 75: CPT

## 2023-07-09 PROCEDURE — 93926 LOWER EXTREMITY STUDY: CPT | Mod: 26,RT

## 2023-07-09 PROCEDURE — 99233 SBSQ HOSP IP/OBS HIGH 50: CPT

## 2023-07-09 PROCEDURE — 71045 X-RAY EXAM CHEST 1 VIEW: CPT | Mod: 26

## 2023-07-09 PROCEDURE — 76700 US EXAM ABDOM COMPLETE: CPT | Mod: 26

## 2023-07-09 RX ORDER — CHOLECALCIFEROL (VITAMIN D3) 125 MCG
2000 CAPSULE ORAL DAILY
Refills: 0 | Status: DISCONTINUED | OUTPATIENT
Start: 2023-07-10 | End: 2023-07-14

## 2023-07-09 RX ORDER — TIOTROPIUM BROMIDE 18 UG/1
2 CAPSULE ORAL; RESPIRATORY (INHALATION) DAILY
Refills: 0 | Status: DISCONTINUED | OUTPATIENT
Start: 2023-07-09 | End: 2023-07-14

## 2023-07-09 RX ADMIN — HEPARIN SODIUM 5000 UNIT(S): 5000 INJECTION INTRAVENOUS; SUBCUTANEOUS at 14:06

## 2023-07-09 RX ADMIN — Medication 40 MILLIGRAM(S): at 06:35

## 2023-07-09 RX ADMIN — Medication 2 MILLIGRAM(S): at 14:06

## 2023-07-09 RX ADMIN — Medication 1 TABLET(S): at 11:34

## 2023-07-09 RX ADMIN — Medication 81 MILLIGRAM(S): at 11:34

## 2023-07-09 RX ADMIN — Medication 100 MILLIGRAM(S): at 11:35

## 2023-07-09 RX ADMIN — Medication 2 MILLIGRAM(S): at 11:36

## 2023-07-09 RX ADMIN — SPIRONOLACTONE 25 MILLIGRAM(S): 25 TABLET, FILM COATED ORAL at 06:36

## 2023-07-09 RX ADMIN — Medication 1 MILLIGRAM(S): at 11:35

## 2023-07-09 RX ADMIN — Medication 25 MILLIGRAM(S): at 21:12

## 2023-07-09 RX ADMIN — BUDESONIDE AND FORMOTEROL FUMARATE DIHYDRATE 2 PUFF(S): 160; 4.5 AEROSOL RESPIRATORY (INHALATION) at 21:06

## 2023-07-09 RX ADMIN — BUDESONIDE AND FORMOTEROL FUMARATE DIHYDRATE 2 PUFF(S): 160; 4.5 AEROSOL RESPIRATORY (INHALATION) at 07:47

## 2023-07-09 RX ADMIN — GABAPENTIN 400 MILLIGRAM(S): 400 CAPSULE ORAL at 06:36

## 2023-07-09 RX ADMIN — Medication 180 MILLIGRAM(S): at 06:35

## 2023-07-09 RX ADMIN — GABAPENTIN 400 MILLIGRAM(S): 400 CAPSULE ORAL at 21:12

## 2023-07-09 RX ADMIN — PANTOPRAZOLE SODIUM 40 MILLIGRAM(S): 20 TABLET, DELAYED RELEASE ORAL at 06:35

## 2023-07-09 RX ADMIN — Medication 2 MILLIGRAM(S): at 06:36

## 2023-07-09 RX ADMIN — Medication 40 MILLIGRAM(S): at 13:59

## 2023-07-09 RX ADMIN — Medication 1 MILLIGRAM(S): at 21:14

## 2023-07-09 RX ADMIN — Medication 10 MILLIGRAM(S): at 11:24

## 2023-07-09 RX ADMIN — HEPARIN SODIUM 5000 UNIT(S): 5000 INJECTION INTRAVENOUS; SUBCUTANEOUS at 06:36

## 2023-07-09 RX ADMIN — GABAPENTIN 400 MILLIGRAM(S): 400 CAPSULE ORAL at 14:05

## 2023-07-09 RX ADMIN — PREGABALIN 1000 MICROGRAM(S): 225 CAPSULE ORAL at 11:36

## 2023-07-09 RX ADMIN — Medication 25 MILLIGRAM(S): at 11:35

## 2023-07-09 RX ADMIN — Medication 2 MILLIGRAM(S): at 02:49

## 2023-07-09 RX ADMIN — Medication 10 MILLIGRAM(S): at 21:14

## 2023-07-09 RX ADMIN — HEPARIN SODIUM 5000 UNIT(S): 5000 INJECTION INTRAVENOUS; SUBCUTANEOUS at 21:12

## 2023-07-09 RX ADMIN — Medication 1.5 MILLIGRAM(S): at 17:53

## 2023-07-09 NOTE — CONSULT NOTE ADULT - SUBJECTIVE AND OBJECTIVE BOX
HPI:  67 y/o male with PMH of HFpEF, paroxysmal atrial fibrillation s/p Watchman, mod MR, COPD on 4 L of home O2,  ETOH induced cirrhosis, Hx of DT s/p trach now decannulated, HTN, obesity, chronic resp failure on home O2 4L, VIJAY (noncompliant w/ CPAP), alcoholism, severe pulmonary HTN, Left BKA, PAD, presents to  with 1 day history of difficulty breathing, productive cough, wheezing. Recent admission for COPD exacerbation with CHF, and has been having sob at home the past 1 day. He is in assisted living and drinks daily. He misses medications at times, and reports he "may have" missed lasix, which he was on 60mg TID up until recently. He has SOB with orthopnea. He also had significant improvement with IV lasix in ED. At time of evaluation he is pleasant and has a slight tremor with tongue fasciculations which his family says he does not do. ROS neg for CP, n/v/d ha vision changes, or foot pain.    Patient notes mild "neutral" colored secretions. No hemoptysis. States he is feeling better since receiving lasix. Denies chest pain.      PAST MEDICAL & SURGICAL HISTORY:  Chronic atrial fibrillation      Alcohol abuse      Poor historian      CHF (congestive heart failure)      Cirrhosis      Emphysema of lung      Alcohol withdrawal      Collapsed lung      Meningitis      Falls      Anemia      Chronic obstructive pulmonary disease (COPD)      GERD (gastroesophageal reflux disease)      Hypertension      Presence of Watchman left atrial appendage closure device      Atrial fibrillation      COPD without exacerbation      Chronic CHF      S/P BKA (below knee amputation) unilateral, left      Presence of Watchman left atrial appendage closure device      Unilateral amputation of lower extremity below knee      H/O tracheostomy  now removed      S/P percutaneous endoscopic gastrostomy (PEG) tube placement  now removed          MEDICATIONS  (STANDING):  aspirin enteric coated 81 milliGRAM(s) Oral daily  budesonide 160 MICROgram(s)/formoterol 4.5 MICROgram(s) Inhaler 2 Puff(s) Inhalation two times a day  busPIRone 10 milliGRAM(s) Oral two times a day  cyanocobalamin 1000 MICROGram(s) Oral daily  diltiazem    milliGRAM(s) Oral daily  doxazosin 1 milliGRAM(s) Oral at bedtime  folic acid 1 milliGRAM(s) Oral daily  furosemide   Injectable 40 milliGRAM(s) IV Push two times a day  gabapentin 400 milliGRAM(s) Oral three times a day  heparin   Injectable 5000 Unit(s) SubCutaneous every 8 hours  LORazepam     Tablet 1.5 milliGRAM(s) Oral every 4 hours  LORazepam     Tablet   Oral   LORazepam     Tablet 2 milliGRAM(s) Oral every 4 hours  metoprolol tartrate 25 milliGRAM(s) Oral two times a day  multivitamin 1 Tablet(s) Oral daily  pantoprazole    Tablet 40 milliGRAM(s) Oral before breakfast  QUEtiapine 100 milliGRAM(s) Oral at bedtime  spironolactone 25 milliGRAM(s) Oral daily  thiamine 100 milliGRAM(s) Oral daily  tiotropium 2.5 MICROgram(s) Inhaler 2 Puff(s) Inhalation daily    MEDICATIONS  (PRN):  acetaminophen     Tablet .. 650 milliGRAM(s) Oral every 6 hours PRN Temp greater or equal to 38C (100.4F), Mild Pain (1 - 3)  albuterol    90 MICROgram(s) HFA Inhaler 2 Puff(s) Inhalation every 6 hours PRN Shortness of Breath and/or Wheezing  LORazepam     Tablet 1 milliGRAM(s) Oral every 2 hours PRN CIWA-Ar score increase by 2 points and a total score of 7 or less  LORazepam   Injectable 1 milliGRAM(s) IV Push every 1 hour PRN CIWA-Ar score 8 or greater      Allergies    Ceclor (Rash)  Duricef (Rash)    Intolerances        SOCIAL HISTORY: Denies tobacco, etoh abuse or illicit drug use    FAMILY HISTORY:  No pertinent family history in first degree relatives        Vital Signs Last 24 Hrs  T(C): 36.4 (09 Jul 2023 09:00), Max: 36.7 (08 Jul 2023 15:20)  T(F): 97.6 (09 Jul 2023 09:00), Max: 98 (08 Jul 2023 15:20)  HR: 95 (09 Jul 2023 09:00) (85 - 100)  BP: 116/61 (09 Jul 2023 09:00) (109/55 - 128/67)  BP(mean): --  RR: 18 (09 Jul 2023 09:00) (18 - 20)  SpO2: 95% (09 Jul 2023 09:00) (94% - 98%)    Parameters below as of 09 Jul 2023 09:00  Patient On (Oxygen Delivery Method): nasal cannula  O2 Flow (L/min): 4      REVIEW OF SYSTEMS:    CONSTITUTIONAL:  As per HPI.  HEENT:  Eyes:  No diplopia or blurred vision. ENT:  No earache, sore throat or runny nose.  CARDIOVASCULAR:  No pressure, squeezing, tightness, heaviness or aching about the chest, neck, axilla or epigastrium.  RESPIRATORY:  See HPI  GASTROINTESTINAL:  No nausea, vomiting or diarrhea.  GENITOURINARY:  No dysuria, frequency or urgency.  MUSCULOSKELETAL:  As per HPI.  SKIN:  No change in skin, hair or nails.  NEUROLOGIC:  No paresthesias, fasciculations, seizures or weakness.  PSYCHIATRIC:  No disorder of thought or mood.  ENDOCRINE:  No heat or cold intolerance, polyuria or polydipsia.  HEMATOLOGICAL:  No easy bruising or bleedings:  .     PHYSICAL EXAMINATION:    GENERAL APPEARANCE:  Pt. is not currently dyspneic, in no distress. Pt. is alert, oriented, and pleasant.  HEENT:  Pupils are normal and react normally. No icterus. Mucous membranes well colored.  NECK:  Supple. No lymphadenopathy. Jugular venous pressure not elevated.   HEART:   The cardiac impulse has a normal quality. Irregularly, irreg. Normal S1 and S2.   CHEST:  Chest iwith decreased BS at bases, R>L. Basilar crackles with R>L. Increased respiratory effort.  ABDOMEN:  Soft and nontender.   EXTREMITIES:  There is no cyanosis, clubbing. 1+ edema of the RLE with chronic changes.   SKIN:  No rash or significant lesions are noted.    LABS:                        11.2   13.84 )-----------( 230      ( 09 Jul 2023 07:07 )             34.0     07-09    133<L>  |  99  |  14  ----------------------------<  169<H>  4.4   |  28  |  0.88    Ca    9.0      09 Jul 2023 07:07  Phos  3.0     07-09  Mg     1.8     07-09    TPro  7.4  /  Alb  2.7<L>  /  TBili  0.4  /  DBili  x   /  AST  8<L>  /  ALT  13  /  AlkPhos  93  07-09    LIVER FUNCTIONS - ( 09 Jul 2023 07:07 )  Alb: 2.7 g/dL / Pro: 7.4 gm/dL / ALK PHOS: 93 U/L / ALT: 13 U/L / AST: 8 U/L / GGT: x             CARDIAC MARKERS ( 09 Jul 2023 07:07 )  x     / x     / 24 U/L / x     / x          Urinalysis Basic - ( 09 Jul 2023 07:07 )    Color: x / Appearance: x / SG: x / pH: x  Gluc: 169 mg/dL / Ketone: x  / Bili: x / Urobili: x   Blood: x / Protein: x / Nitrite: x   Leuk Esterase: x / RBC: x / WBC x   Sq Epi: x / Non Sq Epi: x / Bacteria: x          RADIOLOGY & ADDITIONAL STUDIES: < from: Xray Chest 1 View- PORTABLE-Urgent (07.08.23 @ 11:51) >    ACC: [id number] EXAM:  XR CHEST PORTABLE URGENT 1V   ORDERED BY: GADIEL KULKARNI     PROCEDURE DATE:  07/08/2023          INTERPRETATION:  Chest radiograph (one view)     CPT 49209    CLINICAL INFORMATION: Short of breath.    TECHNIQUE:  Single frontalview of the chest was obtained.    FINDINGS:  Prior study dated 6/2/2023 was available for review.    The lungs imaged increased pulmonary vascular congestion. No gross   consolidation is seen. No pleural effusion is seen. The heart is enlarged.      IMPRESSION: Cardiomegaly. Increased pulmonary vascular congestion noted.      ROSIE MUNIZ MD;

## 2023-07-09 NOTE — CONSULT NOTE ADULT - ASSESSMENT
Assessment/Plan:    - Dibrodiee as darryl.  - Low salt diet  - Aerosols with Symbicort. Spiriva added  - BiPAP nocturnally  - Follow BUN/Cr  - Monitor O2 Sats

## 2023-07-09 NOTE — PROGRESS NOTE ADULT - ASSESSMENT
67 y/o man with obesity, HTN, HLD, HFpEF, mod MR, paroxysmal afib s/p Wathcman, VIJAY non adherent to CPAP, COPD, pulmonary HTN, chronic respiratory failure with hypoxia and hypercapnea, on home O2 4L/min, alcohol use disorder with alcohol-induced cirrhosis, hx of DTs, PAD hx of L BKA, recent admission in 5/29-6/5 for Parainfluenza infection associated with COPD exacerbation and probable CHF exacerbation, returns 7/8/23 with progressively worsening dyspnea, likely missed Lasix doses, also slight tremor with tongue fasciculations in the setting of daily alcohol use. Admitted for further management.    Acute on chronic diastolic CHF  In setting of non adherence to medications, admits to skipping furosemide and other medications while drinking alcohol frequently, excessively. Signs and symptoms consistent with fluid overload. Appreciate input from Cardiology  - TTE ordered  - GDMT with metoprolol, spironolactone, diuresis with Lasix IV  - Is and Os, daily weight  - Trend lytes and Cr  - Avoid added salt in diet, fluid restrict to 1.5L/day    Permanent atrial fibrillation  HR suboptimally controlled. Was in RVR in ED. Increased Cardizem DC to 180mg daily. Continued beta blocker. Not on AC as he is s/p Watchman. Has hx of GI bleed.   - Continue Cardizem CD and metoprolol    Moderate mitral regurgitation  As per past echo, repeat echo pending  - F/u TTE    Alcohol use disorder with mild withdrawal  Improved  - Continue CIWA protocol  - Thiamine, MVI    Alcoholic cirrhosis  No encephalopathy. No ascites. Unknown as to presence of varices. Splenic hypodensity noted on CT.   - Continue diuretics.   - Outpatient follow up advised.    VIJAY  Nonadherent to CPAP  - Ordered for NIPPV tonight and encouraged its use    COPD on home O2  Stable. Appreciate Pulmonary Medicine input  - Continue O2 supplementation  - Continue Symbicort, added Spiriva    Pulmonary nodules in MICHELLE and RLL  As noted on CT  - Repeat CT 6 wks    Chronic hyponatremia  Stable. In setting of CHF and cirrhosis. Mentation at baseline.   - Continue to monitor    Normocytic anemia  Did have an episode of rectal bleeding prior to admission, suspected to be hemorrhoidal. He does have hx of R ischemic colitis in past and underlying cirrhosis / alcohol use disorder that could also be responsible for his chronic anemia. No overt bleeding this admission. Per Dr Gandolfo, can follow up outpatient for possible EGD and colonoscopy.   - Outpatient follow up    Vit D deficiency  25-OH vit D 17 ng/mL.   - Continue Vit D replacement

## 2023-07-09 NOTE — CONSULT NOTE ADULT - SUBJECTIVE AND OBJECTIVE BOX
CHIEF COMPLAINT: Patient is a 68y old  Male who presents with a chief complaint of chf exacerbation with etoh withdrawal (08 Jul 2023 17:52)      HPI:  69 y/o male with PMH of HFpEF, paroxysmal atrial fibrillation s/p Watchman, mod MR, COPD on 4 L of home O2,  ETOH induced cirrhosis, Hx of DT s/p trach now decannulated, HTN, obesity, chronic resp failure on home O2 4L, VIJAY (noncompliant w/ CPAP), alcoholism, severe pulmonary HTN, Left BKA, PAD, presents to  with 1 day history of difficulty breathing, productive cough, wheezing. Recent admission for COPD exacerbation with CHF, and has been having sob at home the past 1 day. He is in assisted living and drinks daily. He misses medications at times, and reports he "may have" missed lasix, which he was on 60mg TID up until recently. He has SOB with orthopnea. He also had significant improvement with IV lasix in ED. At time of evaluation he is pleasant and has a slight tremor with tongue fasciculations which his family says he does not do. ROS neg for CP, n/v/d ha vision changes, or foot pain.    Cardiology consulted for CHF. Pt. was recently admitted between 5/29-6/5/23 for decompensated HFpEF. Now with increasing dyspnea in the setting of non-compliance with meds - admits to skipping lasix and other meds - and ETOH abuse - admits to recent alcohol use.     PAST MEDICAL & SURGICAL HISTORY:  Chronic atrial fibrillation      Alcohol abuse      Poor historian      CHF (congestive heart failure)      Cirrhosis      Emphysema of lung      Alcohol withdrawal      Collapsed lung      Meningitis      Falls      Anemia      Chronic obstructive pulmonary disease (COPD)      GERD (gastroesophageal reflux disease)      Hypertension      Presence of Watchman left atrial appendage closure device      Atrial fibrillation      COPD without exacerbation      Chronic CHF      S/P BKA (below knee amputation) unilateral, left      Presence of Watchman left atrial appendage closure device      Unilateral amputation of lower extremity below knee      H/O tracheostomy  now removed      S/P percutaneous endoscopic gastrostomy (PEG) tube placement  now removed      SOCIAL HISTORY:   Alcohol: Denied  Smoking: Nonsmoker  Drug Use: Denied  Marital Status:     FAMILY HISTORY: FAMILY HISTORY:  No pertinent family history in first degree relatives    MEDICATIONS  (STANDING):  aspirin enteric coated 81 milliGRAM(s) Oral daily  budesonide 160 MICROgram(s)/formoterol 4.5 MICROgram(s) Inhaler 2 Puff(s) Inhalation two times a day  busPIRone 10 milliGRAM(s) Oral two times a day  cyanocobalamin 1000 MICROGram(s) Oral daily  diltiazem    milliGRAM(s) Oral daily  doxazosin 1 milliGRAM(s) Oral at bedtime  folic acid 1 milliGRAM(s) Oral daily  furosemide   Injectable 40 milliGRAM(s) IV Push two times a day  gabapentin 400 milliGRAM(s) Oral three times a day  heparin   Injectable 5000 Unit(s) SubCutaneous every 8 hours  LORazepam     Tablet 2 milliGRAM(s) Oral every 4 hours  LORazepam     Tablet   Oral   LORazepam     Tablet 1.5 milliGRAM(s) Oral every 4 hours  metoprolol tartrate 25 milliGRAM(s) Oral two times a day  multivitamin 1 Tablet(s) Oral daily  pantoprazole    Tablet 40 milliGRAM(s) Oral before breakfast  QUEtiapine 100 milliGRAM(s) Oral at bedtime  spironolactone 25 milliGRAM(s) Oral daily  thiamine 100 milliGRAM(s) Oral daily    MEDICATIONS  (PRN):  acetaminophen     Tablet .. 650 milliGRAM(s) Oral every 6 hours PRN Temp greater or equal to 38C (100.4F), Mild Pain (1 - 3)  albuterol    90 MICROgram(s) HFA Inhaler 2 Puff(s) Inhalation every 6 hours PRN Shortness of Breath and/or Wheezing  LORazepam     Tablet 1 milliGRAM(s) Oral every 2 hours PRN CIWA-Ar score increase by 2 points and a total score of 7 or less  LORazepam   Injectable 1 milliGRAM(s) IV Push every 1 hour PRN CIWA-Ar score 8 or greater      Allergies    Ceclor (Rash)  Duricef (Rash)    Intolerances        REVIEW OF SYSTEMS:  CONSTITUTIONAL: No weakness, fevers or chills  Eyes: No visual changes  NECK: No pain or stiffness  RESPIRATORY: + cough, + wheezing, + shortness of breath  CARDIOVASCULAR: No chest pain or palpitations  GASTROINTESTINAL: No abdominal pain. No nausea, vomiting, or hematemesis; No diarrhea or constipation. No melena or hematochezia.  GENITOURINARY: No dysuria, frequency or hematuria  NEUROLOGICAL: No numbness.  SKIN: No itching or rash  All other review of systems is negative unless indicated above    VITAL SIGNS:   Vital Signs Last 24 Hrs  T(C): 36.4 (09 Jul 2023 09:00), Max: 36.8 (08 Jul 2023 13:49)  T(F): 97.6 (09 Jul 2023 09:00), Max: 98.3 (08 Jul 2023 13:49)  HR: 95 (09 Jul 2023 09:00) (85 - 100)  BP: 116/61 (09 Jul 2023 09:00) (109/55 - 128/67)  BP(mean): 77 (08 Jul 2023 13:49) (77 - 77)  RR: 18 (09 Jul 2023 09:00) (13 - 20)  SpO2: 95% (09 Jul 2023 09:00) (94% - 98%)    Parameters below as of 09 Jul 2023 09:00  Patient On (Oxygen Delivery Method): nasal cannula  O2 Flow (L/min): 4      I&O's Summary      PHYSICAL EXAM:  Constitutional: NAD, awake and alert  HEENT:  EOMI,  Pupils round, No oral cyanosis.  Pulmonary: Non-labored, breath sounds are clear bilaterally, No wheezing, rales or rhonchi  Cardiovascular: S1 and S2, regular rate and rhythm, no Murmurs, gallops or rubs  Gastrointestinal: Bowel Sounds present, soft, nontender.   Lymph: No peripheral edema. No cervical lymphadenopathy.  Neurological: Alert, no focal deficits  Skin: No rashes.  Psych:  Mood & affect appropriate    LABS:                        11.2   13.84 )-----------( 230      ( 09 Jul 2023 07:07 )             34.0                         11.8   10.46 )-----------( 201      ( 08 Jul 2023 09:51 )             35.7     09 Jul 2023 07:07    133    |  99     |  14     ----------------------------<  169    4.4     |  28     |  0.88   08 Jul 2023 09:51    138    |  101    |  7      ----------------------------<  117    3.4     |  33     |  0.92     Ca    9.0        09 Jul 2023 07:07  Ca    8.9        08 Jul 2023 09:51  Phos  3.0       09 Jul 2023 07:07  Mg     1.8       09 Jul 2023 07:07    TPro  7.4    /  Alb  2.7    /  TBili  0.4    /  DBili  x      /  AST  8      /  ALT  13     /  AlkPhos  93     09 Jul 2023 07:07  TPro  7.3    /  Alb  2.8    /  TBili  0.5    /  DBili  x      /  AST  12     /  ALT  13     /  AlkPhos  115    08 Jul 2023 09:51      CARDIAC MARKERS ( 09 Jul 2023 07:07 )  x     / x     / 24 U/L / x     / x            07-09 @ 07:07  TSH: 0.24    Radiology/Echo/EKG: reviewed     EKG afib rate 101 with normal axis intervals    < from: Xray Chest 1 View- PORTABLE-Urgent (07.08.23 @ 11:51) >  ACC: [id number] EXAM:  XR CHEST PORTABLE URGENT 1V   ORDERED BY: GADIEL KULKARNI     PROCEDURE DATE:  07/08/2023          INTERPRETATION:  Chest radiograph (one view)     CPT 54951    CLINICAL INFORMATION: Short of breath.    TECHNIQUE:  Single frontalview of the chest was obtained.    FINDINGS:  Prior study dated 6/2/2023 was available for review.    The lungs imaged increased pulmonary vascular congestion. No gross   consolidation is seen. No pleural effusion is seen. The heart is enlarged.      IMPRESSION: Cardiomegaly. Increased pulmonary vascular congestion noted.    < end of copied text >    ----------------------------------------------------------------------------------------------------------------  < from: TTE Echo Complete w/o Contrast w/ Doppler (06.30.21 @ 14:49) >   Impression     Summary     Mild concentric left ventricular hypertrophy is present.   Estimated left ventricular ejection fraction is 60-65 %.   The left atrium is severely dilated.   The right atrium is not well seen.   The right ventricle is not well seen, appears grossly normal.   The aortic valve is trileafletwith thin pliable leaflets.   Fibrocalcific changes noted to the mitral valve leaflets with preserved   leaflet excursion.   Highly eccentric mitral regurgitation is noted.(? severity-study limited))   The tricuspid valve leaflets are thin and pliable; valve motion is normal.   Moderate (2+) tricuspid valve regurgitation is present.   Moderate pulmonary hypertension.   No evidence of pericardial effusion.     Signature     ----------------------------------------------------------------   Electronically signed by Chapito Sim MD(Interpreting   physician) on 06/30/2021 05:43 PM    < end of copied text >  ----------------------------------------------------------------------------------------------------------------------------------------

## 2023-07-09 NOTE — PROGRESS NOTE ADULT - SUBJECTIVE AND OBJECTIVE BOX
Chief Complaint: Shortness of breath, chest congestion    Interval Hx: Patient continues to report dyspnea, worse with exertion, has congestion in chest, no chest pain or tightness. He has RLE edema. LLE has amputation. He has no other acute complaints.     ROS: Multi system review is comprehensively negative x 10 systems except as above    Vitals:  T(F): 97.6 (09 Jul 2023 09:00), Max: 97.6 (09 Jul 2023 09:00)  HR: 95 (09 Jul 2023 09:00) (85 - 95)  BP: 116/61 (09 Jul 2023 09:00) (110/58 - 128/67)  RR: 18 (09 Jul 2023 09:00) (18 - 20)  SpO2: 95% (09 Jul 2023 09:00) (94% - 98%) on 4L/min (same as at home)    Exam:  Gen: No acute distress   HEENT: NCAT PERRL EOMI  MMM   clear oropharynx  Neck: Supple  CVS: s1 s2 normal, regular, rate ~90  Chest: Normal resp effort at rest, wet crackles B/L throughout  Abd: Obese, +BS, non distended, soft, non tender  Ext: RLE edema, LLE amputation  Skin: Warm, dry  Mood: Calm, pleasant  Neuro: A+OX3, no gross deficits, no tremulousness, no asterixis    Labs:                  11.2   13.84 )--------( 230             34.0       133  |  99  |  14  ----------------------<  169  4.4   |  28  |  0.88    Ca 9.0   Phos 3.0   Mg 1.8    TPro  7.4  /  Alb  2.7  /  TBili  0.4  /  DBili  x   /  AST  8  /  ALT  13  /  AlkPhos  93      Lactate 2.2   Troponin negative   proBNP 1755      A1c 5.9    TSH 0.24    VBG pH 7.38  pCO2 60  pO2 40  O2 62%    COVID19 PCR negative  Flu PCR negative  RSV PCR negative    Cardiac Testing:  EKG 7/8: Afib, rate 101, normal axis, normal intervals    Imaging:  CXR 7/8: The lungs imaged increased pulmonary vascular congestion. No gross consolidation is seen. No pleural effusion is seen. The heart is enlarged.    Meds:  MEDICATIONS  (STANDING):  aspirin enteric coated 81 milliGRAM(s) Oral daily  budesonide 160 MICROgram(s)/formoterol 4.5 MICROgram(s) Inhaler 2 Puff(s) Inhalation two times a day  busPIRone 10 milliGRAM(s) Oral two times a day  cyanocobalamin 1000 MICROGram(s) Oral daily  diltiazem    milliGRAM(s) Oral daily  doxazosin 1 milliGRAM(s) Oral at bedtime  folic acid 1 milliGRAM(s) Oral daily  furosemide   Injectable 40 milliGRAM(s) IV Push two times a day  gabapentin 400 milliGRAM(s) Oral three times a day  heparin   Injectable 5000 Unit(s) SubCutaneous every 8 hours  LORazepam     Tablet 1.5 milliGRAM(s) Oral every 4 hours  metoprolol tartrate 25 milliGRAM(s) Oral two times a day  multivitamin 1 Tablet(s) Oral daily  pantoprazole    Tablet 40 milliGRAM(s) Oral before breakfast  QUEtiapine 100 milliGRAM(s) Oral at bedtime  spironolactone 25 milliGRAM(s) Oral daily  thiamine 100 milliGRAM(s) Oral daily  tiotropium 2.5 MICROgram(s) Inhaler 2 Puff(s) Inhalation daily    MEDICATIONS  (PRN):  acetaminophen     Tablet .. 650 milliGRAM(s) Oral every 6 hours PRN Temp greater or equal to 38C (100.4F), Mild Pain (1 - 3)  albuterol    90 MICROgram(s) HFA Inhaler 2 Puff(s) Inhalation every 6 hours PRN Shortness of Breath and/or Wheezing  LORazepam     Tablet 1 milliGRAM(s) Oral every 2 hours PRN CIWA-Ar score increase by 2 points and a total score of 7 or less  LORazepam   Injectable 1 milliGRAM(s) IV Push every 1 hour PRN CIWA-Ar score 8 or greater

## 2023-07-09 NOTE — CONSULT NOTE ADULT - PROBLEM SELECTOR RECOMMENDATION 3
pt. with long Hx of ETOH abuse, non compliance with meds, multiple admissions, admits to recent drinking, on CIWA protocol, management as per Hospitalist

## 2023-07-10 DIAGNOSIS — I50.30 UNSPECIFIED DIASTOLIC (CONGESTIVE) HEART FAILURE: ICD-10-CM

## 2023-07-10 LAB
ANION GAP SERPL CALC-SCNC: 3 MMOL/L — LOW (ref 5–17)
BASE EXCESS BLDV CALC-SCNC: 9.6 MMOL/L — HIGH (ref -2–3)
BUN SERPL-MCNC: 20 MG/DL — SIGNIFICANT CHANGE UP (ref 7–23)
CALCIUM SERPL-MCNC: 9.3 MG/DL — SIGNIFICANT CHANGE UP (ref 8.5–10.1)
CHLORIDE SERPL-SCNC: 100 MMOL/L — SIGNIFICANT CHANGE UP (ref 96–108)
CO2 SERPL-SCNC: 32 MMOL/L — HIGH (ref 22–31)
CREAT SERPL-MCNC: 0.92 MG/DL — SIGNIFICANT CHANGE UP (ref 0.5–1.3)
EGFR: 92 ML/MIN/1.73M2 — SIGNIFICANT CHANGE UP
GLUCOSE SERPL-MCNC: 113 MG/DL — HIGH (ref 70–99)
HCO3 BLDV-SCNC: 38 MMOL/L — HIGH (ref 22–29)
PCO2 BLDV: 71 MMHG — HIGH (ref 42–55)
PH BLDV: 7.34 — SIGNIFICANT CHANGE UP (ref 7.32–7.43)
PO2 BLDV: 50 MMHG — HIGH (ref 25–45)
POTASSIUM SERPL-MCNC: 4.4 MMOL/L — SIGNIFICANT CHANGE UP (ref 3.5–5.3)
POTASSIUM SERPL-SCNC: 4.4 MMOL/L — SIGNIFICANT CHANGE UP (ref 3.5–5.3)
SAO2 % BLDV: 78 % — SIGNIFICANT CHANGE UP (ref 67–88)
SODIUM SERPL-SCNC: 135 MMOL/L — SIGNIFICANT CHANGE UP (ref 135–145)

## 2023-07-10 PROCEDURE — 99233 SBSQ HOSP IP/OBS HIGH 50: CPT

## 2023-07-10 RX ORDER — SPIRONOLACTONE 25 MG/1
50 TABLET, FILM COATED ORAL DAILY
Refills: 0 | Status: DISCONTINUED | OUTPATIENT
Start: 2023-07-10 | End: 2023-07-14

## 2023-07-10 RX ORDER — FUROSEMIDE 40 MG
80 TABLET ORAL
Refills: 0 | Status: DISCONTINUED | OUTPATIENT
Start: 2023-07-10 | End: 2023-07-13

## 2023-07-10 RX ADMIN — PREGABALIN 1000 MICROGRAM(S): 225 CAPSULE ORAL at 10:00

## 2023-07-10 RX ADMIN — HEPARIN SODIUM 5000 UNIT(S): 5000 INJECTION INTRAVENOUS; SUBCUTANEOUS at 13:49

## 2023-07-10 RX ADMIN — Medication 25 MILLIGRAM(S): at 10:01

## 2023-07-10 RX ADMIN — Medication 650 MILLIGRAM(S): at 21:16

## 2023-07-10 RX ADMIN — PANTOPRAZOLE SODIUM 40 MILLIGRAM(S): 20 TABLET, DELAYED RELEASE ORAL at 05:44

## 2023-07-10 RX ADMIN — GABAPENTIN 400 MILLIGRAM(S): 400 CAPSULE ORAL at 21:17

## 2023-07-10 RX ADMIN — Medication 81 MILLIGRAM(S): at 09:59

## 2023-07-10 RX ADMIN — Medication 25 MILLIGRAM(S): at 21:17

## 2023-07-10 RX ADMIN — HEPARIN SODIUM 5000 UNIT(S): 5000 INJECTION INTRAVENOUS; SUBCUTANEOUS at 21:16

## 2023-07-10 RX ADMIN — Medication 40 MILLIGRAM(S): at 13:49

## 2023-07-10 RX ADMIN — Medication 40 MILLIGRAM(S): at 05:43

## 2023-07-10 RX ADMIN — GABAPENTIN 400 MILLIGRAM(S): 400 CAPSULE ORAL at 05:44

## 2023-07-10 RX ADMIN — Medication 100 MILLIGRAM(S): at 10:01

## 2023-07-10 RX ADMIN — Medication 10 MILLIGRAM(S): at 10:01

## 2023-07-10 RX ADMIN — BUDESONIDE AND FORMOTEROL FUMARATE DIHYDRATE 2 PUFF(S): 160; 4.5 AEROSOL RESPIRATORY (INHALATION) at 20:06

## 2023-07-10 RX ADMIN — Medication 1 MILLIGRAM(S): at 21:17

## 2023-07-10 RX ADMIN — TIOTROPIUM BROMIDE 2 PUFF(S): 18 CAPSULE ORAL; RESPIRATORY (INHALATION) at 09:12

## 2023-07-10 RX ADMIN — BUDESONIDE AND FORMOTEROL FUMARATE DIHYDRATE 2 PUFF(S): 160; 4.5 AEROSOL RESPIRATORY (INHALATION) at 09:12

## 2023-07-10 RX ADMIN — Medication 1 TABLET(S): at 10:00

## 2023-07-10 RX ADMIN — Medication 650 MILLIGRAM(S): at 22:00

## 2023-07-10 RX ADMIN — Medication 1 MILLIGRAM(S): at 10:00

## 2023-07-10 RX ADMIN — GABAPENTIN 400 MILLIGRAM(S): 400 CAPSULE ORAL at 13:55

## 2023-07-10 RX ADMIN — SPIRONOLACTONE 25 MILLIGRAM(S): 25 TABLET, FILM COATED ORAL at 05:44

## 2023-07-10 RX ADMIN — Medication 180 MILLIGRAM(S): at 05:44

## 2023-07-10 RX ADMIN — QUETIAPINE FUMARATE 100 MILLIGRAM(S): 200 TABLET, FILM COATED ORAL at 21:18

## 2023-07-10 RX ADMIN — Medication 10 MILLIGRAM(S): at 21:18

## 2023-07-10 RX ADMIN — ALBUTEROL 2 PUFF(S): 90 AEROSOL, METERED ORAL at 11:00

## 2023-07-10 RX ADMIN — Medication 1.5 MILLIGRAM(S): at 10:03

## 2023-07-10 RX ADMIN — HEPARIN SODIUM 5000 UNIT(S): 5000 INJECTION INTRAVENOUS; SUBCUTANEOUS at 05:43

## 2023-07-10 RX ADMIN — Medication 2000 UNIT(S): at 13:48

## 2023-07-10 NOTE — PROGRESS NOTE ADULT - SUBJECTIVE AND OBJECTIVE BOX
Subjective:  7/10/23 pt seen and examined sitting at bedside eating breakfast. Reports feeling better. + ALANIZ and cough. +2 pitting edema RLE.     REVIEW OF SYSTEMS:  CONSTITUTIONAL: no fever or chills.  HEENT:  Eyes:  No diplopia or blurred vision. ENT:  No earache, sore throat or runny nose.  CARDIOVASCULAR:  No pressure, squeezing, tightness, heaviness or aching about the chest, neck, axilla or epigastrium.  RESPIRATORY:  bibasilar crackles   GASTROINTESTINAL:  No nausea, vomiting or diarrhea.  GENITOURINARY:  No dysuria, frequency or urgency.    Objective:  Vital Signs Last 24 Hrs  T(C): 36.3 (10 Jul 2023 07:50), Max: 36.9 (10 Jul 2023 04:00)  T(F): 97.3 (10 Jul 2023 07:50), Max: 98.4 (10 Jul 2023 04:00)  HR: 84 (10 Jul 2023 07:50) (75 - 108)  BP: 121/64 (10 Jul 2023 07:50) (113/58 - 146/67)  BP(mean): 79 (10 Jul 2023 00:02) (79 - 79)  RR: 18 (10 Jul 2023 07:50) (18 - 18)  SpO2: 94% (10 Jul 2023 07:50) (91% - 96%)  O2 Parameters below as of 10 Jul 2023 07:50  Patient On (Oxygen Delivery Method): nasal cannula    LABS:                                   11.2   13.84 )-----------( 230      ( 09 Jul 2023 07:07 )             34.0   07-10    135  |  100  |  20  ----------------------------<  113<H>  4.4   |  32<H>  |  0.92    Ca    9.3      10 Jul 2023 06:59  Phos  3.0     07-09  Mg     1.8     07-09    TPro  7.4  /  Alb  2.7<L>  /  TBili  0.4  /  DBili  x   /  AST  8<L>  /  ALT  13  /  AlkPhos  93  07-09    Urinalysis Basic - ( 09 Jul 2023 07:07 )  Color: x / Appearance: x / SG: x / pH: x  Gluc: 169 mg/dL / Ketone: x  / Bili: x / Urobili: x   Blood: x / Protein: x / Nitrite: x   Leuk Esterase: x / RBC: x / WBC x   Sq Epi: x / Non Sq Epi: x / Bacteria: x      PHYSICAL EXAMINATION:  GENERAL APPEARANCE:  Pt. is not currently dyspneic, in no distress. Pt. is alert, oriented, and pleasant.  HEENT:  Pupils are normal and react normally. No icterus. Mucous membranes well colored.  NECK:  Supple. No lymphadenopathy. Jugular venous pressure not elevated.   HEART:   The cardiac impulse has a normal quality. Irregularly, irreg. Normal S1 and S2.   CHEST:  Chest iwith decreased BS at bases, R>L. Basilar crackles with R>L. Increased respiratory effort.  ABDOMEN:  Soft and nontender.   EXTREMITIES:  There is no cyanosis, clubbing. 1+ edema of the RLE with chronic changes. LLE amputation   SKIN:  No rash or significant lesions are noted.    MEDICATIONS  (STANDING):  aspirin enteric coated 81 milliGRAM(s) Oral daily  budesonide 160 MICROgram(s)/formoterol 4.5 MICROgram(s) Inhaler 2 Puff(s) Inhalation two times a day  busPIRone 10 milliGRAM(s) Oral two times a day  cholecalciferol 2000 Unit(s) Oral daily  cyanocobalamin 1000 MICROGram(s) Oral daily  diltiazem    milliGRAM(s) Oral daily  doxazosin 1 milliGRAM(s) Oral at bedtime  folic acid 1 milliGRAM(s) Oral daily  furosemide   Injectable 40 milliGRAM(s) IV Push two times a day  gabapentin 400 milliGRAM(s) Oral three times a day  heparin   Injectable 5000 Unit(s) SubCutaneous every 8 hours  LORazepam     Tablet   Oral   LORazepam     Tablet 1.5 milliGRAM(s) Oral every 4 hours  LORazepam     Tablet 1 milliGRAM(s) Oral every 4 hours  metoprolol tartrate 25 milliGRAM(s) Oral two times a day  multivitamin 1 Tablet(s) Oral daily  pantoprazole    Tablet 40 milliGRAM(s) Oral before breakfast  QUEtiapine 100 milliGRAM(s) Oral at bedtime  spironolactone 25 milliGRAM(s) Oral daily  thiamine 100 milliGRAM(s) Oral daily  tiotropium 2.5 MICROgram(s) Inhaler 2 Puff(s) Inhalation daily    MEDICATIONS  (PRN):  acetaminophen     Tablet .. 650 milliGRAM(s) Oral every 6 hours PRN Temp greater or equal to 38C (100.4F), Mild Pain (1 - 3)  albuterol    90 MICROgram(s) HFA Inhaler 2 Puff(s) Inhalation every 6 hours PRN Shortness of Breath and/or Wheezing  LORazepam     Tablet 1 milliGRAM(s) Oral every 2 hours PRN CIWA-Ar score increase by 2 points and a total score of 7 or less  LORazepam   Injectable 1 milliGRAM(s) IV Push every 1 hour PRN CIWA-Ar score 8 or greater    IMAGING:  Xray Chest 1 View- PORTABLE-Urgent (07.08.23 @ 11:51)   FINDINGS:  Prior study dated 6/2/2023 was available for review.  The lungs imaged increased pulmonary vascular congestion. No gross consolidation is seen. No pleural effusion is seen. The heart is enlarged.  IMPRESSION: Cardiomegaly. Increased pulmonary vascular congestion noted.

## 2023-07-10 NOTE — CONSULT NOTE ADULT - ASSESSMENT
TTE 5/30/23:  EF 60-65%, LVEDd 6.16 IVS 1.33 PWd 1.32 LA Moderately dilated, moderate MR, mild to moderate TR, RSVP 33.  69 y/o male FpEF with a hx of HTN, pHTN paroxysmal atrial fibrillation s/p Watchman, mod MR, PAD s/p L BKA,  COPD (on 4 L of home O2),  ETOH induced cirrhosis, trach, who presents to  on 7/8 with 1 day history of difficulty breathing, productive cough, wheezing. Appears overloaded on exam with normal BPs     TTE 5/30/23:  EF 60-65%, LVEDd 6.16 IVS 1.33 PWd 1.32 LA Moderately dilated, moderate MR, mild to moderate TR, RSVP 33.

## 2023-07-10 NOTE — CONSULT NOTE ADULT - NS ATTEND AMEND GEN_ALL_CORE FT
68 year old male with obesity COPD , chronic diastolic heart failure with worsening fo shortness of breath likely non adherent medication ,   continue IV lasix , encourage patient to continue medication ,
Briefly, 67 y/o M w/ h/o HFpEF, pulm HTN, afib s/p Watchman, PAD s/p L BKA, mod MR, COPD (on home O2), prior tobacco use (20 pk-yr; quit 2013), cirrhosis (by imaging), EtOH use, obesity hypoventilation syndrome presented on 7/8 with dyspnea on exertion and LE edema. Had a recent HF hospitalization (5/27-6/9) along with parainfluenza. Has been living in assisted living facility. Since admission, was given IV lasix 40 mg with some improvement. Continues to report some dyspnea with some orthopnea. On exam, obese, somnolent, JVP appears elevated with significnat respiratory variation, abdomen distended, trace edema w/ venous stasis changes. Labs reviewed - Hb 11.2, BUN/Cr 20/0.92, albumin 2.7, Tbili 0.4, BNP 1755. ABG notable for 7.37/58/69/34 c/w chronic hypercapnea with respiratory variation. ECG afib, low voltage, nl axis. TTE 5/30 reviewed EF 60%, septum 1.3 cm/PW 1.3 cm, mild-mod TR, mod MR, IVC 3 cm. CXR possible L pleural effusion. Overall stage C HF, NYHA class IV with suspected pulmonary HTN likely group II/III from untreated VIJAY.   - increase lasix as above  - inrease moustapha to 25 mg twice/day  - will benefit from Farxiga as an outpatient  - counseled on EtOH abstaining; reports drinks 1-2 glasses 2-3 times/week  - low suspicion of amyloid but given b/l numbness, low voltage and hypertrophy, reasonable to exclude with light chains and Tc-PYP

## 2023-07-10 NOTE — PROGRESS NOTE ADULT - ASSESSMENT
Assessment/Plan:    Acute on Chronic Hypoxemic Resp Failure  Acute on Chronic Heart Failure  Obesity Hypoventilation Syndrome   Afib  EtOH abuse and dependence   COPD    - Diurese as darryl.  - Low salt diet  - Aerosols with Symbicort. Spiriva added  - BiPAP nocturnally  - Follow BUN/Cr  - Monitor O2 Sats

## 2023-07-10 NOTE — CONSULT NOTE ADULT - PROBLEM SELECTOR RECOMMENDATION 9
pt. with recent admission  5/29-6/5/23 for decompensated HFpEF. Now with increasing dyspnea   Recommendation: pt. with fluid overload, elevated pro JSL=[id number], previous TTE from 6/21 shows preserved LVEF, now with exacerbation, fluid overload due to noncompliance with meds and ETOH abuse,  check TTE, GDMT with BB/MRA, diuresis with lasix 40 ivp bid, daily weight, fluid restriction 1.5L/day, recheck proBNP after 48 hrs of diuresis, HF Team consulted
- Volume overloaded  - increase lasix to 80 mg iv BID  - increase spironolactone to 50 mg po daily  - will consider SGLT2i when back on PO diuretics  - amyloid workup (Kappa/Lambda, immunofixation, PYP)   - strict I & Os  - Daily standing weights

## 2023-07-10 NOTE — CONSULT NOTE ADULT - PROBLEM SELECTOR RECOMMENDATION 2
s/p Watchman, no AC due to GIB, now with HF 100s  Recommendation: increase cardizem CD back to home dose 180 mg po daily, cont. BB - can titrate up for rate control if needed, tele monitor
- on diltiazem 120 mg po daily  - on metoprolol tartrate 25 mg po BID  - Has watchman device

## 2023-07-10 NOTE — CONSULT NOTE ADULT - PROBLEM SELECTOR PROBLEM 1
Acute on chronic diastolic congestive heart failure
Acute hypoxemic respiratory failure
(HFpEF) heart failure with preserved ejection fraction

## 2023-07-10 NOTE — CONSULT NOTE ADULT - PROBLEM SELECTOR PROBLEM 2
Chronic atrial fibrillation
Permanent atrial fibrillation
Acute on chronic diastolic congestive heart failure

## 2023-07-10 NOTE — CONSULT NOTE ADULT - REASON FOR ADMISSION
chf exacerbation with etoh withdrawal

## 2023-07-10 NOTE — PROGRESS NOTE ADULT - SUBJECTIVE AND OBJECTIVE BOX
CHIEF COMPLAINT: Patient is a 68y old  Male who presents with a chief complaint of chf exacerbation with etoh withdrawal (08 Jul 2023 17:52)      HPI:  67 y/o male with PMH of HFpEF, paroxysmal atrial fibrillation s/p Watchman, mod MR, COPD on 4 L of home O2,  ETOH induced cirrhosis, Hx of DT s/p trach now decannulated, HTN, obesity, chronic resp failure on home O2 4L, VIJAY (noncompliant w/ CPAP), alcoholism, severe pulmonary HTN, Left BKA, PAD, presents to  with 1 day history of difficulty breathing, productive cough, wheezing. Recent admission for COPD exacerbation with CHF, and has been having sob at home the past 1 day. He is in assisted living and drinks daily. He misses medications at times, and reports he "may have" missed lasix, which he was on 60mg TID up until recently. He has SOB with orthopnea. He also had significant improvement with IV lasix in ED. At time of evaluation he is pleasant and has a slight tremor with tongue fasciculations which his family says he does not do. ROS neg for CP, n/v/d ha vision changes, or foot pain.    Cardiology consulted for CHF. Pt. was recently admitted between 5/29-6/5/23 for decompensated HFpEF. Now with increasing dyspnea in the setting of non-compliance with meds - admits to skipping lasix and other meds - and ETOH abuse - admits to recent alcohol use.     7/10/23: feels better, Tele: NSR    MEDICATIONS  (STANDING):  aspirin enteric coated 81 milliGRAM(s) Oral daily  budesonide 160 MICROgram(s)/formoterol 4.5 MICROgram(s) Inhaler 2 Puff(s) Inhalation two times a day  busPIRone 10 milliGRAM(s) Oral two times a day  cholecalciferol 2000 Unit(s) Oral daily  cyanocobalamin 1000 MICROGram(s) Oral daily  diltiazem    milliGRAM(s) Oral daily  doxazosin 1 milliGRAM(s) Oral at bedtime  folic acid 1 milliGRAM(s) Oral daily  furosemide   Injectable 40 milliGRAM(s) IV Push two times a day  gabapentin 400 milliGRAM(s) Oral three times a day  heparin   Injectable 5000 Unit(s) SubCutaneous every 8 hours  LORazepam     Tablet   Oral   LORazepam     Tablet 1.5 milliGRAM(s) Oral every 4 hours  LORazepam     Tablet 1 milliGRAM(s) Oral every 4 hours  metoprolol tartrate 25 milliGRAM(s) Oral two times a day  multivitamin 1 Tablet(s) Oral daily  pantoprazole    Tablet 40 milliGRAM(s) Oral before breakfast  QUEtiapine 100 milliGRAM(s) Oral at bedtime  spironolactone 25 milliGRAM(s) Oral daily  thiamine 100 milliGRAM(s) Oral daily  tiotropium 2.5 MICROgram(s) Inhaler 2 Puff(s) Inhalation daily      MEDICATIONS  (PRN):  acetaminophen     Tablet .. 650 milliGRAM(s) Oral every 6 hours PRN Temp greater or equal to 38C (100.4F), Mild Pain (1 - 3)  albuterol    90 MICROgram(s) HFA Inhaler 2 Puff(s) Inhalation every 6 hours PRN Shortness of Breath and/or Wheezing  LORazepam     Tablet 1 milliGRAM(s) Oral every 2 hours PRN CIWA-Ar score increase by 2 points and a total score of 7 or less  LORazepam   Injectable 1 milliGRAM(s) IV Push every 1 hour PRN CIWA-Ar score 8 or greater    Vital Signs Last 24 Hrs  T(C): 36.3 (10 Jul 2023 07:50), Max: 36.9 (10 Jul 2023 04:00)  T(F): 97.3 (10 Jul 2023 07:50), Max: 98.4 (10 Jul 2023 04:00)  HR: 84 (10 Jul 2023 07:50) (75 - 108)  BP: 121/64 (10 Jul 2023 07:50) (113/58 - 146/67)  BP(mean): 79 (10 Jul 2023 00:02) (79 - 79)  RR: 18 (10 Jul 2023 07:50) (18 - 18)  SpO2: 94% (10 Jul 2023 07:50) (91% - 96%)    Parameters below as of 10 Jul 2023 07:50  Patient On (Oxygen Delivery Method): nasal cannula      PHYSICAL EXAM:  Constitutional: NAD, awake and alert  HEENT:  EOMI,  Pupils round, No oral cyanosis.  Pulmonary: Non-labored, breath sounds are clear bilaterally, No wheezing, rales or rhonchi  Cardiovascular: S1 and S2, regular rate and rhythm, no Murmurs, gallops or rubs  Gastrointestinal: Bowel Sounds present, soft, nontender.   Lymph: No peripheral edema. No cervical lymphadenopathy.  Neurological: Alert, no focal deficits  Skin: No rashes.  Psych:  Mood & affect appropriate    LABS: reviewed     CARDIAC MARKERS ( 09 Jul 2023 07:07 )  x     / x     / 24 U/L / x     / x                              11.2   13.84 )-----------( 230      ( 09 Jul 2023 07:07 )             34.0     07-10    135  |  100  |  20  ----------------------------<  113<H>  4.4   |  32<H>  |  0.92    Ca    9.3      10 Jul 2023 06:59  Phos  3.0     07-09  Mg     1.8     07-09    TPro  7.4  /  Alb  2.7<L>  /  TBili  0.4  /  DBili  x   /  AST  8<L>  /  ALT  13  /  AlkPhos  93  07-09    - TroponinI hsT: <-5.72, <-7.54    Ca    9.0        09 Jul 2023 07:07  Ca    8.9        08 Jul 2023 09:51  Phos  3.0       09 Jul 2023 07:07  Mg     1.8       09 Jul 2023 07:07    TPro  7.4    /  Alb  2.7    /  TBili  0.4    /  DBili  x      /  AST  8      /  ALT  13     /  AlkPhos  93     09 Jul 2023 07:07  TPro  7.3    /  Alb  2.8    /  TBili  0.5    /  DBili  x      /  AST  12     /  ALT  13     /  AlkPhos  115    08 Jul 2023 09:51      CARDIAC MARKERS ( 09 Jul 2023 07:07 )  x     / x     / 24 U/L / x     / x            07-09 @ 07:07  TSH: 0.24    Radiology/Echo/EKG: reviewed     TTE on 5/30/23 - copy in pt's chart, not available in [facility]  Echi wit presered LVEF 60%, moderate MR, mild to moderate TR     EKG afib rate 101 with normal axis intervals    < from: Xray Chest 1 View- PORTABLE-Urgent (07.08.23 @ 11:51) >  ACC: [id number] EXAM:  XR CHEST PORTABLE URGENT 1V   ORDERED BY: GADIEL KULKARNI     PROCEDURE DATE:  07/08/2023          INTERPRETATION:  Chest radiograph (one view)     CPT 14406    CLINICAL INFORMATION: Short of breath.    TECHNIQUE:  Single frontalview of the chest was obtained.    FINDINGS:  Prior study dated 6/2/2023 was available for review.    The lungs imaged increased pulmonary vascular congestion. No gross   consolidation is seen. No pleural effusion is seen. The heart is enlarged.      IMPRESSION: Cardiomegaly. Increased pulmonary vascular congestion noted.    < end of copied text >    ----------------------------------------------------------------------------------------------------------------  < from: TTE Echo Complete w/o Contrast w/ Doppler (06.30.21 @ 14:49) >   Impression     Summary     Mild concentric left ventricular hypertrophy is present.   Estimated left ventricular ejection fraction is 60-65 %.   The left atrium is severely dilated.   The right atrium is not well seen.   The right ventricle is not well seen, appears grossly normal.   The aortic valve is trileafletwith thin pliable leaflets.   Fibrocalcific changes noted to the mitral valve leaflets with preserved   leaflet excursion.   Highly eccentric mitral regurgitation is noted.(? severity-study limited))   The tricuspid valve leaflets are thin and pliable; valve motion is normal.   Moderate (2+) tricuspid valve regurgitation is present.   Moderate pulmonary hypertension.   No evidence of pericardial effusion.     Signature     ----------------------------------------------------------------   Electronically signed by Chapito Sim MD(Interpreting   physician) on 06/30/2021 05:43 PM    < end of copied text >  ----------------------------------------------------------------------------------------------------------------------------------------

## 2023-07-10 NOTE — PROGRESS NOTE ADULT - ASSESSMENT
69 y/o man with obesity, HTN, HLD, HFpEF, mod MR, paroxysmal afib s/p Wathcman, VIJAY non adherent to CPAP, COPD, pulmonary HTN, chronic respiratory failure with hypoxia and hypercapnea, on home O2 4L/min, alcohol use disorder with alcohol-induced cirrhosis, hx of DTs, PAD hx of L BKA, recent admission in 5/29-6/5 for Parainfluenza infection associated with COPD exacerbation and probable CHF exacerbation, returns 7/8/23 with progressively worsening dyspnea, likely missed Lasix doses, also slight tremor with tongue fasciculations in the setting of daily alcohol use. Admitted for further management.    Acute on chronic diastolic CHF  In setting of non adherence to medications, admits to skipping furosemide and other medications while drinking alcohol frequently, excessively. Signs and symptoms consistent with fluid overload. Appreciate input from Cardiology  - TTE ordered but cancelled by performing department, will discuss with Cardiology  - GDMT with metoprolol, spironolactone increased to 50mg daily, diuresis with Lasix IV increased to 80mg BID, will consider SGLT2i when on PO diuretics  - Amyloid workup (immunofixation, K/L ratio, PYP study  - Is and Os, daily weight  - Trend lytes and Cr  - Avoid added salt in diet, fluid restrict to 1.5L/day    Permanent atrial fibrillation  HR suboptimally controlled. Was in RVR in ED. Increased Cardizem DC to 180mg daily. Continued beta blocker. Not on AC as he is s/p Watchman. Has hx of GI bleed.   - Continue Cardizem CD and metoprolol    Moderate mitral regurgitation  As per past echo, repeat echo pending  - F/u TTE    Alcohol use disorder with mild withdrawal  Improved. Patient actually does not appear to drink particularly heavily per his statements  - Will try to corroborate patient's alcohol use  - Continue CIWA protocol  - Thiamine, MVI    Alcoholic cirrhosis  No encephalopathy. No ascites. Unknown as to presence of varices. Splenic hypodensity noted on CT.   - Continue diuretics.   - Outpatient follow up advised.    VIJAY  Nonadherent to CPAP  - Ordered for NIPPV tonight and encouraged its use    COPD on home O2  Stable. Appreciate Pulmonary Medicine input  - Continue O2 supplementation  - Continue Symbicort, added Spiriva    Pulmonary nodules in MICHELLE and RLL  As noted on CT  - Repeat CT 6 wks    Chronic hyponatremia  Stable. In setting of CHF and cirrhosis. Mentation at baseline.   - Continue to monitor    Normocytic anemia  Did have an episode of rectal bleeding prior to admission, suspected to be hemorrhoidal. He does have hx of R ischemic colitis in past and underlying cirrhosis / alcohol use disorder that could also be responsible for his chronic anemia. No overt bleeding this admission. Per Dr Barker, can follow up outpatient for possible EGD and colonoscopy.   - Outpatient follow up    Vit D deficiency  25-OH vit D 17 ng/mL.   - Continue Vit D replacement

## 2023-07-10 NOTE — PROGRESS NOTE ADULT - SUBJECTIVE AND OBJECTIVE BOX
Chief Complaint: Shortness of breath, chest congestion    Interval Hx: Patient reports feeling somewhat improved today as compared to admission. Breathing more comfortably, less chest congestion and wet crackles. No dyspnea at rest. Still with dyspnea on mild exertion. No chest pain or tightness. No other acute complaints. Abdomen is obese but soft and without tenderness. No abdominal complaints such as pain, nausea, vomiting, diarrhea, constipation. Abd US showed no ascites. He has edema in RLE. LLE has BKA without edema. CHF team has increased his diuretic regimen. Encouraged patient to use NIPPV overnight.    ROS: Multi system review is comprehensively negative x 10 systems except as above    Vitals:  T(F): 97.9 (10 Jul 2023 19:51), Max: 98.4 (10 Jul 2023 04:00)  HR: 92 (10 Jul 2023 19:51) (75 - 108)  BP: 109/60 (10 Jul 2023 19:51) (109/60 - 121/64)  RR: 18 (10 Jul 2023 07:50) (18 - 18)  SpO2: 96% (10 Jul 2023 19:51) (91% - 96%) on O2 via [place] (on home O2 4L/min)    Exam:  Gen: No acute distress   HEENT: NCAT PERRL EOMI  MMM   clear oropharynx  Neck: Supple  CVS: s1 s2 normal, regular, rate ~90  Chest: Normal resp effort at rest, wet crackles B/L throughout  Abd: Obese, +BS, non distended, soft, non tender  Ext: RLE edema, LLE amputation  Skin: Warm, dry  Mood: Calm, pleasant  Neuro: A+OX3, no gross deficits, no tremulousness, no asterixis    Labs:                  11.2   13.84 )--------( 230             34.0       135  |  100  |  20  -----------------------<  113  4.4   |  32  |  0.92    Ca 9.3     Phos 3.0   Mg 1.8    TPro  7.4  /  Alb  2.7  /  TBili  0.4  /  DBili  x   /  AST  8 /  ALT  13  /  AlkPhos  93      Lactate 2.2   Troponin negative   proBNP 1755      A1c 5.9    TSH 0.24    VBG pH 7.38  pCO2 60  pO2 40  O2 62%    COVID19 PCR negative  Flu PCR negative  RSV PCR negative    Cardiac Testing:  TTE ordered, cancelled by performing department  EKG 7/8: Afib, rate 101, normal axis, normal intervals    Imaging:  CXR 7/8: The lungs imaged increased pulmonary vascular congestion. No gross consolidation is seen. No pleural effusion is seen. The heart is enlarged.    Meds:  MEDICATIONS  (STANDING):  aspirin enteric coated 81 milliGRAM(s) Oral daily  budesonide 160 MICROgram(s)/formoterol 4.5 MICROgram(s) Inhaler 2 Puff(s) Inhalation two times a day  busPIRone 10 milliGRAM(s) Oral two times a day  cyanocobalamin 1000 MICROGram(s) Oral daily  diltiazem    milliGRAM(s) Oral daily  doxazosin 1 milliGRAM(s) Oral at bedtime  folic acid 1 milliGRAM(s) Oral daily  furosemide   Injectable 40 milliGRAM(s) IV Push two times a day  gabapentin 400 milliGRAM(s) Oral three times a day  heparin   Injectable 5000 Unit(s) SubCutaneous every 8 hours  LORazepam     Tablet 1.5 milliGRAM(s) Oral every 4 hours  metoprolol tartrate 25 milliGRAM(s) Oral two times a day  multivitamin 1 Tablet(s) Oral daily  pantoprazole    Tablet 40 milliGRAM(s) Oral before breakfast  QUEtiapine 100 milliGRAM(s) Oral at bedtime  spironolactone 25 milliGRAM(s) Oral daily  thiamine 100 milliGRAM(s) Oral daily  tiotropium 2.5 MICROgram(s) Inhaler 2 Puff(s) Inhalation daily    MEDICATIONS  (PRN):  acetaminophen     Tablet .. 650 milliGRAM(s) Oral every 6 hours PRN Temp greater or equal to 38C (100.4F), Mild Pain (1 - 3)  albuterol    90 MICROgram(s) HFA Inhaler 2 Puff(s) Inhalation every 6 hours PRN Shortness of Breath and/or Wheezing  LORazepam     Tablet 1 milliGRAM(s) Oral every 2 hours PRN CIWA-Ar score increase by 2 points and a total score of 7 or less  LORazepam   Injectable 1 milliGRAM(s) IV Push every 1 hour PRN CIWA-Ar score 8 or greater

## 2023-07-10 NOTE — CONSULT NOTE ADULT - SUBJECTIVE AND OBJECTIVE BOX
HPI:  69 y/o male FpEF with a hx of HTN, pHTN paroxysmal atrial fibrillation s/p Watchman, mod MR, PAD s/p L BKA,  COPD (on 4 L of home O2),  ETOH induced cirrhosis, trach, who presents to  on  with 1 day history of difficulty breathing, productive cough, wheezing. Recent admission for COPD exacerbation with CHF, and has been having sob at home the past 1 day. He is in assisted living and drinks daily. He reprots missing medications at times to include "a few" doses of lasix over the past few weeks. He has SOB with orthopnea. He also had significant improvement with IV lasix in ED.     He was last admitted to        EKG afib rate 101 with normal axis intervals   Chronic atrial fibrillation  Alcohol abuse  Poor historian  CHF (congestive heart failure)  Cirrhosis  Emphysema of lung  Alcohol withdrawal  Collapsed lung  Meningitis  Falls  Anemia  Chronic obstructive pulmonary disease (COPD)  GERD (gastroesophageal reflux disease)  Hypertension  Presence of Watchman left atrial appendage closure device  Atrial fibrillation  COPD without exacerbation  Chronic CHF    S/P BKA (below knee amputation) unilateral, left  Presence of Watchman left atrial appendage closure device  Unilateral amputation of lower extremity below knee  H/O tracheostomy  S/P percutaneous endoscopic gastrostomy (PEG) tube placement      	  Vitals:  T(C): 36.7 (23 @ 15:20), Max: 36.9 (23 @ 09:30)  HR: 100 (23 @ 15:20) (58 - 100)  BP: 109/55 (23 @ 15:20) (109/55 - 128/93)  RR: 20 (23 @ 15:20) (13 - 20)  SpO2: 96% (23 @ 15:20) (94% - 98%)  Wt(kg): --  I&O's Summary    Weight (kg): 133.4 ( @ 09:30)    PHYSICAL EXAM:  Appearance: Comfortable. No acute distress  HEENT:  Head and neck: Atraumatic. Normocephalic.  Normal oral mucosa, PERRL, Neck is supple. No JVD, No carotid bruit.   Neurologic: A & O x 3, no focal deficits. EOMI , Cranial nerves are intact.  Lymphatic: No cervical lymphadenopathy  Cardiovascular: Normal S1 S2, irregularly irregular rhythm, No murmur, rubs/gallops. + JVD, trace edema with L BKA  Respiratory: Lungs with b/l rhonchi and bibasilar crackles  Gastrointestinal:  Soft, Non-tender, + BS, protuberant.  Psychiatry: Patient is calm. No agitation. Mood & affect appropriate  Skin: No rashes/ ecchymoses/cyanosis/ulcers visualized on the face, hands or feet however multiple abrasions and healing ulcers on R lower leg.    CURRENT MEDICATIONS:  diltiazem    milliGRAM(s) Oral daily  doxazosin 1 milliGRAM(s) Oral at bedtime  furosemide   Injectable 40 milliGRAM(s) IV Push two times a day  metoprolol tartrate 25 milliGRAM(s) Oral two times a day  spironolactone 25 milliGRAM(s) Oral daily    budesonide 160 MICROgram(s)/formoterol 4.5 MICROgram(s) Inhaler  cyanocobalamin 1000 Oral  folic acid 1 Oral  multivitamin 1 Oral  thiamine 100 Oral  busPIRone  gabapentin  LORazepam     Tablet  LORazepam     Tablet  QUEtiapine  pantoprazole    Tablet  aspirin enteric coated  cyanocobalamin  folic acid  multivitamin  thiamine        REVIEW OF SYSTEMS:  14 point ROS negative in detail apart from as documented in HPI.    MEDICATIONS  (STANDING):  aspirin enteric coated 81 milliGRAM(s) Oral daily  budesonide 160 MICROgram(s)/formoterol 4.5 MICROgram(s) Inhaler 2 Puff(s) Inhalation two times a day  busPIRone 10 milliGRAM(s) Oral two times a day  cholecalciferol 2000 Unit(s) Oral daily  cyanocobalamin 1000 MICROGram(s) Oral daily  diltiazem    milliGRAM(s) Oral daily  doxazosin 1 milliGRAM(s) Oral at bedtime  folic acid 1 milliGRAM(s) Oral daily  furosemide   Injectable 40 milliGRAM(s) IV Push two times a day  gabapentin 400 milliGRAM(s) Oral three times a day  heparin   Injectable 5000 Unit(s) SubCutaneous every 8 hours  LORazepam     Tablet 1.5 milliGRAM(s) Oral every 4 hours  LORazepam     Tablet 1 milliGRAM(s) Oral every 4 hours  LORazepam     Tablet   Oral   metoprolol tartrate 25 milliGRAM(s) Oral two times a day  multivitamin 1 Tablet(s) Oral daily  pantoprazole    Tablet 40 milliGRAM(s) Oral before breakfast  QUEtiapine 100 milliGRAM(s) Oral at bedtime  spironolactone 25 milliGRAM(s) Oral daily  thiamine 100 milliGRAM(s) Oral daily  tiotropium 2.5 MICROgram(s) Inhaler 2 Puff(s) Inhalation daily    MEDICATIONS  (PRN):  acetaminophen     Tablet .. 650 milliGRAM(s) Oral every 6 hours PRN Temp greater or equal to 38C (100.4F), Mild Pain (1 - 3)  albuterol    90 MICROgram(s) HFA Inhaler 2 Puff(s) Inhalation every 6 hours PRN Shortness of Breath and/or Wheezing  LORazepam     Tablet 1 milliGRAM(s) Oral every 2 hours PRN CIWA-Ar score increase by 2 points and a total score of 7 or less  LORazepam   Injectable 1 milliGRAM(s) IV Push every 1 hour PRN CIWA-Ar score 8 or greater    Home Medications:  albuterol 90 mcg/inh inhalation aerosol: 2 puff(s) inhaled every 4 hours, As Needed for wheezing (2023 16:25)  Aspirin Enteric Coated 81 mg oral delayed release tablet: 1 tab(s) orally once a day (2023 16:25)  budesonide-formoterol 160 mcg-4.5 mcg/inh inhalation aerosol: 2 puff(s) inhaled 2 times a day (2023 16:25)  busPIRone 10 mg oral tablet: 1 tab(s) orally 2 times a day (2023 16:25)  cholecalciferol 25 mcg (1000 intl units) oral tablet: 1 tab(s) orally once a day (2023 16:28)  DilTIAZem (Eqv-Cardizem CD) 120 mg/24 hours oral capsule, extended release: 1 cap(s) orally once a day (2023 16:28)  doxazosin 2 mg oral tablet: 1 tab(s) orally once a day (2023 16:28)  furosemide 80 mg oral tablet: 1 tab(s) orally 2 times a day (2023 16:28)  gabapentin 400 mg oral capsule: 1 cap(s) orally 3 times a day (2023 16:25)  Metoprolol Tartrate 25 mg oral tablet: 1 tab(s) orally 2 times a day (2023 16:25)  omeprazole 40 mg oral delayed release capsule: 1 cap(s) orally once a day (2023 16:25)  QUEtiapine 50 mg oral tablet: 2 tab(s) orally once a day (at bedtime) (2023 16:25)  spironolactone 25 mg oral tablet: 1 tab(s) orally once a day (2023 16:25)    Allergies    Ceclor (Rash)  Duricef (Rash)    Intolerances      Social History:    FAMILY HISTORY:  No pertinent family history in first degree relatives        ICU Vital Signs Last 24 Hrs  T(C): 36.3, Max: 36.9 (07-10 @ 04:00)  HR: 84 (75 - 108)  BP: 121/64 (113/58 - 146/67)  BP(mean): 79 (79 - 79)  ABP: --  ABP(mean): --  RR: 18 (18 - 18)  SpO2: 94% (91% - 96%)    Weight in k.8 (07-10-23)  Weight in k.4 (23)      I&O's Summary Last 24 Hrs    Tele: Sinus/AFib 80s-100s    Physical Exam:    General: No distress. Comfortable.  HEENT: EOM intact.  Neck: Neck supple. JVP not elevated. No masses  Chest: Clear to auscultation bilaterally  CV: Normal S1 and S2. No murmurs, rub, or gallops. Radial pulses normal.  Abdomen: Soft, non-distended, non-tender  Skin: No rashes or skin breakdown  Neurology: Alert and oriented times three. Sensation intact  Psych: Affect normal    Labs:    ( 23 @ 07:07 )               11.2   13.84 )--------( 230                  34.0     ( 07-10-23 @ 06:59 )     135  |  100  |  20  ---------------------<  113  4.4  |  32  |  0.92    Ca 9.3  Phos x   Mg x     ( 23 @ 07:07 )  TPro  7.4  /  Alb  2.7  /  TBili  0.4  /  DBili  x   /  AST  8   /  ALT  13  /  AlkPhos  93  ( 23 @ 09:51 )  TPro  7.3  /  Alb  2.8  /  TBili  0.5  /  DBili  x   /  AST  12  /  ALT  13  /  AlkPhos  115      ( 23 @ 07:07 )  TropHS x     / CK 24    / CKMB x            VBG - ( 07-10-23 @ 12:14 )  pH: 7.34  / pCO2: 71    / pO2: 50    / Oxygen saturation: 78                RADIOLOGY & ADDITIONAL STUDIES: HPI:  69 y/o male FpEF with a hx of HTN, pHTN paroxysmal atrial fibrillation s/p Watchman, mod MR, PAD s/p L BKA,  COPD (on 4 L of home O2),  ETOH induced cirrhosis, trach, who presents to  on  with 1 day history of difficulty breathing, productive cough, wheezing. Recent admission for COPD exacerbation with CHF, and has been having sob at home the past 1 day. He is in assisted living and drinks daily. He reprots missing medications at times to include "a few" doses of lasix over the past few weeks. He has SOB with orthopnea. He also had significant improvement with IV lasix in ED.     He was last admitted to  in  for similar symptoms       EKG afib rate 101 with normal axis intervals   Chronic atrial fibrillation  Alcohol abuse  Poor historian  CHF (congestive heart failure)  Cirrhosis  Emphysema of lung  Alcohol withdrawal  Collapsed lung  Meningitis  Falls  Anemia  Chronic obstructive pulmonary disease (COPD)  GERD (gastroesophageal reflux disease)  Hypertension  Presence of Watchman left atrial appendage closure device  Atrial fibrillation  COPD without exacerbation  Chronic CHF    S/P BKA (below knee amputation) unilateral, left  Presence of Watchman left atrial appendage closure device  Unilateral amputation of lower extremity below knee  H/O tracheostomy  S/P percutaneous endoscopic gastrostomy (PEG) tube placement      	  Vitals:  T(C): 36.7 (23 @ 15:20), Max: 36.9 (23 @ 09:30)  HR: 100 (23 @ 15:20) (58 - 100)  BP: 109/55 (23 @ 15:20) (109/55 - 128/93)  RR: 20 (23 @ 15:20) (13 - 20)  SpO2: 96% (23 @ 15:20) (94% - 98%)  Wt(kg): --  I&O's Summary    Weight (kg): 133.4 ( @ 09:30)    PHYSICAL EXAM:  Appearance: Comfortable. No acute distress  HEENT:  Head and neck: Atraumatic. Normocephalic.  Normal oral mucosa, PERRL, Neck is supple. No JVD, No carotid bruit.   Neurologic: A & O x 3, no focal deficits. EOMI , Cranial nerves are intact.  Lymphatic: No cervical lymphadenopathy  Cardiovascular: Normal S1 S2, irregularly irregular rhythm, No murmur, rubs/gallops. + JVD, trace edema with L BKA  Respiratory: Lungs with b/l rhonchi and bibasilar crackles  Gastrointestinal:  Soft, Non-tender, + BS, protuberant.  Psychiatry: Patient is calm. No agitation. Mood & affect appropriate  Skin: No rashes/ ecchymoses/cyanosis/ulcers visualized on the face, hands or feet however multiple abrasions and healing ulcers on R lower leg.    CURRENT MEDICATIONS:  diltiazem    milliGRAM(s) Oral daily  doxazosin 1 milliGRAM(s) Oral at bedtime  furosemide   Injectable 40 milliGRAM(s) IV Push two times a day  metoprolol tartrate 25 milliGRAM(s) Oral two times a day  spironolactone 25 milliGRAM(s) Oral daily    budesonide 160 MICROgram(s)/formoterol 4.5 MICROgram(s) Inhaler  cyanocobalamin 1000 Oral  folic acid 1 Oral  multivitamin 1 Oral  thiamine 100 Oral  busPIRone  gabapentin  LORazepam     Tablet  LORazepam     Tablet  QUEtiapine  pantoprazole    Tablet  aspirin enteric coated  cyanocobalamin  folic acid  multivitamin  thiamine        REVIEW OF SYSTEMS:  14 point ROS negative in detail apart from as documented in HPI.    MEDICATIONS  (STANDING):  aspirin enteric coated 81 milliGRAM(s) Oral daily  budesonide 160 MICROgram(s)/formoterol 4.5 MICROgram(s) Inhaler 2 Puff(s) Inhalation two times a day  busPIRone 10 milliGRAM(s) Oral two times a day  cholecalciferol 2000 Unit(s) Oral daily  cyanocobalamin 1000 MICROGram(s) Oral daily  diltiazem    milliGRAM(s) Oral daily  doxazosin 1 milliGRAM(s) Oral at bedtime  folic acid 1 milliGRAM(s) Oral daily  furosemide   Injectable 40 milliGRAM(s) IV Push two times a day  gabapentin 400 milliGRAM(s) Oral three times a day  heparin   Injectable 5000 Unit(s) SubCutaneous every 8 hours  LORazepam     Tablet 1.5 milliGRAM(s) Oral every 4 hours  LORazepam     Tablet 1 milliGRAM(s) Oral every 4 hours  LORazepam     Tablet   Oral   metoprolol tartrate 25 milliGRAM(s) Oral two times a day  multivitamin 1 Tablet(s) Oral daily  pantoprazole    Tablet 40 milliGRAM(s) Oral before breakfast  QUEtiapine 100 milliGRAM(s) Oral at bedtime  spironolactone 25 milliGRAM(s) Oral daily  thiamine 100 milliGRAM(s) Oral daily  tiotropium 2.5 MICROgram(s) Inhaler 2 Puff(s) Inhalation daily    MEDICATIONS  (PRN):  acetaminophen     Tablet .. 650 milliGRAM(s) Oral every 6 hours PRN Temp greater or equal to 38C (100.4F), Mild Pain (1 - 3)  albuterol    90 MICROgram(s) HFA Inhaler 2 Puff(s) Inhalation every 6 hours PRN Shortness of Breath and/or Wheezing  LORazepam     Tablet 1 milliGRAM(s) Oral every 2 hours PRN CIWA-Ar score increase by 2 points and a total score of 7 or less  LORazepam   Injectable 1 milliGRAM(s) IV Push every 1 hour PRN CIWA-Ar score 8 or greater    Home Medications:  albuterol 90 mcg/inh inhalation aerosol: 2 puff(s) inhaled every 4 hours, As Needed for wheezing (2023 16:25)  Aspirin Enteric Coated 81 mg oral delayed release tablet: 1 tab(s) orally once a day (2023 16:25)  budesonide-formoterol 160 mcg-4.5 mcg/inh inhalation aerosol: 2 puff(s) inhaled 2 times a day (2023 16:25)  busPIRone 10 mg oral tablet: 1 tab(s) orally 2 times a day (2023 16:25)  cholecalciferol 25 mcg (1000 intl units) oral tablet: 1 tab(s) orally once a day (2023 16:28)  DilTIAZem (Eqv-Cardizem CD) 120 mg/24 hours oral capsule, extended release: 1 cap(s) orally once a day (2023 16:28)  doxazosin 2 mg oral tablet: 1 tab(s) orally once a day (2023 16:28)  furosemide 80 mg oral tablet: 1 tab(s) orally 2 times a day (2023 16:28)  gabapentin 400 mg oral capsule: 1 cap(s) orally 3 times a day (2023 16:25)  Metoprolol Tartrate 25 mg oral tablet: 1 tab(s) orally 2 times a day (2023 16:25)  omeprazole 40 mg oral delayed release capsule: 1 cap(s) orally once a day (2023 16:25)  QUEtiapine 50 mg oral tablet: 2 tab(s) orally once a day (at bedtime) (2023 16:25)  spironolactone 25 mg oral tablet: 1 tab(s) orally once a day (2023 16:25)    Allergies    Ceclor (Rash)  Duricef (Rash)    Intolerances      Social History:    FAMILY HISTORY:  No pertinent family history in first degree relatives        ICU Vital Signs Last 24 Hrs  T(C): 36.3, Max: 36.9 (07-10 @ 04:00)  HR: 84 (75 - 108)  BP: 121/64 (113/58 - 146/67)  BP(mean): 79 (79 - 79)  ABP: --  ABP(mean): --  RR: 18 (18 - 18)  SpO2: 94% (91% - 96%)    Weight in k.8 (07-10-23)  Weight in k.4 (23)      I&O's Summary Last 24 Hrs    Tele: Sinus/AFib 80s-100s    Physical Exam:    General: No distress. Comfortable.  HEENT: EOM intact.  Neck: Neck supple. JVP moderately elevated. No masses  Chest: Clear to auscultation bilaterally  CV: Normal S1 and S2. No murmurs, rub, or gallops. Radial pulses normal.  Abdomen: Soft, non-distended, non-tender  Skin: No rashes or skin breakdown  Extremities:  Warm, 1+ LE edema   Neurology: Alert and oriented times three. Sensation intact  Psych: Affect normal    Labs:    ( 23 @ 07:07 )               11.2   13.84 )--------( 230                  34.0     ( 07-10-23 @ 06:59 )     135  |  100  |  20  ---------------------<  113  4.4  |  32  |  0.92    Ca 9.3  Phos x   Mg x     ( 23 @ 07:07 )  TPro  7.4  /  Alb  2.7  /  TBili  0.4  /  DBili  x   /  AST  8   /  ALT  13  /  AlkPhos  93  ( 23 @ 09:51 )  TPro  7.3  /  Alb  2.8  /  TBili  0.5  /  DBili  x   /  AST  12  /  ALT  13  /  AlkPhos  115      ( 23 @ 07:07 )  TropHS x     / CK 24    / CKMB x            VBG - ( 07-10-23 @ 12:14 )  pH: 7.34  / pCO2: 71    / pO2: 50    / Oxygen saturation: 78                RADIOLOGY & ADDITIONAL STUDIES:

## 2023-07-11 LAB
ADD ON TEST-SPECIMEN IN LAB: SIGNIFICANT CHANGE UP
AMMONIA BLD-MCNC: 19 UMOL/L — SIGNIFICANT CHANGE UP (ref 11–32)
ANION GAP SERPL CALC-SCNC: 3 MMOL/L — LOW (ref 5–17)
BASE EXCESS BLDA CALC-SCNC: 6.4 MMOL/L — HIGH (ref -2–3)
BUN SERPL-MCNC: 22 MG/DL — SIGNIFICANT CHANGE UP (ref 7–23)
CALCIUM SERPL-MCNC: 8.9 MG/DL — SIGNIFICANT CHANGE UP (ref 8.5–10.1)
CHLORIDE SERPL-SCNC: 100 MMOL/L — SIGNIFICANT CHANGE UP (ref 96–108)
CO2 SERPL-SCNC: 34 MMOL/L — HIGH (ref 22–31)
CREAT SERPL-MCNC: 0.88 MG/DL — SIGNIFICANT CHANGE UP (ref 0.5–1.3)
EGFR: 95 ML/MIN/1.73M2 — SIGNIFICANT CHANGE UP
GAS PNL BLDA: SIGNIFICANT CHANGE UP
GLUCOSE BLDC GLUCOMTR-MCNC: 113 MG/DL — HIGH (ref 70–99)
GLUCOSE SERPL-MCNC: 126 MG/DL — HIGH (ref 70–99)
HCO3 BLDA-SCNC: 34 MMOL/L — HIGH (ref 21–28)
KAPPA LC SER QL IFE: 2.83 MG/DL — HIGH (ref 0.33–1.94)
KAPPA/LAMBDA FREE LIGHT CHAIN RATIO, SERUM: 1.68 RATIO — HIGH (ref 0.26–1.65)
LAMBDA LC SER QL IFE: 1.68 MG/DL — SIGNIFICANT CHANGE UP (ref 0.57–2.63)
PCO2 BLDA: 58 MMHG — HIGH (ref 35–48)
PH BLDA: 7.37 — SIGNIFICANT CHANGE UP (ref 7.35–7.45)
PO2 BLDA: 69 MMHG — LOW (ref 83–108)
POTASSIUM SERPL-MCNC: 4.2 MMOL/L — SIGNIFICANT CHANGE UP (ref 3.5–5.3)
POTASSIUM SERPL-SCNC: 4.2 MMOL/L — SIGNIFICANT CHANGE UP (ref 3.5–5.3)
SAO2 % BLDA: 95 % — SIGNIFICANT CHANGE UP (ref 94–98)
SODIUM SERPL-SCNC: 137 MMOL/L — SIGNIFICANT CHANGE UP (ref 135–145)

## 2023-07-11 PROCEDURE — 99233 SBSQ HOSP IP/OBS HIGH 50: CPT

## 2023-07-11 RX ORDER — ENOXAPARIN SODIUM 100 MG/ML
40 INJECTION SUBCUTANEOUS EVERY 24 HOURS
Refills: 0 | Status: DISCONTINUED | OUTPATIENT
Start: 2023-07-12 | End: 2023-07-14

## 2023-07-11 RX ADMIN — Medication 80 MILLIGRAM(S): at 09:02

## 2023-07-11 RX ADMIN — PANTOPRAZOLE SODIUM 40 MILLIGRAM(S): 20 TABLET, DELAYED RELEASE ORAL at 09:01

## 2023-07-11 RX ADMIN — GABAPENTIN 400 MILLIGRAM(S): 400 CAPSULE ORAL at 13:51

## 2023-07-11 RX ADMIN — ALBUTEROL 2 PUFF(S): 90 AEROSOL, METERED ORAL at 10:25

## 2023-07-11 RX ADMIN — PREGABALIN 1000 MICROGRAM(S): 225 CAPSULE ORAL at 09:01

## 2023-07-11 RX ADMIN — HEPARIN SODIUM 5000 UNIT(S): 5000 INJECTION INTRAVENOUS; SUBCUTANEOUS at 06:22

## 2023-07-11 RX ADMIN — Medication 1 MILLIGRAM(S): at 23:21

## 2023-07-11 RX ADMIN — Medication 25 MILLIGRAM(S): at 23:20

## 2023-07-11 RX ADMIN — Medication 10 MILLIGRAM(S): at 23:19

## 2023-07-11 RX ADMIN — GABAPENTIN 400 MILLIGRAM(S): 400 CAPSULE ORAL at 09:00

## 2023-07-11 RX ADMIN — Medication 25 MILLIGRAM(S): at 09:00

## 2023-07-11 RX ADMIN — Medication 2000 UNIT(S): at 09:00

## 2023-07-11 RX ADMIN — Medication 650 MILLIGRAM(S): at 14:25

## 2023-07-11 RX ADMIN — Medication 650 MILLIGRAM(S): at 13:56

## 2023-07-11 RX ADMIN — Medication 1 MILLIGRAM(S): at 09:01

## 2023-07-11 RX ADMIN — Medication 180 MILLIGRAM(S): at 09:01

## 2023-07-11 RX ADMIN — BUDESONIDE AND FORMOTEROL FUMARATE DIHYDRATE 2 PUFF(S): 160; 4.5 AEROSOL RESPIRATORY (INHALATION) at 10:24

## 2023-07-11 RX ADMIN — Medication 100 MILLIGRAM(S): at 09:01

## 2023-07-11 RX ADMIN — Medication 1 MILLIGRAM(S): at 09:07

## 2023-07-11 RX ADMIN — Medication 1 TABLET(S): at 09:01

## 2023-07-11 RX ADMIN — Medication 80 MILLIGRAM(S): at 13:56

## 2023-07-11 RX ADMIN — Medication 81 MILLIGRAM(S): at 09:00

## 2023-07-11 RX ADMIN — GABAPENTIN 400 MILLIGRAM(S): 400 CAPSULE ORAL at 23:20

## 2023-07-11 RX ADMIN — TIOTROPIUM BROMIDE 2 PUFF(S): 18 CAPSULE ORAL; RESPIRATORY (INHALATION) at 10:23

## 2023-07-11 RX ADMIN — Medication 10 MILLIGRAM(S): at 09:01

## 2023-07-11 RX ADMIN — SPIRONOLACTONE 50 MILLIGRAM(S): 25 TABLET, FILM COATED ORAL at 09:01

## 2023-07-11 RX ADMIN — BUDESONIDE AND FORMOTEROL FUMARATE DIHYDRATE 2 PUFF(S): 160; 4.5 AEROSOL RESPIRATORY (INHALATION) at 20:35

## 2023-07-11 RX ADMIN — HEPARIN SODIUM 5000 UNIT(S): 5000 INJECTION INTRAVENOUS; SUBCUTANEOUS at 13:51

## 2023-07-11 RX ADMIN — QUETIAPINE FUMARATE 100 MILLIGRAM(S): 200 TABLET, FILM COATED ORAL at 23:20

## 2023-07-11 NOTE — PROGRESS NOTE ADULT - SUBJECTIVE AND OBJECTIVE BOX
Chief Complaint: Shortness of breath, chest congestion    Interval Hx: Patient reports feeling somewhat improved today as compared to admission. Breathing more comfortably, less chest congestion and wet crackles. No dyspnea at rest. Still with dyspnea on mild exertion. No chest pain or tightness. No other acute complaints. Abdomen is obese but soft and without tenderness. No abdominal complaints such as pain, nausea, vomiting, diarrhea, constipation. Abd US showed no ascites. He has edema in RLE. LLE has BKA without edema. CHF Team advised to continue diuretics as we are doing today. Encouraged patient to use NIPPV overnight. Encouraged alcohol cessation. No signs of withdrawal at this time. DC'd standing Ativan taper and will instead monitor on CIWA prn protocol.     ROS: Multi system review is comprehensively negative x 10 systems except as above    Vitals:  T(F): 97.9 (10 Jul 2023 19:51), Max: 97.9 (10 Jul 2023 19:51)  HR: 97 (11 Jul 2023 13:45) (89 - 100)  BP: 117/65 (11 Jul 2023 13:45) (104/69 - 128/80)  RR: 20 (11 Jul 2023 08:55) (18 - 20)  SpO2: 99% (11 Jul 2023 08:55) (96% - 100%) on 4L/min via NC    Exam:  Gen: No acute distress   HEENT: NCAT PERRL EOMI  MMM   clear oropharynx  Neck: Supple  CVS: s1 s2 normal, regular, rate ~90  Chest: Decreased resp effort / chest wall excursion, still with rales in bases, L > R, overall diminished breath sounds  Abd: Obese, +BS, non distended, soft, non tender  Ext: RLE edema, LLE amputation  Skin: Warm, dry. Hyperpigmentation of the RLE. Crusted over old wounds on RLE.   Mood: Calm, pleasant  Neuro: A+OX3, no gross deficits, no tremulousness, no asterixis    Labs:                  11.2   13.84 )--------( 230             34.0       135  |  100  |  20  -----------------------<  113  4.4   |  32  |  0.92    Ca 9.3     Phos 3.0   Mg 1.8    TPro  7.4  /  Alb  2.7  /  TBili  0.4  /  DBili  x   /  AST  8 /  ALT  13  /  AlkPhos  93      Lactate 2.2   Troponin negative   proBNP 1755      A1c 5.9    TSH 0.24    Ammonia 19    Kappa/Lambda free LC ratio 1.68    ABG 7/11 pH 7.37  pCO2 58  pO2 69  O2 95%  VBG 7/10 pH 7.38  pCO2 60  pO2 40  O2 62%    Micro:  COVID19 PCR negative  Flu PCR negative  RSV PCR negative    Cardiac Testing:  PYP study requested but patient unable to tolerate as having trouble laying flay  TTE ordered, cancelled by performing department  EKG 7/8: Afib, rate 101, normal axis, normal intervals    Prior visit cardiac testing  TTE 5/30/23:  EF 60-65%, LVEDd 6.16 IVS 1.33 PWd 1.32 LA Moderately dilated, moderate MR, mild to moderate TR, RSVP 33.     Imaging:  CXR 7/8: The lungs imaged increased pulmonary vascular congestion. No gross consolidation is seen. No pleural effusion is seen. The heart is enlarged.    Meds:  MEDICATIONS  (STANDING):  aspirin enteric coated 81 milliGRAM(s) Oral daily  budesonide 160 MICROgram(s)/formoterol 4.5 MICROgram(s) Inhaler 2 Puff(s) Inhalation two times a day  busPIRone 10 milliGRAM(s) Oral two times a day  cholecalciferol 2000 Unit(s) Oral daily  cyanocobalamin 1000 MICROGram(s) Oral daily  diltiazem    milliGRAM(s) Oral daily  doxazosin 1 milliGRAM(s) Oral at bedtime  folic acid 1 milliGRAM(s) Oral daily  furosemide   Injectable 80 milliGRAM(s) IV Push two times a day  gabapentin 400 milliGRAM(s) Oral three times a day  heparin   Injectable 5000 Unit(s) SubCutaneous every 8 hours  metoprolol tartrate 25 milliGRAM(s) Oral two times a day  multivitamin 1 Tablet(s) Oral daily  pantoprazole    Tablet 40 milliGRAM(s) Oral before breakfast  QUEtiapine 100 milliGRAM(s) Oral at bedtime  spironolactone 50 milliGRAM(s) Oral daily  tiotropium 2.5 MICROgram(s) Inhaler 2 Puff(s) Inhalation daily    MEDICATIONS  (PRN):  acetaminophen     Tablet .. 650 milliGRAM(s) Oral every 6 hours PRN Temp greater or equal to 38C (100.4F), Mild Pain (1 - 3)  albuterol    90 MICROgram(s) HFA Inhaler 2 Puff(s) Inhalation every 6 hours PRN Shortness of Breath and/or Wheezing  LORazepam     Tablet 1 milliGRAM(s) Oral every 2 hours PRN CIWA-Ar score increase by 2 points and a total score of 7 or less  LORazepam   Injectable 1 milliGRAM(s) IV Push every 1 hour PRN CIWA-Ar score 8 or greater

## 2023-07-11 NOTE — PROGRESS NOTE ADULT - SUBJECTIVE AND OBJECTIVE BOX
CHIEF COMPLAINT: Patient is a 68y old  Male who presents with a chief complaint of chf exacerbation with etoh withdrawal (2023 17:52)      HPI:  67 y/o male with PMH of HFpEF, paroxysmal atrial fibrillation s/p Watchman, mod MR, COPD on 4 L of home O2,  ETOH induced cirrhosis, Hx of DT s/p trach now decannulated, HTN, obesity, chronic resp failure on home O2 4L, VIJAY (noncompliant w/ CPAP), alcoholism, severe pulmonary HTN, Left BKA, PAD, presents to  with 1 day history of difficulty breathing, productive cough, wheezing. Recent admission for COPD exacerbation with CHF, and has been having sob at home the past 1 day. He is in assisted living and drinks daily. He misses medications at times, and reports he "may have" missed lasix, which he was on 60mg TID up until recently. He has SOB with orthopnea. He also had significant improvement with IV lasix in ED. At time of evaluation he is pleasant and has a slight tremor with tongue fasciculations which his family says he does not do. ROS neg for CP, n/v/d ha vision changes, or foot pain.    Cardiology consulted for CHF. Pt. was recently admitted between -23 for decompensated HFpEF. Now with increasing dyspnea in the setting of non-compliance with meds - admits to skipping lasix and other meds - and ETOH abuse - admits to recent alcohol use.     7/10/23: feels better, Tele: NSR  23: Sitting up eating breakfast.  Denies cp.  SOB improving.  +productive cough.  Tele: afib 70's-80's    MEDICATIONS  (STANDING):  aspirin enteric coated 81 milliGRAM(s) Oral daily  budesonide 160 MICROgram(s)/formoterol 4.5 MICROgram(s) Inhaler 2 Puff(s) Inhalation two times a day  busPIRone 10 milliGRAM(s) Oral two times a day  cholecalciferol 2000 Unit(s) Oral daily  cyanocobalamin 1000 MICROGram(s) Oral daily  diltiazem    milliGRAM(s) Oral daily  doxazosin 1 milliGRAM(s) Oral at bedtime  folic acid 1 milliGRAM(s) Oral daily  furosemide   Injectable 80 milliGRAM(s) IV Push two times a day  gabapentin 400 milliGRAM(s) Oral three times a day  heparin   Injectable 5000 Unit(s) SubCutaneous every 8 hours  metoprolol tartrate 25 milliGRAM(s) Oral two times a day  multivitamin 1 Tablet(s) Oral daily  pantoprazole    Tablet 40 milliGRAM(s) Oral before breakfast  QUEtiapine 100 milliGRAM(s) Oral at bedtime  spironolactone 50 milliGRAM(s) Oral daily  tiotropium 2.5 MICROgram(s) Inhaler 2 Puff(s) Inhalation daily    MEDICATIONS  (PRN):  acetaminophen     Tablet .. 650 milliGRAM(s) Oral every 6 hours PRN Temp greater or equal to 38C (100.4F), Mild Pain (1 - 3)  albuterol    90 MICROgram(s) HFA Inhaler 2 Puff(s) Inhalation every 6 hours PRN Shortness of Breath and/or Wheezing  LORazepam     Tablet 1 milliGRAM(s) Oral every 2 hours PRN CIWA-Ar score increase by 2 points and a total score of 7 or less  LORazepam   Injectable 1 milliGRAM(s) IV Push every 1 hour PRN CIWA-Ar score 8 or greater    Vital Signs Last 24 Hrs  T(C): 36.6 (10 Jul 2023 19:51), Max: 36.6 (10 Jul 2023 19:51)  T(F): 97.9 (10 Jul 2023 19:51), Max: 97.9 (10 Jul 2023 19:51)  HR: 100 (2023 08:55) (89 - 100)  BP: 128/80 (2023 08:55) (104/69 - 128/80)  BP(mean): 90 (2023 08:55) (71 - 90)  RR: 20 (2023 08:55) (18 - 20)  SpO2: 99% (2023 08:55) (96% - 100%)    Parameters below as of 2023 08:55  Patient On (Oxygen Delivery Method): nasal cannula  O2 Flow (L/min): 4    Daily     Daily Weight in k.5 (2023 07:38)    PHYSICAL EXAM:  Constitutional: NAD, awake and alert  HEENT:  EOMI,  Pupils round, No oral cyanosis.  Pulmonary: Non-labored, breath sounds are clear bilaterally, No wheezing, rales or rhonchi  Cardiovascular: S1 and S2, regular rate and irreg rhythm, no Murmurs, gallops or rubs  Gastrointestinal: Bowel Sounds present, soft, nontender.   Ext: trace-1+ RLE edema; LBKA.  Neurological: Alert, no focal deficits  Skin: No rashes.  Psych:  Mood & affect appropriate    LABS: reviewed     CARDIAC MARKERS ( 2023 07:07 )  x     / x     / 24 U/L / x     / x                                11.2   13.84 )-----------( 230      ( 2023 07:07 )             34.0     07-11    137  |  100  |  22  ----------------------------<  126<H>  4.2   |  34<H>  |  0.88    Ca    8.9      2023 06:13        07-10    135  |  100  |  20  ----------------------------<  113<H>  4.4   |  32<H>  |  0.92    Ca    9.3      10 Jul 2023 06:59  Phos  3.0     07-09  Mg     1.8     07-09    TPro  7.4  /  Alb  2.7<L>  /  TBili  0.4  /  DBili  x   /  AST  8<L>  /  ALT  13  /  AlkPhos  93  07-09    - TroponinI hsT: <-5.72, <-7.54    Ca    9.0        2023 07:07  Ca    8.9        2023 09:51  Phos  3.0       2023 07:07  Mg     1.8       2023 07:07    TPro  7.4    /  Alb  2.7    /  TBili  0.4    /  DBili  x      /  AST  8      /  ALT  13     /  AlkPhos  93     2023 07:07  TPro  7.3    /  Alb  2.8    /  TBili  0.5    /  DBili  x      /  AST  12     /  ALT  13     /  AlkPhos  115    2023 09:51      CARDIAC MARKERS ( 2023 07:07 )  x     / x     / 24 U/L / x     / x            07-09 @ 07:07  TSH: 0.24    Radiology/Echo/EKG: reviewed         TTE on 23 - copy in pt's chart, not available in [place]  Guero wit presered LVEF 60%, moderate MR, mild to moderate TR     EKG afib rate 101 with normal axis intervals    < from: Xray Chest 1 View- PORTABLE-Urgent (23 @ 11:51) >  ACC: [id number] EXAM:  XR CHEST PORTABLE URGENT 1V   ORDERED BY: GADIEL KULKARNI     PROCEDURE DATE:  2023          INTERPRETATION:  Chest radiograph (one view)     CPT 47528    CLINICAL INFORMATION: Short of breath.    TECHNIQUE:  Single frontalview of the chest was obtained.    FINDINGS:  Prior study dated 2023 was available for review.    The lungs imaged increased pulmonary vascular congestion. No gross   consolidation is seen. No pleural effusion is seen. The heart is enlarged.      IMPRESSION: Cardiomegaly. Increased pulmonary vascular congestion noted.    < end of copied text >    ----------------------------------------------------------------------------------------------------------------  < from: TTE Echo Complete w/o Contrast w/ Doppler (21 @ 14:49) >   Impression     Summary     Mild concentric left ventricular hypertrophy is present.   Estimated left ventricular ejection fraction is 60-65 %.   The left atrium is severely dilated.   The right atrium is not well seen.   The right ventricle is not well seen, appears grossly normal.   The aortic valve is trileafletwith thin pliable leaflets.   Fibrocalcific changes noted to the mitral valve leaflets with preserved   leaflet excursion.   Highly eccentric mitral regurgitation is noted.(? severity-study limited))   The tricuspid valve leaflets are thin and pliable; valve motion is normal.   Moderate (2+) tricuspid valve regurgitation is present.   Moderate pulmonary hypertension.   No evidence of pericardial effusion.     Signature     ----------------------------------------------------------------   Electronically signed by Chapito Sim MD([facility]   physician) on 2021 05:43 PM    < end of copied text >  ----------------------------------------------------------------------------------------------------------------------------------------

## 2023-07-11 NOTE — PROGRESS NOTE ADULT - ASSESSMENT
67 y/o man with obesity, HTN, HLD, HFpEF, mod MR, paroxysmal afib s/p Wathcman, VIJAY non adherent to CPAP, COPD, pulmonary HTN, chronic respiratory failure with hypoxia and hypercapnea, on home O2 4L/min, alcohol use disorder with alcohol-induced cirrhosis, hx of DTs, PAD hx of L BKA, recent admission in 5/29-6/5 for Parainfluenza infection associated with COPD exacerbation and probable CHF exacerbation, returns 7/8/23 with progressively worsening dyspnea, likely missed Lasix doses, also slight tremor with tongue fasciculations in the setting of daily alcohol use. Admitted for further management.    Acute on chronic diastolic CHF  In setting of alcohol consumption, non adherence to medications, admits to skipping furosemide and other medications while drinking alcohol frequently. Signs and symptoms consistent with fluid overload. Appreciate input from Cardiology and CHF Team. TTE 5/30/23 w/ EF 60-65%, LVEDd 6.16 IVS 1.33 PWd 1.32 LA Moderately dilated, moderate MR, mild to moderate TR, RSVP 33. Advised GDMT with metoprolol, spironolactone increased to 50mg daily, diuresis with Lasix IV increased to 80mg BID  - Continue diuresis with IV Lasix 80mg BID, spironolactone 50mg daily  - Continue metoprolol  - Will consider SGLT2i when on PO diuretics  - Amyloid workup in process, will discuss immunofixation, K/L ratio with CHF Team, PYP study when able  - Is and Os, daily weight  - Trend lytes and Cr  - Avoid added salt in diet, fluid restrict to 1.5L/day    Permanent atrial fibrillation  HR suboptimally controlled. Was in RVR in ED. Increased Cardizem DC to 180mg daily. Continued beta blocker. Not on AC as he is s/p Watchman. Has hx of GI bleed.   - Continue Cardizem CD and metoprolol    Moderate mitral regurgitation  As per past echo, TTE 5/30/23 EF 60-65%, LVEDd 6.16 IVS 1.33 PWd 1.32 LA Moderately dilated, moderate MR, mild to moderate TR, RSVP 33.   - Continue to monitor as outpatient    Alcohol use disorder with mild withdrawal  Improved. Patient actually does not appear to drink particularly heavily per his statements  - Continue CIWA protocol PRN, stopped standing Ativan taper  - Thiamine, MVI    Alcoholic cirrhosis  No encephalopathy. No ascites. Unknown as to presence of varices. Splenic hypodensity noted on CT.   - Continue diuretics.   - Outpatient follow up with Hepatology / GI advised.    VIJAY  Nonadherent to CPAP  - Continue NIPPV qHS, encouraged its use    COPD on home O2  Stable. Appreciate Pulmonary Medicine input  - Continue O2 supplementation  - Continue Symbicort, added Spiriva    Pulmonary nodules in MICHELLE and RLL  As noted on CT  - Repeat CT 6 wks    Chronic hyponatremia  Stable. In setting of CHF and cirrhosis. Mentation at baseline.   - Continue to monitor    Normocytic anemia  Did have an episode of rectal bleeding prior to admission, suspected to be hemorrhoidal. He does have hx of R ischemic colitis in past and underlying cirrhosis / alcohol use disorder that could also be responsible for his chronic anemia. No overt bleeding this admission. Per Dr Barker, can follow up outpatient for possible EGD and colonoscopy.   - Outpatient follow up    Vit D deficiency  25-OH vit D 17 ng/mL.   - Continue Vit D replacement      DVT px: Changed UFH q8 to LMWH q24  Code status: Full  Dispo: Anticipate DC home when clinically improved, > 48 hrs

## 2023-07-11 NOTE — PROGRESS NOTE ADULT - SUBJECTIVE AND OBJECTIVE BOX
Subjective:    Sleeping on BiPAP. Comfortable.    MEDICATIONS  (STANDING):  aspirin enteric coated 81 milliGRAM(s) Oral daily  budesonide 160 MICROgram(s)/formoterol 4.5 MICROgram(s) Inhaler 2 Puff(s) Inhalation two times a day  busPIRone 10 milliGRAM(s) Oral two times a day  cholecalciferol 2000 Unit(s) Oral daily  cyanocobalamin 1000 MICROGram(s) Oral daily  diltiazem    milliGRAM(s) Oral daily  doxazosin 1 milliGRAM(s) Oral at bedtime  folic acid 1 milliGRAM(s) Oral daily  furosemide   Injectable 80 milliGRAM(s) IV Push two times a day  gabapentin 400 milliGRAM(s) Oral three times a day  heparin   Injectable 5000 Unit(s) SubCutaneous every 8 hours  LORazepam     Tablet 1 milliGRAM(s) Oral every 4 hours  LORazepam     Tablet 0.5 milliGRAM(s) Oral every 4 hours  LORazepam     Tablet   Oral   metoprolol tartrate 25 milliGRAM(s) Oral two times a day  multivitamin 1 Tablet(s) Oral daily  pantoprazole    Tablet 40 milliGRAM(s) Oral before breakfast  QUEtiapine 100 milliGRAM(s) Oral at bedtime  spironolactone 50 milliGRAM(s) Oral daily  thiamine 100 milliGRAM(s) Oral daily  tiotropium 2.5 MICROgram(s) Inhaler 2 Puff(s) Inhalation daily    MEDICATIONS  (PRN):  acetaminophen     Tablet .. 650 milliGRAM(s) Oral every 6 hours PRN Temp greater or equal to 38C (100.4F), Mild Pain (1 - 3)  albuterol    90 MICROgram(s) HFA Inhaler 2 Puff(s) Inhalation every 6 hours PRN Shortness of Breath and/or Wheezing  LORazepam     Tablet 1 milliGRAM(s) Oral every 2 hours PRN CIWA-Ar score increase by 2 points and a total score of 7 or less  LORazepam   Injectable 1 milliGRAM(s) IV Push every 1 hour PRN CIWA-Ar score 8 or greater      Allergies    Ceclor (Rash)  Duricef (Rash)    Intolerances        Vital Signs Last 24 Hrs  T(C): 36.6 (10 Jul 2023 19:51), Max: 36.6 (10 Jul 2023 19:51)  T(F): 97.9 (10 Jul 2023 19:51), Max: 97.9 (10 Jul 2023 19:51)  HR: 89 (2023 05:50) (88 - 96)  BP: 104/69 (2023 05:50) (104/69 - 109/60)  BP(mean): 77 (2023 05:50) (71 - 77)  RR: 18 (2023 05:50) (18 - 18)  SpO2: 100% (2023 05:50) (96% - 100%)    Parameters below as of 2023 05:50  Patient On (Oxygen Delivery Method): nasal cannula        PHYSICAL EXAMINATION:    NECK:  Supple. No lymphadenopathy. Jugular venous pressure not elevated. Carotids equal.   HEART:   The cardiac impulse has a normal quality. Reg., Nl S1 and S2.    CHEST:  Chest with few basilar crackles. Min. exp wheeze.   ABDOMEN:  Soft and nontender.   EXTREMITIES:  There is tr-1+chronic edema.       LABS:        137  |  100  |  22  ----------------------------<  126<H>  4.2   |  34<H>  |  0.88    Ca    8.9      2023 06:13        Urinalysis Basic - ( 2023 06:13 )    Color: x / Appearance: x / SG: x / pH: x  Gluc: 126 mg/dL / Ketone: x  / Bili: x / Urobili: x   Blood: x / Protein: x / Nitrite: x   Leuk Esterase: x / RBC: x / WBC x   Sq Epi: x / Non Sq Epi: x / Bacteria: x    AB.37/58/69/34 95% on BiPAP    RADIOLOGY & ADDITIONAL TESTS:    Assessment/Plan:    - Diurese as darryl.-on lasix 80MG IV BID  - Low salt diet  - Aerosols with Symbicort. Spiriva   - BiPAP nocturnally  - Follow BUN/Cr-stable  - Monitor O2 Sats  - Daily weights  - Check F/U Echo  - ABG noted-chronic vent.failure    Problem/Plan - 1:  ·  Problem: Acute hypoxemic respiratory failure.   Problem/Plan - 2:  ·  Problem: Acute on chronic diastolic congestive heart failure.   Problem/Plan - 3:  ·  Problem: Pleural effusion.   Problem/Plan - 4:  ·  Problem: COPD, moderate.   Problem/Plan - 5:  ·  Problem: Chronic atrial fibrillation.

## 2023-07-12 ENCOUNTER — APPOINTMENT (OUTPATIENT)
Dept: INTERNAL MEDICINE | Facility: CLINIC | Age: 68
End: 2023-07-12

## 2023-07-12 ENCOUNTER — TRANSCRIPTION ENCOUNTER (OUTPATIENT)
Age: 68
End: 2023-07-12

## 2023-07-12 LAB
ANION GAP SERPL CALC-SCNC: 2 MMOL/L — LOW (ref 5–17)
APTT BLD: 29.1 SEC — SIGNIFICANT CHANGE UP (ref 27.5–35.5)
BUN SERPL-MCNC: 20 MG/DL — SIGNIFICANT CHANGE UP (ref 7–23)
CALCIUM SERPL-MCNC: 8.8 MG/DL — SIGNIFICANT CHANGE UP (ref 8.5–10.1)
CHLORIDE SERPL-SCNC: 100 MMOL/L — SIGNIFICANT CHANGE UP (ref 96–108)
CO2 SERPL-SCNC: 33 MMOL/L — HIGH (ref 22–31)
CREAT SERPL-MCNC: 0.86 MG/DL — SIGNIFICANT CHANGE UP (ref 0.5–1.3)
EGFR: 96 ML/MIN/1.73M2 — SIGNIFICANT CHANGE UP
GLUCOSE SERPL-MCNC: 130 MG/DL — HIGH (ref 70–99)
INR BLD: 1.03 RATIO — SIGNIFICANT CHANGE UP (ref 0.88–1.16)
MAGNESIUM SERPL-MCNC: 1.8 MG/DL — SIGNIFICANT CHANGE UP (ref 1.6–2.6)
NT-PROBNP SERPL-SCNC: 516 PG/ML — HIGH (ref 0–125)
PHOSPHATE SERPL-MCNC: 3.5 MG/DL — SIGNIFICANT CHANGE UP (ref 2.5–4.5)
POTASSIUM SERPL-MCNC: 3.8 MMOL/L — SIGNIFICANT CHANGE UP (ref 3.5–5.3)
POTASSIUM SERPL-SCNC: 3.8 MMOL/L — SIGNIFICANT CHANGE UP (ref 3.5–5.3)
PROTHROM AB SERPL-ACNC: 12 SEC — SIGNIFICANT CHANGE UP (ref 10.5–13.4)
SODIUM SERPL-SCNC: 135 MMOL/L — SIGNIFICANT CHANGE UP (ref 135–145)

## 2023-07-12 PROCEDURE — 99233 SBSQ HOSP IP/OBS HIGH 50: CPT

## 2023-07-12 RX ORDER — PHENYLEPHRINE-SHARK LIVER OIL-MINERAL OIL-PETROLATUM RECTAL OINTMENT
1 OINTMENT (GRAM) RECTAL DAILY
Refills: 0 | Status: DISCONTINUED | OUTPATIENT
Start: 2023-07-12 | End: 2023-07-14

## 2023-07-12 RX ADMIN — Medication 80 MILLIGRAM(S): at 06:55

## 2023-07-12 RX ADMIN — Medication 650 MILLIGRAM(S): at 02:22

## 2023-07-12 RX ADMIN — PANTOPRAZOLE SODIUM 40 MILLIGRAM(S): 20 TABLET, DELAYED RELEASE ORAL at 06:55

## 2023-07-12 RX ADMIN — GABAPENTIN 400 MILLIGRAM(S): 400 CAPSULE ORAL at 14:52

## 2023-07-12 RX ADMIN — Medication 1 MILLIGRAM(S): at 22:58

## 2023-07-12 RX ADMIN — Medication 1 TABLET(S): at 10:37

## 2023-07-12 RX ADMIN — PREGABALIN 1000 MICROGRAM(S): 225 CAPSULE ORAL at 10:37

## 2023-07-12 RX ADMIN — Medication 25 MILLIGRAM(S): at 10:37

## 2023-07-12 RX ADMIN — QUETIAPINE FUMARATE 100 MILLIGRAM(S): 200 TABLET, FILM COATED ORAL at 22:58

## 2023-07-12 RX ADMIN — Medication 2000 UNIT(S): at 10:37

## 2023-07-12 RX ADMIN — Medication 650 MILLIGRAM(S): at 20:00

## 2023-07-12 RX ADMIN — Medication 180 MILLIGRAM(S): at 06:54

## 2023-07-12 RX ADMIN — SPIRONOLACTONE 50 MILLIGRAM(S): 25 TABLET, FILM COATED ORAL at 10:38

## 2023-07-12 RX ADMIN — Medication 10 MILLIGRAM(S): at 22:58

## 2023-07-12 RX ADMIN — GABAPENTIN 400 MILLIGRAM(S): 400 CAPSULE ORAL at 06:55

## 2023-07-12 RX ADMIN — Medication 1 MILLIGRAM(S): at 10:37

## 2023-07-12 RX ADMIN — BUDESONIDE AND FORMOTEROL FUMARATE DIHYDRATE 2 PUFF(S): 160; 4.5 AEROSOL RESPIRATORY (INHALATION) at 09:24

## 2023-07-12 RX ADMIN — Medication 80 MILLIGRAM(S): at 14:52

## 2023-07-12 RX ADMIN — Medication 10 MILLIGRAM(S): at 10:37

## 2023-07-12 RX ADMIN — Medication 81 MILLIGRAM(S): at 10:37

## 2023-07-12 RX ADMIN — ENOXAPARIN SODIUM 40 MILLIGRAM(S): 100 INJECTION SUBCUTANEOUS at 10:37

## 2023-07-12 RX ADMIN — BUDESONIDE AND FORMOTEROL FUMARATE DIHYDRATE 2 PUFF(S): 160; 4.5 AEROSOL RESPIRATORY (INHALATION) at 20:50

## 2023-07-12 RX ADMIN — GABAPENTIN 400 MILLIGRAM(S): 400 CAPSULE ORAL at 22:58

## 2023-07-12 RX ADMIN — TIOTROPIUM BROMIDE 2 PUFF(S): 18 CAPSULE ORAL; RESPIRATORY (INHALATION) at 09:26

## 2023-07-12 NOTE — PROGRESS NOTE ADULT - SUBJECTIVE AND OBJECTIVE BOX
CHIEF COMPLAINT: Patient is a 68y old  Male who presents with a chief complaint of chf exacerbation with etoh withdrawal (08 Jul 2023 17:52)      HPI:  69 y/o male with PMH of HFpEF, paroxysmal atrial fibrillation s/p Watchman, mod MR, COPD on 4 L of home O2,  ETOH induced cirrhosis, Hx of DT s/p trach now decannulated, HTN, obesity, chronic resp failure on home O2 4L, VIJAY (noncompliant w/ CPAP), alcoholism, severe pulmonary HTN, Left BKA, PAD, presents to  with 1 day history of difficulty breathing, productive cough, wheezing. Recent admission for COPD exacerbation with CHF, and has been having sob at home the past 1 day. He is in assisted living and drinks daily. He misses medications at times, and reports he "may have" missed lasix, which he was on 60mg TID up until recently. He has SOB with orthopnea. He also had significant improvement with IV lasix in ED. At time of evaluation he is pleasant and has a slight tremor with tongue fasciculations which his family says he does not do. ROS neg for CP, n/v/d ha vision changes, or foot pain.    Cardiology consulted for CHF. Pt. was recently admitted between 5/29-6/5/23 for decompensated HFpEF. Now with increasing dyspnea in the setting of non-compliance with meds - admits to skipping lasix and other meds - and ETOH abuse - admits to recent alcohol use.     7/10/23: feels better, Tele: NSR  7/11/23: Sitting up eating breakfast.  Denies cp.  SOB improving.  +productive cough.  Tele: afib 70's-80's  7/12/23: no cardiac complaints, Sae: Afib , no ectopy     MEDICATIONS  (STANDING):  aspirin enteric coated 81 milliGRAM(s) Oral daily  budesonide 160 MICROgram(s)/formoterol 4.5 MICROgram(s) Inhaler 2 Puff(s) Inhalation two times a day  busPIRone 10 milliGRAM(s) Oral two times a day  cholecalciferol 2000 Unit(s) Oral daily  cyanocobalamin 1000 MICROGram(s) Oral daily  diltiazem    milliGRAM(s) Oral daily  doxazosin 1 milliGRAM(s) Oral at bedtime  enoxaparin Injectable 40 milliGRAM(s) SubCutaneous every 24 hours  folic acid 1 milliGRAM(s) Oral daily  furosemide   Injectable 80 milliGRAM(s) IV Push two times a day  gabapentin 400 milliGRAM(s) Oral three times a day  metoprolol tartrate 25 milliGRAM(s) Oral two times a day  multivitamin 1 Tablet(s) Oral daily  pantoprazole    Tablet 40 milliGRAM(s) Oral before breakfast  QUEtiapine 100 milliGRAM(s) Oral at bedtime  spironolactone 50 milliGRAM(s) Oral daily  tiotropium 2.5 MICROgram(s) Inhaler 2 Puff(s) Inhalation daily    MEDICATIONS  (PRN):  acetaminophen     Tablet .. 650 milliGRAM(s) Oral every 6 hours PRN Temp greater or equal to 38C (100.4F), Mild Pain (1 - 3)  albuterol    90 MICROgram(s) HFA Inhaler 2 Puff(s) Inhalation every 6 hours PRN Shortness of Breath and/or Wheezing  LORazepam     Tablet 1 milliGRAM(s) Oral every 2 hours PRN CIWA-Ar score increase by 2 points and a total score of 7 or less  LORazepam   Injectable 1 milliGRAM(s) IV Push every 1 hour PRN CIWA-Ar score 8 or greater    Vital Signs Last 24 Hrs  T(C): 36.8 (12 Jul 2023 07:47), Max: 36.8 (11 Jul 2023 20:56)  T(F): 98.3 (12 Jul 2023 07:47), Max: 98.3 (12 Jul 2023 07:47)  HR: 90 (12 Jul 2023 09:24) (76 - 97)  BP: 110/60 (12 Jul 2023 07:47) (110/60 - 121/61)  BP(mean): 74 (11 Jul 2023 13:45) (74 - 74)  RR: 18 (12 Jul 2023 07:47) (18 - 18)  SpO2: 95% (12 Jul 2023 09:24) (93% - 98%)    Parameters below as of 12 Jul 2023 09:24  Patient On (Oxygen Delivery Method): nasal cannula      PHYSICAL EXAM:  Constitutional: NAD, awake and alert  HEENT:  EOMI,  Pupils round, No oral cyanosis.  Pulmonary: Non-labored, breath sounds are clear bilaterally, No wheezing, rales or rhonchi  Cardiovascular: S1 and S2, regular rate and irreg rhythm, no Murmurs, gallops or rubs  Gastrointestinal: Bowel Sounds present, soft, nontender.   Ext: trace-1+ RLE edema; LBKA.  Neurological: Alert, no focal deficits  Skin: No rashes.  Psych:  Mood & affect appropriate    LABS: reviewed   07-12    135  |  100  |  20  ----------------------------<  130<H>  3.8   |  33<H>  |  0.86    Ca    8.8      12 Jul 2023 06:21  Phos  3.5     07-12  Mg     1.8     07-12      - TroponinI hsT: <-5.72, <-7.54    CARDIAC MARKERS ( 09 Jul 2023 07:07 )  x     / x     / 24 U/L / x     / x                                11.2   13.84 )-----------( 230      ( 09 Jul 2023 07:07 )             34.0     07-11    137  |  100  |  22  ----------------------------<  126<H>  4.2   |  34<H>  |  0.88    Ca    8.9      11 Jul 2023 06:13        07-10    135  |  100  |  20  ----------------------------<  113<H>  4.4   |  32<H>  |  0.92    Ca    9.3      10 Jul 2023 06:59  Phos  3.0     07-09  Mg     1.8     07-09    TPro  7.4  /  Alb  2.7<L>  /  TBili  0.4  /  DBili  x   /  AST  8<L>  /  ALT  13  /  AlkPhos  93  07-09    - TroponinI hsT: <-5.72, <-7.54    Ca    9.0        09 Jul 2023 07:07  Ca    8.9        08 Jul 2023 09:51  Phos  3.0       09 Jul 2023 07:07  Mg     1.8       09 Jul 2023 07:07    TPro  7.4    /  Alb  2.7    /  TBili  0.4    /  DBili  x      /  AST  8      /  ALT  13     /  AlkPhos  93     09 Jul 2023 07:07  TPro  7.3    /  Alb  2.8    /  TBili  0.5    /  DBili  x      /  AST  12     /  ALT  13     /  AlkPhos  115    08 Jul 2023 09:51      CARDIAC MARKERS ( 09 Jul 2023 07:07 )  x     / x     / 24 U/L / x     / x            07-09 @ 07:07  TSH: 0.24    Radiology/Echo/EKG: reviewed         TTE on 5/30/23 - copy in pt's chart, not available in [facility] wit presered LVEF 60%, moderate MR, mild to moderate TR     EKG afib rate 101 with normal axis intervals    < from: Xray Chest 1 View- PORTABLE-Urgent (07.08.23 @ 11:51) >  ACC: [id number] EXAM:  XR CHEST PORTABLE URGENT 1V   ORDERED BY: GADIEL KULKARNI     PROCEDURE DATE:  07/08/2023          INTERPRETATION:  Chest radiograph (one view)     CPT 87038    CLINICAL INFORMATION: Short of breath.    TECHNIQUE:  Single frontalview of the chest was obtained.    FINDINGS:  Prior study dated 6/2/2023 was available for review.    The lungs imaged increased pulmonary vascular congestion. No gross   consolidation is seen. No pleural effusion is seen. The heart is enlarged.      IMPRESSION: Cardiomegaly. Increased pulmonary vascular congestion noted.    < end of copied text >    ----------------------------------------------------------------------------------------------------------------  < from: TTE Echo Complete w/o Contrast w/ Doppler (06.30.21 @ 14:49) >   Impression     Summary     Mild concentric left ventricular hypertrophy is present.   Estimated left ventricular ejection fraction is 60-65 %.   The left atrium is severely dilated.   The right atrium is not well seen.   The right ventricle is not well seen, appears grossly normal.   The aortic valve is trileafletwith thin pliable leaflets.   Fibrocalcific changes noted to the mitral valve leaflets with preserved   leaflet excursion.   Highly eccentric mitral regurgitation is noted.(? severity-study limited))   The tricuspid valve leaflets are thin and pliable; valve motion is normal.   Moderate (2+) tricuspid valve regurgitation is present.   Moderate pulmonary hypertension.   No evidence of pericardial effusion.     Signature     ----------------------------------------------------------------   Electronically signed by Chapito Sim MD(Interpreting   physician) on 06/30/2021 05:43 PM    < end of copied text >  ----------------------------------------------------------------------------------------------------------------------------------------     REASON FOR VISIT: CHF    HPI:  69 y/o male with PMH of HFpEF, paroxysmal atrial fibrillation s/p Watchman, mod MR, COPD on 4 L of home O2,  ETOH induced cirrhosis, Hx of DT s/p trach now decannulated, HTN, obesity, chronic resp failure on home O2 4L, VIJAY (noncompliant w/ CPAP), alcoholism, severe pulmonary HTN, Left BKA, PAD, presents to  with 1 day history of difficulty breathing, productive cough, wheezing. Recent admission for COPD exacerbation with CHF, and has been having sob at home the past 1 day. He is in assisted living and drinks daily. He misses medications at times, and reports he "may have" missed lasix, which he was on 60mg TID up until recently. He has SOB with orthopnea. He also had significant improvement with IV lasix in ED. At time of evaluation he is pleasant and has a slight tremor with tongue fasciculations which his family says he does not do. ROS neg for CP, n/v/d ha vision changes, or foot pain.    Cardiology consulted for CHF. Pt. was recently admitted between 5/29-6/5/23 for decompensated HFpEF. Now with increasing dyspnea in the setting of non-compliance with meds - admits to skipping lasix and other meds - and ETOH abuse - admits to recent alcohol use.     7/10/23: feels better, Tele: NSR  7/11/23: Sitting up eating breakfast.  Denies cp.  SOB improving.  +productive cough.  Tele: afib 70's-80's  7/12/23: no cardiac complaints, Sae: Afib , no ectopy     MEDICATIONS  (STANDING):  aspirin enteric coated 81 milliGRAM(s) Oral daily  budesonide 160 MICROgram(s)/formoterol 4.5 MICROgram(s) Inhaler 2 Puff(s) Inhalation two times a day  busPIRone 10 milliGRAM(s) Oral two times a day  cholecalciferol 2000 Unit(s) Oral daily  cyanocobalamin 1000 MICROGram(s) Oral daily  diltiazem    milliGRAM(s) Oral daily  doxazosin 1 milliGRAM(s) Oral at bedtime  enoxaparin Injectable 40 milliGRAM(s) SubCutaneous every 24 hours  folic acid 1 milliGRAM(s) Oral daily  furosemide   Injectable 80 milliGRAM(s) IV Push two times a day  gabapentin 400 milliGRAM(s) Oral three times a day  metoprolol tartrate 25 milliGRAM(s) Oral two times a day  multivitamin 1 Tablet(s) Oral daily  pantoprazole    Tablet 40 milliGRAM(s) Oral before breakfast  QUEtiapine 100 milliGRAM(s) Oral at bedtime  spironolactone 50 milliGRAM(s) Oral daily  tiotropium 2.5 MICROgram(s) Inhaler 2 Puff(s) Inhalation daily    MEDICATIONS  (PRN):  acetaminophen     Tablet .. 650 milliGRAM(s) Oral every 6 hours PRN Temp greater or equal to 38C (100.4F), Mild Pain (1 - 3)  albuterol    90 MICROgram(s) HFA Inhaler 2 Puff(s) Inhalation every 6 hours PRN Shortness of Breath and/or Wheezing  LORazepam     Tablet 1 milliGRAM(s) Oral every 2 hours PRN CIWA-Ar score increase by 2 points and a total score of 7 or less  LORazepam   Injectable 1 milliGRAM(s) IV Push every 1 hour PRN CIWA-Ar score 8 or greater    Vital Signs Last 24 Hrs  T(C): 36.8 (12 Jul 2023 07:47), Max: 36.8 (11 Jul 2023 20:56)  T(F): 98.3 (12 Jul 2023 07:47), Max: 98.3 (12 Jul 2023 07:47)  HR: 90 (12 Jul 2023 09:24) (76 - 97)  BP: 110/60 (12 Jul 2023 07:47) (110/60 - 121/61)  BP(mean): 74 (11 Jul 2023 13:45) (74 - 74)  RR: 18 (12 Jul 2023 07:47) (18 - 18)  SpO2: 95% (12 Jul 2023 09:24) (93% - 98%)    Parameters below as of 12 Jul 2023 09:24  Patient On (Oxygen Delivery Method): nasal cannula      PHYSICAL EXAM:  Constitutional: NAD, awake and alert  HEENT:  EOMI,  Pupils round, No oral cyanosis.  Pulmonary: Non-labored, breath sounds are clear bilaterally, No wheezing, rales or rhonchi  Cardiovascular: S1 and S2, regular rate and irreg rhythm, no Murmurs, gallops or rubs  Gastrointestinal: Bowel Sounds present, soft, nontender.   Ext: trace-1+ RLE edema; LBKA.  Neurological: Alert, no focal deficits  Skin: No rashes.  Psych:  Mood & affect appropriate    LABS:     135  |  100  |  20  ----------------------------<  130<H>  3.8   |  33<H>  |  0.86    Ca    8.8      12 Jul 2023 06:21  Phos  3.5     07-12  Mg     1.8     07-12    TroponinI hsT: <-5.72, <-7.54    CARDIAC MARKERS ( 09 Jul 2023 07:07 )x     / x     / 24 U/L / x     / x          TTE on 5/30/23 - copy in pt's chart, not available in [facility]  Echo with preserved LVEF 60%, moderate MR, mild to moderate TR     EKG afib rate 101 with normal axis intervals    Xray Chest 1 View- PORTABLE-Urgent (07.08.23 @ 11:51):  The lungs imaged increased pulmonary vascular congestion. No gross consolidation is seen. No pleural effusion is seen. The heart is enlarged.  IMPRESSION: Cardiomegaly. Increased pulmonary vascular congestion noted.

## 2023-07-12 NOTE — PROGRESS NOTE ADULT - SUBJECTIVE AND OBJECTIVE BOX
Subjective:    Awake, comfortable at rest. No sputum.    MEDICATIONS  (STANDING):  aspirin enteric coated 81 milliGRAM(s) Oral daily  budesonide 160 MICROgram(s)/formoterol 4.5 MICROgram(s) Inhaler 2 Puff(s) Inhalation two times a day  busPIRone 10 milliGRAM(s) Oral two times a day  cholecalciferol 2000 Unit(s) Oral daily  cyanocobalamin 1000 MICROGram(s) Oral daily  diltiazem    milliGRAM(s) Oral daily  doxazosin 1 milliGRAM(s) Oral at bedtime  enoxaparin Injectable 40 milliGRAM(s) SubCutaneous every 24 hours  folic acid 1 milliGRAM(s) Oral daily  furosemide   Injectable 80 milliGRAM(s) IV Push two times a day  gabapentin 400 milliGRAM(s) Oral three times a day  metoprolol tartrate 25 milliGRAM(s) Oral two times a day  multivitamin 1 Tablet(s) Oral daily  pantoprazole    Tablet 40 milliGRAM(s) Oral before breakfast  QUEtiapine 100 milliGRAM(s) Oral at bedtime  spironolactone 50 milliGRAM(s) Oral daily  tiotropium 2.5 MICROgram(s) Inhaler 2 Puff(s) Inhalation daily    MEDICATIONS  (PRN):  acetaminophen     Tablet .. 650 milliGRAM(s) Oral every 6 hours PRN Temp greater or equal to 38C (100.4F), Mild Pain (1 - 3)  albuterol    90 MICROgram(s) HFA Inhaler 2 Puff(s) Inhalation every 6 hours PRN Shortness of Breath and/or Wheezing  LORazepam     Tablet 1 milliGRAM(s) Oral every 2 hours PRN CIWA-Ar score increase by 2 points and a total score of 7 or less  LORazepam   Injectable 1 milliGRAM(s) IV Push every 1 hour PRN CIWA-Ar score 8 or greater      Allergies    Ceclor (Rash)  Duricef (Rash)    Intolerances        Vital Signs Last 24 Hrs  T(C): 36.8 (12 Jul 2023 07:47), Max: 36.8 (11 Jul 2023 20:56)  T(F): 98.3 (12 Jul 2023 07:47), Max: 98.3 (12 Jul 2023 07:47)  HR: 83 (12 Jul 2023 07:47) (76 - 97)  BP: 110/60 (12 Jul 2023 07:47) (110/60 - 121/61)  BP(mean): 74 (11 Jul 2023 13:45) (74 - 74)  RR: 18 (12 Jul 2023 07:47) (18 - 18)  SpO2: 93% (12 Jul 2023 07:47) (93% - 98%)    Parameters below as of 12 Jul 2023 07:47  Patient On (Oxygen Delivery Method): nasal cannula  O2 Flow (L/min): 3      PHYSICAL EXAMINATION:    NECK:  Supple. No lymphadenopathy. Jugular venous pressure not elevated.   HEART:   The cardiac impulse has a normal quality. Reg., Nl S1 and S2.    CHEST:  Chest with diffusely diminished BS. Few basilar crackles, L>R. Normal respiratory effort.  ABDOMEN:  Soft and nontender.   EXTREMITIES:  There is 1-2+ edema.       LABS:    07-12    135  |  100  |  20  ----------------------------<  130<H>  3.8   |  33<H>  |  0.86    Ca    8.8      12 Jul 2023 06:21  Phos  3.5     07-12  Mg     1.8     07-12      PT/INR - ( 12 Jul 2023 06:21 )   PT: 12.0 sec;   INR: 1.03 ratio         PTT - ( 12 Jul 2023 06:21 )  PTT:29.1 sec  Urinalysis Basic - ( 12 Jul 2023 06:21 )    Color: x / Appearance: x / SG: x / pH: x  Gluc: 130 mg/dL / Ketone: x  / Bili: x / Urobili: x   Blood: x / Protein: x / Nitrite: x   Leuk Esterase: x / RBC: x / WBC x   Sq Epi: x / Non Sq Epi: x / Bacteria: x        RADIOLOGY & ADDITIONAL TESTS:    Assessment/Plan:    - Diurese as darryl.-on lasix 80MG IV BID as per cardiology  - Low salt diet  - Aerosols with Symbicort. Spiriva   - BiPAP nocturnally  - Follow BUN/Cr-stable  - Monitor O2 Sats  - Daily weights      Problem/Plan - 1:  ·  Problem: Acute hypoxemic respiratory failure.   Problem/Plan - 2:  ·  Problem: Acute on chronic diastolic congestive heart failure.   Problem/Plan - 3:  ·  Problem: Pleural effusion.   Problem/Plan - 4:  ·  Problem: COPD, moderate.   Problem/Plan - 5:  ·  Problem: Chronic atrial fibrillation.

## 2023-07-12 NOTE — DISCHARGE NOTE NURSING/CASE MANAGEMENT/SOCIAL WORK - NSDCVIVACCINE_GEN_ALL_CORE_FT
influenza, injectable, quadrivalent, preservative free; 21-Nov-2019 14:53; Cathryn Burks (RN); [employer]; [id number] (Exp. Date: 30-Jun-2020); IntraMuscular; Deltoid Left.; 0.5 milliLiter(s); VIS (VIS Published: 15-Aug-2019, VIS Presented: 21-Nov-2019);   influenza, injectable, quadrivalent, preservative free; 17-Oct-2020 16:13; Bhavya Brown (RN); [employer]; [id number] (Exp. Date: 30-Jun-2021); IntraMuscular; Deltoid Left.; 0.5 milliLiter(s); VIS (VIS Published: 15-Aug-2019, VIS Presented: 17-Oct-2020);

## 2023-07-12 NOTE — DISCHARGE NOTE NURSING/CASE MANAGEMENT/SOCIAL WORK - PATIENT PORTAL LINK FT
You can access the FollowMyHealth Patient Portal offered by [facility] by registering at the following website: [email or web address]. By joining [facility]’s FollowMyHealth portal, you will also be able to view your health information using other applications (apps) compatible with our system.

## 2023-07-12 NOTE — PROGRESS NOTE ADULT - ASSESSMENT
67 y/o man with obesity, HTN, HLD, HFpEF, mod MR, paroxysmal afib s/p Wathcman, VIJAY non adherent to CPAP, COPD, pulmonary HTN, chronic respiratory failure with hypoxia and hypercapnea, on home O2 4L/min, alcohol use disorder with alcohol-induced cirrhosis, hx of DTs, PAD hx of L BKA, recent admission in 5/29-6/5 for Parainfluenza infection associated with COPD exacerbation and probable CHF exacerbation, returns 7/8/23 with progressively worsening dyspnea, likely missed Lasix doses, also slight tremor with tongue fasciculations in the setting of daily alcohol use. Admitted for further management.    Acute on chronic diastolic CHF  In setting of alcohol consumption, non adherence to medications, admits to skipping furosemide and other medications while drinking alcohol frequently. Signs and symptoms consistent with fluid overload. Appreciate input from Cardiology and CHF Team. TTE 5/30/23 w/ EF 60-65%, LVEDd 6.16 IVS 1.33 PWd 1.32 LA Moderately dilated, moderate MR, mild to moderate TR, RSVP 33. Advised GDMT with metoprolol, spironolactone increased to 50mg daily, diuresis with Lasix IV increased to 80mg BID  - Continue diuresis with IV Lasix 80mg BID, spironolactone 50mg daily  - Continue metoprolol  - Will consider SGLT2i when on PO diuretics  - Amyloid workup in process, will discuss immunofixation, K/L ratio with CHF Team, PYP study when able  - Is and Os, daily weight  - Trend lytes and Cr  - Avoid added salt in diet, fluid restrict to 1.5L/day    Permanent atrial fibrillation  HR suboptimally controlled. Was in RVR in ED. Increased Cardizem DC to 180mg daily. Continued beta blocker. Not on AC as he is s/p Watchman. Has hx of GI bleed.   - Continue Cardizem CD and metoprolol    Moderate mitral regurgitation  As per past echo, TTE 5/30/23 EF 60-65%, LVEDd 6.16 IVS 1.33 PWd 1.32 LA Moderately dilated, moderate MR, mild to moderate TR, RSVP 33.   - Continue to monitor as outpatient    Alcohol use disorder with mild withdrawal  Improved. Patient actually does not appear to drink particularly heavily per his statements. Not scoring on CIWA. Ativan discontinued.   - Continue Thiamine, MVI    Alcoholic cirrhosis  No encephalopathy. No ascites. Unknown as to presence of varices. Splenic hypodensity noted on CT.   - Continue diuretics.   - Outpatient follow up with Hepatology / GI advised.    VIJAY  Nonadherent to CPAP  - Continue NIPPV qHS, encouraged its use    COPD on home O2  Stable. Appreciate Pulmonary Medicine input  - Continue O2 supplementation  - Continue Symbicort, added Spiriva    Pulmonary nodules in MICHELLE and RLL  As noted on CT  - Repeat CT 6 wks    Chronic hyponatremia  Stable. In setting of CHF and cirrhosis. Mentation at baseline.   - Continue to monitor    Normocytic anemia  Did have an episode of rectal bleeding prior to admission, suspected to be hemorrhoidal. He does have hx of R ischemic colitis in past and underlying cirrhosis / alcohol use disorder that could also be responsible for his chronic anemia. No overt bleeding this admission. Per Dr Barker, can follow up outpatient for possible EGD and colonoscopy.   - Outpatient follow up    Vit D deficiency  25-OH vit D 17 ng/mL.   - Continue Vit D replacement      DVT px: LMWH  Code status: Full  Dispo: Anticipate DC home back to assisted living when clinically improved, ~48 hrs 69 y/o man with obesity, HTN, HLD, HFpEF, mod MR, paroxysmal afib s/p Watchman, PAD hx of L BKA, VIJAY non adherent to CPAP, OHS, COPD, pulmonary HTN, chronic respiratory failure with hypoxia and hypercapnea, on home O2 4L/min, also with hemorrhoids s/p ligation earlier this year, and cirrhosis likely related to alcohol use and fatty liver due to obesity. He was recently admitted 5/29-6/5 for Parainfluenza infection associated with COPD and CHF exacerbations. He now returns 7/8/23 with progressively worsening dyspnea, likely missed Lasix doses, also slight tremor with tongue fasciculations in the setting of alcohol use at his assisted living residence. Admitted for further management.    Acute on chronic diastolic CHF  In setting of alcohol consumption, non adherence to medications, admits to skipping furosemide and other medications while drinking alcohol. Presenting signs and symptoms consistent with fluid overload. Appreciate input from Cardiology and CHF Team. TTE 5/30/23 w/ EF 60-65%, LVEDd 6.16 IVS 1.33 PWd 1.32 LA Moderately dilated, moderate MR, mild to moderate TR, RSVP 33. Advised GDMT with metoprolol, spironolactone increased to 50mg daily, diuresis with Lasix IV, increased to 80mg BID.  - Continue diuresis with IV Lasix 80mg BID, spironolactone 50mg daily  - Continue metoprolol  - Will consider SGLT2i when on PO diuretics  - Amyloid workup in process, will discuss immunofixation, K/L ratio with CHF Team, PYP study when able  - Is and Os, daily weight  - Trend lytes and Cr  - Avoid added salt in diet, fluid restrict to 1.5L/day    Permanent atrial fibrillation  HR suboptimally controlled. Was in RVR in ED. Increased Cardizem DC to 180mg daily. Continued beta blocker. Not on AC as he is s/p Watchman. Has hx of GI bleed.   - Continue Cardizem CD and metoprolol    Moderate mitral regurgitation  As per past echo, TTE 5/30/23 EF 60-65%, LVEDd 6.16 IVS 1.33 PWd 1.32 LA Moderately dilated, moderate MR, mild to moderate TR, RSVP 33.   - Continue to monitor as outpatient    Alcohol use disorder   Questionable mild withdrawal upon presentation. Initially treated with standing Ativan and prn Ativan via CIWA-protocol. Mild tremulousness improved but patient actually appeared drowsy, could have been from Ativan and/or transient worsening hypercapnea, though blood gasses have been close to baseline. Not scoring on CIWA. Ativan discontinued. Patient more alert today.   - Continue Thiamine, MVI  - SW evaluation    Cirrhosis  Etiology of cirrhosis likely related to alcohol use and fatty liver due to obesity. No encephalopathy. No ascites. Unknown as to presence of varices. Splenic hypodensity noted on CT.   - Continue diuretics.   - Outpatient follow up with Hepatology / GI advised.    Hemorrhoids  Had hemorrhoid ligation earlier this year.  - Continue to monitor    VIJAY  Nonadherent to CPAP  - Continue NIPPV qHS, encouraged its use    COPD on home O2  Stable. Appreciate Pulmonary Medicine input  - Continue O2 supplementation  - Continue Symbicort, added Spiriva    Pulmonary nodules in MICHELLE and RLL  As noted on CT  - Repeat CT 6 wks    Chronic hyponatremia  Stable. In setting of CHF and cirrhosis. Mentation at baseline.   - Continue to monitor    Normocytic anemia  Did have an episode of rectal bleeding prior to admission, suspected to be hemorrhoidal. He does have hx of R ischemic colitis in past and underlying cirrhosis / alcohol use disorder that could also be responsible for his chronic anemia. No overt bleeding this admission. Per Dr Barker, can follow up outpatient for possible EGD and colonoscopy.   - Outpatient follow up    Vit D deficiency  25-OH vit D 17 ng/mL.   - Continue Vit D replacement      DVT px: LMWH  Code status: Full  Dispo: Anticipate DC home back to assisted living when clinically improved, ~48 hrs

## 2023-07-12 NOTE — DISCHARGE NOTE NURSING/CASE MANAGEMENT/SOCIAL WORK - NSDCPEFALRISK_GEN_ALL_CORE
For information on Fall & Injury Prevention, visit: [email or web address] OR  [email or web address] OR  https://www.cdc.gov/steadi/patient.html

## 2023-07-12 NOTE — PROGRESS NOTE ADULT - SUBJECTIVE AND OBJECTIVE BOX
Chief Complaint: Shortness of breath, chest congestion    Interval Hx: Patient reports feeling somewhat improved today as compared to admission. Breathing more comfortably, less chest congestion and less crackles. No dyspnea at rest. Still with dyspnea on mild exertion. No chest pain or tightness. No other acute complaints. Abdomen is obese but soft and without tenderness. No abdominal complaints such as pain, nausea, vomiting, diarrhea, constipation. Abd US showed no ascites. He has edema in RLE but improving since admission as per patient. LLE has BKA without edema. CHF Team advised to continue diuretics as we are doing. Encouraged patient to use NIPPV overnight. No signs of alcohol withdrawal. CIWA discontinued.     ROS: Multi system review is comprehensively negative x 10 systems except as above    Vitals:  T(F): 97.9 (12 Jul 2023 20:41), Max: 98.3 (12 Jul 2023 07:47)  HR: 93 (12 Jul 2023 20:41) (76 - 99)  BP: 108/55 (12 Jul 2023 20:41) (108/55 - 130/71)  RR: 18 (12 Jul 2023 20:41) (18 - 18)  SpO2: 100% (12 Jul 2023 20:41) (93% - 100%) on 4L/min (same as at his assisted living)    Exam:  Gen: No acute distress   HEENT: NCAT PERRL EOMI  MMM   clear oropharynx  Neck: Supple  CVS: s1 s2 normal, regular, rate ~90  Chest: Decreased resp effort, overall diminished breath sounds, still with rales in bases, L > R but improved since admission  Abd: Obese, +BS, non distended, soft, non tender  Ext: RLE edema, LLE amputation  Skin: Warm, dry. Hyperpigmentation of the RLE. Crusted over old wounds on RLE.   Mood: Calm, pleasant  Neuro: A+OX3, no gross deficits, no tremulousness, no asterixis    Labs:                  11.2   13.84 )--------( 230             34.0       135  |  100  |  20  -----------------------<  130  3.8   |   33   |  0.86    Ca 8.8    Phos 3.5   Mg 1.8    PT: 12.0 sec;   INR: 1.03 ratio   PTT:29.1 sec    Urinalysis Basic - ( 12 Jul 2023 06:21 )    TPro  7.4  /  Alb  2.7  /  TBili  0.4  /  DBili  x   /  AST  8 /  ALT  13  /  AlkPhos  93      Lactate 2.2   Troponin negative   proBNP 1755 --> 516       A1c 5.9    TSH 0.24    Ammonia 19    Kappa/Lambda free LC ratio 1.68    ABG 7/11 pH 7.37  pCO2 58  pO2 69  O2 95%  VBG 7/10 pH 7.38  pCO2 60  pO2 40  O2 62%    Micro:  COVID19 PCR negative  Flu PCR negative  RSV PCR negative    Cardiac Testing:  PYP study requested but patient unable to tolerate as having trouble laying flay  TTE ordered, cancelled by performing department  EKG 7/8: Afib, rate 101, normal axis, normal intervals  Prior visit cardiac testing  TTE 5/30/23:  EF 60-65%, LVEDd 6.16 IVS 1.33 PWd 1.32 LA Moderately dilated, moderate MR, mild to moderate TR, RSVP 33.     Imaging:  CXR 7/8: The lungs imaged increased pulmonary vascular congestion. No gross consolidation is seen. No pleural effusion is seen. The heart is enlarged.    Meds:  MEDICATIONS  (STANDING):  aspirin enteric coated 81 milliGRAM(s) Oral daily  budesonide 160 MICROgram(s)/formoterol 4.5 MICROgram(s) Inhaler 2 Puff(s) Inhalation two times a day  busPIRone 10 milliGRAM(s) Oral two times a day  cholecalciferol 2000 Unit(s) Oral daily  cyanocobalamin 1000 MICROGram(s) Oral daily  diltiazem    milliGRAM(s) Oral daily  doxazosin 1 milliGRAM(s) Oral at bedtime  enoxaparin Injectable 40 milliGRAM(s) SubCutaneous every 24 hours  folic acid 1 milliGRAM(s) Oral daily  furosemide   Injectable 80 milliGRAM(s) IV Push two times a day  gabapentin 400 milliGRAM(s) Oral three times a day  metoprolol tartrate 25 milliGRAM(s) Oral two times a day  multivitamin 1 Tablet(s) Oral daily  pantoprazole    Tablet 40 milliGRAM(s) Oral before breakfast  QUEtiapine 100 milliGRAM(s) Oral at bedtime  spironolactone 50 milliGRAM(s) Oral daily  tiotropium 2.5 MICROgram(s) Inhaler 2 Puff(s) Inhalation daily    MEDICATIONS  (PRN):  acetaminophen     Tablet .. 650 milliGRAM(s) Oral every 6 hours PRN Temp greater or equal to 38C (100.4F), Mild Pain (1 - 3)  albuterol    90 MICROgram(s) HFA Inhaler 2 Puff(s) Inhalation every 6 hours PRN Shortness of Breath and/or Wheezing  hemorrhoidal Ointment 1 Application(s) Rectal daily PRN hemorrhoid                 Chief Complaint: Shortness of breath, chest congestion    Interval Hx: Patient reports feeling somewhat improved today as compared to admission. Breathing more comfortably, less chest congestion and less crackles. No dyspnea at rest. Still with dyspnea on mild exertion. No chest pain or tightness. No other acute complaints. Abdomen is obese but soft and without tenderness. No abdominal complaints such as pain, nausea, vomiting, diarrhea, constipation. Abd US showed no ascites. He has edema in RLE but improving since admission as per patient. LLE has BKA without edema. CHF Team advised to continue diuretics as we are doing. Encouraged patient to use NIPPV overnight. No signs of alcohol withdrawal. CIWA discontinued.     ROS: Multi system review is comprehensively negative x 10 systems except as above    Vitals:  T(F): 97.9 (12 Jul 2023 20:41), Max: 98.3 (12 Jul 2023 07:47)  HR: 93 (12 Jul 2023 20:41) (76 - 99)  BP: 108/55 (12 Jul 2023 20:41) (108/55 - 130/71)  RR: 18 (12 Jul 2023 20:41) (18 - 18)  SpO2: 100% (12 Jul 2023 20:41) (93% - 100%) on 4L/min (same as at his assisted living)    Exam:  Gen: No acute distress   HEENT: NCAT PERRL EOMI  MMM   clear oropharynx  Neck: Supple  CVS: s1 s2 normal, regular, rate ~90  Chest: Decreased resp effort, overall diminished breath sounds, still with rales in bases, L > R but improved since admission  Abd: Obese, +BS, non distended, soft, non tender  Ext: RLE edema, LLE amputation  Skin: Warm, dry. Hyperpigmentation of the RLE. Crusted over old wounds on RLE.   Mood: Calm, pleasant  Neuro: A+OX3, no gross deficits, no tremulousness, no asterixis    Labs:                  11.2   13.84 )--------( 230             34.0       135  |  100  |  20  -----------------------<  130  3.8   |   33   |  0.86    Ca 8.8    Phos 3.5   Mg 1.8    PT: 12.0 sec;   INR: 1.03 ratio   PTT:29.1 sec    Urinalysis Basic - ( 12 Jul 2023 06:21 )    TPro  7.4  /  Alb  2.7  /  TBili  0.4  /  DBili  x   /  AST  8 /  ALT  13  /  AlkPhos  93      Lactate 2.2   Troponin negative   proBNP 1755 --> 516       A1c 5.9    TSH 0.24    Ammonia 19    Kappa/Lambda free LC ratio 1.68    ABG 7/11 pH 7.37  pCO2 58  pO2 69  O2 95%  VBG 7/10 pH 7.38  pCO2 60  pO2 40  O2 62%    Micro:  COVID19 PCR negative  Flu PCR negative  RSV PCR negative    Cardiac Testing:  PYP study requested but patient unable to tolerate as having trouble laying flay    TTE ordered, cancelled by performing department as he had a TTE 5/30/23 but it is not appearing in [place], see below    EKG 7/8: Afib, rate 101, normal axis, normal intervals    Prior visit cardiac testing:  TTE 5/30/23:  EF 60-65%, LVEDd 6.16 IVS 1.33 PWd 1.32 LA Moderately dilated, moderate MR, mild to moderate TR, RSVP 33.     Imaging:  CXR 7/8: The lungs imaged increased pulmonary vascular congestion. No gross consolidation is seen. No pleural effusion is seen. The heart is enlarged.    Meds:  MEDICATIONS  (STANDING):  aspirin enteric coated 81 milliGRAM(s) Oral daily  budesonide 160 MICROgram(s)/formoterol 4.5 MICROgram(s) Inhaler 2 Puff(s) Inhalation two times a day  busPIRone 10 milliGRAM(s) Oral two times a day  cholecalciferol 2000 Unit(s) Oral daily  cyanocobalamin 1000 MICROGram(s) Oral daily  diltiazem    milliGRAM(s) Oral daily  doxazosin 1 milliGRAM(s) Oral at bedtime  enoxaparin Injectable 40 milliGRAM(s) SubCutaneous every 24 hours  folic acid 1 milliGRAM(s) Oral daily  furosemide   Injectable 80 milliGRAM(s) IV Push two times a day  gabapentin 400 milliGRAM(s) Oral three times a day  metoprolol tartrate 25 milliGRAM(s) Oral two times a day  multivitamin 1 Tablet(s) Oral daily  pantoprazole    Tablet 40 milliGRAM(s) Oral before breakfast  QUEtiapine 100 milliGRAM(s) Oral at bedtime  spironolactone 50 milliGRAM(s) Oral daily  tiotropium 2.5 MICROgram(s) Inhaler 2 Puff(s) Inhalation daily    MEDICATIONS  (PRN):  acetaminophen     Tablet .. 650 milliGRAM(s) Oral every 6 hours PRN Temp greater or equal to 38C (100.4F), Mild Pain (1 - 3)  albuterol    90 MICROgram(s) HFA Inhaler 2 Puff(s) Inhalation every 6 hours PRN Shortness of Breath and/or Wheezing  hemorrhoidal Ointment 1 Application(s) Rectal daily PRN hemorrhoid

## 2023-07-13 ENCOUNTER — APPOINTMENT (OUTPATIENT)
Age: 68
End: 2023-07-13

## 2023-07-13 LAB
ANION GAP SERPL CALC-SCNC: 2 MMOL/L — LOW (ref 5–17)
BUN SERPL-MCNC: 13 MG/DL — SIGNIFICANT CHANGE UP (ref 7–23)
CALCIUM SERPL-MCNC: 8.9 MG/DL — SIGNIFICANT CHANGE UP (ref 8.5–10.1)
CHLORIDE SERPL-SCNC: 96 MMOL/L — SIGNIFICANT CHANGE UP (ref 96–108)
CO2 SERPL-SCNC: 36 MMOL/L — HIGH (ref 22–31)
CREAT SERPL-MCNC: 0.81 MG/DL — SIGNIFICANT CHANGE UP (ref 0.5–1.3)
CULTURE RESULTS: SIGNIFICANT CHANGE UP
CULTURE RESULTS: SIGNIFICANT CHANGE UP
EGFR: 97 ML/MIN/1.73M2 — SIGNIFICANT CHANGE UP
GLUCOSE SERPL-MCNC: 126 MG/DL — HIGH (ref 70–99)
INTERPRETATION SERPL IFE-IMP: SIGNIFICANT CHANGE UP
POTASSIUM SERPL-MCNC: 3.5 MMOL/L — SIGNIFICANT CHANGE UP (ref 3.5–5.3)
POTASSIUM SERPL-SCNC: 3.5 MMOL/L — SIGNIFICANT CHANGE UP (ref 3.5–5.3)
SARS-COV-2 RNA SPEC QL NAA+PROBE: SIGNIFICANT CHANGE UP
SODIUM SERPL-SCNC: 134 MMOL/L — LOW (ref 135–145)
SPECIMEN SOURCE: SIGNIFICANT CHANGE UP
SPECIMEN SOURCE: SIGNIFICANT CHANGE UP

## 2023-07-13 PROCEDURE — 99233 SBSQ HOSP IP/OBS HIGH 50: CPT

## 2023-07-13 PROCEDURE — 99232 SBSQ HOSP IP/OBS MODERATE 35: CPT

## 2023-07-13 RX ORDER — DAPAGLIFLOZIN 10 MG/1
1 TABLET, FILM COATED ORAL
Qty: 30 | Refills: 0
Start: 2023-07-13 | End: 2023-08-11

## 2023-07-13 RX ORDER — FUROSEMIDE 40 MG
40 TABLET ORAL
Refills: 0 | Status: DISCONTINUED | OUTPATIENT
Start: 2023-07-13 | End: 2023-07-14

## 2023-07-13 RX ADMIN — TIOTROPIUM BROMIDE 2 PUFF(S): 18 CAPSULE ORAL; RESPIRATORY (INHALATION) at 10:21

## 2023-07-13 RX ADMIN — BUDESONIDE AND FORMOTEROL FUMARATE DIHYDRATE 2 PUFF(S): 160; 4.5 AEROSOL RESPIRATORY (INHALATION) at 20:40

## 2023-07-13 RX ADMIN — SPIRONOLACTONE 50 MILLIGRAM(S): 25 TABLET, FILM COATED ORAL at 11:57

## 2023-07-13 RX ADMIN — Medication 1 MILLIGRAM(S): at 11:58

## 2023-07-13 RX ADMIN — Medication 80 MILLIGRAM(S): at 06:03

## 2023-07-13 RX ADMIN — ENOXAPARIN SODIUM 40 MILLIGRAM(S): 100 INJECTION SUBCUTANEOUS at 11:58

## 2023-07-13 RX ADMIN — Medication 10 MILLIGRAM(S): at 11:57

## 2023-07-13 RX ADMIN — QUETIAPINE FUMARATE 100 MILLIGRAM(S): 200 TABLET, FILM COATED ORAL at 22:39

## 2023-07-13 RX ADMIN — GABAPENTIN 400 MILLIGRAM(S): 400 CAPSULE ORAL at 06:03

## 2023-07-13 RX ADMIN — Medication 25 MILLIGRAM(S): at 18:27

## 2023-07-13 RX ADMIN — Medication 180 MILLIGRAM(S): at 11:56

## 2023-07-13 RX ADMIN — Medication 1 MILLIGRAM(S): at 22:38

## 2023-07-13 RX ADMIN — Medication 40 MILLIGRAM(S): at 15:02

## 2023-07-13 RX ADMIN — BUDESONIDE AND FORMOTEROL FUMARATE DIHYDRATE 2 PUFF(S): 160; 4.5 AEROSOL RESPIRATORY (INHALATION) at 10:21

## 2023-07-13 RX ADMIN — Medication 2000 UNIT(S): at 12:07

## 2023-07-13 RX ADMIN — PREGABALIN 1000 MICROGRAM(S): 225 CAPSULE ORAL at 11:58

## 2023-07-13 RX ADMIN — Medication 81 MILLIGRAM(S): at 11:57

## 2023-07-13 RX ADMIN — Medication 10 MILLIGRAM(S): at 22:38

## 2023-07-13 RX ADMIN — PANTOPRAZOLE SODIUM 40 MILLIGRAM(S): 20 TABLET, DELAYED RELEASE ORAL at 06:03

## 2023-07-13 RX ADMIN — GABAPENTIN 400 MILLIGRAM(S): 400 CAPSULE ORAL at 15:02

## 2023-07-13 RX ADMIN — GABAPENTIN 400 MILLIGRAM(S): 400 CAPSULE ORAL at 22:38

## 2023-07-13 RX ADMIN — Medication 1 TABLET(S): at 11:57

## 2023-07-13 NOTE — PROGRESS NOTE ADULT - ASSESSMENT
67 y/o man with obesity, HTN, HLD, HFpEF, mod MR, paroxysmal afib s/p Watchman, PAD hx of L BKA, VIJAY non adherent to CPAP, OHS, COPD, pulmonary HTN, chronic respiratory failure with hypoxia and hypercapnea, on home O2 4L/min, also with hemorrhoids s/p ligation earlier this year, and cirrhosis likely related to alcohol use and fatty liver due to obesity. He was recently admitted 5/29-6/5 for Parainfluenza infection associated with COPD and CHF exacerbations. He now returns 7/8/23 with progressively worsening dyspnea, likely missed Lasix doses, also slight tremor with tongue fasciculations in the setting of alcohol use at his assisted living residence. Admitted for further management.    Acute on chronic diastolic CHF  In setting of alcohol consumption, non adherence to medications, admits to skipping furosemide and other medications while drinking alcohol. Presenting signs and symptoms consistent with fluid overload. Appreciate input from Cardiology and CHF Team. TTE 5/30/23 w/ EF 60-65%, LVEDd 6.16 IVS 1.33 PWd 1.32 LA Moderately dilated, moderate MR, mild to moderate TR, RSVP 33.   -BB, aldactone  -lasix now oral  -adding farxiga, cost checked  - Amyloid workup in process, will discuss immunofixation, K/L ratio with CHF Team, PYP study hopefully tmrw  - Is and Os, daily weight  - Trend lytes and Cr  - Avoid added salt in diet, fluid restrict to 1.5L/day  (pt not following restriction, reinforced by RN)    Permanent atrial fibrillation  HR suboptimally controlled. Was in RVR in ED. Increased Cardizem DC to 180mg daily. Continued beta blocker. Not on AC as he is s/p Watchman. Has hx of GI bleed.   - Continue Cardizem CD and metoprolol    Moderate mitral regurgitation  As per past echo, TTE 5/30/23 EF 60-65%, LVEDd 6.16 IVS 1.33 PWd 1.32 LA Moderately dilated, moderate MR, mild to moderate TR, RSVP 33.   - Continue to monitor as outpatient    Alcohol use disorder   Questionable mild withdrawal upon presentation. Initially treated with standing Ativan and prn Ativan via CIWA-protocol. Mild tremulousness improved but patient actually appeared drowsy, could have been from Ativan and/or transient worsening hypercapnea, though blood gasses have been close to baseline. Not scoring on CIWA. Ativan discontinued. Patient more alert today.   - Continue Thiamine, MVI  - SW evaluation    Cirrhosis  Etiology of cirrhosis likely related to alcohol use and fatty liver due to obesity. No encephalopathy. No ascites. Unknown as to presence of varices. Splenic hypodensity noted on CT.   - Continue diuretics.   - Outpatient follow up with Hepatology / GI advised.    Hemorrhoids  Had hemorrhoid ligation earlier this year.  - Continue to monitor    VIJAY  Nonadherent to CPAP  - Continue NIPPV qHS, encouraged its use    COPD on home O2  Stable. Appreciate Pulmonary Medicine input  - Continue O2 supplementation  - Continue Symbicort, added Spiriva    Pulmonary nodules in MICHELLE and RLL  As noted on CT  - Repeat CT 6 wks    Chronic hyponatremia  Stable. In setting of CHF and cirrhosis. Mentation at baseline.   - Continue to monitor    Normocytic anemia  Did have an episode of rectal bleeding prior to admission, suspected to be hemorrhoidal. He does have hx of R ischemic colitis in past and underlying cirrhosis / alcohol use disorder that could also be responsible for his chronic anemia. No overt bleeding this admission. Per Dr Barker, can follow up outpatient for possible EGD and colonoscopy.   - Outpatient follow up    Vit D deficiency  25-OH vit D 17 ng/mL.   - Continue Vit D replacement      DVT px: LMWH  Code status: Full  Dispo:  Poss d/c 7/14 pending diuresis, cards and CHF clearance

## 2023-07-13 NOTE — PHYSICAL THERAPY INITIAL EVALUATION ADULT - MODALITIES TREATMENT COMMENTS
pt left seated @ b/s post Eval @ pt request; bed alarm on; O2 5L/min nc, HM in place; callbell in reach; pt instructed not to get up alone; call nursing for assist; darryl well; denied pain

## 2023-07-13 NOTE — PROGRESS NOTE ADULT - TIME BILLING
Time spent  coordinating the patient's care. This includes reviewing documentation pertinent to this admission, results and imaging. Further tests, medications, and procedures have been ordered as indicated. Laboratory results and the plan of care were communicated to the patient. Discussed care plan with consultants including cards, CHF . Supporting documentation was completed and added to the patient's chart.

## 2023-07-13 NOTE — PHYSICAL THERAPY INITIAL EVALUATION ADULT - GENERAL OBSERVATIONS, REHAB EVAL
O2 5L/min nc; HM; pt rec'd seated @ b's; ; pt denied pain; ANTONINO Mcclelland presentl; R BKA / L LE erythema / scabs noted

## 2023-07-13 NOTE — PROGRESS NOTE ADULT - SUBJECTIVE AND OBJECTIVE BOX
REASON FOR VISIT: CHF    HPI:  67 y/o male with PMH of HFpEF, paroxysmal atrial fibrillation s/p Watchman, mod MR, COPD on 4 L of home O2,  ETOH induced cirrhosis, Hx of DT s/p trach now decannulated, HTN, obesity, chronic resp failure on home O2 4L, VIJAY (noncompliant w/ CPAP), alcoholism, severe pulmonary HTN, Left BKA, PAD, presents to  with 1 day history of difficulty breathing, productive cough, wheezing. Recent admission for COPD exacerbation with CHF, and has been having sob at home the past 1 day. He is in assisted living and drinks daily. He misses medications at times, and reports he "may have" missed lasix, which he was on 60mg TID up until recently. He has SOB with orthopnea. He also had significant improvement with IV lasix in ED. At time of evaluation he is pleasant and has a slight tremor with tongue fasciculations which his family says he does not do. ROS neg for CP, n/v/d ha vision changes, or foot pain.    Cardiology consulted for CHF. Pt. was recently admitted between 5/29-6/5/23 for decompensated HFpEF. Now with increasing dyspnea in the setting of non-compliance with meds - admits to skipping lasix and other meds - and ETOH abuse - admits to recent alcohol use.     7/10/23: feels better, Tele: NSR  7/11/23: Sitting up eating breakfast.  Denies cp.  SOB improving.  +productive cough.  Tele: afib 70's-80's  7/12/23: no cardiac complaints, Tele: Afib , no ectopy   7/13/23: no chest pain, SOB resolved, Tele: Afib rate controlled     MEDICATIONS  (STANDING):  aspirin enteric coated 81 milliGRAM(s) Oral daily  budesonide 160 MICROgram(s)/formoterol 4.5 MICROgram(s) Inhaler 2 Puff(s) Inhalation two times a day  busPIRone 10 milliGRAM(s) Oral two times a day  cholecalciferol 2000 Unit(s) Oral daily  cyanocobalamin 1000 MICROGram(s) Oral daily  diltiazem    milliGRAM(s) Oral daily  doxazosin 1 milliGRAM(s) Oral at bedtime  enoxaparin Injectable 40 milliGRAM(s) SubCutaneous every 24 hours  folic acid 1 milliGRAM(s) Oral daily  furosemide    Tablet 40 milliGRAM(s) Oral two times a day  gabapentin 400 milliGRAM(s) Oral three times a day  metoprolol tartrate 25 milliGRAM(s) Oral two times a day  multivitamin 1 Tablet(s) Oral daily  pantoprazole    Tablet 40 milliGRAM(s) Oral before breakfast  QUEtiapine 100 milliGRAM(s) Oral at bedtime  spironolactone 50 milliGRAM(s) Oral daily  tiotropium 2.5 MICROgram(s) Inhaler 2 Puff(s) Inhalation daily    MEDICATIONS  (PRN):  acetaminophen     Tablet .. 650 milliGRAM(s) Oral every 6 hours PRN Temp greater or equal to 38C (100.4F), Mild Pain (1 - 3)  albuterol    90 MICROgram(s) HFA Inhaler 2 Puff(s) Inhalation every 6 hours PRN Shortness of Breath and/or Wheezing  LORazepam     Tablet 1 milliGRAM(s) Oral every 2 hours PRN CIWA-Ar score increase by 2 points and a total score of 7 or less  LORazepam   Injectable 1 milliGRAM(s) IV Push every 1 hour PRN CIWA-Ar score 8 or greater    Vital Signs Last 24 Hrs  T(C): 36.6 (13 Jul 2023 08:23), Max: 36.6 (12 Jul 2023 20:41)  T(F): 97.8 (13 Jul 2023 08:23), Max: 97.9 (12 Jul 2023 20:41)  HR: 18 (13 Jul 2023 08:23) (18 - 99)  BP: 114/61 (13 Jul 2023 08:23) (92/75 - 130/71)  BP(mean): 82 (12 Jul 2023 14:47) (82 - 82)  RR: 18 (13 Jul 2023 08:23) (18 - 18)  SpO2: 97% (13 Jul 2023 08:23) (97% - 100%)    Parameters below as of 13 Jul 2023 08:23  Patient On (Oxygen Delivery Method): nasal cannula  O2 Flow (L/min): 4    PHYSICAL EXAM:  Constitutional: NAD, awake and alert  HEENT:  EOMI,  Pupils round, No oral cyanosis.  Pulmonary: Non-labored, breath sounds are clear bilaterally, No wheezing, rales or rhonchi  Cardiovascular: S1 and S2, regular rate and irreg rhythm, no Murmurs, gallops or rubs  Gastrointestinal: Bowel Sounds present, soft, nontender.   Ext: trace-1+ RLE edema; LBKA.  Neurological: Alert, no focal deficits  Skin: No rashes.  Psych:  Mood & affect appropriate    LABS: Reviewed   07-13    134<L>  |  96  |  13  ----------------------------<  126<H>  3.5   |  36<H>  |  0.81    Ca    8.9      13 Jul 2023 07:09  Phos  3.5     07-12  Mg     1.8     07-12      - TroponinI hsT: <-5.72, <-7.54    TroponinI hsT: <-5.72, <-7.54    CARDIAC MARKERS ( 09 Jul 2023 07:07 )x     / x     / 24 U/L / x     / x          TTE on 5/30/23 - copy in pt's chart, not available in [place]  Echo with preserved LVEF 60%, moderate MR, mild to moderate TR     EKG afib rate 101 with normal axis intervals    Xray Chest 1 View- PORTABLE-Urgent (07.08.23 @ 11:51):  The lungs imaged increased pulmonary vascular congestion. No gross consolidation is seen. No pleural effusion is seen. The heart is enlarged.  IMPRESSION: Cardiomegaly. Increased pulmonary vascular congestion noted.

## 2023-07-13 NOTE — PROGRESS NOTE ADULT - SUBJECTIVE AND OBJECTIVE BOX
Subjective:    Improving, denies any chest pain or dyspnea     Medications:  acetaminophen     Tablet .. 650 milliGRAM(s) Oral every 6 hours PRN  albuterol    90 MICROgram(s) HFA Inhaler 2 Puff(s) Inhalation every 6 hours PRN  aspirin enteric coated 81 milliGRAM(s) Oral daily  budesonide 160 MICROgram(s)/formoterol 4.5 MICROgram(s) Inhaler 2 Puff(s) Inhalation two times a day  busPIRone 10 milliGRAM(s) Oral two times a day  cholecalciferol 2000 Unit(s) Oral daily  cyanocobalamin 1000 MICROGram(s) Oral daily  diltiazem    milliGRAM(s) Oral daily  doxazosin 1 milliGRAM(s) Oral at bedtime  enoxaparin Injectable 40 milliGRAM(s) SubCutaneous every 24 hours  folic acid 1 milliGRAM(s) Oral daily  furosemide    Tablet 40 milliGRAM(s) Oral two times a day  gabapentin 400 milliGRAM(s) Oral three times a day  hemorrhoidal Ointment 1 Application(s) Rectal daily PRN  metoprolol tartrate 25 milliGRAM(s) Oral two times a day  multivitamin 1 Tablet(s) Oral daily  pantoprazole    Tablet 40 milliGRAM(s) Oral before breakfast  QUEtiapine 100 milliGRAM(s) Oral at bedtime  spironolactone 50 milliGRAM(s) Oral daily  tiotropium 2.5 MICROgram(s) Inhaler 2 Puff(s) Inhalation daily      Physical Exam:    Vitals:  Vital Signs Last 24 Hours  T(C): 36.6 (23 @ 08:23), Max: 36.6 (23 @ 20:41)  HR: 18 (23 @ 08:23) (18 - 99)  BP: 114/61 (23 @ 08:23) (92/75 - 130/71)  RR: 18 (23 @ 08:23) (18 - 18)  SpO2: 97% (23 @ 08:23) (97% - 100%)    Weight in k.5 ( @ 06:21)    I&O's Summary    2023 07:01  -  2023 07:00  --------------------------------------------------------  IN: 500 mL / OUT: 1300 mL / NET: -800 mL    2023 07:01  -  2023 12:48  --------------------------------------------------------  IN: 0 mL / OUT: 2000 mL / NET: -2000 mL        Tele: Afib 70s-90s with occasional PVCs     General: No distress. Comfortable.  HEENT: EOM intact.  Neck: Neck supple. JVP not elevated. No masses  Chest: Clear to auscultation bilaterally  CV: Normal S1 and S2. No murmurs, rub, or gallops. Radial pulses normal.  Abdomen: Soft, non-distended, non-tender  Skin: No rashes or skin breakdown  Extremities:  Warm, trace LE edema   Neurology: Alert and oriented times three. Sensation intact  Psych: Affect normal    Labs:        134<L>  |  96  |  13  ----------------------------<  126<H>  3.5   |  36<H>  |  0.81    Ca    8.9      2023 07:09  Phos  3.5     07-12  Mg     1.8     07-12      PT/INR - ( 2023 06:21 )   PT: 12.0 sec;   INR: 1.03 ratio         PTT - ( 2023 06:21 )  PTT:29.1 sec

## 2023-07-13 NOTE — PROGRESS NOTE ADULT - ASSESSMENT
67 y/o male FpEF with a hx of HTN, pHTN paroxysmal atrial fibrillation s/p Watchman, mod MR, PAD s/p L BKA,  COPD (on 4 L of home O2),  ETOH induced cirrhosis, trach, who presents to  on 7/8 with 1 day history of difficulty breathing, productive cough, wheezing. Appears overloaded on exam with normal BPs     TTE 5/30/23:  EF 60-65%, LVEDd 6.16 IVS 1.33 PWd 1.32 LA Moderately dilated, moderate MR, mild to moderate TR, RSVP 33.

## 2023-07-13 NOTE — PROGRESS NOTE ADULT - SUBJECTIVE AND OBJECTIVE BOX
HOSPITALIST ATTENDING PROGRESS NOTE    Chart and meds reviewed.  Patient seen and examined.    CC: CHF exac    Subjective: Coughing during my eval but resp stable when seen by CHF team.     All other systems reviewed and found to be negative with the exception of what has been described above.    MEDICATIONS  (STANDING):  aspirin enteric coated 81 milliGRAM(s) Oral daily  budesonide 160 MICROgram(s)/formoterol 4.5 MICROgram(s) Inhaler 2 Puff(s) Inhalation two times a day  busPIRone 10 milliGRAM(s) Oral two times a day  cholecalciferol 2000 Unit(s) Oral daily  cyanocobalamin 1000 MICROGram(s) Oral daily  diltiazem    milliGRAM(s) Oral daily  doxazosin 1 milliGRAM(s) Oral at bedtime  enoxaparin Injectable 40 milliGRAM(s) SubCutaneous every 24 hours  folic acid 1 milliGRAM(s) Oral daily  furosemide    Tablet 40 milliGRAM(s) Oral two times a day  gabapentin 400 milliGRAM(s) Oral three times a day  metoprolol tartrate 25 milliGRAM(s) Oral two times a day  multivitamin 1 Tablet(s) Oral daily  pantoprazole    Tablet 40 milliGRAM(s) Oral before breakfast  QUEtiapine 100 milliGRAM(s) Oral at bedtime  spironolactone 50 milliGRAM(s) Oral daily  tiotropium 2.5 MICROgram(s) Inhaler 2 Puff(s) Inhalation daily    MEDICATIONS  (PRN):  acetaminophen     Tablet .. 650 milliGRAM(s) Oral every 6 hours PRN Temp greater or equal to 38C (100.4F), Mild Pain (1 - 3)  albuterol    90 MICROgram(s) HFA Inhaler 2 Puff(s) Inhalation every 6 hours PRN Shortness of Breath and/or Wheezing  hemorrhoidal Ointment 1 Application(s) Rectal daily PRN hemorrhoid      VITALS:  T(F): 98.9 (07-13-23 @ 20:56), Max: 98.9 (07-13-23 @ 20:56)  HR: 97 (07-13-23 @ 20:56) (18 - 97)  BP: 119/69 (07-13-23 @ 20:56) (92/75 - 119/69)  RR: 18 (07-13-23 @ 20:56) (18 - 18)  SpO2: 99% (07-13-23 @ 20:56) (97% - 99%)  Wt(kg): --    I&O's Summary    12 Jul 2023 07:01  -  13 Jul 2023 07:00  --------------------------------------------------------  IN: 500 mL / OUT: 1300 mL / NET: -800 mL    13 Jul 2023 07:01  -  13 Jul 2023 22:07  --------------------------------------------------------  IN: 0 mL / OUT: 2000 mL / NET: -2000 mL        CAPILLARY BLOOD GLUCOSE          PHYSICAL EXAM:  Gen: NAD  HEENT:  pupils equal and reactive, EOMI, no oropharyngeal lesions, erythema, exudates, oral thrush  NECK:   supple, no carotid bruits, no palpable lymph nodes, no thyromegaly  CV:  +S1, +S2, regular, no murmurs or rubs  RESP:   lungs clear to auscultation bilaterally, no wheezing, rales, rhonchi, good air entry bilaterally  BREAST:  not examined  GI:  abdomen soft, non-tender, non-distended, normal BS, no bruits, no abdominal masses, no palpable masses  RECTAL:  not examined  :  not examined  MSK:   normal muscle tone, no atrophy, no rigidity, no contractions  EXT:  no clubbing, no cyanosis, + LE edema, no calf pain, swelling or erythema, +amputation  VASCULAR:  pulses equal and symmetric in the upper and lower extremities  NEURO:  AAOX3, no focal neurological deficits, follows all commands, able to move extremities spontaneously  SKIN:  no ulcers, lesions or rashes    LABS:        07-13    134<L>  |  96  |  13  ----------------------------<  126<H>  3.5   |  36<H>  |  0.81    Ca    8.9      13 Jul 2023 07:09  Phos  3.5     07-12  Mg     1.8     07-12            PT/INR - ( 12 Jul 2023 06:21 )   PT: 12.0 sec;   INR: 1.03 ratio         PTT - ( 12 Jul 2023 06:21 )  PTT:29.1 sec  Urinalysis Basic - ( 13 Jul 2023 07:09 )    Color: x / Appearance: x / SG: x / pH: x  Gluc: 126 mg/dL / Ketone: x  / Bili: x / Urobili: x   Blood: x / Protein: x / Nitrite: x   Leuk Esterase: x / RBC: x / WBC x   Sq Epi: x / Non Sq Epi: x / Bacteria: x    Micro:  COVID19 PCR negative  Flu PCR negative  RSV PCR negative    Cardiac Testing:  PYP study requested but patient unable to tolerate as having trouble laying flay    TTE ordered, cancelled by performing department as he had a TTE 5/30/23 but it is not appearing in [place], see below    EKG 7/8: Afib, rate 101, normal axis, normal intervals    Prior visit cardiac testing:  TTE 5/30/23:  EF 60-65%, LVEDd 6.16 IVS 1.33 PWd 1.32 LA Moderately dilated, moderate MR, mild to moderate TR, RSVP 33.     Imaging:  CXR 7/8: The lungs imaged increased pulmonary vascular congestion. No gross consolidation is seen. No pleural effusion is seen. The heart is enlarged.    Telemetry, personally reviewed:  7/13/23:  afib 70-90, PVCs, d/c tele

## 2023-07-13 NOTE — PROGRESS NOTE ADULT - SUBJECTIVE AND OBJECTIVE BOX
Subjective:    Awake, Comfortable at rest.    MEDICATIONS  (STANDING):  aspirin enteric coated 81 milliGRAM(s) Oral daily  budesonide 160 MICROgram(s)/formoterol 4.5 MICROgram(s) Inhaler 2 Puff(s) Inhalation two times a day  busPIRone 10 milliGRAM(s) Oral two times a day  cholecalciferol 2000 Unit(s) Oral daily  cyanocobalamin 1000 MICROGram(s) Oral daily  diltiazem    milliGRAM(s) Oral daily  doxazosin 1 milliGRAM(s) Oral at bedtime  enoxaparin Injectable 40 milliGRAM(s) SubCutaneous every 24 hours  folic acid 1 milliGRAM(s) Oral daily  furosemide   Injectable 80 milliGRAM(s) IV Push two times a day  gabapentin 400 milliGRAM(s) Oral three times a day  metoprolol tartrate 25 milliGRAM(s) Oral two times a day  multivitamin 1 Tablet(s) Oral daily  pantoprazole    Tablet 40 milliGRAM(s) Oral before breakfast  QUEtiapine 100 milliGRAM(s) Oral at bedtime  spironolactone 50 milliGRAM(s) Oral daily  tiotropium 2.5 MICROgram(s) Inhaler 2 Puff(s) Inhalation daily    MEDICATIONS  (PRN):  acetaminophen     Tablet .. 650 milliGRAM(s) Oral every 6 hours PRN Temp greater or equal to 38C (100.4F), Mild Pain (1 - 3)  albuterol    90 MICROgram(s) HFA Inhaler 2 Puff(s) Inhalation every 6 hours PRN Shortness of Breath and/or Wheezing  hemorrhoidal Ointment 1 Application(s) Rectal daily PRN hemorrhoid      Allergies    Ceclor (Rash)  Duricef (Rash)    Intolerances        Vital Signs Last 24 Hrs  T(C): 36.6 (12 Jul 2023 20:41), Max: 36.6 (12 Jul 2023 20:41)  T(F): 97.9 (12 Jul 2023 20:41), Max: 97.9 (12 Jul 2023 20:41)  HR: 84 (13 Jul 2023 05:55) (84 - 99)  BP: 92/75 (13 Jul 2023 05:55) (92/75 - 130/71)  BP(mean): 82 (12 Jul 2023 14:47) (82 - 82)  RR: 18 (13 Jul 2023 05:55) (18 - 18)  SpO2: 97% (13 Jul 2023 05:55) (95% - 100%)    Parameters below as of 13 Jul 2023 05:55  Patient On (Oxygen Delivery Method): nasal cannula  O2 Flow (L/min): 4      PHYSICAL EXAMINATION:    NECK:  Supple. No lymphadenopathy. Jugular venous pressure not elevated.  HEART:   The cardiac impulse has a normal quality. Irreg., Nl S1 and S2.    CHEST:  Chest with bibasilar crackles. Normal respiratory effort.  ABDOMEN:  Soft and nontender.   EXTREMITIES:  There is 1-2+ chronic edema.       LABS:    07-13    134<L>  |  96  |  13  ----------------------------<  126<H>  3.5   |  36<H>  |  0.81    Ca    8.9      13 Jul 2023 07:09  Phos  3.5     07-12  Mg     1.8     07-12      PT/INR - ( 12 Jul 2023 06:21 )   PT: 12.0 sec;   INR: 1.03 ratio         PTT - ( 12 Jul 2023 06:21 )  PTT:29.1 sec  Urinalysis Basic - ( 13 Jul 2023 07:09 )    Color: x / Appearance: x / SG: x / pH: x  Gluc: 126 mg/dL / Ketone: x  / Bili: x / Urobili: x   Blood: x / Protein: x / Nitrite: x   Leuk Esterase: x / RBC: x / WBC x   Sq Epi: x / Non Sq Epi: x / Bacteria: x        RADIOLOGY & ADDITIONAL TESTS:    Assessment/Plan:    - Diurese as darryl.-on lasix 80MG IV BID as per cardiology  - Low salt diet  - Aerosols with Symbicort. Spiriva   - BiPAP nocturnally  - Follow BUN/Cr-stable  - Monitor O2 Sats  - Daily weights      Problem/Plan - 1:  ·  Problem: Acute hypoxemic respiratory failure.   Problem/Plan - 2:  ·  Problem: Acute on chronic diastolic congestive heart failure.   Problem/Plan - 3:  ·  Problem: Pleural effusion.   Problem/Plan - 4:  ·  Problem: COPD, moderate.   Problem/Plan - 5:  ·  Problem: Chronic atrial fibrillation.

## 2023-07-14 ENCOUNTER — TRANSCRIPTION ENCOUNTER (OUTPATIENT)
Age: 68
End: 2023-07-14

## 2023-07-14 VITALS — OXYGEN SATURATION: 96 %

## 2023-07-14 LAB
ANION GAP SERPL CALC-SCNC: 5 MMOL/L — SIGNIFICANT CHANGE UP (ref 5–17)
BUN SERPL-MCNC: 13 MG/DL — SIGNIFICANT CHANGE UP (ref 7–23)
CALCIUM SERPL-MCNC: 8.5 MG/DL — SIGNIFICANT CHANGE UP (ref 8.5–10.1)
CHLORIDE SERPL-SCNC: 96 MMOL/L — SIGNIFICANT CHANGE UP (ref 96–108)
CO2 SERPL-SCNC: 29 MMOL/L — SIGNIFICANT CHANGE UP (ref 22–31)
CREAT SERPL-MCNC: 0.69 MG/DL — SIGNIFICANT CHANGE UP (ref 0.5–1.3)
EGFR: 102 ML/MIN/1.73M2 — SIGNIFICANT CHANGE UP
GLUCOSE SERPL-MCNC: 105 MG/DL — HIGH (ref 70–99)
HCT VFR BLD CALC: 32.6 % — LOW (ref 39–50)
HGB BLD-MCNC: 11 G/DL — LOW (ref 13–17)
MCHC RBC-ENTMCNC: 31.2 PG — SIGNIFICANT CHANGE UP (ref 27–34)
MCHC RBC-ENTMCNC: 33.7 GM/DL — SIGNIFICANT CHANGE UP (ref 32–36)
MCV RBC AUTO: 92.4 FL — SIGNIFICANT CHANGE UP (ref 80–100)
PLATELET # BLD AUTO: 174 K/UL — SIGNIFICANT CHANGE UP (ref 150–400)
POTASSIUM SERPL-MCNC: 3.7 MMOL/L — SIGNIFICANT CHANGE UP (ref 3.5–5.3)
POTASSIUM SERPL-SCNC: 3.7 MMOL/L — SIGNIFICANT CHANGE UP (ref 3.5–5.3)
RBC # BLD: 3.53 M/UL — LOW (ref 4.2–5.8)
RBC # FLD: 15 % — HIGH (ref 10.3–14.5)
SODIUM SERPL-SCNC: 130 MMOL/L — LOW (ref 135–145)
TSH SERPL-MCNC: 0.79 UU/ML — SIGNIFICANT CHANGE UP (ref 0.34–4.82)
WBC # BLD: 11.93 K/UL — HIGH (ref 3.8–10.5)
WBC # FLD AUTO: 11.93 K/UL — HIGH (ref 3.8–10.5)

## 2023-07-14 PROCEDURE — 99233 SBSQ HOSP IP/OBS HIGH 50: CPT

## 2023-07-14 PROCEDURE — 99232 SBSQ HOSP IP/OBS MODERATE 35: CPT

## 2023-07-14 PROCEDURE — 99239 HOSP IP/OBS DSCHRG MGMT >30: CPT

## 2023-07-14 PROCEDURE — 78800 RP LOCLZJ TUM 1 AREA 1 D IMG: CPT | Mod: 26

## 2023-07-14 RX ORDER — DOXAZOSIN MESYLATE 4 MG
1 TABLET ORAL
Qty: 30 | Refills: 0
Start: 2023-07-14 | End: 2023-08-12

## 2023-07-14 RX ORDER — DILTIAZEM HCL 120 MG
1 CAPSULE, EXT RELEASE 24 HR ORAL
Refills: 0 | DISCHARGE

## 2023-07-14 RX ORDER — FOLIC ACID 0.8 MG
1 TABLET ORAL
Qty: 30 | Refills: 0
Start: 2023-07-14 | End: 2023-08-12

## 2023-07-14 RX ORDER — DOXAZOSIN MESYLATE 4 MG
1 TABLET ORAL
Refills: 0 | DISCHARGE

## 2023-07-14 RX ORDER — DAPAGLIFLOZIN 10 MG/1
10 TABLET, FILM COATED ORAL DAILY
Refills: 0 | Status: DISCONTINUED | OUTPATIENT
Start: 2023-07-14 | End: 2023-07-14

## 2023-07-14 RX ORDER — SPIRONOLACTONE 25 MG/1
1 TABLET, FILM COATED ORAL
Qty: 0 | Refills: 0 | DISCHARGE

## 2023-07-14 RX ORDER — SPIRONOLACTONE 25 MG/1
2 TABLET, FILM COATED ORAL
Qty: 60 | Refills: 0
Start: 2023-07-14 | End: 2023-08-12

## 2023-07-14 RX ORDER — DILTIAZEM HCL 120 MG
1 CAPSULE, EXT RELEASE 24 HR ORAL
Qty: 30 | Refills: 0
Start: 2023-07-14 | End: 2023-08-12

## 2023-07-14 RX ORDER — TIOTROPIUM BROMIDE 18 UG/1
2 CAPSULE ORAL; RESPIRATORY (INHALATION)
Qty: 1 | Refills: 0
Start: 2023-07-14

## 2023-07-14 RX ORDER — PREGABALIN 225 MG/1
1 CAPSULE ORAL
Qty: 30 | Refills: 0
Start: 2023-07-14 | End: 2023-08-12

## 2023-07-14 RX ORDER — FUROSEMIDE 40 MG
1 TABLET ORAL
Refills: 0 | DISCHARGE

## 2023-07-14 RX ORDER — FUROSEMIDE 40 MG
1 TABLET ORAL
Qty: 60 | Refills: 0
Start: 2023-07-14 | End: 2023-08-12

## 2023-07-14 RX ADMIN — Medication 40 MILLIGRAM(S): at 05:33

## 2023-07-14 RX ADMIN — Medication 650 MILLIGRAM(S): at 00:37

## 2023-07-14 RX ADMIN — Medication 2000 UNIT(S): at 11:13

## 2023-07-14 RX ADMIN — BUDESONIDE AND FORMOTEROL FUMARATE DIHYDRATE 2 PUFF(S): 160; 4.5 AEROSOL RESPIRATORY (INHALATION) at 09:41

## 2023-07-14 RX ADMIN — DAPAGLIFLOZIN 10 MILLIGRAM(S): 10 TABLET, FILM COATED ORAL at 11:12

## 2023-07-14 RX ADMIN — Medication 81 MILLIGRAM(S): at 11:12

## 2023-07-14 RX ADMIN — SPIRONOLACTONE 50 MILLIGRAM(S): 25 TABLET, FILM COATED ORAL at 11:13

## 2023-07-14 RX ADMIN — Medication 10 MILLIGRAM(S): at 11:13

## 2023-07-14 RX ADMIN — PREGABALIN 1000 MICROGRAM(S): 225 CAPSULE ORAL at 11:16

## 2023-07-14 RX ADMIN — Medication 25 MILLIGRAM(S): at 05:33

## 2023-07-14 RX ADMIN — Medication 40 MILLIGRAM(S): at 13:51

## 2023-07-14 RX ADMIN — GABAPENTIN 400 MILLIGRAM(S): 400 CAPSULE ORAL at 13:51

## 2023-07-14 RX ADMIN — GABAPENTIN 400 MILLIGRAM(S): 400 CAPSULE ORAL at 05:33

## 2023-07-14 RX ADMIN — Medication 180 MILLIGRAM(S): at 11:12

## 2023-07-14 RX ADMIN — Medication 1 MILLIGRAM(S): at 11:13

## 2023-07-14 RX ADMIN — PANTOPRAZOLE SODIUM 40 MILLIGRAM(S): 20 TABLET, DELAYED RELEASE ORAL at 05:34

## 2023-07-14 RX ADMIN — TIOTROPIUM BROMIDE 2 PUFF(S): 18 CAPSULE ORAL; RESPIRATORY (INHALATION) at 09:41

## 2023-07-14 RX ADMIN — Medication 1 TABLET(S): at 11:13

## 2023-07-14 RX ADMIN — ENOXAPARIN SODIUM 40 MILLIGRAM(S): 100 INJECTION SUBCUTANEOUS at 11:12

## 2023-07-14 NOTE — PROGRESS NOTE ADULT - PROBLEM SELECTOR PLAN 3
·  Problem: ETOH abuse.   ·  Recommendation: pt. with long Hx of ETOH abuse, non compliance with meds, multiple admissions, admits to recent drinking, on CIWA protocol, management as per Hospitalist.

## 2023-07-14 NOTE — PROGRESS NOTE ADULT - REASON FOR ADMISSION
CHF exacerbation
chf exacerbation with etoh withdrawal
CHF exacerbation
CHF exacerbation  Alcohol use disorder with alcohol withdrawal
CHF exacerbation  Alcohol use disorder with alcohol withdrawal
chf exacerbation with etoh withdrawal

## 2023-07-14 NOTE — PROGRESS NOTE ADULT - PROBLEM SELECTOR PROBLEM 2
Permanent atrial fibrillation
Acute on chronic diastolic congestive heart failure
Permanent atrial fibrillation
Chronic atrial fibrillation
Permanent atrial fibrillation

## 2023-07-14 NOTE — PROGRESS NOTE ADULT - NS ATTEND AMEND GEN_ALL_CORE FT
68 year old male with obesity COPD , chronic diastolic heart failure with worsening fo shortness of breath likely non adherent medication ,   continue IV lasix , encourage patient to continue medication ,
Clinically improved; case discussed with CELSO Hollis, MERRILL Bramblia, and Dr Hackett.  Continue present management.
Patient seen and examined; recurrent exacerbations of HF in setting or Rx non-adherence and alcoholism; case discussed with CELSO Patel and MERRILL Brambila; management as described above.
68 year old male with obesity COPD , chronic diastolic heart failure with worsening fo shortness of breath likely non adherent medication ,   continue IV lasix , encourage patient to continue medication ,
Case discussed with CELSO Patel and MERRILL Brambila; continue management as described above

## 2023-07-14 NOTE — PROGRESS NOTE ADULT - SUBJECTIVE AND OBJECTIVE BOX
Subjective:    Awake, alert. Less dyspnea    MEDICATIONS  (STANDING):  aspirin enteric coated 81 milliGRAM(s) Oral daily  budesonide 160 MICROgram(s)/formoterol 4.5 MICROgram(s) Inhaler 2 Puff(s) Inhalation two times a day  busPIRone 10 milliGRAM(s) Oral two times a day  cholecalciferol 2000 Unit(s) Oral daily  cyanocobalamin 1000 MICROGram(s) Oral daily  diltiazem    milliGRAM(s) Oral daily  doxazosin 1 milliGRAM(s) Oral at bedtime  enoxaparin Injectable 40 milliGRAM(s) SubCutaneous every 24 hours  folic acid 1 milliGRAM(s) Oral daily  furosemide    Tablet 40 milliGRAM(s) Oral two times a day  gabapentin 400 milliGRAM(s) Oral three times a day  metoprolol tartrate 25 milliGRAM(s) Oral two times a day  multivitamin 1 Tablet(s) Oral daily  pantoprazole    Tablet 40 milliGRAM(s) Oral before breakfast  QUEtiapine 100 milliGRAM(s) Oral at bedtime  spironolactone 50 milliGRAM(s) Oral daily  tiotropium 2.5 MICROgram(s) Inhaler 2 Puff(s) Inhalation daily    MEDICATIONS  (PRN):  acetaminophen     Tablet .. 650 milliGRAM(s) Oral every 6 hours PRN Temp greater or equal to 38C (100.4F), Mild Pain (1 - 3)  albuterol    90 MICROgram(s) HFA Inhaler 2 Puff(s) Inhalation every 6 hours PRN Shortness of Breath and/or Wheezing  hemorrhoidal Ointment 1 Application(s) Rectal daily PRN hemorrhoid      Allergies    Ceclor (Rash)  Duricef (Rash)    Intolerances        Vital Signs Last 24 Hrs  T(C): 37.2 (13 Jul 2023 20:56), Max: 37.2 (13 Jul 2023 20:56)  T(F): 98.9 (13 Jul 2023 20:56), Max: 98.9 (13 Jul 2023 20:56)  HR: 63 (14 Jul 2023 05:00) (63 - 97)  BP: 100/45 (14 Jul 2023 05:00) (100/45 - 119/69)  BP(mean): --  RR: 18 (13 Jul 2023 20:56) (18 - 18)  SpO2: 99% (13 Jul 2023 20:56) (99% - 99%)    Parameters below as of 13 Jul 2023 20:56  Patient On (Oxygen Delivery Method): nasal cannula  O2 Flow (L/min): 4      PHYSICAL EXAMINATION:    NECK:  Supple. No lymphadenopathy. Jugular venous pressure not elevated.    HEART:   The cardiac impulse has a normal quality. Irreg., Nl S1 and S2.    CHEST:  Chest is clear to auscultation in upper zones, bibasilar crackles. Normal respiratory effort.  ABDOMEN:  Soft and nontender.   EXTREMITIES:  There is 1+ edema with chronic changes.       LABS:                        11.0   11.93 )-----------( 174      ( 14 Jul 2023 06:47 )             32.6     07-14    130<L>  |  96  |  13  ----------------------------<  105<H>  3.7   |  29  |  0.69    Ca    8.5      14 Jul 2023 06:47        Urinalysis Basic - ( 14 Jul 2023 06:47 )    Color: x / Appearance: x / SG: x / pH: x  Gluc: 105 mg/dL / Ketone: x  / Bili: x / Urobili: x   Blood: x / Protein: x / Nitrite: x   Leuk Esterase: x / RBC: x / WBC x   Sq Epi: x / Non Sq Epi: x / Bacteria: x        RADIOLOGY & ADDITIONAL TESTS:    Assessment/Plan:    - Diurese as darryl.-on lasix 40MG PO BID as per cardiology  - Low salt diet  - Aerosols with Symbicort. Spiriva   - BiPAP nocturnally  - Follow BUN/Cr-stable. Na is decreased  - Monitor O2 Sats  - Daily weights      Problem/Plan - 1:  ·  Problem: Acute hypoxemic respiratory failure.   Problem/Plan - 2:  ·  Problem: Acute on chronic diastolic congestive heart failure.   Problem/Plan - 3:  ·  Problem: Pleural effusion.   Problem/Plan - 4:  ·  Problem: COPD, moderate.   Problem/Plan - 5:  ·  Problem: Chronic atrial fibrillation.

## 2023-07-14 NOTE — GOALS OF CARE CONVERSATION - ADVANCED CARE PLANNING - CONVERSATION DETAILS
Pt reports he is DNR/DNI. Would not want chest compressions, defib or intubation. Daughter separately reported that that is likely what he would want. ELAINE completed.

## 2023-07-14 NOTE — DISCHARGE NOTE PROVIDER - NSDCCAREPROVSEEN_GEN_ALL_CORE_FT
Tien King (cardiology)  Rasheeda Hackett (hospital medicine)  Wicho Mckenna (pulmonology)  Barry Brambila (CHF)  Brad Neff (SLP)

## 2023-07-14 NOTE — DISCHARGE NOTE PROVIDER - NSDCPNSUBOBJ_GEN_ALL_CORE
VITALS:  T(F): 97.4 (07-14-23 @ 08:21), Max: 98.9 (07-13-23 @ 20:56)  HR: 79 (07-14-23 @ 09:41) (63 - 97)  BP: 102/53 (07-14-23 @ 08:21) (100/45 - 119/69)  RR: 18 (07-14-23 @ 08:21) (18 - 18)  SpO2: 96% (07-14-23 @ 09:41) (96% - 99%)    PHYSICAL EXAM:  Gen: NAD  HEENT:  pupils equal and reactive, EOMI, no oropharyngeal lesions, erythema, exudates, oral thrush  NECK:   supple, no carotid bruits, no palpable lymph nodes, no thyromegaly  CV:  +S1, +S2, regular, no murmurs or rubs  RESP:   lungs clear to auscultation bilaterally, no wheezing, rales, rhonchi, good air entry bilaterally  BREAST:  not examined  GI:  abdomen soft, non-tender, non-distended, normal BS, no bruits, no abdominal masses, no palpable masses  RECTAL:  not examined  :  not examined  MSK:   normal muscle tone, no atrophy, no rigidity, no contractions  EXT:  no clubbing, no cyanosis, no edema, no calf pain, swelling or erythema, +amputation  VASCULAR:  pulses equal and symmetric in the upper and lower extremities  NEURO:  AAOX3, no focal neurological deficits, follows all commands, able to move extremities spontaneously  SKIN:  no ulcers, lesions or rashes    COVID19 PCR negative  Flu PCR negative  RSV PCR negative    Cardiac Testing:  PYP study requested but patient unable to tolerate as having trouble laying flay    TTE ordered, cancelled by performing department as he had a TTE 5/30/23 but it is not appearing in [place], see below    EKG 7/8: Afib, rate 101, normal axis, normal intervals    Prior visit cardiac testing:  TTE 5/30/23:  EF 60-65%, LVEDd 6.16 IVS 1.33 PWd 1.32 LA Moderately dilated, moderate MR, mild to moderate TR, RSVP 33.     Imaging:  CXR 7/8: The lungs imaged increased pulmonary vascular congestion. No gross consolidation is seen. No pleural effusion is seen. The heart is enlarged.

## 2023-07-14 NOTE — DISCHARGE NOTE PROVIDER - NSDCMRMEDTOKEN_GEN_ALL_CORE_FT
albuterol 90 mcg/inh inhalation aerosol: 2 puff(s) inhaled every 4 hours, As Needed for wheezing  Aspirin Enteric Coated 81 mg oral delayed release tablet: 1 tab(s) orally once a day  budesonide-formoterol 160 mcg-4.5 mcg/inh inhalation aerosol: 2 puff(s) inhaled 2 times a day  busPIRone 10 mg oral tablet: 1 tab(s) orally 2 times a day  cholecalciferol 25 mcg (1000 intl units) oral tablet: 1 tab(s) orally once a day  cyanocobalamin 1000 mcg oral tablet: 1 tab(s) orally once a day  dilTIAZem 180 mg/24 hours oral capsule, extended release: 1 cap(s) orally once a day  doxazosin 1 mg oral tablet: 1 tab(s) orally once a day (at bedtime)  Farxiga 10 mg oral tablet: 1 tab(s) orally once a day  folic acid 1 mg oral tablet: 1 tab(s) orally once a day  furosemide 40 mg oral tablet: 1 tab(s) orally 2 times a day  gabapentin 400 mg oral capsule: 1 cap(s) orally 3 times a day  Metoprolol Tartrate 25 mg oral tablet: 1 tab(s) orally 2 times a day  Multiple Vitamins oral tablet: 1 tab(s) orally once a day  omeprazole 40 mg oral delayed release capsule: 1 cap(s) orally once a day  QUEtiapine 50 mg oral tablet: 2 tab(s) orally once a day (at bedtime)  spironolactone 25 mg oral tablet: 2 tab(s) orally once a day  tiotropium 2.5 mcg/inh inhalation aerosol: 2 puff(s) inhaled once a day

## 2023-07-14 NOTE — PROGRESS NOTE ADULT - PROBLEM SELECTOR PLAN 2
·  Problem: Permanent atrial fibrillation.   ·  Recommendation: s/p Watchman, no AC due to GIB  Recommendation: cont. cardizem  mg po daily, cont. BB
·  Problem: Permanent atrial fibrillation.   ·  Recommendation: s/p Watchman, no AC due to GIB  Recommendation: cont. cardizem  mg po daily, cont. BB
- on diltiazem 120 mg po daily  - on metoprolol tartrate 25 mg po BID  - Has watchman device.
·  Problem: Permanent atrial fibrillation.   ·  Recommendation: s/p Watchman, no AC due to GIB, now with NSR   Recommendation: cont. cardizem  mg po daily, cont. BB - can titrate up for rate control if needed, tele monitor.
·  Problem: Permanent atrial fibrillation.   ·  Recommendation: s/p Watchman, no AC due to GIB  Recommendation: cont. cardizem  mg po daily, cont. BB
·  Problem: Permanent atrial fibrillation.   ·  Recommendation: s/p Watchman, no AC due to GIB  Recommendation: cont. cardizem  mg po daily, cont. BB

## 2023-07-14 NOTE — DISCHARGE NOTE PROVIDER - PROVIDER TOKENS
PROVIDER:[TOKEN:[[id number]],FOLLOWUP:[1 week]],PROVIDER:[TOKEN:[[id number]],FOLLOWUP:[1 month]],PROVIDER:[TOKEN:[[id number]],FOLLOWUP:[1 month]]

## 2023-07-14 NOTE — PROGRESS NOTE ADULT - PROVIDER SPECIALTY LIST ADULT
Hospitalist
Hospitalist
Pulmonology
Pulmonology
Hospitalist
Pulmonology
Pulmonology
Cardiology
Hospitalist
Hospitalist
Cardiology
Heart Failure
Cardiology
Pulmonology
Cardiology
Cardiology

## 2023-07-14 NOTE — PROGRESS NOTE ADULT - NS ATTEND OPT1 GEN_ALL_CORE
I attest my time as attending is greater than 50% of the total combined time spent on qualifying patient care activities by the PA/NP and attending.
I independently performed the documented:
I attest my time as attending is greater than 50% of the total combined time spent on qualifying patient care activities by the PA/NP and attending.
I attest my time as attending is greater than 50% of the total combined time spent on qualifying patient care activities by the PA/NP and attending.
I independently performed the documented:

## 2023-07-14 NOTE — DISCHARGE NOTE PROVIDER - NSDCFUSCHEDAPPT_GEN_ALL_CORE_FT
Zahida Alcantar  [facility]  CARDIOLOGY [address]  Scheduled Appointment: 07/24/2023    Augusta Juarez  [facility]  FAMILYMED [address]  Scheduled Appointment: 08/24/2023    Dominick Middleton  [facility]  CARDIOLOGY [address]  Scheduled Appointment: 09/13/2023

## 2023-07-14 NOTE — DISCHARGE NOTE PROVIDER - CARE PROVIDER_API CALL
Barry Brambila  Physician Assistant Services  [address] [place] [address]  Phone: [phone]  Fax: [phone]  Follow Up Time: 1 week    Lili Prather  Transplant Hepatology  [address], [address]  [place] [address]  Phone: [phone]  Fax: [phone]  Follow Up Time: 1 month    Mitchell Barker  Gastroenterology  [address] [place] [address]  Phone: [phone]  Fax: [phone]  Follow Up Time: 1 month

## 2023-07-14 NOTE — PROGRESS NOTE ADULT - PROBLEM SELECTOR PLAN 1
·  Problem: HFpEF exacerbation, fluid overload due to noncompliance with meds and ETOH abuse.    ·  Recommendation: pt. with recent admission  5/29-6/5/23 for decompensated HFpEF. Now with increasing dyspnea   Recommendation: pt. with fluid overload, elevated pro [id number], previous TTE from  5/30/23 - copy in pt's chart, not available in Portal shows preserved LVEF 60%, moderate MR, mild to moderate TR   - HF team input appreciated.  continue lasix to 40 mg po bid, cont. spironalactone 50mg po daily, farxiga, Amyloid work up in progress.  proBNP greatly gctniqks=425 fom 1755.  Continue BB.    .  Continue low sodium diet, daily weights, fluid restriction 1.5L/day
- Volume status improving  - Start Farxiga 10 mg po daily tomorrow   - Decrease lasix to 40 mg PO BID  - Decrease spironolactone to 25 mg po daily  - amyloid workup: Kappa/Lambda ratio near normal, immunofixation pending.    - 2L fluid restriction  - strict I & Os  - Daily standing weights
·  Problem: Acute on chronic diastolic congestive heart failure.   ·  Recommendation: pt. with recent admission  5/29-6/5/23 for decompensated HFpEF. Now with increasing dyspnea   Recommendation: pt. with fluid overload, elevated pro [id number], previous TTE from  5/30/23 - copy in pt's chart, not available in [place] shows preserved LVEF 60%, moderate MR, mild to moderate TR   - HF team input appreciated.  IV lasix increased to 80mg BID, spironalactone increased to 50mg daily.  Amyloid work up recommended.  Recheck proBNP tomorrow.  Continue BB.  HFpEF exacerbation, fluid overload due to noncompliance with meds and ETOH abuse.    .  Continue low sodium diet, daily weights, fluid restriction 1.5L/day
·  Problem: HFpEF exacerbation, fluid overload due to noncompliance with meds and ETOH abuse.    ·  Recommendation: pt. with recent admission  5/29-6/5/23 for decompensated HFpEF. Now with increasing dyspnea   Recommendation: pt. with fluid overload, elevated pro [id number], previous TTE from  5/30/23 - copy in pt's chart, not available in [place] shows preserved LVEF 60%, moderate MR, mild to moderate TR   - HF team input appreciated.  change lasix to 40 mg po bid, cont. spironalactone 50mg po daily.  will start farxiga tomorrow, Amyloid work up recommended.  proBNP greatly vndndrtx=846 fom 1755.  Continue BB.    .  Continue low sodium diet, daily weights, fluid restriction 1.5L/day
·  Problem: Acute on chronic diastolic congestive heart failure.   ·  Recommendation: pt. with recent admission  5/29-6/5/23 for decompensated HFpEF. Now with increasing dyspnea   Recommendation: pt. with fluid overload, elevated pro [id number], previous TTE from  5/30/23 - copy in pt's chart, not available in [place] shows preserved LVEF 60%, moderate MR, mild to moderate TR   - now with HFpEF exacerbation, fluid overload due to noncompliance with meds and ETOH abuse,  check TTE, GDMT with BB/MRA, diuresis with lasix 40 ivp bid, daily weight, fluid restriction 1.5L/day, recheck proBNP after 48 hrs of diuresis, HF Team consulted.
·  Problem: HFpEF exacerbation, fluid overload due to noncompliance with meds and ETOH abuse.    ·  Recommendation: pt. with recent admission  5/29-6/5/23 for decompensated HFpEF. Now with increasing dyspnea   Recommendation: pt. with fluid overload, elevated pro [id number], previous TTE from  5/30/23 - copy in pt's chart, not available in [place] shows preserved LVEF 60%, moderate MR, mild to moderate TR   - HF team input appreciated.  IV lasix increased to 80mg BID, spironalactone increased to 50mg daily.  Amyloid work up recommended.  proBNP greatly egtpsxby=375 fom 1755.  Continue BB.    .  Continue low sodium diet, daily weights, fluid restriction 1.5L/day

## 2023-07-14 NOTE — PROGRESS NOTE ADULT - SUBJECTIVE AND OBJECTIVE BOX
REASON FOR VISIT: CHF    HPI:  69 y/o male with PMH of HFpEF, paroxysmal atrial fibrillation s/p Watchman, mod MR, COPD on 4 L of home O2,  ETOH induced cirrhosis, Hx of DT s/p trach now decannulated, HTN, obesity, chronic resp failure on home O2 4L, VIJAY (noncompliant w/ CPAP), alcoholism, severe pulmonary HTN, Left BKA, PAD, presents to  with 1 day history of difficulty breathing, productive cough, wheezing. Recent admission for COPD exacerbation with CHF, and has been having sob at home the past 1 day. He is in assisted living and drinks daily. He misses medications at times, and reports he "may have" missed lasix, which he was on 60mg TID up until recently. He has SOB with orthopnea. He also had significant improvement with IV lasix in ED. At time of evaluation he is pleasant and has a slight tremor with tongue fasciculations which his family says he does not do. ROS neg for CP, n/v/d ha vision changes, or foot pain.    Cardiology consulted for CHF. Pt. was recently admitted between 5/29-6/5/23 for decompensated HFpEF. Now with increasing dyspnea in the setting of non-compliance with meds - admits to skipping lasix and other meds - and ETOH abuse - admits to recent alcohol use.     7/10/23: feels better, Tele: NSR  7/11/23: Sitting up eating breakfast.  Denies cp.  SOB improving.  +productive cough.  Tele: afib 70's-80's  7/12/23: no cardiac complaints, Tele: Afib , no ectopy   7/13/23: no chest pain, SOB resolved, Tele: Afib rate controlled   7/14/23: Denies cp or sob.  Amyloid work up in progress.  Tele: afib 70's-90's with bradycardia noted this am HR 39 (asymptomatic)    MEDICATIONS  (STANDING):  aspirin enteric coated 81 milliGRAM(s) Oral daily  budesonide 160 MICROgram(s)/formoterol 4.5 MICROgram(s) Inhaler 2 Puff(s) Inhalation two times a day  busPIRone 10 milliGRAM(s) Oral two times a day  cholecalciferol 2000 Unit(s) Oral daily  cyanocobalamin 1000 MICROGram(s) Oral daily  dapagliflozin 10 milliGRAM(s) Oral daily  diltiazem    milliGRAM(s) Oral daily  doxazosin 1 milliGRAM(s) Oral at bedtime  enoxaparin Injectable 40 milliGRAM(s) SubCutaneous every 24 hours  folic acid 1 milliGRAM(s) Oral daily  furosemide    Tablet 40 milliGRAM(s) Oral two times a day  gabapentin 400 milliGRAM(s) Oral three times a day  metoprolol tartrate 25 milliGRAM(s) Oral two times a day  multivitamin 1 Tablet(s) Oral daily  pantoprazole    Tablet 40 milliGRAM(s) Oral before breakfast  QUEtiapine 100 milliGRAM(s) Oral at bedtime  spironolactone 50 milliGRAM(s) Oral daily  tiotropium 2.5 MICROgram(s) Inhaler 2 Puff(s) Inhalation daily    MEDICATIONS  (PRN):  acetaminophen     Tablet .. 650 milliGRAM(s) Oral every 6 hours PRN Temp greater or equal to 38C (100.4F), Mild Pain (1 - 3)  albuterol    90 MICROgram(s) HFA Inhaler 2 Puff(s) Inhalation every 6 hours PRN Shortness of Breath and/or Wheezing  hemorrhoidal Ointment 1 Application(s) Rectal daily PRN hemorrhoid  albuterol    90 MICROgram(s) HFA Inhaler 2 Puff(s) Inhalation every 6 hours PRN Shortness of Breath and/or Wheezing  LORazepam     Tablet 1 milliGRAM(s) Oral every 2 hours PRN CIWA-Ar score increase by 2 points and a total score of 7 or less  LORazepam   Injectable 1 milliGRAM(s) IV Push every 1 hour PRN CIWA-Ar score 8 or greater    Vital Signs Last 24 Hrs  T(C): 36.3 (14 Jul 2023 08:21), Max: 37.2 (13 Jul 2023 20:56)  T(F): 97.4 (14 Jul 2023 08:21), Max: 98.9 (13 Jul 2023 20:56)  HR: 70 (14 Jul 2023 08:21) (63 - 97)  BP: 102/53 (14 Jul 2023 08:21) (100/45 - 119/69)  BP(mean): --  RR: 18 (14 Jul 2023 08:21) (18 - 18)  SpO2: 97% (14 Jul 2023 08:21) (97% - 99%)    Parameters below as of 14 Jul 2023 08:21  Patient On (Oxygen Delivery Method): nasal cannula  O2 Flow (L/min): 4    PHYSICAL EXAM:  Constitutional: NAD, awake and alert  HEENT:  EOMI,  Pupils round, No oral cyanosis.  Pulmonary: Non-labored, breath sounds are clear bilaterally, No wheezing, rales or rhonchi  Cardiovascular: S1 and S2, regular rate and irreg rhythm, no Murmurs, gallops or rubs  Gastrointestinal: Bowel Sounds present, soft, nontender.   Ext: trace-1+ RLE edema; LBKA.  Neurological: Alert, no focal deficits  Skin: No rashes.  Psych:  Mood & affect appropriate    LABS: Reviewed                           11.0 11.93 )-----------( 174      ( 14 Jul 2023 06:47 )             32.6     07-14    130<L>  |  96  |  13  ----------------------------<  105<H>  3.7   |  29  |  0.69    Ca    8.5      14 Jul 2023 06:47        07-13    134<L>  |  96  |  13  ----------------------------<  126<H>  3.5   |  36<H>  |  0.81    Ca    8.9      13 Jul 2023 07:09  Phos  3.5     07-12  Mg     1.8     07-12      - TroponinI hsT: <-5.72, <-7.54    TroponinI hsT: <-5.72, <-7.54    CARDIAC MARKERS ( 09 Jul 2023 07:07 )x     / x     / 24 U/L / x     / x          TTE on 5/30/23 - copy in pt's chart, not available in [facility]  Echo with preserved LVEF 60%, moderate MR, mild to moderate TR     EKG afib rate 101 with normal axis intervals    Xray Chest 1 View- PORTABLE-Urgent (07.08.23 @ 11:51):  The lungs imaged increased pulmonary vascular congestion. No gross consolidation is seen. No pleural effusion is seen. The heart is enlarged.  IMPRESSION: Cardiomegaly. Increased pulmonary vascular congestion noted.

## 2023-07-14 NOTE — DISCHARGE NOTE PROVIDER - NSDCCPCAREPLAN_GEN_ALL_CORE_FT
PRINCIPAL DISCHARGE DIAGNOSIS  Diagnosis: CHF exacerbation  Assessment and Plan of Treatment: Take medications as directed, many medication changes have been made. Follow up with MERRI[facility] Brambila and with Dr. Middleton. Limit fluid intake to 1.5L/day.      SECONDARY DISCHARGE DIAGNOSES  Diagnosis: Pulmonary nodule  Assessment and Plan of Treatment: Get a repeat CT chest in 6 wks.    Diagnosis: Liver cirrhosis  Assessment and Plan of Treatment: Seen on imaging, follow up with hepatology, Dr. Prather.    Diagnosis: Anemia of chronic disease  Assessment and Plan of Treatment: Vs occult blood loss. Follow up with Dr. Barker for possible scopes.

## 2023-07-14 NOTE — DISCHARGE NOTE PROVIDER - HOSPITAL COURSE
CC: CHF exac  HPI and Hospital course: 67 y/o man with obesity, HTN, HLD, HFpEF, mod MR, paroxysmal afib s/p Watchman, PAD hx of L BKA, VIJAY non adherent to CPAP, OHS, COPD, pulmonary HTN, chronic respiratory failure with hypoxia and hypercapnea, on home O2 4L/min, also with hemorrhoids s/p ligation earlier this year, and cirrhosis likely related to alcohol use and fatty liver due to obesity. He was recently admitted 5/29-6/5 for Parainfluenza infection associated with COPD and CHF exacerbations. He now returns 7/8/23 with progressively worsening dyspnea, likely missed Lasix doses, also slight tremor with tongue fasciculations in the setting of alcohol use at his assisted living residence. Admitted for further management.    Acute on chronic diastolic CHF  In setting of alcohol consumption, non adherence to medications, admits to skipping furosemide and other medications while drinking alcohol. Presenting signs and symptoms consistent with fluid overload. Appreciate input from Cardiology and CHF Team. TTE 5/30/23 w/ EF 60-65%, LVEDd 6.16 IVS 1.33 PWd 1.32 LA Moderately dilated, moderate MR, mild to moderate TR, RSVP 33.   -BB, aldactone  -lasix now oral  -adding farxiga, cost checked  - Amyloid workup in process, PYP study prior to d/c, Immunofixation showing weak IgG Kappa Band, should f/u with CHF team and refer to heme onc as needed  - Is and Os, daily weight  - Trend lytes and Cr  - Avoid added salt in diet, fluid restrict to 1.5L/day  (pt not following restriction, reinforced by RN)    Permanent atrial fibrillation  HR suboptimally controlled. Was in RVR in ED. Increased Cardizem DC to 180mg daily. Continued beta blocker. Not on AC as he is s/p Watchman. Has hx of GI bleed.   - Continue Cardizem CD and metoprolol    Moderate mitral regurgitation  As per past echo, TTE 5/30/23 EF 60-65%, LVEDd 6.16 IVS 1.33 PWd 1.32 LA Moderately dilated, moderate MR, mild to moderate TR, RSVP 33.   - Continue to monitor as outpatient    Alcohol use disorder   Questionable mild withdrawal upon presentation. Initially treated with standing Ativan and prn Ativan via CIWA-protocol. Mild tremulousness improved but patient actually appeared drowsy, could have been from Ativan and/or transient worsening hypercapnea, though blood gasses have been close to baseline. Not scoring on CIWA. Ativan discontinued. Patient more alert today.   - Continue Thiamine, MVI, folate  - SW evaluation    Cirrhosis  Etiology of cirrhosis likely related to alcohol use and fatty liver due to obesity. No encephalopathy. No ascites. Unknown as to presence of varices. Splenic hypodensity noted on CT.   - Continue diuretics.   - Outpatient follow up with Hepatology / GI advised.    Hemorrhoids  Had hemorrhoid ligation earlier this year.  - Continue to monitor    VIJAY  Nonadherent to CPAP  - Continue NIPPV qHS, encouraged its use    COPD on home O2  Stable. Appreciate Pulmonary Medicine input  - Continue O2 supplementation  - Continue Symbicort, added Spiriva    Pulmonary nodules in MICHELLE and RLL  As noted on CT  - Repeat CT 6 wks    Chronic hyponatremia  Stable. In setting of CHF and cirrhosis. Mentation at baseline.   - Continue to monitor    Normocytic anemia  Did have an episode of rectal bleeding prior to admission, suspected to be hemorrhoidal. He does have hx of R ischemic colitis in past and underlying cirrhosis / alcohol use disorder that could also be responsible for his chronic anemia. No overt bleeding this admission. Per Dr Barker, can follow up outpatient for possible EGD and colonoscopy.   - Outpatient follow up    Vit D deficiency  25-OH vit D 17 ng/mL.   - Continue Vit D replacement      DVT px: LMWH  Code status: Full    For return to [facility], F2F.  I have spent 40 min on day of d/c coordinating care.

## 2023-07-14 NOTE — PROGRESS NOTE ADULT - PROBLEM SELECTOR PROBLEM 1
Acute on chronic diastolic congestive heart failure
Acute hypoxemic respiratory failure
Acute on chronic diastolic congestive heart failure
(HFpEF) heart failure with preserved ejection fraction
Acute on chronic diastolic congestive heart failure

## 2023-07-17 ENCOUNTER — NON-APPOINTMENT (OUTPATIENT)
Age: 68
End: 2023-07-17

## 2023-07-19 DIAGNOSIS — G47.33 OBSTRUCTIVE SLEEP APNEA (ADULT) (PEDIATRIC): ICD-10-CM

## 2023-07-19 DIAGNOSIS — J43.9 EMPHYSEMA, UNSPECIFIED: ICD-10-CM

## 2023-07-19 DIAGNOSIS — I48.20 CHRONIC ATRIAL FIBRILLATION, UNSPECIFIED: ICD-10-CM

## 2023-07-19 DIAGNOSIS — J96.21 ACUTE AND CHRONIC RESPIRATORY FAILURE WITH HYPOXIA: ICD-10-CM

## 2023-07-19 DIAGNOSIS — I50.33 ACUTE ON CHRONIC DIASTOLIC (CONGESTIVE) HEART FAILURE: ICD-10-CM

## 2023-07-19 DIAGNOSIS — R91.8 OTHER NONSPECIFIC ABNORMAL FINDING OF LUNG FIELD: ICD-10-CM

## 2023-07-19 DIAGNOSIS — I48.21 PERMANENT ATRIAL FIBRILLATION: ICD-10-CM

## 2023-07-19 DIAGNOSIS — E55.9 VITAMIN D DEFICIENCY, UNSPECIFIED: ICD-10-CM

## 2023-07-19 DIAGNOSIS — D64.9 ANEMIA, UNSPECIFIED: ICD-10-CM

## 2023-07-19 DIAGNOSIS — Z91.148 PATIENT'S OTHER NONCOMPLIANCE WITH MEDICATION REGIMEN FOR OTHER REASON: ICD-10-CM

## 2023-07-19 DIAGNOSIS — I11.0 HYPERTENSIVE HEART DISEASE WITH HEART FAILURE: ICD-10-CM

## 2023-07-19 DIAGNOSIS — K76.0 FATTY (CHANGE OF) LIVER, NOT ELSEWHERE CLASSIFIED: ICD-10-CM

## 2023-07-19 DIAGNOSIS — I27.20 PULMONARY HYPERTENSION, UNSPECIFIED: ICD-10-CM

## 2023-07-19 DIAGNOSIS — Z89.512 ACQUIRED ABSENCE OF LEFT LEG BELOW KNEE: ICD-10-CM

## 2023-07-19 DIAGNOSIS — E87.1 HYPO-OSMOLALITY AND HYPONATREMIA: ICD-10-CM

## 2023-07-19 DIAGNOSIS — K64.4 RESIDUAL HEMORRHOIDAL SKIN TAGS: ICD-10-CM

## 2023-07-19 DIAGNOSIS — K70.30 ALCOHOLIC CIRRHOSIS OF LIVER WITHOUT ASCITES: ICD-10-CM

## 2023-07-19 DIAGNOSIS — K21.9 GASTRO-ESOPHAGEAL REFLUX DISEASE WITHOUT ESOPHAGITIS: ICD-10-CM

## 2023-07-19 DIAGNOSIS — Z91.199 PATIENT'S NONCOMPLIANCE WITH OTHER MEDICAL TREATMENT AND REGIMEN DUE TO UNSPECIFIED REASON: ICD-10-CM

## 2023-07-19 DIAGNOSIS — Z99.89 DEPENDENCE ON OTHER ENABLING MACHINES AND DEVICES: ICD-10-CM

## 2023-07-19 DIAGNOSIS — I73.9 PERIPHERAL VASCULAR DISEASE, UNSPECIFIED: ICD-10-CM

## 2023-07-19 DIAGNOSIS — F10.20 ALCOHOL DEPENDENCE, UNCOMPLICATED: ICD-10-CM

## 2023-07-19 DIAGNOSIS — I34.0 NONRHEUMATIC MITRAL (VALVE) INSUFFICIENCY: ICD-10-CM

## 2023-07-24 ENCOUNTER — APPOINTMENT (OUTPATIENT)
Dept: CARDIOLOGY | Facility: CLINIC | Age: 68
End: 2023-07-24

## 2023-07-24 ENCOUNTER — RX RENEWAL (OUTPATIENT)
Age: 68
End: 2023-07-24

## 2023-07-26 ENCOUNTER — RX RENEWAL (OUTPATIENT)
Age: 68
End: 2023-07-26

## 2023-07-27 ENCOUNTER — NON-APPOINTMENT (OUTPATIENT)
Age: 68
End: 2023-07-27

## 2023-07-27 ENCOUNTER — APPOINTMENT (OUTPATIENT)
Dept: FAMILY MEDICINE | Facility: CLINIC | Age: 68
End: 2023-07-27
Payer: MEDICARE

## 2023-07-27 VITALS
SYSTOLIC BLOOD PRESSURE: 130 MMHG | HEIGHT: 72 IN | DIASTOLIC BLOOD PRESSURE: 80 MMHG | HEART RATE: 95 BPM | WEIGHT: 315 LBS | OXYGEN SATURATION: 94 % | BODY MASS INDEX: 42.66 KG/M2

## 2023-07-27 DIAGNOSIS — Z09 ENCOUNTER FOR FOLLOW-UP EXAMINATION AFTER COMPLETED TREATMENT FOR CONDITIONS OTHER THAN MALIGNANT NEOPLASM: ICD-10-CM

## 2023-07-27 DIAGNOSIS — K64.9 UNSPECIFIED HEMORRHOIDS: ICD-10-CM

## 2023-07-27 PROCEDURE — 36415 COLL VENOUS BLD VENIPUNCTURE: CPT

## 2023-07-27 PROCEDURE — 99495 TRANSJ CARE MGMT MOD F2F 14D: CPT | Mod: 25

## 2023-07-27 RX ORDER — HYDROCORTISONE ACETATE 25 MG/1
25 SUPPOSITORY RECTAL
Qty: 1 | Refills: 2 | Status: ACTIVE | COMMUNITY
Start: 2023-07-27 | End: 1900-01-01

## 2023-07-28 ENCOUNTER — APPOINTMENT (OUTPATIENT)
Age: 68
End: 2023-07-28

## 2023-07-28 LAB
ALBUMIN SERPL ELPH-MCNC: 3.8 G/DL
ALP BLD-CCNC: 102 U/L
ALT SERPL-CCNC: 10 U/L
ANION GAP SERPL CALC-SCNC: 13 MMOL/L
AST SERPL-CCNC: 14 U/L
BILIRUB SERPL-MCNC: 0.5 MG/DL
BUN SERPL-MCNC: 10 MG/DL
CALCIUM SERPL-MCNC: 8.9 MG/DL
CHLORIDE SERPL-SCNC: 96 MMOL/L
CHOLEST SERPL-MCNC: 184 MG/DL
CO2 SERPL-SCNC: 27 MMOL/L
CREAT SERPL-MCNC: 0.85 MG/DL
EGFR: 96 ML/MIN/1.73M2
ESTIMATED AVERAGE GLUCOSE: 108 MG/DL
FERRITIN SERPL-MCNC: 101 NG/ML
GLUCOSE SERPL-MCNC: 114 MG/DL
HBA1C MFR BLD HPLC: 5.4 %
HDLC SERPL-MCNC: 38 MG/DL
IRON SATN MFR SERPL: 17 %
IRON SERPL-MCNC: 56 UG/DL
LDLC SERPL CALC-MCNC: 127 MG/DL
NONHDLC SERPL-MCNC: 146 MG/DL
POTASSIUM SERPL-SCNC: 4.4 MMOL/L
PROT SERPL-MCNC: 6.2 G/DL
SODIUM SERPL-SCNC: 137 MMOL/L
TIBC SERPL-MCNC: 327 UG/DL
TRIGL SERPL-MCNC: 107 MG/DL
TSH SERPL-ACNC: 0.76 UIU/ML
UIBC SERPL-MCNC: 271 UG/DL

## 2023-07-28 NOTE — PHYSICAL EXAM
[Normal] : normal rate, regular rhythm, normal S1 and S2 and no murmur heard [No Rash] : no rash [Normal Affect] : the affect was normal

## 2023-07-28 NOTE — SBIRT NOTE ADULT - NSSBIRTCONCERNEDDRINK_GEN_A_CORE
[facility] EMERGENCY DEPARTMENT  [address] [place] [id number]  Phone: [phone]    Myles Shannon was seen and treated in our emergency department on 7/28/2023.  She may return to work on 07/31/2023.         Thank you for choosing [facility].    Oh Linares, DO       No

## 2023-08-01 ENCOUNTER — APPOINTMENT (OUTPATIENT)
Dept: CARDIOLOGY | Facility: CLINIC | Age: 68
End: 2023-08-01
Payer: MEDICARE

## 2023-08-01 VITALS — OXYGEN SATURATION: 98 % | HEART RATE: 83 BPM | SYSTOLIC BLOOD PRESSURE: 114 MMHG | DIASTOLIC BLOOD PRESSURE: 80 MMHG

## 2023-08-01 PROCEDURE — 99214 OFFICE O/P EST MOD 30 MIN: CPT | Mod: 25

## 2023-08-01 PROCEDURE — 93000 ELECTROCARDIOGRAM COMPLETE: CPT

## 2023-08-01 NOTE — PHYSICAL EXAM
[Normal] : clear lung fields, good air entry, no respiratory distress [Soft] : abdomen soft [Alert and Oriented] : alert and oriented [de-identified] : Obese [de-identified] : Irreg [de-identified] : wheelchair use of cane L BKA [de-identified] : RLE trace PA edema mild erythema which he reports is chronic and at baseline

## 2023-08-01 NOTE — HISTORY OF PRESENT ILLNESS
[FreeTextEntry1] : 69 y/o male with PMH of HFpEF, paroxysmal atrial fibrillation s/p Watchman, mod MR, COPD on 4 L of home O2,  ETOH induced cirrhosis, HTN, obesity, chronic resp failure on home O2 4L, VIJAY (noncompliant w/ CPAP), alcoholism, severe pulmonary HTN, Left BKA, PAD, here today post hospital follow up admitted with SOB having h/o decompensated CHF HFpEF PW with increasing SOB in setting of medication and dietary noncompliance, he admits to skipping medications including diuretics while drinking,  recent admission for COPD exacerbation with CHF.  reports in ER he had significant improvement with IV Lasix, he denies CP/Palpitations Currently claims to be feeling well SOB Improved   medications/cardiac testing reviewed.

## 2023-08-01 NOTE — DISCUSSION/SUMMARY
[EKG obtained to assist in diagnosis and management of assessed problem(s)] : EKG obtained to assist in diagnosis and management of assessed problem(s) [FreeTextEntry1] : here today post hospital follow up admitted with  Acute on chronic diastolic congestive heart failure.  HFpEF exacerbation, fluid overload due to noncompliance with meds and ETOH abuse.Admits to skipping medications ( lasix and other meds )  while drinking alcohol Echo shows preserved LVEF 60%, moderate MR, mild to moderate TR Nuclear stress test 10/19/21 No evidence of fixed or reversible perfusion defect, no RWMA Appears euvolemic upon exam breathing comfortably. Had long discussion regarding strict adherence to pharmacotherapy/Low sodium diet /weight reduction and daily weights  and avoidance of Alcohol  CW close follow up with HF team (Amyloid work up) Continue current medications  AF: EKG today AF rate controlled.  s/p Watchman, no AC due to GIB Recommendation: cont. cardizem  mg po daily, cont. BB.   ETOH abuse. pt. with long Hx of ETOH abuse, non compliance with meds, multiple admissions, have advised to eliminate ETOH   OV 2 months and close follow up with HF team

## 2023-08-04 NOTE — PLAN
[FreeTextEntry1] : Hospital record, labs and imaging reviewed Medication list updated and medication refills for new dosages provided Start hydrocort suppositories  STRONGLY encourage to keep using portable oxygen regularly follow up in 3 months or sooner if needed  Keep all appointments with specialist encouraged    STONE DIAZ cannot walk in his home using any ambulation device. he requires a motorized wheelchair so he can perform mobility activities of daily living like getting to the bathroom for hygiene, the kitchen for eating and bedroom for resting. STONE suffers from s/p below the knee amputation, recurrent leg wound/cellulitis, venous insufficiency, and instability. I feel he can safely propel with minimal assistance throughout the home and he is agreeable to using the wheelchair. I am recommending a wheelchair at this time for he is a fall risk with unsteady gait.

## 2023-08-04 NOTE — HISTORY OF PRESENT ILLNESS
[Post-hospitalization from ___ Hospital] : Post-hospitalization from [unfilled] Hospital [Admitted on: ___] : The patient was admitted on [unfilled] [Discharged on ___] : discharged on [unfilled] [FreeTextEntry2] : 69yo F presents for hospital follow up. Pt went to ED due to SOB 2/2 to missing dose of diuretic. Pt reports he had medication changes at the hospital, was told to keep oxygen at 4L at home. P Pt reports in the hospital they titrated his medications and he is taking them as directed. Pt reports he also was possibly having tremors due to alcohol consumption when entering the hospital. Pt reports he is not drinking since. Pt has follow ups scheduled with cardiology, pulmonology and is now going to start seeing a cardiology specifically for heart failure. Pt has cirrhosis but does not have a hepatologist or gastroenterologist at this time. Pt is on portable oxygen now and reports he is wearing it most of the time. Pt reports he is doing well and is feeling good however when walking he is struggling and becoming very short of breath. Pt is seeking a motorized scooter. \par Pt denies CP, palpitaitons or SOB at this time.

## 2023-08-14 ENCOUNTER — APPOINTMENT (OUTPATIENT)
Dept: PULMONOLOGY | Facility: CLINIC | Age: 68
End: 2023-08-14
Payer: MEDICARE

## 2023-08-14 VITALS
RESPIRATION RATE: 16 BRPM | SYSTOLIC BLOOD PRESSURE: 123 MMHG | HEART RATE: 94 BPM | OXYGEN SATURATION: 97 % | DIASTOLIC BLOOD PRESSURE: 62 MMHG

## 2023-08-14 DIAGNOSIS — R06.09 OTHER FORMS OF DYSPNEA: ICD-10-CM

## 2023-08-14 PROCEDURE — 99205 OFFICE O/P NEW HI 60 MIN: CPT

## 2023-08-14 PROCEDURE — 99215 OFFICE O/P EST HI 40 MIN: CPT

## 2023-08-14 RX ORDER — ALBUTEROL SULFATE 90 UG/1
108 (90 BASE) INHALANT RESPIRATORY (INHALATION)
Qty: 1 | Refills: 4 | Status: ACTIVE | COMMUNITY
Start: 2019-04-26 | End: 1900-01-01

## 2023-08-15 RX ORDER — TIOTROPIUM BROMIDE 18 UG/1
18 CAPSULE ORAL; RESPIRATORY (INHALATION)
Qty: 30 | Refills: 4 | Status: DISCONTINUED | COMMUNITY
Start: 2021-10-25 | End: 2023-08-15

## 2023-08-15 RX ORDER — UMECLIDINIUM 62.5 UG/1
62.5 AEROSOL, POWDER ORAL
Qty: 3 | Refills: 3 | Status: ACTIVE | COMMUNITY
Start: 2023-08-15 | End: 1900-01-01

## 2023-08-15 NOTE — HISTORY OF PRESENT ILLNESS
[Initial Evaluation] : an initial evaluation of [Dyspnea on Exertion] : dyspnea on exertion [Initial Eval - Existing Diagnosis] : an initial evaluation of an existing diagnosis of [Excess Weight] : excess weight [Currently Experiencing] : The patient is currently experiencing symptoms. [Short-Acting Beta Agonists] : short-acting beta agonists [Long-Acting Beta Agonists] : long-acting beta agonists [Anticholinergics (Inhalation)] : inhaled anticholinergics [Inhaled Corticosteroids] : inhaled corticosteroids [Supplemental Oxygen] : supplemental oxygen [Good Compliance] : good compliance with treatment [Good Tolerance] : good tolerance of treatment [Good Symptom Control] : good symptom control [Back Pain] : back pain [Low Calorie Diet] : low calorie diet [Fair Compliance] : fair compliance with treatment [Fair Tolerance] : fair tolerance of treatment [Fair Symptom Control] : fair symptom control [Hypertension] : hypertension [On ___] : performed on [unfilled] [Patient] : the patient [Indication ___] : for an indication of [unfilled] [None] : no new symptoms reported [TextBox_4] : Former smoker of 1 ppd x 40 years, quit 2018; h/o ETOH abuse with underlying liver cirrhosis and ascites. Pt was recently hospitalized from 7/8-7/14/23 for SOB and was thought to be due to AECOPD, CHF. Pt is maintained on Symbicort 160 and Spiriva and Albuterol inhaler and nebulizer. Pt is maintained on 4 lpm O2.  Followed with cardiology for CHF but denies CAD. S/p L BKA with prosthesis due to prior trauma. Pt seen by multiple pulmonologists in the past but has not been compliant with f/u and recommendations. Refusing PSG though likely has VIJAY.  [de-identified] : CHF [FreeTextEntry9] : Chest CT [FreeTextEntry8] : Improved b/l LL pneumonia and bronchiolitis with near resolution of prior new MICHELLE nodule

## 2023-08-15 NOTE — PHYSICAL EXAM
[No Acute Distress] : no acute distress [Low Lying Soft Palate] : low lying soft palate [IV] : Mallampati Class: IV [Normal Appearance] : normal appearance [Normal Rate/Rhythm] : normal rate/rhythm [Normal S1, S2] : normal s1, s2 [No Murmurs] : no murmurs [No Resp Distress] : no resp distress [No Acc Muscle Use] : no acc muscle use [Normal Rhythm and Effort] : normal rhythm and effort [Clear to Auscultation Bilaterally] : clear to auscultation bilaterally [No Abnormalities] : no abnormalities [Benign] : benign [Not Tender] : not tender [Soft] : soft [No Clubbing] : no clubbing [1+ Pitting] : 1+ pitting [No Focal Deficits] : no focal deficits [Oriented x3] : oriented x3 [TextBox_11] : mod to severe oropharyngeal crowding.  [TextBox_105] : RLE edema and LLE prosthesis

## 2023-08-15 NOTE — CONSULT LETTER
[Dear  ___] : Dear  [unfilled], [Consult Letter:] : I had the pleasure of evaluating your patient, [unfilled]. [Please see my note below.] : Please see my note below. [Consult Closing:] : Thank you very much for allowing me to participate in the care of this patient.  If you have any questions, please do not hesitate to contact me. [Sincerely,] : Sincerely, [FreeTextEntry3] : Eugene Medina MD, FCCP, D. ABSM Pulmonary and Sleep Medicine [facility] at [place]

## 2023-08-15 NOTE — PROCEDURE
[FreeTextEntry1] : [place] 4/20/18 - Moderately reduced FVC and FEV1 with obstruction PFTs 12/9/20 - Mild to moderately reduced FVC and FEV1 but without obstruction but reduced lung volumes and mildly reduced DLCO which corrected for Va. Babak 8/5/21 - Moderate to severely reduced FVC and FEV1 with obstruction [place] 3/24/22 - Severely reduced FVC and FEV1 with obstruction [place] 1/2/23 - Severely reduced FVC and FEV1 with obstruction

## 2023-08-15 NOTE — DISCUSSION/SUMMARY
[FreeTextEntry1] : #1. Prjior lung function testing with severe COPD though likely component of restriction due to morbid obesity is likely contributing. #2. Continue current regimen of Symbicort and Spiriva #3. SOBOE is likely at least somewhat related to weight or deconditioning as well #4. Diet and exercise for weight loss is imperative #5. Suspect VIJAY but pt refusing w/u #6. On home O2; recommended supplemental O2 to keep O2 sat >90% #7. Cardiology f/u to optimize cardiac function.  #8. Follow CT imaging as needed though last CT was improved #9. GI f/u for liver cirrhosis #10. Overall, poor prognosis given multiple medical issues coupled with relative non-compliance with pulm f/u and recommendations. #11. F/u in 3-4 months with PFTs if pt willing but left without making f/u apts  The patient expressed understanding and agreement with the above recommendations/plan and accepts responsibility to be compliant with recommended testing, therapies, and f/u visits. All relevant questions and concerns were addressed.  Discussed above with patient and daughter who was also present.

## 2023-08-15 NOTE — REVIEW OF SYSTEMS
[GERD] : gerd [Depression] : depression [Obesity] : obesity [Negative] : Neurologic [TextBox_69] : On PPI [TextBox_91] : in wheelchair; s/p LLL PATTIEA

## 2023-08-19 NOTE — PROGRESS NOTE ADULT - PROBLEM SELECTOR PLAN 2
Spoke with PFC about patient condition.    No further signs of acute withdrawal.    continue to monitor mental status.  daily sedation vacation

## 2023-08-21 ENCOUNTER — RX RENEWAL (OUTPATIENT)
Age: 68
End: 2023-08-21

## 2023-08-24 ENCOUNTER — APPOINTMENT (OUTPATIENT)
Dept: FAMILY MEDICINE | Facility: CLINIC | Age: 68
End: 2023-08-24

## 2023-08-30 ENCOUNTER — EMERGENCY (EMERGENCY)
Facility: HOSPITAL | Age: 68
LOS: 0 days | Discharge: HOME | End: 2023-08-30
Attending: STUDENT IN AN ORGANIZED HEALTH CARE EDUCATION/TRAINING PROGRAM
Payer: MEDICARE

## 2023-08-30 VITALS
SYSTOLIC BLOOD PRESSURE: 112 MMHG | RESPIRATION RATE: 28 BRPM | WEIGHT: 287.92 LBS | HEIGHT: 72 IN | TEMPERATURE: 98 F | HEART RATE: 82 BPM | OXYGEN SATURATION: 97 % | DIASTOLIC BLOOD PRESSURE: 60 MMHG

## 2023-08-30 VITALS
DIASTOLIC BLOOD PRESSURE: 79 MMHG | HEART RATE: 98 BPM | RESPIRATION RATE: 20 BRPM | SYSTOLIC BLOOD PRESSURE: 136 MMHG | OXYGEN SATURATION: 94 % | TEMPERATURE: 99 F

## 2023-08-30 DIAGNOSIS — Z79.82 LONG TERM (CURRENT) USE OF ASPIRIN: ICD-10-CM

## 2023-08-30 DIAGNOSIS — Z98.890 OTHER SPECIFIED POSTPROCEDURAL STATES: Chronic | ICD-10-CM

## 2023-08-30 DIAGNOSIS — Z89.512 ACQUIRED ABSENCE OF LEFT LEG BELOW KNEE: Chronic | ICD-10-CM

## 2023-08-30 DIAGNOSIS — Z95.818 PRESENCE OF OTHER CARDIAC IMPLANTS AND GRAFTS: Chronic | ICD-10-CM

## 2023-08-30 DIAGNOSIS — Z79.84 LONG TERM (CURRENT) USE OF ORAL HYPOGLYCEMIC DRUGS: ICD-10-CM

## 2023-08-30 DIAGNOSIS — R05.9 COUGH, UNSPECIFIED: ICD-10-CM

## 2023-08-30 DIAGNOSIS — I50.89 OTHER HEART FAILURE: ICD-10-CM

## 2023-08-30 DIAGNOSIS — I11.0 HYPERTENSIVE HEART DISEASE WITH HEART FAILURE: ICD-10-CM

## 2023-08-30 DIAGNOSIS — I48.91 UNSPECIFIED ATRIAL FIBRILLATION: ICD-10-CM

## 2023-08-30 DIAGNOSIS — K21.9 GASTRO-ESOPHAGEAL REFLUX DISEASE WITHOUT ESOPHAGITIS: ICD-10-CM

## 2023-08-30 DIAGNOSIS — R06.02 SHORTNESS OF BREATH: ICD-10-CM

## 2023-08-30 DIAGNOSIS — J44.9 CHRONIC OBSTRUCTIVE PULMONARY DISEASE, UNSPECIFIED: ICD-10-CM

## 2023-08-30 DIAGNOSIS — Z86.2 PERSONAL HISTORY OF DISEASES OF THE BLOOD AND BLOOD-FORMING ORGANS AND CERTAIN DISORDERS INVOLVING THE IMMUNE MECHANISM: ICD-10-CM

## 2023-08-30 DIAGNOSIS — Z20.822 CONTACT WITH AND (SUSPECTED) EXPOSURE TO COVID-19: ICD-10-CM

## 2023-08-30 DIAGNOSIS — Z87.09 PERSONAL HISTORY OF OTHER DISEASES OF THE RESPIRATORY SYSTEM: ICD-10-CM

## 2023-08-30 DIAGNOSIS — Z93.1 GASTROSTOMY STATUS: Chronic | ICD-10-CM

## 2023-08-30 DIAGNOSIS — S88.119A COMPLETE TRAUMATIC AMPUTATION AT LEVEL BETWEEN KNEE AND ANKLE, UNSPECIFIED LOWER LEG, INITIAL ENCOUNTER: Chronic | ICD-10-CM

## 2023-08-30 DIAGNOSIS — Z88.1 ALLERGY STATUS TO OTHER ANTIBIOTIC AGENTS STATUS: ICD-10-CM

## 2023-08-30 DIAGNOSIS — Z89.512 ACQUIRED ABSENCE OF LEFT LEG BELOW KNEE: ICD-10-CM

## 2023-08-30 LAB
ALBUMIN SERPL ELPH-MCNC: 3.2 G/DL — LOW (ref 3.3–5)
ALP SERPL-CCNC: 114 U/L — SIGNIFICANT CHANGE UP (ref 40–120)
ALT FLD-CCNC: 13 U/L — SIGNIFICANT CHANGE UP (ref 12–78)
ANION GAP SERPL CALC-SCNC: 3 MMOL/L — LOW (ref 5–17)
AST SERPL-CCNC: 14 U/L — LOW (ref 15–37)
BASE EXCESS BLDV CALC-SCNC: 5.2 MMOL/L — HIGH (ref -2–3)
BASOPHILS # BLD AUTO: 0.03 K/UL — SIGNIFICANT CHANGE UP (ref 0–0.2)
BASOPHILS NFR BLD AUTO: 0.4 % — SIGNIFICANT CHANGE UP (ref 0–2)
BILIRUB SERPL-MCNC: 0.4 MG/DL — SIGNIFICANT CHANGE UP (ref 0.2–1.2)
BUN SERPL-MCNC: 12 MG/DL — SIGNIFICANT CHANGE UP (ref 7–23)
CALCIUM SERPL-MCNC: 8.9 MG/DL — SIGNIFICANT CHANGE UP (ref 8.5–10.1)
CHLORIDE SERPL-SCNC: 102 MMOL/L — SIGNIFICANT CHANGE UP (ref 96–108)
CO2 SERPL-SCNC: 29 MMOL/L — SIGNIFICANT CHANGE UP (ref 22–31)
CREAT SERPL-MCNC: 0.84 MG/DL — SIGNIFICANT CHANGE UP (ref 0.5–1.3)
EGFR: 96 ML/MIN/1.73M2 — SIGNIFICANT CHANGE UP
EOSINOPHIL # BLD AUTO: 0.17 K/UL — SIGNIFICANT CHANGE UP (ref 0–0.5)
EOSINOPHIL NFR BLD AUTO: 2.2 % — SIGNIFICANT CHANGE UP (ref 0–6)
GAS PNL BLDV: SIGNIFICANT CHANGE UP
GLUCOSE SERPL-MCNC: 101 MG/DL — HIGH (ref 70–99)
HCO3 BLDV-SCNC: 29 MMOL/L — SIGNIFICANT CHANGE UP (ref 22–29)
HCT VFR BLD CALC: 36.6 % — LOW (ref 39–50)
HGB BLD-MCNC: 11.7 G/DL — LOW (ref 13–17)
IMM GRANULOCYTES NFR BLD AUTO: 0.8 % — SIGNIFICANT CHANGE UP (ref 0–0.9)
LYMPHOCYTES # BLD AUTO: 1.25 K/UL — SIGNIFICANT CHANGE UP (ref 1–3.3)
LYMPHOCYTES # BLD AUTO: 15.9 % — SIGNIFICANT CHANGE UP (ref 13–44)
MCHC RBC-ENTMCNC: 29.1 PG — SIGNIFICANT CHANGE UP (ref 27–34)
MCHC RBC-ENTMCNC: 32 GM/DL — SIGNIFICANT CHANGE UP (ref 32–36)
MCV RBC AUTO: 91 FL — SIGNIFICANT CHANGE UP (ref 80–100)
MONOCYTES # BLD AUTO: 0.62 K/UL — SIGNIFICANT CHANGE UP (ref 0–0.9)
MONOCYTES NFR BLD AUTO: 7.9 % — SIGNIFICANT CHANGE UP (ref 2–14)
NEUTROPHILS # BLD AUTO: 5.71 K/UL — SIGNIFICANT CHANGE UP (ref 1.8–7.4)
NEUTROPHILS NFR BLD AUTO: 72.8 % — SIGNIFICANT CHANGE UP (ref 43–77)
NT-PROBNP SERPL-SCNC: 1159 PG/ML — HIGH (ref 0–125)
PCO2 BLDV: 39 MMHG — LOW (ref 42–55)
PH BLDV: 7.48 — HIGH (ref 7.32–7.43)
PLATELET # BLD AUTO: 177 K/UL — SIGNIFICANT CHANGE UP (ref 150–400)
PO2 BLDV: 151 MMHG — HIGH (ref 25–45)
POTASSIUM SERPL-MCNC: 4.4 MMOL/L — SIGNIFICANT CHANGE UP (ref 3.5–5.3)
POTASSIUM SERPL-SCNC: 4.4 MMOL/L — SIGNIFICANT CHANGE UP (ref 3.5–5.3)
PROT SERPL-MCNC: 7.6 GM/DL — SIGNIFICANT CHANGE UP (ref 6–8.3)
RAPID RVP RESULT: SIGNIFICANT CHANGE UP
RBC # BLD: 4.02 M/UL — LOW (ref 4.2–5.8)
RBC # FLD: 14 % — SIGNIFICANT CHANGE UP (ref 10.3–14.5)
SAO2 % BLDV: 98 % — HIGH (ref 67–88)
SARS-COV-2 RNA SPEC QL NAA+PROBE: SIGNIFICANT CHANGE UP
SODIUM SERPL-SCNC: 134 MMOL/L — LOW (ref 135–145)
TROPONIN I, HIGH SENSITIVITY RESULT: 7.86 NG/L — SIGNIFICANT CHANGE UP
WBC # BLD: 7.84 K/UL — SIGNIFICANT CHANGE UP (ref 3.8–10.5)
WBC # FLD AUTO: 7.84 K/UL — SIGNIFICANT CHANGE UP (ref 3.8–10.5)

## 2023-08-30 PROCEDURE — 83880 ASSAY OF NATRIURETIC PEPTIDE: CPT

## 2023-08-30 PROCEDURE — 93005 ELECTROCARDIOGRAM TRACING: CPT

## 2023-08-30 PROCEDURE — 99285 EMERGENCY DEPT VISIT HI MDM: CPT

## 2023-08-30 PROCEDURE — 99284 EMERGENCY DEPT VISIT MOD MDM: CPT

## 2023-08-30 PROCEDURE — 71045 X-RAY EXAM CHEST 1 VIEW: CPT

## 2023-08-30 PROCEDURE — 93010 ELECTROCARDIOGRAM REPORT: CPT

## 2023-08-30 PROCEDURE — 71045 X-RAY EXAM CHEST 1 VIEW: CPT | Mod: 26

## 2023-08-30 PROCEDURE — 85025 COMPLETE CBC W/AUTO DIFF WBC: CPT

## 2023-08-30 PROCEDURE — 94640 AIRWAY INHALATION TREATMENT: CPT

## 2023-08-30 PROCEDURE — 80053 COMPREHEN METABOLIC PANEL: CPT

## 2023-08-30 PROCEDURE — 36415 COLL VENOUS BLD VENIPUNCTURE: CPT

## 2023-08-30 PROCEDURE — 82803 BLOOD GASES ANY COMBINATION: CPT

## 2023-08-30 PROCEDURE — 84484 ASSAY OF TROPONIN QUANT: CPT

## 2023-08-30 PROCEDURE — 0225U NFCT DS DNA&RNA 21 SARSCOV2: CPT

## 2023-08-30 PROCEDURE — 99285 EMERGENCY DEPT VISIT HI MDM: CPT | Mod: 25

## 2023-08-30 RX ORDER — IPRATROPIUM/ALBUTEROL SULFATE 18-103MCG
3 AEROSOL WITH ADAPTER (GRAM) INHALATION
Refills: 0 | Status: COMPLETED | OUTPATIENT
Start: 2023-08-30 | End: 2023-08-30

## 2023-08-30 RX ADMIN — Medication 3 MILLILITER(S): at 08:02

## 2023-08-30 RX ADMIN — Medication 3 MILLILITER(S): at 09:02

## 2023-08-30 RX ADMIN — Medication 3 MILLILITER(S): at 08:19

## 2023-08-30 NOTE — ED PROVIDER NOTE - PATIENT PORTAL LINK FT
You can access the FollowMyHealth Patient Portal offered by [facility] by registering at the following website: [email or web address]. By joining [employer]’s FollowMyHealth portal, you will also be able to view your health information using other applications (apps) compatible with our system.

## 2023-08-30 NOTE — ED PROVIDER NOTE - OBJECTIVE STATEMENT
69 y/o male with a PMHx of anemia, Afib, CHF, COPD, cirrhosis, collapsed lung, emphysema  EtOH abuse, EtOH withdrawal, falls, GERD, HTN, meningitis, presence of Watchman left atrial appendage closure device presents to the ED c/o SOB and cough x1 day. Denies CP, fevers. Not on home O2. Nonsmoker. No other complaints at this time.

## 2023-08-30 NOTE — ED ADULT NURSE NOTE - NSFALLRISKINTERV_ED_ALL_ED

## 2023-08-30 NOTE — ED ADULT NURSE NOTE - OBJECTIVE STATEMENT
Pt BIBEMS c/o SOB that started yesterday. Hx COPD, normally on 4-6L O2 at home. SPO2 91-94 on 3L via NC in ED. pt denies pain, denies recent fever

## 2023-08-30 NOTE — ED PROVIDER NOTE - PROGRESS NOTE DETAILS
An extensive discussion was had with the patient regarding labs/imaging and tests prior to discharge. We discussed in depth the importance of follow up for continuing medical care as an outpatient. The patient was advised to return the Emergency Department for worsening or persistent symptoms, and/or any new or concerning symptoms.  pt spo2 94% on 4LPM (his home o2 dose)

## 2023-08-30 NOTE — PHARMACOTHERAPY INTERVENTION NOTE - COMMENTS
Med history complete, reviewed medications and allergies with patients med list from [facility] and confirmed medication list with doctor first med profile, all medication related questions answered

## 2023-08-30 NOTE — ED ADULT TRIAGE NOTE - CHIEF COMPLAINT QUOTE
Pt. BIBEMS from [facility] c/o shortness of breath. Pt. has hx. COPD, on 4L NC at baseline. Pt. reports worsening SOB/cough for the last 24 hours, requiring him to increase his O2. Pt. currently on 6L in triage. Denies CP.

## 2023-08-30 NOTE — ED PROVIDER NOTE - CLINICAL SUMMARY MEDICAL DECISION MAKING FREE TEXT BOX
Adult male presents for SOB. Vitals normal. Troponin negative. 12 lead shows Afib with no signs of ischemic. Pro BNP 1200.   Chest XR unchanged. Blood gas shows normal pH and normal CO2. RVP negative. Adult male presents for SOB. Vitals normal. Troponin negative. 12 lead shows Afib with no signs of ischemic. Pro BNP 1200. Chest XR unchanged. Blood gas shows normal pH and normal CO2. RVP negative. Adult male presents for SOB. Vitals normal. Troponin negative. 12 lead shows Afib with no signs of ischemic. Pro BNP 1200. Chest XR unchanged. Blood gas shows ph 7.48  and normal CO2. RVP negative. No hypoxia in ED.

## 2023-08-30 NOTE — ED ADULT NURSE NOTE - CHIEF COMPLAINT QUOTE
Pt. BIBEMS from [facility] c/o shortness of breath. Pt. has hx. COPD, on 4L NC at baseline. Pt. reports worsening SOB/cough for the last 24 hours, requiring him to increase his O2. Pt. currently on 6L in triage. Denies CP.

## 2023-08-30 NOTE — ED PROVIDER NOTE - NSFOLLOWUPINSTRUCTIONS_ED_ALL_ED_FT
Seek immediate medical assistance for any new or worsening symptoms. If you have issues obtaining follow up, please call: [phone] or [phone]  to obtain a doctor or specialist who takes your insurance in your area.     Follow up with your pulmonologist.     Take all prescribed home meds.

## 2023-09-01 ENCOUNTER — APPOINTMENT (OUTPATIENT)
Dept: CARDIOLOGY | Facility: CLINIC | Age: 68
End: 2023-09-01
Payer: MEDICARE

## 2023-09-01 VITALS
HEART RATE: 79 BPM | BODY MASS INDEX: 40.36 KG/M2 | SYSTOLIC BLOOD PRESSURE: 102 MMHG | WEIGHT: 298 LBS | DIASTOLIC BLOOD PRESSURE: 63 MMHG | OXYGEN SATURATION: 94 % | HEIGHT: 72 IN

## 2023-09-01 DIAGNOSIS — I27.20 PULMONARY HYPERTENSION, UNSPECIFIED: ICD-10-CM

## 2023-09-01 PROCEDURE — 99213 OFFICE O/P EST LOW 20 MIN: CPT

## 2023-09-08 NOTE — HISTORY OF PRESENT ILLNESS
[FreeTextEntry1] : 67 y/o male FpEF with a hx of HTN, pHTN paroxysmal atrial fibrillation s/p Watchman, mod MR, PAD s/p L BKA,  COPD (on 4 L of home O2),  ETOH induced cirrhosis, trach, who presents to  on 7/8 with 1 day history of difficulty breathing, productive cough, wheezing. Appears overloaded on exam with normal BPs   TTE 5/30/23:  EF 60-65%, LVEDd 6.16 IVS 1.33 PWd 1.32 LA Moderately dilated, moderate MR, mild to moderate TR, RSVP 33.     Problem/Plan - 1:   Problem: (HFpEF) heart failure with preserved ejection fraction.    Plan: - Volume status improving - Start Farxiga 10 mg po daily tomorrow  - Decrease lasix to 40 mg PO BID - Decrease spironolactone to 25 mg po daily - amyloid workup: Kappa/Lambda ratio near normal, immunofixation pending.   - 2L fluid restriction - strict I & Os - Daily standing weights.   Problem/Plan - 2:   Problem: Chronic atrial fibrillation.    Plan: - on diltiazem 120 mg po daily - on metoprolol tartrate 25 mg po BID - Has watchman device.

## 2023-09-13 ENCOUNTER — APPOINTMENT (OUTPATIENT)
Dept: CARDIOLOGY | Facility: CLINIC | Age: 68
End: 2023-09-13

## 2023-09-18 PROBLEM — I27.20 PULMONARY HYPERTENSION, MODERATE TO SEVERE: Status: ACTIVE | Noted: 2021-08-06

## 2023-10-04 ENCOUNTER — RX RENEWAL (OUTPATIENT)
Age: 68
End: 2023-10-04

## 2023-10-14 NOTE — DISCHARGE NOTE PROVIDER - NSDCADMDATE_GEN_ALL_CORE_FT
Physical Therapy    Patient not seen in therapy.     Unavailable due to sleeping soundly/unarousable.      Physical therapy is not appropriate at this time.  Will follow as level of alertness improves. Rn is aware       OBJECTIVE                      Documented in the chart in the following areas: Assessment/Plan.        Therapy procedure time and total treatment time can be found documented on the Time Entry flowsheet   08-Jul-2023 13:14

## 2023-10-18 ENCOUNTER — RX RENEWAL (OUTPATIENT)
Age: 68
End: 2023-10-18

## 2023-10-30 ENCOUNTER — RX RENEWAL (OUTPATIENT)
Age: 68
End: 2023-10-30

## 2023-11-12 ENCOUNTER — RX RENEWAL (OUTPATIENT)
Age: 68
End: 2023-11-12

## 2023-11-15 ENCOUNTER — APPOINTMENT (OUTPATIENT)
Dept: CARDIOLOGY | Facility: CLINIC | Age: 68
End: 2023-11-15
Payer: MEDICARE

## 2023-11-15 VITALS
RESPIRATION RATE: 16 BRPM | SYSTOLIC BLOOD PRESSURE: 116 MMHG | DIASTOLIC BLOOD PRESSURE: 64 MMHG | HEIGHT: 72 IN | OXYGEN SATURATION: 98 % | WEIGHT: 298 LBS | BODY MASS INDEX: 40.36 KG/M2 | HEART RATE: 64 BPM

## 2023-11-15 PROCEDURE — 93000 ELECTROCARDIOGRAM COMPLETE: CPT

## 2023-11-15 PROCEDURE — 99214 OFFICE O/P EST MOD 30 MIN: CPT

## 2023-11-15 RX ORDER — DOXAZOSIN 1 MG/1
1 TABLET ORAL DAILY
Qty: 90 | Refills: 3 | Status: DISCONTINUED | COMMUNITY
Start: 2021-02-06 | End: 2023-11-15

## 2023-11-16 NOTE — ED ADULT NURSE NOTE - EXTENSIONS OF SELF_ADULT
"Assessment & Plan     Pilonidal cyst   Will have mom remove 1/2 tomorrow and remove the remainder Friday.  Should follow-up for worsening drainage, redness, pain, fever.  Pt agreeable with plan.  Follow-up with general surgery as planned.             [facility]    Renan Noriega is a 22 year old male who presents to clinic today for the following health issues:  Chief Complaint   Patient presents with    Cyst     History of Present Illness       Reason for visit:  Follow up vist for cyst packing    He eats 0-1 servings of fruits and vegetables daily.He consumes 4 sweetened beverage(s) daily.He exercises with enough effort to increase his heart rate 30 to 60 minutes per day.  He exercises with enough effort to increase his heart rate 3 or less days per week.   He is taking medications regularly.  Pt notes less pain. Wound drainage has been minimal. No fever.   Tolerating augmentin    Did get an appt with General surgery for next week.      Review of Systems  Constitutional, HEENT, cardiovascular, pulmonary, gi and gu systems are negative, except as otherwise noted.      Objective    /78 (BP Location: Right arm, Patient Position: Sitting, Cuff Size: Adult Regular)   Pulse 69   Temp 98.7  F (37.1  C) (Oral)   Resp 16   Ht 1.734 m (5' 8.27\")   Wt 72 kg (158 lb 12.8 oz)   SpO2 98%   BMI 23.96 kg/m    Physical Exam   Exam of the buttock reveals no erythema surrounding the wound. There is mild induration just R of the gluteal fold and mild induration just L lateral of the incision.    Packing removed without difficulty with small amount of thin yellow discharge and irrigated with sterile water.  Repacked with approximately 10 cm of 1/4 packing tape.  Pt tolerated well.             " None

## 2023-11-17 ENCOUNTER — INPATIENT (INPATIENT)
Facility: HOSPITAL | Age: 68
LOS: 11 days | Discharge: HOME HEALTH | DRG: 64 | End: 2023-11-29
Attending: HOSPITALIST | Admitting: STUDENT IN AN ORGANIZED HEALTH CARE EDUCATION/TRAINING PROGRAM
Payer: MEDICARE

## 2023-11-17 VITALS
SYSTOLIC BLOOD PRESSURE: 115 MMHG | DIASTOLIC BLOOD PRESSURE: 56 MMHG | HEIGHT: 65 IN | WEIGHT: 199.96 LBS | TEMPERATURE: 97 F | HEART RATE: 63 BPM | RESPIRATION RATE: 18 BRPM | OXYGEN SATURATION: 95 %

## 2023-11-17 DIAGNOSIS — S88.119A COMPLETE TRAUMATIC AMPUTATION AT LEVEL BETWEEN KNEE AND ANKLE, UNSPECIFIED LOWER LEG, INITIAL ENCOUNTER: Chronic | ICD-10-CM

## 2023-11-17 DIAGNOSIS — Z98.890 OTHER SPECIFIED POSTPROCEDURAL STATES: Chronic | ICD-10-CM

## 2023-11-17 DIAGNOSIS — Z95.818 PRESENCE OF OTHER CARDIAC IMPLANTS AND GRAFTS: Chronic | ICD-10-CM

## 2023-11-17 DIAGNOSIS — Z89.512 ACQUIRED ABSENCE OF LEFT LEG BELOW KNEE: Chronic | ICD-10-CM

## 2023-11-17 DIAGNOSIS — Z93.1 GASTROSTOMY STATUS: Chronic | ICD-10-CM

## 2023-11-17 LAB
BASOPHILS # BLD AUTO: 0.02 K/UL — SIGNIFICANT CHANGE UP (ref 0–0.2)
BASOPHILS # BLD AUTO: 0.02 K/UL — SIGNIFICANT CHANGE UP (ref 0–0.2)
BASOPHILS NFR BLD AUTO: 0.2 % — SIGNIFICANT CHANGE UP (ref 0–2)
BASOPHILS NFR BLD AUTO: 0.2 % — SIGNIFICANT CHANGE UP (ref 0–2)
EOSINOPHIL # BLD AUTO: 0.06 K/UL — SIGNIFICANT CHANGE UP (ref 0–0.5)
EOSINOPHIL # BLD AUTO: 0.06 K/UL — SIGNIFICANT CHANGE UP (ref 0–0.5)
EOSINOPHIL NFR BLD AUTO: 0.7 % — SIGNIFICANT CHANGE UP (ref 0–6)
EOSINOPHIL NFR BLD AUTO: 0.7 % — SIGNIFICANT CHANGE UP (ref 0–6)
HCT VFR BLD CALC: 33.6 % — LOW (ref 39–50)
HCT VFR BLD CALC: 33.6 % — LOW (ref 39–50)
HGB BLD-MCNC: 10.8 G/DL — LOW (ref 13–17)
HGB BLD-MCNC: 10.8 G/DL — LOW (ref 13–17)
IMM GRANULOCYTES NFR BLD AUTO: 0.7 % — SIGNIFICANT CHANGE UP (ref 0–0.9)
IMM GRANULOCYTES NFR BLD AUTO: 0.7 % — SIGNIFICANT CHANGE UP (ref 0–0.9)
LYMPHOCYTES # BLD AUTO: 0.64 K/UL — LOW (ref 1–3.3)
LYMPHOCYTES # BLD AUTO: 0.64 K/UL — LOW (ref 1–3.3)
LYMPHOCYTES # BLD AUTO: 7.6 % — LOW (ref 13–44)
LYMPHOCYTES # BLD AUTO: 7.6 % — LOW (ref 13–44)
MCHC RBC-ENTMCNC: 28.3 PG — SIGNIFICANT CHANGE UP (ref 27–34)
MCHC RBC-ENTMCNC: 28.3 PG — SIGNIFICANT CHANGE UP (ref 27–34)
MCHC RBC-ENTMCNC: 32.1 GM/DL — SIGNIFICANT CHANGE UP (ref 32–36)
MCHC RBC-ENTMCNC: 32.1 GM/DL — SIGNIFICANT CHANGE UP (ref 32–36)
MCV RBC AUTO: 88.2 FL — SIGNIFICANT CHANGE UP (ref 80–100)
MCV RBC AUTO: 88.2 FL — SIGNIFICANT CHANGE UP (ref 80–100)
MONOCYTES # BLD AUTO: 0.54 K/UL — SIGNIFICANT CHANGE UP (ref 0–0.9)
MONOCYTES # BLD AUTO: 0.54 K/UL — SIGNIFICANT CHANGE UP (ref 0–0.9)
MONOCYTES NFR BLD AUTO: 6.5 % — SIGNIFICANT CHANGE UP (ref 2–14)
MONOCYTES NFR BLD AUTO: 6.5 % — SIGNIFICANT CHANGE UP (ref 2–14)
NEUTROPHILS # BLD AUTO: 7.05 K/UL — SIGNIFICANT CHANGE UP (ref 1.8–7.4)
NEUTROPHILS # BLD AUTO: 7.05 K/UL — SIGNIFICANT CHANGE UP (ref 1.8–7.4)
NEUTROPHILS NFR BLD AUTO: 84.3 % — HIGH (ref 43–77)
NEUTROPHILS NFR BLD AUTO: 84.3 % — HIGH (ref 43–77)
PLATELET # BLD AUTO: 171 K/UL — SIGNIFICANT CHANGE UP (ref 150–400)
PLATELET # BLD AUTO: 171 K/UL — SIGNIFICANT CHANGE UP (ref 150–400)
RBC # BLD: 3.81 M/UL — LOW (ref 4.2–5.8)
RBC # BLD: 3.81 M/UL — LOW (ref 4.2–5.8)
RBC # FLD: 16.1 % — HIGH (ref 10.3–14.5)
RBC # FLD: 16.1 % — HIGH (ref 10.3–14.5)
WBC # BLD: 8.37 K/UL — SIGNIFICANT CHANGE UP (ref 3.8–10.5)
WBC # BLD: 8.37 K/UL — SIGNIFICANT CHANGE UP (ref 3.8–10.5)
WBC # FLD AUTO: 8.37 K/UL — SIGNIFICANT CHANGE UP (ref 3.8–10.5)
WBC # FLD AUTO: 8.37 K/UL — SIGNIFICANT CHANGE UP (ref 3.8–10.5)

## 2023-11-17 PROCEDURE — 80061 LIPID PANEL: CPT

## 2023-11-17 PROCEDURE — 94660 CPAP INITIATION&MGMT: CPT

## 2023-11-17 PROCEDURE — 93880 EXTRACRANIAL BILAT STUDY: CPT

## 2023-11-17 PROCEDURE — 93325 DOPPLER ECHO COLOR FLOW MAPG: CPT

## 2023-11-17 PROCEDURE — 71250 CT THORAX DX C-: CPT

## 2023-11-17 PROCEDURE — 86431 RHEUMATOID FACTOR QUANT: CPT

## 2023-11-17 PROCEDURE — 83880 ASSAY OF NATRIURETIC PEPTIDE: CPT

## 2023-11-17 PROCEDURE — 85652 RBC SED RATE AUTOMATED: CPT

## 2023-11-17 PROCEDURE — 93312 ECHO TRANSESOPHAGEAL: CPT

## 2023-11-17 PROCEDURE — 86162 COMPLEMENT TOTAL (CH50): CPT

## 2023-11-17 PROCEDURE — 86160 COMPLEMENT ANTIGEN: CPT

## 2023-11-17 PROCEDURE — 85025 COMPLETE CBC W/AUTO DIFF WBC: CPT

## 2023-11-17 PROCEDURE — 85027 COMPLETE CBC AUTOMATED: CPT

## 2023-11-17 PROCEDURE — 94640 AIRWAY INHALATION TREATMENT: CPT

## 2023-11-17 PROCEDURE — 82803 BLOOD GASES ANY COMBINATION: CPT

## 2023-11-17 PROCEDURE — 86235 NUCLEAR ANTIGEN ANTIBODY: CPT

## 2023-11-17 PROCEDURE — 93010 ELECTROCARDIOGRAM REPORT: CPT

## 2023-11-17 PROCEDURE — 36415 COLL VENOUS BLD VENIPUNCTURE: CPT

## 2023-11-17 PROCEDURE — 86225 DNA ANTIBODY NATIVE: CPT

## 2023-11-17 PROCEDURE — 86038 ANTINUCLEAR ANTIBODIES: CPT

## 2023-11-17 PROCEDURE — 80048 BASIC METABOLIC PNL TOTAL CA: CPT

## 2023-11-17 PROCEDURE — 71045 X-RAY EXAM CHEST 1 VIEW: CPT

## 2023-11-17 PROCEDURE — 83036 HEMOGLOBIN GLYCOSYLATED A1C: CPT

## 2023-11-17 PROCEDURE — 97163 PT EVAL HIGH COMPLEX 45 MIN: CPT | Mod: GP

## 2023-11-17 PROCEDURE — 99285 EMERGENCY DEPT VISIT HI MDM: CPT

## 2023-11-17 PROCEDURE — 70551 MRI BRAIN STEM W/O DYE: CPT

## 2023-11-17 PROCEDURE — 93320 DOPPLER ECHO COMPLETE: CPT

## 2023-11-17 PROCEDURE — 81003 URINALYSIS AUTO W/O SCOPE: CPT

## 2023-11-17 PROCEDURE — 97116 GAIT TRAINING THERAPY: CPT | Mod: GP

## 2023-11-17 RX ORDER — SODIUM CHLORIDE 9 MG/ML
500 INJECTION INTRAMUSCULAR; INTRAVENOUS; SUBCUTANEOUS ONCE
Refills: 0 | Status: COMPLETED | OUTPATIENT
Start: 2023-11-17 | End: 2023-11-17

## 2023-11-17 RX ORDER — MECLIZINE HCL 12.5 MG
25 TABLET ORAL ONCE
Refills: 0 | Status: COMPLETED | OUTPATIENT
Start: 2023-11-17 | End: 2023-11-17

## 2023-11-17 RX ORDER — ONDANSETRON 8 MG/1
4 TABLET, FILM COATED ORAL ONCE
Refills: 0 | Status: COMPLETED | OUTPATIENT
Start: 2023-11-17 | End: 2023-11-17

## 2023-11-17 RX ADMIN — SODIUM CHLORIDE 500 MILLILITER(S): 9 INJECTION INTRAMUSCULAR; INTRAVENOUS; SUBCUTANEOUS at 23:33

## 2023-11-17 RX ADMIN — Medication 25 MILLIGRAM(S): at 23:33

## 2023-11-17 RX ADMIN — ONDANSETRON 4 MILLIGRAM(S): 8 TABLET, FILM COATED ORAL at 23:33

## 2023-11-17 NOTE — ED ADULT NURSE NOTE - OBJECTIVE STATEMENT
69 y/o male presents to ED c/o dizziness and nausea. Pt states that symptoms started 2 hours ago. Pt reports vomiting x3 times. Pt states that symptoms feel similar to ototoxicity that he has had in the past. Pt denies chest pain, diarrhea, urinary changes, numbness/tingling. PMHx afib, COPD - pt wears 2: N.C. at home. PSHx left below the knee amputatiuon. Pt placed on tele monitor, afib rhythm in 60s. Pt satting 100% on @ 2L N.C. Safety measures in place, stretcher locked and in lowest position.

## 2023-11-17 NOTE — ED PROVIDER NOTE - OBJECTIVE STATEMENT
69 y/o male with a PMHx of anemia, Afib, CHF, COPD, cirrhosis, collapsed lung, emphysema  EtOH abuse, EtOH withdrawal, falls, GERD, HTN, meningitis, presence of Watchman left atrial appendage closure device p/w dizziness. Pt reports he has had similar issues before due to ototoxicity from an antibiotic. Symptoms started suddenly while at home today while walking, patient sat himself down, denies falls or injuries.  Reports constant dizziness and spinning sensation associated with nausea, no vomiting.  Also reports mild headache and dry mouth

## 2023-11-17 NOTE — ED ADULT NURSE NOTE - NSFALLHARMRISKINTERV_ED_ALL_ED

## 2023-11-17 NOTE — ED ADULT TRIAGE NOTE - CHIEF COMPLAINT QUOTE
patient c/o dizziness, nausea and SOB starting  1 hours ago. patient states this has happen in the past duet o his ear pressure.  patient denies headaches or visual changes.

## 2023-11-17 NOTE — ED PROVIDER NOTE - PHYSICAL EXAMINATION
General: AAOx3, NAD  HEENT: NCAT, PERRLA, EOMI  Cardiac: Normal rate, no murmurs, normal peripheral perfusion  Respiratory: Normal rate and effort. CTAB  GI: Soft, nondistended, nontender  Neuro: No focal deficits. MARIE equally x4, sensation to light touch intact throughout. CNII-XII grossly intact. No abnormal vertical nystagmus seen. No dysmetria on finger-to-nose testing. Speech clear. NIHSS=0  MSK: FROMx4, no focal bony tenderness. s/p L BKA  Skin: No rash

## 2023-11-17 NOTE — ED PROVIDER NOTE - EKG ADDITIONAL INFORMATION FREE TEXT
EKG independently interpreted by self: Afib at 57 bpm. Normal axis, normal intervals, no acute ischemic changes.

## 2023-11-17 NOTE — ED ADULT NURSE REASSESSMENT NOTE - NS ED NURSE REASSESS COMMENT FT1
Pt on tele monitor, Sinus bradycardia HR dropped to 38 bpm non-sustaining. Currently 57 bpm irregular rate. No acute distress noted. Pt does not report chest pain. MD Benjamin made aware.

## 2023-11-18 DIAGNOSIS — R42 DIZZINESS AND GIDDINESS: ICD-10-CM

## 2023-11-18 LAB
ALBUMIN SERPL ELPH-MCNC: 2.7 G/DL — LOW (ref 3.3–5)
ALBUMIN SERPL ELPH-MCNC: 2.7 G/DL — LOW (ref 3.3–5)
ALP SERPL-CCNC: 84 U/L — SIGNIFICANT CHANGE UP (ref 40–120)
ALP SERPL-CCNC: 84 U/L — SIGNIFICANT CHANGE UP (ref 40–120)
ALT FLD-CCNC: 13 U/L — SIGNIFICANT CHANGE UP (ref 12–78)
ALT FLD-CCNC: 13 U/L — SIGNIFICANT CHANGE UP (ref 12–78)
ANION GAP SERPL CALC-SCNC: 6 MMOL/L — SIGNIFICANT CHANGE UP (ref 5–17)
ANION GAP SERPL CALC-SCNC: 6 MMOL/L — SIGNIFICANT CHANGE UP (ref 5–17)
APPEARANCE UR: CLEAR — SIGNIFICANT CHANGE UP
APPEARANCE UR: CLEAR — SIGNIFICANT CHANGE UP
APTT BLD: 31.9 SEC — SIGNIFICANT CHANGE UP (ref 24.5–35.6)
APTT BLD: 31.9 SEC — SIGNIFICANT CHANGE UP (ref 24.5–35.6)
AST SERPL-CCNC: 24 U/L — SIGNIFICANT CHANGE UP (ref 15–37)
AST SERPL-CCNC: 24 U/L — SIGNIFICANT CHANGE UP (ref 15–37)
BILIRUB SERPL-MCNC: 0.4 MG/DL — SIGNIFICANT CHANGE UP (ref 0.2–1.2)
BILIRUB SERPL-MCNC: 0.4 MG/DL — SIGNIFICANT CHANGE UP (ref 0.2–1.2)
BILIRUB UR-MCNC: NEGATIVE — SIGNIFICANT CHANGE UP
BILIRUB UR-MCNC: NEGATIVE — SIGNIFICANT CHANGE UP
BUN SERPL-MCNC: 11 MG/DL — SIGNIFICANT CHANGE UP (ref 7–23)
BUN SERPL-MCNC: 11 MG/DL — SIGNIFICANT CHANGE UP (ref 7–23)
CALCIUM SERPL-MCNC: 8.4 MG/DL — LOW (ref 8.5–10.1)
CALCIUM SERPL-MCNC: 8.4 MG/DL — LOW (ref 8.5–10.1)
CHLORIDE SERPL-SCNC: 101 MMOL/L — SIGNIFICANT CHANGE UP (ref 96–108)
CHLORIDE SERPL-SCNC: 101 MMOL/L — SIGNIFICANT CHANGE UP (ref 96–108)
CO2 SERPL-SCNC: 29 MMOL/L — SIGNIFICANT CHANGE UP (ref 22–31)
CO2 SERPL-SCNC: 29 MMOL/L — SIGNIFICANT CHANGE UP (ref 22–31)
COLOR SPEC: YELLOW — SIGNIFICANT CHANGE UP
COLOR SPEC: YELLOW — SIGNIFICANT CHANGE UP
CREAT SERPL-MCNC: 0.77 MG/DL — SIGNIFICANT CHANGE UP (ref 0.5–1.3)
CREAT SERPL-MCNC: 0.77 MG/DL — SIGNIFICANT CHANGE UP (ref 0.5–1.3)
DIFF PNL FLD: NEGATIVE — SIGNIFICANT CHANGE UP
DIFF PNL FLD: NEGATIVE — SIGNIFICANT CHANGE UP
EGFR: 99 ML/MIN/1.73M2 — SIGNIFICANT CHANGE UP
EGFR: 99 ML/MIN/1.73M2 — SIGNIFICANT CHANGE UP
GLUCOSE SERPL-MCNC: 136 MG/DL — HIGH (ref 70–99)
GLUCOSE SERPL-MCNC: 136 MG/DL — HIGH (ref 70–99)
GLUCOSE UR QL: >=1000 MG/DL
GLUCOSE UR QL: >=1000 MG/DL
INR BLD: 1.08 RATIO — SIGNIFICANT CHANGE UP (ref 0.85–1.18)
INR BLD: 1.08 RATIO — SIGNIFICANT CHANGE UP (ref 0.85–1.18)
KETONES UR-MCNC: ABNORMAL MG/DL
KETONES UR-MCNC: ABNORMAL MG/DL
LEUKOCYTE ESTERASE UR-ACNC: NEGATIVE — SIGNIFICANT CHANGE UP
LEUKOCYTE ESTERASE UR-ACNC: NEGATIVE — SIGNIFICANT CHANGE UP
NITRITE UR-MCNC: NEGATIVE — SIGNIFICANT CHANGE UP
NITRITE UR-MCNC: NEGATIVE — SIGNIFICANT CHANGE UP
PH UR: 6 — SIGNIFICANT CHANGE UP (ref 5–8)
PH UR: 6 — SIGNIFICANT CHANGE UP (ref 5–8)
POTASSIUM SERPL-MCNC: 4.2 MMOL/L — SIGNIFICANT CHANGE UP (ref 3.5–5.3)
POTASSIUM SERPL-MCNC: 4.2 MMOL/L — SIGNIFICANT CHANGE UP (ref 3.5–5.3)
POTASSIUM SERPL-SCNC: 4.2 MMOL/L — SIGNIFICANT CHANGE UP (ref 3.5–5.3)
POTASSIUM SERPL-SCNC: 4.2 MMOL/L — SIGNIFICANT CHANGE UP (ref 3.5–5.3)
PROT SERPL-MCNC: 7 GM/DL — SIGNIFICANT CHANGE UP (ref 6–8.3)
PROT SERPL-MCNC: 7 GM/DL — SIGNIFICANT CHANGE UP (ref 6–8.3)
PROT UR-MCNC: NEGATIVE MG/DL — SIGNIFICANT CHANGE UP
PROT UR-MCNC: NEGATIVE MG/DL — SIGNIFICANT CHANGE UP
PROTHROM AB SERPL-ACNC: 12.2 SEC — SIGNIFICANT CHANGE UP (ref 9.5–13)
PROTHROM AB SERPL-ACNC: 12.2 SEC — SIGNIFICANT CHANGE UP (ref 9.5–13)
SODIUM SERPL-SCNC: 136 MMOL/L — SIGNIFICANT CHANGE UP (ref 135–145)
SODIUM SERPL-SCNC: 136 MMOL/L — SIGNIFICANT CHANGE UP (ref 135–145)
SP GR SPEC: >1.03 — HIGH (ref 1–1.03)
SP GR SPEC: >1.03 — HIGH (ref 1–1.03)
UROBILINOGEN FLD QL: 0.2 MG/DL — SIGNIFICANT CHANGE UP (ref 0.2–1)
UROBILINOGEN FLD QL: 0.2 MG/DL — SIGNIFICANT CHANGE UP (ref 0.2–1)

## 2023-11-18 PROCEDURE — 70496 CT ANGIOGRAPHY HEAD: CPT | Mod: 26,MA

## 2023-11-18 PROCEDURE — 93880 EXTRACRANIAL BILAT STUDY: CPT | Mod: 26

## 2023-11-18 PROCEDURE — 70551 MRI BRAIN STEM W/O DYE: CPT | Mod: 26

## 2023-11-18 PROCEDURE — 70498 CT ANGIOGRAPHY NECK: CPT | Mod: 26,MA

## 2023-11-18 PROCEDURE — 99221 1ST HOSP IP/OBS SF/LOW 40: CPT

## 2023-11-18 PROCEDURE — 99223 1ST HOSP IP/OBS HIGH 75: CPT

## 2023-11-18 PROCEDURE — 99222 1ST HOSP IP/OBS MODERATE 55: CPT

## 2023-11-18 RX ORDER — TIOTROPIUM BROMIDE 18 UG/1
2 CAPSULE ORAL; RESPIRATORY (INHALATION) DAILY
Refills: 0 | Status: DISCONTINUED | OUTPATIENT
Start: 2023-11-18 | End: 2023-11-29

## 2023-11-18 RX ORDER — IBUPROFEN 200 MG
400 TABLET ORAL ONCE
Refills: 0 | Status: COMPLETED | OUTPATIENT
Start: 2023-11-18 | End: 2023-11-18

## 2023-11-18 RX ORDER — FOLIC ACID 0.8 MG
1 TABLET ORAL DAILY
Refills: 0 | Status: DISCONTINUED | OUTPATIENT
Start: 2023-11-18 | End: 2023-11-29

## 2023-11-18 RX ORDER — PSYLLIUM SEED (WITH DEXTROSE)
2 POWDER (GRAM) ORAL DAILY
Refills: 0 | Status: DISCONTINUED | OUTPATIENT
Start: 2023-11-18 | End: 2023-11-29

## 2023-11-18 RX ORDER — ENOXAPARIN SODIUM 100 MG/ML
90 INJECTION SUBCUTANEOUS EVERY 12 HOURS
Refills: 0 | Status: DISCONTINUED | OUTPATIENT
Start: 2023-11-18 | End: 2023-11-21

## 2023-11-18 RX ORDER — FUROSEMIDE 40 MG
40 TABLET ORAL DAILY
Refills: 0 | Status: DISCONTINUED | OUTPATIENT
Start: 2023-11-18 | End: 2023-11-19

## 2023-11-18 RX ORDER — ASPIRIN/CALCIUM CARB/MAGNESIUM 324 MG
81 TABLET ORAL DAILY
Refills: 0 | Status: DISCONTINUED | OUTPATIENT
Start: 2023-11-18 | End: 2023-11-19

## 2023-11-18 RX ORDER — METOPROLOL TARTRATE 50 MG
25 TABLET ORAL
Refills: 0 | Status: DISCONTINUED | OUTPATIENT
Start: 2023-11-18 | End: 2023-11-29

## 2023-11-18 RX ORDER — CLOPIDOGREL BISULFATE 75 MG/1
75 TABLET, FILM COATED ORAL DAILY
Refills: 0 | Status: DISCONTINUED | OUTPATIENT
Start: 2023-11-18 | End: 2023-11-29

## 2023-11-18 RX ORDER — ASPIRIN/CALCIUM CARB/MAGNESIUM 324 MG
324 TABLET ORAL ONCE
Refills: 0 | Status: COMPLETED | OUTPATIENT
Start: 2023-11-18 | End: 2023-11-18

## 2023-11-18 RX ORDER — ACETAMINOPHEN 500 MG
650 TABLET ORAL EVERY 6 HOURS
Refills: 0 | Status: DISCONTINUED | OUTPATIENT
Start: 2023-11-18 | End: 2023-11-29

## 2023-11-18 RX ORDER — ALBUTEROL 90 UG/1
2 AEROSOL, METERED ORAL EVERY 6 HOURS
Refills: 0 | Status: DISCONTINUED | OUTPATIENT
Start: 2023-11-18 | End: 2023-11-27

## 2023-11-18 RX ORDER — SPIRONOLACTONE 25 MG/1
50 TABLET, FILM COATED ORAL DAILY
Refills: 0 | Status: DISCONTINUED | OUTPATIENT
Start: 2023-11-18 | End: 2023-11-29

## 2023-11-18 RX ORDER — DIAZEPAM 5 MG
2 TABLET ORAL ONCE
Refills: 0 | Status: DISCONTINUED | OUTPATIENT
Start: 2023-11-18 | End: 2023-11-18

## 2023-11-18 RX ORDER — MECLIZINE HCL 12.5 MG
25 TABLET ORAL EVERY 8 HOURS
Refills: 0 | Status: DISCONTINUED | OUTPATIENT
Start: 2023-11-18 | End: 2023-11-29

## 2023-11-18 RX ORDER — BUDESONIDE AND FORMOTEROL FUMARATE DIHYDRATE 160; 4.5 UG/1; UG/1
2 AEROSOL RESPIRATORY (INHALATION)
Refills: 0 | Status: DISCONTINUED | OUTPATIENT
Start: 2023-11-18 | End: 2023-11-26

## 2023-11-18 RX ORDER — ATORVASTATIN CALCIUM 80 MG/1
80 TABLET, FILM COATED ORAL AT BEDTIME
Refills: 0 | Status: DISCONTINUED | OUTPATIENT
Start: 2023-11-18 | End: 2023-11-29

## 2023-11-18 RX ORDER — PREGABALIN 225 MG/1
1000 CAPSULE ORAL DAILY
Refills: 0 | Status: DISCONTINUED | OUTPATIENT
Start: 2023-11-18 | End: 2023-11-29

## 2023-11-18 RX ORDER — THIAMINE MONONITRATE (VIT B1) 100 MG
100 TABLET ORAL DAILY
Refills: 0 | Status: DISCONTINUED | OUTPATIENT
Start: 2023-11-18 | End: 2023-11-29

## 2023-11-18 RX ORDER — DOXAZOSIN MESYLATE 4 MG
1 TABLET ORAL AT BEDTIME
Refills: 0 | Status: DISCONTINUED | OUTPATIENT
Start: 2023-11-18 | End: 2023-11-29

## 2023-11-18 RX ORDER — GABAPENTIN 400 MG/1
400 CAPSULE ORAL THREE TIMES A DAY
Refills: 0 | Status: DISCONTINUED | OUTPATIENT
Start: 2023-11-18 | End: 2023-11-29

## 2023-11-18 RX ORDER — QUETIAPINE FUMARATE 200 MG/1
100 TABLET, FILM COATED ORAL AT BEDTIME
Refills: 0 | Status: DISCONTINUED | OUTPATIENT
Start: 2023-11-18 | End: 2023-11-29

## 2023-11-18 RX ORDER — DILTIAZEM HCL 120 MG
180 CAPSULE, EXT RELEASE 24 HR ORAL DAILY
Refills: 0 | Status: DISCONTINUED | OUTPATIENT
Start: 2023-11-18 | End: 2023-11-29

## 2023-11-18 RX ORDER — ONDANSETRON 8 MG/1
4 TABLET, FILM COATED ORAL EVERY 6 HOURS
Refills: 0 | Status: DISCONTINUED | OUTPATIENT
Start: 2023-11-18 | End: 2023-11-29

## 2023-11-18 RX ORDER — INFLUENZA VIRUS VACCINE 15; 15; 15; 15 UG/.5ML; UG/.5ML; UG/.5ML; UG/.5ML
0.7 SUSPENSION INTRAMUSCULAR ONCE
Refills: 0 | Status: DISCONTINUED | OUTPATIENT
Start: 2023-11-18 | End: 2023-11-29

## 2023-11-18 RX ORDER — PANTOPRAZOLE SODIUM 20 MG/1
40 TABLET, DELAYED RELEASE ORAL
Refills: 0 | Status: DISCONTINUED | OUTPATIENT
Start: 2023-11-18 | End: 2023-11-29

## 2023-11-18 RX ORDER — TRAMADOL HYDROCHLORIDE 50 MG/1
25 TABLET ORAL ONCE
Refills: 0 | Status: DISCONTINUED | OUTPATIENT
Start: 2023-11-18 | End: 2023-11-18

## 2023-11-18 RX ADMIN — TRAMADOL HYDROCHLORIDE 25 MILLIGRAM(S): 50 TABLET ORAL at 13:55

## 2023-11-18 RX ADMIN — ENOXAPARIN SODIUM 90 MILLIGRAM(S): 100 INJECTION SUBCUTANEOUS at 21:44

## 2023-11-18 RX ADMIN — Medication 400 MILLIGRAM(S): at 13:56

## 2023-11-18 RX ADMIN — Medication 1 MILLIGRAM(S): at 21:42

## 2023-11-18 RX ADMIN — GABAPENTIN 400 MILLIGRAM(S): 400 CAPSULE ORAL at 13:50

## 2023-11-18 RX ADMIN — BUDESONIDE AND FORMOTEROL FUMARATE DIHYDRATE 2 PUFF(S): 160; 4.5 AEROSOL RESPIRATORY (INHALATION) at 21:13

## 2023-11-18 RX ADMIN — ALBUTEROL 2 PUFF(S): 90 AEROSOL, METERED ORAL at 21:11

## 2023-11-18 RX ADMIN — Medication 650 MILLIGRAM(S): at 05:13

## 2023-11-18 RX ADMIN — Medication 650 MILLIGRAM(S): at 11:53

## 2023-11-18 RX ADMIN — Medication 81 MILLIGRAM(S): at 11:02

## 2023-11-18 RX ADMIN — ATORVASTATIN CALCIUM 80 MILLIGRAM(S): 80 TABLET, FILM COATED ORAL at 21:44

## 2023-11-18 RX ADMIN — Medication 1 MILLIGRAM(S): at 11:01

## 2023-11-18 RX ADMIN — Medication 324 MILLIGRAM(S): at 03:52

## 2023-11-18 RX ADMIN — GABAPENTIN 400 MILLIGRAM(S): 400 CAPSULE ORAL at 06:41

## 2023-11-18 RX ADMIN — PREGABALIN 1000 MICROGRAM(S): 225 CAPSULE ORAL at 11:03

## 2023-11-18 RX ADMIN — Medication 180 MILLIGRAM(S): at 11:02

## 2023-11-18 RX ADMIN — Medication 25 MILLIGRAM(S): at 07:07

## 2023-11-18 RX ADMIN — Medication 10 MILLIGRAM(S): at 21:42

## 2023-11-18 RX ADMIN — Medication 650 MILLIGRAM(S): at 11:54

## 2023-11-18 RX ADMIN — ONDANSETRON 4 MILLIGRAM(S): 8 TABLET, FILM COATED ORAL at 07:07

## 2023-11-18 RX ADMIN — QUETIAPINE FUMARATE 100 MILLIGRAM(S): 200 TABLET, FILM COATED ORAL at 21:43

## 2023-11-18 RX ADMIN — CLOPIDOGREL BISULFATE 75 MILLIGRAM(S): 75 TABLET, FILM COATED ORAL at 13:56

## 2023-11-18 RX ADMIN — TRAMADOL HYDROCHLORIDE 25 MILLIGRAM(S): 50 TABLET ORAL at 13:59

## 2023-11-18 RX ADMIN — Medication 2 MILLIGRAM(S): at 03:52

## 2023-11-18 RX ADMIN — ALBUTEROL 2 PUFF(S): 90 AEROSOL, METERED ORAL at 11:01

## 2023-11-18 RX ADMIN — Medication 25 MILLIGRAM(S): at 11:02

## 2023-11-18 RX ADMIN — Medication 10 MILLIGRAM(S): at 11:01

## 2023-11-18 RX ADMIN — Medication 25 MILLIGRAM(S): at 21:43

## 2023-11-18 RX ADMIN — Medication 1 TABLET(S): at 11:02

## 2023-11-18 RX ADMIN — Medication 400 MILLIGRAM(S): at 13:58

## 2023-11-18 RX ADMIN — GABAPENTIN 400 MILLIGRAM(S): 400 CAPSULE ORAL at 21:43

## 2023-11-18 RX ADMIN — PANTOPRAZOLE SODIUM 40 MILLIGRAM(S): 20 TABLET, DELAYED RELEASE ORAL at 06:41

## 2023-11-18 RX ADMIN — Medication 100 MILLIGRAM(S): at 11:02

## 2023-11-18 NOTE — CONSULT NOTE ADULT - ASSESSMENT
69 yo R ICA stenosis  dizziness unlikely from R ICA stenosis    Patient can follow up outpatient with Dr Perez   Continue ASA and statin    Discussed with Dr Perez 67 yo R ICA stenosis  dizziness unlikely from R ICA stenosis    Patient can follow up outpatient with Dr Perez   Continue ASA and statin    Discussed with Dr Perez

## 2023-11-18 NOTE — PROGRESS NOTE ADULT - SUBJECTIVE AND OBJECTIVE BOX
Cheif complaints and Diagnosis:  dizzy/ new onset afib/ acute small CVA    Subjective: continued dizziness and headach      REVIEW OF SYSTEMS:    CONSTITUTIONAL: No weakness, fevers or chills  EYES/ENT: No visual changes;  No vertigo or throat pain   NECK: No pain or stiffness  RESPIRATORY: No cough, wheezing, hemoptysis; No shortness of breath  CARDIOVASCULAR: No chest pain or palpitations  GASTROINTESTINAL: No abdominal or epigastric pain. No nausea, vomiting, or hematemesis; No diarrhea or constipation. No melena or hematochezia.  GENITOURINARY: No dysuria, frequency or hematuria  NEUROLOGICAL: No numbness or weakness  SKIN: No itching, burning, rashes, or lesions   All other review of systems is negative unless indicated above      Vital Signs Last 24 Hrs  T(C): 36.3 (18 Nov 2023 14:20), Max: 36.6 (18 Nov 2023 05:09)  T(F): 97.4 (18 Nov 2023 14:20), Max: 97.9 (18 Nov 2023 05:09)  HR: 88 (18 Nov 2023 14:20) (55 - 89)  BP: 111/57 (18 Nov 2023 14:20) (90/58 - 122/62)  BP(mean): 69 (18 Nov 2023 07:22) (67 - 69)  RR: 18 (18 Nov 2023 14:20) (17 - 20)  SpO2: 94% (18 Nov 2023 14:20) (94% - 100%)    Parameters below as of 18 Nov 2023 14:20  Patient On (Oxygen Delivery Method): nasal cannula  O2 Flow (L/min): 4      HEENT:   pupils equal and reactive, EOMI, no oropharyngeal lesions, erythema, exudates, oral thrush    NECK:   supple, no carotid bruits, no palpable lymph nodes, no thyromegaly    CV:  +S1, +S2, regular, no murmurs or rubs    RESP:   lungs clear to auscultation bilaterally, no wheezing, rales, rhonchi, good air entry bilaterally    BREAST:  not examined    GI:  abdomen soft, non-tender, non-distended, normal BS, no bruits, no abdominal masses, no palpable masses    RECTAL:  not examined    :  not examined    MSK:   normal muscle tone, no atrophy, no rigidity, no contractions    EXT:   no clubbing, no cyanosis, no edema, no calf pain, swelling or erythema    VASCULAR:  pulses equal and symmetric in the upper and lower extremities    NEURO:  AAOX3, no focal neurological deficits, follows all commands, able to move extremities spontaneously    SKIN:  no ulcers, lesions or rashes    MEDICATIONS  (STANDING):  aspirin enteric coated 81 milliGRAM(s) Oral daily  atorvastatin 80 milliGRAM(s) Oral at bedtime  budesonide 160 MICROgram(s)/formoterol 4.5 MICROgram(s) Inhaler 2 Puff(s) Inhalation two times a day  busPIRone 10 milliGRAM(s) Oral two times a day  clopidogrel Tablet 75 milliGRAM(s) Oral daily  cyanocobalamin 1000 MICROGram(s) Oral daily  diltiazem    milliGRAM(s) Oral daily  doxazosin 1 milliGRAM(s) Oral at bedtime  folic acid 1 milliGRAM(s) Oral daily  furosemide    Tablet 40 milliGRAM(s) Oral daily  gabapentin 400 milliGRAM(s) Oral three times a day  influenza  Vaccine (HIGH DOSE) 0.7 milliLiter(s) IntraMuscular once  metoprolol tartrate 25 milliGRAM(s) Oral two times a day  multivitamin 1 Tablet(s) Oral daily  pantoprazole    Tablet 40 milliGRAM(s) Oral before breakfast  psyllium Powder 2 Packet(s) Oral daily  QUEtiapine 100 milliGRAM(s) Oral at bedtime  spironolactone 50 milliGRAM(s) Oral daily  thiamine 100 milliGRAM(s) Oral daily  tiotropium 2.5 MICROgram(s) Inhaler 2 Puff(s) Inhalation daily    MEDICATIONS  (PRN):  acetaminophen     Tablet .. 650 milliGRAM(s) Oral every 6 hours PRN Mild Pain (1 - 3)  albuterol    90 MICROgram(s) HFA Inhaler 2 Puff(s) Inhalation every 6 hours PRN Shortness of Breath and/or Wheezing  meclizine 25 milliGRAM(s) Oral every 8 hours PRN Dizziness  ondansetron Injectable 4 milliGRAM(s) IV Push every 6 hours PRN Nausea and/or Vomiting      Urinalysis Basic - ( 18 Nov 2023 04:44 )    Color: Yellow / Appearance: Clear / SG: >1.030 / pH: x  Gluc: x / Ketone: Trace mg/dL  / Bili: Negative / Urobili: 0.2 mg/dL   Blood: x / Protein: Negative mg/dL / Nitrite: Negative   Leuk Esterase: Negative / RBC: x / WBC x   Sq Epi: x / Non Sq Epi: x / Bacteria: x    17 Nov 2023 23:42    136    |  101    |  11     ----------------------------<  136    4.2     |  29     |  0.77     Ca    8.4        17 Nov 2023 23:42    TPro  7.0    /  Alb  2.7    /  TBili  0.4    /  DBili  x      /  AST  24     /  ALT  13     /  AlkPhos  84     17 Nov 2023 23:42  LIVER FUNCTIONS - ( 17 Nov 2023 23:42 )  Alb: 2.7 g/dL / Pro: 7.0 gm/dL / ALK PHOS: 84 U/L / ALT: 13 U/L / AST: 24 U/L / GGT: x         PT/INR - ( 18 Nov 2023 01:49 )   PT: 12.2 sec;   INR: 1.08 ratio         PTT - ( 18 Nov 2023 01:49 )  PTT:31.9 secCBC Full  -  ( 17 Nov 2023 23:42 )  WBC Count : 8.37 K/uL  Hemoglobin : 10.8 g/dL  Hematocrit : 33.6 %  Platelet Count - Automated : 171 K/uL  Mean Cell Volume : 88.2 fl  Mean Cell Hemoglobin : 28.3 pg  Mean Cell Hemoglobin Concentration : 32.1 gm/dL          Blood, Urine: Negative (11-18 @ 04:44)      PT/INR - ( 18 Nov 2023 01:49 )   PT: 12.2 sec;   INR: 1.08 ratio         PTT - ( 18 Nov 2023 01:49 )  PTT:31.9 sec            Assessment and Plan:        67 y/o Male with a PMHx of anemia, Afib, CHF, COPD, cirrhosis, collapsed lung, emphysema  EtOH abuse, EtOH withdrawal, falls, GERD, HTN, meningitis, presence of Watchman left atrial appendage closure device presented with persistent dizziness/ vertigo. Patient reports he has had similar issues. Symptoms started suddenly while at home yesterday while walking, patient sat himself down, denies falls or injuries.  Reported constant dizziness and spinning sensation associated with nausea, no vomiting.  Also reports mild headache and dry mouth. No chest pain. No SOB. No recent sickness. No recent travel.     1.  Persistent Dizziness  Persistent Vertigo  -MRI- Acute CVA- Tiny acute infarct right frontal precentral gyrus.  -follow clinically with Neuro checks in telemetry unit  -Neuro consult appreciated  -f/u Echo  -Cardio consult for new onset afib  -asprin/ lipitor    2-New onset afib,   noted on telemetry around 2:30PM  -start full dose lovenox    2.  Chronic CHF  -stable  -continue home meds    3.  COPD  -stable  -continue Albuterol, Symbicort and Spiriva    4.  SCD for DVT ppx    5.  Code status: Full code          Cheif complaints and Diagnosis:  dizzy/ new onset afib/ acute small CVA    Subjective: continued dizziness and headach      REVIEW OF SYSTEMS:    CONSTITUTIONAL: No weakness, fevers or chills  EYES/ENT: No visual changes;  No vertigo or throat pain   NECK: No pain or stiffness  RESPIRATORY: No cough, wheezing, hemoptysis; No shortness of breath  CARDIOVASCULAR: No chest pain or palpitations  GASTROINTESTINAL: No abdominal or epigastric pain. No nausea, vomiting, or hematemesis; No diarrhea or constipation. No melena or hematochezia.  GENITOURINARY: No dysuria, frequency or hematuria  NEUROLOGICAL: No numbness or weakness  SKIN: No itching, burning, rashes, or lesions   All other review of systems is negative unless indicated above      Vital Signs Last 24 Hrs  T(C): 36.3 (18 Nov 2023 14:20), Max: 36.6 (18 Nov 2023 05:09)  T(F): 97.4 (18 Nov 2023 14:20), Max: 97.9 (18 Nov 2023 05:09)  HR: 88 (18 Nov 2023 14:20) (55 - 89)  BP: 111/57 (18 Nov 2023 14:20) (90/58 - 122/62)  BP(mean): 69 (18 Nov 2023 07:22) (67 - 69)  RR: 18 (18 Nov 2023 14:20) (17 - 20)  SpO2: 94% (18 Nov 2023 14:20) (94% - 100%)    Parameters below as of 18 Nov 2023 14:20  Patient On (Oxygen Delivery Method): nasal cannula  O2 Flow (L/min): 4      HEENT:   pupils equal and reactive, EOMI, no oropharyngeal lesions, erythema, exudates, oral thrush    NECK:   supple, no carotid bruits, no palpable lymph nodes, no thyromegaly    CV:  +S1, +S2, regular, no murmurs or rubs    RESP:   lungs clear to auscultation bilaterally, no wheezing, rales, rhonchi, good air entry bilaterally    BREAST:  not examined    GI:  abdomen soft, non-tender, non-distended, normal BS, no bruits, no abdominal masses, no palpable masses    RECTAL:  not examined    :  not examined    MSK:   normal muscle tone, no atrophy, no rigidity, no contractions    EXT:   no clubbing, no cyanosis, no edema, no calf pain, swelling or erythema    VASCULAR:  pulses equal and symmetric in the upper and lower extremities    NEURO:  AAOX3, no focal neurological deficits, follows all commands, able to move extremities spontaneously    SKIN:  no ulcers, lesions or rashes    MEDICATIONS  (STANDING):  aspirin enteric coated 81 milliGRAM(s) Oral daily  atorvastatin 80 milliGRAM(s) Oral at bedtime  budesonide 160 MICROgram(s)/formoterol 4.5 MICROgram(s) Inhaler 2 Puff(s) Inhalation two times a day  busPIRone 10 milliGRAM(s) Oral two times a day  clopidogrel Tablet 75 milliGRAM(s) Oral daily  cyanocobalamin 1000 MICROGram(s) Oral daily  diltiazem    milliGRAM(s) Oral daily  doxazosin 1 milliGRAM(s) Oral at bedtime  folic acid 1 milliGRAM(s) Oral daily  furosemide    Tablet 40 milliGRAM(s) Oral daily  gabapentin 400 milliGRAM(s) Oral three times a day  influenza  Vaccine (HIGH DOSE) 0.7 milliLiter(s) IntraMuscular once  metoprolol tartrate 25 milliGRAM(s) Oral two times a day  multivitamin 1 Tablet(s) Oral daily  pantoprazole    Tablet 40 milliGRAM(s) Oral before breakfast  psyllium Powder 2 Packet(s) Oral daily  QUEtiapine 100 milliGRAM(s) Oral at bedtime  spironolactone 50 milliGRAM(s) Oral daily  thiamine 100 milliGRAM(s) Oral daily  tiotropium 2.5 MICROgram(s) Inhaler 2 Puff(s) Inhalation daily    MEDICATIONS  (PRN):  acetaminophen     Tablet .. 650 milliGRAM(s) Oral every 6 hours PRN Mild Pain (1 - 3)  albuterol    90 MICROgram(s) HFA Inhaler 2 Puff(s) Inhalation every 6 hours PRN Shortness of Breath and/or Wheezing  meclizine 25 milliGRAM(s) Oral every 8 hours PRN Dizziness  ondansetron Injectable 4 milliGRAM(s) IV Push every 6 hours PRN Nausea and/or Vomiting      Urinalysis Basic - ( 18 Nov 2023 04:44 )    Color: Yellow / Appearance: Clear / SG: >1.030 / pH: x  Gluc: x / Ketone: Trace mg/dL  / Bili: Negative / Urobili: 0.2 mg/dL   Blood: x / Protein: Negative mg/dL / Nitrite: Negative   Leuk Esterase: Negative / RBC: x / WBC x   Sq Epi: x / Non Sq Epi: x / Bacteria: x    17 Nov 2023 23:42    136    |  101    |  11     ----------------------------<  136    4.2     |  29     |  0.77     Ca    8.4        17 Nov 2023 23:42    TPro  7.0    /  Alb  2.7    /  TBili  0.4    /  DBili  x      /  AST  24     /  ALT  13     /  AlkPhos  84     17 Nov 2023 23:42  LIVER FUNCTIONS - ( 17 Nov 2023 23:42 )  Alb: 2.7 g/dL / Pro: 7.0 gm/dL / ALK PHOS: 84 U/L / ALT: 13 U/L / AST: 24 U/L / GGT: x         PT/INR - ( 18 Nov 2023 01:49 )   PT: 12.2 sec;   INR: 1.08 ratio         PTT - ( 18 Nov 2023 01:49 )  PTT:31.9 secCBC Full  -  ( 17 Nov 2023 23:42 )  WBC Count : 8.37 K/uL  Hemoglobin : 10.8 g/dL  Hematocrit : 33.6 %  Platelet Count - Automated : 171 K/uL  Mean Cell Volume : 88.2 fl  Mean Cell Hemoglobin : 28.3 pg  Mean Cell Hemoglobin Concentration : 32.1 gm/dL          Blood, Urine: Negative (11-18 @ 04:44)      PT/INR - ( 18 Nov 2023 01:49 )   PT: 12.2 sec;   INR: 1.08 ratio         PTT - ( 18 Nov 2023 01:49 )  PTT:31.9 sec            Assessment and Plan:        69 y/o Male with a PMHx of anemia, Afib, CHF, COPD, cirrhosis, collapsed lung, emphysema  EtOH abuse, EtOH withdrawal, falls, GERD, HTN, meningitis, presence of Watchman left atrial appendage closure device presented with persistent dizziness/ vertigo. Patient reports he has had similar issues. Symptoms started suddenly while at home yesterday while walking, patient sat himself down, denies falls or injuries.  Reported constant dizziness and spinning sensation associated with nausea, no vomiting.  Also reports mild headache and dry mouth. No chest pain. No SOB. No recent sickness. No recent travel.     1.  Persistent Dizziness  Persistent Vertigo  -MRI- Acute CVA- Tiny acute infarct right frontal precentral gyrus.  -follow clinically with Neuro checks in telemetry unit  -Neuro consult appreciated  -f/u Echo  -Cardio consult for new onset afib  -asprin/ lipitor    2-New onset afib,   noted on telemetry around 2:30PM  -start full dose lovenox    3-right ICA  stenosis  -CTA shows 60% stenosis  -u/s shows no sig stenosis of either ICA  -vascular consult appreciated      2.  Chronic CHF  -stable  -continue home meds    3.  COPD  -stable  -continue Albuterol, Symbicort and Spiriva    4.  SCD for DVT ppx    5.  Code status: Full code

## 2023-11-18 NOTE — CONSULT NOTE ADULT - SUBJECTIVE AND OBJECTIVE BOX
Patient is a 68y old  Male who presents with a chief complaint of Persistent Vertigo (18 Nov 2023 06:20)    HPI:  67 y/o Male with a PMHx of anemia, Afib, CHF, COPD, cirrhosis, collapsed lung, emphysema  EtOH abuse, EtOH withdrawal, falls, GERD, HTN, meningitis, presence of Watchman left atrial appendage closure device presented with persistent dizziness/ vertigo. Patient reports he has had similar issues. Symptoms started suddenly while at home yesterday while walking, patient sat himself down, denies falls or injuries.  He reported constant dizziness and spinning sensation associated with nausea but no vomiting.  Also reports mild headache and dry mouth. No chest pain. No SOB. No recent sickness. No recent travel.  (18 Nov 2023 06:20)      PAST MEDICAL & SURGICAL HISTORY:  Chronic atrial fibrillation    Alcohol abuse    Poor historian    CHF (congestive heart failure)    Cirrhosis    Emphysema of lung    Alcohol withdrawal    Collapsed lung    Meningitis    Falls    Anemia    Chronic obstructive pulmonary disease (COPD)    GERD (gastroesophageal reflux disease)    Hypertension    Presence of Watchman left atrial appendage closure device    Atrial fibrillation    COPD without exacerbation    Chronic CHF    S/P BKA (below knee amputation) unilateral, left    Presence of Watchman left atrial appendage closure device    Unilateral amputation of lower extremity below knee    H/O tracheostomy  now removed    S/P percutaneous endoscopic gastrostomy (PEG) tube placement  now removed          FAMILY HISTORY:  No pertinent family history in first degree relatives        Social Hx:  Nonsmoker, no drug or alcohol use    MEDICATIONS  (STANDING):  aspirin enteric coated 81 milliGRAM(s) Oral daily  budesonide 160 MICROgram(s)/formoterol 4.5 MICROgram(s) Inhaler 2 Puff(s) Inhalation two times a day  busPIRone 10 milliGRAM(s) Oral two times a day  cyanocobalamin 1000 MICROGram(s) Oral daily  diltiazem    milliGRAM(s) Oral daily  doxazosin 1 milliGRAM(s) Oral at bedtime  folic acid 1 milliGRAM(s) Oral daily  furosemide    Tablet 40 milliGRAM(s) Oral daily  gabapentin 400 milliGRAM(s) Oral three times a day  metoprolol tartrate 25 milliGRAM(s) Oral two times a day  multivitamin 1 Tablet(s) Oral daily  pantoprazole    Tablet 40 milliGRAM(s) Oral before breakfast  psyllium Powder 2 Packet(s) Oral daily  QUEtiapine 100 milliGRAM(s) Oral at bedtime  spironolactone 50 milliGRAM(s) Oral daily  thiamine 100 milliGRAM(s) Oral daily  tiotropium 2.5 MICROgram(s) Inhaler 2 Puff(s) Inhalation daily       Allergies    Duricef (Rash)  Ceclor (Rash)    Intolerances        ROS: Pertinent positives in HPI, all other ROS were reviewed and are negative.      Vital Signs Last 24 Hrs  T(C): 36.4 (18 Nov 2023 11:01), Max: 36.6 (18 Nov 2023 05:09)  T(F): 97.6 (18 Nov 2023 11:01), Max: 97.9 (18 Nov 2023 05:09)  HR: 89 (18 Nov 2023 11:01) (55 - 89)  BP: 122/62 (18 Nov 2023 11:01) (90/58 - 122/62)  BP(mean): 69 (18 Nov 2023 07:22) (67 - 69)  RR: 18 (18 Nov 2023 11:01) (17 - 20)  SpO2: 99% (18 Nov 2023 11:01) (95% - 100%)    Parameters below as of 18 Nov 2023 11:01  Patient On (Oxygen Delivery Method): room air  O2 Flow (L/min): 2          Constitutional: awake and alert.  HEENT: PERRLA, EOMI,   Neck: Supple.  Respiratory: Breath sounds are clear bilaterally  Cardiovascular: S1 and S2, regular / irregular rhythm  Gastrointestinal: soft, nontender  Extremities:  no edema  Vascular: Caritid Bruit - no  Musculoskeletal: no joint swelling/tenderness, no abnormal movements  Skin: No rashes    Neurological exam:  HF: A x O x 3. Appropriately interactive, normal affect. Speech fluent, No Aphasia or paraphasic errors. Naming /repetition intact   CN: PRATIMA, EOMI, VFF, facial sensation normal, no NLFD, tongue midline, Palate moves equally, SCM equal bilaterally  Motor: No pronator drift, Strength 5/5 in all 4 ext, normal bulk and tone, no tremor, rigidity or bradykinesia.    Sens: Intact to light touch / PP/ VS/ JS    Reflexes: Symmetric and normal . BJ 2+, BR 2+, KJ 2+, AJ 2+, downgoing toes b/l  Coord:  No FNFA, dysmetria, ANA intact   Gait/Balance: Normal/Cannot test    NIHSS:          Labs:   11-17    136  |  101  |  11  ----------------------------<  136<H>  4.2   |  29  |  0.77    Ca    8.4<L>      17 Nov 2023 23:42    TPro  7.0  /  Alb  2.7<L>  /  TBili  0.4  /  DBili  x   /  AST  24  /  ALT  13  /  AlkPhos  84  11-17                              10.8   8.37  )-----------( 171      ( 17 Nov 2023 23:42 )             33.6       Radiology:  CT head, CTA head and neck 11/18/23:  CT HEAD:  No acute intracranial findings.    CTA HEAD:  No flow-limiting stenosis, occlusion or aneurysm.    CTA NECK:  Moderate, 60%, narrowing right ICA origin.  No other significant arterial narrowing in the neck.   Patient is a 68y old  Male who presents with a chief complaint of Persistent Vertigo (18 Nov 2023 06:20)    HPI:  67 y/o Male with a PMHx of anemia, Afib, CHF, COPD, cirrhosis, collapsed lung, emphysema  EtOH abuse, EtOH withdrawal, falls, GERD, HTN, meningitis, presence of Watchman left atrial appendage closure device presented with persistent dizziness/ vertigo. Patient reports he has had similar issues. Symptoms started suddenly while at home yesterday while walking, patient sat himself down, denies falls or injuries.  He reported constant dizziness and spinning sensation associated with nausea. He did vomit.  Also reports mild headache and dry mouth. No chest pain. No SOB. No recent sickness. No recent travel.  (18 Nov 2023 06:20)  He does not recall feeling weaker on one side more than another.  He denies any recent viral illness.  He denies recent travel.  He feels a bit better today and was able to hold down his breakfast.    PAST MEDICAL & SURGICAL HISTORY:  Chronic atrial fibrillation    Alcohol abuse    Poor historian    CHF (congestive heart failure)    Cirrhosis    Emphysema of lung    Alcohol withdrawal    Collapsed lung    Meningitis    Falls    Anemia    Chronic obstructive pulmonary disease (COPD)    GERD (gastroesophageal reflux disease)    Hypertension    Presence of Watchman left atrial appendage closure device    Atrial fibrillation    COPD without exacerbation    Chronic CHF    S/P BKA (below knee amputation) unilateral, left    Presence of Watchman left atrial appendage closure device    Unilateral amputation of lower extremity below knee    H/O tracheostomy  now removed    S/P percutaneous endoscopic gastrostomy (PEG) tube placement  now removed          FAMILY HISTORY:  No pertinent family history in first degree relatives      Social Hx: former smoker, + ETOH use, no illicit drug use    MEDICATIONS  (STANDING):  aspirin enteric coated 81 milliGRAM(s) Oral daily  budesonide 160 MICROgram(s)/formoterol 4.5 MICROgram(s) Inhaler 2 Puff(s) Inhalation two times a day  busPIRone 10 milliGRAM(s) Oral two times a day  cyanocobalamin 1000 MICROGram(s) Oral daily  diltiazem    milliGRAM(s) Oral daily  doxazosin 1 milliGRAM(s) Oral at bedtime  folic acid 1 milliGRAM(s) Oral daily  furosemide    Tablet 40 milliGRAM(s) Oral daily  gabapentin 400 milliGRAM(s) Oral three times a day  metoprolol tartrate 25 milliGRAM(s) Oral two times a day  multivitamin 1 Tablet(s) Oral daily  pantoprazole    Tablet 40 milliGRAM(s) Oral before breakfast  psyllium Powder 2 Packet(s) Oral daily  QUEtiapine 100 milliGRAM(s) Oral at bedtime  spironolactone 50 milliGRAM(s) Oral daily  thiamine 100 milliGRAM(s) Oral daily  tiotropium 2.5 MICROgram(s) Inhaler 2 Puff(s) Inhalation daily       Allergies    Duricef (Rash)  Ceclor (Rash)    Intolerances        ROS: Pertinent positives in HPI, all other ROS were reviewed and are negative.      Vital Signs Last 24 Hrs  T(C): 36.4 (18 Nov 2023 11:01), Max: 36.6 (18 Nov 2023 05:09)  T(F): 97.6 (18 Nov 2023 11:01), Max: 97.9 (18 Nov 2023 05:09)  HR: 89 (18 Nov 2023 11:01) (55 - 89)  BP: 122/62 (18 Nov 2023 11:01) (90/58 - 122/62)  BP(mean): 69 (18 Nov 2023 07:22) (67 - 69)  RR: 18 (18 Nov 2023 11:01) (17 - 20)  SpO2: 99% (18 Nov 2023 11:01) (95% - 100%)    Parameters below as of 18 Nov 2023 11:01  Patient On (Oxygen Delivery Method): room air  O2 Flow (L/min): 2          Constitutional: awake and alert.  HEENT: PERRLA, EOMI,   Neck: Supple.  Respiratory: Breath sounds are clear bilaterally  Cardiovascular: S1 and S2, regular / irregular rhythm  Gastrointestinal: soft, nontender  Extremities:  no edema  Vascular: Caritid Bruit - no  Musculoskeletal: no joint swelling/tenderness, no abnormal movements  Skin: No rashes    Neurological exam:  HF: A x O x 3. Appropriately interactive, normal affect. Speech fluent, No Aphasia or paraphasic errors. Naming /repetition intact   CN: PRATIMA, EOMI, VFF, facial sensation normal, no NLFD, tongue midline, Palate moves equally, SCM equal bilaterally  Motor: No pronator drift, Strength 5/5 in all 4 ext, normal bulk and tone, no tremor, rigidity or bradykinesia.    Sens: Intact to light touch / PP/ VS/ JS    Reflexes: Symmetric and normal . BJ 2+, BR 2+, KJ 2+, AJ 2+, downgoing toes b/l  Coord:  No FNFA, dysmetria, ANA intact   Gait/Balance: Normal/Cannot test    NIHSS:          Labs:   11-17    136  |  101  |  11  ----------------------------<  136<H>  4.2   |  29  |  0.77    Ca    8.4<L>      17 Nov 2023 23:42    TPro  7.0  /  Alb  2.7<L>  /  TBili  0.4  /  DBili  x   /  AST  24  /  ALT  13  /  AlkPhos  84  11-17                              10.8   8.37  )-----------( 171      ( 17 Nov 2023 23:42 )             33.6       Radiology:  CT head, CTA head and neck 11/18/23:  CT HEAD:  No acute intracranial findings.    CTA HEAD:  No flow-limiting stenosis, occlusion or aneurysm.    CTA NECK:  Moderate, 60%, narrowing right ICA origin.  No other significant arterial narrowing in the neck.   Patient is a 68y old  Male who presents with a chief complaint of Persistent Vertigo (18 Nov 2023 06:20)    HPI:  67 y/o Male with a PMHx of anemia, Afib, CHF, COPD, cirrhosis, collapsed lung, emphysema  EtOH abuse, EtOH withdrawal, falls, GERD, HTN, meningitis, presence of Watchman left atrial appendage closure device presented with persistent dizziness/ vertigo. Patient reports he has had similar issues. Symptoms started suddenly while at home yesterday while walking, patient sat himself down, denies falls or injuries.  He reported constant dizziness and spinning sensation associated with nausea. He did vomit.  Also reports mild headache and dry mouth. No chest pain. No SOB. No recent sickness. No recent travel.  (18 Nov 2023 06:20)  He does not recall feeling weaker on one side more than another.  He denies any recent viral illness.  He denies recent travel.  He feels a bit better today and was able to hold down his breakfast.    PAST MEDICAL & SURGICAL HISTORY:  Chronic atrial fibrillation    Alcohol abuse    Poor historian    CHF (congestive heart failure)    Cirrhosis    Emphysema of lung    Alcohol withdrawal    Collapsed lung    Meningitis    Falls    Anemia    Chronic obstructive pulmonary disease (COPD)    GERD (gastroesophageal reflux disease)    Hypertension    Presence of Watchman left atrial appendage closure device    Atrial fibrillation    COPD without exacerbation    Chronic CHF    S/P BKA (below knee amputation) unilateral, left    Presence of Watchman left atrial appendage closure device    Unilateral amputation of lower extremity below knee    H/O tracheostomy  now removed    S/P percutaneous endoscopic gastrostomy (PEG) tube placement  now removed          FAMILY HISTORY:  No pertinent family history in first degree relatives      Social Hx: former smoker, + ETOH use, no illicit drug use    MEDICATIONS  (STANDING):  aspirin enteric coated 81 milliGRAM(s) Oral daily  budesonide 160 MICROgram(s)/formoterol 4.5 MICROgram(s) Inhaler 2 Puff(s) Inhalation two times a day  busPIRone 10 milliGRAM(s) Oral two times a day  cyanocobalamin 1000 MICROGram(s) Oral daily  diltiazem    milliGRAM(s) Oral daily  doxazosin 1 milliGRAM(s) Oral at bedtime  folic acid 1 milliGRAM(s) Oral daily  furosemide    Tablet 40 milliGRAM(s) Oral daily  gabapentin 400 milliGRAM(s) Oral three times a day  metoprolol tartrate 25 milliGRAM(s) Oral two times a day  multivitamin 1 Tablet(s) Oral daily  pantoprazole    Tablet 40 milliGRAM(s) Oral before breakfast  psyllium Powder 2 Packet(s) Oral daily  QUEtiapine 100 milliGRAM(s) Oral at bedtime  spironolactone 50 milliGRAM(s) Oral daily  thiamine 100 milliGRAM(s) Oral daily  tiotropium 2.5 MICROgram(s) Inhaler 2 Puff(s) Inhalation daily       Allergies    Duricef (Rash)  Ceclor (Rash)    Intolerances        ROS: Pertinent positives in HPI, all other ROS were reviewed and are negative.      Vital Signs Last 24 Hrs  T(C): 36.4 (18 Nov 2023 11:01), Max: 36.6 (18 Nov 2023 05:09)  T(F): 97.6 (18 Nov 2023 11:01), Max: 97.9 (18 Nov 2023 05:09)  HR: 89 (18 Nov 2023 11:01) (55 - 89)  BP: 122/62 (18 Nov 2023 11:01) (90/58 - 122/62)  BP(mean): 69 (18 Nov 2023 07:22) (67 - 69)  RR: 18 (18 Nov 2023 11:01) (17 - 20)  SpO2: 99% (18 Nov 2023 11:01) (95% - 100%)    Parameters below as of 18 Nov 2023 11:01  Patient On (Oxygen Delivery Method): room air  O2 Flow (L/min): 2          Constitutional: awake and alert.  HEENT: PERRLA, EOMI,   Neck: Supple.  Respiratory: Breath sounds are clear bilaterally  Cardiovascular: S1 and S2, regular / irregular rhythm  Gastrointestinal: soft, nontender  Extremities:  no edema  Vascular: Caritid Bruit - no  Musculoskeletal: no joint swelling/tenderness, no abnormal movements  Skin: No rashes    Neurological exam:  HF: A x O x 3. Appropriately interactive, normal affect. Speech fluent, No Aphasia or paraphasic errors. Naming /repetition intact   CN: PRATIMA, EOMI, VFF, facial sensation normal, no NLFD, tongue midline, Palate moves equally, SCM equal bilaterally  Motor: No pronator drift, Strength 5/5 in upper extremities and right lower extremity, left BKA  Sens: Intact to light touch   Reflexes: Symmetric and normal . BJ 1+, BR 1+, KJ 1+, downgoing right toe  Coord: finger to nose intact b/l  Gait/Balance: Not tested    NIHSS: 0          Labs:   11-17    136  |  101  |  11  ----------------------------<  136<H>  4.2   |  29  |  0.77    Ca    8.4<L>      17 Nov 2023 23:42    TPro  7.0  /  Alb  2.7<L>  /  TBili  0.4  /  DBili  x   /  AST  24  /  ALT  13  /  AlkPhos  84  11-17                              10.8   8.37  )-----------( 171      ( 17 Nov 2023 23:42 )             33.6       Radiology:  CT head, CTA head and neck 11/18/23:  CT HEAD:  No acute intracranial findings.    CTA HEAD:  No flow-limiting stenosis, occlusion or aneurysm.    CTA NECK:  Moderate, 60%, narrowing right ICA origin.  No other significant arterial narrowing in the neck.    MRI head 11/18/23:  Age-appropriate involutional and ischemic gliotic changes.   Tiny acute infarct right frontal precentral gyrus.    Carotid ultrasound 11/18/23:  No significant hemodynamic stenosis of either carotid artery.

## 2023-11-18 NOTE — CONSULT NOTE ADULT - ASSESSMENT
MR. Boone is a 67 y/o Male with a PMHx of anemia, Afib, CHF, COPD, cirrhosis, collapsed lung, emphysema  EtOH abuse, EtOH withdrawal, falls, GERD, HTN, meningitis, presence of Watchman left atrial appendage closure device presented with persistent dizziness/ vertigo.    His neurological examination is non-focal.    Dizziness:  Differential diagnosis includes benign peripheral vertigo vs stroke.  -MRI brain does show a tiny acute infarct in the right frontal precentral gyrus.  -I do not think this is necessarily the cause of his dizziness.  -Can give meclizine prn for dizziness    Stroke:  - tiny acute infarct in the right frontal precentral gyrus noted on MRI  - He takes aspirin 81 mg/day at home. Will add Plavix 75 mg. Continue DAPT x 21 days and then change to Plavix monotherapy  -Continue statin  - 60% stenosis of right ICA seen on CTA, but carotid ultrasound shows no significant stenosis.    Will f/u as needed.

## 2023-11-18 NOTE — CONSULT NOTE ADULT - SUBJECTIVE AND OBJECTIVE BOX
HPI:    67 y/o Male with a PMHx of anemia, Afib, CHF, COPD, cirrhosis, collapsed lung, emphysema  EtOH abuse, EtOH withdrawal, falls, GERD, HTN, meningitis, presence of Watchman left atrial appendage closure device presented with persistent dizziness/ vertigo. Patient reports he has had similar symptoms of dizziness over the past 30 years, intermittently. Symptoms started suddenly while at home yesterday while walking, patient sat himself down, denies falls or injuries.  Reported constant dizziness aassociated with nausea, no vomiting.  Also reports mild headache and dry mouth. No chest pain. No SOB. No recent sickness. No recent travel.   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       PAST MEDICAL & SURGICAL HISTORY:  Chronic atrial fibrillation      Alcohol abuse      Poor historian      CHF (congestive heart failure)      Cirrhosis      Emphysema of lung      Alcohol withdrawal      Collapsed lung      Meningitis      Falls      Anemia      Chronic obstructive pulmonary disease (COPD)      GERD (gastroesophageal reflux disease)      Hypertension      Presence of Watchman left atrial appendage closure device      Atrial fibrillation      COPD without exacerbation      Chronic CHF      S/P BKA (below knee amputation) unilateral, left      Presence of Watchman left atrial appendage closure device      Unilateral amputation of lower extremity below knee      H/O tracheostomy  now removed      S/P percutaneous endoscopic gastrostomy (PEG) tube placement  now removed          MEDICATIONS  (STANDING):  aspirin enteric coated 81 milliGRAM(s) Oral daily  atorvastatin 80 milliGRAM(s) Oral at bedtime  budesonide 160 MICROgram(s)/formoterol 4.5 MICROgram(s) Inhaler 2 Puff(s) Inhalation two times a day  busPIRone 10 milliGRAM(s) Oral two times a day  clopidogrel Tablet 75 milliGRAM(s) Oral daily  cyanocobalamin 1000 MICROGram(s) Oral daily  diltiazem    milliGRAM(s) Oral daily  doxazosin 1 milliGRAM(s) Oral at bedtime  enoxaparin Injectable 90 milliGRAM(s) SubCutaneous every 12 hours  folic acid 1 milliGRAM(s) Oral daily  furosemide    Tablet 40 milliGRAM(s) Oral daily  gabapentin 400 milliGRAM(s) Oral three times a day  influenza  Vaccine (HIGH DOSE) 0.7 milliLiter(s) IntraMuscular once  metoprolol tartrate 25 milliGRAM(s) Oral two times a day  multivitamin 1 Tablet(s) Oral daily  pantoprazole    Tablet 40 milliGRAM(s) Oral before breakfast  psyllium Powder 2 Packet(s) Oral daily  QUEtiapine 100 milliGRAM(s) Oral at bedtime  spironolactone 50 milliGRAM(s) Oral daily  thiamine 100 milliGRAM(s) Oral daily  tiotropium 2.5 MICROgram(s) Inhaler 2 Puff(s) Inhalation daily    MEDICATIONS  (PRN):  acetaminophen     Tablet .. 650 milliGRAM(s) Oral every 6 hours PRN Mild Pain (1 - 3)  albuterol    90 MICROgram(s) HFA Inhaler 2 Puff(s) Inhalation every 6 hours PRN Shortness of Breath and/or Wheezing  meclizine 25 milliGRAM(s) Oral every 8 hours PRN Dizziness  ondansetron Injectable 4 milliGRAM(s) IV Push every 6 hours PRN Nausea and/or Vomiting      Allergies    Duricef (Rash)  Ceclor (Rash)    Intolerances        SOCIAL HISTORY:    FAMILY HISTORY:  No pertinent family history in first degree relatives          PHYSICAL EXAM:   Constitutional: awake and alert.  HEENT: PERRLA, EOMI,   Neck: Supple.  Respiratory: Breath sounds are clear bilaterally  Cardiovascular: S1 and S2, regular / irregular rhythm  Gastrointestinal: soft, nontender  Extremities:  no edema  Vascular: Caritid Bruit - no  Musculoskeletal: no joint swelling/tenderness, no abnormal movements, L BKA  Skin: No rashes  Neuro: No pronator drift, Strength 5/5 in upper extremities and right lower extremity,  Intact to light touch, Reflexes: Symmetric and normal . BJ 1+, BR 1+, KJ 1+, downgoing right toe        Vital Signs Last 24 Hrs  T(C): 36.3 (18 Nov 2023 14:20), Max: 36.6 (18 Nov 2023 05:09)  T(F): 97.4 (18 Nov 2023 14:20), Max: 97.9 (18 Nov 2023 05:09)  HR: 88 (18 Nov 2023 14:20) (55 - 89)  BP: 111/57 (18 Nov 2023 14:20) (90/58 - 122/62)  BP(mean): 69 (18 Nov 2023 07:22) (67 - 69)  RR: 18 (18 Nov 2023 14:20) (17 - 20)  SpO2: 94% (18 Nov 2023 14:20) (94% - 100%)    Parameters below as of 18 Nov 2023 14:20  Patient On (Oxygen Delivery Method): nasal cannula  O2 Flow (L/min): 4      I&O's Summary          LABS:                        10.8   8.37  )-----------( 171      ( 17 Nov 2023 23:42 )             33.6     11-17    136  |  101  |  11  ----------------------------<  136<H>  4.2   |  29  |  0.77    Ca    8.4<L>      17 Nov 2023 23:42    TPro  7.0  /  Alb  2.7<L>  /  TBili  0.4  /  DBili  x   /  AST  24  /  ALT  13  /  AlkPhos  84  11-17    PT/INR - ( 18 Nov 2023 01:49 )   PT: 12.2 sec;   INR: 1.08 ratio         PTT - ( 18 Nov 2023 01:49 )  PTT:31.9 sec  Urinalysis Basic - ( 18 Nov 2023 04:44 )    Color: Yellow / Appearance: Clear / SG: >1.030 / pH: x  Gluc: x / Ketone: Trace mg/dL  / Bili: Negative / Urobili: 0.2 mg/dL   Blood: x / Protein: Negative mg/dL / Nitrite: Negative   Leuk Esterase: Negative / RBC: x / WBC x   Sq Epi: x / Non Sq Epi: x / Bacteria: x        LIVER FUNCTIONS - ( 17 Nov 2023 23:42 )  Alb: 2.7 g/dL / Pro: 7.0 gm/dL / ALK PHOS: 84 U/L / ALT: 13 U/L / AST: 24 U/L / GGT: x                 RADIOLOGY & ADDITIONAL STUDIES:    MR Head No Cont (11.18.23 @ 12:45)   IMPRESSION: Age-appropriate involutional and ischemic gliotic changes.   Tiny acute infarct right frontal precentral gyrus.    US Duplex Carotid Arteries Complete, Bilateral (11.18.23 @ 12:32)   IMPRESSION: No significant hemodynamic stenosis of either carotid artery.    Measurement of carotid stenosis is based on velocity parameters that   correlate the residual internal carotid diameter with that of the more   distal vessel in accordance with a method such as the North American   Symptomatic Carotid Endarterectomy Trial (NASCET).    CT Angio Neck w/ IV Cont (11.18.23 @ 01:06)   IMPRESSION:    CT HEAD:  No acute intracranial findings.    CTA HEAD:  No flow-limiting stenosis, occlusion or aneurysm.    CTA NECK:  Moderate, 60%, narrowing right ICA origin.  No other significant arterial narrowing in the neck.           HPI:    69 y/o Male with a PMHx of anemia, Afib, CHF, COPD, cirrhosis, collapsed lung, emphysema  EtOH abuse, EtOH withdrawal, falls, GERD, HTN, meningitis, presence of Watchman left atrial appendage closure device presented with persistent dizziness/ vertigo. Patient reports he has had similar symptoms of dizziness over the past 30 years, intermittently. Symptoms started suddenly while at home yesterday while walking, patient sat himself down, denies falls or injuries.  Reported constant dizziness aassociated with nausea, no vomiting.  Also reports mild headache and dry mouth. No chest pain. No SOB. No recent sickness. No recent travel.   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       PAST MEDICAL & SURGICAL HISTORY:  Chronic atrial fibrillation      Alcohol abuse      Poor historian      CHF (congestive heart failure)      Cirrhosis      Emphysema of lung      Alcohol withdrawal      Collapsed lung      Meningitis      Falls      Anemia      Chronic obstructive pulmonary disease (COPD)      GERD (gastroesophageal reflux disease)      Hypertension      Presence of Watchman left atrial appendage closure device      Atrial fibrillation      COPD without exacerbation      Chronic CHF      S/P BKA (below knee amputation) unilateral, left      Presence of Watchman left atrial appendage closure device      Unilateral amputation of lower extremity below knee      H/O tracheostomy  now removed      S/P percutaneous endoscopic gastrostomy (PEG) tube placement  now removed          MEDICATIONS  (STANDING):  aspirin enteric coated 81 milliGRAM(s) Oral daily  atorvastatin 80 milliGRAM(s) Oral at bedtime  budesonide 160 MICROgram(s)/formoterol 4.5 MICROgram(s) Inhaler 2 Puff(s) Inhalation two times a day  busPIRone 10 milliGRAM(s) Oral two times a day  clopidogrel Tablet 75 milliGRAM(s) Oral daily  cyanocobalamin 1000 MICROGram(s) Oral daily  diltiazem    milliGRAM(s) Oral daily  doxazosin 1 milliGRAM(s) Oral at bedtime  enoxaparin Injectable 90 milliGRAM(s) SubCutaneous every 12 hours  folic acid 1 milliGRAM(s) Oral daily  furosemide    Tablet 40 milliGRAM(s) Oral daily  gabapentin 400 milliGRAM(s) Oral three times a day  influenza  Vaccine (HIGH DOSE) 0.7 milliLiter(s) IntraMuscular once  metoprolol tartrate 25 milliGRAM(s) Oral two times a day  multivitamin 1 Tablet(s) Oral daily  pantoprazole    Tablet 40 milliGRAM(s) Oral before breakfast  psyllium Powder 2 Packet(s) Oral daily  QUEtiapine 100 milliGRAM(s) Oral at bedtime  spironolactone 50 milliGRAM(s) Oral daily  thiamine 100 milliGRAM(s) Oral daily  tiotropium 2.5 MICROgram(s) Inhaler 2 Puff(s) Inhalation daily    MEDICATIONS  (PRN):  acetaminophen     Tablet .. 650 milliGRAM(s) Oral every 6 hours PRN Mild Pain (1 - 3)  albuterol    90 MICROgram(s) HFA Inhaler 2 Puff(s) Inhalation every 6 hours PRN Shortness of Breath and/or Wheezing  meclizine 25 milliGRAM(s) Oral every 8 hours PRN Dizziness  ondansetron Injectable 4 milliGRAM(s) IV Push every 6 hours PRN Nausea and/or Vomiting      Allergies    Duricef (Rash)  Ceclor (Rash)    Intolerances        SOCIAL HISTORY:    FAMILY HISTORY:  No pertinent family history in first degree relatives          PHYSICAL EXAM:   Constitutional: awake and alert.  HEENT: PERRLA, EOMI,   Neck: Supple.  Respiratory: Breath sounds are clear bilaterally  Cardiovascular: S1 and S2, regular / irregular rhythm  Gastrointestinal: soft, nontender  Extremities:  no edema  Vascular: Caritid Bruit - no  Musculoskeletal: no joint swelling/tenderness, no abnormal movements, L BKA  Skin: No rashes  Neuro: No pronator drift, Strength 5/5 in upper extremities and right lower extremity,  Intact to light touch, Reflexes: Symmetric and normal . BJ 1+, BR 1+, KJ 1+, downgoing right toe        Vital Signs Last 24 Hrs  T(C): 36.3 (18 Nov 2023 14:20), Max: 36.6 (18 Nov 2023 05:09)  T(F): 97.4 (18 Nov 2023 14:20), Max: 97.9 (18 Nov 2023 05:09)  HR: 88 (18 Nov 2023 14:20) (55 - 89)  BP: 111/57 (18 Nov 2023 14:20) (90/58 - 122/62)  BP(mean): 69 (18 Nov 2023 07:22) (67 - 69)  RR: 18 (18 Nov 2023 14:20) (17 - 20)  SpO2: 94% (18 Nov 2023 14:20) (94% - 100%)    Parameters below as of 18 Nov 2023 14:20  Patient On (Oxygen Delivery Method): nasal cannula  O2 Flow (L/min): 4      I&O's Summary          LABS:                        10.8   8.37  )-----------( 171      ( 17 Nov 2023 23:42 )             33.6     11-17    136  |  101  |  11  ----------------------------<  136<H>  4.2   |  29  |  0.77    Ca    8.4<L>      17 Nov 2023 23:42    TPro  7.0  /  Alb  2.7<L>  /  TBili  0.4  /  DBili  x   /  AST  24  /  ALT  13  /  AlkPhos  84  11-17    PT/INR - ( 18 Nov 2023 01:49 )   PT: 12.2 sec;   INR: 1.08 ratio         PTT - ( 18 Nov 2023 01:49 )  PTT:31.9 sec  Urinalysis Basic - ( 18 Nov 2023 04:44 )    Color: Yellow / Appearance: Clear / SG: >1.030 / pH: x  Gluc: x / Ketone: Trace mg/dL  / Bili: Negative / Urobili: 0.2 mg/dL   Blood: x / Protein: Negative mg/dL / Nitrite: Negative   Leuk Esterase: Negative / RBC: x / WBC x   Sq Epi: x / Non Sq Epi: x / Bacteria: x        LIVER FUNCTIONS - ( 17 Nov 2023 23:42 )  Alb: 2.7 g/dL / Pro: 7.0 gm/dL / ALK PHOS: 84 U/L / ALT: 13 U/L / AST: 24 U/L / GGT: x                 RADIOLOGY & ADDITIONAL STUDIES:    MR Head No Cont (11.18.23 @ 12:45)   IMPRESSION: Age-appropriate involutional and ischemic gliotic changes.   Tiny acute infarct right frontal precentral gyrus.    US Duplex Carotid Arteries Complete, Bilateral (11.18.23 @ 12:32)   IMPRESSION: No significant hemodynamic stenosis of either carotid artery.    Measurement of carotid stenosis is based on velocity parameters that   correlate the residual internal carotid diameter with that of the more   distal vessel in accordance with a method such as the North American   Symptomatic Carotid Endarterectomy Trial (NASCET).    CT Angio Neck w/ IV Cont (11.18.23 @ 01:06)   IMPRESSION:    CT HEAD:  No acute intracranial findings.    CTA HEAD:  No flow-limiting stenosis, occlusion or aneurysm.    CTA NECK:  Moderate, 60%, narrowing right ICA origin.  No other significant arterial narrowing in the neck.

## 2023-11-18 NOTE — ED ADULT NURSE REASSESSMENT NOTE - NS ED NURSE REASSESS COMMENT FT1
Assumed care of pt from overnight RN. VS as charted. Pt sleeping at this time. Pending bed placement on telemetry floor.

## 2023-11-18 NOTE — H&P ADULT - NSHPPHYSICALEXAM_GEN_ALL_CORE
T(C): 36.6 (11-18-23 @ 05:09), Max: 36.6 (11-18-23 @ 05:09)  HR: 79 (11-18-23 @ 05:09) (55 - 79)  BP: 90/58 (11-18-23 @ 05:09) (90/58 - 115/56)  RR: 17 (11-18-23 @ 05:09) (17 - 18)  SpO2: 100% (11-18-23 @ 05:09) (95% - 100%)    CONSTITUTIONAL: Well groomed, no apparent distress  EYES: PERRLA and symmetric, EOMI, No conjunctival or scleral injection, non-icteric  ENMT: Oral mucosa with moist membranes. Normal dentition; no pharyngeal injection or exudates             NECK: Supple, symmetric and without tracheal deviation   RESP: No respiratory distress, no use of accessory muscles; CTA b/l, no WRR  CV: RRR, +S1S2, no MRG; no JVD; no peripheral edema  GI: Soft, NT, ND, no rebound, no guarding; no palpable masses;   LYMPH: No cervical LAD or tenderness;  MSK: Left BKA, Normal ROM without pain, normal muscle strength/tone  SKIN: No rashes or ulcers noted;   NEURO: CN II-XII intact; normal reflexes in upper and lower extremities, sensation intact in upper and lower extremities b/l to light touch   PSYCH: Appropriate insight/judgment; A+O x 3, mood and affect appropriate, recent/remote memory intact

## 2023-11-18 NOTE — PATIENT PROFILE ADULT - FALL HARM RISK - HARM RISK INTERVENTIONS
Assistance with ambulation/Assistance OOB with selected safe patient handling equipment/Communicate Risk of Fall with Harm to all staff/Discuss with provider need for PT consult/Monitor gait and stability/Provide patient with walking aids - walker, cane, crutches/Reinforce activity limits and safety measures with patient and family/Sit up slowly, dangle for a short time, stand at bedside before walking/Tailored Fall Risk Interventions/Visual Cue: Yellow wristband and red socks/Bed in lowest position, wheels locked, appropriate side rails in place/Call bell, personal items and telephone in reach/Instruct patient to call for assistance before getting out of bed or chair/Non-slip footwear when patient is out of bed/[place] to call system/Physically safe environment - no spills, clutter or unnecessary equipment/Purposeful Proactive Rounding/Room/bathroom lighting operational, light cord in reach

## 2023-11-18 NOTE — H&P ADULT - ASSESSMENT
A/P:    1.  Persistent Dizziness  Persistent Vertigo  r/o Acute CVA  -follow clinically with Neuro checks in telemetry unit  -give meclizine as needed  -fall precautions  -follow neurology consult to determine the need for MRI brain    2.  Chronic CHF  -stable  -continue home meds    3.  COPD  -stable  -continue Albuterol, Symbicort and Spiriva    4.  SCD for DVT ppx    5.  Code status: Full code

## 2023-11-18 NOTE — ED ADULT NURSE REASSESSMENT NOTE - NS ED NURSE REASSESS COMMENT FT1
Pt c/o lower back pain, medicated with PRN tylenol. No acute distress or other complaints at this time. Safety measures in place, stretcher locked and in lowest position, both side rails ups.

## 2023-11-18 NOTE — PATIENT PROFILE ADULT - CHOOSE INDICATION TO IMMUNIZE (AN ORDER WILL BE GENERATED WHEN THIS NOTE IS SAVED):
Daily Note     Today's date: 10/12/2023  Patient name: Merna Alcazar  : 2004  MRN: [id number]  Referring provider: Jenny Noble*  Dx:   Encounter Diagnosis     ICD-10-CM    1. Dizziness on standing  R42                        Subjective: reports that she went to the eye doctor yesterday and everything was good. She states that her migraines have been worse in the last week, despite starting medication. She states that exercise is the main thing that makes her symptoms worse. She states that she is getting B12 shots and her hemoglobin and vitamin D levels were low. States she stood for 2 hours straight with mild symptoms at work the other day. Objective: See treatment diary below  Self-directed exercise from [id number]  1:1 with PT from [id number]    Assessment: Kiersten tolerated treatment session well. Reported some chest pain during warm up, increased during maximal steady state. Lightheadedness and migraine also increased with maximal steady state exercises, lightheadedness to 3-4/10. Increased HR ambulating without UE support with max HR to 154. Was able to tolerate increased HR during session, with today's session being the highest compared to previous sessions. Salt packet after completing exercise and sitting for a few minutes before walking around the clinic with some increased headache. Overall appears to be tolerating increased activity with requiring decreased seated breaks at end of session. Continue to progress as tolerated. Plan: Continue per plan of care.        Plan of Care beginning date: 2023  Plan of Care expiration date: 2023    Precautions: none    Insurance: Payor: [address] [place] [address]  Number of visits authorized: 24 visits  Visit count: 15  POC Expiration: 2023  Auth Date Ranges: 2023-2023    Manuals 9/7 9/14 9/21 9/27 10/5 10/12                                       Neuro Re-Ed Ther Ex         Nhan Protocol Upright bike    Warm up 10 minutes (RPE 2/10)     35 min FIGUEROA (RPE 6/10) -135    10 min cool down (RPE 2/10) -120 Treadmill    Warm up at 1.0mph  HR , RPE 2/10    25 min base pace (RPE 4/10) 1.7mph, -134    10 min cool down, TM at 1.0mph   RPE 2/10, -120 Treadmill    Warm up at 1.0mph HR   RPE 2/10  9 minutes 11 seconds before sitting down due to graying out    Upright Bike  35 minutes base pace RPE  -116    10 minute cool down upright bike, RPE 2/10 -120   Treadmill    Warm up 10 minutes (RPE 2/10) HR     35 min base (RPE 3/10) -112    10 min cool down (RPE 2/10) -105 Treadmill    Warm up 10 minutes (RPE 2/10) -126    FIGUEROA (RPE 6-8/10) for 30 minutes, 2.1-2.4mph -135    10 min cool down (RPE 2/10) -130 Treadmill    Warm up 10 minutes (RPE 2/10) at 1.0mph, HR up to 120    FIGUEROA (RPE 6-8/10) for 35 minutes 2.2-2. 3mph -153    10 min cool down (RPE 2/10) -135                                  Strengthening exercises                                    Ther Activity                           Gait Training                           Modalities         Education                   Access Code: [email or web address]  URL: [email or web address]  Date: 07/31/2023  Prepared by: Celio Zhou    Exercises  - Bird Dog  - 1 x daily - 3 x weekly - 1 sets - 10 reps  - Standard Plank  - 1 x daily - 4 x weekly - 3 sets - 30 hold  - Side Plank on Elbow  - 1 x daily - 3 x weekly - 3 sets - 20 hold  - Supine March  - 1 x daily - 3 x weekly - 2 sets - 10 reps  - Supine 90/90 Alternating Toe Touch  - 1 x daily - 3 x weekly - 2 sets - 10 reps Patient is not pregnant (male or female)

## 2023-11-18 NOTE — H&P ADULT - HISTORY OF PRESENT ILLNESS
67 y/o Male with a PMHx of anemia, Afib, CHF, COPD, cirrhosis, collapsed lung, emphysema  EtOH abuse, EtOH withdrawal, falls, GERD, HTN, meningitis, presence of Watchman left atrial appendage closure device presented with persistent dizziness/ vertigo. Patient reports he has had similar issues. Symptoms started suddenly while at home yesterday while walking, patient sat himself down, denies falls or injuries.  Reported constant dizziness and spinning sensation associated with nausea, no vomiting.  Also reports mild headache and dry mouth. No chest pain. No SOB. No recent sickness. No recent travel.

## 2023-11-19 ENCOUNTER — TRANSCRIPTION ENCOUNTER (OUTPATIENT)
Age: 68
End: 2023-11-19

## 2023-11-19 DIAGNOSIS — I63.9 CEREBRAL INFARCTION, UNSPECIFIED: ICD-10-CM

## 2023-11-19 DIAGNOSIS — I50.31 ACUTE DIASTOLIC (CONGESTIVE) HEART FAILURE: ICD-10-CM

## 2023-11-19 DIAGNOSIS — I48.21 PERMANENT ATRIAL FIBRILLATION: ICD-10-CM

## 2023-11-19 PROCEDURE — 99233 SBSQ HOSP IP/OBS HIGH 50: CPT

## 2023-11-19 PROCEDURE — 99232 SBSQ HOSP IP/OBS MODERATE 35: CPT

## 2023-11-19 PROCEDURE — 71045 X-RAY EXAM CHEST 1 VIEW: CPT | Mod: 26

## 2023-11-19 RX ORDER — FUROSEMIDE 40 MG
40 TABLET ORAL
Refills: 0 | Status: DISCONTINUED | OUTPATIENT
Start: 2023-11-19 | End: 2023-11-22

## 2023-11-19 RX ORDER — ZALEPLON 10 MG
5 CAPSULE ORAL AT BEDTIME
Refills: 0 | Status: DISCONTINUED | OUTPATIENT
Start: 2023-11-19 | End: 2023-11-26

## 2023-11-19 RX ADMIN — Medication 100 MILLIGRAM(S): at 10:39

## 2023-11-19 RX ADMIN — Medication 2 PACKET(S): at 10:43

## 2023-11-19 RX ADMIN — Medication 650 MILLIGRAM(S): at 16:58

## 2023-11-19 RX ADMIN — Medication 5 MILLIGRAM(S): at 21:25

## 2023-11-19 RX ADMIN — Medication 10 MILLIGRAM(S): at 21:25

## 2023-11-19 RX ADMIN — Medication 650 MILLIGRAM(S): at 23:24

## 2023-11-19 RX ADMIN — Medication 1 TABLET(S): at 10:42

## 2023-11-19 RX ADMIN — GABAPENTIN 400 MILLIGRAM(S): 400 CAPSULE ORAL at 15:40

## 2023-11-19 RX ADMIN — PANTOPRAZOLE SODIUM 40 MILLIGRAM(S): 20 TABLET, DELAYED RELEASE ORAL at 06:15

## 2023-11-19 RX ADMIN — ATORVASTATIN CALCIUM 80 MILLIGRAM(S): 80 TABLET, FILM COATED ORAL at 21:25

## 2023-11-19 RX ADMIN — BUDESONIDE AND FORMOTEROL FUMARATE DIHYDRATE 2 PUFF(S): 160; 4.5 AEROSOL RESPIRATORY (INHALATION) at 08:39

## 2023-11-19 RX ADMIN — GABAPENTIN 400 MILLIGRAM(S): 400 CAPSULE ORAL at 06:15

## 2023-11-19 RX ADMIN — Medication 25 MILLIGRAM(S): at 21:24

## 2023-11-19 RX ADMIN — BUDESONIDE AND FORMOTEROL FUMARATE DIHYDRATE 2 PUFF(S): 160; 4.5 AEROSOL RESPIRATORY (INHALATION) at 20:34

## 2023-11-19 RX ADMIN — GABAPENTIN 400 MILLIGRAM(S): 400 CAPSULE ORAL at 21:25

## 2023-11-19 RX ADMIN — Medication 10 MILLIGRAM(S): at 10:40

## 2023-11-19 RX ADMIN — PREGABALIN 1000 MICROGRAM(S): 225 CAPSULE ORAL at 10:39

## 2023-11-19 RX ADMIN — ENOXAPARIN SODIUM 90 MILLIGRAM(S): 100 INJECTION SUBCUTANEOUS at 10:43

## 2023-11-19 RX ADMIN — CLOPIDOGREL BISULFATE 75 MILLIGRAM(S): 75 TABLET, FILM COATED ORAL at 10:53

## 2023-11-19 RX ADMIN — ENOXAPARIN SODIUM 90 MILLIGRAM(S): 100 INJECTION SUBCUTANEOUS at 21:24

## 2023-11-19 RX ADMIN — Medication 1 MILLIGRAM(S): at 10:39

## 2023-11-19 RX ADMIN — Medication 180 MILLIGRAM(S): at 10:39

## 2023-11-19 RX ADMIN — Medication 40 MILLIGRAM(S): at 10:56

## 2023-11-19 RX ADMIN — Medication 25 MILLIGRAM(S): at 10:42

## 2023-11-19 RX ADMIN — SPIRONOLACTONE 50 MILLIGRAM(S): 25 TABLET, FILM COATED ORAL at 10:39

## 2023-11-19 RX ADMIN — Medication 650 MILLIGRAM(S): at 23:53

## 2023-11-19 RX ADMIN — QUETIAPINE FUMARATE 100 MILLIGRAM(S): 200 TABLET, FILM COATED ORAL at 21:25

## 2023-11-19 RX ADMIN — Medication 40 MILLIGRAM(S): at 06:15

## 2023-11-19 RX ADMIN — Medication 1 MILLIGRAM(S): at 21:25

## 2023-11-19 NOTE — CONSULT NOTE ADULT - PROBLEM SELECTOR RECOMMENDATION 2
-mild fluid overload on exam   -h/o recurrent CHF exacerbations for HFpEF.     agree w/ lasix 40 IV BID reassess tommorrow. -mild fluid overload on exam   -h/o recurrent CHF exacerbations for HFpEF.    -agree w/ lasix 40 IV BID reassess tommorrow.

## 2023-11-19 NOTE — PROGRESS NOTE ADULT - SUBJECTIVE AND OBJECTIVE BOX
Interval History:  11/19/23: Dizziness is improved, feels a little foggy    MEDICATIONS  (STANDING):  atorvastatin 80 milliGRAM(s) Oral at bedtime  budesonide 160 MICROgram(s)/formoterol 4.5 MICROgram(s) Inhaler 2 Puff(s) Inhalation two times a day  busPIRone 10 milliGRAM(s) Oral two times a day  clopidogrel Tablet 75 milliGRAM(s) Oral daily  cyanocobalamin 1000 MICROGram(s) Oral daily  diltiazem    milliGRAM(s) Oral daily  doxazosin 1 milliGRAM(s) Oral at bedtime  enoxaparin Injectable 90 milliGRAM(s) SubCutaneous every 12 hours  folic acid 1 milliGRAM(s) Oral daily  furosemide   Injectable 40 milliGRAM(s) IV Push two times a day  gabapentin 400 milliGRAM(s) Oral three times a day  influenza  Vaccine (HIGH DOSE) 0.7 milliLiter(s) IntraMuscular once  metoprolol tartrate 25 milliGRAM(s) Oral two times a day  multivitamin 1 Tablet(s) Oral daily  pantoprazole    Tablet 40 milliGRAM(s) Oral before breakfast  psyllium Powder 2 Packet(s) Oral daily  QUEtiapine 100 milliGRAM(s) Oral at bedtime  spironolactone 50 milliGRAM(s) Oral daily  thiamine 100 milliGRAM(s) Oral daily  tiotropium 2.5 MICROgram(s) Inhaler 2 Puff(s) Inhalation daily  zaleplon 5 milliGRAM(s) Oral at bedtime    MEDICATIONS  (PRN):  acetaminophen     Tablet .. 650 milliGRAM(s) Oral every 6 hours PRN Mild Pain (1 - 3)  albuterol    90 MICROgram(s) HFA Inhaler 2 Puff(s) Inhalation every 6 hours PRN Shortness of Breath and/or Wheezing  meclizine 25 milliGRAM(s) Oral every 8 hours PRN Dizziness  ondansetron Injectable 4 milliGRAM(s) IV Push every 6 hours PRN Nausea and/or Vomiting      Allergies    Duricef (Rash)  Ceclor (Rash)    Intolerances        PHYSICAL EXAM:  Vital Signs Last 24 Hrs  T(F): 97.8 (11-19-23 @ 07:43)  HR: 85 (11-19-23 @ 08:45)  BP: 119/59 (11-19-23 @ 07:43)  RR: 18 (11-19-23 @ 07:43)    GENERAL: NAD, well-groomed, well-developed  HEAD:  Atraumatic, Normocephalic  Neuro:  Awake, alert, no aphasia  CN: PERRL, EOMI, no nystagmus, no facial weakness, tongue protrudes in the midline  motor: normal tone, no pronator drift, full strength in all four extremities, left BKA noted  sensory: intact to light touch  coordination: finger to nose intact bilaterally      LABS:                        10.8   8.37  )-----------( 171      ( 17 Nov 2023 23:42 )             33.6     11-17    136  |  101  |  11  ----------------------------<  136<H>  4.2   |  29  |  0.77    Ca    8.4<L>      17 Nov 2023 23:42    TPro  7.0  /  Alb  2.7<L>  /  TBili  0.4  /  DBili  x   /  AST  24  /  ALT  13  /  AlkPhos  84  11-17    PT/INR - ( 18 Nov 2023 01:49 )   PT: 12.2 sec;   INR: 1.08 ratio         PTT - ( 18 Nov 2023 01:49 )  PTT:31.9 sec  Urinalysis Basic - ( 18 Nov 2023 04:44 )    Color: Yellow / Appearance: Clear / SG: >1.030 / pH: x  Gluc: x / Ketone: Trace mg/dL  / Bili: Negative / Urobili: 0.2 mg/dL   Blood: x / Protein: Negative mg/dL / Nitrite: Negative   Leuk Esterase: Negative / RBC: x / WBC x   Sq Epi: x / Non Sq Epi: x / Bacteria: x        RADIOLOGY & ADDITIONAL STUDIES:  CT head, CTA head and neck 11/18/23:  CT HEAD:  No acute intracranial findings.    CTA HEAD:  No flow-limiting stenosis, occlusion or aneurysm.    CTA NECK:  Moderate, 60%, narrowing right ICA origin.  No other significant arterial narrowing in the neck.    MRI head 11/18/23:  Age-appropriate involutional and ischemic gliotic changes.   Tiny acute infarct right frontal precentral gyrus.    Carotid ultrasound 11/18/23:  No significant hemodynamic stenosis of either carotid artery.

## 2023-11-19 NOTE — DISCHARGE NOTE NURSING/CASE MANAGEMENT/SOCIAL WORK - NSDCVIVACCINE_GEN_ALL_CORE_FT
influenza, injectable, quadrivalent, preservative free; 21-Nov-2019 14:53; Cathryn Burks (RN); [employer]; [id number] (Exp. Date: 30-Jun-2020); IntraMuscular; Deltoid Left.; 0.5 milliLiter(s); VIS (VIS Published: 15-Aug-2019, VIS Presented: 21-Nov-2019);   influenza, injectable, quadrivalent, preservative free; 17-Oct-2020 16:13; Bhavya Brown (RN); [employer]; [id number] (Exp. Date: 30-Jun-2021); IntraMuscular; Deltoid Left.; 0.5 milliLiter(s); VIS (VIS Published: 15-Aug-2019, VIS Presented: 17-Oct-2020);

## 2023-11-19 NOTE — PROGRESS NOTE ADULT - ASSESSMENT
MR. Boone is a 69 y/o Male with a PMHx of anemia, Afib, CHF, COPD, cirrhosis, collapsed lung, emphysema  EtOH abuse, EtOH withdrawal, falls, GERD, HTN, meningitis, presence of Watchman left atrial appendage closure device presented with persistent dizziness/ vertigo.    His neurological examination is non-focal.    Dizziness:  Differential diagnosis includes benign peripheral vertigo vs stroke.  -MRI brain does show a tiny acute infarct in the right frontal precentral gyrus.  -I do not think this is necessarily the cause of his dizziness. Dizziness is improving  -Meclizine prn    Stroke:  - tiny acute infarct in the right frontal precentral gyrus noted on MRI  -Was on aspirin + Plavix at home.  -Has persistent afib, s/p Watchman several years ago  -Started on Lovenox  -Plavix continued and aspirin d/c'd  -Continue statin  -Possible plan for MYA to see if there is failure of Watchman, thrombus.   - 60% stenosis of right ICA seen on CTA, but carotid ultrasound shows no significant stenosis.    Discussed with patient, Dr. Soto, Dr. Middleton  Will follow

## 2023-11-19 NOTE — PROGRESS NOTE ADULT - SUBJECTIVE AND OBJECTIVE BOX
Cheif complaints and Diagnosis: acute CVA/ afib/ watchmans device    Subjective: patient slightly out of breath today, a bit tachypnic. crackles and rhonchi b/l lung feilds      REVIEW OF SYSTEMS:    CONSTITUTIONAL: No weakness, fevers or chills  EYES/ENT: No visual changes;  No vertigo or throat pain   NECK: No pain or stiffness  RESPIRATORY: No cough, wheezing, hemoptysis; No shortness of breath  CARDIOVASCULAR: No chest pain or palpitations  GASTROINTESTINAL: No abdominal or epigastric pain. No nausea, vomiting, or hematemesis; No diarrhea or constipation. No melena or hematochezia.  GENITOURINARY: No dysuria, frequency or hematuria  NEUROLOGICAL: No numbness or weakness  SKIN: No itching, burning, rashes, or lesions   All other review of systems is negative unless indicated above      Vital Signs Last 24 Hrs  T(C): 36.6 (19 Nov 2023 07:43), Max: 36.6 (19 Nov 2023 06:05)  T(F): 97.8 (19 Nov 2023 07:43), Max: 97.8 (19 Nov 2023 06:05)  HR: 85 (19 Nov 2023 08:45) (66 - 88)  BP: 119/59 (19 Nov 2023 07:43) (105/54 - 133/64)  BP(mean): --  RR: 18 (19 Nov 2023 07:43) (18 - 18)  SpO2: 95% (19 Nov 2023 08:45) (94% - 100%)    Parameters below as of 19 Nov 2023 08:45  Patient On (Oxygen Delivery Method): nasal cannula, 4L        HEENT:   pupils equal and reactive, EOMI, no oropharyngeal lesions, erythema, exudates, oral thrush    NECK:   supple, no carotid bruits, no palpable lymph nodes, no thyromegaly    CV:  +S1, +S2, regular, no murmurs or rubs    RESP:  crackles and rhonchi b/l lung feilds    BREAST:  not examined    GI:  abdomen soft, non-tender, non-distended, normal BS, no bruits, no abdominal masses, no palpable masses    RECTAL:  not examined    :  not examined    MSK:   normal muscle tone, no atrophy, no rigidity, no contractions    EXT:   no clubbing, no cyanosis, no edema, no calf pain, swelling or erythema    VASCULAR:  pulses equal and symmetric in the upper and lower extremities    NEURO:  AAOX3, no focal neurological deficits, follows all commands, able to move extremities spontaneously    SKIN:  no ulcers, lesions or rashes    MEDICATIONS  (STANDING):  atorvastatin 80 milliGRAM(s) Oral at bedtime  budesonide 160 MICROgram(s)/formoterol 4.5 MICROgram(s) Inhaler 2 Puff(s) Inhalation two times a day  busPIRone 10 milliGRAM(s) Oral two times a day  clopidogrel Tablet 75 milliGRAM(s) Oral daily  cyanocobalamin 1000 MICROGram(s) Oral daily  diltiazem    milliGRAM(s) Oral daily  doxazosin 1 milliGRAM(s) Oral at bedtime  enoxaparin Injectable 90 milliGRAM(s) SubCutaneous every 12 hours  folic acid 1 milliGRAM(s) Oral daily  furosemide   Injectable 40 milliGRAM(s) IV Push two times a day  gabapentin 400 milliGRAM(s) Oral three times a day  influenza  Vaccine (HIGH DOSE) 0.7 milliLiter(s) IntraMuscular once  metoprolol tartrate 25 milliGRAM(s) Oral two times a day  multivitamin 1 Tablet(s) Oral daily  pantoprazole    Tablet 40 milliGRAM(s) Oral before breakfast  psyllium Powder 2 Packet(s) Oral daily  QUEtiapine 100 milliGRAM(s) Oral at bedtime  spironolactone 50 milliGRAM(s) Oral daily  thiamine 100 milliGRAM(s) Oral daily  tiotropium 2.5 MICROgram(s) Inhaler 2 Puff(s) Inhalation daily    MEDICATIONS  (PRN):  acetaminophen     Tablet .. 650 milliGRAM(s) Oral every 6 hours PRN Mild Pain (1 - 3)  albuterol    90 MICROgram(s) HFA Inhaler 2 Puff(s) Inhalation every 6 hours PRN Shortness of Breath and/or Wheezing  meclizine 25 milliGRAM(s) Oral every 8 hours PRN Dizziness  ondansetron Injectable 4 milliGRAM(s) IV Push every 6 hours PRN Nausea and/or Vomiting      Urinalysis Basic - ( 18 Nov 2023 04:44 )    Color: Yellow / Appearance: Clear / SG: >1.030 / pH: x  Gluc: x / Ketone: Trace mg/dL  / Bili: Negative / Urobili: 0.2 mg/dL   Blood: x / Protein: Negative mg/dL / Nitrite: Negative   Leuk Esterase: Negative / RBC: x / WBC x   Sq Epi: x / Non Sq Epi: x / Bacteria: x    17 Nov 2023 23:42    136    |  101    |  11     ----------------------------<  136    4.2     |  29     |  0.77     Ca    8.4        17 Nov 2023 23:42    TPro  7.0    /  Alb  2.7    /  TBili  0.4    /  DBili  x      /  AST  24     /  ALT  13     /  AlkPhos  84     17 Nov 2023 23:42  LIVER FUNCTIONS - ( 17 Nov 2023 23:42 )  Alb: 2.7 g/dL / Pro: 7.0 gm/dL / ALK PHOS: 84 U/L / ALT: 13 U/L / AST: 24 U/L / GGT: x         PT/INR - ( 18 Nov 2023 01:49 )   PT: 12.2 sec;   INR: 1.08 ratio         PTT - ( 18 Nov 2023 01:49 )  PTT:31.9 secCBC Full  -  ( 17 Nov 2023 23:42 )  WBC Count : 8.37 K/uL  Hemoglobin : 10.8 g/dL  Hematocrit : 33.6 %  Platelet Count - Automated : 171 K/uL  Mean Cell Volume : 88.2 fl  Mean Cell Hemoglobin : 28.3 pg  Mean Cell Hemoglobin Concentration : 32.1 gm/dL  Auto Neutrophil # : 7.05 K/uL  Auto Lymphocyte # : 0.64 K/uL  Auto Monocyte # : 0.54 K/uL  Auto Eosinophil # : 0.06 K/uL  Auto Basophil # : 0.02 K/uL  Auto Neutrophil % : 84.3 %  Auto Lymphocyte % : 7.6 %  Auto Monocyte % : 6.5 %  Auto Eosinophil % : 0.7 %  Auto Basophil % : 0.2 %            PT/INR - ( 18 Nov 2023 01:49 )   PT: 12.2 sec;   INR: 1.08 ratio         PTT - ( 18 Nov 2023 01:49 )  PTT:31.9 sec          Assessment and Plan:        69 y/o Male with a PMHx of anemia, Afib, CHF, COPD, cirrhosis, collapsed lung, emphysema  EtOH abuse, EtOH withdrawal, falls, GERD, HTN, meningitis, presence of Watchman left atrial appendage closure device presented with persistent dizziness/ vertigo. Patient reports he has had similar issues. Symptoms started suddenly while at home yesterday while walking, patient sat himself down, denies falls or injuries.  Reported constant dizziness and spinning sensation associated with nausea, no vomiting.  Also reports mild headache and dry mouth. No chest pain. No SOB. No recent sickness. No recent travel.     1.  Persistent Dizziness  Persistent Vertigo  -MRI- Acute CVA- Tiny acute infarct right frontal precentral gyrus.  -follow clinically with Neuro checks in telemetry unit  -Neuro consult appreciated  -f/u Echo  -cardio input appreciated- patient with watchmans device and new cva. willl need cardio and neuro input  -c/w plavix and full dose lovenox    2-persistent  afib,   noted on telemetry around 2:30PM  -start full dose lovenox given acute cva with watchmans until can ensure watchman is working appropriately  -may need MAY    3-right ICA  stenosis  -CTA shows 60% stenosis  -u/s shows no sig stenosis of either ICA  -vascular consult appreciated      2.  Acute on Chronic Diastolic CHF  patient with acute crackles/ rhonchi today; start IV lasix 40 BID  -will get chest xray stat    3.  COPD  -stable  -continue Albuterol, Symbicort and Spiriva    4.  SCD for DVT ppx    5.  Code status: Full code

## 2023-11-19 NOTE — DISCHARGE NOTE NURSING/CASE MANAGEMENT/SOCIAL WORK - PATIENT PORTAL LINK FT
You can access the FollowMyHealth Patient Portal offered by [facility] by registering at the following website: [email or web address]. By joining [facility]’s FollowMyHealth portal, you will also be able to view your health information using other applications (apps) compatible with our system.

## 2023-11-19 NOTE — CONSULT NOTE ADULT - PROBLEM SELECTOR RECOMMENDATION 9
-per neurology very small right frontal stroke may be due to cardioembolic causes.  -discussed w/ Dr. Lenz, device associated thrombus or peridevice leak  may be a cause for CVA in this pt w/ watchman device.    -lovenox started after discussion w/ hospitalist & neurology team,  -Keep NPO for MYA tommorrow to evaluate watchman device.  -if MYA w/ no abnormalities, will d/c lovenox.

## 2023-11-19 NOTE — DISCHARGE NOTE NURSING/CASE MANAGEMENT/SOCIAL WORK - NSDCPEFALRISK_GEN_ALL_CORE
For information on Fall & Injury Prevention, visit: [email or web address] OR  [email or web address] OR  https://www.cdc.gov/steadi/patient.html

## 2023-11-19 NOTE — CONSULT NOTE ADULT - SUBJECTIVE AND OBJECTIVE BOX
HPI:  67 y/o Male with a PMHx of anemia, Afib, CHF, COPD, cirrhosis, collapsed lung, emphysema  EtOH abuse, EtOH withdrawal, falls, GERD, HTN, meningitis, presence of Watchman left atrial appendage closure device presented with persistent dizziness/ vertigo. Patient reports he has had similar issues. Symptoms started suddenly while at home yesterday while walking, patient sat himself down, denies falls or injuries.  Reported constant dizziness and spinning sensation associated with nausea, no vomiting.  Also reports mild headache and dry mouth. No chest pain. No SOB. No recent sickness. No recent travel.  (18 Nov 2023 06:20)    Admitted for CVA. MRI showed "tiny acute infarct right frontal precentral gyrus"  Pt has h/o permanent afib w/ watchman device implanted 8 yrs ago at hospital in [place].  Reports some LE edema today worse since admission no dyspnea.    PAST MEDICAL AND SURGICAL HISTORY:  PAST MEDICAL & SURGICAL HISTORY:  Chronic atrial fibrillation      Alcohol abuse      Poor historian      CHF (congestive heart failure)      Cirrhosis      Emphysema of lung      Alcohol withdrawal      Collapsed lung      Meningitis      Falls      Anemia      Chronic obstructive pulmonary disease (COPD)      GERD (gastroesophageal reflux disease)      Hypertension      Presence of Watchman left atrial appendage closure device      Atrial fibrillation      COPD without exacerbation      Chronic CHF      S/P BKA (below knee amputation) unilateral, left      Presence of Watchman left atrial appendage closure device      Unilateral amputation of lower extremity below knee      H/O tracheostomy  now removed      S/P percutaneous endoscopic gastrostomy (PEG) tube placement  now removed          ALLERGIES:  Allergies    Duricef (Rash)  Ceclor (Rash)    Intolerances        SOCIAL HISTORY:  Social History:  Non smoker.  +Drinks alcohol   No other illicit drug abuse. (18 Nov 2023 06:20)      FAMILY  HISTORY:  FAMILY HISTORY:  No pertinent family history in first degree relatives        MEDICATIONS:  OUTPATIENT:  Home Medications:  albuterol 90 mcg/inh inhalation aerosol: 2 puff(s) inhaled every 4 hours, As Needed for wheezing (18 Nov 2023 05:46)  Aspirin Enteric Coated 81 mg oral delayed release tablet: 1 tab(s) orally once a day (18 Nov 2023 05:46)  budesonide-formoterol 160 mcg-4.5 mcg/inh inhalation aerosol: 2 puff(s) inhaled 2 times a day (18 Nov 2023 05:46)  busPIRone 10 mg oral tablet: 1 tab(s) orally 2 times a day (18 Nov 2023 05:46)  cholecalciferol 25 mcg (1000 intl units) oral tablet: 1 tab(s) orally once a day (18 Nov 2023 05:46)  Colace 100 mg oral capsule: 2 cap(s) orally once a day (18 Nov 2023 05:46)  cyanocobalamin 1000 mcg oral tablet: 1 tab(s) orally once a day (18 Nov 2023 05:46)  doxazosin 1 mg oral tablet: 1 tab(s) orally once a day (at bedtime) (18 Nov 2023 05:46)  Farxiga 10 mg oral tablet: 1 tab(s) orally once a day (18 Nov 2023 05:46)  folic acid 1 mg oral tablet: 1 tab(s) orally once a day (18 Nov 2023 05:46)  furosemide 20 mg oral tablet: 2 tab(s) orally once a day (18 Nov 2023 05:46)  gabapentin 400 mg oral capsule: 1 cap(s) orally 3 times a day (18 Nov 2023 05:46)  Metoprolol Tartrate 25 mg oral tablet: 1 tab(s) orally 2 times a day (18 Nov 2023 05:46)  omeprazole 40 mg oral delayed release capsule: 1 cap(s) orally once a day (18 Nov 2023 05:46)  psyllium 500 mg oral capsule: 2 cap(s) orally once a day (18 Nov 2023 05:46)  QUEtiapine 50 mg oral tablet: 2 tab(s) orally once a day (at bedtime) (18 Nov 2023 05:46)  spironolactone 25 mg oral tablet: 2 tab(s) orally once a day (18 Nov 2023 05:46)  thiamine 100 mg oral tablet: 1 tab(s) orally once a day (18 Nov 2023 05:46)      INPATIENT:  MEDICATIONS  (STANDING):  atorvastatin 80 milliGRAM(s) Oral at bedtime  budesonide 160 MICROgram(s)/formoterol 4.5 MICROgram(s) Inhaler 2 Puff(s) Inhalation two times a day  busPIRone 10 milliGRAM(s) Oral two times a day  clopidogrel Tablet 75 milliGRAM(s) Oral daily  cyanocobalamin 1000 MICROGram(s) Oral daily  diltiazem    milliGRAM(s) Oral daily  doxazosin 1 milliGRAM(s) Oral at bedtime  enoxaparin Injectable 90 milliGRAM(s) SubCutaneous every 12 hours  folic acid 1 milliGRAM(s) Oral daily  furosemide   Injectable 40 milliGRAM(s) IV Push two times a day  gabapentin 400 milliGRAM(s) Oral three times a day  influenza  Vaccine (HIGH DOSE) 0.7 milliLiter(s) IntraMuscular once  metoprolol tartrate 25 milliGRAM(s) Oral two times a day  multivitamin 1 Tablet(s) Oral daily  pantoprazole    Tablet 40 milliGRAM(s) Oral before breakfast  psyllium Powder 2 Packet(s) Oral daily  QUEtiapine 100 milliGRAM(s) Oral at bedtime  spironolactone 50 milliGRAM(s) Oral daily  thiamine 100 milliGRAM(s) Oral daily  tiotropium 2.5 MICROgram(s) Inhaler 2 Puff(s) Inhalation daily  zaleplon 5 milliGRAM(s) Oral at bedtime    MEDICATIONS  (PRN):  acetaminophen     Tablet .. 650 milliGRAM(s) Oral every 6 hours PRN Mild Pain (1 - 3)  albuterol    90 MICROgram(s) HFA Inhaler 2 Puff(s) Inhalation every 6 hours PRN Shortness of Breath and/or Wheezing  meclizine 25 milliGRAM(s) Oral every 8 hours PRN Dizziness  ondansetron Injectable 4 milliGRAM(s) IV Push every 6 hours PRN Nausea and/or Vomiting    MEDICATIONS  (PRN):  acetaminophen     Tablet .. 650 milliGRAM(s) Oral every 6 hours PRN Mild Pain (1 - 3)  albuterol    90 MICROgram(s) HFA Inhaler 2 Puff(s) Inhalation every 6 hours PRN Shortness of Breath and/or Wheezing  meclizine 25 milliGRAM(s) Oral every 8 hours PRN Dizziness  ondansetron Injectable 4 milliGRAM(s) IV Push every 6 hours PRN Nausea and/or Vomiting      REVIEW OF SYSTEMS:  ===============================  ===============================    Vital Signs Last 24 Hrs  T(C): 36.6 (19 Nov 2023 07:43), Max: 36.6 (19 Nov 2023 06:05)  T(F): 97.8 (19 Nov 2023 07:43), Max: 97.8 (19 Nov 2023 06:05)  HR: 85 (19 Nov 2023 08:45) (66 - 88)  BP: 119/59 (19 Nov 2023 07:43) (105/54 - 133/64)  BP(mean): --  RR: 18 (19 Nov 2023 07:43) (18 - 18)  SpO2: 95% (19 Nov 2023 08:45) (95% - 100%)    Parameters below as of 19 Nov 2023 08:45  Patient On (Oxygen Delivery Method): nasal cannula, 4L        I&O's Summary    19 Nov 2023 07:01  -  19 Nov 2023 19:05  --------------------------------------------------------  IN: 0 mL / OUT: 1300 mL / NET: -1300 mL        I&O's Detail    19 Nov 2023 07:01  -  19 Nov 2023 19:05  --------------------------------------------------------  IN:  Total IN: 0 mL    OUT:    Voided (mL): 1300 mL  Total OUT: 1300 mL    Total NET: -1300 mL          PHYSICAL EXAM:    Constitutional: NAD, awake   HEENT: PERR, EOMI,  No oral cyananosis.  Neck:  supple,  No JVD  Respiratory: Breath sounds are clear bilaterally, No wheezing, rales or rhonchi  Cardiovascular: S1 and S2, irregular rate and rhythm, no Murmurs, gallops or rubs  Gastrointestinal: Bowel Sounds present, soft, nontender.   Extremities: +1 edema bilat.. No clubbing or cyanosis.  Vascular: 2+ peripheral pulses  Neurological: A/O x 3, no focal deficits  Musculoskeletal: no calf tenderness.  Skin: No rashes.    ===============================  ===============================  LABS:                         10.8   8.37  )-----------( 171      ( 17 Nov 2023 23:42 )             33.6     17 Nov 2023 23:42    136    |  101    |  11     ----------------------------<  136    4.2     |  29     |  0.77     Ca    8.4        17 Nov 2023 23:42    TPro  7.0    /  Alb  2.7    /  TBili  0.4    /  DBili  x      /  AST  24     /  ALT  13     /  AlkPhos  84     17 Nov 2023 23:42    PT/INR - ( 18 Nov 2023 01:49 )   PT: 12.2 sec;   INR: 1.08 ratio         PTT - ( 18 Nov 2023 01:49 )  PTT:31.9 sec    THYROID STUDIES:    ===============================  ===============================  CARDIAC BIOMARKERS:  -------  -BNP VALUES:  11-13 @ 06:45  Pro Bnp 3031  11-09 @ 09:24  Pro Bnp [id number]    -------  -TROPONIN VALUES:   Troponin I, High Sensitivity Result: 7.86 ng/L (08-30-23 @ 07:31)  Troponin I, High Sensitivity Result: 5.72 ng/L (07-09-23 @ 07:07)  Troponin I, High Sensitivity Result: 7.54 ng/L (07-08-23 @ 09:51)  Troponin I, High Sensitivity Result: 8.42 ng/L (05-28-23 @ 06:51)  Troponin I, High Sensitivity Result: 7.18 ng/L (05-27-23 @ 09:22)  Troponin I, High Sensitivity Result: 7.58 ng/L (05-27-23 @ 01:23)  Troponin I, High Sensitivity Result: 15.54 ng/L (04-22-23 @ 04:05)  Troponin I, High Sensitivity Result: 15.75 ng/L (04-22-23 @ 00:41)  Troponin I, High Sensitivity Result: 14.31 ng/L (11-09-21 @ 12:13)  Troponin I, High Sensitivity Result: 19.27 ng/L (11-09-21 @ 09:24)      ===============================  ===============================  EKG: afib rate controlled.

## 2023-11-19 NOTE — CONSULT NOTE ADULT - PROBLEM SELECTOR RECOMMENDATION 3
-rate controlled  -s/p watchman implanted Apr '19 at [facility].  indication- CHADS2-VASC>2, anticoag contraind. due to ETOHism & frequent falls.    -cont. metoprolol, diltiazem for rate control.  -Start lovenox as noted above given CVA w/ possible cardioembolic causes  due to device associated thrombus

## 2023-11-20 LAB
ANION GAP SERPL CALC-SCNC: 3 MMOL/L — LOW (ref 5–17)
ANION GAP SERPL CALC-SCNC: 3 MMOL/L — LOW (ref 5–17)
BUN SERPL-MCNC: 11 MG/DL — SIGNIFICANT CHANGE UP (ref 7–23)
BUN SERPL-MCNC: 11 MG/DL — SIGNIFICANT CHANGE UP (ref 7–23)
CALCIUM SERPL-MCNC: 8.9 MG/DL — SIGNIFICANT CHANGE UP (ref 8.5–10.1)
CALCIUM SERPL-MCNC: 8.9 MG/DL — SIGNIFICANT CHANGE UP (ref 8.5–10.1)
CHLORIDE SERPL-SCNC: 99 MMOL/L — SIGNIFICANT CHANGE UP (ref 96–108)
CHLORIDE SERPL-SCNC: 99 MMOL/L — SIGNIFICANT CHANGE UP (ref 96–108)
CO2 SERPL-SCNC: 33 MMOL/L — HIGH (ref 22–31)
CO2 SERPL-SCNC: 33 MMOL/L — HIGH (ref 22–31)
CREAT SERPL-MCNC: 0.77 MG/DL — SIGNIFICANT CHANGE UP (ref 0.5–1.3)
CREAT SERPL-MCNC: 0.77 MG/DL — SIGNIFICANT CHANGE UP (ref 0.5–1.3)
EGFR: 99 ML/MIN/1.73M2 — SIGNIFICANT CHANGE UP
EGFR: 99 ML/MIN/1.73M2 — SIGNIFICANT CHANGE UP
GLUCOSE SERPL-MCNC: 97 MG/DL — SIGNIFICANT CHANGE UP (ref 70–99)
GLUCOSE SERPL-MCNC: 97 MG/DL — SIGNIFICANT CHANGE UP (ref 70–99)
HCT VFR BLD CALC: 34.9 % — LOW (ref 39–50)
HCT VFR BLD CALC: 34.9 % — LOW (ref 39–50)
HGB BLD-MCNC: 10.8 G/DL — LOW (ref 13–17)
HGB BLD-MCNC: 10.8 G/DL — LOW (ref 13–17)
MCHC RBC-ENTMCNC: 28 PG — SIGNIFICANT CHANGE UP (ref 27–34)
MCHC RBC-ENTMCNC: 28 PG — SIGNIFICANT CHANGE UP (ref 27–34)
MCHC RBC-ENTMCNC: 30.9 GM/DL — LOW (ref 32–36)
MCHC RBC-ENTMCNC: 30.9 GM/DL — LOW (ref 32–36)
MCV RBC AUTO: 90.4 FL — SIGNIFICANT CHANGE UP (ref 80–100)
MCV RBC AUTO: 90.4 FL — SIGNIFICANT CHANGE UP (ref 80–100)
PLATELET # BLD AUTO: 183 K/UL — SIGNIFICANT CHANGE UP (ref 150–400)
PLATELET # BLD AUTO: 183 K/UL — SIGNIFICANT CHANGE UP (ref 150–400)
POTASSIUM SERPL-MCNC: 4.1 MMOL/L — SIGNIFICANT CHANGE UP (ref 3.5–5.3)
POTASSIUM SERPL-MCNC: 4.1 MMOL/L — SIGNIFICANT CHANGE UP (ref 3.5–5.3)
POTASSIUM SERPL-SCNC: 4.1 MMOL/L — SIGNIFICANT CHANGE UP (ref 3.5–5.3)
POTASSIUM SERPL-SCNC: 4.1 MMOL/L — SIGNIFICANT CHANGE UP (ref 3.5–5.3)
RBC # BLD: 3.86 M/UL — LOW (ref 4.2–5.8)
RBC # BLD: 3.86 M/UL — LOW (ref 4.2–5.8)
RBC # FLD: 16.3 % — HIGH (ref 10.3–14.5)
RBC # FLD: 16.3 % — HIGH (ref 10.3–14.5)
SODIUM SERPL-SCNC: 135 MMOL/L — SIGNIFICANT CHANGE UP (ref 135–145)
SODIUM SERPL-SCNC: 135 MMOL/L — SIGNIFICANT CHANGE UP (ref 135–145)
WBC # BLD: 6.39 K/UL — SIGNIFICANT CHANGE UP (ref 3.8–10.5)
WBC # BLD: 6.39 K/UL — SIGNIFICANT CHANGE UP (ref 3.8–10.5)
WBC # FLD AUTO: 6.39 K/UL — SIGNIFICANT CHANGE UP (ref 3.8–10.5)
WBC # FLD AUTO: 6.39 K/UL — SIGNIFICANT CHANGE UP (ref 3.8–10.5)

## 2023-11-20 PROCEDURE — 99497 ADVNCD CARE PLAN 30 MIN: CPT

## 2023-11-20 PROCEDURE — 93325 DOPPLER ECHO COLOR FLOW MAPG: CPT | Mod: 26

## 2023-11-20 PROCEDURE — 99232 SBSQ HOSP IP/OBS MODERATE 35: CPT

## 2023-11-20 PROCEDURE — 93320 DOPPLER ECHO COMPLETE: CPT | Mod: 26

## 2023-11-20 PROCEDURE — 93312 ECHO TRANSESOPHAGEAL: CPT | Mod: 26

## 2023-11-20 PROCEDURE — 99233 SBSQ HOSP IP/OBS HIGH 50: CPT

## 2023-11-20 RX ORDER — IPRATROPIUM/ALBUTEROL SULFATE 18-103MCG
3 AEROSOL WITH ADAPTER (GRAM) INHALATION ONCE
Refills: 0 | Status: COMPLETED | OUTPATIENT
Start: 2023-11-20 | End: 2023-11-20

## 2023-11-20 RX ADMIN — Medication 10 MILLIGRAM(S): at 10:09

## 2023-11-20 RX ADMIN — Medication 180 MILLIGRAM(S): at 10:09

## 2023-11-20 RX ADMIN — BUDESONIDE AND FORMOTEROL FUMARATE DIHYDRATE 2 PUFF(S): 160; 4.5 AEROSOL RESPIRATORY (INHALATION) at 11:13

## 2023-11-20 RX ADMIN — Medication 40 MILLIGRAM(S): at 16:41

## 2023-11-20 RX ADMIN — Medication 25 MILLIGRAM(S): at 21:36

## 2023-11-20 RX ADMIN — GABAPENTIN 400 MILLIGRAM(S): 400 CAPSULE ORAL at 21:36

## 2023-11-20 RX ADMIN — Medication 1 MILLIGRAM(S): at 21:36

## 2023-11-20 RX ADMIN — Medication 10 MILLIGRAM(S): at 21:36

## 2023-11-20 RX ADMIN — PANTOPRAZOLE SODIUM 40 MILLIGRAM(S): 20 TABLET, DELAYED RELEASE ORAL at 05:45

## 2023-11-20 RX ADMIN — ATORVASTATIN CALCIUM 80 MILLIGRAM(S): 80 TABLET, FILM COATED ORAL at 21:37

## 2023-11-20 RX ADMIN — Medication 25 MILLIGRAM(S): at 10:09

## 2023-11-20 RX ADMIN — CLOPIDOGREL BISULFATE 75 MILLIGRAM(S): 75 TABLET, FILM COATED ORAL at 10:09

## 2023-11-20 RX ADMIN — Medication 3 MILLILITER(S): at 14:55

## 2023-11-20 RX ADMIN — QUETIAPINE FUMARATE 100 MILLIGRAM(S): 200 TABLET, FILM COATED ORAL at 21:36

## 2023-11-20 RX ADMIN — ENOXAPARIN SODIUM 90 MILLIGRAM(S): 100 INJECTION SUBCUTANEOUS at 21:38

## 2023-11-20 RX ADMIN — ENOXAPARIN SODIUM 90 MILLIGRAM(S): 100 INJECTION SUBCUTANEOUS at 16:41

## 2023-11-20 RX ADMIN — Medication 5 MILLIGRAM(S): at 23:07

## 2023-11-20 RX ADMIN — TIOTROPIUM BROMIDE 2 PUFF(S): 18 CAPSULE ORAL; RESPIRATORY (INHALATION) at 11:13

## 2023-11-20 RX ADMIN — BUDESONIDE AND FORMOTEROL FUMARATE DIHYDRATE 2 PUFF(S): 160; 4.5 AEROSOL RESPIRATORY (INHALATION) at 19:59

## 2023-11-20 RX ADMIN — GABAPENTIN 400 MILLIGRAM(S): 400 CAPSULE ORAL at 05:45

## 2023-11-20 RX ADMIN — Medication 650 MILLIGRAM(S): at 23:06

## 2023-11-20 RX ADMIN — Medication 40 MILLIGRAM(S): at 05:44

## 2023-11-20 RX ADMIN — GABAPENTIN 400 MILLIGRAM(S): 400 CAPSULE ORAL at 16:40

## 2023-11-20 NOTE — PROCEDURAL SAFETY CHECKLIST WITH OR WITHOUT SEDATION - NSPOSTCOMMENTFT_GEN_ALL_CORE
Inpatient bedside MYA/CV performed. All safety equipment in place and ready at bedside. Brief/Time out performed at bedside with all team members present @1453, anesthesia start time @1454, probe in @1457, bubbles administered @1505, probe [id number] @. Anesthesia stop time @1515.  Dr Copeland   discussed findings with patient and family. Will continue to monitor until discharge. Inpatient bedside MYA performed. All safety equipment in place and ready at bedside. Brief/Time out performed at bedside with all team members present @1453, anesthesia start time @1454, probe in @1457, bubbles administered @1505, probe [id number] @. Anesthesia stop time @1515.  Dr Copeland   discussed findings with patient and family. Will continue to monitor until discharge.

## 2023-11-20 NOTE — PROGRESS NOTE ADULT - SUBJECTIVE AND OBJECTIVE BOX
Subjective: no sob today, no CP , NPO for MYA      REVIEW OF SYSTEMS:    CONSTITUTIONAL: No weakness, fevers or chills  EYES/ENT: No visual changes;  No vertigo or throat pain   NECK: No pain or stiffness  RESPIRATORY: No cough, wheezing, hemoptysis; No shortness of breath  CARDIOVASCULAR: No chest pain or palpitations  GASTROINTESTINAL: No abdominal or epigastric pain. No nausea, vomiting, or hematemesis; No diarrhea or constipation. No melena or hematochezia.  GENITOURINARY: No dysuria, frequency or hematuria  NEUROLOGICAL: No numbness or weakness  SKIN: No itching, burning, rashes, or lesions   All other review of systems is negative unless indicated above    Physical exam   HEENT:   pupils equal and reactive, EOMI, no oropharyngeal lesions, erythema, exudates, oral thrush    NECK:   supple, no carotid bruits, no palpable lymph nodes, no thyromegaly    CV:  +S1, +S2, regular, no murmurs or rubs    RESP:  crackles and rhonchi b/l lung feilds    BREAST:  not examined    GI:  abdomen soft, non-tender, non-distended, normal BS, no bruits, no abdominal masses, no palpable masses    RECTAL:  not examined  :  not examined    MSK:   normal muscle tone, no atrophy, no rigidity, no contractions    EXT:   no clubbing, no cyanosis, no edema, no calf pain, swelling or erythema    VASCULAR:  pulses equal and symmetric in the upper and lower extremities    NEURO:  AAOX3, no focal neurological deficits, follows all commands, able to move extremities spontaneously    SKIN:  no ulcers, lesions or rashes      PHYSICAL EXAM:    Daily     Daily     ICU Vital Signs Last 24 Hrs  T(C): 36.4 (20 Nov 2023 05:00), Max: 36.6 (19 Nov 2023 20:00)  T(F): 97.5 (20 Nov 2023 05:00), Max: 97.9 (19 Nov 2023 20:00)  HR: 60 (20 Nov 2023 06:40) (60 - 80)  BP: 115/58 (20 Nov 2023 06:40) (110/61 - 115/58)  BP(mean): 72 (20 Nov 2023 05:00) (72 - 72)  ABP: --  ABP(mean): --  RR: 18 (20 Nov 2023 06:40) (17 - 18)  SpO2: 100% (20 Nov 2023 06:40) (99% - 100%)    O2 Parameters below as of 20 Nov 2023 07:42  Patient On (Oxygen Delivery Method): nasal cannula                                      10.8   6.39  )-----------( 183      ( 20 Nov 2023 06:55 )             34.9       CBC Full  -  ( 20 Nov 2023 06:55 )  WBC Count : 6.39 K/uL  RBC Count : 3.86 M/uL  Hemoglobin : 10.8 g/dL  Hematocrit : 34.9 %  Platelet Count - Automated : 183 K/uL  Mean Cell Volume : 90.4 fl  Mean Cell Hemoglobin : 28.0 pg  Mean Cell Hemoglobin Concentration : 30.9 gm/dL  Auto Neutrophil # : x  Auto Lymphocyte # : x  Auto Monocyte # : x  Auto Eosinophil # : x  Auto Basophil # : x  Auto Neutrophil % : x  Auto Lymphocyte % : x  Auto Monocyte % : x  Auto Eosinophil % : x  Auto Basophil % : x      11-20    135  |  99  |  11  ----------------------------<  97  4.1   |  33<H>  |  0.77    Ca    8.9      20 Nov 2023 06:55                      Urinalysis Basic - ( 20 Nov 2023 06:55 )    Color: x / Appearance: x / SG: x / pH: x  Gluc: 97 mg/dL / Ketone: x  / Bili: x / Urobili: x   Blood: x / Protein: x / Nitrite: x   Leuk Esterase: x / RBC: x / WBC x   Sq Epi: x / Non Sq Epi: x / Bacteria: x            MEDICATIONS  (STANDING):  atorvastatin 80 milliGRAM(s) Oral at bedtime  budesonide 160 MICROgram(s)/formoterol 4.5 MICROgram(s) Inhaler 2 Puff(s) Inhalation two times a day  busPIRone 10 milliGRAM(s) Oral two times a day  clopidogrel Tablet 75 milliGRAM(s) Oral daily  cyanocobalamin 1000 MICROGram(s) Oral daily  diltiazem    milliGRAM(s) Oral daily  doxazosin 1 milliGRAM(s) Oral at bedtime  enoxaparin Injectable 90 milliGRAM(s) SubCutaneous every 12 hours  folic acid 1 milliGRAM(s) Oral daily  furosemide   Injectable 40 milliGRAM(s) IV Push two times a day  gabapentin 400 milliGRAM(s) Oral three times a day  influenza  Vaccine (HIGH DOSE) 0.7 milliLiter(s) IntraMuscular once  metoprolol tartrate 25 milliGRAM(s) Oral two times a day  multivitamin 1 Tablet(s) Oral daily  pantoprazole    Tablet 40 milliGRAM(s) Oral before breakfast  psyllium Powder 2 Packet(s) Oral daily  QUEtiapine 100 milliGRAM(s) Oral at bedtime  spironolactone 50 milliGRAM(s) Oral daily  thiamine 100 milliGRAM(s) Oral daily  tiotropium 2.5 MICROgram(s) Inhaler 2 Puff(s) Inhalation daily  zaleplon 5 milliGRAM(s) Oral at bedtime

## 2023-11-20 NOTE — PROGRESS NOTE ADULT - SUBJECTIVE AND OBJECTIVE BOX
HPI:  69 y/o Male with a PMHx of anemia, Afib, CHF, COPD, cirrhosis, collapsed lung, emphysema  EtOH abuse, EtOH withdrawal, falls, GERD, HTN, meningitis, presence of Watchman left atrial appendage closure device presented with persistent dizziness/ vertigo. Patient reports he has had similar issues. Symptoms started suddenly while at home yesterday while walking, patient sat himself down, denies falls or injuries.  Reported constant dizziness and spinning sensation associated with nausea, no vomiting.  Also reports mild headache and dry mouth. No chest pain. No SOB. No recent sickness. No recent travel.  (18 Nov 2023 06:20)    Admitted for CVA. MRI showed "tiny acute infarct right frontal precentral gyrus"  Pt has h/o permanent afib w/ watchman device implanted 8 yrs ago at hospital in [place].  Reports some LE edema today worse since admission no dyspnea.    11/20/23: NPO for MYA today. No complaints this morning     PAST MEDICAL AND SURGICAL HISTORY:  PAST MEDICAL & SURGICAL HISTORY:  Chronic atrial fibrillation      Alcohol abuse      Poor historian      CHF (congestive heart failure)      Cirrhosis      Emphysema of lung      Alcohol withdrawal      Collapsed lung      Meningitis      Falls      Anemia      Chronic obstructive pulmonary disease (COPD)      GERD (gastroesophageal reflux disease)      Hypertension      Presence of Watchman left atrial appendage closure device      Atrial fibrillation      COPD without exacerbation      Chronic CHF      S/P BKA (below knee amputation) unilateral, left      Presence of Watchman left atrial appendage closure device      Unilateral amputation of lower extremity below knee      H/O tracheostomy  now removed      S/P percutaneous endoscopic gastrostomy (PEG) tube placement  now removed          ALLERGIES:  Allergies    Duricef (Rash)  Ceclor (Rash)    Intolerances        SOCIAL HISTORY:  Social History:  Non smoker.  +Drinks alcohol   No other illicit drug abuse. (18 Nov 2023 06:20)      FAMILY  HISTORY:  FAMILY HISTORY:  No pertinent family history in first degree relatives        MEDICATIONS:  OUTPATIENT:  Home Medications:  albuterol 90 mcg/inh inhalation aerosol: 2 puff(s) inhaled every 4 hours, As Needed for wheezing (18 Nov 2023 05:46)  Aspirin Enteric Coated 81 mg oral delayed release tablet: 1 tab(s) orally once a day (18 Nov 2023 05:46)  budesonide-formoterol 160 mcg-4.5 mcg/inh inhalation aerosol: 2 puff(s) inhaled 2 times a day (18 Nov 2023 05:46)  busPIRone 10 mg oral tablet: 1 tab(s) orally 2 times a day (18 Nov 2023 05:46)  cholecalciferol 25 mcg (1000 intl units) oral tablet: 1 tab(s) orally once a day (18 Nov 2023 05:46)  Colace 100 mg oral capsule: 2 cap(s) orally once a day (18 Nov 2023 05:46)  cyanocobalamin 1000 mcg oral tablet: 1 tab(s) orally once a day (18 Nov 2023 05:46)  doxazosin 1 mg oral tablet: 1 tab(s) orally once a day (at bedtime) (18 Nov 2023 05:46)  Farxiga 10 mg oral tablet: 1 tab(s) orally once a day (18 Nov 2023 05:46)  folic acid 1 mg oral tablet: 1 tab(s) orally once a day (18 Nov 2023 05:46)  furosemide 20 mg oral tablet: 2 tab(s) orally once a day (18 Nov 2023 05:46)  gabapentin 400 mg oral capsule: 1 cap(s) orally 3 times a day (18 Nov 2023 05:46)  Metoprolol Tartrate 25 mg oral tablet: 1 tab(s) orally 2 times a day (18 Nov 2023 05:46)  omeprazole 40 mg oral delayed release capsule: 1 cap(s) orally once a day (18 Nov 2023 05:46)  psyllium 500 mg oral capsule: 2 cap(s) orally once a day (18 Nov 2023 05:46)  QUEtiapine 50 mg oral tablet: 2 tab(s) orally once a day (at bedtime) (18 Nov 2023 05:46)  spironolactone 25 mg oral tablet: 2 tab(s) orally once a day (18 Nov 2023 05:46)  thiamine 100 mg oral tablet: 1 tab(s) orally once a day (18 Nov 2023 05:46)      INPATIENT:  MEDICATIONS  (STANDING):  atorvastatin 80 milliGRAM(s) Oral at bedtime  budesonide 160 MICROgram(s)/formoterol 4.5 MICROgram(s) Inhaler 2 Puff(s) Inhalation two times a day  busPIRone 10 milliGRAM(s) Oral two times a day  clopidogrel Tablet 75 milliGRAM(s) Oral daily  cyanocobalamin 1000 MICROGram(s) Oral daily  diltiazem    milliGRAM(s) Oral daily  doxazosin 1 milliGRAM(s) Oral at bedtime  enoxaparin Injectable 90 milliGRAM(s) SubCutaneous every 12 hours  folic acid 1 milliGRAM(s) Oral daily  furosemide   Injectable 40 milliGRAM(s) IV Push two times a day  gabapentin 400 milliGRAM(s) Oral three times a day  influenza  Vaccine (HIGH DOSE) 0.7 milliLiter(s) IntraMuscular once  metoprolol tartrate 25 milliGRAM(s) Oral two times a day  multivitamin 1 Tablet(s) Oral daily  pantoprazole    Tablet 40 milliGRAM(s) Oral before breakfast  psyllium Powder 2 Packet(s) Oral daily  QUEtiapine 100 milliGRAM(s) Oral at bedtime  spironolactone 50 milliGRAM(s) Oral daily  thiamine 100 milliGRAM(s) Oral daily  tiotropium 2.5 MICROgram(s) Inhaler 2 Puff(s) Inhalation daily  zaleplon 5 milliGRAM(s) Oral at bedtime    MEDICATIONS  (PRN):  acetaminophen     Tablet .. 650 milliGRAM(s) Oral every 6 hours PRN Mild Pain (1 - 3)  albuterol    90 MICROgram(s) HFA Inhaler 2 Puff(s) Inhalation every 6 hours PRN Shortness of Breath and/or Wheezing  meclizine 25 milliGRAM(s) Oral every 8 hours PRN Dizziness  ondansetron Injectable 4 milliGRAM(s) IV Push every 6 hours PRN Nausea and/or Vomiting    MEDICATIONS  (PRN):  acetaminophen     Tablet .. 650 milliGRAM(s) Oral every 6 hours PRN Mild Pain (1 - 3)  albuterol    90 MICROgram(s) HFA Inhaler 2 Puff(s) Inhalation every 6 hours PRN Shortness of Breath and/or Wheezing  meclizine 25 milliGRAM(s) Oral every 8 hours PRN Dizziness  ondansetron Injectable 4 milliGRAM(s) IV Push every 6 hours PRN Nausea and/or Vomiting      REVIEW OF SYSTEMS:  ===============================  ===============================    Vital Signs Last 24 Hrs  T(C): 36.6 (19 Nov 2023 07:43), Max: 36.6 (19 Nov 2023 06:05)  T(F): 97.8 (19 Nov 2023 07:43), Max: 97.8 (19 Nov 2023 06:05)  HR: 85 (19 Nov 2023 08:45) (66 - 88)  BP: 119/59 (19 Nov 2023 07:43) (105/54 - 133/64)  BP(mean): --  RR: 18 (19 Nov 2023 07:43) (18 - 18)  SpO2: 95% (19 Nov 2023 08:45) (95% - 100%)    Parameters below as of 19 Nov 2023 08:45  Patient On (Oxygen Delivery Method): nasal cannula, 4L        I&O's Summary    19 Nov 2023 07:01  -  19 Nov 2023 19:05  --------------------------------------------------------  IN: 0 mL / OUT: 1300 mL / NET: -1300 mL        I&O's Detail    19 Nov 2023 07:01  -  19 Nov 2023 19:05  --------------------------------------------------------  IN:  Total IN: 0 mL    OUT:    Voided (mL): 1300 mL  Total OUT: 1300 mL    Total NET: -1300 mL          PHYSICAL EXAM:    Constitutional: NAD, awake   HEENT: PERR, EOMI  Neck:  supple,  No JVD  Respiratory: Breath sounds are clear bilaterally, No wheezing, rales or rhonchi  Cardiovascular: S1 and S2, irregularly irregular. no Murmurs, gallops or rubs  Gastrointestinal: Bowel Sounds present, soft, nontender.   Extremities: s/p left BKA. mild edema right LE   Neurological: A/O x 3, no focal deficits  Musculoskeletal: no calf tenderness.  Skin: No rashes.    ===============================  ===============================  LABS:                         10.8   8.37  )-----------( 171      ( 17 Nov 2023 23:42 )             33.6     17 Nov 2023 23:42    136    |  101    |  11     ----------------------------<  136    4.2     |  29     |  0.77     Ca    8.4        17 Nov 2023 23:42    TPro  7.0    /  Alb  2.7    /  TBili  0.4    /  DBili  x      /  AST  24     /  ALT  13     /  AlkPhos  84     17 Nov 2023 23:42    PT/INR - ( 18 Nov 2023 01:49 )   PT: 12.2 sec;   INR: 1.08 ratio         PTT - ( 18 Nov 2023 01:49 )  PTT:31.9 sec    THYROID STUDIES:    ===============================  ===============================  CARDIAC BIOMARKERS:  -------  -BNP VALUES:  11-13 @ 06:45  Pro Bnp 3031  11-09 @ 09:24  Pro Bnp 4176    -------  -TROPONIN VALUES:   Troponin I, High Sensitivity Result: 7.86 ng/L (08-30-23 @ 07:31)  Troponin I, High Sensitivity Result: 5.72 ng/L (07-09-23 @ 07:07)  Troponin I, High Sensitivity Result: 7.54 ng/L (07-08-23 @ 09:51)  Troponin I, High Sensitivity Result: 8.42 ng/L (05-28-23 @ 06:51)  Troponin I, High Sensitivity Result: 7.18 ng/L (05-27-23 @ 09:22)  Troponin I, High Sensitivity Result: 7.58 ng/L (05-27-23 @ 01:23)  Troponin I, High Sensitivity Result: 15.54 ng/L (04-22-23 @ 04:05)  Troponin I, High Sensitivity Result: 15.75 ng/L (04-22-23 @ 00:41)  Troponin I, High Sensitivity Result: 14.31 ng/L (11-09-21 @ 12:13)  Troponin I, High Sensitivity Result: 19.27 ng/L (11-09-21 @ 09:24)      ===============================  ===============================  EKG: afib rate controlled.

## 2023-11-20 NOTE — PACU DISCHARGE NOTE - COMMENTS
pt is sp peterson. pt has recovered per CCL recovery policy with VS WNL for pt. denies cp/SOB. report given back to Doc [facility]. confirmed on telemetry w [facility] [facility]. pt is pending transport back to 3E.

## 2023-11-20 NOTE — CHART NOTE - NSCHARTNOTEFT_GEN_A_CORE
PROCEDURE NOTE: MYA    INDICATION: CVA    Pt tolerated procedure well without complications.   Pt is s/p LAAO device (Watchman). There was no evidence of jose-device thrombus. Small jose-device leak.   Please see echo report for all findings.

## 2023-11-20 NOTE — PROGRESS NOTE ADULT - PROBLEM SELECTOR PLAN 1
per neurology very small right frontal stroke may be due to cardioembolic causes.  -discussed w/ Dr. Lenz, device associated thrombus or peridevice leak  may be a cause for CVA in this pt w/ watchman device.    -lovenox started after discussion w/ hospitalist & neurology team,  -Keep NPO for MYA today to evaluate watchman device or other possible source of cardioembolic CVA and if MYA w/ no abnormalities, will d/c lovenox.

## 2023-11-20 NOTE — PROGRESS NOTE ADULT - ASSESSMENT
67 y/o Male with a PMHx of anemia, Afib, CHF, COPD, cirrhosis, collapsed lung, emphysema  EtOH abuse, EtOH withdrawal, falls, GERD, HTN, meningitis, presence of Watchman left atrial appendage closure device presented with persistent dizziness/ vertigo. Patient reports he has had similar issues. Symptoms started suddenly while at home yesterday while walking, patient sat himself down, denies falls or injuries.  Reported constant dizziness and spinning sensation associated with nausea, no vomiting.  Also reports mild headache and dry mouth. No chest pain. No SOB. No recent sickness. No recent travel.     1.  Persistent Dizziness  Persistent Vertigo  -MRI- Acute CVA- Tiny acute infarct right frontal precentral gyrus.  -follow clinically with Neuro checks in telemetry unit  -Neuro consult appreciated  -f/u Echo- left atrium severely dilated , pulm HTN, EF 60%   -cardio input appreciated- patient with watchmans device and new cva.  if on MYA no failure of watchmans then DC lovenox   -c/w plavix and full dose lovenox and statin for now     2-persistent  afib,   noted on telemetry around 2:30PM  -start full dose lovenox given acute cva with watchmans until can ensure watchman is working appropriately  MYA today  cw BB cardizem     3-right ICA  stenosis  -CTA shows 60% stenosis  -u/s shows no sig stenosis of either ICA  -vascular consult appreciated      2.  Acute on Chronic Diastolic CHF  patient with acute crackles/ rhonchi today; start IV lasix 40 BID  monitor Cr    3.  COPD  -stable  -continue Albuterol, Symbicort and Spiriva    4.  lovenox  for DVT ppx    5.  Code status: Full code

## 2023-11-20 NOTE — PROGRESS NOTE ADULT - SUBJECTIVE AND OBJECTIVE BOX
Interval History:  11/20/23: Reports continued dizziness    MEDICATIONS  (STANDING):  atorvastatin 80 milliGRAM(s) Oral at bedtime  budesonide 160 MICROgram(s)/formoterol 4.5 MICROgram(s) Inhaler 2 Puff(s) Inhalation two times a day  busPIRone 10 milliGRAM(s) Oral two times a day  clopidogrel Tablet 75 milliGRAM(s) Oral daily  cyanocobalamin 1000 MICROGram(s) Oral daily  diltiazem    milliGRAM(s) Oral daily  doxazosin 1 milliGRAM(s) Oral at bedtime  enoxaparin Injectable 90 milliGRAM(s) SubCutaneous every 12 hours  folic acid 1 milliGRAM(s) Oral daily  furosemide   Injectable 40 milliGRAM(s) IV Push two times a day  gabapentin 400 milliGRAM(s) Oral three times a day  influenza  Vaccine (HIGH DOSE) 0.7 milliLiter(s) IntraMuscular once  metoprolol tartrate 25 milliGRAM(s) Oral two times a day  multivitamin 1 Tablet(s) Oral daily  pantoprazole    Tablet 40 milliGRAM(s) Oral before breakfast  psyllium Powder 2 Packet(s) Oral daily  QUEtiapine 100 milliGRAM(s) Oral at bedtime  spironolactone 50 milliGRAM(s) Oral daily  thiamine 100 milliGRAM(s) Oral daily  tiotropium 2.5 MICROgram(s) Inhaler 2 Puff(s) Inhalation daily  zaleplon 5 milliGRAM(s) Oral at bedtime    MEDICATIONS  (PRN):  acetaminophen     Tablet .. 650 milliGRAM(s) Oral every 6 hours PRN Mild Pain (1 - 3)  albuterol    90 MICROgram(s) HFA Inhaler 2 Puff(s) Inhalation every 6 hours PRN Shortness of Breath and/or Wheezing  meclizine 25 milliGRAM(s) Oral every 8 hours PRN Dizziness  ondansetron Injectable 4 milliGRAM(s) IV Push every 6 hours PRN Nausea and/or Vomiting      Allergies    Duricef (Rash)  Ceclor (Rash)    Intolerances        PHYSICAL EXAM:  Vital Signs Last 24 Hrs  T(F): 97.5 (11-20-23 @ 05:00)  HR: 60 (11-20-23 @ 06:40)  BP: 115/58 (11-20-23 @ 06:40)  RR: 18 (11-20-23 @ 06:40)      GENERAL: NAD, well-groomed, well-developed  HEAD:  Atraumatic, Normocephalic  Neuro:  Awake, alert, no aphasia  CN: PERRL, EOMI, no nystagmus, no facial weakness, tongue protrudes in the midline  motor: normal tone, no pronator drift, full strength in all four extremities, left BKA noted  sensory: intact to light touch  coordination: finger to nose intact bilaterally        LABS:                        10.8   6.39  )-----------( 183      ( 20 Nov 2023 06:55 )             34.9     11-20    135  |  99  |  11  ----------------------------<  97  4.1   |  33<H>  |  0.77    Ca    8.9      20 Nov 2023 06:55        Urinalysis Basic - ( 20 Nov 2023 06:55 )    Color: x / Appearance: x / SG: x / pH: x  Gluc: 97 mg/dL / Ketone: x  / Bili: x / Urobili: x   Blood: x / Protein: x / Nitrite: x   Leuk Esterase: x / RBC: x / WBC x   Sq Epi: x / Non Sq Epi: x / Bacteria: x        RADIOLOGY & ADDITIONAL STUDIES:    CT head, CTA head and neck 11/18/23:  CT HEAD:  No acute intracranial findings.    CTA HEAD:  No flow-limiting stenosis, occlusion or aneurysm.    CTA NECK:  Moderate, 60%, narrowing right ICA origin.  No other significant arterial narrowing in the neck.    MRI head 11/18/23:  Age-appropriate involutional and ischemic gliotic changes.   Tiny acute infarct right frontal precentral gyrus.    Carotid ultrasound 11/18/23:  No significant hemodynamic stenosis of either carotid artery.

## 2023-11-21 DIAGNOSIS — I50.33 ACUTE ON CHRONIC DIASTOLIC (CONGESTIVE) HEART FAILURE: ICD-10-CM

## 2023-11-21 DIAGNOSIS — J96.11 CHRONIC RESPIRATORY FAILURE WITH HYPOXIA: ICD-10-CM

## 2023-11-21 DIAGNOSIS — I27.20 PULMONARY HYPERTENSION, UNSPECIFIED: ICD-10-CM

## 2023-11-21 DIAGNOSIS — E66.2 MORBID (SEVERE) OBESITY WITH ALVEOLAR HYPOVENTILATION: ICD-10-CM

## 2023-11-21 LAB
A1C WITH ESTIMATED AVERAGE GLUCOSE RESULT: 5.6 % — SIGNIFICANT CHANGE UP (ref 4–5.6)
A1C WITH ESTIMATED AVERAGE GLUCOSE RESULT: 5.6 % — SIGNIFICANT CHANGE UP (ref 4–5.6)
ADD ON TEST-SPECIMEN IN LAB: SIGNIFICANT CHANGE UP
ANION GAP SERPL CALC-SCNC: 5 MMOL/L — SIGNIFICANT CHANGE UP (ref 5–17)
ANION GAP SERPL CALC-SCNC: 5 MMOL/L — SIGNIFICANT CHANGE UP (ref 5–17)
BASOPHILS # BLD AUTO: 0.03 K/UL — SIGNIFICANT CHANGE UP (ref 0–0.2)
BASOPHILS # BLD AUTO: 0.03 K/UL — SIGNIFICANT CHANGE UP (ref 0–0.2)
BASOPHILS NFR BLD AUTO: 0.4 % — SIGNIFICANT CHANGE UP (ref 0–2)
BASOPHILS NFR BLD AUTO: 0.4 % — SIGNIFICANT CHANGE UP (ref 0–2)
BUN SERPL-MCNC: 14 MG/DL — SIGNIFICANT CHANGE UP (ref 7–23)
BUN SERPL-MCNC: 14 MG/DL — SIGNIFICANT CHANGE UP (ref 7–23)
CALCIUM SERPL-MCNC: 8.8 MG/DL — SIGNIFICANT CHANGE UP (ref 8.5–10.1)
CALCIUM SERPL-MCNC: 8.8 MG/DL — SIGNIFICANT CHANGE UP (ref 8.5–10.1)
CHLORIDE SERPL-SCNC: 99 MMOL/L — SIGNIFICANT CHANGE UP (ref 96–108)
CHLORIDE SERPL-SCNC: 99 MMOL/L — SIGNIFICANT CHANGE UP (ref 96–108)
CHOLEST SERPL-MCNC: 119 MG/DL — SIGNIFICANT CHANGE UP
CHOLEST SERPL-MCNC: 119 MG/DL — SIGNIFICANT CHANGE UP
CO2 SERPL-SCNC: 32 MMOL/L — HIGH (ref 22–31)
CO2 SERPL-SCNC: 32 MMOL/L — HIGH (ref 22–31)
CREAT SERPL-MCNC: 0.88 MG/DL — SIGNIFICANT CHANGE UP (ref 0.5–1.3)
CREAT SERPL-MCNC: 0.88 MG/DL — SIGNIFICANT CHANGE UP (ref 0.5–1.3)
EGFR: 95 ML/MIN/1.73M2 — SIGNIFICANT CHANGE UP
EGFR: 95 ML/MIN/1.73M2 — SIGNIFICANT CHANGE UP
EOSINOPHIL # BLD AUTO: 0.18 K/UL — SIGNIFICANT CHANGE UP (ref 0–0.5)
EOSINOPHIL # BLD AUTO: 0.18 K/UL — SIGNIFICANT CHANGE UP (ref 0–0.5)
EOSINOPHIL NFR BLD AUTO: 2.5 % — SIGNIFICANT CHANGE UP (ref 0–6)
EOSINOPHIL NFR BLD AUTO: 2.5 % — SIGNIFICANT CHANGE UP (ref 0–6)
ESTIMATED AVERAGE GLUCOSE: 114 MG/DL — SIGNIFICANT CHANGE UP (ref 68–114)
ESTIMATED AVERAGE GLUCOSE: 114 MG/DL — SIGNIFICANT CHANGE UP (ref 68–114)
GLUCOSE SERPL-MCNC: 112 MG/DL — HIGH (ref 70–99)
GLUCOSE SERPL-MCNC: 112 MG/DL — HIGH (ref 70–99)
HCT VFR BLD CALC: 35.4 % — LOW (ref 39–50)
HCT VFR BLD CALC: 35.4 % — LOW (ref 39–50)
HDLC SERPL-MCNC: 40 MG/DL — LOW
HDLC SERPL-MCNC: 40 MG/DL — LOW
HGB BLD-MCNC: 11.1 G/DL — LOW (ref 13–17)
HGB BLD-MCNC: 11.1 G/DL — LOW (ref 13–17)
IMM GRANULOCYTES NFR BLD AUTO: 0.5 % — SIGNIFICANT CHANGE UP (ref 0–0.9)
IMM GRANULOCYTES NFR BLD AUTO: 0.5 % — SIGNIFICANT CHANGE UP (ref 0–0.9)
LIPID PNL WITH DIRECT LDL SERPL: 66 MG/DL — SIGNIFICANT CHANGE UP
LIPID PNL WITH DIRECT LDL SERPL: 66 MG/DL — SIGNIFICANT CHANGE UP
LYMPHOCYTES # BLD AUTO: 1.05 K/UL — SIGNIFICANT CHANGE UP (ref 1–3.3)
LYMPHOCYTES # BLD AUTO: 1.05 K/UL — SIGNIFICANT CHANGE UP (ref 1–3.3)
LYMPHOCYTES # BLD AUTO: 14.3 % — SIGNIFICANT CHANGE UP (ref 13–44)
LYMPHOCYTES # BLD AUTO: 14.3 % — SIGNIFICANT CHANGE UP (ref 13–44)
MCHC RBC-ENTMCNC: 28.2 PG — SIGNIFICANT CHANGE UP (ref 27–34)
MCHC RBC-ENTMCNC: 28.2 PG — SIGNIFICANT CHANGE UP (ref 27–34)
MCHC RBC-ENTMCNC: 31.4 GM/DL — LOW (ref 32–36)
MCHC RBC-ENTMCNC: 31.4 GM/DL — LOW (ref 32–36)
MCV RBC AUTO: 89.8 FL — SIGNIFICANT CHANGE UP (ref 80–100)
MCV RBC AUTO: 89.8 FL — SIGNIFICANT CHANGE UP (ref 80–100)
MONOCYTES # BLD AUTO: 0.54 K/UL — SIGNIFICANT CHANGE UP (ref 0–0.9)
MONOCYTES # BLD AUTO: 0.54 K/UL — SIGNIFICANT CHANGE UP (ref 0–0.9)
MONOCYTES NFR BLD AUTO: 7.4 % — SIGNIFICANT CHANGE UP (ref 2–14)
MONOCYTES NFR BLD AUTO: 7.4 % — SIGNIFICANT CHANGE UP (ref 2–14)
NEUTROPHILS # BLD AUTO: 5.5 K/UL — SIGNIFICANT CHANGE UP (ref 1.8–7.4)
NEUTROPHILS # BLD AUTO: 5.5 K/UL — SIGNIFICANT CHANGE UP (ref 1.8–7.4)
NEUTROPHILS NFR BLD AUTO: 74.9 % — SIGNIFICANT CHANGE UP (ref 43–77)
NEUTROPHILS NFR BLD AUTO: 74.9 % — SIGNIFICANT CHANGE UP (ref 43–77)
NON HDL CHOLESTEROL: 80 MG/DL — SIGNIFICANT CHANGE UP
NON HDL CHOLESTEROL: 80 MG/DL — SIGNIFICANT CHANGE UP
PLATELET # BLD AUTO: 175 K/UL — SIGNIFICANT CHANGE UP (ref 150–400)
PLATELET # BLD AUTO: 175 K/UL — SIGNIFICANT CHANGE UP (ref 150–400)
POTASSIUM SERPL-MCNC: 3.7 MMOL/L — SIGNIFICANT CHANGE UP (ref 3.5–5.3)
POTASSIUM SERPL-MCNC: 3.7 MMOL/L — SIGNIFICANT CHANGE UP (ref 3.5–5.3)
POTASSIUM SERPL-SCNC: 3.7 MMOL/L — SIGNIFICANT CHANGE UP (ref 3.5–5.3)
POTASSIUM SERPL-SCNC: 3.7 MMOL/L — SIGNIFICANT CHANGE UP (ref 3.5–5.3)
RBC # BLD: 3.94 M/UL — LOW (ref 4.2–5.8)
RBC # BLD: 3.94 M/UL — LOW (ref 4.2–5.8)
RBC # FLD: 16.3 % — HIGH (ref 10.3–14.5)
RBC # FLD: 16.3 % — HIGH (ref 10.3–14.5)
SODIUM SERPL-SCNC: 136 MMOL/L — SIGNIFICANT CHANGE UP (ref 135–145)
SODIUM SERPL-SCNC: 136 MMOL/L — SIGNIFICANT CHANGE UP (ref 135–145)
TRIGL SERPL-MCNC: 60 MG/DL — SIGNIFICANT CHANGE UP
TRIGL SERPL-MCNC: 60 MG/DL — SIGNIFICANT CHANGE UP
WBC # BLD: 7.34 K/UL — SIGNIFICANT CHANGE UP (ref 3.8–10.5)
WBC # BLD: 7.34 K/UL — SIGNIFICANT CHANGE UP (ref 3.8–10.5)
WBC # FLD AUTO: 7.34 K/UL — SIGNIFICANT CHANGE UP (ref 3.8–10.5)
WBC # FLD AUTO: 7.34 K/UL — SIGNIFICANT CHANGE UP (ref 3.8–10.5)

## 2023-11-21 PROCEDURE — 99223 1ST HOSP IP/OBS HIGH 75: CPT

## 2023-11-21 PROCEDURE — 99232 SBSQ HOSP IP/OBS MODERATE 35: CPT

## 2023-11-21 PROCEDURE — 99222 1ST HOSP IP/OBS MODERATE 55: CPT

## 2023-11-21 RX ORDER — ENOXAPARIN SODIUM 100 MG/ML
40 INJECTION SUBCUTANEOUS EVERY 24 HOURS
Refills: 0 | Status: DISCONTINUED | OUTPATIENT
Start: 2023-11-22 | End: 2023-11-29

## 2023-11-21 RX ORDER — ASPIRIN/CALCIUM CARB/MAGNESIUM 324 MG
81 TABLET ORAL DAILY
Refills: 0 | Status: DISCONTINUED | OUTPATIENT
Start: 2023-11-22 | End: 2023-11-29

## 2023-11-21 RX ORDER — BENZOCAINE AND MENTHOL 5; 1 G/100ML; G/100ML
1 LIQUID ORAL EVERY 6 HOURS
Refills: 0 | Status: DISCONTINUED | OUTPATIENT
Start: 2023-11-21 | End: 2023-11-29

## 2023-11-21 RX ADMIN — TIOTROPIUM BROMIDE 2 PUFF(S): 18 CAPSULE ORAL; RESPIRATORY (INHALATION) at 09:09

## 2023-11-21 RX ADMIN — Medication 25 MILLIGRAM(S): at 10:25

## 2023-11-21 RX ADMIN — Medication 1 MILLIGRAM(S): at 22:07

## 2023-11-21 RX ADMIN — Medication 1 MILLIGRAM(S): at 10:27

## 2023-11-21 RX ADMIN — GABAPENTIN 400 MILLIGRAM(S): 400 CAPSULE ORAL at 22:06

## 2023-11-21 RX ADMIN — ATORVASTATIN CALCIUM 80 MILLIGRAM(S): 80 TABLET, FILM COATED ORAL at 22:07

## 2023-11-21 RX ADMIN — PREGABALIN 1000 MICROGRAM(S): 225 CAPSULE ORAL at 10:26

## 2023-11-21 RX ADMIN — Medication 650 MILLIGRAM(S): at 22:06

## 2023-11-21 RX ADMIN — Medication 5 MILLIGRAM(S): at 22:07

## 2023-11-21 RX ADMIN — Medication 180 MILLIGRAM(S): at 10:26

## 2023-11-21 RX ADMIN — Medication 10 MILLIGRAM(S): at 10:25

## 2023-11-21 RX ADMIN — SPIRONOLACTONE 50 MILLIGRAM(S): 25 TABLET, FILM COATED ORAL at 10:24

## 2023-11-21 RX ADMIN — Medication 650 MILLIGRAM(S): at 13:19

## 2023-11-21 RX ADMIN — BENZOCAINE AND MENTHOL 1 LOZENGE: 5; 1 LIQUID ORAL at 04:35

## 2023-11-21 RX ADMIN — GABAPENTIN 400 MILLIGRAM(S): 400 CAPSULE ORAL at 06:06

## 2023-11-21 RX ADMIN — ALBUTEROL 2 PUFF(S): 90 AEROSOL, METERED ORAL at 09:09

## 2023-11-21 RX ADMIN — Medication 40 MILLIGRAM(S): at 06:08

## 2023-11-21 RX ADMIN — BUDESONIDE AND FORMOTEROL FUMARATE DIHYDRATE 2 PUFF(S): 160; 4.5 AEROSOL RESPIRATORY (INHALATION) at 09:09

## 2023-11-21 RX ADMIN — BUDESONIDE AND FORMOTEROL FUMARATE DIHYDRATE 2 PUFF(S): 160; 4.5 AEROSOL RESPIRATORY (INHALATION) at 19:51

## 2023-11-21 RX ADMIN — Medication 40 MILLIGRAM(S): at 13:20

## 2023-11-21 RX ADMIN — ENOXAPARIN SODIUM 90 MILLIGRAM(S): 100 INJECTION SUBCUTANEOUS at 10:27

## 2023-11-21 RX ADMIN — Medication 10 MILLIGRAM(S): at 22:07

## 2023-11-21 RX ADMIN — QUETIAPINE FUMARATE 100 MILLIGRAM(S): 200 TABLET, FILM COATED ORAL at 22:07

## 2023-11-21 RX ADMIN — Medication 650 MILLIGRAM(S): at 13:52

## 2023-11-21 RX ADMIN — Medication 650 MILLIGRAM(S): at 00:06

## 2023-11-21 RX ADMIN — Medication 25 MILLIGRAM(S): at 22:07

## 2023-11-21 RX ADMIN — GABAPENTIN 400 MILLIGRAM(S): 400 CAPSULE ORAL at 13:19

## 2023-11-21 RX ADMIN — PANTOPRAZOLE SODIUM 40 MILLIGRAM(S): 20 TABLET, DELAYED RELEASE ORAL at 06:06

## 2023-11-21 RX ADMIN — Medication 1 TABLET(S): at 10:26

## 2023-11-21 RX ADMIN — CLOPIDOGREL BISULFATE 75 MILLIGRAM(S): 75 TABLET, FILM COATED ORAL at 10:26

## 2023-11-21 RX ADMIN — Medication 100 MILLIGRAM(S): at 10:25

## 2023-11-21 NOTE — PHYSICAL THERAPY INITIAL EVALUATION ADULT - PERTINENT HX OF CURRENT PROBLEM, REHAB EVAL
Pt admitted to  secondary to dizziness. Pt s/p Eleazar 11/20/2023. MRI head: acute CVA-tiny acute infarct right frontal precentral gyrus. CTA head/neck: mod stenosis 60% narrowing right ICA origin.

## 2023-11-21 NOTE — PHYSICAL THERAPY INITIAL EVALUATION ADULT - GENERAL OBSERVATIONS, REHAB EVAL
Pt found sitting OOB in chair with tele monitor. Attempted PT x 3 today. Pt with no c/o pain. Noted redness right lower leg.

## 2023-11-21 NOTE — PHYSICAL THERAPY INITIAL EVALUATION ADULT - PLANNED THERAPY INTERVENTIONS, PT EVAL
Increase mobility when possible. Eval. Pt left in bed with all observation section equipment/material intact. Nursing informed of pt OOB in chair with no chair alarm. Pt instructed to not get up on his own and to utilize CB if he needs to get OOC. Pt with no c/o pain. Will cont to follow pt and increase as tolerated./bed mobility training/gait training/transfer training

## 2023-11-21 NOTE — PROGRESS NOTE ADULT - NS ATTEND AMEND GEN_ALL_CORE FT
discussed with patient , reviewed  chart , patient isolated infarct ,  has very small jose device leak less likely cause of CVA , continue statin , antiplatelet agents   statin ,  no chronic AC,  change IV lasix PO from  tomorrow , daily weight discussed with patient , reviewed  chart , patient isolated infarct ,  has very small jose device leak less likely cause of CVA , continue statin , antiplatelet agents   statin ,  no chronic AC,  change IV lasix PO from  tomorrow , daily weight  discussed with eloina baig , dr oconnor ,

## 2023-11-21 NOTE — PHYSICAL THERAPY INITIAL EVALUATION ADULT - ADDITIONAL COMMENTS
pt has R LE prosthesis. Info obtained from eval 7/2023. pt has L LE prosthesis. Info obtained from eval 7/2023. Update 11/21/2023: Pt utilizes 5 L/min NC at [facility].

## 2023-11-21 NOTE — PROGRESS NOTE ADULT - ASSESSMENT
MR. Boone is a 69 y/o Male with a PMHx of anemia, Afib, CHF, COPD, cirrhosis, collapsed lung, emphysema  EtOH abuse, EtOH withdrawal, falls, GERD, HTN, meningitis, presence of Watchman left atrial appendage closure device presented with persistent dizziness/ vertigo.    His neurological examination is non-focal.    Dizziness:  Differential diagnosis includes benign peripheral vertigo vs stroke.  -MRI brain does show a tiny acute infarct in the right frontal precentral gyrus, not clearly the cause of his presenting symptom of dizziness  -Meclizine prn    Stroke:  - tiny acute infarct in the right frontal precentral gyrus noted on MRI  - 60% stenosis of right ICA seen on CTA, but carotid ultrasound shows no significant stenosis.  -Was on aspirin at home. Correction to prior notes: He does not think that he was on Plavix at home.  -Has persistent afib, s/p Watchman several years ago. MYA does not show any jose-device thrombus or significant leakage.  -Would continue aspirin + Plavix   -Continue statin  -Outpatient sleep study for possible VIJAY    Discussed with Dr. Ly.  Will follow-up if additional input is needed from neurology service.

## 2023-11-21 NOTE — PROGRESS NOTE ADULT - SUBJECTIVE AND OBJECTIVE BOX
HPI:  67 y/o Male with a PMHx of anemia, Afib, CHF, COPD, cirrhosis, collapsed lung, emphysema  EtOH abuse, EtOH withdrawal, falls, GERD, HTN, meningitis, presence of Watchman left atrial appendage closure device presented with persistent dizziness/ vertigo. Patient reports he has had similar issues. Symptoms started suddenly while at home yesterday while walking, patient sat himself down, denies falls or injuries.  Reported constant dizziness and spinning sensation associated with nausea, no vomiting.  Also reports mild headache and dry mouth. No chest pain. No SOB. No recent sickness. No recent travel.  (18 Nov 2023 06:20)    Admitted for CVA. MRI showed "tiny acute infarct right frontal precentral gyrus"  Pt has h/o permanent afib w/ watchman device implanted 8 yrs ago at hospital in [place].  Reports some LE edema today worse since admission no dyspnea.    11/20/23: NPO for MYA today. No complaints this morning   11/21/23: Pt denies cp or sob.  Occasional dizziness.  Tele: Afib 60's-90's bradys down into 30's during sleeping hours    PAST MEDICAL AND SURGICAL HISTORY:  PAST MEDICAL & SURGICAL HISTORY:  Chronic atrial fibrillation      Alcohol abuse      Poor historian      CHF (congestive heart failure)      Cirrhosis      Emphysema of lung      Alcohol withdrawal      Collapsed lung      Meningitis      Falls      Anemia      Chronic obstructive pulmonary disease (COPD)      GERD (gastroesophageal reflux disease)      Hypertension      Presence of Watchman left atrial appendage closure device      Atrial fibrillation      COPD without exacerbation      Chronic CHF      S/P BKA (below knee amputation) unilateral, left      Presence of Watchman left atrial appendage closure device      Unilateral amputation of lower extremity below knee      H/O tracheostomy  now removed      S/P percutaneous endoscopic gastrostomy (PEG) tube placement  now removed          ALLERGIES:  Allergies    Duricef (Rash)  Ceclor (Rash)    Intolerances        SOCIAL HISTORY:  Social History:  Non smoker.  +Drinks alcohol   No other illicit drug abuse. (18 Nov 2023 06:20)      FAMILY  HISTORY:  FAMILY HISTORY:  No pertinent family history in first degree relatives        MEDICATIONS:  OUTPATIENT:  Home Medications:  albuterol 90 mcg/inh inhalation aerosol: 2 puff(s) inhaled every 4 hours, As Needed for wheezing (18 Nov 2023 05:46)  Aspirin Enteric Coated 81 mg oral delayed release tablet: 1 tab(s) orally once a day (18 Nov 2023 05:46)  budesonide-formoterol 160 mcg-4.5 mcg/inh inhalation aerosol: 2 puff(s) inhaled 2 times a day (18 Nov 2023 05:46)  busPIRone 10 mg oral tablet: 1 tab(s) orally 2 times a day (18 Nov 2023 05:46)  cholecalciferol 25 mcg (1000 intl units) oral tablet: 1 tab(s) orally once a day (18 Nov 2023 05:46)  Colace 100 mg oral capsule: 2 cap(s) orally once a day (18 Nov 2023 05:46)  cyanocobalamin 1000 mcg oral tablet: 1 tab(s) orally once a day (18 Nov 2023 05:46)  doxazosin 1 mg oral tablet: 1 tab(s) orally once a day (at bedtime) (18 Nov 2023 05:46)  Farxiga 10 mg oral tablet: 1 tab(s) orally once a day (18 Nov 2023 05:46)  folic acid 1 mg oral tablet: 1 tab(s) orally once a day (18 Nov 2023 05:46)  furosemide 20 mg oral tablet: 2 tab(s) orally once a day (18 Nov 2023 05:46)  gabapentin 400 mg oral capsule: 1 cap(s) orally 3 times a day (18 Nov 2023 05:46)  Metoprolol Tartrate 25 mg oral tablet: 1 tab(s) orally 2 times a day (18 Nov 2023 05:46)  omeprazole 40 mg oral delayed release capsule: 1 cap(s) orally once a day (18 Nov 2023 05:46)  psyllium 500 mg oral capsule: 2 cap(s) orally once a day (18 Nov 2023 05:46)  QUEtiapine 50 mg oral tablet: 2 tab(s) orally once a day (at bedtime) (18 Nov 2023 05:46)  spironolactone 25 mg oral tablet: 2 tab(s) orally once a day (18 Nov 2023 05:46)  thiamine 100 mg oral tablet: 1 tab(s) orally once a day (18 Nov 2023 05:46)      MEDICATIONS  (STANDING):  atorvastatin 80 milliGRAM(s) Oral at bedtime  budesonide 160 MICROgram(s)/formoterol 4.5 MICROgram(s) Inhaler 2 Puff(s) Inhalation two times a day  busPIRone 10 milliGRAM(s) Oral two times a day  clopidogrel Tablet 75 milliGRAM(s) Oral daily  cyanocobalamin 1000 MICROGram(s) Oral daily  diltiazem    milliGRAM(s) Oral daily  doxazosin 1 milliGRAM(s) Oral at bedtime  enoxaparin Injectable 90 milliGRAM(s) SubCutaneous every 12 hours  folic acid 1 milliGRAM(s) Oral daily  furosemide   Injectable 40 milliGRAM(s) IV Push two times a day  gabapentin 400 milliGRAM(s) Oral three times a day  influenza  Vaccine (HIGH DOSE) 0.7 milliLiter(s) IntraMuscular once  metoprolol tartrate 25 milliGRAM(s) Oral two times a day  multivitamin 1 Tablet(s) Oral daily  pantoprazole    Tablet 40 milliGRAM(s) Oral before breakfast  psyllium Powder 2 Packet(s) Oral daily  QUEtiapine 100 milliGRAM(s) Oral at bedtime  spironolactone 50 milliGRAM(s) Oral daily  thiamine 100 milliGRAM(s) Oral daily  tiotropium 2.5 MICROgram(s) Inhaler 2 Puff(s) Inhalation daily  zaleplon 5 milliGRAM(s) Oral at bedtime    MEDICATIONS  (PRN):  acetaminophen     Tablet .. 650 milliGRAM(s) Oral every 6 hours PRN Mild Pain (1 - 3)  albuterol    90 MICROgram(s) HFA Inhaler 2 Puff(s) Inhalation every 6 hours PRN Shortness of Breath and/or Wheezing  benzocaine/menthol Lozenge 1 Lozenge Oral every 6 hours PRN Sore Throat  meclizine 25 milliGRAM(s) Oral every 8 hours PRN Dizziness  ondansetron Injectable 4 milliGRAM(s) IV Push every 6 hours PRN Nausea and/or Vomiting    Vital Signs Last 24 Hrs  T(C): 36.3 (21 Nov 2023 07:55), Max: 36.8 (20 Nov 2023 20:50)  T(F): 97.3 (21 Nov 2023 07:55), Max: 98.2 (20 Nov 2023 20:50)  HR: 89 (21 Nov 2023 07:55) (66 - 89)  BP: 121/70 (21 Nov 2023 07:55) (106/47 - 128/86)  BP(mean): --  RR: 18 (21 Nov 2023 07:55) (16 - 19)  SpO2: 100% (21 Nov 2023 07:55) (95% - 100%)    Parameters below as of 21 Nov 2023 07:55  Patient On (Oxygen Delivery Method): nasal cannula  O2 Flow (L/min): 4    I&O's Summary    19 Nov 2023 07:01  -  19 Nov 2023 19:05  --------------------------------------------------------  IN: 0 mL / OUT: 1300 mL / NET: -1300 mL        I&O's Detail    19 Nov 2023 07:01  -  19 Nov 2023 19:05  --------------------------------------------------------  IN:  Total IN: 0 mL    OUT:    Voided (mL): 1300 mL  Total OUT: 1300 mL    Total NET: -1300 mL          PHYSICAL EXAM:    Constitutional: NAD, awake   HEENT: PERR, EOMI  Neck:  supple,  No JVD  Respiratory: Breath sounds are clear bilaterally, No wheezing, rales or rhonchi  Cardiovascular: S1 and S2, irregularly irregular. no Murmurs, gallops or rubs  Gastrointestinal: Bowel Sounds present, soft, nontender.   Extremities: s/p left BKA. mild edema right LE   Neurological: A/O x 3, no focal deficits  Musculoskeletal: no calf tenderness.  Skin: No rashes.    ===============================  ===============================  LABS:                           11.1   7.34  )-----------( 175      ( 21 Nov 2023 06:40 )             35.4                           10.8   8.37  )-----------( 171      ( 17 Nov 2023 23:42 )             33.6     11-21    136  |  99  |  14  ----------------------------<  112<H>  3.7   |  32<H>  |  0.88    Ca    8.8      21 Nov 2023 06:40        17 Nov 2023 23:42    136    |  101    |  11     ----------------------------<  136    4.2     |  29     |  0.77     Ca    8.4        17 Nov 2023 23:42    TPro  7.0    /  Alb  2.7    /  TBili  0.4    /  DBili  x      /  AST  24     /  ALT  13     /  AlkPhos  84     17 Nov 2023 23:42    PT/INR - ( 18 Nov 2023 01:49 )   PT: 12.2 sec;   INR: 1.08 ratio         PTT - ( 18 Nov 2023 01:49 )  PTT:31.9 sec    THYROID STUDIES:    Thyroid Stimulating Hormone, Serum: 0.79 uU/mL (07.14.23 @ 06:47) ===============================  ===============================  CARDIAC BIOMARKERS:  -------  -BNP VALUES:  11-13 @ 06:45  Pro Bnp 3031  11-09 @ 09:24  Pro Bnp 4176    -------  -TROPONIN VALUES:   Troponin I, High Sensitivity Result: 7.86 ng/L (08-30-23 @ 07:31)  Troponin I, High Sensitivity Result: 5.72 ng/L (07-09-23 @ 07:07)  Troponin I, High Sensitivity Result: 7.54 ng/L (07-08-23 @ 09:51)  Troponin I, High Sensitivity Result: 8.42 ng/L (05-28-23 @ 06:51)  Troponin I, High Sensitivity Result: 7.18 ng/L (05-27-23 @ 09:22)  Troponin I, High Sensitivity Result: 7.58 ng/L (05-27-23 @ 01:23)  Troponin I, High Sensitivity Result: 15.54 ng/L (04-22-23 @ 04:05)  Troponin I, High Sensitivity Result: 15.75 ng/L (04-22-23 @ 00:41)  Troponin I, High Sensitivity Result: 14.31 ng/L (11-09-21 @ 12:13)  Troponin I, High Sensitivity Result: 19.27 ng/L (11-09-21 @ 09:24)      ===============================  ===============================  EKG: afib rate controlled.        < from: MYA Complete w/Spect and Color (11.20.23 @ 15:07) >   Impression     Summary     A transesophageal echocardiogram was performed and included probe   placement in the esophagus posterior to the heart by the interpreting   physician, real-time image acquisition and interpretation utilizing 2-D,   color flow Doppler, pulsed wave and/orcontinuous wave with spectral   display. The patient received IV sedation. The procedure was monitored   with automatic blood pressure monitoring, telemetry tracings, and pulse   oximetry. The patient tolerated the procedure well and there were no   complications.     Normal left ventricular size and systolic function. Estimated LVEF is   60-65%.   Left atrial occlusion device present with small jose-device leak. No   evidence of jose-device thrombus.   Moderate mitral and tricuspid regurgitation.    < end of copied text >

## 2023-11-21 NOTE — PROGRESS NOTE ADULT - SUBJECTIVE AND OBJECTIVE BOX
Subjective: overall feels better, more awake and alert sitting in chair , cough improved , sob improving ,       REVIEW OF SYSTEMS:    CONSTITUTIONAL: No weakness, fevers or chills  EYES/ENT: No visual changes;  No vertigo or throat pain   NECK: No pain or stiffness  RESPIRATORY: No cough, wheezing, hemoptysis; No shortness of breath  CARDIOVASCULAR: No chest pain or palpitations  GASTROINTESTINAL: No abdominal or epigastric pain. No nausea, vomiting, or hematemesis; No diarrhea or constipation. No melena or hematochezia.  GENITOURINARY: No dysuria, frequency or hematuria  NEUROLOGICAL: No numbness or weakness  SKIN: No itching, burning, rashes, or lesions   All other review of systems is negative unless indicated above  PHYSICAL EXAM:    Daily     Daily Weight in k.4 (2023 06:37)    ICU Vital Signs Last 24 Hrs  T(C): 36.3 (2023 07:55), Max: 36.8 (2023 20:50)  T(F): 97.3 (2023 07:55), Max: 98.2 (2023 20:50)  HR: 89 (2023 07:55) (66 - 89)  BP: 121/70 (2023 07:55) (106/47 - 121/70)  BP(mean): --  ABP: --  ABP(mean): --  RR: 18 (2023 07:55) (16 - 19)  SpO2: 100% (2023 07:55) (97% - 100%)    O2 Parameters below as of 2023 07:55  Patient On (Oxygen Delivery Method): nasal cannula  O2 Flow (L/min): 4    Physical exam   HEENT:   pupils equal and reactive, EOMI, no oropharyngeal lesions, erythema, exudates, oral thrush    NECK:   supple, no carotid bruits, no palpable lymph nodes, no thyromegaly    CV:  +S1, +S2, regular, no murmurs or rubs    RESP:  crackles and rhonchi b/l lung feilds improving air entry    BREAST:  not examined    GI:  abdomen soft, non-tender, non-distended, normal BS, no bruits,     RECTAL:  not examined  :  not examined    MSK:   normal muscle tone, no atrophy, no rigidity, no contractions    EXT:   no clubbing, no cyanosis, no edema, no calf pain, swelling or erythema    VASCULAR:  pulses equal and symmetric in the upper and lower extremities    NEURO:  AAOX3, no focal neurological deficits, follows all commands, able to move extremities spontaneously    SKIN:  no ulcers, lesions or rashes                                    11.1   7.34  )-----------( 175      ( 2023 06:40 )             35.4       CBC Full  -  ( 2023 06:40 )  WBC Count : 7.34 K/uL  RBC Count : 3.94 M/uL  Hemoglobin : 11.1 g/dL  Hematocrit : 35.4 %  Platelet Count - Automated : 175 K/uL  Mean Cell Volume : 89.8 fl  Mean Cell Hemoglobin : 28.2 pg  Mean Cell Hemoglobin Concentration : 31.4 gm/dL  Auto Neutrophil # : 5.50 K/uL  Auto Lymphocyte # : 1.05 K/uL  Auto Monocyte # : 0.54 K/uL  Auto Eosinophil # : 0.18 K/uL  Auto Basophil # : 0.03 K/uL  Auto Neutrophil % : 74.9 %  Auto Lymphocyte % : 14.3 %  Auto Monocyte % : 7.4 %  Auto Eosinophil % : 2.5 %  Auto Basophil % : 0.4 %      11    136  |  99  |  14  ----------------------------<  112<H>  3.7   |  32<H>  |  0.88    Ca    8.8      2023 06:40                      Urinalysis Basic - ( 2023 06:40 )    Color: x / Appearance: x / SG: x / pH: x  Gluc: 112 mg/dL / Ketone: x  / Bili: x / Urobili: x   Blood: x / Protein: x / Nitrite: x   Leuk Esterase: x / RBC: x / WBC x   Sq Epi: x / Non Sq Epi: x / Bacteria: x            MEDICATIONS  (STANDING):  atorvastatin 80 milliGRAM(s) Oral at bedtime  budesonide 160 MICROgram(s)/formoterol 4.5 MICROgram(s) Inhaler 2 Puff(s) Inhalation two times a day  busPIRone 10 milliGRAM(s) Oral two times a day  clopidogrel Tablet 75 milliGRAM(s) Oral daily  cyanocobalamin 1000 MICROGram(s) Oral daily  diltiazem    milliGRAM(s) Oral daily  doxazosin 1 milliGRAM(s) Oral at bedtime  folic acid 1 milliGRAM(s) Oral daily  furosemide   Injectable 40 milliGRAM(s) IV Push two times a day  gabapentin 400 milliGRAM(s) Oral three times a day  influenza  Vaccine (HIGH DOSE) 0.7 milliLiter(s) IntraMuscular once  metoprolol tartrate 25 milliGRAM(s) Oral two times a day  multivitamin 1 Tablet(s) Oral daily  pantoprazole    Tablet 40 milliGRAM(s) Oral before breakfast  psyllium Powder 2 Packet(s) Oral daily  QUEtiapine 100 milliGRAM(s) Oral at bedtime  spironolactone 50 milliGRAM(s) Oral daily  thiamine 100 milliGRAM(s) Oral daily  tiotropium 2.5 MICROgram(s) Inhaler 2 Puff(s) Inhalation daily  zaleplon 5 milliGRAM(s) Oral at bedtime

## 2023-11-21 NOTE — CONSULT NOTE ADULT - ASSESSMENT
- cont O2  - s/p MYA foe afib  - has normal EF w moderate pulmonary htn  - can cont MDI's  - cxr shows small left effussion w pulmonary congestion; cont diuresis  - has refused PSG in past  - cont lovenox-  - has cva in setting of afib w watchmen's procedure

## 2023-11-21 NOTE — PHYSICAL THERAPY INITIAL EVALUATION ADULT - ACTIVE RANGE OF MOTION EXAMINATION, REHAB EVAL
Left LE hip flex WNL, left knee ext/flex ~0-~80 degrees./Left UE Active ROM was WFL (within functional limits)/Right UE Active ROM was WFL (within functional limits)/bilateral upper extremity Active ROM was WFL (within functional limits)/Left LE Active ROM was WFL (within functional limits)

## 2023-11-21 NOTE — CONSULT NOTE ADULT - SUBJECTIVE AND OBJECTIVE BOX
HPI:  69 y/o Male with a PMHx of anemia, Afib, CHF, COPD, cirrhosis, collapsed lung, emphysema  EtOH abuse, EtOH withdrawal, falls, GERD, HTN, meningitis, presence of Watchman left atrial appendage closure device presented with persistent dizziness/ vertigo. Patient reports he has had similar issues. Symptoms started suddenly while at home yesterday while walking, patient sat himself down, denies falls or injuries.  Reported constant dizziness and spinning sensation associated with nausea, no vomiting.  Also reports mild headache and dry mouth. No chest pain. No SOB. No recent sickness. No recent travel.  (18 Nov 2023 06:20)    11/21: History as above. Has small, right CVA.      PAST MEDICAL & SURGICAL HISTORY:  Chronic atrial fibrillation      Alcohol abuse      Poor historian      CHF (congestive heart failure)      Cirrhosis      Emphysema of lung      Alcohol withdrawal      Collapsed lung      Meningitis      Falls      Anemia      Chronic obstructive pulmonary disease (COPD)      GERD (gastroesophageal reflux disease)      Hypertension      Presence of Watchman left atrial appendage closure device      Atrial fibrillation      COPD without exacerbation      Chronic CHF      S/P BKA (below knee amputation) unilateral, left      Presence of Watchman left atrial appendage closure device      Unilateral amputation of lower extremity below knee      H/O tracheostomy  now removed      S/P percutaneous endoscopic gastrostomy (PEG) tube placement  now removed          MEDICATIONS  (STANDING):  atorvastatin 80 milliGRAM(s) Oral at bedtime  budesonide 160 MICROgram(s)/formoterol 4.5 MICROgram(s) Inhaler 2 Puff(s) Inhalation two times a day  busPIRone 10 milliGRAM(s) Oral two times a day  clopidogrel Tablet 75 milliGRAM(s) Oral daily  cyanocobalamin 1000 MICROGram(s) Oral daily  diltiazem    milliGRAM(s) Oral daily  doxazosin 1 milliGRAM(s) Oral at bedtime  enoxaparin Injectable 90 milliGRAM(s) SubCutaneous every 12 hours  folic acid 1 milliGRAM(s) Oral daily  furosemide   Injectable 40 milliGRAM(s) IV Push two times a day  gabapentin 400 milliGRAM(s) Oral three times a day  influenza  Vaccine (HIGH DOSE) 0.7 milliLiter(s) IntraMuscular once  metoprolol tartrate 25 milliGRAM(s) Oral two times a day  multivitamin 1 Tablet(s) Oral daily  pantoprazole    Tablet 40 milliGRAM(s) Oral before breakfast  psyllium Powder 2 Packet(s) Oral daily  QUEtiapine 100 milliGRAM(s) Oral at bedtime  spironolactone 50 milliGRAM(s) Oral daily  thiamine 100 milliGRAM(s) Oral daily  tiotropium 2.5 MICROgram(s) Inhaler 2 Puff(s) Inhalation daily  zaleplon 5 milliGRAM(s) Oral at bedtime    MEDICATIONS  (PRN):  acetaminophen     Tablet .. 650 milliGRAM(s) Oral every 6 hours PRN Mild Pain (1 - 3)  albuterol    90 MICROgram(s) HFA Inhaler 2 Puff(s) Inhalation every 6 hours PRN Shortness of Breath and/or Wheezing  benzocaine/menthol Lozenge 1 Lozenge Oral every 6 hours PRN Sore Throat  meclizine 25 milliGRAM(s) Oral every 8 hours PRN Dizziness  ondansetron Injectable 4 milliGRAM(s) IV Push every 6 hours PRN Nausea and/or Vomiting      Allergies    Duricef (Rash)  Ceclor (Rash)    Intolerances        SOCIAL HISTORY: Denies tobacco, etoh abuse or illicit drug use    FAMILY HISTORY:  No pertinent family history in first degree relatives        Vital Signs Last 24 Hrs  T(C): 36.6 (21 Nov 2023 06:02), Max: 36.8 (20 Nov 2023 20:50)  T(F): 97.9 (21 Nov 2023 06:02), Max: 98.2 (20 Nov 2023 20:50)  HR: 77 (21 Nov 2023 06:02) (66 - 85)  BP: 113/68 (21 Nov 2023 06:02) (106/47 - 128/86)  BP(mean): --  RR: 19 (21 Nov 2023 06:02) (16 - 19)  SpO2: 97% (21 Nov 2023 06:02) (95% - 99%)    Parameters below as of 21 Nov 2023 06:02  Patient On (Oxygen Delivery Method): nasal cannula  O2 Flow (L/min): 4      REVIEW OF SYSTEMS:    CONSTITUTIONAL:  As per HPI.  SKIN: no rashes  HEENT:  Eyes:  No diplopia or blurred vision. ENT:  No earache, sore throat or runny nose.  CARDIOVASCULAR:  No pressure, squeezing, tightness, heaviness or aching about the chest, neck, axilla or epigastrium.  RESPIRATORY:  No cough, shortness of breath, PND or orthopnea.  GASTROINTESTINAL:  No nausea, vomiting or diarrhea.  GENITOURINARY:  No dysuria, frequency or urgency.  MUSCULOSKELETAL:  As per HPI.  SKIN:  No change in skin, hair or nails.  NEUROLOGIC:  dizziness  PSYCHIATRIC:  No disorder of thought or mood.  ENDOCRINE:  No heat or cold intolerance, polyuria or polydipsia.  HEMATOLOGICAL:  No easy bruising or bleedings:  .     PHYSICAL EXAMINATION:    GENERAL APPEARANCE:  Pt. is not currently dyspneic, in no distress. Pt. is alert, oriented, and pleasant.  HEENT:  Pupils are normal and react normally. No icterus. Mucous membranes well colored.  NECK:  Supple. No lymphadenopathy. Jugular venous pressure not elevated. Carotids equal.   HEART:   S1S2 reg  CHEST:  decrease bs left 1/4 and right basilar crackles  ABDOMEN:  Soft and nontender.   EXTREMITIES:  left bka; right leg edema  SKIN:  No rash or significant lesions are noted.    LABS:                        11.1   7.34  )-----------( 175      ( 21 Nov 2023 06:40 )             35.4     11-21    136  |  99  |  14  ----------------------------<  112<H>  3.7   |  32<H>  |  0.88    Ca    8.8      21 Nov 2023 06:40              Urinalysis Basic - ( 21 Nov 2023 06:40 )    Color: x / Appearance: x / SG: x / pH: x  Gluc: 112 mg/dL / Ketone: x  / Bili: x / Urobili: x   Blood: x / Protein: x / Nitrite: x   Leuk Esterase: x / RBC: x / WBC x   Sq Epi: x / Non Sq Epi: x / Bacteria: x          RADIOLOGY & ADDITIONAL STUDIES:

## 2023-11-21 NOTE — PROGRESS NOTE ADULT - ASSESSMENT
67 y/o Male with a PMHx of anemia, Afib, CHF, COPD, cirrhosis, collapsed lung, emphysema  EtOH abuse, EtOH withdrawal, falls, GERD, HTN, meningitis, presence of Watchman left atrial appendage closure device presented with persistent dizziness/ vertigo. Patient reports he has had similar issues. Symptoms started suddenly while at home yesterday while walking, patient sat himself down, denies falls or injuries.  Reported constant dizziness and spinning sensation associated with nausea, no vomiting.  Also reports mild headache and dry mouth. No chest pain. No SOB. No recent sickness. No recent travel.     1.  Persistent Dizziness  Persistent Vertigo  -MRI- Acute CVA- Tiny acute infarct right frontal precentral gyrus.  . s/p MYA 11/20/23 Normal LVF, EF 60-65%. Left atrial occlusion device present with very small jose-device leak. No   evidence of jose-device thrombus, Moderate MR/TR.  very small  jose device leak - not likely to cause CVA per cardio and neuro- dw with Dr mcrae  . continue aspirin + Plavix   -Continue statin  -Outpatient sleep study for possible VIJAY-Neuro consult appreciated   Echo- left atrium severely dilated , pulm HTN, EF 60%   -cardio  and neuro input appreciated-  no indication for AC as pt has watchman's device ,  , stopped lovenox and started DAPT     2-persistent  afib,   has watchmans device - no AC    metoprolol, diltiazem for rate control.      3-right ICA  stenosis  -CTA shows 60% stenosis  -u/s shows no sig stenosis of either ICA  -vascular consult appreciated      2.  Acute on Chronic Diastolic CHF  patient with acute crackles/ rhonchi today;on  IV lasix 40 BID  monitor Cr    3.  COPD  -stable  -continue Albuterol, Symbicort and Spiriva    4.  lovenox SC for  for DVT ppx    5.  Code status: Full code    dw daughter    dispo- discharge planning once able to switch IV lasix to po, cardio following    67 y/o Male with a PMHx of anemia, Afib, CHF, COPD, cirrhosis, collapsed lung, emphysema  EtOH abuse, EtOH withdrawal, falls, GERD, HTN, meningitis, presence of Watchman left atrial appendage closure device presented with persistent dizziness/ vertigo. Patient reports he has had similar issues. Symptoms started suddenly while at home yesterday while walking, patient sat himself down, denies falls or injuries.  Reported constant dizziness and spinning sensation associated with nausea, no vomiting.  Also reports mild headache and dry mouth. No chest pain. No SOB. No recent sickness. No recent travel.     1.  Persistent Dizziness  Persistent Vertigo  -MRI- Acute CVA- Tiny acute infarct right frontal precentral gyrus.  . s/p MYA 11/20/23 Normal LVF, EF 60-65%. Left atrial occlusion device present with very small jose-device leak. No   evidence of jose-device thrombus, Moderate MR/TR.  very small  jose device leak - not likely to cause CVA per cardio and neuro- dw with Dr mcrae  . continue aspirin + Plavix   -Continue statin  -Outpatient sleep study for possible VIJAY-Neuro consult appreciated   Echo- left atrium severely dilated , pulm HTN, EF 60%   -cardio  and neuro input appreciated-  no indication for AC as pt has watchman's device ,  , stopped lovenox and started DAPT     2-persistent  afib,   has watchmans device - no AC    metoprolol, diltiazem for rate control.      3-right ICA  stenosis  -CTA shows 60% stenosis  -u/s shows no sig stenosis of either ICA  -vascular consult appreciated      2.  Acute on Chronic Diastolic CHF  patient with acute crackles/ rhonchi today;on  IV lasix 40 BID  monitor Cr    3.  COPD  -stable  -continue Albuterol, Symbicort and Spiriva    4.  lovenox SC for  for DVT ppx    5.  Code status: Full code    dw daughter    dispo- discharge planning once able to switch IV lasix to po, cardio following   dw with daughter Litzy 11/20 and 11/21 and updated her, she would like to be called daily

## 2023-11-21 NOTE — PHYSICAL THERAPY INITIAL EVALUATION ADULT - LEVEL OF INDEPENDENCE: SIT/STAND, REHAB EVAL
Attempted PT eval x 3 today. Pt did agree to chair eval, but did not want to attempt amb at this time. Will attempt to increase mobility on 11/22/2023 if possible.

## 2023-11-21 NOTE — PROGRESS NOTE ADULT - SUBJECTIVE AND OBJECTIVE BOX
Interval History:  11/21/23: Dizziness is improved overall    MEDICATIONS  (STANDING):  atorvastatin 80 milliGRAM(s) Oral at bedtime  budesonide 160 MICROgram(s)/formoterol 4.5 MICROgram(s) Inhaler 2 Puff(s) Inhalation two times a day  busPIRone 10 milliGRAM(s) Oral two times a day  clopidogrel Tablet 75 milliGRAM(s) Oral daily  cyanocobalamin 1000 MICROGram(s) Oral daily  diltiazem    milliGRAM(s) Oral daily  doxazosin 1 milliGRAM(s) Oral at bedtime  enoxaparin Injectable 90 milliGRAM(s) SubCutaneous every 12 hours  folic acid 1 milliGRAM(s) Oral daily  furosemide   Injectable 40 milliGRAM(s) IV Push two times a day  gabapentin 400 milliGRAM(s) Oral three times a day  influenza  Vaccine (HIGH DOSE) 0.7 milliLiter(s) IntraMuscular once  metoprolol tartrate 25 milliGRAM(s) Oral two times a day  multivitamin 1 Tablet(s) Oral daily  pantoprazole    Tablet 40 milliGRAM(s) Oral before breakfast  psyllium Powder 2 Packet(s) Oral daily  QUEtiapine 100 milliGRAM(s) Oral at bedtime  spironolactone 50 milliGRAM(s) Oral daily  thiamine 100 milliGRAM(s) Oral daily  tiotropium 2.5 MICROgram(s) Inhaler 2 Puff(s) Inhalation daily  zaleplon 5 milliGRAM(s) Oral at bedtime    MEDICATIONS  (PRN):  acetaminophen     Tablet .. 650 milliGRAM(s) Oral every 6 hours PRN Mild Pain (1 - 3)  albuterol    90 MICROgram(s) HFA Inhaler 2 Puff(s) Inhalation every 6 hours PRN Shortness of Breath and/or Wheezing  benzocaine/menthol Lozenge 1 Lozenge Oral every 6 hours PRN Sore Throat  meclizine 25 milliGRAM(s) Oral every 8 hours PRN Dizziness  ondansetron Injectable 4 milliGRAM(s) IV Push every 6 hours PRN Nausea and/or Vomiting      Allergies    Duricef (Rash)  Ceclor (Rash)    Intolerances        PHYSICAL EXAM:  Vital Signs Last 24 Hrs  T(F): 97.3 (11-21-23 @ 07:55)  HR: 89 (11-21-23 @ 07:55)  BP: 121/70 (11-21-23 @ 07:55)  RR: 18 (11-21-23 @ 07:55)    GENERAL: NAD  HEAD:  Atraumatic, Normocephalic  Neuro:  Awake, alert, no aphasia  CN: PERRL, EOMI, no nystagmus, no facial weakness, tongue protrudes in the midline  motor: normal tone, no pronator drift, full strength in upper extremities, right lower extremity, s/p left BKA  sensory: intact to light touch  coordination: finger to nose intact bilaterally  gait: not tested    LABS:                        11.1   7.34  )-----------( 175      ( 21 Nov 2023 06:40 )             35.4     11-21    136  |  99  |  14  ----------------------------<  112<H>  3.7   |  32<H>  |  0.88    Ca    8.8      21 Nov 2023 06:40        Urinalysis Basic - ( 21 Nov 2023 06:40 )    Color: x / Appearance: x / SG: x / pH: x  Gluc: 112 mg/dL / Ketone: x  / Bili: x / Urobili: x   Blood: x / Protein: x / Nitrite: x   Leuk Esterase: x / RBC: x / WBC x   Sq Epi: x / Non Sq Epi: x / Bacteria: x        RADIOLOGY & ADDITIONAL STUDIES:      CT head, CTA head and neck 11/18/23:  CT HEAD:  No acute intracranial findings.    CTA HEAD:  No flow-limiting stenosis, occlusion or aneurysm.    CTA NECK:  Moderate, 60%, narrowing right ICA origin.  No other significant arterial narrowing in the neck.    MRI head 11/18/23:  Age-appropriate involutional and ischemic gliotic changes.   Tiny acute infarct right frontal precentral gyrus.    Carotid ultrasound 11/18/23:  No significant hemodynamic stenosis of either carotid artery.    MYA 11/20/23:   A transesophageal echocardiogram was performed and included probe   placement in the esophagus posterior to the heart by the interpreting   physician, real-time image acquisition and interpretation utilizing 2-D,   color flow Doppler, pulsed wave and/orcontinuous wave with spectral   display. The patient received IV sedation. The procedure was monitored   with automatic blood pressure monitoring, telemetry tracings, and pulse   oximetry. The patient tolerated the procedure well and there were no   complications.     Normal left ventricular size and systolic function. Estimated LVEF is   60-65%.   Left atrial occlusion device present with small jose-device leak. No   evidence of jose-device thrombus.   Moderate mitral and tricuspid regurgitation.

## 2023-11-21 NOTE — PROGRESS NOTE ADULT - PROBLEM SELECTOR PLAN 1
per neurology very small right frontal stroke may be due to cardioembolic causes.  - discussed w/ Dr. Lenz, device associated thrombus or peridevice leak  may be a cause for CVA in this pt w/ watchman device.    -Pt s/p MYA 11/20/23 Normal LVF, EF 60-65%. Left atrial occlusion device present with small jose-device leak. No   evidence of jose-device thrombus, Moderate MR/TR  -Continue lovenox per neurology very small right frontal stroke may be due to cardioembolic causes.  - discussed w/ Dr. Lenz, device associated thrombus or jose device leak  may be a cause for CVA in this pt w/ watchman device.    -Pt s/p MYA 11/20/23 Normal LVF, EF 60-65%. Left atrial occlusion device present with very small jose-device leak. No   evidence of jose-device thrombus, Moderate MR/TR

## 2023-11-22 LAB
ANION GAP SERPL CALC-SCNC: 4 MMOL/L — LOW (ref 5–17)
ANION GAP SERPL CALC-SCNC: 4 MMOL/L — LOW (ref 5–17)
BASOPHILS # BLD AUTO: 0.03 K/UL — SIGNIFICANT CHANGE UP (ref 0–0.2)
BASOPHILS # BLD AUTO: 0.03 K/UL — SIGNIFICANT CHANGE UP (ref 0–0.2)
BASOPHILS NFR BLD AUTO: 0.4 % — SIGNIFICANT CHANGE UP (ref 0–2)
BASOPHILS NFR BLD AUTO: 0.4 % — SIGNIFICANT CHANGE UP (ref 0–2)
BUN SERPL-MCNC: 16 MG/DL — SIGNIFICANT CHANGE UP (ref 7–23)
BUN SERPL-MCNC: 16 MG/DL — SIGNIFICANT CHANGE UP (ref 7–23)
CALCIUM SERPL-MCNC: 8.9 MG/DL — SIGNIFICANT CHANGE UP (ref 8.5–10.1)
CALCIUM SERPL-MCNC: 8.9 MG/DL — SIGNIFICANT CHANGE UP (ref 8.5–10.1)
CHLORIDE SERPL-SCNC: 98 MMOL/L — SIGNIFICANT CHANGE UP (ref 96–108)
CHLORIDE SERPL-SCNC: 98 MMOL/L — SIGNIFICANT CHANGE UP (ref 96–108)
CO2 SERPL-SCNC: 32 MMOL/L — HIGH (ref 22–31)
CO2 SERPL-SCNC: 32 MMOL/L — HIGH (ref 22–31)
CREAT SERPL-MCNC: 0.78 MG/DL — SIGNIFICANT CHANGE UP (ref 0.5–1.3)
CREAT SERPL-MCNC: 0.78 MG/DL — SIGNIFICANT CHANGE UP (ref 0.5–1.3)
EGFR: 98 ML/MIN/1.73M2 — SIGNIFICANT CHANGE UP
EGFR: 98 ML/MIN/1.73M2 — SIGNIFICANT CHANGE UP
EOSINOPHIL # BLD AUTO: 0.16 K/UL — SIGNIFICANT CHANGE UP (ref 0–0.5)
EOSINOPHIL # BLD AUTO: 0.16 K/UL — SIGNIFICANT CHANGE UP (ref 0–0.5)
EOSINOPHIL NFR BLD AUTO: 2.3 % — SIGNIFICANT CHANGE UP (ref 0–6)
EOSINOPHIL NFR BLD AUTO: 2.3 % — SIGNIFICANT CHANGE UP (ref 0–6)
GLUCOSE SERPL-MCNC: 109 MG/DL — HIGH (ref 70–99)
GLUCOSE SERPL-MCNC: 109 MG/DL — HIGH (ref 70–99)
HCT VFR BLD CALC: 34 % — LOW (ref 39–50)
HCT VFR BLD CALC: 34 % — LOW (ref 39–50)
HGB BLD-MCNC: 10.6 G/DL — LOW (ref 13–17)
HGB BLD-MCNC: 10.6 G/DL — LOW (ref 13–17)
IMM GRANULOCYTES NFR BLD AUTO: 0.4 % — SIGNIFICANT CHANGE UP (ref 0–0.9)
IMM GRANULOCYTES NFR BLD AUTO: 0.4 % — SIGNIFICANT CHANGE UP (ref 0–0.9)
LYMPHOCYTES # BLD AUTO: 1.07 K/UL — SIGNIFICANT CHANGE UP (ref 1–3.3)
LYMPHOCYTES # BLD AUTO: 1.07 K/UL — SIGNIFICANT CHANGE UP (ref 1–3.3)
LYMPHOCYTES # BLD AUTO: 15.5 % — SIGNIFICANT CHANGE UP (ref 13–44)
LYMPHOCYTES # BLD AUTO: 15.5 % — SIGNIFICANT CHANGE UP (ref 13–44)
MCHC RBC-ENTMCNC: 28.3 PG — SIGNIFICANT CHANGE UP (ref 27–34)
MCHC RBC-ENTMCNC: 28.3 PG — SIGNIFICANT CHANGE UP (ref 27–34)
MCHC RBC-ENTMCNC: 31.2 GM/DL — LOW (ref 32–36)
MCHC RBC-ENTMCNC: 31.2 GM/DL — LOW (ref 32–36)
MCV RBC AUTO: 90.7 FL — SIGNIFICANT CHANGE UP (ref 80–100)
MCV RBC AUTO: 90.7 FL — SIGNIFICANT CHANGE UP (ref 80–100)
MONOCYTES # BLD AUTO: 0.49 K/UL — SIGNIFICANT CHANGE UP (ref 0–0.9)
MONOCYTES # BLD AUTO: 0.49 K/UL — SIGNIFICANT CHANGE UP (ref 0–0.9)
MONOCYTES NFR BLD AUTO: 7.1 % — SIGNIFICANT CHANGE UP (ref 2–14)
MONOCYTES NFR BLD AUTO: 7.1 % — SIGNIFICANT CHANGE UP (ref 2–14)
NEUTROPHILS # BLD AUTO: 5.11 K/UL — SIGNIFICANT CHANGE UP (ref 1.8–7.4)
NEUTROPHILS # BLD AUTO: 5.11 K/UL — SIGNIFICANT CHANGE UP (ref 1.8–7.4)
NEUTROPHILS NFR BLD AUTO: 74.3 % — SIGNIFICANT CHANGE UP (ref 43–77)
NEUTROPHILS NFR BLD AUTO: 74.3 % — SIGNIFICANT CHANGE UP (ref 43–77)
PLATELET # BLD AUTO: 185 K/UL — SIGNIFICANT CHANGE UP (ref 150–400)
PLATELET # BLD AUTO: 185 K/UL — SIGNIFICANT CHANGE UP (ref 150–400)
POTASSIUM SERPL-MCNC: 4 MMOL/L — SIGNIFICANT CHANGE UP (ref 3.5–5.3)
POTASSIUM SERPL-MCNC: 4 MMOL/L — SIGNIFICANT CHANGE UP (ref 3.5–5.3)
POTASSIUM SERPL-SCNC: 4 MMOL/L — SIGNIFICANT CHANGE UP (ref 3.5–5.3)
POTASSIUM SERPL-SCNC: 4 MMOL/L — SIGNIFICANT CHANGE UP (ref 3.5–5.3)
RBC # BLD: 3.75 M/UL — LOW (ref 4.2–5.8)
RBC # BLD: 3.75 M/UL — LOW (ref 4.2–5.8)
RBC # FLD: 16.3 % — HIGH (ref 10.3–14.5)
RBC # FLD: 16.3 % — HIGH (ref 10.3–14.5)
SODIUM SERPL-SCNC: 134 MMOL/L — LOW (ref 135–145)
SODIUM SERPL-SCNC: 134 MMOL/L — LOW (ref 135–145)
WBC # BLD: 6.89 K/UL — SIGNIFICANT CHANGE UP (ref 3.8–10.5)
WBC # BLD: 6.89 K/UL — SIGNIFICANT CHANGE UP (ref 3.8–10.5)
WBC # FLD AUTO: 6.89 K/UL — SIGNIFICANT CHANGE UP (ref 3.8–10.5)
WBC # FLD AUTO: 6.89 K/UL — SIGNIFICANT CHANGE UP (ref 3.8–10.5)

## 2023-11-22 PROCEDURE — 99497 ADVNCD CARE PLAN 30 MIN: CPT | Mod: 25

## 2023-11-22 PROCEDURE — 99233 SBSQ HOSP IP/OBS HIGH 50: CPT

## 2023-11-22 PROCEDURE — 99232 SBSQ HOSP IP/OBS MODERATE 35: CPT

## 2023-11-22 PROCEDURE — 99223 1ST HOSP IP/OBS HIGH 75: CPT

## 2023-11-22 RX ORDER — FUROSEMIDE 40 MG
40 TABLET ORAL DAILY
Refills: 0 | Status: DISCONTINUED | OUTPATIENT
Start: 2023-11-22 | End: 2023-11-26

## 2023-11-22 RX ADMIN — GABAPENTIN 400 MILLIGRAM(S): 400 CAPSULE ORAL at 16:15

## 2023-11-22 RX ADMIN — BUDESONIDE AND FORMOTEROL FUMARATE DIHYDRATE 2 PUFF(S): 160; 4.5 AEROSOL RESPIRATORY (INHALATION) at 21:02

## 2023-11-22 RX ADMIN — PANTOPRAZOLE SODIUM 40 MILLIGRAM(S): 20 TABLET, DELAYED RELEASE ORAL at 06:15

## 2023-11-22 RX ADMIN — CLOPIDOGREL BISULFATE 75 MILLIGRAM(S): 75 TABLET, FILM COATED ORAL at 10:45

## 2023-11-22 RX ADMIN — Medication 1 MILLIGRAM(S): at 21:38

## 2023-11-22 RX ADMIN — TIOTROPIUM BROMIDE 2 PUFF(S): 18 CAPSULE ORAL; RESPIRATORY (INHALATION) at 09:56

## 2023-11-22 RX ADMIN — Medication 81 MILLIGRAM(S): at 10:44

## 2023-11-22 RX ADMIN — BUDESONIDE AND FORMOTEROL FUMARATE DIHYDRATE 2 PUFF(S): 160; 4.5 AEROSOL RESPIRATORY (INHALATION) at 09:55

## 2023-11-22 RX ADMIN — ALBUTEROL 2 PUFF(S): 90 AEROSOL, METERED ORAL at 21:02

## 2023-11-22 RX ADMIN — ATORVASTATIN CALCIUM 80 MILLIGRAM(S): 80 TABLET, FILM COATED ORAL at 21:37

## 2023-11-22 RX ADMIN — QUETIAPINE FUMARATE 100 MILLIGRAM(S): 200 TABLET, FILM COATED ORAL at 21:37

## 2023-11-22 RX ADMIN — Medication 10 MILLIGRAM(S): at 10:44

## 2023-11-22 RX ADMIN — Medication 1 TABLET(S): at 10:44

## 2023-11-22 RX ADMIN — Medication 10 MILLIGRAM(S): at 21:37

## 2023-11-22 RX ADMIN — Medication 650 MILLIGRAM(S): at 19:01

## 2023-11-22 RX ADMIN — Medication 1 MILLIGRAM(S): at 10:45

## 2023-11-22 RX ADMIN — ALBUTEROL 2 PUFF(S): 90 AEROSOL, METERED ORAL at 09:55

## 2023-11-22 RX ADMIN — Medication 100 MILLIGRAM(S): at 10:45

## 2023-11-22 RX ADMIN — Medication 2 PACKET(S): at 10:48

## 2023-11-22 RX ADMIN — ENOXAPARIN SODIUM 40 MILLIGRAM(S): 100 INJECTION SUBCUTANEOUS at 06:15

## 2023-11-22 RX ADMIN — Medication 180 MILLIGRAM(S): at 10:44

## 2023-11-22 RX ADMIN — Medication 40 MILLIGRAM(S): at 10:45

## 2023-11-22 RX ADMIN — Medication 650 MILLIGRAM(S): at 06:08

## 2023-11-22 RX ADMIN — SPIRONOLACTONE 50 MILLIGRAM(S): 25 TABLET, FILM COATED ORAL at 10:47

## 2023-11-22 RX ADMIN — Medication 25 MILLIGRAM(S): at 10:45

## 2023-11-22 RX ADMIN — Medication 5 MILLIGRAM(S): at 21:38

## 2023-11-22 RX ADMIN — GABAPENTIN 400 MILLIGRAM(S): 400 CAPSULE ORAL at 06:15

## 2023-11-22 RX ADMIN — GABAPENTIN 400 MILLIGRAM(S): 400 CAPSULE ORAL at 21:37

## 2023-11-22 RX ADMIN — PREGABALIN 1000 MICROGRAM(S): 225 CAPSULE ORAL at 10:44

## 2023-11-22 RX ADMIN — Medication 25 MILLIGRAM(S): at 21:38

## 2023-11-22 NOTE — CONSULT NOTE ADULT - PROBLEM SELECTOR PROBLEM 2
Acute on chronic heart failure with preserved ejection fraction (HFpEF)
Acute diastolic congestive heart failure
Obesity hypoventilation syndrome

## 2023-11-22 NOTE — GOALS OF CARE CONVERSATION - ADVANCED CARE PLANNING - WHAT MATTERS MOST
Take things day by day. Continue treatments.
daughter and patient would like him to be discharged before thanks giving

## 2023-11-22 NOTE — PROGRESS NOTE ADULT - SUBJECTIVE AND OBJECTIVE BOX
HPI:  69 y/o Male with a PMHx of anemia, Afib, CHF, COPD, cirrhosis, collapsed lung, emphysema  EtOH abuse, EtOH withdrawal, falls, GERD, HTN, meningitis, presence of Watchman left atrial appendage closure device presented with persistent dizziness/ vertigo. Patient reports he has had similar issues. Symptoms started suddenly while at home yesterday while walking, patient sat himself down, denies falls or injuries.  Reported constant dizziness and spinning sensation associated with nausea, no vomiting.  Also reports mild headache and dry mouth. No chest pain. No SOB. No recent sickness. No recent travel.  (18 Nov 2023 06:20)    Admitted for CVA. MRI showed "tiny acute infarct right frontal precentral gyrus"  Pt has h/o permanent afib w/ watchman device implanted 8 yrs ago at hospital in [place].  Reports some LE edema today worse since admission no dyspnea.    11/20/23: NPO for MYA today. No complaints this morning   11/21/23: Pt denies cp or sob.  Occasional dizziness.  Tele: Afib 60's-90's bradys down into 30's during sleeping hours  11/22/23: Pt without cardiac complaints. Did not sleep well last night.  Tired today.  Tele: Afib, rate controlled, bradys down into 30's during sleeping hours    PAST MEDICAL AND SURGICAL HISTORY:  PAST MEDICAL & SURGICAL HISTORY:  Chronic atrial fibrillation      Alcohol abuse      Poor historian      CHF (congestive heart failure)      Cirrhosis      Emphysema of lung      Alcohol withdrawal      Collapsed lung      Meningitis      Falls      Anemia      Chronic obstructive pulmonary disease (COPD)      GERD (gastroesophageal reflux disease)      Hypertension      Presence of Watchman left atrial appendage closure device      Atrial fibrillation      COPD without exacerbation      Chronic CHF      S/P BKA (below knee amputation) unilateral, left      Presence of Watchman left atrial appendage closure device      Unilateral amputation of lower extremity below knee      H/O tracheostomy  now removed      S/P percutaneous endoscopic gastrostomy (PEG) tube placement  now removed          ALLERGIES:  Allergies    Duricef (Rash)  Ceclor (Rash)    Intolerances        SOCIAL HISTORY:  Social History:  Non smoker.  +Drinks alcohol   No other illicit drug abuse. (18 Nov 2023 06:20)      FAMILY  HISTORY:  FAMILY HISTORY:  No pertinent family history in first degree relatives        MEDICATIONS:  OUTPATIENT:  Home Medications:  albuterol 90 mcg/inh inhalation aerosol: 2 puff(s) inhaled every 4 hours, As Needed for wheezing (18 Nov 2023 05:46)  Aspirin Enteric Coated 81 mg oral delayed release tablet: 1 tab(s) orally once a day (18 Nov 2023 05:46)  budesonide-formoterol 160 mcg-4.5 mcg/inh inhalation aerosol: 2 puff(s) inhaled 2 times a day (18 Nov 2023 05:46)  busPIRone 10 mg oral tablet: 1 tab(s) orally 2 times a day (18 Nov 2023 05:46)  cholecalciferol 25 mcg (1000 intl units) oral tablet: 1 tab(s) orally once a day (18 Nov 2023 05:46)  Colace 100 mg oral capsule: 2 cap(s) orally once a day (18 Nov 2023 05:46)  cyanocobalamin 1000 mcg oral tablet: 1 tab(s) orally once a day (18 Nov 2023 05:46)  doxazosin 1 mg oral tablet: 1 tab(s) orally once a day (at bedtime) (18 Nov 2023 05:46)  Farxiga 10 mg oral tablet: 1 tab(s) orally once a day (18 Nov 2023 05:46)  folic acid 1 mg oral tablet: 1 tab(s) orally once a day (18 Nov 2023 05:46)  furosemide 20 mg oral tablet: 2 tab(s) orally once a day (18 Nov 2023 05:46)  gabapentin 400 mg oral capsule: 1 cap(s) orally 3 times a day (18 Nov 2023 05:46)  Metoprolol Tartrate 25 mg oral tablet: 1 tab(s) orally 2 times a day (18 Nov 2023 05:46)  omeprazole 40 mg oral delayed release capsule: 1 cap(s) orally once a day (18 Nov 2023 05:46)  psyllium 500 mg oral capsule: 2 cap(s) orally once a day (18 Nov 2023 05:46)  QUEtiapine 50 mg oral tablet: 2 tab(s) orally once a day (at bedtime) (18 Nov 2023 05:46)  spironolactone 25 mg oral tablet: 2 tab(s) orally once a day (18 Nov 2023 05:46)  thiamine 100 mg oral tablet: 1 tab(s) orally once a day (18 Nov 2023 05:46)    MEDICATIONS  (STANDING):  aspirin enteric coated 81 milliGRAM(s) Oral daily  atorvastatin 80 milliGRAM(s) Oral at bedtime  budesonide 160 MICROgram(s)/formoterol 4.5 MICROgram(s) Inhaler 2 Puff(s) Inhalation two times a day  busPIRone 10 milliGRAM(s) Oral two times a day  clopidogrel Tablet 75 milliGRAM(s) Oral daily  cyanocobalamin 1000 MICROGram(s) Oral daily  diltiazem    milliGRAM(s) Oral daily  doxazosin 1 milliGRAM(s) Oral at bedtime  enoxaparin Injectable 40 milliGRAM(s) SubCutaneous every 24 hours  folic acid 1 milliGRAM(s) Oral daily  furosemide    Tablet 40 milliGRAM(s) Oral daily  gabapentin 400 milliGRAM(s) Oral three times a day  influenza  Vaccine (HIGH DOSE) 0.7 milliLiter(s) IntraMuscular once  metoprolol tartrate 25 milliGRAM(s) Oral two times a day  multivitamin 1 Tablet(s) Oral daily  pantoprazole    Tablet 40 milliGRAM(s) Oral before breakfast  psyllium Powder 2 Packet(s) Oral daily  QUEtiapine 100 milliGRAM(s) Oral at bedtime  spironolactone 50 milliGRAM(s) Oral daily  thiamine 100 milliGRAM(s) Oral daily  tiotropium 2.5 MICROgram(s) Inhaler 2 Puff(s) Inhalation daily  zaleplon 5 milliGRAM(s) Oral at bedtime    MEDICATIONS  (PRN):  acetaminophen     Tablet .. 650 milliGRAM(s) Oral every 6 hours PRN Mild Pain (1 - 3)  albuterol    90 MICROgram(s) HFA Inhaler 2 Puff(s) Inhalation every 6 hours PRN Shortness of Breath and/or Wheezing  benzocaine/menthol Lozenge 1 Lozenge Oral every 6 hours PRN Sore Throat  meclizine 25 milliGRAM(s) Oral every 8 hours PRN Dizziness  ondansetron Injectable 4 milliGRAM(s) IV Push every 6 hours PRN Nausea and/or Vomiting      Vital Signs Last 24 Hrs  T(C): 36.3 (22 Nov 2023 08:04), Max: 36.4 (21 Nov 2023 21:30)  T(F): 97.3 (22 Nov 2023 08:04), Max: 97.5 (21 Nov 2023 21:30)  HR: 60 (22 Nov 2023 08:04) (57 - 88)  BP: 100/50 (22 Nov 2023 08:04) (100/50 - 117/62)  BP(mean): --  RR: 23 (22 Nov 2023 08:04) (17 - 23)  SpO2: 100% (22 Nov 2023 08:04) (95% - 100%)    Parameters below as of 22 Nov 2023 08:04  Patient On (Oxygen Delivery Method): nasal cannula  O2 Flow (L/min): 3    I&O's Summary    19 Nov 2023 07:01  -  19 Nov 2023 19:05  --------------------------------------------------------  IN: 0 mL / OUT: 1300 mL / NET: -1300 mL        I&O's Detail    19 Nov 2023 07:01  -  19 Nov 2023 19:05  --------------------------------------------------------  IN:  Total IN: 0 mL    OUT:    Voided (mL): 1300 mL  Total OUT: 1300 mL    Total NET: -1300 mL          PHYSICAL EXAM:    Constitutional: NAD, awake   HEENT: PERR, EOMI  Neck:  supple,  No JVD  Respiratory: Breath sounds with rhonchi and faint exp wheeze  Cardiovascular: S1 and S2, irregularly irregular. no Murmurs, gallops or rubs  Gastrointestinal: Bowel Sounds present, soft, nontender.   Extremities: s/p left BKA. mild edema right LE   Neurological: A/O x 3, no focal deficits  Musculoskeletal: no calf tenderness.  Skin: No rashes.    ===============================  ===============================  LABS:                           10.6   6.89  )-----------( 185      ( 22 Nov 2023 06:31 )             34.0                           11.1   7.34  )-----------( 175      ( 21 Nov 2023 06:40 )             35.4                           10.8   8.37  )-----------( 171      ( 17 Nov 2023 23:42 )             33.6     11-22    134<L>  |  98  |  16  ----------------------------<  109<H>  4.0   |  32<H>  |  0.78    Ca    8.9      22 Nov 2023 06:31        11-21    136  |  99  |  14  ----------------------------<  112<H>  3.7   |  32<H>  |  0.88    Ca    8.8      21 Nov 2023 06:40        17 Nov 2023 23:42    136    |  101    |  11     ----------------------------<  136    4.2     |  29     |  0.77     Ca    8.4        17 Nov 2023 23:42    TPro  7.0    /  Alb  2.7    /  TBili  0.4    /  DBili  x      /  AST  24     /  ALT  13     /  AlkPhos  84     17 Nov 2023 23:42    PT/INR - ( 18 Nov 2023 01:49 )   PT: 12.2 sec;   INR: 1.08 ratio         PTT - ( 18 Nov 2023 01:49 )  PTT:31.9 sec    THYROID STUDIES:    Thyroid Stimulating Hormone, Serum: 0.79 uU/mL (07.14.23 @ 06:47) ===============================  ===============================  CARDIAC BIOMARKERS:  -------  -BNP VALUES:  11-13 @ 06:45  Pro Bnp 3031  11-09 @ 09:24  Pro Bnp 4176    -------  -TROPONIN VALUES:   Troponin I, High Sensitivity Result: 7.86 ng/L (08-30-23 @ 07:31)  Troponin I, High Sensitivity Result: 5.72 ng/L (07-09-23 @ 07:07)  Troponin I, High Sensitivity Result: 7.54 ng/L (07-08-23 @ 09:51)  Troponin I, High Sensitivity Result: 8.42 ng/L (05-28-23 @ 06:51)  Troponin I, High Sensitivity Result: 7.18 ng/L (05-27-23 @ 09:22)  Troponin I, High Sensitivity Result: 7.58 ng/L (05-27-23 @ 01:23)  Troponin I, High Sensitivity Result: 15.54 ng/L (04-22-23 @ 04:05)  Troponin I, High Sensitivity Result: 15.75 ng/L (04-22-23 @ 00:41)  Troponin I, High Sensitivity Result: 14.31 ng/L (11-09-21 @ 12:13)  Troponin I, High Sensitivity Result: 19.27 ng/L (11-09-21 @ 09:24)      ===============================  ===============================  EKG: afib rate controlled.        < from: MYA Complete w/Spect and Color (11.20.23 @ 15:07) >   Impression     Summary     A transesophageal echocardiogram was performed and included probe   placement in the esophagus posterior to the heart by the interpreting   physician, real-time image acquisition and interpretation utilizing 2-D,   color flow Doppler, pulsed wave and/orcontinuous wave with spectral   display. The patient received IV sedation. The procedure was monitored   with automatic blood pressure monitoring, telemetry tracings, and pulse   oximetry. The patient tolerated the procedure well and there were no   complications.     Normal left ventricular size and systolic function. Estimated LVEF is   60-65%.   Left atrial occlusion device present with small jose-device leak. No   evidence of jose-device thrombus.   Moderate mitral and tricuspid regurgitation.    < end of copied text >

## 2023-11-22 NOTE — CONSULT NOTE ADULT - WHAT MATTERS MOST
to mitigate patient suffering to mitigate patient suffering; respect patient's wishes take things day by day

## 2023-11-22 NOTE — GOALS OF CARE CONVERSATION - ADVANCED CARE PLANNING - CONVERSATION DETAILS
patient is DNR DNI and trial of NIV  patient does not want any extraordinary measures , he does not want to be resuscitated with chest compressions or intubation/mechanical ventilation should the need arise  he agrees with trial of NIV
Palliative SW & NP met with patient, pt's daughter Snehal (at bedside) and pt's daughter Snehal & ex-wife via telephone. We explained role of palliative team and offered support. Daughter Litzy mentioned that pt was interested in hospice as per one of their conversations previously. Upon speaking about options, pt explains that he is fine with coming back and forth to the hospital as he has had good experiences here. He feels that he has good quality of life at this point. Litzy explained that she doesn't want to see him suffer --pt explains that he does not feel as though he is suffering. Team shared that we do not feel he is hospice appropriate at this point but that it will likely be an option in the future as his diseases are progressive.     Pt explains that he would never want to live in a vegetative state and he would not want to be hospital-dependent. We encouraged patient and family to continue these conversations so that family understands what is important to patient.    MOLST on chart reflecting DNR/DNI with trial NIV - confirmed wishes.     Emotional support provided. [facility]. Our team will continue to follow.

## 2023-11-22 NOTE — CONSULT NOTE ADULT - SUBJECTIVE AND OBJECTIVE BOX
HPI: Pt is a 68y old Male with hx of anemia, Afib, CHF, COPD, cirrhosis, collapsed lung, emphysema  EtOH abuse, EtOH withdrawal, falls, GERD, HTN, meningitis, presence of Watchman left atrial appendage closure device presented with persistent dizziness/ vertigo. Patient reports he has had similar issues. Symptoms started suddenly while at home yesterday while walking, patient sat himself down, denies falls or injuries.  Reported constant dizziness and spinning sensation associated with nausea, no vomiting.  Also reports mild headache and dry mouth. No chest pain. No SOB. No recent sickness. No recent travel. Palliative consulted for [facility].    : Lethargic, unwilling to talk to palliative at this time. Requesting to sleep, agreed to speak with palliative in afternoon.      PAIN: ( )Yes   ( )No  Level:  Location:  Intensity:    /10  Quality:  Aggravating Factors:  Alleviating Factors:  Radiation:  Duration/Timing:  Impact on ADLs:    DYSPNEA: ( ) Yes  (X) No  on 3LNC, no dyspnea at rest in NAD      PAST MEDICAL & SURGICAL HISTORY:  Chronic atrial fibrillation  Alcohol abuse  Poor historian  CHF (congestive heart failure)  Cirrhosis  Emphysema of lung  Alcohol withdrawal  Collapsed lung  Meningitis  Falls  Anemia  Chronic obstructive pulmonary disease (COPD)  GERD (gastroesophageal reflux disease)  Hypertension  Presence of Watchman left atrial appendage closure device  Atrial fibrillation  COPD without exacerbation  Chronic CHF  S/P BKA (below knee amputation) unilateral, left  Presence of Watchman left atrial appendage closure device  Unilateral amputation of lower extremity below knee  H/O tracheostomy  now removed  S/P percutaneous endoscopic gastrostomy (PEG) tube placement  now removed      SOCIAL HX:    Hx opiate tolerance ( )YES  ( )NO    Baseline ADLs  (Prior to Admission)  ( ) Independent   ( )Dependent    FAMILY HISTORY:  No pertinent family history in first degree relatives        Review of Systems:    Anxiety-  Depression-  Physical Discomfort-  Dyspnea-  Constipation-  Diarrhea-  Nausea-  Vomiting-  Anorexia-  Weight Loss-   Cough-  Secretions-  Fatigue-  Weakness-  Delirium-    All other systems reviewed and negative  Unable to obtain/Limited due to:      PHYSICAL EXAM:    Vital Signs Last 24 Hrs  T(C): 36.3 (2023 08:04), Max: 36.4 (2023 21:30)  T(F): 97.3 (2023 08:04), Max: 97.5 (2023 21:30)  HR: 60 (2023 08:04) (57 - 88)  BP: 100/50 (2023 08:04) (100/50 - 117/62)  BP(mean): --  RR: 23 (2023 08:04) (17 - 23)  SpO2: 100% (2023 08:04) (95% - 100%)    Parameters below as of 2023 08:04  Patient On (Oxygen Delivery Method): nasal cannula  O2 Flow (L/min): 3    Daily     Daily Weight in k.6 (2023 05:10)    PPSV2:  30%  FAST:    General:  Mental Status:  HEENT:  Lungs:  Cardiac:  GI:  :  Ext:  Neuro:      LABS:                        10.6   6.89  )-----------( 185      ( 2023 06:31 )             34.0     11-    134<L>  |  98  |  16  ----------------------------<  109<H>  4.0   |  32<H>  |  0.78    Ca    8.9      2023 06:31        Albumin: Albumin: 2.7 g/dL (-17 @ 23:42)      Allergies  Duricef (Rash)  Ceclor (Rash)    Intolerances      MEDICATIONS  (STANDING):  aspirin enteric coated 81 milliGRAM(s) Oral daily  atorvastatin 80 milliGRAM(s) Oral at bedtime  budesonide 160 MICROgram(s)/formoterol 4.5 MICROgram(s) Inhaler 2 Puff(s) Inhalation two times a day  busPIRone 10 milliGRAM(s) Oral two times a day  clopidogrel Tablet 75 milliGRAM(s) Oral daily  cyanocobalamin 1000 MICROGram(s) Oral daily  diltiazem    milliGRAM(s) Oral daily  doxazosin 1 milliGRAM(s) Oral at bedtime  enoxaparin Injectable 40 milliGRAM(s) SubCutaneous every 24 hours  folic acid 1 milliGRAM(s) Oral daily  furosemide    Tablet 40 milliGRAM(s) Oral daily  gabapentin 400 milliGRAM(s) Oral three times a day  influenza  Vaccine (HIGH DOSE) 0.7 milliLiter(s) IntraMuscular once  metoprolol tartrate 25 milliGRAM(s) Oral two times a day  multivitamin 1 Tablet(s) Oral daily  pantoprazole    Tablet 40 milliGRAM(s) Oral before breakfast  psyllium Powder 2 Packet(s) Oral daily  QUEtiapine 100 milliGRAM(s) Oral at bedtime  spironolactone 50 milliGRAM(s) Oral daily  thiamine 100 milliGRAM(s) Oral daily  tiotropium 2.5 MICROgram(s) Inhaler 2 Puff(s) Inhalation daily  zaleplon 5 milliGRAM(s) Oral at bedtime    MEDICATIONS  (PRN):  acetaminophen     Tablet .. 650 milliGRAM(s) Oral every 6 hours PRN Mild Pain (1 - 3)  albuterol    90 MICROgram(s) HFA Inhaler 2 Puff(s) Inhalation every 6 hours PRN Shortness of Breath and/or Wheezing  benzocaine/menthol Lozenge 1 Lozenge Oral every 6 hours PRN Sore Throat  meclizine 25 milliGRAM(s) Oral every 8 hours PRN Dizziness  ondansetron Injectable 4 milliGRAM(s) IV Push every 6 hours PRN Nausea and/or Vomiting      RADIOLOGY/ADDITIONAL STUDIES:  < from: MR Head No Cont (23 @ 12:45) >    ACC: [id number] EXAM:  MR BRAIN   ORDERED BY: TR LOYA     PROCEDURE DATE:  2023          INTERPRETATION:  CLINICAL INDICATION: Acute onset dizziness, feels foggy      Magnetic resonance imaging of the brain was carried out with transaxial   SPGR, FLAIR, fast spin echo T2 weighted images, axial susceptibility   weighted series, diffusion weighted series and sagittal T1 weighted   series on a 5 Antonella magnet.    Comparison is made with the prior CT/CTA of 2023 and MRI 2017.    Ventricular and sulcal prominence is consistent with age-appropriate   involutional change. Small vessel white matter ischemic changes are   noted. There is a tiny acute infarct in a high right frontal cortex along   the precentral gyrus, new since 2017. No acute hemorrhage is   identified.    The sellar and parasellar structures are unremarkable. There is mild   mucosal thickening of the ethmoid sinuses.    IMPRESSION: Age-appropriate involutional and ischemic gliotic changes.   Tiny acute infarct right frontal precentral gyrus.    --- End of Report ---            DIANE WEISS MD; Attending Radiologist  This document has been electronically signed. 2023 12:57PM    < end of copied text >    < from: Xray Chest 1 View- PORTABLE-Urgent (Xray Chest 1 View- PORTABLE-Urgent .) (11.19.23 @ 13:54) >    ACC: [id number] EXAM:  XR CHEST PORTABLE URGENT 1V   ORDERED BY: TR LOYA     PROCEDURE DATE:  2023          INTERPRETATION:  Chest one view    HISTORY: Tachypnea    COMPARISON STUDY: 2023    Frontal expiratory view of the chest shows the heart to be similarly   enlarged in size. The lungs show clear right lung with small left lower   lobe infiltrate and there is no evidence of pneumothorax nor definite   pleural effusion.    IMPRESSION:  Small left infiltrate.        Thank you for the courtesy of this referral.    --- End of Report ---            FELICIA QUIROZ MD; Attending Interventional Radiologist  This document has been electronically signed. 2023  3:39PM    < end of copied text >   HPI: Pt is a 68y old Male with hx of anemia, Afib, CHF, COPD, cirrhosis, collapsed lung, emphysema  EtOH abuse, EtOH withdrawal, falls, GERD, HTN, meningitis, presence of Watchman left atrial appendage closure device presented with persistent dizziness/ vertigo. Patient reports he has had similar issues. Symptoms started suddenly while at home yesterday while walking, patient sat himself down, denies falls or injuries.  Reported constant dizziness and spinning sensation associated with nausea, no vomiting.  Also reports mild headache and dry mouth. No chest pain. No SOB. No recent sickness. No recent travel. Palliative consulted for [facility].    : Lethargic, unwilling to talk to palliative at this time. Requesting to sleep, agreed to speak with palliative in afternoon.      PAIN: ( )Yes   (X)No  denies    DYSPNEA: ( ) Yes  (X) No  on 3LNC, no dyspnea at rest in NAD      PAST MEDICAL & SURGICAL HISTORY:  Chronic atrial fibrillation  Alcohol abuse  Poor historian  CHF (congestive heart failure)  Cirrhosis  Emphysema of lung  Alcohol withdrawal  Collapsed lung  Meningitis  Falls  Anemia  Chronic obstructive pulmonary disease (COPD)  GERD (gastroesophageal reflux disease)  Hypertension  Presence of Watchman left atrial appendage closure device  Atrial fibrillation  COPD without exacerbation  Chronic CHF  S/P BKA (below knee amputation) unilateral, left  Presence of Watchman left atrial appendage closure device  Unilateral amputation of lower extremity below knee  H/O tracheostomy  now removed  S/P percutaneous endoscopic gastrostomy (PEG) tube placement  now removed      SOCIAL HX:    Hx opiate tolerance ( )YES  ( )NO    Baseline ADLs  (Prior to Admission)  ( ) Independent   ( )Dependent    FAMILY HISTORY:  No pertinent family history in first degree relatives        Review of Systems:    Anxiety-  Depression-  Physical Discomfort-  Dyspnea-  Constipation-  Diarrhea-  Nausea-  Vomiting-  Anorexia-  Weight Loss-   Cough-  Secretions-  Fatigue-  Weakness-  Delirium-    All other systems reviewed and negative  Unable to obtain/Limited due to:      PHYSICAL EXAM:    Vital Signs Last 24 Hrs  T(C): 36.3 (2023 08:04), Max: 36.4 (2023 21:30)  T(F): 97.3 (2023 08:04), Max: 97.5 (2023 21:30)  HR: 60 (2023 08:04) (57 - 88)  BP: 100/50 (2023 08:04) (100/50 - 117/62)  BP(mean): --  RR: 23 (2023 08:04) (17 - 23)  SpO2: 100% (2023 08:04) (95% - 100%)    Parameters below as of 2023 08:04  Patient On (Oxygen Delivery Method): nasal cannula  O2 Flow (L/min): 3    Daily     Daily Weight in k.6 (2023 05:10)    PPSV2:  30%  FAST:    General:  Mental Status:  HEENT:  Lungs:  Cardiac:  GI:  :  Ext:  Neuro:      LABS:                        10.6   6.89  )-----------( 185      ( 2023 06:31 )             34.0         134<L>  |  98  |  16  ----------------------------<  109<H>  4.0   |  32<H>  |  0.78    Ca    8.9      2023 06:31        Albumin: Albumin: 2.7 g/dL ( @ 23:42)      Allergies  Duricef (Rash)  Ceclor (Rash)    Intolerances      MEDICATIONS  (STANDING):  aspirin enteric coated 81 milliGRAM(s) Oral daily  atorvastatin 80 milliGRAM(s) Oral at bedtime  budesonide 160 MICROgram(s)/formoterol 4.5 MICROgram(s) Inhaler 2 Puff(s) Inhalation two times a day  busPIRone 10 milliGRAM(s) Oral two times a day  clopidogrel Tablet 75 milliGRAM(s) Oral daily  cyanocobalamin 1000 MICROGram(s) Oral daily  diltiazem    milliGRAM(s) Oral daily  doxazosin 1 milliGRAM(s) Oral at bedtime  enoxaparin Injectable 40 milliGRAM(s) SubCutaneous every 24 hours  folic acid 1 milliGRAM(s) Oral daily  furosemide    Tablet 40 milliGRAM(s) Oral daily  gabapentin 400 milliGRAM(s) Oral three times a day  influenza  Vaccine (HIGH DOSE) 0.7 milliLiter(s) IntraMuscular once  metoprolol tartrate 25 milliGRAM(s) Oral two times a day  multivitamin 1 Tablet(s) Oral daily  pantoprazole    Tablet 40 milliGRAM(s) Oral before breakfast  psyllium Powder 2 Packet(s) Oral daily  QUEtiapine 100 milliGRAM(s) Oral at bedtime  spironolactone 50 milliGRAM(s) Oral daily  thiamine 100 milliGRAM(s) Oral daily  tiotropium 2.5 MICROgram(s) Inhaler 2 Puff(s) Inhalation daily  zaleplon 5 milliGRAM(s) Oral at bedtime    MEDICATIONS  (PRN):  acetaminophen     Tablet .. 650 milliGRAM(s) Oral every 6 hours PRN Mild Pain (1 - 3)  albuterol    90 MICROgram(s) HFA Inhaler 2 Puff(s) Inhalation every 6 hours PRN Shortness of Breath and/or Wheezing  benzocaine/menthol Lozenge 1 Lozenge Oral every 6 hours PRN Sore Throat  meclizine 25 milliGRAM(s) Oral every 8 hours PRN Dizziness  ondansetron Injectable 4 milliGRAM(s) IV Push every 6 hours PRN Nausea and/or Vomiting      RADIOLOGY/ADDITIONAL STUDIES:  < from: MR Head No Cont (23 @ 12:45) >    ACC: [id number] EXAM:  MR BRAIN   ORDERED BY: TR LOYA     PROCEDURE DATE:  2023          INTERPRETATION:  CLINICAL INDICATION: Acute onset dizziness, feels foggy      Magnetic resonance imaging of the brain was carried out with transaxial   SPGR, FLAIR, fast spin echo T2 weighted images, axial susceptibility   weighted series, diffusion weighted series and sagittal T1 weighted   series on a 5 Antonella magnet.    Comparison is made with the prior CT/CTA of 2023 and MRI 2017.    Ventricular and sulcal prominence is consistent with age-appropriate   involutional change. Small vessel white matter ischemic changes are   noted. There is a tiny acute infarct in a high right frontal cortex along   the precentral gyrus, new since 2017. No acute hemorrhage is   identified.    The sellar and parasellar structures are unremarkable. There is mild   mucosal thickening of the ethmoid sinuses.    IMPRESSION: Age-appropriate involutional and ischemic gliotic changes.   Tiny acute infarct right frontal precentral gyrus.    --- End of Report ---            DIANE WEISS MD; Attending Radiologist  This document has been electronically signed. 2023 12:57PM    < end of copied text >    < from: Xray Chest 1 View- PORTABLE-Urgent (Xray Chest 1 View- PORTABLE-Urgent .) ( @ 13:54) >    ACC: [id number] EXAM:  XR CHEST PORTABLE URGENT 1V   ORDERED BY: TR LOYA     PROCEDURE DATE:  2023          INTERPRETATION:  Chest one view    HISTORY: Tachypnea    COMPARISON STUDY: 2023    Frontal expiratory view of the chest shows the heart to be similarly   enlarged in size. The lungs show clear right lung with small left lower   lobe infiltrate and there is no evidence of pneumothorax nor definite   pleural effusion.    IMPRESSION:  Small left infiltrate.        Thank you for the courtesy of this referral.    --- End of Report ---            FELICIA QUIROZ MD; Attending Interventional Radiologist  This document has been electronically signed. 2023  3:39PM    < end of copied text >   HPI: Pt is a 68y old Male with hx of anemia, Afib, CHF, COPD, cirrhosis, collapsed lung, emphysema  EtOH abuse, EtOH withdrawal, falls, GERD, HTN, meningitis, presence of Watchman left atrial appendage closure device presented with persistent dizziness/ vertigo. Patient reports he has had similar issues. Symptoms started suddenly while at home yesterday while walking, patient sat himself down, denies falls or injuries.  Reported constant dizziness and spinning sensation associated with nausea, no vomiting.  Also reports mild headache and dry mouth. No chest pain. No SOB. No recent sickness. No recent travel. Palliative consulted for [facility].    : Lethargic, unwilling to talk to palliative at this time. Requesting to sleep, agreed to speak with palliative in afternoon. Met with patient at bedside this afternoon. Requested palliative to speak with his daughter, Litzy. Plan for [facility] meeting with patient and daughter at 2:30PM today.      PAIN: ( )Yes   (X)No  denies    DYSPNEA: ( ) Yes  (X) No  on 3LNC, no dyspnea at rest in NAD      PAST MEDICAL & SURGICAL HISTORY:  Chronic atrial fibrillation  Alcohol abuse  Poor historian  CHF (congestive heart failure)  Cirrhosis  Emphysema of lung  Alcohol withdrawal  Collapsed lung  Meningitis  Falls  Anemia  Chronic obstructive pulmonary disease (COPD)  GERD (gastroesophageal reflux disease)  Hypertension  Presence of Watchman left atrial appendage closure device  Atrial fibrillation  COPD without exacerbation  Chronic CHF  S/P BKA (below knee amputation) unilateral, left  Presence of Watchman left atrial appendage closure device  Unilateral amputation of lower extremity below knee  H/O tracheostomy  now removed  S/P percutaneous endoscopic gastrostomy (PEG) tube placement  now removed      SOCIAL HX:    Hx opiate tolerance ( )YES  ( )NO    Baseline ADLs  (Prior to Admission)  ( ) Independent   ( )Dependent    FAMILY HISTORY:  No pertinent family history in first degree relatives        Review of Systems:    Anxiety-  Depression-  Physical Discomfort-  Dyspnea-  Constipation-  Diarrhea-  Nausea-  Vomiting-  Anorexia-  Weight Loss-   Cough-  Secretions-  Fatigue-  Weakness-  Delirium-    All other systems reviewed and negative  Unable to obtain/Limited due to:      PHYSICAL EXAM:    Vital Signs Last 24 Hrs  T(C): 36.3 (2023 08:04), Max: 36.4 (2023 21:30)  T(F): 97.3 (2023 08:04), Max: 97.5 (2023 21:30)  HR: 60 (2023 08:04) (57 - 88)  BP: 100/50 (2023 08:04) (100/50 - 117/62)  BP(mean): --  RR: 23 (2023 08:04) (17 - 23)  SpO2: 100% (2023 08:04) (95% - 100%)    Parameters below as of 2023 08:04  Patient On (Oxygen Delivery Method): nasal cannula  O2 Flow (L/min): 3    Daily     Daily Weight in k.6 (2023 05:10)    PPSV2:  30%  FAST:    General:  Mental Status:  HEENT:  Lungs:  Cardiac:  GI:  :  Ext:  Neuro:      LABS:                        10.6   6.89  )-----------( 185      ( 2023 06:31 )             34.0     11-    134<L>  |  98  |  16  ----------------------------<  109<H>  4.0   |  32<H>  |  0.78    Ca    8.9      2023 06:31        Albumin: Albumin: 2.7 g/dL (11-17 @ 23:42)      Allergies  Duricef (Rash)  Ceclor (Rash)    Intolerances      MEDICATIONS  (STANDING):  aspirin enteric coated 81 milliGRAM(s) Oral daily  atorvastatin 80 milliGRAM(s) Oral at bedtime  budesonide 160 MICROgram(s)/formoterol 4.5 MICROgram(s) Inhaler 2 Puff(s) Inhalation two times a day  busPIRone 10 milliGRAM(s) Oral two times a day  clopidogrel Tablet 75 milliGRAM(s) Oral daily  cyanocobalamin 1000 MICROGram(s) Oral daily  diltiazem    milliGRAM(s) Oral daily  doxazosin 1 milliGRAM(s) Oral at bedtime  enoxaparin Injectable 40 milliGRAM(s) SubCutaneous every 24 hours  folic acid 1 milliGRAM(s) Oral daily  furosemide    Tablet 40 milliGRAM(s) Oral daily  gabapentin 400 milliGRAM(s) Oral three times a day  influenza  Vaccine (HIGH DOSE) 0.7 milliLiter(s) IntraMuscular once  metoprolol tartrate 25 milliGRAM(s) Oral two times a day  multivitamin 1 Tablet(s) Oral daily  pantoprazole    Tablet 40 milliGRAM(s) Oral before breakfast  psyllium Powder 2 Packet(s) Oral daily  QUEtiapine 100 milliGRAM(s) Oral at bedtime  spironolactone 50 milliGRAM(s) Oral daily  thiamine 100 milliGRAM(s) Oral daily  tiotropium 2.5 MICROgram(s) Inhaler 2 Puff(s) Inhalation daily  zaleplon 5 milliGRAM(s) Oral at bedtime    MEDICATIONS  (PRN):  acetaminophen     Tablet .. 650 milliGRAM(s) Oral every 6 hours PRN Mild Pain (1 - 3)  albuterol    90 MICROgram(s) HFA Inhaler 2 Puff(s) Inhalation every 6 hours PRN Shortness of Breath and/or Wheezing  benzocaine/menthol Lozenge 1 Lozenge Oral every 6 hours PRN Sore Throat  meclizine 25 milliGRAM(s) Oral every 8 hours PRN Dizziness  ondansetron Injectable 4 milliGRAM(s) IV Push every 6 hours PRN Nausea and/or Vomiting      RADIOLOGY/ADDITIONAL STUDIES:  < from: MR Head No Cont (23 @ 12:45) >    ACC: [id number] EXAM:  MR BRAIN   ORDERED BY: TR LOYA     PROCEDURE DATE:  2023          INTERPRETATION:  CLINICAL INDICATION: Acute onset dizziness, feels foggy      Magnetic resonance imaging of the brain was carried out with transaxial   SPGR, FLAIR, fast spin echo T2 weighted images, axial susceptibility   weighted series, diffusion weighted series and sagittal T1 weighted   series on a 5 Antonella magnet.    Comparison is made with the prior CT/CTA of 2023 and MRI 2017.    Ventricular and sulcal prominence is consistent with age-appropriate   involutional change. Small vessel white matter ischemic changes are   noted. There is a tiny acute infarct in a high right frontal cortex along   the precentral gyrus, new since 2017. No acute hemorrhage is   identified.    The sellar and parasellar structures are unremarkable. There is mild   mucosal thickening of the ethmoid sinuses.    IMPRESSION: Age-appropriate involutional and ischemic gliotic changes.   Tiny acute infarct right frontal precentral gyrus.    --- End of Report ---            DIANE WEISS MD; Attending Radiologist  This document has been electronically signed. 2023 12:57PM    < end of copied text >    < from: Xray Chest 1 View- PORTABLE-Urgent (Xray Chest 1 View- PORTABLE-Urgent .) (23 @ 13:54) >    ACC: [id number] EXAM:  XR CHEST PORTABLE URGENT 1V   ORDERED BY: TR LOYA     PROCEDURE DATE:  2023          INTERPRETATION:  Chest one view    HISTORY: Tachypnea    COMPARISON STUDY: 2023    Frontal expiratory view of the chest shows the heart to be similarly   enlarged in size. The lungs show clear right lung with small left lower   lobe infiltrate and there is no evidence of pneumothorax nor definite   pleural effusion.    IMPRESSION:  Small left infiltrate.        Thank you for the courtesy of this referral.    --- End of Report ---            FELICIA QUIROZ MD; Attending Interventional Radiologist  This document has been electronically signed. 2023  3:39PM    < end of copied text >   HPI: Pt is a 68y old Male with hx of anemia, Afib, CHF, COPD, cirrhosis, collapsed lung, emphysema  EtOH abuse, EtOH withdrawal, falls, GERD, HTN, meningitis, presence of Watchman left atrial appendage closure device presented with persistent dizziness/ vertigo. Patient reports he has had similar issues. Symptoms started suddenly while at home yesterday while walking, patient sat himself down, denies falls or injuries.  Reported constant dizziness and spinning sensation associated with nausea, no vomiting.  Also reports mild headache and dry mouth. No chest pain. No SOB. No recent sickness. No recent travel. Palliative consulted for [facility].    : Lethargic, unwilling to talk to palliative at this time. Requesting to sleep, agreed to speak with palliative in afternoon. Met with patient at bedside this afternoon. Requested palliative to speak with his daughter, Litzy. Plan for [facility] meeting with patient and daughter at 2:30PM today.      PAIN: ( )Yes   (X)No  denies    DYSPNEA: ( ) Yes  (X) No  on 3LNC, no dyspnea at rest in NAD      PAST MEDICAL & SURGICAL HISTORY:  Chronic atrial fibrillation  Alcohol abuse  Poor historian  CHF (congestive heart failure)  Cirrhosis  Emphysema of lung  Alcohol withdrawal  Collapsed lung  Meningitis  Falls  Anemia  Chronic obstructive pulmonary disease (COPD)  GERD (gastroesophageal reflux disease)  Hypertension  Presence of Watchman left atrial appendage closure device  Atrial fibrillation  COPD without exacerbation  Chronic CHF  S/P BKA (below knee amputation) unilateral, left  Presence of Watchman left atrial appendage closure device  Unilateral amputation of lower extremity below knee  H/O tracheostomy  now removed  S/P percutaneous endoscopic gastrostomy (PEG) tube placement  now removed      SOCIAL HX:    Hx opiate tolerance ( )YES  ( )NO    Baseline ADLs  (Prior to Admission)  (X) Independent   ( )Dependent    FAMILY HISTORY:  No pertinent family history in first degree relatives        Review of Systems:    Anxiety- denies  Depression- denies  Physical Discomfort- denies  Dyspnea- denies  Constipation- denies  Diarrhea- denies  Nausea- denies  Vomiting- denies  Anorexia- denies  Weight Loss- denies  Cough- denies  Secretions- denies  Fatigue- ++  Weakness- denies  Delirium- denies    All other systems reviewed and negative  Unable to obtain/Limited due to:      PHYSICAL EXAM:    Vital Signs Last 24 Hrs  T(C): 36.3 (2023 08:04), Max: 36.4 (2023 21:30)  T(F): 97.3 (2023 08:04), Max: 97.5 (2023 21:30)  HR: 60 (2023 08:04) (57 - 88)  BP: 100/50 (2023 08:04) (100/50 - 117/62)  BP(mean): --  RR: 23 (2023 08:04) (17 - 23)  SpO2: 100% (2023 08:04) (95% - 100%)    Parameters below as of 2023 08:04  Patient On (Oxygen Delivery Method): nasal cannula  O2 Flow (L/min): 3    Daily     Daily Weight in k.6 (2023 05:10)    PPSV2:  30%    General: Awake, alert, pleasant. Chronically-ill appearing. In NAD.  Mental Status: A&Ox4.   HEENT: 3LNC.  Lungs: Diminished throughout all lung fields.  Cardiac: irregular rate and rhythm.  GI: protuberant abdomen.  : No suprapubic tenderness.  Ext: +2 bilateral JAX. Bilateral LE erythematous   Neuro: A&Ox4. Speech intact. No focal neuro deficits.      LABS:                        10.6   6.89  )-----------( 185      ( 2023 06:31 )             34.0     11    134<L>  |  98  |  16  ----------------------------<  109<H>  4.0   |  32<H>  |  0.78    Ca    8.9      2023 06:31        Albumin: Albumin: 2.7 g/dL (-17 @ 23:42)      Allergies  Duricef (Rash)  Ceclor (Rash)    Intolerances      MEDICATIONS  (STANDING):  aspirin enteric coated 81 milliGRAM(s) Oral daily  atorvastatin 80 milliGRAM(s) Oral at bedtime  budesonide 160 MICROgram(s)/formoterol 4.5 MICROgram(s) Inhaler 2 Puff(s) Inhalation two times a day  busPIRone 10 milliGRAM(s) Oral two times a day  clopidogrel Tablet 75 milliGRAM(s) Oral daily  cyanocobalamin 1000 MICROGram(s) Oral daily  diltiazem    milliGRAM(s) Oral daily  doxazosin 1 milliGRAM(s) Oral at bedtime  enoxaparin Injectable 40 milliGRAM(s) SubCutaneous every 24 hours  folic acid 1 milliGRAM(s) Oral daily  furosemide    Tablet 40 milliGRAM(s) Oral daily  gabapentin 400 milliGRAM(s) Oral three times a day  influenza  Vaccine (HIGH DOSE) 0.7 milliLiter(s) IntraMuscular once  metoprolol tartrate 25 milliGRAM(s) Oral two times a day  multivitamin 1 Tablet(s) Oral daily  pantoprazole    Tablet 40 milliGRAM(s) Oral before breakfast  psyllium Powder 2 Packet(s) Oral daily  QUEtiapine 100 milliGRAM(s) Oral at bedtime  spironolactone 50 milliGRAM(s) Oral daily  thiamine 100 milliGRAM(s) Oral daily  tiotropium 2.5 MICROgram(s) Inhaler 2 Puff(s) Inhalation daily  zaleplon 5 milliGRAM(s) Oral at bedtime    MEDICATIONS  (PRN):  acetaminophen     Tablet .. 650 milliGRAM(s) Oral every 6 hours PRN Mild Pain (1 - 3)  albuterol    90 MICROgram(s) HFA Inhaler 2 Puff(s) Inhalation every 6 hours PRN Shortness of Breath and/or Wheezing  benzocaine/menthol Lozenge 1 Lozenge Oral every 6 hours PRN Sore Throat  meclizine 25 milliGRAM(s) Oral every 8 hours PRN Dizziness  ondansetron Injectable 4 milliGRAM(s) IV Push every 6 hours PRN Nausea and/or Vomiting      RADIOLOGY/ADDITIONAL STUDIES:  < from: MR Head No Cont (23 @ 12:45) >    ACC: [id number] EXAM:  MR BRAIN   ORDERED BY: TR LOYA     PROCEDURE DATE:  2023          INTERPRETATION:  CLINICAL INDICATION: Acute onset dizziness, feels foggy      Magnetic resonance imaging of the brain was carried out with transaxial   SPGR, FLAIR, fast spin echo T2 weighted images, axial susceptibility   weighted series, diffusion weighted series and sagittal T1 weighted   series on a 5 Antonella magnet.    Comparison is made with the prior CT/CTA of 2023 and MRI 2017.    Ventricular and sulcal prominence is consistent with age-appropriate   involutional change. Small vessel white matter ischemic changes are   noted. There is a tiny acute infarct in a high right frontal cortex along   the precentral gyrus, new since 2017. No acute hemorrhage is   identified.    The sellar and parasellar structures are unremarkable. There is mild   mucosal thickening of the ethmoid sinuses.    IMPRESSION: Age-appropriate involutional and ischemic gliotic changes.   Tiny acute infarct right frontal precentral gyrus.    --- End of Report ---            DIANE WEISS MD; Attending Radiologist  This document has been electronically signed. 2023 12:57PM    < end of copied text >    < from: Xray Chest 1 View- PORTABLE-Urgent (Xray Chest 1 View- PORTABLE-Urgent .) (11.19.23 @ 13:54) >    ACC: [id number] EXAM:  XR CHEST PORTABLE URGENT 1V   ORDERED BY: TR LOYA     PROCEDURE DATE:  2023          INTERPRETATION:  Chest one view    HISTORY: Tachypnea    COMPARISON STUDY: 2023    Frontal expiratory view of the chest shows the heart to be similarly   enlarged in size. The lungs show clear right lung with small left lower   lobe infiltrate and there is no evidence of pneumothorax nor definite   pleural effusion.    IMPRESSION:  Small left infiltrate.        Thank you for the courtesy of this referral.    --- End of Report ---            FELICIA QUIROZ MD; Attending Interventional Radiologist  This document has been electronically signed. 2023  3:39PM    < end of copied text >

## 2023-11-22 NOTE — PROGRESS NOTE ADULT - PROBLEM SELECTOR PLAN 1
per neurology very small right frontal stroke may be due to cardioembolic causes.  - discussed w/ Dr. Lenz, device associated thrombus or jose device leak  may be a cause for CVA in this pt w/ watchman device.    -Pt s/p MYA 11/20/23 Normal LVF, EF 60-65%. Left atrial occlusion device present with very small jose-device leak. No   evidence of jose-device thrombus, Moderate MR/TR.  Small jose-device leak less likely cause of CVA.  Continue statin, DAPT.  No chronic AC. per neurology very small right frontal stroke may be due to cardioembolic causes.      -Pt s/p YMA 11/20/23 Normal LVF, EF 60-65%. Left atrial occlusion device present with very small jose-device leak. No   evidence of jose-device thrombus, Moderate MR/TR.  Small jose-device leak less likely cause of CVA.  Continue statin, DAPT.  No chronic AC.

## 2023-11-22 NOTE — CHART NOTE - NSCHARTNOTEFT_GEN_A_CORE
HPI: As per medical record,   69 y/o Male with a PMHx of anemia, Afib, CHF, COPD, cirrhosis, collapsed lung, emphysema  EtOH abuse, EtOH withdrawal, falls, GERD, HTN, meningitis, presence of Watchman left atrial appendage closure device presented with persistent dizziness/ vertigo. Patient reports he has had similar issues. Symptoms started suddenly while at home yesterday while walking, patient sat himself down, denies falls or injuries.  Reported constant dizziness and spinning sensation associated with nausea, no vomiting.  Also reports mild headache and dry mouth. No chest pain. No SOB. No recent sickness. No recent travel.  (18 Nov 2023 06:20)      PERTINENT PMH REVIEWED:  [ x ] YES [ ] NO           Primary Contact:     HCP [  ] Surrogate [   ] Guardian [   ]    Mental Status: [  ] Alert  [  ] Oriented [  ] Confused [  ] Lethargic   Concerns of Depression [  ]  Anxiety [   ]  Baseline ADLs (prior to admission):  Independent [ ] moderately [ ] fully   Dependent   [ ] moderately [ ]fully    Family Meeting attendees:    Anticipated Grief: Patient[  ] Family [  ]    Caregiver [place] Assessed: Yes [  ] No [  ]    Baptism:    Spiritual Concerns:    Goals of Care: Comfort [  ] Rehabilitation [  ] Curative [  ] Life Prolonging [  ]    Previous Services:    ADVANCE DIRECTIVES:  [ ] YES [ ] NO   DNR [ ] YES [ ] NO  DNI [ ] YES [ ] NO Completed on:                     MOLST  [ ] YES [ ] NO   Completed on:  Living Will  [ ] YES [ ] NO   Completed on:    Anticipated D/C Plan:                     Summary:    **INCOMPLETE** HPI: As per medical record,   67 y/o Male with a PMHx of anemia, Afib, CHF, COPD, cirrhosis, collapsed lung, emphysema  EtOH abuse, EtOH withdrawal, falls, GERD, HTN, meningitis, presence of Watchman left atrial appendage closure device presented with persistent dizziness/ vertigo. Patient reports he has had similar issues. Symptoms started suddenly while at home yesterday while walking, patient sat himself down, denies falls or injuries.  Reported constant dizziness and spinning sensation associated with nausea, no vomiting.  Also reports mild headache and dry mouth. No chest pain. No SOB. No recent sickness. No recent travel.  (18 Nov 2023 06:20)      PERTINENT PMH REVIEWED:  [ x ] YES [ ] NO           Primary Contact: daughter Litzy Boone ([phone])    HCP [  ] Surrogate [  x ] Guardian [   ]    Mental Status: [ x ] Alert  [ x ] Oriented [  ] Confused [  ] Lethargic   Concerns of Depression [  ] unable to assess - pt falling asleep.   Anxiety [   ] unable to assess - pt falling asleep.   Baseline ADLs (prior to admission):  Independent [ ] moderately [ x ] fully   Dependent   [ ] moderately [ ]fully    Family Meeting attendees: Attempted to meet with pt but pt fell asleep during visit.     Anticipated Grief: Patient[  ] Family [  ]    Caregiver [place] Assessed: Yes [ x ] No [  ]    Jainism: Not identified.     Spiritual Concerns: none reported.  available PRN.     Goals of Care: To be further discussed.     Previous Services: [facility], [facility], Home O2    ADVANCE DIRECTIVES:  [ x ] YES [ ] NO    - MOLST on chart reflecting DNR/DNI with trial NIV    Anticipated D/C Plan: TBD                      Summary: Pall SW & NP attempted to meet with patient at bedside to introduce team and offer support. Palliative role explained. Pt reports feeling tired and started falling asleep during visit. Team will try to meet with pt later. Emotional support provided. Our team will continue to follow.     **INCOMPLETE**

## 2023-11-22 NOTE — CONSULT NOTE ADULT - ASSESSMENT
Pt is a 68y old Male with hx of anemia, Afib, CHF, COPD, cirrhosis, collapsed lung, emphysema  EtOH abuse, EtOH withdrawal, falls, GERD, HTN, meningitis, presence of Watchman left atrial appendage closure device presented with persistent dizziness/ vertigo. Patient reports he has had similar issues. Symptoms started suddenly while at home yesterday while walking, patient sat himself down, denies falls or injuries.  Reported constant dizziness and spinning sensation associated with nausea, no vomiting.  Also reports mild headache and dry mouth. No chest pain. No SOB. No recent sickness. No recent travel.     1. Persistent vertigo  -MRI- Acute CVA- Tiny acute infarct right frontal precentral gyrus  -s/p MYA 11/20/23 Normal LVF, EF 60-65%. Left atrial occlusion device present with very small jose-device leak. No  evidence of jose-device thrombus, Moderate MR/TR  -per cardio & neuro: small device leak unlikely to cause CVA  -neuro checks   -telemetry monitoring  -ASA, Plavix, statin  -cardio following  -neuro following    2. Acute diastolic CHF  -changed to PO Lasix daily   -daily weights, I&O  -BB, spirolactone       Process of Care   --Reviewed dx/treatment problems and alignment with Goals of Care     Physical Aspects of Care   --Pain   patient denies at this time   c/w current management     --Bowel Regimen   denies constipation   risk for constipation d/t immobility   daily dulcolax as needed     --Dyspnea   No SOB at this time, on 3LNC  comfortable and in NAD     --Nausea Vomiting   denies     --Weakness   PT as tolerated        Psychological and Psychiatric Aspects of Care:    --Grief/ Bereavement: emotional support provided   --Hx of psychiatric dx: none   --Pastoral Care Available PRN          Social Aspects of Care   --SW involved          Cultural Aspects   --Primary Language: English           Goals of Care:            We discussed Palliative Care team being a supportive team when a patient has ongoing illnesses.  We also discussed that it is not an end of life care service, but can help navigate symptoms and emotional support throughout their hospital stay here.           Ethical and Legal Aspects: NA           Capacity-          HCP/Surrogate:           Code Status: DNR/DNI trial NIV    MOLST:    Dispo Plan:           Discussed With: Dr. Workman coordinated with attending and SW and RN            Time Spent: 75 minutes including the care, coordination and counseling of this patient, 50% of which was spent coordinating and counseling.  Pt is a 68y old Male with hx of anemia, Afib, CHF, COPD, cirrhosis, collapsed lung, emphysema  EtOH abuse, EtOH withdrawal, falls, GERD, HTN, meningitis, presence of Watchman left atrial appendage closure device presented with persistent dizziness/ vertigo. Patient reports he has had similar issues. Symptoms started suddenly while at home yesterday while walking, patient sat himself down, denies falls or injuries.  Reported constant dizziness and spinning sensation associated with nausea, no vomiting.  Also reports mild headache and dry mouth. No chest pain. No SOB. No recent sickness. No recent travel.     1. Persistent vertigo  -MRI- Acute CVA- Tiny acute infarct right frontal precentral gyrus  -s/p MYA 11/20/23 Normal LVF, EF 60-65%. Left atrial occlusion device present with very small jose-device leak. No  evidence of jose-device thrombus, Moderate MR/TR  -per cardio & neuro: small device leak unlikely to cause CVA  -neuro checks   -telemetry monitoring  -ASA, Plavix, statin  -cardio following  -neuro following    2. Acute diastolic CHF  -changed to PO Lasix daily   -daily weights, I&O  -BB, spirolactone       Process of Care   --Reviewed dx/treatment problems and alignment with Goals of Care     Physical Aspects of Care   --Pain   patient denies at this time   c/w current management     --Bowel Regimen   denies constipation   risk for constipation d/t immobility   daily dulcolax as needed     --Dyspnea   No SOB at this time, on 3LNC  comfortable and in NAD     --Nausea Vomiting   denies     --Weakness   PT as tolerated        Psychological and Psychiatric Aspects of Care:    --Grief/ Bereavement: emotional support provided   --Hx of psychiatric dx: none   --Pastoral Care Available PRN          Social Aspects of Care   --SW involved          Cultural Aspects   --Primary Language: English           Goals of Care:  GOC meeting at 2:30PM today with patient and daughter.          We discussed Palliative Care team being a supportive team when a patient has ongoing illnesses.  We also discussed that it is not an end of life care service, but can help navigate symptoms and emotional support throughout their hospital stay here.           Ethical and Legal Aspects: NA           Capacity- A&Ox4, has medical decision making capacity         HCP/Surrogate: Litzy Boone, daughter [phone]          Code Status: DNR/DNI trial NIV    MOLST: MOLST with limitations of DNR/DNI trial NIV on chart    Dispo Plan: [facility] vs. [place] [facility]          Discussed With: Dr. Workman coordinated with attending and SW and RN            Time Spent: 75 minutes including the care, coordination and counseling of this patient, 50% of which was spent coordinating and counseling.  Pt is a 68y old Male with hx of anemia, Afib, CHF, COPD, cirrhosis, collapsed lung, emphysema  EtOH abuse, EtOH withdrawal, falls, GERD, HTN, meningitis, presence of Watchman left atrial appendage closure device presented with persistent dizziness/ vertigo. Patient reports he has had similar issues. Symptoms started suddenly while at home yesterday while walking, patient sat himself down, denies falls or injuries.  Reported constant dizziness and spinning sensation associated with nausea, no vomiting.  Also reports mild headache and dry mouth. No chest pain. No SOB. No recent sickness. No recent travel.     1. Persistent vertigo  -MRI- Acute CVA- Tiny acute infarct right frontal precentral gyrus  -s/p MYA 11/20/23 Normal LVF, EF 60-65%. Left atrial occlusion device present with very small jose-device leak. No  evidence of jose-device thrombus, Moderate MR/TR  -per cardio & neuro: small device leak unlikely to cause CVA  -neuro checks   -telemetry monitoring  -ASA, Plavix, statin  -cardio following  -neuro following    2. Acute diastolic CHF  -changed to PO Lasix daily   -daily weights, I&O  -BB, spirolactone       Process of Care   --Reviewed dx/treatment problems and alignment with Goals of Care     Physical Aspects of Care   --Pain   patient denies at this time   c/w current management     --Bowel Regimen   denies constipation   risk for constipation d/t immobility   daily dulcolax as needed     --Dyspnea   No SOB at this time, on 3LNC  comfortable and in NAD     --Nausea Vomiting   denies     --Weakness   PT as tolerated        Psychological and Psychiatric Aspects of Care:    --Grief/ Bereavement: emotional support provided   --Hx of psychiatric dx: none   --Pastoral Care Available PRN          Social Aspects of Care   --SW involved          Cultural Aspects   --Primary Language: English           Goals of Care:  Discussed patient's chronic, progressive illnesses & option for hospice care in future if patient reaches end-stage. MOLST with limitations of DNR/DNI trial NIV in place. Not ready for hospice at this point.            We discussed Palliative Care team being a supportive team when a patient has ongoing illnesses.  We also discussed that it is not an end of life care service, but can help navigate symptoms and emotional support throughout their hospital stay here.           Ethical and Legal Aspects: NA           Capacity- A&Ox4, has medical decision making capacity         HCP/Surrogate: Litzy Boone, daughter [phone]          Code Status: DNR/DNI trial NIV    MOLST: MOLST with limitations of DNR/DNI trial NIV on chart    Dispo Plan: [facility] vs. [place] [facility]          Discussed With: Dr. Workman coordinated with attending and SW and RN            Time Spent: 75 minutes including the care, coordination and counseling of this patient, 50% of which was spent coordinating and counseling.

## 2023-11-22 NOTE — PROGRESS NOTE ADULT - PROBLEM SELECTOR PLAN 2
-mild fluid overload on exam   -h/o recurrent CHF exacerbations for HFpEF.  -lasix changed to PO today.  Continue BB and spironalactone.  Please perform daily standing weights
-mild fluid overload on exam   -h/o recurrent CHF exacerbations for HFpEF.    -agree w/ lasix 40 IV BID reassess tommorrow.
-mild fluid overload on exam   -BNP elevated on admission 4176, now downtrending 3031  -h/o recurrent CHF exacerbations for HFpEF.  -continue lasix 40 IV BID.  Please perform daily standing weights

## 2023-11-22 NOTE — PROGRESS NOTE ADULT - SUBJECTIVE AND OBJECTIVE BOX
Subjective: overall feels better, more awake and alert sitting in chair , cough improved , sob improving ,           Vital Signs Last 24 Hrs  T(C): 36.3 (22 Nov 2023 08:04), Max: 36.4 (21 Nov 2023 21:30)  T(F): 97.3 (22 Nov 2023 08:04), Max: 97.5 (21 Nov 2023 21:30)  HR: 60 (22 Nov 2023 08:04) (57 - 88)  BP: 100/50 (22 Nov 2023 08:04) (100/50 - 117/62)  BP(mean): --  RR: 23 (22 Nov 2023 08:04) (17 - 23)  SpO2: 100% (22 Nov 2023 08:04) (95% - 100%)    Parameters below as of 22 Nov 2023 08:04  Patient On (Oxygen Delivery Method): nasal cannula  O2 Flow (L/min): 3      Physical exam   HEENT:   pupils equal and reactive, EOMI, no oropharyngeal lesions, erythema, exudates, oral thrush    NECK:   supple, no carotid bruits, no palpable lymph nodes, no thyromegaly    CV:  +S1, +S2, regular, no murmurs or rubs    RESP:  crackles and rhonchi b/l lung feilds improving air entry    BREAST:  not examined    GI:  abdomen soft, non-tender, non-distended, normal BS, no bruits,     RECTAL:  not examined  :  not examined    MSK:   normal muscle tone, no atrophy, no rigidity, no contractions    EXT:   no clubbing, no cyanosis, no edema, no calf pain, swelling or erythema    VASCULAR:  pulses equal and symmetric in the upper and lower extremities    NEURO:  AAOX3, no focal neurological deficits, follows all commands, able to move extremities spontaneously    SKIN:  no ulcers, lesions or rashes                              10.6   6.89  )-----------( 185      ( 22 Nov 2023 06:31 )             34.0   11-22    134<L>  |  98  |  16  ----------------------------<  109<H>  4.0   |  32<H>  |  0.78    Ca    8.9      22 Nov 2023 06:31          MEDICATIONS  (STANDING):  atorvastatin 80 milliGRAM(s) Oral at bedtime  budesonide 160 MICROgram(s)/formoterol 4.5 MICROgram(s) Inhaler 2 Puff(s) Inhalation two times a day  busPIRone 10 milliGRAM(s) Oral two times a day  clopidogrel Tablet 75 milliGRAM(s) Oral daily  cyanocobalamin 1000 MICROGram(s) Oral daily  diltiazem    milliGRAM(s) Oral daily  doxazosin 1 milliGRAM(s) Oral at bedtime  folic acid 1 milliGRAM(s) Oral daily  furosemide   Injectable 40 milliGRAM(s) IV Push two times a day  gabapentin 400 milliGRAM(s) Oral three times a day  influenza  Vaccine (HIGH DOSE) 0.7 milliLiter(s) IntraMuscular once  metoprolol tartrate 25 milliGRAM(s) Oral two times a day  multivitamin 1 Tablet(s) Oral daily  pantoprazole    Tablet 40 milliGRAM(s) Oral before breakfast  psyllium Powder 2 Packet(s) Oral daily  QUEtiapine 100 milliGRAM(s) Oral at bedtime  spironolactone 50 milliGRAM(s) Oral daily  thiamine 100 milliGRAM(s) Oral daily  tiotropium 2.5 MICROgram(s) Inhaler 2 Puff(s) Inhalation daily  zaleplon 5 milliGRAM(s) Oral at bedtime      < from: MR Head No Cont (11.18.23 @ 12:45) > personally reviewed  IMPRESSION: Age-appropriate involutional and ischemic gliotic changes.   Tiny acute infarct right frontal precentral gyrus.

## 2023-11-22 NOTE — PROGRESS NOTE ADULT - SUBJECTIVE AND OBJECTIVE BOX
Subjective:   patient seen and examined, no acute events    Denies cough, expectoration, hemoptysis, pleuritic chest pain.    ROS:  Gen: Denies fever, chills, rigors  HEENT: Denies neck swelling, difficulty swallowing, nasal congestion  PULM: As above  Cardiology: Denies palpitation, dizziness, dyspnea    PHYSICAL EXAMINATION:    GENERAL APPEARANCE:  Pt. is not currently dyspneic, in no distress. Pt. is alert, oriented, and pleasant.  HEENT:  Pupils are normal and react normally. No icterus. Mucous membranes well colored.  NECK:  Supple. No lymphadenopathy. Jugular venous pressure not elevated. Carotids equal.   HEART:   S1S2 reg  CHEST:  decrease bs left 1/4 and right basilar crackles  ABDOMEN:  Soft and nontender.   EXTREMITIES:  left bka; right leg edema  SKIN:  No rash or significant lesions are noted.        Vital Signs Last 24 Hrs  T(C): 36.3 (22 Nov 2023 08:04), Max: 36.4 (21 Nov 2023 21:30)  T(F): 97.3 (22 Nov 2023 08:04), Max: 97.5 (21 Nov 2023 21:30)  HR: 60 (22 Nov 2023 08:04) (57 - 88)  BP: 100/50 (22 Nov 2023 08:04) (100/50 - 117/62)  BP(mean): --  RR: 23 (22 Nov 2023 08:04) (17 - 23)  SpO2: 100% (22 Nov 2023 08:04) (95% - 100%)    Parameters below as of 22 Nov 2023 09:10  Patient On (Oxygen Delivery Method): nasal cannula        MEDICATIONS  (STANDING):  aspirin enteric coated 81 milliGRAM(s) Oral daily  atorvastatin 80 milliGRAM(s) Oral at bedtime  budesonide 160 MICROgram(s)/formoterol 4.5 MICROgram(s) Inhaler 2 Puff(s) Inhalation two times a day  busPIRone 10 milliGRAM(s) Oral two times a day  clopidogrel Tablet 75 milliGRAM(s) Oral daily  cyanocobalamin 1000 MICROGram(s) Oral daily  diltiazem    milliGRAM(s) Oral daily  doxazosin 1 milliGRAM(s) Oral at bedtime  enoxaparin Injectable 40 milliGRAM(s) SubCutaneous every 24 hours  folic acid 1 milliGRAM(s) Oral daily  furosemide    Tablet 40 milliGRAM(s) Oral daily  gabapentin 400 milliGRAM(s) Oral three times a day  influenza  Vaccine (HIGH DOSE) 0.7 milliLiter(s) IntraMuscular once  metoprolol tartrate 25 milliGRAM(s) Oral two times a day  multivitamin 1 Tablet(s) Oral daily  pantoprazole    Tablet 40 milliGRAM(s) Oral before breakfast  psyllium Powder 2 Packet(s) Oral daily  QUEtiapine 100 milliGRAM(s) Oral at bedtime  spironolactone 50 milliGRAM(s) Oral daily  thiamine 100 milliGRAM(s) Oral daily  tiotropium 2.5 MICROgram(s) Inhaler 2 Puff(s) Inhalation daily  zaleplon 5 milliGRAM(s) Oral at bedtime    MEDICATIONS  (PRN):  acetaminophen     Tablet .. 650 milliGRAM(s) Oral every 6 hours PRN Mild Pain (1 - 3)  albuterol    90 MICROgram(s) HFA Inhaler 2 Puff(s) Inhalation every 6 hours PRN Shortness of Breath and/or Wheezing  benzocaine/menthol Lozenge 1 Lozenge Oral every 6 hours PRN Sore Throat  meclizine 25 milliGRAM(s) Oral every 8 hours PRN Dizziness  ondansetron Injectable 4 milliGRAM(s) IV Push every 6 hours PRN Nausea and/or Vomiting      Allergies    Duricef (Rash)  Ceclor (Rash)    Intolerances      LABS:                        10.6   6.89  )-----------( 185      ( 22 Nov 2023 06:31 )             34.0     11-22    134<L>  |  98  |  16  ----------------------------<  109<H>  4.0   |  32<H>  |  0.78    Ca    8.9      22 Nov 2023 06:31          RADIOLOGY & ADDITIONAL TESTS:  Assessment and Recommendation:   · Assessment    - cont O2  - has normal EF w moderate pulmonary htn  - can cont MDI's  - cxr shows small left effussion w pulmonary congestion; cont diuresis  - has refused PSG in past, open to study again. I will order it through the outpatient system. likely has obesity hypoventilation syndrome.   - cont lovenox-  - has cva in setting of afib w watchmen's procedure      Problem/Recommendation - 1:  ·  Problem: Chronic respiratory failure with hypoxia and hypercapnia.     Problem/Recommendation - 2:  ·  Problem: Obesity hypoventilation syndrome.     Problem/Recommendation - 3:  ·  Problem: Moderate pulmonary hypertension.     Problem/Recommendation - 4:  ·  Problem: Acute on chronic heart failure with preserved ejection fraction (HFpEF).     Problem/Recommendation - 5:  ·  Problem: CVA (cerebrovascular accident).     Problem/Recommendation - 6:  ·  Problem: Permanent atrial fibrillation.

## 2023-11-22 NOTE — CONSULT NOTE ADULT - PROBLEM SELECTOR PROBLEM 1
CVA (cerebrovascular accident)
CVA (cerebrovascular accident)
Chronic respiratory failure with hypoxia and hypercapnia

## 2023-11-22 NOTE — PROGRESS NOTE ADULT - ASSESSMENT
67 y/o Male with a PMHx of anemia, Afib, CHF, COPD, cirrhosis, collapsed lung, emphysema  EtOH abuse, EtOH withdrawal, falls, GERD, HTN, meningitis, presence of Watchman left atrial appendage closure device presented with persistent dizziness/ vertigo. Patient reports he has had similar issues. Symptoms started suddenly while at home yesterday while walking, patient sat himself down, denies falls or injuries.  Reported constant dizziness and spinning sensation associated with nausea, no vomiting.  Also reports mild headache and dry mouth. No chest pain. No SOB. No recent sickness. No recent travel.       * Persistent Dizziness  Persistent Vertigo  -MRI- Acute CVA- Tiny acute infarct right frontal precentral gyrus.  . s/p MYA 11/20/23 Normal LVF, EF 60-65%. Left atrial occlusion device present with very small jose-device leak. No   evidence of jose-device thrombus, Moderate MR/TR.  very small  jose device leak - not likely to cause CVA per cardio and neuro- dw with Dr mcrae  . continue aspirin + Plavix   -Continue statin  -Outpatient sleep study for possible VIJAY-Neuro consult appreciated   Echo- left atrium severely dilated , pulm HTN, EF 60%   -cardio  and neuro input appreciated-  no indication for AC as pt has watchman's device ,  , stopped lovenox and started DAPT     *persistent  afib,   has watchmans device - no AC    metoprolol, diltiazem for rate control.    * right ICA  stenosis  -CTA shows 60% stenosis  -u/s shows no sig stenosis of either ICA  -vascular consult appreciated        * Acute on Chronic Diastolic CHF  change to PO Lasix      COPD  -stable  -continue Albuterol, Symbicort and Spiriva        Code status: Full code     check ortho

## 2023-11-22 NOTE — PROGRESS NOTE ADULT - PROBLEM SELECTOR PLAN 3
rate controlled; s/p watchman implanted Apr '19 at [facility].  indication- CHADS2-VASC>2, AC contraindicated due to alcohol use disorder and frequent falls.    -cont. metoprolol, diltiazem for rate control.  -S/P MYA which showed Left atrial occlusion device present with small jose-device leak. No   evidence of jose-device thrombus, Moderate MR/TR.  Continue DAPT.  -Recommend outpt sleep study to eval for VIJAY    Will sign off.  Please call with any questions.
rate controlled; s/p watchman implanted Apr '19 at [facility].  indication- CHADS2-VASC>2, AC contraindicated due to alcohol use disorder and frequent falls.    -cont. metoprolol, diltiazem for rate control.  -S/P MYA which showed Left atrial occlusion device present with small jose-device leak. No   evidence of jose-device thrombus, Moderate MR/TR.
rate controlled; s/p watchman implanted Apr '19 at [facility].  indication- CHADS2-VASC>2, AC contraindicated due to alcohol use disorder and frequent falls.    -cont. metoprolol, diltiazem for rate control.  -Start lovenox as noted above given CVA w/ possible cardioembolic causes  due to device associated thrombus.

## 2023-11-22 NOTE — CONSULT NOTE ADULT - CONVERSATION DETAILS
Met with patient at bedside alongside Ivone Rivas LMSW. Explained role of palliative medicine.    Per patient, he has a HCP naming his ex-wife. Patient confirmed MOLST with limitations of DNR/DNI trial NIV. Patient requested us to speak with his daughter, Litzy.     Spoke with patient's daughter, Litzy, via phone. Per Litzy, she is the HCP and patient's ex-wife is POA. Daughter explaining that patient has been residing at [facility] in independent living. She stated that he is "mentally sharp" but has seen him physically decline in past few years. She explained that it has been hard to watch him physically decline, and stated that he has no quality of life. She inquired about hospice services. Briefly explained philosophy of hospice, explained that patient may not qualify for hospice services at this time- but if patient willing and interested in hospice- a referral could be sent to see if he is accepted. Explained that at some point, considering patient's chronic, progressive illnesses- he will be an appropriate hospice candidate if patient willing to choose this route of care. Plan to meet with daughter and patient at bedside today at 2:30PM to discuss further.     MOLST with DNR/DNI trial NIV confirmed and in patient chart. Palliative will continue to follow. Met with patient at bedside alongside Ivone Rivas LMSW. Explained role of palliative medicine.    Per patient, he has a HCP naming his ex-wife. Patient confirmed MOLST with limitations of DNR/DNI trial NIV. Patient requested us to speak with his daughter, Litzy.     Spoke with patient's daughter, Litzy, via phone. Per Litzy, she is the HCP and patient's ex-wife is POA. Daughter explaining that patient has been residing at [facility] in independent living. She stated that he is "mentally sharp" but has seen him physically decline in past few years. She explained that it has been hard to watch him physically decline, and stated that he has no quality of life. She inquired about hospice services. Briefly explained philosophy of hospice, explained that patient may not qualify for hospice services at this time- but if patient willing and interested in hospice- a referral could be sent to see if he is accepted. Explained that at some point, considering patient's chronic, progressive illnesses- he would be an appropriate hospice candidate if patient willing to choose this route of care. Plan to meet with daughter and patient at bedside today at 2:30PM to discuss further.     MOLST with DNR/DNI trial NIV confirmed and in patient chart. Palliative will continue to follow.      Met with patient, daughter, Litzy, at bedside alongside Ivone Rivas LMSW and patient's other daughter and ex-wife via phone. Explained role of palliative medicine.    Prior to admission, patient has been residing at [facility]. Family explained his multiple hospitalizations starting in 2015. Daughter feels that her dad has been suffering over the past few years, and is concerned of his quality of life. Patient explained that he feels as though he is not suffering, and has good quality of life. He is happy with the care he receives from his many doctors Met with patient at bedside alongside Ivone Rivas LMSW. Explained role of palliative medicine.    Per patient, he has a HCP naming his ex-wife. Patient confirmed MOLST with limitations of DNR/DNI trial NIV. Patient requested us to speak with his daughter, Litzy.     Spoke with patient's daughter, Litzy, via phone. Per Litzy, she is the HCP and patient's ex-wife is POA. Daughter explaining that patient has been residing at [facility] in independent living. She stated that he is "mentally sharp" but has seen him physically decline in past few years. She explained that it has been hard to watch him physically decline, and stated that he has no quality of life. She inquired about hospice services. Briefly explained philosophy of hospice, explained that patient may not qualify for hospice services at this time- but if patient willing and interested in hospice- a referral could be sent to see if he is accepted. Explained that at some point, considering patient's chronic, progressive illnesses- he would be an appropriate hospice candidate if patient willing to choose this route of care. Plan to meet with daughter and patient at bedside today at 2:30PM to discuss further.     MOLST with DNR/DNI trial NIV confirmed and in patient chart. Palliative will continue to follow.      Met with patient, daughter, Litzy, at bedside alongside Ivone Rivas LMSW and patient's other daughter and ex-wife via phone. Explained role of palliative medicine.    Prior to admission, patient has been residing at [facility]. Family explained his multiple hospitalizations starting in 2015. Daughter feels that her dad has been suffering over the past few years, and is concerned of his quality of life. Patient explained that he feels as though he is not suffering, and has good quality of life. He is happy with the care he receives from his many doctors and this hospital. Patient is willing to proceed with future hospitalizations and whatever treatment options offered. Introduced hospice as an option of care if patient to decline in the future. Patient not ready for hospice at this time, but understands that his illnesses are chronic and progressive and hospice may be appropriate in the future. Our team's contact information was provided to patient. Met with patient at bedside alongside Ivone Rivas LMSW. Explained role of palliative medicine.    Per patient, he has a HCP naming his ex-wife. Patient confirmed MOLST with limitations of DNR/DNI trial NIV. Patient requested us to speak with his daughter, Litzy.     Spoke with patient's daughter, Litzy, via phone. Per Litzy, she is the HCP and patient's ex-wife is POA. Daughter explaining that patient has been residing at [facility] in independent living. She stated that he is "mentally sharp" but has seen him physically decline in past few years. She explained that it has been hard to watch him physically decline, and stated that he has no quality of life. She inquired about hospice services. Briefly explained philosophy of hospice, explained that patient may not qualify for hospice services at this time- but if patient willing and interested in hospice- a referral could be sent to see if he is accepted. Explained that at some point, considering patient's chronic, progressive illnesses- he would be an appropriate hospice candidate if patient willing to choose this route of care. Plan to meet with daughter and patient at bedside today at 2:30PM to discuss further.     MOLST with DNR/DNI trial NIV confirmed and in patient chart. Palliative will continue to follow.      Met with patient, daughter, Litzy, at bedside alongside Ivone Rivas LMSW and patient's other daughter and ex-wife via phone. Explained role of palliative medicine.    Prior to admission, patient has been residing at [facility]. Family explained his multiple hospitalizations starting in 2015. Daughter feels that her dad has been suffering over the past few years, and is concerned of his quality of life. Patient explained that he feels as though he is not suffering, and has good quality of life. He is happy with the care he receives from his many doctors and this hospital. Patient is willing to proceed with future hospitalizations and whatever treatment options offered. Introduced hospice as an option of care in the future. Patient not ready for hospice at this time, but understands that his illnesses are chronic and progressive and hospice may be appropriate in the future. Our team's contact information was provided to patient.

## 2023-11-23 PROCEDURE — 99232 SBSQ HOSP IP/OBS MODERATE 35: CPT

## 2023-11-23 RX ADMIN — SPIRONOLACTONE 50 MILLIGRAM(S): 25 TABLET, FILM COATED ORAL at 10:22

## 2023-11-23 RX ADMIN — Medication 10 MILLIGRAM(S): at 21:40

## 2023-11-23 RX ADMIN — Medication 25 MILLIGRAM(S): at 21:41

## 2023-11-23 RX ADMIN — Medication 1 MILLIGRAM(S): at 10:22

## 2023-11-23 RX ADMIN — Medication 40 MILLIGRAM(S): at 10:22

## 2023-11-23 RX ADMIN — PANTOPRAZOLE SODIUM 40 MILLIGRAM(S): 20 TABLET, DELAYED RELEASE ORAL at 06:03

## 2023-11-23 RX ADMIN — BUDESONIDE AND FORMOTEROL FUMARATE DIHYDRATE 2 PUFF(S): 160; 4.5 AEROSOL RESPIRATORY (INHALATION) at 09:54

## 2023-11-23 RX ADMIN — Medication 650 MILLIGRAM(S): at 21:41

## 2023-11-23 RX ADMIN — Medication 1 TABLET(S): at 10:21

## 2023-11-23 RX ADMIN — GABAPENTIN 400 MILLIGRAM(S): 400 CAPSULE ORAL at 21:41

## 2023-11-23 RX ADMIN — BUDESONIDE AND FORMOTEROL FUMARATE DIHYDRATE 2 PUFF(S): 160; 4.5 AEROSOL RESPIRATORY (INHALATION) at 21:12

## 2023-11-23 RX ADMIN — Medication 81 MILLIGRAM(S): at 10:21

## 2023-11-23 RX ADMIN — Medication 5 MILLIGRAM(S): at 21:41

## 2023-11-23 RX ADMIN — Medication 25 MILLIGRAM(S): at 10:22

## 2023-11-23 RX ADMIN — Medication 650 MILLIGRAM(S): at 10:22

## 2023-11-23 RX ADMIN — Medication 10 MILLIGRAM(S): at 10:22

## 2023-11-23 RX ADMIN — Medication 100 MILLIGRAM(S): at 10:22

## 2023-11-23 RX ADMIN — QUETIAPINE FUMARATE 100 MILLIGRAM(S): 200 TABLET, FILM COATED ORAL at 21:40

## 2023-11-23 RX ADMIN — ENOXAPARIN SODIUM 40 MILLIGRAM(S): 100 INJECTION SUBCUTANEOUS at 06:03

## 2023-11-23 RX ADMIN — Medication 180 MILLIGRAM(S): at 10:21

## 2023-11-23 RX ADMIN — GABAPENTIN 400 MILLIGRAM(S): 400 CAPSULE ORAL at 06:03

## 2023-11-23 RX ADMIN — CLOPIDOGREL BISULFATE 75 MILLIGRAM(S): 75 TABLET, FILM COATED ORAL at 10:21

## 2023-11-23 RX ADMIN — ALBUTEROL 2 PUFF(S): 90 AEROSOL, METERED ORAL at 09:55

## 2023-11-23 RX ADMIN — Medication 25 MILLIGRAM(S): at 10:21

## 2023-11-23 RX ADMIN — Medication 1 MILLIGRAM(S): at 21:40

## 2023-11-23 RX ADMIN — TIOTROPIUM BROMIDE 2 PUFF(S): 18 CAPSULE ORAL; RESPIRATORY (INHALATION) at 09:55

## 2023-11-23 RX ADMIN — ATORVASTATIN CALCIUM 80 MILLIGRAM(S): 80 TABLET, FILM COATED ORAL at 21:40

## 2023-11-23 RX ADMIN — GABAPENTIN 400 MILLIGRAM(S): 400 CAPSULE ORAL at 14:58

## 2023-11-23 RX ADMIN — PREGABALIN 1000 MICROGRAM(S): 225 CAPSULE ORAL at 10:21

## 2023-11-23 NOTE — PROGRESS NOTE ADULT - SUBJECTIVE AND OBJECTIVE BOX
Subjective:   patient seen and examined,     Denies cough, expectoration, hemoptysis, pleuritic chest pain.    ROS:  reviewed unchaged    Vital signs: reviewed for the last 24 hours.     GENERAL APPEARANCE:  Pt. is not currently dyspneic, in no distress. Pt. is alert, oriented, and pleasant.  HEENT:   No icterus. Mucous membranes moist,   NECK:  Supple.  Jugular venous pressure not elevated.   HEART:    Regular. Normal S1 and S2. There are no murmurs, rubs or gallops appreciated  CHEST:  Chest is clear to auscultation. Normal respiratory effort.   EXTREMITIES:  There is no cyanosis, clubbing       ROS:  reviewed unchanged    Vital signs: reviewed for the last 24 hours.   Medication allergies and labs reviewed.     Assessment and Recommendation:   · Assessment    - cont O2  - has normal EF w moderate pulmonary htn  - can cont MDI's  - cxr shows small left effussion w pulmonary congestion; cont diuresis  - I ordered a repeat study  - cont Lovenox  will sign off. please call with questions.       Problem/Recommendation - 1:  ·  Problem: Chronic respiratory failure with hypoxia and hypercapnia.     Problem/Recommendation - 2:  ·  Problem: Obesity hypoventilation syndrome.     Problem/Recommendation - 3:  ·  Problem: Moderate pulmonary hypertension.     Problem/Recommendation - 4:  ·  Problem: Acute on chronic heart failure with preserved ejection fraction (HFpEF).     Problem/Recommendation - 5:  ·  Problem: CVA (cerebrovascular accident).     Problem/Recommendation - 6:  ·  Problem: Permanent atrial fibrillation.

## 2023-11-23 NOTE — PROGRESS NOTE ADULT - SUBJECTIVE AND OBJECTIVE BOX
Subjective: overall feels better, more awake and alert sitting in chair , cough improved , sob improving ,           Vital Signs Last 24 Hrs  T(C): 36.6 (23 Nov 2023 08:02), Max: 36.8 (22 Nov 2023 20:00)  T(F): 97.9 (23 Nov 2023 08:02), Max: 98.2 (22 Nov 2023 20:00)  HR: 86 (23 Nov 2023 08:02) (56 - 86)  BP: 117/90 (23 Nov 2023 08:02) (114/57 - 121/70)  BP(mean): --  RR: 18 (23 Nov 2023 08:02) (18 - 22)  SpO2: 93% (23 Nov 2023 08:02) (93% - 100%)    Parameters below as of 23 Nov 2023 08:02  Patient On (Oxygen Delivery Method): nasal cannula  O2 Flow (L/min): 5        Physical exam   HEENT:   pupils equal and reactive, EOMI, no oropharyngeal lesions, erythema, exudates, oral thrush    NECK:   supple, no carotid bruits, no palpable lymph nodes, no thyromegaly    CV:  +S1, +S2, regular, no murmurs or rubs    RESP:  crackles and rhonchi b/l lung feilds improving air entry    BREAST:  not examined    GI:  abdomen soft, non-tender, non-distended, normal BS, no bruits,     RECTAL:  not examined  :  not examined    MSK:   normal muscle tone, no atrophy, no rigidity, no contractions    EXT:   no clubbing, no cyanosis, no edema, no calf pain, swelling or erythema    VASCULAR:  pulses equal and symmetric in the upper and lower extremities    NEURO:  AAOX3, no focal neurological deficits, follows all commands, able to move extremities spontaneously    SKIN:  no ulcers, lesions or rashes                              10.6   6.89  )-----------( 185      ( 22 Nov 2023 06:31 )             34.0   11-22    134<L>  |  98  |  16  ----------------------------<  109<H>  4.0   |  32<H>  |  0.78    Ca    8.9      22 Nov 2023 06:31          MEDICATIONS  (STANDING):  atorvastatin 80 milliGRAM(s) Oral at bedtime  budesonide 160 MICROgram(s)/formoterol 4.5 MICROgram(s) Inhaler 2 Puff(s) Inhalation two times a day  busPIRone 10 milliGRAM(s) Oral two times a day  clopidogrel Tablet 75 milliGRAM(s) Oral daily  cyanocobalamin 1000 MICROGram(s) Oral daily  diltiazem    milliGRAM(s) Oral daily  doxazosin 1 milliGRAM(s) Oral at bedtime  folic acid 1 milliGRAM(s) Oral daily  furosemide   Injectable 40 milliGRAM(s) IV Push two times a day  gabapentin 400 milliGRAM(s) Oral three times a day  influenza  Vaccine (HIGH DOSE) 0.7 milliLiter(s) IntraMuscular once  metoprolol tartrate 25 milliGRAM(s) Oral two times a day  multivitamin 1 Tablet(s) Oral daily  pantoprazole    Tablet 40 milliGRAM(s) Oral before breakfast  psyllium Powder 2 Packet(s) Oral daily  QUEtiapine 100 milliGRAM(s) Oral at bedtime  spironolactone 50 milliGRAM(s) Oral daily  thiamine 100 milliGRAM(s) Oral daily  tiotropium 2.5 MICROgram(s) Inhaler 2 Puff(s) Inhalation daily  zaleplon 5 milliGRAM(s) Oral at bedtime      < from: MR Head No Cont (11.18.23 @ 12:45) > personally reviewed  IMPRESSION: Age-appropriate involutional and ischemic gliotic changes.   Tiny acute infarct right frontal precentral gyrus.

## 2023-11-23 NOTE — PROGRESS NOTE ADULT - ASSESSMENT
67 y/o Male with a PMHx of anemia, Afib, CHF, COPD, cirrhosis, collapsed lung, emphysema  EtOH abuse, EtOH withdrawal, falls, GERD, HTN, meningitis, presence of Watchman left atrial appendage closure device presented with persistent dizziness/ vertigo. Patient reports he has had similar issues. Symptoms started suddenly while at home yesterday while walking, patient sat himself down, denies falls or injuries.  Reported constant dizziness and spinning sensation associated with nausea, no vomiting.  Also reports mild headache and dry mouth. No chest pain. No SOB. No recent sickness. No recent travel.       * Persistent Dizziness  Persistent Vertigo  -MRI- Acute CVA- Tiny acute infarct right frontal precentral gyrus.  . s/p MYA 11/20/23 Normal LVF, EF 60-65%. Left atrial occlusion device present with very small jose-device leak. No   evidence of jose-device thrombus, Moderate MR/TR.  very small  jose device leak - not likely to cause CVA per cardio and neuro- dw with Dr mcrae  . continue aspirin + Plavix   -Continue statin  -Outpatient sleep study for possible VIJAY-Neuro consult appreciated   Echo- left atrium severely dilated , pulm HTN, EF 60%   -cardio  and neuro input appreciated-  no indication for AC as pt has watchman's device ,  , stopped lovenox and started DAPT     *persistent  afib,   has watchmans device - no AC    metoprolol, diltiazem for rate control.    * right ICA  stenosis  -CTA shows 60% stenosis  -u/s shows no sig stenosis of either ICA  -vascular consult appreciated        * Acute on Chronic Diastolic CHF  change to PO Lasix      COPD  -stable  -continue Albuterol, Symbicort and Spiriva        Code status: Full code     neg  orthostatics

## 2023-11-24 LAB
BASE EXCESS BLDV CALC-SCNC: 4.3 MMOL/L — HIGH (ref -2–3)
BASE EXCESS BLDV CALC-SCNC: 4.3 MMOL/L — HIGH (ref -2–3)
GAS PNL BLDV: SIGNIFICANT CHANGE UP
GAS PNL BLDV: SIGNIFICANT CHANGE UP
HCO3 BLDV-SCNC: 33 MMOL/L — HIGH (ref 22–29)
HCO3 BLDV-SCNC: 33 MMOL/L — HIGH (ref 22–29)
NT-PROBNP SERPL-SCNC: 913 PG/ML — HIGH (ref 0–125)
NT-PROBNP SERPL-SCNC: 913 PG/ML — HIGH (ref 0–125)
PCO2 BLDV: 71 MMHG — HIGH (ref 42–55)
PCO2 BLDV: 71 MMHG — HIGH (ref 42–55)
PH BLDV: 7.28 — LOW (ref 7.32–7.43)
PH BLDV: 7.28 — LOW (ref 7.32–7.43)
PO2 BLDV: 49 MMHG — HIGH (ref 25–45)
PO2 BLDV: 49 MMHG — HIGH (ref 25–45)
SAO2 % BLDV: 76 % — SIGNIFICANT CHANGE UP (ref 67–88)
SAO2 % BLDV: 76 % — SIGNIFICANT CHANGE UP (ref 67–88)

## 2023-11-24 PROCEDURE — 99233 SBSQ HOSP IP/OBS HIGH 50: CPT

## 2023-11-24 PROCEDURE — 71250 CT THORAX DX C-: CPT | Mod: 26

## 2023-11-24 RX ADMIN — SPIRONOLACTONE 50 MILLIGRAM(S): 25 TABLET, FILM COATED ORAL at 10:35

## 2023-11-24 RX ADMIN — ENOXAPARIN SODIUM 40 MILLIGRAM(S): 100 INJECTION SUBCUTANEOUS at 06:04

## 2023-11-24 RX ADMIN — QUETIAPINE FUMARATE 100 MILLIGRAM(S): 200 TABLET, FILM COATED ORAL at 23:10

## 2023-11-24 RX ADMIN — Medication 1 MILLIGRAM(S): at 10:35

## 2023-11-24 RX ADMIN — Medication 81 MILLIGRAM(S): at 10:33

## 2023-11-24 RX ADMIN — Medication 25 MILLIGRAM(S): at 23:07

## 2023-11-24 RX ADMIN — Medication 1 TABLET(S): at 10:36

## 2023-11-24 RX ADMIN — Medication 650 MILLIGRAM(S): at 20:26

## 2023-11-24 RX ADMIN — Medication 40 MILLIGRAM(S): at 23:08

## 2023-11-24 RX ADMIN — Medication 5 MILLIGRAM(S): at 23:07

## 2023-11-24 RX ADMIN — Medication 10 MILLIGRAM(S): at 23:09

## 2023-11-24 RX ADMIN — BUDESONIDE AND FORMOTEROL FUMARATE DIHYDRATE 2 PUFF(S): 160; 4.5 AEROSOL RESPIRATORY (INHALATION) at 20:35

## 2023-11-24 RX ADMIN — Medication 100 MILLIGRAM(S): at 10:35

## 2023-11-24 RX ADMIN — Medication 40 MILLIGRAM(S): at 10:35

## 2023-11-24 RX ADMIN — Medication 1 MILLIGRAM(S): at 23:09

## 2023-11-24 RX ADMIN — CLOPIDOGREL BISULFATE 75 MILLIGRAM(S): 75 TABLET, FILM COATED ORAL at 10:35

## 2023-11-24 RX ADMIN — Medication 180 MILLIGRAM(S): at 10:35

## 2023-11-24 RX ADMIN — Medication 25 MILLIGRAM(S): at 10:36

## 2023-11-24 RX ADMIN — GABAPENTIN 400 MILLIGRAM(S): 400 CAPSULE ORAL at 23:06

## 2023-11-24 RX ADMIN — GABAPENTIN 400 MILLIGRAM(S): 400 CAPSULE ORAL at 15:18

## 2023-11-24 RX ADMIN — BUDESONIDE AND FORMOTEROL FUMARATE DIHYDRATE 2 PUFF(S): 160; 4.5 AEROSOL RESPIRATORY (INHALATION) at 08:33

## 2023-11-24 RX ADMIN — ATORVASTATIN CALCIUM 80 MILLIGRAM(S): 80 TABLET, FILM COATED ORAL at 23:07

## 2023-11-24 RX ADMIN — GABAPENTIN 400 MILLIGRAM(S): 400 CAPSULE ORAL at 06:04

## 2023-11-24 RX ADMIN — PANTOPRAZOLE SODIUM 40 MILLIGRAM(S): 20 TABLET, DELAYED RELEASE ORAL at 06:04

## 2023-11-24 RX ADMIN — Medication 10 MILLIGRAM(S): at 10:44

## 2023-11-24 RX ADMIN — Medication 650 MILLIGRAM(S): at 21:26

## 2023-11-24 RX ADMIN — PREGABALIN 1000 MICROGRAM(S): 225 CAPSULE ORAL at 10:35

## 2023-11-24 NOTE — PROGRESS NOTE ADULT - ASSESSMENT
67 y/o Male with a PMHx of anemia, Afib, CHF, COPD, cirrhosis, collapsed lung, emphysema  EtOH abuse, EtOH withdrawal, falls, GERD, HTN, meningitis, presence of Watchman left atrial appendage closure device presented with persistent dizziness/ vertigo. Patient reports he has had similar issues. Symptoms started suddenly while at home yesterday while walking, patient sat himself down, denies falls or injuries.  Reported constant dizziness and spinning sensation associated with nausea, no vomiting.  Also reports mild headache and dry mouth. No chest pain. No SOB. No recent sickness. No recent travel.       * Persistent Dizziness  Persistent Vertigo  -MRI- Acute CVA- Tiny acute infarct right frontal precentral gyrus.  . s/p MYA 11/20/23 Normal LVF, EF 60-65%. Left atrial occlusion device present with very small jose-device leak. No   evidence of jose-device thrombus, Moderate MR/TR.  very small  jose device leak - not likely to cause CVA per cardio and neuro- dw with Dr mcrae  . continue aspirin + Plavix   -Continue statin  -Outpatient sleep study for possible VIJAY-Neuro consult appreciated   Echo- left atrium severely dilated , pulm HTN, EF 60%   -cardio  and neuro input appreciated-  no indication for AC as pt has watchman's device ,  , stopped lovenox and started DAPT     *persistent  afib,   has watchmans device - no AC    metoprolol, diltiazem for rate control.    * right ICA  stenosis  -CTA shows 60% stenosis  -u/s shows no sig stenosis of either ICA  -vascular consult appreciated        * Acute on Chronic Diastolic CHF  change to PO Lasix      COPD  -stable  -continue Albuterol, Symbicort and Spiriva        Code status: Full code     neg  orthostatics   69 y/o Male with a PMHx of anemia, Afib, CHF, COPD, cirrhosis, collapsed lung, emphysema  EtOH abuse, EtOH withdrawal, falls, GERD, HTN, meningitis, presence of Watchman left atrial appendage closure device presented with persistent dizziness/ vertigo. Patient reports he has had similar issues. Symptoms started suddenly while at home yesterday while walking, patient sat himself down, denies falls or injuries.  Reported constant dizziness and spinning sensation associated with nausea, no vomiting.  Also reports mild headache and dry mouth. No chest pain. No SOB. No recent sickness. No recent travel.       * Somnolence, abnormal CT chest  check VBG  poss steroids and abx pending pulm fup  check BNP and change to IV LAsix if trend is up  to determine if at high risk for CO@ etention if dc to [place]    * Dizziness  Persistent Vertigo  -MRI- Acute CVA- Tiny acute infarct right frontal precentral gyrus.  . s/p MYA 11/20/23 Normal LVF, EF 60-65%. Left atrial occlusion device present with very small jose-device leak. No   evidence of jose-device thrombus, Moderate MR/TR.  very small  jose device leak - not likely to cause CVA per cardio and neuro- dw with Dr mcrae  . continue aspirin + Plavix   -Continue statin  -Outpatient sleep study for possible VIJAY-Neuro consult appreciated   Echo- left atrium severely dilated , pulm HTN, EF 60%   -cardio  and neuro input appreciated-  no indication for AC as pt has watchman's device ,  , stopped lovenox and started DAPT     *persistent  afib,   has watchmans device - no AC    metoprolol, diltiazem for rate control.    * right ICA  stenosis  -CTA shows 60% stenosis  -u/s shows no sig stenosis of either ICA  -vascular consult appreciated        * Acute on Chronic Diastolic CHF  change to PO Lasix      COPD  -stable  -continue Albuterol, Symbicort and Spiriva      d/w daughter    neg  orthostatics   67 y/o Male with a PMHx of anemia, Afib, CHF, COPD, cirrhosis, collapsed lung, emphysema  EtOH abuse, EtOH withdrawal, falls, GERD, HTN, meningitis, presence of Watchman left atrial appendage closure device presented with persistent dizziness/ vertigo. Patient reports he has had similar issues. Symptoms started suddenly while at home yesterday while walking, patient sat himself down, denies falls or injuries.  Reported constant dizziness and spinning sensation associated with nausea, no vomiting.  Also reports mild headache and dry mouth. No chest pain. No SOB. No recent sickness. No recent travel.       * Somnolence, abnormal CT chest ordered this am  check VBG- resp acidosis   start steroids  pt cant darryl bipap  high risk for decompensation      * Dizziness  Persistent Vertigo  -MRI- Acute CVA- Tiny acute infarct right frontal precentral gyrus.  . s/p MYA 11/20/23 Normal LVF, EF 60-65%. Left atrial occlusion device present with very small jose-device leak. No   evidence of jose-device thrombus, Moderate MR/TR.  very small  jose device leak - not likely to cause CVA per cardio and neuro- dw with Dr mcrae  . continue aspirin + Plavix   -Continue statin  -Outpatient sleep study for possible VIJAY-Neuro consult appreciated   Echo- left atrium severely dilated , pulm HTN, EF 60%   -cardio  and neuro input appreciated-  no indication for AC as pt has watchman's device ,  , stopped lovenox and started DAPT     *persistent  afib,   has watchmans device - no AC    metoprolol, diltiazem for rate control.    * right ICA  stenosis  -CTA shows 60% stenosis  -u/s shows no sig stenosis of either ICA  -vascular consult appreciated        * Acute on Chronic Diastolic CHF  change to PO Lasix      COPD  -stable  -continue Albuterol, Symbicort and Spiriva      d/w daughter and  Dr Ruiz    not safe for dc

## 2023-11-24 NOTE — PROGRESS NOTE ADULT - SUBJECTIVE AND OBJECTIVE BOX
Subjective: overall feels better, more awake and alert sitting in chair , cough improved , sob improving ,           Vital Signs Last 24 Hrs  T(C): 37 (24 Nov 2023 07:15), Max: 37 (24 Nov 2023 07:15)  T(F): 98.6 (24 Nov 2023 07:15), Max: 98.6 (24 Nov 2023 07:15)  HR: 69 (24 Nov 2023 07:15) (55 - 69)  BP: 102/60 (24 Nov 2023 07:15) (102/60 - 164/59)  BP(mean): --  RR: 18 (24 Nov 2023 07:15) (18 - 18)  SpO2: 100% (24 Nov 2023 07:15) (94% - 100%)    Parameters below as of 24 Nov 2023 07:15  Patient On (Oxygen Delivery Method): nasal cannula  O2 Flow (L/min): 5        Physical exam   HEENT:   pupils equal and reactive, EOMI, no oropharyngeal lesions, erythema, exudates, oral thrush    NECK:   supple, no carotid bruits, no palpable lymph nodes, no thyromegaly    CV:  +S1, +S2, regular, no murmurs or rubs    RESP:  crackles and rhonchi b/l lung feilds improving air entry    BREAST:  not examined    GI:  abdomen soft, non-tender, non-distended, normal BS, no bruits,     RECTAL:  not examined  :  not examined    MSK:   normal muscle tone, no atrophy, no rigidity, no contractions    EXT:   no clubbing, no cyanosis, no edema, no calf pain, swelling or erythema    VASCULAR:  pulses equal and symmetric in the upper and lower extremities    NEURO:  AAOX3, no focal neurological deficits, follows all commands, able to move extremities spontaneously    SKIN:  no ulcers, lesions or rashes                              10.6   6.89  )-----------( 185      ( 22 Nov 2023 06:31 )             34.0   11-22    134<L>  |  98  |  16  ----------------------------<  109<H>  4.0   |  32<H>  |  0.78    Ca    8.9      22 Nov 2023 06:31          MEDICATIONS  (STANDING):  atorvastatin 80 milliGRAM(s) Oral at bedtime  budesonide 160 MICROgram(s)/formoterol 4.5 MICROgram(s) Inhaler 2 Puff(s) Inhalation two times a day  busPIRone 10 milliGRAM(s) Oral two times a day  clopidogrel Tablet 75 milliGRAM(s) Oral daily  cyanocobalamin 1000 MICROGram(s) Oral daily  diltiazem    milliGRAM(s) Oral daily  doxazosin 1 milliGRAM(s) Oral at bedtime  folic acid 1 milliGRAM(s) Oral daily  furosemide   Injectable 40 milliGRAM(s) IV Push two times a day  gabapentin 400 milliGRAM(s) Oral three times a day  influenza  Vaccine (HIGH DOSE) 0.7 milliLiter(s) IntraMuscular once  metoprolol tartrate 25 milliGRAM(s) Oral two times a day  multivitamin 1 Tablet(s) Oral daily  pantoprazole    Tablet 40 milliGRAM(s) Oral before breakfast  psyllium Powder 2 Packet(s) Oral daily  QUEtiapine 100 milliGRAM(s) Oral at bedtime  spironolactone 50 milliGRAM(s) Oral daily  thiamine 100 milliGRAM(s) Oral daily  tiotropium 2.5 MICROgram(s) Inhaler 2 Puff(s) Inhalation daily  zaleplon 5 milliGRAM(s) Oral at bedtime      < from: MR Head No Cont (11.18.23 @ 12:45) > personally reviewed  IMPRESSION: Age-appropriate involutional and ischemic gliotic changes.   Tiny acute infarct right frontal precentral gyrus.   Somnolent, diffuse rhonchi        Vital Signs Last 24 Hrs  T(C): 37 (24 Nov 2023 07:15), Max: 37 (24 Nov 2023 07:15)  T(F): 98.6 (24 Nov 2023 07:15), Max: 98.6 (24 Nov 2023 07:15)  HR: 69 (24 Nov 2023 07:15) (55 - 69)  BP: 102/60 (24 Nov 2023 07:15) (102/60 - 164/59)  BP(mean): --  RR: 18 (24 Nov 2023 07:15) (18 - 18)  SpO2: 100% (24 Nov 2023 07:15) (94% - 100%)    Parameters below as of 24 Nov 2023 07:15  Patient On (Oxygen Delivery Method): nasal cannula  O2 Flow (L/min): 5        Physical exam   HEENT:   pupils equal and reactive, EOMI, no oropharyngeal lesions, erythema, exudates, oral thrush    NECK:   supple, no carotid bruits, no palpable lymph nodes, no thyromegaly    CV:  +S1, +S2, regular, no murmurs or rubs    RESP:  crackles and rhonchi b/l lung feilds improving air entry    GI:  abdomen soft, non-tender, non-distended, normal BS, no bruits,       MSK:   normal muscle tone, no atrophy, no rigidity, no contractions    EXT:   no clubbing, no cyanosis, no edema, no calf pain, swelling or erythema    VASCULAR:  pulses equal and symmetric in the upper and lower extremities    NEURO:  AAOX3, no focal neurological deficits, follows all commands, able to move extremities spontaneously    SKIN:  no ulcers, lesions or rashes                              10.6   6.89  )-----------( 185      ( 22 Nov 2023 06:31 )             34.0   11-22    134<L>  |  98  |  16  ----------------------------<  109<H>  4.0   |  32<H>  |  0.78    Ca    8.9      22 Nov 2023 06:31          MEDICATIONS  (STANDING):  atorvastatin 80 milliGRAM(s) Oral at bedtime  budesonide 160 MICROgram(s)/formoterol 4.5 MICROgram(s) Inhaler 2 Puff(s) Inhalation two times a day  busPIRone 10 milliGRAM(s) Oral two times a day  clopidogrel Tablet 75 milliGRAM(s) Oral daily  cyanocobalamin 1000 MICROGram(s) Oral daily  diltiazem    milliGRAM(s) Oral daily  doxazosin 1 milliGRAM(s) Oral at bedtime  folic acid 1 milliGRAM(s) Oral daily  furosemide   Injectable 40 milliGRAM(s) IV Push two times a day  gabapentin 400 milliGRAM(s) Oral three times a day  influenza  Vaccine (HIGH DOSE) 0.7 milliLiter(s) IntraMuscular once  metoprolol tartrate 25 milliGRAM(s) Oral two times a day  multivitamin 1 Tablet(s) Oral daily  pantoprazole    Tablet 40 milliGRAM(s) Oral before breakfast  psyllium Powder 2 Packet(s) Oral daily  QUEtiapine 100 milliGRAM(s) Oral at bedtime  spironolactone 50 milliGRAM(s) Oral daily  thiamine 100 milliGRAM(s) Oral daily  tiotropium 2.5 MICROgram(s) Inhaler 2 Puff(s) Inhalation daily  zaleplon 5 milliGRAM(s) Oral at bedtime      < from: MR Head No Cont (11.18.23 @ 12:45) > personally reviewed  IMPRESSION: Age-appropriate involutional and ischemic gliotic changes.   Tiny acute infarct right frontal precentral gyrus.      < from: CT Chest No Cont (11.24.23 @ 11:36) >  IMPRESSION:  Interstitial lung disease with increased groundglass opacity compared to   prior studies and may be infectious/inflammatory (including active   interstitial lung disease) versus fluid overload.    < end of copied text >   Somnolent, diffuse rhonchi  vbg- pt is more acidotic, CT chest worsening        Vital Signs Last 24 Hrs  T(C): 37 (24 Nov 2023 07:15), Max: 37 (24 Nov 2023 07:15)  T(F): 98.6 (24 Nov 2023 07:15), Max: 98.6 (24 Nov 2023 07:15)  HR: 69 (24 Nov 2023 07:15) (55 - 69)  BP: 102/60 (24 Nov 2023 07:15) (102/60 - 164/59)  BP(mean): --  RR: 18 (24 Nov 2023 07:15) (18 - 18)  SpO2: 100% (24 Nov 2023 07:15) (94% - 100%)    Parameters below as of 24 Nov 2023 07:15  Patient On (Oxygen Delivery Method): nasal cannula  O2 Flow (L/min): 5        Physical exam   HEENT:   pupils equal and reactive, EOMI, no oropharyngeal lesions, erythema, exudates, oral thrush    NECK:   supple, no carotid bruits, no palpable lymph nodes, no thyromegaly    CV:  +S1, +S2, regular, no murmurs or rubs    RESP:  crackles and rhonchi b/l lung feilds improving air entry    GI:  abdomen soft, non-tender, non-distended, normal BS, no bruits,       MSK:   normal muscle tone, no atrophy, no rigidity, no contractions    EXT:   no clubbing, no cyanosis, no edema, no calf pain, swelling or erythema    VASCULAR:  pulses equal and symmetric in the upper and lower extremities    NEURO:  AAOX3, no focal neurological deficits, follows all commands, able to move extremities spontaneously    SKIN:  no ulcers, lesions or rashes                              10.6   6.89  )-----------( 185      ( 22 Nov 2023 06:31 )             34.0   11-22    134<L>  |  98  |  16  ----------------------------<  109<H>  4.0   |  32<H>  |  0.78    Ca    8.9      22 Nov 2023 06:31          MEDICATIONS  (STANDING):  atorvastatin 80 milliGRAM(s) Oral at bedtime  budesonide 160 MICROgram(s)/formoterol 4.5 MICROgram(s) Inhaler 2 Puff(s) Inhalation two times a day  busPIRone 10 milliGRAM(s) Oral two times a day  clopidogrel Tablet 75 milliGRAM(s) Oral daily  cyanocobalamin 1000 MICROGram(s) Oral daily  diltiazem    milliGRAM(s) Oral daily  doxazosin 1 milliGRAM(s) Oral at bedtime  folic acid 1 milliGRAM(s) Oral daily  furosemide   Injectable 40 milliGRAM(s) IV Push two times a day  gabapentin 400 milliGRAM(s) Oral three times a day  influenza  Vaccine (HIGH DOSE) 0.7 milliLiter(s) IntraMuscular once  metoprolol tartrate 25 milliGRAM(s) Oral two times a day  multivitamin 1 Tablet(s) Oral daily  pantoprazole    Tablet 40 milliGRAM(s) Oral before breakfast  psyllium Powder 2 Packet(s) Oral daily  QUEtiapine 100 milliGRAM(s) Oral at bedtime  spironolactone 50 milliGRAM(s) Oral daily  thiamine 100 milliGRAM(s) Oral daily  tiotropium 2.5 MICROgram(s) Inhaler 2 Puff(s) Inhalation daily  zaleplon 5 milliGRAM(s) Oral at bedtime      < from: MR Head No Cont (11.18.23 @ 12:45) > personally reviewed  IMPRESSION: Age-appropriate involutional and ischemic gliotic changes.   Tiny acute infarct right frontal precentral gyrus.      < from: CT Chest No Cont (11.24.23 @ 11:36) >  IMPRESSION:  Interstitial lung disease with increased groundglass opacity compared to   prior studies and may be infectious/inflammatory (including active   interstitial lung disease) versus fluid overload.    < end of copied text >

## 2023-11-24 NOTE — PROGRESS NOTE ADULT - SUBJECTIVE AND OBJECTIVE BOX
Prior Authorization Approval    Medication: testosterone (ANDROGEL 1.62 % PUMP) 20.25 MG/ACT gel -  was approved on 5/7/2021  Effective:   to 5/3/2022  Reference #:    Approved Dose/Quantity:   Insurance Company: [employer] - Phone [phone] Fax [phone]  Expected CoPay:    Pharmacy Filling the Rx: [id number] [id number] [place] [facility] [place] [address] [facility]  Pharmacy Notified: Yes  Patient Notified: Comment:  **Instructed pharmacy to notify patient when script is ready to /ship.**       Subjective:   patient seen and examined, Dr. New called again  FElt overall worse than yesterday.   He complains of fatigue, diffuse aches.      ROS:  reviewed unchanged      GENERAL APPEARANCE:  Pt. is not currently dyspneic, in no distress. Pt. is alert, oriented, and pleasant.  HEENT:   No icterus. Mucous membranes moist,   NECK:  Supple.  Jugular venous pressure not elevated.   HEART:    Regular. Normal S1 and S2. There are no murmurs, rubs or gallops appreciated  CHEST:  coarse BS b/l  EXTREMITIES:  There is no cyanosis, clubbing       Vital Signs Last 24 Hrs  T(C): 37 (24 Nov 2023 07:15), Max: 37 (24 Nov 2023 07:15)  T(F): 98.6 (24 Nov 2023 07:15), Max: 98.6 (24 Nov 2023 07:15)  HR: 77 (24 Nov 2023 08:36) (55 - 81)  BP: 102/60 (24 Nov 2023 07:15) (102/60 - 164/59)  BP(mean): --  RR: 18 (24 Nov 2023 07:15) (18 - 18)  SpO2: 96% (24 Nov 2023 08:36) (94% - 100%)  Parameters below as of 24 Nov 2023 08:36  Patient On (Oxygen Delivery Method): nasal cannula, 5 LPM        MEDICATIONS  (STANDING):  aspirin enteric coated 81 milliGRAM(s) Oral daily  atorvastatin 80 milliGRAM(s) Oral at bedtime  budesonide 160 MICROgram(s)/formoterol 4.5 MICROgram(s) Inhaler 2 Puff(s) Inhalation two times a day  busPIRone 10 milliGRAM(s) Oral two times a day  clopidogrel Tablet 75 milliGRAM(s) Oral daily  cyanocobalamin 1000 MICROGram(s) Oral daily  diltiazem    milliGRAM(s) Oral daily  doxazosin 1 milliGRAM(s) Oral at bedtime  enoxaparin Injectable 40 milliGRAM(s) SubCutaneous every 24 hours  folic acid 1 milliGRAM(s) Oral daily  furosemide    Tablet 40 milliGRAM(s) Oral daily  gabapentin 400 milliGRAM(s) Oral three times a day  influenza  Vaccine (HIGH DOSE) 0.7 milliLiter(s) IntraMuscular once  metoprolol tartrate 25 milliGRAM(s) Oral two times a day  multivitamin 1 Tablet(s) Oral daily  pantoprazole    Tablet 40 milliGRAM(s) Oral before breakfast  psyllium Powder 2 Packet(s) Oral daily  QUEtiapine 100 milliGRAM(s) Oral at bedtime  spironolactone 50 milliGRAM(s) Oral daily  thiamine 100 milliGRAM(s) Oral daily  tiotropium 2.5 MICROgram(s) Inhaler 2 Puff(s) Inhalation daily  zaleplon 5 milliGRAM(s) Oral at bedtime    MEDICATIONS  (PRN):  acetaminophen     Tablet .. 650 milliGRAM(s) Oral every 6 hours PRN Mild Pain (1 - 3)  albuterol    90 MICROgram(s) HFA Inhaler 2 Puff(s) Inhalation every 6 hours PRN Shortness of Breath and/or Wheezing  benzocaine/menthol Lozenge 1 Lozenge Oral every 6 hours PRN Sore Throat  meclizine 25 milliGRAM(s) Oral every 8 hours PRN Dizziness  ondansetron Injectable 4 milliGRAM(s) IV Push every 6 hours PRN Nausea and/or Vomiting      Allergies    Duricef (Rash)  Ceclor (Rash)    Intolerances    RADIOLOGY & ADDITIONAL TESTS (reviewed images):    < from: CT Chest No Cont (11.24.23 @ 11:36) >    ACC: [id number] EXAM:  CT CHEST   ORDERED BY: TAMMY NAVA     PROCEDURE DATE:  11/24/2023          INTERPRETATION:  EXAMINATION: CT CHEST    CLINICAL INDICATION: Respiratory failure.    TECHNIQUE: Noncontrast CT of the chest was obtained.    COMPARISON: Multiple CT, most recent 6/30/2023.    FINDINGS:    AIRWAYS AND LUNGS: Tracheal bronchial secretions.  Peripheral   reticulation with associated traction bronchiectasis and patchy opacity   that is increased from the prior study. No honeycombing.    MEDIASTINUM AND PLEURA: Mildly enlarged mediastinal lymph nodes. The   visualized portion of the thyroid gland is heterogeneous. There is no   pleural effusion. There is no pneumothorax.    HEART AND VESSELS: There is cardiomegaly.  There are atherosclerotic   calcifications of the aorta and coronary arteries.  There is no   pericardial effusion.  Left atrial appendage closure device.    UPPER ABDOMEN: Images of the upper abdomen demonstrate redemonstrated   splenic hypodensity..    BONES AND SOFT TISSUES: The bones are unremarkable.  The soft tissues are   unremarkable.    IMPRESSION:  Interstitial lung disease with increased groundglass opacity compared to   prior studies and may be infectious/inflammatory (including active   interstitial lung disease) versus fluid overload.    --- End of Report ---            RYAN MAYA MD; Attending Radiologist  This document has been electronically signed. Nov 24 2023 11:55AM    < end of copied text >      Assessment and Recommendation:   · Assessment      -- CT scan with increase groundglass opacities. Most likely related to volume overload. Would recommend optimization of diastolic HF.  -- Other differential include inflammation. When compared to the CT scan in June, he did have some groundglass opacities. I will order inflammatory marker testings as well, though volume overload seems to be high in differential.   -- He will need a repeat CT scan in 3 months. If persistant then will discuss about biopsy.   - cont O2  - has normal EF w moderate pulmonary htn  --Repeat Sleep study ordered.    -- The patient has a history of CO2 retention but has not been able to tolerate hospital bipap.   - cont Lovenox      d/w Dr. New      Problem/Recommendation - 1:  ·  Problem: Chronic respiratory failure with hypoxia and hypercapnia.     Problem/Recommendation - 2:  ·  Problem: Obesity hypoventilation syndrome.     Problem/Recommendation - 3:  ·  Problem: Moderate pulmonary hypertension.     Problem/Recommendation - 4:  ·  Problem: Acute on chronic heart failure with preserved ejection fraction (HFpEF).     Problem/Recommendation - 5:  ·  Problem: CVA (cerebrovascular accident).     Problem/Recommendation - 6: Airway patent, Nasal mucosa clear. Mouth with normal mucosa. Throat has no vesicles, no oropharyngeal exudates and uvula is midline.  Right facial swelling, TTP right maxillary sinus, no dental pain or signs of dental abscess, no overlying erythema.

## 2023-11-25 LAB
ANION GAP SERPL CALC-SCNC: 6 MMOL/L — SIGNIFICANT CHANGE UP (ref 5–17)
ANION GAP SERPL CALC-SCNC: 6 MMOL/L — SIGNIFICANT CHANGE UP (ref 5–17)
BUN SERPL-MCNC: 16 MG/DL — SIGNIFICANT CHANGE UP (ref 7–23)
BUN SERPL-MCNC: 16 MG/DL — SIGNIFICANT CHANGE UP (ref 7–23)
CALCIUM SERPL-MCNC: 8.6 MG/DL — SIGNIFICANT CHANGE UP (ref 8.5–10.1)
CALCIUM SERPL-MCNC: 8.6 MG/DL — SIGNIFICANT CHANGE UP (ref 8.5–10.1)
CHLORIDE SERPL-SCNC: 105 MMOL/L — SIGNIFICANT CHANGE UP (ref 96–108)
CHLORIDE SERPL-SCNC: 105 MMOL/L — SIGNIFICANT CHANGE UP (ref 96–108)
CO2 SERPL-SCNC: 27 MMOL/L — SIGNIFICANT CHANGE UP (ref 22–31)
CO2 SERPL-SCNC: 27 MMOL/L — SIGNIFICANT CHANGE UP (ref 22–31)
CREAT SERPL-MCNC: 0.77 MG/DL — SIGNIFICANT CHANGE UP (ref 0.5–1.3)
CREAT SERPL-MCNC: 0.77 MG/DL — SIGNIFICANT CHANGE UP (ref 0.5–1.3)
EGFR: 99 ML/MIN/1.73M2 — SIGNIFICANT CHANGE UP
EGFR: 99 ML/MIN/1.73M2 — SIGNIFICANT CHANGE UP
ERYTHROCYTE [SEDIMENTATION RATE] IN BLOOD: 78 MM/HR — HIGH (ref 0–20)
ERYTHROCYTE [SEDIMENTATION RATE] IN BLOOD: 78 MM/HR — HIGH (ref 0–20)
GLUCOSE SERPL-MCNC: 151 MG/DL — HIGH (ref 70–99)
GLUCOSE SERPL-MCNC: 151 MG/DL — HIGH (ref 70–99)
POTASSIUM SERPL-MCNC: 4.6 MMOL/L — SIGNIFICANT CHANGE UP (ref 3.5–5.3)
POTASSIUM SERPL-MCNC: 4.6 MMOL/L — SIGNIFICANT CHANGE UP (ref 3.5–5.3)
POTASSIUM SERPL-SCNC: 4.6 MMOL/L — SIGNIFICANT CHANGE UP (ref 3.5–5.3)
POTASSIUM SERPL-SCNC: 4.6 MMOL/L — SIGNIFICANT CHANGE UP (ref 3.5–5.3)
RHEUMATOID FACT SERPL-ACNC: <10 IU/ML — SIGNIFICANT CHANGE UP (ref 0–13)
RHEUMATOID FACT SERPL-ACNC: <10 IU/ML — SIGNIFICANT CHANGE UP (ref 0–13)
SODIUM SERPL-SCNC: 138 MMOL/L — SIGNIFICANT CHANGE UP (ref 135–145)
SODIUM SERPL-SCNC: 138 MMOL/L — SIGNIFICANT CHANGE UP (ref 135–145)

## 2023-11-25 PROCEDURE — 99233 SBSQ HOSP IP/OBS HIGH 50: CPT

## 2023-11-25 PROCEDURE — 99232 SBSQ HOSP IP/OBS MODERATE 35: CPT

## 2023-11-25 RX ORDER — BENZOCAINE AND MENTHOL 5; 1 G/100ML; G/100ML
1 LIQUID ORAL ONCE
Refills: 0 | Status: COMPLETED | OUTPATIENT
Start: 2023-11-25 | End: 2023-11-25

## 2023-11-25 RX ADMIN — GABAPENTIN 400 MILLIGRAM(S): 400 CAPSULE ORAL at 20:39

## 2023-11-25 RX ADMIN — Medication 1 MILLIGRAM(S): at 20:40

## 2023-11-25 RX ADMIN — Medication 100 MILLIGRAM(S): at 09:25

## 2023-11-25 RX ADMIN — TIOTROPIUM BROMIDE 2 PUFF(S): 18 CAPSULE ORAL; RESPIRATORY (INHALATION) at 08:25

## 2023-11-25 RX ADMIN — BENZOCAINE AND MENTHOL 1 LOZENGE: 5; 1 LIQUID ORAL at 01:14

## 2023-11-25 RX ADMIN — Medication 25 MILLIGRAM(S): at 20:40

## 2023-11-25 RX ADMIN — Medication 10 MILLIGRAM(S): at 09:25

## 2023-11-25 RX ADMIN — Medication 1 MILLIGRAM(S): at 09:25

## 2023-11-25 RX ADMIN — Medication 25 MILLIGRAM(S): at 09:25

## 2023-11-25 RX ADMIN — PREGABALIN 1000 MICROGRAM(S): 225 CAPSULE ORAL at 09:26

## 2023-11-25 RX ADMIN — BUDESONIDE AND FORMOTEROL FUMARATE DIHYDRATE 2 PUFF(S): 160; 4.5 AEROSOL RESPIRATORY (INHALATION) at 21:34

## 2023-11-25 RX ADMIN — PANTOPRAZOLE SODIUM 40 MILLIGRAM(S): 20 TABLET, DELAYED RELEASE ORAL at 06:28

## 2023-11-25 RX ADMIN — Medication 650 MILLIGRAM(S): at 19:48

## 2023-11-25 RX ADMIN — Medication 81 MILLIGRAM(S): at 09:24

## 2023-11-25 RX ADMIN — Medication 1 TABLET(S): at 09:24

## 2023-11-25 RX ADMIN — Medication 10 MILLIGRAM(S): at 20:39

## 2023-11-25 RX ADMIN — ENOXAPARIN SODIUM 40 MILLIGRAM(S): 100 INJECTION SUBCUTANEOUS at 06:28

## 2023-11-25 RX ADMIN — Medication 180 MILLIGRAM(S): at 09:26

## 2023-11-25 RX ADMIN — CLOPIDOGREL BISULFATE 75 MILLIGRAM(S): 75 TABLET, FILM COATED ORAL at 09:26

## 2023-11-25 RX ADMIN — GABAPENTIN 400 MILLIGRAM(S): 400 CAPSULE ORAL at 06:28

## 2023-11-25 RX ADMIN — Medication 40 MILLIGRAM(S): at 09:24

## 2023-11-25 RX ADMIN — Medication 40 MILLIGRAM(S): at 20:39

## 2023-11-25 RX ADMIN — BUDESONIDE AND FORMOTEROL FUMARATE DIHYDRATE 2 PUFF(S): 160; 4.5 AEROSOL RESPIRATORY (INHALATION) at 08:25

## 2023-11-25 RX ADMIN — QUETIAPINE FUMARATE 100 MILLIGRAM(S): 200 TABLET, FILM COATED ORAL at 20:40

## 2023-11-25 RX ADMIN — GABAPENTIN 400 MILLIGRAM(S): 400 CAPSULE ORAL at 13:59

## 2023-11-25 RX ADMIN — Medication 650 MILLIGRAM(S): at 04:54

## 2023-11-25 RX ADMIN — BENZOCAINE AND MENTHOL 1 LOZENGE: 5; 1 LIQUID ORAL at 01:15

## 2023-11-25 RX ADMIN — ATORVASTATIN CALCIUM 80 MILLIGRAM(S): 80 TABLET, FILM COATED ORAL at 20:40

## 2023-11-25 RX ADMIN — Medication 650 MILLIGRAM(S): at 03:54

## 2023-11-25 RX ADMIN — SPIRONOLACTONE 50 MILLIGRAM(S): 25 TABLET, FILM COATED ORAL at 09:24

## 2023-11-25 RX ADMIN — Medication 5 MILLIGRAM(S): at 20:40

## 2023-11-25 NOTE — PROGRESS NOTE ADULT - SUBJECTIVE AND OBJECTIVE BOX
Subjective:   patient seen and examined, Dr. New called again  FElt overall worse than yesterday.   He complains of fatigue, diffuse aches.    11/25: in bed, on 5 L/min NC O2, n distress, alert, awake. Has clear sputum.     ROS:  reviewed unchanged      GENERAL APPEARANCE:  Pt. is not currently dyspneic, in no distress. Pt. is alert, oriented, and pleasant.  HEENT:   No icterus. Mucous membranes moist,   NECK:  Supple.  Jugular venous pressure not elevated.   HEART:    Regular. Normal S1 and S2. There are no murmurs, rubs or gallops appreciated  CHEST:  few coarse BS b/l  EXTREMITIES:  There is no cyanosis, clubbing. 2 plus RLE edema.      Vital Signs Last 24 Hrs  T(C): 37 (24 Nov 2023 07:15), Max: 37 (24 Nov 2023 07:15)  T(F): 98.6 (24 Nov 2023 07:15), Max: 98.6 (24 Nov 2023 07:15)  HR: 77 (24 Nov 2023 08:36) (55 - 81)  BP: 102/60 (24 Nov 2023 07:15) (102/60 - 164/59)  BP(mean): --  RR: 18 (24 Nov 2023 07:15) (18 - 18)  SpO2: 96% (24 Nov 2023 08:36) (94% - 100%)  Parameters below as of 24 Nov 2023 08:36  Patient On (Oxygen Delivery Method): nasal cannula, 5 LPM        MEDICATIONS  (STANDING):  aspirin enteric coated 81 milliGRAM(s) Oral daily  atorvastatin 80 milliGRAM(s) Oral at bedtime  budesonide 160 MICROgram(s)/formoterol 4.5 MICROgram(s) Inhaler 2 Puff(s) Inhalation two times a day  busPIRone 10 milliGRAM(s) Oral two times a day  clopidogrel Tablet 75 milliGRAM(s) Oral daily  cyanocobalamin 1000 MICROGram(s) Oral daily  diltiazem    milliGRAM(s) Oral daily  doxazosin 1 milliGRAM(s) Oral at bedtime  enoxaparin Injectable 40 milliGRAM(s) SubCutaneous every 24 hours  folic acid 1 milliGRAM(s) Oral daily  furosemide    Tablet 40 milliGRAM(s) Oral daily  gabapentin 400 milliGRAM(s) Oral three times a day  influenza  Vaccine (HIGH DOSE) 0.7 milliLiter(s) IntraMuscular once  metoprolol tartrate 25 milliGRAM(s) Oral two times a day  multivitamin 1 Tablet(s) Oral daily  pantoprazole    Tablet 40 milliGRAM(s) Oral before breakfast  psyllium Powder 2 Packet(s) Oral daily  QUEtiapine 100 milliGRAM(s) Oral at bedtime  spironolactone 50 milliGRAM(s) Oral daily  thiamine 100 milliGRAM(s) Oral daily  tiotropium 2.5 MICROgram(s) Inhaler 2 Puff(s) Inhalation daily  zaleplon 5 milliGRAM(s) Oral at bedtime    MEDICATIONS  (PRN):  acetaminophen     Tablet .. 650 milliGRAM(s) Oral every 6 hours PRN Mild Pain (1 - 3)  albuterol    90 MICROgram(s) HFA Inhaler 2 Puff(s) Inhalation every 6 hours PRN Shortness of Breath and/or Wheezing  benzocaine/menthol Lozenge 1 Lozenge Oral every 6 hours PRN Sore Throat  meclizine 25 milliGRAM(s) Oral every 8 hours PRN Dizziness  ondansetron Injectable 4 milliGRAM(s) IV Push every 6 hours PRN Nausea and/or Vomiting      Allergies    Duricef (Rash)  Ceclor (Rash)    Intolerances    RADIOLOGY & ADDITIONAL TESTS (reviewed images):    < from: CT Chest No Cont (11.24.23 @ 11:36) >    ACC: [id number] EXAM:  CT CHEST   ORDERED BY: TAMMY NAVA     PROCEDURE DATE:  11/24/2023          INTERPRETATION:  EXAMINATION: CT CHEST    CLINICAL INDICATION: Respiratory failure.    TECHNIQUE: Noncontrast CT of the chest was obtained.    COMPARISON: Multiple CT, most recent 6/30/2023.    FINDINGS:    AIRWAYS AND LUNGS: Tracheal bronchial secretions.  Peripheral   reticulation with associated traction bronchiectasis and patchy opacity   that is increased from the prior study. No honeycombing.    MEDIASTINUM AND PLEURA: Mildly enlarged mediastinal lymph nodes. The   visualized portion of the thyroid gland is heterogeneous. There is no   pleural effusion. There is no pneumothorax.    HEART AND VESSELS: There is cardiomegaly.  There are atherosclerotic   calcifications of the aorta and coronary arteries.  There is no   pericardial effusion.  Left atrial appendage closure device.    UPPER ABDOMEN: Images of the upper abdomen demonstrate redemonstrated   splenic hypodensity..    BONES AND SOFT TISSUES: The bones are unremarkable.  The soft tissues are   unremarkable.    IMPRESSION:  Interstitial lung disease with increased groundglass opacity compared to   prior studies and may be infectious/inflammatory (including active   interstitial lung disease) versus fluid overload.    --- End of Report ---            RYAN MAYA MD; Attending Radiologist  This document has been electronically signed. Nov 24 2023 11:55AM    < end of copied text >      Assessment and Recommendation:   · Assessment      -- CT scan with increase groundglass opacities. Most likely related to volume overload. Would recommend optimization of diastolic HF.  -- Other differential include inflammation. When compared to the CT scan in June, he did have some groundglass opacities. I will order inflammatory marker testings as well, though volume overload seems to be high in differential.   -- He will need a repeat CT scan in 3 months. If persistant then will discuss about biopsy.   - cont O2  - has normal EF w moderate pulmonary htn  --Repeat Sleep study ordered.  --On IV Solumedrol 40 mg bid, Spiriva inhaler, Albuterol inhaler prn    -- The patient has a history of CO2 retention but has not been able to tolerate hospital bipap.   - cont Lovenox      d/w Dr. New      Problem/Recommendation - 1:  ·  Problem: Chronic respiratory failure with hypoxia and hypercapnia.     Problem/Recommendation - 2:  ·  Problem: Obesity hypoventilation syndrome.     Problem/Recommendation - 3:  ·  Problem: Moderate pulmonary hypertension.     Problem/Recommendation - 4:  ·  Problem: Acute on chronic heart failure with preserved ejection fraction (HFpEF).     Problem/Recommendation - 5:  ·  Problem: CVA (cerebrovascular accident).     Problem/Recommendation - 6:

## 2023-11-25 NOTE — PROGRESS NOTE ADULT - SUBJECTIVE AND OBJECTIVE BOX
Pt deteriorated 11/24, more lethargic,  more acidotic, CT chest worsening        Vital Signs Last 24 Hrs  T(C): 36.3 (24 Nov 2023 20:28), Max: 36.3 (24 Nov 2023 20:28)  T(F): 97.3 (24 Nov 2023 20:28), Max: 97.3 (24 Nov 2023 20:28)  HR: 69 (25 Nov 2023 08:27) (69 - 83)  BP: 105/63 (25 Nov 2023 08:00) (105/63 - 116/68)  BP(mean): --  RR: 18 (25 Nov 2023 08:00) (18 - 18)  SpO2: 100% (25 Nov 2023 08:00) (96% - 100%)    Parameters below as of 25 Nov 2023 08:27  Patient On (Oxygen Delivery Method): nasal cannula            Physical exam   HEENT:   pupils equal and reactive, EOMI, no oropharyngeal lesions, erythema, exudates, oral thrush    NECK:   supple, no carotid bruits, no palpable lymph nodes, no thyromegaly    CV:  +S1, +S2, regular, no murmurs or rubs    RESP:  crackles and rhonchi b/l lung feilds improving air entry    GI:  abdomen soft, non-tender, non-distended, normal BS, no bruits,       MSK:   normal muscle tone, no atrophy, no rigidity, no contractions    EXT:   no clubbing, no cyanosis, no edema, no calf pain, swelling or erythema    VASCULAR:  pulses equal and symmetric in the upper and lower extremities    NEURO:  AAOX3, no focal neurological deficits, follows all commands, able to move extremities spontaneously    SKIN:  no ulcers, lesions or rashes                              10.6   6.89  )-----------( 185      ( 22 Nov 2023 06:31 )             34.0   11-22    134<L>  |  98  |  16  ----------------------------<  109<H>  4.0   |  32<H>  |  0.78    Ca    8.9      22 Nov 2023 06:31        MEDICATIONS  (STANDING):  aspirin enteric coated 81 milliGRAM(s) Oral daily  atorvastatin 80 milliGRAM(s) Oral at bedtime  budesonide 160 MICROgram(s)/formoterol 4.5 MICROgram(s) Inhaler 2 Puff(s) Inhalation two times a day  busPIRone 10 milliGRAM(s) Oral two times a day  clopidogrel Tablet 75 milliGRAM(s) Oral daily  cyanocobalamin 1000 MICROGram(s) Oral daily  diltiazem    milliGRAM(s) Oral daily  doxazosin 1 milliGRAM(s) Oral at bedtime  enoxaparin Injectable 40 milliGRAM(s) SubCutaneous every 24 hours  folic acid 1 milliGRAM(s) Oral daily  furosemide    Tablet 40 milliGRAM(s) Oral daily  gabapentin 400 milliGRAM(s) Oral three times a day  influenza  Vaccine (HIGH DOSE) 0.7 milliLiter(s) IntraMuscular once  methylPREDNISolone sodium succinate Injectable 40 milliGRAM(s) IV Push two times a day  metoprolol tartrate 25 milliGRAM(s) Oral two times a day  multivitamin 1 Tablet(s) Oral daily  pantoprazole    Tablet 40 milliGRAM(s) Oral before breakfast  psyllium Powder 2 Packet(s) Oral daily  QUEtiapine 100 milliGRAM(s) Oral at bedtime  spironolactone 50 milliGRAM(s) Oral daily  thiamine 100 milliGRAM(s) Oral daily  tiotropium 2.5 MICROgram(s) Inhaler 2 Puff(s) Inhalation daily  zaleplon 5 milliGRAM(s) Oral at bedtime        < from: MR Head No Cont (11.18.23 @ 12:45) > personally reviewed  IMPRESSION: Age-appropriate involutional and ischemic gliotic changes.   Tiny acute infarct right frontal precentral gyrus.      < from: CT Chest No Cont (11.24.23 @ 11:36) >  IMPRESSION:  Interstitial lung disease with increased groundglass opacity compared to   prior studies and may be infectious/inflammatory (including active   interstitial lung disease) versus fluid overload.    < end of copied text >   Pt deteriorated 11/24, more lethargic,  more acidotic, CT chest worsening, today he is improved; willing to try CPAP at [facility]; at risk for co2 retention        Vital Signs Last 24 Hrs  T(C): 36.3 (24 Nov 2023 20:28), Max: 36.3 (24 Nov 2023 20:28)  T(F): 97.3 (24 Nov 2023 20:28), Max: 97.3 (24 Nov 2023 20:28)  HR: 69 (25 Nov 2023 08:27) (69 - 83)  BP: 105/63 (25 Nov 2023 08:00) (105/63 - 116/68)  BP(mean): --  RR: 18 (25 Nov 2023 08:00) (18 - 18)  SpO2: 100% (25 Nov 2023 08:00) (96% - 100%)    Parameters below as of 25 Nov 2023 08:27  Patient On (Oxygen Delivery Method): nasal cannula            Physical exam   HEENT:   pupils equal and reactive, EOMI, no oropharyngeal lesions, erythema, exudates, oral thrush    NECK:   supple, no carotid bruits, no palpable lymph nodes, no thyromegaly    CV:  +S1, +S2, regular, no murmurs or rubs    RESP:  crackles and rhonchi b/l lung feilds improving air entry    GI:  abdomen soft, non-tender, non-distended, normal BS, no bruits,       MSK:   normal muscle tone, no atrophy, no rigidity, no contractions    EXT:   no clubbing, no cyanosis, no edema, no calf pain, swelling or erythema    VASCULAR:  pulses equal and symmetric in the upper and lower extremities    NEURO:  AAOX3, no focal neurological deficits, follows all commands, able to move extremities spontaneously    SKIN:  no ulcers, lesions or rashes                              10.6   6.89  )-----------( 185      ( 22 Nov 2023 06:31 )             34.0   11-22    134<L>  |  98  |  16  ----------------------------<  109<H>  4.0   |  32<H>  |  0.78    Ca    8.9      22 Nov 2023 06:31        MEDICATIONS  (STANDING):  aspirin enteric coated 81 milliGRAM(s) Oral daily  atorvastatin 80 milliGRAM(s) Oral at bedtime  budesonide 160 MICROgram(s)/formoterol 4.5 MICROgram(s) Inhaler 2 Puff(s) Inhalation two times a day  busPIRone 10 milliGRAM(s) Oral two times a day  clopidogrel Tablet 75 milliGRAM(s) Oral daily  cyanocobalamin 1000 MICROGram(s) Oral daily  diltiazem    milliGRAM(s) Oral daily  doxazosin 1 milliGRAM(s) Oral at bedtime  enoxaparin Injectable 40 milliGRAM(s) SubCutaneous every 24 hours  folic acid 1 milliGRAM(s) Oral daily  furosemide    Tablet 40 milliGRAM(s) Oral daily  gabapentin 400 milliGRAM(s) Oral three times a day  influenza  Vaccine (HIGH DOSE) 0.7 milliLiter(s) IntraMuscular once  methylPREDNISolone sodium succinate Injectable 40 milliGRAM(s) IV Push two times a day  metoprolol tartrate 25 milliGRAM(s) Oral two times a day  multivitamin 1 Tablet(s) Oral daily  pantoprazole    Tablet 40 milliGRAM(s) Oral before breakfast  psyllium Powder 2 Packet(s) Oral daily  QUEtiapine 100 milliGRAM(s) Oral at bedtime  spironolactone 50 milliGRAM(s) Oral daily  thiamine 100 milliGRAM(s) Oral daily  tiotropium 2.5 MICROgram(s) Inhaler 2 Puff(s) Inhalation daily  zaleplon 5 milliGRAM(s) Oral at bedtime        < from: MR Head No Cont (11.18.23 @ 12:45) > personally reviewed  IMPRESSION: Age-appropriate involutional and ischemic gliotic changes.   Tiny acute infarct right frontal precentral gyrus.      < from: CT Chest No Cont (11.24.23 @ 11:36) >  IMPRESSION:  Interstitial lung disease with increased groundglass opacity compared to   prior studies and may be infectious/inflammatory (including active   interstitial lung disease) versus fluid overload.    < end of copied text >

## 2023-11-25 NOTE — PROGRESS NOTE ADULT - ASSESSMENT
69 y/o Male with a PMHx of anemia, Afib, CHF, COPD, cirrhosis, collapsed lung, emphysema  EtOH abuse, EtOH withdrawal, falls, GERD, HTN, meningitis, presence of Watchman left atrial appendage closure device presented with persistent dizziness/ vertigo. Patient reports he has had similar issues. Symptoms started suddenly while at home yesterday while walking, patient sat himself down, denies falls or injuries.  Reported constant dizziness and spinning sensation associated with nausea, no vomiting.  Also reports mild headache and dry mouth. No chest pain. No SOB. No recent sickness. No recent travel.       * Somnolence, abnormal CT chest ordered this am  check VBG- resp acidosis   started on steroids  pt cant darryl bipap  high risk for decompensation      * Dizziness  Persistent Vertigo  -MRI- Acute CVA- Tiny acute infarct right frontal precentral gyrus.  . s/p MYA 11/20/23 Normal LVF, EF 60-65%. Left atrial occlusion device present with very small jose-device leak. No   evidence of jose-device thrombus, Moderate MR/TR.  very small  jose device leak - not likely to cause CVA per cardio and neuro- dw with Dr mcrae  . continue aspirin + Plavix   -Continue statin  -Outpatient sleep study for possible VIJAY-Neuro consult appreciated   Echo- left atrium severely dilated , pulm HTN, EF 60%   -cardio  and neuro input appreciated-  no indication for AC as pt has watchman's device ,  , stopped lovenox and started DAPT     *persistent  afib,   has watchmans device - no AC    metoprolol, diltiazem for rate control.    * right ICA  stenosis  -CTA shows 60% stenosis  -u/s shows no sig stenosis of either ICA  -vascular consult appreciated        * Acute on Chronic Diastolic CHF  change to PO Lasix      COPD  -stable  -continue Albuterol, Symbicort and Spiriva      d/w daughter and  Dr Ruiz    not safe for dc; lives at [facility] 67 y/o Male with a PMHx of anemia, Afib, CHF, COPD, cirrhosis, collapsed lung, emphysema  EtOH abuse, EtOH withdrawal, falls, GERD, HTN, meningitis, presence of Watchman left atrial appendage closure device presented with persistent dizziness/ vertigo. Patient reports he has had similar issues. Symptoms started suddenly while at home yesterday while walking, patient sat himself down, denies falls or injuries.  Reported constant dizziness and spinning sensation associated with nausea, no vomiting.  Also reports mild headache and dry mouth. No chest pain. No SOB. No recent sickness. No recent travel.       * Somnolence, abnormal CT chest ordered this am  check VBG- resp acidosis   started on steroids 11/24, continue  soc wk for cpap approval he will try it  high risk for decompensation      * Dizziness  Persistent Vertigo  -MRI- Acute CVA- Tiny acute infarct right frontal precentral gyrus.  . s/p MYA 11/20/23 Normal LVF, EF 60-65%. Left atrial occlusion device present with very small jose-device leak. No   evidence of jose-device thrombus, Moderate MR/TR.  very small  jsoe device leak - not likely to cause CVA per cardio and neuro- dw with Dr mcrae  . continue aspirin + Plavix   -Continue statin  -Outpatient sleep study for possible VIJAY-Neuro consult appreciated   Echo- left atrium severely dilated , pulm HTN, EF 60%   -cardio  and neuro input appreciated-  no indication for AC as pt has watchman's device ,  , stopped lovenox and started DAPT     *persistent  afib,   has watchmans device - no AC    metoprolol, diltiazem for rate control.    * right ICA  stenosis  -CTA shows 60% stenosis  -u/s shows no sig stenosis of either ICA  -vascular consult appreciated        * Acute on Chronic Diastolic CHF  change to PO Lasix      COPD  -stable  -continue Albuterol, Symbicort and Spiriva        not safe for dc; lives at [facility]

## 2023-11-26 PROCEDURE — 99233 SBSQ HOSP IP/OBS HIGH 50: CPT

## 2023-11-26 PROCEDURE — 99232 SBSQ HOSP IP/OBS MODERATE 35: CPT

## 2023-11-26 RX ORDER — IPRATROPIUM/ALBUTEROL SULFATE 18-103MCG
3 AEROSOL WITH ADAPTER (GRAM) INHALATION EVERY 6 HOURS
Refills: 0 | Status: DISCONTINUED | OUTPATIENT
Start: 2023-11-26 | End: 2023-11-27

## 2023-11-26 RX ORDER — FUROSEMIDE 40 MG
40 TABLET ORAL
Refills: 0 | Status: DISCONTINUED | OUTPATIENT
Start: 2023-11-26 | End: 2023-11-29

## 2023-11-26 RX ORDER — BUDESONIDE, MICRONIZED 100 %
0.5 POWDER (GRAM) MISCELLANEOUS EVERY 12 HOURS
Refills: 0 | Status: DISCONTINUED | OUTPATIENT
Start: 2023-11-26 | End: 2023-11-27

## 2023-11-26 RX ADMIN — Medication 10 MILLIGRAM(S): at 09:36

## 2023-11-26 RX ADMIN — GABAPENTIN 400 MILLIGRAM(S): 400 CAPSULE ORAL at 06:32

## 2023-11-26 RX ADMIN — ENOXAPARIN SODIUM 40 MILLIGRAM(S): 100 INJECTION SUBCUTANEOUS at 06:33

## 2023-11-26 RX ADMIN — BUDESONIDE AND FORMOTEROL FUMARATE DIHYDRATE 2 PUFF(S): 160; 4.5 AEROSOL RESPIRATORY (INHALATION) at 08:21

## 2023-11-26 RX ADMIN — Medication 40 MILLIGRAM(S): at 21:10

## 2023-11-26 RX ADMIN — Medication 1 MILLIGRAM(S): at 09:37

## 2023-11-26 RX ADMIN — Medication 25 MILLIGRAM(S): at 09:36

## 2023-11-26 RX ADMIN — Medication 5 MILLIGRAM(S): at 21:11

## 2023-11-26 RX ADMIN — Medication 180 MILLIGRAM(S): at 09:50

## 2023-11-26 RX ADMIN — Medication 0.5 MILLIGRAM(S): at 20:45

## 2023-11-26 RX ADMIN — Medication 40 MILLIGRAM(S): at 11:52

## 2023-11-26 RX ADMIN — Medication 650 MILLIGRAM(S): at 01:48

## 2023-11-26 RX ADMIN — CLOPIDOGREL BISULFATE 75 MILLIGRAM(S): 75 TABLET, FILM COATED ORAL at 09:36

## 2023-11-26 RX ADMIN — BENZOCAINE AND MENTHOL 1 LOZENGE: 5; 1 LIQUID ORAL at 23:40

## 2023-11-26 RX ADMIN — Medication 40 MILLIGRAM(S): at 09:37

## 2023-11-26 RX ADMIN — Medication 10 MILLIGRAM(S): at 21:11

## 2023-11-26 RX ADMIN — Medication 81 MILLIGRAM(S): at 09:37

## 2023-11-26 RX ADMIN — QUETIAPINE FUMARATE 100 MILLIGRAM(S): 200 TABLET, FILM COATED ORAL at 21:11

## 2023-11-26 RX ADMIN — Medication 40 MILLIGRAM(S): at 09:36

## 2023-11-26 RX ADMIN — Medication 3 MILLILITER(S): at 20:44

## 2023-11-26 RX ADMIN — Medication 20 MILLIGRAM(S): at 11:52

## 2023-11-26 RX ADMIN — Medication 1 MILLIGRAM(S): at 21:11

## 2023-11-26 RX ADMIN — Medication 1 TABLET(S): at 09:37

## 2023-11-26 RX ADMIN — GABAPENTIN 400 MILLIGRAM(S): 400 CAPSULE ORAL at 21:11

## 2023-11-26 RX ADMIN — TIOTROPIUM BROMIDE 2 PUFF(S): 18 CAPSULE ORAL; RESPIRATORY (INHALATION) at 08:21

## 2023-11-26 RX ADMIN — PANTOPRAZOLE SODIUM 40 MILLIGRAM(S): 20 TABLET, DELAYED RELEASE ORAL at 06:33

## 2023-11-26 RX ADMIN — Medication 25 MILLIGRAM(S): at 21:11

## 2023-11-26 RX ADMIN — PREGABALIN 1000 MICROGRAM(S): 225 CAPSULE ORAL at 09:37

## 2023-11-26 RX ADMIN — GABAPENTIN 400 MILLIGRAM(S): 400 CAPSULE ORAL at 15:35

## 2023-11-26 RX ADMIN — Medication 650 MILLIGRAM(S): at 21:11

## 2023-11-26 RX ADMIN — Medication 100 MILLIGRAM(S): at 09:36

## 2023-11-26 RX ADMIN — Medication 650 MILLIGRAM(S): at 09:37

## 2023-11-26 RX ADMIN — ATORVASTATIN CALCIUM 80 MILLIGRAM(S): 80 TABLET, FILM COATED ORAL at 21:11

## 2023-11-26 RX ADMIN — SPIRONOLACTONE 50 MILLIGRAM(S): 25 TABLET, FILM COATED ORAL at 09:36

## 2023-11-26 NOTE — PROGRESS NOTE ADULT - SUBJECTIVE AND OBJECTIVE BOX
Chief Complaint: Dizziness, vertigo, mild headache    Interval Hx: Patient seen and examined. Patient resting comfortably at edge of bed, eating lunch. He reports feeling some continued vertigo but more mild than compared to admission. He also reports ongoing dyspnea with minimal exertion, chronic, maybe slightly worse than his baseline. Rare cough. No chest pain. No fevers or rigors. Generalized weakness and malaise. No other complaints.     ROS: Multi system review is comprehensively negative x 10 systems except as above    Vitals:  T(F): 98.2 (26 Nov 2023 08:26), Max: 98.2 (26 Nov 2023 08:26)  HR: 73 (26 Nov 2023 08:26) (73 - 80)  BP: 129/90 (26 Nov 2023 08:26) (129/90 - 141/57)  RR: 18 (26 Nov 2023 08:26) (18 - 18)  SpO2: 99% (26 Nov 2023 08:26) (99% - 100%) on 5L/min via NC (uses home O2)    Exam:  Gen: No acute distress  HEENT: NCAT PERRL EOMI MMM clear oropharynx  Neck: Supple, no LAD, no C spine tenderness, no nuchal rigidity  CVS: s1 s2 normal, RRR  Chest: Normal resp effort, diminished breath sounds B/L, +wheeze  Abd: +B, soft, NT ND   Ext: RLE edema, chronic skin hyperpigmentation, LLE BKA.  Skin: Warm, dry  Mood: Calm, pleasant  Neuro: AOx3, no gross focal deficits    Labs:             10.6  6.89 )-------( 185           34.0      138  |  105  |  16  -----------------------<  151  4.6   |   27   |  0.77    Ca 8.6       ESR 78  RF < 10    ProBNP 913   A1c 5.6   LDL 66  HDL 40  Triglyceride 60    Imaging:  CT chest WO 11/24: Tracheal bronchial secretions.  Peripheral reticulation with associated traction bronchiectasis and patchy opacity   that is increased from the prior study. No honeycombing. Mildly enlarged mediastinal lymph nodes. The visualized portion of the thyroid gland is heterogeneous. There is no pleural effusion. There is no pneumothorax. There is cardiomegaly.  There are atherosclerotic   calcifications of the aorta and coronary arteries.  There is no pericardial effusion.  Left atrial appendage closure device. Images of the upper abdomen demonstrate redemonstrated splenic hypodensity. The bones are unremarkable.  The soft tissues are unremarkable.    CXR 11/19: Frontal expiratory view of the chest shows the heart to be similarly enlarged in size. The lungs show clear right lung with small left lower lobe infiltrate and there is no evidence of pneumothorax nor definite pleural effusion.    MR brain WO 11/18: Ventricular and sulcal prominence is consistent with age-appropriate involutional change. Small vessel white matter ischemic changes are noted. There is a tiny acute infarct in a high right frontal cortex along the precentral gyrus, new since 11/7/2017. No acute hemorrhage is identified. The sellar and parasellar structures are unremarkable. There is mild mucosal thickening of the ethmoid sinuses.    US duplex B/L carotid arteries 11/18: No significant hemodynamic stenosis of either carotid artery.    CTA neck W/ IV cont 11/18: Moderate, 60%, narrowing right ICA origin. No other significant arterial narrowing in the neck.    CTA head w/ IV cont 11/18: No flow-limiting stenosis, occlusion or aneurysm.    CT head 11/18: No apparent acute infarct, hemorrhage or mass. No hydrocephalus. Chronic appearing white matter hypodensities and volume loss.    Cardiac Testing:  MYA 11/20: Normal left ventricular size and systolic function. Estimated LVEF is 60-65%. Left atrial occlusion device present with small jose-device leak. No evidence of jose-device thrombus. Moderate mitral and tricuspid regurgitation.    EKG 11/17: Afib, rate 57, no acute changes    Meds:  MEDICATIONS  (STANDING):  albuterol/ipratropium for Nebulization 3 milliLiter(s) Nebulizer every 6 hours  aspirin enteric coated 81 milliGRAM(s) Oral daily  atorvastatin 80 milliGRAM(s) Oral at bedtime  buDESOnide    Inhalation Suspension 0.5 milliGRAM(s) Inhalation every 12 hours  busPIRone 10 milliGRAM(s) Oral two times a day  clopidogrel Tablet 75 milliGRAM(s) Oral daily  cyanocobalamin 1000 MICROGram(s) Oral daily  diltiazem    milliGRAM(s) Oral daily  doxazosin 1 milliGRAM(s) Oral at bedtime  enoxaparin Injectable 40 milliGRAM(s) SubCutaneous every 24 hours  folic acid 1 milliGRAM(s) Oral daily  furosemide   Injectable 40 milliGRAM(s) IV Push two times a day  gabapentin 400 milliGRAM(s) Oral three times a day  influenza  Vaccine (HIGH DOSE) 0.7 milliLiter(s) IntraMuscular once  methylPREDNISolone sodium succinate Injectable 40 milliGRAM(s) IV Push two times a day  metoprolol tartrate 25 milliGRAM(s) Oral two times a day  multivitamin 1 Tablet(s) Oral daily  pantoprazole    Tablet 40 milliGRAM(s) Oral before breakfast  psyllium Powder 2 Packet(s) Oral daily  QUEtiapine 100 milliGRAM(s) Oral at bedtime  spironolactone 50 milliGRAM(s) Oral daily  thiamine 100 milliGRAM(s) Oral daily  tiotropium 2.5 MICROgram(s) Inhaler 2 Puff(s) Inhalation daily  zaleplon 5 milliGRAM(s) Oral at bedtime    MEDICATIONS  (PRN):  acetaminophen     Tablet .. 650 milliGRAM(s) Oral every 6 hours PRN Mild Pain (1 - 3)  albuterol    90 MICROgram(s) HFA Inhaler 2 Puff(s) Inhalation every 6 hours PRN Shortness of Breath and/or Wheezing  benzocaine/menthol Lozenge 1 Lozenge Oral every 6 hours PRN Sore Throat  meclizine 25 milliGRAM(s) Oral every 8 hours PRN Dizziness  ondansetron Injectable 4 milliGRAM(s) IV Push every 6 hours PRN Nausea and/or Vomiting

## 2023-11-26 NOTE — PROGRESS NOTE ADULT - SUBJECTIVE AND OBJECTIVE BOX
Subjective:   patient seen and examined, Dr. New called again  FElt overall worse than yesterday.   He complains of fatigue, diffuse aches.    11/25: in bed, on 5 L/min NC O2, n distress, alert, awake. Has clear sputum.   11/26: in bed, on 5 L/min NC O2.  Expir wheezing on exam this AM.     ROS:  reviewed unchanged      GENERAL APPEARANCE:  Pt. is not currently dyspneic, in no distress. Pt. is alert, oriented, and pleasant.  HEENT:   No icterus. Mucous membranes moist,   NECK:  Supple.  Jugular venous pressure not elevated.   HEART:    Regular. Normal S1 and S2. There are no murmurs, rubs or gallops appreciated  CHEST:  bialt expir wheezes  EXTREMITIES:  There is no cyanosis, clubbing. 2 plus RLE edema. L BTK amputation.      Vital Signs Last 24 Hrs  T(C): 37 (24 Nov 2023 07:15), Max: 37 (24 Nov 2023 07:15)  T(F): 98.6 (24 Nov 2023 07:15), Max: 98.6 (24 Nov 2023 07:15)  HR: 77 (24 Nov 2023 08:36) (55 - 81)  BP: 102/60 (24 Nov 2023 07:15) (102/60 - 164/59)  BP(mean): --  RR: 18 (24 Nov 2023 07:15) (18 - 18)  SpO2: 96% (24 Nov 2023 08:36) (94% - 100%)  Parameters below as of 24 Nov 2023 08:36  Patient On (Oxygen Delivery Method): nasal cannula, 5 LPM        MEDICATIONS  (STANDING):  aspirin enteric coated 81 milliGRAM(s) Oral daily  atorvastatin 80 milliGRAM(s) Oral at bedtime  budesonide 160 MICROgram(s)/formoterol 4.5 MICROgram(s) Inhaler 2 Puff(s) Inhalation two times a day  busPIRone 10 milliGRAM(s) Oral two times a day  clopidogrel Tablet 75 milliGRAM(s) Oral daily  cyanocobalamin 1000 MICROGram(s) Oral daily  diltiazem    milliGRAM(s) Oral daily  doxazosin 1 milliGRAM(s) Oral at bedtime  enoxaparin Injectable 40 milliGRAM(s) SubCutaneous every 24 hours  folic acid 1 milliGRAM(s) Oral daily  furosemide    Tablet 40 milliGRAM(s) Oral daily  gabapentin 400 milliGRAM(s) Oral three times a day  influenza  Vaccine (HIGH DOSE) 0.7 milliLiter(s) IntraMuscular once  metoprolol tartrate 25 milliGRAM(s) Oral two times a day  multivitamin 1 Tablet(s) Oral daily  pantoprazole    Tablet 40 milliGRAM(s) Oral before breakfast  psyllium Powder 2 Packet(s) Oral daily  QUEtiapine 100 milliGRAM(s) Oral at bedtime  spironolactone 50 milliGRAM(s) Oral daily  thiamine 100 milliGRAM(s) Oral daily  tiotropium 2.5 MICROgram(s) Inhaler 2 Puff(s) Inhalation daily  zaleplon 5 milliGRAM(s) Oral at bedtime    MEDICATIONS  (PRN):  acetaminophen     Tablet .. 650 milliGRAM(s) Oral every 6 hours PRN Mild Pain (1 - 3)  albuterol    90 MICROgram(s) HFA Inhaler 2 Puff(s) Inhalation every 6 hours PRN Shortness of Breath and/or Wheezing  benzocaine/menthol Lozenge 1 Lozenge Oral every 6 hours PRN Sore Throat  meclizine 25 milliGRAM(s) Oral every 8 hours PRN Dizziness  ondansetron Injectable 4 milliGRAM(s) IV Push every 6 hours PRN Nausea and/or Vomiting      Allergies    Duricef (Rash)  Ceclor (Rash)    Intolerances    RADIOLOGY & ADDITIONAL TESTS (reviewed images):    < from: CT Chest No Cont (11.24.23 @ 11:36) >    ACC: [id number] EXAM:  CT CHEST   ORDERED BY: TAMMY NAVA     PROCEDURE DATE:  11/24/2023          INTERPRETATION:  EXAMINATION: CT CHEST    CLINICAL INDICATION: Respiratory failure.    TECHNIQUE: Noncontrast CT of the chest was obtained.    COMPARISON: Multiple CT, most recent 6/30/2023.    FINDINGS:    AIRWAYS AND LUNGS: Tracheal bronchial secretions.  Peripheral   reticulation with associated traction bronchiectasis and patchy opacity   that is increased from the prior study. No honeycombing.    MEDIASTINUM AND PLEURA: Mildly enlarged mediastinal lymph nodes. The   visualized portion of the thyroid gland is heterogeneous. There is no   pleural effusion. There is no pneumothorax.    HEART AND VESSELS: There is cardiomegaly.  There are atherosclerotic   calcifications of the aorta and coronary arteries.  There is no   pericardial effusion.  Left atrial appendage closure device.    UPPER ABDOMEN: Images of the upper abdomen demonstrate redemonstrated   splenic hypodensity..    BONES AND SOFT TISSUES: The bones are unremarkable.  The soft tissues are   unremarkable.    IMPRESSION:  Interstitial lung disease with increased groundglass opacity compared to   prior studies and may be infectious/inflammatory (including active   interstitial lung disease) versus fluid overload.    --- End of Report ---            RYAN MAYA MD; Attending Radiologist  This document has been electronically signed. Nov 24 2023 11:55AM    < end of copied text >      Assessment and Recommendation:   · Assessment      -- CT scan with increase groundglass opacities. Most likely related to volume overload.  Recommend net diuresis.  -- Other differential include inflammation. When compared to the CT scan in June, he did have some groundglass opacities. I will order inflammatory marker testings as well, though volume overload seems to be high in differential.   -- He will need a repeat CT scan in 3 months. If persistant then will discuss about biopsy.   - cont O2. On 5 l/min NC O2, titrate down as trolerates.  - has normal EF w moderate pulmonary htn  --Repeat Sleep study ordered.  --On IV Solumedrol 40 mg bid, will give additional 20 mg IV this AM x 1.  change inhalers to budesonide nebs, duo nebs.     -- The patient has a history of CO2 retention but has not been able to tolerate hospital bipap.   - cont Lovenox      d/w Dr. New      Problem/Recommendation - 1:  ·  Problem: Chronic respiratory failure with hypoxia and hypercapnia.     Problem/Recommendation - 2:  ·  Problem: Obesity hypoventilation syndrome.     Problem/Recommendation - 3:  ·  Problem: Moderate pulmonary hypertension.     Problem/Recommendation - 4:  ·  Problem: Acute on chronic heart failure with preserved ejection fraction (HFpEF).     Problem/Recommendation - 5:  ·  Problem: CVA (cerebrovascular accident).     Problem/Recommendation - 6:

## 2023-11-26 NOTE — PROGRESS NOTE ADULT - ASSESSMENT
68 year old man with obesity, HTN, HLD, HFpEF, moderate mitral valve regurgitation, permanent afib, s/p Watchman, PAD s/p L BKA, VIJAY nonadherent to CPAP, OHS, COPD, pHTN, chronic hypoxic and hypercapneic respiratory failure on home O2, hemorrhoids s/p ligation earlier this year, chronic anemia, cirrhosis likely related to alcohol use and fatty liver due to obesity, last admitted to  in July for CHF exacerbation, presented 11/17 with complaint of sudden onset of dizziness, vertigo, mild headache, nausea. ED vitals were normal. Exam was unremarkable aside for his known obese body habitus, L BKA. WBC 8.4. Hgb 10.8. BMP, LFTs WNL aside for mild hypoalbuminemia. Troponin negative. EKG afib, rate 57, no acute changes. UA with glucosuria and trace ketones. CT head w/ no apparent acute infarct, hemorrhage or mass. No hydrocephalus. Chronic appearing white matter hypodensities and volume loss. CTA head with no flow-limiting stenosis, occlusion or aneurysm. CTA neck with moderate, 60%, narrowing right ICA origin. No other significant arterial narrowing in the neck. Patient was given meclizine and admitted to Medicine with Neurology consult to advise on whether additional neuroimaging ought to be pursued.     Dizziness, vertigo    His neurological examination was non-focal. CT imaging in the ED as described above. Neurology input was appreciated. MRI brain was obtained for completeness and MRI did showed a tiny acute infarct in the right frontal precentral gyrus, though this may not necessarily be the cause of dizziness. See below.  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 Appreciate Neurology input. Has known afib. Not on AC as patient is s/p Watchman. MYA done 11/20.  Estimated LVEF is 60-65%. Left atrial occlusion device present with small jose-device leak. No evidence of jose-device thrombus. Regarding CT-reported R ICA stenosis, US duplex B/L carotid arteries was done. Found no significant hemodynamic stenosis of either carotid artery. Also, worth noting that his dizziness is unlikely from R ICA stenosis.   - Continue on aspirin, continue Plavix  - Continue high intensity statin  - Continue PT/OT    Non occlusive R ICA stenosis  Appreciate Vascular Surgery input.   - Continue antiplatelet, statin  - Outpatient follow up with Dr Perez.     Chronic respiratory failure with hypoxia and hypercapnia.   Likely multi factorial due to OHS, VIJAY nonadherent to CPAP, mod pHTN, CHF and now increasing ground glass lung opacities on CT imaging of the chest, see below.   - Continue to treat underlying issues  - Continue oxygen supplementation  - Continue to encourage NIPPV use but patient is deferring    Worsening pulmonary opacities  CT chest with increased groundglass opacity compared to prior studies and may be infectious/inflammatory (including active   interstitial lung disease) versus fluid overload. Appreciate Pulmonary Medicine input.   - Continue empiric IV steroids, bronchodilators  - Inflammatory marker testings  - Repeat CT scan in 3 months and if persistent, then will discuss about biopsy.   - Repeat sleep study    COPD, unable to rule out exacerbation  Appreciate input from Pulmonary Medicine  - Continue steroids and bronchodilators  - Follow up with Pulmonary as outpatient    Chronic diastolic CHF, unable to rule out acute component given CT chest findings   Hx of recurrent CHF exacerbations for HFpEF. MYA from this admission reviewed. Normal left ventricular size and systolic function. Estimated LVEF is 60-65%. Left atrial occlusion device present with small jose-device leak. No evidence of jose-device thrombus. Moderate mitral and tricuspid regurgitation. Mild fluid overload on exam. BNP elevated on admission 4176, now downtrending 3031.   - Continue Lasix 40 IV BID  - Continue metoprolol, spironolactone, doxazosin, diltiazem (rate control)  - Daily standing weights  - Is and Os    Permanent atrial fibrillation  Rate controlled; on metoprolol and diltiazem. CHADS2-VASC>2, AC contraindicated due to alcohol use disorder and frequent falls. S/p Watchman implanted Apr '19 at [facility]. MYA showed LA occlusion device present with small joes-device leak. No evidence of jose-device thrombus  - Cont. metoprolol, diltiazem for rate control.  - Not on AC (has RUDOLPH occlusion device)   68 year old man with obesity, HTN, HLD, HFpEF, moderate mitral valve regurgitation, permanent afib, s/p Watchman, PAD s/p L BKA, VIJAY nonadherent to CPAP, OHS, COPD, pHTN, chronic hypoxic and hypercapneic respiratory failure on home O2, hemorrhoids s/p ligation earlier this year, chronic anemia, cirrhosis likely related to alcohol use and fatty liver due to obesity, last admitted to  in July for CHF exacerbation, presented 11/17 with complaint of sudden onset of dizziness, vertigo, mild headache, nausea. ED vitals were normal. Exam was unremarkable aside for his known obese body habitus, L BKA. WBC 8.4. Hgb 10.8. BMP, LFTs WNL aside for mild hypoalbuminemia. Troponin negative. EKG afib, rate 57, no acute changes. UA with glucosuria and trace ketones. CT head w/ no apparent acute infarct, hemorrhage or mass. No hydrocephalus. Chronic appearing white matter hypodensities and volume loss. CTA head with no flow-limiting stenosis, occlusion or aneurysm. CTA neck with moderate, 60%, narrowing right ICA origin. No other significant arterial narrowing in the neck. Patient was given meclizine and admitted to Medicine with Neurology consult to advise on whether additional neuroimaging ought to be pursued.     Dizziness, vertigo    His neurological examination was non-focal. CT imaging in the ED as described above. Neurology input was appreciated. MRI brain was obtained for completeness and MRI did showed a tiny acute infarct in the right frontal precentral gyrus, though this may not necessarily be the cause of dizziness. See below.  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 Appreciate Neurology input. Has known afib. Not on AC as patient is s/p Watchman. MYA done 11/20. Estimated LVEF is 60-65%. Left atrial occlusion device present with small jose-device leak. No evidence of jose-device thrombus. Regarding CT-reported R ICA stenosis, US duplex B/L carotid arteries was done. Found no significant hemodynamic stenosis of either carotid artery. Also, worth noting that his dizziness is unlikely from R ICA stenosis.   - Continue on aspirin, continue Plavix  - Continue high intensity statin  - Continue PT/OT    Non occlusive R ICA stenosis  Appreciate Vascular Surgery input.   - Continue antiplatelet, statin  - Outpatient follow up with Dr Perez.     Chronic respiratory failure with hypoxia and hypercapnia.   Likely multi factorial due to OHS, VIJAY nonadherent to CPAP, mod pHTN, CHF and now increasing ground glass lung opacities on CT imaging of the chest, see below.   - Continue to treat underlying issues  - Continue oxygen supplementation  - Continue to encourage NIPPV use but patient is deferring    Worsening pulmonary opacities  CT chest with increased groundglass opacity compared to prior studies and may be infectious/inflammatory (including active   interstitial lung disease) versus fluid overload. Appreciate Pulmonary Medicine input.   - Continue empiric IV steroids, bronchodilators  - Inflammatory marker testings  - Repeat CT scan in 3 months and if persistent, then will discuss about biopsy.   - Repeat sleep study    COPD, unable to rule out exacerbation  Appreciate input from Pulmonary Medicine  - Continue steroids and bronchodilators  - Follow up with Pulmonary as outpatient    Chronic diastolic CHF, unable to rule out acute component given CT chest findings   Hx of recurrent CHF exacerbations for HFpEF. MYA from this admission reviewed. Normal left ventricular size and systolic function. Estimated LVEF is 60-65%. Left atrial occlusion device present with small jose-device leak. No evidence of jose-device thrombus. Moderate mitral and tricuspid regurgitation. Mild fluid overload on exam. BNP elevated on admission 4176, now downtrending 3031.   - Continue Lasix 40 IV BID  - Continue metoprolol, spironolactone, doxazosin, diltiazem (rate control)  - Daily standing weights  - Is and Os    Permanent atrial fibrillation  Rate controlled; on metoprolol and diltiazem. CHADS2-VASC>2, AC contraindicated due to alcohol use disorder and frequent falls. S/p Watchman implanted Apr '19 at [facility]. MYA showed LA occlusion device present with small jose-device leak. No evidence of jose-device thrombus  - Cont. metoprolol, diltiazem for rate control.  - Not on AC (has RUDOLPH occlusion device)

## 2023-11-27 LAB
ANA PAT FLD IF-IMP: ABNORMAL
ANA PAT FLD IF-IMP: ABNORMAL
ANA TITR SER: ABNORMAL
ANA TITR SER: ABNORMAL
ANION GAP SERPL CALC-SCNC: 6 MMOL/L — SIGNIFICANT CHANGE UP (ref 5–17)
ANION GAP SERPL CALC-SCNC: 6 MMOL/L — SIGNIFICANT CHANGE UP (ref 5–17)
BASOPHILS # BLD AUTO: 0.02 K/UL — SIGNIFICANT CHANGE UP (ref 0–0.2)
BASOPHILS # BLD AUTO: 0.02 K/UL — SIGNIFICANT CHANGE UP (ref 0–0.2)
BASOPHILS NFR BLD AUTO: 0.2 % — SIGNIFICANT CHANGE UP (ref 0–2)
BASOPHILS NFR BLD AUTO: 0.2 % — SIGNIFICANT CHANGE UP (ref 0–2)
BUN SERPL-MCNC: 22 MG/DL — SIGNIFICANT CHANGE UP (ref 7–23)
BUN SERPL-MCNC: 22 MG/DL — SIGNIFICANT CHANGE UP (ref 7–23)
C3 SERPL-MCNC: 140 MG/DL — SIGNIFICANT CHANGE UP (ref 81–157)
C3 SERPL-MCNC: 140 MG/DL — SIGNIFICANT CHANGE UP (ref 81–157)
C4 SERPL-MCNC: 20 MG/DL — SIGNIFICANT CHANGE UP (ref 13–39)
C4 SERPL-MCNC: 20 MG/DL — SIGNIFICANT CHANGE UP (ref 13–39)
CALCIUM SERPL-MCNC: 9 MG/DL — SIGNIFICANT CHANGE UP (ref 8.5–10.1)
CALCIUM SERPL-MCNC: 9 MG/DL — SIGNIFICANT CHANGE UP (ref 8.5–10.1)
CHLORIDE SERPL-SCNC: 102 MMOL/L — SIGNIFICANT CHANGE UP (ref 96–108)
CHLORIDE SERPL-SCNC: 102 MMOL/L — SIGNIFICANT CHANGE UP (ref 96–108)
CO2 SERPL-SCNC: 29 MMOL/L — SIGNIFICANT CHANGE UP (ref 22–31)
CO2 SERPL-SCNC: 29 MMOL/L — SIGNIFICANT CHANGE UP (ref 22–31)
CREAT SERPL-MCNC: 0.76 MG/DL — SIGNIFICANT CHANGE UP (ref 0.5–1.3)
CREAT SERPL-MCNC: 0.76 MG/DL — SIGNIFICANT CHANGE UP (ref 0.5–1.3)
DSDNA AB FLD-ACNC: <0.2 AI — SIGNIFICANT CHANGE UP
DSDNA AB FLD-ACNC: <0.2 AI — SIGNIFICANT CHANGE UP
DSDNA AB SER-ACNC: <12 IU/ML — SIGNIFICANT CHANGE UP
DSDNA AB SER-ACNC: <12 IU/ML — SIGNIFICANT CHANGE UP
EGFR: 99 ML/MIN/1.73M2 — SIGNIFICANT CHANGE UP
EGFR: 99 ML/MIN/1.73M2 — SIGNIFICANT CHANGE UP
ENA SCL70 AB SER-ACNC: <0.2 AI — SIGNIFICANT CHANGE UP
ENA SCL70 AB SER-ACNC: <0.2 AI — SIGNIFICANT CHANGE UP
ENA SS-A AB FLD IA-ACNC: <0.2 AI — SIGNIFICANT CHANGE UP
ENA SS-A AB FLD IA-ACNC: <0.2 AI — SIGNIFICANT CHANGE UP
EOSINOPHIL # BLD AUTO: 0 K/UL — SIGNIFICANT CHANGE UP (ref 0–0.5)
EOSINOPHIL # BLD AUTO: 0 K/UL — SIGNIFICANT CHANGE UP (ref 0–0.5)
EOSINOPHIL NFR BLD AUTO: 0 % — SIGNIFICANT CHANGE UP (ref 0–6)
EOSINOPHIL NFR BLD AUTO: 0 % — SIGNIFICANT CHANGE UP (ref 0–6)
GLUCOSE SERPL-MCNC: 152 MG/DL — HIGH (ref 70–99)
GLUCOSE SERPL-MCNC: 152 MG/DL — HIGH (ref 70–99)
HCT VFR BLD CALC: 34.8 % — LOW (ref 39–50)
HCT VFR BLD CALC: 34.8 % — LOW (ref 39–50)
HGB BLD-MCNC: 10.8 G/DL — LOW (ref 13–17)
HGB BLD-MCNC: 10.8 G/DL — LOW (ref 13–17)
IMM GRANULOCYTES NFR BLD AUTO: 0.6 % — SIGNIFICANT CHANGE UP (ref 0–0.9)
IMM GRANULOCYTES NFR BLD AUTO: 0.6 % — SIGNIFICANT CHANGE UP (ref 0–0.9)
LYMPHOCYTES # BLD AUTO: 0.48 K/UL — LOW (ref 1–3.3)
LYMPHOCYTES # BLD AUTO: 0.48 K/UL — LOW (ref 1–3.3)
LYMPHOCYTES # BLD AUTO: 3.8 % — LOW (ref 13–44)
LYMPHOCYTES # BLD AUTO: 3.8 % — LOW (ref 13–44)
MCHC RBC-ENTMCNC: 28.1 PG — SIGNIFICANT CHANGE UP (ref 27–34)
MCHC RBC-ENTMCNC: 28.1 PG — SIGNIFICANT CHANGE UP (ref 27–34)
MCHC RBC-ENTMCNC: 31 GM/DL — LOW (ref 32–36)
MCHC RBC-ENTMCNC: 31 GM/DL — LOW (ref 32–36)
MCV RBC AUTO: 90.6 FL — SIGNIFICANT CHANGE UP (ref 80–100)
MCV RBC AUTO: 90.6 FL — SIGNIFICANT CHANGE UP (ref 80–100)
MONOCYTES # BLD AUTO: 0.53 K/UL — SIGNIFICANT CHANGE UP (ref 0–0.9)
MONOCYTES # BLD AUTO: 0.53 K/UL — SIGNIFICANT CHANGE UP (ref 0–0.9)
MONOCYTES NFR BLD AUTO: 4.2 % — SIGNIFICANT CHANGE UP (ref 2–14)
MONOCYTES NFR BLD AUTO: 4.2 % — SIGNIFICANT CHANGE UP (ref 2–14)
NEUTROPHILS # BLD AUTO: 11.45 K/UL — HIGH (ref 1.8–7.4)
NEUTROPHILS # BLD AUTO: 11.45 K/UL — HIGH (ref 1.8–7.4)
NEUTROPHILS NFR BLD AUTO: 91.2 % — HIGH (ref 43–77)
NEUTROPHILS NFR BLD AUTO: 91.2 % — HIGH (ref 43–77)
NT-PROBNP SERPL-SCNC: 1233 PG/ML — HIGH (ref 0–125)
NT-PROBNP SERPL-SCNC: 1233 PG/ML — HIGH (ref 0–125)
PLATELET # BLD AUTO: 162 K/UL — SIGNIFICANT CHANGE UP (ref 150–400)
PLATELET # BLD AUTO: 162 K/UL — SIGNIFICANT CHANGE UP (ref 150–400)
POTASSIUM SERPL-MCNC: 4.1 MMOL/L — SIGNIFICANT CHANGE UP (ref 3.5–5.3)
POTASSIUM SERPL-MCNC: 4.1 MMOL/L — SIGNIFICANT CHANGE UP (ref 3.5–5.3)
POTASSIUM SERPL-SCNC: 4.1 MMOL/L — SIGNIFICANT CHANGE UP (ref 3.5–5.3)
POTASSIUM SERPL-SCNC: 4.1 MMOL/L — SIGNIFICANT CHANGE UP (ref 3.5–5.3)
RBC # BLD: 3.84 M/UL — LOW (ref 4.2–5.8)
RBC # BLD: 3.84 M/UL — LOW (ref 4.2–5.8)
RBC # FLD: 16.1 % — HIGH (ref 10.3–14.5)
RBC # FLD: 16.1 % — HIGH (ref 10.3–14.5)
SODIUM SERPL-SCNC: 137 MMOL/L — SIGNIFICANT CHANGE UP (ref 135–145)
SODIUM SERPL-SCNC: 137 MMOL/L — SIGNIFICANT CHANGE UP (ref 135–145)
TOTAL HEM COMP BLD-ACNC: 74 U/ML — SIGNIFICANT CHANGE UP (ref 42–95)
TOTAL HEM COMP BLD-ACNC: 74 U/ML — SIGNIFICANT CHANGE UP (ref 42–95)
WBC # BLD: 12.55 K/UL — HIGH (ref 3.8–10.5)
WBC # BLD: 12.55 K/UL — HIGH (ref 3.8–10.5)
WBC # FLD AUTO: 12.55 K/UL — HIGH (ref 3.8–10.5)
WBC # FLD AUTO: 12.55 K/UL — HIGH (ref 3.8–10.5)

## 2023-11-27 PROCEDURE — 99232 SBSQ HOSP IP/OBS MODERATE 35: CPT

## 2023-11-27 RX ORDER — ZALEPLON 10 MG
5 CAPSULE ORAL AT BEDTIME
Refills: 0 | Status: DISCONTINUED | OUTPATIENT
Start: 2023-11-27 | End: 2023-11-29

## 2023-11-27 RX ORDER — ALBUTEROL 90 UG/1
1 AEROSOL, METERED ORAL EVERY 6 HOURS
Refills: 0 | Status: DISCONTINUED | OUTPATIENT
Start: 2023-11-27 | End: 2023-11-29

## 2023-11-27 RX ORDER — BUDESONIDE AND FORMOTEROL FUMARATE DIHYDRATE 160; 4.5 UG/1; UG/1
2 AEROSOL RESPIRATORY (INHALATION)
Refills: 0 | Status: DISCONTINUED | OUTPATIENT
Start: 2023-11-27 | End: 2023-11-29

## 2023-11-27 RX ADMIN — PANTOPRAZOLE SODIUM 40 MILLIGRAM(S): 20 TABLET, DELAYED RELEASE ORAL at 06:28

## 2023-11-27 RX ADMIN — GABAPENTIN 400 MILLIGRAM(S): 400 CAPSULE ORAL at 06:28

## 2023-11-27 RX ADMIN — Medication 650 MILLIGRAM(S): at 14:49

## 2023-11-27 RX ADMIN — Medication 3 MILLILITER(S): at 01:53

## 2023-11-27 RX ADMIN — Medication 10 MILLIGRAM(S): at 09:37

## 2023-11-27 RX ADMIN — Medication 25 MILLIGRAM(S): at 21:13

## 2023-11-27 RX ADMIN — GABAPENTIN 400 MILLIGRAM(S): 400 CAPSULE ORAL at 21:13

## 2023-11-27 RX ADMIN — Medication 40 MILLIGRAM(S): at 14:19

## 2023-11-27 RX ADMIN — SPIRONOLACTONE 50 MILLIGRAM(S): 25 TABLET, FILM COATED ORAL at 09:37

## 2023-11-27 RX ADMIN — Medication 5 MILLIGRAM(S): at 21:12

## 2023-11-27 RX ADMIN — ATORVASTATIN CALCIUM 80 MILLIGRAM(S): 80 TABLET, FILM COATED ORAL at 21:12

## 2023-11-27 RX ADMIN — Medication 100 MILLIGRAM(S): at 09:36

## 2023-11-27 RX ADMIN — CLOPIDOGREL BISULFATE 75 MILLIGRAM(S): 75 TABLET, FILM COATED ORAL at 09:36

## 2023-11-27 RX ADMIN — ENOXAPARIN SODIUM 40 MILLIGRAM(S): 100 INJECTION SUBCUTANEOUS at 06:28

## 2023-11-27 RX ADMIN — Medication 650 MILLIGRAM(S): at 14:19

## 2023-11-27 RX ADMIN — Medication 81 MILLIGRAM(S): at 09:36

## 2023-11-27 RX ADMIN — PREGABALIN 1000 MICROGRAM(S): 225 CAPSULE ORAL at 09:37

## 2023-11-27 RX ADMIN — Medication 650 MILLIGRAM(S): at 20:20

## 2023-11-27 RX ADMIN — Medication 180 MILLIGRAM(S): at 09:36

## 2023-11-27 RX ADMIN — TIOTROPIUM BROMIDE 2 PUFF(S): 18 CAPSULE ORAL; RESPIRATORY (INHALATION) at 09:31

## 2023-11-27 RX ADMIN — QUETIAPINE FUMARATE 100 MILLIGRAM(S): 200 TABLET, FILM COATED ORAL at 21:13

## 2023-11-27 RX ADMIN — Medication 1 MILLIGRAM(S): at 09:36

## 2023-11-27 RX ADMIN — Medication 1 TABLET(S): at 09:36

## 2023-11-27 RX ADMIN — Medication 25 MILLIGRAM(S): at 09:37

## 2023-11-27 RX ADMIN — GABAPENTIN 400 MILLIGRAM(S): 400 CAPSULE ORAL at 14:19

## 2023-11-27 RX ADMIN — Medication 1 MILLIGRAM(S): at 21:13

## 2023-11-27 RX ADMIN — Medication 650 MILLIGRAM(S): at 19:57

## 2023-11-27 RX ADMIN — ALBUTEROL 1 PUFF(S): 90 AEROSOL, METERED ORAL at 21:26

## 2023-11-27 RX ADMIN — Medication 40 MILLIGRAM(S): at 09:45

## 2023-11-27 RX ADMIN — Medication 2 PACKET(S): at 09:37

## 2023-11-27 RX ADMIN — Medication 10 MILLIGRAM(S): at 21:13

## 2023-11-27 RX ADMIN — Medication 650 MILLIGRAM(S): at 06:28

## 2023-11-27 NOTE — PROGRESS NOTE ADULT - ASSESSMENT
68 year old man with obesity, HTN, HLD, HFpEF, moderate mitral valve regurgitation, permanent afib, s/p Watchman, PAD s/p L BKA, VIJAY nonadherent to CPAP, OHS, COPD, pHTN, chronic hypoxic and hypercapneic respiratory failure on home O2, hemorrhoids s/p ligation earlier this year, chronic anemia, cirrhosis likely related to alcohol use and fatty liver due to obesity, last admitted to  in July for CHF exacerbation, presented 11/17 with complaint of sudden onset of dizziness, vertigo, mild headache, nausea. ED vitals were normal. Exam was unremarkable aside for his known obese body habitus, L BKA. WBC 8.4. Hgb 10.8. BMP, LFTs WNL aside for mild hypoalbuminemia. Troponin negative. EKG afib, rate 57, no acute changes. UA with glucosuria and trace ketones. CT head w/ no apparent acute infarct, hemorrhage or mass. No hydrocephalus. Chronic appearing white matter hypodensities and volume loss. CTA head with no flow-limiting stenosis, occlusion or aneurysm. CTA neck with moderate, 60%, narrowing right ICA origin. No other significant arterial narrowing in the neck. Patient was given meclizine and admitted to Medicine with Neurology consult to advise on whether additional neuroimaging ought to be pursued.     Dizziness, vertigo    His neurological examination was non-focal. CT imaging in the ED as described above. Neurology input was appreciated. MRI brain was obtained for completeness and MRI did showed a tiny acute infarct in the right frontal precentral gyrus, though this may not necessarily be the cause of dizziness. See below.  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 Appreciate Neurology input. Has known afib. Not on AC as patient is s/p Watchman. MYA done 11/20. Estimated LVEF is 60-65%. Left atrial occlusion device present with small jose-device leak. No evidence of jose-device thrombus. Regarding CT-reported R ICA stenosis, US duplex B/L carotid arteries was done. Found no significant hemodynamic stenosis of either carotid artery. Also, worth noting that his dizziness is unlikely from R ICA stenosis.   - Continue on aspirin, continue Plavix  - Continue high intensity statin  - Continue PT/OT    Non occlusive R ICA stenosis  Appreciate Vascular Surgery input.   - Continue antiplatelet, statin  - Outpatient follow up with Dr Perez.     Chronic respiratory failure with hypoxia and hypercapnia.   Likely multi factorial due to OHS, VIJAY nonadherent to CPAP, mod pHTN, CHF and now increasing ground glass lung opacities on CT imaging of the chest, see below.   - Continue to treat underlying issues  - Continue oxygen supplementation  - Continue to encourage NIPPV use but patient is deferring    Worsening pulmonary opacities  CT chest with increased ground glass opacity compared to prior studies and may be infectious/inflammatory (including active   interstitial lung disease) versus fluid overload. Appreciate Pulmonary Medicine input. Patient treated with empiric IV steroids, bronchodilators. Feeling somewhat improved, likely getting closer to baseline. Sent off inflammatory marker and auto-immune labs. ESR 78, RF <10, total hemolytic complement 74, C3 140, C4 20, scleroderma <0.2, SSA/SSB <0.2.  - Continue steroids, now PO prednisone  - Repeat CT scan in 3 months and if persistent, then will discuss about biopsy.   - Repeat sleep study as outpatient    COPD, unable to rule out exacerbation  Appreciate input from Pulmonary Medicine. Tapered from IV to PO steroids  - Continue steroids and bronchodilators  - Follow up with Pulmonary as outpatient    Chronic diastolic CHF, unable to rule out acute component given CT chest findings   Hx of recurrent CHF exacerbations for HFpEF. MYA from this admission reviewed. Normal left ventricular size and systolic function. Estimated LVEF is 60-65%. Left atrial occlusion device present with small jose-device leak. No evidence of jose-device thrombus. Moderate mitral and tricuspid regurgitation. Mild fluid overload on exam. BNP elevated on admission 4176, now downtrending 3031.   - Continue Lasix 40 IV BID  - Continue metoprolol, spironolactone, doxazosin, diltiazem (rate control)  - Daily standing weights  - Is and Os    Permanent atrial fibrillation  Rate controlled; on metoprolol and diltiazem. CHADS2-VASC>2, AC contraindicated due to alcohol use disorder and frequent falls. S/p Watchman implanted Apr '19 at [facility]. MYA showed LA occlusion device present with small jose-device leak. No evidence of jose-device thrombus  - Cont. metoprolol, diltiazem for rate control.  - Not on AC (has RUDOLPH occlusion device)        DVT px: LMWH daily  Code status: DNR, DNI, trial NIV  Dispo: Anticipate DC home when clinically improved an inpatient workup is complete possibly in 24-48 hrs

## 2023-11-27 NOTE — PROGRESS NOTE ADULT - ASSESSMENT
- cont O2  - s/p MYA for afib  - has normal EF w moderate pulmonary htn  - d/c nebs and start symbicort; cont spiriva  - d/c solumedrol and change to prednisone  - on lovenox for dvt  - ? full anticoagulation for afib

## 2023-11-27 NOTE — PROGRESS NOTE ADULT - SUBJECTIVE AND OBJECTIVE BOX
Chief Complaint: Dizziness, vertigo, mild headache    Interval Hx: Patient seen and examined. Patient resting comfortably at edge of bed, eating lunch. Feeling overall improved since admission. Still having some chest congestion and ongoing dyspnea with light exertion, but this is a chronic problem of his. No chest pain. No fevers or rigors. Generalized weakness and malaise. No other complaints. Pulmonary Medicine has de-escalated his steroids to PO.     ROS: Multi system review is comprehensively negative x 10 systems except as above    Vitals:  T(F): 97.4 (27 Nov 2023 08:33), Max: 98.1 (26 Nov 2023 20:49)  HR: 88 (27 Nov 2023 14:15) (60 - 102)  BP: 109/61 (27 Nov 2023 14:15) (109/61 - 144/64)  RR: 18 (27 Nov 2023 08:33) (18 - 18)  SpO2: 97% (27 Nov 2023 09:33) (92% - 100%) on O2 via NC 3L/min (same as home)    Exam:  Gen: No acute distress  HEENT: NCAT PERRL EOMI MMM clear oropharynx  Neck: Supple, no LAD, no C spine tenderness, no nuchal rigidity  CVS: s1 s2 normal, RRR  Chest: Normal resp effort, diminished breath sounds B/L, +wheeze  Abd: +B, soft, NT ND   Ext: RLE edema, chronic skin hyperpigmentation, LLE BKA.  Skin: Warm, dry  Mood: Calm, pleasant  Neuro: AOx3, no gross focal deficits    Labs:               10.8  12.55 )--------( 162             34.8      137  |  102  |  22  -----------------------<  152  4.1   |   29   |  0.76    Ca 9.0       ESR 78  RF < 10    Total hemolytic complement 74    C3 140    C4 20    Scleroderma <0.2   SSA/SSB <0.2    ProBNP 913   A1c 5.6   LDL 66  HDL 40  Triglyceride 60    Imaging:  CT chest WO 11/24: Tracheal bronchial secretions.  Peripheral reticulation with associated traction bronchiectasis and patchy opacity   that is increased from the prior study. No honeycombing. Mildly enlarged mediastinal lymph nodes. The visualized portion of the thyroid gland is heterogeneous. There is no pleural effusion. There is no pneumothorax. There is cardiomegaly.  There are atherosclerotic   calcifications of the aorta and coronary arteries.  There is no pericardial effusion.  Left atrial appendage closure device. Images of the upper abdomen demonstrate re-demonstrated splenic hypodensity. The bones are unremarkable.  The soft tissues are unremarkable.    CXR 11/19: Frontal expiratory view of the chest shows the heart to be similarly enlarged in size. The lungs show clear right lung with small left lower lobe infiltrate and there is no evidence of pneumothorax nor definite pleural effusion.    MR brain WO 11/18: Ventricular and sulcal prominence is consistent with age-appropriate involutional change. Small vessel white matter ischemic changes are noted. There is a tiny acute infarct in a high right frontal cortex along the precentral gyrus, new since 11/7/2017. No acute hemorrhage is identified. The sellar and parasellar structures are unremarkable. There is mild mucosal thickening of the ethmoid sinuses.    US duplex B/L carotid arteries 11/18: No significant hemodynamic stenosis of either carotid artery.    CTA neck W/ IV cont 11/18: Moderate, 60%, narrowing right ICA origin. No other significant arterial narrowing in the neck.    CTA head w/ IV cont 11/18: No flow-limiting stenosis, occlusion or aneurysm.    CT head 11/18: No apparent acute infarct, hemorrhage or mass. No hydrocephalus. Chronic appearing white matter hypodensities and volume loss.    Cardiac Testing:  MYA 11/20: Normal left ventricular size and systolic function. Estimated LVEF is 60-65%. Left atrial occlusion device present with small jose-device leak. No evidence of jose-device thrombus. Moderate mitral and tricuspid regurgitation.    EKG 11/17: Afib, rate 57, no acute changes    Meds:  MEDICATIONS  (STANDING):  aspirin enteric coated 81 milliGRAM(s) Oral daily  atorvastatin 80 milliGRAM(s) Oral at bedtime  budesonide 160 MICROgram(s)/formoterol 4.5 MICROgram(s) Inhaler 2 Puff(s) Inhalation two times a day  busPIRone 10 milliGRAM(s) Oral two times a day  clopidogrel Tablet 75 milliGRAM(s) Oral daily  cyanocobalamin 1000 MICROGram(s) Oral daily  diltiazem    milliGRAM(s) Oral daily  doxazosin 1 milliGRAM(s) Oral at bedtime  enoxaparin Injectable 40 milliGRAM(s) SubCutaneous every 24 hours  folic acid 1 milliGRAM(s) Oral daily  furosemide   Injectable 40 milliGRAM(s) IV Push two times a day  gabapentin 400 milliGRAM(s) Oral three times a day  influenza  Vaccine (HIGH DOSE) 0.7 milliLiter(s) IntraMuscular once  metoprolol tartrate 25 milliGRAM(s) Oral two times a day  multivitamin 1 Tablet(s) Oral daily  pantoprazole    Tablet 40 milliGRAM(s) Oral before breakfast  predniSONE   Tablet 40 milliGRAM(s) Oral daily  psyllium Powder 2 Packet(s) Oral daily  QUEtiapine 100 milliGRAM(s) Oral at bedtime  spironolactone 50 milliGRAM(s) Oral daily  thiamine 100 milliGRAM(s) Oral daily  tiotropium 2.5 MICROgram(s) Inhaler 2 Puff(s) Inhalation daily  zaleplon 5 milliGRAM(s) Oral at bedtime    MEDICATIONS  (PRN):  acetaminophen     Tablet .. 650 milliGRAM(s) Oral every 6 hours PRN Mild Pain (1 - 3)  albuterol    90 MICROgram(s) HFA Inhaler 1 Puff(s) Inhalation every 6 hours PRN Shortness of Breath and/or Wheezing  benzocaine/menthol Lozenge 1 Lozenge Oral every 6 hours PRN Sore Throat  meclizine 25 milliGRAM(s) Oral every 8 hours PRN Dizziness  ondansetron Injectable 4 milliGRAM(s) IV Push every 6 hours PRN Nausea and/or Vomiting

## 2023-11-27 NOTE — PROGRESS NOTE ADULT - SUBJECTIVE AND OBJECTIVE BOX
Subjective:  awake and alert; no distress  c/o chest congestion    MEDICATIONS  (STANDING):  albuterol/ipratropium for Nebulization 3 milliLiter(s) Nebulizer every 6 hours  aspirin enteric coated 81 milliGRAM(s) Oral daily  atorvastatin 80 milliGRAM(s) Oral at bedtime  buDESOnide    Inhalation Suspension 0.5 milliGRAM(s) Inhalation every 12 hours  busPIRone 10 milliGRAM(s) Oral two times a day  clopidogrel Tablet 75 milliGRAM(s) Oral daily  cyanocobalamin 1000 MICROGram(s) Oral daily  diltiazem    milliGRAM(s) Oral daily  doxazosin 1 milliGRAM(s) Oral at bedtime  enoxaparin Injectable 40 milliGRAM(s) SubCutaneous every 24 hours  folic acid 1 milliGRAM(s) Oral daily  furosemide   Injectable 40 milliGRAM(s) IV Push two times a day  gabapentin 400 milliGRAM(s) Oral three times a day  influenza  Vaccine (HIGH DOSE) 0.7 milliLiter(s) IntraMuscular once  methylPREDNISolone sodium succinate Injectable 40 milliGRAM(s) IV Push two times a day  metoprolol tartrate 25 milliGRAM(s) Oral two times a day  multivitamin 1 Tablet(s) Oral daily  pantoprazole    Tablet 40 milliGRAM(s) Oral before breakfast  psyllium Powder 2 Packet(s) Oral daily  QUEtiapine 100 milliGRAM(s) Oral at bedtime  spironolactone 50 milliGRAM(s) Oral daily  thiamine 100 milliGRAM(s) Oral daily  tiotropium 2.5 MICROgram(s) Inhaler 2 Puff(s) Inhalation daily  zaleplon 5 milliGRAM(s) Oral at bedtime    MEDICATIONS  (PRN):  acetaminophen     Tablet .. 650 milliGRAM(s) Oral every 6 hours PRN Mild Pain (1 - 3)  albuterol    90 MICROgram(s) HFA Inhaler 2 Puff(s) Inhalation every 6 hours PRN Shortness of Breath and/or Wheezing  benzocaine/menthol Lozenge 1 Lozenge Oral every 6 hours PRN Sore Throat  meclizine 25 milliGRAM(s) Oral every 8 hours PRN Dizziness  ondansetron Injectable 4 milliGRAM(s) IV Push every 6 hours PRN Nausea and/or Vomiting      Allergies    Duricef (Rash)  Ceclor (Rash)    Intolerances        REVIEW OF SYSTEMS:    CONSTITUTIONAL:  As per HPI.  HEENT:  Eyes:  No diplopia or blurred vision. ENT:  No earache, sore throat or runny nose.  CARDIOVASCULAR:  No pressure, squeezing, tightness, heaviness or aching about the chest, neck, axilla or epigastrium.  RESPIRATORY:  No cough, shortness of breath, PND or orthopnea.  GASTROINTESTINAL:  No nausea, vomiting or diarrhea.  GENITOURINARY:  No dysuria, frequency or urgency.  MUSCULOSKELETAL:  no joint pain, deformity, tenderness  EXTREMITIES: no clubbing cyanosis,edema  SKIN:  No change in skin, hair or nails.  NEUROLOGIC:  No paresthesias, fasciculations, seizures or weakness.  PSYCHIATRIC:  No disorder of thought or mood.  ENDOCRINE:  No heat or cold intolerance, polyuria or polydipsia.  HEMATOLOGICAL:  No easy bruising or bleedings:    Vital Signs Last 24 Hrs  T(C): 36.7 (26 Nov 2023 20:49), Max: 36.8 (26 Nov 2023 08:26)  T(F): 98.1 (26 Nov 2023 20:49), Max: 98.2 (26 Nov 2023 08:26)  HR: 62 (27 Nov 2023 06:30) (60 - 73)  BP: 144/64 (27 Nov 2023 06:30) (122/65 - 144/64)  BP(mean): --  RR: 18 (26 Nov 2023 20:49) (18 - 18)  SpO2: 100% (27 Nov 2023 02:02) (98% - 100%)    Parameters below as of 27 Nov 2023 02:02  Patient On (Oxygen Delivery Method): nasal cannula, 4 Lpm        PHYSICAL EXAMINATION:  SKIN: no rashes  HEAD: NC/AT  NECK:  Supple. No lymphadenopathy. Jugular venous pressure not elevated. Carotids equal.   HEART:  S1S2 irreg  CHEST: bibasilar crackles w decreassed bs bases  ABDOMEN:  Soft and nontender.   EXTREMITIES:  RLE edema improved; left BKA  NEURO: AAO x 3, no focal deficts       LABS:                RADIOLOGY & ADDITIONAL TESTS:

## 2023-11-28 ENCOUNTER — RX RENEWAL (OUTPATIENT)
Age: 68
End: 2023-11-28

## 2023-11-28 PROCEDURE — 99232 SBSQ HOSP IP/OBS MODERATE 35: CPT

## 2023-11-28 RX ADMIN — Medication 81 MILLIGRAM(S): at 10:34

## 2023-11-28 RX ADMIN — Medication 5 MILLIGRAM(S): at 21:05

## 2023-11-28 RX ADMIN — QUETIAPINE FUMARATE 100 MILLIGRAM(S): 200 TABLET, FILM COATED ORAL at 21:05

## 2023-11-28 RX ADMIN — Medication 100 MILLIGRAM(S): at 13:28

## 2023-11-28 RX ADMIN — GABAPENTIN 400 MILLIGRAM(S): 400 CAPSULE ORAL at 13:28

## 2023-11-28 RX ADMIN — Medication 650 MILLIGRAM(S): at 10:34

## 2023-11-28 RX ADMIN — Medication 25 MILLIGRAM(S): at 10:33

## 2023-11-28 RX ADMIN — Medication 650 MILLIGRAM(S): at 11:10

## 2023-11-28 RX ADMIN — TIOTROPIUM BROMIDE 2 PUFF(S): 18 CAPSULE ORAL; RESPIRATORY (INHALATION) at 09:15

## 2023-11-28 RX ADMIN — Medication 180 MILLIGRAM(S): at 10:32

## 2023-11-28 RX ADMIN — ALBUTEROL 1 PUFF(S): 90 AEROSOL, METERED ORAL at 20:30

## 2023-11-28 RX ADMIN — ENOXAPARIN SODIUM 40 MILLIGRAM(S): 100 INJECTION SUBCUTANEOUS at 06:20

## 2023-11-28 RX ADMIN — Medication 1 MILLIGRAM(S): at 21:04

## 2023-11-28 RX ADMIN — PREGABALIN 1000 MICROGRAM(S): 225 CAPSULE ORAL at 10:33

## 2023-11-28 RX ADMIN — Medication 10 MILLIGRAM(S): at 13:26

## 2023-11-28 RX ADMIN — Medication 650 MILLIGRAM(S): at 21:06

## 2023-11-28 RX ADMIN — Medication 1 TABLET(S): at 10:33

## 2023-11-28 RX ADMIN — BUDESONIDE AND FORMOTEROL FUMARATE DIHYDRATE 2 PUFF(S): 160; 4.5 AEROSOL RESPIRATORY (INHALATION) at 20:30

## 2023-11-28 RX ADMIN — Medication 100 MILLIGRAM(S): at 10:32

## 2023-11-28 RX ADMIN — Medication 40 MILLIGRAM(S): at 13:26

## 2023-11-28 RX ADMIN — GABAPENTIN 400 MILLIGRAM(S): 400 CAPSULE ORAL at 21:05

## 2023-11-28 RX ADMIN — CLOPIDOGREL BISULFATE 75 MILLIGRAM(S): 75 TABLET, FILM COATED ORAL at 10:34

## 2023-11-28 RX ADMIN — Medication 1 MILLIGRAM(S): at 10:34

## 2023-11-28 RX ADMIN — Medication 100 MILLIGRAM(S): at 21:05

## 2023-11-28 RX ADMIN — BUDESONIDE AND FORMOTEROL FUMARATE DIHYDRATE 2 PUFF(S): 160; 4.5 AEROSOL RESPIRATORY (INHALATION) at 09:16

## 2023-11-28 RX ADMIN — Medication 40 MILLIGRAM(S): at 13:25

## 2023-11-28 RX ADMIN — Medication 40 MILLIGRAM(S): at 06:20

## 2023-11-28 RX ADMIN — PANTOPRAZOLE SODIUM 40 MILLIGRAM(S): 20 TABLET, DELAYED RELEASE ORAL at 06:20

## 2023-11-28 RX ADMIN — Medication 25 MILLIGRAM(S): at 21:05

## 2023-11-28 RX ADMIN — Medication 650 MILLIGRAM(S): at 22:00

## 2023-11-28 RX ADMIN — GABAPENTIN 400 MILLIGRAM(S): 400 CAPSULE ORAL at 06:20

## 2023-11-28 RX ADMIN — ATORVASTATIN CALCIUM 80 MILLIGRAM(S): 80 TABLET, FILM COATED ORAL at 21:05

## 2023-11-28 RX ADMIN — Medication 10 MILLIGRAM(S): at 21:05

## 2023-11-28 RX ADMIN — BENZOCAINE AND MENTHOL 1 LOZENGE: 5; 1 LIQUID ORAL at 04:20

## 2023-11-28 RX ADMIN — SPIRONOLACTONE 50 MILLIGRAM(S): 25 TABLET, FILM COATED ORAL at 10:34

## 2023-11-28 NOTE — CHART NOTE - NSCHARTNOTEFT_GEN_A_CORE
Met with patient, daughter at bedside and patient's ex-wife and other daughter via phone on 11/22 to discuss GOC. Patient's daughter requesting to speak with palliative as she felt her father is suffering and does not have quality of life. After meeting with patient and family at bedside, patient feels as though he is not suffering and has good quality of life, is happy with the care he receives in the hospital and is ok with coming back and forth to the hospital as needed. Discussed chronic, progressive illnesses of CHF and COPD and option for hospice in future if patient would like to transition his care to focus on comfort. Patient not ready for hospice at this time, has MOLST with DNR/DNI trial NIV on chart. GOC clear. Palliative will sign off. Please re-consult if needed.

## 2023-11-28 NOTE — PROGRESS NOTE ADULT - ASSESSMENT
68 year-old man with obesity, HTN, HLD, HFpEF, moderate mitral valve regurgitation, permanent afib, s/p Watchman, PAD s/p L BKA, VIJAY nonadherent to CPAP, OHS, COPD, pHTN, chronic hypoxic and hypercapneic respiratory failure on home O2, hemorrhoids s/p ligation earlier this year, chronic anemia, cirrhosis likely related to alcohol use and fatty liver due to obesity, last admitted to  in July for CHF exacerbation, presented 11/17 with complaint of sudden onset of dizziness, vertigo, mild headache, nausea. ED vitals were normal. Exam was unremarkable aside for his known obese body habitus, L BKA. WBC 8.4. Hgb 10.8. BMP, LFTs WNL aside for mild hypoalbuminemia. Troponin negative. EKG afib, rate 57, no acute changes. UA with glucosuria and trace ketones. CT head w/ no apparent acute infarct, hemorrhage or mass. No hydrocephalus. Chronic appearing white matter hypodensities and volume loss. CTA head with no flow-limiting stenosis, occlusion or aneurysm. CTA neck with moderate, 60%, narrowing right ICA origin. No other significant arterial narrowing in the neck. Patient was given meclizine and admitted to Medicine with Neurology consult to advise on whether additional neuroimaging ought to be pursued.     Dizziness, vertigo    His neurological examination was non-focal. CT imaging in the ED as described above. Neurology input was appreciated. MRI brain was obtained for completeness and MRI did showed a tiny acute infarct in the right frontal precentral gyrus, though this may not necessarily be the cause of dizziness. See below.  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 Appreciate Neurology input. Has known afib. Not on AC as patient is s/p Watchman. MYA done 11/20. Estimated LVEF is 60-65%. Left atrial occlusion device present with small jose-device leak. No evidence of jose-device thrombus. Regarding CT-reported R ICA stenosis, US duplex B/L carotid arteries was done. Found no significant hemodynamic stenosis of either carotid artery. Also, worth noting that his dizziness is unlikely from R ICA stenosis.   - Continue on aspirin, continue Plavix  - Continue high intensity statin  - Continue PT/OT    Non occlusive R ICA stenosis  Appreciate Vascular Surgery input.   - Continue antiplatelet, statin  - Outpatient follow up with Dr Perez.     Chronic respiratory failure with hypoxia and hypercapnia.   Likely multi factorial due to OHS, VIJAY nonadherent to CPAP, mod pHTN, CHF and now increasing ground glass lung opacities on CT imaging of the chest, see below.   - Continue to treat underlying issues  - Continue oxygen supplementation  - Continue to encourage NIPPV use but patient is deferring    Worsening pulmonary opacities  CT chest with increased ground glass opacity compared to prior studies and may be infectious/inflammatory (including active   interstitial lung disease) versus fluid overload. Appreciate Pulmonary Medicine input. Patient treated with empiric IV steroids, bronchodilators. Feeling somewhat improved, likely getting closer to baseline. Sent off inflammatory marker and auto-immune labs. ESR 78, RF <10, total hemolytic complement 74, C3 140, C4 20, scleroderma <0.2, SSA/SSB <0.2.  - Continue steroids, now PO prednisone  - Repeat CT scan in 3 months and if persistent, then will discuss about biopsy.   - Repeat sleep study as outpatient    COPD, unable to rule out exacerbation  Appreciate input from Pulmonary Medicine. Tapered from IV to PO steroids. Cough is quite productive, however. Sputum thick and grayish-green.   - Add doxycycline 100mg BID, day 1   - Continue steroids and bronchodilators  - Follow up with Pulmonary as outpatient    Chronic diastolic CHF, unable to rule out acute component given CT chest findings   Hx of recurrent CHF exacerbations for HFpEF. MYA from this admission reviewed. Normal left ventricular size and systolic function. Estimated LVEF is 60-65%. Left atrial occlusion device present with small jose-device leak. No evidence of jose-device thrombus. Moderate mitral and tricuspid regurgitation. Mild fluid overload on exam. BNP elevated on admission 4176, now downtrending 3031.   - Continue Lasix 40 IV BID, likely transition to PO tomorrow  - Continue metoprolol, spironolactone, doxazosin, diltiazem (rate control)  - Daily standing weights  - Is and Os    Permanent atrial fibrillation  Rate controlled; on metoprolol and diltiazem. CHADS2-VASC>2, AC contraindicated due to alcohol use disorder and frequent falls. S/p Watchman implanted [facility]. MYA showed LA occlusion device present with small jose-device leak. No evidence of jose-device thrombus  - Cont. metoprolol, diltiazem for rate control.  - Not on AC (has RUDOLPH occlusion device)        DVT px: LMWH daily  Code status: DNR, DNI, trial NIV  Dispo: Anticipate DC home when clinically improved an inpatient workup is complete possibly in 24-48 hrs

## 2023-11-28 NOTE — PROGRESS NOTE ADULT - SUBJECTIVE AND OBJECTIVE BOX
Chief Complaint: Dizziness, vertigo, mild headache    Interval Hx: Patient seen and examined. Feeling overall improved since admission, no longer with significant vertigo. He is however coughing, bringing up thick grayish-green sputum. No fever or chills. No chest pain or tightness. He has dyspnea on exertion, but he states that this is improving the last few days and now similar to his baseline. Doxycycline was added today given the thick, discolored sputum.     ROS: Multi system review is comprehensively negative x 10 systems except as above    Vitals:  T(F): 97.9 (28 Nov 2023 07:44), Max: 97.9 (28 Nov 2023 07:44)  HR: 81 (28 Nov 2023 13:19) (81 - 110)  BP: 122/57 (28 Nov 2023 13:19) (101/66 - 130/74)  RR: 18 (28 Nov 2023 07:44) (18 - 18)  SpO2: 93% (28 Nov 2023 09:17) (92% - 95%) on 4L/min via NC (on home O2)    Exam:  Gen: No acute distress  HEENT: NCAT PERRL EOMI MMM clear oropharynx  Neck: Supple, no LAD, no C spine tenderness, no nuchal rigidity  CVS: s1 s2 normal, RRR  Chest: Normal resp effort, diminished breath sounds B/L, +wheeze  Abd: +B, soft, NT ND   Ext: RLE edema, chronic skin hyperpigmentation, LLE BKA.  Skin: Warm, dry  Mood: Calm, pleasant  Neuro: AOx3, no gross focal deficits    Labs:               10.8  12.55 )--------( 162             34.8      137  |  102  |  22  -----------------------<  152  4.1   |   29   |  0.76    Ca 9.0       ESR 78  RF < 10    Total hemolytic complement 74    C3 140    C4 20    Scleroderma <0.2   SSA/SSB <0.2    ProBNP 913   A1c 5.6   LDL 66  HDL 40  Triglyceride 60    Imaging:  CT chest WO 11/24: Tracheal bronchial secretions. Peripheral reticulation with associated traction bronchiectasis and patchy opacity   that is increased from the prior study. No honeycombing. Mildly enlarged mediastinal lymph nodes. The visualized portion of the thyroid gland is heterogeneous. There is no pleural effusion. There is no pneumothorax. There is cardiomegaly.  There are atherosclerotic   calcifications of the aorta and coronary arteries.  There is no pericardial effusion.  Left atrial appendage closure device. Images of the upper abdomen demonstrate re-demonstrated splenic hypodensity. The bones are unremarkable.  The soft tissues are unremarkable.    CXR 11/19: Frontal expiratory view of the chest shows the heart to be similarly enlarged in size. The lungs show clear right lung with small left lower lobe infiltrate and there is no evidence of pneumothorax nor definite pleural effusion.    MR brain WO 11/18: Ventricular and sulcal prominence is consistent with age-appropriate involutional change. Small vessel white matter ischemic changes are noted. There is a tiny acute infarct in a high right frontal cortex along the precentral gyrus, new since 11/7/2017. No acute hemorrhage is identified. The sellar and parasellar structures are unremarkable. There is mild mucosal thickening of the ethmoid sinuses.    US duplex B/L carotid arteries 11/18: No significant hemodynamic stenosis of either carotid artery.    CTA neck W/ IV cont 11/18: Moderate, 60%, narrowing right ICA origin. No other significant arterial narrowing in the neck.    CTA head w/ IV cont 11/18: No flow-limiting stenosis, occlusion or aneurysm.    CT head 11/18: No apparent acute infarct, hemorrhage or mass. No hydrocephalus. Chronic appearing white matter hypodensities and volume loss.    Cardiac Testing:  MYA 11/20: Normal left ventricular size and systolic function. Estimated LVEF is 60-65%. Left atrial occlusion device present with small jose-device leak. No evidence of jose-device thrombus. Moderate mitral and tricuspid regurgitation.    EKG 11/17: Afib, rate 57, no acute changes    Meds:  MEDICATIONS  (STANDING):  aspirin enteric coated 81 milliGRAM(s) Oral daily  atorvastatin 80 milliGRAM(s) Oral at bedtime  budesonide 160 MICROgram(s)/formoterol 4.5 MICROgram(s) Inhaler 2 Puff(s) Inhalation two times a day  busPIRone 10 milliGRAM(s) Oral two times a day  clopidogrel Tablet 75 milliGRAM(s) Oral daily  cyanocobalamin 1000 MICROGram(s) Oral daily  diltiazem    milliGRAM(s) Oral daily  doxazosin 1 milliGRAM(s) Oral at bedtime  doxycycline monohydrate Capsule 100 milliGRAM(s) Oral every 12 hours  enoxaparin Injectable 40 milliGRAM(s) SubCutaneous every 24 hours  folic acid 1 milliGRAM(s) Oral daily  furosemide   Injectable 40 milliGRAM(s) IV Push two times a day  gabapentin 400 milliGRAM(s) Oral three times a day  influenza  Vaccine (HIGH DOSE) 0.7 milliLiter(s) IntraMuscular once  metoprolol tartrate 25 milliGRAM(s) Oral two times a day  multivitamin 1 Tablet(s) Oral daily  pantoprazole    Tablet 40 milliGRAM(s) Oral before breakfast  predniSONE   Tablet 40 milliGRAM(s) Oral daily  psyllium Powder 2 Packet(s) Oral daily  QUEtiapine 100 milliGRAM(s) Oral at bedtime  spironolactone 50 milliGRAM(s) Oral daily  thiamine 100 milliGRAM(s) Oral daily  tiotropium 2.5 MICROgram(s) Inhaler 2 Puff(s) Inhalation daily  zaleplon 5 milliGRAM(s) Oral at bedtime    MEDICATIONS  (PRN):  acetaminophen     Tablet .. 650 milliGRAM(s) Oral every 6 hours PRN Mild Pain (1 - 3)  albuterol    90 MICROgram(s) HFA Inhaler 1 Puff(s) Inhalation every 6 hours PRN Shortness of Breath and/or Wheezing  benzocaine/menthol Lozenge 1 Lozenge Oral every 6 hours PRN Sore Throat  meclizine 25 milliGRAM(s) Oral every 8 hours PRN Dizziness  ondansetron Injectable 4 milliGRAM(s) IV Push every 6 hours PRN Nausea and/or Vomiting

## 2023-11-29 ENCOUNTER — TRANSCRIPTION ENCOUNTER (OUTPATIENT)
Age: 68
End: 2023-11-29

## 2023-11-29 ENCOUNTER — APPOINTMENT (OUTPATIENT)
Dept: FAMILY MEDICINE | Facility: CLINIC | Age: 68
End: 2023-11-29

## 2023-11-29 VITALS — OXYGEN SATURATION: 97 %

## 2023-11-29 PROCEDURE — 99239 HOSP IP/OBS DSCHRG MGMT >30: CPT

## 2023-11-29 PROCEDURE — 99232 SBSQ HOSP IP/OBS MODERATE 35: CPT

## 2023-11-29 RX ORDER — FUROSEMIDE 40 MG
40 TABLET ORAL DAILY
Refills: 0 | Status: DISCONTINUED | OUTPATIENT
Start: 2023-11-29 | End: 2023-11-29

## 2023-11-29 RX ORDER — ATORVASTATIN CALCIUM 80 MG/1
1 TABLET, FILM COATED ORAL
Qty: 30 | Refills: 0
Start: 2023-11-29 | End: 2023-12-28

## 2023-11-29 RX ORDER — MECLIZINE HCL 12.5 MG
1 TABLET ORAL
Qty: 30 | Refills: 0
Start: 2023-11-29 | End: 2023-12-08

## 2023-11-29 RX ORDER — CLOPIDOGREL BISULFATE 75 MG/1
1 TABLET, FILM COATED ORAL
Qty: 10 | Refills: 0
Start: 2023-11-29 | End: 2023-12-08

## 2023-11-29 RX ADMIN — CLOPIDOGREL BISULFATE 75 MILLIGRAM(S): 75 TABLET, FILM COATED ORAL at 09:27

## 2023-11-29 RX ADMIN — ENOXAPARIN SODIUM 40 MILLIGRAM(S): 100 INJECTION SUBCUTANEOUS at 06:05

## 2023-11-29 RX ADMIN — BUDESONIDE AND FORMOTEROL FUMARATE DIHYDRATE 2 PUFF(S): 160; 4.5 AEROSOL RESPIRATORY (INHALATION) at 10:38

## 2023-11-29 RX ADMIN — Medication 81 MILLIGRAM(S): at 09:22

## 2023-11-29 RX ADMIN — Medication 2 PACKET(S): at 09:37

## 2023-11-29 RX ADMIN — Medication 40 MILLIGRAM(S): at 09:28

## 2023-11-29 RX ADMIN — Medication 100 MILLIGRAM(S): at 09:28

## 2023-11-29 RX ADMIN — Medication 180 MILLIGRAM(S): at 09:26

## 2023-11-29 RX ADMIN — Medication 1 TABLET(S): at 09:26

## 2023-11-29 RX ADMIN — Medication 40 MILLIGRAM(S): at 12:57

## 2023-11-29 RX ADMIN — Medication 40 MILLIGRAM(S): at 06:05

## 2023-11-29 RX ADMIN — Medication 100 MILLIGRAM(S): at 09:36

## 2023-11-29 RX ADMIN — GABAPENTIN 400 MILLIGRAM(S): 400 CAPSULE ORAL at 06:06

## 2023-11-29 RX ADMIN — Medication 1 MILLIGRAM(S): at 09:27

## 2023-11-29 RX ADMIN — Medication 10 MILLIGRAM(S): at 09:21

## 2023-11-29 RX ADMIN — PREGABALIN 1000 MICROGRAM(S): 225 CAPSULE ORAL at 09:29

## 2023-11-29 RX ADMIN — SPIRONOLACTONE 50 MILLIGRAM(S): 25 TABLET, FILM COATED ORAL at 09:27

## 2023-11-29 RX ADMIN — GABAPENTIN 400 MILLIGRAM(S): 400 CAPSULE ORAL at 12:56

## 2023-11-29 RX ADMIN — TIOTROPIUM BROMIDE 2 PUFF(S): 18 CAPSULE ORAL; RESPIRATORY (INHALATION) at 10:38

## 2023-11-29 RX ADMIN — PANTOPRAZOLE SODIUM 40 MILLIGRAM(S): 20 TABLET, DELAYED RELEASE ORAL at 06:06

## 2023-11-29 RX ADMIN — Medication 25 MILLIGRAM(S): at 09:26

## 2023-11-29 RX ADMIN — Medication 650 MILLIGRAM(S): at 06:50

## 2023-11-29 RX ADMIN — Medication 650 MILLIGRAM(S): at 06:05

## 2023-11-29 NOTE — DISCHARGE NOTE PROVIDER - NSDCCAREPROVSEEN_GEN_ALL_CORE_FT
Rasheeda Hackett (hospital medicine)  Wicho Mckenna (pulmonology)  Lyly Copeland (cardiology)  Diego Perez (vascular surgery)

## 2023-11-29 NOTE — DISCHARGE NOTE PROVIDER - NSDCFUSCHEDAPPT_GEN_ALL_CORE_FT
Dominick Middleton  [facility] Physician [facility]  CARDIOLOGY [address]  Scheduled Appointment: 12/05/2023    Elizabeth Garcia  [facility] Physician [facility]  COLOSURG [address]  Scheduled Appointment: 12/11/2023    Ken Oliva  [facility] Physician [facility]  HEARTFAIL [address]  Scheduled Appointment: 01/02/2024    Wicho Mckenna  [facility] Physician [facility]  INTMED [address]  Scheduled Appointment: 01/04/2024    Augusta Juarez  [facility] Physician [facility]  FAMILYMED [address]  Scheduled Appointment: 01/08/2024

## 2023-11-29 NOTE — DISCHARGE NOTE PROVIDER - CARE PROVIDER_API CALL
Augusta Vasquez  [facility]  [address] [address]  Phone: [phone]  Fax: [phone]  Follow Up Time: 1 week    Wicho Mckenna  Pulmonary Disease  [address], [address]  [place] [address]  Phone: [phone]  Fax: [phone]  Follow Up Time: 1 week    Diego Perez  Vascular Surgery  [address]  [place] [address]  Phone: [phone]  Fax: [phone]  Follow Up Time: 2 weeks    Adelaide Gurrola  Neurology  [address], [address]  [place] [address]  Phone: [phone]  Fax: [phone]  Follow Up Time: 2 weeks

## 2023-11-29 NOTE — DISCHARGE NOTE PROVIDER - REASON FOR ADMISSION
I spoke with Luis Alberto Lopes, Ms. Nova's daughter, to schedule a tcm follow-up with Dr. Mao and labs.  I did offer her 4/18 or 4/19, but both days the daughter was busy and could not bring her mother.  She was driving at the time I called and said she would call back tomorrow.     Persistent Vertigo

## 2023-11-29 NOTE — DISCHARGE NOTE PROVIDER - HOSPITAL COURSE
CC: vertigo  HPI and Hospital Course: 68 year-old man with obesity, HTN, HLD, HFpEF, moderate mitral valve regurgitation, permanent afib, s/p Watchman, PAD s/p L BKA, VIJYA nonadherent to CPAP, OHS, COPD, pHTN, chronic hypoxic and hypercapneic respiratory failure on home O2, hemorrhoids s/p ligation earlier this year, chronic anemia, cirrhosis likely related to alcohol use and fatty liver due to obesity, last admitted to  in July for CHF exacerbation, presented 11/17 with complaint of sudden onset of dizziness, vertigo, mild headache, nausea. ED vitals were normal. Exam was unremarkable aside for his known obese body habitus, L BKA. WBC 8.4. Hgb 10.8. BMP, LFTs WNL aside for mild hypoalbuminemia. Troponin negative. EKG afib, rate 57, no acute changes. UA with glucosuria and trace ketones. CT head w/ no apparent acute infarct, hemorrhage or mass. No hydrocephalus. Chronic appearing white matter hypodensities and volume loss. CTA head with no flow-limiting stenosis, occlusion or aneurysm. CTA neck with moderate, 60%, narrowing right ICA origin. No other significant arterial narrowing in the neck. Patient was given meclizine and admitted to Medicine with Neurology consult to advise on whether additional neuroimaging ought to be pursued.     Found to have acute CVA, tx'd w ASA + Plavix, plan for 21d DAPT. Also with R ICA stenosis, plan for outpt f/u with vascular surgery.    Treated for COPD w exacerbation, plan for prednisone taper on d/c and completion of doxy course.     Return to [facility] w F2F.  I have spent 34 min on day of d/c coordinating care. See progress note for problem based plan.

## 2023-11-29 NOTE — DISCHARGE NOTE PROVIDER - PROVIDER TOKENS
PROVIDER:[TOKEN:[[id number]],FOLLOWUP:[1 week]],PROVIDER:[TOKEN:[[id number]],FOLLOWUP:[1 week]],PROVIDER:[TOKEN:[[id number]],FOLLOWUP:[2 weeks]],PROVIDER:[TOKEN:[[id number]],FOLLOWUP:[2 weeks]]

## 2023-11-29 NOTE — PROGRESS NOTE ADULT - PROBLEM SELECTOR PROBLEM 3
Moderate pulmonary hypertension
Permanent atrial fibrillation
Moderate pulmonary hypertension
Permanent atrial fibrillation
Permanent atrial fibrillation

## 2023-11-29 NOTE — PROGRESS NOTE ADULT - SUBJECTIVE AND OBJECTIVE BOX
Subjective:  awake and alert  no distress    MEDICATIONS  (STANDING):  aspirin enteric coated 81 milliGRAM(s) Oral daily  atorvastatin 80 milliGRAM(s) Oral at bedtime  budesonide 160 MICROgram(s)/formoterol 4.5 MICROgram(s) Inhaler 2 Puff(s) Inhalation two times a day  busPIRone 10 milliGRAM(s) Oral two times a day  clopidogrel Tablet 75 milliGRAM(s) Oral daily  cyanocobalamin 1000 MICROGram(s) Oral daily  diltiazem    milliGRAM(s) Oral daily  doxazosin 1 milliGRAM(s) Oral at bedtime  doxycycline monohydrate Capsule 100 milliGRAM(s) Oral every 12 hours  enoxaparin Injectable 40 milliGRAM(s) SubCutaneous every 24 hours  folic acid 1 milliGRAM(s) Oral daily  furosemide   Injectable 40 milliGRAM(s) IV Push two times a day  gabapentin 400 milliGRAM(s) Oral three times a day  influenza  Vaccine (HIGH DOSE) 0.7 milliLiter(s) IntraMuscular once  metoprolol tartrate 25 milliGRAM(s) Oral two times a day  multivitamin 1 Tablet(s) Oral daily  pantoprazole    Tablet 40 milliGRAM(s) Oral before breakfast  predniSONE   Tablet 40 milliGRAM(s) Oral daily  psyllium Powder 2 Packet(s) Oral daily  QUEtiapine 100 milliGRAM(s) Oral at bedtime  spironolactone 50 milliGRAM(s) Oral daily  thiamine 100 milliGRAM(s) Oral daily  tiotropium 2.5 MICROgram(s) Inhaler 2 Puff(s) Inhalation daily  zaleplon 5 milliGRAM(s) Oral at bedtime    MEDICATIONS  (PRN):  acetaminophen     Tablet .. 650 milliGRAM(s) Oral every 6 hours PRN Mild Pain (1 - 3)  albuterol    90 MICROgram(s) HFA Inhaler 1 Puff(s) Inhalation every 6 hours PRN Shortness of Breath and/or Wheezing  benzocaine/menthol Lozenge 1 Lozenge Oral every 6 hours PRN Sore Throat  meclizine 25 milliGRAM(s) Oral every 8 hours PRN Dizziness  ondansetron Injectable 4 milliGRAM(s) IV Push every 6 hours PRN Nausea and/or Vomiting      Allergies    Duricef (Rash)  Ceclor (Rash)    Intolerances        REVIEW OF SYSTEMS:    CONSTITUTIONAL:  As per HPI.  HEENT:  Eyes:  No diplopia or blurred vision. ENT:  No earache, sore throat or runny nose.  CARDIOVASCULAR:  No pressure, squeezing, tightness, heaviness or aching about the chest, neck, axilla or epigastrium.  RESPIRATORY:  No cough, shortness of breath, PND or orthopnea.  GASTROINTESTINAL:  No nausea, vomiting or diarrhea.  GENITOURINARY:  No dysuria, frequency or urgency.  MUSCULOSKELETAL:  no joint pain, deformity, tenderness  EXTREMITIES: left bka  SKIN:  No change in skin, hair or nails.  NEUROLOGIC:  No paresthesias, fasciculations, seizures or weakness.  PSYCHIATRIC:  No disorder of thought or mood.  ENDOCRINE:  No heat or cold intolerance, polyuria or polydipsia.  HEMATOLOGICAL:  No easy bruising or bleedings:    Vital Signs Last 24 Hrs  T(C): 36.5 (29 Nov 2023 08:30), Max: 36.8 (28 Nov 2023 20:19)  T(F): 97.7 (29 Nov 2023 08:30), Max: 98.2 (28 Nov 2023 20:19)  HR: 79 (29 Nov 2023 08:30) (71 - 105)  BP: 110/72 (29 Nov 2023 08:30) (101/66 - 124/54)  BP(mean): 84 (29 Nov 2023 06:00) (75 - 84)  RR: 18 (29 Nov 2023 08:30) (18 - 18)  SpO2: 99% (29 Nov 2023 08:30) (91% - 99%)    Parameters below as of 29 Nov 2023 08:30  Patient On (Oxygen Delivery Method): nasal cannula  O2 Flow (L/min): 5      PHYSICAL EXAMINATION:  SKIN: no rashes  HEAD: NC/AT  NECK:  Supple. No lymphadenopathy. Jugular venous pressure not elevated. Carotids equal.   HEART:  S1S2 reg  CHEST:  bibasilar crackles  ABDOMEN:  Soft and nontender.   EXTREMITIES:  left BKA; RLE edema improved  NEURO: AAO x 3, no focal deficts       LABS:                RADIOLOGY & ADDITIONAL TESTS:

## 2023-11-29 NOTE — PROGRESS NOTE ADULT - PROVIDER SPECIALTY LIST ADULT
Hospitalist
Neurology
Neurology
Pulmonology
Pulmonology
Hospitalist
Hospitalist
Neurology
Pulmonology
Hospitalist
Pulmonology
Hospitalist
Cardiology

## 2023-11-29 NOTE — DISCHARGE NOTE PROVIDER - NSDCPNSUBOBJ_GEN_ALL_CORE
VITALS:  T(F): 97.7 (11-29-23 @ 08:30), Max: 98.2 (11-28-23 @ 20:19)  HR: 78 (11-29-23 @ 10:38) (71 - 91)  BP: 110/72 (11-29-23 @ 08:30) (110/72 - 124/54)  RR: 18 (11-29-23 @ 08:30) (18 - 18)  SpO2: 97% (11-29-23 @ 10:38) (91% - 99%)  Wt(kg): --    PHYSICAL EXAM:  Gen: NAD  HEENT:  pupils equal and reactive, EOMI, no oropharyngeal lesions, erythema, exudates, oral thrush  NECK:   supple, no carotid bruits, no palpable lymph nodes, no thyromegaly  CV:  +S1, +S2, regular, no murmurs or rubs  RESP:   good air mvmnt, scattered wheeze  BREAST:  not examined  GI:  abdomen soft, non-tender, non-distended, normal BS, no bruits, no abdominal masses, no palpable masses  RECTAL:  not examined  :  not examined  MSK:   normal muscle tone, no atrophy, no rigidity, no contractions  EXT:  no clubbing, no cyanosis, no edema, no calf pain, swelling or erythema, + L BKA  VASCULAR:  pulses equal and symmetric in the upper and lower extremities  NEURO:  AAOX3, no focal neurological deficits, follows all commands, able to move extremities spontaneously  SKIN:  no ulcers, lesions or rashes    Imaging:  CT chest WO 11/24: Tracheal bronchial secretions. Peripheral reticulation with associated traction bronchiectasis and patchy opacity   that is increased from the prior study. No honeycombing. Mildly enlarged mediastinal lymph nodes. The visualized portion of the thyroid gland is heterogeneous. There is no pleural effusion. There is no pneumothorax. There is cardiomegaly.  There are atherosclerotic   calcifications of the aorta and coronary arteries.  There is no pericardial effusion.  Left atrial appendage closure device. Images of the upper abdomen demonstrate re-demonstrated splenic hypodensity. The bones are unremarkable.  The soft tissues are unremarkable.    CXR 11/19: Frontal expiratory view of the chest shows the heart to be similarly enlarged in size. The lungs show clear right lung with small left lower lobe infiltrate and there is no evidence of pneumothorax nor definite pleural effusion.    MR brain WO 11/18: Ventricular and sulcal prominence is consistent with age-appropriate involutional change. Small vessel white matter ischemic changes are noted. There is a tiny acute infarct in a high right frontal cortex along the precentral gyrus, new since 11/7/2017. No acute hemorrhage is identified. The sellar and parasellar structures are unremarkable. There is mild mucosal thickening of the ethmoid sinuses.    US duplex B/L carotid arteries 11/18: No significant hemodynamic stenosis of either carotid artery.    CTA neck W/ IV cont 11/18: Moderate, 60%, narrowing right ICA origin. No other significant arterial narrowing in the neck.    CTA head w/ IV cont 11/18: No flow-limiting stenosis, occlusion or aneurysm.    CT head 11/18: No apparent acute infarct, hemorrhage or mass. No hydrocephalus. Chronic appearing white matter hypodensities and volume loss.    Cardiac Testing:  MYA 11/20: Normal left ventricular size and systolic function. Estimated LVEF is 60-65%. Left atrial occlusion device present with small jose-device leak. No evidence of jose-device thrombus. Moderate mitral and tricuspid regurgitation.    EKG 11/17: Afib, rate 57, no acute changes      Dizziness, vertigo    His neurological examination was non-focal. CT imaging in the ED as described above. Neurology input was appreciated. MRI brain was obtained for completeness and MRI did showed a tiny acute infarct in the right frontal precentral gyrus, though this may not necessarily be the cause of dizziness. See below.  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 Appreciate Neurology input. Has known afib. Not on AC as patient is s/p Watchman. MYA done 11/20. Estimated LVEF is 60-65%. Left atrial occlusion device present with small jose-device leak. No evidence of jose-device thrombus. Regarding CT-reported R ICA stenosis, US duplex B/L carotid arteries was done. Found no significant hemodynamic stenosis of either carotid artery. Also, worth noting that his dizziness is unlikely from R ICA stenosis.   - Continue on aspirin, continue Plavix (DAPT x 21d, 10 more days on d/c)  - Continue high intensity statin  - Continue PT/OT    Non occlusive R ICA stenosis  Appreciate Vascular Surgery input.   - Continue antiplatelet, statin  - Outpatient follow up with Dr Perez.     Chronic respiratory failure with hypoxia and hypercapnia.   Likely multi factorial due to OHS, VIJAY nonadherent to CPAP, mod pHTN, CHF and now increasing ground glass lung opacities on CT imaging of the chest, see below.   - Continue to treat underlying issues  - Continue oxygen supplementation  - Continue to encourage NIPPV use but patient is deferring    Worsening pulmonary opacities  CT chest with increased ground glass opacity compared to prior studies and may be infectious/inflammatory (including active   interstitial lung disease) versus fluid overload. Appreciate Pulmonary Medicine input. Patient treated with empiric IV steroids, bronchodilators. Feeling somewhat improved, likely getting closer to baseline. Sent off inflammatory marker and auto-immune labs. ESR 78, RF <10, total hemolytic complement 74, C3 140, C4 20, scleroderma <0.2, SSA/SSB <0.2.  - Continue steroids, now PO prednisone taper  - Repeat CT scan in 3 months and if persistent, then will discuss about biopsy.   - Repeat sleep study as outpatient    COPD, unable to rule out exacerbation  Appreciate input from Pulmonary Medicine. Tapered from IV to PO steroids. Cough is quite productive, however. Sputum thick and grayish-green.   - doxy x 5d total  - Continue steroids and bronchodilators  - Follow up with Pulmonary as outpatient    Chronic diastolic CHF, unable to rule out acute component given CT chest findings   Hx of recurrent CHF exacerbations for HFpEF. MYA from this admission reviewed. Normal left ventricular size and systolic function. Estimated LVEF is 60-65%. Left atrial occlusion device present with small jose-device leak. No evidence of jose-device thrombus. Moderate mitral and tricuspid regurgitation. Mild fluid overload on exam. BNP elevated on admission 4176, now downtrending 3031.   - s/p iv lasix, transition to prior home oral regimen on d/c  - Continue metoprolol, spironolactone, doxazosin, diltiazem (rate control)  - Daily standing weights  - Is and Os    Permanent atrial fibrillation  Rate controlled; on metoprolol and diltiazem. CHADS2-VASC>2, AC contraindicated due to alcohol use disorder and frequent falls. S/p Watchman implanted Apr '[facility] at [facility]. MYA showed LA occlusion device present with small jose-device leak. No evidence of jose-device thrombus  - Cont. metoprolol, diltiazem for rate control.  - Not on AC (has RUDOLPH occlusion device)        DVT px: LMWH daily  Code status: DNR, DNI, trial NIV

## 2023-11-29 NOTE — DISCHARGE NOTE PROVIDER - NSDCMRMEDTOKEN_GEN_ALL_CORE_FT
albuterol 90 mcg/inh inhalation aerosol: 2 puff(s) inhaled every 4 hours, As Needed for wheezing  Aspirin Enteric Coated 81 mg oral delayed release tablet: 1 tab(s) orally once a day  atorvastatin 80 mg oral tablet: 1 tab(s) orally once a day (at bedtime)  budesonide-formoterol 160 mcg-4.5 mcg/inh inhalation aerosol: 2 puff(s) inhaled 2 times a day  busPIRone 10 mg oral tablet: 1 tab(s) orally 2 times a day  cholecalciferol 25 mcg (1000 intl units) oral tablet: 1 tab(s) orally once a day  clopidogrel 75 mg oral tablet: 1 tab(s) orally once a day  Colace 100 mg oral capsule: 2 cap(s) orally once a day  cyanocobalamin 1000 mcg oral tablet: 1 tab(s) orally once a day  dilTIAZem 180 mg/24 hours oral capsule, extended release: 1 cap(s) orally once a day  doxazosin 1 mg oral tablet: 1 tab(s) orally once a day (at bedtime)  doxycycline monohydrate 50 mg oral capsule: 2 cap(s) orally every 12 hours  Farxiga 10 mg oral tablet: 1 tab(s) orally once a day  folic acid 1 mg oral tablet: 1 tab(s) orally once a day  furosemide 20 mg oral tablet: 2 tab(s) orally once a day  gabapentin 400 mg oral capsule: 1 cap(s) orally 3 times a day  meclizine 25 mg oral tablet: 1 tab(s) orally every 8 hours as needed for Dizziness  Metoprolol Tartrate 25 mg oral tablet: 1 tab(s) orally 2 times a day  Multiple Vitamins oral tablet: 1 tab(s) orally once a day  omeprazole 40 mg oral delayed release capsule: 1 cap(s) orally once a day  predniSONE 10 mg oral tablet: 1 tab(s) orally once a day Take 4 tabs (40mg) once a day for 3 days, then take 3 tabs (30mg) once a day for 3 days, then take 2 tabs (20mg) once a day for 3 days, then take 1 tab (10mg) once a day for 3 days, then stop.  psyllium 500 mg oral capsule: 2 cap(s) orally once a day  QUEtiapine 50 mg oral tablet: 2 tab(s) orally once a day (at bedtime)  spironolactone 25 mg oral tablet: 2 tab(s) orally once a day  thiamine 100 mg oral tablet: 1 tab(s) orally once a day  tiotropium 2.5 mcg/inh inhalation aerosol: 2 puff(s) inhaled once a day

## 2023-11-29 NOTE — PROGRESS NOTE ADULT - PROBLEM SELECTOR PROBLEM 1
CVA (cerebrovascular accident)
Chronic respiratory failure with hypoxia and hypercapnia
Chronic respiratory failure with hypoxia and hypercapnia

## 2023-11-29 NOTE — PROGRESS NOTE ADULT - PROBLEM SELECTOR PROBLEM 2
Acute diastolic congestive heart failure
Acute diastolic congestive heart failure
Obesity hypoventilation syndrome
Obesity hypoventilation syndrome
Acute diastolic congestive heart failure

## 2023-11-29 NOTE — DISCHARGE NOTE PROVIDER - NSDCHHHOMEBOUND_GEN_ALL_CORE
Fall risk Pain resolved. Serial abdo examination unremarkable. Mother can follow up tomorrow with the pediatrician. Discussed strict return instructions re appy precautions and general abdo pain dc instructions. Pt is well appearing walking with steady gait, stable for discharge and follow up without fail with medical doctor. I had a detailed discussion with the patient and/or guardian regarding the historical points, exam findings, and any diagnostic results supporting the discharge diagnosis. Pt educated on care and need for follow up. Strict return instructions and red flag signs and symptoms discussed with patient. Questions answered. Pt shows understanding of discharge information and agrees to follow.

## 2023-11-29 NOTE — DISCHARGE NOTE PROVIDER - CARE PROVIDERS DIRECT ADDRESSES
,[email or web address]

## 2023-11-29 NOTE — PROGRESS NOTE ADULT - ASSESSMENT
- cont O2  - s/p MYA for afib  - has normal EF w moderate pulmonary htn  - on symbicort symbicort/spiriva  - cont prednisone and wean  - on lovenox for dvt

## 2023-11-29 NOTE — PROGRESS NOTE ADULT - REASON FOR ADMISSION
Persistent Vertigo
Dizziness, vertigo, mild headache
Persistent Vertigo
Vertigo
Persistent Vertigo

## 2023-11-30 ENCOUNTER — INPATIENT (INPATIENT)
Facility: HOSPITAL | Age: 68
LOS: 5 days | Discharge: NURSING, REHAB OR LONG-TERM CARE | DRG: 871 | End: 2023-12-06
Attending: STUDENT IN AN ORGANIZED HEALTH CARE EDUCATION/TRAINING PROGRAM | Admitting: SURGERY
Payer: MEDICARE

## 2023-11-30 VITALS
SYSTOLIC BLOOD PRESSURE: 113 MMHG | RESPIRATION RATE: 31 BRPM | OXYGEN SATURATION: 98 % | DIASTOLIC BLOOD PRESSURE: 91 MMHG | HEART RATE: 142 BPM | HEIGHT: 65 IN

## 2023-11-30 DIAGNOSIS — Z93.1 GASTROSTOMY STATUS: Chronic | ICD-10-CM

## 2023-11-30 DIAGNOSIS — Z89.512 ACQUIRED ABSENCE OF LEFT LEG BELOW KNEE: Chronic | ICD-10-CM

## 2023-11-30 DIAGNOSIS — Z95.818 PRESENCE OF OTHER CARDIAC IMPLANTS AND GRAFTS: Chronic | ICD-10-CM

## 2023-11-30 DIAGNOSIS — J96.21 ACUTE AND CHRONIC RESPIRATORY FAILURE WITH HYPOXIA: ICD-10-CM

## 2023-11-30 DIAGNOSIS — S88.119A COMPLETE TRAUMATIC AMPUTATION AT LEVEL BETWEEN KNEE AND ANKLE, UNSPECIFIED LOWER LEG, INITIAL ENCOUNTER: Chronic | ICD-10-CM

## 2023-11-30 DIAGNOSIS — Z98.890 OTHER SPECIFIED POSTPROCEDURAL STATES: Chronic | ICD-10-CM

## 2023-11-30 DIAGNOSIS — J96.01 ACUTE RESPIRATORY FAILURE WITH HYPOXIA: ICD-10-CM

## 2023-11-30 DIAGNOSIS — I48.91 UNSPECIFIED ATRIAL FIBRILLATION: ICD-10-CM

## 2023-11-30 DIAGNOSIS — I50.33 ACUTE ON CHRONIC DIASTOLIC (CONGESTIVE) HEART FAILURE: ICD-10-CM

## 2023-11-30 LAB
ADD ON TEST-SPECIMEN IN LAB: SIGNIFICANT CHANGE UP
ADD ON TEST-SPECIMEN IN LAB: SIGNIFICANT CHANGE UP
ALBUMIN SERPL ELPH-MCNC: 2.8 G/DL — LOW (ref 3.3–5)
ALBUMIN SERPL ELPH-MCNC: 2.8 G/DL — LOW (ref 3.3–5)
ALP SERPL-CCNC: 85 U/L — SIGNIFICANT CHANGE UP (ref 40–120)
ALP SERPL-CCNC: 85 U/L — SIGNIFICANT CHANGE UP (ref 40–120)
ALT FLD-CCNC: 27 U/L — SIGNIFICANT CHANGE UP (ref 12–78)
ALT FLD-CCNC: 27 U/L — SIGNIFICANT CHANGE UP (ref 12–78)
ANION GAP SERPL CALC-SCNC: 7 MMOL/L — SIGNIFICANT CHANGE UP (ref 5–17)
ANION GAP SERPL CALC-SCNC: 7 MMOL/L — SIGNIFICANT CHANGE UP (ref 5–17)
APTT BLD: 24.6 SEC — SIGNIFICANT CHANGE UP (ref 24.5–35.6)
APTT BLD: 24.6 SEC — SIGNIFICANT CHANGE UP (ref 24.5–35.6)
AST SERPL-CCNC: 14 U/L — LOW (ref 15–37)
AST SERPL-CCNC: 14 U/L — LOW (ref 15–37)
BASOPHILS # BLD AUTO: 0.02 K/UL — SIGNIFICANT CHANGE UP (ref 0–0.2)
BASOPHILS # BLD AUTO: 0.02 K/UL — SIGNIFICANT CHANGE UP (ref 0–0.2)
BASOPHILS NFR BLD AUTO: 0.1 % — SIGNIFICANT CHANGE UP (ref 0–2)
BASOPHILS NFR BLD AUTO: 0.1 % — SIGNIFICANT CHANGE UP (ref 0–2)
BILIRUB SERPL-MCNC: 1 MG/DL — SIGNIFICANT CHANGE UP (ref 0.2–1.2)
BILIRUB SERPL-MCNC: 1 MG/DL — SIGNIFICANT CHANGE UP (ref 0.2–1.2)
BUN SERPL-MCNC: 24 MG/DL — HIGH (ref 7–23)
BUN SERPL-MCNC: 24 MG/DL — HIGH (ref 7–23)
CALCIUM SERPL-MCNC: 8.5 MG/DL — SIGNIFICANT CHANGE UP (ref 8.5–10.1)
CALCIUM SERPL-MCNC: 8.5 MG/DL — SIGNIFICANT CHANGE UP (ref 8.5–10.1)
CHLORIDE SERPL-SCNC: 104 MMOL/L — SIGNIFICANT CHANGE UP (ref 96–108)
CHLORIDE SERPL-SCNC: 104 MMOL/L — SIGNIFICANT CHANGE UP (ref 96–108)
CO2 SERPL-SCNC: 28 MMOL/L — SIGNIFICANT CHANGE UP (ref 22–31)
CO2 SERPL-SCNC: 28 MMOL/L — SIGNIFICANT CHANGE UP (ref 22–31)
CREAT SERPL-MCNC: 1.06 MG/DL — SIGNIFICANT CHANGE UP (ref 0.5–1.3)
CREAT SERPL-MCNC: 1.06 MG/DL — SIGNIFICANT CHANGE UP (ref 0.5–1.3)
EGFR: 77 ML/MIN/1.73M2 — SIGNIFICANT CHANGE UP
EGFR: 77 ML/MIN/1.73M2 — SIGNIFICANT CHANGE UP
EOSINOPHIL # BLD AUTO: 0.02 K/UL — SIGNIFICANT CHANGE UP (ref 0–0.5)
EOSINOPHIL # BLD AUTO: 0.02 K/UL — SIGNIFICANT CHANGE UP (ref 0–0.5)
EOSINOPHIL NFR BLD AUTO: 0.1 % — SIGNIFICANT CHANGE UP (ref 0–6)
EOSINOPHIL NFR BLD AUTO: 0.1 % — SIGNIFICANT CHANGE UP (ref 0–6)
GLUCOSE BLDC GLUCOMTR-MCNC: 147 MG/DL — HIGH (ref 70–99)
GLUCOSE BLDC GLUCOMTR-MCNC: 147 MG/DL — HIGH (ref 70–99)
GLUCOSE BLDC GLUCOMTR-MCNC: 214 MG/DL — HIGH (ref 70–99)
GLUCOSE BLDC GLUCOMTR-MCNC: 214 MG/DL — HIGH (ref 70–99)
GLUCOSE SERPL-MCNC: 94 MG/DL — SIGNIFICANT CHANGE UP (ref 70–99)
GLUCOSE SERPL-MCNC: 94 MG/DL — SIGNIFICANT CHANGE UP (ref 70–99)
HCT VFR BLD CALC: 40.2 % — SIGNIFICANT CHANGE UP (ref 39–50)
HCT VFR BLD CALC: 40.2 % — SIGNIFICANT CHANGE UP (ref 39–50)
HGB BLD-MCNC: 12.8 G/DL — LOW (ref 13–17)
HGB BLD-MCNC: 12.8 G/DL — LOW (ref 13–17)
IMM GRANULOCYTES NFR BLD AUTO: 0.5 % — SIGNIFICANT CHANGE UP (ref 0–0.9)
IMM GRANULOCYTES NFR BLD AUTO: 0.5 % — SIGNIFICANT CHANGE UP (ref 0–0.9)
INR BLD: 1.09 RATIO — SIGNIFICANT CHANGE UP (ref 0.85–1.18)
INR BLD: 1.09 RATIO — SIGNIFICANT CHANGE UP (ref 0.85–1.18)
INR BLD: 1.16 RATIO — SIGNIFICANT CHANGE UP (ref 0.85–1.18)
INR BLD: 1.16 RATIO — SIGNIFICANT CHANGE UP (ref 0.85–1.18)
LACTATE SERPL-SCNC: 2.6 MMOL/L — HIGH (ref 0.7–2)
LACTATE SERPL-SCNC: 2.6 MMOL/L — HIGH (ref 0.7–2)
LACTATE SERPL-SCNC: 3 MMOL/L — HIGH (ref 0.7–2)
LACTATE SERPL-SCNC: 3 MMOL/L — HIGH (ref 0.7–2)
LYMPHOCYTES # BLD AUTO: 0.5 K/UL — LOW (ref 1–3.3)
LYMPHOCYTES # BLD AUTO: 0.5 K/UL — LOW (ref 1–3.3)
LYMPHOCYTES # BLD AUTO: 3.4 % — LOW (ref 13–44)
LYMPHOCYTES # BLD AUTO: 3.4 % — LOW (ref 13–44)
MCHC RBC-ENTMCNC: 28.1 PG — SIGNIFICANT CHANGE UP (ref 27–34)
MCHC RBC-ENTMCNC: 28.1 PG — SIGNIFICANT CHANGE UP (ref 27–34)
MCHC RBC-ENTMCNC: 31.8 GM/DL — LOW (ref 32–36)
MCHC RBC-ENTMCNC: 31.8 GM/DL — LOW (ref 32–36)
MCV RBC AUTO: 88.2 FL — SIGNIFICANT CHANGE UP (ref 80–100)
MCV RBC AUTO: 88.2 FL — SIGNIFICANT CHANGE UP (ref 80–100)
MONOCYTES # BLD AUTO: 0.66 K/UL — SIGNIFICANT CHANGE UP (ref 0–0.9)
MONOCYTES # BLD AUTO: 0.66 K/UL — SIGNIFICANT CHANGE UP (ref 0–0.9)
MONOCYTES NFR BLD AUTO: 4.5 % — SIGNIFICANT CHANGE UP (ref 2–14)
MONOCYTES NFR BLD AUTO: 4.5 % — SIGNIFICANT CHANGE UP (ref 2–14)
NEUTROPHILS # BLD AUTO: 13.53 K/UL — HIGH (ref 1.8–7.4)
NEUTROPHILS # BLD AUTO: 13.53 K/UL — HIGH (ref 1.8–7.4)
NEUTROPHILS NFR BLD AUTO: 91.4 % — HIGH (ref 43–77)
NEUTROPHILS NFR BLD AUTO: 91.4 % — HIGH (ref 43–77)
NT-PROBNP SERPL-SCNC: 1330 PG/ML — HIGH (ref 0–125)
NT-PROBNP SERPL-SCNC: 1330 PG/ML — HIGH (ref 0–125)
PLATELET # BLD AUTO: 200 K/UL — SIGNIFICANT CHANGE UP (ref 150–400)
PLATELET # BLD AUTO: 200 K/UL — SIGNIFICANT CHANGE UP (ref 150–400)
POTASSIUM SERPL-MCNC: 3.7 MMOL/L — SIGNIFICANT CHANGE UP (ref 3.5–5.3)
POTASSIUM SERPL-MCNC: 3.7 MMOL/L — SIGNIFICANT CHANGE UP (ref 3.5–5.3)
POTASSIUM SERPL-SCNC: 3.7 MMOL/L — SIGNIFICANT CHANGE UP (ref 3.5–5.3)
POTASSIUM SERPL-SCNC: 3.7 MMOL/L — SIGNIFICANT CHANGE UP (ref 3.5–5.3)
PROCALCITONIN SERPL-MCNC: 10.1 NG/ML — HIGH (ref 0.02–0.1)
PROCALCITONIN SERPL-MCNC: 10.1 NG/ML — HIGH (ref 0.02–0.1)
PROT SERPL-MCNC: 6.8 GM/DL — SIGNIFICANT CHANGE UP (ref 6–8.3)
PROT SERPL-MCNC: 6.8 GM/DL — SIGNIFICANT CHANGE UP (ref 6–8.3)
PROTHROM AB SERPL-ACNC: 12.3 SEC — SIGNIFICANT CHANGE UP (ref 9.5–13)
PROTHROM AB SERPL-ACNC: 12.3 SEC — SIGNIFICANT CHANGE UP (ref 9.5–13)
PROTHROM AB SERPL-ACNC: 13 SEC — SIGNIFICANT CHANGE UP (ref 9.5–13)
PROTHROM AB SERPL-ACNC: 13 SEC — SIGNIFICANT CHANGE UP (ref 9.5–13)
RBC # BLD: 4.56 M/UL — SIGNIFICANT CHANGE UP (ref 4.2–5.8)
RBC # BLD: 4.56 M/UL — SIGNIFICANT CHANGE UP (ref 4.2–5.8)
RBC # FLD: 16.1 % — HIGH (ref 10.3–14.5)
RBC # FLD: 16.1 % — HIGH (ref 10.3–14.5)
SODIUM SERPL-SCNC: 139 MMOL/L — SIGNIFICANT CHANGE UP (ref 135–145)
SODIUM SERPL-SCNC: 139 MMOL/L — SIGNIFICANT CHANGE UP (ref 135–145)
TROPONIN I, HIGH SENSITIVITY RESULT: 10.81 NG/L — SIGNIFICANT CHANGE UP
TROPONIN I, HIGH SENSITIVITY RESULT: 10.81 NG/L — SIGNIFICANT CHANGE UP
WBC # BLD: 14.81 K/UL — HIGH (ref 3.8–10.5)
WBC # BLD: 14.81 K/UL — HIGH (ref 3.8–10.5)
WBC # FLD AUTO: 14.81 K/UL — HIGH (ref 3.8–10.5)
WBC # FLD AUTO: 14.81 K/UL — HIGH (ref 3.8–10.5)

## 2023-11-30 PROCEDURE — 83735 ASSAY OF MAGNESIUM: CPT

## 2023-11-30 PROCEDURE — 36415 COLL VENOUS BLD VENIPUNCTURE: CPT

## 2023-11-30 PROCEDURE — 71250 CT THORAX DX C-: CPT | Mod: 26

## 2023-11-30 PROCEDURE — 83605 ASSAY OF LACTIC ACID: CPT

## 2023-11-30 PROCEDURE — 83540 ASSAY OF IRON: CPT

## 2023-11-30 PROCEDURE — 84100 ASSAY OF PHOSPHORUS: CPT

## 2023-11-30 PROCEDURE — 82962 GLUCOSE BLOOD TEST: CPT

## 2023-11-30 PROCEDURE — 97163 PT EVAL HIGH COMPLEX 45 MIN: CPT | Mod: GP

## 2023-11-30 PROCEDURE — 93010 ELECTROCARDIOGRAM REPORT: CPT

## 2023-11-30 PROCEDURE — 99291 CRITICAL CARE FIRST HOUR: CPT

## 2023-11-30 PROCEDURE — 84145 PROCALCITONIN (PCT): CPT

## 2023-11-30 PROCEDURE — 99223 1ST HOSP IP/OBS HIGH 75: CPT

## 2023-11-30 PROCEDURE — 85610 PROTHROMBIN TIME: CPT

## 2023-11-30 PROCEDURE — 87449 NOS EACH ORGANISM AG IA: CPT

## 2023-11-30 PROCEDURE — 85027 COMPLETE CBC AUTOMATED: CPT

## 2023-11-30 PROCEDURE — 94760 N-INVAS EAR/PLS OXIMETRY 1: CPT

## 2023-11-30 PROCEDURE — 80048 BASIC METABOLIC PNL TOTAL CA: CPT

## 2023-11-30 PROCEDURE — 85730 THROMBOPLASTIN TIME PARTIAL: CPT

## 2023-11-30 PROCEDURE — 82728 ASSAY OF FERRITIN: CPT

## 2023-11-30 PROCEDURE — 94640 AIRWAY INHALATION TREATMENT: CPT

## 2023-11-30 PROCEDURE — 71250 CT THORAX DX C-: CPT

## 2023-11-30 PROCEDURE — 87899 AGENT NOS ASSAY W/OPTIC: CPT

## 2023-11-30 PROCEDURE — 80053 COMPREHEN METABOLIC PANEL: CPT

## 2023-11-30 PROCEDURE — 83550 IRON BINDING TEST: CPT

## 2023-11-30 PROCEDURE — 71045 X-RAY EXAM CHEST 1 VIEW: CPT | Mod: 26

## 2023-11-30 PROCEDURE — 94660 CPAP INITIATION&MGMT: CPT

## 2023-11-30 PROCEDURE — 97530 THERAPEUTIC ACTIVITIES: CPT | Mod: GP

## 2023-11-30 RX ORDER — QUETIAPINE FUMARATE 200 MG/1
100 TABLET, FILM COATED ORAL AT BEDTIME
Refills: 0 | Status: DISCONTINUED | OUTPATIENT
Start: 2023-11-30 | End: 2023-12-06

## 2023-11-30 RX ORDER — INFLUENZA VIRUS VACCINE 15; 15; 15; 15 UG/.5ML; UG/.5ML; UG/.5ML; UG/.5ML
0.7 SUSPENSION INTRAMUSCULAR ONCE
Refills: 0 | Status: DISCONTINUED | OUTPATIENT
Start: 2023-11-30 | End: 2023-12-06

## 2023-11-30 RX ORDER — GABAPENTIN 400 MG/1
400 CAPSULE ORAL THREE TIMES A DAY
Refills: 0 | Status: DISCONTINUED | OUTPATIENT
Start: 2023-11-30 | End: 2023-12-06

## 2023-11-30 RX ORDER — DEXTROSE 50 % IN WATER 50 %
25 SYRINGE (ML) INTRAVENOUS ONCE
Refills: 0 | Status: DISCONTINUED | OUTPATIENT
Start: 2023-11-30 | End: 2023-12-06

## 2023-11-30 RX ORDER — BUDESONIDE AND FORMOTEROL FUMARATE DIHYDRATE 160; 4.5 UG/1; UG/1
2 AEROSOL RESPIRATORY (INHALATION)
Refills: 0 | Status: DISCONTINUED | OUTPATIENT
Start: 2023-11-30 | End: 2023-12-06

## 2023-11-30 RX ORDER — TIOTROPIUM BROMIDE 18 UG/1
2 CAPSULE ORAL; RESPIRATORY (INHALATION) DAILY
Refills: 0 | Status: DISCONTINUED | OUTPATIENT
Start: 2023-11-30 | End: 2023-12-06

## 2023-11-30 RX ORDER — CHOLECALCIFEROL (VITAMIN D3) 125 MCG
1000 CAPSULE ORAL DAILY
Refills: 0 | Status: DISCONTINUED | OUTPATIENT
Start: 2023-11-30 | End: 2023-12-06

## 2023-11-30 RX ORDER — FOLIC ACID 0.8 MG
1 TABLET ORAL DAILY
Refills: 0 | Status: DISCONTINUED | OUTPATIENT
Start: 2023-11-30 | End: 2023-12-06

## 2023-11-30 RX ORDER — PREGABALIN 225 MG/1
1000 CAPSULE ORAL DAILY
Refills: 0 | Status: DISCONTINUED | OUTPATIENT
Start: 2023-11-30 | End: 2023-12-06

## 2023-11-30 RX ORDER — ASPIRIN/CALCIUM CARB/MAGNESIUM 324 MG
81 TABLET ORAL DAILY
Refills: 0 | Status: DISCONTINUED | OUTPATIENT
Start: 2023-11-30 | End: 2023-12-06

## 2023-11-30 RX ORDER — ENOXAPARIN SODIUM 100 MG/ML
40 INJECTION SUBCUTANEOUS EVERY 24 HOURS
Refills: 0 | Status: DISCONTINUED | OUTPATIENT
Start: 2023-11-30 | End: 2023-11-30

## 2023-11-30 RX ORDER — ALBUTEROL 90 UG/1
2 AEROSOL, METERED ORAL EVERY 4 HOURS
Refills: 0 | Status: DISCONTINUED | OUTPATIENT
Start: 2023-11-30 | End: 2023-12-06

## 2023-11-30 RX ORDER — IPRATROPIUM/ALBUTEROL SULFATE 18-103MCG
3 AEROSOL WITH ADAPTER (GRAM) INHALATION EVERY 6 HOURS
Refills: 0 | Status: DISCONTINUED | OUTPATIENT
Start: 2023-11-30 | End: 2023-12-01

## 2023-11-30 RX ORDER — DILTIAZEM HCL 120 MG
5 CAPSULE, EXT RELEASE 24 HR ORAL
Qty: 125 | Refills: 0 | Status: DISCONTINUED | OUTPATIENT
Start: 2023-11-30 | End: 2023-12-01

## 2023-11-30 RX ORDER — METOPROLOL TARTRATE 50 MG
25 TABLET ORAL
Refills: 0 | Status: DISCONTINUED | OUTPATIENT
Start: 2023-11-30 | End: 2023-12-06

## 2023-11-30 RX ORDER — ENOXAPARIN SODIUM 100 MG/ML
40 INJECTION SUBCUTANEOUS EVERY 12 HOURS
Refills: 0 | Status: DISCONTINUED | OUTPATIENT
Start: 2023-11-30 | End: 2023-12-06

## 2023-11-30 RX ORDER — SODIUM CHLORIDE 9 MG/ML
500 INJECTION INTRAMUSCULAR; INTRAVENOUS; SUBCUTANEOUS ONCE
Refills: 0 | Status: COMPLETED | OUTPATIENT
Start: 2023-11-30 | End: 2023-11-30

## 2023-11-30 RX ORDER — CEFEPIME 1 G/1
2000 INJECTION, POWDER, FOR SOLUTION INTRAMUSCULAR; INTRAVENOUS EVERY 8 HOURS
Refills: 0 | Status: DISCONTINUED | OUTPATIENT
Start: 2023-11-30 | End: 2023-11-30

## 2023-11-30 RX ORDER — DILTIAZEM HCL 120 MG
15 CAPSULE, EXT RELEASE 24 HR ORAL ONCE
Refills: 0 | Status: COMPLETED | OUTPATIENT
Start: 2023-11-30 | End: 2023-11-30

## 2023-11-30 RX ORDER — IPRATROPIUM/ALBUTEROL SULFATE 18-103MCG
3 AEROSOL WITH ADAPTER (GRAM) INHALATION ONCE
Refills: 0 | Status: COMPLETED | OUTPATIENT
Start: 2023-11-30 | End: 2023-11-30

## 2023-11-30 RX ORDER — FUROSEMIDE 40 MG
40 TABLET ORAL DAILY
Refills: 0 | Status: DISCONTINUED | OUTPATIENT
Start: 2023-11-30 | End: 2023-12-06

## 2023-11-30 RX ORDER — THIAMINE MONONITRATE (VIT B1) 100 MG
100 TABLET ORAL DAILY
Refills: 0 | Status: DISCONTINUED | OUTPATIENT
Start: 2023-11-30 | End: 2023-12-06

## 2023-11-30 RX ORDER — DILTIAZEM HCL 120 MG
180 CAPSULE, EXT RELEASE 24 HR ORAL DAILY
Refills: 0 | Status: DISCONTINUED | OUTPATIENT
Start: 2023-11-30 | End: 2023-12-01

## 2023-11-30 RX ORDER — DEXTROSE 50 % IN WATER 50 %
15 SYRINGE (ML) INTRAVENOUS ONCE
Refills: 0 | Status: DISCONTINUED | OUTPATIENT
Start: 2023-11-30 | End: 2023-12-06

## 2023-11-30 RX ORDER — DOXAZOSIN MESYLATE 4 MG
1 TABLET ORAL AT BEDTIME
Refills: 0 | Status: DISCONTINUED | OUTPATIENT
Start: 2023-11-30 | End: 2023-12-06

## 2023-11-30 RX ORDER — GLUCAGON INJECTION, SOLUTION 0.5 MG/.1ML
1 INJECTION, SOLUTION SUBCUTANEOUS ONCE
Refills: 0 | Status: DISCONTINUED | OUTPATIENT
Start: 2023-11-30 | End: 2023-12-06

## 2023-11-30 RX ORDER — PANTOPRAZOLE SODIUM 20 MG/1
40 TABLET, DELAYED RELEASE ORAL
Refills: 0 | Status: DISCONTINUED | OUTPATIENT
Start: 2023-11-30 | End: 2023-12-06

## 2023-11-30 RX ORDER — INSULIN LISPRO 100/ML
VIAL (ML) SUBCUTANEOUS
Refills: 0 | Status: DISCONTINUED | OUTPATIENT
Start: 2023-11-30 | End: 2023-12-06

## 2023-11-30 RX ORDER — VANCOMYCIN HCL 1 G
2000 VIAL (EA) INTRAVENOUS ONCE
Refills: 0 | Status: COMPLETED | OUTPATIENT
Start: 2023-11-30 | End: 2023-11-30

## 2023-11-30 RX ORDER — PSYLLIUM SEED (WITH DEXTROSE)
2 POWDER (GRAM) ORAL DAILY
Refills: 0 | Status: DISCONTINUED | OUTPATIENT
Start: 2023-11-30 | End: 2023-12-06

## 2023-11-30 RX ORDER — CEFEPIME 1 G/1
1000 INJECTION, POWDER, FOR SOLUTION INTRAMUSCULAR; INTRAVENOUS ONCE
Refills: 0 | Status: DISCONTINUED | OUTPATIENT
Start: 2023-11-30 | End: 2023-11-30

## 2023-11-30 RX ORDER — CEFEPIME 1 G/1
1000 INJECTION, POWDER, FOR SOLUTION INTRAMUSCULAR; INTRAVENOUS ONCE
Refills: 0 | Status: COMPLETED | OUTPATIENT
Start: 2023-11-30 | End: 2023-11-30

## 2023-11-30 RX ORDER — DEXTROSE 50 % IN WATER 50 %
12.5 SYRINGE (ML) INTRAVENOUS ONCE
Refills: 0 | Status: DISCONTINUED | OUTPATIENT
Start: 2023-11-30 | End: 2023-12-06

## 2023-11-30 RX ORDER — ATORVASTATIN CALCIUM 80 MG/1
80 TABLET, FILM COATED ORAL AT BEDTIME
Refills: 0 | Status: DISCONTINUED | OUTPATIENT
Start: 2023-11-30 | End: 2023-12-06

## 2023-11-30 RX ORDER — ACETAMINOPHEN 500 MG
650 TABLET ORAL ONCE
Refills: 0 | Status: COMPLETED | OUTPATIENT
Start: 2023-11-30 | End: 2023-11-30

## 2023-11-30 RX ORDER — MECLIZINE HCL 12.5 MG
25 TABLET ORAL EVERY 8 HOURS
Refills: 0 | Status: DISCONTINUED | OUTPATIENT
Start: 2023-11-30 | End: 2023-12-06

## 2023-11-30 RX ORDER — CLOPIDOGREL BISULFATE 75 MG/1
75 TABLET, FILM COATED ORAL DAILY
Refills: 0 | Status: DISCONTINUED | OUTPATIENT
Start: 2023-11-30 | End: 2023-12-06

## 2023-11-30 RX ORDER — CEFEPIME 1 G/1
2000 INJECTION, POWDER, FOR SOLUTION INTRAMUSCULAR; INTRAVENOUS EVERY 8 HOURS
Refills: 0 | Status: DISCONTINUED | OUTPATIENT
Start: 2023-11-30 | End: 2023-12-03

## 2023-11-30 RX ORDER — INSULIN LISPRO 100/ML
VIAL (ML) SUBCUTANEOUS AT BEDTIME
Refills: 0 | Status: DISCONTINUED | OUTPATIENT
Start: 2023-11-30 | End: 2023-12-06

## 2023-11-30 RX ADMIN — ATORVASTATIN CALCIUM 80 MILLIGRAM(S): 80 TABLET, FILM COATED ORAL at 22:59

## 2023-11-30 RX ADMIN — Medication 5 MG/HR: at 08:27

## 2023-11-30 RX ADMIN — Medication 10 MILLIGRAM(S): at 22:58

## 2023-11-30 RX ADMIN — GABAPENTIN 400 MILLIGRAM(S): 400 CAPSULE ORAL at 13:09

## 2023-11-30 RX ADMIN — Medication 3 MILLILITER(S): at 08:31

## 2023-11-30 RX ADMIN — CEFEPIME 1000 MILLIGRAM(S): 1 INJECTION, POWDER, FOR SOLUTION INTRAMUSCULAR; INTRAVENOUS at 10:40

## 2023-11-30 RX ADMIN — Medication 1 MILLIGRAM(S): at 13:09

## 2023-11-30 RX ADMIN — Medication 125 MILLIGRAM(S): at 08:26

## 2023-11-30 RX ADMIN — CEFEPIME 2000 MILLIGRAM(S): 1 INJECTION, POWDER, FOR SOLUTION INTRAMUSCULAR; INTRAVENOUS at 13:44

## 2023-11-30 RX ADMIN — Medication 15 MILLIGRAM(S): at 08:49

## 2023-11-30 RX ADMIN — BUDESONIDE AND FORMOTEROL FUMARATE DIHYDRATE 2 PUFF(S): 160; 4.5 AEROSOL RESPIRATORY (INHALATION) at 21:14

## 2023-11-30 RX ADMIN — Medication 75 MILLIGRAM(S): at 23:51

## 2023-11-30 RX ADMIN — Medication 1 MILLIGRAM(S): at 23:51

## 2023-11-30 RX ADMIN — Medication 4: at 17:31

## 2023-11-30 RX ADMIN — Medication 1 TABLET(S): at 13:08

## 2023-11-30 RX ADMIN — QUETIAPINE FUMARATE 100 MILLIGRAM(S): 200 TABLET, FILM COATED ORAL at 22:58

## 2023-11-30 RX ADMIN — Medication 25 MILLIGRAM(S): at 22:58

## 2023-11-30 RX ADMIN — Medication 75 MILLIGRAM(S): at 13:11

## 2023-11-30 RX ADMIN — Medication 81 MILLIGRAM(S): at 13:10

## 2023-11-30 RX ADMIN — CLOPIDOGREL BISULFATE 75 MILLIGRAM(S): 75 TABLET, FILM COATED ORAL at 13:10

## 2023-11-30 RX ADMIN — Medication 650 MILLIGRAM(S): at 23:29

## 2023-11-30 RX ADMIN — Medication 650 MILLIGRAM(S): at 22:59

## 2023-11-30 RX ADMIN — SODIUM CHLORIDE 500 MILLILITER(S): 9 INJECTION INTRAMUSCULAR; INTRAVENOUS; SUBCUTANEOUS at 08:49

## 2023-11-30 RX ADMIN — Medication 1000 UNIT(S): at 13:10

## 2023-11-30 RX ADMIN — ENOXAPARIN SODIUM 40 MILLIGRAM(S): 100 INJECTION SUBCUTANEOUS at 22:58

## 2023-11-30 RX ADMIN — Medication 40 MILLIGRAM(S): at 13:09

## 2023-11-30 RX ADMIN — Medication 110 MILLIGRAM(S): at 13:45

## 2023-11-30 RX ADMIN — ALBUTEROL 2 PUFF(S): 90 AEROSOL, METERED ORAL at 21:32

## 2023-11-30 RX ADMIN — GABAPENTIN 400 MILLIGRAM(S): 400 CAPSULE ORAL at 22:59

## 2023-11-30 RX ADMIN — Medication 100 MILLIGRAM(S): at 13:09

## 2023-11-30 RX ADMIN — PANTOPRAZOLE SODIUM 40 MILLIGRAM(S): 20 TABLET, DELAYED RELEASE ORAL at 13:10

## 2023-11-30 RX ADMIN — CEFEPIME 2000 MILLIGRAM(S): 1 INJECTION, POWDER, FOR SOLUTION INTRAMUSCULAR; INTRAVENOUS at 22:57

## 2023-11-30 NOTE — PHARMACOTHERAPY INTERVENTION NOTE - COMMENTS
Medication history complete, patient is from [facility], reviewed and confirmed medication with list provided by facility.
Vancomycin dose corrected to 2000mg for patients weight as per ED Vancomycin first dose policy.

## 2023-11-30 NOTE — H&P ADULT - ASSESSMENT
A:    68M  HD # 1  FULL CODE    Here for:    1. Acute hypoxic resp failure 2/2  2. Influenza A PNA  3. HEVER  4. Chronic resp failure  5. AECOPD    This patient requires critical care for support of one or more vital organ systems with a high probability of imminent or life threatening deterioration in his/her condition    P:    Flu PNA, ? bacterial infx as well  d/c from  yesterday    RVP not sent last admit; ?has this been flu all along    Start tamiflu 75mg PO BID  ID consult  Start abx; was on docycyline as outpt; given need for bipap and acute resp failure, will start IV, send PCT, obtain Cx's; cefepime given in ER, tolerated well; will use cefepime/doxy, ID consult  NIV, actively titrate as able  CT chest to better elucidate underlying pathology  Diet  ISS  Cardizem drip for rate control; Hx of AF, not on AC 2/2 GIB; continue home LA cardizem  VTE PUD ppx  No s/s active bleeding  ISS, BG < 180  Minimize invasive lines and catheters    Dispo: Cont critical care.    Date of entry of this note is equal to the date of services rendered    TOTAL CRITICAL CARE TIME: 115 minutes   (EXCLUSIVE of any non bundled procedures)    Note: This is a critically ill patient. Time spent has been in salvage of life, limb and vital organ systems. This time INCLUDES time spent directly as this patient's bedside with evaluation and management, review of chart including review of laboratory and imaging studies, interpretation of vital signs and cardiac output measurements, any necessary ventilator management, and time spent discussing plan of care with patient and family, including goals of care discussion. This also includes time spent in multidisciplinary discussion with care team and various consultants to optimize treatment plan. This time may NOT include various procedures, which will be noted seperately.

## 2023-11-30 NOTE — ED PROVIDER NOTE - CRITICAL CARE ATTENDING CONTRIBUTION TO CARE
direct patient care (not related to procedure), additional history taking, interpretation of diagnostic studies, documentation, consultation with other physicians

## 2023-11-30 NOTE — ED PROVIDER NOTE - CLINICAL SUMMARY MEDICAL DECISION MAKING FREE TEXT BOX
Respiratory failure, rapid afib. Lungs w/diffuse wheeze.  ?COPD exac +/- ?CHF, infection?  Plan: labs, XR, neb, resp support w/BiPAP, abx, admit

## 2023-11-30 NOTE — ED ADULT TRIAGE NOTE - CHIEF COMPLAINT QUOTE
Pt. A&OX3, BIBEMS with c/o difficulty breathing. EMS reports pt woke up in respiratory distress. Nurse on scene stated he was turning blue. Normally on 5L nasal cannula but sating 88%. Pt found to be in rapid Afib at 188. Pt did not tolerate nonrebreather en route to hospital. respiratory rate was 32 with bilateral wheezing and labored. Pt hypotensive to 92/58. Pt was discharged from the hospital yesterday after being here for approximately 12 days.

## 2023-11-30 NOTE — ED ADULT NURSE NOTE - OBJECTIVE STATEMENT
pt presents to ED from home. hx afib, COPD, CHF, L BKA. wears 5L NC at home. recent discharge from  ED after admission for vertigo yesterday. pt presents to ED from home. hx afib, COPD, CHF, L BKA. wears 5L NC at home. recent discharge from  ED after admission for vertigo yesterday. c/o SOB today. hypoxic to 86% on 5L NC. pt arrives to ED hypoxic, labored breathing. placed on BiPAP with improvement. pt tachycardic in 180-200s afib. IV access established 20g to R AC. cardizem given as per MAR. cardiac, SpO2, BP monitoring in place.

## 2023-11-30 NOTE — ED ADULT NURSE NOTE - NSFALLRISKINTERV_ED_ALL_ED

## 2023-11-30 NOTE — ED PROVIDER NOTE - CARE PLAN
Principal Discharge DX:	Acute respiratory failure with hypoxia  Secondary Diagnosis:	Atrial fibrillation, rapid   1 Principal Discharge DX:	Acute respiratory failure with hypoxia  Secondary Diagnosis:	Atrial fibrillation, rapid  Secondary Diagnosis:	Influenza A

## 2023-11-30 NOTE — PATIENT PROFILE ADULT - FALL HARM RISK - HARM RISK INTERVENTIONS
Assistance with ambulation/Assistance OOB with selected safe patient handling equipment/Communicate Risk of Fall with Harm to all staff/Discuss with provider need for PT consult/Monitor gait and stability/Provide patient with walking aids - walker, cane, crutches/Reinforce activity limits and safety measures with patient and family/Sit up slowly, dangle for a short time, stand at bedside before walking/Tailored Fall Risk Interventions/Visual Cue: Yellow wristband and red socks/Bed in lowest position, wheels locked, appropriate side rails in place/Call bell, personal items and telephone in reach/Instruct patient to call for assistance before getting out of bed or chair/Non-slip footwear when patient is out of bed/[place] to call system/Physically safe environment - no spills, clutter or unnecessary equipment/Purposeful Proactive Rounding/Room/bathroom lighting operational, light cord in reach

## 2023-11-30 NOTE — ED PROVIDER NOTE - OBJECTIVE STATEMENT
68M anemia, Afib, CHF, COPD, EtOH, HTN, d/c'd yesterday following admission for vertigo returns this am in mod-severe respiratory distress, afib w/RVR.  Initially hypoxic, improves on oxygen to low 90's.  Patient confirms DNR/DNI but will accept trial of NIV

## 2023-11-30 NOTE — H&P ADULT - NSHPLABSRESULTS_GEN_ALL_CORE
LABS:    CBC Full  -  ( 30 Nov 2023 08:18 )  WBC Count : 14.81 K/uL  RBC Count : 4.56 M/uL  Hemoglobin : 12.8 g/dL  Hematocrit : 40.2 %  Platelet Count - Automated : 200 K/uL  Mean Cell Volume : 88.2 fl  Mean Cell Hemoglobin : 28.1 pg  Mean Cell Hemoglobin Concentration : 31.8 gm/dL  Auto Neutrophil # : 13.53 K/uL  Auto Lymphocyte # : 0.50 K/uL  Auto Monocyte # : 0.66 K/uL  Auto Eosinophil # : 0.02 K/uL  Auto Basophil # : 0.02 K/uL  Auto Neutrophil % : 91.4 %  Auto Lymphocyte % : 3.4 %  Auto Monocyte % : 4.5 %  Auto Eosinophil % : 0.1 %  Auto Basophil % : 0.1 %    11-30    139  |  104  |  24<H>  ----------------------------<  94  3.7   |  28  |  1.06    Ca    8.5      30 Nov 2023 08:18    TPro  6.8  /  Alb  2.8<L>  /  TBili  1.0  /  DBili  x   /  AST  14<L>  /  ALT  27  /  AlkPhos  85  11-30      Urinalysis Basic - ( 30 Nov 2023 08:18 )    Color: x / Appearance: x / SG: x / pH: x  Gluc: 94 mg/dL / Ketone: x  / Bili: x / Urobili: x   Blood: x / Protein: x / Nitrite: x   Leuk Esterase: x / RBC: x / WBC x   Sq Epi: x / Non Sq Epi: x / Bacteria: x      CAPILLARY BLOOD GLUCOSE            LIVER FUNCTIONS - ( 30 Nov 2023 08:18 )  Alb: 2.8 g/dL / Pro: 6.8 gm/dL / ALK PHOS: 85 U/L / ALT: 27 U/L / AST: 14 U/L / GGT: x

## 2023-11-30 NOTE — CONSULT NOTE ADULT - SUBJECTIVE AND OBJECTIVE BOX
69 yo M PMHx multiple medical problems, including HFpEF, atrial fibrillation s/p Watchman (not anticoagulated due to GI bleeding), mod MR, COPD on 4-5 L of home O2,  etOH-induced cirrhosis, DT s/p trach now decannulated, HTN, obesity, VIJAY (noncompliant w/ CPAP), alcoholism, severe pulmonary HTN, Left BKA, PAD, who presented to the ED < 24 hours after hospital discharge with complaints of severe dyspnea after apparently sleeping without supplemental O2 last night.  He was hospitalized from 11/17-11/29 with acute CVA associated with R ICA stenosis + exacerbation of COPD (treated with prednisone and doxycycline). He thinks his nasal cannula fell out of his nose last night and he awakened with great difficulty breathing.  He was apparently found to be hypoxic, cyanotic, and in AF with RVR when evaluated by EMS. In the ED he was tachycardic to ~ 200 bpm and in respiratory distress; treated with BipAP and IV diltiazem. 11/30: On bipap, cardizem drip for rate control. + flu. Given IV cefepime/doxycycline for superimposed bacterial pna coverage.       PMH: as above  PSH: as above    Meds: per reconciliation sheet, noted below  MEDICATIONS  (STANDING):  aspirin enteric coated 81 milliGRAM(s) Oral daily  atorvastatin 80 milliGRAM(s) Oral at bedtime  budesonide 160 MICROgram(s)/formoterol 4.5 MICROgram(s) Inhaler 2 Puff(s) Inhalation two times a day  busPIRone 10 milliGRAM(s) Oral two times a day  cefepime  Injectable. 2000 milliGRAM(s) IV Push every 8 hours  cholecalciferol 1000 Unit(s) Oral daily  clopidogrel Tablet 75 milliGRAM(s) Oral daily  cyanocobalamin 1000 MICROGram(s) Oral daily  dextrose 50% Injectable 12.5 Gram(s) IV Push once  dextrose 50% Injectable 25 Gram(s) IV Push once  dextrose 50% Injectable 25 Gram(s) IV Push once  dextrose Oral Gel 15 Gram(s) Oral once  diltiazem    milliGRAM(s) Oral daily  diltiazem Infusion 5 mG/Hr (5 mL/Hr) IV Continuous <Continuous>  doxazosin 1 milliGRAM(s) Oral at bedtime  doxycycline IVPB 100 milliGRAM(s) IV Intermittent every 12 hours  doxycycline IVPB      enoxaparin Injectable 40 milliGRAM(s) SubCutaneous every 24 hours  folic acid 1 milliGRAM(s) Oral daily  furosemide    Tablet 40 milliGRAM(s) Oral daily  gabapentin 400 milliGRAM(s) Oral three times a day  glucagon  Injectable 1 milliGRAM(s) IntraMuscular once  insulin lispro (ADMELOG) corrective regimen sliding scale   SubCutaneous at bedtime  insulin lispro (ADMELOG) corrective regimen sliding scale   SubCutaneous three times a day before meals  metoprolol tartrate 25 milliGRAM(s) Oral two times a day  multivitamin 1 Tablet(s) Oral daily  oseltamivir 75 milliGRAM(s) Oral two times a day  pantoprazole    Tablet 40 milliGRAM(s) Oral before breakfast  psyllium Powder 2 Packet(s) Oral daily  QUEtiapine 100 milliGRAM(s) Oral at bedtime  thiamine 100 milliGRAM(s) Oral daily  tiotropium 2.5 MICROgram(s) Inhaler 2 Puff(s) Inhalation daily      Allergies    Duricef (Rash)  Ceclor (Rash)    Intolerances      Social: no smoking, no alcohol, no illegal drugs; no recent travel, no exposure to TB  FAMILY HISTORY:  No pertinent family history in first degree relatives       no history of premature cardiovascular disease in first degree relatives    ROS: the patient denies fever, no chills, no HA, no dizziness, no sore throat, no blurry vision, no CP, no palpitations, + SOB, + cough, no abdominal pain, no diarrhea, no N/V, no dysuria, no leg pain, no claudication, no rash, no joint aches, no rectal pain or bleeding, no night sweats    All other systems reviewed and are negative    Vital Signs Last 24 Hrs  T(C): 36.2 (30 Nov 2023 12:00), Max: 36.9 (30 Nov 2023 08:18)  T(F): 97.2 (30 Nov 2023 12:00), Max: 98.5 (30 Nov 2023 08:18)  HR: 119 (30 Nov 2023 12:00) (110 - 169)  BP: 120/85 (30 Nov 2023 12:00) (85/61 - 120/85)  BP(mean): 97 (30 Nov 2023 12:00) (59 - 105)  RR: 28 (30 Nov 2023 12:00) (23 - 34)  SpO2: 96% (30 Nov 2023 12:00) (95% - 98%)    Parameters below as of 30 Nov 2023 11:00  Patient On (Oxygen Delivery Method): BiPAP/CPAP      Daily Height in cm: 165.1 (30 Nov 2023 08:14)    Daily     PE:  Constitutional: NAD   HEENT: NC/AT, EOMI, PERRLA, conjunctivae clear; ears and nose atraumatic; pharynx benign  Neck: supple; thyroid not palpable  Back: no tenderness  Respiratory: decreased breath sounds, rhonchi   Cardiovascular: S1S2 regular, no murmurs  Abdomen: soft, not tender, not distended, positive BS; liver and spleen WNL  Genitourinary: no suprapubic tenderness  Lymphatic: no LN palpable  Musculoskeletal: no muscle tenderness, no joint swelling or tenderness  Extremities: no pedal edema  Neurological/ Psychiatric: AxOx3, Judgement and insight normal;  moving all extremities  Skin: no rashes; no palpable lesions    Labs: all available labs reviewed                        12.8   14.81 )-----------( 200      ( 30 Nov 2023 08:18 )             40.2     11-30    139  |  104  |  24<H>  ----------------------------<  94  3.7   |  28  |  1.06    Ca    8.5      30 Nov 2023 08:18    TPro  6.8  /  Alb  2.8<L>  /  TBili  1.0  /  DBili  x   /  AST  14<L>  /  ALT  27  /  AlkPhos  85  11-30     LIVER FUNCTIONS - ( 30 Nov 2023 08:18 )  Alb: 2.8 g/dL / Pro: 6.8 gm/dL / ALK PHOS: 85 U/L / ALT: 27 U/L / AST: 14 U/L / GGT: x           Urinalysis Basic - ( 30 Nov 2023 08:18 )    Color: x / Appearance: x / SG: x / pH: x  Gluc: 94 mg/dL / Ketone: x  / Bili: x / Urobili: x   Blood: x / Protein: x / Nitrite: x   Leuk Esterase: x / RBC: x / WBC x   Sq Epi: x / Non Sq Epi: x / Bacteria: x          Radiology: all available radiological tests reviewed  < from: CT Chest No Cont (11.24.23 @ 11:36) >    ACC: [id number] EXAM:  CT CHEST   ORDERED BY: TAMMY NAVA     PROCEDURE DATE:  11/24/2023          INTERPRETATION:  EXAMINATION: CT CHEST    CLINICAL INDICATION: Respiratory failure.    TECHNIQUE: Noncontrast CT of the chest was obtained.    COMPARISON: Multiple CT, most recent 6/30/2023.    FINDINGS:    AIRWAYS AND LUNGS: Tracheal bronchial secretions.  Peripheral   reticulation with associated traction bronchiectasis and patchy opacity   that is increased from the prior study. No honeycombing.    MEDIASTINUM AND PLEURA: Mildly enlarged mediastinal lymph nodes. The   visualized portion of the thyroid gland is heterogeneous. There is no   pleural effusion. There is no pneumothorax.    HEART AND VESSELS: There is cardiomegaly.  There are atherosclerotic   calcifications of the aorta and coronary arteries.  There is no   pericardial effusion.  Left atrial appendage closure device.    UPPER ABDOMEN: Images of the upper abdomen demonstrate redemonstrated   splenic hypodensity..    BONES AND SOFT TISSUES: The bones are unremarkable.  The soft tissues are   unremarkable.    IMPRESSION:  Interstitial lung disease with increased groundglass opacity compared to   prior studies and may be infectious/inflammatory (including active   interstitial lung disease) versus fluid overload.        Advanced directives addressed: full resuscitation
  CHIEF COMPLAINT: Patient is a 68y old  Male who presents with a chief complaint of difficulty breathing.    HPI: Chronically-ill 68 year old man with multiple medical problems, including HFpEF, atrial fibrillation s/p Watchman (not anticoagulated due to GI bleeding), mod MR, COPD on 4-5 L of home O2,  etOH-induced cirrhosis, DT s/p trach now decannulated, HTN, obesity, VIJAY (noncompliant w/ CPAP), alcoholism, severe pulmonary HTN, Left BKA, PAD, who presented to the ED < 24 hours after hospital discharge with complaints of severe dyspnea after apparently sleeping without supplemental O2 last night.  He was hospitalized from 11/17-11/29 with acute CVA associated with R ICA stenosis + exacerbation of COPD (treated with prednisone and doxycycline). He thinks his nasal cannula fell out of his nose last night and he awakened with great difficulty breathing.  He was apparently found to be hypoxic, cyanotic, and in AF with RVR when evaluated by EMS. In the ED he was tachycardic to ~ 200 bpm and in respiratory distress; treated with BipAP and IV diltiazem.    PAST MEDICAL & SURGICAL HISTORY:  Chronic atrial fibrillation  Alcohol abuse  Poor historian  CHF (congestive heart failure)  Cirrhosis  Emphysema of lung  Alcohol withdrawal  Collapsed lung  Meningitis  Falls  Anemia  Chronic obstructive pulmonary disease (COPD)  GERD (gastroesophageal reflux disease)  Hypertension  Presence of Watchman left atrial appendage closure device  S/P BKA (below knee amputation) unilateral, left  H/O tracheostomy now removed  S/P percutaneous endoscopic gastrostomy (PEG) tube placement now removed    SOCIAL HISTORY:   Alcohol: History of alcoholism now not drinking  Smoking: Former smoker  Resident of assisted living facility    FAMILY HISTORY: No pertinent family history in first degree relatives    MEDICATIONS  (STANDING):  cefepime   IVPB 1000 milliGRAM(s) IV Intermittent once  diltiazem Infusion 5 mG/Hr (5 mL/Hr) IV Continuous <Continuous>  vancomycin  IVPB. 1000 milliGRAM(s) IV Intermittent once    Allergies:  Duricef (Rash)  Ceclor (Rash)    REVIEW OF SYSTEMS:  CONSTITUTIONAL: No fevers or chills  Eyes: No visual changes  NECK: No pain or stiffness  RESPIRATORY: + cough, + shortness of breath  CARDIOVASCULAR: No chest pain  GASTROINTESTINAL: No abdominal pain. No nausea, vomiting.  GENITOURINARY: No dysuria, frequency or hematuria  NEUROLOGICAL: No numbness.  SKIN: No itching or rash  All other review of systems is negative unless indicated above    VITAL SIGNS:   Vital Signs Last 24 Hrs  T(C): 36.9 (30 Nov 2023 08:18), Max: 36.9 (30 Nov 2023 08:18)  T(F): 98.5 (30 Nov 2023 08:18), Max: 98.5 (30 Nov 2023 08:18)  HR: 154 (30 Nov 2023 08:20) (78 - 169)  BP: 113/91 (30 Nov 2023 08:18) (113/91 - 113/91)  BP(mean): 97 (30 Nov 2023 08:18) (97 - 97)  RR: 30 (30 Nov 2023 08:18) (30 - 31)  SpO2: 95% (30 Nov 2023 08:20) (95% - 98%)  Patient On (Oxygen Delivery Method): mask, nonrebreather    PHYSICAL EXAM:  Constitutional: NAD, awake and alert, obese, mild   HEENT:  EOMI  Pulmonary: Labored and on BiPAP in ER, tachypneic, scattered rhonchi  Cardiovascular: Irregular, tachycardic, irregular  Gastrointestinal: Bowel Sounds present, soft, nontender.   Lymph: Minimal edema. No cervical lymphadenopathy.  Neurological: Alert, no focal deficits  Skin: No rashes.  Psych:  Mood & affect appropriate    LABS:                   12.8   14.81 )-----------( 200      ( 30 Nov 2023 08:18 )             40.2     Tele:  AF w/ RVR    CT Chest No Cont (11.24.23 @ 11:36):  Interstitial lung disease with increased groundglass opacity compared to prior studies and may be infectious/inflammatory (including active interstitial lung disease) versus fluid overload.    MYA Complete w/Spect and Color (11.20.23 @ 15:07):   Normal left ventricular size and systolic function. Estimated LVEF is 60-65%.  Left atrial occlusion device present with small jose-device leak. No evidence of jose-device thrombus. Moderate mitral and tricuspid regurgitation.

## 2023-11-30 NOTE — CONSULT NOTE ADULT - PROBLEM SELECTOR RECOMMENDATION 2
Respiratory failure is probably multifactorial and related to COPD (including recent exacerbation), lack of supplemental O2 last night, CHF (acute/chronic related to AF + RVR); continue BiPAP - with optimization as per pulmonary/critical care/hospital medicine.

## 2023-11-30 NOTE — CONSULT NOTE ADULT - ASSESSMENT
69 yo M PMHx multiple medical problems, including HFpEF, atrial fibrillation s/p Watchman (not anticoagulated due to GI bleeding), mod MR, COPD on 4-5 L of home O2,  etOH-induced cirrhosis, DT s/p trach now decannulated, HTN, obesity, VIJAY (noncompliant w/ CPAP), alcoholism, severe pulmonary HTN, Left BKA, PAD, who presented to the ED < 24 hours after hospital discharge with complaints of severe dyspnea after apparently sleeping without supplemental O2 last night.  He was hospitalized from 11/17-11/29 with acute CVA associated with R ICA stenosis + exacerbation of COPD (treated with prednisone and doxycycline). He thinks his nasal cannula fell out of his nose last night and he awakened with great difficulty breathing.  He was apparently found to be hypoxic, cyanotic, and in AF with RVR when evaluated by EMS. In the ED he was tachycardic to ~ 200 bpm and in respiratory distress; treated with BipAP and IV diltiazem. 11/30: On bipap, cardizem drip for rate control. + flu. Given IV cefepime/doxycycline for superimposed bacterial pna coverage.     1. Acute on chronic respiratory failure. Viral syndrome. Influenza. Superimposed bacterial pneumonia. COPD exacerbation.   -imaging reviewed  - agree with IV cefepime 2gmq8h  - on doxycycline 100mg BID  - f/u cultures, check sputum cx  - on tamiflu 75mg BID 5 day course  - pulmonary f/u   - isolation precautions  - tolerating abx well so far; no side effects noted  - reason for abx use and side effects reviewed with patient  - supportive care  - fu cbc    2. other issues - care per medicine

## 2023-11-30 NOTE — CONSULT NOTE ADULT - PROBLEM SELECTOR RECOMMENDATION 9
Permanent atrial fibrillation -- severe tachycardia in ER upon presentation (likely related to respiratory distress); continue IV diltiazem to slow HR; no anticoagulation due to Watchman device and prior bleed.    * I discussed case with Dr Nieto.

## 2023-11-30 NOTE — ED PROVIDER NOTE - PROGRESS NOTE DETAILS
Slightly improved on BiPAP.  XR w/?new RML infiltrate  Elev WBC, lacate 3 -- will cover for HCAP. Continuing rate control with cardizem  CCM and cardiology consulted.

## 2023-11-30 NOTE — H&P ADULT - HISTORY OF PRESENT ILLNESS
CCU Admit H&P    S:    Pt seen and examined  HD # 1  FULL CODE  PMHx multiple medical problems, including HFpEF, atrial fibrillation s/p Watchman (not anticoagulated due to GI bleeding), mod MR, COPD on 4-5 L of home O2,  etOH-induced cirrhosis, DT s/p trach now decannulated, HTN, obesity, VIJAY (noncompliant w/ CPAP), alcoholism, severe pulmonary HTN, Left BKA, PAD, who presented to the ED < 24 hours after hospital discharge with complaints of severe dyspnea after apparently sleeping without supplemental O2 last night.  He was hospitalized from 11/17-11/29 with acute CVA associated with R ICA stenosis + exacerbation of COPD (treated with prednisone and doxycycline). He thinks his nasal cannula fell out of his nose last night and he awakened with great difficulty breathing.  He was apparently found to be hypoxic, cyanotic, and in AF with RVR when evaluated by EMS. In the ED he was tachycardic to ~ 200 bpm and in respiratory distress; treated with BipAP and IV diltiazem.    11/30: On bipap, cardizem drip for rate control. + flu.

## 2023-12-01 LAB
ALBUMIN SERPL ELPH-MCNC: 2.4 G/DL — LOW (ref 3.3–5)
ALBUMIN SERPL ELPH-MCNC: 2.4 G/DL — LOW (ref 3.3–5)
ALP SERPL-CCNC: 60 U/L — SIGNIFICANT CHANGE UP (ref 40–120)
ALP SERPL-CCNC: 60 U/L — SIGNIFICANT CHANGE UP (ref 40–120)
ALT FLD-CCNC: 20 U/L — SIGNIFICANT CHANGE UP (ref 12–78)
ALT FLD-CCNC: 20 U/L — SIGNIFICANT CHANGE UP (ref 12–78)
ANION GAP SERPL CALC-SCNC: 6 MMOL/L — SIGNIFICANT CHANGE UP (ref 5–17)
ANION GAP SERPL CALC-SCNC: 6 MMOL/L — SIGNIFICANT CHANGE UP (ref 5–17)
AST SERPL-CCNC: 12 U/L — LOW (ref 15–37)
AST SERPL-CCNC: 12 U/L — LOW (ref 15–37)
BILIRUB SERPL-MCNC: 0.8 MG/DL — SIGNIFICANT CHANGE UP (ref 0.2–1.2)
BILIRUB SERPL-MCNC: 0.8 MG/DL — SIGNIFICANT CHANGE UP (ref 0.2–1.2)
BUN SERPL-MCNC: 31 MG/DL — HIGH (ref 7–23)
BUN SERPL-MCNC: 31 MG/DL — HIGH (ref 7–23)
CALCIUM SERPL-MCNC: 8.4 MG/DL — LOW (ref 8.5–10.1)
CALCIUM SERPL-MCNC: 8.4 MG/DL — LOW (ref 8.5–10.1)
CHLORIDE SERPL-SCNC: 102 MMOL/L — SIGNIFICANT CHANGE UP (ref 96–108)
CHLORIDE SERPL-SCNC: 102 MMOL/L — SIGNIFICANT CHANGE UP (ref 96–108)
CO2 SERPL-SCNC: 29 MMOL/L — SIGNIFICANT CHANGE UP (ref 22–31)
CO2 SERPL-SCNC: 29 MMOL/L — SIGNIFICANT CHANGE UP (ref 22–31)
CREAT SERPL-MCNC: 0.95 MG/DL — SIGNIFICANT CHANGE UP (ref 0.5–1.3)
CREAT SERPL-MCNC: 0.95 MG/DL — SIGNIFICANT CHANGE UP (ref 0.5–1.3)
EGFR: 88 ML/MIN/1.73M2 — SIGNIFICANT CHANGE UP
EGFR: 88 ML/MIN/1.73M2 — SIGNIFICANT CHANGE UP
GLUCOSE BLDC GLUCOMTR-MCNC: 117 MG/DL — HIGH (ref 70–99)
GLUCOSE BLDC GLUCOMTR-MCNC: 117 MG/DL — HIGH (ref 70–99)
GLUCOSE BLDC GLUCOMTR-MCNC: 127 MG/DL — HIGH (ref 70–99)
GLUCOSE BLDC GLUCOMTR-MCNC: 127 MG/DL — HIGH (ref 70–99)
GLUCOSE BLDC GLUCOMTR-MCNC: 131 MG/DL — HIGH (ref 70–99)
GLUCOSE BLDC GLUCOMTR-MCNC: 131 MG/DL — HIGH (ref 70–99)
GLUCOSE BLDC GLUCOMTR-MCNC: 139 MG/DL — HIGH (ref 70–99)
GLUCOSE BLDC GLUCOMTR-MCNC: 139 MG/DL — HIGH (ref 70–99)
GLUCOSE SERPL-MCNC: 123 MG/DL — HIGH (ref 70–99)
GLUCOSE SERPL-MCNC: 123 MG/DL — HIGH (ref 70–99)
HCT VFR BLD CALC: 31.3 % — LOW (ref 39–50)
HCT VFR BLD CALC: 31.3 % — LOW (ref 39–50)
HGB BLD-MCNC: 9.9 G/DL — LOW (ref 13–17)
HGB BLD-MCNC: 9.9 G/DL — LOW (ref 13–17)
MAGNESIUM SERPL-MCNC: 1.7 MG/DL — SIGNIFICANT CHANGE UP (ref 1.6–2.6)
MAGNESIUM SERPL-MCNC: 1.7 MG/DL — SIGNIFICANT CHANGE UP (ref 1.6–2.6)
MCHC RBC-ENTMCNC: 28 PG — SIGNIFICANT CHANGE UP (ref 27–34)
MCHC RBC-ENTMCNC: 28 PG — SIGNIFICANT CHANGE UP (ref 27–34)
MCHC RBC-ENTMCNC: 31.6 GM/DL — LOW (ref 32–36)
MCHC RBC-ENTMCNC: 31.6 GM/DL — LOW (ref 32–36)
MCV RBC AUTO: 88.7 FL — SIGNIFICANT CHANGE UP (ref 80–100)
MCV RBC AUTO: 88.7 FL — SIGNIFICANT CHANGE UP (ref 80–100)
PHOSPHATE SERPL-MCNC: 3.6 MG/DL — SIGNIFICANT CHANGE UP (ref 2.5–4.5)
PHOSPHATE SERPL-MCNC: 3.6 MG/DL — SIGNIFICANT CHANGE UP (ref 2.5–4.5)
PLATELET # BLD AUTO: 147 K/UL — LOW (ref 150–400)
PLATELET # BLD AUTO: 147 K/UL — LOW (ref 150–400)
POTASSIUM SERPL-MCNC: 4.2 MMOL/L — SIGNIFICANT CHANGE UP (ref 3.5–5.3)
POTASSIUM SERPL-MCNC: 4.2 MMOL/L — SIGNIFICANT CHANGE UP (ref 3.5–5.3)
POTASSIUM SERPL-SCNC: 4.2 MMOL/L — SIGNIFICANT CHANGE UP (ref 3.5–5.3)
POTASSIUM SERPL-SCNC: 4.2 MMOL/L — SIGNIFICANT CHANGE UP (ref 3.5–5.3)
PROT SERPL-MCNC: 6 GM/DL — SIGNIFICANT CHANGE UP (ref 6–8.3)
PROT SERPL-MCNC: 6 GM/DL — SIGNIFICANT CHANGE UP (ref 6–8.3)
RBC # BLD: 3.53 M/UL — LOW (ref 4.2–5.8)
RBC # BLD: 3.53 M/UL — LOW (ref 4.2–5.8)
RBC # FLD: 16.1 % — HIGH (ref 10.3–14.5)
RBC # FLD: 16.1 % — HIGH (ref 10.3–14.5)
SODIUM SERPL-SCNC: 137 MMOL/L — SIGNIFICANT CHANGE UP (ref 135–145)
SODIUM SERPL-SCNC: 137 MMOL/L — SIGNIFICANT CHANGE UP (ref 135–145)
WBC # BLD: 17.68 K/UL — HIGH (ref 3.8–10.5)
WBC # BLD: 17.68 K/UL — HIGH (ref 3.8–10.5)
WBC # FLD AUTO: 17.68 K/UL — HIGH (ref 3.8–10.5)
WBC # FLD AUTO: 17.68 K/UL — HIGH (ref 3.8–10.5)

## 2023-12-01 PROCEDURE — 99233 SBSQ HOSP IP/OBS HIGH 50: CPT

## 2023-12-01 PROCEDURE — 99231 SBSQ HOSP IP/OBS SF/LOW 25: CPT

## 2023-12-01 RX ORDER — MAGNESIUM SULFATE 500 MG/ML
2 VIAL (ML) INJECTION ONCE
Refills: 0 | Status: COMPLETED | OUTPATIENT
Start: 2023-12-01 | End: 2023-12-01

## 2023-12-01 RX ORDER — LANOLIN ALCOHOL/MO/W.PET/CERES
5 CREAM (GRAM) TOPICAL AT BEDTIME
Refills: 0 | Status: DISCONTINUED | OUTPATIENT
Start: 2023-12-01 | End: 2023-12-01

## 2023-12-01 RX ORDER — ACETAMINOPHEN 500 MG
650 TABLET ORAL ONCE
Refills: 0 | Status: DISCONTINUED | OUTPATIENT
Start: 2023-12-01 | End: 2023-12-01

## 2023-12-01 RX ORDER — DILTIAZEM HCL 120 MG
180 CAPSULE, EXT RELEASE 24 HR ORAL DAILY
Refills: 0 | Status: DISCONTINUED | OUTPATIENT
Start: 2023-12-01 | End: 2023-12-06

## 2023-12-01 RX ADMIN — Medication 100 MILLIGRAM(S): at 11:07

## 2023-12-01 RX ADMIN — Medication 75 MILLIGRAM(S): at 10:10

## 2023-12-01 RX ADMIN — Medication 1 MILLIGRAM(S): at 22:30

## 2023-12-01 RX ADMIN — Medication 10 MILLIGRAM(S): at 22:30

## 2023-12-01 RX ADMIN — CEFEPIME 2000 MILLIGRAM(S): 1 INJECTION, POWDER, FOR SOLUTION INTRAMUSCULAR; INTRAVENOUS at 06:26

## 2023-12-01 RX ADMIN — CLOPIDOGREL BISULFATE 75 MILLIGRAM(S): 75 TABLET, FILM COATED ORAL at 10:11

## 2023-12-01 RX ADMIN — Medication 5 MILLIGRAM(S): at 02:02

## 2023-12-01 RX ADMIN — PANTOPRAZOLE SODIUM 40 MILLIGRAM(S): 20 TABLET, DELAYED RELEASE ORAL at 10:59

## 2023-12-01 RX ADMIN — ENOXAPARIN SODIUM 40 MILLIGRAM(S): 100 INJECTION SUBCUTANEOUS at 22:32

## 2023-12-01 RX ADMIN — Medication 110 MILLIGRAM(S): at 17:49

## 2023-12-01 RX ADMIN — Medication 40 MILLIGRAM(S): at 10:10

## 2023-12-01 RX ADMIN — QUETIAPINE FUMARATE 100 MILLIGRAM(S): 200 TABLET, FILM COATED ORAL at 22:31

## 2023-12-01 RX ADMIN — Medication 25 MILLIGRAM(S): at 11:08

## 2023-12-01 RX ADMIN — Medication 81 MILLIGRAM(S): at 10:10

## 2023-12-01 RX ADMIN — Medication 25 MILLIGRAM(S): at 22:30

## 2023-12-01 RX ADMIN — ALBUTEROL 2 PUFF(S): 90 AEROSOL, METERED ORAL at 20:50

## 2023-12-01 RX ADMIN — Medication 100 MILLIGRAM(S): at 10:09

## 2023-12-01 RX ADMIN — BUDESONIDE AND FORMOTEROL FUMARATE DIHYDRATE 2 PUFF(S): 160; 4.5 AEROSOL RESPIRATORY (INHALATION) at 09:03

## 2023-12-01 RX ADMIN — TIOTROPIUM BROMIDE 2 PUFF(S): 18 CAPSULE ORAL; RESPIRATORY (INHALATION) at 09:02

## 2023-12-01 RX ADMIN — GABAPENTIN 400 MILLIGRAM(S): 400 CAPSULE ORAL at 06:26

## 2023-12-01 RX ADMIN — ENOXAPARIN SODIUM 40 MILLIGRAM(S): 100 INJECTION SUBCUTANEOUS at 10:09

## 2023-12-01 RX ADMIN — Medication 25 MILLIGRAM(S): at 10:38

## 2023-12-01 RX ADMIN — GABAPENTIN 400 MILLIGRAM(S): 400 CAPSULE ORAL at 15:02

## 2023-12-01 RX ADMIN — Medication 1000 UNIT(S): at 10:11

## 2023-12-01 RX ADMIN — PREGABALIN 1000 MICROGRAM(S): 225 CAPSULE ORAL at 10:10

## 2023-12-01 RX ADMIN — GABAPENTIN 400 MILLIGRAM(S): 400 CAPSULE ORAL at 22:30

## 2023-12-01 RX ADMIN — Medication 75 MILLIGRAM(S): at 22:30

## 2023-12-01 RX ADMIN — ATORVASTATIN CALCIUM 80 MILLIGRAM(S): 80 TABLET, FILM COATED ORAL at 22:31

## 2023-12-01 RX ADMIN — Medication 3 MILLILITER(S): at 02:25

## 2023-12-01 RX ADMIN — Medication 180 MILLIGRAM(S): at 15:03

## 2023-12-01 RX ADMIN — Medication 3 MILLILITER(S): at 09:02

## 2023-12-01 RX ADMIN — CEFEPIME 2000 MILLIGRAM(S): 1 INJECTION, POWDER, FOR SOLUTION INTRAMUSCULAR; INTRAVENOUS at 22:31

## 2023-12-01 RX ADMIN — Medication 110 MILLIGRAM(S): at 06:26

## 2023-12-01 RX ADMIN — Medication 10 MILLIGRAM(S): at 10:10

## 2023-12-01 RX ADMIN — ALBUTEROL 2 PUFF(S): 90 AEROSOL, METERED ORAL at 13:52

## 2023-12-01 RX ADMIN — BUDESONIDE AND FORMOTEROL FUMARATE DIHYDRATE 2 PUFF(S): 160; 4.5 AEROSOL RESPIRATORY (INHALATION) at 20:50

## 2023-12-01 RX ADMIN — Medication 100 MILLIGRAM(S): at 22:32

## 2023-12-01 RX ADMIN — CEFEPIME 2000 MILLIGRAM(S): 1 INJECTION, POWDER, FOR SOLUTION INTRAMUSCULAR; INTRAVENOUS at 14:57

## 2023-12-01 RX ADMIN — Medication 1 MILLIGRAM(S): at 10:11

## 2023-12-01 RX ADMIN — Medication 1 TABLET(S): at 10:59

## 2023-12-01 RX ADMIN — Medication 25 GRAM(S): at 10:23

## 2023-12-01 NOTE — DIETITIAN INITIAL EVALUATION ADULT - OTHER CALCULATIONS
Kcal based on ADJUSTED # (-6% bed scale wt 295# obtained by RN 12/1)  Protein based on ADJUSTED # (-6% # based on ht 5'5")  Fluid based on ADJUSTED # (-6% bed scale wt 295# obtained by RN 12/1) to achieve 1500mL restriction energy and fluid needs calculated using adjusted BW; protein needs calculated using IBW   suggest 1500 mL FR

## 2023-12-01 NOTE — CHART NOTE - NSCHARTNOTEFT_GEN_A_CORE
Patient being downgraded out of ICU to hospitalist service. Patient seen in CCU and plan of care discussed with CCU attending Dr. Wheatley     In summary:  Pt is a 67 y/o Male with PMHx of Chronic resp failure 2/2 COPD (on 4-5L NC), chronic afib s/p watchman procedure (no AC due to hx GIB), HFpEF, PVD s/p L BKA, alcoholic liver cirrhosis, DT s/p trach now decannulated, HTN, VIJAY (noncompliant w/ CPAP), alcoholism, severe pulmonary HTN, morbid obesity, recent admission to  discharged on 11/29 for acute CVA (on DAPT), acute COPD exacerbation (discharged on prednisone taper), who presented to the ED < 24 hours after hospital discharge with complaints of severe dyspnea.    In the ED he was tachycardic to ~ 200 bpm and in respiratory distress; treated with BipAP and IV diltiazem. 11/30: On bipap, cardizem drip for rate control. + flu. Given IV cefepime/doxycycline for superimposed bacterial pna coverage. Admitted initially to ICU for further management.     A/P:     #Acute on chronic hypoxic respiratory failure, Sepsis (POA) 2/2 Acute COPD exac, influenza and poss superimposed bacterial PNA (GNR suspected)   - s/p ICU requiring Bipap, now weaned to home 4-5L NC  - CT chest with multifocal PNA  - Sepsis criteria on arrival: tachycardia, elevated WBC, RR > 20, hypotension   - C/w IV cefepime/IV doxy   - C/w Tamiflu     - C/w duonebs q6, inhalers   - ID following  - WBC rising 14.8-> 17.7 (received IV solumedrol 125mg x1), continue to trend  - BCx NGTD    #chronic Afib with RVR s/p watchman procedure  - No AC due to hx of GIB  - s/p cardizem gtt transitioned to Cardizem CD 180mg QD, continue   - C/w Lopressor 25mg BID  - Cardiology consulted Dr. King     #Morbid obesity  - BMI 44, Nutrition consult    #Anemia   - Hgb 12.8 -> 9.9 since arrival   - Continue to trend   - No obvious s/s of bleeding noted currently   - F/u iron studies     #DVT ppx   - Lovenox     #Hx of HFpEF, PVD s/p L BKA, alcoholic liver cirrhosis, DT s/p trach now decannulated, HTN, VIJAY (noncompliant w/ CPAP), alcoholism, severe pulmonary HTN, recent acute CVA  - c/w home meds   - on DAPT for recent CVA  - PT consulted: pt lives at [facility], hx of L BKA, family requesting wheelchair on d/c Patient being downgraded out of ICU to hospitalist service. Patient seen in CCU and plan of care discussed with CCU attending Dr. Wheatley     In summary:  Pt is a 67 y/o Male with PMHx of Chronic resp failure 2/2 COPD (on 4-5L NC), chronic afib s/p watchman procedure (no AC due to hx GIB), HFpEF, PVD s/p L BKA, alcoholic liver cirrhosis, DT s/p trach now decannulated, HTN, VIJAY (noncompliant w/ CPAP), alcoholism, severe pulmonary HTN, morbid obesity, recent admission to  discharged on 11/29 for acute CVA (on DAPT), acute COPD exacerbation (discharged on prednisone taper), who presented to the ED < 24 hours after hospital discharge with complaints of severe dyspnea.    In the ED he was tachycardic to ~ 200 bpm and in respiratory distress; treated with BipAP and IV diltiazem. 11/30: On bipap, cardizem drip for rate control. + flu. Given IV cefepime/doxycycline for superimposed bacterial pna coverage. Admitted initially to ICU for further management.     A/P:     #Acute on chronic hypoxic respiratory failure, Sepsis (POA) 2/2 Acute COPD exac, influenza and poss superimposed bacterial PNA (GNR suspected)   - s/p ICU requiring Bipap, now weaned to home 4-5L NC  - CT chest with multifocal PNA  - Sepsis criteria on arrival: tachycardia, elevated WBC, RR > 20, hypotension, lactate 2.6  - procalcitonin 10.1  - C/w IV cefepime/IV doxy   - C/w Tamiflu     - C/w duonebs q6, inhalers   - ID following  - WBC rising 14.8-> 17.7 (received IV solumedrol 125mg x1), continue to trend  - BCx NGTD    #chronic Afib with RVR s/p watchman procedure  - No AC due to hx of GIB  - s/p cardizem gtt transitioned to Cardizem CD 180mg QD, continue   - C/w Lopressor 25mg BID  - Cardiology consulted Dr. King     #Morbid obesity  - BMI 44, Nutrition consult    #Anemia   - Hgb 12.8 -> 9.9 since arrival   - Continue to trend   - No obvious s/s of bleeding noted currently   - F/u iron studies     #DVT ppx   - Lovenox     #Hx of HFpEF, PVD s/p L BKA, alcoholic liver cirrhosis, DT s/p trach now decannulated, HTN, VIJAY (noncompliant w/ CPAP), alcoholism, severe pulmonary HTN, recent acute CVA  - c/w home meds   - on DAPT for recent CVA  - PT consulted: [facility] lives [facility], hx of L BKA, family requesting wheelchair on d/c

## 2023-12-01 NOTE — DIETITIAN INITIAL EVALUATION ADULT - ADD RECOMMEND
1) C/w regular diet PLUS 1500mL fluid restriction  2) Add Arslan BID to promote wound healing 2/2 stage II PI; 500mg vitamin C 2x daily; 220mg Zinc Sulfate daily x 10 days  3) Encourage high protein foods, maximize food preferences   4) C/w daily MVI w/ min to meet 100% RDA  5) C/w 1mg folic acid daily 2/2 ETOH abuse  6) C/w 100mg thiamine daily 2/2 ETOH abuse   7) Consider obtaining serum vitamin D 25-OH to assess nutriture; c/w 1000U cholecalciferol daily, per MD  8) Monitor bowel movements; if no BM for >3 days consider implementing bowel regimen  9) C/w daily weights, I&Os, monitor hydration status and fluid intake  10) Confirm goals of care regarding nutrition support.  RD to monitor PO intake, hydration, labs, wt prn 1) C/w regular diet PLUS 1500mL fluid restriction  2) Add Arslan BID to promote wound healing 2/2 stage II PI; 500mg vitamin C 2x daily; 220mg Zinc Sulfate daily x 10 days  3) Encourage high protein foods, maximize food preferences   4) C/w daily MVI w/ min to meet 100% RDA  5) C/w 1mg folic acid daily 2/2 ETOH abuse  6) C/w 100mg thiamine daily 2/2 ETOH abuse   7) Consider obtaining serum vitamin D 25-OH to assess nutriture; c/w 1000U cholecalciferol daily, per MD  8) Monitor bowel movements; if no BM for >3 days consider implementing bowel regimen  9) C/w daily weights, I&Os, monitor hydration status and fluid intake  10) Confirm goals of care regarding nutrition support  11) monitor PO intake as pt is at high risk for PCM.   RD to monitor PO intake, hydration, labs, wt prn

## 2023-12-01 NOTE — PROGRESS NOTE ADULT - ASSESSMENT
68y Male PMH COPD on home O2, chronic A.fib s/p Watchman procedure, HFpEF, PVD, s/p L BKA, alcoholic cirrhosis, recent hospitalization for COPD ex and CVA       Admitted with:    Acute hypoxic resp failure   Influenza A pneumonia   Superimposed bacterial pneumonia - gram negative cannot be ruled out   Sepsis POA   A.fib with RVR         Plan:     Neuro - daily reorientation. Pain mx prn. D/c melatonin. Continue home meds - neurontin, buspar, seroquel. Continue aspirin, plavix, statin. PT, OOB     CV - BP stable. Remains in A.fib but HR controlled, off iv diltiazem, will continue po diltiazem. No AC due to previous hx bleeding, s/p Watchman procedure. Daily lasix, appears euvolemic    Resp - status stable without NIV. Continue spiriva, symbicort, prn albuterol. HOB elevation. Aspiration precautions    GI - PO diet. Avoid hepatotoxic meds. Monitor abdominal exam     Renal - monitor UO and renal fn. Trend and replete lytes prn. Avoid nephrotoxic meds. No blair    ID - on tamiflu, doxy, cefepime, BCx pending. ID f/u      Endo - maintain euglycemia     Heme - transfuse PRBC for Hb<7 unless active bleeding or HD unstable     DVT ppx with lovenox       Stable for tele 68y Male PMH COPD on home O2, chronic A.fib s/p Watchman procedure, HFpEF, PVD, s/p L BKA, alcoholic cirrhosis, recent hospitalization for COPD ex and CVA       Admitted with:    Acute hypoxic resp failure   Influenza A pneumonia   Superimposed bacterial pneumonia - gram negative cannot be ruled out   Sepsis POA   A.fib with RVR         Plan:     Neuro - daily reorientation. Pain mx prn. D/c melatonin. Continue home meds - neurontin, buspar, seroquel. Continue aspirin, plavix, statin. PT, OOB     CV - BP stable. Remains in A.fib but HR controlled, off iv diltiazem, will continue po diltiazem. No AC due to previous hx bleeding, s/p Watchman procedure. Daily lasix, appears euvolemic    Resp - status stable without NIV. Continue spiriva, symbicort, prn albuterol. HOB elevation. Aspiration precautions    GI - PO diet. Avoid hepatotoxic meds. Monitor abdominal exam     Renal - monitor UO and renal fn. Trend and replete lytes prn. Avoid nephrotoxic meds. No blair    ID - on tamiflu, doxy, cefepime, BCx pending. ID f/u      Endo - maintain euglycemia     Heme - transfuse PRBC for Hb<7 unless active bleeding or HD unstable     DVT ppx with lovenox     DNR, DNI. NIV ok       Stable for tele, sign out given to Medicine MERRILL Gordon

## 2023-12-01 NOTE — PROGRESS NOTE ADULT - PROBLEM SELECTOR PLAN 1
Permanent atrial fibrillation - severe tachycardia in ER upon presentation (likely related to respiratory distress in setting influenza A infection); now improved to HR 90s on diltiazem 180mg daily and metoprolol tartrate 25mg BID    Stable from cardiac standpoint. Will sign off please call back with any questions/concerns.

## 2023-12-01 NOTE — PROGRESS NOTE ADULT - SUBJECTIVE AND OBJECTIVE BOX
Date of service: 12-01-23 @ 12:07    pt seen and examined   has cough/congestion  no fevers   on nasal cannula     ROS: no fever or chills; denies dizziness, no HA, no abdominal pain, no diarrhea or constipation; no dysuria, no urinary frequency, no legs pain, no rashes    MEDICATIONS  (STANDING):  albuterol/ipratropium for Nebulization 3 milliLiter(s) Nebulizer every 6 hours  aspirin enteric coated 81 milliGRAM(s) Oral daily  atorvastatin 80 milliGRAM(s) Oral at bedtime  budesonide 160 MICROgram(s)/formoterol 4.5 MICROgram(s) Inhaler 2 Puff(s) Inhalation two times a day  busPIRone 10 milliGRAM(s) Oral two times a day  cefepime  Injectable. 2000 milliGRAM(s) IV Push every 8 hours  cholecalciferol 1000 Unit(s) Oral daily  clopidogrel Tablet 75 milliGRAM(s) Oral daily  cyanocobalamin 1000 MICROGram(s) Oral daily  dextrose 50% Injectable 12.5 Gram(s) IV Push once  dextrose 50% Injectable 25 Gram(s) IV Push once  dextrose 50% Injectable 25 Gram(s) IV Push once  dextrose Oral Gel 15 Gram(s) Oral once  diltiazem    milliGRAM(s) Oral daily  doxazosin 1 milliGRAM(s) Oral at bedtime  doxycycline IVPB 100 milliGRAM(s) IV Intermittent every 12 hours  doxycycline IVPB      enoxaparin Injectable 40 milliGRAM(s) SubCutaneous every 12 hours  folic acid 1 milliGRAM(s) Oral daily  furosemide    Tablet 40 milliGRAM(s) Oral daily  gabapentin 400 milliGRAM(s) Oral three times a day  glucagon  Injectable 1 milliGRAM(s) IntraMuscular once  influenza  Vaccine (HIGH DOSE) 0.7 milliLiter(s) IntraMuscular once  insulin lispro (ADMELOG) corrective regimen sliding scale   SubCutaneous at bedtime  insulin lispro (ADMELOG) corrective regimen sliding scale   SubCutaneous three times a day before meals  metoprolol tartrate 25 milliGRAM(s) Oral two times a day  multivitamin 1 Tablet(s) Oral daily  oseltamivir 75 milliGRAM(s) Oral two times a day  pantoprazole    Tablet 40 milliGRAM(s) Oral before breakfast  psyllium Powder 2 Packet(s) Oral daily  QUEtiapine 100 milliGRAM(s) Oral at bedtime  thiamine 100 milliGRAM(s) Oral daily  tiotropium 2.5 MICROgram(s) Inhaler 2 Puff(s) Inhalation daily    Vital Signs Last 24 Hrs  T(C): 36.1 (01 Dec 2023 06:08), Max: 36.2 (01 Dec 2023 00:28)  T(F): 97 (01 Dec 2023 06:08), Max: 97.2 (01 Dec 2023 00:28)  HR: 108 (01 Dec 2023 10:00) (71 - 979)  BP: 121/64 (01 Dec 2023 10:00) (85/75 - 121/98)  BP(mean): 82 (01 Dec 2023 10:00) (55 - 107)  RR: 34 (01 Dec 2023 10:00) (13 - 34)  SpO2: 97% (01 Dec 2023 10:00) (84% - 97%)    Parameters below as of 01 Dec 2023 09:04  Patient On (Oxygen Delivery Method): nasal cannula, 4lpm        PE:  Constitutional: NAD   HEENT: NC/AT, EOMI, PERRLA, conjunctivae clear; ears and nose atraumatic; pharynx benign  Neck: supple; thyroid not palpable  Back: no tenderness  Respiratory: decreased breath sounds, rhonchi   Cardiovascular: S1S2 regular, no murmurs  Abdomen: soft, not tender, not distended, positive BS; liver and spleen WNL  Genitourinary: no suprapubic tenderness  Lymphatic: no LN palpable  Musculoskeletal: no muscle tenderness, no joint swelling or tenderness  Extremities: no pedal edema  Neurological/ Psychiatric: AxOx3, Judgement and insight normal;  moving all extremities  Skin: no rashes; no palpable lesions    Labs: all available labs reviewed                                   9.9    17.68 )-----------( 147      ( 01 Dec 2023 05:32 )             31.3     12-01    137  |  102  |  31<H>  ----------------------------<  123<H>  4.2   |  29  |  0.95    Ca    8.4<L>      01 Dec 2023 05:32  Phos  3.6     12-01  Mg     1.7     12-01    TPro  6.0  /  Alb  2.4<L>  /  TBili  0.8  /  DBili  x   /  AST  12<L>  /  ALT  20  /  AlkPhos  60  12-01          Urinalysis Basic - ( 30 Nov 2023 08:18 )    Color: x / Appearance: x / SG: x / pH: x  Gluc: 94 mg/dL / Ketone: x  / Bili: x / Urobili: x   Blood: x / Protein: x / Nitrite: x   Leuk Esterase: x / RBC: x / WBC x   Sq Epi: x / Non Sq Epi: x / Bacteria: x          Radiology: all available radiological tests reviewed  < from: CT Chest No Cont (11.24.23 @ 11:36) >    ACC: [id number] EXAM:  CT CHEST   ORDERED BY: TAMMY NAVA     PROCEDURE DATE:  11/24/2023          INTERPRETATION:  EXAMINATION: CT CHEST    CLINICAL INDICATION: Respiratory failure.    TECHNIQUE: Noncontrast CT of the chest was obtained.    COMPARISON: Multiple CT, most recent 6/30/2023.    FINDINGS:    AIRWAYS AND LUNGS: Tracheal bronchial secretions.  Peripheral   reticulation with associated traction bronchiectasis and patchy opacity   that is increased from the prior study. No honeycombing.    MEDIASTINUM AND PLEURA: Mildly enlarged mediastinal lymph nodes. The   visualized portion of the thyroid gland is heterogeneous. There is no   pleural effusion. There is no pneumothorax.    HEART AND VESSELS: There is cardiomegaly.  There are atherosclerotic   calcifications of the aorta and coronary arteries.  There is no   pericardial effusion.  Left atrial appendage closure device.    UPPER ABDOMEN: Images of the upper abdomen demonstrate redemonstrated   splenic hypodensity..    BONES AND SOFT TISSUES: The bones are unremarkable.  The soft tissues are   unremarkable.    IMPRESSION:  Interstitial lung disease with increased groundglass opacity compared to   prior studies and may be infectious/inflammatory (including active   interstitial lung disease) versus fluid overload.        Advanced directives addressed: full resuscitation

## 2023-12-01 NOTE — DIETITIAN INITIAL EVALUATION ADULT - OTHER INFO
67 y/o male w/ PMHx: HFpEF, atrial fibrillation s/p Watchman (not anticoagulated 2/2 GI bleeding), mod mitral regurgitation, COPD on 4-5 L of home O2,  etOH-induced cirrhosis, delirium tremens s/p trach now decannulated, HTN, obesity, VIJAY (noncompliant w/ CPAP), alcoholism, severe pulmonary HTN, Left below knee amputation, PAD. BIBEMS to ED from [place] (11/30) < 24 hours after hospital d/c; c/o severe dyspnea after apparently sleeping without supplemental O2 the prior night. He was hospitalized from 11/17-11/29 with acute CVA associated with R  internal carotid artery stenosis + exacerbation of COPD (treated with prednisone and doxycycline). Thought his nasal cannula fell out of his nose last night and he awakened with great difficulty breathing. He was apparently found to be hypoxic, cyanotic, and in AF with rapid ventricular response when evaluated by EMS. Pt now DNR/DNI: trial NIV, noted per critical care progress note (12/1). Admit for acute hypoxic respiratory failure w/ hypoxia, influenza A, multifocal PNA, COPD exacerbation, Afib RVR.     Pt known to nutr services, dx with severe PCM 4/28/20; does NOT continue to meet criteria for PCM at this time, but at HIGH RISK 2/2 ETOH abuse and poor PO intake. Pt admitted multiple times over the last year 2/2 SOB. Pt d/c on 11/29 w/ DASH diet + 1500mL fluid restriction; output f/u with Dr. Perez 2/2 non occlusive R ICA stenosis. Endorses "great appetite"; did not consume breakfast at time of dietetic intern assessment, but reported ordering an egg sandwich, corn muffin, breakfast potatoes, and a V8. Bed scale wt ~295# obtained by RN (12/1/23), however pt w/ BKA; adjusted body wt ~277# (-6% body wt). Reports UBW of 283#, however BKA possibly skewing accuracy of wt hx. Endorses he gained ~10# during previous 12 day hospitalization (11/17-11/29). Wt gain possibly 2/2 increased PO intake and fluid retention 2/2 chronic CHF, though no edema noted per flow sheet (12/30). ? true wt changes at this time. NFPE reveals moderate temporal and triceps wasting - appears obese, weak, fatigued, w/ bruises on his arms. Reports he vomited last Friday (11/24) 2/2 mini stroke; no usual GI symptoms. Per EMR and past RD notes, pt previously on nutrition support: NGT initiated 4/28/20, PEG placed 5/15/20, SLP upgraded pt's diet to puree+honey thick diet 6/30/20 w/ nocturnal feeds via PEG. However, brown/orange secretions coming from trach after PO introduction >> pt made NPO w/ continuous TF, but pt pulling on tubes/lines >> placed on bolus feeds of Jevity 1.5 @ 400 mL 4x/day (TV daily = 1600 mL) w/ 1 packet of prosource TF daily (per RD note 7/1/20). ?when PEG was d/c and PO diet resumed. No hx of DM but currently on ISS: received 4 units x 24 hours. POCTs x 24 hours: 131, 147, 214 (H). C/w monitoring POCTs Q6 hours; goal: maintain 140-180 mg/dL. Pt on Lasix, received 40mg x 24 hours; K+ 4.2 (WNL). Currently on a regular diet. Recommend c/w regular diet and add 1500mL/d fluid restriction 2/2 chronic CHF. Pt w/ stage II PI. Dietetic intern discussed Arslan for wound healing - pt receptive to trial BID. See additional recommendations below. 67 y/o male w/ PMHx: HFpEF, atrial fibrillation s/p Watchman (not anticoagulated 2/2 GI bleeding), mod mitral regurgitation, COPD on 4-5 L of home O2,  etOH-induced cirrhosis, delirium tremens s/p trach now decannulated, HTN, obesity, VIJAY (noncompliant w/ CPAP), alcoholism, severe pulmonary HTN, Left below knee amputation, PAD. BIBEMS to ED from [place] (11/30) < 24 hours after hospital d/c; c/o severe dyspnea after apparently sleeping without supplemental O2 the prior night. He was hospitalized from 11/17-11/29 with acute CVA associated with R  internal carotid artery stenosis + exacerbation of COPD (treated with prednisone and doxycycline). Thought his nasal cannula fell out of his nose last night and he awakened with great difficulty breathing. He was apparently found to be hypoxic, cyanotic, and in AF with rapid ventricular response when evaluated by EMS. Pt now DNR/DNI: trial NIV, noted per critical care progress note (12/1). Tube feeding not addressed. Admit for acute hypoxic respiratory failure w/ hypoxia, influenza A, multifocal PNA, COPD exacerbation, Afib RVR.     Pt known to nutr services, dx with severe PCM 4/28/20; does NOT continue to meet criteria for PCM at this time, but at HIGH RISK 2/2 ETOH abuse and ?poor PO intake. Pt admitted multiple times over the last year 2/2 SOB. Pt d/c on 11/29 w/ DASH diet + 1500mL fluid restriction; output f/u with Dr. Perez 2/2 non occlusive R ICA stenosis. Endorses "great appetite"; did not consume breakfast at time of dietetic intern assessment, but reported ordering an egg sandwich, corn muffin, breakfast potatoes, and a V8. Bed scale wt ~295# obtained by RN (12/1/23), however pt w/ L BKA; adjusted body wt ~277# (-6% body wt). Reports UBW of 283#, endorsing this wt is 2/2 ~10#gain during previous 12 day hospitalization (11/17-11/29). Wt gain possibly 2/2 increased PO intake and fluid retention 2/2 chronic CHF, though no edema noted per flow sheet (12/30). ? true wt changes at this time. BKA possibly skewing accuracy of wt hx. NFPE reveals moderate temporal and triceps wasting - appears obese, weak, fatigued, w/ bruises on his arms. Reports he vomited last Friday (11/24) 2/2 mini stroke; no usual GI symptoms. Per EMR and past RD notes, pt previously on nutrition support: NGT initiated 4/28/20, PEG placed 5/15/20, SLP upgraded pt's diet to puree+honey thick diet 6/30/20 w/ nocturnal feeds via PEG. However, brown/orange secretions coming from trach after PO introduction >> pt made NPO w/ continuous TF, but pt pulling on tubes/lines >> placed on bolus feeds of Jevity 1.5 @ 400 mL 4x/day (TV daily = 1600 mL) w/ 1 packet of prosource TF daily (per RD note 7/1/20). ?when PEG was d/c and PO diet resumed. Pt could not remember when PEG was removed 2/2 poor historian, reporting it was "8 years ago." No hx of DM but currently on ISS: received 4 units x 24 hours. POCTs x 24 hours: 131, 147, 214 (H). C/w monitoring POCTs Q6 hours; goal: maintain 140-180 mg/dL. Pt on Lasix, received 40mg x 24 hours; K+ 4.2 (WNL). Currently on a regular diet. Recommend c/w regular diet and add 1500mL/d fluid restriction 2/2 chronic CHF. Pt w/ stage II PI; dietetic intern discussed Arslan for wound healing - pt receptive to trial BID. See additional recommendations below. 67 y/o male w/ PMHx: HFpEF, atrial fibrillation s/p Watchman (not anticoagulated 2/2 GI bleeding), mod mitral regurgitation, COPD on 4-5 L of home O2, ETOH-induced cirrhosis, delirium tremens s/p trach now decannulated, HTN, obesity, VIJAY (noncompliant w/ CPAP), alcoholism, severe pulmonary HTN, Left below knee amputation, PAD. BIBEMS to ED from [place] (11/30) < 24 hours after hospital d/c; c/o severe dyspnea after apparently sleeping without supplemental O2 the prior night. He was hospitalized from 11/17-11/29 with acute CVA associated with R  internal carotid artery stenosis + exacerbation of COPD (treated with prednisone and doxycycline). Thought his nasal cannula fell out of his nose last night and he awakened with great difficulty breathing. He was apparently found to be hypoxic, cyanotic, and in AF with rapid ventricular response when evaluated by EMS. Pt now DNR/DNI: trial NIV, noted per critical care progress note (12/1). Tube feeding not addressed in [facility]. Admit for acute hypoxic respiratory failure w/ hypoxia, influenza A, multifocal PNA, COPD exacerbation, Afib RVR.     Pt known to nutr services, dx with severe PCM 4/28/20. Per EMR and past RD notes, pt previously on nutrition support: NGT initiated 4/28/20, PEG placed 5/15/20, SLP upgraded pt's diet to puree+honey thick diet 6/30/20 w/ nocturnal feeds via PEG. However, brown/orange secretions coming from trach after PO introduction >> pt made NPO w/ continuous TF, but pt pulling on tubes/lines >> placed on bolus feeds of Jevity 1.5 @ 400 mL 4x/day (TV daily = 1600 mL) w/ 1 packet of prosource TF daily (per RD note 7/1/20). ?when PEG was d/c and PO diet resumed. Pt could not remember when PEG was removed 2/2 poor historian, reporting it was "8 years ago."  Pt does NOT continue to meet criteria for PCM at this time, but at HIGH RISK 2/2 ETOH abuse and ?poor PO intake. Pt admitted multiple times over the last year 2/2 SOB. Pt d/c on 11/29 w/ DASH diet + 1500mL fluid restriction; output f/u with Dr. Perez 2/2 non occlusive R ICA stenosis. Endorses "great appetite"; did not consume breakfast at time of dietetic intern assessment, but reported ordering an egg sandwich, corn muffin, breakfast potatoes, and a V8. Bed scale wt ~295# obtained by RN (12/1/23), however pt w/ L BKA; adjusted body wt ~313# (+6% body wt). Reports UBW of 283#, endorsing this wt is 2/2 ~10#gain during previous 12 day hospitalization (11/17-11/29). Wt gain possibly 2/2 increased PO intake and fluid retention 2/2 chronic CHF, though no edema noted per flow sheet (12/30). ? true wt changes at this time. NFPE reveals moderate temporal and triceps wasting only - appears obese, weak, fatigued, w/ noted bruises on his arms. Reports he vomited last Friday (11/24) 2/2 mini stroke; no usual GI symptoms. No hx of DM but currently on ISS: received 4 units x 24 hours. POCTs x 24 hours: 131, 147, 214 (H). C/w monitoring POCTs Q6 hours; goal: maintain 140-180 mg/dL. Pt on Lasix, received 40mg x 24 hours; K+ 4.2 (WNL). Currently on a regular diet. Recommend c/w regular diet and add 1500mL/d fluid restriction 2/2 chronic CHF. Pt w/ stage II PI; dietetic intern discussed Arslan for wound healing - pt receptive to trial BID. See additional recommendations below.

## 2023-12-01 NOTE — DIETITIAN INITIAL EVALUATION ADULT - NSFNSGIIOFT_GEN_A_CORE
I&O's Detail    30 Nov 2023 07:01  -  01 Dec 2023 07:00  --------------------------------------------------------  IN:    IV PiggyBack: 100 mL    Oral Fluid: 450 mL  Total IN: 550 mL    OUT:    Blood Loss (mL): 300 mL    Voided (mL): 2200 mL  Total OUT: 2500 mL    Total NET: -1950 mL

## 2023-12-01 NOTE — DIETITIAN INITIAL EVALUATION ADULT - PERTINENT MEDS FT
MEDICATIONS  (STANDING):  albuterol/ipratropium for Nebulization 3 milliLiter(s) Nebulizer every 6 hours  aspirin enteric coated 81 milliGRAM(s) Oral daily **may increase urinary loss of vitC  atorvastatin 80 milliGRAM(s) Oral at bedtime **avoid grapefruit   budesonide 160 MICROgram(s)/formoterol 4.5 MICROgram(s) Inhaler 2 Puff(s) Inhalation two times a day  busPIRone 10 milliGRAM(s) Oral two times a day  cefepime  Injectable. 2000 milliGRAM(s) IV Push every 8 hours  cholecalciferol 1000 Unit(s) Oral daily  clopidogrel Tablet 75 milliGRAM(s) Oral daily  cyanocobalamin 1000 MICROGram(s) Oral daily  dextrose 50% Injectable 12.5 Gram(s) IV Push once  dextrose 50% Injectable 25 Gram(s) IV Push once  dextrose 50% Injectable 25 Gram(s) IV Push once  dextrose Oral Gel 15 Gram(s) Oral once  diltiazem    milliGRAM(s) Oral daily  diltiazem Infusion 5 mG/Hr (5 mL/Hr) IV Continuous <Continuous>  doxazosin 1 milliGRAM(s) Oral at bedtime  doxycycline IVPB 100 milliGRAM(s) IV Intermittent every 12 hours  doxycycline IVPB      enoxaparin Injectable 40 milliGRAM(s) SubCutaneous every 12 hours  folic acid 1 milliGRAM(s) Oral daily  furosemide    Tablet 40 milliGRAM(s) Oral daily **depletes K+  gabapentin 400 milliGRAM(s) Oral three times a day  glucagon  Injectable 1 milliGRAM(s) IntraMuscular once  influenza  Vaccine (HIGH DOSE) 0.7 milliLiter(s) IntraMuscular once  insulin lispro (ADMELOG) corrective regimen sliding scale   SubCutaneous at bedtime  insulin lispro (ADMELOG) corrective regimen sliding scale   SubCutaneous three times a day before meals  melatonin 5 milliGRAM(s) Oral at bedtime **avoid high caffeine  metoprolol tartrate 25 milliGRAM(s) Oral two times a day  multivitamin 1 Tablet(s) Oral daily  oseltamivir 75 milliGRAM(s) Oral two times a day  pantoprazole    Tablet 40 milliGRAM(s) Oral before breakfast **may decrease activity of Bqw  psyllium Powder 2 Packet(s) Oral daily  QUEtiapine 100 milliGRAM(s) Oral at bedtime  thiamine 100 milliGRAM(s) Oral daily  tiotropium 2.5 MICROgram(s) Inhaler 2 Puff(s) Inhalation daily    MEDICATIONS  (PRN):  albuterol    90 MICROgram(s) HFA Inhaler 2 Puff(s) Inhalation every 4 hours PRN Shortness of Breath and/or Wheezing  meclizine 25 milliGRAM(s) Oral every 8 hours PRN Dizziness   MEDICATIONS  (STANDING):  albuterol/ipratropium for Nebulization 3 milliLiter(s) Nebulizer every 6 hours **may decrease K+, phos, Mg, Ca  aspirin enteric coated 81 milliGRAM(s) Oral daily **may increase urinary loss of vitC  atorvastatin 80 milliGRAM(s) Oral at bedtime **avoid grapefruit   budesonide 160 MICROgram(s)/formoterol 4.5 MICROgram(s) Inhaler 2 Puff(s) Inhalation two times a day **avoid grapefruit  busPIRone 10 milliGRAM(s) Oral two times a day **avoid grapefruit  cefepime  Injectable. 2000 milliGRAM(s) IV Push every 8 hours **can interfere with activity of folic acid, K+, B2, B6, B12, vit C & K  cholecalciferol 1000 Unit(s) Oral daily **may increase uptake of toxic elements (lead, arsenic, aluminum, cobalt, strontium)  clopidogrel Tablet 75 milliGRAM(s) Oral daily **avoid grapefruit  cyanocobalamin 1000 MICROGram(s) Oral daily  dextrose 50% Injectable 12.5 Gram(s) IV Push once  dextrose 50% Injectable 25 Gram(s) IV Push once  dextrose 50% Injectable 25 Gram(s) IV Push once  dextrose Oral Gel 15 Gram(s) Oral once  diltiazem    milliGRAM(s) Oral daily **avoid grapefruit  diltiazem Infusion 5 mG/Hr (5 mL/Hr) IV Continuous <Continuous>  doxazosin 1 milliGRAM(s) Oral at bedtime  doxycycline IVPB 100 milliGRAM(s) IV Intermittent every 12 hours  doxycycline IVPB      enoxaparin Injectable 40 milliGRAM(s) SubCutaneous every 12 hours **caution with vit K  folic acid 1 milliGRAM(s) Oral daily  furosemide    Tablet 40 milliGRAM(s) Oral daily **depletes K+  gabapentin 400 milliGRAM(s) Oral three times a day  glucagon  Injectable 1 milliGRAM(s) IntraMuscular once  influenza  Vaccine (HIGH DOSE) 0.7 milliLiter(s) IntraMuscular once  insulin lispro (ADMELOG) corrective regimen sliding scale   SubCutaneous at bedtime  insulin lispro (ADMELOG) corrective regimen sliding scale   SubCutaneous three times a day before meals  melatonin 5 milliGRAM(s) Oral at bedtime **avoid high caffeine  metoprolol tartrate 25 milliGRAM(s) Oral two times a day **caution with K+; take 2 hours apart from MVI  multivitamin 1 Tablet(s) Oral daily  oseltamivir 75 milliGRAM(s) Oral two times a day  pantoprazole    Tablet 40 milliGRAM(s) Oral before breakfast **may decrease activity of B12  psyllium Powder 2 Packet(s) Oral daily **may interfere with absorption of Fe, Ca, Zn, B12  QUEtiapine 100 milliGRAM(s) Oral at bedtime **avoid grapefruit  thiamine 100 milliGRAM(s) Oral daily  tiotropium 2.5 MICROgram(s) Inhaler 2 Puff(s) Inhalation daily    MEDICATIONS  (PRN):  albuterol    90 MICROgram(s) HFA Inhaler 2 Puff(s) Inhalation every 4 hours PRN Shortness of Breath and/or Wheezing **may decrease Ca, K+, phos, Mg  meclizine 25 milliGRAM(s) Oral every 8 hours PRN Dizziness **avoid ETOH

## 2023-12-01 NOTE — PROGRESS NOTE ADULT - ASSESSMENT
69 yo M PMHx multiple medical problems, including HFpEF, atrial fibrillation s/p Watchman (not anticoagulated due to GI bleeding), mod MR, COPD on 4-5 L of home O2,  etOH-induced cirrhosis, DT s/p trach now decannulated, HTN, obesity, VJIAY (noncompliant w/ CPAP), alcoholism, severe pulmonary HTN, Left BKA, PAD, who presented to the ED < 24 hours after hospital discharge with complaints of severe dyspnea after apparently sleeping without supplemental O2 last night.  He was hospitalized from 11/17-11/29 with acute CVA associated with R ICA stenosis + exacerbation of COPD (treated with prednisone and doxycycline). He thinks his nasal cannula fell out of his nose last night and he awakened with great difficulty breathing.  He was apparently found to be hypoxic, cyanotic, and in AF with RVR when evaluated by EMS. In the ED he was tachycardic to ~ 200 bpm and in respiratory distress; treated with BipAP and IV diltiazem. 11/30: On bipap, cardizem drip for rate control. + flu. Given IV cefepime/doxycycline for superimposed bacterial pna coverage.     1. Acute on chronic respiratory failure. Viral syndrome. Influenza. Superimposed bacterial pneumonia. COPD exacerbation.   - imaging reviewed  - on IV cefepime 2gmq8h #2  - on doxycycline 100mg BID #2   - f/u cultures, check sputum cx  - on tamiflu 75mg BID #2/5 day course  - pulmonary f/u   - isolation precautions  - tolerating abx well so far; no side effects noted  - reason for abx use and side effects reviewed with patient  - supportive care  - fu cbc    2. other issues - care per medicine

## 2023-12-01 NOTE — DIETITIAN INITIAL EVALUATION ADULT - NSFNSPHYEXAMSKINFT_GEN_A_CORE
Jitendra = 16  Pressure Injury 1: sacrum, Stage I  Pressure Injury 2: R 2nd and 3rd toe, Stage II Jitendra = 16  Pressure Injury 1: sacrum, Stage I  Pressure Injury 2: R 2nd and 3rd toe, Stage II    IBW = 136#  adjusted BW = 176#

## 2023-12-01 NOTE — PROGRESS NOTE ADULT - ASSESSMENT
68 year old male with a PMH of HFpEF, afib s/p Watchman procedure (not on AC due to GIB), mod MR, COPD on 4-5L home O2, ETOH induced cirrhosis, alcohol use disorder, HTN, obesity, VIJAY (noncompliant with COAP), severe pulmonary HTN, L BKA, PAD who presented to the ED <24 hours after hospital discharge with complaints of SOB.     Problem list:   Acute hypoxic respiratory failure   Flu A  Multifocal pna  COPD exacerbation   Afib RVR    Neuro: Avoid sedating or deliriogenic medications.  Tylenol given for complaints of neck/RLE pain.  Melatonin given for sleep.  C/w home Buspar and Seroquel   CVS: Now off Cardizem gtt and rate controlled. PO Cardizem daily.  Pulm: Duonebs q6 added for persistent wheezing. Patient saturating well on 4L NC.  NIPPV as needed.   GI: Regular diet.   Renal: Preserved renal function.  Monitor UOP.  Replete electrolytes prn for K?>4, Phos>3, Mg>2.   Heme: Lovenox for DVT prophylaxis.  DAPT with ASA and Plavix.   Endocrine: ISS.  Maintain blood glucose 110-180.  ID:  Tamiflu for influenza A.  Cefepime and doxycycline for abx coverage for possible superimposed bacterial infection.  Check sputum cx, urine legionella and strep pneumo. F/u blood cx.     Time spent on this patient encounter, which includes documenting this note in the electronic medical record, was 45 minutes including assessing the presenting problems with associated risks, reviewing the medical record to prepare for the encounter, and meeting face to face with patient to obtain additional history. I have also performed an appropriate physical exam, made interventions listed and ordered and interpreted appropriate diagnostic studies as documented. To improve communication and patient saftey, I have coordinated care with the multidisciplinary team including the bedside nurse, appropriate attending of record and consultants as needed.  Date of entry of this note is equal to the date of services rendered.    68 year old male with a PMH of HFpEF, afib s/p Watchman procedure (not on AC due to GIB), mod MR, COPD on 4-5L home O2, ETOH induced cirrhosis, alcohol use disorder, HTN, obesity, VIJAY (noncompliant with COAP), severe pulmonary HTN, L BKA, PAD who presented to the ED <24 hours after hospital discharge with complaints of SOB.     Problem list:   Acute hypoxic respiratory failure   Flu A  Multifocal pna  COPD exacerbation   Afib RVR    Neuro: Avoid sedating or deliriogenic medications.  Tylenol given for complaints of neck/RLE pain.  Melatonin given for sleep.  C/w home Buspar and Seroquel   CVS: Now off Cardizem gtt and rate controlled. PO Cardizem daily.  Pulm: Duonebs q6 added for persistent wheezing. Patient saturating well on 4L NC.  NIPPV if needed.   GI: Regular diet.   Renal: Preserved renal function.  Monitor UOP.  Replete electrolytes prn for K>4, Phos>3, Mg>2.   Heme: Lovenox for DVT prophylaxis.  DAPT with ASA and Plavix.   Endocrine: ISS.  Maintain blood glucose 110-180.  ID:  Tamiflu for influenza A.  Cefepime and doxycycline for abx coverage for possible superimposed bacterial infection.  Check sputum cx, urine legionella and strep pneumo. F/u blood cx.     Patient to remain in CCU. DNR/DNI.      Time spent on this patient encounter, which includes documenting this note in the electronic medical record, was 45 minutes including assessing the presenting problems with associated risks, reviewing the medical record to prepare for the encounter, and meeting face to face with patient to obtain additional history. I have also performed an appropriate physical exam, made interventions listed and ordered and interpreted appropriate diagnostic studies as documented. To improve communication and patient saftey, I have coordinated care with the multidisciplinary team including the bedside nurse, appropriate attending of record and consultants as needed.  Date of entry of this note is equal to the date of services rendered.

## 2023-12-01 NOTE — DIETITIAN INITIAL EVALUATION ADULT - NUTRITIONGOAL OUTCOME1
Pt will consume >/= 80% of all meals and supplements Pt will consume >/= 80% of all meals and supplements  Pt is at HIGH RISK for PCM

## 2023-12-01 NOTE — DIETITIAN INITIAL EVALUATION ADULT - SIGNS/SYMPTOMS
AEB low albumin (2.6), high lactate (2.4), high procalcitonin (10.10) AEB low albumin, high lactate, high procalcitonin, some moderate wasting (temporal/ tricep)

## 2023-12-01 NOTE — DIETITIAN INITIAL EVALUATION ADULT - FACTORS AFF FOOD INTAKE
poor historian, falling asleep during assessment;  "tiny acute infarct in the right frontal precentral gyrus" per EMR (11/18)/change in mental status/persistent lack of appetite

## 2023-12-01 NOTE — DIETITIAN INITIAL EVALUATION ADULT - ORAL INTAKE PTA/DIET HISTORY
Pt lives at [facility] [place], assisted living. Reports typically consuming 3 meals/day. Diet recall obtained: breakfast - eggs; lunch - ~2 cups soup; dinner - soup. Likely meeting <75% chronically, but ? accuracy of information 2/2 poor historian, per H&P. Per shadow chart, pt on a no salt added diet at [facility]. ETOH abuse and ETOH-induced cirrhosis noted per H&P. Pt lives at [facility] [place], assisted living. Reports typically consuming 3 meals/day. Diet recall obtained: breakfast - eggs; lunch - ~2 cups soup; dinner - soup, maybe some chicken or fish. Likely meeting <75% chronically, but ? accuracy of information 2/2 poor historian, per H&P. Per shadow chart, pt on a no salt added diet at [facility]. ETOH abuse and ETOH-induced cirrhosis noted per H&P.

## 2023-12-01 NOTE — DIETITIAN NUTRITION RISK NOTIFICATION - ADDITIONAL COMMENTS/DIETITIAN RECOMMENDATIONS
1) C/w regular diet PLUS 1500mL fluid restriction  2) Add Arslan BID to promote wound healing 2/2 stage II PI; 500mg vitamin C 2x daily; 220mg Zinc Sulfate daily x 10 days  3) Encourage high protein foods, maximize food preferences   4) C/w daily MVI w/ min to meet 100% RDA  5) C/w 1mg folic acid daily 2/2 ETOH abuse  6) C/w 100mg thiamine daily 2/2 ETOH abuse   7) Consider obtaining serum vitamin D 25-OH to assess nutriture; c/w 1000U cholecalciferol daily, per MD  8) Monitor bowel movements; if no BM for >3 days consider implementing bowel regimen  9) C/w daily weights, I&Os, monitor hydration status and fluid intake  10) Confirm goals of care regarding nutrition support  11) monitor PO intake as pt is at high risk for PCM.   RD to monitor PO intake, hydration, labs, wt prn

## 2023-12-01 NOTE — DIETITIAN INITIAL EVALUATION ADULT - NAME AND PHONE
Selena Schulte, Dietetic Intern Sleena Schulte, Dietetic Intern  Leigha Shaw (Formerly Carl), MS, RDN, [facility], [phone]  Certified Nutrition

## 2023-12-01 NOTE — PROGRESS NOTE ADULT - SUBJECTIVE AND OBJECTIVE BOX
Patient is a 68y old  Male who presents with a chief complaint of Acute respiratory failure with hypoxia     (01 Dec 2023 09:02)    24 hour events:     Allergies    Duricef (Rash)  Ceclor (Rash)    Intolerances      REVIEW OF SYSTEMS: SEE BELOW       ICU Vital Signs Last 24 Hrs  T(C): 36.1 (01 Dec 2023 06:08), Max: 36.2 (01 Dec 2023 00:28)  T(F): 97 (01 Dec 2023 06:08), Max: 97.2 (01 Dec 2023 00:28)  HR: 108 (01 Dec 2023 10:00) (71 - 979)  BP: 121/64 (01 Dec 2023 10:00) (85/75 - 121/98)  BP(mean): 82 (01 Dec 2023 10:00) (55 - 107)  ABP: --  ABP(mean): --  RR: 34 (01 Dec 2023 10:00) (13 - 34)  SpO2: 97% (01 Dec 2023 10:00) (84% - 97%)    O2 Parameters below as of 01 Dec 2023 09:04  Patient On (Oxygen Delivery Method): nasal cannula, 4lpm            CAPILLARY BLOOD GLUCOSE      POCT Blood Glucose.: 131 mg/dL (01 Dec 2023 07:34)  POCT Blood Glucose.: 147 mg/dL (30 Nov 2023 22:51)  POCT Blood Glucose.: 214 mg/dL (30 Nov 2023 17:20)      I&O's Summary    30 Nov 2023 07:01  -  01 Dec 2023 07:00  --------------------------------------------------------  IN: 550 mL / OUT: 2500 mL / NET: -1950 mL    01 Dec 2023 07:01  -  01 Dec 2023 12:07  --------------------------------------------------------  IN: 0 mL / OUT: 800 mL / NET: -800 mL            MEDICATIONS  (STANDING):  albuterol/ipratropium for Nebulization 3 milliLiter(s) Nebulizer every 6 hours  aspirin enteric coated 81 milliGRAM(s) Oral daily  atorvastatin 80 milliGRAM(s) Oral at bedtime  budesonide 160 MICROgram(s)/formoterol 4.5 MICROgram(s) Inhaler 2 Puff(s) Inhalation two times a day  busPIRone 10 milliGRAM(s) Oral two times a day  cefepime  Injectable. 2000 milliGRAM(s) IV Push every 8 hours  cholecalciferol 1000 Unit(s) Oral daily  clopidogrel Tablet 75 milliGRAM(s) Oral daily  cyanocobalamin 1000 MICROGram(s) Oral daily  dextrose 50% Injectable 12.5 Gram(s) IV Push once  dextrose 50% Injectable 25 Gram(s) IV Push once  dextrose 50% Injectable 25 Gram(s) IV Push once  dextrose Oral Gel 15 Gram(s) Oral once  diltiazem    milliGRAM(s) Oral daily  doxazosin 1 milliGRAM(s) Oral at bedtime  doxycycline IVPB      doxycycline IVPB 100 milliGRAM(s) IV Intermittent every 12 hours  enoxaparin Injectable 40 milliGRAM(s) SubCutaneous every 12 hours  folic acid 1 milliGRAM(s) Oral daily  furosemide    Tablet 40 milliGRAM(s) Oral daily  gabapentin 400 milliGRAM(s) Oral three times a day  glucagon  Injectable 1 milliGRAM(s) IntraMuscular once  influenza  Vaccine (HIGH DOSE) 0.7 milliLiter(s) IntraMuscular once  insulin lispro (ADMELOG) corrective regimen sliding scale   SubCutaneous at bedtime  insulin lispro (ADMELOG) corrective regimen sliding scale   SubCutaneous three times a day before meals  metoprolol tartrate 25 milliGRAM(s) Oral two times a day  multivitamin 1 Tablet(s) Oral daily  oseltamivir 75 milliGRAM(s) Oral two times a day  pantoprazole    Tablet 40 milliGRAM(s) Oral before breakfast  psyllium Powder 2 Packet(s) Oral daily  QUEtiapine 100 milliGRAM(s) Oral at bedtime  thiamine 100 milliGRAM(s) Oral daily  tiotropium 2.5 MICROgram(s) Inhaler 2 Puff(s) Inhalation daily      MEDICATIONS  (PRN):  albuterol    90 MICROgram(s) HFA Inhaler 2 Puff(s) Inhalation every 4 hours PRN Shortness of Breath and/or Wheezing  guaiFENesin Oral Liquid (Sugar-Free) 100 milliGRAM(s) Oral every 6 hours PRN Cough  meclizine 25 milliGRAM(s) Oral every 8 hours PRN Dizziness      PHYSICAL EXAM: SEE BELOW                          9.9    17.68 )-----------( 147      ( 01 Dec 2023 05:32 )             31.3       12-01    137  |  102  |  31<H>  ----------------------------<  123<H>  4.2   |  29  |  0.95    Ca    8.4<L>      01 Dec 2023 05:32  Phos  3.6     12-01  Mg     1.7     12-01    TPro  6.0  /  Alb  2.4<L>  /  TBili  0.8  /  DBili  x   /  AST  12<L>  /  ALT  20  /  AlkPhos  60  12-01          PT/INR - ( 30 Nov 2023 12:37 )   PT: 13.0 sec;   INR: 1.16 ratio         PTT - ( 30 Nov 2023 12:37 )  PTT:24.6 sec  Urinalysis Basic - ( 01 Dec 2023 05:32 )    Color: x / Appearance: x / SG: x / pH: x  Gluc: 123 mg/dL / Ketone: x  / Bili: x / Urobili: x   Blood: x / Protein: x / Nitrite: x   Leuk Esterase: x / RBC: x / WBC x   Sq Epi: x / Non Sq Epi: x / Bacteria: x

## 2023-12-01 NOTE — DIETITIAN INITIAL EVALUATION ADULT - PERTINENT LABORATORY DATA
12-01    137  |  102  |  31<H>  ----------------------------<  123<H>  4.2   |  29  |  0.95    Ca    8.4<L>      01 Dec 2023 05:32  Phos  3.6     12-01  Mg     1.7     12-01    TPro  6.0  /  Alb  2.4<L>  /  TBili  0.8  /  DBili  x   /  AST  12<L>  /  ALT  20  /  AlkPhos  60  12-01  POCT Blood Glucose.: 131 mg/dL (12-01-23 @ 07:34)  A1C with Estimated Average Glucose Result: 5.6 % (11-21-23 @ 06:40)  A1C with Estimated Average Glucose Result: 5.9 % (07-09-23 @ 07:07)  A1C with Estimated Average Glucose Result: 6.0 % (05-28-23 @ 06:51)   12-01    137  |  102  |  31<H>  ----------------------------<  123<H>  4.2   |  29  |  0.95    Ca    8.4<L>      01 Dec 2023 05:32  Phos  3.6     12-01  Mg     1.7     12-01    TPro  6.0  /  Alb  2.4<L>  /  TBili  0.8  /  DBili  x   /  AST  12<L>  /  ALT  20  /  AlkPhos  60  12-01  POCT Blood Glucose.: 131 mg/dL (12-01-23 @ 07:34)  A1C with Estimated Average Glucose Result: 5.6 % (11-21-23 @ 06:40)  A1C with Estimated Average Glucose Result: 5.9 % (07-09-23 @ 07:07)  A1C with Estimated Average Glucose Result: 6.0 % (05-28-23 @ 06:51)    **high WBC likely 2/2 infection  **low H&H, RBC 2/2 h/o anemia   **high POCT likely 2/2 stress  **high BUN likely 2/2 dehydration  **low albumin 2/2 stress/inflammation/infection  **low AST likely 2/2 liver dysfunction 2/2 ETOH abuse   **high lactate likely 2/2 hypoxia  **high procalcitonin likely 2/2 infection  **high pBNP 2/2 CHF

## 2023-12-01 NOTE — PROGRESS NOTE ADULT - SUBJECTIVE AND OBJECTIVE BOX
CHIEF COMPLAINT: Patient is a 68y old  Male who presents with a chief complaint of difficulty breathing.    HPI: Chronically-ill 68 year old man with multiple medical problems, including HFpEF, atrial fibrillation s/p Watchman (not anticoagulated due to GI bleeding), mod MR, COPD on 4-5 L of home O2,  etOH-induced cirrhosis, DT s/p trach now decannulated, HTN, obesity, VIJAY (noncompliant w/ CPAP), alcoholism, severe pulmonary HTN, Left BKA, PAD, who presented to the ED < 24 hours after hospital discharge with complaints of severe dyspnea after apparently sleeping without supplemental O2 last night.  He was hospitalized from 11/17-11/29 with acute CVA associated with R ICA stenosis + exacerbation of COPD (treated with prednisone and doxycycline). He thinks his nasal cannula fell out of his nose last night and he awakened with great difficulty breathing.  He was apparently found to be hypoxic, cyanotic, and in AF with RVR when evaluated by EMS. In the ED he was tachycardic to ~ 200 bpm and in respiratory distress; treated with BipAP and IV diltiazem.    12/1/23: feeling improved today aside from fatigue.   HR better controlled today (Afib 90-100s)      PAST MEDICAL & SURGICAL HISTORY:  Chronic atrial fibrillation  Alcohol abuse  Poor historian  CHF (congestive heart failure)  Cirrhosis  Emphysema of lung  Alcohol withdrawal  Collapsed lung  Meningitis  Falls  Anemia  Chronic obstructive pulmonary disease (COPD)  GERD (gastroesophageal reflux disease)  Hypertension  Presence of Watchman left atrial appendage closure device  S/P BKA (below knee amputation) unilateral, left  H/O tracheostomy now removed  S/P percutaneous endoscopic gastrostomy (PEG) tube placement now removed    SOCIAL HISTORY:   Alcohol: History of alcoholism now not drinking  Smoking: Former smoker  Resident of assisted living facility    FAMILY HISTORY: No pertinent family history in first degree relatives    MEDICATIONS  (STANDING):  cefepime   IVPB 1000 milliGRAM(s) IV Intermittent once  diltiazem Infusion 5 mG/Hr (5 mL/Hr) IV Continuous <Continuous>  vancomycin  IVPB. 1000 milliGRAM(s) IV Intermittent once    Allergies:  Duricef (Rash)  Ceclor (Rash)      VITAL SIGNS:   Vital Signs Last 24 Hrs  T(C): 36.9 (30 Nov 2023 08:18), Max: 36.9 (30 Nov 2023 08:18)  T(F): 98.5 (30 Nov 2023 08:18), Max: 98.5 (30 Nov 2023 08:18)  HR: 154 (30 Nov 2023 08:20) (78 - 169)  BP: 113/91 (30 Nov 2023 08:18) (113/91 - 113/91)  BP(mean): 97 (30 Nov 2023 08:18) (97 - 97)  RR: 30 (30 Nov 2023 08:18) (30 - 31)  SpO2: 95% (30 Nov 2023 08:20) (95% - 98%)  Patient On (Oxygen Delivery Method): mask, nonrebreather    PHYSICAL EXAM:  Constitutional: NAD, awake and alert, obese  HEENT:  EOMI  Pulmonary: on NC breathing comfortable, scattered rhonchi   Cardiovascular: Irregularly irregular  Gastrointestinal: Bowel Sounds present, soft, nontender.   Lymph: Mild LE edema. No cervical lymphadenopathy.  Neurological: Alert, no focal deficits  Skin: No rashes.  Psych:  Mood & affect appropriate    LABS:                   12.8   14.81 )-----------( 200      ( 30 Nov 2023 08:18 )             40.2     Tele:  AF w/ RVR    CT Chest No Cont (11.24.23 @ 11:36):  Interstitial lung disease with increased groundglass opacity compared to prior studies and may be infectious/inflammatory (including active interstitial lung disease) versus fluid overload.    MYA Complete w/Spect and Color (11.20.23 @ 15:07):   Normal left ventricular size and systolic function. Estimated LVEF is 60-65%.  Left atrial occlusion device present with small jose-device leak. No evidence of jose-device thrombus. Moderate mitral and tricuspid regurgitation.

## 2023-12-01 NOTE — PROGRESS NOTE ADULT - PROBLEM SELECTOR PLAN 2
Respiratory failure is probably multifactorial and related to COPD (including recent exacerbation) and influenza A infection. on baseline home O2  Cont lasix PO daily   resp management per critical care team (NC and Nocturnal bipap currently)

## 2023-12-01 NOTE — PROGRESS NOTE ADULT - SUBJECTIVE AND OBJECTIVE BOX
Patient is a 68y old  Male who presents with a chief complaint of Resp failure (30 Nov 2023 14:03)      BRIEF HOSPITAL COURSE: 68 year old male with a PMH of HFpEF, afib s/p Watchman procedure (not on AC due to GIB), mod MR, COPD on 4-5L home O2, ETOH induced cirrhosis, alcohol use disorder, HTN, obesity, VIJAY (noncompliant with COAP), severe pulmonary HTN, L BKA, PAD who presented to the ED <24 hours after hospital discharge with complaints of SOB.  The patient was discharged from this hospital on 11/29 after an admission for vertigo.  He was found to have an acute infarct in the R frontal precentral gyrus.  He was dc on ASA, Plavix, and high intensity statin. He was also found to have GGO on CT chest and was discharged on PO prednisone taper.  He was discharged on doxycycline for COPD exacerbation.  Upon patient's return to the hospital he was found to be hypoxic and in afib with RVR.  He was placed in NIPPV and a cardizem gtt and admitted to CCU for further management.  Found to be flu+.     Events last 24 hours: Patient now off cardizem gtt and rate controlled.  Intermittently on NIPPV overnight but mostly on NC O2.  Nebs ordered for wheezing on exam.     PAST MEDICAL & SURGICAL HISTORY:  Chronic atrial fibrillation  Alcohol abuse  Poor historian  CHF (congestive heart failure)  Cirrhosis  Emphysema of lung  Alcohol withdrawal  Collapsed lung  Meningitis  Falls  Anemia  Chronic obstructive pulmonary disease (COPD)  GERD (gastroesophageal reflux disease)  Hypertension  Presence of Watchman left atrial appendage closure device  Atrial fibrillation  COPD without exacerbation  Chronic CHF  S/P BKA (below knee amputation) unilateral, left  Presence of Watchman left atrial appendage closure device  Unilateral amputation of lower extremity below knee  H/O tracheostomy  now removed  S/P percutaneous endoscopic gastrostomy (PEG) tube placement  now removed      Medications:  cefepime  Injectable. 2000 milliGRAM(s) IV Push every 8 hours  doxycycline IVPB 100 milliGRAM(s) IV Intermittent every 12 hours  doxycycline IVPB      oseltamivir 75 milliGRAM(s) Oral two times a day    diltiazem    milliGRAM(s) Oral daily  diltiazem Infusion 5 mG/Hr IV Continuous <Continuous>  doxazosin 1 milliGRAM(s) Oral at bedtime  furosemide    Tablet 40 milliGRAM(s) Oral daily  metoprolol tartrate 25 milliGRAM(s) Oral two times a day    albuterol    90 MICROgram(s) HFA Inhaler 2 Puff(s) Inhalation every 4 hours PRN  albuterol/ipratropium for Nebulization 3 milliLiter(s) Nebulizer every 6 hours  budesonide 160 MICROgram(s)/formoterol 4.5 MICROgram(s) Inhaler 2 Puff(s) Inhalation two times a day  tiotropium 2.5 MICROgram(s) Inhaler 2 Puff(s) Inhalation daily    busPIRone 10 milliGRAM(s) Oral two times a day  gabapentin 400 milliGRAM(s) Oral three times a day  meclizine 25 milliGRAM(s) Oral every 8 hours PRN  melatonin 5 milliGRAM(s) Oral at bedtime  QUEtiapine 100 milliGRAM(s) Oral at bedtime      aspirin enteric coated 81 milliGRAM(s) Oral daily  clopidogrel Tablet 75 milliGRAM(s) Oral daily  enoxaparin Injectable 40 milliGRAM(s) SubCutaneous every 12 hours    pantoprazole    Tablet 40 milliGRAM(s) Oral before breakfast  psyllium Powder 2 Packet(s) Oral daily      atorvastatin 80 milliGRAM(s) Oral at bedtime  dextrose 50% Injectable 12.5 Gram(s) IV Push once  dextrose 50% Injectable 25 Gram(s) IV Push once  dextrose 50% Injectable 25 Gram(s) IV Push once  dextrose Oral Gel 15 Gram(s) Oral once  glucagon  Injectable 1 milliGRAM(s) IntraMuscular once  insulin lispro (ADMELOG) corrective regimen sliding scale   SubCutaneous at bedtime  insulin lispro (ADMELOG) corrective regimen sliding scale   SubCutaneous three times a day before meals    cholecalciferol 1000 Unit(s) Oral daily  cyanocobalamin 1000 MICROGram(s) Oral daily  folic acid 1 milliGRAM(s) Oral daily  multivitamin 1 Tablet(s) Oral daily  thiamine 100 milliGRAM(s) Oral daily    influenza  Vaccine (HIGH DOSE) 0.7 milliLiter(s) IntraMuscular once      ICU Vital Signs Last 24 Hrs  T(C): 36.1 (01 Dec 2023 06:08), Max: 36.9 (30 Nov 2023 08:18)  T(F): 97 (01 Dec 2023 06:08), Max: 98.5 (30 Nov 2023 08:18)  HR: 73 (01 Dec 2023 05:00) (71 - 169)  BP: 85/75 (01 Dec 2023 05:00) (85/61 - 121/98)  BP(mean): 80 (01 Dec 2023 05:00) (55 - 107)  ABP: --  ABP(mean): --  RR: 20 (01 Dec 2023 05:00) (13 - 34)  SpO2: 97% (01 Dec 2023 05:00) (84% - 98%)    O2 Parameters below as of 30 Nov 2023 21:14  Patient On (Oxygen Delivery Method): nasal cannula, 4L                I&O's Detail    30 Nov 2023 07:01  -  01 Dec 2023 06:32  --------------------------------------------------------  IN:    IV PiggyBack: 100 mL    Oral Fluid: 450 mL  Total IN: 550 mL    OUT:    Blood Loss (mL): 300 mL    Voided (mL): 2200 mL  Total OUT: 2500 mL    Total NET: -1950 mL            LABS:                        9.9    17.68 )-----------( 147      ( 01 Dec 2023 05:32 )             31.3     12-01    137  |  102  |  31<H>  ----------------------------<  123<H>  4.2   |  29  |  0.95    Ca    8.4<L>      01 Dec 2023 05:32  Phos  3.6     12-01  Mg     1.7     12-01    TPro  6.0  /  Alb  2.4<L>  /  TBili  0.8  /  DBili  x   /  AST  12<L>  /  ALT  20  /  AlkPhos  60  12-01          CAPILLARY BLOOD GLUCOSE      POCT Blood Glucose.: 147 mg/dL (30 Nov 2023 22:51)    PT/INR - ( 30 Nov 2023 12:37 )   PT: 13.0 sec;   INR: 1.16 ratio         PTT - ( 30 Nov 2023 12:37 )  PTT:24.6 sec  Urinalysis Basic - ( 01 Dec 2023 05:32 )    Color: x / Appearance: x / SG: x / pH: x  Gluc: 123 mg/dL / Ketone: x  / Bili: x / Urobili: x   Blood: x / Protein: x / Nitrite: x   Leuk Esterase: x / RBC: x / WBC x   Sq Epi: x / Non Sq Epi: x / Bacteria: x      CULTURES:      Physical Examination:    General: Obese ill appearing male     PULM: +diffuse wheeze    NECK: Supple, no lymphadenopathy, trachea midline    CVS: Regular rate, irregular rhythm     ABD: Soft, nondistended, nontender    EXT: LLE BKA     SKIN: Warm and well perfused, no rashes noted.    NEURO: Alert, oriented, interactive, nonfocal

## 2023-12-02 LAB
ANION GAP SERPL CALC-SCNC: 4 MMOL/L — LOW (ref 5–17)
ANION GAP SERPL CALC-SCNC: 4 MMOL/L — LOW (ref 5–17)
BUN SERPL-MCNC: 27 MG/DL — HIGH (ref 7–23)
BUN SERPL-MCNC: 27 MG/DL — HIGH (ref 7–23)
CALCIUM SERPL-MCNC: 8.4 MG/DL — LOW (ref 8.5–10.1)
CALCIUM SERPL-MCNC: 8.4 MG/DL — LOW (ref 8.5–10.1)
CHLORIDE SERPL-SCNC: 102 MMOL/L — SIGNIFICANT CHANGE UP (ref 96–108)
CHLORIDE SERPL-SCNC: 102 MMOL/L — SIGNIFICANT CHANGE UP (ref 96–108)
CO2 SERPL-SCNC: 30 MMOL/L — SIGNIFICANT CHANGE UP (ref 22–31)
CO2 SERPL-SCNC: 30 MMOL/L — SIGNIFICANT CHANGE UP (ref 22–31)
CREAT SERPL-MCNC: 0.83 MG/DL — SIGNIFICANT CHANGE UP (ref 0.5–1.3)
CREAT SERPL-MCNC: 0.83 MG/DL — SIGNIFICANT CHANGE UP (ref 0.5–1.3)
EGFR: 97 ML/MIN/1.73M2 — SIGNIFICANT CHANGE UP
EGFR: 97 ML/MIN/1.73M2 — SIGNIFICANT CHANGE UP
FERRITIN SERPL-MCNC: 214 NG/ML — SIGNIFICANT CHANGE UP (ref 30–400)
FERRITIN SERPL-MCNC: 214 NG/ML — SIGNIFICANT CHANGE UP (ref 30–400)
GLUCOSE BLDC GLUCOMTR-MCNC: 110 MG/DL — HIGH (ref 70–99)
GLUCOSE BLDC GLUCOMTR-MCNC: 110 MG/DL — HIGH (ref 70–99)
GLUCOSE BLDC GLUCOMTR-MCNC: 128 MG/DL — HIGH (ref 70–99)
GLUCOSE BLDC GLUCOMTR-MCNC: 128 MG/DL — HIGH (ref 70–99)
GLUCOSE BLDC GLUCOMTR-MCNC: 135 MG/DL — HIGH (ref 70–99)
GLUCOSE BLDC GLUCOMTR-MCNC: 135 MG/DL — HIGH (ref 70–99)
GLUCOSE SERPL-MCNC: 92 MG/DL — SIGNIFICANT CHANGE UP (ref 70–99)
GLUCOSE SERPL-MCNC: 92 MG/DL — SIGNIFICANT CHANGE UP (ref 70–99)
HCT VFR BLD CALC: 32.6 % — LOW (ref 39–50)
HCT VFR BLD CALC: 32.6 % — LOW (ref 39–50)
HGB BLD-MCNC: 10.1 G/DL — LOW (ref 13–17)
HGB BLD-MCNC: 10.1 G/DL — LOW (ref 13–17)
IRON SATN MFR SERPL: 20 UG/DL — LOW (ref 45–165)
IRON SATN MFR SERPL: 20 UG/DL — LOW (ref 45–165)
IRON SATN MFR SERPL: 6 % — LOW (ref 16–55)
IRON SATN MFR SERPL: 6 % — LOW (ref 16–55)
LEGIONELLA AG UR QL: NEGATIVE — SIGNIFICANT CHANGE UP
LEGIONELLA AG UR QL: NEGATIVE — SIGNIFICANT CHANGE UP
MAGNESIUM SERPL-MCNC: 1.9 MG/DL — SIGNIFICANT CHANGE UP (ref 1.6–2.6)
MAGNESIUM SERPL-MCNC: 1.9 MG/DL — SIGNIFICANT CHANGE UP (ref 1.6–2.6)
MCHC RBC-ENTMCNC: 27.7 PG — SIGNIFICANT CHANGE UP (ref 27–34)
MCHC RBC-ENTMCNC: 27.7 PG — SIGNIFICANT CHANGE UP (ref 27–34)
MCHC RBC-ENTMCNC: 31 GM/DL — LOW (ref 32–36)
MCHC RBC-ENTMCNC: 31 GM/DL — LOW (ref 32–36)
MCV RBC AUTO: 89.6 FL — SIGNIFICANT CHANGE UP (ref 80–100)
MCV RBC AUTO: 89.6 FL — SIGNIFICANT CHANGE UP (ref 80–100)
PHOSPHATE SERPL-MCNC: 2.6 MG/DL — SIGNIFICANT CHANGE UP (ref 2.5–4.5)
PHOSPHATE SERPL-MCNC: 2.6 MG/DL — SIGNIFICANT CHANGE UP (ref 2.5–4.5)
PLATELET # BLD AUTO: 138 K/UL — LOW (ref 150–400)
PLATELET # BLD AUTO: 138 K/UL — LOW (ref 150–400)
POTASSIUM SERPL-MCNC: 4.3 MMOL/L — SIGNIFICANT CHANGE UP (ref 3.5–5.3)
POTASSIUM SERPL-MCNC: 4.3 MMOL/L — SIGNIFICANT CHANGE UP (ref 3.5–5.3)
POTASSIUM SERPL-SCNC: 4.3 MMOL/L — SIGNIFICANT CHANGE UP (ref 3.5–5.3)
POTASSIUM SERPL-SCNC: 4.3 MMOL/L — SIGNIFICANT CHANGE UP (ref 3.5–5.3)
RBC # BLD: 3.64 M/UL — LOW (ref 4.2–5.8)
RBC # BLD: 3.64 M/UL — LOW (ref 4.2–5.8)
RBC # FLD: 16.1 % — HIGH (ref 10.3–14.5)
RBC # FLD: 16.1 % — HIGH (ref 10.3–14.5)
S PNEUM AG UR QL: NEGATIVE — SIGNIFICANT CHANGE UP
S PNEUM AG UR QL: NEGATIVE — SIGNIFICANT CHANGE UP
SODIUM SERPL-SCNC: 136 MMOL/L — SIGNIFICANT CHANGE UP (ref 135–145)
SODIUM SERPL-SCNC: 136 MMOL/L — SIGNIFICANT CHANGE UP (ref 135–145)
TIBC SERPL-MCNC: 348 UG/DL — SIGNIFICANT CHANGE UP (ref 220–430)
TIBC SERPL-MCNC: 348 UG/DL — SIGNIFICANT CHANGE UP (ref 220–430)
UIBC SERPL-MCNC: 328 UG/DL — SIGNIFICANT CHANGE UP (ref 110–370)
UIBC SERPL-MCNC: 328 UG/DL — SIGNIFICANT CHANGE UP (ref 110–370)
WBC # BLD: 12.38 K/UL — HIGH (ref 3.8–10.5)
WBC # BLD: 12.38 K/UL — HIGH (ref 3.8–10.5)
WBC # FLD AUTO: 12.38 K/UL — HIGH (ref 3.8–10.5)
WBC # FLD AUTO: 12.38 K/UL — HIGH (ref 3.8–10.5)

## 2023-12-02 PROCEDURE — 99232 SBSQ HOSP IP/OBS MODERATE 35: CPT

## 2023-12-02 RX ADMIN — Medication 25 MILLIGRAM(S): at 08:28

## 2023-12-02 RX ADMIN — Medication 100 MILLIGRAM(S): at 22:10

## 2023-12-02 RX ADMIN — PANTOPRAZOLE SODIUM 40 MILLIGRAM(S): 20 TABLET, DELAYED RELEASE ORAL at 06:31

## 2023-12-02 RX ADMIN — GABAPENTIN 400 MILLIGRAM(S): 400 CAPSULE ORAL at 06:30

## 2023-12-02 RX ADMIN — CLOPIDOGREL BISULFATE 75 MILLIGRAM(S): 75 TABLET, FILM COATED ORAL at 08:28

## 2023-12-02 RX ADMIN — ALBUTEROL 2 PUFF(S): 90 AEROSOL, METERED ORAL at 21:17

## 2023-12-02 RX ADMIN — Medication 81 MILLIGRAM(S): at 08:27

## 2023-12-02 RX ADMIN — Medication 100 MILLIGRAM(S): at 06:32

## 2023-12-02 RX ADMIN — Medication 1 TABLET(S): at 08:29

## 2023-12-02 RX ADMIN — Medication 25 MILLIGRAM(S): at 21:59

## 2023-12-02 RX ADMIN — Medication 75 MILLIGRAM(S): at 08:31

## 2023-12-02 RX ADMIN — Medication 75 MILLIGRAM(S): at 21:59

## 2023-12-02 RX ADMIN — ALBUTEROL 2 PUFF(S): 90 AEROSOL, METERED ORAL at 08:15

## 2023-12-02 RX ADMIN — Medication 1 MILLIGRAM(S): at 08:26

## 2023-12-02 RX ADMIN — ALBUTEROL 2 PUFF(S): 90 AEROSOL, METERED ORAL at 02:08

## 2023-12-02 RX ADMIN — BUDESONIDE AND FORMOTEROL FUMARATE DIHYDRATE 2 PUFF(S): 160; 4.5 AEROSOL RESPIRATORY (INHALATION) at 08:16

## 2023-12-02 RX ADMIN — ATORVASTATIN CALCIUM 80 MILLIGRAM(S): 80 TABLET, FILM COATED ORAL at 21:59

## 2023-12-02 RX ADMIN — Medication 100 MILLIGRAM(S): at 08:31

## 2023-12-02 RX ADMIN — Medication 2 PACKET(S): at 08:30

## 2023-12-02 RX ADMIN — CEFEPIME 2000 MILLIGRAM(S): 1 INJECTION, POWDER, FOR SOLUTION INTRAMUSCULAR; INTRAVENOUS at 13:34

## 2023-12-02 RX ADMIN — Medication 1000 UNIT(S): at 08:27

## 2023-12-02 RX ADMIN — Medication 180 MILLIGRAM(S): at 08:27

## 2023-12-02 RX ADMIN — CEFEPIME 2000 MILLIGRAM(S): 1 INJECTION, POWDER, FOR SOLUTION INTRAMUSCULAR; INTRAVENOUS at 22:00

## 2023-12-02 RX ADMIN — Medication 40 MILLIGRAM(S): at 08:31

## 2023-12-02 RX ADMIN — TIOTROPIUM BROMIDE 2 PUFF(S): 18 CAPSULE ORAL; RESPIRATORY (INHALATION) at 08:15

## 2023-12-02 RX ADMIN — ENOXAPARIN SODIUM 40 MILLIGRAM(S): 100 INJECTION SUBCUTANEOUS at 08:28

## 2023-12-02 RX ADMIN — ENOXAPARIN SODIUM 40 MILLIGRAM(S): 100 INJECTION SUBCUTANEOUS at 21:59

## 2023-12-02 RX ADMIN — GABAPENTIN 400 MILLIGRAM(S): 400 CAPSULE ORAL at 21:59

## 2023-12-02 RX ADMIN — Medication 10 MILLIGRAM(S): at 08:29

## 2023-12-02 RX ADMIN — Medication 110 MILLIGRAM(S): at 06:30

## 2023-12-02 RX ADMIN — Medication 1 MILLIGRAM(S): at 21:59

## 2023-12-02 RX ADMIN — CEFEPIME 2000 MILLIGRAM(S): 1 INJECTION, POWDER, FOR SOLUTION INTRAMUSCULAR; INTRAVENOUS at 06:31

## 2023-12-02 RX ADMIN — Medication 110 MILLIGRAM(S): at 17:29

## 2023-12-02 RX ADMIN — GABAPENTIN 400 MILLIGRAM(S): 400 CAPSULE ORAL at 13:36

## 2023-12-02 RX ADMIN — Medication 10 MILLIGRAM(S): at 21:59

## 2023-12-02 RX ADMIN — PREGABALIN 1000 MICROGRAM(S): 225 CAPSULE ORAL at 08:29

## 2023-12-02 RX ADMIN — QUETIAPINE FUMARATE 100 MILLIGRAM(S): 200 TABLET, FILM COATED ORAL at 22:00

## 2023-12-02 RX ADMIN — BUDESONIDE AND FORMOTEROL FUMARATE DIHYDRATE 2 PUFF(S): 160; 4.5 AEROSOL RESPIRATORY (INHALATION) at 21:17

## 2023-12-02 NOTE — PHYSICAL THERAPY INITIAL EVALUATION ADULT - GENERAL OBSERVATIONS, REHAB EVAL
Pt. received in isolation room on bed + O2 5 l nc + tele has L prosthetic leg at BS. Pt. agreeable to BS assessment but refused OOB.

## 2023-12-02 NOTE — PROGRESS NOTE ADULT - ASSESSMENT
69 yo M PMHx multiple medical problems, including HFpEF, atrial fibrillation s/p Watchman (not anticoagulated due to GI bleeding), mod MR, COPD on 4-5 L of home O2,  etOH-induced cirrhosis, DT s/p trach now decannulated, HTN, obesity, VIJAY (noncompliant w/ CPAP), alcoholism, severe pulmonary HTN, Left BKA, PAD, who presented to the ED < 24 hours after hospital discharge with complaints of severe dyspnea after apparently sleeping without supplemental O2 last night.  He was hospitalized from 11/17-11/29 with acute CVA associated with R ICA stenosis + exacerbation of COPD (treated with prednisone and doxycycline). He thinks his nasal cannula fell out of his nose last night and he awakened with great difficulty breathing.  He was apparently found to be hypoxic, cyanotic, and in AF with RVR when evaluated by EMS. In the ED he was tachycardic to ~ 200 bpm and in respiratory distress; treated with BipAP and IV diltiazem. 11/30: On bipap, cardizem drip for rate control. + flu. Given IV cefepime/doxycycline for superimposed bacterial pna coverage.     1. Acute on chronic respiratory failure. Viral syndrome. Influenza. Superimposed bacterial pneumonia. COPD exacerbation.   - imaging reviewed  - on IV cefepime 2gmq8h #3  - on doxycycline 100mg BID #3   - f/u cultures - no growth, check sputum cx  - on tamiflu 75mg BID #3/5 day course  - pulmonary f/u   - isolation precautions  - tolerating abx well so far; no side effects noted  - reason for abx use and side effects reviewed with patient  - supportive care  - fu cbc    2. other issues - care per medicine

## 2023-12-02 NOTE — PROGRESS NOTE ADULT - SUBJECTIVE AND OBJECTIVE BOX
Date of service: 12-02-23 @ 14:11    pt seen and examined   has dyspnea/sob  no fevers   tachycardic/tachypneic     ROS: no fever or chills; denies dizziness, no HA, no abdominal pain, no diarrhea or constipation; no dysuria, no urinary frequency, no legs pain, no rashes    MEDICATIONS  (STANDING):  aspirin enteric coated 81 milliGRAM(s) Oral daily  atorvastatin 80 milliGRAM(s) Oral at bedtime  budesonide 160 MICROgram(s)/formoterol 4.5 MICROgram(s) Inhaler 2 Puff(s) Inhalation two times a day  busPIRone 10 milliGRAM(s) Oral two times a day  cefepime  Injectable. 2000 milliGRAM(s) IV Push every 8 hours  cholecalciferol 1000 Unit(s) Oral daily  clopidogrel Tablet 75 milliGRAM(s) Oral daily  cyanocobalamin 1000 MICROGram(s) Oral daily  dextrose 50% Injectable 12.5 Gram(s) IV Push once  dextrose 50% Injectable 25 Gram(s) IV Push once  dextrose 50% Injectable 25 Gram(s) IV Push once  dextrose Oral Gel 15 Gram(s) Oral once  diltiazem    milliGRAM(s) Oral daily  doxazosin 1 milliGRAM(s) Oral at bedtime  doxycycline IVPB 100 milliGRAM(s) IV Intermittent every 12 hours  doxycycline IVPB      enoxaparin Injectable 40 milliGRAM(s) SubCutaneous every 12 hours  folic acid 1 milliGRAM(s) Oral daily  furosemide    Tablet 40 milliGRAM(s) Oral daily  gabapentin 400 milliGRAM(s) Oral three times a day  glucagon  Injectable 1 milliGRAM(s) IntraMuscular once  influenza  Vaccine (HIGH DOSE) 0.7 milliLiter(s) IntraMuscular once  insulin lispro (ADMELOG) corrective regimen sliding scale   SubCutaneous at bedtime  insulin lispro (ADMELOG) corrective regimen sliding scale   SubCutaneous three times a day before meals  metoprolol tartrate 25 milliGRAM(s) Oral two times a day  multivitamin 1 Tablet(s) Oral daily  oseltamivir 75 milliGRAM(s) Oral two times a day  pantoprazole    Tablet 40 milliGRAM(s) Oral before breakfast  psyllium Powder 2 Packet(s) Oral daily  QUEtiapine 100 milliGRAM(s) Oral at bedtime  thiamine 100 milliGRAM(s) Oral daily  tiotropium 2.5 MICROgram(s) Inhaler 2 Puff(s) Inhalation daily      Vital Signs Last 24 Hrs  T(C): 36.7 (02 Dec 2023 08:29), Max: 36.7 (02 Dec 2023 08:29)  T(F): 98 (02 Dec 2023 08:29), Max: 98 (02 Dec 2023 08:29)  HR: 109 (02 Dec 2023 08:51) (89 - 109)  BP: 110/63 (02 Dec 2023 08:51) (97/52 - 122/83)  BP(mean): 70 (01 Dec 2023 19:00) (70 - 78)  RR: 21 (02 Dec 2023 08:29) (21 - 32)  SpO2: 94% (02 Dec 2023 08:29) (94% - 98%)    Parameters below as of 02 Dec 2023 08:29  Patient On (Oxygen Delivery Method): nasal cannula  O2 Flow (L/min): 5      PE:  Constitutional: NAD   HEENT: NC/AT, EOMI, PERRLA, conjunctivae clear; ears and nose atraumatic; pharynx benign  Neck: supple; thyroid not palpable  Back: no tenderness  Respiratory: decreased breath sounds, rhonchi   Cardiovascular: S1S2 regular, no murmurs  Abdomen: soft, not tender, not distended, positive BS; liver and spleen WNL  Genitourinary: no suprapubic tenderness  Lymphatic: no LN palpable  Musculoskeletal: no muscle tenderness, no joint swelling or tenderness  Extremities: no pedal edema  Neurological/ Psychiatric: AxOx3, Judgement and insight normal;  moving all extremities  Skin: no rashes; no palpable lesions    Labs: all available labs reviewed                                   10.1   12.38 )-----------( 138      ( 02 Dec 2023 07:16 )             32.6     12-02    136  |  102  |  27<H>  ----------------------------<  92  4.3   |  30  |  0.83    Ca    8.4<L>      02 Dec 2023 07:16  Phos  2.6     12-02  Mg     1.9     12-02    TPro  6.0  /  Alb  2.4<L>  /  TBili  0.8  /  DBili  x   /  AST  12<L>  /  ALT  20  /  AlkPhos  60  12-01            Urinalysis Basic - ( 30 Nov 2023 08:18 )    Color: x / Appearance: x / SG: x / pH: x  Gluc: 94 mg/dL / Ketone: x  / Bili: x / Urobili: x   Blood: x / Protein: x / Nitrite: x   Leuk Esterase: x / RBC: x / WBC x   Sq Epi: x / Non Sq Epi: x / Bacteria: x    Culture - Blood (11.30.23 @ 08:18)   Specimen Source: .Blood Blood-Peripheral  Culture Results:   No growth at 48 Hours  Culture - Blood (11.30.23 @ 08:18)   Specimen Source: .Blood Blood-Peripheral  Culture Results:   No growth at 48 Hours          Radiology: all available radiological tests reviewed  < from: CT Chest No Cont (11.24.23 @ 11:36) >    ACC: [id number] EXAM:  CT CHEST   ORDERED BY: TAMMY NAVA     PROCEDURE DATE:  11/24/2023          INTERPRETATION:  EXAMINATION: CT CHEST    CLINICAL INDICATION: Respiratory failure.    TECHNIQUE: Noncontrast CT of the chest was obtained.    COMPARISON: Multiple CT, most recent 6/30/2023.    FINDINGS:    AIRWAYS AND LUNGS: Tracheal bronchial secretions.  Peripheral   reticulation with associated traction bronchiectasis and patchy opacity   that is increased from the prior study. No honeycombing.    MEDIASTINUM AND PLEURA: Mildly enlarged mediastinal lymph nodes. The   visualized portion of the thyroid gland is heterogeneous. There is no   pleural effusion. There is no pneumothorax.    HEART AND VESSELS: There is cardiomegaly.  There are atherosclerotic   calcifications of the aorta and coronary arteries.  There is no   pericardial effusion.  Left atrial appendage closure device.    UPPER ABDOMEN: Images of the upper abdomen demonstrate redemonstrated   splenic hypodensity..    BONES AND SOFT TISSUES: The bones are unremarkable.  The soft tissues are   unremarkable.    IMPRESSION:  Interstitial lung disease with increased groundglass opacity compared to   prior studies and may be infectious/inflammatory (including active   interstitial lung disease) versus fluid overload.        Advanced directives addressed: full resuscitation

## 2023-12-02 NOTE — PHYSICAL THERAPY INITIAL EVALUATION ADULT - LEVEL OF INDEPENDENCE: SIT/STAND, REHAB EVAL
Attempted PT eval x 3 today. Pt did agree to chair eval, but did not want to attempt amb at this time. Will attempt to increase mobility on 11/22/2023 if possible. Attempted PT angy Pt did not agree to chair nor amb

## 2023-12-02 NOTE — PROGRESS NOTE ADULT - SUBJECTIVE AND OBJECTIVE BOX
Pt is a 69 y/o Male with PMHx of Chronic resp failure 2/2 COPD (on 4-5L NC), chronic afib s/p watchman procedure (no AC due to hx GIB), HFpEF, PVD s/p L BKA, alcoholic liver cirrhosis, DT s/p trach now decannulated, HTN, VIJAY (noncompliant w/ CPAP), alcoholism, severe pulmonary HTN, morbid obesity, recent admission to  discharged on 11/29 for acute CVA (on DAPT), acute COPD exacerbation (discharged on prednisone taper), who presented to the ED < 24 hours after hospital discharge with complaints of severe dyspnea.    In the ED he was tachycardic to ~ 200 bpm and in respiratory distress; treated with BipAP and IV diltiazem. 11/30: On bipap, cardizem drip for rate control. + flu. Given IV cefepime/doxycycline for superimposed bacterial pna coverage. Admitted initially to ICU for further management.       Medical progress:   Complaints:  State of mind:     REVIEW OF SYSTEMS:  General: NAD, hemodynamically stable, (-)  fever, (-) chills, (-) weakness  HEENT:  Eyes:  No visual loss, blurred vision, double vision or yellow sclerae. Ears, Nose, Throat:  No hearing loss, sneezing, congestion, runny nose or sore throat.  SKIN:  No rash or itching.  CARDIOVASCULAR:  No chest pain, chest pressure or chest discomfort. No palpitations or edema.  RESPIRATORY:  No shortness of breath, cough or sputum.  GASTROINTESTINAL:  No anorexia, nausea, vomiting or diarrhea. No abdominal pain or blood.  NEUROLOGICAL:  No headache, dizziness, syncope, paralysis, ataxia, numbness or tingling in the extremities. No change in bowel or bladder control.  MUSCULOSKELETAL:  No muscle, back pain, joint pain or stiffness.  HEMATOLOGIC:  No anemia, bleeding or bruising.  LYMPHATICS:  No enlarged nodes. No history of splenectomy.  ENDOCRINOLOGIC:  No reports of sweating, cold or heat intolerance. No polyuria or polydipsia.  ALLERGIES:  No history of asthma, hives, eczema or rhinitis.    Physical Exam:   GENERAL APPEARANCE:  NAD, hemodynamically stable  T(C): 36.2 (12-01-23 @ 20:56), Max: 36.4 (12-01-23 @ 13:00)  HR: 101 (12-02-23 @ 02:10) (76 - 111)  BP: 122/83 (12-01-23 @ 20:56) (99/61 - 129/57)  RR: 26 (12-01-23 @ 20:56) (21 - 34)  SpO2: 95% (12-02-23 @ 02:10) (93% - 98%)  Wt(kg): --  HEENT:  Head is normocephalic    Skin:  Warm and dry without any rash   NECK:  Supple without lymphadenopathy.   HEART:  Regular rate and rhythm. normal S1 and S2, No M/R/G  LUNGS:  Good ins/exp effort, no W/R/R/C  ABDOMEN:  Soft, nontender, nondistended with good bowel sounds heard  EXTREMITIES:  Without cyanosis, clubbing or edema.   NEUROLOGICAL:  Gross nonfocal       CBC Full  -  ( 02 Dec 2023 07:16 )  WBC Count : 12.38 K/uL  RBC Count : 3.64 M/uL  Hemoglobin : 10.1 g/dL  Hematocrit : 32.6 %  Platelet Count - Automated : 138 K/uL  Mean Cell Volume : 89.6 fl  Mean Cell Hemoglobin : 27.7 pg  Mean Cell Hemoglobin Concentration : 31.0 gm/dL  Auto Neutrophil # : x  Auto Lymphocyte # : x  Auto Monocyte # : x  Auto Eosinophil # : x  Auto Basophil # : x  Auto Neutrophil % : x  Auto Lymphocyte % : x  Auto Monocyte % : x  Auto Eosinophil % : x  Auto Basophil % : x    PT/INR - ( 30 Nov 2023 12:37 )   PT: 13.0 sec;   INR: 1.16 ratio         PTT - ( 30 Nov 2023 12:37 )  PTT:24.6 sec  Urinalysis Basic - ( 01 Dec 2023 05:32 )    Color: x / Appearance: x / SG: x / pH: x  Gluc: 123 mg/dL / Ketone: x  / Bili: x / Urobili: x   Blood: x / Protein: x / Nitrite: x   Leuk Esterase: x / RBC: x / WBC x   Sq Epi: x / Non Sq Epi: x / Bacteria: x      12-01    137  |  102  |  31<H>  ----------------------------<  123<H>  4.2   |  29  |  0.95    Ca    8.4<L>      01 Dec 2023 05:32  Phos  3.6     12-01  Mg     1.7     12-01    TPro  6.0  /  Alb  2.4<L>  /  TBili  0.8  /  DBili  x   /  AST  12<L>  /  ALT  20  /  AlkPhos  60  12-01        #Acute on chronic hypoxic respiratory failure, Sepsis (POA) 2/2 Acute COPD exac, influenza w/ superimposed bacterial PNA (GNR suspected)    - s/p ICU requiring Bipap, now weaned to home 4-5L NC  - CT chest with multifocal PNA  - Sepsis criteria on arrival: tachycardia, elevated WBC, RR > 20, hypotension, lactate 2.6  - procalcitonin 10.1  - C/w IV cefepime/IV doxy   - C/w Tamiflu     - C/w duonebs q6, inhalers   - ID following  - WBC rising 14.8-> 17.7 (received IV solumedrol 125mg x1), continue to trend  - BCx NGTD    # Permanent atrial fibrillation RVR s/p watchman procedure  - severe tachycardia in ER upon presentation (likely related to respiratory distress in setting influenza A infection)  -  now improved to HR 90s   - diltiazem 180mg daily   - metoprolol tartrate 25mg BID    #Morbid obesity  - BMI 44, Nutrition consult    #Anemia   - Hgb 12.8 -> 9.9 since arrival   - Continue to trend   - No obvious s/s of bleeding noted currently   - F/u iron studies     #DVT ppx   - Lovenox     #Hx of HFpEF, PVD s/p L BKA, alcoholic liver cirrhosis, DT s/p trach now decannulated, HTN, VIJAY (noncompliant w/ CPAP), alcoholism, severe pulmonary HTN, recent acute CVA  - c/w home meds   - on DAPT for recent CVA  - PT consulted: pt lives at [facility], hx of L BKA, family requesting wheelchair on d/c.

## 2023-12-02 NOTE — PROGRESS NOTE ADULT - NUTRITIONAL ASSESSMENT
This patient has been assessed with a concern for Malnutrition and has been determined to have a diagnosis/diagnoses of Morbid obesity (BMI > 40).    This patient is being managed with:   Diet Regular-  Entered: Nov 30 2023  2:12PM

## 2023-12-02 NOTE — PHYSICAL THERAPY INITIAL EVALUATION ADULT - PERTINENT HX OF CURRENT PROBLEM, REHAB EVAL
It looks like Lexi'Pt is a 67 y/o Male with PMHx of Chronic resp failure 2/2 COPD (on 4-5L NC), chronic afib s/p watchman procedure (no AC due to hx GIB), HFpEF, PVD s/p L BKA, alcoholic liver cirrhosis, DT s/p trach now decannulated, HTN, VIJAY (noncompliant w/ CPAP), alcoholism, severe pulmonary HTN, morbid obesity, recent admission to  discharged on 11/29 for acute CVA (on DAPT), acute COPD exacerbation (discharged on prednisone taper), who presented to the ED < 24 hours after hospital discharge with complaints of severe dyspnea. pt lives at [facility], hx of L BKA, family requesting wheelchair on d/c.  CT chest with multifocal PNA

## 2023-12-03 ENCOUNTER — TRANSCRIPTION ENCOUNTER (OUTPATIENT)
Age: 68
End: 2023-12-03

## 2023-12-03 LAB
GLUCOSE BLDC GLUCOMTR-MCNC: 109 MG/DL — HIGH (ref 70–99)
GLUCOSE BLDC GLUCOMTR-MCNC: 109 MG/DL — HIGH (ref 70–99)
GLUCOSE BLDC GLUCOMTR-MCNC: 110 MG/DL — HIGH (ref 70–99)
GLUCOSE BLDC GLUCOMTR-MCNC: 110 MG/DL — HIGH (ref 70–99)
GLUCOSE BLDC GLUCOMTR-MCNC: 111 MG/DL — HIGH (ref 70–99)
GLUCOSE BLDC GLUCOMTR-MCNC: 111 MG/DL — HIGH (ref 70–99)

## 2023-12-03 PROCEDURE — 99232 SBSQ HOSP IP/OBS MODERATE 35: CPT

## 2023-12-03 RX ORDER — CEFUROXIME AXETIL 250 MG
500 TABLET ORAL EVERY 12 HOURS
Refills: 0 | Status: DISCONTINUED | OUTPATIENT
Start: 2023-12-03 | End: 2023-12-06

## 2023-12-03 RX ADMIN — ENOXAPARIN SODIUM 40 MILLIGRAM(S): 100 INJECTION SUBCUTANEOUS at 21:55

## 2023-12-03 RX ADMIN — BUDESONIDE AND FORMOTEROL FUMARATE DIHYDRATE 2 PUFF(S): 160; 4.5 AEROSOL RESPIRATORY (INHALATION) at 08:31

## 2023-12-03 RX ADMIN — BUDESONIDE AND FORMOTEROL FUMARATE DIHYDRATE 2 PUFF(S): 160; 4.5 AEROSOL RESPIRATORY (INHALATION) at 21:22

## 2023-12-03 RX ADMIN — Medication 180 MILLIGRAM(S): at 09:20

## 2023-12-03 RX ADMIN — CEFEPIME 2000 MILLIGRAM(S): 1 INJECTION, POWDER, FOR SOLUTION INTRAMUSCULAR; INTRAVENOUS at 06:40

## 2023-12-03 RX ADMIN — QUETIAPINE FUMARATE 100 MILLIGRAM(S): 200 TABLET, FILM COATED ORAL at 22:02

## 2023-12-03 RX ADMIN — Medication 100 MILLIGRAM(S): at 09:21

## 2023-12-03 RX ADMIN — ALBUTEROL 2 PUFF(S): 90 AEROSOL, METERED ORAL at 21:22

## 2023-12-03 RX ADMIN — ENOXAPARIN SODIUM 40 MILLIGRAM(S): 100 INJECTION SUBCUTANEOUS at 09:18

## 2023-12-03 RX ADMIN — Medication 500 MILLIGRAM(S): at 22:00

## 2023-12-03 RX ADMIN — Medication 25 MILLIGRAM(S): at 09:21

## 2023-12-03 RX ADMIN — Medication 500 MILLIGRAM(S): at 09:47

## 2023-12-03 RX ADMIN — Medication 10 MILLIGRAM(S): at 22:01

## 2023-12-03 RX ADMIN — Medication 1 MILLIGRAM(S): at 22:00

## 2023-12-03 RX ADMIN — PANTOPRAZOLE SODIUM 40 MILLIGRAM(S): 20 TABLET, DELAYED RELEASE ORAL at 05:37

## 2023-12-03 RX ADMIN — Medication 10 MILLIGRAM(S): at 09:19

## 2023-12-03 RX ADMIN — ATORVASTATIN CALCIUM 80 MILLIGRAM(S): 80 TABLET, FILM COATED ORAL at 22:00

## 2023-12-03 RX ADMIN — Medication 75 MILLIGRAM(S): at 22:00

## 2023-12-03 RX ADMIN — GABAPENTIN 400 MILLIGRAM(S): 400 CAPSULE ORAL at 05:35

## 2023-12-03 RX ADMIN — Medication 75 MILLIGRAM(S): at 09:22

## 2023-12-03 RX ADMIN — Medication 100 MILLIGRAM(S): at 21:56

## 2023-12-03 RX ADMIN — Medication 25 MILLIGRAM(S): at 22:00

## 2023-12-03 RX ADMIN — GABAPENTIN 400 MILLIGRAM(S): 400 CAPSULE ORAL at 15:11

## 2023-12-03 RX ADMIN — GABAPENTIN 400 MILLIGRAM(S): 400 CAPSULE ORAL at 22:00

## 2023-12-03 RX ADMIN — Medication 40 MILLIGRAM(S): at 09:23

## 2023-12-03 RX ADMIN — Medication 1000 UNIT(S): at 09:22

## 2023-12-03 RX ADMIN — PREGABALIN 1000 MICROGRAM(S): 225 CAPSULE ORAL at 09:21

## 2023-12-03 RX ADMIN — Medication 81 MILLIGRAM(S): at 09:22

## 2023-12-03 RX ADMIN — Medication 110 MILLIGRAM(S): at 06:40

## 2023-12-03 RX ADMIN — CLOPIDOGREL BISULFATE 75 MILLIGRAM(S): 75 TABLET, FILM COATED ORAL at 09:19

## 2023-12-03 RX ADMIN — Medication 100 MILLIGRAM(S): at 22:00

## 2023-12-03 RX ADMIN — Medication 100 MILLIGRAM(S): at 09:23

## 2023-12-03 RX ADMIN — TIOTROPIUM BROMIDE 2 PUFF(S): 18 CAPSULE ORAL; RESPIRATORY (INHALATION) at 08:31

## 2023-12-03 RX ADMIN — Medication 100 MILLIGRAM(S): at 09:25

## 2023-12-03 RX ADMIN — ALBUTEROL 2 PUFF(S): 90 AEROSOL, METERED ORAL at 08:31

## 2023-12-03 RX ADMIN — Medication 2 PACKET(S): at 09:23

## 2023-12-03 RX ADMIN — Medication 1 MILLIGRAM(S): at 09:20

## 2023-12-03 RX ADMIN — Medication 1 TABLET(S): at 09:20

## 2023-12-03 NOTE — PROGRESS NOTE ADULT - ASSESSMENT
69 yo M PMHx multiple medical problems, including HFpEF, atrial fibrillation s/p Watchman (not anticoagulated due to GI bleeding), mod MR, COPD on 4-5 L of home O2,  etOH-induced cirrhosis, DT s/p trach now decannulated, HTN, obesity, VIJAY (noncompliant w/ CPAP), alcoholism, severe pulmonary HTN, Left BKA, PAD, who presented to the ED < 24 hours after hospital discharge with complaints of severe dyspnea after apparently sleeping without supplemental O2 last night.  He was hospitalized from 11/17-11/29 with acute CVA associated with R ICA stenosis + exacerbation of COPD (treated with prednisone and doxycycline). He thinks his nasal cannula fell out of his nose last night and he awakened with great difficulty breathing.  He was apparently found to be hypoxic, cyanotic, and in AF with RVR when evaluated by EMS. In the ED he was tachycardic to ~ 200 bpm and in respiratory distress; treated with BipAP and IV diltiazem. 11/30: On bipap, cardizem drip for rate control. + flu. Given IV cefepime/doxycycline for superimposed bacterial pna coverage.     1. Acute on chronic respiratory failure. Viral syndrome. Influenza. Superimposed bacterial pneumonia. COPD exacerbation.   - imaging reviewed  - on IV cefepime 2gmq8h #4  - on doxycycline 100mg BID #4   - f/u cultures - no growth, check sputum cx  - on tamiflu 75mg BID #4/5 day course  - pulmonary f/u   - isolation precautions  - tolerating abx well so far; no side effects noted  - reason for abx use and side effects reviewed with patient  - supportive care  - fu cbc    2. other issues - care per medicine  67 yo M PMHx multiple medical problems, including HFpEF, atrial fibrillation s/p Watchman (not anticoagulated due to GI bleeding), mod MR, COPD on 4-5 L of home O2,  etOH-induced cirrhosis, DT s/p trach now decannulated, HTN, obesity, VIJAY (noncompliant w/ CPAP), alcoholism, severe pulmonary HTN, Left BKA, PAD, who presented to the ED < 24 hours after hospital discharge with complaints of severe dyspnea after apparently sleeping without supplemental O2 last night.  He was hospitalized from 11/17-11/29 with acute CVA associated with R ICA stenosis + exacerbation of COPD (treated with prednisone and doxycycline). He thinks his nasal cannula fell out of his nose last night and he awakened with great difficulty breathing.  He was apparently found to be hypoxic, cyanotic, and in AF with RVR when evaluated by EMS. In the ED he was tachycardic to ~ 200 bpm and in respiratory distress; treated with BipAP and IV diltiazem. 11/30: On bipap, cardizem drip for rate control. + flu. Given IV cefepime/doxycycline for superimposed bacterial pna coverage.     1. Acute on chronic respiratory failure. Viral syndrome. Influenza. Superimposed bacterial pneumonia. COPD exacerbation.   - imaging reviewed  - on IV cefepime 2gmq8h #4  - on doxycycline 100mg BID #4   - f/u cultures - no growth, check sputum cx  - on tamiflu 75mg BID #4/5 day course  - pulmonary f/u   - isolation precautions  - tolerating abx well so far; no side effects noted  - reason for abx use and side effects reviewed with patient  - supportive care  - fu cbc    2. other issues - care per medicine  67 yo M PMHx multiple medical problems, including HFpEF, atrial fibrillation s/p Watchman (not anticoagulated due to GI bleeding), mod MR, COPD on 4-5 L of home O2,  etOH-induced cirrhosis, DT s/p trach now decannulated, HTN, obesity, VIJAY (noncompliant w/ CPAP), alcoholism, severe pulmonary HTN, Left BKA, PAD, who presented to the ED < 24 hours after hospital discharge with complaints of severe dyspnea after apparently sleeping without supplemental O2 last night.  He was hospitalized from 11/17-11/29 with acute CVA associated with R ICA stenosis + exacerbation of COPD (treated with prednisone and doxycycline). He thinks his nasal cannula fell out of his nose last night and he awakened with great difficulty breathing.  He was apparently found to be hypoxic, cyanotic, and in AF with RVR when evaluated by EMS. In the ED he was tachycardic to ~ 200 bpm and in respiratory distress; treated with BipAP and IV diltiazem. 11/30: On bipap, cardizem drip for rate control. + flu. Given IV cefepime/doxycycline for superimposed bacterial pna coverage.     1. Acute on chronic respiratory failure. Viral syndrome. Influenza. Superimposed bacterial pneumonia. COPD exacerbation.   - imaging reviewed  - s/p IV cefepime 2gmq8h #3, change to po ceftin  - on doxycycline 100mg BID #4   - f/u cultures - no growth, check sputum cx  - on tamiflu 75mg BID #4/5 day course  - pulmonary f/u   - isolation precautions  - tolerating abx well so far; no side effects noted  - reason for abx use and side effects reviewed with patient  - complete 7 day course of po doxy/ceftin  - supportive care  - fu cbc    2. other issues - care per medicine  69 yo M PMHx multiple medical problems, including HFpEF, atrial fibrillation s/p Watchman (not anticoagulated due to GI bleeding), mod MR, COPD on 4-5 L of home O2,  etOH-induced cirrhosis, DT s/p trach now decannulated, HTN, obesity, VIJAY (noncompliant w/ CPAP), alcoholism, severe pulmonary HTN, Left BKA, PAD, who presented to the ED < 24 hours after hospital discharge with complaints of severe dyspnea after apparently sleeping without supplemental O2 last night.  He was hospitalized from 11/17-11/29 with acute CVA associated with R ICA stenosis + exacerbation of COPD (treated with prednisone and doxycycline). He thinks his nasal cannula fell out of his nose last night and he awakened with great difficulty breathing.  He was apparently found to be hypoxic, cyanotic, and in AF with RVR when evaluated by EMS. In the ED he was tachycardic to ~ 200 bpm and in respiratory distress; treated with BipAP and IV diltiazem. 11/30: On bipap, cardizem drip for rate control. + flu. Given IV cefepime/doxycycline for superimposed bacterial pna coverage.     1. Acute on chronic respiratory failure. Viral syndrome. Influenza. Superimposed bacterial pneumonia. COPD exacerbation.   - imaging reviewed  - s/p IV cefepime 2gmq8h #3, change to po ceftin  - on doxycycline 100mg BID #4   - f/u cultures - no growth, check sputum cx  - on tamiflu 75mg BID #4/5 day course  - pulmonary f/u   - isolation precautions  - tolerating abx well so far; no side effects noted  - reason for abx use and side effects reviewed with patient  - complete 7 day course of po doxy/ceftin  - supportive care  - fu cbc    2. other issues - care per medicine

## 2023-12-03 NOTE — PROGRESS NOTE ADULT - SUBJECTIVE AND OBJECTIVE BOX
Date of service: 12-03-23 @ 20:04    pt seen and examined   no fevers   feels better  comfortable    ROS: no fever or chills; denies dizziness, no HA, no abdominal pain, no diarrhea or constipation; no dysuria, no urinary frequency, no legs pain, no rashes    MEDICATIONS  (STANDING):  aspirin enteric coated 81 milliGRAM(s) Oral daily  atorvastatin 80 milliGRAM(s) Oral at bedtime  budesonide 160 MICROgram(s)/formoterol 4.5 MICROgram(s) Inhaler 2 Puff(s) Inhalation two times a day  busPIRone 10 milliGRAM(s) Oral two times a day  cefuroxime   Tablet 500 milliGRAM(s) Oral every 12 hours  cholecalciferol 1000 Unit(s) Oral daily  clopidogrel Tablet 75 milliGRAM(s) Oral daily  cyanocobalamin 1000 MICROGram(s) Oral daily  dextrose 50% Injectable 12.5 Gram(s) IV Push once  dextrose 50% Injectable 25 Gram(s) IV Push once  dextrose 50% Injectable 25 Gram(s) IV Push once  dextrose Oral Gel 15 Gram(s) Oral once  diltiazem    milliGRAM(s) Oral daily  doxazosin 1 milliGRAM(s) Oral at bedtime  doxycycline monohydrate Capsule 100 milliGRAM(s) Oral every 12 hours  enoxaparin Injectable 40 milliGRAM(s) SubCutaneous every 12 hours  folic acid 1 milliGRAM(s) Oral daily  furosemide    Tablet 40 milliGRAM(s) Oral daily  gabapentin 400 milliGRAM(s) Oral three times a day  glucagon  Injectable 1 milliGRAM(s) IntraMuscular once  influenza  Vaccine (HIGH DOSE) 0.7 milliLiter(s) IntraMuscular once  insulin lispro (ADMELOG) corrective regimen sliding scale   SubCutaneous at bedtime  insulin lispro (ADMELOG) corrective regimen sliding scale   SubCutaneous three times a day before meals  metoprolol tartrate 25 milliGRAM(s) Oral two times a day  multivitamin 1 Tablet(s) Oral daily  oseltamivir 75 milliGRAM(s) Oral two times a day  pantoprazole    Tablet 40 milliGRAM(s) Oral before breakfast  psyllium Powder 2 Packet(s) Oral daily  QUEtiapine 100 milliGRAM(s) Oral at bedtime  thiamine 100 milliGRAM(s) Oral daily  tiotropium 2.5 MICROgram(s) Inhaler 2 Puff(s) Inhalation daily      Vital Signs Last 24 Hrs  T(C): 36.2 (03 Dec 2023 16:37), Max: 36.8 (02 Dec 2023 20:42)  T(F): 97.2 (03 Dec 2023 16:37), Max: 98.3 (02 Dec 2023 20:42)  HR: 93 (03 Dec 2023 16:37) (76 - 115)  BP: 102/63 (03 Dec 2023 16:37) (102/63 - 126/68)  BP(mean): --  RR: 19 (03 Dec 2023 16:37) (19 - 22)  SpO2: 94% (03 Dec 2023 16:37) (94% - 96%)    Parameters below as of 03 Dec 2023 16:37  Patient On (Oxygen Delivery Method): nasal cannula      PE:  Constitutional: NAD   HEENT: NC/AT, EOMI, PERRLA, conjunctivae clear; ears and nose atraumatic; pharynx benign  Neck: supple; thyroid not palpable  Back: no tenderness  Respiratory: decreased breath sounds, rhonchi   Cardiovascular: S1S2 regular, no murmurs  Abdomen: soft, not tender, not distended, positive BS; liver and spleen WNL  Genitourinary: no suprapubic tenderness  Lymphatic: no LN palpable  Musculoskeletal: no muscle tenderness, no joint swelling or tenderness  Extremities: no pedal edema  Neurological/ Psychiatric: AxOx3, Judgement and insight normal;  moving all extremities  Skin: no rashes; no palpable lesions    Labs: all available labs reviewed                                   10.1   12.38 )-----------( 138      ( 02 Dec 2023 07:16 )             32.6     12-02    136  |  102  |  27<H>  ----------------------------<  92  4.3   |  30  |  0.83    Ca    8.4<L>      02 Dec 2023 07:16  Phos  2.6     12-02  Mg     1.9     12-02      Urinalysis Basic - ( 30 Nov 2023 08:18 )    Color: x / Appearance: x / SG: x / pH: x  Gluc: 94 mg/dL / Ketone: x  / Bili: x / Urobili: x   Blood: x / Protein: x / Nitrite: x   Leuk Esterase: x / RBC: x / WBC x   Sq Epi: x / Non Sq Epi: x / Bacteria: x    Culture - Blood (11.30.23 @ 08:18)   Specimen Source: .Blood Blood-Peripheral  Culture Results:   No growth at 48 Hours  Culture - Blood (11.30.23 @ 08:18)   Specimen Source: .Blood Blood-Peripheral  Culture Results:   No growth at 48 Hours    Radiology: all available radiological tests reviewed  < from: CT Chest No Cont (11.24.23 @ 11:36) >    ACC: [id number] EXAM:  CT CHEST   ORDERED BY: TAMMY NAVA     PROCEDURE DATE:  11/24/2023          INTERPRETATION:  EXAMINATION: CT CHEST    CLINICAL INDICATION: Respiratory failure.    TECHNIQUE: Noncontrast CT of the chest was obtained.    COMPARISON: Multiple CT, most recent 6/30/2023.    FINDINGS:    AIRWAYS AND LUNGS: Tracheal bronchial secretions.  Peripheral   reticulation with associated traction bronchiectasis and patchy opacity   that is increased from the prior study. No honeycombing.    MEDIASTINUM AND PLEURA: Mildly enlarged mediastinal lymph nodes. The   visualized portion of the thyroid gland is heterogeneous. There is no   pleural effusion. There is no pneumothorax.    HEART AND VESSELS: There is cardiomegaly.  There are atherosclerotic   calcifications of the aorta and coronary arteries.  There is no   pericardial effusion.  Left atrial appendage closure device.    UPPER ABDOMEN: Images of the upper abdomen demonstrate redemonstrated   splenic hypodensity..    BONES AND SOFT TISSUES: The bones are unremarkable.  The soft tissues are   unremarkable.    IMPRESSION:  Interstitial lung disease with increased groundglass opacity compared to   prior studies and may be infectious/inflammatory (including active   interstitial lung disease) versus fluid overload.        Advanced directives addressed: full resuscitation   Date of service: 12-03-23 @ 20:04    pt seen and examined   no fevers   feels better  comfortable    ROS: no fever or chills; denies dizziness, no HA, no abdominal pain, no diarrhea or constipation; no dysuria, no urinary frequency, no legs pain, no rashes    MEDICATIONS  (STANDING):  aspirin enteric coated 81 milliGRAM(s) Oral daily  atorvastatin 80 milliGRAM(s) Oral at bedtime  budesonide 160 MICROgram(s)/formoterol 4.5 MICROgram(s) Inhaler 2 Puff(s) Inhalation two times a day  busPIRone 10 milliGRAM(s) Oral two times a day  cefuroxime   Tablet 500 milliGRAM(s) Oral every 12 hours  cholecalciferol 1000 Unit(s) Oral daily  clopidogrel Tablet 75 milliGRAM(s) Oral daily  cyanocobalamin 1000 MICROGram(s) Oral daily  dextrose 50% Injectable 12.5 Gram(s) IV Push once  dextrose 50% Injectable 25 Gram(s) IV Push once  dextrose 50% Injectable 25 Gram(s) IV Push once  dextrose Oral Gel 15 Gram(s) Oral once  diltiazem    milliGRAM(s) Oral daily  doxazosin 1 milliGRAM(s) Oral at bedtime  doxycycline monohydrate Capsule 100 milliGRAM(s) Oral every 12 hours  enoxaparin Injectable 40 milliGRAM(s) SubCutaneous every 12 hours  folic acid 1 milliGRAM(s) Oral daily  furosemide    Tablet 40 milliGRAM(s) Oral daily  gabapentin 400 milliGRAM(s) Oral three times a day  glucagon  Injectable 1 milliGRAM(s) IntraMuscular once  influenza  Vaccine (HIGH DOSE) 0.7 milliLiter(s) IntraMuscular once  insulin lispro (ADMELOG) corrective regimen sliding scale   SubCutaneous at bedtime  insulin lispro (ADMELOG) corrective regimen sliding scale   SubCutaneous three times a day before meals  metoprolol tartrate 25 milliGRAM(s) Oral two times a day  multivitamin 1 Tablet(s) Oral daily  oseltamivir 75 milliGRAM(s) Oral two times a day  pantoprazole    Tablet 40 milliGRAM(s) Oral before breakfast  psyllium Powder 2 Packet(s) Oral daily  QUEtiapine 100 milliGRAM(s) Oral at bedtime  thiamine 100 milliGRAM(s) Oral daily  tiotropium 2.5 MICROgram(s) Inhaler 2 Puff(s) Inhalation daily      Vital Signs Last 24 Hrs  T(C): 36.2 (03 Dec 2023 16:37), Max: 36.8 (02 Dec 2023 20:42)  T(F): 97.2 (03 Dec 2023 16:37), Max: 98.3 (02 Dec 2023 20:42)  HR: 93 (03 Dec 2023 16:37) (76 - 115)  BP: 102/63 (03 Dec 2023 16:37) (102/63 - 126/68)  BP(mean): --  RR: 19 (03 Dec 2023 16:37) (19 - 22)  SpO2: 94% (03 Dec 2023 16:37) (94% - 96%)    Parameters below as of 03 Dec 2023 16:37  Patient On (Oxygen Delivery Method): nasal cannula      PE:  Constitutional: NAD   HEENT: NC/AT, EOMI, PERRLA, conjunctivae clear; ears and nose atraumatic; pharynx benign  Neck: supple; thyroid not palpable  Back: no tenderness  Respiratory: decreased breath sounds, rhonchi   Cardiovascular: S1S2 regular, no murmurs  Abdomen: soft, not tender, not distended, positive BS; liver and spleen WNL  Genitourinary: no suprapubic tenderness  Lymphatic: no LN palpable  Musculoskeletal: no muscle tenderness, no joint swelling or tenderness  Extremities: no pedal edema  Neurological/ Psychiatric: AxOx3, Judgement and insight normal;  moving all extremities  Skin: no rashes; no palpable lesions    Labs: all available labs reviewed                                   10.1   12.38 )-----------( 138      ( 02 Dec 2023 07:16 )             32.6     12-02    136  |  102  |  27<H>  ----------------------------<  92  4.3   |  30  |  0.83    Ca    8.4<L>      02 Dec 2023 07:16  Phos  2.6     12-02  Mg     1.9     12-02      Urinalysis Basic - ( 30 Nov 2023 08:18 )    Color: x / Appearance: x / SG: x / pH: x  Gluc: 94 mg/dL / Ketone: x  / Bili: x / Urobili: x   Blood: x / Protein: x / Nitrite: x   Leuk Esterase: x / RBC: x / WBC x   Sq Epi: x / Non Sq Epi: x / Bacteria: x    Culture - Blood (11.30.23 @ 08:18)   Specimen Source: .Blood Blood-Peripheral  Culture Results:   No growth at 48 Hours  Culture - Blood (11.30.23 @ 08:18)   Specimen Source: .Blood Blood-Peripheral  Culture Results:   No growth at 48 Hours    Radiology: all available radiological tests reviewed  < from: CT Chest No Cont (11.24.23 @ 11:36) >    ACC: [id number] EXAM:  CT CHEST   ORDERED BY: TAMMY NAVA     PROCEDURE DATE:  11/24/2023          INTERPRETATION:  EXAMINATION: CT CHEST    CLINICAL INDICATION: Respiratory failure.    TECHNIQUE: Noncontrast CT of the chest was obtained.    COMPARISON: Multiple CT, most recent 6/30/2023.    FINDINGS:    AIRWAYS AND LUNGS: Tracheal bronchial secretions.  Peripheral   reticulation with associated traction bronchiectasis and patchy opacity   that is increased from the prior study. No honeycombing.    MEDIASTINUM AND PLEURA: Mildly enlarged mediastinal lymph nodes. The   visualized portion of the thyroid gland is heterogeneous. There is no   pleural effusion. There is no pneumothorax.    HEART AND VESSELS: There is cardiomegaly.  There are atherosclerotic   calcifications of the aorta and coronary arteries.  There is no   pericardial effusion.  Left atrial appendage closure device.    UPPER ABDOMEN: Images of the upper abdomen demonstrate redemonstrated   splenic hypodensity..    BONES AND SOFT TISSUES: The bones are unremarkable.  The soft tissues are   unremarkable.    IMPRESSION:  Interstitial lung disease with increased groundglass opacity compared to   prior studies and may be infectious/inflammatory (including active   interstitial lung disease) versus fluid overload.        Advanced directives addressed: full resuscitation

## 2023-12-03 NOTE — DISCHARGE NOTE PROVIDER - NSDCMRMEDTOKEN_GEN_ALL_CORE_FT
albuterol 90 mcg/inh inhalation aerosol: 2 puff(s) inhaled every 4 hours, As Needed for wheezing  Aspirin Enteric Coated 81 mg oral delayed release tablet: 1 tab(s) orally once a day  atorvastatin 80 mg oral tablet: 1 tab(s) orally once a day (at bedtime)  budesonide-formoterol 160 mcg-4.5 mcg/inh inhalation aerosol: 2 puff(s) inhaled 2 times a day  busPIRone 10 mg oral tablet: 1 tab(s) orally 2 times a day  Cardizem  mg/24 hours oral tablet, extended release: 1 tab(s) orally once a day  cholecalciferol 25 mcg (1000 intl units) oral tablet: 1 tab(s) orally once a day  clopidogrel 75 mg oral tablet: 1 tab(s) orally once a day  Colace 100 mg oral capsule: 2 cap(s) orally once a day  cyanocobalamin 1000 mcg oral tablet: 1 tab(s) orally once a day  doxazosin 1 mg oral tablet: 1 tab(s) orally once a day (at bedtime)  doxycycline monohydrate 50 mg oral capsule: 2 cap(s) orally every 12 hours x 4 days ***Course Not Complete***  Farxiga 10 mg oral tablet: 1 tab(s) orally once a day  folic acid 1 mg oral tablet: 1 tab(s) orally once a day  furosemide 20 mg oral tablet: 2 tab(s) orally once a day  gabapentin 400 mg oral capsule: 1 cap(s) orally 3 times a day  meclizine 25 mg oral tablet: 1 tab(s) orally every 8 hours as needed for Dizziness  Metoprolol Tartrate 25 mg oral tablet: 1 tab(s) orally 2 times a day  Multiple Vitamins oral tablet: 1 tab(s) orally once a day  omeprazole 40 mg oral delayed release capsule: 1 cap(s) orally once a day  predniSONE 10 mg oral tablet: 1 tab(s) orally once a day Take 4 tabs (40mg) once a day for 3 days, then take 3 tabs (30mg) once a day for 3 days, then take 2 tabs (20mg) once a day for 3 days, then take 1 tab (10mg) once a day for 3 days, then stop. ***Course Not Complete***  psyllium 500 mg oral capsule: 2 cap(s) orally once a day  QUEtiapine 50 mg oral tablet: 2 tab(s) orally once a day (at bedtime)  spironolactone 25 mg oral tablet: 2 tab(s) orally once a day  thiamine 100 mg oral tablet: 1 tab(s) orally once a day  tiotropium 2.5 mcg/inh inhalation aerosol: 2 puff(s) inhaled once a day   albuterol 90 mcg/inh inhalation aerosol: 2 puff(s) inhaled every 4 hours, As Needed for wheezing  Aspirin Enteric Coated 81 mg oral delayed release tablet: 1 tab(s) orally once a day  atorvastatin 80 mg oral tablet: 1 tab(s) orally once a day (at bedtime)  budesonide-formoterol 160 mcg-4.5 mcg/inh inhalation aerosol: 2 puff(s) inhaled 2 times a day  busPIRone 10 mg oral tablet: 1 tab(s) orally 2 times a day  Cardizem  mg/24 hours oral tablet, extended release: 1 tab(s) orally once a day  cefuroxime 500 mg oral tablet: 1 tab(s) orally every 12 hours  cholecalciferol 25 mcg (1000 intl units) oral tablet: 1 tab(s) orally once a day  clopidogrel 75 mg oral tablet: 1 tab(s) orally once a day  Colace 100 mg oral capsule: 2 cap(s) orally once a day  cyanocobalamin 1000 mcg oral tablet: 1 tab(s) orally once a day  doxazosin 1 mg oral tablet: 1 tab(s) orally once a day (at bedtime)  doxycycline monohydrate 50 mg oral capsule: 2 cap(s) orally every 12 hours x 4 days ***Course Not Complete***  Farxiga 10 mg oral tablet: 1 tab(s) orally once a day  folic acid 1 mg oral tablet: 1 tab(s) orally once a day  furosemide 20 mg oral tablet: 2 tab(s) orally once a day  gabapentin 400 mg oral capsule: 1 cap(s) orally 3 times a day  meclizine 25 mg oral tablet: 1 tab(s) orally every 8 hours as needed for Dizziness  Metoprolol Tartrate 25 mg oral tablet: 1 tab(s) orally 2 times a day  Multiple Vitamins oral tablet: 1 tab(s) orally once a day  omeprazole 40 mg oral delayed release capsule: 1 cap(s) orally once a day  predniSONE 10 mg oral tablet: 1 tab(s) orally once a day Take 4 tabs (40mg) once a day for 3 days, then take 3 tabs (30mg) once a day for 3 days, then take 2 tabs (20mg) once a day for 3 days, then take 1 tab (10mg) once a day for 3 days, then stop. ***Course Not Complete***  psyllium 500 mg oral capsule: 2 cap(s) orally once a day  QUEtiapine 50 mg oral tablet: 2 tab(s) orally once a day (at bedtime)  spironolactone 25 mg oral tablet: 2 tab(s) orally once a day  thiamine 100 mg oral tablet: 1 tab(s) orally once a day  tiotropium 2.5 mcg/inh inhalation aerosol: 2 puff(s) inhaled once a day

## 2023-12-03 NOTE — PROGRESS NOTE ADULT - PROVIDER SPECIALTY LIST ADULT
Critical Care
Hospitalist
Infectious Disease
Cardiology
Critical Care

## 2023-12-03 NOTE — DISCHARGE NOTE PROVIDER - HOSPITAL COURSE
Pt is a 69 y/o Male with PMHx of Chronic resp failure 2/2 COPD (on 4-5L NC), chronic afib s/p watchman procedure (no AC due to hx GIB), HFpEF, PVD s/p L BKA, alcoholic liver cirrhosis, DT s/p trach now decannulated, HTN, VIJAY (noncompliant w/ CPAP), alcoholism, severe pulmonary HTN, morbid obesity, recent admission to  discharged on 11/29 for acute CVA (on DAPT), acute COPD exacerbation (discharged on prednisone taper), who presented to the ED < 24 hours after hospital discharge with complaints of severe dyspnea.    In the ED he was tachycardic to ~ 200 bpm and in respiratory distress; treated with BipAP and IV diltiazem. 11/30: On bipap, cardizem drip for rate control. + flu. Given IV cefepime/doxycycline for superimposed bacterial pna coverage. Admitted initially to ICU for further management.     Patient feels this am at his baseline. Denies any HA, CP, SOB. Comfortable. Chronically SOB - on 5 L at home. D/c planning in the am       Pt is a 67 y/o Male with PMHx of Chronic resp failure 2/2 COPD (on 4-5L NC), chronic afib s/p watchman procedure (no AC due to hx GIB), HFpEF, PVD s/p L BKA, alcoholic liver cirrhosis, DT s/p trach now decannulated, HTN, VIJAY (noncompliant w/ CPAP), alcoholism, severe pulmonary HTN, morbid obesity, recent admission to  discharged on 11/29 for acute CVA (on DAPT), acute COPD exacerbation (discharged on prednisone taper), who presented to the ED < 24 hours after hospital discharge with complaints of severe dyspnea.    In the ED he was tachycardic to ~ 200 bpm and in respiratory distress; treated with BipAP and IV diltiazem. 11/30: On bipap, cardizem drip for rate control. + flu. Given IV cefepime/doxycycline for superimposed bacterial pna coverage. Admitted initially to ICU for further management.     Patient is comfortable. on HOme dose O2. Care discussed with Pulmonary -  patient is high risk of re-admission.       Physical Exam:   GENERAL APPEARANCE:  very frail, deconditioned, sick appearing, obese  T(C): 36.2 (12-03-23 @ 16:37), Max: 36.2 (12-03-23 @ 16:37)  HR: 95 (12-03-23 @ 21:56) (76 - 96)  BP: 118/67 (12-03-23 @ 21:56) (102/63 - 118/67)  RR: 19 (12-03-23 @ 21:56) (19 - 19)  SpO2: 98% (12-03-23 @ 21:56) (94% - 98%)  HEENT:  Head is normocephalic    Skin:  thin and dry  NECK:  Supple without lymphadenopathy.   HEART:  Regular rate and rhythm. normal S1 and S2, No M/R/G  LUNGS:  + ronchi  ABDOMEN:  Soft, nontender, nondistended with good bowel sounds heard  EXTREMITIES:  Without cyanosis, clubbing or edema.   NEUROLOGICAL:  Gross nonfocal        Pt is a 69 y/o Male with PMHx of Chronic resp failure 2/2 COPD (on 4-5L NC), chronic afib s/p watchman procedure (no AC due to hx GIB), HFpEF, PVD s/p L BKA, alcoholic liver cirrhosis, DT s/p trach now decannulated, HTN, VIJAY (noncompliant w/ CPAP), alcoholism, severe pulmonary HTN, morbid obesity, recent admission to  discharged on 11/29 for acute CVA (on DAPT), acute COPD exacerbation (discharged on prednisone taper), who presented to the ED < 24 hours after hospital discharge with complaints of severe dyspnea.    In the ED he was tachycardic to ~ 200 bpm and in respiratory distress; treated with BipAP and IV diltiazem. 11/30: On bipap, cardizem drip for rate control. + flu. Given IV cefepime/doxycycline for superimposed bacterial pna coverage. Admitted initially to ICU for further management.     Patient is comfortable. on HOme dose O2. Care discussed with Pulmonary -  patient is high risk of re-admission.       Physical Exam:   GENERAL APPEARANCE:  very frail, deconditioned, sick appearing, obese  T(C): 36.2 (12-03-23 @ 16:37), Max: 36.2 (12-03-23 @ 16:37)  HR: 95 (12-03-23 @ 21:56) (76 - 96)  BP: 118/67 (12-03-23 @ 21:56) (102/63 - 118/67)  RR: 19 (12-03-23 @ 21:56) (19 - 19)  SpO2: 98% (12-03-23 @ 21:56) (94% - 98%)  HEENT:  Head is normocephalic    Skin:  thin and dry  NECK:  Supple without lymphadenopathy.   HEART:  Regular rate and rhythm. normal S1 and S2, No M/R/G  LUNGS:  + ronchi  ABDOMEN:  Soft, nontender, nondistended with good bowel sounds heard  EXTREMITIES:  Without cyanosis, clubbing or edema.   NEUROLOGICAL:  Gross nonfocal        Pt is a 67 y/o Male with PMHx of Chronic resp failure 2/2 COPD (on 4-5L NC), chronic afib s/p watchman procedure (no AC due to hx GIB), HFpEF, PVD s/p L BKA, alcoholic liver cirrhosis, DT s/p trach now decannulated, HTN, VIJAY (noncompliant w/ CPAP), alcoholism, severe pulmonary HTN, morbid obesity, recent admission to  discharged on 11/29 for acute CVA (on DAPT), acute COPD exacerbation (discharged on prednisone taper), who presented to the ED < 24 hours after hospital discharge with complaints of severe dyspnea.    In the ED he was tachycardic to ~ 200 bpm and in respiratory distress; treated with BipAP and IV diltiazem. 11/30: On bipap, cardizem drip for rate control. + flu. Given IV cefepime/doxycycline for superimposed bacterial pna coverage. Admitted initially to ICU for further management.     Patient is comfortable. on HOme dose O2. Care discussed with Pulmonary -  patient is high risk of re-admission.       Despite IV steroids and bronchodilators patient desaturates.    - Room air pulse ox while ambulating: < 88%    - Pulse ox while ambulating on 3.5 liters n/c  O2 96%    Patient will require home O2 for discharge.  Patient is aware and agreeable to home O2.  Patient is in a chronic stable state of COPD.      Patient treated for pneumonia: YES/NO    Pneumonia treated and resolved: YES/N    This patient has a mobility limitation that significantly impairs the patients ability to participate in one or more MRADL's such as: toileting, eating, dressing and bathing in customary locations in the home. Patient's home provides adequate access between rooms for the use of the manual wheelchair. The wheelchair will significantly improve patient's ability to participate in MRADL's and will be used on a regular basis in the home. The patient's mobility limitation cannot be resolved by the use of a walker or cane. This patient does not have the upper extremity function to safely propel the manual wheelchair. Patient has a 24/7 care giver in the home who is willing to propel the wheelchair at any given time. The patient has agreed to use the wheelchair on a daily basis in the home. Diagnosis:      Physical Exam:   GENERAL APPEARANCE:  very frail, deconditioned, sick appearing, obese  T(C): 36.2 (12-03-23 @ 16:37), Max: 36.2 (12-03-23 @ 16:37)  HR: 95 (12-03-23 @ 21:56) (76 - 96)  BP: 118/67 (12-03-23 @ 21:56) (102/63 - 118/67)  RR: 19 (12-03-23 @ 21:56) (19 - 19)  SpO2: 98% (12-03-23 @ 21:56) (94% - 98%)  HEENT:  Head is normocephalic    Skin:  thin and dry  NECK:  Supple without lymphadenopathy.   HEART:  Regular rate and rhythm. normal S1 and S2, No M/R/G  LUNGS:  + ronchi  ABDOMEN:  Soft, nontender, nondistended with good bowel sounds heard  EXTREMITIES:  Without cyanosis, clubbing or edema.   NEUROLOGICAL:  Gross nonfocal        Pt is a 69 y/o Male with PMHx of Chronic resp failure 2/2 COPD (on 4-5L NC), chronic afib s/p watchman procedure (no AC due to hx GIB), HFpEF, PVD s/p L BKA, alcoholic liver cirrhosis, DT s/p trach now decannulated, HTN, VIJAY (noncompliant w/ CPAP), alcoholism, severe pulmonary HTN, morbid obesity, recent admission to  discharged on 11/29 for acute CVA (on DAPT), acute COPD exacerbation (discharged on prednisone taper), who presented to the ED < 24 hours after hospital discharge with complaints of severe dyspnea.    In the ED he was tachycardic to ~ 200 bpm and in respiratory distress; treated with BipAP and IV diltiazem. 11/30: On bipap, cardizem drip for rate control. + flu. Given IV cefepime/doxycycline for superimposed bacterial pna coverage. Admitted initially to ICU for further management.     Patient is comfortable. on HOme dose O2. Care discussed with Pulmonary -  patient is high risk of re-admission.       Despite IV steroids and bronchodilators patient desaturates.    - Room air pulse ox while ambulating: < 88%    - Pulse ox while ambulating on 3.5 liters n/c  O2 96%    Patient will require home O2 for discharge.  Patient is aware and agreeable to home O2.  Patient is in a chronic stable state of COPD.      Patient treated for pneumonia: YES/NO    Pneumonia treated and resolved: YES/N    This patient has a mobility limitation that significantly impairs the patients ability to participate in one or more MRADL's such as: toileting, eating, dressing and bathing in customary locations in the home. Patient's home provides adequate access between rooms for the use of the manual wheelchair. The wheelchair will significantly improve patient's ability to participate in MRADL's and will be used on a regular basis in the home. The patient's mobility limitation cannot be resolved by the use of a walker or cane. This patient does not have the upper extremity function to safely propel the manual wheelchair. Patient has a 24/7 care giver in the home who is willing to propel the wheelchair at any given time. The patient has agreed to use the wheelchair on a daily basis in the home. Diagnosis:      Physical Exam:   GENERAL APPEARANCE:  very frail, deconditioned, sick appearing, obese  T(C): 36.2 (12-03-23 @ 16:37), Max: 36.2 (12-03-23 @ 16:37)  HR: 95 (12-03-23 @ 21:56) (76 - 96)  BP: 118/67 (12-03-23 @ 21:56) (102/63 - 118/67)  RR: 19 (12-03-23 @ 21:56) (19 - 19)  SpO2: 98% (12-03-23 @ 21:56) (94% - 98%)  HEENT:  Head is normocephalic    Skin:  thin and dry  NECK:  Supple without lymphadenopathy.   HEART:  Regular rate and rhythm. normal S1 and S2, No M/R/G  LUNGS:  + ronchi  ABDOMEN:  Soft, nontender, nondistended with good bowel sounds heard  EXTREMITIES:  Without cyanosis, clubbing or edema.   NEUROLOGICAL:  Gross nonfocal        Pt is a 67 y/o Male with PMHx of Chronic resp failure 2/2 COPD (on 4-5L NC), chronic afib s/p watchman procedure (no AC due to hx GIB), HFpEF, PVD s/p L BKA, alcoholic liver cirrhosis, DT s/p trach now decannulated, HTN, VIJAY (noncompliant w/ CPAP), alcoholism, severe pulmonary HTN, morbid obesity, recent admission to  discharged on 11/29 for acute CVA (on DAPT), acute COPD exacerbation (discharged on prednisone taper), who presented to the ED < 24 hours after hospital discharge with complaints of severe dyspnea.    In the ED he was tachycardic to ~ 200 bpm and in respiratory distress; treated with BipAP and IV diltiazem. 11/30: On bipap, cardizem drip for rate control. + flu. Given IV cefepime/doxycycline for superimposed bacterial pna coverage. Admitted initially to ICU for further management.     Patient is comfortable. on HOme dose O2. Care discussed with Pulmonary -  patient is high risk of re-admission.       Despite IV steroids and bronchodilators patient desaturates.    - Room air pulse ox while ambulating: < 88%    - Pulse ox while ambulating on 3.5 liters n/c  O2 96%    Patient will require home O2 for discharge.  Patient is aware and agreeable to home O2.  Patient is in a chronic stable state of COPD.      Patient treated for pneumonia: YES/NO    Pneumonia treated and resolved: YES/N    This patient has a mobility limitation that significantly impairs the patients ability to participate in one or more MRADL's such as: toileting, eating, dressing and bathing in customary locations in the home. Patient's home provides adequate access between rooms for the use of the manual wheelchair. The wheelchair will significantly improve patient's ability to participate in MRADL's and will be used on a regular basis in the home. The patient's mobility limitation cannot be resolved by the use of a walker or cane.  This patient does not have the upper extremity function to safely propel the manual heavey duty wheelchair.Patient has a 24/7 care giver in the home who is willing to propel the heavey duty  wheelchair at any given time. The patient has agreed to use the wheelchair on a daily basis in the home. Diagnosis: severe obesity /  amputations      Physical Exam:   GENERAL APPEARANCE:  very frail, deconditioned, sick appearing, obese  T(C): 36.2 (12-03-23 @ 16:37), Max: 36.2 (12-03-23 @ 16:37)  HR: 95 (12-03-23 @ 21:56) (76 - 96)  BP: 118/67 (12-03-23 @ 21:56) (102/63 - 118/67)  RR: 19 (12-03-23 @ 21:56) (19 - 19)  SpO2: 98% (12-03-23 @ 21:56) (94% - 98%)  HEENT:  Head is normocephalic    Skin:  thin and dry  NECK:  Supple without lymphadenopathy.   HEART:  Regular rate and rhythm. normal S1 and S2, No M/R/G  LUNGS:  + ronchi  ABDOMEN:  Soft, nontender, nondistended with good bowel sounds heard  EXTREMITIES:  le amputation  NEUROLOGICAL:  Gross nonfocal        Pt is a 69 y/o Male with PMHx of Chronic resp failure 2/2 COPD (on 4-5L NC), chronic afib s/p watchman procedure (no AC due to hx GIB), HFpEF, PVD s/p L BKA, alcoholic liver cirrhosis, DT s/p trach now decannulated, HTN, VIJAY (noncompliant w/ CPAP), alcoholism, severe pulmonary HTN, morbid obesity, recent admission to  discharged on 11/29 for acute CVA (on DAPT), acute COPD exacerbation (discharged on prednisone taper), who presented to the ED < 24 hours after hospital discharge with complaints of severe dyspnea.    In the ED he was tachycardic to ~ 200 bpm and in respiratory distress; treated with BipAP and IV diltiazem. 11/30: On bipap, cardizem drip for rate control. + flu. Given IV cefepime/doxycycline for superimposed bacterial pna coverage. Admitted initially to ICU for further management.     Patient is comfortable. on HOme dose O2. Care discussed with Pulmonary -  patient is high risk of re-admission.       Despite IV steroids and bronchodilators patient desaturates.    - Room air pulse ox while ambulating: < 88%    - Pulse ox while ambulating on 3.5 liters n/c  O2 96%    Patient will require home O2 for discharge.  Patient is aware and agreeable to home O2.  Patient is in a chronic stable state of COPD.      Patient treated for pneumonia: YES/NO    Pneumonia treated and resolved: YES/N    This patient has a mobility limitation that significantly impairs the patients ability to participate in one or more MRADL's such as: toileting, eating, dressing and bathing in customary locations in the home. Patient's home provides adequate access between rooms for the use of the manual wheelchair. The wheelchair will significantly improve patient's ability to participate in MRADL's and will be used on a regular basis in the home. The patient's mobility limitation cannot be resolved by the use of a walker or cane.  This patient does not have the upper extremity function to safely propel the manual heavey duty wheelchair.Patient has a 24/7 care giver in the home who is willing to propel the heavey duty  wheelchair at any given time. The patient has agreed to use the wheelchair on a daily basis in the home. Diagnosis: severe obesity /  amputations      Physical Exam:   GENERAL APPEARANCE:  very frail, deconditioned, sick appearing, obese  T(C): 36.2 (12-03-23 @ 16:37), Max: 36.2 (12-03-23 @ 16:37)  HR: 95 (12-03-23 @ 21:56) (76 - 96)  BP: 118/67 (12-03-23 @ 21:56) (102/63 - 118/67)  RR: 19 (12-03-23 @ 21:56) (19 - 19)  SpO2: 98% (12-03-23 @ 21:56) (94% - 98%)  HEENT:  Head is normocephalic    Skin:  thin and dry  NECK:  Supple without lymphadenopathy.   HEART:  Regular rate and rhythm. normal S1 and S2, No M/R/G  LUNGS:  + ronchi  ABDOMEN:  Soft, nontender, nondistended with good bowel sounds heard  EXTREMITIES:  le amputation  NEUROLOGICAL:  Gross nonfocal        Pt is a 69 y/o Male with PMHx of Chronic resp failure 2/2 COPD (on 4-5L NC), chronic afib s/p watchman procedure (no AC due to hx GIB), HFpEF, PVD s/p L BKA, alcoholic liver cirrhosis, DT s/p trach now decannulated, HTN, VIJAY (noncompliant w/ CPAP), alcoholism, severe pulmonary HTN, morbid obesity, recent admission to  discharged on 11/29 for acute CVA (on DAPT), acute COPD exacerbation (discharged on prednisone taper), who presented to the ED < 24 hours after hospital discharge with complaints of severe dyspnea.    In the ED he was tachycardic to ~ 200 bpm and in respiratory distress; treated with BipAP and IV diltiazem. 11/30: On bipap, cardizem drip for rate control. + flu. Given IV cefepime/doxycycline for superimposed bacterial pna coverage. Admitted initially to ICU for further management.     Patient is comfortable. on HOme dose O2. Care discussed with Pulmonary -  patient is high risk of re-admission.       Despite IV steroids and bronchodilators patient desaturates.    - Room air pulse ox while ambulating: < 88%    - Pulse ox while ambulating on 3.5 liters n/c  O2 96%    Patient will require home O2 for discharge.  Patient is aware and agreeable to home O2.  Patient is in a chronic stable state of COPD.      Patient treated for pneumonia: YES/NO    Pneumonia treated and resolved: YES/N    This patient has a mobility limitation that significantly impairs the patients ability to participate in one or more MRADL's such as: toileting, eating, dressing and bathing in customary locations in the home. Patient's home provides adequate access between rooms for the use of the manual wheelchair. The wheelchair will significantly improve patient's ability to participate in MRADL's and will be used on a regular basis in the home. The patient's mobility limitation cannot be resolved by the use of a walker or cane.  This patient does not have the upper extremity function to safely propel the manual heavy duty wheelchair.Patient has a 24/7 care giver in the home who is willing to propel the heavy duty  wheelchair at any given time. The patient has agreed to use the wheelchair on a daily basis in the home. Diagnosis: severe obesity /  amputations      Physical Exam:   GENERAL APPEARANCE:  very frail, deconditioned, sick appearing, obese  T(C): 36.2 (12-03-23 @ 16:37), Max: 36.2 (12-03-23 @ 16:37)  HR: 95 (12-03-23 @ 21:56) (76 - 96)  BP: 118/67 (12-03-23 @ 21:56) (102/63 - 118/67)  RR: 19 (12-03-23 @ 21:56) (19 - 19)  SpO2: 98% (12-03-23 @ 21:56) (94% - 98%)  HEENT:  Head is normocephalic    Skin:  thin and dry  NECK:  Supple without lymphadenopathy.   HEART:  Regular rate and rhythm. normal S1 and S2, No M/R/G  LUNGS:  + ronchi  ABDOMEN:  Soft, nontender, nondistended with good bowel sounds heard  EXTREMITIES:  le amputation  NEUROLOGICAL:  Gross nonfocal        Pt is a 69 y/o Male with PMHx of Chronic resp failure 2/2 COPD (on 4-5L NC), chronic afib s/p watchman procedure (no AC due to hx GIB), HFpEF, PVD s/p L BKA, alcoholic liver cirrhosis, DT s/p trach now decannulated, HTN, VIJAY (noncompliant w/ CPAP), alcoholism, severe pulmonary HTN, morbid obesity, recent admission to  discharged on 11/29 for acute CVA (on DAPT), acute COPD exacerbation (discharged on prednisone taper), who presented to the ED < 24 hours after hospital discharge with complaints of severe dyspnea.    In the ED he was tachycardic to ~ 200 bpm and in respiratory distress; treated with BipAP and IV diltiazem. 11/30: On bipap, cardizem drip for rate control. + flu. Given IV cefepime/doxycycline for superimposed bacterial pna coverage. Admitted initially to ICU for further management.     Patient is sitting up. Denies any HA, CP, SOB on 3 L of O2. Comfortable. tolerating diet. Denies any HA, CP, SOB. No fevers, chills or shakes. Labs and vitals reviewed.       Despite IV steroids and bronchodilators patient desaturates.    - Room air pulse ox while ambulating: < 88%    - Pulse ox while ambulating on 3.5 liters n/c  O2 96%    Patient will require home O2 for discharge.  Patient is aware and agreeable to home O2.  Patient is in a chronic stable state of COPD.      Patient treated for pneumonia: YES/NO    Pneumonia treated and resolved: YES/N    This patient has a mobility limitation that significantly impairs the patients ability to participate in one or more MRADL's such as: toileting, eating, dressing and bathing in customary locations in the home. Patient's home provides adequate access between rooms for the use of the manual wheelchair. The wheelchair will significantly improve patient's ability to participate in MRADL's and will be used on a regular basis in the home. The patient's mobility limitation cannot be resolved by the use of a walker or cane.  This patient does not have the upper extremity function to safely propel the manual heavy duty wheelchair.Patient has a 24/7 care giver in the home who is willing to propel the heavy duty  wheelchair at any given time. The patient has agreed to use the wheelchair on a daily basis in the home. Diagnosis: severe obesity /  amputations      Physical Exam:   GENERAL APPEARANCE:  very frail, deconditioned, sick appearing, obese  ICU Vital Signs Last 24 Hrs  T(C): 36.3 (06 Dec 2023 08:15), Max: 36.8 (05 Dec 2023 23:25)  T(F): 97.4 (06 Dec 2023 08:15), Max: 98.2 (05 Dec 2023 23:25)  HR: 71 (06 Dec 2023 08:15) (71 - 92)  BP: 108/46 (06 Dec 2023 08:15) (108/46 - 119/50)  RR: 18 (06 Dec 2023 08:15) (18 - 19)  SpO2: 97% (06 Dec 2023 08:15) (97% - 100%)  HEENT:  Head is normocephalic    Skin:  thin and dry  NECK:  Supple without lymphadenopathy.   HEART:  Regular rate and rhythm. normal S1 and S2, No M/R/G  LUNGS:  + ronchi  ABDOMEN:  Soft, nontender, nondistended with good bowel sounds heard  EXTREMITIES:  le amputation  NEUROLOGICAL:  Gross nonfocal        Pt is a 67 y/o Male with PMHx of Chronic resp failure 2/2 COPD (on 4-5L NC), chronic afib s/p watchman procedure (no AC due to hx GIB), HFpEF, PVD s/p L BKA, alcoholic liver cirrhosis, DT s/p trach now decannulated, HTN, VIJAY (noncompliant w/ CPAP), alcoholism, severe pulmonary HTN, morbid obesity, recent admission to  discharged on 11/29 for acute CVA (on DAPT), acute COPD exacerbation (discharged on prednisone taper), who presented to the ED < 24 hours after hospital discharge with complaints of severe dyspnea.    In the ED he was tachycardic to ~ 200 bpm and in respiratory distress; treated with BipAP and IV diltiazem. 11/30: On bipap, cardizem drip for rate control. + flu. Given IV cefepime/doxycycline for superimposed bacterial pna coverage. Admitted initially to ICU for further management.     Patient is sitting up. Denies any HA, CP, SOB on 3 L of O2. Comfortable. tolerating diet. Denies any HA, CP, SOB. No fevers, chills or shakes. Labs and vitals reviewed.       Despite IV steroids and bronchodilators patient desaturates.    - Room air pulse ox while ambulating: < 88%    - Pulse ox while ambulating on 3.5 liters n/c  O2 96%    Patient will require home O2 for discharge.  Patient is aware and agreeable to home O2.  Patient is in a chronic stable state of COPD.      Patient treated for pneumonia: YES/NO    Pneumonia treated and resolved: YES/N    This patient has a mobility limitation that significantly impairs the patients ability to participate in one or more MRADL's such as: toileting, eating, dressing and bathing in customary locations in the home. Patient's home provides adequate access between rooms for the use of the manual wheelchair. The wheelchair will significantly improve patient's ability to participate in MRADL's and will be used on a regular basis in the home. The patient's mobility limitation cannot be resolved by the use of a walker or cane.  This patient does not have the upper extremity function to safely propel the manual heavy duty wheelchair.Patient has a 24/7 care giver in the home who is willing to propel the heavy duty  wheelchair at any given time. The patient has agreed to use the wheelchair on a daily basis in the home. Diagnosis: severe obesity /  amputations      Physical Exam:   GENERAL APPEARANCE:  very frail, deconditioned, sick appearing, obese  ICU Vital Signs Last 24 Hrs  T(C): 36.3 (06 Dec 2023 08:15), Max: 36.8 (05 Dec 2023 23:25)  T(F): 97.4 (06 Dec 2023 08:15), Max: 98.2 (05 Dec 2023 23:25)  HR: 71 (06 Dec 2023 08:15) (71 - 92)  BP: 108/46 (06 Dec 2023 08:15) (108/46 - 119/50)  RR: 18 (06 Dec 2023 08:15) (18 - 19)  SpO2: 97% (06 Dec 2023 08:15) (97% - 100%)  HEENT:  Head is normocephalic    Skin:  thin and dry  NECK:  Supple without lymphadenopathy.   HEART:  Regular rate and rhythm. normal S1 and S2, No M/R/G  LUNGS:  + ronchi  ABDOMEN:  Soft, nontender, nondistended with good bowel sounds heard  EXTREMITIES:  le amputation  NEUROLOGICAL:  Gross nonfocal

## 2023-12-03 NOTE — DISCHARGE NOTE PROVIDER - NSDCFUSCHEDAPPT_GEN_ALL_CORE_FT
Dominick Middleton  [facility] Physician [facility]  CARDIOLOGY [address]  Scheduled Appointment: 12/05/2023    Elizabeth Garcia  [facility] Physician [facility]  COLOSURG [address] [facility]  Scheduled Appointment: 12/11/2023    Ken Oliva  [facility] Physician [facility]  HEARTFAIL [address]  Scheduled Appointment: 01/02/2024    Wicho Mckenna  [facility] Physician [facility]  INTMED [address]  Scheduled Appointment: 01/04/2024    Augusta Juarez  [facility] Physician [facility]  FAMILYMED [address]  Scheduled Appointment: 01/08/2024     Dominick Middleton  [facility] Physician [facility]  CARDIOLOGY [address]  Scheduled Appointment: 12/05/2023    Elizabeth Garcia  [facility] Physician [facility]  COLOSURG [address] [facility]  Scheduled Appointment: 12/11/2023    Ken Oliva  [facility] Physician [facility]  HEARTFAIL [address]  Scheduled Appointment: 01/02/2024    Wicho Mckenna  [facility] Physician [facility]  INTMED [address]  Scheduled Appointment: 01/04/2024    Augusta Juarez  [facility] Physician [facility]  FAMILYMED [address]  Scheduled Appointment: 01/08/2024     Dominick Middleton  [facility] Physician [facility]  CARDIOLOGY [address]  Scheduled Appointment: 12/05/2023    Elizabeth Garcia  [facility] Physician [facility]  COLOSURG [address] [facility]  Scheduled Appointment: 12/11/2023    Ken Oliva  [facility] Physician [facility]  HEARTFAIL [address]  Scheduled Appointment: 01/02/2024    Wicho Mckenna  [facility] Physician [facility]  INTMED [address]  Scheduled Appointment: 01/04/2024    Augusta Juarez  [facility] Physician [facility]  FAMILYMED [address]  Scheduled Appointment: 01/08/2024     Dominick Middleton  [facility] Physician [facility]  CARDIOLOGY [address]  Scheduled Appointment: 12/05/2023    Ariana Padilla  [facility]  INTMED [address]  Scheduled Appointment: 12/07/2023    Elizabeth Garcia  [facility] Physician [facility]  COLOSURG [address] [facility]  Scheduled Appointment: 12/11/2023    Ken Oliva  [facility] Physician [facility]  HEARTFAIL [address]  Scheduled Appointment: 01/02/2024    Wicho Mckenna  [facility]  INTMED [address]  Scheduled Appointment: 01/04/2024    Augusta Juarez  [facility]  FAMILYMED [address]  Scheduled Appointment: 01/08/2024     Dominick Middleton  [facility] Physician [facility]  CARDIOLOGY [address]  Scheduled Appointment: 12/05/2023    Ariana Padilla  [facility]  INTMED [address]  Scheduled Appointment: 12/07/2023    Elizabeth Garcia  [facility] Physician [facility]  COLOSURG [address] [facility]  Scheduled Appointment: 12/11/2023    Ken Oliva  [facility] Physician [facility]  HEARTFAIL [address]  Scheduled Appointment: 01/02/2024    Wicho Mckenna  [facility]  INTMED [address]  Scheduled Appointment: 01/04/2024    Augusta Juarez  [facility]  FAMILYMED [address]  Scheduled Appointment: 01/08/2024     Dominick Middleton  [facility] Physician [facility]  CARDIOLOGY [address]  Scheduled Appointment: 12/05/2023    Ariana Padilla  [facility]  INTMED [address]  Scheduled Appointment: 12/07/2023    Elizabeth Garcia  [facility] Physician [facility]  COLOSURG [address] [facility]  Scheduled Appointment: 12/11/2023    Ken Oliva  [facility] Physician [facility]  HEARTFAIL [address]  Scheduled Appointment: 01/02/2024    Wicho Mckenna  [facility]  INTMED [address]  Scheduled Appointment: 01/04/2024    Augusta Juarez  [facility]  FAMILYMED [address]  Scheduled Appointment: 01/08/2024     Ariana Padilla  [facility] Physician [facility]  INTMED [address]  Scheduled Appointment: 12/07/2023    Elizabeth Garcia  [facility]  COLOSURG [address] [facility]  Scheduled Appointment: 12/11/2023    Ken Oliva  [facility] Physician [facility]  HEARTFAIL [address]  Scheduled Appointment: 01/02/2024    Wicho Mckenna  [facility] Physician [facility]  INTMED [address]  Scheduled Appointment: 01/04/2024    Augusta Juarez  [facility] Physician [facility]  FAMILYMED [address]  Scheduled Appointment: 01/08/2024     Ariana Padilla  [facility] Physician [facility]  INTMED [address]  Scheduled Appointment: 12/07/2023    Elizabeth Garcia  [facility]  COLOSURG [address] [facility]  Scheduled Appointment: 12/11/2023    Ken Oliva  [facility] Physician [facility]  HEARTFAIL [address]  Scheduled Appointment: 01/02/2024    Wicho Mckenna  [facility] Physician [facility]  INTMED [address]  Scheduled Appointment: 01/04/2024    Augusta Juarez  [facility] Physician [facility]  FAMILYMED [address]  Scheduled Appointment: 01/08/2024     Ariana Padilla  [facility] Physician [facility]  INTMED [address]  Scheduled Appointment: 12/07/2023    Elizabeth Garcia  [facility]  COLOSURG [address] [facility]  Scheduled Appointment: 12/11/2023    Ken Oliva  [facility] Physician [facility]  HEARTFAIL [address]  Scheduled Appointment: 01/02/2024    Wicho Mckenna  [facility] Physician [facility]  INTMED [address]  Scheduled Appointment: 01/04/2024    Augusta Juarez  [facility] Physician [facility]  FAMILYMED [address]  Scheduled Appointment: 01/08/2024     Elizabeth Garcia  [facility]  COLOSURG [address] [facility]  Scheduled Appointment: 12/11/2023    Lucretia Rodriguez  [facility] Physician [facility]  INTMED [address]  Scheduled Appointment: 12/13/2023    Ken Oliva  [facility] Physician [facility]  HEARTFAIL [address]  Scheduled Appointment: 01/02/2024    Wicho Mckenna  [facility] Physician [facility]  INTMED [address]  Scheduled Appointment: 01/04/2024    Augusta Juarez  [facility] Physician [facility]  FAMILYMED [address]  Scheduled Appointment: 01/08/2024     Elizabeth Garcia  [facility]  COLOSURG [address] [facility]  Scheduled Appointment: 12/11/2023    Lucretia Rodriguez  [facility] Physician [facility]  INTMED [address]  Scheduled Appointment: 12/13/2023    Ken Oliva  [facility] Physician [facility]  HEARTFAIL [address]  Scheduled Appointment: 01/02/2024    Wicho Mckenna  [facility] Physician [facility]  INTMED [address]  Scheduled Appointment: 01/04/2024    Augusta Juarez  [facility] Physician [facility]  FAMILYMED [address]  Scheduled Appointment: 01/08/2024     Elizabeth Garcia  [facility]  COLOSURG [address] [facility]  Scheduled Appointment: 12/11/2023    Lucretia Rodriguez  [facility] Physician [facility]  INTMED [address]  Scheduled Appointment: 12/13/2023    Ken Oliva  [facility]ician [facility]  HEARTFAIL [address]  Scheduled Appointment: 01/02/2024    Wicho Mckenna  [facility]ician [facility]  INTMED [address]  Scheduled Appointment: 01/04/2024    Augusta Juarez  [facility] Physician [facility]  FAMILYMED [address]  Scheduled Appointment: 01/08/2024

## 2023-12-03 NOTE — DISCHARGE NOTE PROVIDER - NSDCCPCAREPLAN_GEN_ALL_CORE_FT
PRINCIPAL DISCHARGE DIAGNOSIS  Diagnosis: Acute respiratory failure with hypoxia  Assessment and Plan of Treatment: - secondary to flue in a setting of superimposed bacaterial pneumonia  WHAT IS PNEUMONIA? Pneumonia (PNA) is a lung infection caused by bacteria which makes your lungs inflamed.  THINGS TO DO: (1) Rest as needed (2) Do not smoke – smoking increases your risk for pneumonia (3) Prevent the spread of germs – wash your hands often and cover your mouth when you cough (4) Ask about vaccines with your primary care provider  MONITOR THESE SIGNS AND SYMPTOMS: (1) Worsening confusion (2) Worsening/trouble breathing (3) Fever > 100.4. If you experience any of these, DO alert your primary care provider, or return to the Emergency Department if you feel very sick.         SECONDARY DISCHARGE DIAGNOSES  Diagnosis: Atrial fibrillation, rapid  Assessment and Plan of Treatment: # Permanent atrial fibrillation RVR s/p watchman procedure  WHAT IS ATRIAL FIBRILLATION? Atrial fibrillation (AFIB) is a cardiac arrhythmia caused by a disorder in the hearts electrical system. An arrhythmia is a problem with the speed or rhythm of the heartbeat. Atrial fibrillation is the most common type of arrhythmia.  THINGS TO DO: (1) Monitor your blood pressure and heart rate (2) Limit or avoid alcohol intake – alcohol can increase your risk of AFIB (3) Do not smoke – nicotine can damage your heart and make it difficult to manage your AFIB (4) Eat heart healthy foods like fruits, vegetables, and whole grains (5) Manage a healthy weight (5) Get regular physical activity  MONITOR THESE SIGNS AND SYMPTOMS: (1) Shortness of breath (2) Nausea or vomiting (3) Chest pain or pressure (4) Chest palpitations. If you experience any of these, DO alert your primary care provider, or return to the Emergency Department if you feel very sick.    Diagnosis: Influenza A  Assessment and Plan of Treatment: - you were on tamiflu    Diagnosis: Morbidly obese  Assessment and Plan of Treatment: - supportive care  - weight loss     PRINCIPAL DISCHARGE DIAGNOSIS  Diagnosis: Acute respiratory failure with hypoxia  Assessment and Plan of Treatment: - secondary to flue in a setting of superimposed bacaterial pneumonia  WHAT IS PNEUMONIA? Pneumonia (PNA) is a lung infection caused by bacteria which makes your lungs inflamed.  THINGS TO DO: (1) Rest as needed (2) Do not smoke – smoking increases your risk for pneumonia (3) Prevent the spread of germs – wash your hands often and cover your mouth when you cough (4) Ask about vaccines with your primary care provider  MONITOR THESE SIGNS AND SYMPTOMS: (1) Worsening confusion (2) Worsening/trouble breathing (3) Fever > 100.4. If you experience any of these, DO alert your primary care provider, or return to the Emergency Department if you feel very sick.         SECONDARY DISCHARGE DIAGNOSES  Diagnosis: Atrial fibrillation, rapid  Assessment and Plan of Treatment: # Permanent atrial fibrillation RVR s/p watchman procedure  WHAT IS ATRIAL FIBRILLATION? Atrial fibrillation (AFIB) is a cardiac arrhythmia caused by a disorder in the hearts electrical system. An arrhythmia is a problem with the speed or rhythm of the heartbeat. Atrial fibrillation is the most common type of arrhythmia.  THINGS TO DO: (1) Monitor your blood pressure and heart rate (2) Limit or avoid alcohol intake – alcohol can increase your risk of AFIB (3) Do not smoke – nicotine can damage your heart and make it difficult to manage your AFIB (4) Eat heart healthy foods like fruits, vegetables, and whole grains (5) Manage a healthy weight (5) Get regular physical activity  MONITOR THESE SIGNS AND SYMPTOMS: (1) Shortness of breath (2) Nausea or vomiting (3) Chest pain or pressure (4) Chest palpitations. If you experience any of these, DO alert your primary care provider, or return to the Emergency Department if you feel very sick.    Diagnosis: Influenza A  Assessment and Plan of Treatment: - you were on tamiflu    Diagnosis: Morbidly obese  Assessment and Plan of Treatment: - supportive care  - weight loss    Diagnosis: Polypharmacy  Assessment and Plan of Treatment: - needs a medication reconciliation

## 2023-12-04 ENCOUNTER — TRANSCRIPTION ENCOUNTER (OUTPATIENT)
Age: 68
End: 2023-12-04

## 2023-12-04 LAB
BUN SERPL-MCNC: 15 MG/DL — SIGNIFICANT CHANGE UP (ref 7–23)
BUN SERPL-MCNC: 15 MG/DL — SIGNIFICANT CHANGE UP (ref 7–23)
CALCIUM SERPL-MCNC: 8.8 MG/DL — SIGNIFICANT CHANGE UP (ref 8.5–10.1)
CALCIUM SERPL-MCNC: 8.8 MG/DL — SIGNIFICANT CHANGE UP (ref 8.5–10.1)
CHLORIDE SERPL-SCNC: 105 MMOL/L — SIGNIFICANT CHANGE UP (ref 96–108)
CHLORIDE SERPL-SCNC: 105 MMOL/L — SIGNIFICANT CHANGE UP (ref 96–108)
CO2 SERPL-SCNC: 34 MMOL/L — HIGH (ref 22–31)
CO2 SERPL-SCNC: 34 MMOL/L — HIGH (ref 22–31)
CREAT SERPL-MCNC: 0.63 MG/DL — SIGNIFICANT CHANGE UP (ref 0.5–1.3)
CREAT SERPL-MCNC: 0.63 MG/DL — SIGNIFICANT CHANGE UP (ref 0.5–1.3)
EGFR: 105 ML/MIN/1.73M2 — SIGNIFICANT CHANGE UP
EGFR: 105 ML/MIN/1.73M2 — SIGNIFICANT CHANGE UP
GLUCOSE BLDC GLUCOMTR-MCNC: 115 MG/DL — HIGH (ref 70–99)
GLUCOSE BLDC GLUCOMTR-MCNC: 115 MG/DL — HIGH (ref 70–99)
GLUCOSE BLDC GLUCOMTR-MCNC: 119 MG/DL — HIGH (ref 70–99)
GLUCOSE BLDC GLUCOMTR-MCNC: 119 MG/DL — HIGH (ref 70–99)
GLUCOSE BLDC GLUCOMTR-MCNC: 129 MG/DL — HIGH (ref 70–99)
GLUCOSE BLDC GLUCOMTR-MCNC: 129 MG/DL — HIGH (ref 70–99)
GLUCOSE BLDC GLUCOMTR-MCNC: 93 MG/DL — SIGNIFICANT CHANGE UP (ref 70–99)
GLUCOSE BLDC GLUCOMTR-MCNC: 93 MG/DL — SIGNIFICANT CHANGE UP (ref 70–99)
GLUCOSE SERPL-MCNC: 96 MG/DL — SIGNIFICANT CHANGE UP (ref 70–99)
GLUCOSE SERPL-MCNC: 96 MG/DL — SIGNIFICANT CHANGE UP (ref 70–99)
HCT VFR BLD CALC: 33.2 % — LOW (ref 39–50)
HCT VFR BLD CALC: 33.2 % — LOW (ref 39–50)
HGB BLD-MCNC: 10.4 G/DL — LOW (ref 13–17)
HGB BLD-MCNC: 10.4 G/DL — LOW (ref 13–17)
MCHC RBC-ENTMCNC: 28.1 PG — SIGNIFICANT CHANGE UP (ref 27–34)
MCHC RBC-ENTMCNC: 28.1 PG — SIGNIFICANT CHANGE UP (ref 27–34)
MCHC RBC-ENTMCNC: 31.3 GM/DL — LOW (ref 32–36)
MCHC RBC-ENTMCNC: 31.3 GM/DL — LOW (ref 32–36)
MCV RBC AUTO: 89.7 FL — SIGNIFICANT CHANGE UP (ref 80–100)
MCV RBC AUTO: 89.7 FL — SIGNIFICANT CHANGE UP (ref 80–100)
PLATELET # BLD AUTO: 122 K/UL — LOW (ref 150–400)
PLATELET # BLD AUTO: 122 K/UL — LOW (ref 150–400)
POTASSIUM SERPL-MCNC: 4.1 MMOL/L — SIGNIFICANT CHANGE UP (ref 3.5–5.3)
POTASSIUM SERPL-MCNC: 4.1 MMOL/L — SIGNIFICANT CHANGE UP (ref 3.5–5.3)
POTASSIUM SERPL-SCNC: 4.1 MMOL/L — SIGNIFICANT CHANGE UP (ref 3.5–5.3)
POTASSIUM SERPL-SCNC: 4.1 MMOL/L — SIGNIFICANT CHANGE UP (ref 3.5–5.3)
RBC # BLD: 3.7 M/UL — LOW (ref 4.2–5.8)
RBC # BLD: 3.7 M/UL — LOW (ref 4.2–5.8)
RBC # FLD: 15.8 % — HIGH (ref 10.3–14.5)
RBC # FLD: 15.8 % — HIGH (ref 10.3–14.5)
SODIUM SERPL-SCNC: 140 MMOL/L — SIGNIFICANT CHANGE UP (ref 135–145)
SODIUM SERPL-SCNC: 140 MMOL/L — SIGNIFICANT CHANGE UP (ref 135–145)
WBC # BLD: 8.48 K/UL — SIGNIFICANT CHANGE UP (ref 3.8–10.5)
WBC # BLD: 8.48 K/UL — SIGNIFICANT CHANGE UP (ref 3.8–10.5)
WBC # FLD AUTO: 8.48 K/UL — SIGNIFICANT CHANGE UP (ref 3.8–10.5)
WBC # FLD AUTO: 8.48 K/UL — SIGNIFICANT CHANGE UP (ref 3.8–10.5)

## 2023-12-04 PROCEDURE — 99233 SBSQ HOSP IP/OBS HIGH 50: CPT

## 2023-12-04 RX ORDER — SPIRONOLACTONE 25 MG/1
50 TABLET, FILM COATED ORAL DAILY
Refills: 0 | Status: DISCONTINUED | OUTPATIENT
Start: 2023-12-04 | End: 2023-12-06

## 2023-12-04 RX ORDER — CEFUROXIME AXETIL 250 MG
1 TABLET ORAL
Qty: 14 | Refills: 0
Start: 2023-12-04 | End: 2023-12-10

## 2023-12-04 RX ADMIN — Medication 1000 UNIT(S): at 10:12

## 2023-12-04 RX ADMIN — PANTOPRAZOLE SODIUM 40 MILLIGRAM(S): 20 TABLET, DELAYED RELEASE ORAL at 06:02

## 2023-12-04 RX ADMIN — Medication 40 MILLIGRAM(S): at 10:11

## 2023-12-04 RX ADMIN — ALBUTEROL 2 PUFF(S): 90 AEROSOL, METERED ORAL at 21:15

## 2023-12-04 RX ADMIN — Medication 500 MILLIGRAM(S): at 22:35

## 2023-12-04 RX ADMIN — TIOTROPIUM BROMIDE 2 PUFF(S): 18 CAPSULE ORAL; RESPIRATORY (INHALATION) at 08:01

## 2023-12-04 RX ADMIN — Medication 1 MILLIGRAM(S): at 22:34

## 2023-12-04 RX ADMIN — QUETIAPINE FUMARATE 100 MILLIGRAM(S): 200 TABLET, FILM COATED ORAL at 22:35

## 2023-12-04 RX ADMIN — CLOPIDOGREL BISULFATE 75 MILLIGRAM(S): 75 TABLET, FILM COATED ORAL at 10:12

## 2023-12-04 RX ADMIN — BUDESONIDE AND FORMOTEROL FUMARATE DIHYDRATE 2 PUFF(S): 160; 4.5 AEROSOL RESPIRATORY (INHALATION) at 21:15

## 2023-12-04 RX ADMIN — Medication 75 MILLIGRAM(S): at 10:12

## 2023-12-04 RX ADMIN — Medication 100 MILLIGRAM(S): at 10:12

## 2023-12-04 RX ADMIN — GABAPENTIN 400 MILLIGRAM(S): 400 CAPSULE ORAL at 06:03

## 2023-12-04 RX ADMIN — Medication 180 MILLIGRAM(S): at 10:12

## 2023-12-04 RX ADMIN — Medication 1 MILLIGRAM(S): at 10:12

## 2023-12-04 RX ADMIN — Medication 75 MILLIGRAM(S): at 22:35

## 2023-12-04 RX ADMIN — BUDESONIDE AND FORMOTEROL FUMARATE DIHYDRATE 2 PUFF(S): 160; 4.5 AEROSOL RESPIRATORY (INHALATION) at 08:01

## 2023-12-04 RX ADMIN — PREGABALIN 1000 MICROGRAM(S): 225 CAPSULE ORAL at 10:12

## 2023-12-04 RX ADMIN — Medication 10 MILLIGRAM(S): at 22:35

## 2023-12-04 RX ADMIN — ENOXAPARIN SODIUM 40 MILLIGRAM(S): 100 INJECTION SUBCUTANEOUS at 22:39

## 2023-12-04 RX ADMIN — GABAPENTIN 400 MILLIGRAM(S): 400 CAPSULE ORAL at 22:38

## 2023-12-04 RX ADMIN — Medication 100 MILLIGRAM(S): at 22:35

## 2023-12-04 RX ADMIN — Medication 10 MILLIGRAM(S): at 10:11

## 2023-12-04 RX ADMIN — ATORVASTATIN CALCIUM 80 MILLIGRAM(S): 80 TABLET, FILM COATED ORAL at 22:39

## 2023-12-04 RX ADMIN — Medication 1 TABLET(S): at 10:11

## 2023-12-04 RX ADMIN — GABAPENTIN 400 MILLIGRAM(S): 400 CAPSULE ORAL at 13:04

## 2023-12-04 RX ADMIN — Medication 100 MILLIGRAM(S): at 10:11

## 2023-12-04 RX ADMIN — Medication 25 MILLIGRAM(S): at 10:12

## 2023-12-04 RX ADMIN — Medication 25 MILLIGRAM(S): at 22:38

## 2023-12-04 RX ADMIN — Medication 500 MILLIGRAM(S): at 10:11

## 2023-12-04 RX ADMIN — ENOXAPARIN SODIUM 40 MILLIGRAM(S): 100 INJECTION SUBCUTANEOUS at 10:12

## 2023-12-04 RX ADMIN — SPIRONOLACTONE 50 MILLIGRAM(S): 25 TABLET, FILM COATED ORAL at 10:14

## 2023-12-04 RX ADMIN — Medication 81 MILLIGRAM(S): at 10:11

## 2023-12-04 NOTE — DISCHARGE NOTE NURSING/CASE MANAGEMENT/SOCIAL WORK - NSTRANSFERBELONGINGSDISPO_GEN_A_NUR
Please take daily prednisone as directed with food x 7 days  May administer daily cetirizine 5 mL to control itching  If nighttime itching persistent may administer 5 mL dose at bedtime as needed  Hydrate adequately  Follow up as needed for any persistent or worsening symptoms  May follow up in 2 weeks for formal food and environmental allergen blood work testing  with patient

## 2023-12-04 NOTE — DISCHARGE NOTE NURSING/CASE MANAGEMENT/SOCIAL WORK - NSDCVIVACCINE_GEN_ALL_CORE_FT
influenza, injectable, quadrivalent, preservative free; 21-Nov-2019 14:53; Cathryn Burks (RN); [employer]; [id number] (Exp. Date: 30-Jun-2020); IntraMuscular; Deltoid Left.; 0.5 milliLiter(s); VIS (VIS Published: 15-Aug-2019, VIS Presented: 21-Nov-2019);   influenza, injectable, quadrivalent, preservative free; 17-Oct-2020 16:13; Bhavya Brown (RN); [employer]; [id number] (Exp. Date: 30-Jun-2021); IntraMuscular; Deltoid Left.; 0.5 milliLiter(s); VIS (VIS Published: 15-Aug-2019, VIS Presented: 17-Oct-2020);    influenza, injectable, quadrivalent, preservative free; 21-Nov-2019 14:53; Cathryn Burks (RN); [email or web address]; [id number] (Exp. Date: 30-Jun-2020); IntraMuscular; Deltoid Left.; 0.5 milliLiter(s); VIS (VIS Published: 15-Aug-2019, VIS Presented: 21-Nov-2019);   influenza, injectable, quadrivalent, preservative free; 17-Oct-2020 16:13; Bhavya Brown (RN); [employer]; [id number] (Exp. Date: 30-Jun-2021); IntraMuscular; Deltoid Left.; 0.5 milliLiter(s); VIS (VIS Published: 15-Aug-2019, VIS Presented: 17-Oct-2020);

## 2023-12-04 NOTE — DISCHARGE NOTE NURSING/CASE MANAGEMENT/SOCIAL WORK - NSDCPEFALRISK_GEN_ALL_CORE
For information on Fall & Injury Prevention, visit: [email or web address] OR  [email or web address] OR  https://www.cdc.gov/steadi/patient.html For information on Fall & Injury Prevention, visit: [email or web address] OR  [email or web address]-[email or web address]-[email or web address] OR  https://www.cdc.gov/steadi/patient.html

## 2023-12-04 NOTE — DISCHARGE NOTE NURSING/CASE MANAGEMENT/SOCIAL WORK - PATIENT PORTAL LINK FT
You can access the FollowMyHealth Patient Portal offered by [facility] by registering at the following website: [email or web address]. By joining [employer]’s FollowMyHealth portal, you will also be able to view your health information using other applications (apps) compatible with our system. You can access the FollowMyHealth Patient Portal offered by Health system by registering at the following website: [email or web address]. By joining [employer]’s FollowMyHealth portal, you will also be able to view your health information using other applications (apps) compatible with our system.

## 2023-12-04 NOTE — DISCHARGE NOTE NURSING/CASE MANAGEMENT/SOCIAL WORK - NSDCFUADDAPPT_GEN_ALL_CORE_FT
CHF REY 12/5/23 @ 4PM WITH DR FOLEY  CHF REY 12/5/23 @ 4PM WITH DR ALAINA PENA REY WITH CELSO MONROE  @ 12 PM 12/7/23

## 2023-12-05 ENCOUNTER — APPOINTMENT (OUTPATIENT)
Dept: CARDIOLOGY | Facility: CLINIC | Age: 68
End: 2023-12-05

## 2023-12-05 LAB
CULTURE RESULTS: SIGNIFICANT CHANGE UP
GLUCOSE BLDC GLUCOMTR-MCNC: 121 MG/DL — HIGH (ref 70–99)
GLUCOSE BLDC GLUCOMTR-MCNC: 121 MG/DL — HIGH (ref 70–99)
GLUCOSE BLDC GLUCOMTR-MCNC: 125 MG/DL — HIGH (ref 70–99)
GLUCOSE BLDC GLUCOMTR-MCNC: 125 MG/DL — HIGH (ref 70–99)
GLUCOSE BLDC GLUCOMTR-MCNC: 80 MG/DL — SIGNIFICANT CHANGE UP (ref 70–99)
GLUCOSE BLDC GLUCOMTR-MCNC: 80 MG/DL — SIGNIFICANT CHANGE UP (ref 70–99)
GLUCOSE BLDC GLUCOMTR-MCNC: 98 MG/DL — SIGNIFICANT CHANGE UP (ref 70–99)
GLUCOSE BLDC GLUCOMTR-MCNC: 98 MG/DL — SIGNIFICANT CHANGE UP (ref 70–99)
SPECIMEN SOURCE: SIGNIFICANT CHANGE UP

## 2023-12-05 PROCEDURE — 99232 SBSQ HOSP IP/OBS MODERATE 35: CPT

## 2023-12-05 RX ADMIN — TIOTROPIUM BROMIDE 2 PUFF(S): 18 CAPSULE ORAL; RESPIRATORY (INHALATION) at 09:50

## 2023-12-05 RX ADMIN — Medication 40 MILLIGRAM(S): at 11:50

## 2023-12-05 RX ADMIN — Medication 500 MILLIGRAM(S): at 22:53

## 2023-12-05 RX ADMIN — ENOXAPARIN SODIUM 40 MILLIGRAM(S): 100 INJECTION SUBCUTANEOUS at 11:49

## 2023-12-05 RX ADMIN — Medication 1 MILLIGRAM(S): at 22:53

## 2023-12-05 RX ADMIN — PANTOPRAZOLE SODIUM 40 MILLIGRAM(S): 20 TABLET, DELAYED RELEASE ORAL at 05:59

## 2023-12-05 RX ADMIN — Medication 100 MILLIGRAM(S): at 22:53

## 2023-12-05 RX ADMIN — CLOPIDOGREL BISULFATE 75 MILLIGRAM(S): 75 TABLET, FILM COATED ORAL at 11:50

## 2023-12-05 RX ADMIN — BUDESONIDE AND FORMOTEROL FUMARATE DIHYDRATE 2 PUFF(S): 160; 4.5 AEROSOL RESPIRATORY (INHALATION) at 09:59

## 2023-12-05 RX ADMIN — Medication 2 PACKET(S): at 11:50

## 2023-12-05 RX ADMIN — Medication 500 MILLIGRAM(S): at 11:49

## 2023-12-05 RX ADMIN — ALBUTEROL 2 PUFF(S): 90 AEROSOL, METERED ORAL at 09:48

## 2023-12-05 RX ADMIN — Medication 100 MILLIGRAM(S): at 11:49

## 2023-12-05 RX ADMIN — Medication 81 MILLIGRAM(S): at 11:48

## 2023-12-05 RX ADMIN — Medication 180 MILLIGRAM(S): at 11:49

## 2023-12-05 RX ADMIN — Medication 10 MILLIGRAM(S): at 22:52

## 2023-12-05 RX ADMIN — GABAPENTIN 400 MILLIGRAM(S): 400 CAPSULE ORAL at 13:49

## 2023-12-05 RX ADMIN — Medication 1000 UNIT(S): at 11:50

## 2023-12-05 RX ADMIN — PREGABALIN 1000 MICROGRAM(S): 225 CAPSULE ORAL at 11:49

## 2023-12-05 RX ADMIN — Medication 10 MILLIGRAM(S): at 11:49

## 2023-12-05 RX ADMIN — QUETIAPINE FUMARATE 100 MILLIGRAM(S): 200 TABLET, FILM COATED ORAL at 22:54

## 2023-12-05 RX ADMIN — Medication 25 MILLIGRAM(S): at 11:50

## 2023-12-05 RX ADMIN — ATORVASTATIN CALCIUM 80 MILLIGRAM(S): 80 TABLET, FILM COATED ORAL at 22:52

## 2023-12-05 RX ADMIN — Medication 1 MILLIGRAM(S): at 11:49

## 2023-12-05 RX ADMIN — Medication 1 TABLET(S): at 11:50

## 2023-12-05 RX ADMIN — Medication 75 MILLIGRAM(S): at 11:50

## 2023-12-05 RX ADMIN — GABAPENTIN 400 MILLIGRAM(S): 400 CAPSULE ORAL at 22:53

## 2023-12-05 RX ADMIN — Medication 100 MILLIGRAM(S): at 11:51

## 2023-12-05 RX ADMIN — GABAPENTIN 400 MILLIGRAM(S): 400 CAPSULE ORAL at 05:59

## 2023-12-05 RX ADMIN — ENOXAPARIN SODIUM 40 MILLIGRAM(S): 100 INJECTION SUBCUTANEOUS at 22:53

## 2023-12-05 RX ADMIN — Medication 25 MILLIGRAM(S): at 22:53

## 2023-12-05 RX ADMIN — SPIRONOLACTONE 50 MILLIGRAM(S): 25 TABLET, FILM COATED ORAL at 11:50

## 2023-12-05 NOTE — ASU PATIENT PROFILE, ADULT - SURGICAL SITE INCISION
-Reduce bath to once every other day. On days when he/she doesn't receive a bath, soak a washcloth in warm water and use it to dampen the skin, then moisturize.  - Moisturize! Moisturize!! Moisturize!!!. Use creams instead of lotions, they are thicker. You can apply Vaseline or Aquaphor after applying lotions to areas most affected. - Use hypoallergenic soaps and lotions- products should have no dyes or perfumes. - Use hypoallergenic detergents for clothes and give extra rinses. - Do not wear wool sweaters directly on skin. If you must wear wool, wear a cotton shirt underneath  - Use a humidifier in room- helps to replace moisture dried out by heating.  - Use steroid creams as prescribed for flare up lesions- limit only to affected areas and avid prolonged use. Most babies do not have fever with the vaccines she received today, but a few babies will. In case of a fever (>100.4F), give 5ml of Tylenol every 4-6 hours as needed  - Call if fever is greater than 101F or lasts longer than 48 hours. - Call if severe lethargy or abnormal movements develops    Well Child Visit at 15 Months   WHAT [address] [facility] [place]:   What is a well child visit? A well child visit is when your child sees a healthcare provider to prevent health problems. Well child visits are used to track your child's growth and development. It is also a time for you to ask questions and to get information on how to keep your child safe. Write down your questions so you remember to ask them. Your child should have regular well child visits from birth to 16 years. What development milestones may my child reach by 15 months? Each child develops at his or her own pace.  Your child might have already reached the following milestones, or he or she may reach them later:  Say about 3 or 4 words    Point to a body part such as his or her eyes    Walk by himself or herself    Use a crayon to draw lines or other marks    Do the same actions he or she sees, such as sweeping the floor    Take off his or her socks or shoes    What can I do to keep my child safe in the car? Always place your child in a rear-facing car seat. Choose a seat that meets the Federal Motor Vehicle Safety Standard 213. Make sure the child safety seat has a harness and clip. Also make sure that the harness and clips fit snugly against your child. There should be no more than a finger width of space between the strap and your child's chest. Ask your healthcare provider for more information on car safety seats. Always put your child's car seat in the back seat. Never put your child's car seat in the front. This will help prevent him or her from being injured in an accident. What can I do to make my home safe for my child? Place ramsey at the top and bottom of stairs. Always make sure that the gate is closed and locked. Rocio Cherry will help protect your child from injury. Place guards over windows on the second floor or higher. This will prevent your child from falling out of the window. Keep furniture away from windows. Use cordless window shades, or get cords that do not have loops. You can also cut the loops. A child's head can fall through a looped cord, and the cord can become wrapped around his or her neck. Secure heavy or large items. This includes bookshelves, TVs, dressers, cabinets, and lamps. Make sure these items are held in place or nailed into the wall. Keep all medicines, car supplies, lawn supplies, and cleaning supplies out of your child's reach. Keep these items in a locked cabinet or closet. Call Poison Help (1-472.502.1843) if your child eats anything that could be harmful. Keep hot items away from your child. Turn pot handles toward the back on the stove. Keep hot food and liquid out of your child's reach. Do not hold your child while you have a hot item in your hand or are near a lit stove.  Do not leave curling irons or similar items on a counter. Your child may grab for the item and burn his or her hand. Store and lock all guns and weapons. Make sure all guns are unloaded before you store them. Make sure your child cannot reach or find where weapons are kept. Never  leave a loaded gun unattended. What can I do to keep my child safe in the sun and near water? Always keep your child within reach near water. This includes any time you are near ponds, lakes, pools, the ocean, or the bathtub. Never  leave your child alone in the bathtub or sink. A child can drown in less than 1 inch of water. Put sunscreen on your child. Ask your healthcare provider which sunscreen is safe for your child. Do not apply sunscreen to your child's eyes, mouth, or hands. What are other ways I can keep my child safe? Follow directions on the medicine label when you give your child medicine. Ask your child's healthcare provider for directions if you do not know how to give the medicine. If your child misses a dose, do not double the next dose. Ask how to make up the missed dose. Do not give aspirin to children younger than 18 years. Your child could develop Reye syndrome if he or she has the flu or a fever and takes aspirin. Reye syndrome can cause life-threatening brain and liver damage. Check your child's medicine labels for aspirin or salicylates. Keep plastic bags, latex balloons, and small objects away from your child. This includes marbles or small toys. These items can cause choking or suffocation. Regularly check the floor for these objects. Do not let your child use a walker. Walkers are not safe for your child. Walkers do not help your child learn to walk. Your child can roll down the stairs. Walkers also allow your child to reach higher. He or she might reach for hot drinks, grab pot handles off the stove, or reach for medicines or other unsafe items. Never leave your child in a room alone.   Make sure there is always a responsible adult with your child. What do I need to know about nutrition for my child? Give your child a variety of healthy foods. Healthy foods include fruits, vegetables, lean meats, and whole grains. Cut all foods into small pieces. Ask your healthcare provider how much of each type of food your child needs. The following are examples of healthy foods:    Whole grains such as bread, hot or cold cereal, and cooked pasta or rice    Protein from lean meats, chicken, fish, beans, or eggs    Dairy such as whole milk, cheese, or yogurt    Vegetables such as carrots, broccoli, or spinach    Fruits such as strawberries, oranges, apples, or tomatoes       Give your child whole milk until he or she is 3years old. Give your child no more than 2 to 3 cups of whole milk each day. His or her body needs the extra fat in whole milk to help him or her grow. After your child turns 2, he or she can drink skim or low-fat milk (such as 1% or 2% milk). Your child's healthcare provider may recommend low-fat milk if your child is overweight. Limit foods high in fat and sugar. These foods do not have the nutrients your child needs to be healthy. Food high in fat and sugar include snack foods (potato chips, candy, and other sweets), juice, fruit drinks, and soda. If your child eats these foods often, he or she may eat fewer healthy foods during meals. He or she may gain too much weight. Do not give your child foods that could cause him or her to choke. Examples include nuts, popcorn, and hard, raw vegetables. Cut round or hard foods into thin slices. Grapes and hotdogs are examples of round foods. Carrots are an example of hard foods. Give your child 3 meals and 2 to 3 snacks per day. Cut all food into small pieces. Examples of healthy snacks include applesauce, bananas, crackers, and cheese. Encourage your child to feed himself or herself. Give your child a cup to drink from and spoon to eat with. Be patient with your child.  Food may end up on the floor or on your child instead of in his or her mouth. It will take time for him or her to learn how to use a spoon to feed himself or herself. Have your child eat with other family members. This gives your child the opportunity to watch and learn how others eat. Let your child decide how much to eat. Give your child small portions. Let your child have another serving if he or she asks for one. Your child will be very hungry on some days and want to eat more. For example, your child may want to eat more on days when he or she is more active. Your child may also eat more if he or she is going through a growth spurt. There may be days when he or she eats less than usual.         Know that picky eating is a normal behavior in children under 3years of age. Your child may like a certain food on one day and then decide he or she does not like it the next day. He or she may eat only 1 or 2 foods for a whole week or longer. Your child may not like mixed foods, or he or she may not want different foods on the plate to touch. These eating habits are all normal. Continue to offer 2 or 3 different foods at each meal, even if your child is going through this phase. What can I do to keep my child's teeth healthy? Help your child brush his or her teeth 2 times each day. Brush his or her teeth after breakfast and before bed. Use a soft toothbrush and plain water. Thumb sucking or pacifier use can affect your child's tooth development. Talk to your child's healthcare provider if your child sucks his or her thumb or uses a pacifier regularly. Take your child to the dentist regularly. A dentist can make sure your child's teeth and gums are developing properly. Ask your child's dentist how often he or she needs to visit. What can I do to create routines for my child? Have your child take at least 1 nap each day.   Plan the nap early enough in the day so your child is still tired at bedtime. Your child needs 8 to 10 hours of sleep every night. Create a bedtime routine. This may include 1 hour of calm and quiet activities before bed. You can read to your child or listen to music. Brush your child's teeth during his or her bedtime routine. Plan for family time. Start family traditions such as going for a walk, listening to music, or playing games. Do not watch TV during family time. Have your child play with other family members during family time. What are other ways I can support my child? Do not punish your child with hitting, spanking, or yelling. Never  shake your child. Tell your child "no." Give your child short and simple rules. Put your child in time-out for 1 to 2 minutes in his or her crib or playpen. You can distract your child with a new activity when he or she behaves badly. Make sure everyone who cares for your child disciplines him or her the same way. Reward your child for good behavior. This will encourage your child to behave well. Limit your child's TV time as directed. Your child's brain will develop best through interaction with other people. This includes video chatting through a computer or phone with family or friends. Talk to your child's healthcare provider if you want to let your child watch TV. He or she can help you set healthy limits. Experts usually recommend less than 1 hour of TV per day for children younger than 2 years. Your provider may also be able to recommend appropriate programs for your child. Engage with your child if he or she watches TV. Do not let your child watch TV alone, if possible. You or another adult should watch with your child. Talk with your child about what he or she is watching. When TV time is done, try to apply what you and your child saw. For example, if your child saw someone drawing, have your child draw. TV time should never replace active playtime. Turn the TV off when your child plays.  Do not let your child watch TV during meals or within 1 hour of bedtime. Read to your child. This will comfort your child and help his or her brain develop. Point to pictures as you read. This will help your child make connections between pictures and words. Have other family members or caregivers read to your child. Play with your child. This will help your child develop social skills, motor skills, and speech. Take your child to play groups or activities. Let your child play with other children. This will help him or her grow and develop. Respect your child's fear of strangers. It is normal for your child to be afraid of strangers at this age. Do not force your child to talk or play with people he or she does not know. What do I need to know about my child's next well child visit? Your child's healthcare provider will tell you when to bring him or her in again. The next well child visit is usually at 18 months. Contact your child's healthcare provider if you have questions or concerns about your child's health or care before the next visit. Your child may need vaccines at the next well child visit. Your provider will tell you which vaccines your child needs and when your child should get them. CARE AGREEMENT:   You have the right to help plan your child's care. Learn about your child's health condition and how it may be treated. Discuss treatment options with your child's healthcare providers to decide what care you want for your child. The above information is an  only. It is not intended as medical advice for individual conditions or treatments. Talk to your doctor, nurse or pharmacist before following any medical regimen to see if it is safe and effective for you. © Copyright [employer] 2023 Information is for End User's use only and may not be sold, redistributed or otherwise used for commercial purposes. no

## 2023-12-06 VITALS
RESPIRATION RATE: 18 BRPM | DIASTOLIC BLOOD PRESSURE: 46 MMHG | HEART RATE: 71 BPM | OXYGEN SATURATION: 97 % | SYSTOLIC BLOOD PRESSURE: 108 MMHG | TEMPERATURE: 97 F

## 2023-12-06 LAB
GLUCOSE BLDC GLUCOMTR-MCNC: 104 MG/DL — HIGH (ref 70–99)
GLUCOSE BLDC GLUCOMTR-MCNC: 104 MG/DL — HIGH (ref 70–99)
GLUCOSE BLDC GLUCOMTR-MCNC: 127 MG/DL — HIGH (ref 70–99)
GLUCOSE BLDC GLUCOMTR-MCNC: 127 MG/DL — HIGH (ref 70–99)

## 2023-12-06 PROCEDURE — 99239 HOSP IP/OBS DSCHRG MGMT >30: CPT

## 2023-12-06 RX ORDER — CEFUROXIME AXETIL 250 MG
1 TABLET ORAL
Qty: 12 | Refills: 0
Start: 2023-12-06 | End: 2023-12-11

## 2023-12-06 RX ADMIN — BUDESONIDE AND FORMOTEROL FUMARATE DIHYDRATE 2 PUFF(S): 160; 4.5 AEROSOL RESPIRATORY (INHALATION) at 09:32

## 2023-12-06 RX ADMIN — PREGABALIN 1000 MICROGRAM(S): 225 CAPSULE ORAL at 10:39

## 2023-12-06 RX ADMIN — SPIRONOLACTONE 50 MILLIGRAM(S): 25 TABLET, FILM COATED ORAL at 10:40

## 2023-12-06 RX ADMIN — CLOPIDOGREL BISULFATE 75 MILLIGRAM(S): 75 TABLET, FILM COATED ORAL at 10:38

## 2023-12-06 RX ADMIN — Medication 25 MILLIGRAM(S): at 10:40

## 2023-12-06 RX ADMIN — ENOXAPARIN SODIUM 40 MILLIGRAM(S): 100 INJECTION SUBCUTANEOUS at 10:41

## 2023-12-06 RX ADMIN — TIOTROPIUM BROMIDE 2 PUFF(S): 18 CAPSULE ORAL; RESPIRATORY (INHALATION) at 09:32

## 2023-12-06 RX ADMIN — Medication 100 MILLIGRAM(S): at 10:40

## 2023-12-06 RX ADMIN — Medication 81 MILLIGRAM(S): at 10:37

## 2023-12-06 RX ADMIN — PANTOPRAZOLE SODIUM 40 MILLIGRAM(S): 20 TABLET, DELAYED RELEASE ORAL at 05:04

## 2023-12-06 RX ADMIN — Medication 1 MILLIGRAM(S): at 10:40

## 2023-12-06 RX ADMIN — Medication 10 MILLIGRAM(S): at 10:38

## 2023-12-06 RX ADMIN — Medication 500 MILLIGRAM(S): at 10:39

## 2023-12-06 RX ADMIN — Medication 1 TABLET(S): at 10:38

## 2023-12-06 RX ADMIN — GABAPENTIN 400 MILLIGRAM(S): 400 CAPSULE ORAL at 13:32

## 2023-12-06 RX ADMIN — Medication 1000 UNIT(S): at 10:41

## 2023-12-06 RX ADMIN — GABAPENTIN 400 MILLIGRAM(S): 400 CAPSULE ORAL at 05:04

## 2023-12-06 RX ADMIN — Medication 40 MILLIGRAM(S): at 10:39

## 2023-12-06 RX ADMIN — Medication 180 MILLIGRAM(S): at 10:48

## 2023-12-06 RX ADMIN — ALBUTEROL 2 PUFF(S): 90 AEROSOL, METERED ORAL at 09:31

## 2023-12-07 ENCOUNTER — APPOINTMENT (OUTPATIENT)
Dept: INTERNAL MEDICINE | Facility: CLINIC | Age: 68
End: 2023-12-07

## 2023-12-07 ENCOUNTER — NON-APPOINTMENT (OUTPATIENT)
Age: 68
End: 2023-12-07
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
## 2023-12-09 DIAGNOSIS — K70.30 ALCOHOLIC CIRRHOSIS OF LIVER WITHOUT ASCITES: ICD-10-CM

## 2023-12-09 DIAGNOSIS — F10.10 ALCOHOL ABUSE, UNCOMPLICATED: ICD-10-CM

## 2023-12-09 DIAGNOSIS — T82.539A LEAKAGE OF UNSPECIFIED CARDIAC AND VASCULAR DEVICES AND IMPLANTS, INITIAL ENCOUNTER: ICD-10-CM

## 2023-12-09 DIAGNOSIS — I27.20 PULMONARY HYPERTENSION, UNSPECIFIED: ICD-10-CM

## 2023-12-09 DIAGNOSIS — Z66 DO NOT RESUSCITATE: ICD-10-CM

## 2023-12-09 DIAGNOSIS — E66.2 MORBID (SEVERE) OBESITY WITH ALVEOLAR HYPOVENTILATION: ICD-10-CM

## 2023-12-09 DIAGNOSIS — J96.11 CHRONIC RESPIRATORY FAILURE WITH HYPOXIA: ICD-10-CM

## 2023-12-09 DIAGNOSIS — J44.1 CHRONIC OBSTRUCTIVE PULMONARY DISEASE WITH (ACUTE) EXACERBATION: ICD-10-CM

## 2023-12-09 DIAGNOSIS — Z89.512 ACQUIRED ABSENCE OF LEFT LEG BELOW KNEE: ICD-10-CM

## 2023-12-09 DIAGNOSIS — E88.09 OTHER DISORDERS OF PLASMA-PROTEIN METABOLISM, NOT ELSEWHERE CLASSIFIED: ICD-10-CM

## 2023-12-09 DIAGNOSIS — I50.33 ACUTE ON CHRONIC DIASTOLIC (CONGESTIVE) HEART FAILURE: ICD-10-CM
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
## 2023-12-09 DIAGNOSIS — I63.9 CEREBRAL INFARCTION, UNSPECIFIED: ICD-10-CM

## 2023-12-09 DIAGNOSIS — J96.12 CHRONIC RESPIRATORY FAILURE WITH HYPERCAPNIA: ICD-10-CM

## 2023-12-09 DIAGNOSIS — I48.19 OTHER PERSISTENT ATRIAL FIBRILLATION: ICD-10-CM

## 2023-12-09 DIAGNOSIS — Z99.81 DEPENDENCE ON SUPPLEMENTAL OXYGEN: ICD-10-CM

## 2023-12-09 DIAGNOSIS — I11.0 HYPERTENSIVE HEART DISEASE WITH HEART FAILURE: ICD-10-CM

## 2023-12-09 DIAGNOSIS — R29.700 NIHSS SCORE 0: ICD-10-CM

## 2023-12-10 DIAGNOSIS — Z86.73 PERSONAL HISTORY OF TRANSIENT ISCHEMIC ATTACK (TIA), AND CEREBRAL INFARCTION WITHOUT RESIDUAL DEFICITS: ICD-10-CM

## 2023-12-10 DIAGNOSIS — I48.21 PERMANENT ATRIAL FIBRILLATION: ICD-10-CM

## 2023-12-10 DIAGNOSIS — E66.01 MORBID (SEVERE) OBESITY DUE TO EXCESS CALORIES: ICD-10-CM

## 2023-12-10 DIAGNOSIS — J96.21 ACUTE AND CHRONIC RESPIRATORY FAILURE WITH HYPOXIA: ICD-10-CM

## 2023-12-10 DIAGNOSIS — K70.30 ALCOHOLIC CIRRHOSIS OF LIVER WITHOUT ASCITES: ICD-10-CM

## 2023-12-10 DIAGNOSIS — Z79.4 LONG TERM (CURRENT) USE OF INSULIN: ICD-10-CM

## 2023-12-10 DIAGNOSIS — K21.9 GASTRO-ESOPHAGEAL REFLUX DISEASE WITHOUT ESOPHAGITIS: ICD-10-CM

## 2023-12-10 DIAGNOSIS — J11.08 INFLUENZA DUE TO UNIDENTIFIED INFLUENZA VIRUS WITH SPECIFIED PNEUMONIA: ICD-10-CM

## 2023-12-10 DIAGNOSIS — F10.20 ALCOHOL DEPENDENCE, UNCOMPLICATED: ICD-10-CM

## 2023-12-10 DIAGNOSIS — I50.32 CHRONIC DIASTOLIC (CONGESTIVE) HEART FAILURE: ICD-10-CM

## 2023-12-10 DIAGNOSIS — J43.9 EMPHYSEMA, UNSPECIFIED: ICD-10-CM

## 2023-12-10 DIAGNOSIS — I27.20 PULMONARY HYPERTENSION, UNSPECIFIED: ICD-10-CM

## 2023-12-10 DIAGNOSIS — I34.0 NONRHEUMATIC MITRAL (VALVE) INSUFFICIENCY: ICD-10-CM

## 2023-12-10 DIAGNOSIS — Z93.0 TRACHEOSTOMY STATUS: ICD-10-CM

## 2023-12-10 DIAGNOSIS — Z99.81 DEPENDENCE ON SUPPLEMENTAL OXYGEN: ICD-10-CM

## 2023-12-10 DIAGNOSIS — Z89.512 ACQUIRED ABSENCE OF LEFT LEG BELOW KNEE: ICD-10-CM

## 2023-12-10 DIAGNOSIS — Z87.891 PERSONAL HISTORY OF NICOTINE DEPENDENCE: ICD-10-CM

## 2023-12-10 DIAGNOSIS — I11.0 HYPERTENSIVE HEART DISEASE WITH HEART FAILURE: ICD-10-CM

## 2023-12-10 DIAGNOSIS — Z23 ENCOUNTER FOR IMMUNIZATION: ICD-10-CM

## 2023-12-10 DIAGNOSIS — J15.69 PNEUMONIA DUE TO OTHER GRAM-NEGATIVE BACTERIA: ICD-10-CM

## 2023-12-10 DIAGNOSIS — Z91.81 HISTORY OF FALLING: ICD-10-CM

## 2023-12-10 DIAGNOSIS — D64.9 ANEMIA, UNSPECIFIED: ICD-10-CM

## 2023-12-10 DIAGNOSIS — Z66 DO NOT RESUSCITATE: ICD-10-CM

## 2023-12-10 DIAGNOSIS — A41.9 SEPSIS, UNSPECIFIED ORGANISM: ICD-10-CM

## 2023-12-11 ENCOUNTER — APPOINTMENT (OUTPATIENT)
Dept: COLORECTAL SURGERY | Facility: CLINIC | Age: 68
End: 2023-12-11
Payer: MEDICARE

## 2023-12-11 DIAGNOSIS — K64.8 OTHER HEMORRHOIDS: ICD-10-CM

## 2023-12-11 PROCEDURE — 99213 OFFICE O/P EST LOW 20 MIN: CPT | Mod: 25

## 2023-12-11 PROCEDURE — 46500 INJECTION INTO HEMORRHOID(S): CPT

## 2023-12-12 RX ORDER — ASPIRIN ENTERIC COATED TABLETS 81 MG 81 MG/1
81 TABLET, DELAYED RELEASE ORAL DAILY
Qty: 90 | Refills: 1 | Status: ACTIVE | COMMUNITY
Start: 2021-10-22 | End: 1900-01-01

## 2023-12-13 ENCOUNTER — APPOINTMENT (OUTPATIENT)
Dept: INTERNAL MEDICINE | Facility: CLINIC | Age: 68
End: 2023-12-13

## 2023-12-13 ENCOUNTER — APPOINTMENT (OUTPATIENT)
Dept: INTERNAL MEDICINE | Facility: CLINIC | Age: 68
End: 2023-12-13
Payer: MEDICARE

## 2023-12-13 ENCOUNTER — NON-APPOINTMENT (OUTPATIENT)
Age: 68
End: 2023-12-13

## 2023-12-13 VITALS
SYSTOLIC BLOOD PRESSURE: 100 MMHG | DIASTOLIC BLOOD PRESSURE: 50 MMHG | OXYGEN SATURATION: 90 % | HEART RATE: 88 BPM | HEIGHT: 72 IN

## 2023-12-13 DIAGNOSIS — Z87.891 PERSONAL HISTORY OF NICOTINE DEPENDENCE: ICD-10-CM

## 2023-12-13 DIAGNOSIS — S88.112A COMPLETE TRAUMATIC AMPUTATION AT LVL BETWEEN KNEE AND ANKLE, LEFT LOWER LEG, INITIAL ENCOUNTER: ICD-10-CM

## 2023-12-13 DIAGNOSIS — I10 ESSENTIAL (PRIMARY) HYPERTENSION: ICD-10-CM

## 2023-12-13 DIAGNOSIS — Z95.818 PRESENCE OF OTHER CARDIAC IMPLANTS AND GRAFTS: ICD-10-CM

## 2023-12-13 DIAGNOSIS — J18.9 PNEUMONIA, UNSPECIFIED ORGANISM: ICD-10-CM

## 2023-12-13 DIAGNOSIS — G47.33 OBSTRUCTIVE SLEEP APNEA (ADULT) (PEDIATRIC): ICD-10-CM

## 2023-12-13 DIAGNOSIS — I50.9 HEART FAILURE, UNSPECIFIED: ICD-10-CM

## 2023-12-13 DIAGNOSIS — R09.02 HYPOXEMIA: ICD-10-CM

## 2023-12-13 DIAGNOSIS — Z99.81 HYPOXEMIA: ICD-10-CM

## 2023-12-13 DIAGNOSIS — J44.9 CHRONIC OBSTRUCTIVE PULMONARY DISEASE, UNSPECIFIED: ICD-10-CM

## 2023-12-13 PROCEDURE — 94060 EVALUATION OF WHEEZING: CPT

## 2023-12-13 PROCEDURE — 99214 OFFICE O/P EST MOD 30 MIN: CPT | Mod: 25

## 2023-12-17 PROBLEM — J44.9 COPD (CHRONIC OBSTRUCTIVE PULMONARY DISEASE): Status: ACTIVE | Noted: 2017-08-28

## 2023-12-17 PROBLEM — S88.112A: Status: RESOLVED | Noted: 2020-12-09 | Resolved: 2021-06-14

## 2023-12-17 PROBLEM — Z95.818 PRESENCE OF WATCHMAN LEFT ATRIAL APPENDAGE CLOSURE DEVICE: Status: ACTIVE | Noted: 2020-01-08

## 2023-12-17 PROBLEM — I50.9 CHF (CONGESTIVE HEART FAILURE): Status: ACTIVE | Noted: 2018-09-27

## 2023-12-17 PROBLEM — Z87.891 FORMER SMOKER: Status: ACTIVE | Noted: 2020-09-22

## 2023-12-17 PROBLEM — I10 ESSENTIAL (PRIMARY) HYPERTENSION: Status: ACTIVE | Noted: 2020-10-13

## 2023-12-17 NOTE — DATA REVIEWED
[FreeTextEntry1] : Spirometry pre and postbronchodilator therapy was performed. FVC 1.53 L which is 33% predicted.  FEV1 1.07 L which 29% predicted.  FEV1/FVC ratio 70%.  FEF 25/75% 0.83 L/s which is 23% predicted.  PEF 1.72 L/s which is 19% predicted.  Postbronchodilator: FEV1 1.09 L which 29% predicted.  PEF 1.66 L/s which is 19% predicted.  There is no significant bronchodilator response.

## 2023-12-17 NOTE — HISTORY OF PRESENT ILLNESS
[FreeTextEntry1] : Hospital follow-up [de-identified] : Mr. Boone presents for a hospital follow-up accompanied by his daughter.  The patient had 2 recent admissions to [facility] for acute on chronic hypoxic/hypercapnic respiratory failure.  The patient is morbidly obese and is on chronic supplemental oxygen.  He has refused polysomnography examination in the past.  He also has a history of atrial fibrillation, CHF, left BKA.  Patient also had an episode of multifocal pneumonia which was treated with antibiotics intravenously as an inpatient and then orally as an outpatient.

## 2023-12-17 NOTE — PHYSICAL EXAM
[No Acute Distress] : no acute distress [Well Nourished] : well nourished [Well Developed] : well developed [Well-Appearing] : well-appearing [Normal Sclera/Conjunctiva] : normal sclera/conjunctiva [PERRL] : pupils equal round and reactive to light [EOMI] : extraocular movements intact [Normal Outer Ear/Nose] : the outer ears and nose were normal in appearance [Normal Oropharynx] : the oropharynx was normal [No JVD] : no jugular venous distention [No Lymphadenopathy] : no lymphadenopathy [Supple] : supple [Thyroid Normal, No Nodules] : the thyroid was normal and there were no nodules present [No Respiratory Distress] : no respiratory distress  [No Accessory Muscle Use] : no accessory muscle use [Clear to Auscultation] : lungs were clear to auscultation bilaterally [Normal Rate] : normal rate  [Regular Rhythm] : with a regular rhythm [Normal S1, S2] : normal S1 and S2 [No Murmur] : no murmur heard [No Carotid Bruits] : no carotid bruits [No Abdominal Bruit] : a ~M bruit was not heard ~T in the abdomen [No Varicosities] : no varicosities [Pedal Pulses Present] : the pedal pulses are present [No Palpable Aorta] : no palpable aorta [No Extremity Clubbing/Cyanosis] : no extremity clubbing/cyanosis [Soft] : abdomen soft [Non Tender] : non-tender [Non-distended] : non-distended [No Masses] : no abdominal mass palpated [No HSM] : no HSM [Normal Bowel Sounds] : normal bowel sounds [Normal Posterior Cervical Nodes] : no posterior cervical lymphadenopathy [Normal Anterior Cervical Nodes] : no anterior cervical lymphadenopathy [No CVA Tenderness] : no CVA  tenderness [No Spinal Tenderness] : no spinal tenderness [No Joint Swelling] : no joint swelling [Grossly Normal Strength/Tone] : grossly normal strength/tone [Coordination Grossly Intact] : coordination grossly intact [No Focal Deficits] : no focal deficits [Normal Gait] : normal gait [Normal Affect] : the affect was normal [Deep Tendon Reflexes (DTR)] : deep tendon reflexes were 2+ and symmetric [Normal Insight/Judgement] : insight and judgment were intact [de-identified] : Bilateral lower extremity edema [de-identified] : Left BKA [de-identified] : Multiple ecchymoses on both upper extremities

## 2023-12-17 NOTE — PLAN
[FreeTextEntry1] : Mr. Boone presents for follow-up evaluation.  He was recently admitted to the hospital on 2 separate occasions.  He had multifocal pneumonia.  Patient continues to have episodes of acute on chronic hypercapnic/hypoxic respiratory failure.  He is on supplemental oxygen 2 to 3 L/min nasal cannula continuously.  1.  Patient will continue with current medication regiment.  He will discontinue Incruse Ellipta and continue on Symbicort 160/4.5 mcg 2 puffs twice daily with albuterol for rescue therapy. 2.  Patient continues to refuse outpatient polysomnography examination he states that he will not wear a mask at night.  The patient does tolerate CPAP/BiPAP mask when he is admitted to the hospital with acute respiratory failure. 3.  Repeat CT scan to delineate resolution of previously seen bilateral multifocal infiltrates.  4.  Cardiology follow-up to continue with Dr. Middleton #5.  Follow-up in this office as scheduled.

## 2023-12-18 ENCOUNTER — APPOINTMENT (OUTPATIENT)
Dept: FAMILY MEDICINE | Facility: CLINIC | Age: 68
End: 2023-12-18
Payer: MEDICARE

## 2023-12-18 DIAGNOSIS — S81.801A UNSPECIFIED OPEN WOUND, RIGHT LOWER LEG, INITIAL ENCOUNTER: ICD-10-CM

## 2023-12-18 DIAGNOSIS — I63.9 CEREBRAL INFARCTION, UNSPECIFIED: ICD-10-CM

## 2023-12-18 DIAGNOSIS — S81.809A UNSPECIFIED OPEN WOUND, UNSPECIFIED LOWER LEG, INITIAL ENCOUNTER: ICD-10-CM

## 2023-12-18 DIAGNOSIS — J18.9 PNEUMONIA, UNSPECIFIED ORGANISM: ICD-10-CM

## 2023-12-18 PROCEDURE — 99214 OFFICE O/P EST MOD 30 MIN: CPT | Mod: 95

## 2023-12-18 NOTE — ADDENDUM
[FreeTextEntry1] : This note was written by Chanell Lisa, acting as the  for Dr. Vasquez. This note accurately reflects the work and decisions made by Dr. Vasquez.

## 2023-12-18 NOTE — HISTORY OF PRESENT ILLNESS
[Home] : at home, [unfilled] , at the time of the visit. [Other Location: e.g. Home (Enter Location, City,State)___] : at [unfilled] [Other:____] : [unfilled] [Verbal consent obtained from patient] : the patient, [unfilled] [FreeTextEntry1] : Hospital follow-up [de-identified] : Pt was admitted to the hospital for pneumonia. Pts was admitted on Nov 18th and Discharged on the 29th. Pt was readmitted the next day and was discharged on the 6th. Pt now on Clopidogrel. Pt was admitted for pneumonia, currently followed by pulmonary. Pt has finished the antibiotic, will finish prednisone this week. Pts foot is not currently infected, but it is at risk due to openings in the bandage. Pt states the wounds have been oozing a thick-like blood. Pt states there were 4 open areas as of Saturday. Pt states there were two small open sores on his foot a few days prior to Saturday. Pt states there are open wounds on the leg are currently covered by bandages. Pts foot may be swelling due to excessive buildup of water. Pt had an US of the carotid artery and an MRI of the brain which showed no abnormalities. Pt currently has a Watchman device monitoring his stroke risk. Pt is on 24/7 oxygen, 4 liters. Pt has been limiting his fluid intake. All questions were asked and answered with the help of his daughter.

## 2023-12-18 NOTE — PHYSICAL EXAM
[No Acute Distress] : no acute distress [Well Nourished] : well nourished [Well Developed] : well developed [Well-Appearing] : well-appearing [Normal Sclera/Conjunctiva] : normal sclera/conjunctiva [PERRL] : pupils equal round and reactive to light [EOMI] : extraocular movements intact [Normal Outer Ear/Nose] : the outer ears and nose were normal in appearance [Normal Oropharynx] : the oropharynx was normal [No JVD] : no jugular venous distention [No Lymphadenopathy] : no lymphadenopathy [Supple] : supple [Thyroid Normal, No Nodules] : the thyroid was normal and there were no nodules present [No Respiratory Distress] : no respiratory distress  [No Accessory Muscle Use] : no accessory muscle use [Clear to Auscultation] : lungs were clear to auscultation bilaterally [Normal Rate] : normal rate  [Regular Rhythm] : with a regular rhythm [Normal S1, S2] : normal S1 and S2 [No Murmur] : no murmur heard [No Carotid Bruits] : no carotid bruits [No Abdominal Bruit] : a ~M bruit was not heard ~T in the abdomen [No Varicosities] : no varicosities [Pedal Pulses Present] : the pedal pulses are present [No Edema] : there was no peripheral edema [No Palpable Aorta] : no palpable aorta [No Extremity Clubbing/Cyanosis] : no extremity clubbing/cyanosis [Soft] : abdomen soft [Non Tender] : non-tender [Non-distended] : non-distended [No Masses] : no abdominal mass palpated [No HSM] : no HSM [Normal Bowel Sounds] : normal bowel sounds [Normal Posterior Cervical Nodes] : no posterior cervical lymphadenopathy [Normal Anterior Cervical Nodes] : no anterior cervical lymphadenopathy [No CVA Tenderness] : no CVA  tenderness [No Spinal Tenderness] : no spinal tenderness [No Joint Swelling] : no joint swelling [Grossly Normal Strength/Tone] : grossly normal strength/tone [Coordination Grossly Intact] : coordination grossly intact [No Focal Deficits] : no focal deficits [Normal Gait] : normal gait [Deep Tendon Reflexes (DTR)] : deep tendon reflexes were 2+ and symmetric [Normal Affect] : the affect was normal [Normal Insight/Judgement] : insight and judgment were intact [de-identified] : open wounds on lower extremity, skin pink, some serosanguinous oozing from wounds

## 2023-12-18 NOTE — PLAN
[FreeTextEntry1] : Recommend the patient elevate his leg.  Recommend the patient monitor the temperature and color of his leg.  Recommend the patient monitor  Referral to wound care. Will follow-up with the patient at his next CPE or as needed.

## 2023-12-28 ENCOUNTER — RX RENEWAL (OUTPATIENT)
Age: 68
End: 2023-12-28

## 2023-12-28 RX ORDER — GABAPENTIN 400 MG/1
400 CAPSULE ORAL
Qty: 90 | Refills: 0 | Status: ACTIVE | COMMUNITY
Start: 2021-03-05 | End: 1900-01-01

## 2023-12-29 NOTE — HISTORY OF PRESENT ILLNESS
[FreeTextEntry1] : 67 y/o male HFpEF with a hx of HTN, pHTN paroxysmal atrial fibrillation s/p Watchman, mod MR, PAD s/p L BKA, VIJAY, COPD (on 4 L of home O2), ETOH induced cirrhosis, trach, who presented to  on 7/8 for worsening dyspnea, improving with diuresis and increasing GDMT, who now presents for follow up.  The patient had a reported history of HF 2/2 his pHTN with several admission over the past year.  He also reports frequently forgetting to take some of his medications, prompting his last hospitalization.  Since his discharge, he has felt slightly better taking his farxiga.  He still reports dyspnea daily with activity.  Due to his BKA, his overall walking is very limited.  He has been following with his pulmonology team who have advised him to use his CPAP machine to see if there is any improvement, but the patient has refused several times due to discomfort with the machine.  He reports a weight of 296-298, similar to his weight at discharge.  he has not checked his BP at home. He denies any syncope, LH/dizziness, chest pain, palpitations, PND, orthopnea, nausea/vomiting, abdominal pain, LE edema

## 2023-12-29 NOTE — PHYSICAL EXAM
[No Carotid Bruit] : no carotid bruit [Elevated JVD ____cm] : elevated JVD ~Vcm [Wheeze ____] : wheeze [unfilled] [Normal] : soft, non-tender, no masses/organomegaly, normal bowel sounds

## 2023-12-29 NOTE — CARDIOLOGY SUMMARY
[de-identified] : TTE 5/30/23:  EF 60-65%, LVEDd 6.16 IVS 1.33 PWd 1.32 LA Moderately dilated, moderate MR, mild to moderate TR, RSVP 33.

## 2023-12-29 NOTE — ASSESSMENT
[FreeTextEntry1] : 69 y/o male HFpEF with a hx of HTN, pHTN paroxysmal atrial fibrillation s/p Watchman, mod MR, PAD s/p L BKA, COPD (on 4 L of home O2), ETOH induced cirrhosis, trach, who was recently admitted to  for ADHF in [place] setting of pHTN, now with some improvement, He remains mildly overloaded with low normal BP.  1 HFpEF - continue Farxiga 10 mg po daily tomorrow - continue lasix to 40 mg PO BID - continue spironolactone to 25 mg po daily  2 Chronic atrial fibrillation: - on diltiazem 180 mg po daily - on metoprolol tartrate 25 mg po BID - Has watchman device.  3. VIJAY - F/u with pulm team  RTC 2 months with PA, 4 months with Dr. Oliva.

## 2024-01-02 ENCOUNTER — APPOINTMENT (OUTPATIENT)
Dept: HEART FAILURE | Facility: CLINIC | Age: 69
End: 2024-01-02

## 2024-01-04 ENCOUNTER — APPOINTMENT (OUTPATIENT)
Dept: INTERNAL MEDICINE | Facility: CLINIC | Age: 69
End: 2024-01-04

## 2024-01-24 NOTE — ED ADULT NURSE NOTE - NS ED NURSE RECORD ANOTHER VITAL SIGN
2024       Beverly Parra DO  [address]  [facility] [address]  Via Fax: [phone]      Patient: Luis Calvert   YOB: 1982   Date of Visit: 2024       Dear Dr. Parra:    Thank you for referring Luis Calvert to me for evaluation. Below are my notes for this visit with him.    If you have questions, please do not hesitate to call me. I look forward to following your patient along with you.      Sincerely,        Danny Us DO        CC: No Recipients  Danny Us DO  2024  9:57 AM  Signed       Cardiology Clinic Consultation  [address]  [address]  [facility] [address]  Dept Phone: [phone]      Luis Calvert  : 1982  PCP: Beverly Parra DO  Referring Physician: Beverly Parra, *    Reason for Consultation: No chief complaint on file.      History of Present Illness:  Dear Dr. Beverly Parra, *, We had the pleasure of seeing Luis Calvert in the [facility] [place]. Thank you for allowing me to see this patient in the office. As you know, this is a pleasant 41 year old male who presents with the chief complaint of No chief complaint on file.  .  Luis Calvert presents  2024: for cardiac evaluation for hypertension. He has been treated by his PCP for HTN and HL for the last year. He is on 3 agents, his BP has been well controlled. He had some edema on amlodipine. He works as a . Exercises regularly using a pelaton. He denies chest pain or shortness of breath. He does not follow any particular diet. No history of snoring. No FH of premature CAD. His father had a stroke however. He denies any other cardiac testing or cardiac complaints. Cholesterol still not well controlled, HbA1C is borderline. No new cardiac complaints. He brings meals from home. Works nights. Has social alcohol use on weekends.    Review of  Systems:  A 12-point Review of Systems was performed and is negative except for HPI.     Physical Exam:  There were no vitals taken for this visit.  Wt Readings from Last 4 Encounters:   No data found for Wt     Vital signs and previous weights were reviewed during today's visit.    Gen: no acute distress, AOx3  Head: Atraumatic, normocephalic  Neck: Supple, no JVP, no carotid bruit  CV: RRR, S1, S2, No G/R/M  Chest: Lungs BCTA, non-labored respirations   Abdomen: Nontender, nondistended  Musculoskeletal: No abnormalities or deformity  Neuro: No focal deficits, AOx3  Ext: warm, well perfused, no LE edema  Psych: Cooperative, calm    No past medical history on file.    No past surgical history on file.    No family history on file.         ALLERGIES:  Not on File    Current Medications    AMLODIPINE (NORVASC) 10 MG TABLET    Take 10 mg by mouth daily.    ATORVASTATIN (LIPITOR) 40 MG TABLET    Take 1 tablet by mouth daily.    HYDROCHLOROTHIAZIDE (HYDRODIURIL) 25 MG TABLET    Take 1 tablet by mouth daily.    LISINOPRIL (ZESTRIL) 20 MG TABLET    Take 1 tablet by mouth daily.       Luis's PMH, PSH, Family Hx, Social Hx, Allergies, and Medications were reviewed with the patient and updated during today's visit. In addition, I discussed medication dosage, usage, goals of therapy, and side effects with him.    Cardiovascular Diagnostic Studies:  LAST EKG:  No results found for this or any previous visit (from the past 4464 hour(s)).    LAST ECHO/ECHO STRESS:  No results found for this or any previous visit.    CATH REPORT:  No results found for this or any previous visit.  STRESS TEST:   No results found for this or any previous visit.  NUCLEAR STRESS TEST:   No results found for this or any previous visit.  VASCULAR IMAGING:  No orders to display        Labs:        Invalid input(s): \"BMP\", \"AGAP\", \"FST1\"          Assessment:  There is no problem list on file for this patient.      Plan:  1/22024:  Has resistant  hypertension, Maintained on antihypertensives: norvasc, lisinopril, hydrochlorothiazide   Secondary HTN workup: labs, renal duplex, sleep study  TTE  Urine microalb  Consider CT calcium score at age of 42  Would repeat lipids  Discussed diet recs for Htn  Discussed diet recs for HL  Will try to cut down BP meds in future, will try to stop hydrochlorothiazide  Limit alcohol use  Will try to cut down atorvastatin in the future  No results found for: \"CHOLESTEROL\"  No results found for: \"HDL\"  No results found for: \"CHOHDL\"  No results found for: \"TRIGLYCERIDE\"  No results found for: \"CALCLDL\"  No results found for: \"HGBA1C\"    Discussed lifestyle recommendations, dietary/exercise recommendations and provided literature  Follow-up with blood work / labs and recommended testing as scheduled  Literature in the after visit summary was provided on advised dietary, exercise, weight recommendations along with correct blood pressure measurement techniques. Today's vitals were given to the patient at checkout   Most recent ECHO / Stress Test / CT / Vascular imaging listed below  Pertinent labs were reviewed and displayed below  The patient's previous laboratory and cardiac diagnostic testing listed below has been reviewed and was utilized to establish my current plan of care.  Previous outside notes were reviewed and taken into consideration when deciding further treatment.  Time spent evaluating the patient includes chart preparation, review of labs, testing, previous notes from providers  Orders listed below:     I personally visualized and reviewed: Previous testing, outpt labs    Discussed with: Patient in detail    Return to Clinic: No follow-ups on file. Patient instructed to have all ordered testing prior to follow-up visit.     Orders Placed During This Encounter:  Orders Placed This Encounter   • lisinopril (ZESTRIL) 20 MG tablet   • hydroCHLOROthiazide (HYDRODIURIL) 25 MG tablet   • atorvastatin (LIPITOR) 40 MG  tablet   • amLODIPine (NORVASC) 10 MG tablet        Medical Problem associated handouts and literature were provided upon checkout for further patient education. Please see \"Patient Instructions\" tab.     Thank you Beverly Parra, * for allowing me to see this patient in our office. Please call our office with any questions. Correspondence will be sent to your office.    Danny Us D.O., F.A.C.C., F.A.C.OKingstonI  [facility]  [facility]  Please reach [facility] via [employer]   Office Locations:  [address], [place] [address]; [phone]  [address], [place] [address]; [phone]  * This note was generated in part using \"Dragon\" voice recognition technology. The report was reviewed by this physician but still may have unintentional errors due to inherent limitations of voice recognition technology.   Yes

## 2024-01-28 ENCOUNTER — RX RENEWAL (OUTPATIENT)
Age: 69
End: 2024-01-28

## 2024-01-29 RX ORDER — BUSPIRONE HYDROCHLORIDE 10 MG/1
10 TABLET ORAL
Qty: 60 | Refills: 3 | Status: ACTIVE | COMMUNITY
Start: 2021-02-13 | End: 1900-01-01

## 2024-02-01 ENCOUNTER — APPOINTMENT (OUTPATIENT)
Dept: CT IMAGING | Facility: CLINIC | Age: 69
End: 2024-02-01
Payer: MEDICARE

## 2024-02-01 ENCOUNTER — OUTPATIENT (OUTPATIENT)
Dept: OUTPATIENT SERVICES | Facility: HOSPITAL | Age: 69
LOS: 1 days | End: 2024-02-01
Payer: COMMERCIAL

## 2024-02-01 DIAGNOSIS — J18.9 PNEUMONIA, UNSPECIFIED ORGANISM: ICD-10-CM

## 2024-02-01 DIAGNOSIS — Z98.890 OTHER SPECIFIED POSTPROCEDURAL STATES: Chronic | ICD-10-CM

## 2024-02-01 DIAGNOSIS — Z89.512 ACQUIRED ABSENCE OF LEFT LEG BELOW KNEE: Chronic | ICD-10-CM

## 2024-02-01 DIAGNOSIS — S88.119A COMPLETE TRAUMATIC AMPUTATION AT LEVEL BETWEEN KNEE AND ANKLE, UNSPECIFIED LOWER LEG, INITIAL ENCOUNTER: Chronic | ICD-10-CM

## 2024-02-01 DIAGNOSIS — Z93.1 GASTROSTOMY STATUS: Chronic | ICD-10-CM

## 2024-02-01 DIAGNOSIS — Z95.818 PRESENCE OF OTHER CARDIAC IMPLANTS AND GRAFTS: Chronic | ICD-10-CM

## 2024-02-01 PROCEDURE — 71250 CT THORAX DX C-: CPT | Mod: 26

## 2024-02-01 PROCEDURE — 71250 CT THORAX DX C-: CPT

## 2024-02-02 ENCOUNTER — RX RENEWAL (OUTPATIENT)
Age: 69
End: 2024-02-02

## 2024-02-06 ENCOUNTER — MED ADMIN CHARGE (OUTPATIENT)
Age: 69
End: 2024-02-06

## 2024-02-06 ENCOUNTER — APPOINTMENT (OUTPATIENT)
Dept: FAMILY MEDICINE | Facility: CLINIC | Age: 69
End: 2024-02-06
Payer: MEDICARE

## 2024-02-06 VITALS
SYSTOLIC BLOOD PRESSURE: 92 MMHG | BODY MASS INDEX: 40.36 KG/M2 | HEIGHT: 72 IN | DIASTOLIC BLOOD PRESSURE: 60 MMHG | HEART RATE: 82 BPM | OXYGEN SATURATION: 98 % | WEIGHT: 298 LBS

## 2024-02-06 DIAGNOSIS — E66.2 MORBID (SEVERE) OBESITY WITH ALVEOLAR HYPOVENTILATION: ICD-10-CM

## 2024-02-06 DIAGNOSIS — Z89.512 ACQUIRED ABSENCE OF LEFT LEG BELOW KNEE: ICD-10-CM

## 2024-02-06 DIAGNOSIS — I48.91 UNSPECIFIED ATRIAL FIBRILLATION: ICD-10-CM

## 2024-02-06 DIAGNOSIS — E66.01 MORBID (SEVERE) OBESITY DUE TO EXCESS CALORIES: ICD-10-CM

## 2024-02-06 DIAGNOSIS — B35.4 TINEA CORPORIS: ICD-10-CM

## 2024-02-06 DIAGNOSIS — D50.9 IRON DEFICIENCY ANEMIA, UNSPECIFIED: ICD-10-CM

## 2024-02-06 PROCEDURE — 99214 OFFICE O/P EST MOD 30 MIN: CPT

## 2024-02-06 PROCEDURE — G2211 COMPLEX E/M VISIT ADD ON: CPT

## 2024-02-06 RX ORDER — CLOTRIMAZOLE AND BETAMETHASONE DIPROPIONATE 10; .5 MG/G; MG/G
1-0.05 CREAM TOPICAL TWICE DAILY
Qty: 1 | Refills: 2 | Status: ACTIVE | COMMUNITY
Start: 2024-02-06 | End: 1900-01-01

## 2024-02-06 RX ORDER — DOXAZOSIN 1 MG/1
1 TABLET ORAL
Qty: 90 | Refills: 0 | Status: ACTIVE | COMMUNITY
Start: 2023-10-04 | End: 1900-01-01

## 2024-02-06 RX ORDER — OMEPRAZOLE 40 MG/1
40 CAPSULE, DELAYED RELEASE ORAL
Qty: 90 | Refills: 3 | Status: ACTIVE | COMMUNITY
Start: 2021-01-20 | End: 1900-01-01

## 2024-02-06 RX ORDER — DAPAGLIFLOZIN 10 MG/1
10 TABLET, FILM COATED ORAL DAILY
Qty: 90 | Refills: 3 | Status: ACTIVE | COMMUNITY
Start: 2023-07-27 | End: 1900-01-01

## 2024-02-06 RX ORDER — METOPROLOL TARTRATE 25 MG/1
25 TABLET, FILM COATED ORAL
Qty: 180 | Refills: 0 | Status: ACTIVE | COMMUNITY
Start: 2020-10-14 | End: 1900-01-01

## 2024-02-06 RX ORDER — QUETIAPINE FUMARATE 50 MG/1
50 TABLET ORAL
Qty: 180 | Refills: 0 | Status: ACTIVE | COMMUNITY
Start: 2021-12-21 | End: 1900-01-01

## 2024-02-07 RX ORDER — ERGOCALCIFEROL 1.25 MG/1
1.25 MG CAPSULE, LIQUID FILLED ORAL
Qty: 12 | Refills: 1 | Status: ACTIVE | COMMUNITY
Start: 2023-07-28 | End: 1900-01-01

## 2024-02-07 RX ORDER — FUROSEMIDE 40 MG/1
40 TABLET ORAL TWICE DAILY
Qty: 180 | Refills: 1 | Status: ACTIVE | COMMUNITY
Start: 2023-02-10 | End: 1900-01-01

## 2024-02-07 RX ORDER — FOLIC ACID 1 MG/1
1 TABLET ORAL
Qty: 90 | Refills: 0 | Status: ACTIVE | COMMUNITY
Start: 2023-07-28 | End: 1900-01-01

## 2024-02-07 RX ORDER — ATORVASTATIN CALCIUM 80 MG/1
80 TABLET, FILM COATED ORAL
Qty: 90 | Refills: 1 | Status: ACTIVE | COMMUNITY
Start: 2023-12-12 | End: 1900-01-01

## 2024-02-07 RX ORDER — SPIRONOLACTONE 25 MG/1
25 TABLET ORAL
Qty: 180 | Refills: 1 | Status: ACTIVE | COMMUNITY
Start: 2020-12-30 | End: 1900-01-01

## 2024-02-07 RX ORDER — DILTIAZEM HYDROCHLORIDE 180 MG/1
180 CAPSULE, EXTENDED RELEASE ORAL
Qty: 90 | Refills: 3 | Status: ACTIVE | COMMUNITY
Start: 2019-06-01 | End: 1900-01-01

## 2024-02-08 RX ORDER — BUDESONIDE AND FORMOTEROL FUMARATE DIHYDRATE 160; 4.5 UG/1; UG/1
160-4.5 AEROSOL RESPIRATORY (INHALATION)
Qty: 1 | Refills: 4 | Status: ACTIVE | COMMUNITY
Start: 2023-03-29

## 2024-02-11 PROBLEM — D50.9 ANEMIA, IRON DEFICIENCY: Status: ACTIVE | Noted: 2019-06-10

## 2024-02-11 PROBLEM — I48.91 ATRIAL FIBRILLATION: Status: ACTIVE | Noted: 2017-02-03

## 2024-02-11 PROBLEM — Z89.512 LEFT BELOW-KNEE AMPUTEE: Status: ACTIVE | Noted: 2022-03-24

## 2024-02-11 LAB
25(OH)D3 SERPL-MCNC: 44.2 NG/ML
ALBUMIN SERPL ELPH-MCNC: 3.9 G/DL
ALP BLD-CCNC: 98 U/L
ALT SERPL-CCNC: 7 U/L
ANION GAP SERPL CALC-SCNC: 8 MMOL/L
APPEARANCE: CLEAR
AST SERPL-CCNC: 11 U/L
BACTERIA: NEGATIVE /HPF
BASOPHILS # BLD AUTO: 0.03 K/UL
BASOPHILS NFR BLD AUTO: 0.4 %
BILIRUB SERPL-MCNC: 0.4 MG/DL
BILIRUBIN URINE: NEGATIVE
BLOOD URINE: NEGATIVE
BUN SERPL-MCNC: 12 MG/DL
CALCIUM SERPL-MCNC: 8.9 MG/DL
CAST: 0 /LPF
CHLORIDE SERPL-SCNC: 97 MMOL/L
CHOLEST SERPL-MCNC: 110 MG/DL
CO2 SERPL-SCNC: 33 MMOL/L
COLOR: YELLOW
CREAT SERPL-MCNC: 0.82 MG/DL
EGFR: 97 ML/MIN/1.73M2
EOSINOPHIL # BLD AUTO: 0.25 K/UL
EOSINOPHIL NFR BLD AUTO: 3.2 %
EPITHELIAL CELLS: 0 /HPF
ESTIMATED AVERAGE GLUCOSE: 108 MG/DL
FERRITIN SERPL-MCNC: 51 NG/ML
GLUCOSE QUALITATIVE U: >=1000 MG/DL
GLUCOSE SERPL-MCNC: 107 MG/DL
HBA1C MFR BLD HPLC: 5.4 %
HCT VFR BLD CALC: 34.2 %
HDLC SERPL-MCNC: 34 MG/DL
HGB BLD-MCNC: 10.1 G/DL
IMM GRANULOCYTES NFR BLD AUTO: 0.3 %
IRON SATN MFR SERPL: 6 %
IRON SERPL-MCNC: 24 UG/DL
KETONES URINE: NEGATIVE MG/DL
LDLC SERPL CALC-MCNC: 58 MG/DL
LEUKOCYTE ESTERASE URINE: NEGATIVE
LYMPHOCYTES # BLD AUTO: 0.74 K/UL
LYMPHOCYTES NFR BLD AUTO: 9.4 %
MAN DIFF?: NORMAL
MCHC RBC-ENTMCNC: 26.3 PG
MCHC RBC-ENTMCNC: 29.5 GM/DL
MCV RBC AUTO: 89.1 FL
MICROSCOPIC-UA: NORMAL
MONOCYTES # BLD AUTO: 0.56 K/UL
MONOCYTES NFR BLD AUTO: 7.1 %
NEUTROPHILS # BLD AUTO: 6.31 K/UL
NEUTROPHILS NFR BLD AUTO: 79.6 %
NITRITE URINE: NEGATIVE
NONHDLC SERPL-MCNC: 76 MG/DL
PH URINE: 7
PLATELET # BLD AUTO: 168 K/UL
POTASSIUM SERPL-SCNC: 4.4 MMOL/L
PROT SERPL-MCNC: 6.5 G/DL
PROTEIN URINE: NEGATIVE MG/DL
RBC # BLD: 3.84 M/UL
RBC # FLD: 15.7 %
RED BLOOD CELLS URINE: 0 /HPF
SODIUM SERPL-SCNC: 137 MMOL/L
SPECIFIC GRAVITY URINE: 1.01
TIBC SERPL-MCNC: 397 UG/DL
TRIGL SERPL-MCNC: 88 MG/DL
TSH SERPL-ACNC: 0.98 UIU/ML
UIBC SERPL-MCNC: 373 UG/DL
URATE SERPL-MCNC: 5.6 MG/DL
UROBILINOGEN URINE: 0.2 MG/DL
WBC # FLD AUTO: 7.91 K/UL
WHITE BLOOD CELLS URINE: 0 /HPF

## 2024-02-11 NOTE — PHYSICAL THERAPY INITIAL EVALUATION ADULT - GROSSLY INTACT, SENSORY
will independently evaluate the patient. Please see subsequent provider notes for further details and disposition.     Brief HPI: In brief, Chasity Mayorga is a 78 y.o. female that presents for fever and tachycardia.  EMS was called for heart rate greater than 200.  Patient's heart rate according to EMS was 110.  Patient is complaining of a headache and cough.  Limited history from patient due to age.  She says she is COVID.  Treated for frequent UTIs.     History due to age, acute on chronic medical conditions.  Additional history from EMS      Focused Physical exam:   Patient is febrile 39.4, tachycardic 110.  No hypotension.  94% on room air,  She is frail and cachectic.  She has a port in her right chest.  No wheezing rales tachypnea or  Soft nontender  abdomen  Bilateral pedal edema with muscle wasting.    EKG:  interpreted by me, Emergency Department Physician  1.  Sinus tachycardia 109, no ST elevations, QTc 420, no prior, interpreted 2045      Plan/MDM:   Elevation for sepsis including COVID, UTI.  Given IV fluids appropriate for sepsis and aztreonam given extensive allergies.    Please see subsequent provider note for further details and disposition     
unable to assess secondary to pt. lethargy

## 2024-02-11 NOTE — HISTORY OF PRESENT ILLNESS
[Family Member] : family member [FreeTextEntry1] : Follow up  [de-identified] : A 68-year-old male presents today for a follow-up. Mood is good. Pt had been admitted to the hospital in November for treatment of influenza, states it had caused a fluid buildup in his lungs. Pt has not had shingles as of yet. Pt is followed by the pulmonologist, Dr. Medina. Pt is wearing an assistive device for breathing at all times. Pt is followed by the cardiologist, Dr. Brambila. Pt c/o a rash on his butt, states it has not been itchy. Pt states he has discomfort when sitting on hard surfaces. Pt has been applying a skin repair ointment for treatment. Pts leg has healed nicely.

## 2024-02-11 NOTE — PLAN
[FreeTextEntry1] : Bloodwork done.  Discussed the shingles vaccine, highly recommend the patient get the vaccine due to the constant stress on his body. Discussed the RSV vaccine, informed the patient that if he was to wait until next season, he should get it around October.  Discussed the covid booster, patient had his last in 2021.  Recommend the patient get vaccinated due to his current health. Recommend the patient follow-up with his insurance company to find out what diet / weight loss medications are covered. Patients' choices are limited due to his current medical conditions.  Discussed weight loss medication with the patient. Recommend the patient attempt to engage in intermittent fasting and have discussed the risks of starting weight loss medication as well as the complications that come after stopping the medication. Recommend the patient decreased carbohydrate intake and try to engage in a plant based / protein centered diet.  Start Clotrimazole 1-0.05 cream to treat the rash on the patients butt. Recommend he apply twice daily.  Will follow-up with the patient in 6 months or as needed.

## 2024-02-11 NOTE — COUNSELING
[Benefits of weight loss discussed] : Benefits of weight loss discussed [Encouraged to maintain food diary] : Encouraged to maintain food diary [FreeTextEntry4] : 15

## 2024-02-11 NOTE — HEALTH RISK ASSESSMENT
[No] : In the past 12 months have you used drugs other than those required for medical reasons? No [No falls in past year] : Patient reported no falls in the past year [Patient not ambulatory] : Patient is not ambulatory [Assistive Device] : Patient uses an assistive device [Former] : Former [de-identified] : Wheelchair.

## 2024-02-14 ENCOUNTER — NON-APPOINTMENT (OUTPATIENT)
Age: 69
End: 2024-02-14

## 2024-02-15 ENCOUNTER — NON-APPOINTMENT (OUTPATIENT)
Age: 69
End: 2024-02-15

## 2024-02-16 ENCOUNTER — APPOINTMENT (OUTPATIENT)
Dept: CARDIOLOGY | Facility: CLINIC | Age: 69
End: 2024-02-16

## 2024-02-26 ENCOUNTER — RX RENEWAL (OUTPATIENT)
Age: 69
End: 2024-02-26

## 2024-02-26 ENCOUNTER — INPATIENT (INPATIENT)
Facility: HOSPITAL | Age: 69
LOS: 3 days | Discharge: HOME HEALTH | DRG: 871 | End: 2024-03-01
Attending: STUDENT IN AN ORGANIZED HEALTH CARE EDUCATION/TRAINING PROGRAM | Admitting: INTERNAL MEDICINE
Payer: MEDICARE

## 2024-02-26 VITALS
OXYGEN SATURATION: 83 % | HEART RATE: 109 BPM | DIASTOLIC BLOOD PRESSURE: 142 MMHG | RESPIRATION RATE: 26 BRPM | WEIGHT: 283.07 LBS | TEMPERATURE: 99 F | HEIGHT: 72 IN | SYSTOLIC BLOOD PRESSURE: 162 MMHG

## 2024-02-26 DIAGNOSIS — Z89.512 ACQUIRED ABSENCE OF LEFT LEG BELOW KNEE: Chronic | ICD-10-CM

## 2024-02-26 DIAGNOSIS — S88.119A COMPLETE TRAUMATIC AMPUTATION AT LEVEL BETWEEN KNEE AND ANKLE, UNSPECIFIED LOWER LEG, INITIAL ENCOUNTER: Chronic | ICD-10-CM

## 2024-02-26 DIAGNOSIS — R50.9 FEVER, UNSPECIFIED: ICD-10-CM

## 2024-02-26 DIAGNOSIS — Z93.1 GASTROSTOMY STATUS: Chronic | ICD-10-CM

## 2024-02-26 DIAGNOSIS — Z95.818 PRESENCE OF OTHER CARDIAC IMPLANTS AND GRAFTS: Chronic | ICD-10-CM

## 2024-02-26 DIAGNOSIS — Z98.890 OTHER SPECIFIED POSTPROCEDURAL STATES: Chronic | ICD-10-CM

## 2024-02-26 LAB
ALBUMIN SERPL ELPH-MCNC: 3.4 G/DL — SIGNIFICANT CHANGE UP (ref 3.3–5)
ALP SERPL-CCNC: 109 U/L — SIGNIFICANT CHANGE UP (ref 40–120)
ALT FLD-CCNC: 11 U/L — LOW (ref 12–78)
ANION GAP SERPL CALC-SCNC: 1 MMOL/L — LOW (ref 5–17)
APPEARANCE UR: CLEAR — SIGNIFICANT CHANGE UP
APTT BLD: 29.7 SEC — SIGNIFICANT CHANGE UP (ref 24.5–35.6)
AST SERPL-CCNC: 11 U/L — LOW (ref 15–37)
BASE EXCESS BLDV CALC-SCNC: 5.3 MMOL/L — HIGH (ref -2–3)
BASOPHILS # BLD AUTO: 0.02 K/UL — SIGNIFICANT CHANGE UP (ref 0–0.2)
BASOPHILS NFR BLD AUTO: 0.2 % — SIGNIFICANT CHANGE UP (ref 0–2)
BILIRUB SERPL-MCNC: 0.7 MG/DL — SIGNIFICANT CHANGE UP (ref 0.2–1.2)
BILIRUB UR-MCNC: NEGATIVE — SIGNIFICANT CHANGE UP
BUN SERPL-MCNC: 13 MG/DL — SIGNIFICANT CHANGE UP (ref 7–23)
CALCIUM SERPL-MCNC: 9.4 MG/DL — SIGNIFICANT CHANGE UP (ref 8.5–10.1)
CHLORIDE SERPL-SCNC: 98 MMOL/L — SIGNIFICANT CHANGE UP (ref 96–108)
CO2 SERPL-SCNC: 35 MMOL/L — HIGH (ref 22–31)
COLOR SPEC: YELLOW — SIGNIFICANT CHANGE UP
CREAT SERPL-MCNC: 0.91 MG/DL — SIGNIFICANT CHANGE UP (ref 0.5–1.3)
DIFF PNL FLD: NEGATIVE — SIGNIFICANT CHANGE UP
EGFR: 93 ML/MIN/1.73M2 — SIGNIFICANT CHANGE UP
EOSINOPHIL # BLD AUTO: 0.08 K/UL — SIGNIFICANT CHANGE UP (ref 0–0.5)
EOSINOPHIL NFR BLD AUTO: 0.7 % — SIGNIFICANT CHANGE UP (ref 0–6)
FLUAV AG NPH QL: SIGNIFICANT CHANGE UP
FLUBV AG NPH QL: SIGNIFICANT CHANGE UP
GAS PNL BLDV: SIGNIFICANT CHANGE UP
GLUCOSE SERPL-MCNC: 130 MG/DL — HIGH (ref 70–99)
GLUCOSE UR QL: >=1000 MG/DL
HCO3 BLDV-SCNC: 32 MMOL/L — HIGH (ref 22–29)
HCT VFR BLD CALC: 36 % — LOW (ref 39–50)
HGB BLD-MCNC: 11 G/DL — LOW (ref 13–17)
IMM GRANULOCYTES NFR BLD AUTO: 0.3 % — SIGNIFICANT CHANGE UP (ref 0–0.9)
INR BLD: 1.09 RATIO — SIGNIFICANT CHANGE UP (ref 0.85–1.18)
KETONES UR-MCNC: ABNORMAL MG/DL
LACTATE SERPL-SCNC: 1.6 MMOL/L — SIGNIFICANT CHANGE UP (ref 0.7–2)
LACTATE SERPL-SCNC: 2.2 MMOL/L — HIGH (ref 0.7–2)
LEUKOCYTE ESTERASE UR-ACNC: NEGATIVE — SIGNIFICANT CHANGE UP
LYMPHOCYTES # BLD AUTO: 0.42 K/UL — LOW (ref 1–3.3)
LYMPHOCYTES # BLD AUTO: 3.8 % — LOW (ref 13–44)
MAGNESIUM SERPL-MCNC: 1.6 MG/DL — SIGNIFICANT CHANGE UP (ref 1.6–2.6)
MCHC RBC-ENTMCNC: 26.2 PG — LOW (ref 27–34)
MCHC RBC-ENTMCNC: 30.6 GM/DL — LOW (ref 32–36)
MCV RBC AUTO: 85.7 FL — SIGNIFICANT CHANGE UP (ref 80–100)
MONOCYTES # BLD AUTO: 0.53 K/UL — SIGNIFICANT CHANGE UP (ref 0–0.9)
MONOCYTES NFR BLD AUTO: 4.8 % — SIGNIFICANT CHANGE UP (ref 2–14)
NEUTROPHILS # BLD AUTO: 9.87 K/UL — HIGH (ref 1.8–7.4)
NEUTROPHILS NFR BLD AUTO: 90.2 % — HIGH (ref 43–77)
NITRITE UR-MCNC: NEGATIVE — SIGNIFICANT CHANGE UP
NT-PROBNP SERPL-SCNC: 1156 PG/ML — HIGH (ref 0–125)
PCO2 BLDV: 54 MMHG — SIGNIFICANT CHANGE UP (ref 42–55)
PH BLDV: 7.38 — SIGNIFICANT CHANGE UP (ref 7.32–7.43)
PH UR: 6.5 — SIGNIFICANT CHANGE UP (ref 5–8)
PLATELET # BLD AUTO: 189 K/UL — SIGNIFICANT CHANGE UP (ref 150–400)
PO2 BLDV: 35 MMHG — SIGNIFICANT CHANGE UP (ref 25–45)
POTASSIUM SERPL-MCNC: 3.8 MMOL/L — SIGNIFICANT CHANGE UP (ref 3.5–5.3)
POTASSIUM SERPL-SCNC: 3.8 MMOL/L — SIGNIFICANT CHANGE UP (ref 3.5–5.3)
PROT SERPL-MCNC: 7.7 GM/DL — SIGNIFICANT CHANGE UP (ref 6–8.3)
PROT UR-MCNC: NEGATIVE MG/DL — SIGNIFICANT CHANGE UP
PROTHROM AB SERPL-ACNC: 12.3 SEC — SIGNIFICANT CHANGE UP (ref 9.5–13)
RBC # BLD: 4.2 M/UL — SIGNIFICANT CHANGE UP (ref 4.2–5.8)
RBC # FLD: 14.7 % — HIGH (ref 10.3–14.5)
RSV RNA NPH QL NAA+NON-PROBE: SIGNIFICANT CHANGE UP
SAO2 % BLDV: 60 % — LOW (ref 67–88)
SARS-COV-2 RNA SPEC QL NAA+PROBE: SIGNIFICANT CHANGE UP
SODIUM SERPL-SCNC: 134 MMOL/L — LOW (ref 135–145)
SP GR SPEC: 1.02 — SIGNIFICANT CHANGE UP (ref 1–1.03)
TROPONIN I, HIGH SENSITIVITY RESULT: 5.83 NG/L — SIGNIFICANT CHANGE UP
TROPONIN I, HIGH SENSITIVITY RESULT: 7.01 NG/L — SIGNIFICANT CHANGE UP
UROBILINOGEN FLD QL: 1 MG/DL — SIGNIFICANT CHANGE UP (ref 0.2–1)
WBC # BLD: 10.95 K/UL — HIGH (ref 3.8–10.5)
WBC # FLD AUTO: 10.95 K/UL — HIGH (ref 3.8–10.5)

## 2024-02-26 PROCEDURE — 80048 BASIC METABOLIC PNL TOTAL CA: CPT

## 2024-02-26 PROCEDURE — 97116 GAIT TRAINING THERAPY: CPT | Mod: GP

## 2024-02-26 PROCEDURE — 71045 X-RAY EXAM CHEST 1 VIEW: CPT | Mod: 26

## 2024-02-26 PROCEDURE — 81003 URINALYSIS AUTO W/O SCOPE: CPT

## 2024-02-26 PROCEDURE — 36415 COLL VENOUS BLD VENIPUNCTURE: CPT

## 2024-02-26 PROCEDURE — 93010 ELECTROCARDIOGRAM REPORT: CPT

## 2024-02-26 PROCEDURE — 99497 ADVNCD CARE PLAN 30 MIN: CPT

## 2024-02-26 PROCEDURE — 85027 COMPLETE CBC AUTOMATED: CPT

## 2024-02-26 PROCEDURE — 84145 PROCALCITONIN (PCT): CPT

## 2024-02-26 PROCEDURE — 99223 1ST HOSP IP/OBS HIGH 75: CPT | Mod: 25

## 2024-02-26 PROCEDURE — 99285 EMERGENCY DEPT VISIT HI MDM: CPT

## 2024-02-26 PROCEDURE — 84484 ASSAY OF TROPONIN QUANT: CPT

## 2024-02-26 PROCEDURE — 83880 ASSAY OF NATRIURETIC PEPTIDE: CPT

## 2024-02-26 PROCEDURE — 83605 ASSAY OF LACTIC ACID: CPT

## 2024-02-26 PROCEDURE — 94640 AIRWAY INHALATION TREATMENT: CPT

## 2024-02-26 PROCEDURE — 97162 PT EVAL MOD COMPLEX 30 MIN: CPT | Mod: GP

## 2024-02-26 RX ORDER — METOPROLOL TARTRATE 50 MG
25 TABLET ORAL
Refills: 0 | Status: DISCONTINUED | OUTPATIENT
Start: 2024-02-26 | End: 2024-03-01

## 2024-02-26 RX ORDER — PIPERACILLIN AND TAZOBACTAM 4; .5 G/20ML; G/20ML
3.38 INJECTION, POWDER, LYOPHILIZED, FOR SOLUTION INTRAVENOUS ONCE
Refills: 0 | Status: COMPLETED | OUTPATIENT
Start: 2024-02-26 | End: 2024-02-26

## 2024-02-26 RX ORDER — ONDANSETRON 8 MG/1
4 TABLET, FILM COATED ORAL EVERY 8 HOURS
Refills: 0 | Status: DISCONTINUED | OUTPATIENT
Start: 2024-02-26 | End: 2024-03-01

## 2024-02-26 RX ORDER — CLOPIDOGREL BISULFATE 75 MG/1
75 TABLET, FILM COATED ORAL DAILY
Refills: 0 | Status: DISCONTINUED | OUTPATIENT
Start: 2024-02-26 | End: 2024-03-01

## 2024-02-26 RX ORDER — GABAPENTIN 400 MG/1
400 CAPSULE ORAL THREE TIMES A DAY
Refills: 0 | Status: DISCONTINUED | OUTPATIENT
Start: 2024-02-26 | End: 2024-03-01

## 2024-02-26 RX ORDER — QUETIAPINE FUMARATE 200 MG/1
100 TABLET, FILM COATED ORAL AT BEDTIME
Refills: 0 | Status: DISCONTINUED | OUTPATIENT
Start: 2024-02-26 | End: 2024-03-01

## 2024-02-26 RX ORDER — IPRATROPIUM/ALBUTEROL SULFATE 18-103MCG
3 AEROSOL WITH ADAPTER (GRAM) INHALATION EVERY 6 HOURS
Refills: 0 | Status: DISCONTINUED | OUTPATIENT
Start: 2024-02-26 | End: 2024-03-01

## 2024-02-26 RX ORDER — DAPAGLIFLOZIN 10 MG/1
10 TABLET, FILM COATED ORAL EVERY 24 HOURS
Refills: 0 | Status: DISCONTINUED | OUTPATIENT
Start: 2024-02-26 | End: 2024-02-26

## 2024-02-26 RX ORDER — FUROSEMIDE 40 MG
40 TABLET ORAL
Refills: 0 | Status: DISCONTINUED | OUTPATIENT
Start: 2024-02-26 | End: 2024-03-01

## 2024-02-26 RX ORDER — VANCOMYCIN HCL 1 G
1000 VIAL (EA) INTRAVENOUS ONCE
Refills: 0 | Status: DISCONTINUED | OUTPATIENT
Start: 2024-02-26 | End: 2024-02-26

## 2024-02-26 RX ORDER — BUDESONIDE AND FORMOTEROL FUMARATE DIHYDRATE 160; 4.5 UG/1; UG/1
2 AEROSOL RESPIRATORY (INHALATION)
Refills: 0 | Status: DISCONTINUED | OUTPATIENT
Start: 2024-02-26 | End: 2024-03-01

## 2024-02-26 RX ORDER — ACETAMINOPHEN 500 MG
1000 TABLET ORAL ONCE
Refills: 0 | Status: COMPLETED | OUTPATIENT
Start: 2024-02-26 | End: 2024-02-26

## 2024-02-26 RX ORDER — ACETAMINOPHEN 500 MG
650 TABLET ORAL EVERY 6 HOURS
Refills: 0 | Status: DISCONTINUED | OUTPATIENT
Start: 2024-02-26 | End: 2024-03-01

## 2024-02-26 RX ORDER — SODIUM CHLORIDE 9 MG/ML
1000 INJECTION INTRAMUSCULAR; INTRAVENOUS; SUBCUTANEOUS ONCE
Refills: 0 | Status: COMPLETED | OUTPATIENT
Start: 2024-02-26 | End: 2024-02-26

## 2024-02-26 RX ORDER — DOXAZOSIN MESYLATE 4 MG
1 TABLET ORAL AT BEDTIME
Refills: 0 | Status: DISCONTINUED | OUTPATIENT
Start: 2024-02-26 | End: 2024-03-01

## 2024-02-26 RX ORDER — DILTIAZEM HCL 120 MG
180 CAPSULE, EXT RELEASE 24 HR ORAL DAILY
Refills: 0 | Status: DISCONTINUED | OUTPATIENT
Start: 2024-02-26 | End: 2024-02-26

## 2024-02-26 RX ORDER — ASPIRIN/CALCIUM CARB/MAGNESIUM 324 MG
81 TABLET ORAL DAILY
Refills: 0 | Status: DISCONTINUED | OUTPATIENT
Start: 2024-02-26 | End: 2024-03-01

## 2024-02-26 RX ORDER — DILTIAZEM HCL 120 MG
60 CAPSULE, EXT RELEASE 24 HR ORAL EVERY 8 HOURS
Refills: 0 | Status: DISCONTINUED | OUTPATIENT
Start: 2024-02-26 | End: 2024-03-01

## 2024-02-26 RX ORDER — DILTIAZEM HCL 120 MG
10 CAPSULE, EXT RELEASE 24 HR ORAL ONCE
Refills: 0 | Status: COMPLETED | OUTPATIENT
Start: 2024-02-26 | End: 2024-02-26

## 2024-02-26 RX ORDER — VANCOMYCIN HCL 1 G
2000 VIAL (EA) INTRAVENOUS ONCE
Refills: 0 | Status: COMPLETED | OUTPATIENT
Start: 2024-02-26 | End: 2024-02-26

## 2024-02-26 RX ORDER — HEPARIN SODIUM 5000 [USP'U]/ML
5000 INJECTION INTRAVENOUS; SUBCUTANEOUS EVERY 12 HOURS
Refills: 0 | Status: DISCONTINUED | OUTPATIENT
Start: 2024-02-26 | End: 2024-03-01

## 2024-02-26 RX ORDER — PANTOPRAZOLE SODIUM 20 MG/1
40 TABLET, DELAYED RELEASE ORAL
Refills: 0 | Status: DISCONTINUED | OUTPATIENT
Start: 2024-02-26 | End: 2024-03-01

## 2024-02-26 RX ORDER — LANOLIN ALCOHOL/MO/W.PET/CERES
3 CREAM (GRAM) TOPICAL AT BEDTIME
Refills: 0 | Status: DISCONTINUED | OUTPATIENT
Start: 2024-02-26 | End: 2024-03-01

## 2024-02-26 RX ORDER — PIPERACILLIN AND TAZOBACTAM 4; .5 G/20ML; G/20ML
3.38 INJECTION, POWDER, LYOPHILIZED, FOR SOLUTION INTRAVENOUS EVERY 8 HOURS
Refills: 0 | Status: DISCONTINUED | OUTPATIENT
Start: 2024-02-26 | End: 2024-03-01

## 2024-02-26 RX ORDER — ATORVASTATIN CALCIUM 80 MG/1
80 TABLET, FILM COATED ORAL AT BEDTIME
Refills: 0 | Status: DISCONTINUED | OUTPATIENT
Start: 2024-02-26 | End: 2024-03-01

## 2024-02-26 RX ORDER — INFLUENZA VIRUS VACCINE 15; 15; 15; 15 UG/.5ML; UG/.5ML; UG/.5ML; UG/.5ML
0.7 SUSPENSION INTRAMUSCULAR ONCE
Refills: 0 | Status: DISCONTINUED | OUTPATIENT
Start: 2024-02-26 | End: 2024-03-01

## 2024-02-26 RX ORDER — SPIRONOLACTONE 25 MG/1
50 TABLET, FILM COATED ORAL DAILY
Refills: 0 | Status: DISCONTINUED | OUTPATIENT
Start: 2024-02-26 | End: 2024-03-01

## 2024-02-26 RX ADMIN — Medication 40 MILLIGRAM(S): at 22:15

## 2024-02-26 RX ADMIN — GABAPENTIN 400 MILLIGRAM(S): 400 CAPSULE ORAL at 22:21

## 2024-02-26 RX ADMIN — Medication 110 MILLIGRAM(S): at 22:14

## 2024-02-26 RX ADMIN — Medication 10 MILLIGRAM(S): at 12:21

## 2024-02-26 RX ADMIN — Medication 60 MILLIGRAM(S): at 17:10

## 2024-02-26 RX ADMIN — Medication 10 MILLIGRAM(S): at 10:21

## 2024-02-26 RX ADMIN — Medication 1 MILLIGRAM(S): at 22:17

## 2024-02-26 RX ADMIN — SODIUM CHLORIDE 1000 MILLILITER(S): 9 INJECTION INTRAMUSCULAR; INTRAVENOUS; SUBCUTANEOUS at 10:09

## 2024-02-26 RX ADMIN — Medication 250 MILLIGRAM(S): at 10:53

## 2024-02-26 RX ADMIN — SODIUM CHLORIDE 1000 MILLILITER(S): 9 INJECTION INTRAMUSCULAR; INTRAVENOUS; SUBCUTANEOUS at 12:22

## 2024-02-26 RX ADMIN — PIPERACILLIN AND TAZOBACTAM 200 GRAM(S): 4; .5 INJECTION, POWDER, LYOPHILIZED, FOR SOLUTION INTRAVENOUS at 10:09

## 2024-02-26 RX ADMIN — QUETIAPINE FUMARATE 100 MILLIGRAM(S): 200 TABLET, FILM COATED ORAL at 22:15

## 2024-02-26 RX ADMIN — HEPARIN SODIUM 5000 UNIT(S): 5000 INJECTION INTRAVENOUS; SUBCUTANEOUS at 22:17

## 2024-02-26 RX ADMIN — Medication 10 MILLIGRAM(S): at 22:16

## 2024-02-26 RX ADMIN — Medication 110 MILLIGRAM(S): at 17:10

## 2024-02-26 RX ADMIN — ATORVASTATIN CALCIUM 80 MILLIGRAM(S): 80 TABLET, FILM COATED ORAL at 22:15

## 2024-02-26 RX ADMIN — Medication 25 MILLIGRAM(S): at 22:21

## 2024-02-26 RX ADMIN — Medication 1200 MILLIGRAM(S): at 22:15

## 2024-02-26 RX ADMIN — Medication 60 MILLIGRAM(S): at 22:14

## 2024-02-26 RX ADMIN — GABAPENTIN 400 MILLIGRAM(S): 400 CAPSULE ORAL at 17:10

## 2024-02-26 RX ADMIN — Medication 400 MILLIGRAM(S): at 10:46

## 2024-02-26 RX ADMIN — Medication 3 MILLILITER(S): at 21:12

## 2024-02-26 RX ADMIN — Medication 40 MILLIGRAM(S): at 17:09

## 2024-02-26 NOTE — H&P ADULT - NSHPPHYSICALEXAM_GEN_ALL_CORE
PEx  T(C): 38.1 (02-26-24 @ 13:30), Max: 39.3 (02-26-24 @ 09:36)  HR: 101 (02-26-24 @ 13:00) (101 - 145)  BP: 115/99 (02-26-24 @ 13:00) (97/40 - 162/142)  RR: 22 (02-26-24 @ 13:00) (22 - 29)  SpO2: 97% (02-26-24 @ 13:00) (83% - 99%)  Wt(kg): --  General:  obese male.  pale.  tired appearing.  NAD>    Skin: no rash or prominent lesions  Head: normocephalic, atraumatic     Sinuses: non-tender  Nose: no external lesions, mucosa non-inflamed, septum and turbinates normal  Throat: no erythema, exudates or lesions.  Neck: Supple without lymphadenopathy. Thyroid no thyromegaly, no palpable thyroid nodules, no palpable nodules or masses, carotid arteries no bruits.   Breasts: No palpable masses or lesions.  Heart: RRR, no murmur or gallop.  Normal S1, S2.  No S3, S4.   Lungs: crackles right base.  good air entry.    Chest wall: Normal insp   Abdomen:  Soft, NT/ND, normal bowel sounds, no HSM, no masses.  No peritoneal signs.   Back: spine normal without deformity or tenderness.  Normal ROM   : Exam normal.  no inguinal hernias.  Extremities:  left BKA.  right leg 1-2_ pitting edema.  chronic venous stasis.    Musculoskeletal: Normal gait and station. No decreased range of motion, instability, atrophy or abnormal strength or tone in the head, neck, spine, ribs, pelvis or extremities.   Neurologic: CN 2-12 normal. Sensation to pain, touch and proprioception normal. DTRs normal in upper and lower extremities. No pathologic reflexes.  Motor normal.  Psychiatric: Oriented X3, intact recent and remote memory, judgement and insight, normal mood and affect.

## 2024-02-26 NOTE — H&P ADULT - NSHPLABSRESULTS_GEN_ALL_CORE
11.0   10.95 )-----------( 189      ( 26 Feb 2024 09:13 )             36.0     02-26    134<L>  |  98  |  13  ----------------------------<  130<H>  3.8   |  35<H>  |  0.91    Ca    9.4      26 Feb 2024 09:13  Mg     1.6     02-26    TPro  7.7  /  Alb  3.4  /  TBili  0.7  /  DBili  x   /  AST  11<L>  /  ALT  11<L>  /  AlkPhos  109  02-26    CAPILLARY BLOOD GLUCOSE        PT/INR - ( 26 Feb 2024 10:17 )   PT: 12.3 sec;   INR: 1.09 ratio         PTT - ( 26 Feb 2024 10:17 )  PTT:29.7 sec  Urinalysis Basic - ( 26 Feb 2024 09:13 )    Color: x / Appearance: x / SG: x / pH: x  Gluc: 130 mg/dL / Ketone: x  / Bili: x / Urobili: x   Blood: x / Protein: x / Nitrite: x   Leuk Esterase: x / RBC: x / WBC x   Sq Epi: x / Non Sq Epi: x / Bacteria: x

## 2024-02-26 NOTE — PATIENT PROFILE ADULT - LEGAL HELP
-History per Mom Patient seen and examined on 7/4 at 2pm . Above resident not not fully completed at time of my note     HPI:  Healthy 7 m/o boy with R hydronephrosis and VUR  on antibiotics ppx  and Cardura(followed by outside pediatric urologist Dr. Villela) now admitted with second epiosde of acute urinary retention in setting of 2 days of possible abdominal pain/fussiness and loose stools (admixed with formed stool) last in [place] with "COVID" at that time VCUG neg for PUV .  No fever.  No URI symptoms.    Allergic/Immunologic: negative    ON MY PHYSICAL EXAM ON 7/4/2020 @ 14:00   Vital Signs Last 24 Hrs  T(C): 36.9 (04 Jul 2020 15:24), Max: 36.9 (04 Jul 2020 15:24)  T(F): 98.4 (04 Jul 2020 15:24), Max: 98.4 (04 Jul 2020 15:24)  HR: 153 (04 Jul 2020 15:24) (136 - 158)  BP: 113/70 (04 Jul 2020 12:03) (88/47 - 113/70)  BP(mean): --  RR: 36 (04 Jul 2020 15:24) (32 - 36)  SpO2: 100% (04 Jul 2020 15:24) (98% - 100%)    Gen - NAD, comfortable,happy in moms arms watching TV   HEENT - NC/AT, AFOSF, MMM, no nasal congestion or rhinorrhea, no conjunctival injection  Neck - supple without FIFI  CV - RRR, nml S1S2, no murmur noted  Lungs - CTAB with nml WOB  Abd - S, nondistended, nontender , no HSM, NABS, questionable palpable bladder after peng removed    - T1 circ male   Ext - warm and well perfused, <2 sec distal cap refill  Skin - diaper rash - red but no open excoriations, mostly covered in with diaper creme  Neuro - grossly nonfocal  VCUG no PUV but bilat bladder diverticulum , Right grade 4 VUR  MRI spine no tethering       ASSESSMENT AND PLAN:  This is a 7m1w Male with R-sided hydronephrosis (renal Lasix scan showed 65% function on left, 25% function on right), a prior history of acute urinary retention in setting of COVID-19 infection requiring hospitalization at [place], now admitted with 2 days of fussiness/looser stools and urinary retentionnow s/p Peng placement in Emergency Department with 350cc urine drained immediately.  Peng removed after VCUG and has voided several times   1) Acute urinary retention - peds urology consulted; continue Bactrim UTI ppx;  and cardura  VCUG negative for PUV- positive diverticulum and VUR   Spine MRI wnl no tethering   s/p peng and voiding but uncertain if full void- will obtain post void residual volume via straight cath and if elevated d/w  replacing peng. May need CIC for home pending repair as needed   2) Looser stools - send GI PCR and per Mom, this has been a longer standing issue with bulky frequent stools - can send stool malabsorption studies including stool reducing subs, alpha1 AT, and fecal elastase, mother requests cdiff but informed her that we do not send as likely contaminant in this age group and that child does not have S/S cdiff colitis   -consider GI referral  -10% reactive lymphocytes on CBC so may be a viral/infectious process?  3) Hydration - MIVF + POAL; can wean intravenous fluids once taking good PO intake  4) Mild hypercalcemia on BMP today - repeat labs as mother allows   5) Access - PIV in place    --  Discussed plan with Mom  bedside RN, and pediatric residents as well as  resident , anesthesia and radiology .  Shara Su no

## 2024-02-26 NOTE — ED ADULT NURSE REASSESSMENT NOTE - NS ED NURSE REASSESS COMMENT FT1
pt tachycardic 130's. Dr Sánchez made aware. pt evaluated at bedside. will order PO cardizem and upgrade pt to telemetry.

## 2024-02-26 NOTE — H&P ADULT - NSHPREVIEWOFSYSTEMS_GEN_ALL_CORE
ROS:  General:  chills  Skin: No rash or bothersome skin lesions  Musculoskeletal: No arthalgias, myalgias or joint swelling  Eyes: No visual changes or eye pain  Ears: No hearing loss , otorrhea or ear pain  Nose, Mouth, Throat: No nasal congestion, rhinorrhea, oral lesions, postnasal drip or sore throat  Cardio: No chest pain or palpitations. no lower extremity edema. no syncope. no claudication.   Respiratory: sob / cough  GI: No diarrhea, constipation, blood in stools, abdominal pain, vomiting or heartburn  : No urinary frequency, hematuria, incontinence, or dysuria  Neurologic: No headaches, parasthesias, confusion, dysarthria or gait instability  Psychiatric:  No anxiety or depression  Lymphatic:  No easy bruising, easy bleeding or swollen glands  Allergic: No itching, sneezing , watery eyes, clear rhinorrhea or recurrent infections

## 2024-02-26 NOTE — ED ADULT NURSE NOTE - CHIEF COMPLAINT QUOTE
pt brought in by EMS for shortness of breath. pt states he also has a gi bleed. denies chest pain. hx copd, chf. BKA. 83% pm room air. primary RN made aware brought to bedside. stat ekg to be completed. no other complaints at this time

## 2024-02-26 NOTE — ED ADULT NURSE NOTE - NSFALLRISKINTERV_ED_ALL_ED
Assistance with ambulation/Communicate fall risk and risk factors to all staff, patient, and family/Provide visual cue: yellow wristband, yellow gown, etc/Reinforce activity limits and safety measures with patient and family/Call bell, personal items and telephone in reach/Instruct patient to call for assistance before getting out of bed/chair/stretcher/Non-slip footwear applied when patient is off stretcher/[place] to call system/Physically safe environment - no spills, clutter or unnecessary equipment/Purposeful Proactive Rounding/Room/bathroom lighting operational, light cord in reach

## 2024-02-26 NOTE — PATIENT PROFILE ADULT - FALL HARM RISK - HARM RISK INTERVENTIONS
Assistance with ambulation/Assistance OOB with selected safe patient handling equipment/Communicate Risk of Fall with Harm to all staff/Discuss with provider need for PT consult/Monitor gait and stability/Provide patient with walking aids - walker, cane, crutches/Reinforce activity limits and safety measures with patient and family/Sit up slowly, dangle for a short time, stand at bedside before walking/Tailored Fall Risk Interventions/Visual Cue: Yellow wristband and red socks/Bed in lowest position, wheels locked, appropriate side rails in place/Call bell, personal items and telephone in reach/Instruct patient to call for assistance before getting out of bed or chair/Non-slip footwear when patient is out of bed/[place] to call system/Physically safe environment - no spills, clutter or unnecessary equipment/Purposeful Proactive Rounding/Room/bathroom lighting operational, light cord in reach

## 2024-02-26 NOTE — ED PROVIDER NOTE - OBJECTIVE STATEMENT
69 y/o male with a PMHx of chronic respiratory failure 2/2 COPD (on 4-5L NC), chronic Afib s/p watchman procedure (no AC due to hx GIB), HFpEF, PVD s/p L BKA, alcoholic liver cirrhosis, DT s/p trach now decannulated, HTN, VIJAY (noncompliant w/ CPAP), alcoholism, severe pulmonary HTN, morbid obesity, recent admission to  discharged on 11/29 for acute CVA (on DAPT), acute COPD exacerbation presents to the ED BIBA from [place] c/o SOB. Pt received nebs x2 and Decadron en route to ED. Not on anticoagulation. No other complaints at this time.

## 2024-02-26 NOTE — H&P ADULT - BIRTH SEX
S/p RRT x 2 for WOB, currently stable s/p deep suctioning and duonebs, currently hemodynamically stable  Etiology likely mucous impaction with component of tracheomalacia, possible aspiration PNA  - ENT consult for tracheomalacia: NTD  - Nasal trumpet for frequent deep suctioning  - Duonebs ATC  - Symbicort  - Chest PT, hypertonic saline, mucinex for airway clearance  - Consider robinol for secretions  - C/w zosyn for possible aspiration PNA, UTI  - C/w AVAPS at night for now  - C/w stress dose steroids as takes steroids at home, eventual taper  - Hold feeds Male S/p RRT x 2 for WOB, currently stable s/p deep suctioning and duonebs, currently hemodynamically stable  Etiology likely mucous impaction with component of tracheomalacia, possible aspiration PNA  - ENT consult for tracheomalacia: NTD  - Nasal trumpet for frequent deep suctioning  - Duonebs ATC  - Symbicort  - Chest PT, hypertonic saline, mucinex for airway clearance  - Consider robinol for secretions  - C/w zosyn for possible aspiration PNA, UTI (day 3 of 5)  - C/w AVAPS at night for now  - Stopped stress dose steroids, now on prednisone 40mg, will need taper  - Restart TFs, hold PO for now  - Swallow eval tomorrow, will likely needs MBS

## 2024-02-26 NOTE — ED ADULT NURSE NOTE - OBJECTIVE STATEMENT
Pt BIBA for SOB and difficulty breathing. O2 93-97 on 4L. Pt hands and lips appear cyanotic, pt grunting with difficulty speaking. Breath sounds clear bilaterally. Denies CP, HA, dizziness, n/v at this time. PMH COPD, CHF  Pt A&Ox4, full septic workup initiated

## 2024-02-26 NOTE — H&P ADULT - ASSESSMENT
67 y/o male with PMHX of chronic AFIB s/p watchmans, hx of ETOH abuse, cirrhosis, chronic diastolic CHF, COPD, chronic hypoxic respiratory failure s/p trach/ peg now decannulated on 4L home O2, left BKA, CVA Nov 2023 on DAPT who presented to  with CC of SOB.  In the ER, patient found to be febrile-- temp 102.7.  Tachy 140's.  CXR with RLL PNA.  Admitted for respiratory failure/ PNA/ COPD exacerbation.      #Acute on Chronic Respiratory Failure secondary to Sepsis secondary to PNA/ COPD Exacerbation:    Admit to medsur.    CXR reviewed-- b/l PNA-- worse on right.    IV ABX-- zosyn and doxy.    S/p vanco IV in the ER.    F/u pancultures.    Check sputum cx.    Check strep/ legionella AG.    Mucinex BID.    IV steroids/ nebs/ inhalers.    Follow RR/ O2 sats-- cont 4L NC.    Fever/ tachycardia/ lactic acidosis improved with 2L NSS And IV ABX.    Trend labs/ vitals.    Pulm eval-- follows with Dr. Mckenna.      #Chronic afib:    S/p watchmans.  No AC.    Cont metoprolol/ cardizem.      #Recent CVA Nov 2023:    Cont DAPT for now.    Unclear if patient was supposed to stop plavix after 21 days-- cardio f/u.      #Hx of cirrhosis/ ETOH:    Cont lasix/ spironolactone.      #Chronic diastolic CHF:    No signs/ sx of fluid overload at present.  Euvolemic.    Cont home meds.      #DVT proph:  heparin SQ.      D/w daughter in detail.  Please call with updates.  Litzy Boone [phone].     69 y/o male with PMHX of chronic AFIB s/p watchmans, hx of ETOH abuse, cirrhosis, chronic diastolic CHF, COPD, chronic hypoxic respiratory failure s/p trach/ peg now decannulated on 4L home O2, left BKA, CVA Nov 2023 on DAPT who presented to  with CC of SOB.  In the ER, patient found to be febrile-- temp 102.7.  Tachy 140's.  CXR with RLL PNA.  Admitted for respiratory failure/ PNA/ COPD exacerbation.      #Acute on Chronic Respiratory Failure secondary to Sepsis secondary to PNA/ COPD Exacerbation:    Admit to medsurg.    CXR reviewed-- b/l PNA-- worse on right.    IV ABX-- zosyn and doxy.    S/p vanco IV in the ER.    F/u pancultures.    Check sputum cx.    Check strep/ legionella AG.    Mucinex BID.    IV steroids/ nebs/ inhalers.    Follow RR/ O2 sats-- cont 4L NC.    Fever/ tachycardia/ lactic acidosis improved with 2L NSS And IV ABX.    Trend labs/ vitals.    Pulm eval-- follows with Dr. Mckenna.      #Rapid AFIB with RVR:    Suspect related to hypoxia/ PNA/ fever.    S/p watchmans.  No AC.    Cont metoprolol/ cardizem.      #Recent CVA Nov 2023:    Cont DAPT for now.    Unclear if patient was supposed to stop plavix after 21 days-- cardio f/u.      #Hx of cirrhosis/ ETOH:    Cont lasix/ spironolactone.      #Chronic diastolic CHF:    No signs/ sx of fluid overload at present.  Euvolemic.    Cont home meds.      #DVT proph:  heparin SQ.      D/w daughter in detail.  Please call with updates.  Litzy Boone [phone].

## 2024-02-26 NOTE — ED ADULT TRIAGE NOTE - CHIEF COMPLAINT QUOTE
pt brought in by EMS for shortness of breath. pt states he also has a gi bleed. denies chest pain. hx copd, chf. BKA. 83% pm room air. primary RN made aware brought to bedside. stat ekg to be completed pt brought in by EMS for shortness of breath. pt states he also has a gi bleed. denies chest pain. hx copd, chf. BKA. 83% pm room air. primary RN made aware brought to bedside. stat ekg to be completed. no other complaints at this time

## 2024-02-26 NOTE — PHARMACOTHERAPY INTERVENTION NOTE - COMMENTS
Med history complete, reviewed medications and allergies with patients med list from [facility] and confirmed medication list with doctor first med profile Med history complete, reviewed medications and allergies with patients med list from [facility] and with daughter Litzy via phone, confirmed medication list with doctor first med profile

## 2024-02-26 NOTE — H&P ADULT - HISTORY OF PRESENT ILLNESS
67 y/o male with PMHX of chronic AFIB s/p watchmans, hx of ETOH abuse, cirrhosis, chronic diastolic CHF, COPD, chronic hypoxic respiratory failure s/p trach and PEG now decannulated on 4L home O2, left BKA, CVA Nov 2023 on DAPT who presented to  with CC of SOB.  Patient notes SOB started last evening.  He felt increased WOB overnight and started with coughing.  Patient normally uses 4 L NC O2 continuous.  He had this on and was still feeling SOB.  Patient notes he was coughing up significant phlegm.  Patient presented to the ER for evaluation.  In the ER, patient found to be febrile-- temp 102.7.  Tachy 140's.  Tachynea with RR 26.  CXR with RLL PNA.  Admitted for respiratory failure/ PNA/ COPD exacerbation.        PAST MEDICAL & SURGICAL HISTORY:  Chronic atrial fibrillation  Alcohol abuse  CHF (congestive heart failure)  Cirrhosis  Emphysema of lung  Alcohol withdrawal  Collapsed lung  Meningitis  Falls  Anemia  Chronic obstructive pulmonary disease (COPD)  GERD (gastroesophageal reflux disease)  Hypertension  Presence of Watchman left atrial appendage closure device  Atrial fibrillation  COPD without exacerbation  Chronic CHF  S/P BKA (below knee amputation) unilateral, left  Presence of Watchman left atrial appendage closure device  Unilateral amputation of lower extremity below knee  H/O tracheostomy  now removed  S/P percutaneous endoscopic gastrostomy (PEG) tube placement  now removed        FAMILY HISTORY:  No pertinent family history in first degree relatives      Social History:    Lives in [facility].    No smoking.    Previous ETOH-- now quit.        Allergies:    Ceclor (Rash)  Duricef (Rash)

## 2024-02-26 NOTE — ED PROVIDER NOTE - PHYSICAL EXAMINATION
Gen:  Pt visibly SOB.   Head:  NC/AT  HEENT: pupils perrl,no pharyngeal erythema, uvula midline  Cardiac: S1S2, RRR  Abd: Soft, non tender  Resp: No distress, CTA   musculoskeletal:: no deformities, no swelling, strength +5/+5. Left BKA.  Skin: warm and dry as visualized, no rashes  Neuro: ronnell MARIEo x 4

## 2024-02-26 NOTE — H&P ADULT - CONVERSATION DETAILS
Discussed with patient and daugther on phone about advanced directives.    Reviewed old MOLST in computer from NOV 2023.    Patient states he would like to be DNR/ DNI.    Daughter also states DNR/ DNI.    MOLST printed from last admission also with DNR/ DNI.  Trial of BIPAP okay.    Reviewed with patient.    [facility]  This is error and daughter will call them.    Verified MOLST.    DNR / DNI.

## 2024-02-27 DIAGNOSIS — R06.02 SHORTNESS OF BREATH: ICD-10-CM

## 2024-02-27 DIAGNOSIS — J18.9 PNEUMONIA, UNSPECIFIED ORGANISM: ICD-10-CM

## 2024-02-27 DIAGNOSIS — J44.9 CHRONIC OBSTRUCTIVE PULMONARY DISEASE, UNSPECIFIED: ICD-10-CM

## 2024-02-27 DIAGNOSIS — E66.2 MORBID (SEVERE) OBESITY WITH ALVEOLAR HYPOVENTILATION: ICD-10-CM

## 2024-02-27 DIAGNOSIS — I50.33 ACUTE ON CHRONIC DIASTOLIC (CONGESTIVE) HEART FAILURE: ICD-10-CM

## 2024-02-27 DIAGNOSIS — I48.20 CHRONIC ATRIAL FIBRILLATION, UNSPECIFIED: ICD-10-CM

## 2024-02-27 LAB
ANION GAP SERPL CALC-SCNC: 7 MMOL/L — SIGNIFICANT CHANGE UP (ref 5–17)
BUN SERPL-MCNC: 22 MG/DL — SIGNIFICANT CHANGE UP (ref 7–23)
CALCIUM SERPL-MCNC: 8.9 MG/DL — SIGNIFICANT CHANGE UP (ref 8.5–10.1)
CHLORIDE SERPL-SCNC: 100 MMOL/L — SIGNIFICANT CHANGE UP (ref 96–108)
CO2 SERPL-SCNC: 28 MMOL/L — SIGNIFICANT CHANGE UP (ref 22–31)
CREAT SERPL-MCNC: 1.02 MG/DL — SIGNIFICANT CHANGE UP (ref 0.5–1.3)
EGFR: 81 ML/MIN/1.73M2 — SIGNIFICANT CHANGE UP
GLUCOSE SERPL-MCNC: 169 MG/DL — HIGH (ref 70–99)
HCT VFR BLD CALC: 28.6 % — LOW (ref 39–50)
HGB BLD-MCNC: 8.9 G/DL — LOW (ref 13–17)
MCHC RBC-ENTMCNC: 25.9 PG — LOW (ref 27–34)
MCHC RBC-ENTMCNC: 31.1 GM/DL — LOW (ref 32–36)
MCV RBC AUTO: 83.1 FL — SIGNIFICANT CHANGE UP (ref 80–100)
PLATELET # BLD AUTO: 152 K/UL — SIGNIFICANT CHANGE UP (ref 150–400)
POTASSIUM SERPL-MCNC: 3.8 MMOL/L — SIGNIFICANT CHANGE UP (ref 3.5–5.3)
POTASSIUM SERPL-SCNC: 3.8 MMOL/L — SIGNIFICANT CHANGE UP (ref 3.5–5.3)
RBC # BLD: 3.44 M/UL — LOW (ref 4.2–5.8)
RBC # FLD: 14.7 % — HIGH (ref 10.3–14.5)
SODIUM SERPL-SCNC: 135 MMOL/L — SIGNIFICANT CHANGE UP (ref 135–145)
WBC # BLD: 15.12 K/UL — HIGH (ref 3.8–10.5)
WBC # FLD AUTO: 15.12 K/UL — HIGH (ref 3.8–10.5)

## 2024-02-27 PROCEDURE — 99232 SBSQ HOSP IP/OBS MODERATE 35: CPT

## 2024-02-27 PROCEDURE — 99223 1ST HOSP IP/OBS HIGH 75: CPT

## 2024-02-27 RX ORDER — SENNA PLUS 8.6 MG/1
2 TABLET ORAL AT BEDTIME
Refills: 0 | Status: DISCONTINUED | OUTPATIENT
Start: 2024-02-27 | End: 2024-03-01

## 2024-02-27 RX ORDER — POLYETHYLENE GLYCOL 3350 17 G/17G
17 POWDER, FOR SOLUTION ORAL DAILY
Refills: 0 | Status: DISCONTINUED | OUTPATIENT
Start: 2024-02-27 | End: 2024-03-01

## 2024-02-27 RX ORDER — FUROSEMIDE 40 MG
20 TABLET ORAL ONCE
Refills: 0 | Status: COMPLETED | OUTPATIENT
Start: 2024-02-27 | End: 2024-02-27

## 2024-02-27 RX ADMIN — Medication 40 MILLIGRAM(S): at 06:03

## 2024-02-27 RX ADMIN — Medication 25 MILLIGRAM(S): at 21:37

## 2024-02-27 RX ADMIN — QUETIAPINE FUMARATE 100 MILLIGRAM(S): 200 TABLET, FILM COATED ORAL at 21:30

## 2024-02-27 RX ADMIN — PANTOPRAZOLE SODIUM 40 MILLIGRAM(S): 20 TABLET, DELAYED RELEASE ORAL at 06:04

## 2024-02-27 RX ADMIN — Medication 3 MILLILITER(S): at 21:12

## 2024-02-27 RX ADMIN — SENNA PLUS 2 TABLET(S): 8.6 TABLET ORAL at 21:32

## 2024-02-27 RX ADMIN — GABAPENTIN 400 MILLIGRAM(S): 400 CAPSULE ORAL at 21:37

## 2024-02-27 RX ADMIN — Medication 40 MILLIGRAM(S): at 14:08

## 2024-02-27 RX ADMIN — Medication 81 MILLIGRAM(S): at 09:35

## 2024-02-27 RX ADMIN — Medication 10 MILLIGRAM(S): at 09:37

## 2024-02-27 RX ADMIN — Medication 3 MILLILITER(S): at 14:38

## 2024-02-27 RX ADMIN — Medication 3 MILLILITER(S): at 09:12

## 2024-02-27 RX ADMIN — Medication 40 MILLIGRAM(S): at 09:35

## 2024-02-27 RX ADMIN — ATORVASTATIN CALCIUM 80 MILLIGRAM(S): 80 TABLET, FILM COATED ORAL at 21:32

## 2024-02-27 RX ADMIN — PIPERACILLIN AND TAZOBACTAM 25 GRAM(S): 4; .5 INJECTION, POWDER, LYOPHILIZED, FOR SOLUTION INTRAVENOUS at 11:14

## 2024-02-27 RX ADMIN — GABAPENTIN 400 MILLIGRAM(S): 400 CAPSULE ORAL at 14:08

## 2024-02-27 RX ADMIN — GABAPENTIN 400 MILLIGRAM(S): 400 CAPSULE ORAL at 06:06

## 2024-02-27 RX ADMIN — PIPERACILLIN AND TAZOBACTAM 25 GRAM(S): 4; .5 INJECTION, POWDER, LYOPHILIZED, FOR SOLUTION INTRAVENOUS at 20:08

## 2024-02-27 RX ADMIN — Medication 1 MILLIGRAM(S): at 21:30

## 2024-02-27 RX ADMIN — POLYETHYLENE GLYCOL 3350 17 GRAM(S): 17 POWDER, FOR SOLUTION ORAL at 11:19

## 2024-02-27 RX ADMIN — Medication 3 MILLILITER(S): at 02:21

## 2024-02-27 RX ADMIN — Medication 20 MILLIGRAM(S): at 09:35

## 2024-02-27 RX ADMIN — Medication 25 MILLIGRAM(S): at 09:36

## 2024-02-27 RX ADMIN — Medication 1200 MILLIGRAM(S): at 09:36

## 2024-02-27 RX ADMIN — HEPARIN SODIUM 5000 UNIT(S): 5000 INJECTION INTRAVENOUS; SUBCUTANEOUS at 09:35

## 2024-02-27 RX ADMIN — Medication 110 MILLIGRAM(S): at 21:30

## 2024-02-27 RX ADMIN — Medication 60 MILLIGRAM(S): at 21:32

## 2024-02-27 RX ADMIN — SPIRONOLACTONE 50 MILLIGRAM(S): 25 TABLET, FILM COATED ORAL at 09:36

## 2024-02-27 RX ADMIN — CLOPIDOGREL BISULFATE 75 MILLIGRAM(S): 75 TABLET, FILM COATED ORAL at 09:36

## 2024-02-27 RX ADMIN — Medication 60 MILLIGRAM(S): at 14:08

## 2024-02-27 RX ADMIN — BUDESONIDE AND FORMOTEROL FUMARATE DIHYDRATE 2 PUFF(S): 160; 4.5 AEROSOL RESPIRATORY (INHALATION) at 21:12

## 2024-02-27 RX ADMIN — Medication 1 TABLET(S): at 09:36

## 2024-02-27 RX ADMIN — PIPERACILLIN AND TAZOBACTAM 25 GRAM(S): 4; .5 INJECTION, POWDER, LYOPHILIZED, FOR SOLUTION INTRAVENOUS at 00:19

## 2024-02-27 RX ADMIN — Medication 110 MILLIGRAM(S): at 09:35

## 2024-02-27 RX ADMIN — HEPARIN SODIUM 5000 UNIT(S): 5000 INJECTION INTRAVENOUS; SUBCUTANEOUS at 21:33

## 2024-02-27 RX ADMIN — Medication 10 MILLIGRAM(S): at 21:30

## 2024-02-27 RX ADMIN — Medication 60 MILLIGRAM(S): at 06:03

## 2024-02-27 RX ADMIN — BUDESONIDE AND FORMOTEROL FUMARATE DIHYDRATE 2 PUFF(S): 160; 4.5 AEROSOL RESPIRATORY (INHALATION) at 09:12

## 2024-02-27 RX ADMIN — Medication 1200 MILLIGRAM(S): at 21:31

## 2024-02-27 NOTE — CONSULT NOTE ADULT - PROBLEM SELECTOR RECOMMENDATION 3
with rapid ventricular rate was on IV cardizem drip , with fever , now improved  rate , on home dose cardizem   patient has hx of recurrent GI bleed , has watchman device , on asa and plavix , continue medication     patient does have intermittent hemorrhoidal bleed , chronic anemia

## 2024-02-27 NOTE — CONSULT NOTE ADULT - SUBJECTIVE AND OBJECTIVE BOX
CHIEF COMPLAINT: worsening of shortness of breath     HPI:  67 y/o male with PMHX of chronic AFIB s/p watchmans, hx of ETOH abuse, cirrhosis, chronic diastolic CHF, COPD, chronic hypoxic respiratory failure s/p trach and PEG now decannulated on 4L home O2, left BKA, CVA Nov 2023 on DAPT who presented to  with CC of SOB.  Patient notes SOB started last evening.  He felt increased WOB overnight and started with coughing.  Patient normally uses 4 L NC O2 continuous.  He had this on and was still feeling SOB.  Patient notes he was coughing up significant phlegm.  Patient presented to the ER for evaluation.  In the ER, patient found to be febrile-- temp 102.7.  Tachy 140's.  Tachynea with RR 26.  CXR with RLL PNA.  Admitted for respiratory failure/ PNA/ COPD exacerbation.   and afib with rapid rate     Patient rate symptoms better with IV steroid, bronchodilator treatment , currently his rate has improved  , Patient did recieve IV fluid , his lactate level , troponin levels were normal       PAST MEDICAL & SURGICAL HISTORY:  Chronic atrial fibrillation  Alcohol abuse  CHF (congestive heart failure)  Cirrhosis  Emphysema of lung  Alcohol withdrawal  Collapsed lung  Meningitis  Falls  Anemia  Chronic obstructive pulmonary disease (COPD)  GERD (gastroesophageal reflux disease)  Hypertension  Presence of Watchman left atrial appendage closure device  Atrial fibrillation  COPD without exacerbation  S/P BKA (below knee amputation) unilateral, left  traumatic   Presence of Watchman left atrial appendage closure device  Unilateral amputation of lower extremity below knee  H/O tracheostomy  now removed  S/P percutaneous endoscopic gastrostomy (PEG) tube placement  now removed        FAMILY HISTORY:  No pertinent family history in first degree relatives      Social History:    Lives in [facility].    No smoking.    Previous ETOH-- now quit.        Allergies:    Ceclor (Rash)  Duricef (Rash)          Allergies    Ceclor (Rash)  Duricef (Rash)    Intolerances          MEDICATIONS:Home Medications:  albuterol 90 mcg/inh inhalation aerosol: 2 puff(s) inhaled every 4 hours, As Needed for wheezing (26 Feb 2024 10:38)  Aspirin Enteric Coated 81 mg oral delayed release tablet: 1 tab(s) orally once a day (26 Feb 2024 10:38)  atorvastatin 80 mg oral tablet: 1 tab(s) orally once a day (at bedtime) (26 Feb 2024 11:51)  budesonide-formoterol 160 mcg-4.5 mcg/inh inhalation aerosol: 2 puff(s) inhaled 2 times a day (26 Feb 2024 10:38)  busPIRone 10 mg oral tablet: 1 tab(s) orally 2 times a day (26 Feb 2024 10:38)  Cardizem  mg/24 hours oral tablet, extended release: 1 tab(s) orally once a day (26 Feb 2024 10:38)  cholecalciferol 25 mcg (1000 intl units) oral tablet: 1 tab(s) orally once a day (26 Feb 2024 10:38)  Colace 100 mg oral capsule: 2 cap(s) orally once a day (26 Feb 2024 10:38)  cyanocobalamin 1000 mcg oral tablet: 1 tab(s) orally once a day (26 Feb 2024 10:38)  doxazosin 1 mg oral tablet: 1 tab(s) orally once a day (at bedtime) (26 Feb 2024 10:38)  Farxiga 10 mg oral tablet: 1 tab(s) orally once a day (26 Feb 2024 10:38)  folic acid 1 mg oral tablet: 1 tab(s) orally once a day (26 Feb 2024 10:38)  furosemide 20 mg oral tablet: 2 tab(s) orally 2 times a day (26 Feb 2024 13:44)  gabapentin 400 mg oral capsule: 1 cap(s) orally 3 times a day (26 Feb 2024 10:38)  meclizine 25 mg oral tablet: 1 tab(s) orally every 8 hours as needed for Dizziness (26 Feb 2024 11:51)  Metoprolol Tartrate 25 mg oral tablet: 1 tab(s) orally 2 times a day (26 Feb 2024 10:38)  Multiple Vitamins oral tablet: 1 tab(s) orally once a day (26 Feb 2024 11:51)  omeprazole 40 mg oral delayed release capsule: 1 cap(s) orally once a day (26 Feb 2024 10:38)  Plavix 75 mg oral tablet: 1 tab(s) orally once a day (26 Feb 2024 13:45)  psyllium 500 mg oral capsule: 2 cap(s) orally once a day (26 Feb 2024 10:38)  QUEtiapine 50 mg oral tablet: 2 tab(s) orally once a day (at bedtime) (26 Feb 2024 10:38)  spironolactone 25 mg oral tablet: 2 tab(s) orally once a day (26 Feb 2024 10:38)  thiamine 100 mg oral tablet: 1 tab(s) orally once a day (26 Feb 2024 10:38)    MEDICATIONS  (STANDING):  albuterol/ipratropium for Nebulization 3 milliLiter(s) Nebulizer every 6 hours  aspirin enteric coated 81 milliGRAM(s) Oral daily  atorvastatin 80 milliGRAM(s) Oral at bedtime  budesonide 160 MICROgram(s)/formoterol 4.5 MICROgram(s) Inhaler 2 Puff(s) Inhalation two times a day  busPIRone 10 milliGRAM(s) Oral two times a day  clopidogrel Tablet 75 milliGRAM(s) Oral daily  diltiazem    Tablet 60 milliGRAM(s) Oral every 8 hours  doxazosin 1 milliGRAM(s) Oral at bedtime  doxycycline IVPB 100 milliGRAM(s) IV Intermittent every 12 hours  furosemide    Tablet 40 milliGRAM(s) Oral two times a day  gabapentin 400 milliGRAM(s) Oral three times a day  guaiFENesin ER 1200 milliGRAM(s) Oral every 12 hours  heparin   Injectable 5000 Unit(s) SubCutaneous every 12 hours  influenza  Vaccine (HIGH DOSE) 0.7 milliLiter(s) IntraMuscular once  methylPREDNISolone sodium succinate Injectable 40 milliGRAM(s) IV Push every 8 hours  metoprolol tartrate 25 milliGRAM(s) Oral two times a day  multivitamin 1 Tablet(s) Oral daily  pantoprazole    Tablet 40 milliGRAM(s) Oral before breakfast  piperacillin/tazobactam IVPB.. 3.375 Gram(s) IV Intermittent every 8 hours  QUEtiapine 100 milliGRAM(s) Oral at bedtime  spironolactone 50 milliGRAM(s) Oral daily    MEDICATIONS  (PRN):  acetaminophen     Tablet .. 650 milliGRAM(s) Oral every 6 hours PRN Temp greater or equal to 38C (100.4F), Mild Pain (1 - 3)  aluminum hydroxide/magnesium hydroxide/simethicone Suspension 30 milliLiter(s) Oral every 4 hours PRN Dyspepsia  melatonin 3 milliGRAM(s) Oral at bedtime PRN Insomnia  ondansetron Injectable 4 milliGRAM(s) IV Push every 8 hours PRN Nausea and/or Vomiting      REVIEW OF SYSTEMS:  as above   CONSTITUTIONAL: No weakness, fevers or chills  EYES/ENT: No visual changes;  No vertigo or throat pain   NECK: No pain or stiffness  RESPIRATORY: + cough, wheezing, hemoptysis; +shortness of breath  CARDIOVASCULAR: No chest pain or palpitations  GASTROINTESTINAL: No abdominal or epigastric pain. No nausea, vomiting, or hematemesis; No diarrhea or constipation. No melena or hematochezia.  GENITOURINARY: No dysuria, frequency or hematuria  NEUROLOGICAL: No numbness or weakness  SKIN: No itching, burning, rashes, or lesions   All other review of systems is negative unless indicated above    Vital Signs Last 24 Hrs  T(C): 36.8 (27 Feb 2024 07:27), Max: 39.3 (26 Feb 2024 09:36)  T(F): 98.3 (27 Feb 2024 07:27), Max: 102.7 (26 Feb 2024 09:36)  HR: 73 (27 Feb 2024 07:27) (73 - 145)  BP: 113/68 (27 Feb 2024 07:27) (97/40 - 162/142)  BP(mean): 79 (26 Feb 2024 17:43) (58 - 105)  RR: 20 (27 Feb 2024 07:27) (20 - 29)  SpO2: 95% (27 Feb 2024 07:27) (83% - 100%)    Parameters below as of 27 Feb 2024 07:27  Patient On (Oxygen Delivery Method): nasal cannula  O2 Flow (L/min): 4      I&O's Summary      PHYSICAL EXAM:    Constitutional: NAD, awake and alert, morbid obesity   HEENT: PERR, EOMI,  No oral cyananosis.  Neck:  supple,  No JVD  Respiratory: Breath sounds are right basal crackles   Cardiovascular: S1 and S2, IR IR no Murmurs, gallops or rubs  Gastrointestinal: Bowel Sounds present, soft, nontender.   Extremities: No peripheral edema. No clubbing or cyanosis.  Vascular: 2+ peripheral pulses  LEft BKA with prosthesis   Neurological: A/O x 3, no focal deficits  Musculoskeletal: no calf tenderness.  Skin: No rashes.      LABS: All Labs Reviewed:                        8.9    15.12 )-----------( 152      ( 27 Feb 2024 07:42 )             28.6                         11.0   10.95 )-----------( 189      ( 26 Feb 2024 09:13 )             36.0     27 Feb 2024 07:42    135    |  100    |  22     ----------------------------<  169    3.8     |  28     |  1.02   26 Feb 2024 09:13    134    |  98     |  13     ----------------------------<  130    3.8     |  35     |  0.91     Ca    8.9        27 Feb 2024 07:42  Ca    9.4        26 Feb 2024 09:13  Mg     1.6       26 Feb 2024 09:13    TPro  7.7    /  Alb  3.4    /  TBili  0.7    /  DBili  x      /  AST  11     /  ALT  11     /  AlkPhos  109    26 Feb 2024 09:13    PT/INR - ( 26 Feb 2024 10:17 )   PT: 12.3 sec;   INR: 1.09 ratio         PTT - ( 26 Feb 2024 10:17 )  PTT:29.7 sec    - TroponinI hsT: <-7.01, <-5.83    Blood Culture:         RADIOLOGY/EKG:  < from: 12 Lead ECG (02.26.24 @ 09:28) >    Diagnosis Line *** Critical Test Result: High HR  Atrial fibrillation with rapid ventricular response  Possible Anterior infarct (cited on or before 17-AUG-2017)  Abnormal ECG  Confirmed by Dominick Middleton ([id number]) on 2/26/2024 10:43:34 PM    < end of copied text >  < from: MYA Complete w/Spect and Color (11.20.23 @ 15:07) >     Normal left ventricular size and systolic function. Estimated LVEF is   60-65%.   Left atrial occlusion device present with small jose-device leak. No   evidence of jose-device thrombus.   Moderate mitral and tricuspid regurgitation.    < end of copied text >  < from: MYA Complete w/Spect and Color (11.20.23 @ 15:07) >  Mitral Valve   Moderate mitral regurgitation.     Aortic Valve   Normal aortic valve structure and function.     Tricuspid Valve   Moderate tricuspid valve regurgitation.     Left Atrium   There is a left atrial occlusion device present. Small jose-device leak.   No evidence of jose-device thrombus.   A saline contrast study was performed and showed no evidence of a patent   foramen ovale.     Left Ventricle   The left ventricle is normal in size, wall thickness, wall motion and   contractility.   Estimated LVEF 60-65%.    < end of copied text >  < from: Xray Chest 1 View- PORTABLE-Urgent (02.26.24 @ 10:11) >    INTERPRETATION:  AP erect chest on February 26, 2024 at 9:55 AM. Patient   has chest pain.    Heart is quite enlarged.    There is persistent CHF but it is somewhat improved from November 30, 2023.    IMPRESSION: Somewhat improved CHF from prior. There is a significant   residual.    --- End of Report ---        < end of copied text >    
  HPI:  67 y/o male with PMHX of chronic AFIB s/p watchmans, hx of ETOH abuse, cirrhosis, chronic diastolic CHF, COPD, chronic hypoxic respiratory failure s/p trach and PEG now decannulated on 4L home O2, left BKA, CVA 2023 on DAPT who presented to  with CC of SOB.  Patient notes SOB started last evening.  He felt increased WOB overnight and started with coughing.  Patient normally uses 4 L NC O2 continuous.  He had this on and was still feeling SOB.  Patient notes he was coughing up significant phlegm.  Patient presented to the ER for evaluation.  In the ER, patient found to be febrile-- temp 102.7.  Tachy 140's.  Tachynea with RR 26.  CXR with RLL PNA.  Admitted for respiratory failure/ PNA/ COPD exacerbation.      :  History as above. Developed acute sob yesterday. No fever or chills.      PAST MEDICAL & SURGICAL HISTORY:  Chronic atrial fibrillation  Alcohol abuse  CHF (congestive heart failure)  Cirrhosis  Emphysema of lung  Alcohol withdrawal  Collapsed lung  Meningitis  Falls  Anemia  Chronic obstructive pulmonary disease (COPD)  GERD (gastroesophageal reflux disease)  Hypertension  Presence of Watchman left atrial appendage closure device  Atrial fibrillation  COPD without exacerbation  Chronic CHF  S/P BKA (below knee amputation) unilateral, left  Presence of Watchman left atrial appendage closure device  Unilateral amputation of lower extremity below knee  H/O tracheostomy  now removed  S/P percutaneous endoscopic gastrostomy (PEG) tube placement  now removed        FAMILY HISTORY:  No pertinent family history in first degree relatives      Social History:    Lives in [facility].    No smoking.    Previous ETOH-- now quit.        Allergies:    Ceclor (Rash)  Duricef (Rash)       (2024 13:58)      PAST MEDICAL & SURGICAL HISTORY:  Chronic atrial fibrillation      Alcohol abuse      Poor historian      CHF (congestive heart failure)      Cirrhosis      Emphysema of lung      Alcohol withdrawal      Collapsed lung      Meningitis      Falls      Anemia      Chronic obstructive pulmonary disease (COPD)      GERD (gastroesophageal reflux disease)      Hypertension      Presence of Watchman left atrial appendage closure device      Atrial fibrillation      COPD without exacerbation      Chronic CHF      S/P BKA (below knee amputation) unilateral, left      Presence of Watchman left atrial appendage closure device      Unilateral amputation of lower extremity below knee      H/O tracheostomy  now removed      S/P percutaneous endoscopic gastrostomy (PEG) tube placement  now removed          MEDICATIONS  (STANDING):  albuterol/ipratropium for Nebulization 3 milliLiter(s) Nebulizer every 6 hours  aspirin enteric coated 81 milliGRAM(s) Oral daily  atorvastatin 80 milliGRAM(s) Oral at bedtime  budesonide 160 MICROgram(s)/formoterol 4.5 MICROgram(s) Inhaler 2 Puff(s) Inhalation two times a day  busPIRone 10 milliGRAM(s) Oral two times a day  clopidogrel Tablet 75 milliGRAM(s) Oral daily  diltiazem    Tablet 60 milliGRAM(s) Oral every 8 hours  doxazosin 1 milliGRAM(s) Oral at bedtime  doxycycline IVPB 100 milliGRAM(s) IV Intermittent every 12 hours  furosemide    Tablet 40 milliGRAM(s) Oral two times a day  gabapentin 400 milliGRAM(s) Oral three times a day  guaiFENesin ER 1200 milliGRAM(s) Oral every 12 hours  heparin   Injectable 5000 Unit(s) SubCutaneous every 12 hours  influenza  Vaccine (HIGH DOSE) 0.7 milliLiter(s) IntraMuscular once  methylPREDNISolone sodium succinate Injectable 40 milliGRAM(s) IV Push every 8 hours  metoprolol tartrate 25 milliGRAM(s) Oral two times a day  multivitamin 1 Tablet(s) Oral daily  pantoprazole    Tablet 40 milliGRAM(s) Oral before breakfast  piperacillin/tazobactam IVPB.. 3.375 Gram(s) IV Intermittent every 8 hours  QUEtiapine 100 milliGRAM(s) Oral at bedtime  spironolactone 50 milliGRAM(s) Oral daily    MEDICATIONS  (PRN):  acetaminophen     Tablet .. 650 milliGRAM(s) Oral every 6 hours PRN Temp greater or equal to 38C (100.4F), Mild Pain (1 - 3)  aluminum hydroxide/magnesium hydroxide/simethicone Suspension 30 milliLiter(s) Oral every 4 hours PRN Dyspepsia  melatonin 3 milliGRAM(s) Oral at bedtime PRN Insomnia  ondansetron Injectable 4 milliGRAM(s) IV Push every 8 hours PRN Nausea and/or Vomiting      Allergies    Ceclor (Rash)  Duricef (Rash)    Intolerances        SOCIAL HISTORY: Denies tobacco, etoh abuse or illicit drug use    FAMILY HISTORY:  No pertinent family history in first degree relatives        Vital Signs Last 24 Hrs  T(C): 36.7 (2024 20:08), Max: 39.3 (2024 09:36)  T(F): 98.1 (2024 20:08), Max: 102.7 (2024 09:36)  HR: 81 (2024 02:21) (78 - 145)  BP: 150/63 (2024 20:08) (97/40 - 162/142)  BP(mean): 79 (2024 17:43) (58 - 105)  RR: 22 (2024 20:08) (22 - 29)  SpO2: 96% (2024 02:21) (83% - 100%)    Parameters below as of 2024 02:21  Patient On (Oxygen Delivery Method): nasal cannula, 4 Lpm        REVIEW OF SYSTEMS:    CONSTITUTIONAL:  As per HPI.  SKIN: no rashes  HEENT:  Eyes:  No diplopia or blurred vision. ENT:  No earache, sore throat or runny nose.  CARDIOVASCULAR:  No pressure, squeezing, tightness, heaviness or aching about the chest, neck, axilla or epigastrium.  RESPIRATORY:  sob  GASTROINTESTINAL:  No nausea, vomiting or diarrhea.  GENITOURINARY:  No dysuria, frequency or urgency.  MUSCULOSKELETAL:  As per HPI.  SKIN:  No change in skin, hair or nails.  NEUROLOGIC:  No paresthesias, fasciculations, seizures or weakness.  PSYCHIATRIC:  No disorder of thought or mood.  ENDOCRINE:  No heat or cold intolerance, polyuria or polydipsia.  HEMATOLOGICAL:  No easy bruising or bleedings:  .     PHYSICAL EXAMINATION:    GENERAL APPEARANCE:  Pt. is not currently dyspneic.  HEENT:  Pupils are normal and react normally. No icterus. Mucous membranes well colored.  NECK:  Supple. No lymphadenopathy. Jugular venous pressure not elevated. Carotids equal.   HEART:   S1S2 irreg  CHEST:  bilat inspiratory crackles 1/3 to 1/2  ABDOMEN:  Soft and nontender.   EXTREMITIES:  left BKA; no RLE edema  SKIN:  No rash or significant lesions are noted.    LABS:                        8.9    15.12 )-----------( 152      ( 2024 07:42 )             28.6         134<L>  |  98  |  13  ----------------------------<  130<H>  3.8   |  35<H>  |  0.91    Ca    9.4      2024 09:13  Mg     1.6         TPro  7.7  /  Alb  3.4  /  TBili  0.7  /  DBili  x   /  AST  11<L>  /  ALT  11<L>  /  AlkPhos  109      LIVER FUNCTIONS - ( 2024 09:13 )  Alb: 3.4 g/dL / Pro: 7.7 gm/dL / ALK PHOS: 109 U/L / ALT: 11 U/L / AST: 11 U/L / GGT: x           PT/INR - ( 2024 10:17 )   PT: 12.3 sec;   INR: 1.09 ratio         PTT - ( 2024 10:17 )  PTT:29.7 sec      Urinalysis Basic - ( 2024 13:52 )    Color: Yellow / Appearance: Clear / S.022 / pH: x  Gluc: x / Ketone: Trace mg/dL  / Bili: Negative / Urobili: 1.0 mg/dL   Blood: x / Protein: Negative mg/dL / Nitrite: Negative   Leuk Esterase: Negative / RBC: x / WBC x   Sq Epi: x / Non Sq Epi: x / Bacteria: x          RADIOLOGY & ADDITIONAL STUDIES:

## 2024-02-27 NOTE — CONSULT NOTE ADULT - ASSESSMENT
- cont O2 as needed  - had acute onset of sob yesterday w no cough, fever or chills  - cxr more consistent w chf and has bilat crackles on exam  - Last MYA showed EF 60%. Probable diastolic dysfunction  - cont diuresis  - would d/c abx  - will lower iv steroids; cont symbicort

## 2024-02-27 NOTE — PROGRESS NOTE ADULT - ASSESSMENT
69 y/o male with PMHX of chronic AFIB s/p watchmans, hx of ETOH abuse, cirrhosis, chronic diastolic CHF, COPD, chronic hypoxic respiratory failure s/p trach/ peg now decannulated on 4L home O2, left BKA, CVA Nov 2023 on DAPT who presented to  with CC of SOB.  In the ER, patient found to be febrile-- temp 102.7.  Tachy 140's.  CXR with RLL PNA.  Admitted for respiratory failure/ PNA/ COPD exacerbation.      #Acute on Chronic Respiratory Failure secondary to Sepsis secondary to PNA/ COPD Exacerbation:    Admit to medsurg.    CXR reviewed-- b/l PNA-- worse on right.    IV ABX-- zosyn and doxy.    S/p vanco IV in the ER.    F/u pancultures.    Check sputum cx.    Check strep/ legionella AG.    Mucinex BID.    IV steroids/ nebs/ inhalers.    Follow RR/ O2 sats-- cont 4L NC.    Fever/ tachycardia/ lactic acidosis improved with 2L NSS And IV ABX.    Trend labs/ vitals.    Pulm eval-- follows with Dr. Mckenna.      #Rapid AFIB with RVR:    Suspect related to hypoxia/ PNA/ fever.    S/p watchmans.  No AC.    Cont metoprolol/ cardizem.      #Recent CVA Nov 2023:    Cont DAPT for now.    Unclear if patient was supposed to stop plavix after 21 days-- cardio f/u.      #Hx of cirrhosis/ ETOH:    Cont lasix/ spironolactone.      #Chronic diastolic CHF:    No signs/ sx of fluid overload at present.  Euvolemic.    Cont home meds.      #DVT proph:  heparin SQ.      D/w daughter in detail.  Please call with updates.  Litzy Boone [phone].     67 y/o male with PMHX of chronic AFIB s/p watchmans, hx of ETOH abuse, cirrhosis, chronic diastolic CHF, COPD, chronic hypoxic respiratory failure s/p trach/ peg now decannulated on 4L home O2, left BKA, CVA Nov 2023 on DAPT who presented to  with CC of SOB.  In the ER, patient found to be febrile-- temp 102.7.  Tachy 140's.  CXR with RLL PNA.  Admitted for respiratory failure/ PNA/ COPD exacerbation.      #Acute on Chronic Respiratory Failure secondary to Sepsis secondary to PNA/ COPD Exacerbation:    Admit to [facility].    CXR reviewed-- b/l PNA-- worse on right.    IV ABX-- zosyn and doxy.    S/p vanco IV in the ER.    Blood cultures No growth x 24 hours   Check sputum cx.    Check strep/ legionella AG.  - pending   Mucinex BID.    IV steroids/ nebs/ inhalers.    Follow RR/ O2 sats-- cont 4L NC.    Fever/ tachycardia/ lactic acidosis improved with 2L NSS And IV ABX.    Trend labs/ vitals.    Pulm eval-- follows with Dr. Mckenna.      #Rapid AFIB with RVR:    Suspect related to hypoxia/ PNA/ fever.    S/p watchmans.  No AC.    Cont metoprolol/ cardizem.      #Recent CVA Nov 2023:    Cont DAPT for now.        #Hx of cirrhosis/ ETOH:    Cont lasix/ spironolactone.      #Chronic diastolic CHF:    No signs/ sx of fluid overload at present.  Euvolemic.    Cont home meds.    oral Lasix 40mg BID  - 1 extra dose IV lasix today    #DVT proph:  heparin SQ.      D/w daughter in detail.  Please call with updates.  Litzy Boone [phone].     67 y/o male with PMHX of chronic AFIB s/p watchmans, hx of ETOH abuse, cirrhosis, chronic diastolic CHF, COPD, chronic hypoxic respiratory failure s/p trach/ peg now decannulated on 4L home O2, left BKA, CVA Nov 2023 on DAPT who presented to  with CC of SOB.  In the ER, patient found to be febrile-- temp 102.7.  Tachy 140's.  CXR with RLL PNA.  Admitted for respiratory failure/ PNA/ COPD exacerbation.      #Acute on Chronic Respiratory Failure secondary to Sepsis secondary to PNA/ COPD Exacerbation:    Admit to [facility].    CXR reviewed-- b/l PNA-- worse on right.    IV ABX-- zosyn and doxy.    S/p vanco IV in the ER.    Blood cultures No growth x 24 hours   Check sputum cx.    Check strep/ legionella AG.  - pending   Mucinex BID.    IV steroids/ nebs/ inhalers.    Follow RR/ O2 sats-- cont 4L NC.    Fever/ tachycardia/ lactic acidosis improved with 2L NSS And IV ABX.    Trend labs/ vitals.    Pulm eval-- follows with Dr. Mckenna.      #Rapid AFIB with RVR:    Suspect related to hypoxia/ PNA/ fever.    S/p watchmans.  No AC.    Cont metoprolol/ cardizem.      #Recent CVA Nov 2023:    Cont DAPT for now.        #Hx of cirrhosis/ ETOH:    Cont lasix/ spironolactone.      #Chronic diastolic CHF:    No signs/ sx of fluid overload at present.  Euvolemic.    Cont home meds.    oral Lasix 40mg BID  - 1 extra dose IV lasix today    #DVT proph:  heparin SQ.       daughter -  Litzy Boone [phone].

## 2024-02-27 NOTE — CONSULT NOTE ADULT - PROBLEM SELECTOR RECOMMENDATION 9
multifactorial predominantly pulmonary exacerbation of COPD with pneumonia ,fever , diastolic heart failure with afib rapid rate , responded well to bronchodilators and steroids, continue diuretic  IV one dose 40 mg now ,  CXR showed vascular congestion , IN antibiotics

## 2024-02-27 NOTE — CONSULT NOTE ADULT - PROBLEM SELECTOR RECOMMENDATION 4
as above , on home oxygen ,  secondary  pulmonary hypertension  secondary to copd and diastolic heart failure   as per pulmonary recommendations

## 2024-02-27 NOTE — CONSULT NOTE ADULT - PROBLEM SELECTOR PROBLEM 4
Morbid obesity with alveolar hypoventilation
Chronic obstructive pulmonary disease, unspecified COPD type

## 2024-02-28 LAB
ANION GAP SERPL CALC-SCNC: 2 MMOL/L — LOW (ref 5–17)
BUN SERPL-MCNC: 25 MG/DL — HIGH (ref 7–23)
CALCIUM SERPL-MCNC: 9.1 MG/DL — SIGNIFICANT CHANGE UP (ref 8.5–10.1)
CHLORIDE SERPL-SCNC: 101 MMOL/L — SIGNIFICANT CHANGE UP (ref 96–108)
CO2 SERPL-SCNC: 31 MMOL/L — SIGNIFICANT CHANGE UP (ref 22–31)
CREAT SERPL-MCNC: 0.84 MG/DL — SIGNIFICANT CHANGE UP (ref 0.5–1.3)
EGFR: 96 ML/MIN/1.73M2 — SIGNIFICANT CHANGE UP
GLUCOSE SERPL-MCNC: 151 MG/DL — HIGH (ref 70–99)
HCT VFR BLD CALC: 28.4 % — LOW (ref 39–50)
HGB BLD-MCNC: 8.5 G/DL — LOW (ref 13–17)
MCHC RBC-ENTMCNC: 25.4 PG — LOW (ref 27–34)
MCHC RBC-ENTMCNC: 29.9 GM/DL — LOW (ref 32–36)
MCV RBC AUTO: 84.8 FL — SIGNIFICANT CHANGE UP (ref 80–100)
PLATELET # BLD AUTO: 177 K/UL — SIGNIFICANT CHANGE UP (ref 150–400)
POTASSIUM SERPL-MCNC: 3.9 MMOL/L — SIGNIFICANT CHANGE UP (ref 3.5–5.3)
POTASSIUM SERPL-SCNC: 3.9 MMOL/L — SIGNIFICANT CHANGE UP (ref 3.5–5.3)
RBC # BLD: 3.35 M/UL — LOW (ref 4.2–5.8)
RBC # FLD: 14.8 % — HIGH (ref 10.3–14.5)
SODIUM SERPL-SCNC: 134 MMOL/L — LOW (ref 135–145)
WBC # BLD: 14.77 K/UL — HIGH (ref 3.8–10.5)
WBC # FLD AUTO: 14.77 K/UL — HIGH (ref 3.8–10.5)

## 2024-02-28 PROCEDURE — 99233 SBSQ HOSP IP/OBS HIGH 50: CPT

## 2024-02-28 PROCEDURE — 99232 SBSQ HOSP IP/OBS MODERATE 35: CPT

## 2024-02-28 RX ORDER — FUROSEMIDE 40 MG
40 TABLET ORAL ONCE
Refills: 0 | Status: COMPLETED | OUTPATIENT
Start: 2024-02-28 | End: 2024-02-28

## 2024-02-28 RX ADMIN — HEPARIN SODIUM 5000 UNIT(S): 5000 INJECTION INTRAVENOUS; SUBCUTANEOUS at 10:34

## 2024-02-28 RX ADMIN — Medication 1 MILLIGRAM(S): at 20:23

## 2024-02-28 RX ADMIN — Medication 10 MILLIGRAM(S): at 20:22

## 2024-02-28 RX ADMIN — Medication 25 MILLIGRAM(S): at 20:25

## 2024-02-28 RX ADMIN — HEPARIN SODIUM 5000 UNIT(S): 5000 INJECTION INTRAVENOUS; SUBCUTANEOUS at 20:22

## 2024-02-28 RX ADMIN — BUDESONIDE AND FORMOTEROL FUMARATE DIHYDRATE 2 PUFF(S): 160; 4.5 AEROSOL RESPIRATORY (INHALATION) at 09:26

## 2024-02-28 RX ADMIN — Medication 40 MILLIGRAM(S): at 20:13

## 2024-02-28 RX ADMIN — Medication 81 MILLIGRAM(S): at 10:34

## 2024-02-28 RX ADMIN — PIPERACILLIN AND TAZOBACTAM 25 GRAM(S): 4; .5 INJECTION, POWDER, LYOPHILIZED, FOR SOLUTION INTRAVENOUS at 22:52

## 2024-02-28 RX ADMIN — Medication 1200 MILLIGRAM(S): at 20:22

## 2024-02-28 RX ADMIN — GABAPENTIN 400 MILLIGRAM(S): 400 CAPSULE ORAL at 20:12

## 2024-02-28 RX ADMIN — Medication 3 MILLILITER(S): at 02:50

## 2024-02-28 RX ADMIN — Medication 25 MILLIGRAM(S): at 10:34

## 2024-02-28 RX ADMIN — Medication 3 MILLILITER(S): at 09:25

## 2024-02-28 RX ADMIN — BUDESONIDE AND FORMOTEROL FUMARATE DIHYDRATE 2 PUFF(S): 160; 4.5 AEROSOL RESPIRATORY (INHALATION) at 20:45

## 2024-02-28 RX ADMIN — Medication 40 MILLIGRAM(S): at 05:36

## 2024-02-28 RX ADMIN — Medication 60 MILLIGRAM(S): at 20:22

## 2024-02-28 RX ADMIN — Medication 1 TABLET(S): at 10:35

## 2024-02-28 RX ADMIN — Medication 10 MILLIGRAM(S): at 10:35

## 2024-02-28 RX ADMIN — GABAPENTIN 400 MILLIGRAM(S): 400 CAPSULE ORAL at 14:35

## 2024-02-28 RX ADMIN — QUETIAPINE FUMARATE 100 MILLIGRAM(S): 200 TABLET, FILM COATED ORAL at 20:22

## 2024-02-28 RX ADMIN — ATORVASTATIN CALCIUM 80 MILLIGRAM(S): 80 TABLET, FILM COATED ORAL at 20:21

## 2024-02-28 RX ADMIN — Medication 60 MILLIGRAM(S): at 14:35

## 2024-02-28 RX ADMIN — PIPERACILLIN AND TAZOBACTAM 25 GRAM(S): 4; .5 INJECTION, POWDER, LYOPHILIZED, FOR SOLUTION INTRAVENOUS at 15:59

## 2024-02-28 RX ADMIN — Medication 60 MILLIGRAM(S): at 05:36

## 2024-02-28 RX ADMIN — Medication 40 MILLIGRAM(S): at 10:34

## 2024-02-28 RX ADMIN — PANTOPRAZOLE SODIUM 40 MILLIGRAM(S): 20 TABLET, DELAYED RELEASE ORAL at 05:36

## 2024-02-28 RX ADMIN — SENNA PLUS 2 TABLET(S): 8.6 TABLET ORAL at 20:21

## 2024-02-28 RX ADMIN — SPIRONOLACTONE 50 MILLIGRAM(S): 25 TABLET, FILM COATED ORAL at 10:35

## 2024-02-28 RX ADMIN — CLOPIDOGREL BISULFATE 75 MILLIGRAM(S): 75 TABLET, FILM COATED ORAL at 10:35

## 2024-02-28 RX ADMIN — Medication 3 MILLILITER(S): at 13:37

## 2024-02-28 RX ADMIN — PIPERACILLIN AND TAZOBACTAM 25 GRAM(S): 4; .5 INJECTION, POWDER, LYOPHILIZED, FOR SOLUTION INTRAVENOUS at 05:31

## 2024-02-28 RX ADMIN — Medication 1200 MILLIGRAM(S): at 10:35

## 2024-02-28 RX ADMIN — GABAPENTIN 400 MILLIGRAM(S): 400 CAPSULE ORAL at 05:33

## 2024-02-28 RX ADMIN — Medication 40 MILLIGRAM(S): at 14:35

## 2024-02-28 RX ADMIN — Medication 110 MILLIGRAM(S): at 13:23

## 2024-02-28 RX ADMIN — Medication 3 MILLILITER(S): at 20:44

## 2024-02-28 NOTE — PROGRESS NOTE ADULT - ASSESSMENT
67 y/o male with PMHX of chronic AFIB s/p watchmans, hx of ETOH abuse, cirrhosis, chronic diastolic CHF, COPD, chronic hypoxic respiratory failure s/p trach/ peg now decannulated on 4L home O2, left BKA, CVA Nov 2023 on DAPT who presented to  with CC of SOB.  In the ER, patient found to be febrile-- temp 102.7.  Tachy 140's.  CXR with RLL PNA.  Admitted for respiratory failure/ PNA/ COPD exacerbation.      #Acute on Chronic Respiratory Failure secondary to Sepsis secondary to PNA/ COPD Exacerbation:    -CXR:: Reviewed  -Zosyn and Doxy  -Blood cultures: No growth to date  -IV steroids/ nebs/ inhalers.    -supportive management   -suppplemental oxygen PRN with target SpO2>92%]  -pulmonary following       #Rapid AFIB with RVR:    Suspect related to hypoxia/ PNA/ fever.    S/p watchmans.  No AC.    Cont metoprolol/ cardizem.      #Recent CVA Nov 2023:    Cont DAPT for now.        #Hx of cirrhosis/ ETOH:    Cont lasix/ spironolactone.      #Chronic diastolic CHF:    No signs/ sx of fluid overload at present.  Euvolemic.    Cont home meds.    oral Lasix 40mg BID      #DVT proph:  heparin SQ.       daughter -  Litzy Boone [phone].

## 2024-02-28 NOTE — PROGRESS NOTE ADULT - ASSESSMENT
- cont O2 as needed  - had acute onset of sob yesterday w no cough, fever or chills  - cxr more consistent w chf and has bilat crackles on exam  - Last MYA showed EF 60%. Probable diastolic dysfunction  - cont diuresis  - remains on abx  - cont symbicort/steroids  - hg down to 8.5

## 2024-02-29 LAB
ANION GAP SERPL CALC-SCNC: 5 MMOL/L — SIGNIFICANT CHANGE UP (ref 5–17)
BUN SERPL-MCNC: 28 MG/DL — HIGH (ref 7–23)
CALCIUM SERPL-MCNC: 8.7 MG/DL — SIGNIFICANT CHANGE UP (ref 8.5–10.1)
CHLORIDE SERPL-SCNC: 101 MMOL/L — SIGNIFICANT CHANGE UP (ref 96–108)
CO2 SERPL-SCNC: 31 MMOL/L — SIGNIFICANT CHANGE UP (ref 22–31)
CREAT SERPL-MCNC: 0.9 MG/DL — SIGNIFICANT CHANGE UP (ref 0.5–1.3)
EGFR: 94 ML/MIN/1.73M2 — SIGNIFICANT CHANGE UP
GLUCOSE SERPL-MCNC: 120 MG/DL — HIGH (ref 70–99)
HCT VFR BLD CALC: 29.6 % — LOW (ref 39–50)
HGB BLD-MCNC: 9 G/DL — LOW (ref 13–17)
MCHC RBC-ENTMCNC: 25.7 PG — LOW (ref 27–34)
MCHC RBC-ENTMCNC: 30.4 GM/DL — LOW (ref 32–36)
MCV RBC AUTO: 84.6 FL — SIGNIFICANT CHANGE UP (ref 80–100)
PLATELET # BLD AUTO: 172 K/UL — SIGNIFICANT CHANGE UP (ref 150–400)
POTASSIUM SERPL-MCNC: 3.5 MMOL/L — SIGNIFICANT CHANGE UP (ref 3.5–5.3)
POTASSIUM SERPL-SCNC: 3.5 MMOL/L — SIGNIFICANT CHANGE UP (ref 3.5–5.3)
RBC # BLD: 3.5 M/UL — LOW (ref 4.2–5.8)
RBC # FLD: 15 % — HIGH (ref 10.3–14.5)
SODIUM SERPL-SCNC: 137 MMOL/L — SIGNIFICANT CHANGE UP (ref 135–145)
WBC # BLD: 11.65 K/UL — HIGH (ref 3.8–10.5)
WBC # FLD AUTO: 11.65 K/UL — HIGH (ref 3.8–10.5)

## 2024-02-29 PROCEDURE — 99233 SBSQ HOSP IP/OBS HIGH 50: CPT

## 2024-02-29 PROCEDURE — 99232 SBSQ HOSP IP/OBS MODERATE 35: CPT

## 2024-02-29 RX ORDER — FUROSEMIDE 40 MG
40 TABLET ORAL ONCE
Refills: 0 | Status: COMPLETED | OUTPATIENT
Start: 2024-02-29 | End: 2024-02-29

## 2024-02-29 RX ADMIN — Medication 40 MILLIGRAM(S): at 13:51

## 2024-02-29 RX ADMIN — Medication 3 MILLILITER(S): at 11:15

## 2024-02-29 RX ADMIN — GABAPENTIN 400 MILLIGRAM(S): 400 CAPSULE ORAL at 13:47

## 2024-02-29 RX ADMIN — Medication 60 MILLIGRAM(S): at 05:02

## 2024-02-29 RX ADMIN — Medication 3 MILLILITER(S): at 15:28

## 2024-02-29 RX ADMIN — Medication 60 MILLIGRAM(S): at 21:50

## 2024-02-29 RX ADMIN — Medication 60 MILLIGRAM(S): at 13:47

## 2024-02-29 RX ADMIN — Medication 81 MILLIGRAM(S): at 10:44

## 2024-02-29 RX ADMIN — Medication 1200 MILLIGRAM(S): at 21:50

## 2024-02-29 RX ADMIN — Medication 40 MILLIGRAM(S): at 05:02

## 2024-02-29 RX ADMIN — Medication 650 MILLIGRAM(S): at 11:39

## 2024-02-29 RX ADMIN — Medication 40 MILLIGRAM(S): at 10:44

## 2024-02-29 RX ADMIN — GABAPENTIN 400 MILLIGRAM(S): 400 CAPSULE ORAL at 05:02

## 2024-02-29 RX ADMIN — HEPARIN SODIUM 5000 UNIT(S): 5000 INJECTION INTRAVENOUS; SUBCUTANEOUS at 21:50

## 2024-02-29 RX ADMIN — PIPERACILLIN AND TAZOBACTAM 25 GRAM(S): 4; .5 INJECTION, POWDER, LYOPHILIZED, FOR SOLUTION INTRAVENOUS at 05:03

## 2024-02-29 RX ADMIN — Medication 3 MILLILITER(S): at 01:45

## 2024-02-29 RX ADMIN — Medication 110 MILLIGRAM(S): at 13:47

## 2024-02-29 RX ADMIN — Medication 1 MILLIGRAM(S): at 21:49

## 2024-02-29 RX ADMIN — Medication 10 MILLIGRAM(S): at 10:47

## 2024-02-29 RX ADMIN — Medication 25 MILLIGRAM(S): at 10:44

## 2024-02-29 RX ADMIN — Medication 1 TABLET(S): at 10:44

## 2024-02-29 RX ADMIN — SENNA PLUS 2 TABLET(S): 8.6 TABLET ORAL at 21:50

## 2024-02-29 RX ADMIN — BUDESONIDE AND FORMOTEROL FUMARATE DIHYDRATE 2 PUFF(S): 160; 4.5 AEROSOL RESPIRATORY (INHALATION) at 20:18

## 2024-02-29 RX ADMIN — Medication 110 MILLIGRAM(S): at 00:48

## 2024-02-29 RX ADMIN — PIPERACILLIN AND TAZOBACTAM 25 GRAM(S): 4; .5 INJECTION, POWDER, LYOPHILIZED, FOR SOLUTION INTRAVENOUS at 13:46

## 2024-02-29 RX ADMIN — Medication 3 MILLILITER(S): at 20:18

## 2024-02-29 RX ADMIN — Medication 10 MILLIGRAM(S): at 21:49

## 2024-02-29 RX ADMIN — SPIRONOLACTONE 50 MILLIGRAM(S): 25 TABLET, FILM COATED ORAL at 10:45

## 2024-02-29 RX ADMIN — Medication 40 MILLIGRAM(S): at 18:39

## 2024-02-29 RX ADMIN — GABAPENTIN 400 MILLIGRAM(S): 400 CAPSULE ORAL at 21:49

## 2024-02-29 RX ADMIN — ATORVASTATIN CALCIUM 80 MILLIGRAM(S): 80 TABLET, FILM COATED ORAL at 21:49

## 2024-02-29 RX ADMIN — HEPARIN SODIUM 5000 UNIT(S): 5000 INJECTION INTRAVENOUS; SUBCUTANEOUS at 10:47

## 2024-02-29 RX ADMIN — QUETIAPINE FUMARATE 100 MILLIGRAM(S): 200 TABLET, FILM COATED ORAL at 21:49

## 2024-02-29 RX ADMIN — PIPERACILLIN AND TAZOBACTAM 25 GRAM(S): 4; .5 INJECTION, POWDER, LYOPHILIZED, FOR SOLUTION INTRAVENOUS at 21:48

## 2024-02-29 RX ADMIN — Medication 1200 MILLIGRAM(S): at 10:44

## 2024-02-29 RX ADMIN — Medication 25 MILLIGRAM(S): at 21:50

## 2024-02-29 RX ADMIN — BUDESONIDE AND FORMOTEROL FUMARATE DIHYDRATE 2 PUFF(S): 160; 4.5 AEROSOL RESPIRATORY (INHALATION) at 11:15

## 2024-02-29 RX ADMIN — CLOPIDOGREL BISULFATE 75 MILLIGRAM(S): 75 TABLET, FILM COATED ORAL at 10:45

## 2024-02-29 NOTE — PROGRESS NOTE ADULT - ASSESSMENT
69 y/o male with PMHX of chronic AFIB s/p watchmans, hx of ETOH abuse, cirrhosis, chronic diastolic CHF, COPD, chronic hypoxic respiratory failure s/p trach/ peg now decannulated on 4L home O2, left BKA, CVA Nov 2023 on DAPT who presented to  with CC of SOB.  In the ER, patient found to be febrile-- temp 102.7.  Tachy 140's.  CXR with RLL PNA.  Admitted for respiratory failure/ PNA/ COPD exacerbation.      #Acute on Chronic Respiratory Failure secondary to Sepsis secondary to PNA/ COPD Exacerbation:    -CXR:: Reviewed  -Zosyn and Doxy  -Blood cultures: No growth to date  -IV steroids/ nebs/ inhalers.    -supportive management   -suppplemental oxygen PRN with target SpO2>92%]  -pulmonary following       #Rapid AFIB with RVR:    Suspect related to hypoxia/ PNA/ fever.    S/p watchmans.  No AC.    Cont metoprolol/ cardizem.      #Recent CVA Nov 2023:    Cont DAPT for now.        #Hx of cirrhosis/ ETOH:    Cont lasix/ spironolactone.      #Chronic diastolic CHF:    c/w Lasix 40mg IV daily         #DVT proph:  heparin SQ.       daughter -  Litzy Boone [phone].

## 2024-02-29 NOTE — PHYSICAL THERAPY INITIAL EVALUATION ADULT - ADDITIONAL COMMENTS
Straight cane for ambulation within room at [facility]. Scooter for mobilizing off unit, to/from dining room. Patient resides on second floor with elevator access.

## 2024-02-29 NOTE — PHYSICAL THERAPY INITIAL EVALUATION ADULT - ACTIVE RANGE OF MOTION EXAMINATION, REHAB EVAL
Left LE hip flex WNL, left knee ext/flex ~0-~80 degrees./Left UE Active ROM was WFL (within functional limits)/Right UE Active ROM was WFL (within functional limits)/bilateral upper extremity Active ROM was WFL (within functional limits)/Left LE Active ROM was WFL (within functional limits) bilateral lower extremity Active ROM was WNL (within normal limits)/bilateral  lower extremity Active ROM was WFL (within functional limits)

## 2024-02-29 NOTE — PHYSICAL THERAPY INITIAL EVALUATION ADULT - PERTINENT HX OF CURRENT PROBLEM, REHAB EVAL
69 y/o male with PMHX of chronic AFIB s/p watchmans, hx of ETOH abuse, cirrhosis, chronic diastolic CHF, COPD, chronic hypoxic respiratory failure s/p trach/ peg now decannulated on 4L home O2, left BKA, CVA Nov 2023 on DAPT who presented to  with CC of SOB.  In the ER, patient found to be febrile-- temp 102.7.  Tachy 140's.  CXR with RLL PNA.  Admitted for respiratory failure/ PNA/ COPD exacerbation.

## 2024-02-29 NOTE — PHYSICAL THERAPY INITIAL EVALUATION ADULT - GENERAL OBSERVATIONS, REHAB EVAL
The Pt was received on 3E up in bedside chair, calm, cooperative, and willing to participate with PT, +L prosthesis equipped, +tele. Pt responded well to functional mobility trng and ambulation tx. Required SBA for mobility and SBA/CGA to ambulate 75ft with RW support and verbal cues for proper posture when utilizing RW. Assisted into bedside chair and adjusted for comfort, +alarm. The Pt was in NAD at end of tx.  Call bell, tray, and phone in place and within reach. NSG made aware.

## 2024-03-01 ENCOUNTER — TRANSCRIPTION ENCOUNTER (OUTPATIENT)
Age: 69
End: 2024-03-01

## 2024-03-01 VITALS
RESPIRATION RATE: 18 BRPM | SYSTOLIC BLOOD PRESSURE: 123 MMHG | TEMPERATURE: 99 F | HEART RATE: 93 BPM | OXYGEN SATURATION: 96 % | DIASTOLIC BLOOD PRESSURE: 58 MMHG

## 2024-03-01 LAB
ANION GAP SERPL CALC-SCNC: 6 MMOL/L — SIGNIFICANT CHANGE UP (ref 5–17)
BUN SERPL-MCNC: 23 MG/DL — SIGNIFICANT CHANGE UP (ref 7–23)
CALCIUM SERPL-MCNC: 8.7 MG/DL — SIGNIFICANT CHANGE UP (ref 8.5–10.1)
CHLORIDE SERPL-SCNC: 98 MMOL/L — SIGNIFICANT CHANGE UP (ref 96–108)
CO2 SERPL-SCNC: 31 MMOL/L — SIGNIFICANT CHANGE UP (ref 22–31)
CREAT SERPL-MCNC: 0.86 MG/DL — SIGNIFICANT CHANGE UP (ref 0.5–1.3)
EGFR: 96 ML/MIN/1.73M2 — SIGNIFICANT CHANGE UP
GLUCOSE SERPL-MCNC: 116 MG/DL — HIGH (ref 70–99)
HCT VFR BLD CALC: 32.5 % — LOW (ref 39–50)
HGB BLD-MCNC: 9.6 G/DL — LOW (ref 13–17)
MCHC RBC-ENTMCNC: 25.3 PG — LOW (ref 27–34)
MCHC RBC-ENTMCNC: 29.5 GM/DL — LOW (ref 32–36)
MCV RBC AUTO: 85.5 FL — SIGNIFICANT CHANGE UP (ref 80–100)
NT-PROBNP SERPL-SCNC: 1247 PG/ML — HIGH (ref 0–125)
PLATELET # BLD AUTO: 168 K/UL — SIGNIFICANT CHANGE UP (ref 150–400)
POTASSIUM SERPL-MCNC: 3.9 MMOL/L — SIGNIFICANT CHANGE UP (ref 3.5–5.3)
POTASSIUM SERPL-SCNC: 3.9 MMOL/L — SIGNIFICANT CHANGE UP (ref 3.5–5.3)
PROCALCITONIN SERPL-MCNC: 0.33 NG/ML — HIGH (ref 0.02–0.1)
RBC # BLD: 3.8 M/UL — LOW (ref 4.2–5.8)
RBC # FLD: 15 % — HIGH (ref 10.3–14.5)
SODIUM SERPL-SCNC: 135 MMOL/L — SIGNIFICANT CHANGE UP (ref 135–145)
WBC # BLD: 8.47 K/UL — SIGNIFICANT CHANGE UP (ref 3.8–10.5)
WBC # FLD AUTO: 8.47 K/UL — SIGNIFICANT CHANGE UP (ref 3.8–10.5)

## 2024-03-01 PROCEDURE — 99233 SBSQ HOSP IP/OBS HIGH 50: CPT

## 2024-03-01 PROCEDURE — 99239 HOSP IP/OBS DSCHRG MGMT >30: CPT

## 2024-03-01 RX ORDER — POLYETHYLENE GLYCOL 3350 17 G/17G
17 POWDER, FOR SOLUTION ORAL
Qty: 0 | Refills: 0 | DISCHARGE
Start: 2024-03-01

## 2024-03-01 RX ADMIN — PIPERACILLIN AND TAZOBACTAM 25 GRAM(S): 4; .5 INJECTION, POWDER, LYOPHILIZED, FOR SOLUTION INTRAVENOUS at 05:23

## 2024-03-01 RX ADMIN — Medication 40 MILLIGRAM(S): at 13:07

## 2024-03-01 RX ADMIN — Medication 60 MILLIGRAM(S): at 05:22

## 2024-03-01 RX ADMIN — PANTOPRAZOLE SODIUM 40 MILLIGRAM(S): 20 TABLET, DELAYED RELEASE ORAL at 05:22

## 2024-03-01 RX ADMIN — SPIRONOLACTONE 50 MILLIGRAM(S): 25 TABLET, FILM COATED ORAL at 09:23

## 2024-03-01 RX ADMIN — Medication 40 MILLIGRAM(S): at 09:24

## 2024-03-01 RX ADMIN — Medication 3 MILLILITER(S): at 09:47

## 2024-03-01 RX ADMIN — GABAPENTIN 400 MILLIGRAM(S): 400 CAPSULE ORAL at 05:22

## 2024-03-01 RX ADMIN — Medication 1 TABLET(S): at 09:24

## 2024-03-01 RX ADMIN — Medication 3 MILLILITER(S): at 14:50

## 2024-03-01 RX ADMIN — HEPARIN SODIUM 5000 UNIT(S): 5000 INJECTION INTRAVENOUS; SUBCUTANEOUS at 09:24

## 2024-03-01 RX ADMIN — Medication 25 MILLIGRAM(S): at 09:24

## 2024-03-01 RX ADMIN — Medication 1200 MILLIGRAM(S): at 09:26

## 2024-03-01 RX ADMIN — GABAPENTIN 400 MILLIGRAM(S): 400 CAPSULE ORAL at 13:07

## 2024-03-01 RX ADMIN — Medication 110 MILLIGRAM(S): at 00:15

## 2024-03-01 RX ADMIN — Medication 3 MILLILITER(S): at 01:38

## 2024-03-01 RX ADMIN — CLOPIDOGREL BISULFATE 75 MILLIGRAM(S): 75 TABLET, FILM COATED ORAL at 09:23

## 2024-03-01 RX ADMIN — Medication 60 MILLIGRAM(S): at 13:08

## 2024-03-01 RX ADMIN — BUDESONIDE AND FORMOTEROL FUMARATE DIHYDRATE 2 PUFF(S): 160; 4.5 AEROSOL RESPIRATORY (INHALATION) at 09:47

## 2024-03-01 RX ADMIN — Medication 81 MILLIGRAM(S): at 09:23

## 2024-03-01 RX ADMIN — Medication 10 MILLIGRAM(S): at 09:23

## 2024-03-01 RX ADMIN — Medication 40 MILLIGRAM(S): at 05:22

## 2024-03-01 NOTE — PROGRESS NOTE ADULT - SUBJECTIVE AND OBJECTIVE BOX
HOSPITALIST ATTENDING PROGRESS NOTE     Chart and meds reviewed. Patient seen and examined     CC: SOB    Subjective: Pt seen and evaluated. Still SOB. Still has some edema. No other acute complaints.       All other systems and founds to be negative with exception of what has been described above.         PHYSICAL EXAM:  Vital Signs Last 24 Hrs  T(C): 36.9 (2024 20:28), Max: 36.9 (2024 20:28)  T(F): 98.5 (2024 20:28), Max: 98.5 (2024 20:28)  HR: 97 (2024 07:52) (77 - 97)  BP: 119/83 (2024 07:52) (103/56 - 119/83)  RR: 18 (2024 07:52) (18 - 18)  SpO2: 97% (2024 07:52) (97% - 98%)    Parameters below as of 2024 07:52  Patient On (Oxygen Delivery Method): nasal cannula  O2 Flow (L/min): 4    Daily     Daily Weight in k.5 (2024 10:40)    Daily     Daily Weight in k (2024 06:10)    PHYSICAL EXAM:  Constitutional: NAD, awake and alert, morbid obesity   HEENT: PERR, EOMI,  No oral cyananosis.  Neck:  supple,  No JVD  Respiratory: +crackles b/l   Cardiovascular: S1 and S2, IR IR no Murmurs, gallops or rubs  Gastrointestinal: Bowel Sounds present, soft, nontender.   Extremities: No peripheral edema. No clubbing or cyanosis.  Vascular: 2+ peripheral pulses  LEft BKA with prosthesis   Neurological: A/O x 3, no focal deficits  Musculoskeletal: no calf tenderness.  Skin: No rashes.      HOME MEDICATIONS:  Home Medications:  albuterol 90 mcg/inh inhalation aerosol: 2 puff(s) inhaled every 4 hours, As Needed for wheezing (2024 10:38)  Aspirin Enteric Coated 81 mg oral delayed release tablet: 1 tab(s) orally once a day (2024 10:38)  atorvastatin 80 mg oral tablet: 1 tab(s) orally once a day (at bedtime) (2024 11:51)  budesonide-formoterol 160 mcg-4.5 mcg/inh inhalation aerosol: 2 puff(s) inhaled 2 times a day (2024 10:38)  busPIRone 10 mg oral tablet: 1 tab(s) orally 2 times a day (2024 10:38)  Cardizem  mg/24 hours oral tablet, extended release: 1 tab(s) orally once a day (2024 10:38)  cholecalciferol 25 mcg (1000 intl units) oral tablet: 1 tab(s) orally once a day (2024 10:38)  Colace 100 mg oral capsule: 2 cap(s) orally once a day (2024 10:38)  cyanocobalamin 1000 mcg oral tablet: 1 tab(s) orally once a day (2024 10:38)  doxazosin 1 mg oral tablet: 1 tab(s) orally once a day (at bedtime) (2024 10:38)  Farxiga 10 mg oral tablet: 1 tab(s) orally once a day (2024 10:38)  folic acid 1 mg oral tablet: 1 tab(s) orally once a day (2024 10:38)  furosemide 20 mg oral tablet: 2 tab(s) orally 2 times a day (2024 13:44)  gabapentin 400 mg oral capsule: 1 cap(s) orally 3 times a day (2024 10:38)  meclizine 25 mg oral tablet: 1 tab(s) orally every 8 hours as needed for Dizziness (2024 11:51)  Metoprolol Tartrate 25 mg oral tablet: 1 tab(s) orally 2 times a day (2024 10:38)  Multiple Vitamins oral tablet: 1 tab(s) orally once a day (2024 11:51)  omeprazole 40 mg oral delayed release capsule: 1 cap(s) orally once a day (2024 10:38)  Plavix 75 mg oral tablet: 1 tab(s) orally once a day (2024 13:45)  psyllium 500 mg oral capsule: 2 cap(s) orally once a day (2024 10:38)  QUEtiapine 50 mg oral tablet: 2 tab(s) orally once a day (at bedtime) (2024 10:38)  spironolactone 25 mg oral tablet: 2 tab(s) orally once a day (2024 10:38)  thiamine 100 mg oral tablet: 1 tab(s) orally once a day (2024 10:38)      MEDICATIONS  MEDICATIONS  (STANDING):  albuterol/ipratropium for Nebulization 3 milliLiter(s) Nebulizer every 6 hours  aspirin enteric coated 81 milliGRAM(s) Oral daily  atorvastatin 80 milliGRAM(s) Oral at bedtime  budesonide 160 MICROgram(s)/formoterol 4.5 MICROgram(s) Inhaler 2 Puff(s) Inhalation two times a day  busPIRone 10 milliGRAM(s) Oral two times a day  clopidogrel Tablet 75 milliGRAM(s) Oral daily  diltiazem    Tablet 60 milliGRAM(s) Oral every 8 hours  doxazosin 1 milliGRAM(s) Oral at bedtime  doxycycline IVPB 100 milliGRAM(s) IV Intermittent every 12 hours  furosemide    Tablet 40 milliGRAM(s) Oral two times a day  furosemide   Injectable 40 milliGRAM(s) IV Push once  gabapentin 400 milliGRAM(s) Oral three times a day  guaiFENesin ER 1200 milliGRAM(s) Oral every 12 hours  heparin   Injectable 5000 Unit(s) SubCutaneous every 12 hours  influenza  Vaccine (HIGH DOSE) 0.7 milliLiter(s) IntraMuscular once  methylPREDNISolone sodium succinate Injectable 40 milliGRAM(s) IV Push daily  metoprolol tartrate 25 milliGRAM(s) Oral two times a day  multivitamin 1 Tablet(s) Oral daily  pantoprazole    Tablet 40 milliGRAM(s) Oral before breakfast  piperacillin/tazobactam IVPB.. 3.375 Gram(s) IV Intermittent every 8 hours  polyethylene glycol 3350 17 Gram(s) Oral daily  QUEtiapine 100 milliGRAM(s) Oral at bedtime  senna 2 Tablet(s) Oral at bedtime  spironolactone 50 milliGRAM(s) Oral daily        LABS: All Labs Reviewed:                        9.0    11.65 )-----------( 172      ( 2024 06:58 )             29.6   02-29    137  |  101  |  28<H>  ----------------------------<  120<H>  3.5   |  31  |  0.90    Ca    8.7      2024 06:58                       Urinalysis Basic - ( 2024 06:42 )    Color: x / Appearance: x / SG: x / pH: x  Gluc: 151 mg/dL / Ketone: x  / Bili: x / Urobili: x   Blood: x / Protein: x / Nitrite: x   Leuk Esterase: x / RBC: x / WBC x   Sq Epi: x / Non Sq Epi: x / Bacteria: x        Culture - Blood (collected 2024 09:20)  Source: .Blood Blood-Peripheral  Preliminary Report (2024 15:10):    No growth at 48 Hours    Culture - Blood (collected 2024 09:13)  Source: .Blood Blood-Peripheral  Preliminary Report (2024 15:10):    No growth at 48 Hours      Blood Culture:  @ 09:20  Organism --  Gram Stain Blood -- Gram Stain --  Specimen Source .Blood Blood-Peripheral  Culture-Blood --     @ 09:13  Organism --  Gram Stain Blood -- Gram Stain --  Specimen Source .Blood Blood-Peripheral  Culture-Blood --      I&O's Detail    2024 07:01  -  2024 07:00  --------------------------------------------------------  IN:  Total IN: 0 mL    OUT:    Voided (mL): 1350 mL  Total OUT: 1350 mL    Total NET: -1350 mL            CAPILLARY BLOOD GLUCOSE        CARDIOLOGY TESTING   RADIOLOGY: reviewed 
HOSPITALIST ATTENDING PROGRESS NOTE     Chart and meds reviewed. Patient seen and examined     CC: SOB    Subjective: Pt seen and evaluated. Breathing is improved. No other acute complaints.       All other systems and founds to be negative with exception of what has been described above.         PHYSICAL EXAM:  Vital Signs Last 24 Hrs  T(C): 36.9 (2024 20:28), Max: 36.9 (2024 20:28)  T(F): 98.5 (2024 20:28), Max: 98.5 (2024 20:28)  HR: 83 (2024 20:28) (73 - 90)  BP: 103/56 (2024 20:28) (103/56 - 111/58)  RR: 18 (2024 20:28) (18 - 18)  SpO2: 98% (2024 20:28) (97% - 99%)    Parameters below as of 2024 20:28  Patient On (Oxygen Delivery Method): nasal cannula  O2 Flow (L/min): 4    Daily     Daily Weight in k (2024 06:10)    PHYSICAL EXAM:  Constitutional: NAD, awake and alert, morbid obesity   HEENT: PERR, EOMI,  No oral cyananosis.  Neck:  supple,  No JVD  Respiratory: +crackles b/l   Cardiovascular: S1 and S2, IR IR no Murmurs, gallops or rubs  Gastrointestinal: Bowel Sounds present, soft, nontender.   Extremities: No peripheral edema. No clubbing or cyanosis.  Vascular: 2+ peripheral pulses  LEft BKA with prosthesis   Neurological: A/O x 3, no focal deficits  Musculoskeletal: no calf tenderness.  Skin: No rashes.      HOME MEDICATIONS:  Home Medications:  albuterol 90 mcg/inh inhalation aerosol: 2 puff(s) inhaled every 4 hours, As Needed for wheezing (2024 10:38)  Aspirin Enteric Coated 81 mg oral delayed release tablet: 1 tab(s) orally once a day (2024 10:38)  atorvastatin 80 mg oral tablet: 1 tab(s) orally once a day (at bedtime) (2024 11:51)  budesonide-formoterol 160 mcg-4.5 mcg/inh inhalation aerosol: 2 puff(s) inhaled 2 times a day (2024 10:38)  busPIRone 10 mg oral tablet: 1 tab(s) orally 2 times a day (2024 10:38)  Cardizem  mg/24 hours oral tablet, extended release: 1 tab(s) orally once a day (2024 10:38)  cholecalciferol 25 mcg (1000 intl units) oral tablet: 1 tab(s) orally once a day (2024 10:38)  Colace 100 mg oral capsule: 2 cap(s) orally once a day (2024 10:38)  cyanocobalamin 1000 mcg oral tablet: 1 tab(s) orally once a day (2024 10:38)  doxazosin 1 mg oral tablet: 1 tab(s) orally once a day (at bedtime) (2024 10:38)  Farxiga 10 mg oral tablet: 1 tab(s) orally once a day (2024 10:38)  folic acid 1 mg oral tablet: 1 tab(s) orally once a day (2024 10:38)  furosemide 20 mg oral tablet: 2 tab(s) orally 2 times a day (2024 13:44)  gabapentin 400 mg oral capsule: 1 cap(s) orally 3 times a day (2024 10:38)  meclizine 25 mg oral tablet: 1 tab(s) orally every 8 hours as needed for Dizziness (2024 11:51)  Metoprolol Tartrate 25 mg oral tablet: 1 tab(s) orally 2 times a day (2024 10:38)  Multiple Vitamins oral tablet: 1 tab(s) orally once a day (2024 11:51)  omeprazole 40 mg oral delayed release capsule: 1 cap(s) orally once a day (2024 10:38)  Plavix 75 mg oral tablet: 1 tab(s) orally once a day (2024 13:45)  psyllium 500 mg oral capsule: 2 cap(s) orally once a day (2024 10:38)  QUEtiapine 50 mg oral tablet: 2 tab(s) orally once a day (at bedtime) (2024 10:38)  spironolactone 25 mg oral tablet: 2 tab(s) orally once a day (2024 10:38)  thiamine 100 mg oral tablet: 1 tab(s) orally once a day (2024 10:38)      MEDICATIONS  MEDICATIONS  (STANDING):  albuterol/ipratropium for Nebulization 3 milliLiter(s) Nebulizer every 6 hours  aspirin enteric coated 81 milliGRAM(s) Oral daily  atorvastatin 80 milliGRAM(s) Oral at bedtime  budesonide 160 MICROgram(s)/formoterol 4.5 MICROgram(s) Inhaler 2 Puff(s) Inhalation two times a day  busPIRone 10 milliGRAM(s) Oral two times a day  clopidogrel Tablet 75 milliGRAM(s) Oral daily  diltiazem    Tablet 60 milliGRAM(s) Oral every 8 hours  doxazosin 1 milliGRAM(s) Oral at bedtime  doxycycline IVPB 100 milliGRAM(s) IV Intermittent every 12 hours  furosemide    Tablet 40 milliGRAM(s) Oral two times a day  gabapentin 400 milliGRAM(s) Oral three times a day  guaiFENesin ER 1200 milliGRAM(s) Oral every 12 hours  heparin   Injectable 5000 Unit(s) SubCutaneous every 12 hours  influenza  Vaccine (HIGH DOSE) 0.7 milliLiter(s) IntraMuscular once  methylPREDNISolone sodium succinate Injectable 40 milliGRAM(s) IV Push daily  metoprolol tartrate 25 milliGRAM(s) Oral two times a day  multivitamin 1 Tablet(s) Oral daily  pantoprazole    Tablet 40 milliGRAM(s) Oral before breakfast  piperacillin/tazobactam IVPB.. 3.375 Gram(s) IV Intermittent every 8 hours  polyethylene glycol 3350 17 Gram(s) Oral daily  QUEtiapine 100 milliGRAM(s) Oral at bedtime  senna 2 Tablet(s) Oral at bedtime  spironolactone 50 milliGRAM(s) Oral daily      LABS: All Labs Reviewed:                        8.5    14.77 )-----------( 177      ( 2024 06:42 )             28.4     02-    134<L>  |  101  |  25<H>  ----------------------------<  151<H>  3.9   |  31  |  0.84    Ca    9.1      2024 06:42          Urinalysis Basic - ( 2024 06:42 )    Color: x / Appearance: x / SG: x / pH: x  Gluc: 151 mg/dL / Ketone: x  / Bili: x / Urobili: x   Blood: x / Protein: x / Nitrite: x   Leuk Esterase: x / RBC: x / WBC x   Sq Epi: x / Non Sq Epi: x / Bacteria: x        Culture - Blood (collected 2024 09:20)  Source: .Blood Blood-Peripheral  Preliminary Report (2024 15:10):    No growth at 48 Hours    Culture - Blood (collected 2024 09:13)  Source: .Blood Blood-Peripheral  Preliminary Report (2024 15:10):    No growth at 48 Hours      Blood Culture:  @ 09:20  Organism --  Gram Stain Blood -- Gram Stain --  Specimen Source .Blood Blood-Peripheral  Culture-Blood --     @ 09:13  Organism --  Gram Stain Blood -- Gram Stain --  Specimen Source .Blood Blood-Peripheral  Culture-Blood --      I&O's Detail    2024 07:01  -  2024 07:00  --------------------------------------------------------  IN:    Oral Fluid: 480 mL  Total IN: 480 mL    OUT:    Voided (mL): 2300 mL  Total OUT: 2300 mL    Total NET: -1820 mL      2024 07:01  -  2024 21:41  --------------------------------------------------------  IN:  Total IN: 0 mL    OUT:    Voided (mL): 1350 mL  Total OUT: 1350 mL    Total NET: -1350 mL        CAPILLARY BLOOD GLUCOSE        CARDIOLOGY TESTING   RADIOLOGY: reviewed 
HOSPITALIST ATTENDING PROGRESS NOTE    Chart and meds reviewed.      Subjective: Patient seen and examined. Patient sitting comfortably in chair.  WBC 15.12.  Patient on IV steroids.  Denies any chest pain or worsening shortness of breath.  On oral lasox 40mg BID. Cardiology will give extra 1 dose of IV Lasix today. Continue IV antibiotics.  Wean IV steroids.      Additional results/Imaging, I have personally reviewed:    LABS:                            8.9    15.12 )-----------( 152      ( 27 Feb 2024 07:42 )             28.6     02-27    135  |  100  |  22  ----------------------------<  169<H>  3.8   |  28  |  1.02    Ca    8.9      27 Feb 2024 07:42  Mg     1.6     02-26    TPro  7.7  /  Alb  3.4  /  TBili  0.7  /  DBili  x   /  AST  11<L>  /  ALT  11<L>  /  AlkPhos  109  02-26        LIVER FUNCTIONS - ( 26 Feb 2024 09:13 )  Alb: 3.4 g/dL / Pro: 7.7 gm/dL / ALK PHOS: 109 U/L / ALT: 11 U/L / AST: 11 U/L / GGT: x           PT/INR - ( 26 Feb 2024 10:17 )   PT: 12.3 sec;   INR: 1.09 ratio         PTT - ( 26 Feb 2024 10:17 )  PTT:29.7 sec  Urinalysis Basic - ( 27 Feb 2024 07:42 )    Color: x / Appearance: x / SG: x / pH: x  Gluc: 169 mg/dL / Ketone: x  / Bili: x / Urobili: x   Blood: x / Protein: x / Nitrite: x   Leuk Esterase: x / RBC: x / WBC x   Sq Epi: x / Non Sq Epi: x / Bacteria: x              All other systems reviewed and found to be negative with the exception of what has been described above.    MEDICATIONS  (STANDING):  albuterol/ipratropium for Nebulization 3 milliLiter(s) Nebulizer every 6 hours  aspirin enteric coated 81 milliGRAM(s) Oral daily  atorvastatin 80 milliGRAM(s) Oral at bedtime  budesonide 160 MICROgram(s)/formoterol 4.5 MICROgram(s) Inhaler 2 Puff(s) Inhalation two times a day  busPIRone 10 milliGRAM(s) Oral two times a day  clopidogrel Tablet 75 milliGRAM(s) Oral daily  diltiazem    Tablet 60 milliGRAM(s) Oral every 8 hours  doxazosin 1 milliGRAM(s) Oral at bedtime  doxycycline IVPB 100 milliGRAM(s) IV Intermittent every 12 hours  furosemide    Tablet 40 milliGRAM(s) Oral two times a day  gabapentin 400 milliGRAM(s) Oral three times a day  guaiFENesin ER 1200 milliGRAM(s) Oral every 12 hours  heparin   Injectable 5000 Unit(s) SubCutaneous every 12 hours  influenza  Vaccine (HIGH DOSE) 0.7 milliLiter(s) IntraMuscular once  methylPREDNISolone sodium succinate Injectable 40 milliGRAM(s) IV Push daily  metoprolol tartrate 25 milliGRAM(s) Oral two times a day  multivitamin 1 Tablet(s) Oral daily  pantoprazole    Tablet 40 milliGRAM(s) Oral before breakfast  piperacillin/tazobactam IVPB.. 3.375 Gram(s) IV Intermittent every 8 hours  polyethylene glycol 3350 17 Gram(s) Oral daily  QUEtiapine 100 milliGRAM(s) Oral at bedtime  senna 2 Tablet(s) Oral at bedtime  spironolactone 50 milliGRAM(s) Oral daily    MEDICATIONS  (PRN):  acetaminophen     Tablet .. 650 milliGRAM(s) Oral every 6 hours PRN Temp greater or equal to 38C (100.4F), Mild Pain (1 - 3)  aluminum hydroxide/magnesium hydroxide/simethicone Suspension 30 milliLiter(s) Oral every 4 hours PRN Dyspepsia  melatonin 3 milliGRAM(s) Oral at bedtime PRN Insomnia  ondansetron Injectable 4 milliGRAM(s) IV Push every 8 hours PRN Nausea and/or Vomiting      VITALS:  T(F): 98.3 (02-27-24 @ 07:27), Max: 100.1 (02-26-24 @ 17:43)  HR: 88 (02-27-24 @ 14:13) (73 - 126)  BP: 104/46 (02-27-24 @ 14:13) (104/46 - 150/63)  RR: 20 (02-27-24 @ 07:27) (20 - 28)  SpO2: 95% (02-27-24 @ 07:27) (95% - 100%)  Wt(kg): --    I&O's Summary    27 Feb 2024 07:01  -  27 Feb 2024 15:49  --------------------------------------------------------  IN: 480 mL / OUT: 1700 mL / NET: -1220 mL        CAPILLARY BLOOD GLUCOSE          PHYSICAL EXAM:  Constitutional: NAD, awake and alert, morbid obesity   HEENT: PERR, EOMI,  No oral cyananosis.  Neck:  supple,  No JVD  Respiratory: Breath sounds are right basal crackles   Cardiovascular: S1 and S2, IR IR no Murmurs, gallops or rubs  Gastrointestinal: Bowel Sounds present, soft, nontender.   Extremities: No peripheral edema. No clubbing or cyanosis.  Vascular: 2+ peripheral pulses  LEft BKA with prosthesis   Neurological: A/O x 3, no focal deficits  Musculoskeletal: no calf tenderness.  Skin: No rashes.      CULTURES:      Telemetry, personally reviewed
    CHIEF COMPLAINT: worsening of shortness of breath     HPI:  67 y/o male with PMHX of chronic AFIB s/p watchmans, hx of ETOH abuse, cirrhosis, chronic diastolic CHF, COPD, chronic hypoxic respiratory failure s/p trach and PEG now decannulated on 4L home O2, left BKA, CVA Nov 2023 on DAPT who presented to  with CC of SOB.  Patient notes SOB started last evening.  He felt increased WOB overnight and started with coughing.  Patient normally uses 4 L NC O2 continuous.  He had this on and was still feeling SOB.  Patient notes he was coughing up significant phlegm.  Patient presented to the ER for evaluation.  In the ER, patient found to be febrile-- temp 102.7.  Tachy 140's.  Tachynea with RR 26.  CXR with RLL PNA.  Admitted for respiratory failure/ PNA/ COPD exacerbation.   and afib with rapid rate     Patient rate symptoms better with IV steroid, bronchodilator treatment , currently his rate has improved  , Patient did recieve IV fluid , his lactate level , troponin levels were normal     2/28/24: Pt denies cp.  Still with SOB but improving.  Tele: Afib, rate controlled      PAST MEDICAL & SURGICAL HISTORY:  Chronic atrial fibrillation  Alcohol abuse  CHF (congestive heart failure)  Cirrhosis  Emphysema of lung  Alcohol withdrawal  Collapsed lung  Meningitis  Falls  Anemia  Chronic obstructive pulmonary disease (COPD)  GERD (gastroesophageal reflux disease)  Hypertension  Presence of Watchman left atrial appendage closure device  Atrial fibrillation  COPD without exacerbation  S/P BKA (below knee amputation) unilateral, left  traumatic   Presence of Watchman left atrial appendage closure device  Unilateral amputation of lower extremity below knee  H/O tracheostomy  now removed  S/P percutaneous endoscopic gastrostomy (PEG) tube placement  now removed        FAMILY HISTORY:  No pertinent family history in first degree relatives      Social History:    Lives in [facility].    No smoking.    Previous ETOH-- now quit.        Allergies:    Ceclor (Rash)  Duricef (Rash)          Allergies    Ceclor (Rash)  Duricef (Rash)    Intolerances      MEDICATIONS:Home Medications:  albuterol 90 mcg/inh inhalation aerosol: 2 puff(s) inhaled every 4 hours, As Needed for wheezing (26 Feb 2024 10:38)  Aspirin Enteric Coated 81 mg oral delayed release tablet: 1 tab(s) orally once a day (26 Feb 2024 10:38)  atorvastatin 80 mg oral tablet: 1 tab(s) orally once a day (at bedtime) (26 Feb 2024 11:51)  budesonide-formoterol 160 mcg-4.5 mcg/inh inhalation aerosol: 2 puff(s) inhaled 2 times a day (26 Feb 2024 10:38)  busPIRone 10 mg oral tablet: 1 tab(s) orally 2 times a day (26 Feb 2024 10:38)  Cardizem  mg/24 hours oral tablet, extended release: 1 tab(s) orally once a day (26 Feb 2024 10:38)  cholecalciferol 25 mcg (1000 intl units) oral tablet: 1 tab(s) orally once a day (26 Feb 2024 10:38)  Colace 100 mg oral capsule: 2 cap(s) orally once a day (26 Feb 2024 10:38)  cyanocobalamin 1000 mcg oral tablet: 1 tab(s) orally once a day (26 Feb 2024 10:38)  doxazosin 1 mg oral tablet: 1 tab(s) orally once a day (at bedtime) (26 Feb 2024 10:38)  Farxiga 10 mg oral tablet: 1 tab(s) orally once a day (26 Feb 2024 10:38)  folic acid 1 mg oral tablet: 1 tab(s) orally once a day (26 Feb 2024 10:38)  furosemide 20 mg oral tablet: 2 tab(s) orally 2 times a day (26 Feb 2024 13:44)  gabapentin 400 mg oral capsule: 1 cap(s) orally 3 times a day (26 Feb 2024 10:38)  meclizine 25 mg oral tablet: 1 tab(s) orally every 8 hours as needed for Dizziness (26 Feb 2024 11:51)  Metoprolol Tartrate 25 mg oral tablet: 1 tab(s) orally 2 times a day (26 Feb 2024 10:38)  Multiple Vitamins oral tablet: 1 tab(s) orally once a day (26 Feb 2024 11:51)  omeprazole 40 mg oral delayed release capsule: 1 cap(s) orally once a day (26 Feb 2024 10:38)  Plavix 75 mg oral tablet: 1 tab(s) orally once a day (26 Feb 2024 13:45)  psyllium 500 mg oral capsule: 2 cap(s) orally once a day (26 Feb 2024 10:38)  QUEtiapine 50 mg oral tablet: 2 tab(s) orally once a day (at bedtime) (26 Feb 2024 10:38)  spironolactone 25 mg oral tablet: 2 tab(s) orally once a day (26 Feb 2024 10:38)  thiamine 100 mg oral tablet: 1 tab(s) orally once a day (26 Feb 2024 10:38)    MEDICATIONS  (STANDING):  albuterol/ipratropium for Nebulization 3 milliLiter(s) Nebulizer every 6 hours  aspirin enteric coated 81 milliGRAM(s) Oral daily  atorvastatin 80 milliGRAM(s) Oral at bedtime  budesonide 160 MICROgram(s)/formoterol 4.5 MICROgram(s) Inhaler 2 Puff(s) Inhalation two times a day  busPIRone 10 milliGRAM(s) Oral two times a day  clopidogrel Tablet 75 milliGRAM(s) Oral daily  diltiazem    Tablet 60 milliGRAM(s) Oral every 8 hours  doxazosin 1 milliGRAM(s) Oral at bedtime  doxycycline IVPB 100 milliGRAM(s) IV Intermittent every 12 hours  furosemide    Tablet 40 milliGRAM(s) Oral two times a day  gabapentin 400 milliGRAM(s) Oral three times a day  guaiFENesin ER 1200 milliGRAM(s) Oral every 12 hours  heparin   Injectable 5000 Unit(s) SubCutaneous every 12 hours  influenza  Vaccine (HIGH DOSE) 0.7 milliLiter(s) IntraMuscular once  methylPREDNISolone sodium succinate Injectable 40 milliGRAM(s) IV Push daily  metoprolol tartrate 25 milliGRAM(s) Oral two times a day  multivitamin 1 Tablet(s) Oral daily  pantoprazole    Tablet 40 milliGRAM(s) Oral before breakfast  piperacillin/tazobactam IVPB.. 3.375 Gram(s) IV Intermittent every 8 hours  polyethylene glycol 3350 17 Gram(s) Oral daily  QUEtiapine 100 milliGRAM(s) Oral at bedtime  senna 2 Tablet(s) Oral at bedtime  spironolactone 50 milliGRAM(s) Oral daily    MEDICATIONS  (PRN):  acetaminophen     Tablet .. 650 milliGRAM(s) Oral every 6 hours PRN Temp greater or equal to 38C (100.4F), Mild Pain (1 - 3)  aluminum hydroxide/magnesium hydroxide/simethicone Suspension 30 milliLiter(s) Oral every 4 hours PRN Dyspepsia  melatonin 3 milliGRAM(s) Oral at bedtime PRN Insomnia  ondansetron Injectable 4 milliGRAM(s) IV Push every 8 hours PRN Nausea and/or Vomiting    Vital Signs Last 24 Hrs  T(C): 36.8 (28 Feb 2024 07:40), Max: 36.8 (28 Feb 2024 07:40)  T(F): 98.2 (28 Feb 2024 07:40), Max: 98.2 (28 Feb 2024 07:40)  HR: 80 (28 Feb 2024 10:18) (75 - 90)  BP: 111/58 (28 Feb 2024 07:40) (109/57 - 117/59)  BP(mean): --  RR: 19 (27 Feb 2024 21:32) (19 - 20)  SpO2: 99% (28 Feb 2024 10:18) (97% - 99%)    Parameters below as of 28 Feb 2024 10:18  Patient On (Oxygen Delivery Method): nasal cannula      I&O's Summary      PHYSICAL EXAM:    Constitutional: NAD, awake and alert, morbid obesity   HEENT: PERR, EOMI,  No oral cyananosis.  Neck:  supple,  No JVD  Respiratory: Breath sounds with exp wheeze and bibasilar rales   Cardiovascular: S1 and S2, IR IR no Murmurs, gallops or rubs  Gastrointestinal: Bowel Sounds present, soft, nontender.   Extremities:1+ RLE edema. No clubbing or cyanosis.  Vascular: 2+ peripheral pulses  LEft BKA with prosthesis   Neurological: A/O x 3, no focal deficits  Musculoskeletal: no calf tenderness.  Skin: No rashes.      LABS: All Labs Reviewed:                          8.5    14.77 )-----------( 177      ( 28 Feb 2024 06:42 )             28.4                           8.9    15.12 )-----------( 152      ( 27 Feb 2024 07:42 )             28.6                         11.0   10.95 )-----------( 189      ( 26 Feb 2024 09:13 )             36.0     02-28    134<L>  |  101  |  25<H>  ----------------------------<  151<H>  3.9   |  31  |  0.84    Ca    9.1      28 Feb 2024 06:42        27 Feb 2024 07:42    135    |  100    |  22     ----------------------------<  169    3.8     |  28     |  1.02   26 Feb 2024 09:13    134    |  98     |  13     ----------------------------<  130    3.8     |  35     |  0.91     Ca    8.9        27 Feb 2024 07:42  Ca    9.4        26 Feb 2024 09:13  Mg     1.6       26 Feb 2024 09:13    TPro  7.7    /  Alb  3.4    /  TBili  0.7    /  DBili  x      /  AST  11     /  ALT  11     /  AlkPhos  109    26 Feb 2024 09:13    PT/INR - ( 26 Feb 2024 10:17 )   PT: 12.3 sec;   INR: 1.09 ratio         PTT - ( 26 Feb 2024 10:17 )  PTT:29.7 sec    - TroponinI hsT: <-7.01, <-5.83    Blood Culture:         RADIOLOGY/EKG:  < from: 12 Lead ECG (02.26.24 @ 09:28) >    Diagnosis Line *** Critical Test Result: High HR  Atrial fibrillation with rapid ventricular response  Possible Anterior infarct (cited on or before 17-AUG-2017)  Abnormal ECG  Confirmed by Dominick Middleton ([id number]) on 2/26/2024 10:43:34 PM    < end of copied text >  < from: MYA Complete w/Spect and Color (11.20.23 @ 15:07) >     Normal left ventricular size and systolic function. Estimated LVEF is   60-65%.   Left atrial occlusion device present with small jose-device leak. No   evidence of jose-device thrombus.   Moderate mitral and tricuspid regurgitation.    < end of copied text >  < from: MYA Complete w/Spect and Color (11.20.23 @ 15:07) >  Mitral Valve   Moderate mitral regurgitation.     Aortic Valve   Normal aortic valve structure and function.     Tricuspid Valve   Moderate tricuspid valve regurgitation.     Left Atrium   There is a left atrial occlusion device present. Small jose-device leak.   No evidence of jose-device thrombus.   A saline contrast study was performed and showed no evidence of a patent   foramen ovale.     Left Ventricle   The left ventricle is normal in size, wall thickness, wall motion and   contractility.   Estimated LVEF 60-65%.    < end of copied text >  < from: Xray Chest 1 View- PORTABLE-Urgent (02.26.24 @ 10:11) >    INTERPRETATION:  AP erect chest on February 26, 2024 at 9:55 AM. Patient   has chest pain.    Heart is quite enlarged.    There is persistent CHF but it is somewhat improved from November 30, 2023.    IMPRESSION: Somewhat improved CHF from prior. There is a significant   residual.    --- End of Report ---        < end of copied text >    
Subjective:  awake and alert  no distress  feels better    MEDICATIONS  (STANDING):  albuterol/ipratropium for Nebulization 3 milliLiter(s) Nebulizer every 6 hours  aspirin enteric coated 81 milliGRAM(s) Oral daily  atorvastatin 80 milliGRAM(s) Oral at bedtime  budesonide 160 MICROgram(s)/formoterol 4.5 MICROgram(s) Inhaler 2 Puff(s) Inhalation two times a day  busPIRone 10 milliGRAM(s) Oral two times a day  clopidogrel Tablet 75 milliGRAM(s) Oral daily  diltiazem    Tablet 60 milliGRAM(s) Oral every 8 hours  doxazosin 1 milliGRAM(s) Oral at bedtime  doxycycline IVPB 100 milliGRAM(s) IV Intermittent every 12 hours  furosemide    Tablet 40 milliGRAM(s) Oral two times a day  gabapentin 400 milliGRAM(s) Oral three times a day  guaiFENesin ER 1200 milliGRAM(s) Oral every 12 hours  heparin   Injectable 5000 Unit(s) SubCutaneous every 12 hours  influenza  Vaccine (HIGH DOSE) 0.7 milliLiter(s) IntraMuscular once  methylPREDNISolone sodium succinate Injectable 40 milliGRAM(s) IV Push daily  metoprolol tartrate 25 milliGRAM(s) Oral two times a day  multivitamin 1 Tablet(s) Oral daily  pantoprazole    Tablet 40 milliGRAM(s) Oral before breakfast  piperacillin/tazobactam IVPB.. 3.375 Gram(s) IV Intermittent every 8 hours  polyethylene glycol 3350 17 Gram(s) Oral daily  QUEtiapine 100 milliGRAM(s) Oral at bedtime  senna 2 Tablet(s) Oral at bedtime  spironolactone 50 milliGRAM(s) Oral daily    MEDICATIONS  (PRN):  acetaminophen     Tablet .. 650 milliGRAM(s) Oral every 6 hours PRN Temp greater or equal to 38C (100.4F), Mild Pain (1 - 3)  aluminum hydroxide/magnesium hydroxide/simethicone Suspension 30 milliLiter(s) Oral every 4 hours PRN Dyspepsia  melatonin 3 milliGRAM(s) Oral at bedtime PRN Insomnia  ondansetron Injectable 4 milliGRAM(s) IV Push every 8 hours PRN Nausea and/or Vomiting      Allergies    Ceclor (Rash)  Duricef (Rash)    Intolerances        REVIEW OF SYSTEMS:    CONSTITUTIONAL:  As per HPI.  HEENT:  Eyes:  No diplopia or blurred vision. ENT:  No earache, sore throat or runny nose.  CARDIOVASCULAR:  No pressure, squeezing, tightness, heaviness or aching about the chest, neck, axilla or epigastrium.  RESPIRATORY:  No cough, shortness of breath, PND or orthopnea.  GASTROINTESTINAL:  No nausea, vomiting or diarrhea.  GENITOURINARY:  No dysuria, frequency or urgency.  MUSCULOSKELETAL:  no joint pain, deformity, tenderness  EXTREMITIES: left bka  SKIN:  No change in skin, hair or nails.  NEUROLOGIC:  No paresthesias, fasciculations, seizures or weakness.  PSYCHIATRIC:  No disorder of thought or mood.  ENDOCRINE:  No heat or cold intolerance, polyuria or polydipsia.  HEMATOLOGICAL:  No easy bruising or bleedings:    Vital Signs Last 24 Hrs  T(C): 36.6 (27 Feb 2024 21:32), Max: 36.6 (27 Feb 2024 14:13)  T(F): 97.9 (27 Feb 2024 21:32), Max: 97.9 (27 Feb 2024 14:13)  HR: 75 (28 Feb 2024 02:50) (75 - 88)  BP: 117/59 (27 Feb 2024 21:32) (109/57 - 117/59)  BP(mean): --  RR: 19 (27 Feb 2024 21:32) (19 - 20)  SpO2: 99% (28 Feb 2024 02:50) (99% - 99%)    Parameters below as of 28 Feb 2024 02:50  Patient On (Oxygen Delivery Method): nasal cannula, 4 Lpm        PHYSICAL EXAMINATION:  SKIN: no rashes  HEAD: NC/AT  EYES: PERRLA, EOMI  NECK:  Supple. No lymphadenopathy. Jugular venous pressure not elevated. Carotids equal.   HEART:   S1S2 irreg  CHEST:  bibasilar crackles  ABDOMEN:  Soft and nontender.   EXTREMITIES:  left bka  NEURO: AAO x 3, no focal deficts       LABS:                        8.5    14.77 )-----------( 177      ( 28 Feb 2024 06:42 )             28.4     02-28    134<L>  |  101  |  25<H>  ----------------------------<  151<H>  3.9   |  31  |  0.84    Ca    9.1      28 Feb 2024 06:42  Mg     1.6     02-26    TPro  7.7  /  Alb  3.4  /  TBili  0.7  /  DBili  x   /  AST  11<L>  /  ALT  11<L>  /  AlkPhos  109  02-26    PT/INR - ( 26 Feb 2024 10:17 )   PT: 12.3 sec;   INR: 1.09 ratio         PTT - ( 26 Feb 2024 10:17 )  PTT:29.7 sec  Urinalysis Basic - ( 28 Feb 2024 06:42 )    Color: x / Appearance: x / SG: x / pH: x  Gluc: 151 mg/dL / Ketone: x  / Bili: x / Urobili: x   Blood: x / Protein: x / Nitrite: x   Leuk Esterase: x / RBC: x / WBC x   Sq Epi: x / Non Sq Epi: x / Bacteria: x        RADIOLOGY & ADDITIONAL TESTS:    
Subjective:  awake and alert  no distress  on O2    MEDICATIONS  (STANDING):  albuterol/ipratropium for Nebulization 3 milliLiter(s) Nebulizer every 6 hours  aspirin enteric coated 81 milliGRAM(s) Oral daily  atorvastatin 80 milliGRAM(s) Oral at bedtime  budesonide 160 MICROgram(s)/formoterol 4.5 MICROgram(s) Inhaler 2 Puff(s) Inhalation two times a day  busPIRone 10 milliGRAM(s) Oral two times a day  clopidogrel Tablet 75 milliGRAM(s) Oral daily  diltiazem    Tablet 60 milliGRAM(s) Oral every 8 hours  doxazosin 1 milliGRAM(s) Oral at bedtime  doxycycline IVPB 100 milliGRAM(s) IV Intermittent every 12 hours  furosemide    Tablet 40 milliGRAM(s) Oral two times a day  gabapentin 400 milliGRAM(s) Oral three times a day  guaiFENesin ER 1200 milliGRAM(s) Oral every 12 hours  heparin   Injectable 5000 Unit(s) SubCutaneous every 12 hours  influenza  Vaccine (HIGH DOSE) 0.7 milliLiter(s) IntraMuscular once  methylPREDNISolone sodium succinate Injectable 40 milliGRAM(s) IV Push daily  metoprolol tartrate 25 milliGRAM(s) Oral two times a day  multivitamin 1 Tablet(s) Oral daily  pantoprazole    Tablet 40 milliGRAM(s) Oral before breakfast  piperacillin/tazobactam IVPB.. 3.375 Gram(s) IV Intermittent every 8 hours  polyethylene glycol 3350 17 Gram(s) Oral daily  QUEtiapine 100 milliGRAM(s) Oral at bedtime  senna 2 Tablet(s) Oral at bedtime  spironolactone 50 milliGRAM(s) Oral daily    MEDICATIONS  (PRN):  acetaminophen     Tablet .. 650 milliGRAM(s) Oral every 6 hours PRN Temp greater or equal to 38C (100.4F), Mild Pain (1 - 3)  aluminum hydroxide/magnesium hydroxide/simethicone Suspension 30 milliLiter(s) Oral every 4 hours PRN Dyspepsia  melatonin 3 milliGRAM(s) Oral at bedtime PRN Insomnia  ondansetron Injectable 4 milliGRAM(s) IV Push every 8 hours PRN Nausea and/or Vomiting      Allergies    Ceclor (Rash)  Duricef (Rash)    Intolerances        REVIEW OF SYSTEMS:    CONSTITUTIONAL:  As per HPI.  HEENT:  Eyes:  No diplopia or blurred vision. ENT:  No earache, sore throat or runny nose.  CARDIOVASCULAR:  No pressure, squeezing, tightness, heaviness or aching about the chest, neck, axilla or epigastrium.  RESPIRATORY:  No cough, shortness of breath, PND or orthopnea.  GASTROINTESTINAL:  No nausea, vomiting or diarrhea.  GENITOURINARY:  No dysuria, frequency or urgency.  MUSCULOSKELETAL:  no joint pain, deformity, tenderness  EXTREMITIES:left bka  SKIN:  No change in skin, hair or nails.  NEUROLOGIC:  No paresthesias, fasciculations, seizures or weakness.  PSYCHIATRIC:  No disorder of thought or mood.  ENDOCRINE:  No heat or cold intolerance, polyuria or polydipsia.  HEMATOLOGICAL:  No easy bruising or bleedings:    Vital Signs Last 24 Hrs  T(C): 37.1 (01 Mar 2024 08:34), Max: 37.1 (01 Mar 2024 08:34)  T(F): 98.7 (01 Mar 2024 08:34), Max: 98.7 (01 Mar 2024 08:34)  HR: 93 (01 Mar 2024 08:34) (82 - 96)  BP: 123/58 (01 Mar 2024 08:34) (103/56 - 123/58)  BP(mean): --  RR: 18 (01 Mar 2024 08:34) (18 - 18)  SpO2: 96% (01 Mar 2024 08:34) (95% - 96%)    Parameters below as of 01 Mar 2024 08:34  Patient On (Oxygen Delivery Method): nasal cannula        PHYSICAL EXAMINATION:  SKIN: no rashes  HEAD: NC/AT  EYES: PERRLA, EOMI  NECK:  Supple. No lymphadenopathy. Jugular venous pressure not elevated. Carotids equal.   HEART:  S1S2 reg  CHEST:  decreased bs bases  ABDOMEN:  Soft and nontender.   EXTREMITIES:  left bka  NEURO: AAO x 3, no focal deficts       LABS:                        9.0    11.65 )-----------( 172      ( 29 Feb 2024 06:58 )             29.6     02-29    137  |  101  |  28<H>  ----------------------------<  120<H>  3.5   |  31  |  0.90    Ca    8.7      29 Feb 2024 06:58        Urinalysis Basic - ( 29 Feb 2024 06:58 )    Color: x / Appearance: x / SG: x / pH: x  Gluc: 120 mg/dL / Ketone: x  / Bili: x / Urobili: x   Blood: x / Protein: x / Nitrite: x   Leuk Esterase: x / RBC: x / WBC x   Sq Epi: x / Non Sq Epi: x / Bacteria: x        RADIOLOGY & ADDITIONAL TESTS:    
  CHIEF COMPLAINT: worsening of shortness of breath     HPI:  69 y/o male with PMHX of chronic AFIB s/p watchmans, hx of ETOH abuse, cirrhosis, chronic diastolic CHF, COPD, chronic hypoxic respiratory failure s/p trach and PEG now decannulated on 4L home O2, left BKA, CVA Nov 2023 on DAPT who presented to  with CC of SOB.  Patient notes SOB started last evening.  He felt increased WOB overnight and started with coughing.  Patient normally uses 4 L NC O2 continuous.  He had this on and was still feeling SOB.  Patient notes he was coughing up significant phlegm.  Patient presented to the ER for evaluation.  In the ER, patient found to be febrile-- temp 102.7.  Tachy 140's.  Tachynea with RR 26.  CXR with RLL PNA.  Admitted for respiratory failure/ PNA/ COPD exacerbation.   and afib with rapid rate     Patient rate symptoms better with IV steroid, bronchodilator treatment , currently his rate has improved  , Patient did recieve IV fluid , his lactate level , troponin levels were normal     2/28/24: Pt denies cp.  Still with SOB but improving.  Tele: Afib, rate controlled  2/29/24:  Pt denies cp.  SOB with exertion.  Walked with PT today.  Tele: Afib 70's-100  3/1/24: seated in bedside chair receiving resp treatment  appears comfortable no CP tele AF V pace       PAST MEDICAL & SURGICAL HISTORY:  Chronic atrial fibrillation  Alcohol abuse  CHF (congestive heart failure)  Cirrhosis  Emphysema of lung  Alcohol withdrawal  Collapsed lung  Meningitis  Falls  Anemia  Chronic obstructive pulmonary disease (COPD)  GERD (gastroesophageal reflux disease)  Hypertension  Presence of Watchman left atrial appendage closure device  Atrial fibrillation  COPD without exacerbation  S/P BKA (below knee amputation) unilateral, left  traumatic   Presence of Watchman left atrial appendage closure device  Unilateral amputation of lower extremity below knee  H/O tracheostomy  now removed  S/P percutaneous endoscopic gastrostomy (PEG) tube placement  now removed        FAMILY HISTORY:  No pertinent family history in first degree relatives      Social History:    Lives in [facility].    No smoking.    Previous ETOH-- now quit.        Allergies:    Ceclor (Rash)  Duricef (Rash)          Allergies    Ceclor (Rash)  Duricef (Rash)    Intolerances    MEDICATIONS  (STANDING):  albuterol/ipratropium for Nebulization 3 milliLiter(s) Nebulizer every 6 hours  aspirin enteric coated 81 milliGRAM(s) Oral daily  atorvastatin 80 milliGRAM(s) Oral at bedtime  budesonide 160 MICROgram(s)/formoterol 4.5 MICROgram(s) Inhaler 2 Puff(s) Inhalation two times a day  busPIRone 10 milliGRAM(s) Oral two times a day  clopidogrel Tablet 75 milliGRAM(s) Oral daily  diltiazem    Tablet 60 milliGRAM(s) Oral every 8 hours  doxazosin 1 milliGRAM(s) Oral at bedtime  doxycycline IVPB 100 milliGRAM(s) IV Intermittent every 12 hours  furosemide    Tablet 40 milliGRAM(s) Oral two times a day  gabapentin 400 milliGRAM(s) Oral three times a day  guaiFENesin ER 1200 milliGRAM(s) Oral every 12 hours  heparin   Injectable 5000 Unit(s) SubCutaneous every 12 hours  influenza  Vaccine (HIGH DOSE) 0.7 milliLiter(s) IntraMuscular once  methylPREDNISolone sodium succinate Injectable 40 milliGRAM(s) IV Push daily  metoprolol tartrate 25 milliGRAM(s) Oral two times a day  multivitamin 1 Tablet(s) Oral daily  pantoprazole    Tablet 40 milliGRAM(s) Oral before breakfast  piperacillin/tazobactam IVPB.. 3.375 Gram(s) IV Intermittent every 8 hours  polyethylene glycol 3350 17 Gram(s) Oral daily  QUEtiapine 100 milliGRAM(s) Oral at bedtime  senna 2 Tablet(s) Oral at bedtime  spironolactone 50 milliGRAM(s) Oral daily    MEDICATIONS  (PRN):  acetaminophen     Tablet .. 650 milliGRAM(s) Oral every 6 hours PRN Temp greater or equal to 38C (100.4F), Mild Pain (1 - 3)  aluminum hydroxide/magnesium hydroxide/simethicone Suspension 30 milliLiter(s) Oral every 4 hours PRN Dyspepsia  melatonin 3 milliGRAM(s) Oral at bedtime PRN Insomnia  ondansetron Injectable 4 milliGRAM(s) IV Push every 8 hours PRN Nausea and/or Vomiting      Vital Signs Last 24 Hrs  T(C): 37.1 (01 Mar 2024 08:34), Max: 37.1 (01 Mar 2024 08:34)  T(F): 98.7 (01 Mar 2024 08:34), Max: 98.7 (01 Mar 2024 08:34)  HR: 93 (01 Mar 2024 08:34) (82 - 96)  BP: 123/58 (01 Mar 2024 08:34) (103/56 - 123/58)  BP(mean): --  RR: 18 (01 Mar 2024 08:34) (18 - 18)  SpO2: 96% (01 Mar 2024 08:34) (95% - 96%)    Parameters below as of 01 Mar 2024 08:34  Patient On (Oxygen Delivery Method): nasal cannula        I&O's Summary      PHYSICAL EXAM:    Constitutional: NAD, awake and alert, morbid obesity   HEENT: PERR, EOMI,  No oral cyananosis.  Neck:  supple,  No JVD  Respiratory: Breath sounds with bibasilar rales   Cardiovascular: S1 and S2, IR IR no Murmurs, gallops or rubs  Gastrointestinal: Bowel Sounds present, soft, nontender.   Extremities:1+ RLE edema. No clubbing or cyanosis.  Vascular: 2+ peripheral pulses  LEft BKA with prosthesis   Neurological: A/O x 3, no focal deficits  Musculoskeletal: no calf tenderness.  Skin: No rashes.      LABS: All Labs Reviewed:                          9.0    11.65 )-----------( 172      ( 29 Feb 2024 06:58 )             29.6                             8.5    14.77 )-----------( 177      ( 28 Feb 2024 06:42 )             28.4                           8.9    15.12 )-----------( 152      ( 27 Feb 2024 07:42 )             28.6                         11.0   10.95 )-----------( 189      ( 26 Feb 2024 09:13 )             36.0     02-29    137  |  101  |  28<H>  ----------------------------<  120<H>  3.5   |  31  |  0.90    Ca    8.7      29 Feb 2024 06:58        02-28    134<L>  |  101  |  25<H>  ----------------------------<  151<H>  3.9   |  31  |  0.84    Ca    9.1      28 Feb 2024 06:42        27 Feb 2024 07:42    135    |  100    |  22     ----------------------------<  169    3.8     |  28     |  1.02   26 Feb 2024 09:13    134    |  98     |  13     ----------------------------<  130    3.8     |  35     |  0.91     Ca    8.9        27 Feb 2024 07:42  Ca    9.4        26 Feb 2024 09:13  Mg     1.6       26 Feb 2024 09:13    TPro  7.7    /  Alb  3.4    /  TBili  0.7    /  DBili  x      /  AST  11     /  ALT  11     /  AlkPhos  109    26 Feb 2024 09:13    PT/INR - ( 26 Feb 2024 10:17 )   PT: 12.3 sec;   INR: 1.09 ratio         PTT - ( 26 Feb 2024 10:17 )  PTT:29.7 sec    - TroponinI hsT: <-7.01, <-5.83    Blood Culture:         RADIOLOGY/EKG:  < from: 12 Lead ECG (02.26.24 @ 09:28) >    Diagnosis Line *** Critical Test Result: High HR  Atrial fibrillation with rapid ventricular response  Possible Anterior infarct (cited on or before 17-AUG-2017)  Abnormal ECG  Confirmed by Dominick Middleton ([id number]) on 2/26/2024 10:43:34 PM    < end of copied text >  < from: MYA Complete w/Spect and Color (11.20.23 @ 15:07) >     Normal left ventricular size and systolic function. Estimated LVEF is   60-65%.   Left atrial occlusion device present with small jose-device leak. No   evidence of jose-device thrombus.   Moderate mitral and tricuspid regurgitation.    < end of copied text >  < from: MYA Complete w/Spect and Color (11.20.23 @ 15:07) >  Mitral Valve   Moderate mitral regurgitation.     Aortic Valve   Normal aortic valve structure and function.     Tricuspid Valve   Moderate tricuspid valve regurgitation.     Left Atrium   There is a left atrial occlusion device present. Small jose-device leak.   No evidence of jose-device thrombus.   A saline contrast study was performed and showed no evidence of a patent   foramen ovale.     Left Ventricle   The left ventricle is normal in size, wall thickness, wall motion and   contractility.   Estimated LVEF 60-65%.    < end of copied text >  < from: Xray Chest 1 View- PORTABLE-Urgent (02.26.24 @ 10:11) >    INTERPRETATION:  AP erect chest on February 26, 2024 at 9:55 AM. Patient   has chest pain.    Heart is quite enlarged.    There is persistent CHF but it is somewhat improved from November 30, 2023.    IMPRESSION: Somewhat improved CHF from prior. There is a significant   residual.    --- End of Report ---        < end of copied text >    
  CHIEF COMPLAINT: worsening of shortness of breath     HPI:  69 y/o male with PMHX of chronic AFIB s/p watchmans, hx of ETOH abuse, cirrhosis, chronic diastolic CHF, COPD, chronic hypoxic respiratory failure s/p trach and PEG now decannulated on 4L home O2, left BKA, CVA 2023 on DAPT who presented to  with CC of SOB.  Patient notes SOB started last evening.  He felt increased WOB overnight and started with coughing.  Patient normally uses 4 L NC O2 continuous.  He had this on and was still feeling SOB.  Patient notes he was coughing up significant phlegm.  Patient presented to the ER for evaluation.  In the ER, patient found to be febrile-- temp 102.7.  Tachy 140's.  Tachynea with RR 26.  CXR with RLL PNA.  Admitted for respiratory failure/ PNA/ COPD exacerbation.   and afib with rapid rate     Patient rate symptoms better with IV steroid, bronchodilator treatment , currently his rate has improved  , Patient did recieve IV fluid , his lactate level , troponin levels were normal     24: Pt denies cp.  Still with SOB but improving.  Tele: Afib, rate controlled  24:  Pt denies cp.  SOB with exertion.  Walked with PT today.  Tele: Afib 70's-100      PAST MEDICAL & SURGICAL HISTORY:  Chronic atrial fibrillation  Alcohol abuse  CHF (congestive heart failure)  Cirrhosis  Emphysema of lung  Alcohol withdrawal  Collapsed lung  Meningitis  Falls  Anemia  Chronic obstructive pulmonary disease (COPD)  GERD (gastroesophageal reflux disease)  Hypertension  Presence of Watchman left atrial appendage closure device  Atrial fibrillation  COPD without exacerbation  S/P BKA (below knee amputation) unilateral, left  traumatic   Presence of Watchman left atrial appendage closure device  Unilateral amputation of lower extremity below knee  H/O tracheostomy  now removed  S/P percutaneous endoscopic gastrostomy (PEG) tube placement  now removed        FAMILY HISTORY:  No pertinent family history in first degree relatives      Social History:    Lives in [facility] [place].    No smoking.    Previous ETOH-- now quit.        Allergies:    Ceclor (Rash)  Duricef (Rash)          Allergies    Ceclor (Rash)  Duricef (Rash)    Intolerances      MEDICATIONS:Home Medications:  albuterol 90 mcg/inh inhalation aerosol: 2 puff(s) inhaled every 4 hours, As Needed for wheezing (2024 10:38)  Aspirin Enteric Coated 81 mg oral delayed release tablet: 1 tab(s) orally once a day (2024 10:38)  atorvastatin 80 mg oral tablet: 1 tab(s) orally once a day (at bedtime) (2024 11:51)  budesonide-formoterol 160 mcg-4.5 mcg/inh inhalation aerosol: 2 puff(s) inhaled 2 times a day (2024 10:38)  busPIRone 10 mg oral tablet: 1 tab(s) orally 2 times a day (2024 10:38)  Cardizem  mg/24 hours oral tablet, extended release: 1 tab(s) orally once a day (2024 10:38)  cholecalciferol 25 mcg (1000 intl units) oral tablet: 1 tab(s) orally once a day (2024 10:38)  Colace 100 mg oral capsule: 2 cap(s) orally once a day (2024 10:38)  cyanocobalamin 1000 mcg oral tablet: 1 tab(s) orally once a day (2024 10:38)  doxazosin 1 mg oral tablet: 1 tab(s) orally once a day (at bedtime) (2024 10:38)  Farxiga 10 mg oral tablet: 1 tab(s) orally once a day (2024 10:38)  folic acid 1 mg oral tablet: 1 tab(s) orally once a day (2024 10:38)  furosemide 20 mg oral tablet: 2 tab(s) orally 2 times a day (2024 13:44)  gabapentin 400 mg oral capsule: 1 cap(s) orally 3 times a day (2024 10:38)  meclizine 25 mg oral tablet: 1 tab(s) orally every 8 hours as needed for Dizziness (2024 11:51)  Metoprolol Tartrate 25 mg oral tablet: 1 tab(s) orally 2 times a day (2024 10:38)  Multiple Vitamins oral tablet: 1 tab(s) orally once a day (2024 11:51)  omeprazole 40 mg oral delayed release capsule: 1 cap(s) orally once a day (2024 10:38)  Plavix 75 mg oral tablet: 1 tab(s) orally once a day (2024 13:45)  psyllium 500 mg oral capsule: 2 cap(s) orally once a day (2024 10:38)  QUEtiapine 50 mg oral tablet: 2 tab(s) orally once a day (at bedtime) (2024 10:38)  spironolactone 25 mg oral tablet: 2 tab(s) orally once a day (2024 10:38)  thiamine 100 mg oral tablet: 1 tab(s) orally once a day (2024 10:38)    MEDICATIONS  (STANDING):  albuterol/ipratropium for Nebulization 3 milliLiter(s) Nebulizer every 6 hours  aspirin enteric coated 81 milliGRAM(s) Oral daily  atorvastatin 80 milliGRAM(s) Oral at bedtime  budesonide 160 MICROgram(s)/formoterol 4.5 MICROgram(s) Inhaler 2 Puff(s) Inhalation two times a day  busPIRone 10 milliGRAM(s) Oral two times a day  clopidogrel Tablet 75 milliGRAM(s) Oral daily  diltiazem    Tablet 60 milliGRAM(s) Oral every 8 hours  doxazosin 1 milliGRAM(s) Oral at bedtime  doxycycline IVPB 100 milliGRAM(s) IV Intermittent every 12 hours  furosemide    Tablet 40 milliGRAM(s) Oral two times a day  gabapentin 400 milliGRAM(s) Oral three times a day  guaiFENesin ER 1200 milliGRAM(s) Oral every 12 hours  heparin   Injectable 5000 Unit(s) SubCutaneous every 12 hours  influenza  Vaccine (HIGH DOSE) 0.7 milliLiter(s) IntraMuscular once  methylPREDNISolone sodium succinate Injectable 40 milliGRAM(s) IV Push daily  metoprolol tartrate 25 milliGRAM(s) Oral two times a day  multivitamin 1 Tablet(s) Oral daily  pantoprazole    Tablet 40 milliGRAM(s) Oral before breakfast  piperacillin/tazobactam IVPB.. 3.375 Gram(s) IV Intermittent every 8 hours  polyethylene glycol 3350 17 Gram(s) Oral daily  QUEtiapine 100 milliGRAM(s) Oral at bedtime  senna 2 Tablet(s) Oral at bedtime  spironolactone 50 milliGRAM(s) Oral daily    MEDICATIONS  (PRN):  acetaminophen     Tablet .. 650 milliGRAM(s) Oral every 6 hours PRN Temp greater or equal to 38C (100.4F), Mild Pain (1 - 3)  aluminum hydroxide/magnesium hydroxide/simethicone Suspension 30 milliLiter(s) Oral every 4 hours PRN Dyspepsia  melatonin 3 milliGRAM(s) Oral at bedtime PRN Insomnia  ondansetron Injectable 4 milliGRAM(s) IV Push every 8 hours PRN Nausea and/or Vomiting    Vital Signs Last 24 Hrs  T(C): 36.9 (2024 20:28), Max: 36.9 (2024 20:28)  T(F): 98.5 (2024 20:28), Max: 98.5 (2024 20:28)  HR: 97 (2024 07:52) (73 - 97)  BP: 119/83 (2024 07:52) (103/56 - 119/83)  BP(mean): --  RR: 18 (2024 07:52) (18 - 18)  SpO2: 97% (2024 07:52) (97% - 99%)    Parameters below as of 2024 07:52  Patient On (Oxygen Delivery Method): nasal cannula  O2 Flow (L/min): 4    Daily     Daily Weight in k.5 (2024 10:40)    I&O's Summary      PHYSICAL EXAM:    Constitutional: NAD, awake and alert, morbid obesity   HEENT: PERR, EOMI,  No oral cyananosis.  Neck:  supple,  No JVD  Respiratory: Breath sounds with bibasilar rales   Cardiovascular: S1 and S2, IR IR no Murmurs, gallops or rubs  Gastrointestinal: Bowel Sounds present, soft, nontender.   Extremities:1+ RLE edema. No clubbing or cyanosis.  Vascular: 2+ peripheral pulses  LEft BKA with prosthesis   Neurological: A/O x 3, no focal deficits  Musculoskeletal: no calf tenderness.  Skin: No rashes.      LABS: All Labs Reviewed:                          9.0    11.65 )-----------( 172      ( 2024 06:58 )             29.6                             8.5    14.77 )-----------( 177      ( 2024 06:42 )             28.4                           8.9    15.12 )-----------( 152      ( 2024 07:42 )             28.6                         11.0   10.95 )-----------( 189      ( 2024 09:13 )             36.0     02    137  |  101  |  28<H>  ----------------------------<  120<H>  3.5   |  31  |  0.90    Ca    8.7      2024 06:58        02-    134<L>  |  101  |  25<H>  ----------------------------<  151<H>  3.9   |  31  |  0.84    Ca    9.1      2024 06:42        2024 07:42    135    |  100    |  22     ----------------------------<  169    3.8     |  28     |  1.02   2024 09:13    134    |  98     |  13     ----------------------------<  130    3.8     |  35     |  0.91     Ca    8.9        2024 07:42  Ca    9.4        2024 09:13  Mg     1.6       2024 09:13    TPro  7.7    /  Alb  3.4    /  TBili  0.7    /  DBili  x      /  AST  11     /  ALT  11     /  AlkPhos  109    2024 09:13    PT/INR - ( 2024 10:17 )   PT: 12.3 sec;   INR: 1.09 ratio         PTT - ( 2024 10:17 )  PTT:29.7 sec    - TroponinI hsT: <-7.01, <-5.83    Blood Culture:         RADIOLOGY/EKG:  < from: 12 Lead ECG (24 @ 09:28) >    Diagnosis Line *** Critical Test Result: High HR  Atrial fibrillation with rapid ventricular response  Possible Anterior infarct (cited on or before 17-AUG-2017)  Abnormal ECG  Confirmed by Dominick Middleton) on 2024 10:43:34 PM    < end of copied text >  < from: MYA Complete w/Spect and Color (23 @ 15:07) >     Normal left ventricular size and systolic function. Estimated LVEF is   60-65%.   Left atrial occlusion device present with small jose-device leak. No   evidence of jose-device thrombus.   Moderate mitral and tricuspid regurgitation.    < end of copied text >  < from: MYA Complete w/Spect and Color (23 @ 15:07) >  Mitral Valve   Moderate mitral regurgitation.     Aortic Valve   Normal aortic valve structure and function.     Tricuspid Valve   Moderate tricuspid valve regurgitation.     Left Atrium   There is a left atrial occlusion device present. Small jose-device leak.   No evidence of jose-device thrombus.   A saline contrast study was performed and showed no evidence of a patent   foramen ovale.     Left Ventricle   The left ventricle is normal in size, wall thickness, wall motion and   contractility.   Estimated LVEF 60-65%.    < end of copied text >  < from: Xray Chest 1 View- PORTABLE-Urgent (24 @ 10:11) >    INTERPRETATION:  AP erect chest on 2024 at 9:55 AM. Patient   has chest pain.    Heart is quite enlarged.    There is persistent CHF but it is somewhat improved from 2023.    IMPRESSION: Somewhat improved CHF from prior. There is a significant   residual.    --- End of Report ---        < end of copied text >

## 2024-03-01 NOTE — PROGRESS NOTE ADULT - ASSESSMENT
- cont O2 as needed  - had acute onset of sob yesterday w no cough, fever or chills  - cxr more consistent w chf and has bilat crackles on exam  - Last MYA showed EF 60%. Probable diastolic dysfunction  - cont diuresis  - remains on abx  - cont symbicort/steroids. Can change to prednisone  - hg down to 8.5  - discharge planning

## 2024-03-01 NOTE — PROGRESS NOTE ADULT - PROBLEM SELECTOR PROBLEM 2
Acute on chronic diastolic congestive heart failure
Acute on chronic diastolic congestive heart failure
Pneumonia
Pneumonia
Acute on chronic diastolic congestive heart failure

## 2024-03-01 NOTE — PROGRESS NOTE ADULT - PROBLEM SELECTOR PROBLEM 4
Chronic obstructive pulmonary disease, unspecified COPD type
Morbid obesity with alveolar hypoventilation
Morbid obesity with alveolar hypoventilation
Chronic obstructive pulmonary disease, unspecified COPD type
Chronic obstructive pulmonary disease, unspecified COPD type

## 2024-03-01 NOTE — DISCHARGE NOTE NURSING/CASE MANAGEMENT/SOCIAL WORK - NSSCCONTNUM_GEN_ALL_CORE
76 y/o F DM on insulin, HTN, CKD, CHFpEF, breast CA, respiratory arrest and cardiac arrest (2018), CVA with residual weakness, aspiration PNA h/o trache, PEG, both removed presents with respiratory distress requiring intubation. Found to have elevated BNP, may be 2/2 to volume overload i/s/o CKD vs CHF.     Neuro ;  - Sedated : Baseline MS AOx3   -WAS ON PROPOFOL AND FENTANYL BEFORE: NOW OFF:    - Monitor her mental status:  requiring only 30% fio2:    -8/15: - now she has been off sedation for more then 24 hours but is still lethargic: her o2 requirement has not increased:   -for possible extubation if she wakes up   :: -dw PMD: pt is still lethargic  for MRI brain stem today   ": -had ct head: OK: awaiting MRI brain : still lethargic  : seemed same to me: family at bedside who says she is little  bit more awake: oxygen requirement is same:  at 30%:off vasopressors   -now neuro note reviewed:  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 reason for inability ot trigger vent and co2 retention initially ? candidate for trach    -now the co2 i s normal : not much improvement in her mental statis:  off sedation for days   /: still lethargic: on precedex;  cxr reviewed:  last time at [facility] she was found to have cavity in RUL : she was ruled out for tb : her AFB was positive but was MAC /; defer to MICU   "  still sedated:  no sob:  on 30% fio2:  on precedex:  and is on nicardipine as well as labetalol ; for renal biopsy today  : seems OK: more alert and awake:  renal biopsy is still pending:  dw family   : doing OK: alert and awake:  but still intubated:  getting cpap : no renal biopsy done   CVA   - cont aspirin and statin   //-per neurology   : sedated  neuro followin/23:edation:  seems to be doing better:  plan for extubation if family refused for biopsy   : more aeake and alert :   Cardiac   -CHFpEF : TTE 2023 with EF 59%, with severe LVH and diastolic dysfunction : pro-BNP > 14317, trop 78   -on bumex drip :  -cards following   8/15: : she is off bumex drip and is on midodrine   : remains off bumex  :: off bumex:  defer to MICU team   cont to remain off diuretics   : she seems same to me: in renal failure off diuretics   : per Micu   : seems stable  Pulmonary   -Acute hypercapnic and hypoxemic respiratory failure   -DDx: aspiration vs volume overload  VBG with respiratory acidosis pCO2 111  - CXR with small right pleural effusion, no consolidations/opacities  -now she seems better:  fio2: 30$:  -? etiology of hypercarbia:  multifactorial : seems to have OHS and TYRONE superimposed by acute chf  ? and aspiration pneumonia:"   -pt is mostly bed bound:   -it is possible that this time:  she mght need bipap on dc : atleast at the night time:   -on 8/15:  still remains intubated  :still lethargic:  not extubated hypercarbic acidotic today on abg:  weaning trials :probably would need bipap following extubation  : :still lethargic: :awaiting mri brain : dw    : :MRI done has new right cerebellar infarct on mri : defer to MICU   -seen by bora now:  has brain stem infarct to per neuro note:  reason for being vent;    : awaiting renal bipsy today  : and then aggressive weaning  : now no renal biopsy :    /Renal   -Hyperkalemia with EKG changes  : 7.2, hemolyzed, given insulin and D50 + calcium gluc   -K is better today : cont to monitor  -normal today on 8/15   8/16:stable  :-k has been ok for l ast few days  : stable  : started on prednisone yesterday for suspected AIN by renal    : wbc is high : likely secondary to steroids  she has no fveR:  ruled out for tb last time   : for renal biopsy today   : RENAL  BIOPSY NOT DONE:  FAMILY IS THINKING about itl   : biopsy not done: urine output increased:   GI NPO for now  Endocrine  T2DM : A1C 7.1 in 2023   -cont to monitor blood glucose  ID   - No fever/leukocytosis  - Hx latent TB which was treated, no concern for TB  : She was recently ruled out for tuberculosis at [facility]   - Started on empiric vancomycin and aztreonam (cefepime/zosyn allergy? developed rash req prednisone)  : now dced:   defer to MICU   - f/u MRSA   - follow up blood culture/urine   :blood cultures negative so far: legionella is negative:  remains off antibiotics  staph aureus on sputum   :: off antibiotics at this time: secondary to drug eruption  : remains off antibiotics   -: off diuretics   : off antibiotics   : off antibiotics   : off antibiotics   : off antibtiocs    dw micu and  at bedside as well as MICU attending   overall prognosis seems guarded              [phone]

## 2024-03-01 NOTE — DISCHARGE NOTE PROVIDER - NSDCFUSCHEDAPPT_GEN_ALL_CORE_FT
Zahida Alcantar  [facility] Physician Replaced by [facility]  CARDIOLOGY [address]  Scheduled Appointment: 03/04/2024    Elizabeth Garcia  [facility] Physician Replaced by [facility]  COLOSURG [address]  Scheduled Appointment: 03/11/2024    Dominick Middleton  [facility] Physician Replaced by [facility]  CARDIOLOGY [address]  Scheduled Appointment: 03/20/2024    [facility]  INTMED [address]  Scheduled Appointment: 04/17/2024    Wicho Mckenna  [facility]  INTMED [address]  Scheduled Appointment: 04/17/2024

## 2024-03-01 NOTE — PROGRESS NOTE ADULT - PROBLEM SELECTOR PLAN 4
·  Problem: Chronic obstructive pulmonary disease, unspecified COPD type.   ·  Recommendation: as above , on home oxygen ,  secondary  pulmonary hypertension  secondary to copd and diastolic heart failure   as per pulmonary recommendations.
·  Problem: Chronic obstructive pulmonary disease, unspecified COPD type.   ·  Recommendation: as above , on home oxygen ,  secondary  pulmonary hypertension  secondary to copd and diastolic heart failure   as per pulmonary recommendations.
On home oxygen secondary pHTN d/t to copd and diastolic heart failure   pulmonary following

## 2024-03-01 NOTE — PROGRESS NOTE ADULT - PROBLEM SELECTOR PROBLEM 1
Acute on chronic diastolic congestive heart failure
Acute on chronic diastolic congestive heart failure
Shortness of breath

## 2024-03-01 NOTE — PROGRESS NOTE ADULT - NS ATTEND AMEND GEN_ALL_CORE FT
Agree with the above.   HR improved; will cont diuresis
I discussed case with CELSO Hollis and Dr. Handy; management as described above.
Plan of care discussed with NP. Agree with plan

## 2024-03-01 NOTE — PROGRESS NOTE ADULT - PROBLEM SELECTOR PLAN 2
·  Problem: Acute on chronic diastolic congestive heart failure.   ·  Recommendation: as above received one dose IV lasix 40 mg extra , follow up cxr 2/26 showed somewhat improved CHF from prior study.  There is still significant residual.  Continue lasix 40mg PO BID, BB, moustapha.  Continue daily weights.
Pt appears mildly volume overloaded had received dose of IV lasix yesterday will CW lasix and spironolactone     Daily standing weight
·  Problem: Acute on chronic diastolic congestive heart failure.   ·  Recommendation: Pt received one dose IV lasix last night.  Follow up cxr 2/26 showed somewhat improved CHF from prior study.  There is still significant residual.  Pt appears mildly volume overloaded.  Will give one dose IV lasix now.  Continue lasix 40mg PO BID, BB, moustapha.  Continue daily STANDING weights.

## 2024-03-01 NOTE — DISCHARGE NOTE PROVIDER - NSDCMRMEDTOKEN_GEN_ALL_CORE_FT
albuterol 90 mcg/inh inhalation aerosol: 2 puff(s) inhaled every 4 hours, As Needed for wheezing  Aspirin Enteric Coated 81 mg oral delayed release tablet: 1 tab(s) orally once a day  atorvastatin 80 mg oral tablet: 1 tab(s) orally once a day (at bedtime)  budesonide-formoterol 160 mcg-4.5 mcg/inh inhalation aerosol: 2 puff(s) inhaled 2 times a day  busPIRone 10 mg oral tablet: 1 tab(s) orally 2 times a day  Cardizem  mg/24 hours oral tablet, extended release: 1 tab(s) orally once a day  cefuroxime 500 mg oral tablet: 1 tab(s) orally every 12 hours  cholecalciferol 25 mcg (1000 intl units) oral tablet: 1 tab(s) orally once a day  Colace 100 mg oral capsule: 2 cap(s) orally once a day  cyanocobalamin 1000 mcg oral tablet: 1 tab(s) orally once a day  doxazosin 1 mg oral tablet: 1 tab(s) orally once a day (at bedtime)  doxycycline hyclate 100 mg oral tablet: 1 tab(s) orally every 12 hours  Farxiga 10 mg oral tablet: 1 tab(s) orally once a day  folic acid 1 mg oral tablet: 1 tab(s) orally once a day  furosemide 20 mg oral tablet: 2 tab(s) orally 2 times a day  gabapentin 400 mg oral capsule: 1 cap(s) orally 3 times a day  meclizine 25 mg oral tablet: 1 tab(s) orally every 8 hours as needed for Dizziness  Metoprolol Tartrate 25 mg oral tablet: 1 tab(s) orally 2 times a day  Multiple Vitamins oral tablet: 1 tab(s) orally once a day  omeprazole 40 mg oral delayed release capsule: 1 cap(s) orally once a day  Plavix 75 mg oral tablet: 1 tab(s) orally once a day  polyethylene glycol 3350 oral powder for reconstitution: 17 gram(s) orally once a day  predniSONE 10 mg oral tablet: 1 tab(s) orally once a day Please take Prednisone 40mg for 3 days,   then take 30mg for next 3 days,   then take 20mg for next 3 days and   then take 10mg for 3 days.  psyllium 500 mg oral capsule: 2 cap(s) orally once a day  QUEtiapine 50 mg oral tablet: 2 tab(s) orally once a day (at bedtime)  spironolactone 25 mg oral tablet: 2 tab(s) orally once a day  thiamine 100 mg oral tablet: 1 tab(s) orally once a day

## 2024-03-01 NOTE — DISCHARGE NOTE PROVIDER - CARE PROVIDER_API CALL
Lon Hein  Cardiovascular Disease  [address], [address]  [place] [address]  Phone: [phone]  Fax: [phone]  Established Patient  Follow Up Time:

## 2024-03-01 NOTE — DISCHARGE NOTE NURSING/CASE MANAGEMENT/SOCIAL WORK - NSDCVIVACCINE_GEN_ALL_CORE_FT
influenza, injectable, quadrivalent, preservative free; 21-Nov-2019 14:53; Cathryn Burks (RN); [employer]; [id number] (Exp. Date: 30-Jun-2020); IntraMuscular; Deltoid Left.; 0.5 milliLiter(s); VIS (VIS Published: 15-Aug-2019, VIS Presented: 21-Nov-2019);   influenza, injectable, quadrivalent, preservative free; 17-Oct-2020 16:13; Bhavya Brown (RN); [employer]; [id number] (Exp. Date: 30-Jun-2021); IntraMuscular; Deltoid Left.; 0.5 milliLiter(s); VIS (VIS Published: 15-Aug-2019, VIS Presented: 17-Oct-2020);

## 2024-03-01 NOTE — PROGRESS NOTE ADULT - PROBLEM SELECTOR PLAN 3
·  Problem: Chronic atrial fibrillation.   ·  Recommendation: with rapid ventricular rate was on IV cardizem drip , with fever , now improved  rate , on home dose cardizem   patient has hx of recurrent GI bleed , has watchman device , on asa and plavix , continue medication     patient does have intermittent hemorrhoidal bleed , chronic anemia.
·  Problem: Chronic atrial fibrillation.   ·  Recommendation: with rapid ventricular rate was on IV cardizem drip , with fever , now improved  rate , on home dose cardizem   patient has hx of recurrent GI bleed , has watchman device , on asa and plavix , continue medication     patient does have intermittent hemorrhoidal bleed , chronic anemia.
AF/RVR alejandro 2/2 hypoxia/ PNA/ fever.    S/p watchmans.  No AC.  Tele currently AF V paced HR 60     CVA 11/2023 CW DAPT for now

## 2024-03-01 NOTE — DISCHARGE NOTE PROVIDER - NSDCCPCAREPLAN_GEN_ALL_CORE_FT
PRINCIPAL DISCHARGE DIAGNOSIS  Diagnosis: Acute respiratory failure with hypoxia  Assessment and Plan of Treatment: Please continue to take Ceftin/Doxycycline as prescribed. Please take Prednisone as prescribed   Please take Prednisone 40mg for 3 days, then take 30mg for next 3 days, then take 20mg for next 3 days and then take 10mg for 3 days.  Please follow up with your PCP, Pulmonologist soon after discharge      SECONDARY DISCHARGE DIAGNOSES  Diagnosis: Acute on chronic diastolic congestive heart failure  Assessment and Plan of Treatment: Please continue to take Lasix as prescribed. Please follow up with your Cardiologist soon after discharge    Diagnosis: Pneumonia  Assessment and Plan of Treatment:     Diagnosis: COPD exacerbation  Assessment and Plan of Treatment:

## 2024-03-01 NOTE — PROGRESS NOTE ADULT - PROBLEM SELECTOR PLAN 1
·  Problem: Shortness of breath.   ·  Recommendation: multifactorial predominantly pulmonary exacerbation of COPD with pneumonia ,fever , diastolic heart failure with afib rapid rate , responded well to bronchodilators and steroids.    .  BNP elevated 1156.  CXR showed vascular congestion.  Still appears mildly volume overloaded.  Will give one dose IV lasix x 1 now.  Continue PO lasix 40mg BID.   CW IV antibiotics, steroids.  Please perform STANDING weight daily
·  Problem: Shortness of breath.   ·  Recommendation: multifactorial predominantly pulmonary exacerbation of COPD with pneumonia ,fever , diastolic heart failure with afib rapid rate , responded well to bronchodilators and steroids.    .  BNP elevated 1156.  CXR showed vascular congestion.  Received one dose IV lasix.  Now on lasix 40mg PO BID.  CW IV antibiotics, steroids.
multifactorial predominantly pulmonary exacerbation of COPD with pneumonia ,fever , diastolic heart failure/AF RVR  HR improved

## 2024-03-01 NOTE — DISCHARGE NOTE PROVIDER - HOSPITAL COURSE
69 y/o male with PMHX of chronic AFIB s/p watchmans, hx of ETOH abuse, cirrhosis, chronic diastolic CHF, COPD, chronic hypoxic respiratory failure s/p trach and PEG now decannulated on 4L home O2, left BKA, CVA Nov 2023 on DAPT who presented to  with CC of SOB.  Patient notes SOB started last evening.  He felt increased WOB overnight and started with coughing.  Patient normally uses 4 L NC O2 continuous.  He had this on and was still feeling SOB.  Patient notes he was coughing up significant phlegm.  Patient presented to the ER for evaluation.  In the ER, patient found to be febrile-- temp 102.7.  Tachy 140's.  Tachynea with RR 26.  CXR with RLL PNA.  Admitted for respiratory failure/ PNA/ COPD exacerbation.      Sub: Pt seen and evaluated. Feeling better. No other acute complaints.     Vital Signs Last 24 Hrs  T(C): 37.1 (01 Mar 2024 08:34), Max: 37.1 (01 Mar 2024 08:34)  T(F): 98.7 (01 Mar 2024 08:34), Max: 98.7 (01 Mar 2024 08:34)  HR: 93 (01 Mar 2024 08:34) (82 - 96)  BP: 123/58 (01 Mar 2024 08:34) (103/56 - 123/58)  RR: 18 (01 Mar 2024 08:34) (18 - 18)  SpO2: 96% (01 Mar 2024 08:34) (95% - 96%)    Parameters below as of 01 Mar 2024 08:34  Patient On (Oxygen Delivery Method): nasal cannula      69 y/o male with PMHX of chronic AFIB s/p watchmans, hx of ETOH abuse, cirrhosis, chronic diastolic CHF, COPD, chronic hypoxic respiratory failure s/p trach/ peg now decannulated on 4L home O2, left BKA, CVA Nov 2023 on DAPT who presented to  with CC of SOB.  In the ER, patient found to be febrile-- temp 102.7.  Tachy 140's.  CXR with RLL PNA.  Admitted for respiratory failure/ PNA/ COPD exacerbation.      #Acute on Chronic Respiratory Failure secondary to Sepsis secondary to PNA/ COPD Exacerbation:    -CXR:: Reviewed  -Zosyn and Doxy-->switch to Doxy/Ceftin  -Blood cultures: No growth to date  -IV steroids/ nebs/ inhalers. -->PO Prednisone taper   -supportive management   -suppplemental oxygen PRN with target SpO2>92%]      #Rapid AFIB with RVR:, currently rate control   Suspect related to hypoxia/ PNA/ fever.    S/p watchmans.  No AC.    Cont metoprolol/ cardizem.      #Recent CVA Nov 2023:    Cont DAPT for now.        #Hx of cirrhosis/ ETOH:    Cont lasix/ spironolactone.      #Chronic diastolic CHF:    -IV Lasix-->PO Lasix  -f/u with cardiology         daughter -  Litzy Boone [phone].

## 2024-03-01 NOTE — DISCHARGE NOTE NURSING/CASE MANAGEMENT/SOCIAL WORK - PATIENT PORTAL LINK FT
You can access the FollowMyHealth Patient Portal offered by [facility] by registering at the following website: [email or web address]. By joining [email or web address]’s FollowMyHealth portal, you will also be able to view your health information using other applications (apps) compatible with our system.

## 2024-03-01 NOTE — PROGRESS NOTE ADULT - PROBLEM SELECTOR PROBLEM 3
Chronic obstructive pulmonary disease, unspecified COPD type
Chronic atrial fibrillation
Chronic atrial fibrillation
Chronic obstructive pulmonary disease, unspecified COPD type
Chronic atrial fibrillation

## 2024-03-02 RX ORDER — CEFUROXIME AXETIL 250 MG
1 TABLET ORAL
Qty: 6 | Refills: 0
Start: 2024-03-02 | End: 2024-03-04

## 2024-03-04 ENCOUNTER — APPOINTMENT (OUTPATIENT)
Dept: CARDIOLOGY | Facility: CLINIC | Age: 69
End: 2024-03-04

## 2024-03-04 ENCOUNTER — NON-APPOINTMENT (OUTPATIENT)
Age: 69
End: 2024-03-04

## 2024-03-06 NOTE — H&P ADULT - NSICDXPASTMEDICALHX_GEN_ALL_CORE_FT
Addended by: KAROLINE VO on: 3/6/2024 11:33 AM     Modules accepted: Orders    
PAST MEDICAL HISTORY:  Alcohol abuse     Alcohol withdrawal     Anemia     Atrial fibrillation     CHF (congestive heart failure)     Chronic atrial fibrillation     Chronic CHF     Chronic obstructive pulmonary disease (COPD)     Cirrhosis     Collapsed lung     COPD without exacerbation     Emphysema of lung     Falls     GERD (gastroesophageal reflux disease)     Hypertension     Meningitis     Poor historian     Presence of Watchman left atrial appendage closure device

## 2024-03-08 DIAGNOSIS — Z89.512 ACQUIRED ABSENCE OF LEFT LEG BELOW KNEE: ICD-10-CM

## 2024-03-08 DIAGNOSIS — J18.9 PNEUMONIA, UNSPECIFIED ORGANISM: ICD-10-CM

## 2024-03-08 DIAGNOSIS — Z66 DO NOT RESUSCITATE: ICD-10-CM

## 2024-03-08 DIAGNOSIS — A41.9 SEPSIS, UNSPECIFIED ORGANISM: ICD-10-CM

## 2024-03-08 DIAGNOSIS — I48.20 CHRONIC ATRIAL FIBRILLATION, UNSPECIFIED: ICD-10-CM

## 2024-03-08 DIAGNOSIS — J44.1 CHRONIC OBSTRUCTIVE PULMONARY DISEASE WITH (ACUTE) EXACERBATION: ICD-10-CM

## 2024-03-08 DIAGNOSIS — E66.2 MORBID (SEVERE) OBESITY WITH ALVEOLAR HYPOVENTILATION: ICD-10-CM

## 2024-03-08 DIAGNOSIS — J44.0 CHRONIC OBSTRUCTIVE PULMONARY DISEASE WITH (ACUTE) LOWER RESPIRATORY INFECTION: ICD-10-CM

## 2024-03-08 DIAGNOSIS — K21.9 GASTRO-ESOPHAGEAL REFLUX DISEASE WITHOUT ESOPHAGITIS: ICD-10-CM

## 2024-03-08 DIAGNOSIS — J96.21 ACUTE AND CHRONIC RESPIRATORY FAILURE WITH HYPOXIA: ICD-10-CM

## 2024-03-08 DIAGNOSIS — Z79.01 LONG TERM (CURRENT) USE OF ANTICOAGULANTS: ICD-10-CM

## 2024-03-08 DIAGNOSIS — K70.30 ALCOHOLIC CIRRHOSIS OF LIVER WITHOUT ASCITES: ICD-10-CM

## 2024-03-08 DIAGNOSIS — I11.0 HYPERTENSIVE HEART DISEASE WITH HEART FAILURE: ICD-10-CM

## 2024-03-08 DIAGNOSIS — I50.33 ACUTE ON CHRONIC DIASTOLIC (CONGESTIVE) HEART FAILURE: ICD-10-CM

## 2024-03-11 ENCOUNTER — APPOINTMENT (OUTPATIENT)
Dept: COLORECTAL SURGERY | Facility: CLINIC | Age: 69
End: 2024-03-11

## 2024-03-12 ENCOUNTER — INPATIENT (INPATIENT)
Facility: HOSPITAL | Age: 69
LOS: 0 days | Discharge: HOSPICE | DRG: 951 | End: 2024-03-12
Attending: HOSPITALIST | Admitting: HOSPITALIST
Payer: MEDICARE

## 2024-03-12 VITALS — HEART RATE: 148 BPM | SYSTOLIC BLOOD PRESSURE: 74 MMHG | DIASTOLIC BLOOD PRESSURE: 57 MMHG

## 2024-03-12 VITALS
WEIGHT: 250 LBS | HEIGHT: 72 IN | TEMPERATURE: 98 F | DIASTOLIC BLOOD PRESSURE: 47 MMHG | OXYGEN SATURATION: 88 % | SYSTOLIC BLOOD PRESSURE: 110 MMHG | HEART RATE: 93 BPM | RESPIRATION RATE: 22 BRPM

## 2024-03-12 DIAGNOSIS — J18.9 PNEUMONIA, UNSPECIFIED ORGANISM: ICD-10-CM

## 2024-03-12 DIAGNOSIS — Z98.890 OTHER SPECIFIED POSTPROCEDURAL STATES: Chronic | ICD-10-CM

## 2024-03-12 DIAGNOSIS — J96.01 ACUTE RESPIRATORY FAILURE WITH HYPOXIA: ICD-10-CM

## 2024-03-12 DIAGNOSIS — S88.119A COMPLETE TRAUMATIC AMPUTATION AT LEVEL BETWEEN KNEE AND ANKLE, UNSPECIFIED LOWER LEG, INITIAL ENCOUNTER: Chronic | ICD-10-CM

## 2024-03-12 DIAGNOSIS — Z89.512 ACQUIRED ABSENCE OF LEFT LEG BELOW KNEE: Chronic | ICD-10-CM

## 2024-03-12 DIAGNOSIS — Z93.1 GASTROSTOMY STATUS: Chronic | ICD-10-CM

## 2024-03-12 DIAGNOSIS — I50.33 ACUTE ON CHRONIC DIASTOLIC (CONGESTIVE) HEART FAILURE: ICD-10-CM

## 2024-03-12 DIAGNOSIS — Z95.818 PRESENCE OF OTHER CARDIAC IMPLANTS AND GRAFTS: Chronic | ICD-10-CM

## 2024-03-12 LAB
ALBUMIN SERPL ELPH-MCNC: 3 G/DL — LOW (ref 3.3–5)
ALP SERPL-CCNC: 90 U/L — SIGNIFICANT CHANGE UP (ref 40–120)
ALT FLD-CCNC: 12 U/L — SIGNIFICANT CHANGE UP (ref 12–78)
ANION GAP SERPL CALC-SCNC: 4 MMOL/L — LOW (ref 5–17)
AST SERPL-CCNC: 9 U/L — LOW (ref 15–37)
BASE EXCESS BLDV CALC-SCNC: 10.5 MMOL/L — HIGH (ref -2–3)
BASOPHILS # BLD AUTO: 0.02 K/UL — SIGNIFICANT CHANGE UP (ref 0–0.2)
BASOPHILS NFR BLD AUTO: 0.2 % — SIGNIFICANT CHANGE UP (ref 0–2)
BILIRUB SERPL-MCNC: 0.3 MG/DL — SIGNIFICANT CHANGE UP (ref 0.2–1.2)
BUN SERPL-MCNC: 20 MG/DL — SIGNIFICANT CHANGE UP (ref 7–23)
CALCIUM SERPL-MCNC: 8.5 MG/DL — SIGNIFICANT CHANGE UP (ref 8.5–10.1)
CHLORIDE SERPL-SCNC: 99 MMOL/L — SIGNIFICANT CHANGE UP (ref 96–108)
CO2 SERPL-SCNC: 36 MMOL/L — HIGH (ref 22–31)
CREAT SERPL-MCNC: 0.92 MG/DL — SIGNIFICANT CHANGE UP (ref 0.5–1.3)
EGFR: 92 ML/MIN/1.73M2 — SIGNIFICANT CHANGE UP
EOSINOPHIL # BLD AUTO: 0.02 K/UL — SIGNIFICANT CHANGE UP (ref 0–0.5)
EOSINOPHIL NFR BLD AUTO: 0.2 % — SIGNIFICANT CHANGE UP (ref 0–6)
FLUAV AG NPH QL: SIGNIFICANT CHANGE UP
FLUBV AG NPH QL: SIGNIFICANT CHANGE UP
GLUCOSE SERPL-MCNC: 113 MG/DL — HIGH (ref 70–99)
HCO3 BLDV-SCNC: 39 MMOL/L — HIGH (ref 22–29)
HCT VFR BLD CALC: 29.7 % — LOW (ref 39–50)
HGB BLD-MCNC: 8.6 G/DL — LOW (ref 13–17)
IMM GRANULOCYTES NFR BLD AUTO: 0.5 % — SIGNIFICANT CHANGE UP (ref 0–0.9)
LACTATE SERPL-SCNC: 1.3 MMOL/L — SIGNIFICANT CHANGE UP (ref 0.7–2)
LYMPHOCYTES # BLD AUTO: 1.69 K/UL — SIGNIFICANT CHANGE UP (ref 1–3.3)
LYMPHOCYTES # BLD AUTO: 19.2 % — SIGNIFICANT CHANGE UP (ref 13–44)
MCHC RBC-ENTMCNC: 24.5 PG — LOW (ref 27–34)
MCHC RBC-ENTMCNC: 29 GM/DL — LOW (ref 32–36)
MCV RBC AUTO: 84.6 FL — SIGNIFICANT CHANGE UP (ref 80–100)
MONOCYTES # BLD AUTO: 0.67 K/UL — SIGNIFICANT CHANGE UP (ref 0–0.9)
MONOCYTES NFR BLD AUTO: 7.6 % — SIGNIFICANT CHANGE UP (ref 2–14)
NEUTROPHILS # BLD AUTO: 6.37 K/UL — SIGNIFICANT CHANGE UP (ref 1.8–7.4)
NEUTROPHILS NFR BLD AUTO: 72.3 % — SIGNIFICANT CHANGE UP (ref 43–77)
NT-PROBNP SERPL-SCNC: 1533 PG/ML — HIGH (ref 0–125)
PCO2 BLDV: 73 MMHG — HIGH (ref 42–55)
PH BLDV: 7.34 — SIGNIFICANT CHANGE UP (ref 7.32–7.43)
PLATELET # BLD AUTO: 167 K/UL — SIGNIFICANT CHANGE UP (ref 150–400)
PO2 BLDV: 45 MMHG — SIGNIFICANT CHANGE UP (ref 25–45)
POTASSIUM SERPL-MCNC: 4 MMOL/L — SIGNIFICANT CHANGE UP (ref 3.5–5.3)
POTASSIUM SERPL-SCNC: 4 MMOL/L — SIGNIFICANT CHANGE UP (ref 3.5–5.3)
PROT SERPL-MCNC: 6.8 GM/DL — SIGNIFICANT CHANGE UP (ref 6–8.3)
RBC # BLD: 3.51 M/UL — LOW (ref 4.2–5.8)
RBC # FLD: 15.3 % — HIGH (ref 10.3–14.5)
RSV RNA NPH QL NAA+NON-PROBE: SIGNIFICANT CHANGE UP
SAO2 % BLDV: 72 % — SIGNIFICANT CHANGE UP (ref 67–88)
SARS-COV-2 RNA SPEC QL NAA+PROBE: SIGNIFICANT CHANGE UP
SODIUM SERPL-SCNC: 139 MMOL/L — SIGNIFICANT CHANGE UP (ref 135–145)
TROPONIN I, HIGH SENSITIVITY RESULT: 5.67 NG/L — SIGNIFICANT CHANGE UP
WBC # BLD: 8.81 K/UL — SIGNIFICANT CHANGE UP (ref 3.8–10.5)
WBC # FLD AUTO: 8.81 K/UL — SIGNIFICANT CHANGE UP (ref 3.8–10.5)

## 2024-03-12 PROCEDURE — 99285 EMERGENCY DEPT VISIT HI MDM: CPT

## 2024-03-12 PROCEDURE — 93010 ELECTROCARDIOGRAM REPORT: CPT | Mod: 76

## 2024-03-12 PROCEDURE — 71045 X-RAY EXAM CHEST 1 VIEW: CPT | Mod: 26,77

## 2024-03-12 PROCEDURE — 71250 CT THORAX DX C-: CPT | Mod: 26,MC

## 2024-03-12 PROCEDURE — 71045 X-RAY EXAM CHEST 1 VIEW: CPT

## 2024-03-12 PROCEDURE — 93005 ELECTROCARDIOGRAM TRACING: CPT

## 2024-03-12 PROCEDURE — 71045 X-RAY EXAM CHEST 1 VIEW: CPT | Mod: 26

## 2024-03-12 PROCEDURE — 0241U: CPT

## 2024-03-12 PROCEDURE — 93971 EXTREMITY STUDY: CPT | Mod: 26,RT

## 2024-03-12 PROCEDURE — 99223 1ST HOSP IP/OBS HIGH 75: CPT

## 2024-03-12 RX ORDER — HYDROMORPHONE HYDROCHLORIDE 2 MG/ML
1 INJECTION INTRAMUSCULAR; INTRAVENOUS; SUBCUTANEOUS
Refills: 0 | Status: DISCONTINUED | OUTPATIENT
Start: 2024-03-12 | End: 2024-03-12

## 2024-03-12 RX ORDER — PIPERACILLIN AND TAZOBACTAM 4; .5 G/20ML; G/20ML
3.38 INJECTION, POWDER, LYOPHILIZED, FOR SOLUTION INTRAVENOUS ONCE
Refills: 0 | Status: COMPLETED | OUTPATIENT
Start: 2024-03-12 | End: 2024-03-12

## 2024-03-12 RX ORDER — MECLIZINE HCL 12.5 MG
25 TABLET ORAL EVERY 8 HOURS
Refills: 0 | Status: DISCONTINUED | OUTPATIENT
Start: 2024-03-12 | End: 2024-03-12

## 2024-03-12 RX ORDER — ACETAMINOPHEN 500 MG
650 TABLET ORAL EVERY 6 HOURS
Refills: 0 | Status: DISCONTINUED | OUTPATIENT
Start: 2024-03-12 | End: 2024-03-12

## 2024-03-12 RX ORDER — FOLIC ACID 0.8 MG
1 TABLET ORAL
Refills: 0 | DISCHARGE

## 2024-03-12 RX ORDER — MECLIZINE HCL 12.5 MG
1 TABLET ORAL
Qty: 30 | Refills: 0 | DISCHARGE

## 2024-03-12 RX ORDER — OMEPRAZOLE 10 MG/1
1 CAPSULE, DELAYED RELEASE ORAL
Qty: 0 | Refills: 0 | DISCHARGE

## 2024-03-12 RX ORDER — DOXAZOSIN MESYLATE 4 MG
1 TABLET ORAL
Refills: 0 | DISCHARGE

## 2024-03-12 RX ORDER — METOPROLOL TARTRATE 50 MG
25 TABLET ORAL
Refills: 0 | Status: DISCONTINUED | OUTPATIENT
Start: 2024-03-12 | End: 2024-03-12

## 2024-03-12 RX ORDER — GABAPENTIN 400 MG/1
400 CAPSULE ORAL THREE TIMES A DAY
Refills: 0 | Status: DISCONTINUED | OUTPATIENT
Start: 2024-03-12 | End: 2024-03-12

## 2024-03-12 RX ORDER — ROBINUL 0.2 MG/ML
0.2 INJECTION INTRAMUSCULAR; INTRAVENOUS EVERY 6 HOURS
Refills: 0 | Status: DISCONTINUED | OUTPATIENT
Start: 2024-03-12 | End: 2024-03-12

## 2024-03-12 RX ORDER — DOXAZOSIN MESYLATE 4 MG
1 TABLET ORAL AT BEDTIME
Refills: 0 | Status: DISCONTINUED | OUTPATIENT
Start: 2024-03-12 | End: 2024-03-12

## 2024-03-12 RX ORDER — FUROSEMIDE 40 MG
40 TABLET ORAL DAILY
Refills: 0 | Status: DISCONTINUED | OUTPATIENT
Start: 2024-03-13 | End: 2024-03-12

## 2024-03-12 RX ORDER — MORPHINE SULFATE 50 MG/1
4 CAPSULE, EXTENDED RELEASE ORAL
Qty: 100 | Refills: 0 | Status: DISCONTINUED | OUTPATIENT
Start: 2024-03-12 | End: 2024-03-12

## 2024-03-12 RX ORDER — DILTIAZEM HCL 120 MG
1 CAPSULE, EXT RELEASE 24 HR ORAL
Refills: 0 | DISCHARGE

## 2024-03-12 RX ORDER — NITROGLYCERIN 6.5 MG
200 CAPSULE, EXTENDED RELEASE ORAL
Qty: 50 | Refills: 0 | Status: DISCONTINUED | OUTPATIENT
Start: 2024-03-12 | End: 2024-03-12

## 2024-03-12 RX ORDER — TIOTROPIUM BROMIDE 18 UG/1
2 CAPSULE ORAL; RESPIRATORY (INHALATION)
Refills: 0 | DISCHARGE

## 2024-03-12 RX ORDER — PSYLLIUM SEED (WITH DEXTROSE)
1 POWDER (GRAM) ORAL DAILY
Refills: 0 | Status: DISCONTINUED | OUTPATIENT
Start: 2024-03-12 | End: 2024-03-12

## 2024-03-12 RX ORDER — BUDESONIDE AND FORMOTEROL FUMARATE DIHYDRATE 160; 4.5 UG/1; UG/1
2 AEROSOL RESPIRATORY (INHALATION)
Refills: 0 | Status: DISCONTINUED | OUTPATIENT
Start: 2024-03-12 | End: 2024-03-12

## 2024-03-12 RX ORDER — FOLIC ACID 0.8 MG
1 TABLET ORAL DAILY
Refills: 0 | Status: DISCONTINUED | OUTPATIENT
Start: 2024-03-12 | End: 2024-03-12

## 2024-03-12 RX ORDER — SPIRONOLACTONE 25 MG/1
50 TABLET, FILM COATED ORAL DAILY
Refills: 0 | Status: DISCONTINUED | OUTPATIENT
Start: 2024-03-12 | End: 2024-03-12

## 2024-03-12 RX ORDER — DILTIAZEM HCL 120 MG
180 CAPSULE, EXT RELEASE 24 HR ORAL DAILY
Refills: 0 | Status: DISCONTINUED | OUTPATIENT
Start: 2024-03-12 | End: 2024-03-12

## 2024-03-12 RX ORDER — ATORVASTATIN CALCIUM 80 MG/1
1 TABLET, FILM COATED ORAL
Qty: 30 | Refills: 0 | DISCHARGE

## 2024-03-12 RX ORDER — ASPIRIN/CALCIUM CARB/MAGNESIUM 324 MG
81 TABLET ORAL DAILY
Refills: 0 | Status: DISCONTINUED | OUTPATIENT
Start: 2024-03-12 | End: 2024-03-12

## 2024-03-12 RX ORDER — DAPAGLIFLOZIN 10 MG/1
10 TABLET, FILM COATED ORAL EVERY 24 HOURS
Refills: 0 | Status: DISCONTINUED | OUTPATIENT
Start: 2024-03-12 | End: 2024-03-12

## 2024-03-12 RX ORDER — FUROSEMIDE 40 MG
40 TABLET ORAL ONCE
Refills: 0 | Status: COMPLETED | OUTPATIENT
Start: 2024-03-12 | End: 2024-03-12

## 2024-03-12 RX ORDER — DOCUSATE SODIUM 100 MG
2 CAPSULE ORAL
Refills: 0 | DISCHARGE

## 2024-03-12 RX ORDER — THIAMINE MONONITRATE (VIT B1) 100 MG
100 TABLET ORAL DAILY
Refills: 0 | Status: DISCONTINUED | OUTPATIENT
Start: 2024-03-12 | End: 2024-03-12

## 2024-03-12 RX ORDER — ALBUTEROL 90 UG/1
2 AEROSOL, METERED ORAL EVERY 6 HOURS
Refills: 0 | Status: DISCONTINUED | OUTPATIENT
Start: 2024-03-12 | End: 2024-03-12

## 2024-03-12 RX ORDER — METOPROLOL TARTRATE 50 MG
1 TABLET ORAL
Qty: 0 | Refills: 0 | DISCHARGE

## 2024-03-12 RX ORDER — HYDROMORPHONE HYDROCHLORIDE 2 MG/ML
2 INJECTION INTRAMUSCULAR; INTRAVENOUS; SUBCUTANEOUS ONCE
Refills: 0 | Status: DISCONTINUED | OUTPATIENT
Start: 2024-03-12 | End: 2024-03-12

## 2024-03-12 RX ORDER — THIAMINE MONONITRATE (VIT B1) 100 MG
1 TABLET ORAL
Refills: 0 | DISCHARGE

## 2024-03-12 RX ORDER — PREGABALIN 225 MG/1
1 CAPSULE ORAL
Refills: 0 | DISCHARGE

## 2024-03-12 RX ORDER — CHOLECALCIFEROL (VITAMIN D3) 125 MCG
2 CAPSULE ORAL
Refills: 0 | DISCHARGE

## 2024-03-12 RX ORDER — QUETIAPINE FUMARATE 200 MG/1
100 TABLET, FILM COATED ORAL AT BEDTIME
Refills: 0 | Status: DISCONTINUED | OUTPATIENT
Start: 2024-03-12 | End: 2024-03-12

## 2024-03-12 RX ORDER — ASPIRIN/CALCIUM CARB/MAGNESIUM 324 MG
1 TABLET ORAL
Qty: 0 | Refills: 0 | DISCHARGE

## 2024-03-12 RX ORDER — PREGABALIN 225 MG/1
1000 CAPSULE ORAL DAILY
Refills: 0 | Status: DISCONTINUED | OUTPATIENT
Start: 2024-03-12 | End: 2024-03-12

## 2024-03-12 RX ORDER — ATORVASTATIN CALCIUM 80 MG/1
80 TABLET, FILM COATED ORAL AT BEDTIME
Refills: 0 | Status: DISCONTINUED | OUTPATIENT
Start: 2024-03-12 | End: 2024-03-12

## 2024-03-12 RX ORDER — MORPHINE SULFATE 50 MG/1
2 CAPSULE, EXTENDED RELEASE ORAL ONCE
Refills: 0 | Status: DISCONTINUED | OUTPATIENT
Start: 2024-03-12 | End: 2024-03-12

## 2024-03-12 RX ORDER — PSYLLIUM SEED (WITH DEXTROSE)
2 POWDER (GRAM) ORAL
Refills: 0 | DISCHARGE

## 2024-03-12 RX ORDER — FUROSEMIDE 40 MG
2 TABLET ORAL
Refills: 0 | DISCHARGE

## 2024-03-12 RX ORDER — TIOTROPIUM BROMIDE 18 UG/1
2 CAPSULE ORAL; RESPIRATORY (INHALATION) DAILY
Refills: 0 | Status: DISCONTINUED | OUTPATIENT
Start: 2024-03-12 | End: 2024-03-12

## 2024-03-12 RX ORDER — HYDROMORPHONE HYDROCHLORIDE 2 MG/ML
1 INJECTION INTRAMUSCULAR; INTRAVENOUS; SUBCUTANEOUS
Qty: 100 | Refills: 0 | Status: DISCONTINUED | OUTPATIENT
Start: 2024-03-12 | End: 2024-03-12

## 2024-03-12 RX ORDER — VANCOMYCIN HCL 1 G
1750 VIAL (EA) INTRAVENOUS ONCE
Refills: 0 | Status: COMPLETED | OUTPATIENT
Start: 2024-03-12 | End: 2024-03-12

## 2024-03-12 RX ORDER — CLOPIDOGREL BISULFATE 75 MG/1
1 TABLET, FILM COATED ORAL
Refills: 0 | DISCHARGE

## 2024-03-12 RX ORDER — SPIRONOLACTONE 25 MG/1
2 TABLET, FILM COATED ORAL
Refills: 0 | DISCHARGE

## 2024-03-12 RX ORDER — DAPAGLIFLOZIN 10 MG/1
1 TABLET, FILM COATED ORAL
Refills: 0 | DISCHARGE

## 2024-03-12 RX ORDER — PANTOPRAZOLE SODIUM 20 MG/1
40 TABLET, DELAYED RELEASE ORAL DAILY
Refills: 0 | Status: DISCONTINUED | OUTPATIENT
Start: 2024-03-12 | End: 2024-03-12

## 2024-03-12 RX ADMIN — Medication 40 MILLIGRAM(S): at 11:28

## 2024-03-12 RX ADMIN — Medication 500 MILLIGRAM(S): at 09:06

## 2024-03-12 RX ADMIN — PIPERACILLIN AND TAZOBACTAM 200 GRAM(S): 4; .5 INJECTION, POWDER, LYOPHILIZED, FOR SOLUTION INTRAVENOUS at 10:29

## 2024-03-12 RX ADMIN — MORPHINE SULFATE 2 MILLIGRAM(S): 50 CAPSULE, EXTENDED RELEASE ORAL at 12:55

## 2024-03-12 RX ADMIN — Medication 1 MILLIGRAM(S): at 11:59

## 2024-03-12 RX ADMIN — HYDROMORPHONE HYDROCHLORIDE 2 MILLIGRAM(S): 2 INJECTION INTRAMUSCULAR; INTRAVENOUS; SUBCUTANEOUS at 14:33

## 2024-03-12 RX ADMIN — MORPHINE SULFATE 2 MILLIGRAM(S): 50 CAPSULE, EXTENDED RELEASE ORAL at 13:33

## 2024-03-12 RX ADMIN — Medication 40 MILLIGRAM(S): at 04:36

## 2024-03-12 NOTE — H&P ADULT - NSHPPHYSICALEXAM_GEN_ALL_CORE
ICU Vital Signs Last 24 Hrs  T(C): 36.4 (12 Mar 2024 06:19), Max: 36.6 (12 Mar 2024 04:30)  T(F): 97.5 (12 Mar 2024 06:19), Max: 97.9 (12 Mar 2024 04:30)  HR: 87 (12 Mar 2024 05:31) (84 - 93)  BP: 125/63 (12 Mar 2024 05:31) (110/47 - 125/63)  BP(mean): 80 (12 Mar 2024 05:31) (67 - 80)  ABP: --  ABP(mean): --  RR: 20 (12 Mar 2024 05:31) (20 - 22)  SpO2: 95% (12 Mar 2024 05:31) (88% - 97%)    O2 Parameters below as of 12 Mar 2024 05:31  Patient On (Oxygen Delivery Method): room air            PHYSICAL EXAM:    Constitutional: NAD, awake and alert  HEENT: PERR, EOMI, Normal Hearing, MMM  Neck: Soft and supple, No LAD, No JVD  Respiratory: Breath sounds are clear bilaterally, No wheezing, rales or rhonchi  Cardiovascular: S1 and S2, regular rate and rhythm, no Murmurs, gallops or rubs  Gastrointestinal: Bowel Sounds present, soft, nontender, nondistended, no guarding, no rebound  Extremities: No peripheral edema  Vascular: 2+ peripheral pulses  Neurological: A/O x 3, no focal deficits  Musculoskeletal: 5/5 strength b/l upper and lower extremities  Skin: No rashes

## 2024-03-12 NOTE — H&P ADULT - NSHPLABSRESULTS_GEN_ALL_CORE
CBC:            8.6    8.81  )-----------( 167      ( 03-12-24 @ 02:54 )             29.7         Chem:         ( 03-12-24 @ 02:54 )    139  |  99  |  20  ----------------------------<  113<H>  4.0   |  36<H>  |  0.92        Liver Functions: ( 03-12-24 @ 02:54 )  Alb: 3.0 g/dL / Pro: 6.8 gm/dL / ALK PHOS: 90 U/L / ALT: 12 U/L / AST: 9 U/L / GGT: x              Type & Screen:     < from: CT Chest No Cont (03.12.24 @ 05:45) >      IMPRESSION:  Worsening multifocal pneumonia since February 2024.    < end of copied text >

## 2024-03-12 NOTE — GOALS OF CARE CONVERSATION - ADVANCED CARE PLANNING - CONVERSATION DETAILS
Extensive GOC discussion had at bedside with HCP dtr, son in law, other dtr by phone; see GOC note for more details    Pt in near resp arrest  Sona was intubated, trached and on vent for months from [id number]; he has been on chronic oxygen since  He is s/p AKA    Decision made to focus on comfort, remove from NIV when 2nd dtr arrives ~ 1400 Extensive GOC discussion had at bedside with HCP dtr, son in law, other dtr by phone; see GOC note for more details    Pt in near resp arrest  Sona was intubated, trached and on vent for months from [id number]; he has been on chronic oxygen since  He is s/p AKA, poor fucntional status, on home oxygen    Family would like to respect his wishes and not pursue intubation and mechanical ventilation  Maintain DNR/DNI; order placed    They understand removal of bipap and comfort will likely result in his death within several hours    Decision made to focus on comfort, remove from NIV when 2nd dtr arrives ~ 1400

## 2024-03-12 NOTE — ED ADULT NURSE NOTE - NS ED NOTE  TALK SOMEONE YN
Mom states she continues to pump with her personal pump every 2-3 hours but not getting much. Reviewed discharge instructions and reassurance given. Encouraged to follow up with [facility] if she develops any needs or concerns. No

## 2024-03-12 NOTE — CHART NOTE - NSCHARTNOTEFT_GEN_A_CORE
Palliative SW & NP met with patient and family at bedside. Pt comes from [facility] and is known to team from previous admissions. MOLST on [employer] reflecting DNR/DNI with trial NIV. Pt is noticeably symptomatic. Family is at bedside and requesting inpatient hospice placement -- team agrees with referral and placement. If no beds at inpatient hospice, recommend making patient comfort measures here in hospital. ED MICHAEL Izaguirre working on referral. Emotional support provided. Our team will continue to follow.

## 2024-03-12 NOTE — ED ADULT NURSE REASSESSMENT NOTE - NS ED NURSE REASSESS COMMENT FT1
notified by RN that pt was in respiratory distress. pt placed on bipap RT and transferred to trauma room. pt placed on nitro drip for HTN and cardizem drip for tachycardia.  pt did not tolerate bipap well, continued with respiratory distress. pt DNR, DNI with molst. family at bedside. cardizem and nitro d/yael and placed on comfort care. pt removed from monitor. placed on morphine drip. awaiting ambulance for inpatient hospice.

## 2024-03-12 NOTE — ED ADULT NURSE REASSESSMENT NOTE - NS ED NURSE REASSESS COMMENT FT1
report received from Gabriel MUÑIZ. pt sleeping in bed comfortably with safety and comfort measures maintained. Respirations equal and unlabored.

## 2024-03-12 NOTE — CONSULT NOTE ADULT - SUBJECTIVE AND OBJECTIVE BOX
ICU Consult Note    HPI:    S:    Pt seen and examined  68yoM PMH PMHX of chronic AFIB s/p watchmans, hx of ETOH abuse, cirrhosis, chronic diastolic CHF, COPD, chronic hypoxic respiratory failure s/p trach and PEG now decannulated on 4L home O2, left BKA, CVA Nov 2023 on DAPT who presented to  with CC of SOB acute onset 2h PTA. No fever, chills, cough, NVD, abdominal pain, back pain, CP, urinary symptoms. Pt reports his RLE is always red and warm, no changes recently. Per EMS reports pt was given breathing treatment x3 and decadron PTA    Admitted for PNA, CHF    Pt condition deteriorated over a short period of time prior to getting to the floor, prompting an ICU consult    3/12: Pt in extremis; on NIV, RR ~ 40-50, HR HEVER 160-180, hypotensive, diaphoretic altered. Bipap in place.    ROS: Unable to obtain 2/2 Pt status    Allergies    Duricef (Rash)  Ceclor (Rash)    Intolerances    MEDICATIONS  (STANDING):  morphine  - Injectable 2 milliGRAM(s) IV Push once  morphine  Infusion 4 mG/Hr (4 mL/Hr) IV Continuous <Continuous>    MEDICATIONS  (PRN):    Drug Dosing Weight    Height (cm): 182.9 (12 Mar 2024 02:38)  Weight (kg): 113.4 (12 Mar 2024 02:38)  BMI (kg/m2): 33.9 (12 Mar 2024 02:38)  BSA (m2): 2.34 (12 Mar 2024 02:38)    PAST MEDICAL & SURGICAL HISTORY:    Chronic atrial fibrillation  Alcohol abuse  Poor historian  CHF (congestive heart failure)  Cirrhosis  Emphysema of lung  Alcohol withdrawal  Collapsed lung  Meningitis  Falls  Anemia  Chronic obstructive pulmonary disease (COPD)  GERD (gastroesophageal reflux disease)  Hypertension  Presence of Watchman left atrial appendage closure device  Atrial fibrillation  COPD without exacerbation  Chronic CHF  S/P BKA (below knee amputation) unilateral, left  Presence of Watchman left atrial appendage closure device  Unilateral amputation of lower extremity below knee  H/O tracheostomy  now removed  S/P percutaneous endoscopic gastrostomy (PEG) tube placement  now removed    FAMILY HISTORY:    No pertinent family history in first degree relatives    REVIEW OF SYSTEMS    UNLESS OTHERWISE NOTED IN HPI above:    Constitutional:  No Weight Change, No Fever, No Chills, No Night Sweats, No Fatigue, No Malaise  ENT/Mouth:  No Hearing Changes, No Ear Pain, No Nasal Congestion, No  Sinus Pain, No Hoarseness, No sore throat, No Rhinorrhea, No Swallowing  Difficulty  Eyes:  No Eye Pain, No Swelling, No Redness, No Foreign Body, No Discharge, No Vision Changes  Cardiovascular:  No Chest Pain, No SOB, No PND, No Dyspnea on Exertion,  No Orthopnea, No Claudication, No Edema, No Palpitations  Respiratory:  No Cough, No Sputum, No Wheezing, No Smoke Exposure, No Dyspnea  Gastrointestinal:  No Nausea, No Vomiting, No Diarrhea, No  Constipation, No Pain, No Heartburn, No Anorexia, No Dysphagia, No  Hematochezia, No Melena, No Flatulence, No Jaundice  Genitourinary:  No Dysmenorrhea, No DUB, No Dyspareunia, No Dysuria, No  Urinary Frequency, No Hematuria, No Urinary Incontinence, No Urgency,  No Flank Pain, No Urinary Flow Changes, No Hesitancy  Musculoskeletal:  No Arthralgias, No Myalgias, No Joint Swelling, No  Joint Stiffness, No Back Pain, No Neck Pain, No Injury History  Skin:  No Skin Lesions, No Pruritis, No Hair Changes, No Breast/Skin Changes, No Nipple Discharge  Neuro:  No Weakness, No Numbness, No Paresthesias, No Loss of  Consciousness, No Syncope, No Dizziness, No Headache, No Coordination  Changes, No Recent Falls  Psych:  No Anxiety/Panic, No Depression, No Insomnia, No Personality  Changes, No Delusions, No Rumination, No SI/HI/AH/VH, No Social Issues,  No Memory Changes, No Violence/Abuse Hx., No Eating Concerns  Heme/Lymph:  No Bruising, No Bleeding, No Transfusions History, No Lymphadenopathy  Endocrine:  No Polyuria, No Polydipsia, No Temperature Intolerance    O:    ICU Vital Signs Last 24 Hrs  T(C): 36.4 (12 Mar 2024 06:19), Max: 36.6 (12 Mar 2024 04:30)  T(F): 97.5 (12 Mar 2024 06:19), Max: 97.9 (12 Mar 2024 04:30)  HR: 190 (12 Mar 2024 12:27) (84 - 196)  BP: 134/120 (12 Mar 2024 12:27) (99/85 - 147/125)  BP(mean): 134 (12 Mar 2024 11:57) (67 - 134)  ABP: --  ABP(mean): --  RR: 20 (12 Mar 2024 11:00) (20 - 22)  SpO2: 99% (12 Mar 2024 11:00) (88% - 99%)    O2 Parameters below as of 12 Mar 2024 11:00  Patient On (Oxygen Delivery Method): nasal cannula  O2 Flow (L/min): 3    I&O's Detail    11 Mar 2024 07:01  -  12 Mar 2024 07:00  --------------------------------------------------------  IN:  Total IN: 0 mL    OUT:    Voided (mL): 1200 mL  Total OUT: 1200 mL    Total NET: -1200 mL    PE:    Adult M lying in bed  In extremis with marked resp failure, tachypnea, diaphoresis  S1S2+ irregular rate 160-180  Scattered BS B/L  Abd soft  s/p L AKA  Diffuse bruising throughout, diaphoretic    LABS:    CBC Full  -  ( 12 Mar 2024 02:54 )  WBC Count : 8.81 K/uL  RBC Count : 3.51 M/uL  Hemoglobin : 8.6 g/dL  Hematocrit : 29.7 %  Platelet Count - Automated : 167 K/uL  Mean Cell Volume : 84.6 fl  Mean Cell Hemoglobin : 24.5 pg  Mean Cell Hemoglobin Concentration : 29.0 gm/dL  Auto Neutrophil # : 6.37 K/uL  Auto Lymphocyte # : 1.69 K/uL  Auto Monocyte # : 0.67 K/uL  Auto Eosinophil # : 0.02 K/uL  Auto Basophil # : 0.02 K/uL  Auto Neutrophil % : 72.3 %  Auto Lymphocyte % : 19.2 %  Auto Monocyte % : 7.6 %  Auto Eosinophil % : 0.2 %  Auto Basophil % : 0.2 %    03-12    139  |  99  |  20  ----------------------------<  113<H>  4.0   |  36<H>  |  0.92    Ca    8.5      12 Mar 2024 02:54    TPro  6.8  /  Alb  3.0<L>  /  TBili  0.3  /  DBili  x   /  AST  9<L>  /  ALT  12  /  AlkPhos  90  03-12      Urinalysis Basic - ( 12 Mar 2024 02:54 )    Color: x / Appearance: x / SG: x / pH: x  Gluc: 113 mg/dL / Ketone: x  / Bili: x / Urobili: x   Blood: x / Protein: x / Nitrite: x   Leuk Esterase: x / RBC: x / WBC x   Sq Epi: x / Non Sq Epi: x / Bacteria: x      CAPILLARY BLOOD GLUCOSE            LIVER FUNCTIONS - ( 12 Mar 2024 02:54 )  Alb: 3.0 g/dL / Pro: 6.8 gm/dL / ALK PHOS: 90 U/L / ALT: 12 U/L / AST: 9 U/L / GGT: x

## 2024-03-12 NOTE — ED ADULT NURSE NOTE - NSFALLRISKINTERV_ED_ALL_ED

## 2024-03-12 NOTE — ED ADULT NURSE REASSESSMENT NOTE - NS ED NURSE REASSESS COMMENT FT1
Pt sleepy and arousable to voice, plan of care discussed, pt provided urinal to void. pt has no other concerns at this time. pt states that he is feeling better but also feels like he has heavy phlegm stuck. MD. Lorenzo nicholas.

## 2024-03-12 NOTE — ED ADULT NURSE NOTE - OBJECTIVE STATEMENT
Pt presents to ED w c/o SOB 2hr PTA, pt states that he was resting when he began to feel SOB. PMH COPD, has had COPD exacerbations in the past. Uses 4LNC at home. Denies CP, HA, fever, chills, vomiting. medications administered by EMS in transit to [facility]. Labs obtained, pt has no other concerns at this time.    Pt A&O4 w clear speech and follows commands, impaired gait d/t prosthetic utilized to LLE. pt placed on 4L O2 sat 94%. Pt tachypneic at time of assessment.

## 2024-03-12 NOTE — ED ADULT TRIAGE NOTE - CHIEF COMPLAINT QUOTE
Pt presents to [facility] complaining of SOB x 1 hr . Pt has hx of COPD on 4 L NC baseline. Pt ids A &O x4. Labored breathing noted in triage.  called to bedside. Pt brought tro trauma room. Pt given 3 duonebs and 10 IM dexamethasone. Pt is DNR/DNI

## 2024-03-12 NOTE — ED PROVIDER NOTE - OBJECTIVE STATEMENT
68yoM PMH PMHX of chronic AFIB s/p watchmans, hx of ETOH abuse, cirrhosis, chronic diastolic CHF, COPD, chronic hypoxic respiratory failure s/p trach and PEG now decannulated on 4L home O2, left BKA, CVA Nov 2023 on DAPT who presented to  with CC of SOB acute onset 2h PTA. No fever, chills, cough, NVD, abdominal pain, back pain, CP, urinary symptoms. Pt reports his RLE is always red and warm, no changes recently. Per EMS reports pt was given breathing treatment x3 and decadron PTA.

## 2024-03-12 NOTE — CONSULT NOTE ADULT - NS PANP COMMENT GEN_ALL_CORE FT
68yoM PMH PMHX of chronic AFIB s/p watchmans, hx of ETOH abuse, cirrhosis, chronic diastolic CHF, COPD, chronic hypoxic respiratory failure s/p trach and PEG now decannulated on 4L home O2, left BKA, CVA Nov 2023 on DAPT who presented to  for worsening shortness of breath.  LIkely in acute hypoxic respiratory failure in the setting of pulm edema, cannot rule pneumonia.  Patient has multiple hospitalizations in the past few months, and after extensive GOC with family by PA, and confirmed by me, both daughters in agreement with transitioning to comfort measure.  Started morphine drip, ordered dilaudid push PRN.  Palliative care consulted, does not need ICU at this time.  Please call for any other questions.

## 2024-03-12 NOTE — GOALS OF CARE CONVERSATION - ADVANCED CARE PLANNING - PARTICIPANTS
Nasal saline. 2. Humidifier, push fluids, rest, acetaminophen as needed q 4-6 hours for fever and myalgias.  Watch glucose level am and pm and follow up with Endocrinology if blood sugar is elevated above normal levels.     Family

## 2024-03-12 NOTE — CONSULT NOTE ADULT - ASSESSMENT
A:    68M    Here for:    1. Acute hypoxic resp failure 2/2  2. PNA  3. Severe sepsis POA  4. Acute pulmonary edema  5. Chronic resp failure  6. HEVER    This patient requires critical care for support of one or more vital organ systems with a high probability of imminent or life threatening deterioration in his/her condition    P:    I arrived to find Pt in extremis; marked resp failure on NIV, RR 40-50, altered, diaphoretic, HEVER -180    He is a self signed DNR/I    Extensive GOC discussion had at bedside with HCP dtr, son in law, other dtr by phone; see GOC note for more details    In summary, want to honor his previously signed wishes and maintain DNR/I    Sona is finding NIV mask extremely uncomfortable    Discussed comfort measures    Decision made to focus on comfort, remove from NIV when 2nd dtr arrives ~ 1400    Discussed with Dr Smith  Discussed with Aleena Barkley (admitting hospitalist),         Dispo: Cont critical care.    Date of entry of this note is equal to the date of services rendered    TOTAL CRITICAL CARE TIME:   (EXCLUSIVE of any non bundled procedures)    Note: This is a critically ill patient. Time spent has been in salvage of life, limb and vital organ systems. This time INCLUDES time spent directly as this patient's bedside with evaluation and management, review of chart including review of laboratory and imaging studies, interpretation of vital signs and cardiac output measurements, any necessary ventilator management, and time spent discussing plan of care with patient and family, including goals of care discussion. This also includes time spent in multidisciplinary discussion with care team and various consultants to optimize treatment plan. This time may NOT include various procedures, which will be noted seperately.    A:    68M    Here for:    1. Acute hypoxic resp failure 2/2  2. PNA  3. Severe sepsis POA  4. Acute pulmonary edema  5. Chronic resp failure  6. HEVER    This patient requires critical care for support of one or more vital organ systems with a high probability of imminent or life threatening deterioration in his/her condition    P:    I arrived to find Pt in extremis; marked resp failure on NIV, RR 40-50, altered, diaphoretic, HEVER -180    He is a self signed DNR/I    Extensive GOC discussion had at bedside with HCP dtr, son in law, other dtr by phone; see GOC note for more details    In summary, want to honor his previously signed wishes and maintain DNR/I    Sona is finding NIV mask extremely uncomfortable    Discussed comfort measures    Decision made to focus on comfort, remove from NIV when 2nd dtr arrives ~ 1400    Discussed with Dr Smith  Discussed with Aleena Barkley (admitting hospitalist), Dr Yu (ER physician)    Dispo: Comfort measures. Palliative called by ER. No ICU admit/svc needed.    Date of entry of this note is equal to the date of services rendered    TOTAL CRITICAL CARE TIME: 120 minutes  (EXCLUSIVE of any non bundled procedures)    Note: This is a critically ill patient. Time spent has been in salvage of life, limb and vital organ systems. This time INCLUDES time spent directly as this patient's bedside with evaluation and management, review of chart including review of laboratory and imaging studies, interpretation of vital signs and cardiac output measurements, any necessary ventilator management, and time spent discussing plan of care with patient and family, including goals of care discussion. This also includes time spent in multidisciplinary discussion with care team and various consultants to optimize treatment plan. This time may NOT include various procedures, which will be noted seperately.

## 2024-03-12 NOTE — PHARMACOTHERAPY INTERVENTION NOTE - COMMENTS
Vancomycin dose adjusted according to weight based first dose policy
Medication history complete, patient is from [facility], reviewed and confirmed medication with list provided by facility.

## 2024-03-12 NOTE — H&P ADULT - HISTORY OF PRESENT ILLNESS
"68yoM PMH PMHX of chronic AFIB s/p watchmans, hx of ETOH abuse, cirrhosis, chronic diastolic CHF, COPD, chronic hypoxic respiratory failure s/p trach and PEG now decannulated on 4L home O2, left BKA, CVA Nov 2023 on DAPT who presented to  with CC of SOB acute onset 2h PTA. No fever, chills, cough, NVD, abdominal pain, back pain, CP, urinary symptoms. Pt reports his RLE is always red and warm, no changes recently. Per EMS reports pt was given breathing treatment x3 and decadron PTA."

## 2024-03-12 NOTE — ED PROVIDER NOTE - PATIENT PORTAL LINK FT
You can access the FollowMyHealth Patient Portal offered by [facility] by registering at the following website: [email or web address]. By joining [employer]’s FollowMyHealth portal, you will also be able to view your health information using other applications (apps) compatible with our system.

## 2024-03-12 NOTE — ED PROVIDER NOTE - PROGRESS NOTE DETAILS
Dr. Ventura ED attending signed out to Dr. Yu pending CT imaging Isrrael Lopez for attending Dr. Yu: Pt acutely SOB, hypertensive, tachycardiac to 140s and in rapid Afib. Pt has b/l rales on exam and working hard to breath. BiPAP started, 40mg Lasix and 3 duonebs given. Nitro drip started. Pt monitored closely. Repeat XR done. Unchanged from several hours ago. Will continue nitro drip and BiPAP, repeat ABG, basic labs. 12 lead repeated showing rapid Afib. No ischemic changes. Due to pt's appearance and work of breathing discussed possible need  for intubation. Pt agreeable to intubation if needed. Of note, no diaphoresis or cyanosis. Pt appears anxious. Will give Ativan. Multiple attempts at resuscitation for acute shortness of breath. suspect flash pulm edema as primary differential dx. 12 lead repeated - no STEMI. pt hypoxic, HTN, hx HF, sudden onset fits with pulmonary edema. Nitro drip attempted - no improvement in SOB and often drastically lowered pt BP with doses of 100mcgs/min. Duonebs and repeat lasix both trailed. Cardizem drip trialed. Patient remains tachycardic with laibile blood pressure. ICU Consutled. tachycardia worsened and resp distress worsened despite BIPAP and all of the above treatments.  Patient MOLST reviewed - DNR/DNI. Family called. They came to bedside. Extensive discussion has with family ICU teatm regarding ongoing care. Family decided to stick with MOLST form and keep pt DNR/DNI and focus on comfort care. Morphine ordered for pain. SW found spot at outpatient hospice for pt. Patient taken off bipap and placed on NRB for comfort. unsure when patient will eventualy exipire- there is a chance it may happen en-route to the Hospice facility. Family understands - prefers him to be there which is a more quiet and peaceful setting then ER which is current at full capacity. Isrrael Lopez for attending Dr. Yu: Pt acutely SOB, hypertensive, tachycardiac to 140s and in rapid Afib. Pt has b/l rales on exam and working hard to breath. BiPAP started, 40mg Lasix and 3 duonebs given. Nitro drip started. Pt monitored closely. Repeat XR done. Unchanged from several hours ago. Will continue nitro drip and BiPAP, repeat ABG, basic labs. 12 lead repeated showing rapid Afib. No ischemic changes. Due to pt's appearance and work of breathing discussed possible need  for intubation.  unclear if patient has decision making capacity at this time. Of note, no diaphoresis or cyanosis. Pt appears anxious. Will give Ativan.

## 2024-03-12 NOTE — ED PROVIDER NOTE - CLINICAL SUMMARY MEDICAL DECISION MAKING FREE TEXT BOX
EKG, CXR, labs including BCX, Duplex US RLE, monitor and reassess EKG, CXR, labs including BCX, Duplex US RLE, monitor and reassess. Duplex US negative for DVT. Labs show no leukocytosis, troponin WNL, elevated BNP concerning for CHF so 40mg IV lasix ordered, VBG shows no acidosis. CXR shows b/l lower infiltrate concerning for PNA vs CHF. CT chest ordered to distinguish. Pt is 95% on 3L NC.

## 2024-03-12 NOTE — CONSULT NOTE ADULT - ASSESSMENT
Pt is a 68y old Male with hx of chronic AFIB s/p watchmans, hx of ETOH abuse, cirrhosis, chronic diastolic CHF, COPD, chronic hypoxic respiratory failure s/p trach and PEG now decannulated on 4L home O2, left BKA, CVA Nov 2023 on DAPT who presented to  with CC of SOB acute onset 2h PTA. No fever, chills, cough, NVD, abdominal pain, back pain, CP, urinary symptoms. Pt reports his RLE is always red and warm, no changes recently. Per EMS reports pt was given breathing treatment x3 and decadron PTA.    1. AHRF 2/2 PNA, CHF  -given IV lasix 40 mg x 2 in ED  -vanc and zosyn given  -placed on BIPAP  -decompensated over short period of time prompting ICU consult  -per GO discussion family opted for comfort measures to respect father's wishes of DNR/DNI  -referral to inpatient hospice  -MOLST with DNR/DNI    2. Comfort measures  -on morphine gtt 4mg/hr  -Hydromorphone 2 mg IV stat for severe resp distress  -IV Hydromorphone 1 mg every 1 hour PRN  -IV Ativan 0.5 mg every 4 hours PRN anxiety/ agitation  -IV Glycopyrrolate 0.2 mg every 6 hours PRN excess secretions      Process of Care   --Reviewed dx/treatment problems and alignment with Goals of Care     Physical Aspects of Care   --Pain   patient denies at this time   c/w current management     --Bowel Regimen   denies constipation   risk for constipation d/t immobility   daily dulcolax as needed     --Dyspnea   ++significant respiratory distress  tachypneic, increased WOB  per fam discussion comfort measures only to respect patient's wishes of DNR/DNI  Morphine gtt  IV Hydromorphone 1 mg every 1 hour PRN    --Nausea Vomiting   denies     --Anxiety/ Agitation  IV Lorazepam 0.5 mg every 4 hours PRN        Psychological and Psychiatric Aspects of Care:    --Grief/ Bereavement: emotional support provided   --Hx of psychiatric dx: none   --Pastoral Care Available PRN          Social Aspects of Care   --SW involved            Cultural Aspects   --Primary Language: English           Goals of Care:  per GOC, patient made comfort measures only. referral to inpatient hospice.          We discussed Palliative Care team being a supportive team when a patient has ongoing illnesses.  We also discussed that it is not an end of life care service, but can help navigate symptoms and emotional support throughout their hospital stay here.           Ethical and Legal Aspects: NA           Capacity- drowsy, does not have decision making capacity at this time         HCP/Surrogate:  Litzy Boone, daughter [phone]          Code Status: DNR/DNI    MOLST: MOLST from 11/20/23 with limitations of DNR/DNI trial NIV    Dispo Plan: referral to inpatient hospice          Discussed With: Dr. Workman coordinated with attending and SW and RN            Time Spent: 75 minutes including the care, coordination and counseling of this patient, 50% of which was spent coordinating and counseling.

## 2024-03-12 NOTE — ED ADULT NURSE NOTE - CHIEF COMPLAINT QUOTE
Pt presents to [facility] complaining of SOB x 1 hr . Pt has hx of COPD on 4 L NC baseline. Pt ids A &O x4. Labored breathing noted in triage.  called to bedside. Pt brought tro trauma room. Pt given 3 duonebs and 10 IM dexamethasone. Pt is DNR/DNI

## 2024-03-12 NOTE — ED PROVIDER NOTE - PHYSICAL EXAMINATION
Constitutional: tired appearing with increased WOB on NC, NAD AAOx3  Eyes: EOMI, PERRL  Head: Normocephalic atraumatic  Mouth: no airway obstruction, posterior oropharynx clear without erythema or exudate  Neck: supple  Cardiac: regular rate and rhythm, no MRG  Resp: increased WOB, diminished breath sounds bilaterally, no wheezing or crackles  GI: Abd s/nt/nd  Neuro: CN2-12 intact, strength 5/5x4, sensation grossly intact  Skin: RLE shin red and warm without crepitus, induration or fluctuance Constitutional: tired appearing with increased WOB on NC, NAD AAOx3  Eyes: EOMI, PERRL  Head: Normocephalic atraumatic  Mouth: no airway obstruction, posterior oropharynx clear without erythema or exudate  Neck: supple  Cardiac: regular rate and rhythm, no MRG  Resp: increased WOB, diminished breath sounds bilaterally, no wheezing or crackles  GI: Abd s/nt/nd  Neuro: CN2-12 intact, strength 5/5x4, sensation grossly intact  Skin: RLE shin red without warmth, crepitus, induration or fluctuance

## 2024-03-12 NOTE — CONSULT NOTE ADULT - SUBJECTIVE AND OBJECTIVE BOX
HPI: Pt is a 68y old Male with hx of chronic AFIB s/p watchmans, hx of ETOH abuse, cirrhosis, chronic diastolic CHF, COPD, chronic hypoxic respiratory failure s/p trach and PEG now decannulated on 4L home O2, left BKA, CVA Nov 2023 on DAPT who presented to  with CC of SOB acute onset 2h PTA. No fever, chills, cough, NVD, abdominal pain, back pain, CP, urinary symptoms. Pt reports his RLE is always red and warm, no changes recently. Per EMS reports pt was given breathing treatment x3 and decadron PTA.    3/12: Seen in ED. In respiratory distress - pulled off NRB. Morphine gtt just initiated. RN to give PRN dose of Dilaudid. Children at bedside - referral being sent for inpatient hospice, they are in agreement.       PAIN: ( )Yes   (X)No  denies    DYSPNEA: (X) Yes  ( ) No  ++significant dyspnea at rest  pulled off O2  placed on NC  Morphine gtt just initiated  IV Hydromorphone 1 mg every 1 hours PRN resp distress  IV Lorazepam 0.5 mg every 4 hours PRN anxiety/ agitation  IV Glycopyrrolate 0.2 mg every 6 hours PRN excess secretions      PAST MEDICAL & SURGICAL HISTORY:  Chronic atrial fibrillation  Alcohol abuse  Poor historian  CHF (congestive heart failure)  Cirrhosis  Emphysema of lung  Alcohol withdrawal  Collapsed lung  Meningitis  Falls  Anemia  Chronic obstructive pulmonary disease (COPD)  GERD (gastroesophageal reflux disease)  Hypertension  Presence of Watchman left atrial appendage closure device  Atrial fibrillation  COPD without exacerbation  Chronic CHF  S/P BKA (below knee amputation) unilateral, left  Presence of Watchman left atrial appendage closure device  Unilateral amputation of lower extremity below knee  H/O tracheostomy  now removed  S/P percutaneous endoscopic gastrostomy (PEG) tube placement  now removed      SOCIAL HX:    Hx opiate tolerance ( )YES  ( )NO    Baseline ADLs  (Prior to Admission)  ( ) Independent   (X)Dependent    FAMILY HISTORY:  No pertinent family history in first degree relatives        Review of Systems:    Unable to obtain/Limited due to: respiratory distress       PHYSICAL EXAM:    Vital Signs Last 24 Hrs  T(C): 36.4 (12 Mar 2024 06:19), Max: 36.6 (12 Mar 2024 04:30)  T(F): 97.5 (12 Mar 2024 06:19), Max: 97.9 (12 Mar 2024 04:30)  HR: 148 (12 Mar 2024 12:45) (84 - 196)  BP: 74/57 (12 Mar 2024 12:45) (74/57 - 177/122)  BP(mean): 63 (12 Mar 2024 12:45) (63 - 137)  RR: 20 (12 Mar 2024 11:00) (20 - 22)  SpO2: 99% (12 Mar 2024 11:00) (88% - 99%)    Parameters below as of 12 Mar 2024 11:00  Patient On (Oxygen Delivery Method): nasal cannula  O2 Flow (L/min): 3    Daily Height in cm: 182.88 (12 Mar 2024 02:38)    Daily     PPSV2:   10%  FAST:    General: drowsy, pale, in distress  Mental Status: SAM  HEENT: dry oral mucosa  Lungs: coarse b/l, tachypneic, increased WOB  Cardiac: irregular rate and rhythm  GI: protuberant abdomen  : no suprapubic tenderness  Ext: +2 b/l JAX  Neuro: Drowsy, limited exam d/t clinical condition      LABS:                        8.6    8.81  )-----------( 167      ( 12 Mar 2024 02:54 )             29.7     03-12    139  |  99  |  20  ----------------------------<  113<H>  4.0   |  36<H>  |  0.92    Ca    8.5      12 Mar 2024 02:54    TPro  6.8  /  Alb  3.0<L>  /  TBili  0.3  /  DBili  x   /  AST  9<L>  /  ALT  12  /  AlkPhos  90  03-12      Albumin: Albumin: 3.0 g/dL (03-12 @ 02:54)      Allergies    Duricef (Rash)  Ceclor (Rash)    Intolerances      MEDICATIONS  (STANDING):  morphine  Infusion 4 mG/Hr (4 mL/Hr) IV Continuous <Continuous>    MEDICATIONS  (PRN):  glycopyrrolate Injectable 0.2 milliGRAM(s) IV Push every 6 hours PRN excess secretions  HYDROmorphone  Injectable 1 milliGRAM(s) IV Push every 1 hour PRN Moderate Pain (4 - 6)  LORazepam   Injectable 0.5 milliGRAM(s) IV Push every 4 hours PRN anxiety or agitation      RADIOLOGY/ADDITIONAL STUDIES:  < from: Xray Chest 1 View- PORTABLE-Urgent (03.12.24 @ 03:17) >    ACC: [id number] EXAM:  XR CHEST PORTABLE URGENT 1V   ORDERED BY: SARAHY HATCH     PROCEDURE DATE:  03/12/2024          INTERPRETATION:  EXAM: XR CHEST URGENT    INDICATION: Chest Pain JXR    COMPARISON: February 26, 2024    IMPRESSION: Increasedperihilar interstitial markings more prominent on   the right especially in the right infrahilar region. Inflammatory   infectious process should be considered especially in the right   infrahilar region and behind the heart. An underlying congestivechanges   should also be considered. Cardiomegaly noted. Regional osseous   structures unchanged.  No pneumothorax    --- End of Report ---            SHAWN BLACKMON MD; Attending Radiologist  This document has been electronically signed. Mar 12 [id number]:20AM    < end of copied text >  < from: CT Chest No Cont (03.12.24 @ 05:45) >    ACC: [id number] EXAM:  CT CHEST   ORDERED BY: SARAHY HATCH     PROCEDURE DATE:  03/12/2024          INTERPRETATION:  CLINICAL INFORMATION: Shortness of breath    COMPARISON: 2/1/2024    CONTRAST/COMPLICATIONS:  IV Contrast: NONE  Oral Contrast: NONE  Complications: None reported at time of study completion    PROCEDURE:  CT of the Chest was performed.  Sagittal and coronal reformats were performed.    FINDINGS:    LUNGS, AIRWAYS AND PLEURA: The central airways are patent. No pleural   effusion. Patchy airspace consolidations noted bilaterally has increased   in severity compared to the prior study dated 2/1/2024, particularly in   the right middle and lower lobes. Bilateral lower lobe subsegmental   atelectasis. Scattered calcified granulomas. Mild peripheral reticulation   is present without traction bronchiectasis or honeycombing.    PLEURA: No pleural effusion.    MEDIASTINUM AND ZAC: Stable noncalcified and partially calcified   mediastinal and hilar lymph nodes measuring up to 1.3 cm in short axis    VESSELS: Enlargement pulmonary artery measuring up to 5 cm, likely due to   pulmonary hypertension. 4.2 cm mid ascending aorta.    HEART: Watchman device. Mild cardiomegaly. No pericardial effusion.   Coronary artery calcification.    CHEST WALL AND LOWER NECK: Unchanged 1.7 cm right thyroid lobe hypodense   nodule (2-13). Symmetric mild gynecomastia.    VISUALIZED UPPER ABDOMEN: Unchanged splenic cyst and scattered calcified   splenic granulomas.    BONES: Chronic rib fractures.    IMPRESSION:  Worsening multifocal pneumonia since February 2024.    --- End of Report ---            LINK ROMERO MD; Attending Radiologist  This document has been electronically signed. Mar 12 2024  6:04AM    < end of copied text >    < from: US Duplex Venous Lower Ext Ltd, Right (03.12.24 @ 04:20) >    ACC: [id number] EXAM:  US DPLX LWR EXT VEINS LTD RT   ORDERED BY: SARAHY HATCH     PROCEDURE DATE:  03/12/2024          INTERPRETATION:  CLINICAL INFORMATION: Swollen and tender right lower   extremity.    COMPARISON: None available.    TECHNIQUE: Duplex sonography of the RIGHT LOWER extremity veins with   color and spectral Doppler, with and without compression.    FINDINGS:    There is normal compressibility of the right common femoral, femoral and   popliteal veins.  The contralateral common femoral vein is patent.  Posterior tibial  and gastrocnemius veins are patent. Peroneal and soleal   veins are not visualized.    IMPRESSION:  No evidence of right lower extremity deep venous thrombosis.            --- End of Report ---            JABIER PARKER MD; Attending Radiologist  This document has been electronically signed. Mar 12 2024  4:29AM    < end of copied text >   HPI: Pt is a 68y old Male with hx of chronic AFIB s/p watchmans, hx of ETOH abuse, cirrhosis, chronic diastolic CHF, COPD, chronic hypoxic respiratory failure s/p trach and PEG now decannulated on 4L home O2, left BKA, CVA Nov 2023 on DAPT who presented to  with CC of SOB acute onset 2h PTA. No fever, chills, cough, NVD, abdominal pain, back pain, CP, urinary symptoms. Pt reports his RLE is always red and warm, no changes recently. Per EMS reports pt was given breathing treatment x3 and decadron PTA.    3/12: Seen in ED. In respiratory distress - pulled off NRB. Morphine gtt just initiated. RN to give PRN dose of Dilaudid. Children at bedside - referral being sent for inpatient hospice, they are in agreement.   seen later this afternoon - much more comfortable, pending transfer to inpatient hospice.      PAIN: ( )Yes   (X)No  denies    DYSPNEA: (X) Yes  ( ) No  ++significant dyspnea at rest  pulled off O2  placed on NC  Morphine gtt just initiated  IV Hydromorphone 1 mg every 1 hours PRN resp distress  IV Lorazepam 0.5 mg every 4 hours PRN anxiety/ agitation  IV Glycopyrrolate 0.2 mg every 6 hours PRN excess secretions      PAST MEDICAL & SURGICAL HISTORY:  Chronic atrial fibrillation  Alcohol abuse  Poor historian  CHF (congestive heart failure)  Cirrhosis  Emphysema of lung  Alcohol withdrawal  Collapsed lung  Meningitis  Falls  Anemia  Chronic obstructive pulmonary disease (COPD)  GERD (gastroesophageal reflux disease)  Hypertension  Presence of Watchman left atrial appendage closure device  Atrial fibrillation  COPD without exacerbation  Chronic CHF  S/P BKA (below knee amputation) unilateral, left  Presence of Watchman left atrial appendage closure device  Unilateral amputation of lower extremity below knee  H/O tracheostomy  now removed  S/P percutaneous endoscopic gastrostomy (PEG) tube placement  now removed      SOCIAL HX:    Hx opiate tolerance ( )YES  ( )NO    Baseline ADLs  (Prior to Admission)  ( ) Independent   (X)Dependent    FAMILY HISTORY:  No pertinent family history in first degree relatives        Review of Systems:    Unable to obtain/Limited due to: respiratory distress       PHYSICAL EXAM:    Vital Signs Last 24 Hrs  T(C): 36.4 (12 Mar 2024 06:19), Max: 36.6 (12 Mar 2024 04:30)  T(F): 97.5 (12 Mar 2024 06:19), Max: 97.9 (12 Mar 2024 04:30)  HR: 148 (12 Mar 2024 12:45) (84 - 196)  BP: 74/57 (12 Mar 2024 12:45) (74/57 - 177/122)  BP(mean): 63 (12 Mar 2024 12:45) (63 - 137)  RR: 20 (12 Mar 2024 11:00) (20 - 22)  SpO2: 99% (12 Mar 2024 11:00) (88% - 99%)    Parameters below as of 12 Mar 2024 11:00  Patient On (Oxygen Delivery Method): nasal cannula  O2 Flow (L/min): 3    Daily Height in cm: 182.88 (12 Mar 2024 02:38)    Daily     PPSV2:   10%  FAST:    General: drowsy, pale, in distress  Mental Status: SAM  HEENT: dry oral mucosa  Lungs: coarse b/l, tachypneic, increased WOB  Cardiac: irregular rate and rhythm  GI: protuberant abdomen  : no suprapubic tenderness  Ext: +2 b/l JAX  Neuro: Drowsy, limited exam d/t clinical condition      LABS:                        8.6    8.81  )-----------( 167      ( 12 Mar 2024 02:54 )             29.7     03-12    139  |  99  |  20  ----------------------------<  113<H>  4.0   |  36<H>  |  0.92    Ca    8.5      12 Mar 2024 02:54    TPro  6.8  /  Alb  3.0<L>  /  TBili  0.3  /  DBili  x   /  AST  9<L>  /  ALT  12  /  AlkPhos  90  03-12      Albumin: Albumin: 3.0 g/dL (03-12 @ 02:54)      Allergies    Duricef (Rash)  Ceclor (Rash)    Intolerances      MEDICATIONS  (STANDING):  morphine  Infusion 4 mG/Hr (4 mL/Hr) IV Continuous <Continuous>    MEDICATIONS  (PRN):  glycopyrrolate Injectable 0.2 milliGRAM(s) IV Push every 6 hours PRN excess secretions  HYDROmorphone  Injectable 1 milliGRAM(s) IV Push every 1 hour PRN Moderate Pain (4 - 6)  LORazepam   Injectable 0.5 milliGRAM(s) IV Push every 4 hours PRN anxiety or agitation      RADIOLOGY/ADDITIONAL STUDIES:  < from: Xray Chest 1 View- PORTABLE-Urgent (03.12.24 @ 03:17) >    ACC: [id number] EXAM:  XR CHEST PORTABLE URGENT 1V   ORDERED BY: SARAHY HATCH     PROCEDURE DATE:  03/12/2024          INTERPRETATION:  EXAM: XR CHEST URGENT    INDICATION: Chest Pain JXR    COMPARISON: February 26, 2024    IMPRESSION: Increasedperihilar interstitial markings more prominent on   the right especially in the right infrahilar region. Inflammatory   infectious process should be considered especially in the right   infrahilar region and behind the heart. An underlying congestivechanges   should also be considered. Cardiomegaly noted. Regional osseous   structures unchanged.  No pneumothorax    --- End of Report ---            SHAWN BLACKMON MD; Attending Radiologist  This document has been electronically signed. Mar 12 [id number]:20AM    < end of copied text >  < from: CT Chest No Cont (03.12.24 @ 05:45) >    ACC: [id number] EXAM:  CT CHEST   ORDERED BY: SARAHY HATCH     PROCEDURE DATE:  03/12/2024          INTERPRETATION:  CLINICAL INFORMATION: Shortness of breath    COMPARISON: 2/1/2024    CONTRAST/COMPLICATIONS:  IV Contrast: NONE  Oral Contrast: NONE  Complications: None reported at time of study completion    PROCEDURE:  CT of the Chest was performed.  Sagittal and coronal reformats were performed.    FINDINGS:    LUNGS, AIRWAYS AND PLEURA: The central airways are patent. No pleural   effusion. Patchy airspace consolidations noted bilaterally has increased   in severity compared to the prior study dated 2/1/2024, particularly in   the right middle and lower lobes. Bilateral lower lobe subsegmental   atelectasis. Scattered calcified granulomas. Mild peripheral reticulation   is present without traction bronchiectasis or honeycombing.    PLEURA: No pleural effusion.    MEDIASTINUM AND ZAC: Stable noncalcified and partially calcified   mediastinal and hilar lymph nodes measuring up to 1.3 cm in short axis    VESSELS: Enlargement pulmonary artery measuring up to 5 cm, likely due to   pulmonary hypertension. 4.2 cm mid ascending aorta.    HEART: Watchman device. Mild cardiomegaly. No pericardial effusion.   Coronary artery calcification.    CHEST WALL AND LOWER NECK: Unchanged 1.7 cm right thyroid lobe hypodense   nodule (2-13). Symmetric mild gynecomastia.    VISUALIZED UPPER ABDOMEN: Unchanged splenic cyst and scattered calcified   splenic granulomas.    BONES: Chronic rib fractures.    IMPRESSION:  Worsening multifocal pneumonia since February 2024.    --- End of Report ---            LINK ROMERO MD; Attending Radiologist  This document has been electronically signed. Mar 12 2024  6:04AM    < end of copied text >    < from: US Duplex Venous Lower Ext Ltd, Right (03.12.24 @ 04:20) >    ACC: [id number] EXAM:  US DPLX LWR EXT VEINS LTD RT   ORDERED BY: SARAHY HATCH     PROCEDURE DATE:  03/12/2024          INTERPRETATION:  CLINICAL INFORMATION: Swollen and tender right lower   extremity.    COMPARISON: None available.    TECHNIQUE: Duplex sonography of the RIGHT LOWER extremity veins with   color and spectral Doppler, with and without compression.    FINDINGS:    There is normal compressibility of the right common femoral, femoral and   popliteal veins.  The contralateral common femoral vein is patent.  Posterior tibial  and gastrocnemius veins are patent. Peroneal and soleal   veins are not visualized.    IMPRESSION:  No evidence of right lower extremity deep venous thrombosis.            --- End of Report ---            JABIER PARKER MD; Attending Radiologist  This document has been electronically signed. Mar 12 2024  4:29AM    < end of copied text >

## 2024-03-20 ENCOUNTER — APPOINTMENT (OUTPATIENT)
Dept: CARDIOLOGY | Facility: CLINIC | Age: 69
End: 2024-03-20

## 2024-03-21 DIAGNOSIS — J43.9 EMPHYSEMA, UNSPECIFIED: ICD-10-CM

## 2024-03-21 DIAGNOSIS — Z79.02 LONG TERM (CURRENT) USE OF ANTITHROMBOTICS/ANTIPLATELETS: ICD-10-CM

## 2024-03-21 DIAGNOSIS — Z95.818 PRESENCE OF OTHER CARDIAC IMPLANTS AND GRAFTS: ICD-10-CM

## 2024-03-21 DIAGNOSIS — Z51.5 ENCOUNTER FOR PALLIATIVE CARE: ICD-10-CM

## 2024-03-21 DIAGNOSIS — Z66 DO NOT RESUSCITATE: ICD-10-CM

## 2024-03-21 DIAGNOSIS — Z79.899 OTHER LONG TERM (CURRENT) DRUG THERAPY: ICD-10-CM

## 2024-03-21 DIAGNOSIS — R45.1 RESTLESSNESS AND AGITATION: ICD-10-CM

## 2024-03-21 DIAGNOSIS — K21.9 GASTRO-ESOPHAGEAL REFLUX DISEASE WITHOUT ESOPHAGITIS: ICD-10-CM

## 2024-03-21 DIAGNOSIS — Z89.512 ACQUIRED ABSENCE OF LEFT LEG BELOW KNEE: ICD-10-CM

## 2024-03-21 DIAGNOSIS — I95.9 HYPOTENSION, UNSPECIFIED: ICD-10-CM

## 2024-03-21 DIAGNOSIS — K74.60 UNSPECIFIED CIRRHOSIS OF LIVER: ICD-10-CM

## 2024-03-21 DIAGNOSIS — I50.33 ACUTE ON CHRONIC DIASTOLIC (CONGESTIVE) HEART FAILURE: ICD-10-CM

## 2024-03-21 DIAGNOSIS — Z99.81 DEPENDENCE ON SUPPLEMENTAL OXYGEN: ICD-10-CM

## 2024-03-21 DIAGNOSIS — I48.20 CHRONIC ATRIAL FIBRILLATION, UNSPECIFIED: ICD-10-CM

## 2024-03-21 DIAGNOSIS — Z86.73 PERSONAL HISTORY OF TRANSIENT ISCHEMIC ATTACK (TIA), AND CEREBRAL INFARCTION WITHOUT RESIDUAL DEFICITS: ICD-10-CM

## 2024-03-21 DIAGNOSIS — J96.21 ACUTE AND CHRONIC RESPIRATORY FAILURE WITH HYPOXIA: ICD-10-CM

## 2024-03-21 DIAGNOSIS — Z79.82 LONG TERM (CURRENT) USE OF ASPIRIN: ICD-10-CM

## 2024-03-21 DIAGNOSIS — I11.0 HYPERTENSIVE HEART DISEASE WITH HEART FAILURE: ICD-10-CM

## 2024-03-21 DIAGNOSIS — J18.9 PNEUMONIA, UNSPECIFIED ORGANISM: ICD-10-CM

## 2024-03-21 DIAGNOSIS — Z88.1 ALLERGY STATUS TO OTHER ANTIBIOTIC AGENTS: ICD-10-CM

## 2024-03-21 DIAGNOSIS — F41.9 ANXIETY DISORDER, UNSPECIFIED: ICD-10-CM

## 2024-03-28 RX ORDER — CLOPIDOGREL BISULFATE 75 MG/1
75 TABLET, FILM COATED ORAL
Qty: 90 | Refills: 0 | Status: DISCONTINUED | COMMUNITY
Start: 2024-02-26 | End: 2024-03-28

## 2024-04-17 ENCOUNTER — APPOINTMENT (OUTPATIENT)
Dept: INTERNAL MEDICINE | Facility: CLINIC | Age: 69
End: 2024-04-17

## 2024-06-20 NOTE — PROGRESS NOTE ADULT - GUM GEN PE MLT EXAM PC
Emergency Department Trauma Note  Carlos Enrique Roth Jr. 89 y.o. male MRN: [id number]  Unit/Bed#: OR POOL/OR POOL Encounter: [id number]      Trauma Alert: Trauma Acuity: Trauma Evaluation  Model of Arrival:   via    Trauma Team: Current Providers  Attending Provider: Feliberto Morton MD  Attending Provider: Gonzalez Jackson MD  Registered Nurse: Jana Lord RN  Advanced Practitioner: Maria Eugenia Trinidad PA-C  Physician Assistant: Aracelis Mcdaniels PA-C  Consulting Physician: Azam Trinidad MD  Consulting Physician: Aracelis Mcdaniels PA-C  Consulting Physician: Brando Seay DO  Patient Care Assistant: Ama Masters  Registered Nurse: Elfego Miller RN  Registered Nurse: Narda Perez RN  Surgeon: Elmira Fox MD  Patient Care Assistant: Dominique Borrego  Surgeon: Sukh Reece DO  Consultants:     None      History of Present Illness     Chief Complaint:   Chief Complaint   Patient presents with    Fall     Pt from home via ems - bumped into son, fell. L leg shortened and rotated     HPI:  Carlos Enrique Roth Jr. is a 89 y.o. male who presents with .  Mechanism:Details of Incident: fall Injury Date: 06/20/24 Injury Time: 1130 Injury Occurence Location - Specify County: [facility]    Patient is an 89-year-old male with history of aortic stenosis, CKD, CAD, diabetes mellitus, hypertension presents after a fall.  He had a mechanical fall at home falling onto his left side.  Patient is on aspirin 325 mg and trauma evaluation was called.  Patient's only complaint this time is left hip pain.  Left hip is shortened and externally rotated.  He denies head strike headache nausea vomiting altered mental status or focal neurologic deficit.  He denies neck pain chest pain dyspnea abdominal pain.  He denies any weakness numbness or paresthesias left lower extremity.      Review of Systems   Constitutional:  Negative for fever.   HENT:  Negative for sore throat.    Eyes:  Negative for photophobia.   Respiratory:  Negative for shortness of  breath.    Cardiovascular:  Negative for chest pain.   Gastrointestinal:  Negative for abdominal pain.   Genitourinary:  Negative for dysuria.   Musculoskeletal:  Negative for back pain.        L hip pain     Skin:  Negative for rash.   Neurological:  Negative for light-headedness.   Hematological:  Negative for adenopathy.   Psychiatric/Behavioral:  Negative for agitation.    All other systems reviewed and are negative.      Historical Information     Immunizations:   Immunization History   Administered Date(s) Administered    COVID-19 PFIZER VACCINE 0.3 ML IM 02/27/2021, 03/22/2021, 09/29/2021    COVID-19 Pfizer Vac BIVALENT Cornell-sucrose 12 Yr+ IM 10/24/2022    COVID-19 Pfizer vac (Cornell-sucrose, gray cap) 12 yr+ IM 04/06/2022    INFLUENZA 11/12/2014, 09/15/2015, 09/30/2015, 09/21/2016, 11/16/2018    Influenza Split High Dose Preservative Free IM 09/15/2015, 09/30/2015, 09/21/2016, 08/21/2017    Influenza, high dose seasonal 0.7 mL 11/16/2018, 11/08/2019, 09/29/2020    Influenza, seasonal, injectable 10/15/2013, 11/01/2014    Pneumococcal Conjugate 13-Valent 01/09/2018, 01/09/2018    Pneumococcal Polysaccharide PPV23 01/01/2007    TD (adult) Preservative Free 05/23/2013    Td (adult), adsorbed 01/01/2000    Tuberculin Skin Test 09/22/2018, 09/29/2018    Zoster 11/01/2010       Past Medical History:   Diagnosis Date    Aortic stenosis     CKD (chronic kidney disease)     baseline Cr 1.3-1.5    Coronary artery disease     Cough     Diabetes mellitus (HCC)     type 2, insulin dependent    GERD (gastroesophageal reflux disease)     Glaucoma     Gout     History of prostate cancer     Hypertension     Hypothyroidism     Overweight     Peripheral neuropathy, idiopathic     Pleural effusion, left     Pure hypercholesterolemia     LA...11/12/14   R....11/12/14     Visual impairment     cataract left eye    Weight gain        Family History   Problem Relation Age of Onset    Diabetes Mother     Pancreatic cancer Brother      Diabetes Maternal Grandmother     Colon cancer Son     Diabetes Family     Substance Abuse Neg Hx     Mental illness Neg Hx      Past Surgical History:   Procedure Laterality Date    CARDIAC CATHETERIZATION      EYE SURGERY      IR THORACENTESIS  10/23/2018    IR THORACENTESIS  2018    IR THORACENTESIS  2018    IR THORACENTESIS  2018    RI CORONARY ARTERY BYP W/VEIN & ARTERY GRAFT 3 VEIN N/A 2018    Procedure: CORONARY ARTERY BYPASS GRAFT (CABG) x 4 VESSELS with LIMA - LAD, SVG/LEFT LEG EVH - LEFT PDA, OM3, & OM2;  Surgeon: Remy Ash MD;  [facility];  Service: Cardiac Surgery    RI ECHO TRANSESOPHAG MONTR CARDIAC PUMP FUNCTJ N/A 2018    Procedure: TRANSESOPHAGEAL ECHOCARDIOGRAM (VERONICA);  Surgeon: Remy Ash MD;  Location: [facility];  Service: Cardiac Surgery    RI RPLCMT AORTIC VALVE OPN W/STENTLESS TISSUE VALVE N/A 2018    Procedure: REPLACEMENT VALVE AORTIC (AVR)- 23mm tissue Intuity Valve;  Surgeon: Remy Ash MD;  [facility];  Service: Cardiac Surgery    THORACOSCOPY VIDEO ASSISTED SURGERY (VATS) Left 2018    Procedure: THORACOSCOPY VIDEO ASSISTED SURGERY (VATS), talc pleurodesis,;  Surgeon: Ciara Green MD;  Location: [facility];  Service: Thoracic    THYROID SURGERY       Social History     Tobacco Use    Smoking status: Former     Current packs/day: 0.00     Average packs/day: 0.3 packs/day for 1 year (0.3 ttl pk-yrs)     Types: Cigarettes     Start date:      Quit date: 1970     Years since quittin.5    Tobacco comments:     Only in the service    Vaping Use    Vaping status: Never Used   Substance Use Topics    Alcohol use: No    Drug use: No     E-Cigarette/Vaping    E-Cigarette Use Never User      E-Cigarette/Vaping Substances    Nicotine No     THC No     CBD No     Flavoring No     Other No     Unknown No        Family History: non-contributory    Meds/Allergies   Prior to Admission Medications   Prescriptions Last  Dose Informant Patient Reported? Taking?   FeroSul 325 (65 Fe) MG tablet  Self No No   Sig: TAKE ONE TABLET BY MOUTH DAILY   Insulin Pen Needle (Pen Needles) 32G X 4 MM MISC  Self No No   Sig: Use 4 (four) times a day (before meals and at bedtime)   LORazepam (ATIVAN) 0.5 mg tablet  Self No No   Sig: TAKE ONE TABLET BY MOUTH EVERY EIGHT HOURS AS NEEDED FOR ANXIETY   Lantus SoloStar 100 units/mL SOPN   No No   Sig: Inject 0.27 mL (27 Units total) under the skin in the morning   allopurinol (ZYLOPRIM) 300 mg tablet  Self No No   Sig: TAKE ONE TABLET BY MOUTH ONCE DAILY   ascorbic acid (VITAMIN C) 500 mg tablet  Self No No   Sig: TAKE ONE TABLET BY MOUTH DAILY   aspirin (ECOTRIN) 325 mg EC tablet  Self No No   Sig: Take 1 tablet (325 mg total) by mouth daily   cholecalciferol (VITAMIN D3) 1,000 units tablet  Self No No   Sig: Take 1 tablet (1,000 Units total) by mouth daily   dorzolamide-timolol (COSOPT) 22.3-6.8 MG/ML ophthalmic solution  Self Yes No   Sig: Administer 1 drop to both eyes 2 (two) times a day   gabapentin (NEURONTIN) 300 mg capsule  Self No No   Sig: TAKE ONE CAPSULE BY MOUTH DAILY AT BEDTIME   insulin aspart (NovoLOG FlexPen) 100 UNIT/ML injection pen  Self No No   Sig: INJECT 11 UNITS SUBCUTANEOUSLY THREE TIMES DAILY   levothyroxine 150 mcg tablet   No No   Sig: Take 1 tab 6 days a week and half a tab on Sunday.   pantoprazole (PROTONIX) 20 mg tablet  Self No No   Sig: TAKE ONE TABLET BY MOUTH DAILY   rosuvastatin (CRESTOR) 40 MG tablet  Self No No   Sig: TAKE ONE TABLET BY MOUTH DAILY   senna (SENOKOT) 8.6 MG tablet  Self No No   Sig: Take 1 tablet (8.6 mg total) by mouth daily   tamsulosin (FLOMAX) 0.4 mg  Self No No   Sig: TAKE ONE CAPSULE BY MOUTH DAILY WITH dinner   torsemide (DEMADEX) 10 mg tablet  Self No No   Sig: TAKE ONE TABLET BY MOUTH EVERY DAY      Facility-Administered Medications: None       Allergies   Allergen Reactions    Amlodipine Nausea Only    Atorvastatin Myalgia       PHYSICAL  EXAM    PE limited by: NA    Objective   Vitals:   First set: Temperature: (!) 97.2 °F (36.2 °C) (06/20/24 1150)  Pulse: (!) 53 (06/20/24 1150)  Rhythm: Atrial fibrillation - Pulse (06/20/24 1211)  Respirations: 20 (06/20/24 1150)  Blood Pressure: 161/70 (06/20/24 1150)  SpO2: 99 % (06/20/24 1150)    Primary Survey:   (A) Airway: Intact  (B) Breathing: Bilateral breath sounds  (C) Circulation: Pulses:   normal  (D) Disabliity:  GCS Total:  15  (E) Expose:  Completed    Secondary Survey: (Click on Physical Exam tab above)  Physical Exam  Vitals reviewed.   Constitutional:       General: He is not in acute distress.     Appearance: He is well-developed.   HENT:      Head: Normocephalic.   Eyes:      Pupils: Pupils are equal, round, and reactive to light.   Cardiovascular:      Rate and Rhythm: Regular rhythm. Bradycardia present.      Heart sounds: Normal heart sounds. No murmur heard.     No friction rub. No gallop.   Pulmonary:      Effort: Pulmonary effort is normal.      Breath sounds: Normal breath sounds.   Abdominal:      General: Bowel sounds are normal. There is no distension.      Palpations: Abdomen is soft.      Tenderness: There is no abdominal tenderness. There is no guarding.   Musculoskeletal:         General: Normal range of motion.      Cervical back: Normal range of motion and neck supple.      Comments: Left hip hip with some tenderness to palpation.  Left leg externally rotated and shortened.  2+ PT and DP pulses   Skin:     Capillary Refill: Capillary refill takes less than 2 seconds.   Neurological:      Mental Status: He is alert and oriented to person, place, and time.      Cranial Nerves: No cranial nerve deficit.      Sensory: No sensory deficit.      Motor: No abnormal muscle tone.   Psychiatric:         Behavior: Behavior normal.         Thought Content: Thought content normal.         Judgment: Judgment normal.         Cervical spine cleared by clinical criteria? No (imaging required)       Invasive Devices       Peripheral Intravenous Line  Duration             Peripheral IV 06/20/24 Distal;Left;Upper;Ventral (anterior) Arm <1 day                    Lab Results:   Results Reviewed       Procedure Component Value Units Date/Time    APTT [[id number]]  (Normal) Collected: 06/20/24 1434    Lab Status: Final result Specimen: Blood from Arm, Left Updated: 06/20/24 1457     PTT 26 seconds     Protime-INR [[id number]]  (Normal) Collected: 06/20/24 1434    Lab Status: Final result Specimen: Blood from Arm, Left Updated: 06/20/24 1457     Protime 14.5 seconds      INR 1.09    Hemoglobin A1c w/EAG Estimation (Prechecked if no A1C within 90 days) [[id number]]     Lab Status: No result Specimen: Blood     CBC and differential [[id number]]  (Abnormal) Collected: 06/20/24 1209    Lab Status: Final result Specimen: Blood from Line Updated: 06/20/24 1243     WBC 8.50 Thousand/uL      RBC 3.73 Million/uL      Hemoglobin 12.0 g/dL      Hematocrit 38.3 %       fL      MCH 32.2 pg      MCHC 31.3 g/dL      RDW 13.2 %      MPV 10.7 fL      Platelets 129 Thousands/uL      nRBC 0 /100 WBCs      Segmented % 64 %      Immature Grans % 1 %      Lymphocytes % 23 %      Monocytes % 7 %      Eosinophils Relative 4 %      Basophils Relative 1 %      Absolute Neutrophils 5.54 Thousands/µL      Absolute Immature Grans 0.04 Thousand/uL      Absolute Lymphocytes 1.93 Thousands/µL      Absolute Monocytes 0.62 Thousand/µL      Eosinophils Absolute 0.30 Thousand/µL      Basophils Absolute 0.07 Thousands/µL     Basic metabolic panel [[id number]]  (Abnormal) Collected: 06/20/24 1209    Lab Status: Final result Specimen: Blood from Line Updated: 06/20/24 1232     Sodium 138 mmol/L      Potassium 4.2 mmol/L      Chloride 107 mmol/L      CO2 26 mmol/L      ANION GAP 5 mmol/L      BUN 35 mg/dL      Creatinine 2.18 mg/dL      Glucose 172 mg/dL      Calcium 9.1 mg/dL      eGFR 25 ml/min/1.73sq m     Narrative:      National Kidney Disease  Foundation guidelines for Chronic Kidney Disease (CKD):     Stage 1 with normal or high GFR (GFR > 90 mL/min/1.73 square meters)    Stage 2 Mild CKD (GFR = 60-89 mL/min/1.73 square meters)    Stage 3A Moderate CKD (GFR = 45-59 mL/min/1.73 square meters)    Stage 3B Moderate CKD (GFR = 30-44 mL/min/1.73 square meters)    Stage 4 Severe CKD (GFR = 15-29 mL/min/1.73 square meters)    Stage 5 End Stage CKD (GFR <15 mL/min/1.73 square meters)  Note: GFR calculation is accurate only with a steady state creatinine                   Imaging Studies:   Direct to CT: No  TRAUMA - CT head wo contrast   Final Result by Hernan Dee MD (06/20 1257)      No acute intracranial abnormality.            This was discussed with Dr. Almanzar at 12:51 p.m.      Workstation performed: [id number]         TRAUMA - CT spine cervical wo contrast   Final Result by Hernan Dee MD (06/20 1255)      No cervical spine fracture or traumatic malalignment.         This was discussed with Dr. Almanzar at 12:51 p.m.         Workstation performed: [id number]         TRAUMA - CT abdomen pelvis w contrast   Final Result by Hernan Dee MD (06/20 1253)      Comminuted intratrochanteric fracture of the left hip.      No evidence of solid organ trauma or ascites.      Gallstone.      Hyperdense lesion involves the right kidney and could represent a hyperdense cyst or nodule. Nonemergent follow-up ultrasound is advised.      Asymmetric density in the left prostate is only slightly more pronounced than 2017. Correlation with PSA is advised.      High density pleural thickening at the left lung base is probably related to old inflammation given pleural effusion in 2018.      This was discussed with Dr. Higgins at 12:51 p.m.      Workstation performed: [id number]         XR Trauma pelvis ap only 1 or 2 vw   Final Result by Hernan Dee MD (06/20 1258)      Intertrochanteric fracture left hip. This was discussed with Dr. Almanzar's at 12:51  p.m.      Workstation performed: [id number]         XR Trauma chest portable   Final Result by Lucila Horan MD (06/20 1247)      No acute cardiopulmonary disease.      No acute displaced fracture.      Workstation performed: [id number]         XR femur 2 vw left    (Results Pending)   XR hip/pelv 1 vw left if performed    (Results Pending)         Procedures  ECG 12 Lead Documentation Only    Date/Time: 6/20/2024 7:00 PM    Performed by: Feliberto Morton MD  Authorized by: Feliberto Morton MD    ECG reviewed by me, the ED Provider: yes    Patient location:  ED  Previous ECG:     Previous ECG:  Unavailable    Comparison to cardiac monitor: Yes    Interpretation:     Interpretation: abnormal    Rate:     ECG rate assessment: bradycardic    Rhythm:     Rhythm: atrial fibrillation    Ectopy:     Ectopy: none    QRS:     QRS axis:  Normal    QRS intervals:  Normal  Conduction:     Conduction: normal    ST segments:     ST segments:  Normal  T waves:     T waves: normal             ED Course  ED Course as of 06/20/24 1858   Thu Jun 20, 2024   1232 Creatinine(!): 2.18  Baseline     1249 L intertrochanteric fx            Medical Decision Making  Patient is an 89-year-old male who presents for evaluation of left hip pain found to have an intertrochanteric fracture.  CT imaging reviewed no other traumatic injuries found.  Blood work unremarkable.  Patient also noted to be in A-fib with slow response which seems to be a new diagnosis for him.  Discussed with cardiology and Ortho, we will hold on TXA at this time secondary to possible clotting risk.    Amount and/or Complexity of Data Reviewed  Labs: ordered. Decision-making details documented in ED Course.  Radiology: ordered.    Risk  Prescription drug management.  Decision regarding hospitalization.                Disposition  [facility] Transfer: No  Final diagnoses:   Closed fracture of left hip, initial encounter (HCC)   New onset atrial fibrillation  ([facility])     Time reflects when diagnosis was documented in both MDM as applicable and the Disposition within this note       Time User Action Codes Description Comment    6/20/2024  1:13 PM Feliberto Morton Add [S72.002A] Closed fracture of left hip, initial encounter ([facility])     6/20/2024  1:13 PM Feliberto Morton Add [I48.91] New onset atrial fibrillation ([facility])     6/20/2024  3:14 PM Gonzalez Jackson Add [I48.91] New onset a-fib ([facility])     6/20/2024  3:37 PM Brando Seay Add [S72.142A] Closed displaced intertrochanteric fracture of left femur, initial encounter ([facility])           ED Disposition       ED Disposition   Admit    Condition   Stable    Date/Time   Thu Jun 20, 2024  1:13 PM    Comment   Case was discussed with TONG and the patient's admission status was agreed to be Admission Status: inpatient status to the service of Dr. Jackson .               Follow-up Information    None       Current Discharge Medication List        CONTINUE these medications which have NOT CHANGED    Details   allopurinol (ZYLOPRIM) 300 mg tablet TAKE ONE TABLET BY MOUTH ONCE DAILY  Qty: 90 tablet, Refills: 3    Comments: This prescription was filled on 12/8/2023. Any refills authorized will be placed on file.  Associated Diagnoses: Chronic gout with tophus, unspecified cause, unspecified site      ascorbic acid (VITAMIN C) 500 mg tablet TAKE ONE TABLET BY MOUTH DAILY  Qty: 28 tablet, Refills: 3    Associated Diagnoses: Healthcare maintenance      aspirin (ECOTRIN) 325 mg EC tablet Take 1 tablet (325 mg total) by mouth daily  Qty: 30 tablet, Refills: 0    Associated Diagnoses: Coronary artery disease involving native coronary artery of native heart without angina pectoris      cholecalciferol (VITAMIN D3) 1,000 units tablet Take 1 tablet (1,000 Units total) by mouth daily  Qty: 90 tablet, Refills: 3    Associated Diagnoses: Secondary hyperparathyroidism (HCC)      dorzolamide-timolol (COSOPT) 22.3-6.8 MG/ML ophthalmic solution Administer 1  drop to both eyes 2 (two) times a day      FeroSul 325 (65 Fe) MG tablet TAKE ONE TABLET BY MOUTH DAILY  Qty: 28 tablet, Refills: 3    Associated Diagnoses: Anemia, unspecified type      gabapentin (NEURONTIN) 300 mg capsule TAKE ONE CAPSULE BY MOUTH DAILY AT BEDTIME  Qty: 90 capsule, Refills: 3    Associated Diagnoses: Neuropathy      insulin aspart (NovoLOG FlexPen) 100 UNIT/ML injection pen INJECT 11 UNITS SUBCUTANEOUSLY THREE TIMES DAILY  Qty: 15 mL, Refills: 3    Comments: This prescription was filled on 1/1/2024. Any refills authorized will be placed on file.  Associated Diagnoses: Type 2 diabetes mellitus with stage 3 chronic kidney disease, without long-term current use of insulin, unspecified whether stage 3a or 3b CKD ([facility])      Insulin Pen Needle (Pen Needles) 32G X 4 MM MISC Use 4 (four) times a day (before meals and at bedtime)  Qty: 100 each, Refills: 11    Associated Diagnoses: Type 2 diabetes mellitus with stage 3b chronic kidney disease, without long-term current use of insulin ([facility])      Lantus SoloStar 100 units/mL SOPN Inject 0.27 mL (27 Units total) under the skin in the morning  Qty: 15 mL, Refills: 3    Associated Diagnoses: Type 2 diabetes mellitus with diabetic neuropathy, with long-term current use of insulin ([facility])      levothyroxine 150 mcg tablet Take 1 tab 6 days a week and half a tab on Sunday.  Qty: 90 tablet, Refills: 3    Comments: This prescription was filled on 4/24/2023. Any refills authorized will be placed on file.  Associated Diagnoses: Hypothyroidism, unspecified type      LORazepam (ATIVAN) 0.5 mg tablet TAKE ONE TABLET BY MOUTH EVERY EIGHT HOURS AS NEEDED FOR ANXIETY  Qty: 30 tablet, Refills: 3    Comments: This prescription was filled on 3/20/2024. Any refills authorized will be placed on file.  Associated Diagnoses: Anxiety      pantoprazole (PROTONIX) 20 mg tablet TAKE ONE TABLET BY MOUTH DAILY  Qty: 30 tablet, Refills: 3    Comments: This prescription was filled on  3/25/2024. Any refills authorized will be placed on file.  Associated Diagnoses: S/P CABG x 4      rosuvastatin (CRESTOR) 40 MG tablet TAKE ONE TABLET BY MOUTH DAILY  Qty: 90 tablet, Refills: 3    Associated Diagnoses: S/P CABG x 4; S/P AVR (aortic valve replacement)      senna (SENOKOT) 8.6 MG tablet Take 1 tablet (8.6 mg total) by mouth daily  Qty: 90 tablet, Refills: 3    Associated Diagnoses: Constipation, unspecified constipation type      tamsulosin (FLOMAX) 0.4 mg TAKE ONE CAPSULE BY MOUTH DAILY WITH dinner  Qty: 30 capsule, Refills: 6    Comments: This prescription was filled on 3/25/2024. Any refills authorized will be placed on file.  Associated Diagnoses: Benign prostatic hyperplasia with nocturia      torsemide (DEMADEX) 10 mg tablet TAKE ONE TABLET BY MOUTH EVERY DAY  Qty: 90 tablet, Refills: 1    Comments: This prescription was filled on 4/25/2024. Any refills authorized will be placed on file.  Associated Diagnoses: Edema, unspecified type           No discharge procedures on file.    PDMP Review         Value Time User    PDMP Reviewed  Yes 3/20/2024  2:26 PM Juliette Cid DO            ED Provider  Electronically Signed by           Feliberto Morton MD  06/20/24 0328     Normal genitalia; no lesions; no discharge

## 2024-07-12 NOTE — PROCEDURE NOTE - NSAPPROACH_GEN_A_CORE
CLINICAL PHARMACY NOTE: MEDS TO BEDS    Total # of Prescriptions Filled: 4   The following medications were delivered to the patient:  Norco 5-325  Metformin er 500mg  Cephalexin 500mg  Amlodipine 10mg    Additional Documentation:norco 5-325     via natural/artificial opening

## 2024-08-01 ENCOUNTER — APPOINTMENT (OUTPATIENT)
Dept: FAMILY MEDICINE | Facility: CLINIC | Age: 69
End: 2024-08-01

## 2024-08-08 NOTE — PROGRESS NOTE BEHAVIORAL HEALTH - BODY HABITUS
"Ing.  Consultation - Cardiothoracic Surgery   Glendy Pete 80 y.o. female MRN: [id number]    Physician Requesting Consult: Dr. Arreguin    Reason for Consult / Principal Problem: Aortic stenosis, Non-Rheumatic    History of Present Illness: Glendy Pete is a 80 y.o. year old female who presents for evaluation of severe AS. Relocated to the area from [place] in 2023. Prior history of aortic stenosis which dates back to at least 5 years ago & was being monitored by cardiology in [place]. States she was having presyncopal episodes & was admitted to the hospital which is how the AS was found. Since that time she has followed w/ a cardiologist there & had progressive syncopal episodes leading to a dx of tachy-dev syndrome/PAF & she is now s/p SJM PPM & on AC as well as prior Flecainide therapy & not Cardizem therapy. She has since relocated to [place] ~1-1.5 years ago & has established w/ cardiology & is receiving routine TTEs. On a PCP exam a carotid bruit was ausculted leading to a US showing LICA stenosis. Since then she has established w/ a vascular sx, had a CTA head/neck confirming LICA stenosis, went to cardiology for \"clearance\" from cardiology after an updated TTE showed progression of aortic valvular disease to severe likely due to being asymptomatic w/ the advice to be seen by the cardiac surgeon after carotid intervention. Upon arrival to the hospital for her L carotid angioplasty, the anesthesiologist advised postponing surgery until she had her valve addressed therefore she presents today.    Upon examination today, Ms. Pete reports she feeling well. Denies CP, palpitations, SOB, HERNANDEZ, LE edema b/l, orthopnea, PND, numbness/tinlging/paresthesias in UE or LE bilaterally, HA, lightheadeness, dizziness, presyncopal symptoms, hx syncopal events, N/V/D, hemoptysis, hematemesis, hematochezia, melena. Denies hx stroke, hx cancer w/ chest wall radiation, hx blood loss anemia, hx varicose veins or vein stripping.    PMH "
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 PSH includes SJM PPM, right craniotomy, colon resection, melanoma excision. FH significant for maternal HD (unsure type). Denies FH of CAD/MI, HTN, HL, valvular disease, DM, aortic aneurysms, or SCD. Patient is retired & worked as cosmatologist. Lives in a apartment in the home of her brother. Does not use an assist device for ambulation. Drives. Independent ADLs. Denies current or prior use of tobacco, ETOH, or drug use. Allergies include Codeine with rxn N. Cardiac pertinent medications include: Eliquis, Cardizem 180mg, Losartan 100mg, Toprol 25mg. Other medications can be reviewed in the patient chart.    Patient sees Luc Mathew PA-C as her primary care provider, their prior documentation were reviewed at this visit. Patient sees Dr. Almanza as her vascular sx, their prior visit documentation was reviewed at this visit. Patient sees Dr. Arreguin as her cardiologist, their proior visit documentation was reviewed at this visit. Patient does see a dentist regularly with last appointment >1 year ago ([facility] while in [place]). (+) dentures.     Past Medical History:  Past Medical History:   Diagnosis Date    Asthma     Carotid artery stenosis     left asymptomatic 75-80%, right asymptomatic 40-45%    Carotid stenosis, asymptomatic, left     70%    CKD (chronic kidney disease), stage III (HCC)     baseline Cr 0.8-1.0    History of colon cancer     s/p resection & chemotherapy    History of melanoma excision     History of nonadherence to medical treatment     History of syncope     s/p SJM PPM    [place] (hard of hearing)     no aids    Hypertension     Hypothyroidism     PAF (paroxysmal atrial 
fibrillation) (HCC)     prior Flecanide, Cardizem, Eliquis    Severe aortic stenosis      Past Surgical History:   Past Surgical History:   Procedure Laterality Date    CARDIAC PACEMAKER PLACEMENT  2020    [facility]    COLON SURGERY  1992    colon resection with chemo    CRANIOTOMY Right 1989    R bleed-from fall and hit head    SKIN CANCER EXCISION Right     Melanoma R arm     Family History:  Family History   Problem Relation Age of Onset    Heart disease Mother     COPD Sister     Emphysema Sister     Colon cancer Brother     Liver cancer Paternal Grandfather      Social History:  Social History     Substance and Sexual Activity   Alcohol Use Not Currently     Social History     Substance and Sexual Activity   Drug Use Never     Social History     Tobacco Use   Smoking Status Never   Smokeless Tobacco Never       Home Medications:   Prior to Admission medications    Medication Sig Start Date End Date Taking? Authorizing Provider   albuterol (PROVENTIL HFA,VENTOLIN HFA) 90 mcg/act inhaler Inhale 2 puffs every 6 (six) hours as needed for wheezing 2/29/24   Darrel Mathew PA-C   apixaban (ELIQUIS) 5 mg Take 1 tablet (5 mg total) by mouth 2 (two) times a day 5/4/24   Darrel Mathew PA-C   atorvastatin (LIPITOR) 20 mg tablet Take 1 tablet (20 mg total) by mouth daily 2/19/24   FLORENCIO Richardson   diltiazem (CARDIZEM CD) 180 mg 24 hr capsule Take 1 capsule (180 mg total) by mouth daily 7/23/24   Paras Arreguin MD   Fluticasone Furoate-Vilanterol 100-25 mcg/actuation inhaler Inhale 1 puff daily Rinse mouth after use.    Historical Provider, MD   losartan (COZAAR) 100 MG tablet Take 1 tablet (100 mg total) by mouth daily 2/29/24   Darrel Mathew PA-C   losartan (COZAAR) 50 mg tablet Take 50 mg by mouth daily  Patient not taking: Reported on 7/1/2024 5/3/24   Historical Provider, MD   metoprolol succinate (TOPROL-XL) 25 mg 24 hr tablet Take 1 tablet (25 mg total) by mouth daily 1/4/24   Kamar Schaffer, 
"MD   montelukast (SINGULAIR) 10 mg tablet Take 10 mg by mouth    Historical Provider, MD   ondansetron (Zofran ODT) 4 mg disintegrating tablet Take 1 tablet (4 mg total) by mouth every 6 (six) hours as needed for nausea or vomiting 1/14/24   Luis Carter PA-C   temazepam (RESTORIL) 15 mg capsule Take 15 mg by mouth daily at bedtime as needed for sleep    Historical Provider, MD   thyroid (ARMOUR) 15 MG tablet Take 15 mg by mouth daily    Historical Provider, MD   triamcinolone (KENALOG) 0.1 % cream APPLY TO AFFECTED AREA TWICE A DAY  Patient taking differently: Apply 1 Application topically 2 (two) times a day as needed To affected area 6/29/24   Darrel Mathew PA-C       Allergies:  Allergies   Allergen Reactions    Codeine Nausea Only       Review of Systems:     Review of Systems   Constitutional: Negative.  Negative for activity change, diaphoresis, fatigue and unexpected weight change.   HENT:  Positive for dental problem.    Respiratory: Negative.  Negative for chest tightness, shortness of breath and wheezing.    Cardiovascular: Negative.  Negative for chest pain, palpitations and leg swelling.   Gastrointestinal: Negative.  Negative for blood in stool, constipation, diarrhea, nausea and vomiting.   Genitourinary: Negative.    Musculoskeletal: Negative.  Negative for arthralgias, back pain, gait problem and myalgias.   Skin: Negative.    Neurological: Negative.  Negative for dizziness, syncope, weakness, light-headedness, numbness and headaches.   Hematological: Negative.  Does not bruise/bleed easily.   All other systems reviewed and are negative.      Vital Signs:     Vitals:    08/08/24 1341 08/08/24 1351   BP: 163/67 148/65   BP Location: Left arm Right arm   Patient Position: Sitting Sitting   Cuff Size: Standard Standard   Pulse: 60    SpO2: 95%    Weight: 58.1 kg (128 lb)    Height: 5' 2\" (1.575 m)        Physical Exam:     Physical Exam  Constitutional:       General: She is not in acute "
"distress.     Appearance: Normal appearance. She is well-developed. She is not ill-appearing or toxic-appearing.      Comments: Sitting on exam table in NAD   HENT:      Head: Normocephalic and atraumatic.   Neck:      Vascular: No carotid bruit or JVD.      Trachea: Trachea normal.   Cardiovascular:      Rate and Rhythm: Normal rate and regular rhythm.      Chest Wall: PMI is not displaced.      Pulses:           Carotid pulses are  on the right side with bruit.     Heart sounds: S1 normal and S2 normal. Murmur heard.      Systolic murmur is present with a grade of 5/6.      No friction rub. No gallop. No S3 or S4 sounds.      Comments: No heaves/lifts on palpation  Pulmonary:      Effort: Pulmonary effort is normal. No accessory muscle usage or respiratory distress.      Breath sounds: Normal breath sounds. No wheezing, rhonchi or rales.   Abdominal:      General: Bowel sounds are normal. There is no distension.      Palpations: Abdomen is not rigid. There is no mass.      Tenderness: There is no abdominal tenderness. There is no guarding or rebound.   Musculoskeletal:      Cervical back: Normal range of motion and neck supple.   Skin:     General: Skin is warm, dry and intact.      Comments: Trace b/l LE; no VV to b/l LE   Neurological:      Mental Status: She is alert and oriented to person, place, and time.      Cranial Nerves: No cranial nerve deficit.      Sensory: No sensory deficit.      Comments: No focal deficits   Psychiatric:         Mood and Affect: Mood and affect normal.       Lab Results:               Invalid input(s): \"LABGLOM\"      No results found for: \"HGBA1C\"  No results found for: \"CKTOTAL\", \"CKMB\", \"CKMBINDEX\", \"TROPONINI\"    Imaging Studies:     Echocardiogram: EF 60%, severe AS (mean 47, peak 67, RUPA 0.6cm2), mild MS, mild TR    I have personally reviewed pertinent reports.   and I have personally reviewed pertinent films in PACS    TAVR evaluation Assessment:     [facility]: II    STS Risk "
Score: 3.9 %, mortality risk    Aortic Stenosis Stage: C1    5 Meter Walk Test:      Attempt 1: 6   Attempt 2: 6   Attempt 3: 7    KCCQ-12 completed    Assessment:  Patient Active Problem List    Diagnosis Date Noted    Stage 3a chronic kidney disease (HCC) 02/29/2024    Severe aortic stenosis 02/29/2024    Paroxysmal atrial fibrillation (HCC) 02/29/2024    Carotid stenosis, asymptomatic, left 02/19/2024    Hypothyroid 04/07/2023    Primary hypertension 04/07/2023    Mild intermittent asthma without complication 04/07/2023    Pacemaker 04/07/2023    Hearing impairment 04/07/2023    History of colon cancer 04/07/2023    History of melanoma 01/14/2013     Severe aortic stenosis; Ongoing TAVR workup    Plan:    Glendy Pete has severe symptomatic aortic stenosis. Based on their STS risk assessment, they will undergo the following testing for transcatheter aortic valve replacement: gated CTA of the chest, abdomen, and pelvis, cardiac catheterization, and dental clearance.    Once these studies have been completed, Glendy Pete will follow up in our office to review the results and confirm the suitability of proceeding with transcatheter aortic valve replacment.    Shared decision-making encounter occurred during this visit. Additionally, heart team evaluation of suitability for surgical replacement has been completed.      Glendy Pete was comfortable with our recommendations, and their questions were answered to their satisfaction.  We will continue to evaluate the patient, with a final surgical recommendation pending the above work up.  Thank you for allowing us to participate in the care of this patient.     Routine referral to gastroenterology for colonoscopy screening was not indicated, as the patient is over 77 years old    SIGNATURE: Pura Gamboa PA-C  DATE: August 8, 2024  TIME: 2:30 PM    * This note was completed in part utilizing m-Zipdial fluency direct voice recognition software.   Grammatical errors, random 
word insertion, spelling mistakes, and incomplete sentences may be an occasional consequence of the system secondary to software limitations, ambient noise and hardware issues. At the time of dictation, efforts were made to edit, clarify and /or correct errors. Please read the chart carefully and recognize, using context, where substitutions have occurred.  If you have any questions or concerns about the context, text or information contained within the body of this dictation, please contact myself, the provider, for further clarification.   
Average build

## 2024-08-16 NOTE — PROCEDURE NOTE - NSTRACHCUFF_RESP_A_CORE
Anesthesia Post-op Note    Patient: Marimar Kearns  Procedure(s) Performed: colonoscopy   Anesthesia type: MAC    Vitals Value Taken Time   Temp 36.7 °C (98.1 °F) 08/16/24 1242   Pulse 74 08/16/24 1242   Resp 17 08/16/24 1242   SpO2 94 % 08/16/24 1242   /72 08/16/24 1242         Patient Location: Phase II  Post-op Vital Signs:stable  Level of Consciousness: participates in exam, awake, oriented and alert  Respiratory Status: spontaneous ventilation  Cardiovascular blood pressure returned to baseline  Hydration: euvolemic  Pain Management: well controlled  Handoff: Handoff to receiving clinician was performed and questions were answered  Vomiting: none  Nausea: None  Airway Patency:patent  Post-op Assessment: awake, alert, appropriately conversant, or baseline, no complications, patient tolerated procedure well, no evidence of recall, dentition within defined limits, moving all extremities and No Corneal Abrasion      There were no known notable events for this encounter.                      
cuffed

## 2024-08-19 NOTE — PROGRESS NOTE ADULT - PROBLEM SELECTOR PROBLEM 2
Acute on chronic diastolic congestive heart failure
No

## 2024-09-06 NOTE — PROCEDURAL SAFETY CHECKLIST WITH OR WITHOUT SEDATION - NSCNFIRMBLDLOSS_GEN_ALL_CORE
IUD information:     Benefits: The IUD can be 97-99% effective when carefully following directions regarding use. It can be more effective if used with additional contraception. IUD containing progestin may decrease menstrual flow and menstrual cramping.     Risks/Side Effects: include but are not limited to spotting, bleeding, hemorrhage, or anemia: cramping or pain: partial or complete expulsion of device; lost IUD strings; uterine or cervical perforation; embedding of IUD in the uterine wall; increased risk of pelvic inflammatory disease. Women who become pregnant with an IUD in place are at a higher risk for ectopic pregnancy should a pregnancy occur with an IUD in situ. There is a higher rate of miscarriage when pregnancy occurs with IUD in place.    Warning signs: Please call clinic if you have abnormal spotting or heavy bleeding, abdominal pain, dyspareunia, fever, chills, flu like symptoms, or unable to locate strings of IUD, or strings are longer or shorter than expected.    You are encouraged to use condoms for prevention of STD. You may also need back up contraception for 7 days with your IUD. You may use pain medications (ibuprofen) as needed for mild to moderate pain. Please follow-up in clinic in 4-6 weeks for IUD string check if unable to find strings or as directed by provider.    
n/a

## 2024-12-05 NOTE — DISCHARGE NOTE PROVIDER - CARE PROVIDER_API CALL
Lon Hein)  Cardiovascular Disease  [address] [place] [address]  Phone: [phone]  Fax: [phone]  Follow Up Time:     Lon Lenz)  Cardiac Electrophysiology; Cardiovascular Disease  [address] [place] [address]  Phone: [phone]  Fax: [phone]  Follow Up Time: Unable to assess due to medical condition

## 2025-01-16 NOTE — DISCHARGE NOTE NURSING/CASE MANAGEMENT/SOCIAL WORK - NSCORESITESY/N_GEN_A_CORE_RD
Subjective   Reason for Visit: Michael Dougherty is an 80 y.o. male here for a Medicare Wellness visit.     Past Medical, Surgical, and Family History reviewed and updated in chart.    Reviewed all medications by prescribing practitioner or clinical pharmacist (such as prescriptions, OTCs, herbal therapies and supplements) and documented in the medical record.    HPI    HPI: Annual Wellness visit. The patient has not felt down, felt depressed, felt hopeless or had little interest or pleasure in doing things over the past 6 weeks. States difficulty with driving and managing money, but no trouble with bathing, dressing, eating, with mobility, toileting, house work, medications, shopping or transportation. Reports no falls. The home has grab bars in the bathroom and handrails on the stairs. The home does not have poor lighting or rugs in the hallway, clutter, uneven floors Denies hearing difficulty in the ears bilaterally. No diet restrictions.     HTN: well controlled    DM2: a1c was 5.9.  this time which is an improvement.    CRI: kidneys looked good on this blood draw.    Dementia: has been having worsening cognition in recent years.  Forgets tasks, directions.  Needs more help doing finances.  Mother had Alzheimers.      Preparing meals - independent  Using telephone - independent  Bladder - continent,  Bowel - continent    Opioid use - no  Exercise -    no but states he is active.   Diet - noncompliant.  Eats sweats and carbs  Pain score - zero    Providers  Podiatry - Dr. Polanco - 7-2020  Kyree - Eleanor - has appt 9-2020    Counseled pt on living will: has living will    AAA screening: NA     Prostate CA screen: PSA check    CV screening: CA score ordered    Colonoscopy: due in 2026, had a number of larger polyps    Diabetes screening:  treated    Glaucoma screen: sees optho    CT chest tob screen: NA    Vaccines:       Patient Care Team:  Ulices Chaudhary MD as PCP - General  Ulices Chaudhary MD as PCP - MSSP ACO  "Attributed Provider  Lencho Soliman as Consulting Physician (Orthopaedic Surgery)  Stormy Johnson MD as Consulting Physician (Hematology and Oncology)     Review of Systems   All other systems reviewed and are negative.      Objective   Vitals:  /76   Pulse 55   Temp 36.3 °C (97.4 °F)   Ht 1.778 m (5' 10\")   Wt 100 kg (221 lb)   SpO2 97%   BMI 31.71 kg/m²       Physical Exam  Vitals reviewed.   Constitutional:       General: He is not in acute distress.     Appearance: Normal appearance. He is well-developed. He is not diaphoretic.   HENT:      Head: Normocephalic and atraumatic.      Right Ear: Tympanic membrane normal.      Left Ear: Tympanic membrane normal.      Nose: Nose normal.      Mouth/Throat:      Mouth: Mucous membranes are moist.   Eyes:      Pupils: Pupils are equal, round, and reactive to light.   Cardiovascular:      Rate and Rhythm: Normal rate and regular rhythm.      Heart sounds: Normal heart sounds. No murmur heard.     No friction rub. No gallop.   Pulmonary:      Effort: Pulmonary effort is normal.      Breath sounds: Normal breath sounds. No rales.   Abdominal:      General: Bowel sounds are normal.      Palpations: Abdomen is soft.      Tenderness: There is no abdominal tenderness.   Musculoskeletal:      Cervical back: Normal range of motion and neck supple.   Skin:     General: Skin is warm and dry.   Neurological:      Mental Status: He is alert.   Psychiatric:         Mood and Affect: Mood normal.         Assessment/Plan   Problem List Items Addressed This Visit       Diabetes mellitus (Multi)    Overview     Note: ak         Relevant Orders    POCT Albumin random urine manually resulted     Other Visit Diagnoses       Frequency of urination    -  Primary    Relevant Orders    POCT UA Automated manually resulted (Completed)          Doing well.  Refilled meds.  Follow up in 6 mo.  Counseled pt about giving up his driving privileges as his cognition is effected.  " No

## 2025-02-18 NOTE — SWALLOW VFSS/MBS ASSESSMENT ADULT - ASPIRATION DURING THE SWALLOW - COUGH
Problem: Adult Inpatient Plan of Care  Goal: Absence of Hospital-Acquired Illness or Injury  Intervention: Prevent Skin Injury  Recent Flowsheet Documentation  Taken 2/18/2025 0200 by Lor Lozada RN  Body Position: position changed independently     Problem: Adult Inpatient Plan of Care  Goal: Absence of Hospital-Acquired Illness or Injury  Intervention: Prevent and Manage VTE (Venous Thromboembolism) Risk  Recent Flowsheet Documentation  Taken 2/18/2025 0138 by Lor Lozada RN  VTE Prevention/Management: bilateral     Goal Outcome Evaluation:  Plan of Care Reviewed With: patient        Progress: no change                                 Mild

## 2025-04-08 NOTE — PATIENT PROFILE ADULT - PACKS PER DAY
Patrick,   Consider HPV vaccine?   Labs including bloodwork and/or urine is ordered today. The lab can be found on this floor (2nd floor) next to the pharmacy across from the elevators.    Continue CGM     BEST OF LUCK - YOU WILL BE MISSED!   
1

## 2025-05-16 NOTE — PROGRESS NOTE ADULT - SUBJECTIVE AND OBJECTIVE BOX
Second attempt to call pt re: refill request from pharmacy as well as to schedule annual appointment. Voicemail left.    Medication: drospirenone-ethinyl estradiol (BROOKE) 3-0.02 MG per tablet   Medication refill denied. Refills are on file at pharmacy.    Patient is a 65y old  Male who presents with a chief complaint of acute hypoxemic, hypercapneic resp failure  COPD exacerbation (01 May 2020 18:20)      BRIEF HOSPITAL COURSE: 64 y/o M with a h/o Afib s/p Watchman's device (4/19), HTN, prior CVAs, ETOH abuse, smoker, COPD, HFpEF, Cirrhosis, s/p L BKA, admitted on 4/18 with acute hypercapnic respiratory failure secondary to COPD exacerbation. Required endotracheal intubation and was extubated on 4/26, however required re-intubation on 4/29 for recurrent respiratory failure. Hospital course complicated by AF with RVR, EtOH withdrawal, septic shock, and MRSA/CRE pseudomonas pneumonia,    Events last 24 hours: Patient remains requiring full mechanical ventilator support. Sedated on propofol infusion. HR better controlled, off diltiazem infusion. Weaned from IV vasopressor support.      PAST MEDICAL & SURGICAL HISTORY:  Chronic CHF  COPD without exacerbation  Atrial fibrillation  Presence of Watchman left atrial appendage closure device  Hypertension  GERD (gastroesophageal reflux disease)  Chronic obstructive pulmonary disease (COPD)  Anemia  Falls  Meningitis  Collapsed lung  Alcohol withdrawal  Emphysema of lung  Cirrhosis  CHF (congestive heart failure)  Poor historian  Alcohol abuse  Chronic atrial fibrillation  Unilateral amputation of lower extremity below knee  Presence of Watchman left atrial appendage closure device  S/P BKA (below knee amputation) unilateral, left      Review of Systems:  Unable to obtain as patient is sedate/intubated      Medications:  ciprofloxacin   IVPB 400 milliGRAM(s) IV Intermittent every 12 hours  vancomycin  IVPB 1250 milliGRAM(s) IV Intermittent every 12 hours  digoxin  Injectable 0.125 milliGRAM(s) IV Push daily  midodrine 10 milliGRAM(s) Oral every 8 hours  norepinephrine Infusion 0.05 MICROgram(s)/kG/Min IV Continuous <Continuous>  ALBUTerol    90 MICROgram(s) HFA Inhaler 2 Puff(s) Inhalation every 4 hours  budesonide 160 MICROgram(s)/formoterol 4.5 MICROgram(s) Inhaler 2 Puff(s) Inhalation two times a day  tiotropium 18 MICROgram(s) Capsule 1 Capsule(s) Inhalation daily  acetaminophen   Tablet .. 650 milliGRAM(s) Oral every 6 hours PRN  dexMEDEtomidine Infusion 1 MICROgram(s)/kG/Hr IV Continuous <Continuous>  gabapentin 400 milliGRAM(s) Oral three times a day  LORazepam   Injectable 1 milliGRAM(s) IV Push every 6 hours PRN  aspirin  chewable 81 milliGRAM(s) Oral daily  clopidogrel Tablet 75 milliGRAM(s) Oral daily  heparin   Injectable 5000 Unit(s) SubCutaneous every 8 hours  pantoprazole  Injectable 40 milliGRAM(s) IV Push daily  polyethylene glycol 3350 17 Gram(s) Oral daily PRN  predniSONE   Tablet   Oral   folic acid 1 milliGRAM(s) Oral daily  multivitamin/minerals/iron Oral Solution (CENTRUM) 15 milliLiter(s) Oral daily  thiamine 100 milliGRAM(s) Oral daily  chlorhexidine 0.12% Liquid 15 milliLiter(s) Oral Mucosa every 12 hours        Mode: AC/ CMV (Assist Control/ Continuous Mandatory Ventilation)  RR (machine): 12  TV (machine): 550  FiO2: 40  PEEP: 5  ITime: 0.1  MAP: 11  PIP: 24      ICU Vital Signs Last 24 Hrs  T(C): 36.2 (01 May 2020 19:23), Max: 39.4 (01 May 2020 12:00)  T(F): 97.1 (01 May 2020 19:23), Max: 103 (01 May 2020 13:00)  HR: 63 (01 May 2020 20:00) (55 - 119)  BP: 115/59 (01 May 2020 20:00) (63/45 - 142/72)  BP(mean): 74 (01 May 2020 20:00) (49 - 89)  ABP: --  ABP(mean): --  RR: 18 (01 May 2020 20:00) (15 - 22)  SpO2: 99% (01 May 2020 20:00) (95% - 100%)      ABG - ( 30 Apr 2020 06:26 )  pH, Arterial: 7.48  pH, Blood: x     /  pCO2: 53    /  pO2: 62    / HCO3: 39    / Base Excess: 12.7  /  SaO2: 91                  I&O's Detail    30 Apr 2020 07:01  -  01 May 2020 07:00  --------------------------------------------------------  IN:    Free Water: 1200 mL    ns in tub fed  [id number]: 1200 mL    propofol Infusion: 512.2 mL  Total IN: 2912.2 mL    OUT:    Voided: 1715 mL  Total OUT: 1715 mL    Total NET: 1197.2 mL      01 May 2020 07:01  -  01 May 2020 21:05  --------------------------------------------------------  IN:    dexmedetomidine Infusion: 229 mL    Free Water: 600 mL    IV PiggyBack: 450 mL    norepinephrine Infusion: 65 mL    ns in tub fed  [id number]: 600 mL  Total IN: 1944 mL    OUT:    Voided: 525 mL  Total OUT: 525 mL    Total NET: 1419 mL            LABS:                        16.6   21.69 )-----------( 75       ( 01 May 2020 06:43 )             52.7     05-01    144  |  102  |  61<H>  ----------------------------<  150<H>  3.6   |  38<H>  |  1.00    Ca    8.0<L>      01 May 2020 06:43  Phos  3.1     05-01  Mg     2.9     05-01            CAPILLARY BLOOD GLUCOSE            CULTURES:  Culture Results:   Numerous Methicillin resistant Staphylococcus aureus  Normal Respiratory Faith present (04-29-20 @ 15:00)  Culture Results:   No growth to date. (04-29-20 @ 11:29)  Culture Results:   No growth to date. (04-29-20 @ 11:29)  Culture Results:   No Growth Final (04-26-20 @ 01:01)  Culture Results:   No Growth Final (04-26-20 @ 00:56)  Culture Results:   Few Pseudomonas aeruginosa (Carbapenem Resistant)  Moderate Methicillin resistant Staphylococcus aureus  Normal Respiratory Faith present (04-25-20 @ 01:00)        Physical Examination:    General: No acute distress.  sedated, intubated    HEENT: Pupils equal, reactive to light.  Symmetric.    PULM: diminished bilaterally, no significant sputum production    CVS: tachycardic, irregularly irregular rhythm, no murmurs, rubs, or gallops    ABD: Soft, nondistended, nontender, normoactive bowel sounds, no masses    EXT: No edema, nontender    SKIN: Warm and well perfused, no rashes noted.    NEURO: sedated, moves all extremities spontaneously      RADIOLOGY:     < from: Xray Chest 1 View- PORTABLE-Routine (05.01.20 @ 09:35) >  Findings:    Endotracheal tube and gastric tube are noted.    Lungs: There are bilateral lower lung lung opacities, decreased from previous study.  Heart: There is cardiomegaly.  Mediastinum: The mediastinum is within normal limits.    Impression:  1.  Decreasedlung opacities.      < from: CT Head No Cont (04.29.20 @ 12:17) >  Findings:  Brain: No acute intracranial hemorrhage or large vessel infarct is identified; the gray-white junction is intact. No large mass, mass effect, or midline shift is seen. The midline structures are unremarkable. The brain demonstrates unremarkable morphology and volume for age.    Ventricles: No hydrocephalus. Slightly greater fullness of the bodies of the lateral ventricles compared with prior  Extra-axial spaces: No subdural or epidural collection. The convexity sulci and basal cisterns are preserved.   Vascular, intracranial: Mild intracranial atherosclerotic changes are noted.    Surgical Changes: Partially visualized left-sided nasogastric tube in place.  Calvarium: The calvarium is intact without focal osseous pathology. No significant scalp contusion identified.   Skull base: The middle ears and mastoid air cells are clear. TMJs are aligned.     Orbits/facial bones: Non dedicated views of the orbits are grossly unremarkable. No visualized fracture.   Paranasal sinuses: Minimal marginal patchy mucosal thickening. The visualized paranasal sinuses are generally well aerated. Moderate rightward deviation of the bony nasal septum with a 5 millimeter right sided impinging nasal septal spur.    Impression:  Head CT without contrast  1.  No acute intracranial hemorrhage, extra-axial collection, hydrocephalus, midline shift or space-occupying mass lesion.  2.  No loss of gray-white junction or wedge-shaped infarct to suggest recent ischemic changes.  3.  Slightly greater fullness of the bodies of lateral ventricles compared with prior without evidence of hydrocephalus at this time.  4. No interval change in chronic and incidental findings.            CRITICAL CARE TIME SPENT: 32 mins  Time spent evaluating/treating patient with medical issues that pose a high risk for life threatening deterioration and/or end-organ damage, reviewing data/labs/imaging, discussing case with multidisciplinary team, discussing plan/goals of care with patient/family. Non-inclusive of procedure time.

## 2025-06-26 NOTE — ED ADULT NURSE NOTE - NSFALLRISKASMTTYPE_ED_ALL_ED
Bed in lowest position, wheels locked, appropriate side rails in place/Call bell, personal items and telephone in reach/Instruct patient to call for assistance before getting out of bed or chair/Non-slip footwear when patient is out of bed/[place] to call system/Physically safe environment - no spills, clutter or unnecessary equipment/Purposeful Proactive Rounding/Room/bathroom lighting operational, light cord in reach
Initial (On Arrival)

## 2025-06-29 NOTE — PROGRESS NOTE ADULT - ASSESSMENT
69 y/o man with obesity, VIJAY non-adherent to CPAP, HTN, HFpEF, permanent afib s/p Watchman, mitral regurgitation, peripheral vascular disease s/p L BKA, COPD, pulmonary HTN, chronic resp failure on home O2 4L/min, alcoholic cirrhosis, presented with difficulty breathing, productive cough, wheezing. In the ED, he was afebrile, BP WNL, HR 100s-110s, SPO2 97% on 4L. Exam notable for increased resp effort, B/L wheeze. Labs with negative troponin, , lactate 2.2. EKG afib RVR. CXR with mild pulm edema. RVP PCR panel (+) for parainfluenza infection. He was given aspirin, acetaminophen, Lasix IV, Solumedrol and Duoneb, placed on NIPPV. He subsequently improved to a modest degree and was admitted to Medicine thereafter.     #Acute on chronic hypoxic and hypercapneic respiratory failure  -Multifactorial due to parainfluenza infection, unable to rule out viral pneumonia, present upon admission, VIJAY non-adherent to NIPPV, COPD with exacerbation, pulmonary HTN, and possibly acute on chronic diastolic CHF.   - Continue to treat underlying issues  - O2 supplementation (on continuous home O2)  - NIPPV with BIPAP at night    #Parainfluenza infection, unable to rule out viral pneumonia, present upon admission  -Still with dyspnea and cough but gradually improving  - Continue supportive care measures    #VIJAY  -Stable  - Continue to encourage NIPPV use    #COPD with exacerbation  -Wheeze on exam. No resp distress.   - Continue Solumedrol, taper as able , budesonide neb q12, Duoneb q6  -azithro completed    #Acute on chronic diastolic CHF  -No angina. Troponin negative. ProBNP elevated to 1134. Initial EKG afib RVR which may have contributed to the CHF exacerbation.  - TTE done, report in paper chart  - Continue IV Lasix 40mg BID, spironolactone 25mg daily  -s/p dose metolazone 5/30  - Continue metoprolol   - f/u cards recs ([place])    #Permanent afib, RVR  -HR suboptimally controlled in setting of parainfluenza infection. On metoprolol and diltizem CD. HR now somewhat improved, 80s-90s.  - Continue metoprolol and diltiazem CD  - Not on AC as patient has hx of Watchman placement  -ASA    #Pulmonary nodules in MICHELLE and RLL  -As noted on CT, repeat CT 6 wks    #Alcoholic cirrhosis  No encephalopathy. No ascites. Unknown as to presence of varices.   - Continue diuretics  -w splenic hypodensity, obtain nonemergent MRI to further eval as outpt    #Normocytic anemia  -Did have an episode of rectal bleeding prior to admission, suspected to be hemorrhoidal. He does have hx of R ischemic colitis in past and underlying cirrhosis / alcohol use disorder that could also be responsible for his chronic anemia. -No overt bleeding at this time.  - Continue to monitor,   - Per Dr Barker, outpatient EGD and colonoscopy once respiratory status improving     #Hypomagnesemia  -Improved with supplementation    #Vit D deficiency  -25-OH vit D 17 ng/mL.    - Ordered for Vit D replacement    #DVT ppx- SQL    DVT px: LMWH  Code status: Full    Updated daughter, [phone], on 5/31.  Pt medically active, on iv diuresis, iv steroids. D/c >48hrs         oral temp 100.9